# Patient Record
Sex: FEMALE | Race: WHITE | NOT HISPANIC OR LATINO | Employment: OTHER | URBAN - NONMETROPOLITAN AREA
[De-identification: names, ages, dates, MRNs, and addresses within clinical notes are randomized per-mention and may not be internally consistent; named-entity substitution may affect disease eponyms.]

---

## 2017-01-04 ENCOUNTER — OFFICE VISIT (OUTPATIENT)
Dept: SURGERY | Facility: CLINIC | Age: 57
End: 2017-01-04

## 2017-01-04 ENCOUNTER — OFFICE VISIT (OUTPATIENT)
Dept: FAMILY MEDICINE CLINIC | Facility: CLINIC | Age: 57
End: 2017-01-04

## 2017-01-04 ENCOUNTER — OFFICE VISIT (OUTPATIENT)
Dept: ENDOCRINOLOGY | Facility: CLINIC | Age: 57
End: 2017-01-04

## 2017-01-04 VITALS
HEART RATE: 88 BPM | WEIGHT: 282.2 LBS | BODY MASS INDEX: 51.93 KG/M2 | SYSTOLIC BLOOD PRESSURE: 136 MMHG | DIASTOLIC BLOOD PRESSURE: 78 MMHG | HEIGHT: 62 IN

## 2017-01-04 VITALS
WEIGHT: 280.1 LBS | HEIGHT: 62 IN | OXYGEN SATURATION: 96 % | SYSTOLIC BLOOD PRESSURE: 138 MMHG | HEART RATE: 72 BPM | DIASTOLIC BLOOD PRESSURE: 82 MMHG | BODY MASS INDEX: 51.54 KG/M2

## 2017-01-04 VITALS
SYSTOLIC BLOOD PRESSURE: 128 MMHG | BODY MASS INDEX: 52.08 KG/M2 | HEIGHT: 62 IN | WEIGHT: 283 LBS | DIASTOLIC BLOOD PRESSURE: 68 MMHG

## 2017-01-04 DIAGNOSIS — E11.49 NEUROLOGIC DISORDER ASSOCIATED WITH DIABETES MELLITUS (HCC): ICD-10-CM

## 2017-01-04 DIAGNOSIS — E11.69 DIABETES MELLITUS TYPE 2 IN OBESE (HCC): ICD-10-CM

## 2017-01-04 DIAGNOSIS — E55.9 VITAMIN D DEFICIENCY: ICD-10-CM

## 2017-01-04 DIAGNOSIS — E66.9 DIABETES MELLITUS TYPE 2 IN OBESE (HCC): Primary | ICD-10-CM

## 2017-01-04 DIAGNOSIS — E78.5 DYSLIPIDEMIA: ICD-10-CM

## 2017-01-04 DIAGNOSIS — E66.9 DIABETES MELLITUS TYPE 2 IN OBESE (HCC): ICD-10-CM

## 2017-01-04 DIAGNOSIS — I10 ESSENTIAL HYPERTENSION: ICD-10-CM

## 2017-01-04 DIAGNOSIS — E11.69 DIABETES MELLITUS TYPE 2 IN OBESE (HCC): Primary | ICD-10-CM

## 2017-01-04 DIAGNOSIS — L02.211 ABDOMINAL WALL ABSCESS: Primary | ICD-10-CM

## 2017-01-04 PROCEDURE — 99213 OFFICE O/P EST LOW 20 MIN: CPT | Performed by: FAMILY MEDICINE

## 2017-01-04 PROCEDURE — 99024 POSTOP FOLLOW-UP VISIT: CPT | Performed by: SURGERY

## 2017-01-04 PROCEDURE — 99214 OFFICE O/P EST MOD 30 MIN: CPT | Performed by: NURSE PRACTITIONER

## 2017-01-04 RX ORDER — OMEPRAZOLE AND SODIUM BICARBONATE 40; 1100 MG/1; MG/1
CAPSULE ORAL
Refills: 3 | Status: ON HOLD | COMMUNITY
Start: 2016-12-24 | End: 2017-03-21 | Stop reason: SDUPTHER

## 2017-01-04 RX ORDER — CHLORHEXIDINE GLUCONATE 4 G/100ML
SOLUTION TOPICAL DAILY
Qty: 118 ML | Refills: 0 | Status: SHIPPED | OUTPATIENT
Start: 2017-01-04 | End: 2017-03-21 | Stop reason: HOSPADM

## 2017-01-04 RX ORDER — FLUCONAZOLE 150 MG/1
TABLET ORAL
COMMUNITY
Start: 2016-12-14 | End: 2017-01-27

## 2017-01-04 NOTE — LETTER
January 4, 2017     Yadira Delarosa MD  [facility] [address] [place] [address]    Patient: Yamileth Slater   YOB: 1960   Date of Visit: 1/4/2017       Dear Dr. Isaura MD:    Thank you for referring Yamileth Slater to me for evaluation. Below are the relevant portions of my assessment and plan of care.                   If you have questions, please do not hesitate to call me. I look forward to following Yamileth along with you.         Sincerely,        Lucien Dow MD        CC: No Recipients

## 2017-01-04 NOTE — PROGRESS NOTES
Subjective    Yamileth Slater is a 56 y.o. female. she is here today for follow-up.    History of Present Illness     History of Present Illness  History of Present Illness  Duration/Timing:  Diabetes mellitus type 2, Age at onset of diabetes: 33 years, Onset of symptoms gradual  Timing - constant  Quality - uncontrolled  Severity - moderate    Severity (Complications/Hospitalizations)  Secondary Macrovascular Complications:  CAD, PAD  PTCA stent in May 2014    left carotid stenst    left fem pop    CABG x 3, 2014    Context  Diabetes Regimen:  Insulin  Blood Glucose Readings  [id number]  Diet  trying to eat 60 g cho per meal   Exercise:  Does not exercise    Associated Signs/Symptoms  Hyperglycemic Symptoms:  No polyuria, No polydipsia, No polyphagia  Hypoglycemic Episodes:  No documented hypoglycemia  Modifying Factors/Comorbidities  Alleviating - family support, compliance      The following portions of the patient's history were reviewed and updated as appropriate:   Past Medical History   Diagnosis Date   • Anxiety states    • Asthma    • Candidiasis of mouth    • Candidiasis of vulva and vagina    • Carotid artery stenosis      DWIGHT 0-15%, LICA 0-15%. LICA stent 5/2014.   • Chest pain    • Chronic folliculitis    • Chronic obstructive lung disease    • Coronary arteriosclerosis    • Diabetes mellitus      no retinopathy; A1C 9.1      • Dyslipidemia    • Dysphagia    • Encounter for general adult medical examination with abnormal findings    • Essential hypertension    • Excoriated eczema      asteosis/keratosis   • Furuncle of neck    • GERD (gastroesophageal reflux disease)    • History of colon polyps    • Hypertensive disorder    • Infection of skin and subcutaneous tissue      rt perineal abscess   • Infestation by Sarcoptes scabiei deepak hominis    • Kidney stone    • Leukocytosis    • Lower limb anomaly      Abscess of lower limb - ANTX causing n/v      • Mixed hyperlipidemia    • Morbid obesity due to  excess calories      BMI 44.5   • Need for vaccination    • Neurologic disorder associated with diabetes mellitus    • Non-healing surgical wound      LLE EVHa   • Pain      LLE   • Peripheral vascular disease    • Rash    • Shoulder joint pain    • Sleep apnea    • Surgical follow-up care      5/27/14 L carotid stent   • Tobacco dependence syndrome      1/2ppd      • Type 2 diabetes mellitus    • Viral wart      RIGHT middle phalanx   • Vitamin D deficiency      Past Surgical History   Procedure Laterality Date   • Coronary artery bypass graft  11/15/2013     CABG x 3 with LIMA to LAD and coronary endarterectomy to LAD, SVG to Ramus intermedius branch and SVG to PDA.    • Cardiac catheterization  08/09/2013     Severe multivessel CAD with critical lesions noted in the RCA, obtuse marginal two, ramus intermemdious, and LAD as described above. Normal LV systolic function with no wall motion abnormality.    • Cardiac catheterization  05/27/2014      LEFT Carotid Stent    • Colonoscopy  05/02/2016     Normal colon.Diverticulosis in the sigmoid colon.No specimens collected.    • Other surgical history  01/25/2016     DESTRUCTION OF BENIGN LESION      • Endoscopy  09/23/2015      Esophageal stricture present.Normal stomach.Normal duodenum.    • Endoscopy  11/16/2015     w/ dilatation - Esophageal stricture present,Dilatation performed.Normal stomach and duodenum.    • Other surgical history  04/18/2014     ear/neck - L attempted CEA, Neck Dissection    • Incision and drainage abscess  01/02/2016     Incision and drainage of right perineal abscess.    • Incision and drainage abscess  02/18/2014     Incision and Drainage of abscess of left lower leg      Family History   Problem Relation Age of Onset   • Coronary artery disease Other    • Diabetes Other    • Heart disease Other    • Hypertension Other      OB History     No data available        Current Outpatient Prescriptions   Medication Sig Dispense Refill   •  acetaminophen (TYLENOL) 325 MG tablet Take 650 mg by mouth every 4 (four) hours as needed for mild pain (1-3).     • atorvastatin (LIPITOR) 20 MG tablet Take 1 tablet by mouth Daily. 30 tablet 3   • Blood Glucose Monitoring Suppl ([employer] BLOOD GLUCOSE METER) W/DEVICE kit 1 each 3 (Three) Times a Day. 1 kit 3   • budesonide-formoterol (SYMBICORT) 160-4.5 MCG/ACT inhaler Inhale 2 puffs 2 (Two) Times a Day. 1 inhaler 6   • cetirizine (zyrTEC) 10 MG tablet Take 1 tablet by mouth Daily. 30 tablet 3   • chlorhexidine (HIBICLENS) 4 % external liquid Apply  topically Daily. 118 mL 0   • clindamycin (CLEOCIN) 150 MG capsule Take 1 capsule by mouth 4 (Four) Times a Day.  0   • clobetasol (CLOBEX) 0.05 % lotion Apply  topically. apply to skin bid x 1-2w then prn. generic     • clopidogrel (PLAVIX) 75 MG tablet Take 1 tablet by mouth Daily. 30 tablet 3   • cyclobenzaprine (FLEXERIL) 10 MG tablet TAKE 1 TABLET BY MOUTH 2 (TWO) TIMES A DAY AS NEEDED FOR MUSCLE SPASMS. 60 tablet 0   • fluconazole (DIFLUCAN) 100 MG tablet Take 1 tablet by mouth Daily. 14 tablet 0   • fluconazole (DIFLUCAN) 150 MG tablet      • furosemide (LASIX) 40 MG tablet TAKE 1 TABLET BY MOUTH DAILY. 30 tablet 0   • gabapentin (NEURONTIN) 800 MG tablet TAKE 1 TABLET BY MOUTH 3 (THREE) TIMES A DAY. 90 tablet 0   • glucose blood test strip 1 each by Other route 4 (Four) Times a Day. Use as instructed 100 each 12   • HYDROcodone-acetaminophen (NORCO) 5-325 MG per tablet Take 1 tablet by mouth Every 6 (Six) Hours As Needed for moderate pain (4-6). 20 tablet 0   • Insulin Glargine (LANTUS SOLOSTAR) 100 UNIT/ML injection pen Inject 160 Units under the skin Every Night. 75 mL 4   • insulin glargine (LANTUS) 100 UNIT/ML injection Inject 90 Units under the skin Every 12 (Twelve) Hours.     • Insulin Lispro (HUMALOG KWIKPEN) 100 UNIT/ML solution pen-injector Inject 45 Units under the skin 3 (Three) Times a Day Before Meals. 60 mL 4   • Insulin Pen Needle (NOVOFINE) 30G X 8  MM misc 4 (four) times a day.     • lisinopril (PRINIVIL,ZESTRIL) 10 MG tablet Take 1 tablet by mouth Daily. 30 tablet 3   • metFORMIN (GLUCOPHAGE) 1000 MG tablet Take 1 tablet by mouth 2 (Two) Times a Day With Meals. 60 tablet 3   • metoprolol tartrate (LOPRESSOR) 50 MG tablet Take 1 tablet by mouth 2 (Two) Times a Day. 60 tablet 3   • naproxen (NAPROSYN) 500 MG tablet Take 1 tablet by mouth 2 (Two) Times a Day With Meals. 60 tablet 1   • nitroglycerin (NITROSTAT) 0.4 MG SL tablet Place 1 tablet under the tongue As Needed for chest pain. Take no more than 3 doses in 15 minutes. 30 tablet 3   • omeprazole-sodium bicarbonate (ZEGERID)  MG per capsule   3   • pantoprazole (PROTONIX) 40 MG EC tablet Take 1 tablet by mouth Daily. 30 tablet 3   • permethrin (ELIMITE) 5 % cream Apply  topically. Massage into skin from head to feet, remove 8-14 hours later with water     • potassium chloride (K-DUR) 10 MEQ CR tablet Take 1 tablet by mouth Every Night. 30 tablet 3   • promethazine (PHENERGAN) 25 MG tablet Take 25 mg by mouth every 6 (six) hours as needed for nausea or vomiting.       No current facility-administered medications for this visit.      Allergies   Allergen Reactions   • Other      Bandaids, MRSA, SURECLOSE     Social History     Social History   • Marital status:      Spouse name: N/A   • Number of children: N/A   • Years of education: N/A     Social History Main Topics   • Smoking status: Current Every Day Smoker     Types: Cigarettes   • Smokeless tobacco: None   • Alcohol use No   • Drug use: No   • Sexual activity: Not Asked     Other Topics Concern   • None     Social History Narrative       Review of Systems  Review of Systems   Constitutional: Negative for activity change, appetite change, diaphoresis and fatigue.   HENT: Negative for congestion, dental problem, drooling, ear discharge, ear pain, facial swelling, sneezing, sore throat, tinnitus, trouble swallowing and voice change.    Eyes:  "Negative for photophobia, pain, discharge, redness, itching and visual disturbance.   Respiratory: Negative for apnea, cough, choking, chest tightness and shortness of breath.    Cardiovascular: Negative for chest pain, palpitations and leg swelling.   Gastrointestinal: Negative for abdominal distention, abdominal pain, constipation, diarrhea, nausea and vomiting.   Endocrine: Negative for cold intolerance, heat intolerance, polydipsia, polyphagia and polyuria.   Genitourinary: Negative for difficulty urinating, dysuria, frequency, hematuria and urgency.   Musculoskeletal: Negative for arthralgias, back pain, gait problem, joint swelling, myalgias, neck pain and neck stiffness.   Skin: Negative for color change, pallor, rash and wound.   Allergic/Immunologic: Negative for environmental allergies, food allergies and immunocompromised state.   Neurological: Negative for dizziness, tremors, facial asymmetry, weakness, light-headedness, numbness and headaches.   Hematological: Negative for adenopathy. Does not bruise/bleed easily.   Psychiatric/Behavioral: Negative for agitation, behavioral problems, confusion, decreased concentration, dysphoric mood and sleep disturbance.        Objective      Visit Vitals   • /78 (BP Location: Right arm, Patient Position: Sitting, Cuff Size: Adult)   • Pulse 88   • Ht 62\" (157.5 cm)   • Wt 282 lb 3.2 oz (128 kg)   • BMI 51.62 kg/m2     Physical Exam   Constitutional: She is oriented to person, place, and time. She appears well-developed and well-nourished. No distress.   HENT:   Head: Normocephalic and atraumatic.   Right Ear: External ear normal.   Left Ear: External ear normal.   Nose: Nose normal.   Eyes: Conjunctivae and EOM are normal. Pupils are equal, round, and reactive to light.   Neck: Normal range of motion. Neck supple. No tracheal deviation present. No thyromegaly present.   Cardiovascular: Normal rate, regular rhythm and normal heart sounds.    No murmur " heard.  Pulmonary/Chest: Effort normal and breath sounds normal. No respiratory distress. She has no wheezes.   Abdominal: Soft. Bowel sounds are normal. There is no tenderness. There is no rebound and no guarding.   Musculoskeletal: Normal range of motion. She exhibits no edema, tenderness or deformity.   Neurological: She is alert and oriented to person, place, and time. No cranial nerve deficit.   Skin: Skin is warm and dry. No rash noted.   Psychiatric: She has a normal mood and affect. Her behavior is normal. Judgment and thought content normal.       Lab Review  GLUCOSE (mg/dl)   Date Value   12/14/2016 101 (H)   12/13/2016 210 (H)   12/12/2016 415 (H)     SODIUM (mmol/L)   Date Value   12/14/2016 141   12/13/2016 140   12/12/2016 136 (L)     POTASSIUM (mmol/L)   Date Value   12/14/2016 4.1   12/13/2016 4.3   12/12/2016 4.1     CHLORIDE (mmol/L)   Date Value   12/14/2016 102   12/13/2016 102   12/12/2016 97     CO2 (mmol/L)   Date Value   12/14/2016 28   12/13/2016 25   12/12/2016 24     BUN (mg/dl)   Date Value   12/14/2016 16   12/13/2016 20   12/12/2016 36 (H)     CREATININE (mg/dl)   Date Value   12/14/2016 0.9   12/13/2016 0.9   12/12/2016 1.3 (H)     HEMOGLOBIN A1C (%TotHgb)   Date Value   12/12/2016 11.8 (H)   11/17/2016 10.9 (H)   08/09/2016 9.1 (H)     TRIGLYCERIDES (mg/dl)   Date Value   11/09/2016 >525 (H)   08/10/2016 310 (H)   03/27/2015 295 (H)       Assessment/Plan      1. Diabetes mellitus type 2 in obese    2. Vitamin D deficiency    3. Neurologic disorder associated with diabetes mellitus    4. Dyslipidemia    5. Essential hypertension    .    Medications prescribed:  Outpatient Encounter Prescriptions as of 1/4/2017   Medication Sig Dispense Refill   • acetaminophen (TYLENOL) 325 MG tablet Take 650 mg by mouth every 4 (four) hours as needed for mild pain (1-3).     • atorvastatin (LIPITOR) 20 MG tablet Take 1 tablet by mouth Daily. 30 tablet 3   • Blood Glucose Monitoring Suppl ([employer] BLOOD  GLUCOSE METER) W/DEVICE kit 1 each 3 (Three) Times a Day. 1 kit 3   • budesonide-formoterol (SYMBICORT) 160-4.5 MCG/ACT inhaler Inhale 2 puffs 2 (Two) Times a Day. 1 inhaler 6   • cetirizine (zyrTEC) 10 MG tablet Take 1 tablet by mouth Daily. 30 tablet 3   • chlorhexidine (HIBICLENS) 4 % external liquid Apply  topically Daily. 118 mL 0   • clindamycin (CLEOCIN) 150 MG capsule Take 1 capsule by mouth 4 (Four) Times a Day.  0   • clobetasol (CLOBEX) 0.05 % lotion Apply  topically. apply to skin bid x 1-2w then prn. generic     • clopidogrel (PLAVIX) 75 MG tablet Take 1 tablet by mouth Daily. 30 tablet 3   • cyclobenzaprine (FLEXERIL) 10 MG tablet TAKE 1 TABLET BY MOUTH 2 (TWO) TIMES A DAY AS NEEDED FOR MUSCLE SPASMS. 60 tablet 0   • fluconazole (DIFLUCAN) 100 MG tablet Take 1 tablet by mouth Daily. 14 tablet 0   • fluconazole (DIFLUCAN) 150 MG tablet      • furosemide (LASIX) 40 MG tablet TAKE 1 TABLET BY MOUTH DAILY. 30 tablet 0   • gabapentin (NEURONTIN) 800 MG tablet TAKE 1 TABLET BY MOUTH 3 (THREE) TIMES A DAY. 90 tablet 0   • glucose blood test strip 1 each by Other route 4 (Four) Times a Day. Use as instructed 100 each 12   • HYDROcodone-acetaminophen (NORCO) 5-325 MG per tablet Take 1 tablet by mouth Every 6 (Six) Hours As Needed for moderate pain (4-6). 20 tablet 0   • Insulin Glargine (LANTUS SOLOSTAR) 100 UNIT/ML injection pen Inject 160 Units under the skin Every Night. 75 mL 4   • insulin glargine (LANTUS) 100 UNIT/ML injection Inject 90 Units under the skin Every 12 (Twelve) Hours.     • Insulin Lispro (HUMALOG KWIKPEN) 100 UNIT/ML solution pen-injector Inject 45 Units under the skin 3 (Three) Times a Day Before Meals. 60 mL 4   • Insulin Pen Needle (NOVOFINE) 30G X 8 MM misc 4 (four) times a day.     • lisinopril (PRINIVIL,ZESTRIL) 10 MG tablet Take 1 tablet by mouth Daily. 30 tablet 3   • metFORMIN (GLUCOPHAGE) 1000 MG tablet Take 1 tablet by mouth 2 (Two) Times a Day With Meals. 60 tablet 3   •  metoprolol tartrate (LOPRESSOR) 50 MG tablet Take 1 tablet by mouth 2 (Two) Times a Day. 60 tablet 3   • naproxen (NAPROSYN) 500 MG tablet Take 1 tablet by mouth 2 (Two) Times a Day With Meals. 60 tablet 1   • nitroglycerin (NITROSTAT) 0.4 MG SL tablet Place 1 tablet under the tongue As Needed for chest pain. Take no more than 3 doses in 15 minutes. 30 tablet 3   • omeprazole-sodium bicarbonate (ZEGERID)  MG per capsule   3   • pantoprazole (PROTONIX) 40 MG EC tablet Take 1 tablet by mouth Daily. 30 tablet 3   • permethrin (ELIMITE) 5 % cream Apply  topically. Massage into skin from head to feet, remove 8-14 hours later with water     • potassium chloride (K-DUR) 10 MEQ CR tablet Take 1 tablet by mouth Every Night. 30 tablet 3   • promethazine (PHENERGAN) 25 MG tablet Take 25 mg by mouth every 6 (six) hours as needed for nausea or vomiting.       No facility-administered encounter medications on file as of 1/4/2017.        Orders placed during this encounter include:  No orders of the defined types were placed in this encounter.      Glycemic Management  Lantus 100 bid --- taking 90 units pm and 90 units in am if blood sugar is high --decrease to 85 units     Novolog 45 w meals     change to 30 for small meals    then 60 to 75 for bigger meals   Lipid Management  on lipitor 20 mg qhs    ldl 60s   Blood Pressure Management  controlled on lisinopril   Microvascular Complication Monitoring:  Neuropathy type sensorial  needs new eye exam       Weight Related:  Obesity, Recommended weight loss, Counseled on nutrition  Other Diabetes Related Aspects  nl TSH in 3-15        4. Follow-up: Return in about 4 weeks (around 2/1/2017) for Recheck.    .

## 2017-01-04 NOTE — PROGRESS NOTES
"  Subjective:     Yamileth Slater is a 56 y.o. female who presents for follow up for recurrent abdominal wall/pannicular cellulitis with abscess and uncontrolled DM2 with hyperglycemia.  Pt was discharged on 12/14/16 with home health nursing for dressing changes/wound care. Pt missed initial follow up appt with Dr. Dow but is scheduled to see him this afternoon.  She has follow up appointment Laz Patricia (1/4/17) for DM2 medication management.  Pt reports incision site healing and requiring less packing.  Pt is taking care to keep the area clean & dry.      Diabetes HPI:  The patient was initially diagnosed with Type 2 diabetes mellitus based on the following criteria:  Elevated A1C and random blood glucose.    Known diabetic complications: peripheral neuropathy and cardiovascular disease  Cardiovascular risk factors: diabetes mellitus, dyslipidemia, hypertension, obesity (BMI >= 30 kg/m2), sedentary lifestyle and smoking/ tobacco exposure  Current diabetic medications include Metformin 1000 mg BID; Lantus 90u qhs & 90u qAM; Humalog 45-60u sliding scale with meals.     Eye exam current (within one year): no   Weight trend: increasing steadily  Prior visit with dietician: yes - in hospital  Current diet: in general, an \"unhealthy\" diet, but has been avoiding [email or web address]h as recommended  Current exercise: none    Current monitoring regimen: home blood tests - daily  Home blood sugar records: checks irregularly, sometimes fasting, sometimes during the day.  Values: less than 120  Any episodes of hypoglycemia? no     ACE inhibitor or angiotensin II receptor blocker?     Yes     lisinopril (generic)        Statin?     Yes    ASA?     Yes     HEMOGLOBIN A1C (%TotHgb)   Date Value   12/12/2016 11.8 (H)   11/17/2016 10.9 (H)   08/09/2016 9.1 (H)       ASSOCIATED SYMPTOMS:     FatigueNo   MalaiseNo    DiaphoresisNo    Weight lossNo    Weight gainYes    Visual impairmentNo   Chest painNo    PalpitationsNo "    TachycardiaNo   ClaudicationNo    PolyphagiaYes    PolydipsiaYes    PolyuriaYes    NumbnessYes    Foot painNo    Skin lesionsNo    Memory lossNo     COMORBID CONDITIONS:     ObesityYes    HypothyroidismNo    HyperlipidemiaYes    CADYes    Cerebrovascular diseaseNo   PVDNo    HTNYes    Sexual dysfunctionNo    DepressionNo      The following portions of the patient's history were reviewed and updated as appropriate: allergies, current medications, past family history, past medical history, past social history, past surgical history and problem list.    Preventative:  Over the past 2 weeks, have you felt down, depressed, or hopeless?No   Over the past 2 weeks, have you felt little interest or pleasure in doing things?No  Clinical depression screening refused by patient.No     On osteoporosis therapy?No     Past Medical Hx:  Past Medical History   Diagnosis Date   • Anxiety states    • Asthma    • Candidiasis of mouth    • Candidiasis of vulva and vagina    • Carotid artery stenosis      DWIGHT 0-15%, LICA 0-15%. LICA stent 5/2014.   • Chest pain    • Chronic folliculitis    • Chronic obstructive lung disease    • Coronary arteriosclerosis    • Diabetes mellitus      no retinopathy; A1C 9.1      • Dyslipidemia    • Dysphagia    • Encounter for general adult medical examination with abnormal findings    • Essential hypertension    • Excoriated eczema      asteosis/keratosis   • Furuncle of neck    • GERD (gastroesophageal reflux disease)    • History of colon polyps    • Hypertensive disorder    • Infection of skin and subcutaneous tissue      rt perineal abscess   • Infestation by Sarcoptes scabiei deepak hominis    • Kidney stone    • Leukocytosis    • Lower limb anomaly      Abscess of lower limb - ANTX causing n/v      • Mixed hyperlipidemia    • Morbid obesity due to excess calories      BMI 44.5   • Need for vaccination    • Neurologic disorder associated with diabetes mellitus    • Non-healing surgical wound       LLE EVHa   • Pain      LLE   • Peripheral vascular disease    • Rash    • Shoulder joint pain    • Sleep apnea    • Surgical follow-up care      5/27/14 L carotid stent   • Tobacco dependence syndrome      1/2ppd      • Type 2 diabetes mellitus    • Viral wart      RIGHT middle phalanx   • Vitamin D deficiency        Past Surgical Hx:  Past Surgical History   Procedure Laterality Date   • Coronary artery bypass graft  11/15/2013     CABG x 3 with LIMA to LAD and coronary endarterectomy to LAD, SVG to Ramus intermedius branch and SVG to PDA.    • Cardiac catheterization  08/09/2013     Severe multivessel CAD with critical lesions noted in the RCA, obtuse marginal two, ramus intermemdious, and LAD as described above. Normal LV systolic function with no wall motion abnormality.    • Cardiac catheterization  05/27/2014      LEFT Carotid Stent    • Colonoscopy  05/02/2016     Normal colon.Diverticulosis in the sigmoid colon.No specimens collected.    • Other surgical history  01/25/2016     DESTRUCTION OF BENIGN LESION      • Endoscopy  09/23/2015      Esophageal stricture present.Normal stomach.Normal duodenum.    • Endoscopy  11/16/2015     w/ dilatation - Esophageal stricture present,Dilatation performed.Normal stomach and duodenum.    • Other surgical history  04/18/2014     ear/neck - L attempted CEA, Neck Dissection    • Incision and drainage abscess  01/02/2016     Incision and drainage of right perineal abscess.    • Incision and drainage abscess  02/18/2014     Incision and Drainage of abscess of left lower leg        Health Maintenance:  Immunization History   Administered Date(s) Administered   • Hepatitis B 06/11/2015, 10/20/2015, 05/13/2016   • Influenza Split High Dose Preservative Free IM 11/10/2014   • Pneumococcal Polysaccharide 01/01/2012   • Tdap 11/10/2014     Health Maintenance   Topic Date Due   • MAMMOGRAM  08/24/2016   • PAP SMEAR  08/24/2016   • DIABETIC EYE EXAM  08/24/2016   • MEDICARE ANNUAL  WELLNESS  08/24/2016   • HEMOGLOBIN A1C  06/12/2017   • URINE MICROALBUMIN  10/03/2017   • DIABETIC FOOT EXAM  11/09/2017   • LIPID PANEL  11/09/2017   • TDAP/TD VACCINES (2 - Td) 11/10/2024   • COLONOSCOPY  05/02/2026   • PNEUMOCOCCAL VACCINE (19-64 MEDIUM RISK)  Completed   • HEPATITIS C SCREENING  Completed   • INFLUENZA VACCINE  Addressed       Current Meds:    Current Outpatient Prescriptions:   •  atorvastatin (LIPITOR) 20 MG tablet, Take 1 tablet by mouth Daily., Disp: 30 tablet, Rfl: 3  •  Blood Glucose Monitoring Suppl ([employer] BLOOD GLUCOSE METER) W/DEVICE kit, 1 each 3 (Three) Times a Day., Disp: 1 kit, Rfl: 3  •  budesonide-formoterol (SYMBICORT) 160-4.5 MCG/ACT inhaler, Inhale 2 puffs 2 (Two) Times a Day., Disp: 1 inhaler, Rfl: 6  •  cetirizine (zyrTEC) 10 MG tablet, Take 1 tablet by mouth Daily., Disp: 30 tablet, Rfl: 3  •  clobetasol (CLOBEX) 0.05 % lotion, Apply  topically. apply to skin bid x 1-2w then prn. generic, Disp: , Rfl:   •  clopidogrel (PLAVIX) 75 MG tablet, Take 1 tablet by mouth Daily., Disp: 30 tablet, Rfl: 3  •  cyclobenzaprine (FLEXERIL) 10 MG tablet, TAKE 1 TABLET BY MOUTH 2 (TWO) TIMES A DAY AS NEEDED FOR MUSCLE SPASMS., Disp: 60 tablet, Rfl: 0  •  fluconazole (DIFLUCAN) 150 MG tablet, , Disp: , Rfl:   •  furosemide (LASIX) 40 MG tablet, TAKE 1 TABLET BY MOUTH DAILY., Disp: 30 tablet, Rfl: 0  •  gabapentin (NEURONTIN) 800 MG tablet, TAKE 1 TABLET BY MOUTH 3 (THREE) TIMES A DAY., Disp: 90 tablet, Rfl: 0  •  glucose blood test strip, 1 each by Other route 4 (Four) Times a Day. Use as instructed, Disp: 100 each, Rfl: 12  •  Insulin Glargine (LANTUS SOLOSTAR) 100 UNIT/ML injection pen, Inject 160 Units under the skin Every Night., Disp: 75 mL, Rfl: 4  •  insulin glargine (LANTUS) 100 UNIT/ML injection, Inject 90 Units under the skin Every 12 (Twelve) Hours., Disp: , Rfl:   •  Insulin Lispro (HUMALOG KWIKPEN) 100 UNIT/ML solution pen-injector, Inject 45 Units under the skin 3 (Three) Times a  Day Before Meals., Disp: 60 mL, Rfl: 4  •  Insulin Pen Needle (NOVOFINE) 30G X 8 MM misc, 4 (four) times a day., Disp: , Rfl:   •  lisinopril (PRINIVIL,ZESTRIL) 10 MG tablet, Take 1 tablet by mouth Daily., Disp: 30 tablet, Rfl: 3  •  metFORMIN (GLUCOPHAGE) 1000 MG tablet, Take 1 tablet by mouth 2 (Two) Times a Day With Meals., Disp: 60 tablet, Rfl: 3  •  metoprolol tartrate (LOPRESSOR) 50 MG tablet, Take 1 tablet by mouth 2 (Two) Times a Day., Disp: 60 tablet, Rfl: 3  •  naproxen (NAPROSYN) 500 MG tablet, Take 1 tablet by mouth 2 (Two) Times a Day With Meals., Disp: 60 tablet, Rfl: 1  •  nitroglycerin (NITROSTAT) 0.4 MG SL tablet, Place 1 tablet under the tongue As Needed for chest pain. Take no more than 3 doses in 15 minutes., Disp: 30 tablet, Rfl: 3  •  omeprazole-sodium bicarbonate (ZEGERID)  MG per capsule, , Disp: , Rfl: 3  •  pantoprazole (PROTONIX) 40 MG EC tablet, Take 1 tablet by mouth Daily., Disp: 30 tablet, Rfl: 3  •  permethrin (ELIMITE) 5 % cream, Apply  topically. Massage into skin from head to feet, remove 8-14 hours later with water, Disp: , Rfl:   •  potassium chloride (K-DUR) 10 MEQ CR tablet, Take 1 tablet by mouth Every Night., Disp: 30 tablet, Rfl: 3  •  promethazine (PHENERGAN) 25 MG tablet, Take 25 mg by mouth every 6 (six) hours as needed for nausea or vomiting., Disp: , Rfl:   •  acetaminophen (TYLENOL) 325 MG tablet, Take 650 mg by mouth every 4 (four) hours as needed for mild pain (1-3)., Disp: , Rfl:   •  clindamycin (CLEOCIN) 150 MG capsule, Take 1 capsule by mouth 4 (Four) Times a Day., Disp: , Rfl: 0  •  fluconazole (DIFLUCAN) 100 MG tablet, Take 1 tablet by mouth Daily., Disp: 14 tablet, Rfl: 0  •  HYDROcodone-acetaminophen (NORCO) 5-325 MG per tablet, Take 1 tablet by mouth Every 6 (Six) Hours As Needed for moderate pain (4-6)., Disp: 20 tablet, Rfl: 0    Allergies:  Other    Family Hx:  Family History   Problem Relation Age of Onset   • Coronary artery disease Other    •  "Diabetes Other    • Heart disease Other    • Hypertension Other         Social History:  Social History     Social History   • Marital status:      Spouse name: N/A   • Number of children: N/A   • Years of education: N/A     Occupational History   • Not on file.     Social History Main Topics   • Smoking status: Current Every Day Smoker     Types: Cigarettes   • Smokeless tobacco: Not on file   • Alcohol use No   • Drug use: No   • Sexual activity: Not on file     Other Topics Concern   • Not on file     Social History Narrative       Review of Systems  Review of Systems   Constitutional: Negative for activity change, appetite change, chills, diaphoresis, fatigue and unexpected weight change.   HENT: Negative for congestion, facial swelling, postnasal drip, rhinorrhea, sinus pressure, sneezing and sore throat.    Eyes: Negative for photophobia and visual disturbance.   Respiratory: Negative for cough, chest tightness, shortness of breath and wheezing.    Cardiovascular: Negative for chest pain, palpitations and leg swelling.   Gastrointestinal: Negative for abdominal pain, constipation, diarrhea, nausea and vomiting.   Endocrine: Positive for polydipsia, polyphagia and polyuria.   Genitourinary: Negative for difficulty urinating, dysuria and urgency.   Musculoskeletal: Negative for arthralgias, gait problem, joint swelling and neck stiffness.   Skin: Positive for wound (RLQ (pannicular) incision). Negative for color change, pallor and rash.   Neurological: Positive for numbness. Negative for seizures, syncope, speech difficulty, weakness, light-headedness and headaches.   Psychiatric/Behavioral: Negative for decreased concentration, dysphoric mood, sleep disturbance and suicidal ideas. The patient is not nervous/anxious.        Objective:     Visit Vitals   • /82 (BP Location: Left arm, Patient Position: Sitting)   • Pulse 72   • Ht 62\" (157.5 cm)   • Wt 280 lb 1.6 oz (127 kg)   • SpO2 96%   • BMI 51.23 " kg/m2       Physical Exam   Constitutional: No distress.   Obese   HENT:   Head: Normocephalic and atraumatic.   Nose: Nose normal.   Mouth/Throat: Oropharynx is clear and moist. No oropharyngeal exudate.   Eyes: Conjunctivae and EOM are normal. Pupils are equal, round, and reactive to light. No scleral icterus.   Neck: Normal range of motion. Neck supple. No tracheal deviation present.   Cardiovascular: Normal rate, regular rhythm, normal heart sounds and intact distal pulses.    Pulmonary/Chest: Effort normal and breath sounds normal. No respiratory distress.   Abdominal: Soft. Bowel sounds are normal. She exhibits no distension. There is no tenderness.       Musculoskeletal: Normal range of motion.   Neurological: She is alert. Gait normal.   Skin: Skin is warm and dry. She is not diaphoretic.   Psychiatric: Mood, memory, affect and judgment normal.     Diabetic foot exam:   Left: Filament test present              Grant test not examined   Pulses wnl   Reflexes present   Vibratory sensation diminished   Proprioception normal   Sharp/dull discrimination diminished       Right: Filament test present              Grant test not examined   Pulses present   Reflexes wnl   Vibratory sensation diminished   Proprioception normal   Sharp/dull discrimination diminished     Feet - warm, good capillary refill       Lab Review  GLUCOSE (mg/dl)   Date Value   12/14/2016 101 (H)   12/13/2016 210 (H)   12/12/2016 415 (H)     SODIUM (mmol/L)   Date Value   12/14/2016 141   12/13/2016 140   12/12/2016 136 (L)     POTASSIUM (mmol/L)   Date Value   12/14/2016 4.1   12/13/2016 4.3   12/12/2016 4.1     CHLORIDE (mmol/L)   Date Value   12/14/2016 102   12/13/2016 102   12/12/2016 97     CO2 (mmol/L)   Date Value   12/14/2016 28   12/13/2016 25   12/12/2016 24     BUN (mg/dl)   Date Value   12/14/2016 16   12/13/2016 20   12/12/2016 36 (H)     CREATININE (mg/dl)   Date Value   12/14/2016 0.9   12/13/2016 0.9   12/12/2016 1.3 (H)      HEMOGLOBIN A1C (%TotHgb)   Date Value   12/12/2016 11.8 (H)   11/17/2016 10.9 (H)   08/09/2016 9.1 (H)     TRIGLYCERIDES (mg/dl)   Date Value   11/09/2016 >525 (H)   08/10/2016 310 (H)   03/27/2015 295 (H)       none     Assessment:     Yamileth was seen today for diabetes and wound check.    Diagnoses and all orders for this visit:    Abdominal wall abscess    Diabetes mellitus type 2 in obese    Other orders  -     chlorhexidine (HIBICLENS) 4 % external liquid; Apply  topically Daily.        Plan:     1.  Rx changes: none  2.  Education: Reviewed ‘ABCs’ of diabetes management (respective goals in parentheses):  A1C (<7), preprandial glucose (70 mg/dl - 130 mg/dl), postprandial glucose (< 180 mg/dl), blood pressure (<130/80), and cholesterol (LDL <100 mg/dl; HDL > 50 mg/dl; Trig < 150 mg/dl).  3.  Issues reviewed with Yamileth Slater: low cholesterol diet, weight control and daily exercise discussed, home glucose monitoring emphasized, foot care discussed and Podiatry visits discussed, annual eye examinations at Ophthalmology discussed, long term diabetic complications discussed, patient urged in the strongest terms to quit smoking and follow ADA diet.  4.  Compliance at present is estimated to be fair. Efforts to improve compliance (if necessary) will be directed at dietary modifications: ADA diet, increased exercise and regular blood sugar monitoring: 3 times daily.    Return in about 1 month (around 2/4/2017). - initial medicare wellness visit    GOALS:  Control of Blood Glucose    BARRIERS TO GOALS:  Compliance    Preventative:  Female Preventative: Mammogram is due, PAP is due - will schedule, Colon cancer screening is up to date   Recommended:Influenza - pt declined  Smoking cessation counseling was provided.  does not drink  eat more fruits and vegetables, decrease soda or juice intake, increase water intake, increase physical activity, reduce portion size, cut out extra servings, reduce fast food  intake and have 3 meals a day    RISK SCORE: 4     Signature  Yadira Delarosa MD PGY2  [place] [address]  Office: [phone]    This document has been electronically signed by Yadira Delarosa MD on January 4, 2017 9:25 AM

## 2017-01-04 NOTE — PROGRESS NOTES
Miss Slater is 56-year-old lady with a recurrent abdominal abscess.  After her last drainage and she has done well at home.  She's continued had do wound care and control her blood sugars and have the wound packed.  She currently has no complaints.    On exam the the wound is small and there is no evidence of purulence or cellulitis or fluctuance and induration.    Mrs. Slater is a 56-year-old lady with a satisfactory healed recurrent abdominal wall abscess.  No further care necessary for me.  She can be discharged from home health for her wound care.

## 2017-01-04 NOTE — PROGRESS NOTES
I have reviewed the notes, assessments, and/or procedures performed by Yadira Delarosa MD , I concur with her/his documentation and assessment and plan for Yamileth Slater.       This document has been electronically signed by Pauline Martinez MD on January 4, 2017 9:34 AM

## 2017-01-04 NOTE — MR AVS SNAPSHOT
Yamileth Slater   1/4/2017 9:00 AM   Office Visit    Dept Phone:  [phone]   Encounter #:  [id number]    Provider:  Yadira Delarosa MD   Department:  [facility]                Your Full Care Plan              Today's Medication Changes          These changes are accurate as of: 1/4/17  9:40 AM.  If you have any questions, ask your nurse or doctor.               New Medication(s)Ordered:     chlorhexidine 4 % external liquid   Commonly known as:  HIBICLENS   Apply  topically Daily.   Started by:  Yadira Delarosa MD            Where to Get Your Medications      These medications were sent to [facility] - [place], [place] - [address] - [phone]  - [phone]   [address] [facility] [address]     Phone:  [phone]     chlorhexidine 4 % external liquid                  Your Updated Medication List          This list is accurate as of: 1/4/17  9:40 AM.  Always use your most recent med list.                acetaminophen 325 MG tablet   Commonly known as:  TYLENOL       atorvastatin 20 MG tablet   Commonly known as:  LIPITOR   Take 1 tablet by mouth Daily.       budesonide-formoterol 160-4.5 MCG/ACT inhaler   Commonly known as:  SYMBICORT   Inhale 2 puffs 2 (Two) Times a Day.       cetirizine 10 MG tablet   Commonly known as:  zyrTEC   Take 1 tablet by mouth Daily.       chlorhexidine 4 % external liquid   Commonly known as:  HIBICLENS   Apply  topically Daily.       clindamycin 150 MG capsule   Commonly known as:  CLEOCIN       clobetasol 0.05 % lotion   Commonly known as:  CLOBEX       clopidogrel 75 MG tablet   Commonly known as:  PLAVIX   Take 1 tablet by mouth Daily.       CVS BLOOD GLUCOSE METER W/DEVICE kit   1 each 3 (Three) Times a Day.       cyclobenzaprine 10 MG tablet   Commonly known as:  FLEXERIL   TAKE 1 TABLET BY MOUTH 2 (TWO) TIMES A DAY AS NEEDED FOR MUSCLE SPASMS.       * fluconazole 100 MG tablet   Commonly  known as:  DIFLUCAN   Take 1 tablet by mouth Daily.       * fluconazole 150 MG tablet   Commonly known as:  DIFLUCAN       furosemide 40 MG tablet   Commonly known as:  LASIX   TAKE 1 TABLET BY MOUTH DAILY.       gabapentin 800 MG tablet   Commonly known as:  NEURONTIN   TAKE 1 TABLET BY MOUTH 3 (THREE) TIMES A DAY.       glucose blood test strip   1 each by Other route 4 (Four) Times a Day. Use as instructed       HYDROcodone-acetaminophen 5-325 MG per tablet   Commonly known as:  NORCO   Take 1 tablet by mouth Every 6 (Six) Hours As Needed for moderate pain (4-6).       * Insulin Glargine 100 UNIT/ML injection pen   Commonly known as:  LANTUS SOLOSTAR   Inject 160 Units under the skin Every Night.       * insulin glargine 100 UNIT/ML injection   Commonly known as:  LANTUS       Insulin Lispro 100 UNIT/ML solution pen-injector   Commonly known as:  HUMALOG KWIKPEN   Inject 45 Units under the skin 3 (Three) Times a Day Before Meals.       lisinopril 10 MG tablet   Commonly known as:  PRINIVIL,ZESTRIL   Take 1 tablet by mouth Daily.       metFORMIN 1000 MG tablet   Commonly known as:  GLUCOPHAGE   Take 1 tablet by mouth 2 (Two) Times a Day With Meals.       metoprolol tartrate 50 MG tablet   Commonly known as:  LOPRESSOR   Take 1 tablet by mouth 2 (Two) Times a Day.       naproxen 500 MG tablet   Commonly known as:  NAPROSYN   Take 1 tablet by mouth 2 (Two) Times a Day With Meals.       nitroglycerin 0.4 MG SL tablet   Commonly known as:  NITROSTAT   Place 1 tablet under the tongue As Needed for chest pain. Take no more than 3 doses in 15 minutes.       NOVOFINE 30G X 8 MM misc   Generic drug:  Insulin Pen Needle       omeprazole-sodium bicarbonate  MG per capsule   Commonly known as:  ZEGERID       pantoprazole 40 MG EC tablet   Commonly known as:  PROTONIX   Take 1 tablet by mouth Daily.       permethrin 5 % cream   Commonly known as:  ELIMITE       potassium chloride 10 MEQ CR tablet   Commonly known as:   K-DUR   Take 1 tablet by mouth Every Night.       promethazine 25 MG tablet   Commonly known as:  PHENERGAN       * Notice:  This list has 4 medication(s) that are the same as other medications prescribed for you. Read the directions carefully, and ask your doctor or other care provider to review them with you.            Instructions     None    Patient Instructions History      Upcoming Appointments     Visit Type Date Time Department    FOLLOW UP 1/4/2017  1:30 PM [facility] ENDOCRINOLOGY [facility]    OFFICE VISIT 1/4/2017  9:00 AM [facility] FM RESIDENT [facility]    POST-OP 1/4/2017  2:20 PM [facility] SURGERY [facility]    OFFICE VISIT 1/27/2017  9:45 AM [facility] [place]      MyChart Signup     Our records indicate that you have declined [facility] sign[facility]. If you would like to sign up for [facility], please email [email or web address] or call [phone] to obtain an activation code.             Other Info from Your Visit           Your Appointments     Jan 04, 2017  1:30 PM CST   Follow Up with BRIDGETT Gamez   [facility] ENDOCRINOLOGY (--)    [address]  [address] [place], [place] [id number] [phone]           Arrive 15 minutes prior to appointment.            Jan 04, 2017  2:20 PM CST   Post-Op with Lucien Dow MD   [facility] GENERAL SURGERY (--)    [address] [place] [address] [address]  [address]  [facility] [address] [phone]            Jan 27, 2017  9:45 AM CST   Office Visit with Yadira Delarosa MD   [facility] FAMILY MEDICINE (--)    [address] [facility] [address] [facility]   [facility] [address] [phone]           Arrive 15 minutes prior to appointment.              Other Notes About Your Plan     Risk Score: 5  CRE positive, requires gloves only, when seen in clinic        Allergies     Other      Bandaids, MRSA, SURECLOSE      Reason for Visit     Diabetes     Wound Check           Vital Signs     Blood Pressure Pulse Height Weight Oxygen  "Saturation Body Mass Index    138/82 (BP Location: Left arm, Patient Position: Sitting) 72 62\" (157.5 cm) 280 lb 1.6 oz (127 kg) 96% 51.23 kg/m2    Smoking Status                   Current Every Day Smoker             "

## 2017-01-04 NOTE — MR AVS SNAPSHOT
Yamileth Slater   1/4/2017 1:30 PM   Office Visit    Dept Phone:  [phone]   Encounter #:  [id number]    Provider:  BRIDGETT Gamez   Department:  [facility] ENDOCRINOLOGY                Your Full Care Plan              Today's Medication Changes          These changes are accurate as of: 1/4/17  1:59 PM.  If you have any questions, ask your nurse or doctor.               New Medication(s)Ordered:     chlorhexidine 4 % external liquid   Commonly known as:  HIBICLENS   Apply  topically Daily.   Started by:  Yadira Delarosa MD            Where to Get Your Medications      These medications were sent to [facility] - [place], [place] - [address] - [phone]  - [phone]   [address] [address]     Phone:  [phone]     chlorhexidine 4 % external liquid                  Your Updated Medication List          This list is accurate as of: 1/4/17  1:59 PM.  Always use your most recent med list.                acetaminophen 325 MG tablet   Commonly known as:  TYLENOL       atorvastatin 20 MG tablet   Commonly known as:  LIPITOR   Take 1 tablet by mouth Daily.       budesonide-formoterol 160-4.5 MCG/ACT inhaler   Commonly known as:  SYMBICORT   Inhale 2 puffs 2 (Two) Times a Day.       cetirizine 10 MG tablet   Commonly known as:  zyrTEC   Take 1 tablet by mouth Daily.       chlorhexidine 4 % external liquid   Commonly known as:  HIBICLENS   Apply  topically Daily.       clindamycin 150 MG capsule   Commonly known as:  CLEOCIN       clobetasol 0.05 % lotion   Commonly known as:  CLOBEX       clopidogrel 75 MG tablet   Commonly known as:  PLAVIX   Take 1 tablet by mouth Daily.       [employer] BLOOD GLUCOSE METER W/DEVICE kit   1 each 3 (Three) Times a Day.       cyclobenzaprine 10 MG tablet   Commonly known as:  FLEXERIL   TAKE 1 TABLET BY MOUTH 2 (TWO) TIMES A DAY AS NEEDED FOR MUSCLE SPASMS.       * fluconazole 100 MG tablet   Commonly  known as:  DIFLUCAN   Take 1 tablet by mouth Daily.       * fluconazole 150 MG tablet   Commonly known as:  DIFLUCAN       furosemide 40 MG tablet   Commonly known as:  LASIX   TAKE 1 TABLET BY MOUTH DAILY.       gabapentin 800 MG tablet   Commonly known as:  NEURONTIN   TAKE 1 TABLET BY MOUTH 3 (THREE) TIMES A DAY.       glucose blood test strip   1 each by Other route 4 (Four) Times a Day. Use as instructed       HYDROcodone-acetaminophen 5-325 MG per tablet   Commonly known as:  NORCO   Take 1 tablet by mouth Every 6 (Six) Hours As Needed for moderate pain (4-6).       * Insulin Glargine 100 UNIT/ML injection pen   Commonly known as:  LANTUS SOLOSTAR   Inject 160 Units under the skin Every Night.       * insulin glargine 100 UNIT/ML injection   Commonly known as:  LANTUS       Insulin Lispro 100 UNIT/ML solution pen-injector   Commonly known as:  HUMALOG KWIKPEN   Inject 45 Units under the skin 3 (Three) Times a Day Before Meals.       lisinopril 10 MG tablet   Commonly known as:  PRINIVIL,ZESTRIL   Take 1 tablet by mouth Daily.       metFORMIN 1000 MG tablet   Commonly known as:  GLUCOPHAGE   Take 1 tablet by mouth 2 (Two) Times a Day With Meals.       metoprolol tartrate 50 MG tablet   Commonly known as:  LOPRESSOR   Take 1 tablet by mouth 2 (Two) Times a Day.       naproxen 500 MG tablet   Commonly known as:  NAPROSYN   Take 1 tablet by mouth 2 (Two) Times a Day With Meals.       nitroglycerin 0.4 MG SL tablet   Commonly known as:  NITROSTAT   Place 1 tablet under the tongue As Needed for chest pain. Take no more than 3 doses in 15 minutes.       NOVOFINE 30G X 8 MM misc   Generic drug:  Insulin Pen Needle       omeprazole-sodium bicarbonate  MG per capsule   Commonly known as:  ZEGERID       pantoprazole 40 MG EC tablet   Commonly known as:  PROTONIX   Take 1 tablet by mouth Daily.       permethrin 5 % cream   Commonly known as:  ELIMITE       potassium chloride 10 MEQ CR tablet   Commonly known as:   K-DUR   Take 1 tablet by mouth Every Night.       promethazine 25 MG tablet   Commonly known as:  PHENERGAN       * Notice:  This list has 4 medication(s) that are the same as other medications prescribed for you. Read the directions carefully, and ask your doctor or other care provider to review them with you.            You Were Diagnosed With        Codes Comments    Diabetes mellitus type 2 in obese    -  Primary ICD-10-CM: E11.9, E66.9  ICD-9-CM: 250.00, 278.00     Vitamin D deficiency     ICD-10-CM: E55.9  ICD-9-CM: 268.9     Neurologic disorder associated with diabetes mellitus     ICD-10-CM: E11.49  ICD-9-CM: 250.60, 349.9     Dyslipidemia     ICD-10-CM: E78.5  ICD-9-CM: 272.4     Essential hypertension     ICD-10-CM: I10  ICD-9-CM: 401.9       Instructions     None    Patient Instructions History      Upcoming Appointments     Visit Type Date Time Department    FOLLOW UP 1/4/2017  1:30 PM [facility] ENDOCRINOLOGY MAD    OFFICE VISIT 1/4/2017  9:00 AM [facility] FM RESIDENT MAD    POST-OP 1/4/2017  2:20 PM [facility] GEN SURGERY MAD    OFFICE VISIT 1/27/2017  9:45 AM [facility] FM RESIDENT MAD    WELLNESS 1/27/2017 10:15 AM [facility] FM RESIDENT MAD    FOLLOW UP 1/27/2017 11:30 AM [facility] ENDOCRINOLOGY [facility]      MyChart Signup     Our records indicate that you have declined [facility] MyChart signup. If you would like to sign up for MyChart, please email [email or web address] or call [phone] to obtain an activation code.             Other Info from Your Visit           Your Appointments     Jan 04, 2017  2:20 PM CST   Post-Op with Lucien Dow MD   [facility] GENERAL SURGERY (--)    [address] [place] [address] [address]  [facility] [place] [address] [place] [address] [phone]            Jan 27, 2017  9:45 AM CST   Office Visit with Yadira Delarosa MD   [facility] FAMILY MEDICINE (--)    [address] [place] [address] [phone]           Arrive 15 minutes prior to appointment.            Jan  "27, 2017 10:15 AM CST   Wellness with Yadira Delarosa MD   [facility] FAMILY MEDICINE (--)    [address]   [place] [address] [phone]            Jan 27, 2017 11:30 AM CST   Follow Up with BRIDGETT Gamez   [facility] ENDOCRINOLOGY (--)    [address]  [facility] [place] [address] [facility] [address] [phone]           Arrive 15 minutes prior to appointment.              Other Notes About Your Plan     Risk Score: 5  CRE positive, requires gloves only, when seen in clinic        Allergies     Other      Bandaids, MRSA, SURECLOSE      Reason for Visit     [facility]      Vital Signs     Blood Pressure Pulse Height Weight Body Mass Index Smoking Status    136/78 (BP Location: Right arm, Patient Position: Sitting, Cuff Size: Adult) 88 62\" (157.5 cm) 282 lb 3.2 oz (128 kg) 51.62 kg/m2 Current Every Day Smoker      Problems and Diagnoses Noted     Diabetes mellitus type 2 in obese    Dyslipidemia    High blood pressure    Neurologic disorder associated with diabetes mellitus    Vitamin D deficiency        "

## 2017-01-27 ENCOUNTER — OFFICE VISIT (OUTPATIENT)
Dept: FAMILY MEDICINE CLINIC | Facility: CLINIC | Age: 57
End: 2017-01-27

## 2017-01-27 ENCOUNTER — OFFICE VISIT (OUTPATIENT)
Dept: ENDOCRINOLOGY | Facility: CLINIC | Age: 57
End: 2017-01-27

## 2017-01-27 VITALS
WEIGHT: 281.5 LBS | BODY MASS INDEX: 51.8 KG/M2 | SYSTOLIC BLOOD PRESSURE: 143 MMHG | HEART RATE: 89 BPM | DIASTOLIC BLOOD PRESSURE: 82 MMHG | HEIGHT: 62 IN

## 2017-01-27 VITALS
BODY MASS INDEX: 51.32 KG/M2 | HEART RATE: 98 BPM | OXYGEN SATURATION: 98 % | DIASTOLIC BLOOD PRESSURE: 80 MMHG | WEIGHT: 278.9 LBS | HEIGHT: 62 IN | SYSTOLIC BLOOD PRESSURE: 121 MMHG

## 2017-01-27 DIAGNOSIS — IMO0002 UNCONTROLLED TYPE 2 DIABETES MELLITUS WITH COMPLICATION, WITH LONG-TERM CURRENT USE OF INSULIN: Primary | ICD-10-CM

## 2017-01-27 DIAGNOSIS — F17.200 TOBACCO DEPENDENCE SYNDROME: ICD-10-CM

## 2017-01-27 DIAGNOSIS — R52 PAIN: Primary | ICD-10-CM

## 2017-01-27 DIAGNOSIS — Z79.4 TYPE 2 DIABETES MELLITUS WITH OTHER SKIN COMPLICATION, WITH LONG-TERM CURRENT USE OF INSULIN (HCC): ICD-10-CM

## 2017-01-27 DIAGNOSIS — E11.49 NEUROLOGIC DISORDER ASSOCIATED WITH DIABETES MELLITUS (HCC): ICD-10-CM

## 2017-01-27 DIAGNOSIS — E78.5 DYSLIPIDEMIA: ICD-10-CM

## 2017-01-27 DIAGNOSIS — E55.9 VITAMIN D DEFICIENCY: ICD-10-CM

## 2017-01-27 DIAGNOSIS — I10 ESSENTIAL HYPERTENSION: ICD-10-CM

## 2017-01-27 DIAGNOSIS — E66.9 DIABETES MELLITUS TYPE 2 IN OBESE (HCC): ICD-10-CM

## 2017-01-27 DIAGNOSIS — E11.69 DIABETES MELLITUS TYPE 2 IN OBESE (HCC): ICD-10-CM

## 2017-01-27 DIAGNOSIS — E11.628 TYPE 2 DIABETES MELLITUS WITH OTHER SKIN COMPLICATION, WITH LONG-TERM CURRENT USE OF INSULIN (HCC): ICD-10-CM

## 2017-01-27 PROCEDURE — 99214 OFFICE O/P EST MOD 30 MIN: CPT | Performed by: NURSE PRACTITIONER

## 2017-01-27 PROCEDURE — 99213 OFFICE O/P EST LOW 20 MIN: CPT | Performed by: FAMILY MEDICINE

## 2017-01-27 RX ORDER — NAPROXEN 500 MG/1
500 TABLET ORAL 2 TIMES DAILY WITH MEALS
Qty: 60 TABLET | Refills: 3 | Status: SHIPPED | OUTPATIENT
Start: 2017-01-27 | End: 2017-06-12 | Stop reason: SDUPTHER

## 2017-01-27 RX ORDER — GABAPENTIN 800 MG/1
800 TABLET ORAL 3 TIMES DAILY
Qty: 90 TABLET | Refills: 3 | Status: SHIPPED | OUTPATIENT
Start: 2017-01-27 | End: 2017-03-31 | Stop reason: SDUPTHER

## 2017-01-27 RX ORDER — FUROSEMIDE 40 MG/1
40 TABLET ORAL DAILY
Qty: 30 TABLET | Refills: 3 | Status: SHIPPED | OUTPATIENT
Start: 2017-01-27 | End: 2017-05-16 | Stop reason: SDUPTHER

## 2017-01-27 RX ORDER — INSULIN GLARGINE 100 [IU]/ML
90 INJECTION, SOLUTION SUBCUTANEOUS EVERY 12 HOURS
Qty: 1 EACH | Refills: 11 | Status: ON HOLD | OUTPATIENT
Start: 2017-01-27 | End: 2017-03-21

## 2017-01-27 RX ORDER — CLOBETASOL PROPIONATE 0.5 MG/ML
LOTION TOPICAL
Qty: 118 G | Refills: 3 | Status: SHIPPED | OUTPATIENT
Start: 2017-01-27 | End: 2017-03-31 | Stop reason: SDUPTHER

## 2017-01-27 RX ORDER — METFORMIN HYDROCHLORIDE 1000 MG/1
TABLET, FILM COATED, EXTENDED RELEASE ORAL
Refills: 3 | COMMUNITY
Start: 2017-01-05 | End: 2017-03-21 | Stop reason: HOSPADM

## 2017-01-27 RX ORDER — METOPROLOL TARTRATE 50 MG/1
50 TABLET, FILM COATED ORAL 2 TIMES DAILY
Qty: 60 TABLET | Refills: 3 | Status: SHIPPED | OUTPATIENT
Start: 2017-01-27 | End: 2017-06-01 | Stop reason: SDUPTHER

## 2017-01-27 RX ORDER — CYCLOBENZAPRINE HCL 10 MG
10 TABLET ORAL 2 TIMES DAILY
Qty: 60 TABLET | Refills: 2 | Status: SHIPPED | OUTPATIENT
Start: 2017-01-27 | End: 2017-03-31 | Stop reason: SDUPTHER

## 2017-01-27 NOTE — PROGRESS NOTES
Case discussed with family medicine resident and agree with assessment and plan.     Nirmal Pierson, DO

## 2017-01-27 NOTE — PROGRESS NOTES
"  Subjective:     Yamileth Slater is a 56 y.o. female who presents for refills of medications for uncontrolled DM2 with hyperglycemia.  She has an appt to see Laz Patriica today.    Diabetes HPI:  The patient was initially diagnosed with Type 2 diabetes mellitus based on the following criteria:  Elevated A1C and random blood glucose.    Known diabetic complications: peripheral neuropathy and cardiovascular disease  Cardiovascular risk factors: diabetes mellitus, dyslipidemia, hypertension, obesity (BMI >= 30 kg/m2), sedentary lifestyle and smoking/ tobacco exposure  Current diabetic medications include Metformin 1000 mg BID; Lantus 90u qhs & 90u qAM; Humalog 45-60u sliding scale with meals.     Eye exam current (within one year): no   Weight trend: increasing steadily  Prior visit with dietician: yes - in hospital  Current diet: in general, an \"unhealthy\" diet, but has been avoiding [employer]h as recommended  Current exercise: none    Current monitoring regimen: home blood tests - daily  Home blood sugar records: checks irregularly, sometimes fasting, sometimes during the day.  Values: less than 120  Any episodes of hypoglycemia? no     ACE inhibitor or angiotensin II receptor blocker?     Yes     lisinopril (generic)        Statin?     Yes    ASA?     Yes     HEMOGLOBIN A1C (%TotHgb)   Date Value   12/12/2016 11.8 (H)   11/17/2016 10.9 (H)   08/09/2016 9.1 (H)       ASSOCIATED SYMPTOMS:     FatigueNo   MalaiseNo    DiaphoresisNo    Weight lossNo    Weight gainYes    Visual impairmentNo   Chest painNo    PalpitationsNo    TachycardiaNo   ClaudicationNo    PolyphagiaYes    PolydipsiaYes    PolyuriaYes    NumbnessYes    Foot painNo    Skin lesionsNo    Memory lossNo     COMORBID CONDITIONS:     ObesityYes    HypothyroidismNo    HyperlipidemiaYes    CADYes    Cerebrovascular diseaseNo   PVDNo    HTNYes    Sexual dysfunctionNo    DepressionNo      The following portions of the patient's history were reviewed and " updated as appropriate: allergies, current medications, past family history, past medical history, past social history, past surgical history and problem list.    Preventative:  Over the past 2 weeks, have you felt down, depressed, or hopeless?No   Over the past 2 weeks, have you felt little interest or pleasure in doing things?No  Clinical depression screening refused by patient.No     On osteoporosis therapy?No     Past Medical Hx:  Past Medical History   Diagnosis Date   • Anxiety states    • Asthma    • Candidiasis of mouth    • Candidiasis of vulva and vagina    • Carotid artery stenosis      DWIGHT 0-15%, LICA 0-15%. LICA stent 5/2014.   • Chest pain    • Chronic folliculitis    • Chronic obstructive lung disease    • Coronary arteriosclerosis    • Diabetes mellitus      no retinopathy; A1C 9.1      • Dyslipidemia    • Dysphagia    • Encounter for general adult medical examination with abnormal findings    • Essential hypertension    • Excoriated eczema      asteosis/keratosis   • Furuncle of neck    • GERD (gastroesophageal reflux disease)    • History of colon polyps    • Hypertensive disorder    • Infection of skin and subcutaneous tissue      rt perineal abscess   • Infestation by Sarcoptes scabiei deepak hominis    • Kidney stone    • Leukocytosis    • Lower limb anomaly      Abscess of lower limb - ANTX causing n/v      • Mixed hyperlipidemia    • Morbid obesity due to excess calories      BMI 44.5   • Need for vaccination    • Neurologic disorder associated with diabetes mellitus    • Non-healing surgical wound      LLE EVHa   • Pain      LLE   • Peripheral vascular disease    • Rash    • Shoulder joint pain    • Sleep apnea    • Surgical follow-up care      5/27/14 L carotid stent   • Tobacco dependence syndrome      1/2ppd      • Type 2 diabetes mellitus    • Viral wart      RIGHT middle phalanx   • Vitamin D deficiency        Past Surgical Hx:  Past Surgical History   Procedure Laterality Date   •  Coronary artery bypass graft  11/15/2013     CABG x 3 with LIMA to LAD and coronary endarterectomy to LAD, SVG to Ramus intermedius branch and SVG to PDA.    • Cardiac catheterization  08/09/2013     Severe multivessel CAD with critical lesions noted in the RCA, obtuse marginal two, ramus intermemdious, and LAD as described above. Normal LV systolic function with no wall motion abnormality.    • Cardiac catheterization  05/27/2014      LEFT Carotid Stent    • Colonoscopy  05/02/2016     Normal colon.Diverticulosis in the sigmoid colon.No specimens collected.    • Other surgical history  01/25/2016     DESTRUCTION OF BENIGN LESION      • Endoscopy  09/23/2015      Esophageal stricture present.Normal stomach.Normal duodenum.    • Endoscopy  11/16/2015     w/ dilatation - Esophageal stricture present,Dilatation performed.Normal stomach and duodenum.    • Other surgical history  04/18/2014     ear/neck - L attempted CEA, Neck Dissection    • Incision and drainage abscess  01/02/2016     Incision and drainage of right perineal abscess.    • Incision and drainage abscess  02/18/2014     Incision and Drainage of abscess of left lower leg        Health Maintenance:  Immunization History   Administered Date(s) Administered   • Hepatitis B 06/11/2015, 10/20/2015, 05/13/2016   • Influenza Split High Dose Preservative Free IM 11/10/2014   • Pneumococcal Polysaccharide 01/01/2012   • Tdap 11/10/2014     Health Maintenance   Topic Date Due   • DIABETIC EYE EXAM  08/24/2016   • MEDICARE ANNUAL WELLNESS  08/24/2016   • HEMOGLOBIN A1C  06/12/2017   • URINE MICROALBUMIN  10/03/2017   • DIABETIC FOOT EXAM  11/09/2017   • LIPID PANEL  11/09/2017   • MAMMOGRAM  01/04/2019   • PAP SMEAR  01/04/2020   • TDAP/TD VACCINES (2 - Td) 11/10/2024   • COLONOSCOPY  05/02/2026   • PNEUMOCOCCAL VACCINE (19-64 MEDIUM RISK)  Completed   • HEPATITIS C SCREENING  Completed   • INFLUENZA VACCINE  Addressed       Current Meds:    Current Outpatient  Prescriptions:   •  atorvastatin (LIPITOR) 20 MG tablet, Take 1 tablet by mouth Daily., Disp: 30 tablet, Rfl: 3  •  Blood Glucose Monitoring Suppl ([employer]S BLOOD GLUCOSE METER) W/DEVICE kit, 1 each 3 (Three) Times a Day., Disp: 1 kit, Rfl: 3  •  budesonide-formoterol (SYMBICORT) 160-4.5 MCG/ACT inhaler, Inhale 2 puffs 2 (Two) Times a Day., Disp: 1 inhaler, Rfl: 6  •  cetirizine (zyrTEC) 10 MG tablet, Take 1 tablet by mouth Daily., Disp: 30 tablet, Rfl: 3  •  chlorhexidine (HIBICLENS) 4 % external liquid, Apply  topically Daily., Disp: 118 mL, Rfl: 0  •  clobetasol (CLOBEX) 0.05 % lotion, Apply  topically. apply to skin bid x 1-2w then prn. generic, Disp: , Rfl:   •  clopidogrel (PLAVIX) 75 MG tablet, Take 1 tablet by mouth Daily., Disp: 30 tablet, Rfl: 3  •  cyclobenzaprine (FLEXERIL) 10 MG tablet, TAKE 1 TABLET BY MOUTH 2 (TWO) TIMES A DAY AS NEEDED FOR MUSCLE SPASMS., Disp: 60 tablet, Rfl: 0  •  furosemide (LASIX) 40 MG tablet, TAKE 1 TABLET BY MOUTH DAILY., Disp: 30 tablet, Rfl: 0  •  gabapentin (NEURONTIN) 800 MG tablet, TAKE 1 TABLET BY MOUTH 3 (THREE) TIMES A DAY., Disp: 90 tablet, Rfl: 0  •  glucose blood test strip, 1 each by Other route 4 (Four) Times a Day. Use as instructed, Disp: 100 each, Rfl: 12  •  Insulin Glargine (LANTUS SOLOSTAR) 100 UNIT/ML injection pen, Inject 160 Units under the skin Every Night., Disp: 75 mL, Rfl: 4  •  insulin glargine (LANTUS) 100 UNIT/ML injection, Inject 90 Units under the skin Every 12 (Twelve) Hours., Disp: , Rfl:   •  Insulin Lispro (HUMALOG KWIKPEN) 100 UNIT/ML solution pen-injector, Inject 45 Units under the skin 3 (Three) Times a Day Before Meals., Disp: 60 mL, Rfl: 4  •  Insulin Pen Needle (NOVOFINE) 30G X 8 MM misc, 4 (four) times a day., Disp: , Rfl:   •  lisinopril (PRINIVIL,ZESTRIL) 10 MG tablet, Take 1 tablet by mouth Daily., Disp: 30 tablet, Rfl: 3  •  metFORMIN (GLUCOPHAGE) 1000 MG tablet, Take 1 tablet by mouth 2 (Two) Times a Day With Meals., Disp: 60 tablet,  Rfl: 3  •  metFORMIN (GLUMETZA) 1000 MG (MOD) 24 hr tablet, , Disp: , Rfl: 3  •  metoprolol tartrate (LOPRESSOR) 50 MG tablet, Take 1 tablet by mouth 2 (Two) Times a Day., Disp: 60 tablet, Rfl: 3  •  naproxen (NAPROSYN) 500 MG tablet, Take 1 tablet by mouth 2 (Two) Times a Day With Meals., Disp: 60 tablet, Rfl: 1  •  nitroglycerin (NITROSTAT) 0.4 MG SL tablet, Place 1 tablet under the tongue As Needed for chest pain. Take no more than 3 doses in 15 minutes., Disp: 30 tablet, Rfl: 3  •  omeprazole-sodium bicarbonate (ZEGERID)  MG per capsule, , Disp: , Rfl: 3  •  pantoprazole (PROTONIX) 40 MG EC tablet, Take 1 tablet by mouth Daily., Disp: 30 tablet, Rfl: 3  •  permethrin (ELIMITE) 5 % cream, Apply  topically. Massage into skin from head to feet, remove 8-14 hours later with water, Disp: , Rfl:   •  potassium chloride (K-DUR) 10 MEQ CR tablet, Take 1 tablet by mouth Every Night., Disp: 30 tablet, Rfl: 3  •  promethazine (PHENERGAN) 25 MG tablet, Take 25 mg by mouth every 6 (six) hours as needed for nausea or vomiting., Disp: , Rfl:     Allergies:  Other    Family Hx:  Family History   Problem Relation Age of Onset   • Coronary artery disease Other    • Diabetes Other    • Heart disease Other    • Hypertension Other         Social History:  Social History     Social History   • Marital status:      Spouse name: N/A   • Number of children: N/A   • Years of education: N/A     Occupational History   • Not on file.     Social History Main Topics   • Smoking status: Current Every Day Smoker     Types: Cigarettes   • Smokeless tobacco: Not on file   • Alcohol use No   • Drug use: No   • Sexual activity: Not on file     Other Topics Concern   • Not on file     Social History Narrative       Review of Systems  Review of Systems   Constitutional: Negative for activity change, appetite change, chills, diaphoresis, fatigue and unexpected weight change.   HENT: Negative for congestion, facial swelling, postnasal  "drip, rhinorrhea, sinus pressure, sneezing and sore throat.    Eyes: Negative for photophobia and visual disturbance.   Respiratory: Negative for cough, chest tightness, shortness of breath and wheezing.    Cardiovascular: Negative for chest pain, palpitations and leg swelling.   Gastrointestinal: Negative for abdominal pain, constipation, diarrhea, nausea and vomiting.   Endocrine: Positive for polydipsia, polyphagia and polyuria.   Genitourinary: Negative for difficulty urinating, dysuria and urgency.   Musculoskeletal: Negative for arthralgias, gait problem, joint swelling and neck stiffness.   Skin: Negative for color change, pallor and rash.   Neurological: Positive for numbness. Negative for seizures, syncope, speech difficulty, weakness, light-headedness and headaches.   Psychiatric/Behavioral: Negative for decreased concentration, dysphoric mood, sleep disturbance and suicidal ideas. The patient is not nervous/anxious.        Objective:     Visit Vitals   • /80 (BP Location: Left arm, Patient Position: Sitting)   • Pulse 98   • Ht 62\" (157.5 cm)   • Wt 278 lb 14.4 oz (127 kg)   • SpO2 98%   • BMI 51.01 kg/m2       Physical Exam   Constitutional: No distress.   Obese   HENT:   Head: Normocephalic and atraumatic.   Nose: Nose normal.   Mouth/Throat: Oropharynx is clear and moist. No oropharyngeal exudate.   Eyes: Conjunctivae and EOM are normal. Pupils are equal, round, and reactive to light. No scleral icterus.   Neck: Normal range of motion. Neck supple. No tracheal deviation present.   Cardiovascular: Normal rate, regular rhythm, normal heart sounds and intact distal pulses.    Pulmonary/Chest: Effort normal and breath sounds normal. No respiratory distress.   Abdominal: Soft. Bowel sounds are normal. She exhibits no distension. There is no tenderness.   Musculoskeletal: Normal range of motion.   Neurological: She is alert. Gait normal.   Skin: Skin is warm and dry. She is not diaphoretic. "   Psychiatric: Mood, memory, affect and judgment normal.     Diabetic foot exam:   Left: Filament test present              [place] test not examined   Pulses wnl   Reflexes present   Vibratory sensation diminished   Proprioception normal   Sharp/dull discrimination diminished       Right: Filament test present              [place] test not examined   Pulses present   Reflexes wnl   Vibratory sensation diminished   Proprioception normal   Sharp/dull discrimination diminished     Feet - warm, good capillary refill       Lab Review  GLUCOSE (mg/dl)   Date Value   12/14/2016 101 (H)   12/13/2016 210 (H)   12/12/2016 415 (H)     SODIUM (mmol/L)   Date Value   12/14/2016 141   12/13/2016 140   12/12/2016 136 (L)     POTASSIUM (mmol/L)   Date Value   12/14/2016 4.1   12/13/2016 4.3   12/12/2016 4.1     CHLORIDE (mmol/L)   Date Value   12/14/2016 102   12/13/2016 102   12/12/2016 97     CO2 (mmol/L)   Date Value   12/14/2016 28   12/13/2016 25   12/12/2016 24     BUN (mg/dl)   Date Value   12/14/2016 16   12/13/2016 20   12/12/2016 36 (H)     CREATININE (mg/dl)   Date Value   12/14/2016 0.9   12/13/2016 0.9   12/12/2016 1.3 (H)     HEMOGLOBIN A1C (%TotHgb)   Date Value   12/12/2016 11.8 (H)   11/17/2016 10.9 (H)   08/09/2016 9.1 (H)     TRIGLYCERIDES (mg/dl)   Date Value   11/09/2016 >525 (H)   08/10/2016 310 (H)   03/27/2015 295 (H)       none     Assessment:     Yamileth was seen today for med refill and follow-up.    Diagnoses and all orders for this visit:    Pain  -     naproxen (NAPROSYN) 500 MG tablet; Take 1 tablet by mouth 2 (Two) Times a Day With Meals.    Diabetes mellitus type 2 in obese  -     gabapentin (NEURONTIN) 800 MG tablet; Take 1 tablet by mouth 3 (Three) Times a Day.  -     Insulin Lispro (HUMALOG KWIKPEN) 100 UNIT/ML solution pen-injector; Inject 45 Units under the skin 3 (Three) Times a Day Before Meals.    Type 2 diabetes mellitus with other skin complication, with long-term current use of insulin  -      glucose blood test strip; 1 each by Other route 4 (Four) Times a Day. Use as instructed    Other orders  -     clobetasol (CLOBEX) 0.05 % lotion; apply to skin bid x 1-2w then prn. generic  -     cyclobenzaprine (FLEXERIL) 10 MG tablet; Take 1 tablet by mouth 2 (Two) Times a Day.  -     furosemide (LASIX) 40 MG tablet; Take 1 tablet by mouth Daily.  -     insulin glargine (LANTUS) 100 UNIT/ML injection; Inject 90 Units under the skin Every 12 (Twelve) Hours.  -     Insulin Pen Needle (NOVOFINE) 30G X 8 MM misc; As directed 4 times daily  -     metoprolol tartrate (LOPRESSOR) 50 MG tablet; Take 1 tablet by mouth 2 (Two) Times a Day.        Plan:     1.  Rx changes: none  2.  Education: Reviewed ‘ABCs’ of diabetes management (respective goals in parentheses):  A1C (<7), preprandial glucose (70 mg/dl - 130 mg/dl), postprandial glucose (< 180 mg/dl), blood pressure (<130/80), and cholesterol (LDL <100 mg/dl; HDL > 50 mg/dl; Trig < 150 mg/dl).  3.  Issues reviewed with Yamileth Slater: low cholesterol diet, weight control and daily exercise discussed, home glucose monitoring emphasized, foot care discussed and Podiatry visits discussed, annual eye examinations at Ophthalmology discussed, long term diabetic complications discussed, patient urged in the strongest terms to quit smoking and follow ADA diet.  4.  Compliance at present is estimated to be fair. Efforts to improve compliance (if necessary) will be directed at dietary modifications: ADA diet, increased exercise and regular blood sugar monitoring: 3 times daily.    Return for Next scheduled follow up, Medicare Wellness. - initial medicare wellness visit    GOALS:  Control of Blood Glucose    BARRIERS TO GOALS:  Compliance    Preventative:  Female Preventative: Mammogram is due, PAP is due - will schedule, Colon cancer screening is up to date   Recommended:Influenza - pt declined  Smoking cessation counseling was provided.  does not drink  eat more fruits  and vegetables, decrease soda or juice intake, increase water intake, increase physical activity, reduce portion size, cut out extra servings, reduce fast food intake and have 3 meals a day    RISK SCORE: 4     Signature  Yadira Delarosa MD PGY2  [place] [address]  Office: [phone]    This document has been electronically signed by Yadira Delarosa MD on January 27, 2017 10:02 AM

## 2017-01-27 NOTE — PROGRESS NOTES
Subjective    Yamileth Slater is a 56 y.o. female. she is here today for follow-up.    History of Present Illness     History of Present Illness  History of Present Illness  Duration/Timing: Diabetes mellitus type 2, Age at onset of diabetes: 33 years, Onset of symptoms gradual  Timing - constant  Quality - uncontrolled  Severity - moderate     Severity (Complications/Hospitalizations)  Secondary Macrovascular Complications: CAD, PAD  PTCA stent in May 2014     left carotid stenst     left fem pop     CABG x 3, 2014     Context  Diabetes Regimen: Insulin  Blood Glucose Readings     No longer lows    Highest 220    One day 318 but ate a cake      Diet  trying to eat 60 g cho per meal   Exercise: Does not exercise     Associated Signs/Symptoms  Hyperglycemic Symptoms: No polyuria, No polydipsia, No polyphagia  Hypoglycemic Episodes: No documented hypoglycemia  Modifying Factors/Comorbidities  Alleviating - family support, compliance              The following portions of the patient's history were reviewed and updated as appropriate:   Past Medical History   Diagnosis Date   • Anxiety states    • Asthma    • Candidiasis of mouth    • Candidiasis of vulva and vagina    • Carotid artery stenosis      DWIGHT 0-15%, LICA 0-15%. LICA stent 5/2014.   • Chest pain    • Chronic folliculitis    • Chronic obstructive lung disease    • Coronary arteriosclerosis    • Diabetes mellitus      no retinopathy; A1C 9.1      • Dyslipidemia    • Dysphagia    • Encounter for general adult medical examination with abnormal findings    • Essential hypertension    • Excoriated eczema      asteosis/keratosis   • Furuncle of neck    • GERD (gastroesophageal reflux disease)    • History of colon polyps    • Hypertensive disorder    • Infection of skin and subcutaneous tissue      rt perineal abscess   • Infestation by Sarcoptes scabiei deepak hominis    • Kidney stone    • Leukocytosis    • Lower limb anomaly      Abscess of lower limb - ANTX  causing n/v      • Mixed hyperlipidemia    • Morbid obesity due to excess calories      BMI 44.5   • Need for vaccination    • Neurologic disorder associated with diabetes mellitus    • Non-healing surgical wound      LLE EVHa   • Pain      LLE   • Peripheral vascular disease    • Rash    • Shoulder joint pain    • Sleep apnea    • Surgical follow-up care      5/27/14 L carotid stent   • Tobacco dependence syndrome      1/2ppd      • Type 2 diabetes mellitus    • Viral wart      RIGHT middle phalanx   • Vitamin D deficiency      Past Surgical History   Procedure Laterality Date   • Coronary artery bypass graft  11/15/2013     CABG x 3 with LIMA to LAD and coronary endarterectomy to LAD, SVG to Ramus intermedius branch and SVG to PDA.    • Cardiac catheterization  08/09/2013     Severe multivessel CAD with critical lesions noted in the RCA, obtuse marginal two, ramus intermemdious, and LAD as described above. Normal LV systolic function with no wall motion abnormality.    • Cardiac catheterization  05/27/2014      LEFT Carotid Stent    • Colonoscopy  05/02/2016     Normal colon.Diverticulosis in the sigmoid colon.No specimens collected.    • Other surgical history  01/25/2016     DESTRUCTION OF BENIGN LESION      • Endoscopy  09/23/2015      Esophageal stricture present.Normal stomach.Normal duodenum.    • Endoscopy  11/16/2015     w/ dilatation - Esophageal stricture present,Dilatation performed.Normal stomach and duodenum.    • Other surgical history  04/18/2014     ear/neck - L attempted CEA, Neck Dissection    • Incision and drainage abscess  01/02/2016     Incision and drainage of right perineal abscess.    • Incision and drainage abscess  02/18/2014     Incision and Drainage of abscess of left lower leg      Family History   Problem Relation Age of Onset   • Coronary artery disease Other    • Diabetes Other    • Heart disease Other    • Hypertension Other      OB History     No data available        Current  Outpatient Prescriptions   Medication Sig Dispense Refill   • atorvastatin (LIPITOR) 20 MG tablet Take 1 tablet by mouth Daily. 30 tablet 3   • Blood Glucose Monitoring Suppl ([employer] BLOOD GLUCOSE METER) W/DEVICE kit 1 each 3 (Three) Times a Day. 1 kit 3   • budesonide-formoterol (SYMBICORT) 160-4.5 MCG/ACT inhaler Inhale 2 puffs 2 (Two) Times a Day. 1 inhaler 6   • cetirizine (zyrTEC) 10 MG tablet Take 1 tablet by mouth Daily. 30 tablet 3   • chlorhexidine (HIBICLENS) 4 % external liquid Apply  topically Daily. 118 mL 0   • clobetasol (CLOBEX) 0.05 % lotion apply to skin bid x 1-2w then prn. generic 118 g 3   • clopidogrel (PLAVIX) 75 MG tablet Take 1 tablet by mouth Daily. 30 tablet 3   • cyclobenzaprine (FLEXERIL) 10 MG tablet Take 1 tablet by mouth 2 (Two) Times a Day. 60 tablet 2   • furosemide (LASIX) 40 MG tablet Take 1 tablet by mouth Daily. 30 tablet 3   • gabapentin (NEURONTIN) 800 MG tablet Take 1 tablet by mouth 3 (Three) Times a Day. 90 tablet 3   • glucose blood test strip 1 each by Other route 4 (Four) Times a Day. Use as instructed 100 each 12   • insulin glargine (LANTUS) 100 UNIT/ML injection Inject 90 Units under the skin Every 12 (Twelve) Hours. 1 each 11   • Insulin Lispro (HUMALOG KWIKPEN) 100 UNIT/ML solution pen-injector Inject 45 Units under the skin 3 (Three) Times a Day Before Meals. 60 mL 11   • Insulin Pen Needle (NOVOFINE) 30G X 8 MM misc As directed 4 times daily 100 each 11   • lisinopril (PRINIVIL,ZESTRIL) 10 MG tablet Take 1 tablet by mouth Daily. 30 tablet 3   • metFORMIN (GLUCOPHAGE) 1000 MG tablet Take 1 tablet by mouth 2 (Two) Times a Day With Meals. 60 tablet 3   • metFORMIN (GLUMETZA) 1000 MG (MOD) 24 hr tablet   3   • metoprolol tartrate (LOPRESSOR) 50 MG tablet Take 1 tablet by mouth 2 (Two) Times a Day. 60 tablet 3   • naproxen (NAPROSYN) 500 MG tablet Take 1 tablet by mouth 2 (Two) Times a Day With Meals. 60 tablet 3   • nitroglycerin (NITROSTAT) 0.4 MG SL tablet Place 1  tablet under the tongue As Needed for chest pain. Take no more than 3 doses in 15 minutes. 30 tablet 3   • omeprazole-sodium bicarbonate (ZEGERID)  MG per capsule   3   • pantoprazole (PROTONIX) 40 MG EC tablet Take 1 tablet by mouth Daily. 30 tablet 3   • permethrin (ELIMITE) 5 % cream Apply  topically. Massage into skin from head to feet, remove 8-14 hours later with water     • potassium chloride (K-DUR) 10 MEQ CR tablet Take 1 tablet by mouth Every Night. 30 tablet 3   • promethazine (PHENERGAN) 25 MG tablet Take 25 mg by mouth every 6 (six) hours as needed for nausea or vomiting.       No current facility-administered medications for this visit.      Allergies   Allergen Reactions   • Other      Bandaids, MRSA, SURECLOSE     Social History     Social History   • Marital status:      Spouse name: N/A   • Number of children: N/A   • Years of education: N/A     Social History Main Topics   • Smoking status: Current Every Day Smoker     Types: Cigarettes   • Smokeless tobacco: None   • Alcohol use No   • Drug use: No   • Sexual activity: Not Asked     Other Topics Concern   • None     Social History Narrative       Review of Systems  Review of Systems   Constitutional: Positive for fatigue. Negative for activity change, appetite change and diaphoresis.   HENT: Negative for congestion, dental problem, drooling, ear discharge, ear pain, facial swelling, sneezing, sore throat, tinnitus, trouble swallowing and voice change.    Eyes: Negative for photophobia, pain, discharge, redness, itching and visual disturbance.   Respiratory: Negative for apnea, cough, choking, chest tightness and shortness of breath.    Cardiovascular: Negative for chest pain, palpitations and leg swelling.   Gastrointestinal: Negative for abdominal distention, abdominal pain, constipation, diarrhea, nausea and vomiting.   Endocrine: Negative for cold intolerance, heat intolerance, polydipsia, polyphagia and polyuria.  "  Genitourinary: Negative for difficulty urinating, dysuria, frequency, hematuria and urgency.   Musculoskeletal: Negative for arthralgias, back pain, gait problem, joint swelling, myalgias, neck pain and neck stiffness.   Skin: Negative for color change, pallor, rash and wound.   Allergic/Immunologic: Negative for environmental allergies, food allergies and immunocompromised state.   Neurological: Negative for dizziness, tremors, facial asymmetry, weakness, light-headedness, numbness and headaches.   Hematological: Negative for adenopathy. Does not bruise/bleed easily.   Psychiatric/Behavioral: Negative for agitation, behavioral problems, confusion, decreased concentration and sleep disturbance.        Objective      Visit Vitals   • /82 (BP Location: Left arm, Patient Position: Sitting, Cuff Size: Adult)   • Pulse 89   • Ht 62\" (157.5 cm)   • Wt 281 lb 8 oz (128 kg)   • BMI 51.49 kg/m2     Physical Exam   Constitutional: She is oriented to person, place, and time. She appears well-developed and well-nourished. No distress.   HENT:   Head: Normocephalic and atraumatic.   Right Ear: External ear normal.   Left Ear: External ear normal.   Nose: Nose normal.   Eyes: Conjunctivae and EOM are normal. Pupils are equal, round, and reactive to light.   Neck: Normal range of motion. Neck supple. No tracheal deviation present. No thyromegaly present.   Cardiovascular: Normal rate, regular rhythm and normal heart sounds.    No murmur heard.  Pulmonary/Chest: Effort normal and breath sounds normal. No respiratory distress. She has no wheezes.   Abdominal: Soft. Bowel sounds are normal. There is no tenderness. There is no rebound and no guarding.   Musculoskeletal: Normal range of motion. She exhibits no edema, tenderness or deformity.   Neurological: She is alert and oriented to person, place, and time. No cranial nerve deficit.   Skin: Skin is warm and dry. No rash noted.   Psychiatric: She has a normal mood and affect. " Her behavior is normal. Judgment and thought content normal.       Lab Review  GLUCOSE (mg/dl)   Date Value   12/14/2016 101 (H)   12/13/2016 210 (H)   12/12/2016 415 (H)     SODIUM (mmol/L)   Date Value   12/14/2016 141   12/13/2016 140   12/12/2016 136 (L)     POTASSIUM (mmol/L)   Date Value   12/14/2016 4.1   12/13/2016 4.3   12/12/2016 4.1     CHLORIDE (mmol/L)   Date Value   12/14/2016 102   12/13/2016 102   12/12/2016 97     CO2 (mmol/L)   Date Value   12/14/2016 28   12/13/2016 25   12/12/2016 24     BUN (mg/dl)   Date Value   12/14/2016 16   12/13/2016 20   12/12/2016 36 (H)     CREATININE (mg/dl)   Date Value   12/14/2016 0.9   12/13/2016 0.9   12/12/2016 1.3 (H)     HEMOGLOBIN A1C (%TotHgb)   Date Value   12/12/2016 11.8 (H)   11/17/2016 10.9 (H)   08/09/2016 9.1 (H)     TRIGLYCERIDES (mg/dl)   Date Value   11/09/2016 >525 (H)   08/10/2016 310 (H)   03/27/2015 295 (H)       Assessment/Plan      1. Uncontrolled type 2 diabetes mellitus with complication, with long-term current use of insulin    2. Essential hypertension    3. Dyslipidemia    4. Vitamin D deficiency    5. Neurologic disorder associated with diabetes mellitus    6. Tobacco dependence syndrome    .    Medications prescribed:  Outpatient Encounter Prescriptions as of 1/27/2017   Medication Sig Dispense Refill   • atorvastatin (LIPITOR) 20 MG tablet Take 1 tablet by mouth Daily. 30 tablet 3   • Blood Glucose Monitoring Suppl ([employer] BLOOD GLUCOSE METER) W/DEVICE kit 1 each 3 (Three) Times a Day. 1 kit 3   • budesonide-formoterol (SYMBICORT) 160-4.5 MCG/ACT inhaler Inhale 2 puffs 2 (Two) Times a Day. 1 inhaler 6   • cetirizine (zyrTEC) 10 MG tablet Take 1 tablet by mouth Daily. 30 tablet 3   • chlorhexidine (HIBICLENS) 4 % external liquid Apply  topically Daily. 118 mL 0   • clobetasol (CLOBEX) 0.05 % lotion apply to skin bid x 1-2w then prn. generic 118 g 3   • clopidogrel (PLAVIX) 75 MG tablet Take 1 tablet by mouth Daily. 30 tablet 3   •  cyclobenzaprine (FLEXERIL) 10 MG tablet Take 1 tablet by mouth 2 (Two) Times a Day. 60 tablet 2   • furosemide (LASIX) 40 MG tablet Take 1 tablet by mouth Daily. 30 tablet 3   • gabapentin (NEURONTIN) 800 MG tablet Take 1 tablet by mouth 3 (Three) Times a Day. 90 tablet 3   • glucose blood test strip 1 each by Other route 4 (Four) Times a Day. Use as instructed 100 each 12   • insulin glargine (LANTUS) 100 UNIT/ML injection Inject 90 Units under the skin Every 12 (Twelve) Hours. 1 each 11   • Insulin Lispro (HUMALOG KWIKPEN) 100 UNIT/ML solution pen-injector Inject 45 Units under the skin 3 (Three) Times a Day Before Meals. 60 mL 11   • Insulin Pen Needle (NOVOFINE) 30G X 8 MM misc As directed 4 times daily 100 each 11   • lisinopril (PRINIVIL,ZESTRIL) 10 MG tablet Take 1 tablet by mouth Daily. 30 tablet 3   • metFORMIN (GLUCOPHAGE) 1000 MG tablet Take 1 tablet by mouth 2 (Two) Times a Day With Meals. 60 tablet 3   • metFORMIN (GLUMETZA) 1000 MG (MOD) 24 hr tablet   3   • metoprolol tartrate (LOPRESSOR) 50 MG tablet Take 1 tablet by mouth 2 (Two) Times a Day. 60 tablet 3   • naproxen (NAPROSYN) 500 MG tablet Take 1 tablet by mouth 2 (Two) Times a Day With Meals. 60 tablet 3   • nitroglycerin (NITROSTAT) 0.4 MG SL tablet Place 1 tablet under the tongue As Needed for chest pain. Take no more than 3 doses in 15 minutes. 30 tablet 3   • omeprazole-sodium bicarbonate (ZEGERID)  MG per capsule   3   • pantoprazole (PROTONIX) 40 MG EC tablet Take 1 tablet by mouth Daily. 30 tablet 3   • permethrin (ELIMITE) 5 % cream Apply  topically. Massage into skin from head to feet, remove 8-14 hours later with water     • potassium chloride (K-DUR) 10 MEQ CR tablet Take 1 tablet by mouth Every Night. 30 tablet 3   • promethazine (PHENERGAN) 25 MG tablet Take 25 mg by mouth every 6 (six) hours as needed for nausea or vomiting.     • [DISCONTINUED] clobetasol (CLOBEX) 0.05 % lotion Apply  topically. apply to skin bid x 1-2w then  prn. generic     • [DISCONTINUED] cyclobenzaprine (FLEXERIL) 10 MG tablet TAKE 1 TABLET BY MOUTH 2 (TWO) TIMES A DAY AS NEEDED FOR MUSCLE SPASMS. 60 tablet 0   • [DISCONTINUED] fluconazole (DIFLUCAN) 100 MG tablet Take 1 tablet by mouth Daily. 14 tablet 0   • [DISCONTINUED] fluconazole (DIFLUCAN) 150 MG tablet      • [DISCONTINUED] furosemide (LASIX) 40 MG tablet TAKE 1 TABLET BY MOUTH DAILY. 30 tablet 0   • [DISCONTINUED] gabapentin (NEURONTIN) 800 MG tablet TAKE 1 TABLET BY MOUTH 3 (THREE) TIMES A DAY. 90 tablet 0   • [DISCONTINUED] glucose blood test strip 1 each by Other route 4 (Four) Times a Day. Use as instructed 100 each 12   • [DISCONTINUED] Insulin Glargine (LANTUS SOLOSTAR) 100 UNIT/ML injection pen Inject 160 Units under the skin Every Night. 75 mL 4   • [DISCONTINUED] insulin glargine (LANTUS) 100 UNIT/ML injection Inject 90 Units under the skin Every 12 (Twelve) Hours.     • [DISCONTINUED] Insulin Lispro (HUMALOG KWIKPEN) 100 UNIT/ML solution pen-injector Inject 45 Units under the skin 3 (Three) Times a Day Before Meals. 60 mL 4   • [DISCONTINUED] Insulin Pen Needle (NOVOFINE) 30G X 8 MM misc 4 (four) times a day.     • [DISCONTINUED] metoprolol tartrate (LOPRESSOR) 50 MG tablet Take 1 tablet by mouth 2 (Two) Times a Day. 60 tablet 3   • [DISCONTINUED] naproxen (NAPROSYN) 500 MG tablet Take 1 tablet by mouth 2 (Two) Times a Day With Meals. 60 tablet 1     No facility-administered encounter medications on file as of 1/27/2017.        Orders placed during this encounter include:  Orders Placed This Encounter   Procedures   • Comprehensive Metabolic Panel   • Hemoglobin A1c   • Vitamin D 25 Hydroxy         Glycemic Management  Lantus 100 bid --- taking 90 units pm and 90 units in am if blood sugar is high --decrease to 85 units -= just taking once daily and taking 90 increase to 92     Novolog 45 w meals      change to 30 for small meals       Give 5 units to cover carb snacks        Lipid Management  on  lipitor 20 mg qhs     ldl 60s       Blood Pressure Management    controlled on lisinopril 10 mg daily       Microvascular Complication Monitoring: Neuropathy type sensorial  needs new eye exam         Weight Related: Obesity, Recommended weight loss, Counseled on nutrition    Other Diabetes Related Aspects      nl TSH in 3-15    Preventive care    tobacco abuse    Encouraged smoking cessation                    4. Follow-up: Return in about 3 months (around 4/27/2017) for Recheck.

## 2017-01-27 NOTE — MR AVS SNAPSHOT
Yamileth Slater   1/27/2017 11:30 AM   Office Visit    Dept Phone:  [phone]   Encounter #:  [id number]    Provider:  BRIDGETT Gamez   Department:  [facility] ENDOCRINOLOGY                Your Full Care Plan              Today's Medication Changes          These changes are accurate as of: 1/27/17 11:50 AM.  If you have any questions, ask your nurse or doctor.               Medication(s)that have changed:     clobetasol 0.05 % lotion   Commonly known as:  CLOBEX   apply to skin bid x 1-2w then prn. generic   What changed:  how to take this   Changed by:  Yadira Delarosa MD       cyclobenzaprine 10 MG tablet   Commonly known as:  FLEXERIL   Take 1 tablet by mouth 2 (Two) Times a Day.   What changed:  See the new instructions.   Changed by:  Yadira Delarosa MD       furosemide 40 MG tablet   Commonly known as:  LASIX   Take 1 tablet by mouth Daily.   What changed:  See the new instructions.   Changed by:  Yadira Delarosa MD       gabapentin 800 MG tablet   Commonly known as:  NEURONTIN   Take 1 tablet by mouth 3 (Three) Times a Day.   What changed:  See the new instructions.   Changed by:  Yadira Delarosa MD       insulin glargine 100 UNIT/ML injection   Commonly known as:  LANTUS   Inject 90 Units under the skin Every 12 (Twelve) Hours.   What changed:  Another medication with the same name was removed. Continue taking this medication, and follow the directions you see here.   Changed by:  Yadira Delarosa MD       Insulin Pen Needle 30G X 8 MM misc   Commonly known as:  NOVOFINE   As directed 4 times daily   What changed:    - how to take this  - when to take this  - additional instructions   Changed by:  Yadira Delarosa MD         Stop taking medication(s)listed here:     fluconazole 100 MG tablet   Commonly known as:  DIFLUCAN   Stopped by:  Yadira Delarosa MD           fluconazole 150 MG tablet   Commonly  known as:  DIFLUCAN   Stopped by:  Yadira Delarosa MD                Where to Get Your Medications      These medications were sent to [facility] - [facility] - [address] [facility] - [phone]  - [phone] FX  [address] [facility] [place] [address]     Phone:  [phone]     clobetasol 0.05 % lotion    cyclobenzaprine 10 MG tablet    furosemide 40 MG tablet    gabapentin 800 MG tablet    glucose blood test strip    insulin glargine 100 UNIT/ML injection    Insulin Lispro 100 UNIT/ML solution pen-injector    Insulin Pen Needle 30G X 8 MM misc    metoprolol tartrate 50 MG tablet    naproxen 500 MG tablet                  Your Updated Medication List          This list is accurate as of: 1/27/17 11:50 AM.  Always use your most recent med list.                atorvastatin 20 MG tablet   Commonly known as:  LIPITOR   Take 1 tablet by mouth Daily.       budesonide-formoterol 160-4.5 MCG/ACT inhaler   Commonly known as:  SYMBICORT   Inhale 2 puffs 2 (Two) Times a Day.       cetirizine 10 MG tablet   Commonly known as:  zyrTEC   Take 1 tablet by mouth Daily.       chlorhexidine 4 % external liquid   Commonly known as:  HIBICLENS   Apply  topically Daily.       clobetasol 0.05 % lotion   Commonly known as:  CLOBEX   apply to skin bid x 1-2w then prn. generic       clopidogrel 75 MG tablet   Commonly known as:  PLAVIX   Take 1 tablet by mouth Daily.       [employer] BLOOD GLUCOSE METER W/DEVICE kit   1 each 3 (Three) Times a Day.       cyclobenzaprine 10 MG tablet   Commonly known as:  FLEXERIL   Take 1 tablet by mouth 2 (Two) Times a Day.       furosemide 40 MG tablet   Commonly known as:  LASIX   Take 1 tablet by mouth Daily.       gabapentin 800 MG tablet   Commonly known as:  NEURONTIN   Take 1 tablet by mouth 3 (Three) Times a Day.       glucose blood test strip   1 each by Other route 4 (Four) Times a Day. Use as instructed       insulin glargine 100 UNIT/ML injection   Commonly known as:  LANTUS   Inject 90 Units  under the skin Every 12 (Twelve) Hours.       Insulin Lispro 100 UNIT/ML solution pen-injector   Commonly known as:  HUMALOG KWIKPEN   Inject 45 Units under the skin 3 (Three) Times a Day Before Meals.       Insulin Pen Needle 30G X 8 MM misc   Commonly known as:  NOVOFINE   As directed 4 times daily       lisinopril 10 MG tablet   Commonly known as:  PRINIVIL,ZESTRIL   Take 1 tablet by mouth Daily.       * metFORMIN 1000 MG tablet   Commonly known as:  GLUCOPHAGE   Take 1 tablet by mouth 2 (Two) Times a Day With Meals.       * metFORMIN 1000 MG (MOD) 24 hr tablet   Commonly known as:  GLUMETZA       metoprolol tartrate 50 MG tablet   Commonly known as:  LOPRESSOR   Take 1 tablet by mouth 2 (Two) Times a Day.       naproxen 500 MG tablet   Commonly known as:  NAPROSYN   Take 1 tablet by mouth 2 (Two) Times a Day With Meals.       nitroglycerin 0.4 MG SL tablet   Commonly known as:  NITROSTAT   Place 1 tablet under the tongue As Needed for chest pain. Take no more than 3 doses in 15 minutes.       omeprazole-sodium bicarbonate  MG per capsule   Commonly known as:  ZEGERID       pantoprazole 40 MG EC tablet   Commonly known as:  PROTONIX   Take 1 tablet by mouth Daily.       permethrin 5 % cream   Commonly known as:  ELIMITE       potassium chloride 10 MEQ CR tablet   Commonly known as:  K-DUR   Take 1 tablet by mouth Every Night.       promethazine 25 MG tablet   Commonly known as:  PHENERGAN       * Notice:  This list has 2 medication(s) that are the same as other medications prescribed for you. Read the directions carefully, and ask your doctor or other care provider to review them with you.            We Performed the Following     CBC & Differential     Comprehensive Metabolic Panel     Hemoglobin A1c     Vitamin D 25 Hydroxy       You Were Diagnosed With        Codes Comments    Uncontrolled type 2 diabetes mellitus with complication, with long-term current use of insulin    -  Primary ICD-10-CM: E11.8,  "E11.65, Z79.4  ICD-9-CM: 250.82, V58.67     Essential hypertension     ICD-10-CM: I10  ICD-9-CM: 401.9     Dyslipidemia     ICD-10-CM: E78.5  ICD-9-CM: 272.4     Vitamin D deficiency     ICD-10-CM: E55.9  ICD-9-CM: 268.9     Neurologic disorder associated with diabetes mellitus     ICD-10-CM: E11.49  ICD-9-CM: 250.60, 349.9       Instructions     None    Patient Instructions History      Upcoming Appointments     Visit Type Date Time Department    OFFICE VISIT 1/27/2017  9:45 AM [facility] FM RESIDENT [facility]    FOLLOW UP 1/27/2017 11:30 AM [facility] ENDOCRINOLOGY [facility]    WELLNESS 2/6/2017  3:00 PM [facility] RESIDENT [facility]    FOLLOW UP 4/26/2017  9:00 AM [facility] ENDOCRINOLOGY [facility]      MyChart Signup     Our records indicate that you have declined [facility] signup. If you would like to sign up for [facility], please email [email or web address] or call [phone] to obtain an activation code.             Other Info from Your Visit           Your Appointments     Feb 06, 2017  3:00 PM CST   Wellness with Yadira Delarosa MD   [facility] FAMILY MEDICINE (--)    [address] [place] [address] [phone]            Apr 26, 2017  9:00 AM CDT   Follow Up with BRIDGETT Gamez   [facility] ENDOCRINOLOGY (--)    [address]  [address] [place] [address] [phone]           Arrive 15 minutes prior to appointment.              Other Notes About Your Plan     Risk Score: 5  CRE positive, requires gloves only, when seen in clinic        Allergies     Other      Bandaids, MRSA, SURECLOSE      Reason for Visit     Diabetes rafael      Vital Signs     Blood Pressure Pulse Height Weight Body Mass Index Smoking Status    143/82 (BP Location: Left arm, Patient Position: Sitting, Cuff Size: Adult) 89 62\" (157.5 cm) 281 lb 8 oz (128 kg) 51.49 kg/m2 Current Every Day Smoker      Problems and Diagnoses Noted     Dyslipidemia    High blood pressure    " Neurologic disorder associated with diabetes mellitus    Uncontrolled type 2 diabetes mellitus with complication, with long-term current use of insulin    Vitamin D deficiency

## 2017-01-27 NOTE — MR AVS SNAPSHOT
Yamileth Slater   1/27/2017 9:45 AM   Office Visit    Dept Phone:  [phone]   Encounter #:  [id number]    Provider:  Yadira Delarosa MD   Department:  [facility] FAMILY MEDICINE                Your Full Care Plan              Today's Medication Changes          These changes are accurate as of: 1/27/17 11:46 AM.  If you have any questions, ask your nurse or doctor.               Medication(s)that have changed:     clobetasol 0.05 % lotion   Commonly known as:  CLOBEX   apply to skin bid x 1-2w then prn. generic   What changed:  how to take this   Changed by:  Yadira Delarosa MD       cyclobenzaprine 10 MG tablet   Commonly known as:  FLEXERIL   Take 1 tablet by mouth 2 (Two) Times a Day.   What changed:  See the new instructions.   Changed by:  Yadira Delarosa MD       furosemide 40 MG tablet   Commonly known as:  LASIX   Take 1 tablet by mouth Daily.   What changed:  See the new instructions.   Changed by:  Yadira Delarosa MD       gabapentin 800 MG tablet   Commonly known as:  NEURONTIN   Take 1 tablet by mouth 3 (Three) Times a Day.   What changed:  See the new instructions.   Changed by:  Yadira Delarosa MD       insulin glargine 100 UNIT/ML injection   Commonly known as:  LANTUS   Inject 90 Units under the skin Every 12 (Twelve) Hours.   What changed:  Another medication with the same name was removed. Continue taking this medication, and follow the directions you see here.   Changed by:  Yadira Delarosa MD       Insulin Pen Needle 30G X 8 MM misc   Commonly known as:  NOVOFINE   As directed 4 times daily   What changed:    - how to take this  - when to take this  - additional instructions   Changed by:  Yadira Delarosa MD         Stop taking medication(s)listed here:     fluconazole 100 MG tablet   Commonly known as:  DIFLUCAN   Stopped by:  Yadira Delarosa MD           fluconazole 150 MG tablet   Commonly known  as:  DIFLUCAN   Stopped by:  Yadira Delarosa MD                Where to Get Your Medications      These medications [facility] [place] [facility] [address] [facility] - [phone]  - [phone] FX  [address] [facility] [place] [address]     Phone:  [phone]     clobetasol 0.05 % lotion    cyclobenzaprine 10 MG tablet    furosemide 40 MG tablet    gabapentin 800 MG tablet    glucose blood test strip    insulin glargine 100 UNIT/ML injection    Insulin Lispro 100 UNIT/ML solution pen-injector    Insulin Pen Needle 30G X 8 MM misc    metoprolol tartrate 50 MG tablet    naproxen 500 MG tablet                  Your Updated Medication List          This list is accurate as of: 1/27/17 11:46 AM.  Always use your most recent med list.                atorvastatin 20 MG tablet   Commonly known as:  LIPITOR   Take 1 tablet by mouth Daily.       budesonide-formoterol 160-4.5 MCG/ACT inhaler   Commonly known as:  SYMBICORT   Inhale 2 puffs 2 (Two) Times a Day.       cetirizine 10 MG tablet   Commonly known as:  zyrTEC   Take 1 tablet by mouth Daily.       chlorhexidine 4 % external liquid   Commonly known as:  HIBICLENS   Apply  topically Daily.       clobetasol 0.05 % lotion   Commonly known as:  CLOBEX   apply to skin bid x 1-2w then prn. generic       clopidogrel 75 MG tablet   Commonly known as:  PLAVIX   Take 1 tablet by mouth Daily.       CVS BLOOD GLUCOSE METER W/DEVICE kit   1 each 3 (Three) Times a Day.       cyclobenzaprine 10 MG tablet   Commonly known as:  FLEXERIL   Take 1 tablet by mouth 2 (Two) Times a Day.       furosemide 40 MG tablet   Commonly known as:  LASIX   Take 1 tablet by mouth Daily.       gabapentin 800 MG tablet   Commonly known as:  NEURONTIN   Take 1 tablet by mouth 3 (Three) Times a Day.       glucose blood test strip   1 each by Other route 4 (Four) Times a Day. Use as instructed       insulin glargine 100 UNIT/ML injection   Commonly known as:  LANTUS   Inject 90 Units under  the skin Every 12 (Twelve) Hours.       Insulin Lispro 100 UNIT/ML solution pen-injector   Commonly known as:  HUMALOG KWIKPEN   Inject 45 Units under the skin 3 (Three) Times a Day Before Meals.       Insulin Pen Needle 30G X 8 MM misc   Commonly known as:  NOVOFINE   As directed 4 times daily       lisinopril 10 MG tablet   Commonly known as:  PRINIVIL,ZESTRIL   Take 1 tablet by mouth Daily.       * metFORMIN 1000 MG tablet   Commonly known as:  GLUCOPHAGE   Take 1 tablet by mouth 2 (Two) Times a Day With Meals.       * metFORMIN 1000 MG (MOD) 24 hr tablet   Commonly known as:  GLUMETZA       metoprolol tartrate 50 MG tablet   Commonly known as:  LOPRESSOR   Take 1 tablet by mouth 2 (Two) Times a Day.       naproxen 500 MG tablet   Commonly known as:  NAPROSYN   Take 1 tablet by mouth 2 (Two) Times a Day With Meals.       nitroglycerin 0.4 MG SL tablet   Commonly known as:  NITROSTAT   Place 1 tablet under the tongue As Needed for chest pain. Take no more than 3 doses in 15 minutes.       omeprazole-sodium bicarbonate  MG per capsule   Commonly known as:  ZEGERID       pantoprazole 40 MG EC tablet   Commonly known as:  PROTONIX   Take 1 tablet by mouth Daily.       permethrin 5 % cream   Commonly known as:  ELIMITE       potassium chloride 10 MEQ CR tablet   Commonly known as:  K-DUR   Take 1 tablet by mouth Every Night.       promethazine 25 MG tablet   Commonly known as:  PHENERGAN       * Notice:  This list has 2 medication(s) that are the same as other medications prescribed for you. Read the directions carefully, and ask your doctor or other care provider to review them with you.            You Were Diagnosed With        Codes Comments    Pain    -  Primary ICD-10-CM: R52  ICD-9-CM: 780.96     Diabetes mellitus type 2 in obese     ICD-10-CM: E11.9, E66.9  ICD-9-CM: 250.00, 278.00     Type 2 diabetes mellitus with other skin complication, with long-term current use of insulin     ICD-10-CM: E11.628,  "Z79.4       Instructions     None    Patient Instructions History      Upcoming Appointments     Visit Type Date Time Department    OFFICE VISIT 1/27/2017  9:45 AM [facility] FM RESIDENT [facility]    FOLLOW UP 1/27/2017 11:30 AM [facility] ENDOCRINOLOGY [facility]    WELLNESS 2/6/2017  3:00 PM [facility] FM RESIDENT [facility] Signup     Our records indicate that you have declined [facility] signup. If you would like to sign up for MyChart, please email [email or web address] or call [phone] to obtain an activation code.             Other Info from Your Visit           Your Appointments     Feb 06, 2017  3:00 PM CST   Wellness with Yadira Delarosa MD   [facility] FAMILY MEDICINE (--)    [address] [place] [address] [phone]              Other Notes About Your Plan     Risk Score: 5  CRE positive, requires gloves only, when seen in clinic        Allergies     Other      Bandaids, MRSA, SURECLOSE      Reason for Visit     Med Refill     Follow-up           Vital Signs     Blood Pressure Pulse Height Weight Oxygen Saturation Body Mass Index    121/80 (BP Location: Left arm, Patient Position: Sitting) 98 62\" (157.5 cm) 278 lb 14.4 oz (127 kg) 98% 51.01 kg/m2    Smoking Status                   Current Every Day Smoker           Problems and Diagnoses Noted     Diabetes    Diabetes mellitus type 2 in obese    Pain        "

## 2017-02-28 DIAGNOSIS — E11.69 DIABETES MELLITUS TYPE 2 IN OBESE (HCC): ICD-10-CM

## 2017-02-28 DIAGNOSIS — E66.9 DIABETES MELLITUS TYPE 2 IN OBESE (HCC): ICD-10-CM

## 2017-02-28 RX ORDER — METFORMIN HYDROCHLORIDE 1000 MG/1
TABLET, FILM COATED, EXTENDED RELEASE ORAL
Qty: 60 TABLET | Refills: 3 | Status: SHIPPED | OUTPATIENT
Start: 2017-02-28 | End: 2017-03-31 | Stop reason: SDUPTHER

## 2017-02-28 RX ORDER — POTASSIUM CHLORIDE 750 MG/1
TABLET, FILM COATED, EXTENDED RELEASE ORAL
Qty: 30 TABLET | Refills: 3 | Status: SHIPPED | OUTPATIENT
Start: 2017-02-28 | End: 2017-03-21 | Stop reason: HOSPADM

## 2017-02-28 RX ORDER — CLOPIDOGREL BISULFATE 75 MG/1
TABLET ORAL
Qty: 30 TABLET | Refills: 3 | Status: SHIPPED | OUTPATIENT
Start: 2017-02-28 | End: 2017-06-12 | Stop reason: SDUPTHER

## 2017-02-28 RX ORDER — PANTOPRAZOLE SODIUM 40 MG/1
TABLET, DELAYED RELEASE ORAL
Qty: 30 TABLET | Refills: 3 | Status: SHIPPED | OUTPATIENT
Start: 2017-02-28 | End: 2017-03-31 | Stop reason: SDUPTHER

## 2017-02-28 RX ORDER — ATORVASTATIN CALCIUM 20 MG/1
TABLET, FILM COATED ORAL
Qty: 30 TABLET | Refills: 3 | Status: SHIPPED | OUTPATIENT
Start: 2017-02-28 | End: 2017-03-31 | Stop reason: SDUPTHER

## 2017-02-28 RX ORDER — LISINOPRIL 10 MG/1
TABLET ORAL
Qty: 30 TABLET | Refills: 3 | Status: SHIPPED | OUTPATIENT
Start: 2017-02-28 | End: 2017-06-12 | Stop reason: SDUPTHER

## 2017-03-16 ENCOUNTER — HOSPITAL ENCOUNTER (INPATIENT)
Facility: HOSPITAL | Age: 57
LOS: 5 days | Discharge: HOME HEALTH | End: 2017-03-21
Attending: EMERGENCY MEDICINE | Admitting: FAMILY MEDICINE

## 2017-03-16 ENCOUNTER — APPOINTMENT (OUTPATIENT)
Dept: GENERAL RADIOLOGY | Facility: HOSPITAL | Age: 57
End: 2017-03-16

## 2017-03-16 DIAGNOSIS — J18.9 SEPSIS DUE TO PNEUMONIA (HCC): ICD-10-CM

## 2017-03-16 DIAGNOSIS — J18.9 PNEUMONIA OF BOTH LUNGS DUE TO INFECTIOUS ORGANISM, UNSPECIFIED PART OF LUNG: Primary | ICD-10-CM

## 2017-03-16 DIAGNOSIS — J45.30 MILD PERSISTENT ASTHMA WITHOUT COMPLICATION: ICD-10-CM

## 2017-03-16 DIAGNOSIS — J44.0 CHRONIC OBSTRUCTIVE PULMONARY DISEASE WITH ACUTE LOWER RESPIRATORY INFECTION (HCC): ICD-10-CM

## 2017-03-16 DIAGNOSIS — J15.212 PNEUMONIA OF BOTH LOWER LOBES DUE TO METHICILLIN RESISTANT STAPHYLOCOCCUS AUREUS (MRSA) (HCC): ICD-10-CM

## 2017-03-16 DIAGNOSIS — Z99.89 OSA ON CPAP: ICD-10-CM

## 2017-03-16 DIAGNOSIS — J96.01 ACUTE RESPIRATORY FAILURE WITH HYPOXIA (HCC): ICD-10-CM

## 2017-03-16 DIAGNOSIS — Z74.09 IMPAIRED PHYSICAL MOBILITY: ICD-10-CM

## 2017-03-16 DIAGNOSIS — G47.33 OSA ON CPAP: ICD-10-CM

## 2017-03-16 DIAGNOSIS — A41.9 SEPSIS DUE TO PNEUMONIA (HCC): ICD-10-CM

## 2017-03-16 PROBLEM — Z29.89 PROPHYLACTIC HOSPITAL ISOLATION: Status: ACTIVE | Noted: 2017-03-16

## 2017-03-16 PROBLEM — Z41.8 PROPHYLACTIC HOSPITAL ISOLATION: Status: ACTIVE | Noted: 2017-03-16

## 2017-03-16 LAB
ALBUMIN SERPL-MCNC: 3.4 G/DL (ref 3.4–4.8)
ALBUMIN/GLOB SERPL: 0.9 G/DL (ref 1.1–1.8)
ALP SERPL-CCNC: 190 U/L (ref 38–126)
ALT SERPL W P-5'-P-CCNC: 24 U/L (ref 9–52)
ANION GAP SERPL CALCULATED.3IONS-SCNC: 13 MMOL/L (ref 5–15)
APTT PPP: 29.7 SECONDS (ref 20–40.3)
AST SERPL-CCNC: 30 U/L (ref 14–36)
BASOPHILS # BLD AUTO: 0.03 10*3/MM3 (ref 0–0.2)
BASOPHILS NFR BLD AUTO: 0.3 % (ref 0–2)
BILIRUB SERPL-MCNC: 0.5 MG/DL (ref 0.2–1.3)
BUN BLD-MCNC: 22 MG/DL (ref 7–21)
BUN/CREAT SERPL: 25.9 (ref 7–25)
CALCIUM SPEC-SCNC: 8.6 MG/DL (ref 8.4–10.2)
CHLORIDE SERPL-SCNC: 99 MMOL/L (ref 95–110)
CO2 SERPL-SCNC: 29 MMOL/L (ref 22–31)
CREAT BLD-MCNC: 0.85 MG/DL (ref 0.5–1)
CRP SERPL-MCNC: 28.3 MG/DL (ref 0–1)
D-LACTATE SERPL-SCNC: 2 MMOL/L (ref 0.5–2)
D-LACTATE SERPL-SCNC: 3.1 MMOL/L (ref 0.5–2)
DEPRECATED RDW RBC AUTO: 48.7 FL (ref 36.4–46.3)
EOSINOPHIL # BLD AUTO: 0.33 10*3/MM3 (ref 0–0.7)
EOSINOPHIL NFR BLD AUTO: 3.5 % (ref 0–7)
ERYTHROCYTE [DISTWIDTH] IN BLOOD BY AUTOMATED COUNT: 17.4 % (ref 11.5–14.5)
FLUAV AG NPH QL: NEGATIVE
FLUBV AG NPH QL IA: NEGATIVE
GFR SERPL CREATININE-BSD FRML MDRD: 69 ML/MIN/1.73 (ref 51–120)
GLOBULIN UR ELPH-MCNC: 3.6 GM/DL (ref 2.3–3.5)
GLUCOSE BLD-MCNC: 217 MG/DL (ref 60–100)
GLUCOSE BLDC GLUCOMTR-MCNC: 346 MG/DL (ref 70–130)
HCT VFR BLD AUTO: 28.3 % (ref 35–45)
HGB BLD-MCNC: 8.8 G/DL (ref 12–15.5)
IMM GRANULOCYTES # BLD: 0.08 10*3/MM3 (ref 0–0.02)
IMM GRANULOCYTES NFR BLD: 0.8 % (ref 0–0.5)
INR PPP: 1.05 (ref 0.8–1.2)
L PNEUMO1 AG UR QL IA: NEGATIVE
LYMPHOCYTES # BLD AUTO: 2.65 10*3/MM3 (ref 0.6–4.2)
LYMPHOCYTES NFR BLD AUTO: 27.7 % (ref 10–50)
MCH RBC QN AUTO: 23.5 PG (ref 26.5–34)
MCHC RBC AUTO-ENTMCNC: 31.1 G/DL (ref 31.4–36)
MCV RBC AUTO: 75.7 FL (ref 80–98)
MONOCYTES # BLD AUTO: 0.64 10*3/MM3 (ref 0–0.9)
MONOCYTES NFR BLD AUTO: 6.7 % (ref 0–12)
NEUTROPHILS # BLD AUTO: 5.83 10*3/MM3 (ref 2–8.6)
NEUTROPHILS NFR BLD AUTO: 61 % (ref 37–80)
NT-PROBNP SERPL-MCNC: 928 PG/ML (ref 0–900)
PLATELET # BLD AUTO: 249 10*3/MM3 (ref 150–450)
PMV BLD AUTO: 9.1 FL (ref 8–12)
POTASSIUM BLD-SCNC: 4 MMOL/L (ref 3.5–5.1)
PROT SERPL-MCNC: 7 G/DL (ref 6.3–8.6)
PROTHROMBIN TIME: 13.7 SECONDS (ref 11.1–15.3)
RBC # BLD AUTO: 3.74 10*6/MM3 (ref 3.77–5.16)
S PNEUM AG SPEC QL LA: NEGATIVE
SODIUM BLD-SCNC: 141 MMOL/L (ref 137–145)
TROPONIN I SERPL-MCNC: 0.03 NG/ML
WBC NRBC COR # BLD: 9.56 10*3/MM3 (ref 3.2–9.8)

## 2017-03-16 PROCEDURE — 94660 CPAP INITIATION&MGMT: CPT

## 2017-03-16 PROCEDURE — 80053 COMPREHEN METABOLIC PANEL: CPT | Performed by: EMERGENCY MEDICINE

## 2017-03-16 PROCEDURE — 83880 ASSAY OF NATRIURETIC PEPTIDE: CPT | Performed by: EMERGENCY MEDICINE

## 2017-03-16 PROCEDURE — 86140 C-REACTIVE PROTEIN: CPT | Performed by: FAMILY MEDICINE

## 2017-03-16 PROCEDURE — 25010000002 HEPARIN (PORCINE) PER 1000 UNITS: Performed by: FAMILY MEDICINE

## 2017-03-16 PROCEDURE — 25010000002 CEFTRIAXONE: Performed by: EMERGENCY MEDICINE

## 2017-03-16 PROCEDURE — 25010000002 METHYLPREDNISOLONE PER 125 MG: Performed by: EMERGENCY MEDICINE

## 2017-03-16 PROCEDURE — 87899 AGENT NOS ASSAY W/OPTIC: CPT | Performed by: FAMILY MEDICINE

## 2017-03-16 PROCEDURE — 93005 ELECTROCARDIOGRAM TRACING: CPT | Performed by: EMERGENCY MEDICINE

## 2017-03-16 PROCEDURE — 63710000001 INSULIN DETEMIR PER 5 UNITS: Performed by: FAMILY MEDICINE

## 2017-03-16 PROCEDURE — 84484 ASSAY OF TROPONIN QUANT: CPT | Performed by: EMERGENCY MEDICINE

## 2017-03-16 PROCEDURE — 85025 COMPLETE CBC W/AUTO DIFF WBC: CPT | Performed by: EMERGENCY MEDICINE

## 2017-03-16 PROCEDURE — 87804 INFLUENZA ASSAY W/OPTIC: CPT | Performed by: EMERGENCY MEDICINE

## 2017-03-16 PROCEDURE — 99222 1ST HOSP IP/OBS MODERATE 55: CPT | Performed by: FAMILY MEDICINE

## 2017-03-16 PROCEDURE — 87040 BLOOD CULTURE FOR BACTERIA: CPT | Performed by: FAMILY MEDICINE

## 2017-03-16 PROCEDURE — 71020 HC CHEST PA AND LATERAL: CPT

## 2017-03-16 PROCEDURE — 94640 AIRWAY INHALATION TREATMENT: CPT

## 2017-03-16 PROCEDURE — 94799 UNLISTED PULMONARY SVC/PX: CPT

## 2017-03-16 PROCEDURE — 83605 ASSAY OF LACTIC ACID: CPT | Performed by: FAMILY MEDICINE

## 2017-03-16 PROCEDURE — 85610 PROTHROMBIN TIME: CPT | Performed by: EMERGENCY MEDICINE

## 2017-03-16 PROCEDURE — 85730 THROMBOPLASTIN TIME PARTIAL: CPT | Performed by: EMERGENCY MEDICINE

## 2017-03-16 PROCEDURE — 82962 GLUCOSE BLOOD TEST: CPT

## 2017-03-16 PROCEDURE — 63710000001 INSULIN ASPART PER 5 UNITS: Performed by: FAMILY MEDICINE

## 2017-03-16 PROCEDURE — 93010 ELECTROCARDIOGRAM REPORT: CPT | Performed by: INTERNAL MEDICINE

## 2017-03-16 PROCEDURE — 25010000002 AZITHROMYCIN: Performed by: EMERGENCY MEDICINE

## 2017-03-16 PROCEDURE — 87449 NOS EACH ORGANISM AG IA: CPT | Performed by: FAMILY MEDICINE

## 2017-03-16 PROCEDURE — 25010000002 METHYLPREDNISOLONE PER 125 MG: Performed by: FAMILY MEDICINE

## 2017-03-16 PROCEDURE — 87086 URINE CULTURE/COLONY COUNT: CPT | Performed by: FAMILY MEDICINE

## 2017-03-16 PROCEDURE — 99284 EMERGENCY DEPT VISIT MOD MDM: CPT

## 2017-03-16 RX ORDER — HEPARIN SODIUM 5000 [USP'U]/ML
5000 INJECTION, SOLUTION INTRAVENOUS; SUBCUTANEOUS EVERY 12 HOURS SCHEDULED
Status: DISCONTINUED | OUTPATIENT
Start: 2017-03-16 | End: 2017-03-17

## 2017-03-16 RX ORDER — METOPROLOL TARTRATE 50 MG/1
50 TABLET, FILM COATED ORAL 2 TIMES DAILY
Status: DISCONTINUED | OUTPATIENT
Start: 2017-03-16 | End: 2017-03-21 | Stop reason: HOSPADM

## 2017-03-16 RX ORDER — CLOPIDOGREL BISULFATE 75 MG/1
75 TABLET ORAL DAILY
Status: DISCONTINUED | OUTPATIENT
Start: 2017-03-16 | End: 2017-03-21 | Stop reason: HOSPADM

## 2017-03-16 RX ORDER — POTASSIUM CHLORIDE 750 MG/1
10 TABLET, FILM COATED, EXTENDED RELEASE ORAL DAILY
Status: DISCONTINUED | OUTPATIENT
Start: 2017-03-16 | End: 2017-03-21

## 2017-03-16 RX ORDER — CETIRIZINE HYDROCHLORIDE 10 MG/1
10 TABLET ORAL DAILY
Status: DISCONTINUED | OUTPATIENT
Start: 2017-03-16 | End: 2017-03-21 | Stop reason: HOSPADM

## 2017-03-16 RX ORDER — NICOTINE POLACRILEX 4 MG
15 LOZENGE BUCCAL
Status: DISCONTINUED | OUTPATIENT
Start: 2017-03-16 | End: 2017-03-21 | Stop reason: HOSPADM

## 2017-03-16 RX ORDER — PROMETHAZINE HYDROCHLORIDE 25 MG/1
25 TABLET ORAL EVERY 6 HOURS PRN
Status: DISCONTINUED | OUTPATIENT
Start: 2017-03-16 | End: 2017-03-21 | Stop reason: HOSPADM

## 2017-03-16 RX ORDER — IPRATROPIUM BROMIDE AND ALBUTEROL SULFATE 2.5; .5 MG/3ML; MG/3ML
3 SOLUTION RESPIRATORY (INHALATION)
Status: DISCONTINUED | OUTPATIENT
Start: 2017-03-16 | End: 2017-03-21 | Stop reason: HOSPADM

## 2017-03-16 RX ORDER — METHYLPREDNISOLONE SODIUM SUCCINATE 125 MG/2ML
125 INJECTION, POWDER, LYOPHILIZED, FOR SOLUTION INTRAMUSCULAR; INTRAVENOUS ONCE
Status: COMPLETED | OUTPATIENT
Start: 2017-03-16 | End: 2017-03-16

## 2017-03-16 RX ORDER — NITROGLYCERIN 0.4 MG/1
0.4 TABLET SUBLINGUAL AS NEEDED
Status: DISCONTINUED | OUTPATIENT
Start: 2017-03-16 | End: 2017-03-21 | Stop reason: HOSPADM

## 2017-03-16 RX ORDER — LISINOPRIL 10 MG/1
10 TABLET ORAL
Status: DISCONTINUED | OUTPATIENT
Start: 2017-03-16 | End: 2017-03-21 | Stop reason: HOSPADM

## 2017-03-16 RX ORDER — CYCLOBENZAPRINE HCL 10 MG
10 TABLET ORAL 2 TIMES DAILY
Status: DISCONTINUED | OUTPATIENT
Start: 2017-03-16 | End: 2017-03-21 | Stop reason: HOSPADM

## 2017-03-16 RX ORDER — ATORVASTATIN CALCIUM 20 MG/1
20 TABLET, FILM COATED ORAL DAILY
Status: DISCONTINUED | OUTPATIENT
Start: 2017-03-16 | End: 2017-03-21 | Stop reason: HOSPADM

## 2017-03-16 RX ORDER — FUROSEMIDE 10 MG/ML
40 INJECTION INTRAMUSCULAR; INTRAVENOUS DAILY
Status: DISCONTINUED | OUTPATIENT
Start: 2017-03-17 | End: 2017-03-19

## 2017-03-16 RX ORDER — BUDESONIDE AND FORMOTEROL FUMARATE DIHYDRATE 160; 4.5 UG/1; UG/1
2 AEROSOL RESPIRATORY (INHALATION)
Status: DISCONTINUED | OUTPATIENT
Start: 2017-03-16 | End: 2017-03-21 | Stop reason: HOSPADM

## 2017-03-16 RX ORDER — GABAPENTIN 400 MG/1
800 CAPSULE ORAL EVERY 8 HOURS SCHEDULED
Status: DISCONTINUED | OUTPATIENT
Start: 2017-03-16 | End: 2017-03-21 | Stop reason: HOSPADM

## 2017-03-16 RX ORDER — BISACODYL 10 MG
10 SUPPOSITORY, RECTAL RECTAL DAILY PRN
Status: DISCONTINUED | OUTPATIENT
Start: 2017-03-16 | End: 2017-03-21 | Stop reason: HOSPADM

## 2017-03-16 RX ORDER — ONDANSETRON 2 MG/ML
4 INJECTION INTRAMUSCULAR; INTRAVENOUS EVERY 6 HOURS PRN
Status: DISCONTINUED | OUTPATIENT
Start: 2017-03-16 | End: 2017-03-21 | Stop reason: HOSPADM

## 2017-03-16 RX ORDER — METHYLPREDNISOLONE SODIUM SUCCINATE 125 MG/2ML
60 INJECTION, POWDER, LYOPHILIZED, FOR SOLUTION INTRAMUSCULAR; INTRAVENOUS EVERY 12 HOURS
Status: DISCONTINUED | OUTPATIENT
Start: 2017-03-16 | End: 2017-03-18

## 2017-03-16 RX ORDER — FUROSEMIDE 10 MG/ML
80 INJECTION INTRAMUSCULAR; INTRAVENOUS ONCE
Status: DISCONTINUED | OUTPATIENT
Start: 2017-03-16 | End: 2017-03-19

## 2017-03-16 RX ORDER — SODIUM CHLORIDE 0.9 % (FLUSH) 0.9 %
1-10 SYRINGE (ML) INJECTION AS NEEDED
Status: DISCONTINUED | OUTPATIENT
Start: 2017-03-16 | End: 2017-03-21 | Stop reason: HOSPADM

## 2017-03-16 RX ORDER — DEXTROSE MONOHYDRATE 25 G/50ML
25 INJECTION, SOLUTION INTRAVENOUS
Status: DISCONTINUED | OUTPATIENT
Start: 2017-03-16 | End: 2017-03-21 | Stop reason: HOSPADM

## 2017-03-16 RX ORDER — PANTOPRAZOLE SODIUM 40 MG/10ML
40 INJECTION, POWDER, LYOPHILIZED, FOR SOLUTION INTRAVENOUS
Status: DISCONTINUED | OUTPATIENT
Start: 2017-03-17 | End: 2017-03-21 | Stop reason: HOSPADM

## 2017-03-16 RX ORDER — IPRATROPIUM BROMIDE AND ALBUTEROL SULFATE 2.5; .5 MG/3ML; MG/3ML
3 SOLUTION RESPIRATORY (INHALATION) ONCE
Status: COMPLETED | OUTPATIENT
Start: 2017-03-16 | End: 2017-03-16

## 2017-03-16 RX ORDER — SODIUM CHLORIDE 0.9 % (FLUSH) 0.9 %
10 SYRINGE (ML) INJECTION AS NEEDED
Status: DISCONTINUED | OUTPATIENT
Start: 2017-03-16 | End: 2017-03-17 | Stop reason: SDUPTHER

## 2017-03-16 RX ORDER — NICOTINE 21 MG/24HR
1 PATCH, TRANSDERMAL 24 HOURS TRANSDERMAL EVERY 24 HOURS
Status: DISCONTINUED | OUTPATIENT
Start: 2017-03-16 | End: 2017-03-21 | Stop reason: HOSPADM

## 2017-03-16 RX ORDER — ACETAMINOPHEN 325 MG/1
650 TABLET ORAL EVERY 4 HOURS PRN
Status: DISCONTINUED | OUTPATIENT
Start: 2017-03-16 | End: 2017-03-21 | Stop reason: HOSPADM

## 2017-03-16 RX ADMIN — Medication 10 ML: at 22:16

## 2017-03-16 RX ADMIN — AZITHROMYCIN 500 MG: 500 INJECTION, POWDER, LYOPHILIZED, FOR SOLUTION INTRAVENOUS at 13:15

## 2017-03-16 RX ADMIN — CEFTRIAXONE 1 G: 1 INJECTION, POWDER, FOR SOLUTION INTRAMUSCULAR; INTRAVENOUS at 15:08

## 2017-03-16 RX ADMIN — METOPROLOL TARTRATE 50 MG: 50 TABLET ORAL at 21:57

## 2017-03-16 RX ADMIN — CETIRIZINE HYDROCHLORIDE 10 MG: 10 TABLET, FILM COATED ORAL at 21:55

## 2017-03-16 RX ADMIN — CLOPIDOGREL BISULFATE 75 MG: 75 TABLET ORAL at 21:54

## 2017-03-16 RX ADMIN — METHYLPREDNISOLONE SODIUM SUCCINATE 60 MG: 125 INJECTION, POWDER, FOR SOLUTION INTRAMUSCULAR; INTRAVENOUS at 21:57

## 2017-03-16 RX ADMIN — IPRATROPIUM BROMIDE AND ALBUTEROL SULFATE 3 ML: 2.5; .5 SOLUTION RESPIRATORY (INHALATION) at 13:14

## 2017-03-16 RX ADMIN — HEPARIN SODIUM 5000 UNITS: 5000 INJECTION, SOLUTION INTRAVENOUS; SUBCUTANEOUS at 22:09

## 2017-03-16 RX ADMIN — NICOTINE 1 PATCH: 14 PATCH, EXTENDED RELEASE TRANSDERMAL at 21:58

## 2017-03-16 RX ADMIN — INSULIN DETEMIR 90 UNITS: 100 INJECTION, SOLUTION SUBCUTANEOUS at 22:10

## 2017-03-16 RX ADMIN — INSULIN ASPART 16 UNITS: 100 INJECTION, SOLUTION INTRAVENOUS; SUBCUTANEOUS at 22:08

## 2017-03-16 RX ADMIN — POTASSIUM CHLORIDE 10 MEQ: 750 TABLET, FILM COATED, EXTENDED RELEASE ORAL at 22:16

## 2017-03-16 RX ADMIN — ATORVASTATIN CALCIUM 20 MG: 40 TABLET, FILM COATED ORAL at 21:53

## 2017-03-16 RX ADMIN — CYCLOBENZAPRINE HYDROCHLORIDE 10 MG: 10 TABLET, FILM COATED ORAL at 21:55

## 2017-03-16 RX ADMIN — IPRATROPIUM BROMIDE AND ALBUTEROL SULFATE 3 ML: 2.5; .5 SOLUTION RESPIRATORY (INHALATION) at 20:36

## 2017-03-16 RX ADMIN — METHYLPREDNISOLONE SODIUM SUCCINATE 125 MG: 125 INJECTION, POWDER, FOR SOLUTION INTRAMUSCULAR; INTRAVENOUS at 13:14

## 2017-03-16 RX ADMIN — GABAPENTIN 800 MG: 400 CAPSULE ORAL at 21:56

## 2017-03-16 RX ADMIN — LISINOPRIL 10 MG: 10 TABLET ORAL at 21:56

## 2017-03-16 NOTE — ED NOTES
Pt ambulated by self with nurse 50ft on room air and maintained O2 sats of 90%     Keena Jones RN  03/16/17 1969

## 2017-03-16 NOTE — ED PROVIDER NOTES
Subjective   HPI Comments: Patient with about one week of respiratory problems.  Patient is currently borrowing a friends home O2 as it gives her some releif.  Patient notes no abx use or other interventions.  Has not seen her primary.  Is using her home COPD treatments without improvement.  Has not had fever.  Has been more weak with decreased exercise tolerance.      Patient is a 57 y.o. female presenting with shortness of breath.   Shortness of Breath   Severity:  Moderate  Onset quality:  Gradual  Duration:  1 week  Timing:  Constant  Progression:  Worsening  Chronicity:  New  Context: URI    Relieved by:  Oxygen  Worsened by:  Activity, exertion and movement  Ineffective treatments:  Oxygen  Associated symptoms: cough, diaphoresis, fever, sore throat and wheezing    Associated symptoms: no abdominal pain, no chest pain, no headaches, no neck pain and no vomiting    Risk factors comment:  COPD      Review of Systems   Constitutional: Positive for activity change, diaphoresis, fatigue and fever. Negative for appetite change and chills.   HENT: Positive for sore throat. Negative for congestion.    Eyes: Negative.  Negative for photophobia and visual disturbance.   Respiratory: Positive for cough, shortness of breath and wheezing. Negative for chest tightness.    Cardiovascular: Negative.  Negative for chest pain and palpitations.   Gastrointestinal: Negative.  Negative for abdominal pain, constipation, diarrhea, nausea and vomiting.   Endocrine: Negative.    Genitourinary: Negative.  Negative for decreased urine volume, dysuria, flank pain and hematuria.   Musculoskeletal: Positive for myalgias. Negative for arthralgias, back pain, neck pain and neck stiffness.   Skin: Negative.  Negative for pallor.   Neurological: Positive for dizziness and weakness. Negative for syncope, light-headedness, numbness and headaches.   Psychiatric/Behavioral: Negative.  Negative for confusion and suicidal ideas. The patient is not  nervous/anxious.    All other systems reviewed and are negative.      Past Medical History   Diagnosis Date   • Anxiety states    • Asthma    • Candidiasis of mouth    • Candidiasis of vulva and vagina    • Carotid artery stenosis      DWIGHT 0-15%, LICA 0-15%. LICA stent 5/2014.   • Chest pain    • Chronic folliculitis    • Chronic obstructive lung disease    • Coronary arteriosclerosis    • Diabetes mellitus      no retinopathy; A1C 9.1      • Dyslipidemia    • Dysphagia    • Encounter for general adult medical examination with abnormal findings    • Essential hypertension    • Excoriated eczema      asteosis/keratosis   • Furuncle of neck    • GERD (gastroesophageal reflux disease)    • History of colon polyps    • Hypertensive disorder    • Infection of skin and subcutaneous tissue      rt perineal abscess   • Infestation by Sarcoptes scabiei deepak hominis    • Kidney stone    • Leukocytosis    • Lower limb anomaly      Abscess of lower limb - ANTX causing n/v      • Mixed hyperlipidemia    • Morbid obesity due to excess calories      BMI 44.5   • Need for vaccination    • Neurologic disorder associated with diabetes mellitus    • Non-healing surgical wound      LLE EVHa   • Pain      LLE   • Peripheral vascular disease    • Rash    • Shoulder joint pain    • Sleep apnea    • Surgical follow-up care      5/27/14 L carotid stent   • Tobacco dependence syndrome      1/2ppd      • Type 2 diabetes mellitus    • Viral wart      RIGHT middle phalanx   • Vitamin D deficiency        Allergies   Allergen Reactions   • Clindamycin/Lincomycin    • Other      Bandaids, MRSA, SURECLOSE       Past Surgical History   Procedure Laterality Date   • Coronary artery bypass graft  11/15/2013     CABG x 3 with LIMA to LAD and coronary endarterectomy to LAD, SVG to Ramus intermedius branch and SVG to PDA.    • Cardiac catheterization  08/09/2013     Severe multivessel CAD with critical lesions noted in the RCA, obtuse marginal two, ramus  intermemdious, and LAD as described above. Normal LV systolic function with no wall motion abnormality.    • Cardiac catheterization  05/27/2014      LEFT Carotid Stent    • Colonoscopy  05/02/2016     Normal colon.Diverticulosis in the sigmoid colon.No specimens collected.    • Other surgical history  01/25/2016     DESTRUCTION OF BENIGN LESION      • Endoscopy  09/23/2015      Esophageal stricture present.Normal stomach.Normal duodenum.    • Endoscopy  11/16/2015     w/ dilatation - Esophageal stricture present,Dilatation performed.Normal stomach and duodenum.    • Other surgical history  04/18/2014     ear/neck - L attempted CEA, Neck Dissection    • Incision and drainage abscess  01/02/2016     Incision and drainage of right perineal abscess.    • Incision and drainage abscess  02/18/2014     Incision and Drainage of abscess of left lower leg        Family History   Problem Relation Age of Onset   • Coronary artery disease Other    • Diabetes Other    • Heart disease Other    • Hypertension Other        Social History     Social History   • Marital status:      Spouse name: N/A   • Number of children: N/A   • Years of education: N/A     Social History Main Topics   • Smoking status: Current Every Day Smoker     Types: Cigarettes   • Smokeless tobacco: None   • Alcohol use No   • Drug use: No   • Sexual activity: Not Asked     Other Topics Concern   • None     Social History Narrative           Objective   Physical Exam   Constitutional: She is oriented to person, place, and time. She appears well-developed and well-nourished. No distress.   HENT:   Head: Normocephalic and atraumatic.   Nose: Nose normal.   Mouth/Throat: Oropharynx is clear and moist.   Eyes: Conjunctivae and EOM are normal. No scleral icterus.   Neck: Normal range of motion. Neck supple. No JVD present.   Cardiovascular: Normal rate, regular rhythm, normal heart sounds and intact distal pulses.  Exam reveals no gallop and no friction rub.     No murmur heard.  Pulmonary/Chest: Effort normal. No respiratory distress. She has wheezes (moderate expiratory). She has no rales. She exhibits no tenderness.   Abdominal: Soft. She exhibits no distension and no mass. There is no tenderness. There is no rebound and no guarding.   obese   Musculoskeletal: Normal range of motion. She exhibits edema (1+ nonpitting). She exhibits no tenderness or deformity.   Lymphadenopathy:     She has no cervical adenopathy.   Neurological: She is alert and oriented to person, place, and time. No cranial nerve deficit. She exhibits normal muscle tone.   Skin: Skin is warm and dry. No rash noted. She is not diaphoretic. No erythema. No pallor.   Psychiatric: She has a normal mood and affect. Her behavior is normal. Judgment and thought content normal.   Nursing note and vitals reviewed.      Procedures         ED Course  ED Course          Labs Reviewed   COMPREHENSIVE METABOLIC PANEL - Abnormal; Notable for the following:        Result Value    Glucose 217 (*)     BUN 22 (*)     Alkaline Phosphatase 190 (*)     Globulin 3.6 (*)     A/G Ratio 0.9 (*)     BUN/Creatinine Ratio 25.9 (*)     All other components within normal limits   BNP (IN-HOUSE) - Abnormal; Notable for the following:     proBNP 928.0 (*)     All other components within normal limits   CBC WITH AUTO DIFFERENTIAL - Abnormal; Notable for the following:     RBC 3.74 (*)     Hemoglobin 8.8 (*)     Hematocrit 28.3 (*)     MCV 75.7 (*)     MCH 23.5 (*)     MCHC 31.1 (*)     RDW 17.4 (*)     RDW-SD 48.7 (*)     Immature Grans % 0.8 (*)     Immature Grans, Absolute 0.08 (*)     All other components within normal limits   INFLUENZA ANTIGEN - Normal   PROTIME-INR - Normal    Narrative:     Therapeutic range for most indications is 2.0-3.0 INR,  or 2.5-3.5 for mechanical heart valves.   APTT - Normal    Narrative:     The recommended Heparin therapeutic range is 68-97 seconds.   TROPONIN (IN-HOUSE) - Normal   CBC AND  DIFFERENTIAL    Narrative:     The following orders were created for panel order CBC & Differential.  Procedure                               Abnormality         Status                     ---------                               -----------         ------                     CBC Auto Differential[[id number]]         Abnormal            Final result                 Please view results for these tests on the individual orders.       XR Chest 2 View   Final Result   CONCLUSION:     Lung base opacities, right worse than left, suspicious for   pneumonia or pulmonary edema. Possible small right pleural   effusion.      Electronically signed by:  Umesh Parra MD  3/16/2017 11:36 AM   CDT Workstation: [id number]        Pneumonia with desaturations to <90% on ambulation.  Bilateral pneumonia.  Inpatient management.          [facility]    Final diagnoses:   Pneumonia of both lungs due to infectious organism, unspecified part of lung   Acute respiratory failure with hypoxia            Abel Bryan MD  03/16/17 [id number]

## 2017-03-16 NOTE — H&P
FAMILY MEDICINE HISTORY AND PHYSICAL  NAME: Yamileth Slater  : 1960  MRN: [id number]    DATE OF ADMISSION: 17    DATE & TIME SEEN: 17 7:11 PM    PCP: Yadira Delarosa MD    CHIEF COMPLAINT Shortness of breath    HPI:  Yamileth Slater is a 57 y.o. female who presents with shortness of breath, cough, fever of 103.0*F yesterday reportedly. Fatigue started about 4 days ago with dry cough, headache, wheezing, and sore throat. Coughing has increased since symptom onset. Patient got her neighbor's oxygen yesterday to see if she could get some symptom relief and states that it did help. She had it at 3L per minute through nasal cannula. Monday she first experienced fever and took her temperature and found it to be 101.6*F and been ranging between the 102's to 103 degrees fahrenheit since. Patient has had difficulty sleeping with the chronic coughing but reports that she uses a CPAP machine at night. Patient's  today decided that his wife needed to be checked out and brought her to the emergency department for further evaluation. She has received Solumedrol and a dose of Azithromycin and Ceftriaxone. CXR showed signs of bilateral pneumonia and a possible small right pleural effusion. She is currently in bed saturating at 97% with 3 L oxygen through nasal cannula. She states that she feels better since arrival in the ER, and with oxygen and medication administration.    CONCURRENT MEDICAL HISTORY:  Past Medical History   Diagnosis Date   • Anxiety states    • Asthma    • Candidiasis of mouth    • Candidiasis of vulva and vagina    • Carotid artery stenosis      DWIGHT 0-15%, LICA 0-15%. LICA stent 2014.   • Chest pain    • Chronic folliculitis    • Chronic obstructive lung disease    • Coronary arteriosclerosis    • Diabetes mellitus      no retinopathy; A1C 9.1      • Dyslipidemia    • Dysphagia    • Encounter for general adult medical examination with abnormal findings    •  Essential hypertension    • Excoriated eczema      asteosis/keratosis   • Furuncle of neck    • GERD (gastroesophageal reflux disease)    • History of colon polyps    • Hypertensive disorder    • Infection of skin and subcutaneous tissue      rt perineal abscess   • Infestation by Sarcoptes scabiei deepak hominis    • Kidney stone    • Leukocytosis    • Lower limb anomaly      Abscess of lower limb - ANTX causing n/v      • Mixed hyperlipidemia    • Morbid obesity due to excess calories      BMI 44.5   • Need for vaccination    • Neurologic disorder associated with diabetes mellitus    • Non-healing surgical wound      LLE EVHa   • Pain      LLE   • Peripheral vascular disease    • Rash    • Shoulder joint pain    • Sleep apnea    • Surgical follow-up care      5/27/14 L carotid stent   • Tobacco dependence syndrome      1/2ppd      • Type 2 diabetes mellitus    • Viral wart      RIGHT middle phalanx   • Vitamin D deficiency        PAST SURGICAL HISTORY:  Past Surgical History   Procedure Laterality Date   • Coronary artery bypass graft  11/15/2013     CABG x 3 with LIMA to LAD and coronary endarterectomy to LAD, SVG to Ramus intermedius branch and SVG to PDA.    • Cardiac catheterization  08/09/2013     Severe multivessel CAD with critical lesions noted in the RCA, obtuse marginal two, ramus intermemdious, and LAD as described above. Normal LV systolic function with no wall motion abnormality.    • Cardiac catheterization  05/27/2014      LEFT Carotid Stent    • Colonoscopy  05/02/2016     Normal colon.Diverticulosis in the sigmoid colon.No specimens collected.    • Other surgical history  01/25/2016     DESTRUCTION OF BENIGN LESION      • Endoscopy  09/23/2015      Esophageal stricture present.Normal stomach.Normal duodenum.    • Endoscopy  11/16/2015     w/ dilatation - Esophageal stricture present,Dilatation performed.Normal stomach and duodenum.    • Other surgical history  04/18/2014     ear/neck - L attempted  CEA, Neck Dissection    • Incision and drainage abscess  01/02/2016     Incision and drainage of right perineal abscess.    • Incision and drainage abscess  02/18/2014     Incision and Drainage of abscess of left lower leg        FAMILY HISTORY:  Family History   Problem Relation Age of Onset   • Coronary artery disease Other    • Diabetes Other    • Heart disease Other    • Hypertension Other         SOCIAL HISTORY:  Social History     Social History   • Marital status:      Spouse name: N/A   • Number of children: N/A   • Years of education: N/A     Occupational History   • Not on file.     Social History Main Topics   • Smoking status: Current Every Day Smoker     Types: Cigarettes   • Smokeless tobacco: Not on file   • Alcohol use No   • Drug use: No   • Sexual activity: Not on file     Other Topics Concern   • Not on file     Social History Narrative       MEDICATIONS:    (Not in a hospital admission)    Current Facility-Administered Medications:   •  Insert peripheral IV, , , Once **AND** sodium chloride 0.9 % flush 10 mL, 10 mL, Intravenous, PRN, Abel Bryan MD    Current Outpatient Prescriptions:   •  atorvastatin (LIPITOR) 20 MG tablet, TAKE 1 TABLET BY MOUTH DAILY., Disp: 30 tablet, Rfl: 3  •  Blood Glucose Monitoring Suppl ([employer] BLOOD GLUCOSE METER) W/DEVICE kit, 1 each 3 (Three) Times a Day., Disp: 1 kit, Rfl: 3  •  budesonide-formoterol (SYMBICORT) 160-4.5 MCG/ACT inhaler, Inhale 2 puffs 2 (Two) Times a Day., Disp: 1 inhaler, Rfl: 6  •  cetirizine (zyrTEC) 10 MG tablet, Take 1 tablet by mouth Daily., Disp: 30 tablet, Rfl: 3  •  chlorhexidine (HIBICLENS) 4 % external liquid, Apply  topically Daily., Disp: 118 mL, Rfl: 0  •  clobetasol (CLOBEX) 0.05 % lotion, apply to skin bid x 1-2w then prn. generic, Disp: 118 g, Rfl: 3  •  clopidogrel (PLAVIX) 75 MG tablet, TAKE 1 TABLET BY MOUTH DAILY., Disp: 30 tablet, Rfl: 3  •  cyclobenzaprine (FLEXERIL) 10 MG tablet, Take 1 tablet by mouth 2 (Two) Times  a Day., Disp: 60 tablet, Rfl: 2  •  furosemide (LASIX) 40 MG tablet, Take 1 tablet by mouth Daily., Disp: 30 tablet, Rfl: 3  •  gabapentin (NEURONTIN) 800 MG tablet, Take 1 tablet by mouth 3 (Three) Times a Day., Disp: 90 tablet, Rfl: 3  •  glucose blood test strip, 1 each by Other route 4 (Four) Times a Day. Use as instructed, Disp: 100 each, Rfl: 12  •  insulin glargine (LANTUS) 100 UNIT/ML injection, Inject 90 Units under the skin Every 12 (Twelve) Hours., Disp: 1 each, Rfl: 11  •  Insulin Lispro (HUMALOG KWIKPEN) 100 UNIT/ML solution pen-injector, Inject 45 Units under the skin 3 (Three) Times a Day Before Meals., Disp: 60 mL, Rfl: 11  •  Insulin Pen Needle (NOVOFINE) 30G X 8 MM misc, As directed 4 times daily, Disp: 100 each, Rfl: 11  •  lisinopril (PRINIVIL,ZESTRIL) 10 MG tablet, TAKE 1 TABLET BY MOUTH DAILY., Disp: 30 tablet, Rfl: 3  •  metFORMIN (GLUMETZA) 1000 MG (MOD) 24 hr tablet, , Disp: , Rfl: 3  •  metFORMIN (GLUMETZA) 1000 MG (MOD) 24 hr tablet, TAKE 1 TABLET BY MOUTH 2 (TWO) TIMES A DAY WITH MEALS., Disp: 60 tablet, Rfl: 3  •  metoprolol tartrate (LOPRESSOR) 50 MG tablet, Take 1 tablet by mouth 2 (Two) Times a Day., Disp: 60 tablet, Rfl: 3  •  naproxen (NAPROSYN) 500 MG tablet, Take 1 tablet by mouth 2 (Two) Times a Day With Meals., Disp: 60 tablet, Rfl: 3  •  nitroglycerin (NITROSTAT) 0.4 MG SL tablet, Place 1 tablet under the tongue As Needed for chest pain. Take no more than 3 doses in 15 minutes., Disp: 30 tablet, Rfl: 3  •  omeprazole-sodium bicarbonate (ZEGERID)  MG per capsule, , Disp: , Rfl: 3  •  pantoprazole (PROTONIX) 40 MG EC tablet, TAKE 1 TABLET BY MOUTH DAILY., Disp: 30 tablet, Rfl: 3  •  permethrin (ELIMITE) 5 % cream, Apply  topically. Massage into skin from head to feet, remove 8-14 hours later with water, Disp: , Rfl:   •  potassium chloride (K-DUR) 10 MEQ CR tablet, TAKE 1 TABLET BY MOUTH EVERY NIGHT., Disp: 30 tablet, Rfl: 3  •  promethazine (PHENERGAN) 25 MG tablet, Take  25 mg by mouth every 6 (six) hours as needed for nausea or vomiting., Disp: , Rfl:   ALLERGIES:  Clindamycin/lincomycin and Other    REVIEW OF SYSTEMS  Review of Systems  General:negative for - chills, hot flashes, malaise, night sweats, weight gain or weight loss. Positive for fever and fatigue. Positive for intermittent headaches.  Psychological: negative for - anxiety, depression, sleep disturbances or suicidal ideation  Ophthalmic: negative for - blurry vision or loss of vision  ENT: negative for - hearing change, nasal congestion or sore throat  Hematological and Lymphatic: negative for - jaundice  Endocrine: negative for - hair pattern changes, skin changes or temperature intolerance  Respiratory: Positive for cough, wheezing, and shortness of breath aggravated with exertion.  Cardiovascular: no chest pain.  Gastrointestinal: no  Nausea/vomiting, abdominal pain, change in bowel habits, or black or bloody stools  Genito-Urinary: no dysuria, trouble voiding, or hematuria  Musculoskeletal: negative for - joint pain or muscle pain  Neurological: negative for - dizziness,numbness/tingling or seizures    PHYSICAL EXAM:  Temp:  [98.1 °F (36.7 °C)] 98.1 °F (36.7 °C)  Heart Rate:  [75-82] 80  Resp:  [22] 22  BP: (137-170)/() 165/77  Physical Exam  General Appearance:    Alert, cooperative, no distress, appears stated age   Head:    Normocephalic, without obvious abnormality, atraumatic   Eyes:    PERRL, conjunctiva/corneas clear, EOM's intact   Ears:    Normal TM's and external ear canals, both ears   Nose:   Nares normal, septum midline, mucosa normal, no drainage     or sinus tenderness   Throat:   Lips, mucosa, and tongue normal; teeth and gums normal   Neck:   Supple, symmetrical, trachea midline, no adenopathy;     thyroid:  no enlargement/tenderness/nodules   Back:     Symmetric, no curvature, ROM normal, no CVA tenderness   Lungs:     Wheezing in bilateral lower lung fields, decreased breath sounds in  bilateral lower lung fields.   Chest Wall:    No tenderness or deformity    Heart:    Regular rate and rhythm, S1 and S2 normal, no murmur, rub    or gallop   Abdomen:     Soft, non-tender, bowel sounds active all four quadrants,     no masses, no organomegaly   Extremities:   Extremities normal, atraumatic, no cyanosis or edema.Positive for 1+ pitting edema in bilateral lower extremities.    Pulses:   2+ and symmetric all extremities   Skin:   Skin color, texture, turgor normal, no rashes or lesions   Lymph nodes:   Cervical, supraclavicular nodes normal   Neurologic:   CNII-XII grossly intact     DIAGNOSTIC DATA:   Lab Results (last 24 hours)     Procedure Component Value Units Date/Time    CBC & Differential [[id number]] Collected:  03/16/17 1100    Specimen:  Blood Updated:  03/16/17 1121    Narrative:       The following orders were created for panel order CBC & Differential.  Procedure                               Abnormality         Status                     ---------                               -----------         ------                     CBC Auto Differential[[id number]]         Abnormal            Final result                 Please view results for these tests on the individual orders.    CBC Auto Differential [[id number]]  (Abnormal) Collected:  03/16/17 1100    Specimen:  Blood Updated:  03/16/17 1121     WBC 9.56 10*3/mm3      RBC 3.74 (L) 10*6/mm3      Hemoglobin 8.8 (L) g/dL      Hematocrit 28.3 (L) %      MCV 75.7 (L) fL      MCH 23.5 (L) pg      MCHC 31.1 (L) g/dL      RDW 17.4 (H) %      RDW-SD 48.7 (H) fl      MPV 9.1 fL      Platelets 249 10*3/mm3      Neutrophil % 61.0 %      Lymphocyte % 27.7 %      Monocyte % 6.7 %      Eosinophil % 3.5 %      Basophil % 0.3 %      Immature Grans % 0.8 (H) %      Neutrophils, Absolute 5.83 10*3/mm3      Lymphocytes, Absolute 2.65 10*3/mm3      Monocytes, Absolute 0.64 10*3/mm3      Eosinophils, Absolute 0.33 10*3/mm3      Basophils, Absolute 0.03 10*3/mm3       Immature Grans, Absolute 0.08 (H) 10*3/mm3     Protime-INR [[id number]]  (Normal) Collected:  03/16/17 1100    Specimen:  Blood Updated:  03/16/17 1134     Protime 13.7 Seconds      INR 1.05     Narrative:       Therapeutic range for most indications is 2.0-3.0 INR,  or 2.5-3.5 for mechanical heart valves.    aPTT [[id number]]  (Normal) Collected:  03/16/17 1100    Specimen:  Blood Updated:  03/16/17 1134     PTT 29.7 seconds     Narrative:       The recommended Heparin therapeutic range is 68-97 seconds.    Comprehensive Metabolic Panel [[id number]]  (Abnormal) Collected:  03/16/17 1100    Specimen:  Blood Updated:  03/16/17 1136     Glucose 217 (H) mg/dL      BUN 22 (H) mg/dL      Creatinine 0.85 mg/dL      Sodium 141 mmol/L      Potassium 4.0 mmol/L      Chloride 99 mmol/L      CO2 29.0 mmol/L      Calcium 8.6 mg/dL      Total Protein 7.0 g/dL      Albumin 3.40 g/dL      ALT (SGPT) 24 U/L      AST (SGOT) 30 U/L      Alkaline Phosphatase 190 (H) U/L      Total Bilirubin 0.5 mg/dL      eGFR Non African Amer 69 mL/min/1.73      Globulin 3.6 (H) gm/dL      A/G Ratio 0.9 (L) g/dL      BUN/Creatinine Ratio 25.9 (H)      Anion Gap 13.0 mmol/L     BNP [[id number]]  (Abnormal) Collected:  03/16/17 1100    Specimen:  Blood Updated:  03/16/17 1148     proBNP 928.0 (H) pg/mL     Troponin [[id number]]  (Normal) Collected:  03/16/17 1100    Specimen:  Blood Updated:  03/16/17 1148     Troponin I 0.033 ng/mL     Influenza Antigen [[id number]]  (Normal) Collected:  03/16/17 1204    Specimen:  Swab from Nasopharynx Updated:  03/16/17 1226     Influenza A Ag, EIA Negative      Influenza B Ag, EIA Negative            Imaging Results (last 24 hours)     Procedure Component Value Units Date/Time    XR Chest 2 View [[id number]] Collected:  03/16/17 1130     Updated:  03/16/17 1137    Narrative:         [facility] [place]    Patient Name: DALE FINK    ORDERING: SMILEY JEFFERS     ATTENDING:    REFERRING: PETER E  ZEYAD    -----------------------    PROCEDURE: Two-view chest    COMPARISON: 12/11/16    HISTORY: Shortness of breath    FINDINGS: Frontal and lateral views of the chest are obtained.     Devices: None    Lungs/Pleura: Lung base opacities, right worse than left,  suspicious for pneumonia or pulmonary edema. Possible small right  pleural effusion.    Cardiomediastinal structures: Heart is enlarged.  Sternal suture  wires appear stable.          Impression:       CONCLUSION:    Lung base opacities, right worse than left, suspicious for  pneumonia or pulmonary edema. Possible small right pleural  effusion.    Electronically signed by:  Umesh aPrra MD  3/16/2017 11:36 AM  CDT Workstation: [id number]          I reviewed the patient's new clinical results.  I reviewed the patient's new imaging results and agree with the interpretation.  I reviewed the patient's other test results and agree with the interpretation  I personally viewed and interpreted the patient's EKG/Telemetry data    ASSESSMENT AND PLAN: This is a 57 y.o. female with:    Hospital Problem List     Diabetes mellitus type 2 in obese    Overview Addendum 3/16/2017  7:00 PM by Portillo Argueta MD     Levemir 90 units q 12 hours, novolog SSI             Chronic obstructive pulmonary disease    Overview Signed 3/16/2017  7:04 PM by Portillo Argueta MD     Duo Nebs, Symbicort, oxygen supplementation             Tobacco dependence syndrome    Overview Addendum 3/16/2017  7:04 PM by Portillo Argueta MD     1/2ppd  - Nicotine patch ordered             Sleep apnea    Overview Signed 3/16/2017  7:04 PM by Portillo Argueta MD     CPAP ordered         Peripheral vascular disease    Morbid obesity due to excess calories    Overview Addendum 3/16/2017  7:04 PM by Portillo Argueta MD     BMI 44.5, dietary consult ordered, ADA/heart healthy diet ordered             GERD (gastroesophageal reflux disease)    Overview Signed 3/16/2017  7:05 PM by Portillo Argueta MD     Protonix          Essential hypertension    Overview Signed 3/16/2017  7:05 PM by Portillo Argueta MD     Home lisinopril and metoprolol, will administer IV lasix 80 mg today, 40 mg BID thereafter             Dyslipidemia    Overview Signed 3/16/2017  7:05 PM by Portillo Argueta MD     Home atorvastatin         Asthma        Acute respiratory failure with hypoxia    Overview Signed 3/16/2017  7:06 PM by Portillo Argueta MD     Oxygen supplementation, ABG, duo-nebs, Solumedrol 60 BID ordered.          Community acquired pneumonia    Overview Signed 3/16/2017  7:08 PM by Portillo Argueta MD     Azithromycin 500 mg IV daily, and Ceftriaxone 1 g IV daily ordered, DuoNebs, sputum culture, legionella ag, mycoplasma PCR, pneumococcal ag ordered. CRP and lactic acid ordered upon admission. Incentive spirometry ordered, oxygen supplementation as needed.         Prophylactic hospital isolation    Overview Signed 3/16/2017  7:08 PM by Portillo Argueta MD     History of CRE infection.         CAD    Continue on home Plavix, Metoprolol, atorvastatin.       DVT prophylaxis: Heparin  Code status is Full Code  [facility] # [id number] , reviewed and scanned into medical record.      I discussed the patients findings and my recommendations with patient and family.     Dr. Martinez is the attending on record at time of admission, she is aware of the patient's status and agrees with the above history and physical.        This document has been electronically signed by Portillo Copeland MD on March 16, 2017 7:11 PM

## 2017-03-17 PROBLEM — J18.9 SEPSIS DUE TO PNEUMONIA: Status: ACTIVE | Noted: 2017-03-17

## 2017-03-17 PROBLEM — A41.9 SEPSIS DUE TO PNEUMONIA: Status: ACTIVE | Noted: 2017-03-17

## 2017-03-17 PROBLEM — J04.0 LARYNGITIS, ACUTE: Status: ACTIVE | Noted: 2017-03-17

## 2017-03-17 LAB
ALBUMIN SERPL-MCNC: 3.3 G/DL (ref 3.4–4.8)
ALBUMIN/GLOB SERPL: 0.8 G/DL (ref 1.1–1.8)
ALP SERPL-CCNC: 168 U/L (ref 38–126)
ALT SERPL W P-5'-P-CCNC: 31 U/L (ref 9–52)
ANION GAP SERPL CALCULATED.3IONS-SCNC: 13 MMOL/L (ref 5–15)
AST SERPL-CCNC: 48 U/L (ref 14–36)
BACTERIA SPEC AEROBE CULT: NORMAL
BASOPHILS # BLD AUTO: 0.01 10*3/MM3 (ref 0–0.2)
BASOPHILS NFR BLD AUTO: 0.1 % (ref 0–2)
BILIRUB SERPL-MCNC: 0.5 MG/DL (ref 0.2–1.3)
BUN BLD-MCNC: 25 MG/DL (ref 7–21)
BUN/CREAT SERPL: 29.4 (ref 7–25)
CALCIUM SPEC-SCNC: 8.3 MG/DL (ref 8.4–10.2)
CHLORIDE SERPL-SCNC: 96 MMOL/L (ref 95–110)
CO2 SERPL-SCNC: 29 MMOL/L (ref 22–31)
CREAT BLD-MCNC: 0.85 MG/DL (ref 0.5–1)
D-LACTATE SERPL-SCNC: 1.2 MMOL/L (ref 0.5–2)
DEPRECATED RDW RBC AUTO: 48.1 FL (ref 36.4–46.3)
EOSINOPHIL # BLD AUTO: 0 10*3/MM3 (ref 0–0.7)
EOSINOPHIL NFR BLD AUTO: 0 % (ref 0–7)
ERYTHROCYTE [DISTWIDTH] IN BLOOD BY AUTOMATED COUNT: 17.4 % (ref 11.5–14.5)
GFR SERPL CREATININE-BSD FRML MDRD: 69 ML/MIN/1.73 (ref 60–120)
GLOBULIN UR ELPH-MCNC: 3.9 GM/DL (ref 2.3–3.5)
GLUCOSE BLD-MCNC: 393 MG/DL (ref 60–100)
HCT VFR BLD AUTO: 27.9 % (ref 35–45)
HGB BLD-MCNC: 8.6 G/DL (ref 12–15.5)
HOLD SPECIMEN: NORMAL
IMM GRANULOCYTES # BLD: 0.17 10*3/MM3 (ref 0–0.02)
IMM GRANULOCYTES NFR BLD: 2 % (ref 0–0.5)
LYMPHOCYTES # BLD AUTO: 1.47 10*3/MM3 (ref 0.6–4.2)
LYMPHOCYTES NFR BLD AUTO: 17.5 % (ref 10–50)
MCH RBC QN AUTO: 23.1 PG (ref 26.5–34)
MCHC RBC AUTO-ENTMCNC: 30.8 G/DL (ref 31.4–36)
MCV RBC AUTO: 75 FL (ref 80–98)
MONOCYTES # BLD AUTO: 0.3 10*3/MM3 (ref 0–0.9)
MONOCYTES NFR BLD AUTO: 3.6 % (ref 0–12)
MYCOPLASMAE PNEUMONIAE BY PCR: NEGATIVE
NEUTROPHILS # BLD AUTO: 6.47 10*3/MM3 (ref 2–8.6)
NEUTROPHILS NFR BLD AUTO: 76.8 % (ref 37–80)
PLATELET # BLD AUTO: 261 10*3/MM3 (ref 150–450)
PMV BLD AUTO: 8.9 FL (ref 8–12)
POTASSIUM BLD-SCNC: 4.6 MMOL/L (ref 3.5–5.1)
PROT SERPL-MCNC: 7.2 G/DL (ref 6.3–8.6)
RBC # BLD AUTO: 3.72 10*6/MM3 (ref 3.77–5.16)
SODIUM BLD-SCNC: 138 MMOL/L (ref 137–145)
WBC NRBC COR # BLD: 8.42 10*3/MM3 (ref 3.2–9.8)

## 2017-03-17 PROCEDURE — 25010000002 METHYLPREDNISOLONE PER 125 MG: Performed by: FAMILY MEDICINE

## 2017-03-17 PROCEDURE — 94799 UNLISTED PULMONARY SVC/PX: CPT

## 2017-03-17 PROCEDURE — 80053 COMPREHEN METABOLIC PANEL: CPT | Performed by: FAMILY MEDICINE

## 2017-03-17 PROCEDURE — 85025 COMPLETE CBC W/AUTO DIFF WBC: CPT | Performed by: FAMILY MEDICINE

## 2017-03-17 PROCEDURE — 87581 M.PNEUMON DNA AMP PROBE: CPT | Performed by: FAMILY MEDICINE

## 2017-03-17 PROCEDURE — 25010000002 CEFTRIAXONE: Performed by: FAMILY MEDICINE

## 2017-03-17 PROCEDURE — 87077 CULTURE AEROBIC IDENTIFY: CPT | Performed by: FAMILY MEDICINE

## 2017-03-17 PROCEDURE — 87186 SC STD MICRODIL/AGAR DIL: CPT | Performed by: FAMILY MEDICINE

## 2017-03-17 PROCEDURE — 87205 SMEAR GRAM STAIN: CPT | Performed by: FAMILY MEDICINE

## 2017-03-17 PROCEDURE — 25010000002 ENOXAPARIN PER 10 MG: Performed by: FAMILY MEDICINE

## 2017-03-17 PROCEDURE — 25010000002 FUROSEMIDE PER 20 MG: Performed by: FAMILY MEDICINE

## 2017-03-17 PROCEDURE — 25010000002 AZITHROMYCIN: Performed by: FAMILY MEDICINE

## 2017-03-17 PROCEDURE — 63710000001 INSULIN ASPART PER 5 UNITS: Performed by: FAMILY MEDICINE

## 2017-03-17 PROCEDURE — 82962 GLUCOSE BLOOD TEST: CPT

## 2017-03-17 PROCEDURE — 63710000001 INSULIN DETEMIR PER 5 UNITS: Performed by: FAMILY MEDICINE

## 2017-03-17 PROCEDURE — 87147 CULTURE TYPE IMMUNOLOGIC: CPT | Performed by: FAMILY MEDICINE

## 2017-03-17 PROCEDURE — 94760 N-INVAS EAR/PLS OXIMETRY 1: CPT

## 2017-03-17 PROCEDURE — 87070 CULTURE OTHR SPECIMN AEROBIC: CPT | Performed by: FAMILY MEDICINE

## 2017-03-17 PROCEDURE — 99233 SBSQ HOSP IP/OBS HIGH 50: CPT | Performed by: FAMILY MEDICINE

## 2017-03-17 PROCEDURE — 83605 ASSAY OF LACTIC ACID: CPT | Performed by: FAMILY MEDICINE

## 2017-03-17 RX ADMIN — PANTOPRAZOLE SODIUM 40 MG: 40 INJECTION, POWDER, FOR SOLUTION INTRAVENOUS at 06:25

## 2017-03-17 RX ADMIN — INSULIN ASPART 24 UNITS: 100 INJECTION, SOLUTION INTRAVENOUS; SUBCUTANEOUS at 22:00

## 2017-03-17 RX ADMIN — IPRATROPIUM BROMIDE AND ALBUTEROL SULFATE 3 ML: 2.5; .5 SOLUTION RESPIRATORY (INHALATION) at 06:57

## 2017-03-17 RX ADMIN — CYCLOBENZAPRINE HYDROCHLORIDE 10 MG: 10 TABLET, FILM COATED ORAL at 17:48

## 2017-03-17 RX ADMIN — IPRATROPIUM BROMIDE AND ALBUTEROL SULFATE 3 ML: 2.5; .5 SOLUTION RESPIRATORY (INHALATION) at 11:18

## 2017-03-17 RX ADMIN — METOPROLOL TARTRATE 50 MG: 50 TABLET ORAL at 09:19

## 2017-03-17 RX ADMIN — ENOXAPARIN SODIUM 40 MG: 40 INJECTION SUBCUTANEOUS at 09:22

## 2017-03-17 RX ADMIN — IPRATROPIUM BROMIDE AND ALBUTEROL SULFATE 3 ML: 2.5; .5 SOLUTION RESPIRATORY (INHALATION) at 14:08

## 2017-03-17 RX ADMIN — GABAPENTIN 800 MG: 400 CAPSULE ORAL at 21:33

## 2017-03-17 RX ADMIN — INSULIN DETEMIR 95 UNITS: 100 INJECTION, SOLUTION SUBCUTANEOUS at 21:39

## 2017-03-17 RX ADMIN — GABAPENTIN 800 MG: 400 CAPSULE ORAL at 13:08

## 2017-03-17 RX ADMIN — CYCLOBENZAPRINE HYDROCHLORIDE 10 MG: 10 TABLET, FILM COATED ORAL at 09:19

## 2017-03-17 RX ADMIN — INSULIN ASPART 24 UNITS: 100 INJECTION, SOLUTION INTRAVENOUS; SUBCUTANEOUS at 17:48

## 2017-03-17 RX ADMIN — INSULIN ASPART 24 UNITS: 100 INJECTION, SOLUTION INTRAVENOUS; SUBCUTANEOUS at 12:34

## 2017-03-17 RX ADMIN — FUROSEMIDE 40 MG: 10 INJECTION, SOLUTION INTRAMUSCULAR; INTRAVENOUS at 09:18

## 2017-03-17 RX ADMIN — CETIRIZINE HYDROCHLORIDE 10 MG: 10 TABLET, FILM COATED ORAL at 09:19

## 2017-03-17 RX ADMIN — INSULIN ASPART 20 UNITS: 100 INJECTION, SOLUTION INTRAVENOUS; SUBCUTANEOUS at 09:22

## 2017-03-17 RX ADMIN — INSULIN ASPART 10 UNITS: 100 INJECTION, SOLUTION INTRAVENOUS; SUBCUTANEOUS at 23:11

## 2017-03-17 RX ADMIN — AZITHROMYCIN 500 MG: 500 INJECTION, POWDER, LYOPHILIZED, FOR SOLUTION INTRAVENOUS at 17:00

## 2017-03-17 RX ADMIN — BUDESONIDE AND FORMOTEROL FUMARATE DIHYDRATE 2 PUFF: 160; 4.5 AEROSOL RESPIRATORY (INHALATION) at 11:16

## 2017-03-17 RX ADMIN — METHYLPREDNISOLONE SODIUM SUCCINATE 60 MG: 125 INJECTION, POWDER, FOR SOLUTION INTRAMUSCULAR; INTRAVENOUS at 21:38

## 2017-03-17 RX ADMIN — NICOTINE 1 PATCH: 14 PATCH, EXTENDED RELEASE TRANSDERMAL at 21:36

## 2017-03-17 RX ADMIN — GABAPENTIN 800 MG: 400 CAPSULE ORAL at 06:25

## 2017-03-17 RX ADMIN — POTASSIUM CHLORIDE 10 MEQ: 750 TABLET, FILM COATED, EXTENDED RELEASE ORAL at 09:22

## 2017-03-17 RX ADMIN — CLOPIDOGREL BISULFATE 75 MG: 75 TABLET ORAL at 09:19

## 2017-03-17 RX ADMIN — INSULIN DETEMIR 90 UNITS: 100 INJECTION, SOLUTION SUBCUTANEOUS at 09:22

## 2017-03-17 RX ADMIN — CEFTRIAXONE 1 G: 1 INJECTION, POWDER, FOR SOLUTION INTRAMUSCULAR; INTRAVENOUS at 09:22

## 2017-03-17 RX ADMIN — METHYLPREDNISOLONE SODIUM SUCCINATE 60 MG: 125 INJECTION, POWDER, FOR SOLUTION INTRAMUSCULAR; INTRAVENOUS at 09:18

## 2017-03-17 RX ADMIN — LISINOPRIL 10 MG: 10 TABLET ORAL at 09:19

## 2017-03-17 RX ADMIN — ATORVASTATIN CALCIUM 20 MG: 40 TABLET, FILM COATED ORAL at 09:19

## 2017-03-17 RX ADMIN — METOPROLOL TARTRATE 50 MG: 50 TABLET ORAL at 17:49

## 2017-03-17 NOTE — PROGRESS NOTES
FAMILY MEDICINE PROGRESS NOTE  NAME: Yamileth Slater  : 1960  MRN: [id number]     LOS: 1 day   Full Code  PROVIDER OF SERVICE:     Chief Complaint:  Acute respiratory failure with hypoxia    Subjective     Interval History:  History taken from: patient  Subjective: Patient is a 58 yo  female who was admitted for sepsis secondary to pneumonia.  Patient also has laryngitis.  She has been sick for a while, but feels a little better today.    Review of Systems:   Review of Systems   Constitutional: Negative for activity change, appetite change, chills and fever.   HENT: Positive for sore throat and voice change. Negative for congestion and trouble swallowing.    Eyes: Negative for photophobia and visual disturbance.   Respiratory: Positive for cough, shortness of breath (much improved since admission) and wheezing.    Cardiovascular: Positive for leg swelling. Negative for chest pain and palpitations.   Gastrointestinal: Negative for abdominal pain, constipation, diarrhea, nausea and vomiting.   Endocrine: Negative for polydipsia and polyphagia.   Genitourinary: Negative for difficulty urinating, dysuria and frequency.   Musculoskeletal: Negative for arthralgias and gait problem.   Skin: Positive for wound (complains of abscess in groin). Negative for color change, pallor and rash.   Neurological: Negative for weakness, light-headedness and headaches.   Psychiatric/Behavioral: Negative for sleep disturbance. The patient is not nervous/anxious.        Objective     Vital Signs  Temp:  [96.9 °F (36.1 °C)-98.2 °F (36.8 °C)] 96.9 °F (36.1 °C)  Heart Rate:  [] 91  Resp:  [18-20] 20  BP: (124-165)/(63-86) 140/70    Physical Exam  Physical Exam   Constitutional: She is oriented to person, place, and time. She appears well-developed and well-nourished. No distress.   HENT:   Voice hoarse   Cardiovascular: Normal rate, regular rhythm and intact distal pulses.  Exam reveals no gallop and no friction  rub.    Murmur heard.  Pulmonary/Chest: Effort normal and breath sounds normal. No respiratory distress. She has no wheezes. She has no rales.   Diminished breath sounds in the bases   Abdominal: Soft. Bowel sounds are normal. She exhibits no distension. There is no tenderness. There is no rebound and no guarding.   Musculoskeletal: She exhibits edema.   Neurological: She is alert and oriented to person, place, and time.   Skin: She is not diaphoretic.   Psychiatric: She has a normal mood and affect. Her behavior is normal. Judgment and thought content normal.   Nursing note and vitals reviewed.      Medication Review    Current Facility-Administered Medications:   •  acetaminophen (TYLENOL) tablet 650 mg, 650 mg, Oral, Q4H PRN, Portillo Argueta MD  •  atorvastatin (LIPITOR) tablet 20 mg, 20 mg, Oral, Daily, Portillo Argueta MD, 20 mg at 03/17/17 0919  •  AZITHROMYCIN 500 MG/250 ML 0.9% NS IVPB (vial-mate), 500 mg, Intravenous, Q24H, Portillo Argueta MD  •  bisacodyl (DULCOLAX) EC tablet 5 mg, 5 mg, Oral, Daily PRN, Portillo Argueta MD  •  bisacodyl (DULCOLAX) suppository 10 mg, 10 mg, Rectal, Daily PRN, Portillo Argueta MD  •  budesonide-formoterol (SYMBICORT) 160-4.5 MCG/ACT inhaler 2 puff, 2 puff, Inhalation, BID - RT, Portillo Argueta MD, 2 puff at 03/17/17 1116  •  cefTRIAXone (ROCEPHIN) 1 g/100 mL 0.9% NS (MBP), 1 g, Intravenous, Q24H, Portillo Argueta MD, 1 g at 03/17/17 0922  •  cetirizine (zyrTEC) tablet 10 mg, 10 mg, Oral, Daily, Portillo Argueta MD, 10 mg at 03/17/17 0919  •  clopidogrel (PLAVIX) tablet 75 mg, 75 mg, Oral, Daily, Portillo Argueta MD, 75 mg at 03/17/17 0919  •  cyclobenzaprine (FLEXERIL) tablet 10 mg, 10 mg, Oral, BID, Portillo Argueta MD, 10 mg at 03/17/17 0919  •  dextrose (D50W) solution 25 g, 25 g, Intravenous, Q15 Min PRN, Portillo Argueta MD  •  dextrose (GLUTOSE) oral gel 15 g, 15 g, Oral, Q15 Min PRN, Portillo Argueta MD  •  enoxaparin (LOVENOX) syringe 40 mg, 40 mg, Subcutaneous, Q24H, Yadira  MD Isaura, 40 mg at 03/17/17 0922  •  furosemide (LASIX) injection 40 mg, 40 mg, Intravenous, Daily, Portillo Argueta MD, 40 mg at 03/17/17 0918  •  furosemide (LASIX) injection 80 mg, 80 mg, Intravenous, Once, Portillo Argueta MD, 80 mg at 03/16/17 2207  •  gabapentin (NEURONTIN) capsule 800 mg, 800 mg, Oral, Q8H, Poritllo Argueta MD, 800 mg at 03/17/17 1308  •  glucagon (human recombinant) (GLUCAGEN DIAGNOSTIC) injection 1 mg, 1 mg, Subcutaneous, Q15 Min PRN, Portillo Argueta MD  •  insulin aspart (novoLOG) injection 0-24 Units, 0-24 Units, Subcutaneous, 4x Daily AC & at Bedtime, Portillo Argueta MD, 24 Units at 03/17/17 1234  •  insulin detemir (LEVEMIR) injection 95 Units, 95 Units, Subcutaneous, Nightly, Yadira Delarosa MD  •  [START ON 3/18/2017] insulin detemir (LEVEMIR) injection 95 Units, 95 Units, Subcutaneous, QAM, Yadira Delarosa MD  •  ipratropium-albuterol (DUO-NEB) nebulizer solution 3 mL, 3 mL, Nebulization, 4x Daily - RT, Portillo Argueta MD, 3 mL at 03/17/17 1408  •  lisinopril (PRINIVIL,ZESTRIL) tablet 10 mg, 10 mg, Oral, Q24H, Portillo Argueta MD, 10 mg at 03/17/17 0919  •  methylPREDNISolone sodium succinate (SOLU-Medrol) injection 60 mg, 60 mg, Intravenous, Q12H, Portillo Argueta MD, 60 mg at 03/17/17 0918  •  metoprolol tartrate (LOPRESSOR) tablet 50 mg, 50 mg, Oral, BID, Portillo Argueta MD, 50 mg at 03/17/17 0919  •  nicotine (NICODERM CQ) 14 MG/24HR patch 1 patch, 1 patch, Transdermal, Q24H, Portillo Argueta MD, 1 patch at 03/16/17 2158  •  nitroglycerin (NITROSTAT) SL tablet 0.4 mg, 0.4 mg, Sublingual, PRN, Portillo Argueta MD  •  ondansetron (ZOFRAN) injection 4 mg, 4 mg, Intravenous, Q6H PRN, Portillo Argueta MD  •  pantoprazole (PROTONIX) injection 40 mg, 40 mg, Intravenous, Q AM, Portillo Argueta MD, 40 mg at 03/17/17 0625  •  potassium chloride (K-DUR) CR tablet 10 mEq, 10 mEq, Oral, Daily, Portillo Argueta MD, 10 mEq at 03/17/17 0922  •  promethazine (PHENERGAN) tablet 25 mg, 25 mg, Oral, Q6H  PRN, Portillo Argueta MD  •  sodium chloride 0.9 % flush 1-10 mL, 1-10 mL, Intravenous, PRN, Portillo Argueta MD, 10 mL at 03/16/17 5551     Diagnostic Data      I reviewed the patient's new clinical results and imaging.      Assessment/Plan     Active Hospital Problems (** Indicates Principal Problem)    Diagnosis Date Noted   • **Acute respiratory failure with hypoxia [J96.01] 03/16/2017     Oxygen supplementation, ABG, duo-nebs, Solumedrol 60 BID     • Sepsis due to pneumonia [J18.9, A41.9] 03/17/2017     - Azithromycin 500 mg IV daily, and Ceftriaxone 1 g IV daily, DuoNebs  - Sputum culture - pending. mycoplasma PCR -pending;   - Legionella ag - negative, pneumococcal ag - negative.  - Lactic acid 3.1 on admission, repeat 2.0; now 1.2  - CRP 28.30, trend CRP  - Incentive spirometry, supplemental oxygen PRN     • Laryngitis, acute [J04.0] 03/17/2017   • Community acquired pneumonia [J18.9] 03/16/2017     -Azithromycin 500 mg IV daily, and Ceftriaxone 1 g IV daily, DuoNebs  - Sputum culture - pending. mycoplasma PCR -pending; Legionella ag - negative, pneumococcal ag - negative.  - Lactic acid 3.1 on admission, repeat 2.0; now 1.2  - CRP 28.30, trend QOD   - Incentive spirometry, supplemental oxygen PRN     • Prophylactic hospital isolation [Z41.8] 03/16/2017     History of CRE infection continue contact precautions     • Diabetes mellitus type 2 in obese [E11.9, E66.9] 08/24/2016     - Increase night time Levemir to 95u qhs;  Levemir 90 units qAM, novolog SSI     • Chronic obstructive pulmonary disease [J44.9] 08/24/2016     Duo Nebs, Symbicort, oxygen supplementation     • Tobacco dependence syndrome [F17.200]      1/2ppd  - Nicotine patch ordered     • Sleep apnea [G47.30]      CPAP ordered     • Dyslipidemia [E78.5]      Home atorvastatin     • Morbid obesity due to excess calories [E66.01]      BMI 44.5, dietary consult ordered     • Essential hypertension [I10]      Home lisinopril and metoprolol, will  administer IV lasix 80 mg today, 40 mg BID thereafter     • Asthma [J45.909]    • GERD (gastroesophageal reflux disease) [K21.9]      Protonix     • Peripheral vascular disease [I73.9]    • Coronary arteriosclerosis [I25.10]      S/p CABG X3 in 2013, continue home Plavix, Metoprolol, and Atorvastatin        Resolved Hospital Problems    Diagnosis Date Noted Date Resolved   No resolved problems to display.         DVT prophylaxis: Heparin      Disposition:Home with home health          This document has been electronically signed by Charlette Bowden MD on March 17, 2017 4:53 PM          This document has been electronically signed by Charlette Bowden MD on March 17, 2017 4:53 PM

## 2017-03-17 NOTE — PLAN OF CARE
Problem: Pneumonia (Adult)  Goal: Signs and Symptoms of Listed Potential Problems Will be Absent or Manageable (Pneumonia)  Outcome: Ongoing (interventions implemented as appropriate)    03/17/17 0421   Pneumonia   Problems Assessed (Pneumonia) all   Problems Present (Pneumonia) progression of infection;respiratory compromise         Problem: Diabetes, Type 2 (Adult)  Goal: Signs and Symptoms of Listed Potential Problems Will be Absent or Manageable (Diabetes, Type 2)  Outcome: Ongoing (interventions implemented as appropriate)

## 2017-03-17 NOTE — CONSULTS
"Adult Nutrition  Assessment    Patient Name:  Yamileth Slater  YOB: 1960  MRN: [id number]  Admit Date:  3/16/2017    Assessment Date:  3/17/2017          Reason for Assessment       03/17/17 1847    Reason for Assessment    Reason For Assessment/Visit education    Identified At Risk By Screening Criteria BMI                  Anthropometrics       03/17/17 1848    Anthropometrics    Height 157.5 cm (62.01\")    Weight 127 kg (279 lb 15.8 oz)    Ideal Body Weight (IBW)    Ideal Body Weight (IBW), Female 50.85    % Ideal Body Weight 250.28    Body Mass Index (BMI)    BMI (kg/m2) 51.3    BMI Grade greater than 40 - obesity grade III            Labs/Tests/Procedures/Meds       03/17/17 1848    Labs/Tests/Procedures/Meds    Labs/Tests Review Alb;BUN;Glucose;Hgb Hct    Medication Review Insulin;Diuretic;Reviewed, pertinent            Physical Findings       03/17/17 1849    Physical Findings/Assessment    Additional Documentation Physical Appearance (Group)    Physical Appearance    Skin other (see comments)   blisters left leg            Estimated/Assessed Needs       03/17/17 1849    Calculation Measurements    Weight Used For Calculations 69.2 kg (152 lb 8.9 oz)    Height Used for Calculations 1.572 m (5' 1.89\")    Estimated/Assessed Energy Needs    Energy Need Method Oxford-St Jeor    Age 57    RMR (Oxford-St. Jeor Equation) 1223.5    Activity Factors (Oxford St Jeor)  Out of bed, ambulatory  1.3    Total estimated needs (Oxford St. Jeor) 1590    Estimated/Assessed Protein Needs    Weight Used for Protein Calculation 69.2 kg (152 lb 8.9 oz)    Estimated Protein Range 69 - 83    Estimated/Assessed Fluid Needs    Fluid Need Method --   2076            Nutrition Prescription Ordered       03/17/17 1853    Nutrition Prescription PO    Current PO Diet Regular    Common Modifiers Cardiac;Consistent Carbohydrate            Evaluation of Received Nutrient/Fluid Intake       03/17/17 1854    PO " Evaluation    Number of Meals 2    % PO Intake 1005 - 2x            Comments:  Pt reports pretty good appetite.  Voiced no food preferences.  Intake 100% - 2x.  Reports hx nausea.  Protonix, PRN zofran and phenergan prescribed.  Blood glucose is elevated.  Levemir insulin, sliding scale novolog prescribed.  BMI 51.  Consult received regarding diet ed.  Pt requested diet ed be done later but accepted info regarding Wt Loss, ADA, Heart Healthy diet.        Electronically signed by:  Ratna Tomlin RD  03/17/17 6:54 PM

## 2017-03-17 NOTE — PLAN OF CARE
Problem: Pneumonia (Adult)  Goal: Signs and Symptoms of Listed Potential Problems Will be Absent or Manageable (Pneumonia)  Outcome: Ongoing (interventions implemented as appropriate)    Problem: Diabetes, Type 2 (Adult)  Goal: Signs and Symptoms of Listed Potential Problems Will be Absent or Manageable (Diabetes, Type 2)  Outcome: Ongoing (interventions implemented as appropriate)

## 2017-03-17 NOTE — H&P
Acute respiratory failure with hypoxia  Subjective:     Yamileth Slater is a 57 y.o. female who presents for  shortness of breath, cough nonproductive, fever 103°F that started yesterday.  She started with fatigue about 4 days ago with a dry hacking cough headache wheezing and sore throat.  She went to her neighbor's house yesterday to see if she could borrow her oxygen.  She was dyspneic on exertion.  She is using CPAP machine at night.  She does continue to smoke about a half a pack a day of cigarettes.  The following portions of the patient's history were reviewed and updated as appropriate: allergies, current medications, past family history, past medical history, past social history, past surgical history and problem list.    Concurrent Medical Hx:  Past Medical History   Diagnosis Date   • Anxiety states    • Asthma    • Candidiasis of mouth    • Candidiasis of vulva and vagina    • Carotid artery stenosis      DWIGHT 0-15%, LICA 0-15%. LICA stent 5/2014.   • Chest pain    • Chronic folliculitis    • Chronic obstructive lung disease    • Coronary arteriosclerosis    • Diabetes mellitus      no retinopathy; A1C 9.1      • Dyslipidemia    • Dysphagia    • Encounter for general adult medical examination with abnormal findings    • Essential hypertension    • Excoriated eczema      asteosis/keratosis   • Furuncle of neck    • GERD (gastroesophageal reflux disease)    • History of colon polyps    • Hypertensive disorder    • Infection of skin and subcutaneous tissue      rt perineal abscess   • Infestation by Sarcoptes scabiei deepak hominis    • Kidney stone    • Leukocytosis    • Lower limb anomaly      Abscess of lower limb - ANTX causing n/v      • Mixed hyperlipidemia    • Morbid obesity due to excess calories      BMI 44.5   • Need for vaccination    • Neurologic disorder associated with diabetes mellitus    • Non-healing surgical wound      LLE EVHa   • Pain      LLE   • Peripheral vascular disease    • Rash     • Shoulder joint pain    • Sleep apnea    • Surgical follow-up care      5/27/14 L carotid stent   • Tobacco dependence syndrome      1/2ppd      • Type 2 diabetes mellitus    • Viral wart      RIGHT middle phalanx   • Vitamin D deficiency        Past Surgical Hx:  Past Surgical History   Procedure Laterality Date   • Coronary artery bypass graft  11/15/2013     CABG x 3 with LIMA to LAD and coronary endarterectomy to LAD, SVG to Ramus intermedius branch and SVG to PDA.    • Cardiac catheterization  08/09/2013     Severe multivessel CAD with critical lesions noted in the RCA, obtuse marginal two, ramus intermemdious, and LAD as described above. Normal LV systolic function with no wall motion abnormality.    • Cardiac catheterization  05/27/2014      LEFT Carotid Stent    • Colonoscopy  05/02/2016     Normal colon.Diverticulosis in the sigmoid colon.No specimens collected.    • Other surgical history  01/25/2016     DESTRUCTION OF BENIGN LESION      • Endoscopy  09/23/2015      Esophageal stricture present.Normal stomach.Normal duodenum.    • Endoscopy  11/16/2015     w/ dilatation - Esophageal stricture present,Dilatation performed.Normal stomach and duodenum.    • Other surgical history  04/18/2014     ear/neck - L attempted CEA, Neck Dissection    • Incision and drainage abscess  01/02/2016     Incision and drainage of right perineal abscess.    • Incision and drainage abscess  02/18/2014     Incision and Drainage of abscess of left lower leg      Family Hx:  Family History   Problem Relation Age of Onset   • Coronary artery disease Other    • Diabetes Other    • Heart disease Other    • Hypertension Other       Social History:  Social History     Social History   • Marital status:      Spouse name: N/A   • Number of children: N/A   • Years of education: N/A     Occupational History   • Not on file.     Social History Main Topics   • Smoking status: Current Every Day Smoker     Types: Cigarettes   •  Smokeless tobacco: Not on file   • Alcohol use No   • Drug use: No   • Sexual activity: Not on file     Other Topics Concern   • Not on file     Social History Narrative       Home Medications:  No current facility-administered medications on file prior to encounter.      Current Outpatient Prescriptions on File Prior to Encounter   Medication Sig Dispense Refill   • atorvastatin (LIPITOR) 20 MG tablet TAKE 1 TABLET BY MOUTH DAILY. 30 tablet 3   • Blood Glucose Monitoring Suppl ([employer] BLOOD GLUCOSE METER) W/DEVICE kit 1 each 3 (Three) Times a Day. 1 kit 3   • budesonide-formoterol (SYMBICORT) 160-4.5 MCG/ACT inhaler Inhale 2 puffs 2 (Two) Times a Day. 1 inhaler 6   • cetirizine (zyrTEC) 10 MG tablet Take 1 tablet by mouth Daily. 30 tablet 3   • chlorhexidine (HIBICLENS) 4 % external liquid Apply  topically Daily. 118 mL 0   • clobetasol (CLOBEX) 0.05 % lotion apply to skin bid x 1-2w then prn. generic 118 g 3   • clopidogrel (PLAVIX) 75 MG tablet TAKE 1 TABLET BY MOUTH DAILY. 30 tablet 3   • cyclobenzaprine (FLEXERIL) 10 MG tablet Take 1 tablet by mouth 2 (Two) Times a Day. 60 tablet 2   • furosemide (LASIX) 40 MG tablet Take 1 tablet by mouth Daily. 30 tablet 3   • gabapentin (NEURONTIN) 800 MG tablet Take 1 tablet by mouth 3 (Three) Times a Day. 90 tablet 3   • glucose blood test strip 1 each by Other route 4 (Four) Times a Day. Use as instructed 100 each 12   • insulin glargine (LANTUS) 100 UNIT/ML injection Inject 90 Units under the skin Every 12 (Twelve) Hours. 1 each 11   • Insulin Lispro (HUMALOG KWIKPEN) 100 UNIT/ML solution pen-injector Inject 45 Units under the skin 3 (Three) Times a Day Before Meals. 60 mL 11   • Insulin Pen Needle (NOVOFINE) 30G X 8 MM misc As directed 4 times daily 100 each 11   • lisinopril (PRINIVIL,ZESTRIL) 10 MG tablet TAKE 1 TABLET BY MOUTH DAILY. 30 tablet 3   • metFORMIN (GLUMETZA) 1000 MG (MOD) 24 hr tablet   3   • metFORMIN (GLUMETZA) 1000 MG (MOD) 24 hr tablet TAKE 1 TABLET BY  MOUTH 2 (TWO) TIMES A DAY WITH MEALS. 60 tablet 3   • metoprolol tartrate (LOPRESSOR) 50 MG tablet Take 1 tablet by mouth 2 (Two) Times a Day. 60 tablet 3   • naproxen (NAPROSYN) 500 MG tablet Take 1 tablet by mouth 2 (Two) Times a Day With Meals. 60 tablet 3   • nitroglycerin (NITROSTAT) 0.4 MG SL tablet Place 1 tablet under the tongue As Needed for chest pain. Take no more than 3 doses in 15 minutes. 30 tablet 3   • omeprazole-sodium bicarbonate (ZEGERID)  MG per capsule   3   • pantoprazole (PROTONIX) 40 MG EC tablet TAKE 1 TABLET BY MOUTH DAILY. 30 tablet 3   • permethrin (ELIMITE) 5 % cream Apply  topically. Massage into skin from head to feet, remove 8-14 hours later with water     • potassium chloride (K-DUR) 10 MEQ CR tablet TAKE 1 TABLET BY MOUTH EVERY NIGHT. 30 tablet 3   • promethazine (PHENERGAN) 25 MG tablet Take 25 mg by mouth every 6 (six) hours as needed for nausea or vomiting.         Allergies:  Clindamycin/lincomycin and Other    Review of Systems  Review of Systems   Constitutional: Positive for activity change and fever. Negative for appetite change and fatigue.   HENT: Positive for sore throat. Negative for ear pain, sinus pressure and sneezing.    Eyes: Negative for pain, discharge, itching and visual disturbance.   Respiratory: Positive for cough and shortness of breath.    Cardiovascular: Negative for chest pain and palpitations.   Gastrointestinal: Negative for abdominal pain, constipation, diarrhea and nausea.   Endocrine: Negative for cold intolerance, heat intolerance, polydipsia, polyphagia and polyuria.   Genitourinary: Negative for difficulty urinating, dysuria, frequency and urgency.   Musculoskeletal: Negative for arthralgias and gait problem.   Skin: Positive for rash. Negative for color change.   Neurological: Negative for dizziness, weakness and headaches.   Hematological: Negative for adenopathy. Does not bruise/bleed easily.   Psychiatric/Behavioral: Positive for sleep  "disturbance. Negative for agitation and confusion.       Objective:     Visit Vitals   • /72 (BP Location: Right arm, Patient Position: Sitting)   • Pulse 96   • Temp 98.2 °F (36.8 °C) (Axillary)   • Resp 20   • Ht 62\" (157.5 cm)   • Wt 280 lb (127 kg)   • SpO2 94%   • BMI 51.21 kg/m2     Physical Exam   Constitutional: She is oriented to person, place, and time. She appears well-developed and well-nourished.   HENT:   Head: Normocephalic and atraumatic.   Right Ear: Hearing and external ear normal.   Left Ear: Hearing and external ear normal.   Nose: Nose normal.   Mouth/Throat: Oropharynx is clear and moist.   Eyes: Conjunctivae and EOM are normal. Pupils are equal, round, and reactive to light.   Neck: Normal range of motion. Neck supple.   Cardiovascular: Normal rate, regular rhythm, normal heart sounds and intact distal pulses.    Pulmonary/Chest: Effort normal. She has rales (bibasilar).   Decreased breath sounds bilaterally   Abdominal: Soft. Bowel sounds are normal. She exhibits no distension. There is no tenderness. There is no rebound and no guarding.   Musculoskeletal: Normal range of motion. She exhibits no edema.   Neurological: She is alert and oriented to person, place, and time.   Skin: Skin is warm and dry.   Multiple macular lesions on face no purulent drainage.    Psychiatric: She has a normal mood and affect. Her speech is normal and behavior is normal. Judgment and thought content normal. Cognition and memory are normal.     I reviewed the patient's new clinical results.  I reviewed the patient's new imaging results.  Assessment/Plan:     Active Hospital Problems (** Indicates Principal Problem)    Diagnosis Date Noted   • **Acute respiratory failure with hypoxia [J96.01] 03/16/2017     Oxygen supplementation, ABG, duo-nebs, Solumedrol 60 BID ordered.      • Community acquired pneumonia [J18.9] 03/16/2017     Azithromycin 500 mg IV daily, and Ceftriaxone 1 g IV daily ordered, Boo, " sputum culture, legionella ag, mycoplasma PCR, pneumococcal ag ordered. CRP and lactic acid ordered upon admission. Incentive spirometry ordered, oxygen supplementation as needed.     • Prophylactic hospital isolation [Z41.8] 03/16/2017     History of CRE infection.     • Pneumonia of both lungs due to infectious organism [J18.9] 03/16/2017   • Diabetes mellitus type 2 in obese [E11.9, E66.9] 08/24/2016     Levemir 90 units q 12 hours, novolog SSI     • Chronic obstructive pulmonary disease [J44.9] 08/24/2016     Duo Nebs, Symbicort, oxygen supplementation     • Tobacco dependence syndrome [F17.200]      1/2ppd  - Nicotine patch ordered     • Sleep apnea [G47.30]      CPAP ordered     • Dyslipidemia [E78.5]      Home atorvastatin     • Morbid obesity due to excess calories [E66.01]      BMI 44.5, dietary consult ordered     • Essential hypertension [I10]      Home lisinopril and metoprolol, will administer IV lasix 80 mg today, 40 mg BID thereafter     • Asthma [J45.909]    • GERD (gastroesophageal reflux disease) [K21.9]      Protonix     • Peripheral vascular disease [I73.9]    • Coronary arteriosclerosis [I25.10]      S/p CABG X3 in 2013, continue home Plavix, Metoprolol, and Atorvastatin        Resolved Hospital Problems    Diagnosis Date Noted Date Resolved   No resolved problems to display.         I have seen and examined the patient.  I have reviewed the notes, assessments, and/or procedures performed by Dr. Raymundo, I concur with her/his documentation and assessment and plan for Yamileth Slater.        This document has been electronically signed by Pauline Martinez MD on March 16, 2017 8:12 PM

## 2017-03-17 NOTE — PROGRESS NOTES
FAMILY MEDICINE DAILY PROGRESS NOTE  NAME: Yamileth Slater  : 1960  MRN: [id number]     LOS: 1 day     PROVIDER OF SERVICE: Yadira Delarosa MD    Chief Complaint: Acute respiratory failure with hypoxia    Subjective:     Interval History:  History taken from: patient  Patient admitted with acute hypoxic respiratory failure and sepsis djoya godwin, states her breathing is improved with supplemental oxygen, she still has cough but has not been able to produce sputum for culture.     Review of Systems:   Review of Systems   Constitutional: Negative for activity change, appetite change, chills and fever.   HENT: Positive for sore throat and voice change. Negative for congestion and trouble swallowing.    Eyes: Negative for photophobia and visual disturbance.   Respiratory: Positive for cough, shortness of breath (much improved since admission) and wheezing.    Cardiovascular: Positive for leg swelling. Negative for chest pain and palpitations.   Gastrointestinal: Negative for abdominal pain, constipation, diarrhea, nausea and vomiting.   Endocrine: Negative for polydipsia and polyphagia.   Genitourinary: Negative for difficulty urinating, dysuria and frequency.   Musculoskeletal: Negative for arthralgias and gait problem.   Skin: Positive for wound (complains of abscess in groin). Negative for color change, pallor and rash.   Neurological: Negative for weakness, light-headedness and headaches.   Psychiatric/Behavioral: Negative for sleep disturbance. The patient is not nervous/anxious.        Objective:     Vital Signs  Temp:  [96.9 °F (36.1 °C)-98.2 °F (36.8 °C)] 96.9 °F (36.1 °C)  Heart Rate:  [] (P) 78  Resp:  [18-22] (P) 18  BP: (124-170)/() 132/63  Body mass index is 51.21 kg/(m^2).     Physical Exam  Physical Exam   Constitutional: She is oriented to person, place, and time. She appears well-developed and well-nourished. She is active and cooperative. No distress.   Obese   HENT:   Head:  Normocephalic and atraumatic.   Nose: Nose normal.   Eyes: Conjunctivae and EOM are normal. Pupils are equal, round, and reactive to light.   Neck: Normal range of motion. Neck supple.   Cardiovascular: Normal rate, regular rhythm, normal heart sounds and intact distal pulses.    Pulmonary/Chest: Effort normal. No respiratory distress. She has decreased breath sounds. She has wheezes (throughout). She has rhonchi in the right lower field and the left lower field.   Abdominal: Soft. Bowel sounds are normal. She exhibits no distension. There is no tenderness.   Genitourinary: There is no rash, tenderness or lesion on the right labia. There is no rash, tenderness or lesion on the left labia.   Genitourinary Comments: No abscess in groin   Musculoskeletal: Normal range of motion. She exhibits edema (1+ pitting bilateral lower ext).   Neurological: She is alert and oriented to person, place, and time.   Skin: Skin is warm and dry.   Multiple excoriations on face, arms, abdomen, legs without evidence of infection.   Vitals reviewed.      Medication Review    Current Facility-Administered Medications:   •  acetaminophen (TYLENOL) tablet 650 mg, 650 mg, Oral, Q4H PRN, Portillo Argueta MD  •  atorvastatin (LIPITOR) tablet 20 mg, 20 mg, Oral, Daily, Portillo Argueta MD, 20 mg at 03/16/17 2153  •  AZITHROMYCIN 500 MG/250 ML 0.9% NS IVPB (vial-mate), 500 mg, Intravenous, Q24H, Portillo Argueta MD  •  bisacodyl (DULCOLAX) EC tablet 5 mg, 5 mg, Oral, Daily PRN, Portillo Argueta MD  •  bisacodyl (DULCOLAX) suppository 10 mg, 10 mg, Rectal, Daily PRN, Portillo Argueta MD  •  budesonide-formoterol (SYMBICORT) 160-4.5 MCG/ACT inhaler 2 puff, 2 puff, Inhalation, BID - RT, Portillo Argueta MD  •  cefTRIAXone (ROCEPHIN) 1 g/100 mL 0.9% NS (MBP), 1 g, Intravenous, Q24H, Portillo Argueta MD  •  cetirizine (zyrTEC) tablet 10 mg, 10 mg, Oral, Daily, Portillo Argueta MD, 10 mg at 03/16/17 4726  •  clopidogrel (PLAVIX) tablet 75 mg, 75 mg, Oral, Daily, Portillo  MD Ellie, 75 mg at 03/16/17 2154  •  cyclobenzaprine (FLEXERIL) tablet 10 mg, 10 mg, Oral, BID, Portillo Argueta MD, 10 mg at 03/16/17 2155  •  dextrose (D50W) solution 25 g, 25 g, Intravenous, Q15 Min PRN, Portillo Argueta MD  •  dextrose (GLUTOSE) oral gel 15 g, 15 g, Oral, Q15 Min PRN, Portillo Argueta MD  •  furosemide (LASIX) injection 40 mg, 40 mg, Intravenous, Daily, Portillo Argueta MD  •  furosemide (LASIX) injection 80 mg, 80 mg, Intravenous, Once, Portillo Argueta MD, 80 mg at 03/16/17 2207  •  gabapentin (NEURONTIN) capsule 800 mg, 800 mg, Oral, Q8H, Portillo Argueta MD, 800 mg at 03/17/17 0625  •  glucagon (human recombinant) (GLUCAGEN DIAGNOSTIC) injection 1 mg, 1 mg, Subcutaneous, Q15 Min PRN, Portillo Argueta MD  •  heparin (porcine) 5000 UNIT/ML injection 5,000 Units, 5,000 Units, Subcutaneous, Q12H, Portillo Argueta MD, 5,000 Units at 03/16/17 2209  •  insulin aspart (novoLOG) injection 0-24 Units, 0-24 Units, Subcutaneous, 4x Daily AC & at Bedtime, Portillo Argueta MD, 16 Units at 03/16/17 2208  •  insulin detemir (LEVEMIR) injection 90 Units, 90 Units, Subcutaneous, QAM, aYdira Delarosa MD  •  insulin detemir (LEVEMIR) injection 95 Units, 95 Units, Subcutaneous, Nightly, Yadira Delarosa MD  •  ipratropium-albuterol (DUO-NEB) nebulizer solution 3 mL, 3 mL, Nebulization, 4x Daily - RT, Portillo Argueta MD, 3 mL at 03/17/17 0657  •  lisinopril (PRINIVIL,ZESTRIL) tablet 10 mg, 10 mg, Oral, Q24H, Portillo Argueta MD, 10 mg at 03/16/17 2156  •  methylPREDNISolone sodium succinate (SOLU-Medrol) injection 60 mg, 60 mg, Intravenous, Q12H, Portillo Argueta MD, 60 mg at 03/16/17 2157  •  metoprolol tartrate (LOPRESSOR) tablet 50 mg, 50 mg, Oral, BID, Portillo Argueta MD, 50 mg at 03/16/17 2157  •  nicotine (NICODERM CQ) 14 MG/24HR patch 1 patch, 1 patch, Transdermal, Q24H, Portillo Argueta MD, 1 patch at 03/16/17 2158  •  nitroglycerin (NITROSTAT) SL tablet 0.4 mg, 0.4 mg, Sublingual, PRN, Portillo Argueta MD  •   ondansetron (ZOFRAN) injection 4 mg, 4 mg, Intravenous, Q6H PRN, Portillo Argueta MD  •  pantoprazole (PROTONIX) injection 40 mg, 40 mg, Intravenous, Q AM, Portillo Argueta MD, 40 mg at 03/17/17 0625  •  potassium chloride (K-DUR) CR tablet 10 mEq, 10 mEq, Oral, Daily, Portillo Argueta MD, 10 mEq at 03/16/17 2216  •  promethazine (PHENERGAN) tablet 25 mg, 25 mg, Oral, Q6H PRN, Portillo Argueta MD  •  sodium chloride 0.9 % flush 1-10 mL, 1-10 mL, Intravenous, PRN, Portillo Argueta MD, 10 mL at 03/16/17 2216  •  Insert peripheral IV, , , Once **AND** sodium chloride 0.9 % flush 10 mL, 10 mL, Intravenous, PRN, Abel Bryan MD     Diagnostic Data    Lab Results (last 24 hours)     Procedure Component Value Units Date/Time    CBC & Differential [[id number]] Collected:  03/16/17 1100    Specimen:  Blood Updated:  03/16/17 1121    Narrative:       The following orders were created for panel order CBC & Differential.  Procedure                               Abnormality         Status                     ---------                               -----------         ------                     CBC Auto Differential[[id number]]         Abnormal            Final result                 Please view results for these tests on the individual orders.    CBC Auto Differential [[id number]]  (Abnormal) Collected:  03/16/17 1100    Specimen:  Blood Updated:  03/16/17 1121     WBC 9.56 10*3/mm3      RBC 3.74 (L) 10*6/mm3      Hemoglobin 8.8 (L) g/dL      Hematocrit 28.3 (L) %      MCV 75.7 (L) fL      MCH 23.5 (L) pg      MCHC 31.1 (L) g/dL      RDW 17.4 (H) %      RDW-SD 48.7 (H) fl      MPV 9.1 fL      Platelets 249 10*3/mm3      Neutrophil % 61.0 %      Lymphocyte % 27.7 %      Monocyte % 6.7 %      Eosinophil % 3.5 %      Basophil % 0.3 %      Immature Grans % 0.8 (H) %      Neutrophils, Absolute 5.83 10*3/mm3      Lymphocytes, Absolute 2.65 10*3/mm3      Monocytes, Absolute 0.64 10*3/mm3      Eosinophils, Absolute 0.33 10*3/mm3      Basophils,  Absolute 0.03 10*3/mm3      Immature Grans, Absolute 0.08 (H) 10*3/mm3     Protime-INR [[id number]]  (Normal) Collected:  03/16/17 1100    Specimen:  Blood Updated:  03/16/17 1134     Protime 13.7 Seconds      INR 1.05     Narrative:       Therapeutic range for most indications is 2.0-3.0 INR,  or 2.5-3.5 for mechanical heart valves.    aPTT [[id number]]  (Normal) Collected:  03/16/17 1100    Specimen:  Blood Updated:  03/16/17 1134     PTT 29.7 seconds     Narrative:       The recommended Heparin therapeutic range is 68-97 seconds.    Comprehensive Metabolic Panel [[id number]]  (Abnormal) Collected:  03/16/17 1100    Specimen:  Blood Updated:  03/16/17 1136     Glucose 217 (H) mg/dL      BUN 22 (H) mg/dL      Creatinine 0.85 mg/dL      Sodium 141 mmol/L      Potassium 4.0 mmol/L      Chloride 99 mmol/L      CO2 29.0 mmol/L      Calcium 8.6 mg/dL      Total Protein 7.0 g/dL      Albumin 3.40 g/dL      ALT (SGPT) 24 U/L      AST (SGOT) 30 U/L      Alkaline Phosphatase 190 (H) U/L      Total Bilirubin 0.5 mg/dL      eGFR Non African Amer 69 mL/min/1.73      Globulin 3.6 (H) gm/dL      A/G Ratio 0.9 (L) g/dL      BUN/Creatinine Ratio 25.9 (H)      Anion Gap 13.0 mmol/L     BNP [[id number]]  (Abnormal) Collected:  03/16/17 1100    Specimen:  Blood Updated:  03/16/17 1148     proBNP 928.0 (H) pg/mL     Troponin [[id number]]  (Normal) Collected:  03/16/17 1100    Specimen:  Blood Updated:  03/16/17 1148     Troponin I 0.033 ng/mL     Influenza Antigen [[id number]]  (Normal) Collected:  03/16/17 1204    Specimen:  Swab from Nasopharynx Updated:  03/16/17 1226     Influenza A Ag, EIA Negative      Influenza B Ag, EIA Negative     Blood Culture [[id number]] Collected:  03/16/17 2000    Specimen:  Blood from Hand, Right Updated:  03/16/17 2054    Blood Culture [[id number]] Collected:  03/16/17 1950    Specimen:  Blood from Arm, Right Updated:  03/16/17 2054    C-reactive Protein [[id number]]  (Abnormal) Collected:  03/16/17 2000    Specimen:   Blood Updated:  03/16/17 2124     C-Reactive Protein 28.30 (H) mg/dL     Lactic Acid, Plasma [[id number]]  (Abnormal) Collected:  03/16/17 2000    Specimen:  Blood Updated:  03/16/17 2129     Lactate 3.1 (C) mmol/L     POC Glucose Fingerstick [[id number]]  (Abnormal) Collected:  03/16/17 1958    Specimen:  Blood Updated:  03/16/17 2133     Glucose 346 (H) mg/dL       RN NotifiedMeter: [id number]: [id number] GEORGE CHILD       Legionella Antigen, Urine [[id number]]  (Normal) Collected:  03/16/17 2147    Specimen:  Urine from Urine, Clean Catch Updated:  03/16/17 2239     LEGIONELLA ANTIGEN, URINE Negative     S. Pneumo Ag Urine or CSF [[id number]]  (Normal) Collected:  03/16/17 2147    Specimen:  Urine from Urine, Clean Catch Updated:  03/16/17 2240     Strep Pneumo Ag Negative     Lactate Acid, Reflex [[id number]]  (Normal) Collected:  03/16/17 2330    Specimen:  Blood Updated:  03/16/17 2349     Lactate 2.0 mmol/L     Extra Tubes [[id number]] Collected:  03/16/17 2000    Specimen:  Blood from Blood, Venous Line Updated:  03/17/17 0001    Narrative:       The following orders were created for panel order Extra Tubes.  Procedure                               Abnormality         Status                     ---------                               -----------         ------                     Green Top (Gel)[[id number]]                                   Final result                 Please view results for these tests on the individual orders.    Green Top (Gel) [[id number]] Collected:  03/16/17 2000    Specimen:  Blood Updated:  03/17/17 0001     Extra Tube Hold for add-ons.       Auto resulted.       CBC & Differential [[id number]] Collected:  03/17/17 0538    Specimen:  Blood Updated:  03/17/17 0546    Narrative:       The following orders were created for panel order CBC & Differential.  Procedure                               Abnormality         Status                     ---------                                -----------         ------                     CBC Auto Differential[[id number]]         Abnormal            Final result                 Please view results for these tests on the individual orders.    CBC Auto Differential [[id number]]  (Abnormal) Collected:  03/17/17 0538    Specimen:  Blood Updated:  03/17/17 0546     WBC 8.42 10*3/mm3      RBC 3.72 (L) 10*6/mm3      Hemoglobin 8.6 (L) g/dL      Hematocrit 27.9 (L) %      MCV 75.0 (L) fL      MCH 23.1 (L) pg      MCHC 30.8 (L) g/dL      RDW 17.4 (H) %      RDW-SD 48.1 (H) fl      MPV 8.9 fL      Platelets 261 10*3/mm3      Neutrophil % 76.8 %      Lymphocyte % 17.5 %      Monocyte % 3.6 %      Eosinophil % 0.0 %      Basophil % 0.1 %      Immature Grans % 2.0 (H) %      Neutrophils, Absolute 6.47 10*3/mm3      Lymphocytes, Absolute 1.47 10*3/mm3      Monocytes, Absolute 0.30 10*3/mm3      Eosinophils, Absolute 0.00 10*3/mm3      Basophils, Absolute 0.01 10*3/mm3      Immature Grans, Absolute 0.17 (H) 10*3/mm3     Lactic Acid, Plasma [[id number]]  (Normal) Collected:  03/17/17 0538    Specimen:  Blood Updated:  03/17/17 0555     Lactate 1.2 mmol/L     Comprehensive Metabolic Panel [[id number]]  (Abnormal) Collected:  03/17/17 0538    Specimen:  Blood Updated:  03/17/17 0602     Glucose 393 (H) mg/dL      BUN 25 (H) mg/dL      Creatinine 0.85 mg/dL      Sodium 138 mmol/L      Potassium 4.6 mmol/L      Chloride 96 mmol/L      CO2 29.0 mmol/L      Calcium 8.3 (L) mg/dL      Total Protein 7.2 g/dL      Albumin 3.30 (L) g/dL      ALT (SGPT) 31 U/L      AST (SGOT) 48 (H) U/L      Alkaline Phosphatase 168 (H) U/L      Total Bilirubin 0.5 mg/dL      eGFR Non African Amer 69 mL/min/1.73      Globulin 3.9 (H) gm/dL      A/G Ratio 0.8 (L) g/dL      BUN/Creatinine Ratio 29.4 (H)      Anion Gap 13.0 mmol/L     Mycoplasma Pneumoniae PCR [[id number]] Collected:  03/17/17 0631    Specimen:  Sputum from Nasopharynx Updated:  03/17/17 0642    Urine Culture [[id number]]  (Normal) Collected:   03/16/17 2147    Specimen:  Urine from Urine, Clean Catch Updated:  03/17/17 0652     Urine Culture Culture in progress         I reviewed the patient's new clinical results.    Assessment/Plan:     Active Hospital Problems (** Indicates Principal Problem)    Diagnosis Date Noted   • **Acute respiratory failure with hypoxia [J96.01] 03/16/2017     Priority: High     Oxygen supplementation, ABG, duo-nebs, Solumedrol 60 BID     • Sepsis due to pneumonia [J18.9, A41.9] 03/17/2017     Priority: High     - Azithromycin 500 mg IV daily, and Ceftriaxone 1 g IV daily, DuoNebs  - Sputum culture - pending. mycoplasma PCR -pending;   - Legionella ag - negative, pneumococcal ag - negative.  - Lactic acid 3.1 on admission, repeat 2.0; now 1.2  - CRP 28.30, trend CRP  - Incentive spirometry, supplemental oxygen PRN     • Community acquired pneumonia [J18.9] 03/16/2017     Priority: Medium     -Azithromycin 500 mg IV daily, and Ceftriaxone 1 g IV daily, DuoNebs  - Sputum culture - pending. mycoplasma PCR -pending; Legionella ag - negative, pneumococcal ag - negative.  - Lactic acid 3.1 on admission, repeat 2.0; now 1.2  - CRP 28.30, trend QOD   - Incentive spirometry, supplemental oxygen PRN     • Prophylactic hospital isolation [Z41.8] 03/16/2017     History of CRE infection continue contact precautions     • Diabetes mellitus type 2 in obese [E11.9, E66.9] 08/24/2016     - Increase night time Levemir to 95u qhs;  Levemir 90 units qAM, novolog SSI     • Chronic obstructive pulmonary disease [J44.9] 08/24/2016     Duo Nebs, Symbicort, oxygen supplementation     • Tobacco dependence syndrome [F17.200]      1/2ppd  - Nicotine patch ordered     • Sleep apnea [G47.30]      CPAP ordered     • Dyslipidemia [E78.5]      Home atorvastatin     • Morbid obesity due to excess calories [E66.01]      BMI 44.5, dietary consult ordered     • Essential hypertension [I10]      Home lisinopril and metoprolol, will administer IV lasix 80 mg today,  40 mg BID thereafter     • Asthma [J45.909]    • GERD (gastroesophageal reflux disease) [K21.9]      Protonix     • Peripheral vascular disease [I73.9]    • Coronary arteriosclerosis [I25.10]      S/p CABG X3 in 2013, continue home Plavix, Metoprolol, and Atorvastatin        Resolved Hospital Problems    Diagnosis Date Noted Date Resolved   No resolved problems to display.     DVT prophylaxis: Heparin  Code status is Full Code    Plan for disposition:Home with home health sepsis protocol when appropriate    Signature  Yadira Delarosa MD PGY2  [place] [address]  Office: [phone]    This document has been electronically signed by Yadira Delarosa MD on March 17, 2017 7:51 AM

## 2017-03-18 PROBLEM — J15.212 PNEUMONIA OF BOTH LUNGS DUE TO METHICILLIN RESISTANT STAPHYLOCOCCUS AUREUS (MRSA): Status: ACTIVE | Noted: 2017-03-16

## 2017-03-18 LAB
ALBUMIN SERPL-MCNC: 3.4 G/DL (ref 3.4–4.8)
ALBUMIN/GLOB SERPL: 0.9 G/DL (ref 1.1–1.8)
ALP SERPL-CCNC: 165 U/L (ref 38–126)
ALT SERPL W P-5'-P-CCNC: 17 U/L (ref 9–52)
ANION GAP SERPL CALCULATED.3IONS-SCNC: 13 MMOL/L (ref 5–15)
AST SERPL-CCNC: 32 U/L (ref 14–36)
BASOPHILS # BLD AUTO: 0.01 10*3/MM3 (ref 0–0.2)
BASOPHILS NFR BLD AUTO: 0.1 % (ref 0–2)
BILIRUB SERPL-MCNC: 0.3 MG/DL (ref 0.2–1.3)
BUN BLD-MCNC: 30 MG/DL (ref 7–21)
BUN/CREAT SERPL: 32.3 (ref 7–25)
CALCIUM SPEC-SCNC: 8.8 MG/DL (ref 8.4–10.2)
CHLORIDE SERPL-SCNC: 96 MMOL/L (ref 95–110)
CO2 SERPL-SCNC: 30 MMOL/L (ref 22–31)
CREAT BLD-MCNC: 0.93 MG/DL (ref 0.5–1)
CRP SERPL-MCNC: 11.2 MG/DL (ref 0–1)
DEPRECATED RDW RBC AUTO: 47.3 FL (ref 36.4–46.3)
EOSINOPHIL # BLD AUTO: 0 10*3/MM3 (ref 0–0.7)
EOSINOPHIL NFR BLD AUTO: 0 % (ref 0–7)
ERYTHROCYTE [DISTWIDTH] IN BLOOD BY AUTOMATED COUNT: 17.4 % (ref 11.5–14.5)
GFR SERPL CREATININE-BSD FRML MDRD: 62 ML/MIN/1.73 (ref 60–120)
GLOBULIN UR ELPH-MCNC: 3.9 GM/DL (ref 2.3–3.5)
GLUCOSE BLD-MCNC: 357 MG/DL (ref 60–100)
GLUCOSE BLDC GLUCOMTR-MCNC: 398 MG/DL (ref 70–130)
HCT VFR BLD AUTO: 29.3 % (ref 35–45)
HGB BLD-MCNC: 9.1 G/DL (ref 12–15.5)
IMM GRANULOCYTES # BLD: 0.26 10*3/MM3 (ref 0–0.02)
IMM GRANULOCYTES NFR BLD: 1.9 % (ref 0–0.5)
LYMPHOCYTES # BLD AUTO: 1.73 10*3/MM3 (ref 0.6–4.2)
LYMPHOCYTES NFR BLD AUTO: 12.4 % (ref 10–50)
MCH RBC QN AUTO: 23.2 PG (ref 26.5–34)
MCHC RBC AUTO-ENTMCNC: 31.1 G/DL (ref 31.4–36)
MCV RBC AUTO: 74.7 FL (ref 80–98)
MONOCYTES # BLD AUTO: 0.89 10*3/MM3 (ref 0–0.9)
MONOCYTES NFR BLD AUTO: 6.4 % (ref 0–12)
NEUTROPHILS # BLD AUTO: 11.08 10*3/MM3 (ref 2–8.6)
NEUTROPHILS NFR BLD AUTO: 79.2 % (ref 37–80)
PLATELET # BLD AUTO: 339 10*3/MM3 (ref 150–450)
PMV BLD AUTO: 9.2 FL (ref 8–12)
POTASSIUM BLD-SCNC: 4.7 MMOL/L (ref 3.5–5.1)
PROT SERPL-MCNC: 7.3 G/DL (ref 6.3–8.6)
RBC # BLD AUTO: 3.92 10*6/MM3 (ref 3.77–5.16)
SODIUM BLD-SCNC: 139 MMOL/L (ref 137–145)
WBC NRBC COR # BLD: 13.97 10*3/MM3 (ref 3.2–9.8)

## 2017-03-18 PROCEDURE — 63710000001 INSULIN DETEMIR PER 5 UNITS: Performed by: FAMILY MEDICINE

## 2017-03-18 PROCEDURE — 25010000002 VANCOMYCIN PER 500 MG: Performed by: FAMILY MEDICINE

## 2017-03-18 PROCEDURE — 25010000002 ENOXAPARIN PER 10 MG: Performed by: FAMILY MEDICINE

## 2017-03-18 PROCEDURE — 82962 GLUCOSE BLOOD TEST: CPT

## 2017-03-18 PROCEDURE — 85025 COMPLETE CBC W/AUTO DIFF WBC: CPT | Performed by: FAMILY MEDICINE

## 2017-03-18 PROCEDURE — 63710000001 INSULIN ASPART PER 5 UNITS: Performed by: FAMILY MEDICINE

## 2017-03-18 PROCEDURE — 80053 COMPREHEN METABOLIC PANEL: CPT | Performed by: FAMILY MEDICINE

## 2017-03-18 PROCEDURE — 94799 UNLISTED PULMONARY SVC/PX: CPT

## 2017-03-18 PROCEDURE — 63710000001 PREDNISONE PER 5 MG: Performed by: FAMILY MEDICINE

## 2017-03-18 PROCEDURE — 94760 N-INVAS EAR/PLS OXIMETRY 1: CPT

## 2017-03-18 PROCEDURE — 86140 C-REACTIVE PROTEIN: CPT | Performed by: FAMILY MEDICINE

## 2017-03-18 PROCEDURE — 99232 SBSQ HOSP IP/OBS MODERATE 35: CPT | Performed by: FAMILY MEDICINE

## 2017-03-18 PROCEDURE — 25010000002 FUROSEMIDE PER 20 MG: Performed by: FAMILY MEDICINE

## 2017-03-18 PROCEDURE — 25010000002 AZITHROMYCIN: Performed by: FAMILY MEDICINE

## 2017-03-18 PROCEDURE — 25010000002 CEFTRIAXONE: Performed by: FAMILY MEDICINE

## 2017-03-18 RX ORDER — PREDNISONE 20 MG/1
40 TABLET ORAL EVERY 12 HOURS SCHEDULED
Status: DISCONTINUED | OUTPATIENT
Start: 2017-03-18 | End: 2017-03-21 | Stop reason: HOSPADM

## 2017-03-18 RX ORDER — SODIUM CHLORIDE 9 MG/ML
50 INJECTION, SOLUTION INTRAVENOUS CONTINUOUS
Status: DISCONTINUED | OUTPATIENT
Start: 2017-03-18 | End: 2017-03-19

## 2017-03-18 RX ADMIN — INSULIN ASPART 12 UNITS: 100 INJECTION, SOLUTION INTRAVENOUS; SUBCUTANEOUS at 16:36

## 2017-03-18 RX ADMIN — PREDNISONE 40 MG: 20 TABLET ORAL at 21:20

## 2017-03-18 RX ADMIN — AZITHROMYCIN 500 MG: 500 INJECTION, POWDER, LYOPHILIZED, FOR SOLUTION INTRAVENOUS at 18:15

## 2017-03-18 RX ADMIN — PANTOPRAZOLE SODIUM 40 MG: 40 INJECTION, POWDER, FOR SOLUTION INTRAVENOUS at 05:23

## 2017-03-18 RX ADMIN — CETIRIZINE HYDROCHLORIDE 10 MG: 10 TABLET, FILM COATED ORAL at 08:58

## 2017-03-18 RX ADMIN — GABAPENTIN 800 MG: 400 CAPSULE ORAL at 16:35

## 2017-03-18 RX ADMIN — ATORVASTATIN CALCIUM 20 MG: 40 TABLET, FILM COATED ORAL at 08:57

## 2017-03-18 RX ADMIN — INSULIN ASPART 20 UNITS: 100 INJECTION, SOLUTION INTRAVENOUS; SUBCUTANEOUS at 12:20

## 2017-03-18 RX ADMIN — IPRATROPIUM BROMIDE AND ALBUTEROL SULFATE 3 ML: 2.5; .5 SOLUTION RESPIRATORY (INHALATION) at 10:23

## 2017-03-18 RX ADMIN — NICOTINE 1 PATCH: 14 PATCH, EXTENDED RELEASE TRANSDERMAL at 21:20

## 2017-03-18 RX ADMIN — CLOPIDOGREL BISULFATE 75 MG: 75 TABLET ORAL at 08:57

## 2017-03-18 RX ADMIN — LISINOPRIL 10 MG: 10 TABLET ORAL at 08:57

## 2017-03-18 RX ADMIN — METOPROLOL TARTRATE 50 MG: 50 TABLET ORAL at 18:15

## 2017-03-18 RX ADMIN — CEFTRIAXONE 1 G: 1 INJECTION, POWDER, FOR SOLUTION INTRAMUSCULAR; INTRAVENOUS at 08:58

## 2017-03-18 RX ADMIN — SODIUM CHLORIDE 50 ML/HR: 9 INJECTION, SOLUTION INTRAVENOUS at 02:35

## 2017-03-18 RX ADMIN — INSULIN DETEMIR 95 UNITS: 100 INJECTION, SOLUTION SUBCUTANEOUS at 06:06

## 2017-03-18 RX ADMIN — FUROSEMIDE 40 MG: 10 INJECTION, SOLUTION INTRAMUSCULAR; INTRAVENOUS at 08:59

## 2017-03-18 RX ADMIN — VANCOMYCIN HYDROCHLORIDE 2000 MG: 1 INJECTION, POWDER, LYOPHILIZED, FOR SOLUTION INTRAVENOUS at 10:21

## 2017-03-18 RX ADMIN — ENOXAPARIN SODIUM 40 MG: 40 INJECTION SUBCUTANEOUS at 08:58

## 2017-03-18 RX ADMIN — GABAPENTIN 800 MG: 400 CAPSULE ORAL at 21:20

## 2017-03-18 RX ADMIN — INSULIN ASPART 30 UNITS: 100 INJECTION, SOLUTION INTRAVENOUS; SUBCUTANEOUS at 10:20

## 2017-03-18 RX ADMIN — POTASSIUM CHLORIDE 10 MEQ: 750 TABLET, FILM COATED, EXTENDED RELEASE ORAL at 09:00

## 2017-03-18 RX ADMIN — INSULIN ASPART 30 UNITS: 100 INJECTION, SOLUTION INTRAVENOUS; SUBCUTANEOUS at 12:20

## 2017-03-18 RX ADMIN — PREDNISONE 40 MG: 20 TABLET ORAL at 09:00

## 2017-03-18 RX ADMIN — INSULIN DETEMIR 95 UNITS: 100 INJECTION, SOLUTION SUBCUTANEOUS at 21:21

## 2017-03-18 RX ADMIN — BUDESONIDE AND FORMOTEROL FUMARATE DIHYDRATE 2 PUFF: 160; 4.5 AEROSOL RESPIRATORY (INHALATION) at 07:46

## 2017-03-18 RX ADMIN — IPRATROPIUM BROMIDE AND ALBUTEROL SULFATE 3 ML: 2.5; .5 SOLUTION RESPIRATORY (INHALATION) at 19:40

## 2017-03-18 RX ADMIN — METOPROLOL TARTRATE 50 MG: 50 TABLET ORAL at 08:57

## 2017-03-18 RX ADMIN — GABAPENTIN 800 MG: 400 CAPSULE ORAL at 05:23

## 2017-03-18 RX ADMIN — INSULIN ASPART 4 UNITS: 100 INJECTION, SOLUTION INTRAVENOUS; SUBCUTANEOUS at 22:30

## 2017-03-18 RX ADMIN — INSULIN ASPART 30 UNITS: 100 INJECTION, SOLUTION INTRAVENOUS; SUBCUTANEOUS at 18:15

## 2017-03-18 RX ADMIN — IPRATROPIUM BROMIDE AND ALBUTEROL SULFATE 3 ML: 2.5; .5 SOLUTION RESPIRATORY (INHALATION) at 14:40

## 2017-03-18 RX ADMIN — CYCLOBENZAPRINE HYDROCHLORIDE 10 MG: 10 TABLET, FILM COATED ORAL at 08:57

## 2017-03-18 RX ADMIN — INSULIN ASPART 20 UNITS: 100 INJECTION, SOLUTION INTRAVENOUS; SUBCUTANEOUS at 08:59

## 2017-03-18 RX ADMIN — CYCLOBENZAPRINE HYDROCHLORIDE 10 MG: 10 TABLET, FILM COATED ORAL at 18:15

## 2017-03-18 NOTE — SIGNIFICANT NOTE
03/18/17 1454   Rehab Treatment   Discipline occupational therapist   Rehab Evaluation   Evaluation Not Performed patient/family declined evaluation;other (see comments)  (OT orders recieved and per discussion with the pt and her RN the pt is I and does not need skilled OT tx. The pt reports taking a shower I'ly and that she feels comfortable going home. OT will defer eval and sign off at this time. )

## 2017-03-18 NOTE — PLAN OF CARE
Problem: Pneumonia (Adult)  Goal: Signs and Symptoms of Listed Potential Problems Will be Absent or Manageable (Pneumonia)  Outcome: Ongoing (interventions implemented as appropriate)    Problem: Diabetes, Type 2 (Adult)  Goal: Signs and Symptoms of Listed Potential Problems Will be Absent or Manageable (Diabetes, Type 2)  Outcome: Ongoing (interventions implemented as appropriate)    Problem: Patient Care Overview (Adult)  Goal: Plan of Care Review  Outcome: Ongoing (interventions implemented as appropriate)  Goal: Adult Individualization and Mutuality  Outcome: Ongoing (interventions implemented as appropriate)  Goal: Discharge Needs Assessment  Outcome: Ongoing (interventions implemented as appropriate)

## 2017-03-18 NOTE — SIGNIFICANT NOTE
03/18/17 1432   Rehab Treatment   Discipline physical therapist   Rehab Evaluation   Evaluation Not Performed other (see comments);patient/family declined evaluation  (Pt defers evaluation 2* independent, she reports just finishing a shower independently and was found walking around room without AD. She reports no need for PT at this time and no concerns upon d/c home. Pt aware if changes PT can return)

## 2017-03-18 NOTE — PROGRESS NOTES
[address] [address]  T - [phone]     PROGRESS NOTE         SUBJECTIVE:   Patient Care Team:  Yadira Delarosa MD as PCP - General (Family Medicine)  Jagdish Baez MD as Resident (Family Medicine)  Aguilar Collins MD as Resident (Family Medicine)  Ranjan Nicolas MD as Resident (Family Medicine)  Aracely Chong MD as Resident (Family Medicine)  Edgard Spencer MD as Resident (Family Medicine)  Maira Lim MA as Medical Assistant    Chief Complaint:     Chief Complaint   Patient presents with   • Cough   • Sore Throat   • Shortness of Breath   • Abscess       Subjective     Patient is 57 y.o. female presents with shortness of air, cough and congestion. Still has shortness of air when off oxygen        ROS/HISTORY/ CURRENT MEDICATIONS/OBJECTIVE/VS/PE:   Review of Systems:   Review of Systems   Constitutional: Negative.  Negative for fatigue and fever.   HENT: Negative.  Negative for ear pain and sore throat.    Eyes: Negative.  Negative for pain and visual disturbance.   Respiratory: Positive for shortness of breath and wheezing. Negative for cough.    Cardiovascular: Negative.  Negative for chest pain and palpitations.   Gastrointestinal: Negative for abdominal pain and nausea.   Endocrine: Negative.    Genitourinary: Negative for dysuria.   Musculoskeletal: Negative for arthralgias.   Skin: Negative for rash.   Allergic/Immunologic: Negative.    Neurological: Negative for dizziness, weakness and headaches.   Psychiatric/Behavioral: Negative for sleep disturbance.         Current Medications:     Current Facility-Administered Medications   Medication Dose Route Frequency Provider Last Rate Last Dose   • acetaminophen (TYLENOL) tablet 650 mg  650 mg Oral Q4H PRN Portillo Argueta MD       • atorvastatin (LIPITOR) tablet 20 mg  20 mg Oral Daily Portillo Argueta MD   20 mg at 03/18/17 0857   • AZITHROMYCIN 500 MG/250 ML 0.9% NS IVPB  (vial-mate)  500 mg Intravenous Q24H Portillo Argueta MD   500 mg at 03/17/17 1700   • bisacodyl (DULCOLAX) EC tablet 5 mg  5 mg Oral Daily PRN Portillo Argueta MD       • bisacodyl (DULCOLAX) suppository 10 mg  10 mg Rectal Daily PRN Portillo Argueta MD       • budesonide-formoterol (SYMBICORT) 160-4.5 MCG/ACT inhaler 2 puff  2 puff Inhalation BID - RT Portillo Argueta MD   2 puff at 03/18/17 0746   • cefTRIAXone (ROCEPHIN) 1 g/100 mL 0.9% NS (MBP)  1 g Intravenous Q24H Portillo Argueta MD   1 g at 03/18/17 0858   • cetirizine (zyrTEC) tablet 10 mg  10 mg Oral Daily Portillo Argueta MD   10 mg at 03/18/17 0858   • clopidogrel (PLAVIX) tablet 75 mg  75 mg Oral Daily Portillo Argueta MD   75 mg at 03/18/17 0857   • cyclobenzaprine (FLEXERIL) tablet 10 mg  10 mg Oral BID Portillo Argueta MD   10 mg at 03/18/17 0857   • dextrose (D50W) solution 25 g  25 g Intravenous Q15 Min PRN Portillo Argueta MD       • dextrose (GLUTOSE) oral gel 15 g  15 g Oral Q15 Min PRN Portillo Argueta MD       • enoxaparin (LOVENOX) syringe 40 mg  40 mg Subcutaneous Q24H Yadira Delarosa MD   40 mg at 03/18/17 0858   • furosemide (LASIX) injection 40 mg  40 mg Intravenous Daily Portillo Argueta MD   40 mg at 03/18/17 0859   • furosemide (LASIX) injection 80 mg  80 mg Intravenous Once Portillo Argueta MD   80 mg at 03/16/17 2207   • gabapentin (NEURONTIN) capsule 800 mg  800 mg Oral Q8H Portillo Argueta MD   800 mg at 03/18/17 0523   • glucagon (human recombinant) (GLUCAGEN DIAGNOSTIC) injection 1 mg  1 mg Subcutaneous Q15 Min PRN Portillo Argueta MD       • insulin aspart (novoLOG) injection 0-24 Units  0-24 Units Subcutaneous 4x Daily AC & at Bedtime Portillo Argueta MD   20 Units at 03/18/17 0859   • insulin detemir (LEVEMIR) injection 95 Units  95 Units Subcutaneous Nightly Yadira Delarosa MD   95 Units at 03/17/17 2139   • insulin detemir (LEVEMIR) injection 95 Units  95 Units Subcutaneous QAM Yadira Delarosa MD   95 Units at 03/18/17 0606   • insulin  lispro (humaLOG) injection 30 Units  30 Units Subcutaneous TID With Meals Alban Vázquez MD       • ipratropium-albuterol (DUO-NEB) nebulizer solution 3 mL  3 mL Nebulization 4x Daily - RT Portillo Argueta MD   3 mL at 03/17/17 1408   • lisinopril (PRINIVIL,ZESTRIL) tablet 10 mg  10 mg Oral Q24H Portillo Argueta MD   10 mg at 03/18/17 0857   • metoprolol tartrate (LOPRESSOR) tablet 50 mg  50 mg Oral BID Portillo Argueta MD   50 mg at 03/18/17 0857   • nicotine (NICODERM CQ) 14 MG/24HR patch 1 patch  1 patch Transdermal Q24H Portillo Argueta MD   1 patch at 03/17/17 2136   • nitroglycerin (NITROSTAT) SL tablet 0.4 mg  0.4 mg Sublingual PRN Portillo Argueta MD       • ondansetron (ZOFRAN) injection 4 mg  4 mg Intravenous Q6H PRN Portillo Argueta MD       • pantoprazole (PROTONIX) injection 40 mg  40 mg Intravenous Q AM Portillo Argueta MD   40 mg at 03/18/17 0523   • Pharmacy to dose vancomycin   Does not apply Continuous PRN Alban Vázquez MD       • potassium chloride (K-DUR) CR tablet 10 mEq  10 mEq Oral Daily Portillo Argueta MD   10 mEq at 03/18/17 0900   • predniSONE (DELTASONE) tablet 40 mg  40 mg Oral Q12H Alban Vázquez MD   40 mg at 03/18/17 0900   • promethazine (PHENERGAN) tablet 25 mg  25 mg Oral Q6H PRN Portillo Argueta MD       • sodium chloride 0.9 % flush 1-10 mL  1-10 mL Intravenous PRN Portillo Argueta MD   10 mL at 03/16/17 2216   • sodium chloride 0.9 % infusion  50 mL/hr Intravenous Continuous Alban Vázquez MD 50 mL/hr at 03/18/17 0235 50 mL/hr at 03/18/17 0235   • vancomycin (VANCOCIN) 500 mg in sodium chloride 0.9 % 100 mL IVPB  500 mg Intravenous Q8H Alban Vázquez MD           Objective     Physical Exam:     Vital Sign Min/Max for last 24 hours  Temp  Min: 96.6 °F (35.9 °C)  Max: 98.7 °F (37.1 °C)   BP  Min: 117/56  Max: 149/98   Pulse  Min: 80  Max: 97   Resp  Min: 18  Max: 20   SpO2  Min: 84 %  Max: 98 %   Flow (L/min)  Min: 3  Max: 3   Weight  Min: 279 lb 15.8 oz (127 kg)  Max: 279 lb 15.8 oz (127 kg)        Physical Exam:    Physical Exam   Constitutional: She is oriented to person, place, and time. She appears well-developed and well-nourished. No distress.   HENT:   Head: Normocephalic.   Right Ear: External ear normal.   Left Ear: External ear normal.   Nose: Nose normal.   Mouth/Throat: Oropharynx is clear and moist. No oropharyngeal exudate.   Eyes: Conjunctivae and EOM are normal. Pupils are equal, round, and reactive to light. Right eye exhibits no discharge. Left eye exhibits no discharge.   Neck: Normal range of motion.   Cardiovascular: Normal rate, regular rhythm and normal heart sounds.    Pulmonary/Chest: Effort normal and breath sounds normal. No respiratory distress. She has no wheezes. She has no rales.   Abdominal: Soft. Bowel sounds are normal. She exhibits no distension. There is no tenderness.   Musculoskeletal: Normal range of motion. She exhibits no edema, tenderness or deformity.   Neurological: She is alert and oriented to person, place, and time.   Skin: Skin is warm and dry. She is not diaphoretic.   Psychiatric: She has a normal mood and affect. Her behavior is normal.   Nursing note and vitals reviewed.           Results Review:     WBC WBC   Date Value Ref Range Status   03/18/2017 13.97 (H) 3.20 - 9.80 10*3/mm3 Final   03/17/2017 8.42 3.20 - 9.80 10*3/mm3 Final   03/16/2017 9.56 3.20 - 9.80 10*3/mm3 Final      HGB HEMOGLOBIN   Date Value Ref Range Status   03/18/2017 9.1 (L) 12.0 - 15.5 g/dL Final   03/17/2017 8.6 (L) 12.0 - 15.5 g/dL Final   03/16/2017 8.8 (L) 12.0 - 15.5 g/dL Final      HCT HEMATOCRIT   Date Value Ref Range Status   03/18/2017 29.3 (L) 35.0 - 45.0 % Final   03/17/2017 27.9 (L) 35.0 - 45.0 % Final   03/16/2017 28.3 (L) 35.0 - 45.0 % Final      Platlets PLATELETS   Date Value Ref Range Status   03/18/2017 339 150 - 450 10*3/mm3 Final   03/17/2017 261 150 - 450 10*3/mm3 Final   03/16/2017 249 150 - 450 10*3/mm3 Final      MCV MCV   Date Value Ref Range Status    03/18/2017 74.7 (L) 80.0 - 98.0 fL Final   03/17/2017 75.0 (L) 80.0 - 98.0 fL Final   03/16/2017 75.7 (L) 80.0 - 98.0 fL Final            GLUCOSE   Date Value Ref Range Status   03/18/2017 357 (H) 60 - 100 mg/dL Final     SODIUM   Date Value Ref Range Status   03/18/2017 139 137 - 145 mmol/L Final     POTASSIUM   Date Value Ref Range Status   03/18/2017 4.7 3.5 - 5.1 mmol/L Final     CO2   Date Value Ref Range Status   03/18/2017 30.0 22.0 - 31.0 mmol/L Final     CHLORIDE   Date Value Ref Range Status   03/18/2017 96 95 - 110 mmol/L Final     ANION GAP   Date Value Ref Range Status   03/18/2017 13.0 5.0 - 15.0 mmol/L Final     CREATININE   Date Value Ref Range Status   03/18/2017 0.93 0.50 - 1.00 mg/dL Final     BUN   Date Value Ref Range Status   03/18/2017 30 (H) 7 - 21 mg/dL Final     BUN/CREATININE RATIO   Date Value Ref Range Status   03/18/2017 32.3 (H) 7.0 - 25.0 Final     CALCIUM   Date Value Ref Range Status   03/18/2017 8.8 8.4 - 10.2 mg/dL Final     EGFR NON  AMER   Date Value Ref Range Status   03/18/2017 62 >60 mL/min/1.73 Final     ALKALINE PHOSPHATASE   Date Value Ref Range Status   03/18/2017 165 (H) 38 - 126 U/L Final     TOTAL PROTEIN   Date Value Ref Range Status   03/18/2017 7.3 6.3 - 8.6 g/dL Final     ALT (SGPT)   Date Value Ref Range Status   03/18/2017 17 9 - 52 U/L Final     AST (SGOT)   Date Value Ref Range Status   03/18/2017 32 14 - 36 U/L Final     TOTAL BILIRUBIN   Date Value Ref Range Status   03/18/2017 0.3 0.2 - 1.3 mg/dL Final     ALBUMIN   Date Value Ref Range Status   03/18/2017 3.40 3.40 - 4.80 g/dL Final     GLOBULIN   Date Value Ref Range Status   03/18/2017 3.9 (H) 2.3 - 3.5 gm/dL Final     A/G RATIO   Date Value Ref Range Status   03/18/2017 0.9 (L) 1.1 - 1.8 g/dL Final       BLOOD CULTURE   Date Value Ref Range Status   03/16/2017 No growth at 24 hours  Preliminary   03/16/2017 No growth at 24 hours  Preliminary        URINE CULTURE   Date Value Ref Range Status    03/16/2017 No growth at 24 hours  Final       Imaging Results (last 24 hours)     ** No results found for the last 24 hours. **           I reviewed the patient's new clinical results.  I reviewed the patient's new imaging results and agree with the interpretation.     ASSESSMENT/PLAN:   Assessment/Plan   Active Hospital Problems (** Indicates Principal Problem)    Diagnosis Date Noted   • **Acute respiratory failure with hypoxia [J96.01] 03/16/2017     Oxygen supplementation, ABG, duo-nebs  Taper steroid to prednisone 40mg BID.      • Sepsis due to pneumonia [J18.9, A41.9] 03/17/2017     - Azithromycin 500 mg IV daily, and Ceftriaxone 1 g IV daily, DuoNebs  - Sputum culture - pending. mycoplasma PCR -pending;   - Legionella ag - negative, pneumococcal ag - negative.  - Lactic acid 3.1 on admission, repeat 2.0; now 1.2  - CRP 28.30, trend CRP  - Incentive spirometry, supplemental oxygen PRN     • Laryngitis, acute [J04.0] 03/17/2017   • Pneumonia of both lungs due to methicillin resistant Staphylococcus aureus (MRSA) [J15.212] 03/16/2017     -Stop azithromycin since atypical are negative Ceftriaxone 1 g IV daily today is day 3. Will add vancomycin for MRSA coverage, DuoNebs  - Sputum culture -show MRSA, mycoplasma PCR -pending; Legionella ag - negative, pneumococcal ag - negative.  - Lactic acid 3.1 on admission, repeat 2.0; now 1.2  - CRP 28.30, trending down today is 12.   - Incentive spirometry, supplemental oxygen PRN     • Prophylactic hospital isolation [Z41.8] 03/16/2017     History of CRE infection continue contact precautions     • Diabetes mellitus type 2 in obese [E11.9, E66.9] 08/24/2016     - Increase night time Levemir to 95u qhs;  Levemir 90 units qAM, novolog SSI  -will add meal time insulin which she takes at home.        • Chronic obstructive pulmonary disease [J44.9] 08/24/2016     Duo Nebs, Symbicort, oxygen supplementation     • Tobacco dependence syndrome [F17.200]      1/2ppd  - Nicotine  patch ordered     • Sleep apnea [G47.30]      CPAP ordered     • Dyslipidemia [E78.5]      Home atorvastatin     • Morbid obesity due to excess calories [E66.01]      BMI 44.5, dietary consult ordered     • Essential hypertension [I10]      Home lisinopril and metoprolol, will administer IV lasix 80 mg today, 40 mg BID thereafter     • Asthma [J45.909]    • GERD (gastroesophageal reflux disease) [K21.9]      Protonix     • Peripheral vascular disease [I73.9]    • Coronary arteriosclerosis [I25.10]      S/p CABG X3 in 2013, continue home Plavix, Metoprolol, and Atorvastatin        Resolved Hospital Problems    Diagnosis Date Noted Date Resolved   No resolved problems to display.     DVT ppx: lovenox       I discussed the patients findings and my recommendations with patient.        Time: 27 mins           This document has been electronically signed by Alban Vázquez MD on March 18, 2017 9:15 AM

## 2017-03-18 NOTE — PROGRESS NOTES
"Pharmacokinetics by Pharmacy - Vancomycin    Yamileth Slater is a 57 y.o. female   [Ht: 62.01\" (157.5 cm); Wt: 279 lb 15.8 oz (127 kg)]    Estimated Creatinine Clearance: 84.2 mL/min (by C-G formula based on Cr of 0.93).   Lab Results   Component Value Date    CREATININE 0.93 03/18/2017    CREATININE 0.85 03/17/2017    CREATININE 0.85 03/16/2017    CREATININE 0.9 12/14/2016    CREATININE 0.9 12/13/2016    CREATININE 1.3 (H) 12/12/2016      Lab Results   Component Value Date    WBC 13.97 (H) 03/18/2017    WBC 8.42 03/17/2017    WBC 9.56 03/16/2017      Temp Readings from Last 1 Encounters:   03/18/17 96.6 °F (35.9 °C) (Axillary)       Indication for use: MRSA Pneumonia     Baseline culture results:  Microbiology Results (last 10 days)       Procedure Component Value - Date/Time      Respiratory Culture [[id number]]  (Abnormal) Collected:  03/17/17 1130    Lab Status:  Preliminary result Specimen:  Sputum from Cough Updated:  03/18/17 0840     Respiratory Culture        Moderate growth (3+) Staphylococcus aureus, MRSA (C)      droplet precautions requested  contact precautions requested  Methicillin resistant Staphylococcus aureus, Patient may be an isolation risk.        Gram Stain Result Moderate (3+) WBCs seen              Rare (1+) Epithelial cells per low power field      Mixed bacterial nataly     Mycoplasma Pneumoniae PCR [[id number]] Collected:  03/17/17 0631    Lab Status:  Final result Specimen:  Sputum from Nasopharynx Updated:  03/17/17 0958     MYCOPLASMAE PNEUMONIAE BY PCR Negative     Narrative:       This test is performed by utilizing real time polymerace chain recation (PCR).   This test is performed by utilizing real time polymerace chain recation (PCR).     S. Pneumo Ag Urine or CSF [[id number]]  (Normal) Collected:  03/16/17 2147    Lab Status:  Final result Specimen:  Urine from Urine, Clean Catch Updated:  03/16/17 2240     Strep Pneumo Ag Negative     Urine Culture [[id number]]  (Normal) Collected: "  03/16/17 2147    Lab Status:  Final result Specimen:  Urine from Urine, Clean Catch Updated:  03/17/17 1710     Urine Culture No growth at 24 hours     Blood Culture [[id number]]  (Normal) Collected:  03/16/17 2000    Lab Status:  Preliminary result Specimen:  Blood from Hand, Right Updated:  03/17/17 2101     Blood Culture No growth at 24 hours     Blood Culture [[id number]]  (Normal) Collected:  03/16/17 1950    Lab Status:  Preliminary result Specimen:  Blood from Arm, Right Updated:  03/17/17 2101     Blood Culture No growth at 24 hours     Influenza Antigen [[id number]]  (Normal) Collected:  03/16/17 1204    Lab Status:  Final result Specimen:  Swab from Nasopharynx Updated:  03/16/17 1226     Influenza A Ag, EIA Negative      Influenza B Ag, EIA Negative           RESPIRATORY CULTURE   Date Value Ref Range Status   03/17/2017 Moderate growth (3+) Staphylococcus aureus, MRSA (C)  Preliminary     Comment:     droplet precautions requested  contact precautions requested  Methicillin resistant Staphylococcus aureus, Patient may be an isolation risk.         Assessment/Plan  Initiated Vancomycin 2000 mg IVPB Q18H. Will order Vancomycin trough level 3/20. Goal Trough will be 15-20 mcg/ml.  harmacy will monitor renal function and adjust dose accordingly.    Serjio Aguilar [facility]  03/18/17 9:31 AM

## 2017-03-18 NOTE — PROGRESS NOTES
"FAMILY MEDICINE PROGRESS NOTE  NAME: Yamileth Slater  : 1960  MRN: [id number]     LOS: 2 days   Full Code  PROVIDER OF SERVICE:     Chief Complaint:  Pneumonia of both lungs due to methicillin resistant Staphylococcus aureus (MRSA)    Subjective     Interval History:  History taken from: patient  Subjective: Patient is a 56 yo  female who was admitted for sepsis secondary to pneumonia.  Patient also has laryngitis.  She states she feels like \"shit.\"  Breathing a little better still tight on the right side.    Review of Systems:   Review of Systems   Constitutional: Negative for activity change, appetite change, chills and fever.   HENT: Positive for sore throat and voice change. Negative for congestion and trouble swallowing.    Eyes: Negative for photophobia and visual disturbance.   Respiratory: Positive for cough, shortness of breath (much improved since admission) and wheezing.    Cardiovascular: Positive for leg swelling. Negative for chest pain and palpitations.   Gastrointestinal: Negative for abdominal pain, constipation, diarrhea, nausea and vomiting.   Endocrine: Negative for polydipsia and polyphagia.   Genitourinary: Negative for difficulty urinating, dysuria and frequency.   Musculoskeletal: Negative for arthralgias and gait problem.   Skin: Positive for wound (complains of abscess in groin). Negative for color change, pallor and rash.   Neurological: Negative for weakness, light-headedness and headaches.   Psychiatric/Behavioral: Negative for sleep disturbance. The patient is not nervous/anxious.        Objective     Vital Signs  Temp:  [96.3 °F (35.7 °C)-98.7 °F (37.1 °C)] 96.3 °F (35.7 °C)  Heart Rate:  [73-97] 73  Resp:  [18-20] 18  BP: (117-151)/(56-98) 151/89    Physical Exam  Physical Exam   Constitutional: She is oriented to person, place, and time. She appears well-developed and well-nourished. No distress.   HENT:   Voice hoarse   Cardiovascular: Normal rate, regular " rhythm and intact distal pulses.  Exam reveals no gallop and no friction rub.    Murmur heard.  Pulmonary/Chest: Effort normal. No respiratory distress. She has wheezes. She has no rales.   Diminished breath sounds in the bases   Abdominal: Soft. Bowel sounds are normal. She exhibits no distension. There is no tenderness. There is no rebound and no guarding.   Musculoskeletal: She exhibits edema.   Neurological: She is alert and oriented to person, place, and time.   Skin: She is not diaphoretic.   Psychiatric: She has a normal mood and affect. Her behavior is normal. Judgment and thought content normal.   Nursing note and vitals reviewed.      Medication Review    Current Facility-Administered Medications:   •  acetaminophen (TYLENOL) tablet 650 mg, 650 mg, Oral, Q4H PRN, Portillo Argueta MD  •  atorvastatin (LIPITOR) tablet 20 mg, 20 mg, Oral, Daily, Portillo Argueta MD, 20 mg at 03/18/17 0857  •  AZITHROMYCIN 500 MG/250 ML 0.9% NS IVPB (vial-mate), 500 mg, Intravenous, Q24H, Portillo Argueta MD, 500 mg at 03/17/17 1700  •  bisacodyl (DULCOLAX) EC tablet 5 mg, 5 mg, Oral, Daily PRN, Portillo Argueta MD  •  bisacodyl (DULCOLAX) suppository 10 mg, 10 mg, Rectal, Daily PRN, Portillo Argueta MD  •  budesonide-formoterol (SYMBICORT) 160-4.5 MCG/ACT inhaler 2 puff, 2 puff, Inhalation, BID - RT, Portillo Argueta MD, 2 puff at 03/18/17 0746  •  cefTRIAXone (ROCEPHIN) 1 g/100 mL 0.9% NS (MBP), 1 g, Intravenous, Q24H, Portillo Argueta MD, 1 g at 03/18/17 0858  •  cetirizine (zyrTEC) tablet 10 mg, 10 mg, Oral, Daily, Portillo Argueta MD, 10 mg at 03/18/17 0858  •  clopidogrel (PLAVIX) tablet 75 mg, 75 mg, Oral, Daily, Portillo Argueta MD, 75 mg at 03/18/17 0857  •  cyclobenzaprine (FLEXERIL) tablet 10 mg, 10 mg, Oral, BID, Portillo Argueta MD, 10 mg at 03/18/17 0857  •  dextrose (D50W) solution 25 g, 25 g, Intravenous, Q15 Min PRN, Portillo Argueta MD  •  dextrose (GLUTOSE) oral gel 15 g, 15 g, Oral, Q15 Min PRN, Portillo Argueta MD  •  enoxaparin  (LOVENOX) syringe 40 mg, 40 mg, Subcutaneous, Q24H, Yadira Delarosa MD, 40 mg at 03/18/17 0858  •  furosemide (LASIX) injection 40 mg, 40 mg, Intravenous, Daily, Portillo Argueta MD, 40 mg at 03/18/17 0859  •  furosemide (LASIX) injection 80 mg, 80 mg, Intravenous, Once, Portillo Argueta MD, 80 mg at 03/16/17 2207  •  gabapentin (NEURONTIN) capsule 800 mg, 800 mg, Oral, Q8H, Portillo Argueta MD, 800 mg at 03/18/17 0523  •  glucagon (human recombinant) (GLUCAGEN DIAGNOSTIC) injection 1 mg, 1 mg, Subcutaneous, Q15 Min PRN, Portillo Argueta MD  •  insulin aspart (novoLOG) injection 0-24 Units, 0-24 Units, Subcutaneous, 4x Daily AC & at Bedtime, Portillo Argueta MD, 20 Units at 03/18/17 1220  •  insulin aspart (novoLOG) injection 30 Units, 30 Units, Subcutaneous, TID With Meals, Alban Vázquez MD, 30 Units at 03/18/17 1220  •  insulin detemir (LEVEMIR) injection 95 Units, 95 Units, Subcutaneous, Nightly, Yadira Delarosa MD, 95 Units at 03/17/17 2139  •  insulin detemir (LEVEMIR) injection 95 Units, 95 Units, Subcutaneous, QAM, Yadira Delarosa MD, 95 Units at 03/18/17 0606  •  ipratropium-albuterol (DUO-NEB) nebulizer solution 3 mL, 3 mL, Nebulization, 4x Daily - RT, Portillo Argueta MD, 3 mL at 03/18/17 1023  •  lisinopril (PRINIVIL,ZESTRIL) tablet 10 mg, 10 mg, Oral, Q24H, Portillo Argueta MD, 10 mg at 03/18/17 0857  •  metoprolol tartrate (LOPRESSOR) tablet 50 mg, 50 mg, Oral, BID, Portillo Argueta MD, 50 mg at 03/18/17 0857  •  nicotine (NICODERM CQ) 14 MG/24HR patch 1 patch, 1 patch, Transdermal, Q24H, Portillo Argueta MD, 1 patch at 03/17/17 3082  •  nitroglycerin (NITROSTAT) SL tablet 0.4 mg, 0.4 mg, Sublingual, PRN, Portillo Argueta MD  •  ondansetron (ZOFRAN) injection 4 mg, 4 mg, Intravenous, Q6H PRN, Portillo Argueta MD  •  pantoprazole (PROTONIX) injection 40 mg, 40 mg, Intravenous, Q AM, Portillo Argueta MD, 40 mg at 03/18/17 0369  •  Pharmacy to dose vancomycin, , Does not apply, Continuous PRN, Alban Vázquez,  MD  •  potassium chloride (K-DUR) CR tablet 10 mEq, 10 mEq, Oral, Daily, Portillo Argueta MD, 10 mEq at 03/18/17 0900  •  predniSONE (DELTASONE) tablet 40 mg, 40 mg, Oral, Q12H, Alban Vázquez MD, 40 mg at 03/18/17 0900  •  promethazine (PHENERGAN) tablet 25 mg, 25 mg, Oral, Q6H PRN, Portillo Argueta MD  •  sodium chloride 0.9 % flush 1-10 mL, 1-10 mL, Intravenous, PRN, Portillo Argueta MD, 10 mL at 03/16/17 2216  •  sodium chloride 0.9 % infusion, 50 mL/hr, Intravenous, Continuous, Alban Vázquez MD, Last Rate: 50 mL/hr at 03/18/17 0235, 50 mL/hr at 03/18/17 0235  •  [START ON 3/19/2017] vancomycin (VANCOCIN) 2,000 mg in sodium chloride 0.9 % 500 mL IVPB, 15 mg/kg, Intravenous, Q18H, Alban Vázquez MD     Diagnostic Data      I reviewed the patient's new clinical results and imaging.      Assessment/Plan     Active Hospital Problems (** Indicates Principal Problem)    Diagnosis Date Noted   • **Pneumonia of both lungs due to methicillin resistant Staphylococcus aureus (MRSA) [J15.212] 03/16/2017     -Stop azithromycin since atypical are negative Ceftriaxone 1 g IV daily today is day 3. Will add vancomycin for MRSA coverage, DuoNebs  - Sputum culture -show MRSA, mycoplasma PCR -pending; Legionella ag - negative, pneumococcal ag - negative.  - Lactic acid 3.1 on admission, repeat 2.0; now 1.2  - CRP 28.30, trending down today is 12.   - Incentive spirometry, supplemental oxygen PRN     • Sepsis due to pneumonia [J18.9, A41.9] 03/17/2017     - Azithromycin 500 mg IV daily, and Ceftriaxone 1 g IV daily, DuoNebs  - Sputum culture - pending. mycoplasma PCR -pending;   - Legionella ag - negative, pneumococcal ag - negative.  - Lactic acid 3.1 on admission, repeat 2.0; now 1.2  - CRP 28.30, trend CRP  - Incentive spirometry, supplemental oxygen PRN     • Laryngitis, acute [J04.0] 03/17/2017   • Acute respiratory failure with hypoxia [J96.01] 03/16/2017     Oxygen supplementation, ABG, duo-nebs  Taper steroid to prednisone 40mg BID.       • Prophylactic hospital isolation [Z41.8] 03/16/2017     History of CRE infection continue contact precautions     • Diabetes mellitus type 2 in obese [E11.9, E66.9] 08/24/2016     - Increase night time Levemir to 95u qhs;  Levemir 90 units qAM, novolog SSI  -will add meal time insulin which she takes at home.        • Chronic obstructive pulmonary disease [J44.9] 08/24/2016     Duo Nebs, Symbicort, oxygen supplementation     • Tobacco dependence syndrome [F17.200]      1/2ppd  - Nicotine patch ordered     • Sleep apnea [G47.30]      CPAP ordered     • Dyslipidemia [E78.5]      Home atorvastatin     • Morbid obesity due to excess calories [E66.01]      BMI 44.5, dietary consult ordered     • Essential hypertension [I10]      Home lisinopril and metoprolol, will administer IV lasix 80 mg today, 40 mg BID thereafter     • Asthma [J45.909]    • GERD (gastroesophageal reflux disease) [K21.9]      Protonix     • Peripheral vascular disease [I73.9]    • Coronary arteriosclerosis [I25.10]      S/p CABG X3 in 2013, continue home Plavix, Metoprolol, and Atorvastatin        Resolved Hospital Problems    Diagnosis Date Noted Date Resolved   No resolved problems to display.         DVT prophylaxis: Heparin      Disposition:Home with home health          This document has been electronically signed by Charlette Bowden MD on March 18, 2017 2:01 PM          This document has been electronically signed by Charlette Bowden MD on March 18, 2017 2:01 PM

## 2017-03-18 NOTE — PLAN OF CARE
Problem: Patient Care Overview (Adult)  Goal: Plan of Care Review  Outcome: Ongoing (interventions implemented as appropriate)  Pt resting in bed this shift    03/18/17 0401   Coping/Psychosocial Response Interventions   Plan Of Care Reviewed With patient

## 2017-03-19 LAB
ALBUMIN SERPL-MCNC: 3.3 G/DL (ref 3.4–4.8)
ALBUMIN/GLOB SERPL: 0.9 G/DL (ref 1.1–1.8)
ALP SERPL-CCNC: 135 U/L (ref 38–126)
ALT SERPL W P-5'-P-CCNC: 32 U/L (ref 9–52)
ANION GAP SERPL CALCULATED.3IONS-SCNC: 12 MMOL/L (ref 5–15)
AST SERPL-CCNC: 54 U/L (ref 14–36)
BACTERIA SPEC RESP CULT: ABNORMAL
BASOPHILS # BLD AUTO: 0.02 10*3/MM3 (ref 0–0.2)
BASOPHILS NFR BLD AUTO: 0.2 % (ref 0–2)
BILIRUB SERPL-MCNC: 0.3 MG/DL (ref 0.2–1.3)
BUN BLD-MCNC: 28 MG/DL (ref 7–21)
BUN/CREAT SERPL: 34.6 (ref 7–25)
CALCIUM SPEC-SCNC: 8.2 MG/DL (ref 8.4–10.2)
CHLORIDE SERPL-SCNC: 103 MMOL/L (ref 95–110)
CO2 SERPL-SCNC: 27 MMOL/L (ref 22–31)
CREAT BLD-MCNC: 0.81 MG/DL (ref 0.5–1)
DEPRECATED RDW RBC AUTO: 48.6 FL (ref 36.4–46.3)
EOSINOPHIL # BLD AUTO: 0.02 10*3/MM3 (ref 0–0.7)
EOSINOPHIL NFR BLD AUTO: 0.2 % (ref 0–7)
ERYTHROCYTE [DISTWIDTH] IN BLOOD BY AUTOMATED COUNT: 17.6 % (ref 11.5–14.5)
GFR SERPL CREATININE-BSD FRML MDRD: 73 ML/MIN/1.73 (ref 60–120)
GLOBULIN UR ELPH-MCNC: 3.6 GM/DL (ref 2.3–3.5)
GLUCOSE BLD-MCNC: 117 MG/DL (ref 60–100)
GLUCOSE BLDC GLUCOMTR-MCNC: 124 MG/DL (ref 70–130)
GRAM STN SPEC: ABNORMAL
HCT VFR BLD AUTO: 29.9 % (ref 35–45)
HGB BLD-MCNC: 9.2 G/DL (ref 12–15.5)
IMM GRANULOCYTES # BLD: 0.38 10*3/MM3 (ref 0–0.02)
IMM GRANULOCYTES NFR BLD: 3.7 % (ref 0–0.5)
LYMPHOCYTES # BLD AUTO: 2.45 10*3/MM3 (ref 0.6–4.2)
LYMPHOCYTES NFR BLD AUTO: 23.9 % (ref 10–50)
MCH RBC QN AUTO: 23.4 PG (ref 26.5–34)
MCHC RBC AUTO-ENTMCNC: 30.8 G/DL (ref 31.4–36)
MCV RBC AUTO: 76.1 FL (ref 80–98)
MONOCYTES # BLD AUTO: 0.54 10*3/MM3 (ref 0–0.9)
MONOCYTES NFR BLD AUTO: 5.3 % (ref 0–12)
NEUTROPHILS # BLD AUTO: 6.84 10*3/MM3 (ref 2–8.6)
NEUTROPHILS NFR BLD AUTO: 66.7 % (ref 37–80)
PLATELET # BLD AUTO: 332 10*3/MM3 (ref 150–450)
PMV BLD AUTO: 9.1 FL (ref 8–12)
POTASSIUM BLD-SCNC: 4.5 MMOL/L (ref 3.5–5.1)
PROT SERPL-MCNC: 6.9 G/DL (ref 6.3–8.6)
RBC # BLD AUTO: 3.93 10*6/MM3 (ref 3.77–5.16)
SODIUM BLD-SCNC: 142 MMOL/L (ref 137–145)
WBC NRBC COR # BLD: 10.25 10*3/MM3 (ref 3.2–9.8)

## 2017-03-19 PROCEDURE — 94799 UNLISTED PULMONARY SVC/PX: CPT

## 2017-03-19 PROCEDURE — 63710000001 INSULIN ASPART PER 5 UNITS: Performed by: FAMILY MEDICINE

## 2017-03-19 PROCEDURE — 25010000002 ENOXAPARIN PER 10 MG: Performed by: FAMILY MEDICINE

## 2017-03-19 PROCEDURE — 99232 SBSQ HOSP IP/OBS MODERATE 35: CPT | Performed by: FAMILY MEDICINE

## 2017-03-19 PROCEDURE — 25010000002 AZITHROMYCIN: Performed by: FAMILY MEDICINE

## 2017-03-19 PROCEDURE — 25010000002 VANCOMYCIN PER 500 MG: Performed by: FAMILY MEDICINE

## 2017-03-19 PROCEDURE — 85025 COMPLETE CBC W/AUTO DIFF WBC: CPT | Performed by: FAMILY MEDICINE

## 2017-03-19 PROCEDURE — 63710000001 PREDNISONE PER 5 MG: Performed by: FAMILY MEDICINE

## 2017-03-19 PROCEDURE — 63710000001 INSULIN DETEMIR PER 5 UNITS: Performed by: FAMILY MEDICINE

## 2017-03-19 PROCEDURE — 25010000002 CEFTRIAXONE: Performed by: FAMILY MEDICINE

## 2017-03-19 PROCEDURE — 94760 N-INVAS EAR/PLS OXIMETRY 1: CPT

## 2017-03-19 PROCEDURE — 82962 GLUCOSE BLOOD TEST: CPT

## 2017-03-19 PROCEDURE — 80053 COMPREHEN METABOLIC PANEL: CPT | Performed by: FAMILY MEDICINE

## 2017-03-19 RX ORDER — FUROSEMIDE 40 MG/1
40 TABLET ORAL DAILY
Status: DISCONTINUED | OUTPATIENT
Start: 2017-03-19 | End: 2017-03-21 | Stop reason: HOSPADM

## 2017-03-19 RX ADMIN — IPRATROPIUM BROMIDE AND ALBUTEROL SULFATE 3 ML: 2.5; .5 SOLUTION RESPIRATORY (INHALATION) at 15:00

## 2017-03-19 RX ADMIN — CYCLOBENZAPRINE HYDROCHLORIDE 10 MG: 10 TABLET, FILM COATED ORAL at 17:26

## 2017-03-19 RX ADMIN — PANTOPRAZOLE SODIUM 40 MG: 40 INJECTION, POWDER, FOR SOLUTION INTRAVENOUS at 06:07

## 2017-03-19 RX ADMIN — INSULIN ASPART 30 UNITS: 100 INJECTION, SOLUTION INTRAVENOUS; SUBCUTANEOUS at 17:26

## 2017-03-19 RX ADMIN — IPRATROPIUM BROMIDE AND ALBUTEROL SULFATE 3 ML: 2.5; .5 SOLUTION RESPIRATORY (INHALATION) at 11:26

## 2017-03-19 RX ADMIN — INSULIN ASPART 20 UNITS: 100 INJECTION, SOLUTION INTRAVENOUS; SUBCUTANEOUS at 16:57

## 2017-03-19 RX ADMIN — INSULIN ASPART 30 UNITS: 100 INJECTION, SOLUTION INTRAVENOUS; SUBCUTANEOUS at 11:44

## 2017-03-19 RX ADMIN — PREDNISONE 40 MG: 20 TABLET ORAL at 20:09

## 2017-03-19 RX ADMIN — GABAPENTIN 800 MG: 400 CAPSULE ORAL at 06:07

## 2017-03-19 RX ADMIN — AZITHROMYCIN 500 MG: 500 INJECTION, POWDER, LYOPHILIZED, FOR SOLUTION INTRAVENOUS at 17:00

## 2017-03-19 RX ADMIN — POTASSIUM CHLORIDE 10 MEQ: 750 TABLET, FILM COATED, EXTENDED RELEASE ORAL at 09:09

## 2017-03-19 RX ADMIN — INSULIN ASPART 16 UNITS: 100 INJECTION, SOLUTION INTRAVENOUS; SUBCUTANEOUS at 21:12

## 2017-03-19 RX ADMIN — CEFTRIAXONE 1 G: 1 INJECTION, POWDER, FOR SOLUTION INTRAMUSCULAR; INTRAVENOUS at 09:06

## 2017-03-19 RX ADMIN — ATORVASTATIN CALCIUM 20 MG: 40 TABLET, FILM COATED ORAL at 09:06

## 2017-03-19 RX ADMIN — INSULIN ASPART 30 UNITS: 100 INJECTION, SOLUTION INTRAVENOUS; SUBCUTANEOUS at 09:08

## 2017-03-19 RX ADMIN — LISINOPRIL 10 MG: 10 TABLET ORAL at 09:07

## 2017-03-19 RX ADMIN — PREDNISONE 40 MG: 20 TABLET ORAL at 09:06

## 2017-03-19 RX ADMIN — GABAPENTIN 800 MG: 400 CAPSULE ORAL at 21:11

## 2017-03-19 RX ADMIN — IPRATROPIUM BROMIDE AND ALBUTEROL SULFATE 3 ML: 2.5; .5 SOLUTION RESPIRATORY (INHALATION) at 18:28

## 2017-03-19 RX ADMIN — CYCLOBENZAPRINE HYDROCHLORIDE 10 MG: 10 TABLET, FILM COATED ORAL at 09:06

## 2017-03-19 RX ADMIN — INSULIN ASPART 8 UNITS: 100 INJECTION, SOLUTION INTRAVENOUS; SUBCUTANEOUS at 11:44

## 2017-03-19 RX ADMIN — ENOXAPARIN SODIUM 40 MG: 40 INJECTION SUBCUTANEOUS at 09:07

## 2017-03-19 RX ADMIN — INSULIN DETEMIR 95 UNITS: 100 INJECTION, SOLUTION SUBCUTANEOUS at 09:06

## 2017-03-19 RX ADMIN — CETIRIZINE HYDROCHLORIDE 10 MG: 10 TABLET, FILM COATED ORAL at 09:07

## 2017-03-19 RX ADMIN — METOPROLOL TARTRATE 50 MG: 50 TABLET ORAL at 09:06

## 2017-03-19 RX ADMIN — NICOTINE 1 PATCH: 14 PATCH, EXTENDED RELEASE TRANSDERMAL at 20:08

## 2017-03-19 RX ADMIN — IPRATROPIUM BROMIDE AND ALBUTEROL SULFATE 3 ML: 2.5; .5 SOLUTION RESPIRATORY (INHALATION) at 07:45

## 2017-03-19 RX ADMIN — CLOPIDOGREL BISULFATE 75 MG: 75 TABLET ORAL at 09:06

## 2017-03-19 RX ADMIN — BUDESONIDE AND FORMOTEROL FUMARATE DIHYDRATE 2 PUFF: 160; 4.5 AEROSOL RESPIRATORY (INHALATION) at 18:29

## 2017-03-19 RX ADMIN — VANCOMYCIN HYDROCHLORIDE 2000 MG: 1 INJECTION, POWDER, LYOPHILIZED, FOR SOLUTION INTRAVENOUS at 21:11

## 2017-03-19 RX ADMIN — METOPROLOL TARTRATE 50 MG: 50 TABLET ORAL at 17:26

## 2017-03-19 RX ADMIN — NICOTINE 1 PATCH: 14 PATCH, EXTENDED RELEASE TRANSDERMAL at 21:05

## 2017-03-19 RX ADMIN — VANCOMYCIN HYDROCHLORIDE 2000 MG: 1 INJECTION, POWDER, LYOPHILIZED, FOR SOLUTION INTRAVENOUS at 04:38

## 2017-03-19 RX ADMIN — GABAPENTIN 800 MG: 400 CAPSULE ORAL at 13:39

## 2017-03-19 RX ADMIN — FUROSEMIDE 40 MG: 40 TABLET ORAL at 09:54

## 2017-03-19 RX ADMIN — INSULIN DETEMIR 95 UNITS: 100 INJECTION, SOLUTION SUBCUTANEOUS at 20:09

## 2017-03-19 RX ADMIN — BUDESONIDE AND FORMOTEROL FUMARATE DIHYDRATE 2 PUFF: 160; 4.5 AEROSOL RESPIRATORY (INHALATION) at 07:45

## 2017-03-19 NOTE — PROGRESS NOTES
[address] [address]  T - [id number]     PROGRESS NOTE         SUBJECTIVE:   Patient Care Team:  Yadira Delarosa MD as PCP - General (Family Medicine)  Jagdish Baez MD as Resident (Family Medicine)  Aguilar Collins MD as Resident (Family Medicine)  Ranjan Nicolas MD as Resident (Family Medicine)  Aracely Chong MD as Resident (Family Medicine)  Edgard Spencer MD as Resident (Family Medicine)  Maira Lim MA as Medical Assistant    Chief Complaint:     Chief Complaint   Patient presents with   • Cough   • Sore Throat   • Shortness of Breath   • Abscess       Subjective     Patient is 57 y.o. female presents with shortness of air, congestion. Sputum is positive for MRSA, started on vancomycin yesterday doing well.      ROS/HISTORY/ CURRENT MEDICATIONS/OBJECTIVE/VS/PE:   Review of Systems:   Review of Systems   Constitutional: Negative for activity change, appetite change, fatigue and fever.   HENT: Negative for ear pain and sore throat.    Eyes: Negative for pain and visual disturbance.   Respiratory: Positive for cough, shortness of breath and wheezing.    Cardiovascular: Negative for chest pain and palpitations.   Gastrointestinal: Negative for abdominal pain and nausea.   Endocrine: Negative for cold intolerance and heat intolerance.   Genitourinary: Negative for difficulty urinating and dysuria.   Musculoskeletal: Negative for arthralgias and gait problem.   Skin: Negative for color change and rash.   Neurological: Negative for dizziness, weakness and headaches.   Hematological: Negative for adenopathy. Does not bruise/bleed easily.   Psychiatric/Behavioral: Negative for agitation, confusion and sleep disturbance.         Current Medications:     Current Facility-Administered Medications   Medication Dose Route Frequency Provider Last Rate Last Dose   • acetaminophen (TYLENOL) tablet 650 mg  650 mg Oral Q4H PRN Portillo Argueta MD        • atorvastatin (LIPITOR) tablet 20 mg  20 mg Oral Daily Portillo Argueta MD   20 mg at 03/18/17 0857   • AZITHROMYCIN 500 MG/250 ML 0.9% NS IVPB (vial-mate)  500 mg Intravenous Q24H Portillo Argueta MD   500 mg at 03/18/17 1815   • bisacodyl (DULCOLAX) EC tablet 5 mg  5 mg Oral Daily PRN Portillo Argueta MD       • bisacodyl (DULCOLAX) suppository 10 mg  10 mg Rectal Daily PRN Portillo Argueta MD       • budesonide-formoterol (SYMBICORT) 160-4.5 MCG/ACT inhaler 2 puff  2 puff Inhalation BID - RT Portillo Argueta MD   2 puff at 03/19/17 0745   • cefTRIAXone (ROCEPHIN) 1 g/100 mL 0.9% NS (MBP)  1 g Intravenous Q24H Portillo Argueta MD   1 g at 03/18/17 0858   • cetirizine (zyrTEC) tablet 10 mg  10 mg Oral Daily Portillo Argueta MD   10 mg at 03/18/17 0858   • clopidogrel (PLAVIX) tablet 75 mg  75 mg Oral Daily Portillo Argueta MD   75 mg at 03/18/17 0857   • cyclobenzaprine (FLEXERIL) tablet 10 mg  10 mg Oral BID Portillo Argueta MD   10 mg at 03/18/17 1815   • dextrose (D50W) solution 25 g  25 g Intravenous Q15 Min PRN Portillo Argueta MD       • dextrose (GLUTOSE) oral gel 15 g  15 g Oral Q15 Min PRN Portillo Argueta MD       • enoxaparin (LOVENOX) syringe 40 mg  40 mg Subcutaneous Q24H Yadira MD Isaura   40 mg at 03/18/17 0858   • furosemide (LASIX) tablet 40 mg  40 mg Oral Daily Alban Vázquez MD       • gabapentin (NEURONTIN) capsule 800 mg  800 mg Oral Q8H Portillo Argueta MD   800 mg at 03/19/17 0607   • glucagon (human recombinant) (GLUCAGEN DIAGNOSTIC) injection 1 mg  1 mg Subcutaneous Q15 Min PRN Portillo Argueta MD       • insulin aspart (novoLOG) injection 0-24 Units  0-24 Units Subcutaneous 4x Daily AC & at Bedtime Portillo Argueta MD   4 Units at 03/18/17 2230   • insulin aspart (novoLOG) injection 30 Units  30 Units Subcutaneous TID With Meals Alban Vázquez MD   30 Units at 03/18/17 1815   • insulin detemir (LEVEMIR) injection 95 Units  95 Units Subcutaneous Nightly Yadira Delarosa MD   95 Units at 03/18/17 2120    • insulin detemir (LEVEMIR) injection 95 Units  95 Units Subcutaneous QAM Yadira Delarosa MD   95 Units at 03/18/17 0606   • ipratropium-albuterol (DUO-NEB) nebulizer solution 3 mL  3 mL Nebulization 4x Daily - RT Portillo Argueta MD   3 mL at 03/19/17 0745   • lisinopril (PRINIVIL,ZESTRIL) tablet 10 mg  10 mg Oral Q24H Portillo Argueta MD   10 mg at 03/18/17 0857   • metoprolol tartrate (LOPRESSOR) tablet 50 mg  50 mg Oral BID Portillo Argueta MD   50 mg at 03/18/17 1815   • nicotine (NICODERM CQ) 14 MG/24HR patch 1 patch  1 patch Transdermal Q24H Portillo Argueta MD   1 patch at 03/18/17 2120   • nitroglycerin (NITROSTAT) SL tablet 0.4 mg  0.4 mg Sublingual PRN Portillo Argueta MD       • ondansetron (ZOFRAN) injection 4 mg  4 mg Intravenous Q6H PRN Portillo Argueta MD       • pantoprazole (PROTONIX) injection 40 mg  40 mg Intravenous Q AM Portillo Argueta MD   40 mg at 03/19/17 0607   • Pharmacy to dose vancomycin   Does not apply Continuous PRN Alban Vázquez MD       • potassium chloride (K-DUR) CR tablet 10 mEq  10 mEq Oral Daily Portillo Argueta MD   10 mEq at 03/18/17 0900   • predniSONE (DELTASONE) tablet 40 mg  40 mg Oral Q12H Alban Vázquez MD   40 mg at 03/18/17 2120   • promethazine (PHENERGAN) tablet 25 mg  25 mg Oral Q6H PRN Portillo Argueta MD       • sodium chloride 0.9 % flush 1-10 mL  1-10 mL Intravenous PRN Portillo Argueta MD   10 mL at 03/16/17 2216   • vancomycin (VANCOCIN) 2,000 mg in sodium chloride 0.9 % 500 mL IVPB  15 mg/kg Intravenous Q18H Alban Vázquez MD   2,000 mg at 03/19/17 0438       Objective     Physical Exam:     Vital Sign Min/Max for last 24 hours  Temp  Min: 96.3 °F (35.7 °C)  Max: 98.2 °F (36.8 °C)   BP  Min: 135/64  Max: 151/89   Pulse  Min: 62  Max: 93   Resp  Min: 18  Max: 21   SpO2  Min: 92 %  Max: 98 %   Flow (L/min)  Min: 3  Max: 3   No Data Recorded       Physical Exam:    Physical Exam   Constitutional: She is oriented to person, place, and time. She appears well-developed and  well-nourished. No distress.   HENT:   Head: Normocephalic.   Right Ear: External ear normal.   Left Ear: External ear normal.   Nose: Nose normal.   Mouth/Throat: Oropharynx is clear and moist. No oropharyngeal exudate.   Eyes: Conjunctivae and EOM are normal. Pupils are equal, round, and reactive to light. Right eye exhibits no discharge. Left eye exhibits no discharge.   Neck: Normal range of motion.   Cardiovascular: Normal rate, regular rhythm and normal heart sounds.    Pulmonary/Chest: Effort normal. No respiratory distress. She has decreased breath sounds. She has wheezes. She has rales.   Abdominal: Soft. Bowel sounds are normal. She exhibits no distension. There is no tenderness.   Musculoskeletal: Normal range of motion. She exhibits no edema, tenderness or deformity.   Neurological: She is alert and oriented to person, place, and time.   Skin: Skin is warm and dry. She is not diaphoretic.   Psychiatric: She has a normal mood and affect. Her behavior is normal.   Nursing note and vitals reviewed.           Results Review:     WBC WBC   Date Value Ref Range Status   03/19/2017 10.25 (H) 3.20 - 9.80 10*3/mm3 Final   03/18/2017 13.97 (H) 3.20 - 9.80 10*3/mm3 Final   03/17/2017 8.42 3.20 - 9.80 10*3/mm3 Final   03/16/2017 9.56 3.20 - 9.80 10*3/mm3 Final      HGB HEMOGLOBIN   Date Value Ref Range Status   03/19/2017 9.2 (L) 12.0 - 15.5 g/dL Final   03/18/2017 9.1 (L) 12.0 - 15.5 g/dL Final   03/17/2017 8.6 (L) 12.0 - 15.5 g/dL Final   03/16/2017 8.8 (L) 12.0 - 15.5 g/dL Final      HCT HEMATOCRIT   Date Value Ref Range Status   03/19/2017 29.9 (L) 35.0 - 45.0 % Final   03/18/2017 29.3 (L) 35.0 - 45.0 % Final   03/17/2017 27.9 (L) 35.0 - 45.0 % Final   03/16/2017 28.3 (L) 35.0 - 45.0 % Final      Platlets PLATELETS   Date Value Ref Range Status   03/19/2017 332 150 - 450 10*3/mm3 Final   03/18/2017 339 150 - 450 10*3/mm3 Final   03/17/2017 261 150 - 450 10*3/mm3 Final   03/16/2017 249 150 - 450 10*3/mm3 Final       MCV MCV   Date Value Ref Range Status   03/19/2017 76.1 (L) 80.0 - 98.0 fL Final   03/18/2017 74.7 (L) 80.0 - 98.0 fL Final   03/17/2017 75.0 (L) 80.0 - 98.0 fL Final   03/16/2017 75.7 (L) 80.0 - 98.0 fL Final            GLUCOSE   Date Value Ref Range Status   03/19/2017 117 (H) 60 - 100 mg/dL Final     SODIUM   Date Value Ref Range Status   03/19/2017 142 137 - 145 mmol/L Final     POTASSIUM   Date Value Ref Range Status   03/19/2017 4.5 3.5 - 5.1 mmol/L Final     CO2   Date Value Ref Range Status   03/19/2017 27.0 22.0 - 31.0 mmol/L Final     CHLORIDE   Date Value Ref Range Status   03/19/2017 103 95 - 110 mmol/L Final     ANION GAP   Date Value Ref Range Status   03/19/2017 12.0 5.0 - 15.0 mmol/L Final     CREATININE   Date Value Ref Range Status   03/19/2017 0.81 0.50 - 1.00 mg/dL Final     BUN   Date Value Ref Range Status   03/19/2017 28 (H) 7 - 21 mg/dL Final     BUN/CREATININE RATIO   Date Value Ref Range Status   03/19/2017 34.6 (H) 7.0 - 25.0 Final     CALCIUM   Date Value Ref Range Status   03/19/2017 8.2 (L) 8.4 - 10.2 mg/dL Final     EGFR NON  AMER   Date Value Ref Range Status   03/19/2017 73 >60 mL/min/1.73 Final     ALKALINE PHOSPHATASE   Date Value Ref Range Status   03/19/2017 135 (H) 38 - 126 U/L Final     TOTAL PROTEIN   Date Value Ref Range Status   03/19/2017 6.9 6.3 - 8.6 g/dL Final     ALT (SGPT)   Date Value Ref Range Status   03/19/2017 32 9 - 52 U/L Final     AST (SGOT)   Date Value Ref Range Status   03/19/2017 54 (H) 14 - 36 U/L Final     TOTAL BILIRUBIN   Date Value Ref Range Status   03/19/2017 0.3 0.2 - 1.3 mg/dL Final     ALBUMIN   Date Value Ref Range Status   03/19/2017 3.30 (L) 3.40 - 4.80 g/dL Final     GLOBULIN   Date Value Ref Range Status   03/19/2017 3.6 (H) 2.3 - 3.5 gm/dL Final     A/G RATIO   Date Value Ref Range Status   03/19/2017 0.9 (L) 1.1 - 1.8 g/dL Final       BLOOD CULTURE   Date Value Ref Range Status   03/16/2017 No growth at 2 days  Preliminary    03/16/2017 No growth at 2 days  Preliminary     RESPIRATORY CULTURE   Date Value Ref Range Status   03/17/2017 Moderate growth (3+) Staphylococcus aureus, MRSA (C)  Preliminary     Comment:     droplet precautions requested  contact precautions requested  Methicillin resistant Staphylococcus aureus, Patient may be an isolation risk.     URINE CULTURE   Date Value Ref Range Status   03/16/2017 No growth at 24 hours  Final       Imaging Results (last 24 hours)     ** No results found for the last 24 hours. **           I reviewed the patient's new clinical results.  I reviewed the patient's new imaging results and agree with the interpretation.     ASSESSMENT/PLAN:   Assessment/Plan   Active Hospital Problems (** Indicates Principal Problem)    Diagnosis Date Noted   • **Pneumonia of both lungs due to methicillin resistant Staphylococcus aureus (MRSA) [J15.212] 03/16/2017     -Stop azithromycin since atypical are negative Ceftriaxone 1 g IV daily today is day 4. Will add vancomycin day 2 for MRSA coverage, DuoNebs  - Sputum culture -show MRSA, mycoplasma PCR -pending; Legionella ag - negative, pneumococcal ag - negative.  - Lactic acid 3.1 on admission, repeat 2.0; now 1.2  - CRP 28.30, trending down today is 12.   - Incentive spirometry, supplemental oxygen PRN     • Sepsis due to pneumonia [J18.9, A41.9] 03/17/2017     -Ceftriaxone 1 g IV daily, plus vancomycin DuoNebs  - Sputum culture - positive for MARSA. mycoplasma PCR -pending;   - Legionella ag - negative, pneumococcal ag - negative.  - Lactic acid 3.1 on admission, repeat 2.0; now 1.2  - CRP 28.30, trend CRP  - Incentive spirometry, supplemental oxygen PRN     • Laryngitis, acute [J04.0] 03/17/2017   • Acute respiratory failure with hypoxia [J96.01] 03/16/2017     Oxygen supplementation, ABG, duo-nebs  Taper steroid to prednisone 40mg BID.      • Prophylactic hospital isolation [Z41.8] 03/16/2017     History of CRE infection continue contact precautions      • Diabetes mellitus type 2 in obese [E11.9, E66.9] 08/24/2016     - Increase night time Levemir to 95u qhs;  Levemir 90 units qAM, novolog SSI  -will add meal time insulin which she takes at home.   -blood sugar is better control today.          • Chronic obstructive pulmonary disease [J44.9] 08/24/2016     Duo Nebs, Symbicort, oxygen supplementation     • Tobacco dependence syndrome [F17.200]      1/2ppd  - Nicotine patch ordered     • Sleep apnea [G47.30]      CPAP ordered     • Dyslipidemia [E78.5]      Home atorvastatin     • Morbid obesity due to excess calories [E66.01]      BMI 44.5, dietary consult ordered     • Essential hypertension [I10]      Home lisinopril and metoprolol, will administer IV lasix 80 mg today, 40 mg BID thereafter     • Asthma [J45.909]    • GERD (gastroesophageal reflux disease) [K21.9]      Protonix     • Peripheral vascular disease [I73.9]    • Coronary arteriosclerosis [I25.10]      S/p CABG X3 in 2013, continue home Plavix, Metoprolol, and Atorvastatin        Resolved Hospital Problems    Diagnosis Date Noted Date Resolved   No resolved problems to display.   PT/OT ordered.     DVT ppx: lovenox     I discussed the patients findings and my recommendations with patient.        Time: 28 mins           This document has been electronically signed by Alban Vázquez MD on March 19, 2017 9:05 AM

## 2017-03-19 NOTE — SIGNIFICANT NOTE
03/19/17 1519   Rehab Treatment   Discipline occupational therapist   Rehab Evaluation   Evaluation Not Performed patient/family declined evaluation  (New order received & eval attempted. Pt again reports independence with ADLs & functional mobility. She does not feel she needs OT services. OTR will sign off.)

## 2017-03-20 PROBLEM — J18.9 SEPSIS DUE TO PNEUMONIA: Status: RESOLVED | Noted: 2017-03-17 | Resolved: 2017-03-20

## 2017-03-20 PROBLEM — A41.9 SEPSIS DUE TO PNEUMONIA: Status: RESOLVED | Noted: 2017-03-17 | Resolved: 2017-03-20

## 2017-03-20 LAB
ALBUMIN SERPL-MCNC: 3.4 G/DL (ref 3.4–4.8)
ALBUMIN/GLOB SERPL: 1 G/DL (ref 1.1–1.8)
ALP SERPL-CCNC: 125 U/L (ref 38–126)
ALT SERPL W P-5'-P-CCNC: 28 U/L (ref 9–52)
ANION GAP SERPL CALCULATED.3IONS-SCNC: 12 MMOL/L (ref 5–15)
AST SERPL-CCNC: 46 U/L (ref 14–36)
BASOPHILS # BLD AUTO: 0.02 10*3/MM3 (ref 0–0.2)
BASOPHILS NFR BLD AUTO: 0.2 % (ref 0–2)
BILIRUB SERPL-MCNC: 0.2 MG/DL (ref 0.2–1.3)
BUN BLD-MCNC: 24 MG/DL (ref 7–21)
BUN/CREAT SERPL: 32 (ref 7–25)
CALCIUM SPEC-SCNC: 8.8 MG/DL (ref 8.4–10.2)
CHLORIDE SERPL-SCNC: 102 MMOL/L (ref 95–110)
CO2 SERPL-SCNC: 25 MMOL/L (ref 22–31)
CREAT BLD-MCNC: 0.75 MG/DL (ref 0.5–1)
CRP SERPL-MCNC: 1.5 MG/DL (ref 0–1)
DEPRECATED RDW RBC AUTO: 49.8 FL (ref 36.4–46.3)
EOSINOPHIL # BLD AUTO: 0 10*3/MM3 (ref 0–0.7)
EOSINOPHIL NFR BLD AUTO: 0 % (ref 0–7)
ERYTHROCYTE [DISTWIDTH] IN BLOOD BY AUTOMATED COUNT: 18 % (ref 11.5–14.5)
GFR SERPL CREATININE-BSD FRML MDRD: 79 ML/MIN/1.73 (ref 51–120)
GLOBULIN UR ELPH-MCNC: 3.5 GM/DL (ref 2.3–3.5)
GLUCOSE BLD-MCNC: 253 MG/DL (ref 60–100)
GLUCOSE BLDC GLUCOMTR-MCNC: 193 MG/DL (ref 70–130)
GLUCOSE BLDC GLUCOMTR-MCNC: 242 MG/DL (ref 70–130)
GLUCOSE BLDC GLUCOMTR-MCNC: 260 MG/DL (ref 70–130)
GLUCOSE BLDC GLUCOMTR-MCNC: 299 MG/DL (ref 70–130)
GLUCOSE BLDC GLUCOMTR-MCNC: 301 MG/DL (ref 70–130)
GLUCOSE BLDC GLUCOMTR-MCNC: 343 MG/DL (ref 70–130)
HCT VFR BLD AUTO: 30.4 % (ref 35–45)
HGB BLD-MCNC: 9.3 G/DL (ref 12–15.5)
IMM GRANULOCYTES # BLD: 0.44 10*3/MM3 (ref 0–0.02)
IMM GRANULOCYTES NFR BLD: 4.3 % (ref 0–0.5)
LYMPHOCYTES # BLD AUTO: 1.8 10*3/MM3 (ref 0.6–4.2)
LYMPHOCYTES NFR BLD AUTO: 17.4 % (ref 10–50)
MCH RBC QN AUTO: 23.3 PG (ref 26.5–34)
MCHC RBC AUTO-ENTMCNC: 30.6 G/DL (ref 31.4–36)
MCV RBC AUTO: 76.2 FL (ref 80–98)
MONOCYTES # BLD AUTO: 0.63 10*3/MM3 (ref 0–0.9)
MONOCYTES NFR BLD AUTO: 6.1 % (ref 0–12)
NEUTROPHILS # BLD AUTO: 7.44 10*3/MM3 (ref 2–8.6)
NEUTROPHILS NFR BLD AUTO: 72 % (ref 37–80)
PLATELET # BLD AUTO: 328 10*3/MM3 (ref 150–450)
PMV BLD AUTO: 8.3 FL (ref 8–12)
POTASSIUM BLD-SCNC: 4.5 MMOL/L (ref 3.5–5.1)
PROT SERPL-MCNC: 6.9 G/DL (ref 6.3–8.6)
RBC # BLD AUTO: 3.99 10*6/MM3 (ref 3.77–5.16)
SODIUM BLD-SCNC: 139 MMOL/L (ref 137–145)
VANCOMYCIN TROUGH SERPL-MCNC: 13.25 MCG/ML (ref 10–15)
WBC NRBC COR # BLD: 10.33 10*3/MM3 (ref 3.2–9.8)

## 2017-03-20 PROCEDURE — 97162 PT EVAL MOD COMPLEX 30 MIN: CPT

## 2017-03-20 PROCEDURE — 25010000002 CEFTRIAXONE: Performed by: FAMILY MEDICINE

## 2017-03-20 PROCEDURE — 63710000001 INSULIN ASPART PER 5 UNITS: Performed by: FAMILY MEDICINE

## 2017-03-20 PROCEDURE — G8978 MOBILITY CURRENT STATUS: HCPCS

## 2017-03-20 PROCEDURE — 99232 SBSQ HOSP IP/OBS MODERATE 35: CPT | Performed by: FAMILY MEDICINE

## 2017-03-20 PROCEDURE — 94799 UNLISTED PULMONARY SVC/PX: CPT

## 2017-03-20 PROCEDURE — 94762 N-INVAS EAR/PLS OXIMTRY CONT: CPT

## 2017-03-20 PROCEDURE — 25010000002 ENOXAPARIN PER 10 MG: Performed by: FAMILY MEDICINE

## 2017-03-20 PROCEDURE — 25010000002 VANCOMYCIN PER 500 MG: Performed by: FAMILY MEDICINE

## 2017-03-20 PROCEDURE — 82962 GLUCOSE BLOOD TEST: CPT

## 2017-03-20 PROCEDURE — 97116 GAIT TRAINING THERAPY: CPT

## 2017-03-20 PROCEDURE — 85025 COMPLETE CBC W/AUTO DIFF WBC: CPT | Performed by: FAMILY MEDICINE

## 2017-03-20 PROCEDURE — 94660 CPAP INITIATION&MGMT: CPT

## 2017-03-20 PROCEDURE — 63710000001 PREDNISONE PER 5 MG: Performed by: FAMILY MEDICINE

## 2017-03-20 PROCEDURE — 86140 C-REACTIVE PROTEIN: CPT | Performed by: FAMILY MEDICINE

## 2017-03-20 PROCEDURE — 63710000001 INSULIN DETEMIR PER 5 UNITS: Performed by: FAMILY MEDICINE

## 2017-03-20 PROCEDURE — 80202 ASSAY OF VANCOMYCIN: CPT | Performed by: FAMILY MEDICINE

## 2017-03-20 PROCEDURE — 80053 COMPREHEN METABOLIC PANEL: CPT | Performed by: FAMILY MEDICINE

## 2017-03-20 PROCEDURE — 97530 THERAPEUTIC ACTIVITIES: CPT

## 2017-03-20 PROCEDURE — G8979 MOBILITY GOAL STATUS: HCPCS

## 2017-03-20 PROCEDURE — 94760 N-INVAS EAR/PLS OXIMETRY 1: CPT

## 2017-03-20 RX ADMIN — CYCLOBENZAPRINE HYDROCHLORIDE 10 MG: 10 TABLET, FILM COATED ORAL at 17:35

## 2017-03-20 RX ADMIN — CLOPIDOGREL BISULFATE 75 MG: 75 TABLET ORAL at 09:21

## 2017-03-20 RX ADMIN — IPRATROPIUM BROMIDE AND ALBUTEROL SULFATE 3 ML: 2.5; .5 SOLUTION RESPIRATORY (INHALATION) at 13:17

## 2017-03-20 RX ADMIN — VANCOMYCIN HYDROCHLORIDE 2000 MG: 1 INJECTION, POWDER, LYOPHILIZED, FOR SOLUTION INTRAVENOUS at 17:36

## 2017-03-20 RX ADMIN — CYCLOBENZAPRINE HYDROCHLORIDE 10 MG: 10 TABLET, FILM COATED ORAL at 09:21

## 2017-03-20 RX ADMIN — ENOXAPARIN SODIUM 40 MG: 40 INJECTION SUBCUTANEOUS at 09:21

## 2017-03-20 RX ADMIN — GABAPENTIN 800 MG: 400 CAPSULE ORAL at 05:36

## 2017-03-20 RX ADMIN — FUROSEMIDE 40 MG: 40 TABLET ORAL at 09:22

## 2017-03-20 RX ADMIN — NICOTINE 1 PATCH: 14 PATCH, EXTENDED RELEASE TRANSDERMAL at 21:55

## 2017-03-20 RX ADMIN — PREDNISONE 40 MG: 20 TABLET ORAL at 21:55

## 2017-03-20 RX ADMIN — METOPROLOL TARTRATE 50 MG: 50 TABLET ORAL at 09:22

## 2017-03-20 RX ADMIN — IPRATROPIUM BROMIDE AND ALBUTEROL SULFATE 3 ML: 2.5; .5 SOLUTION RESPIRATORY (INHALATION) at 06:52

## 2017-03-20 RX ADMIN — LISINOPRIL 10 MG: 10 TABLET ORAL at 09:22

## 2017-03-20 RX ADMIN — INSULIN ASPART 16 UNITS: 100 INJECTION, SOLUTION INTRAVENOUS; SUBCUTANEOUS at 12:08

## 2017-03-20 RX ADMIN — ATORVASTATIN CALCIUM 20 MG: 40 TABLET, FILM COATED ORAL at 09:22

## 2017-03-20 RX ADMIN — INSULIN DETEMIR 95 UNITS: 100 INJECTION, SOLUTION SUBCUTANEOUS at 21:55

## 2017-03-20 RX ADMIN — GABAPENTIN 800 MG: 400 CAPSULE ORAL at 15:04

## 2017-03-20 RX ADMIN — GABAPENTIN 800 MG: 400 CAPSULE ORAL at 21:55

## 2017-03-20 RX ADMIN — BUDESONIDE AND FORMOTEROL FUMARATE DIHYDRATE 2 PUFF: 160; 4.5 AEROSOL RESPIRATORY (INHALATION) at 06:53

## 2017-03-20 RX ADMIN — INSULIN ASPART 4 UNITS: 100 INJECTION, SOLUTION INTRAVENOUS; SUBCUTANEOUS at 17:53

## 2017-03-20 RX ADMIN — IPRATROPIUM BROMIDE AND ALBUTEROL SULFATE 3 ML: 2.5; .5 SOLUTION RESPIRATORY (INHALATION) at 19:02

## 2017-03-20 RX ADMIN — INSULIN ASPART 12 UNITS: 100 INJECTION, SOLUTION INTRAVENOUS; SUBCUTANEOUS at 07:13

## 2017-03-20 RX ADMIN — INSULIN ASPART 30 UNITS: 100 INJECTION, SOLUTION INTRAVENOUS; SUBCUTANEOUS at 09:22

## 2017-03-20 RX ADMIN — INSULIN DETEMIR 95 UNITS: 100 INJECTION, SOLUTION SUBCUTANEOUS at 07:14

## 2017-03-20 RX ADMIN — PREDNISONE 40 MG: 20 TABLET ORAL at 09:22

## 2017-03-20 RX ADMIN — INSULIN ASPART 30 UNITS: 100 INJECTION, SOLUTION INTRAVENOUS; SUBCUTANEOUS at 12:09

## 2017-03-20 RX ADMIN — CEFTRIAXONE 1 G: 1 INJECTION, POWDER, FOR SOLUTION INTRAMUSCULAR; INTRAVENOUS at 09:23

## 2017-03-20 RX ADMIN — METOPROLOL TARTRATE 50 MG: 50 TABLET ORAL at 17:35

## 2017-03-20 RX ADMIN — POTASSIUM CHLORIDE 10 MEQ: 750 TABLET, FILM COATED, EXTENDED RELEASE ORAL at 09:25

## 2017-03-20 RX ADMIN — BUDESONIDE AND FORMOTEROL FUMARATE DIHYDRATE 2 PUFF: 160; 4.5 AEROSOL RESPIRATORY (INHALATION) at 19:09

## 2017-03-20 RX ADMIN — PANTOPRAZOLE SODIUM 40 MG: 40 INJECTION, POWDER, FOR SOLUTION INTRAVENOUS at 05:35

## 2017-03-20 RX ADMIN — CETIRIZINE HYDROCHLORIDE 10 MG: 10 TABLET, FILM COATED ORAL at 09:22

## 2017-03-20 RX ADMIN — INSULIN ASPART 30 UNITS: 100 INJECTION, SOLUTION INTRAVENOUS; SUBCUTANEOUS at 17:53

## 2017-03-20 NOTE — PROGRESS NOTES
Acute Care - Physical Therapy Initial Evaluation  [facility]     Patient Name: Yamileth Slater  : 1960  MRN: [id number]  Today's Date: 3/20/2017   Onset of Illness/Injury or Date of Surgery Date: 17  Date of Referral to PT: 17  Referring Physician: Dr. Vázquez      Admit Date: 3/16/2017     Visit Dx:    ICD-10-CM ICD-9-CM   1. Pneumonia of both lungs due to infectious organism, unspecified part of lung J18.9 483.8   2. Acute respiratory failure with hypoxia J96.01 518.81   3. Impaired physical mobility Z74.09 781.99     Patient Active Problem List   Diagnosis   • Diabetes mellitus type 2 in obese   • Chronic obstructive pulmonary disease   • Low back pain   • Vitamin D deficiency   • Type 2 diabetes mellitus   • Viral wart   • Tobacco dependence syndrome   • Surgical follow-up care   • Sleep apnea   • Shoulder joint pain   • Rash   • Peripheral vascular disease   • Pain   • Non-healing surgical wound   • Neurologic disorder associated with diabetes mellitus   • Need for vaccination   • Morbid obesity due to excess calories   • Mixed hyperlipidemia   • Lower limb anomaly   • Leukocytosis   • Kidney stone   • Infestation by Sarcoptes scabiei deepak hominis   • Infection of skin and subcutaneous tissue   • Hypertensive disorder   • History of colon polyps   • GERD (gastroesophageal reflux disease)   • Furuncle of neck   • Excoriated eczema   • Essential hypertension   • Encounter for general adult medical examination with abnormal findings   • Dysphagia   • Dyslipidemia   • Diabetes mellitus   • Coronary arteriosclerosis   • Chronic obstructive lung disease   • Chronic folliculitis   • Chest pain   • Carotid artery stenosis   • Candidiasis of vulva and vagina   • Candidiasis of mouth   • Asthma   • Anxiety states   • 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 LICA stent 5/2014.   • Chest pain    • Chronic folliculitis    • Chronic obstructive lung disease    • Coronary arteriosclerosis    • Diabetes mellitus      no retinopathy; A1C 9.1      • Dyslipidemia    • Dysphagia    • Encounter for general adult medical examination with abnormal findings    • Essential hypertension    • Excoriated eczema      asteosis/keratosis   • Furuncle of neck    • GERD (gastroesophageal reflux disease)    • History of colon polyps    • Hypertensive disorder    • Infection of skin and subcutaneous tissue      rt perineal abscess   • Infestation by Sarcoptes scabiei deepak hominis    • Kidney stone    • Leukocytosis    • Lower limb anomaly      Abscess of lower limb - ANTX causing n/v      • Mixed hyperlipidemia    • Morbid obesity due to excess calories      BMI 44.5   • Need for vaccination    • Neurologic disorder associated with diabetes mellitus    • Non-healing surgical wound      LLE EVHa   • Pain      LLE   • Peripheral vascular disease    • Rash    • Shoulder joint pain    • Sleep apnea    • Surgical follow-up care      5/27/14 L carotid stent   • Tobacco dependence syndrome      1/2ppd      • Type 2 diabetes mellitus    • Viral wart      RIGHT middle phalanx   • Vitamin D deficiency      Past Surgical History   Procedure Laterality Date   • Coronary artery bypass graft  11/15/2013     CABG x 3 with LIMA to LAD and coronary endarterectomy to LAD, SVG to Ramus intermedius branch and SVG to PDA.    • Cardiac catheterization  08/09/2013     Severe  multivessel CAD with critical lesions noted in the RCA, obtuse marginal two, ramus intermemdious, and LAD as described above. Normal LV systolic function with no wall motion abnormality.    • Cardiac catheterization  05/27/2014      LEFT Carotid Stent    • Colonoscopy  05/02/2016     Normal colon.Diverticulosis in the sigmoid colon.No specimens collected.    • Other surgical history  01/25/2016     DESTRUCTION OF BENIGN LESION      • Endoscopy  09/23/2015      Esophageal stricture present.Normal stomach.Normal duodenum.    • Endoscopy  11/16/2015     w/ dilatation - Esophageal stricture present,Dilatation performed.Normal stomach and duodenum.    • Other surgical history  04/18/2014     ear/neck - L attempted CEA, Neck Dissection    • Incision and drainage abscess  01/02/2016     Incision and drainage of right perineal abscess.    • Incision and drainage abscess  02/18/2014     Incision and Drainage of abscess of left lower leg           PT ASSESSMENT (last 72 hours)      PT Evaluation       03/20/17 1005 03/19/17 4619    Rehab Evaluation    Document Type evaluation  -CZ     Subjective Information agree to therapy;complains of;dyspnea  -CZ     Evaluation Not Performed  patient/family declined evaluation   New order received & eval attempted. Pt again reports independence with ADLs & functional mobility. She does not feel she needs OT services. OTR will sign off.  -RW    Patient Effort, Rehab Treatment good  -CZ     Symptoms Noted During/After Treatment none  -CZ     General Information    Patient Profile Review yes  -CZ     Onset of Illness/Injury or Date of Surgery Date 03/16/17  -CZ     Referring Physician Dr. Vázquez  -CZ     General Observations Patient supine in bed, on 2 LPM supplemental O2, no apparent distress, agreeable to participate.   -CZ     Pertinent History Of Current Problem Patient admitted with SOB, Fever of 103, fatigue, cough and sore throat. Diagnosed with Acute Respiratory Failure with Hypoxia  and Pneumonia (with MRSA).  -CZ     Precautions/Limitations --   Droplet/contact precautions.  -CZ     Prior Level of Function independent:;community mobility;gait;transfer;bed mobility  -CZ     Equipment Currently Used at Home none  -CZ     Plans/Goals Discussed With patient  -CZ     Benefits Reviewed patient:;increase independence;increase strength;increase balance  -CZ     Living Environment    Lives With spouse  -CZ     Living Arrangements apartment   senior apartment  -CZ     Home Accessibility --   senior living apartment  -CZ     Clinical Impression    Date of Referral to PT 03/19/17  -CZ     PT Diagnosis Impaired physical mobility  -CZ     Prognosis Good.  -CZ     Functional Level At Time Of Evaluation Independent with bed mobility, transfers and gait without an AD.  -CZ     Patient/Family Goals Statement Patient wishes to return home.   -CZ     Criteria for Skilled Therapeutic Interventions Met treatment indicated  -CZ     Impairments Found (describe specific impairments) aerobic capacity/endurance;gait, locomotion, and balance  -CZ     Rehab Potential good, to achieve stated therapy goals  -CZ     Predicted Duration of Therapy Intervention (days/wks) 3-5 days  -CZ     Vital Signs    Pre Systolic BP Rehab 135  -CZ     Pre Treatment Diastolic BP 61  -CZ     Post Systolic BP Rehab 151  -CZ     Post Treatment Diastolic BP 68  -CZ     Pretreatment Heart Rate (beats/min) 85  -CZ     Intratreatment Heart Rate (beats/min) 82  -CZ     Posttreatment Heart Rate (beats/min) 94  -CZ     Pre SpO2 (%) 97  -CZ     O2 Delivery Pre Treatment supplemental O2  -CZ     Intra SpO2 (%) 91  -CZ     O2 Delivery Intra Treatment room air  -CZ     Post SpO2 (%) 96  -CZ     O2 Delivery Post Treatment room air  -CZ     Pain Assessment    Pain Assessment No/denies pain  -CZ     Vision Assessment/Intervention    Visual Impairment WFL with corrective lenses   Glasses for reading only.  -CZ     Cognitive Assessment/Intervention    Current  Cognitive/Communication Assessment functional  -CZ     Orientation Status oriented x 4  -CZ     Follows Commands/Answers Questions 100% of the time  -CZ     Personal Safety WNL/WFL  -CZ     Personal Safety Interventions nonskid shoes/slippers when out of bed  -CZ     ROM (Range of Motion)    General ROM no range of motion deficits identified  -CZ     General ROM Detail WFL BLEs.  -CZ     MMT (Manual Muscle Testing)    General MMT Assessment no strength deficits identified  -CZ     General MMT Assessment Detail 4+/5 BLEs grossly  -CZ     Bed Mobility, Assessment/Treatment    Bed Mobility, Roll Left, [place] independent  -CZ     Bed Mobility, Roll Right, [place] independent  -CZ     Bed Mobility, Scoot/Bridge, [place] independent  -CZ     Bed Mob, Supine to Sit, [place] independent  -CZ     Bed Mob, Sit to Supine, [place] independent  -CZ     Transfer Assessment/Treatment    Transfers, Sit-Stand [place] independent  -CZ     Transfers, Stand-Sit [place] independent  -CZ     Gait Assessment/Treatment    Gait, [place] Level independent  -CZ     Gait, Distance (Feet) 40  -CZ     Gait, Gait Pattern Analysis swing-through gait  -CZ     Gait, Gait Deviations --   Decreased foot clearance, poor footwear.  -CZ     Gait, Comment Discussed with patient her poor footwear (house slippers).  -CZ     Therapy Exercises    Bilateral Lower Extremities AROM:;20 reps;sitting;ankle pumps/circles;LAQ;hip abduction/adduction;hip flexion   O2 sats 91-96% on RA, no c/o SOB.   -CZ     Positioning and Restraints    Pre-Treatment Position in bed  -CZ     Post Treatment Position bed  -CZ     In Bed supine;call light within reach  -CZ       03/18/17 1454 03/18/17 1432    Rehab Evaluation    Evaluation Not Performed patient/family declined evaluation;other (see comments)   OT orders recieved and per discussion with the pt and her RN the pt is I and does not need skilled OT tx. The pt reports taking a shower  I'ly and that she feels comfortable going home. OT will defer eval and sign off at this time.   -LW other (see comments);patient/family declined evaluation   Pt defers evaluation 2* independent, she reports just finishing a shower independently and was found walking around room without AD. She reports no need for PT at this time and no concerns upon d/c home. Pt aware if changes PT can return  -MN      03/18/17 0359       General Information    Equipment Currently Used at Home --   cpap  -BD       User Key  (r) = Recorded By, (t) = Taken By, (c) = Cosigned By    Initials Name Provider Type    RW Erica Lacy, OTR/L Occupational Therapist    MN Jessica Lindsay, PT Physical Therapist    ROSALINDA Rasheed, RN Registered Nurse    LW Sigrid Ruth, OT Occupational Therapist    CZ Christopher Arnett, PT Physical Therapist          Physical Therapy Education     Title: PT OT SLP Therapies (Active)     Topic: Physical Therapy (Active)     Point: Mobility training (Active)    Learning Progress Summary    Learner Readiness Method Response Comment Documented by Status   Patient Acceptance E NR Patient educated on the importance of increasing her activity tolerance and maintaining gains once she is discharged home.  03/20/17 1313 Active                      User Key     Initials Effective Dates Name Provider Type Discipline     02/17/17 -  Christopher Arnett, PT Physical Therapist PT                PT Recommendation and Plan  Anticipated Discharge Disposition: home  Planned Therapy Interventions: gait training, stair training, strengthening  PT Frequency: other (see comments) (5-14x/week)  Plan of Care Review  Plan Of Care Reviewed With: patient  Outcome Summary/Follow up Plan: Initial PT evaluation complete.  Patient demonstrates independence with transfers, bed mobility and gait (without an AD, for short distances).  Patient  maintains O2 saturation of 91-96% on RA with gait  and with seated ther ex.  Nurse notified.  "Patient would benefit from continued skilled PT to increase activity tolerance and lower fall risk.           IP PT Goals       03/20/17 1005          Gait Training PT STG    Gait Training Goal PT STG, Date Established 03/20/17  -CZ      Gait Training Goal PT STG, Time to Achieve 5 - 7 days  -CZ      Gait Training Goal PT STG, [place] Level independent  -CZ      Gait Training Goal PT STG, Distance to Achieve 100  -CZ      Gait Training Goal PT STG, Additional Goal Ambulating without an AD.  -CZ      Stair Training PT STG    Stair Training Goal PT STG, Date Established 03/20/17  -CZ      Stair Training Goal PT STG, Time to Achieve 5 - 7 days  -CZ      Stair Training Goal PT STG, Number of Steps 4   Patient has stairs at her daughter's house.  -CZ      Stair Training Goal PT STG, [place] Level independent  -CZ      Stair Training Goal PT STG, Assist Device 1 handrail  -CZ      Physical Therapy PT LTG    Physical Therapy PT LTG, Date Established 03/20/17  -CZ      Physical Therapy PT LTG, Activity Type Tinetti fall risk assessment.   -CZ      Physical Therapy PT LTG, Addisional Goal Score 28/28 or low fall risk.  -CZ        User Key  (r) = Recorded By, (t) = Taken By, (c) = Cosigned By    Initials Name Provider Type    ROSANNA Arnett, PT Physical Therapist                Outcome Measures       03/20/17 1005          Tinetti Assessment    Tinetti Assessment yes  -CZ      Sitting Balance 1  -CZ      Arises 2  -CZ      Attempts to Rise 2  -CZ      Immediate Standing Balance (first 5 sec) 2  -CZ      Standing Balance 2  -CZ      Sternal Nudge (feet close together) 2  -CZ      Eyes Closed (feet close together) 1  -CZ      Turning 360 Degrees- Steps 1  -CZ      Turning 360 Degrees- Steadiness 1  -CZ      Sitting Down 2  -CZ      Tinetti Balance Score 16  -CZ      Gait Initiation (immediate after told \"go\") 1  -CZ      Step Length- Right Swing 1  -CZ      Step Length- Left Swing 1  -CZ      Foot Clearance- Right " Foot 0  -CZ      Foot Clearance- Left Foot 0  -CZ      Step Symmetry 1  -CZ      Step Continuity 1  -CZ      Path (excursion) 2  -CZ      Trunk 2  -CZ      Base of Support 1  -CZ      Gait Score 10  -CZ      Tinetti Total Score 26  -CZ      Functional Assessment    Outcome Measure Options Tinetti  -CZ        User Key  (r) = Recorded By, (t) = Taken By, (c) = Cosigned By    Initials Name Provider Type    CZ Christopher Arnett, PT Physical Therapist           Time Calculation:         PT Charges       03/20/17 1321          Time Calculation    Start Time 1005  -CZ      Stop Time 1059  -CZ      Time Calculation (min) 54 min  -CZ      PT Received On 03/20/17  -CZ      PT Goal Re-Cert Due Date 04/02/17  -CZ      Time Calculation- PT    Total Timed Code Minutes- PT 39 minute(s)  -CZ        User Key  (r) = Recorded By, (t) = Taken By, (c) = Cosigned By    Initials Name Provider Type    CZ Christopher Arnett, PT Physical Therapist          Therapy Charges for Today     Code Description Service Date Service Provider Modifiers Qty    10098561288 HC PT MOBILITY CURRENT 3/20/2017 Christopher Arnett, PT GP, CI 1    41318413254 HC PT MOBILITY PROJECTED 3/20/2017 Christopher Arnett, PT GP, CH 1    32030267465 HC PT EVAL MOD COMPLEXITY 1 3/20/2017 Christopher Arnett, PT GP 1    76326046367 HC GAIT TRAINING EA 15 MIN 3/20/2017 Christopher Arnett, PT GP 1    18036657815 HC PT THERAPEUTIC ACT EA 15 MIN 3/20/2017 Christopher Arnett, PT GP 2          PT G-Codes  PT Professional Judgement Used?: Yes  Outcome Measure Options: Tinetti  Score: 26  Functional Limitation: Mobility: Walking and moving around  Mobility: Walking and Moving Around Current Status (): At least 1 percent but less than 20 percent impaired, limited or restricted  Mobility: Walking and Moving Around Goal Status (): 0 percent impaired, limited or restricted      Christopher Arnett, PT  3/20/2017

## 2017-03-20 NOTE — PROGRESS NOTES
FAMILY MEDICINE PROGRESS NOTE  NAME: Yamileth Sltaer  : 1960  MRN: [id number]     LOS: 3 days   Full Code  PROVIDER OF SERVICE:     Chief Complaint:  Pneumonia of both lungs due to methicillin resistant Staphylococcus aureus (MRSA)    Subjective     Interval History:  History taken from: patient  Subjective: Patient is a 56 yo  female who was admitted for sepsis secondary to pneumonia.  Patient also has laryngitis.  She is feeling little better today.    Review of Systems:   Review of Systems   Constitutional: Negative for activity change, appetite change, chills and fever.   HENT: Positive for sore throat and voice change. Negative for congestion and trouble swallowing.    Eyes: Negative for photophobia and visual disturbance.   Respiratory: Positive for cough, shortness of breath (much improved since admission) and wheezing.    Cardiovascular: Positive for leg swelling. Negative for chest pain and palpitations.   Gastrointestinal: Negative for abdominal pain, constipation, diarrhea, nausea and vomiting.   Endocrine: Negative for polydipsia and polyphagia.   Genitourinary: Negative for difficulty urinating, dysuria and frequency.   Musculoskeletal: Negative for arthralgias and gait problem.   Skin: Positive for wound (complains of abscess in groin). Negative for color change, pallor and rash.   Neurological: Negative for weakness, light-headedness and headaches.   Psychiatric/Behavioral: Negative for sleep disturbance. The patient is not nervous/anxious.        Objective     Vital Signs  Temp:  [96.4 °F (35.8 °C)-98.2 °F (36.8 °C)] 96.4 °F (35.8 °C)  Heart Rate:  [62-90] 81  Resp:  [18-21] 18  BP: (134-148)/(61-79) 135/61    Physical Exam  Physical Exam   Constitutional: She is oriented to person, place, and time. She appears well-developed and well-nourished. No distress.   HENT:   Voice hoarse   Cardiovascular: Normal rate, regular rhythm and intact distal pulses.  Exam reveals no gallop and  no friction rub.    Murmur heard.  Pulmonary/Chest: Effort normal. No respiratory distress. She has wheezes. She has no rales.   Diminished breath sounds in the bases   Abdominal: Soft. Bowel sounds are normal. She exhibits no distension. There is no tenderness. There is no rebound and no guarding.   Musculoskeletal: She exhibits edema.   Neurological: She is alert and oriented to person, place, and time.   Skin: She is not diaphoretic.   Psychiatric: She has a normal mood and affect. Her behavior is normal. Judgment and thought content normal.   Nursing note and vitals reviewed.      Medication Review    Current Facility-Administered Medications:   •  acetaminophen (TYLENOL) tablet 650 mg, 650 mg, Oral, Q4H PRN, Portillo Argueta MD  •  atorvastatin (LIPITOR) tablet 20 mg, 20 mg, Oral, Daily, Portillo Argueta MD, 20 mg at 03/19/17 0906  •  bisacodyl (DULCOLAX) EC tablet 5 mg, 5 mg, Oral, Daily PRN, Portillo Argueta MD  •  bisacodyl (DULCOLAX) suppository 10 mg, 10 mg, Rectal, Daily PRN, Portillo Argueta MD  •  budesonide-formoterol (SYMBICORT) 160-4.5 MCG/ACT inhaler 2 puff, 2 puff, Inhalation, BID - RT, Portillo Argueta MD, 2 puff at 03/19/17 1829  •  cefTRIAXone (ROCEPHIN) 1 g/100 mL 0.9% NS (MBP), 1 g, Intravenous, Q24H, Portillo Argueta MD, 1 g at 03/19/17 0906  •  cetirizine (zyrTEC) tablet 10 mg, 10 mg, Oral, Daily, Portillo Argueta MD, 10 mg at 03/19/17 0907  •  clopidogrel (PLAVIX) tablet 75 mg, 75 mg, Oral, Daily, Portillo Argueta MD, 75 mg at 03/19/17 0906  •  cyclobenzaprine (FLEXERIL) tablet 10 mg, 10 mg, Oral, BID, Portillo Argueta MD, 10 mg at 03/19/17 1726  •  dextrose (D50W) solution 25 g, 25 g, Intravenous, Q15 Min PRN, Portillo Argueta MD  •  dextrose (GLUTOSE) oral gel 15 g, 15 g, Oral, Q15 Min PRN, Portillo Argueta MD  •  enoxaparin (LOVENOX) syringe 40 mg, 40 mg, Subcutaneous, Q24H, Yadira Delarosa MD, 40 mg at 03/19/17 0907  •  furosemide (LASIX) tablet 40 mg, 40 mg, Oral, Daily, Alban Vázquez MD, 40 mg at  03/19/17 0954  •  gabapentin (NEURONTIN) capsule 800 mg, 800 mg, Oral, Q8H, Portillo Argueta MD, 800 mg at 03/19/17 2111  •  glucagon (human recombinant) (GLUCAGEN DIAGNOSTIC) injection 1 mg, 1 mg, Subcutaneous, Q15 Min PRN, Portillo Argueta MD  •  insulin aspart (novoLOG) injection 0-24 Units, 0-24 Units, Subcutaneous, 4x Daily AC & at Bedtime, Portillo Argueta MD, 16 Units at 03/19/17 2112  •  insulin aspart (novoLOG) injection 30 Units, 30 Units, Subcutaneous, TID With Meals, Alban Vázquez MD, 30 Units at 03/19/17 1726  •  insulin detemir (LEVEMIR) injection 95 Units, 95 Units, Subcutaneous, Nightly, Yadira Delarosa MD, 95 Units at 03/19/17 2009  •  insulin detemir (LEVEMIR) injection 95 Units, 95 Units, Subcutaneous, QAM, Yadira Delarosa MD, 95 Units at 03/19/17 0906  •  ipratropium-albuterol (DUO-NEB) nebulizer solution 3 mL, 3 mL, Nebulization, 4x Daily - RT, Portillo Argueta MD, 3 mL at 03/19/17 1828  •  lisinopril (PRINIVIL,ZESTRIL) tablet 10 mg, 10 mg, Oral, Q24H, Portillo Argueta MD, 10 mg at 03/19/17 0907  •  metoprolol tartrate (LOPRESSOR) tablet 50 mg, 50 mg, Oral, BID, Portillo Argueta MD, 50 mg at 03/19/17 1726  •  nicotine (NICODERM CQ) 14 MG/24HR patch 1 patch, 1 patch, Transdermal, Q24H, Portillo Argueta MD, 1 patch at 03/19/17 2105  •  nitroglycerin (NITROSTAT) SL tablet 0.4 mg, 0.4 mg, Sublingual, PRN, Portillo Argueta MD  •  ondansetron (ZOFRAN) injection 4 mg, 4 mg, Intravenous, Q6H PRN, Portillo Argueta MD  •  pantoprazole (PROTONIX) injection 40 mg, 40 mg, Intravenous, Q AM, Portillo Argueta MD, 40 mg at 03/19/17 0607  •  Pharmacy to dose vancomycin, , Does not apply, Continuous PRN, Alban Vázquez MD  •  potassium chloride (K-DUR) CR tablet 10 mEq, 10 mEq, Oral, Daily, Portillo Argueta MD, 10 mEq at 03/19/17 0909  •  predniSONE (DELTASONE) tablet 40 mg, 40 mg, Oral, Q12H, Alban Vázquez MD, 40 mg at 03/19/17 2009  •  promethazine (PHENERGAN) tablet 25 mg, 25 mg, Oral, Q6H PRN, Portillo Argueta MD  •   sodium chloride 0.9 % flush 1-10 mL, 1-10 mL, Intravenous, PRN, Portillo Argueta MD, 10 mL at 03/16/17 2216  •  vancomycin (VANCOCIN) 2,000 mg in sodium chloride 0.9 % 500 mL IVPB, 15 mg/kg, Intravenous, Q18H, Alban Vázquez MD, 2,000 mg at 03/19/17 2111     Diagnostic Data      I reviewed the patient's new clinical results and imaging.      Assessment/Plan     Active Hospital Problems (** Indicates Principal Problem)    Diagnosis Date Noted   • **Pneumonia of both lungs due to methicillin resistant Staphylococcus aureus (MRSA) [J15.212] 03/16/2017     -Stop azithromycin since atypical are negative Ceftriaxone 1 g IV daily today is day 4. Will add vancomycin day 2 for MRSA coverage, DuoNebs  - Sputum culture -show MRSA, mycoplasma PCR -pending; Legionella ag - negative, pneumococcal ag - negative.  - Lactic acid 3.1 on admission, repeat 2.0; now 1.2  - CRP 28.30, trending down today is 12.   - Incentive spirometry, supplemental oxygen PRN     • Sepsis due to pneumonia [J18.9, A41.9] 03/17/2017     -Ceftriaxone 1 g IV daily, plus vancomycin DuoNebs  - Sputum culture - positive for MARSA. mycoplasma PCR -pending;   - Legionella ag - negative, pneumococcal ag - negative.  - Lactic acid 3.1 on admission, repeat 2.0; now 1.2  - CRP 28.30, trend CRP  - Incentive spirometry, supplemental oxygen PRN     • Laryngitis, acute [J04.0] 03/17/2017   • Acute respiratory failure with hypoxia [J96.01] 03/16/2017     Oxygen supplementation, ABG, duo-nebs  Taper steroid to prednisone 40mg BID.      • Prophylactic hospital isolation [Z41.8] 03/16/2017     History of CRE infection continue contact precautions     • Diabetes mellitus type 2 in obese [E11.9, E66.9] 08/24/2016     - Increase night time Levemir to 95u qhs;  Levemir 90 units qAM, novolog SSI  -will add meal time insulin which she takes at home.   -blood sugar is better control today.          • Chronic obstructive pulmonary disease [J44.9] 08/24/2016     Duo Nebs, Symbicort,  oxygen supplementation     • Tobacco dependence syndrome [F17.200]      1/2ppd  - Nicotine patch ordered     • Sleep apnea [G47.30]      CPAP ordered     • Dyslipidemia [E78.5]      Home atorvastatin     • Morbid obesity due to excess calories [E66.01]      BMI 44.5, dietary consult ordered     • Essential hypertension [I10]      Home lisinopril and metoprolol, will administer IV lasix 80 mg today, 40 mg BID thereafter     • Asthma [J45.909]    • GERD (gastroesophageal reflux disease) [K21.9]      Protonix     • Peripheral vascular disease [I73.9]    • Coronary arteriosclerosis [I25.10]      S/p CABG X3 in 2013, continue home Plavix, Metoprolol, and Atorvastatin        Resolved Hospital Problems    Diagnosis Date Noted Date Resolved   No resolved problems to display.     I have reviewed the notes, assessments, and/or procedures performed by Dr. Vázquez, I concur with his documentation of Yamileth Slater.      DVT prophylaxis: Heparin      Disposition:Home with home health          This document has been electronically signed by Charlette Bowden MD on March 19, 2017 10:52 PM          This document has been electronically signed by Charlette Bowden MD on March 19, 2017 10:52 PM

## 2017-03-20 NOTE — PROGRESS NOTES
"Pharmacokinetics by Pharmacy - Vancomycin    Yamileth Slater is a 57 y.o. female  [Ht: 62.01\" (157.5 cm); Wt: 279 lb 15.8 oz (127 kg)]    Estimated Creatinine Clearance: 104.4 mL/min (by C-G formula based on Cr of 0.75).   Lab Results   Component Value Date    CREATININE 0.75 03/20/2017    CREATININE 0.81 03/19/2017    CREATININE 0.93 03/18/2017    CREATININE 0.9 12/14/2016    CREATININE 0.9 12/13/2016    CREATININE 1.3 (H) 12/12/2016      Lab Results   Component Value Date    WBC 10.33 (H) 03/20/2017    WBC 10.25 (H) 03/19/2017    WBC 13.97 (H) 03/18/2017      Temp Readings from Last 1 Encounters:   03/20/17 96.7 °F (35.9 °C) (Oral)      Lab Results   Component Value Date    VANCOTROUGH 13.25 03/20/2017         Culture Results:  Microbiology Results (last 10 days)       Procedure Component Value - Date/Time      Respiratory Culture [[id number]]  (Abnormal)  (Susceptibility) Collected:  03/17/17 1130    Lab Status:  Final result Specimen:  Sputum from Cough Updated:  03/19/17 0915     Respiratory Culture        Moderate growth (3+) Staphylococcus aureus, MRSA (C)      droplet precautions requested  contact precautions requested  Methicillin resistant Staphylococcus aureus, Patient may be an isolation risk.        Gram Stain Result Moderate (3+) WBCs seen              Rare (1+) Epithelial cells per low power field      Mixed bacterial nataly     Susceptibility        Staphylococcus aureus, MRSA     JULIO CÉSAR     Clindamycin <=0.5 ug/ml Susceptible     Erythromycin <=0.5 ug/ml Susceptible     Gentamicin <=4 ug/ml Susceptible     Oxacillin >2 ug/ml Resistant     Penicillin G 2 ug/ml Resistant     Rifampin <=1 ug/ml Susceptible     Trimethoprim + Sulfamethoxazole <=0.5/9.5 ug/ml Susceptible     Vancomycin 2 ug/ml Susceptible                      Mycoplasma Pneumoniae PCR [[id number]] Collected:  03/17/17 0631    Lab Status:  Final result Specimen:  Sputum from Nasopharynx Updated:  03/17/17 0958     MYCOPLASMAE PNEUMONIAE BY " PCR Negative     Narrative:       This test is performed by utilizing real time polymerace chain recation (PCR).   This test is performed by utilizing real time polymerace chain recation (PCR).     S. Pneumo Ag Urine or CSF [[id number]]  (Normal) Collected:  03/16/17 2147    Lab Status:  Final result Specimen:  Urine from Urine, Clean Catch Updated:  03/16/17 2240     Strep Pneumo Ag Negative     Urine Culture [[id number]]  (Normal) Collected:  03/16/17 2147    Lab Status:  Final result Specimen:  Urine from Urine, Clean Catch Updated:  03/17/17 1710     Urine Culture No growth at 24 hours     Blood Culture [[id number]]  (Normal) Collected:  03/16/17 2000    Lab Status:  Preliminary result Specimen:  Blood from Hand, Right Updated:  03/19/17 2101     Blood Culture No growth at 3 days     Blood Culture [[id number]]  (Normal) Collected:  03/16/17 1950    Lab Status:  Preliminary result Specimen:  Blood from Arm, Right Updated:  03/19/17 2101     Blood Culture No growth at 3 days     Influenza Antigen [[id number]]  (Normal) Collected:  03/16/17 1204    Lab Status:  Final result Specimen:  Swab from Nasopharynx Updated:  03/16/17 1226     Influenza A Ag, EIA Negative      Influenza B Ag, EIA Negative           RESPIRATORY CULTURE   Date Value Ref Range Status   03/17/2017 Moderate growth (3+) Staphylococcus aureus, MRSA (C)  Final     Comment:     droplet precautions requested  contact precautions requested  Methicillin resistant Staphylococcus aureus, Patient may be an isolation risk.         Indication for use: MRSA Pneumonia     Current Vancomycin Dose:  2000 mg IVPB every 18 hours, day 3 of therapy.      Assessment/Plan:  Reviewed above labs and cultures.   Vancomycin trough level 13.25 at 1505.  Gaol range 15-20.  Will continue current dose. Will expect some accumulation. Pharmacy will continue to monitor renal function and adjust dose accordingly.    Светлана Lal [facility]   03/20/17 4:25 PM

## 2017-03-20 NOTE — PROGRESS NOTES
Acute Care - Physical Therapy Treatment Note  [facility]     Patient Name: Yamileth Slater  : 1960  MRN: [id number]  Today's Date: 3/20/2017  Onset of Illness/Injury or Date of Surgery Date: 17  Date of Referral to PT: 17  Referring Physician: Dr. Vázquez    Admit Date: 3/16/2017    Visit Dx:    ICD-10-CM ICD-9-CM   1. Pneumonia of both lungs due to infectious organism, unspecified part of lung J18.9 483.8   2. Acute respiratory failure with hypoxia J96.01 518.81   3. Impaired physical mobility Z74.09 781.99     Patient Active Problem List   Diagnosis   • Diabetes mellitus type 2 in obese   • Chronic obstructive pulmonary disease   • Low back pain   • Vitamin D deficiency   • Type 2 diabetes mellitus   • Viral wart   • Tobacco dependence syndrome   • Surgical follow-up care   • Sleep apnea   • Shoulder joint pain   • Rash   • Peripheral vascular disease   • Pain   • Non-healing surgical wound   • Neurologic disorder associated with diabetes mellitus   • Need for vaccination   • Morbid obesity due to excess calories   • Mixed hyperlipidemia   • Lower limb anomaly   • Leukocytosis   • Kidney stone   • Infestation by Sarcoptes scabiei deepak hominis   • Infection of skin and subcutaneous tissue   • Hypertensive disorder   • History of colon polyps   • GERD (gastroesophageal reflux disease)   • Furuncle of neck   • Excoriated eczema   • Essential hypertension   • Encounter for general adult medical examination with abnormal findings   • Dysphagia   • Dyslipidemia   • Diabetes mellitus   • Coronary arteriosclerosis   • Chronic obstructive lung disease   • Chronic folliculitis   • Chest pain   • Carotid artery stenosis   • Candidiasis of vulva and vagina   • Candidiasis of mouth   • Asthma   • Anxiety states   • 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   • Abdominal wall abscess   • Uncontrolled type 2 diabetes mellitus with complication, with long-term current use of insulin   •  Acute respiratory failure with hypoxia   • Pneumonia of both lungs due to methicillin resistant Staphylococcus aureus (MRSA)   • Prophylactic hospital isolation   • Pneumonia of both lungs due to infectious organism   • Laryngitis, acute               Adult Rehabilitation Note       03/20/17 1543          Rehab Assessment/Intervention    Discipline physical therapist  -CZ      Document Type therapy note (daily note)  -CZ      Subjective Information agree to therapy;no complaints  -CZ      Recorded by [CZ] Christopher Arnett, PT      Vital Signs    Pre Systolic BP Rehab 169  -CZ      Pre Treatment Diastolic BP 74  -CZ      Intra Treatment Diastolic   -CZ      Post Systolic BP Rehab 81  -CZ      Post Treatment Diastolic BP 68  -CZ      Pretreatment Heart Rate (beats/min) 93   during standing ther ex.  -CZ      Intratreatment Heart Rate (beats/min) 81  -CZ      Posttreatment Heart Rate (beats/min) 72  -CZ      Pre SpO2 (%) 95  -CZ      O2 Delivery Pre Treatment room air  -CZ      Intra SpO2 (%) 93   during standing ther ex.  -CZ      O2 Delivery Intra Treatment supplemental O2  -CZ      Post SpO2 (%) 96  -CZ      O2 Delivery Post Treatment room air  -CZ      Recorded by [CZ] Christopher Arnett, PT      Cognitive Assessment/Intervention    Current Cognitive/Communication Assessment functional  -CZ      Follows Commands/Answers Questions 100% of the time  -CZ      Personal Safety WNL/WFL  -CZ      Personal Safety Interventions nonskid shoes/slippers when out of bed  -CZ      Recorded by [CZ] Christopher Arnett, PT      Bed Mobility, Assessment/Treatment    Bed Mob, Supine to Sit, Lynn independent  -CZ      Bed Mob, Sit to Supine, Lynn independent  -CZ      Recorded by [CZ] Christopher Arnett, PT      Transfer Assessment/Treatment    Transfers, Sit-Stand Lynn independent  -CZ      Transfers, Stand-Sit Lynn independent  -CZ      Recorded by [CZ] Christopher Arnett, PT      Therapy Exercises     Bilateral Lower Extremities AROM:;20 reps;standing;hip abduction/adduction;hip extension;hip flexion;heel slides  -CZ      Recorded by [CZ] Christopher Arnett, PT      Positioning and Restraints    Pre-Treatment Position in bed  -CZ      Post Treatment Position bed  -CZ      In Bed sitting EOB;call light within reach  -CZ      Recorded by [CZ] Christopher Arnett, PT        User Key  (r) = Recorded By, (t) = Taken By, (c) = Cosigned By    Initials Name Effective Dates    CZ Christopher Arnett, PT 02/17/17 -                 IP PT Goals       03/20/17 1543 03/20/17 1005       Gait Training PT STG    Gait Training Goal PT STG, Date Established  03/20/17  -CZ     Gait Training Goal PT STG, Time to Achieve  5 - 7 days  -CZ     Gait Training Goal PT STG, Schley Level  independent  -CZ     Gait Training Goal PT STG, Distance to Achieve  100  -CZ     Gait Training Goal PT STG, Additional Goal  Ambulating without an AD.  -CZ     Gait Training Goal PT STG, Date Goal Reviewed 03/20/17  -CZ      Gait Training Goal PT STG, Outcome goal ongoing  -CZ      Stair Training PT STG    Stair Training Goal PT STG, Date Established  03/20/17  -CZ     Stair Training Goal PT STG, Time to Achieve  5 - 7 days  -CZ     Stair Training Goal PT STG, Number of Steps  4   Patient has stairs at her daughter's house.  -CZ     Stair Training Goal PT STG, Schley Level  independent  -CZ     Stair Training Goal PT STG, Assist Device  1 handrail  -CZ     Stair Training Goal PT STG, Date Goal Reviewed 03/20/17  -CZ      Stair Training Goal PT STG, Outcome goal ongoing  -CZ      Physical Therapy PT LTG    Physical Therapy PT LTG, Date Established  03/20/17  -CZ     Physical Therapy PT LTG, Activity Type  Tinetti fall risk assessment.   -CZ     Physical Therapy PT LTG, Addisional Goal  Score 28/28 or low fall risk.  -CZ     Physical Therapy PT LTG, Date Goal Reviewed 03/20/17  -CZ        User Key  (r) = Recorded By, (t) = Taken By, (c) = Cosigned By     Initials Name Provider Type    CZ Christopher Arnett PT Physical Therapist          Physical Therapy Education     Title: PT OT SLP Therapies (Active)     Topic: Physical Therapy (Active)     Point: Mobility training (Active)    Learning Progress Summary    Learner Readiness Method Response Comment Documented by Status   Patient Acceptance E NR Patient educated on the importance of increasing her activity tolerance and maintaining gains once she is discharged home.  03/20/17 1313 Active               Point: Body mechanics (Active)    Learning Progress Summary    Learner Readiness Method Response Comment Documented by Status   Patient Acceptance E,D NR Patient educated on proper weightshifting technique during SLS to avoid LOB. Patient tends to employ hip strategy before ankle strategies.  03/20/17 1626 Active                      User Key     Initials Effective Dates Name Provider Type Discipline     02/17/17 -  Christopher Arnett PT Physical Therapist PT                    PT Recommendation and Plan  Anticipated Discharge Disposition: home  Planned Therapy Interventions: gait training, stair training, strengthening  PT Frequency: other (see comments) (5-14x/week)  Plan of Care Review  Plan Of Care Reviewed With: patient  Progress: no change  Outcome Summary/Follow up Plan: Patient cooperative and alert. O2 sats remain >93% on RA during standing ther ex.  Patient requires cues during single leg stance to increase weightshifting toward stance limb to avoid LOB.  Continue skilled PT.          Outcome Measures       03/20/17 1005          Tinetti Assessment    Tinetti Assessment yes  -CZ      Sitting Balance 1  -CZ      Arises 2  -CZ      Attempts to Rise 2  -CZ      Immediate Standing Balance (first 5 sec) 2  -CZ      Standing Balance 2  -CZ      Sternal Nudge (feet close together) 2  -CZ      Eyes Closed (feet close together) 1  -CZ      Turning 360 Degrees- Steps 1  -CZ      Turning 360 Degrees- Steadiness 1  -CZ   "    Sitting Down 2  -CZ      Tinetti Balance Score 16  -CZ      Gait Initiation (immediate after told \"go\") 1  -CZ      Step Length- Right Swing 1  -CZ      Step Length- Left Swing 1  -CZ      Foot Clearance- Right Foot 0  -CZ      Foot Clearance- Left Foot 0  -CZ      Step Symmetry 1  -CZ      Step Continuity 1  -CZ      Path (excursion) 2  -CZ      Trunk 2  -CZ      Base of Support 1  -CZ      Gait Score 10  -CZ      Tinetti Total Score 26  -CZ      Functional Assessment    Outcome Measure Options Tinetti  -CZ        User Key  (r) = Recorded By, (t) = Taken By, (c) = Cosigned By    Initials Name Provider Type    CZ Christopher Arnett, PT Physical Therapist           Time Calculation:         PT Charges       03/20/17 1631 03/20/17 1321       Time Calculation    Start Time 1543  -CZ 1005  -CZ     Stop Time 1619  -CZ 1059  -CZ     Time Calculation (min) 36 min  -CZ 54 min  -CZ     PT Received On 03/20/17  -CZ 03/20/17  -CZ     PT Goal Re-Cert Due Date  04/02/17  -CZ     Time Calculation- PT    Total Timed Code Minutes- PT 36 minute(s)  -CZ 39 minute(s)  -CZ       User Key  (r) = Recorded By, (t) = Taken By, (c) = Cosigned By    Initials Name Provider Type    CZ Christopher Arnett, PT Physical Therapist          Therapy Charges for Today     Code Description Service Date Service Provider Modifiers Qty    35033483699 HC PT MOBILITY CURRENT 3/20/2017 Christopher Arnett, PT GP, CI 1    67056365791 HC PT MOBILITY PROJECTED 3/20/2017 Christopher Arnett, PT GP, CH 1    38394107617 HC PT EVAL MOD COMPLEXITY 1 3/20/2017 Christopher Arnett, PT GP 1    04455929723 HC GAIT TRAINING EA 15 MIN 3/20/2017 Christopher Arnett, PT GP 1    60124632157 HC PT THERAPEUTIC ACT EA 15 MIN 3/20/2017 Christopher Arnett, PT GP 2    88482134217 HC PT THERAPEUTIC ACT EA 15 MIN 3/20/2017 Christopher Arnett, PT GP 2          PT G-Codes  PT Professional Judgement Used?: Yes  Outcome Measure Options: Tinetti  Score: 26  Functional Limitation: Mobility: Walking and moving " around  Mobility: Walking and Moving Around Current Status (): At least 1 percent but less than 20 percent impaired, limited or restricted  Mobility: Walking and Moving Around Goal Status (): 0 percent impaired, limited or restricted    Christopher Arnett, PT  3/20/2017

## 2017-03-20 NOTE — PLAN OF CARE
Problem: Pneumonia (Adult)  Goal: Signs and Symptoms of Listed Potential Problems Will be Absent or Manageable (Pneumonia)  Outcome: Ongoing (interventions implemented as appropriate)  Pt resting in bed this shift

## 2017-03-20 NOTE — PLAN OF CARE
Problem: Patient Care Overview (Adult)  Goal: Plan of Care Review  Outcome: Ongoing (interventions implemented as appropriate)    03/20/17 1005   Coping/Psychosocial Response Interventions   Plan Of Care Reviewed With patient   Outcome Evaluation   Outcome Summary/Follow up Plan Initial PT evaluation complete. Patient demonstrates independence with transfers, bed mobility and gait (without an AD, for short distances). Patient maintains O2 saturation of 91-96% on RA with gait and with seated ther ex. Nurse notified. Patient would benefit from continued skilled PT to increase activity tolerance and lower fall risk.          Problem: Inpatient Physical Therapy  Goal: Gait Training Goal STG- PT  Outcome: Ongoing (interventions implemented as appropriate)    03/20/17 1005   Gait Training PT STG   Gait Training Goal PT STG, Date Established 03/20/17   Gait Training Goal PT STG, Time to Achieve 5 - 7 days   Gait Training Goal PT STG, [place] Level independent   Gait Training Goal PT STG, Distance to Achieve 100   Gait Training Goal PT STG, Additional Goal Ambulating without an AD.       Goal: Stair Training Goal STG- PT  Outcome: Ongoing (interventions implemented as appropriate)    03/20/17 1005   Stair Training PT STG   Stair Training Goal PT STG, Date Established 03/20/17   Stair Training Goal PT STG, Time to Achieve 5 - 7 days   Stair Training Goal PT STG, Number of Steps 4  (Patient has stairs at her daughter's house.)         03/20/17 1005   Stair Training PT STG   Stair Training Goal PT STG, Date Established 03/20/17   Stair Training Goal PT STG, Time to Achieve 5 - 7 days   Stair Training Goal PT STG, Number of Steps 4  (Patient has stairs at her daughter's house.)   Stair Training Goal PT STG, [place] Level independent   Stair Training Goal PT STG, Assist Device 1 handrail       Goal: Physical Therapy Goal LTG- PT  Outcome: Ongoing (interventions implemented as appropriate)    03/20/17 1005   Physical  Therapy PT LTG   Physical Therapy PT LTG, Date Established 03/20/17   Physical Therapy PT LTG, Activity Type Tinetti fall risk assessment.    Physical Therapy PT LTG, Addisional Goal Score 28/28 or low fall risk.

## 2017-03-20 NOTE — PLAN OF CARE
Problem: Patient Care Overview (Adult)  Goal: Plan of Care Review  Outcome: Ongoing (interventions implemented as appropriate)    03/20/17 1543   Coping/Psychosocial Response Interventions   Plan Of Care Reviewed With patient   Patient Care Overview   Progress no change   Outcome Evaluation   Outcome Summary/Follow up Plan Patient cooperative and alert. O2 sats remain >93% on RA during standing ther ex. Patient requires cues during single leg stance to increase weightshifting toward stance limb to avoid LOB. Continue skilled PT.         Problem: Inpatient Physical Therapy  Goal: Gait Training Goal STG- PT  Outcome: Ongoing (interventions implemented as appropriate)    03/20/17 1005 03/20/17 1543   Gait Training PT STG   Gait Training Goal PT STG, Date Established 03/20/17 --    Gait Training Goal PT STG, Time to Achieve 5 - 7 days --    Gait Training Goal PT STG, [place] Level independent --    Gait Training Goal PT STG, Distance to Achieve 100 --    Gait Training Goal PT STG, Additional Goal Ambulating without an AD. --    Gait Training Goal PT STG, Date Goal Reviewed --  03/20/17   Gait Training Goal PT STG, Outcome --  goal ongoing       Goal: Stair Training Goal STG- PT  Outcome: Ongoing (interventions implemented as appropriate)    03/20/17 1005 03/20/17 1543   Stair Training PT STG   Stair Training Goal PT STG, Date Established 03/20/17 --    Stair Training Goal PT STG, Time to Achieve 5 - 7 days --    Stair Training Goal PT STG, Number of Steps 4  (Patient has stairs at her daughter's house.) --    Stair Training Goal PT STG, [place] Level independent --    Stair Training Goal PT STG, Assist Device 1 handrail --    Stair Training Goal PT STG, Date Goal Reviewed --  03/20/17   Stair Training Goal PT STG, Outcome --  goal ongoing       Goal: Physical Therapy Goal LTG- PT  Outcome: Ongoing (interventions implemented as appropriate)    03/20/17 1005 03/20/17 1543   Physical Therapy PT LTG   Physical  Therapy PT LTG, Date Established 03/20/17 --    Physical Therapy PT LTG, Activity Type Tinetti fall risk assessment.  --    Physical Therapy PT LTG, Addisional Goal Score 28/28 or low fall risk. --    Physical Therapy PT LTG, Date Goal Reviewed --  03/20/17

## 2017-03-20 NOTE — PROGRESS NOTES
FAMILY MEDICINE DAILY PROGRESS NOTE  NAME: Yamileth Slater  : 1960  MRN: [id number]     LOS: 4 days     PROVIDER OF SERVICE: Yadira Delarosa MD    Chief Complaint: Pneumonia of both lungs due to methicillin resistant Staphylococcus aureus (MRSA)    Subjective:     Interval History:  History taken from: patient  Patient admitted with acute hypoxic respiratory failure and sepsis d.t. MRSA pna, states her breathing is much improved with supplemental oxygen, she still has cough.  She has had desaturation to 84% on CPAP on 3/18/17 at 0738 , will get overnight pulse oximetry to qualify her for O2 at night with CPAP.     Review of Systems:   Review of Systems   Constitutional: Negative for activity change, appetite change, chills and fever.   HENT: Negative for congestion and trouble swallowing.    Eyes: Negative for photophobia and visual disturbance.   Respiratory: Positive for cough, shortness of breath (much improved since admission) and wheezing.    Cardiovascular: Positive for leg swelling. Negative for chest pain and palpitations.   Gastrointestinal: Negative for abdominal pain, constipation, diarrhea, nausea and vomiting.   Endocrine: Negative for polydipsia and polyphagia.   Genitourinary: Negative for difficulty urinating, dysuria and frequency.   Musculoskeletal: Negative for arthralgias and gait problem.   Skin: Negative for color change, pallor and rash.   Neurological: Negative for weakness, light-headedness and headaches.   Psychiatric/Behavioral: Negative for sleep disturbance. The patient is not nervous/anxious.        Objective:     Vital Signs  Temp:  [96.2 °F (35.7 °C)-97.6 °F (36.4 °C)] 96.2 °F (35.7 °C)  Heart Rate:  [64-90] 76  Resp:  [18-21] 20  BP: (134-174)/(61-82) 174/82  Body mass index is 51.2 kg/(m^2).     Physical Exam  Physical Exam   Constitutional: She is oriented to person, place, and time. She appears well-developed and well-nourished. She is active and cooperative. No  distress.   Obese   HEENT: NCAT, EOMI, PERRL   Cardiovascular: Normal rate, regular rhythm, normal heart sounds and intact distal pulses.    Pulmonary/Chest: Effort normal. No respiratory distress. She has decreased breath sounds. She has wheezes (throughout). She has rhonchi in the right lower field improved and the left lower field improved.   Abdominal: Soft. Bowel sounds are normal. She exhibits no distension. There is no tenderness.   Genitourinary: There is no rash, tenderness or lesion on the right labia. There is no rash, tenderness or lesion on the left labia.   Genitourinary Comments: No abscess in groin   Musculoskeletal: Normal range of motion. She exhibits edema (1+ pitting bilateral lower ext).   Neurological: She is alert and oriented to person, place, and time.   Skin: Skin is warm and dry.   Multiple excoriations on face, arms, abdomen, legs without evidence of infection.   Vitals reviewed.      Medication Review    Current Facility-Administered Medications:   •  acetaminophen (TYLENOL) tablet 650 mg, 650 mg, Oral, Q4H PRN, Portillo Argueta MD  •  atorvastatin (LIPITOR) tablet 20 mg, 20 mg, Oral, Daily, Portillo Argueta MD, 20 mg at 03/19/17 0906  •  bisacodyl (DULCOLAX) EC tablet 5 mg, 5 mg, Oral, Daily PRN, Portillo Argueta MD  •  bisacodyl (DULCOLAX) suppository 10 mg, 10 mg, Rectal, Daily PRN, Portillo Argueta MD  •  budesonide-formoterol (SYMBICORT) 160-4.5 MCG/ACT inhaler 2 puff, 2 puff, Inhalation, BID - RT, Portillo Argueta MD, 2 puff at 03/20/17 0653  •  cefTRIAXone (ROCEPHIN) 1 g/100 mL 0.9% NS (MBP), 1 g, Intravenous, Q24H, Portillo Argueta MD, 1 g at 03/19/17 0906  •  cetirizine (zyrTEC) tablet 10 mg, 10 mg, Oral, Daily, Portillo Argueta MD, 10 mg at 03/19/17 0907  •  clopidogrel (PLAVIX) tablet 75 mg, 75 mg, Oral, Daily, Portillo Argueta MD, 75 mg at 03/19/17 0906  •  cyclobenzaprine (FLEXERIL) tablet 10 mg, 10 mg, Oral, BID, Portillo Argueta MD, 10 mg at 03/19/17 1726  •  dextrose (D50W) solution 25 g,  25 g, Intravenous, Q15 Min PRN, Portillo Argueta MD  •  dextrose (GLUTOSE) oral gel 15 g, 15 g, Oral, Q15 Min PRN, Portillo Argueta MD  •  enoxaparin (LOVENOX) syringe 40 mg, 40 mg, Subcutaneous, Q24H, Yadira Delarosa MD, 40 mg at 03/19/17 0907  •  furosemide (LASIX) tablet 40 mg, 40 mg, Oral, Daily, Alban Vázquez MD, 40 mg at 03/19/17 0954  •  gabapentin (NEURONTIN) capsule 800 mg, 800 mg, Oral, Q8H, Portillo Argueta MD, 800 mg at 03/20/17 0536  •  glucagon (human recombinant) (GLUCAGEN DIAGNOSTIC) injection 1 mg, 1 mg, Subcutaneous, Q15 Min PRN, Portillo Argueta MD  •  insulin aspart (novoLOG) injection 0-24 Units, 0-24 Units, Subcutaneous, 4x Daily AC & at Bedtime, Portillo Argueta MD, 16 Units at 03/19/17 2112  •  insulin aspart (novoLOG) injection 30 Units, 30 Units, Subcutaneous, TID With Meals, Alban Vázquez MD, 30 Units at 03/19/17 1726  •  insulin detemir (LEVEMIR) injection 95 Units, 95 Units, Subcutaneous, Nightly, Yadira Delarosa MD, 95 Units at 03/19/17 2009  •  insulin detemir (LEVEMIR) injection 95 Units, 95 Units, Subcutaneous, QAM, Yadira Delarosa MD, 95 Units at 03/19/17 0906  •  ipratropium-albuterol (DUO-NEB) nebulizer solution 3 mL, 3 mL, Nebulization, 4x Daily - RT, Portillo Argueta MD, 3 mL at 03/20/17 0652  •  lisinopril (PRINIVIL,ZESTRIL) tablet 10 mg, 10 mg, Oral, Q24H, Portillo Argueta MD, 10 mg at 03/19/17 0907  •  metoprolol tartrate (LOPRESSOR) tablet 50 mg, 50 mg, Oral, BID, Portillo Argueta MD, 50 mg at 03/19/17 1726  •  nicotine (NICODERM CQ) 14 MG/24HR patch 1 patch, 1 patch, Transdermal, Q24H, Portillo Argueta MD, 1 patch at 03/19/17 1783  •  nitroglycerin (NITROSTAT) SL tablet 0.4 mg, 0.4 mg, Sublingual, PRN, Portillo Argueta MD  •  ondansetron (ZOFRAN) injection 4 mg, 4 mg, Intravenous, Q6H PRN, Portillo Argueta MD  •  pantoprazole (PROTONIX) injection 40 mg, 40 mg, Intravenous, Q AM, Portillo Argueta MD, 40 mg at 03/20/17 2982  •  Pharmacy to dose vancomycin, , Does not apply,  Continuous PRN, Alban Vázquez MD  •  potassium chloride (K-DUR) CR tablet 10 mEq, 10 mEq, Oral, Daily, Portillo Argueta MD, 10 mEq at 03/19/17 0909  •  predniSONE (DELTASONE) tablet 40 mg, 40 mg, Oral, Q12H, Alban Vázquez MD, 40 mg at 03/19/17 2009  •  promethazine (PHENERGAN) tablet 25 mg, 25 mg, Oral, Q6H PRN, Portillo Argueta MD  •  sodium chloride 0.9 % flush 1-10 mL, 1-10 mL, Intravenous, PRN, Portillo Argueta MD, 10 mL at 03/16/17 2216  •  vancomycin (VANCOCIN) 2,000 mg in sodium chloride 0.9 % 500 mL IVPB, 15 mg/kg, Intravenous, Q18H, Alban Vázquez MD, Stopped at 03/20/17 0130     Diagnostic Data    Lab Results (last 24 hours)     Procedure Component Value Units Date/Time    POC Glucose Fingerstick [[id number]]  (Normal) Collected:  03/19/17 0700    Specimen:  Blood Updated:  03/19/17 0722     Glucose 124 mg/dL       RN NotifiedMeter: [id number]: [id number] BECCA MONACO       CBC Auto Differential [[id number]]  (Abnormal) Collected:  03/19/17 0701    Specimen:  Blood Updated:  03/19/17 0758     WBC 10.25 (H) 10*3/mm3      RBC 3.93 10*6/mm3      Hemoglobin 9.2 (L) g/dL      Hematocrit 29.9 (L) %      MCV 76.1 (L) fL      MCH 23.4 (L) pg      MCHC 30.8 (L) g/dL      RDW 17.6 (H) %      RDW-SD 48.6 (H) fl      MPV 9.1 fL      Platelets 332 10*3/mm3      Neutrophil % 66.7 %      Lymphocyte % 23.9 %      Monocyte % 5.3 %      Eosinophil % 0.2 %      Basophil % 0.2 %      Immature Grans % 3.7 (H) %      Neutrophils, Absolute 6.84 10*3/mm3      Lymphocytes, Absolute 2.45 10*3/mm3      Monocytes, Absolute 0.54 10*3/mm3      Eosinophils, Absolute 0.02 10*3/mm3      Basophils, Absolute 0.02 10*3/mm3      Immature Grans, Absolute 0.38 (H) 10*3/mm3     CBC & Differential [[id number]] Collected:  03/19/17 0701    Specimen:  Blood Updated:  03/19/17 0758    Narrative:       The following orders were created for panel order CBC & Differential.  Procedure                               Abnormality         Status                      ---------                               -----------         ------                     CBC Auto Differential[[id number]]         Abnormal            Final result                 Please view results for these tests on the individual orders.    Comprehensive Metabolic Panel [[id number]]  (Abnormal) Collected:  03/19/17 0701    Specimen:  Blood Updated:  03/19/17 0806     Glucose 117 (H) mg/dL      BUN 28 (H) mg/dL      Creatinine 0.81 mg/dL      Sodium 142 mmol/L      Potassium 4.5 mmol/L      Chloride 103 mmol/L      CO2 27.0 mmol/L      Calcium 8.2 (L) mg/dL      Total Protein 6.9 g/dL      Albumin 3.30 (L) g/dL      ALT (SGPT) 32 U/L      AST (SGOT) 54 (H) U/L      Alkaline Phosphatase 135 (H) U/L      Total Bilirubin 0.3 mg/dL      eGFR Non African Amer 73 mL/min/1.73      Globulin 3.6 (H) gm/dL      A/G Ratio 0.9 (L) g/dL      BUN/Creatinine Ratio 34.6 (H)      Anion Gap 12.0 mmol/L     Respiratory Culture [[id number]]  (Abnormal)  (Susceptibility) Collected:  03/17/17 1130    Specimen:  Sputum from Cough Updated:  03/19/17 0915     Respiratory Culture        Moderate growth (3+) Staphylococcus aureus, MRSA (C)      droplet precautions requested  contact precautions requested  Methicillin resistant Staphylococcus aureus, Patient may be an isolation risk.        Gram Stain Result Moderate (3+) WBCs seen              Rare (1+) Epithelial cells per low power field      Mixed bacterial nataly     Susceptibility      Staphylococcus aureus, MRSA     JULIO CÉSAR     Clindamycin <=0.5 ug/ml Susceptible     Erythromycin <=0.5 ug/ml Susceptible     Gentamicin <=4 ug/ml Susceptible     Oxacillin >2 ug/ml Resistant     Penicillin G 2 ug/ml Resistant     Rifampin <=1 ug/ml Susceptible     Trimethoprim + Sulfamethoxazole <=0.5/9.5 ug/ml Susceptible     Vancomycin 2 ug/ml Susceptible                    Blood Culture [[id number]]  (Normal) Collected:  03/16/17 2000    Specimen:  Blood from Hand, Right Updated:  03/19/17 2101     Blood Culture  No growth at 3 days     Blood Culture [[id number]]  (Normal) Collected:  03/16/17 1950    Specimen:  Blood from Arm, Right Updated:  03/19/17 2101     Blood Culture No growth at 3 days     POC Glucose Fingerstick [[id number]]  (Abnormal) Collected:  03/19/17 1130    Specimen:  Blood Updated:  03/20/17 0358     Glucose 242 (H) mg/dL       Sliding Scale AdminRN NotifiedMeter: [id number]: [id number] FIELDS        POC Glucose Fingerstick [[id number]]  (Abnormal) Collected:  03/19/17 2010    Specimen:  Blood Updated:  03/20/17 0359     Glucose 343 (H) mg/dL       Sliding Scale AdminMeter: [id number]: [id number] [facility]       Comprehensive Metabolic Panel [[id number]] Collected:  03/20/17 0613    Specimen:  Blood Updated:  03/20/17 0632    C-reactive Protein [[id number]] Collected:  03/20/17 0613    Specimen:  Blood Updated:  03/20/17 0632    CBC & Differential [[id number]] Collected:  03/20/17 0613    Specimen:  Blood Updated:  03/20/17 0649    Narrative:       The following orders were created for panel order CBC & Differential.  Procedure                               Abnormality         Status                     ---------                               -----------         ------                     CBC Auto Differential[[id number]]         Abnormal            Final result                 Please view results for these tests on the individual orders.    CBC Auto Differential [[id number]]  (Abnormal) Collected:  03/20/17 0613    Specimen:  Blood Updated:  03/20/17 0649     WBC 10.33 (H) 10*3/mm3      RBC 3.99 10*6/mm3      Hemoglobin 9.3 (L) g/dL      Hematocrit 30.4 (L) %      MCV 76.2 (L) fL      MCH 23.3 (L) pg      MCHC 30.6 (L) g/dL      RDW 18.0 (H) %      RDW-SD 49.8 (H) fl      MPV 8.3 fL      Platelets 328 10*3/mm3      Neutrophil % 72.0 %      Lymphocyte % 17.4 %      Monocyte % 6.1 %      Eosinophil % 0.0 %      Basophil % 0.2 %      Immature Grans % 4.3 (H) %      Neutrophils, Absolute 7.44 10*3/mm3       Lymphocytes, Absolute 1.80 10*3/mm3      Monocytes, Absolute 0.63 10*3/mm3      Eosinophils, Absolute 0.00 10*3/mm3      Basophils, Absolute 0.02 10*3/mm3      Immature Grans, Absolute 0.44 (H) 10*3/mm3         I reviewed the patient's new clinical results.    Assessment/Plan:     Active Hospital Problems (** Indicates Principal Problem)    Diagnosis Date Noted   • **Pneumonia of both lungs due to methicillin resistant Staphylococcus aureus (MRSA) [J15.212] 03/16/2017     Priority: Medium     - Ceftriaxone 1 g IV daily day 4. Vancomycin day 3 for MRSA coverage, DuoNebs  - Sputum culture -show MRSA  - CRP 28.30, trending down today is 1.5.   - Incentive spirometry, supplemental oxygen PRN    - Will discharge home on Bactrim     • Acute respiratory failure with hypoxia [J96.01] 03/16/2017     Priority: High     - Oxygen supplementation - wean to SpO2 > 90%   - prednisone 40mg BID   - Ambulate pt today without O2, document sats  - Duo-nebs       • Laryngitis, acute [J04.0] 03/17/2017   • Prophylactic hospital isolation [Z41.8] 03/16/2017     History of CRE infection continue contact precautions     • Diabetes mellitus type 2 in obese [E11.9, E66.9] 08/24/2016     - Increase night time Levemir to 95u qhs;  Levemir 90 units qAM, novolog SSI  -will add meal time insulin which she takes at home.   -blood sugar is better control today.          • Chronic obstructive pulmonary disease [J44.9] 08/24/2016     - Duo Nebs, Symbicort, oxygen supplementation, steroid     • Tobacco dependence syndrome [F17.200]      1/2ppd  - Nicotine patch ordered     • Sleep apnea [G47.30]      CPAP ordered  - 3/18/17 desaturation to 84% on CPAP, will do overnight pulse oximetry to qualify pt for home O2     • Dyslipidemia [E78.5]      Home atorvastatin     • Morbid obesity due to excess calories [E66.01]      BMI 44.5, dietary consult ordered     • Essential hypertension [I10]      Home lisinopril and metoprolol, will administer IV lasix 80  mg today, 40 mg BID thereafter     • Asthma [J45.909]    • GERD (gastroesophageal reflux disease) [K21.9]      Protonix     • Peripheral vascular disease [I73.9]    • Coronary arteriosclerosis [I25.10]      S/p CABG X3 in 2013, continue home Plavix, Metoprolol, and Atorvastatin        Resolved Hospital Problems    Diagnosis Date Noted Date Resolved   • Sepsis due to pneumonia [J18.9, A41.9] 03/17/2017 03/20/2017     Priority: High     -Ceftriaxone 1 g IV daily, plus vancomycin DuoNebs  -  MRSA PNA  - Legionella ag - negative, pneumococcal ag - negative.  - Lactic acid 3.1 on admission, repeat 2.0; now 1.2  - CRP 28.30 on admission now 1.5  - Incentive spirometry, supplemental oxygen PRN       DVT prophylaxis: Lovenox  Code status is Full Code    Plan for disposition:Home with home health sepsis protocol when appropriate      Signature  Yadira Delarosa MD PGY2  [place] [address]  Office: [phone]    This document has been electronically signed by Yadira Delarosa MD on March 20, 2017 7:00 AM

## 2017-03-20 NOTE — DISCHARGE SUMMARY
DISCHARGE SUMMARY    NAME: Yamileth Slater   PHYSICIAN: Yadira Delarosa MD  : 1960  MRN: [id number]    ADMITTED: 3/16/2017   DISCHARGED: 3/21/2017    ADMISSION DIAGNOSES: Acute respiratory failure with hypoxia [J96.01]  Pneumonia of both lungs due to infectious organism, unspecified part of lung [J18.9]  DISCHARGE DIAGNOSES:   Active Hospital Problems (** Indicates Principal Problem)    Diagnosis Date Noted   • **Pneumonia of both lungs due to methicillin resistant Staphylococcus aureus (MRSA) [J15.212] 2017     Priority: Medium   • Prophylactic hospital isolation [Z41.8] 2017   • Diabetes mellitus type 2 in obese [E11.9, E66.9] 2016   • Chronic obstructive pulmonary disease [J44.9] 2016   • Tobacco dependence syndrome [F17.200]    • MIKE on CPAP [G47.33]    • Dyslipidemia [E78.5]    • Morbid obesity due to excess calories [E66.01]    • Essential hypertension [I10]    • Asthma [J45.909]    • GERD (gastroesophageal reflux disease) [K21.9]    • Peripheral vascular disease [I73.9]    • Coronary arteriosclerosis [I25.10]       Resolved Hospital Problems    Diagnosis Date Noted Date Resolved   • Sepsis due to pneumonia [J18.9, A41.9] 2017     Priority: High   • Acute respiratory failure with hypoxia [J96.01] 2017     Priority: High   • Laryngitis, acute [J04.0] 2017     SERVICE: Family Medicine. Attending ALAN Bowden MD, Residents: Yadira Delarosa MD,Alban Vázquez MD, Portillo Padgett MD.     CONSULTS:   Consult Orders (all)     Start     Ordered    17  Inpatient Consult to Nutrition  Once     Provider:  (Not yet assigned)    17  Family Practice - Resident (on-call MD unless specified)  Once     Specialty:  Family Medicine  Provider:  Portillo Argueta MD    17          PROCEDURES:   None    HISTORY OF PRESENT ILLNESS:   Patient is a 57 y.o. female presented with shortness  of breath, cough and fever of 103.0F the day prior to admission. Her symptoms began 4 days ago with dry cough, wheezing and sore throat.  She had progressive worsening of her cough and shortness of air, she used her neighbor's portable oxygen which helped with her breathing.  Pt was brought to the ED by her  on day 4-5 of illness.  In the ED she was given solumedrol, azithromycin and rocephin.  CXR indicated bilateral lower lobe pneumonia, she was hypoxic in the ED and placed on 3LNC.    DIAGNOSTIC DATA:     Lactic Acid: 3.1  CRP: 28.30    Imaging Results (all)     Procedure Component Value Units Date/Time    XR Chest 2 View [[id number]] Collected:  03/16/17 1130     Updated:  03/16/17 1137    Narrative:         [facility]  Patient Name: DALE FINK  ORDERING: SMILEY JEFFERS   ATTENDING:  REFERRING: SMILEY JEFFERS  ----------------------  PROCEDURE: Two-view chest  COMPARISON: 12/11/16  HISTORY: Shortness of breath  FINDINGS: Frontal and lateral views of the chest are obtained.   Devices: None  Lungs/Pleura: Lung base opacities, right worse than left,  suspicious for pneumonia or pulmonary edema. Possible small right  pleural effusion.  Cardiomediastinal structures: Heart is enlarged.  Sternal suture  wires appear stable.      Impression:       CONCLUSION:    Lung base opacities, right worse than left, suspicious for  pneumonia or pulmonary edema. Possible small right pleural  effusion.    Electronically signed by:  Umesh Parra MD  3/16/2017 11:36 AM  CDT Workstation: [id number]           HOSPITAL COURSE:  She was admitted with acute hypoxic respiratory failure due to bilateral lower lobe pneumonia, upon further lab tests she was noted to be septic with a lactic acid of 3.1.  She was continued on rocephin and azithromycin, sputum cultures and atypicals were ordered.  She was continued on supplemental oxygen, steroids and breathing treatments.  All atypicals were  negative but sputum grew MRSA sensitive to Clindamycin, Vanc, gentamicin, Bactrim.  Azithromycin was then discontinued and she was started on vancomycin.  Pt responded well to antibiotics and was stable for discharge on 3/21/17.  Of note she had desaturation to 84% during sleep while on CPAP on 3/18/17.Overnight pulse oximetry testing revealed desaturations to 79% while sleeping.  Case management was consulted for assistance in obtaining home O2 and nebulizer machine.    DISCHARGE CONDITION:   Stable    DISPOSITION: Home with home health      DISCHARGE MEDICATIONS   Yamileth Slater   Home Medication Instructions SETH:[id number]    Printed on:03/21/17 1138   Medication Information                      atorvastatin (LIPITOR) 20 MG tablet  TAKE 1 TABLET BY MOUTH DAILY.             Blood Glucose Monitoring Suppl ([employer] BLOOD GLUCOSE METER) W/DEVICE kit  1 each 3 (Three) Times a Day.             budesonide-formoterol (SYMBICORT) 160-4.5 MCG/ACT inhaler  Inhale 2 puffs 2 (Two) Times a Day.             cetirizine (zyrTEC) 10 MG tablet  Take 1 tablet by mouth Daily.             clindamycin (CLEOCIN) 150 MG capsule  Take 3 capsules by mouth Every 6 (Six) Hours for 11 days. Indications: Pneumonia, MRSA PNA             clobetasol (CLOBEX) 0.05 % lotion  apply to skin bid x 1-2w then prn. generic             clopidogrel (PLAVIX) 75 MG tablet  TAKE 1 TABLET BY MOUTH DAILY.             cyclobenzaprine (FLEXERIL) 10 MG tablet  Take 1 tablet by mouth 2 (Two) Times a Day.             fluconazole (DIFLUCAN) 150 MG tablet  Take 1 tablet by mouth 1 (One) Time for 1 dose. Start after completion of antibiotic course             furosemide (LASIX) 40 MG tablet  Take 1 tablet by mouth Daily.             gabapentin (NEURONTIN) 800 MG tablet  Take 1 tablet by mouth 3 (Three) Times a Day.             glucose blood test strip  1 each by Other route 4 (Four) Times a Day. Use as instructed             insulin glargine (LANTUS) 100 UNIT/ML  injection  Inject 95 Units under the skin Every 12 (Twelve) Hours.             Insulin Lispro (HUMALOG KWIKPEN) 100 UNIT/ML solution pen-injector  Inject 45 Units under the skin 3 (Three) Times a Day Before Meals.             Insulin Pen Needle (NOVOFINE) 30G X 8 MM misc  As directed 4 times daily             ipratropium-albuterol (DUO-NEB) 0.5-2.5 mg/mL nebulizer  Take 3 mL by nebulization 4 (Four) Times a Day. ICD10 code J96.01             lisinopril (PRINIVIL,ZESTRIL) 10 MG tablet  TAKE 1 TABLET BY MOUTH DAILY.             metFORMIN (GLUMETZA) 1000 MG (MOD) 24 hr tablet  TAKE 1 TABLET BY MOUTH 2 (TWO) TIMES A DAY WITH MEALS.             metoprolol tartrate (LOPRESSOR) 50 MG tablet  Take 1 tablet by mouth 2 (Two) Times a Day.             naproxen (NAPROSYN) 500 MG tablet  Take 1 tablet by mouth 2 (Two) Times a Day With Meals.             nitroglycerin (NITROSTAT) 0.4 MG SL tablet  Place 1 tablet under the tongue As Needed for chest pain. Take no more than 3 doses in 15 minutes.             omeprazole-sodium bicarbonate (ZEGERID)  MG per capsule               pantoprazole (PROTONIX) 40 MG EC tablet  TAKE 1 TABLET BY MOUTH DAILY.             predniSONE (DELTASONE) 20 MG tablet  Take 2 tablets by mouth Daily.             promethazine (PHENERGAN) 25 MG tablet  Take 25 mg by mouth every 6 (six) hours as needed for nausea or vomiting.                 INSTRUCTIONS:  Activity:   Activity Instructions     Activity as Tolerated                   Diet:   Diet Instructions     Diet: Consistent Carbohydrate, Cardiac; Thin Liquids, No Restrictions       Discharge Diet:   Consistent Carbohydrate  Cardiac      Fluid Consistency:  Thin Liquids, No Restrictions               Special instructions: Patient instructed to call MD or return to ED with worsening shortness of breath, chest pain, fever greater than 100.4 degrees F or any other medical concerns..    FOLLOW UP:   Additional Instructions for the Follow-ups that You  Need to Schedule     Ambulatory Referral to Home Health    As directed    Face to Face Visit Date:  3/21/2017   Follow-up Provider for Plan of Care?:  I will be treating the patient on an ongoing basis.  Please send me the Plan of Care for signature.   Follow-up Provider:  LEON DELAROSA   Reason/Clinical Findings:  Sepsis protocol; respiratory assessments; home O2 with CPAP   Describe mobility limitations that make leaving home difficult:  unknown   Nursing/Therapeutic Services Requested:  Skilled Nursing   Skilled nursing orders:   O2 instruction Comment - Sepsis protocol  COPD management  Other      Frequency:  1 Week 1       Discharge Follow-up with PCP    As directed    Follow Up Details:  Friday 3/24/17             Follow-up Information     Follow up with [facility] .    Specialty:  Home Health Services    Contact information:    [address] [place] [address]          Follow up with Leon Delarosa MD .    Specialties:  Family Medicine, Emergency Medicine    Why:  Friday 3/24/17    Contact information:    [address] [place] [address]   [place] [address]  [phone]          Follow up with [facility] .    Specialty:  Home Health Services    Contact information:    [address] [facility]   [place] [address] [phone]        PENDING TEST RESULTS AT DISCHARGE: None    Time: Discharge 60 min    Charlette Bowden MD is the attending at time of discharge, she is aware of the patient's status and agrees with the above discharge summary.    Signature  Leon Delarosa MD PGY2  [place] [address]  Office: [phone]    This document has been electronically signed by Leon Delarosa MD on March 21, 2017 11:36 AM

## 2017-03-20 NOTE — PLAN OF CARE
Problem: Pneumonia (Adult)  Goal: Signs and Symptoms of Listed Potential Problems Will be Absent or Manageable (Pneumonia)  Outcome: Ongoing (interventions implemented as appropriate)    Problem: Diabetes, Type 2 (Adult)  Goal: Signs and Symptoms of Listed Potential Problems Will be Absent or Manageable (Diabetes, Type 2)  Outcome: Ongoing (interventions implemented as appropriate)    Problem: Patient Care Overview (Adult)  Goal: Plan of Care Review  Outcome: Ongoing (interventions implemented as appropriate)    03/20/17 0236   Coping/Psychosocial Response Interventions   Plan Of Care Reviewed With patient   Patient Care Overview   Progress no change   Outcome Evaluation   Outcome Summary/Follow up Plan pt vss.       Goal: Adult Individualization and Mutuality  Outcome: Ongoing (interventions implemented as appropriate)  Goal: Discharge Needs Assessment  Outcome: Ongoing (interventions implemented as appropriate)

## 2017-03-21 VITALS
DIASTOLIC BLOOD PRESSURE: 65 MMHG | OXYGEN SATURATION: 96 % | RESPIRATION RATE: 18 BRPM | HEART RATE: 82 BPM | SYSTOLIC BLOOD PRESSURE: 140 MMHG | TEMPERATURE: 98 F | BODY MASS INDEX: 51.52 KG/M2 | WEIGHT: 279.98 LBS | HEIGHT: 62 IN

## 2017-03-21 PROBLEM — J04.0 LARYNGITIS, ACUTE: Status: RESOLVED | Noted: 2017-03-17 | Resolved: 2017-03-21

## 2017-03-21 PROBLEM — J96.01 ACUTE RESPIRATORY FAILURE WITH HYPOXIA: Status: RESOLVED | Noted: 2017-03-16 | Resolved: 2017-03-21

## 2017-03-21 LAB
ALBUMIN SERPL-MCNC: 3.6 G/DL (ref 3.4–4.8)
ALBUMIN/GLOB SERPL: 1 G/DL (ref 1.1–1.8)
ALP SERPL-CCNC: 114 U/L (ref 38–126)
ALT SERPL W P-5'-P-CCNC: 29 U/L (ref 9–52)
ANION GAP SERPL CALCULATED.3IONS-SCNC: 11 MMOL/L (ref 5–15)
AST SERPL-CCNC: 60 U/L (ref 14–36)
BACTERIA SPEC AEROBE CULT: NORMAL
BACTERIA SPEC AEROBE CULT: NORMAL
BASOPHILS # BLD AUTO: 0.02 10*3/MM3 (ref 0–0.2)
BASOPHILS NFR BLD AUTO: 0.2 % (ref 0–2)
BILIRUB SERPL-MCNC: 0.3 MG/DL (ref 0.2–1.3)
BUN BLD-MCNC: 26 MG/DL (ref 7–21)
BUN/CREAT SERPL: 29.9 (ref 7–25)
CALCIUM SPEC-SCNC: 9.1 MG/DL (ref 8.4–10.2)
CHLORIDE SERPL-SCNC: 101 MMOL/L (ref 95–110)
CO2 SERPL-SCNC: 29 MMOL/L (ref 22–31)
CREAT BLD-MCNC: 0.87 MG/DL (ref 0.5–1)
DEPRECATED RDW RBC AUTO: 48.8 FL (ref 36.4–46.3)
EOSINOPHIL # BLD AUTO: 0.01 10*3/MM3 (ref 0–0.7)
EOSINOPHIL NFR BLD AUTO: 0.1 % (ref 0–7)
ERYTHROCYTE [DISTWIDTH] IN BLOOD BY AUTOMATED COUNT: 18 % (ref 11.5–14.5)
GFR SERPL CREATININE-BSD FRML MDRD: 67 ML/MIN/1.73 (ref 51–120)
GLOBULIN UR ELPH-MCNC: 3.5 GM/DL (ref 2.3–3.5)
GLUCOSE BLD-MCNC: 182 MG/DL (ref 60–100)
GLUCOSE BLDC GLUCOMTR-MCNC: 151 MG/DL (ref 70–130)
GLUCOSE BLDC GLUCOMTR-MCNC: 188 MG/DL (ref 70–130)
HCT VFR BLD AUTO: 32.4 % (ref 35–45)
HGB BLD-MCNC: 10 G/DL (ref 12–15.5)
IMM GRANULOCYTES # BLD: 0.35 10*3/MM3 (ref 0–0.02)
IMM GRANULOCYTES NFR BLD: 3.4 % (ref 0–0.5)
LYMPHOCYTES # BLD AUTO: 2 10*3/MM3 (ref 0.6–4.2)
LYMPHOCYTES NFR BLD AUTO: 19.4 % (ref 10–50)
MCH RBC QN AUTO: 23.4 PG (ref 26.5–34)
MCHC RBC AUTO-ENTMCNC: 30.9 G/DL (ref 31.4–36)
MCV RBC AUTO: 75.7 FL (ref 80–98)
MONOCYTES # BLD AUTO: 0.37 10*3/MM3 (ref 0–0.9)
MONOCYTES NFR BLD AUTO: 3.6 % (ref 0–12)
NEUTROPHILS # BLD AUTO: 7.55 10*3/MM3 (ref 2–8.6)
NEUTROPHILS NFR BLD AUTO: 73.3 % (ref 37–80)
PLATELET # BLD AUTO: 371 10*3/MM3 (ref 150–450)
PMV BLD AUTO: 9.1 FL (ref 8–12)
POTASSIUM BLD-SCNC: 5.2 MMOL/L (ref 3.5–5.1)
PROT SERPL-MCNC: 7.1 G/DL (ref 6.3–8.6)
RBC # BLD AUTO: 4.28 10*6/MM3 (ref 3.77–5.16)
SODIUM BLD-SCNC: 141 MMOL/L (ref 137–145)
WBC NRBC COR # BLD: 10.3 10*3/MM3 (ref 3.2–9.8)

## 2017-03-21 PROCEDURE — 97116 GAIT TRAINING THERAPY: CPT

## 2017-03-21 PROCEDURE — 94660 CPAP INITIATION&MGMT: CPT

## 2017-03-21 PROCEDURE — 99239 HOSP IP/OBS DSCHRG MGMT >30: CPT | Performed by: FAMILY MEDICINE

## 2017-03-21 PROCEDURE — 80053 COMPREHEN METABOLIC PANEL: CPT | Performed by: FAMILY MEDICINE

## 2017-03-21 PROCEDURE — 25010000002 ENOXAPARIN PER 10 MG: Performed by: FAMILY MEDICINE

## 2017-03-21 PROCEDURE — 82962 GLUCOSE BLOOD TEST: CPT

## 2017-03-21 PROCEDURE — 63710000001 INSULIN DETEMIR PER 5 UNITS: Performed by: FAMILY MEDICINE

## 2017-03-21 PROCEDURE — 85025 COMPLETE CBC W/AUTO DIFF WBC: CPT | Performed by: FAMILY MEDICINE

## 2017-03-21 PROCEDURE — 94760 N-INVAS EAR/PLS OXIMETRY 1: CPT

## 2017-03-21 PROCEDURE — 97530 THERAPEUTIC ACTIVITIES: CPT

## 2017-03-21 PROCEDURE — 94799 UNLISTED PULMONARY SVC/PX: CPT

## 2017-03-21 PROCEDURE — 63710000001 INSULIN ASPART PER 5 UNITS: Performed by: FAMILY MEDICINE

## 2017-03-21 PROCEDURE — 63710000001 PREDNISONE PER 1 MG: Performed by: FAMILY MEDICINE

## 2017-03-21 RX ORDER — FLUCONAZOLE 150 MG/1
150 TABLET ORAL ONCE
Qty: 1 TABLET | Refills: 0 | Status: SHIPPED | OUTPATIENT
Start: 2017-03-21 | End: 2017-03-21

## 2017-03-21 RX ORDER — INSULIN GLARGINE 100 [IU]/ML
95 INJECTION, SOLUTION SUBCUTANEOUS EVERY 12 HOURS
Qty: 1 EACH | Refills: 11 | Status: SHIPPED | OUTPATIENT
Start: 2017-03-21 | End: 2017-06-12 | Stop reason: SDUPTHER

## 2017-03-21 RX ORDER — PREDNISONE 20 MG/1
40 TABLET ORAL DAILY
Qty: 6 TABLET | Refills: 0 | Status: SHIPPED | OUTPATIENT
Start: 2017-03-21 | End: 2017-06-12

## 2017-03-21 RX ORDER — CLINDAMYCIN HYDROCHLORIDE 150 MG/1
450 CAPSULE ORAL EVERY 6 HOURS SCHEDULED
Qty: 132 CAPSULE | Refills: 0 | Status: SHIPPED | OUTPATIENT
Start: 2017-03-21 | End: 2017-03-31 | Stop reason: SDUPTHER

## 2017-03-21 RX ORDER — IPRATROPIUM BROMIDE AND ALBUTEROL SULFATE 2.5; .5 MG/3ML; MG/3ML
3 SOLUTION RESPIRATORY (INHALATION) 4 TIMES DAILY
Qty: 360 ML | Refills: 0 | Status: SHIPPED | OUTPATIENT
Start: 2017-03-21 | End: 2018-09-04 | Stop reason: SDUPTHER

## 2017-03-21 RX ORDER — OMEPRAZOLE AND SODIUM BICARBONATE 40; 1100 MG/1; MG/1
CAPSULE ORAL
Qty: 30 CAPSULE | Refills: 4 | OUTPATIENT
Start: 2017-03-21 | End: 2017-03-21 | Stop reason: HOSPADM

## 2017-03-21 RX ORDER — CLINDAMYCIN HYDROCHLORIDE 150 MG/1
450 CAPSULE ORAL EVERY 6 HOURS SCHEDULED
Status: DISCONTINUED | OUTPATIENT
Start: 2017-03-21 | End: 2017-03-21 | Stop reason: HOSPADM

## 2017-03-21 RX ADMIN — CYCLOBENZAPRINE HYDROCHLORIDE 10 MG: 10 TABLET, FILM COATED ORAL at 09:48

## 2017-03-21 RX ADMIN — ATORVASTATIN CALCIUM 20 MG: 40 TABLET, FILM COATED ORAL at 09:48

## 2017-03-21 RX ADMIN — INSULIN ASPART 4 UNITS: 100 INJECTION, SOLUTION INTRAVENOUS; SUBCUTANEOUS at 09:50

## 2017-03-21 RX ADMIN — Medication 10 ML: at 09:49

## 2017-03-21 RX ADMIN — GABAPENTIN 800 MG: 400 CAPSULE ORAL at 05:45

## 2017-03-21 RX ADMIN — ENOXAPARIN SODIUM 40 MG: 40 INJECTION SUBCUTANEOUS at 09:49

## 2017-03-21 RX ADMIN — CLOPIDOGREL BISULFATE 75 MG: 75 TABLET ORAL at 09:48

## 2017-03-21 RX ADMIN — INSULIN DETEMIR 95 UNITS: 100 INJECTION, SOLUTION SUBCUTANEOUS at 07:33

## 2017-03-21 RX ADMIN — PANTOPRAZOLE SODIUM 40 MG: 40 INJECTION, POWDER, FOR SOLUTION INTRAVENOUS at 05:45

## 2017-03-21 RX ADMIN — METOPROLOL TARTRATE 50 MG: 50 TABLET ORAL at 09:48

## 2017-03-21 RX ADMIN — LISINOPRIL 10 MG: 10 TABLET ORAL at 09:49

## 2017-03-21 RX ADMIN — INSULIN ASPART 16 UNITS: 100 INJECTION, SOLUTION INTRAVENOUS; SUBCUTANEOUS at 11:49

## 2017-03-21 RX ADMIN — FUROSEMIDE 40 MG: 40 TABLET ORAL at 09:49

## 2017-03-21 RX ADMIN — CLINDAMYCIN HYDROCHLORIDE 450 MG: 150 CAPSULE ORAL at 10:06

## 2017-03-21 RX ADMIN — CETIRIZINE HYDROCHLORIDE 10 MG: 10 TABLET, FILM COATED ORAL at 09:48

## 2017-03-21 RX ADMIN — IPRATROPIUM BROMIDE AND ALBUTEROL SULFATE 3 ML: 2.5; .5 SOLUTION RESPIRATORY (INHALATION) at 08:22

## 2017-03-21 RX ADMIN — INSULIN ASPART 30 UNITS: 100 INJECTION, SOLUTION INTRAVENOUS; SUBCUTANEOUS at 11:50

## 2017-03-21 RX ADMIN — BUDESONIDE AND FORMOTEROL FUMARATE DIHYDRATE 2 PUFF: 160; 4.5 AEROSOL RESPIRATORY (INHALATION) at 08:22

## 2017-03-21 RX ADMIN — PREDNISONE 40 MG: 20 TABLET ORAL at 09:49

## 2017-03-21 NOTE — PROGRESS NOTES
FAMILY MEDICINE PROGRESS NOTE  NAME: Yamileth Slater  : 1960  MRN: [id number]     LOS: 5 days   Full Code  PROVIDER OF SERVICE:     Chief Complaint:  Pneumonia of both lungs due to methicillin resistant Staphylococcus aureus (MRSA)    Subjective     Interval History:  History taken from: patient  Subjective: Patient is a 56 yo  female who was admitted for sepsis secondary to pneumonia.  Patient also has laryngitis.  She is feeling little better.  She is ready to go home.  She didn't sleep much due to the overnight pulse oximetry.    Review of Systems:   Review of Systems   Constitutional: Negative for activity change, appetite change, chills and fever.   HENT: Positive for sore throat and voice change. Negative for congestion and trouble swallowing.    Eyes: Negative for photophobia and visual disturbance.   Respiratory: Positive for cough, shortness of breath (much improved since admission) and wheezing.    Cardiovascular: Positive for leg swelling. Negative for chest pain and palpitations.   Gastrointestinal: Negative for abdominal pain, constipation, diarrhea, nausea and vomiting.   Endocrine: Negative for polydipsia and polyphagia.   Genitourinary: Negative for difficulty urinating, dysuria and frequency.   Musculoskeletal: Negative for arthralgias and gait problem.   Skin: Positive for wound (complains of abscess in groin). Negative for color change, pallor and rash.   Neurological: Negative for weakness, light-headedness and headaches.   Psychiatric/Behavioral: Negative for sleep disturbance. The patient is not nervous/anxious.        Objective     Vital Signs  Temp:  [96.7 °F (35.9 °C)-98.5 °F (36.9 °C)] 98 °F (36.7 °C)  Heart Rate:  [62-93] 82  Resp:  [18-20] 18  BP: (119-169)/(56-76) 140/65    Physical Exam  Physical Exam   Constitutional: She is oriented to person, place, and time. She appears well-developed and well-nourished. No distress.   HENT:   Voice hoarse   Cardiovascular:  Normal rate, regular rhythm and intact distal pulses.  Exam reveals no gallop and no friction rub.    Murmur heard.  Pulmonary/Chest: Effort normal. No respiratory distress. She has wheezes. She has no rales.   Diminished breath sounds in the bases   Abdominal: Soft. Bowel sounds are normal. She exhibits no distension. There is no tenderness. There is no rebound and no guarding.   Musculoskeletal: She exhibits edema.   Neurological: She is alert and oriented to person, place, and time.   Skin: She is not diaphoretic.   Psychiatric: She has a normal mood and affect. Her behavior is normal. Judgment and thought content normal.   Nursing note and vitals reviewed.      Medication Review    Current Facility-Administered Medications:   •  acetaminophen (TYLENOL) tablet 650 mg, 650 mg, Oral, Q4H PRN, Portillo Argueta MD  •  atorvastatin (LIPITOR) tablet 20 mg, 20 mg, Oral, Daily, Portillo Argueta MD, 20 mg at 03/21/17 0948  •  bisacodyl (DULCOLAX) EC tablet 5 mg, 5 mg, Oral, Daily PRN, Portillo Argueta MD  •  bisacodyl (DULCOLAX) suppository 10 mg, 10 mg, Rectal, Daily PRN, Portillo Argueta MD  •  budesonide-formoterol (SYMBICORT) 160-4.5 MCG/ACT inhaler 2 puff, 2 puff, Inhalation, BID - RT, Portillo Argueta MD, 2 puff at 03/21/17 0822  •  cetirizine (zyrTEC) tablet 10 mg, 10 mg, Oral, Daily, Portillo Argueta MD, 10 mg at 03/21/17 0948  •  clindamycin (CLEOCIN) capsule 450 mg, 450 mg, Oral, Q6H, Yadira Delarosa MD, 450 mg at 03/21/17 1006  •  clopidogrel (PLAVIX) tablet 75 mg, 75 mg, Oral, Daily, Portillo Argueta MD, 75 mg at 03/21/17 0948  •  cyclobenzaprine (FLEXERIL) tablet 10 mg, 10 mg, Oral, BID, Portillo Argueta MD, 10 mg at 03/21/17 0948  •  dextrose (D50W) solution 25 g, 25 g, Intravenous, Q15 Min PRN, Portillo Argueta MD  •  dextrose (GLUTOSE) oral gel 15 g, 15 g, Oral, Q15 Min PRN, Portillo Argueta MD  •  enoxaparin (LOVENOX) syringe 40 mg, 40 mg, Subcutaneous, Q24H, Yadira MD Isaura, 40 mg at 03/21/17 0949  •   furosemide (LASIX) tablet 40 mg, 40 mg, Oral, Daily, Alban Vázquez MD, 40 mg at 03/21/17 0949  •  gabapentin (NEURONTIN) capsule 800 mg, 800 mg, Oral, Q8H, Portillo Argueta MD, 800 mg at 03/21/17 0545  •  glucagon (human recombinant) (GLUCAGEN DIAGNOSTIC) injection 1 mg, 1 mg, Subcutaneous, Q15 Min PRN, Portillo Argueta MD  •  insulin aspart (novoLOG) injection 0-24 Units, 0-24 Units, Subcutaneous, 4x Daily AC & at Bedtime, Portillo Argueta MD, 16 Units at 03/21/17 1149  •  insulin aspart (novoLOG) injection 30 Units, 30 Units, Subcutaneous, TID With Meals, Alban Vázquez MD, 30 Units at 03/21/17 1150  •  insulin detemir (LEVEMIR) injection 95 Units, 95 Units, Subcutaneous, Nightly, Yadira Delarosa MD, 95 Units at 03/20/17 2155  •  insulin detemir (LEVEMIR) injection 95 Units, 95 Units, Subcutaneous, QAM, Yadira Delarosa MD, 95 Units at 03/21/17 0733  •  ipratropium-albuterol (DUO-NEB) nebulizer solution 3 mL, 3 mL, Nebulization, 4x Daily - RT, Portillo Argueta MD, 3 mL at 03/21/17 0822  •  lisinopril (PRINIVIL,ZESTRIL) tablet 10 mg, 10 mg, Oral, Q24H, Portillo Argueta MD, 10 mg at 03/21/17 0949  •  metoprolol tartrate (LOPRESSOR) tablet 50 mg, 50 mg, Oral, BID, Portillo Argueta MD, 50 mg at 03/21/17 0948  •  nicotine (NICODERM CQ) 14 MG/24HR patch 1 patch, 1 patch, Transdermal, Q24H, Portillo Argueta MD, 1 patch at 03/20/17 2155  •  nitroglycerin (NITROSTAT) SL tablet 0.4 mg, 0.4 mg, Sublingual, PRN, Portillo Argueta MD  •  ondansetron (ZOFRAN) injection 4 mg, 4 mg, Intravenous, Q6H PRN, Portillo Argueta MD  •  pantoprazole (PROTONIX) injection 40 mg, 40 mg, Intravenous, Q AM, Portillo Argueta MD, 40 mg at 03/21/17 0545  •  predniSONE (DELTASONE) tablet 40 mg, 40 mg, Oral, Q12H, Alban Vázquez MD, 40 mg at 03/21/17 0949  •  promethazine (PHENERGAN) tablet 25 mg, 25 mg, Oral, Q6H PRN, Portillo Argueta MD  •  sodium chloride 0.9 % flush 1-10 mL, 1-10 mL, Intravenous, PRN, Portillo Argueta MD, 10 mL at 03/21/17 0949     Diagnostic  Data      I reviewed the patient's new clinical results and imaging.      Assessment/Plan     Active Hospital Problems (** Indicates Principal Problem)    Diagnosis Date Noted   • **Pneumonia of both lungs due to methicillin resistant Staphylococcus aureus (MRSA) [J15.212] 03/16/2017     - previously on Vancomycin (3 days) for MRSA coverage, switch to PO clindamycin  -  DuoNebs  - Sputum culture -show MRSA  - CRP 28.30, trending down today is 1.5.   - Incentive spirometry, supplemental oxygen PRN    - Will discharge home on Clindamycin today     • Prophylactic hospital isolation [Z41.8] 03/16/2017     History of CRE infection continue contact precautions     • Diabetes mellitus type 2 in obese [E11.9, E66.9] 08/24/2016     - Levemir to 95u qhs;  Levemir 95 units qAM, novolog SSI  - Home meal time insulin       • Chronic obstructive pulmonary disease [J44.9] 08/24/2016     - Duo Nebs, Symbicort, oxygen supplementation, steroid     • Tobacco dependence syndrome [F17.200]      1/2ppd  - Nicotine patch ordered     • MIKE on CPAP [G47.33]      CPAP ordered  - 3/18/17 desaturation to 84% on CPAP  - overnight pulse oximetry: multiple desaturations, lowest SpO2 79% on 2LNC on 3/20/17  - case management consult for home O2     • Dyslipidemia [E78.5]      Home atorvastatin     • Morbid obesity due to excess calories [E66.01]      BMI 44.5, dietary consult ordered     • Essential hypertension [I10]      Home lisinopril and metoprolol, will administer IV lasix 80 mg today, 40 mg BID thereafter     • Mild persistent asthma [J45.30]    • GERD (gastroesophageal reflux disease) [K21.9]      Protonix     • Peripheral vascular disease [I73.9]    • Coronary arteriosclerosis [I25.10]      S/p CABG X3 in 2013, continue home Plavix, Metoprolol, and Atorvastatin        Resolved Hospital Problems    Diagnosis Date Noted Date Resolved   • Sepsis due to pneumonia [J18.9, A41.9] 03/17/2017 03/20/2017     -Ceftriaxone 1 g IV daily, plus  vancomycin DuoNebs  -  MRSA PNA  - Legionella ag - negative, pneumococcal ag - negative.  - Lactic acid 3.1 on admission, repeat 2.0; now 1.2  - CRP 28.30 on admission now 1.5  - Incentive spirometry, supplemental oxygen PRN     • Laryngitis, acute [J04.0] 03/17/2017 03/21/2017   • Acute respiratory failure with hypoxia [J96.01] 03/16/2017 03/21/2017     - Oxygen supplementation - wean to SpO2 > 90%   - prednisone 40mg BID   - Ambulate pt today without O2, document sats  - Duo-nebs         I have reviewed the notes, assessments, and/or procedures performed by Dr. Delarosa, I concur with her documentation of Yamileth Slater.      DVT prophylaxis: Heparin      Disposition:Home with home health          This document has been electronically signed by Charlette Bowden MD on March 21, 2017 3:33 PM          This document has been electronically signed by Charlette Bowden MD on March 21, 2017 3:33 PM

## 2017-03-21 NOTE — DISCHARGE PLACEMENT REQUEST
"Yamileth Slater (57 y.o. Female)     Date of Birth Social Security Number Address Home Phone MRN    1960  [address]  [place] [address] [phone] [id number]    [employer] Marital Status          Non-Congregational        Admission Date Admission Type Admitting Provider Attending Provider Department, Room/Bed    3/16/17 Emergency Portillo Raymundo MD Weisenburger, Abigail, MD [address], 360/1    Discharge Date Discharge Disposition Discharge Destination         [place] [facility] [place]             Attending Provider: Yadira Delarosa MD     Allergies:  Other    Isolation:  Contact, Droplet   Infection:  MRSA (03/18/17), CRE (08/12/16)   Code Status:  FULL    Ht:  62.01\" (157.5 cm)   Wt:  279 lb 15.8 oz (127 kg)    Admission Cmt:  None   Principal Problem:  Pneumonia of both lungs due to methicillin resistant Staphylococcus aureus (MRSA) [J15.212] More...                 Active Insurance as of 3/16/2017     Primary Coverage     Payor Plan Insurance Group Employer/Plan Group    MEDICARE MEDICARE A & B      Payor Plan Address Payor Plan Phone Number Effective From Effective To    [address] [phone] 1/1/2016     [place] [address]       Subscriber Name Subscriber Birth Date Member ID       YAMILETH SLATER 1960 [id number]                 Emergency Contacts      (Rel.) Home Phone Work Phone Mobile Phone    Yamileth Chavez (Daughter) [phone]               History & Physical      Portillo Raymundo MD at 3/16/2017  5:51 PM     Attestation signed by Pauline Martinez MD at 3/16/2017  8:13 PM        I have seen the patient.  I have reviewed the notes, assessments, and/or procedures performed by Dr. Raymundo, I concur with her/his documentation and assessment and plan for Yamileth Slater.          This document has been electronically signed by Pauline Martinez MD on March 16, 2017 8:13 PM              "                         FAMILY MEDICINE HISTORY AND PHYSICAL  NAME: Yamileth Slater  : 1960  MRN: [id number]    DATE OF ADMISSION: 17    DATE & TIME SEEN: 17 7:11 PM    PCP: Yadira Delarosa MD    CHIEF COMPLAINT Shortness of breath    HPI:  Yamileth Slater is a 57 y.o. female who presents with shortness of breath, cough, fever of 103.0*F yesterday reportedly. Fatigue started about 4 days ago with dry cough, headache, wheezing, and sore throat. Coughing has increased since symptom onset. Patient got her neighbor's oxygen yesterday to see if she could get some symptom relief and states that it did help. She had it at 3L per minute through nasal cannula. Monday she first experienced fever and took her temperature and found it to be 101.6*F and been ranging between the 102's to 103 degrees fahrenheit since. Patient has had difficulty sleeping with the chronic coughing but reports that she uses a CPAP machine at night. Patient's  today decided that his wife needed to be checked out and brought her to the emergency department for further evaluation. She has received Solumedrol and a dose of Azithromycin and Ceftriaxone. CXR showed signs of bilateral pneumonia and a possible small right pleural effusion. She is currently in bed saturating at 97% with 3 L oxygen through nasal cannula. She states that she feels better since arrival in the ER, and with oxygen and medication administration.    CONCURRENT MEDICAL HISTORY:  Past Medical History   Diagnosis Date   • Anxiety states    • Asthma    • Candidiasis of mouth    • Candidiasis of vulva and vagina    • Carotid artery stenosis      DWIGHT 0-15%, LICA 0-15%. LICA stent 2014.   • Chest pain    • Chronic folliculitis    • Chronic obstructive lung disease    • Coronary arteriosclerosis    • Diabetes mellitus      no retinopathy; A1C 9.1      • Dyslipidemia    • Dysphagia    • Encounter for general adult medical examination with  abnormal findings    • Essential hypertension    • Excoriated eczema      asteosis/keratosis   • Furuncle of neck    • GERD (gastroesophageal reflux disease)    • History of colon polyps    • Hypertensive disorder    • Infection of skin and subcutaneous tissue      rt perineal abscess   • Infestation by Sarcoptes scabiei deepak hominis    • Kidney stone    • Leukocytosis    • Lower limb anomaly      Abscess of lower limb - ANTX causing n/v      • Mixed hyperlipidemia    • Morbid obesity due to excess calories      BMI 44.5   • Need for vaccination    • Neurologic disorder associated with diabetes mellitus    • Non-healing surgical wound      LLE EVHa   • Pain      LLE   • Peripheral vascular disease    • Rash    • Shoulder joint pain    • Sleep apnea    • Surgical follow-up care      5/27/14 L carotid stent   • Tobacco dependence syndrome      1/2ppd      • Type 2 diabetes mellitus    • Viral wart      RIGHT middle phalanx   • Vitamin D deficiency        PAST SURGICAL HISTORY:  Past Surgical History   Procedure Laterality Date   • Coronary artery bypass graft  11/15/2013     CABG x 3 with LIMA to LAD and coronary endarterectomy to LAD, SVG to Ramus intermedius branch and SVG to PDA.    • Cardiac catheterization  08/09/2013     Severe multivessel CAD with critical lesions noted in the RCA, obtuse marginal two, ramus intermemdious, and LAD as described above. Normal LV systolic function with no wall motion abnormality.    • Cardiac catheterization  05/27/2014      LEFT Carotid Stent    • Colonoscopy  05/02/2016     Normal colon.Diverticulosis in the sigmoid colon.No specimens collected.    • Other surgical history  01/25/2016     DESTRUCTION OF BENIGN LESION      • Endoscopy  09/23/2015      Esophageal stricture present.Normal stomach.Normal duodenum.    • Endoscopy  11/16/2015     w/ dilatation - Esophageal stricture present,Dilatation performed.Normal stomach and duodenum.    • Other surgical history  04/18/2014      ear/neck - L attempted CEA, Neck Dissection    • Incision and drainage abscess  01/02/2016     Incision and drainage of right perineal abscess.    • Incision and drainage abscess  02/18/2014     Incision and Drainage of abscess of left lower leg        FAMILY HISTORY:  Family History   Problem Relation Age of Onset   • Coronary artery disease Other    • Diabetes Other    • Heart disease Other    • Hypertension Other         SOCIAL HISTORY:  Social History     Social History   • Marital status:      Spouse name: N/A   • Number of children: N/A   • Years of education: N/A     Occupational History   • Not on file.     Social History Main Topics   • Smoking status: Current Every Day Smoker     Types: Cigarettes   • Smokeless tobacco: Not on file   • Alcohol use No   • Drug use: No   • Sexual activity: Not on file     Other Topics Concern   • Not on file     Social History Narrative       MEDICATIONS:    (Not in a hospital admission)    Current Facility-Administered Medications:   •  Insert peripheral IV, , , Once **AND** sodium chloride 0.9 % flush 10 mL, 10 mL, Intravenous, PRN, Abel Bryan MD    Current Outpatient Prescriptions:   •  atorvastatin (LIPITOR) 20 MG tablet, TAKE 1 TABLET BY MOUTH DAILY., Disp: 30 tablet, Rfl: 3  •  Blood Glucose Monitoring Suppl ([employer] BLOOD GLUCOSE METER) W/DEVICE kit, 1 each 3 (Three) Times a Day., Disp: 1 kit, Rfl: 3  •  budesonide-formoterol (SYMBICORT) 160-4.5 MCG/ACT inhaler, Inhale 2 puffs 2 (Two) Times a Day., Disp: 1 inhaler, Rfl: 6  •  cetirizine (zyrTEC) 10 MG tablet, Take 1 tablet by mouth Daily., Disp: 30 tablet, Rfl: 3  •  chlorhexidine (HIBICLENS) 4 % external liquid, Apply  topically Daily., Disp: 118 mL, Rfl: 0  •  clobetasol (CLOBEX) 0.05 % lotion, apply to skin bid x 1-2w then prn. generic, Disp: 118 g, Rfl: 3  •  clopidogrel (PLAVIX) 75 MG tablet, TAKE 1 TABLET BY MOUTH DAILY., Disp: 30 tablet, Rfl: 3  •  cyclobenzaprine (FLEXERIL) 10 MG tablet, Take 1 tablet  by mouth 2 (Two) Times a Day., Disp: 60 tablet, Rfl: 2  •  furosemide (LASIX) 40 MG tablet, Take 1 tablet by mouth Daily., Disp: 30 tablet, Rfl: 3  •  gabapentin (NEURONTIN) 800 MG tablet, Take 1 tablet by mouth 3 (Three) Times a Day., Disp: 90 tablet, Rfl: 3  •  glucose blood test strip, 1 each by Other route 4 (Four) Times a Day. Use as instructed, Disp: 100 each, Rfl: 12  •  insulin glargine (LANTUS) 100 UNIT/ML injection, Inject 90 Units under the skin Every 12 (Twelve) Hours., Disp: 1 each, Rfl: 11  •  Insulin Lispro (HUMALOG KWIKPEN) 100 UNIT/ML solution pen-injector, Inject 45 Units under the skin 3 (Three) Times a Day Before Meals., Disp: 60 mL, Rfl: 11  •  Insulin Pen Needle (NOVOFINE) 30G X 8 MM misc, As directed 4 times daily, Disp: 100 each, Rfl: 11  •  lisinopril (PRINIVIL,ZESTRIL) 10 MG tablet, TAKE 1 TABLET BY MOUTH DAILY., Disp: 30 tablet, Rfl: 3  •  metFORMIN (GLUMETZA) 1000 MG (MOD) 24 hr tablet, , Disp: , Rfl: 3  •  metFORMIN (GLUMETZA) 1000 MG (MOD) 24 hr tablet, TAKE 1 TABLET BY MOUTH 2 (TWO) TIMES A DAY WITH MEALS., Disp: 60 tablet, Rfl: 3  •  metoprolol tartrate (LOPRESSOR) 50 MG tablet, Take 1 tablet by mouth 2 (Two) Times a Day., Disp: 60 tablet, Rfl: 3  •  naproxen (NAPROSYN) 500 MG tablet, Take 1 tablet by mouth 2 (Two) Times a Day With Meals., Disp: 60 tablet, Rfl: 3  •  nitroglycerin (NITROSTAT) 0.4 MG SL tablet, Place 1 tablet under the tongue As Needed for chest pain. Take no more than 3 doses in 15 minutes., Disp: 30 tablet, Rfl: 3  •  omeprazole-sodium bicarbonate (ZEGERID)  MG per capsule, , Disp: , Rfl: 3  •  pantoprazole (PROTONIX) 40 MG EC tablet, TAKE 1 TABLET BY MOUTH DAILY., Disp: 30 tablet, Rfl: 3  •  permethrin (ELIMITE) 5 % cream, Apply  topically. Massage into skin from head to feet, remove 8-14 hours later with water, Disp: , Rfl:   •  potassium chloride (K-DUR) 10 MEQ CR tablet, TAKE 1 TABLET BY MOUTH EVERY NIGHT., Disp: 30 tablet, Rfl: 3  •  promethazine  (PHENERGAN) 25 MG tablet, Take 25 mg by mouth every 6 (six) hours as needed for nausea or vomiting., Disp: , Rfl:   ALLERGIES:  Clindamycin/lincomycin and Other    REVIEW OF SYSTEMS  Review of Systems  General:negative for - chills, hot flashes, malaise, night sweats, weight gain or weight loss. Positive for fever and fatigue. Positive for intermittent headaches.  Psychological: negative for - anxiety, depression, sleep disturbances or suicidal ideation  Ophthalmic: negative for - blurry vision or loss of vision  ENT: negative for - hearing change, nasal congestion or sore throat  Hematological and Lymphatic: negative for - jaundice  Endocrine: negative for - hair pattern changes, skin changes or temperature intolerance  Respiratory: Positive for cough, wheezing, and shortness of breath aggravated with exertion.  Cardiovascular: no chest pain.  Gastrointestinal: no  Nausea/vomiting, abdominal pain, change in bowel habits, or black or bloody stools  Genito-Urinary: no dysuria, trouble voiding, or hematuria  Musculoskeletal: negative for - joint pain or muscle pain  Neurological: negative for - dizziness,numbness/tingling or seizures    PHYSICAL EXAM:  Temp:  [98.1 °F (36.7 °C)] 98.1 °F (36.7 °C)  Heart Rate:  [75-82] 80  Resp:  [22] 22  BP: (137-170)/() 165/77  Physical Exam  General Appearance:    Alert, cooperative, no distress, appears stated age   Head:    Normocephalic, without obvious abnormality, atraumatic   Eyes:    PERRL, conjunctiva/corneas clear, EOM's intact   Ears:    Normal TM's and external ear canals, both ears   Nose:   Nares normal, septum midline, mucosa normal, no drainage     or sinus tenderness   Throat:   Lips, mucosa, and tongue normal; teeth and gums normal   Neck:   Supple, symmetrical, trachea midline, no adenopathy;     thyroid:  no enlargement/tenderness/nodules   Back:     Symmetric, no curvature, ROM normal, no CVA tenderness   Lungs:     Wheezing in bilateral lower lung fields,  decreased breath sounds in bilateral lower lung fields.   Chest Wall:    No tenderness or deformity    Heart:    Regular rate and rhythm, S1 and S2 normal, no murmur, rub    or gallop   Abdomen:     Soft, non-tender, bowel sounds active all four quadrants,     no masses, no organomegaly   Extremities:   Extremities normal, atraumatic, no cyanosis or edema.Positive for 1+ pitting edema in bilateral lower extremities.    Pulses:   2+ and symmetric all extremities   Skin:   Skin color, texture, turgor normal, no rashes or lesions   Lymph nodes:   Cervical, supraclavicular nodes normal   Neurologic:   CNII-XII grossly intact     DIAGNOSTIC DATA:   Lab Results (last 24 hours)     Procedure Component Value Units Date/Time    CBC & Differential [[id number]] Collected:  03/16/17 1100    Specimen:  Blood Updated:  03/16/17 1121    Narrative:       The following orders were created for panel order CBC & Differential.  Procedure                               Abnormality         Status                     ---------                               -----------         ------                     CBC Auto Differential[[id number]]         Abnormal            Final result                 Please view results for these tests on the individual orders.    CBC Auto Differential [[id number]]  (Abnormal) Collected:  03/16/17 1100    Specimen:  Blood Updated:  03/16/17 1121     WBC 9.56 10*3/mm3      RBC 3.74 (L) 10*6/mm3      Hemoglobin 8.8 (L) g/dL      Hematocrit 28.3 (L) %      MCV 75.7 (L) fL      MCH 23.5 (L) pg      MCHC 31.1 (L) g/dL      RDW 17.4 (H) %      RDW-SD 48.7 (H) fl      MPV 9.1 fL      Platelets 249 10*3/mm3      Neutrophil % 61.0 %      Lymphocyte % 27.7 %      Monocyte % 6.7 %      Eosinophil % 3.5 %      Basophil % 0.3 %      Immature Grans % 0.8 (H) %      Neutrophils, Absolute 5.83 10*3/mm3      Lymphocytes, Absolute 2.65 10*3/mm3      Monocytes, Absolute 0.64 10*3/mm3      Eosinophils, Absolute 0.33 10*3/mm3      Basophils,  Absolute 0.03 10*3/mm3      Immature Grans, Absolute 0.08 (H) 10*3/mm3     Protime-INR [[id number]]  (Normal) Collected:  03/16/17 1100    Specimen:  Blood Updated:  03/16/17 1134     Protime 13.7 Seconds      INR 1.05     Narrative:       Therapeutic range for most indications is 2.0-3.0 INR,  or 2.5-3.5 for mechanical heart valves.    aPTT [[id number]]  (Normal) Collected:  03/16/17 1100    Specimen:  Blood Updated:  03/16/17 1134     PTT 29.7 seconds     Narrative:       The recommended Heparin therapeutic range is 68-97 seconds.    Comprehensive Metabolic Panel [[id number]]  (Abnormal) Collected:  03/16/17 1100    Specimen:  Blood Updated:  03/16/17 1136     Glucose 217 (H) mg/dL      BUN 22 (H) mg/dL      Creatinine 0.85 mg/dL      Sodium 141 mmol/L      Potassium 4.0 mmol/L      Chloride 99 mmol/L      CO2 29.0 mmol/L      Calcium 8.6 mg/dL      Total Protein 7.0 g/dL      Albumin 3.40 g/dL      ALT (SGPT) 24 U/L      AST (SGOT) 30 U/L      Alkaline Phosphatase 190 (H) U/L      Total Bilirubin 0.5 mg/dL      eGFR Non African Amer 69 mL/min/1.73      Globulin 3.6 (H) gm/dL      A/G Ratio 0.9 (L) g/dL      BUN/Creatinine Ratio 25.9 (H)      Anion Gap 13.0 mmol/L     BNP [[id number]]  (Abnormal) Collected:  03/16/17 1100    Specimen:  Blood Updated:  03/16/17 1148     proBNP 928.0 (H) pg/mL     Troponin [[id number]]  (Normal) Collected:  03/16/17 1100    Specimen:  Blood Updated:  03/16/17 1148     Troponin I 0.033 ng/mL     Influenza Antigen [[id number]]  (Normal) Collected:  03/16/17 1204    Specimen:  Swab from Nasopharynx Updated:  03/16/17 1226     Influenza A Ag, EIA Negative      Influenza B Ag, EIA Negative            Imaging Results (last 24 hours)     Procedure Component Value Units Date/Time    XR Chest 2 View [[id number]] Collected:  03/16/17 1130     Updated:  03/16/17 1137    Narrative:         [facility] [place]    Patient Name: DALE ZIMMERHERIBERTO FINK    ORDERING: SMILEY JEFFERS      ATTENDING:    REFERRING: SMILEY JEFFERS    -----------------------    PROCEDURE: Two-view chest    COMPARISON: 12/11/16    HISTORY: Shortness of breath    FINDINGS: Frontal and lateral views of the chest are obtained.     Devices: None    Lungs/Pleura: Lung base opacities, right worse than left,  suspicious for pneumonia or pulmonary edema. Possible small right  pleural effusion.    Cardiomediastinal structures: Heart is enlarged.  Sternal suture  wires appear stable.          Impression:       CONCLUSION:    Lung base opacities, right worse than left, suspicious for  pneumonia or pulmonary edema. Possible small right pleural  effusion.    Electronically signed by:  Umesh Parra MD  3/16/2017 11:36 AM  CDT Workstation: [id number]          I reviewed the patient's new clinical results.  I reviewed the patient's new imaging results and agree with the interpretation.  I reviewed the patient's other test results and agree with the interpretation  I personally viewed and interpreted the patient's EKG/Telemetry data    ASSESSMENT AND PLAN: This is a 57 y.o. female with:    Hospital Problem List     Diabetes mellitus type 2 in obese    Overview Addendum 3/16/2017  7:00 PM by Portillo Argueta MD     Levemir 90 units q 12 hours, novolog SSI             Chronic obstructive pulmonary disease    Overview Signed 3/16/2017  7:04 PM by MD Wayne Nation, Symbicort, oxygen supplementation             Tobacco dependence syndrome    Overview Addendum 3/16/2017  7:04 PM by Portillo Argueta MD     1/2ppd  - Nicotine patch ordered             Sleep apnea    Overview Signed 3/16/2017  7:04 PM by Portillo Argueta MD     CPAP ordered         Peripheral vascular disease    Morbid obesity due to excess calories    Overview Addendum 3/16/2017  7:04 PM by Portillo Argueta MD     BMI 44.5, dietary consult ordered, ADA/heart healthy diet ordered             GERD (gastroesophageal reflux disease)    Overview Signed 3/16/2017  7:05 PM  by Portillo Argueta MD     Protonix         Essential hypertension    Overview Signed 3/16/2017  7:05 PM by Portillo Argueta MD     Home lisinopril and metoprolol, will administer IV lasix 80 mg today, 40 mg BID thereafter             Dyslipidemia    Overview Signed 3/16/2017  7:05 PM by Portillo Argueta MD     Home atorvastatin         Asthma        Acute respiratory failure with hypoxia    Overview Signed 3/16/2017  7:06 PM by Portillo Argueta MD     Oxygen supplementation, ABG, duo-nebs, Solumedrol 60 BID ordered.          Community acquired pneumonia    Overview Signed 3/16/2017  7:08 PM by Portillo Argueta MD     Azithromycin 500 mg IV daily, and Ceftriaxone 1 g IV daily ordered, DuoNebs, sputum culture, legionella ag, mycoplasma PCR, pneumococcal ag ordered. CRP and lactic acid ordered upon admission. Incentive spirometry ordered, oxygen supplementation as needed.         Prophylactic hospital isolation    Overview Signed 3/16/2017  7:08 PM by Portillo Argueta MD     History of CRE infection.         CAD    Continue on home Plavix, Metoprolol, atorvastatin.       DVT prophylaxis: Heparin  Code status is Full Code  [facility] # [id number] , reviewed and scanned into medical record.      I discussed the patients findings and my recommendations with patient and family.     Dr. Martinez is the attending on record at time of admission, she is aware of the patient's status and agrees with the above history and physical.        This document has been electronically signed by Portillo Copeland MD on March 16, 2017 7:11 PM       Electronically signed by Pauline Martinez MD at 3/16/2017  8:13 PM      Pauline Martinez MD at 3/16/2017  7:59 PM          Acute respiratory failure with hypoxia  Subjective:     Yamileth Slater is a 57 y.o. female who presents for  shortness of breath, cough nonproductive, fever 103°F that started yesterday.  She started with fatigue about 4 days ago with a dry hacking cough headache wheezing and sore throat.   She went to her neighbor's house yesterday to see if she could borrow her oxygen.  She was dyspneic on exertion.  She is using CPAP machine at night.  She does continue to smoke about a half a pack a day of cigarettes.  The following portions of the patient's history were reviewed and updated as appropriate: allergies, current medications, past family history, past medical history, past social history, past surgical history and problem list.    Concurrent Medical Hx:  Past Medical History   Diagnosis Date   • Anxiety states    • Asthma    • Candidiasis of mouth    • Candidiasis of vulva and vagina    • Carotid artery stenosis      DWIGHT 0-15%, LICA 0-15%. LICA stent 5/2014.   • Chest pain    • Chronic folliculitis    • Chronic obstructive lung disease    • Coronary arteriosclerosis    • Diabetes mellitus      no retinopathy; A1C 9.1      • Dyslipidemia    • Dysphagia    • Encounter for general adult medical examination with abnormal findings    • Essential hypertension    • Excoriated eczema      asteosis/keratosis   • Furuncle of neck    • GERD (gastroesophageal reflux disease)    • History of colon polyps    • Hypertensive disorder    • Infection of skin and subcutaneous tissue      rt perineal abscess   • Infestation by Sarcoptes scabiei deepak hominis    • Kidney stone    • Leukocytosis    • Lower limb anomaly      Abscess of lower limb - ANTX causing n/v      • Mixed hyperlipidemia    • Morbid obesity due to excess calories      BMI 44.5   • Need for vaccination    • Neurologic disorder associated with diabetes mellitus    • Non-healing surgical wound      LLE EVHa   • Pain      LLE   • Peripheral vascular disease    • Rash    • Shoulder joint pain    • Sleep apnea    • Surgical follow-up care      5/27/14 L carotid stent   • Tobacco dependence syndrome      1/2ppd      • Type 2 diabetes mellitus    • Viral wart      RIGHT middle phalanx   • Vitamin D deficiency        Past Surgical Hx:  Past Surgical History    Procedure Laterality Date   • Coronary artery bypass graft  11/15/2013     CABG x 3 with LIMA to LAD and coronary endarterectomy to LAD, SVG to Ramus intermedius branch and SVG to PDA.    • Cardiac catheterization  08/09/2013     Severe multivessel CAD with critical lesions noted in the RCA, obtuse marginal two, ramus intermemdious, and LAD as described above. Normal LV systolic function with no wall motion abnormality.    • Cardiac catheterization  05/27/2014      LEFT Carotid Stent    • Colonoscopy  05/02/2016     Normal colon.Diverticulosis in the sigmoid colon.No specimens collected.    • Other surgical history  01/25/2016     DESTRUCTION OF BENIGN LESION      • Endoscopy  09/23/2015      Esophageal stricture present.Normal stomach.Normal duodenum.    • Endoscopy  11/16/2015     w/ dilatation - Esophageal stricture present,Dilatation performed.Normal stomach and duodenum.    • Other surgical history  04/18/2014     ear/neck - L attempted CEA, Neck Dissection    • Incision and drainage abscess  01/02/2016     Incision and drainage of right perineal abscess.    • Incision and drainage abscess  02/18/2014     Incision and Drainage of abscess of left lower leg      Family Hx:  Family History   Problem Relation Age of Onset   • Coronary artery disease Other    • Diabetes Other    • Heart disease Other    • Hypertension Other       Social History:  Social History     Social History   • Marital status:      Spouse name: N/A   • Number of children: N/A   • Years of education: N/A     Occupational History   • Not on file.     Social History Main Topics   • Smoking status: Current Every Day Smoker     Types: Cigarettes   • Smokeless tobacco: Not on file   • Alcohol use No   • Drug use: No   • Sexual activity: Not on file     Other Topics Concern   • Not on file     Social History Narrative       Home Medications:  No current facility-administered medications on file prior to encounter.      Current Outpatient  Prescriptions on File Prior to Encounter   Medication Sig Dispense Refill   • atorvastatin (LIPITOR) 20 MG tablet TAKE 1 TABLET BY MOUTH DAILY. 30 tablet 3   • Blood Glucose Monitoring Suppl ([employer] BLOOD GLUCOSE METER) W/DEVICE kit 1 each 3 (Three) Times a Day. 1 kit 3   • budesonide-formoterol (SYMBICORT) 160-4.5 MCG/ACT inhaler Inhale 2 puffs 2 (Two) Times a Day. 1 inhaler 6   • cetirizine (zyrTEC) 10 MG tablet Take 1 tablet by mouth Daily. 30 tablet 3   • chlorhexidine (HIBICLENS) 4 % external liquid Apply  topically Daily. 118 mL 0   • clobetasol (CLOBEX) 0.05 % lotion apply to skin bid x 1-2w then prn. generic 118 g 3   • clopidogrel (PLAVIX) 75 MG tablet TAKE 1 TABLET BY MOUTH DAILY. 30 tablet 3   • cyclobenzaprine (FLEXERIL) 10 MG tablet Take 1 tablet by mouth 2 (Two) Times a Day. 60 tablet 2   • furosemide (LASIX) 40 MG tablet Take 1 tablet by mouth Daily. 30 tablet 3   • gabapentin (NEURONTIN) 800 MG tablet Take 1 tablet by mouth 3 (Three) Times a Day. 90 tablet 3   • glucose blood test strip 1 each by Other route 4 (Four) Times a Day. Use as instructed 100 each 12   • insulin glargine (LANTUS) 100 UNIT/ML injection Inject 90 Units under the skin Every 12 (Twelve) Hours. 1 each 11   • Insulin Lispro (HUMALOG KWIKPEN) 100 UNIT/ML solution pen-injector Inject 45 Units under the skin 3 (Three) Times a Day Before Meals. 60 mL 11   • Insulin Pen Needle (NOVOFINE) 30G X 8 MM misc As directed 4 times daily 100 each 11   • lisinopril (PRINIVIL,ZESTRIL) 10 MG tablet TAKE 1 TABLET BY MOUTH DAILY. 30 tablet 3   • metFORMIN (GLUMETZA) 1000 MG (MOD) 24 hr tablet   3   • metFORMIN (GLUMETZA) 1000 MG (MOD) 24 hr tablet TAKE 1 TABLET BY MOUTH 2 (TWO) TIMES A DAY WITH MEALS. 60 tablet 3   • metoprolol tartrate (LOPRESSOR) 50 MG tablet Take 1 tablet by mouth 2 (Two) Times a Day. 60 tablet 3   • naproxen (NAPROSYN) 500 MG tablet Take 1 tablet by mouth 2 (Two) Times a Day With Meals. 60 tablet 3   • nitroglycerin (NITROSTAT) 0.4  "MG SL tablet Place 1 tablet under the tongue As Needed for chest pain. Take no more than 3 doses in 15 minutes. 30 tablet 3   • omeprazole-sodium bicarbonate (ZEGERID)  MG per capsule   3   • pantoprazole (PROTONIX) 40 MG EC tablet TAKE 1 TABLET BY MOUTH DAILY. 30 tablet 3   • permethrin (ELIMITE) 5 % cream Apply  topically. Massage into skin from head to feet, remove 8-14 hours later with water     • potassium chloride (K-DUR) 10 MEQ CR tablet TAKE 1 TABLET BY MOUTH EVERY NIGHT. 30 tablet 3   • promethazine (PHENERGAN) 25 MG tablet Take 25 mg by mouth every 6 (six) hours as needed for nausea or vomiting.         Allergies:  Clindamycin/lincomycin and Other    Review of Systems  Review of Systems   Constitutional: Positive for activity change and fever. Negative for appetite change and fatigue.   HENT: Positive for sore throat. Negative for ear pain, sinus pressure and sneezing.    Eyes: Negative for pain, discharge, itching and visual disturbance.   Respiratory: Positive for cough and shortness of breath.    Cardiovascular: Negative for chest pain and palpitations.   Gastrointestinal: Negative for abdominal pain, constipation, diarrhea and nausea.   Endocrine: Negative for cold intolerance, heat intolerance, polydipsia, polyphagia and polyuria.   Genitourinary: Negative for difficulty urinating, dysuria, frequency and urgency.   Musculoskeletal: Negative for arthralgias and gait problem.   Skin: Positive for rash. Negative for color change.   Neurological: Negative for dizziness, weakness and headaches.   Hematological: Negative for adenopathy. Does not bruise/bleed easily.   Psychiatric/Behavioral: Positive for sleep disturbance. Negative for agitation and confusion.       Objective:     Visit Vitals   • /72 (BP Location: Right arm, Patient Position: Sitting)   • Pulse 96   • Temp 98.2 °F (36.8 °C) (Axillary)   • Resp 20   • Ht 62\" (157.5 cm)   • Wt 280 lb (127 kg)   • SpO2 94%   • BMI 51.21 kg/m2 "     Physical Exam   Constitutional: She is oriented to person, place, and time. She appears well-developed and well-nourished.   HENT:   Head: Normocephalic and atraumatic.   Right Ear: Hearing and external ear normal.   Left Ear: Hearing and external ear normal.   Nose: Nose normal.   Mouth/Throat: Oropharynx is clear and moist.   Eyes: Conjunctivae and EOM are normal. Pupils are equal, round, and reactive to light.   Neck: Normal range of motion. Neck supple.   Cardiovascular: Normal rate, regular rhythm, normal heart sounds and intact distal pulses.    Pulmonary/Chest: Effort normal. She has rales (bibasilar).   Decreased breath sounds bilaterally   Abdominal: Soft. Bowel sounds are normal. She exhibits no distension. There is no tenderness. There is no rebound and no guarding.   Musculoskeletal: Normal range of motion. She exhibits no edema.   Neurological: She is alert and oriented to person, place, and time.   Skin: Skin is warm and dry.   Multiple macular lesions on face no purulent drainage.    Psychiatric: She has a normal mood and affect. Her speech is normal and behavior is normal. Judgment and thought content normal. Cognition and memory are normal.     I reviewed the patient's new clinical results.  I reviewed the patient's new imaging results.  Assessment/Plan:     Active Hospital Problems (** Indicates Principal Problem)    Diagnosis Date Noted   • **Acute respiratory failure with hypoxia [J96.01] 03/16/2017     Oxygen supplementation, ABG, duo-nebs, Solumedrol 60 BID ordered.      • Community acquired pneumonia [J18.9] 03/16/2017     Azithromycin 500 mg IV daily, and Ceftriaxone 1 g IV daily ordered, DuoNebs, sputum culture, legionella ag, mycoplasma PCR, pneumococcal ag ordered. CRP and lactic acid ordered upon admission. Incentive spirometry ordered, oxygen supplementation as needed.     • Prophylactic hospital isolation [Z41.8] 03/16/2017     History of CRE infection.     • Pneumonia of both lungs  due to infectious organism [J18.9] 03/16/2017   • Diabetes mellitus type 2 in obese [E11.9, E66.9] 08/24/2016     Levemir 90 units q 12 hours, novolog SSI     • Chronic obstructive pulmonary disease [J44.9] 08/24/2016     Duo Nebs, Symbicort, oxygen supplementation     • Tobacco dependence syndrome [F17.200]      1/2ppd  - Nicotine patch ordered     • Sleep apnea [G47.30]      CPAP ordered     • Dyslipidemia [E78.5]      Home atorvastatin     • Morbid obesity due to excess calories [E66.01]      BMI 44.5, dietary consult ordered     • Essential hypertension [I10]      Home lisinopril and metoprolol, will administer IV lasix 80 mg today, 40 mg BID thereafter     • Asthma [J45.909]    • GERD (gastroesophageal reflux disease) [K21.9]      Protonix     • Peripheral vascular disease [I73.9]    • Coronary arteriosclerosis [I25.10]      S/p CABG X3 in 2013, continue home Plavix, Metoprolol, and Atorvastatin        Resolved Hospital Problems    Diagnosis Date Noted Date Resolved   No resolved problems to display.         I have seen and examined the patient.  I have reviewed the notes, assessments, and/or procedures performed by Dr. Raymundo, I concur with her/his documentation and assessment and plan for Yamileth Smithores Bryon.        This document has been electronically signed by Pauline Martinez MD on March 16, 2017 8:12 PM          Electronically signed by Pauline Martinez MD at 3/16/2017  8:12 PM        Vital Signs (last 24 hours)       03/20 0700  -  03/21 0659 03/21 0700  -  03/21 1225   Most Recent    Temp (°F) 96.7 -  98.5       98 (36.7)    Heart Rate 62 -  93      82     82    Resp 18 -  20      18     18    /56 -  169/74       140/65    SpO2 (%) 92 -  98    91 -  96     96             Physician Progress Notes (last 24 hours) (Notes from 3/20/2017 12:25 PM through 3/21/2017 12:25 PM)      Yadira Delarosa MD at 3/21/2017  7:47 AM  Version 1 of 1         FAMILY MEDICINE DAILY PROGRESS NOTE  NAME: Yamileth  "Nga Slater  : 1960  MRN: [id number]     LOS: 5 days     PROVIDER OF SERVICE: Yadira Delarosa MD    Chief Complaint: Pneumonia of both lungs due to methicillin resistant Staphylococcus aureus (MRSA)    Subjective:     Interval History:  History taken from: patient  Patient admitted with acute hypoxic respiratory failure and sepsis d.t. MRSA pna, states her breathing is much improved, she still has cough.  Overnight pulse oximetry revealed desaturations to 79% on 2LNC. Spoke with pt regarding clindamycin \"allergy\", reported reaction is vaginal yeast infection, patient has tolerated this medication multiple times in the past, counseled that yeast infection is not an allergy, pt ok with discharge home on clindamycin.     Review of Systems:   Review of Systems   Constitutional: Negative for activity change, appetite change, chills and fever.   HENT: Negative for congestion and trouble swallowing.    Eyes: Negative for photophobia and visual disturbance.   Respiratory: Positive for cough, and wheezing.    Cardiovascular: Positive for leg swelling. Negative for chest pain and palpitations.   Gastrointestinal: Negative for abdominal pain, constipation, diarrhea, nausea and vomiting.   Endocrine: Negative for polydipsia and polyphagia.   Genitourinary: Negative for difficulty urinating, dysuria and frequency.   Musculoskeletal: Negative for arthralgias and gait problem.   Skin: Negative for color change, pallor and rash.   Neurological: Negative for weakness, light-headedness and headaches.   Psychiatric/Behavioral: Negative for sleep disturbance. The patient is not nervous/anxious.        Objective:     Vital Signs  Temp:  [96.7 °F (35.9 °C)-98.5 °F (36.9 °C)] 98 °F (36.7 °C)  Heart Rate:  [62-93] 87  Resp:  [18-20] 20  BP: (119-169)/(56-80) 140/65  Body mass index is 51.2 kg/(m^2).     Physical Exam  Physical Exam   Constitutional: She is oriented to person, place, and time. She appears well-developed and " well-nourished. She is active and cooperative. No distress.   Obese   HEENT: NCAT, EOMI, PERRL   Cardiovascular: Normal rate, regular rhythm, normal heart sounds and intact distal pulses.    Pulmonary/Chest: Effort normal. No respiratory distress. She has decreased breath sounds. She has wheezes (throughout).    Abdominal: Soft. Bowel sounds are normal. She exhibits no distension. There is no tenderness.   Genitourinary: There is no rash, tenderness or lesion on the right labia. There is no rash, tenderness or lesion on the left labia.   Genitourinary Comments: No abscess in groin   Musculoskeletal: Normal range of motion. She exhibits edema (1+ pitting bilateral lower ext).   Neurological: She is alert and oriented to person, place, and time.   Skin: Skin is warm and dry.   Multiple excoriations on face, arms, abdomen, legs without evidence of infection.   Vitals reviewed.      Medication Review    Current Facility-Administered Medications:   •  acetaminophen (TYLENOL) tablet 650 mg, 650 mg, Oral, Q4H PRN, Portillo Argueta MD  •  atorvastatin (LIPITOR) tablet 20 mg, 20 mg, Oral, Daily, Portillo Argueta MD, 20 mg at 03/20/17 0922  •  bisacodyl (DULCOLAX) EC tablet 5 mg, 5 mg, Oral, Daily PRN, Portillo Argueta MD  •  bisacodyl (DULCOLAX) suppository 10 mg, 10 mg, Rectal, Daily PRN, Portillo Argueta MD  •  budesonide-formoterol (SYMBICORT) 160-4.5 MCG/ACT inhaler 2 puff, 2 puff, Inhalation, BID - RT, Portillo Argueta MD, 2 puff at 03/20/17 1909  •  cetirizine (zyrTEC) tablet 10 mg, 10 mg, Oral, Daily, Portillo Argueta MD, 10 mg at 03/20/17 0922  •  clopidogrel (PLAVIX) tablet 75 mg, 75 mg, Oral, Daily, Portillo Argueta MD, 75 mg at 03/20/17 0921  •  cyclobenzaprine (FLEXERIL) tablet 10 mg, 10 mg, Oral, BID, Portillo Argueta MD, 10 mg at 03/20/17 1735  •  dextrose (D50W) solution 25 g, 25 g, Intravenous, Q15 Min PRN, Portillo Argueta MD  •  dextrose (GLUTOSE) oral gel 15 g, 15 g, Oral, Q15 Min PRN, Portillo Argueta MD  •  enoxaparin  (LOVENOX) syringe 40 mg, 40 mg, Subcutaneous, Q24H, Yadira Delarosa MD, 40 mg at 03/20/17 0921  •  furosemide (LASIX) tablet 40 mg, 40 mg, Oral, Daily, Alban Vázquez MD, 40 mg at 03/20/17 0922  •  gabapentin (NEURONTIN) capsule 800 mg, 800 mg, Oral, Q8H, Portillo Argueta MD, 800 mg at 03/21/17 0545  •  glucagon (human recombinant) (GLUCAGEN DIAGNOSTIC) injection 1 mg, 1 mg, Subcutaneous, Q15 Min PRN, Portillo Argueta MD  •  insulin aspart (novoLOG) injection 0-24 Units, 0-24 Units, Subcutaneous, 4x Daily AC & at Bedtime, Portillo Argueta MD, 4 Units at 03/20/17 1753  •  insulin aspart (novoLOG) injection 30 Units, 30 Units, Subcutaneous, TID With Meals, Alban Vázquez MD, 30 Units at 03/20/17 1753  •  insulin detemir (LEVEMIR) injection 95 Units, 95 Units, Subcutaneous, Nightly, Yadira Delarosa MD, 95 Units at 03/20/17 2155  •  insulin detemir (LEVEMIR) injection 95 Units, 95 Units, Subcutaneous, QAM, Yadira Delarosa MD, 95 Units at 03/21/17 0733  •  ipratropium-albuterol (DUO-NEB) nebulizer solution 3 mL, 3 mL, Nebulization, 4x Daily - RT, Portillo Argueta MD, 3 mL at 03/20/17 1902  •  lisinopril (PRINIVIL,ZESTRIL) tablet 10 mg, 10 mg, Oral, Q24H, Portillo Argueta MD, 10 mg at 03/20/17 0922  •  metoprolol tartrate (LOPRESSOR) tablet 50 mg, 50 mg, Oral, BID, Portillo Argueta MD, 50 mg at 03/20/17 1735  •  nicotine (NICODERM CQ) 14 MG/24HR patch 1 patch, 1 patch, Transdermal, Q24H, Portillo Argueta MD, 1 patch at 03/20/17 2155  •  nitroglycerin (NITROSTAT) SL tablet 0.4 mg, 0.4 mg, Sublingual, PRN, Portillo Argueta MD  •  ondansetron (ZOFRAN) injection 4 mg, 4 mg, Intravenous, Q6H PRN, Portillo Argueta MD  •  pantoprazole (PROTONIX) injection 40 mg, 40 mg, Intravenous, Q AM, Portillo Argueta MD, 40 mg at 03/21/17 0511  •  Pharmacy to dose vancomycin, , Does not apply, Continuous PRN, Alban Vázquez MD  •  potassium chloride (K-DUR) CR tablet 10 mEq, 10 mEq, Oral, Daily, Portillo Argueta MD, 10 mEq at 03/20/17 0954  •   predniSONE (DELTASONE) tablet 40 mg, 40 mg, Oral, Q12H, Alban Vázquez MD, 40 mg at 03/20/17 2155  •  promethazine (PHENERGAN) tablet 25 mg, 25 mg, Oral, Q6H PRN, Portillo Argueta MD  •  sodium chloride 0.9 % flush 1-10 mL, 1-10 mL, Intravenous, PRN, Portillo Argueta MD, 10 mL at 03/16/17 2216  •  vancomycin (VANCOCIN) 2,000 mg in sodium chloride 0.9 % 500 mL IVPB, 15 mg/kg, Intravenous, Q18H, Alban Vázquez MD, Last Rate: 0 mL/hr at 03/20/17 0130, 2,000 mg at 03/20/17 1736     Diagnostic Data    Lab Results (last 24 hours)     Procedure Component Value Units Date/Time    POC Glucose Fingerstick [[id number]]  (Abnormal) Collected:  03/19/17 1654    Specimen:  Blood Updated:  03/20/17 0754     Glucose 299 (H) mg/dL       Sliding Scale AdminRN NotifiedMeter: [id number]: [id number] FIELDS        POC Glucose Fingerstick [[id number]]  (Abnormal) Collected:  03/20/17 0646    Specimen:  Blood Updated:  03/20/17 0755     Glucose 260 (H) mg/dL       RN NotifiedMeter: [id number]: [id number] PAGE TAMMIE       Vancomycin, Trough [[id number]]  (Normal) Collected:  03/20/17 1505    Specimen:  Blood Updated:  03/20/17 1538     Vancomycin Trough 13.25 mcg/mL     Blood Culture [[id number]]  (Normal) Collected:  03/16/17 2000    Specimen:  Blood from Hand, Right Updated:  03/20/17 2101     Blood Culture No growth at 4 days     Blood Culture [[id number]]  (Normal) Collected:  03/16/17 1950    Specimen:  Blood from Arm, Right Updated:  03/20/17 2101     Blood Culture No growth at 4 days     POC Glucose Fingerstick [[id number]]  (Abnormal) Collected:  03/20/17 1111    Specimen:  Blood Updated:  03/20/17 2154     Glucose 301 (H) mg/dL       Meter: [id number]: [id number] YANICK GALLOWAY       POC Glucose Fingerstick [[id number]]  (Abnormal) Collected:  03/20/17 2154    Specimen:  Blood Updated:  03/20/17 2237     Glucose 193 (H) mg/dL       Sliding Scale AdminMeter: [id number]: [id number] DARIEN IZAGUIRRE       [facility]  Glucose Fingerstick [[id number]]  (Abnormal) Collected:  03/21/17 0544    Specimen:  Blood Updated:  03/21/17 0604     Glucose 188 (H) mg/dL       Sliding Scale AdminMeter: [id number]: [id number] DARIEN IZAGUIRRE       CBC & Differential [[id number]] Collected:  03/21/17 0545    Specimen:  Blood Updated:  03/21/17 0629    Narrative:       The following orders were created for panel order CBC & Differential.  Procedure                               Abnormality         Status                     ---------                               -----------         ------                     CBC Auto Differential[[id number]]         Abnormal            Final result                 Please view results for these tests on the individual orders.    CBC Auto Differential [[id number]]  (Abnormal) Collected:  03/21/17 0545    Specimen:  Blood Updated:  03/21/17 0629     WBC 10.30 (H) 10*3/mm3      RBC 4.28 10*6/mm3      Hemoglobin 10.0 (L) g/dL      Hematocrit 32.4 (L) %      MCV 75.7 (L) fL      MCH 23.4 (L) pg      MCHC 30.9 (L) g/dL      RDW 18.0 (H) %      RDW-SD 48.8 (H) fl      MPV 9.1 fL      Platelets 371 10*3/mm3      Neutrophil % 73.3 %      Lymphocyte % 19.4 %      Monocyte % 3.6 %      Eosinophil % 0.1 %      Basophil % 0.2 %      Immature Grans % 3.4 (H) %      Neutrophils, Absolute 7.55 10*3/mm3      Lymphocytes, Absolute 2.00 10*3/mm3      Monocytes, Absolute 0.37 10*3/mm3      Eosinophils, Absolute 0.01 10*3/mm3      Basophils, Absolute 0.02 10*3/mm3      Immature Grans, Absolute 0.35 (H) 10*3/mm3     Comprehensive Metabolic Panel [[id number]]  (Abnormal) Collected:  03/21/17 0545    Specimen:  Blood Updated:  03/21/17 0653     Glucose 182 (H) mg/dL      BUN 26 (H) mg/dL      Creatinine 0.87 mg/dL      Sodium 141 mmol/L      Potassium 5.2 (H) mmol/L      Chloride 101 mmol/L      CO2 29.0 mmol/L      Calcium 9.1 mg/dL      Total Protein 7.1 g/dL      Albumin 3.60 g/dL      ALT (SGPT) 29 U/L      AST (SGOT) 60 (H) U/L       Alkaline Phosphatase 114 U/L      Total Bilirubin 0.3 mg/dL      eGFR Non African Amer 67 mL/min/1.73      Globulin 3.5 gm/dL      A/G Ratio 1.0 (L) g/dL      BUN/Creatinine Ratio 29.9 (H)      Anion Gap 11.0 mmol/L         I reviewed the patient's new clinical results.    Assessment/Plan:     Active Hospital Problems (** Indicates Principal Problem)    Diagnosis Date Noted   • **Pneumonia of both lungs due to methicillin resistant Staphylococcus aureus (MRSA) [J15.212] 03/16/2017     Priority: Medium     - previously on Vancomycin (3 days) for MRSA coverage, switch to PO clindamycin  -  DuoNebs  - Sputum culture -show MRSA  - CRP 28.30, trending down today is 1.5.   - Incentive spirometry, supplemental oxygen PRN    - Will discharge home on Clindamycin today     • Acute respiratory failure with hypoxia [J96.01] 03/16/2017     Priority: High     - Oxygen supplementation - wean to SpO2 > 90%   - prednisone 40mg BID   - Ambulate pt today without O2, document sats  - Duo-nebs       • Laryngitis, acute [J04.0] 03/17/2017   • Prophylactic hospital isolation [Z41.8] 03/16/2017     History of CRE infection continue contact precautions     • Diabetes mellitus type 2 in obese [E11.9, E66.9] 08/24/2016     - Levemir to 95u qhs;  Levemir 95 units qAM, novolog SSI  - Home meal time insulin       • Chronic obstructive pulmonary disease [J44.9] 08/24/2016     - Duo Nebs, Symbicort, oxygen supplementation, steroid     • Tobacco dependence syndrome [F17.200]      1/2ppd  - Nicotine patch ordered     • Sleep apnea [G47.30]      CPAP ordered  - 3/18/17 desaturation to 84% on CPAP  - overnight pulse oximetry: multiple desaturations, lowest SpO2 79% on 2LNC on 3/20/17  - case management consult for home O2     • Dyslipidemia [E78.5]      Home atorvastatin     • Morbid obesity due to excess calories [E66.01]      BMI 44.5, dietary consult ordered     • Essential hypertension [I10]      Home lisinopril and metoprolol, will administer IV  "lasix 80 mg today, 40 mg BID thereafter     • Asthma [J45.909]    • GERD (gastroesophageal reflux disease) [K21.9]      Protonix     • Peripheral vascular disease [I73.9]    • Coronary arteriosclerosis [I25.10]      S/p CABG X3 in , continue home Plavix, Metoprolol, and Atorvastatin        Resolved Hospital Problems    Diagnosis Date Noted Date Resolved   • Sepsis due to pneumonia [J18.9, A41.9] 2017     Priority: High     -Ceftriaxone 1 g IV daily, plus vancomycin DuoNebs  -  MRSA PNA  - Legionella ag - negative, pneumococcal ag - negative.  - Lactic acid 3.1 on admission, repeat 2.0; now 1.2  - CRP 28.30 on admission now 1.5  - Incentive spirometry, supplemental oxygen PRN       DVT prophylaxis: Lovenox  Code status is Full Code    Plan for disposition:Home with home health sepsis protocol & respiratory assessment today      Signature  Yadira Delarosa MD PGY2  [place] [address]  Office: [phone]    This document has been electronically signed by Yadira Delarosa MD on 2017 7:47 AM         Electronically signed by Yadira Delarosa MD at 3/21/2017  7:55 AM          [address] [address]  Phone: [phone]  Fax:  Date Ordered: Mar 21, 2017         Patient: Yamileth Slater MRN: [id number]   [address]  [place] [address] : 1960  SSN:    Phone: [phone] Sex: F     Weight: 279 lb 15.8 oz (127 kg)         Ht Readings from Last 1 Encounters:   17 62.01\" (157.5 cm)         Home Oxygen Therapy (Order ID: [id number])   Diagnosis: MIKE on CPAP (G47.33 [ICD-10-CM] 327.23 [ICD-9-CM])  Acute respiratory failure with hypoxia (J96.01 [ICD-10-CM] 518.81 [ICD-9-CM])   Quantity: 1  Comments: J96.01, G47.33     Delivery Modality: Bleed into PAP  Liters Per Minute: 2  Duration: During Sleep  Equipment: " " Oxygen Concentrator &  &  Portable Gaseous Oxygen System & Portable Oxygen Contents Gaseous &  Conserving Regulator  The face to face evaluation was performed on: 3/21/2017  Length of Need (99 Months = Lifetime): 99 Months = Lifetime            Authorizing Provider:Yadira Delarosa MD  Order Entered By: Yadira Delarosa MD 3/21/2017 8:36 AM     Electronically signed by: Yadira Delarosa MD (NPI: [id number]) 3/21/2017 8:36 AM            [address] [address]  Phone: [phone]  Fax:  Date Ordered: Mar 21, 2017         Patient: Yamileth Slater MRN: [id number]   [address]  [place] [address] : 1960  SSN:    Phone: [phone] Sex: F     Weight: 279 lb 15.8 oz (127 kg)         Ht Readings from Last 1 Encounters:   17 62.01\" (157.5 cm)         Home Nebulizer (Order ID: [id number])   Diagnosis: Chronic obstructive pulmonary disease with acute lower respiratory infection (J44.0 [ICD-10-CM] 496,519.8 [ICD-9-CM])  Mild persistent asthma without complication (J45.30 [ICD-10-CM] 493.90 [ICD-9-CM])   Quantity: 1  Comments: J45.909, J44.0     Nebulizer Equipment:  Nebulizer w/ Compressor  Nebulizer Accessories:  Nebulizer Kit - Administration Set, Non-Disposable  The face to face evaluation was performed on: 3/21/2017  Length of Need (99 Months = Lifetime): 99 Months = Lifetime            Authorizing Provider:Yadira Delarosa MD  Order Entered By: Yadira Delarosa MD 3/21/2017 8:36 AM     Electronically signed by: Yadira Delarosa MD (NPI: [id number]) 3/21/2017 8:36 AM            "

## 2017-03-21 NOTE — PROGRESS NOTES
Acute Care - Physical Therapy Treatment Note  [facility]     Patient Name: Yamileth Slater  : 1960  MRN: [id number]  Today's Date: 3/21/2017  Onset of Illness/Injury or Date of Surgery Date: 17  Date of Referral to PT: 17  Referring Physician: Dr. Vázquez    Admit Date: 3/16/2017    Visit Dx:    ICD-10-CM ICD-9-CM   1. Pneumonia of both lungs due to infectious organism, unspecified part of lung J18.9 483.8   2. Acute respiratory failure with hypoxia J96.01 518.81   3. Impaired physical mobility Z74.09 781.99   4. Chronic obstructive pulmonary disease with acute lower respiratory infection J44.0 496     519.8   5. MIKE on CPAP G47.33 327.23   6. Mild persistent asthma without complication J45.30 493.90   7. Pneumonia of both lower lobes due to methicillin resistant Staphylococcus aureus (MRSA) J15.212 482.42   8. Sepsis due to pneumonia J18.9 486    A41.9 995.91     Patient Active Problem List   Diagnosis   • Diabetes mellitus type 2 in obese   • Chronic obstructive pulmonary disease   • Low back pain   • Vitamin D deficiency   • Type 2 diabetes mellitus   • Viral wart   • Tobacco dependence syndrome   • Surgical follow-up care   • MIKE on CPAP   • Shoulder joint pain   • Rash   • Peripheral vascular disease   • Pain   • Non-healing surgical wound   • Neurologic disorder associated with diabetes mellitus   • Need for vaccination   • Morbid obesity due to excess calories   • Mixed hyperlipidemia   • Lower limb anomaly   • Leukocytosis   • Kidney stone   • Infestation by Sarcoptes scabiei deepak hominis   • Infection of skin and subcutaneous tissue   • Hypertensive disorder   • History of colon polyps   • GERD (gastroesophageal reflux disease)   • Furuncle of neck   • Excoriated eczema   • Essential hypertension   • Encounter for general adult medical examination with abnormal findings   • Dysphagia   • Dyslipidemia   • Diabetes mellitus   • Coronary arteriosclerosis   • Chronic obstructive lung  disease   • Chronic folliculitis   • Chest pain   • Carotid artery stenosis   • Candidiasis of vulva and vagina   • Candidiasis of mouth   • Asthma   • Anxiety states   • 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   • Abdominal wall abscess   • Uncontrolled type 2 diabetes mellitus with complication, with long-term current use of insulin   • Pneumonia of both lungs due to methicillin resistant Staphylococcus aureus (MRSA)   • Prophylactic hospital isolation   • Pneumonia of both lungs due to infectious organism               Adult Rehabilitation Note       03/21/17 0845 03/20/17 1543       Rehab Assessment/Intervention    Discipline physical therapist  -CZ physical therapist  -CZ     Document Type therapy note (daily note)  -CZ therapy note (daily note)  -CZ     Subjective Information agree to therapy;no complaints  -CZ agree to therapy;no complaints  -CZ     Patient Effort, Rehab Treatment excellent  -CZ      Symptoms Noted During/After Treatment none  -CZ      Recorded by [CZ] Christopher Arnett, PT [CZ] Christopher Arnett, PT     Vital Signs    Pre Systolic BP Rehab 129  -  -CZ     Pre Treatment Diastolic BP 70  -CZ 74  -CZ     Intra Treatment Diastolic BP  158  -CZ     Post Systolic BP Rehab 147  -CZ 81  -CZ     Post Treatment Diastolic BP 74  -CZ 68  -CZ     Pretreatment Heart Rate (beats/min) 77  -CZ 93   during standing ther ex.  -CZ     Intratreatment Heart Rate (beats/min) 99  -CZ 81  -CZ     Posttreatment Heart Rate (beats/min) 78  -CZ 72  -CZ     Pre SpO2 (%) 95  -CZ 95  -CZ     O2 Delivery Pre Treatment room air  -CZ room air  -CZ     Intra SpO2 (%) 95  -CZ 93   during standing ther ex.  -CZ     O2 Delivery Intra Treatment room air  -CZ supplemental O2  -CZ     Post SpO2 (%) 96  -CZ 96  -CZ     O2 Delivery Post Treatment room air  -CZ room air  -CZ     Pre Patient Position Supine  -CZ      Post Patient Position Sitting  -CZ      Recorded by [CZ] Christopher Arnett, PT [CZ] Christopher Arnett, PT      Pain Assessment    Pain Assessment No/denies pain  -CZ      Recorded by [CZ] Christopher Arnett PT      Cognitive Assessment/Intervention    Current Cognitive/Communication Assessment functional  -CZ functional  -CZ     Follows Commands/Answers Questions 100% of the time  -% of the time  -CZ     Personal Safety WNL/WFL  -CZ WNL/WFL  -CZ     Personal Safety Interventions nonskid shoes/slippers when out of bed  -CZ nonskid shoes/slippers when out of bed  -CZ     Recorded by [CZ] Christopher Arnett, PT [CZ] Christopher Arnett, PT     Bed Mobility, Assessment/Treatment    Bed Mob, Supine to Sit, [place] independent  -CZ independent  -CZ     Bed Mob, Sit to Supine, [place] independent  -CZ independent  -CZ     Recorded by [CZ] Christopher Arnett, PT [CZ] Christopher Arnett PT     Transfer Assessment/Treatment    Transfers, Sit-Stand [place] independent  -CZ independent  -CZ     Transfers, Stand-Sit [place] independent  -CZ independent  -CZ     Recorded by [CZ] Christopher Arnett, PT [CZ] Christopher Arnett, PT     Gait Assessment/Treatment    Gait, [place] Level independent  -CZ      Gait, Distance (Feet) 50  -CZ      Gait, Gait Pattern Analysis swing-through gait  -CZ      Gait, Comment LOB to R x 2, able to self-recover, no falls.  -CZ      Recorded by [CZ] Christopher Arnett PT      Therapy Exercises    Bilateral Lower Extremities AROM:;20 reps;standing;hip abduction/adduction;hip extension;hip flexion;heel raises  -CZ AROM:;20 reps;standing;hip abduction/adduction;hip extension;hip flexion;heel slides  -CZ     Recorded by [CZ] Christopher Arnett, PT [CZ] Christopher Arnett PT     Positioning and Restraints    Pre-Treatment Position in bed  -CZ in bed  -CZ     Post Treatment Position bed  -CZ bed  -CZ     In Bed call light within reach;sitting  -CZ sitting EOB;call light within reach  -CZ     Recorded by [CZ] Christopher Arnett, PT [CZ] Christopher Arnett, PT       User Key  (r) = Recorded By, (t) = Taken By, (c) =  Cosigned By    Initials Name Effective Dates    CZ Christopher Arnett, PT 02/17/17 -                 IP PT Goals       03/21/17 0845 03/20/17 1543 03/20/17 1005    Gait Training PT STG    Gait Training Goal PT STG, Date Established   03/20/17  -CZ    Gait Training Goal PT STG, Time to Achieve   5 - 7 days  -CZ    Gait Training Goal PT STG, [place] Level   independent  -CZ    Gait Training Goal PT STG, Distance to Achieve   100  -CZ    Gait Training Goal PT STG, Additional Goal   Ambulating without an AD.  -CZ    Gait Training Goal PT STG, Date Goal Reviewed 03/21/17  -CZ 03/20/17  -CZ     Gait Training Goal PT STG, Outcome goal ongoing  -CZ goal ongoing  -CZ     Stair Training PT STG    Stair Training Goal PT STG, Date Established   03/20/17  -CZ    Stair Training Goal PT STG, Time to Achieve   5 - 7 days  -CZ    Stair Training Goal PT STG, Number of Steps   4   Patient has stairs at her daughter's house.  -CZ    Stair Training Goal PT STG, [place] Level   independent  -CZ    Stair Training Goal PT STG, Assist Device   1 handrail  -CZ    Stair Training Goal PT STG, Date Goal Reviewed 03/21/17  -CZ 03/20/17  -CZ     Stair Training Goal PT STG, Outcome goal ongoing  -CZ goal ongoing  -CZ     Physical Therapy PT LTG    Physical Therapy PT LTG, Date Established   03/20/17  -CZ    Physical Therapy PT LTG, Activity Type   Tinetti fall risk assessment.   -CZ    Physical Therapy PT LTG, Addisional Goal   Score 28/28 or low fall risk.  -CZ    Physical Therapy PT LTG, Date Goal Reviewed 03/21/17  -CZ 03/20/17  -CZ     Physical Therapy PT LTG, Outcome goal met  -CZ        User Key  (r) = Recorded By, (t) = Taken By, (c) = Cosigned By    Initials Name Provider Type    CZ Christopher Arnett, PT Physical Therapist          Physical Therapy Education     Title: PT OT SLP Therapies (Active)     Topic: Physical Therapy (Active)     Point: Mobility training (Active)    Learning Progress Summary    Learner Readiness Method  Response Comment Documented by Status   Patient Acceptance E NR Patient educated on the importance of increasing her activity tolerance and maintaining gains once she is discharged home.  03/20/17 1313 Active               Point: Home exercise program (Done)    Learning Progress Summary    Learner Readiness Method Response Comment Documented by Status   Patient Acceptance E,D BABATUNDE KYLE Educated patient on standing ther ex routine, encouraged her to continue once home.  03/21/17 1138 Done               Point: Body mechanics (Active)    Learning Progress Summary    Learner Readiness Method Response Comment Documented by Status   Patient Acceptance E,D NR Patient educated on proper weightshifting technique during SLS to avoid LOB. Patient tends to employ hip strategy before ankle strategies. CZ 03/20/17 1626 Active                      User Key     Initials Effective Dates Name Provider Type Discipline     02/17/17 -  Christopher Arnett PT Physical Therapist PT                    PT Recommendation and Plan  Anticipated Discharge Disposition: home  Planned Therapy Interventions: gait training, stair training, strengthening  PT Frequency: other (see comments) (5-14x/week)  Plan of Care Review  Plan Of Care Reviewed With: patient  Progress: improving  Outcome Summary/Follow up Plan: Patient alert and cooperative.  Fall risk lowered today (Tinetti: 28/28).  Patient performed standing ther ex to increase standing tolerance and SLS ability. Patient maintained O2 saturation above 95% on RA  throughout standing activities.           Outcome Measures       03/21/17 0845 03/20/17 1005       Tinetti Assessment    Tinetti Assessment yes  -CZ yes  -CZ     Sitting Balance 1  -CZ 1  -CZ     Arises 2  -CZ 2  -CZ     Attempts to Rise 2  -CZ 2  -CZ     Immediate Standing Balance (first 5 sec) 2  -CZ 2  -CZ     Standing Balance 2  -CZ 2  -CZ     Sternal Nudge (feet close together) 2  -CZ 2  -CZ     Eyes Closed (feet close together) 1   "-CZ 1  -CZ     Turning 360 Degrees- Steps 1  -CZ 1  -CZ     Turning 360 Degrees- Steadiness 1  -CZ 1  -CZ     Sitting Down 2  -CZ 2  -CZ     Tinetti Balance Score 16  -CZ 16  -CZ     Gait Initiation (immediate after told \"go\") 1  -CZ 1  -CZ     Step Length- Right Swing 1  -CZ 1  -CZ     Step Length- Left Swing 1  -CZ 1  -CZ     Foot Clearance- Right Foot 1  -CZ 0  -CZ     Foot Clearance- Left Foot 1  -CZ 0  -CZ     Step Symmetry 1  -CZ 1  -CZ     Step Continuity 1  -CZ 1  -CZ     Path (excursion) 2  -CZ 2  -CZ     Trunk 2  -CZ 2  -CZ     Base of Support 1  -CZ 1  -CZ     Gait Score 12  -CZ 10  -CZ     Tinetti Total Score 28  -CZ 26  -CZ     Functional Assessment    Outcome Measure Options  Tinetti  -CZ       User Key  (r) = Recorded By, (t) = Taken By, (c) = Cosigned By    Initials Name Provider Type    CZ Christopher Arnett, PT Physical Therapist           Time Calculation:         PT Charges       03/21/17 1142          Time Calculation    Start Time 0845  -CZ      Stop Time 0935  -CZ      Time Calculation (min) 50 min  -CZ      PT Received On 03/21/17  -CZ      Time Calculation- PT    Total Timed Code Minutes- PT 50 minute(s)  -CZ        User Key  (r) = Recorded By, (t) = Taken By, (c) = Cosigned By    Initials Name Provider Type    CZ Christopher Arnett, PT Physical Therapist          Therapy Charges for Today     Code Description Service Date Service Provider Modifiers Qty    10204791484 HC PT MOBILITY CURRENT 3/20/2017 Christopher Arnett, PT GP, CI 1    03429550051 HC PT MOBILITY PROJECTED 3/20/2017 Christopher Arnett, PT GP,  1    82044850337 HC PT EVAL MOD COMPLEXITY 1 3/20/2017 Christopher Arnett PT GP 1    16219917792 HC GAIT TRAINING EA 15 MIN 3/20/2017 Christopher Arnett PT GP 1    72219692006 HC PT THERAPEUTIC ACT EA 15 MIN 3/20/2017 Christopher Arnett, PT GP 2    65392848110 HC PT THERAPEUTIC ACT EA 15 MIN 3/20/2017 Christopher Arnett PT GP 2    94990990478 HC GAIT TRAINING EA 15 MIN 3/21/2017 Christopher Arnett, PT GP 1    " [id number]  PT THERAPEUTIC ACT EA 15 MIN 3/21/2017 Christopher Arnett, PT GP 2          PT G-Codes  PT Professional Judgement Used?: Yes  Outcome Measure Options: Tinetti  Score: 26  Functional Limitation: Mobility: Walking and moving around  Mobility: Walking and Moving Around Current Status (): At least 1 percent but less than 20 percent impaired, limited or restricted  Mobility: Walking and Moving Around Goal Status (): 0 percent impaired, limited or restricted    Christopher Arnett, PT  3/21/2017

## 2017-03-21 NOTE — PLAN OF CARE
Problem: Inpatient Physical Therapy  Goal: Gait Training Goal STG- PT  Outcome: Unable to achieve outcome(s) by discharge Date Met:  03/21/17 03/21/17 1634   Gait Training PT STG   Gait Training Goal PT STG, Date Goal Reviewed 03/21/17   Gait Training Goal PT STG, Outcome goal not met   Gait Training Goal PT STG, Reason Goal Not Met discharged from facility       Goal: Stair Training Goal STG- PT  Outcome: Unable to achieve outcome(s) by discharge Date Met:  03/21/17 03/21/17 1634   Stair Training PT STG   Stair Training Goal PT STG, Date Goal Reviewed 03/21/17   Stair Training Goal PT STG, Outcome goal not met   Stair Training Goal PT STG, Reason Goal Not Met discharged from facility       Goal: Physical Therapy Goal LTG- PT  Outcome: Outcome(s) achieved Date Met:  03/21/17 03/21/17 1634   Physical Therapy PT LTG   Physical Therapy PT LTG, Date Goal Reviewed 03/21/17   Physical Therapy PT LTG, Outcome goal met   Physical Therapy PT LTG, Reason Goal Not Met discharged from facility

## 2017-03-21 NOTE — PROGRESS NOTES
"FAMILY MEDICINE DAILY PROGRESS NOTE  NAME: Yamileth Slater  : 1960  MRN: [id number]     LOS: 5 days     PROVIDER OF SERVICE: Yadira Delarosa MD    Chief Complaint: Pneumonia of both lungs due to methicillin resistant Staphylococcus aureus (MRSA)    Subjective:     Interval History:  History taken from: patient  Patient admitted with acute hypoxic respiratory failure and sepsis d.t. MRSA pna, states her breathing is much improved, she still has cough.  Overnight pulse oximetry revealed desaturations to 79% on 2LNC. Spoke with pt regarding clindamycin \"allergy\", reported reaction is vaginal yeast infection, patient has tolerated this medication multiple times in the past, counseled that yeast infection is not an allergy, pt ok with discharge home on clindamycin.     Review of Systems:   Review of Systems   Constitutional: Negative for activity change, appetite change, chills and fever.   HENT: Negative for congestion and trouble swallowing.    Eyes: Negative for photophobia and visual disturbance.   Respiratory: Positive for cough, and wheezing.    Cardiovascular: Positive for leg swelling. Negative for chest pain and palpitations.   Gastrointestinal: Negative for abdominal pain, constipation, diarrhea, nausea and vomiting.   Endocrine: Negative for polydipsia and polyphagia.   Genitourinary: Negative for difficulty urinating, dysuria and frequency.   Musculoskeletal: Negative for arthralgias and gait problem.   Skin: Negative for color change, pallor and rash.   Neurological: Negative for weakness, light-headedness and headaches.   Psychiatric/Behavioral: Negative for sleep disturbance. The patient is not nervous/anxious.        Objective:     Vital Signs  Temp:  [96.7 °F (35.9 °C)-98.5 °F (36.9 °C)] 98 °F (36.7 °C)  Heart Rate:  [62-93] 87  Resp:  [18-20] 20  BP: (119-169)/(56-80) 140/65  Body mass index is 51.2 kg/(m^2).     Physical Exam  Physical Exam   Constitutional: She is oriented to " person, place, and time. She appears well-developed and well-nourished. She is active and cooperative. No distress.   Obese   HEENT: NCAT, EOMI, PERRL   Cardiovascular: Normal rate, regular rhythm, normal heart sounds and intact distal pulses.    Pulmonary/Chest: Effort normal. No respiratory distress. She has decreased breath sounds. She has wheezes (throughout).    Abdominal: Soft. Bowel sounds are normal. She exhibits no distension. There is no tenderness.   Genitourinary: There is no rash, tenderness or lesion on the right labia. There is no rash, tenderness or lesion on the left labia.   Genitourinary Comments: No abscess in groin   Musculoskeletal: Normal range of motion. She exhibits edema (1+ pitting bilateral lower ext).   Neurological: She is alert and oriented to person, place, and time.   Skin: Skin is warm and dry.   Multiple excoriations on face, arms, abdomen, legs without evidence of infection.   Vitals reviewed.      Medication Review    Current Facility-Administered Medications:   •  acetaminophen (TYLENOL) tablet 650 mg, 650 mg, Oral, Q4H PRN, Portillo Argueta MD  •  atorvastatin (LIPITOR) tablet 20 mg, 20 mg, Oral, Daily, Portillo Argueta MD, 20 mg at 03/20/17 0922  •  bisacodyl (DULCOLAX) EC tablet 5 mg, 5 mg, Oral, Daily PRN, Portillo Argueta MD  •  bisacodyl (DULCOLAX) suppository 10 mg, 10 mg, Rectal, Daily PRN, Portillo Argueta MD  •  budesonide-formoterol (SYMBICORT) 160-4.5 MCG/ACT inhaler 2 puff, 2 puff, Inhalation, BID - RT, Portillo Argueta MD, 2 puff at 03/20/17 1909  •  cetirizine (zyrTEC) tablet 10 mg, 10 mg, Oral, Daily, Portillo Argueta MD, 10 mg at 03/20/17 0922  •  clopidogrel (PLAVIX) tablet 75 mg, 75 mg, Oral, Daily, Portillo Argueta MD, 75 mg at 03/20/17 0921  •  cyclobenzaprine (FLEXERIL) tablet 10 mg, 10 mg, Oral, BID, Portillo Argueta MD, 10 mg at 03/20/17 1735  •  dextrose (D50W) solution 25 g, 25 g, Intravenous, Q15 Min PRN, Portillo Argueta MD  •  dextrose (GLUTOSE) oral gel 15 g, 15 g,  Oral, Q15 Min PRN, Portillo Argueta MD  •  enoxaparin (LOVENOX) syringe 40 mg, 40 mg, Subcutaneous, Q24H, Yadira Delarosa MD, 40 mg at 03/20/17 0921  •  furosemide (LASIX) tablet 40 mg, 40 mg, Oral, Daily, Alban Vázquez MD, 40 mg at 03/20/17 0922  •  gabapentin (NEURONTIN) capsule 800 mg, 800 mg, Oral, Q8H, Portillo Argueta MD, 800 mg at 03/21/17 0545  •  glucagon (human recombinant) (GLUCAGEN DIAGNOSTIC) injection 1 mg, 1 mg, Subcutaneous, Q15 Min PRN, Portillo Argueta MD  •  insulin aspart (novoLOG) injection 0-24 Units, 0-24 Units, Subcutaneous, 4x Daily AC & at Bedtime, Portillo Argueta MD, 4 Units at 03/20/17 1753  •  insulin aspart (novoLOG) injection 30 Units, 30 Units, Subcutaneous, TID With Meals, Alban Vázquez MD, 30 Units at 03/20/17 1753  •  insulin detemir (LEVEMIR) injection 95 Units, 95 Units, Subcutaneous, Nightly, Yadira Delarosa MD, 95 Units at 03/20/17 2155  •  insulin detemir (LEVEMIR) injection 95 Units, 95 Units, Subcutaneous, QAM, Yadira Delarosa MD, 95 Units at 03/21/17 0733  •  ipratropium-albuterol (DUO-NEB) nebulizer solution 3 mL, 3 mL, Nebulization, 4x Daily - RT, Portillo Argueta MD, 3 mL at 03/20/17 1902  •  lisinopril (PRINIVIL,ZESTRIL) tablet 10 mg, 10 mg, Oral, Q24H, Portillo Argueta MD, 10 mg at 03/20/17 0922  •  metoprolol tartrate (LOPRESSOR) tablet 50 mg, 50 mg, Oral, BID, Portillo Argueta MD, 50 mg at 03/20/17 1735  •  nicotine (NICODERM CQ) 14 MG/24HR patch 1 patch, 1 patch, Transdermal, Q24H, Portillo Argueta MD, 1 patch at 03/20/17 5386  •  nitroglycerin (NITROSTAT) SL tablet 0.4 mg, 0.4 mg, Sublingual, PRN, Portillo Argueta MD  •  ondansetron (ZOFRAN) injection 4 mg, 4 mg, Intravenous, Q6H PRN, Portillo Argueta MD  •  pantoprazole (PROTONIX) injection 40 mg, 40 mg, Intravenous, Q AM, Portillo Argueta MD, 40 mg at 03/21/17 2255  •  Pharmacy to dose vancomycin, , Does not apply, Continuous PRN, Alban Vázquez MD  •  potassium chloride (K-DUR) CR tablet 10 mEq, 10 mEq, Oral, Daily,  Portillo Argueta MD, 10 mEq at 03/20/17 0925  •  predniSONE (DELTASONE) tablet 40 mg, 40 mg, Oral, Q12H, Alban Vázquez MD, 40 mg at 03/20/17 2155  •  promethazine (PHENERGAN) tablet 25 mg, 25 mg, Oral, Q6H PRN, Portillo Argueta MD  •  sodium chloride 0.9 % flush 1-10 mL, 1-10 mL, Intravenous, PRN, Portillo Argueta MD, 10 mL at 03/16/17 2216  •  vancomycin (VANCOCIN) 2,000 mg in sodium chloride 0.9 % 500 mL IVPB, 15 mg/kg, Intravenous, Q18H, Alban Vázquez MD, Last Rate: 0 mL/hr at 03/20/17 0130, 2,000 mg at 03/20/17 1736     Diagnostic Data    Lab Results (last 24 hours)     Procedure Component Value Units Date/Time    POC Glucose Fingerstick [[id number]]  (Abnormal) Collected:  03/19/17 1654    Specimen:  Blood Updated:  03/20/17 0754     Glucose 299 (H) mg/dL       Sliding Scale AdminRN NotifiedMeter: [id number]: [id number] FIELDS        POC Glucose Fingerstick [[id number]]  (Abnormal) Collected:  03/20/17 0646    Specimen:  Blood Updated:  03/20/17 0755     Glucose 260 (H) mg/dL       RN NotifiedMeter: [id number]: [id number] PAGE TAMMIE       Vancomycin, Trough [[id number]]  (Normal) Collected:  03/20/17 1505    Specimen:  Blood Updated:  03/20/17 1538     Vancomycin Trough 13.25 mcg/mL     Blood Culture [[id number]]  (Normal) Collected:  03/16/17 2000    Specimen:  Blood from Hand, Right Updated:  03/20/17 2101     Blood Culture No growth at 4 days     Blood Culture [[id number]]  (Normal) Collected:  03/16/17 1950    Specimen:  Blood from Arm, Right Updated:  03/20/17 2101     Blood Culture No growth at 4 days     POC Glucose Fingerstick [[id number]]  (Abnormal) Collected:  03/20/17 1111    Specimen:  Blood Updated:  03/20/17 2154     Glucose 301 (H) mg/dL       Meter: [id number]: [id number] YANICK GALLOWAY       POC Glucose Fingerstick [[id number]]  (Abnormal) Collected:  03/20/17 2154    Specimen:  Blood Updated:  03/20/17 2237     Glucose 193 (H) mg/dL       Sliding Scale AdminMeter:  [id number]: [id number] [facility], [place]       POC Glucose Fingerstick [[id number]]  (Abnormal) Collected:  03/21/17 0544    Specimen:  Blood Updated:  03/21/17 0604     Glucose 188 (H) mg/dL       Sliding Scale AdminMeter: [id number]: [id number] [facility], [place]       CBC & Differential [[id number]] Collected:  03/21/17 0545    Specimen:  Blood Updated:  03/21/17 0629    Narrative:       The following orders were created for panel order CBC & Differential.  Procedure                               Abnormality         Status                     ---------                               -----------         ------                     CBC Auto Differential[[id number]]         Abnormal            Final result                 Please view results for these tests on the individual orders.    CBC Auto Differential [[id number]]  (Abnormal) Collected:  03/21/17 0545    Specimen:  Blood Updated:  03/21/17 0629     WBC 10.30 (H) 10*3/mm3      RBC 4.28 10*6/mm3      Hemoglobin 10.0 (L) g/dL      Hematocrit 32.4 (L) %      MCV 75.7 (L) fL      MCH 23.4 (L) pg      MCHC 30.9 (L) g/dL      RDW 18.0 (H) %      RDW-SD 48.8 (H) fl      MPV 9.1 fL      Platelets 371 10*3/mm3      Neutrophil % 73.3 %      Lymphocyte % 19.4 %      Monocyte % 3.6 %      Eosinophil % 0.1 %      Basophil % 0.2 %      Immature Grans % 3.4 (H) %      Neutrophils, Absolute 7.55 10*3/mm3      Lymphocytes, Absolute 2.00 10*3/mm3      Monocytes, Absolute 0.37 10*3/mm3      Eosinophils, Absolute 0.01 10*3/mm3      Basophils, Absolute 0.02 10*3/mm3      Immature Grans, Absolute 0.35 (H) 10*3/mm3     Comprehensive Metabolic Panel [[id number]]  (Abnormal) Collected:  03/21/17 0545    Specimen:  Blood Updated:  03/21/17 0653     Glucose 182 (H) mg/dL      BUN 26 (H) mg/dL      Creatinine 0.87 mg/dL      Sodium 141 mmol/L      Potassium 5.2 (H) mmol/L      Chloride 101 mmol/L      CO2 29.0 mmol/L      Calcium 9.1 mg/dL      Total Protein 7.1 g/dL      Albumin 3.60  g/dL      ALT (SGPT) 29 U/L      AST (SGOT) 60 (H) U/L      Alkaline Phosphatase 114 U/L      Total Bilirubin 0.3 mg/dL      eGFR Non African Amer 67 mL/min/1.73      Globulin 3.5 gm/dL      A/G Ratio 1.0 (L) g/dL      BUN/Creatinine Ratio 29.9 (H)      Anion Gap 11.0 mmol/L         I reviewed the patient's new clinical results.    Assessment/Plan:     Active Hospital Problems (** Indicates Principal Problem)    Diagnosis Date Noted   • **Pneumonia of both lungs due to methicillin resistant Staphylococcus aureus (MRSA) [J15.212] 03/16/2017     Priority: Medium     - previously on Vancomycin (3 days) for MRSA coverage, switch to PO clindamycin  -  DuoNebs  - Sputum culture -show MRSA  - CRP 28.30, trending down today is 1.5.   - Incentive spirometry, supplemental oxygen PRN    - Will discharge home on Clindamycin today     • Acute respiratory failure with hypoxia [J96.01] 03/16/2017     Priority: High     - Oxygen supplementation - wean to SpO2 > 90%   - prednisone 40mg BID   - Ambulate pt today without O2, document sats  - Duo-nebs       • Laryngitis, acute [J04.0] 03/17/2017   • Prophylactic hospital isolation [Z41.8] 03/16/2017     History of CRE infection continue contact precautions     • Diabetes mellitus type 2 in obese [E11.9, E66.9] 08/24/2016     - Levemir to 95u qhs;  Levemir 95 units qAM, novolog SSI  - Home meal time insulin       • Chronic obstructive pulmonary disease [J44.9] 08/24/2016     - Duo Nebs, Symbicort, oxygen supplementation, steroid     • Tobacco dependence syndrome [F17.200]      1/2ppd  - Nicotine patch ordered     • Sleep apnea [G47.30]      CPAP ordered  - 3/18/17 desaturation to 84% on CPAP  - overnight pulse oximetry: multiple desaturations, lowest SpO2 79% on 2LNC on 3/20/17  - case management consult for home O2     • Dyslipidemia [E78.5]      Home atorvastatin     • Morbid obesity due to excess calories [E66.01]      BMI 44.5, dietary consult ordered     • Essential hypertension  [I10]      Home lisinopril and metoprolol, will administer IV lasix 80 mg today, 40 mg BID thereafter     • Asthma [J45.909]    • GERD (gastroesophageal reflux disease) [K21.9]      Protonix     • Peripheral vascular disease [I73.9]    • Coronary arteriosclerosis [I25.10]      S/p CABG X3 in 2013, continue home Plavix, Metoprolol, and Atorvastatin        Resolved Hospital Problems    Diagnosis Date Noted Date Resolved   • Sepsis due to pneumonia [J18.9, A41.9] 03/17/2017 03/20/2017     Priority: High     -Ceftriaxone 1 g IV daily, plus vancomycin DuoNebs  -  MRSA PNA  - Legionella ag - negative, pneumococcal ag - negative.  - Lactic acid 3.1 on admission, repeat 2.0; now 1.2  - CRP 28.30 on admission now 1.5  - Incentive spirometry, supplemental oxygen PRN       DVT prophylaxis: Lovenox  Code status is Full Code    Plan for disposition:Home with home health sepsis protocol & respiratory assessment today    Patient will be sent home with prescription for home nebulizer machine and supplies, prescription for Duoneb solution sent electronically to pharmacy.  Pt needs home nebulizer treatments for therapy of Mild Persistent Asthma and COPD.  She has increased benefit from nebulizer treatments including improved air movement, decreased wheezes and improvement of shortness of air that are not otherwise obtained with inhaler medications.  Patient requires nebulizer to administer drug.      Signature  Yadira Delarosa MD PGY2  [place] [address]  Office: [phone]    This document has been electronically signed by Yadira Delarosa MD on March 21, 2017 7:47 AM

## 2017-03-21 NOTE — PLAN OF CARE
Problem: Patient Care Overview (Adult)  Goal: Plan of Care Review  Outcome: Ongoing (interventions implemented as appropriate)    03/21/17 0845   Coping/Psychosocial Response Interventions   Plan Of Care Reviewed With patient   Patient Care Overview   Progress improving   Outcome Evaluation   Outcome Summary/Follow up Plan Patient alert and cooperative. Fall risk lowered today (Tinetti: 28/28). Patient performed standing ther ex to increase standing tolerance and SLS ability. Patient maintained O2 saturation above 95% on RA throughout standing activities.          Problem: Inpatient Physical Therapy  Goal: Gait Training Goal STG- PT  Outcome: Ongoing (interventions implemented as appropriate)    03/21/17 0845   Gait Training PT STG   Gait Training Goal PT STG, Date Goal Reviewed 03/21/17   Gait Training Goal PT STG, Outcome goal ongoing       Goal: Stair Training Goal STG- PT  Outcome: Ongoing (interventions implemented as appropriate)    03/20/17 1005 03/21/17 0845   Stair Training PT STG   Stair Training Goal PT STG, Date Established 03/20/17 --    Stair Training Goal PT STG, Time to Achieve 5 - 7 days --    Stair Training Goal PT STG, Number of Steps 4  (Patient has stairs at her daughter's house.) --    Stair Training Goal PT STG, [place] Level independent --    Stair Training Goal PT STG, Assist Device 1 handrail --    Stair Training Goal PT STG, Date Goal Reviewed --  03/21/17   Stair Training Goal PT STG, Outcome --  goal ongoing       Goal: Physical Therapy Goal LTG- PT  Outcome: Outcome(s) achieved Date Met:  03/21/17 03/20/17 1005 03/21/17 0845   Physical Therapy PT LTG   Physical Therapy PT LTG, Date Established 03/20/17 --    Physical Therapy PT LTG, Activity Type Tinetti fall risk assessment.  --    Physical Therapy PT LTG, Addisional Goal Score 28/28 or low fall risk. --    Physical Therapy PT LTG, Date Goal Reviewed --  03/21/17   Physical Therapy PT LTG, Outcome --  goal met

## 2017-03-21 NOTE — THERAPY DISCHARGE NOTE
"Acute Care - Physical Therapy Discharge Summary  [facility]       Patient Name: Yamileth Slater  : 1960  MRN: [id number]    Today's Date: 3/21/2017  Onset of Illness/Injury or Date of Surgery Date: 17    Date of Referral to PT: 17  Referring Physician: Dr. Vázquez      Admit Date: 3/16/2017      PT Recommendation and Plan    Visit Dx:    ICD-10-CM ICD-9-CM   1. Pneumonia of both lungs due to infectious organism, unspecified part of lung J18.9 483.8   2. Acute respiratory failure with hypoxia J96.01 518.81   3. Impaired physical mobility Z74.09 781.99   4. Chronic obstructive pulmonary disease with acute lower respiratory infection J44.0 496     519.8   5. MIKE on CPAP G47.33 327.23   6. Mild persistent asthma without complication J45.30 493.90   7. Pneumonia of both lower lobes due to methicillin resistant Staphylococcus aureus (MRSA) J15.212 482.42   8. Sepsis due to pneumonia J18.9 486    A41.9 995.91             Outcome Measures       17 0845 17 1005       Tinetti Assessment    Tinetti Assessment yes  -CZ yes  -CZ     Sitting Balance 1  -CZ 1  -CZ     Arises 2  -CZ 2  -CZ     Attempts to Rise 2  -CZ 2  -CZ     Immediate Standing Balance (first 5 sec) 2  -CZ 2  -CZ     Standing Balance 2  -CZ 2  -CZ     Sternal Nudge (feet close together) 2  -CZ 2  -CZ     Eyes Closed (feet close together) 1  -CZ 1  -CZ     Turning 360 Degrees- Steps 1  -CZ 1  -CZ     Turning 360 Degrees- Steadiness 1  -CZ 1  -CZ     Sitting Down 2  -CZ 2  -CZ     Tinetti Balance Score 16  -CZ 16  -CZ     Gait Initiation (immediate after told \"go\") 1  -CZ 1  -CZ     Step Length- Right Swing 1  -CZ 1  -CZ     Step Length- Left Swing 1  -CZ 1  -CZ     Foot Clearance- Right Foot 1  -CZ 0  -CZ     Foot Clearance- Left Foot 1  -CZ 0  -CZ     Step Symmetry 1  -CZ 1  -CZ     Step Continuity 1  -CZ 1  -CZ     Path (excursion) 2  -CZ 2  -CZ     Trunk 2  -CZ 2  -CZ     Base of Support 1  -CZ 1  -CZ     Gait Score 12  " -CZ 10  -CZ     Tinetti Total Score 28  -CZ 26  -CZ     Functional Assessment    Outcome Measure Options  Tinetti  -CZ       User Key  (r) = Recorded By, (t) = Taken By, (c) = Cosigned By    Initials Name Provider Type    ROSANNA Arnett, PT Physical Therapist                PT Charges       03/21/17 1142          Time Calculation    Start Time 0845  -CZ      Stop Time 0935  -CZ      Time Calculation (min) 50 min  -CZ      PT Received On 03/21/17  -CZ      Time Calculation- PT    Total Timed Code Minutes- PT 50 minute(s)  -CZ        User Key  (r) = Recorded By, (t) = Taken By, (c) = Cosigned By    Initials Name Provider Type    CZ Christopher Arnett, PT Physical Therapist                  IP PT Goals       03/21/17 1634 03/21/17 0845 03/20/17 1543    Gait Training PT STG    Gait Training Goal PT STG, Date Goal Reviewed 03/21/17  -MN 03/21/17  -CZ 03/20/17  -CZ    Gait Training Goal PT STG, Outcome goal not met  -MN goal ongoing  -CZ goal ongoing  -CZ    Gait Training Goal PT STG, Reason Goal Not Met discharged from facility  -MN      Stair Training PT STG    Stair Training Goal PT STG, Date Goal Reviewed 03/21/17  -MN 03/21/17  -CZ 03/20/17  -CZ    Stair Training Goal PT STG, Outcome goal not met  -MN goal ongoing  -CZ goal ongoing  -CZ    Stair Training Goal PT STG, Reason Goal Not Met discharged from facility  -MN      Physical Therapy PT LTG    Physical Therapy PT LTG, Date Goal Reviewed 03/21/17  -MN 03/21/17  -CZ 03/20/17  -CZ    Physical Therapy PT LTG, Outcome goal met  -MN goal met  -CZ     Physical Therapy PT LTG, Reason Goal Not Met discharged from facility  -MN        03/20/17 1005          Gait Training PT STG    Gait Training Goal PT STG, Date Established 03/20/17  -CZ      Gait Training Goal PT STG, Time to Achieve 5 - 7 days  -CZ      Gait Training Goal PT STG, [place] Level independent  -CZ      Gait Training Goal PT STG, Distance to Achieve 100  -CZ      Gait Training Goal PT STG, Additional  Goal Ambulating without an AD.  -CZ      Stair Training PT STG    Stair Training Goal PT STG, Date Established 03/20/17  -CZ      Stair Training Goal PT STG, Time to Achieve 5 - 7 days  -CZ      Stair Training Goal PT STG, Number of Steps 4   Patient has stairs at her daughter's house.  -CZ      Stair Training Goal PT STG, [place] Level independent  -CZ      Stair Training Goal PT STG, Assist Device 1 handrail  -CZ      Physical Therapy PT LTG    Physical Therapy PT LTG, Date Established 03/20/17  -CZ      Physical Therapy PT LTG, Activity Type Tinetti fall risk assessment.   -CZ      Physical Therapy PT LTG, Addisional Goal Score 28/28 or low fall risk.  -CZ        User Key  (r) = Recorded By, (t) = Taken By, (c) = Cosigned By    Initials Name Provider Type    KAMALJIT Lindsay, PT Physical Therapist    CZ Christopher Arnett, PT Physical Therapist              PT Discharge Summary  Anticipated Discharge Disposition: home  Reason for Discharge: Discharge from facility, Per MD order  Outcomes Achieved: Patient able to partially acheive established goals  Discharge Destination: Home with home health      Jessica Lindsay, PT   3/21/2017

## 2017-03-21 NOTE — PLAN OF CARE
Problem: Pneumonia (Adult)  Goal: Signs and Symptoms of Listed Potential Problems Will be Absent or Manageable (Pneumonia)  Outcome: Ongoing (interventions implemented as appropriate)    Problem: Diabetes, Type 2 (Adult)  Goal: Signs and Symptoms of Listed Potential Problems Will be Absent or Manageable (Diabetes, Type 2)  Outcome: Ongoing (interventions implemented as appropriate)    Problem: Patient Care Overview (Adult)  Goal: Plan of Care Review  Outcome: Ongoing (interventions implemented as appropriate)    03/21/17 0243   Coping/Psychosocial Response Interventions   Plan Of Care Reviewed With patient   Patient Care Overview   Progress no change   Outcome Evaluation   Outcome Summary/Follow up Plan pt rested well during the night; was able to tolerate        03/21/17 0243   Coping/Psychosocial Response Interventions   Plan Of Care Reviewed With patient   Patient Care Overview   Progress no change   Outcome Evaluation   Outcome Summary/Follow up Plan pt rested well during the night; was able to tolerate room air well    room air well        Goal: Adult Individualization and Mutuality  Outcome: Ongoing (interventions implemented as appropriate)  Goal: Discharge Needs Assessment  Outcome: Ongoing (interventions implemented as appropriate)

## 2017-03-21 NOTE — PROGRESS NOTES
FAMILY MEDICINE PROGRESS NOTE  NAME: Yamileth Slater  : 1960  MRN: [id number]     LOS: 4 days   Full Code  PROVIDER OF SERVICE:     Chief Complaint:  Pneumonia of both lungs due to methicillin resistant Staphylococcus aureus (MRSA)    Subjective     Interval History:  History taken from: patient  Subjective: Patient is a 58 yo  female who was admitted for sepsis secondary to pneumonia.  Patient also has laryngitis.  She is feeling little better the last 2 days.    Review of Systems:   Review of Systems   Constitutional: Negative for activity change, appetite change, chills and fever.   HENT: Positive for sore throat and voice change. Negative for congestion and trouble swallowing.    Eyes: Negative for photophobia and visual disturbance.   Respiratory: Positive for cough, shortness of breath (much improved since admission) and wheezing.    Cardiovascular: Positive for leg swelling. Negative for chest pain and palpitations.   Gastrointestinal: Negative for abdominal pain, constipation, diarrhea, nausea and vomiting.   Endocrine: Negative for polydipsia and polyphagia.   Genitourinary: Negative for difficulty urinating, dysuria and frequency.   Musculoskeletal: Negative for arthralgias and gait problem.   Skin: Positive for wound (complains of abscess in groin). Negative for color change, pallor and rash.   Neurological: Negative for weakness, light-headedness and headaches.   Psychiatric/Behavioral: Negative for sleep disturbance. The patient is not nervous/anxious.        Objective     Vital Signs  Temp:  [96.2 °F (35.7 °C)-98.3 °F (36.8 °C)] 97.6 °F (36.4 °C)  Heart Rate:  [64-93] 66  Resp:  [18-20] 20  BP: (119-174)/(56-82) 119/56    Physical Exam  Physical Exam   Constitutional: She is oriented to person, place, and time. She appears well-developed and well-nourished. No distress.   HENT:   Voice hoarse   Cardiovascular: Normal rate, regular rhythm and intact distal pulses.  Exam reveals no  gallop and no friction rub.    Murmur heard.  Pulmonary/Chest: Effort normal. No respiratory distress. She has wheezes. She has no rales.   Diminished breath sounds in the bases   Abdominal: Soft. Bowel sounds are normal. She exhibits no distension. There is no tenderness. There is no rebound and no guarding.   Musculoskeletal: She exhibits edema.   Neurological: She is alert and oriented to person, place, and time.   Skin: She is not diaphoretic.   Psychiatric: She has a normal mood and affect. Her behavior is normal. Judgment and thought content normal.   Nursing note and vitals reviewed.      Medication Review    Current Facility-Administered Medications:   •  acetaminophen (TYLENOL) tablet 650 mg, 650 mg, Oral, Q4H PRN, Portillo Argueta MD  •  atorvastatin (LIPITOR) tablet 20 mg, 20 mg, Oral, Daily, Portillo Argueta MD, 20 mg at 03/20/17 0922  •  bisacodyl (DULCOLAX) EC tablet 5 mg, 5 mg, Oral, Daily PRN, Portillo Argueta MD  •  bisacodyl (DULCOLAX) suppository 10 mg, 10 mg, Rectal, Daily PRN, Portillo Argueta MD  •  budesonide-formoterol (SYMBICORT) 160-4.5 MCG/ACT inhaler 2 puff, 2 puff, Inhalation, BID - RT, Portillo Argueta MD, 2 puff at 03/20/17 1909  •  cetirizine (zyrTEC) tablet 10 mg, 10 mg, Oral, Daily, Portillo Argueta MD, 10 mg at 03/20/17 0922  •  clopidogrel (PLAVIX) tablet 75 mg, 75 mg, Oral, Daily, Portillo Argueta MD, 75 mg at 03/20/17 0921  •  cyclobenzaprine (FLEXERIL) tablet 10 mg, 10 mg, Oral, BID, Portillo Argueta MD, 10 mg at 03/20/17 173  •  dextrose (D50W) solution 25 g, 25 g, Intravenous, Q15 Min PRN, Portillo Argueta MD  •  dextrose (GLUTOSE) oral gel 15 g, 15 g, Oral, Q15 Min PRN, Portillo Argueta MD  •  enoxaparin (LOVENOX) syringe 40 mg, 40 mg, Subcutaneous, Q24H, Yadira Delarosa MD, 40 mg at 03/20/17 0921  •  furosemide (LASIX) tablet 40 mg, 40 mg, Oral, Daily, Alban Vázquez MD, 40 mg at 03/20/17 0922  •  gabapentin (NEURONTIN) capsule 800 mg, 800 mg, Oral, Q8H, Portillo Argueta MD, 800 mg at  03/20/17 2155  •  glucagon (human recombinant) (GLUCAGEN DIAGNOSTIC) injection 1 mg, 1 mg, Subcutaneous, Q15 Min PRN, Portillo Argueta MD  •  insulin aspart (novoLOG) injection 0-24 Units, 0-24 Units, Subcutaneous, 4x Daily AC & at Bedtime, Portillo Argueta MD, 4 Units at 03/20/17 1753  •  insulin aspart (novoLOG) injection 30 Units, 30 Units, Subcutaneous, TID With Meals, Alban Vázquez MD, 30 Units at 03/20/17 1753  •  insulin detemir (LEVEMIR) injection 95 Units, 95 Units, Subcutaneous, Nightly, Yadira Delarosa MD, 95 Units at 03/20/17 2155  •  insulin detemir (LEVEMIR) injection 95 Units, 95 Units, Subcutaneous, QAM, Yadira Delarosa MD, 95 Units at 03/20/17 0714  •  ipratropium-albuterol (DUO-NEB) nebulizer solution 3 mL, 3 mL, Nebulization, 4x Daily - RT, Portillo Argueta MD, 3 mL at 03/20/17 1902  •  lisinopril (PRINIVIL,ZESTRIL) tablet 10 mg, 10 mg, Oral, Q24H, Portillo Argueta MD, 10 mg at 03/20/17 0922  •  metoprolol tartrate (LOPRESSOR) tablet 50 mg, 50 mg, Oral, BID, Portillo Argueta MD, 50 mg at 03/20/17 1735  •  nicotine (NICODERM CQ) 14 MG/24HR patch 1 patch, 1 patch, Transdermal, Q24H, Portillo Argueta MD, 1 patch at 03/20/17 2155  •  nitroglycerin (NITROSTAT) SL tablet 0.4 mg, 0.4 mg, Sublingual, PRN, Portillo Argueta MD  •  ondansetron (ZOFRAN) injection 4 mg, 4 mg, Intravenous, Q6H PRN, Portillo Argueta MD  •  pantoprazole (PROTONIX) injection 40 mg, 40 mg, Intravenous, Q AM, Portillo Argueta MD, 40 mg at 03/20/17 0535  •  Pharmacy to dose vancomycin, , Does not apply, Continuous PRN, Alban Vázquez MD  •  potassium chloride (K-DUR) CR tablet 10 mEq, 10 mEq, Oral, Daily, Portillo Argueta MD, 10 mEq at 03/20/17 0925  •  predniSONE (DELTASONE) tablet 40 mg, 40 mg, Oral, Q12H, Alban Vázquez MD, 40 mg at 03/20/17 2155  •  promethazine (PHENERGAN) tablet 25 mg, 25 mg, Oral, Q6H PRN, Portillo Argueta MD  •  sodium chloride 0.9 % flush 1-10 mL, 1-10 mL, Intravenous, PRN, Portillo Argueta MD, 10 mL at 03/16/17 2216  •   vancomycin (VANCOCIN) 2,000 mg in sodium chloride 0.9 % 500 mL IVPB, 15 mg/kg, Intravenous, Q18H, Alban Vázquez MD, Last Rate: 0 mL/hr at 03/20/17 0130, 2,000 mg at 03/20/17 1736     Diagnostic Data      I reviewed the patient's new clinical results and imaging.      Assessment/Plan     Active Hospital Problems (** Indicates Principal Problem)    Diagnosis Date Noted   • **Pneumonia of both lungs due to methicillin resistant Staphylococcus aureus (MRSA) [J15.212] 03/16/2017     - Ceftriaxone 1 g IV daily day 4. Vancomycin day 3 for MRSA coverage, DuoNebs  - Sputum culture -show MRSA  - CRP 28.30, trending down today is 1.5.   - Incentive spirometry, supplemental oxygen PRN    - Will discharge home on Bactrim     • Laryngitis, acute [J04.0] 03/17/2017   • Acute respiratory failure with hypoxia [J96.01] 03/16/2017     - Oxygen supplementation - wean to SpO2 > 90%   - prednisone 40mg BID   - Ambulate pt today without O2, document sats  - Duo-nebs       • Prophylactic hospital isolation [Z41.8] 03/16/2017     History of CRE infection continue contact precautions     • Diabetes mellitus type 2 in obese [E11.9, E66.9] 08/24/2016     - Increase night time Levemir to 95u qhs;  Levemir 90 units qAM, novolog SSI  -will add meal time insulin which she takes at home.   -blood sugar is better control today.          • Chronic obstructive pulmonary disease [J44.9] 08/24/2016     - Duo Nebs, Symbicort, oxygen supplementation, steroid     • Tobacco dependence syndrome [F17.200]      1/2ppd  - Nicotine patch ordered     • Sleep apnea [G47.30]      CPAP ordered  - 3/18/17 desaturation to 84% on CPAP, will do overnight pulse oximetry to qualify pt for home O2     • Dyslipidemia [E78.5]      Home atorvastatin     • Morbid obesity due to excess calories [E66.01]      BMI 44.5, dietary consult ordered     • Essential hypertension [I10]      Home lisinopril and metoprolol, will administer IV lasix 80 mg today, 40 mg BID thereafter     •  Asthma [J45.909]    • GERD (gastroesophageal reflux disease) [K21.9]      Protonix     • Peripheral vascular disease [I73.9]    • Coronary arteriosclerosis [I25.10]      S/p CABG X3 in 2013, continue home Plavix, Metoprolol, and Atorvastatin        Resolved Hospital Problems    Diagnosis Date Noted Date Resolved   • Sepsis due to pneumonia [J18.9, A41.9] 03/17/2017 03/20/2017     -Ceftriaxone 1 g IV daily, plus vancomycin DuoNebs  -  MRSA PNA  - Legionella ag - negative, pneumococcal ag - negative.  - Lactic acid 3.1 on admission, repeat 2.0; now 1.2  - CRP 28.30 on admission now 1.5  - Incentive spirometry, supplemental oxygen PRN       I have reviewed the notes, assessments, and/or procedures performed by Dr. Delarosa, I concur with her documentation of Yamileth Slater.      DVT prophylaxis: Heparin      Disposition:Home with home health          This document has been electronically signed by Charlette Bowden MD on March 20, 2017 11:17 PM          This document has been electronically signed by Charlette Bowden MD on March 20, 2017 11:17 PM

## 2017-03-21 NOTE — DISCHARGE PLACEMENT REQUEST
"Yamileth Slater (57 y.o. Female)     Date of Birth Social Security Number Address Home Phone MRN    1960  [address]  [place] [address] [phone] [id number]    Jew Marital Status          Non-Christian        Admission Date Admission Type Admitting Provider Attending Provider Department, Room/Bed    3/16/17 Emergency Portillo Raymundo MD Weisenburger, Abigail, MD [address], 360/1    Discharge Date Discharge Disposition Discharge Destination                      Attending Provider: Yadira Delarosa MD     Allergies:  Other    Isolation:  Contact, Droplet   Infection:  MRSA (03/18/17), CRE (08/12/16)   Code Status:  FULL    Ht:  62.01\" (157.5 cm)   Wt:  279 lb 15.8 oz (127 kg)    Admission Cmt:  None   Principal Problem:  Pneumonia of both lungs due to methicillin resistant Staphylococcus aureus (MRSA) [J15.212] More...                 Active Insurance as of 3/16/2017     Primary Coverage     Payor Plan Insurance Group Employer/Plan Group    MEDICARE MEDICARE A & B      Payor Plan Address Payor Plan Phone Number Effective From Effective To    [address] [phone] 1/1/2016     [place] [address]       Subscriber Name Subscriber Birth Date Member ID       YAMILETH SLATER 1960 [id number]                 Emergency Contacts      (Rel.) Home Phone Work Phone Mobile Phone    Yamileth Chavez (Daughter) [phone]               History & Physical      Portillo Raymundo MD at 3/16/2017  5:51 PM     Attestation signed by Pauline Martinez MD at 3/16/2017  8:13 PM        I have seen the patient.  I have reviewed the notes, assessments, and/or procedures performed by Dr. Raymundo, I concur with her/his documentation and assessment and plan for Yamileth Slater.          This document has been electronically signed by Pauline Martinez MD on March 16, 2017 8:13 PM                                "       FAMILY MEDICINE HISTORY AND PHYSICAL  NAME: Yamileth Slater  : 1960  MRN: [id number]    DATE OF ADMISSION: 17    DATE & TIME SEEN: 17 7:11 PM    PCP: Yadira Delarosa MD    CHIEF COMPLAINT Shortness of breath    HPI:  Yamileth Slater is a 57 y.o. female who presents with shortness of breath, cough, fever of 103.0*F yesterday reportedly. Fatigue started about 4 days ago with dry cough, headache, wheezing, and sore throat. Coughing has increased since symptom onset. Patient got her neighbor's oxygen yesterday to see if she could get some symptom relief and states that it did help. She had it at 3L per minute through nasal cannula. Monday she first experienced fever and took her temperature and found it to be 101.6*F and been ranging between the 102's to 103 degrees fahrenheit since. Patient has had difficulty sleeping with the chronic coughing but reports that she uses a CPAP machine at night. Patient's  today decided that his wife needed to be checked out and brought her to the emergency department for further evaluation. She has received Solumedrol and a dose of Azithromycin and Ceftriaxone. CXR showed signs of bilateral pneumonia and a possible small right pleural effusion. She is currently in bed saturating at 97% with 3 L oxygen through nasal cannula. She states that she feels better since arrival in the ER, and with oxygen and medication administration.    CONCURRENT MEDICAL HISTORY:  Past Medical History   Diagnosis Date   • Anxiety states    • Asthma    • Candidiasis of mouth    • Candidiasis of vulva and vagina    • Carotid artery stenosis      DWIGHT 0-15%, LICA 0-15%. LICA stent 2014.   • Chest pain    • Chronic folliculitis    • Chronic obstructive lung disease    • Coronary arteriosclerosis    • Diabetes mellitus      no retinopathy; A1C 9.1      • Dyslipidemia    • Dysphagia    • Encounter for general adult medical examination with abnormal findings    •  Essential hypertension    • Excoriated eczema      asteosis/keratosis   • Furuncle of neck    • GERD (gastroesophageal reflux disease)    • History of colon polyps    • Hypertensive disorder    • Infection of skin and subcutaneous tissue      rt perineal abscess   • Infestation by Sarcoptes scabiei deepak hominis    • Kidney stone    • Leukocytosis    • Lower limb anomaly      Abscess of lower limb - ANTX causing n/v      • Mixed hyperlipidemia    • Morbid obesity due to excess calories      BMI 44.5   • Need for vaccination    • Neurologic disorder associated with diabetes mellitus    • Non-healing surgical wound      LLE EVHa   • Pain      LLE   • Peripheral vascular disease    • Rash    • Shoulder joint pain    • Sleep apnea    • Surgical follow-up care      5/27/14 L carotid stent   • Tobacco dependence syndrome      1/2ppd      • Type 2 diabetes mellitus    • Viral wart      RIGHT middle phalanx   • Vitamin D deficiency        PAST SURGICAL HISTORY:  Past Surgical History   Procedure Laterality Date   • Coronary artery bypass graft  11/15/2013     CABG x 3 with LIMA to LAD and coronary endarterectomy to LAD, SVG to Ramus intermedius branch and SVG to PDA.    • Cardiac catheterization  08/09/2013     Severe multivessel CAD with critical lesions noted in the RCA, obtuse marginal two, ramus intermemdious, and LAD as described above. Normal LV systolic function with no wall motion abnormality.    • Cardiac catheterization  05/27/2014      LEFT Carotid Stent    • Colonoscopy  05/02/2016     Normal colon.Diverticulosis in the sigmoid colon.No specimens collected.    • Other surgical history  01/25/2016     DESTRUCTION OF BENIGN LESION      • Endoscopy  09/23/2015      Esophageal stricture present.Normal stomach.Normal duodenum.    • Endoscopy  11/16/2015     w/ dilatation - Esophageal stricture present,Dilatation performed.Normal stomach and duodenum.    • Other surgical history  04/18/2014     ear/neck - L attempted  CEA, Neck Dissection    • Incision and drainage abscess  01/02/2016     Incision and drainage of right perineal abscess.    • Incision and drainage abscess  02/18/2014     Incision and Drainage of abscess of left lower leg        FAMILY HISTORY:  Family History   Problem Relation Age of Onset   • Coronary artery disease Other    • Diabetes Other    • Heart disease Other    • Hypertension Other         SOCIAL HISTORY:  Social History     Social History   • Marital status:      Spouse name: N/A   • Number of children: N/A   • Years of education: N/A     Occupational History   • Not on file.     Social History Main Topics   • Smoking status: Current Every Day Smoker     Types: Cigarettes   • Smokeless tobacco: Not on file   • Alcohol use No   • Drug use: No   • Sexual activity: Not on file     Other Topics Concern   • Not on file     Social History Narrative       MEDICATIONS:    (Not in a hospital admission)    Current Facility-Administered Medications:   •  Insert peripheral IV, , , Once **AND** sodium chloride 0.9 % flush 10 mL, 10 mL, Intravenous, PRN, Abel Bryan MD    Current Outpatient Prescriptions:   •  atorvastatin (LIPITOR) 20 MG tablet, TAKE 1 TABLET BY MOUTH DAILY., Disp: 30 tablet, Rfl: 3  •  Blood Glucose Monitoring Suppl ([employer] BLOOD GLUCOSE METER) W/DEVICE kit, 1 each 3 (Three) Times a Day., Disp: 1 kit, Rfl: 3  •  budesonide-formoterol (SYMBICORT) 160-4.5 MCG/ACT inhaler, Inhale 2 puffs 2 (Two) Times a Day., Disp: 1 inhaler, Rfl: 6  •  cetirizine (zyrTEC) 10 MG tablet, Take 1 tablet by mouth Daily., Disp: 30 tablet, Rfl: 3  •  chlorhexidine (HIBICLENS) 4 % external liquid, Apply  topically Daily., Disp: 118 mL, Rfl: 0  •  clobetasol (CLOBEX) 0.05 % lotion, apply to skin bid x 1-2w then prn. generic, Disp: 118 g, Rfl: 3  •  clopidogrel (PLAVIX) 75 MG tablet, TAKE 1 TABLET BY MOUTH DAILY., Disp: 30 tablet, Rfl: 3  •  cyclobenzaprine (FLEXERIL) 10 MG tablet, Take 1 tablet by mouth 2 (Two) Times  a Day., Disp: 60 tablet, Rfl: 2  •  furosemide (LASIX) 40 MG tablet, Take 1 tablet by mouth Daily., Disp: 30 tablet, Rfl: 3  •  gabapentin (NEURONTIN) 800 MG tablet, Take 1 tablet by mouth 3 (Three) Times a Day., Disp: 90 tablet, Rfl: 3  •  glucose blood test strip, 1 each by Other route 4 (Four) Times a Day. Use as instructed, Disp: 100 each, Rfl: 12  •  insulin glargine (LANTUS) 100 UNIT/ML injection, Inject 90 Units under the skin Every 12 (Twelve) Hours., Disp: 1 each, Rfl: 11  •  Insulin Lispro (HUMALOG KWIKPEN) 100 UNIT/ML solution pen-injector, Inject 45 Units under the skin 3 (Three) Times a Day Before Meals., Disp: 60 mL, Rfl: 11  •  Insulin Pen Needle (NOVOFINE) 30G X 8 MM misc, As directed 4 times daily, Disp: 100 each, Rfl: 11  •  lisinopril (PRINIVIL,ZESTRIL) 10 MG tablet, TAKE 1 TABLET BY MOUTH DAILY., Disp: 30 tablet, Rfl: 3  •  metFORMIN (GLUMETZA) 1000 MG (MOD) 24 hr tablet, , Disp: , Rfl: 3  •  metFORMIN (GLUMETZA) 1000 MG (MOD) 24 hr tablet, TAKE 1 TABLET BY MOUTH 2 (TWO) TIMES A DAY WITH MEALS., Disp: 60 tablet, Rfl: 3  •  metoprolol tartrate (LOPRESSOR) 50 MG tablet, Take 1 tablet by mouth 2 (Two) Times a Day., Disp: 60 tablet, Rfl: 3  •  naproxen (NAPROSYN) 500 MG tablet, Take 1 tablet by mouth 2 (Two) Times a Day With Meals., Disp: 60 tablet, Rfl: 3  •  nitroglycerin (NITROSTAT) 0.4 MG SL tablet, Place 1 tablet under the tongue As Needed for chest pain. Take no more than 3 doses in 15 minutes., Disp: 30 tablet, Rfl: 3  •  omeprazole-sodium bicarbonate (ZEGERID)  MG per capsule, , Disp: , Rfl: 3  •  pantoprazole (PROTONIX) 40 MG EC tablet, TAKE 1 TABLET BY MOUTH DAILY., Disp: 30 tablet, Rfl: 3  •  permethrin (ELIMITE) 5 % cream, Apply  topically. Massage into skin from head to feet, remove 8-14 hours later with water, Disp: , Rfl:   •  potassium chloride (K-DUR) 10 MEQ CR tablet, TAKE 1 TABLET BY MOUTH EVERY NIGHT., Disp: 30 tablet, Rfl: 3  •  promethazine (PHENERGAN) 25 MG tablet, Take  25 mg by mouth every 6 (six) hours as needed for nausea or vomiting., Disp: , Rfl:   ALLERGIES:  Clindamycin/lincomycin and Other    REVIEW OF SYSTEMS  Review of Systems  General:negative for - chills, hot flashes, malaise, night sweats, weight gain or weight loss. Positive for fever and fatigue. Positive for intermittent headaches.  Psychological: negative for - anxiety, depression, sleep disturbances or suicidal ideation  Ophthalmic: negative for - blurry vision or loss of vision  ENT: negative for - hearing change, nasal congestion or sore throat  Hematological and Lymphatic: negative for - jaundice  Endocrine: negative for - hair pattern changes, skin changes or temperature intolerance  Respiratory: Positive for cough, wheezing, and shortness of breath aggravated with exertion.  Cardiovascular: no chest pain.  Gastrointestinal: no  Nausea/vomiting, abdominal pain, change in bowel habits, or black or bloody stools  Genito-Urinary: no dysuria, trouble voiding, or hematuria  Musculoskeletal: negative for - joint pain or muscle pain  Neurological: negative for - dizziness,numbness/tingling or seizures    PHYSICAL EXAM:  Temp:  [98.1 °F (36.7 °C)] 98.1 °F (36.7 °C)  Heart Rate:  [75-82] 80  Resp:  [22] 22  BP: (137-170)/() 165/77  Physical Exam  General Appearance:    Alert, cooperative, no distress, appears stated age   Head:    Normocephalic, without obvious abnormality, atraumatic   Eyes:    PERRL, conjunctiva/corneas clear, EOM's intact   Ears:    Normal TM's and external ear canals, both ears   Nose:   Nares normal, septum midline, mucosa normal, no drainage     or sinus tenderness   Throat:   Lips, mucosa, and tongue normal; teeth and gums normal   Neck:   Supple, symmetrical, trachea midline, no adenopathy;     thyroid:  no enlargement/tenderness/nodules   Back:     Symmetric, no curvature, ROM normal, no CVA tenderness   Lungs:     Wheezing in bilateral lower lung fields, decreased breath sounds in  bilateral lower lung fields.   Chest Wall:    No tenderness or deformity    Heart:    Regular rate and rhythm, S1 and S2 normal, no murmur, rub    or gallop   Abdomen:     Soft, non-tender, bowel sounds active all four quadrants,     no masses, no organomegaly   Extremities:   Extremities normal, atraumatic, no cyanosis or edema.Positive for 1+ pitting edema in bilateral lower extremities.    Pulses:   2+ and symmetric all extremities   Skin:   Skin color, texture, turgor normal, no rashes or lesions   Lymph nodes:   Cervical, supraclavicular nodes normal   Neurologic:   CNII-XII grossly intact     DIAGNOSTIC DATA:   Lab Results (last 24 hours)     Procedure Component Value Units Date/Time    CBC & Differential [[id number]] Collected:  03/16/17 1100    Specimen:  Blood Updated:  03/16/17 1121    Narrative:       The following orders were created for panel order CBC & Differential.  Procedure                               Abnormality         Status                     ---------                               -----------         ------                     CBC Auto Differential[[id number]]         Abnormal            Final result                 Please view results for these tests on the individual orders.    CBC Auto Differential [[id number]]  (Abnormal) Collected:  03/16/17 1100    Specimen:  Blood Updated:  03/16/17 1121     WBC 9.56 10*3/mm3      RBC 3.74 (L) 10*6/mm3      Hemoglobin 8.8 (L) g/dL      Hematocrit 28.3 (L) %      MCV 75.7 (L) fL      MCH 23.5 (L) pg      MCHC 31.1 (L) g/dL      RDW 17.4 (H) %      RDW-SD 48.7 (H) fl      MPV 9.1 fL      Platelets 249 10*3/mm3      Neutrophil % 61.0 %      Lymphocyte % 27.7 %      Monocyte % 6.7 %      Eosinophil % 3.5 %      Basophil % 0.3 %      Immature Grans % 0.8 (H) %      Neutrophils, Absolute 5.83 10*3/mm3      Lymphocytes, Absolute 2.65 10*3/mm3      Monocytes, Absolute 0.64 10*3/mm3      Eosinophils, Absolute 0.33 10*3/mm3      Basophils, Absolute 0.03 10*3/mm3       Immature Grans, Absolute 0.08 (H) 10*3/mm3     Protime-INR [[id number]]  (Normal) Collected:  03/16/17 1100    Specimen:  Blood Updated:  03/16/17 1134     Protime 13.7 Seconds      INR 1.05     Narrative:       Therapeutic range for most indications is 2.0-3.0 INR,  or 2.5-3.5 for mechanical heart valves.    aPTT [[id number]]  (Normal) Collected:  03/16/17 1100    Specimen:  Blood Updated:  03/16/17 1134     PTT 29.7 seconds     Narrative:       The recommended Heparin therapeutic range is 68-97 seconds.    Comprehensive Metabolic Panel [[id number]]  (Abnormal) Collected:  03/16/17 1100    Specimen:  Blood Updated:  03/16/17 1136     Glucose 217 (H) mg/dL      BUN 22 (H) mg/dL      Creatinine 0.85 mg/dL      Sodium 141 mmol/L      Potassium 4.0 mmol/L      Chloride 99 mmol/L      CO2 29.0 mmol/L      Calcium 8.6 mg/dL      Total Protein 7.0 g/dL      Albumin 3.40 g/dL      ALT (SGPT) 24 U/L      AST (SGOT) 30 U/L      Alkaline Phosphatase 190 (H) U/L      Total Bilirubin 0.5 mg/dL      eGFR Non African Amer 69 mL/min/1.73      Globulin 3.6 (H) gm/dL      A/G Ratio 0.9 (L) g/dL      BUN/Creatinine Ratio 25.9 (H)      Anion Gap 13.0 mmol/L     BNP [[id number]]  (Abnormal) Collected:  03/16/17 1100    Specimen:  Blood Updated:  03/16/17 1148     proBNP 928.0 (H) pg/mL     Troponin [[id number]]  (Normal) Collected:  03/16/17 1100    Specimen:  Blood Updated:  03/16/17 1148     Troponin I 0.033 ng/mL     Influenza Antigen [[id number]]  (Normal) Collected:  03/16/17 1204    Specimen:  Swab from Nasopharynx Updated:  03/16/17 1226     Influenza A Ag, EIA Negative      Influenza B Ag, EIA Negative            Imaging Results (last 24 hours)     Procedure Component Value Units Date/Time    XR Chest 2 View [[id number]] Collected:  03/16/17 1130     Updated:  03/16/17 1137    Narrative:         [facility] [place]    Patient Name: DALE FINK    ORDERING: SMILEY JEFFERS     ATTENDING:    REFERRING: PETER E  ZEYAD    -----------------------    PROCEDURE: Two-view chest    COMPARISON: 12/11/16    HISTORY: Shortness of breath    FINDINGS: Frontal and lateral views of the chest are obtained.     Devices: None    Lungs/Pleura: Lung base opacities, right worse than left,  suspicious for pneumonia or pulmonary edema. Possible small right  pleural effusion.    Cardiomediastinal structures: Heart is enlarged.  Sternal suture  wires appear stable.          Impression:       CONCLUSION:    Lung base opacities, right worse than left, suspicious for  pneumonia or pulmonary edema. Possible small right pleural  effusion.    Electronically signed by:  Umesh Parra MD  3/16/2017 11:36 AM  CDT Workstation: [id number]          I reviewed the patient's new clinical results.  I reviewed the patient's new imaging results and agree with the interpretation.  I reviewed the patient's other test results and agree with the interpretation  I personally viewed and interpreted the patient's EKG/Telemetry data    ASSESSMENT AND PLAN: This is a 57 y.o. female with:    Hospital Problem List     Diabetes mellitus type 2 in obese    Overview Addendum 3/16/2017  7:00 PM by Portillo Argueta MD     Levemir 90 units q 12 hours, novolog SSI             Chronic obstructive pulmonary disease    Overview Signed 3/16/2017  7:04 PM by Portillo Argueta MD     Duo Nebs, Symbicort, oxygen supplementation             Tobacco dependence syndrome    Overview Addendum 3/16/2017  7:04 PM by Portillo Argueta MD     1/2ppd  - Nicotine patch ordered             Sleep apnea    Overview Signed 3/16/2017  7:04 PM by Portillo Argueta MD     CPAP ordered         Peripheral vascular disease    Morbid obesity due to excess calories    Overview Addendum 3/16/2017  7:04 PM by Portillo Argueta MD     BMI 44.5, dietary consult ordered, ADA/heart healthy diet ordered             GERD (gastroesophageal reflux disease)    Overview Signed 3/16/2017  7:05 PM by Portillo Argueta MD     Protonix          Essential hypertension    Overview Signed 3/16/2017  7:05 PM by Portillo Argueta MD     Home lisinopril and metoprolol, will administer IV lasix 80 mg today, 40 mg BID thereafter             Dyslipidemia    Overview Signed 3/16/2017  7:05 PM by Portillo Argueta MD     Home atorvastatin         Asthma        Acute respiratory failure with hypoxia    Overview Signed 3/16/2017  7:06 PM by Portillo Argueta MD     Oxygen supplementation, ABG, duo-nebs, Solumedrol 60 BID ordered.          Community acquired pneumonia    Overview Signed 3/16/2017  7:08 PM by Portillo Argueta MD     Azithromycin 500 mg IV daily, and Ceftriaxone 1 g IV daily ordered, DuoNebs, sputum culture, legionella ag, mycoplasma PCR, pneumococcal ag ordered. CRP and lactic acid ordered upon admission. Incentive spirometry ordered, oxygen supplementation as needed.         Prophylactic hospital isolation    Overview Signed 3/16/2017  7:08 PM by Portillo Argueta MD     History of CRE infection.         CAD    Continue on home Plavix, Metoprolol, atorvastatin.       DVT prophylaxis: Heparin  Code status is Full Code  [facility] # [id number] , reviewed and scanned into medical record.      I discussed the patients findings and my recommendations with patient and family.     Dr. Martinez is the attending on record at time of admission, she is aware of the patient's status and agrees with the above history and physical.        This document has been electronically signed by Portlilo Copeland MD on March 16, 2017 7:11 PM       Electronically signed by Pauline Martinez MD at 3/16/2017  8:13 PM      Pauline Martinez MD at 3/16/2017  7:59 PM          Acute respiratory failure with hypoxia  Subjective:     Yamileth Slater is a 57 y.o. female who presents for  shortness of breath, cough nonproductive, fever 103°F that started yesterday.  She started with fatigue about 4 days ago with a dry hacking cough headache wheezing and sore throat.  She went to her neighbor's house  yesterday to see if she could borrow her oxygen.  She was dyspneic on exertion.  She is using CPAP machine at night.  She does continue to smoke about a half a pack a day of cigarettes.  The following portions of the patient's history were reviewed and updated as appropriate: allergies, current medications, past family history, past medical history, past social history, past surgical history and problem list.    Concurrent Medical Hx:  Past Medical History   Diagnosis Date   • Anxiety states    • Asthma    • Candidiasis of mouth    • Candidiasis of vulva and vagina    • Carotid artery stenosis      DWIGHT 0-15%, LICA 0-15%. LICA stent 5/2014.   • Chest pain    • Chronic folliculitis    • Chronic obstructive lung disease    • Coronary arteriosclerosis    • Diabetes mellitus      no retinopathy; A1C 9.1      • Dyslipidemia    • Dysphagia    • Encounter for general adult medical examination with abnormal findings    • Essential hypertension    • Excoriated eczema      asteosis/keratosis   • Furuncle of neck    • GERD (gastroesophageal reflux disease)    • History of colon polyps    • Hypertensive disorder    • Infection of skin and subcutaneous tissue      rt perineal abscess   • Infestation by Sarcoptes scabiei deepak hominis    • Kidney stone    • Leukocytosis    • Lower limb anomaly      Abscess of lower limb - ANTX causing n/v      • Mixed hyperlipidemia    • Morbid obesity due to excess calories      BMI 44.5   • Need for vaccination    • Neurologic disorder associated with diabetes mellitus    • Non-healing surgical wound      LLE EVHa   • Pain      LLE   • Peripheral vascular disease    • Rash    • Shoulder joint pain    • Sleep apnea    • Surgical follow-up care      5/27/14 L carotid stent   • Tobacco dependence syndrome      1/2ppd      • Type 2 diabetes mellitus    • Viral wart      RIGHT middle phalanx   • Vitamin D deficiency        Past Surgical Hx:  Past Surgical History   Procedure Laterality Date   •  Coronary artery bypass graft  11/15/2013     CABG x 3 with LIMA to LAD and coronary endarterectomy to LAD, SVG to Ramus intermedius branch and SVG to PDA.    • Cardiac catheterization  08/09/2013     Severe multivessel CAD with critical lesions noted in the RCA, obtuse marginal two, ramus intermemdious, and LAD as described above. Normal LV systolic function with no wall motion abnormality.    • Cardiac catheterization  05/27/2014      LEFT Carotid Stent    • Colonoscopy  05/02/2016     Normal colon.Diverticulosis in the sigmoid colon.No specimens collected.    • Other surgical history  01/25/2016     DESTRUCTION OF BENIGN LESION      • Endoscopy  09/23/2015      Esophageal stricture present.Normal stomach.Normal duodenum.    • Endoscopy  11/16/2015     w/ dilatation - Esophageal stricture present,Dilatation performed.Normal stomach and duodenum.    • Other surgical history  04/18/2014     ear/neck - L attempted CEA, Neck Dissection    • Incision and drainage abscess  01/02/2016     Incision and drainage of right perineal abscess.    • Incision and drainage abscess  02/18/2014     Incision and Drainage of abscess of left lower leg      Family Hx:  Family History   Problem Relation Age of Onset   • Coronary artery disease Other    • Diabetes Other    • Heart disease Other    • Hypertension Other       Social History:  Social History     Social History   • Marital status:      Spouse name: N/A   • Number of children: N/A   • Years of education: N/A     Occupational History   • Not on file.     Social History Main Topics   • Smoking status: Current Every Day Smoker     Types: Cigarettes   • Smokeless tobacco: Not on file   • Alcohol use No   • Drug use: No   • Sexual activity: Not on file     Other Topics Concern   • Not on file     Social History Narrative       Home Medications:  No current facility-administered medications on file prior to encounter.      Current Outpatient Prescriptions on File Prior to  Encounter   Medication Sig Dispense Refill   • atorvastatin (LIPITOR) 20 MG tablet TAKE 1 TABLET BY MOUTH DAILY. 30 tablet 3   • Blood Glucose Monitoring Suppl ([employer] BLOOD GLUCOSE METER) W/DEVICE kit 1 each 3 (Three) Times a Day. 1 kit 3   • budesonide-formoterol (SYMBICORT) 160-4.5 MCG/ACT inhaler Inhale 2 puffs 2 (Two) Times a Day. 1 inhaler 6   • cetirizine (zyrTEC) 10 MG tablet Take 1 tablet by mouth Daily. 30 tablet 3   • chlorhexidine (HIBICLENS) 4 % external liquid Apply  topically Daily. 118 mL 0   • clobetasol (CLOBEX) 0.05 % lotion apply to skin bid x 1-2w then prn. generic 118 g 3   • clopidogrel (PLAVIX) 75 MG tablet TAKE 1 TABLET BY MOUTH DAILY. 30 tablet 3   • cyclobenzaprine (FLEXERIL) 10 MG tablet Take 1 tablet by mouth 2 (Two) Times a Day. 60 tablet 2   • furosemide (LASIX) 40 MG tablet Take 1 tablet by mouth Daily. 30 tablet 3   • gabapentin (NEURONTIN) 800 MG tablet Take 1 tablet by mouth 3 (Three) Times a Day. 90 tablet 3   • glucose blood test strip 1 each by Other route 4 (Four) Times a Day. Use as instructed 100 each 12   • insulin glargine (LANTUS) 100 UNIT/ML injection Inject 90 Units under the skin Every 12 (Twelve) Hours. 1 each 11   • Insulin Lispro (HUMALOG KWIKPEN) 100 UNIT/ML solution pen-injector Inject 45 Units under the skin 3 (Three) Times a Day Before Meals. 60 mL 11   • Insulin Pen Needle (NOVOFINE) 30G X 8 MM misc As directed 4 times daily 100 each 11   • lisinopril (PRINIVIL,ZESTRIL) 10 MG tablet TAKE 1 TABLET BY MOUTH DAILY. 30 tablet 3   • metFORMIN (GLUMETZA) 1000 MG (MOD) 24 hr tablet   3   • metFORMIN (GLUMETZA) 1000 MG (MOD) 24 hr tablet TAKE 1 TABLET BY MOUTH 2 (TWO) TIMES A DAY WITH MEALS. 60 tablet 3   • metoprolol tartrate (LOPRESSOR) 50 MG tablet Take 1 tablet by mouth 2 (Two) Times a Day. 60 tablet 3   • naproxen (NAPROSYN) 500 MG tablet Take 1 tablet by mouth 2 (Two) Times a Day With Meals. 60 tablet 3   • nitroglycerin (NITROSTAT) 0.4 MG SL tablet Place 1 tablet  "under the tongue As Needed for chest pain. Take no more than 3 doses in 15 minutes. 30 tablet 3   • omeprazole-sodium bicarbonate (ZEGERID)  MG per capsule   3   • pantoprazole (PROTONIX) 40 MG EC tablet TAKE 1 TABLET BY MOUTH DAILY. 30 tablet 3   • permethrin (ELIMITE) 5 % cream Apply  topically. Massage into skin from head to feet, remove 8-14 hours later with water     • potassium chloride (K-DUR) 10 MEQ CR tablet TAKE 1 TABLET BY MOUTH EVERY NIGHT. 30 tablet 3   • promethazine (PHENERGAN) 25 MG tablet Take 25 mg by mouth every 6 (six) hours as needed for nausea or vomiting.         Allergies:  Clindamycin/lincomycin and Other    Review of Systems  Review of Systems   Constitutional: Positive for activity change and fever. Negative for appetite change and fatigue.   HENT: Positive for sore throat. Negative for ear pain, sinus pressure and sneezing.    Eyes: Negative for pain, discharge, itching and visual disturbance.   Respiratory: Positive for cough and shortness of breath.    Cardiovascular: Negative for chest pain and palpitations.   Gastrointestinal: Negative for abdominal pain, constipation, diarrhea and nausea.   Endocrine: Negative for cold intolerance, heat intolerance, polydipsia, polyphagia and polyuria.   Genitourinary: Negative for difficulty urinating, dysuria, frequency and urgency.   Musculoskeletal: Negative for arthralgias and gait problem.   Skin: Positive for rash. Negative for color change.   Neurological: Negative for dizziness, weakness and headaches.   Hematological: Negative for adenopathy. Does not bruise/bleed easily.   Psychiatric/Behavioral: Positive for sleep disturbance. Negative for agitation and confusion.       Objective:     Visit Vitals   • /72 (BP Location: Right arm, Patient Position: Sitting)   • Pulse 96   • Temp 98.2 °F (36.8 °C) (Axillary)   • Resp 20   • Ht 62\" (157.5 cm)   • Wt 280 lb (127 kg)   • SpO2 94%   • BMI 51.21 kg/m2     Physical Exam "   Constitutional: She is oriented to person, place, and time. She appears well-developed and well-nourished.   HENT:   Head: Normocephalic and atraumatic.   Right Ear: Hearing and external ear normal.   Left Ear: Hearing and external ear normal.   Nose: Nose normal.   Mouth/Throat: Oropharynx is clear and moist.   Eyes: Conjunctivae and EOM are normal. Pupils are equal, round, and reactive to light.   Neck: Normal range of motion. Neck supple.   Cardiovascular: Normal rate, regular rhythm, normal heart sounds and intact distal pulses.    Pulmonary/Chest: Effort normal. She has rales (bibasilar).   Decreased breath sounds bilaterally   Abdominal: Soft. Bowel sounds are normal. She exhibits no distension. There is no tenderness. There is no rebound and no guarding.   Musculoskeletal: Normal range of motion. She exhibits no edema.   Neurological: She is alert and oriented to person, place, and time.   Skin: Skin is warm and dry.   Multiple macular lesions on face no purulent drainage.    Psychiatric: She has a normal mood and affect. Her speech is normal and behavior is normal. Judgment and thought content normal. Cognition and memory are normal.     I reviewed the patient's new clinical results.  I reviewed the patient's new imaging results.  Assessment/Plan:     Active Hospital Problems (** Indicates Principal Problem)    Diagnosis Date Noted   • **Acute respiratory failure with hypoxia [J96.01] 03/16/2017     Oxygen supplementation, ABG, duo-nebs, Solumedrol 60 BID ordered.      • Community acquired pneumonia [J18.9] 03/16/2017     Azithromycin 500 mg IV daily, and Ceftriaxone 1 g IV daily ordered, DuoNebs, sputum culture, legionella ag, mycoplasma PCR, pneumococcal ag ordered. CRP and lactic acid ordered upon admission. Incentive spirometry ordered, oxygen supplementation as needed.     • Prophylactic hospital isolation [Z41.8] 03/16/2017     History of CRE infection.     • Pneumonia of both lungs due to infectious  organism [J18.9] 2017   • Diabetes mellitus type 2 in obese [E11.9, E66.9] 2016     Levemir 90 units q 12 hours, novolog SSI     • Chronic obstructive pulmonary disease [J44.9] 2016     Duo Nebs, Symbicort, oxygen supplementation     • Tobacco dependence syndrome [F17.200]      1/2ppd  - Nicotine patch ordered     • Sleep apnea [G47.30]      CPAP ordered     • Dyslipidemia [E78.5]      Home atorvastatin     • Morbid obesity due to excess calories [E66.01]      BMI 44.5, dietary consult ordered     • Essential hypertension [I10]      Home lisinopril and metoprolol, will administer IV lasix 80 mg today, 40 mg BID thereafter     • Asthma [J45.909]    • GERD (gastroesophageal reflux disease) [K21.9]      Protonix     • Peripheral vascular disease [I73.9]    • Coronary arteriosclerosis [I25.10]      S/p CABG X3 in , continue home Plavix, Metoprolol, and Atorvastatin        Resolved Hospital Problems    Diagnosis Date Noted Date Resolved   No resolved problems to display.         I have seen and examined the patient.  I have reviewed the notes, assessments, and/or procedures performed by Dr. Raymundo, I concur with her/his documentation and assessment and plan for Yamileth Slater.        This document has been electronically signed by Pauline Martinez MD on 2017 8:12 PM          Electronically signed by Pauline Martinez MD at 3/16/2017  8:12 PM           Physician Progress Notes (last 24 hours) (Notes from 3/20/2017 10:14 AM through 3/21/2017 10:14 AM)      Charlette Bowden MD at 3/20/2017 10:16 AM  Version 1 of 1         FAMILY MEDICINE PROGRESS NOTE  NAME: Yamileth Slater  : 1960  MRN: [id number]     LOS: 4 days   Full Code  PROVIDER OF SERVICE:     Chief Complaint:  Pneumonia of both lungs due to methicillin resistant Staphylococcus aureus (MRSA)    Subjective     Interval History:  History taken from: patient  Subjective: Patient is a 56 yo  female who was  admitted for sepsis secondary to pneumonia.  Patient also has laryngitis.  She is feeling little better the last 2 days.    Review of Systems:   Review of Systems   Constitutional: Negative for activity change, appetite change, chills and fever.   HENT: Positive for sore throat and voice change. Negative for congestion and trouble swallowing.    Eyes: Negative for photophobia and visual disturbance.   Respiratory: Positive for cough, shortness of breath (much improved since admission) and wheezing.    Cardiovascular: Positive for leg swelling. Negative for chest pain and palpitations.   Gastrointestinal: Negative for abdominal pain, constipation, diarrhea, nausea and vomiting.   Endocrine: Negative for polydipsia and polyphagia.   Genitourinary: Negative for difficulty urinating, dysuria and frequency.   Musculoskeletal: Negative for arthralgias and gait problem.   Skin: Positive for wound (complains of abscess in groin). Negative for color change, pallor and rash.   Neurological: Negative for weakness, light-headedness and headaches.   Psychiatric/Behavioral: Negative for sleep disturbance. The patient is not nervous/anxious.        Objective     Vital Signs  Temp:  [96.2 °F (35.7 °C)-98.3 °F (36.8 °C)] 97.6 °F (36.4 °C)  Heart Rate:  [64-93] 66  Resp:  [18-20] 20  BP: (119-174)/(56-82) 119/56    Physical Exam  Physical Exam   Constitutional: She is oriented to person, place, and time. She appears well-developed and well-nourished. No distress.   HENT:   Voice hoarse   Cardiovascular: Normal rate, regular rhythm and intact distal pulses.  Exam reveals no gallop and no friction rub.    Murmur heard.  Pulmonary/Chest: Effort normal. No respiratory distress. She has wheezes. She has no rales.   Diminished breath sounds in the bases   Abdominal: Soft. Bowel sounds are normal. She exhibits no distension. There is no tenderness. There is no rebound and no guarding.   Musculoskeletal: She exhibits edema.   Neurological:  She is alert and oriented to person, place, and time.   Skin: She is not diaphoretic.   Psychiatric: She has a normal mood and affect. Her behavior is normal. Judgment and thought content normal. note and vitals reviewed.      Medication Review    Current Facility-Administered Medications:   •  acetaminophen (TYLENOL) tablet 650 mg, 650 mg, Oral, Q4H PRN, Portillo Argueta MD  •  atorvastatin (LIPITOR) tablet 20 mg, 20 mg, Oral, Daily, Portillo Argueta MD, 20 mg at 03/20/17 0922  •  bisacodyl (DULCOLAX) EC tablet 5 mg, 5 mg, Oral, Daily PRN, Portillo Argueta MD  •  bisacodyl (DULCOLAX) suppository 10 mg, 10 mg, Rectal, Daily PRN, Portillo Argueta MD  •  budesonide-formoterol (SYMBICORT) 160-4.5 MCG/ACT inhaler 2 puff, 2 puff, Inhalation, BID - RT, Portillo Argueta MD, 2 puff at 03/20/17 1909  •  cetirizine (zyrTEC) tablet 10 mg, 10 mg, Oral, Daily, Portillo Argueta MD, 10 mg at 03/20/17 0922  •  clopidogrel (PLAVIX) tablet 75 mg, 75 mg, Oral, Daily, Portillo Argueta MD, 75 mg at 03/20/17 0921  •  cyclobenzaprine (FLEXERIL) tablet 10 mg, 10 mg, Oral, BID, Portillo Argueta MD, 10 mg at 03/20/17 1735  •  dextrose (D50W) solution 25 g, 25 g, Intravenous, Q15 Min PRN, Portillo Argueta MD  •  dextrose (GLUTOSE) oral gel 15 g, 15 g, Oral, Q15 Min PRN, Portillo Argueta MD  •  enoxaparin (LOVENOX) syringe 40 mg, 40 mg, Subcutaneous, Q24H, Yadira Delarosa MD, 40 mg at 03/20/17 0921  •  furosemide (LASIX) tablet 40 mg, 40 mg, Oral, Daily, Alban Vázquez MD, 40 mg at 03/20/17 0922  •  gabapentin (NEURONTIN) capsule 800 mg, 800 mg, Oral, Q8H, Portillo Argueta MD, 800 mg at 03/20/17 2910  •  glucagon (human recombinant) (GLUCAGEN DIAGNOSTIC) injection 1 mg, 1 mg, Subcutaneous, Q15 Min PRN, Portillo Argueta MD  •  insulin aspart (novoLOG) injection 0-24 Units, 0-24 Units, Subcutaneous, 4x Daily AC & at Bedtime, Portillo Argueta MD, 4 Units at 03/20/17 2029  •  insulin aspart (novoLOG) injection 30 Units, 30 Units, Subcutaneous, TID With Meals, Alban  MD Kathie, 30 Units at 03/20/17 1753  •  insulin detemir (LEVEMIR) injection 95 Units, 95 Units, Subcutaneous, Nightly, Yadira Delarosa MD, 95 Units at 03/20/17 2155  •  insulin detemir (LEVEMIR) injection 95 Units, 95 Units, Subcutaneous, QAM, Yadira Delarosa MD, 95 Units at 03/20/17 0714  •  ipratropium-albuterol (DUO-NEB) nebulizer solution 3 mL, 3 mL, Nebulization, 4x Daily - RT, Portillo Argueta MD, 3 mL at 03/20/17 1902  •  lisinopril (PRINIVIL,ZESTRIL) tablet 10 mg, 10 mg, Oral, Q24H, Portillo Argueta MD, 10 mg at 03/20/17 0922  •  metoprolol tartrate (LOPRESSOR) tablet 50 mg, 50 mg, Oral, BID, Portillo Argueta MD, 50 mg at 03/20/17 1735  •  nicotine (NICODERM CQ) 14 MG/24HR patch 1 patch, 1 patch, Transdermal, Q24H, Portillo Argueta MD, 1 patch at 03/20/17 2155  •  nitroglycerin (NITROSTAT) SL tablet 0.4 mg, 0.4 mg, Sublingual, PRN, Portillo Argueta MD  •  ondansetron (ZOFRAN) injection 4 mg, 4 mg, Intravenous, Q6H PRN, Portillo Argueta MD  •  pantoprazole (PROTONIX) injection 40 mg, 40 mg, Intravenous, Q AM, Portillo Argueta MD, 40 mg at 03/20/17 0535  •  Pharmacy to dose vancomycin, , Does not apply, Continuous PRN, Alban Vázquez MD  •  potassium chloride (K-DUR) CR tablet 10 mEq, 10 mEq, Oral, Daily, Portillo Argueta MD, 10 mEq at 03/20/17 0925  •  predniSONE (DELTASONE) tablet 40 mg, 40 mg, Oral, Q12H, Alban Vázquez MD, 40 mg at 03/20/17 2155  •  promethazine (PHENERGAN) tablet 25 mg, 25 mg, Oral, Q6H PRN, Portillo Argueta MD  •  sodium chloride 0.9 % flush 1-10 mL, 1-10 mL, Intravenous, PRN, Portillo Argueta MD, 10 mL at 03/16/17 2216  •  vancomycin (VANCOCIN) 2,000 mg in sodium chloride 0.9 % 500 mL IVPB, 15 mg/kg, Intravenous, Q18H, Alban Vázquez MD, Last Rate: 0 mL/hr at 03/20/17 0130, 2,000 mg at 03/20/17 1736     Diagnostic Data      I reviewed the patient's new clinical results and imaging.      Assessment&#47;Plan     Active Hospital Problems (** Indicates Principal Problem)    Diagnosis Date Noted   •  **Pneumonia of both lungs due to methicillin resistant Staphylococcus aureus (MRSA) [J15.212] 03/16/2017     - Ceftriaxone 1 g IV daily day 4. Vancomycin day 3 for MRSA coverage, [place]  - Sputum culture -show MRSA  - CRP 28.30, trending down today is 1.5.   - Incentive spirometry, supplemental oxygen PRN    - Will discharge home on Bactrim     • Laryngitis, acute [J04.0] 03/17/2017   • Acute respiratory failure with hypoxia [J96.01] 03/16/2017     - Oxygen supplementation - wean to SpO2 > 90%   - prednisone 40mg BID   - Ambulate pt today without O2, document sats  - Duo-nebs       • Prophylactic hospital isolation [Z41.8] 03/16/2017     History of CRE infection continue contact precautions     • Diabetes mellitus type 2 in obese [E11.9, E66.9] 08/24/2016     - Increase night time Levemir to 95u qhs;  Levemir 90 units qAM, novolog SSI  -will add meal time insulin which she takes at home.   -blood sugar is better control today.          • Chronic obstructive pulmonary disease [J44.9] 08/24/2016     - Duo Nebs, Symbicort, oxygen supplementation, steroid     • Tobacco dependence syndrome [F17.200]      1/2ppd  - Nicotine patch ordered     • Sleep apnea [G47.30]      CPAP ordered  - 3/18/17 desaturation to 84% on CPAP, will do overnight pulse oximetry to qualify pt for home O2     • Dyslipidemia [E78.5]      Home atorvastatin     • Morbid obesity due to excess calories [E66.01]      BMI 44.5, dietary consult ordered     • Essential hypertension [I10]      Home lisinopril and metoprolol, will administer IV lasix 80 mg today, 40 mg BID thereafter     • Asthma [J45.909]    • GERD (gastroesophageal reflux disease) [K21.9]      Protonix     • Peripheral vascular disease [I73.9]    • Coronary arteriosclerosis [I25.10]      S/p CABG X3 in 2013, continue home Plavix, Metoprolol, and Atorvastatin        Resolved Hospital Problems    Diagnosis Date Noted Date Resolved   • Sepsis due to pneumonia [J18.9, A41.9] 03/17/2017  "2017     -Ceftriaxone 1 g IV daily, plus vancomycin DuoNebs  -  MRSA PNA  - Legionella ag - negative, pneumococcal ag - negative.  - Lactic acid 3.1 on admission, repeat 2.0; now 1.2  - CRP 28.30 on admission now 1.5  - Incentive spirometry, supplemental oxygen PRN       I have reviewed the notes, assessments, and/or procedures performed by Dr. Delarosa, I concur with her documentation of Yamileth Slater.      DVT prophylaxis: Heparin      Disposition:Home with home health          This document has been electronically signed by Charlette Bowden MD on 2017 11:17 PM          This document has been electronically signed by Charlette Bowden MD on 2017 11:17 PM           Electronically signed by Charlette Bowden MD at 3/20/2017 11:19 PM      Yadira Delarosa MD at 3/21/2017  7:47 AM  Version 1 of 1         FAMILY MEDICINE DAILY PROGRESS NOTE  NAME: Yamileth Slater  : 1960  MRN: [id number]     LOS: 5 days     PROVIDER OF SERVICE: Yadira Delarosa MD    Chief Complaint: Pneumonia of both lungs due to methicillin resistant Staphylococcus aureus (MRSA)    Subjective:     Interval History:  History taken from: patient  Patient admitted with acute hypoxic respiratory failure and sepsis d.t. MRSA pna, states her breathing is much improved, she still has cough.  Overnight pulse oximetry revealed desaturations to 79% on 2LNC. Spoke with pt regarding clindamycin \"allergy\", reported reaction is vaginal yeast infection, patient has tolerated this medication multiple times in the past, counseled that yeast infection is not an allergy, pt ok with discharge home on clindamycin.     Review of Systems:   Review of Systems   Constitutional: Negative for activity change, appetite change, chills and fever.   HENT: Negative for congestion and trouble swallowing.    Eyes: Negative for photophobia and visual disturbance.   Respiratory: Positive for cough, and wheezing.    Cardiovascular: " Positive for leg swelling. Negative for chest pain and palpitations.   Gastrointestinal: Negative for abdominal pain, constipation, diarrhea, nausea and vomiting.   Endocrine: Negative for polydipsia and polyphagia.   Genitourinary: Negative for difficulty urinating, dysuria and frequency.   Musculoskeletal: Negative for arthralgias and gait problem.   Skin: Negative for color change, pallor and rash.   Neurological: Negative for weakness, light-headedness and headaches.   Psychiatric/Behavioral: Negative for sleep disturbance. The patient is not nervous/anxious.        Objective:     Vital Signs  Temp:  [96.7 °F (35.9 °C)-98.5 °F (36.9 °C)] 98 °F (36.7 °C)  Heart Rate:  [62-93] 87  Resp:  [18-20] 20  BP: (119-169)/(56-80) 140/65  Body mass index is 51.2 kg/(m^2).     Physical Exam  Physical Exam   Constitutional: She is oriented to person, place, and time. She appears well-developed and well-nourished. She is active and cooperative. No distress.   Obese   HEENT: NCAT, EOMI, PERRL   Cardiovascular: Normal rate, regular rhythm, normal heart sounds and intact distal pulses.    Pulmonary/Chest: Effort normal. No respiratory distress. She has decreased breath sounds. She has wheezes (throughout).    Abdominal: Soft. Bowel sounds are normal. She exhibits no distension. There is no tenderness.   Genitourinary: There is no rash, tenderness or lesion on the right labia. There is no rash, tenderness or lesion on the left labia.   Genitourinary Comments: No abscess in groin   Musculoskeletal: Normal range of motion. She exhibits edema (1+ pitting bilateral lower ext).   Neurological: She is alert and oriented to person, place, and time.   Skin: Skin is warm and dry.   Multiple excoriations on face, arms, abdomen, legs without evidence of infection.   Vitals reviewed.      Medication Review    Current Facility-Administered Medications:   •  acetaminophen (TYLENOL) tablet 650 mg, 650 mg, Oral, Q4H PRN, Portillo Argueta MD  •   atorvastatin (LIPITOR) tablet 20 mg, 20 mg, Oral, Daily, Portillo Argueta MD, 20 mg at 03/20/17 0922  •  bisacodyl (DULCOLAX) EC tablet 5 mg, 5 mg, Oral, Daily PRN, Portillo Argueta MD  •  bisacodyl (DULCOLAX) suppository 10 mg, 10 mg, Rectal, Daily PRN, Portillo Argueta MD  •  budesonide-formoterol (SYMBICORT) 160-4.5 MCG/ACT inhaler 2 puff, 2 puff, Inhalation, BID - RT, Portillo Argueta MD, 2 puff at 03/20/17 1909  •  cetirizine (zyrTEC) tablet 10 mg, 10 mg, Oral, Daily, Portillo Argueta MD, 10 mg at 03/20/17 0922  •  clopidogrel (PLAVIX) tablet 75 mg, 75 mg, Oral, Daily, Portillo Argueta MD, 75 mg at 03/20/17 0921  •  cyclobenzaprine (FLEXERIL) tablet 10 mg, 10 mg, Oral, BID, Portillo Argueta MD, 10 mg at 03/20/17 1735  •  dextrose (D50W) solution 25 g, 25 g, Intravenous, Q15 Min PRN, Portillo Argueta MD  •  dextrose (GLUTOSE) oral gel 15 g, 15 g, Oral, Q15 Min PRN, Portillo Argueta MD  •  enoxaparin (LOVENOX) syringe 40 mg, 40 mg, Subcutaneous, Q24H, Yadira Delarosa MD, 40 mg at 03/20/17 0921  •  furosemide (LASIX) tablet 40 mg, 40 mg, Oral, Daily, Alban Vázquez MD, 40 mg at 03/20/17 0922  •  gabapentin (NEURONTIN) capsule 800 mg, 800 mg, Oral, Q8H, Portillo Argueta MD, 800 mg at 03/21/17 0545  •  glucagon (human recombinant) (GLUCAGEN DIAGNOSTIC) injection 1 mg, 1 mg, Subcutaneous, Q15 Min PRN, Portillo Argueta MD  •  insulin aspart (novoLOG) injection 0-24 Units, 0-24 Units, Subcutaneous, 4x Daily AC & at Bedtime, Portillo Argueta MD, 4 Units at 03/20/17 1753  •  insulin aspart (novoLOG) injection 30 Units, 30 Units, Subcutaneous, TID With Meals, Alban Vázquez MD, 30 Units at 03/20/17 1753  •  insulin detemir (LEVEMIR) injection 95 Units, 95 Units, Subcutaneous, Nightly, Yadira Delarosa MD, 95 Units at 03/20/17 2158  •  insulin detemir (LEVEMIR) injection 95 Units, 95 Units, Subcutaneous, QAM, Yadira Delarosa MD, 95 Units at 03/21/17 9613  •  ipratropium-albuterol (DUO-NEB) nebulizer solution 3 mL, 3 mL,  Nebulization, 4x Daily - RT, Portillo Argueta MD, 3 mL at 03/20/17 1902  •  lisinopril (PRINIVIL,ZESTRIL) tablet 10 mg, 10 mg, Oral, Q24H, Portillo Argueta MD, 10 mg at 03/20/17 0922  •  metoprolol tartrate (LOPRESSOR) tablet 50 mg, 50 mg, Oral, BID, Portillo Argueta MD, 50 mg at 03/20/17 1735  •  nicotine (NICODERM CQ) 14 MG/24HR patch 1 patch, 1 patch, Transdermal, Q24H, Portillo Argueta MD, 1 patch at 03/20/17 2155  •  nitroglycerin (NITROSTAT) SL tablet 0.4 mg, 0.4 mg, Sublingual, PRN, Portillo Argueta MD  •  ondansetron (ZOFRAN) injection 4 mg, 4 mg, Intravenous, Q6H PRN, Portillo Argueta MD  •  pantoprazole (PROTONIX) injection 40 mg, 40 mg, Intravenous, Q AM, Portillo Argueta MD, 40 mg at 03/21/17 0545  •  Pharmacy to dose vancomycin, , Does not apply, Continuous PRN, Alban Vázquez MD  •  potassium chloride (K-DUR) CR tablet 10 mEq, 10 mEq, Oral, Daily, Portillo Argueta MD, 10 mEq at 03/20/17 0925  •  predniSONE (DELTASONE) tablet 40 mg, 40 mg, Oral, Q12H, Alban Vázquez MD, 40 mg at 03/20/17 2155  •  promethazine (PHENERGAN) tablet 25 mg, 25 mg, Oral, Q6H PRN, Portillo Argueta MD  •  sodium chloride 0.9 % flush 1-10 mL, 1-10 mL, Intravenous, PRN, Portillo Argueta MD, 10 mL at 03/16/17 2216  •  vancomycin (VANCOCIN) 2,000 mg in sodium chloride 0.9 % 500 mL IVPB, 15 mg/kg, Intravenous, Q18H, Alban Vázquez MD, Last Rate: 0 mL/hr at 03/20/17 0130, 2,000 mg at 03/20/17 1736     Diagnostic Data    Lab Results (last 24 hours)     Procedure Component Value Units Date/Time    POC Glucose Fingerstick [[id number]]  (Abnormal) Collected:  03/19/17 1654    Specimen:  Blood Updated:  03/20/17 0754     Glucose 299 (H) mg/dL       Sliding Scale AdminRN NotifiedMeter: [id number]: [id number] FIELDS        POC Glucose Fingerstick [[id number]]  (Abnormal) Collected:  03/20/17 0646    Specimen:  Blood Updated:  03/20/17 0755     Glucose 260 (H) mg/dL       RN NotifiedMeter: [id number]: [id number] PAGE TAMMIE       Vancomycin, Trough  [[id number]]  (Normal) Collected:  03/20/17 1505    Specimen:  Blood Updated:  03/20/17 1538     Vancomycin Trough 13.25 mcg/mL     Blood Culture [[id number]]  (Normal) Collected:  03/16/17 2000    Specimen:  Blood from Hand, Right Updated:  03/20/17 2101     Blood Culture No growth at 4 days     Blood Culture [[id number]]  (Normal) Collected:  03/16/17 1950    Specimen:  Blood from Arm, Right Updated:  03/20/17 2101     Blood Culture No growth at 4 days     POC Glucose Fingerstick [[id number]]  (Abnormal) Collected:  03/20/17 1111    Specimen:  Blood Updated:  03/20/17 2154     Glucose 301 (H) mg/dL       Meter: [id number]: [id number] YANICK TURNERICA       POC Glucose Fingerstick [[id number]]  (Abnormal) Collected:  03/20/17 2154    Specimen:  Blood Updated:  03/20/17 2237     Glucose 193 (H) mg/dL       Sliding Scale AdminMeter: [id number]: [id number] [facility]       POC Glucose Fingerstick [[id number]]  (Abnormal) Collected:  03/21/17 0544    Specimen:  Blood Updated:  03/21/17 0604     Glucose 188 (H) mg/dL       Sliding Scale AdminMeter: [id number]: [id number] DARIEN ZINA       CBC & Differential [[id number]] Collected:  03/21/17 0545    Specimen:  Blood Updated:  03/21/17 0629    Narrative:       The following orders were created for panel order CBC & Differential.  Procedure                               Abnormality         Status                     ---------                               -----------         ------                     CBC Auto Differential[[id number]]         Abnormal            Final result                 Please view results for these tests on the individual orders.    CBC Auto Differential [[id number]]  (Abnormal) Collected:  03/21/17 0545    Specimen:  Blood Updated:  03/21/17 0629     WBC 10.30 (H) 10*3/mm3      RBC 4.28 10*6/mm3      Hemoglobin 10.0 (L) g/dL      Hematocrit 32.4 (L) %      MCV 75.7 (L) fL      MCH 23.4 (L) pg      MCHC 30.9 (L) g/dL      RDW 18.0 (H)  %      RDW-SD 48.8 (H) fl      MPV 9.1 fL      Platelets 371 10*3/mm3      Neutrophil % 73.3 %      Lymphocyte % 19.4 %      Monocyte % 3.6 %      Eosinophil % 0.1 %      Basophil % 0.2 %      Immature Grans % 3.4 (H) %      Neutrophils, Absolute 7.55 10*3/mm3      Lymphocytes, Absolute 2.00 10*3/mm3      Monocytes, Absolute 0.37 10*3/mm3      Eosinophils, Absolute 0.01 10*3/mm3      Basophils, Absolute 0.02 10*3/mm3      Immature Grans, Absolute 0.35 (H) 10*3/mm3     Comprehensive Metabolic Panel [[id number]]  (Abnormal) Collected:  03/21/17 0545    Specimen:  Blood Updated:  03/21/17 0653     Glucose 182 (H) mg/dL      BUN 26 (H) mg/dL      Creatinine 0.87 mg/dL      Sodium 141 mmol/L      Potassium 5.2 (H) mmol/L      Chloride 101 mmol/L      CO2 29.0 mmol/L      Calcium 9.1 mg/dL      Total Protein 7.1 g/dL      Albumin 3.60 g/dL      ALT (SGPT) 29 U/L      AST (SGOT) 60 (H) U/L      Alkaline Phosphatase 114 U/L      Total Bilirubin 0.3 mg/dL      eGFR Non African Amer 67 mL/min/1.73      Globulin 3.5 gm/dL      A/G Ratio 1.0 (L) g/dL      BUN/Creatinine Ratio 29.9 (H)      Anion Gap 11.0 mmol/L         I reviewed the patient's new clinical results.    Assessment/Plan:     Active Hospital Problems (** Indicates Principal Problem)    Diagnosis Date Noted   • **Pneumonia of both lungs due to methicillin resistant Staphylococcus aureus (MRSA) [J15.212] 03/16/2017     Priority: Medium     - previously on Vancomycin (3 days) for MRSA coverage, switch to PO clindamycin  -  DuoNebs  - Sputum culture -show MRSA  - CRP 28.30, trending down today is 1.5.   - Incentive spirometry, supplemental oxygen PRN    - Will discharge home on Clindamycin today     • Acute respiratory failure with hypoxia [J96.01] 03/16/2017     Priority: High     - Oxygen supplementation - wean to SpO2 > 90%   - prednisone 40mg BID   - Ambulate pt today without O2, document sats  - Duo-nebs       • Laryngitis, acute [J04.0] 03/17/2017   • Prophylactic  hospital isolation [Z41.8] 03/16/2017     History of CRE infection continue contact precautions     • Diabetes mellitus type 2 in obese [E11.9, E66.9] 08/24/2016     - Levemir to 95u qhs;  Levemir 95 units qAM, novolog SSI  - Home meal time insulin       • Chronic obstructive pulmonary disease [J44.9] 08/24/2016     - Duo Nebs, Symbicort, oxygen supplementation, steroid     • Tobacco dependence syndrome [F17.200]      1/2ppd  - Nicotine patch ordered     • Sleep apnea [G47.30]      CPAP ordered  - 3/18/17 desaturation to 84% on CPAP  - overnight pulse oximetry: multiple desaturations, lowest SpO2 79% on 2LNC on 3/20/17  - case management consult for home O2     • Dyslipidemia [E78.5]      Home atorvastatin     • Morbid obesity due to excess calories [E66.01]      BMI 44.5, dietary consult ordered     • Essential hypertension [I10]      Home lisinopril and metoprolol, will administer IV lasix 80 mg today, 40 mg BID thereafter     • Asthma [J45.909]    • GERD (gastroesophageal reflux disease) [K21.9]      Protonix     • Peripheral vascular disease [I73.9]    • Coronary arteriosclerosis [I25.10]      S/p CABG X3 in 2013, continue home Plavix, Metoprolol, and Atorvastatin        Resolved Hospital Problems    Diagnosis Date Noted Date Resolved   • Sepsis due to pneumonia [J18.9, A41.9] 03/17/2017 03/20/2017     Priority: High     -Ceftriaxone 1 g IV daily, plus vancomycin DuoNebs  -  MRSA PNA  - Legionella ag - negative, pneumococcal ag - negative.  - Lactic acid 3.1 on admission, repeat 2.0; now 1.2  - CRP 28.30 on admission now 1.5  - Incentive spirometry, supplemental oxygen PRN       DVT prophylaxis: Lovenox  Code status is Full Code    Plan for disposition:Home with home health sepsis protocol & respiratory assessment today      Signature  Yadira Delarosa MD PGY2  [place] [address]  Office: [phone]    This document has been  electronically signed by Leon Delarosa MD on 2017 7:47 AM         Electronically signed by Leon Delarosa MD at 3/21/2017  7:55 AM          [address] [address]  Phone: [phone]  Fax:  Date: Mar 21, 2017      Ambulatory Referral to Home Health     Patient: Yamileth Slater MRN: [id number]   [address]  [place] [address] : 1960  SSN:    Phone: [phone] Sex: F      INSURANCE PA[employer]AN [employer] # SUBSCRIBER ID   Primary: MEDICARE [id number]   [id number]      Referring Provider Information:  LEON DELAROSA Phone: [phone] Fax:       Referral Information:   # Visits: 1 Referral Type: Home Health [42]   Urgency: Routine Referral Reason: Specialty Services Required   Start Date: Mar 21, 2017 End Date: To be determined by Insurer   Diagnosis: Acute respiratory failure with hypoxia (J96.01 [ICD-10-CM] 518.81 [ICD-9-CM])  MIKE on CPAP (G47.33 [ICD-10-CM] 327.23 [ICD-9-CM])  Chronic obstructive pulmonary disease with acute lower respiratory infection (J44.0 [ICD-10-CM] 496,519.8 [ICD-9-CM])  Sepsis due to pneumonia (J18.9,A41.9 [ICD-10-CM] 486,995.91 [ICD-9-CM])      Refer to Dept: [facility]  Refer to Provider:   Refer to Facility:      Face to Face Visit Date: 3/21/2017  Follow-up Provider for Plan of Care? I will be treating the patient on an ongoing basis. Please send me the Plan of Care for signature.  Follow-up Provider: LEON DELAROSA [[id number]]  Reason/Clinical Findings: Sepsis protocol; respiratory assessments; home O2 with CPAP  Describe mobility limitations that make leaving home difficult: unknown  Nursing/Therapeutic Services Requested: Skilled Nursing  Skilled nursing orders: O2 instruction (Sepsis protocol)  Skilled nursing orders: COPD management  Skilled nursing orders: Other  Frequency: 1 Week 1     This document serves as a request of services and does not  constitute Insurance authorization or approval of services.  To determine eligibility, please contact the members Insurance carrier to verify and review coverage.     If you have medical questions regarding this request for services. Please contact [address] at [phone] between the hours of 8:00am - 5:00pm (Mon-Fri).             Authorizing Provider:Yadira Delarosa MD  Order Entered By: Yadira Delarosa MD 3/21/2017 8:36 AM     Electronically signed by: Yadira Delarosa MD (NPI: [id number]) 3/21/2017 8:36 AM

## 2017-03-22 ENCOUNTER — OUTSIDE FACILITY SERVICE (OUTPATIENT)
Dept: FAMILY MEDICINE CLINIC | Facility: CLINIC | Age: 57
End: 2017-03-22

## 2017-03-22 LAB
GLUCOSE BLDC GLUCOMTR-MCNC: 342 MG/DL (ref 70–130)
GLUCOSE BLDC GLUCOMTR-MCNC: 422 MG/DL (ref 70–130)
GLUCOSE BLDC GLUCOMTR-MCNC: 435 MG/DL (ref 70–130)
GLUCOSE BLDC GLUCOMTR-MCNC: 533 MG/DL (ref 70–130)
GLUCOSE BLDC GLUCOMTR-MCNC: 562 MG/DL (ref 70–130)
GLUCOSE BLDC GLUCOMTR-MCNC: 562 MG/DL (ref 70–130)
GLUCOSE BLDC GLUCOMTR-MCNC: 573 MG/DL (ref 70–130)

## 2017-03-23 LAB
GLUCOSE BLDC GLUCOMTR-MCNC: 166 MG/DL (ref 70–130)
GLUCOSE BLDC GLUCOMTR-MCNC: 277 MG/DL (ref 70–130)
GLUCOSE BLDC GLUCOMTR-MCNC: 385 MG/DL (ref 70–130)
GLUCOSE BLDC GLUCOMTR-MCNC: 471 MG/DL (ref 70–130)

## 2017-03-31 ENCOUNTER — OFFICE VISIT (OUTPATIENT)
Dept: FAMILY MEDICINE CLINIC | Facility: CLINIC | Age: 57
End: 2017-03-31

## 2017-03-31 VITALS
DIASTOLIC BLOOD PRESSURE: 88 MMHG | WEIGHT: 282 LBS | HEIGHT: 62 IN | SYSTOLIC BLOOD PRESSURE: 130 MMHG | BODY MASS INDEX: 51.89 KG/M2 | OXYGEN SATURATION: 95 % | HEART RATE: 75 BPM

## 2017-03-31 DIAGNOSIS — E66.9 DIABETES MELLITUS TYPE 2 IN OBESE (HCC): ICD-10-CM

## 2017-03-31 DIAGNOSIS — J15.212 PNEUMONIA OF BOTH LOWER LOBES DUE TO METHICILLIN RESISTANT STAPHYLOCOCCUS AUREUS (MRSA) (HCC): Primary | ICD-10-CM

## 2017-03-31 DIAGNOSIS — E11.69 DIABETES MELLITUS TYPE 2 IN OBESE (HCC): ICD-10-CM

## 2017-03-31 PROCEDURE — 99213 OFFICE O/P EST LOW 20 MIN: CPT | Performed by: FAMILY MEDICINE

## 2017-03-31 RX ORDER — NITROGLYCERIN 0.4 MG/1
0.4 TABLET SUBLINGUAL AS NEEDED
Qty: 30 TABLET | Refills: 3 | Status: SHIPPED | OUTPATIENT
Start: 2017-03-31 | End: 2018-09-04 | Stop reason: SDUPTHER

## 2017-03-31 RX ORDER — CETIRIZINE HYDROCHLORIDE 10 MG/1
10 TABLET ORAL DAILY
Qty: 30 TABLET | Refills: 3 | Status: SHIPPED | OUTPATIENT
Start: 2017-03-31 | End: 2017-06-12 | Stop reason: SDUPTHER

## 2017-03-31 RX ORDER — PROMETHAZINE HYDROCHLORIDE 25 MG/1
25 TABLET ORAL EVERY 6 HOURS PRN
Qty: 30 TABLET | Refills: 0 | Status: SHIPPED | OUTPATIENT
Start: 2017-03-31 | End: 2018-02-02 | Stop reason: SDUPTHER

## 2017-03-31 RX ORDER — METFORMIN HYDROCHLORIDE 1000 MG/1
1000 TABLET, FILM COATED, EXTENDED RELEASE ORAL 2 TIMES DAILY WITH MEALS
Qty: 60 TABLET | Refills: 3 | Status: SHIPPED | OUTPATIENT
Start: 2017-03-31 | End: 2017-07-10 | Stop reason: SDUPTHER

## 2017-03-31 RX ORDER — CLINDAMYCIN HYDROCHLORIDE 150 MG/1
450 CAPSULE ORAL EVERY 6 HOURS SCHEDULED
Qty: 84 CAPSULE | Refills: 0 | Status: SHIPPED | OUTPATIENT
Start: 2017-03-31 | End: 2017-04-07

## 2017-03-31 RX ORDER — FLUCONAZOLE 150 MG/1
150 TABLET ORAL ONCE
Qty: 1 TABLET | Refills: 0 | Status: SHIPPED | OUTPATIENT
Start: 2017-03-31 | End: 2017-03-31

## 2017-03-31 RX ORDER — GABAPENTIN 800 MG/1
800 TABLET ORAL 3 TIMES DAILY
Qty: 90 TABLET | Refills: 3 | Status: SHIPPED | OUTPATIENT
Start: 2017-03-31 | End: 2017-09-07 | Stop reason: SDUPTHER

## 2017-03-31 RX ORDER — ATORVASTATIN CALCIUM 20 MG/1
20 TABLET, FILM COATED ORAL DAILY
Qty: 30 TABLET | Refills: 3 | Status: SHIPPED | OUTPATIENT
Start: 2017-03-31 | End: 2018-04-05 | Stop reason: SDUPTHER

## 2017-03-31 RX ORDER — CLOBETASOL PROPIONATE 0.5 MG/ML
LOTION TOPICAL
Qty: 118 G | Refills: 3 | Status: SHIPPED | OUTPATIENT
Start: 2017-03-31 | End: 2017-09-07 | Stop reason: SDUPTHER

## 2017-03-31 RX ORDER — MONTELUKAST SODIUM 10 MG/1
10 TABLET ORAL NIGHTLY
Qty: 30 TABLET | Refills: 3 | Status: SHIPPED | OUTPATIENT
Start: 2017-03-31 | End: 2017-07-10 | Stop reason: SDUPTHER

## 2017-03-31 RX ORDER — CYCLOBENZAPRINE HCL 10 MG
10 TABLET ORAL 2 TIMES DAILY
Qty: 60 TABLET | Refills: 2 | Status: SHIPPED | OUTPATIENT
Start: 2017-03-31 | End: 2017-06-12 | Stop reason: SDUPTHER

## 2017-03-31 RX ORDER — PANTOPRAZOLE SODIUM 40 MG/1
40 TABLET, DELAYED RELEASE ORAL DAILY
Qty: 30 TABLET | Refills: 3 | Status: SHIPPED | OUTPATIENT
Start: 2017-03-31 | End: 2017-06-12 | Stop reason: SDUPTHER

## 2017-04-16 ENCOUNTER — HOSPITAL ENCOUNTER (INPATIENT)
Facility: HOSPITAL | Age: 57
LOS: 2 days | Discharge: HOME HEALTH | End: 2017-04-18
Attending: EMERGENCY MEDICINE | Admitting: FAMILY MEDICINE

## 2017-04-16 ENCOUNTER — APPOINTMENT (OUTPATIENT)
Dept: GENERAL RADIOLOGY | Facility: HOSPITAL | Age: 57
End: 2017-04-16

## 2017-04-16 ENCOUNTER — APPOINTMENT (OUTPATIENT)
Dept: ULTRASOUND IMAGING | Facility: HOSPITAL | Age: 57
End: 2017-04-16

## 2017-04-16 ENCOUNTER — APPOINTMENT (OUTPATIENT)
Dept: CT IMAGING | Facility: HOSPITAL | Age: 57
End: 2017-04-16

## 2017-04-16 ENCOUNTER — APPOINTMENT (OUTPATIENT)
Dept: CARDIOLOGY | Facility: HOSPITAL | Age: 57
End: 2017-04-16
Attending: FAMILY MEDICINE

## 2017-04-16 DIAGNOSIS — A41.4 SEPSIS DUE TO KLEBSIELLA PNEUMONIAE (HCC): ICD-10-CM

## 2017-04-16 DIAGNOSIS — R79.89 CREATININE ELEVATION: ICD-10-CM

## 2017-04-16 DIAGNOSIS — R73.9 HYPERGLYCEMIA: ICD-10-CM

## 2017-04-16 DIAGNOSIS — R53.83 LETHARGIC: ICD-10-CM

## 2017-04-16 DIAGNOSIS — Z74.09 IMPAIRED PHYSICAL MOBILITY: ICD-10-CM

## 2017-04-16 DIAGNOSIS — E87.20 LACTIC ACID ACIDOSIS: ICD-10-CM

## 2017-04-16 DIAGNOSIS — J44.0 CHRONIC OBSTRUCTIVE PULMONARY DISEASE WITH ACUTE LOWER RESPIRATORY INFECTION (HCC): ICD-10-CM

## 2017-04-16 DIAGNOSIS — R79.9 ELEVATED BUN: ICD-10-CM

## 2017-04-16 DIAGNOSIS — I95.9 HYPOTENSION, UNSPECIFIED HYPOTENSION TYPE: Primary | ICD-10-CM

## 2017-04-16 DIAGNOSIS — J44.9 CHRONIC OBSTRUCTIVE PULMONARY DISEASE, UNSPECIFIED COPD TYPE (HCC): ICD-10-CM

## 2017-04-16 DIAGNOSIS — D72.829 LEUKOCYTOSIS, UNSPECIFIED TYPE: ICD-10-CM

## 2017-04-16 DIAGNOSIS — A41.9 SEPSIS, DUE TO UNSPECIFIED ORGANISM: ICD-10-CM
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
## 2017-04-16 PROBLEM — N17.9 ACUTE KIDNEY INJURY: Status: ACTIVE | Noted: 2017-04-16

## 2017-04-16 PROBLEM — E03.9 HYPOTHYROIDISM: Status: ACTIVE | Noted: 2017-04-16

## 2017-04-16 PROBLEM — D64.9 ANEMIA: Status: ACTIVE | Noted: 2017-04-16

## 2017-04-16 LAB
ACETONE BLD QL: NEGATIVE
ALBUMIN SERPL-MCNC: 3.5 G/DL (ref 3.4–4.8)
ALBUMIN SERPL-MCNC: 4.2 G/DL (ref 3.4–4.8)
ALBUMIN/GLOB SERPL: 1 G/DL (ref 1.1–1.8)
ALBUMIN/GLOB SERPL: 1.2 G/DL (ref 1.1–1.8)
ALP SERPL-CCNC: 135 U/L (ref 38–126)
ALP SERPL-CCNC: 166 U/L (ref 38–126)
ALT SERPL W P-5'-P-CCNC: 28 U/L (ref 9–52)
ALT SERPL W P-5'-P-CCNC: 30 U/L (ref 9–52)
ANION GAP SERPL CALCULATED.3IONS-SCNC: 12 MMOL/L (ref 5–15)
ANION GAP SERPL CALCULATED.3IONS-SCNC: 18 MMOL/L (ref 5–15)
APTT PPP: 23.5 SECONDS (ref 20–40.3)
ARTERIAL PATENCY WRIST A: ABNORMAL
AST SERPL-CCNC: 24 U/L (ref 14–36)
AST SERPL-CCNC: 34 U/L (ref 14–36)
ATMOSPHERIC PRESS: ABNORMAL MMHG
BACTERIA UR QL AUTO: ABNORMAL /HPF
BASE EXCESS BLDA CALC-SCNC: -2 MMOL/L (ref -2.4–2.4)
BASOPHILS # BLD AUTO: 0.06 10*3/MM3 (ref 0–0.2)
BASOPHILS NFR BLD AUTO: 0.4 % (ref 0–2)
BDY SITE: ABNORMAL
BILIRUB SERPL-MCNC: 0.4 MG/DL (ref 0.2–1.3)
BILIRUB SERPL-MCNC: 0.6 MG/DL (ref 0.2–1.3)
BILIRUB UR QL STRIP: NEGATIVE
BUN BLD-MCNC: 40 MG/DL (ref 7–21)
BUN BLD-MCNC: 43 MG/DL (ref 7–21)
BUN/CREAT SERPL: 19.8 (ref 7–25)
BUN/CREAT SERPL: 24 (ref 7–25)
CA-I BLD-MCNC: 4.7 MG/DL (ref 4.5–4.9)
CA-I BLD-MCNC: 4.8 MG/DL (ref 4.5–4.9)
CALCIUM SPEC-SCNC: 10.1 MG/DL (ref 8.4–10.2)
CALCIUM SPEC-SCNC: 8.6 MG/DL (ref 8.4–10.2)
CHLORIDE SERPL-SCNC: 92 MMOL/L (ref 95–110)
CHLORIDE SERPL-SCNC: 98 MMOL/L (ref 95–110)
CK MB SERPL-CCNC: 0.3 NG/ML (ref 0–5)
CK MB SERPL-CCNC: 0.37 NG/ML (ref 0–5)
CK MB SERPL-CCNC: 0.6 NG/ML (ref 0–5)
CK SERPL-CCNC: 25 U/L (ref 30–135)
CK SERPL-CCNC: 26 U/L (ref 30–135)
CK SERPL-CCNC: 31 U/L (ref 30–135)
CLARITY UR: ABNORMAL
CO2 BLDA-SCNC: 24.6 MMOL/L (ref 23–27)
CO2 SERPL-SCNC: 23 MMOL/L (ref 22–31)
CO2 SERPL-SCNC: 25 MMOL/L (ref 22–31)
COLOR UR: YELLOW
CRE SCREEN PCR: NOT DETECTED
CREAT BLD-MCNC: 1.67 MG/DL (ref 0.5–1)
CREAT BLD-MCNC: 2.17 MG/DL (ref 0.5–1)
CRP SERPL-MCNC: 4.9 MG/DL (ref 0–1)
D-LACTATE SERPL-SCNC: 3.3 MMOL/L (ref 0.5–2)
DEPRECATED RDW RBC AUTO: 47.7 FL (ref 36.4–46.3)
DEPRECATED RDW RBC AUTO: 49.9 FL (ref 36.4–46.3)
EOSINOPHIL # BLD AUTO: 0.26 10*3/MM3 (ref 0–0.7)
EOSINOPHIL NFR BLD AUTO: 1.8 % (ref 0–7)
ERYTHROCYTE [DISTWIDTH] IN BLOOD BY AUTOMATED COUNT: 18.2 % (ref 11.5–14.5)
ERYTHROCYTE [DISTWIDTH] IN BLOOD BY AUTOMATED COUNT: 18.8 % (ref 11.5–14.5)
FERRITIN SERPL-MCNC: 8.4 NG/ML (ref 11.1–264)
FOLATE SERPL-MCNC: 12.2 NG/ML (ref 2.76–21)
GFR SERPL CREATININE-BSD FRML MDRD: 23 ML/MIN/1.73 (ref 51–120)
GFR SERPL CREATININE-BSD FRML MDRD: 32 ML/MIN/1.73 (ref 60–120)
GLOBULIN UR ELPH-MCNC: 3.4 GM/DL (ref 2.3–3.5)
GLOBULIN UR ELPH-MCNC: 3.5 GM/DL (ref 2.3–3.5)
GLUCOSE BLD-MCNC: 234 MG/DL (ref 60–100)
GLUCOSE BLD-MCNC: 265 MG/DL (ref 60–100)
GLUCOSE BLDA-MCNC: 230 MMOL/L
GLUCOSE BLDC GLUCOMTR-MCNC: 217 MG/DL (ref 70–130)
GLUCOSE BLDC GLUCOMTR-MCNC: 259 MG/DL (ref 70–130)
GLUCOSE BLDC GLUCOMTR-MCNC: 259 MG/DL (ref 70–130)
GLUCOSE BLDC GLUCOMTR-MCNC: 262 MG/DL (ref 70–130)
GLUCOSE BLDC GLUCOMTR-MCNC: 273 MG/DL (ref 70–130)
GLUCOSE UR STRIP-MCNC: ABNORMAL MG/DL
HBA1C MFR BLD: 12.86 % (ref 4–5.6)
HCO3 BLDA-SCNC: 23.3 MMOL/L (ref 22–26)
HCT VFR BLD AUTO: 30.3 % (ref 35–45)
HCT VFR BLD AUTO: 33.2 % (ref 35–45)
HCT VFR BLD CALC: 31 % (ref 38–47)
HGB BLD-MCNC: 10.8 G/DL (ref 12–15.5)
HGB BLD-MCNC: 9.5 G/DL (ref 12–15.5)
HGB BLDA-MCNC: 10.4 G/DL (ref 12–16)
HGB UR QL STRIP.AUTO: NEGATIVE
HOLD SPECIMEN: NORMAL
HOLD SPECIMEN: NORMAL
HYALINE CASTS UR QL AUTO: ABNORMAL /LPF
IMM GRANULOCYTES # BLD: 0.09 10*3/MM3 (ref 0–0.02)
IMM GRANULOCYTES NFR BLD: 0.6 % (ref 0–0.5)
IMP STRAIN: NOT DETECTED
INR PPP: 1 (ref 0.8–1.2)
IRON 24H UR-MRATE: 28 MCG/DL (ref 37–170)
IRON SATN MFR SERPL: 7 % (ref 15–50)
KETONES UR QL STRIP: NEGATIVE
KPC STRAIN: NOT DETECTED
LEUKOCYTE ESTERASE UR QL STRIP.AUTO: ABNORMAL
LIPASE SERPL-CCNC: 223 U/L (ref 23–300)
LYMPHOCYTES # BLD AUTO: 3.77 10*3/MM3 (ref 0.6–4.2)
LYMPHOCYTES NFR BLD AUTO: 26.1 % (ref 10–50)
MAGNESIUM SERPL-MCNC: 1.8 MG/DL (ref 1.6–2.3)
MCH RBC QN AUTO: 23 PG (ref 26.5–34)
MCH RBC QN AUTO: 23.5 PG (ref 26.5–34)
MCHC RBC AUTO-ENTMCNC: 31.4 G/DL (ref 31.4–36)
MCHC RBC AUTO-ENTMCNC: 32.5 G/DL (ref 31.4–36)
MCV RBC AUTO: 72.3 FL (ref 80–98)
MCV RBC AUTO: 73.4 FL (ref 80–98)
MODALITY: ABNORMAL
MONOCYTES # BLD AUTO: 0.74 10*3/MM3 (ref 0–0.9)
MONOCYTES NFR BLD AUTO: 5.1 % (ref 0–12)
MRSA DNA SPEC QL NAA+PROBE: NEGATIVE
NDM STRAIN: NOT DETECTED
NEUTROPHILS # BLD AUTO: 9.5 10*3/MM3 (ref 2–8.6)
NEUTROPHILS NFR BLD AUTO: 66 % (ref 37–80)
NITRITE UR QL STRIP: NEGATIVE
NT-PROBNP SERPL-MCNC: 267 PG/ML (ref 0–900)
NT-PROBNP SERPL-MCNC: 573 PG/ML (ref 0–900)
OXA 48 STRAIN: NOT DETECTED
PCO2 BLDA: 41.7 MM HG (ref 35–45)
PH BLDA: 7.37 PH UNITS (ref 7.35–7.45)
PH UR STRIP.AUTO: 5.5 [PH] (ref 5–9)
PHOSPHATE SERPL-MCNC: 4.1 MG/DL (ref 2.4–4.4)
PLATELET # BLD AUTO: 242 10*3/MM3 (ref 150–450)
PLATELET # BLD AUTO: 342 10*3/MM3 (ref 150–450)
PMV BLD AUTO: 8.7 FL (ref 8–12)
PMV BLD AUTO: 9.2 FL (ref 8–12)
PO2 BLDA: 91.4 MM HG (ref 80–105)
POTASSIUM BLD-SCNC: 3.8 MMOL/L (ref 3.5–5.1)
POTASSIUM BLD-SCNC: 4.5 MMOL/L (ref 3.5–5.1)
POTASSIUM BLDA-SCNC: 4.09 MMOL/L (ref 3.6–4.9)
PROT SERPL-MCNC: 6.9 G/DL (ref 6.3–8.6)
PROT SERPL-MCNC: 7.7 G/DL (ref 6.3–8.6)
PROT UR QL STRIP: ABNORMAL
PROTHROMBIN TIME: 13.1 SECONDS (ref 11.1–15.3)
RBC # BLD AUTO: 4.13 10*6/MM3 (ref 3.77–5.16)
RBC # BLD AUTO: 4.59 10*6/MM3 (ref 3.77–5.16)
RBC # UR: ABNORMAL /HPF
REF LAB TEST METHOD: ABNORMAL
SAO2 % BLDCOA: 96.5 %
SODIUM BLD-SCNC: 133 MMOL/L (ref 137–145)
SODIUM BLD-SCNC: 135 MMOL/L (ref 137–145)
SODIUM BLDA-SCNC: 132.5 MMOL/L (ref 138–146)
SP GR UR STRIP: 1.01 (ref 1–1.03)
SQUAMOUS #/AREA URNS HPF: ABNORMAL /HPF
TIBC SERPL-MCNC: 389 MCG/DL (ref 265–497)
TROPONIN I SERPL-MCNC: <0.012 NG/ML
TSH SERPL DL<=0.05 MIU/L-ACNC: 5.28 MIU/ML (ref 0.46–4.68)
UROBILINOGEN UR QL STRIP: ABNORMAL
VIM STRAIN: NOT DETECTED
VIT B12 BLD-MCNC: 316 PG/ML (ref 239–931)
WBC NRBC COR # BLD: 12.21 10*3/MM3 (ref 3.2–9.8)
WBC NRBC COR # BLD: 14.42 10*3/MM3 (ref 3.2–9.8)
WBC UR QL AUTO: ABNORMAL /HPF
WHOLE BLOOD HOLD SPECIMEN: NORMAL
WHOLE BLOOD HOLD SPECIMEN: NORMAL

## 2017-04-16 PROCEDURE — 36600 WITHDRAWAL OF ARTERIAL BLOOD: CPT

## 2017-04-16 PROCEDURE — 93306 TTE W/DOPPLER COMPLETE: CPT | Performed by: INTERNAL MEDICINE

## 2017-04-16 PROCEDURE — 84439 ASSAY OF FREE THYROXINE: CPT | Performed by: FAMILY MEDICINE

## 2017-04-16 PROCEDURE — 87040 BLOOD CULTURE FOR BACTERIA: CPT | Performed by: FAMILY MEDICINE

## 2017-04-16 PROCEDURE — 82803 BLOOD GASES ANY COMBINATION: CPT | Performed by: EMERGENCY MEDICINE

## 2017-04-16 PROCEDURE — 80053 COMPREHEN METABOLIC PANEL: CPT | Performed by: FAMILY MEDICINE

## 2017-04-16 PROCEDURE — 87077 CULTURE AEROBIC IDENTIFY: CPT | Performed by: EMERGENCY MEDICINE

## 2017-04-16 PROCEDURE — 63710000001 INSULIN ASPART PER 5 UNITS: Performed by: FAMILY MEDICINE

## 2017-04-16 PROCEDURE — 84443 ASSAY THYROID STIM HORMONE: CPT | Performed by: EMERGENCY MEDICINE

## 2017-04-16 PROCEDURE — 83880 ASSAY OF NATRIURETIC PEPTIDE: CPT | Performed by: EMERGENCY MEDICINE

## 2017-04-16 PROCEDURE — 84484 ASSAY OF TROPONIN QUANT: CPT | Performed by: EMERGENCY MEDICINE

## 2017-04-16 PROCEDURE — 87086 URINE CULTURE/COLONY COUNT: CPT | Performed by: EMERGENCY MEDICINE

## 2017-04-16 PROCEDURE — 99285 EMERGENCY DEPT VISIT HI MDM: CPT

## 2017-04-16 PROCEDURE — 82962 GLUCOSE BLOOD TEST: CPT

## 2017-04-16 PROCEDURE — 83735 ASSAY OF MAGNESIUM: CPT | Performed by: EMERGENCY MEDICINE

## 2017-04-16 PROCEDURE — 94799 UNLISTED PULMONARY SVC/PX: CPT

## 2017-04-16 PROCEDURE — 63710000001 INSULIN DETEMIR PER 5 UNITS: Performed by: FAMILY MEDICINE

## 2017-04-16 PROCEDURE — 82330 ASSAY OF CALCIUM: CPT | Performed by: EMERGENCY MEDICINE

## 2017-04-16 PROCEDURE — 83690 ASSAY OF LIPASE: CPT | Performed by: EMERGENCY MEDICINE

## 2017-04-16 PROCEDURE — 87205 SMEAR GRAM STAIN: CPT | Performed by: FAMILY MEDICINE

## 2017-04-16 PROCEDURE — 81001 URINALYSIS AUTO W/SCOPE: CPT | Performed by: EMERGENCY MEDICINE

## 2017-04-16 PROCEDURE — 83605 ASSAY OF LACTIC ACID: CPT | Performed by: FAMILY MEDICINE

## 2017-04-16 PROCEDURE — 83550 IRON BINDING TEST: CPT | Performed by: FAMILY MEDICINE

## 2017-04-16 PROCEDURE — 85730 THROMBOPLASTIN TIME PARTIAL: CPT | Performed by: EMERGENCY MEDICINE

## 2017-04-16 PROCEDURE — 93306 TTE W/DOPPLER COMPLETE: CPT

## 2017-04-16 PROCEDURE — 70450 CT HEAD/BRAIN W/O DYE: CPT

## 2017-04-16 PROCEDURE — 86140 C-REACTIVE PROTEIN: CPT | Performed by: EMERGENCY MEDICINE

## 2017-04-16 PROCEDURE — 82728 ASSAY OF FERRITIN: CPT | Performed by: FAMILY MEDICINE

## 2017-04-16 PROCEDURE — 87186 SC STD MICRODIL/AGAR DIL: CPT | Performed by: EMERGENCY MEDICINE

## 2017-04-16 PROCEDURE — 82607 VITAMIN B-12: CPT | Performed by: FAMILY MEDICINE

## 2017-04-16 PROCEDURE — 82746 ASSAY OF FOLIC ACID SERUM: CPT | Performed by: FAMILY MEDICINE

## 2017-04-16 PROCEDURE — 82553 CREATINE MB FRACTION: CPT | Performed by: EMERGENCY MEDICINE

## 2017-04-16 PROCEDURE — 71010 HC CHEST PA OR AP: CPT

## 2017-04-16 PROCEDURE — 83605 ASSAY OF LACTIC ACID: CPT | Performed by: EMERGENCY MEDICINE

## 2017-04-16 PROCEDURE — 93880 EXTRACRANIAL BILAT STUDY: CPT

## 2017-04-16 PROCEDURE — 85025 COMPLETE CBC W/AUTO DIFF WBC: CPT | Performed by: EMERGENCY MEDICINE

## 2017-04-16 PROCEDURE — 87081 CULTURE SCREEN ONLY: CPT | Performed by: FAMILY MEDICINE

## 2017-04-16 PROCEDURE — 87040 BLOOD CULTURE FOR BACTERIA: CPT | Performed by: EMERGENCY MEDICINE

## 2017-04-16 PROCEDURE — 83540 ASSAY OF IRON: CPT | Performed by: FAMILY MEDICINE

## 2017-04-16 PROCEDURE — 85027 COMPLETE CBC AUTOMATED: CPT | Performed by: FAMILY MEDICINE

## 2017-04-16 PROCEDURE — 73610 X-RAY EXAM OF ANKLE: CPT

## 2017-04-16 PROCEDURE — 25010000002 CEFTRIAXONE: Performed by: FAMILY MEDICINE

## 2017-04-16 PROCEDURE — 87070 CULTURE OTHR SPECIMN AEROBIC: CPT | Performed by: FAMILY MEDICINE

## 2017-04-16 PROCEDURE — 93005 ELECTROCARDIOGRAM TRACING: CPT | Performed by: EMERGENCY MEDICINE

## 2017-04-16 PROCEDURE — 87641 MR-STAPH DNA AMP PROBE: CPT | Performed by: FAMILY MEDICINE

## 2017-04-16 PROCEDURE — 94640 AIRWAY INHALATION TREATMENT: CPT

## 2017-04-16 PROCEDURE — 82009 KETONE BODYS QUAL: CPT | Performed by: EMERGENCY MEDICINE

## 2017-04-16 PROCEDURE — 80053 COMPREHEN METABOLIC PANEL: CPT | Performed by: EMERGENCY MEDICINE

## 2017-04-16 PROCEDURE — 93010 ELECTROCARDIOGRAM REPORT: CPT | Performed by: INTERNAL MEDICINE

## 2017-04-16 PROCEDURE — 82550 ASSAY OF CK (CPK): CPT | Performed by: EMERGENCY MEDICINE

## 2017-04-16 PROCEDURE — 99223 1ST HOSP IP/OBS HIGH 75: CPT | Performed by: FAMILY MEDICINE

## 2017-04-16 PROCEDURE — 85610 PROTHROMBIN TIME: CPT | Performed by: EMERGENCY MEDICINE

## 2017-04-16 PROCEDURE — 25010000002 ENOXAPARIN PER 10 MG: Performed by: FAMILY MEDICINE

## 2017-04-16 PROCEDURE — 83036 HEMOGLOBIN GLYCOSYLATED A1C: CPT | Performed by: FAMILY MEDICINE

## 2017-04-16 PROCEDURE — 94760 N-INVAS EAR/PLS OXIMETRY 1: CPT

## 2017-04-16 PROCEDURE — 84100 ASSAY OF PHOSPHORUS: CPT | Performed by: EMERGENCY MEDICINE

## 2017-04-16 RX ORDER — NITROGLYCERIN 0.4 MG/1
0.4 TABLET SUBLINGUAL AS NEEDED
Status: DISCONTINUED | OUTPATIENT
Start: 2017-04-16 | End: 2017-04-18 | Stop reason: HOSPADM

## 2017-04-16 RX ORDER — ASPIRIN 325 MG
325 TABLET ORAL ONCE
Status: COMPLETED | OUTPATIENT
Start: 2017-04-16 | End: 2017-04-17

## 2017-04-16 RX ORDER — FERROUS SULFATE TAB EC 324 MG (65 MG FE EQUIVALENT) 324 (65 FE) MG
324 TABLET DELAYED RESPONSE ORAL
Status: DISCONTINUED | OUTPATIENT
Start: 2017-04-17 | End: 2017-04-18 | Stop reason: HOSPADM

## 2017-04-16 RX ORDER — CYCLOBENZAPRINE HCL 10 MG
10 TABLET ORAL 2 TIMES DAILY
Status: DISCONTINUED | OUTPATIENT
Start: 2017-04-16 | End: 2017-04-18 | Stop reason: HOSPADM

## 2017-04-16 RX ORDER — MONTELUKAST SODIUM 10 MG/1
10 TABLET ORAL NIGHTLY
Status: DISCONTINUED | OUTPATIENT
Start: 2017-04-16 | End: 2017-04-18 | Stop reason: HOSPADM

## 2017-04-16 RX ORDER — SODIUM CHLORIDE 9 MG/ML
75 INJECTION, SOLUTION INTRAVENOUS CONTINUOUS
Status: DISCONTINUED | OUTPATIENT
Start: 2017-04-16 | End: 2017-04-18 | Stop reason: HOSPADM

## 2017-04-16 RX ORDER — IPRATROPIUM BROMIDE AND ALBUTEROL SULFATE 2.5; .5 MG/3ML; MG/3ML
3 SOLUTION RESPIRATORY (INHALATION) 4 TIMES DAILY
Status: DISCONTINUED | OUTPATIENT
Start: 2017-04-16 | End: 2017-04-18 | Stop reason: HOSPADM

## 2017-04-16 RX ORDER — GABAPENTIN 400 MG/1
800 CAPSULE ORAL 3 TIMES DAILY
Status: DISCONTINUED | OUTPATIENT
Start: 2017-04-16 | End: 2017-04-18 | Stop reason: HOSPADM

## 2017-04-16 RX ORDER — ATORVASTATIN CALCIUM 20 MG/1
20 TABLET, FILM COATED ORAL DAILY
Status: DISCONTINUED | OUTPATIENT
Start: 2017-04-16 | End: 2017-04-18 | Stop reason: HOSPADM

## 2017-04-16 RX ORDER — CETIRIZINE HYDROCHLORIDE 5 MG/1
5 TABLET ORAL DAILY
Status: DISCONTINUED | OUTPATIENT
Start: 2017-04-16 | End: 2017-04-18 | Stop reason: HOSPADM

## 2017-04-16 RX ORDER — METOPROLOL TARTRATE 50 MG/1
50 TABLET, FILM COATED ORAL 2 TIMES DAILY
Status: DISCONTINUED | OUTPATIENT
Start: 2017-04-16 | End: 2017-04-18 | Stop reason: HOSPADM

## 2017-04-16 RX ORDER — CLOBETASOL PROPIONATE 0.5 MG/G
CREAM TOPICAL EVERY 12 HOURS SCHEDULED
Status: DISCONTINUED | OUTPATIENT
Start: 2017-04-16 | End: 2017-04-18 | Stop reason: HOSPADM

## 2017-04-16 RX ORDER — SODIUM CHLORIDE 9 MG/ML
125 INJECTION, SOLUTION INTRAVENOUS CONTINUOUS
Status: DISCONTINUED | OUTPATIENT
Start: 2017-04-16 | End: 2017-04-18

## 2017-04-16 RX ORDER — SODIUM CHLORIDE 0.9 % (FLUSH) 0.9 %
1-10 SYRINGE (ML) INJECTION AS NEEDED
Status: DISCONTINUED | OUTPATIENT
Start: 2017-04-16 | End: 2017-04-18 | Stop reason: HOSPADM

## 2017-04-16 RX ORDER — PROMETHAZINE HYDROCHLORIDE 25 MG/1
25 TABLET ORAL EVERY 6 HOURS PRN
Status: DISCONTINUED | OUTPATIENT
Start: 2017-04-16 | End: 2017-04-18 | Stop reason: HOSPADM

## 2017-04-16 RX ORDER — CLOPIDOGREL BISULFATE 75 MG/1
75 TABLET ORAL DAILY
Status: DISCONTINUED | OUTPATIENT
Start: 2017-04-16 | End: 2017-04-18 | Stop reason: HOSPADM

## 2017-04-16 RX ORDER — SODIUM CHLORIDE 0.9 % (FLUSH) 0.9 %
10 SYRINGE (ML) INJECTION AS NEEDED
Status: DISCONTINUED | OUTPATIENT
Start: 2017-04-16 | End: 2017-04-18 | Stop reason: HOSPADM

## 2017-04-16 RX ORDER — BUDESONIDE AND FORMOTEROL FUMARATE DIHYDRATE 160; 4.5 UG/1; UG/1
2 AEROSOL RESPIRATORY (INHALATION)
Status: DISCONTINUED | OUTPATIENT
Start: 2017-04-16 | End: 2017-04-18 | Stop reason: HOSPADM

## 2017-04-16 RX ORDER — HYDROCODONE BITARTRATE AND ACETAMINOPHEN 7.5; 325 MG/1; MG/1
1 TABLET ORAL EVERY 6 HOURS PRN
Status: DISCONTINUED | OUTPATIENT
Start: 2017-04-16 | End: 2017-04-18 | Stop reason: HOSPADM

## 2017-04-16 RX ORDER — PANTOPRAZOLE SODIUM 40 MG/1
40 TABLET, DELAYED RELEASE ORAL DAILY
Status: DISCONTINUED | OUTPATIENT
Start: 2017-04-16 | End: 2017-04-18 | Stop reason: HOSPADM

## 2017-04-16 RX ADMIN — HYDROCODONE BITARTRATE AND ACETAMINOPHEN 1 TABLET: 7.5; 325 TABLET ORAL at 11:37

## 2017-04-16 RX ADMIN — CETIRIZINE HYDROCHLORIDE 5 MG: 5 TABLET, FILM COATED ORAL at 11:37

## 2017-04-16 RX ADMIN — PANTOPRAZOLE SODIUM 40 MG: 40 TABLET, DELAYED RELEASE ORAL at 08:33

## 2017-04-16 RX ADMIN — IPRATROPIUM BROMIDE AND ALBUTEROL SULFATE 3 ML: 2.5; .5 SOLUTION RESPIRATORY (INHALATION) at 15:27

## 2017-04-16 RX ADMIN — SODIUM CHLORIDE 1000 ML: 900 INJECTION, SOLUTION INTRAVENOUS at 04:06

## 2017-04-16 RX ADMIN — IPRATROPIUM BROMIDE AND ALBUTEROL SULFATE 3 ML: 2.5; .5 SOLUTION RESPIRATORY (INHALATION) at 19:50

## 2017-04-16 RX ADMIN — CLOBETASOL PROPIONATE: 0.5 CREAM TOPICAL at 08:34

## 2017-04-16 RX ADMIN — INSULIN ASPART 45 UNITS: 100 INJECTION, SOLUTION INTRAVENOUS; SUBCUTANEOUS at 08:33

## 2017-04-16 RX ADMIN — CYCLOBENZAPRINE HYDROCHLORIDE 10 MG: 10 TABLET, FILM COATED ORAL at 08:45

## 2017-04-16 RX ADMIN — ENOXAPARIN SODIUM 30 MG: 30 INJECTION SUBCUTANEOUS at 08:33

## 2017-04-16 RX ADMIN — INSULIN DETEMIR 95 UNITS: 100 INJECTION, SOLUTION SUBCUTANEOUS at 21:46

## 2017-04-16 RX ADMIN — GABAPENTIN 800 MG: 400 CAPSULE ORAL at 21:44

## 2017-04-16 RX ADMIN — GABAPENTIN 800 MG: 400 CAPSULE ORAL at 16:56

## 2017-04-16 RX ADMIN — SODIUM CHLORIDE 2000 ML: 900 INJECTION, SOLUTION INTRAVENOUS at 01:37

## 2017-04-16 RX ADMIN — SODIUM CHLORIDE 125 ML/HR: 9 INJECTION, SOLUTION INTRAVENOUS at 16:56

## 2017-04-16 RX ADMIN — MONTELUKAST SODIUM 10 MG: 10 TABLET, FILM COATED ORAL at 21:45

## 2017-04-16 RX ADMIN — GABAPENTIN 800 MG: 400 CAPSULE ORAL at 08:33

## 2017-04-16 RX ADMIN — HYDROCODONE BITARTRATE AND ACETAMINOPHEN 1 TABLET: 7.5; 325 TABLET ORAL at 18:16

## 2017-04-16 RX ADMIN — ATORVASTATIN CALCIUM 20 MG: 20 TABLET, FILM COATED ORAL at 11:37

## 2017-04-16 RX ADMIN — INSULIN ASPART 45 UNITS: 100 INJECTION, SOLUTION INTRAVENOUS; SUBCUTANEOUS at 11:17

## 2017-04-16 RX ADMIN — CEFTRIAXONE 1 G: 1 INJECTION, POWDER, FOR SOLUTION INTRAMUSCULAR; INTRAVENOUS at 09:41

## 2017-04-16 RX ADMIN — SODIUM CHLORIDE 125 ML/HR: 900 INJECTION, SOLUTION INTRAVENOUS at 17:17

## 2017-04-16 RX ADMIN — SODIUM CHLORIDE 125 ML/HR: 900 INJECTION, SOLUTION INTRAVENOUS at 08:32

## 2017-04-16 RX ADMIN — CLOBETASOL PROPIONATE: 0.5 CREAM TOPICAL at 21:43

## 2017-04-16 RX ADMIN — SODIUM CHLORIDE 500 ML: 9 INJECTION, SOLUTION INTRAVENOUS at 00:58

## 2017-04-16 RX ADMIN — CYCLOBENZAPRINE HYDROCHLORIDE 10 MG: 10 TABLET, FILM COATED ORAL at 16:56

## 2017-04-16 RX ADMIN — CLOPIDOGREL BISULFATE 75 MG: 75 TABLET ORAL at 08:33

## 2017-04-16 RX ADMIN — INSULIN ASPART 45 UNITS: 100 INJECTION, SOLUTION INTRAVENOUS; SUBCUTANEOUS at 18:11

## 2017-04-16 NOTE — H&P
"<principal problem not specified>  Subjective:     Yamileth Slater is a 57 y.o. female who presents for multiple falls that occurred prior to admission.  Denies any loss of consciousness.  She's been having some slurred speech since then.  She does endorse a resting tremor bilateral hands.  She is not compliant with her CPAP therapy at home.  She does feel like she had a \"flutter\" in her heart about the time she was falling.   The following portions of the patient's history were reviewed and updated as appropriate: allergies, current medications, past family history, past medical history, past social history, past surgical history and problem list.    Concurrent Medical Hx:  Past Medical History:   Diagnosis Date   • Anxiety states    • Asthma    • Candidiasis of mouth    • Candidiasis of vulva and vagina    • Carotid artery stenosis     DWIGHT 0-15%, LICA 0-15%. LICA stent 5/2014.   • Chest pain    • Chronic folliculitis    • Chronic obstructive lung disease    • Coronary arteriosclerosis    • Diabetes mellitus     no retinopathy; A1C 9.1      • Dyslipidemia    • Dysphagia    • Encounter for general adult medical examination with abnormal findings    • Essential hypertension    • Excoriated eczema     asteosis/keratosis   • Furuncle of neck    • GERD (gastroesophageal reflux disease)    • History of colon polyps    • Hypertensive disorder    • Infection of skin and subcutaneous tissue     rt perineal abscess   • Infestation by Sarcoptes scabiei deepak hominis    • Kidney stone    • Leukocytosis    • Lower limb anomaly     Abscess of lower limb - ANTX causing n/v      • Mixed hyperlipidemia    • Morbid obesity due to excess calories     BMI 44.5   • Need for vaccination    • Neurologic disorder associated with diabetes mellitus    • Non-healing surgical wound     LLE EVHa   • Pain     LLE   • Peripheral vascular disease    • Rash    • Shoulder joint pain    • Sleep apnea    • Surgical follow-up care     5/27/14 L " carotid stent   • Tobacco dependence syndrome     1/2ppd      • Type 2 diabetes mellitus    • Viral wart     RIGHT middle phalanx   • Vitamin D deficiency        Past Surgical Hx:  Past Surgical History:   Procedure Laterality Date   • CARDIAC CATHETERIZATION  08/09/2013    Severe multivessel CAD with critical lesions noted in the RCA, obtuse marginal two, ramus intermemdious, and LAD as described above. Normal LV systolic function with no wall motion abnormality.    • CARDIAC CATHETERIZATION  05/27/2014     LEFT Carotid Stent    • COLONOSCOPY  05/02/2016    Normal colon.Diverticulosis in the sigmoid colon.No specimens collected.    • CORONARY ARTERY BYPASS GRAFT  11/15/2013    CABG x 3 with LIMA to LAD and coronary endarterectomy to LAD, SVG to Ramus intermedius branch and SVG to PDA.    • ENDOSCOPY  09/23/2015     Esophageal stricture present.Normal stomach.Normal duodenum.    • ENDOSCOPY  11/16/2015    w/ dilatation - Esophageal stricture present,Dilatation performed.Normal stomach and duodenum.    • INCISION AND DRAINAGE ABSCESS  01/02/2016    Incision and drainage of right perineal abscess.    • INCISION AND DRAINAGE ABSCESS  02/18/2014    Incision and Drainage of abscess of left lower leg    • OTHER SURGICAL HISTORY  01/25/2016    DESTRUCTION OF BENIGN LESION      • OTHER SURGICAL HISTORY  04/18/2014    ear/neck - L attempted CEA, Neck Dissection      Family Hx:  Family History   Problem Relation Age of Onset   • Coronary artery disease Other    • Diabetes Other    • Heart disease Other    • Hypertension Other       Social History:  Social History     Social History   • Marital status:      Spouse name: N/A   • Number of children: N/A   • Years of education: N/A     Occupational History   • Not on file.     Social History Main Topics   • Smoking status: Current Every Day Smoker     Types: Cigarettes   • Smokeless tobacco: Not on file   • Alcohol use No   • Drug use: No   • Sexual activity: Not on file      Other Topics Concern   • Not on file     Social History Narrative       Home Medications:  No current facility-administered medications on file prior to encounter.      Current Outpatient Prescriptions on File Prior to Encounter   Medication Sig Dispense Refill   • atorvastatin (LIPITOR) 20 MG tablet Take 1 tablet by mouth Daily. 30 tablet 3   • Blood Glucose Monitoring Suppl ([employer] BLOOD GLUCOSE METER) W/DEVICE kit 1 each 3 (Three) Times a Day. 1 kit 3   • budesonide-formoterol (SYMBICORT) 160-4.5 MCG/ACT inhaler Inhale 2 puffs 2 (Two) Times a Day. 1 inhaler 6   • cetirizine (zyrTEC) 10 MG tablet Take 1 tablet by mouth Daily. 30 tablet 3   • clobetasol (CLOBEX) 0.05 % lotion apply to skin bid x 1-2w then prn. generic 118 g 3   • clopidogrel (PLAVIX) 75 MG tablet TAKE 1 TABLET BY MOUTH DAILY. 30 tablet 3   • cyclobenzaprine (FLEXERIL) 10 MG tablet Take 1 tablet by mouth 2 (Two) Times a Day. 60 tablet 2   • furosemide (LASIX) 40 MG tablet Take 1 tablet by mouth Daily. 30 tablet 3   • gabapentin (NEURONTIN) 800 MG tablet Take 1 tablet by mouth 3 (Three) Times a Day. 90 tablet 3   • glucose blood test strip 1 each by Other route 4 (Four) Times a Day. Use as instructed 100 each 12   • insulin glargine (LANTUS) 100 UNIT/ML injection Inject 95 Units under the skin Every 12 (Twelve) Hours. 1 each 11   • Insulin Lispro (HUMALOG KWIKPEN) 100 UNIT/ML solution pen-injector Inject 45 Units under the skin 3 (Three) Times a Day Before Meals. 60 mL 11   • Insulin Pen Needle (NOVOFINE) 30G X 8 MM misc As directed 4 times daily 100 each 11   • ipratropium-albuterol (DUO-NEB) 0.5-2.5 mg/mL nebulizer Take 3 mL by nebulization 4 (Four) Times a Day. ICD10 code J96.01 360 mL 0   • lisinopril (PRINIVIL,ZESTRIL) 10 MG tablet TAKE 1 TABLET BY MOUTH DAILY. 30 tablet 3   • metFORMIN (GLUMETZA) 1000 MG (MOD) 24 hr tablet Take 1 tablet by mouth 2 (Two) Times a Day With Meals. 60 tablet 3   • metoprolol tartrate (LOPRESSOR) 50 MG tablet Take 1  "tablet by mouth 2 (Two) Times a Day. 60 tablet 3   • montelukast (SINGULAIR) 10 MG tablet Take 1 tablet by mouth Every Night. 30 tablet 3   • naproxen (NAPROSYN) 500 MG tablet Take 1 tablet by mouth 2 (Two) Times a Day With Meals. 60 tablet 3   • nitroglycerin (NITROSTAT) 0.4 MG SL tablet Place 1 tablet under the tongue As Needed for Chest Pain. Take no more than 3 doses in 15 minutes. 30 tablet 3   • pantoprazole (PROTONIX) 40 MG EC tablet Take 1 tablet by mouth Daily. 30 tablet 3   • predniSONE (DELTASONE) 20 MG tablet Take 2 tablets by mouth Daily. 6 tablet 0   • promethazine (PHENERGAN) 25 MG tablet Take 1 tablet by mouth Every 6 (Six) Hours As Needed for Nausea or Vomiting. 30 tablet 0       Allergies:  Other    Review of Systems  Review of Systems   Constitutional: Positive for activity change. Negative for appetite change, fatigue and fever.   HENT: Negative for ear pain and sore throat.    Eyes: Negative for pain and visual disturbance.   Respiratory: Positive for cough and shortness of breath.    Cardiovascular: Positive for palpitations. Negative for chest pain.   Gastrointestinal: Negative for abdominal pain, constipation, diarrhea and nausea.   Endocrine: Negative for cold intolerance, heat intolerance, polydipsia, polyphagia and polyuria.   Genitourinary: Negative for difficulty urinating and dysuria.   Musculoskeletal: Positive for arthralgias and gait problem.   Skin: Negative for color change and rash.   Neurological: Positive for speech difficulty. Negative for dizziness, weakness and headaches.   Hematological: Negative for adenopathy. Does not bruise/bleed easily.   Psychiatric/Behavioral: Negative for agitation, confusion and sleep disturbance.       Objective:     /58  Pulse 80  Temp 97.1 °F (36.2 °C) (Temporal Artery )   Resp 20  Ht 62\" (157.5 cm)  Wt 280 lb 12.8 oz (127 kg)  SpO2 97%  BMI 51.36 kg/m2  Physical Exam   Constitutional: She is oriented to person, place, and time. She " appears well-developed and well-nourished.   HENT:   Head: Normocephalic and atraumatic.   Right Ear: External ear normal.   Left Ear: External ear normal.   Nose: Nose normal.   Mouth/Throat: Oropharynx is clear and moist.   Eyes: Conjunctivae and EOM are normal. Pupils are equal, round, and reactive to light.   Neck: Normal range of motion. Neck supple.   Cardiovascular: Normal rate, regular rhythm, normal heart sounds and intact distal pulses.    Pulmonary/Chest: Effort normal and breath sounds normal.   Abdominal: Soft. Bowel sounds are normal. She exhibits no distension. There is no tenderness.   Musculoskeletal: Normal range of motion.   Good range of motion on right ankle; no swelling   Neurological: She is alert and oriented to person, place, and time.   Skin: Skin is warm and dry.   Psychiatric: She has a normal mood and affect. Her speech is normal and behavior is normal. Cognition and memory are normal.     I reviewed the patient's new clinical results.  I reviewed the patient's new imaging results.  Assessment/Plan:     Active Hospital Problems (** Indicates Principal Problem)    Diagnosis Date Noted   • Hypotension [I95.9] 04/16/2017     -IV fluids started  -Current SBP is in 130's      • Fall [W19.XXXA] 04/16/2017     -No LOC  -CT of head ordered, carotid u/s, ECHO,  ekg, and telemetry     • Acute kidney injury [N17.9] 04/16/2017     -Continue IV fluids  -Will recheck Cr in the AM  -Hold nephrotoxic drugs      • Sepsis [A41.9] 04/16/2017     -Unknows source of infection  -Lactic Acid: 3.3  -Continue IV fluids, abx (zosyn)  -Adult sepsis bundle protocol  -D/C ABX if no source of infection found  -Lactic acid could be high because of dehydration      • Anemia [D64.9] 04/16/2017     -Chronic  -B12, foalte, iron/TIBC, ferritin      • Hypothyroidism [E03.9] 04/16/2017     -Ordered FT4; TSH is 5.280     • Diabetes mellitus type 2 in obese [E11.9, E66.9] 08/24/2016     -Insulin sliding scale   -Will order  A1C          Resolved Hospital Problems    Diagnosis Date Noted Date Resolved   No resolved problems to display.         I have seen and examined the patient.  I have reviewed the notes, assessments, and/or procedures performed by Dr. Jimenez, I concur with her/his documentation and assessment and plan for Yamileth Slater.        This document has been electronically signed by Pauline Martinez MD on April 16, 2017 12:36 PM

## 2017-04-16 NOTE — H&P
"HISTORY AND PHYSICAL  NAME: Yamileth Slater  : 1960  MRN: [id number]    DATE OF ADMISSION: 17    DATE & TIME SEEN: 17 8:11 AM    PCP: Yadira Delarosa MD    CODE STATUS: Full Code    CHIEF COMPLAINT Fall     HPI:  Altered Mental Status   This is a new problem. The current episode started today. The problem has been rapidly improving.   Hypotension   This is a new problem. The current episode started today. The problem has been rapidly improving. Nothing aggravates the symptoms. She has tried nothing for the symptoms.   Fall   The accident occurred 6 to 12 hours ago. The fall occurred from a stool. She fell from an unknown height. She landed on hard floor. There was no blood loss. Pertinent negatives include no bowel incontinence, hearing loss, hematuria, loss of consciousness or tingling. She has tried nothing for the symptoms.    Pt has been having slurred speech since the fall.  Pt denied loss of consciousness, no jerky movements of body parts, no fecal or urinary incontinence. Pt uses CPAP at night(not everyday); has a hx of COPD but not on home oxygen. Pt had cough, nausea, vomiting but no fever, no chest pain. Pt felt \"flutters\" in the heart.   Pt had EKG done in ER which showed NSR.     CONCURRENT MEDICAL HISTORY:  Past Medical History:   Diagnosis Date   • Anxiety states    • Asthma    • Candidiasis of mouth    • Candidiasis of vulva and vagina    • Carotid artery stenosis     DWIGHT 0-15%, LICA 0-15%. LICA stent 2014.   • Chest pain    • Chronic folliculitis    • Chronic obstructive lung disease    • Coronary arteriosclerosis    • Diabetes mellitus     no retinopathy; A1C 9.1      • Dyslipidemia    • Dysphagia    • Encounter for general adult medical examination with abnormal findings    • Essential hypertension    • Excoriated eczema     asteosis/keratosis   • Furuncle of neck    • GERD (gastroesophageal reflux disease)    • History of colon polyps    • Hypertensive disorder  "   • Infection of skin and subcutaneous tissue     rt perineal abscess   • Infestation by Sarcoptes scabiei deepak hominis    • Kidney stone    • Leukocytosis    • Lower limb anomaly     Abscess of lower limb - ANTX causing n/v      • Mixed hyperlipidemia    • Morbid obesity due to excess calories     BMI 44.5   • Need for vaccination    • Neurologic disorder associated with diabetes mellitus    • Non-healing surgical wound     LLE EVHa   • Pain     LLE   • Peripheral vascular disease    • Rash    • Shoulder joint pain    • Sleep apnea    • Surgical follow-up care     5/27/14 L carotid stent   • Tobacco dependence syndrome     1/2ppd      • Type 2 diabetes mellitus    • Viral wart     RIGHT middle phalanx   • Vitamin D deficiency        PAST SURGICAL HISTORY:  Past Surgical History:   Procedure Laterality Date   • CARDIAC CATHETERIZATION  08/09/2013    Severe multivessel CAD with critical lesions noted in the RCA, obtuse marginal two, ramus intermemdious, and LAD as described above. Normal LV systolic function with no wall motion abnormality.    • CARDIAC CATHETERIZATION  05/27/2014     LEFT Carotid Stent    • COLONOSCOPY  05/02/2016    Normal colon.Diverticulosis in the sigmoid colon.No specimens collected.    • CORONARY ARTERY BYPASS GRAFT  11/15/2013    CABG x 3 with LIMA to LAD and coronary endarterectomy to LAD, SVG to Ramus intermedius branch and SVG to PDA.    • ENDOSCOPY  09/23/2015     Esophageal stricture present.Normal stomach.Normal duodenum.    • ENDOSCOPY  11/16/2015    w/ dilatation - Esophageal stricture present,Dilatation performed.Normal stomach and duodenum.    • INCISION AND DRAINAGE ABSCESS  01/02/2016    Incision and drainage of right perineal abscess.    • INCISION AND DRAINAGE ABSCESS  02/18/2014    Incision and Drainage of abscess of left lower leg    • OTHER SURGICAL HISTORY  01/25/2016    DESTRUCTION OF BENIGN LESION      • OTHER SURGICAL HISTORY  04/18/2014    ear/neck - L attempted CEA, Neck  Dissection        FAMILY HISTORY:  Family History   Problem Relation Age of Onset   • Coronary artery disease Other    • Diabetes Other    • Heart disease Other    • Hypertension Other         SOCIAL HISTORY:  Social History     Social History   • Marital status:      Spouse name: N/A   • Number of children: N/A   • Years of education: N/A     Occupational History   • Not on file.     Social History Main Topics   • Smoking status: Current Every Day Smoker     Types: Cigarettes   • Smokeless tobacco: Not on file   • Alcohol use No   • Drug use: No   • Sexual activity: Not on file     Other Topics Concern   • Not on file     Social History Narrative       HOME MEDICATIONS:  Prescriptions Prior to Admission   Medication Sig Dispense Refill Last Dose   • atorvastatin (LIPITOR) 20 MG tablet Take 1 tablet by mouth Daily. 30 tablet 3 4/15/2017 at Unknown time   • Blood Glucose Monitoring Suppl ([facility] BLOOD GLUCOSE METER) W/DEVICE kit 1 each 3 (Three) Times a Day. 1 kit 3 Taking   • budesonide-formoterol (SYMBICORT) 160-4.5 MCG/ACT inhaler Inhale 2 puffs 2 (Two) Times a Day. 1 inhaler 6 Taking   • cetirizine (zyrTEC) 10 MG tablet Take 1 tablet by mouth Daily. 30 tablet 3    • clobetasol (CLOBEX) 0.05 % lotion apply to skin bid x 1-2w then prn. generic 118 g 3    • clopidogrel (PLAVIX) 75 MG tablet TAKE 1 TABLET BY MOUTH DAILY. 30 tablet 3 Taking   • cyclobenzaprine (FLEXERIL) 10 MG tablet Take 1 tablet by mouth 2 (Two) Times a Day. 60 tablet 2    • furosemide (LASIX) 40 MG tablet Take 1 tablet by mouth Daily. 30 tablet 3 Taking   • gabapentin (NEURONTIN) 800 MG tablet Take 1 tablet by mouth 3 (Three) Times a Day. 90 tablet 3    • glucose blood test strip 1 each by Other route 4 (Four) Times a Day. Use as instructed 100 each 12 Taking   • insulin glargine (LANTUS) 100 UNIT/ML injection Inject 95 Units under the skin Every 12 (Twelve) Hours. 1 each 11 Taking   • Insulin Lispro (HUMALOG KWIKPEN) 100 UNIT/ML solution  pen-injector Inject 45 Units under the skin 3 (Three) Times a Day Before Meals. 60 mL 11 Taking   • Insulin Pen Needle (NOVOFINE) 30G X 8 MM misc As directed 4 times daily 100 each 11 Taking   • ipratropium-albuterol (DUO-NEB) 0.5-2.5 mg/mL nebulizer Take 3 mL by nebulization 4 (Four) Times a Day. ICD10 code J96.01 360 mL 0 Taking   • lisinopril (PRINIVIL,ZESTRIL) 10 MG tablet TAKE 1 TABLET BY MOUTH DAILY. 30 tablet 3 Taking   • metFORMIN (GLUMETZA) 1000 MG (MOD) 24 hr tablet Take 1 tablet by mouth 2 (Two) Times a Day With Meals. 60 tablet 3    • metoprolol tartrate (LOPRESSOR) 50 MG tablet Take 1 tablet by mouth 2 (Two) Times a Day. 60 tablet 3 Taking   • montelukast (SINGULAIR) 10 MG tablet Take 1 tablet by mouth Every Night. 30 tablet 3    • naproxen (NAPROSYN) 500 MG tablet Take 1 tablet by mouth 2 (Two) Times a Day With Meals. 60 tablet 3 Taking   • nitroglycerin (NITROSTAT) 0.4 MG SL tablet Place 1 tablet under the tongue As Needed for Chest Pain. Take no more than 3 doses in 15 minutes. 30 tablet 3    • pantoprazole (PROTONIX) 40 MG EC tablet Take 1 tablet by mouth Daily. 30 tablet 3    • predniSONE (DELTASONE) 20 MG tablet Take 2 tablets by mouth Daily. 6 tablet 0 Taking   • promethazine (PHENERGAN) 25 MG tablet Take 1 tablet by mouth Every 6 (Six) Hours As Needed for Nausea or Vomiting. 30 tablet 0        ALLERGIES:  Other    REVIEW OF SYSTEMS  Review of Systems   Gastrointestinal: Negative for bowel incontinence.   Genitourinary: Negative for hematuria.   Neurological: Negative for tingling and loss of consciousness.       PHYSICAL EXAM:  Temp:  [96.5 °F (35.8 °C)-97.9 °F (36.6 °C)] 96.5 °F (35.8 °C)  Heart Rate:  [78-93] 89  Resp:  [18-20] 19  BP: ()/(52-80) 119/80  Body mass index is 51.21 kg/(m^2).  Physical Exam    DIAGNOSTIC DATA:   Lab Results (last 24 hours)     Procedure Component Value Units Date/Time    CK [[id number]]  (Normal) Collected:  04/16/17 0938    Specimen:  Blood Updated:  04/16/17  0957     Creatine Kinase 31 U/L     Comprehensive Metabolic Panel [[id number]]  (Abnormal) Collected:  04/16/17 0938    Specimen:  Blood Updated:  04/16/17 0957     Glucose 265 (H) mg/dL      BUN 40 (H) mg/dL      Creatinine 1.67 (H) mg/dL      Sodium 135 (L) mmol/L      Potassium 3.8 mmol/L      Chloride 98 mmol/L      CO2 25.0 mmol/L      Calcium 8.6 mg/dL      Total Protein 6.9 g/dL      Albumin 3.50 g/dL      ALT (SGPT) 28 U/L      AST (SGOT) 34 U/L      Alkaline Phosphatase 135 (H) U/L      Total Bilirubin 0.4 mg/dL      eGFR Non African Amer 32 (L) mL/min/1.73      Globulin 3.4 gm/dL      A/G Ratio 1.0 (L) g/dL      BUN/Creatinine Ratio 24.0     Anion Gap 12.0 mmol/L     Hemoglobin A1c [[id number]]  (Abnormal) Collected:  04/16/17 0938    Specimen:  Blood Updated:  04/16/17 1001     Hemoglobin A1C 12.86 (H) %     CK-MB [[id number]]  (Normal) Collected:  04/16/17 0938    Specimen:  Blood Updated:  04/16/17 1007     CKMB 0.60 ng/mL     BNP [[id number]]  (Normal) Collected:  04/16/17 0938    Specimen:  Blood Updated:  04/16/17 1007     proBNP 267.0 pg/mL     Iron Profile [[id number]]  (Abnormal) Collected:  04/16/17 0938    Specimen:  Blood Updated:  04/16/17 1009     Iron 28 (L) mcg/dL      TIBC 389 mcg/dL      Iron Saturation 7 (L) %     Troponin [[id number]]  (Normal) Collected:  04/16/17 0938    Specimen:  Blood Updated:  04/16/17 1010     Troponin I <0.012 ng/mL     Ferritin [[id number]]  (Abnormal) Collected:  04/16/17 0938    Specimen:  Blood Updated:  04/16/17 1034     Ferritin 8.40 (L) ng/mL     Vitamin B12 [[id number]]  (Normal) Collected:  04/16/17 0938    Specimen:  Blood Updated:  04/16/17 1104     Vitamin B-12 316 pg/mL     Folate [[id number]]  (Normal) Collected:  04/16/17 0938    Specimen:  Blood Updated:  04/16/17 1104     Folate 12.20 ng/mL     POC Glucose Fingerstick [[id number]]  (Abnormal) Collected:  04/16/17 0959    Specimen:  Blood Updated:  04/16/17 1655     Glucose 273 (H) mg/dL       RN NotifiedMeter:  [id number]: [id number] LARRY PUENTE       POC Glucose Fingerstick [[id number]]  (Abnormal) Collected:  04/16/17 2023    Specimen:  Blood Updated:  04/16/17 2052     Glucose 262 (H) mg/dL       RN NotifiedMeter: [id number]: [id number] LEATHA CADET       Blood Culture [[id number]]  (Normal) Collected:  04/16/17 0938    Specimen:  Blood from Arm, Right Updated:  04/16/17 2201     Blood Culture No growth at less than 24 hours    Blood Culture [[id number]]  (Normal) Collected:  04/16/17 0938    Specimen:  Blood from Arm, Left Updated:  04/16/17 2201     Blood Culture No growth at less than 24 hours    Lactic Acid, Plasma [[id number]]  (Abnormal) Collected:  04/16/17 2334    Specimen:  Blood Updated:  04/17/17 0013     Lactate 2.2 (C) mmol/L     Blood Culture [[id number]]  (Normal) Collected:  04/16/17 0041    Specimen:  Blood from Arm, Left Updated:  04/17/17 0102     Blood Culture No growth at 24 hours    Blood Culture [[id number]]  (Normal) Collected:  04/16/17 0230    Specimen:  Blood from Arm, Right Updated:  04/17/17 0301     Blood Culture No growth at 24 hours    Urine Culture [[id number]]  (Abnormal) Collected:  04/16/17 0455    Specimen:  Urine from Urine, Catheter Updated:  04/17/17 0619     Urine Culture >100,000 CFU/mL Gram Negative Bacilli (A)     Beta Lactamase --    CBC (No Diff) [[id number]]  (Abnormal) Collected:  04/17/17 0556    Specimen:  Blood Updated:  04/17/17 0621     WBC 8.70 10*3/mm3      RBC 3.96 10*6/mm3      Hemoglobin 9.1 (L) g/dL      Hematocrit 29.6 (L) %      MCV 74.7 (L) fL      MCH 23.0 (L) pg      MCHC 30.7 (L) g/dL      RDW 19.1 (H) %      RDW-SD 51.9 (H) fl      MPV 8.7 fL      Platelets 271 10*3/mm3     Comprehensive Metabolic Panel [[id number]]  (Abnormal) Collected:  04/17/17 0556    Specimen:  Blood Updated:  04/17/17 0635     Glucose 188 (H) mg/dL      BUN 26 (H) mg/dL      Creatinine 1.07 (H) mg/dL      Sodium 137 mmol/L      Potassium 4.3 mmol/L      Chloride 106 mmol/L       CO2 20.0 (L) mmol/L      Calcium 8.8 mg/dL      Total Protein 6.5 g/dL      Albumin 3.20 (L) g/dL      ALT (SGPT) 27 U/L      AST (SGOT) 25 U/L      Alkaline Phosphatase 142 (H) U/L      Total Bilirubin 0.4 mg/dL      eGFR Non African Amer 53 mL/min/1.73      Globulin 3.3 gm/dL      A/G Ratio 1.0 (L) g/dL      BUN/Creatinine Ratio 24.3     Anion Gap 11.0 mmol/L     Respiratory Culture [[id number]] Collected:  04/16/17 1815    Specimen:  Sputum from Cough Updated:  04/17/17 0636     Respiratory Culture Culture in progress     Gram Stain Result Moderate (3+) WBCs per low power field      Rare (1+) Epithelial cells per low power field      Mixed bacterial nataly    POC Glucose Fingerstick [[id number]]  (Abnormal) Collected:  04/16/17 1629    Specimen:  Blood Updated:  04/17/17 0639     Glucose 173 (H) mg/dL       RN NotifiedMeter: [id number]: [id number] LARRY PUENTE       POC Glucose Fingerstick [[id number]]  (Abnormal) Collected:  04/17/17 0624    Specimen:  Blood Updated:  04/17/17 0641     Glucose 214 (H) mg/dL       Sliding Scale AdminMeter: [id number]: [id number] GEOVANNIAZIZA ZULUAGARA       C-reactive Protein [[id number]]  (Abnormal) Collected:  04/17/17 0556    Specimen:  Blood Updated:  04/17/17 0741     C-Reactive Protein 5.40 (H) mg/dL     T4, Free [[id number]]  (Normal) Collected:  04/16/17 0938    Specimen:  Blood Updated:  04/17/17 0756     Free T4 1.83 ng/dL            Imaging Results (last 24 hours)     Procedure Component Value Units Date/Time    US Carotid Bilateral [[id number]] Collected:  04/16/17 1056     Updated:  04/16/17 1127    Narrative:       Patient Name:  DALE FINK  Patient ID:  [id number]   Ordering:  SHELLEY ARAUJO  Attending:  SHELLEY ARAUJO  Referring:  SHELLEY ARAUJO  ------------------------------------------------  Procedure: Bilateral carotid duplex ultrasound    Reason for exam: Fall, dizziness.    FINDINGS: The right common carotid artery appears widely  patent  with peak systolic velocity of 96 cm/s. The right vertebral  artery appears widely patent with normal antegrade flow. Proximal  right internal carotid artery soft atherosclerotic plaque is  seen. This plaque does not seem hemodynamically significant.  Right peak ICA velocity is 91 cm/s. The right external carotid  artery appears widely patent with peak systolic velocity is 71  cm/s. The right ICA/CCA ratio is 0.95. The left common carotid  artery appears widely patent with peak systolic velocity of 90  cm/s. The left vertebral artery appears widely patent with normal  antegrade flow. Proximal left ICA vessels soft atherosclerotic  plaque which is causing abnormal elevation of the velocities  being 146 cm/s. The left external carotid artery appears widely  patent with peak systolic velocity of 105 cm/s. Left ICA to CCA  ratio is 1.62.      Impression:       1.  Proximal left internal carotid artery soft atherosclerotic  plaque causing 50-69% diameter stenosis.  2.  Otherwise unremarkable bilateral carotid duplex ultrasound.    Electronically signed by:  Young Johnson MD  4/16/2017 11:02 AM CDT  Workstation: [id number]    XR Ankle 3+ View Right [[id number]] Collected:  04/16/17 1433     Updated:  04/16/17 1436    Narrative:       Patient Name:  DALE FINK  Patient ID:  [id number]   Ordering:  SHELLEY ARAUJO  Attending:  SHELLEY ARAUJO  Referring:  SHELLEY ARAUJO  ------------------------------------------------  DATE OF EXAM: 4/16/2017 1:25 PM CDT    PROCEDURE: XR ANKLE 3 OR MORE VIEWS    INDICATION FOR PROCEDURE: 57 years old patient presents for  evaluation of right ankle pain.     TECHNIQUE:  Three views of the right ankle are submitted for  interpretation.    COMPARISON:  None.    FINDINGS:  The distal tibia and fibula have a normal appearance.  There are calcaneal spurs. Tarsal bones and visualized  metatarsals have grossly normal appearance. There are vascular  calcifications within the soft  tissues of the distal leg.      Impression:       Calcaneal spurs and soft tissue swelling.    Electronically signed by:  Meron Patricia MD  4/16/2017 2:35 PM CDT  Workstation: [employer]          JEFF reviewed the patient's new clinical results.    ASSESSMENT AND PLAN: This is a 57 y.o. female with:    Active Hospital Problems (** Indicates Principal Problem)    Diagnosis Date Noted   • **Sepsis due to urinary tract infection [A41.9, N39.0] 04/16/2017     Urine culture showed >100,000 CFU/mL Gram Negative Bacilli (A) - species pending  -Lactic Acid: 3.3 ->2.2, recheck this AM  -Continue IV fluids   - Rocephin day 2         • Hypotension [I95.9] 04/16/2017     - continue IV fluids   -Current SBP is in 113      • Fall [W19.XXXA] 04/16/2017     - No LOC, continue telemetry  - CT of head negative, will get MRI Brain without contrast to rule out stroke  - ECHO - read pending  - Carotid u/s: Proximal left internal carotid artery soft atherosclerotic  plaque causing 50-69% diameter stenosis.     • Acute kidney injury [N17.9] 04/16/2017     -Continue IV fluids  -Admission Creatinine 1.67, today 1.07  -Hold nephrotoxic drugs      • Anemia [D64.9] 04/16/2017     -Chronic  -B12, foalte normal,  low iron, low ferritin, normal TIBC     • Hypothyroidism [E03.9] 04/16/2017     -TSH is 5.280  -fT4 pending  - iron supplementation      • Diabetes mellitus type 2 in obese [E11.9, E66.9] 08/24/2016     -Insulin sliding scale   - Novolog 45u TID with meals  - Levemir 95u q12h  - A1C 12.86       • Morbid obesity with BMI of 50.0-59.9, adult [E66.01, Z68.43]      BMI 51.2, dietary consult ordered     • Carotid artery stenosis [I65.29]      Previously DWIGHT 0-15%, LICA 0-15%. LICA stent 5/2014.  - Repeat Carotid u/s 4/17/17: Proximal left internal carotid artery soft atherosclerotic plaque causing 50-69% diameter stenosis.        Resolved Hospital Problems    Diagnosis Date Noted Date Resolved   No resolved problems to display.         DVT  prophylaxis: Lovenox (adjusted for elevated creatinine)  Code status is Full Code   [facility] # [id number], reviewed and consistent with patient reported medications.      I discussed the patients findings and my recommendations with patient.      is the attending on record at time of admission, she is aware of the patient's status and agrees with the above history and physical.          This document has been electronically signed by Marci Jimenez MD on April 17, 2017 8:11 AM

## 2017-04-16 NOTE — ED PROVIDER NOTES
Subjective   HPI Comments: Patient blood sugar was very high about noontime EMS wanted to take her to the hospital but she refused.  About 6 PM she was confused and she fell and later on she fell again and blood pressure was very low she arrived here with her blood pressure 69/46      History provided by:  Patient      Review of Systems   Constitutional: Negative for activity change, appetite change, fatigue and fever.   HENT: Negative for congestion, facial swelling, mouth sores, nosebleeds, sore throat and trouble swallowing.    Eyes: Negative for discharge, redness and itching.   Respiratory: Negative for apnea, cough and wheezing.    Cardiovascular: Negative for chest pain and palpitations.   Gastrointestinal: Negative for blood in stool and nausea.   Endocrine: Negative for cold intolerance, heat intolerance, polydipsia, polyphagia and polyuria.   Genitourinary: Negative for difficulty urinating, dysuria, flank pain, frequency and hematuria.   Musculoskeletal: Negative for gait problem, joint swelling and neck pain.   Skin: Negative.  Negative for color change, pallor and rash.   Allergic/Immunologic: Negative for environmental allergies.   Neurological: Positive for speech difficulty, weakness and light-headedness. Negative for dizziness, seizures, syncope, numbness and headaches.   Hematological: Negative for adenopathy.   Psychiatric/Behavioral: Negative for agitation, behavioral problems, confusion and sleep disturbance. The patient is not nervous/anxious.        Past Medical History:   Diagnosis Date   • Anxiety states    • Asthma    • Candidiasis of mouth    • Candidiasis of vulva and vagina    • Carotid artery stenosis     DWIGHT 0-15%, LICA 0-15%. LICA stent 5/2014.   • Chest pain    • Chronic folliculitis    • Chronic obstructive lung disease    • Coronary arteriosclerosis    • Diabetes mellitus     no retinopathy; A1C 9.1      • Dyslipidemia    • Dysphagia    • Encounter for general adult medical  examination with abnormal findings    • Essential hypertension    • Excoriated eczema     asteosis/keratosis   • Furuncle of neck    • GERD (gastroesophageal reflux disease)    • History of colon polyps    • Hypertensive disorder    • Infection of skin and subcutaneous tissue     rt perineal abscess   • Infestation by Sarcoptes scabiei deepak hominis    • Kidney stone    • Leukocytosis    • Lower limb anomaly     Abscess of lower limb - ANTX causing n/v      • Mixed hyperlipidemia    • Morbid obesity due to excess calories     BMI 44.5   • Need for vaccination    • Neurologic disorder associated with diabetes mellitus    • Non-healing surgical wound     LLE EVHa   • Pain     LLE   • Peripheral vascular disease    • Rash    • Shoulder joint pain    • Sleep apnea    • Surgical follow-up care     5/27/14 L carotid stent   • Tobacco dependence syndrome     1/2ppd      • Type 2 diabetes mellitus    • Viral wart     RIGHT middle phalanx   • Vitamin D deficiency        Allergies   Allergen Reactions   • Other      Bandaids, MRSA, SURECLOSE       Past Surgical History:   Procedure Laterality Date   • CARDIAC CATHETERIZATION  08/09/2013    Severe multivessel CAD with critical lesions noted in the RCA, obtuse marginal two, ramus intermemdious, and LAD as described above. Normal LV systolic function with no wall motion abnormality.    • CARDIAC CATHETERIZATION  05/27/2014     LEFT Carotid Stent    • COLONOSCOPY  05/02/2016    Normal colon.Diverticulosis in the sigmoid colon.No specimens collected.    • CORONARY ARTERY BYPASS GRAFT  11/15/2013    CABG x 3 with LIMA to LAD and coronary endarterectomy to LAD, SVG to Ramus intermedius branch and SVG to PDA.    • ENDOSCOPY  09/23/2015     Esophageal stricture present.Normal stomach.Normal duodenum.    • ENDOSCOPY  11/16/2015    w/ dilatation - Esophageal stricture present,Dilatation performed.Normal stomach and duodenum.    • INCISION AND DRAINAGE ABSCESS  01/02/2016    Incision and  drainage of right perineal abscess.    • INCISION AND DRAINAGE ABSCESS  02/18/2014    Incision and Drainage of abscess of left lower leg    • OTHER SURGICAL HISTORY  01/25/2016    DESTRUCTION OF BENIGN LESION      • OTHER SURGICAL HISTORY  04/18/2014    ear/neck - L attempted CEA, Neck Dissection        Family History   Problem Relation Age of Onset   • Coronary artery disease Other    • Diabetes Other    • Heart disease Other    • Hypertension Other        Social History     Social History   • Marital status:      Spouse name: N/A   • Number of children: N/A   • Years of education: N/A     Social History Main Topics   • Smoking status: Current Every Day Smoker     Types: Cigarettes   • Smokeless tobacco: None   • Alcohol use No   • Drug use: No   • Sexual activity: Not Asked     Other Topics Concern   • None     Social History Narrative           Objective   Physical Exam   Constitutional: She appears well-developed and well-nourished.   HENT:   Head: Normocephalic and atraumatic.   Nose: Nose normal.   Mouth/Throat: Oropharynx is clear and moist.   Eyes: Conjunctivae and EOM are normal. Pupils are equal, round, and reactive to light.   Neck: Normal range of motion. Neck supple.   Cardiovascular: Normal rate, regular rhythm, normal heart sounds and intact distal pulses.    Pulmonary/Chest: Effort normal and breath sounds normal.   Abdominal: Soft. Bowel sounds are normal.   Musculoskeletal: Normal range of motion.   Neurological:   Patient lethargic breathing fast nonresponsive to commands   Skin: Skin is warm and dry.   Nursing note and vitals reviewed.      ECG 12 Lead    Date/Time: 4/16/2017 12:56 AM  Performed by: LEONILA LINDSEY  Authorized by: LEONILA LINDSEY   Interpreted by physician  Comparison: not compared with previous ECG   Rhythm: sinus rhythm  Other findings: prolonged QTc interval  Clinical impression: abnormal ECG               ED Course  ED Course   Comment By Time   About noontime her  blood sugar was high, she arrived with a very low blood pressure, 69/46 Angel Hernandez MD 04/16 0107   About 6 PM she fell first time, she had been with a very low blood pressure and incoherent Angel Hernandez MD 04/16 0107   Patient is very lethargic and tachypneic Angel Hernandez MD 04/16 0107   Resident for about patient condition for admission Angel Hernandez MD 04/16 0119          Labs Reviewed   COMPREHENSIVE METABOLIC PANEL - Abnormal; Notable for the following:        Result Value    Glucose 234 (*)     BUN 43 (*)     Creatinine 2.17 (*)     Sodium 133 (*)     Chloride 92 (*)     Alkaline Phosphatase 166 (*)     eGFR Non  Amer 23 (*)     Anion Gap 18.0 (*)     All other components within normal limits   LACTIC ACID, PLASMA - Abnormal; Notable for the following:     Lactate 3.3 (*)     All other components within normal limits   C-REACTIVE PROTEIN - Abnormal; Notable for the following:     C-Reactive Protein 4.90 (*)     All other components within normal limits   CBC WITH AUTO DIFFERENTIAL - Abnormal; Notable for the following:     WBC 14.42 (*)     Hemoglobin 10.8 (*)     Hematocrit 33.2 (*)     MCV 72.3 (*)     MCH 23.5 (*)     RDW 18.2 (*)     RDW-SD 47.7 (*)     Immature Grans % 0.6 (*)     Neutrophils, Absolute 9.50 (*)     Immature Grans, Absolute 0.09 (*)     All other components within normal limits   TSH - Abnormal; Notable for the following:     TSH 5.280 (*)     All other components within normal limits   BLOOD GAS, ARTERIAL - Abnormal; Notable for the following:     Hemoglobin, Blood Gas 10.4 (*)     Hematocrit, Blood Gas 31.0 (*)     Sodium, Arterial 132.5 (*)     All other components within normal limits   CK - Abnormal; Notable for the following:     Creatine Kinase 26 (*)     All other components within normal limits   CK - Abnormal; Notable for the following:     Creatine Kinase 25 (*)     All other components within normal limits   POCT GLUCOSE FINGERSTICK - Abnormal; Notable for  the following:     Glucose 259 (*)     All other components within normal limits   POCT GLUCOSE FINGERSTICK - Abnormal; Notable for the following:     Glucose 259 (*)     All other components within normal limits   PROTIME-INR - Normal    Narrative:     Therapeutic range for most indications is 2.0-3.0 INR,  or 2.5-3.5 for mechanical heart valves.   APTT - Normal    Narrative:     The recommended Heparin therapeutic range is 68-97 seconds.   MAGNESIUM - Normal   PHOSPHORUS - Normal   CALCIUM, IONIZED - Normal   LIPASE - Normal   BNP (IN-HOUSE) - Normal   TROPONIN (IN-HOUSE) - Normal   TROPONIN (IN-HOUSE) - Normal   CK MB - Normal   CK MB - Normal   ACETONE - Normal   BLOOD CULTURE   BLOOD CULTURE   MRSA SCREEN CULTURE   CRE SCREEN BY PCR   RAINBOW DRAW    Narrative:     The following orders were created for panel order [place] Draw.  Procedure                               Abnormality         Status                     ---------                               -----------         ------                     Light Blue Top[[id number]]                                    In process                 Green Top (Gel)[[id number]]                                   In process                 Lavender Top[[id number]]                                      In process                 Gold Top - SST[[id number]]                                    In process                   Please view results for these tests on the individual orders.   URINALYSIS W/ CULTURE IF INDICATED   LACTATE ACID, REFLEX   TROPONIN (IN-HOUSE)   CK   CK MB   BNP (IN-HOUSE)   HEMOGLOBIN A1C   CBC (NO DIFF)   CBC (NO DIFF)   COMPREHENSIVE METABOLIC PANEL   COMPREHENSIVE METABOLIC PANEL   CBC AND DIFFERENTIAL    Narrative:     The following orders were created for panel order CBC & Differential.  Procedure                               Abnormality         Status                     ---------                               -----------         ------                     CBC  Auto Differential[[id number]]         Abnormal            Final result                 Please view results for these tests on the individual orders.   KETONE BODIES SERUM    Narrative:     The following orders were created for panel order Ketone Bodies, Serum.  Procedure                               Abnormality         Status                     ---------                               -----------         ------                     Acetone[[id number]]                       Normal              Final result                 Please view results for these tests on the individual orders.   LIGHT BLUE TOP   GREEN TOP   LAVENDER TOP   GOLD TOP - SST        CT Head Without Contrast   Final Result   Mild atrophy and minimal chronic small vessel   ischemic changes with no acute intracranial abnormality.      Electronically signed by:  Eusebio Sargent  4/16/2017 3:16 AM   [employer] Workstation: [id number]      XR Chest 1 View   Final Result   No acute disease.      Electronically signed by:  Eusebio Sargent  4/16/2017 1:33 AM   CDT Workstation: [id number]                [facility]    Final diagnoses:   Hypotension, unspecified hypotension type   Sepsis, due to unspecified organism   Leukocytosis, unspecified type   Lactic acid acidosis   Elevated BUN   Creatinine elevation   Lethargic   Hyperglycemia            Angel Hernandez MD  04/16/17 0421

## 2017-04-17 ENCOUNTER — APPOINTMENT (OUTPATIENT)
Dept: GENERAL RADIOLOGY | Facility: HOSPITAL | Age: 57
End: 2017-04-17

## 2017-04-17 ENCOUNTER — APPOINTMENT (OUTPATIENT)
Dept: MRI IMAGING | Facility: HOSPITAL | Age: 57
End: 2017-04-17

## 2017-04-17 PROBLEM — N30.80 BACTERIAL CYSTITIS: Status: ACTIVE | Noted: 2017-04-17

## 2017-04-17 PROBLEM — N39.0 SEPSIS DUE TO URINARY TRACT INFECTION: Status: ACTIVE | Noted: 2017-04-16

## 2017-04-17 PROBLEM — A41.50 SEPSIS DUE TO GRAM NEGATIVE BACTERIA: Status: ACTIVE | Noted: 2017-04-16

## 2017-04-17 LAB
ALBUMIN SERPL-MCNC: 3.2 G/DL (ref 3.4–4.8)
ALBUMIN/GLOB SERPL: 1 G/DL (ref 1.1–1.8)
ALP SERPL-CCNC: 142 U/L (ref 38–126)
ALT SERPL W P-5'-P-CCNC: 27 U/L (ref 9–52)
ANION GAP SERPL CALCULATED.3IONS-SCNC: 11 MMOL/L (ref 5–15)
AST SERPL-CCNC: 25 U/L (ref 14–36)
BILIRUB SERPL-MCNC: 0.4 MG/DL (ref 0.2–1.3)
BUN BLD-MCNC: 26 MG/DL (ref 7–21)
BUN/CREAT SERPL: 24.3 (ref 7–25)
CALCIUM SPEC-SCNC: 8.8 MG/DL (ref 8.4–10.2)
CHLORIDE SERPL-SCNC: 106 MMOL/L (ref 95–110)
CO2 SERPL-SCNC: 20 MMOL/L (ref 22–31)
CREAT BLD-MCNC: 1.07 MG/DL (ref 0.5–1)
CRP SERPL-MCNC: 5.4 MG/DL (ref 0–1)
D-LACTATE SERPL-SCNC: 2.2 MMOL/L (ref 0.5–2)
D-LACTATE SERPL-SCNC: 3.4 MMOL/L (ref 0.5–2)
DEPRECATED RDW RBC AUTO: 51.9 FL (ref 36.4–46.3)
ERYTHROCYTE [DISTWIDTH] IN BLOOD BY AUTOMATED COUNT: 19.1 % (ref 11.5–14.5)
GFR SERPL CREATININE-BSD FRML MDRD: 53 ML/MIN/1.73 (ref 51–120)
GLOBULIN UR ELPH-MCNC: 3.3 GM/DL (ref 2.3–3.5)
GLUCOSE BLD-MCNC: 188 MG/DL (ref 60–100)
GLUCOSE BLDC GLUCOMTR-MCNC: 148 MG/DL (ref 70–130)
GLUCOSE BLDC GLUCOMTR-MCNC: 173 MG/DL (ref 70–130)
GLUCOSE BLDC GLUCOMTR-MCNC: 214 MG/DL (ref 70–130)
GLUCOSE BLDC GLUCOMTR-MCNC: 215 MG/DL (ref 70–130)
GLUCOSE BLDC GLUCOMTR-MCNC: 355 MG/DL (ref 70–130)
HCT VFR BLD AUTO: 29.6 % (ref 35–45)
HGB BLD-MCNC: 9.1 G/DL (ref 12–15.5)
MCH RBC QN AUTO: 23 PG (ref 26.5–34)
MCHC RBC AUTO-ENTMCNC: 30.7 G/DL (ref 31.4–36)
MCV RBC AUTO: 74.7 FL (ref 80–98)
PLATELET # BLD AUTO: 271 10*3/MM3 (ref 150–450)
PMV BLD AUTO: 8.7 FL (ref 8–12)
POTASSIUM BLD-SCNC: 4.3 MMOL/L (ref 3.5–5.1)
PROT SERPL-MCNC: 6.5 G/DL (ref 6.3–8.6)
RBC # BLD AUTO: 3.96 10*6/MM3 (ref 3.77–5.16)
SODIUM BLD-SCNC: 137 MMOL/L (ref 137–145)
T4 FREE SERPL-MCNC: 1.83 NG/DL (ref 0.78–2.19)
TROPONIN I SERPL-MCNC: 0.03 NG/ML
TROPONIN I SERPL-MCNC: 0.05 NG/ML
TROPONIN I SERPL-MCNC: <0.012 NG/ML
WBC NRBC COR # BLD: 8.7 10*3/MM3 (ref 3.2–9.8)

## 2017-04-17 PROCEDURE — 71010 HC CHEST PA OR AP: CPT

## 2017-04-17 PROCEDURE — 93010 ELECTROCARDIOGRAM REPORT: CPT | Performed by: INTERNAL MEDICINE

## 2017-04-17 PROCEDURE — 82962 GLUCOSE BLOOD TEST: CPT

## 2017-04-17 PROCEDURE — 84484 ASSAY OF TROPONIN QUANT: CPT | Performed by: FAMILY MEDICINE

## 2017-04-17 PROCEDURE — 63710000001 INSULIN DETEMIR PER 5 UNITS: Performed by: FAMILY MEDICINE

## 2017-04-17 PROCEDURE — 97116 GAIT TRAINING THERAPY: CPT

## 2017-04-17 PROCEDURE — G8979 MOBILITY GOAL STATUS: HCPCS

## 2017-04-17 PROCEDURE — 97162 PT EVAL MOD COMPLEX 30 MIN: CPT

## 2017-04-17 PROCEDURE — 94760 N-INVAS EAR/PLS OXIMETRY 1: CPT

## 2017-04-17 PROCEDURE — 63710000001 INSULIN ASPART PER 5 UNITS: Performed by: FAMILY MEDICINE

## 2017-04-17 PROCEDURE — 86140 C-REACTIVE PROTEIN: CPT | Performed by: FAMILY MEDICINE

## 2017-04-17 PROCEDURE — 99232 SBSQ HOSP IP/OBS MODERATE 35: CPT | Performed by: FAMILY MEDICINE

## 2017-04-17 PROCEDURE — 83605 ASSAY OF LACTIC ACID: CPT | Performed by: EMERGENCY MEDICINE

## 2017-04-17 PROCEDURE — G8978 MOBILITY CURRENT STATUS: HCPCS

## 2017-04-17 PROCEDURE — 80053 COMPREHEN METABOLIC PANEL: CPT | Performed by: FAMILY MEDICINE

## 2017-04-17 PROCEDURE — 97530 THERAPEUTIC ACTIVITIES: CPT

## 2017-04-17 PROCEDURE — 63710000001 PROMETHAZINE PER 25 MG: Performed by: FAMILY MEDICINE

## 2017-04-17 PROCEDURE — 93005 ELECTROCARDIOGRAM TRACING: CPT | Performed by: FAMILY MEDICINE

## 2017-04-17 PROCEDURE — 85027 COMPLETE CBC AUTOMATED: CPT | Performed by: FAMILY MEDICINE

## 2017-04-17 PROCEDURE — 25010000002 CEFTRIAXONE: Performed by: FAMILY MEDICINE

## 2017-04-17 PROCEDURE — 25010000002 ENOXAPARIN PER 10 MG: Performed by: FAMILY MEDICINE

## 2017-04-17 PROCEDURE — 94799 UNLISTED PULMONARY SVC/PX: CPT

## 2017-04-17 RX ORDER — NITROGLYCERIN 0.4 MG/1
0.4 TABLET SUBLINGUAL
Status: DISCONTINUED | OUTPATIENT
Start: 2017-04-17 | End: 2017-04-18 | Stop reason: HOSPADM

## 2017-04-17 RX ADMIN — CEFTRIAXONE 1 G: 1 INJECTION, POWDER, FOR SOLUTION INTRAMUSCULAR; INTRAVENOUS at 09:36

## 2017-04-17 RX ADMIN — CLOPIDOGREL BISULFATE 75 MG: 75 TABLET ORAL at 09:39

## 2017-04-17 RX ADMIN — IPRATROPIUM BROMIDE AND ALBUTEROL SULFATE 3 ML: 2.5; .5 SOLUTION RESPIRATORY (INHALATION) at 20:15

## 2017-04-17 RX ADMIN — SODIUM CHLORIDE 500 ML: 9 INJECTION, SOLUTION INTRAVENOUS at 08:03

## 2017-04-17 RX ADMIN — GABAPENTIN 800 MG: 400 CAPSULE ORAL at 21:06

## 2017-04-17 RX ADMIN — SODIUM CHLORIDE 125 ML/HR: 900 INJECTION, SOLUTION INTRAVENOUS at 08:50

## 2017-04-17 RX ADMIN — CLOBETASOL PROPIONATE: 0.5 CREAM TOPICAL at 21:08

## 2017-04-17 RX ADMIN — IPRATROPIUM BROMIDE AND ALBUTEROL SULFATE 3 ML: 2.5; .5 SOLUTION RESPIRATORY (INHALATION) at 07:16

## 2017-04-17 RX ADMIN — ATORVASTATIN CALCIUM 20 MG: 20 TABLET, FILM COATED ORAL at 09:40

## 2017-04-17 RX ADMIN — CLOBETASOL PROPIONATE: 0.5 CREAM TOPICAL at 09:40

## 2017-04-17 RX ADMIN — METOPROLOL TARTRATE 50 MG: 50 TABLET ORAL at 17:25

## 2017-04-17 RX ADMIN — INSULIN ASPART 45 UNITS: 100 INJECTION, SOLUTION INTRAVENOUS; SUBCUTANEOUS at 09:12

## 2017-04-17 RX ADMIN — HYDROCODONE BITARTRATE AND ACETAMINOPHEN 1 TABLET: 7.5; 325 TABLET ORAL at 01:33

## 2017-04-17 RX ADMIN — FERROUS SULFATE TAB EC 324 MG (65 MG FE EQUIVALENT) 324 MG: 324 (65 FE) TABLET DELAYED RESPONSE at 09:13

## 2017-04-17 RX ADMIN — INSULIN ASPART 45 UNITS: 100 INJECTION, SOLUTION INTRAVENOUS; SUBCUTANEOUS at 17:30

## 2017-04-17 RX ADMIN — CYCLOBENZAPRINE HYDROCHLORIDE 10 MG: 10 TABLET, FILM COATED ORAL at 17:25

## 2017-04-17 RX ADMIN — GABAPENTIN 800 MG: 400 CAPSULE ORAL at 09:39

## 2017-04-17 RX ADMIN — PROMETHAZINE HYDROCHLORIDE 25 MG: 25 TABLET ORAL at 12:25

## 2017-04-17 RX ADMIN — INSULIN DETEMIR 95 UNITS: 100 INJECTION, SOLUTION SUBCUTANEOUS at 09:57

## 2017-04-17 RX ADMIN — IPRATROPIUM BROMIDE AND ALBUTEROL SULFATE 3 ML: 2.5; .5 SOLUTION RESPIRATORY (INHALATION) at 16:36

## 2017-04-17 RX ADMIN — INSULIN ASPART 45 UNITS: 100 INJECTION, SOLUTION INTRAVENOUS; SUBCUTANEOUS at 12:21

## 2017-04-17 RX ADMIN — CETIRIZINE HYDROCHLORIDE 5 MG: 5 TABLET, FILM COATED ORAL at 09:39

## 2017-04-17 RX ADMIN — SODIUM CHLORIDE 125 ML/HR: 900 INJECTION, SOLUTION INTRAVENOUS at 01:27

## 2017-04-17 RX ADMIN — SODIUM CHLORIDE 1000 ML: 9 INJECTION, SOLUTION INTRAVENOUS at 15:36

## 2017-04-17 RX ADMIN — CYCLOBENZAPRINE HYDROCHLORIDE 10 MG: 10 TABLET, FILM COATED ORAL at 09:39

## 2017-04-17 RX ADMIN — METOPROLOL TARTRATE 50 MG: 50 TABLET ORAL at 09:39

## 2017-04-17 RX ADMIN — HYDROCODONE BITARTRATE AND ACETAMINOPHEN 1 TABLET: 7.5; 325 TABLET ORAL at 11:09

## 2017-04-17 RX ADMIN — MONTELUKAST SODIUM 10 MG: 10 TABLET, FILM COATED ORAL at 21:06

## 2017-04-17 RX ADMIN — ENOXAPARIN SODIUM 30 MG: 30 INJECTION SUBCUTANEOUS at 09:35

## 2017-04-17 RX ADMIN — PANTOPRAZOLE SODIUM 40 MG: 40 TABLET, DELAYED RELEASE ORAL at 09:39

## 2017-04-17 RX ADMIN — ASPIRIN 325 MG: 325 TABLET, COATED ORAL at 08:49

## 2017-04-17 RX ADMIN — GABAPENTIN 800 MG: 400 CAPSULE ORAL at 15:36

## 2017-04-17 NOTE — PLAN OF CARE
Problem: Inpatient Physical Therapy  Goal: Strength Goal LTG- PT  Outcome: Ongoing (interventions implemented as appropriate)    04/17/17 1018 04/17/17 1330   Strength Goal PT LTG   Strength Goal PT LTG, Date Established 04/17/17 --    Strength Goal PT LTG, Time to Achieve by discharge --    Strength Goal PT LTG, Measure to Achieve MMT 4+/5 BLEs. --    Strength Goal PT LTG, Functional Goal To improve standing tolerance.  --    Strength Goal PT LTG, Date Goal Reviewed --  04/17/17   Strength Goal PT LTG, Outcome --  goal ongoing       Goal: Physical Therapy Goal LTG- PT  Outcome: Ongoing (interventions implemented as appropriate)    04/17/17 1018 04/17/17 1330   Physical Therapy PT LTG   Physical Therapy PT LTG, Date Established 04/17/17 --    Physical Therapy PT LTG, Time to Achieve by discharge --    Physical Therapy PT LTG, Activity Type Tinetti fall risk assessment --    Physical Therapy PT LTG, Addisional Goal Score >/=24/28 or low fall risk.  --    Physical Therapy PT LTG, Date Goal Reviewed --  04/17/17   Physical Therapy PT LTG, Outcome --  goal ongoing       Goal: Gait Training Goal STG- PT  Outcome: Ongoing (interventions implemented as appropriate)    04/17/17 1018 04/17/17 1330   Gait Training PT STG   Gait Training Goal PT STG, Date Established 04/17/17 --    Gait Training Goal PT STG, Time to Achieve 5 - 7 days --    Gait Training Goal PT STG, [place] Level conditional independence --    Gait Training Goal PT STG, Assist Device walker, rolling --    Gait Training Goal PT STG, Distance to Achieve 150'x1 --    Gait Training Goal PT STG, Date Goal Reviewed --  04/17/17   Gait Training Goal PT STG, Outcome --  goal ongoing         Problem: Patient Care Overview (Adult)  Goal: Plan of Care Review  Outcome: Ongoing (interventions implemented as appropriate)    04/17/17 1330   Coping/Psychosocial Response Interventions   Plan Of Care Reviewed With patient   Outcome Evaluation   Outcome Summary/Follow up  Plan Patient alert and cooperative. Patient reports 6/10 R ankle pain at rest. However, she performs standing ther ex without complaint. Patient demonstrated no S/S of distress, though her HR dropped to 51 after standing ther ex. Nurse notified.

## 2017-04-17 NOTE — PLAN OF CARE
Problem: Sepsis (Adult)  Goal: Signs and Symptoms of Listed Potential Problems Will be Absent or Manageable (Sepsis)  Outcome: Ongoing (interventions implemented as appropriate)    Problem: Diabetes, Type 2 (Adult)  Goal: Signs and Symptoms of Listed Potential Problems Will be Absent or Manageable (Diabetes, Type 2)  Outcome: Ongoing (interventions implemented as appropriate)    Problem: Patient Care Overview (Adult)  Goal: Plan of Care Review  Outcome: Ongoing (interventions implemented as appropriate)    04/17/17 3020   Coping/Psychosocial Response Interventions   Plan Of Care Reviewed With patient   Outcome Evaluation   Outcome Summary/Follow up Plan pt alert and cooperative, pt napped frequently this afternoon, no voiced needs       Goal: Adult Individualization and Mutuality  Outcome: Ongoing (interventions implemented as appropriate)  Goal: Discharge Needs Assessment  Outcome: Ongoing (interventions implemented as appropriate)

## 2017-04-17 NOTE — PROGRESS NOTES
FAMILY MEDICINE DAILY PROGRESS NOTE  NAME: Yamileth Slater  : 1960  MRN: [id number]     LOS: 1 day     PROVIDER OF SERVICE: Yadira Delarosa MD    Chief Complaint: Sepsis due to urinary tract infection    Subjective:     Interval History:  History taken from: patient  Patient reports she is feeling ok today, she has had no further falls.  States she does not remember hitting her head at home but was told by her niece that she did hit her head.      Review of Systems:   Review of Systems   Constitutional: Negative for activity change, appetite change, chills, fatigue and fever.   HENT: Negative for congestion, postnasal drip, rhinorrhea and sinus pressure.    Eyes: Negative for photophobia and visual disturbance.   Respiratory: Negative for cough, shortness of breath and wheezing.    Cardiovascular: Negative for chest pain and palpitations.   Gastrointestinal: Negative for abdominal pain, blood in stool, constipation, diarrhea, nausea and vomiting.   Endocrine: Negative for polydipsia, polyphagia and polyuria.   Genitourinary: Negative for difficulty urinating, dysuria and urgency.   Skin: Negative for color change and rash.   Neurological: Positive for weakness. Negative for dizziness, facial asymmetry, speech difficulty and headaches.   Psychiatric/Behavioral: Negative for decreased concentration. The patient is not nervous/anxious.        Objective:     Vital Signs  Temp:  [96.5 °F (35.8 °C)-97.9 °F (36.6 °C)] 96.5 °F (35.8 °C)  Heart Rate:  [70-93] 86  Resp:  [18-20] 20  BP: ()/(52-80) 119/80   Body mass index is 51.21 kg/(m^2).    Physical Exam  Physical Exam   Constitutional: She is oriented to person, place, and time. She appears well-developed and well-nourished.   HENT:   Head: Normocephalic and atraumatic.   Nose: Nose normal.   Mouth/Throat: Oropharynx is clear and moist.   Eyes: Conjunctivae and EOM are normal. Pupils are equal, round, and reactive to light.   Neck: Normal range of  motion. Neck supple.   Cardiovascular: Normal rate, regular rhythm, normal heart sounds and intact distal pulses.    Pulmonary/Chest: Effort normal. No respiratory distress. She has decreased breath sounds. She has no wheezes.   Abdominal: Soft. Bowel sounds are normal. She exhibits no distension. There is no tenderness.   Musculoskeletal: Normal range of motion.   Neurological: She is alert and oriented to person, place, and time.   Skin: Skin is warm and dry.   Psychiatric: She has a normal mood and affect. Her behavior is normal.   Vitals reviewed.      Medication Review    Current Facility-Administered Medications:   •  aspirin tablet 325 mg, 325 mg, Oral, Once, Angel Hernandez MD  •  atorvastatin (LIPITOR) tablet 20 mg, 20 mg, Oral, Daily, Marci Jimenez MD, 20 mg at 04/16/17 1137  •  budesonide-formoterol (SYMBICORT) 160-4.5 MCG/ACT inhaler 2 puff, 2 puff, Inhalation, BID - RT, Marci Jimenez MD  •  cefTRIAXone (ROCEPHIN) 1 g/100 mL 0.9% NS (MBP), 1 g, Intravenous, Q24H, Marci Jimenez MD, 1 g at 04/16/17 0941  •  cetirizine (zyrTEC) tablet 5 mg, 5 mg, Oral, Daily, Marci Jimenez MD, 5 mg at 04/16/17 1137  •  clobetasol (TEMOVATE) 0.05 % cream, , Topical, Q12H, Marci Jimenez MD  •  clopidogrel (PLAVIX) tablet 75 mg, 75 mg, Oral, Daily, Marci Jimenez MD, 75 mg at 04/16/17 0833  •  cyclobenzaprine (FLEXERIL) tablet 10 mg, 10 mg, Oral, BID, Marci Jimenez MD, 10 mg at 04/16/17 1656  •  enoxaparin (LOVENOX) syringe 30 mg, 30 mg, Subcutaneous, Daily, Marci Jimenez MD, 30 mg at 04/16/17 0833  •  ferrous sulfate EC tablet 324 mg, 324 mg, Oral, Daily With Breakfast, Marci Jimenez MD  •  gabapentin (NEURONTIN) capsule 800 mg, 800 mg, Oral, TID, Marci Jimenez MD, 800 mg at 04/16/17 5579  •  HYDROcodone-acetaminophen (NORCO) 7.5-325 MG per tablet 1 tablet, 1 tablet, Oral, Q6H PRN, Marci Jimenez MD, 1 tablet at 04/17/17 0132  •  insulin aspart  (novoLOG) injection 45 Units, 45 Units, Subcutaneous, TID With Meals, Marci Jimenez MD, 45 Units at 04/16/17 1811  •  insulin detemir (LEVEMIR) injection 95 Units, 95 Units, Subcutaneous, Q12H, Yadira Delarosa MD  •  ipratropium-albuterol (DUO-NEB) nebulizer solution 3 mL, 3 mL, Nebulization, 4x Daily, Marci Jimenez MD, 3 mL at 04/16/17 1950  •  metoprolol tartrate (LOPRESSOR) tablet 50 mg, 50 mg, Oral, BID, Marci Jimenez MD  •  montelukast (SINGULAIR) tablet 10 mg, 10 mg, Oral, Nightly, Marci Jimenez MD, 10 mg at 04/16/17 2145  •  nitroglycerin (NITROSTAT) SL tablet 0.4 mg, 0.4 mg, Sublingual, PRN, Marci Jimenez MD  •  pantoprazole (PROTONIX) EC tablet 40 mg, 40 mg, Oral, Daily, Marci Jimenez MD, 40 mg at 04/16/17 0833  •  promethazine (PHENERGAN) tablet 25 mg, 25 mg, Oral, Q6H PRN, Marci Jimenez MD  •  sodium chloride 0.9 % bolus 500 mL, 500 mL, Intravenous, Once, Yadira Delarosa MD  •  sodium chloride 0.9 % flush 1-10 mL, 1-10 mL, Intravenous, PRN, Marci Jimenez MD  •  sodium chloride 0.9 % flush 10 mL, 10 mL, Intravenous, PRN, Angel Hernandez MD  •  sodium chloride 0.9 % infusion, 125 mL/hr, Intravenous, Continuous, Angel Hernandez MD, Last Rate: 125 mL/hr at 04/16/17 1656, 125 mL/hr at 04/16/17 1656  •  sodium chloride 0.9 % infusion, 125 mL/hr, Intravenous, Continuous, Marci Jimenez MD, Last Rate: 125 mL/hr at 04/17/17 0127, 125 mL/hr at 04/17/17 0127     Diagnostic Data    Lab Results (last 24 hours)     Procedure Component Value Units Date/Time    CBC (No Diff) [[id number]]  (Abnormal) Collected:  04/16/17 0554    Specimen:  Blood Updated:  04/16/17 0712     WBC 12.21 (H) 10*3/mm3      RBC 4.13 10*6/mm3      Hemoglobin 9.5 (L) g/dL      Hematocrit 30.3 (L) %      MCV 73.4 (L) fL      MCH 23.0 (L) pg      MCHC 31.4 g/dL      RDW 18.8 (H) %      RDW-SD 49.9 (H) fl      MPV 8.7 fL      Platelets 242 10*3/mm3     MRSA Screen, PCR [[id number]]   (Normal) Collected:  04/16/17 0430    Specimen:  Swab from Nares Updated:  04/16/17 0732     MRSA, PCR Negative    Narrative:       This test is performed by utilizing real time polymerace chain recation (PCR).     CK [[id number]]  (Normal) Collected:  04/16/17 0938    Specimen:  Blood Updated:  04/16/17 0957     Creatine Kinase 31 U/L     Comprehensive Metabolic Panel [[id number]]  (Abnormal) Collected:  04/16/17 0938    Specimen:  Blood Updated:  04/16/17 0957     Glucose 265 (H) mg/dL      BUN 40 (H) mg/dL      Creatinine 1.67 (H) mg/dL      Sodium 135 (L) mmol/L      Potassium 3.8 mmol/L      Chloride 98 mmol/L      CO2 25.0 mmol/L      Calcium 8.6 mg/dL      Total Protein 6.9 g/dL      Albumin 3.50 g/dL      ALT (SGPT) 28 U/L      AST (SGOT) 34 U/L      Alkaline Phosphatase 135 (H) U/L      Total Bilirubin 0.4 mg/dL      eGFR Non African Amer 32 (L) mL/min/1.73      Globulin 3.4 gm/dL      A/G Ratio 1.0 (L) g/dL      BUN/Creatinine Ratio 24.0     Anion Gap 12.0 mmol/L     Hemoglobin A1c [[id number]]  (Abnormal) Collected:  04/16/17 0938    Specimen:  Blood Updated:  04/16/17 1001     Hemoglobin A1C 12.86 (H) %     CK-MB [[id number]]  (Normal) Collected:  04/16/17 0938    Specimen:  Blood Updated:  04/16/17 1007     CKMB 0.60 ng/mL     BNP [[id number]]  (Normal) Collected:  04/16/17 0938    Specimen:  Blood Updated:  04/16/17 1007     proBNP 267.0 pg/mL     Iron Profile [[id number]]  (Abnormal) Collected:  04/16/17 0938    Specimen:  Blood Updated:  04/16/17 1009     Iron 28 (L) mcg/dL      TIBC 389 mcg/dL      Iron Saturation 7 (L) %     Troponin [[id number]]  (Normal) Collected:  04/16/17 0938    Specimen:  Blood Updated:  04/16/17 1010     Troponin I <0.012 ng/mL     Ferritin [[id number]]  (Abnormal) Collected:  04/16/17 0938    Specimen:  Blood Updated:  04/16/17 1034     Ferritin 8.40 (L) ng/mL     Vitamin B12 [[id number]]  (Normal) Collected:  04/16/17 0938    Specimen:  Blood Updated:  04/16/17 1104     Vitamin B-12 316  pg/mL     Folate [[id number]]  (Normal) Collected:  04/16/17 0938    Specimen:  Blood Updated:  04/16/17 1104     Folate 12.20 ng/mL     POC Glucose Fingerstick [[id number]]  (Abnormal) Collected:  04/16/17 0959    Specimen:  Blood Updated:  04/16/17 1655     Glucose 273 (H) mg/dL       RN NotifiedMeter: [id number]: [id number] LARRY PUENTE       POC Glucose Fingerstick [[id number]]  (Abnormal) Collected:  04/16/17 2023    Specimen:  Blood Updated:  04/16/17 2052     Glucose 262 (H) mg/dL       RN NotifiedMeter: [id number]: [id number] LEATHA CADET       Blood Culture [[id number]]  (Normal) Collected:  04/16/17 0938    Specimen:  Blood from Arm, Right Updated:  04/16/17 2201     Blood Culture No growth at less than 24 hours    Blood Culture [[id number]]  (Normal) Collected:  04/16/17 0938    Specimen:  Blood from Arm, Left Updated:  04/16/17 2201     Blood Culture No growth at less than 24 hours    Lactic Acid, Plasma [[id number]]  (Abnormal) Collected:  04/16/17 2334    Specimen:  Blood Updated:  04/17/17 0013     Lactate 2.2 (C) mmol/L     Blood Culture [[id number]]  (Normal) Collected:  04/16/17 0041    Specimen:  Blood from Arm, Left Updated:  04/17/17 0102     Blood Culture No growth at 24 hours    Blood Culture [[id number]]  (Normal) Collected:  04/16/17 0230    Specimen:  Blood from Arm, Right Updated:  04/17/17 0301     Blood Culture No growth at 24 hours    Urine Culture [[id number]]  (Abnormal) Collected:  04/16/17 0455    Specimen:  Urine from Urine, Catheter Updated:  04/17/17 0619     Urine Culture >100,000 CFU/mL Gram Negative Bacilli (A)     Beta Lactamase --    CBC (No Diff) [[id number]]  (Abnormal) Collected:  04/17/17 0556    Specimen:  Blood Updated:  04/17/17 0621     WBC 8.70 10*3/mm3      RBC 3.96 10*6/mm3      Hemoglobin 9.1 (L) g/dL      Hematocrit 29.6 (L) %      MCV 74.7 (L) fL      MCH 23.0 (L) pg      MCHC 30.7 (L) g/dL      RDW 19.1 (H) %      RDW-SD 51.9 (H) fl      MPV 8.7 fL       Platelets 271 10*3/mm3     Comprehensive Metabolic Panel [[id number]]  (Abnormal) Collected:  04/17/17 0556    Specimen:  Blood Updated:  04/17/17 0635     Glucose 188 (H) mg/dL      BUN 26 (H) mg/dL      Creatinine 1.07 (H) mg/dL      Sodium 137 mmol/L      Potassium 4.3 mmol/L      Chloride 106 mmol/L      CO2 20.0 (L) mmol/L      Calcium 8.8 mg/dL      Total Protein 6.5 g/dL      Albumin 3.20 (L) g/dL      ALT (SGPT) 27 U/L      AST (SGOT) 25 U/L      Alkaline Phosphatase 142 (H) U/L      Total Bilirubin 0.4 mg/dL      eGFR Non African Amer 53 mL/min/1.73      Globulin 3.3 gm/dL      A/G Ratio 1.0 (L) g/dL      BUN/Creatinine Ratio 24.3     Anion Gap 11.0 mmol/L     Respiratory Culture [[id number]] Collected:  04/16/17 1815    Specimen:  Sputum from Cough Updated:  04/17/17 0636     Respiratory Culture Culture in progress     Gram Stain Result Moderate (3+) WBCs per low power field      Rare (1+) Epithelial cells per low power field      Mixed bacterial nataly    POC Glucose Fingerstick [[id number]]  (Abnormal) Collected:  04/16/17 1629    Specimen:  Blood Updated:  04/17/17 0639     Glucose 173 (H) mg/dL       RN NotifiedMeter: [id number]: [id number] LARRY PUENTE       POC Glucose Fingerstick [[id number]]  (Abnormal) Collected:  04/17/17 0624    Specimen:  Blood Updated:  04/17/17 0641     Glucose 214 (H) mg/dL       Sliding Scale AdminMeter: [id number]: [id number] GEOVANNI PERKINS I reviewed the patient's new clinical results.    Assessment/Plan:     Active Hospital Problems (** Indicates Principal Problem)    Diagnosis Date Noted   • **Sepsis due to urinary tract infection [A41.9, N39.0] 04/16/2017     Priority: High     Urine culture showed >100,000 CFU/mL Gram Negative Bacilli (A) - species pending  -Lactic Acid: 3.3 ->2.2, recheck this AM  -Continue IV fluids   - Rocephin day 2         • Hypotension [I95.9] 04/16/2017     - continue IV fluids   -Current SBP is in 113      • Fall  [W19.XXXA] 04/16/2017     - No LOC, continue telemetry  - CT of head negative, will get MRI Brain without contrast to rule out stroke  - ECHO - read pending  - Carotid u/s: Proximal left internal carotid artery soft atherosclerotic  plaque causing 50-69% diameter stenosis.     • Acute kidney injury [N17.9] 04/16/2017     -Continue IV fluids  -Admission Creatinine 1.67, today 1.07  -Hold nephrotoxic drugs      • Anemia [D64.9] 04/16/2017     -Chronic  -B12, foalte normal,  low iron, low ferritin, normal TIBC     • Hypothyroidism [E03.9] 04/16/2017     -TSH is 5.280  -fT4 pending  - iron supplementation      • Diabetes mellitus type 2 in obese [E11.9, E66.9] 08/24/2016     -Insulin sliding scale   - Novolog 45u TID with meals  - Levemir 95u q12h  - A1C 12.86       • Morbid obesity with BMI of 50.0-59.9, adult [E66.01, Z68.43]      BMI 51.2, dietary consult ordered     • Carotid artery stenosis [I65.29]      Previously DWIGHT 0-15%, LICA 0-15%. LICA stent 5/2014.  - Repeat Carotid u/s 4/17/17: Proximal left internal carotid artery soft atherosclerotic plaque causing 50-69% diameter stenosis.        Resolved Hospital Problems    Diagnosis Date Noted Date Resolved   No resolved problems to display.       DVT prophylaxis: Lovenox  Code status is Full Code    Plan for disposition:Home with home health sepsis protocol when appropriate    Signature  Yadira Delarosa MD PGY2  [place] [address]  Office: [phone]    This document has been electronically signed by Yadira Delarosa MD on April 17, 2017 7:18 AM

## 2017-04-17 NOTE — PROGRESS NOTES
Acute Care - Physical Therapy Initial Evaluation  [facility]     Patient Name: Yamileth Slater  : 1960  MRN: [id number]  Today's Date: 2017   Onset of Illness/Injury or Date of Surgery Date: 17  Date of Referral to PT: 17  Referring Physician: ISAIAS Delarosa MD.      Admit Date: 2017     Visit Dx:    ICD-10-CM ICD-9-CM   1. Hypotension, unspecified hypotension type I95.9 458.9   2. Sepsis, due to unspecified organism A41.9 038.9     995.91   3. Leukocytosis, unspecified type D72.829 288.60   4. Lactic acid acidosis E87.2 276.2   5. Elevated BUN R79.9 790.6   6. Creatinine elevation R79.89 790.99   7. Lethargic R53.83 780.79   8. Hyperglycemia R73.9 790.29   9. Impaired physical mobility Z74.09 781.99     Patient Active Problem List   Diagnosis   • Diabetes mellitus type 2 in obese   • Chronic obstructive pulmonary disease   • Low back pain   • Vitamin D deficiency   • Type 2 diabetes mellitus   • Viral wart   • Tobacco dependence syndrome   • Surgical follow-up care   • MIKE on CPAP   • Shoulder joint pain   • Rash   • Peripheral vascular disease   • Pain   • Non-healing surgical wound   • Neurologic disorder associated with diabetes mellitus   • Need for vaccination   • Morbid obesity with BMI of 50.0-59.9, adult   • Mixed hyperlipidemia   • Lower limb anomaly   • Leukocytosis   • Kidney stone   • Infestation by Sarcoptes scabiei deepak hominis   • Infection of skin and subcutaneous tissue   • Hypertensive disorder   • History of colon polyps   • GERD (gastroesophageal reflux disease)   • Furuncle of neck   • Excoriated eczema   • Essential hypertension   • Encounter for general adult medical examination with abnormal findings   • Dysphagia   • Dyslipidemia   • Diabetes mellitus   • Coronary arteriosclerosis   • Chronic obstructive lung disease   • Chronic folliculitis   • Chest pain   • Carotid artery stenosis   • Candidiasis of vulva and vagina   • Candidiasis of mouth   •  Mild persistent asthma   • Anxiety states   • 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   • Abdominal wall abscess   • Uncontrolled type 2 diabetes mellitus with complication, with long-term current use of insulin   • Pneumonia of both lungs due to methicillin resistant Staphylococcus aureus (MRSA)   • Prophylactic hospital isolation   • Pneumonia of both lungs due to infectious organism   • Hypotension   • Fall   • Acute kidney injury   • Sepsis due to Gram negative bacteria   • Anemia   • Hypothyroidism   • Bacterial cystitis - gram negative     Past Medical History:   Diagnosis Date   • Anxiety states    • Asthma    • Candidiasis of mouth    • Candidiasis of vulva and vagina    • Carotid artery stenosis     DWIGHT 0-15%, LICA 0-15%. LICA stent 5/2014.   • Chest pain    • Chronic folliculitis    • Chronic obstructive lung disease    • Coronary arteriosclerosis    • Diabetes mellitus     no retinopathy; A1C 9.1      • Dyslipidemia    • Dysphagia    • Encounter for general adult medical examination with abnormal findings    • Essential hypertension    • Excoriated eczema     asteosis/keratosis   • Furuncle of neck    • GERD (gastroesophageal reflux disease)    • History of colon polyps    • Hypertensive disorder    • Infection of skin and subcutaneous tissue     rt perineal abscess   • Infestation by Sarcoptes scabiei deepak hominis    • Kidney stone    • Leukocytosis    • Lower limb anomaly     Abscess of lower limb - ANTX causing n/v      • Mixed hyperlipidemia    • Morbid obesity due to excess calories     BMI 44.5   • Need for vaccination    • Neurologic disorder associated with diabetes mellitus    • Non-healing surgical wound     LLE EVHa   • Pain     LLE   • Peripheral vascular disease    • Rash    • Shoulder joint pain    • Sleep apnea    • Surgical follow-up care     5/27/14 L carotid stent   • Tobacco dependence syndrome     1/2ppd      • Type 2 diabetes mellitus    • Viral wart     RIGHT middle  edwina   • Vitamin D deficiency      Past Surgical History:   Procedure Laterality Date   • CARDIAC CATHETERIZATION  08/09/2013    Severe multivessel CAD with critical lesions noted in the RCA, obtuse marginal two, ramus intermemdious, and LAD as described above. Normal LV systolic function with no wall motion abnormality.    • CARDIAC CATHETERIZATION  05/27/2014     LEFT Carotid Stent    • COLONOSCOPY  05/02/2016    Normal colon.Diverticulosis in the sigmoid colon.No specimens collected.    • CORONARY ARTERY BYPASS GRAFT  11/15/2013    CABG x 3 with LIMA to LAD and coronary endarterectomy to LAD, SVG to Ramus intermedius branch and SVG to PDA.    • ENDOSCOPY  09/23/2015     Esophageal stricture present.Normal stomach.Normal duodenum.    • ENDOSCOPY  11/16/2015    w/ dilatation - Esophageal stricture present,Dilatation performed.Normal stomach and duodenum.    • INCISION AND DRAINAGE ABSCESS  01/02/2016    Incision and drainage of right perineal abscess.    • INCISION AND DRAINAGE ABSCESS  02/18/2014    Incision and Drainage of abscess of left lower leg    • OTHER SURGICAL HISTORY  01/25/2016    DESTRUCTION OF BENIGN LESION      • OTHER SURGICAL HISTORY  04/18/2014    ear/neck - L attempted CEA, Neck Dissection           PT ASSESSMENT (last 72 hours)      PT Evaluation       04/17/17 1018 04/16/17 0400    Rehab Evaluation    Document Type evaluation  -CZ     Subjective Information agree to therapy;complains of;pain  -CZ     Patient Effort, Rehab Treatment good  -CZ     Symptoms Noted Comment Speech slurred at times.  -CZ     General Information    Patient Profile Review yes  -CZ     Onset of Illness/Injury or Date of Surgery Date 04/16/17  -CZ     Referring Physician ISAIAS Delarosa MD.  -CZ     General Observations Patient supine in bed, on 2 LPM supplemental O2 via nasal canula, cooperative, reports R LE pain secondary to recent fall.  -CZ     Pertinent History Of Current Problem Patient had two falls at home and  new onset of slurred speech. X-rays negative, MRI pending.   -CZ     Precautions/Limitations fall precautions  -CZ     Prior Level of Function independent:;all household mobility;gait;transfer;bed mobility  -CZ     Equipment Currently Used at Home walker, rolling   Used 4WW at home most recently.  -CZ none  -TJ    Plans/Goals Discussed With patient  -CZ     Benefits Reviewed patient:;improve function;increase independence;increase strength;increase balance  -CZ     Living Environment    Lives With spouse;child(lynette), adult;child(lynette), dependent  -CZ spouse  -TJ    Living Arrangements apartment  -CZ apartment  -TJ    Home Accessibility no concerns  -CZ no concerns  -TJ    Stair Railings at Home none  -CZ none  -TJ    Type of Financial/Environmental Concern other (see comments)   Long walk through parking lot to apartment.  -CZ none  -TJ    Transportation Available family or friend will provide  -CZ car;family or friend will provide  -TJ    Clinical Impression    Date of Referral to PT 04/17/17  -CZ     PT Diagnosis Impaired physical mobility  -CZ     Prognosis good  -CZ     Functional Level At Time Of Evaluation Indepenedence with transfers and bed mobility, CGA with gait using RW.  -CZ     Criteria for Skilled Therapeutic Interventions Met treatment indicated  -CZ     Pathology/Pathophysiology Noted (Describe Specifically for Each System) musculoskeletal;pulmonary;cardiovascular  -CZ     Impairments Found (describe specific impairments) aerobic capacity/endurance;gait, locomotion, and balance  -CZ     Rehab Potential good, to achieve stated therapy goals  -CZ     Predicted Duration of Therapy Intervention (days/wks) 5-7 days  -CZ     Vital Signs    Pre Systolic BP Rehab 128  -CZ     Pre Treatment Diastolic BP 78  -CZ     Post Systolic BP Rehab 134  -CZ     Post Treatment Diastolic BP 82  -CZ     Pretreatment Heart Rate (beats/min) 99  -CZ     Intratreatment Heart Rate (beats/min) 95  -CZ     Posttreatment Heart Rate  (beats/min) 99  -CZ     Pre SpO2 (%) 97  -CZ     O2 Delivery Pre Treatment supplemental O2   2 LPM  -CZ     Intra SpO2 (%) 95   95% on RA after first walk, 87% after second.  -CZ     O2 Delivery Intra Treatment room air   Returned to 97% on 2 LPM with 60 sec.  -CZ     Pre Patient Position Supine  -CZ     Intra Patient Position Sitting  -CZ     Post Patient Position Sitting  -CZ     Pain Assessment    Pain Assessment 0-10  -CZ     Pain Score 8  -CZ     Post Pain Score 9  -CZ     Pain Location Ankle  -CZ     Pain Orientation Right   Also R hip, knee, and shoulder, secondary to multiple falls.  -CZ     Pain Intervention(s) Repositioned;Medication (See MAR)  -CZ     Vision Assessment/Intervention    Visual Impairment WFL with corrective lenses  -CZ     Cognitive Assessment/Intervention    Current Cognitive/Communication Assessment functional  -CZ     Orientation Status oriented x 4  -CZ     Follows Commands/Answers Questions 100% of the time  -CZ     ROM (Range of Motion)    General ROM no range of motion deficits identified  -CZ     General ROM Detail WFL BLEs.  -CZ     MMT (Manual Muscle Testing)    General MMT Assessment lower extremity strength deficits identified  -CZ     General MMT Assessment Detail 4-/5 BLE strength grossly.  -CZ     Bed Mobility, Assessment/Treatment    Bed Mobility, Assistive Device head of bed elevated  -CZ     Bed Mob, Supine to Sit, [place] conditional independence  -CZ     Bed Mob, Sit to Supine, [place] conditional independence  -CZ     Transfer Assessment/Treatment    Transfers, Sit-Stand [place] conditional independence  -CZ     Transfers, Stand-Sit [place] conditional independence  -CZ     Toilet Transfer, [place] conditional independence  -CZ     Toilet Transfer, Assistive Device rolling walker  -CZ     Transfer, Safety Issues impulsivity  -CZ     Gait Assessment/Treatment    Gait, [place] Level contact guard assist  -CZ     Gait, Assistive Device rolling  walker  -CZ     Gait, Distance (Feet) 20   20'x2.  -CZ     Gait, Gait Pattern Analysis swing-through gait  -CZ     Gait, Comment Lists L and R.  -CZ     Sensory Assessment/Intervention    Light Touch LLE;RLE  -CZ     LLE Light Touch mild impairment  -CZ     RLE Light Touch mild impairment  -CZ     Positioning and Restraints    Pre-Treatment Position in bed  -CZ     Post Treatment Position chair  -CZ     In Bed with other staff   Transported to radiology.  -CZ       User Key  (r) = Recorded By, (t) = Taken By, (c) = Cosigned By    Initials Name Provider Type    SRINIVAS Vasquez, RN Registered Nurse    CZ Christopher Arnett, PT Physical Therapist          Physical Therapy Education     Title: PT OT SLP Therapies (Active)     Topic: Physical Therapy (Active)     Point: Precautions (Active)    Learning Progress Summary    Learner Readiness Method Response Comment Documented by Status   Patient Acceptance E NR Tinetti assessed: 20/28 or moderate fall risk; discussed fall risk with patient; she is to utilize a RW for ambulation until her fall risk is decreased.  04/17/17 1135 Active                      User Key     Initials Effective Dates Name Provider Type Discipline     02/17/17 -  Christopher Arnett, PT Physical Therapist PT                PT Recommendation and Plan  Anticipated Equipment Needs At Discharge:  (Has own 4WW.)  Anticipated Discharge Disposition: home with home health  Planned Therapy Interventions: balance training, gait training, home exercise program, strengthening  PT Frequency: other (see comments) (5-14x/week)  Plan of Care Review  Plan Of Care Reviewed With: patient  Outcome Summary/Follow up Plan: Initial PT evaluation complete.  Patient demonstrates independence with bed mobility and transfers, requires CGA ambulating with a RW.   Patient at moderate risk for future falls (Tinetti: 20/28).  Patient would benefit from continued skilled PT intervention.           IP PT Goals       04/17/17 8870           Gait Training PT STG    Gait Training Goal PT STG, Date Established 04/17/17  -CZ      Gait Training Goal PT STG, Time to Achieve 5 - 7 days  -CZ      Gait Training Goal PT STG, [place] Level conditional independence  -CZ      Gait Training Goal PT STG, Assist Device walker, rolling  -CZ      Gait Training Goal PT STG, Distance to Achieve 150'x1  -CZ      Gait Training Goal PT STG, Date Goal Reviewed 04/17/17  -CZ      Gait Training Goal PT STG, Outcome goal ongoing  -CZ      Strength Goal PT LTG    Strength Goal PT LTG, Date Established 04/17/17  -CZ      Strength Goal PT LTG, Time to Achieve by discharge  -CZ      Strength Goal PT LTG, Measure to Achieve MMT 4+/5 BLEs.  -CZ      Strength Goal PT LTG, Functional Goal To improve standing tolerance.   -CZ      Strength Goal PT LTG, Date Goal Reviewed 04/17/17  -CZ      Strength Goal PT LTG, Outcome goal ongoing  -CZ      Physical Therapy PT LTG    Physical Therapy PT LTG, Date Established 04/17/17  -CZ      Physical Therapy PT LTG, Time to Achieve by discharge  -CZ      Physical Therapy PT LTG, Activity Type Tinetti fall risk assessment  -      Physical Therapy PT LTG, Addisional Goal Score >/=24/28 or low fall risk.   -CZ      Physical Therapy PT LTG, Date Goal Reviewed 04/17/17  -CZ      Physical Therapy PT LTG, Outcome goal ongoing  -        User Key  (r) = Recorded By, (t) = Taken By, (c) = Cosigned By    Initials Name Provider Type    ROSANNA Arnett, PT Physical Therapist                Outcome Measures       04/17/17 1018          How much help from another person do you currently need...    Turning from your back to your side while in flat bed without using bedrails? 4  -CZ      Moving from lying on back to sitting on the side of a flat bed without bedrails? 4  -CZ      Moving to and from a bed to a chair (including a wheelchair)? 4  -CZ      Standing up from a chair using your arms (e.g., wheelchair, bedside chair)? 4  -CZ      Climbing 3-5  "steps with a railing? 3  -CZ      To walk in hospital room? 3  -CZ      AM-PAC 6 Clicks Score 22  -CZ      Tinetti Assessment    Tinetti Assessment yes  -CZ      Sitting Balance 1  -CZ      Arises 2  -CZ      Attempts to Rise 2  -CZ      Immediate Standing Balance (first 5 sec) 1  -CZ      Standing Balance 1  -CZ      Sternal Nudge (feet close together) 2  -CZ      Eyes Closed (feet close together) 1  -CZ      Turning 360 Degrees- Steps 1  -CZ      Turning 360 Degrees- Steadiness 0  -CZ      Sitting Down 2  -CZ      Tinetti Balance Score 13  -CZ      Gait Initiation (immediate after told \"go\") 1  -CZ      Step Length- Right Swing 1  -CZ      Step Length- Left Swing 1  -CZ      Foot Clearance- Right Foot 1  -CZ      Foot Clearance- Left Foot 1  -CZ      Step Symmetry 1  -CZ      Step Continuity 0  -CZ      Path (excursion) 1  -CZ      Trunk 0  -CZ      Base of Support 0  -CZ      Gait Score 7  -CZ      Tinetti Total Score 20  -CZ      Tinetti Assistive Device rolling walker  -CZ      Functional Assessment    Outcome Measure Options Tinetti;AM-PAC 6 Clicks Basic Mobility (PT)  -CZ        User Key  (r) = Recorded By, (t) = Taken By, (c) = Cosigned By    Initials Name Provider Type    CZ Christopher Arnett PT Physical Therapist           Time Calculation:         PT Charges       04/17/17 1150          Time Calculation    Start Time 1018  -CZ      Stop Time 1115  -CZ      Time Calculation (min) 57 min  -CZ      PT Received On 04/17/17  -CZ      PT Goal Re-Cert Due Date 04/30/17  -CZ      Time Calculation- PT    Total Timed Code Minutes- PT 42 minute(s)  -CZ        User Key  (r) = Recorded By, (t) = Taken By, (c) = Cosigned By    Initials Name Provider Type    CZ Christopher Arnett PT Physical Therapist          Therapy Charges for Today     Code Description Service Date Service Provider Modifiers Qty    36503074029  PT MOBILITY CURRENT 4/17/2017 Christopher Arnett, PT GP, CJ 1    94919294738 HC PT MOBILITY PROJECTED " 4/17/2017 Christopher Arnett, PT GP, CI 1    92123515168 HC PT EVAL MOD COMPLEXITY 2 4/17/2017 Christopher Arnett, PT GP 1    22671877478 HC GAIT TRAINING EA 15 MIN 4/17/2017 Christopher Arnett, PT GP 1    74122557360 HC PT THERAPEUTIC ACT EA 15 MIN 4/17/2017 Christopher Arnett PT GP 2          PT G-Codes  PT Professional Judgement Used?: Yes  Outcome Measure Options: WIL [place] 6 Clicks Basic Mobility (PT)  Score: 20  Functional Limitation: Mobility: Walking and moving around  Mobility: Walking and Moving Around Current Status (): At least 20 percent but less than 40 percent impaired, limited or restricted  Mobility: Walking and Moving Around Goal Status (): At least 1 percent but less than 20 percent impaired, limited or restricted      Christopher Arnett, PT  4/17/2017

## 2017-04-17 NOTE — PLAN OF CARE
Problem: Inpatient Physical Therapy  Goal: Strength Goal LTG- PT  Outcome: Ongoing (interventions implemented as appropriate)    04/17/17 1018   Strength Goal PT LTG   Strength Goal PT LTG, Date Established 04/17/17   Strength Goal PT LTG, Time to Achieve by discharge   Strength Goal PT LTG, Measure to Achieve MMT 4+/5 BLEs.   Strength Goal PT LTG, Functional Goal To improve standing tolerance.    Strength Goal PT LTG, Date Goal Reviewed 04/17/17   Strength Goal PT LTG, Outcome goal ongoing       Goal: Physical Therapy Goal LTG- PT  Outcome: Ongoing (interventions implemented as appropriate)    04/17/17 1018   Physical Therapy PT LTG   Physical Therapy PT LTG, Date Established 04/17/17   Physical Therapy PT LTG, Time to Achieve by discharge   Physical Therapy PT LTG, Activity Type Tinetti fall risk assessment   Physical Therapy PT LTG, Addisional Goal Score >/=24/28 or low fall risk.    Physical Therapy PT LTG, Date Goal Reviewed 04/17/17   Physical Therapy PT LTG, Outcome goal ongoing       Goal: Gait Training Goal STG- PT  Outcome: Ongoing (interventions implemented as appropriate)    04/17/17 1018   Gait Training PT STG   Gait Training Goal PT STG, Date Established 04/17/17   Gait Training Goal PT STG, Time to Achieve 5 - 7 days   Gait Training Goal PT STG, [place] Level conditional independence   Gait Training Goal PT STG, Assist Device walker, rolling   Gait Training Goal PT STG, Distance to Achieve 150'x1   Gait Training Goal PT STG, Date Goal Reviewed 04/17/17   Gait Training Goal PT STG, Outcome goal ongoing

## 2017-04-17 NOTE — PROGRESS NOTES
Discharge Planning Assessment  [facility]     Patient Name: Yamileth Slater  MRN: [id number]  Today's Date: 4/17/2017    Admit Date: 4/16/2017          Discharge Needs Assessment       04/17/17 1334    Living Environment    Lives With spouse    Living Arrangements apartment    Provides Primary Care For other (see comments)   [facility]    Quality Of Family Relationships supportive    Able to Return to Prior Living Arrangements yes    Living Arrangement Comments pt resides at home with her spouse and grandaughter.    Discharge Needs Assessment    Concerns To Be Addressed adjustment to diagnosis/illness concerns    Anticipated Changes Related to Illness none    Equipment Currently Used at Home walker, rolling    Equipment Needed After Discharge oxygen    Discharge Facility/Level Of Care Needs home with home health    Transportation Available family or friend will provide    Current Discharge Risk chronically ill    Discharge Disposition home healthcare service            Discharge Plan       04/17/17 0247    Case Management/Social Work Plan    Plan Home with home health and possibly home o2 if qualifies.    Patient/Family In Agreement With Plan yes    Additional Comments LSW assesment complete. pt resides at home with her spouse and grandaughter. pt appears to have good family support. According to family plan is for pt to return home at d/c. pt may benefit from home health follow up and possibly home o2. LSW awaiting additional recomendations from MD and therapy. LSW/case mgt will follow up as consulted and complete arrangements as ordered.                                            Discharge Placement     No information found                Demographic Summary       04/17/17 1332    Referral Information    Admission Type inpatient    Referral Source physician    Reason For Consult discharge planning    Record Reviewed medical record    Primary Care Physician Information    Name Dr morgna             Functional Status       04/17/17 7035    Functional Status Prior    Ambulation 0-->independent    Transferring 0-->independent    Toileting 0-->independent    Bathing 0-->independent    Dressing 0-->independent    Eating 0-->independent    Communication 0-->understands/communicates without difficulty    Swallowing 0-->swallows foods/liquids without difficulty    IADL    Medications independent    Meal Preparation independent    Housekeeping independent    Laundry independent    Shopping independent    Oral Care independent    Activity Tolerance    Current Activity Limitations none    Usual Activity Tolerance good    Current Activity Tolerance moderate    Cognitive/Perceptual/Developmental    Current Mental Status/Cognitive Functioning no deficits noted    Recent Changes in Mental Status/Cognitive Functioning unable to assess    Developmental Stage (Eriksson's Stages of Development) Stage 7 (35-65 years/Middle Adulthood) Generativity vs. Stagnation            Psychosocial     None            Abuse/Neglect     None            Legal     None            Substance Abuse     None            Patient Forms     None          JOHN Carballo

## 2017-04-17 NOTE — PROGRESS NOTES
FAMILY MEDICINE PROGRESS NOTE  NAME: Yamileth Slater  : 1960  MRN: [id number]     LOS: 1 day   Full Code  PROVIDER OF SERVICE:     Chief Complaint:  Sepsis due to urinary tract infection    Subjective     Interval History:  History taken from: patient  Subjective: She endorses left substernal chest pain that is a pressure-like sensation.  She endorses that it started when the IV fluids were restarted.  She denies any shortness of breath.  Denies any diaphoresis, nausea, or vomiting.    Review of Systems:   Review of Systems   Constitutional: Negative for activity change, appetite change, fatigue and fever.   HENT: Negative for ear pain and sore throat.    Eyes: Negative for pain and visual disturbance.   Respiratory: Negative for cough and shortness of breath.    Cardiovascular: Positive for chest pain. Negative for palpitations.   Gastrointestinal: Negative for abdominal pain and nausea.   Endocrine: Negative for cold intolerance and heat intolerance.   Genitourinary: Negative for difficulty urinating and dysuria.   Musculoskeletal: Negative for arthralgias and gait problem.   Skin: Negative for color change and rash.   Neurological: Negative for dizziness, weakness and headaches.   Hematological: Negative for adenopathy. Does not bruise/bleed easily.   Psychiatric/Behavioral: Negative for agitation, confusion and sleep disturbance.       Objective     Vital Signs  Temp:  [96.5 °F (35.8 °C)-97.9 °F (36.6 °C)] 96.6 °F (35.9 °C)  Heart Rate:  [] 110  Resp:  [18-20] 20  BP: ()/(52-84) 124/84    Physical Exam  Physical Exam   Constitutional: She is oriented to person, place, and time. She appears well-developed and well-nourished.   HENT:   Head: Normocephalic and atraumatic.   Right Ear: External ear normal.   Left Ear: External ear normal.   Nose: Nose normal.   Mouth/Throat: Oropharynx is clear and moist.   Eyes: Conjunctivae and EOM are normal.   Neck: Normal range of motion. Neck supple.    Cardiovascular: Normal rate, regular rhythm, normal heart sounds and intact distal pulses.    Pulmonary/Chest: Effort normal and breath sounds normal. She exhibits no tenderness.   Abdominal: Soft. Bowel sounds are normal. She exhibits no distension. There is no tenderness.   Musculoskeletal: Normal range of motion.   Neurological: She is alert and oriented to person, place, and time.   Skin: Skin is warm and dry.   Psychiatric: She has a normal mood and affect. Her speech is normal and behavior is normal. Judgment and thought content normal. Cognition and memory are normal.       Medication Review    Current Facility-Administered Medications:   •  atorvastatin (LIPITOR) tablet 20 mg, 20 mg, Oral, Daily, Macri Jimenez MD, 20 mg at 04/16/17 1137  •  budesonide-formoterol (SYMBICORT) 160-4.5 MCG/ACT inhaler 2 puff, 2 puff, Inhalation, BID - RT, Marci Jimenez MD  •  cefTRIAXone (ROCEPHIN) 1 g/100 mL 0.9% NS (MBP), 1 g, Intravenous, Q24H, Marci Jimenez MD, 1 g at 04/16/17 0941  •  cetirizine (zyrTEC) tablet 5 mg, 5 mg, Oral, Daily, Marci Jimenez MD, 5 mg at 04/16/17 1137  •  clobetasol (TEMOVATE) 0.05 % cream, , Topical, Q12H, Marci Jimenez MD  •  clopidogrel (PLAVIX) tablet 75 mg, 75 mg, Oral, Daily, Marci Jimenez MD, 75 mg at 04/16/17 0833  •  cyclobenzaprine (FLEXERIL) tablet 10 mg, 10 mg, Oral, BID, Marci Jimenez MD, 10 mg at 04/16/17 1656  •  enoxaparin (LOVENOX) syringe 30 mg, 30 mg, Subcutaneous, Daily, Marci Jimenez MD, 30 mg at 04/16/17 0833  •  ferrous sulfate EC tablet 324 mg, 324 mg, Oral, Daily With Breakfast, Marci Jimenez MD  •  gabapentin (NEURONTIN) capsule 800 mg, 800 mg, Oral, TID, Marci Jimenez MD, 800 mg at 04/16/17 2148  •  HYDROcodone-acetaminophen (NORCO) 7.5-325 MG per tablet 1 tablet, 1 tablet, Oral, Q6H PRN, Marci Jimenez MD, 1 tablet at 04/17/17 0133  •  insulin aspart (novoLOG) injection 45 Units, 45 Units,  Subcutaneous, TID With Meals, Marci Jimenez MD, 45 Units at 04/16/17 1811  •  insulin detemir (LEVEMIR) injection 95 Units, 95 Units, Subcutaneous, Q12H, Yadira Delarosa MD  •  ipratropium-albuterol (DUO-NEB) nebulizer solution 3 mL, 3 mL, Nebulization, 4x Daily, Marci Jimenez MD, 3 mL at 04/17/17 0716  •  metoprolol tartrate (LOPRESSOR) tablet 50 mg, 50 mg, Oral, BID, Marci Jimenez MD  •  montelukast (SINGULAIR) tablet 10 mg, 10 mg, Oral, Nightly, Marci Jimenez MD, 10 mg at 04/16/17 2145  •  nitroglycerin (NITROSTAT) SL tablet 0.4 mg, 0.4 mg, Sublingual, PRN, Marci Jimenez MD  •  pantoprazole (PROTONIX) EC tablet 40 mg, 40 mg, Oral, Daily, Marci Jimenez MD, 40 mg at 04/16/17 0833  •  promethazine (PHENERGAN) tablet 25 mg, 25 mg, Oral, Q6H PRN, Marci Jimenez MD  •  sodium chloride 0.9 % flush 1-10 mL, 1-10 mL, Intravenous, PRN, Marci Jimenez MD  •  sodium chloride 0.9 % flush 10 mL, 10 mL, Intravenous, PRN, Angel Hernandez MD  •  sodium chloride 0.9 % infusion, 125 mL/hr, Intravenous, Continuous, Angel Hernandez MD, Last Rate: 125 mL/hr at 04/16/17 1656, 125 mL/hr at 04/16/17 1656  •  sodium chloride 0.9 % infusion, 125 mL/hr, Intravenous, Continuous, Marci Jimenez MD, Last Rate: 125 mL/hr at 04/17/17 0850, 125 mL/hr at 04/17/17 0850     Diagnostic Data      I reviewed the patient's new clinical results and imaging.      Assessment/Plan     Active Hospital Problems (** Indicates Principal Problem)    Diagnosis Date Noted   • **Sepsis due to urinary tract infection [A41.9, N39.0] 04/16/2017     Urine culture showed >100,000 CFU/mL Gram Negative Bacilli (A) - species pending  -Lactic Acid: 3.3 ->2.2, recheck this AM  -Continue IV fluids   - Rocephin day 2         • Hypotension [I95.9] 04/16/2017     - continue IV fluids   -Current SBP is in 113      • Fall [W19.XXXA] 04/16/2017     - No LOC, continue telemetry  - CT of head negative, will get MRI  Brain without contrast to rule out stroke  - ECHO - read pending  - Carotid u/s: Proximal left internal carotid artery soft atherosclerotic  plaque causing 50-69% diameter stenosis.     • Acute kidney injury [N17.9] 04/16/2017     -Continue IV fluids  -Admission Creatinine 1.67, today 1.07  -Hold nephrotoxic drugs      • Anemia [D64.9] 04/16/2017     -Chronic  -B12, foalte normal,  low iron, low ferritin, normal TIBC     • Hypothyroidism [E03.9] 04/16/2017     -TSH is 5.280  -fT4 pending  - iron supplementation      • Diabetes mellitus type 2 in obese [E11.9, E66.9] 08/24/2016     -Insulin sliding scale   - Novolog 45u TID with meals  - Levemir 95u q12h  - A1C 12.86       • Morbid obesity with BMI of 50.0-59.9, adult [E66.01, Z68.43]      BMI 51.2, dietary consult ordered     • Carotid artery stenosis [I65.29]      Previously DWIGHT 0-15%, LICA 0-15%. LICA stent 5/2014.  - Repeat Carotid u/s 4/17/17: Proximal left internal carotid artery soft atherosclerotic plaque causing 50-69% diameter stenosis.        Resolved Hospital Problems    Diagnosis Date Noted Date Resolved   No resolved problems to display.         DVT prophylaxis: Lovenox      Disposition:Home when stable    I have seen the patient.  I have reviewed the notes, assessments, and/or procedures performed by Yadira Delarosa MD , I concur with her/his documentation and assessment and plan for Yamileth Slater.          This document has been electronically signed by Pauline Martinez MD on April 17, 2017 8:57 AM

## 2017-04-17 NOTE — PROGRESS NOTES
Acute Care - Physical Therapy Treatment Note  [facility]     Patient Name: Yamileth Slater  : 1960  MRN: [id number]  Today's Date: 2017  Onset of Illness/Injury or Date of Surgery Date: 17  Date of Referral to PT: 17  Referring Physician: ISAIAS Delarosa MD.    Admit Date: 2017    Visit Dx:    ICD-10-CM ICD-9-CM   1. Hypotension, unspecified hypotension type I95.9 458.9   2. Sepsis, due to unspecified organism A41.9 038.9     995.91   3. Leukocytosis, unspecified type D72.829 288.60   4. Lactic acid acidosis E87.2 276.2   5. Elevated BUN R79.9 790.6   6. Creatinine elevation R79.89 790.99   7. Lethargic R53.83 780.79   8. Hyperglycemia R73.9 790.29   9. Impaired physical mobility Z74.09 781.99     Patient Active Problem List   Diagnosis   • Diabetes mellitus type 2 in obese   • Chronic obstructive pulmonary disease   • Low back pain   • Vitamin D deficiency   • Type 2 diabetes mellitus   • Viral wart   • Tobacco dependence syndrome   • Surgical follow-up care   • MIKE on CPAP   • Shoulder joint pain   • Rash   • Peripheral vascular disease   • Pain   • Non-healing surgical wound   • Neurologic disorder associated with diabetes mellitus   • Need for vaccination   • Morbid obesity with BMI of 50.0-59.9, adult   • Mixed hyperlipidemia   • Lower limb anomaly   • Leukocytosis   • Kidney stone   • Infestation by Sarcoptes scabiei deepak hominis   • Infection of skin and subcutaneous tissue   • Hypertensive disorder   • History of colon polyps   • GERD (gastroesophageal reflux disease)   • Furuncle of neck   • Excoriated eczema   • Essential hypertension   • Encounter for general adult medical examination with abnormal findings   • Dysphagia   • Dyslipidemia   • Diabetes mellitus   • Coronary arteriosclerosis   • Chronic obstructive lung disease   • Chronic folliculitis   • Chest pain   • Carotid artery stenosis   • Candidiasis of vulva and vagina   • Candidiasis of mouth   • Mild  persistent asthma   • Anxiety states   • 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   • Abdominal wall abscess   • Uncontrolled type 2 diabetes mellitus with complication, with long-term current use of insulin   • Pneumonia of both lungs due to methicillin resistant Staphylococcus aureus (MRSA)   • Prophylactic hospital isolation   • Pneumonia of both lungs due to infectious organism   • Hypotension   • Fall   • Acute kidney injury   • Sepsis due to Gram negative bacteria   • Anemia   • Hypothyroidism   • Bacterial cystitis - gram negative               Adult Rehabilitation Note       04/17/17 1330          Rehab Assessment/Intervention    Discipline physical therapist  -CZ      Document Type therapy note (daily note)  -CZ      Patient Effort, Rehab Treatment good  -CZ      Precautions/Limitations fall precautions;oxygen therapy device and L/min  -CZ      Patient Response to Treatment Decreased HR, nurse notified.   -CZ      Recorded by [CZ] Christopher Arnett, PT      Vital Signs    Pre Systolic BP Rehab 142  -CZ      Pre Treatment Diastolic BP 82  -CZ      Post Systolic BP Rehab 124  -CZ      Post Treatment Diastolic BP 78  -CZ      Pretreatment Heart Rate (beats/min) 86  -CZ      Posttreatment Heart Rate (beats/min) 51  -CZ      Pre SpO2 (%) 99  -CZ      O2 Delivery Pre Treatment supplemental O2  -CZ      Post SpO2 (%) 96  -CZ      O2 Delivery Post Treatment supplemental O2  -CZ      Pre Patient Position Sitting  -CZ      Intra Patient Position Sitting  -CZ      Post Patient Position Sitting  -CZ      Recorded by [CZ] Christopher Arnett, PT      Pain Assessment    Pain Assessment 0-10  -CZ      Pain Score 6  -CZ      Post Pain Score 0  -CZ      Pain Type Acute pain  -CZ      Pain Location Ankle  -CZ      Pain Orientation Right  -CZ      Recorded by [CZ] Christopher Arnett, PT      Cognitive Assessment/Intervention    Current Cognitive/Communication Assessment functional  -CZ      Follows Commands/Answers  Questions 100% of the time  -CZ      Personal Safety mild impairment  -CZ      Personal Safety Interventions gait belt;nonskid shoes/slippers when out of bed  -CZ      Recorded by [CZ] Christopher Arnett, PT      Bed Mobility, Assessment/Treatment    Bed Mob, Supine to Sit, [place] conditional independence  -CZ      Bed Mob, Sit to Supine, [place] conditional independence  -CZ      Recorded by [CZ] Christopher Arnett, PT      Transfer Assessment/Treatment    Transfers, Sit-Stand [place] conditional independence  -CZ      Transfers, Stand-Sit [place] conditional independence  -CZ      Transfers, Sit-Stand-Sit, Assist Device rolling walker  -CZ      Recorded by [CZ] Christopher Arnett, PT      Gait Assessment/Treatment    Gait, [place] Level stand by assist  -CZ      Gait, Assistive Device rolling walker  -CZ      Gait, Distance (Feet) 10  -CZ      Gait, Gait Pattern Analysis swing-through gait  -CZ      Gait, Comment LOB to R x 1 while exiting the bathroom.   -CZ      Recorded by [CZ] Christopher Arnett, PT      Therapy Exercises    Bilateral Lower Extremities AROM:;20 reps;standing;heel raises;hip abduction/adduction;hip flexion;mini squats   20 x 2 each, no c/o R ankle discomfort; CGA during SLS.  -CZ      Recorded by [CZ] Christopher Arnett PT      Positioning and Restraints    Pre-Treatment Position in bed  -CZ      Post Treatment Position bed  -CZ      In Bed supine;call light within reach  -CZ      Recorded by [CZ] Christopher Arnett, PT        User Key  (r) = Recorded By, (t) = Taken By, (c) = Cosigned By    Initials Name Effective Dates    CZ Christopher Arnett, PT 02/17/17 -                 IP PT Goals       04/17/17 1330 04/17/17 1018       Gait Training PT STG    Gait Training Goal PT STG, Date Established  04/17/17  -CZ     Gait Training Goal PT STG, Time to Achieve  5 - 7 days  -CZ     Gait Training Goal PT STG, [place] Level  conditional independence  -CZ     Gait Training Goal PT STG, Assist  Device  walker, rolling  -     Gait Training Goal PT STG, Distance to Achieve  150'x1  -CZ     Gait Training Goal PT STG, Date Goal Reviewed 04/17/17  -CZ 04/17/17  -CZ     Gait Training Goal PT STG, Outcome goal ongoing  - goal ongoing  -     Strength Goal PT LTG    Strength Goal PT LTG, Date Established  04/17/17  -     Strength Goal PT LTG, Time to Achieve  by discharge  -     Strength Goal PT LTG, Measure to Achieve  MMT 4+/5 BLEs.  -CZ     Strength Goal PT LTG, Functional Goal  To improve standing tolerance.   -     Strength Goal PT LTG, Date Goal Reviewed 04/17/17  -CZ 04/17/17  -CZ     Strength Goal PT LTG, Outcome goal ongoing  - goal ongoing  -     Physical Therapy PT LTG    Physical Therapy PT LTG, Date Established  04/17/17  -     Physical Therapy PT LTG, Time to Achieve  by discharge  -     Physical Therapy PT LTG, Activity Type  Tinetti fall risk assessment  -     Physical Therapy PT LTG, Addisional Goal  Score >/=24/28 or low fall risk.   -     Physical Therapy PT LTG, Date Goal Reviewed 04/17/17  -CZ 04/17/17  -     Physical Therapy PT LTG, Outcome goal ongoing  - goal ongoing  -       User Key  (r) = Recorded By, (t) = Taken By, (c) = Cosigned By    Initials Name Provider Type     Christopher Arnett, PT Physical Therapist          Physical Therapy Education     Title: PT OT SLP Therapies (Active)     Topic: Physical Therapy (Active)     Point: Home exercise program (Active)    Learning Progress Summary    Learner Readiness Method Response Comment Documented by Status   Patient Acceptance E NR Patient educated on proper standing ther ex routine, to improve standing tolerance and LE strength.  04/17/17 1444 Active               Point: Precautions (Active)    Learning Progress Summary    Learner Readiness Method Response Comment Documented by Status   Patient Acceptance E NR Tinetti assessed: 20/28 or moderate fall risk; discussed fall risk with patient; she is to utilize  a RW for ambulation until her fall risk is decreased.  04/17/17 1135 Active                      User Key     Initials Effective Dates Name Provider Type Discipline     02/17/17 -  Christopher Arnett, PT Physical Therapist PT                    PT Recommendation and Plan  Anticipated Equipment Needs At Discharge:  (Has own 4WW.)  Anticipated Discharge Disposition: home with home health  Planned Therapy Interventions: balance training, gait training, home exercise program, strengthening  PT Frequency: other (see comments) (5-14x/week)  Plan of Care Review  Plan Of Care Reviewed With: patient  Outcome Summary/Follow up Plan: Patient alert and cooperative.  Patient reports 6/10 R ankle pain at rest.  However, she performs standing ther ex without complaint.  Patient  demonstrated no S/S of distress, though her HR dropped to 51 after standing ther ex.  Nurse notified.           Outcome Measures       04/17/17 1330 04/17/17 1018       How much help from another person do you currently need...    Turning from your back to your side while in flat bed without using bedrails? 4  -CZ 4  -CZ     Moving from lying on back to sitting on the side of a flat bed without bedrails? 4  -CZ 4  -CZ     Moving to and from a bed to a chair (including a wheelchair)? 4  -CZ 4  -CZ     Standing up from a chair using your arms (e.g., wheelchair, bedside chair)? 4  -CZ 4  -CZ     Climbing 3-5 steps with a railing? 3  -CZ 3  -CZ     To walk in hospital room? 3  -CZ 3  -CZ     AM-PAC 6 Clicks Score 22  -CZ 22  -CZ     Tinetti Assessment    Tinetti Assessment  yes  -CZ     Sitting Balance  1  -CZ     Arises  2  -CZ     Attempts to Rise  2  -CZ     Immediate Standing Balance (first 5 sec)  1  -CZ     Standing Balance  1  -CZ     Sternal Nudge (feet close together)  2  -CZ     Eyes Closed (feet close together)  1  -CZ     Turning 360 Degrees- Steps  1  -CZ     Turning 360 Degrees- Steadiness  0  -CZ     Sitting Down  2  -CZ     Tinetti Balance  "Score  13  -CZ     Gait Initiation (immediate after told \"go\")  1  -CZ     Step Length- Right Swing  1  -CZ     Step Length- Left Swing  1  -CZ     Foot Clearance- Right Foot  1  -CZ     Foot Clearance- Left Foot  1  -CZ     Step Symmetry  1  -CZ     Step Continuity  0  -CZ     Path (excursion)  1  -CZ     Trunk  0  -CZ     Base of Support  0  -CZ     Gait Score  7  -CZ     Tinetti Total Score  20  -CZ     Tinetti Assistive Device  rolling walker  -CZ     Functional Assessment    Outcome Measure Options  Tinetti;AM-PAC 6 Clicks Basic Mobility (PT)  -CZ       User Key  (r) = Recorded By, (t) = Taken By, (c) = Cosigned By    Initials Name Provider Type    CZ Christopher Arnett PT Physical Therapist           Time Calculation:         PT Charges       04/17/17 1448 04/17/17 1150       Time Calculation    Start Time 1330  -CZ 1018  -CZ     Stop Time 1422  -CZ 1115  -CZ     Time Calculation (min) 52 min  -CZ 57 min  -CZ     PT Received On 04/17/17  -CZ 04/17/17  -CZ     PT Goal Re-Cert Due Date  04/30/17  -CZ     Time Calculation- PT    Total Timed Code Minutes- PT 52 minute(s)  -CZ 42 minute(s)  -CZ       User Key  (r) = Recorded By, (t) = Taken By, (c) = Cosigned By    Initials Name Provider Type    CZ Christopher Arnett PT Physical Therapist          Therapy Charges for Today     Code Description Service Date Service Provider Modifiers Qty    21237189547 HC PT MOBILITY CURRENT 4/17/2017 Christopher Arnett, PT GP, CJ 1    41636129044 HC PT MOBILITY PROJECTED 4/17/2017 Christopher Arnett, PT GP, CI 1    51668637623 HC PT EVAL MOD COMPLEXITY 2 4/17/2017 Christopher Arnett PT GP 1    17585719852 HC GAIT TRAINING EA 15 MIN 4/17/2017 Christopher Arnett PT GP 1    46974123836 HC PT THERAPEUTIC ACT EA 15 MIN 4/17/2017 Christopher Arnett PT GP 2    30234916694 HC PT THERAPEUTIC ACT EA 15 MIN 4/17/2017 Christopher Arnett PT GP 3          PT G-Codes  PT Professional Judgement Used?: Yes  Outcome Measure Options: [place], AM-PAC 6 Clicks Basic " Mobility (PT)  Score: 20  Functional Limitation: Mobility: Walking and moving around  Mobility: Walking and Moving Around Current Status (): At least 20 percent but less than 40 percent impaired, limited or restricted  Mobility: Walking and Moving Around Goal Status (): At least 1 percent but less than 20 percent impaired, limited or restricted    Christopher Arnett, PT  4/17/2017

## 2017-04-18 VITALS
WEIGHT: 281.5 LBS | OXYGEN SATURATION: 96 % | HEIGHT: 62 IN | RESPIRATION RATE: 18 BRPM | SYSTOLIC BLOOD PRESSURE: 136 MMHG | BODY MASS INDEX: 51.8 KG/M2 | TEMPERATURE: 97.5 F | DIASTOLIC BLOOD PRESSURE: 61 MMHG | HEART RATE: 73 BPM

## 2017-04-18 PROBLEM — A41.4 SEPSIS DUE TO KLEBSIELLA PNEUMONIAE: Status: ACTIVE | Noted: 2017-04-16

## 2017-04-18 PROBLEM — B96.1 KLEBSIELLA CYSTITIS: Status: ACTIVE | Noted: 2017-04-17

## 2017-04-18 PROBLEM — N30.90 KLEBSIELLA CYSTITIS: Status: ACTIVE | Noted: 2017-04-17

## 2017-04-18 PROBLEM — N17.9 ACUTE KIDNEY INJURY (HCC): Status: RESOLVED | Noted: 2017-04-16 | Resolved: 2017-04-18

## 2017-04-18 PROBLEM — I95.9 HYPOTENSION: Status: RESOLVED | Noted: 2017-04-16 | Resolved: 2017-04-18

## 2017-04-18 PROBLEM — A41.4 SEPSIS DUE TO KLEBSIELLA PNEUMONIAE (HCC): Status: RESOLVED | Noted: 2017-04-16 | Resolved: 2017-04-18
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
## 2017-04-18 LAB
ALBUMIN SERPL-MCNC: 3.2 G/DL (ref 3.4–4.8)
ALBUMIN/GLOB SERPL: 1 G/DL (ref 1.1–1.8)
ALP SERPL-CCNC: 129 U/L (ref 38–126)
ALT SERPL W P-5'-P-CCNC: 22 U/L (ref 9–52)
ANION GAP SERPL CALCULATED.3IONS-SCNC: 8 MMOL/L (ref 5–15)
AST SERPL-CCNC: 29 U/L (ref 14–36)
BACTERIA SPEC AEROBE CULT: ABNORMAL
BETA LACTAMASE: ABNORMAL
BH CV ECHO MEAS - ACS: 1.9 CM
BH CV ECHO MEAS - AO MAX PG (FULL): 9.6 MMHG
BH CV ECHO MEAS - AO MAX PG: 17.7 MMHG
BH CV ECHO MEAS - AO MEAN PG (FULL): 6.4 MMHG
BH CV ECHO MEAS - AO MEAN PG: 11.4 MMHG
BH CV ECHO MEAS - AO ROOT AREA: 6.2 CM^2
BH CV ECHO MEAS - AO ROOT DIAM: 2.8 CM
BH CV ECHO MEAS - AO V2 MAX: 210.2 CM/SEC
BH CV ECHO MEAS - AO V2 MEAN: 157.6 CM/SEC
BH CV ECHO MEAS - AO V2 VTI: 37.2 CM
BH CV ECHO MEAS - AVA(I,A): 1.5 CM^2
BH CV ECHO MEAS - AVA(I,D): 1.5 CM^2
BH CV ECHO MEAS - AVA(V,A): 1.7 CM^2
BH CV ECHO MEAS - AVA(V,D): 1.7 CM^2
BH CV ECHO MEAS - EDV(CUBED): 102.9 ML
BH CV ECHO MEAS - EDV(TEICH): 101.6 ML
BH CV ECHO MEAS - EF(CUBED): 68.6 %
BH CV ECHO MEAS - EF(TEICH): 60.2 %
BH CV ECHO MEAS - ESV(CUBED): 32.3 ML
BH CV ECHO MEAS - ESV(TEICH): 40.5 ML
BH CV ECHO MEAS - FS: 32 %
BH CV ECHO MEAS - IVS/LVPW: 1.1
BH CV ECHO MEAS - IVSD: 1.2 CM
BH CV ECHO MEAS - LA DIMENSION: 3.6 CM
BH CV ECHO MEAS - LA/AO: 1.3
BH CV ECHO MEAS - LV MASS(C)D: 196.6 GRAMS
BH CV ECHO MEAS - LV MAX PG: 8.1 MMHG
BH CV ECHO MEAS - LV MEAN PG: 5 MMHG
BH CV ECHO MEAS - LV V1 MAX: 142.3 CM/SEC
BH CV ECHO MEAS - LV V1 MEAN: 103.3 CM/SEC
BH CV ECHO MEAS - LV V1 VTI: 22.6 CM
BH CV ECHO MEAS - LVIDD: 4.7 CM
BH CV ECHO MEAS - LVIDS: 3.2 CM
BH CV ECHO MEAS - LVOT AREA (M): 2.5 CM^2
BH CV ECHO MEAS - LVOT AREA: 2.5 CM^2
BH CV ECHO MEAS - LVOT DIAM: 1.8 CM
BH CV ECHO MEAS - LVPWD: 1.1 CM
BH CV ECHO MEAS - MV A MAX VEL: 83.7 CM/SEC
BH CV ECHO MEAS - MV DEC SLOPE: 692.4 CM/SEC^2
BH CV ECHO MEAS - MV E MAX VEL: 96.3 CM/SEC
BH CV ECHO MEAS - MV E/A: 1.2
BH CV ECHO MEAS - MV MAX PG: 6.3 MMHG
BH CV ECHO MEAS - MV MEAN PG: 2.6 MMHG
BH CV ECHO MEAS - MV P1/2T MAX VEL: 120.5 CM/SEC
BH CV ECHO MEAS - MV P1/2T: 51 MSEC
BH CV ECHO MEAS - MV V2 MAX: 125.4 CM/SEC
BH CV ECHO MEAS - MV V2 MEAN: 74.1 CM/SEC
BH CV ECHO MEAS - MV V2 VTI: 30.5 CM
BH CV ECHO MEAS - MVA P1/2T LCG: 1.8 CM^2
BH CV ECHO MEAS - MVA(P1/2T): 4.3 CM^2
BH CV ECHO MEAS - MVA(VTI): 1.9 CM^2
BH CV ECHO MEAS - PA MAX PG: 8 MMHG
BH CV ECHO MEAS - PA V2 MAX: 141.2 CM/SEC
BH CV ECHO MEAS - RVDD: 2.2 CM
BH CV ECHO MEAS - SV(AO): 230.8 ML
BH CV ECHO MEAS - SV(CUBED): 70.6 ML
BH CV ECHO MEAS - SV(LVOT): 56.7 ML
BH CV ECHO MEAS - SV(TEICH): 61.2 ML
BH CV ECHO MEAS - TR MAX VEL: 276.9 CM/SEC
BILIRUB SERPL-MCNC: 0.3 MG/DL (ref 0.2–1.3)
BUN BLD-MCNC: 16 MG/DL (ref 7–21)
BUN/CREAT SERPL: 18.6 (ref 7–25)
CALCIUM SPEC-SCNC: 8.4 MG/DL (ref 8.4–10.2)
CHLORIDE SERPL-SCNC: 108 MMOL/L (ref 95–110)
CO2 SERPL-SCNC: 24 MMOL/L (ref 22–31)
CREAT BLD-MCNC: 0.86 MG/DL (ref 0.5–1)
D-LACTATE SERPL-SCNC: 1.4 MMOL/L (ref 0.5–2)
DEPRECATED RDW RBC AUTO: 52.8 FL (ref 36.4–46.3)
ERYTHROCYTE [DISTWIDTH] IN BLOOD BY AUTOMATED COUNT: 19.3 % (ref 11.5–14.5)
GFR SERPL CREATININE-BSD FRML MDRD: 68 ML/MIN/1.73 (ref 60–120)
GLOBULIN UR ELPH-MCNC: 3.2 GM/DL (ref 2.3–3.5)
GLUCOSE BLD-MCNC: 133 MG/DL (ref 60–100)
GLUCOSE BLDC GLUCOMTR-MCNC: 147 MG/DL (ref 70–130)
GLUCOSE BLDC GLUCOMTR-MCNC: 202 MG/DL (ref 70–130)
GLUCOSE BLDC GLUCOMTR-MCNC: 95 MG/DL (ref 70–130)
HCT VFR BLD AUTO: 28.4 % (ref 35–45)
HGB BLD-MCNC: 8.5 G/DL (ref 12–15.5)
MCH RBC QN AUTO: 22.7 PG (ref 26.5–34)
MCHC RBC AUTO-ENTMCNC: 29.9 G/DL (ref 31.4–36)
MCV RBC AUTO: 75.7 FL (ref 80–98)
PLATELET # BLD AUTO: 275 10*3/MM3 (ref 150–450)
PMV BLD AUTO: 8.4 FL (ref 8–12)
POTASSIUM BLD-SCNC: 4.4 MMOL/L (ref 3.5–5.1)
PROT SERPL-MCNC: 6.4 G/DL (ref 6.3–8.6)
RBC # BLD AUTO: 3.75 10*6/MM3 (ref 3.77–5.16)
SODIUM BLD-SCNC: 140 MMOL/L (ref 137–145)
WBC NRBC COR # BLD: 5.39 10*3/MM3 (ref 3.2–9.8)

## 2017-04-18 PROCEDURE — 97110 THERAPEUTIC EXERCISES: CPT

## 2017-04-18 PROCEDURE — 25010000002 ENOXAPARIN PER 10 MG: Performed by: FAMILY MEDICINE

## 2017-04-18 PROCEDURE — 82962 GLUCOSE BLOOD TEST: CPT

## 2017-04-18 PROCEDURE — 94799 UNLISTED PULMONARY SVC/PX: CPT

## 2017-04-18 PROCEDURE — 63710000001 INSULIN DETEMIR PER 5 UNITS: Performed by: FAMILY MEDICINE

## 2017-04-18 PROCEDURE — 25010000002 CEFTRIAXONE: Performed by: FAMILY MEDICINE

## 2017-04-18 PROCEDURE — 83605 ASSAY OF LACTIC ACID: CPT | Performed by: FAMILY MEDICINE

## 2017-04-18 PROCEDURE — 94760 N-INVAS EAR/PLS OXIMETRY 1: CPT

## 2017-04-18 PROCEDURE — 99239 HOSP IP/OBS DSCHRG MGMT >30: CPT | Performed by: FAMILY MEDICINE

## 2017-04-18 PROCEDURE — 80053 COMPREHEN METABOLIC PANEL: CPT | Performed by: FAMILY MEDICINE

## 2017-04-18 PROCEDURE — 85027 COMPLETE CBC AUTOMATED: CPT | Performed by: FAMILY MEDICINE

## 2017-04-18 PROCEDURE — 97116 GAIT TRAINING THERAPY: CPT

## 2017-04-18 PROCEDURE — 63710000001 INSULIN ASPART PER 5 UNITS: Performed by: FAMILY MEDICINE

## 2017-04-18 PROCEDURE — 97530 THERAPEUTIC ACTIVITIES: CPT

## 2017-04-18 RX ORDER — CIPROFLOXACIN 500 MG/1
500 TABLET, FILM COATED ORAL 2 TIMES DAILY
Qty: 20 TABLET | Refills: 0 | Status: SHIPPED | OUTPATIENT
Start: 2017-04-18 | End: 2017-04-28

## 2017-04-18 RX ORDER — FERROUS SULFATE TAB EC 324 MG (65 MG FE EQUIVALENT) 324 (65 FE) MG
324 TABLET DELAYED RESPONSE ORAL
Qty: 30 TABLET | Refills: 3 | Status: SHIPPED | OUTPATIENT
Start: 2017-04-18 | End: 2017-05-17

## 2017-04-18 RX ADMIN — METOPROLOL TARTRATE 50 MG: 50 TABLET ORAL at 08:09

## 2017-04-18 RX ADMIN — CLOBETASOL PROPIONATE: 0.5 CREAM TOPICAL at 08:14

## 2017-04-18 RX ADMIN — GABAPENTIN 800 MG: 400 CAPSULE ORAL at 08:09

## 2017-04-18 RX ADMIN — CETIRIZINE HYDROCHLORIDE 5 MG: 5 TABLET, FILM COATED ORAL at 08:09

## 2017-04-18 RX ADMIN — INSULIN ASPART 45 UNITS: 100 INJECTION, SOLUTION INTRAVENOUS; SUBCUTANEOUS at 08:11

## 2017-04-18 RX ADMIN — CEFTRIAXONE 1 G: 1 INJECTION, POWDER, FOR SOLUTION INTRAMUSCULAR; INTRAVENOUS at 09:00

## 2017-04-18 RX ADMIN — HYDROCODONE BITARTRATE AND ACETAMINOPHEN 1 TABLET: 7.5; 325 TABLET ORAL at 12:14

## 2017-04-18 RX ADMIN — CLOPIDOGREL BISULFATE 75 MG: 75 TABLET ORAL at 08:09

## 2017-04-18 RX ADMIN — ENOXAPARIN SODIUM 40 MG: 40 INJECTION SUBCUTANEOUS at 10:09

## 2017-04-18 RX ADMIN — FERROUS SULFATE TAB EC 324 MG (65 MG FE EQUIVALENT) 324 MG: 324 (65 FE) TABLET DELAYED RESPONSE at 08:08

## 2017-04-18 RX ADMIN — INSULIN DETEMIR 95 UNITS: 100 INJECTION, SOLUTION SUBCUTANEOUS at 10:08

## 2017-04-18 RX ADMIN — IPRATROPIUM BROMIDE AND ALBUTEROL SULFATE 3 ML: 2.5; .5 SOLUTION RESPIRATORY (INHALATION) at 08:49

## 2017-04-18 RX ADMIN — ATORVASTATIN CALCIUM 20 MG: 20 TABLET, FILM COATED ORAL at 08:08

## 2017-04-18 RX ADMIN — PANTOPRAZOLE SODIUM 40 MG: 40 TABLET, DELAYED RELEASE ORAL at 10:08

## 2017-04-18 RX ADMIN — CYCLOBENZAPRINE HYDROCHLORIDE 10 MG: 10 TABLET, FILM COATED ORAL at 08:08

## 2017-04-18 RX ADMIN — IPRATROPIUM BROMIDE AND ALBUTEROL SULFATE 3 ML: 2.5; .5 SOLUTION RESPIRATORY (INHALATION) at 15:42

## 2017-04-18 RX ADMIN — INSULIN ASPART 45 UNITS: 100 INJECTION, SOLUTION INTRAVENOUS; SUBCUTANEOUS at 12:14

## 2017-04-18 RX ADMIN — SODIUM CHLORIDE 75 ML/HR: 900 INJECTION, SOLUTION INTRAVENOUS at 09:00

## 2017-04-18 NOTE — PLAN OF CARE
Problem: Inpatient Physical Therapy  Goal: Strength Goal LTG- PT  Outcome: Ongoing (interventions implemented as appropriate)    04/17/17 1018 04/18/17 1022   Strength Goal PT LTG   Strength Goal PT LTG, Date Established 04/17/17 --    Strength Goal PT LTG, Time to Achieve by discharge --    Strength Goal PT LTG, Measure to Achieve MMT 4+/5 BLEs. --    Strength Goal PT LTG, Functional Goal To improve standing tolerance.  --    Strength Goal PT LTG, Date Goal Reviewed --  04/18/17   Strength Goal PT LTG, Outcome --  goal ongoing       Goal: Physical Therapy Goal LTG- PT  Outcome: Ongoing (interventions implemented as appropriate)    04/17/17 1018 04/18/17 1022   Physical Therapy PT LTG   Physical Therapy PT LTG, Date Established 04/17/17 --    Physical Therapy PT LTG, Time to Achieve by discharge --    Physical Therapy PT LTG, Activity Type Tinetti fall risk assessment --    Physical Therapy PT LTG, Addisional Goal Score >/=24/28 or low fall risk.  --    Physical Therapy PT LTG, Date Goal Reviewed --  04/18/17   Physical Therapy PT LTG, Outcome --  goal ongoing       Goal: Gait Training Goal STG- PT  Outcome: Ongoing (interventions implemented as appropriate)    04/17/17 1018 04/18/17 1022   Gait Training PT STG   Gait Training Goal PT STG, Date Established 04/17/17 --    Gait Training Goal PT STG, Time to Achieve 5 - 7 days --    Gait Training Goal PT STG, [place] Level conditional independence --    Gait Training Goal PT STG, Assist Device walker, rolling --    Gait Training Goal PT STG, Distance to Achieve 150'x1 --    Gait Training Goal PT STG, Date Goal Reviewed --  04/18/17   Gait Training Goal PT STG, Outcome --  goal ongoing         Problem: Patient Care Overview (Adult)  Goal: Plan of Care Review  Outcome: Ongoing (interventions implemented as appropriate)    04/18/17 1022   Coping/Psychosocial Response Interventions   Plan Of Care Reviewed With patient   Outcome Evaluation   Outcome Summary/Follow up  Plan Pt showing good improvement with gait and transfers but continues to fatigue quickly with standing activity.   Patient Care Overview   Progress improving       Goal: Discharge Needs Assessment  Outcome: Ongoing (interventions implemented as appropriate)    04/17/17 1334 04/17/17 6217   Current Health   Anticipated Changes Related to Illness --  none   Self-Care   Equipment Currently Used at Home --  walker, rolling   Discharge Needs Assessment   Concerns To Be Addressed adjustment to diagnosis/illness concerns --    Equipment Needed After Discharge oxygen --    Discharge Facility/Level Of Care Needs home with home health --    Current Discharge Risk chronically ill --    Discharge Disposition home healthcare service --    Living Environment   Transportation Available family or friend will provide --

## 2017-04-18 NOTE — PROGRESS NOTES
Inpatient Rehabilitation - Physical Therapy Treatment Note  [facility]     Patient Name: Yamileth Slater  : 1960  MRN: [id number]  Today's Date: 2017  Onset of Illness/Injury or Date of Surgery Date: 17  Date of Referral to PT: 17  Referring Physician: ISAIAS Delarosa MD.    Admit Date: 2017    Visit Dx:    ICD-10-CM ICD-9-CM   1. Hypotension, unspecified hypotension type I95.9 458.9   2. Sepsis, due to unspecified organism A41.9 038.9     995.91   3. Leukocytosis, unspecified type D72.829 288.60   4. Lactic acid acidosis E87.2 276.2   5. Elevated BUN R79.9 790.6   6. Creatinine elevation R79.89 790.99   7. Lethargic R53.83 780.79   8. Hyperglycemia R73.9 790.29   9. Impaired physical mobility Z74.09 781.99     Patient Active Problem List   Diagnosis   • Diabetes mellitus type 2 in obese   • Chronic obstructive pulmonary disease   • Low back pain   • Vitamin D deficiency   • Type 2 diabetes mellitus   • Viral wart   • Tobacco dependence syndrome   • Surgical follow-up care   • MIKE on CPAP   • Shoulder joint pain   • Rash   • Peripheral vascular disease   • Pain   • Non-healing surgical wound   • Neurologic disorder associated with diabetes mellitus   • Need for vaccination   • Morbid obesity with BMI of 50.0-59.9, adult   • Mixed hyperlipidemia   • Lower limb anomaly   • Leukocytosis   • Kidney stone   • Infestation by Sarcoptes scabiei deepak hominis   • Infection of skin and subcutaneous tissue   • Hypertensive disorder   • History of colon polyps   • GERD (gastroesophageal reflux disease)   • Furuncle of neck   • Excoriated eczema   • Essential hypertension   • Encounter for general adult medical examination with abnormal findings   • Dysphagia   • Dyslipidemia   • Diabetes mellitus   • Coronary arteriosclerosis   • Chronic obstructive lung disease   • Chronic folliculitis   • Chest pain   • Carotid artery stenosis   • Candidiasis of vulva and vagina   • Candidiasis of mouth    • Mild persistent asthma   • Anxiety states   • 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   • Abdominal wall abscess   • Uncontrolled type 2 diabetes mellitus with complication, with long-term current use of insulin   • Pneumonia of both lungs due to methicillin resistant Staphylococcus aureus (MRSA)   • Prophylactic hospital isolation   • Pneumonia of both lungs due to infectious organism   • Fall   • Acute kidney injury   • Sepsis due to Klebsiella pneumoniae cystitis   • Anemia   • Hypothyroidism   • Klebsiella cystitis               Adult Rehabilitation Note       04/18/17 1022 04/17/17 1330       Rehab Assessment/Intervention    Discipline physical therapy assistant  -TW physical therapist  -CZ     Document Type therapy note (daily note)  -TW therapy note (daily note)  -CZ     Subjective Information agree to therapy  -TW      Patient Effort, Rehab Treatment good  -TW good  -CZ     Symptoms Noted During/After Treatment fatigue  -TW      Precautions/Limitations fall precautions  -TW fall precautions;oxygen therapy device and L/min  -CZ     Patient Response to Treatment  Decreased HR, nurse notified.   -CZ     Recorded by [TW] Rashi Ramires PTA [CZ] Christopher Arnett, PT     Vital Signs    Pre Systolic BP Rehab 141  -  -CZ     Pre Treatment Diastolic BP 66  -TW 82  -CZ     Post Systolic BP Rehab  124  -CZ     Post Treatment Diastolic BP  78  -CZ     Pretreatment Heart Rate (beats/min) 88  -TW 86  -CZ     Posttreatment Heart Rate (beats/min) 94  -TW 51  -CZ     Pre SpO2 (%) 96  -TW 99  -CZ     O2 Delivery Pre Treatment room air  -TW supplemental O2  -CZ     Post SpO2 (%) 98  -TW 96  -CZ     O2 Delivery Post Treatment  supplemental O2  -CZ     Pre Patient Position Supine  -TW Sitting  -CZ     Intra Patient Position Standing  -TW Sitting  -CZ     Post Patient Position Sitting  -TW Sitting  -CZ     Recorded by [TW] Rashi Ramires PTA [CZ] Christopher Arnett, PT     Pain Assessment    Pain  Assessment 0-10  -TW 0-10  -CZ     Pain Score 5  -TW 6  -CZ     Post Pain Score 5  -TW 0  -CZ     Pain Type  Acute pain  -CZ     Pain Location Ankle  -TW Ankle  -CZ     Pain Orientation Right  -TW Right  -CZ     Recorded by [TW] Rashi Ramires PTA [CZ] Christopher Arnett, PT     Cognitive Assessment/Intervention    Current Cognitive/Communication Assessment functional  -TW functional  -CZ     Orientation Status oriented x 4  -TW      Follows Commands/Answers Questions 100% of the time  -% of the time  -CZ     Personal Safety  mild impairment  -CZ     Personal Safety Interventions gait belt;nonskid shoes/slippers when out of bed  -TW gait belt;nonskid shoes/slippers when out of bed  -CZ     Recorded by [TW] Rashi Ramires PTA [CZ] Christopher Arnett, PT     Bed Mobility, Assessment/Treatment    Bed Mobility, Assistive Device head of bed elevated  -TW      Bed Mobility, Roll Left, Sawyer independent  -TW      Bed Mobility, Roll Right, Sawyer independent  -TW      Bed Mob, Supine to Sit, Sawyer independent  -TW conditional independence  -CZ     Bed Mob, Sit to Supine, Sawyer independent  -TW conditional independence  -CZ     Recorded by [TW] Rashi Ramires PTA [CZ] Christopher Arnett, PT     Transfer Assessment/Treatment    Transfers, Bed-Chair Sawyer independent  -TW      Transfers, Chair-Bed Sawyer independent  -TW      Transfers, Sit-Stand Sawyer conditional independence  -TW conditional independence  -CZ     Transfers, Stand-Sit Sawyer conditional independence  -TW conditional independence  -CZ     Transfers, Sit-Stand-Sit, Assist Device rolling walker  -TW rolling walker  -CZ     Toilet Transfer, Sawyer conditional independence  -TW      Toilet Transfer, Assistive Device rolling walker  -TW      Recorded by [TW] Rashi Ramires PTA [CZ] Christopher Arnett, PT     Gait Assessment/Treatment    Gait, Sawyer Level supervision required  -TW stand  by assist  -CZ     Gait, Assistive Device rolling walker  -TW rolling walker  -CZ     Gait, Distance (Feet) 148  -TW 10  -CZ     Gait, Gait Pattern Analysis swing-through gait  -TW swing-through gait  -CZ     Gait, Comment  LOB to R x 1 while exiting the bathroom.   -CZ     Recorded by [TW] Rashi Ramires PTA [CZ] Christopher Arnett, PT     Therapy Exercises    Bilateral Lower Extremities AROM:;20 reps;sitting;ankle pumps/circles;hip abduction/adduction;hip flexion;LAQ  -TW AROM:;20 reps;standing;heel raises;hip abduction/adduction;hip flexion;mini squats   20 x 2 each, no c/o R ankle discomfort; CGA during SLS.  -CZ     Recorded by [TW] Rashi Ramires PTA [CZ] Christopher Arnett PT     Positioning and Restraints    Pre-Treatment Position in bed  -TW in bed  -CZ     Post Treatment Position bed  -TW bed  -CZ     In Bed supine;sitting EOB;call light within reach  -TW supine;call light within reach  -CZ     Recorded by [TW] Rashi Ramires PTA [CZ] Christopher Arnett, PT       User Key  (r) = Recorded By, (t) = Taken By, (c) = Cosigned By    Initials Name Effective Dates    TW Rashi Ramires PTA 10/17/16 -     CZ Christopher Arnett, PT 02/17/17 -                 IP PT Goals       04/18/17 1022 04/17/17 1330 04/17/17 1018    Gait Training PT STG    Gait Training Goal PT STG, Date Established   04/17/17  -CZ    Gait Training Goal PT STG, Time to Achieve   5 - 7 days  -CZ    Gait Training Goal PT STG, Brant Level   conditional independence  -CZ    Gait Training Goal PT STG, Assist Device   walker, rolling  -CZ    Gait Training Goal PT STG, Distance to Achieve   150'x1  -CZ    Gait Training Goal PT STG, Date Goal Reviewed 04/18/17  -TW 04/17/17  -CZ 04/17/17  -CZ    Gait Training Goal PT STG, Outcome goal ongoing  -TW goal ongoing  -CZ goal ongoing  -CZ    Strength Goal PT LTG    Strength Goal PT LTG, Date Established   04/17/17  -CZ    Strength Goal PT LTG, Time to Achieve   by discharge  -CZ    Strength  Goal PT LTG, Measure to Achieve   MMT 4+/5 BLEs.  -CZ    Strength Goal PT LTG, Functional Goal   To improve standing tolerance.   -CZ    Strength Goal PT LTG, Date Goal Reviewed 04/18/17  -TW 04/17/17  -CZ 04/17/17  -CZ    Strength Goal PT LTG, Outcome goal ongoing  -TW goal ongoing  -CZ goal ongoing  -CZ    Physical Therapy PT LTG    Physical Therapy PT LTG, Date Established   04/17/17  -CZ    Physical Therapy PT LTG, Time to Achieve   by discharge  -    Physical Therapy PT LTG, Activity Type   Tinetti fall risk assessment  -    Physical Therapy PT LTG, Addisional Goal   Score >/=24/28 or low fall risk.   -CZ    Physical Therapy PT LTG, Date Goal Reviewed 04/18/17  -TW 04/17/17  -CZ 04/17/17  -    Physical Therapy PT LTG, Outcome goal ongoing  -TW goal ongoing  - goal ongoing  -      User Key  (r) = Recorded By, (t) = Taken By, (c) = Cosigned By    Initials Name Provider Type    IVETH Ramires, PTA Physical Therapy Assistant     Christopher Arnett, PT Physical Therapist          Physical Therapy Education     Title: PT OT SLP Therapies (Active)     Topic: Physical Therapy (Active)     Point: Home exercise program (Active)    Learning Progress Summary    Learner Readiness Method Response Comment Documented by Status   Patient Acceptance E NR Patient educated on proper standing ther ex routine, to improve standing tolerance and LE strength.  04/17/17 1444 Active               Point: Precautions (Active)    Learning Progress Summary    Learner Readiness Method Response Comment Documented by Status   Patient Acceptance E NR Tinetti assessed: 20/28 or moderate fall risk; discussed fall risk with patient; she is to utilize a RW for ambulation until her fall risk is decreased.  04/17/17 1135 Active                      User Key     Initials Effective Dates Name Provider Type Discipline     02/17/17 -  Christopher Arnett, PT Physical Therapist PT                    PT Recommendation and Plan  Anticipated  "Equipment Needs At Discharge:  (Has own 4WW.)  Anticipated Discharge Disposition: home with home health  Planned Therapy Interventions: balance training, gait training, home exercise program, strengthening  PT Frequency: other (see comments) (5-14x/week)  Plan of Care Review  Plan Of Care Reviewed With: patient  Progress: improving  Outcome Summary/Follow up Plan: Pt showing good improvement with gait and transfers but continues to fatigue quickly with standing activity.          Outcome Measures       04/18/17 1022 04/17/17 1330 04/17/17 1018    How much help from another person do you currently need...    Turning from your back to your side while in flat bed without using bedrails? 4  -TW 4  -CZ 4  -CZ    Moving from lying on back to sitting on the side of a flat bed without bedrails? 4  -TW 4  -CZ 4  -CZ    Moving to and from a bed to a chair (including a wheelchair)? 4  -TW 4  -CZ 4  -CZ    Standing up from a chair using your arms (e.g., wheelchair, bedside chair)? 4  -TW 4  -CZ 4  -CZ    Climbing 3-5 steps with a railing? 3  -TW 3  -CZ 3  -CZ    To walk in hospital room? 4  -TW 3  -CZ 3  -CZ    AM-PAC 6 Clicks Score 23  -TW 22  -CZ 22  -CZ    Tinetti Assessment    Tinetti Assessment   yes  -CZ    Sitting Balance   1  -CZ    Arises   2  -CZ    Attempts to Rise   2  -CZ    Immediate Standing Balance (first 5 sec)   1  -CZ    Standing Balance   1  -CZ    Sternal Nudge (feet close together)   2  -CZ    Eyes Closed (feet close together)   1  -CZ    Turning 360 Degrees- Steps   1  -CZ    Turning 360 Degrees- Steadiness   0  -CZ    Sitting Down   2  -CZ    Tinetti Balance Score   13  -CZ    Gait Initiation (immediate after told \"go\")   1  -CZ    Step Length- Right Swing   1  -CZ    Step Length- Left Swing   1  -CZ    Foot Clearance- Right Foot   1  -CZ    Foot Clearance- Left Foot   1  -CZ    Step Symmetry   1  -CZ    Step Continuity   0  -CZ    Path (excursion)   1  -CZ    Trunk   0  -CZ    Base of Support   0  -CZ "    Gait Score   7  -CZ    Tinetti Total Score   20  -CZ    Tinetti Assistive Device   rolling walker  -CZ    Functional Assessment    Outcome Measure Options AM-PAC 6 Clicks Basic Mobility (PT)  -TW  Tinetti;AM-PAC 6 Clicks Basic Mobility (PT)  -CZ      User Key  (r) = Recorded By, (t) = Taken By, (c) = Cosigned By    Initials Name Provider Type     Rashi Ramires PTA Physical Therapy Assistant    CZ Christopher Arnett, PT Physical Therapist           Time Calculation:         PT Charges       04/18/17 1418          Time Calculation    Start Time 1022  -TW      Stop Time 1103  -TW      Time Calculation (min) 41 min  -TW      PT Received On 04/18/17  -TW      Time Calculation- PT    Total Timed Code Minutes- PT 41 minute(s)  -TW        User Key  (r) = Recorded By, (t) = Taken By, (c) = Cosigned By    Initials Name Provider Type     Rashi Ramires PTA Physical Therapy Assistant          Therapy Charges for Today     Code Description Service Date Service Provider Modifiers Qty    60470753318 HC GAIT TRAINING EA 15 MIN 4/18/2017 Rashi Ramires PTA GP 1    29197086946 HC PT THERAPEUTIC ACT EA 15 MIN 4/18/2017 Rashi Ramires PTA GP 1    13937982135 HC PT THER PROC EA 15 MIN 4/18/2017 Rashi Ramires PTA GP 1          PT G-Codes  PT Professional Judgement Used?: Yes  Outcome Measure Options: AM-PAC 6 Clicks Basic Mobility (PT)  Score: 20  Functional Limitation: Mobility: Walking and moving around  Mobility: Walking and Moving Around Current Status (): At least 20 percent but less than 40 percent impaired, limited or restricted  Mobility: Walking and Moving Around Goal Status (): At least 1 percent but less than 20 percent impaired, limited or restricted    Rashi Ramires PTA  4/18/2017

## 2017-04-18 NOTE — DISCHARGE SUMMARY
DISCHARGE SUMMARY    NAME: Yamileth Slater   PHYSICIAN: Yadira Delarosa MD  : 1960  MRN: [id number]    ADMITTED: 2017   DISCHARGED: 2017    ADMISSION DIAGNOSES:  Active Hospital Problems (** Indicates Principal Problem)    Diagnosis Date Noted   • Klebsiella cystitis [N30.90, B96.89] 2017     Priority: Medium   • Anemia [D64.9] 2017   • Hypothyroidism [E03.9] 2017   • Diabetes mellitus type 2 in obese [E11.9, E66.9] 2016   • Chronic obstructive pulmonary disease [J44.9] 2016   • Morbid obesity with BMI of 50.0-59.9, adult [E66.01, Z68.43]    • Carotid artery stenosis [I65.29]    • MIKE on CPAP [G47.33]       Resolved Hospital Problems    Diagnosis Date Noted Date Resolved   • **Sepsis due to Klebsiella pneumoniae cystitis [A41.4] 2017     Priority: High   • Hypotension [I95.9] 2017   • Fall [W19.XXXA] 2017   • Acute kidney injury [N17.9] 2017     DISCHARGE DIAGNOSES:   Active Hospital Problems (** Indicates Principal Problem)    Diagnosis Date Noted   • Klebsiella cystitis [N30.90, B96.89] 2017     Priority: Medium   • Anemia [D64.9] 2017   • Hypothyroidism [E03.9] 2017   • Diabetes mellitus type 2 in obese [E11.9, E66.9] 2016   • Chronic obstructive pulmonary disease [J44.9] 2016   • Morbid obesity with BMI of 50.0-59.9, adult [E66.01, Z68.43]    • Carotid artery stenosis [I65.29]    • MIKE on CPAP [G47.33]       Resolved Hospital Problems    Diagnosis Date Noted Date Resolved   • **Sepsis due to Klebsiella pneumoniae cystitis [A41.4] 2017     Priority: High   • Hypotension [I95.9] 2017   • Fall [W19.XXXA] 2017   • Acute kidney injury [N17.9] 2017     SERVICE: Family Medicine. Attending Pauline Martinez MD, Residents:  Yadira Delarosa MD, Marci Jimenez MD    CONSULTS:   Consult Orders (all)   "   Start     Ordered    04/17/17 0718  Inpatient Consult to Nutrition  Once     Comments:  Morbid obesity BMI 51   Provider:  (Not yet assigned)    04/17/17 0717    04/16/17 0128  Family Practice - Resident (on-call MD unless specified)  Once     Provider:  Marci Jimenez MD    04/16/17 0128    04/16/17 0126  Family Practice - Resident (on-call MD unless specified)  Once     Specialty:  Family Medicine  Provider:  Marci Jimenez MD    04/16/17 0126          PROCEDURES:   4/16/17 ECHO: official report pending at time of discharge    HISTORY OF PRESENT ILLNESS:   Patient is a 57 y.o. female presented with multiple falls prior to admission without loss of consciousness.  Family reports slurred speech after her falls.  Patient is supposed to use CPAP at night for obstructive sleep apnea but is noncompliant with this therapy, states she uses it \"sometimes\".  She complained of some \"fluttering of her heart\" around the time of her fall.     DIAGNOSTIC DATA:   Admission Creatinine 1.67, Lactic Acid 3.3, CRP 4.90, WBC 14.42, Hemoglobin 10.8, Hematocrit 33.2, MCV 72.3     HOSPITAL COURSE:  Patient was admitted for sepsis due to klebsiella cystitis, she was placed on rocephin and given IV fluids.  She also had elevated creatinine at the time of her admission which resolved with IV fluids.  Given her history of frequent falls echocardiogram and ultrasound of bilateral carotids were obtained.  Carotid ultrasound revealed 50-65% stenosis of the proximal left internal carotid artery but was otherwise normal.  Echocardiogram report was pending at time of discharge.  Attempted to obtain MRI of the brain to rule out stroke but she did not fit in the scanner, will obtain this as an outpatient at [facility]  She was swabbed again for CRE due to her positive history, CRE this admission was negative. She responded well to antibiotic therapy and was stable for discharge with home health.     DISCHARGE CONDITION: "   Stable    DISPOSITION:  Home with home health    DISCHARGE MEDICATIONS   Yamileth Slater   Home Medication Instructions SETH:[id number]    Printed on:04/18/17 9256   Medication Information                      atorvastatin (LIPITOR) 20 MG tablet  Take 1 tablet by mouth Daily.             Blood Glucose Monitoring Suppl ([employer] BLOOD GLUCOSE METER) W/DEVICE kit  1 each 3 (Three) Times a Day.             budesonide-formoterol (SYMBICORT) 160-4.5 MCG/ACT inhaler  Inhale 2 puffs 2 (Two) Times a Day.             cetirizine (zyrTEC) 10 MG tablet  Take 1 tablet by mouth Daily.             ciprofloxacin (CIPRO) 500 MG tablet  Take 1 tablet by mouth 2 (Two) Times a Day for 10 days.             clobetasol (CLOBEX) 0.05 % lotion  apply to skin bid x 1-2w then prn. generic             clopidogrel (PLAVIX) 75 MG tablet  TAKE 1 TABLET BY MOUTH DAILY.             cyclobenzaprine (FLEXERIL) 10 MG tablet  Take 1 tablet by mouth 2 (Two) Times a Day.             ferrous sulfate 324 (65 FE) MG tablet delayed-release EC tablet  Take 1 tablet by mouth Daily With Breakfast for 29 days.             furosemide (LASIX) 40 MG tablet  Take 1 tablet by mouth Daily.             gabapentin (NEURONTIN) 800 MG tablet  Take 1 tablet by mouth 3 (Three) Times a Day.             glucose blood test strip  1 each by Other route 4 (Four) Times a Day. Use as instructed             insulin glargine (LANTUS) 100 UNIT/ML injection  Inject 95 Units under the skin Every 12 (Twelve) Hours.             Insulin Lispro (HUMALOG KWIKPEN) 100 UNIT/ML solution pen-injector  Inject 45 Units under the skin 3 (Three) Times a Day Before Meals.             Insulin Pen Needle (NOVOFINE) 30G X 8 MM misc  As directed 4 times daily             ipratropium-albuterol (DUO-NEB) 0.5-2.5 mg/mL nebulizer  Take 3 mL by nebulization 4 (Four) Times a Day. ICD10 code J96.01             lisinopril (PRINIVIL,ZESTRIL) 10 MG tablet  TAKE 1 TABLET BY MOUTH DAILY.             metFORMIN  (GLUMETZA) 1000 MG (MOD) 24 hr tablet  Take 1 tablet by mouth 2 (Two) Times a Day With Meals.             metoprolol tartrate (LOPRESSOR) 50 MG tablet  Take 1 tablet by mouth 2 (Two) Times a Day.             montelukast (SINGULAIR) 10 MG tablet  Take 1 tablet by mouth Every Night.             naproxen (NAPROSYN) 500 MG tablet  Take 1 tablet by mouth 2 (Two) Times a Day With Meals.             nitroglycerin (NITROSTAT) 0.4 MG SL tablet  Place 1 tablet under the tongue As Needed for Chest Pain. Take no more than 3 doses in 15 minutes.             pantoprazole (PROTONIX) 40 MG EC tablet  Take 1 tablet by mouth Daily.             predniSONE (DELTASONE) 20 MG tablet  Take 2 tablets by mouth Daily.             promethazine (PHENERGAN) 25 MG tablet  Take 1 tablet by mouth Every 6 (Six) Hours As Needed for Nausea or Vomiting.                 INSTRUCTIONS:  Activity:   Activity Instructions     Activity as Tolerated                   Diet:   Diet Instructions     Diet: Regular, Consistent Carbohydrate, Cardiac; Thin Liquids, No Restrictions       Discharge Diet:   Regular  Consistent Carbohydrate  Cardiac      Fluid Consistency:  Thin Liquids, No Restrictions               Special instructions: Patient instructed to call MD or return to ED with worsening shortness of breath, chest pain, fever greater than 100.4 degrees F or any other medical concerns..    FOLLOW UP:   Additional Instructions for the Follow-ups that You Need to Schedule     Discharge Follow-up with PCP    As directed    Follow Up Details:  1 week   Has the patient’s follow-up appointment been scheduled and documented in the discharge navigator?:  Patient to schedule, documented in the follow-up section       Referral to Home Health    As directed    Face to Face Visit Date:  4/18/2017   Follow-up Provider for Plan of Care?:  I will be treating the patient on an ongoing basis.  Please send me the Plan of Care for signature.   Follow-up Provider:  HERBERT  LEON   Reason/Clinical Findings:  Sepsis due to UTI   Describe mobility limitations that make leaving home difficult:  fatigues easily during ambulation   Nursing/Therapeutic Services Requested:   Skilled Nursing  Physical Therapy  Occupational Therapy      Skilled nursing orders:   Medication education Comment - Sepsis protocol  COPD management      PT orders:  Gait Training   Weight Bearing Status:  As Tolerated   Occupational orders:  Home safety assessment   Frequency:  1 Week 1             Follow-up Information     Follow up with BRIDGETT [facility] Follow up on 4/26/2017.    Specialty:  Endocrinology    Why:  at 0900 as scheduled    Contact information:    [address] [place] [id number] [phone]          Follow up with Jagdish Baez MD. Go on 4/26/2017.    Specialty:  Family Medicine    Why:  WEDNESDAY APRIL 26TH 4:00 HOSPITAL FOLLOW UP    Contact information:    [address] [place] [address]   [facility] [address] [phone]            PENDING TEST RESULTS AT DISCHARGE: ECHO report    Time: Discharge 60 min    Pauline Martinez MD is the attending at time of discharge, she is aware of the patient's status and agrees with the above discharge summary.      Signature  Leon Delarosa MD PGY2  Family Practice Residency  [place] [id number]  Office: [phone]    This document has been electronically signed by Leon Delarosa MD on April 18, 2017 5:23 PM

## 2017-04-18 NOTE — PLAN OF CARE
Problem: Sepsis (Adult)  Goal: Signs and Symptoms of Listed Potential Problems Will be Absent or Manageable (Sepsis)  Outcome: Ongoing (interventions implemented as appropriate)    Problem: Diabetes, Type 2 (Adult)  Goal: Signs and Symptoms of Listed Potential Problems Will be Absent or Manageable (Diabetes, Type 2)  Outcome: Ongoing (interventions implemented as appropriate)    Problem: Patient Care Overview (Adult)  Goal: Plan of Care Review  Outcome: Ongoing (interventions implemented as appropriate)    04/18/17 0438   Coping/Psychosocial Response Interventions   Plan Of Care Reviewed With patient   Outcome Evaluation   Outcome Summary/Follow up Plan Pt. alert and oriented. Slept and ambulated well during the night.   Patient Care Overview   Progress no change       Goal: Adult Individualization and Mutuality  Outcome: Ongoing (interventions implemented as appropriate)

## 2017-04-18 NOTE — PROGRESS NOTES
FAMILY MEDICINE PROGRESS NOTE  NAME: Yamileth Slater  : 1960  MRN: [id number]     LOS: 2 days   Full Code  PROVIDER OF SERVICE:     Chief Complaint:  Sepsis due to Klebsiella pneumoniae    Subjective     Interval History:  History taken from: patient  Subjective: Patient  feeling much better today.  She is up with physical therapy currently.  The dizziness has resolved at this point she has not had any further episodes of falls.  Review of Systems:   Review of Systems   Constitutional: Negative for activity change, appetite change, fatigue and fever.   HENT: Negative for ear pain and sore throat.    Eyes: Negative for pain and visual disturbance.   Respiratory: Negative for cough and shortness of breath.    Cardiovascular: Positive for chest pain. Negative for palpitations.   Gastrointestinal: Negative for abdominal pain and nausea.   Endocrine: Negative for cold intolerance and heat intolerance.   Genitourinary: Negative for difficulty urinating and dysuria.   Musculoskeletal: Negative for arthralgias and gait problem.   Skin: Negative for color change and rash.   Neurological: Negative for dizziness, weakness and headaches.   Hematological: Negative for adenopathy. Does not bruise/bleed easily.   Psychiatric/Behavioral: Negative for agitation, confusion and sleep disturbance.       Objective     Vital Signs  Temp:  [97.5 °F (36.4 °C)-98.5 °F (36.9 °C)] 97.5 °F (36.4 °C)  Heart Rate:  [72-97] 80  Resp:  [18-20] 18  BP: (109-142)/(57-88) 109/57    Physical Exam  Physical Exam   Constitutional: She is oriented to person, place, and time. She appears well-developed and well-nourished.   HENT:   Head: Normocephalic and atraumatic.   Right Ear: External ear normal.   Left Ear: External ear normal.   Nose: Nose normal.   Mouth/Throat: Oropharynx is clear and moist.   Eyes: Conjunctivae and EOM are normal.   Neck: Normal range of motion. Neck supple.   Cardiovascular: Normal rate, regular rhythm, normal heart  sounds and intact distal pulses.    Pulmonary/Chest: Effort normal and breath sounds normal. She exhibits no tenderness.   Abdominal: Soft. Bowel sounds are normal. She exhibits no distension. There is no tenderness.   Musculoskeletal: Normal range of motion.   Neurological: She is alert and oriented to person, place, and time.   Skin: Skin is warm and dry.   Psychiatric: She has a normal mood and affect. Her speech is normal and behavior is normal. Judgment and thought content normal. Cognition and memory are normal.       Medication Review    Current Facility-Administered Medications:   •  atorvastatin (LIPITOR) tablet 20 mg, 20 mg, Oral, Daily, Marci Jimenez MD, 20 mg at 04/18/17 0808  •  budesonide-formoterol (SYMBICORT) 160-4.5 MCG/ACT inhaler 2 puff, 2 puff, Inhalation, BID - RT, Marci Jimenez MD  •  cefTRIAXone (ROCEPHIN) 1 g/100 mL 0.9% NS (MBP), 1 g, Intravenous, Q24H, Marci Jimenez MD, 1 g at 04/18/17 0900  •  cetirizine (zyrTEC) tablet 5 mg, 5 mg, Oral, Daily, Marci Jimenez MD, 5 mg at 04/18/17 0809  •  clobetasol (TEMOVATE) 0.05 % cream, , Topical, Q12H, Marci Jimenez MD  •  clopidogrel (PLAVIX) tablet 75 mg, 75 mg, Oral, Daily, Marci Jimenez MD, 75 mg at 04/18/17 0809  •  cyclobenzaprine (FLEXERIL) tablet 10 mg, 10 mg, Oral, BID, Marci Jimenez MD, 10 mg at 04/18/17 0808  •  enoxaparin (LOVENOX) syringe 40 mg, 40 mg, Subcutaneous, Daily, Yadira Delarosa MD, 40 mg at 04/18/17 1009  •  ferrous sulfate EC tablet 324 mg, 324 mg, Oral, Daily With Breakfast, Marci Jimenez MD, 324 mg at 04/18/17 0808  •  gabapentin (NEURONTIN) capsule 800 mg, 800 mg, Oral, TID, Marci Jimenez MD, 800 mg at 04/18/17 0809  •  HYDROcodone-acetaminophen (NORCO) 7.5-325 MG per tablet 1 tablet, 1 tablet, Oral, Q6H PRN, Marci Jimenez MD, 1 tablet at 04/17/17 1109  •  insulin aspart (novoLOG) injection 45 Units, 45 Units, Subcutaneous, TID With Meals, Marci  Jose Jimenez MD, 45 Units at 04/18/17 0811  •  insulin detemir (LEVEMIR) injection 95 Units, 95 Units, Subcutaneous, Q12H, Yadira Delarosa MD, 95 Units at 04/18/17 1008  •  ipratropium-albuterol (DUO-NEB) nebulizer solution 3 mL, 3 mL, Nebulization, 4x Daily, Marci Jimenez MD, 3 mL at 04/18/17 0849  •  metoprolol tartrate (LOPRESSOR) tablet 50 mg, 50 mg, Oral, BID, Marci Jimenez MD, 50 mg at 04/18/17 0809  •  montelukast (SINGULAIR) tablet 10 mg, 10 mg, Oral, Nightly, Marci Jimenez MD, 10 mg at 04/17/17 2106  •  nitroglycerin (NITROSTAT) SL tablet 0.4 mg, 0.4 mg, Sublingual, PRN, Marci Jimenez MD  •  nitroglycerin (NITROSTAT) SL tablet 0.4 mg, 0.4 mg, Sublingual, Q5 Min PRN, Aminata Vasquez MD  •  pantoprazole (PROTONIX) EC tablet 40 mg, 40 mg, Oral, Daily, Marci Jimenez MD, 40 mg at 04/18/17 1008  •  promethazine (PHENERGAN) tablet 25 mg, 25 mg, Oral, Q6H PRN, Marci Jimenez MD, 25 mg at 04/17/17 1225  •  sodium chloride 0.9 % flush 1-10 mL, 1-10 mL, Intravenous, PRN, Marci Jimenez MD  •  sodium chloride 0.9 % flush 10 mL, 10 mL, Intravenous, PRN, Angel Hernandez MD  •  sodium chloride 0.9 % infusion, 75 mL/hr, Intravenous, Continuous, Yadira Delarosa MD, Last Rate: 75 mL/hr at 04/18/17 0900, 75 mL/hr at 04/18/17 0900     Diagnostic Data      I reviewed the patient's new clinical results and imaging.      Assessment/Plan     Active Hospital Problems (** Indicates Principal Problem)    Diagnosis Date Noted   • **Sepsis due to Klebsiella pneumoniae cystitis [A41.4] 04/16/2017     Urine culture showed >100,000 CFU/mL Klebsiella Pneumoniae  Resistant to Macrobid & Ampicillin; Sensitive to Rocepin, Levaquin, Ciprofloxacin, Bactrim  -Lactic Acid: 3.3 ->2.2->3.4->1.4 today  -Continue IV fluids   - Rocephin day 3         • Klebsiella cystitis [N30.90, B96.89] 04/17/2017     Urine culture showed >100,000 CFU/mL Klebsiella Pneumoniae  Resistant to Macrobid &  Ampicillin; Sensitive to Rocepin, Levaquin, Ciprofloxacin, Bactrim  - Rocephin day 3     • Fall [W19.XXXA] 04/16/2017     - No LOC, continue telemetry  - CT of head negative  - Unable to get MRI Brain without contrast at our facility because patient does not fit in machine, will order as outpatient   - ECHO - read pending  - Carotid u/s: Proximal left internal carotid artery soft atherosclerotic  plaque causing 50-69% diameter stenosis.     • Acute kidney injury [N17.9] 04/16/2017     -Continue IV fluids  -Admission Creatinine 1.67-> 1.07-> 0.86  -Hold nephrotoxic drugs      • Anemia [D64.9] 04/16/2017     -Chronic  -B12, foalte normal,  low iron, low ferritin, normal TIBC  - iron suplementation     • Hypothyroidism [E03.9] 04/16/2017     -TSH is 5.280  -fT4 1.83 (within normal range)       • Diabetes mellitus type 2 in obese [E11.9, E66.9] 08/24/2016     -Insulin sliding scale   - Novolog 45u TID with meals  - Levemir 95u q12h  - A1C 12.86         • Chronic obstructive pulmonary disease [J44.9] 08/24/2016     - Duo Nebs, Symbicort, PRN oxygen supplementation     • Morbid obesity with BMI of 50.0-59.9, adult [E66.01, Z68.43]      BMI 51.2, dietary consult ordered     • Carotid artery stenosis [I65.29]      Previously DWIGHT 0-15%, LICA 0-15%. LICA stent 5/2014.  - Repeat Carotid u/s 4/17/17: Proximal left internal carotid artery soft atherosclerotic plaque causing 50-69% diameter stenosis.     • MIKE on CPAP [G47.33]      CPAP ordered        Resolved Hospital Problems    Diagnosis Date Noted Date Resolved   • Hypotension [I95.9] 04/16/2017 04/18/2017     - continue IV fluids   -Current SBP is in 113            DVT prophylaxis: Lovenox      Disposition:Home when stable    I have seen the patient.  I have reviewed the notes, assessments, and/or procedures performed by Yadira Delarosa MD , I concur with her/his documentation and assessment and plan for Yamileth Slater.          This document has been  electronically signed by Pauline Martinez MD on April 18, 2017 11:57 AM

## 2017-04-19 ENCOUNTER — OUTSIDE FACILITY SERVICE (OUTPATIENT)
Dept: FAMILY MEDICINE CLINIC | Facility: CLINIC | Age: 57
End: 2017-04-19

## 2017-04-19 LAB
BACTERIA SPEC RESP CULT: NORMAL
GRAM STN SPEC: NORMAL

## 2017-04-19 NOTE — THERAPY DISCHARGE NOTE
Acute Care - Physical Therapy Discharge Summary  [facility]       Patient Name: Yamileth Slater  : 1960  MRN: [id number]    Today's Date: 2017  Onset of Illness/Injury or Date of Surgery Date: 17    Date of Referral to PT: 17  Referring Physician: ISAIAS Delarosa MD.      Admit Date: 2017      PT Recommendation and Plan    Visit Dx:    ICD-10-CM ICD-9-CM   1. Hypotension, unspecified hypotension type I95.9 458.9   2. Sepsis, due to unspecified organism A41.9 038.9     995.91   3. Leukocytosis, unspecified type D72.829 288.60   4. Lactic acid acidosis E87.2 276.2   5. Elevated BUN R79.9 790.6   6. Creatinine elevation R79.89 790.99   7. Lethargic R53.83 780.79   8. Hyperglycemia R73.9 790.29   9. Impaired physical mobility Z74.09 781.99   10. Chronic obstructive pulmonary disease with acute lower respiratory infection J44.0 496     519.8   11. Sepsis due to Klebsiella pneumoniae cystitis A41.4 038.49     995.91   12. Chronic obstructive pulmonary disease, unspecified COPD type J44.9 496             Outcome Measures       17 1022 17 1330 17 1018    How much help from another person do you currently need...    Turning from your back to your side while in flat bed without using bedrails? 4  -TW 4  -CZ 4  -CZ    Moving from lying on back to sitting on the side of a flat bed without bedrails? 4  -TW 4  -CZ 4  -CZ    Moving to and from a bed to a chair (including a wheelchair)? 4  -TW 4  -CZ 4  -CZ    Standing up from a chair using your arms (e.g., wheelchair, bedside chair)? 4  -TW 4  -CZ 4  -CZ    Climbing 3-5 steps with a railing? 3  -TW 3  -CZ 3  -CZ    To walk in hospital room? 4  -TW 3  -CZ 3  -CZ    AM-PAC 6 Clicks Score 23  -TW 22  -CZ 22  -CZ    Tinetti Assessment    Tinetti Assessment   yes  -CZ    Sitting Balance   1  -CZ    Arises   2  -CZ    Attempts to Rise   2  -CZ    Immediate Standing Balance (first 5 sec)   1  -CZ    Standing Balance   1  -CZ     "Sternal Nudge (feet close together)   2  -CZ    Eyes Closed (feet close together)   1  -CZ    Turning 360 Degrees- Steps   1  -CZ    Turning 360 Degrees- Steadiness   0  -CZ    Sitting Down   2  -CZ    Tinetti Balance Score   13  -CZ    Gait Initiation (immediate after told \"go\")   1  -CZ    Step Length- Right Swing   1  -CZ    Step Length- Left Swing   1  -CZ    Foot Clearance- Right Foot   1  -CZ    Foot Clearance- Left Foot   1  -CZ    Step Symmetry   1  -CZ    Step Continuity   0  -CZ    Path (excursion)   1  -CZ    Trunk   0  -CZ    Base of Support   0  -CZ    Gait Score   7  -CZ    Tinetti Total Score   20  -CZ    Tinetti Assistive Device   rolling walker  -CZ    Functional Assessment    Outcome Measure Options AM-PAC 6 Clicks Basic Mobility (PT)  -TW  Tinetti;AM-PAC 6 Clicks Basic Mobility (PT)  -CZ      User Key  (r) = Recorded By, (t) = Taken By, (c) = Cosigned By    Initials Name Provider Type    TW Rashi Ramires, PTA Physical Therapy Assistant    CZ Christopher Arnett, PT Physical Therapist                      IP PT Goals       04/18/17 1022 04/17/17 1330 04/17/17 1018    Gait Training PT STG    Gait Training Goal PT STG, Date Established   04/17/17  -CZ    Gait Training Goal PT STG, Time to Achieve   5 - 7 days  -CZ    Gait Training Goal PT STG, Pickett Level   conditional independence  -CZ    Gait Training Goal PT STG, Assist Device   walker, rolling  -CZ    Gait Training Goal PT STG, Distance to Achieve   150'x1  -CZ    Gait Training Goal PT STG, Date Goal Reviewed 04/18/17  -TW 04/17/17  -CZ 04/17/17  -CZ    Gait Training Goal PT STG, Outcome goal ongoing  -TW goal ongoing  -CZ goal ongoing  -CZ    Strength Goal PT LTG    Strength Goal PT LTG, Date Established   04/17/17  -CZ    Strength Goal PT LTG, Time to Achieve   by discharge  -CZ    Strength Goal PT LTG, Measure to Achieve   MMT 4+/5 BLEs.  -CZ    Strength Goal PT LTG, Functional Goal   To improve standing tolerance.   -CZ    Strength " Goal PT LTG, Date Goal Reviewed 04/18/17  -TW 04/17/17  -CZ 04/17/17  -CZ    Strength Goal PT LTG, Outcome goal ongoing  -TW goal ongoing  -CZ goal ongoing  -CZ    Physical Therapy PT LTG    Physical Therapy PT LTG, Date Established   04/17/17  -CZ    Physical Therapy PT LTG, Time to Achieve   by discharge  -CZ    Physical Therapy PT LTG, Activity Type   Tinetti fall risk assessment  -    Physical Therapy PT LTG, Addisional Goal   Score >/=24/28 or low fall risk.   -CZ    Physical Therapy PT LTG, Date Goal Reviewed 04/18/17  -TW 04/17/17  -CZ 04/17/17  -CZ    Physical Therapy PT LTG, Outcome goal ongoing  -TW goal ongoing  -CZ goal ongoing  -CZ      User Key  (r) = Recorded By, (t) = Taken By, (c) = Cosigned By    Initials Name Provider Type    TW Rashi Ramires, PTA Physical Therapy Assistant    ROSANNA Arnett, PT Physical Therapist              PT Discharge Summary  Anticipated Discharge Disposition: home with home health  Reason for Discharge: Discharge from facility, Per MD order  Outcomes Achieved: Unable to make functional progress toward goals at this time  Discharge Destination: Home with home health      Davy Morales, PTA   4/19/2017

## 2017-04-21 LAB
BACTERIA SPEC AEROBE CULT: NORMAL

## 2017-04-27 ENCOUNTER — OFFICE VISIT (OUTPATIENT)
Dept: FAMILY MEDICINE CLINIC | Facility: CLINIC | Age: 57
End: 2017-04-27

## 2017-04-27 VITALS
WEIGHT: 281 LBS | OXYGEN SATURATION: 96 % | SYSTOLIC BLOOD PRESSURE: 120 MMHG | HEART RATE: 79 BPM | HEIGHT: 62 IN | DIASTOLIC BLOOD PRESSURE: 80 MMHG | BODY MASS INDEX: 51.71 KG/M2

## 2017-04-27 DIAGNOSIS — N30.90 KLEBSIELLA CYSTITIS: Primary | ICD-10-CM

## 2017-04-27 DIAGNOSIS — R47.81 SLURRED SPEECH: ICD-10-CM

## 2017-04-27 DIAGNOSIS — S93.491D HIGH ANKLE SPRAIN, RIGHT, SUBSEQUENT ENCOUNTER: ICD-10-CM

## 2017-04-27 DIAGNOSIS — Z91.89 AT RISK FOR STROKE: ICD-10-CM

## 2017-04-27 DIAGNOSIS — B96.1 KLEBSIELLA CYSTITIS: Primary | ICD-10-CM

## 2017-04-27 PROBLEM — J15.212 PNEUMONIA OF BOTH LUNGS DUE TO METHICILLIN RESISTANT STAPHYLOCOCCUS AUREUS (MRSA): Status: RESOLVED | Noted: 2017-03-16 | Resolved: 2017-04-27

## 2017-04-27 PROBLEM — J18.9 PNEUMONIA OF BOTH LUNGS DUE TO INFECTIOUS ORGANISM: Status: RESOLVED | Noted: 2017-03-16 | Resolved: 2017-04-27

## 2017-04-27 PROCEDURE — 99213 OFFICE O/P EST LOW 20 MIN: CPT | Performed by: FAMILY MEDICINE

## 2017-04-27 NOTE — PROGRESS NOTES
I have reviewed the notes, assessments, and/or procedures performed by Yadira Delarosa MD , I concur with her/his documentation and assessment and plan for Yamileth Slater.        This document has been electronically signed by Pauline Martinez MD on April 27, 2017 2:12 PM

## 2017-04-27 NOTE — PROGRESS NOTES
Subjective:     Yamileth Slater is a 57 y.o. female who presents for hospital follow up after admission for sepsis due to klebsiella pneumoniae cystitis on 4/16/17.  She is taking antibiotics as directed, reports she has 3 more days of medication. She is being followed by home health PT/OT.  Patient reports that PT told her her blood pressure drops when she stands up.  She does complain of lightheadedness/dizziness upon position changes.  She also states her right ankle is still bothering her, imaging done during her admission showed soft tissue swelling, calcaneal spurs, no fracture seen.  She has been elevating the leg at home which has helped some.    Preventative:  Over the past 2 weeks, have you felt down, depressed, or hopeless?No   Over the past 2 weeks, have you felt little interest or pleasure in doing things?No  Clinical depression screening refused by patient.No     On osteoporosis therapy?No     Past Medical Hx:  Past Medical History:   Diagnosis Date   • Anxiety states    • Asthma    • Candidiasis of mouth    • Candidiasis of vulva and vagina    • Carotid artery stenosis     DWIGHT 0-15%, LICA 0-15%. LICA stent 5/2014.   • Chest pain    • Chronic folliculitis    • Chronic obstructive lung disease    • Coronary arteriosclerosis    • Diabetes mellitus     no retinopathy; A1C 9.1      • Dyslipidemia    • Dysphagia    • Encounter for general adult medical examination with abnormal findings    • Essential hypertension    • Excoriated eczema     asteosis/keratosis   • Furuncle of neck    • GERD (gastroesophageal reflux disease)    • History of colon polyps    • Hypertensive disorder    • Infection of skin and subcutaneous tissue     rt perineal abscess   • Infestation by Sarcoptes scabiei deepak hominis    • Kidney stone    • Leukocytosis    • Lower limb anomaly     Abscess of lower limb - ANTX causing n/v      • Mixed hyperlipidemia    • Morbid obesity due to excess calories     BMI 44.5   • Need for  vaccination    • Neurologic disorder associated with diabetes mellitus    • Non-healing surgical wound     LLE EVHa   • Pain     LLE   • Peripheral vascular disease    • Rash    • Shoulder joint pain    • Sleep apnea    • Surgical follow-up care     5/27/14 L carotid stent   • Tobacco dependence syndrome     1/2ppd      • Type 2 diabetes mellitus    • Viral wart     RIGHT middle phalanx   • Vitamin D deficiency        Past Surgical Hx:  Past Surgical History:   Procedure Laterality Date   • CARDIAC CATHETERIZATION  08/09/2013    Severe multivessel CAD with critical lesions noted in the RCA, obtuse marginal two, ramus intermemdious, and LAD as described above. Normal LV systolic function with no wall motion abnormality.    • CARDIAC CATHETERIZATION  05/27/2014     LEFT Carotid Stent    • COLONOSCOPY  05/02/2016    Normal colon.Diverticulosis in the sigmoid colon.No specimens collected.    • CORONARY ARTERY BYPASS GRAFT  11/15/2013    CABG x 3 with LIMA to LAD and coronary endarterectomy to LAD, SVG to Ramus intermedius branch and SVG to PDA.    • ENDOSCOPY  09/23/2015     Esophageal stricture present.Normal stomach.Normal duodenum.    • ENDOSCOPY  11/16/2015    w/ dilatation - Esophageal stricture present,Dilatation performed.Normal stomach and duodenum.    • INCISION AND DRAINAGE ABSCESS  01/02/2016    Incision and drainage of right perineal abscess.    • INCISION AND DRAINAGE ABSCESS  02/18/2014    Incision and Drainage of abscess of left lower leg    • OTHER SURGICAL HISTORY  01/25/2016    DESTRUCTION OF BENIGN LESION      • OTHER SURGICAL HISTORY  04/18/2014    ear/neck - L attempted CEA, Neck Dissection        Health Maintenance:  Health Maintenance   Topic Date Due   • MEDICARE ANNUAL WELLNESS  08/24/2016   • DIABETIC EYE EXAM  08/24/2016   • URINE MICROALBUMIN  10/03/2017   • HEMOGLOBIN A1C  10/16/2017   • DIABETIC FOOT EXAM  11/09/2017   • LIPID PANEL  11/09/2017   • MAMMOGRAM  01/04/2019   • PAP SMEAR   01/04/2020   • COLONOSCOPY  05/02/2021   • TDAP/TD VACCINES (2 - Td) 11/10/2024   • PNEUMOCOCCAL VACCINE (19-64 MEDIUM RISK)  Completed   • HEPATITIS C SCREENING  Completed   • INFLUENZA VACCINE  Addressed       Current Meds:    Current Outpatient Prescriptions:   •  atorvastatin (LIPITOR) 20 MG tablet, Take 1 tablet by mouth Daily., Disp: 30 tablet, Rfl: 3  •  Blood Glucose Monitoring Suppl ([employer] BLOOD GLUCOSE METER) W/DEVICE kit, 1 each 3 (Three) Times a Day., Disp: 1 kit, Rfl: 3  •  budesonide-formoterol (SYMBICORT) 160-4.5 MCG/ACT inhaler, Inhale 2 puffs 2 (Two) Times a Day., Disp: 1 inhaler, Rfl: 6  •  cetirizine (zyrTEC) 10 MG tablet, Take 1 tablet by mouth Daily., Disp: 30 tablet, Rfl: 3  •  ciprofloxacin (CIPRO) 500 MG tablet, Take 1 tablet by mouth 2 (Two) Times a Day for 10 days., Disp: 20 tablet, Rfl: 0  •  clobetasol (CLOBEX) 0.05 % lotion, apply to skin bid x 1-2w then prn. generic, Disp: 118 g, Rfl: 3  •  clopidogrel (PLAVIX) 75 MG tablet, TAKE 1 TABLET BY MOUTH DAILY., Disp: 30 tablet, Rfl: 3  •  cyclobenzaprine (FLEXERIL) 10 MG tablet, Take 1 tablet by mouth 2 (Two) Times a Day., Disp: 60 tablet, Rfl: 2  •  ferrous sulfate 324 (65 FE) MG tablet delayed-release EC tablet, Take 1 tablet by mouth Daily With Breakfast for 29 days., Disp: 30 tablet, Rfl: 3  •  furosemide (LASIX) 40 MG tablet, Take 1 tablet by mouth Daily., Disp: 30 tablet, Rfl: 3  •  gabapentin (NEURONTIN) 800 MG tablet, Take 1 tablet by mouth 3 (Three) Times a Day., Disp: 90 tablet, Rfl: 3  •  glucose blood test strip, 1 each by Other route 4 (Four) Times a Day. Use as instructed, Disp: 100 each, Rfl: 12  •  insulin glargine (LANTUS) 100 UNIT/ML injection, Inject 95 Units under the skin Every 12 (Twelve) Hours., Disp: 1 each, Rfl: 11  •  Insulin Lispro (HUMALOG KWIKPEN) 100 UNIT/ML solution pen-injector, Inject 45 Units under the skin 3 (Three) Times a Day Before Meals., Disp: 60 mL, Rfl: 11  •  Insulin Pen Needle (NOVOFINE) 30G X 8 MM  misc, As directed 4 times daily, Disp: 100 each, Rfl: 11  •  ipratropium-albuterol (DUO-NEB) 0.5-2.5 mg/mL nebulizer, Take 3 mL by nebulization 4 (Four) Times a Day. ICD10 code J96.01, Disp: 360 mL, Rfl: 0  •  lisinopril (PRINIVIL,ZESTRIL) 10 MG tablet, TAKE 1 TABLET BY MOUTH DAILY., Disp: 30 tablet, Rfl: 3  •  metFORMIN (GLUMETZA) 1000 MG (MOD) 24 hr tablet, Take 1 tablet by mouth 2 (Two) Times a Day With Meals., Disp: 60 tablet, Rfl: 3  •  metoprolol tartrate (LOPRESSOR) 50 MG tablet, Take 1 tablet by mouth 2 (Two) Times a Day., Disp: 60 tablet, Rfl: 3  •  montelukast (SINGULAIR) 10 MG tablet, Take 1 tablet by mouth Every Night., Disp: 30 tablet, Rfl: 3  •  naproxen (NAPROSYN) 500 MG tablet, Take 1 tablet by mouth 2 (Two) Times a Day With Meals., Disp: 60 tablet, Rfl: 3  •  nitroglycerin (NITROSTAT) 0.4 MG SL tablet, Place 1 tablet under the tongue As Needed for Chest Pain. Take no more than 3 doses in 15 minutes., Disp: 30 tablet, Rfl: 3  •  pantoprazole (PROTONIX) 40 MG EC tablet, Take 1 tablet by mouth Daily., Disp: 30 tablet, Rfl: 3  •  predniSONE (DELTASONE) 20 MG tablet, Take 2 tablets by mouth Daily., Disp: 6 tablet, Rfl: 0  •  promethazine (PHENERGAN) 25 MG tablet, Take 1 tablet by mouth Every 6 (Six) Hours As Needed for Nausea or Vomiting., Disp: 30 tablet, Rfl: 0    Allergies:  Other    Family Hx:  Family History   Problem Relation Age of Onset   • Coronary artery disease Other    • Diabetes Other    • Heart disease Other    • Hypertension Other         Social History:  Social History     Social History   • Marital status:      Spouse name: N/A   • Number of children: N/A   • Years of education: N/A     Occupational History   • Not on file.     Social History Main Topics   • Smoking status: Current Every Day Smoker     Types: Cigarettes   • Smokeless tobacco: Not on file   • Alcohol use No   • Drug use: No   • Sexual activity: Not on file     Other Topics Concern   • Not on file     Social History  "Narrative       Review of Systems  Review of Systems   Constitutional: Negative for activity change, chills, diaphoresis, fever and unexpected weight change.   HENT: Negative for congestion, postnasal drip and rhinorrhea.    Eyes: Negative for photophobia and visual disturbance.   Respiratory: Negative for cough, chest tightness, shortness of breath and wheezing.    Cardiovascular: Negative for chest pain, palpitations and leg swelling.   Gastrointestinal: Negative for abdominal pain, constipation, diarrhea, nausea and vomiting.   Endocrine: Negative for polydipsia and polyphagia.   Genitourinary: Negative for difficulty urinating, dysuria and urgency.   Musculoskeletal: Negative for arthralgias and myalgias.   Skin: Negative for color change, pallor, rash and wound.   Neurological: Negative for speech difficulty, weakness, light-headedness and headaches.   Psychiatric/Behavioral: Negative for dysphoric mood and sleep disturbance.       Objective:     /80 (BP Location: Right arm, Patient Position: Sitting, Cuff Size: Adult)  Pulse 79  Ht 62\" (157.5 cm)  Wt 281 lb (127 kg)  LMP  (LMP Unknown)  SpO2 96%  BMI 51.4 kg/m2    Physical Exam   Constitutional: She is oriented to person, place, and time. She appears well-developed and well-nourished.   HENT:   Head: Normocephalic and atraumatic.   Nose: Nose normal.   Mouth/Throat: Oropharynx is clear and moist.   Eyes: Conjunctivae and EOM are normal. Pupils are equal, round, and reactive to light.   Neck: Normal range of motion. Neck supple.   Cardiovascular: Normal rate, regular rhythm, normal heart sounds and intact distal pulses.    Pulmonary/Chest: Effort normal and breath sounds normal. No respiratory distress. She has no wheezes. She has no rhonchi.   Abdominal: Soft. Bowel sounds are normal. She exhibits no distension. There is no tenderness.   Musculoskeletal: Normal range of motion.        Right ankle: She exhibits swelling. She exhibits normal range of " motion and no ecchymosis. Tenderness.   Neurological: She is alert and oriented to person, place, and time.   Skin: Skin is warm and dry.   Vitals reviewed.         Assessment/Plan:     Yamileth was seen today for hospital follow-up.    Diagnoses and all orders for this visit:    Klebsiella cystitis - improving, continue antibiotic course     At risk for stroke  -     MRI Brain Without Contrast; Future    High ankle sprain, right, subsequent encounter - continue with elevation of the right ankle, use ice 15 minutes 4 times daily      Follow-up:     Return in about 3 weeks (around 5/18/2017) for DM2.    GOALS:  Control of Blood Glucose    BARRIERS TO GOALS:  Compliance    Preventative:  Female Preventative: Colon cancer screening is up to date  Vaccines:   Tetanus vaccine: up to date  Annual influenza vaccine: not up to date - pt declined   Pneumococcal vaccine: up to date  Hep B vaccine: up to date   Zoster vaccine:N/A    Smoking cessation counseling was provided.  does not drink  eat more fruits and vegetables, decrease soda or juice intake, increase water intake, increase physical activity, reduce portion size, cut out extra servings, reduce fast food intake and have 3 meals a day    RISK SCORE: 4       Signature  Yadira Delarosa MD PGY2  [place] [address]  Office: [phone]    This document has been electronically signed by Yadira Delarosa MD on April 27, 2017 1:55 PM

## 2017-05-16 DIAGNOSIS — E66.9 DIABETES MELLITUS TYPE 2 IN OBESE (HCC): ICD-10-CM

## 2017-05-16 DIAGNOSIS — E11.69 DIABETES MELLITUS TYPE 2 IN OBESE (HCC): ICD-10-CM

## 2017-05-17 RX ORDER — FUROSEMIDE 40 MG/1
TABLET ORAL
Qty: 30 TABLET | Refills: 3 | Status: SHIPPED | OUTPATIENT
Start: 2017-05-17 | End: 2017-06-12 | Stop reason: SDUPTHER

## 2017-05-17 RX ORDER — GABAPENTIN 800 MG/1
TABLET ORAL
Qty: 90 TABLET | Refills: 3 | Status: SHIPPED | OUTPATIENT
Start: 2017-05-17 | End: 2017-09-07

## 2017-05-19 ENCOUNTER — OFFICE VISIT (OUTPATIENT)
Dept: FAMILY MEDICINE CLINIC | Facility: CLINIC | Age: 57
End: 2017-05-19

## 2017-05-19 VITALS
HEIGHT: 62 IN | BODY MASS INDEX: 51.34 KG/M2 | OXYGEN SATURATION: 93 % | DIASTOLIC BLOOD PRESSURE: 80 MMHG | HEART RATE: 83 BPM | WEIGHT: 279 LBS | SYSTOLIC BLOOD PRESSURE: 126 MMHG

## 2017-05-19 DIAGNOSIS — Z91.89 AT RISK FOR STROKE: Primary | ICD-10-CM

## 2017-05-19 DIAGNOSIS — IMO0002 UNCONTROLLED TYPE 2 DIABETES MELLITUS WITH COMPLICATION, WITH LONG-TERM CURRENT USE OF INSULIN: ICD-10-CM

## 2017-05-19 DIAGNOSIS — S93.491D HIGH ANKLE SPRAIN OF RIGHT LOWER EXTREMITY, SUBSEQUENT ENCOUNTER: ICD-10-CM

## 2017-05-19 PROCEDURE — 99213 OFFICE O/P EST LOW 20 MIN: CPT | Performed by: FAMILY MEDICINE

## 2017-05-22 PROBLEM — Z29.89 PROPHYLACTIC HOSPITAL ISOLATION: Status: RESOLVED | Noted: 2017-03-16 | Resolved: 2017-05-22

## 2017-05-22 PROBLEM — Z41.8 PROPHYLACTIC HOSPITAL ISOLATION: Status: RESOLVED | Noted: 2017-03-16 | Resolved: 2017-05-22

## 2017-06-01 DIAGNOSIS — J44.9 CHRONIC OBSTRUCTIVE PULMONARY DISEASE, UNSPECIFIED COPD TYPE (HCC): ICD-10-CM

## 2017-06-01 RX ORDER — BUDESONIDE AND FORMOTEROL FUMARATE DIHYDRATE 160; 4.5 UG/1; UG/1
AEROSOL RESPIRATORY (INHALATION)
Qty: 10.2 G | Refills: 6 | Status: SHIPPED | OUTPATIENT
Start: 2017-06-01 | End: 2018-04-05 | Stop reason: SDUPTHER

## 2017-06-01 RX ORDER — METOPROLOL TARTRATE 50 MG/1
TABLET, FILM COATED ORAL
Qty: 60 TABLET | Refills: 3 | Status: SHIPPED | OUTPATIENT
Start: 2017-06-01 | End: 2017-06-12 | Stop reason: SDUPTHER

## 2017-06-09 ENCOUNTER — OFFICE VISIT (OUTPATIENT)
Dept: FAMILY MEDICINE CLINIC | Facility: CLINIC | Age: 57
End: 2017-06-09

## 2017-06-09 VITALS
HEIGHT: 62 IN | DIASTOLIC BLOOD PRESSURE: 80 MMHG | OXYGEN SATURATION: 95 % | HEART RATE: 88 BPM | TEMPERATURE: 98 F | WEIGHT: 274 LBS | BODY MASS INDEX: 50.42 KG/M2 | SYSTOLIC BLOOD PRESSURE: 130 MMHG

## 2017-06-09 DIAGNOSIS — Z79.4 TYPE 2 DIABETES MELLITUS WITH OTHER SKIN COMPLICATION, WITH LONG-TERM CURRENT USE OF INSULIN (HCC): ICD-10-CM

## 2017-06-09 DIAGNOSIS — E11.628 TYPE 2 DIABETES MELLITUS WITH OTHER SKIN COMPLICATION, WITH LONG-TERM CURRENT USE OF INSULIN (HCC): ICD-10-CM

## 2017-06-09 DIAGNOSIS — E11.69 DIABETES MELLITUS TYPE 2 IN OBESE (HCC): ICD-10-CM

## 2017-06-09 DIAGNOSIS — R52 PAIN: ICD-10-CM

## 2017-06-09 DIAGNOSIS — J30.89 NON-SEASONAL ALLERGIC RHINITIS DUE TO OTHER ALLERGIC TRIGGER: ICD-10-CM

## 2017-06-09 DIAGNOSIS — J41.1 MUCOPURULENT CHRONIC BRONCHITIS (HCC): Primary | ICD-10-CM

## 2017-06-09 DIAGNOSIS — E66.9 DIABETES MELLITUS TYPE 2 IN OBESE (HCC): ICD-10-CM

## 2017-06-09 PROCEDURE — 99213 OFFICE O/P EST LOW 20 MIN: CPT | Performed by: FAMILY MEDICINE

## 2017-06-09 NOTE — PROGRESS NOTES
Subjective:     Yamileth Slater is a 57 y.o. female who presents for evaluation of cough.    Chief Complaint   Patient presents with   • Cough   • Nasal Congestion       HPI Comments: 57 y.o. Female with known COPD who presents for evaluation of productive cough x 2 weeks. She has been producing white sputum at her normal amount.  She reports having some shortness of breath over the last week which has since resolved.  She continues to smoke 0.25ppd.  She has not had fever or chills. No limitation of her daily activities.   Patient reports that her congestion has resolved since she made her appointment.  She feels like her cough is getting better.      The following portions of the patient's history were reviewed and updated as appropriate: allergies, current medications, past family history, past medical history, past social history, past surgical history and problem list.    Past Medical History:   Diagnosis Date   • Anxiety states    • Asthma    • Carotid artery stenosis     DWIGHT 0-15%, LICA 0-15%. LICA stent 5/2014.   • Chronic folliculitis    • Chronic obstructive lung disease    • Coronary arteriosclerosis    • Diabetes mellitus     no retinopathy; A1C 9.1      • Dyslipidemia    • Essential hypertension    • Excoriated eczema     asteosis/keratosis   • GERD (gastroesophageal reflux disease)    • History of colon polyps    • Hypertensive disorder    • Kidney stone    • Mixed hyperlipidemia    • Morbid obesity due to excess calories     BMI 44.5   • Neurologic disorder associated with diabetes mellitus    • Non-healing surgical wound     LLE EVHa   • Peripheral vascular disease    • Sleep apnea    • Tobacco dependence syndrome     1/2ppd      • Type 2 diabetes mellitus    • Vitamin D deficiency        Past Surgical History:   Procedure Laterality Date   • CARDIAC CATHETERIZATION  08/09/2013    Severe multivessel CAD with critical lesions noted in the RCA, obtuse marginal two, ramus intermemdious, and LAD as  described above. Normal LV systolic function with no wall motion abnormality.    • CARDIAC CATHETERIZATION  05/27/2014     LEFT Carotid Stent    • CAROTID STENT Left 05/27/2014   • COLONOSCOPY  05/02/2016    Normal colon.Diverticulosis in the sigmoid colon.No specimens collected.    • CORONARY ARTERY BYPASS GRAFT  11/15/2013    CABG x 3 with LIMA to LAD and coronary endarterectomy to LAD, SVG to Ramus intermedius branch and SVG to PDA.    • ENDOSCOPY  09/23/2015     Esophageal stricture present.Normal stomach.Normal duodenum.    • ENDOSCOPY  11/16/2015    w/ dilatation - Esophageal stricture present,Dilatation performed.Normal stomach and duodenum.    • INCISION AND DRAINAGE ABSCESS  01/02/2016    Incision and drainage of right perineal abscess.    • INCISION AND DRAINAGE ABSCESS  02/18/2014    Incision and Drainage of abscess of left lower leg    • OTHER SURGICAL HISTORY  01/25/2016    DESTRUCTION OF BENIGN LESION      • OTHER SURGICAL HISTORY  04/18/2014    ear/neck - L attempted CEA, Neck Dissection        Family History   Problem Relation Age of Onset   • Coronary artery disease Other    • Diabetes Other    • Heart disease Other    • Hypertension Other          Current Outpatient Prescriptions:   •  atorvastatin (LIPITOR) 20 MG tablet, Take 1 tablet by mouth Daily., Disp: 30 tablet, Rfl: 3  •  Blood Glucose Monitoring Suppl ([employer] BLOOD GLUCOSE METER) W/DEVICE kit, 1 each 3 (Three) Times a Day., Disp: 1 kit, Rfl: 3  •  cetirizine (zyrTEC) 10 MG tablet, Take 1 tablet by mouth Daily., Disp: 30 tablet, Rfl: 3  •  clobetasol (CLOBEX) 0.05 % lotion, apply to skin bid x 1-2w then prn. generic, Disp: 118 g, Rfl: 3  •  clopidogrel (PLAVIX) 75 MG tablet, TAKE 1 TABLET BY MOUTH DAILY., Disp: 30 tablet, Rfl: 3  •  cyclobenzaprine (FLEXERIL) 10 MG tablet, Take 1 tablet by mouth 2 (Two) Times a Day., Disp: 60 tablet, Rfl: 2  •  furosemide (LASIX) 40 MG tablet, TAKE 1 TABLET BY MOUTH DAILY., Disp: 30 tablet, Rfl: 3  •   gabapentin (NEURONTIN) 800 MG tablet, Take 1 tablet by mouth 3 (Three) Times a Day., Disp: 90 tablet, Rfl: 3  •  gabapentin (NEURONTIN) 800 MG tablet, TAKE 1 TABLET BY MOUTH 3 (THREE) TIMES A DAY., Disp: 90 tablet, Rfl: 3  •  glucose blood test strip, 1 each by Other route 4 (Four) Times a Day. Use as instructed, Disp: 100 each, Rfl: 12  •  insulin glargine (LANTUS) 100 UNIT/ML injection, Inject 95 Units under the skin Every 12 (Twelve) Hours., Disp: 1 each, Rfl: 11  •  Insulin Lispro (HUMALOG KWIKPEN) 100 UNIT/ML solution pen-injector, Inject 45 Units under the skin 3 (Three) Times a Day Before Meals., Disp: 60 mL, Rfl: 11  •  Insulin Pen Needle (NOVOFINE) 30G X 8 MM misc, As directed 4 times daily, Disp: 100 each, Rfl: 11  •  ipratropium-albuterol (DUO-NEB) 0.5-2.5 mg/mL nebulizer, Take 3 mL by nebulization 4 (Four) Times a Day. ICD10 code J96.01, Disp: 360 mL, Rfl: 0  •  lisinopril (PRINIVIL,ZESTRIL) 10 MG tablet, TAKE 1 TABLET BY MOUTH DAILY., Disp: 30 tablet, Rfl: 3  •  metFORMIN (GLUMETZA) 1000 MG (MOD) 24 hr tablet, Take 1 tablet by mouth 2 (Two) Times a Day With Meals., Disp: 60 tablet, Rfl: 3  •  metoprolol tartrate (LOPRESSOR) 50 MG tablet, TAKE 1 TABLET BY MOUTH 2 (TWO) TIMES A DAY., Disp: 60 tablet, Rfl: 3  •  montelukast (SINGULAIR) 10 MG tablet, Take 1 tablet by mouth Every Night., Disp: 30 tablet, Rfl: 3  •  naproxen (NAPROSYN) 500 MG tablet, Take 1 tablet by mouth 2 (Two) Times a Day With Meals., Disp: 60 tablet, Rfl: 3  •  nitroglycerin (NITROSTAT) 0.4 MG SL tablet, Place 1 tablet under the tongue As Needed for Chest Pain. Take no more than 3 doses in 15 minutes., Disp: 30 tablet, Rfl: 3  •  pantoprazole (PROTONIX) 40 MG EC tablet, Take 1 tablet by mouth Daily., Disp: 30 tablet, Rfl: 3  •  predniSONE (DELTASONE) 20 MG tablet, Take 2 tablets by mouth Daily., Disp: 6 tablet, Rfl: 0  •  promethazine (PHENERGAN) 25 MG tablet, Take 1 tablet by mouth Every 6 (Six) Hours As Needed for Nausea or Vomiting.,  "Disp: 30 tablet, Rfl: 0  •  SYMBICORT 160-4.5 MCG/ACT inhaler, INHALE 2 PUFFS INTO LUNGS 2 (TWO) TIMES A DAY-RINSE MOUTH AFTER USE, Disp: 10.2 g, Rfl: 6    Allergies   Allergen Reactions   • Other      Bandaids, MRSA, SURECLOSE       Social History     Social History   • Marital status:      Spouse name: N/A   • Number of children: N/A   • Years of education: N/A     Occupational History   • Not on file.     Social History Main Topics   • Smoking status: Current Every Day Smoker     Packs/day: 0.25     Types: Cigarettes   • Smokeless tobacco: Never Used   • Alcohol use No   • Drug use: No   • Sexual activity: Not on file     Other Topics Concern   • Not on file     Social History Narrative   • No narrative on file       Review of Systems   Constitutional: Negative for activity change, appetite change, chills, fatigue, fever and unexpected weight change.   HENT: Positive for congestion, rhinorrhea, sinus pressure and sneezing. Negative for sore throat.    Eyes: Negative for photophobia and visual disturbance.   Respiratory: Positive for cough. Negative for shortness of breath and wheezing.    Cardiovascular: Negative for chest pain, palpitations and leg swelling.   Gastrointestinal: Negative for abdominal pain, constipation, diarrhea, nausea and vomiting.   Endocrine: Negative for polydipsia, polyphagia and polyuria.   Genitourinary: Negative for decreased urine volume, difficulty urinating, dysuria and urgency.   Musculoskeletal: Negative for gait problem and joint swelling.   Skin: Negative for color change and rash.   Neurological: Negative for dizziness, weakness, light-headedness and headaches.   Psychiatric/Behavioral: Negative for decreased concentration. The patient is not nervous/anxious.        Objective:     /80  Pulse 88  Temp 98 °F (36.7 °C)  Ht 62\" (157.5 cm)  Wt 274 lb (124 kg)  LMP  (LMP Unknown)  SpO2 95%  BMI 50.12 kg/m2    Physical Exam   Constitutional: She is oriented to " person, place, and time. She appears well-developed and well-nourished.   HENT:   Head: Normocephalic and atraumatic.   Nose: Mucosal edema and rhinorrhea present. Right sinus exhibits no maxillary sinus tenderness and no frontal sinus tenderness. Left sinus exhibits no maxillary sinus tenderness and no frontal sinus tenderness.   Mouth/Throat: Oropharynx is clear and moist.   Eyes: Conjunctivae and EOM are normal. Pupils are equal, round, and reactive to light.   Neck: Normal range of motion. Neck supple.   Cardiovascular: Normal rate, regular rhythm, normal heart sounds and intact distal pulses.    Pulmonary/Chest: Effort normal and breath sounds normal. No respiratory distress. She has no wheezes.   Abdominal: Soft. Bowel sounds are normal. She exhibits no distension. There is no tenderness.   Musculoskeletal: Normal range of motion.   Neurological: She is alert and oriented to person, place, and time.   Skin: Skin is warm and dry.   Psychiatric: She has a normal mood and affect.   Vitals reviewed.      Assessment/Plan:     Yamileth was seen today for cough and nasal congestion.    Diagnoses and all orders for this visit:    Mucopurulent chronic bronchitis       -      Continue symbicort, contact MD if you develop change in sputum color, fever or shortness of breath.    Non-seasonal allergic rhinitis due to other allergic trigger  -     cetirizine (zyrTEC) 10 MG tablet; Take 1 tablet by mouth Daily.      Return in about 1 month (around 7/9/2017) for Recheck DM2.     Preventative:  Female Preventative: Colon cancer screening is up to date  Vaccines:   Tetanus vaccine: up to date  Annual influenza vaccine: not up to date - pt declined   Pneumococcal vaccine: up to date  Hep B vaccine: up to date   Zoster vaccine:N/A    Smoking cessation counseling was provided.  does not drink  eat more fruits and vegetables, decrease soda or juice intake, increase water intake, increase physical activity, reduce portion size, cut out  extra servings, reduce fast food intake and have 3 meals a day    RISK SCORE: 4     Signature  Yadira Delarosa MD PGY2  [place] [address]  Office: [phone]    This document has been electronically signed by Yadira Delarosa MD on June 9, 2017 4:16 PM

## 2017-06-09 NOTE — PROGRESS NOTES
I have reviewed the notes, assessments, and/or procedures performed by Yadira Delarosa MD , I concur with her/his documentation and assessment and plan for Yamileth Slater.        This document has been electronically signed by Pauline Martinez MD on June 9, 2017 4:35 PM

## 2017-06-12 RX ORDER — CETIRIZINE HYDROCHLORIDE 10 MG/1
10 TABLET ORAL DAILY
Qty: 30 TABLET | Refills: 3 | Status: SHIPPED | OUTPATIENT
Start: 2017-06-12 | End: 2017-11-10 | Stop reason: SDUPTHER

## 2017-06-12 RX ORDER — METOPROLOL TARTRATE 50 MG/1
50 TABLET, FILM COATED ORAL 2 TIMES DAILY
Qty: 60 TABLET | Refills: 3 | Status: SHIPPED | OUTPATIENT
Start: 2017-06-12 | End: 2017-09-07 | Stop reason: SDUPTHER

## 2017-06-12 RX ORDER — CYCLOBENZAPRINE HCL 10 MG
10 TABLET ORAL 2 TIMES DAILY
Qty: 60 TABLET | Refills: 2 | Status: SHIPPED | OUTPATIENT
Start: 2017-06-12 | End: 2017-07-10 | Stop reason: SDUPTHER

## 2017-06-12 RX ORDER — INSULIN GLARGINE 100 [IU]/ML
95 INJECTION, SOLUTION SUBCUTANEOUS EVERY 12 HOURS
Qty: 1 EACH | Refills: 11 | Status: SHIPPED | OUTPATIENT
Start: 2017-06-12 | End: 2017-09-07 | Stop reason: SDUPTHER

## 2017-06-12 RX ORDER — PANTOPRAZOLE SODIUM 40 MG/1
40 TABLET, DELAYED RELEASE ORAL DAILY
Qty: 30 TABLET | Refills: 3 | Status: SHIPPED | OUTPATIENT
Start: 2017-06-12 | End: 2017-09-07 | Stop reason: SDUPTHER

## 2017-06-12 RX ORDER — LISINOPRIL 10 MG/1
10 TABLET ORAL DAILY
Qty: 30 TABLET | Refills: 3 | Status: SHIPPED | OUTPATIENT
Start: 2017-06-12 | End: 2017-07-10 | Stop reason: SDUPTHER

## 2017-06-12 RX ORDER — FUROSEMIDE 40 MG/1
40 TABLET ORAL DAILY
Qty: 30 TABLET | Refills: 3 | Status: SHIPPED | OUTPATIENT
Start: 2017-06-12 | End: 2017-09-07 | Stop reason: SDUPTHER

## 2017-06-12 RX ORDER — CLOPIDOGREL BISULFATE 75 MG/1
75 TABLET ORAL DAILY
Qty: 30 TABLET | Refills: 3 | Status: SHIPPED | OUTPATIENT
Start: 2017-06-12 | End: 2017-10-24 | Stop reason: SDUPTHER

## 2017-06-12 RX ORDER — NAPROXEN 500 MG/1
500 TABLET ORAL 2 TIMES DAILY WITH MEALS
Qty: 60 TABLET | Refills: 3 | Status: SHIPPED | OUTPATIENT
Start: 2017-06-12 | End: 2017-11-10 | Stop reason: SDUPTHER

## 2017-06-30 DIAGNOSIS — E11.69 DIABETES MELLITUS TYPE 2 IN OBESE (HCC): ICD-10-CM

## 2017-06-30 DIAGNOSIS — E66.9 DIABETES MELLITUS TYPE 2 IN OBESE (HCC): ICD-10-CM

## 2017-06-30 RX ORDER — CLOPIDOGREL BISULFATE 75 MG/1
TABLET ORAL
Qty: 30 TABLET | Refills: 3 | Status: SHIPPED | OUTPATIENT
Start: 2017-06-30 | End: 2018-02-02 | Stop reason: SDUPTHER

## 2017-06-30 RX ORDER — POTASSIUM CHLORIDE 750 MG/1
TABLET, FILM COATED, EXTENDED RELEASE ORAL
Qty: 30 TABLET | Refills: 3 | Status: SHIPPED | OUTPATIENT
Start: 2017-06-30 | End: 2017-07-10

## 2017-06-30 RX ORDER — LISINOPRIL 10 MG/1
TABLET ORAL
Qty: 30 TABLET | Refills: 3 | Status: SHIPPED | OUTPATIENT
Start: 2017-06-30 | End: 2017-07-10

## 2017-07-10 ENCOUNTER — APPOINTMENT (OUTPATIENT)
Dept: LAB | Facility: HOSPITAL | Age: 57
End: 2017-07-10

## 2017-07-10 ENCOUNTER — OFFICE VISIT (OUTPATIENT)
Dept: FAMILY MEDICINE CLINIC | Facility: CLINIC | Age: 57
End: 2017-07-10

## 2017-07-10 VITALS
SYSTOLIC BLOOD PRESSURE: 132 MMHG | OXYGEN SATURATION: 95 % | HEIGHT: 62 IN | HEART RATE: 70 BPM | WEIGHT: 283.8 LBS | BODY MASS INDEX: 52.23 KG/M2 | DIASTOLIC BLOOD PRESSURE: 80 MMHG

## 2017-07-10 DIAGNOSIS — IMO0002 UNCONTROLLED TYPE 2 DIABETES MELLITUS WITH COMPLICATION, WITH LONG-TERM CURRENT USE OF INSULIN: ICD-10-CM

## 2017-07-10 DIAGNOSIS — E11.69 DIABETES MELLITUS TYPE 2 IN OBESE (HCC): ICD-10-CM

## 2017-07-10 DIAGNOSIS — J44.0 CHRONIC OBSTRUCTIVE PULMONARY DISEASE WITH ACUTE LOWER RESPIRATORY INFECTION (HCC): Primary | ICD-10-CM

## 2017-07-10 DIAGNOSIS — E66.9 DIABETES MELLITUS TYPE 2 IN OBESE (HCC): ICD-10-CM

## 2017-07-10 LAB — HBA1C MFR BLD: 12.92 % (ref 4–5.6)

## 2017-07-10 PROCEDURE — 83036 HEMOGLOBIN GLYCOSYLATED A1C: CPT | Performed by: FAMILY MEDICINE

## 2017-07-10 PROCEDURE — 36415 COLL VENOUS BLD VENIPUNCTURE: CPT | Performed by: FAMILY MEDICINE

## 2017-07-10 PROCEDURE — 99213 OFFICE O/P EST LOW 20 MIN: CPT | Performed by: FAMILY MEDICINE

## 2017-07-10 RX ORDER — CEFUROXIME AXETIL 500 MG/1
500 TABLET ORAL 2 TIMES DAILY
Qty: 20 TABLET | Refills: 0 | Status: SHIPPED | OUTPATIENT
Start: 2017-07-10 | End: 2017-07-20

## 2017-07-10 RX ORDER — MONTELUKAST SODIUM 10 MG/1
10 TABLET ORAL NIGHTLY
Qty: 30 TABLET | Refills: 3 | Status: SHIPPED | OUTPATIENT
Start: 2017-07-10 | End: 2017-12-06 | Stop reason: SDUPTHER

## 2017-07-10 RX ORDER — METFORMIN HYDROCHLORIDE 1000 MG/1
1000 TABLET, FILM COATED, EXTENDED RELEASE ORAL 2 TIMES DAILY WITH MEALS
Qty: 60 TABLET | Refills: 3 | Status: SHIPPED | OUTPATIENT
Start: 2017-07-10 | End: 2018-02-02 | Stop reason: SDUPTHER

## 2017-07-10 RX ORDER — LISINOPRIL 10 MG/1
10 TABLET ORAL DAILY
Qty: 30 TABLET | Refills: 3 | Status: SHIPPED | OUTPATIENT
Start: 2017-07-10 | End: 2017-10-24

## 2017-07-10 RX ORDER — CYCLOBENZAPRINE HCL 10 MG
10 TABLET ORAL 2 TIMES DAILY
Qty: 60 TABLET | Refills: 2 | Status: SHIPPED | OUTPATIENT
Start: 2017-07-10 | End: 2018-02-02 | Stop reason: SDUPTHER

## 2017-07-10 NOTE — PROGRESS NOTES
I discussed the case with the resident and I agree with their assessment which includes bronchitis and DM type II  and plan as outlined by Dr. Delarosa .  Quan Brewer MD.

## 2017-07-10 NOTE — PROGRESS NOTES
"  Subjective:     Yamileth Slater is a 57 y.o. female who presents for refills of medications for uncontrolled DM2 with hyperglycemia.  She follows with Endocrinology for diabetes medication management.    Diabetes HPI:  The patient was initially diagnosed with Type 2 diabetes mellitus based on the following criteria:  Elevated A1C and random blood glucose.    Known diabetic complications: peripheral neuropathy and cardiovascular disease  Cardiovascular risk factors: diabetes mellitus, dyslipidemia, hypertension, obesity (BMI >= 30 kg/m2), sedentary lifestyle and smoking/ tobacco exposure  Current diabetic medications include Metformin 1000 mg BID; Lantus 90u qhs & 90u qAM; Humalog 45-60u sliding scale with meals.     Eye exam current (within one year): no - states she had to reschedule with Dr Pham because she missed her appointment  Weight trend: increasing steadily  Prior visit with dietician: yes - in hospital  Current diet: in general, an \"unhealthy\" diet, but has been avoiding [employer] as recommended  Current exercise: none    Current monitoring regimen: home blood tests - daily  Home blood sugar records: checks irregularly, sometimes fasting, sometimes during the day.    Any episodes of hypoglycemia? no     ACE inhibitor or angiotensin II receptor blocker?     Yes     lisinopril (generic)        Statin?     Yes    ASA?     Yes     Hemoglobin A1C   Date Value   04/16/2017 12.86 % (H)   12/12/2016 11.8 %TotHgb (H)   11/17/2016 10.9 %TotHgb (H)   08/09/2016 9.1 %TotHgb (H)       ASSOCIATED SYMPTOMS:     FatigueNo   MalaiseNo    DiaphoresisNo    Weight lossNo    Weight gainYes    Visual impairmentNo   Chest painNo    PalpitationsNo    TachycardiaNo   ClaudicationNo    PolyphagiaYes    PolydipsiaYes    PolyuriaYes    NumbnessYes    Foot painNo    Skin lesionsNo    Memory lossNo     COMORBID CONDITIONS:     ObesityYes    HypothyroidismNo    HyperlipidemiaYes    CADYes    Cerebrovascular diseaseNo   PVDNo "    HTNYes    Sexual dysfunctionNo    DepressionNo     Acute illness:  She would also like to talk about a recurrent cough today, patient has known COPD and continues to smoke 0.5ppd.  She reports some chest congestion and cough for the past week.  She reports that she has been using her inhalers as directed.  Denies fever or chills.     The following portions of the patient's history were reviewed and updated as appropriate: allergies, current medications, past family history, past medical history, past social history, past surgical history and problem list.    Preventative:  Over the past 2 weeks, have you felt down, depressed, or hopeless?No   Over the past 2 weeks, have you felt little interest or pleasure in doing things?No  Clinical depression screening refused by patient.No     On osteoporosis therapy?No     Past Medical Hx:  Past Medical History:   Diagnosis Date   • Anxiety states    • Asthma    • Carotid artery stenosis     DWIGHT 0-15%, LICA 0-15%. LICA stent 5/2014.   • Chronic folliculitis    • Chronic obstructive lung disease    • Coronary arteriosclerosis    • Diabetes mellitus     no retinopathy; A1C 9.1      • Dyslipidemia    • Essential hypertension    • Excoriated eczema     asteosis/keratosis   • GERD (gastroesophageal reflux disease)    • History of colon polyps    • Hypertensive disorder    • Kidney stone    • Mixed hyperlipidemia    • Morbid obesity due to excess calories     BMI 44.5   • Neurologic disorder associated with diabetes mellitus    • Non-healing surgical wound     LLE EVHa   • Peripheral vascular disease    • Sleep apnea    • Tobacco dependence syndrome     1/2ppd      • Type 2 diabetes mellitus    • Vitamin D deficiency        Past Surgical Hx:  Past Surgical History:   Procedure Laterality Date   • CARDIAC CATHETERIZATION  08/09/2013    Severe multivessel CAD with critical lesions noted in the RCA, obtuse marginal two, ramus intermemdious, and LAD as described above. Normal LV  systolic function with no wall motion abnormality.    • CARDIAC CATHETERIZATION  05/27/2014     LEFT Carotid Stent    • CAROTID STENT Left 05/27/2014   • COLONOSCOPY  05/02/2016    Normal colon.Diverticulosis in the sigmoid colon.No specimens collected.    • CORONARY ARTERY BYPASS GRAFT  11/15/2013    CABG x 3 with LIMA to LAD and coronary endarterectomy to LAD, SVG to Ramus intermedius branch and SVG to PDA.    • ENDOSCOPY  09/23/2015     Esophageal stricture present.Normal stomach.Normal duodenum.    • ENDOSCOPY  11/16/2015    w/ dilatation - Esophageal stricture present,Dilatation performed.Normal stomach and duodenum.    • INCISION AND DRAINAGE ABSCESS  01/02/2016    Incision and drainage of right perineal abscess.    • INCISION AND DRAINAGE ABSCESS  02/18/2014    Incision and Drainage of abscess of left lower leg    • OTHER SURGICAL HISTORY  01/25/2016    DESTRUCTION OF BENIGN LESION      • OTHER SURGICAL HISTORY  04/18/2014    ear/neck - L attempted CEA, Neck Dissection        Health Maintenance:  Immunization History   Administered Date(s) Administered   • Hepatitis B 06/11/2015, 10/20/2015, 05/13/2016   • Influenza Split High Dose Preservative Free IM 11/10/2014   • Pneumococcal Polysaccharide 01/01/2012   • Tdap 11/10/2014     Health Maintenance   Topic Date Due   • MEDICARE ANNUAL WELLNESS  08/24/2016   • DIABETIC EYE EXAM  08/24/2016   • INFLUENZA VACCINE  08/01/2017   • URINE MICROALBUMIN  10/03/2017   • HEMOGLOBIN A1C  10/16/2017   • DIABETIC FOOT EXAM  11/09/2017   • LIPID PANEL  11/09/2017   • MAMMOGRAM  01/04/2019   • PAP SMEAR  01/04/2020   • COLONOSCOPY  05/02/2021   • TDAP/TD VACCINES (2 - Td) 11/10/2024   • PNEUMOCOCCAL VACCINE (19-64 MEDIUM RISK)  Completed   • HEPATITIS C SCREENING  Completed       Current Meds:    Current Outpatient Prescriptions:   •  atorvastatin (LIPITOR) 20 MG tablet, Take 1 tablet by mouth Daily., Disp: 30 tablet, Rfl: 3  •  Blood Glucose Monitoring Suppl ([employer] BLOOD  GLUCOSE METER) W/DEVICE kit, 1 each 3 (Three) Times a Day., Disp: 1 kit, Rfl: 3  •  cetirizine (zyrTEC) 10 MG tablet, Take 1 tablet by mouth Daily., Disp: 30 tablet, Rfl: 3  •  clobetasol (CLOBEX) 0.05 % lotion, apply to skin bid x 1-2w then prn. generic, Disp: 118 g, Rfl: 3  •  clopidogrel (PLAVIX) 75 MG tablet, Take 1 tablet by mouth Daily., Disp: 30 tablet, Rfl: 3  •  clopidogrel (PLAVIX) 75 MG tablet, TAKE 1 TABLET BY MOUTH DAILY., Disp: 30 tablet, Rfl: 3  •  cyclobenzaprine (FLEXERIL) 10 MG tablet, Take 1 tablet by mouth 2 (Two) Times a Day., Disp: 60 tablet, Rfl: 2  •  furosemide (LASIX) 40 MG tablet, Take 1 tablet by mouth Daily., Disp: 30 tablet, Rfl: 3  •  gabapentin (NEURONTIN) 800 MG tablet, Take 1 tablet by mouth 3 (Three) Times a Day., Disp: 90 tablet, Rfl: 3  •  gabapentin (NEURONTIN) 800 MG tablet, TAKE 1 TABLET BY MOUTH 3 (THREE) TIMES A DAY., Disp: 90 tablet, Rfl: 3  •  glucose blood test strip, 1 each by Other route 4 (Four) Times a Day. For blood glucose monitoring. Dx: E11.628, Disp: 100 each, Rfl: 12  •  insulin glargine (LANTUS) 100 UNIT/ML injection, Inject 95 Units under the skin Every 12 (Twelve) Hours., Disp: 1 each, Rfl: 11  •  Insulin Lispro (HUMALOG KWIKPEN) 100 UNIT/ML solution pen-injector, Inject 45 Units under the skin 3 (Three) Times a Day Before Meals., Disp: 60 mL, Rfl: 11  •  Insulin Pen Needle (NOVOFINE) 30G X 8 MM misc, As directed 4 times daily, Disp: 100 each, Rfl: 11  •  ipratropium-albuterol (DUO-NEB) 0.5-2.5 mg/mL nebulizer, Take 3 mL by nebulization 4 (Four) Times a Day. ICD10 code J96.01, Disp: 360 mL, Rfl: 0  •  lisinopril (PRINIVIL,ZESTRIL) 10 MG tablet, Take 1 tablet by mouth Daily., Disp: 30 tablet, Rfl: 3  •  lisinopril (PRINIVIL,ZESTRIL) 10 MG tablet, TAKE 1 TABLET BY MOUTH DAILY., Disp: 30 tablet, Rfl: 3  •  metFORMIN (GLUMETZA) 1000 MG (MOD) 24 hr tablet, Take 1 tablet by mouth 2 (Two) Times a Day With Meals., Disp: 60 tablet, Rfl: 3  •  metoprolol tartrate  (LOPRESSOR) 50 MG tablet, Take 1 tablet by mouth 2 (Two) Times a Day., Disp: 60 tablet, Rfl: 3  •  montelukast (SINGULAIR) 10 MG tablet, Take 1 tablet by mouth Every Night., Disp: 30 tablet, Rfl: 3  •  naproxen (NAPROSYN) 500 MG tablet, Take 1 tablet by mouth 2 (Two) Times a Day With Meals., Disp: 60 tablet, Rfl: 3  •  nitroglycerin (NITROSTAT) 0.4 MG SL tablet, Place 1 tablet under the tongue As Needed for Chest Pain. Take no more than 3 doses in 15 minutes., Disp: 30 tablet, Rfl: 3  •  pantoprazole (PROTONIX) 40 MG EC tablet, Take 1 tablet by mouth Daily., Disp: 30 tablet, Rfl: 3  •  promethazine (PHENERGAN) 25 MG tablet, Take 1 tablet by mouth Every 6 (Six) Hours As Needed for Nausea or Vomiting., Disp: 30 tablet, Rfl: 0  •  SYMBICORT 160-4.5 MCG/ACT inhaler, INHALE 2 PUFFS INTO LUNGS 2 (TWO) TIMES A DAY-RINSE MOUTH AFTER USE, Disp: 10.2 g, Rfl: 6    Allergies:  Other    Family Hx:  Family History   Problem Relation Age of Onset   • Coronary artery disease Other    • Diabetes Other    • Heart disease Other    • Hypertension Other         Social History:  Social History     Social History   • Marital status:      Spouse name: N/A   • Number of children: N/A   • Years of education: N/A     Occupational History   • Not on file.     Social History Main Topics   • Smoking status: Current Every Day Smoker     Packs/day: 0.25     Types: Cigarettes   • Smokeless tobacco: Never Used   • Alcohol use No   • Drug use: No   • Sexual activity: Not on file     Other Topics Concern   • Not on file     Social History Narrative       Review of Systems  Review of Systems   Constitutional: Negative for activity change, appetite change, chills, diaphoresis, fatigue and unexpected weight change.   HENT: Negative for congestion, facial swelling, postnasal drip, rhinorrhea, sinus pressure, sneezing and sore throat.    Eyes: Negative for photophobia and visual disturbance.   Respiratory: Positive for cough. Negative for chest  "tightness, shortness of breath and wheezing.    Cardiovascular: Negative for chest pain, palpitations and leg swelling.   Gastrointestinal: Negative for abdominal pain, constipation, diarrhea, nausea and vomiting.   Endocrine: Positive for polydipsia, polyphagia and polyuria.   Genitourinary: Negative for difficulty urinating, dysuria and urgency.   Musculoskeletal: Negative for arthralgias, gait problem, joint swelling and neck stiffness.   Skin: Negative for color change, pallor and rash.   Neurological: Positive for numbness. Negative for seizures, syncope, speech difficulty, weakness, light-headedness and headaches.   Psychiatric/Behavioral: Negative for decreased concentration, dysphoric mood, sleep disturbance and suicidal ideas. The patient is not nervous/anxious.        Objective:     /80 (BP Location: Left arm, Patient Position: Sitting, Cuff Size: Adult)  Pulse 70  Ht 62\" (157.5 cm)  Wt 283 lb 12.8 oz (129 kg)  LMP  (LMP Unknown)  SpO2 95%  BMI 51.91 kg/m2    Physical Exam   Constitutional: She is oriented to person, place, and time. No distress.   Obese   HENT:   Head: Normocephalic and atraumatic.   Nose: Nose normal.   Mouth/Throat: Oropharynx is clear and moist. No oropharyngeal exudate.   Eyes: Conjunctivae and EOM are normal. Pupils are equal, round, and reactive to light. No scleral icterus.   Neck: Normal range of motion. Neck supple. No tracheal deviation present.   Cardiovascular: Normal rate, regular rhythm, normal heart sounds and intact distal pulses.    Pulmonary/Chest: Effort normal. No respiratory distress. She has wheezes. She exhibits no tenderness.   Abdominal: Soft. Bowel sounds are normal. She exhibits no distension. There is no tenderness.   Musculoskeletal: Normal range of motion. She exhibits no edema.   Neurological: She is alert and oriented to person, place, and time. Gait normal.   Skin: Skin is warm and dry. She is not diaphoretic.   Psychiatric: Mood, memory, " affect and judgment normal.     Diabetic foot exam:   Left: Filament test present              [place] test not examined   Pulses wnl   Reflexes present   Vibratory sensation diminished   Proprioception normal   Sharp/dull discrimination diminished       Right: Filament test present              [place] test not examined   Pulses present   Reflexes wnl   Vibratory sensation diminished   Proprioception normal   Sharp/dull discrimination diminished     Feet - warm, good capillary refill       Lab Review  Glucose (mg/dL)   Date Value   04/18/2017 133 (H)   04/17/2017 188 (H)   04/16/2017 265 (H)     Glucose, Arterial (mmol/L)   Date Value   04/16/2017 230     Sodium (mmol/L)   Date Value   04/18/2017 140   04/17/2017 137   04/16/2017 135 (L)     Potassium (mmol/L)   Date Value   04/18/2017 4.4   04/17/2017 4.3   04/16/2017 3.8     Chloride (mmol/L)   Date Value   04/18/2017 108   04/17/2017 106   04/16/2017 98     CO2 (mmol/L)   Date Value   04/18/2017 24.0   04/17/2017 20.0 (L)   04/16/2017 25.0     BUN (mg/dL)   Date Value   04/18/2017 16   04/17/2017 26 (H)   04/16/2017 40 (H)     Creatinine (mg/dL)   Date Value   04/18/2017 0.86   04/17/2017 1.07 (H)   04/16/2017 1.67 (H)     Hemoglobin A1C   Date Value   04/16/2017 12.86 % (H)   12/12/2016 11.8 %TotHgb (H)   11/17/2016 10.9 %TotHgb (H)   08/09/2016 9.1 %TotHgb (H)     Triglycerides (mg/dl)   Date Value   11/09/2016 >525 (H)   08/10/2016 310 (H)   03/27/2015 295 (H)       none     Assessment:     Yamileth was seen today for diabetes.    Diagnoses and all orders for this visit:    Chronic obstructive pulmonary disease with acute lower respiratory infection  -     cefuroxime (CEFTIN) 500 MG tablet; Take 1 tablet by mouth 2 (Two) Times a Day for 10 days.  -     montelukast (SINGULAIR) 10 MG tablet; Take 1 tablet by mouth Every Night.    Uncontrolled type 2 diabetes mellitus with complication, with long-term current use of insulin  -     Hemoglobin A1c  -     metFORMIN  (GLUMETZA) 1000 MG (MOD) 24 hr tablet; Take 1 tablet by mouth 2 (Two) Times a Day With Meals.    Diabetes mellitus type 2 in obese  -     lisinopril (PRINIVIL,ZESTRIL) 10 MG tablet; Take 1 tablet by mouth Daily.    Other orders  -     cyclobenzaprine (FLEXERIL) 10 MG tablet; Take 1 tablet by mouth 2 (Two) Times a Day.      Plan:     1.  Rx changes: none  2.  Education: Reviewed ‘ABCs’ of diabetes management (respective goals in parentheses):  A1C (<7), preprandial glucose (70 mg/dl - 130 mg/dl), postprandial glucose (< 180 mg/dl), blood pressure (<130/80), and cholesterol (LDL <100 mg/dl; HDL > 50 mg/dl; Trig < 150 mg/dl).  3.  Issues reviewed with Yamileth Slater: low cholesterol diet, weight control and daily exercise discussed, home glucose monitoring emphasized, foot care discussed and Podiatry visits discussed, annual eye examinations at Ophthalmology discussed, long term diabetic complications discussed, patient urged in the strongest terms to quit smoking and follow ADA diet.  4.  Compliance at present is estimated to be fair. Efforts to improve compliance (if necessary) will be directed at dietary modifications: ADA diet, increased exercise and regular blood sugar monitoring: 3 times daily.    Return in about 1 month (around 8/10/2017) for Recheck DM2.    GOALS:  Control of Blood Glucose    BARRIERS TO GOALS:  Compliance    Preventative:  Female Preventative:  Colon cancer screening is up to date   Recommended:Influenza - pt declined  Smoking cessation counseling was provided.  does not drink  eat more fruits and vegetables, decrease soda or juice intake, increase water intake, increase physical activity, reduce portion size, cut out extra servings, reduce fast food intake and have 3 meals a day    RISK SCORE: 4     Rosana Delarosa MD PGY2  [place] [address]  Office: [phone]    This document has been  electronically signed by Yadira Delarosa MD on July 10, 2017 10:34 AM

## 2017-08-10 ENCOUNTER — TELEPHONE (OUTPATIENT)
Dept: FAMILY MEDICINE CLINIC | Facility: CLINIC | Age: 57
End: 2017-08-10

## 2017-08-10 NOTE — TELEPHONE ENCOUNTER
[facility] IS CALLING REGARDING A PRESCRIPTION FOR A BACK BRACE. I LEFT THE FAX IN YOUR BOX. cOULD YOU PLEASE DO THIS ASAP. THANKS.

## 2017-08-14 ENCOUNTER — TELEPHONE (OUTPATIENT)
Dept: FAMILY MEDICINE CLINIC | Facility: CLINIC | Age: 57
End: 2017-08-14

## 2017-08-14 NOTE — TELEPHONE ENCOUNTER
Spoke to Cristiano at [facility]. Informed him that Dr. Delarosa will not authorizing the back brace because she has never discussed back pain with the PT.    [facility] is requesting that the paperwork be faxed back stating that it is being denied for their records. Had them refax and will place in Dr. Delarosa's box when it is received.

## 2017-08-25 ENCOUNTER — HOSPITAL ENCOUNTER (EMERGENCY)
Facility: HOSPITAL | Age: 57
Discharge: HOME | End: 2017-08-26
Attending: EMERGENCY MEDICINE | Admitting: EMERGENCY MEDICINE

## 2017-08-25 DIAGNOSIS — I10 ESSENTIAL HYPERTENSION: ICD-10-CM

## 2017-08-25 DIAGNOSIS — J42 CHRONIC BRONCHITIS, UNSPECIFIED CHRONIC BRONCHITIS TYPE (HCC): Primary | ICD-10-CM

## 2017-08-25 DIAGNOSIS — J44.9 CHRONIC OBSTRUCTIVE PULMONARY DISEASE WITH HYPOXIA (HCC): ICD-10-CM

## 2017-08-25 DIAGNOSIS — D72.829 LEUKOCYTOSIS, UNSPECIFIED TYPE: ICD-10-CM

## 2017-08-25 DIAGNOSIS — R79.9 ELEVATED BUN: ICD-10-CM

## 2017-08-25 DIAGNOSIS — D64.9 ANEMIA, UNSPECIFIED TYPE: ICD-10-CM

## 2017-08-25 DIAGNOSIS — N39.0 URINARY TRACT INFECTION, SITE UNSPECIFIED: ICD-10-CM

## 2017-08-25 DIAGNOSIS — R73.9 HYPERGLYCEMIA: ICD-10-CM

## 2017-08-25 DIAGNOSIS — E66.01 MORBID OBESITY WITH BMI OF 50.0-59.9, ADULT (HCC): ICD-10-CM

## 2017-08-25 DIAGNOSIS — R09.02 CHRONIC OBSTRUCTIVE PULMONARY DISEASE WITH HYPOXIA (HCC): ICD-10-CM

## 2017-08-25 DIAGNOSIS — F17.210 CIGARETTE SMOKER: ICD-10-CM

## 2017-08-25 DIAGNOSIS — F17.200 TOBACCO DEPENDENCE SYNDROME: ICD-10-CM

## 2017-08-25 PROCEDURE — 99284 EMERGENCY DEPT VISIT MOD MDM: CPT

## 2017-08-26 ENCOUNTER — APPOINTMENT (OUTPATIENT)
Dept: GENERAL RADIOLOGY | Facility: HOSPITAL | Age: 57
End: 2017-08-26

## 2017-08-26 VITALS
OXYGEN SATURATION: 96 % | RESPIRATION RATE: 18 BRPM | HEART RATE: 94 BPM | WEIGHT: 272 LBS | HEIGHT: 62 IN | DIASTOLIC BLOOD PRESSURE: 70 MMHG | BODY MASS INDEX: 50.05 KG/M2 | TEMPERATURE: 98 F | SYSTOLIC BLOOD PRESSURE: 156 MMHG

## 2017-08-26 LAB
ALBUMIN SERPL-MCNC: 3.7 G/DL (ref 3.4–4.8)
ALBUMIN/GLOB SERPL: 1.1 G/DL (ref 1.1–1.8)
ALP SERPL-CCNC: 141 U/L (ref 38–126)
ALT SERPL W P-5'-P-CCNC: 32 U/L (ref 9–52)
ANION GAP SERPL CALCULATED.3IONS-SCNC: 11 MMOL/L (ref 5–15)
ARTERIAL PATENCY WRIST A: ABNORMAL
AST SERPL-CCNC: 47 U/L (ref 14–36)
ATMOSPHERIC PRESS: ABNORMAL MMHG
BACTERIA UR QL AUTO: ABNORMAL /HPF
BASE EXCESS BLDA CALC-SCNC: 1.4 MMOL/L (ref -2.4–2.4)
BASOPHILS # BLD AUTO: 0.1 10*3/MM3 (ref 0–0.2)
BASOPHILS NFR BLD AUTO: 0.7 % (ref 0–2)
BDY SITE: ABNORMAL
BILIRUB SERPL-MCNC: 0.3 MG/DL (ref 0.2–1.3)
BILIRUB UR QL STRIP: NEGATIVE
BUN BLD-MCNC: 45 MG/DL (ref 7–21)
BUN/CREAT SERPL: 44.6 (ref 7–25)
CA-I BLD-MCNC: 4.8 MG/DL (ref 4.5–4.9)
CALCIUM SPEC-SCNC: 9.5 MG/DL (ref 8.4–10.2)
CHLORIDE SERPL-SCNC: 102 MMOL/L (ref 95–110)
CK MB SERPL-CCNC: 0.93 NG/ML (ref 0–5)
CK SERPL-CCNC: 49 U/L (ref 30–135)
CLARITY UR: ABNORMAL
CO2 BLDA-SCNC: 28.1 MMOL/L (ref 23–27)
CO2 SERPL-SCNC: 24 MMOL/L (ref 22–31)
COLOR UR: YELLOW
CREAT BLD-MCNC: 1.01 MG/DL (ref 0.5–1)
D-DIMER, QUANTITATIVE (MAD,POW, STR): 367 NG/ML (FEU) (ref 0–470)
DEPRECATED RDW RBC AUTO: 47.4 FL (ref 36.4–46.3)
EOSINOPHIL # BLD AUTO: 0.7 10*3/MM3 (ref 0–0.7)
EOSINOPHIL NFR BLD AUTO: 5.1 % (ref 0–7)
ERYTHROCYTE [DISTWIDTH] IN BLOOD BY AUTOMATED COUNT: 17.4 % (ref 11.5–14.5)
GFR SERPL CREATININE-BSD FRML MDRD: 56 ML/MIN/1.73 (ref 60–120)
GLOBULIN UR ELPH-MCNC: 3.5 GM/DL (ref 2.3–3.5)
GLUCOSE BLD-MCNC: 262 MG/DL (ref 60–100)
GLUCOSE BLDA-MCNC: 276 MMOL/L
GLUCOSE UR STRIP-MCNC: NEGATIVE MG/DL
HCO3 BLDA-SCNC: 26.7 MMOL/L (ref 22–26)
HCT VFR BLD AUTO: 32.8 % (ref 35–45)
HCT VFR BLD CALC: 33 % (ref 38–47)
HGB BLD-MCNC: 10.4 G/DL (ref 12–15.5)
HGB BLDA-MCNC: 11.1 G/DL (ref 12–16)
HGB UR QL STRIP.AUTO: NEGATIVE
HOLD SPECIMEN: NORMAL
HOLD SPECIMEN: NORMAL
HYALINE CASTS UR QL AUTO: ABNORMAL /LPF
IMM GRANULOCYTES # BLD: 0.09 10*3/MM3 (ref 0–0.02)
IMM GRANULOCYTES NFR BLD: 0.7 % (ref 0–0.5)
INR PPP: 0.93 (ref 0.8–1.2)
KETONES UR QL STRIP: NEGATIVE
LEUKOCYTE ESTERASE UR QL STRIP.AUTO: ABNORMAL
LYMPHOCYTES # BLD AUTO: 4.17 10*3/MM3 (ref 0.6–4.2)
LYMPHOCYTES NFR BLD AUTO: 30.2 % (ref 10–50)
MCH RBC QN AUTO: 23.6 PG (ref 26.5–34)
MCHC RBC AUTO-ENTMCNC: 31.7 G/DL (ref 31.4–36)
MCV RBC AUTO: 74.5 FL (ref 80–98)
MODALITY: ABNORMAL
MONOCYTES # BLD AUTO: 0.67 10*3/MM3 (ref 0–0.9)
MONOCYTES NFR BLD AUTO: 4.9 % (ref 0–12)
NEUTROPHILS # BLD AUTO: 8.07 10*3/MM3 (ref 2–8.6)
NEUTROPHILS NFR BLD AUTO: 58.4 % (ref 37–80)
NITRITE UR QL STRIP: NEGATIVE
NRBC BLD MANUAL-RTO: 0 /100 WBC (ref 0–0)
NT-PROBNP SERPL-MCNC: 116 PG/ML (ref 0–900)
PCO2 BLDA: 45.2 MM HG (ref 35–45)
PH BLDA: 7.39 PH UNITS (ref 7.35–7.45)
PH UR STRIP.AUTO: <=5 [PH] (ref 5–9)
PLATELET # BLD AUTO: 366 10*3/MM3 (ref 150–450)
PMV BLD AUTO: 8.9 FL (ref 8–12)
PO2 BLDA: 66.4 MM HG (ref 80–105)
POTASSIUM BLD-SCNC: 4.2 MMOL/L (ref 3.5–5.1)
POTASSIUM BLDA-SCNC: 4.05 MMOL/L (ref 3.6–4.9)
PROT SERPL-MCNC: 7.2 G/DL (ref 6.3–8.6)
PROT UR QL STRIP: NEGATIVE
PROTHROMBIN TIME: 12.4 SECONDS (ref 11.1–15.3)
RBC # BLD AUTO: 4.4 10*6/MM3 (ref 3.77–5.16)
RBC # UR: ABNORMAL /HPF
REF LAB TEST METHOD: ABNORMAL
SAO2 % BLDCOA: 92.8 %
SODIUM BLD-SCNC: 137 MMOL/L (ref 137–145)
SODIUM BLDA-SCNC: 138.1 MMOL/L (ref 138–146)
SP GR UR STRIP: 1.01 (ref 1–1.03)
SQUAMOUS #/AREA URNS HPF: ABNORMAL /HPF
TROPONIN I SERPL-MCNC: <0.012 NG/ML
TROPONIN I SERPL-MCNC: <0.012 NG/ML
UROBILINOGEN UR QL STRIP: ABNORMAL
WBC NRBC COR # BLD: 13.8 10*3/MM3 (ref 3.2–9.8)
WBC UR QL AUTO: ABNORMAL /HPF
WHOLE BLOOD HOLD SPECIMEN: NORMAL
WHOLE BLOOD HOLD SPECIMEN: NORMAL

## 2017-08-26 PROCEDURE — 93010 ELECTROCARDIOGRAM REPORT: CPT | Performed by: INTERNAL MEDICINE

## 2017-08-26 PROCEDURE — 85379 FIBRIN DEGRADATION QUANT: CPT | Performed by: EMERGENCY MEDICINE

## 2017-08-26 PROCEDURE — 85025 COMPLETE CBC W/AUTO DIFF WBC: CPT | Performed by: EMERGENCY MEDICINE

## 2017-08-26 PROCEDURE — 71010 HC CHEST PA OR AP: CPT

## 2017-08-26 PROCEDURE — 87086 URINE CULTURE/COLONY COUNT: CPT | Performed by: EMERGENCY MEDICINE

## 2017-08-26 PROCEDURE — 85610 PROTHROMBIN TIME: CPT | Performed by: EMERGENCY MEDICINE

## 2017-08-26 PROCEDURE — 80053 COMPREHEN METABOLIC PANEL: CPT | Performed by: EMERGENCY MEDICINE

## 2017-08-26 PROCEDURE — 83880 ASSAY OF NATRIURETIC PEPTIDE: CPT | Performed by: EMERGENCY MEDICINE

## 2017-08-26 PROCEDURE — 82803 BLOOD GASES ANY COMBINATION: CPT | Performed by: EMERGENCY MEDICINE

## 2017-08-26 PROCEDURE — 82550 ASSAY OF CK (CPK): CPT | Performed by: EMERGENCY MEDICINE

## 2017-08-26 PROCEDURE — 93005 ELECTROCARDIOGRAM TRACING: CPT | Performed by: EMERGENCY MEDICINE

## 2017-08-26 PROCEDURE — 81001 URINALYSIS AUTO W/SCOPE: CPT | Performed by: EMERGENCY MEDICINE

## 2017-08-26 PROCEDURE — 84484 ASSAY OF TROPONIN QUANT: CPT | Performed by: EMERGENCY MEDICINE

## 2017-08-26 PROCEDURE — 82553 CREATINE MB FRACTION: CPT | Performed by: EMERGENCY MEDICINE

## 2017-08-26 RX ORDER — SULFAMETHOXAZOLE AND TRIMETHOPRIM 800; 160 MG/1; MG/1
1 TABLET ORAL 2 TIMES DAILY
Qty: 20 TABLET | Refills: 0 | Status: SHIPPED | OUTPATIENT
Start: 2017-08-26 | End: 2017-09-07

## 2017-08-26 RX ORDER — ALBUTEROL SULFATE 0.63 MG/3ML
1 SOLUTION RESPIRATORY (INHALATION) EVERY 6 HOURS PRN
Qty: 20 VIAL | Refills: 0 | Status: SHIPPED | OUTPATIENT
Start: 2017-08-26 | End: 2018-09-04 | Stop reason: SDUPTHER

## 2017-08-26 RX ORDER — SODIUM CHLORIDE 0.9 % (FLUSH) 0.9 %
10 SYRINGE (ML) INJECTION AS NEEDED
Status: DISCONTINUED | OUTPATIENT
Start: 2017-08-26 | End: 2017-08-26 | Stop reason: HOSPADM

## 2017-08-26 RX ORDER — SULFAMETHOXAZOLE AND TRIMETHOPRIM 800; 160 MG/1; MG/1
1 TABLET ORAL ONCE
Status: COMPLETED | OUTPATIENT
Start: 2017-08-26 | End: 2017-08-26

## 2017-08-26 RX ORDER — ALBUTEROL SULFATE 90 UG/1
2 AEROSOL, METERED RESPIRATORY (INHALATION) EVERY 4 HOURS PRN
Qty: 1 INHALER | Refills: 0 | Status: SHIPPED | OUTPATIENT
Start: 2017-08-26 | End: 2018-04-05 | Stop reason: SDUPTHER

## 2017-08-26 RX ADMIN — SULFAMETHOXAZOLE AND TRIMETHOPRIM 160 MG: 800; 160 TABLET ORAL at 04:45

## 2017-08-26 NOTE — ED PROVIDER NOTES
Subjective   HPI Comments: Patient came complaining of cough and congestion for the last 3 weeks feeling short of breath she has used respiratory nebulizers at home    Patient is a 57 y.o. female presenting with shortness of breath.   History provided by:  Patient  Shortness of Breath   Severity:  Mild  Onset quality:  Gradual  Timing:  Intermittent  Progression:  Waxing and waning  Chronicity:  Recurrent  Context: activity and smoke exposure    Context: not animal exposure, not emotional upset, not occupational exposure and not pollens    Relieved by:  Nothing  Worsened by:  Exertion  Ineffective treatments:  None tried  Associated symptoms: no abdominal pain, no chest pain, no cough, no diaphoresis, no fever, no headaches, no hemoptysis, no neck pain, no rash, no sore throat, no syncope and no wheezing    Risk factors: obesity and tobacco use    Risk factors: no recent alcohol use        Review of Systems   Constitutional: Negative for activity change, appetite change, diaphoresis, fatigue and fever.   HENT: Negative for congestion, facial swelling, mouth sores, nosebleeds, sore throat and trouble swallowing.    Eyes: Negative for discharge, redness and itching.   Respiratory: Positive for shortness of breath. Negative for apnea, cough, hemoptysis and wheezing.    Cardiovascular: Negative for chest pain, palpitations and syncope.   Gastrointestinal: Negative for abdominal pain, blood in stool and nausea.   Endocrine: Negative for cold intolerance, heat intolerance, polydipsia, polyphagia and polyuria.   Genitourinary: Negative for difficulty urinating, dysuria, flank pain, frequency and hematuria.   Musculoskeletal: Negative for gait problem, joint swelling and neck pain.   Skin: Negative.  Negative for color change, pallor and rash.   Allergic/Immunologic: Negative for environmental allergies.   Neurological: Negative for dizziness, seizures, syncope, speech difficulty, light-headedness, numbness and headaches.    Hematological: Negative for adenopathy.   Psychiatric/Behavioral: Negative for agitation, behavioral problems, confusion and sleep disturbance. The patient is not nervous/anxious.        Past Medical History:   Diagnosis Date   • Anxiety states    • Asthma    • Carotid artery stenosis     DWIGHT 0-15%, LICA 0-15%. LICA stent 5/2014.   • Chronic folliculitis    • Chronic obstructive lung disease    • Coronary arteriosclerosis    • Diabetes mellitus     no retinopathy; A1C 9.1      • Dyslipidemia    • Essential hypertension    • Excoriated eczema     asteosis/keratosis   • GERD (gastroesophageal reflux disease)    • History of colon polyps    • Hypertensive disorder    • Kidney stone    • Mixed hyperlipidemia    • Morbid obesity due to excess calories     BMI 44.5   • Neurologic disorder associated with diabetes mellitus    • Non-healing surgical wound     LLE EVHa   • Peripheral vascular disease    • Sleep apnea    • Tobacco dependence syndrome     1/2ppd      • Type 2 diabetes mellitus    • Vitamin D deficiency        Allergies   Allergen Reactions   • Other      Bandaids, MRSA, SURECLOSE       Past Surgical History:   Procedure Laterality Date   • CARDIAC CATHETERIZATION  08/09/2013    Severe multivessel CAD with critical lesions noted in the RCA, obtuse marginal two, ramus intermemdious, and LAD as described above. Normal LV systolic function with no wall motion abnormality.    • CARDIAC CATHETERIZATION  05/27/2014     LEFT Carotid Stent    • CAROTID STENT Left 05/27/2014   • COLONOSCOPY  05/02/2016    Normal colon.Diverticulosis in the sigmoid colon.No specimens collected.    • CORONARY ARTERY BYPASS GRAFT  11/15/2013    CABG x 3 with LIMA to LAD and coronary endarterectomy to LAD, SVG to Ramus intermedius branch and SVG to PDA.    • ENDOSCOPY  09/23/2015     Esophageal stricture present.Normal stomach.Normal duodenum.    • ENDOSCOPY  11/16/2015    w/ dilatation - Esophageal stricture present,Dilatation  performed.Normal stomach and duodenum.    • INCISION AND DRAINAGE ABSCESS  01/02/2016    Incision and drainage of right perineal abscess.    • INCISION AND DRAINAGE ABSCESS  02/18/2014    Incision and Drainage of abscess of left lower leg    • OTHER SURGICAL HISTORY  01/25/2016    DESTRUCTION OF BENIGN LESION      • OTHER SURGICAL HISTORY  04/18/2014    ear/neck - L attempted CEA, Neck Dissection        Family History   Problem Relation Age of Onset   • Coronary artery disease Other    • Diabetes Other    • Heart disease Other    • Hypertension Other        Social History     Social History   • Marital status:      Spouse name: N/A   • Number of children: N/A   • Years of education: N/A     Social History Main Topics   • Smoking status: Current Every Day Smoker     Packs/day: 0.25     Types: Cigarettes   • Smokeless tobacco: Never Used   • Alcohol use No   • Drug use: No   • Sexual activity: Not on file     Other Topics Concern   • Not on file     Social History Narrative           Objective   Physical Exam   Constitutional: She is oriented to person, place, and time. She appears well-developed and well-nourished.   HENT:   Head: Normocephalic and atraumatic.   Nose: Nose normal.   Mouth/Throat: Oropharynx is clear and moist.   Eyes: Conjunctivae and EOM are normal. Pupils are equal, round, and reactive to light.   Neck: Normal range of motion. Neck supple.   Cardiovascular: Normal rate, regular rhythm, normal heart sounds and intact distal pulses.    Pulmonary/Chest: Effort normal and breath sounds normal.   Abdominal: Soft. Bowel sounds are normal.   Musculoskeletal: Normal range of motion.   Neurological: She is alert and oriented to person, place, and time.   Skin: Skin is warm and dry.   Psychiatric: She has a normal mood and affect. Her behavior is normal. Judgment and thought content normal.   Nursing note and vitals reviewed.      ECG 12 Lead    Date/Time: 8/26/2017 3:22 AM  Performed by: CÉSAR  LEONILA  Authorized by: LEONILA LINDSEY   Interpreted by physician  Comparison: not compared with previous ECG   Rhythm: sinus rhythm  Rate: normal  QRS axis: normal  T depression: I, aVR and aVL  Other findings: prolonged QTc interval  Clinical impression: abnormal ECG               ED Course  ED Course        Labs Reviewed   URINALYSIS W/ CULTURE IF INDICATED - Abnormal; Notable for the following:        Result Value    Appearance, UA Cloudy (*)     Leuk Esterase, UA Moderate (2+) (*)     All other components within normal limits   COMPREHENSIVE METABOLIC PANEL - Abnormal; Notable for the following:     Glucose 262 (*)     BUN 45 (*)     Creatinine 1.01 (*)     AST (SGOT) 47 (*)     Alkaline Phosphatase 141 (*)     eGFR Non African Amer 56 (*)     BUN/Creatinine Ratio 44.6 (*)     All other components within normal limits   CBC WITH AUTO DIFFERENTIAL - Abnormal; Notable for the following:     WBC 13.80 (*)     Hemoglobin 10.4 (*)     Hematocrit 32.8 (*)     MCV 74.5 (*)     MCH 23.6 (*)     RDW 17.4 (*)     RDW-SD 47.4 (*)     Immature Grans % 0.7 (*)     Immature Grans, Absolute 0.09 (*)     All other components within normal limits   BLOOD GAS, ARTERIAL - Abnormal; Notable for the following:     pCO2, Arterial 45.2 (*)     pO2, Arterial 66.4 (*)     HCO3, Arterial 26.7 (*)     Hemoglobin, Blood Gas 11.1 (*)     Hematocrit, Blood Gas 33.0 (*)     CO2 Content 28.1 (*)     All other components within normal limits   URINALYSIS, MICROSCOPIC ONLY - Abnormal; Notable for the following:     RBC, UA 3-5 (*)     WBC, UA 6-12 (*)     Bacteria, UA 1+ (*)     Squamous Epithelial Cells, UA 13-20 (*)     All other components within normal limits   TROPONIN (IN-HOUSE) - Normal   TROPONIN (IN-HOUSE) - Normal   CK - Normal   CK MB - Normal   PROTIME-INR - Normal    Narrative:     Therapeutic range for most indications is 2.0-3.0 INR,  or 2.5-3.5 for mechanical heart valves.   BNP (IN-HOUSE) - Normal   D-DIMER, QUANTITATIVE - Normal     Narrative:     Dimer values <500 ng/ml FEU are FDA approved as aid in diagnosis of deep venous thrombosis and pulmonary embolism.  This test should not be used in an exclusion strategy with pretest probability alone.    A recent guideline regarding diagnosis for pulmonary thomboembolism recommends an adjusted exclusion criterion of age x 10 ng/ml FEU for patients >50 years of age ([employer] Intern Med 2015; 163: 701-711).   URINE CULTURE   RAINBOW DRAW    Narrative:     The following orders were created for panel order Ashley Draw.  Procedure                               Abnormality         Status                     ---------                               -----------         ------                     Light Blue Top[[id number]]                                   Final result               Green Top (Gel)[[id number]]                                  Final result               Lavender Top[[id number]]                                     Final result               Gold Top - SST[[id number]]                                   Final result                 Please view results for these tests on the individual orders.   TROPONIN (IN-HOUSE)   CK   CK   CK MB   CK MB   CBC AND DIFFERENTIAL    Narrative:     The following orders were created for panel order CBC & Differential.  Procedure                               Abnormality         Status                     ---------                               -----------         ------                     Scan Slide[[id number]]                                                                  CBC Auto Differential[[id number]]        Abnormal            Final result                 Please view results for these tests on the individual orders.   LIGHT BLUE TOP   GREEN TOP   LAVENDER TOP   GOLD TOP - SST        XR Chest 1 View   Final Result   Cardiomegaly. No obvious acute abnormality      Electronically signed by:  Kaleb Cruz MD  8/26/2017 1:45 AM   CDT Workstation: [id number]                   MDM    Final diagnoses:   Chronic bronchitis, unspecified chronic bronchitis type   Urinary tract infection, site unspecified   Leukocytosis, unspecified type   Anemia, unspecified type   Hyperglycemia   Elevated BUN            Angel Hernandez MD  08/26/17 [id number]

## 2017-08-27 LAB — BACTERIA SPEC AEROBE CULT: NORMAL

## 2017-08-28 ENCOUNTER — TELEPHONE (OUTPATIENT)
Dept: FAMILY MEDICINE CLINIC | Facility: CLINIC | Age: 57
End: 2017-08-28

## 2017-08-31 ENCOUNTER — TELEPHONE (OUTPATIENT)
Dept: FAMILY MEDICINE CLINIC | Facility: CLINIC | Age: 57
End: 2017-08-31

## 2017-09-07 ENCOUNTER — OFFICE VISIT (OUTPATIENT)
Dept: FAMILY MEDICINE CLINIC | Facility: CLINIC | Age: 57
End: 2017-09-07

## 2017-09-07 ENCOUNTER — TELEPHONE (OUTPATIENT)
Dept: FAMILY MEDICINE CLINIC | Facility: CLINIC | Age: 57
End: 2017-09-07

## 2017-09-07 VITALS
BODY MASS INDEX: 50.05 KG/M2 | DIASTOLIC BLOOD PRESSURE: 80 MMHG | SYSTOLIC BLOOD PRESSURE: 130 MMHG | HEIGHT: 62 IN | OXYGEN SATURATION: 95 % | WEIGHT: 272 LBS | HEART RATE: 102 BPM

## 2017-09-07 DIAGNOSIS — E66.9 DIABETES MELLITUS TYPE 2 IN OBESE (HCC): Primary | ICD-10-CM

## 2017-09-07 DIAGNOSIS — J42 CHRONIC BRONCHITIS, UNSPECIFIED CHRONIC BRONCHITIS TYPE (HCC): ICD-10-CM

## 2017-09-07 DIAGNOSIS — E11.69 DIABETES MELLITUS TYPE 2 IN OBESE (HCC): Primary | ICD-10-CM

## 2017-09-07 PROCEDURE — 99213 OFFICE O/P EST LOW 20 MIN: CPT | Performed by: FAMILY MEDICINE

## 2017-09-07 RX ORDER — INSULIN GLARGINE 100 [IU]/ML
95 INJECTION, SOLUTION SUBCUTANEOUS EVERY 12 HOURS
Qty: 1 EACH | Refills: 11 | Status: SHIPPED | OUTPATIENT
Start: 2017-09-07 | End: 2018-04-05 | Stop reason: SDUPTHER

## 2017-09-07 RX ORDER — PANTOPRAZOLE SODIUM 40 MG/1
40 TABLET, DELAYED RELEASE ORAL DAILY
Qty: 30 TABLET | Refills: 3 | Status: SHIPPED | OUTPATIENT
Start: 2017-09-07 | End: 2018-04-05 | Stop reason: SDUPTHER

## 2017-09-07 RX ORDER — GABAPENTIN 800 MG/1
800 TABLET ORAL 3 TIMES DAILY
Qty: 90 TABLET | Refills: 3 | Status: SHIPPED | OUTPATIENT
Start: 2017-09-07 | End: 2018-01-04 | Stop reason: SDUPTHER

## 2017-09-07 RX ORDER — METOPROLOL TARTRATE 50 MG/1
50 TABLET, FILM COATED ORAL 2 TIMES DAILY
Qty: 60 TABLET | Refills: 3 | Status: SHIPPED | OUTPATIENT
Start: 2017-09-07 | End: 2018-04-05 | Stop reason: SDUPTHER

## 2017-09-07 RX ORDER — NYSTATIN 100000 [USP'U]/G
POWDER TOPICAL 2 TIMES DAILY
Qty: 15 G | Refills: 1 | Status: SHIPPED | OUTPATIENT
Start: 2017-09-07 | End: 2018-02-02 | Stop reason: SDUPTHER

## 2017-09-07 RX ORDER — FUROSEMIDE 40 MG/1
40 TABLET ORAL DAILY
Qty: 30 TABLET | Refills: 3 | Status: SHIPPED | OUTPATIENT
Start: 2017-09-07 | End: 2017-11-29

## 2017-09-07 RX ORDER — CLOBETASOL PROPIONATE 0.5 MG/ML
LOTION TOPICAL
Qty: 118 G | Refills: 3 | Status: SHIPPED | OUTPATIENT
Start: 2017-09-07 | End: 2018-02-02 | Stop reason: SDUPTHER

## 2017-09-07 NOTE — TELEPHONE ENCOUNTER
[facility] SAID THEY SENT FAX CONCERNING BACK,ANKLE AND KNEE BRACE FOR PT. WANTING TO KNOW IF YOU RECEIVED THIS. PHONE:[phone]

## 2017-09-07 NOTE — PROGRESS NOTES
Subjective:     Yamileth Slater is a 57 y.o. female who presents for evaluation of acute illness.    Chief Complaint   Patient presents with   • Cough   • Urinary Tract Infection       HPI Comments: Patient presents for ER follow up of Cystitis and Bronchitis on 8/25/17.  Reports that she was sent home on Bactrim for her UTI and is finished with the medication.  Review of lab work indicates urine was contaminated with >20 epithelial cells, urine culture was negative.  Patient states her breathing is better, she has a slight cough but is overall improved. Denies fevers or chills.       The following portions of the patient's history were reviewed and updated as appropriate: allergies, current medications, past family history, past medical history, past social history, past surgical history and problem list.    Past Medical History:   Diagnosis Date   • Anxiety states    • Asthma    • Carotid artery stenosis     DWIGHT 0-15%, LICA 0-15%. LICA stent 5/2014.   • Chronic folliculitis    • Chronic obstructive lung disease    • Coronary arteriosclerosis    • Diabetes mellitus     no retinopathy; A1C 9.1      • Dyslipidemia    • Essential hypertension    • Excoriated eczema     asteosis/keratosis   • GERD (gastroesophageal reflux disease)    • History of colon polyps    • Hypertensive disorder    • Kidney stone    • Mixed hyperlipidemia    • Morbid obesity due to excess calories     BMI 44.5   • Neurologic disorder associated with diabetes mellitus    • Non-healing surgical wound     LLE EVHa   • Peripheral vascular disease    • Sleep apnea    • Tobacco dependence syndrome     1/2ppd      • Type 2 diabetes mellitus    • Vitamin D deficiency        Past Surgical History:   Procedure Laterality Date   • CARDIAC CATHETERIZATION  08/09/2013    Severe multivessel CAD with critical lesions noted in the RCA, obtuse marginal two, ramus intermemdious, and LAD as described above. Normal LV systolic function with no wall motion  abnormality.    • CARDIAC CATHETERIZATION  05/27/2014     LEFT Carotid Stent    • CAROTID STENT Left 05/27/2014   • COLONOSCOPY  05/02/2016    Normal colon.Diverticulosis in the sigmoid colon.No specimens collected.    • CORONARY ARTERY BYPASS GRAFT  11/15/2013    CABG x 3 with LIMA to LAD and coronary endarterectomy to LAD, SVG to Ramus intermedius branch and SVG to PDA.    • ENDOSCOPY  09/23/2015     Esophageal stricture present.Normal stomach.Normal duodenum.    • ENDOSCOPY  11/16/2015    w/ dilatation - Esophageal stricture present,Dilatation performed.Normal stomach and duodenum.    • INCISION AND DRAINAGE ABSCESS  01/02/2016    Incision and drainage of right perineal abscess.    • INCISION AND DRAINAGE ABSCESS  02/18/2014    Incision and Drainage of abscess of left lower leg    • OTHER SURGICAL HISTORY  01/25/2016    DESTRUCTION OF BENIGN LESION      • OTHER SURGICAL HISTORY  04/18/2014    ear/neck - L attempted CEA, Neck Dissection        Family History   Problem Relation Age of Onset   • Coronary artery disease Other    • Diabetes Other    • Heart disease Other    • Hypertension Other          Current Outpatient Prescriptions:   •  albuterol (ACCUNEB) 0.63 MG/3ML nebulizer solution, Take 3 mL by nebulization Every 6 (Six) Hours As Needed for Wheezing., Disp: 20 vial, Rfl: 0  •  albuterol (PROVENTIL HFA;VENTOLIN HFA) 108 (90 Base) MCG/ACT inhaler, Inhale 2 puffs Every 4 (Four) Hours As Needed for Wheezing., Disp: 1 inhaler, Rfl: 0  •  atorvastatin (LIPITOR) 20 MG tablet, Take 1 tablet by mouth Daily., Disp: 30 tablet, Rfl: 3  •  Blood Glucose Monitoring Suppl ([employer] BLOOD GLUCOSE METER) W/DEVICE kit, 1 each 3 (Three) Times a Day., Disp: 1 kit, Rfl: 3  •  cetirizine (zyrTEC) 10 MG tablet, Take 1 tablet by mouth Daily., Disp: 30 tablet, Rfl: 3  •  clobetasol (CLOBEX) 0.05 % lotion, apply to skin bid x 1-2w then prn. generic, Disp: 118 g, Rfl: 3  •  clopidogrel (PLAVIX) 75 MG tablet, Take 1 tablet by mouth Daily.,  Disp: 30 tablet, Rfl: 3  •  clopidogrel (PLAVIX) 75 MG tablet, TAKE 1 TABLET BY MOUTH DAILY., Disp: 30 tablet, Rfl: 3  •  cyclobenzaprine (FLEXERIL) 10 MG tablet, Take 1 tablet by mouth 2 (Two) Times a Day., Disp: 60 tablet, Rfl: 2  •  furosemide (LASIX) 40 MG tablet, Take 1 tablet by mouth Daily., Disp: 30 tablet, Rfl: 3  •  gabapentin (NEURONTIN) 800 MG tablet, Take 1 tablet by mouth 3 (Three) Times a Day., Disp: 90 tablet, Rfl: 3  •  gabapentin (NEURONTIN) 800 MG tablet, TAKE 1 TABLET BY MOUTH 3 (THREE) TIMES A DAY., Disp: 90 tablet, Rfl: 3  •  glucose blood test strip, 1 each by Other route 4 (Four) Times a Day. For blood glucose monitoring. Dx: E11.628, Disp: 100 each, Rfl: 12  •  insulin glargine (LANTUS) 100 UNIT/ML injection, Inject 95 Units under the skin Every 12 (Twelve) Hours., Disp: 1 each, Rfl: 11  •  Insulin Lispro (HUMALOG KWIKPEN) 100 UNIT/ML solution pen-injector, Inject 45 Units under the skin 3 (Three) Times a Day Before Meals., Disp: 60 mL, Rfl: 11  •  Insulin Pen Needle (NOVOFINE) 30G X 8 MM misc, As directed 4 times daily, Disp: 100 each, Rfl: 11  •  ipratropium-albuterol (DUO-NEB) 0.5-2.5 mg/mL nebulizer, Take 3 mL by nebulization 4 (Four) Times a Day. ICD10 code J96.01, Disp: 360 mL, Rfl: 0  •  lisinopril (PRINIVIL,ZESTRIL) 10 MG tablet, Take 1 tablet by mouth Daily., Disp: 30 tablet, Rfl: 3  •  metFORMIN (GLUMETZA) 1000 MG (MOD) 24 hr tablet, Take 1 tablet by mouth 2 (Two) Times a Day With Meals., Disp: 60 tablet, Rfl: 3  •  metoprolol tartrate (LOPRESSOR) 50 MG tablet, Take 1 tablet by mouth 2 (Two) Times a Day., Disp: 60 tablet, Rfl: 3  •  montelukast (SINGULAIR) 10 MG tablet, Take 1 tablet by mouth Every Night., Disp: 30 tablet, Rfl: 3  •  naproxen (NAPROSYN) 500 MG tablet, Take 1 tablet by mouth 2 (Two) Times a Day With Meals., Disp: 60 tablet, Rfl: 3  •  nitroglycerin (NITROSTAT) 0.4 MG SL tablet, Place 1 tablet under the tongue As Needed for Chest Pain. Take no more than 3 doses in 15  minutes., Disp: 30 tablet, Rfl: 3  •  pantoprazole (PROTONIX) 40 MG EC tablet, Take 1 tablet by mouth Daily., Disp: 30 tablet, Rfl: 3  •  promethazine (PHENERGAN) 25 MG tablet, Take 1 tablet by mouth Every 6 (Six) Hours As Needed for Nausea or Vomiting., Disp: 30 tablet, Rfl: 0  •  sulfamethoxazole-trimethoprim (BACTRIM DS,SEPTRA DS) 800-160 MG per tablet, Take 1 tablet by mouth 2 (Two) Times a Day., Disp: 20 tablet, Rfl: 0  •  SYMBICORT 160-4.5 MCG/ACT inhaler, INHALE 2 PUFFS INTO LUNGS 2 (TWO) TIMES A DAY-RINSE MOUTH AFTER USE, Disp: 10.2 g, Rfl: 6    Allergies   Allergen Reactions   • Other      Bandaids, MRSA, SURECLOSE       Social History     Social History   • Marital status:      Spouse name: N/A   • Number of children: N/A   • Years of education: N/A     Occupational History   • Not on file.     Social History Main Topics   • Smoking status: Current Every Day Smoker     Packs/day: 0.25     Types: Cigarettes   • Smokeless tobacco: Never Used   • Alcohol use No   • Drug use: No   • Sexual activity: Not on file     Other Topics Concern   • Not on file     Social History Narrative       Review of Systems   Constitutional: Negative for chills and fever.   HENT: Negative for congestion, sinus pressure and sore throat.    Eyes: Negative for photophobia and visual disturbance.   Respiratory: Positive for cough. Negative for chest tightness, shortness of breath and wheezing.    Cardiovascular: Negative for chest pain and palpitations.   Gastrointestinal: Negative for abdominal pain, constipation, diarrhea, nausea and vomiting.   Genitourinary: Negative for decreased urine volume, difficulty urinating, dysuria, flank pain, frequency and urgency.   Musculoskeletal: Negative for arthralgias and myalgias.   Skin: Negative for color change and rash.   Neurological: Negative for dizziness, weakness and light-headedness.   Psychiatric/Behavioral: Negative for confusion and decreased concentration.       Objective:  "    /80  Pulse 102  Ht 62\" (157.5 cm)  Wt 272 lb (123 kg)  LMP  (LMP Unknown)  SpO2 95%  BMI 49.75 kg/m2    Physical Exam   Constitutional: She is oriented to person, place, and time. She appears well-developed and well-nourished. No distress.   HENT:   Head: Normocephalic and atraumatic.   Eyes: EOM are normal.   Neck: Normal range of motion.   Cardiovascular: Normal rate, regular rhythm, normal heart sounds and intact distal pulses.  Exam reveals no gallop and no friction rub.    No murmur heard.  Pulmonary/Chest: Effort normal and breath sounds normal. No respiratory distress. She has no wheezes. She has no rales. She exhibits no tenderness.   Abdominal: Soft. Bowel sounds are normal. She exhibits no distension. There is no tenderness.   Musculoskeletal: Normal range of motion.   Neurological: She is alert and oriented to person, place, and time.   Skin: Skin is warm and dry. No rash noted.   Psychiatric: She has a normal mood and affect. Her behavior is normal.   Vitals reviewed.      Assessment/Plan:     Yamileth was seen today for cough and urinary tract infection.    Diagnoses and all orders for this visit:    Diabetes mellitus type 2 in obese  -     Hemoglobin A1c; Future  -     gabapentin (NEURONTIN) 800 MG tablet; Take 1 tablet by mouth 3 (Three) Times a Day.    Chronic bronchitis, unspecified chronic bronchitis type    Other orders  -     nystatin (MYCOSTATIN) 569775 UNIT/GM powder; Apply  topically 2 (Two) Times a Day.  -     clobetasol (CLOBEX) 0.05 % lotion; apply to skin bid x 1-2w then prn. generic  -     furosemide (LASIX) 40 MG tablet; Take 1 tablet by mouth Daily.  -     insulin glargine (LANTUS) 100 UNIT/ML injection; Inject 95 Units under the skin Every 12 (Twelve) Hours.  -     metoprolol tartrate (LOPRESSOR) 50 MG tablet; Take 1 tablet by mouth 2 (Two) Times a Day.  -     pantoprazole (PROTONIX) 40 MG EC tablet; Take 1 tablet by mouth Daily.        Return in about 6 weeks (around " 10/19/2017) for DM2.      Signature  Yadira Delarosa MD PGY3  [place] [address]  Office: [phone]    This document has been electronically signed by Yadira Delarosa MD on September 7, 2017 3:12 PM

## 2017-09-13 RX ORDER — LEVOFLOXACIN 750 MG/1
750 TABLET ORAL DAILY
Qty: 14 TABLET | Refills: 0 | Status: SHIPPED | OUTPATIENT
Start: 2017-09-13 | End: 2017-10-19

## 2017-09-14 RX ORDER — FLUCONAZOLE 150 MG/1
150 TABLET ORAL ONCE
Qty: 1 TABLET | Refills: 0 | Status: SHIPPED | OUTPATIENT
Start: 2017-09-14 | End: 2017-09-14

## 2017-10-05 ENCOUNTER — LAB (OUTPATIENT)
Dept: LAB | Facility: HOSPITAL | Age: 57
End: 2017-10-05

## 2017-10-05 DIAGNOSIS — IMO0002 UNCONTROLLED TYPE 2 DIABETES MELLITUS WITH COMPLICATION, WITH LONG-TERM CURRENT USE OF INSULIN: Primary | ICD-10-CM

## 2017-10-05 DIAGNOSIS — E66.9 DIABETES MELLITUS TYPE 2 IN OBESE (HCC): ICD-10-CM

## 2017-10-05 DIAGNOSIS — IMO0002 UNCONTROLLED TYPE 2 DIABETES MELLITUS WITH COMPLICATION, WITH LONG-TERM CURRENT USE OF INSULIN: ICD-10-CM

## 2017-10-05 DIAGNOSIS — E11.69 DIABETES MELLITUS TYPE 2 IN OBESE (HCC): ICD-10-CM

## 2017-10-05 LAB
ALBUMIN UR-MCNC: 39.2 MG/L
CREAT UR-MCNC: 45.4 MG/DL
HBA1C MFR BLD: 11.7 % (ref 4–5.6)
MICROALBUMIN/CREAT UR: 863.4 MG/G (ref 0–30)

## 2017-10-05 PROCEDURE — 36415 COLL VENOUS BLD VENIPUNCTURE: CPT

## 2017-10-05 PROCEDURE — 82570 ASSAY OF URINE CREATININE: CPT | Performed by: FAMILY MEDICINE

## 2017-10-05 PROCEDURE — 82043 UR ALBUMIN QUANTITATIVE: CPT | Performed by: FAMILY MEDICINE

## 2017-10-05 PROCEDURE — 83036 HEMOGLOBIN GLYCOSYLATED A1C: CPT | Performed by: FAMILY MEDICINE

## 2017-10-19 ENCOUNTER — TELEPHONE (OUTPATIENT)
Dept: FAMILY MEDICINE CLINIC | Facility: CLINIC | Age: 57
End: 2017-10-19

## 2017-10-19 ENCOUNTER — OFFICE VISIT (OUTPATIENT)
Dept: FAMILY MEDICINE CLINIC | Facility: CLINIC | Age: 57
End: 2017-10-19

## 2017-10-19 VITALS
OXYGEN SATURATION: 96 % | DIASTOLIC BLOOD PRESSURE: 87 MMHG | HEIGHT: 62 IN | HEART RATE: 83 BPM | BODY MASS INDEX: 51.6 KG/M2 | WEIGHT: 280.4 LBS | SYSTOLIC BLOOD PRESSURE: 139 MMHG

## 2017-10-19 DIAGNOSIS — IMO0002 UNCONTROLLED TYPE 2 DIABETES MELLITUS WITH COMPLICATION, WITH LONG-TERM CURRENT USE OF INSULIN: Primary | ICD-10-CM

## 2017-10-19 DIAGNOSIS — Z23 NEED FOR INFLUENZA VACCINATION: ICD-10-CM

## 2017-10-19 PROCEDURE — 99213 OFFICE O/P EST LOW 20 MIN: CPT | Performed by: FAMILY MEDICINE

## 2017-10-19 PROCEDURE — 90686 IIV4 VACC NO PRSV 0.5 ML IM: CPT | Performed by: FAMILY MEDICINE

## 2017-10-19 PROCEDURE — G0008 ADMIN INFLUENZA VIRUS VAC: HCPCS | Performed by: FAMILY MEDICINE

## 2017-10-19 NOTE — PROGRESS NOTES
"  Subjective:     Yamileth Slater is a 57 y.o. female who presents for refills of medications for uncontrolled DM2 with hyperglycemia.     Diabetes HPI:  The patient was initially diagnosed with Type 2 diabetes mellitus based on the following criteria:  Elevated A1C and random blood glucose.    Known diabetic complications: peripheral neuropathy and cardiovascular disease  Cardiovascular risk factors: diabetes mellitus, dyslipidemia, hypertension, obesity (BMI >= 30 kg/m2), sedentary lifestyle and smoking/ tobacco exposure  Current diabetic medications include Metformin 1000 mg BID; Lantus 95units qhs & 95units qAM; Humalog 45-60units sliding scale with meals.     Eye exam current (within one year): no - would like to be referred to Dr. New in [place].  Weight trend: increasing steadily  Prior visit with dietician: yes - in hospital  Current diet: in general, an \"unhealthy\" diet, but has been avoiding Burger Abdif[employer]h as recommended  Current exercise: none    Current monitoring regimen: home blood tests - 2-3 times daily  Home blood sugar records: checks irregularly, sometimes fasting, sometimes during the day.  Reports blood glucose is always >200    Any episodes of hypoglycemia? no     ACE inhibitor or angiotensin II receptor blocker?     Yes     lisinopril (generic)        Statin?     Yes    ASA?     Yes     Hemoglobin A1C (%)   Date Value   10/05/2017 11.7 (H)   07/10/2017 12.92 (H)   04/16/2017 12.86 (H)       ASSOCIATED SYMPTOMS:     FatigueNo   MalaiseNo    DiaphoresisNo    Weight lossNo    Weight gainYes    Visual impairmentNo   Chest painNo    PalpitationsNo    TachycardiaNo   ClaudicationNo    PolyphagiaYes    PolydipsiaYes    PolyuriaYes    NumbnessYes    Foot painNo    Skin lesionsNo    Memory lossNo     COMORBID CONDITIONS:     ObesityYes    HypothyroidismNo    HyperlipidemiaYes    CADYes    Cerebrovascular diseaseNo   PVDNo    HTNYes    Sexual dysfunctionNo    DepressionNo     The following " portions of the patient's history were reviewed and updated as appropriate: allergies, current medications, past family history, past medical history, past social history, past surgical history and problem list.    Preventative:  Over the past 2 weeks, have you felt down, depressed, or hopeless?No   Over the past 2 weeks, have you felt little interest or pleasure in doing things?No  Clinical depression screening refused by patient.No     On osteoporosis therapy?No     Past Medical Hx:  Past Medical History:   Diagnosis Date   • Anxiety states    • Asthma    • Carotid artery stenosis     DWIGHT 0-15%, LICA 0-15%. LICA stent 5/2014.   • Chronic folliculitis    • Chronic obstructive lung disease    • Coronary arteriosclerosis    • Diabetes mellitus     no retinopathy; A1C 9.1      • Dyslipidemia    • Essential hypertension    • Excoriated eczema     asteosis/keratosis   • GERD (gastroesophageal reflux disease)    • History of colon polyps    • Hypertensive disorder    • Kidney stone    • Mixed hyperlipidemia    • Morbid obesity due to excess calories     BMI 44.5   • Neurologic disorder associated with diabetes mellitus    • Non-healing surgical wound     LLE EVHa   • Peripheral vascular disease    • Sleep apnea    • Tobacco dependence syndrome     1/2ppd      • Type 2 diabetes mellitus    • Vitamin D deficiency        Past Surgical Hx:  Past Surgical History:   Procedure Laterality Date   • CARDIAC CATHETERIZATION  08/09/2013    Severe multivessel CAD with critical lesions noted in the RCA, obtuse marginal two, ramus intermemdious, and LAD as described above. Normal LV systolic function with no wall motion abnormality.    • CARDIAC CATHETERIZATION  05/27/2014     LEFT Carotid Stent    • CAROTID STENT Left 05/27/2014   • COLONOSCOPY  05/02/2016    Normal colon.Diverticulosis in the sigmoid colon.No specimens collected.    • CORONARY ARTERY BYPASS GRAFT  11/15/2013    CABG x 3 with LIMA to LAD and coronary endarterectomy  to LAD, SVG to Ramus intermedius branch and SVG to PDA.    • ENDOSCOPY  09/23/2015     Esophageal stricture present.Normal stomach.Normal duodenum.    • ENDOSCOPY  11/16/2015    w/ dilatation - Esophageal stricture present,Dilatation performed.Normal stomach and duodenum.    • INCISION AND DRAINAGE ABSCESS  01/02/2016    Incision and drainage of right perineal abscess.    • INCISION AND DRAINAGE ABSCESS  02/18/2014    Incision and Drainage of abscess of left lower leg    • OTHER SURGICAL HISTORY  01/25/2016    DESTRUCTION OF BENIGN LESION      • OTHER SURGICAL HISTORY  04/18/2014    ear/neck - L attempted CEA, Neck Dissection        Health Maintenance:  Immunization History   Administered Date(s) Administered   • Flu Vaccine High Dose PF 65YR+ 11/10/2014   • Hepatitis B 06/11/2015, 10/20/2015, 05/13/2016   • Pneumococcal Polysaccharide 01/01/2012   • Tdap 11/10/2014     Health Maintenance   Topic Date Due   • MEDICARE ANNUAL WELLNESS  08/24/2016   • DIABETIC EYE EXAM  08/24/2016   • INFLUENZA VACCINE  08/01/2017   • DIABETIC FOOT EXAM  11/09/2017   • LIPID PANEL  11/09/2017   • HEMOGLOBIN A1C  04/05/2018   • URINE MICROALBUMIN  10/05/2018   • MAMMOGRAM  01/04/2019   • PAP SMEAR  01/04/2020   • COLONOSCOPY  05/02/2021   • TDAP/TD VACCINES (2 - Td) 11/10/2024   • PNEUMOCOCCAL VACCINE (19-64 MEDIUM RISK)  Completed   • HEPATITIS C SCREENING  Completed       Current Meds:    Current Outpatient Prescriptions:   •  albuterol (ACCUNEB) 0.63 MG/3ML nebulizer solution, Take 3 mL by nebulization Every 6 (Six) Hours As Needed for Wheezing., Disp: 20 vial, Rfl: 0  •  albuterol (PROVENTIL HFA;VENTOLIN HFA) 108 (90 Base) MCG/ACT inhaler, Inhale 2 puffs Every 4 (Four) Hours As Needed for Wheezing., Disp: 1 inhaler, Rfl: 0  •  atorvastatin (LIPITOR) 20 MG tablet, Take 1 tablet by mouth Daily., Disp: 30 tablet, Rfl: 3  •  Blood Glucose Monitoring Suppl ([employer] BLOOD GLUCOSE METER) W/DEVICE kit, 1 each 3 (Three) Times a Day., Disp: 1  kit, Rfl: 3  •  cetirizine (zyrTEC) 10 MG tablet, Take 1 tablet by mouth Daily., Disp: 30 tablet, Rfl: 3  •  clobetasol (CLOBEX) 0.05 % lotion, apply to skin bid x 1-2w then prn. generic, Disp: 118 g, Rfl: 3  •  clopidogrel (PLAVIX) 75 MG tablet, Take 1 tablet by mouth Daily., Disp: 30 tablet, Rfl: 3  •  clopidogrel (PLAVIX) 75 MG tablet, TAKE 1 TABLET BY MOUTH DAILY., Disp: 30 tablet, Rfl: 3  •  cyclobenzaprine (FLEXERIL) 10 MG tablet, Take 1 tablet by mouth 2 (Two) Times a Day., Disp: 60 tablet, Rfl: 2  •  furosemide (LASIX) 40 MG tablet, Take 1 tablet by mouth Daily., Disp: 30 tablet, Rfl: 3  •  gabapentin (NEURONTIN) 800 MG tablet, Take 1 tablet by mouth 3 (Three) Times a Day., Disp: 90 tablet, Rfl: 3  •  glucose blood test strip, 1 each by Other route 4 (Four) Times a Day. For blood glucose monitoring. Dx: E11.628, Disp: 100 each, Rfl: 12  •  insulin glargine (LANTUS) 100 UNIT/ML injection, Inject 95 Units under the skin Every 12 (Twelve) Hours., Disp: 1 each, Rfl: 11  •  Insulin Lispro (HUMALOG KWIKPEN) 100 UNIT/ML solution pen-injector, Inject 45 Units under the skin 3 (Three) Times a Day Before Meals., Disp: 60 mL, Rfl: 11  •  Insulin Pen Needle (NOVOFINE) 30G X 8 MM misc, As directed 4 times daily, Disp: 100 each, Rfl: 11  •  ipratropium-albuterol (DUO-NEB) 0.5-2.5 mg/mL nebulizer, Take 3 mL by nebulization 4 (Four) Times a Day. ICD10 code J96.01, Disp: 360 mL, Rfl: 0  •  lisinopril (PRINIVIL,ZESTRIL) 10 MG tablet, Take 1 tablet by mouth Daily., Disp: 30 tablet, Rfl: 3  •  metFORMIN (GLUMETZA) 1000 MG (MOD) 24 hr tablet, Take 1 tablet by mouth 2 (Two) Times a Day With Meals., Disp: 60 tablet, Rfl: 3  •  metoprolol tartrate (LOPRESSOR) 50 MG tablet, Take 1 tablet by mouth 2 (Two) Times a Day., Disp: 60 tablet, Rfl: 3  •  montelukast (SINGULAIR) 10 MG tablet, Take 1 tablet by mouth Every Night., Disp: 30 tablet, Rfl: 3  •  naproxen (NAPROSYN) 500 MG tablet, Take 1 tablet by mouth 2 (Two) Times a Day With  Meals., Disp: 60 tablet, Rfl: 3  •  nitroglycerin (NITROSTAT) 0.4 MG SL tablet, Place 1 tablet under the tongue As Needed for Chest Pain. Take no more than 3 doses in 15 minutes., Disp: 30 tablet, Rfl: 3  •  nystatin (MYCOSTATIN) 169397 UNIT/GM powder, Apply  topically 2 (Two) Times a Day., Disp: 15 g, Rfl: 1  •  pantoprazole (PROTONIX) 40 MG EC tablet, Take 1 tablet by mouth Daily., Disp: 30 tablet, Rfl: 3  •  promethazine (PHENERGAN) 25 MG tablet, Take 1 tablet by mouth Every 6 (Six) Hours As Needed for Nausea or Vomiting., Disp: 30 tablet, Rfl: 0  •  SYMBICORT 160-4.5 MCG/ACT inhaler, INHALE 2 PUFFS INTO LUNGS 2 (TWO) TIMES A DAY-RINSE MOUTH AFTER USE, Disp: 10.2 g, Rfl: 6    Allergies:  Other    Family Hx:  Family History   Problem Relation Age of Onset   • Coronary artery disease Other    • Diabetes Other    • Heart disease Other    • Hypertension Other         Social History:  Social History     Social History   • Marital status:      Spouse name: N/A   • Number of children: N/A   • Years of education: N/A     Occupational History   • Not on file.     Social History Main Topics   • Smoking status: Current Every Day Smoker     Packs/day: 0.25     Types: Cigarettes   • Smokeless tobacco: Never Used   • Alcohol use No   • Drug use: No   • Sexual activity: Not on file     Other Topics Concern   • Not on file     Social History Narrative       Review of Systems  Review of Systems   Constitutional: Negative for activity change, appetite change, chills, diaphoresis, fatigue and unexpected weight change.   HENT: Negative for congestion, facial swelling, postnasal drip, rhinorrhea, sinus pressure, sneezing and sore throat.    Eyes: Negative for photophobia and visual disturbance.   Respiratory: Negative for cough, chest tightness, shortness of breath and wheezing.    Cardiovascular: Negative for chest pain, palpitations and leg swelling.   Gastrointestinal: Negative for abdominal pain, constipation, diarrhea,  "nausea and vomiting.   Endocrine: Positive for polydipsia, polyphagia and polyuria.   Genitourinary: Negative for difficulty urinating, dysuria and urgency.   Musculoskeletal: Negative for arthralgias, gait problem, joint swelling and neck stiffness.   Skin: Negative for color change, pallor and rash.   Neurological: Positive for numbness. Negative for seizures, syncope, speech difficulty, weakness, light-headedness and headaches.   Psychiatric/Behavioral: Negative for decreased concentration, dysphoric mood, sleep disturbance and suicidal ideas. The patient is not nervous/anxious.        Objective:     /87 (BP Location: Right arm, Patient Position: Sitting, Cuff Size: Large Adult)  Pulse 83  Ht 62\" (157.5 cm)  Wt 280 lb 6.4 oz (127 kg)  LMP  (LMP Unknown)  SpO2 96%  BMI 51.29 kg/m2    Physical Exam   Constitutional: She is oriented to person, place, and time. No distress.   Obese   HENT:   Head: Normocephalic and atraumatic.   Nose: Nose normal.   Mouth/Throat: Oropharynx is clear and moist. No oropharyngeal exudate.   Eyes: EOM are normal.   Neck: Normal range of motion. Neck supple.   Cardiovascular: Normal rate, regular rhythm, normal heart sounds and intact distal pulses.    Pulmonary/Chest: Effort normal. No respiratory distress. She exhibits no tenderness.   Abdominal: Soft. Bowel sounds are normal. She exhibits no distension. There is no tenderness.   Musculoskeletal: Normal range of motion. She exhibits no edema.   Neurological: She is alert and oriented to person, place, and time. Gait normal.   Skin: Skin is warm. She is not diaphoretic.   Psychiatric: Mood, memory, affect and judgment normal.     Diabetic foot exam:   Left: Filament test present              [place] test not examined   Pulses wnl   Reflexes present   Vibratory sensation diminished   Proprioception normal   Sharp/dull discrimination diminished       Right: Filament test present              [place] test not examined   Pulses " present   Reflexes wnl   Vibratory sensation diminished   Proprioception normal   Sharp/dull discrimination diminished     Feet - warm, good capillary refill       Lab Review  Glucose (mg/dL)   Date Value   08/26/2017 262 (H)   04/18/2017 133 (H)   04/17/2017 188 (H)     Glucose, Arterial (mmol/L)   Date Value   08/26/2017 276   04/16/2017 230     Sodium (mmol/L)   Date Value   08/26/2017 137   04/18/2017 140   04/17/2017 137     Sodium, Arterial (mmol/L)   Date Value   08/26/2017 138.1     Potassium (mmol/L)   Date Value   08/26/2017 4.2   04/18/2017 4.4   04/17/2017 4.3     Chloride (mmol/L)   Date Value   08/26/2017 102   04/18/2017 108   04/17/2017 106     CO2 (mmol/L)   Date Value   08/26/2017 24.0   04/18/2017 24.0   04/17/2017 20.0 (L)     BUN (mg/dL)   Date Value   08/26/2017 45 (H)   04/18/2017 16   04/17/2017 26 (H)     Creatinine (mg/dL)   Date Value   08/26/2017 1.01 (H)   04/18/2017 0.86   04/17/2017 1.07 (H)     Hemoglobin A1C (%)   Date Value   10/05/2017 11.7 (H)   07/10/2017 12.92 (H)   04/16/2017 12.86 (H)     Triglycerides (mg/dl)   Date Value   11/09/2016 >525 (H)   08/10/2016 310 (H)   03/27/2015 295 (H)       none     Assessment:     Yamileth was seen today for follow-up.    Diagnoses and all orders for this visit:    Uncontrolled type 2 diabetes mellitus with complication, with long-term current use of insulin          -     Not well controlled on current regimen which includes Metformin 1000mg BID, Lantus 95units q12H, Humalog 45 units with meals.  Will start SGLT-2 Inhibitor and GLP-1.  Patient was given copay cards for Jardiance/Victoza; will complete PA if needed.  Instructed patient to stop Lasix as Jardiance will work as a diuretic.       -    She was given Blood Glucose Log to track her sugars (fasting, 2hr postprandial and PM).   -     Empagliflozin (JARDIANCE) 10 MG tablet; Take 10 mg by mouth Every Morning.  -     Liraglutide (VICTOZA) 18 MG/3ML solution pen-injector injection; 0.6mg  subcutaneously every day for 7 days then 1.2mg subcutaneously every morning    Need for influenza vaccination  -     Flu Vaccine Quad PF 3YR+ (FLUARIX/FLUZONE [id number])      Plan:     1.  Rx changes: none  2.  Education: Reviewed ‘ABCs’ of diabetes management (respective goals in parentheses):  A1C (<7), preprandial glucose (70 mg/dl - 130 mg/dl), postprandial glucose (< 180 mg/dl), blood pressure (<130/80), and cholesterol (LDL <100 mg/dl; HDL > 50 mg/dl; Trig < 150 mg/dl).  3.  Issues reviewed with Yamileth Slater: low cholesterol diet, weight control and daily exercise discussed, home glucose monitoring emphasized, foot care discussed and Podiatry visits discussed, annual eye examinations at Ophthalmology discussed, long term diabetic complications discussed, patient urged in the strongest terms to quit smoking and follow ADA diet.  4.  Compliance at present is estimated to be fair. Efforts to improve compliance (if necessary) will be directed at dietary modifications: ADA diet, increased exercise and regular blood sugar monitoring: 3 times daily.    Return in about 1 month (around 11/19/2017) for DM2. for med adherence.    GOALS:  Control of Blood Glucose    BARRIERS TO GOALS:  Compliance    Preventative:  Female Preventative:  Colon cancer screening is up to date   Recommended:Influenza   Smoking cessation counseling was provided.  does not drink  eat more fruits and vegetables, decrease soda or juice intake, increase water intake, increase physical activity, reduce portion size, cut out extra servings, reduce fast food intake and have 3 meals a day    RISK SCORE: 4     Signature  Yadira Delarosa MD PGY3  [place] [address]  Office: [phone]    This document has been electronically signed by Yadira Delarosa MD on October 19, 2017 9:49 AM

## 2017-10-19 NOTE — TELEPHONE ENCOUNTER
[facility] LEFT .    RECEIVED RX FOR JARDANCE, BUT SAID PATIENT WAS EXPECTING RX FOR VICTOZA (?) PEN.    PLEASE CALL  [phone]    THANK YOU

## 2017-10-19 NOTE — PROGRESS NOTES
I have reviewed the notes, assessments, and/or procedures performed. I concur with her/his documentation of Yamileth Slater.     Nirmal Pierson, DO

## 2017-10-24 ENCOUNTER — TELEPHONE (OUTPATIENT)
Dept: FAMILY MEDICINE CLINIC | Facility: CLINIC | Age: 57
End: 2017-10-24

## 2017-10-24 DIAGNOSIS — E66.9 DIABETES MELLITUS TYPE 2 IN OBESE (HCC): ICD-10-CM

## 2017-10-24 DIAGNOSIS — E11.69 DIABETES MELLITUS TYPE 2 IN OBESE (HCC): ICD-10-CM

## 2017-10-24 RX ORDER — CLOPIDOGREL BISULFATE 75 MG/1
TABLET ORAL
Qty: 30 TABLET | Refills: 3 | Status: SHIPPED | OUTPATIENT
Start: 2017-10-24 | End: 2018-02-02 | Stop reason: SDUPTHER

## 2017-10-24 RX ORDER — POTASSIUM CHLORIDE 750 MG/1
TABLET, FILM COATED, EXTENDED RELEASE ORAL
Qty: 30 TABLET | Refills: 3 | Status: SHIPPED | OUTPATIENT
Start: 2017-10-24 | End: 2018-02-02 | Stop reason: SDUPTHER

## 2017-10-24 RX ORDER — LISINOPRIL 10 MG/1
TABLET ORAL
Qty: 30 TABLET | Refills: 3 | Status: SHIPPED | OUTPATIENT
Start: 2017-10-24 | End: 2018-02-02 | Stop reason: SDUPTHER

## 2017-10-24 RX ORDER — OMEPRAZOLE AND SODIUM BICARBONATE 40; 1100 MG/1; MG/1
CAPSULE ORAL
Qty: 30 CAPSULE | Refills: 6 | Status: SHIPPED | OUTPATIENT
Start: 2017-10-24 | End: 2017-11-29

## 2017-10-25 DIAGNOSIS — IMO0002 UNCONTROLLED TYPE 2 DIABETES MELLITUS WITH COMPLICATION, WITH LONG-TERM CURRENT USE OF INSULIN: ICD-10-CM

## 2017-10-25 DIAGNOSIS — E66.9 DIABETES MELLITUS TYPE 2 IN OBESE (HCC): ICD-10-CM

## 2017-10-25 DIAGNOSIS — E11.69 DIABETES MELLITUS TYPE 2 IN OBESE (HCC): ICD-10-CM

## 2017-11-06 DIAGNOSIS — IMO0002 UNCONTROLLED TYPE 2 DIABETES MELLITUS WITH COMPLICATION, WITH LONG-TERM CURRENT USE OF INSULIN: ICD-10-CM

## 2017-11-07 RX ORDER — EMPAGLIFLOZIN 10 MG/1
TABLET, FILM COATED ORAL
Qty: 30 TABLET | Refills: 0 | Status: SHIPPED | OUTPATIENT
Start: 2017-11-07 | End: 2017-11-16 | Stop reason: SDUPTHER

## 2017-11-10 DIAGNOSIS — R52 PAIN: ICD-10-CM

## 2017-11-10 RX ORDER — NAPROXEN 500 MG/1
TABLET ORAL
Qty: 60 TABLET | Refills: 3 | Status: SHIPPED | OUTPATIENT
Start: 2017-11-10 | End: 2018-03-01 | Stop reason: SDUPTHER

## 2017-11-10 RX ORDER — CETIRIZINE HYDROCHLORIDE 10 MG/1
TABLET ORAL
Qty: 30 TABLET | Refills: 3 | Status: SHIPPED | OUTPATIENT
Start: 2017-11-10 | End: 2018-03-01 | Stop reason: SDUPTHER

## 2017-11-16 ENCOUNTER — TELEPHONE (OUTPATIENT)
Dept: FAMILY MEDICINE CLINIC | Facility: CLINIC | Age: 57
End: 2017-11-16

## 2017-11-16 DIAGNOSIS — IMO0002 UNCONTROLLED TYPE 2 DIABETES MELLITUS WITH COMPLICATION, WITH LONG-TERM CURRENT USE OF INSULIN: ICD-10-CM

## 2017-11-16 NOTE — TELEPHONE ENCOUNTER
PT CALLED, SAID THAT SHE COULD NOT MAKE THE APPT TODAY FOR VIDA SO RE SCHEDULED TO ANOTHER DAY LATER THIS MONTH.        PT IS WANTING TO KNOW IF YOU COULD PLEASE SEND IN JARDIANCE TO [facility]

## 2017-11-29 ENCOUNTER — OFFICE VISIT (OUTPATIENT)
Dept: FAMILY MEDICINE CLINIC | Facility: CLINIC | Age: 57
End: 2017-11-29

## 2017-11-29 VITALS
WEIGHT: 287 LBS | HEIGHT: 62 IN | HEART RATE: 104 BPM | BODY MASS INDEX: 52.81 KG/M2 | DIASTOLIC BLOOD PRESSURE: 78 MMHG | OXYGEN SATURATION: 93 % | SYSTOLIC BLOOD PRESSURE: 122 MMHG

## 2017-11-29 DIAGNOSIS — IMO0002 UNCONTROLLED TYPE 2 DIABETES MELLITUS WITH COMPLICATION, WITH LONG-TERM CURRENT USE OF INSULIN: Primary | ICD-10-CM

## 2017-11-29 PROCEDURE — 99213 OFFICE O/P EST LOW 20 MIN: CPT | Performed by: FAMILY MEDICINE

## 2017-11-29 NOTE — PROGRESS NOTES
I discussed the case with the resident and I agree with their assessment which includes Diabetes Type II and plan as outlined by Dr. Delarosa.  Quan Brewer MD.

## 2017-11-29 NOTE — PROGRESS NOTES
"  Subjective:     Yamileth Slater is a 57 y.o. female who presents for refills of medications for uncontrolled DM2.     Diabetes HPI:  The patient was initially diagnosed with Type 2 diabetes mellitus based on the following criteria:  Elevated A1C and random blood glucose.    Known diabetic complications: peripheral neuropathy and cardiovascular disease  Cardiovascular risk factors: diabetes mellitus, dyslipidemia, hypertension, obesity (BMI >= 30 kg/m2), sedentary lifestyle and smoking/ tobacco exposure  Current diabetic medications include Metformin 1000 mg BID; Lantus 95units qhs & 95units qAM; Humalog 45-60units sliding scale with meals; Jardiance 10mg daily; Victoza.     Eye exam current (within one year): no - would like to be referred to Dr. New in [place].  Weight trend: increasing steadily  Prior visit with dietician: yes - in hospital  Current diet: in general, an \"unhealthy\" diet, but has been avoiding [employer] as recommended; not limiting salt  Current exercise: none    Current monitoring regimen: home blood tests - 2-3 times daily  Home blood sugar records: Fasting 120-230, 2hr postprandial: 100-200s PM: 130s-230s    Any episodes of hypoglycemia? no     ACE inhibitor or angiotensin II receptor blocker?     Yes     lisinopril (generic)        Statin?     Yes    ASA?     Yes     Hemoglobin A1C (%)   Date Value   10/05/2017 11.7 (H)   07/10/2017 12.92 (H)   04/16/2017 12.86 (H)       COMORBID CONDITIONS:     ObesityYes    HypothyroidismNo    HyperlipidemiaYes    CADYes    Cerebrovascular diseaseNo   PVDNo    HTNYes    Sexual dysfunctionNo    DepressionNo     The following portions of the patient's history were reviewed and updated as appropriate: allergies, current medications, past family history, past medical history, past social history, past surgical history and problem list.    Preventative:  Over the past 2 weeks, have you felt down, depressed, or hopeless?No   Over the past 2 weeks, " have you felt little interest or pleasure in doing things?No  Clinical depression screening refused by patient.No     On osteoporosis therapy?No     Past Medical Hx:  Past Medical History:   Diagnosis Date   • Anxiety states    • Asthma    • Carotid artery stenosis     DWIGHT 0-15%, LICA 0-15%. LICA stent 5/2014.   • Chronic folliculitis    • Chronic obstructive lung disease    • Coronary arteriosclerosis    • Diabetes mellitus     no retinopathy; A1C 9.1      • Dyslipidemia    • Essential hypertension    • Excoriated eczema     asteosis/keratosis   • GERD (gastroesophageal reflux disease)    • History of colon polyps    • Hypertensive disorder    • Kidney stone    • Mixed hyperlipidemia    • Morbid obesity due to excess calories     BMI 44.5   • Neurologic disorder associated with diabetes mellitus    • Non-healing surgical wound     LLE EVHa   • Peripheral vascular disease    • Sleep apnea    • Tobacco dependence syndrome     1/2ppd      • Type 2 diabetes mellitus    • Vitamin D deficiency        Past Surgical Hx:  Past Surgical History:   Procedure Laterality Date   • CARDIAC CATHETERIZATION  08/09/2013    Severe multivessel CAD with critical lesions noted in the RCA, obtuse marginal two, ramus intermemdious, and LAD as described above. Normal LV systolic function with no wall motion abnormality.    • CARDIAC CATHETERIZATION  05/27/2014     LEFT Carotid Stent    • CAROTID STENT Left 05/27/2014   • COLONOSCOPY  05/02/2016    Normal colon.Diverticulosis in the sigmoid colon.No specimens collected.    • CORONARY ARTERY BYPASS GRAFT  11/15/2013    CABG x 3 with LIMA to LAD and coronary endarterectomy to LAD, SVG to Ramus intermedius branch and SVG to PDA.    • ENDOSCOPY  09/23/2015     Esophageal stricture present.Normal stomach.Normal duodenum.    • ENDOSCOPY  11/16/2015    w/ dilatation - Esophageal stricture present,Dilatation performed.Normal stomach and duodenum.    • INCISION AND DRAINAGE ABSCESS  01/02/2016     Incision and drainage of right perineal abscess.    • INCISION AND DRAINAGE ABSCESS  02/18/2014    Incision and Drainage of abscess of left lower leg    • OTHER SURGICAL HISTORY  01/25/2016    DESTRUCTION OF BENIGN LESION      • OTHER SURGICAL HISTORY  04/18/2014    ear/neck - L attempted CEA, Neck Dissection        Health Maintenance:  Immunization History   Administered Date(s) Administered   • Flu Vaccine High Dose PF 65YR+ 11/10/2014   • Flu Vaccine Quad PF >36MO 10/19/2017   • Hepatitis B 06/11/2015, 10/20/2015, 05/13/2016   • Pneumococcal Polysaccharide 01/01/2012   • Tdap 11/10/2014     Health Maintenance   Topic Date Due   • MEDICARE ANNUAL WELLNESS  08/24/2016   • DIABETIC EYE EXAM  08/24/2016   • DIABETIC FOOT EXAM  11/09/2017   • LIPID PANEL  11/09/2017   • HEMOGLOBIN A1C  04/05/2018   • URINE MICROALBUMIN  10/05/2018   • MAMMOGRAM  01/04/2019   • PAP SMEAR  01/04/2020   • COLONOSCOPY  05/02/2021   • TDAP/TD VACCINES (2 - Td) 11/10/2024   • PNEUMOCOCCAL VACCINE (19-64 MEDIUM RISK)  Completed   • HEPATITIS C SCREENING  Completed   • INFLUENZA VACCINE  Addressed       Current Meds:    Current Outpatient Prescriptions:   •  albuterol (ACCUNEB) 0.63 MG/3ML nebulizer solution, Take 3 mL by nebulization Every 6 (Six) Hours As Needed for Wheezing., Disp: 20 vial, Rfl: 0  •  albuterol (PROVENTIL HFA;VENTOLIN HFA) 108 (90 Base) MCG/ACT inhaler, Inhale 2 puffs Every 4 (Four) Hours As Needed for Wheezing., Disp: 1 inhaler, Rfl: 0  •  atorvastatin (LIPITOR) 20 MG tablet, Take 1 tablet by mouth Daily., Disp: 30 tablet, Rfl: 3  •  Blood Glucose Monitoring Suppl ([employer] BLOOD GLUCOSE METER) W/DEVICE kit, 1 each 3 (Three) Times a Day., Disp: 1 kit, Rfl: 3  •  cetirizine (zyrTEC) 10 MG tablet, TAKE 1 TABLET BY MOUTH DAILY., Disp: 30 tablet, Rfl: 3  •  clobetasol (CLOBEX) 0.05 % lotion, apply to skin bid x 1-2w then prn. generic, Disp: 118 g, Rfl: 3  •  clopidogrel (PLAVIX) 75 MG tablet, TAKE 1 TABLET BY MOUTH DAILY., Disp:  30 tablet, Rfl: 3  •  clopidogrel (PLAVIX) 75 MG tablet, TAKE 1 TABLET BY MOUTH DAILY., Disp: 30 tablet, Rfl: 3  •  cyclobenzaprine (FLEXERIL) 10 MG tablet, Take 1 tablet by mouth 2 (Two) Times a Day., Disp: 60 tablet, Rfl: 2  •  Empagliflozin (JARDIANCE) 10 MG tablet, Take 10 mg by mouth Every Morning., Disp: 30 tablet, Rfl: 3  •  furosemide (LASIX) 40 MG tablet, Take 1 tablet by mouth Daily., Disp: 30 tablet, Rfl: 3  •  gabapentin (NEURONTIN) 800 MG tablet, Take 1 tablet by mouth 3 (Three) Times a Day., Disp: 90 tablet, Rfl: 3  •  glucose blood test strip, 1 each by Other route 4 (Four) Times a Day. For blood glucose monitoring. Dx: E11.628, Disp: 100 each, Rfl: 12  •  insulin glargine (LANTUS) 100 UNIT/ML injection, Inject 95 Units under the skin Every 12 (Twelve) Hours., Disp: 1 each, Rfl: 11  •  Insulin Lispro (HUMALOG KWIKPEN) 100 UNIT/ML solution pen-injector, Inject 45 Units under the skin 3 (Three) Times a Day Before Meals., Disp: 60 mL, Rfl: 11  •  Insulin Pen Needle (NOVOFINE) 30G X 8 MM misc, As directed 4 times daily, Disp: 100 each, Rfl: 11  •  ipratropium-albuterol (DUO-NEB) 0.5-2.5 mg/mL nebulizer, Take 3 mL by nebulization 4 (Four) Times a Day. ICD10 code J96.01, Disp: 360 mL, Rfl: 0  •  Liraglutide (VICTOZA) 18 MG/3ML solution pen-injector injection, 0.6mg subcutaneously every day for 7 days then 1.2mg subcutaneously every morning, Disp: 6 pen, Rfl: 0  •  lisinopril (PRINIVIL,ZESTRIL) 10 MG tablet, TAKE 1 TABLET BY MOUTH DAILY., Disp: 30 tablet, Rfl: 3  •  metFORMIN (GLUMETZA) 1000 MG (MOD) 24 hr tablet, Take 1 tablet by mouth 2 (Two) Times a Day With Meals., Disp: 60 tablet, Rfl: 3  •  metoprolol tartrate (LOPRESSOR) 50 MG tablet, Take 1 tablet by mouth 2 (Two) Times a Day., Disp: 60 tablet, Rfl: 3  •  montelukast (SINGULAIR) 10 MG tablet, Take 1 tablet by mouth Every Night., Disp: 30 tablet, Rfl: 3  •  naproxen (NAPROSYN) 500 MG tablet, TAKE 1 TABLET BY MOUTH 2 (TWO) TIMES A DAY WITH MEALS.,  Disp: 60 tablet, Rfl: 3  •  nitroglycerin (NITROSTAT) 0.4 MG SL tablet, Place 1 tablet under the tongue As Needed for Chest Pain. Take no more than 3 doses in 15 minutes., Disp: 30 tablet, Rfl: 3  •  nystatin (MYCOSTATIN) 845279 UNIT/GM powder, Apply  topically 2 (Two) Times a Day., Disp: 15 g, Rfl: 1  •  omeprazole-sodium bicarbonate (ZEGERID)  MG per capsule, TAKE ONE CAPSULE BY MOUTH DAILY, Disp: 30 capsule, Rfl: 6  •  pantoprazole (PROTONIX) 40 MG EC tablet, Take 1 tablet by mouth Daily., Disp: 30 tablet, Rfl: 3  •  potassium chloride (K-DUR) 10 MEQ CR tablet, TAKE 1 TABLET BY MOUTH EVERY NIGHT., Disp: 30 tablet, Rfl: 3  •  promethazine (PHENERGAN) 25 MG tablet, Take 1 tablet by mouth Every 6 (Six) Hours As Needed for Nausea or Vomiting., Disp: 30 tablet, Rfl: 0  •  SYMBICORT 160-4.5 MCG/ACT inhaler, INHALE 2 PUFFS INTO LUNGS 2 (TWO) TIMES A DAY-RINSE MOUTH AFTER USE, Disp: 10.2 g, Rfl: 6    Allergies:  Other    Family Hx:  Family History   Problem Relation Age of Onset   • Coronary artery disease Other    • Diabetes Other    • Heart disease Other    • Hypertension Other         Social History:  Social History     Social History   • Marital status:      Spouse name: N/A   • Number of children: N/A   • Years of education: N/A     Occupational History   • Not on file.     Social History Main Topics   • Smoking status: Current Every Day Smoker     Packs/day: 0.25     Types: Cigarettes   • Smokeless tobacco: Never Used   • Alcohol use No   • Drug use: No   • Sexual activity: Not on file     Other Topics Concern   • Not on file     Social History Narrative       Review of Systems  Review of Systems   Constitutional: Negative for activity change, appetite change, diaphoresis, fever and unexpected weight change.   HENT: Negative for congestion, facial swelling, postnasal drip, rhinorrhea, sinus pressure, sneezing and sore throat.    Eyes: Negative for photophobia and visual disturbance.   Respiratory:  "Negative for cough, chest tightness, shortness of breath and wheezing.    Cardiovascular: Positive for leg swelling. Negative for chest pain and palpitations.   Gastrointestinal: Negative for abdominal pain, constipation, diarrhea, nausea and vomiting.   Endocrine: Positive for polydipsia, polyphagia and polyuria.   Genitourinary: Negative for decreased urine volume, difficulty urinating, dysuria and urgency.   Musculoskeletal: Negative for arthralgias, gait problem, joint swelling and neck stiffness.   Skin: Positive for wound (left leg). Negative for color change and rash.   Neurological: Positive for numbness. Negative for seizures, syncope, speech difficulty, weakness, light-headedness and headaches.   Psychiatric/Behavioral: Negative for decreased concentration, dysphoric mood, sleep disturbance and suicidal ideas. The patient is not nervous/anxious.        Objective:     /78  Pulse 104  Ht 62\" (157.5 cm)  Wt 287 lb (130 kg)  LMP  (LMP Unknown)  SpO2 93%  BMI 52.49 kg/m2    Physical Exam   Constitutional: She is oriented to person, place, and time and well-developed, well-nourished, and in no distress. No distress.   Obese   HENT:   Head: Normocephalic and atraumatic.   Nose: Nose normal.   Mouth/Throat: Oropharynx is clear and moist. No oropharyngeal exudate.   Eyes: Conjunctivae and EOM are normal.   Neck: Normal range of motion. Neck supple. No tracheal deviation present.   Cardiovascular: Normal rate, regular rhythm, normal heart sounds and intact distal pulses.    Pulmonary/Chest: Effort normal. No respiratory distress. She exhibits no tenderness.   Abdominal: Soft. Bowel sounds are normal. She exhibits no distension. There is no tenderness.   Musculoskeletal: Normal range of motion. She exhibits edema (1+ pitting bilateral lower extremetries).   Neurological: She is alert and oriented to person, place, and time. Gait normal.   Skin: Skin is warm. Abrasion (left shin with some surrounding " erythema) noted. She is not diaphoretic.   Psychiatric: Mood, memory, affect and judgment normal.     Diabetic foot exam:   Left: Filament test present              Grady test not examined   Pulses wnl   Reflexes present   Vibratory sensation diminished   Proprioception normal   Sharp/dull discrimination diminished       Right: Filament test present              Grady test not examined   Pulses present   Reflexes wnl   Vibratory sensation diminished   Proprioception normal   Sharp/dull discrimination diminished     Feet - warm, good capillary refill       Lab Review  Glucose (mg/dL)   Date Value   08/26/2017 262 (H)   04/18/2017 133 (H)   04/17/2017 188 (H)     Glucose, Arterial (mmol/L)   Date Value   08/26/2017 276   04/16/2017 230     Sodium (mmol/L)   Date Value   08/26/2017 137   04/18/2017 140   04/17/2017 137     Sodium, Arterial (mmol/L)   Date Value   08/26/2017 138.1     Potassium (mmol/L)   Date Value   08/26/2017 4.2   04/18/2017 4.4   04/17/2017 4.3     Chloride (mmol/L)   Date Value   08/26/2017 102   04/18/2017 108   04/17/2017 106     CO2 (mmol/L)   Date Value   08/26/2017 24.0   04/18/2017 24.0   04/17/2017 20.0 (L)     BUN (mg/dL)   Date Value   08/26/2017 45 (H)   04/18/2017 16   04/17/2017 26 (H)     Creatinine (mg/dL)   Date Value   08/26/2017 1.01 (H)   04/18/2017 0.86   04/17/2017 1.07 (H)     Hemoglobin A1C (%)   Date Value   10/05/2017 11.7 (H)   07/10/2017 12.92 (H)   04/16/2017 12.86 (H)     Triglycerides (mg/dl)   Date Value   11/09/2016 >525 (H)   08/10/2016 310 (H)   03/27/2015 295 (H)       none     Assessment:     Yamileth was seen today for diabetes and med refill.    Diagnoses and all orders for this visit:    Uncontrolled type 2 diabetes mellitus with complication, with long-term current use of insulin      Plan:     1.  Rx changes: none  2.  Education: Reviewed ‘ABCs’ of diabetes management (respective goals in parentheses):  A1C (<7), preprandial glucose (70 mg/dl - 130 mg/dl),  postprandial glucose (< 180 mg/dl), blood pressure (<130/80), and cholesterol (LDL <100 mg/dl; HDL > 50 mg/dl; Trig < 150 mg/dl).  3.  Issues reviewed with Yamileth Slater: low cholesterol diet, weight control and daily exercise discussed, home glucose monitoring emphasized, foot care discussed and Podiatry visits discussed, annual eye examinations at Ophthalmology discussed, long term diabetic complications discussed, patient urged in the strongest terms to quit smoking and follow ADA diet.  4.  Compliance at present is estimated to be fair. Efforts to improve compliance (if necessary) will be directed at dietary modifications: ADA diet, increased exercise and regular blood sugar monitoring: 3 times daily.    Return in about 6 weeks (around 1/10/2018). for med adherence.    GOALS:  Control of Blood Glucose    BARRIERS TO GOALS:  Compliance    Preventative:  Female Preventative:  Colon cancer screening is up to date   Recommended:none       Smoking cessation counseling was provided.  does not drink  eat more fruits and vegetables, decrease soda or juice intake, increase water intake, increase physical activity, reduce portion size, cut out extra servings, reduce fast food intake and have 3 meals a day    RISK SCORE: 4     Signature  Yadira Delarosa MD PGY3  [place] [address]  Office: [phone]    This document has been electronically signed by Yadira Delarosa MD on November 29, 2017 11:17 AM

## 2017-12-06 DIAGNOSIS — J44.0 CHRONIC OBSTRUCTIVE PULMONARY DISEASE WITH ACUTE LOWER RESPIRATORY INFECTION (HCC): ICD-10-CM

## 2017-12-06 RX ORDER — ATORVASTATIN CALCIUM 20 MG/1
TABLET, FILM COATED ORAL
Qty: 30 TABLET | Refills: 3 | Status: SHIPPED | OUTPATIENT
Start: 2017-12-06 | End: 2018-04-05

## 2017-12-06 RX ORDER — MONTELUKAST SODIUM 10 MG/1
TABLET ORAL
Qty: 30 TABLET | Refills: 3 | Status: SHIPPED | OUTPATIENT
Start: 2017-12-06 | End: 2018-04-05 | Stop reason: SDUPTHER

## 2017-12-18 DIAGNOSIS — IMO0002 UNCONTROLLED TYPE 2 DIABETES MELLITUS WITH COMPLICATION, WITH LONG-TERM CURRENT USE OF INSULIN: ICD-10-CM

## 2018-01-04 ENCOUNTER — TELEPHONE (OUTPATIENT)
Dept: FAMILY MEDICINE CLINIC | Facility: CLINIC | Age: 58
End: 2018-01-04

## 2018-01-04 DIAGNOSIS — E11.69 DIABETES MELLITUS TYPE 2 IN OBESE (HCC): ICD-10-CM

## 2018-01-04 DIAGNOSIS — E66.9 DIABETES MELLITUS TYPE 2 IN OBESE (HCC): ICD-10-CM

## 2018-01-04 DIAGNOSIS — IMO0002 UNCONTROLLED TYPE 2 DIABETES MELLITUS WITH COMPLICATION, WITH LONG-TERM CURRENT USE OF INSULIN: ICD-10-CM

## 2018-01-04 RX ORDER — GABAPENTIN 800 MG/1
TABLET ORAL
Qty: 90 TABLET | Refills: 3 | OUTPATIENT
Start: 2018-01-04

## 2018-01-04 RX ORDER — GABAPENTIN 800 MG/1
800 TABLET ORAL 3 TIMES DAILY
Qty: 90 TABLET | Refills: 0 | Status: SHIPPED | OUTPATIENT
Start: 2018-01-04 | End: 2018-02-02 | Stop reason: SDUPTHER

## 2018-01-09 DIAGNOSIS — E66.9 DIABETES MELLITUS TYPE 2 IN OBESE (HCC): ICD-10-CM

## 2018-01-09 DIAGNOSIS — E11.69 DIABETES MELLITUS TYPE 2 IN OBESE (HCC): ICD-10-CM

## 2018-01-09 RX ORDER — GABAPENTIN 800 MG/1
TABLET ORAL
Qty: 90 TABLET | Refills: 3 | OUTPATIENT
Start: 2018-01-09

## 2018-01-16 DIAGNOSIS — IMO0002 UNCONTROLLED TYPE 2 DIABETES MELLITUS WITH COMPLICATION, WITH LONG-TERM CURRENT USE OF INSULIN: ICD-10-CM

## 2018-01-17 RX ORDER — LIRAGLUTIDE 6 MG/ML
INJECTION SUBCUTANEOUS
Qty: 9 ML | Refills: 0 | Status: SHIPPED | OUTPATIENT
Start: 2018-01-17 | End: 2018-02-14 | Stop reason: SDUPTHER

## 2018-01-22 DIAGNOSIS — E11.69 DIABETES MELLITUS TYPE 2 IN OBESE (HCC): ICD-10-CM

## 2018-01-22 DIAGNOSIS — E66.9 DIABETES MELLITUS TYPE 2 IN OBESE (HCC): ICD-10-CM

## 2018-01-22 RX ORDER — INSULIN LISPRO 100 [IU]/ML
INJECTION, SOLUTION INTRAVENOUS; SUBCUTANEOUS
Qty: 60 ML | Refills: 11 | Status: SHIPPED | OUTPATIENT
Start: 2018-01-22 | End: 2018-04-05 | Stop reason: SDUPTHER

## 2018-02-02 ENCOUNTER — OFFICE VISIT (OUTPATIENT)
Dept: FAMILY MEDICINE CLINIC | Facility: CLINIC | Age: 58
End: 2018-02-02

## 2018-02-02 VITALS
WEIGHT: 273 LBS | HEIGHT: 62 IN | OXYGEN SATURATION: 95 % | SYSTOLIC BLOOD PRESSURE: 146 MMHG | BODY MASS INDEX: 50.24 KG/M2 | HEART RATE: 95 BPM | DIASTOLIC BLOOD PRESSURE: 80 MMHG

## 2018-02-02 DIAGNOSIS — E11.69 DIABETES MELLITUS TYPE 2 IN OBESE (HCC): ICD-10-CM

## 2018-02-02 DIAGNOSIS — D50.8 OTHER IRON DEFICIENCY ANEMIA: Primary | ICD-10-CM

## 2018-02-02 DIAGNOSIS — IMO0002 UNCONTROLLED TYPE 2 DIABETES MELLITUS WITH COMPLICATION, WITH LONG-TERM CURRENT USE OF INSULIN: ICD-10-CM

## 2018-02-02 DIAGNOSIS — E66.9 DIABETES MELLITUS TYPE 2 IN OBESE (HCC): ICD-10-CM

## 2018-02-02 PROCEDURE — 99213 OFFICE O/P EST LOW 20 MIN: CPT | Performed by: FAMILY MEDICINE

## 2018-02-02 RX ORDER — CLOPIDOGREL BISULFATE 75 MG/1
75 TABLET ORAL DAILY
Qty: 30 TABLET | Refills: 3 | Status: SHIPPED | OUTPATIENT
Start: 2018-02-02 | End: 2018-04-05 | Stop reason: SDUPTHER

## 2018-02-02 RX ORDER — CYCLOBENZAPRINE HCL 10 MG
10 TABLET ORAL 2 TIMES DAILY PRN
Qty: 60 TABLET | Refills: 2 | Status: SHIPPED | OUTPATIENT
Start: 2018-02-02 | End: 2018-04-05 | Stop reason: SDUPTHER

## 2018-02-02 RX ORDER — PROMETHAZINE HYDROCHLORIDE 25 MG/1
25 TABLET ORAL EVERY 6 HOURS PRN
Qty: 30 TABLET | Refills: 0 | Status: SHIPPED | OUTPATIENT
Start: 2018-02-02 | End: 2018-09-04 | Stop reason: SDUPTHER

## 2018-02-02 RX ORDER — GABAPENTIN 800 MG/1
800 TABLET ORAL 3 TIMES DAILY
Qty: 90 TABLET | Refills: 2 | Status: SHIPPED | OUTPATIENT
Start: 2018-02-02 | End: 2018-04-04 | Stop reason: SDUPTHER

## 2018-02-02 RX ORDER — LISINOPRIL 10 MG/1
10 TABLET ORAL DAILY
Qty: 30 TABLET | Refills: 3 | Status: SHIPPED | OUTPATIENT
Start: 2018-02-02 | End: 2018-04-05 | Stop reason: SDUPTHER

## 2018-02-02 RX ORDER — CLOBETASOL PROPIONATE 0.5 MG/ML
LOTION TOPICAL
Qty: 118 ML | Refills: 0 | Status: SHIPPED | OUTPATIENT
Start: 2018-02-02 | End: 2018-09-04 | Stop reason: SDUPTHER

## 2018-02-02 RX ORDER — METFORMIN HYDROCHLORIDE 1000 MG/1
1000 TABLET, FILM COATED, EXTENDED RELEASE ORAL 2 TIMES DAILY WITH MEALS
Qty: 60 TABLET | Refills: 3 | Status: SHIPPED | OUTPATIENT
Start: 2018-02-02 | End: 2018-04-05 | Stop reason: SDUPTHER

## 2018-02-02 RX ORDER — POTASSIUM CHLORIDE 750 MG/1
10 TABLET, FILM COATED, EXTENDED RELEASE ORAL NIGHTLY
Qty: 30 TABLET | Refills: 3 | Status: SHIPPED | OUTPATIENT
Start: 2018-02-02 | End: 2018-06-21 | Stop reason: SDUPTHER

## 2018-02-02 RX ORDER — NYSTATIN 100000 [USP'U]/G
POWDER TOPICAL 3 TIMES DAILY
Qty: 15 G | Refills: 1 | Status: SHIPPED | OUTPATIENT
Start: 2018-02-02 | End: 2018-07-25 | Stop reason: SDUPTHER

## 2018-02-02 RX ORDER — FERROUS SULFATE 325(65) MG
325 TABLET ORAL
Qty: 30 TABLET | Refills: 3 | Status: SHIPPED | OUTPATIENT
Start: 2018-02-02 | End: 2018-04-05 | Stop reason: SDUPTHER

## 2018-02-02 NOTE — PROGRESS NOTES
"  Subjective:     Yamileth Slater is a 57 y.o. female who presents follow up of uncontrolled DM2.  She was recently discharged from a hospital in [place] for pneumonia - reports she still has a slight cough but is finished with her antibiotics.    Diabetes HPI:  The patient was initially diagnosed with Type 2 diabetes mellitus based on the following criteria:  Elevated A1C and random blood glucose.    Known diabetic complications: peripheral neuropathy and cardiovascular disease  Cardiovascular risk factors: diabetes mellitus, dyslipidemia, hypertension, obesity (BMI >= 30 kg/m2), sedentary lifestyle and smoking/ tobacco exposure  Current diabetic medications include Metformin 1000 mg BID; Lantus 95units qhs; Humalog 45-60units sliding scale with meals; Jardiance 10mg daily; Victoza.     Eye exam current (within one year): no - would like to be referred to Dr. New in [place].  Weight trend: increasing steadily  Prior visit with dietician: yes - in hospital  Current diet: in general, an \"unhealthy\" diet, but has been avoiding [employer] as recommended; not limiting salt  Current exercise: none    Current monitoring regimen: home blood tests - 2-3 times daily  Home blood sugar records: Fasting 120-230, 2hr postprandial: 100-200s PM: 240s    Any episodes of hypoglycemia? no     ACE inhibitor or angiotensin II receptor blocker?     Yes     lisinopril (generic)        Statin?     Yes    ASA?     Yes     Hemoglobin A1C (%)   Date Value   10/05/2017 11.7 (H)   07/10/2017 12.92 (H)   04/16/2017 12.86 (H)       COMORBID CONDITIONS:     ObesityYes    HypothyroidismNo    HyperlipidemiaYes    CADYes    Cerebrovascular diseaseNo   PVDNo    HTNYes    Sexual dysfunctionNo    DepressionNo     The following portions of the patient's history were reviewed and updated as appropriate: allergies, current medications, past family history, past medical history, past social history, past surgical history and problem " list.    Preventative:  Over the past 2 weeks, have you felt down, depressed, or hopeless?No   Over the past 2 weeks, have you felt little interest or pleasure in doing things?No  Clinical depression screening refused by patient.No     On osteoporosis therapy?No     Past Medical Hx:  Past Medical History:   Diagnosis Date   • Anxiety states    • Asthma    • Carotid artery stenosis     DWIGHT 0-15%, LICA 0-15%. LICA stent 5/2014.   • Chronic folliculitis    • Chronic obstructive lung disease    • Coronary arteriosclerosis    • Diabetes mellitus     no retinopathy; A1C 9.1      • Dyslipidemia    • Essential hypertension    • Excoriated eczema     asteosis/keratosis   • GERD (gastroesophageal reflux disease)    • History of colon polyps    • Hypertensive disorder    • Kidney stone    • Mixed hyperlipidemia    • Morbid obesity due to excess calories     BMI 44.5   • Neurologic disorder associated with diabetes mellitus    • Non-healing surgical wound     LLE EVHa   • Peripheral vascular disease    • Sleep apnea    • Tobacco dependence syndrome     1/2ppd      • Type 2 diabetes mellitus    • Vitamin D deficiency        Past Surgical Hx:  Past Surgical History:   Procedure Laterality Date   • CARDIAC CATHETERIZATION  08/09/2013    Severe multivessel CAD with critical lesions noted in the RCA, obtuse marginal two, ramus intermemdious, and LAD as described above. Normal LV systolic function with no wall motion abnormality.    • CARDIAC CATHETERIZATION  05/27/2014     LEFT Carotid Stent    • CAROTID STENT Left 05/27/2014   • COLONOSCOPY  05/02/2016    Normal colon.Diverticulosis in the sigmoid colon.No specimens collected.    • CORONARY ARTERY BYPASS GRAFT  11/15/2013    CABG x 3 with LIMA to LAD and coronary endarterectomy to LAD, SVG to Ramus intermedius branch and SVG to PDA.    • ENDOSCOPY  09/23/2015     Esophageal stricture present.Normal stomach.Normal duodenum.    • ENDOSCOPY  11/16/2015    w/ dilatation - Esophageal  stricture present,Dilatation performed.Normal stomach and duodenum.    • INCISION AND DRAINAGE ABSCESS  01/02/2016    Incision and drainage of right perineal abscess.    • INCISION AND DRAINAGE ABSCESS  02/18/2014    Incision and Drainage of abscess of left lower leg    • OTHER SURGICAL HISTORY  01/25/2016    DESTRUCTION OF BENIGN LESION      • OTHER SURGICAL HISTORY  04/18/2014    ear/neck - L attempted CEA, Neck Dissection        Health Maintenance:  Immunization History   Administered Date(s) Administered   • Flu Vaccine High Dose PF 65YR+ 11/10/2014   • Flu Vaccine Quad PF >36MO 10/19/2017   • Hepatitis B 06/11/2015, 10/20/2015, 05/13/2016   • Pneumococcal Polysaccharide 01/01/2012   • Tdap 11/10/2014     Health Maintenance   Topic Date Due   • MEDICARE ANNUAL WELLNESS  08/24/2016   • DIABETIC EYE EXAM  08/24/2016   • LIPID PANEL  11/09/2017   • HEMOGLOBIN A1C  04/05/2018   • URINE MICROALBUMIN  10/05/2018   • DIABETIC FOOT EXAM  11/29/2018   • MAMMOGRAM  01/04/2019   • PAP SMEAR  01/04/2020   • COLONOSCOPY  05/02/2021   • TDAP/TD VACCINES (2 - Td) 11/10/2024   • PNEUMOCOCCAL VACCINE (19-64 MEDIUM RISK)  Completed   • HEPATITIS C SCREENING  Completed   • INFLUENZA VACCINE  Addressed       Current Meds:    Current Outpatient Prescriptions:   •  albuterol (ACCUNEB) 0.63 MG/3ML nebulizer solution, Take 3 mL by nebulization Every 6 (Six) Hours As Needed for Wheezing., Disp: 20 vial, Rfl: 0  •  albuterol (PROVENTIL HFA;VENTOLIN HFA) 108 (90 Base) MCG/ACT inhaler, Inhale 2 puffs Every 4 (Four) Hours As Needed for Wheezing., Disp: 1 inhaler, Rfl: 0  •  atorvastatin (LIPITOR) 20 MG tablet, Take 1 tablet by mouth Daily., Disp: 30 tablet, Rfl: 3  •  atorvastatin (LIPITOR) 20 MG tablet, TAKE 1 TABLET BY MOUTH DAILY., Disp: 30 tablet, Rfl: 3  •  Blood Glucose Monitoring Suppl ([employer] BLOOD GLUCOSE METER) W/DEVICE kit, 1 each 3 (Three) Times a Day., Disp: 1 kit, Rfl: 3  •  cetirizine (zyrTEC) 10 MG tablet, TAKE 1 TABLET BY  MOUTH DAILY., Disp: 30 tablet, Rfl: 3  •  clobetasol (CLOBEX) 0.05 % lotion, apply to skin bid x 1-2w then prn. generic, Disp: 118 g, Rfl: 3  •  clopidogrel (PLAVIX) 75 MG tablet, TAKE 1 TABLET BY MOUTH DAILY., Disp: 30 tablet, Rfl: 3  •  clopidogrel (PLAVIX) 75 MG tablet, TAKE 1 TABLET BY MOUTH DAILY., Disp: 30 tablet, Rfl: 3  •  cyclobenzaprine (FLEXERIL) 10 MG tablet, Take 1 tablet by mouth 2 (Two) Times a Day., Disp: 60 tablet, Rfl: 2  •  Empagliflozin (JARDIANCE) 10 MG tablet, Take 10 mg by mouth Every Morning., Disp: 30 tablet, Rfl: 3  •  gabapentin (NEURONTIN) 800 MG tablet, Take 1 tablet by mouth 3 (Three) Times a Day., Disp: 90 tablet, Rfl: 0  •  glucose blood test strip, 1 each by Other route 4 (Four) Times a Day. For blood glucose monitoring. Dx: E11.628, Disp: 100 each, Rfl: 12  •  HUMALOG KWIKPEN 100 UNIT/ML solution pen-injector, INJECT 45 UNITS UNDER THE SKIN 3 (THREE) TIMES A DAY BEFORE MEALS OR AS DIRECTED, Disp: 60 mL, Rfl: 11  •  insulin glargine (LANTUS) 100 UNIT/ML injection, Inject 95 Units under the skin Every 12 (Twelve) Hours., Disp: 1 each, Rfl: 11  •  Insulin Lispro (HUMALOG KWIKPEN) 100 UNIT/ML solution pen-injector, Inject 45 Units under the skin 3 (Three) Times a Day Before Meals., Disp: 60 mL, Rfl: 11  •  Insulin Pen Needle (NOVOFINE) 30G X 8 MM misc, As directed 4 times daily, Disp: 100 each, Rfl: 11  •  ipratropium-albuterol (DUO-NEB) 0.5-2.5 mg/mL nebulizer, Take 3 mL by nebulization 4 (Four) Times a Day. ICD10 code J96.01, Disp: 360 mL, Rfl: 0  •  lisinopril (PRINIVIL,ZESTRIL) 10 MG tablet, TAKE 1 TABLET BY MOUTH DAILY., Disp: 30 tablet, Rfl: 3  •  metFORMIN (GLUMETZA) 1000 MG (MOD) 24 hr tablet, Take 1 tablet by mouth 2 (Two) Times a Day With Meals., Disp: 60 tablet, Rfl: 3  •  metoprolol tartrate (LOPRESSOR) 50 MG tablet, Take 1 tablet by mouth 2 (Two) Times a Day., Disp: 60 tablet, Rfl: 3  •  montelukast (SINGULAIR) 10 MG tablet, TAKE 1 TABLET BY MOUTH EVERY NIGHT., Disp: 30  tablet, Rfl: 3  •  naproxen (NAPROSYN) 500 MG tablet, TAKE 1 TABLET BY MOUTH 2 (TWO) TIMES A DAY WITH MEALS., Disp: 60 tablet, Rfl: 3  •  nitroglycerin (NITROSTAT) 0.4 MG SL tablet, Place 1 tablet under the tongue As Needed for Chest Pain. Take no more than 3 doses in 15 minutes., Disp: 30 tablet, Rfl: 3  •  nystatin (MYCOSTATIN) 142632 UNIT/GM powder, Apply  topically 2 (Two) Times a Day., Disp: 15 g, Rfl: 1  •  pantoprazole (PROTONIX) 40 MG EC tablet, Take 1 tablet by mouth Daily., Disp: 30 tablet, Rfl: 3  •  potassium chloride (K-DUR) 10 MEQ CR tablet, TAKE 1 TABLET BY MOUTH EVERY NIGHT., Disp: 30 tablet, Rfl: 3  •  promethazine (PHENERGAN) 25 MG tablet, Take 1 tablet by mouth Every 6 (Six) Hours As Needed for Nausea or Vomiting., Disp: 30 tablet, Rfl: 0  •  SYMBICORT 160-4.5 MCG/ACT inhaler, INHALE 2 PUFFS INTO LUNGS 2 (TWO) TIMES A DAY-RINSE MOUTH AFTER USE, Disp: 10.2 g, Rfl: 6  •  VICTOZA 18 MG/3ML solution pen-injector injection, INJECT 1.2 MG UNDER THE SKIN EVERY MORNING., Disp: 9 mL, Rfl: 0    Allergies:  Other    Family Hx:  Family History   Problem Relation Age of Onset   • Coronary artery disease Other    • Diabetes Other    • Heart disease Other    • Hypertension Other         Social History:  Social History     Social History   • Marital status:      Spouse name: N/A   • Number of children: N/A   • Years of education: N/A     Occupational History   • Not on file.     Social History Main Topics   • Smoking status: Current Every Day Smoker     Packs/day: 0.25     Types: Cigarettes   • Smokeless tobacco: Never Used   • Alcohol use No   • Drug use: No   • Sexual activity: Not on file     Other Topics Concern   • Not on file     Social History Narrative       Review of Systems  Review of Systems   Constitutional: Negative for activity change, appetite change, diaphoresis, fever and unexpected weight change.   HENT: Negative for congestion, facial swelling, postnasal drip, rhinorrhea, sinus pressure,  "sneezing and sore throat.    Eyes: Negative for photophobia and visual disturbance.   Respiratory: Positive for cough. Negative for chest tightness, shortness of breath and wheezing.    Cardiovascular: Negative for chest pain and palpitations.   Gastrointestinal: Negative for abdominal pain, constipation, diarrhea, nausea and vomiting.   Endocrine: Positive for polyphagia and polyuria.   Genitourinary: Negative for decreased urine volume, difficulty urinating, dysuria and urgency.   Musculoskeletal: Negative for arthralgias, gait problem, joint swelling and neck stiffness.   Skin: Negative for color change and rash.   Neurological: Positive for numbness. Negative for seizures, syncope, speech difficulty, weakness, light-headedness and headaches.   Psychiatric/Behavioral: Negative for decreased concentration, dysphoric mood, sleep disturbance and suicidal ideas. The patient is not nervous/anxious.        Objective:     /80  Pulse 95  Ht 157.5 cm (62\")  Wt 124 kg (273 lb)  LMP  (LMP Unknown)  SpO2 96%  BMI 49.93 kg/m2    Physical Exam   Constitutional: She is oriented to person, place, and time and well-developed, well-nourished, and in no distress. No distress.   Obese   HENT:   Head: Normocephalic and atraumatic.   Nose: Nose normal.   Mouth/Throat: Oropharynx is clear and moist. No oropharyngeal exudate.   Eyes: Conjunctivae and EOM are normal.   Neck: Normal range of motion. Neck supple. No tracheal deviation present.   Cardiovascular: Normal rate, regular rhythm, normal heart sounds and intact distal pulses.    Pulmonary/Chest: Effort normal. No respiratory distress. She exhibits no tenderness.   Abdominal: Soft. Bowel sounds are normal. She exhibits no distension. There is no tenderness.   Musculoskeletal: Normal range of motion.   Neurological: She is alert and oriented to person, place, and time. Gait normal.   Skin: Skin is warm. She is not diaphoretic.   Psychiatric: Mood, memory, affect and " judgment normal.       Lab Review  Glucose (mg/dL)   Date Value   08/26/2017 262 (H)   04/18/2017 133 (H)   04/17/2017 188 (H)     Glucose, Arterial (mmol/L)   Date Value   08/26/2017 276   04/16/2017 230     Sodium (mmol/L)   Date Value   08/26/2017 137   04/18/2017 140   04/17/2017 137     Sodium, Arterial (mmol/L)   Date Value   08/26/2017 138.1     Potassium (mmol/L)   Date Value   08/26/2017 4.2   04/18/2017 4.4   04/17/2017 4.3     Chloride (mmol/L)   Date Value   08/26/2017 102   04/18/2017 108   04/17/2017 106     CO2 (mmol/L)   Date Value   08/26/2017 24.0   04/18/2017 24.0   04/17/2017 20.0 (L)     BUN (mg/dL)   Date Value   08/26/2017 45 (H)   04/18/2017 16   04/17/2017 26 (H)     Creatinine (mg/dL)   Date Value   08/26/2017 1.01 (H)   04/18/2017 0.86   04/17/2017 1.07 (H)     Hemoglobin A1C (%)   Date Value   10/05/2017 11.7 (H)   07/10/2017 12.92 (H)   04/16/2017 12.86 (H)     Triglycerides (mg/dl)   Date Value   11/09/2016 >525 (H)   08/10/2016 310 (H)   03/27/2015 295 (H)       none     Assessment:     Yamileth was seen today for diabetes and med refill.    Diagnoses and all orders for this visit:    Other iron deficiency anemia  -     ferrous sulfate (FERROUSUL) 325 (65 FE) MG tablet; Take 1 tablet by mouth Daily With Breakfast.    Diabetes mellitus type 2 in obese  -     lisinopril (PRINIVIL,ZESTRIL) 10 MG tablet; Take 1 tablet by mouth Daily.  -     gabapentin (NEURONTIN) 800 MG tablet; Take 1 tablet by mouth 3 (Three) Times a Day. For neuropathy    Uncontrolled type 2 diabetes mellitus with complication, with long-term current use of insulin  -     metFORMIN (GLUMETZA) 1000 MG (MOD) 24 hr tablet; Take 1 tablet by mouth 2 (Two) Times a Day With Meals.    Other orders  -     promethazine (PHENERGAN) 25 MG tablet; Take 1 tablet by mouth Every 6 (Six) Hours As Needed for Nausea or Vomiting.  -     cyclobenzaprine (FLEXERIL) 10 MG tablet; Take 1 tablet by mouth 2 (Two) Times a Day As Needed for Muscle  Spasms.  -     clopidogrel (PLAVIX) 75 MG tablet; Take 1 tablet by mouth Daily.  -     potassium chloride (K-DUR) 10 MEQ CR tablet; Take 1 tablet by mouth Every Night.  -     nystatin (MYCOSTATIN) 190303 UNIT/GM powder; Apply  topically 3 (Three) Times a Day.  -     clobetasol (CLOBEX) 0.05 % lotion; apply to skin bid x 1-2w then prn. generic       JULIAN: [id number]  Plan:     1.  Rx changes: none  2.  Education: Reviewed ‘ABCs’ of diabetes management (respective goals in parentheses):  A1C (<7), preprandial glucose (70 mg/dl - 130 mg/dl), postprandial glucose (< 180 mg/dl), blood pressure (<130/80), and cholesterol (LDL <100 mg/dl; HDL > 50 mg/dl; Trig < 150 mg/dl).  3.  Issues reviewed with Yamileth Slater: low cholesterol diet, weight control and daily exercise discussed, home glucose monitoring emphasized, foot care discussed and Podiatry visits discussed, annual eye examinations at Ophthalmology discussed, long term diabetic complications discussed, patient urged in the strongest terms to quit smoking and follow ADA diet.  4.  Compliance at present is estimated to be fair. Efforts to improve compliance (if necessary) will be directed at dietary modifications: ADA diet, increased exercise and regular blood sugar monitoring: 3 times daily.    Return in about 3 months (around 5/2/2018).     Goals     • Fasting Blood Glucose             Barriers: compliance with diet              Preventative:  Female Preventative:  Colon cancer screening is up to date   Recommended:none       Smoking cessation counseling was provided.  does not drink  eat more fruits and vegetables, decrease soda or juice intake, increase water intake, increase physical activity, reduce portion size, cut out extra servings, reduce fast food intake and have 3 meals a day    RISK SCORE: 4     Rosana Delarosa MD PGY3  Family Practice Residency  [place] [id number]  Office:  [phone]    This document has been electronically signed by Yadira Delarosa MD on February 2, 2018 2:27 PM

## 2018-02-06 ENCOUNTER — TELEPHONE (OUTPATIENT)
Dept: FAMILY MEDICINE CLINIC | Facility: CLINIC | Age: 58
End: 2018-02-06

## 2018-02-06 ENCOUNTER — OFFICE VISIT (OUTPATIENT)
Dept: FAMILY MEDICINE CLINIC | Facility: CLINIC | Age: 58
End: 2018-02-06

## 2018-02-06 ENCOUNTER — APPOINTMENT (OUTPATIENT)
Dept: LAB | Facility: HOSPITAL | Age: 58
End: 2018-02-06

## 2018-02-06 VITALS
WEIGHT: 270 LBS | SYSTOLIC BLOOD PRESSURE: 130 MMHG | DIASTOLIC BLOOD PRESSURE: 88 MMHG | OXYGEN SATURATION: 96 % | BODY MASS INDEX: 49.69 KG/M2 | HEART RATE: 103 BPM | HEIGHT: 62 IN

## 2018-02-06 DIAGNOSIS — R30.0 DYSURIA: Primary | ICD-10-CM

## 2018-02-06 DIAGNOSIS — N30.91 CYSTITIS WITH HEMATURIA: ICD-10-CM

## 2018-02-06 LAB
BILIRUB BLD-MCNC: NEGATIVE MG/DL
CLARITY, POC: CLEAR
COLOR UR: YELLOW
GLUCOSE UR STRIP-MCNC: ABNORMAL MG/DL
KETONES UR QL: ABNORMAL
LEUKOCYTE EST, POC: ABNORMAL
NITRITE UR-MCNC: NEGATIVE MG/ML
PH UR: 5 [PH] (ref 5–8)
PROT UR STRIP-MCNC: ABNORMAL MG/DL
RBC # UR STRIP: ABNORMAL /UL
SP GR UR: 1.01 (ref 1–1.03)
UROBILINOGEN UR QL: NORMAL

## 2018-02-06 PROCEDURE — 81002 URINALYSIS NONAUTO W/O SCOPE: CPT | Performed by: FAMILY MEDICINE

## 2018-02-06 PROCEDURE — 87086 URINE CULTURE/COLONY COUNT: CPT | Performed by: FAMILY MEDICINE

## 2018-02-06 PROCEDURE — 99213 OFFICE O/P EST LOW 20 MIN: CPT | Performed by: FAMILY MEDICINE

## 2018-02-06 RX ORDER — LEVOFLOXACIN 500 MG/1
500 TABLET, FILM COATED ORAL DAILY
Qty: 10 TABLET | Refills: 0 | Status: SHIPPED | OUTPATIENT
Start: 2018-02-06 | End: 2018-04-04

## 2018-02-06 NOTE — TELEPHONE ENCOUNTER
Patient called stating she believe she has a UTI.  She reports increased frequency/urgency and difficulty with urination.  She was requesting an antibiotic.    Counseled patient that she needs to be seen to give a urine sample for us to appropriately treat her.    Instructed patient that she could come to walk in clinic or see me today at 1:45pm or 3pm.    Patient voiced understanding.          This document has been electronically signed by Yadira Delarosa MD on February 6, 2018 9:09 AM

## 2018-02-06 NOTE — PROGRESS NOTES
Subjective:     Yamileth Slater is a 57 y.o. female who presents for evaluation of acute illness.    Chief Complaint   Patient presents with   • Urinary Tract Infection     possible       HPI Comments: Patient complains of sudden onset of urinary urgency and frequency.  States her symptoms started this morning, she has been running to the bathroom every few minutes, she complains of some dysuria at the end of her urinary stream.  Denies fever, chills or flank pain.       The following portions of the patient's history were reviewed and updated as appropriate: allergies, current medications, past family history, past medical history, past social history, past surgical history and problem list.    Past Medical History:   Diagnosis Date   • Anxiety states    • Asthma    • Carotid artery stenosis     DWIGHT 0-15%, LICA 0-15%. LICA stent 5/2014.   • Chronic folliculitis    • Chronic obstructive lung disease    • Coronary arteriosclerosis    • Diabetes mellitus     no retinopathy; A1C 9.1      • Dyslipidemia    • Essential hypertension    • Excoriated eczema     asteosis/keratosis   • GERD (gastroesophageal reflux disease)    • History of colon polyps    • Hypertensive disorder    • Kidney stone    • Mixed hyperlipidemia    • Morbid obesity due to excess calories     BMI 44.5   • Neurologic disorder associated with diabetes mellitus    • Non-healing surgical wound     LLE EVHa   • Peripheral vascular disease    • Sleep apnea    • Tobacco dependence syndrome     1/2ppd      • Type 2 diabetes mellitus    • Vitamin D deficiency        Past Surgical History:   Procedure Laterality Date   • CARDIAC CATHETERIZATION  08/09/2013    Severe multivessel CAD with critical lesions noted in the RCA, obtuse marginal two, ramus intermemdious, and LAD as described above. Normal LV systolic function with no wall motion abnormality.    • CARDIAC CATHETERIZATION  05/27/2014     LEFT Carotid Stent    • CAROTID STENT Left 05/27/2014   •  COLONOSCOPY  05/02/2016    Normal colon.Diverticulosis in the sigmoid colon.No specimens collected.    • CORONARY ARTERY BYPASS GRAFT  11/15/2013    CABG x 3 with LIMA to LAD and coronary endarterectomy to LAD, SVG to Ramus intermedius branch and SVG to PDA.    • ENDOSCOPY  09/23/2015     Esophageal stricture present.Normal stomach.Normal duodenum.    • ENDOSCOPY  11/16/2015    w/ dilatation - Esophageal stricture present,Dilatation performed.Normal stomach and duodenum.    • INCISION AND DRAINAGE ABSCESS  01/02/2016    Incision and drainage of right perineal abscess.    • INCISION AND DRAINAGE ABSCESS  02/18/2014    Incision and Drainage of abscess of left lower leg    • OTHER SURGICAL HISTORY  01/25/2016    DESTRUCTION OF BENIGN LESION      • OTHER SURGICAL HISTORY  04/18/2014    ear/neck - L attempted CEA, Neck Dissection        Family History   Problem Relation Age of Onset   • Coronary artery disease Other    • Diabetes Other    • Heart disease Other    • Hypertension Other          Current Outpatient Prescriptions:   •  albuterol (ACCUNEB) 0.63 MG/3ML nebulizer solution, Take 3 mL by nebulization Every 6 (Six) Hours As Needed for Wheezing., Disp: 20 vial, Rfl: 0  •  albuterol (PROVENTIL HFA;VENTOLIN HFA) 108 (90 Base) MCG/ACT inhaler, Inhale 2 puffs Every 4 (Four) Hours As Needed for Wheezing., Disp: 1 inhaler, Rfl: 0  •  atorvastatin (LIPITOR) 20 MG tablet, Take 1 tablet by mouth Daily., Disp: 30 tablet, Rfl: 3  •  atorvastatin (LIPITOR) 20 MG tablet, TAKE 1 TABLET BY MOUTH DAILY., Disp: 30 tablet, Rfl: 3  •  Blood Glucose Monitoring Suppl ([employer] BLOOD GLUCOSE METER) W/DEVICE kit, 1 each 3 (Three) Times a Day., Disp: 1 kit, Rfl: 3  •  cetirizine (zyrTEC) 10 MG tablet, TAKE 1 TABLET BY MOUTH DAILY., Disp: 30 tablet, Rfl: 3  •  clobetasol (CLOBEX) 0.05 % lotion, apply to skin bid x 1-2w then prn. generic, Disp: 118 mL, Rfl: 0  •  clopidogrel (PLAVIX) 75 MG tablet, Take 1 tablet by mouth Daily., Disp: 30 tablet,  Rfl: 3  •  cyclobenzaprine (FLEXERIL) 10 MG tablet, Take 1 tablet by mouth 2 (Two) Times a Day As Needed for Muscle Spasms., Disp: 60 tablet, Rfl: 2  •  Empagliflozin (JARDIANCE) 10 MG tablet, Take 10 mg by mouth Every Morning., Disp: 30 tablet, Rfl: 3  •  ferrous sulfate (FERROUSUL) 325 (65 FE) MG tablet, Take 1 tablet by mouth Daily With Breakfast., Disp: 30 tablet, Rfl: 3  •  gabapentin (NEURONTIN) 800 MG tablet, Take 1 tablet by mouth 3 (Three) Times a Day. For neuropathy, Disp: 90 tablet, Rfl: 2  •  glucose blood test strip, 1 each by Other route 4 (Four) Times a Day. For blood glucose monitoring. Dx: E11.628, Disp: 100 each, Rfl: 12  •  HUMALOG KWIKPEN 100 UNIT/ML solution pen-injector, INJECT 45 UNITS UNDER THE SKIN 3 (THREE) TIMES A DAY BEFORE MEALS OR AS DIRECTED, Disp: 60 mL, Rfl: 11  •  insulin glargine (LANTUS) 100 UNIT/ML injection, Inject 95 Units under the skin Every 12 (Twelve) Hours., Disp: 1 each, Rfl: 11  •  Insulin Lispro (HUMALOG KWIKPEN) 100 UNIT/ML solution pen-injector, Inject 45 Units under the skin 3 (Three) Times a Day Before Meals., Disp: 60 mL, Rfl: 11  •  Insulin Pen Needle (NOVOFINE) 30G X 8 MM misc, As directed 4 times daily, Disp: 100 each, Rfl: 11  •  ipratropium-albuterol (DUO-NEB) 0.5-2.5 mg/mL nebulizer, Take 3 mL by nebulization 4 (Four) Times a Day. ICD10 code J96.01, Disp: 360 mL, Rfl: 0  •  lisinopril (PRINIVIL,ZESTRIL) 10 MG tablet, Take 1 tablet by mouth Daily., Disp: 30 tablet, Rfl: 3  •  metFORMIN (GLUMETZA) 1000 MG (MOD) 24 hr tablet, Take 1 tablet by mouth 2 (Two) Times a Day With Meals., Disp: 60 tablet, Rfl: 3  •  metoprolol tartrate (LOPRESSOR) 50 MG tablet, Take 1 tablet by mouth 2 (Two) Times a Day., Disp: 60 tablet, Rfl: 3  •  montelukast (SINGULAIR) 10 MG tablet, TAKE 1 TABLET BY MOUTH EVERY NIGHT., Disp: 30 tablet, Rfl: 3  •  naproxen (NAPROSYN) 500 MG tablet, TAKE 1 TABLET BY MOUTH 2 (TWO) TIMES A DAY WITH MEALS., Disp: 60 tablet, Rfl: 3  •  nitroglycerin  (NITROSTAT) 0.4 MG SL tablet, Place 1 tablet under the tongue As Needed for Chest Pain. Take no more than 3 doses in 15 minutes., Disp: 30 tablet, Rfl: 3  •  nystatin (MYCOSTATIN) 686731 UNIT/GM powder, Apply  topically 3 (Three) Times a Day., Disp: 15 g, Rfl: 1  •  pantoprazole (PROTONIX) 40 MG EC tablet, Take 1 tablet by mouth Daily., Disp: 30 tablet, Rfl: 3  •  potassium chloride (K-DUR) 10 MEQ CR tablet, Take 1 tablet by mouth Every Night., Disp: 30 tablet, Rfl: 3  •  promethazine (PHENERGAN) 25 MG tablet, Take 1 tablet by mouth Every 6 (Six) Hours As Needed for Nausea or Vomiting., Disp: 30 tablet, Rfl: 0  •  SYMBICORT 160-4.5 MCG/ACT inhaler, INHALE 2 PUFFS INTO LUNGS 2 (TWO) TIMES A DAY-RINSE MOUTH AFTER USE, Disp: 10.2 g, Rfl: 6  •  VICTOZA 18 MG/3ML solution pen-injector injection, INJECT 1.2 MG UNDER THE SKIN EVERY MORNING., Disp: 9 mL, Rfl: 0    Allergies   Allergen Reactions   • Other      Bandaids, MRSA, SURECLOSE       Social History     Social History   • Marital status:      Spouse name: N/A   • Number of children: N/A   • Years of education: N/A     Occupational History   • Not on file.     Social History Main Topics   • Smoking status: Current Every Day Smoker     Packs/day: 0.25     Types: Cigarettes   • Smokeless tobacco: Never Used   • Alcohol use No   • Drug use: No   • Sexual activity: Not on file     Other Topics Concern   • Not on file     Social History Narrative       Review of Systems   Constitutional: Negative for activity change, appetite change, chills and fever.   HENT: Negative for congestion and sore throat.    Eyes: Negative for photophobia and visual disturbance.   Respiratory: Negative for cough, shortness of breath and wheezing.    Gastrointestinal: Negative for abdominal pain, constipation, diarrhea, nausea and vomiting.   Genitourinary: Positive for difficulty urinating, dysuria and urgency.   Musculoskeletal: Negative for arthralgias and myalgias.   Skin: Negative for  "color change and rash.   Neurological: Negative for dizziness, weakness and light-headedness.   Psychiatric/Behavioral: Negative for confusion and decreased concentration.       Objective:     /88  Pulse 103  Ht 157.5 cm (62\")  Wt 122 kg (270 lb)  LMP  (LMP Unknown)  SpO2 96%  BMI 49.38 kg/m2    Physical Exam   Constitutional: She is oriented to person, place, and time. She appears well-developed and well-nourished.   HENT:   Head: Normocephalic and atraumatic.   Nose: Nose normal.   Eyes: Conjunctivae and EOM are normal. Pupils are equal, round, and reactive to light.   Neck: Normal range of motion. Neck supple.   Cardiovascular: Normal rate, regular rhythm, normal heart sounds and intact distal pulses.    Pulmonary/Chest: Effort normal and breath sounds normal. No respiratory distress. She has no wheezes. She has no rales.   Abdominal: Soft. Bowel sounds are normal. She exhibits no distension. There is no tenderness. There is no CVA tenderness.   Musculoskeletal: Normal range of motion.   Neurological: She is alert and oriented to person, place, and time.   Skin: Skin is warm and dry.   Vitals reviewed.      Assessment/Plan:     Yamileth was seen today for urinary tract infection.    Diagnoses and all orders for this visit:    Dysuria  -     POC Urinalysis Dipstick  -     Urine Culture - Urine, Urine, Clean Catch    Cystitis with hematuria  -     levoFLOXacin (LEVAQUIN) 500 MG tablet; Take 1 tablet by mouth Daily.      Brief Urine Lab Results  (Last result in the past 365 days)      Color   Clarity   Blood   Leuk Est   Nitrite   Protein   CREAT   Urine HCG        02/06/18 1412 Yellow Clear Large(A) Large (3+)(A) Negative 300 mg/dL(A)             Return for Next scheduled follow up.    Goals     • Fasting Blood Glucose             Barriers: compliance with diet            Preventative:   Female Preventative: colonoscopy utd  Recommended Vaccines: none    Smoking cessation counseling was " provided.  does not drink  eat more fruits and vegetables, increase physical activity and have 3 meals a day    RISK SCORE: 4        Signature  Yadira Delarosa MD PGY3  [facility] [place] [address]  Office: [phone]    This document has been electronically signed by Yadira Delarosa MD on February 6, 2018 2:20 PM

## 2018-02-08 LAB
BACTERIA SPEC AEROBE CULT: NORMAL
BACTERIA SPEC AEROBE CULT: NORMAL

## 2018-02-14 DIAGNOSIS — IMO0002 UNCONTROLLED TYPE 2 DIABETES MELLITUS WITH COMPLICATION, WITH LONG-TERM CURRENT USE OF INSULIN: ICD-10-CM

## 2018-02-16 RX ORDER — LIRAGLUTIDE 6 MG/ML
INJECTION SUBCUTANEOUS
Qty: 9 ML | Refills: 3 | Status: SHIPPED | OUTPATIENT
Start: 2018-02-16 | End: 2018-04-05 | Stop reason: SDUPTHER

## 2018-03-01 DIAGNOSIS — R52 PAIN: ICD-10-CM

## 2018-03-02 RX ORDER — CETIRIZINE HYDROCHLORIDE 10 MG/1
TABLET ORAL
Qty: 30 TABLET | Refills: 3 | Status: SHIPPED | OUTPATIENT
Start: 2018-03-02 | End: 2018-09-04 | Stop reason: SDUPTHER

## 2018-03-02 RX ORDER — METFORMIN HYDROCHLORIDE 500 MG/1
TABLET, EXTENDED RELEASE ORAL
Qty: 120 TABLET | Refills: 0 | Status: SHIPPED | OUTPATIENT
Start: 2018-03-02 | End: 2018-04-05 | Stop reason: SDUPTHER

## 2018-03-02 RX ORDER — NAPROXEN 500 MG/1
TABLET ORAL
Qty: 60 TABLET | Refills: 3 | Status: SHIPPED | OUTPATIENT
Start: 2018-03-02 | End: 2018-04-05 | Stop reason: SDUPTHER

## 2018-03-02 RX ORDER — FUROSEMIDE 80 MG
TABLET ORAL
Qty: 15 TABLET | Refills: 3 | Status: SHIPPED | OUTPATIENT
Start: 2018-03-02 | End: 2018-09-04

## 2018-04-02 RX ORDER — PANTOPRAZOLE SODIUM 40 MG/1
40 TABLET, DELAYED RELEASE ORAL DAILY
Qty: 30 TABLET | Refills: 3 | Status: SHIPPED | OUTPATIENT
Start: 2018-04-02 | End: 2018-04-05

## 2018-04-04 ENCOUNTER — APPOINTMENT (OUTPATIENT)
Dept: LAB | Facility: HOSPITAL | Age: 58
End: 2018-04-04

## 2018-04-04 ENCOUNTER — OFFICE VISIT (OUTPATIENT)
Dept: FAMILY MEDICINE CLINIC | Facility: CLINIC | Age: 58
End: 2018-04-04

## 2018-04-04 VITALS
HEIGHT: 62 IN | BODY MASS INDEX: 51.89 KG/M2 | HEART RATE: 85 BPM | SYSTOLIC BLOOD PRESSURE: 120 MMHG | WEIGHT: 282 LBS | DIASTOLIC BLOOD PRESSURE: 66 MMHG | OXYGEN SATURATION: 94 %

## 2018-04-04 DIAGNOSIS — IMO0002 UNCONTROLLED TYPE 2 DIABETES MELLITUS WITH DIABETIC POLYNEUROPATHY, WITH LONG-TERM CURRENT USE OF INSULIN: Primary | ICD-10-CM

## 2018-04-04 DIAGNOSIS — E78.2 MIXED HYPERLIPIDEMIA: ICD-10-CM

## 2018-04-04 LAB
ARTICHOKE IGE QN: 47 MG/DL (ref 1–129)
CHOLEST SERPL-MCNC: 121 MG/DL (ref 0–199)
HBA1C MFR BLD: 6.1 % (ref 4–5.6)
HDLC SERPL-MCNC: 35 MG/DL (ref 60–200)
LDLC/HDLC SERPL: 1.3 {RATIO} (ref 0–3.22)
TRIGL SERPL-MCNC: 203 MG/DL (ref 20–199)

## 2018-04-04 PROCEDURE — 80061 LIPID PANEL: CPT | Performed by: FAMILY MEDICINE

## 2018-04-04 PROCEDURE — 36415 COLL VENOUS BLD VENIPUNCTURE: CPT | Performed by: FAMILY MEDICINE

## 2018-04-04 PROCEDURE — 99213 OFFICE O/P EST LOW 20 MIN: CPT | Performed by: FAMILY MEDICINE

## 2018-04-04 PROCEDURE — 83036 HEMOGLOBIN GLYCOSYLATED A1C: CPT | Performed by: FAMILY MEDICINE

## 2018-04-04 RX ORDER — GABAPENTIN 800 MG/1
800 TABLET ORAL 3 TIMES DAILY
Qty: 90 TABLET | Refills: 3 | Status: SHIPPED | OUTPATIENT
Start: 2018-04-04 | End: 2018-09-04 | Stop reason: SDUPTHER

## 2018-04-04 NOTE — PROGRESS NOTES
I have reviewed the notes, assessments, and/or procedures performed by Yadira Delarosa MD , I concur with her/his documentation and assessment and plan for Yamileth Slater.        This document has been electronically signed by Pauline Martinez MD on April 4, 2018 3:42 PM

## 2018-04-04 NOTE — PROGRESS NOTES
"  Subjective:     Yamileth Slater is a 58 y.o. female who presents follow up of uncontrolled DM2 with polyneuropathy.      Diabetes HPI:  Patient is tolerating Jardiance and Victoza well without side effects, she is wanting to know how well her A1C is now.    The patient was initially diagnosed with Type 2 diabetes mellitus based on the following criteria:  Elevated A1C and random blood glucose.    Known diabetic complications: peripheral neuropathy and cardiovascular disease  Cardiovascular risk factors: diabetes mellitus, dyslipidemia, hypertension, obesity (BMI >= 30 kg/m2), sedentary lifestyle and smoking/ tobacco exposure  Current diabetic medications include Metformin 1000 mg BID; Lantus 95units qhs/qAM; Humalog 45-60units sliding scale with meals; Jardiance 10mg daily; Victoza.     Eye exam current (within one year): no - would like to be referred to Dr. New in [place].  Weight trend: increasing steadily  Prior visit with dietician: yes - in hospital  Current diet: in general, an \"unhealthy\" diet, but has been avoiding [employer] as recommended; not limiting salt  Current exercise: none    Current monitoring regimen: home blood tests - 2-3 times daily  Home blood sugar records: Fasting 100s -140s, 2hr postprandial: 130s-150ss PM: 140s-170s    Any episodes of hypoglycemia? no     ACE inhibitor or angiotensin II receptor blocker?     Yes     lisinopril (generic)        Statin?     Yes    ASA?     Yes     Hemoglobin A1C (%)   Date Value   10/05/2017 11.7 (H)   07/10/2017 12.92 (H)   04/16/2017 12.86 (H)       COMORBID CONDITIONS:     ObesityYes    HypothyroidismNo    HyperlipidemiaYes    CADYes    Cerebrovascular diseaseNo   PVDNo    HTNYes    Sexual dysfunctionNo    DepressionNo     The following portions of the patient's history were reviewed and updated as appropriate: allergies, current medications, past family history, past medical history, past social history, past surgical history and " problem list.    Preventative:  Over the past 2 weeks, have you felt down, depressed, or hopeless?No   Over the past 2 weeks, have you felt little interest or pleasure in doing things?No  Clinical depression screening refused by patient.No     On osteoporosis therapy?No     Past Medical Hx:  Past Medical History:   Diagnosis Date   • Anxiety states    • Asthma    • Carotid artery stenosis     DWIGHT 0-15%, LICA 0-15%. LICA stent 5/2014.   • Chronic folliculitis    • Chronic obstructive lung disease    • Coronary arteriosclerosis    • Diabetes mellitus     no retinopathy; A1C 9.1      • Dyslipidemia    • Essential hypertension    • Excoriated eczema     asteosis/keratosis   • GERD (gastroesophageal reflux disease)    • History of colon polyps    • Hypertensive disorder    • Kidney stone    • Mixed hyperlipidemia    • Morbid obesity due to excess calories     BMI 44.5   • Neurologic disorder associated with diabetes mellitus    • Non-healing surgical wound     LLE EVHa   • Peripheral vascular disease    • Sleep apnea    • Tobacco dependence syndrome     1/2ppd      • Type 2 diabetes mellitus    • Vitamin D deficiency        Past Surgical Hx:  Past Surgical History:   Procedure Laterality Date   • CARDIAC CATHETERIZATION  08/09/2013    Severe multivessel CAD with critical lesions noted in the RCA, obtuse marginal two, ramus intermemdious, and LAD as described above. Normal LV systolic function with no wall motion abnormality.    • CARDIAC CATHETERIZATION  05/27/2014     LEFT Carotid Stent    • CAROTID STENT Left 05/27/2014   • COLONOSCOPY  05/02/2016    Normal colon.Diverticulosis in the sigmoid colon.No specimens collected.    • CORONARY ARTERY BYPASS GRAFT  11/15/2013    CABG x 3 with LIMA to LAD and coronary endarterectomy to LAD, SVG to Ramus intermedius branch and SVG to PDA.    • ENDOSCOPY  09/23/2015     Esophageal stricture present.Normal stomach.Normal duodenum.    • ENDOSCOPY  11/16/2015    w/ dilatation -  Esophageal stricture present,Dilatation performed.Normal stomach and duodenum.    • INCISION AND DRAINAGE ABSCESS  01/02/2016    Incision and drainage of right perineal abscess.    • INCISION AND DRAINAGE ABSCESS  02/18/2014    Incision and Drainage of abscess of left lower leg    • OTHER SURGICAL HISTORY  01/25/2016    DESTRUCTION OF BENIGN LESION      • OTHER SURGICAL HISTORY  04/18/2014    ear/neck - L attempted CEA, Neck Dissection        Health Maintenance:  Immunization History   Administered Date(s) Administered   • Flu Vaccine High Dose PF 65YR+ 11/10/2014   • Flu Vaccine Quad PF >36MO 10/19/2017   • Hepatitis B 06/11/2015, 10/20/2015, 05/13/2016   • Pneumococcal Polysaccharide (PPSV23) 01/01/2012   • Tdap 11/10/2014     Health Maintenance   Topic Date Due   • DIABETIC EYE EXAM  1960   • MEDICARE ANNUAL WELLNESS  08/24/2016   • LIPID PANEL  11/09/2017   • HEMOGLOBIN A1C  04/05/2018   • URINE MICROALBUMIN  10/05/2018   • DIABETIC FOOT EXAM  11/29/2018   • MAMMOGRAM  01/04/2019   • PAP SMEAR  01/04/2020   • COLONOSCOPY  05/02/2021   • TDAP/TD VACCINES (2 - Td) 11/10/2024   • PNEUMOCOCCAL VACCINE (19-64 MEDIUM RISK)  Completed   • HEPATITIS C SCREENING  Completed   • INFLUENZA VACCINE  Addressed       Current Meds:    Current Outpatient Prescriptions:   •  albuterol (ACCUNEB) 0.63 MG/3ML nebulizer solution, Take 3 mL by nebulization Every 6 (Six) Hours As Needed for Wheezing., Disp: 20 vial, Rfl: 0  •  albuterol (PROVENTIL HFA;VENTOLIN HFA) 108 (90 Base) MCG/ACT inhaler, Inhale 2 puffs Every 4 (Four) Hours As Needed for Wheezing., Disp: 1 inhaler, Rfl: 0  •  atorvastatin (LIPITOR) 20 MG tablet, Take 1 tablet by mouth Daily., Disp: 30 tablet, Rfl: 3  •  atorvastatin (LIPITOR) 20 MG tablet, TAKE 1 TABLET BY MOUTH DAILY., Disp: 30 tablet, Rfl: 3  •  Blood Glucose Monitoring Suppl ([employer] BLOOD GLUCOSE METER) W/DEVICE kit, 1 each 3 (Three) Times a Day., Disp: 1 kit, Rfl: 3  •  cetirizine (zyrTEC) 10 MG tablet,  TAKE 1 TABLET BY MOUTH DAILY., Disp: 30 tablet, Rfl: 3  •  clobetasol (CLOBEX) 0.05 % lotion, apply to skin bid x 1-2w then prn. generic, Disp: 118 mL, Rfl: 0  •  clopidogrel (PLAVIX) 75 MG tablet, Take 1 tablet by mouth Daily., Disp: 30 tablet, Rfl: 3  •  cyclobenzaprine (FLEXERIL) 10 MG tablet, Take 1 tablet by mouth 2 (Two) Times a Day As Needed for Muscle Spasms., Disp: 60 tablet, Rfl: 2  •  Empagliflozin (JARDIANCE) 10 MG tablet, Take 10 mg by mouth Every Morning., Disp: 30 tablet, Rfl: 3  •  ferrous sulfate (FERROUSUL) 325 (65 FE) MG tablet, Take 1 tablet by mouth Daily With Breakfast., Disp: 30 tablet, Rfl: 3  •  furosemide (LASIX) 80 MG tablet, TAKE 1/2 TABLET BY MOUTH EVERY DAY, Disp: 15 tablet, Rfl: 3  •  gabapentin (NEURONTIN) 800 MG tablet, Take 1 tablet by mouth 3 (Three) Times a Day. For neuropathy, Disp: 90 tablet, Rfl: 2  •  glucose blood test strip, 1 each by Other route 4 (Four) Times a Day. For blood glucose monitoring. Dx: E11.628, Disp: 100 each, Rfl: 12  •  HUMALOG KWIKPEN 100 UNIT/ML solution pen-injector, INJECT 45 UNITS UNDER THE SKIN 3 (THREE) TIMES A DAY BEFORE MEALS OR AS DIRECTED, Disp: 60 mL, Rfl: 11  •  insulin glargine (LANTUS) 100 UNIT/ML injection, Inject 95 Units under the skin Every 12 (Twelve) Hours., Disp: 1 each, Rfl: 11  •  Insulin Lispro (HUMALOG KWIKPEN) 100 UNIT/ML solution pen-injector, Inject 45 Units under the skin 3 (Three) Times a Day Before Meals., Disp: 60 mL, Rfl: 11  •  Insulin Pen Needle (NOVOFINE) 30G X 8 MM misc, As directed 4 times daily, Disp: 100 each, Rfl: 11  •  ipratropium-albuterol (DUO-NEB) 0.5-2.5 mg/mL nebulizer, Take 3 mL by nebulization 4 (Four) Times a Day. ICD10 code J96.01, Disp: 360 mL, Rfl: 0  •  lisinopril (PRINIVIL,ZESTRIL) 10 MG tablet, Take 1 tablet by mouth Daily., Disp: 30 tablet, Rfl: 3  •  metFORMIN (GLUMETZA) 1000 MG (MOD) 24 hr tablet, Take 1 tablet by mouth 2 (Two) Times a Day With Meals., Disp: 60 tablet, Rfl: 3  •  metFORMIN ER  (GLUCOPHAGE-XR) 500 MG 24 hr tablet, TAKE TWO TABLETS BY MOUTH TWO TIMES A DAY WITH MEALS, Disp: 120 tablet, Rfl: 0  •  metoprolol tartrate (LOPRESSOR) 50 MG tablet, Take 1 tablet by mouth 2 (Two) Times a Day., Disp: 60 tablet, Rfl: 3  •  montelukast (SINGULAIR) 10 MG tablet, TAKE 1 TABLET BY MOUTH EVERY NIGHT., Disp: 30 tablet, Rfl: 3  •  naproxen (NAPROSYN) 500 MG tablet, TAKE 1 TABLET BY MOUTH 2 (TWO) TIMES A DAY WITH MEALS., Disp: 60 tablet, Rfl: 3  •  nitroglycerin (NITROSTAT) 0.4 MG SL tablet, Place 1 tablet under the tongue As Needed for Chest Pain. Take no more than 3 doses in 15 minutes., Disp: 30 tablet, Rfl: 3  •  nystatin (MYCOSTATIN) 208752 UNIT/GM powder, Apply  topically 3 (Three) Times a Day., Disp: 15 g, Rfl: 1  •  pantoprazole (PROTONIX) 40 MG EC tablet, Take 1 tablet by mouth Daily., Disp: 30 tablet, Rfl: 3  •  pantoprazole (PROTONIX) 40 MG EC tablet, TAKE 1 TABLET BY MOUTH DAILY., Disp: 30 tablet, Rfl: 3  •  potassium chloride (K-DUR) 10 MEQ CR tablet, Take 1 tablet by mouth Every Night., Disp: 30 tablet, Rfl: 3  •  promethazine (PHENERGAN) 25 MG tablet, Take 1 tablet by mouth Every 6 (Six) Hours As Needed for Nausea or Vomiting., Disp: 30 tablet, Rfl: 0  •  SYMBICORT 160-4.5 MCG/ACT inhaler, INHALE 2 PUFFS INTO LUNGS 2 (TWO) TIMES A DAY-RINSE MOUTH AFTER USE, Disp: 10.2 g, Rfl: 6  •  VICTOZA 18 MG/3ML solution pen-injector injection, INJECT 1.2 MG UNDER THE SKIN EVERY MORNING., Disp: 9 mL, Rfl: 3    Allergies:  Other    Family Hx:  Family History   Problem Relation Age of Onset   • Coronary artery disease Other    • Diabetes Other    • Heart disease Other    • Hypertension Other         Social History:  Social History     Social History   • Marital status:      Spouse name: N/A   • Number of children: N/A   • Years of education: N/A     Occupational History   • Not on file.     Social History Main Topics   • Smoking status: Current Every Day Smoker     Packs/day: 0.25     Types:  "Cigarettes   • Smokeless tobacco: Never Used   • Alcohol use No   • Drug use: No   • Sexual activity: Not on file     Other Topics Concern   • Not on file     Social History Narrative   • No narrative on file       Review of Systems  Review of Systems   Constitutional: Negative for activity change, appetite change, diaphoresis, fever and unexpected weight change.   HENT: Negative for congestion, facial swelling, postnasal drip, rhinorrhea, sinus pressure, sneezing and sore throat.    Eyes: Negative for photophobia and visual disturbance.   Respiratory: Negative for cough, chest tightness, shortness of breath and wheezing.    Cardiovascular: Negative for chest pain and palpitations.   Gastrointestinal: Negative for abdominal pain, constipation, diarrhea, nausea and vomiting.   Endocrine: Positive for polyphagia and polyuria.   Genitourinary: Negative for decreased urine volume, difficulty urinating, dysuria and urgency.   Musculoskeletal: Negative for arthralgias, gait problem, joint swelling and neck stiffness.   Skin: Negative for color change and rash.   Neurological: Positive for numbness. Negative for seizures, syncope, speech difficulty, weakness, light-headedness and headaches.   Psychiatric/Behavioral: Negative for decreased concentration, dysphoric mood, sleep disturbance and suicidal ideas. The patient is not nervous/anxious.        Objective:     /66 (BP Location: Left arm, Patient Position: Sitting, Cuff Size: Large Adult)   Pulse 85   Ht 157.5 cm (62\")   Wt 128 kg (282 lb)   LMP  (LMP Unknown)   SpO2 94%   BMI 51.58 kg/m²     Physical Exam   Constitutional: She is oriented to person, place, and time and well-developed, well-nourished, and in no distress. No distress.   Obese   HENT:   Head: Normocephalic and atraumatic.   Nose: Nose normal.   Mouth/Throat: Oropharynx is clear and moist. No oropharyngeal exudate.   Eyes: Conjunctivae and EOM are normal.   Neck: Normal range of motion. Neck " supple. No tracheal deviation present.   Cardiovascular: Normal rate, regular rhythm, normal heart sounds and intact distal pulses.    Pulmonary/Chest: Effort normal. No respiratory distress. She has no wheezes. She exhibits no tenderness.   Abdominal: Soft. Bowel sounds are normal. She exhibits no distension. There is no tenderness.   Musculoskeletal: Normal range of motion.   Neurological: She is alert and oriented to person, place, and time. Gait normal.   Skin: Skin is warm. She is not diaphoretic.   Psychiatric: Mood, memory, affect and judgment normal.       Lab Review  Glucose (mg/dL)   Date Value   08/26/2017 262 (H)   04/18/2017 133 (H)   04/17/2017 188 (H)     Glucose, Arterial (mmol/L)   Date Value   08/26/2017 276   04/16/2017 230     Sodium (mmol/L)   Date Value   08/26/2017 137   04/18/2017 140   04/17/2017 137     Sodium, Arterial (mmol/L)   Date Value   08/26/2017 138.1     Potassium (mmol/L)   Date Value   08/26/2017 4.2   04/18/2017 4.4   04/17/2017 4.3     Chloride (mmol/L)   Date Value   08/26/2017 102   04/18/2017 108   04/17/2017 106     CO2 (mmol/L)   Date Value   08/26/2017 24.0   04/18/2017 24.0   04/17/2017 20.0 (L)     BUN (mg/dL)   Date Value   08/26/2017 45 (H)   04/18/2017 16   04/17/2017 26 (H)     Creatinine (mg/dL)   Date Value   08/26/2017 1.01 (H)   04/18/2017 0.86   04/17/2017 1.07 (H)     Hemoglobin A1C (%)   Date Value   10/05/2017 11.7 (H)   07/10/2017 12.92 (H)   04/16/2017 12.86 (H)     Triglycerides (mg/dl)   Date Value   11/09/2016 >525 (H)   08/10/2016 310 (H)   03/27/2015 295 (H)     LDL Cholesterol  (mg/dl)   Date Value   08/10/2016 47   03/27/2015 66       none     Assessment:     Yamileth was seen today for diabetes.    Diagnoses and all orders for this visit:    Uncontrolled type 2 diabetes mellitus with diabetic polyneuropathy, with long-term current use of insulin  -     Hemoglobin A1c  -     gabapentin (NEURONTIN) 800 MG tablet; Take 1 tablet by mouth 3 (Three) Times  a Day. For neuropathy    Mixed hyperlipidemia  -     Lipid Panel         JULIAN: [id number], consistent with patient reported medications.    Plan:     1.  Rx changes: none  2.  Education: Reviewed ‘ABCs’ of diabetes management (respective goals in parentheses):  A1C (<7), preprandial glucose (70 mg/dl - 130 mg/dl), postprandial glucose (< 180 mg/dl), blood pressure (<130/80), and cholesterol (LDL <100 mg/dl; HDL > 50 mg/dl; Trig < 150 mg/dl).  3.  Issues reviewed with Yamileth Slater: low cholesterol diet, weight control and daily exercise discussed, home glucose monitoring emphasized, foot care discussed and Podiatry visits discussed, annual eye examinations at Ophthalmology discussed, long term diabetic complications discussed, patient urged in the strongest terms to quit smoking and follow ADA diet.  4.  Compliance at present is estimated to be fair. Efforts to improve compliance (if necessary) will be directed at dietary modifications: ADA diet, increased exercise and regular blood sugar monitoring: 3 times daily.    Return in about 3 months (around 7/4/2018) for Recheck DM2 with Dr. Carias (blue team intern) - establish care.     Goals     • Fasting Blood Glucose             Barriers: compliance with diet              Preventative:  Female Preventative:  Colon cancer screening is up to date   Recommended:none       Smoking cessation counseling was provided.  does not drink  eat more fruits and vegetables, decrease soda or juice intake, increase water intake, increase physical activity, reduce portion size, cut out extra servings, reduce fast food intake and have 3 meals a day    RISK SCORE: 4     Signature  Yadira Delarosa MD PGY3  [place] [address]  Office: [phone]    This document has been electronically signed by Yadira Delarosa MD on April 4, 2018 3:14 PM

## 2018-04-05 DIAGNOSIS — R52 PAIN: ICD-10-CM

## 2018-04-05 DIAGNOSIS — J44.9 CHRONIC OBSTRUCTIVE PULMONARY DISEASE, UNSPECIFIED COPD TYPE (HCC): ICD-10-CM

## 2018-04-05 DIAGNOSIS — IMO0002 UNCONTROLLED TYPE 2 DIABETES MELLITUS WITH COMPLICATION, WITH LONG-TERM CURRENT USE OF INSULIN: ICD-10-CM

## 2018-04-05 DIAGNOSIS — E66.9 DIABETES MELLITUS TYPE 2 IN OBESE (HCC): ICD-10-CM

## 2018-04-05 DIAGNOSIS — E11.69 DIABETES MELLITUS TYPE 2 IN OBESE (HCC): ICD-10-CM

## 2018-04-05 DIAGNOSIS — D50.8 OTHER IRON DEFICIENCY ANEMIA: ICD-10-CM

## 2018-04-05 DIAGNOSIS — J44.0 CHRONIC OBSTRUCTIVE PULMONARY DISEASE WITH ACUTE LOWER RESPIRATORY INFECTION (HCC): ICD-10-CM

## 2018-04-05 PROBLEM — B96.1 KLEBSIELLA CYSTITIS: Status: RESOLVED | Noted: 2017-04-17 | Resolved: 2018-04-05

## 2018-04-05 PROBLEM — N30.90 KLEBSIELLA CYSTITIS: Status: RESOLVED | Noted: 2017-04-17 | Resolved: 2018-04-05

## 2018-04-05 RX ORDER — BUDESONIDE AND FORMOTEROL FUMARATE DIHYDRATE 160; 4.5 UG/1; UG/1
2 AEROSOL RESPIRATORY (INHALATION) 2 TIMES DAILY
Qty: 6 G | Refills: 0 | Status: SHIPPED | OUTPATIENT
Start: 2018-04-05 | End: 2018-06-28

## 2018-04-05 RX ORDER — FERROUS SULFATE 325(65) MG
325 TABLET ORAL
Qty: 30 TABLET | Refills: 3 | Status: SHIPPED | OUTPATIENT
Start: 2018-04-05 | End: 2018-07-25 | Stop reason: SDUPTHER

## 2018-04-05 RX ORDER — CYCLOBENZAPRINE HCL 10 MG
10 TABLET ORAL 2 TIMES DAILY PRN
Qty: 60 TABLET | Refills: 2 | Status: SHIPPED | OUTPATIENT
Start: 2018-04-05 | End: 2018-07-26 | Stop reason: SDUPTHER

## 2018-04-05 RX ORDER — MONTELUKAST SODIUM 10 MG/1
10 TABLET ORAL NIGHTLY
Qty: 30 TABLET | Refills: 3 | Status: SHIPPED | OUTPATIENT
Start: 2018-04-05 | End: 2018-08-23 | Stop reason: SDUPTHER

## 2018-04-05 RX ORDER — ATORVASTATIN CALCIUM 20 MG/1
20 TABLET, FILM COATED ORAL DAILY
Qty: 30 TABLET | Refills: 3 | Status: SHIPPED | OUTPATIENT
Start: 2018-04-05 | End: 2018-08-23 | Stop reason: SDUPTHER

## 2018-04-05 RX ORDER — ALBUTEROL SULFATE 90 UG/1
2 AEROSOL, METERED RESPIRATORY (INHALATION) EVERY 4 HOURS PRN
Qty: 1 INHALER | Refills: 0 | Status: SHIPPED | OUTPATIENT
Start: 2018-04-05 | End: 2018-09-04 | Stop reason: SDUPTHER

## 2018-04-05 RX ORDER — CLOPIDOGREL BISULFATE 75 MG/1
75 TABLET ORAL DAILY
Qty: 30 TABLET | Refills: 3 | Status: SHIPPED | OUTPATIENT
Start: 2018-04-05 | End: 2018-11-12 | Stop reason: SDUPTHER

## 2018-04-05 RX ORDER — NAPROXEN 500 MG/1
500 TABLET ORAL 2 TIMES DAILY WITH MEALS
Qty: 60 TABLET | Refills: 3 | Status: SHIPPED | OUTPATIENT
Start: 2018-04-05 | End: 2018-08-22

## 2018-04-05 RX ORDER — PANTOPRAZOLE SODIUM 40 MG/1
40 TABLET, DELAYED RELEASE ORAL DAILY
Qty: 30 TABLET | Refills: 3 | Status: SHIPPED | OUTPATIENT
Start: 2018-04-05 | End: 2018-12-05 | Stop reason: SDUPTHER

## 2018-04-05 RX ORDER — INSULIN GLARGINE 100 [IU]/ML
95 INJECTION, SOLUTION SUBCUTANEOUS EVERY 12 HOURS
Qty: 1 EACH | Refills: 11 | Status: SHIPPED | OUTPATIENT
Start: 2018-04-05 | End: 2018-09-10 | Stop reason: SDUPTHER

## 2018-04-05 RX ORDER — METOPROLOL TARTRATE 50 MG/1
50 TABLET, FILM COATED ORAL EVERY 12 HOURS SCHEDULED
Qty: 60 TABLET | Refills: 3 | Status: SHIPPED | OUTPATIENT
Start: 2018-04-05 | End: 2018-09-04 | Stop reason: SDUPTHER

## 2018-04-05 RX ORDER — LISINOPRIL 10 MG/1
10 TABLET ORAL DAILY
Qty: 30 TABLET | Refills: 3 | Status: SHIPPED | OUTPATIENT
Start: 2018-04-05 | End: 2018-11-12 | Stop reason: SDUPTHER

## 2018-04-05 RX ORDER — METFORMIN HYDROCHLORIDE 1000 MG/1
1000 TABLET, FILM COATED, EXTENDED RELEASE ORAL 2 TIMES DAILY WITH MEALS
Qty: 60 TABLET | Refills: 3 | Status: SHIPPED | OUTPATIENT
Start: 2018-04-05 | End: 2018-08-17 | Stop reason: SDUPTHER

## 2018-04-16 ENCOUNTER — TELEPHONE (OUTPATIENT)
Dept: FAMILY MEDICINE CLINIC | Facility: CLINIC | Age: 58
End: 2018-04-16

## 2018-06-22 RX ORDER — POTASSIUM CHLORIDE 750 MG/1
10 TABLET, FILM COATED, EXTENDED RELEASE ORAL NIGHTLY
Qty: 30 TABLET | Refills: 3 | Status: SHIPPED | OUTPATIENT
Start: 2018-06-22 | End: 2018-12-26 | Stop reason: SDUPTHER

## 2018-06-28 DIAGNOSIS — J44.9 CHRONIC OBSTRUCTIVE PULMONARY DISEASE, UNSPECIFIED COPD TYPE (HCC): ICD-10-CM

## 2018-06-28 RX ORDER — BUDESONIDE AND FORMOTEROL FUMARATE DIHYDRATE 160; 4.5 UG/1; UG/1
AEROSOL RESPIRATORY (INHALATION)
Qty: 10.2 G | Refills: 6 | Status: SHIPPED | OUTPATIENT
Start: 2018-06-28 | End: 2018-09-04 | Stop reason: SDUPTHER

## 2018-07-25 ENCOUNTER — OFFICE VISIT (OUTPATIENT)
Dept: FAMILY MEDICINE CLINIC | Facility: CLINIC | Age: 58
End: 2018-07-25

## 2018-07-25 VITALS
SYSTOLIC BLOOD PRESSURE: 132 MMHG | HEIGHT: 62 IN | DIASTOLIC BLOOD PRESSURE: 66 MMHG | WEIGHT: 283.31 LBS | BODY MASS INDEX: 52.14 KG/M2

## 2018-07-25 DIAGNOSIS — E11.8 TYPE 2 DIABETES MELLITUS WITH COMPLICATION, UNSPECIFIED LONG TERM INSULIN USE STATUS: ICD-10-CM

## 2018-07-25 DIAGNOSIS — L02.91 ABSCESS: Primary | ICD-10-CM

## 2018-07-25 DIAGNOSIS — D50.8 OTHER IRON DEFICIENCY ANEMIA: ICD-10-CM

## 2018-07-25 PROBLEM — E11.42 DM TYPE 2 WITH DIABETIC PERIPHERAL NEUROPATHY: Status: ACTIVE | Noted: 2018-01-16

## 2018-07-25 PROBLEM — J18.9 PNEUMONIA, ORGANISM UNSPECIFIED(486): Status: ACTIVE | Noted: 2018-01-15

## 2018-07-25 PROCEDURE — 99213 OFFICE O/P EST LOW 20 MIN: CPT | Performed by: STUDENT IN AN ORGANIZED HEALTH CARE EDUCATION/TRAINING PROGRAM

## 2018-07-25 RX ORDER — FERROUS SULFATE 325(65) MG
325 TABLET ORAL
Qty: 30 TABLET | Refills: 3 | Status: SHIPPED | OUTPATIENT
Start: 2018-07-25 | End: 2018-09-04 | Stop reason: SDUPTHER

## 2018-07-25 RX ORDER — NYSTATIN 100000 [USP'U]/G
POWDER TOPICAL 3 TIMES DAILY
Qty: 15 G | Refills: 1 | Status: SHIPPED | OUTPATIENT
Start: 2018-07-25 | End: 2019-02-06 | Stop reason: SDUPTHER

## 2018-07-26 RX ORDER — CYCLOBENZAPRINE HCL 10 MG
10 TABLET ORAL 2 TIMES DAILY PRN
Qty: 60 TABLET | Refills: 2 | Status: SHIPPED | OUTPATIENT
Start: 2018-07-26 | End: 2018-11-07 | Stop reason: SDUPTHER

## 2018-07-30 ENCOUNTER — CONSULT (OUTPATIENT)
Dept: SURGERY | Facility: CLINIC | Age: 58
End: 2018-07-30

## 2018-07-30 VITALS
SYSTOLIC BLOOD PRESSURE: 112 MMHG | HEIGHT: 62 IN | DIASTOLIC BLOOD PRESSURE: 84 MMHG | WEIGHT: 289 LBS | BODY MASS INDEX: 53.18 KG/M2

## 2018-07-30 DIAGNOSIS — IMO0001 POSTOPERATIVE STITCH ABSCESS, INITIAL ENCOUNTER: Primary | ICD-10-CM

## 2018-07-30 PROCEDURE — 99213 OFFICE O/P EST LOW 20 MIN: CPT | Performed by: SURGERY

## 2018-07-30 RX ORDER — FUROSEMIDE 40 MG/1
40 TABLET ORAL DAILY
Refills: 3 | COMMUNITY
Start: 2018-07-26 | End: 2018-09-04

## 2018-08-01 NOTE — PROGRESS NOTES
CHIEF COMPLAINT:    Chronic swelling and drainage from left lower extremity    HISTORY OF PRESENT ILLNESS:    Yamileth Slater is a 58 y.o. female who had open heart surgery 5 years ago with vein harvesting for bypasses.  She states that she has intermittently had darkening of the skin at the distal aspect of her vein harvesting site on the left lower extremity.  Recently she has had swelling and intermittent drainage from this area as well.  She and her  report that when cleaning this area they believe they saw a stitch within the wound.  Although the area has intermittently healed a continues to reopen.  She notes no other similar wounds.    Past Medical History:   Diagnosis Date   • Anxiety states    • Asthma    • Carotid artery stenosis     DWIGHT 0-15%, LICA 0-15%. LICA stent 5/2014.   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 Normal LV systolic function with no wall motion abnormality.    • CARDIAC CATHETERIZATION  05/27/2014     LEFT Carotid Stent    • CAROTID STENT Left  05/27/2014   • COLONOSCOPY  05/02/2016    Normal colon.Diverticulosis in the sigmoid colon.No specimens collected.    • CORONARY ARTERY BYPASS GRAFT  11/15/2013    CABG x 3 with LIMA to LAD and coronary endarterectomy to LAD, SVG to Ramus intermedius branch and SVG to PDA.    • ENDOSCOPY  09/23/2015     Esophageal stricture present.Normal stomach.Normal duodenum.    • ENDOSCOPY  11/16/2015    w/ dilatation - Esophageal stricture present,Dilatation performed.Normal stomach and duodenum.    • INCISION AND DRAINAGE ABSCESS  01/02/2016    Incision and drainage of right perineal abscess.    • INCISION AND DRAINAGE ABSCESS  02/18/2014    Incision and Drainage of abscess of left lower leg    • OTHER SURGICAL HISTORY  01/25/2016    DESTRUCTION OF BENIGN LESION      • OTHER SURGICAL HISTORY  04/18/2014    ear/neck - L attempted CEA, Neck Dissection        Prior to Admission medications    Medication Sig Start Date End Date Taking? Authorizing Provider   albuterol (ACCUNEB) 0.63 MG/3ML nebulizer solution Take 3 mL by nebulization Every 6 (Six) Hours As Needed for Wheezing. 8/26/17  Yes Angel Hernandez MD   albuterol (PROVENTIL HFA;VENTOLIN HFA) 108 (90 Base) MCG/ACT inhaler Inhale 2 puffs Every 4 (Four) Hours As Needed for Wheezing. 4/5/18  Yes aYdira Delarosa MD   atorvastatin (LIPITOR) 20 MG tablet Take 1 tablet by mouth Daily. 4/5/18  Yes Yadira Delarosa MD   Blood Glucose Monitoring Suppl ([employer] BLOOD GLUCOSE METER) W/DEVICE kit 1 each 3 (Three) Times a Day. 11/10/16  Yes Yadira Delarosa MD   cetirizine (zyrTEC) 10 MG tablet TAKE 1 TABLET BY MOUTH DAILY. 3/2/18  Yes Yadira Delarosa MD   clobetasol (CLOBEX) 0.05 % lotion apply to skin bid x 1-2w then prn. generic 2/2/18  Yes Yadira Delarosa MD   clopidogrel (PLAVIX) 75 MG tablet Take 1 tablet by mouth Daily. 4/5/18  Yes Yadira Delarosa MD   cyclobenzaprine (FLEXERIL) 10 MG tablet TAKE 1 TABLET BY MOUTH 2 (TWO) TIMES A DAY AS NEEDED FOR  MUSCLE SPASMS. 7/26/18  Yes Pauline Martinez MD   Empagliflozin (JARDIANCE) 10 MG tablet Take 10 mg by mouth Every Morning. 4/5/18  Yes Yadira Delarosa MD   ferrous sulfate (FERROUSUL) 325 (65 FE) MG tablet Take 1 tablet by mouth Daily With Breakfast. 7/25/18  Yes Huy Palma MD   furosemide (LASIX) 80 MG tablet TAKE 1/2 TABLET BY MOUTH EVERY DAY 3/2/18  Yes Yadira Delarosa MD   gabapentin (NEURONTIN) 800 MG tablet Take 1 tablet by mouth 3 (Three) Times a Day. For neuropathy 4/4/18  Yes Yadira Delarosa MD   glucose blood test strip 1 each by Other route 4 (Four) Times a Day. For blood glucose monitoring. Dx: E11.628 6/12/17  Yes Yadira Delarosa MD   insulin glargine (LANTUS) 100 UNIT/ML injection Inject 95 Units under the skin Every 12 (Twelve) Hours. 4/5/18  Yes Yadira Delarosa MD   Insulin Lispro (HUMALOG KWIKPEN) 100 UNIT/ML solution pen-injector Inject 45 Units under the skin 3 (Three) Times a Day Before Meals. 4/5/18  Yes Yadira Delarosa MD   Insulin Pen Needle (NOVOFINE) 30G X 8 MM misc As directed 4 times daily 10/25/17  Yes Yadira Delarosa MD   ipratropium-albuterol (DUO-NEB) 0.5-2.5 mg/mL nebulizer Take 3 mL by nebulization 4 (Four) Times a Day. ICD10 code J96.01 3/21/17  Yes Yadira Delarosa MD   Liraglutide (VICTOZA) 18 MG/3ML solution pen-injector injection Inject 1.2 mg under the skin Every Morning. 4/5/18  Yes Yadira Delarosa MD   lisinopril (PRINIVIL,ZESTRIL) 10 MG tablet Take 1 tablet by mouth Daily. 4/5/18  Yes Yadira Delarosa MD   metFORMIN (GLUMETZA) 1000 MG (MOD) 24 hr tablet Take 1 tablet by mouth 2 (Two) Times a Day With Meals. 4/5/18  Yes Yadira Delarosa MD   metoprolol tartrate (LOPRESSOR) 50 MG tablet Take 1 tablet by mouth Every 12 (Twelve) Hours. 4/5/18  Yes Yadira Delarosa MD   montelukast (SINGULAIR) 10 MG tablet Take 1 tablet by mouth Every Night. 4/5/18  Yes Yadira Delarosa MD   naproxen (NAPROSYN) 500 MG  tablet Take 1 tablet by mouth 2 (Two) Times a Day With Meals. 4/5/18  Yes Yadira Delarosa MD   nitroglycerin (NITROSTAT) 0.4 MG SL tablet Place 1 tablet under the tongue As Needed for Chest Pain. Take no more than 3 doses in 15 minutes. 3/31/17  Yes Yadira Delarosa MD   nystatin (MYCOSTATIN) 629112 UNIT/GM powder Apply  topically to the appropriate area as directed 3 (Three) Times a Day. 7/25/18  Yes Huy Palma MD   pantoprazole (PROTONIX) 40 MG EC tablet Take 1 tablet by mouth Daily. 4/5/18  Yes Yadira Delarosa MD   potassium chloride (K-DUR) 10 MEQ CR tablet TAKE 1 TABLET BY MOUTH EVERY NIGHT. 6/22/18  Yes Yadira Delarosa MD   promethazine (PHENERGAN) 25 MG tablet Take 1 tablet by mouth Every 6 (Six) Hours As Needed for Nausea or Vomiting. 2/2/18  Yes Yadira Delarosa MD   SYMBICORT 160-4.5 MCG/ACT inhaler INHALE 2 PUFFS INTO LUNGS 2 (TWO) TIMES A DAY-RINSE MOUTH AFTER USE 6/28/18  Yes Yadira Delarosa MD   furosemide (LASIX) 40 MG tablet Take 40 mg by mouth Daily. 7/26/18   Historical Provider, MD       Allergies   Allergen Reactions   • Other      Bandaids, MRSA, SURECLOSE       Family History   Problem Relation Age of Onset   • Coronary artery disease Other    • Diabetes Other    • Heart disease Other    • Hypertension Other        Social History     Social History   • Marital status:      Spouse name: N/A   • Number of children: N/A   • Years of education: N/A     Occupational History   • Not on file.     Social History Main Topics   • Smoking status: Current Every Day Smoker     Packs/day: 0.25     Types: Cigarettes   • Smokeless tobacco: Never Used   • Alcohol use No   • Drug use: No   • Sexual activity: Not on file     Other Topics Concern   • Not on file     Social History Narrative   • No narrative on file       Review of Systems   Constitutional: Negative for appetite change, chills, fever and unexpected weight change.   HENT: Negative for hearing loss,  "nosebleeds and trouble swallowing.    Eyes: Negative for visual disturbance.   Respiratory: Positive for shortness of breath and wheezing. Negative for apnea, cough, choking, chest tightness and stridor.    Cardiovascular: Positive for chest pain and leg swelling. Negative for palpitations.   Gastrointestinal: Positive for diarrhea and nausea. Negative for abdominal distention, abdominal pain, blood in stool, constipation and vomiting.   Endocrine: Negative for cold intolerance, heat intolerance, polydipsia, polyphagia and polyuria.   Genitourinary: Negative for difficulty urinating, dysuria, frequency, hematuria and urgency.   Musculoskeletal: Positive for back pain. Negative for arthralgias, myalgias and neck pain.   Skin: Negative for color change, pallor and rash.   Allergic/Immunologic: Negative for immunocompromised state.   Neurological: Positive for dizziness, weakness, numbness and headaches. Negative for seizures, syncope and light-headedness.   Hematological: Negative for adenopathy.   Psychiatric/Behavioral: Negative for suicidal ideas. The patient is not nervous/anxious.        Objective     /84   Ht 157.5 cm (62\")   Wt 131 kg (289 lb)   LMP  (LMP Unknown)   BMI 52.86 kg/m²     Physical Exam   Constitutional: She is oriented to person, place, and time. She appears well-developed and well-nourished. No distress.   HENT:   Head: Normocephalic and atraumatic.   Eyes: Pupils are equal, round, and reactive to light. Conjunctivae and EOM are normal. Right eye exhibits no discharge. Left eye exhibits no discharge.   Neck: Normal range of motion. Neck supple. No JVD present. No tracheal deviation present. No thyromegaly present.   Cardiovascular: Normal rate, regular rhythm and normal heart sounds.  Exam reveals no gallop and no friction rub.    No murmur heard.  Pulmonary/Chest: Effort normal and breath sounds normal. No respiratory distress. She has no wheezes. She has no rales. She exhibits no " tenderness.   Abdominal: Soft. She exhibits no distension and no mass. There is no tenderness. There is no rebound and no guarding. No hernia.   Musculoskeletal: Normal range of motion. She exhibits no edema, tenderness or deformity.   Neurological: She is alert and oriented to person, place, and time. No cranial nerve deficit.   Skin: Skin is warm and dry. No rash noted. She is not diaphoretic. No erythema. No pallor.        Psychiatric: She has a normal mood and affect. Her behavior is normal. Judgment and thought content normal.         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  The patient was instructed in local wound care to the site.  We will see her back in 2 weeks to assess for healing.          This document has been electronically signed by Huy Ramsey MD on August 1, 2018 3:37 PM

## 2018-08-11 ENCOUNTER — APPOINTMENT (OUTPATIENT)
Dept: CT IMAGING | Facility: HOSPITAL | Age: 58
End: 2018-08-11

## 2018-08-11 ENCOUNTER — HOSPITAL ENCOUNTER (EMERGENCY)
Facility: HOSPITAL | Age: 58
Discharge: HOME | End: 2018-08-11
Attending: EMERGENCY MEDICINE | Admitting: EMERGENCY MEDICINE

## 2018-08-11 ENCOUNTER — APPOINTMENT (OUTPATIENT)
Dept: GENERAL RADIOLOGY | Facility: HOSPITAL | Age: 58
End: 2018-08-11

## 2018-08-11 VITALS
BODY MASS INDEX: 51.89 KG/M2 | OXYGEN SATURATION: 97 % | SYSTOLIC BLOOD PRESSURE: 159 MMHG | DIASTOLIC BLOOD PRESSURE: 88 MMHG | WEIGHT: 282 LBS | RESPIRATION RATE: 16 BRPM | HEART RATE: 79 BPM | HEIGHT: 62 IN | TEMPERATURE: 98.2 F

## 2018-08-11 DIAGNOSIS — H92.01 ACUTE PAIN OF RIGHT EAR: ICD-10-CM

## 2018-08-11 DIAGNOSIS — M54.50 ACUTE LOW BACK PAIN WITHOUT SCIATICA, UNSPECIFIED BACK PAIN LATERALITY: Primary | ICD-10-CM

## 2018-08-11 PROCEDURE — 72131 CT LUMBAR SPINE W/O DYE: CPT

## 2018-08-11 PROCEDURE — 72170 X-RAY EXAM OF PELVIS: CPT

## 2018-08-11 PROCEDURE — 99283 EMERGENCY DEPT VISIT LOW MDM: CPT

## 2018-08-11 RX ORDER — OXYCODONE HYDROCHLORIDE AND ACETAMINOPHEN 5; 325 MG/1; MG/1
1 TABLET ORAL EVERY 4 HOURS PRN
Qty: 8 TABLET | Refills: 0 | Status: SHIPPED | OUTPATIENT
Start: 2018-08-11 | End: 2018-08-22 | Stop reason: SDUPTHER

## 2018-08-11 RX ORDER — IBUPROFEN 800 MG/1
800 TABLET ORAL ONCE
Status: COMPLETED | OUTPATIENT
Start: 2018-08-11 | End: 2018-08-11

## 2018-08-11 RX ORDER — IBUPROFEN 800 MG/1
800 TABLET ORAL EVERY 6 HOURS PRN
Qty: 20 TABLET | Refills: 0 | Status: SHIPPED | OUTPATIENT
Start: 2018-08-11 | End: 2019-03-28

## 2018-08-11 RX ORDER — OXYCODONE HYDROCHLORIDE AND ACETAMINOPHEN 5; 325 MG/1; MG/1
2 TABLET ORAL ONCE
Status: COMPLETED | OUTPATIENT
Start: 2018-08-11 | End: 2018-08-11

## 2018-08-11 RX ORDER — METHOCARBAMOL 750 MG/1
1500 TABLET, FILM COATED ORAL ONCE
Status: COMPLETED | OUTPATIENT
Start: 2018-08-11 | End: 2018-08-11

## 2018-08-11 RX ORDER — METHOCARBAMOL 500 MG/1
500 TABLET, FILM COATED ORAL 4 TIMES DAILY
Qty: 20 TABLET | Refills: 0 | Status: SHIPPED | OUTPATIENT
Start: 2018-08-11 | End: 2018-09-19

## 2018-08-11 RX ORDER — AMOXICILLIN AND CLAVULANATE POTASSIUM 875; 125 MG/1; MG/1
1 TABLET, FILM COATED ORAL EVERY 12 HOURS SCHEDULED
Qty: 10 TABLET | Refills: 0 | Status: SHIPPED | OUTPATIENT
Start: 2018-08-11 | End: 2018-08-22

## 2018-08-11 RX ADMIN — METHOCARBAMOL 1500 MG: 750 TABLET ORAL at 21:29

## 2018-08-11 RX ADMIN — IBUPROFEN 800 MG: 800 TABLET ORAL at 21:28

## 2018-08-11 RX ADMIN — OXYCODONE HYDROCHLORIDE AND ACETAMINOPHEN 2 TABLET: 5; 325 TABLET ORAL at 21:29

## 2018-08-12 NOTE — ED PROVIDER NOTES
Subjective   58-year-old female presents to the emergency department with lower back pain.  She states that she fell out of bed last night about 2 AM.  She fell onto her bottom.  There is no head injury or loss of consciousness.  Said that she feels the pain in her lumbar spine.  She is able to ambulate.  She had no bowel or bladder incontinence.  There is no numbness or tingling in her legs.        Back Pain   Location:  Lumbar spine  Quality:  Aching  Radiates to:  Does not radiate  Pain severity:  Severe  Onset quality:  Sudden  Duration:  16 hours  Timing:  Constant  Progression:  Worsening  Associated symptoms: no abdominal pain, no chest pain, no dysuria, no fever and no weakness        Review of Systems   Constitutional: Negative for chills, fatigue and fever.   HENT: Negative for congestion and sore throat.    Eyes: Negative for visual disturbance.   Respiratory: Negative for cough and shortness of breath.    Cardiovascular: Negative for chest pain and leg swelling.   Gastrointestinal: Negative for abdominal pain, nausea and vomiting.   Genitourinary: Negative for dysuria.   Musculoskeletal: Positive for back pain.   Skin: Negative for rash.   Neurological: Negative for dizziness and weakness.   Psychiatric/Behavioral: Negative for behavioral problems.   All other systems reviewed and are negative.      Past Medical History:   Diagnosis Date   • Anxiety states    • Asthma    • Carotid artery stenosis     DWIGHT 0-15%, LICA 0-15%. LICA stent 5/2014.   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 Normal LV systolic function with no wall motion abnormality.    • CARDIAC CATHETERIZATION  05/27/2014     LEFT Carotid Stent    • CAROTID STENT Left 05/27/2014   • COLONOSCOPY  05/02/2016    Normal colon.Diverticulosis in the sigmoid colon.No specimens collected.    • CORONARY ARTERY BYPASS GRAFT  11/15/2013    CABG x 3 with LIMA to LAD and coronary endarterectomy to LAD, SVG to Ramus intermedius branch and SVG to PDA.    • ENDOSCOPY  09/23/2015     Esophageal stricture present.Normal stomach.Normal duodenum.    • ENDOSCOPY  11/16/2015    w/ dilatation - Esophageal stricture present,Dilatation performed.Normal stomach and duodenum.    • INCISION AND DRAINAGE ABSCESS  01/02/2016    Incision and drainage of right perineal abscess.    • INCISION AND DRAINAGE ABSCESS  02/18/2014    Incision and Drainage of abscess of left lower leg    • OTHER SURGICAL HISTORY  01/25/2016    DESTRUCTION OF BENIGN LESION      • OTHER SURGICAL HISTORY  04/18/2014    ear/neck - L attempted CEA, Neck Dissection        Family History   Problem Relation Age of Onset   • Coronary artery disease Other    • Diabetes Other    • Heart disease Other    • Hypertension Other        Social History     Social History   • Marital status:      Social History Main Topics   • Smoking status: Current Every Day Smoker     Packs/day: 0.25     Types: Cigarettes   •  Smokeless tobacco: Never Used   • Alcohol use No   • Drug use: No     Other Topics Concern   • Not on file           Objective   Physical Exam   Constitutional: She is oriented to person, place, and time. She appears well-developed and well-nourished.   HENT:   Head: Normocephalic and atraumatic.   Eyes: Pupils are equal, round, and reactive to light. EOM are normal.   Neck: Normal range of motion. Neck supple.   No midline tenderness   Cardiovascular: Normal rate, regular rhythm, normal heart sounds and intact distal pulses.  Exam reveals no gallop and no friction rub.    No murmur heard.  Pulmonary/Chest: Breath sounds normal. No respiratory distress. She has no wheezes. She has no rales.   No rhonchi   Abdominal: Soft. Bowel sounds are normal. She exhibits no distension. There is no tenderness.   Musculoskeletal: Normal range of motion. She exhibits tenderness. She exhibits no deformity.   Mild midline spinal tenderness in the lumbar spine.  There is no step-off.  Pelvis is stable   Neurological: She is alert and oriented to person, place, and time. No cranial nerve deficit. She exhibits normal muscle tone. Coordination normal.   Skin: Skin is warm and dry. No rash noted.   Psychiatric: She has a normal mood and affect. Her behavior is normal.       Procedures           ED Course                  MDM  Number of Diagnoses or Management Options  Diagnosis management comments: 10:36 PM the patient is stable.  The patient is able to ambulate.  She also tolerated and that she's having pain in her right ear for an undetermined period of time I looked at the ear and there is no evidence of otitis externa or otitis media.  She does have some tenderness with movement of the pinna to give her a short course of antibiotics.  She have her follow-up with a primary care doctor for evaluation of both back pain and ear pain.       Amount and/or Complexity of Data Reviewed  Tests in the radiology section of CPT®: ordered and  reviewed    Risk of Complications, Morbidity, and/or Mortality  Presenting problems: moderate  Diagnostic procedures: moderate  Management options: moderate          Final diagnoses:   Acute low back pain without sciatica, unspecified back pain laterality   Acute pain of right ear            Jon Raymond MD  08/11/18 [id number]

## 2018-08-17 DIAGNOSIS — IMO0002 UNCONTROLLED TYPE 2 DIABETES MELLITUS WITH COMPLICATION, WITH LONG-TERM CURRENT USE OF INSULIN: ICD-10-CM

## 2018-08-17 RX ORDER — METFORMIN HYDROCHLORIDE 500 MG/1
TABLET, EXTENDED RELEASE ORAL
Qty: 120 TABLET | Refills: 3 | Status: SHIPPED | OUTPATIENT
Start: 2018-08-17 | End: 2018-12-05 | Stop reason: SDUPTHER

## 2018-08-22 ENCOUNTER — LAB (OUTPATIENT)
Dept: LAB | Facility: HOSPITAL | Age: 58
End: 2018-08-22

## 2018-08-22 ENCOUNTER — OFFICE VISIT (OUTPATIENT)
Dept: FAMILY MEDICINE CLINIC | Facility: CLINIC | Age: 58
End: 2018-08-22

## 2018-08-22 VITALS
DIASTOLIC BLOOD PRESSURE: 82 MMHG | SYSTOLIC BLOOD PRESSURE: 130 MMHG | TEMPERATURE: 98.7 F | BODY MASS INDEX: 51.89 KG/M2 | HEIGHT: 62 IN | WEIGHT: 282 LBS | HEART RATE: 84 BPM | OXYGEN SATURATION: 97 %

## 2018-08-22 DIAGNOSIS — M54.5 LOW BACK PAIN, UNSPECIFIED BACK PAIN LATERALITY, UNSPECIFIED CHRONICITY, WITH SCIATICA PRESENCE UNSPECIFIED: ICD-10-CM

## 2018-08-22 DIAGNOSIS — M54.5 LOW BACK PAIN, UNSPECIFIED BACK PAIN LATERALITY, UNSPECIFIED CHRONICITY, WITH SCIATICA PRESENCE UNSPECIFIED: Primary | ICD-10-CM

## 2018-08-22 DIAGNOSIS — R35.0 URINARY FREQUENCY: ICD-10-CM

## 2018-08-22 LAB
ALBUMIN SERPL-MCNC: 3.7 G/DL (ref 3.4–4.8)
ALBUMIN/GLOB SERPL: 0.9 G/DL (ref 1.1–1.8)
ALP SERPL-CCNC: 141 U/L (ref 38–126)
ALT SERPL W P-5'-P-CCNC: 31 U/L (ref 9–52)
ANION GAP SERPL CALCULATED.3IONS-SCNC: 9 MMOL/L (ref 5–15)
AST SERPL-CCNC: 68 U/L (ref 14–36)
BILIRUB BLD-MCNC: NEGATIVE MG/DL
BILIRUB SERPL-MCNC: 0.3 MG/DL (ref 0.2–1.3)
BUN BLD-MCNC: 16 MG/DL (ref 7–21)
BUN/CREAT SERPL: 17.8 (ref 7–25)
CALCIUM SPEC-SCNC: 8.8 MG/DL (ref 8.4–10.2)
CHLORIDE SERPL-SCNC: 102 MMOL/L (ref 95–110)
CLARITY, POC: CLEAR
CO2 SERPL-SCNC: 27 MMOL/L (ref 22–31)
COLOR UR: YELLOW
CREAT BLD-MCNC: 0.9 MG/DL (ref 0.5–1)
GFR SERPL CREATININE-BSD FRML MDRD: 64 ML/MIN/1.73 (ref 51–120)
GLOBULIN UR ELPH-MCNC: 4 GM/DL (ref 2.3–3.5)
GLUCOSE BLD-MCNC: 71 MG/DL (ref 60–100)
GLUCOSE UR STRIP-MCNC: ABNORMAL MG/DL
KETONES UR QL: NEGATIVE
LEUKOCYTE EST, POC: NEGATIVE
NITRITE UR-MCNC: NEGATIVE MG/ML
PH UR: 6 [PH] (ref 5–8)
POTASSIUM BLD-SCNC: 4.3 MMOL/L (ref 3.5–5.1)
PROT SERPL-MCNC: 7.7 G/DL (ref 6.3–8.6)
PROT UR STRIP-MCNC: ABNORMAL MG/DL
RBC # UR STRIP: ABNORMAL /UL
SODIUM BLD-SCNC: 138 MMOL/L (ref 137–145)
SP GR UR: 1 (ref 1–1.03)
UROBILINOGEN UR QL: NORMAL

## 2018-08-22 PROCEDURE — 81002 URINALYSIS NONAUTO W/O SCOPE: CPT | Performed by: FAMILY MEDICINE

## 2018-08-22 PROCEDURE — 36415 COLL VENOUS BLD VENIPUNCTURE: CPT

## 2018-08-22 PROCEDURE — 99213 OFFICE O/P EST LOW 20 MIN: CPT | Performed by: FAMILY MEDICINE

## 2018-08-22 PROCEDURE — 80053 COMPREHEN METABOLIC PANEL: CPT

## 2018-08-22 PROCEDURE — 96372 THER/PROPH/DIAG INJ SC/IM: CPT | Performed by: FAMILY MEDICINE

## 2018-08-22 RX ORDER — KETOROLAC TROMETHAMINE 30 MG/ML
60 INJECTION, SOLUTION INTRAMUSCULAR; INTRAVENOUS ONCE
Status: COMPLETED | OUTPATIENT
Start: 2018-08-22 | End: 2018-08-22

## 2018-08-22 RX ORDER — METHOCARBAMOL 750 MG/1
750 TABLET, FILM COATED ORAL 4 TIMES DAILY PRN
Qty: 30 TABLET | Refills: 1 | Status: SHIPPED | OUTPATIENT
Start: 2018-08-22 | End: 2019-03-28

## 2018-08-22 RX ORDER — OXYCODONE HYDROCHLORIDE AND ACETAMINOPHEN 5; 325 MG/1; MG/1
1 TABLET ORAL EVERY 6 HOURS PRN
Qty: 12 TABLET | Refills: 0 | Status: SHIPPED | OUTPATIENT
Start: 2018-08-22 | End: 2018-09-19

## 2018-08-22 RX ORDER — METHYLPREDNISOLONE 4 MG/1
4 TABLET ORAL DAILY
Qty: 21 TABLET | Refills: 0 | Status: SHIPPED | OUTPATIENT
Start: 2018-08-22 | End: 2018-09-19

## 2018-08-22 RX ORDER — MELOXICAM 7.5 MG/1
7.5 TABLET ORAL DAILY
Qty: 30 TABLET | Refills: 1 | Status: SHIPPED | OUTPATIENT
Start: 2018-08-22 | End: 2019-03-16 | Stop reason: ALTCHOICE

## 2018-08-22 RX ORDER — INSULIN GLARGINE 100 [IU]/ML
INJECTION, SOLUTION SUBCUTANEOUS
Refills: 11 | COMMUNITY
Start: 2018-08-17 | End: 2018-09-04 | Stop reason: SDUPTHER

## 2018-08-22 RX ADMIN — KETOROLAC TROMETHAMINE 60 MG: 30 INJECTION, SOLUTION INTRAMUSCULAR; INTRAVENOUS at 11:48

## 2018-08-22 NOTE — PROGRESS NOTES
I have reviewed the notes, assessments, and/or procedures performed by Dr Chong, I agree with her/his documentation and assessment and plan for Yamileth Slater.

## 2018-08-22 NOTE — PROGRESS NOTES
ID: Yamileth Slater    CC:   Chief Complaint   Patient presents with   • Back Pain   • Fall   • Urinary Tract Infection     possible        Subjective:     HPI     Yamileth Slater is a 58 y.o. female who presents for fall 2 fridays ago, then Saturday was seen in ED.  The back pain has continued even with use of antiinflamatory and muscle relaxer.  Patient states pain is so painful she cannot lay flat.  Patient feel 2.5 feet off a bed to concrete floor on her butt.  Back pain is located mid then down to hips and knees.  Patient complains of tingling and numbness of the legs last week.  Tingling and numbness has improved, it was both feet lasted a while.  It is less frequent now.  Back pain is described sharp and throbbing.  Improved with movement, worsened with walking and getting up.  Pain rated is 10/10.  It hurts worse than when in ED.  She also has hip pain on the left which radiates all the way to the knee.  Patient also states she cannot control her bowels when in the bathroom.  Loose stools.  Patient has tried icing and heating the back.      Suspected UTI:    Patient complains of pressure, increased frequency, less urine output, no dysuria or hematuria.  Patient feels she may have a UTI due to recent antibiotic use given by ED. She requests testing.  U/A here in office only shows blood and protein.  Patient was informed of this and the lack of necessity for treatment of UTI.      Preventative:  Over the past 2 weeks, have you felt down, depressed, or hopeless?No   Over the past 2 weeks, have you felt little interest or pleasure in doing things?No  Clinical depression screening refused by patient.No     On osteoporosis therapy?No     Past Medical Hx:  Past Medical History:   Diagnosis Date   • Anxiety states    • Asthma    • Carotid artery stenosis     DWIGHT 0-15%, LICA 0-15%. LICA stent 5/2014.   • Chronic folliculitis    • Chronic obstructive lung disease (CMS/[facility])    • Coronary arteriosclerosis     • Diabetes mellitus (CMS/[facility])     no retinopathy; A1C 9.1      • Dyslipidemia    • Essential hypertension    • Excoriated eczema     asteosis/keratosis   • GERD (gastroesophageal reflux disease)    • History of colon polyps    • Hypertensive disorder    • Kidney stone    • Mixed hyperlipidemia    • Morbid obesity due to excess calories (CMS/[facility])     BMI 44.5   • Neurologic disorder associated with diabetes mellitus (CMS/[facility])    • Non-healing surgical wound     LLE EVHa   • Peripheral vascular disease (CMS/[facility])    • Sleep apnea    • Tobacco dependence syndrome     1/2ppd      • Type 2 diabetes mellitus (CMS/[facility])    • Vitamin D deficiency        Past Surgical Hx:  Past Surgical History:   Procedure Laterality Date   • CARDIAC CATHETERIZATION  08/09/2013    Severe multivessel CAD with critical lesions noted in the RCA, obtuse marginal two, ramus intermemdious, and LAD as described above. Normal LV systolic function with no wall motion abnormality.    • CARDIAC CATHETERIZATION  05/27/2014     LEFT Carotid Stent    • CAROTID STENT Left 05/27/2014   • COLONOSCOPY  05/02/2016    Normal colon.Diverticulosis in the sigmoid colon.No specimens collected.    • CORONARY ARTERY BYPASS GRAFT  11/15/2013    CABG x 3 with LIMA to LAD and coronary endarterectomy to LAD, SVG to Ramus intermedius branch and SVG to PDA.    • ENDOSCOPY  09/23/2015     Esophageal stricture present.Normal stomach.Normal duodenum.    • ENDOSCOPY  11/16/2015    w/ dilatation - Esophageal stricture present,Dilatation performed.Normal stomach and duodenum.    • INCISION AND DRAINAGE ABSCESS  01/02/2016    Incision and drainage of right perineal abscess.    • INCISION AND DRAINAGE ABSCESS  02/18/2014    Incision and Drainage of abscess of left lower leg    • OTHER SURGICAL HISTORY  01/25/2016    DESTRUCTION OF BENIGN LESION      • OTHER SURGICAL HISTORY  04/18/2014    ear/neck - L attempted CEA, Neck Dissection        Health Maintenance:  Health Maintenance    Topic Date Due   • ZOSTER VACCINE (1 of 2) 03/10/2009   • MEDICARE ANNUAL WELLNESS  08/24/2016   • DIABETIC EYE EXAM  08/24/2016   • INFLUENZA VACCINE  08/01/2018   • HEMOGLOBIN A1C  10/04/2018   • URINE MICROALBUMIN  10/05/2018   • DIABETIC FOOT EXAM  11/29/2018   • MAMMOGRAM  01/04/2019   • LIPID PANEL  04/04/2019   • PAP SMEAR  01/04/2020   • COLONOSCOPY  05/02/2021   • TDAP/TD VACCINES (2 - Td) 11/10/2024   • PNEUMOCOCCAL VACCINE (19-64 MEDIUM RISK)  Completed   • HEPATITIS C SCREENING  Completed       Current Meds:    Current Outpatient Prescriptions:   •  albuterol (ACCUNEB) 0.63 MG/3ML nebulizer solution, Take 3 mL by nebulization Every 6 (Six) Hours As Needed for Wheezing., Disp: 20 vial, Rfl: 0  •  albuterol (PROVENTIL HFA;VENTOLIN HFA) 108 (90 Base) MCG/ACT inhaler, Inhale 2 puffs Every 4 (Four) Hours As Needed for Wheezing., Disp: 1 inhaler, Rfl: 0  •  Blood Glucose Monitoring Suppl ([employer] BLOOD GLUCOSE METER) W/DEVICE kit, 1 each 3 (Three) Times a Day., Disp: 1 kit, Rfl: 3  •  cetirizine (zyrTEC) 10 MG tablet, TAKE 1 TABLET BY MOUTH DAILY., Disp: 30 tablet, Rfl: 3  •  clobetasol (CLOBEX) 0.05 % lotion, apply to skin bid x 1-2w then prn. generic, Disp: 118 mL, Rfl: 0  •  clopidogrel (PLAVIX) 75 MG tablet, Take 1 tablet by mouth Daily., Disp: 30 tablet, Rfl: 3  •  cyclobenzaprine (FLEXERIL) 10 MG tablet, TAKE 1 TABLET BY MOUTH 2 (TWO) TIMES A DAY AS NEEDED FOR MUSCLE SPASMS., Disp: 60 tablet, Rfl: 2  •  Empagliflozin (JARDIANCE) 10 MG tablet, Take 10 mg by mouth Every Morning., Disp: 30 tablet, Rfl: 3  •  ferrous sulfate (FERROUSUL) 325 (65 FE) MG tablet, Take 1 tablet by mouth Daily With Breakfast., Disp: 30 tablet, Rfl: 3  •  furosemide (LASIX) 40 MG tablet, Take 40 mg by mouth Daily., Disp: , Rfl: 3  •  furosemide (LASIX) 80 MG tablet, TAKE 1/2 TABLET BY MOUTH EVERY DAY, Disp: 15 tablet, Rfl: 3  •  gabapentin (NEURONTIN) 800 MG tablet, Take 1 tablet by mouth 3 (Three) Times a Day. For neuropathy, Disp:  90 tablet, Rfl: 3  •  glucose blood test strip, Use as instructed, Disp: 100 each, Rfl: 12  •  ibuprofen (ADVIL,MOTRIN) 800 MG tablet, Take 1 tablet by mouth Every 6 (Six) Hours As Needed for Mild Pain ., Disp: 20 tablet, Rfl: 0  •  insulin glargine (LANTUS) 100 UNIT/ML injection, Inject 95 Units under the skin Every 12 (Twelve) Hours., Disp: 1 each, Rfl: 11  •  Insulin Lispro (HUMALOG KWIKPEN) 100 UNIT/ML solution pen-injector, Inject 45 Units under the skin 3 (Three) Times a Day Before Meals., Disp: 60 mL, Rfl: 11  •  Insulin Pen Needle (NOVOFINE) 30G X 8 MM misc, As directed 4 times daily, Disp: 100 each, Rfl: 11  •  ipratropium-albuterol (DUO-NEB) 0.5-2.5 mg/mL nebulizer, Take 3 mL by nebulization 4 (Four) Times a Day. ICD10 code J96.01, Disp: 360 mL, Rfl: 0  •  LANTUS SOLOSTAR 100 UNIT/ML injection pen, INJECT 95 UNITS UNDER THE SKIN EVERY 12  TWELVE  HOURS, Disp: , Rfl: 11  •  Liraglutide (VICTOZA) 18 MG/3ML solution pen-injector injection, Inject 1.2 mg under the skin Every Morning., Disp: 9 mL, Rfl: 3  •  lisinopril (PRINIVIL,ZESTRIL) 10 MG tablet, Take 1 tablet by mouth Daily., Disp: 30 tablet, Rfl: 3  •  metFORMIN ER (GLUCOPHAGE-XR) 500 MG 24 hr tablet, TAKE 2 TABLET BY MOUTH 2 (TWO) TIMES A DAY WITH MEALS., Disp: 120 tablet, Rfl: 3  •  methocarbamol (ROBAXIN) 500 MG tablet, Take 1 tablet by mouth 4 (Four) Times a Day., Disp: 20 tablet, Rfl: 0  •  metoprolol tartrate (LOPRESSOR) 50 MG tablet, Take 1 tablet by mouth Every 12 (Twelve) Hours., Disp: 60 tablet, Rfl: 3  •  nitroglycerin (NITROSTAT) 0.4 MG SL tablet, Place 1 tablet under the tongue As Needed for Chest Pain. Take no more than 3 doses in 15 minutes., Disp: 30 tablet, Rfl: 3  •  nystatin (MYCOSTATIN) 760320 UNIT/GM powder, Apply  topically to the appropriate area as directed 3 (Three) Times a Day., Disp: 15 g, Rfl: 1  •  oxyCODONE-acetaminophen (PERCOCET) 5-325 MG per tablet, Take 1 tablet by mouth Every 6 (Six) Hours As Needed for Severe Pain .,  Disp: 12 tablet, Rfl: 0  •  pantoprazole (PROTONIX) 40 MG EC tablet, Take 1 tablet by mouth Daily., Disp: 30 tablet, Rfl: 3  •  potassium chloride (K-DUR) 10 MEQ CR tablet, TAKE 1 TABLET BY MOUTH EVERY NIGHT., Disp: 30 tablet, Rfl: 3  •  promethazine (PHENERGAN) 25 MG tablet, Take 1 tablet by mouth Every 6 (Six) Hours As Needed for Nausea or Vomiting., Disp: 30 tablet, Rfl: 0  •  SYMBICORT 160-4.5 MCG/ACT inhaler, INHALE 2 PUFFS INTO LUNGS 2 (TWO) TIMES A DAY-RINSE MOUTH AFTER USE, Disp: 10.2 g, Rfl: 6  •  atorvastatin (LIPITOR) 20 MG tablet, Take 1 tablet by mouth Daily., Disp: 30 tablet, Rfl: 3  •  meloxicam (MOBIC) 7.5 MG tablet, Take 1 tablet by mouth Daily. Start medication on 8/24/18, Disp: 30 tablet, Rfl: 1  •  methocarbamol (ROBAXIN) 750 MG tablet, Take 1 tablet by mouth 4 (Four) Times a Day As Needed for Muscle Spasms., Disp: 30 tablet, Rfl: 1  •  methylPREDNISolone (MEDROL) 4 MG tablet, Take 1 tablet by mouth Daily. Please follow instructions on medrol dose zachary, Disp: 21 tablet, Rfl: 0  •  montelukast (SINGULAIR) 10 MG tablet, Take 1 tablet by mouth Every Night., Disp: 30 tablet, Rfl: 3    Allergies:  Other    Family Hx:  Family History   Problem Relation Age of Onset   • Coronary artery disease Other    • Diabetes Other    • Heart disease Other    • Hypertension Other         Social History:  Social History     Social History   • Marital status:      Spouse name: N/A   • Number of children: N/A   • Years of education: N/A     Occupational History   • Not on file.     Social History Main Topics   • Smoking status: Current Every Day Smoker     Packs/day: 0.25     Types: Cigarettes   • Smokeless tobacco: Never Used   • Alcohol use No   • Drug use: No   • Sexual activity: Not on file     Other Topics Concern   • Not on file     Social History Narrative   • No narrative on file       Review of Systems   Constitutional: Negative for activity change, appetite change, fatigue and fever.   HENT: Negative  "for ear pain and sore throat.    Eyes: Negative for pain and visual disturbance.   Respiratory: Negative for cough and shortness of breath.    Cardiovascular: Negative for chest pain and palpitations.   Gastrointestinal: Negative for abdominal pain and nausea.   Endocrine: Negative for cold intolerance and heat intolerance.   Genitourinary: Negative for difficulty urinating and dysuria.   Musculoskeletal: Positive for back pain and gait problem. Negative for arthralgias.   Skin: Negative for color change and rash.   Neurological: Negative for dizziness, weakness and headaches.   Hematological: Negative for adenopathy. Does not bruise/bleed easily.   Psychiatric/Behavioral: Negative for agitation, confusion and sleep disturbance.           Objective:     /82 (BP Location: Left arm, Patient Position: Sitting, Cuff Size: Large Adult)   Pulse 84   Temp 98.7 °F (37.1 °C)   Ht 157.5 cm (62\") Comment: pt in wheelchair  Wt 128 kg (282 lb) Comment: pt in wheelchair  LMP  (LMP Unknown)   SpO2 97%   BMI 51.58 kg/m²     Physical Exam   Constitutional: She is oriented to person, place, and time. She appears well-developed and well-nourished. No distress.   HENT:   Head: Normocephalic and atraumatic.   Nose: Nose normal.   Eyes: Conjunctivae are normal. Right eye exhibits no discharge. Left eye exhibits no discharge.   Neck: Neck supple.   Cardiovascular: Normal rate, regular rhythm and normal heart sounds.  Exam reveals no gallop and no friction rub.    No murmur heard.  Pulmonary/Chest: Effort normal and breath sounds normal. No accessory muscle usage. No respiratory distress. She has no wheezes. She has no rales. She exhibits no tenderness.   Abdominal: Soft. Bowel sounds are normal. She exhibits no distension. There is no tenderness.   Musculoskeletal: She exhibits no edema or deformity.   Neurological: She is alert and oriented to person, place, and time.   Skin: Skin is warm and dry. No rash noted. She is not " diaphoretic. No erythema. No pallor.        Psychiatric: She has a normal mood and affect. Her behavior is normal.              Assessment/Plan:     Yamileth was seen today for back pain, fall and urinary tract infection.    Diagnoses and all orders for this visit:    Low back pain, unspecified back pain laterality, unspecified chronicity, with sciatica presence unspecified  -     methocarbamol (ROBAXIN) 750 MG tablet; Take 1 tablet by mouth 4 (Four) Times a Day As Needed for Muscle Spasms.  -     Comprehensive metabolic panel; Future  -     methylPREDNISolone (MEDROL) 4 MG tablet; Take 1 tablet by mouth Daily. Please follow instructions on medrol dose zachary  -     meloxicam (MOBIC) 7.5 MG tablet; Take 1 tablet by mouth Daily. Start medication on 8/24/18  -     oxyCODONE-acetaminophen (PERCOCET) 5-325 MG per tablet; Take 1 tablet by mouth Every 6 (Six) Hours As Needed for Severe Pain .  -     ketorolac (TORADOL) injection 60 mg; Inject 60 mg into the appropriate muscle as directed by prescriber 1 (One) Time.    Urinary frequency  -     POCT urinalysis dipstick, manual          Follow-up:     Return in about 4 weeks (around 9/19/2018) for Recheck back pain.      Goals     • Fasting Blood Glucose             Barriers: compliance with diet          Patient's Body mass index is 51.58 kg/m². BMI is above normal parameters. Recommendations include: exercise counseling.      Health Maintenance   Topic Date Due   • ZOSTER VACCINE (1 of 2) 03/10/2009   • MEDICARE ANNUAL WELLNESS  08/24/2016   • DIABETIC EYE EXAM  08/24/2016   • INFLUENZA VACCINE  08/01/2018   • HEMOGLOBIN A1C  10/04/2018   • URINE MICROALBUMIN  10/05/2018   • DIABETIC FOOT EXAM  11/29/2018   • MAMMOGRAM  01/04/2019   • LIPID PANEL  04/04/2019   • PAP SMEAR  01/04/2020   • COLONOSCOPY  05/02/2021   • TDAP/TD VACCINES (2 - Td) 11/10/2024   • PNEUMOCOCCAL VACCINE (19-64 MEDIUM RISK)  Completed   • HEPATITIS C SCREENING  Completed       Smoking cessation  counseling was provided.  does not drink  eat more fruits and vegetables, decrease soda or juice intake, increase physical activity, reduce portion size, family to eat at dinner table more often and plan meals    RISK SCORE: 4          This document has been electronically signed by Aracely Chong MD on August 28, 2018 9:25 PM

## 2018-08-23 DIAGNOSIS — IMO0002 UNCONTROLLED TYPE 2 DIABETES MELLITUS WITH DIABETIC POLYNEUROPATHY, WITH LONG-TERM CURRENT USE OF INSULIN: ICD-10-CM

## 2018-08-23 DIAGNOSIS — J44.0 CHRONIC OBSTRUCTIVE PULMONARY DISEASE WITH ACUTE LOWER RESPIRATORY INFECTION (HCC): ICD-10-CM

## 2018-08-23 RX ORDER — MONTELUKAST SODIUM 10 MG/1
10 TABLET ORAL NIGHTLY
Qty: 30 TABLET | Refills: 3 | Status: SHIPPED | OUTPATIENT
Start: 2018-08-23 | End: 2018-12-26 | Stop reason: SDUPTHER

## 2018-08-23 RX ORDER — MONTELUKAST SODIUM 10 MG/1
TABLET ORAL
Qty: 30 TABLET | Refills: 3 | OUTPATIENT
Start: 2018-08-23

## 2018-08-23 RX ORDER — ATORVASTATIN CALCIUM 20 MG/1
20 TABLET, FILM COATED ORAL DAILY
Qty: 30 TABLET | Refills: 3 | Status: SHIPPED | OUTPATIENT
Start: 2018-08-23 | End: 2018-12-26 | Stop reason: SDUPTHER

## 2018-08-23 RX ORDER — GABAPENTIN 800 MG/1
800 TABLET ORAL 3 TIMES DAILY
Qty: 90 TABLET | Refills: 3 | OUTPATIENT
Start: 2018-08-23

## 2018-08-23 RX ORDER — GABAPENTIN 800 MG/1
TABLET ORAL
Qty: 90 TABLET | Refills: 3 | Status: CANCELLED | OUTPATIENT
Start: 2018-08-23

## 2018-08-23 RX ORDER — ATORVASTATIN CALCIUM 20 MG/1
TABLET, FILM COATED ORAL
Qty: 30 TABLET | Refills: 3 | OUTPATIENT
Start: 2018-08-23

## 2018-08-23 NOTE — TELEPHONE ENCOUNTER
She will need to come in in order to have her gabapentin refilled. I saw her scheduled for early September. If she will like, we can take care of that then.

## 2018-08-24 ENCOUNTER — TELEPHONE (OUTPATIENT)
Dept: FAMILY MEDICINE CLINIC | Facility: CLINIC | Age: 58
End: 2018-08-24

## 2018-08-24 NOTE — TELEPHONE ENCOUNTER
Pt called said that she is out of meds, and was supposed to have refills sent over to [place] [facility]       She is about out of several meds and would like that sent as soon as can please        Thanks

## 2018-08-29 ENCOUNTER — TELEPHONE (OUTPATIENT)
Dept: FAMILY MEDICINE CLINIC | Facility: CLINIC | Age: 58
End: 2018-08-29

## 2018-08-29 NOTE — TELEPHONE ENCOUNTER
Pt called, said that she was in to see someone on the walk in on 8/22/18, and that she was supposed to have a return call with her lab results and urine results.    Pt said that it was best to reach her at [phone]      Thanks

## 2018-09-04 ENCOUNTER — APPOINTMENT (OUTPATIENT)
Dept: LAB | Facility: HOSPITAL | Age: 58
End: 2018-09-04

## 2018-09-04 ENCOUNTER — OFFICE VISIT (OUTPATIENT)
Dept: FAMILY MEDICINE CLINIC | Facility: CLINIC | Age: 58
End: 2018-09-04

## 2018-09-04 VITALS
HEIGHT: 62 IN | BODY MASS INDEX: 48.96 KG/M2 | SYSTOLIC BLOOD PRESSURE: 138 MMHG | HEART RATE: 85 BPM | WEIGHT: 266.06 LBS | OXYGEN SATURATION: 95 % | DIASTOLIC BLOOD PRESSURE: 82 MMHG

## 2018-09-04 DIAGNOSIS — IMO0002 UNCONTROLLED TYPE 2 DIABETES MELLITUS WITH DIABETIC POLYNEUROPATHY, WITH LONG-TERM CURRENT USE OF INSULIN: ICD-10-CM

## 2018-09-04 DIAGNOSIS — J45.30 MILD PERSISTENT ASTHMA WITHOUT COMPLICATION: ICD-10-CM

## 2018-09-04 DIAGNOSIS — IMO0002 UNCONTROLLED TYPE 2 DIABETES MELLITUS WITH COMPLICATION, WITH LONG-TERM CURRENT USE OF INSULIN: ICD-10-CM

## 2018-09-04 DIAGNOSIS — Z51.81 ENCOUNTER FOR THERAPEUTIC DRUG LEVEL MONITORING: ICD-10-CM

## 2018-09-04 DIAGNOSIS — J44.0 CHRONIC OBSTRUCTIVE PULMONARY DISEASE WITH ACUTE LOWER RESPIRATORY INFECTION (HCC): ICD-10-CM

## 2018-09-04 DIAGNOSIS — Z02.83 ENCOUNTER FOR DRUG SCREENING: ICD-10-CM

## 2018-09-04 DIAGNOSIS — J44.9 CHRONIC OBSTRUCTIVE PULMONARY DISEASE, UNSPECIFIED COPD TYPE (HCC): ICD-10-CM

## 2018-09-04 DIAGNOSIS — D50.8 OTHER IRON DEFICIENCY ANEMIA: ICD-10-CM

## 2018-09-04 DIAGNOSIS — Z02.83 ENCOUNTER FOR BLOOD-ALCOHOL AND BLOOD-DRUG TEST: Primary | ICD-10-CM

## 2018-09-04 LAB
AMPHET+METHAMPHET UR QL: NEGATIVE
BARBITURATES UR QL SCN: NEGATIVE
BENZODIAZ UR QL SCN: NEGATIVE
CANNABINOIDS SERPL QL: NEGATIVE
COCAINE UR QL: NEGATIVE
METHADONE UR QL SCN: NEGATIVE
OPIATES UR QL: NEGATIVE
OXYCODONE UR QL SCN: NEGATIVE

## 2018-09-04 PROCEDURE — 99213 OFFICE O/P EST LOW 20 MIN: CPT | Performed by: STUDENT IN AN ORGANIZED HEALTH CARE EDUCATION/TRAINING PROGRAM

## 2018-09-04 PROCEDURE — 80307 DRUG TEST PRSMV CHEM ANLYZR: CPT | Performed by: STUDENT IN AN ORGANIZED HEALTH CARE EDUCATION/TRAINING PROGRAM

## 2018-09-04 RX ORDER — ALBUTEROL SULFATE 0.63 MG/3ML
1 SOLUTION RESPIRATORY (INHALATION) EVERY 6 HOURS PRN
Qty: 20 VIAL | Refills: 0 | Status: SHIPPED | OUTPATIENT
Start: 2018-09-04 | End: 2019-02-06 | Stop reason: SDUPTHER

## 2018-09-04 RX ORDER — METOPROLOL TARTRATE 50 MG/1
50 TABLET, FILM COATED ORAL EVERY 12 HOURS SCHEDULED
Qty: 60 TABLET | Refills: 3 | Status: SHIPPED | OUTPATIENT
Start: 2018-09-04 | End: 2019-02-06 | Stop reason: SDUPTHER

## 2018-09-04 RX ORDER — GABAPENTIN 800 MG/1
800 TABLET ORAL 3 TIMES DAILY
Qty: 90 TABLET | Refills: 3 | Status: SHIPPED | OUTPATIENT
Start: 2018-09-04 | End: 2018-12-26 | Stop reason: SDUPTHER

## 2018-09-04 RX ORDER — INSULIN GLARGINE 100 [IU]/ML
95 INJECTION, SOLUTION SUBCUTANEOUS EVERY 12 HOURS
Qty: 3 ML | Refills: 11 | Status: SHIPPED | OUTPATIENT
Start: 2018-09-04 | End: 2019-02-06 | Stop reason: SDUPTHER

## 2018-09-04 RX ORDER — FERROUS SULFATE 325(65) MG
325 TABLET ORAL
Qty: 30 TABLET | Refills: 3 | Status: SHIPPED | OUTPATIENT
Start: 2018-09-04 | End: 2018-12-26 | Stop reason: SDUPTHER

## 2018-09-04 RX ORDER — BUDESONIDE AND FORMOTEROL FUMARATE DIHYDRATE 160; 4.5 UG/1; UG/1
2 AEROSOL RESPIRATORY (INHALATION)
Qty: 10.2 G | Refills: 6 | Status: SHIPPED | OUTPATIENT
Start: 2018-09-04 | End: 2019-02-06 | Stop reason: SDUPTHER

## 2018-09-04 RX ORDER — IPRATROPIUM BROMIDE AND ALBUTEROL SULFATE 2.5; .5 MG/3ML; MG/3ML
3 SOLUTION RESPIRATORY (INHALATION) 4 TIMES DAILY
Qty: 360 ML | Refills: 0 | Status: SHIPPED | OUTPATIENT
Start: 2018-09-04 | End: 2019-02-06 | Stop reason: SDUPTHER

## 2018-09-04 RX ORDER — ALBUTEROL SULFATE 90 UG/1
2 AEROSOL, METERED RESPIRATORY (INHALATION) EVERY 4 HOURS PRN
Qty: 1 INHALER | Refills: 0 | Status: SHIPPED | OUTPATIENT
Start: 2018-09-04 | End: 2019-02-06 | Stop reason: SDUPTHER

## 2018-09-04 RX ORDER — NITROGLYCERIN 0.4 MG/1
0.4 TABLET SUBLINGUAL AS NEEDED
Qty: 30 TABLET | Refills: 3 | Status: SHIPPED | OUTPATIENT
Start: 2018-09-04 | End: 2019-08-08 | Stop reason: SDUPTHER

## 2018-09-04 RX ORDER — PROMETHAZINE HYDROCHLORIDE 25 MG/1
25 TABLET ORAL EVERY 6 HOURS PRN
Qty: 30 TABLET | Refills: 0 | Status: SHIPPED | OUTPATIENT
Start: 2018-09-04 | End: 2018-12-26 | Stop reason: SDUPTHER

## 2018-09-04 RX ORDER — CETIRIZINE HYDROCHLORIDE 10 MG/1
10 TABLET ORAL DAILY
Qty: 30 TABLET | Refills: 3 | Status: SHIPPED | OUTPATIENT
Start: 2018-09-04 | End: 2018-12-26 | Stop reason: SDUPTHER

## 2018-09-04 NOTE — PROGRESS NOTES
Subjective:     Yamileth Slater is a 58 y.o. female who presents for follow up on diabetes.  She is averaging 100-150. She feels she is doing quite well.    She is having sore throat, rhinorrhea, non-productive cough, and some nasal congestion at times.      The following portions of the patient's history were reviewed and updated as appropriate: allergies, current medications, past family history, past medical history, past social history, past surgical history and problem list.      Past Medical Hx:  Past Medical History:   Diagnosis Date   • Anxiety states    • Asthma    • Carotid artery stenosis     DWIGHT 0-15%, LICA 0-15%. LICA stent 5/2014.   • Chronic folliculitis    • Chronic obstructive lung disease (CMS/HCC)    • Coronary arteriosclerosis    • Diabetes mellitus (CMS/HCC)     no retinopathy; A1C 9.1      • Dyslipidemia    • Essential hypertension    • Excoriated eczema     asteosis/keratosis   • GERD (gastroesophageal reflux disease)    • History of colon polyps    • Hypertensive disorder    • Kidney stone    • Mixed hyperlipidemia    • Morbid obesity due to excess calories (CMS/[facility])     BMI 44.5   • Neurologic disorder associated with diabetes mellitus (CMS/HCC)    • Non-healing surgical wound     LLE EVHa   • Peripheral vascular disease (CMS/HCC)    • Sleep apnea    • Tobacco dependence syndrome     1/2ppd      • Type 2 diabetes mellitus (CMS/HCC)    • Vitamin D deficiency        Past Surgical Hx:  Past Surgical History:   Procedure Laterality Date   • CARDIAC CATHETERIZATION  08/09/2013    Severe multivessel CAD with critical lesions noted in the RCA, obtuse marginal two, ramus intermemdious, and LAD as described above. Normal LV systolic function with no wall motion abnormality.    • CARDIAC CATHETERIZATION  05/27/2014     LEFT Carotid Stent    • CAROTID STENT Left 05/27/2014   • COLONOSCOPY  05/02/2016    Normal colon.Diverticulosis in the sigmoid colon.No specimens collected.    • CORONARY  ARTERY BYPASS GRAFT  11/15/2013    CABG x 3 with LIMA to LAD and coronary endarterectomy to LAD, SVG to Ramus intermedius branch and SVG to PDA.    • ENDOSCOPY  09/23/2015     Esophageal stricture present.Normal stomach.Normal duodenum.    • ENDOSCOPY  11/16/2015    w/ dilatation - Esophageal stricture present,Dilatation performed.Normal stomach and duodenum.    • INCISION AND DRAINAGE ABSCESS  01/02/2016    Incision and drainage of right perineal abscess.    • INCISION AND DRAINAGE ABSCESS  02/18/2014    Incision and Drainage of abscess of left lower leg    • OTHER SURGICAL HISTORY  01/25/2016    DESTRUCTION OF BENIGN LESION      • OTHER SURGICAL HISTORY  04/18/2014    ear/neck - L attempted CEA, Neck Dissection        Health Maintenance:  Health Maintenance   Topic Date Due   • ZOSTER VACCINE (1 of 2) 03/10/2009   • MEDICARE ANNUAL WELLNESS  08/24/2016   • DIABETIC EYE EXAM  08/24/2016   • INFLUENZA VACCINE  08/01/2018   • HEMOGLOBIN A1C  10/04/2018   • URINE MICROALBUMIN  10/05/2018   • DIABETIC FOOT EXAM  11/29/2018   • MAMMOGRAM  01/04/2019   • LIPID PANEL  04/04/2019   • PAP SMEAR  01/04/2020   • COLONOSCOPY  05/02/2021   • TDAP/TD VACCINES (2 - Td) 11/10/2024   • PNEUMOCOCCAL VACCINE (19-64 MEDIUM RISK)  Completed   • HEPATITIS C SCREENING  Completed       Current Meds:    Current Outpatient Prescriptions:   •  albuterol (ACCUNEB) 0.63 MG/3ML nebulizer solution, Take 3 mL by nebulization Every 6 (Six) Hours As Needed for Wheezing., Disp: 20 vial, Rfl: 0  •  albuterol (PROVENTIL HFA;VENTOLIN HFA) 108 (90 Base) MCG/ACT inhaler, Inhale 2 puffs Every 4 (Four) Hours As Needed for Wheezing., Disp: 1 inhaler, Rfl: 0  •  atorvastatin (LIPITOR) 20 MG tablet, Take 1 tablet by mouth Daily., Disp: 30 tablet, Rfl: 3  •  Blood Glucose Monitoring Suppl ([employer] BLOOD GLUCOSE METER) W/DEVICE kit, 1 each 3 (Three) Times a Day., Disp: 1 kit, Rfl: 3  •  cetirizine (zyrTEC) 10 MG tablet, TAKE 1 TABLET BY MOUTH DAILY., Disp: 30  tablet, Rfl: 3  •  clobetasol (CLOBEX) 0.05 % lotion, apply to skin bid x 1-2w then prn. generic, Disp: 118 mL, Rfl: 0  •  clopidogrel (PLAVIX) 75 MG tablet, Take 1 tablet by mouth Daily., Disp: 30 tablet, Rfl: 3  •  cyclobenzaprine (FLEXERIL) 10 MG tablet, TAKE 1 TABLET BY MOUTH 2 (TWO) TIMES A DAY AS NEEDED FOR MUSCLE SPASMS., Disp: 60 tablet, Rfl: 2  •  Empagliflozin (JARDIANCE) 10 MG tablet, Take 10 mg by mouth Every Morning., Disp: 30 tablet, Rfl: 3  •  ferrous sulfate (FERROUSUL) 325 (65 FE) MG tablet, Take 1 tablet by mouth Daily With Breakfast., Disp: 30 tablet, Rfl: 3  •  gabapentin (NEURONTIN) 800 MG tablet, Take 1 tablet by mouth 3 (Three) Times a Day. For neuropathy, Disp: 90 tablet, Rfl: 3  •  glucose blood test strip, Use as instructed, Disp: 100 each, Rfl: 12  •  ibuprofen (ADVIL,MOTRIN) 800 MG tablet, Take 1 tablet by mouth Every 6 (Six) Hours As Needed for Mild Pain ., Disp: 20 tablet, Rfl: 0  •  insulin glargine (LANTUS) 100 UNIT/ML injection, Inject 95 Units under the skin Every 12 (Twelve) Hours., Disp: 1 each, Rfl: 11  •  Insulin Lispro (HUMALOG KWIKPEN) 100 UNIT/ML solution pen-injector, Inject 45 Units under the skin 3 (Three) Times a Day Before Meals., Disp: 60 mL, Rfl: 11  •  Insulin Pen Needle (NOVOFINE) 30G X 8 MM misc, As directed 4 times daily, Disp: 100 each, Rfl: 11  •  ipratropium-albuterol (DUO-NEB) 0.5-2.5 mg/mL nebulizer, Take 3 mL by nebulization 4 (Four) Times a Day. ICD10 code J96.01, Disp: 360 mL, Rfl: 0  •  LANTUS SOLOSTAR 100 UNIT/ML injection pen, INJECT 95 UNITS UNDER THE SKIN EVERY 12  TWELVE  HOURS, Disp: , Rfl: 11  •  Liraglutide (VICTOZA) 18 MG/3ML solution pen-injector injection, Inject 1.2 mg under the skin Every Morning., Disp: 9 mL, Rfl: 3  •  lisinopril (PRINIVIL,ZESTRIL) 10 MG tablet, Take 1 tablet by mouth Daily., Disp: 30 tablet, Rfl: 3  •  meloxicam (MOBIC) 7.5 MG tablet, Take 1 tablet by mouth Daily. Start medication on 8/24/18, Disp: 30 tablet, Rfl: 1  •   metFORMIN ER (GLUCOPHAGE-XR) 500 MG 24 hr tablet, TAKE 2 TABLET BY MOUTH 2 (TWO) TIMES A DAY WITH MEALS., Disp: 120 tablet, Rfl: 3  •  methocarbamol (ROBAXIN) 500 MG tablet, Take 1 tablet by mouth 4 (Four) Times a Day., Disp: 20 tablet, Rfl: 0  •  methocarbamol (ROBAXIN) 750 MG tablet, Take 1 tablet by mouth 4 (Four) Times a Day As Needed for Muscle Spasms., Disp: 30 tablet, Rfl: 1  •  methylPREDNISolone (MEDROL) 4 MG tablet, Take 1 tablet by mouth Daily. Please follow instructions on medrol dose zachary, Disp: 21 tablet, Rfl: 0  •  metoprolol tartrate (LOPRESSOR) 50 MG tablet, Take 1 tablet by mouth Every 12 (Twelve) Hours., Disp: 60 tablet, Rfl: 3  •  montelukast (SINGULAIR) 10 MG tablet, Take 1 tablet by mouth Every Night., Disp: 30 tablet, Rfl: 3  •  nitroglycerin (NITROSTAT) 0.4 MG SL tablet, Place 1 tablet under the tongue As Needed for Chest Pain. Take no more than 3 doses in 15 minutes., Disp: 30 tablet, Rfl: 3  •  nystatin (MYCOSTATIN) 854911 UNIT/GM powder, Apply  topically to the appropriate area as directed 3 (Three) Times a Day., Disp: 15 g, Rfl: 1  •  oxyCODONE-acetaminophen (PERCOCET) 5-325 MG per tablet, Take 1 tablet by mouth Every 6 (Six) Hours As Needed for Severe Pain ., Disp: 12 tablet, Rfl: 0  •  pantoprazole (PROTONIX) 40 MG EC tablet, Take 1 tablet by mouth Daily., Disp: 30 tablet, Rfl: 3  •  potassium chloride (K-DUR) 10 MEQ CR tablet, TAKE 1 TABLET BY MOUTH EVERY NIGHT., Disp: 30 tablet, Rfl: 3  •  promethazine (PHENERGAN) 25 MG tablet, Take 1 tablet by mouth Every 6 (Six) Hours As Needed for Nausea or Vomiting., Disp: 30 tablet, Rfl: 0  •  SYMBICORT 160-4.5 MCG/ACT inhaler, INHALE 2 PUFFS INTO LUNGS 2 (TWO) TIMES A DAY-RINSE MOUTH AFTER USE, Disp: 10.2 g, Rfl: 6    Allergies:  Other    Family Hx:  Family History   Problem Relation Age of Onset   • Coronary artery disease Other    • Diabetes Other    • Heart disease Other    • Hypertension Other         Social History:  Social History  "    Social History   • Marital status:      Spouse name: wesley dumont   • Number of children: N/A   • Years of education: N/A     Occupational History   • Not on file.     Social History Main Topics   • Smoking status: Current Every Day Smoker     Packs/day: 0.25     Types: Cigarettes   • Smokeless tobacco: Never Used   • Alcohol use No   • Drug use: No   • Sexual activity: Not on file     Other Topics Concern   • Not on file     Social History Narrative   • No narrative on file       Review of Systems  Review of Systems   Constitutional: Negative for activity change, appetite change, fatigue and fever.   HENT: Positive for congestion, rhinorrhea and sore throat. Negative for ear pain.    Eyes: Negative for pain and visual disturbance.   Respiratory: Positive for cough. Negative for shortness of breath.    Cardiovascular: Negative for chest pain and palpitations.   Gastrointestinal: Negative for abdominal pain and nausea.   Endocrine: Negative for cold intolerance and heat intolerance.   Genitourinary: Negative for difficulty urinating and dysuria.   Musculoskeletal: Negative for arthralgias and gait problem.   Skin: Negative for color change and rash.   Neurological: Negative for dizziness, weakness and headaches.   Hematological: Negative for adenopathy. Does not bruise/bleed easily.   Psychiatric/Behavioral: Negative for agitation, confusion and sleep disturbance.       Objective:     /82   Pulse 85   Ht 157.5 cm (62\")   Wt 121 kg (266 lb 1 oz)   LMP  (LMP Unknown)   SpO2 95%   Breastfeeding? No   BMI 48.66 kg/m²   Physical Exam   Constitutional: She is oriented to person, place, and time. She appears well-developed and well-nourished.   HENT:   Head: Normocephalic and atraumatic.   Right Ear: Hearing, tympanic membrane, external ear and ear canal normal.   Left Ear: Hearing, tympanic membrane, external ear and ear canal normal.   Nose: Nose normal.   Mouth/Throat: Oropharynx is clear and " moist.   Eyes: Pupils are equal, round, and reactive to light. Conjunctivae and EOM are normal.   Neck: Normal range of motion. Neck supple.   Cardiovascular: Normal rate, regular rhythm and normal heart sounds.    Pulmonary/Chest: Effort normal and breath sounds normal.   Abdominal: Soft. Bowel sounds are normal. She exhibits no distension. There is no tenderness.   Musculoskeletal: Normal range of motion.   Neurological: She is alert and oriented to person, place, and time.   Skin: Skin is warm and dry.   Psychiatric: She has a normal mood and affect. Her speech is normal and behavior is normal. Cognition and memory are normal.       Lab Review  Results for orders placed or performed in visit on 08/22/18   Comprehensive metabolic panel   Result Value Ref Range    Glucose 71 60 - 100 mg/dL    BUN 16 7 - 21 mg/dL    Creatinine 0.90 0.50 - 1.00 mg/dL    Sodium 138 137 - 145 mmol/L    Potassium 4.3 3.5 - 5.1 mmol/L    Chloride 102 95 - 110 mmol/L    CO2 27.0 22.0 - 31.0 mmol/L    Calcium 8.8 8.4 - 10.2 mg/dL    Total Protein 7.7 6.3 - 8.6 g/dL    Albumin 3.70 3.40 - 4.80 g/dL    ALT (SGPT) 31 9 - 52 U/L    AST (SGOT) 68 (H) 14 - 36 U/L    Alkaline Phosphatase 141 (H) 38 - 126 U/L    Total Bilirubin 0.3 0.2 - 1.3 mg/dL    eGFR Non  Amer 64 51 - 120 mL/min/1.73    Globulin 4.0 (H) 2.3 - 3.5 gm/dL    A/G Ratio 0.9 (L) 1.1 - 1.8 g/dL    BUN/Creatinine Ratio 17.8 7.0 - 25.0    Anion Gap 9.0 5.0 - 15.0 mmol/L            Assessment:     Yamileth was seen today for back pain.    Diagnoses and all orders for this visit:    Chronic obstructive pulmonary disease, unspecified COPD type (CMS/HCC)  -     budesonide-formoterol (SYMBICORT) 160-4.5 MCG/ACT inhaler; Inhale 2 puffs 2 (Two) Times a Day.    Other iron deficiency anemia  -     ferrous sulfate (FERROUSUL) 325 (65 FE) MG tablet; Take 1 tablet by mouth Daily With Breakfast.    Uncontrolled type 2 diabetes mellitus with diabetic polyneuropathy, with long-term current use  of insulin (CMS/[facility])  -     gabapentin (NEURONTIN) 800 MG tablet; Take 1 tablet by mouth 3 (Three) Times a Day. For neuropathy    Chronic obstructive pulmonary disease with acute lower respiratory infection (CMS/[facility])  -     ipratropium-albuterol (DUO-NEB) 0.5-2.5 mg/3 ml nebulizer; Take 3 mL by nebulization 4 (Four) Times a Day. ICD10 code J96.01    Mild persistent asthma without complication  -     ipratropium-albuterol (DUO-NEB) 0.5-2.5 mg/3 ml nebulizer; Take 3 mL by nebulization 4 (Four) Times a Day. ICD10 code J96.01    Uncontrolled type 2 diabetes mellitus with complication, with long-term current use of insulin (CMS/[facility])  -     Liraglutide (VICTOZA) 18 MG/3ML solution pen-injector injection; Inject 1.2 mg under the skin into the appropriate area as directed Every Morning.    Other orders  -     promethazine (PHENERGAN) 25 MG tablet; Take 1 tablet by mouth Every 6 (Six) Hours As Needed for Nausea or Vomiting.  -     albuterol (PROVENTIL HFA;VENTOLIN HFA) 108 (90 Base) MCG/ACT inhaler; Inhale 2 puffs Every 4 (Four) Hours As Needed for Wheezing.  -     albuterol (ACCUNEB) 0.63 MG/3ML nebulizer solution; Take 3 mL by nebulization Every 6 (Six) Hours As Needed for Wheezing.  -     cetirizine (zyrTEC) 10 MG tablet; Take 1 tablet by mouth Daily.  -     betamethasone valerate (VALISONE) 0.1 % cream; Apply  topically to the appropriate area as directed Daily.  -     LANTUS SOLOSTAR 100 UNIT/ML injection pen;   -     Insulin Pen Needle (NOVOFINE) 30G X 8 MM misc; As directed 4 times daily  -     metoprolol tartrate (LOPRESSOR) 50 MG tablet; Take 1 tablet by mouth Every 12 (Twelve) Hours.  -     nitroglycerin (NITROSTAT) 0.4 MG SL tablet; Place 1 tablet under the tongue As Needed for Chest Pain. Take no more than 3 doses in 15 minutes.        Plan:     I have seen and examined the patient.  I have reviewed the notes, assessments, and/or procedures performed by Nirmal Calvillo MD , I concur with her/his  documentation and assessment and plan for Yamileth Slater.      This document has been electronically signed by Pauline Martinez MD on September 4, 2018 11:53 AM

## 2018-09-05 NOTE — PROGRESS NOTES
Subjective   Yamileth Slater is a 58 y.o. female who presents for follow up for diabetic neuropathy and gabapentin refill. She asserts that her neuropathy is adequately controlled with her gabapentin. She has intact sensation in hands and feet. In addition she complains of congestion, headache, rhinorrhea, and sore throat for 2 days. She denies any fever or cough. Her  had the same symptoms recently and is currently recovering.    Mamadou reviewed, request # [id number].      Past Medical Hx:  Past Medical History:   Diagnosis Date   • Anxiety states    • Asthma    • Carotid artery stenosis     DWIGHT 0-15%, LICA 0-15%. LICA stent 5/2014.   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 Normal LV systolic function with no wall motion abnormality.    • CARDIAC CATHETERIZATION  05/27/2014     LEFT Carotid Stent    • CAROTID STENT Left 05/27/2014   • COLONOSCOPY  05/02/2016    Normal colon.Diverticulosis in the sigmoid colon.No specimens collected.    •  CORONARY ARTERY BYPASS GRAFT  11/15/2013    CABG x 3 with LIMA to LAD and coronary endarterectomy to LAD, SVG to Ramus intermedius branch and SVG to PDA.    • ENDOSCOPY  09/23/2015     Esophageal stricture present.Normal stomach.Normal duodenum.    • ENDOSCOPY  11/16/2015    w/ dilatation - Esophageal stricture present,Dilatation performed.Normal stomach and duodenum.    • INCISION AND DRAINAGE ABSCESS  01/02/2016    Incision and drainage of right perineal abscess.    • INCISION AND DRAINAGE ABSCESS  02/18/2014    Incision and Drainage of abscess of left lower leg    • OTHER SURGICAL HISTORY  01/25/2016    DESTRUCTION OF BENIGN LESION      • OTHER SURGICAL HISTORY  04/18/2014    ear/neck - L attempted CEA, Neck Dissection        Health Maintenance:  Health Maintenance   Topic Date Due   • ZOSTER VACCINE (1 of 2) 03/10/2009   • MEDICARE ANNUAL WELLNESS  08/24/2016   • DIABETIC EYE EXAM  08/24/2016   • INFLUENZA VACCINE  08/01/2018   • HEMOGLOBIN A1C  10/04/2018   • URINE MICROALBUMIN  10/05/2018   • DIABETIC FOOT EXAM  11/29/2018   • MAMMOGRAM  01/04/2019   • LIPID PANEL  04/04/2019   • PAP SMEAR  01/04/2020   • COLONOSCOPY  05/02/2021   • TDAP/TD VACCINES (2 - Td) 11/10/2024   • PNEUMOCOCCAL VACCINE (19-64 MEDIUM RISK)  Completed   • HEPATITIS C SCREENING  Completed       Current Meds:    Current Outpatient Prescriptions:   •  albuterol (ACCUNEB) 0.63 MG/3ML nebulizer solution, Take 3 mL by nebulization Every 6 (Six) Hours As Needed for Wheezing., Disp: 20 vial, Rfl: 0  •  albuterol (PROVENTIL HFA;VENTOLIN HFA) 108 (90 Base) MCG/ACT inhaler, Inhale 2 puffs Every 4 (Four) Hours As Needed for Wheezing., Disp: 1 inhaler, Rfl: 0  •  atorvastatin (LIPITOR) 20 MG tablet, Take 1 tablet by mouth Daily., Disp: 30 tablet, Rfl: 3  •  betamethasone valerate (VALISONE) 0.1 % cream, Apply  topically to the appropriate area as directed Daily., Disp: 45 g, Rfl: 2  •  Blood Glucose Monitoring Suppl ([employer] BLOOD GLUCOSE METER) W/DEVICE  kit, 1 each 3 (Three) Times a Day., Disp: 1 kit, Rfl: 3  •  budesonide-formoterol (SYMBICORT) 160-4.5 MCG/ACT inhaler, Inhale 2 puffs 2 (Two) Times a Day., Disp: 10.2 g, Rfl: 6  •  cetirizine (zyrTEC) 10 MG tablet, Take 1 tablet by mouth Daily., Disp: 30 tablet, Rfl: 3  •  clopidogrel (PLAVIX) 75 MG tablet, Take 1 tablet by mouth Daily., Disp: 30 tablet, Rfl: 3  •  cyclobenzaprine (FLEXERIL) 10 MG tablet, TAKE 1 TABLET BY MOUTH 2 (TWO) TIMES A DAY AS NEEDED FOR MUSCLE SPASMS., Disp: 60 tablet, Rfl: 2  •  Empagliflozin (JARDIANCE) 10 MG tablet, Take 10 mg by mouth Every Morning., Disp: 30 tablet, Rfl: 3  •  ferrous sulfate (FERROUSUL) 325 (65 FE) MG tablet, Take 1 tablet by mouth Daily With Breakfast., Disp: 30 tablet, Rfl: 3  •  gabapentin (NEURONTIN) 800 MG tablet, Take 1 tablet by mouth 3 (Three) Times a Day. For neuropathy, Disp: 90 tablet, Rfl: 3  •  glucose blood test strip, Use as instructed, Disp: 100 each, Rfl: 12  •  ibuprofen (ADVIL,MOTRIN) 800 MG tablet, Take 1 tablet by mouth Every 6 (Six) Hours As Needed for Mild Pain ., Disp: 20 tablet, Rfl: 0  •  insulin glargine (LANTUS) 100 UNIT/ML injection, Inject 95 Units under the skin Every 12 (Twelve) Hours., Disp: 1 each, Rfl: 11  •  Insulin Lispro (HUMALOG KWIKPEN) 100 UNIT/ML solution pen-injector, Inject 45 Units under the skin 3 (Three) Times a Day Before Meals., Disp: 60 mL, Rfl: 11  •  Insulin Pen Needle (NOVOFINE) 30G X 8 MM misc, As directed 4 times daily, Disp: 100 each, Rfl: 11  •  ipratropium-albuterol (DUO-NEB) 0.5-2.5 mg/3 ml nebulizer, Take 3 mL by nebulization 4 (Four) Times a Day. ICD10 code J96.01, Disp: 360 mL, Rfl: 0  •  LANTUS SOLOSTAR 100 UNIT/ML injection pen, Inject 95 Units under the skin into the appropriate area as directed Every 12 (Twelve) Hours., Disp: 3 mL, Rfl: 11  •  Liraglutide (VICTOZA) 18 MG/3ML solution pen-injector injection, Inject 1.2 mg under the skin into the appropriate area as directed Every Morning., Disp: 9 mL,  Rfl: 3  •  lisinopril (PRINIVIL,ZESTRIL) 10 MG tablet, Take 1 tablet by mouth Daily., Disp: 30 tablet, Rfl: 3  •  meloxicam (MOBIC) 7.5 MG tablet, Take 1 tablet by mouth Daily. Start medication on 8/24/18, Disp: 30 tablet, Rfl: 1  •  metFORMIN ER (GLUCOPHAGE-XR) 500 MG 24 hr tablet, TAKE 2 TABLET BY MOUTH 2 (TWO) TIMES A DAY WITH MEALS., Disp: 120 tablet, Rfl: 3  •  methocarbamol (ROBAXIN) 500 MG tablet, Take 1 tablet by mouth 4 (Four) Times a Day., Disp: 20 tablet, Rfl: 0  •  methocarbamol (ROBAXIN) 750 MG tablet, Take 1 tablet by mouth 4 (Four) Times a Day As Needed for Muscle Spasms., Disp: 30 tablet, Rfl: 1  •  methylPREDNISolone (MEDROL) 4 MG tablet, Take 1 tablet by mouth Daily. Please follow instructions on medrol dose zachary, Disp: 21 tablet, Rfl: 0  •  metoprolol tartrate (LOPRESSOR) 50 MG tablet, Take 1 tablet by mouth Every 12 (Twelve) Hours., Disp: 60 tablet, Rfl: 3  •  montelukast (SINGULAIR) 10 MG tablet, Take 1 tablet by mouth Every Night., Disp: 30 tablet, Rfl: 3  •  nitroglycerin (NITROSTAT) 0.4 MG SL tablet, Place 1 tablet under the tongue As Needed for Chest Pain. Take no more than 3 doses in 15 minutes., Disp: 30 tablet, Rfl: 3  •  nystatin (MYCOSTATIN) 507120 UNIT/GM powder, Apply  topically to the appropriate area as directed 3 (Three) Times a Day., Disp: 15 g, Rfl: 1  •  oxyCODONE-acetaminophen (PERCOCET) 5-325 MG per tablet, Take 1 tablet by mouth Every 6 (Six) Hours As Needed for Severe Pain ., Disp: 12 tablet, Rfl: 0  •  pantoprazole (PROTONIX) 40 MG EC tablet, Take 1 tablet by mouth Daily., Disp: 30 tablet, Rfl: 3  •  potassium chloride (K-DUR) 10 MEQ CR tablet, TAKE 1 TABLET BY MOUTH EVERY NIGHT., Disp: 30 tablet, Rfl: 3  •  promethazine (PHENERGAN) 25 MG tablet, Take 1 tablet by mouth Every 6 (Six) Hours As Needed for Nausea or Vomiting., Disp: 30 tablet, Rfl: 0    Allergies:  Other    Family Hx:  Family History   Problem Relation Age of Onset   • Coronary artery disease Other    • Diabetes  "Other    • Heart disease Other    • Hypertension Other         Social History:  Social History     Social History   • Marital status:      Spouse name: wesley dumont   • Number of children: N/A   • Years of education: N/A     Occupational History   • Not on file.     Social History Main Topics   • Smoking status: Current Every Day Smoker     Packs/day: 0.25     Types: Cigarettes   • Smokeless tobacco: Never Used   • Alcohol use No   • Drug use: No   • Sexual activity: Not on file     Other Topics Concern   • Not on file     Social History Narrative   • No narrative on file       Review of Systems  Review of Systems   Constitutional: Negative for fatigue and fever.   HENT: Positive for congestion, rhinorrhea and sore throat. Negative for hearing loss, sinus pain and trouble swallowing.    Respiratory: Negative for cough, chest tightness and shortness of breath.    Cardiovascular: Negative for chest pain and leg swelling.   Gastrointestinal: Negative for abdominal distention and abdominal pain.   Genitourinary: Negative for difficulty urinating and dysuria.   Musculoskeletal: Positive for back pain. Negative for arthralgias and myalgias.   Neurological: Positive for headaches. Negative for light-headedness.   Psychiatric/Behavioral: Negative for agitation and behavioral problems.            Objective:     /82   Pulse 85   Ht 157.5 cm (62\")   Wt 121 kg (266 lb 1 oz)   LMP  (LMP Unknown)   SpO2 95%   Breastfeeding? No   BMI 48.66 kg/m²       Physical Exam   Constitutional: She is oriented to person, place, and time. She appears well-developed and well-nourished.   HENT:   Head: Normocephalic and atraumatic.   Right Ear: Hearing, tympanic membrane, external ear and ear canal normal. No drainage or tenderness.   Left Ear: Hearing, tympanic membrane, external ear and ear canal normal. No drainage or tenderness.   Mouth/Throat: Oropharynx is clear and moist.   Eyes: Pupils are equal, round, and reactive " to light. EOM are normal.   Cardiovascular: Normal rate, regular rhythm and normal heart sounds.  Exam reveals no gallop and no friction rub.    No murmur heard.  Pulmonary/Chest: Effort normal and breath sounds normal. No respiratory distress.   Abdominal: Soft. Bowel sounds are normal. She exhibits no distension. There is no tenderness.   Musculoskeletal: Normal range of motion.   Neurological: She is alert and oriented to person, place, and time.   Skin: Skin is warm. No erythema.   Psychiatric: She has a normal mood and affect. Her behavior is normal.       Assessment/Plan:     1. Uncontrolled type 2 diabetes mellitus with complication, with long-term current use of insulin (CMS/[facility])  - Urine drug test performed  - Mamadou reviewed, request # [id number]  - Gabapentin refilled  - Diabetic eye exam referral ordered  - HgB A1c when return in 3 months.   2. Viral URI  - Symptomatic treatment.  - RTC if symptoms worsen.        Follow-up:     Return in about 3 months (around 12/4/2018).    Goals     • Fasting Blood Glucose             Barriers: compliance with diet      • Quit smoking / using tobacco           Preventative:    Vaccines Recommended at this visit:   Shingrix    Vaccines Received at this visit:  None    Screenings Recommended at this visit:  Diabetic Eye Exam    Screenings Ordered at this visit:  Diabetic Eye Exam    Smoking Status:  Patient is current smoker. Patient is not interested in smoking cessation.    Alcohol Intake:  Patient does not drink    Patient's Body mass index is 48.66 kg/m². BMI is above normal parameters. Recommendations include: exercise counseling.         RISK SCORE: 3           This document has been electronically signed by Nirmal Calvillo MD on September 4, 2018 9:15 PM

## 2018-09-10 ENCOUNTER — HOSPITAL ENCOUNTER (OUTPATIENT)
Facility: HOSPITAL | Age: 58
Setting detail: OBSERVATION
Discharge: HOME | End: 2018-09-11
Attending: EMERGENCY MEDICINE | Admitting: EMERGENCY MEDICINE

## 2018-09-10 ENCOUNTER — APPOINTMENT (OUTPATIENT)
Dept: GENERAL RADIOLOGY | Facility: HOSPITAL | Age: 58
End: 2018-09-10

## 2018-09-10 DIAGNOSIS — R07.9 CHEST PAIN, RULE OUT ACUTE MYOCARDIAL INFARCTION: Primary | ICD-10-CM

## 2018-09-10 DIAGNOSIS — J44.1 COPD EXACERBATION (HCC): ICD-10-CM

## 2018-09-10 LAB
ALBUMIN SERPL-MCNC: 3.8 G/DL (ref 3.4–4.8)
ALBUMIN/GLOB SERPL: 1 G/DL (ref 1.1–1.8)
ALP SERPL-CCNC: 152 U/L (ref 38–126)
ALT SERPL W P-5'-P-CCNC: 33 U/L (ref 9–52)
ANION GAP SERPL CALCULATED.3IONS-SCNC: 8 MMOL/L (ref 5–15)
AST SERPL-CCNC: 31 U/L (ref 14–36)
BASOPHILS # BLD AUTO: 0.05 10*3/MM3 (ref 0–0.2)
BASOPHILS NFR BLD AUTO: 0.5 % (ref 0–2)
BILIRUB SERPL-MCNC: 0.3 MG/DL (ref 0.2–1.3)
BUN BLD-MCNC: 16 MG/DL (ref 7–21)
BUN/CREAT SERPL: 16.8 (ref 7–25)
CALCIUM SPEC-SCNC: 8.7 MG/DL (ref 8.4–10.2)
CHLORIDE SERPL-SCNC: 103 MMOL/L (ref 95–110)
CO2 SERPL-SCNC: 27 MMOL/L (ref 22–31)
CREAT BLD-MCNC: 0.95 MG/DL (ref 0.5–1)
D-LACTATE SERPL-SCNC: 1.3 MMOL/L (ref 0.5–2)
D-LACTATE SERPL-SCNC: 2.1 MMOL/L (ref 0.5–2)
DEPRECATED RDW RBC AUTO: 50.4 FL (ref 36.4–46.3)
EOSINOPHIL # BLD AUTO: 0.39 10*3/MM3 (ref 0–0.7)
EOSINOPHIL NFR BLD AUTO: 3.6 % (ref 0–7)
ERYTHROCYTE [DISTWIDTH] IN BLOOD BY AUTOMATED COUNT: 16.8 % (ref 11.5–14.5)
GFR SERPL CREATININE-BSD FRML MDRD: 60 ML/MIN/1.73 (ref 51–120)
GLOBULIN UR ELPH-MCNC: 3.7 GM/DL (ref 2.3–3.5)
GLUCOSE BLD-MCNC: 118 MG/DL (ref 60–100)
HCT VFR BLD AUTO: 41 % (ref 35–45)
HGB BLD-MCNC: 13.4 G/DL (ref 12–15.5)
HOLD SPECIMEN: NORMAL
IMM GRANULOCYTES # BLD: 0.05 10*3/MM3 (ref 0–0.02)
IMM GRANULOCYTES NFR BLD: 0.5 % (ref 0–0.5)
LYMPHOCYTES # BLD AUTO: 4.02 10*3/MM3 (ref 0.6–4.2)
LYMPHOCYTES NFR BLD AUTO: 36.8 % (ref 10–50)
MCH RBC QN AUTO: 27.3 PG (ref 26.5–34)
MCHC RBC AUTO-ENTMCNC: 32.7 G/DL (ref 31.4–36)
MCV RBC AUTO: 83.5 FL (ref 80–98)
MONOCYTES # BLD AUTO: 0.56 10*3/MM3 (ref 0–0.9)
MONOCYTES NFR BLD AUTO: 5.1 % (ref 0–12)
NEUTROPHILS # BLD AUTO: 5.85 10*3/MM3 (ref 2–8.6)
NEUTROPHILS NFR BLD AUTO: 53.5 % (ref 37–80)
NT-PROBNP SERPL-MCNC: 150 PG/ML (ref 0–900)
PLATELET # BLD AUTO: 240 10*3/MM3 (ref 150–450)
PMV BLD AUTO: 8.9 FL (ref 8–12)
POTASSIUM BLD-SCNC: 3.8 MMOL/L (ref 3.5–5.1)
PROT SERPL-MCNC: 7.5 G/DL (ref 6.3–8.6)
RBC # BLD AUTO: 4.91 10*6/MM3 (ref 3.77–5.16)
SODIUM BLD-SCNC: 138 MMOL/L (ref 137–145)
TROPONIN I SERPL-MCNC: <0.012 NG/ML
WBC NRBC COR # BLD: 10.92 10*3/MM3 (ref 3.2–9.8)
WHOLE BLOOD HOLD SPECIMEN: NORMAL
WHOLE BLOOD HOLD SPECIMEN: NORMAL

## 2018-09-10 PROCEDURE — 93010 ELECTROCARDIOGRAM REPORT: CPT | Performed by: INTERNAL MEDICINE

## 2018-09-10 PROCEDURE — 25010000002 ONDANSETRON PER 1 MG: Performed by: EMERGENCY MEDICINE

## 2018-09-10 PROCEDURE — 36415 COLL VENOUS BLD VENIPUNCTURE: CPT | Performed by: EMERGENCY MEDICINE

## 2018-09-10 PROCEDURE — 84484 ASSAY OF TROPONIN QUANT: CPT | Performed by: EMERGENCY MEDICINE

## 2018-09-10 PROCEDURE — 83605 ASSAY OF LACTIC ACID: CPT | Performed by: EMERGENCY MEDICINE

## 2018-09-10 PROCEDURE — 93005 ELECTROCARDIOGRAM TRACING: CPT | Performed by: EMERGENCY MEDICINE

## 2018-09-10 PROCEDURE — 99284 EMERGENCY DEPT VISIT MOD MDM: CPT

## 2018-09-10 PROCEDURE — 71046 X-RAY EXAM CHEST 2 VIEWS: CPT

## 2018-09-10 PROCEDURE — G0378 HOSPITAL OBSERVATION PER HR: HCPCS

## 2018-09-10 PROCEDURE — 80053 COMPREHEN METABOLIC PANEL: CPT | Performed by: EMERGENCY MEDICINE

## 2018-09-10 PROCEDURE — 84484 ASSAY OF TROPONIN QUANT: CPT | Performed by: FAMILY MEDICINE

## 2018-09-10 PROCEDURE — 94640 AIRWAY INHALATION TREATMENT: CPT

## 2018-09-10 PROCEDURE — 83880 ASSAY OF NATRIURETIC PEPTIDE: CPT | Performed by: EMERGENCY MEDICINE

## 2018-09-10 PROCEDURE — 96375 TX/PRO/DX INJ NEW DRUG ADDON: CPT

## 2018-09-10 PROCEDURE — 83036 HEMOGLOBIN GLYCOSYLATED A1C: CPT | Performed by: FAMILY MEDICINE

## 2018-09-10 PROCEDURE — 25010000002 METHYLPREDNISOLONE PER 125 MG: Performed by: EMERGENCY MEDICINE

## 2018-09-10 PROCEDURE — 83735 ASSAY OF MAGNESIUM: CPT | Performed by: FAMILY MEDICINE

## 2018-09-10 PROCEDURE — 25010000002 MORPHINE PER 10 MG: Performed by: EMERGENCY MEDICINE

## 2018-09-10 PROCEDURE — 85025 COMPLETE CBC W/AUTO DIFF WBC: CPT | Performed by: EMERGENCY MEDICINE

## 2018-09-10 PROCEDURE — 96374 THER/PROPH/DIAG INJ IV PUSH: CPT

## 2018-09-10 RX ORDER — ONDANSETRON 4 MG/1
4 TABLET, ORALLY DISINTEGRATING ORAL EVERY 6 HOURS PRN
Status: DISCONTINUED | OUTPATIENT
Start: 2018-09-10 | End: 2018-09-11 | Stop reason: HOSPADM

## 2018-09-10 RX ORDER — MONTELUKAST SODIUM 10 MG/1
10 TABLET ORAL NIGHTLY
Status: DISCONTINUED | OUTPATIENT
Start: 2018-09-11 | End: 2018-09-11 | Stop reason: HOSPADM

## 2018-09-10 RX ORDER — NYSTATIN 100000 [USP'U]/G
POWDER TOPICAL 3 TIMES DAILY
Status: DISCONTINUED | OUTPATIENT
Start: 2018-09-11 | End: 2018-09-11 | Stop reason: HOSPADM

## 2018-09-10 RX ORDER — METHYLPREDNISOLONE SODIUM SUCCINATE 125 MG/2ML
125 INJECTION, POWDER, LYOPHILIZED, FOR SOLUTION INTRAMUSCULAR; INTRAVENOUS ONCE
Status: COMPLETED | OUTPATIENT
Start: 2018-09-10 | End: 2018-09-10

## 2018-09-10 RX ORDER — ONDANSETRON 4 MG/1
4 TABLET, FILM COATED ORAL EVERY 6 HOURS PRN
Status: DISCONTINUED | OUTPATIENT
Start: 2018-09-10 | End: 2018-09-11 | Stop reason: HOSPADM

## 2018-09-10 RX ORDER — FERROUS SULFATE TAB EC 324 MG (65 MG FE EQUIVALENT) 324 (65 FE) MG
324 TABLET DELAYED RESPONSE ORAL
Status: DISCONTINUED | OUTPATIENT
Start: 2018-09-11 | End: 2018-09-11 | Stop reason: HOSPADM

## 2018-09-10 RX ORDER — ASPIRIN 325 MG
325 TABLET ORAL ONCE
Status: COMPLETED | OUTPATIENT
Start: 2018-09-10 | End: 2018-09-10

## 2018-09-10 RX ORDER — SENNA AND DOCUSATE SODIUM 50; 8.6 MG/1; MG/1
2 TABLET, FILM COATED ORAL NIGHTLY PRN
Status: DISCONTINUED | OUTPATIENT
Start: 2018-09-10 | End: 2018-09-11 | Stop reason: HOSPADM

## 2018-09-10 RX ORDER — SODIUM CHLORIDE 0.9 % (FLUSH) 0.9 %
1-10 SYRINGE (ML) INJECTION AS NEEDED
Status: DISCONTINUED | OUTPATIENT
Start: 2018-09-10 | End: 2018-09-11 | Stop reason: HOSPADM

## 2018-09-10 RX ORDER — DEXTROSE MONOHYDRATE 25 G/50ML
25 INJECTION, SOLUTION INTRAVENOUS
Status: DISCONTINUED | OUTPATIENT
Start: 2018-09-10 | End: 2018-09-11 | Stop reason: HOSPADM

## 2018-09-10 RX ORDER — NICOTINE 21 MG/24HR
1 PATCH, TRANSDERMAL 24 HOURS TRANSDERMAL EVERY 24 HOURS
Status: DISCONTINUED | OUTPATIENT
Start: 2018-09-11 | End: 2018-09-11 | Stop reason: HOSPADM

## 2018-09-10 RX ORDER — ONDANSETRON 2 MG/ML
4 INJECTION INTRAMUSCULAR; INTRAVENOUS EVERY 6 HOURS PRN
Status: DISCONTINUED | OUTPATIENT
Start: 2018-09-10 | End: 2018-09-11 | Stop reason: HOSPADM

## 2018-09-10 RX ORDER — PANTOPRAZOLE SODIUM 40 MG/1
40 TABLET, DELAYED RELEASE ORAL DAILY
Status: DISCONTINUED | OUTPATIENT
Start: 2018-09-11 | End: 2018-09-11 | Stop reason: HOSPADM

## 2018-09-10 RX ORDER — LISINOPRIL 10 MG/1
10 TABLET ORAL DAILY
Status: DISCONTINUED | OUTPATIENT
Start: 2018-09-11 | End: 2018-09-11 | Stop reason: HOSPADM

## 2018-09-10 RX ORDER — IPRATROPIUM BROMIDE AND ALBUTEROL SULFATE 2.5; .5 MG/3ML; MG/3ML
3 SOLUTION RESPIRATORY (INHALATION) ONCE
Status: COMPLETED | OUTPATIENT
Start: 2018-09-10 | End: 2018-09-10

## 2018-09-10 RX ORDER — LIDOCAINE 50 MG/G
1 PATCH TOPICAL
Status: DISCONTINUED | OUTPATIENT
Start: 2018-09-11 | End: 2018-09-11 | Stop reason: HOSPADM

## 2018-09-10 RX ORDER — CLOPIDOGREL BISULFATE 75 MG/1
75 TABLET ORAL DAILY
Status: DISCONTINUED | OUTPATIENT
Start: 2018-09-11 | End: 2018-09-11 | Stop reason: HOSPADM

## 2018-09-10 RX ORDER — NITROGLYCERIN 0.4 MG/1
0.4 TABLET SUBLINGUAL AS NEEDED
Status: DISCONTINUED | OUTPATIENT
Start: 2018-09-10 | End: 2018-09-11 | Stop reason: HOSPADM

## 2018-09-10 RX ORDER — ATORVASTATIN CALCIUM 20 MG/1
20 TABLET, FILM COATED ORAL DAILY
Status: DISCONTINUED | OUTPATIENT
Start: 2018-09-11 | End: 2018-09-11 | Stop reason: HOSPADM

## 2018-09-10 RX ORDER — ALBUTEROL SULFATE 2.5 MG/3ML
0.63 SOLUTION RESPIRATORY (INHALATION) EVERY 6 HOURS PRN
Status: DISCONTINUED | OUTPATIENT
Start: 2018-09-10 | End: 2018-09-11 | Stop reason: HOSPADM

## 2018-09-10 RX ORDER — ACETAMINOPHEN 325 MG/1
650 TABLET ORAL EVERY 4 HOURS PRN
Status: DISCONTINUED | OUTPATIENT
Start: 2018-09-10 | End: 2018-09-11 | Stop reason: HOSPADM

## 2018-09-10 RX ORDER — NICOTINE POLACRILEX 4 MG
15 LOZENGE BUCCAL
Status: DISCONTINUED | OUTPATIENT
Start: 2018-09-10 | End: 2018-09-11 | Stop reason: HOSPADM

## 2018-09-10 RX ORDER — GABAPENTIN 400 MG/1
800 CAPSULE ORAL EVERY 8 HOURS SCHEDULED
Status: DISCONTINUED | OUTPATIENT
Start: 2018-09-11 | End: 2018-09-11 | Stop reason: HOSPADM

## 2018-09-10 RX ORDER — METOPROLOL TARTRATE 50 MG/1
50 TABLET, FILM COATED ORAL EVERY 12 HOURS SCHEDULED
Status: DISCONTINUED | OUTPATIENT
Start: 2018-09-11 | End: 2018-09-11 | Stop reason: HOSPADM

## 2018-09-10 RX ORDER — BUDESONIDE AND FORMOTEROL FUMARATE DIHYDRATE 160; 4.5 UG/1; UG/1
2 AEROSOL RESPIRATORY (INHALATION)
Status: DISCONTINUED | OUTPATIENT
Start: 2018-09-11 | End: 2018-09-11 | Stop reason: HOSPADM

## 2018-09-10 RX ORDER — ONDANSETRON 2 MG/ML
4 INJECTION INTRAMUSCULAR; INTRAVENOUS ONCE
Status: COMPLETED | OUTPATIENT
Start: 2018-09-10 | End: 2018-09-10

## 2018-09-10 RX ORDER — CETIRIZINE HYDROCHLORIDE 10 MG/1
10 TABLET ORAL DAILY
Status: DISCONTINUED | OUTPATIENT
Start: 2018-09-11 | End: 2018-09-11 | Stop reason: HOSPADM

## 2018-09-10 RX ADMIN — NITROGLYCERIN 1 INCH: 20 OINTMENT TOPICAL at 20:29

## 2018-09-10 RX ADMIN — ASPIRIN 325 MG: 325 TABLET ORAL at 17:55

## 2018-09-10 RX ADMIN — MORPHINE SULFATE 4 MG: 4 INJECTION INTRAVENOUS at 17:46

## 2018-09-10 RX ADMIN — IPRATROPIUM BROMIDE AND ALBUTEROL SULFATE 3 ML: .5; 3 SOLUTION RESPIRATORY (INHALATION) at 18:40

## 2018-09-10 RX ADMIN — ONDANSETRON 4 MG: 2 INJECTION INTRAMUSCULAR; INTRAVENOUS at 17:46

## 2018-09-10 RX ADMIN — METHYLPREDNISOLONE SODIUM SUCCINATE 125 MG: 125 INJECTION, POWDER, FOR SOLUTION INTRAMUSCULAR; INTRAVENOUS at 20:32

## 2018-09-10 NOTE — ED NOTES
"Pt states she isn't having \"chest pains or pains around her heart, it's in her lungs and constant when she is breathing\"     Breanne Brenner, RN  09/10/18 1802    "

## 2018-09-10 NOTE — ED PROVIDER NOTES
Subjective   58 years old female with competent medical history including coronary artery disease status post CABG, diabetes mellitus, uncontrolled hypertension, hyperlipidemia, sleep apnea, chronic tobacco dependence presented in the ER with a chief complaint of chest pain off and on for almost 1 week.  Patient reports that she started to have some upper respiratory symptoms with mild coughing and was evaluated at primary care office and her symptoms are improving.  Since morning she is having constant left-sided chest pain with no significant relieving factors and a aggravated with activity and coughing.  Also has mild shortness of breath but not any worse than usual.  No fever or chills reported.        History provided by:  Patient  Shortness of Breath   Severity:  Moderate  Onset quality:  Gradual  Duration:  1 week  Timing:  Constant  Progression:  Worsening  Chronicity:  New  Context: activity    Relieved by:  Nothing  Worsened by:  Activity and coughing  Ineffective treatments:  None tried  Associated symptoms: chest pain, cough, sputum production and wheezing    Associated symptoms: no abdominal pain, no diaphoresis, no fever, no headaches, no hemoptysis, no rash and no sore throat    Risk factors: obesity and tobacco use        Review of Systems   Constitutional: Negative for appetite change, diaphoresis and fever.   HENT: Negative for congestion, sinus pain and sore throat.    Eyes: Negative for pain.   Respiratory: Positive for cough, sputum production, chest tightness, shortness of breath and wheezing. Negative for hemoptysis.    Cardiovascular: Positive for chest pain. Negative for palpitations and leg swelling.   Gastrointestinal: Negative for abdominal pain and nausea.   Genitourinary: Negative for flank pain.   Musculoskeletal: Negative for back pain.   Skin: Negative for rash.   Neurological: Negative for headaches.   Psychiatric/Behavioral: Negative for agitation.       Past Medical History:    Diagnosis Date   • Anxiety states    • Asthma    • Carotid artery stenosis     DWIGHT 0-15%, LICA 0-15%. LICA stent 5/2014.   • Chronic folliculitis    • Chronic obstructive lung disease (CMS/[facility])    • Coronary arteriosclerosis    • Diabetes mellitus (CMS/[facility])     no retinopathy; A1C 9.1      • Dyslipidemia    • Essential hypertension    • Excoriated eczema     asteosis/keratosis   • GERD (gastroesophageal reflux disease)    • History of colon polyps    • Hypertensive disorder    • Kidney stone    • Mixed hyperlipidemia    • Morbid obesity due to excess calories (CMS/[facility])     BMI 44.5   • Neurologic disorder associated with diabetes mellitus (CMS/[facility])    • Non-healing surgical wound     LLE EVHa   • Peripheral vascular disease (CMS/[facility])    • Sleep apnea    • Tobacco dependence syndrome     1/2ppd      • Type 2 diabetes mellitus (CMS/[facility])    • Vitamin D deficiency        Allergies   Allergen Reactions   • Other      Bandaids, MRSA, SURECLOSE       Past Surgical History:   Procedure Laterality Date   • CARDIAC CATHETERIZATION  08/09/2013    Severe multivessel CAD with critical lesions noted in the RCA, obtuse marginal two, ramus intermemdious, and LAD as described above. Normal LV systolic function with no wall motion abnormality.    • CARDIAC CATHETERIZATION  05/27/2014     LEFT Carotid Stent    • CAROTID STENT Left 05/27/2014   • COLONOSCOPY  05/02/2016    Normal colon.Diverticulosis in the sigmoid colon.No specimens collected.    • CORONARY ARTERY BYPASS GRAFT  11/15/2013    CABG x 3 with LIMA to LAD and coronary endarterectomy to LAD, SVG to Ramus intermedius branch and SVG to PDA.    • ENDOSCOPY  09/23/2015     Esophageal stricture present.Normal stomach.Normal duodenum.    • ENDOSCOPY  11/16/2015    w/ dilatation - Esophageal stricture present,Dilatation performed.Normal stomach and duodenum.    • INCISION AND DRAINAGE ABSCESS  01/02/2016    Incision and drainage of right perineal abscess.    • INCISION AND  DRAINAGE ABSCESS  02/18/2014    Incision and Drainage of abscess of left lower leg    • OTHER SURGICAL HISTORY  01/25/2016    DESTRUCTION OF BENIGN LESION      • OTHER SURGICAL HISTORY  04/18/2014    ear/neck - L attempted CEA, Neck Dissection        Family History   Problem Relation Age of Onset   • Coronary artery disease Other    • Diabetes Other    • Heart disease Other    • Hypertension Other        Social History     Social History   • Marital status:      Spouse name: wesley dumont     Social History Main Topics   • Smoking status: Current Every Day Smoker     Packs/day: 0.25     Types: Cigarettes   • Smokeless tobacco: Never Used   • Alcohol use No   • Drug use: No     Other Topics Concern   • Not on file           Objective   Physical Exam   Constitutional: She is oriented to person, place, and time. She appears well-developed and well-nourished.   HENT:   Head: Normocephalic and atraumatic.   Nose: Nose normal.   Mouth/Throat: Oropharynx is clear and moist.   Eyes: Conjunctivae are normal.   Neck: Normal range of motion. Neck supple.   Cardiovascular: Normal rate, regular rhythm and normal heart sounds.    Pulmonary/Chest: Effort normal. She has wheezes.   Abdominal: Soft. There is no tenderness. There is no guarding.   Musculoskeletal: Normal range of motion.   Neurological: She is alert and oriented to person, place, and time.   Skin: Skin is warm. Capillary refill takes less than 2 seconds.   Psychiatric: She has a normal mood and affect.   Nursing note and vitals reviewed.      ECG 12 Lead    Date/Time: 9/10/2018 5:10 PM  Performed by: RAY BINGHAM  Authorized by: RAY BINGHAM   Interpreted by physician  Rhythm: sinus rhythm  BPM: 90  Other findings: prolonged QTc interval  Clinical impression: abnormal ECG  Comments: Nonspecific ST and T wave changes in the lateral leads.                 ED Course                  MDM  Number of Diagnoses or Management Options  Chest pain, rule out acute  myocardial infarction:   COPD exacerbation (CMS/[facility]):   Diagnosis management comments: 58 years old presented with chest pain.  EKG showed sinus rhythm with nonspecific changes in the lateral leads which are not any different than previous EKGs.  She is given symptomatic management for pain and snap treatment and on reevaluation she still have chest discomfort.  She is given nitro paste.  Her blood pressure is improving.  Initial troponins negative.  Chest x-ray shows some congestion but no focal pneumonia.  She has some element of COPD exacerbation and we'll give Solu-Medrol.  Patient discussed with resident on call and is being admitted for chest pain rule out and frequent neb treatments.       Amount and/or Complexity of Data Reviewed  Clinical lab tests: ordered and reviewed  Tests in the radiology section of CPT®: ordered and reviewed  Discuss the patient with other providers: yes        Labs Reviewed   COMPREHENSIVE METABOLIC PANEL - Abnormal; Notable for the following:        Result Value    Glucose 118 (*)     Alkaline Phosphatase 152 (*)     Globulin 3.7 (*)     A/G Ratio 1.0 (*)     All other components within normal limits   CBC WITH AUTO DIFFERENTIAL - Abnormal; Notable for the following:     WBC 10.92 (*)     RDW 16.8 (*)     RDW-SD 50.4 (*)     Immature Grans, Absolute 0.05 (*)     All other components within normal limits   LACTIC ACID, PLASMA - Abnormal; Notable for the following:     Lactate 2.1 (*)     All other components within normal limits   BNP (IN-HOUSE) - Normal   TROPONIN (IN-HOUSE) - Normal   RAINBOW DRAW    Narrative:     The following orders were created for panel order [place] Draw.  Procedure                               Abnormality         Status                     ---------                               -----------         ------                     Light Blue Top[[id number]]                                   Final result               Green Top (Gel)[[id number]]                                   Final result               Lavender Top[[id number]]                                     Final result               Gold Top - SST[[id number]]                                   Final result                 Please view results for these tests on the individual orders.   LACTIC ACID REFLEX TIMER   CBC AND DIFFERENTIAL    Narrative:     The following orders were created for panel order CBC & Differential.  Procedure                               Abnormality         Status                     ---------                               -----------         ------                     CBC Auto Differential[[id number]]        Abnormal            Final result                 Please view results for these tests on the individual orders.   LIGHT BLUE TOP   GREEN TOP   LAVENDER TOP   GOLD TOP - SST       Xr Chest 2 View    Result Date: 9/10/2018  Narrative: [facility] [place] Patient Name: DALE FINK ORDERING: RAY BINGHAM ATTENDING: RAY BINGHAM REFERRING: RAY BINGHAM ----------------------- PROCEDURE: Two-view chest COMPARISON: 8/26/2017 HISTORY: sob FINDINGS: Frontal and lateral views of the chest are obtained. Devices: None Lungs/Pleura: Mild pulmonary vascular congestion. The lungs otherwise appear clear. Cardiomediastinal structures: Sternal suture wires appear stable.     Impression: CONCLUSION:  Mild pulmonary vascular congestion. Electronically signed by:  Umesh Parra MD  9/10/2018 5:24 PM CDT Workstation: [id number]    Ct Lumbar Spine Without Contrast    Result Date: 8/11/2018  Narrative: PROCEDURE: CT of the lumbar spine without contrast. INDICATION: Thoracic and lumbar spine trauma. Low back pain. COMPARISON: 12/12/2016 CT abdomen and pelvis 8/7/2013 lumbar spine exam. Total DLP 2462.4 milliGray-cm. TECHNIQUE: This exam was performed according to our departmental dose-optimization program, which includes automated exposure control, adjustment of the mA and/or kV according to patient size  and/or use of iterative reconstruction technique. CT lumbar spine without contrast performed with axial and reconstructed sagittal and coronal images. Lumbar vertebrae are normal height and normally aligned. No acute compression injuries. The articular facets are normally aligned and intact. The lamina, spinous processes and transverse processes are intact. There are advanced degenerative disc changes and hypertrophic spurring at the L2-3 level. There is mild central stenosis at L2-3 secondary to facet and ligamentous hypertrophy and broad-based posterior disc bulge. Mild degenerative disc change L3-4. Broad-based posterior disc bulge L4-5 mild facet and ligamentous hypertrophy and mild central stenosis.     Impression: There are no fractures or acute osseous abnormalities in the lumbar spine. Advanced degenerative disc change and hypertrophic spurring L2-3. Mild central stenosis L2-3 with facet and ligamentous hypertrophy and broad-based posterior disc bulge. Mild central stenosis L3-4 with degenerative disc change and broad-based posterior disc bulge and facet and ligamentous hypertrophy. [id number] Electronically signed by:  Nirmal Verma MD  8/11/2018 10:26 PM CDT Workstation: [id number]    Xr Pelvis 1 Or 2 View    Result Date: 8/11/2018  Narrative: Exam: AP pelvis INDICATION: Status post fall FINDINGS: Mild degenerative changes are present in the lumbar spine. No acute fracture or dislocation. Joint spaces are maintained. No lytic or destructive lesion. Diffuse atherosclerotic plaque is seen.     Impression: No acute bony abnormality. Recommend follow-up as clinically warranted if the patient has persistent pain. Electronically signed by:  Kaleb Cruz MD  8/11/2018 9:34 PM CDT Workstation: [id number]        Final diagnoses:   Chest pain, rule out acute myocardial infarction   COPD exacerbation (CMS/[facility])            Rishabh Cruz MD  09/11/18 0156

## 2018-09-11 VITALS
WEIGHT: 285 LBS | SYSTOLIC BLOOD PRESSURE: 180 MMHG | BODY MASS INDEX: 52.44 KG/M2 | HEART RATE: 77 BPM | RESPIRATION RATE: 20 BRPM | OXYGEN SATURATION: 95 % | DIASTOLIC BLOOD PRESSURE: 81 MMHG | HEIGHT: 62 IN | TEMPERATURE: 97.6 F

## 2018-09-11 DIAGNOSIS — IMO0002 UNCONTROLLED TYPE 2 DIABETES MELLITUS WITH COMPLICATION, WITH LONG-TERM CURRENT USE OF INSULIN: ICD-10-CM

## 2018-09-11 PROBLEM — R07.82 INTERCOSTAL PAIN: Status: ACTIVE | Noted: 2018-09-10

## 2018-09-11 LAB
ANION GAP SERPL CALCULATED.3IONS-SCNC: 10 MMOL/L (ref 5–15)
BASOPHILS # BLD AUTO: 0.01 10*3/MM3 (ref 0–0.2)
BASOPHILS NFR BLD AUTO: 0.1 % (ref 0–2)
BUN BLD-MCNC: 22 MG/DL (ref 7–21)
BUN/CREAT SERPL: 20.4 (ref 7–25)
CALCIUM SPEC-SCNC: 8.5 MG/DL (ref 8.4–10.2)
CHLORIDE SERPL-SCNC: 103 MMOL/L (ref 95–110)
CO2 SERPL-SCNC: 25 MMOL/L (ref 22–31)
CRE SCREEN PCR: NOT DETECTED
CREAT BLD-MCNC: 1.08 MG/DL (ref 0.5–1)
DEPRECATED RDW RBC AUTO: 50.1 FL (ref 36.4–46.3)
EOSINOPHIL # BLD AUTO: 0.01 10*3/MM3 (ref 0–0.7)
EOSINOPHIL NFR BLD AUTO: 0.1 % (ref 0–7)
ERYTHROCYTE [DISTWIDTH] IN BLOOD BY AUTOMATED COUNT: 16.8 % (ref 11.5–14.5)
GFR SERPL CREATININE-BSD FRML MDRD: 52 ML/MIN/1.73 (ref 51–120)
GLUCOSE BLD-MCNC: 253 MG/DL (ref 60–100)
GLUCOSE BLDC GLUCOMTR-MCNC: 262 MG/DL (ref 70–130)
GLUCOSE BLDC GLUCOMTR-MCNC: 272 MG/DL (ref 70–130)
GLUCOSE BLDC GLUCOMTR-MCNC: 307 MG/DL (ref 70–130)
HBA1C MFR BLD: 7 % (ref 4–5.6)
HCT VFR BLD AUTO: 40.6 % (ref 35–45)
HGB BLD-MCNC: 13.3 G/DL (ref 12–15.5)
IMM GRANULOCYTES # BLD: 0.03 10*3/MM3 (ref 0–0.02)
IMM GRANULOCYTES NFR BLD: 0.4 % (ref 0–0.5)
IMP STRAIN: NOT DETECTED
KPC STRAIN: NOT DETECTED
LYMPHOCYTES # BLD AUTO: 1.25 10*3/MM3 (ref 0.6–4.2)
LYMPHOCYTES NFR BLD AUTO: 15.4 % (ref 10–50)
MAGNESIUM SERPL-MCNC: 1.5 MG/DL (ref 1.6–2.3)
MCH RBC QN AUTO: 27.1 PG (ref 26.5–34)
MCHC RBC AUTO-ENTMCNC: 32.8 G/DL (ref 31.4–36)
MCV RBC AUTO: 82.7 FL (ref 80–98)
MONOCYTES # BLD AUTO: 0.04 10*3/MM3 (ref 0–0.9)
MONOCYTES NFR BLD AUTO: 0.5 % (ref 0–12)
NDM STRAIN: NOT DETECTED
NEUTROPHILS # BLD AUTO: 6.76 10*3/MM3 (ref 2–8.6)
NEUTROPHILS NFR BLD AUTO: 83.5 % (ref 37–80)
OXA 48 STRAIN: NOT DETECTED
PLATELET # BLD AUTO: 243 10*3/MM3 (ref 150–450)
PMV BLD AUTO: 9.3 FL (ref 8–12)
POTASSIUM BLD-SCNC: 4.7 MMOL/L (ref 3.5–5.1)
RBC # BLD AUTO: 4.91 10*6/MM3 (ref 3.77–5.16)
SODIUM BLD-SCNC: 138 MMOL/L (ref 137–145)
TROPONIN I SERPL-MCNC: <0.012 NG/ML
VIM STRAIN: NOT DETECTED
WBC NRBC COR # BLD: 8.1 10*3/MM3 (ref 3.2–9.8)

## 2018-09-11 PROCEDURE — 84484 ASSAY OF TROPONIN QUANT: CPT | Performed by: FAMILY MEDICINE

## 2018-09-11 PROCEDURE — 94640 AIRWAY INHALATION TREATMENT: CPT

## 2018-09-11 PROCEDURE — 63710000001 INSULIN ASPART PER 5 UNITS: Performed by: FAMILY MEDICINE

## 2018-09-11 PROCEDURE — 94799 UNLISTED PULMONARY SVC/PX: CPT

## 2018-09-11 PROCEDURE — 63710000001 INSULIN DETEMIR PER 5 UNITS: Performed by: FAMILY MEDICINE

## 2018-09-11 PROCEDURE — 94760 N-INVAS EAR/PLS OXIMETRY 1: CPT

## 2018-09-11 PROCEDURE — 96365 THER/PROPH/DIAG IV INF INIT: CPT

## 2018-09-11 PROCEDURE — 96375 TX/PRO/DX INJ NEW DRUG ADDON: CPT

## 2018-09-11 PROCEDURE — 99219 PR INITIAL OBSERVATION CARE/DAY 50 MINUTES: CPT | Performed by: FAMILY MEDICINE

## 2018-09-11 PROCEDURE — 25010000002 MAGNESIUM SULFATE IN D5W 1G/100ML (PREMIX) 1-5 GM/100ML-% SOLUTION: Performed by: FAMILY MEDICINE

## 2018-09-11 PROCEDURE — G0378 HOSPITAL OBSERVATION PER HR: HCPCS

## 2018-09-11 PROCEDURE — 25010000002 KETOROLAC TROMETHAMINE PER 15 MG: Performed by: FAMILY MEDICINE

## 2018-09-11 PROCEDURE — 85025 COMPLETE CBC W/AUTO DIFF WBC: CPT | Performed by: FAMILY MEDICINE

## 2018-09-11 PROCEDURE — 87081 CULTURE SCREEN ONLY: CPT | Performed by: FAMILY MEDICINE

## 2018-09-11 PROCEDURE — 82962 GLUCOSE BLOOD TEST: CPT

## 2018-09-11 PROCEDURE — 80048 BASIC METABOLIC PNL TOTAL CA: CPT | Performed by: FAMILY MEDICINE

## 2018-09-11 RX ORDER — KETOROLAC TROMETHAMINE 30 MG/ML
30 INJECTION, SOLUTION INTRAMUSCULAR; INTRAVENOUS ONCE
Status: COMPLETED | OUTPATIENT
Start: 2018-09-11 | End: 2018-09-11

## 2018-09-11 RX ORDER — NICOTINE 21 MG/24HR
1 PATCH, TRANSDERMAL 24 HOURS TRANSDERMAL EVERY 24 HOURS
Qty: 14 PATCH | Refills: 0 | Status: SHIPPED | OUTPATIENT
Start: 2018-09-12 | End: 2018-09-19

## 2018-09-11 RX ORDER — LIDOCAINE 50 MG/G
1 PATCH TOPICAL
Qty: 15 EACH | Refills: 0 | Status: SHIPPED | OUTPATIENT
Start: 2018-09-11 | End: 2019-02-06 | Stop reason: SDUPTHER

## 2018-09-11 RX ORDER — FUROSEMIDE 20 MG/1
20 TABLET ORAL ONCE
Status: COMPLETED | OUTPATIENT
Start: 2018-09-11 | End: 2018-09-11

## 2018-09-11 RX ORDER — MAGNESIUM SULFATE 1 G/100ML
1 INJECTION INTRAVENOUS ONCE
Status: COMPLETED | OUTPATIENT
Start: 2018-09-11 | End: 2018-09-11

## 2018-09-11 RX ADMIN — INSULIN ASPART 12 UNITS: 100 INJECTION, SOLUTION INTRAVENOUS; SUBCUTANEOUS at 01:01

## 2018-09-11 RX ADMIN — ATORVASTATIN CALCIUM 20 MG: 20 TABLET, FILM COATED ORAL at 09:05

## 2018-09-11 RX ADMIN — LIDOCAINE 1 PATCH: 50 PATCH CUTANEOUS at 07:23

## 2018-09-11 RX ADMIN — NYSTATIN 1 APPLICATION: 100000 POWDER TOPICAL at 09:06

## 2018-09-11 RX ADMIN — NICOTINE 1 PATCH: 21 PATCH TRANSDERMAL at 01:03

## 2018-09-11 RX ADMIN — METOPROLOL TARTRATE 50 MG: 50 TABLET ORAL at 09:05

## 2018-09-11 RX ADMIN — GABAPENTIN 800 MG: 400 CAPSULE ORAL at 06:29

## 2018-09-11 RX ADMIN — INSULIN ASPART 16 UNITS: 100 INJECTION, SOLUTION INTRAVENOUS; SUBCUTANEOUS at 10:54

## 2018-09-11 RX ADMIN — FERROUS SULFATE TAB EC 324 MG (65 MG FE EQUIVALENT) 324 MG: 324 (65 FE) TABLET DELAYED RESPONSE at 07:23

## 2018-09-11 RX ADMIN — INSULIN ASPART 12 UNITS: 100 INJECTION, SOLUTION INTRAVENOUS; SUBCUTANEOUS at 07:22

## 2018-09-11 RX ADMIN — MAGNESIUM SULFATE HEPTAHYDRATE 1 G: 1 INJECTION, SOLUTION INTRAVENOUS at 07:25

## 2018-09-11 RX ADMIN — INSULIN ASPART 45 UNITS: 100 INJECTION, SOLUTION INTRAVENOUS; SUBCUTANEOUS at 10:56

## 2018-09-11 RX ADMIN — CLOPIDOGREL BISULFATE 75 MG: 75 TABLET ORAL at 09:05

## 2018-09-11 RX ADMIN — FUROSEMIDE 20 MG: 20 TABLET ORAL at 12:33

## 2018-09-11 RX ADMIN — METOPROLOL TARTRATE 50 MG: 50 TABLET ORAL at 01:01

## 2018-09-11 RX ADMIN — KETOROLAC TROMETHAMINE 30 MG: 30 INJECTION, SOLUTION INTRAMUSCULAR; INTRAVENOUS at 11:00

## 2018-09-11 RX ADMIN — INSULIN DETEMIR 95 UNITS: 100 INJECTION, SOLUTION SUBCUTANEOUS at 09:06

## 2018-09-11 RX ADMIN — LISINOPRIL 10 MG: 10 TABLET ORAL at 09:05

## 2018-09-11 RX ADMIN — MONTELUKAST SODIUM 10 MG: 10 TABLET, COATED ORAL at 01:01

## 2018-09-11 RX ADMIN — INSULIN DETEMIR 95 UNITS: 100 INJECTION, SOLUTION SUBCUTANEOUS at 01:01

## 2018-09-11 RX ADMIN — GABAPENTIN 800 MG: 400 CAPSULE ORAL at 01:01

## 2018-09-11 RX ADMIN — INSULIN ASPART 45 UNITS: 100 INJECTION, SOLUTION INTRAVENOUS; SUBCUTANEOUS at 07:25

## 2018-09-11 RX ADMIN — PANTOPRAZOLE SODIUM 40 MG: 40 TABLET, DELAYED RELEASE ORAL at 09:05

## 2018-09-11 RX ADMIN — ACETAMINOPHEN 650 MG: 325 TABLET ORAL at 09:06

## 2018-09-11 RX ADMIN — CETIRIZINE HYDROCHLORIDE 10 MG: 10 TABLET, FILM COATED ORAL at 09:05

## 2018-09-11 NOTE — H&P
"HISTORY AND PHYSICAL  NAME: Yamileth Slater  : 1960  MRN: [id number]    DATE OF ADMISSION: 09/10/18    DATE & TIME SEEN: 09/10/18 10:05 PM    PCP: Nirmal Calvillo MD    CODE STATUS:   Code Status and Medical Interventions:   Ordered at: 09/10/18 2323     Level Of Support Discussed With:    Patient     Code Status:    CPR     Medical Interventions (Level of Support Prior to Arrest):    Full       CHIEF COMPLAINT  Chief Complaint   Patient presents with   • Chest Pain   • Pain With Breathing       HPI:  Yamileth Slater is a 58 y.o. morbidly obese,  female who presents with 4 day history of \"lung pain\" that was sudden in onset and has gradually worsened. Patient reports that the chest/lung pain worsens with movement and deep inspiration or cough.  She has been applying a heating pad to her left chest which has provided some level of comfort. Patient was seen by her PCP last week and diagnosed with a viral URI.  Symptoms included rhinorrhea and sore throat and most recently cough.  Patient advised to allow viral illness to run its course and encouraged symptomatic management.  Patient reports that she has been using her Symbicort once a day, as she believed was prescribed, and a nebulized albuterol treatment which provided little avail of her symptoms.  Of note patient has continued to use tobacco during this time.  Additionally, she reports that she does not have any means of transportation so she only came to the ED today when transportation became available.    Of note patient also has a concurrent medical history of coronary artery disease status post CABG in 2013.  Given her comorbidities, history of tobacco use, and family history of heart disease decision was made to admit patient for observation and rule out cardiac etiology.     CONCURRENT MEDICAL HISTORY:  Past Medical History:   Diagnosis Date   • Anxiety states    • Asthma    • Carotid artery stenosis     DWIGHT 0-15%, LICA " 0-15%. LICA stent 5/2014.   • Chronic folliculitis    • Chronic obstructive lung disease (CMS/HCC)    • Coronary arteriosclerosis    • Diabetes mellitus (CMS/[facility])     no retinopathy; A1C 9.1      • Dyslipidemia    • Essential hypertension    • Excoriated eczema     asteosis/keratosis   • GERD (gastroesophageal reflux disease)    • History of colon polyps    • Hypertensive disorder    • Kidney stone    • Mixed hyperlipidemia    • Morbid obesity due to excess calories (CMS/[facility])     BMI 44.5   • Neurologic disorder associated with diabetes mellitus (CMS/[facility])    • Non-healing surgical wound     LLE EVHa   • Peripheral vascular disease (CMS/[facility])    • Sleep apnea    • Tobacco dependence syndrome     1/2ppd      • Type 2 diabetes mellitus (CMS/[facility])    • Vitamin D deficiency        PAST SURGICAL HISTORY:  Past Surgical History:   Procedure Laterality Date   • CARDIAC CATHETERIZATION  08/09/2013    Severe multivessel CAD with critical lesions noted in the RCA, obtuse marginal two, ramus intermemdious, and LAD as described above. Normal LV systolic function with no wall motion abnormality.    • CARDIAC CATHETERIZATION  05/27/2014     LEFT Carotid Stent    • CAROTID STENT Left 05/27/2014   • COLONOSCOPY  05/02/2016    Normal colon.Diverticulosis in the sigmoid colon.No specimens collected.    • CORONARY ARTERY BYPASS GRAFT  11/15/2013    CABG x 3 with LIMA to LAD and coronary endarterectomy to LAD, SVG to Ramus intermedius branch and SVG to PDA.    • ENDOSCOPY  09/23/2015     Esophageal stricture present.Normal stomach.Normal duodenum.    • ENDOSCOPY  11/16/2015    w/ dilatation - Esophageal stricture present,Dilatation performed.Normal stomach and duodenum.    • INCISION AND DRAINAGE ABSCESS  01/02/2016    Incision and drainage of right perineal abscess.    • INCISION AND DRAINAGE ABSCESS  02/18/2014    Incision and Drainage of abscess of left lower leg    • OTHER SURGICAL HISTORY  01/25/2016    DESTRUCTION OF BENIGN LESION       • OTHER SURGICAL HISTORY  04/18/2014    ear/neck - L attempted CEA, Neck Dissection        FAMILY HISTORY:  Family History   Problem Relation Age of Onset   • Heart disease Mother    • Cancer Mother    • Heart disease Father    • Heart disease Sister    • Cancer Sister    • Heart disease Brother         SOCIAL HISTORY:  Social History     Social History   • Marital status:      Spouse name: wesley dumont   • Number of children: N/A   • Years of education: N/A     Occupational History   • Not on file.     Social History Main Topics   • Smoking status: Current Every Day Smoker     Packs/day: 1.00     Years: 40.00     Types: Cigarettes   • Smokeless tobacco: Never Used   • Alcohol use No   • Drug use: Yes     Types: LSD, Marijuana, Methamphetamines      Comment: Lasted used in 2006   • Sexual activity: Not Currently     Partners: Male     Other Topics Concern   • Not on file     Social History Narrative   • No narrative on file       HOME MEDICATIONS:  No current facility-administered medications on file prior to encounter.      Current Outpatient Prescriptions on File Prior to Encounter   Medication Sig Dispense Refill   • albuterol (ACCUNEB) 0.63 MG/3ML nebulizer solution Take 3 mL by nebulization Every 6 (Six) Hours As Needed for Wheezing. 20 vial 0   • atorvastatin (LIPITOR) 20 MG tablet Take 1 tablet by mouth Daily. 30 tablet 3   • betamethasone valerate (VALISONE) 0.1 % cream Apply  topically to the appropriate area as directed Daily. 45 g 2   • Blood Glucose Monitoring Suppl ([employer] BLOOD GLUCOSE METER) W/DEVICE kit 1 each 3 (Three) Times a Day. 1 kit 3   • budesonide-formoterol (SYMBICORT) 160-4.5 MCG/ACT inhaler Inhale 2 puffs 2 (Two) Times a Day. 10.2 g 6   • cetirizine (zyrTEC) 10 MG tablet Take 1 tablet by mouth Daily. 30 tablet 3   • clopidogrel (PLAVIX) 75 MG tablet Take 1 tablet by mouth Daily. 30 tablet 3   • Empagliflozin (JARDIANCE) 10 MG tablet Take 10 mg by mouth Every Morning. 30 tablet 3   •  ferrous sulfate (FERROUSUL) 325 (65 FE) MG tablet Take 1 tablet by mouth Daily With Breakfast. 30 tablet 3   • gabapentin (NEURONTIN) 800 MG tablet Take 1 tablet by mouth 3 (Three) Times a Day. For neuropathy 90 tablet 3   • glucose blood test strip Use as instructed 100 each 12   • Insulin Lispro (HUMALOG KWIKPEN) 100 UNIT/ML solution pen-injector Inject 45 Units under the skin 3 (Three) Times a Day Before Meals. 60 mL 11   • Insulin Pen Needle (NOVOFINE) 30G X 8 MM misc As directed 4 times daily 100 each 11   • ipratropium-albuterol (DUO-NEB) 0.5-2.5 mg/3 ml nebulizer Take 3 mL by nebulization 4 (Four) Times a Day. ICD10 code J96.01 360 mL 0   • LANTUS SOLOSTAR 100 UNIT/ML injection pen Inject 95 Units under the skin into the appropriate area as directed Every 12 (Twelve) Hours. 3 mL 11   • Liraglutide (VICTOZA) 18 MG/3ML solution pen-injector injection Inject 1.2 mg under the skin into the appropriate area as directed Every Morning. 9 mL 3   • lisinopril (PRINIVIL,ZESTRIL) 10 MG tablet Take 1 tablet by mouth Daily. 30 tablet 3   • meloxicam (MOBIC) 7.5 MG tablet Take 1 tablet by mouth Daily. Start medication on 8/24/18 30 tablet 1   • metFORMIN ER (GLUCOPHAGE-XR) 500 MG 24 hr tablet TAKE 2 TABLET BY MOUTH 2 (TWO) TIMES A DAY WITH MEALS. 120 tablet 3   • methocarbamol (ROBAXIN) 750 MG tablet Take 1 tablet by mouth 4 (Four) Times a Day As Needed for Muscle Spasms. 30 tablet 1   • metoprolol tartrate (LOPRESSOR) 50 MG tablet Take 1 tablet by mouth Every 12 (Twelve) Hours. 60 tablet 3   • montelukast (SINGULAIR) 10 MG tablet Take 1 tablet by mouth Every Night. 30 tablet 3   • nitroglycerin (NITROSTAT) 0.4 MG SL tablet Place 1 tablet under the tongue As Needed for Chest Pain. Take no more than 3 doses in 15 minutes. 30 tablet 3   • nystatin (MYCOSTATIN) 718165 UNIT/GM powder Apply  topically to the appropriate area as directed 3 (Three) Times a Day. 15 g 1   • pantoprazole (PROTONIX) 40 MG EC tablet Take 1 tablet by  mouth Daily. 30 tablet 3   • potassium chloride (K-DUR) 10 MEQ CR tablet TAKE 1 TABLET BY MOUTH EVERY NIGHT. 30 tablet 3   • promethazine (PHENERGAN) 25 MG tablet Take 1 tablet by mouth Every 6 (Six) Hours As Needed for Nausea or Vomiting. 30 tablet 0   • [DISCONTINUED] insulin glargine (LANTUS) 100 UNIT/ML injection Inject 95 Units under the skin Every 12 (Twelve) Hours. 1 each 11   • albuterol (PROVENTIL HFA;VENTOLIN HFA) 108 (90 Base) MCG/ACT inhaler Inhale 2 puffs Every 4 (Four) Hours As Needed for Wheezing. (Patient not taking: Reported on 9/10/2018) 1 inhaler 0   • cyclobenzaprine (FLEXERIL) 10 MG tablet TAKE 1 TABLET BY MOUTH 2 (TWO) TIMES A DAY AS NEEDED FOR MUSCLE SPASMS. (Patient not taking: Reported on 9/10/2018) 60 tablet 2   • ibuprofen (ADVIL,MOTRIN) 800 MG tablet Take 1 tablet by mouth Every 6 (Six) Hours As Needed for Mild Pain . (Patient not taking: Reported on 9/10/2018) 20 tablet 0   • methocarbamol (ROBAXIN) 500 MG tablet Take 1 tablet by mouth 4 (Four) Times a Day. (Patient not taking: Reported on 9/10/2018) 20 tablet 0   • methylPREDNISolone (MEDROL) 4 MG tablet Take 1 tablet by mouth Daily. Please follow instructions on medrol dose zachary (Patient not taking: Reported on 9/10/2018) 21 tablet 0   • oxyCODONE-acetaminophen (PERCOCET) 5-325 MG per tablet Take 1 tablet by mouth Every 6 (Six) Hours As Needed for Severe Pain . (Patient not taking: Reported on 9/10/2018) 12 tablet 0       ALLERGIES:  Other    REVIEW OF SYSTEMS  Review of Systems   Constitutional: Negative for chills, diaphoresis and fever.   HENT: Positive for postnasal drip, rhinorrhea and sore throat. Negative for congestion and sneezing.    Eyes: Negative for discharge and redness.   Respiratory: Positive for cough and shortness of breath. Negative for wheezing.    Cardiovascular: Positive for chest pain. Negative for leg swelling.   Gastrointestinal: Negative for abdominal pain, diarrhea, nausea and vomiting.   Genitourinary:  Negative for difficulty urinating, dysuria and hematuria.   Musculoskeletal: Positive for arthralgias and back pain. Negative for gait problem and joint swelling.   Skin: Positive for rash. Negative for wound.   Neurological: Negative for seizures, syncope, light-headedness and headaches.   Psychiatric/Behavioral: Negative for behavioral problems, confusion and sleep disturbance.       PHYSICAL EXAM:  Temp:  [96.5 °F (35.8 °C)-96.9 °F (36.1 °C)] 96.5 °F (35.8 °C)  Heart Rate:  [85-91] 88  Resp:  [18-20] 20  BP: (138-190)/(65-92) 152/65  Body mass index is 51.58 kg/m².  Physical Exam   Constitutional: She is oriented to person, place, and time. She appears well-developed and well-nourished. No distress. Nasal cannula in place.   HENT:   Head: Normocephalic and atraumatic.   Mouth/Throat: Oropharynx is clear and moist.   Eyes: Pupils are equal, round, and reactive to light. Conjunctivae and EOM are normal. No scleral icterus.   Neck: Normal range of motion. Neck supple. No tracheal deviation present. No thyromegaly present.   Cardiovascular: Normal rate, regular rhythm, normal heart sounds and intact distal pulses.    Pulmonary/Chest: Effort normal. She has decreased breath sounds (due to habitus). She has wheezes. She has no rales.   Pain elicited with deep palpation of left chest   Abdominal: Soft. Bowel sounds are normal. There is no tenderness.   Musculoskeletal: Normal range of motion. She exhibits no edema or tenderness.   Lymphadenopathy:     She has no cervical adenopathy.   Neurological: She is alert and oriented to person, place, and time. No cranial nerve deficit.   Skin: Skin is warm and dry. Rash (abdominal intertrigal fold) noted. She is not diaphoretic.   Surgical scar along the left medial lower leg consistent with venous harvest.   Psychiatric: She has a normal mood and affect. Her behavior is normal. Judgment and thought content normal.   Nursing note and vitals reviewed.      DIAGNOSTIC DATA:   Lab  Results (last 24 hours)     Procedure Component Value Units Date/Time    Lactic Acid, Reflex [[id number]]  (Normal) Collected:  09/10/18 2140    Specimen:  Blood from Arm, Left Updated:  09/10/18 2159     Lactate 1.3 mmol/L     Lactic Acid, Reflex Timer (This will reflex a repeat order 3-3:15 hours after ordered.) [[id number]] Collected:  09/10/18 1745    Specimen:  Blood Updated:  09/10/18 2131     Extra Tube Hold for add-ons.     Comment: Auto resulted.       [place] Draw [[id number]] Collected:  09/10/18 1745    Specimen:  Blood Updated:  09/10/18 1900    Narrative:       The following orders were created for panel order [place] Draw.  Procedure                               Abnormality         Status                     ---------                               -----------         ------                     Light Blue Top[[id number]]                                   Final result               Green Top (Gel)[[id number]]                                  Final result               Lavender Top[[id number]]                                     Final result               Gold Top - SST[[id number]]                                   Final result                 Please view results for these tests on the individual orders.    Light Blue Top [[id number]] Collected:  09/10/18 1745    Specimen:  Blood Updated:  09/10/18 1900     Extra Tube hold for add-on     Comment: Auto resulted       Green Top (Gel) [[id number]] Collected:  09/10/18 1745    Specimen:  Blood Updated:  09/10/18 1900     Extra Tube Hold for add-ons.     Comment: Auto resulted.       Lavender Top [[id number]] Collected:  09/10/18 1745    Specimen:  Blood Updated:  09/10/18 1900     Extra Tube hold for add-on     Comment: Auto resulted       Gold Top - SST [[id number]] Collected:  09/10/18 1745    Specimen:  Blood Updated:  09/10/18 1900     Extra Tube Hold for add-ons.     Comment: Auto resulted.       BNP [[id number]]  (Normal) Collected:  09/10/18 1745    Specimen:   Blood Updated:  09/10/18 1838     proBNP 150.0 pg/mL     Troponin [[id number]]  (Normal) Collected:  09/10/18 1745    Specimen:  Blood Updated:  09/10/18 1838     Troponin I <0.012 ng/mL     Lactic Acid, Plasma [[id number]]  (Abnormal) Collected:  09/10/18 1745    Specimen:  Blood Updated:  09/10/18 1827     Lactate 2.1 (C) mmol/L     Comprehensive Metabolic Panel [[id number]]  (Abnormal) Collected:  09/10/18 1745    Specimen:  Blood Updated:  09/10/18 1821     Glucose 118 (H) mg/dL      BUN 16 mg/dL      Creatinine 0.95 mg/dL      Sodium 138 mmol/L      Potassium 3.8 mmol/L      Chloride 103 mmol/L      CO2 27.0 mmol/L      Calcium 8.7 mg/dL      Total Protein 7.5 g/dL      Albumin 3.80 g/dL      ALT (SGPT) 33 U/L      AST (SGOT) 31 U/L      Alkaline Phosphatase 152 (H) U/L      Total Bilirubin 0.3 mg/dL      eGFR Non African Amer 60 mL/min/1.73      Globulin 3.7 (H) gm/dL      A/G Ratio 1.0 (L) g/dL      BUN/Creatinine Ratio 16.8     Anion Gap 8.0 mmol/L     CBC & Differential [[id number]] Collected:  09/10/18 1745    Specimen:  Blood Updated:  09/10/18 1757    Narrative:       The following orders were created for panel order CBC & Differential.  Procedure                               Abnormality         Status                     ---------                               -----------         ------                     CBC Auto Differential[[id number]]        Abnormal            Final result                 Please view results for these tests on the individual orders.    CBC Auto Differential [[id number]]  (Abnormal) Collected:  09/10/18 1745    Specimen:  Blood Updated:  09/10/18 1757     WBC 10.92 (H) 10*3/mm3      RBC 4.91 10*6/mm3      Hemoglobin 13.4 g/dL      Hematocrit 41.0 %      MCV 83.5 fL      MCH 27.3 pg      MCHC 32.7 g/dL      RDW 16.8 (H) %      RDW-SD 50.4 (H) fl      MPV 8.9 fL      Platelets 240 10*3/mm3      Neutrophil % 53.5 %      Lymphocyte % 36.8 %      Monocyte % 5.1 %      Eosinophil % 3.6 %       Basophil % 0.5 %      Immature Grans % 0.5 %      Neutrophils, Absolute 5.85 10*3/mm3      Lymphocytes, Absolute 4.02 10*3/mm3      Monocytes, Absolute 0.56 10*3/mm3      Eosinophils, Absolute 0.39 10*3/mm3      Basophils, Absolute 0.05 10*3/mm3      Immature Grans, Absolute 0.05 (H) 10*3/mm3            Imaging Results (last 24 hours)     Procedure Component Value Units Date/Time    XR Chest 2 View [[id number]] Collected:  09/10/18 1704     Updated:  09/10/18 1726    Narrative:         [facility] [place]    Patient Name: DALE FINK    ORDERING: RAY BINGHAM     ATTENDING: RAY BINGHAM     REFERRING: RAY BINGHAM    -----------------------    PROCEDURE: Two-view chest    COMPARISON: 8/26/2017    HISTORY: sob    FINDINGS: Frontal and lateral views of the chest are obtained.     Devices: None    Lungs/Pleura: Mild pulmonary vascular congestion. The lungs  otherwise appear clear.    Cardiomediastinal structures: Sternal suture wires appear stable.          Impression:       CONCLUSION:    Mild pulmonary vascular congestion.    Electronically signed by:  Umesh Parra MD  9/10/2018 5:24 PM CDT  Workstation: [id number]          I reviewed the patient's new clinical results.  I reviewed the patient's new imaging results and agree with the interpretation.    ASSESSMENT AND PLAN: This is a 58 y.o. female with:    Active Hospital Problems    Diagnosis Date Noted   • **Intercostal pain [R07.82] 09/10/2018     Priority: High     -Monitoring cardiac activity with telemetry  -Trending cardiac enzyme, troponin; initial troponin in the ED was WNL  -Will provide patient with sublingual nitroglycerin as needed  -Will provide patient with lidocaine patch for possible musculoskeletal pain as elicited with physical exam  -Encourage smoking cessation and incentive spirometry for possible pleuritic pain     • Type 2 diabetes mellitus with diabetic polyneuropathy, with long-term current use of insulin (CMS/[facility]) [E11.42,  Z79.4] 08/24/2016     Priority: High     -Lasting hemoglobin A1c was at goal, 6.1%  -We'll continue insulin regimen: 95 units of basal insulin BID and 45 units of short-acting insulin with meals  -Holding metformin in the event of imaging during hospital course  -Holding Jardiance and Victoza as it is not on formulary  -Will monitor glucose with regular fingersticks and provide sliding scale insulin  -We'll continue gabapentin for diabetic neuropathy; JULIAN report ([id number]) was obtained and is consistent with patient's reported medications     • Chronic obstructive pulmonary disease (CMS/[facility]) [J44.9] 08/24/2016     Priority: High     -Continue prescribed Symbicort; patient reports that she is only taking it once a day; will educate on proper dosing  -Continue Singulair 10 mg by mouth nightly  -We'll provide duo nebs 4 times a day while awake and albuterol as needed for wheezing and shortness of air  -We'll provide supplemental oxygen as needed in order to maintain saturation greater than 90%  -Encourage incentive spirometry and oscillating positive expiratory pressure device  -Encourage smoking cessation     • Coronary artery disease involving native coronary artery of native heart without angina pectoris [I25.10]      Priority: High     -S/p CABG X3 in 2013  -Continue dual antiplatelet therapy: ASA and Plavix  -Continue statin therapy, Atorvastatin  -Continue antihypertensive therapy, Lopressor     • Tobacco dependence syndrome [F17.200]      Priority: Medium     -Patient has a one pack per day for 40+ year smoking history  -We'll encourage smoking cessation  -We'll provide nicotine replacement therapy (patch); patient reports that OTC nicotine patches have irritating adhesive and prefers the hospital brand     • Morbid obesity with BMI of 50.0-59.9, adult (CMS/[facility]) [E66.01, Z68.43]      Priority: Medium     -Body mass index is 52.13 kg/m².   -Consult dietary/nutrition in order to educate patient  -Patient is  interested in bariatric surgery; encouraged outpatient follow-up with PCP         We will continue to monitor the patient's course and adjust our care accordingly.     DVT prophylaxis: SCDs/MATT    Code status is   Code Status and Medical Interventions:   Ordered at: 09/10/18 1036     Level Of Support Discussed With:    Patient     Code Status:    CPR     Medical Interventions (Level of Support Prior to Arrest):    Full      JULIAN # [id number], reviewed and consistent with patient reported medications.    Anticipated length of stay: 1-2 nights    I discussed the patients findings and my recommendations with patient, family and primary care team.     Dr. ARMEN Thomson is the attending on record at time of admission; she is aware of the patient's status and agrees with the above history and physical.    Signature  Marci Johns MD  [facility] Family Medicine Resident, PGYIII        This document has been electronically signed by Marci Johns MD on September 10, 2018 11:35 PM

## 2018-09-11 NOTE — PLAN OF CARE
Problem: Patient Care Overview  Goal: Plan of Care Review  Outcome: Ongoing (interventions implemented as appropriate)  Making lifestyle Choices for a Healthier Weight used to provide ed regarding Wt Loss Diet and diet copy given.   09/11/18 1311   Coping/Psychosocial   Plan of Care Reviewed With patient   Plan of Care Review   Progress no change   OTHER   Outcome Summary initial visit

## 2018-09-11 NOTE — DISCHARGE SUMMARY
[address] [address]  T - [phone]     DISCHARGE SUMMARY         PATIENT  DEMOGRAPHICS   PATIENT NAME: Yamileth Nga Slater                      PHYSICIAN: Nirmal Calvillo MD  : 1960  MRN: [id number]    ADMISSION/DISCHARGE INFO   ADMISSION DATE: 9/10/2018   DISCHARGE DATE: 18    ADMISSION DIAGNOSES: COPD exacerbation (CMS/[facility]) [J44.1]  Chest pain, rule out acute myocardial infarction [R07.9]  DISCHARGE DIAGNOSES:     Principal Problem:    Intercostal pain  Active Problems:    Type 2 diabetes mellitus with diabetic polyneuropathy, with long-term current use of insulin (CMS/[facility])    Chronic obstructive pulmonary disease (CMS/[facility])    Tobacco dependence syndrome    Morbid obesity with BMI of 50.0-59.9, adult (CMS/[facility])    Coronary artery disease involving native coronary artery of native heart without angina pectoris      SERVICE: Family Medicine  ATTENDING PROVIDER: Dr. Thomson  RESIDENT: Nirmal Calvillo MD     CONSULTS   Consult Orders (all)     None          PROCEDURES     Imaging Results (last 24 hours)     Procedure Component Value Units Date/Time    XR Chest 2 View [[id number]] Collected:  09/10/18 170     Updated:  09/10/18 1726    Narrative:         [facility]    Patient Name: YAMILETH SLATER    ORDERING: RAY BINGHAM     ATTENDING: RAY BINGHAM     REFERRING: RAY BINGHAM    -----------------------    PROCEDURE: Two-view chest    COMPARISON: 2017    HISTORY: sob    FINDINGS: Frontal and lateral views of the chest are obtained.     Devices: None    Lungs/Pleura: Mild pulmonary vascular congestion. The lungs  otherwise appear clear.    Cardiomediastinal structures: Sternal suture wires appear stable.          Impression:       CONCLUSION:    Mild pulmonary vascular congestion.    Electronically signed by:  Umesh Parra MD  9/10/2018 5:24 PM CDT  Workstation: [id number]          Xr Chest 2 View    Result  Date: 9/10/2018  Narrative: [facility] Patient Name: YAMILETH FINK ORDERING: RAY BINGHAM ATTENDING: RAY BINGHAM REFERRING: RAY BINGHAM ----------------------- PROCEDURE: Two-view chest COMPARISON: 8/26/2017 HISTORY: sob FINDINGS: Frontal and lateral views of the chest are obtained. Devices: None Lungs/Pleura: Mild pulmonary vascular congestion. The lungs otherwise appear clear. Cardiomediastinal structures: Sternal suture wires appear stable.     Impression: CONCLUSION:  Mild pulmonary vascular congestion. Electronically signed by:  Umesh Parra MD  9/10/2018 5:24 PM CDT Workstation: [id number]      HISTORY OF PRESENT ILLNESS   Yamileth Fink is a 58 y.o. female presented to the [facility] ED with a complaint of chest pain for four days. She described it as lung pain that was sudden in onset and had been worsening. It was exacerbated by movement, deep inspiration and cough. It is relieved by a heating pad. She was seen last week in the clinic and diagnosed with a viral URI. Concurrent history of CAD s/p CABG in 2013 in addition to her comorbidities and tobacco use played a role in admitting her to rule out a cardiac etiology.     DIAGNOSTIC DATA   Pain worsens with movement, deep inspiration, and cough. It is reproducible upon palpation. EKG showed nonspecific ST and T wave changes in the lateral leads. Troponin negative x 4.     HOSPITAL COURSE   Ms. Fink came to ED where she received MANISHA therapy, Duo-neb, Solumedrol injection, had an EKG negative for current myocardial ischemia, negative troponin, and a CXR showing mild pulmonary vascular congestion. She was admitted for observation. Her symptoms mildly decreased in intensity, and subsequent 3 troponin levels were negative. She received a lidocaine patch, a toradol shot and one dose of lasix 20 mg for a mild congestion noted on CXR. She was then discharged home and asked to follow up with her primary physician  within the next 2 weeks.     Physical Exam   Constitutional: She is oriented to person, place, and time. She appears well-developed and well-nourished.   HENT:   Head: Normocephalic and atraumatic.   Right Ear: External ear normal.   Left Ear: External ear normal.   Eyes: Pupils are equal, round, and reactive to light. EOM are normal.   Neck: Normal range of motion. Neck supple.   Cardiovascular: Normal rate, regular rhythm and normal heart sounds.  Exam reveals no gallop and no friction rub.    No murmur heard.  Pulmonary/Chest: Effort normal and breath sounds normal. No respiratory distress.   Abdominal: Soft. Bowel sounds are normal. She exhibits no distension. There is no tenderness.   Musculoskeletal: Normal range of motion. She exhibits tenderness. She exhibits no edema.        Arms:  Neurological: She is alert and oriented to person, place, and time.   Skin: Skin is warm. No erythema.   Psychiatric: She has a normal mood and affect. Her behavior is normal.      DISCHARGE CONDITION   Stable    DISPOSITION   To Home      DISCHARGE MEDICATIONS        Your medication list      START taking these medications      Instructions Last Dose Given Next Dose Due   lidocaine 5 %  Commonly known as:  LIDODERM  Notes to patient:  For pain       Place 1 patch on the skin as directed by provider Daily. Remove & Discard patch within 12 hours or as directed by MD       nicotine 21 MG/24HR patch  Commonly known as:  NICODERM CQ  Notes to patient:  To help quit smoking       Place 1 patch on the skin as directed by provider Daily.          CONTINUE taking these medications      Instructions Last Dose Given Next Dose Due   albuterol 108 (90 Base) MCG/ACT inhaler  Commonly known as:  PROVENTIL HFA;VENTOLIN HFA      Inhale 2 puffs Every 4 (Four) Hours As Needed for Wheezing.       albuterol 0.63 MG/3ML nebulizer solution  Commonly known as:  ACCUNEB      Take 3 mL by nebulization Every 6 (Six) Hours As Needed for Wheezing.        atorvastatin 20 MG tablet  Commonly known as:  LIPITOR      Take 1 tablet by mouth Daily.       betamethasone valerate 0.1 % cream  Commonly known as:  VALISONE      Apply  topically to the appropriate area as directed Daily.       budesonide-formoterol 160-4.5 MCG/ACT inhaler  Commonly known as:  SYMBICORT      Inhale 2 puffs 2 (Two) Times a Day.       cetirizine 10 MG tablet  Commonly known as:  zyrTEC      Take 1 tablet by mouth Daily.       clopidogrel 75 MG tablet  Commonly known as:  PLAVIX      Take 1 tablet by mouth Daily.       [employer] BLOOD GLUCOSE METER w/Device kit      1 each 3 (Three) Times a Day.       cyclobenzaprine 10 MG tablet  Commonly known as:  FLEXERIL      TAKE 1 TABLET BY MOUTH 2 (TWO) TIMES A DAY AS NEEDED FOR MUSCLE SPASMS.       Empagliflozin 10 MG tablet  Commonly known as:  JARDIANCE      Take 10 mg by mouth Every Morning.       ferrous sulfate 325 (65 FE) MG tablet  Commonly known as:  FERROUSUL      Take 1 tablet by mouth Daily With Breakfast.       gabapentin 800 MG tablet  Commonly known as:  NEURONTIN      Take 1 tablet by mouth 3 (Three) Times a Day. For neuropathy       glucose blood test strip      Use as instructed       ibuprofen 800 MG tablet  Commonly known as:  ADVIL,MOTRIN      Take 1 tablet by mouth Every 6 (Six) Hours As Needed for Mild Pain .       LANTUS SOLOSTAR 100 UNIT/ML injection pen  Generic drug:  Insulin Glargine      Inject 95 Units under the skin into the appropriate area as directed Every 12 (Twelve) Hours.       Insulin Lispro 100 UNIT/ML solution pen-injector  Commonly known as:  HUMALOG KWIKPEN      Inject 45 Units under the skin 3 (Three) Times a Day Before Meals.       Insulin Pen Needle 30G X 8 MM misc  Commonly known as:  NOVOFINE      As directed 4 times daily       ipratropium-albuterol 0.5-2.5 mg/3 ml nebulizer  Commonly known as:  DUO-NEB      Take 3 mL by nebulization 4 (Four) Times a Day. ICD10 code J96.01       Liraglutide 18 MG/3ML solution  pen-injector injection  Commonly known as:  VICTOZA      Inject 1.2 mg under the skin into the appropriate area as directed Every Morning.       lisinopril 10 MG tablet  Commonly known as:  PRINIVIL,ZESTRIL      Take 1 tablet by mouth Daily.       meloxicam 7.5 MG tablet  Commonly known as:  MOBIC      Take 1 tablet by mouth Daily. Start medication on 8/24/18       metFORMIN  MG 24 hr tablet  Commonly known as:  GLUCOPHAGE-XR      TAKE 2 TABLET BY MOUTH 2 (TWO) TIMES A DAY WITH MEALS.       methocarbamol 500 MG tablet  Commonly known as:  ROBAXIN      Take 1 tablet by mouth 4 (Four) Times a Day.       methocarbamol 750 MG tablet  Commonly known as:  ROBAXIN      Take 1 tablet by mouth 4 (Four) Times a Day As Needed for Muscle Spasms.       methylPREDNISolone 4 MG tablet  Commonly known as:  MEDROL      Take 1 tablet by mouth Daily. Please follow instructions on medrol dose zachary       metoprolol tartrate 50 MG tablet  Commonly known as:  LOPRESSOR      Take 1 tablet by mouth Every 12 (Twelve) Hours.       montelukast 10 MG tablet  Commonly known as:  SINGULAIR      Take 1 tablet by mouth Every Night.       nitroglycerin 0.4 MG SL tablet  Commonly known as:  NITROSTAT      Place 1 tablet under the tongue As Needed for Chest Pain. Take no more than 3 doses in 15 minutes.       nystatin 063848 UNIT/GM powder  Commonly known as:  MYCOSTATIN      Apply  topically to the appropriate area as directed 3 (Three) Times a Day.       oxyCODONE-acetaminophen 5-325 MG per tablet  Commonly known as:  PERCOCET      Take 1 tablet by mouth Every 6 (Six) Hours As Needed for Severe Pain .       pantoprazole 40 MG EC tablet  Commonly known as:  PROTONIX      Take 1 tablet by mouth Daily.       potassium chloride 10 MEQ CR tablet  Commonly known as:  K-DUR      TAKE 1 TABLET BY MOUTH EVERY NIGHT.       promethazine 25 MG tablet  Commonly known as:  PHENERGAN      Take 1 tablet by mouth Every 6 (Six) Hours As Needed for Nausea or  Vomiting.             Where to Get Your Medications      These medications were sent to [facility] - [place], [place] - [address] [facility] - [phone]  - [phone] FX  [address] [facility] [address]    Phone:  [phone] ·   lidocaine 5 %  · nicotine 21 MG/24HR patch         INSTRUCTIONS   Activity: Normal     Diet: As before    Special Instructions: Patient instructed to call M.D. or return to ED with worsening shortness of breath, chest pain, fever greater than 100.4°F or any other medical concerns.    FOLLOW UP   Additional Instructions for the Follow-ups that You Need to Schedule     Discharge Follow-up with PCP    As directed      Currently Documented PCP:  Nirmal Calvillo MD  PCP Phone Number:  [phone]    Follow Up Details:  Please follow up within 2 weeks.           Follow-up Information     Nirmal Calvillo MD Follow up.    Specialty:  Family Medicine  Why:  Please follow up within 2 weeks.  Contact information:  [facility] [address] [place] [address] [phone]                  PENDING TEST RESULTS AT DISCHARGE      TIME   Time: 30 minutes were spent planning this discharge.          Dr. Thomson is the attending at time of discharge, She is aware of the patient's status and agrees with the above discharge summary.           This document has been electronically signed by Nirmal Calvillo MD on September 11, 2018 3:56 PM

## 2018-09-11 NOTE — PROGRESS NOTES
FAMILY MEDICINE DAILY PROGRESS NOTE  NAME: Yamileth Slater  : 1960  MRN: [id number]     LOS: 0 days     PROVIDER OF SERVICE: Nirmal Calvillo MD    Chief Complaint: Intercostal pain    Subjective:     Interval History:  History taken from: patient chart  Ms. Slater was seen this morning laying comfortable in bed. She was in no apparen distress and asserts to mild improvement in her left sided chest pain. It is dull in character, non radiating, continues to hurt with inspiration and relicited with deep palpation of the left infracostal area. Troponins have been negative x3. She will receive a lidocaine patch for added pain relief. Glucose is elevated secondary to steroids.     Review of Systems:   Review of Systems   Constitutional: Negative for fatigue and fever.   HENT: Negative for sore throat and trouble swallowing.    Respiratory: Positive for cough. Negative for chest tightness and shortness of breath.    Cardiovascular: Positive for chest pain. Negative for leg swelling.   Gastrointestinal: Negative for abdominal distention and abdominal pain.   Genitourinary: Negative for difficulty urinating and dysuria.   Musculoskeletal: Negative for arthralgias and myalgias.   Skin: Negative for color change and pallor.   Neurological: Negative for light-headedness and headaches.   Psychiatric/Behavioral: Negative for agitation and behavioral problems.       Objective:     Vital Signs  Temp:  [96.4 °F (35.8 °C)-96.9 °F (36.1 °C)] 96.4 °F (35.8 °C)  Heart Rate:  [76-94] 84  Resp:  [18-20] 20  BP: (138-190)/(65-92) 150/69    Physical Exam  Physical Exam   Constitutional: She is oriented to person, place, and time. She appears well-developed and well-nourished.   HENT:   Head: Normocephalic and atraumatic.   Right Ear: External ear normal.   Left Ear: External ear normal.   Eyes: Pupils are equal, round, and reactive to light. EOM are normal.   Neck: Normal range of motion. Neck supple.   Cardiovascular:  Normal rate, regular rhythm and normal heart sounds.  Exam reveals no gallop and no friction rub.    No murmur heard.  Pulmonary/Chest: Effort normal and breath sounds normal. No respiratory distress. She has no wheezes. She has no rales.   Abdominal: Soft. Bowel sounds are normal. She exhibits no distension. There is no tenderness.   Musculoskeletal: Normal range of motion. She exhibits tenderness. She exhibits no edema.        Arms:  Neurological: She is alert and oriented to person, place, and time.   Skin: Skin is warm. Capillary refill takes less than 2 seconds. No erythema.   Psychiatric: She has a normal mood and affect. Her behavior is normal.       Medication Review    Current Facility-Administered Medications:   •  acetaminophen (TYLENOL) tablet 650 mg, 650 mg, Oral, Q4H PRN, Marci Johns MD  •  albuterol (PROVENTIL) nebulizer solution 0.083% 2.5 mg/3mL, 0.63 mg, Nebulization, Q6H PRN, Marci Johns MD  •  atorvastatin (LIPITOR) tablet 20 mg, 20 mg, Oral, Daily, Marci Johns MD  •  budesonide-formoterol (SYMBICORT) 160-4.5 MCG/ACT inhaler 2 puff, 2 puff, Inhalation, BID - RT, Marci Johns MD  •  cetirizine (zyrTEC) tablet 10 mg, 10 mg, Oral, Daily, Marci Johns MD  •  clopidogrel (PLAVIX) tablet 75 mg, 75 mg, Oral, Daily, Marci Johns MD  •  dextrose (D50W) 25 g/ 50mL Intravenous Solution 25 g, 25 g, Intravenous, Q15 Min PRN, Marci Johns MD  •  dextrose (GLUTOSE) oral gel 15 g, 15 g, Oral, Q15 Min PRN, Marci Johns MD  •  ferrous sulfate EC tablet 324 mg, 324 mg, Oral, Daily With Breakfast, Marci Johns MD, 324 mg at 09/11/18 0723  •  gabapentin (NEURONTIN) capsule 800 mg, 800 mg, Oral, Q8H, Marci Johns MD, 800 mg at 09/11/18 0629  •  glucagon (human recombinant) (GLUCAGEN DIAGNOSTIC) injection 1 mg, 1 mg, Subcutaneous, PRN, Nat, Marci Khalil MD  •  insulin aspart (novoLOG) injection 0-24 Units, 0-24 Units,  Subcutaneous, 4x Daily AC & at Bedtime, Marci Johns MD, 12 Units at 09/11/18 0722  •  insulin aspart (novoLOG) injection 45 Units, 45 Units, Subcutaneous, TID With Meals, Marci Johns MD  •  insulin detemir (LEVEMIR) injection 95 Units, 95 Units, Subcutaneous, Q12H, Marci Johns MD, 95 Units at 09/11/18 0101  •  lidocaine (LIDODERM) 5 % 1 patch, 1 patch, Transdermal, Q24H, Marci Johns MD, 1 patch at 09/11/18 0723  •  lisinopril (PRINIVIL,ZESTRIL) tablet 10 mg, 10 mg, Oral, Daily, Marci Johns MD  •  magnesium sulfate in D5W 1g/100mL (PREMIX), 1 g, Intravenous, Once, Marci Johns MD  •  metoprolol tartrate (LOPRESSOR) tablet 50 mg, 50 mg, Oral, Q12H, Marci Johns MD, 50 mg at 09/11/18 0101  •  montelukast (SINGULAIR) tablet 10 mg, 10 mg, Oral, Nightly, Marci Johns MD, 10 mg at 09/11/18 0101  •  nicotine (NICODERM CQ) 21 MG/24HR patch 1 patch, 1 patch, Transdermal, Q24H, Marci Johns MD, 1 patch at 09/11/18 0103  •  nitroglycerin (NITROSTAT) SL tablet 0.4 mg, 0.4 mg, Sublingual, PRN, Marci Johns MD  •  nystatin (MYCOSTATIN) powder, , Topical, TID, Marci Johns MD  •  ondansetron (ZOFRAN) tablet 4 mg, 4 mg, Oral, Q6H PRN **OR** ondansetron ODT (ZOFRAN-ODT) disintegrating tablet 4 mg, 4 mg, Oral, Q6H PRN **OR** ondansetron (ZOFRAN) injection 4 mg, 4 mg, Intravenous, Q6H PRN, Marci Johns MD  •  pantoprazole (PROTONIX) EC tablet 40 mg, 40 mg, Oral, Daily, Marci Johns MD  •  sennosides-docusate sodium (SENOKOT-S) 8.6-50 MG tablet 2 tablet, 2 tablet, Oral, Nightly PRN, Marci Johns MD  •  sodium chloride 0.9 % flush 1-10 mL, 1-10 mL, Intravenous, PRN, Marci Johns MD     Diagnostic Data    Lab Results (last 24 hours)     Procedure Component Value Units Date/Time    POC Glucose Once [[id number]]  (Abnormal) Collected:  09/11/18 0623    Specimen:  Blood Updated:  09/11/18 0658     Glucose  272 (H) mg/dL      Comment: RN NotifiedOperator: [id number] [place] K[place] ID: [id number]       Basic Metabolic Panel [[id number]]  (Abnormal) Collected:  09/11/18 0452    Specimen:  Blood Updated:  09/11/18 0545     Glucose 253 (H) mg/dL      BUN 22 (H) mg/dL      Creatinine 1.08 (H) mg/dL      Sodium 138 mmol/L      Potassium 4.7 mmol/L      Chloride 103 mmol/L      CO2 25.0 mmol/L      Calcium 8.5 mg/dL      eGFR Non African Amer 52 mL/min/1.73      BUN/Creatinine Ratio 20.4     Anion Gap 10.0 mmol/L     Troponin [[id number]]  (Normal) Collected:  09/11/18 0452    Specimen:  Blood Updated:  09/11/18 0541     Troponin I <0.012 ng/mL     CBC & Differential [[id number]] Collected:  09/11/18 0452    Specimen:  Blood Updated:  09/11/18 0527    Narrative:       The following orders were created for panel order CBC & Differential.  Procedure                               Abnormality         Status                     ---------                               -----------         ------                     CBC Auto Differential[[id number]]        Abnormal            Final result                 Please view results for these tests on the individual orders.    CBC Auto Differential [[id number]]  (Abnormal) Collected:  09/11/18 0452    Specimen:  Blood Updated:  09/11/18 0527     WBC 8.10 10*3/mm3      RBC 4.91 10*6/mm3      Hemoglobin 13.3 g/dL      Hematocrit 40.6 %      MCV 82.7 fL      MCH 27.1 pg      MCHC 32.8 g/dL      RDW 16.8 (H) %      RDW-SD 50.1 (H) fl      MPV 9.3 fL      Platelets 243 10*3/mm3      Neutrophil % 83.5 (H) %      Lymphocyte % 15.4 %      Monocyte % 0.5 %      Eosinophil % 0.1 %      Basophil % 0.1 %      Immature Grans % 0.4 %      Neutrophils, Absolute 6.76 10*3/mm3      Lymphocytes, Absolute 1.25 10*3/mm3      Monocytes, Absolute 0.04 10*3/mm3      Eosinophils, Absolute 0.01 10*3/mm3      Basophils, Absolute 0.01 10*3/mm3      Immature Grans, Absolute 0.03 (H) 10*3/mm3     CRE Screen by PCR -  Swab, Large Intestine, Rectum [[id number]] Collected:  09/11/18 0113    Specimen:  Swab from Large Intestine, Rectum Updated:  09/11/18 0312     CRE SCREEN Not Detected     Comment: Test performed by real-time polymerase chain reaction (qPCR).        OXA 48 Strain Not Detected     IMP STRAIN Not Detected     VIM STRAIN Not Detected     NDM Strain Not Detected     KPC Strain Not Detected    POC Glucose Once [[id number]]  (Abnormal) Collected:  09/11/18 0100    Specimen:  Blood Updated:  09/11/18 0120     Glucose 262 (H) mg/dL      Comment: Sliding Scale AdminOperator: [id number] [place] KYLEMeter ID: [id number]       Troponin [[id number]]  (Normal) Collected:  09/10/18 2359    Specimen:  Blood Updated:  09/11/18 0043     Troponin I <0.012 ng/mL     Magnesium [[id number]]  (Abnormal) Collected:  09/10/18 1745    Specimen:  Blood Updated:  09/11/18 0018     Magnesium 1.5 (L) mg/dL     Hemoglobin A1c [[id number]] Collected:  09/10/18 1745    Specimen:  Blood Updated:  09/11/18 0000    Lactic Acid, Reflex [[id number]]  (Normal) Collected:  09/10/18 2140    Specimen:  Blood from Arm, Left Updated:  09/10/18 2159     Lactate 1.3 mmol/L     Lactic Acid, Reflex Timer (This will reflex a repeat order 3-3:15 hours after ordered.) [[id number]] Collected:  09/10/18 1745    Specimen:  Blood Updated:  09/10/18 2131     Extra Tube Hold for add-ons.     Comment: Auto resulted.       [place] Draw [[id number]] Collected:  09/10/18 1745    Specimen:  Blood Updated:  09/10/18 1900    Narrative:       The following orders were created for panel order [place] Draw.  Procedure                               Abnormality         Status                     ---------                               -----------         ------                     Light Blue Top[[id number]]                                   Final result               Green Top (Gel)[[id number]]                                  Final result               Lavender Top[[id number]]                                      Final result               Gold Top - SST[[id number]]                                   Final result                 Please view results for these tests on the individual orders.    Light Blue Top [[id number]] Collected:  09/10/18 1745    Specimen:  Blood Updated:  09/10/18 1900     Extra Tube hold for add-on     Comment: Auto resulted       Green Top (Gel) [[id number]] Collected:  09/10/18 1745    Specimen:  Blood Updated:  09/10/18 1900     Extra Tube Hold for add-ons.     Comment: Auto resulted.       Lavender Top [[id number]] Collected:  09/10/18 1745    Specimen:  Blood Updated:  09/10/18 1900     Extra Tube hold for add-on     Comment: Auto resulted       Gold Top - SST [[id number]] Collected:  09/10/18 1745    Specimen:  Blood Updated:  09/10/18 1900     Extra Tube Hold for add-ons.     Comment: Auto resulted.       BNP [[id number]]  (Normal) Collected:  09/10/18 1745    Specimen:  Blood Updated:  09/10/18 1838     proBNP 150.0 pg/mL     Troponin [[id number]]  (Normal) Collected:  09/10/18 1745    Specimen:  Blood Updated:  09/10/18 1838     Troponin I <0.012 ng/mL     Lactic Acid, Plasma [[id number]]  (Abnormal) Collected:  09/10/18 1745    Specimen:  Blood Updated:  09/10/18 1827     Lactate 2.1 (C) mmol/L     Comprehensive Metabolic Panel [[id number]]  (Abnormal) Collected:  09/10/18 1745    Specimen:  Blood Updated:  09/10/18 1821     Glucose 118 (H) mg/dL      BUN 16 mg/dL      Creatinine 0.95 mg/dL      Sodium 138 mmol/L      Potassium 3.8 mmol/L      Chloride 103 mmol/L      CO2 27.0 mmol/L      Calcium 8.7 mg/dL      Total Protein 7.5 g/dL      Albumin 3.80 g/dL      ALT (SGPT) 33 U/L      AST (SGOT) 31 U/L      Alkaline Phosphatase 152 (H) U/L      Total Bilirubin 0.3 mg/dL      eGFR Non African Amer 60 mL/min/1.73      Globulin 3.7 (H) gm/dL      A/G Ratio 1.0 (L) g/dL      BUN/Creatinine Ratio 16.8     Anion Gap 8.0 mmol/L     CBC & Differential [[id number]] Collected:  09/10/18 1745     Specimen:  Blood Updated:  09/10/18 1757    Narrative:       The following orders were created for panel order CBC & Differential.  Procedure                               Abnormality         Status                     ---------                               -----------         ------                     CBC Auto Differential[[id number]]        Abnormal            Final result                 Please view results for these tests on the individual orders.    CBC Auto Differential [[id number]]  (Abnormal) Collected:  09/10/18 1745    Specimen:  Blood Updated:  09/10/18 1757     WBC 10.92 (H) 10*3/mm3      RBC 4.91 10*6/mm3      Hemoglobin 13.4 g/dL      Hematocrit 41.0 %      MCV 83.5 fL      MCH 27.3 pg      MCHC 32.7 g/dL      RDW 16.8 (H) %      RDW-SD 50.4 (H) fl      MPV 8.9 fL      Platelets 240 10*3/mm3      Neutrophil % 53.5 %      Lymphocyte % 36.8 %      Monocyte % 5.1 %      Eosinophil % 3.6 %      Basophil % 0.5 %      Immature Grans % 0.5 %      Neutrophils, Absolute 5.85 10*3/mm3      Lymphocytes, Absolute 4.02 10*3/mm3      Monocytes, Absolute 0.56 10*3/mm3      Eosinophils, Absolute 0.39 10*3/mm3      Basophils, Absolute 0.05 10*3/mm3      Immature Grans, Absolute 0.05 (H) 10*3/mm3            Imaging Results (last 24 hours)     Procedure Component Value Units Date/Time    XR Chest 2 View [[id number]] Collected:  09/10/18 1704     Updated:  09/10/18 1726    Narrative:         [facility] [place]    Patient Name: DALE FINK    ORDERING: RAY BINGHAM     ATTENDING: RAY BINGHAM     REFERRING: RAY BINGHAM    -----------------------    PROCEDURE: Two-view chest    COMPARISON: 8/26/2017    HISTORY: sob    FINDINGS: Frontal and lateral views of the chest are obtained.     Devices: None    Lungs/Pleura: Mild pulmonary vascular congestion. The lungs  otherwise appear clear.    Cardiomediastinal structures: Sternal suture wires appear stable.          Impression:       CONCLUSION:    Mild  pulmonary vascular congestion.    Electronically signed by:  Umesh Parra MD  9/10/2018 5:24 PM CDT  Workstation: [id number]          I reviewed the patient's new clinical results.    Assessment/Plan:     Hospital Problem List     * (Principal)Intercostal pain    Overview Addendum 9/11/2018  7:24 AM by Nirmal Calvillo MD     -Monitoring cardiac activity with telemetry  -Trending cardiac enzyme, troponin; tropinins negative x3  -Will provide patient with sublingual nitroglycerin as needed  -Will provide patient with lidocaine patch for possible musculoskeletal pain as elicited with physical exam  -Encourage smoking cessation and incentive spirometry for possible pleuritic pain         Type 2 diabetes mellitus with diabetic polyneuropathy, with long-term current use of insulin (CMS/[facility])    Overview Addendum 9/11/2018 12:38 AM by Marci Johns MD     -Lasting hemoglobin A1c was at goal, 6.1%  -We'll continue insulin regimen: 95 units of basal insulin BID and 45 units of short-acting insulin with meals  -Holding metformin in the event of imaging during hospital course  -Holding Jardiance and Victoza as it is not on formulary  -Will monitor glucose with regular fingersticks and provide sliding scale insulin  -We'll continue gabapentin for diabetic neuropathy; JULIAN report ([id number]) was obtained and is consistent with patient's reported medications         Chronic obstructive pulmonary disease (CMS/[facility])    Overview Addendum 9/11/2018 12:40 AM by Marci Johns MD     -Continue prescribed Symbicort; patient reports that she is only taking it once a day; will educate on proper dosing  -Continue Singulair 10 mg by mouth nightly  -We'll provide duo nebs 4 times a day while awake and albuterol as needed for wheezing and shortness of air  -We'll provide supplemental oxygen as needed in order to maintain saturation greater than 90%  -Encourage incentive spirometry and oscillating positive expiratory pressure  device  -Encourage smoking cessation         Tobacco dependence syndrome    Overview Addendum 9/11/2018 12:41 AM by Marci Johns MD     -Patient has a one pack per day for 40+ year smoking history  -We'll encourage smoking cessation  -We'll provide nicotine replacement therapy (patch); patient reports that OTC nicotine patches have irritating adhesive and prefers the hospital brand         Morbid obesity with BMI of 50.0-59.9, adult (CMS/[facility])    Overview Addendum 9/11/2018 12:42 AM by Marci Johns MD     -Body mass index is 52.13 kg/m².   -Consult dietary/nutrition in order to educate patient  -Patient is interested in bariatric surgery; encouraged outpatient follow-up with PCP         Coronary artery disease involving native coronary artery of native heart without angina pectoris    Overview Addendum 9/11/2018 12:49 AM by Marci Johns MD     -S/p CABG X3 in 2013  -Continue dual antiplatelet therapy: ASA and Plavix  -Continue statin therapy, Atorvastatin  -Continue antihypertensive therapy, Lopressor                 DVT prophylaxis: SCDs/TEDs  Code Status and Medical Interventions:   Ordered at: 09/10/18 8053     Level Of Support Discussed With:    Patient     Code Status:    CPR     Medical Interventions (Level of Support Prior to Arrest):    Full       Plan for disposition:Later today      Time: 15 min           This document has been electronically signed by Nirmal Calvillo MD on September 11, 2018 7:24 AM

## 2018-09-11 NOTE — CONSULTS
Adult Nutrition  Assessment    Patient Name:  Yamileth Slater  YOB: 1960  MRN: [id number]  Admit Date:  9/10/2018    Assessment Date:  9/11/2018    Comments:  BMI 52 with pt at 175% IBW.  Making Lifestyle Choices for a Healthier Weight used to provide ed regarding Wt Loss Diet and diet copy given.          Reason for Assessment     Row Name 09/11/18 1301          Reason for Assessment    Reason For Assessment per organizational policy   Wt Loss Diet ed     Diagnosis other (see comments)   dx Obesity     Identified At Risk by Screening Criteria BMI;need for education                       Estimated/Assessed Needs     Row Name 09/11/18 1302          Calculation Measurements    Weight Used For Calculations 69.8 kg (153 lb 14.1 oz)        Estimated/Assessed Needs    Additional Documentation Calorie Requirements (Group);Fluid Requirements (Group);Austin-St. Jeor Equation (Group);Protein Requirements (Group)        Calorie Requirements    Estimated Calorie Requirement (kcal/day) 1594     Estimated Calorie Need Method Austin-St Jeor        KCAL/KG    14 Kcal/Kg (kcal) 977.2     15 Kcal/Kg (kcal) 1047     18 Kcal/Kg (kcal) 1256.4     20 Kcal/Kg (kcal) 1396     25 Kcal/Kg (kcal) 1745     30 Kcal/Kg (kcal) 2094     35 Kcal/Kg (kcal) 2443     40 Kcal/Kg (kcal) 2792     45 Kcal/Kg (kcal) 3141     50 Kcal/Kg (kcal) 3490        Austin-St. Jeor Equation    RMR (Austin-St. Jeor Equation) 1226.25        Protein Requirements    Est Protein Requirement Amount (gms/kg) 1.2 gm protein     Estimated Protein Requirements (gms/day) 83.76        Fluid Requirements    Estimated Fluid Requirements (mL/day) 2096     RDA Method (mL) 2096     [place]ar Method (over 20 kg) 2896               Evaluation of Received Nutrient/Fluid Intake     Row Name 09/11/18 1302          Calculation Measurements    Weight Used For Calculations 69.8 kg (153 lb 14.1 oz)             Evaluation of Prescribed Nutrient/Fluid Intake     Row  Name 09/11/18 1302          Calculation Measurements    Weight Used For Calculations 69.8 kg (153 lb 14.1 oz)           Electronically signed by:  Ratna Tomlin RD  09/11/18 1:06 PM

## 2018-09-12 ENCOUNTER — READMISSION MANAGEMENT (OUTPATIENT)
Dept: CALL CENTER | Facility: HOSPITAL | Age: 58
End: 2018-09-12

## 2018-09-12 ENCOUNTER — TELEPHONE (OUTPATIENT)
Dept: FAMILY MEDICINE CLINIC | Facility: CLINIC | Age: 58
End: 2018-09-12

## 2018-09-12 NOTE — OUTREACH NOTE
Prep Survey      Responses   Facility patient discharged from?  [place]   Is patient eligible?  Yes   Discharge diagnosis  Intercostal pain, IDDM with diabetic polyneuropathy, COPD, tobacco dependence syndrome, morbid obesity BMI 50.-59.9m , CAD without angina pectoris   Does the patient have one of the following disease processes/diagnoses(primary or secondary)?  COPD/Pneumonia   Does the patient have Home health ordered?  No   Is there a DME ordered?  No   Comments regarding appointments  See AVS   Prep survey completed?  Yes          Dayna Mathew RN

## 2018-09-12 NOTE — TELEPHONE ENCOUNTER
[facility] called, said that they have sent over orders for Back, Knee and shoulder support braces on September 5 September 7 September 11    They have not heard anything back from this, and wanted to know if someone could please call them about this at [phone]  Ref # [id number]        Thanks

## 2018-09-13 ENCOUNTER — EPISODE CHANGES (OUTPATIENT)
Dept: CASE MANAGEMENT | Facility: OTHER | Age: 58
End: 2018-09-13

## 2018-09-13 ENCOUNTER — APPOINTMENT (OUTPATIENT)
Dept: GENERAL RADIOLOGY | Facility: HOSPITAL | Age: 58
End: 2018-09-13

## 2018-09-13 ENCOUNTER — HOSPITAL ENCOUNTER (EMERGENCY)
Facility: HOSPITAL | Age: 58
Discharge: HOME | End: 2018-09-13
Attending: EMERGENCY MEDICINE | Admitting: FAMILY MEDICINE

## 2018-09-13 VITALS
RESPIRATION RATE: 24 BRPM | DIASTOLIC BLOOD PRESSURE: 80 MMHG | HEIGHT: 62 IN | WEIGHT: 293 LBS | SYSTOLIC BLOOD PRESSURE: 174 MMHG | HEART RATE: 78 BPM | BODY MASS INDEX: 53.92 KG/M2 | OXYGEN SATURATION: 94 % | TEMPERATURE: 98.3 F

## 2018-09-13 DIAGNOSIS — R07.89 CHEST WALL PAIN: Primary | ICD-10-CM

## 2018-09-13 LAB
ALBUMIN SERPL-MCNC: 3.3 G/DL (ref 3.4–4.8)
ALBUMIN/GLOB SERPL: 1 G/DL (ref 1.1–1.8)
ALP SERPL-CCNC: 131 U/L (ref 38–126)
ALT SERPL W P-5'-P-CCNC: 36 U/L (ref 9–52)
ANION GAP SERPL CALCULATED.3IONS-SCNC: 10 MMOL/L (ref 5–15)
AST SERPL-CCNC: 37 U/L (ref 14–36)
BASOPHILS # BLD AUTO: 0.05 10*3/MM3 (ref 0–0.2)
BASOPHILS NFR BLD AUTO: 0.6 % (ref 0–2)
BILIRUB SERPL-MCNC: 0.2 MG/DL (ref 0.2–1.3)
BUN BLD-MCNC: 27 MG/DL (ref 7–21)
BUN/CREAT SERPL: 27 (ref 7–25)
CALCIUM SPEC-SCNC: 8.8 MG/DL (ref 8.4–10.2)
CHLORIDE SERPL-SCNC: 103 MMOL/L (ref 95–110)
CO2 SERPL-SCNC: 25 MMOL/L (ref 22–31)
CREAT BLD-MCNC: 1 MG/DL (ref 0.5–1)
DEPRECATED RDW RBC AUTO: 51.3 FL (ref 36.4–46.3)
EOSINOPHIL # BLD AUTO: 0.33 10*3/MM3 (ref 0–0.7)
EOSINOPHIL NFR BLD AUTO: 3.9 % (ref 0–7)
ERYTHROCYTE [DISTWIDTH] IN BLOOD BY AUTOMATED COUNT: 16.7 % (ref 11.5–14.5)
GFR SERPL CREATININE-BSD FRML MDRD: 57 ML/MIN/1.73 (ref 51–120)
GLOBULIN UR ELPH-MCNC: 3.4 GM/DL (ref 2.3–3.5)
GLUCOSE BLD-MCNC: 159 MG/DL (ref 60–100)
HCT VFR BLD AUTO: 39.6 % (ref 35–45)
HGB BLD-MCNC: 12.9 G/DL (ref 12–15.5)
HOLD SPECIMEN: NORMAL
HOLD SPECIMEN: NORMAL
IMM GRANULOCYTES # BLD: 0.05 10*3/MM3 (ref 0–0.02)
IMM GRANULOCYTES NFR BLD: 0.6 % (ref 0–0.5)
INR PPP: 0.92 (ref 0.8–1.2)
LYMPHOCYTES # BLD AUTO: 3.54 10*3/MM3 (ref 0.6–4.2)
LYMPHOCYTES NFR BLD AUTO: 41.8 % (ref 10–50)
MCH RBC QN AUTO: 27.4 PG (ref 26.5–34)
MCHC RBC AUTO-ENTMCNC: 32.6 G/DL (ref 31.4–36)
MCV RBC AUTO: 84.3 FL (ref 80–98)
MONOCYTES # BLD AUTO: 0.61 10*3/MM3 (ref 0–0.9)
MONOCYTES NFR BLD AUTO: 7.2 % (ref 0–12)
NEUTROPHILS # BLD AUTO: 3.89 10*3/MM3 (ref 2–8.6)
NEUTROPHILS NFR BLD AUTO: 45.9 % (ref 37–80)
NT-PROBNP SERPL-MCNC: 168 PG/ML (ref 0–900)
PLATELET # BLD AUTO: 239 10*3/MM3 (ref 150–450)
PMV BLD AUTO: 9 FL (ref 8–12)
POTASSIUM BLD-SCNC: 4.3 MMOL/L (ref 3.5–5.1)
PROT SERPL-MCNC: 6.7 G/DL (ref 6.3–8.6)
PROTHROMBIN TIME: 12.2 SECONDS (ref 11.1–15.3)
RBC # BLD AUTO: 4.7 10*6/MM3 (ref 3.77–5.16)
SODIUM BLD-SCNC: 138 MMOL/L (ref 137–145)
TROPONIN I SERPL-MCNC: <0.012 NG/ML
TROPONIN I SERPL-MCNC: <0.012 NG/ML
WBC NRBC COR # BLD: 8.47 10*3/MM3 (ref 3.2–9.8)
WHOLE BLOOD HOLD SPECIMEN: NORMAL
WHOLE BLOOD HOLD SPECIMEN: NORMAL

## 2018-09-13 PROCEDURE — 93010 ELECTROCARDIOGRAM REPORT: CPT | Performed by: INTERNAL MEDICINE

## 2018-09-13 PROCEDURE — 99284 EMERGENCY DEPT VISIT MOD MDM: CPT

## 2018-09-13 PROCEDURE — 93005 ELECTROCARDIOGRAM TRACING: CPT | Performed by: EMERGENCY MEDICINE

## 2018-09-13 PROCEDURE — 36415 COLL VENOUS BLD VENIPUNCTURE: CPT | Performed by: PHYSICIAN ASSISTANT

## 2018-09-13 PROCEDURE — 80053 COMPREHEN METABOLIC PANEL: CPT | Performed by: PHYSICIAN ASSISTANT

## 2018-09-13 PROCEDURE — 71046 X-RAY EXAM CHEST 2 VIEWS: CPT

## 2018-09-13 PROCEDURE — 84484 ASSAY OF TROPONIN QUANT: CPT | Performed by: PHYSICIAN ASSISTANT

## 2018-09-13 PROCEDURE — 85610 PROTHROMBIN TIME: CPT | Performed by: PHYSICIAN ASSISTANT

## 2018-09-13 PROCEDURE — 85025 COMPLETE CBC W/AUTO DIFF WBC: CPT | Performed by: PHYSICIAN ASSISTANT

## 2018-09-13 PROCEDURE — 83880 ASSAY OF NATRIURETIC PEPTIDE: CPT | Performed by: PHYSICIAN ASSISTANT

## 2018-09-13 RX ORDER — HYDROCODONE BITARTRATE AND ACETAMINOPHEN 7.5; 325 MG/1; MG/1
1 TABLET ORAL ONCE
Status: COMPLETED | OUTPATIENT
Start: 2018-09-13 | End: 2018-09-13

## 2018-09-13 RX ORDER — SODIUM CHLORIDE 0.9 % (FLUSH) 0.9 %
10 SYRINGE (ML) INJECTION AS NEEDED
Status: DISCONTINUED | OUTPATIENT
Start: 2018-09-13 | End: 2018-09-13 | Stop reason: HOSPADM

## 2018-09-13 RX ADMIN — HYDROCODONE BITARTRATE AND ACETAMINOPHEN 1 TABLET: 7.5; 325 TABLET ORAL at 19:30

## 2018-09-13 NOTE — ED PROVIDER NOTES
"Subjective   Patient presents to emergency department for left sided chest wall pain.  States she was admitted recently and stayed overnight for \"fluid on my lungs\".  Since then she has developed pain to left anterior lateral chest wall.  And states it hurts when she presses on it.  She is concerned she has more fluid on her lungs.  She was admitted 9/10/2018 for chest pain and discharged the next morning.          History provided by:  Patient   used: No    Chest Pain   Pain location:  L chest and L lateral chest  Pain quality: aching and sharp    Pain radiates to:  Does not radiate  Pain severity:  Moderate  Onset quality:  Sudden  Duration:  2 weeks  Timing:  Constant  Progression:  Worsening  Chronicity:  New  Context: at rest    Associated symptoms: cough, nausea and shortness of breath    Associated symptoms: no abdominal pain, no altered mental status, no anxiety, no back pain, no claudication, no diaphoresis, no dizziness, no dysphagia, no fever, no syncope, no vomiting and no weakness    Shortness of Breath   Associated symptoms: chest pain and cough    Associated symptoms: no abdominal pain, no claudication, no diaphoresis, no fever, no sore throat, no syncope and no vomiting        Review of Systems   Constitutional: Negative for diaphoresis and fever.   HENT: Negative for sore throat and trouble swallowing.    Eyes: Negative for visual disturbance.   Respiratory: Positive for cough and shortness of breath.    Cardiovascular: Positive for chest pain. Negative for claudication and syncope.   Gastrointestinal: Positive for nausea. Negative for abdominal pain and vomiting.   Genitourinary: Negative for dysuria and flank pain.   Musculoskeletal: Negative for back pain.   Skin: Negative for color change.   Allergic/Immunologic: Negative for immunocompromised state.   Neurological: Negative for dizziness and weakness.   Hematological: Does not bruise/bleed easily.   Psychiatric/Behavioral: " Negative for confusion.       Past Medical History:   Diagnosis Date   • Anxiety states    • Asthma    • Carotid artery stenosis     DWIGHT 0-15%, LICA 0-15%. LICA stent 5/2014.   • Chronic folliculitis    • Chronic obstructive lung disease (CMS/[facility])    • Coronary arteriosclerosis    • Diabetes mellitus (CMS/[facility])     no retinopathy; A1C 9.1      • Dyslipidemia    • Essential hypertension    • Excoriated eczema     asteosis/keratosis   • GERD (gastroesophageal reflux disease)    • History of colon polyps    • Hypertensive disorder    • Kidney stone    • Mixed hyperlipidemia    • Morbid obesity due to excess calories (CMS/[facility])     BMI 44.5   • Neurologic disorder associated with diabetes mellitus (CMS/[facility])    • Non-healing surgical wound     LLE EVHa   • Peripheral vascular disease (CMS/[facility])    • Sleep apnea    • Tobacco dependence syndrome     1/2ppd      • Type 2 diabetes mellitus (CMS/[facility])    • Vitamin D deficiency        Allergies   Allergen Reactions   • Other      Bandaids, MRSA, SURECLOSE       Past Surgical History:   Procedure Laterality Date   • CARDIAC CATHETERIZATION  08/09/2013    Severe multivessel CAD with critical lesions noted in the RCA, obtuse marginal two, ramus intermemdious, and LAD as described above. Normal LV systolic function with no wall motion abnormality.    • CARDIAC CATHETERIZATION  05/27/2014     LEFT Carotid Stent    • CAROTID STENT Left 05/27/2014   • COLONOSCOPY  05/02/2016    Normal colon.Diverticulosis in the sigmoid colon.No specimens collected.    • CORONARY ARTERY BYPASS GRAFT  11/15/2013    CABG x 3 with LIMA to LAD and coronary endarterectomy to LAD, SVG to Ramus intermedius branch and SVG to PDA.    • CYSTOSCOPY URETEROSCOPY LASER LITHOTRIPSY     • ENDOSCOPY  09/23/2015     Esophageal stricture present.Normal stomach.Normal duodenum.    • ENDOSCOPY  11/16/2015    w/ dilatation - Esophageal stricture present,Dilatation performed.Normal stomach and duodenum.    • HYSTERECTOMY    "  • INCISION AND DRAINAGE ABSCESS  01/02/2016    Incision and drainage of right perineal abscess.    • INCISION AND DRAINAGE ABSCESS  02/18/2014    Incision and Drainage of abscess of left lower leg    • OTHER SURGICAL HISTORY  01/25/2016    DESTRUCTION OF BENIGN LESION      • OTHER SURGICAL HISTORY  04/18/2014    ear/neck - L attempted CEA, Neck Dissection    • TUBAL ABDOMINAL LIGATION         Family History   Problem Relation Age of Onset   • Heart disease Mother    • Cancer Mother    • Heart disease Father    • Heart disease Sister    • Cancer Sister    • Heart disease Brother        Social History     Social History   • Marital status:      Spouse name: wesley dumont     Social History Main Topics   • Smoking status: Current Every Day Smoker     Packs/day: 1.00     Years: 40.00     Types: Cigarettes   • Smokeless tobacco: Never Used   • Alcohol use No   • Drug use: Yes     Types: LSD, Marijuana, Methamphetamines      Comment: Lasted used in 2006   • Sexual activity: Not Currently     Partners: Male     Other Topics Concern   • Not on file           Objective      /67 (BP Location: Left arm, Patient Position: Lying)   Pulse 82   Temp 98.3 °F (36.8 °C) (Oral)   Resp 24   Ht 157.5 cm (62\")   Wt 134 kg (294 lb 6 oz)   LMP  (LMP Unknown)   SpO2 94%   BMI 53.84 kg/m²     Physical Exam   Constitutional: She is oriented to person, place, and time. She appears well-developed and well-nourished. No distress.   HENT:   Head: Normocephalic and atraumatic.   Eyes: Conjunctivae are normal.   Cardiovascular: Normal rate, regular rhythm, normal heart sounds and intact distal pulses.    Pulmonary/Chest: Effort normal. No respiratory distress. She has no wheezes.               Diminished     Abdominal: Soft. She exhibits no distension. There is no tenderness.   Musculoskeletal: She exhibits tenderness (left thoracic chest wall). She exhibits no edema.   Neurological: She is alert and oriented to person, place, " and time.   Skin: Skin is warm. Capillary refill takes less than 2 seconds.   Psychiatric: She has a normal mood and affect. Her behavior is normal. Thought content normal.   Nursing note and vitals reviewed.      ECG 12 Lead    Date/Time: 9/13/2018 6:50 PM  Performed by: BEBETO WILSON  Authorized by: TORSTEN MASSEY   Interpreted by physician  Comparison: compared with previous ECG from 9/10/2018  Similar to previous ECG  Rhythm: sinus rhythm  ST Segments: ST segments normal  Other findings: prolonged QTc interval  Clinical impression: abnormal ECG                 ED Course  ED Course as of Sep 13 1901   Thu Sep 13, 2018   1858 Discussed results the patient.  Admission and discharge.  Patient states current chest pain is tenderness with palpation and not chest pain she experienced 2 days ago.  Stable discharge home.  [HENRIQUE]      ED Course User Index  [HENRIQUE] Bebeto Wilson, JAZIMNE      Results for orders placed or performed during the hospital encounter of 09/13/18   Troponin   Result Value Ref Range    Troponin I <0.012 <=0.034 ng/mL   Comprehensive Metabolic Panel   Result Value Ref Range    Glucose 159 (H) 60 - 100 mg/dL    BUN 27 (H) 7 - 21 mg/dL    Creatinine 1.00 0.50 - 1.00 mg/dL    Sodium 138 137 - 145 mmol/L    Potassium 4.3 3.5 - 5.1 mmol/L    Chloride 103 95 - 110 mmol/L    CO2 25.0 22.0 - 31.0 mmol/L    Calcium 8.8 8.4 - 10.2 mg/dL    Total Protein 6.7 6.3 - 8.6 g/dL    Albumin 3.30 (L) 3.40 - 4.80 g/dL    ALT (SGPT) 36 9 - 52 U/L    AST (SGOT) 37 (H) 14 - 36 U/L    Alkaline Phosphatase 131 (H) 38 - 126 U/L    Total Bilirubin 0.2 0.2 - 1.3 mg/dL    eGFR Non  Amer 57 51 - 120 mL/min/1.73    Globulin 3.4 2.3 - 3.5 gm/dL    A/G Ratio 1.0 (L) 1.1 - 1.8 g/dL    BUN/Creatinine Ratio 27.0 (H) 7.0 - 25.0    Anion Gap 10.0 5.0 - 15.0 mmol/L   BNP   Result Value Ref Range    proBNP 168.0 0.0 - 900.0 pg/mL   Protime-INR   Result Value Ref Range    Protime 12.2 11.1 - 15.3 Seconds    INR 0.92 0.80 -  1.20   CBC Auto Differential   Result Value Ref Range    WBC 8.47 3.20 - 9.80 10*3/mm3    RBC 4.70 3.77 - 5.16 10*6/mm3    Hemoglobin 12.9 12.0 - 15.5 g/dL    Hematocrit 39.6 35.0 - 45.0 %    MCV 84.3 80.0 - 98.0 fL    MCH 27.4 26.5 - 34.0 pg    MCHC 32.6 31.4 - 36.0 g/dL    RDW 16.7 (H) 11.5 - 14.5 %    RDW-SD 51.3 (H) 36.4 - 46.3 fl    MPV 9.0 8.0 - 12.0 fL    Platelets 239 150 - 450 10*3/mm3    Neutrophil % 45.9 37.0 - 80.0 %    Lymphocyte % 41.8 10.0 - 50.0 %    Monocyte % 7.2 0.0 - 12.0 %    Eosinophil % 3.9 0.0 - 7.0 %    Basophil % 0.6 0.0 - 2.0 %    Immature Grans % 0.6 (H) 0.0 - 0.5 %    Neutrophils, Absolute 3.89 2.00 - 8.60 10*3/mm3    Lymphocytes, Absolute 3.54 0.60 - 4.20 10*3/mm3    Monocytes, Absolute 0.61 0.00 - 0.90 10*3/mm3    Eosinophils, Absolute 0.33 0.00 - 0.70 10*3/mm3    Basophils, Absolute 0.05 0.00 - 0.20 10*3/mm3    Immature Grans, Absolute 0.05 (H) 0.00 - 0.02 10*3/mm3   Light Blue Top   Result Value Ref Range    Extra Tube hold for add-on    Green Top (Gel)   Result Value Ref Range    Extra Tube Hold for add-ons.    Lavender Top   Result Value Ref Range    Extra Tube hold for add-on    Gold Top - SST   Result Value Ref Range    Extra Tube Hold for add-ons.      Xr Chest 2 View    Result Date: 9/13/2018  Narrative: EXAM:          Radiograph(s), Chest VIEWS:    PA / Lat ; 2     DATE/TIME:  9/13/2018 5:11 PM CDT            INDICATION:   Chest pain protocol  COMPARISON:  CXR: 9/10/18         FINDINGS:         - lines/tubes:    none   - cardiac:         Mild cardiac silhouette enlargement       - mediastinum: contour within normal limits       - lungs:         no focal air space process, pulmonary interstitial edema, nodule(s)/mass           - pleura:         no evidence of  fluid                - osseous:         Status post median sternotomy                - misc.:        Impression: CONCLUSION:    1. No evidence of an active cardiopulmonary process.                                   Electronically signed by:  MAY Mcdonald MD  9/13/2018 5:12 PM CDT Workstation: [id number]    Xr Chest 2 View    Result Date: 9/10/2018  Narrative: [facility] Patient Name: DALE FINK ORDERING: RAY BINGHAM ATTENDING: RAY BINGHAM REFERRING: RAY BINGHAM ----------------------- PROCEDURE: Two-view chest COMPARISON: 8/26/2017 HISTORY: sob FINDINGS: Frontal and lateral views of the chest are obtained. Devices: None Lungs/Pleura: Mild pulmonary vascular congestion. The lungs otherwise appear clear. Cardiomediastinal structures: Sternal suture wires appear stable.     Impression: CONCLUSION:  Mild pulmonary vascular congestion. Electronically signed by:  Umesh Parra MD  9/10/2018 5:24 PM CDT Workstation: [id number]                MDM      Final diagnoses:   Chest wall pain            Bebeto Coyle PA-C  09/13/18 [id number]

## 2018-09-18 ENCOUNTER — READMISSION MANAGEMENT (OUTPATIENT)
Dept: CALL CENTER | Facility: HOSPITAL | Age: 58
End: 2018-09-18

## 2018-09-18 NOTE — OUTREACH NOTE
COPD/PN Week 1 Survey      Responses   Facility patient discharged from?  [facility]   Does the patient have one of the following disease processes/diagnoses(primary or secondary)?  COPD/Pneumonia   Is there a successful TCM telephone encounter documented?  No   Was the primary reason for admission:  COPD exacerbation   Week 1 attempt successful?  Yes   Call start time  0935   Call end time  0953   Discharge diagnosis  Intercostal pain, IDDM with diabetic polyneuropathy, COPD, tobacco dependence syndrome, morbid obesity BMI 50.-59.9m , CAD without angina pectoris   Meds reviewed with patient/caregiver?  Yes   Is the patient having any side effects they believe may be caused by any medication additions or changes?  No   Does the patient have all medications ordered at discharge?  No   What is keeping the patient from filling the prescriptions?  Pre-authorization in progress, Patient desires to consult PCP   Nursing Interventions  Nurse advised patient to call provider, Nurse provided patient education   Is the patient taking all medications as directed (includes completed medication regime)?  No   What is preventing the patient from taking all medications as directed?  Desires to consult PCP first   Nursing Interventions  Nurse provided patient education   Medication comments  Will discuss Jardiance and Lidoderm with Dr Bernabe   Does the patient have a primary care provider?   Yes   Does the patient have an appointment with their PCP or pulmonologist within 7 days of discharge?  Greater than 7 days   What is preventing the patient from scheduling follow up appointments within 7 days of discharge?  Earlier appointment not available   Nursing Interventions  Verified appointment date/time/provider, Educated patient on importance of making appointment, Urgent appointment needed   Urgent appointment interventions  Facilitated patient appointment   Has the patient kept scheduled appointments due by today?  N/A   Has home  health visited the patient within 72 hours of discharge?  N/A   Psychosocial issues?  No   Did the patient receive a copy of their discharge instructions?  Yes   Nursing interventions  Reviewed instructions with patient   What is the patient's perception of their health status since discharge?  Same   Nursing Interventions  Nurse provided patient education   Are the patient's immunizations up to date?   Yes   Nursing interventions  Educated on importance of maintaining up to date immunizations as advised by provider, Advised patient to discuss with provider at next visit   If the patient is a current smoker, are they able to teach back resources for cessation?  [id number]   Is the patient/caregiver able to teach back the hierarchy of who to call/visit for symptoms/problems? PCP, Specialist, Home health nurse, Urgent Care, ED, 911  Yes   Is the patient able to teach back COPD zones?  Yes   Nursing interventions  Education provided on various zones   Patient reports what zone on this call?  Yellow Zone   Yellow Zone  Increased or thicker phlegm/mucus, Increased shortness of air, Unable to complete daily activities, Using quick relief inhaler/nebulizer more often, Increased swelling of ankles, Poor sleep and symptoms work patient up   Yellow interventions  Continue to use daily medications, Use quick relief inhaler as ordered, Do not smoke, Get plenty of rest, Call provider immediatly if symptoms do not improve   Week 1 call completed?  Yes          Jaja Sawyer RN

## 2018-09-19 ENCOUNTER — OFFICE VISIT (OUTPATIENT)
Dept: FAMILY MEDICINE CLINIC | Facility: CLINIC | Age: 58
End: 2018-09-19

## 2018-09-19 VITALS
HEART RATE: 99 BPM | OXYGEN SATURATION: 94 % | SYSTOLIC BLOOD PRESSURE: 142 MMHG | HEIGHT: 62 IN | DIASTOLIC BLOOD PRESSURE: 84 MMHG

## 2018-09-19 DIAGNOSIS — B02.9 HERPES ZOSTER WITHOUT COMPLICATION: Primary | ICD-10-CM

## 2018-09-19 PROCEDURE — 99213 OFFICE O/P EST LOW 20 MIN: CPT | Performed by: STUDENT IN AN ORGANIZED HEALTH CARE EDUCATION/TRAINING PROGRAM

## 2018-09-19 RX ORDER — VALACYCLOVIR HYDROCHLORIDE 1 G/1
1000 TABLET, FILM COATED ORAL 3 TIMES DAILY
Qty: 21 TABLET | Refills: 0 | Status: SHIPPED | OUTPATIENT
Start: 2018-09-19 | End: 2019-03-28

## 2018-09-19 RX ORDER — LIDOCAINE 50 MG/G
OINTMENT TOPICAL
Qty: 1 TUBE | Refills: 0 | Status: SHIPPED | OUTPATIENT
Start: 2018-09-19 | End: 2019-02-06 | Stop reason: SDUPTHER

## 2018-09-20 RX ORDER — FUROSEMIDE 40 MG/1
40 TABLET ORAL DAILY
Qty: 30 TABLET | Refills: 3 | Status: SHIPPED | OUTPATIENT
Start: 2018-09-20 | End: 2020-08-05 | Stop reason: SDUPTHER

## 2018-09-26 ENCOUNTER — READMISSION MANAGEMENT (OUTPATIENT)
Dept: CALL CENTER | Facility: HOSPITAL | Age: 58
End: 2018-09-26

## 2018-09-26 NOTE — PROGRESS NOTES
Subjective:     Yamileth Slater is a 58 y.o. female who presents for f/u hospital visit for left sided chest pain.   Chest pain described as lung pain. Left sided rib cage pain. Now presenting with rash on left back.  The following portions of the patient's history were reviewed and updated as appropriate: allergies, current medications, past family history, past medical history, past social history, past surgical history and problem list.      Past Medical Hx:  Past Medical History:   Diagnosis Date   • Anxiety states    • Asthma    • Carotid artery stenosis     DWIGHT 0-15%, LICA 0-15%. LICA stent 5/2014.   • Chronic folliculitis    • Chronic obstructive lung disease (CMS/HCC)    • Coronary arteriosclerosis    • Diabetes mellitus (CMS/[facility])     no retinopathy; A1C 9.1      • Dyslipidemia    • Essential hypertension    • Excoriated eczema     asteosis/keratosis   • GERD (gastroesophageal reflux disease)    • History of colon polyps    • Hypertensive disorder    • Kidney stone    • Mixed hyperlipidemia    • Morbid obesity due to excess calories (CMS/[facility])     BMI 44.5   • Neurologic disorder associated with diabetes mellitus (CMS/HCC)    • Non-healing surgical wound     LLE EVHa   • Peripheral vascular disease (CMS/[facility])    • Sleep apnea    • Tobacco dependence syndrome     1/2ppd      • Type 2 diabetes mellitus (CMS/[facility])    • Vitamin D deficiency        Past Surgical Hx:  Past Surgical History:   Procedure Laterality Date   • CARDIAC CATHETERIZATION  08/09/2013    Severe multivessel CAD with critical lesions noted in the RCA, obtuse marginal two, ramus intermemdious, and LAD as described above. Normal LV systolic function with no wall motion abnormality.    • CARDIAC CATHETERIZATION  05/27/2014     LEFT Carotid Stent    • CAROTID STENT Left 05/27/2014   • COLONOSCOPY  05/02/2016    Normal colon.Diverticulosis in the sigmoid colon.No specimens collected.    • CORONARY ARTERY BYPASS GRAFT  11/15/2013    CABG x 3  with LIMA to LAD and coronary endarterectomy to LAD, SVG to Ramus intermedius branch and SVG to PDA.    • CYSTOSCOPY URETEROSCOPY LASER LITHOTRIPSY     • ENDOSCOPY  09/23/2015     Esophageal stricture present.Normal stomach.Normal duodenum.    • ENDOSCOPY  11/16/2015    w/ dilatation - Esophageal stricture present,Dilatation performed.Normal stomach and duodenum.    • HYSTERECTOMY     • INCISION AND DRAINAGE ABSCESS  01/02/2016    Incision and drainage of right perineal abscess.    • INCISION AND DRAINAGE ABSCESS  02/18/2014    Incision and Drainage of abscess of left lower leg    • OTHER SURGICAL HISTORY  01/25/2016    DESTRUCTION OF BENIGN LESION      • OTHER SURGICAL HISTORY  04/18/2014    ear/neck - L attempted CEA, Neck Dissection    • TUBAL ABDOMINAL LIGATION         Health Maintenance:  Health Maintenance   Topic Date Due   • MEDICARE ANNUAL WELLNESS  08/24/2016   • LUNG CANCER SCREENING  03/14/2017   • INFLUENZA VACCINE  12/14/2018 (Originally 8/1/2018)   • DIABETIC EYE EXAM  12/14/2018 (Originally 8/24/2016)   • ZOSTER VACCINE (1 of 2) 12/14/2018 (Originally 3/10/2009)   • URINE MICROALBUMIN  10/05/2018   • DIABETIC FOOT EXAM  11/29/2018   • MAMMOGRAM  01/04/2019   • HEMOGLOBIN A1C  03/10/2019   • LIPID PANEL  04/04/2019   • PAP SMEAR  01/04/2020   • COLONOSCOPY  05/02/2021   • TDAP/TD VACCINES (2 - Td) 11/10/2024   • PNEUMOCOCCAL VACCINE (19-64 MEDIUM RISK)  Completed   • HEPATITIS C SCREENING  Completed       Current Meds:    Current Outpatient Prescriptions:   •  albuterol (ACCUNEB) 0.63 MG/3ML nebulizer solution, Take 3 mL by nebulization Every 6 (Six) Hours As Needed for Wheezing., Disp: 20 vial, Rfl: 0  •  albuterol (PROVENTIL HFA;VENTOLIN HFA) 108 (90 Base) MCG/ACT inhaler, Inhale 2 puffs Every 4 (Four) Hours As Needed for Wheezing., Disp: 1 inhaler, Rfl: 0  •  atorvastatin (LIPITOR) 20 MG tablet, Take 1 tablet by mouth Daily., Disp: 30 tablet, Rfl: 3  •  betamethasone valerate (VALISONE) 0.1 %  cream, Apply  topically to the appropriate area as directed Daily., Disp: 45 g, Rfl: 2  •  Blood Glucose Monitoring Suppl ([employer] BLOOD GLUCOSE METER) W/DEVICE kit, 1 each 3 (Three) Times a Day., Disp: 1 kit, Rfl: 3  •  budesonide-formoterol (SYMBICORT) 160-4.5 MCG/ACT inhaler, Inhale 2 puffs 2 (Two) Times a Day., Disp: 10.2 g, Rfl: 6  •  cetirizine (zyrTEC) 10 MG tablet, Take 1 tablet by mouth Daily., Disp: 30 tablet, Rfl: 3  •  clopidogrel (PLAVIX) 75 MG tablet, Take 1 tablet by mouth Daily., Disp: 30 tablet, Rfl: 3  •  cyclobenzaprine (FLEXERIL) 10 MG tablet, TAKE 1 TABLET BY MOUTH 2 (TWO) TIMES A DAY AS NEEDED FOR MUSCLE SPASMS., Disp: 60 tablet, Rfl: 2  •  Empagliflozin (JARDIANCE) 10 MG tablet, Take 10 mg by mouth Every Morning., Disp: 90 tablet, Rfl: 3  •  ferrous sulfate (FERROUSUL) 325 (65 FE) MG tablet, Take 1 tablet by mouth Daily With Breakfast., Disp: 30 tablet, Rfl: 3  •  gabapentin (NEURONTIN) 800 MG tablet, Take 1 tablet by mouth 3 (Three) Times a Day. For neuropathy, Disp: 90 tablet, Rfl: 3  •  glucose blood test strip, Use as instructed, Disp: 100 each, Rfl: 12  •  Insulin Lispro (HUMALOG KWIKPEN) 100 UNIT/ML solution pen-injector, Inject 45 Units under the skin 3 (Three) Times a Day Before Meals., Disp: 60 mL, Rfl: 11  •  Insulin Pen Needle (NOVOFINE) 30G X 8 MM misc, As directed 4 times daily, Disp: 100 each, Rfl: 11  •  ipratropium-albuterol (DUO-NEB) 0.5-2.5 mg/3 ml nebulizer, Take 3 mL by nebulization 4 (Four) Times a Day. ICD10 code J96.01, Disp: 360 mL, Rfl: 0  •  LANTUS SOLOSTAR 100 UNIT/ML injection pen, Inject 95 Units under the skin into the appropriate area as directed Every 12 (Twelve) Hours., Disp: 3 mL, Rfl: 11  •  lidocaine (LIDODERM) 5 %, Place 1 patch on the skin as directed by provider Daily. Remove & Discard patch within 12 hours or as directed by MD, Disp: 15 each, Rfl: 0  •  Liraglutide (VICTOZA) 18 MG/3ML solution pen-injector injection, Inject 1.2 mg under the skin into the  appropriate area as directed Every Morning., Disp: 9 mL, Rfl: 3  •  lisinopril (PRINIVIL,ZESTRIL) 10 MG tablet, Take 1 tablet by mouth Daily., Disp: 30 tablet, Rfl: 3  •  meloxicam (MOBIC) 7.5 MG tablet, Take 1 tablet by mouth Daily. Start medication on 8/24/18, Disp: 30 tablet, Rfl: 1  •  metFORMIN ER (GLUCOPHAGE-XR) 500 MG 24 hr tablet, TAKE 2 TABLET BY MOUTH 2 (TWO) TIMES A DAY WITH MEALS., Disp: 120 tablet, Rfl: 3  •  metoprolol tartrate (LOPRESSOR) 50 MG tablet, Take 1 tablet by mouth Every 12 (Twelve) Hours., Disp: 60 tablet, Rfl: 3  •  montelukast (SINGULAIR) 10 MG tablet, Take 1 tablet by mouth Every Night., Disp: 30 tablet, Rfl: 3  •  nitroglycerin (NITROSTAT) 0.4 MG SL tablet, Place 1 tablet under the tongue As Needed for Chest Pain. Take no more than 3 doses in 15 minutes., Disp: 30 tablet, Rfl: 3  •  nystatin (MYCOSTATIN) 635953 UNIT/GM powder, Apply  topically to the appropriate area as directed 3 (Three) Times a Day., Disp: 15 g, Rfl: 1  •  pantoprazole (PROTONIX) 40 MG EC tablet, Take 1 tablet by mouth Daily., Disp: 30 tablet, Rfl: 3  •  potassium chloride (K-DUR) 10 MEQ CR tablet, TAKE 1 TABLET BY MOUTH EVERY NIGHT., Disp: 30 tablet, Rfl: 3  •  promethazine (PHENERGAN) 25 MG tablet, Take 1 tablet by mouth Every 6 (Six) Hours As Needed for Nausea or Vomiting., Disp: 30 tablet, Rfl: 0  •  furosemide (LASIX) 40 MG tablet, TAKE 1 TABLET BY MOUTH DAILY., Disp: 30 tablet, Rfl: 3  •  ibuprofen (ADVIL,MOTRIN) 800 MG tablet, Take 1 tablet by mouth Every 6 (Six) Hours As Needed for Mild Pain ., Disp: 20 tablet, Rfl: 0  •  lidocaine (XYLOCAINE) 5 % ointment, Apply  topically to the appropriate area as directed Every 2 (Two) Hours As Needed for Mild Pain ., Disp: 1 tube, Rfl: 0  •  methocarbamol (ROBAXIN) 750 MG tablet, Take 1 tablet by mouth 4 (Four) Times a Day As Needed for Muscle Spasms., Disp: 30 tablet, Rfl: 1  •  valACYclovir (VALTREX) 1000 MG tablet, Take 1 tablet by mouth 3 (Three) Times a Day., Disp:  "21 tablet, Rfl: 0    Allergies:  Other    Family Hx:  Family History   Problem Relation Age of Onset   • Heart disease Mother    • Cancer Mother    • Heart disease Father    • Heart disease Sister    • Cancer Sister    • Heart disease Brother         Social History:  Social History     Social History   • Marital status:      Spouse name: wesley dumont   • Number of children: N/A   • Years of education: N/A     Occupational History   • Not on file.     Social History Main Topics   • Smoking status: Current Every Day Smoker     Packs/day: 1.00     Years: 40.00     Types: Cigarettes   • Smokeless tobacco: Never Used   • Alcohol use No   • Drug use: Yes     Types: LSD, Marijuana, Methamphetamines      Comment: Lasted used in 2006   • Sexual activity: Not Currently     Partners: Male     Other Topics Concern   • Not on file     Social History Narrative   • No narrative on file       Review of Systems  Review of Systems   Constitutional: Negative for chills and fever.   Respiratory: Negative for shortness of breath.    Cardiovascular: Positive for chest pain.   Musculoskeletal: Positive for back pain.   Skin: Positive for rash.       Objective:     /84 (BP Location: Left arm, Patient Position: Sitting, Cuff Size: Large Adult)   Pulse 99   Ht 157.5 cm (62\")   LMP  (LMP Unknown)   SpO2 94%   Physical Exam   Constitutional: She is oriented to person, place, and time. No distress.   Morbidly obese     HENT:   Head: Normocephalic and atraumatic.   Eyes: Conjunctivae are normal. Right eye exhibits no discharge. Left eye exhibits no discharge.   Cardiovascular: Normal rate and regular rhythm.    Pulmonary/Chest: Effort normal. No respiratory distress.   Neurological: She is alert and oriented to person, place, and time.   Skin: Rash noted. Rash is vesicular. She is not diaphoretic.        Psychiatric: She has a normal mood and affect. Her behavior is normal. Judgment and thought content normal.   Nursing note and " vitals reviewed.      Lab Review  Results for orders placed or performed during the hospital encounter of 09/13/18   Troponin   Result Value Ref Range    Troponin I <0.012 <=0.034 ng/mL   Troponin   Result Value Ref Range    Troponin I <0.012 <=0.034 ng/mL   Comprehensive Metabolic Panel   Result Value Ref Range    Glucose 159 (H) 60 - 100 mg/dL    BUN 27 (H) 7 - 21 mg/dL    Creatinine 1.00 0.50 - 1.00 mg/dL    Sodium 138 137 - 145 mmol/L    Potassium 4.3 3.5 - 5.1 mmol/L    Chloride 103 95 - 110 mmol/L    CO2 25.0 22.0 - 31.0 mmol/L    Calcium 8.8 8.4 - 10.2 mg/dL    Total Protein 6.7 6.3 - 8.6 g/dL    Albumin 3.30 (L) 3.40 - 4.80 g/dL    ALT (SGPT) 36 9 - 52 U/L    AST (SGOT) 37 (H) 14 - 36 U/L    Alkaline Phosphatase 131 (H) 38 - 126 U/L    Total Bilirubin 0.2 0.2 - 1.3 mg/dL    eGFR Non  Amer 57 51 - 120 mL/min/1.73    Globulin 3.4 2.3 - 3.5 gm/dL    A/G Ratio 1.0 (L) 1.1 - 1.8 g/dL    BUN/Creatinine Ratio 27.0 (H) 7.0 - 25.0    Anion Gap 10.0 5.0 - 15.0 mmol/L   BNP   Result Value Ref Range    proBNP 168.0 0.0 - 900.0 pg/mL   Protime-INR   Result Value Ref Range    Protime 12.2 11.1 - 15.3 Seconds    INR 0.92 0.80 - 1.20   CBC Auto Differential   Result Value Ref Range    WBC 8.47 3.20 - 9.80 10*3/mm3    RBC 4.70 3.77 - 5.16 10*6/mm3    Hemoglobin 12.9 12.0 - 15.5 g/dL    Hematocrit 39.6 35.0 - 45.0 %    MCV 84.3 80.0 - 98.0 fL    MCH 27.4 26.5 - 34.0 pg    MCHC 32.6 31.4 - 36.0 g/dL    RDW 16.7 (H) 11.5 - 14.5 %    RDW-SD 51.3 (H) 36.4 - 46.3 fl    MPV 9.0 8.0 - 12.0 fL    Platelets 239 150 - 450 10*3/mm3    Neutrophil % 45.9 37.0 - 80.0 %    Lymphocyte % 41.8 10.0 - 50.0 %    Monocyte % 7.2 0.0 - 12.0 %    Eosinophil % 3.9 0.0 - 7.0 %    Basophil % 0.6 0.0 - 2.0 %    Immature Grans % 0.6 (H) 0.0 - 0.5 %    Neutrophils, Absolute 3.89 2.00 - 8.60 10*3/mm3    Lymphocytes, Absolute 3.54 0.60 - 4.20 10*3/mm3    Monocytes, Absolute 0.61 0.00 - 0.90 10*3/mm3    Eosinophils, Absolute 0.33 0.00 - 0.70 10*3/mm3     Basophils, Absolute 0.05 0.00 - 0.20 10*3/mm3    Immature Grans, Absolute 0.05 (H) 0.00 - 0.02 10*3/mm3   Light Blue Top   Result Value Ref Range    Extra Tube hold for add-on    Green Top (Gel)   Result Value Ref Range    Extra Tube Hold for add-ons.    Lavender Top   Result Value Ref Range    Extra Tube hold for add-on    Gold Top - SST   Result Value Ref Range    Extra Tube Hold for add-ons.        none     Assessment:     Yamileth was seen today for chest pain.    Diagnoses and all orders for this visit:    Herpes zoster without complication    Other orders  -     lidocaine (XYLOCAINE) 5 % ointment; Apply  topically to the appropriate area as directed Every 2 (Two) Hours As Needed for Mild Pain .  -     valACYclovir (VALTREX) 1000 MG tablet; Take 1 tablet by mouth 3 (Three) Times a Day.        Plan:     I have seen and examined the patient.  I have reviewed the notes, assessments, and/or procedures performed by the Resident, I concur with her/his documentation and assessment and plan for Yamileth Slater.    This document has been electronically signed by Boogie Thomson MD on September 26, 2018 4:18 PM

## 2018-09-26 NOTE — OUTREACH NOTE
COPD/PN Week 2 Survey      Responses   Facility patient discharged from?  [place]   Does the patient have one of the following disease processes/diagnoses(primary or secondary)?  COPD/Pneumonia   Was the primary reason for admission:  COPD exacerbation   Week 2 attempt successful?  Yes   Call start time  1043   Call end time  1049   Discharge diagnosis  Intercostal pain, IDDM with diabetic polyneuropathy, COPD, tobacco dependence syndrome, morbid obesity BMI 50.-59.9m , CAD without angina pectoris   Is patient permission given to speak with other caregiver?  No   Meds reviewed with patient/caregiver?  Yes   Is the patient having any side effects they believe may be caused by any medication additions or changes?  No   Does the patient have all medications ordered at discharge?  Yes   Is the patient taking all medications as directed (includes completed medication regime)?  Yes   Medication comments  On antibiotic and lidocaine cream    Does the patient have a primary care provider?   Yes   Has the patient kept scheduled appointments due by today?  Yes   Psychosocial issues?  No   Nursing interventions  Reviewed instructions with patient, Educated on MyChart   What is the patient's perception of their health status since discharge?  New symptoms unrelated to diagnosis [Has shingles]   Nursing Interventions  Nurse provided patient education   If the patient is a current smoker, are they able to teach back resources for cessation?  Smoking cessation support groups, Smoking cessation classes [Not smoking as much right now. ]   Is the patient/caregiver able to teach back the hierarchy of who to call/visit for symptoms/problems? PCP, Specialist, Home health nurse, Urgent Care, ED, 911  Yes   Is the patient able to teach back COPD zones?  Yes   Nursing interventions  Education provided on various zones   Patient reports what zone on this call?  Green Zone   Green Zone  Reports doing well, Breathing without shortness of  breath, Usual activity and exercise level, Usual amount of phlegm/mucus without difficulty coughing up, Sleeping well, Appetite is good   Green Zone interventions:  Take daily medications, Continue regular exercise/diet plan, Avoid indoor/outdoor triggers   Week 2 call completed?  Yes          Neelam Macias RN

## 2018-10-04 ENCOUNTER — READMISSION MANAGEMENT (OUTPATIENT)
Dept: CALL CENTER | Facility: HOSPITAL | Age: 58
End: 2018-10-04

## 2018-10-04 NOTE — OUTREACH NOTE
COPD/PN Week 3 Survey      Responses   Facility patient discharged from?  [place]   Does the patient have one of the following disease processes/diagnoses(primary or secondary)?  COPD/Pneumonia   Was the primary reason for admission:  COPD exacerbation   Week 3 attempt successful?  No   Unsuccessful attempts  Attempt 1          Juliana Fields RN

## 2018-10-08 ENCOUNTER — READMISSION MANAGEMENT (OUTPATIENT)
Dept: CALL CENTER | Facility: HOSPITAL | Age: 58
End: 2018-10-08

## 2018-10-08 NOTE — OUTREACH NOTE
COPD/PN Week 3 Survey      Responses   Facility patient discharged from?  [place]   Does the patient have one of the following disease processes/diagnoses(primary or secondary)?  COPD/Pneumonia   Was the primary reason for admission:  COPD exacerbation   Week 3 attempt successful?  Yes   Call start time  1440   Call end time  1444   Discharge diagnosis  Intercostal pain, IDDM with diabetic polyneuropathy, COPD, tobacco dependence syndrome, morbid obesity BMI 50.-59.9m , CAD without angina pectoris   Meds reviewed with patient/caregiver?  Yes   Is the patient having any side effects they believe may be caused by any medication additions or changes?  No   Does the patient have all medications ordered at discharge?  Yes   Is the patient taking all medications as directed (includes completed medication regime)?  Yes   Does the patient have a primary care provider?   Yes   Has the patient kept scheduled appointments due by today?  No   Nursing Interventions  Advised patient to keep appointment   Has home health visited the patient within 72 hours of discharge?  N/A   Psychosocial issues?  No   Did the patient receive a copy of their discharge instructions?  Yes   Nursing interventions  Reviewed instructions with patient   What is the patient's perception of their health status since discharge?  Improving   Nursing Interventions  Nurse provided patient education   Are the patient's immunizations up to date?   Yes   Nursing interventions  Educated on importance of maintaining up to date immunizations as advised by provider   If the patient is a current smoker, are they able to teach back resources for cessation?  [id number]   Is the patient/caregiver able to teach back the hierarchy of who to call/visit for symptoms/problems? PCP, Specialist, Home health nurse, Urgent Care, ED, 911  Yes   Additional teach back comments  She is still smoking 1/2 PPD.   Is the patient able to teach back COPD zones?  Yes   Nursing  interventions  Education provided on various zones   Patient reports what zone on this call?  Yellow Zone   Yellow Zone  Fever or feeling like have a chest cold, Increased shortness of air, Unable to complete daily activities, Poor sleep and symptoms work patient up   Yellow interventions  Continue to use daily medications, Get plenty of rest, Call provider immediatly if symptoms do not improve, Do not smoke, Use other meds such as steroids or antibiotics as ordered   Week 3 call completed?  Yes          Jaja Sawyer RN

## 2018-10-16 ENCOUNTER — READMISSION MANAGEMENT (OUTPATIENT)
Dept: CALL CENTER | Facility: HOSPITAL | Age: 58
End: 2018-10-16

## 2018-10-16 NOTE — OUTREACH NOTE
Medical Week 4 Survey      Responses   Facility patient discharged from?  [facility]   Does the patient have one of the following disease processes/diagnoses(primary or secondary)?  COPD/Pneumonia          Marybel Hernandez RN

## 2018-10-18 RX ORDER — POTASSIUM CHLORIDE 750 MG/1
10 TABLET, FILM COATED, EXTENDED RELEASE ORAL NIGHTLY
Qty: 30 TABLET | Refills: 3 | OUTPATIENT
Start: 2018-10-18

## 2018-10-22 ENCOUNTER — EPISODE CHANGES (OUTPATIENT)
Dept: CASE MANAGEMENT | Facility: OTHER | Age: 58
End: 2018-10-22

## 2018-10-28 ENCOUNTER — HOSPITAL ENCOUNTER (EMERGENCY)
Facility: HOSPITAL | Age: 58
Discharge: HOME | End: 2018-10-28
Attending: EMERGENCY MEDICINE | Admitting: EMERGENCY MEDICINE

## 2018-10-28 ENCOUNTER — APPOINTMENT (OUTPATIENT)
Dept: GENERAL RADIOLOGY | Facility: HOSPITAL | Age: 58
End: 2018-10-28

## 2018-10-28 VITALS
TEMPERATURE: 97.4 F | RESPIRATION RATE: 18 BRPM | SYSTOLIC BLOOD PRESSURE: 184 MMHG | WEIGHT: 293 LBS | DIASTOLIC BLOOD PRESSURE: 98 MMHG | HEIGHT: 62 IN | OXYGEN SATURATION: 93 % | HEART RATE: 94 BPM | BODY MASS INDEX: 53.92 KG/M2

## 2018-10-28 DIAGNOSIS — R07.82 INTERCOSTAL PAIN: ICD-10-CM

## 2018-10-28 DIAGNOSIS — R50.9 FEVER, UNSPECIFIED FEVER CAUSE: ICD-10-CM

## 2018-10-28 DIAGNOSIS — R06.02 SOB (SHORTNESS OF BREATH): ICD-10-CM

## 2018-10-28 DIAGNOSIS — R05.9 COUGH: Primary | ICD-10-CM

## 2018-10-28 LAB
ANION GAP SERPL CALCULATED.3IONS-SCNC: 9 MMOL/L (ref 5–15)
BASOPHILS # BLD AUTO: 0.05 10*3/MM3 (ref 0–0.2)
BASOPHILS NFR BLD AUTO: 0.6 % (ref 0–2)
BUN BLD-MCNC: 24 MG/DL (ref 7–21)
BUN/CREAT SERPL: 23.5 (ref 7–25)
CALCIUM SPEC-SCNC: 8.5 MG/DL (ref 8.4–10.2)
CHLORIDE SERPL-SCNC: 104 MMOL/L (ref 95–110)
CO2 SERPL-SCNC: 26 MMOL/L (ref 22–31)
CREAT BLD-MCNC: 1.02 MG/DL (ref 0.5–1)
DEPRECATED RDW RBC AUTO: 51 FL (ref 36.4–46.3)
EOSINOPHIL # BLD AUTO: 0.31 10*3/MM3 (ref 0–0.7)
EOSINOPHIL NFR BLD AUTO: 3.7 % (ref 0–7)
ERYTHROCYTE [DISTWIDTH] IN BLOOD BY AUTOMATED COUNT: 16 % (ref 11.5–14.5)
GFR SERPL CREATININE-BSD FRML MDRD: 55 ML/MIN/1.73 (ref 51–120)
GLUCOSE BLD-MCNC: 159 MG/DL (ref 60–100)
HCT VFR BLD AUTO: 39.8 % (ref 35–45)
HGB BLD-MCNC: 13.4 G/DL (ref 12–15.5)
HOLD SPECIMEN: NORMAL
IMM GRANULOCYTES # BLD: 0.03 10*3/MM3 (ref 0–0.02)
IMM GRANULOCYTES NFR BLD: 0.4 % (ref 0–0.5)
LYMPHOCYTES # BLD AUTO: 2.78 10*3/MM3 (ref 0.6–4.2)
LYMPHOCYTES NFR BLD AUTO: 33.5 % (ref 10–50)
MCH RBC QN AUTO: 29 PG (ref 26.5–34)
MCHC RBC AUTO-ENTMCNC: 33.7 G/DL (ref 31.4–36)
MCV RBC AUTO: 86.1 FL (ref 80–98)
MONOCYTES # BLD AUTO: 0.46 10*3/MM3 (ref 0–0.9)
MONOCYTES NFR BLD AUTO: 5.5 % (ref 0–12)
NEUTROPHILS # BLD AUTO: 4.68 10*3/MM3 (ref 2–8.6)
NEUTROPHILS NFR BLD AUTO: 56.3 % (ref 37–80)
PLATELET # BLD AUTO: 226 10*3/MM3 (ref 150–450)
PMV BLD AUTO: 9.4 FL (ref 8–12)
POTASSIUM BLD-SCNC: 3.6 MMOL/L (ref 3.5–5.1)
RBC # BLD AUTO: 4.62 10*6/MM3 (ref 3.77–5.16)
SODIUM BLD-SCNC: 139 MMOL/L (ref 137–145)
TROPONIN I SERPL-MCNC: <0.012 NG/ML
WBC NRBC COR # BLD: 8.31 10*3/MM3 (ref 3.2–9.8)
WHOLE BLOOD HOLD SPECIMEN: NORMAL
WHOLE BLOOD HOLD SPECIMEN: NORMAL

## 2018-10-28 PROCEDURE — 93005 ELECTROCARDIOGRAM TRACING: CPT | Performed by: EMERGENCY MEDICINE

## 2018-10-28 PROCEDURE — 36415 COLL VENOUS BLD VENIPUNCTURE: CPT | Performed by: STUDENT IN AN ORGANIZED HEALTH CARE EDUCATION/TRAINING PROGRAM

## 2018-10-28 PROCEDURE — 93010 ELECTROCARDIOGRAM REPORT: CPT | Performed by: INTERNAL MEDICINE

## 2018-10-28 PROCEDURE — 80048 BASIC METABOLIC PNL TOTAL CA: CPT | Performed by: STUDENT IN AN ORGANIZED HEALTH CARE EDUCATION/TRAINING PROGRAM

## 2018-10-28 PROCEDURE — 84484 ASSAY OF TROPONIN QUANT: CPT | Performed by: STUDENT IN AN ORGANIZED HEALTH CARE EDUCATION/TRAINING PROGRAM

## 2018-10-28 PROCEDURE — 71046 X-RAY EXAM CHEST 2 VIEWS: CPT

## 2018-10-28 PROCEDURE — 99283 EMERGENCY DEPT VISIT LOW MDM: CPT

## 2018-10-28 PROCEDURE — 85025 COMPLETE CBC W/AUTO DIFF WBC: CPT | Performed by: STUDENT IN AN ORGANIZED HEALTH CARE EDUCATION/TRAINING PROGRAM

## 2018-11-07 RX ORDER — CYCLOBENZAPRINE HCL 10 MG
10 TABLET ORAL 2 TIMES DAILY PRN
Qty: 60 TABLET | Refills: 2 | Status: SHIPPED | OUTPATIENT
Start: 2018-11-07 | End: 2018-12-26 | Stop reason: SDUPTHER

## 2018-11-12 DIAGNOSIS — E11.69 DIABETES MELLITUS TYPE 2 IN OBESE (HCC): ICD-10-CM

## 2018-11-12 DIAGNOSIS — E66.9 DIABETES MELLITUS TYPE 2 IN OBESE (HCC): ICD-10-CM

## 2018-11-12 RX ORDER — CLOPIDOGREL BISULFATE 75 MG/1
75 TABLET ORAL DAILY
Qty: 30 TABLET | Refills: 3 | Status: SHIPPED | OUTPATIENT
Start: 2018-11-12 | End: 2019-03-04 | Stop reason: SDUPTHER

## 2018-11-12 RX ORDER — CLOPIDOGREL BISULFATE 75 MG/1
TABLET ORAL
Qty: 30 TABLET | Refills: 4 | Status: CANCELLED | OUTPATIENT
Start: 2018-11-12

## 2018-11-12 RX ORDER — LISINOPRIL 10 MG/1
10 TABLET ORAL DAILY
Qty: 30 TABLET | Refills: 3 | Status: SHIPPED | OUTPATIENT
Start: 2018-11-12 | End: 2018-12-26 | Stop reason: SDUPTHER

## 2018-11-12 RX ORDER — LISINOPRIL 10 MG/1
TABLET ORAL
Qty: 30 TABLET | Refills: 4 | Status: CANCELLED | OUTPATIENT
Start: 2018-11-12

## 2018-11-19 ENCOUNTER — TELEPHONE (OUTPATIENT)
Dept: FAMILY MEDICINE CLINIC | Facility: CLINIC | Age: 58
End: 2018-11-19

## 2018-11-19 NOTE — TELEPHONE ENCOUNTER
PATIENT CALLED ABOUT HER ORDER FOR DIABETIC SHOES. SHE HAS AN ORDER IN THE MEDIA IN October FROM [employer]. IF IT IS NOT PROCESSED BEFORE THE END OF THE YEAR SHE WILL LOSE OUT.  HER  (RANDOLPH FINK) IS SUPPOSED TO HAVE ONE ALSO BUT I DID NO SEE ONE IN HIS CHART AND TOLD HER TO CALL AND HAVE ANOTHER ONE FAXED TODAY.    THANK YOU

## 2018-12-05 DIAGNOSIS — IMO0002 UNCONTROLLED TYPE 2 DIABETES MELLITUS WITH COMPLICATION, WITH LONG-TERM CURRENT USE OF INSULIN: ICD-10-CM

## 2018-12-05 RX ORDER — METFORMIN HYDROCHLORIDE 500 MG/1
TABLET, EXTENDED RELEASE ORAL
Qty: 120 TABLET | Refills: 6 | Status: SHIPPED | OUTPATIENT
Start: 2018-12-05 | End: 2019-02-13

## 2018-12-05 RX ORDER — PANTOPRAZOLE SODIUM 40 MG/1
40 TABLET, DELAYED RELEASE ORAL DAILY
Qty: 30 TABLET | Refills: 6 | Status: SHIPPED | OUTPATIENT
Start: 2018-12-05 | End: 2019-06-26 | Stop reason: SDUPTHER

## 2018-12-26 ENCOUNTER — OFFICE VISIT (OUTPATIENT)
Dept: FAMILY MEDICINE CLINIC | Facility: CLINIC | Age: 58
End: 2018-12-26

## 2018-12-26 ENCOUNTER — APPOINTMENT (OUTPATIENT)
Dept: LAB | Facility: HOSPITAL | Age: 58
End: 2018-12-26

## 2018-12-26 VITALS
BODY MASS INDEX: 53.7 KG/M2 | OXYGEN SATURATION: 98 % | HEART RATE: 83 BPM | HEIGHT: 62 IN | DIASTOLIC BLOOD PRESSURE: 80 MMHG | WEIGHT: 291.8 LBS | SYSTOLIC BLOOD PRESSURE: 132 MMHG

## 2018-12-26 DIAGNOSIS — IMO0002 UNCONTROLLED TYPE 2 DIABETES MELLITUS WITH DIABETIC POLYNEUROPATHY, WITH LONG-TERM CURRENT USE OF INSULIN: ICD-10-CM

## 2018-12-26 DIAGNOSIS — D50.8 OTHER IRON DEFICIENCY ANEMIA: ICD-10-CM

## 2018-12-26 DIAGNOSIS — Z23 FLU VACCINE NEED: ICD-10-CM

## 2018-12-26 DIAGNOSIS — J44.0 CHRONIC OBSTRUCTIVE PULMONARY DISEASE WITH ACUTE LOWER RESPIRATORY INFECTION (HCC): ICD-10-CM

## 2018-12-26 DIAGNOSIS — IMO0002 UNCONTROLLED TYPE 2 DIABETES MELLITUS WITH COMPLICATION, WITH LONG-TERM CURRENT USE OF INSULIN: ICD-10-CM

## 2018-12-26 DIAGNOSIS — E66.9 DIABETES MELLITUS TYPE 2 IN OBESE (HCC): ICD-10-CM

## 2018-12-26 DIAGNOSIS — Z79.899 LONG TERM USE OF DRUG: ICD-10-CM

## 2018-12-26 DIAGNOSIS — E11.69 DIABETES MELLITUS TYPE 2 IN OBESE (HCC): ICD-10-CM

## 2018-12-26 PROCEDURE — G0483 DRUG TEST DEF 22+ CLASSES: HCPCS | Performed by: STUDENT IN AN ORGANIZED HEALTH CARE EDUCATION/TRAINING PROGRAM

## 2018-12-26 PROCEDURE — 80307 DRUG TEST PRSMV CHEM ANLYZR: CPT | Performed by: STUDENT IN AN ORGANIZED HEALTH CARE EDUCATION/TRAINING PROGRAM

## 2018-12-26 PROCEDURE — 90674 CCIIV4 VAC NO PRSV 0.5 ML IM: CPT | Performed by: STUDENT IN AN ORGANIZED HEALTH CARE EDUCATION/TRAINING PROGRAM

## 2018-12-26 PROCEDURE — G0008 ADMIN INFLUENZA VIRUS VAC: HCPCS | Performed by: STUDENT IN AN ORGANIZED HEALTH CARE EDUCATION/TRAINING PROGRAM

## 2018-12-26 PROCEDURE — 99213 OFFICE O/P EST LOW 20 MIN: CPT | Performed by: STUDENT IN AN ORGANIZED HEALTH CARE EDUCATION/TRAINING PROGRAM

## 2018-12-26 RX ORDER — ATORVASTATIN CALCIUM 20 MG/1
20 TABLET, FILM COATED ORAL DAILY
Qty: 30 TABLET | Refills: 3 | Status: SHIPPED | OUTPATIENT
Start: 2018-12-26 | End: 2019-04-16 | Stop reason: SDUPTHER

## 2018-12-26 RX ORDER — CYCLOBENZAPRINE HCL 10 MG
10 TABLET ORAL 2 TIMES DAILY PRN
Qty: 60 TABLET | Refills: 2 | Status: SHIPPED | OUTPATIENT
Start: 2018-12-26 | End: 2019-02-06 | Stop reason: SDUPTHER

## 2018-12-26 RX ORDER — GABAPENTIN 800 MG/1
800 TABLET ORAL 3 TIMES DAILY
Qty: 90 TABLET | Refills: 3 | Status: SHIPPED | OUTPATIENT
Start: 2018-12-26 | End: 2019-02-06 | Stop reason: SDUPTHER

## 2018-12-26 RX ORDER — POTASSIUM CHLORIDE 750 MG/1
10 TABLET, FILM COATED, EXTENDED RELEASE ORAL NIGHTLY
Qty: 30 TABLET | Refills: 3 | Status: SHIPPED | OUTPATIENT
Start: 2018-12-26 | End: 2019-02-06 | Stop reason: SDUPTHER

## 2018-12-26 RX ORDER — FERROUS SULFATE 325(65) MG
325 TABLET ORAL
Qty: 30 TABLET | Refills: 3 | Status: SHIPPED | OUTPATIENT
Start: 2018-12-26 | End: 2019-05-03 | Stop reason: SDUPTHER

## 2018-12-26 RX ORDER — LISINOPRIL 10 MG/1
10 TABLET ORAL DAILY
Qty: 30 TABLET | Refills: 3 | Status: SHIPPED | OUTPATIENT
Start: 2018-12-26 | End: 2019-07-05 | Stop reason: SDUPTHER

## 2018-12-26 RX ORDER — MONTELUKAST SODIUM 10 MG/1
10 TABLET ORAL NIGHTLY
Qty: 30 TABLET | Refills: 3 | Status: SHIPPED | OUTPATIENT
Start: 2018-12-26 | End: 2019-04-16 | Stop reason: SDUPTHER

## 2018-12-26 RX ORDER — PROMETHAZINE HYDROCHLORIDE 25 MG/1
25 TABLET ORAL EVERY 6 HOURS PRN
Qty: 30 TABLET | Refills: 0 | Status: SHIPPED | OUTPATIENT
Start: 2018-12-26 | End: 2019-02-06 | Stop reason: SDUPTHER

## 2018-12-26 RX ORDER — CETIRIZINE HYDROCHLORIDE 10 MG/1
10 TABLET ORAL DAILY
Qty: 30 TABLET | Refills: 3 | Status: SHIPPED | OUTPATIENT
Start: 2018-12-26 | End: 2019-05-03 | Stop reason: SDUPTHER

## 2018-12-27 NOTE — PROGRESS NOTES
"                    Subjective   Yamileth Slater is a 58 y.o. female who presents for follow up for diabetes and medication refill. Current symptoms/problems include paresthesia of the feet and have been unchanged.     Known diabetic complications: peripheral neuropathy and cardiovascular disease  Cardiovascular risk factors: diabetes mellitus, dyslipidemia, hypertension, obesity (BMI >= 30 kg/m2), sedentary lifestyle and smoking/ tobacco exposure  Current diabetic medications include Jardiance, Insulin Lispro, Lantus, and Victoza..     Eye exam current (within one year): yes  Weight trend: stable  Prior visit with dietician: no  Current diet: in general, an \"unhealthy\" diet  Current exercise: none    Current monitoring regimen: home blood tests - two times daily  Home blood sugar records: 100-108  Any episodes of hypoglycemia? no    Is She on ACE inhibitor or angiotensin II receptor blocker?   Yes   lisinopril (generic)    She also needs her gabapentin refilled.    Past Medical Hx:  Past Medical History:   Diagnosis Date   • Anxiety states    • Asthma    • Carotid artery stenosis     DWIGHT 0-15%, LICA 0-15%. LICA stent 5/2014.   • Chronic folliculitis    • Chronic obstructive lung disease (CMS/HCC)    • Coronary arteriosclerosis    • Diabetes mellitus (CMS/HCC)     no retinopathy; A1C 9.1      • Dyslipidemia    • Essential hypertension    • Excoriated eczema     asteosis/keratosis   • GERD (gastroesophageal reflux disease)    • History of colon polyps    • Hypertensive disorder    • Kidney stone    • Mixed hyperlipidemia    • Morbid obesity due to excess calories (CMS/HCC)     BMI 44.5   • Neurologic disorder associated with diabetes mellitus (CMS/HCC)    • Non-healing surgical wound     LLE EVHa   • Peripheral vascular disease (CMS/HCC)    • Sleep apnea    • Tobacco dependence syndrome     1/2ppd      • Type 2 diabetes mellitus (CMS/HCC)    • Vitamin D deficiency        Past Surgical Hx:  Past Surgical " History:   Procedure Laterality Date   • CARDIAC CATHETERIZATION  08/09/2013    Severe multivessel CAD with critical lesions noted in the RCA, obtuse marginal two, ramus intermemdious, and LAD as described above. Normal LV systolic function with no wall motion abnormality.    • CARDIAC CATHETERIZATION  05/27/2014     LEFT Carotid Stent    • CAROTID STENT Left 05/27/2014   • COLONOSCOPY  05/02/2016    Normal colon.Diverticulosis in the sigmoid colon.No specimens collected.    • CORONARY ARTERY BYPASS GRAFT  11/15/2013    CABG x 3 with LIMA to LAD and coronary endarterectomy to LAD, SVG to Ramus intermedius branch and SVG to PDA.    • CYSTOSCOPY URETEROSCOPY LASER LITHOTRIPSY     • ENDOSCOPY  09/23/2015     Esophageal stricture present.Normal stomach.Normal duodenum.    • ENDOSCOPY  11/16/2015    w/ dilatation - Esophageal stricture present,Dilatation performed.Normal stomach and duodenum.    • HYSTERECTOMY     • INCISION AND DRAINAGE ABSCESS  01/02/2016    Incision and drainage of right perineal abscess.    • INCISION AND DRAINAGE ABSCESS  02/18/2014    Incision and Drainage of abscess of left lower leg    • OTHER SURGICAL HISTORY  01/25/2016    DESTRUCTION OF BENIGN LESION      • OTHER SURGICAL HISTORY  04/18/2014    ear/neck - L attempted CEA, Neck Dissection    • TUBAL ABDOMINAL LIGATION         Health Maintenance:  Health Maintenance   Topic Date Due   • HEPATITIS A VACCINE ADULT (1 of 2) 01/23/2019 (Originally 3/10/1978)   • MEDICARE ANNUAL WELLNESS  01/25/2019 (Originally 8/24/2016)   • DIABETIC EYE EXAM  02/14/2019 (Originally 8/24/2016)   • URINE MICROALBUMIN  02/21/2019 (Originally 10/5/2018)   • LUNG CANCER SCREENING  02/22/2019 (Originally 3/14/2017)   • ZOSTER VACCINE (1 of 2) 12/27/2019 (Originally 3/10/2009)   • MAMMOGRAM  01/04/2019   • HEMOGLOBIN A1C  03/10/2019   • LIPID PANEL  04/04/2019   • DIABETIC FOOT EXAM  12/26/2019   • PAP SMEAR  01/04/2020   • COLONOSCOPY  05/02/2021   • TDAP/TD VACCINES (2  - Td) 11/10/2024   • PNEUMOCOCCAL VACCINE (19-64 MEDIUM RISK)  Completed   • HEPATITIS C SCREENING  Completed   • INFLUENZA VACCINE  Completed       Current Meds:    Current Outpatient Medications:   •  albuterol (ACCUNEB) 0.63 MG/3ML nebulizer solution, Take 3 mL by nebulization Every 6 (Six) Hours As Needed for Wheezing., Disp: 20 vial, Rfl: 0  •  albuterol (PROVENTIL HFA;VENTOLIN HFA) 108 (90 Base) MCG/ACT inhaler, Inhale 2 puffs Every 4 (Four) Hours As Needed for Wheezing., Disp: 1 inhaler, Rfl: 0  •  atorvastatin (LIPITOR) 20 MG tablet, Take 1 tablet by mouth Daily., Disp: 30 tablet, Rfl: 3  •  betamethasone valerate (VALISONE) 0.1 % cream, Apply  topically to the appropriate area as directed Daily., Disp: 45 g, Rfl: 2  •  Blood Glucose Monitoring Suppl ([employer] BLOOD GLUCOSE METER) W/DEVICE kit, 1 each 3 (Three) Times a Day., Disp: 1 kit, Rfl: 3  •  budesonide-formoterol (SYMBICORT) 160-4.5 MCG/ACT inhaler, Inhale 2 puffs 2 (Two) Times a Day., Disp: 10.2 g, Rfl: 6  •  cetirizine (zyrTEC) 10 MG tablet, Take 1 tablet by mouth Daily., Disp: 30 tablet, Rfl: 3  •  clopidogrel (PLAVIX) 75 MG tablet, Take 1 tablet by mouth Daily., Disp: 30 tablet, Rfl: 3  •  cyclobenzaprine (FLEXERIL) 10 MG tablet, Take 1 tablet by mouth 2 (Two) Times a Day As Needed for Muscle Spasms., Disp: 60 tablet, Rfl: 2  •  Empagliflozin (JARDIANCE) 10 MG tablet, Take 10 mg by mouth Every Morning., Disp: 90 tablet, Rfl: 3  •  ferrous sulfate (FERROUSUL) 325 (65 FE) MG tablet, Take 1 tablet by mouth Daily With Breakfast., Disp: 30 tablet, Rfl: 3  •  furosemide (LASIX) 40 MG tablet, TAKE 1 TABLET BY MOUTH DAILY., Disp: 30 tablet, Rfl: 3  •  gabapentin (NEURONTIN) 800 MG tablet, Take 1 tablet by mouth 3 (Three) Times a Day. For neuropathy, Disp: 90 tablet, Rfl: 3  •  glucose blood test strip, Use as instructed, Disp: 100 each, Rfl: 12  •  ibuprofen (ADVIL,MOTRIN) 800 MG tablet, Take 1 tablet by mouth Every 6 (Six) Hours As Needed for Mild Pain .,  Disp: 20 tablet, Rfl: 0  •  Insulin Lispro (HUMALOG KWIKPEN) 100 UNIT/ML solution pen-injector, Inject 45 Units under the skin 3 (Three) Times a Day Before Meals., Disp: 60 mL, Rfl: 11  •  Insulin Pen Needle (NOVOFINE) 30G X 8 MM misc, As directed 4 times daily, Disp: 100 each, Rfl: 11  •  ipratropium-albuterol (DUO-NEB) 0.5-2.5 mg/3 ml nebulizer, Take 3 mL by nebulization 4 (Four) Times a Day. ICD10 code J96.01, Disp: 360 mL, Rfl: 0  •  LANTUS SOLOSTAR 100 UNIT/ML injection pen, Inject 95 Units under the skin into the appropriate area as directed Every 12 (Twelve) Hours., Disp: 3 mL, Rfl: 11  •  lidocaine (LIDODERM) 5 %, Place 1 patch on the skin as directed by provider Daily. Remove & Discard patch within 12 hours or as directed by MD, Disp: 15 each, Rfl: 0  •  lidocaine (XYLOCAINE) 5 % ointment, Apply  topically to the appropriate area as directed Every 2 (Two) Hours As Needed for Mild Pain ., Disp: 1 tube, Rfl: 0  •  Liraglutide (VICTOZA) 18 MG/3ML solution pen-injector injection, Inject 1.2 mg under the skin into the appropriate area as directed Every Morning., Disp: 9 mL, Rfl: 3  •  lisinopril (PRINIVIL,ZESTRIL) 10 MG tablet, Take 1 tablet by mouth Daily., Disp: 30 tablet, Rfl: 3  •  meloxicam (MOBIC) 7.5 MG tablet, Take 1 tablet by mouth Daily. Start medication on 8/24/18, Disp: 30 tablet, Rfl: 1  •  metFORMIN ER (GLUCOPHAGE-XR) 500 MG 24 hr tablet, TAKE 2 TABLET BY MOUTH 2 (TWO) TIMES A DAY WITH MEALS., Disp: 120 tablet, Rfl: 6  •  methocarbamol (ROBAXIN) 750 MG tablet, Take 1 tablet by mouth 4 (Four) Times a Day As Needed for Muscle Spasms., Disp: 30 tablet, Rfl: 1  •  metoprolol tartrate (LOPRESSOR) 50 MG tablet, Take 1 tablet by mouth Every 12 (Twelve) Hours., Disp: 60 tablet, Rfl: 3  •  montelukast (SINGULAIR) 10 MG tablet, Take 1 tablet by mouth Every Night., Disp: 30 tablet, Rfl: 3  •  nitroglycerin (NITROSTAT) 0.4 MG SL tablet, Place 1 tablet under the tongue As Needed for Chest Pain. Take no more  than 3 doses in 15 minutes., Disp: 30 tablet, Rfl: 3  •  nystatin (MYCOSTATIN) 870850 UNIT/GM powder, Apply  topically to the appropriate area as directed 3 (Three) Times a Day., Disp: 15 g, Rfl: 1  •  pantoprazole (PROTONIX) 40 MG EC tablet, TAKE 1 TABLET BY MOUTH DAILY., Disp: 30 tablet, Rfl: 6  •  potassium chloride (K-DUR) 10 MEQ CR tablet, Take 1 tablet by mouth Every Night., Disp: 30 tablet, Rfl: 3  •  promethazine (PHENERGAN) 25 MG tablet, Take 1 tablet by mouth Every 6 (Six) Hours As Needed for Nausea or Vomiting., Disp: 30 tablet, Rfl: 0  •  valACYclovir (VALTREX) 1000 MG tablet, Take 1 tablet by mouth 3 (Three) Times a Day., Disp: 21 tablet, Rfl: 0    Allergies:  Other    Family Hx:  Family History   Problem Relation Age of Onset   • Heart disease Mother    • Cancer Mother    • Heart disease Father    • Heart disease Sister    • Cancer Sister    • Heart disease Brother         Social History:  Social History     Socioeconomic History   • Marital status:      Spouse name: wesley dumont   • Number of children: Not on file   • Years of education: Not on file   • Highest education level: Not on file   Social Needs   • Financial resource strain: Not on file   • Food insecurity - worry: Not on file   • Food insecurity - inability: Not on file   • Transportation needs - medical: Not on file   • Transportation needs - non-medical: Not on file   Occupational History   • Not on file   Tobacco Use   • Smoking status: Current Every Day Smoker     Packs/day: 1.00     Years: 40.00     Pack years: 40.00     Types: Cigarettes   • Smokeless tobacco: Never Used   Substance and Sexual Activity   • Alcohol use: No   • Drug use: Yes     Types: LSD, Marijuana, Methamphetamines     Comment: Lasted used in 2006   • Sexual activity: Not Currently     Partners: Male   Other Topics Concern   • Not on file   Social History Narrative   • Not on file       Review of Systems  Review of Systems   Constitutional: Negative for  "fatigue, fever and unexpected weight change.   HENT: Negative for congestion, sore throat and trouble swallowing.    Respiratory: Negative for chest tightness and shortness of breath.    Cardiovascular: Negative for chest pain and palpitations.   Gastrointestinal: Negative for abdominal distention and abdominal pain.   Genitourinary: Negative for difficulty urinating and dysuria.   Musculoskeletal: Negative for arthralgias and myalgias.   Skin: Negative for color change and pallor.   Neurological: Negative for weakness, light-headedness and headaches.   Psychiatric/Behavioral: Negative for agitation and behavioral problems.          Objective:     /80   Pulse 83   Ht 157.5 cm (62\")   Wt 132 kg (291 lb 12.8 oz)   LMP  (LMP Unknown)   SpO2 98%   Breastfeeding? No   BMI 53.37 kg/m²       Physical Exam   Constitutional: She is oriented to person, place, and time. She appears well-developed and well-nourished. No distress.   HENT:   Head: Normocephalic and atraumatic.   Right Ear: External ear normal.   Left Ear: External ear normal.   Eyes: EOM are normal. Pupils are equal, round, and reactive to light.   Neck: Normal range of motion. Neck supple.   Cardiovascular: Normal rate, regular rhythm and normal heart sounds. Exam reveals no gallop and no friction rub.   No murmur heard.  Pulmonary/Chest: Effort normal. No respiratory distress. She has decreased breath sounds. She has no wheezes. She has no rales.   Abdominal: Soft. Bowel sounds are normal. She exhibits no distension. There is no tenderness.   Musculoskeletal: Normal range of motion. She exhibits no deformity.   Pre ulcerative callus        Neurological: She is alert and oriented to person, place, and time.   Skin: Skin is warm. No erythema.   Psychiatric: She has a normal mood and affect. Her behavior is normal.       Assessment/Plan:     1. Uncontrolled type 2 diabetes mellitus with diabetic polyneuropathy, with long-term current use of insulin " (CMS/[facility])   - It appears that she has kayleen able to get her diabetes under control. Will continue her current medication regimen.     2. Routine maintenance   - Influenza vaccine  - Medication refill        Follow-up:     Return in about 4 weeks (around 1/23/2019).    Goals     • Fasting Blood Glucose       Barriers: compliance with diet      • Quit smoking / using tobacco           Preventative:    Vaccines Recommended at this visit:   Influenza, Shingrix and Hepatitis A    Vaccines Received at this visit:  Influenza    Screenings Recommended at this visit:  None    Screenings Ordered at this visit:  None    Smoking Status:  Patient is current smoker. Patient is not interested in smoking cessation.    Alcohol Intake:  Patient does not drink    Patient's Body mass index is 53.37 kg/m². BMI is above normal parameters. Recommendations include: exercise counseling and nutrition counseling.         RISK SCORE: 3          This document has been electronically signed by Nirmal Calvillo MD on December 27, 2018 7:15 PM

## 2018-12-28 NOTE — PROGRESS NOTES
"Yamileth Slater is a 58 y.o. female who presents for   Chief Complaint   Patient presents with   • Diabetic Neuropathy     patient needing refills on Gabapentin   • Diabetes     diabetic foot exam         /80   Pulse 83   Ht 157.5 cm (62\")   Wt 132 kg (291 lb 12.8 oz)   LMP  (LMP Unknown)   SpO2 98%   Breastfeeding? No   BMI 53.37 kg/m²   Current Outpatient Medications:   •  albuterol (ACCUNEB) 0.63 MG/3ML nebulizer solution, Take 3 mL by nebulization Every 6 (Six) Hours As Needed for Wheezing., Disp: 20 vial, Rfl: 0  •  albuterol (PROVENTIL HFA;VENTOLIN HFA) 108 (90 Base) MCG/ACT inhaler, Inhale 2 puffs Every 4 (Four) Hours As Needed for Wheezing., Disp: 1 inhaler, Rfl: 0  •  atorvastatin (LIPITOR) 20 MG tablet, Take 1 tablet by mouth Daily., Disp: 30 tablet, Rfl: 3  •  betamethasone valerate (VALISONE) 0.1 % cream, Apply  topically to the appropriate area as directed Daily., Disp: 45 g, Rfl: 2  •  Blood Glucose Monitoring Suppl ([employer] BLOOD GLUCOSE METER) W/DEVICE kit, 1 each 3 (Three) Times a Day., Disp: 1 kit, Rfl: 3  •  budesonide-formoterol (SYMBICORT) 160-4.5 MCG/ACT inhaler, Inhale 2 puffs 2 (Two) Times a Day., Disp: 10.2 g, Rfl: 6  •  cetirizine (zyrTEC) 10 MG tablet, Take 1 tablet by mouth Daily., Disp: 30 tablet, Rfl: 3  •  clopidogrel (PLAVIX) 75 MG tablet, Take 1 tablet by mouth Daily., Disp: 30 tablet, Rfl: 3  •  cyclobenzaprine (FLEXERIL) 10 MG tablet, Take 1 tablet by mouth 2 (Two) Times a Day As Needed for Muscle Spasms., Disp: 60 tablet, Rfl: 2  •  Empagliflozin (JARDIANCE) 10 MG tablet, Take 10 mg by mouth Every Morning., Disp: 90 tablet, Rfl: 3  •  ferrous sulfate (FERROUSUL) 325 (65 FE) MG tablet, Take 1 tablet by mouth Daily With Breakfast., Disp: 30 tablet, Rfl: 3  •  furosemide (LASIX) 40 MG tablet, TAKE 1 TABLET BY MOUTH DAILY., Disp: 30 tablet, Rfl: 3  •  gabapentin (NEURONTIN) 800 MG tablet, Take 1 tablet by mouth 3 (Three) Times a Day. For neuropathy, Disp: 90 tablet, Rfl: " 3  •  glucose blood test strip, Use as instructed, Disp: 100 each, Rfl: 12  •  ibuprofen (ADVIL,MOTRIN) 800 MG tablet, Take 1 tablet by mouth Every 6 (Six) Hours As Needed for Mild Pain ., Disp: 20 tablet, Rfl: 0  •  Insulin Lispro (HUMALOG KWIKPEN) 100 UNIT/ML solution pen-injector, Inject 45 Units under the skin 3 (Three) Times a Day Before Meals., Disp: 60 mL, Rfl: 11  •  Insulin Pen Needle (NOVOFINE) 30G X 8 MM misc, As directed 4 times daily, Disp: 100 each, Rfl: 11  •  ipratropium-albuterol (DUO-NEB) 0.5-2.5 mg/3 ml nebulizer, Take 3 mL by nebulization 4 (Four) Times a Day. ICD10 code J96.01, Disp: 360 mL, Rfl: 0  •  LANTUS SOLOSTAR 100 UNIT/ML injection pen, Inject 95 Units under the skin into the appropriate area as directed Every 12 (Twelve) Hours., Disp: 3 mL, Rfl: 11  •  lidocaine (LIDODERM) 5 %, Place 1 patch on the skin as directed by provider Daily. Remove & Discard patch within 12 hours or as directed by MD, Disp: 15 each, Rfl: 0  •  lidocaine (XYLOCAINE) 5 % ointment, Apply  topically to the appropriate area as directed Every 2 (Two) Hours As Needed for Mild Pain ., Disp: 1 tube, Rfl: 0  •  Liraglutide (VICTOZA) 18 MG/3ML solution pen-injector injection, Inject 1.2 mg under the skin into the appropriate area as directed Every Morning., Disp: 9 mL, Rfl: 3  •  lisinopril (PRINIVIL,ZESTRIL) 10 MG tablet, Take 1 tablet by mouth Daily., Disp: 30 tablet, Rfl: 3  •  meloxicam (MOBIC) 7.5 MG tablet, Take 1 tablet by mouth Daily. Start medication on 8/24/18, Disp: 30 tablet, Rfl: 1  •  metFORMIN ER (GLUCOPHAGE-XR) 500 MG 24 hr tablet, TAKE 2 TABLET BY MOUTH 2 (TWO) TIMES A DAY WITH MEALS., Disp: 120 tablet, Rfl: 6  •  methocarbamol (ROBAXIN) 750 MG tablet, Take 1 tablet by mouth 4 (Four) Times a Day As Needed for Muscle Spasms., Disp: 30 tablet, Rfl: 1  •  metoprolol tartrate (LOPRESSOR) 50 MG tablet, Take 1 tablet by mouth Every 12 (Twelve) Hours., Disp: 60 tablet, Rfl: 3  •  montelukast (SINGULAIR) 10 MG  tablet, Take 1 tablet by mouth Every Night., Disp: 30 tablet, Rfl: 3  •  nitroglycerin (NITROSTAT) 0.4 MG SL tablet, Place 1 tablet under the tongue As Needed for Chest Pain. Take no more than 3 doses in 15 minutes., Disp: 30 tablet, Rfl: 3  •  nystatin (MYCOSTATIN) [id number] UNIT/GM powder, Apply  topically to the appropriate area as directed 3 (Three) Times a Day., Disp: 15 g, Rfl: 1  •  pantoprazole (PROTONIX) 40 MG EC tablet, TAKE 1 TABLET BY MOUTH DAILY., Disp: 30 tablet, Rfl: 6  •  potassium chloride (K-DUR) 10 MEQ CR tablet, Take 1 tablet by mouth Every Night., Disp: 30 tablet, Rfl: 3  •  promethazine (PHENERGAN) 25 MG tablet, Take 1 tablet by mouth Every 6 (Six) Hours As Needed for Nausea or Vomiting., Disp: 30 tablet, Rfl: 0  •  valACYclovir (VALTREX) 1000 MG tablet, Take 1 tablet by mouth 3 (Three) Times a Day., Disp: 21 tablet, Rfl: 0  Allergies   Allergen Reactions   • Other      Bandaids, MRSA, SURECLOSE       Physical Exam   Constitutional: She appears well-developed and well-nourished.   Eyes: EOM are normal.   Neck: Normal range of motion. Neck supple. Carotid bruit is not present.   Cardiovascular: Normal rate, regular rhythm and normal heart sounds. Exam reveals no gallop and no friction rub.   No murmur heard.      Assessment:     1. Uncontrolled type 2 diabetes mellitus with diabetic polyneuropathy, with long-term current use of insulin (CMS/HCC)    2. Flu vaccine need    3. Long term use of drug    4. Other iron deficiency anemia    5. Chronic obstructive pulmonary disease with acute lower respiratory infection (CMS/HCC)    6. Diabetes mellitus type 2 in obese (CMS/HCC)    7. Uncontrolled type 2 diabetes mellitus with complication, with long-term current use of insulin (CMS/HCC)       Plan:I have seen this patient and discussed the case with resident and agree with the assessment and plan.  ROSELIA Liz M.D.

## 2018-12-29 LAB — CONV REPORT SUMMARY: NORMAL

## 2019-01-09 ENCOUNTER — OFFICE VISIT (OUTPATIENT)
Dept: FAMILY MEDICINE CLINIC | Facility: CLINIC | Age: 59
End: 2019-01-09

## 2019-01-09 VITALS
DIASTOLIC BLOOD PRESSURE: 82 MMHG | HEIGHT: 62 IN | WEIGHT: 292.6 LBS | HEART RATE: 94 BPM | SYSTOLIC BLOOD PRESSURE: 142 MMHG | OXYGEN SATURATION: 96 % | BODY MASS INDEX: 53.84 KG/M2

## 2019-01-09 DIAGNOSIS — M79.641 PAIN IN BOTH HANDS: Primary | ICD-10-CM

## 2019-01-09 DIAGNOSIS — M79.642 PAIN IN BOTH HANDS: Primary | ICD-10-CM

## 2019-01-09 PROCEDURE — 99213 OFFICE O/P EST LOW 20 MIN: CPT | Performed by: STUDENT IN AN ORGANIZED HEALTH CARE EDUCATION/TRAINING PROGRAM

## 2019-01-10 NOTE — PROGRESS NOTES
Subjective   Yamileth Slater is a 58 y.o. female who presents for initial evaluation of increased sensitivity to cold in both her hands. It has been present for the last four months. The codie is episodic and does not happen every time it is cold. The pain is sharp and starts at the base of her fingers and radiates distally. It is exacerbated by cold and relieved by warming her hands. Her gabapentin does not relieve the pain. She denied any discoloration of her hands or fingernail beds. She also has constant dull pain in bilateral palms which is not associated to the cold. It is exacerbated with trying to open bottles and not relieved by tylenol or ibuprofen.       Past Medical Hx:  Past Medical History:   Diagnosis Date   • Anxiety states    • Asthma    • Carotid artery stenosis     DWIGHT 0-15%, LICA 0-15%. LICA stent 5/2014.   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 Normal LV systolic function with no wall motion abnormality.    • CARDIAC  CATHETERIZATION  05/27/2014     LEFT Carotid Stent    • CAROTID STENT Left 05/27/2014   • COLONOSCOPY  05/02/2016    Normal colon.Diverticulosis in the sigmoid colon.No specimens collected.    • CORONARY ARTERY BYPASS GRAFT  11/15/2013    CABG x 3 with LIMA to LAD and coronary endarterectomy to LAD, SVG to Ramus intermedius branch and SVG to PDA.    • CYSTOSCOPY URETEROSCOPY LASER LITHOTRIPSY     • ENDOSCOPY  09/23/2015     Esophageal stricture present.Normal stomach.Normal duodenum.    • ENDOSCOPY  11/16/2015    w/ dilatation - Esophageal stricture present,Dilatation performed.Normal stomach and duodenum.    • HYSTERECTOMY     • INCISION AND DRAINAGE ABSCESS  01/02/2016    Incision and drainage of right perineal abscess.    • INCISION AND DRAINAGE ABSCESS  02/18/2014    Incision and Drainage of abscess of left lower leg    • OTHER SURGICAL HISTORY  01/25/2016    DESTRUCTION OF BENIGN LESION      • OTHER SURGICAL HISTORY  04/18/2014    ear/neck - L attempted CEA, Neck Dissection    • TUBAL ABDOMINAL LIGATION         Health Maintenance:  Health Maintenance   Topic Date Due   • MAMMOGRAM  01/04/2019   • HEPATITIS A VACCINE ADULT (1 of 2) 01/23/2019 (Originally 3/10/1978)   • MEDICARE ANNUAL WELLNESS  01/25/2019 (Originally 8/24/2016)   • DIABETIC EYE EXAM  02/14/2019 (Originally 8/24/2016)   • URINE MICROALBUMIN  02/21/2019 (Originally 10/5/2018)   • LUNG CANCER SCREENING  02/22/2019 (Originally 3/14/2017)   • ZOSTER VACCINE (1 of 2) 12/27/2019 (Originally 3/10/2009)   • HEMOGLOBIN A1C  03/10/2019   • LIPID PANEL  04/04/2019   • DIABETIC FOOT EXAM  12/26/2019   • PAP SMEAR  01/04/2020   • COLONOSCOPY  05/02/2021   • TDAP/TD VACCINES (2 - Td) 11/10/2024   • PNEUMOCOCCAL VACCINE (19-64 MEDIUM RISK)  Completed   • HEPATITIS C SCREENING  Completed   • INFLUENZA VACCINE  Completed       Current Meds:    Current Outpatient Medications:   •  albuterol (ACCUNEB) 0.63 MG/3ML nebulizer solution, Take 3 mL by nebulization Every  6 (Six) Hours As Needed for Wheezing., Disp: 20 vial, Rfl: 0  •  albuterol (PROVENTIL HFA;VENTOLIN HFA) 108 (90 Base) MCG/ACT inhaler, Inhale 2 puffs Every 4 (Four) Hours As Needed for Wheezing., Disp: 1 inhaler, Rfl: 0  •  atorvastatin (LIPITOR) 20 MG tablet, Take 1 tablet by mouth Daily., Disp: 30 tablet, Rfl: 3  •  betamethasone valerate (VALISONE) 0.1 % cream, Apply  topically to the appropriate area as directed Daily., Disp: 45 g, Rfl: 2  •  Blood Glucose Monitoring Suppl ([employer] BLOOD GLUCOSE METER) W/DEVICE kit, 1 each 3 (Three) Times a Day., Disp: 1 kit, Rfl: 3  •  budesonide-formoterol (SYMBICORT) 160-4.5 MCG/ACT inhaler, Inhale 2 puffs 2 (Two) Times a Day., Disp: 10.2 g, Rfl: 6  •  cetirizine (zyrTEC) 10 MG tablet, Take 1 tablet by mouth Daily., Disp: 30 tablet, Rfl: 3  •  clopidogrel (PLAVIX) 75 MG tablet, Take 1 tablet by mouth Daily., Disp: 30 tablet, Rfl: 3  •  cyclobenzaprine (FLEXERIL) 10 MG tablet, Take 1 tablet by mouth 2 (Two) Times a Day As Needed for Muscle Spasms., Disp: 60 tablet, Rfl: 2  •  Empagliflozin (JARDIANCE) 10 MG tablet, Take 10 mg by mouth Every Morning., Disp: 90 tablet, Rfl: 3  •  ferrous sulfate (FERROUSUL) 325 (65 FE) MG tablet, Take 1 tablet by mouth Daily With Breakfast., Disp: 30 tablet, Rfl: 3  •  furosemide (LASIX) 40 MG tablet, TAKE 1 TABLET BY MOUTH DAILY., Disp: 30 tablet, Rfl: 3  •  gabapentin (NEURONTIN) 800 MG tablet, Take 1 tablet by mouth 3 (Three) Times a Day. For neuropathy, Disp: 90 tablet, Rfl: 3  •  glucose blood test strip, Use as instructed, Disp: 100 each, Rfl: 12  •  ibuprofen (ADVIL,MOTRIN) 800 MG tablet, Take 1 tablet by mouth Every 6 (Six) Hours As Needed for Mild Pain ., Disp: 20 tablet, Rfl: 0  •  Insulin Lispro (HUMALOG KWIKPEN) 100 UNIT/ML solution pen-injector, Inject 45 Units under the skin 3 (Three) Times a Day Before Meals., Disp: 60 mL, Rfl: 11  •  Insulin Pen Needle (NOVOFINE) 30G X 8 MM misc, As directed 4 times daily, Disp: 100 each, Rfl: 11  •   ipratropium-albuterol (DUO-NEB) 0.5-2.5 mg/3 ml nebulizer, Take 3 mL by nebulization 4 (Four) Times a Day. ICD10 code J96.01, Disp: 360 mL, Rfl: 0  •  LANTUS SOLOSTAR 100 UNIT/ML injection pen, Inject 95 Units under the skin into the appropriate area as directed Every 12 (Twelve) Hours., Disp: 3 mL, Rfl: 11  •  lidocaine (LIDODERM) 5 %, Place 1 patch on the skin as directed by provider Daily. Remove & Discard patch within 12 hours or as directed by MD, Disp: 15 each, Rfl: 0  •  lidocaine (XYLOCAINE) 5 % ointment, Apply  topically to the appropriate area as directed Every 2 (Two) Hours As Needed for Mild Pain ., Disp: 1 tube, Rfl: 0  •  Liraglutide (VICTOZA) 18 MG/3ML solution pen-injector injection, Inject 1.2 mg under the skin into the appropriate area as directed Every Morning., Disp: 9 mL, Rfl: 3  •  lisinopril (PRINIVIL,ZESTRIL) 10 MG tablet, Take 1 tablet by mouth Daily., Disp: 30 tablet, Rfl: 3  •  meloxicam (MOBIC) 7.5 MG tablet, Take 1 tablet by mouth Daily. Start medication on 8/24/18, Disp: 30 tablet, Rfl: 1  •  metFORMIN ER (GLUCOPHAGE-XR) 500 MG 24 hr tablet, TAKE 2 TABLET BY MOUTH 2 (TWO) TIMES A DAY WITH MEALS., Disp: 120 tablet, Rfl: 6  •  methocarbamol (ROBAXIN) 750 MG tablet, Take 1 tablet by mouth 4 (Four) Times a Day As Needed for Muscle Spasms., Disp: 30 tablet, Rfl: 1  •  metoprolol tartrate (LOPRESSOR) 50 MG tablet, Take 1 tablet by mouth Every 12 (Twelve) Hours., Disp: 60 tablet, Rfl: 3  •  montelukast (SINGULAIR) 10 MG tablet, Take 1 tablet by mouth Every Night., Disp: 30 tablet, Rfl: 3  •  nitroglycerin (NITROSTAT) 0.4 MG SL tablet, Place 1 tablet under the tongue As Needed for Chest Pain. Take no more than 3 doses in 15 minutes., Disp: 30 tablet, Rfl: 3  •  nystatin (MYCOSTATIN) 504578 UNIT/GM powder, Apply  topically to the appropriate area as directed 3 (Three) Times a Day., Disp: 15 g, Rfl: 1  •  pantoprazole (PROTONIX) 40 MG EC tablet, TAKE 1 TABLET BY MOUTH DAILY., Disp: 30 tablet,  Rfl: 6  •  potassium chloride (K-DUR) 10 MEQ CR tablet, Take 1 tablet by mouth Every Night., Disp: 30 tablet, Rfl: 3  •  promethazine (PHENERGAN) 25 MG tablet, Take 1 tablet by mouth Every 6 (Six) Hours As Needed for Nausea or Vomiting., Disp: 30 tablet, Rfl: 0  •  valACYclovir (VALTREX) 1000 MG tablet, Take 1 tablet by mouth 3 (Three) Times a Day., Disp: 21 tablet, Rfl: 0    Allergies:  Other    Family Hx:  Family History   Problem Relation Age of Onset   • Heart disease Mother    • Cancer Mother    • Heart disease Father    • Heart disease Sister    • Cancer Sister    • Heart disease Brother         Social History:  Social History     Socioeconomic History   • Marital status:      Spouse name: wesley dumont   • Number of children: Not on file   • Years of education: Not on file   • Highest education level: Not on file   Social Needs   • Financial resource strain: Not on file   • Food insecurity - worry: Not on file   • Food insecurity - inability: Not on file   • Transportation needs - medical: Not on file   • Transportation needs - non-medical: Not on file   Occupational History   • Not on file   Tobacco Use   • Smoking status: Current Every Day Smoker     Packs/day: 1.00     Years: 40.00     Pack years: 40.00     Types: Cigarettes   • Smokeless tobacco: Never Used   Substance and Sexual Activity   • Alcohol use: No   • Drug use: Yes     Types: LSD, Marijuana, Methamphetamines     Comment: Lasted used in 2006   • Sexual activity: Not Currently     Partners: Male   Other Topics Concern   • Not on file   Social History Narrative   • Not on file       Review of Systems  Review of Systems   Constitutional: Negative for fatigue and fever.   HENT: Negative for congestion, sore throat and trouble swallowing.    Respiratory: Negative for chest tightness and shortness of breath.    Cardiovascular: Negative for chest pain and palpitations.   Gastrointestinal: Negative for abdominal distention and abdominal pain.  "  Genitourinary: Negative for difficulty urinating and dysuria.   Musculoskeletal: Positive for myalgias. Negative for arthralgias.   Skin: Negative for color change and pallor.   Neurological: Positive for weakness (In her hands). Negative for tremors, light-headedness and headaches.   Psychiatric/Behavioral: Negative for agitation and behavioral problems.          Objective:     /82   Pulse 94   Ht 157.5 cm (62\")   Wt 133 kg (292 lb 9.6 oz)   LMP  (LMP Unknown)   SpO2 96%   Breastfeeding? No   BMI 53.52 kg/m²       Physical Exam   Constitutional: She is oriented to person, place, and time. She appears well-developed and well-nourished. No distress.   HENT:   Head: Normocephalic and atraumatic.   Right Ear: External ear normal.   Left Ear: External ear normal.   Eyes: EOM are normal. Pupils are equal, round, and reactive to light.   Neck: Normal range of motion. Neck supple.   Cardiovascular: Normal rate, regular rhythm and normal heart sounds. Exam reveals no gallop and no friction rub.   No murmur heard.  Pulmonary/Chest: Effort normal and breath sounds normal. No respiratory distress. She has no wheezes. She has no rales.   Abdominal: Soft. Bowel sounds are normal. She exhibits no distension. There is no tenderness.   Musculoskeletal: Normal range of motion. She exhibits no deformity.   Bilateral palms felt tense. No trauma, discoloration, or deformity.    Bilateral fingers were warm, without trauma, or visible deformity.   Neurological: She is alert and oriented to person, place, and time.   Skin: Skin is warm. No erythema.   Psychiatric: She has a normal mood and affect. Her behavior is normal.       Assessment/Plan:     1. Pain in both hands   - Advised to avoid the cold   - Continue pain control for hands and will reevaluate at next visit.        Follow-up:     Return in about 2 weeks (around 1/23/2019).    Goals     • Fasting Blood Glucose       Barriers: compliance with diet      • Quit " smoking / using tobacco           Preventative:    Vaccines Recommended at this visit:   None    Vaccines Received at this visit:  None    Screenings Recommended at this visit:  None    Screenings Ordered at this visit:  None    Smoking Status:  Patient is current smoker. Patient is not interested in smoking cessation.    Alcohol Intake:  Patient does not drink    Patient's Body mass index is 53.52 kg/m². BMI is above normal parameters. Recommendations include: exercise counseling and nutrition counseling.       RISK SCORE: 3          This document has been electronically signed by Nirmal Calvillo MD on January 10, 2019 9:13 AM

## 2019-01-14 NOTE — PROGRESS NOTES
"Yamileth Slater is a 58 y.o. female who presents for   Chief Complaint   Patient presents with   • Diabetes     1 mo recheck         /82   Pulse 94   Ht 157.5 cm (62\")   Wt 133 kg (292 lb 9.6 oz)   LMP  (LMP Unknown)   SpO2 96%   Breastfeeding? No   BMI 53.52 kg/m²   Current Outpatient Medications:   •  albuterol (ACCUNEB) 0.63 MG/3ML nebulizer solution, Take 3 mL by nebulization Every 6 (Six) Hours As Needed for Wheezing., Disp: 20 vial, Rfl: 0  •  albuterol (PROVENTIL HFA;VENTOLIN HFA) 108 (90 Base) MCG/ACT inhaler, Inhale 2 puffs Every 4 (Four) Hours As Needed for Wheezing., Disp: 1 inhaler, Rfl: 0  •  atorvastatin (LIPITOR) 20 MG tablet, Take 1 tablet by mouth Daily., Disp: 30 tablet, Rfl: 3  •  betamethasone valerate (VALISONE) 0.1 % cream, Apply  topically to the appropriate area as directed Daily., Disp: 45 g, Rfl: 2  •  Blood Glucose Monitoring Suppl ([employer] BLOOD GLUCOSE METER) W/DEVICE kit, 1 each 3 (Three) Times a Day., Disp: 1 kit, Rfl: 3  •  budesonide-formoterol (SYMBICORT) 160-4.5 MCG/ACT inhaler, Inhale 2 puffs 2 (Two) Times a Day., Disp: 10.2 g, Rfl: 6  •  cetirizine (zyrTEC) 10 MG tablet, Take 1 tablet by mouth Daily., Disp: 30 tablet, Rfl: 3  •  clopidogrel (PLAVIX) 75 MG tablet, Take 1 tablet by mouth Daily., Disp: 30 tablet, Rfl: 3  •  cyclobenzaprine (FLEXERIL) 10 MG tablet, Take 1 tablet by mouth 2 (Two) Times a Day As Needed for Muscle Spasms., Disp: 60 tablet, Rfl: 2  •  Empagliflozin (JARDIANCE) 10 MG tablet, Take 10 mg by mouth Every Morning., Disp: 90 tablet, Rfl: 3  •  ferrous sulfate (FERROUSUL) 325 (65 FE) MG tablet, Take 1 tablet by mouth Daily With Breakfast., Disp: 30 tablet, Rfl: 3  •  furosemide (LASIX) 40 MG tablet, TAKE 1 TABLET BY MOUTH DAILY., Disp: 30 tablet, Rfl: 3  •  gabapentin (NEURONTIN) 800 MG tablet, Take 1 tablet by mouth 3 (Three) Times a Day. For neuropathy, Disp: 90 tablet, Rfl: 3  •  glucose blood test strip, Use as instructed, Disp: 100 each, Rfl: " 12  •  ibuprofen (ADVIL,MOTRIN) 800 MG tablet, Take 1 tablet by mouth Every 6 (Six) Hours As Needed for Mild Pain ., Disp: 20 tablet, Rfl: 0  •  Insulin Lispro (HUMALOG KWIKPEN) 100 UNIT/ML solution pen-injector, Inject 45 Units under the skin 3 (Three) Times a Day Before Meals., Disp: 60 mL, Rfl: 11  •  Insulin Pen Needle (NOVOFINE) 30G X 8 MM misc, As directed 4 times daily, Disp: 100 each, Rfl: 11  •  ipratropium-albuterol (DUO-NEB) 0.5-2.5 mg/3 ml nebulizer, Take 3 mL by nebulization 4 (Four) Times a Day. ICD10 code J96.01, Disp: 360 mL, Rfl: 0  •  LANTUS SOLOSTAR 100 UNIT/ML injection pen, Inject 95 Units under the skin into the appropriate area as directed Every 12 (Twelve) Hours., Disp: 3 mL, Rfl: 11  •  lidocaine (LIDODERM) 5 %, Place 1 patch on the skin as directed by provider Daily. Remove & Discard patch within 12 hours or as directed by MD, Disp: 15 each, Rfl: 0  •  lidocaine (XYLOCAINE) 5 % ointment, Apply  topically to the appropriate area as directed Every 2 (Two) Hours As Needed for Mild Pain ., Disp: 1 tube, Rfl: 0  •  Liraglutide (VICTOZA) 18 MG/3ML solution pen-injector injection, Inject 1.2 mg under the skin into the appropriate area as directed Every Morning., Disp: 9 mL, Rfl: 3  •  lisinopril (PRINIVIL,ZESTRIL) 10 MG tablet, Take 1 tablet by mouth Daily., Disp: 30 tablet, Rfl: 3  •  meloxicam (MOBIC) 7.5 MG tablet, Take 1 tablet by mouth Daily. Start medication on 8/24/18, Disp: 30 tablet, Rfl: 1  •  metFORMIN ER (GLUCOPHAGE-XR) 500 MG 24 hr tablet, TAKE 2 TABLET BY MOUTH 2 (TWO) TIMES A DAY WITH MEALS., Disp: 120 tablet, Rfl: 6  •  methocarbamol (ROBAXIN) 750 MG tablet, Take 1 tablet by mouth 4 (Four) Times a Day As Needed for Muscle Spasms., Disp: 30 tablet, Rfl: 1  •  metoprolol tartrate (LOPRESSOR) 50 MG tablet, Take 1 tablet by mouth Every 12 (Twelve) Hours., Disp: 60 tablet, Rfl: 3  •  montelukast (SINGULAIR) 10 MG tablet, Take 1 tablet by mouth Every Night., Disp: 30 tablet, Rfl: 3  •   nitroglycerin (NITROSTAT) 0.4 MG SL tablet, Place 1 tablet under the tongue As Needed for Chest Pain. Take no more than 3 doses in 15 minutes., Disp: 30 tablet, Rfl: 3  •  nystatin (MYCOSTATIN) 090004 UNIT/GM powder, Apply  topically to the appropriate area as directed 3 (Three) Times a Day., Disp: 15 g, Rfl: 1  •  pantoprazole (PROTONIX) 40 MG EC tablet, TAKE 1 TABLET BY MOUTH DAILY., Disp: 30 tablet, Rfl: 6  •  potassium chloride (K-DUR) 10 MEQ CR tablet, Take 1 tablet by mouth Every Night., Disp: 30 tablet, Rfl: 3  •  promethazine (PHENERGAN) 25 MG tablet, Take 1 tablet by mouth Every 6 (Six) Hours As Needed for Nausea or Vomiting., Disp: 30 tablet, Rfl: 0  •  valACYclovir (VALTREX) 1000 MG tablet, Take 1 tablet by mouth 3 (Three) Times a Day., Disp: 21 tablet, Rfl: 0  Allergies   Allergen Reactions   • Other      Bandaids, MRSA, SURECLOSE       Physical Exam   Constitutional: She appears well-developed and well-nourished.   HENT:   Head: Normocephalic and atraumatic.   Eyes: EOM are normal.   Neck: Normal range of motion. Neck supple. Carotid bruit is not present.   Cardiovascular: Normal rate and regular rhythm. Exam reveals no gallop and no friction rub.   No murmur heard.      Assessment:     1. Pain in both hands       Plan:I have seen this patient and discussed the case with resident and agree with the assessment and plan.  ROSELIA Liz M.D.

## 2019-01-30 ENCOUNTER — TELEPHONE (OUTPATIENT)
Dept: FAMILY MEDICINE CLINIC | Facility: CLINIC | Age: 59
End: 2019-01-30

## 2019-01-30 NOTE — TELEPHONE ENCOUNTER
Dr Us    Patient's form for diabetic shoes was not sent in to [facility].  Please fill out and sign and fax in as soon as possible so that she can get her shoes for last year.    Thank you

## 2019-02-04 DIAGNOSIS — IMO0002 UNCONTROLLED TYPE 2 DIABETES MELLITUS WITH COMPLICATION, WITH LONG-TERM CURRENT USE OF INSULIN: ICD-10-CM

## 2019-02-04 RX ORDER — METOPROLOL TARTRATE 50 MG/1
50 TABLET, FILM COATED ORAL EVERY 12 HOURS SCHEDULED
Qty: 60 TABLET | Refills: 3 | Status: CANCELLED | OUTPATIENT
Start: 2019-02-04

## 2019-02-04 NOTE — TELEPHONE ENCOUNTER
Requested Medications      Name from pharmacy: METFORMIN HCL ER 1,000 MG T 1000 TAB         Will file in chart as: metFORMIN (GLUMETZA) 1000 MG (MOD) 24 hr tablet    The source prescription was discontinued on 2/2/2018 by Yadira Delarosa MD for the following reason: Reorder.    Sig: Take 1 tablet by mouth 2 (Two) Times a Day With Meals.    Disp:  60 tablet    Refills:  3    Start: 2/4/2019    Class: Normal    For: Uncontrolled type 2 diabetes mellitus with complication, with long-term current use of insulin (CMS/[facility])    Requested on: 7/10/2017    Last ordered: 1 year ago by Yadira Delarosa MD Last refill: 2/4/2019    Rx #: [id number]       To be filled at: [facility] - [place], [place] - [address] [phone] [facility] [phone] FX

## 2019-02-06 ENCOUNTER — OFFICE VISIT (OUTPATIENT)
Dept: FAMILY MEDICINE CLINIC | Facility: CLINIC | Age: 59
End: 2019-02-06

## 2019-02-06 VITALS
WEIGHT: 293 LBS | HEIGHT: 62 IN | SYSTOLIC BLOOD PRESSURE: 140 MMHG | HEART RATE: 85 BPM | DIASTOLIC BLOOD PRESSURE: 84 MMHG | BODY MASS INDEX: 53.92 KG/M2 | OXYGEN SATURATION: 97 %

## 2019-02-06 DIAGNOSIS — J45.30 MILD PERSISTENT ASTHMA WITHOUT COMPLICATION: ICD-10-CM

## 2019-02-06 DIAGNOSIS — J44.9 CHRONIC OBSTRUCTIVE PULMONARY DISEASE, UNSPECIFIED COPD TYPE (HCC): ICD-10-CM

## 2019-02-06 DIAGNOSIS — R53.81 PHYSICAL DECONDITIONING: ICD-10-CM

## 2019-02-06 DIAGNOSIS — H69.81 EUSTACHIAN TUBE DYSFUNCTION, RIGHT: ICD-10-CM

## 2019-02-06 DIAGNOSIS — R68.89 FLU-LIKE SYMPTOMS: ICD-10-CM

## 2019-02-06 DIAGNOSIS — IMO0002 UNCONTROLLED TYPE 2 DIABETES MELLITUS WITH COMPLICATION, WITH LONG-TERM CURRENT USE OF INSULIN: ICD-10-CM

## 2019-02-06 DIAGNOSIS — IMO0002 UNCONTROLLED TYPE 2 DIABETES MELLITUS WITH DIABETIC POLYNEUROPATHY, WITH LONG-TERM CURRENT USE OF INSULIN: Primary | ICD-10-CM

## 2019-02-06 DIAGNOSIS — J44.0 CHRONIC OBSTRUCTIVE PULMONARY DISEASE WITH ACUTE LOWER RESPIRATORY INFECTION (HCC): ICD-10-CM

## 2019-02-06 LAB
EXPIRATION DATE: NORMAL
FLUAV AG NPH QL: NEGATIVE
FLUBV AG NPH QL: NEGATIVE
INTERNAL CONTROL: NORMAL
Lab: NORMAL

## 2019-02-06 PROCEDURE — 87804 INFLUENZA ASSAY W/OPTIC: CPT | Performed by: STUDENT IN AN ORGANIZED HEALTH CARE EDUCATION/TRAINING PROGRAM

## 2019-02-06 PROCEDURE — 99213 OFFICE O/P EST LOW 20 MIN: CPT | Performed by: STUDENT IN AN ORGANIZED HEALTH CARE EDUCATION/TRAINING PROGRAM

## 2019-02-06 RX ORDER — POTASSIUM CHLORIDE 750 MG/1
10 TABLET, FILM COATED, EXTENDED RELEASE ORAL NIGHTLY
Qty: 30 TABLET | Refills: 3 | Status: SHIPPED | OUTPATIENT
Start: 2019-02-06 | End: 2019-08-08 | Stop reason: SDUPTHER

## 2019-02-06 RX ORDER — LIDOCAINE 50 MG/G
1 PATCH TOPICAL
Qty: 15 EACH | Refills: 0 | Status: SHIPPED | OUTPATIENT
Start: 2019-02-06 | End: 2019-05-03 | Stop reason: SDUPTHER

## 2019-02-06 RX ORDER — METOPROLOL TARTRATE 50 MG/1
50 TABLET, FILM COATED ORAL EVERY 12 HOURS SCHEDULED
Qty: 60 TABLET | Refills: 3 | Status: SHIPPED | OUTPATIENT
Start: 2019-02-06 | End: 2019-05-03 | Stop reason: SDUPTHER

## 2019-02-06 RX ORDER — ALBUTEROL SULFATE 0.63 MG/3ML
1 SOLUTION RESPIRATORY (INHALATION) EVERY 6 HOURS PRN
Qty: 20 VIAL | Refills: 0 | Status: SHIPPED | OUTPATIENT
Start: 2019-02-06 | End: 2019-05-03 | Stop reason: SDUPTHER

## 2019-02-06 RX ORDER — LIDOCAINE 50 MG/G
OINTMENT TOPICAL
Qty: 1 TUBE | Refills: 0 | Status: SHIPPED | OUTPATIENT
Start: 2019-02-06 | End: 2019-05-03 | Stop reason: SDUPTHER

## 2019-02-06 RX ORDER — ALBUTEROL SULFATE 90 UG/1
2 AEROSOL, METERED RESPIRATORY (INHALATION) EVERY 4 HOURS PRN
Qty: 1 INHALER | Refills: 0 | Status: SHIPPED | OUTPATIENT
Start: 2019-02-06 | End: 2019-05-03 | Stop reason: SDUPTHER

## 2019-02-06 RX ORDER — NYSTATIN 100000 [USP'U]/G
POWDER TOPICAL 3 TIMES DAILY
Qty: 15 G | Refills: 1 | Status: SHIPPED | OUTPATIENT
Start: 2019-02-06 | End: 2020-08-05 | Stop reason: SDUPTHER

## 2019-02-06 RX ORDER — GABAPENTIN 800 MG/1
800 TABLET ORAL 3 TIMES DAILY
Qty: 90 TABLET | Refills: 1 | Status: SHIPPED | OUTPATIENT
Start: 2019-02-06 | End: 2019-05-03 | Stop reason: SDUPTHER

## 2019-02-06 RX ORDER — BUDESONIDE AND FORMOTEROL FUMARATE DIHYDRATE 160; 4.5 UG/1; UG/1
2 AEROSOL RESPIRATORY (INHALATION)
Qty: 10.2 G | Refills: 6 | Status: SHIPPED | OUTPATIENT
Start: 2019-02-06 | End: 2019-05-03 | Stop reason: SDUPTHER

## 2019-02-06 RX ORDER — PROMETHAZINE HYDROCHLORIDE 25 MG/1
25 TABLET ORAL EVERY 6 HOURS PRN
Qty: 30 TABLET | Refills: 0 | Status: SHIPPED | OUTPATIENT
Start: 2019-02-06 | End: 2019-05-03 | Stop reason: SDUPTHER

## 2019-02-06 RX ORDER — CYCLOBENZAPRINE HCL 10 MG
10 TABLET ORAL 2 TIMES DAILY PRN
Qty: 60 TABLET | Refills: 2 | Status: SHIPPED | OUTPATIENT
Start: 2019-02-06 | End: 2019-05-03 | Stop reason: SDUPTHER

## 2019-02-06 RX ORDER — INSULIN GLARGINE 100 [IU]/ML
95 INJECTION, SOLUTION SUBCUTANEOUS EVERY 12 HOURS
Qty: 3 ML | Refills: 11 | Status: SHIPPED | OUTPATIENT
Start: 2019-02-06 | End: 2019-05-03 | Stop reason: SDUPTHER

## 2019-02-06 RX ORDER — IPRATROPIUM BROMIDE AND ALBUTEROL SULFATE 2.5; .5 MG/3ML; MG/3ML
3 SOLUTION RESPIRATORY (INHALATION) 4 TIMES DAILY
Qty: 360 ML | Refills: 0 | Status: SHIPPED | OUTPATIENT
Start: 2019-02-06 | End: 2020-08-05 | Stop reason: SDUPTHER

## 2019-02-06 RX ORDER — NAPROXEN 500 MG/1
500 TABLET ORAL 2 TIMES DAILY WITH MEALS
COMMUNITY
End: 2019-03-16 | Stop reason: ALTCHOICE

## 2019-02-06 NOTE — PROGRESS NOTES
"                    Subjective:      Yamileth Slater is a 58 y.o. female who presents for an initial evaluation of Type 2 diabetes mellitus, chest burning on exertion, and right ear pain.     Type 2 DM  She woke up this morning feeling drunk and her face feeling swollen.  She felt very tired and very achy.  When she checked her blood sugar it was 53 so had some peanut butter and went up to 109.  The feelings of tiredness resolved but she still felt worn out.    Current symptoms/problems include paresthesia of the feet and have been stable. Symptoms have been present for years    Known diabetic complications: peripheral neuropathy  Cardiovascular risk factors: diabetes mellitus, dyslipidemia, hypertension, obesity (BMI >= 30 kg/m2), sedentary lifestyle and smoking/ tobacco exposure  Current diabetic medications include Jardiance, Humalog, Lantus, Victoza, and Glucophage.    Eye exam current (within one year): yes  Weight trend: stable  Prior visit with dietician: no  Current diet: in general, an \"unhealthy\" diet  Current exercise: none    Current monitoring regimen: home blood tests - 4-7 times daily  Home blood sugar records: fasting range: 100-120 and postprandial range: 140-160  Any episodes of hypoglycemia? yes - 54    Is She on ACE inhibitor or angiotensin II receptor blocker?   Yes   Lisinopril    Chest burning on exertion  The chest burning only occurs when she walks about 30 feet.  It is in the center of her chest, feels like her chest is on fire, and is non-radiating.   She is usually sedentary and does not walk this distance.  It is associated with shortness of breath and pain behind her legs.  The chest burning, shortness of breath, and pain behind her legs is relieved with rest.    Right ear pain  She has been having right ear pain for the last several days.  It is a constant achy pain that is non-radiating, and does not affect her hearing.  She does not note any exacerbating or relieving " factors.    Past Medical Hx:  Past Medical History:   Diagnosis Date   • Anxiety states    • Asthma    • Carotid artery stenosis     DWIGHT 0-15%, LICA 0-15%. LICA stent 5/2014.   • Chronic folliculitis    • Chronic obstructive lung disease (CMS/[facility])    • Coronary arteriosclerosis    • Diabetes mellitus (CMS/[facility])     no retinopathy; A1C 9.1      • Dyslipidemia    • Essential hypertension    • Excoriated eczema     asteosis/keratosis   • GERD (gastroesophageal reflux disease)    • History of colon polyps    • Hypertensive disorder    • Kidney stone    • Mixed hyperlipidemia    • Morbid obesity due to excess calories (CMS/[facility])     BMI 44.5   • Neurologic disorder associated with diabetes mellitus (CMS/[facility])    • Non-healing surgical wound     LLE EVHa   • Peripheral vascular disease (CMS/[facility])    • Sleep apnea    • Tobacco dependence syndrome     1/2ppd      • Type 2 diabetes mellitus (CMS/[facility])    • Vitamin D deficiency        Past Surgical Hx:  Past Surgical History:   Procedure Laterality Date   • CARDIAC CATHETERIZATION  08/09/2013    Severe multivessel CAD with critical lesions noted in the RCA, obtuse marginal two, ramus intermemdious, and LAD as described above. Normal LV systolic function with no wall motion abnormality.    • CARDIAC CATHETERIZATION  05/27/2014     LEFT Carotid Stent    • CAROTID STENT Left 05/27/2014   • COLONOSCOPY  05/02/2016    Normal colon.Diverticulosis in the sigmoid colon.No specimens collected.    • CORONARY ARTERY BYPASS GRAFT  11/15/2013    CABG x 3 with LIMA to LAD and coronary endarterectomy to LAD, SVG to Ramus intermedius branch and SVG to PDA.    • CYSTOSCOPY URETEROSCOPY LASER LITHOTRIPSY     • ENDOSCOPY  09/23/2015     Esophageal stricture present.Normal stomach.Normal duodenum.    • ENDOSCOPY  11/16/2015    w/ dilatation - Esophageal stricture present,Dilatation performed.Normal stomach and duodenum.    • HYSTERECTOMY     • INCISION AND DRAINAGE ABSCESS  01/02/2016    Incision  and drainage of right perineal abscess.    • INCISION AND DRAINAGE ABSCESS  02/18/2014    Incision and Drainage of abscess of left lower leg    • OTHER SURGICAL HISTORY  01/25/2016    DESTRUCTION OF BENIGN LESION      • OTHER SURGICAL HISTORY  04/18/2014    ear/neck - L attempted CEA, Neck Dissection    • TUBAL ABDOMINAL LIGATION         Health Maintenance:  Health Maintenance   Topic Date Due   • MEDICARE ANNUAL WELLNESS  08/24/2016   • LUNG CANCER SCREENING  03/14/2017   • URINE MICROALBUMIN  10/05/2018   • MAMMOGRAM  01/04/2019   • DIABETIC EYE EXAM  02/14/2019 (Originally 8/24/2016)   • ZOSTER VACCINE (1 of 2) 12/27/2019 (Originally 3/10/2009)   • HEMOGLOBIN A1C  03/10/2019   • LIPID PANEL  04/04/2019   • DIABETIC FOOT EXAM  12/26/2019   • PAP SMEAR  01/04/2020   • COLONOSCOPY  05/02/2021   • TDAP/TD VACCINES (2 - Td) 11/10/2024   • PNEUMOCOCCAL VACCINE (19-64 MEDIUM RISK)  Completed   • HEPATITIS C SCREENING  Completed   • INFLUENZA VACCINE  Completed       Current Meds:    Current Outpatient Medications:   •  albuterol (ACCUNEB) 0.63 MG/3ML nebulizer solution, Take 3 mL by nebulization Every 6 (Six) Hours As Needed for Wheezing., Disp: 20 vial, Rfl: 0  •  albuterol sulfate  (90 Base) MCG/ACT inhaler, Inhale 2 puffs Every 4 (Four) Hours As Needed for Wheezing., Disp: 1 inhaler, Rfl: 0  •  atorvastatin (LIPITOR) 20 MG tablet, Take 1 tablet by mouth Daily., Disp: 30 tablet, Rfl: 3  •  betamethasone valerate (VALISONE) 0.1 % cream, Apply  topically to the appropriate area as directed Daily., Disp: 45 g, Rfl: 2  •  Blood Glucose Monitoring Suppl ([employer] BLOOD GLUCOSE METER) W/DEVICE kit, 1 each 3 (Three) Times a Day., Disp: 1 kit, Rfl: 3  •  budesonide-formoterol (SYMBICORT) 160-4.5 MCG/ACT inhaler, Inhale 2 puffs 2 (Two) Times a Day., Disp: 10.2 g, Rfl: 6  •  cetirizine (zyrTEC) 10 MG tablet, Take 1 tablet by mouth Daily., Disp: 30 tablet, Rfl: 3  •  clopidogrel (PLAVIX) 75 MG tablet, Take 1 tablet by mouth  Daily., Disp: 30 tablet, Rfl: 3  •  cyclobenzaprine (FLEXERIL) 10 MG tablet, Take 1 tablet by mouth 2 (Two) Times a Day As Needed for Muscle Spasms., Disp: 60 tablet, Rfl: 2  •  Empagliflozin (JARDIANCE) 10 MG tablet, Take 10 mg by mouth Every Morning., Disp: 90 tablet, Rfl: 3  •  ferrous sulfate (FERROUSUL) 325 (65 FE) MG tablet, Take 1 tablet by mouth Daily With Breakfast., Disp: 30 tablet, Rfl: 3  •  furosemide (LASIX) 40 MG tablet, TAKE 1 TABLET BY MOUTH DAILY., Disp: 30 tablet, Rfl: 3  •  gabapentin (NEURONTIN) 800 MG tablet, Take 1 tablet by mouth 3 (Three) Times a Day. For neuropathy, Disp: 90 tablet, Rfl: 1  •  glucose blood test strip, Use as instructed, Disp: 100 each, Rfl: 12  •  ibuprofen (ADVIL,MOTRIN) 800 MG tablet, Take 1 tablet by mouth Every 6 (Six) Hours As Needed for Mild Pain ., Disp: 20 tablet, Rfl: 0  •  Insulin Lispro (HUMALOG KWIKPEN) 100 UNIT/ML solution pen-injector, Inject 45 Units under the skin 3 (Three) Times a Day Before Meals., Disp: 60 mL, Rfl: 11  •  Insulin Pen Needle (NOVOFINE) 30G X 8 MM misc, As directed 4 times daily, Disp: 100 each, Rfl: 11  •  ipratropium-albuterol (DUO-NEB) 0.5-2.5 mg/3 ml nebulizer, Take 3 mL by nebulization 4 (Four) Times a Day. ICD10 code J96.01, Disp: 360 mL, Rfl: 0  •  LANTUS SOLOSTAR 100 UNIT/ML injection pen, Inject 95 Units under the skin into the appropriate area as directed Every 12 (Twelve) Hours., Disp: 3 mL, Rfl: 11  •  lidocaine (LIDODERM) 5 %, Place 1 patch on the skin as directed by provider Daily. Remove & Discard patch within 12 hours or as directed by MD, Disp: 15 each, Rfl: 0  •  lidocaine (XYLOCAINE) 5 % ointment, Apply  topically to the appropriate area as directed Every 2 (Two) Hours As Needed for Mild Pain ., Disp: 1 tube, Rfl: 0  •  Liraglutide (VICTOZA) 18 MG/3ML solution pen-injector injection, Inject 1.2 mg under the skin into the appropriate area as directed Every Morning., Disp: 9 mL, Rfl: 3  •  lisinopril (PRINIVIL,ZESTRIL) 10  MG tablet, Take 1 tablet by mouth Daily., Disp: 30 tablet, Rfl: 3  •  meloxicam (MOBIC) 7.5 MG tablet, Take 1 tablet by mouth Daily. Start medication on 8/24/18, Disp: 30 tablet, Rfl: 1  •  metFORMIN ER (GLUCOPHAGE-XR) 500 MG 24 hr tablet, TAKE 2 TABLET BY MOUTH 2 (TWO) TIMES A DAY WITH MEALS., Disp: 120 tablet, Rfl: 6  •  methocarbamol (ROBAXIN) 750 MG tablet, Take 1 tablet by mouth 4 (Four) Times a Day As Needed for Muscle Spasms., Disp: 30 tablet, Rfl: 1  •  metoprolol tartrate (LOPRESSOR) 50 MG tablet, Take 1 tablet by mouth Every 12 (Twelve) Hours., Disp: 60 tablet, Rfl: 3  •  montelukast (SINGULAIR) 10 MG tablet, Take 1 tablet by mouth Every Night., Disp: 30 tablet, Rfl: 3  •  naproxen (NAPROSYN) 500 MG tablet, Take 500 mg by mouth 2 (Two) Times a Day With Meals., Disp: , Rfl:   •  nitroglycerin (NITROSTAT) 0.4 MG SL tablet, Place 1 tablet under the tongue As Needed for Chest Pain. Take no more than 3 doses in 15 minutes., Disp: 30 tablet, Rfl: 3  •  nystatin (MYCOSTATIN) 610926 UNIT/GM powder, Apply  topically to the appropriate area as directed 3 (Three) Times a Day., Disp: 15 g, Rfl: 1  •  pantoprazole (PROTONIX) 40 MG EC tablet, TAKE 1 TABLET BY MOUTH DAILY., Disp: 30 tablet, Rfl: 6  •  potassium chloride (K-DUR) 10 MEQ CR tablet, Take 1 tablet by mouth Every Night., Disp: 30 tablet, Rfl: 3  •  promethazine (PHENERGAN) 25 MG tablet, Take 1 tablet by mouth Every 6 (Six) Hours As Needed for Nausea or Vomiting., Disp: 30 tablet, Rfl: 0  •  valACYclovir (VALTREX) 1000 MG tablet, Take 1 tablet by mouth 3 (Three) Times a Day., Disp: 21 tablet, Rfl: 0    Allergies:  Other    Family Hx:  Family History   Problem Relation Age of Onset   • Heart disease Mother    • Cancer Mother    • Heart disease Father    • Heart disease Sister    • Cancer Sister    • Heart disease Brother         Social History:  Social History     Socioeconomic History   • Marital status:      Spouse name: wesley dumont   • Number of  "children: Not on file   • Years of education: Not on file   • Highest education level: Not on file   Social Needs   • Financial resource strain: Not on file   • Food insecurity - worry: Not on file   • Food insecurity - inability: Not on file   • Transportation needs - medical: Not on file   • Transportation needs - non-medical: Not on file   Occupational History   • Not on file   Tobacco Use   • Smoking status: Current Every Day Smoker     Packs/day: 1.00     Years: 40.00     Pack years: 40.00     Types: Cigarettes   • Smokeless tobacco: Never Used   Substance and Sexual Activity   • Alcohol use: No   • Drug use: Yes     Types: LSD, Marijuana, Methamphetamines     Comment: Lasted used in 2006   • Sexual activity: Not Currently     Partners: Male   Other Topics Concern   • Not on file   Social History Narrative   • Not on file       Review of Systems  Review of Systems   Constitutional: Negative for activity change, appetite change, diaphoresis and fever.   HENT: Positive for ear pain and facial swelling. Negative for congestion, hearing loss, rhinorrhea, sinus pressure, sinus pain and trouble swallowing.    Respiratory: Positive for cough. Negative for chest tightness and shortness of breath.    Cardiovascular: Negative for chest pain and palpitations.   Gastrointestinal: Negative for abdominal distention and abdominal pain.   Genitourinary: Negative for difficulty urinating and dysuria.   Musculoskeletal: Negative for arthralgias and myalgias.   Skin: Negative for color change and pallor.   Neurological: Negative for dizziness, weakness, light-headedness and headaches.   Psychiatric/Behavioral: Negative for agitation and behavioral problems.            Objective:     /84   Pulse 85   Ht 157.5 cm (62\")   Wt 135 kg (297 lb 11.2 oz)   LMP  (LMP Unknown)   SpO2 97%   Breastfeeding? No   BMI 54.45 kg/m²       Physical Exam   Constitutional: She is oriented to person, place, and time. She appears " well-developed and well-nourished. She appears ill. No distress.   HENT:   Head: Normocephalic and atraumatic.   Right Ear: Tympanic membrane and external ear normal.   Left Ear: Tympanic membrane and external ear normal.   Eyes: EOM are normal. Pupils are equal, round, and reactive to light.   Neck: Normal range of motion. Neck supple.   Cardiovascular: Normal rate, regular rhythm and normal heart sounds. Exam reveals no gallop and no friction rub.   No murmur heard.  Pulmonary/Chest: Effort normal and breath sounds normal. No respiratory distress. She has no wheezes. She has no rales.   Abdominal: Soft. Bowel sounds are normal. She exhibits no distension. There is no tenderness.   Musculoskeletal: Normal range of motion. She exhibits no edema.   Neurological: She is alert and oriented to person, place, and time.   Skin: Skin is warm. She is not diaphoretic.   Psychiatric: She has a normal mood and affect. Her behavior is normal.       Assessment/Plan:     1. Uncontrolled type 2 diabetes mellitus with complication, with long-term current use of insulin (CMS/[facility])   - Her diabetes maintenance has been improving and she has been able to maintain good control of her blood glucose.  Due to the adequate control she will be continued on her current medication regimen.  -Her gabapentin will be refilled.     2. Physical deconditioning   - Her chest burning, and posterior leg pain with exertion is thought to be due to deconditioning and she was advised to perform daily exercise to improve her stamina.     3. Eustachian tube dysfunction, right   -Since she is having feelings of tiredness and weakness with cough and multiple sick contacts her right ear pain is thought to be due to eustachian tube dysfunction.  She was advised to attempt equalizing her ears by holding her nose and blowing gently to try and open up her eustachian tubes.     4. Flu like symptoms  -She had been feeling tired and weak with cough multiple sick  contacts so an influenza swab was performed which was negative.          Follow-up:     Return in about 2 months (around 4/6/2019).    Goals     • Fasting Blood Glucose       Barriers: compliance with diet      • Quit smoking / using tobacco           Preventative:    Vaccines Recommended at this visit:   No Vaccines recommended today. Patient is up to date on all vaccines.     Vaccines Received at this visit:  No Vaccines recommended today. Patient is up to date on all vaccines.     Screenings Recommended at this visit:  Urine Microalbumin     Screenings Ordered at this visit:  Urine microalbumin    Smoking Status:  Patient is current smoker. Patient is not interested in smoking cessation.    Alcohol Intake:  Patient does not drink    Patient's Body mass index is 54.45 kg/m². BMI is above normal parameters. Recommendations include: exercise counseling and nutrition counseling.       RISK SCORE: 4          This document has been electronically signed by Nirmal Calvillo MD on February 6, 2019 6:16 PM

## 2019-02-07 RX ORDER — METFORMIN HYDROCHLORIDE 1000 MG/1
TABLET, FILM COATED, EXTENDED RELEASE ORAL
Qty: 60 TABLET | Refills: 0 | Status: SHIPPED | OUTPATIENT
Start: 2019-02-07 | End: 2019-03-28 | Stop reason: SDUPTHER

## 2019-02-07 RX ORDER — METFORMIN HYDROCHLORIDE 1000 MG/1
1000 TABLET, FILM COATED, EXTENDED RELEASE ORAL 2 TIMES DAILY WITH MEALS
Qty: 60 TABLET | Refills: 3 | OUTPATIENT
Start: 2019-02-07

## 2019-02-08 DIAGNOSIS — IMO0002 UNCONTROLLED TYPE 2 DIABETES MELLITUS WITH COMPLICATION, WITH LONG-TERM CURRENT USE OF INSULIN: ICD-10-CM

## 2019-02-08 NOTE — TELEPHONE ENCOUNTER
Requested Medications      Name from pharmacy: METFORMIN HCL ER 1,000 MG T 1000 TAB         Will file in chart as: metFORMIN (GLUMETZA) 1000 MG (MOD) 24 hr tablet    The source prescription was discontinued on 2/2/2018 by Yadira Delarosa MD for the following reason: Reorder.    Sig: Take 1 tablet by mouth 2 (Two) Times a Day With Meals.    Disp:  60 tablet    Refills:  3    Start: 2/8/2019    Class: Normal    For: Uncontrolled type 2 diabetes mellitus with complication, with long-term current use of insulin (CMS/[facility])    Requested on: 7/10/2017    Last ordered: 1 year ago by Yadira Delarosa MD Last refill: 2/8/2019    Rx #: [id number]       To be filled at: [facility] - [place], [place] - [address] [facility] [phone] [facility] [phone] FX

## 2019-02-11 NOTE — PROGRESS NOTES
Subjective:     Yamileth Slater is a 58 y.o. female who presents for follow up of diabetes with hypoglycemis, facial burning, ear ache, and chest pain.    The following portions of the patient's history were reviewed and updated as appropriate: allergies, current medications, past family history, past medical history, past social history, past surgical history and problem list.      Past Medical Hx:  Past Medical History:   Diagnosis Date   • Anxiety states    • Asthma    • Carotid artery stenosis     DWIGHT 0-15%, LICA 0-15%. LICA stent 5/2014.   • Chronic folliculitis    • Chronic obstructive lung disease (CMS/HCC)    • Coronary arteriosclerosis    • Diabetes mellitus (CMS/[facility])     no retinopathy; A1C 9.1      • Dyslipidemia    • Essential hypertension    • Excoriated eczema     asteosis/keratosis   • GERD (gastroesophageal reflux disease)    • History of colon polyps    • Hypertensive disorder    • Kidney stone    • Mixed hyperlipidemia    • Morbid obesity due to excess calories (CMS/[facility])     BMI 44.5   • Neurologic disorder associated with diabetes mellitus (CMS/[facility])    • Non-healing surgical wound     LLE EVHa   • Peripheral vascular disease (CMS/[facility])    • Sleep apnea    • Tobacco dependence syndrome     1/2ppd      • Type 2 diabetes mellitus (CMS/[facility])    • Vitamin D deficiency        Past Surgical Hx:  Past Surgical History:   Procedure Laterality Date   • CARDIAC CATHETERIZATION  08/09/2013    Severe multivessel CAD with critical lesions noted in the RCA, obtuse marginal two, ramus intermemdious, and LAD as described above. Normal LV systolic function with no wall motion abnormality.    • CARDIAC CATHETERIZATION  05/27/2014     LEFT Carotid Stent    • CAROTID STENT Left 05/27/2014   • COLONOSCOPY  05/02/2016    Normal colon.Diverticulosis in the sigmoid colon.No specimens collected.    • CORONARY ARTERY BYPASS GRAFT  11/15/2013    CABG x 3 with LIMA to LAD and coronary endarterectomy to LAD, SVG to Ramus  intermedius branch and SVG to PDA.    • CYSTOSCOPY URETEROSCOPY LASER LITHOTRIPSY     • ENDOSCOPY  09/23/2015     Esophageal stricture present.Normal stomach.Normal duodenum.    • ENDOSCOPY  11/16/2015    w/ dilatation - Esophageal stricture present,Dilatation performed.Normal stomach and duodenum.    • HYSTERECTOMY     • INCISION AND DRAINAGE ABSCESS  01/02/2016    Incision and drainage of right perineal abscess.    • INCISION AND DRAINAGE ABSCESS  02/18/2014    Incision and Drainage of abscess of left lower leg    • OTHER SURGICAL HISTORY  01/25/2016    DESTRUCTION OF BENIGN LESION      • OTHER SURGICAL HISTORY  04/18/2014    ear/neck - L attempted CEA, Neck Dissection    • TUBAL ABDOMINAL LIGATION         Health Maintenance:  Health Maintenance   Topic Date Due   • MEDICARE ANNUAL WELLNESS  08/24/2016   • LUNG CANCER SCREENING  03/14/2017   • URINE MICROALBUMIN  10/05/2018   • MAMMOGRAM  01/04/2019   • DIABETIC EYE EXAM  02/14/2019 (Originally 8/24/2016)   • ZOSTER VACCINE (1 of 2) 12/27/2019 (Originally 3/10/2009)   • HEMOGLOBIN A1C  03/10/2019   • LIPID PANEL  04/04/2019   • DIABETIC FOOT EXAM  12/26/2019   • PAP SMEAR  01/04/2020   • COLONOSCOPY  05/02/2021   • TDAP/TD VACCINES (2 - Td) 11/10/2024   • PNEUMOCOCCAL VACCINE (19-64 MEDIUM RISK)  Completed   • HEPATITIS C SCREENING  Completed   • INFLUENZA VACCINE  Completed       Current Meds:    Current Outpatient Medications:   •  albuterol (ACCUNEB) 0.63 MG/3ML nebulizer solution, Take 3 mL by nebulization Every 6 (Six) Hours As Needed for Wheezing., Disp: 20 vial, Rfl: 0  •  albuterol sulfate  (90 Base) MCG/ACT inhaler, Inhale 2 puffs Every 4 (Four) Hours As Needed for Wheezing., Disp: 1 inhaler, Rfl: 0  •  atorvastatin (LIPITOR) 20 MG tablet, Take 1 tablet by mouth Daily., Disp: 30 tablet, Rfl: 3  •  betamethasone valerate (VALISONE) 0.1 % cream, Apply  topically to the appropriate area as directed Daily., Disp: 45 g, Rfl: 2  •  Blood Glucose  Monitoring Suppl ([employer] BLOOD GLUCOSE METER) W/DEVICE kit, 1 each 3 (Three) Times a Day., Disp: 1 kit, Rfl: 3  •  budesonide-formoterol (SYMBICORT) 160-4.5 MCG/ACT inhaler, Inhale 2 puffs 2 (Two) Times a Day., Disp: 10.2 g, Rfl: 6  •  cetirizine (zyrTEC) 10 MG tablet, Take 1 tablet by mouth Daily., Disp: 30 tablet, Rfl: 3  •  clopidogrel (PLAVIX) 75 MG tablet, Take 1 tablet by mouth Daily., Disp: 30 tablet, Rfl: 3  •  cyclobenzaprine (FLEXERIL) 10 MG tablet, Take 1 tablet by mouth 2 (Two) Times a Day As Needed for Muscle Spasms., Disp: 60 tablet, Rfl: 2  •  Empagliflozin (JARDIANCE) 10 MG tablet, Take 10 mg by mouth Every Morning., Disp: 90 tablet, Rfl: 3  •  ferrous sulfate (FERROUSUL) 325 (65 FE) MG tablet, Take 1 tablet by mouth Daily With Breakfast., Disp: 30 tablet, Rfl: 3  •  furosemide (LASIX) 40 MG tablet, TAKE 1 TABLET BY MOUTH DAILY., Disp: 30 tablet, Rfl: 3  •  gabapentin (NEURONTIN) 800 MG tablet, Take 1 tablet by mouth 3 (Three) Times a Day. For neuropathy, Disp: 90 tablet, Rfl: 1  •  glucose blood test strip, Use as instructed, Disp: 100 each, Rfl: 12  •  ibuprofen (ADVIL,MOTRIN) 800 MG tablet, Take 1 tablet by mouth Every 6 (Six) Hours As Needed for Mild Pain ., Disp: 20 tablet, Rfl: 0  •  Insulin Lispro (HUMALOG KWIKPEN) 100 UNIT/ML solution pen-injector, Inject 45 Units under the skin 3 (Three) Times a Day Before Meals., Disp: 60 mL, Rfl: 11  •  Insulin Pen Needle (NOVOFINE) 30G X 8 MM misc, As directed 4 times daily, Disp: 100 each, Rfl: 11  •  ipratropium-albuterol (DUO-NEB) 0.5-2.5 mg/3 ml nebulizer, Take 3 mL by nebulization 4 (Four) Times a Day. ICD10 code J96.01, Disp: 360 mL, Rfl: 0  •  LANTUS SOLOSTAR 100 UNIT/ML injection pen, Inject 95 Units under the skin into the appropriate area as directed Every 12 (Twelve) Hours., Disp: 3 mL, Rfl: 11  •  lidocaine (LIDODERM) 5 %, Place 1 patch on the skin as directed by provider Daily. Remove & Discard patch within 12 hours or as directed by MD, Disp:  15 each, Rfl: 0  •  lidocaine (XYLOCAINE) 5 % ointment, Apply  topically to the appropriate area as directed Every 2 (Two) Hours As Needed for Mild Pain ., Disp: 1 tube, Rfl: 0  •  Liraglutide (VICTOZA) 18 MG/3ML solution pen-injector injection, Inject 1.2 mg under the skin into the appropriate area as directed Every Morning., Disp: 9 mL, Rfl: 3  •  lisinopril (PRINIVIL,ZESTRIL) 10 MG tablet, Take 1 tablet by mouth Daily., Disp: 30 tablet, Rfl: 3  •  meloxicam (MOBIC) 7.5 MG tablet, Take 1 tablet by mouth Daily. Start medication on 8/24/18, Disp: 30 tablet, Rfl: 1  •  metFORMIN ER (GLUCOPHAGE-XR) 500 MG 24 hr tablet, TAKE 2 TABLET BY MOUTH 2 (TWO) TIMES A DAY WITH MEALS., Disp: 120 tablet, Rfl: 6  •  methocarbamol (ROBAXIN) 750 MG tablet, Take 1 tablet by mouth 4 (Four) Times a Day As Needed for Muscle Spasms., Disp: 30 tablet, Rfl: 1  •  metoprolol tartrate (LOPRESSOR) 50 MG tablet, Take 1 tablet by mouth Every 12 (Twelve) Hours., Disp: 60 tablet, Rfl: 3  •  montelukast (SINGULAIR) 10 MG tablet, Take 1 tablet by mouth Every Night., Disp: 30 tablet, Rfl: 3  •  naproxen (NAPROSYN) 500 MG tablet, Take 500 mg by mouth 2 (Two) Times a Day With Meals., Disp: , Rfl:   •  nitroglycerin (NITROSTAT) 0.4 MG SL tablet, Place 1 tablet under the tongue As Needed for Chest Pain. Take no more than 3 doses in 15 minutes., Disp: 30 tablet, Rfl: 3  •  nystatin (MYCOSTATIN) 151305 UNIT/GM powder, Apply  topically to the appropriate area as directed 3 (Three) Times a Day., Disp: 15 g, Rfl: 1  •  pantoprazole (PROTONIX) 40 MG EC tablet, TAKE 1 TABLET BY MOUTH DAILY., Disp: 30 tablet, Rfl: 6  •  potassium chloride (K-DUR) 10 MEQ CR tablet, Take 1 tablet by mouth Every Night., Disp: 30 tablet, Rfl: 3  •  promethazine (PHENERGAN) 25 MG tablet, Take 1 tablet by mouth Every 6 (Six) Hours As Needed for Nausea or Vomiting., Disp: 30 tablet, Rfl: 0  •  valACYclovir (VALTREX) 1000 MG tablet, Take 1 tablet by mouth 3 (Three) Times a Day., Disp:  "21 tablet, Rfl: 0  •  metFORMIN (GLUMETZA) 1000 MG (MOD) 24 hr tablet, TAKE ONE TABLET BY MOUTH TWO TIMES A DAY, Disp: 60 tablet, Rfl: 0    Allergies:  Other    Family Hx:  Family History   Problem Relation Age of Onset   • Heart disease Mother    • Cancer Mother    • Heart disease Father    • Heart disease Sister    • Cancer Sister    • Heart disease Brother         Social History:  Social History     Socioeconomic History   • Marital status:      Spouse name: wesley dumont   • Number of children: Not on file   • Years of education: Not on file   • Highest education level: Not on file   Social Needs   • Financial resource strain: Not on file   • Food insecurity - worry: Not on file   • Food insecurity - inability: Not on file   • Transportation needs - medical: Not on file   • Transportation needs - non-medical: Not on file   Occupational History   • Not on file   Tobacco Use   • Smoking status: Current Every Day Smoker     Packs/day: 1.00     Years: 40.00     Pack years: 40.00     Types: Cigarettes   • Smokeless tobacco: Never Used   Substance and Sexual Activity   • Alcohol use: No   • Drug use: Yes     Types: LSD, Marijuana, Methamphetamines     Comment: Lasted used in 2006   • Sexual activity: Not Currently     Partners: Male   Other Topics Concern   • Not on file   Social History Narrative   • Not on file       Review of Systems  Review of Systems chest burns in center with activity, 30 ft ambulation. No diaphoresis, no shortness of breath  No radiation, no palpitations  Felt unusual this am and glucose was low  Also has fullness in ears and cough    Objective:     /84   Pulse 85   Ht 157.5 cm (62\")   Wt 135 kg (297 lb 11.2 oz)   LMP  (LMP Unknown)   SpO2 97%   Breastfeeding? No   BMI 54.45 kg/m²   Physical Exam ent ok, TM's are clear  Lungs are clear  Heart regular   No distress    Lab Review  Results for orders placed or performed in visit on 02/06/19   POCT Influenza A/B   Result Value " Ref Range    Rapid Influenza A Ag Negative Negative    Rapid Influenza B Ag Negative Negative    Internal Control Passed Passed    Lot Number [id number]     Expiration Date 02-           Assessment:     Yamileth was seen today for hand pain and diabetes.    Diagnoses and all orders for this visit:    Uncontrolled type 2 diabetes mellitus with diabetic polyneuropathy, with long-term current use of insulin (CMS/[facility])  -     Microalbumin / Creatinine Urine Ratio - Urine, Clean Catch; Future  -     gabapentin (NEURONTIN) 800 MG tablet; Take 1 tablet by mouth 3 (Three) Times a Day. For neuropathy    Chronic obstructive pulmonary disease, unspecified COPD type (CMS/[facility])  -     budesonide-formoterol (SYMBICORT) 160-4.5 MCG/ACT inhaler; Inhale 2 puffs 2 (Two) Times a Day.    Chronic obstructive pulmonary disease with acute lower respiratory infection (CMS/[facility])  -     ipratropium-albuterol (DUO-NEB) 0.5-2.5 mg/3 ml nebulizer; Take 3 mL by nebulization 4 (Four) Times a Day. ICD10 code J96.01    Mild persistent asthma without complication  -     ipratropium-albuterol (DUO-NEB) 0.5-2.5 mg/3 ml nebulizer; Take 3 mL by nebulization 4 (Four) Times a Day. ICD10 code J96.01    Uncontrolled type 2 diabetes mellitus with complication, with long-term current use of insulin (CMS/[facility])  -     Liraglutide (VICTOZA) 18 MG/3ML solution pen-injector injection; Inject 1.2 mg under the skin into the appropriate area as directed Every Morning.    Flu-like symptoms  -     POCT Influenza A/B    Physical deconditioning    Eustachian tube dysfunction, right    Other orders  -     albuterol sulfate  (90 Base) MCG/ACT inhaler; Inhale 2 puffs Every 4 (Four) Hours As Needed for Wheezing.  -     albuterol (ACCUNEB) 0.63 MG/3ML nebulizer solution; Take 3 mL by nebulization Every 6 (Six) Hours As Needed for Wheezing.  -     cyclobenzaprine (FLEXERIL) 10 MG tablet; Take 1 tablet by mouth 2 (Two) Times a Day As Needed for Muscle Spasms.  -      Insulin Pen Needle (NOVOFINE) 30G X 8 MM misc; As directed 4 times daily  -     LANTUS SOLOSTAR 100 UNIT/ML injection pen; Inject 95 Units under the skin into the appropriate area as directed Every 12 (Twelve) Hours.  -     metoprolol tartrate (LOPRESSOR) 50 MG tablet; Take 1 tablet by mouth Every 12 (Twelve) Hours.  -     nystatin (MYCOSTATIN) 396139 UNIT/GM powder; Apply  topically to the appropriate area as directed 3 (Three) Times a Day.  -     potassium chloride (K-DUR) 10 MEQ CR tablet; Take 1 tablet by mouth Every Night.  -     promethazine (PHENERGAN) 25 MG tablet; Take 1 tablet by mouth Every 6 (Six) Hours As Needed for Nausea or Vomiting.  -     lidocaine (LIDODERM) 5 %; Place 1 patch on the skin as directed by provider Daily. Remove & Discard patch within 12 hours or as directed by MD  -     lidocaine (XYLOCAINE) 5 % ointment; Apply  topically to the appropriate area as directed Every 2 (Two) Hours As Needed for Mild Pain .        Plan:     I have seen and examined the patient.  I have reviewed the notes, assessments, and/or procedures performed by Dr. Nirmal Calvillo  , I concur with his  documentation and assessment and plan for Yamileth Sylvester Bryon.      This document has been electronically signed by Helder Lee MD on February 11, 2019 3:40 PM

## 2019-02-13 RX ORDER — METFORMIN HYDROCHLORIDE 1000 MG/1
1000 TABLET, FILM COATED, EXTENDED RELEASE ORAL 2 TIMES DAILY WITH MEALS
Qty: 60 TABLET | Refills: 3 | Status: SHIPPED | OUTPATIENT
Start: 2019-02-13 | End: 2019-08-08 | Stop reason: SDUPTHER

## 2019-03-04 DIAGNOSIS — E11.69 DIABETES MELLITUS TYPE 2 IN OBESE (HCC): ICD-10-CM

## 2019-03-04 DIAGNOSIS — E66.9 DIABETES MELLITUS TYPE 2 IN OBESE (HCC): ICD-10-CM

## 2019-03-07 RX ORDER — LISINOPRIL 10 MG/1
10 TABLET ORAL DAILY
Qty: 30 TABLET | Refills: 3 | Status: SHIPPED | OUTPATIENT
Start: 2019-03-07 | End: 2019-03-28 | Stop reason: SDUPTHER

## 2019-03-07 RX ORDER — CLOPIDOGREL BISULFATE 75 MG/1
75 TABLET ORAL DAILY
Qty: 30 TABLET | Refills: 3 | Status: SHIPPED | OUTPATIENT
Start: 2019-03-07 | End: 2019-07-01 | Stop reason: SDUPTHER

## 2019-03-13 ENCOUNTER — PATIENT OUTREACH (OUTPATIENT)
Dept: CASE MANAGEMENT | Facility: OTHER | Age: 59
End: 2019-03-13

## 2019-03-13 ENCOUNTER — EPISODE CHANGES (OUTPATIENT)
Dept: CASE MANAGEMENT | Facility: OTHER | Age: 59
End: 2019-03-13

## 2019-03-15 DIAGNOSIS — R52 PAIN: ICD-10-CM

## 2019-03-16 RX ORDER — NAPROXEN 500 MG/1
500 TABLET ORAL 2 TIMES DAILY WITH MEALS
Qty: 60 TABLET | Refills: 3 | Status: SHIPPED | OUTPATIENT
Start: 2019-03-16 | End: 2019-05-03 | Stop reason: SDUPTHER

## 2019-03-19 ENCOUNTER — PATIENT OUTREACH (OUTPATIENT)
Dept: CASE MANAGEMENT | Facility: OTHER | Age: 59
End: 2019-03-19

## 2019-03-25 ENCOUNTER — TELEPHONE (OUTPATIENT)
Dept: FAMILY MEDICINE CLINIC | Facility: CLINIC | Age: 59
End: 2019-03-25

## 2019-03-25 NOTE — TELEPHONE ENCOUNTER
Sita from [facility] at home called and is needing a verbal regarding getting physical therapy done at home. She would like a call back please at [phone].      Thank you,    Luisa

## 2019-03-26 ENCOUNTER — TELEPHONE (OUTPATIENT)
Dept: FAMILY MEDICINE CLINIC | Facility: CLINIC | Age: 59
End: 2019-03-26

## 2019-03-26 NOTE — TELEPHONE ENCOUNTER
Sita from [facility] home called and is needing a verbal regarding getting physical therapy done at home. She would like a call back please at [phone].    Pauline

## 2019-03-28 ENCOUNTER — OFFICE VISIT (OUTPATIENT)
Dept: FAMILY MEDICINE CLINIC | Facility: CLINIC | Age: 59
End: 2019-03-28

## 2019-03-28 ENCOUNTER — APPOINTMENT (OUTPATIENT)
Dept: LAB | Facility: HOSPITAL | Age: 59
End: 2019-03-28

## 2019-03-28 VITALS
HEIGHT: 62 IN | OXYGEN SATURATION: 96 % | BODY MASS INDEX: 53.37 KG/M2 | DIASTOLIC BLOOD PRESSURE: 78 MMHG | SYSTOLIC BLOOD PRESSURE: 126 MMHG | WEIGHT: 290 LBS | HEART RATE: 91 BPM

## 2019-03-28 DIAGNOSIS — R30.0 DYSURIA: ICD-10-CM

## 2019-03-28 DIAGNOSIS — E11.65 TYPE 2 DIABETES MELLITUS WITH HYPERGLYCEMIA, WITH LONG-TERM CURRENT USE OF INSULIN (HCC): ICD-10-CM

## 2019-03-28 DIAGNOSIS — Z79.4 TYPE 2 DIABETES MELLITUS WITH HYPERGLYCEMIA, WITH LONG-TERM CURRENT USE OF INSULIN (HCC): ICD-10-CM

## 2019-03-28 DIAGNOSIS — Z09 HOSPITAL DISCHARGE FOLLOW-UP: Primary | ICD-10-CM

## 2019-03-28 LAB
ANION GAP SERPL CALCULATED.3IONS-SCNC: 15.9 MMOL/L
BACTERIA UR QL AUTO: ABNORMAL /HPF
BILIRUB BLD-MCNC: NEGATIVE MG/DL
BILIRUB UR QL STRIP: NEGATIVE
BUN BLD-MCNC: 24 MG/DL (ref 8–23)
BUN/CREAT SERPL: 17.6 (ref 7–25)
CALCIUM SPEC-SCNC: 8.3 MG/DL (ref 8.6–10.5)
CHLORIDE SERPL-SCNC: 98 MMOL/L (ref 98–107)
CLARITY UR: CLEAR
CLARITY, POC: ABNORMAL
CO2 SERPL-SCNC: 25.1 MMOL/L (ref 22–29)
COLOR UR: YELLOW
COLOR UR: YELLOW
CREAT BLD-MCNC: 1.36 MG/DL (ref 0.57–1)
GFR SERPL CREATININE-BSD FRML MDRD: 40 ML/MIN/1.73
GLUCOSE BLD-MCNC: 112 MG/DL (ref 65–99)
GLUCOSE UR STRIP-MCNC: NEGATIVE MG/DL
GLUCOSE UR STRIP-MCNC: NEGATIVE MG/DL
HBA1C MFR BLD: 7.32 % (ref 4.8–5.6)
HGB UR QL STRIP.AUTO: NEGATIVE
HYALINE CASTS UR QL AUTO: ABNORMAL /LPF
KETONES UR QL STRIP: NEGATIVE
KETONES UR QL: NEGATIVE
LEUKOCYTE EST, POC: ABNORMAL
LEUKOCYTE ESTERASE UR QL STRIP.AUTO: ABNORMAL
NITRITE UR QL STRIP: NEGATIVE
NITRITE UR-MCNC: NEGATIVE MG/ML
PH UR STRIP.AUTO: 5.5 [PH] (ref 5–8)
PH UR: 5.5 [PH] (ref 5–8)
POTASSIUM BLD-SCNC: 4 MMOL/L (ref 3.5–5.2)
PROT UR QL STRIP: ABNORMAL
PROT UR STRIP-MCNC: ABNORMAL MG/DL
RBC # UR STRIP: ABNORMAL /UL
RBC # UR: ABNORMAL /HPF
REF LAB TEST METHOD: ABNORMAL
SODIUM BLD-SCNC: 139 MMOL/L (ref 136–145)
SP GR UR STRIP: 1.01 (ref 1–1.03)
SP GR UR: 1.01 (ref 1–1.03)
SQUAMOUS #/AREA URNS HPF: ABNORMAL /HPF
UROBILINOGEN UR QL STRIP: ABNORMAL
UROBILINOGEN UR QL: NORMAL
WBC UR QL AUTO: ABNORMAL /HPF

## 2019-03-28 PROCEDURE — 87077 CULTURE AEROBIC IDENTIFY: CPT

## 2019-03-28 PROCEDURE — 81001 URINALYSIS AUTO W/SCOPE: CPT | Performed by: FAMILY MEDICINE

## 2019-03-28 PROCEDURE — 36415 COLL VENOUS BLD VENIPUNCTURE: CPT | Performed by: FAMILY MEDICINE

## 2019-03-28 PROCEDURE — 80048 BASIC METABOLIC PNL TOTAL CA: CPT | Performed by: FAMILY MEDICINE

## 2019-03-28 PROCEDURE — 83036 HEMOGLOBIN GLYCOSYLATED A1C: CPT | Performed by: FAMILY MEDICINE

## 2019-03-28 PROCEDURE — 87086 URINE CULTURE/COLONY COUNT: CPT | Performed by: FAMILY MEDICINE

## 2019-03-28 PROCEDURE — 87186 SC STD MICRODIL/AGAR DIL: CPT | Performed by: FAMILY MEDICINE

## 2019-03-28 PROCEDURE — 99213 OFFICE O/P EST LOW 20 MIN: CPT | Performed by: FAMILY MEDICINE

## 2019-03-28 PROCEDURE — 87186 SC STD MICRODIL/AGAR DIL: CPT

## 2019-03-28 PROCEDURE — 81003 URINALYSIS AUTO W/O SCOPE: CPT | Performed by: FAMILY MEDICINE

## 2019-04-02 ENCOUNTER — PATIENT OUTREACH (OUTPATIENT)
Dept: CASE MANAGEMENT | Facility: OTHER | Age: 59
End: 2019-04-02

## 2019-04-04 LAB — BACTERIA SPEC AEROBE CULT: ABNORMAL

## 2019-04-16 DIAGNOSIS — J44.0 CHRONIC OBSTRUCTIVE PULMONARY DISEASE WITH ACUTE LOWER RESPIRATORY INFECTION (HCC): ICD-10-CM

## 2019-04-23 RX ORDER — MONTELUKAST SODIUM 10 MG/1
10 TABLET ORAL NIGHTLY
Qty: 30 TABLET | Refills: 3 | Status: SHIPPED | OUTPATIENT
Start: 2019-04-23 | End: 2019-08-08 | Stop reason: SDUPTHER

## 2019-04-23 RX ORDER — ATORVASTATIN CALCIUM 20 MG/1
20 TABLET, FILM COATED ORAL DAILY
Qty: 30 TABLET | Refills: 3 | Status: SHIPPED | OUTPATIENT
Start: 2019-04-23 | End: 2019-05-03 | Stop reason: SDUPTHER

## 2019-05-01 ENCOUNTER — PATIENT OUTREACH (OUTPATIENT)
Dept: CASE MANAGEMENT | Facility: OTHER | Age: 59
End: 2019-05-01

## 2019-05-01 ENCOUNTER — EPISODE CHANGES (OUTPATIENT)
Dept: CASE MANAGEMENT | Facility: OTHER | Age: 59
End: 2019-05-01

## 2019-05-01 NOTE — OUTREACH NOTE
Care Coordination Note    Message left for [facility] with Dr. Calvillo about my role as RN-CC and the 4 unsuccessful attempts at outreach with the patient. Provided my contact information for questions.    Bailey Mena RN    5/1/2019, 3:31 PM

## 2019-05-03 ENCOUNTER — OFFICE VISIT (OUTPATIENT)
Dept: FAMILY MEDICINE CLINIC | Facility: CLINIC | Age: 59
End: 2019-05-03

## 2019-05-03 VITALS
DIASTOLIC BLOOD PRESSURE: 88 MMHG | OXYGEN SATURATION: 96 % | HEART RATE: 83 BPM | BODY MASS INDEX: 51.51 KG/M2 | WEIGHT: 279.9 LBS | HEIGHT: 62 IN | SYSTOLIC BLOOD PRESSURE: 134 MMHG

## 2019-05-03 DIAGNOSIS — J18.9 PNEUMONIA OF BOTH LUNGS DUE TO INFECTIOUS ORGANISM, UNSPECIFIED PART OF LUNG: ICD-10-CM

## 2019-05-03 DIAGNOSIS — IMO0002 UNCONTROLLED TYPE 2 DIABETES MELLITUS WITH DIABETIC POLYNEUROPATHY, WITH LONG-TERM CURRENT USE OF INSULIN: ICD-10-CM

## 2019-05-03 DIAGNOSIS — I87.2 VENOUS INSUFFICIENCY: ICD-10-CM

## 2019-05-03 DIAGNOSIS — Z76.0 ENCOUNTER FOR MEDICATION REFILL: Primary | ICD-10-CM

## 2019-05-03 DIAGNOSIS — R52 PAIN: ICD-10-CM

## 2019-05-03 DIAGNOSIS — J44.9 CHRONIC OBSTRUCTIVE PULMONARY DISEASE, UNSPECIFIED COPD TYPE (HCC): ICD-10-CM

## 2019-05-03 DIAGNOSIS — IMO0002 UNCONTROLLED TYPE 2 DIABETES MELLITUS WITH COMPLICATION, WITH LONG-TERM CURRENT USE OF INSULIN: ICD-10-CM

## 2019-05-03 DIAGNOSIS — D50.8 OTHER IRON DEFICIENCY ANEMIA: ICD-10-CM

## 2019-05-03 PROCEDURE — 99213 OFFICE O/P EST LOW 20 MIN: CPT | Performed by: STUDENT IN AN ORGANIZED HEALTH CARE EDUCATION/TRAINING PROGRAM

## 2019-05-03 RX ORDER — BUDESONIDE AND FORMOTEROL FUMARATE DIHYDRATE 160; 4.5 UG/1; UG/1
2 AEROSOL RESPIRATORY (INHALATION)
Qty: 10.2 G | Refills: 6 | Status: SHIPPED | OUTPATIENT
Start: 2019-05-03 | End: 2019-11-19 | Stop reason: SDUPTHER

## 2019-05-03 RX ORDER — GABAPENTIN 800 MG/1
800 TABLET ORAL 3 TIMES DAILY
Qty: 90 TABLET | Refills: 1 | Status: SHIPPED | OUTPATIENT
Start: 2019-05-03 | End: 2019-08-07 | Stop reason: SDUPTHER

## 2019-05-03 RX ORDER — CYCLOBENZAPRINE HCL 10 MG
10 TABLET ORAL 2 TIMES DAILY PRN
Qty: 60 TABLET | Refills: 2 | Status: SHIPPED | OUTPATIENT
Start: 2019-05-03 | End: 2019-08-08 | Stop reason: SDUPTHER

## 2019-05-03 RX ORDER — ALBUTEROL SULFATE 0.63 MG/3ML
1 SOLUTION RESPIRATORY (INHALATION) EVERY 6 HOURS PRN
Qty: 20 VIAL | Refills: 0 | Status: SHIPPED | OUTPATIENT
Start: 2019-05-03 | End: 2020-08-05 | Stop reason: SDUPTHER

## 2019-05-03 RX ORDER — LIDOCAINE 50 MG/G
OINTMENT TOPICAL
Qty: 1 TUBE | Refills: 0 | Status: SHIPPED | OUTPATIENT
Start: 2019-05-03 | End: 2020-08-05 | Stop reason: SDUPTHER

## 2019-05-03 RX ORDER — ALBUTEROL SULFATE 0.63 MG/3ML
1 SOLUTION RESPIRATORY (INHALATION) EVERY 6 HOURS PRN
Qty: 20 VIAL | Refills: 0 | Status: SHIPPED | OUTPATIENT
Start: 2019-05-03 | End: 2019-05-03 | Stop reason: SDUPTHER

## 2019-05-03 RX ORDER — NAPROXEN 500 MG/1
500 TABLET ORAL 2 TIMES DAILY WITH MEALS
Qty: 60 TABLET | Refills: 3 | Status: SHIPPED | OUTPATIENT
Start: 2019-05-03 | End: 2019-08-08 | Stop reason: SDUPTHER

## 2019-05-03 RX ORDER — ATORVASTATIN CALCIUM 20 MG/1
20 TABLET, FILM COATED ORAL DAILY
Qty: 30 TABLET | Refills: 3 | Status: SHIPPED | OUTPATIENT
Start: 2019-05-03 | End: 2019-08-08 | Stop reason: SDUPTHER

## 2019-05-03 RX ORDER — FERROUS SULFATE 325(65) MG
325 TABLET ORAL
Qty: 30 TABLET | Refills: 3 | Status: SHIPPED | OUTPATIENT
Start: 2019-05-03 | End: 2019-08-08 | Stop reason: SDUPTHER

## 2019-05-03 RX ORDER — LIDOCAINE 50 MG/G
1 PATCH TOPICAL
Qty: 15 EACH | Refills: 0 | Status: SHIPPED | OUTPATIENT
Start: 2019-05-03 | End: 2020-08-05 | Stop reason: SDUPTHER

## 2019-05-03 RX ORDER — ALBUTEROL SULFATE 90 UG/1
2 AEROSOL, METERED RESPIRATORY (INHALATION) EVERY 4 HOURS PRN
Qty: 1 INHALER | Refills: 0 | Status: SHIPPED | OUTPATIENT
Start: 2019-05-03 | End: 2020-08-05 | Stop reason: SDUPTHER

## 2019-05-03 RX ORDER — CETIRIZINE HYDROCHLORIDE 10 MG/1
10 TABLET ORAL DAILY
Qty: 30 TABLET | Refills: 3 | Status: SHIPPED | OUTPATIENT
Start: 2019-05-03 | End: 2020-08-05 | Stop reason: SDUPTHER

## 2019-05-03 RX ORDER — METOPROLOL TARTRATE 50 MG/1
50 TABLET, FILM COATED ORAL EVERY 12 HOURS SCHEDULED
Qty: 60 TABLET | Refills: 3 | Status: SHIPPED | OUTPATIENT
Start: 2019-05-03 | End: 2019-08-08 | Stop reason: SDUPTHER

## 2019-05-03 RX ORDER — PROMETHAZINE HYDROCHLORIDE 25 MG/1
25 TABLET ORAL EVERY 6 HOURS PRN
Qty: 30 TABLET | Refills: 0 | Status: SHIPPED | OUTPATIENT
Start: 2019-05-03 | End: 2019-08-08 | Stop reason: SDUPTHER

## 2019-05-03 RX ORDER — ALBUTEROL SULFATE 90 UG/1
2 AEROSOL, METERED RESPIRATORY (INHALATION) EVERY 4 HOURS PRN
Qty: 1 INHALER | Refills: 0 | Status: SHIPPED | OUTPATIENT
Start: 2019-05-03 | End: 2019-05-03 | Stop reason: SDUPTHER

## 2019-05-03 RX ORDER — INSULIN GLARGINE 100 [IU]/ML
95 INJECTION, SOLUTION SUBCUTANEOUS EVERY 12 HOURS
Qty: 3 ML | Refills: 11 | Status: SHIPPED | OUTPATIENT
Start: 2019-05-03 | End: 2019-08-08 | Stop reason: SDUPTHER

## 2019-05-03 NOTE — PROGRESS NOTES
Subjective   Yamileth Slater is a 60 y.o. female who presents for medication refill. She would also want to discuss new lesions on her lower legs.  She started noticing ulcerations on both of her lower legs in the last few weeks. She recently sold her recliner and states that she has been sitting with her legs down for prolonged periods of time.  She feels that her skin is very tight.  Some of the lesions periodically drain.  They are not painful.    She is recovering from her bilateral pneumonia and asserts to using her home oxygen less than before.  She denies any fevers, shortness of breath, or chest tightness.    Mamadou reviewed. Request #[id number]       Past Medical Hx:  Past Medical History:   Diagnosis Date   • Anxiety states    • Asthma    • Carotid artery stenosis     DWIGHT 0-15%, LICA 0-15%. LICA stent 5/2014.   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 Normal LV systolic function with no wall motion abnormality.    • CARDIAC CATHETERIZATION   05/27/2014     LEFT Carotid Stent    • CAROTID STENT Left 05/27/2014   • COLONOSCOPY  05/02/2016    Normal colon.Diverticulosis in the sigmoid colon.No specimens collected.    • CORONARY ARTERY BYPASS GRAFT  11/15/2013    CABG x 3 with LIMA to LAD and coronary endarterectomy to LAD, SVG to Ramus intermedius branch and SVG to PDA.    • CYSTOSCOPY URETEROSCOPY LASER LITHOTRIPSY     • ENDOSCOPY  09/23/2015     Esophageal stricture present.Normal stomach.Normal duodenum.    • ENDOSCOPY  11/16/2015    w/ dilatation - Esophageal stricture present,Dilatation performed.Normal stomach and duodenum.    • HYSTERECTOMY     • INCISION AND DRAINAGE ABSCESS  01/02/2016    Incision and drainage of right perineal abscess.    • INCISION AND DRAINAGE ABSCESS  02/18/2014    Incision and Drainage of abscess of left lower leg    • OTHER SURGICAL HISTORY  01/25/2016    DESTRUCTION OF BENIGN LESION      • OTHER SURGICAL HISTORY  04/18/2014    ear/neck - L attempted CEA, Neck Dissection    • TUBAL ABDOMINAL LIGATION         Health Maintenance:  Health Maintenance   Topic Date Due   • ZOSTER VACCINE (1 of 2) 03/10/2009   • MEDICARE ANNUAL WELLNESS  08/24/2016   • DIABETIC EYE EXAM  08/24/2016   • URINE MICROALBUMIN  10/05/2018   • DIABETIC FOOT EXAM  12/26/2019   • PAP SMEAR  01/04/2020   • INFLUENZA VACCINE  08/01/2020   • HEMOGLOBIN A1C  12/08/2020   • LUNG CANCER SCREENING  01/10/2021   • COLONOSCOPY  05/02/2021   • LIPID PANEL  06/24/2021   • MAMMOGRAM  01/10/2022   • TDAP/TD VACCINES (3 - Td) 11/10/2024   • PNEUMOCOCCAL VACCINE (19-64 MEDIUM RISK)  Completed   • HEPATITIS C SCREENING  Completed       Current Meds:    Current Outpatient Medications:   •  albuterol (ACCUNEB) 0.63 MG/3ML nebulizer solution, Take 3 mL by nebulization Every 6 (Six) Hours As Needed for Wheezing., Disp: 20 vial, Rfl: 0  •  albuterol sulfate  (90 Base) MCG/ACT inhaler, Inhale 2 puffs Every 4 (Four) Hours As Needed for Wheezing., Disp: 1 inhaler, Rfl:  0  •  betamethasone valerate (VALISONE) 0.1 % cream, Apply  topically to the appropriate area as directed Daily., Disp: 45 g, Rfl: 2  •  Blood Glucose Monitoring Suppl ([employer] BLOOD GLUCOSE METER) W/DEVICE kit, 1 each 3 (Three) Times a Day., Disp: 1 kit, Rfl: 3  •  cetirizine (zyrTEC) 10 MG tablet, Take 1 tablet by mouth Daily., Disp: 30 tablet, Rfl: 3  •  glucose blood test strip, Use as instructed, Disp: 100 each, Rfl: 12  •  Insulin Pen Needle (NOVOFINE) 30G X 8 MM misc, As directed 4 times daily, Disp: 100 each, Rfl: 11  •  ipratropium-albuterol (DUO-NEB) 0.5-2.5 mg/3 ml nebulizer, Take 3 mL by nebulization 4 (Four) Times a Day. ICD10 code J96.01, Disp: 360 mL, Rfl: 0  •  lidocaine (LIDODERM) 5 %, Place 1 patch on the skin as directed by provider Daily. Remove & Discard patch within 12 hours or as directed by MD, Disp: 15 each, Rfl: 0  •  lidocaine (XYLOCAINE) 5 % ointment, Apply  topically to the appropriate area as directed Every 2 (Two) Hours As Needed for Mild Pain ., Disp: 1 tube, Rfl: 0  •  nystatin (MYCOSTATIN) 277296 UNIT/GM powder, Apply  topically to the appropriate area as directed 3 (Three) Times a Day., Disp: 15 g, Rfl: 1  •  aspirin 81 MG EC tablet, Take 1 tablet by mouth Daily., Disp: 31 tablet, Rfl: 6  •  atorvastatin (LIPITOR) 40 MG tablet, Take 1 tablet by mouth Daily., Disp: 90 tablet, Rfl: 0  •  clopidogrel (PLAVIX) 75 MG tablet, Take 1 tablet by mouth Daily., Disp: 30 tablet, Rfl: 5  •  cyclobenzaprine (FLEXERIL) 10 MG tablet, Take 1 tablet by mouth 2 (Two) Times a Day As Needed for Muscle Spasms., Disp: 60 tablet, Rfl: 5  •  Empagliflozin (Jardiance) 10 MG tablet, Take 10 mg by mouth Every Morning., Disp: 90 tablet, Rfl: 3  •  ferrous sulfate (FeroSul) 325 (65 FE) MG tablet, Take 1 tablet by mouth Daily With Breakfast., Disp: 30 tablet, Rfl: 3  •  furosemide (LASIX) 40 MG tablet, TAKE 1 TABLET BY MOUTH DAILY., Disp: 30 tablet, Rfl: 3  •  gabapentin (NEURONTIN) 800 MG tablet, Take 1 tablet by  mouth 3 (Three) Times a Day. For neuropathy, Disp: 90 tablet, Rfl: 2  •  LANTUS SOLOSTAR 100 UNIT/ML injection pen, INJECT 95 UNITS UNDER THE SKIN INTO THE APPROPRIATE AREA AS DIRECTED EVERY 12 (TWELVE) HOURS., Disp: 60 mL, Rfl: 11  •  LANTUS SOLOSTAR 100 UNIT/ML injection pen, INJECT 95 UNITS UNDER THE SKIN INTO THE APPROPRIATE AREA AS DIRECTED EVERY 12 (TWELVE) HOURS., Disp: 60 mL, Rfl: 11  •  lisinopril (PRINIVIL,ZESTRIL) 10 MG tablet, Take 1 tablet by mouth Daily., Disp: 30 tablet, Rfl: 5  •  metFORMIN (GLUCOPHAGE) 1000 MG tablet, Take 1 tablet by mouth 2 (Two) Times a Day With Meals., Disp: 60 tablet, Rfl: 5  •  metFORMIN (GLUMETZA) 1000 MG (MOD) 24 hr tablet, TAKE ONE TABLET BY MOUTH TWO TIMES A DAY, Disp: 60 tablet, Rfl: 5  •  metoprolol tartrate (LOPRESSOR) 50 MG tablet, TAKE 1 TABLET BY MOUTH EVERY 12 (TWELVE) HOURS., Disp: 60 tablet, Rfl: 5  •  montelukast (SINGULAIR) 10 MG tablet, TAKE 1 TABLET BY MOUTH EVERY NIGHT., Disp: 30 tablet, Rfl: 5  •  naproxen (NAPROSYN) 500 MG tablet, Take 1 tablet by mouth 2 (Two) Times a Day With Meals., Disp: 60 tablet, Rfl: 3  •  nicotine (NICODERM CQ) 21 MG/24HR patch, Place 1 patch on the skin as directed by provider Daily., Disp: 42 patch, Rfl: 0  •  nitroglycerin (NITROSTAT) 0.4 MG SL tablet, Place 1 tablet under the tongue As Needed for Chest Pain. Take no more than 3 doses in 15 minutes., Disp: 30 tablet, Rfl: 3  •  ondansetron ODT (ZOFRAN ODT) 4 MG disintegrating tablet, Take 1 tablet by mouth Every 12 (Twelve) Hours As Needed for Nausea or Vomiting., Disp: 30 tablet, Rfl: 0  •  pantoprazole (PROTONIX) 40 MG EC tablet, Take 1 tablet by mouth Daily., Disp: 30 tablet, Rfl: 6  •  potassium chloride (MICRO-K) 10 MEQ CR capsule, TAKE 1 CAPSULE BY MOUTH EVERY NIGHT., Disp: 30 capsule, Rfl: 3  •  promethazine (PHENERGAN) 25 MG tablet, Take 1 tablet by mouth Every 6 (Six) Hours As Needed for Nausea or Vomiting., Disp: 30 tablet, Rfl: 0  •  SYMBICORT 160-4.5 MCG/ACT inhaler,  "INHALE 2 PUFFS 2 (TWO) TIMES A DAY., Disp: 10.2 g, Rfl: 6  •  VICTOZA 18 MG/3ML solution pen-injector injection, INJECT 1.2 MG UNDER THE SKIN INTO THE APPROPRIATE AREA AS DIRECTED EVERY MORNING., Disp: 6 mL, Rfl: 3    Allergies:  Adhesive tape and Other    Family Hx:  Family History   Problem Relation Age of Onset   • Heart disease Mother    • Cancer Mother    • Heart disease Father    • Heart disease Sister    • Cancer Sister    • Heart disease Brother         Social History:  Social History     Socioeconomic History   • Marital status:      Spouse name: wesley dumont   • Number of children: Not on file   • Years of education: Not on file   • Highest education level: Not on file   Tobacco Use   • Smoking status: Current Every Day Smoker     Packs/day: 1.00     Years: 40.00     Pack years: 40.00     Types: Cigarettes   • Smokeless tobacco: Never Used   Substance and Sexual Activity   • Alcohol use: No   • Drug use: Yes     Types: LSD, Marijuana, Methamphetamines     Comment: Lasted used in 2006   • Sexual activity: Not Currently     Partners: Male       Review of Systems  Review of Systems   Constitutional: Negative for activity change and appetite change.   HENT: Negative for congestion and trouble swallowing.    Respiratory: Negative for chest tightness and shortness of breath.    Cardiovascular: Negative for chest pain and palpitations.   Gastrointestinal: Negative for abdominal distention and abdominal pain.   Genitourinary: Negative for difficulty urinating and dysuria.   Musculoskeletal: Negative for arthralgias and myalgias.   Skin: Positive for wound (Bilateral lower extremities. ). Negative for color change and pallor.   Neurological: Negative for dizziness, light-headedness and headaches.   Psychiatric/Behavioral: Negative for agitation and behavioral problems.            Objective:     /88   Pulse 83   Ht 157.5 cm (62\")   Wt 127 kg (279 lb 14.4 oz)   LMP  (LMP Unknown)   SpO2 96%   " Breastfeeding No   BMI 51.19 kg/m²       Physical Exam   Constitutional: She is oriented to person, place, and time. She appears well-developed and well-nourished. No distress.   HENT:   Head: Normocephalic and atraumatic.   Right Ear: External ear normal.   Left Ear: External ear normal.   Eyes: Pupils are equal, round, and reactive to light. EOM are normal.   Neck: Normal range of motion. Neck supple.   Cardiovascular: Normal rate, regular rhythm and normal heart sounds. Exam reveals no gallop and no friction rub.   No murmur heard.  Pulmonary/Chest: Effort normal. No respiratory distress. She has no wheezes. She has rhonchi. She has rales.   Abdominal: Soft. Bowel sounds are normal. She exhibits no distension. There is no tenderness.   Musculoskeletal: Normal range of motion. She exhibits no edema.   Neurological: She is alert and oriented to person, place, and time.   Skin: Skin is warm. No erythema.   Lower extremity lesions bilaterally with skin intact. Lesions are circular, mildly discolored with no bleeding or purulent drainiage   Psychiatric: She has a normal mood and affect. Her behavior is normal.       Assessment/Plan:     1. Encounter for medication refill   - Continue current medication     2. Pneumonia of both lungs due to infectious organism, unspecified part of lung   - Improving. Continue to monitor.  - Counseled to stop smoking. Especially since she takes off here oxygen to take a smoke break outside.      3. Venous insufficiency   - Recommended laying on the couch and elevating feet above the level of the heart.  Believe these lesions are secondary to leaving her legs dependent for increased amounts of time since selling her recliner.          Follow-up:     Return in about 3 months (around 8/3/2019). to refill gabapentin and reevaluate venous insufficiency.    Goals     • Fasting Blood Glucose       Barriers: compliance with diet      • Quit smoking / using tobacco            Preventative:    Vaccines Recommended at this visit:   No Vaccines recommended today. Patient is up to date on all vaccines.     Vaccines Received at this visit:  No Vaccines recommended today. Patient is up to date on all vaccines.     Screenings Recommended at this visit:  Lipid Panel, Low Dose CT (lung cancer), Mammogram and Urine Microalbumin     Screenings Ordered at this visit:  None    Smoking Status:  Patient is current smoker. Patient is not interested in smoking cessation.    Alcohol Intake:  Patient does not drink    Patient's Body mass index is 51.19 kg/m². BMI is above normal parameters. Recommendations include: exercise counseling and nutrition counseling.       RISK SCORE: 3          This document has been electronically signed by Nirmal Calvillo MD on July 2, 2020 18:42

## 2019-05-07 ENCOUNTER — EPISODE CHANGES (OUTPATIENT)
Dept: CASE MANAGEMENT | Facility: OTHER | Age: 59
End: 2019-05-07

## 2019-06-27 ENCOUNTER — TELEPHONE (OUTPATIENT)
Dept: FAMILY MEDICINE CLINIC | Facility: CLINIC | Age: 59
End: 2019-06-27

## 2019-06-27 NOTE — TELEPHONE ENCOUNTER
Pt called and is needing a refill on her pantoprazole (PROTONIX) 40 MG EC tablet and would like for it to be called into [place] [facility].    Thank you,    Luisa

## 2019-06-28 ENCOUNTER — HOSPITAL ENCOUNTER (EMERGENCY)
Facility: HOSPITAL | Age: 59
Discharge: HOME | End: 2019-06-29
Attending: EMERGENCY MEDICINE | Admitting: EMERGENCY MEDICINE

## 2019-06-28 DIAGNOSIS — R59.9 ENLARGED LYMPH NODE: Primary | ICD-10-CM

## 2019-06-28 DIAGNOSIS — R21 RASH AND NONSPECIFIC SKIN ERUPTION: ICD-10-CM

## 2019-06-28 PROCEDURE — 99283 EMERGENCY DEPT VISIT LOW MDM: CPT

## 2019-06-29 VITALS
WEIGHT: 290 LBS | OXYGEN SATURATION: 95 % | RESPIRATION RATE: 18 BRPM | DIASTOLIC BLOOD PRESSURE: 61 MMHG | BODY MASS INDEX: 53.37 KG/M2 | SYSTOLIC BLOOD PRESSURE: 133 MMHG | HEIGHT: 62 IN | HEART RATE: 83 BPM | TEMPERATURE: 97.9 F

## 2019-07-01 ENCOUNTER — TELEPHONE (OUTPATIENT)
Dept: FAMILY MEDICINE CLINIC | Facility: CLINIC | Age: 59
End: 2019-07-01

## 2019-07-01 DIAGNOSIS — E11.69 DIABETES MELLITUS TYPE 2 IN OBESE (HCC): ICD-10-CM

## 2019-07-01 DIAGNOSIS — E66.9 DIABETES MELLITUS TYPE 2 IN OBESE (HCC): ICD-10-CM

## 2019-07-01 RX ORDER — PANTOPRAZOLE SODIUM 40 MG/1
40 TABLET, DELAYED RELEASE ORAL DAILY
Qty: 30 TABLET | Refills: 6 | Status: SHIPPED | OUTPATIENT
Start: 2019-07-01 | End: 2020-01-22

## 2019-07-05 RX ORDER — CLOPIDOGREL BISULFATE 75 MG/1
75 TABLET ORAL DAILY
Qty: 30 TABLET | Refills: 5 | Status: SHIPPED | OUTPATIENT
Start: 2019-07-05 | End: 2020-01-02

## 2019-07-05 RX ORDER — LISINOPRIL 10 MG/1
10 TABLET ORAL DAILY
Qty: 30 TABLET | Refills: 5 | Status: SHIPPED | OUTPATIENT
Start: 2019-07-05 | End: 2020-01-02

## 2019-07-09 DIAGNOSIS — E66.9 DIABETES MELLITUS TYPE 2 IN OBESE (HCC): ICD-10-CM

## 2019-07-09 DIAGNOSIS — E11.69 DIABETES MELLITUS TYPE 2 IN OBESE (HCC): ICD-10-CM

## 2019-07-12 RX ORDER — INSULIN LISPRO 100 [IU]/ML
INJECTION, SOLUTION INTRAVENOUS; SUBCUTANEOUS
Qty: 60 ML | Refills: 11 | Status: SHIPPED | OUTPATIENT
Start: 2019-07-12 | End: 2019-12-11

## 2019-07-15 ENCOUNTER — EPISODE CHANGES (OUTPATIENT)
Dept: CASE MANAGEMENT | Facility: OTHER | Age: 59
End: 2019-07-15

## 2019-08-07 ENCOUNTER — OFFICE VISIT (OUTPATIENT)
Dept: FAMILY MEDICINE CLINIC | Facility: CLINIC | Age: 59
End: 2019-08-07

## 2019-08-07 VITALS
BODY MASS INDEX: 53.18 KG/M2 | OXYGEN SATURATION: 95 % | DIASTOLIC BLOOD PRESSURE: 72 MMHG | SYSTOLIC BLOOD PRESSURE: 126 MMHG | HEART RATE: 77 BPM | HEIGHT: 62 IN | WEIGHT: 289 LBS

## 2019-08-07 DIAGNOSIS — Z71.6 ENCOUNTER FOR SMOKING CESSATION COUNSELING: ICD-10-CM

## 2019-08-07 DIAGNOSIS — IMO0002 UNCONTROLLED TYPE 2 DIABETES MELLITUS WITH COMPLICATION, WITH LONG-TERM CURRENT USE OF INSULIN: ICD-10-CM

## 2019-08-07 DIAGNOSIS — D50.8 OTHER IRON DEFICIENCY ANEMIA: ICD-10-CM

## 2019-08-07 DIAGNOSIS — J44.0 CHRONIC OBSTRUCTIVE PULMONARY DISEASE WITH ACUTE LOWER RESPIRATORY INFECTION (HCC): ICD-10-CM

## 2019-08-07 DIAGNOSIS — K21.9 GASTROESOPHAGEAL REFLUX DISEASE, ESOPHAGITIS PRESENCE NOT SPECIFIED: ICD-10-CM

## 2019-08-07 DIAGNOSIS — IMO0002 UNCONTROLLED TYPE 2 DIABETES MELLITUS WITH DIABETIC POLYNEUROPATHY, WITH LONG-TERM CURRENT USE OF INSULIN: Primary | ICD-10-CM

## 2019-08-07 DIAGNOSIS — R52 PAIN: ICD-10-CM

## 2019-08-07 PROCEDURE — 99213 OFFICE O/P EST LOW 20 MIN: CPT | Performed by: STUDENT IN AN ORGANIZED HEALTH CARE EDUCATION/TRAINING PROGRAM

## 2019-08-07 RX ORDER — GABAPENTIN 800 MG/1
800 TABLET ORAL 3 TIMES DAILY
Qty: 90 TABLET | Refills: 2 | Status: SHIPPED | OUTPATIENT
Start: 2019-08-07 | End: 2019-12-10 | Stop reason: SDUPTHER

## 2019-08-08 RX ORDER — METOPROLOL TARTRATE 50 MG/1
50 TABLET, FILM COATED ORAL EVERY 12 HOURS SCHEDULED
Qty: 60 TABLET | Refills: 3 | Status: SHIPPED | OUTPATIENT
Start: 2019-08-08 | End: 2020-02-12

## 2019-08-08 RX ORDER — MONTELUKAST SODIUM 10 MG/1
10 TABLET ORAL NIGHTLY
Qty: 30 TABLET | Refills: 3 | Status: SHIPPED | OUTPATIENT
Start: 2019-08-08 | End: 2020-01-13

## 2019-08-08 RX ORDER — INSULIN GLARGINE 100 [IU]/ML
95 INJECTION, SOLUTION SUBCUTANEOUS EVERY 12 HOURS
Qty: 3 ML | Refills: 11 | Status: SHIPPED | OUTPATIENT
Start: 2019-08-08 | End: 2020-05-11

## 2019-08-08 RX ORDER — NAPROXEN 500 MG/1
500 TABLET ORAL 2 TIMES DAILY WITH MEALS
Qty: 60 TABLET | Refills: 3 | Status: SHIPPED | OUTPATIENT
Start: 2019-08-08 | End: 2020-08-05 | Stop reason: SDUPTHER

## 2019-08-08 RX ORDER — ATORVASTATIN CALCIUM 20 MG/1
20 TABLET, FILM COATED ORAL DAILY
Qty: 30 TABLET | Refills: 3 | Status: SHIPPED | OUTPATIENT
Start: 2019-08-08 | End: 2019-12-27

## 2019-08-08 RX ORDER — PROMETHAZINE HYDROCHLORIDE 25 MG/1
25 TABLET ORAL EVERY 6 HOURS PRN
Qty: 30 TABLET | Refills: 0 | Status: SHIPPED | OUTPATIENT
Start: 2019-08-08 | End: 2020-06-08 | Stop reason: SDUPTHER

## 2019-08-08 RX ORDER — FERROUS SULFATE 325(65) MG
325 TABLET ORAL
Qty: 30 TABLET | Refills: 3 | Status: SHIPPED | OUTPATIENT
Start: 2019-08-08 | End: 2019-12-27

## 2019-08-08 RX ORDER — NICOTINE 21 MG/24HR
1 PATCH, TRANSDERMAL 24 HOURS TRANSDERMAL EVERY 24 HOURS
Qty: 42 PATCH | Refills: 0 | Status: SHIPPED | OUTPATIENT
Start: 2019-08-08 | End: 2020-08-05 | Stop reason: SDUPTHER

## 2019-08-08 RX ORDER — CYCLOBENZAPRINE HCL 10 MG
10 TABLET ORAL 2 TIMES DAILY PRN
Qty: 60 TABLET | Refills: 2 | Status: SHIPPED | OUTPATIENT
Start: 2019-08-08 | End: 2019-11-18 | Stop reason: SDUPTHER

## 2019-08-08 RX ORDER — POTASSIUM CHLORIDE 750 MG/1
10 TABLET, FILM COATED, EXTENDED RELEASE ORAL NIGHTLY
Qty: 30 TABLET | Refills: 3 | Status: SHIPPED | OUTPATIENT
Start: 2019-08-08 | End: 2019-12-16 | Stop reason: SDUPTHER

## 2019-08-08 RX ORDER — NITROGLYCERIN 0.4 MG/1
0.4 TABLET SUBLINGUAL AS NEEDED
Qty: 30 TABLET | Refills: 3 | Status: SHIPPED | OUTPATIENT
Start: 2019-08-08 | End: 2020-08-05 | Stop reason: SDUPTHER

## 2019-08-08 NOTE — PROGRESS NOTES
"                    Subjective   Yamileth Slater is a 59 y.o. female who presents for follow up for DM, and GERD.    DM II  Diabetes is well controlled but hemoglobin A1c is slightly increasing from 7.1 and now 7.3.  She takes her medications as prescribed which consists of Jardiance, Victoza, metformin, and Lantus 95 units every 12 hours.  Her fasting and random glucose averages at about 115 and she denies any episodes of hypoglycemia.  She continues to try and have a healthy diet but has lately \"Let go\".  She denies any polyuria or polydipsia.  Numbness and tingling in hands and feet due to her neuropathy is currently stable on gabapentin.    GERD  She has a history of indigestion which was previously controlled on over-the-counter Nexium.  She has been having heartburn lately after eating fried, greasy foods like barbecued pork, and other foods like pasta with red sauce.  She usually has dinner at 8:30 in the evening and the reflux symptoms occur about 1:45 AM to 2:15 AM.  She started on some Protonix 40 mg once daily that she had leftover and has noted mild relief.    Past Medical Hx:  Past Medical History:   Diagnosis Date   • Anxiety states    • Asthma    • Carotid artery stenosis     DWIGHT 0-15%, LICA 0-15%. LICA stent 5/2014.   • Chronic folliculitis    • Chronic obstructive lung disease (CMS/HCC)    • Coronary arteriosclerosis    • Diabetes mellitus (CMS/HCC)     no retinopathy; A1C 9.1      • Dyslipidemia    • Essential hypertension    • Excoriated eczema     asteosis/keratosis   • GERD (gastroesophageal reflux disease)    • History of colon polyps    • Hypertensive disorder    • Kidney stone    • Mixed hyperlipidemia    • Morbid obesity due to excess calories (CMS/HCC)     BMI 44.5   • Neurologic disorder associated with diabetes mellitus (CMS/HCC)    • Non-healing surgical wound     LLE EVHa   • Peripheral vascular disease (CMS/HCC)    • Sleep apnea    • Tobacco dependence syndrome     1/2ppd      • " Type 2 diabetes mellitus (CMS/HCC)    • Vitamin D deficiency        Past Surgical Hx:  Past Surgical History:   Procedure Laterality Date   • CARDIAC CATHETERIZATION  08/09/2013    Severe multivessel CAD with critical lesions noted in the RCA, obtuse marginal two, ramus intermemdious, and LAD as described above. Normal LV systolic function with no wall motion abnormality.    • CARDIAC CATHETERIZATION  05/27/2014     LEFT Carotid Stent    • CAROTID STENT Left 05/27/2014   • COLONOSCOPY  05/02/2016    Normal colon.Diverticulosis in the sigmoid colon.No specimens collected.    • CORONARY ARTERY BYPASS GRAFT  11/15/2013    CABG x 3 with LIMA to LAD and coronary endarterectomy to LAD, SVG to Ramus intermedius branch and SVG to PDA.    • CYSTOSCOPY URETEROSCOPY LASER LITHOTRIPSY     • ENDOSCOPY  09/23/2015     Esophageal stricture present.Normal stomach.Normal duodenum.    • ENDOSCOPY  11/16/2015    w/ dilatation - Esophageal stricture present,Dilatation performed.Normal stomach and duodenum.    • HYSTERECTOMY     • INCISION AND DRAINAGE ABSCESS  01/02/2016    Incision and drainage of right perineal abscess.    • INCISION AND DRAINAGE ABSCESS  02/18/2014    Incision and Drainage of abscess of left lower leg    • OTHER SURGICAL HISTORY  01/25/2016    DESTRUCTION OF BENIGN LESION      • OTHER SURGICAL HISTORY  04/18/2014    ear/neck - L attempted CEA, Neck Dissection    • TUBAL ABDOMINAL LIGATION         Health Maintenance:  Health Maintenance   Topic Date Due   • MEDICARE ANNUAL WELLNESS  08/24/2016   • DIABETIC EYE EXAM  08/24/2016   • LUNG CANCER SCREENING  03/14/2017   • URINE MICROALBUMIN  10/05/2018   • MAMMOGRAM  01/04/2019   • LIPID PANEL  04/04/2019   • INFLUENZA VACCINE  08/01/2019   • ZOSTER VACCINE (1 of 2) 12/27/2019 (Originally 3/10/2009)   • HEMOGLOBIN A1C  09/28/2019   • DIABETIC FOOT EXAM  12/26/2019   • PAP SMEAR  01/04/2020   • COLONOSCOPY  05/02/2021   • TDAP/TD VACCINES (2 - Td) 11/10/2024   •  PNEUMOCOCCAL VACCINE (19-64 MEDIUM RISK)  Completed   • HEPATITIS C SCREENING  Completed       Current Meds:    Current Outpatient Medications:   •  albuterol (ACCUNEB) 0.63 MG/3ML nebulizer solution, Take 3 mL by nebulization Every 6 (Six) Hours As Needed for Wheezing., Disp: 20 vial, Rfl: 0  •  albuterol sulfate  (90 Base) MCG/ACT inhaler, Inhale 2 puffs Every 4 (Four) Hours As Needed for Wheezing., Disp: 1 inhaler, Rfl: 0  •  atorvastatin (LIPITOR) 20 MG tablet, Take 1 tablet by mouth Daily., Disp: 30 tablet, Rfl: 3  •  betamethasone valerate (VALISONE) 0.1 % cream, Apply  topically to the appropriate area as directed Daily., Disp: 45 g, Rfl: 2  •  Blood Glucose Monitoring Suppl ([employer] BLOOD GLUCOSE METER) W/DEVICE kit, 1 each 3 (Three) Times a Day., Disp: 1 kit, Rfl: 3  •  budesonide-formoterol (SYMBICORT) 160-4.5 MCG/ACT inhaler, Inhale 2 puffs 2 (Two) Times a Day., Disp: 10.2 g, Rfl: 6  •  cetirizine (zyrTEC) 10 MG tablet, Take 1 tablet by mouth Daily., Disp: 30 tablet, Rfl: 3  •  clopidogrel (PLAVIX) 75 MG tablet, TAKE 1 TABLET BY MOUTH DAILY., Disp: 30 tablet, Rfl: 5  •  cyclobenzaprine (FLEXERIL) 10 MG tablet, Take 1 tablet by mouth 2 (Two) Times a Day As Needed for Muscle Spasms., Disp: 60 tablet, Rfl: 2  •  Empagliflozin (JARDIANCE) 10 MG tablet, Take 10 mg by mouth Every Morning., Disp: 90 tablet, Rfl: 3  •  ferrous sulfate (FERROUSUL) 325 (65 FE) MG tablet, Take 1 tablet by mouth Daily With Breakfast., Disp: 30 tablet, Rfl: 3  •  furosemide (LASIX) 40 MG tablet, TAKE 1 TABLET BY MOUTH DAILY., Disp: 30 tablet, Rfl: 3  •  gabapentin (NEURONTIN) 800 MG tablet, Take 1 tablet by mouth 3 (Three) Times a Day. For neuropathy, Disp: 90 tablet, Rfl: 2  •  glucose blood test strip, Use as instructed, Disp: 100 each, Rfl: 12  •  HUMALOG KWIKPEN 100 UNIT/ML solution pen-injector, INJECT 45 UNITS UNDER THE SKIN 3 (THREE) TIMES A DAY BEFORE MEALS., Disp: 60 mL, Rfl: 11  •  Insulin Pen Needle (NOVOFINE) 30G X 8 MM  misc, As directed 4 times daily, Disp: 100 each, Rfl: 11  •  ipratropium-albuterol (DUO-NEB) 0.5-2.5 mg/3 ml nebulizer, Take 3 mL by nebulization 4 (Four) Times a Day. ICD10 code J96.01, Disp: 360 mL, Rfl: 0  •  LANTUS SOLOSTAR 100 UNIT/ML injection pen, Inject 95 Units under the skin into the appropriate area as directed Every 12 (Twelve) Hours., Disp: 3 mL, Rfl: 11  •  lidocaine (LIDODERM) 5 %, Place 1 patch on the skin as directed by provider Daily. Remove & Discard patch within 12 hours or as directed by MD, Disp: 15 each, Rfl: 0  •  lidocaine (XYLOCAINE) 5 % ointment, Apply  topically to the appropriate area as directed Every 2 (Two) Hours As Needed for Mild Pain ., Disp: 1 tube, Rfl: 0  •  Liraglutide (VICTOZA) 18 MG/3ML solution pen-injector injection, Inject 1.2 mg under the skin into the appropriate area as directed Every Morning., Disp: 9 mL, Rfl: 3  •  lisinopril (PRINIVIL,ZESTRIL) 10 MG tablet, TAKE 1 TABLET BY MOUTH DAILY., Disp: 30 tablet, Rfl: 5  •  metoprolol tartrate (LOPRESSOR) 50 MG tablet, Take 1 tablet by mouth Every 12 (Twelve) Hours., Disp: 60 tablet, Rfl: 3  •  montelukast (SINGULAIR) 10 MG tablet, Take 1 tablet by mouth Every Night., Disp: 30 tablet, Rfl: 3  •  naproxen (NAPROSYN) 500 MG tablet, Take 1 tablet by mouth 2 (Two) Times a Day With Meals., Disp: 60 tablet, Rfl: 3  •  nitroglycerin (NITROSTAT) 0.4 MG SL tablet, Place 1 tablet under the tongue As Needed for Chest Pain. Take no more than 3 doses in 15 minutes., Disp: 30 tablet, Rfl: 3  •  nystatin (MYCOSTATIN) 821489 UNIT/GM powder, Apply  topically to the appropriate area as directed 3 (Three) Times a Day., Disp: 15 g, Rfl: 1  •  pantoprazole (PROTONIX) 40 MG EC tablet, TAKE 1 TABLET BY MOUTH DAILY., Disp: 30 tablet, Rfl: 6  •  potassium chloride (K-DUR) 10 MEQ CR tablet, Take 1 tablet by mouth Every Night., Disp: 30 tablet, Rfl: 3  •  promethazine (PHENERGAN) 25 MG tablet, Take 1 tablet by mouth Every 6 (Six) Hours As Needed for  Nausea or Vomiting., Disp: 30 tablet, Rfl: 0  •  metFORMIN (GLUCOPHAGE) 1000 MG tablet, Take 1 tablet by mouth 2 (Two) Times a Day With Meals., Disp: 60 tablet, Rfl: 5  •  nicotine (NICODERM CQ) 21 MG/24HR patch, Place 1 patch on the skin as directed by provider Daily., Disp: 42 patch, Rfl: 0    Allergies:  Adhesive tape and Other    Family Hx:  Family History   Problem Relation Age of Onset   • Heart disease Mother    • Cancer Mother    • Heart disease Father    • Heart disease Sister    • Cancer Sister    • Heart disease Brother         Social History:  Social History     Socioeconomic History   • Marital status:      Spouse name: wesley dumont   • Number of children: Not on file   • Years of education: Not on file   • Highest education level: Not on file   Tobacco Use   • Smoking status: Current Every Day Smoker     Packs/day: 1.00     Years: 40.00     Pack years: 40.00     Types: Cigarettes   • Smokeless tobacco: Never Used   Substance and Sexual Activity   • Alcohol use: No   • Drug use: Yes     Types: LSD, Marijuana, Methamphetamines     Comment: Lasted used in 2006   • Sexual activity: Not Currently     Partners: Male       Review of Systems  Review of Systems   Constitutional: Negative for activity change and appetite change.   HENT: Negative for congestion and trouble swallowing.    Respiratory: Negative for chest tightness and shortness of breath.    Cardiovascular: Negative for chest pain and palpitations.   Gastrointestinal: Positive for abdominal pain. Negative for abdominal distention, nausea and vomiting.   Endocrine: Negative for polydipsia and polyuria.   Genitourinary: Negative for difficulty urinating and dysuria.   Musculoskeletal: Negative for arthralgias and myalgias.   Skin: Negative for color change and pallor.   Neurological: Negative for dizziness, weakness, light-headedness and headaches.   Psychiatric/Behavioral: Negative for agitation and behavioral problems.            Objective:  "    /72   Pulse 77   Ht 157.5 cm (62\")   Wt 131 kg (289 lb)   LMP  (LMP Unknown)   SpO2 95%   BMI 52.86 kg/m²       Physical Exam   Constitutional: She is oriented to person, place, and time. She appears well-developed and well-nourished. No distress.   HENT:   Head: Normocephalic and atraumatic.   Right Ear: External ear normal.   Left Ear: External ear normal.   Eyes: EOM are normal. Pupils are equal, round, and reactive to light.   Neck: Normal range of motion. Neck supple.   Cardiovascular: Normal rate, regular rhythm and normal heart sounds. Exam reveals no gallop and no friction rub.   No murmur heard.  Pulmonary/Chest: Effort normal and breath sounds normal. No respiratory distress. She has no wheezes. She has no rales.   Abdominal: Soft. Bowel sounds are normal. She exhibits no distension. There is no tenderness.   Musculoskeletal: Normal range of motion. She exhibits no edema.   Neurological: She is alert and oriented to person, place, and time.   Skin: Skin is warm. No erythema.   Psychiatric: She has a normal mood and affect. Her behavior is normal.       Assessment/Plan:     1. Uncontrolled type 2 diabetes mellitus with diabetic polyneuropathy, with long-term current use of insulin (CMS/[facility])   -Continue current medication regimen  -Repeat hemoglobin A1c  -Refill gabapentin.  Mamadou reviewed an appropriate, Reference #[id number]     2. Gastroesophageal reflux disease, esophagitis presence not specified   -Take Protonix 40 mg twice a day for 2 weeks then return to Protonix 40 mg once daily.  -Return to clinic if symptoms worsen     3. Smoking cessation  -Nicotine patches 21 mg for 6 weeks, then 14 mg for 2 weeks, then 7 mg for 2 weeks.        Follow-up:     Return in about 3 months (around 11/7/2019).  To recheck diabetes symptoms and efficacy of PPI in controlling GERD exacerbation.    Goals     • Fasting Blood Glucose       Barriers: compliance with diet      • Quit smoking / using " tobacco           Preventative:    Vaccines Recommended at this visit:   No Vaccines recommended today. Patient is up to date on all vaccines.     Vaccines Received at this visit:  No Vaccines recommended today. Patient is up to date on all vaccines.     Screenings Recommended at this visit:  None    Screenings Ordered at this visit:  None    Smoking Status:  Patient is current smoker. Patient is interested in smoking cessation. Smoking cessation counseling was provided. 7 of face to face time was spent counseling the patient. Pharmacotherapy was prescribed as ordered.    Alcohol Intake:  Patient does not drink    Patient's Body mass index is 52.86 kg/m². BMI is above normal parameters. Recommendations include: exercise counseling and nutrition counseling.         RISK SCORE: 3          This document has been electronically signed by Nirmal Calvillo MD on August 8, 2019 4:34 PM

## 2019-08-21 NOTE — PROGRESS NOTES
Subjective:     Yamileth Slater is a 59 y.o. female who presents for   Chief Complaint   Patient presents with   • Diabetes   • Cough     Pt comes in for f/u of Diabetes mellitus. Reports no hypoglycemic episodes since last visit. Admits to poor diet. DM neuropathy stable on gabapentin. Has been experiencing indigestion, helped by old rx of protonix.    For more detailed HPI, see resident note.            Past Medical Hx:  Past Medical History:   Diagnosis Date   • Anxiety states    • Asthma    • Carotid artery stenosis     DWIGHT 0-15%, LICA 0-15%. LICA stent 5/2014.   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 Normal LV systolic function with no wall motion abnormality.    • CARDIAC CATHETERIZATION  05/27/2014     LEFT Carotid Stent    • CAROTID STENT Left 05/27/2014   • COLONOSCOPY  05/02/2016    Normal colon.Diverticulosis in the sigmoid colon.No specimens collected.    • CORONARY ARTERY BYPASS GRAFT  11/15/2013    CABG x 3 with LIMA to LAD and coronary  endarterectomy to LAD, SVG to Ramus intermedius branch and SVG to PDA.    • CYSTOSCOPY URETEROSCOPY LASER LITHOTRIPSY     • ENDOSCOPY  09/23/2015     Esophageal stricture present.Normal stomach.Normal duodenum.    • ENDOSCOPY  11/16/2015    w/ dilatation - Esophageal stricture present,Dilatation performed.Normal stomach and duodenum.    • HYSTERECTOMY     • INCISION AND DRAINAGE ABSCESS  01/02/2016    Incision and drainage of right perineal abscess.    • INCISION AND DRAINAGE ABSCESS  02/18/2014    Incision and Drainage of abscess of left lower leg    • OTHER SURGICAL HISTORY  01/25/2016    DESTRUCTION OF BENIGN LESION      • OTHER SURGICAL HISTORY  04/18/2014    ear/neck - L attempted CEA, Neck Dissection    • TUBAL ABDOMINAL LIGATION         Health Maintenance:  Health Maintenance   Topic Date Due   • MEDICARE ANNUAL WELLNESS  08/24/2016   • DIABETIC EYE EXAM  08/24/2016   • LUNG CANCER SCREENING  03/14/2017   • URINE MICROALBUMIN  10/05/2018   • MAMMOGRAM  01/04/2019   • LIPID PANEL  04/04/2019   • INFLUENZA VACCINE  08/01/2019   • ZOSTER VACCINE (1 of 2) 12/27/2019 (Originally 3/10/2009)   • HEMOGLOBIN A1C  09/28/2019   • DIABETIC FOOT EXAM  12/26/2019   • PAP SMEAR  01/04/2020   • COLONOSCOPY  05/02/2021   • TDAP/TD VACCINES (2 - Td) 11/10/2024   • PNEUMOCOCCAL VACCINE (19-64 MEDIUM RISK)  Completed   • HEPATITIS C SCREENING  Completed       Current Meds:    Current Outpatient Medications:   •  albuterol (ACCUNEB) 0.63 MG/3ML nebulizer solution, Take 3 mL by nebulization Every 6 (Six) Hours As Needed for Wheezing., Disp: 20 vial, Rfl: 0  •  albuterol sulfate  (90 Base) MCG/ACT inhaler, Inhale 2 puffs Every 4 (Four) Hours As Needed for Wheezing., Disp: 1 inhaler, Rfl: 0  •  atorvastatin (LIPITOR) 20 MG tablet, Take 1 tablet by mouth Daily., Disp: 30 tablet, Rfl: 3  •  betamethasone valerate (VALISONE) 0.1 % cream, Apply  topically to the appropriate area as directed Daily., Disp: 45 g, Rfl: 2  •  Blood  Glucose Monitoring Suppl ([employer] BLOOD GLUCOSE METER) W/DEVICE kit, 1 each 3 (Three) Times a Day., Disp: 1 kit, Rfl: 3  •  budesonide-formoterol (SYMBICORT) 160-4.5 MCG/ACT inhaler, Inhale 2 puffs 2 (Two) Times a Day., Disp: 10.2 g, Rfl: 6  •  cetirizine (zyrTEC) 10 MG tablet, Take 1 tablet by mouth Daily., Disp: 30 tablet, Rfl: 3  •  clopidogrel (PLAVIX) 75 MG tablet, TAKE 1 TABLET BY MOUTH DAILY., Disp: 30 tablet, Rfl: 5  •  cyclobenzaprine (FLEXERIL) 10 MG tablet, Take 1 tablet by mouth 2 (Two) Times a Day As Needed for Muscle Spasms., Disp: 60 tablet, Rfl: 2  •  Empagliflozin (JARDIANCE) 10 MG tablet, Take 10 mg by mouth Every Morning., Disp: 90 tablet, Rfl: 3  •  ferrous sulfate (FERROUSUL) 325 (65 FE) MG tablet, Take 1 tablet by mouth Daily With Breakfast., Disp: 30 tablet, Rfl: 3  •  furosemide (LASIX) 40 MG tablet, TAKE 1 TABLET BY MOUTH DAILY., Disp: 30 tablet, Rfl: 3  •  gabapentin (NEURONTIN) 800 MG tablet, Take 1 tablet by mouth 3 (Three) Times a Day. For neuropathy, Disp: 90 tablet, Rfl: 2  •  glucose blood test strip, Use as instructed, Disp: 100 each, Rfl: 12  •  HUMALOG KWIKPEN 100 UNIT/ML solution pen-injector, INJECT 45 UNITS UNDER THE SKIN 3 (THREE) TIMES A DAY BEFORE MEALS., Disp: 60 mL, Rfl: 11  •  Insulin Pen Needle (NOVOFINE) 30G X 8 MM misc, As directed 4 times daily, Disp: 100 each, Rfl: 11  •  ipratropium-albuterol (DUO-NEB) 0.5-2.5 mg/3 ml nebulizer, Take 3 mL by nebulization 4 (Four) Times a Day. ICD10 code J96.01, Disp: 360 mL, Rfl: 0  •  LANTUS SOLOSTAR 100 UNIT/ML injection pen, Inject 95 Units under the skin into the appropriate area as directed Every 12 (Twelve) Hours., Disp: 3 mL, Rfl: 11  •  lidocaine (LIDODERM) 5 %, Place 1 patch on the skin as directed by provider Daily. Remove & Discard patch within 12 hours or as directed by MD, Disp: 15 each, Rfl: 0  •  lidocaine (XYLOCAINE) 5 % ointment, Apply  topically to the appropriate area as directed Every 2 (Two) Hours As Needed for Mild  Pain ., Disp: 1 tube, Rfl: 0  •  Liraglutide (VICTOZA) 18 MG/3ML solution pen-injector injection, Inject 1.2 mg under the skin into the appropriate area as directed Every Morning., Disp: 9 mL, Rfl: 3  •  lisinopril (PRINIVIL,ZESTRIL) 10 MG tablet, TAKE 1 TABLET BY MOUTH DAILY., Disp: 30 tablet, Rfl: 5  •  metoprolol tartrate (LOPRESSOR) 50 MG tablet, Take 1 tablet by mouth Every 12 (Twelve) Hours., Disp: 60 tablet, Rfl: 3  •  montelukast (SINGULAIR) 10 MG tablet, Take 1 tablet by mouth Every Night., Disp: 30 tablet, Rfl: 3  •  naproxen (NAPROSYN) 500 MG tablet, Take 1 tablet by mouth 2 (Two) Times a Day With Meals., Disp: 60 tablet, Rfl: 3  •  nitroglycerin (NITROSTAT) 0.4 MG SL tablet, Place 1 tablet under the tongue As Needed for Chest Pain. Take no more than 3 doses in 15 minutes., Disp: 30 tablet, Rfl: 3  •  nystatin (MYCOSTATIN) 471439 UNIT/GM powder, Apply  topically to the appropriate area as directed 3 (Three) Times a Day., Disp: 15 g, Rfl: 1  •  pantoprazole (PROTONIX) 40 MG EC tablet, TAKE 1 TABLET BY MOUTH DAILY., Disp: 30 tablet, Rfl: 6  •  potassium chloride (K-DUR) 10 MEQ CR tablet, Take 1 tablet by mouth Every Night., Disp: 30 tablet, Rfl: 3  •  promethazine (PHENERGAN) 25 MG tablet, Take 1 tablet by mouth Every 6 (Six) Hours As Needed for Nausea or Vomiting., Disp: 30 tablet, Rfl: 0  •  metFORMIN (GLUCOPHAGE) 1000 MG tablet, Take 1 tablet by mouth 2 (Two) Times a Day With Meals., Disp: 60 tablet, Rfl: 5  •  nicotine (NICODERM CQ) 21 MG/24HR patch, Place 1 patch on the skin as directed by provider Daily., Disp: 42 patch, Rfl: 0    Allergies:  Adhesive tape and Other    Family Hx:  Family History   Problem Relation Age of Onset   • Heart disease Mother    • Cancer Mother    • Heart disease Father    • Heart disease Sister    • Cancer Sister    • Heart disease Brother         Social History:  Social History     Socioeconomic History   • Marital status:      Spouse name: wesley dumont   • Number of  "children: Not on file   • Years of education: Not on file   • Highest education level: Not on file   Tobacco Use   • Smoking status: Current Every Day Smoker     Packs/day: 1.00     Years: 40.00     Pack years: 40.00     Types: Cigarettes   • Smokeless tobacco: Never Used   Substance and Sexual Activity   • Alcohol use: No   • Drug use: Yes     Types: LSD, Marijuana, Methamphetamines     Comment: Lasted used in 2006   • Sexual activity: Not Currently     Partners: Male       Review of Systems  Review of Systems   Constitutional: Negative.    HENT: Negative.    Respiratory: Positive for cough. Negative for shortness of breath and wheezing.    Cardiovascular: Negative.    Gastrointestinal: Negative for abdominal pain.        Heartburn   Neurological: Negative for weakness and numbness.       Objective:     /72   Pulse 77   Ht 157.5 cm (62\")   Wt 131 kg (289 lb)   LMP  (LMP Unknown)   SpO2 95%   BMI 52.86 kg/m²   Physical Exam   Constitutional: She is oriented to person, place, and time. She appears well-developed and well-nourished.   HENT:   Head: Normocephalic and atraumatic.   Eyes: EOM are normal. Pupils are equal, round, and reactive to light.   Cardiovascular: Normal rate, regular rhythm, normal heart sounds and intact distal pulses.   No murmur heard.  Pulmonary/Chest: Effort normal and breath sounds normal. No respiratory distress. She has no wheezes.   Abdominal: Soft. Bowel sounds are normal. There is no tenderness.   Neurological: She is alert and oriented to person, place, and time.       Lab Review  Results for orders placed or performed in visit on 03/28/19   Urine Culture - Urine, Urine, Clean Catch   Result Value Ref Range    Urine Culture 50,000 CFU/mL Escherichia coli (A)    Urinalysis With Culture If Indicated - Urine, Clean Catch   Result Value Ref Range    Color, UA Yellow Yellow, Straw    Appearance, UA Clear Clear    pH, UA 5.5 5.0 - 8.0    Specific Gravity, UA 1.015 1.005 - 1.030    " Glucose, UA Negative Negative    Ketones, UA Negative Negative    Bilirubin, UA Negative Negative    Blood, UA Negative Negative    Protein, UA >=300 mg/dL (3+) (A) Negative    Leuk Esterase, UA Small (1+) (A) Negative    Nitrite, UA Negative Negative    Urobilinogen, UA 0.2 E.U./dL 0.2 - 1.0 E.U./dL   Hemoglobin A1c   Result Value Ref Range    Hemoglobin A1C 7.32 (H) 4.80 - 5.60 %   Basic Metabolic Panel   Result Value Ref Range    Glucose 112 (H) 65 - 99 mg/dL    BUN 24 (H) 8 - 23 mg/dL    Creatinine 1.36 (H) 0.57 - 1.00 mg/dL    Sodium 139 136 - 145 mmol/L    Potassium 4.0 3.5 - 5.2 mmol/L    Chloride 98 98 - 107 mmol/L    CO2 25.1 22.0 - 29.0 mmol/L    Calcium 8.3 (L) 8.6 - 10.5 mg/dL    eGFR Non African Amer 40 (L) >60 mL/min/1.73    BUN/Creatinine Ratio 17.6 7.0 - 25.0    Anion Gap 15.9 mmol/L   Urinalysis, Microscopic Only - Urine, Clean Catch   Result Value Ref Range    RBC, UA 0-2 None Seen, 0-2 /HPF    WBC, UA Too Numerous to Count (A) None Seen, 0-2 /HPF    Bacteria, UA None Seen None Seen /HPF    Squamous Epithelial Cells, UA 3-6 (A) None Seen, 0-2 /HPF    Hyaline Casts, UA 3-6 None Seen /LPF    Methodology Automated Microscopy    POCT urinalysis dipstick, automated   Result Value Ref Range    Color Yellow Yellow, Straw, Dark Yellow, Jennifer    Clarity, UA Cloudy (A) Clear    Specific Gravity  1.015 1.005 - 1.030    pH, Urine 5.5 5.0 - 8.0    Leukocytes Small (1+) (A) Negative    Nitrite, UA Negative Negative    Protein, POC 3+ (A) Negative mg/dL    Glucose, UA Negative Negative, 1000 mg/dL (3+) mg/dL    Ketones, UA Negative Negative    Urobilinogen, UA Normal Normal    Bilirubin Negative Negative    Blood, UA Trace (A) Negative            Assessment:     Yamileth was seen today for diabetes and cough.    Diagnoses and all orders for this visit:    Uncontrolled type 2 diabetes mellitus with diabetic polyneuropathy, with long-term current use of insulin (CMS/[facility])  -     gabapentin (NEURONTIN) 800 MG tablet;  Take 1 tablet by mouth 3 (Three) Times a Day. For neuropathy  -     Hemoglobin A1c    Gastroesophageal reflux disease, esophagitis presence not specified    Encounter for smoking cessation counseling    Uncontrolled type 2 diabetes mellitus with complication, with long-term current use of insulin (CMS/[facility])  -     Empagliflozin (JARDIANCE) 10 MG tablet; Take 10 mg by mouth Every Morning.  -     metFORMIN (GLUCOPHAGE) 1000 MG tablet; Take 1 tablet by mouth 2 (Two) Times a Day With Meals.  -     Liraglutide (VICTOZA) 18 MG/3ML solution pen-injector injection; Inject 1.2 mg under the skin into the appropriate area as directed Every Morning.    Other iron deficiency anemia  -     ferrous sulfate (FERROUSUL) 325 (65 FE) MG tablet; Take 1 tablet by mouth Daily With Breakfast.    Chronic obstructive pulmonary disease with acute lower respiratory infection (CMS/[facility])  -     montelukast (SINGULAIR) 10 MG tablet; Take 1 tablet by mouth Every Night.    Pain  -     naproxen (NAPROSYN) 500 MG tablet; Take 1 tablet by mouth 2 (Two) Times a Day With Meals.    Other orders  -     nicotine (NICODERM CQ) 21 MG/24HR patch; Place 1 patch on the skin as directed by provider Daily.  -     atorvastatin (LIPITOR) 20 MG tablet; Take 1 tablet by mouth Daily.  -     promethazine (PHENERGAN) 25 MG tablet; Take 1 tablet by mouth Every 6 (Six) Hours As Needed for Nausea or Vomiting.  -     potassium chloride (K-DUR) 10 MEQ CR tablet; Take 1 tablet by mouth Every Night.  -     metoprolol tartrate (LOPRESSOR) 50 MG tablet; Take 1 tablet by mouth Every 12 (Twelve) Hours.  -     cyclobenzaprine (FLEXERIL) 10 MG tablet; Take 1 tablet by mouth 2 (Two) Times a Day As Needed for Muscle Spasms.  -     nitroglycerin (NITROSTAT) 0.4 MG SL tablet; Place 1 tablet under the tongue As Needed for Chest Pain. Take no more than 3 doses in 15 minutes.  -     LANTUS SOLOSTAR 100 UNIT/ML injection pen; Inject 95 Units under the skin into the appropriate area as  directed Every 12 (Twelve) Hours.        Plan:     I have seen and examined the patient.  I have reviewed the notes, assessments, and/or procedures performed by Nirmal Calvillo MD, I concur with her/his documentation and assessment and plan for Yamileth Slater.            This document has been electronically signed by Hai Hendrickson MD on August 21, 2019 2:27 PM

## 2019-09-27 ENCOUNTER — TELEPHONE (OUTPATIENT)
Dept: FAMILY MEDICINE CLINIC | Facility: CLINIC | Age: 59
End: 2019-09-27

## 2019-09-27 NOTE — PROGRESS NOTES
FAMILY MEDICINE DAILY PROGRESS NOTE  NAME: Yamileth Slater  : 1960  MRN: [id number]     LOS: 2 days     PROVIDER OF SERVICE: Yadira Delarosa MD    Chief Complaint: Sepsis due to Gram negative bacteria    Subjective:     Interval History:  History taken from: patient  Patient reports no further falls, she worked well with physical therapy yesterday with no problems.  She denies dizziness, lightheadedness upon standing/ambulation.     Review of Systems:   Review of Systems   Constitutional: Negative for activity change, appetite change, chills, fatigue and fever.   HENT: Negative for congestion, postnasal drip, rhinorrhea and sinus pressure.    Eyes: Negative for photophobia and visual disturbance.   Respiratory: Negative for cough, chest tightness, shortness of breath and wheezing.    Cardiovascular: Negative for chest pain and palpitations.   Gastrointestinal: Negative for abdominal pain, blood in stool, constipation, diarrhea, nausea and vomiting.   Endocrine: Negative for polydipsia, polyphagia and polyuria.   Genitourinary: Negative for difficulty urinating, dysuria and urgency.   Musculoskeletal: Negative for joint swelling, myalgias and neck pain.   Skin: Negative for color change and rash.   Neurological: Positive for weakness. Negative for dizziness, facial asymmetry, speech difficulty, light-headedness and headaches.   Psychiatric/Behavioral: Negative for decreased concentration. The patient is not nervous/anxious.        Objective:     Vital Signs  Temp:  [96.6 °F (35.9 °C)-98.5 °F (36.9 °C)] 97.8 °F (36.6 °C)  Heart Rate:  [] 82  Resp:  [18-20] 18  BP: (115-142)/(58-88) 142/74   Body mass index is 51.49 kg/(m^2).    Physical Exam  Physical Exam   Constitutional: She is oriented to person, place, and time. She appears well-developed and well-nourished.   HENT:   Head: Normocephalic and atraumatic.   Nose: Nose normal.   Mouth/Throat: Oropharynx is clear and moist.   Eyes:  Conjunctivae and EOM are normal. Pupils are equal, round, and reactive to light.   Neck: Normal range of motion. Neck supple.   Cardiovascular: Normal rate, regular rhythm, normal heart sounds and intact distal pulses.    Pulmonary/Chest: Effort normal. No respiratory distress. She has decreased breath sounds. She has no wheezes. She has no rales.   Abdominal: Soft. Bowel sounds are normal. She exhibits no distension. There is no tenderness.   Musculoskeletal: Normal range of motion.   Neurological: She is alert and oriented to person, place, and time.   Skin: Skin is warm and dry.   Psychiatric: She has a normal mood and affect. Her behavior is normal.   Vitals reviewed.      Medication Review    Current Facility-Administered Medications:   •  atorvastatin (LIPITOR) tablet 20 mg, 20 mg, Oral, Daily, Marci Jimenez MD, 20 mg at 04/17/17 0940  •  budesonide-formoterol (SYMBICORT) 160-4.5 MCG/ACT inhaler 2 puff, 2 puff, Inhalation, BID - RT, Marci Jimenez MD  •  cefTRIAXone (ROCEPHIN) 1 g/100 mL 0.9% NS (MBP), 1 g, Intravenous, Q24H, Marci Jimenez MD, 1 g at 04/17/17 0936  •  cetirizine (zyrTEC) tablet 5 mg, 5 mg, Oral, Daily, Marci Jimenez MD, 5 mg at 04/17/17 0939  •  clobetasol (TEMOVATE) 0.05 % cream, , Topical, Q12H, Marci Jimenez MD  •  clopidogrel (PLAVIX) tablet 75 mg, 75 mg, Oral, Daily, Marci Jimenez MD, 75 mg at 04/17/17 0939  •  cyclobenzaprine (FLEXERIL) tablet 10 mg, 10 mg, Oral, BID, Marci Jimenez MD, 10 mg at 04/17/17 1725  •  enoxaparin (LOVENOX) syringe 40 mg, 40 mg, Subcutaneous, Daily, Yadira Delarosa MD  •  ferrous sulfate EC tablet 324 mg, 324 mg, Oral, Daily With Breakfast, Marci Jimenez MD, 324 mg at 04/17/17 0913  •  gabapentin (NEURONTIN) capsule 800 mg, 800 mg, Oral, TID, Marci Jimenez MD, 800 mg at 04/17/17 2106  •  HYDROcodone-acetaminophen (NORCO) 7.5-325 MG per tablet 1 tablet, 1 tablet, Oral, Q6H PRN, Marci Garcia  27-Sep-2019 23:50 MD Barbara, 1 tablet at 04/17/17 1109  •  insulin aspart (novoLOG) injection 45 Units, 45 Units, Subcutaneous, TID With Meals, Marci Jimenez MD, 45 Units at 04/17/17 1730  •  insulin detemir (LEVEMIR) injection 95 Units, 95 Units, Subcutaneous, Q12H, Yadira Delarosa MD, 95 Units at 04/17/17 0957  •  ipratropium-albuterol (DUO-NEB) nebulizer solution 3 mL, 3 mL, Nebulization, 4x Daily, Marci Jimenez MD, 3 mL at 04/17/17 2015  •  metoprolol tartrate (LOPRESSOR) tablet 50 mg, 50 mg, Oral, BID, Marci Jimenez MD, 50 mg at 04/17/17 1725  •  montelukast (SINGULAIR) tablet 10 mg, 10 mg, Oral, Nightly, Marci Jimenez MD, 10 mg at 04/17/17 2106  •  nitroglycerin (NITROSTAT) SL tablet 0.4 mg, 0.4 mg, Sublingual, PRN, Marci Jimenez MD  •  nitroglycerin (NITROSTAT) SL tablet 0.4 mg, 0.4 mg, Sublingual, Q5 Min PRN, Aminata Vasquez MD  •  pantoprazole (PROTONIX) EC tablet 40 mg, 40 mg, Oral, Daily, Marci Jimenez MD, 40 mg at 04/17/17 0939  •  promethazine (PHENERGAN) tablet 25 mg, 25 mg, Oral, Q6H PRN, Marci Jimenez MD, 25 mg at 04/17/17 1225  •  sodium chloride 0.9 % flush 1-10 mL, 1-10 mL, Intravenous, PRN, Marci Jimenez MD  •  sodium chloride 0.9 % flush 10 mL, 10 mL, Intravenous, PRN, Angel Hernandez MD  •  sodium chloride 0.9 % infusion, 125 mL/hr, Intravenous, Continuous, Angel Hernandez MD, Last Rate: 125 mL/hr at 04/16/17 1656, 125 mL/hr at 04/16/17 1656  •  sodium chloride 0.9 % infusion, 125 mL/hr, Intravenous, Continuous, Marci Jimenez MD, Last Rate: 125 mL/hr at 04/17/17 0850, 125 mL/hr at 04/17/17 0850     Diagnostic Data    Lab Results (last 24 hours)     Procedure Component Value Units Date/Time    C-reactive Protein [[id number]]  (Abnormal) Collected:  04/17/17 0556    Specimen:  Blood Updated:  04/17/17 0741     C-Reactive Protein 5.40 (H) mg/dL     T4, Free [[id number]]  (Normal) Collected:  04/16/17 0938    Specimen:  Blood Updated:  04/17/17  0756     Free T4 1.83 ng/dL     Lactate Acid, Reflex [[id number]]  (Abnormal) Collected:  04/17/17 0810    Specimen:  Blood Updated:  04/17/17 0837     Lactate 3.4 (C) mmol/L     Blood Culture [[id number]]  (Normal) Collected:  04/16/17 0938    Specimen:  Blood from Arm, Right Updated:  04/17/17 1001     Blood Culture No growth at 24 hours    Blood Culture [[id number]]  (Normal) Collected:  04/16/17 0938    Specimen:  Blood from Arm, Left Updated:  04/17/17 1001     Blood Culture No growth at 24 hours    Troponin [[id number]]  (Normal) Collected:  04/17/17 1002    Specimen:  Blood Updated:  04/17/17 1035     Troponin I <0.012 ng/mL     POC Glucose Fingerstick [[id number]]  (Abnormal) Collected:  04/17/17 1043    Specimen:  Blood Updated:  04/17/17 1055     Glucose 355 (H) mg/dL       RN NotifiedMeter: [id number]: [id number] CARLOS ENRIQUE OLIVA       Troponin [[id number]]  (Normal) Collected:  04/17/17 1251    Specimen:  Blood Updated:  04/17/17 1344     Troponin I 0.028 ng/mL     POC Glucose Fingerstick [[id number]]  (Abnormal) Collected:  04/17/17 1612    Specimen:  Blood Updated:  04/17/17 1626     Glucose 215 (H) mg/dL       RN NotifiedMeter: [id number]: [id number] CARLOS ENRIQUE OLIVA       Troponin [[id number]]  (Abnormal) Collected:  04/17/17 1513    Specimen:  Blood Updated:  04/17/17 1639     Troponin I 0.052 (H) ng/mL     POC Glucose Fingerstick [[id number]]  (Abnormal) Collected:  04/17/17 2050    Specimen:  Blood Updated:  04/17/17 2129     Glucose 148 (H) mg/dL       RN NotifiedMeter: [id number]: [id number] LEATHA CADET       Blood Culture [[id number]]  (Normal) Collected:  04/16/17 0041    Specimen:  Blood from Arm, Left Updated:  04/18/17 0103     Blood Culture No growth at 2 days    Blood Culture [[id number]]  (Normal) Collected:  04/16/17 0230    Specimen:  Blood from Arm, Right Updated:  04/18/17 0301     Blood Culture No growth at 2 days    Lactic Acid, Plasma [[id number]]  (Normal) Collected:   04/18/17 0612    Specimen:  Blood Updated:  04/18/17 0637     Lactate 1.4 mmol/L     Comprehensive Metabolic Panel [[id number]]  (Abnormal) Collected:  04/18/17 0612    Specimen:  Blood Updated:  04/18/17 0648     Glucose 133 (H) mg/dL      BUN 16 mg/dL      Creatinine 0.86 mg/dL      Sodium 140 mmol/L      Potassium 4.4 mmol/L      Chloride 108 mmol/L      CO2 24.0 mmol/L      Calcium 8.4 mg/dL      Total Protein 6.4 g/dL      Albumin 3.20 (L) g/dL      ALT (SGPT) 22 U/L      AST (SGOT) 29 U/L      Alkaline Phosphatase 129 (H) U/L      Total Bilirubin 0.3 mg/dL      eGFR Non African Amer 68 mL/min/1.73      Globulin 3.2 gm/dL      A/G Ratio 1.0 (L) g/dL      BUN/Creatinine Ratio 18.6     Anion Gap 8.0 mmol/L     POC Glucose Fingerstick [[id number]]  (Abnormal) Collected:  04/18/17 0635    Specimen:  Blood Updated:  04/18/17 0656     Glucose 147 (H) mg/dL       RN NotifiedMeter: [id number]: [id number] LEATHA CADET       CBC (No Diff) [[id number]]  (Abnormal) Collected:  04/18/17 0612    Specimen:  Blood Updated:  04/18/17 0656     WBC 5.39 10*3/mm3      RBC 3.75 (L) 10*6/mm3      Hemoglobin 8.5 (L) g/dL      Hematocrit 28.4 (L) %      MCV 75.7 (L) fL      MCH 22.7 (L) pg      MCHC 29.9 (L) g/dL      RDW 19.3 (H) %      RDW-SD 52.8 (H) fl      MPV 8.4 fL      Platelets 275 10*3/mm3         I reviewed the patient's new clinical results.    Assessment/Plan:     Active Hospital Problems (** Indicates Principal Problem)    Diagnosis Date Noted   • **Sepsis due to Gram negative bacteria [A41.50] 04/16/2017     Priority: High     Urine culture showed >100,000 CFU/mL Gram Negative Bacilli (A) - species pending  -Lactic Acid: 3.3 ->2.2->3.4->1.4 today  -Continue IV fluids   - Rocephin day 3         • Bacterial cystitis - gram negative [N30.80] 04/17/2017     Priority: Medium     Urine culture showed >100,000 CFU/mL Gram Negative Bacilli (A) - species pending  - Rocephin day 3     • Fall [W19.XXXA] 04/16/2017     - No  LOC, continue telemetry  - CT of head negative  - Unable to get MRI Brain without contrast at our facility because patient does not fit in machine, will order as outpatient   - ECHO - read pending  - Carotid u/s: Proximal left internal carotid artery soft atherosclerotic  plaque causing 50-69% diameter stenosis.     • Acute kidney injury [N17.9] 04/16/2017     -Continue IV fluids  -Admission Creatinine 1.67-> 1.07-> 0.86  -Hold nephrotoxic drugs      • Anemia [D64.9] 04/16/2017     -Chronic  -B12, foalte normal,  low iron, low ferritin, normal TIBC  - iron suplementation     • Hypothyroidism [E03.9] 04/16/2017     -TSH is 5.280  -fT4 1.83 (within normal range)       • Diabetes mellitus type 2 in obese [E11.9, E66.9] 08/24/2016     -Insulin sliding scale   - Novolog 45u TID with meals  - Levemir 95u q12h  - A1C 12.86         • Chronic obstructive pulmonary disease [J44.9] 08/24/2016     - Duo Nebs, Symbicort, PRN oxygen supplementation     • Morbid obesity with BMI of 50.0-59.9, adult [E66.01, Z68.43]      BMI 51.2, dietary consult ordered     • Carotid artery stenosis [I65.29]      Previously DWIGHT 0-15%, LICA 0-15%. LICA stent 5/2014.  - Repeat Carotid u/s 4/17/17: Proximal left internal carotid artery soft atherosclerotic plaque causing 50-69% diameter stenosis.     • MIKE on CPAP [G47.33]      CPAP ordered        Resolved Hospital Problems    Diagnosis Date Noted Date Resolved   • Hypotension [I95.9] 04/16/2017 04/18/2017     - continue IV fluids   -Current SBP is in 113          DVT prophylaxis: Lovenox  Code status is Full Code    Plan for disposition:Home with home health sepsis protocol when appropriate    Signature  Yadira Delarosa MD PGY2  [place] [address]  Office: [phone]    This document has been electronically signed by Yadira Delarosa MD on April 18, 2017 6:56 AM

## 2019-11-06 ENCOUNTER — TELEPHONE (OUTPATIENT)
Dept: FAMILY MEDICINE CLINIC | Facility: CLINIC | Age: 59
End: 2019-11-06

## 2019-11-06 NOTE — TELEPHONE ENCOUNTER
LEFT MESSAGE FOR PATIENT TO MAKE AN APPOINTMENT IN REGARDS TO DIABETIC SHOES.     LEFT MESSAGE

## 2019-11-15 ENCOUNTER — TELEPHONE (OUTPATIENT)
Dept: FAMILY MEDICINE CLINIC | Facility: CLINIC | Age: 59
End: 2019-11-15

## 2019-11-15 NOTE — TELEPHONE ENCOUNTER
Called patient to set up an appointment for a diabetic foot exam.     Patient does not have reliable transportation at this time and will call back when she does so that her  and she can get an appointment.

## 2019-11-19 DIAGNOSIS — J44.9 CHRONIC OBSTRUCTIVE PULMONARY DISEASE, UNSPECIFIED COPD TYPE (HCC): ICD-10-CM

## 2019-11-19 RX ORDER — BUDESONIDE AND FORMOTEROL FUMARATE DIHYDRATE 160; 4.5 UG/1; UG/1
AEROSOL RESPIRATORY (INHALATION)
Qty: 10.2 G | Refills: 6 | Status: ON HOLD | OUTPATIENT
Start: 2019-11-19 | End: 2020-07-14

## 2019-11-19 RX ORDER — CYCLOBENZAPRINE HCL 10 MG
10 TABLET ORAL 2 TIMES DAILY PRN
Qty: 60 TABLET | Refills: 5 | Status: SHIPPED | OUTPATIENT
Start: 2019-11-19 | End: 2020-06-08 | Stop reason: SDUPTHER

## 2019-12-10 ENCOUNTER — OFFICE VISIT (OUTPATIENT)
Dept: FAMILY MEDICINE CLINIC | Facility: CLINIC | Age: 59
End: 2019-12-10

## 2019-12-10 VITALS
SYSTOLIC BLOOD PRESSURE: 128 MMHG | TEMPERATURE: 96.8 F | BODY MASS INDEX: 52.44 KG/M2 | WEIGHT: 285 LBS | OXYGEN SATURATION: 96 % | HEIGHT: 62 IN | HEART RATE: 92 BPM | DIASTOLIC BLOOD PRESSURE: 80 MMHG

## 2019-12-10 DIAGNOSIS — IMO0002 UNCONTROLLED TYPE 2 DIABETES MELLITUS WITH DIABETIC POLYNEUROPATHY, WITH LONG-TERM CURRENT USE OF INSULIN: Primary | ICD-10-CM

## 2019-12-10 DIAGNOSIS — Z12.2 ENCOUNTER FOR SCREENING FOR LUNG CANCER: ICD-10-CM

## 2019-12-10 DIAGNOSIS — Z87.891 PERSONAL HISTORY OF NICOTINE DEPENDENCE: ICD-10-CM

## 2019-12-10 DIAGNOSIS — Z51.81 MEDICATION MONITORING ENCOUNTER: ICD-10-CM

## 2019-12-10 DIAGNOSIS — F17.200 TOBACCO DEPENDENCE SYNDROME: ICD-10-CM

## 2019-12-10 DIAGNOSIS — Z79.4 TYPE 2 DIABETES MELLITUS WITH DIABETIC POLYNEUROPATHY, WITH LONG-TERM CURRENT USE OF INSULIN (HCC): ICD-10-CM

## 2019-12-10 DIAGNOSIS — I10 ESSENTIAL HYPERTENSION: ICD-10-CM

## 2019-12-10 DIAGNOSIS — E66.01 MORBID OBESITY WITH BMI OF 50.0-59.9, ADULT (HCC): ICD-10-CM

## 2019-12-10 DIAGNOSIS — E11.42 TYPE 2 DIABETES MELLITUS WITH DIABETIC POLYNEUROPATHY, WITH LONG-TERM CURRENT USE OF INSULIN (HCC): ICD-10-CM

## 2019-12-10 DIAGNOSIS — Z23 NEED FOR VACCINATION WITH 13-POLYVALENT PNEUMOCOCCAL CONJUGATE VACCINE: ICD-10-CM

## 2019-12-10 DIAGNOSIS — K52.9 GASTROENTERITIS: ICD-10-CM

## 2019-12-10 DIAGNOSIS — Z23 FLU VACCINE NEED: ICD-10-CM

## 2019-12-10 DIAGNOSIS — E11.42 DM TYPE 2 WITH DIABETIC PERIPHERAL NEUROPATHY (HCC): ICD-10-CM

## 2019-12-10 DIAGNOSIS — Z12.31 BREAST CANCER SCREENING BY MAMMOGRAM: ICD-10-CM

## 2019-12-10 PROCEDURE — 90686 IIV4 VACC NO PRSV 0.5 ML IM: CPT | Performed by: STUDENT IN AN ORGANIZED HEALTH CARE EDUCATION/TRAINING PROGRAM

## 2019-12-10 PROCEDURE — 99214 OFFICE O/P EST MOD 30 MIN: CPT | Performed by: STUDENT IN AN ORGANIZED HEALTH CARE EDUCATION/TRAINING PROGRAM

## 2019-12-10 PROCEDURE — G0008 ADMIN INFLUENZA VIRUS VAC: HCPCS | Performed by: STUDENT IN AN ORGANIZED HEALTH CARE EDUCATION/TRAINING PROGRAM

## 2019-12-10 RX ORDER — GABAPENTIN 800 MG/1
800 TABLET ORAL 3 TIMES DAILY
Qty: 90 TABLET | Refills: 2 | Status: SHIPPED | OUTPATIENT
Start: 2019-12-10 | End: 2020-03-19 | Stop reason: SDUPTHER

## 2019-12-11 ENCOUNTER — APPOINTMENT (OUTPATIENT)
Dept: LAB | Facility: HOSPITAL | Age: 59
End: 2019-12-11

## 2019-12-11 LAB
AMPHET+METHAMPHET UR QL: NEGATIVE
AMPHETAMINES UR QL: NEGATIVE
BARBITURATES UR QL SCN: NEGATIVE
BENZODIAZ UR QL SCN: NEGATIVE
BUPRENORPHINE SERPL-MCNC: NEGATIVE NG/ML
CANNABINOIDS SERPL QL: POSITIVE
COCAINE UR QL: NEGATIVE
METHADONE UR QL SCN: NEGATIVE
OPIATES UR QL: NEGATIVE
OXYCODONE UR QL SCN: NEGATIVE
PCP UR QL SCN: NEGATIVE
PROPOXYPH UR QL: NEGATIVE
TRICYCLICS UR QL SCN: NEGATIVE

## 2019-12-11 PROCEDURE — 80307 DRUG TEST PRSMV CHEM ANLYZR: CPT | Performed by: STUDENT IN AN ORGANIZED HEALTH CARE EDUCATION/TRAINING PROGRAM

## 2019-12-11 RX ORDER — ONDANSETRON 4 MG/1
4 TABLET, ORALLY DISINTEGRATING ORAL EVERY 12 HOURS PRN
Qty: 30 TABLET | Refills: 0 | Status: SHIPPED | OUTPATIENT
Start: 2019-12-11 | End: 2020-08-05 | Stop reason: SDUPTHER

## 2019-12-11 RX ORDER — BENZONATATE 200 MG/1
200 CAPSULE ORAL 3 TIMES DAILY PRN
Qty: 30 CAPSULE | Refills: 0 | Status: SHIPPED | OUTPATIENT
Start: 2019-12-11 | End: 2019-12-21

## 2019-12-11 NOTE — PROGRESS NOTES
"                    Subjective   Diabetes Mellitus Type II, Follow-up  Patient here for follow-up of Type 2 diabetes mellitus.  Current symptoms/problems include none and have been stable. Symptoms are not present.    Known diabetic complications: peripheral neuropathy and peripheral vascular disease  Cardiovascular risk factors: diabetes mellitus, dyslipidemia, hypertension, obesity (BMI >= 30 kg/m2), sedentary lifestyle and smoking/ tobacco exposure  Current diabetic medications include Victoza 1.2mg daily in the morning, and Lantus 90 units twice daily..     Eye exam current (within one year): yes  Weight trend: stable  Prior visit with dietician: no  Current diet: in general, an \"unhealthy\" diet  Current exercise: none    Current monitoring regimen: Daily fasting blood sugars  Home blood sugar records: fasting range:   Any episodes of hypoglycemia? no    Is She on ACE inhibitor or angiotensin II receptor blocker?   Yes   lisinopril (generic)     Gabapentin has been effectively controlling her neuropathy.  Since she has been out of it for 1 month she has noticed that it has returned and it is bothersome mainly in bilateral feet.    Gastroenteritis  For the last 2 weeks she has had intermittent episodes of nausea, nonbloody nonbilious emesis, nonbloody diarrhea, and dry cough.  She denies any fever, travel, or change in diet.  She is able to eat complete meals some days and other days any oral intake she vomits.  The day of the visit she was able to eat Chinese food with no complaints.  It is the best she has felt in the last 2 weeks.  Phenergan helps with the nausea.    Hypertension  Is a chronic problem.  She is taking lisinopril, Lopressor with good blood pressure control.  She is asymptomatic.  She denies headache, changes in vision, chest pain, or shortness of breath.    Past Medical Hx:  Past Medical History:   Diagnosis Date   • Anxiety states    • Asthma    • Carotid artery stenosis     DWIGHT 0-15%, " LICA 0-15%. LICA stent 5/2014.   • Chronic folliculitis    • Chronic obstructive lung disease (CMS/HCC)    • Coronary arteriosclerosis    • Diabetes mellitus (CMS/HCC)     no retinopathy; A1C 9.1      • Dyslipidemia    • Essential hypertension    • Excoriated eczema     asteosis/keratosis   • GERD (gastroesophageal reflux disease)    • History of colon polyps    • Hypertensive disorder    • Kidney stone    • Mixed hyperlipidemia    • Morbid obesity due to excess calories (CMS/[facility])     BMI 44.5   • Neurologic disorder associated with diabetes mellitus (CMS/HCC)    • Non-healing surgical wound     LLE EVHa   • Peripheral vascular disease (CMS/[facility])    • Sleep apnea    • Tobacco dependence syndrome     1/2ppd      • Type 2 diabetes mellitus (CMS/[facility])    • Vitamin D deficiency        Past Surgical Hx:  Past Surgical History:   Procedure Laterality Date   • CARDIAC CATHETERIZATION  08/09/2013    Severe multivessel CAD with critical lesions noted in the RCA, obtuse marginal two, ramus intermemdious, and LAD as described above. Normal LV systolic function with no wall motion abnormality.    • CARDIAC CATHETERIZATION  05/27/2014     LEFT Carotid Stent    • CAROTID STENT Left 05/27/2014   • COLONOSCOPY  05/02/2016    Normal colon.Diverticulosis in the sigmoid colon.No specimens collected.    • CORONARY ARTERY BYPASS GRAFT  11/15/2013    CABG x 3 with LIMA to LAD and coronary endarterectomy to LAD, SVG to Ramus intermedius branch and SVG to PDA.    • CYSTOSCOPY URETEROSCOPY LASER LITHOTRIPSY     • ENDOSCOPY  09/23/2015     Esophageal stricture present.Normal stomach.Normal duodenum.    • ENDOSCOPY  11/16/2015    w/ dilatation - Esophageal stricture present,Dilatation performed.Normal stomach and duodenum.    • HYSTERECTOMY     • INCISION AND DRAINAGE ABSCESS  01/02/2016    Incision and drainage of right perineal abscess.    • INCISION AND DRAINAGE ABSCESS  02/18/2014    Incision and Drainage of abscess of left lower leg    •  OTHER SURGICAL HISTORY  01/25/2016    DESTRUCTION OF BENIGN LESION      • OTHER SURGICAL HISTORY  04/18/2014    ear/neck - L attempted CEA, Neck Dissection    • TUBAL ABDOMINAL LIGATION         Health Maintenance:  Health Maintenance   Topic Date Due   • MEDICARE ANNUAL WELLNESS  08/24/2016   • DIABETIC EYE EXAM  08/24/2016   • LUNG CANCER SCREENING  03/14/2017   • URINE MICROALBUMIN  10/05/2018   • MAMMOGRAM  01/04/2019   • LIPID PANEL  04/04/2019   • HEMOGLOBIN A1C  09/28/2019   • ZOSTER VACCINE (1 of 2) 12/27/2019 (Originally 3/10/2009)   • DIABETIC FOOT EXAM  12/26/2019   • PAP SMEAR  01/04/2020   • PNEUMOCOCCAL VACCINE (19-64 HIGHEST RISK) (3 of 3 - PPSV23) 02/04/2020   • COLONOSCOPY  05/02/2021   • TDAP/TD VACCINES (2 - Td) 11/10/2024   • HEPATITIS C SCREENING  Completed   • INFLUENZA VACCINE  Completed       Current Meds:    Current Outpatient Medications:   •  albuterol (ACCUNEB) 0.63 MG/3ML nebulizer solution, Take 3 mL by nebulization Every 6 (Six) Hours As Needed for Wheezing., Disp: 20 vial, Rfl: 0  •  albuterol sulfate  (90 Base) MCG/ACT inhaler, Inhale 2 puffs Every 4 (Four) Hours As Needed for Wheezing., Disp: 1 inhaler, Rfl: 0  •  atorvastatin (LIPITOR) 20 MG tablet, Take 1 tablet by mouth Daily., Disp: 30 tablet, Rfl: 3  •  benzonatate (TESSALON) 200 MG capsule, Take 1 capsule by mouth 3 (Three) Times a Day As Needed for Cough for up to 10 days., Disp: 30 capsule, Rfl: 0  •  betamethasone valerate (VALISONE) 0.1 % cream, Apply  topically to the appropriate area as directed Daily., Disp: 45 g, Rfl: 2  •  Blood Glucose Monitoring Suppl ([employer] BLOOD GLUCOSE METER) W/DEVICE kit, 1 each 3 (Three) Times a Day., Disp: 1 kit, Rfl: 3  •  cetirizine (zyrTEC) 10 MG tablet, Take 1 tablet by mouth Daily., Disp: 30 tablet, Rfl: 3  •  clopidogrel (PLAVIX) 75 MG tablet, TAKE 1 TABLET BY MOUTH DAILY., Disp: 30 tablet, Rfl: 5  •  cyclobenzaprine (FLEXERIL) 10 MG tablet, TAKE 1 TABLET BY MOUTH 2 (TWO) TIMES A DAY  AS NEEDED FOR MUSCLE SPASMS., Disp: 60 tablet, Rfl: 5  •  Empagliflozin (JARDIANCE) 10 MG tablet, Take 10 mg by mouth Every Morning., Disp: 90 tablet, Rfl: 3  •  ferrous sulfate (FERROUSUL) 325 (65 FE) MG tablet, Take 1 tablet by mouth Daily With Breakfast., Disp: 30 tablet, Rfl: 3  •  furosemide (LASIX) 40 MG tablet, TAKE 1 TABLET BY MOUTH DAILY., Disp: 30 tablet, Rfl: 3  •  gabapentin (NEURONTIN) 800 MG tablet, Take 1 tablet by mouth 3 (Three) Times a Day. For neuropathy, Disp: 90 tablet, Rfl: 2  •  glucose blood test strip, Use as instructed, Disp: 100 each, Rfl: 12  •  Insulin Pen Needle (NOVOFINE) 30G X 8 MM misc, As directed 4 times daily, Disp: 100 each, Rfl: 11  •  ipratropium-albuterol (DUO-NEB) 0.5-2.5 mg/3 ml nebulizer, Take 3 mL by nebulization 4 (Four) Times a Day. ICD10 code J96.01, Disp: 360 mL, Rfl: 0  •  LANTUS SOLOSTAR 100 UNIT/ML injection pen, Inject 95 Units under the skin into the appropriate area as directed Every 12 (Twelve) Hours., Disp: 3 mL, Rfl: 11  •  lidocaine (LIDODERM) 5 %, Place 1 patch on the skin as directed by provider Daily. Remove & Discard patch within 12 hours or as directed by MD, Disp: 15 each, Rfl: 0  •  lidocaine (XYLOCAINE) 5 % ointment, Apply  topically to the appropriate area as directed Every 2 (Two) Hours As Needed for Mild Pain ., Disp: 1 tube, Rfl: 0  •  Liraglutide (VICTOZA) 18 MG/3ML solution pen-injector injection, Inject 1.2 mg under the skin into the appropriate area as directed Every Morning., Disp: 9 mL, Rfl: 3  •  lisinopril (PRINIVIL,ZESTRIL) 10 MG tablet, TAKE 1 TABLET BY MOUTH DAILY., Disp: 30 tablet, Rfl: 5  •  metFORMIN (GLUCOPHAGE) 1000 MG tablet, Take 1 tablet by mouth 2 (Two) Times a Day With Meals., Disp: 60 tablet, Rfl: 5  •  metoprolol tartrate (LOPRESSOR) 50 MG tablet, Take 1 tablet by mouth Every 12 (Twelve) Hours., Disp: 60 tablet, Rfl: 3  •  montelukast (SINGULAIR) 10 MG tablet, Take 1 tablet by mouth Every Night., Disp: 30 tablet, Rfl: 3  •   naproxen (NAPROSYN) 500 MG tablet, Take 1 tablet by mouth 2 (Two) Times a Day With Meals., Disp: 60 tablet, Rfl: 3  •  nicotine (NICODERM CQ) 21 MG/24HR patch, Place 1 patch on the skin as directed by provider Daily., Disp: 42 patch, Rfl: 0  •  nitroglycerin (NITROSTAT) 0.4 MG SL tablet, Place 1 tablet under the tongue As Needed for Chest Pain. Take no more than 3 doses in 15 minutes., Disp: 30 tablet, Rfl: 3  •  nystatin (MYCOSTATIN) 541016 UNIT/GM powder, Apply  topically to the appropriate area as directed 3 (Three) Times a Day., Disp: 15 g, Rfl: 1  •  ondansetron ODT (ZOFRAN ODT) 4 MG disintegrating tablet, Take 1 tablet by mouth Every 12 (Twelve) Hours As Needed for Nausea or Vomiting., Disp: 30 tablet, Rfl: 0  •  pantoprazole (PROTONIX) 40 MG EC tablet, TAKE 1 TABLET BY MOUTH DAILY., Disp: 30 tablet, Rfl: 6  •  potassium chloride (K-DUR) 10 MEQ CR tablet, Take 1 tablet by mouth Every Night., Disp: 30 tablet, Rfl: 3  •  promethazine (PHENERGAN) 25 MG tablet, Take 1 tablet by mouth Every 6 (Six) Hours As Needed for Nausea or Vomiting., Disp: 30 tablet, Rfl: 0  •  SYMBICORT 160-4.5 MCG/ACT inhaler, INHALE 2 PUFFS 2 (TWO) TIMES A DAY., Disp: 10.2 g, Rfl: 6  No current facility-administered medications for this visit.     Allergies:  Adhesive tape and Other    Family Hx:  Family History   Problem Relation Age of Onset   • Heart disease Mother    • Cancer Mother    • Heart disease Father    • Heart disease Sister    • Cancer Sister    • Heart disease Brother         Social History:  Social History     Socioeconomic History   • Marital status:      Spouse name: wesley dumont   • Number of children: Not on file   • Years of education: Not on file   • Highest education level: Not on file   Tobacco Use   • Smoking status: Current Every Day Smoker     Packs/day: 1.00     Years: 40.00     Pack years: 40.00     Types: Cigarettes   • Smokeless tobacco: Never Used   Substance and Sexual Activity   • Alcohol use: No   •  "Drug use: Yes     Types: LSD, Marijuana, Methamphetamines     Comment: Lasted used in 2006   • Sexual activity: Not Currently     Partners: Male       Review of Systems  Review of Systems   Constitutional: Negative for activity change and appetite change.   HENT: Negative for congestion and trouble swallowing.    Respiratory: Positive for cough. Negative for chest tightness and shortness of breath.    Cardiovascular: Negative for chest pain and palpitations.   Gastrointestinal: Positive for nausea and vomiting. Negative for abdominal distention, abdominal pain, blood in stool and constipation.   Genitourinary: Negative for difficulty urinating and dysuria.   Musculoskeletal: Negative for arthralgias and myalgias.   Skin: Negative for color change and pallor.   Neurological: Negative for dizziness, light-headedness and headaches.   Psychiatric/Behavioral: Negative for agitation and behavioral problems.            Objective:     /80   Pulse 92   Temp 96.8 °F (36 °C) (Temporal)   Ht 157.5 cm (62\")   Wt 129 kg (285 lb) Comment: WEIGHT WAS ENTERED IN WRONG THE FIRST TIME. HOMERO VENTURA  LMP  (LMP Unknown)   SpO2 96%   BMI 52.13 kg/m²       Physical Exam   Constitutional: She is oriented to person, place, and time. She appears well-developed and well-nourished. No distress.   HENT:   Head: Normocephalic and atraumatic.   Right Ear: External ear normal.   Left Ear: External ear normal.   Nose: Nose normal.   Eyes: Pupils are equal, round, and reactive to light. EOM are normal.   Neck: Normal range of motion. Neck supple.   Cardiovascular: Normal rate, regular rhythm and normal heart sounds. Exam reveals no gallop and no friction rub.   No murmur heard.  Pulmonary/Chest: Effort normal and breath sounds normal. No respiratory distress. She has no wheezes. She has no rales.   Abdominal: Soft. Bowel sounds are normal. She exhibits no distension. There is no tenderness.   Musculoskeletal: Normal range of motion. She " exhibits no edema.   Neurological: She is alert and oriented to person, place, and time.   Skin: Skin is warm. Capillary refill takes less than 2 seconds. No erythema.   Diabetic foot exam:   Left: Filament test absent   Pulses Dorsalis Pedis:  present   Proprioception diminished   Sharp/dull discrimination diminished   Callus noted              Peripheral neuropathy present continues to be a problem    Right: Filament test absent   Pulses Dorsalis Pedis:  present   Proprioception diminished   Sharp/dull discrimination diminished              Callus noted              Peripheral neuropathy present continues to be a problem   Psychiatric: She has a normal mood and affect. Her behavior is normal.       Assessment/Plan:     1. Uncontrolled type 2 diabetes mellitus with diabetic polyneuropathy, with long-term current use of insulin (CMS/[facility])   -There is a chronic problem.  She has achieved good control of her glucose on her current medication regimen.  We will continue this.  -Repeat hemoglobin A1c, BMP  -Gabapentin refilled  -UDS.  Do not expect medication to be present as she has been out of medication for 1 month  -Mamadou reviewed and appropriate, request #[id number]     2. Gastroenteritis   -This problem is improving.  Recommend advancing diet as tolerated.  She can use Phenergan and Zofran as needed for nausea.  -Tessalon Perles were given for cough  -Continue to monitor     3. Essential hypertension   -This is a chronic problem.  She is stable on her current medication regimen so we will continue.     4. Morbid obesity with BMI of 50.0-59.9, adult (CMS/[facility])   -This is a chronic problem.  Weight loss strategies including but not limited to exercise counseling and nutrition education have been performed on multiple occasions     5. Tobacco dependence syndrome   -Patient was counseled on smoking cessation for over 11 minutes.  She denies any smoking cessation aids at this time, she will use her 's nicotine  patches.        Follow-up:     Return in about 3 months (around 3/10/2020). For DM recheck.    Goals     • Fasting Blood Glucose       Barriers: compliance with diet      • Quit smoking / using tobacco           Preventative:    Vaccines Recommended at this visit:   Influenza and Prevnar 13    Vaccines Received at this visit:  Influenza and Prevnar 13    Screenings Recommended at this visit:  Colonoscopy, Low Dose CT (lung cancer), Mammogram and Hemoglobin A1c, and diabetic foot exam    Screenings Ordered at this visit:  All    Smoking Status:  Patient is current smoker. Patient is not interested in smoking cessation.    Alcohol Intake:  Patient does not drink    Patient's Body mass index is 52.13 kg/m². BMI is above normal parameters. Recommendations include: exercise counseling and nutrition counseling.         RISK SCORE: 4          This document has been electronically signed by Nirmal Calvillo MD on December 11, 2019 10:17 AM

## 2019-12-11 NOTE — PROGRESS NOTES
I have seen the patient.  I was present during key portions of the physical exam and history taking process I have reviewed the notes, assessments, and/or procedures performed by Nirmal Calvillo MD, I concur with her/his documentation and assessment and plan for Yamileth Sylvester Bryon.               This document has been electronically signed by Blake Burris MD on December 11, 2019 1:41 PM

## 2019-12-18 DIAGNOSIS — IMO0002 UNCONTROLLED TYPE 2 DIABETES MELLITUS WITH COMPLICATION, WITH LONG-TERM CURRENT USE OF INSULIN: ICD-10-CM

## 2019-12-19 RX ORDER — POTASSIUM CHLORIDE 750 MG/1
CAPSULE, EXTENDED RELEASE ORAL
Qty: 30 CAPSULE | Refills: 3 | Status: SHIPPED | OUTPATIENT
Start: 2019-12-19 | End: 2020-04-16 | Stop reason: SDUPTHER

## 2019-12-19 RX ORDER — LIRAGLUTIDE 6 MG/ML
INJECTION SUBCUTANEOUS
Qty: 6 ML | Refills: 3 | Status: SHIPPED | OUTPATIENT
Start: 2019-12-19 | End: 2020-05-08

## 2019-12-26 DIAGNOSIS — D50.8 OTHER IRON DEFICIENCY ANEMIA: ICD-10-CM

## 2019-12-26 DIAGNOSIS — E11.69 DIABETES MELLITUS TYPE 2 IN OBESE (HCC): ICD-10-CM

## 2019-12-26 DIAGNOSIS — E66.9 DIABETES MELLITUS TYPE 2 IN OBESE (HCC): ICD-10-CM

## 2019-12-27 RX ORDER — FERROUS SULFATE 325(65) MG
TABLET ORAL
Qty: 30 TABLET | Refills: 3 | Status: SHIPPED | OUTPATIENT
Start: 2019-12-27 | End: 2020-06-08 | Stop reason: SDUPTHER

## 2019-12-27 RX ORDER — ATORVASTATIN CALCIUM 20 MG/1
20 TABLET, FILM COATED ORAL DAILY
Qty: 30 TABLET | Refills: 3 | Status: SHIPPED | OUTPATIENT
Start: 2019-12-27 | End: 2020-05-28

## 2020-01-02 RX ORDER — LISINOPRIL 10 MG/1
10 TABLET ORAL DAILY
Qty: 30 TABLET | Refills: 5 | Status: SHIPPED | OUTPATIENT
Start: 2020-01-02 | End: 2020-06-08 | Stop reason: SDUPTHER

## 2020-01-02 RX ORDER — CLOPIDOGREL BISULFATE 75 MG/1
75 TABLET ORAL DAILY
Qty: 30 TABLET | Refills: 5 | Status: SHIPPED | OUTPATIENT
Start: 2020-01-02 | End: 2020-06-08 | Stop reason: SDUPTHER

## 2020-01-10 ENCOUNTER — LAB (OUTPATIENT)
Dept: LAB | Facility: HOSPITAL | Age: 60
End: 2020-01-10

## 2020-01-10 ENCOUNTER — HOSPITAL ENCOUNTER (OUTPATIENT)
Dept: CT IMAGING | Facility: HOSPITAL | Age: 60
Discharge: HOME | End: 2020-01-10
Admitting: FAMILY MEDICINE

## 2020-01-10 ENCOUNTER — APPOINTMENT (OUTPATIENT)
Dept: LAB | Facility: HOSPITAL | Age: 60
End: 2020-01-10

## 2020-01-10 DIAGNOSIS — IMO0002 UNCONTROLLED TYPE 2 DIABETES MELLITUS WITH DIABETIC POLYNEUROPATHY, WITH LONG-TERM CURRENT USE OF INSULIN: ICD-10-CM

## 2020-01-10 DIAGNOSIS — Z12.2 ENCOUNTER FOR SCREENING FOR LUNG CANCER: ICD-10-CM

## 2020-01-10 DIAGNOSIS — F17.200 TOBACCO DEPENDENCE SYNDROME: ICD-10-CM

## 2020-01-10 DIAGNOSIS — Z87.891 PERSONAL HISTORY OF NICOTINE DEPENDENCE: ICD-10-CM

## 2020-01-10 LAB
ANION GAP SERPL CALCULATED.3IONS-SCNC: 19.3 MMOL/L (ref 5–15)
BUN BLD-MCNC: 37 MG/DL (ref 8–23)
BUN/CREAT SERPL: 21.6 (ref 7–25)
CALCIUM SPEC-SCNC: 9.1 MG/DL (ref 8.6–10.5)
CHLORIDE SERPL-SCNC: 95 MMOL/L (ref 98–107)
CO2 SERPL-SCNC: 17.7 MMOL/L (ref 22–29)
CREAT BLD-MCNC: 1.71 MG/DL (ref 0.57–1)
GFR SERPL CREATININE-BSD FRML MDRD: 30 ML/MIN/1.73
GLUCOSE BLD-MCNC: 279 MG/DL (ref 65–99)
HBA1C MFR BLD: 6.76 % (ref 4.8–5.6)
POTASSIUM BLD-SCNC: 4.9 MMOL/L (ref 3.5–5.2)
SODIUM BLD-SCNC: 132 MMOL/L (ref 136–145)

## 2020-01-10 PROCEDURE — 83036 HEMOGLOBIN GLYCOSYLATED A1C: CPT

## 2020-01-10 PROCEDURE — G0297 LDCT FOR LUNG CA SCREEN: HCPCS

## 2020-01-10 PROCEDURE — 36415 COLL VENOUS BLD VENIPUNCTURE: CPT

## 2020-01-10 PROCEDURE — 80048 BASIC METABOLIC PNL TOTAL CA: CPT

## 2020-01-13 DIAGNOSIS — J44.0 CHRONIC OBSTRUCTIVE PULMONARY DISEASE WITH ACUTE LOWER RESPIRATORY INFECTION (HCC): ICD-10-CM

## 2020-01-13 RX ORDER — MONTELUKAST SODIUM 10 MG/1
10 TABLET ORAL NIGHTLY
Qty: 30 TABLET | Refills: 3 | Status: SHIPPED | OUTPATIENT
Start: 2020-01-13 | End: 2020-05-08

## 2020-01-17 ENCOUNTER — TELEPHONE (OUTPATIENT)
Dept: FAMILY MEDICINE CLINIC | Facility: CLINIC | Age: 60
End: 2020-01-17

## 2020-01-17 NOTE — TELEPHONE ENCOUNTER
Pt called and was needing a call back regarding her cat scan results. Her number to call back is [phone].      Thank you,      Luisa

## 2020-01-22 RX ORDER — PANTOPRAZOLE SODIUM 40 MG/1
40 TABLET, DELAYED RELEASE ORAL DAILY
Qty: 30 TABLET | Refills: 6 | Status: SHIPPED | OUTPATIENT
Start: 2020-01-22 | End: 2020-06-08 | Stop reason: SDUPTHER

## 2020-02-06 RX ORDER — METFORMIN HYDROCHLORIDE 1000 MG/1
1000 TABLET, FILM COATED, EXTENDED RELEASE ORAL 2 TIMES DAILY
Qty: 60 TABLET | Refills: 11 | Status: SHIPPED | OUTPATIENT
Start: 2020-02-06 | End: 2020-03-05

## 2020-02-12 RX ORDER — METOPROLOL TARTRATE 50 MG/1
50 TABLET, FILM COATED ORAL EVERY 12 HOURS SCHEDULED
Qty: 60 TABLET | Refills: 5 | Status: SHIPPED | OUTPATIENT
Start: 2020-02-12 | End: 2020-08-04

## 2020-03-05 RX ORDER — METFORMIN HYDROCHLORIDE 1000 MG/1
TABLET, FILM COATED, EXTENDED RELEASE ORAL
Qty: 60 TABLET | Refills: 5 | Status: ON HOLD | OUTPATIENT
Start: 2020-03-05 | End: 2020-07-14

## 2020-03-19 DIAGNOSIS — IMO0002 UNCONTROLLED TYPE 2 DIABETES MELLITUS WITH DIABETIC POLYNEUROPATHY, WITH LONG-TERM CURRENT USE OF INSULIN: ICD-10-CM

## 2020-03-19 RX ORDER — GABAPENTIN 800 MG/1
800 TABLET ORAL 3 TIMES DAILY
Qty: 90 TABLET | Refills: 2 | Status: SHIPPED | OUTPATIENT
Start: 2020-03-19 | End: 2020-06-08 | Stop reason: SDUPTHER

## 2020-04-10 RX ORDER — POTASSIUM CHLORIDE 750 MG/1
CAPSULE, EXTENDED RELEASE ORAL
Qty: 30 CAPSULE | Refills: 3 | OUTPATIENT
Start: 2020-04-10

## 2020-04-16 RX ORDER — POTASSIUM CHLORIDE 750 MG/1
CAPSULE, EXTENDED RELEASE ORAL
Qty: 30 CAPSULE | Refills: 3 | Status: ON HOLD | OUTPATIENT
Start: 2020-04-16 | End: 2020-07-14

## 2020-04-21 ENCOUNTER — OFFICE VISIT (OUTPATIENT)
Dept: FAMILY MEDICINE CLINIC | Facility: CLINIC | Age: 60
End: 2020-04-21

## 2020-04-21 VITALS — BODY MASS INDEX: 52.31 KG/M2 | WEIGHT: 286 LBS

## 2020-04-21 DIAGNOSIS — R05.9 COUGH: ICD-10-CM

## 2020-04-21 DIAGNOSIS — E11.42 TYPE 2 DIABETES MELLITUS WITH DIABETIC POLYNEUROPATHY, WITH LONG-TERM CURRENT USE OF INSULIN (HCC): Primary | ICD-10-CM

## 2020-04-21 DIAGNOSIS — Z79.4 TYPE 2 DIABETES MELLITUS WITH DIABETIC POLYNEUROPATHY, WITH LONG-TERM CURRENT USE OF INSULIN (HCC): Primary | ICD-10-CM

## 2020-04-21 PROCEDURE — 99442 PR PHYS/QHP TELEPHONE EVALUATION 11-20 MIN: CPT | Performed by: STUDENT IN AN ORGANIZED HEALTH CARE EDUCATION/TRAINING PROGRAM

## 2020-04-21 NOTE — PROGRESS NOTES
Family Medicine Residency  Nirmal Calvillo MD    Subjective:     Yamileth Slater is a 60 y.o. female who presents for cough and DM.    Cough  Cough for 8 weeks. [facility] office diagnosed with bronchitis for which was given a shot of  Rocephin and steroid shot with a 1 week course of antibiotics and steroids. No improvement in symptoms. Not tested for covid and denies being exposed to her knowledge exposed. Subjective fevers. Sometimes cough is productive of yellow sputum with black spots. Still smoking. Stops with patches in hospital but ones in pharmacy break her out so does not use them. 2 nodules in right lung supposed benign inflammatory nodules and recommended repeat CT chest in 12 months. OTC cough medicine has not helped.    DM  She takes Lantus 90 units in AM and PM, 1.2 Victoza daily, and Metformin 1000mg BID. Blood sugars fasting are . Currently is not exercising but is working on improving her diet. Hemoglobin A1c 3 months ago is continuing to decrease to 6.76.    The following portions of the patient's history were reviewed and updated as appropriate: allergies, current medications, past family history, past medical history, past social history, past surgical history and problem list.    Past Medical Hx:  Past Medical History:   Diagnosis Date   • Anxiety states    • Asthma    • Carotid artery stenosis     DWIGHT 0-15%, LICA 0-15%. LICA stent 5/2014.   • Chronic folliculitis    • Chronic obstructive lung disease (CMS/HCC)    • Coronary arteriosclerosis    • Diabetes mellitus (CMS/HCC)     no retinopathy; A1C 9.1      • Dyslipidemia    • Essential hypertension    • Excoriated eczema     asteosis/keratosis   • GERD (gastroesophageal reflux disease)    • History of colon polyps    • Hypertensive disorder    • Kidney stone    • Mixed hyperlipidemia    • Morbid obesity due to excess calories (CMS/HCC)     BMI 44.5   • Neurologic disorder associated with diabetes mellitus (CMS/HCC)    •  Non-healing surgical wound     LLE EVHa   • Peripheral vascular disease (CMS/HCC)    • Sleep apnea    • Tobacco dependence syndrome     1/2ppd      • Type 2 diabetes mellitus (CMS/HCC)    • Vitamin D deficiency        Past Surgical Hx:  Past Surgical History:   Procedure Laterality Date   • CARDIAC CATHETERIZATION  08/09/2013    Severe multivessel CAD with critical lesions noted in the RCA, obtuse marginal two, ramus intermemdious, and LAD as described above. Normal LV systolic function with no wall motion abnormality.    • CARDIAC CATHETERIZATION  05/27/2014     LEFT Carotid Stent    • CAROTID STENT Left 05/27/2014   • COLONOSCOPY  05/02/2016    Normal colon.Diverticulosis in the sigmoid colon.No specimens collected.    • CORONARY ARTERY BYPASS GRAFT  11/15/2013    CABG x 3 with LIMA to LAD and coronary endarterectomy to LAD, SVG to Ramus intermedius branch and SVG to PDA.    • CYSTOSCOPY URETEROSCOPY LASER LITHOTRIPSY     • ENDOSCOPY  09/23/2015     Esophageal stricture present.Normal stomach.Normal duodenum.    • ENDOSCOPY  11/16/2015    w/ dilatation - Esophageal stricture present,Dilatation performed.Normal stomach and duodenum.    • HYSTERECTOMY     • INCISION AND DRAINAGE ABSCESS  01/02/2016    Incision and drainage of right perineal abscess.    • INCISION AND DRAINAGE ABSCESS  02/18/2014    Incision and Drainage of abscess of left lower leg    • OTHER SURGICAL HISTORY  01/25/2016    DESTRUCTION OF BENIGN LESION      • OTHER SURGICAL HISTORY  04/18/2014    ear/neck - L attempted CEA, Neck Dissection    • TUBAL ABDOMINAL LIGATION         Current Meds:    Current Outpatient Medications:   •  albuterol (ACCUNEB) 0.63 MG/3ML nebulizer solution, Take 3 mL by nebulization Every 6 (Six) Hours As Needed for Wheezing., Disp: 20 vial, Rfl: 0  •  albuterol sulfate  (90 Base) MCG/ACT inhaler, Inhale 2 puffs Every 4 (Four) Hours As Needed for Wheezing., Disp: 1 inhaler, Rfl: 0  •  atorvastatin (LIPITOR) 20 MG  tablet, TAKE 1 TABLET BY MOUTH DAILY., Disp: 30 tablet, Rfl: 3  •  betamethasone valerate (VALISONE) 0.1 % cream, Apply  topically to the appropriate area as directed Daily., Disp: 45 g, Rfl: 2  •  Blood Glucose Monitoring Suppl ([employer] BLOOD GLUCOSE METER) W/DEVICE kit, 1 each 3 (Three) Times a Day., Disp: 1 kit, Rfl: 3  •  cetirizine (zyrTEC) 10 MG tablet, Take 1 tablet by mouth Daily., Disp: 30 tablet, Rfl: 3  •  clopidogrel (PLAVIX) 75 MG tablet, TAKE 1 TABLET BY MOUTH DAILY., Disp: 30 tablet, Rfl: 5  •  cyclobenzaprine (FLEXERIL) 10 MG tablet, TAKE 1 TABLET BY MOUTH 2 (TWO) TIMES A DAY AS NEEDED FOR MUSCLE SPASMS., Disp: 60 tablet, Rfl: 5  •  Empagliflozin (JARDIANCE) 10 MG tablet, Take 10 mg by mouth Every Morning., Disp: 90 tablet, Rfl: 3  •  FEROSUL 325 (65 Fe) MG tablet, TAKE 1 TABLET BY MOUTH DAILY WITH BREAKFAST., Disp: 30 tablet, Rfl: 3  •  furosemide (LASIX) 40 MG tablet, TAKE 1 TABLET BY MOUTH DAILY., Disp: 30 tablet, Rfl: 3  •  gabapentin (NEURONTIN) 800 MG tablet, Take 1 tablet by mouth 3 (Three) Times a Day. For neuropathy, Disp: 90 tablet, Rfl: 2  •  glucose blood test strip, Use as instructed, Disp: 100 each, Rfl: 12  •  Insulin Pen Needle (NOVOFINE) 30G X 8 MM misc, As directed 4 times daily, Disp: 100 each, Rfl: 11  •  ipratropium-albuterol (DUO-NEB) 0.5-2.5 mg/3 ml nebulizer, Take 3 mL by nebulization 4 (Four) Times a Day. ICD10 code J96.01, Disp: 360 mL, Rfl: 0  •  LANTUS SOLOSTAR 100 UNIT/ML injection pen, Inject 95 Units under the skin into the appropriate area as directed Every 12 (Twelve) Hours., Disp: 3 mL, Rfl: 11  •  lidocaine (LIDODERM) 5 %, Place 1 patch on the skin as directed by provider Daily. Remove & Discard patch within 12 hours or as directed by MD, Disp: 15 each, Rfl: 0  •  lidocaine (XYLOCAINE) 5 % ointment, Apply  topically to the appropriate area as directed Every 2 (Two) Hours As Needed for Mild Pain ., Disp: 1 tube, Rfl: 0  •  lisinopril (PRINIVIL,ZESTRIL) 10 MG tablet, TAKE  1 TABLET BY MOUTH DAILY., Disp: 30 tablet, Rfl: 5  •  metFORMIN (GLUCOPHAGE) 1000 MG tablet, Take 1 tablet by mouth 2 (Two) Times a Day With Meals., Disp: 60 tablet, Rfl: 5  •  metFORMIN (GLUMETZA) 1000 MG (MOD) 24 hr tablet, TAKE ONE TABLET BY MOUTH TWO TIMES A DAY, Disp: 60 tablet, Rfl: 5  •  metoprolol tartrate (LOPRESSOR) 50 MG tablet, TAKE 1 TABLET BY MOUTH EVERY 12 (TWELVE) HOURS., Disp: 60 tablet, Rfl: 5  •  montelukast (SINGULAIR) 10 MG tablet, TAKE 1 TABLET BY MOUTH EVERY NIGHT., Disp: 30 tablet, Rfl: 3  •  naproxen (NAPROSYN) 500 MG tablet, Take 1 tablet by mouth 2 (Two) Times a Day With Meals., Disp: 60 tablet, Rfl: 3  •  nicotine (NICODERM CQ) 21 MG/24HR patch, Place 1 patch on the skin as directed by provider Daily., Disp: 42 patch, Rfl: 0  •  nitroglycerin (NITROSTAT) 0.4 MG SL tablet, Place 1 tablet under the tongue As Needed for Chest Pain. Take no more than 3 doses in 15 minutes., Disp: 30 tablet, Rfl: 3  •  nystatin (MYCOSTATIN) 884823 UNIT/GM powder, Apply  topically to the appropriate area as directed 3 (Three) Times a Day., Disp: 15 g, Rfl: 1  •  ondansetron ODT (ZOFRAN ODT) 4 MG disintegrating tablet, Take 1 tablet by mouth Every 12 (Twelve) Hours As Needed for Nausea or Vomiting., Disp: 30 tablet, Rfl: 0  •  pantoprazole (PROTONIX) 40 MG EC tablet, TAKE 1 TABLET BY MOUTH DAILY., Disp: 30 tablet, Rfl: 6  •  potassium chloride (MICRO-K) 10 MEQ CR capsule, TAKE 1 CAPSULE BY MOUTH EVERY NIGHT., Disp: 30 capsule, Rfl: 3  •  promethazine (PHENERGAN) 25 MG tablet, Take 1 tablet by mouth Every 6 (Six) Hours As Needed for Nausea or Vomiting., Disp: 30 tablet, Rfl: 0  •  SYMBICORT 160-4.5 MCG/ACT inhaler, INHALE 2 PUFFS 2 (TWO) TIMES A DAY., Disp: 10.2 g, Rfl: 6  •  VICTOZA 18 MG/3ML solution pen-injector injection, INJECT 1.2 MG UNDER THE SKIN INTO THE APPROPRIATE AREA AS DIRECTED EVERY MORNING., Disp: 6 mL, Rfl: 3    Allergies:  Allergies   Allergen Reactions   • Adhesive Tape Hives   • Other       Bandaids, MRSA, SURECLOSE       Family Hx:  Family History   Problem Relation Age of Onset   • Heart disease Mother    • Cancer Mother    • Heart disease Father    • Heart disease Sister    • Cancer Sister    • Heart disease Brother         Social History:  Social History     Socioeconomic History   • Marital status:      Spouse name: wesley dumont   • Number of children: Not on file   • Years of education: Not on file   • Highest education level: Not on file   Tobacco Use   • Smoking status: Current Every Day Smoker     Packs/day: 1.00     Years: 40.00     Pack years: 40.00     Types: Cigarettes   • Smokeless tobacco: Never Used   Substance and Sexual Activity   • Alcohol use: No   • Drug use: Yes     Types: LSD, Marijuana, Methamphetamines     Comment: Lasted used in 2006   • Sexual activity: Not Currently     Partners: Male       Review of Systems  Review of Systems   Constitutional: Negative for activity change and appetite change.   HENT: Negative for congestion and trouble swallowing.    Respiratory: Positive for cough. Negative for chest tightness and shortness of breath.    Cardiovascular: Negative for chest pain and palpitations.   Gastrointestinal: Negative for abdominal distention and abdominal pain.   Genitourinary: Negative for difficulty urinating and dysuria.   Musculoskeletal: Negative for arthralgias and myalgias.   Skin: Negative for color change and pallor.   Neurological: Negative for dizziness, light-headedness and headaches.   Psychiatric/Behavioral: Negative for agitation and behavioral problems.       Objective:     Wt 130 kg (286 lb) Comment: reported  LMP  (LMP Unknown)   Breastfeeding No   BMI 52.31 kg/m²      No physical exam was performed as this was a telephone visit.    Physical Exam     Assessment/Plan:     Yamileth was seen today for uri and cough.    Diagnoses and all orders for this visit:    Type 2 diabetes mellitus with diabetic polyneuropathy, with long-term current use of  insulin (CMS/[facility])  -     Hemoglobin A1c; Future  -  Continue current regimen    Cough        - Recommended to stop smoking and continue to monitor symptoms. Recommended nicotine patches as they eliminate her cigarette craving in hospital.       · Rx changes: None  · Patient Education: Continue current regimen as HgbA1c is improving.  · Compliance at present is estimated to be excellent.   · Efforts to improve compliance (if necessary) will be directed at unnecessary at this time.     Follow-up:     Return in about 4 weeks (around 5/19/2020).    Preventative:  Health Maintenance   Topic Date Due   • ZOSTER VACCINE (1 of 2) 03/10/2009   • MEDICARE ANNUAL WELLNESS  08/24/2016   • DIABETIC EYE EXAM  08/24/2016   • URINE MICROALBUMIN  10/05/2018   • LIPID PANEL  04/04/2019   • DIABETIC FOOT EXAM  12/26/2019   • PAP SMEAR  01/04/2020   • HEMOGLOBIN A1C  07/10/2020   • INFLUENZA VACCINE  08/01/2020   • LUNG CANCER SCREENING  01/10/2021   • COLONOSCOPY  05/02/2021   • MAMMOGRAM  01/10/2022   • TDAP/TD VACCINES (3 - Td) 11/10/2024   • PNEUMOCOCCAL VACCINE (19-64 MEDIUM RISK)  Completed   • HEPATITIS C SCREENING  Completed       Recommended: none  Vaccine Counseling: N/A    Weight  -Class: Obese Class III extreme obesity: > or equal to 40kg/m2  -Patient's Body mass index is 52.31 kg/m². BMI is above normal parameters. Recommendations include: exercise counseling and nutrition counseling.   eat more fruits and vegetables, decrease soda or juice intake, increase water intake, increase physical activity, reduce screen time, reduce portion size and cut out extra servings    Alcohol use:  reports that she does not drink alcohol.  Nicotine status  reports that she has been smoking cigarettes. She has a 40.00 pack-year smoking history. She has never used smokeless tobacco.    Goals     • Fasting Blood Glucose       Barriers: compliance with diet      • Quit smoking / using tobacco           RISK SCORE: 4          This document  has been electronically signed by Nirmal Calvillo MD on April 21, 2020 16:44    The total duration of this telephone encounter was 12 minutes.

## 2020-04-22 NOTE — PROGRESS NOTES
I have reviewed the notes, assessments, and/or procedures performed by Dr. Nirmal Calvillo, I concur with his  documentation and assessment and plan for Yamileth Slater        This document has been electronically signed by Helder Lee MD on April 22, 2020 09:17

## 2020-05-07 DIAGNOSIS — IMO0002 UNCONTROLLED TYPE 2 DIABETES MELLITUS WITH COMPLICATION, WITH LONG-TERM CURRENT USE OF INSULIN: ICD-10-CM

## 2020-05-08 DIAGNOSIS — J44.0 CHRONIC OBSTRUCTIVE PULMONARY DISEASE WITH ACUTE LOWER RESPIRATORY INFECTION (HCC): ICD-10-CM

## 2020-05-08 RX ORDER — LIRAGLUTIDE 6 MG/ML
INJECTION SUBCUTANEOUS
Qty: 6 ML | Refills: 3 | Status: ON HOLD | OUTPATIENT
Start: 2020-05-08 | End: 2020-07-14

## 2020-05-08 RX ORDER — MONTELUKAST SODIUM 10 MG/1
10 TABLET ORAL NIGHTLY
Qty: 30 TABLET | Refills: 5 | Status: SHIPPED | OUTPATIENT
Start: 2020-05-08 | End: 2020-08-05 | Stop reason: SDUPTHER

## 2020-05-11 RX ORDER — INSULIN GLARGINE 100 [IU]/ML
95 INJECTION, SOLUTION SUBCUTANEOUS EVERY 12 HOURS
Qty: 60 ML | Refills: 11 | Status: ON HOLD | OUTPATIENT
Start: 2020-05-11 | End: 2020-07-14

## 2020-05-13 RX ORDER — INSULIN GLARGINE 100 [IU]/ML
95 INJECTION, SOLUTION SUBCUTANEOUS EVERY 12 HOURS
Qty: 60 ML | Refills: 11 | Status: SHIPPED | OUTPATIENT
Start: 2020-05-13 | End: 2020-09-02 | Stop reason: SDUPTHER

## 2020-05-28 RX ORDER — ATORVASTATIN CALCIUM 20 MG/1
20 TABLET, FILM COATED ORAL DAILY
Qty: 90 TABLET | Refills: 3 | Status: SHIPPED | OUTPATIENT
Start: 2020-05-28 | End: 2020-06-24 | Stop reason: SDUPTHER

## 2020-06-08 ENCOUNTER — OFFICE VISIT (OUTPATIENT)
Dept: FAMILY MEDICINE CLINIC | Facility: CLINIC | Age: 60
End: 2020-06-08

## 2020-06-08 ENCOUNTER — LAB (OUTPATIENT)
Dept: LAB | Facility: HOSPITAL | Age: 60
End: 2020-06-08

## 2020-06-08 VITALS
DIASTOLIC BLOOD PRESSURE: 88 MMHG | HEIGHT: 62 IN | WEIGHT: 285 LBS | HEART RATE: 86 BPM | OXYGEN SATURATION: 96 % | BODY MASS INDEX: 52.44 KG/M2 | SYSTOLIC BLOOD PRESSURE: 140 MMHG

## 2020-06-08 DIAGNOSIS — E66.9 DIABETES MELLITUS TYPE 2 IN OBESE (HCC): ICD-10-CM

## 2020-06-08 DIAGNOSIS — E11.42 TYPE 2 DIABETES MELLITUS WITH DIABETIC POLYNEUROPATHY, WITH LONG-TERM CURRENT USE OF INSULIN (HCC): ICD-10-CM

## 2020-06-08 DIAGNOSIS — IMO0002 UNCONTROLLED TYPE 2 DIABETES MELLITUS WITH COMPLICATION, WITH LONG-TERM CURRENT USE OF INSULIN: ICD-10-CM

## 2020-06-08 DIAGNOSIS — Z79.4 TYPE 2 DIABETES MELLITUS WITH DIABETIC POLYNEUROPATHY, WITH LONG-TERM CURRENT USE OF INSULIN (HCC): ICD-10-CM

## 2020-06-08 DIAGNOSIS — E11.69 DIABETES MELLITUS TYPE 2 IN OBESE (HCC): ICD-10-CM

## 2020-06-08 DIAGNOSIS — D50.8 OTHER IRON DEFICIENCY ANEMIA: ICD-10-CM

## 2020-06-08 DIAGNOSIS — Z79.899 LONG TERM USE OF DRUG: ICD-10-CM

## 2020-06-08 DIAGNOSIS — IMO0002 UNCONTROLLED TYPE 2 DIABETES MELLITUS WITH DIABETIC POLYNEUROPATHY, WITH LONG-TERM CURRENT USE OF INSULIN: Primary | ICD-10-CM

## 2020-06-08 PROCEDURE — 83036 HEMOGLOBIN GLYCOSYLATED A1C: CPT

## 2020-06-08 PROCEDURE — 36415 COLL VENOUS BLD VENIPUNCTURE: CPT

## 2020-06-08 PROCEDURE — 99213 OFFICE O/P EST LOW 20 MIN: CPT | Performed by: STUDENT IN AN ORGANIZED HEALTH CARE EDUCATION/TRAINING PROGRAM

## 2020-06-08 RX ORDER — PROMETHAZINE HYDROCHLORIDE 25 MG/1
25 TABLET ORAL EVERY 6 HOURS PRN
Qty: 30 TABLET | Refills: 0 | Status: SHIPPED | OUTPATIENT
Start: 2020-06-08 | End: 2020-08-05 | Stop reason: SDUPTHER

## 2020-06-08 RX ORDER — CYCLOBENZAPRINE HCL 10 MG
10 TABLET ORAL 2 TIMES DAILY PRN
Qty: 60 TABLET | Refills: 5 | Status: SHIPPED | OUTPATIENT
Start: 2020-06-08 | End: 2020-09-02 | Stop reason: SDUPTHER

## 2020-06-08 RX ORDER — FERROUS SULFATE 325(65) MG
1 TABLET ORAL
Qty: 30 TABLET | Refills: 3 | Status: SHIPPED | OUTPATIENT
Start: 2020-06-08 | End: 2020-09-02 | Stop reason: SDUPTHER

## 2020-06-08 RX ORDER — GABAPENTIN 800 MG/1
800 TABLET ORAL 3 TIMES DAILY
Qty: 90 TABLET | Refills: 2 | Status: SHIPPED | OUTPATIENT
Start: 2020-06-08 | End: 2020-09-04 | Stop reason: SDUPTHER

## 2020-06-08 RX ORDER — LISINOPRIL 10 MG/1
10 TABLET ORAL DAILY
Qty: 30 TABLET | Refills: 5 | Status: SHIPPED | OUTPATIENT
Start: 2020-06-08 | End: 2020-09-02 | Stop reason: SDUPTHER

## 2020-06-08 RX ORDER — PANTOPRAZOLE SODIUM 40 MG/1
40 TABLET, DELAYED RELEASE ORAL DAILY
Qty: 30 TABLET | Refills: 6 | Status: SHIPPED | OUTPATIENT
Start: 2020-06-08 | End: 2020-09-02 | Stop reason: SDUPTHER

## 2020-06-08 RX ORDER — CLOPIDOGREL BISULFATE 75 MG/1
75 TABLET ORAL DAILY
Qty: 30 TABLET | Refills: 5 | Status: SHIPPED | OUTPATIENT
Start: 2020-06-08 | End: 2020-09-02 | Stop reason: SDUPTHER

## 2020-06-08 NOTE — PROGRESS NOTES
I have reviewed the notes, assessments, and/or procedures performed by Dr. Jf Lozada , I concur with his  documentation and assessment and plan for Yamileth Slater        This document has been electronically signed by Helder Lee MD on June 8, 2020 16:52

## 2020-06-08 NOTE — PROGRESS NOTES
FAMILY MEDICINE  RESIDENCY CLINIC PROGRESS NOTE  Jf Lozada Jr, MD  Subjective   SUBJECTIVE:   CC: Med Refill and Diabetes     Yamileth Slater is a 60 y.o. female who [facility] for diabetic neuropathy & low dose CT scan follow-up.   Patient has had T2DM with associated neuropathy for years.  Associated symptoms include pain & burning of the feet.  These symptoms are improved with gabapentin.  Patient has had 1 positive drug screening in the past for Cannabis approximately 6 months ago, we discussed that a second positive drug screen would be cause to ban her from receiving scheduled medications at this practice.       Low Dose CT of Lungs F/U -- patient had low dose CT of lungs in December 2019.  Findings included 2 nodules, one in the right lower lobe posterior basilar segment & the second is in the right upper lobe anterior segment.  Recommendation per the radiologist was for follow-up CT in 12 months.     I have reviewed the patient's medical, family, and social history in detail;there are no changes to the history as noted in the electronic medical record.   Concurrent Medical History:  Past Medical History:   Diagnosis Date   • Anxiety states    • Asthma    • Carotid artery stenosis     DWIGHT 0-15%, LICA 0-15%. LICA stent 5/2014.   • Chronic folliculitis    • Chronic obstructive lung disease (CMS/HCC)    • Coronary arteriosclerosis    • Diabetes mellitus (CMS/HCC)     no retinopathy; A1C 9.1      • Dyslipidemia    • Essential hypertension    • Excoriated eczema     asteosis/keratosis   • GERD (gastroesophageal reflux disease)    • History of colon polyps    • Hypertensive disorder    • Kidney stone    • Mixed hyperlipidemia    • Morbid obesity due to excess calories (CMS/HCC)     BMI 44.5   • Neurologic disorder associated with diabetes mellitus (CMS/HCC)    • Non-healing surgical wound     LLE EVHa   • Peripheral vascular disease (CMS/HCC)    • Sleep  apnea    • Tobacco dependence syndrome     1/2ppd      • Type 2 diabetes mellitus (CMS/HCC)    • Vitamin D deficiency      Past Surgical History:   Procedure Laterality Date   • CARDIAC CATHETERIZATION  08/09/2013    Severe multivessel CAD with critical lesions noted in the RCA, obtuse marginal two, ramus intermemdious, and LAD as described above. Normal LV systolic function with no wall motion abnormality.    • CORONARY ARTERY BYPASS GRAFT  11/15/2013    CABG x 3 with LIMA to LAD and coronary endarterectomy to LAD, SVG to Ramus intermedius branch and SVG to PDA.    • INCISION AND DRAINAGE ABSCESS  02/18/2014    Incision and Drainage of abscess of left lower leg    • OTHER SURGICAL HISTORY  04/18/2014    ear/neck - L attempted CEA, Neck Dissection    • CARDIAC CATHETERIZATION  05/27/2014     LEFT Carotid Stent    • CAROTID STENT Left 05/27/2014   • ENDOSCOPY  09/23/2015     Esophageal stricture present.Normal stomach.Normal duodenum.    • ENDOSCOPY  11/16/2015    w/ dilatation - Esophageal stricture present,Dilatation performed.Normal stomach and duodenum.    • INCISION AND DRAINAGE ABSCESS  01/02/2016    Incision and drainage of right perineal abscess.    • OTHER SURGICAL HISTORY  01/25/2016    DESTRUCTION OF BENIGN LESION      • COLONOSCOPY  05/02/2016    Normal colon.Diverticulosis in the sigmoid colon.No specimens collected.    • CYSTOSCOPY URETEROSCOPY LASER LITHOTRIPSY     • HYSTERECTOMY     • TUBAL ABDOMINAL LIGATION       Current Outpatient Medications on File Prior to Visit   Medication Sig   • albuterol (ACCUNEB) 0.63 MG/3ML nebulizer solution Take 3 mL by nebulization Every 6 (Six) Hours As Needed for Wheezing.   • albuterol sulfate  (90 Base) MCG/ACT inhaler Inhale 2 puffs Every 4 (Four) Hours As Needed for Wheezing.   • atorvastatin (LIPITOR) 20 MG tablet TAKE 1 TABLET BY MOUTH DAILY.   • betamethasone valerate (VALISONE) 0.1 % cream Apply  topically to the appropriate area as directed Daily.   •  Blood Glucose Monitoring Suppl ([employer] BLOOD GLUCOSE METER) W/DEVICE kit 1 each 3 (Three) Times a Day.   • cetirizine (zyrTEC) 10 MG tablet Take 1 tablet by mouth Daily.   • furosemide (LASIX) 40 MG tablet TAKE 1 TABLET BY MOUTH DAILY.   • glucose blood test strip Use as instructed   • Insulin Pen Needle (NOVOFINE) 30G X 8 MM misc As directed 4 times daily   • ipratropium-albuterol (DUO-NEB) 0.5-2.5 mg/3 ml nebulizer Take 3 mL by nebulization 4 (Four) Times a Day. ICD10 code J96.01   • LANTUS SOLOSTAR 100 UNIT/ML injection pen INJECT 95 UNITS UNDER THE SKIN INTO THE APPROPRIATE AREA AS DIRECTED EVERY 12 (TWELVE) HOURS.   • LANTUS SOLOSTAR 100 UNIT/ML injection pen INJECT 95 UNITS UNDER THE SKIN INTO THE APPROPRIATE AREA AS DIRECTED EVERY 12 (TWELVE) HOURS.   • lidocaine (LIDODERM) 5 % Place 1 patch on the skin as directed by provider Daily. Remove & Discard patch within 12 hours or as directed by MD   • lidocaine (XYLOCAINE) 5 % ointment Apply  topically to the appropriate area as directed Every 2 (Two) Hours As Needed for Mild Pain .   • metFORMIN (GLUCOPHAGE) 1000 MG tablet Take 1 tablet by mouth 2 (Two) Times a Day With Meals.   • metFORMIN (GLUMETZA) 1000 MG (MOD) 24 hr tablet TAKE ONE TABLET BY MOUTH TWO TIMES A DAY   • metoprolol tartrate (LOPRESSOR) 50 MG tablet TAKE 1 TABLET BY MOUTH EVERY 12 (TWELVE) HOURS.   • montelukast (SINGULAIR) 10 MG tablet TAKE 1 TABLET BY MOUTH EVERY NIGHT.   • naproxen (NAPROSYN) 500 MG tablet Take 1 tablet by mouth 2 (Two) Times a Day With Meals.   • nicotine (NICODERM CQ) 21 MG/24HR patch Place 1 patch on the skin as directed by provider Daily.   • nitroglycerin (NITROSTAT) 0.4 MG SL tablet Place 1 tablet under the tongue As Needed for Chest Pain. Take no more than 3 doses in 15 minutes.   • nystatin (MYCOSTATIN) 940605 UNIT/GM powder Apply  topically to the appropriate area as directed 3 (Three) Times a Day.   • ondansetron ODT (ZOFRAN ODT) 4 MG disintegrating tablet Take 1 tablet  by mouth Every 12 (Twelve) Hours As Needed for Nausea or Vomiting.   • potassium chloride (MICRO-K) 10 MEQ CR capsule TAKE 1 CAPSULE BY MOUTH EVERY NIGHT.   • SYMBICORT 160-4.5 MCG/ACT inhaler INHALE 2 PUFFS 2 (TWO) TIMES A DAY.   • VICTOZA 18 MG/3ML solution pen-injector injection INJECT 1.2 MG UNDER THE SKIN INTO THE APPROPRIATE AREA AS DIRECTED EVERY MORNING.   • [DISCONTINUED] clopidogrel (PLAVIX) 75 MG tablet TAKE 1 TABLET BY MOUTH DAILY.   • [DISCONTINUED] cyclobenzaprine (FLEXERIL) 10 MG tablet TAKE 1 TABLET BY MOUTH 2 (TWO) TIMES A DAY AS NEEDED FOR MUSCLE SPASMS.   • [DISCONTINUED] Empagliflozin (JARDIANCE) 10 MG tablet Take 10 mg by mouth Every Morning.   • [DISCONTINUED] FEROSUL 325 (65 Fe) MG tablet TAKE 1 TABLET BY MOUTH DAILY WITH BREAKFAST.   • [DISCONTINUED] gabapentin (NEURONTIN) 800 MG tablet Take 1 tablet by mouth 3 (Three) Times a Day. For neuropathy   • [DISCONTINUED] lisinopril (PRINIVIL,ZESTRIL) 10 MG tablet TAKE 1 TABLET BY MOUTH DAILY.   • [DISCONTINUED] pantoprazole (PROTONIX) 40 MG EC tablet TAKE 1 TABLET BY MOUTH DAILY.   • [DISCONTINUED] promethazine (PHENERGAN) 25 MG tablet Take 1 tablet by mouth Every 6 (Six) Hours As Needed for Nausea or Vomiting.     No current facility-administered medications on file prior to visit.      She is allergic to adhesive tape and other.    family history includes Cancer in her mother and sister; Heart disease in her brother, father, mother, and sister.  She  reports that she has been smoking cigarettes. She has a 40.00 pack-year smoking history. She has never used smokeless tobacco. She reports that she has current or past drug history. Drugs: LSD, Marijuana, and Methamphetamines. She reports that she does not drink alcohol.      Review of Systems General: negative for - fatigue or weight loss  PULM: no cough, shortness of breath, or wheezing  CV: cough   MSK: Negative for gait disturbance, joint pain, joint swelling or muscular weakness  GI: no  "abdominal pain, change in bowel habits, or black or bloody stools  NEURO: no TIA or stroke symptoms; No confusion, dizziness, HA or seizures    Objective   OBJECTIVE:   /88 (BP Location: Left arm, Patient Position: Sitting, Cuff Size: Adult)   Pulse 86   Ht 157.5 cm (62\")   Wt 129 kg (285 lb)   LMP  (LMP Unknown)   SpO2 96%   BMI 52.13 kg/m²    Physical Exam GEN: Well developed, in no acute distress  HEENT: NCAT, without lesions or deformities  CV: Normal S1/S2, no murmurs or friction rubs  PULM: Normal effort, without wheezes or rhonchi  GI: Soft, non-tender & non-distended; +BSx4  Ext: Full ROM, without edema or deformities  Neuro: AxO x 3, normal gait  Psych: appropriate mood & affect    DATA REVIEWED:  Lab Results   Component Value Date    WBC 8.31 10/28/2018    HGB 13.4 10/28/2018    HCT 39.8 10/28/2018    MCV 86.1 10/28/2018     10/28/2018     Lab Results   Component Value Date    GLUCOSE 279 (H) 01/10/2020    BUN 37 (H) 01/10/2020    CREATININE 1.71 (H) 01/10/2020    EGFRIFNONA 30 (L) 01/10/2020    BCR 21.6 01/10/2020    K 4.9 01/10/2020    CO2 17.7 (L) 01/10/2020    CALCIUM 9.1 01/10/2020    ALBUMIN 3.30 (L) 09/13/2018    AST 37 (H) 09/13/2018    ALT 36 09/13/2018     Lab Results   Component Value Date    CHOL 121 04/04/2018    CHLPL 153 11/09/2016    TRIG 203 (H) 04/04/2018    HDL 35 (L) 04/04/2018    LDL 47 04/04/2018     -- Preventive Medicine Topics --   Health Maintenance Topics with due status: Overdue       Topic Date Due    ZOSTER VACCINE 03/10/2009    MEDICARE ANNUAL WELLNESS 08/24/2016    DIABETIC EYE EXAM 08/24/2016    URINE MICROALBUMIN 10/05/2018    LIPID PANEL 04/04/2019    DIABETIC FOOT EXAM 12/26/2019    PAP SMEAR 01/04/2020   WEIGHT: Patient's Body mass index is 52.13 kg/m². BMI is above normal parameters. Recommendations include: educational material.    TOBACCO: Ms. Slater  reports that she has been smoking cigarettes. She has a 40.00 pack-year smoking history. She has " never used smokeless tobacco.  Yamileth Slater  reports that she has been smoking cigarettes. She has a 40.00 pack-year smoking history. She has never used smokeless tobacco.. I have educated her on the risk of diseases from using tobacco products such as cancer, COPD and heart diease.     I advised her to quit and she is not willing to quit.    I spent 3  minutes counseling the patient.         ALCOHOL: Ms. Slater  reports that she does not drink alcohol.  ILLICIT DRUGS: Ms. Slater  reports that she has current or past drug history. Drugs: LSD, Marijuana, and Methamphetamines.  Goals        Lifestyle    • Quit smoking / using tobacco        Result Component    • Fasting Blood Glucose       Barriers: compliance with diet          Assessment/Plan   ASSESSMENT & PLAN:      Diagnosis Plan   1. Uncontrolled type 2 diabetes mellitus with diabetic polyneuropathy, with long-term current use of insulin (CMS/HCC)  Discussed with patient that she will need to get UDS before next refill.  She has had one positive drug screen in the past for Cannabis & we discussed if she has another that we will no longer be able to write scheduled prescriptions for her.      gabapentin (NEURONTIN) 800 MG tablet   2. Long term use of drug  Urine Drug Screen - Urine, Clean Catch          3. Other iron deficiency anemia  ferrous sulfate (FeroSul) 325 (65 FE) MG tablet   4. Diabetes mellitus type 2 in obese (CMS/HCC)  lisinopril (PRINIVIL,ZESTRIL) 10 MG tablet   5. Uncontrolled type 2 diabetes mellitus with complication, with long-term current use of insulin (CMS/HCC)  Empagliflozin (Jardiance) 10 MG tablet          Patient Instructions   Follow-up: No follow-ups on file.     Future Appointments   Date Time Provider Department Center   6/24/2020  9:30 AM Eugenio Barragan MD PhD [facility] None      RISK SCORE: 3    ______________________________________  Jf Lozada Jr., M.D. PGY3  Family Practice Resident  [address] [place] [address]  Phone: [phone]  Fax: [phone]  ¯¯¯¯¯¯¯¯¯¯¯¯¯¯¯¯¯¯¯¯¯¯¯¯¯¯¯    This document has been electronically signed by  Jf Lozada Jr, MD on 06/08/2020 2:44 PM

## 2020-06-09 LAB — HBA1C MFR BLD: 7.1 % (ref 4.8–5.6)

## 2020-06-23 DIAGNOSIS — R07.9 CHEST PAIN, UNSPECIFIED TYPE: Primary | ICD-10-CM

## 2020-06-24 ENCOUNTER — LAB (OUTPATIENT)
Dept: LAB | Facility: HOSPITAL | Age: 60
End: 2020-06-24

## 2020-06-24 ENCOUNTER — OFFICE VISIT (OUTPATIENT)
Dept: CARDIOLOGY | Facility: CLINIC | Age: 60
End: 2020-06-24

## 2020-06-24 ENCOUNTER — DOCUMENTATION (OUTPATIENT)
Dept: CARDIOLOGY | Facility: CLINIC | Age: 60
End: 2020-06-24

## 2020-06-24 VITALS
WEIGHT: 293 LBS | HEIGHT: 62 IN | BODY MASS INDEX: 53.92 KG/M2 | OXYGEN SATURATION: 99 % | SYSTOLIC BLOOD PRESSURE: 158 MMHG | DIASTOLIC BLOOD PRESSURE: 82 MMHG | HEART RATE: 88 BPM

## 2020-06-24 DIAGNOSIS — I25.110 CORONARY ARTERY DISEASE INVOLVING NATIVE CORONARY ARTERY OF NATIVE HEART WITH UNSTABLE ANGINA PECTORIS (HCC): Primary | ICD-10-CM

## 2020-06-24 DIAGNOSIS — I65.23 BILATERAL CAROTID ARTERY STENOSIS: ICD-10-CM

## 2020-06-24 DIAGNOSIS — I10 ESSENTIAL HYPERTENSION: ICD-10-CM

## 2020-06-24 DIAGNOSIS — E66.01 MORBID OBESITY WITH BMI OF 50.0-59.9, ADULT (HCC): ICD-10-CM

## 2020-06-24 DIAGNOSIS — R06.09 DYSPNEA ON EXERTION: ICD-10-CM

## 2020-06-24 DIAGNOSIS — F17.200 TOBACCO DEPENDENCE SYNDROME: ICD-10-CM

## 2020-06-24 DIAGNOSIS — I44.4 LAFB (LEFT ANTERIOR FASCICULAR BLOCK): ICD-10-CM

## 2020-06-24 DIAGNOSIS — R06.09 DYSPNEA ON EXERTION: Primary | ICD-10-CM

## 2020-06-24 DIAGNOSIS — I73.9 PERIPHERAL VASCULAR DISEASE (HCC): ICD-10-CM

## 2020-06-24 DIAGNOSIS — I25.110 CORONARY ARTERY DISEASE INVOLVING NATIVE CORONARY ARTERY OF NATIVE HEART WITH UNSTABLE ANGINA PECTORIS (HCC): ICD-10-CM

## 2020-06-24 DIAGNOSIS — Z79.899 LONG TERM USE OF DRUG: ICD-10-CM

## 2020-06-24 DIAGNOSIS — J44.9 OSA AND COPD OVERLAP SYNDROME (HCC): ICD-10-CM

## 2020-06-24 DIAGNOSIS — I87.2 VENOUS INSUFFICIENCY: ICD-10-CM

## 2020-06-24 DIAGNOSIS — E78.2 MIXED HYPERLIPIDEMIA: ICD-10-CM

## 2020-06-24 DIAGNOSIS — G47.33 OSA AND COPD OVERLAP SYNDROME (HCC): ICD-10-CM

## 2020-06-24 LAB
ALBUMIN SERPL-MCNC: 3.9 G/DL (ref 3.5–5.2)
ALBUMIN/GLOB SERPL: 1.1 G/DL
ALP SERPL-CCNC: 131 U/L (ref 39–117)
ALT SERPL W P-5'-P-CCNC: 25 U/L (ref 1–33)
AMPHET+METHAMPHET UR QL: NEGATIVE
AMPHETAMINES UR QL: NEGATIVE
ANION GAP SERPL CALCULATED.3IONS-SCNC: 13 MMOL/L (ref 5–15)
AST SERPL-CCNC: 36 U/L (ref 1–32)
BARBITURATES UR QL SCN: NEGATIVE
BENZODIAZ UR QL SCN: NEGATIVE
BILIRUB SERPL-MCNC: <0.2 MG/DL (ref 0.2–1.2)
BUN BLD-MCNC: 32 MG/DL (ref 8–23)
BUN/CREAT SERPL: 18.2 (ref 7–25)
BUPRENORPHINE SERPL-MCNC: NEGATIVE NG/ML
CALCIUM SPEC-SCNC: 9.2 MG/DL (ref 8.6–10.5)
CANNABINOIDS SERPL QL: NEGATIVE
CHLORIDE SERPL-SCNC: 108 MMOL/L (ref 98–107)
CHOLEST SERPL-MCNC: 168 MG/DL (ref 0–200)
CO2 SERPL-SCNC: 20 MMOL/L (ref 22–29)
COCAINE UR QL: NEGATIVE
CREAT BLD-MCNC: 1.76 MG/DL (ref 0.57–1)
DEPRECATED RDW RBC AUTO: 47 FL (ref 37–54)
ERYTHROCYTE [DISTWIDTH] IN BLOOD BY AUTOMATED COUNT: 14.9 % (ref 12.3–15.4)
GFR SERPL CREATININE-BSD FRML MDRD: 29 ML/MIN/1.73
GLOBULIN UR ELPH-MCNC: 3.5 GM/DL
GLUCOSE BLD-MCNC: 66 MG/DL (ref 65–99)
HCT VFR BLD AUTO: 40.5 % (ref 34–46.6)
HDLC SERPL-MCNC: 43 MG/DL (ref 40–60)
HGB BLD-MCNC: 12.8 G/DL (ref 12–15.9)
LDLC SERPL CALC-MCNC: 47 MG/DL (ref 0–100)
LDLC/HDLC SERPL: 1.1 {RATIO}
MCH RBC QN AUTO: 27.7 PG (ref 26.6–33)
MCHC RBC AUTO-ENTMCNC: 31.6 G/DL (ref 31.5–35.7)
MCV RBC AUTO: 87.7 FL (ref 79–97)
METHADONE UR QL SCN: NEGATIVE
NT-PROBNP SERPL-MCNC: 362.9 PG/ML (ref 5–900)
OPIATES UR QL: NEGATIVE
OXYCODONE UR QL SCN: NEGATIVE
PCP UR QL SCN: NEGATIVE
PLATELET # BLD AUTO: 286 10*3/MM3 (ref 140–450)
PMV BLD AUTO: 9.1 FL (ref 6–12)
POTASSIUM BLD-SCNC: 4.9 MMOL/L (ref 3.5–5.2)
PROPOXYPH UR QL: NEGATIVE
PROT SERPL-MCNC: 7.4 G/DL (ref 6–8.5)
RBC # BLD AUTO: 4.62 10*6/MM3 (ref 3.77–5.28)
SODIUM BLD-SCNC: 141 MMOL/L (ref 136–145)
TRICYCLICS UR QL SCN: NEGATIVE
TRIGL SERPL-MCNC: 388 MG/DL (ref 0–150)
VLDLC SERPL-MCNC: 77.6 MG/DL
WBC NRBC COR # BLD: 10.91 10*3/MM3 (ref 3.4–10.8)

## 2020-06-24 PROCEDURE — G0480 DRUG TEST DEF 1-7 CLASSES: HCPCS | Performed by: STUDENT IN AN ORGANIZED HEALTH CARE EDUCATION/TRAINING PROGRAM

## 2020-06-24 PROCEDURE — 83880 ASSAY OF NATRIURETIC PEPTIDE: CPT | Performed by: INTERNAL MEDICINE

## 2020-06-24 PROCEDURE — 36415 COLL VENOUS BLD VENIPUNCTURE: CPT | Performed by: INTERNAL MEDICINE

## 2020-06-24 PROCEDURE — 80306 DRUG TEST PRSMV INSTRMNT: CPT

## 2020-06-24 PROCEDURE — 80061 LIPID PANEL: CPT | Performed by: INTERNAL MEDICINE

## 2020-06-24 PROCEDURE — 85027 COMPLETE CBC AUTOMATED: CPT | Performed by: INTERNAL MEDICINE

## 2020-06-24 PROCEDURE — 93000 ELECTROCARDIOGRAM COMPLETE: CPT | Performed by: INTERNAL MEDICINE

## 2020-06-24 PROCEDURE — 80053 COMPREHEN METABOLIC PANEL: CPT | Performed by: INTERNAL MEDICINE

## 2020-06-24 PROCEDURE — 99205 OFFICE O/P NEW HI 60 MIN: CPT | Performed by: INTERNAL MEDICINE

## 2020-06-24 RX ORDER — ATORVASTATIN CALCIUM 40 MG/1
40 TABLET, FILM COATED ORAL DAILY
Qty: 90 TABLET | Refills: 0 | Status: SHIPPED | OUTPATIENT
Start: 2020-06-24 | End: 2020-08-05 | Stop reason: SDUPTHER

## 2020-06-24 RX ORDER — SODIUM CHLORIDE 9 MG/ML
75 INJECTION, SOLUTION INTRAVENOUS CONTINUOUS
Status: CANCELLED | OUTPATIENT
Start: 2020-07-14

## 2020-06-24 RX ORDER — ASPIRIN 81 MG/1
81 TABLET ORAL DAILY
Qty: 31 TABLET | Refills: 6
Start: 2020-06-24 | End: 2020-09-02 | Stop reason: SDUPTHER

## 2020-06-24 NOTE — PROGRESS NOTES
C and C scheduled. Patient given date. All instructions including details about pre op covid swab sent to patient via mail service. Patient is to call me once she receives and reviews them to discuss. Patient verbalizes understanding.

## 2020-06-24 NOTE — PROGRESS NOTES
Pulmonary Arterial Hypertension, Heart Failure & Valvular Heart Disease      Eugenio Barragan M.D., Ph.D., [facility]         Jayson Montelongo MD  [address] [place] [id number]    Thank you for asking me to see Yamileth Slater for chest pain.    History of Present Illness  This is a 60 y.o. female with:    1. Dyspnea  2. ASCAD with CABG: LIMA to LAD, SVG to RI and PDA  3. Risks: DM, HTN, HLD, PAD: CHINO (Failed CEA, Dr. Aguayo), CVI, Obese, Smoker, MIKE with CPAP non-compliance      Yamileth Slater is a 60 y.o. female who presents for consultation today.  The patient is typically seen by Nirmal Calvillo MD.    Yamileth Slater is a 60 y.o. female who presents for evaluation of chest pain syndrome. The patient tells me that the pain began few month(s) ago. The quality is described as  heaviness and stabbing. It is located entire anterior chest . It occurs only with exertion. There was not sudden onset of maximal intensity. There is not any sensation of ripping, tearing.   The patient reports exertional dyspnea.  The duration is described as intermittent (> 30 minutes). The pain radiates to the both arms and back.. The patient denies and reports interscapular pain. The patient  denies  recent  hoarseness.  These symptoms have been occurring a few times a week. The patient has had the following associated symptoms: diaphoresis and shortness of breath.  The patient denies any epigastric pain or abdominal pain.  The patient denies  back pain.  The patient also denies migrating pain down the back.  The patient has not vomited.. The patient denies syncope.  The patient  denies neurological symptoms including seizure, syncope, limb paresthesias or paresis.  There has not been flank pain.  The patient denies dysphagia, distorted vision, stridor, headache, nasal stuffiness, known pleural effusions or lightheadedness.  Overall, the patient states these symptoms have been improving.  Precipitating factors: walking.  Factors which relieve the discomfort are rest and inhalers/oxygen.      Previous diagnostic testing for coronary artery disease includes: cardiac catheterization.  The patient underwent basic coronary angiography was found to have multivessel CAD.  The patient had a CABG.  He subsequently lost to follow-up.  The patient's prior cardiac history:  Previous history of cardiac disease includes CABG. Patient denies history of CHF, pericarditis and valvular disease.   The patient denies a personal history of genetic syndromes associated with TAA.  The patient has not been diagnosed with ectasia of any portion of the aorta, or overt aneurysmal disease. The patient also denies prior diagnosis of CANDIE or IMH.   The patient denies  a family history of aneurysmal disease and a first-degree or second-degree relative.  The patient denies  a history of aortic valve disease or being diagnosed with BAV.  The patient denies  recent aortic manipulation.  The patient reports previous cardiovascular surgery.  The patient  denies  prior diagnoses of syphilis, any form of arteritis such as Takayasu's giant cell.   Drug use: denied..     Jeffrey Slater is a 60 y.o. female who presents for evaluation of shortness of breath. Dyspnea has been moderate. . Episodes usually occur all day and have been unchanged. Associated symptoms include: cough with no sputum. The symptoms are aggravated by climbing stairs, dust, pollen, mold, exercise, exertion, salt intake and smoke, and relieved by rest.  The patient denies  history of VTE. The patient reports smoking. The patient reports history of CHF, CAD or MI. The patient reports pulmonary conditions or exposures. The patient reports weight gain. The patient reports LE edema. The patient denies  PND, orthopnea. The patient reports snoring. The patient reports prior sleep evaluation. The patient reports prior PFTs or pulmonary evaluation.      VASCULAR  Yamileth Slater presents for evaluation for carotid artery disease PAD. Patient complains of pain in the BLE. The pain is described as aching, and is 4/10 in intensity. The pain is intermittent.  He was diagnosed with documented carotid artery disease.  She did not follow-up.  She  is also been diagnosed with PAD.  She reports this pain in her right lower extremity.    The following portions of the patient's history were reviewed and updated as appropriate: allergies, current medications, past family history, past medical history, past social history, past surgical history and problem list.      Review of Systems - Review of Systems   Cardiovascular: Positive for chest pain, claudication, dyspnea on exertion and leg swelling. Negative for irregular heartbeat, near-syncope, orthopnea, palpitations, paroxysmal nocturnal dyspnea and syncope.   Respiratory: Positive for cough, shortness of breath, sleep disturbances due to breathing, snoring and wheezing. Negative for sputum production.         All other systems were reviewed and were negative.    family history includes Cancer in her mother and sister; Heart disease in her brother, father, mother, and sister.     reports that she has been smoking cigarettes. She has a 40.00 pack-year smoking history. She has never used smokeless tobacco. She reports that she has current or past drug history. Drugs: LSD, Marijuana, and Methamphetamines. She reports that she does not drink alcohol.    Allergies   Allergen Reactions   • Adhesive Tape Hives   • Other      Bandaids, MRSA, SURECLOSE         Current Outpatient Medications:   •  albuterol (ACCUNEB) 0.63 MG/3ML nebulizer solution, Take 3 mL by nebulization Every 6 (Six) Hours As Needed for Wheezing., Disp: 20 vial, Rfl: 0  •  albuterol sulfate  (90 Base) MCG/ACT inhaler, Inhale 2 puffs Every 4 (Four) Hours As Needed for Wheezing., Disp: 1 inhaler, Rfl: 0  •  atorvastatin (LIPITOR) 20 MG tablet, TAKE 1  TABLET BY MOUTH DAILY., Disp: 90 tablet, Rfl: 3  •  betamethasone valerate (VALISONE) 0.1 % cream, Apply  topically to the appropriate area as directed Daily., Disp: 45 g, Rfl: 2  •  Blood Glucose Monitoring Suppl ([employer] BLOOD GLUCOSE METER) W/DEVICE kit, 1 each 3 (Three) Times a Day., Disp: 1 kit, Rfl: 3  •  cetirizine (zyrTEC) 10 MG tablet, Take 1 tablet by mouth Daily., Disp: 30 tablet, Rfl: 3  •  clopidogrel (PLAVIX) 75 MG tablet, Take 1 tablet by mouth Daily., Disp: 30 tablet, Rfl: 5  •  cyclobenzaprine (FLEXERIL) 10 MG tablet, Take 1 tablet by mouth 2 (Two) Times a Day As Needed for Muscle Spasms., Disp: 60 tablet, Rfl: 5  •  Empagliflozin (Jardiance) 10 MG tablet, Take 10 mg by mouth Every Morning., Disp: 90 tablet, Rfl: 3  •  ferrous sulfate (FeroSul) 325 (65 FE) MG tablet, Take 1 tablet by mouth Daily With Breakfast., Disp: 30 tablet, Rfl: 3  •  furosemide (LASIX) 40 MG tablet, TAKE 1 TABLET BY MOUTH DAILY., Disp: 30 tablet, Rfl: 3  •  gabapentin (NEURONTIN) 800 MG tablet, Take 1 tablet by mouth 3 (Three) Times a Day. For neuropathy, Disp: 90 tablet, Rfl: 2  •  glucose blood test strip, Use as instructed, Disp: 100 each, Rfl: 12  •  Insulin Pen Needle (NOVOFINE) 30G X 8 MM misc, As directed 4 times daily, Disp: 100 each, Rfl: 11  •  ipratropium-albuterol (DUO-NEB) 0.5-2.5 mg/3 ml nebulizer, Take 3 mL by nebulization 4 (Four) Times a Day. ICD10 code J96.01, Disp: 360 mL, Rfl: 0  •  LANTUS SOLOSTAR 100 UNIT/ML injection pen, INJECT 95 UNITS UNDER THE SKIN INTO THE APPROPRIATE AREA AS DIRECTED EVERY 12 (TWELVE) HOURS., Disp: 60 mL, Rfl: 11  •  LANTUS SOLOSTAR 100 UNIT/ML injection pen, INJECT 95 UNITS UNDER THE SKIN INTO THE APPROPRIATE AREA AS DIRECTED EVERY 12 (TWELVE) HOURS., Disp: 60 mL, Rfl: 11  •  lidocaine (LIDODERM) 5 %, Place 1 patch on the skin as directed by provider Daily. Remove & Discard patch within 12 hours or as directed by MD, Disp: 15 each, Rfl: 0  •  lidocaine (XYLOCAINE) 5 % ointment, Apply   topically to the appropriate area as directed Every 2 (Two) Hours As Needed for Mild Pain ., Disp: 1 tube, Rfl: 0  •  lisinopril (PRINIVIL,ZESTRIL) 10 MG tablet, Take 1 tablet by mouth Daily., Disp: 30 tablet, Rfl: 5  •  metFORMIN (GLUCOPHAGE) 1000 MG tablet, Take 1 tablet by mouth 2 (Two) Times a Day With Meals., Disp: 60 tablet, Rfl: 5  •  metFORMIN (GLUMETZA) 1000 MG (MOD) 24 hr tablet, TAKE ONE TABLET BY MOUTH TWO TIMES A DAY, Disp: 60 tablet, Rfl: 5  •  metoprolol tartrate (LOPRESSOR) 50 MG tablet, TAKE 1 TABLET BY MOUTH EVERY 12 (TWELVE) HOURS., Disp: 60 tablet, Rfl: 5  •  montelukast (SINGULAIR) 10 MG tablet, TAKE 1 TABLET BY MOUTH EVERY NIGHT., Disp: 30 tablet, Rfl: 5  •  naproxen (NAPROSYN) 500 MG tablet, Take 1 tablet by mouth 2 (Two) Times a Day With Meals., Disp: 60 tablet, Rfl: 3  •  nicotine (NICODERM CQ) 21 MG/24HR patch, Place 1 patch on the skin as directed by provider Daily., Disp: 42 patch, Rfl: 0  •  nitroglycerin (NITROSTAT) 0.4 MG SL tablet, Place 1 tablet under the tongue As Needed for Chest Pain. Take no more than 3 doses in 15 minutes., Disp: 30 tablet, Rfl: 3  •  nystatin (MYCOSTATIN) 966335 UNIT/GM powder, Apply  topically to the appropriate area as directed 3 (Three) Times a Day., Disp: 15 g, Rfl: 1  •  ondansetron ODT (ZOFRAN ODT) 4 MG disintegrating tablet, Take 1 tablet by mouth Every 12 (Twelve) Hours As Needed for Nausea or Vomiting., Disp: 30 tablet, Rfl: 0  •  pantoprazole (PROTONIX) 40 MG EC tablet, Take 1 tablet by mouth Daily., Disp: 30 tablet, Rfl: 6  •  potassium chloride (MICRO-K) 10 MEQ CR capsule, TAKE 1 CAPSULE BY MOUTH EVERY NIGHT., Disp: 30 capsule, Rfl: 3  •  promethazine (PHENERGAN) 25 MG tablet, Take 1 tablet by mouth Every 6 (Six) Hours As Needed for Nausea or Vomiting., Disp: 30 tablet, Rfl: 0  •  SYMBICORT 160-4.5 MCG/ACT inhaler, INHALE 2 PUFFS 2 (TWO) TIMES A DAY., Disp: 10.2 g, Rfl: 6  •  VICTOZA 18 MG/3ML solution pen-injector injection, INJECT 1.2 MG UNDER THE  "SKIN INTO THE APPROPRIATE AREA AS DIRECTED EVERY MORNING., Disp: 6 mL, Rfl: 3      Physical Exam:  Vitals:    06/24/20 0928   BP: 158/82   BP Location: Left arm   Patient Position: Sitting   Cuff Size: Large Adult   Pulse: 88   SpO2: 99%   Weight: 133 kg (293 lb 3.2 oz)   Height: 157.5 cm (62\")   PainSc: 0-No pain   Body mass index is 53.63 kg/m².    Pulse Pressure: high  Pulse Ox: Normal  on room air  General: alert, appears stated age and cooperative     Body Habitus: morbidly obese    HEENT: Head: Normocephalic, no lesions, without obvious abnormality. No arcus senilis, xanthelasma or xanthomas.  No malar flush, proptosis, xanthomas or malar flush.   Neuro: alert, oriented x3  speech normal in context and clarity  memory intact grossly  Pulses: 2+ and symmetric  JVP: Volume/Pulsation: Normal.  Normal waveforms with a, x. Significant V waves.   Appropriate inspiratory decrease.  No Kussmaul's. No Ion's.   Normal x and y descent.  Carotid Exam: no bruit and CEA scar normal pulsation bilaterally   Carotid Volume: normal.     Subclavian Bruit: absent  Vertebral Bruit: absent  Respirations: no increased work of breathing   Chest:  scar midline sternotomy    Pulmonary:Normal     Precordium: Normal impulses. P2 is not palpable.  RV Heave: absent  LV Heave: absent  Benton:  normal size and placement  Palpable S4: absent.  Heart rate: normal    Heart Rhythm: regular     Heart Sounds: S1: normal intensity  S2: increased P2  S3: absent   S4: present  Opening Snap: absent    A2-OS:  no  Pericardial Rub:  Absent: Yes     Ejection click: None      Murmurs:  present    Murmur 1 Type: systolic  Grade: 2/6    Timing: pansystolic  Location:   [facility]   Description:ejection  Radiation:  nonradiating  Murmur 2 Type: systolic  Grade: 1/6    Timing: early systolic  Location:  base   Description:ejection  Radiation:  nonradiating  Extremity: moves all extremities equally.   Edema: Chronic venous stasis changes.   LE Skin: no " petechiae, no nodules, no jaundice, no purpura  LE Pulses: pedal pulses grade 1 bilateral, capillary refill 5 seconds        DATA REVIEWED:     EKG. I personally reviewed and interpreted the EKG.        TTE/WIL:  Results for orders placed during the hospital encounter of 04/16/17   Adult Transthoracic Echo Complete    Narrative · Left ventricular function is normal.  · The study is technically difficult for diagnosis  · Estimated EF appears to be in the range of 56 - 60%.  · Left ventricular diastolic function is normal.          Note from Dr. Davis:     Coronary artery disease status post coronary artery bypass surgery       in 2013, received LIMA to LAD and endarterectomy of the left       anterior descending coronary artery, saphenous vein graft to ramus       intermedius and saphenous vein graft to posterior descending       artery.  5.   Peripheral vascular disease status post left carotid stenting and       stent placement to the right lower extremity.  6.   Gastroesophageal reflux.      Laboratory evaluations:    Lab Results   Component Value Date    GLUCOSE 279 (H) 01/10/2020    BUN 37 (H) 01/10/2020    CREATININE 1.71 (H) 01/10/2020    EGFRIFNONA 30 (L) 01/10/2020    BCR 21.6 01/10/2020    K 4.9 01/10/2020    CO2 17.7 (L) 01/10/2020    CALCIUM 9.1 01/10/2020    ALBUMIN 3.30 (L) 09/13/2018    AST 37 (H) 09/13/2018    ALT 36 09/13/2018     Lab Results   Component Value Date    WBC 8.31 10/28/2018    HGB 13.4 10/28/2018    HCT 39.8 10/28/2018    MCV 86.1 10/28/2018     10/28/2018     Lab Results   Component Value Date    CHOL 121 04/04/2018    CHLPL 153 11/09/2016    TRIG 203 (H) 04/04/2018    HDL 35 (L) 04/04/2018    LDL 47 04/04/2018     Lab Results   Component Value Date    TSH 5.280 (H) 04/16/2017    THYROIDAB 12 09/24/2014     Lab Results   Component Value Date    CKTOTAL 49 08/26/2017    CKMB 0.93 08/26/2017    TROPONINI <0.012 10/28/2018     Lab Results   Component Value Date    HGBA1C 7.10  (H) 06/08/2020     Lab Results   Component Value Date    DDIMER 492 (H) 09/27/2016     Lab Results   Component Value Date    ALT 36 09/13/2018     Lab Results   Component Value Date    HGBA1C 7.10 (H) 06/08/2020    HGBA1C 6.76 (H) 01/10/2020    HGBA1C 7.32 (H) 03/28/2019     Lab Results   Component Value Date    MICROALBUR 39.2 10/05/2017    CREATININE 1.71 (H) 01/10/2020     Lab Results   Component Value Date    IRON 28 (L) 04/16/2017    TIBC 389 04/16/2017    FERRITIN 8.40 (L) 04/16/2017     Lab Results   Component Value Date    INR 0.92 09/13/2018    INR 0.93 08/26/2017    INR 1.00 04/16/2017    PROTIME 12.2 09/13/2018    PROTIME 12.4 08/26/2017    PROTIME 13.1 04/16/2017       Assessment/Plan     1. Dyspnea on exertion.  I suspect this is multifactorial.  However, we need to exclude progressive coronary artery disease.  She has numerous risk factors for the development of congestive heart failure, PAH and right-sided failure.  She does have documented MIKE which was quite severe, but with CPAP noncompliance.  Fortunately, she continues to smoke.    The indications, risks and benefits of diagnostic right  heart cardiac catheterization, angiography, conscious sedation, and possible blood transfusion were discussed in detail with the patient. The increased risks that are present with pulmonary arterial hypertension with right heart catheterization were emphasized. I have cited a complication rate of 1.1% with total adverse events. This includes a 0.3% risk of inpatient admission due to a complication. I have cited a 0.5% risk of fatality. This includes the risk of PA perforation. I have cited a risk of hematoma, vagal reactions and pneumothorax as <1%. Pulmonary vasoreactivity testing, if indicated, can cause hypotension, bronchospasm, chest pain and SOB. Pulmonary angiography, if indicated, can rarely cause bronchospasm and one reported death has been cited due to intrapulmonary hemorrhage. I have also discussed  the possible risks, though low, of heart block and the need for emergency venous pacing. Additionally, I went over that if a rare complication requires a thoracotomy or median sternotomy that this would be performed emergently by CTS. The patient is willing to proceed. ASA class is ASA 3 - Patient with moderate systemic disease with functional limitations; Mallampati is III (soft and hard palate and base of uvula visible).  -RHC, RUE access, possible VD challenge  - Adult Transthoracic Echo Complete W/ Cont if Necessary Per Protocol  - Walking Oximetry  - Pulmonary Function Test  - BNP  - CBC (No Diff)  -6MWT    2. Coronary artery disease involving native coronary artery of native heart with unstable angina pectoris (CMS/HCC).  She has chest pain complaints.  Revascularization.  She presents with typical chest pain that is only exertional.  Recommended that she add the addition of aspirin 81 mg.  Will increase atorvastatin to 40 mg.  Recommend invasive coronary angiography given her high-risk.  - Comprehensive Metabolic Panel  - Lipid Panel  -Start ASA 81mg  -Continue Plavix  -Atorvastatin INCREASE to 40mg  -Labs    3. Bilateral carotid artery stenosis  - Duplex Carotid Ultrasound CAR  - Ambulatory Referral to Cardiothoracic Surgery    4. Peripheral vascular disease (CMS/HCC)  - Doppler Ankle Brachial Index Single Level CAR  - Ambulatory Referral to Cardiothoracic Surgery    5. Venous insufficiency  - Duplex Venous Lower Extremity - Bilateral CAR  - Ambulatory Referral to Cardiothoracic Surgery    6. Cardiac Risk Assessments based on 2019 ACCF guidelines:  A team-based care approach is recommended for the control of risk factors associated with ASCAD.  As such, Yamileth Slater was requested to have ongoing follow-up with their PCP. A PCP can provide the detailed monitoring that is required for management of risk factors. Essential HTN is a significant risk factor for stroke, heart disease and vascular disease.  I've recommended the patient continue current medications, if any, as prescribed by the primary care provider. A diet emphasizing intake of vegetables, fruits, nuts, whole grains and fish is recommended. Physical activity recommendations were provided by literature. They were also provided with information regarding maintaining a healthy weight, heart-healthy dietary pattern and DASH information.  Goal blood pressure less than 130/80.  The patient's BMI is recommended to be calculated at least annually.  The patient's BMI is Body mass index is 53.63 kg/m²..  Tobacco status is assessed at every visit based on established guidelines.  The patient's nicotine status: Social History    Tobacco Use      Smoking status: Current Every Day Smoker        Packs/day: 1.00        Years: 40.00        Pack years: 40        Types: Cigarettes      Smokeless tobacco: Never Used      Nicotine/tobacco cessation was advised. Risks discussed. Hand-out given. Time: 3 minutes.     7. 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 In older populations without overt cardiovascular disease, LAFB does convey an increased risk of CHF (142%), AF (89%) and even SCD (57%).  However, there seems to be no increased risk for LBBB or requirement for pacing.  The specific EKG finding does not require any specific follow-up other than clinical follow-up.  -Will plan on EKG at next visit    8. MIKE.  -Sleep referral    Return in about 1 month (around 7/24/2020).

## 2020-06-29 LAB — GABAPENTIN UR-MCNC: 704.6 UG/ML

## 2020-07-11 PROCEDURE — U0003 INFECTIOUS AGENT DETECTION BY NUCLEIC ACID (DNA OR RNA); SEVERE ACUTE RESPIRATORY SYNDROME CORONAVIRUS 2 (SARS-COV-2) (CORONAVIRUS DISEASE [COVID-19]), AMPLIFIED PROBE TECHNIQUE, MAKING USE OF HIGH THROUGHPUT TECHNOLOGIES AS DESCRIBED BY CMS-2020-01-R: HCPCS | Performed by: INTERNAL MEDICINE

## 2020-07-11 PROCEDURE — C9803 HOPD COVID-19 SPEC COLLECT: HCPCS | Performed by: INTERNAL MEDICINE

## 2020-07-12 LAB
COVID LABCORP PRIORITY: NORMAL
SARS-COV-2 RNA RESP QL NAA+PROBE: NOT DETECTED

## 2020-07-14 ENCOUNTER — HOSPITAL ENCOUNTER (OUTPATIENT)
Facility: HOSPITAL | Age: 60
Setting detail: HOSPITAL OUTPATIENT SURGERY
Discharge: HOME | End: 2020-07-14
Attending: INTERNAL MEDICINE | Admitting: INTERNAL MEDICINE

## 2020-07-14 VITALS
OXYGEN SATURATION: 94 % | DIASTOLIC BLOOD PRESSURE: 59 MMHG | HEIGHT: 62 IN | SYSTOLIC BLOOD PRESSURE: 123 MMHG | RESPIRATION RATE: 20 BRPM | HEART RATE: 92 BPM | TEMPERATURE: 97.5 F | BODY MASS INDEX: 51.6 KG/M2 | WEIGHT: 280.43 LBS

## 2020-07-14 DIAGNOSIS — I25.110 CORONARY ARTERY DISEASE INVOLVING NATIVE CORONARY ARTERY OF NATIVE HEART WITH UNSTABLE ANGINA PECTORIS (HCC): ICD-10-CM

## 2020-07-14 DIAGNOSIS — R06.09 DYSPNEA ON EXERTION: ICD-10-CM

## 2020-07-14 DIAGNOSIS — N18.30 CKD (CHRONIC KIDNEY DISEASE) STAGE 3, GFR 30-59 ML/MIN (HCC): Primary | ICD-10-CM

## 2020-07-14 PROBLEM — I27.20 PULMONARY HYPERTENSION: Status: ACTIVE | Noted: 2020-07-14

## 2020-07-14 LAB
ANION GAP SERPL CALCULATED.3IONS-SCNC: 12 MMOL/L (ref 5–15)
BUN SERPL-MCNC: 36 MG/DL (ref 8–23)
BUN/CREAT SERPL: 18.9 (ref 7–25)
CALCIUM SPEC-SCNC: 8.9 MG/DL (ref 8.6–10.5)
CHLORIDE SERPL-SCNC: 91 MMOL/L (ref 98–107)
CO2 SERPL-SCNC: 23 MMOL/L (ref 22–29)
CREAT SERPL-MCNC: 1.9 MG/DL (ref 0.57–1)
DEPRECATED RDW RBC AUTO: 42.7 FL (ref 37–54)
ERYTHROCYTE [DISTWIDTH] IN BLOOD BY AUTOMATED COUNT: 14 % (ref 12.3–15.4)
GFR SERPL CREATININE-BSD FRML MDRD: 27 ML/MIN/1.73
GLUCOSE BLDC GLUCOMTR-MCNC: 322 MG/DL (ref 70–130)
GLUCOSE BLDC GLUCOMTR-MCNC: 393 MG/DL (ref 70–130)
GLUCOSE BLDC GLUCOMTR-MCNC: 433 MG/DL (ref 70–130)
GLUCOSE BLDC GLUCOMTR-MCNC: 500 MG/DL (ref 70–130)
GLUCOSE BLDC GLUCOMTR-MCNC: 500 MG/DL (ref 70–130)
GLUCOSE BLDC GLUCOMTR-MCNC: 547 MG/DL (ref 70–130)
GLUCOSE BLDC GLUCOMTR-MCNC: 553 MG/DL (ref 70–130)
GLUCOSE SERPL-MCNC: 631 MG/DL (ref 65–99)
HCT VFR BLD AUTO: 38.3 % (ref 34–46.6)
HGB BLD-MCNC: 12.4 G/DL (ref 12–15.9)
INR PPP: 1.05 (ref 0.8–1.2)
MCH RBC QN AUTO: 27.1 PG (ref 26.6–33)
MCHC RBC AUTO-ENTMCNC: 32.4 G/DL (ref 31.5–35.7)
MCV RBC AUTO: 83.8 FL (ref 79–97)
OXYGEN SATURATION, PULMONARY ARTERY: 60.1 %
PLATELET # BLD AUTO: 263 10*3/MM3 (ref 140–450)
PMV BLD AUTO: 10 FL (ref 6–12)
POTASSIUM SERPL-SCNC: 4.7 MMOL/L (ref 3.5–5.2)
PROTHROMBIN TIME: 14.2 SECONDS (ref 11.1–15.3)
RBC # BLD AUTO: 4.57 10*6/MM3 (ref 3.77–5.28)
SODIUM SERPL-SCNC: 126 MMOL/L (ref 136–145)
WBC # BLD AUTO: 11.42 10*3/MM3 (ref 3.4–10.8)
WHOLE BLOOD HOLD SPECIMEN: NORMAL

## 2020-07-14 PROCEDURE — C1894 INTRO/SHEATH, NON-LASER: HCPCS | Performed by: INTERNAL MEDICINE

## 2020-07-14 PROCEDURE — 80048 BASIC METABOLIC PNL TOTAL CA: CPT | Performed by: INTERNAL MEDICINE

## 2020-07-14 PROCEDURE — 93451 RIGHT HEART CATH: CPT | Performed by: INTERNAL MEDICINE

## 2020-07-14 PROCEDURE — 82810 BLOOD GASES O2 SAT ONLY: CPT | Performed by: INTERNAL MEDICINE

## 2020-07-14 PROCEDURE — 85610 PROTHROMBIN TIME: CPT | Performed by: INTERNAL MEDICINE

## 2020-07-14 PROCEDURE — 63710000001 INSULIN REGULAR HUMAN PER 5 UNITS: Performed by: INTERNAL MEDICINE

## 2020-07-14 PROCEDURE — 82962 GLUCOSE BLOOD TEST: CPT

## 2020-07-14 PROCEDURE — 63710000001 INSULIN DETEMIR PER 5 UNITS: Performed by: INTERNAL MEDICINE

## 2020-07-14 PROCEDURE — 85027 COMPLETE CBC AUTOMATED: CPT | Performed by: INTERNAL MEDICINE

## 2020-07-14 RX ORDER — LIDOCAINE HYDROCHLORIDE 20 MG/ML
INJECTION, SOLUTION INFILTRATION; PERINEURAL AS NEEDED
Status: DISCONTINUED | OUTPATIENT
Start: 2020-07-14 | End: 2020-07-14 | Stop reason: HOSPADM

## 2020-07-14 RX ORDER — SODIUM CHLORIDE 9 MG/ML
75 INJECTION, SOLUTION INTRAVENOUS CONTINUOUS
Status: DISCONTINUED | OUTPATIENT
Start: 2020-07-14 | End: 2020-07-14 | Stop reason: HOSPADM

## 2020-07-14 RX ORDER — ACETAMINOPHEN 325 MG/1
650 TABLET ORAL ONCE
Status: COMPLETED | OUTPATIENT
Start: 2020-07-14 | End: 2020-07-14

## 2020-07-14 RX ORDER — DEXTROSE MONOHYDRATE 25 G/50ML
25 INJECTION, SOLUTION INTRAVENOUS
Status: DISCONTINUED | OUTPATIENT
Start: 2020-07-14 | End: 2020-07-14 | Stop reason: HOSPADM

## 2020-07-14 RX ORDER — POTASSIUM CHLORIDE 750 MG/1
10 CAPSULE, EXTENDED RELEASE ORAL DAILY
COMMUNITY
End: 2020-08-05 | Stop reason: SDUPTHER

## 2020-07-14 RX ORDER — NICOTINE POLACRILEX 4 MG
15 LOZENGE BUCCAL
Status: DISCONTINUED | OUTPATIENT
Start: 2020-07-14 | End: 2020-07-14 | Stop reason: HOSPADM

## 2020-07-14 RX ADMIN — SODIUM CHLORIDE 75 ML/HR: 9 INJECTION, SOLUTION INTRAVENOUS at 09:16

## 2020-07-14 RX ADMIN — INSULIN DETEMIR 60 UNITS: 100 INJECTION, SOLUTION SUBCUTANEOUS at 12:37

## 2020-07-14 RX ADMIN — HUMAN INSULIN 15 UNITS: 100 INJECTION, SOLUTION SUBCUTANEOUS at 12:36

## 2020-07-14 RX ADMIN — HUMAN INSULIN 10 UNITS: 100 INJECTION, SOLUTION SUBCUTANEOUS at 11:29

## 2020-07-14 RX ADMIN — ACETAMINOPHEN 650 MG: 325 TABLET, FILM COATED ORAL at 15:18

## 2020-07-14 RX ADMIN — HUMAN INSULIN 20 UNITS: 100 INJECTION, SOLUTION SUBCUTANEOUS at 13:48

## 2020-07-14 NOTE — DISCHARGE INSTRUCTIONS
Pulmonary Artery Catheterization  Pulmonary artery catheterization is a procedure that is used to test blood movement through the heart and to monitor the heart's function. In this procedure, a thin, flexible tube (catheter) is passed into the right side of the heart and into the main artery that carries blood from your heart to your lungs (pulmonary artery). The procedure may be used to evaluate or help diagnose various problems, such as:  · Heart failure.  · Shock.  · Leaky heart valves (valvular regurgitation).  · Congenital heart disease.  · Burns.  · Kidney disease.  · High blood pressure within the arteries in the lungs (pulmonary hypertension).  · A buildup of fluid around the heart that prevents the heart from functioning normally (cardiac tamponade).  · A disease that causes the heart muscle to become rigid (restrictive cardiomyopathy).  · Abnormal blood flow between two areas of the heart (shunt).  After a heart attack, this procedure may be used to monitor for further problems and to see if medicines are working.  The procedure may be done in a cardiac catheterization lab or in an intensive care unit (ICU).  LET YOUR HEALTH CARE PROVIDER KNOW ABOUT:  · Any allergies you have.  · All medicines you are taking, including vitamins, herbs, eye drops, creams, and over-the-counter medicines.  · Previous problems you or members of your family have had with the use of anesthetics.  · Any blood disorders you have.  · Previous surgeries you have had.  · Any medical conditions you may have.  RISKS AND COMPLICATIONS  Generally, this is a safe procedure. However, problems may occur, including:  · Bruising or bleeding at the catheter insertion site.  · Injury to the vein where the catheter was inserted.  · Puncture to the lung. This is a risk if neck or chest veins are used.  The following problems may also occur, but they are very rare:  · Abnormal heart rhythms.  · Low blood pressure.  · Infection.  · Cardiac  tamponade.  · Blocked blood vessel caused by a blood clot or foreign material circulating in the blood (embolism). This can be caused by blood clots at the tip of the catheter.  BEFORE THE PROCEDURE  · Follow instructions from your health care provider about eating or drinking restrictions.  · Ask your health care provider about:    Changing or stopping your regular medicines. This is especially important if you are taking diabetes medicines or blood thinners.    Taking medicines such as aspirin and ibuprofen. These medicines can thin your blood. Do not take these medicines before your procedure if your health care provider instructs you not to.  PROCEDURE  · An IV tube will be inserted into one of your veins.  · You may be given a medicine that helps you relax (sedative).  · The area of your body that is chosen for insertion of the catheter will be cleaned. This is usually the neck or groin, but the insertion is sometimes done in another area.    You will be given a medicine that numbs this area (local anesthetic).    A small incision will be made in a vein in this area.  · A catheter will be inserted through the incision and into the vein. The health care provider will carefully move the catheter into the upper chamber of the heart (right atrium). X-rays may be used to help guide the catheter to the right place.  · The catheter will be threaded through two heart valves (tricuspid valve and pulmonary valve) and placed into the pulmonary artery.  · As soon as the catheter is in place, the blood pressure in the pulmonary artery will be measured.  · During the procedure, your heart's rhythm will be watched constantly using an electrocardiogram (ECG).  · The catheter will be removed after tests and monitoring have been completed.  The procedure may vary among health care providers and hospitals.  AFTER THE PROCEDURE  · CALL MD WITH ANY QUESTIONS CONCERNS OR WORSENING SYMPTOMS. IF OFFICE IS CLOSED CALL HOSPITAL   [phone]     This information is not intended to replace advice given to you by your health care provider. Make sure you discuss any questions you have with your health care provider.     Document Released: 04/22/2008 Document Revised: 05/03/2016 Document Reviewed: 12/22/2015  [employer]vier Interactive Patient Education ©2016 [employer]

## 2020-07-14 NOTE — H&P
[facility] Cardiology  HISTORY AND PHYSICAL  Yamileth Slater  60 y.o. female    Chief complaint -  Chest pain    History of Present Illness:  This is a 60-year-old lady with history of known coronary artery disease with history of CABG with a LIMA to LAD, SVG to ramus intermedius, SVG to PDA in 2013, also has a history of significant peripheral arterial disease and stenting to her carotid artery, diabetes, hypertension, CKD stage III, COPD, morbid obesity, obstructive sleep apnea, history of esophageal dilation was recently seen in clinic by Dr. Barragan with chest pain.  Concerning for potential progression of her disease she was referred here for cardiac catheter further evaluation.  Unfortunately patient is not a very good candidate for nonischemic evaluation of her body habitus and in the setting of clinical presentation she was referred for cardiac cath.  However she has CKD and her creatinine is trending up.  I discussed with Dr. Barragan and the patient, the setting of uptrending creatinine will plan on postponing the left heart cath.  We will refer her to nephrology, once her creatinine stabilizes and cleared by nephrology will plan performing a cardiac cath at that time.          Allergies   Allergen Reactions   • Adhesive Tape Hives   • Other      Bandaids, MRSA, SURECLOSE         Past Medical History:   Diagnosis Date   • Anxiety states    • Asthma    • Carotid artery stenosis     DWIGHT 0-15%, LICA 0-15%. LICA stent 5/2014.   • Chronic folliculitis    • Chronic obstructive lung disease (CMS/HCC)    • Coronary arteriosclerosis    • Diabetes mellitus (CMS/HCC)     no retinopathy; A1C 9.1      • Dyslipidemia    • Essential hypertension    • Excoriated eczema     asteosis/keratosis   • GERD (gastroesophageal reflux disease)    • History of colon polyps    • Hypertensive disorder    • Kidney stone    • Mixed hyperlipidemia    • Morbid obesity due to excess calories (CMS/[facility])     BMI  44.5   • Neurologic disorder associated with diabetes mellitus (CMS/HCC)    • Non-healing surgical wound     LLE EVHa   • Peripheral vascular disease (CMS/HCC)    • Sleep apnea    • Tobacco dependence syndrome     1/2ppd      • Type 2 diabetes mellitus (CMS/HCC)    • Vitamin D deficiency          Past Surgical History:   Procedure Laterality Date   • CARDIAC CATHETERIZATION  08/09/2013    Severe multivessel CAD with critical lesions noted in the RCA, obtuse marginal two, ramus intermemdious, and LAD as described above. Normal LV systolic function with no wall motion abnormality.    • CARDIAC CATHETERIZATION  05/27/2014     LEFT Carotid Stent    • CAROTID STENT Left 05/27/2014   • COLONOSCOPY  05/02/2016    Normal colon.Diverticulosis in the sigmoid colon.No specimens collected.    • CORONARY ARTERY BYPASS GRAFT  11/15/2013    CABG x 3 with LIMA to LAD and coronary endarterectomy to LAD, SVG to Ramus intermedius branch and SVG to PDA.    • CYSTOSCOPY URETEROSCOPY LASER LITHOTRIPSY     • ENDOSCOPY  09/23/2015     Esophageal stricture present.Normal stomach.Normal duodenum.    • ENDOSCOPY  11/16/2015    w/ dilatation - Esophageal stricture present,Dilatation performed.Normal stomach and duodenum.    • HYSTERECTOMY     • INCISION AND DRAINAGE ABSCESS  01/02/2016    Incision and drainage of right perineal abscess.    • INCISION AND DRAINAGE ABSCESS  02/18/2014    Incision and Drainage of abscess of left lower leg    • OTHER SURGICAL HISTORY  01/25/2016    DESTRUCTION OF BENIGN LESION      • OTHER SURGICAL HISTORY  04/18/2014    ear/neck - L attempted CEA, Neck Dissection    • TUBAL ABDOMINAL LIGATION           Family History   Problem Relation Age of Onset   • Heart disease Mother    • Cancer Mother    • Heart disease Father    • Heart disease Sister    • Cancer Sister    • Heart disease Brother          Social History     Socioeconomic History   • Marital status:      Spouse name: wesley dumont   • Number of  children: Not on file   • Years of education: Not on file   • Highest education level: Not on file   Tobacco Use   • Smoking status: Current Every Day Smoker     Packs/day: 0.50     Years: 40.00     Pack years: 20.00     Types: Cigarettes   • Smokeless tobacco: Never Used   Substance and Sexual Activity   • Alcohol use: No   • Drug use: Yes     Types: LSD, Marijuana, Methamphetamines     Comment: Lasted used in 2006   • Sexual activity: Not Currently     Partners: Male         Prior to Admission medications    Medication Sig Start Date End Date Taking? Authorizing Provider   albuterol sulfate  (90 Base) MCG/ACT inhaler Inhale 2 puffs Every 4 (Four) Hours As Needed for Wheezing. 5/3/19  Yes Umesh Liz MD   atorvastatin (LIPITOR) 40 MG tablet Take 1 tablet by mouth Daily. 6/24/20  Yes Eugenio Barragan MD PhD   betamethasone valerate (VALISONE) 0.1 % cream Apply  topically to the appropriate area as directed Daily. 9/4/18  Yes Pauline Martinez MD   Blood Glucose Monitoring Suppl ([employer] BLOOD GLUCOSE METER) W/DEVICE kit 1 each 3 (Three) Times a Day. 11/10/16  Yes Yadira Delarosa MD   cetirizine (zyrTEC) 10 MG tablet Take 1 tablet by mouth Daily. 5/3/19  Yes Umesh Liz MD   clopidogrel (PLAVIX) 75 MG tablet Take 1 tablet by mouth Daily. 6/8/20  Yes Jf Lozada Jr., MD   cyclobenzaprine (FLEXERIL) 10 MG tablet Take 1 tablet by mouth 2 (Two) Times a Day As Needed for Muscle Spasms. 6/8/20  Yes Jf Lozada Jr., MD   Empagliflozin (Jardiance) 10 MG tablet Take 10 mg by mouth Every Morning. 6/8/20  Yes fJ Lozada Jr., MD   ferrous sulfate (FeroSul) 325 (65 FE) MG tablet Take 1 tablet by mouth Daily With Breakfast. 6/8/20  Yes Jf Lozada Jr., MD   furosemide (LASIX) 40 MG tablet TAKE 1 TABLET BY MOUTH DAILY. 9/20/18  Yes Pauline Martinez MD   gabapentin (NEURONTIN) 800 MG tablet Take 1 tablet by mouth 3 (Three) Times a Day. For neuropathy 6/8/20  Yes Jf Lozada Jr., MD      glucose blood test strip Use as instructed 8/6/18  Yes Pauline Martinez MD   Insulin Pen Needle (NOVOFINE) 30G X 8 MM misc As directed 4 times daily 5/3/19  Yes Umesh Liz MD   Liraglutide (Victoza) 18 MG/3ML solution pen-injector injection Inject 1.2 mg under the skin into the appropriate area as directed Daily.   Yes Caio Thompson MD   lisinopril (PRINIVIL,ZESTRIL) 10 MG tablet Take 1 tablet by mouth Daily. 6/8/20  Yes Jf Lozada Jr., MD   metoprolol tartrate (LOPRESSOR) 50 MG tablet TAKE 1 TABLET BY MOUTH EVERY 12 (TWELVE) HOURS. 2/12/20  Yes Nirmal Calvillo MD   montelukast (SINGULAIR) 10 MG tablet TAKE 1 TABLET BY MOUTH EVERY NIGHT. 5/8/20  Yes Nirmal Calvillo MD   naproxen (NAPROSYN) 500 MG tablet Take 1 tablet by mouth 2 (Two) Times a Day With Meals. 8/8/19  Yes Nirmal Calvillo MD   nicotine (NICODERM CQ) 21 MG/24HR patch Place 1 patch on the skin as directed by provider Daily. 8/8/19  Yes Nirmal Calvillo MD   nystatin (MYCOSTATIN) 388029 UNIT/GM powder Apply  topically to the appropriate area as directed 3 (Three) Times a Day. 2/6/19  Yes Helder Lee MD   pantoprazole (PROTONIX) 40 MG EC tablet Take 1 tablet by mouth Daily. 6/8/20  Yes Jf Lozada Jr., MD   potassium chloride (MICRO-K) 10 MEQ CR capsule Take 10 mEq by mouth Daily.   Yes ProviderCaio MD   albuterol (ACCUNEB) 0.63 MG/3ML nebulizer solution Take 3 mL by nebulization Every 6 (Six) Hours As Needed for Wheezing. 5/3/19   Umesh Liz MD   aspirin 81 MG EC tablet Take 1 tablet by mouth Daily. 6/24/20   Eugenio Barragan MD PhD   ipratropium-albuterol (DUO-NEB) 0.5-2.5 mg/3 ml nebulizer Take 3 mL by nebulization 4 (Four) Times a Day. ICD10 code J96.01 2/6/19   Helder Lee MD   LANTUS SOLOSTAR 100 UNIT/ML injection pen INJECT 95 UNITS UNDER THE SKIN INTO THE APPROPRIATE AREA AS DIRECTED EVERY 12 (TWELVE) HOURS. 5/13/20   Nirmal Calvillo MD   lidocaine (LIDODERM)  5 % Place 1 patch on the skin as directed by provider Daily. Remove & Discard patch within 12 hours or as directed by MD 5/3/19   Umesh Liz MD   lidocaine (XYLOCAINE) 5 % ointment Apply  topically to the appropriate area as directed Every 2 (Two) Hours As Needed for Mild Pain . 5/3/19   Umesh Liz MD   metFORMIN (GLUCOPHAGE) 1000 MG tablet Take 1 tablet by mouth 2 (Two) Times a Day With Meals. 8/8/19   Nirmal Calvillo MD   nitroglycerin (NITROSTAT) 0.4 MG SL tablet Place 1 tablet under the tongue As Needed for Chest Pain. Take no more than 3 doses in 15 minutes. 8/8/19   Nirmal Calvillo MD   ondansetron ODT (ZOFRAN ODT) 4 MG disintegrating tablet Take 1 tablet by mouth Every 12 (Twelve) Hours As Needed for Nausea or Vomiting. 12/11/19   Nirmal Calvillo MD   promethazine (PHENERGAN) 25 MG tablet Take 1 tablet by mouth Every 6 (Six) Hours As Needed for Nausea or Vomiting. 6/8/20   Jf Lozada Jr., MD   LANTUS SOLOSTAR 100 UNIT/ML injection pen INJECT 95 UNITS UNDER THE SKIN INTO THE APPROPRIATE AREA AS DIRECTED EVERY 12 (TWELVE) HOURS. 5/11/20 7/14/20  Nirmal Calvillo MD   metFORMIN (GLUMETZA) 1000 MG (MOD) 24 hr tablet TAKE ONE TABLET BY MOUTH TWO TIMES A DAY 3/5/20 7/14/20  Nirmal Calvillo MD   potassium chloride (MICRO-K) 10 MEQ CR capsule TAKE 1 CAPSULE BY MOUTH EVERY NIGHT. 4/16/20 7/14/20  Mone Us MD   SYMBICORT 160-4.5 MCG/ACT inhaler INHALE 2 PUFFS 2 (TWO) TIMES A DAY. 11/19/19 7/14/20  Nirmal Calvillo MD   VICTOZA 18 MG/3ML solution pen-injector injection INJECT 1.2 MG UNDER THE SKIN INTO THE APPROPRIATE AREA AS DIRECTED EVERY MORNING. 5/8/20 7/14/20  Nirmal Calvillo MD         Review of Systems:     Constitution: Denies any fatigue, fever or chills.  HENT: Denies any headache, hearing impairment.  Eyes: Denies any blurring of vision, or photophobia.  Cardiovascular:  As per history of present illness.   Respiratory system: Denies any COPD,  "shortness of breath.  Endocrine:  No history of hyperlipidemia, diabetes.  Musculoskeletal:  No history of arthritis with musculoskeletal problems.  Gastrointestinal: No nausea, vomiting, or melena.  Genitourinary: No dysuria or hematuria.  Neurological: No history of seizure disorder, stroke, or memory problems.    Psychiatric/Behavioral: No history of depression, bipolar disorder or schizophrenia .    Hematological: No history of easy bruising.    ROS          OBJECTIVE:    /74 (BP Location: Left arm, Patient Position: Lying)   Pulse 92   Temp 97.4 °F (36.3 °C) (Temporal)   Resp 20   Ht 157.5 cm (62\")   Wt 127 kg (280 lb 6.8 oz)   LMP  (LMP Unknown)   SpO2 95%   BMI 51.29 kg/m²       Physical Exam:   Constitutional: Patient is oriented to person, place, and time.   Skin: Warm and dry.  Well developed and nourished in no acute distress .  Head: Normocephalic and atraumatic.   Eyes: Pupils are equal, round, and reactive to light.   Neck: Neck supple. No bruit in the carotids, no elevation of JVD.  Cardiovascular: [place] in the fifth intercostal space, regular rate, and rhythm,  S1 greater than S2, no S3 or S4, no gallop.  Pulmonary/Chest: Air entry is equal on both sides.  No wheezing or crackles.  Abdominal: Soft.  No hepatosplenomegaly, bowel sounds are present.  Musculoskeletal: No kyphoscoliosis.  Neurological: Alert and oriented to person, place, and time.  Cranial nerves are intact. No motor or sensory deficit.  Extremities: No edema, no radial femoral delay.  Psychiatric: Normal mood and affect. Behavior is normal.      Lab Results (last 24 hours)     Procedure Component Value Units Date/Time    CBC (No Diff) [[id number]]  (Abnormal) Collected:  07/14/20 0910    Specimen:  Blood Updated:  07/14/20 0920     WBC 11.42 10*3/mm3      RBC 4.57 10*6/mm3      Hemoglobin 12.4 g/dL      Hematocrit 38.3 %      MCV 83.8 fL      MCH 27.1 pg      MCHC 32.4 g/dL      RDW 14.0 %      RDW-SD 42.7 fl      MPV 10.0 " fL      Platelets 263 10*3/mm3     Protime-INR [[id number]]  (Normal) Collected:  07/14/20 1019    Specimen:  Blood Updated:  07/14/20 1043     Protime 14.2 Seconds      INR 1.05    Narrative:       Therapeutic range for most indications is 2.0-3.0 INR,  or 2.5-3.5 for mechanical heart valves.    Basic Metabolic Panel [[id number]]  (Abnormal) Collected:  07/14/20 1020    Specimen:  Blood Updated:  07/14/20 1048     Glucose 631 mg/dL      BUN 36 mg/dL      Creatinine 1.90 mg/dL      Sodium 126 mmol/L      Potassium 4.7 mmol/L      Chloride 91 mmol/L      CO2 23.0 mmol/L      Calcium 8.9 mg/dL      eGFR Non African Amer 27 mL/min/1.73      BUN/Creatinine Ratio 18.9     Anion Gap 12.0 mmol/L     Narrative:       GFR Normal >60  Chronic Kidney Disease <60  Kidney Failure <15            Results for orders placed during the hospital encounter of 04/16/17   Adult Transthoracic Echo Complete    Narrative · Left ventricular function is normal.  · The study is technically difficult for diagnosis  · Estimated EF appears to be in the range of 56 - 60%.  · Left ventricular diastolic function is normal.          The 10-year ASCVD risk score (Tara DC Jr., et al., 2013) is: 27.5%    Values used to calculate the score:      Age: 60 years      Sex: Female      Is Non- : No      Diabetic: Yes      Tobacco smoker: Yes      Systolic Blood Pressure: 159 mmHg      Is BP treated: Yes      HDL Cholesterol: 43 mg/dL      Total Cholesterol: 168 mg/dL          A/P:    Postponing cardiac cath in the setting of ERIKA on CKD.      Patient's Body mass index is 51.29 kg/m². BMI is above normal parameters. Recommendations include: exercise counseling and nutrition counseling.      Yamileth Slater  reports that she has been smoking cigarettes. She has a 20.00 pack-year smoking history. She has never used smokeless tobacco..    AAA Screening:     Maryam Feliz MD  7/14/2020  10:48      Part of this note may be an electronic  transcription/translation of spoken language to printed text using the Dragon Dictation System.

## 2020-07-14 NOTE — NURSING NOTE
Patient's FSBS 393 at 1610.  informed to verify okay for discharge. Order received that patient may be discharged home.

## 2020-07-14 NOTE — TREATMENT PLAN
CV Medicine    The patient mistakenly has not been taking any of her diabetes medications including insulin since probably Sunday.  She was markedly hyperglycemic today without evidence of acidosis.  Corrected sodium was normal.  Plan was to complete the RHC and then have her go back to [facility] for ongoing management of her hyperglycemia.  We went ahead and dose Levemir and will provide IV insulin until her blood glucose is less than 400.  At the most recent check her blood glucose was becoming more acceptable, so the most recent order for 10 units of insulin was discontinued.  Patient is complaining of a headache so she was ordered Tylenol.  I have discussed with [facility] to keep her for 1 more hour and check her blood glucose one more time to make sure that her blood glucose is acceptable before discharge.  She has  been informed to restart her home regimen with close monitoring of her blood glucose and to contact her PCP for further management.    Eugenio Barragan MD PhD

## 2020-07-22 ENCOUNTER — TELEPHONE (OUTPATIENT)
Dept: FAMILY MEDICINE CLINIC | Facility: CLINIC | Age: 60
End: 2020-07-22

## 2020-08-04 RX ORDER — METOPROLOL TARTRATE 50 MG/1
50 TABLET, FILM COATED ORAL EVERY 12 HOURS SCHEDULED
Qty: 60 TABLET | Refills: 5 | Status: SHIPPED | OUTPATIENT
Start: 2020-08-04 | End: 2020-09-02 | Stop reason: SDUPTHER

## 2020-08-05 ENCOUNTER — TELEPHONE (OUTPATIENT)
Dept: FAMILY MEDICINE CLINIC | Facility: CLINIC | Age: 60
End: 2020-08-05

## 2020-08-05 DIAGNOSIS — J44.0 CHRONIC OBSTRUCTIVE PULMONARY DISEASE WITH ACUTE LOWER RESPIRATORY INFECTION (HCC): ICD-10-CM

## 2020-08-05 DIAGNOSIS — E11.69 DIABETES MELLITUS TYPE 2 IN OBESE (HCC): ICD-10-CM

## 2020-08-05 DIAGNOSIS — IMO0002 UNCONTROLLED TYPE 2 DIABETES MELLITUS WITH COMPLICATION, WITH LONG-TERM CURRENT USE OF INSULIN: ICD-10-CM

## 2020-08-05 DIAGNOSIS — J45.30 MILD PERSISTENT ASTHMA WITHOUT COMPLICATION: ICD-10-CM

## 2020-08-05 DIAGNOSIS — E66.9 DIABETES MELLITUS TYPE 2 IN OBESE (HCC): ICD-10-CM

## 2020-08-05 DIAGNOSIS — R52 PAIN: ICD-10-CM

## 2020-08-05 NOTE — TELEPHONE ENCOUNTER
PATIENT CALLED ASKING FOR A REFILL ON ALL OF HER MEDICATION.    PATIENT WOULD LIKE HER MEDS SENT TO [facility].    THANKS,  TASHI

## 2020-08-06 RX ORDER — NYSTATIN 100000 [USP'U]/G
POWDER TOPICAL 3 TIMES DAILY
Qty: 15 G | Refills: 1 | Status: SHIPPED | OUTPATIENT
Start: 2020-08-06 | End: 2020-10-09 | Stop reason: SDUPTHER

## 2020-08-06 RX ORDER — ALBUTEROL SULFATE 0.63 MG/3ML
1 SOLUTION RESPIRATORY (INHALATION) EVERY 6 HOURS PRN
Qty: 20 VIAL | Refills: 0 | Status: SHIPPED | OUTPATIENT
Start: 2020-08-06 | End: 2020-09-02 | Stop reason: SDUPTHER

## 2020-08-06 RX ORDER — NAPROXEN 500 MG/1
500 TABLET ORAL 2 TIMES DAILY WITH MEALS
Qty: 60 TABLET | Refills: 3 | Status: SHIPPED | OUTPATIENT
Start: 2020-08-06 | End: 2020-09-02 | Stop reason: SDUPTHER

## 2020-08-06 RX ORDER — LIDOCAINE 50 MG/G
1 PATCH TOPICAL
Qty: 15 EACH | Refills: 0 | Status: SHIPPED | OUTPATIENT
Start: 2020-08-06 | End: 2020-10-09 | Stop reason: SDUPTHER

## 2020-08-06 RX ORDER — BLOOD-GLUCOSE METER
1 EACH MISCELLANEOUS 3 TIMES DAILY
Qty: 1 KIT | Refills: 3 | Status: SHIPPED | OUTPATIENT
Start: 2020-08-06 | End: 2020-10-09 | Stop reason: SDUPTHER

## 2020-08-06 RX ORDER — NICOTINE 21 MG/24HR
1 PATCH, TRANSDERMAL 24 HOURS TRANSDERMAL EVERY 24 HOURS
Qty: 42 PATCH | Refills: 0 | Status: SHIPPED | OUTPATIENT
Start: 2020-08-06 | End: 2020-10-09 | Stop reason: SDUPTHER

## 2020-08-06 RX ORDER — LIDOCAINE 50 MG/G
OINTMENT TOPICAL
Qty: 1 TUBE | Refills: 0 | Status: SHIPPED | OUTPATIENT
Start: 2020-08-06 | End: 2020-10-09 | Stop reason: SDUPTHER

## 2020-08-06 RX ORDER — ATORVASTATIN CALCIUM 40 MG/1
40 TABLET, FILM COATED ORAL DAILY
Qty: 90 TABLET | Refills: 0 | Status: SHIPPED | OUTPATIENT
Start: 2020-08-06 | End: 2020-09-02 | Stop reason: SDUPTHER

## 2020-08-06 RX ORDER — PROMETHAZINE HYDROCHLORIDE 25 MG/1
25 TABLET ORAL EVERY 6 HOURS PRN
Qty: 30 TABLET | Refills: 0 | Status: SHIPPED | OUTPATIENT
Start: 2020-08-06 | End: 2020-10-09 | Stop reason: SDUPTHER

## 2020-08-06 RX ORDER — NITROGLYCERIN 0.4 MG/1
0.4 TABLET SUBLINGUAL AS NEEDED
Qty: 30 TABLET | Refills: 3 | Status: SHIPPED | OUTPATIENT
Start: 2020-08-06 | End: 2020-10-09 | Stop reason: SDUPTHER

## 2020-08-06 RX ORDER — CETIRIZINE HYDROCHLORIDE 10 MG/1
10 TABLET ORAL DAILY
Qty: 30 TABLET | Refills: 3 | Status: SHIPPED | OUTPATIENT
Start: 2020-08-06 | End: 2020-09-02 | Stop reason: SDUPTHER

## 2020-08-06 RX ORDER — ALBUTEROL SULFATE 90 UG/1
2 AEROSOL, METERED RESPIRATORY (INHALATION) EVERY 4 HOURS PRN
Qty: 1 INHALER | Refills: 0 | Status: SHIPPED | OUTPATIENT
Start: 2020-08-06 | End: 2020-09-02 | Stop reason: SDUPTHER

## 2020-08-06 RX ORDER — FUROSEMIDE 40 MG/1
40 TABLET ORAL DAILY
Qty: 30 TABLET | Refills: 3 | Status: SHIPPED | OUTPATIENT
Start: 2020-08-06 | End: 2020-09-02 | Stop reason: SDUPTHER

## 2020-08-06 RX ORDER — POTASSIUM CHLORIDE 750 MG/1
10 CAPSULE, EXTENDED RELEASE ORAL DAILY
Qty: 30 CAPSULE | Refills: 5 | Status: SHIPPED | OUTPATIENT
Start: 2020-08-06 | End: 2020-09-02 | Stop reason: SDUPTHER

## 2020-08-06 RX ORDER — MONTELUKAST SODIUM 10 MG/1
10 TABLET ORAL NIGHTLY
Qty: 30 TABLET | Refills: 5 | Status: SHIPPED | OUTPATIENT
Start: 2020-08-06 | End: 2020-09-02 | Stop reason: SDUPTHER

## 2020-08-06 RX ORDER — ONDANSETRON 4 MG/1
4 TABLET, ORALLY DISINTEGRATING ORAL EVERY 12 HOURS PRN
Qty: 30 TABLET | Refills: 0 | Status: SHIPPED | OUTPATIENT
Start: 2020-08-06 | End: 2020-10-09 | Stop reason: SDUPTHER

## 2020-08-06 RX ORDER — IPRATROPIUM BROMIDE AND ALBUTEROL SULFATE 2.5; .5 MG/3ML; MG/3ML
3 SOLUTION RESPIRATORY (INHALATION) 4 TIMES DAILY
Qty: 360 ML | Refills: 0 | Status: SHIPPED | OUTPATIENT
Start: 2020-08-06 | End: 2020-10-09 | Stop reason: SDUPTHER

## 2020-08-07 ENCOUNTER — TELEPHONE (OUTPATIENT)
Dept: FAMILY MEDICINE CLINIC | Facility: CLINIC | Age: 60
End: 2020-08-07

## 2020-08-07 NOTE — TELEPHONE ENCOUNTER
PATIENT CALLED ASKING FOR A MEDIATION REFILL ON HER gabapentin (NEURONTIN) 800 MG tablet. SHE IS COMPLETELY OUT AND WOULD LIKE THAT SENT IN TO HER PHARMACY WHEN  POSSIBLE.     CALL BACK NUMBER FOR HER [phone].    THANKS,  TASHI

## 2020-08-07 NOTE — TELEPHONE ENCOUNTER
PATIENT CALLED AND IS REALLY NEEDING A REFILL ON HER gabapentin (NEURONTIN) 800 MG tablet. SHE HAS CALLED SEVERAL TIMES AND IS REALLY NEEDING THIS FILLED. HER PHARMACY IS [facility] AND HER NUMBER TO CALL BACK [phone].      THANK YOU,      RAINER

## 2020-08-10 DIAGNOSIS — IMO0002 UNCONTROLLED TYPE 2 DIABETES MELLITUS WITH DIABETIC POLYNEUROPATHY, WITH LONG-TERM CURRENT USE OF INSULIN: ICD-10-CM

## 2020-08-10 RX ORDER — GABAPENTIN 800 MG/1
800 TABLET ORAL 3 TIMES DAILY
Qty: 90 TABLET | Refills: 0 | Status: CANCELLED | OUTPATIENT
Start: 2020-08-10

## 2020-08-10 NOTE — TELEPHONE ENCOUNTER
PATIENT CALLED ASKING FOR A REFILL ON HER gabapentin (NEURONTIN) 800 MG tablet. PATIENT RECEIVED MEDICATION REFILLS ON ALL OF HER OTHER MEDICATION, HOWEVER SHE IS STILL NEEDING THIS ONE.       THANKS,  TASHI

## 2020-08-17 ENCOUNTER — OFFICE VISIT (OUTPATIENT)
Dept: CARDIAC SURGERY | Facility: CLINIC | Age: 60
End: 2020-08-17

## 2020-08-17 ENCOUNTER — PROCEDURE VISIT (OUTPATIENT)
Dept: PULMONOLOGY | Facility: CLINIC | Age: 60
End: 2020-08-17

## 2020-08-17 VITALS
BODY MASS INDEX: 52.19 KG/M2 | HEART RATE: 97 BPM | WEIGHT: 283.6 LBS | OXYGEN SATURATION: 97 % | HEIGHT: 62 IN | DIASTOLIC BLOOD PRESSURE: 92 MMHG | SYSTOLIC BLOOD PRESSURE: 159 MMHG

## 2020-08-17 DIAGNOSIS — I27.20 PULMONARY HYPERTENSION (HCC): ICD-10-CM

## 2020-08-17 DIAGNOSIS — Z99.89 OSA ON CPAP: ICD-10-CM

## 2020-08-17 DIAGNOSIS — I25.110 CORONARY ARTERY DISEASE INVOLVING NATIVE CORONARY ARTERY OF NATIVE HEART WITH UNSTABLE ANGINA PECTORIS (HCC): ICD-10-CM

## 2020-08-17 DIAGNOSIS — G47.33 OSA ON CPAP: ICD-10-CM

## 2020-08-17 DIAGNOSIS — I65.23 BILATERAL CAROTID ARTERY STENOSIS: Primary | ICD-10-CM

## 2020-08-17 DIAGNOSIS — J42 CHRONIC BRONCHITIS, UNSPECIFIED CHRONIC BRONCHITIS TYPE (HCC): ICD-10-CM

## 2020-08-17 DIAGNOSIS — I10 ESSENTIAL HYPERTENSION: ICD-10-CM

## 2020-08-17 DIAGNOSIS — E78.2 MIXED HYPERLIPIDEMIA: ICD-10-CM

## 2020-08-17 DIAGNOSIS — I73.9 PERIPHERAL VASCULAR DISEASE (HCC): ICD-10-CM

## 2020-08-17 DIAGNOSIS — E11.42 DM TYPE 2 WITH DIABETIC PERIPHERAL NEUROPATHY (HCC): ICD-10-CM

## 2020-08-17 DIAGNOSIS — R06.09 DYSPNEA ON EXERTION: Primary | ICD-10-CM

## 2020-08-17 DIAGNOSIS — I87.2 VENOUS INSUFFICIENCY: ICD-10-CM

## 2020-08-17 DIAGNOSIS — E66.01 MORBID OBESITY WITH BMI OF 50.0-59.9, ADULT (HCC): ICD-10-CM

## 2020-08-17 PROCEDURE — 94060 EVALUATION OF WHEEZING: CPT | Performed by: INTERNAL MEDICINE

## 2020-08-17 PROCEDURE — 99204 OFFICE O/P NEW MOD 45 MIN: CPT | Performed by: THORACIC SURGERY (CARDIOTHORACIC VASCULAR SURGERY)

## 2020-08-17 PROCEDURE — 94729 DIFFUSING CAPACITY: CPT | Performed by: INTERNAL MEDICINE

## 2020-08-17 PROCEDURE — 94727 GAS DIL/WSHOT DETER LNG VOL: CPT | Performed by: INTERNAL MEDICINE

## 2020-08-17 NOTE — PROCEDURES
Pulmonary Function Test  Performed by: Aracely Patricia MD  Authorized by: Eugenio Barragan MD PhD      Pre Drug    FVC: 61%   FEV1: 68%   FEV1/FVC: 88%   T%   RV: 136%   DLCO: 81%     Post Drug    FVC: 55%   FEV1: 60%   FEV1/FVC: 86%      Change in % (calculated):    FVC: -9   FEV1: -12    Interpretation   Spirometry   Spirometry shows mild restriction. The patient's response to bronchodilators has insignificant change.   Review of FVL curve   Effort is normal.   Lung Volume Measurements  Measurements show: reduced lung volumes consistent with restriction and elevated residual volume consistent with gas trapping.   Diffusion Capacity  The patient's diffusion capacity is normal.    Overall comments: Difficulty with lung volume neighbors may make results unreliable.

## 2020-09-02 ENCOUNTER — LAB (OUTPATIENT)
Dept: LAB | Facility: HOSPITAL | Age: 60
End: 2020-09-02

## 2020-09-02 ENCOUNTER — OFFICE VISIT (OUTPATIENT)
Dept: FAMILY MEDICINE CLINIC | Facility: CLINIC | Age: 60
End: 2020-09-02

## 2020-09-02 VITALS
SYSTOLIC BLOOD PRESSURE: 130 MMHG | HEART RATE: 91 BPM | HEIGHT: 62 IN | OXYGEN SATURATION: 95 % | DIASTOLIC BLOOD PRESSURE: 72 MMHG | BODY MASS INDEX: 51.88 KG/M2 | WEIGHT: 281.9 LBS | TEMPERATURE: 97.5 F

## 2020-09-02 DIAGNOSIS — Z51.81 ENCOUNTER FOR THERAPEUTIC DRUG LEVEL MONITORING: ICD-10-CM

## 2020-09-02 DIAGNOSIS — E87.5 HYPERKALEMIA: Primary | ICD-10-CM

## 2020-09-02 DIAGNOSIS — IMO0002 UNCONTROLLED TYPE 2 DIABETES MELLITUS WITH DIABETIC POLYNEUROPATHY, WITH LONG-TERM CURRENT USE OF INSULIN: Primary | ICD-10-CM

## 2020-09-02 DIAGNOSIS — E66.9 DIABETES MELLITUS TYPE 2 IN OBESE (HCC): ICD-10-CM

## 2020-09-02 DIAGNOSIS — D50.8 OTHER IRON DEFICIENCY ANEMIA: ICD-10-CM

## 2020-09-02 DIAGNOSIS — E11.69 DIABETES MELLITUS TYPE 2 IN OBESE (HCC): ICD-10-CM

## 2020-09-02 DIAGNOSIS — J44.0 CHRONIC OBSTRUCTIVE PULMONARY DISEASE WITH ACUTE LOWER RESPIRATORY INFECTION (HCC): ICD-10-CM

## 2020-09-02 DIAGNOSIS — IMO0002 UNCONTROLLED TYPE 2 DIABETES MELLITUS WITH COMPLICATION, WITH LONG-TERM CURRENT USE OF INSULIN: ICD-10-CM

## 2020-09-02 DIAGNOSIS — R52 PAIN: ICD-10-CM

## 2020-09-02 LAB
ALBUMIN SERPL-MCNC: 4 G/DL (ref 3.5–5.2)
ALBUMIN/GLOB SERPL: 1.1 G/DL
ALP SERPL-CCNC: 143 U/L (ref 39–117)
ALT SERPL W P-5'-P-CCNC: 14 U/L (ref 1–33)
ANION GAP SERPL CALCULATED.3IONS-SCNC: 10.5 MMOL/L (ref 5–15)
AST SERPL-CCNC: 18 U/L (ref 1–32)
BASOPHILS # BLD AUTO: 0.15 10*3/MM3 (ref 0–0.2)
BASOPHILS NFR BLD AUTO: 1.3 % (ref 0–1.5)
BILIRUB SERPL-MCNC: 0.2 MG/DL (ref 0–1.2)
BUN SERPL-MCNC: 33 MG/DL (ref 8–23)
BUN/CREAT SERPL: 22.1 (ref 7–25)
CALCIUM SPEC-SCNC: 9.6 MG/DL (ref 8.6–10.5)
CHLORIDE SERPL-SCNC: 106 MMOL/L (ref 98–107)
CO2 SERPL-SCNC: 20.5 MMOL/L (ref 22–29)
CREAT SERPL-MCNC: 1.49 MG/DL (ref 0.57–1)
DEPRECATED RDW RBC AUTO: 48.9 FL (ref 37–54)
EOSINOPHIL # BLD AUTO: 1.53 10*3/MM3 (ref 0–0.4)
EOSINOPHIL NFR BLD AUTO: 13.1 % (ref 0.3–6.2)
ERYTHROCYTE [DISTWIDTH] IN BLOOD BY AUTOMATED COUNT: 16.3 % (ref 12.3–15.4)
GFR SERPL CREATININE-BSD FRML MDRD: 36 ML/MIN/1.73
GLOBULIN UR ELPH-MCNC: 3.8 GM/DL
GLUCOSE SERPL-MCNC: 60 MG/DL (ref 65–99)
HBA1C MFR BLD: 7.6 % (ref 4.8–5.6)
HCT VFR BLD AUTO: 35.5 % (ref 34–46.6)
HGB BLD-MCNC: 11.5 G/DL (ref 12–15.9)
IMM GRANULOCYTES # BLD AUTO: 0.04 10*3/MM3 (ref 0–0.05)
IMM GRANULOCYTES NFR BLD AUTO: 0.3 % (ref 0–0.5)
LYMPHOCYTES # BLD AUTO: 3.7 10*3/MM3 (ref 0.7–3.1)
LYMPHOCYTES NFR BLD AUTO: 31.7 % (ref 19.6–45.3)
MCH RBC QN AUTO: 26.9 PG (ref 26.6–33)
MCHC RBC AUTO-ENTMCNC: 32.4 G/DL (ref 31.5–35.7)
MCV RBC AUTO: 82.9 FL (ref 79–97)
MONOCYTES # BLD AUTO: 0.66 10*3/MM3 (ref 0.1–0.9)
MONOCYTES NFR BLD AUTO: 5.7 % (ref 5–12)
NEUTROPHILS NFR BLD AUTO: 47.9 % (ref 42.7–76)
NEUTROPHILS NFR BLD AUTO: 5.59 10*3/MM3 (ref 1.7–7)
NRBC BLD AUTO-RTO: 0.1 /100 WBC (ref 0–0.2)
PLATELET # BLD AUTO: 365 10*3/MM3 (ref 140–450)
PMV BLD AUTO: 9.4 FL (ref 6–12)
POTASSIUM SERPL-SCNC: 5.5 MMOL/L (ref 3.5–5.2)
PROT SERPL-MCNC: 7.8 G/DL (ref 6–8.5)
RBC # BLD AUTO: 4.28 10*6/MM3 (ref 3.77–5.28)
SODIUM SERPL-SCNC: 137 MMOL/L (ref 136–145)
WBC # BLD AUTO: 11.67 10*3/MM3 (ref 3.4–10.8)

## 2020-09-02 PROCEDURE — G0480 DRUG TEST DEF 1-7 CLASSES: HCPCS

## 2020-09-02 PROCEDURE — 36415 COLL VENOUS BLD VENIPUNCTURE: CPT

## 2020-09-02 PROCEDURE — 83036 HEMOGLOBIN GLYCOSYLATED A1C: CPT

## 2020-09-02 PROCEDURE — 85025 COMPLETE CBC W/AUTO DIFF WBC: CPT

## 2020-09-02 PROCEDURE — 80053 COMPREHEN METABOLIC PANEL: CPT

## 2020-09-02 PROCEDURE — 99213 OFFICE O/P EST LOW 20 MIN: CPT | Performed by: STUDENT IN AN ORGANIZED HEALTH CARE EDUCATION/TRAINING PROGRAM

## 2020-09-02 RX ORDER — MONTELUKAST SODIUM 10 MG/1
10 TABLET ORAL NIGHTLY
Qty: 30 TABLET | Refills: 5 | Status: SHIPPED | OUTPATIENT
Start: 2020-09-02 | End: 2020-10-09 | Stop reason: SDUPTHER

## 2020-09-02 RX ORDER — CLOPIDOGREL BISULFATE 75 MG/1
75 TABLET ORAL DAILY
Qty: 30 TABLET | Refills: 5 | Status: SHIPPED | OUTPATIENT
Start: 2020-09-02 | End: 2020-10-09 | Stop reason: SDUPTHER

## 2020-09-02 RX ORDER — CYCLOBENZAPRINE HCL 10 MG
10 TABLET ORAL 2 TIMES DAILY PRN
Qty: 60 TABLET | Refills: 5 | Status: SHIPPED | OUTPATIENT
Start: 2020-09-02 | End: 2020-10-09 | Stop reason: SDUPTHER

## 2020-09-02 RX ORDER — POTASSIUM CHLORIDE 750 MG/1
10 CAPSULE, EXTENDED RELEASE ORAL DAILY
Qty: 30 CAPSULE | Refills: 5 | Status: SHIPPED | OUTPATIENT
Start: 2020-09-02 | End: 2020-10-09 | Stop reason: SDUPTHER

## 2020-09-02 RX ORDER — CETIRIZINE HYDROCHLORIDE 10 MG/1
10 TABLET ORAL DAILY
Qty: 30 TABLET | Refills: 3 | Status: SHIPPED | OUTPATIENT
Start: 2020-09-02 | End: 2020-10-09 | Stop reason: SDUPTHER

## 2020-09-02 RX ORDER — NAPROXEN 500 MG/1
500 TABLET ORAL 2 TIMES DAILY WITH MEALS
Qty: 60 TABLET | Refills: 3 | Status: SHIPPED | OUTPATIENT
Start: 2020-09-02 | End: 2020-10-09 | Stop reason: SDUPTHER

## 2020-09-02 RX ORDER — FUROSEMIDE 40 MG/1
40 TABLET ORAL DAILY
Qty: 30 TABLET | Refills: 3 | Status: SHIPPED | OUTPATIENT
Start: 2020-09-02 | End: 2020-10-09 | Stop reason: SDUPTHER

## 2020-09-02 RX ORDER — ASPIRIN 81 MG/1
81 TABLET ORAL DAILY
Qty: 31 TABLET | Refills: 6
Start: 2020-09-02 | End: 2020-10-09 | Stop reason: SDUPTHER

## 2020-09-02 RX ORDER — ALBUTEROL SULFATE 90 UG/1
2 AEROSOL, METERED RESPIRATORY (INHALATION) EVERY 4 HOURS PRN
Qty: 18 G | Refills: 1 | Status: SHIPPED | OUTPATIENT
Start: 2020-09-02 | End: 2020-10-28

## 2020-09-02 RX ORDER — PEN NEEDLE, DIABETIC 31 GX5/16"
NEEDLE, DISPOSABLE MISCELLANEOUS
COMMUNITY
Start: 2020-08-07

## 2020-09-02 RX ORDER — METOPROLOL TARTRATE 50 MG/1
50 TABLET, FILM COATED ORAL EVERY 12 HOURS SCHEDULED
Qty: 60 TABLET | Refills: 5 | Status: SHIPPED | OUTPATIENT
Start: 2020-09-02 | End: 2020-10-06 | Stop reason: HOSPADM

## 2020-09-02 RX ORDER — SODIUM POLYSTYRENE SULFONATE 4.1 MEQ/G
POWDER, FOR SUSPENSION ORAL; RECTAL ONCE
Qty: 453.6 G | Refills: 0 | Status: SHIPPED | OUTPATIENT
Start: 2020-09-02 | End: 2020-09-02

## 2020-09-02 RX ORDER — LISINOPRIL 10 MG/1
10 TABLET ORAL DAILY
Qty: 30 TABLET | Refills: 5 | Status: SHIPPED | OUTPATIENT
Start: 2020-09-02 | End: 2020-10-09 | Stop reason: SDUPTHER

## 2020-09-02 RX ORDER — ATORVASTATIN CALCIUM 40 MG/1
40 TABLET, FILM COATED ORAL DAILY
Qty: 90 TABLET | Refills: 0 | Status: SHIPPED | OUTPATIENT
Start: 2020-09-02 | End: 2020-10-09 | Stop reason: SDUPTHER

## 2020-09-02 RX ORDER — GABAPENTIN 800 MG/1
800 TABLET ORAL 3 TIMES DAILY
Qty: 90 TABLET | Refills: 2 | Status: CANCELLED | OUTPATIENT
Start: 2020-09-02

## 2020-09-02 RX ORDER — PANTOPRAZOLE SODIUM 40 MG/1
40 TABLET, DELAYED RELEASE ORAL DAILY
Qty: 30 TABLET | Refills: 6 | Status: SHIPPED | OUTPATIENT
Start: 2020-09-02 | End: 2020-10-09 | Stop reason: SDUPTHER

## 2020-09-02 RX ORDER — FERROUS SULFATE 325(65) MG
1 TABLET ORAL
Qty: 30 TABLET | Refills: 3 | Status: SHIPPED | OUTPATIENT
Start: 2020-09-02 | End: 2020-10-09 | Stop reason: SDUPTHER

## 2020-09-02 RX ORDER — ALBUTEROL SULFATE 0.63 MG/3ML
1 SOLUTION RESPIRATORY (INHALATION) EVERY 6 HOURS PRN
Qty: 20 VIAL | Refills: 0 | Status: SHIPPED | OUTPATIENT
Start: 2020-09-02 | End: 2020-10-09 | Stop reason: SDUPTHER

## 2020-09-03 DIAGNOSIS — E66.01 MORBID (SEVERE) OBESITY DUE TO EXCESS CALORIES (HCC): Primary | ICD-10-CM

## 2020-09-03 DIAGNOSIS — N39.0 URINARY TRACT INFECTION WITHOUT HEMATURIA, SITE UNSPECIFIED: Primary | ICD-10-CM

## 2020-09-03 NOTE — PROGRESS NOTES
I have helped formulate and discussed the assessment and plan with Dr.Richard Calvillo.  I have also spoken with the patient  I have spoken with the patient.  I have reviewed the notes, assessments, and/or procedures performed by Dr. Nirmal Calvillo, I concur with his  documentation and assessment and plan for Yamilethjameson SmithNga Bryon.          This document has been electronically signed by Helder Lee MD on September 3, 2020 10:34

## 2020-09-03 NOTE — PROGRESS NOTES
Family Medicine Residency  Nirmal Calvillo MD    Subjective:     Yamileth Slater is a 60 y.o. female who presents for DM type II follow-up.  This is a chronic problem.  She is currently taking Lantus 95 units twice daily, Victoza 1.2 mg daily, and Jardiance 10 mg daily.  Fasting blood sugars are usually between 120 and 140, had one low in the 90s which resolves with orange juice.  Diet last night excessive liver and onions but hesitant to say whether fried or not.  Decreased exercise due to legs and back hurting.  Hemoglobin A1c increased from 6.76 on 1/10/2020 to 7.1 on 6/8/2020 but she states she has had multiple respiratory issues requiring repeated steroid courses.    The following portions of the patient's history were reviewed and updated as appropriate: allergies, current medications, past family history, past medical history, past social history, past surgical history and problem list.    Past Medical Hx:  Past Medical History:   Diagnosis Date   • Anxiety states    • Asthma    • Carotid artery stenosis     DWIGHT 0-15%, LICA 0-15%. LICA stent 5/2014.   • Chronic folliculitis    • Chronic obstructive lung disease (CMS/HCC)    • Coronary arteriosclerosis    • Diabetes mellitus (CMS/HCC)     no retinopathy; A1C 9.1      • Dyslipidemia    • Essential hypertension    • Excoriated eczema     asteosis/keratosis   • GERD (gastroesophageal reflux disease)    • History of colon polyps    • Hypertensive disorder    • Kidney stone    • Mixed hyperlipidemia    • Morbid obesity due to excess calories (CMS/HCC)     BMI 44.5   • Neurologic disorder associated with diabetes mellitus (CMS/HCC)    • Non-healing surgical wound     LLE EVHa   • Peripheral vascular disease (CMS/HCC)    • Sleep apnea    • Tobacco dependence syndrome     1/2ppd      • Type 2 diabetes mellitus (CMS/HCC)    • Vitamin D deficiency        Past Surgical Hx:  Past Surgical History:   Procedure Laterality Date   • CARDIAC CATHETERIZATION   08/09/2013    Severe multivessel CAD with critical lesions noted in the RCA, obtuse marginal two, ramus intermemdious, and LAD as described above. Normal LV systolic function with no wall motion abnormality.    • CARDIAC CATHETERIZATION  05/27/2014     LEFT Carotid Stent    • CARDIAC CATHETERIZATION N/A 7/14/2020    Procedure: Right Heart Cath;  Surgeon: Eugenio Barragan MD PhD;  Location: [facility] CATH INVASIVE LOCATION;  Service: Cardiology;  Laterality: N/A;   • CAROTID STENT Left 05/27/2014   • COLONOSCOPY  05/02/2016    Normal colon.Diverticulosis in the sigmoid colon.No specimens collected.    • CORONARY ARTERY BYPASS GRAFT  11/15/2013    CABG x 3 with LIMA to LAD and coronary endarterectomy to LAD, SVG to Ramus intermedius branch and SVG to PDA.    • CYSTOSCOPY URETEROSCOPY LASER LITHOTRIPSY     • ENDOSCOPY  09/23/2015     Esophageal stricture present.Normal stomach.Normal duodenum.    • ENDOSCOPY  11/16/2015    w/ dilatation - Esophageal stricture present,Dilatation performed.Normal stomach and duodenum.    • HYSTERECTOMY     • INCISION AND DRAINAGE ABSCESS  01/02/2016    Incision and drainage of right perineal abscess.    • INCISION AND DRAINAGE ABSCESS  02/18/2014    Incision and Drainage of abscess of left lower leg    • OTHER SURGICAL HISTORY  01/25/2016    DESTRUCTION OF BENIGN LESION      • OTHER SURGICAL HISTORY  04/18/2014    ear/neck - L attempted CEA, Neck Dissection    • TUBAL ABDOMINAL LIGATION         Current Meds:    Current Outpatient Medications:   •  albuterol (ACCUNEB) 0.63 MG/3ML nebulizer solution, Take 3 mL by nebulization Every 6 (Six) Hours As Needed for Wheezing., Disp: 20 vial, Rfl: 0  •  albuterol sulfate  (90 Base) MCG/ACT inhaler, Inhale 2 puffs Every 4 (Four) Hours As Needed for Wheezing., Disp: 18 g, Rfl: 1  •  aspirin (aspirin) 81 MG EC tablet, Take 1 tablet by mouth Daily., Disp: 31 tablet, Rfl: 6  •  atorvastatin (LIPITOR) 40 MG tablet, Take 1 tablet by mouth  Daily., Disp: 90 tablet, Rfl: 0  •  betamethasone valerate (VALISONE) 0.1 % cream, Apply  topically to the appropriate area as directed Daily., Disp: 45 g, Rfl: 2  •  Blood Glucose Monitoring Suppl ([employer] BLOOD GLUCOSE METER) w/Device kit, 1 each 3 (Three) Times a Day., Disp: 1 kit, Rfl: 3  •  cetirizine (zyrTEC) 10 MG tablet, Take 1 tablet by mouth Daily., Disp: 30 tablet, Rfl: 3  •  clopidogrel (PLAVIX) 75 MG tablet, Take 1 tablet by mouth Daily., Disp: 30 tablet, Rfl: 5  •  cyclobenzaprine (FLEXERIL) 10 MG tablet, Take 1 tablet by mouth 2 (Two) Times a Day As Needed for Muscle Spasms., Disp: 60 tablet, Rfl: 5  •  EASY TOUCH PEN NEEDLES 31G X 8 MM misc, , Disp: , Rfl:   •  Empagliflozin (Jardiance) 10 MG tablet, Take 10 mg by mouth Every Morning., Disp: 90 tablet, Rfl: 3  •  ferrous sulfate (FeroSul) 325 (65 FE) MG tablet, Take 1 tablet by mouth Daily With Breakfast., Disp: 30 tablet, Rfl: 3  •  furosemide (LASIX) 40 MG tablet, Take 1 tablet by mouth Daily., Disp: 30 tablet, Rfl: 3  •  gabapentin (NEURONTIN) 800 MG tablet, Take 1 tablet by mouth 3 (Three) Times a Day. For neuropathy, Disp: 90 tablet, Rfl: 2  •  glucose blood test strip, Use as instructed, Disp: 100 each, Rfl: 12  •  Insulin Glargine (Lantus SoloStar) 100 UNIT/ML injection pen, Inject 95 Units under the skin into the appropriate area as directed Every 12 (Twelve) Hours., Disp: 60 mL, Rfl: 11  •  Insulin Pen Needle (NovoFine) 30G X 8 MM misc, As directed 4 times daily, Disp: 100 each, Rfl: 11  •  ipratropium-albuterol (DUO-NEB) 0.5-2.5 mg/3 ml nebulizer, Take 3 mL by nebulization 4 (Four) Times a Day. ICD10 code J96.01, Disp: 360 mL, Rfl: 0  •  lidocaine (LIDODERM) 5 %, Place 1 patch on the skin as directed by provider Daily. Remove & Discard patch within 12 hours or as directed by MD, Disp: 15 each, Rfl: 0  •  lidocaine (XYLOCAINE) 5 % ointment, Apply  topically to the appropriate area as directed Every 2 (Two) Hours As Needed for Mild Pain ., Disp:  1 tube, Rfl: 0  •  Liraglutide (Victoza) 18 MG/3ML solution pen-injector injection, Inject 1.2 mg under the skin into the appropriate area as directed Daily., Disp: 3 mL, Rfl: 1  •  lisinopril (PRINIVIL,ZESTRIL) 10 MG tablet, Take 1 tablet by mouth Daily., Disp: 30 tablet, Rfl: 5  •  metFORMIN (GLUCOPHAGE) 1000 MG tablet, Take 1 tablet by mouth 2 (Two) Times a Day With Meals., Disp: 60 tablet, Rfl: 5  •  metoprolol tartrate (LOPRESSOR) 50 MG tablet, Take 1 tablet by mouth Every 12 (Twelve) Hours., Disp: 60 tablet, Rfl: 5  •  montelukast (SINGULAIR) 10 MG tablet, Take 1 tablet by mouth Every Night., Disp: 30 tablet, Rfl: 5  •  naproxen (NAPROSYN) 500 MG tablet, Take 1 tablet by mouth 2 (Two) Times a Day With Meals., Disp: 60 tablet, Rfl: 3  •  nicotine (Nicoderm CQ) 21 MG/24HR patch, Place 1 patch on the skin as directed by provider Daily., Disp: 42 patch, Rfl: 0  •  nitroglycerin (NITROSTAT) 0.4 MG SL tablet, Place 1 tablet under the tongue As Needed for Chest Pain. Take no more than 3 doses in 15 minutes., Disp: 30 tablet, Rfl: 3  •  nystatin (MYCOSTATIN) 614382 UNIT/GM powder, Apply  topically to the appropriate area as directed 3 (Three) Times a Day., Disp: 15 g, Rfl: 1  •  ondansetron ODT (Zofran ODT) 4 MG disintegrating tablet, Place 1 tablet on the tongue Every 12 (Twelve) Hours As Needed for Nausea or Vomiting., Disp: 30 tablet, Rfl: 0  •  pantoprazole (PROTONIX) 40 MG EC tablet, Take 1 tablet by mouth Daily., Disp: 30 tablet, Rfl: 6  •  potassium chloride (MICRO-K) 10 MEQ CR capsule, Take 1 capsule by mouth Daily., Disp: 30 capsule, Rfl: 5  •  promethazine (PHENERGAN) 25 MG tablet, Take 1 tablet by mouth Every 6 (Six) Hours As Needed for Nausea or Vomiting., Disp: 30 tablet, Rfl: 0    Allergies:  Allergies   Allergen Reactions   • Adhesive Tape Hives   • Other      Bandaids, MRSA, SURECLOSE       Family Hx:  Family History   Problem Relation Age of Onset   • Heart disease Mother    • Cancer Mother    • Heart  "disease Father    • Heart disease Sister    • Cancer Sister    • Heart disease Brother         Social History:  Social History     Socioeconomic History   • Marital status:      Spouse name: wesley dumont   • Number of children: Not on file   • Years of education: Not on file   • Highest education level: Not on file   Tobacco Use   • Smoking status: Current Every Day Smoker     Packs/day: 0.25     Years: 40.00     Pack years: 10.00     Types: Cigarettes   • Smokeless tobacco: Never Used   Substance and Sexual Activity   • Alcohol use: No   • Drug use: Yes     Types: LSD, Marijuana, Methamphetamines     Comment: Lasted used in 2006   • Sexual activity: Not Currently     Partners: Male       Review of Systems  Review of Systems   Constitutional: Negative for activity change and appetite change.   HENT: Negative for congestion and trouble swallowing.    Respiratory: Negative for chest tightness and shortness of breath.    Cardiovascular: Negative for chest pain and palpitations.   Gastrointestinal: Negative for abdominal distention and abdominal pain.   Genitourinary: Negative for difficulty urinating and dysuria.   Musculoskeletal: Positive for arthralgias, back pain and myalgias.   Skin: Negative for color change and pallor.   Neurological: Negative for dizziness, light-headedness and headaches.   Psychiatric/Behavioral: Negative for agitation and behavioral problems.       Objective:     /72   Pulse 91   Temp 97.5 °F (36.4 °C)   Ht 157.5 cm (62\")   Wt 128 kg (281 lb 14.4 oz)   LMP  (LMP Unknown)   SpO2 95%   BMI 51.56 kg/m²   Physical Exam   Constitutional: She is oriented to person, place, and time. She appears well-developed and well-nourished. No distress.   HENT:   Head: Normocephalic and atraumatic.   Right Ear: External ear normal.   Left Ear: External ear normal.   Nose: Nose normal.   Eyes: Pupils are equal, round, and reactive to light. EOM are normal.   Neck: Normal range of motion. Neck " supple.   Cardiovascular: Normal rate, regular rhythm and normal heart sounds. Exam reveals no gallop and no friction rub.   No murmur heard.  Pulmonary/Chest: Effort normal and breath sounds normal. No respiratory distress. She has no wheezes. She has no rales.   Abdominal: Soft. Bowel sounds are normal. She exhibits no distension. There is no tenderness.   Musculoskeletal: Normal range of motion. She exhibits no edema.   Neurological: She is alert and oriented to person, place, and time.   Skin: Skin is warm. Capillary refill takes less than 2 seconds. No erythema.   Psychiatric: She has a normal mood and affect. Her behavior is normal.        Assessment/Plan:     Yamileth was seen today for annual exam.    Diagnoses and all orders for this visit:    Uncontrolled type 2 diabetes mellitus with diabetic polyneuropathy, with long-term current use of insulin (CMS/[facility])  -     Empagliflozin (Jardiance) 10 MG tablet; Take 10 mg by mouth Every Morning.  -     glucose blood test strip; Use as instructed  -     Insulin Glargine (Lantus SoloStar) 100 UNIT/ML injection pen; Inject 95 Units under the skin into the appropriate area as directed Every 12 (Twelve) Hours.  -     metFORMIN (GLUCOPHAGE) 1000 MG tablet; Take 1 tablet by mouth 2 (Two) Times a Day With Meals.  -     Comprehensive metabolic panel; Future  -     CBC w AUTO Differential; Future  -     Hemoglobin A1c; Future  -     Gabapentin, Urine -; Future  - Gabapentin will be refilled. Mamadou reviewed and appropriate. Request# [id number]. Last filled 8/10/20 for 30 days.    Other iron deficiency anemia  -     ferrous sulfate (FeroSul) 325 (65 FE) MG tablet; Take 1 tablet by mouth Daily With Breakfast.    Diabetes mellitus type 2 in obese (CMS/[facility])  -     lisinopril (PRINIVIL,ZESTRIL) 10 MG tablet; Take 1 tablet by mouth Daily.    Chronic obstructive pulmonary disease with acute lower respiratory infection (CMS/[facility])  -     montelukast (SINGULAIR) 10 MG tablet; Take 1 tablet  by mouth Every Night.    Pain  -     naproxen (NAPROSYN) 500 MG tablet; Take 1 tablet by mouth 2 (Two) Times a Day With Meals.    Encounter for therapeutic drug level monitoring   -     Gabapentin, Urine -; Future    Other orders  -     albuterol (ACCUNEB) 0.63 MG/3ML nebulizer solution; Take 3 mL by nebulization Every 6 (Six) Hours As Needed for Wheezing.  -     albuterol sulfate  (90 Base) MCG/ACT inhaler; Inhale 2 puffs Every 4 (Four) Hours As Needed for Wheezing.  -     aspirin (aspirin) 81 MG EC tablet; Take 1 tablet by mouth Daily.  -     atorvastatin (LIPITOR) 40 MG tablet; Take 1 tablet by mouth Daily.  -     cetirizine (zyrTEC) 10 MG tablet; Take 1 tablet by mouth Daily.  -     clopidogrel (PLAVIX) 75 MG tablet; Take 1 tablet by mouth Daily.  -     cyclobenzaprine (FLEXERIL) 10 MG tablet; Take 1 tablet by mouth 2 (Two) Times a Day As Needed for Muscle Spasms.  -     furosemide (LASIX) 40 MG tablet; Take 1 tablet by mouth Daily.  -     metoprolol tartrate (LOPRESSOR) 50 MG tablet; Take 1 tablet by mouth Every 12 (Twelve) Hours.  -     pantoprazole (PROTONIX) 40 MG EC tablet; Take 1 tablet by mouth Daily.  -     potassium chloride (MICRO-K) 10 MEQ CR capsule; Take 1 capsule by mouth Daily.    Extensively counseled to stop smoking for 8 minutes.    · Rx changes: None.  · Patient Education: Improve diet and try to increase exercise  · Compliance at present is estimated to be excellent.   · Efforts to improve compliance (if necessary) will be directed at Unnecessary at this time.     Follow-up:     Return in about 3 months (around 12/2/2020) for DM.  To reassess.    Preventative:  Health Maintenance   Topic Date Due   • ZOSTER VACCINE (1 of 2) 03/10/2009   • MEDICARE ANNUAL WELLNESS  08/24/2016   • DIABETIC EYE EXAM  08/24/2016   • URINE MICROALBUMIN  10/05/2018   • DIABETIC FOOT EXAM  12/26/2019   • PAP SMEAR  01/04/2020   • INFLUENZA VACCINE  08/01/2020   • HEMOGLOBIN A1C  12/08/2020   • LUNG CANCER  SCREENING  01/10/2021   • COLONOSCOPY  05/02/2021   • LIPID PANEL  06/24/2021   • MAMMOGRAM  01/10/2022   • TDAP/TD VACCINES (3 - Td) 11/10/2024   • PNEUMOCOCCAL VACCINE (19-64 MEDIUM RISK)  Completed   • HEPATITIS C SCREENING  Completed       Recommended: none  Vaccine Counseling: N/A    Weight  -Class: Obese Class III extreme obesity: > or equal to 40kg/m2  -Patient's Body mass index is 51.56 kg/m². BMI is above normal parameters. Recommendations include: exercise counseling and nutrition counseling.   eat more fruits and vegetables, decrease soda or juice intake, increase water intake and increase physical activity    Alcohol use:  reports that she does not drink alcohol.  Nicotine status  reports that she has been smoking cigarettes. She has a 10.00 pack-year smoking history. She has never used smokeless tobacco.    Goals     • Fasting Blood Glucose       Barriers: compliance with diet      • Quit smoking / using tobacco           RISK SCORE: 4          This document has been electronically signed by Nirmal Calvillo MD on September 2, 2020 19:34

## 2020-09-04 DIAGNOSIS — IMO0002 UNCONTROLLED TYPE 2 DIABETES MELLITUS WITH DIABETIC POLYNEUROPATHY, WITH LONG-TERM CURRENT USE OF INSULIN: ICD-10-CM

## 2020-09-04 RX ORDER — GABAPENTIN 800 MG/1
800 TABLET ORAL 3 TIMES DAILY
Qty: 90 TABLET | Refills: 2 | Status: SHIPPED | OUTPATIENT
Start: 2020-09-04 | End: 2020-09-11 | Stop reason: SDUPTHER

## 2020-09-05 LAB — GABAPENTIN UR-MCNC: >800 UG/ML

## 2020-09-06 LAB
BH CV LOWER ARTERIAL LEFT ABI RATIO: 0.61
BH CV LOWER ARTERIAL LEFT DORSALIS PEDIS SYS MAX: 84 MMHG
BH CV LOWER ARTERIAL LEFT POST TIBIAL SYS MAX: 86 MMHG
BH CV LOWER ARTERIAL RIGHT ABI RATIO: 0.66
BH CV LOWER ARTERIAL RIGHT DORSALIS PEDIS SYS MAX: 92 MMHG
BH CV LOWER ARTERIAL RIGHT POST TIBIAL SYS MAX: 88 MMHG
BH CV LOWER VASCULAR LEFT COMMON FEMORAL AUGMENT: NORMAL
BH CV LOWER VASCULAR LEFT COMMON FEMORAL COMPETENT: NORMAL
BH CV LOWER VASCULAR LEFT COMMON FEMORAL COMPRESS: NORMAL
BH CV LOWER VASCULAR LEFT COMMON FEMORAL PHASIC: NORMAL
BH CV LOWER VASCULAR LEFT COMMON FEMORAL SPONT: NORMAL
BH CV LOWER VASCULAR LEFT DISTAL FEMORAL AUGMENT: NORMAL
BH CV LOWER VASCULAR LEFT DISTAL FEMORAL COMPETENT: NORMAL
BH CV LOWER VASCULAR LEFT DISTAL FEMORAL COMPRESS: NORMAL
BH CV LOWER VASCULAR LEFT DISTAL FEMORAL PHASIC: NORMAL
BH CV LOWER VASCULAR LEFT DISTAL FEMORAL SPONT: NORMAL
BH CV LOWER VASCULAR LEFT GASTRONEMIUS COMPRESS: NORMAL
BH CV LOWER VASCULAR LEFT GREATER SAPH AK AUGMENT: NORMAL
BH CV LOWER VASCULAR LEFT GREATER SAPH AK COMPETENT: NORMAL
BH CV LOWER VASCULAR LEFT GREATER SAPH AK COMPRESS: NORMAL
BH CV LOWER VASCULAR LEFT GREATER SAPH AK PHASIC: NORMAL
BH CV LOWER VASCULAR LEFT GREATER SAPH AK SPONT: NORMAL
BH CV LOWER VASCULAR LEFT LESSER SAPH AUGMENT: NORMAL
BH CV LOWER VASCULAR LEFT LESSER SAPH COMPETENT: NORMAL
BH CV LOWER VASCULAR LEFT LESSER SAPH COMPRESS: NORMAL
BH CV LOWER VASCULAR LEFT MID FEMORAL AUGMENT: NORMAL
BH CV LOWER VASCULAR LEFT MID FEMORAL COMPETENT: NORMAL
BH CV LOWER VASCULAR LEFT MID FEMORAL COMPRESS: NORMAL
BH CV LOWER VASCULAR LEFT MID FEMORAL PHASIC: NORMAL
BH CV LOWER VASCULAR LEFT MID FEMORAL SPONT: NORMAL
BH CV LOWER VASCULAR LEFT PERONEAL AUGMENT: NORMAL
BH CV LOWER VASCULAR LEFT PERONEAL COMPETENT: NORMAL
BH CV LOWER VASCULAR LEFT POPLITEAL AUGMENT: NORMAL
BH CV LOWER VASCULAR LEFT POPLITEAL COMPETENT: NORMAL
BH CV LOWER VASCULAR LEFT POPLITEAL COMPRESS: NORMAL
BH CV LOWER VASCULAR LEFT POPLITEAL PHASIC: NORMAL
BH CV LOWER VASCULAR LEFT POPLITEAL SPONT: NORMAL
BH CV LOWER VASCULAR LEFT POSTERIOR TIBIAL AUGMENT: NORMAL
BH CV LOWER VASCULAR LEFT POSTERIOR TIBIAL COMPETENT: NORMAL
BH CV LOWER VASCULAR LEFT POSTERIOR TIBIAL COMPRESS: NORMAL
BH CV LOWER VASCULAR LEFT PROFUNDA FEMORAL AUGMENT: NORMAL
BH CV LOWER VASCULAR LEFT PROFUNDA FEMORAL COMPETENT: NORMAL
BH CV LOWER VASCULAR LEFT PROFUNDA FEMORAL COMPRESS: NORMAL
BH CV LOWER VASCULAR LEFT PROFUNDA FEMORAL PHASIC: NORMAL
BH CV LOWER VASCULAR LEFT PROFUNDA FEMORAL SPONT: NORMAL
BH CV LOWER VASCULAR LEFT PROXIMAL FEMORAL AUGMENT: NORMAL
BH CV LOWER VASCULAR LEFT PROXIMAL FEMORAL COMPETENT: NORMAL
BH CV LOWER VASCULAR LEFT PROXIMAL FEMORAL COMPRESS: NORMAL
BH CV LOWER VASCULAR LEFT PROXIMAL FEMORAL PHASIC: NORMAL
BH CV LOWER VASCULAR LEFT PROXIMAL FEMORAL SPONT: NORMAL
BH CV LOWER VASCULAR LEFT SAPHENOFEMORAL JUNCTION AUGMENT: NORMAL
BH CV LOWER VASCULAR LEFT SAPHENOFEMORAL JUNCTION COMPETENT: NORMAL
BH CV LOWER VASCULAR LEFT SAPHENOFEMORAL JUNCTION COMPRESS: NORMAL
BH CV LOWER VASCULAR LEFT SAPHENOFEMORAL JUNCTION PHASIC: NORMAL
BH CV LOWER VASCULAR LEFT SAPHENOFEMORAL JUNCTION SPONT: NORMAL
BH CV LOWER VASCULAR RIGHT COMMON FEMORAL AUGMENT: NORMAL
BH CV LOWER VASCULAR RIGHT COMMON FEMORAL COMPETENT: NORMAL
BH CV LOWER VASCULAR RIGHT COMMON FEMORAL COMPRESS: NORMAL
BH CV LOWER VASCULAR RIGHT COMMON FEMORAL PHASIC: NORMAL
BH CV LOWER VASCULAR RIGHT COMMON FEMORAL SPONT: NORMAL
BH CV LOWER VASCULAR RIGHT DISTAL FEMORAL AUGMENT: NORMAL
BH CV LOWER VASCULAR RIGHT DISTAL FEMORAL COMPETENT: NORMAL
BH CV LOWER VASCULAR RIGHT DISTAL FEMORAL COMPRESS: NORMAL
BH CV LOWER VASCULAR RIGHT DISTAL FEMORAL PHASIC: NORMAL
BH CV LOWER VASCULAR RIGHT DISTAL FEMORAL SPONT: NORMAL
BH CV LOWER VASCULAR RIGHT GASTRONEMIUS AUGMENT: NORMAL
BH CV LOWER VASCULAR RIGHT GASTRONEMIUS COMPETENT: NORMAL
BH CV LOWER VASCULAR RIGHT GASTRONEMIUS COMPRESS: NORMAL
BH CV LOWER VASCULAR RIGHT GREATER SAPH AK AUGMENT: NORMAL
BH CV LOWER VASCULAR RIGHT GREATER SAPH AK COMPETENT: NORMAL
BH CV LOWER VASCULAR RIGHT GREATER SAPH AK COMPRESS: NORMAL
BH CV LOWER VASCULAR RIGHT GREATER SAPH AK PHASIC: NORMAL
BH CV LOWER VASCULAR RIGHT GREATER SAPH AK SPONT: NORMAL
BH CV LOWER VASCULAR RIGHT GREATER SAPH BK AUGMENT: NORMAL
BH CV LOWER VASCULAR RIGHT GREATER SAPH BK COMPETENT: NORMAL
BH CV LOWER VASCULAR RIGHT GREATER SAPH BK COMPRESS: NORMAL
BH CV LOWER VASCULAR RIGHT LESSER SAPH AUGMENT: NORMAL
BH CV LOWER VASCULAR RIGHT LESSER SAPH COMPETENT: NORMAL
BH CV LOWER VASCULAR RIGHT LESSER SAPH COMPRESS: NORMAL
BH CV LOWER VASCULAR RIGHT MID FEMORAL AUGMENT: NORMAL
BH CV LOWER VASCULAR RIGHT MID FEMORAL COMPETENT: NORMAL
BH CV LOWER VASCULAR RIGHT MID FEMORAL COMPRESS: NORMAL
BH CV LOWER VASCULAR RIGHT MID FEMORAL PHASIC: NORMAL
BH CV LOWER VASCULAR RIGHT MID FEMORAL SPONT: NORMAL
BH CV LOWER VASCULAR RIGHT PERONEAL COMPRESS: NORMAL
BH CV LOWER VASCULAR RIGHT POPLITEAL AUGMENT: NORMAL
BH CV LOWER VASCULAR RIGHT POPLITEAL COMPETENT: NORMAL
BH CV LOWER VASCULAR RIGHT POPLITEAL COMPRESS: NORMAL
BH CV LOWER VASCULAR RIGHT POPLITEAL PHASIC: NORMAL
BH CV LOWER VASCULAR RIGHT POPLITEAL SPONT: NORMAL
BH CV LOWER VASCULAR RIGHT POSTERIOR TIBIAL AUGMENT: NORMAL
BH CV LOWER VASCULAR RIGHT POSTERIOR TIBIAL COMPETENT: NORMAL
BH CV LOWER VASCULAR RIGHT POSTERIOR TIBIAL COMPRESS: NORMAL
BH CV LOWER VASCULAR RIGHT PROFUNDA FEMORAL AUGMENT: NORMAL
BH CV LOWER VASCULAR RIGHT PROFUNDA FEMORAL COMPETENT: NORMAL
BH CV LOWER VASCULAR RIGHT PROFUNDA FEMORAL COMPRESS: NORMAL
BH CV LOWER VASCULAR RIGHT PROFUNDA FEMORAL PHASIC: NORMAL
BH CV LOWER VASCULAR RIGHT PROFUNDA FEMORAL SPONT: NORMAL
BH CV LOWER VASCULAR RIGHT PROXIMAL FEMORAL AUGMENT: NORMAL
BH CV LOWER VASCULAR RIGHT PROXIMAL FEMORAL COMPETENT: NORMAL
BH CV LOWER VASCULAR RIGHT PROXIMAL FEMORAL COMPRESS: NORMAL
BH CV LOWER VASCULAR RIGHT PROXIMAL FEMORAL PHASIC: NORMAL
BH CV LOWER VASCULAR RIGHT PROXIMAL FEMORAL SPONT: NORMAL
BH CV LOWER VASCULAR RIGHT SAPHENOFEMORAL JUNCTION AUGMENT: NORMAL
BH CV LOWER VASCULAR RIGHT SAPHENOFEMORAL JUNCTION COMPETENT: NORMAL
BH CV LOWER VASCULAR RIGHT SAPHENOFEMORAL JUNCTION COMPRESS: NORMAL
BH CV LOWER VASCULAR RIGHT SAPHENOFEMORAL JUNCTION PHASIC: NORMAL
BH CV LOWER VASCULAR RIGHT SAPHENOFEMORAL JUNCTION SPONT: NORMAL
BH CV XLRA MEAS CAROTID RIGHT ICA/CCA DIASTOLIC RATIO: 1.4
BH CV XLRA MEAS LEFT DIST CCA EDV: 28.2 CM/SEC
BH CV XLRA MEAS LEFT DIST CCA PSV: 77.6 CM/SEC
BH CV XLRA MEAS LEFT DIST ICA EDV: 29 CM/SEC
BH CV XLRA MEAS LEFT DIST ICA PSV: 119 CM/SEC
BH CV XLRA MEAS LEFT ICA/CCA DIASTOLIC RATIO: 1.5
BH CV XLRA MEAS LEFT ICA/CCA RATIO: 1.7
BH CV XLRA MEAS LEFT MID ICA EDV: 41.5 CM/SEC
BH CV XLRA MEAS LEFT MID ICA PSV: 135 CM/SEC
BH CV XLRA MEAS LEFT PROX CCA EDV: 29.8 CM/SEC
BH CV XLRA MEAS LEFT PROX CCA PSV: 122 CM/SEC
BH CV XLRA MEAS LEFT PROX ECA EDV: 14.9 CM/SEC
BH CV XLRA MEAS LEFT PROX ECA PSV: 159 CM/SEC
BH CV XLRA MEAS LEFT PROX ICA EDV: 38.4 CM/SEC
BH CV XLRA MEAS LEFT PROX ICA PSV: 125 CM/SEC
BH CV XLRA MEAS LEFT VERTEBRAL A EDV: 27.4 CM/SEC
BH CV XLRA MEAS LEFT VERTEBRAL A PSV: 107 CM/SEC
BH CV XLRA MEAS RIGHT DIST CCA EDV: 24.3 CM/SEC
BH CV XLRA MEAS RIGHT DIST CCA PSV: 118 CM/SEC
BH CV XLRA MEAS RIGHT DIST ICA EDV: 33.7 CM/SEC
BH CV XLRA MEAS RIGHT DIST ICA PSV: 103 CM/SEC
BH CV XLRA MEAS RIGHT ICA/CCA RATIO: 0.9
BH CV XLRA MEAS RIGHT MID ICA EDV: 29 CM/SEC
BH CV XLRA MEAS RIGHT MID ICA PSV: 111 CM/SEC
BH CV XLRA MEAS RIGHT PROX CCA EDV: 25.1 CM/SEC
BH CV XLRA MEAS RIGHT PROX CCA PSV: 151 CM/SEC
BH CV XLRA MEAS RIGHT PROX ECA EDV: 9.4 CM/SEC
BH CV XLRA MEAS RIGHT PROX ECA PSV: 146 CM/SEC
BH CV XLRA MEAS RIGHT PROX ICA EDV: 20.4 CM/SEC
BH CV XLRA MEAS RIGHT PROX ICA PSV: 79.2 CM/SEC
BH CV XLRA MEAS RIGHT VERTEBRAL A EDV: 12.5 CM/SEC
BH CV XLRA MEAS RIGHT VERTEBRAL A PSV: 64.3 CM/SEC
BH CVPROX LEFT ICA HIDDEN LRR: 1 CM
UPPER ARTERIAL LEFT ARM BRACHIAL SYS MAX: 140 MMHG
UPPER ARTERIAL RIGHT ARM BRACHIAL SYS MAX: 132 MMHG

## 2020-09-10 ENCOUNTER — DOCUMENTATION (OUTPATIENT)
Dept: CARDIOLOGY | Facility: CLINIC | Age: 60
End: 2020-09-10

## 2020-09-10 NOTE — PROGRESS NOTES
Eugenio Barragan MD PhD  Hilda Landaverde RN             Doing know about what the status is about her seeing nephrology and having a LHC scheduled?  Also, please let her know that her lower extremity venous duplex showed no evidence of DVT.  Her ABIs were consistent with PAD (she has referral to vascular). Also, her carotid duplex shows that her carotid stent is patent.        Tried to call patient tp discuss above

## 2020-09-11 DIAGNOSIS — IMO0002 UNCONTROLLED TYPE 2 DIABETES MELLITUS WITH DIABETIC POLYNEUROPATHY, WITH LONG-TERM CURRENT USE OF INSULIN: ICD-10-CM

## 2020-09-11 RX ORDER — GABAPENTIN 800 MG/1
800 TABLET ORAL 3 TIMES DAILY
Qty: 90 TABLET | Refills: 0 | Status: SHIPPED | OUTPATIENT
Start: 2020-09-11 | End: 2020-10-09 | Stop reason: SDUPTHER

## 2020-09-11 NOTE — TELEPHONE ENCOUNTER
PATIENT CALLED ASKING ABOUT HER GABAPENTIN REFILL. PATIENT STATED THAT THE PHARMACY HAS NOT RECEIVED THE SCRIPT.   CHART STATED SCRIPT WAS PRINTED. PATIENT WAS INFORMED SCRIPT WOULD BE E-SCRIBED.    CALL BACK NUMBER FOR HER [phone].      THANKS,  TASHI

## 2020-09-14 ENCOUNTER — DOCUMENTATION (OUTPATIENT)
Dept: CARDIOLOGY | Facility: CLINIC | Age: 60
End: 2020-09-14

## 2020-09-14 NOTE — PROGRESS NOTES
Eugenio Barragan MD PhD  Hilda Landaverde, RN             Yeah, let's get  back into town and then we can decide.    Previous Messages    ----- Message -----   From: Hilda Landaverde RN   Sent: 9/14/2020   3:20 PM CDT   To: Eugenio Barragan MD PhD     She saw lolis on sept 10th. I got his note creatinine there was down to 1.4 from the 1.9. he did mention that we postponed the LHC in his note but did not mention a plan for scheduling. Do you want to wait on  to get back before making a plan. She has a follow up with lolis in 6  weeks . Ill lay a copy of irena note on ur desk thanks just let me know   ----- Message -----   From: Eugenio Barragan MD PhD   Sent: 9/8/2020   7:23 AM CDT   To: Hilda Landaverde RN     Doing know about what the status is about her seeing nephrology and having a LHC scheduled?  Also, please let her know that her lower extremity venous duplex showed no evidence of DVT.  Her ABIs were consistent with PAD (she has referral to vascular). Also, her carotid duplex shows that her carotid stent is patent.               Patient notified and the above was discussed. She mention to me that she won't be able to do her 6 minute walk because she is unable to walk that far. I let her know that she has an echo scheduled that same day and that she needs to come for that and we can re-assess her at that time for 6 minute walk.  Patient verbalized understanding

## 2020-09-27 NOTE — PROGRESS NOTES
8/17/2020    Yamileth Slater  1960    Chief Complaint:    Chief Complaint   Patient presents with   • Peripheral Vascular Disease       HPI:      PCP:  Nirmal Calvillo MD  Cardiology:  Dr Barragan       60 y.o. female with HTN(uncontrolled, increased risk stroke, rupture), Hyperlipidemia(stable, increased risk cardiovascular events), Diabetes Mellitus(stable, increased risk cardiovascular events), Obesity(uncontrolled, increased risk cardiovascular events), Smoker(uncontrolled, increased risk cardiovascular events) and COPD(stable, increased risk pulmonary complications) , carotid stenosis(new, increase risk stroke), PVD(new, increase risk cardiovascular events).  smokes 1/2 PPD.  Moderate pain legs, back with walking 100yds x years.  Better after rest.  Prior peripheral intervention..  No TIA stroke amaurosis.  No MI claudication. No other associated signs, symptoms or modifying factors.    12/2014 RIGHT SFA stent ([id number] lifestent, [id number] ultraverse)  8/2020  SUNDEEP:  RIGHT .66 tri/biphasic.  LEFT .61 tri/biphasic.    5/2014 LEFT Carotid stent ([id number], [id number] xact, transfemoral)  8/2020 Carotid Duplex:  DWIGHT 0-49% (111/34cm/s, ratio 0.9)  LICA 50-69% (135/42cm/s, ratio 1.7)  Antegrade verts.    8/2020 Lower extremity venous duplex:  RIGHT no dvt, no reflux.  LEFT no dvt, no reflux.  GSV absent up to mid thigh.    2013 CABG  4/2017 Echocardiogram:  EF 55%, LA 36mm, LV 47mm.    6/2020 ECG:  NSR 88, QTc 469  7/2020 RHC:  Exercise induced PAH.  Normal R L filling pressures.  PAP 30/16    The following portions of the patient's history were reviewed and updated as appropriate: allergies, current medications, past family history, past medical history, past social history, past surgical history and problem list.  Recent images independently reviewed.  Available laboratory values reviewed.    PMH:  Past Medical History:   Diagnosis Date   • Anxiety states    • Asthma    • Carotid artery stenosis      DWIGHT 0-15%, LICA 0-15%. LICA stent 5/2014.   • Chronic folliculitis    • Chronic obstructive lung disease (CMS/HCC)    • Coronary arteriosclerosis    • Diabetes mellitus (CMS/[facility])     no retinopathy; A1C 9.1      • Dyslipidemia    • Essential hypertension    • Excoriated eczema     asteosis/keratosis   • GERD (gastroesophageal reflux disease)    • History of colon polyps    • Hypertensive disorder    • Kidney stone    • Mixed hyperlipidemia    • Morbid obesity due to excess calories (CMS/[facility])     BMI 44.5   • Neurologic disorder associated with diabetes mellitus (CMS/[facility])    • Non-healing surgical wound     LLE EVHa   • Peripheral vascular disease (CMS/[facility])    • Sleep apnea    • Tobacco dependence syndrome     1/2ppd      • Type 2 diabetes mellitus (CMS/[facility])    • Vitamin D deficiency      Past Surgical History:   Procedure Laterality Date   • CARDIAC CATHETERIZATION  08/09/2013    Severe multivessel CAD with critical lesions noted in the RCA, obtuse marginal two, ramus intermemdious, and LAD as described above. Normal LV systolic function with no wall motion abnormality.    • CARDIAC CATHETERIZATION  05/27/2014     LEFT Carotid Stent    • CARDIAC CATHETERIZATION N/A 7/14/2020    Procedure: Right Heart Cath;  Surgeon: Eugenio Barragan MD PhD;  Location: [facility] CATH INVASIVE LOC[facility];  Service: Cardiology;  Laterality: N/A;   • CAROTID STENT Left 05/27/2014   • COLONOSCOPY  05/02/2016    Normal colon.Diverticulosis in the sigmoid colon.No specimens collected.    • CORONARY ARTERY BYPASS GRAFT  11/15/2013    CABG x 3 with LIMA to LAD and coronary endarterectomy to LAD, SVG to Ramus intermedius branch and SVG to PDA.    • CYSTOSCOPY URETEROSCOPY LASER LITHOTRIPSY     • ENDOSCOPY  09/23/2015     Esophageal stricture present.Normal stomach.Normal duodenum.    • ENDOSCOPY  11/16/2015    w/ dilatation - Esophageal stricture present,Dilatation performed.Normal stomach and duodenum.    • HYSTERECTOMY     • INCISION AND  DRAINAGE ABSCESS  01/02/2016    Incision and drainage of right perineal abscess.    • INCISION AND DRAINAGE ABSCESS  02/18/2014    Incision and Drainage of abscess of left lower leg    • OTHER SURGICAL HISTORY  01/25/2016    DESTRUCTION OF BENIGN LESION      • OTHER SURGICAL HISTORY  04/18/2014    ear/neck - L attempted CEA, Neck Dissection    • TUBAL ABDOMINAL LIGATION       Family History   Problem Relation Age of Onset   • Heart disease Mother    • Cancer Mother    • Heart disease Father    • Heart disease Sister    • Cancer Sister    • Heart disease Brother      Social History     Tobacco Use   • Smoking status: Current Every Day Smoker     Packs/day: 0.25     Years: 40.00     Pack years: 10.00     Types: Cigarettes   • Smokeless tobacco: Never Used   Substance Use Topics   • Alcohol use: No   • Drug use: Yes     Types: LSD, Marijuana, Methamphetamines     Comment: Lasted used in 2006       ALLERGIES:  Allergies   Allergen Reactions   • Adhesive Tape Hives   • Other      Bandaids, MRSA, SURECLOSE         MEDICATIONS:    Current Outpatient Medications:   •  betamethasone valerate (VALISONE) 0.1 % cream, Apply  topically to the appropriate area as directed Daily., Disp: 45 g, Rfl: 2  •  Blood Glucose Monitoring Suppl ([employer] BLOOD GLUCOSE METER) w/Device kit, 1 each 3 (Three) Times a Day., Disp: 1 kit, Rfl: 3  •  Insulin Pen Needle (NovoFine) 30G X 8 MM misc, As directed 4 times daily, Disp: 100 each, Rfl: 11  •  ipratropium-albuterol (DUO-NEB) 0.5-2.5 mg/3 ml nebulizer, Take 3 mL by nebulization 4 (Four) Times a Day. ICD10 code J96.01, Disp: 360 mL, Rfl: 0  •  lidocaine (LIDODERM) 5 %, Place 1 patch on the skin as directed by provider Daily. Remove & Discard patch within 12 hours or as directed by MD, Disp: 15 each, Rfl: 0  •  lidocaine (XYLOCAINE) 5 % ointment, Apply  topically to the appropriate area as directed Every 2 (Two) Hours As Needed for Mild Pain ., Disp: 1 tube, Rfl: 0  •  Liraglutide (Victoza) 18 MG/3ML  solution pen-injector injection, Inject 1.2 mg under the skin into the appropriate area as directed Daily., Disp: 3 mL, Rfl: 1  •  nicotine (Nicoderm CQ) 21 MG/24HR patch, Place 1 patch on the skin as directed by provider Daily., Disp: 42 patch, Rfl: 0  •  nitroglycerin (NITROSTAT) 0.4 MG SL tablet, Place 1 tablet under the tongue As Needed for Chest Pain. Take no more than 3 doses in 15 minutes., Disp: 30 tablet, Rfl: 3  •  nystatin (MYCOSTATIN) 731005 UNIT/GM powder, Apply  topically to the appropriate area as directed 3 (Three) Times a Day., Disp: 15 g, Rfl: 1  •  ondansetron ODT (Zofran ODT) 4 MG disintegrating tablet, Place 1 tablet on the tongue Every 12 (Twelve) Hours As Needed for Nausea or Vomiting., Disp: 30 tablet, Rfl: 0  •  promethazine (PHENERGAN) 25 MG tablet, Take 1 tablet by mouth Every 6 (Six) Hours As Needed for Nausea or Vomiting., Disp: 30 tablet, Rfl: 0  •  albuterol (ACCUNEB) 0.63 MG/3ML nebulizer solution, Take 3 mL by nebulization Every 6 (Six) Hours As Needed for Wheezing., Disp: 20 vial, Rfl: 0  •  albuterol sulfate  (90 Base) MCG/ACT inhaler, Inhale 2 puffs Every 4 (Four) Hours As Needed for Wheezing., Disp: 18 g, Rfl: 1  •  aspirin (aspirin) 81 MG EC tablet, Take 1 tablet by mouth Daily., Disp: 31 tablet, Rfl: 6  •  atorvastatin (LIPITOR) 40 MG tablet, Take 1 tablet by mouth Daily., Disp: 90 tablet, Rfl: 0  •  cetirizine (zyrTEC) 10 MG tablet, Take 1 tablet by mouth Daily., Disp: 30 tablet, Rfl: 3  •  clopidogrel (PLAVIX) 75 MG tablet, Take 1 tablet by mouth Daily., Disp: 30 tablet, Rfl: 5  •  cyclobenzaprine (FLEXERIL) 10 MG tablet, Take 1 tablet by mouth 2 (Two) Times a Day As Needed for Muscle Spasms., Disp: 60 tablet, Rfl: 5  •  EASY TOUCH PEN NEEDLES 31G X 8 MM misc, , Disp: , Rfl:   •  Empagliflozin (Jardiance) 10 MG tablet, Take 10 mg by mouth Every Morning., Disp: 90 tablet, Rfl: 3  •  ferrous sulfate (FeroSul) 325 (65 FE) MG tablet, Take 1 tablet by mouth Daily With  Breakfast., Disp: 30 tablet, Rfl: 3  •  furosemide (LASIX) 40 MG tablet, Take 1 tablet by mouth Daily., Disp: 30 tablet, Rfl: 3  •  gabapentin (NEURONTIN) 800 MG tablet, Take 1 tablet by mouth 3 (Three) Times a Day. For neuropathy, Disp: 90 tablet, Rfl: 0  •  glucose blood test strip, Use as instructed, Disp: 100 each, Rfl: 12  •  Insulin Glargine (Lantus SoloStar) 100 UNIT/ML injection pen, Inject 95 Units under the skin into the appropriate area as directed Every 12 (Twelve) Hours., Disp: 60 mL, Rfl: 11  •  lisinopril (PRINIVIL,ZESTRIL) 10 MG tablet, Take 1 tablet by mouth Daily., Disp: 30 tablet, Rfl: 5  •  metFORMIN (GLUCOPHAGE) 1000 MG tablet, Take 1 tablet by mouth 2 (Two) Times a Day With Meals., Disp: 60 tablet, Rfl: 5  •  metoprolol tartrate (LOPRESSOR) 50 MG tablet, Take 1 tablet by mouth Every 12 (Twelve) Hours., Disp: 60 tablet, Rfl: 5  •  montelukast (SINGULAIR) 10 MG tablet, Take 1 tablet by mouth Every Night., Disp: 30 tablet, Rfl: 5  •  naproxen (NAPROSYN) 500 MG tablet, Take 1 tablet by mouth 2 (Two) Times a Day With Meals., Disp: 60 tablet, Rfl: 3  •  pantoprazole (PROTONIX) 40 MG EC tablet, Take 1 tablet by mouth Daily., Disp: 30 tablet, Rfl: 6  •  potassium chloride (MICRO-K) 10 MEQ CR capsule, Take 1 capsule by mouth Daily., Disp: 30 capsule, Rfl: 5    Review of Systems   Review of Systems   Constitution: Negative for malaise/fatigue, night sweats and weight loss.   HENT: Negative for hearing loss, hoarse voice and stridor.    Eyes: Negative for vision loss in left eye, vision loss in right eye and visual disturbance.   Cardiovascular: Positive for chest pain, claudication, dyspnea on exertion and leg swelling. Negative for palpitations.   Respiratory: Positive for cough, shortness of breath and sleep disturbances due to breathing. Negative for hemoptysis.    Hematologic/Lymphatic: Negative for adenopathy and bleeding problem. Does not bruise/bleed easily.   Skin: Negative for color change, poor  "wound healing and rash.   Musculoskeletal: Negative for arthritis, back pain, muscle weakness and neck pain.   Gastrointestinal: Negative for abdominal pain, dysphagia and heartburn.   Neurological: Negative for dizziness, numbness and seizures.   Psychiatric/Behavioral: Negative for altered mental status, depression and memory loss. The patient is not nervous/anxious.        Physical Exam   Vitals:    08/17/20 0958   BP: 159/92   BP Location: Left arm   Patient Position: Sitting   Pulse: 97   SpO2: 97%   Weight: 129 kg (283 lb 9.6 oz)   Height: 157.5 cm (62\")     Physical Exam  Constitutional:       General: She is not in acute distress.     Appearance: She is not ill-appearing.   HENT:      Head: Atraumatic.      Right Ear: Hearing normal.      Left Ear: Hearing normal.      Nose: No nasal deformity.      Mouth/Throat:      Dentition: Normal dentition. Does not have dentures.   Eyes:      General: Lids are normal.      Conjunctiva/sclera: Conjunctivae normal.      Pupils:      Right eye: Pupil is round and reactive.      Left eye: Pupil is round and reactive.   Neck:      Thyroid: No thyroid mass or thyromegaly.      Vascular: No carotid bruit or JVD.      Trachea: No tracheal deviation.   Cardiovascular:      Rate and Rhythm: Normal rate and regular rhythm.      Pulses:           Carotid pulses are 2+ on the right side and 2+ on the left side with bruit.       Radial pulses are 2+ on the right side and 2+ on the left side.        Dorsalis pedis pulses are 1+ on the right side and 1+ on the left side.        Posterior tibial pulses are 1+ on the right side and 1+ on the left side.      Heart sounds: No murmur.   Pulmonary:      Effort: Pulmonary effort is normal.      Breath sounds: Normal breath sounds.   Abdominal:      General: There is no distension.      Palpations: Abdomen is soft. There is no hepatomegaly, splenomegaly or mass.      Tenderness: There is no abdominal tenderness.   Musculoskeletal:         " General: No deformity.      Comments: Gait normal.    Lymphadenopathy:      Cervical: No cervical adenopathy.      Upper Body:      Right upper body: No supraclavicular adenopathy.      Left upper body: No supraclavicular adenopathy.   Skin:     General: Skin is warm and dry.      Coloration: Skin is not pale.      Findings: No erythema.      Comments: No venous staining   Neurological:      Mental Status: She is alert and oriented to person, place, and time.   Psychiatric:         Speech: Speech normal.         Behavior: Behavior is cooperative.         Thought Content: Thought content normal.         Judgment: Judgment normal.         BUN   Date Value Ref Range Status   09/02/2020 33 (H) 8 - 23 mg/dL Final     Creatinine   Date Value Ref Range Status   09/02/2020 1.49 (H) 0.57 - 1.00 mg/dL Final     eGFR Non  Amer   Date Value Ref Range Status   09/02/2020 36 (L) >60 mL/min/1.73 Final       ASSESSMENT:  Yamileth was seen today for peripheral vascular disease.    Diagnoses and all orders for this visit:    Bilateral carotid artery stenosis  -     Duplex Carotid Ultrasound CAR; Future    Coronary artery disease involving native coronary artery of native heart with unstable angina pectoris (CMS/HCC)    Essential hypertension    Mixed hyperlipidemia    Peripheral vascular disease (CMS/HCC)  -     Doppler Ankle Brachial Index Single Level CAR; Future    Pulmonary hypertension (CMS/HCC)    Venous insufficiency    Chronic bronchitis, unspecified chronic bronchitis type (CMS/HCC)    MIKE on CPAP    Morbid obesity with BMI of 50.0-59.9, adult (CMS/HCC)    DM type 2 with diabetic peripheral neuropathy (CMS/HCC)      PLAN:  Detailed discussion with Yamileth Nga Bryon regarding situation and options.  Stable PVD, Carotid Stenosis..  Multiple risk factors with severe comorbidities.  No intervention indicated at this time.  Will follow with interval imaging.  Risks, benefits discussed.  Understands and wishes to proceed  with plan.    Return in 6 months with SUNDEEP, 1 year with Carotid Duplex    Return after above studies complete  Obesity Class  3. Increased risk cardiovascular events, sleep and breathing disorders, joint issues, type 2 diabetes mellitus. Options for weight management, heart healthy diet, exercise programs, and associated health risks of obesity discussed.  Smoking cessation advised and assistance options offered including behavioral counseling ([facility]essation [facility]), Nicotine replacement therapy (patches or gum), pharmacologic therapy (Chantix, Wellbutrin). patient understands that continued use of tobacco products increases risk of heart disease, stroke, cancer; counseling for 3-5min.    Recommended regular physical activity, progressive walking program.  Continue current medications as directed.  Will Obtain relevant old records.    Thank you for the opportunity to participate in this patient's care.    Copy to primary care provider.    EMR Dragon/Transcription disclaimer:   Much of this encounter note is an electronic transcription/translation of spoken language to printed text. The electronic translation of spoken language may permit erroneous, or at times, nonsensical words or phrases to be inadvertently transcribed; Although I have reviewed the note for such errors, some may still exist.

## 2020-10-02 DIAGNOSIS — IMO0002 UNCONTROLLED TYPE 2 DIABETES MELLITUS WITH DIABETIC POLYNEUROPATHY, WITH LONG-TERM CURRENT USE OF INSULIN: ICD-10-CM

## 2020-10-02 RX ORDER — GABAPENTIN 800 MG/1
800 TABLET ORAL 3 TIMES DAILY
Qty: 90 TABLET | Refills: 0 | OUTPATIENT
Start: 2020-10-02

## 2020-10-02 NOTE — PROGRESS NOTES
Requesting refill for gabaentin too early. Mamadou reviewed and showed last refill performed on 9/11/20. Request # [id number]. Will wait to refill.           This document has been electronically signed by Nirmal Calvillo MD on October 2, 2020 13:15 CDT

## 2020-10-05 ENCOUNTER — APPOINTMENT (OUTPATIENT)
Dept: GENERAL RADIOLOGY | Facility: HOSPITAL | Age: 60
End: 2020-10-05

## 2020-10-05 ENCOUNTER — HOSPITAL ENCOUNTER (OUTPATIENT)
Facility: HOSPITAL | Age: 60
Setting detail: OBSERVATION
Discharge: HOME HEALTH | End: 2020-10-06
Attending: STUDENT IN AN ORGANIZED HEALTH CARE EDUCATION/TRAINING PROGRAM | Admitting: FAMILY MEDICINE

## 2020-10-05 DIAGNOSIS — R07.89 CHEST WALL PAIN: ICD-10-CM

## 2020-10-05 DIAGNOSIS — M54.2 NECK PAIN: ICD-10-CM

## 2020-10-05 DIAGNOSIS — R07.9 CHEST PAIN, UNSPECIFIED TYPE: Primary | ICD-10-CM

## 2020-10-05 PROBLEM — M25.552 LEFT HIP PAIN: Status: ACTIVE | Noted: 2020-10-05

## 2020-10-05 PROBLEM — N18.30 STAGE 3 CHRONIC KIDNEY DISEASE: Status: ACTIVE | Noted: 2020-10-05

## 2020-10-05 LAB
ANION GAP SERPL CALCULATED.3IONS-SCNC: 13 MMOL/L (ref 5–15)
BASOPHILS # BLD AUTO: 0.1 10*3/MM3 (ref 0–0.2)
BASOPHILS NFR BLD AUTO: 0.9 % (ref 0–1.5)
BUN SERPL-MCNC: 35 MG/DL (ref 8–23)
BUN/CREAT SERPL: 19.6 (ref 7–25)
CALCIUM SPEC-SCNC: 9 MG/DL (ref 8.6–10.5)
CHLORIDE SERPL-SCNC: 100 MMOL/L (ref 98–107)
CO2 SERPL-SCNC: 22 MMOL/L (ref 22–29)
CREAT SERPL-MCNC: 1.79 MG/DL (ref 0.57–1)
DEPRECATED RDW RBC AUTO: 48.3 FL (ref 37–54)
EOSINOPHIL # BLD AUTO: 0.53 10*3/MM3 (ref 0–0.4)
EOSINOPHIL NFR BLD AUTO: 4.5 % (ref 0.3–6.2)
ERYTHROCYTE [DISTWIDTH] IN BLOOD BY AUTOMATED COUNT: 15.5 % (ref 12.3–15.4)
GFR SERPL CREATININE-BSD FRML MDRD: 29 ML/MIN/1.73
GLUCOSE SERPL-MCNC: 265 MG/DL (ref 65–99)
HCT VFR BLD AUTO: 36.8 % (ref 34–46.6)
HGB BLD-MCNC: 11.6 G/DL (ref 12–15.9)
HOLD SPECIMEN: NORMAL
HOLD SPECIMEN: NORMAL
IMM GRANULOCYTES # BLD AUTO: 0.08 10*3/MM3 (ref 0–0.05)
IMM GRANULOCYTES NFR BLD AUTO: 0.7 % (ref 0–0.5)
LYMPHOCYTES # BLD AUTO: 2.84 10*3/MM3 (ref 0.7–3.1)
LYMPHOCYTES NFR BLD AUTO: 24.3 % (ref 19.6–45.3)
MCH RBC QN AUTO: 26.9 PG (ref 26.6–33)
MCHC RBC AUTO-ENTMCNC: 31.5 G/DL (ref 31.5–35.7)
MCV RBC AUTO: 85.4 FL (ref 79–97)
MONOCYTES # BLD AUTO: 0.75 10*3/MM3 (ref 0.1–0.9)
MONOCYTES NFR BLD AUTO: 6.4 % (ref 5–12)
NEUTROPHILS NFR BLD AUTO: 63.2 % (ref 42.7–76)
NEUTROPHILS NFR BLD AUTO: 7.4 10*3/MM3 (ref 1.7–7)
NRBC BLD AUTO-RTO: 0 /100 WBC (ref 0–0.2)
NT-PROBNP SERPL-MCNC: 246 PG/ML (ref 0–900)
PLATELET # BLD AUTO: 285 10*3/MM3 (ref 140–450)
PMV BLD AUTO: 9.2 FL (ref 6–12)
POTASSIUM SERPL-SCNC: 4.3 MMOL/L (ref 3.5–5.2)
RBC # BLD AUTO: 4.31 10*6/MM3 (ref 3.77–5.28)
SARS-COV-2 N GENE RESP QL NAA+PROBE: NOT DETECTED
SODIUM SERPL-SCNC: 135 MMOL/L (ref 136–145)
TROPONIN T SERPL-MCNC: <0.01 NG/ML (ref 0–0.03)
WBC # BLD AUTO: 11.7 10*3/MM3 (ref 3.4–10.8)
WHOLE BLOOD HOLD SPECIMEN: NORMAL
WHOLE BLOOD HOLD SPECIMEN: NORMAL

## 2020-10-05 PROCEDURE — 25010000002 MORPHINE PER 10 MG: Performed by: STUDENT IN AN ORGANIZED HEALTH CARE EDUCATION/TRAINING PROGRAM

## 2020-10-05 PROCEDURE — 96372 THER/PROPH/DIAG INJ SC/IM: CPT

## 2020-10-05 PROCEDURE — 87635 SARS-COV-2 COVID-19 AMP PRB: CPT | Performed by: STUDENT IN AN ORGANIZED HEALTH CARE EDUCATION/TRAINING PROGRAM

## 2020-10-05 PROCEDURE — 71046 X-RAY EXAM CHEST 2 VIEWS: CPT

## 2020-10-05 PROCEDURE — 73502 X-RAY EXAM HIP UNI 2-3 VIEWS: CPT

## 2020-10-05 PROCEDURE — 83880 ASSAY OF NATRIURETIC PEPTIDE: CPT | Performed by: STUDENT IN AN ORGANIZED HEALTH CARE EDUCATION/TRAINING PROGRAM

## 2020-10-05 PROCEDURE — G0378 HOSPITAL OBSERVATION PER HR: HCPCS

## 2020-10-05 PROCEDURE — 96374 THER/PROPH/DIAG INJ IV PUSH: CPT

## 2020-10-05 PROCEDURE — 25010000002 ORPHENADRINE CITRATE PER 60 MG: Performed by: STUDENT IN AN ORGANIZED HEALTH CARE EDUCATION/TRAINING PROGRAM

## 2020-10-05 PROCEDURE — 25010000002 ENOXAPARIN PER 10 MG: Performed by: STUDENT IN AN ORGANIZED HEALTH CARE EDUCATION/TRAINING PROGRAM

## 2020-10-05 PROCEDURE — 84484 ASSAY OF TROPONIN QUANT: CPT | Performed by: STUDENT IN AN ORGANIZED HEALTH CARE EDUCATION/TRAINING PROGRAM

## 2020-10-05 PROCEDURE — 63710000001 INSULIN DETEMIR PER 5 UNITS: Performed by: STUDENT IN AN ORGANIZED HEALTH CARE EDUCATION/TRAINING PROGRAM

## 2020-10-05 PROCEDURE — 93010 ELECTROCARDIOGRAM REPORT: CPT | Performed by: INTERNAL MEDICINE

## 2020-10-05 PROCEDURE — 80048 BASIC METABOLIC PNL TOTAL CA: CPT | Performed by: STUDENT IN AN ORGANIZED HEALTH CARE EDUCATION/TRAINING PROGRAM

## 2020-10-05 PROCEDURE — 93005 ELECTROCARDIOGRAM TRACING: CPT | Performed by: STUDENT IN AN ORGANIZED HEALTH CARE EDUCATION/TRAINING PROGRAM

## 2020-10-05 PROCEDURE — 96375 TX/PRO/DX INJ NEW DRUG ADDON: CPT

## 2020-10-05 PROCEDURE — 85025 COMPLETE CBC W/AUTO DIFF WBC: CPT | Performed by: STUDENT IN AN ORGANIZED HEALTH CARE EDUCATION/TRAINING PROGRAM

## 2020-10-05 PROCEDURE — C9803 HOPD COVID-19 SPEC COLLECT: HCPCS

## 2020-10-05 PROCEDURE — 99284 EMERGENCY DEPT VISIT MOD MDM: CPT

## 2020-10-05 PROCEDURE — 84484 ASSAY OF TROPONIN QUANT: CPT | Performed by: FAMILY MEDICINE

## 2020-10-05 PROCEDURE — 99219 PR INITIAL OBSERVATION CARE/DAY 50 MINUTES: CPT | Performed by: STUDENT IN AN ORGANIZED HEALTH CARE EDUCATION/TRAINING PROGRAM

## 2020-10-05 PROCEDURE — 93005 ELECTROCARDIOGRAM TRACING: CPT

## 2020-10-05 RX ORDER — FERROUS SULFATE TAB EC 324 MG (65 MG FE EQUIVALENT) 324 (65 FE) MG
324 TABLET DELAYED RESPONSE ORAL
Status: DISCONTINUED | OUTPATIENT
Start: 2020-10-06 | End: 2020-10-06 | Stop reason: HOSPADM

## 2020-10-05 RX ORDER — ALBUTEROL SULFATE 2.5 MG/3ML
2.5 SOLUTION RESPIRATORY (INHALATION) EVERY 4 HOURS PRN
Status: DISCONTINUED | OUTPATIENT
Start: 2020-10-05 | End: 2020-10-06 | Stop reason: HOSPADM

## 2020-10-05 RX ORDER — GABAPENTIN 400 MG/1
800 CAPSULE ORAL EVERY 8 HOURS SCHEDULED
Status: DISCONTINUED | OUTPATIENT
Start: 2020-10-05 | End: 2020-10-06 | Stop reason: HOSPADM

## 2020-10-05 RX ORDER — LIDOCAINE 50 MG/G
1 PATCH TOPICAL
Status: DISCONTINUED | OUTPATIENT
Start: 2020-10-05 | End: 2020-10-06 | Stop reason: HOSPADM

## 2020-10-05 RX ORDER — SODIUM CHLORIDE 9 MG/ML
50 INJECTION, SOLUTION INTRAVENOUS CONTINUOUS
Status: DISCONTINUED | OUTPATIENT
Start: 2020-10-05 | End: 2020-10-06 | Stop reason: HOSPADM

## 2020-10-05 RX ORDER — OXYBUTYNIN CHLORIDE 5 MG/1
5 TABLET, EXTENDED RELEASE ORAL DAILY
COMMUNITY
Start: 2020-09-10 | End: 2021-03-26 | Stop reason: SDUPTHER

## 2020-10-05 RX ORDER — FUROSEMIDE 40 MG/1
40 TABLET ORAL DAILY
Status: DISCONTINUED | OUTPATIENT
Start: 2020-10-05 | End: 2020-10-06

## 2020-10-05 RX ORDER — MORPHINE SULFATE 2 MG/ML
1 INJECTION, SOLUTION INTRAMUSCULAR; INTRAVENOUS EVERY 4 HOURS PRN
Status: DISCONTINUED | OUTPATIENT
Start: 2020-10-05 | End: 2020-10-06 | Stop reason: HOSPADM

## 2020-10-05 RX ORDER — ONDANSETRON 2 MG/ML
4 INJECTION INTRAMUSCULAR; INTRAVENOUS EVERY 6 HOURS PRN
Status: DISCONTINUED | OUTPATIENT
Start: 2020-10-05 | End: 2020-10-06 | Stop reason: HOSPADM

## 2020-10-05 RX ORDER — ATORVASTATIN CALCIUM 40 MG/1
40 TABLET, FILM COATED ORAL DAILY
Status: DISCONTINUED | OUTPATIENT
Start: 2020-10-06 | End: 2020-10-06 | Stop reason: HOSPADM

## 2020-10-05 RX ORDER — NICOTINE POLACRILEX 4 MG
15 LOZENGE BUCCAL
Status: DISCONTINUED | OUTPATIENT
Start: 2020-10-05 | End: 2020-10-06 | Stop reason: HOSPADM

## 2020-10-05 RX ORDER — DEXTROSE MONOHYDRATE 25 G/50ML
25 INJECTION, SOLUTION INTRAVENOUS
Status: DISCONTINUED | OUTPATIENT
Start: 2020-10-05 | End: 2020-10-06 | Stop reason: HOSPADM

## 2020-10-05 RX ORDER — ACETAMINOPHEN 650 MG/1
650 SUPPOSITORY RECTAL EVERY 4 HOURS PRN
Status: DISCONTINUED | OUTPATIENT
Start: 2020-10-05 | End: 2020-10-06 | Stop reason: HOSPADM

## 2020-10-05 RX ORDER — SODIUM BICARBONATE 325 MG/1
325 TABLET ORAL 2 TIMES DAILY
COMMUNITY
End: 2020-10-09 | Stop reason: SDUPTHER

## 2020-10-05 RX ORDER — NITROGLYCERIN 0.4 MG/1
0.4 TABLET SUBLINGUAL AS NEEDED
Status: DISCONTINUED | OUTPATIENT
Start: 2020-10-05 | End: 2020-10-06 | Stop reason: HOSPADM

## 2020-10-05 RX ORDER — CLOPIDOGREL BISULFATE 75 MG/1
75 TABLET ORAL DAILY
Status: DISCONTINUED | OUTPATIENT
Start: 2020-10-06 | End: 2020-10-06 | Stop reason: HOSPADM

## 2020-10-05 RX ORDER — CETIRIZINE HYDROCHLORIDE 10 MG/1
10 TABLET ORAL DAILY
Status: DISCONTINUED | OUTPATIENT
Start: 2020-10-06 | End: 2020-10-06 | Stop reason: HOSPADM

## 2020-10-05 RX ORDER — ORPHENADRINE CITRATE 30 MG/ML
60 INJECTION INTRAMUSCULAR; INTRAVENOUS ONCE
Status: COMPLETED | OUTPATIENT
Start: 2020-10-05 | End: 2020-10-05

## 2020-10-05 RX ORDER — SODIUM CHLORIDE 0.9 % (FLUSH) 0.9 %
10 SYRINGE (ML) INJECTION EVERY 12 HOURS SCHEDULED
Status: DISCONTINUED | OUTPATIENT
Start: 2020-10-05 | End: 2020-10-06 | Stop reason: HOSPADM

## 2020-10-05 RX ORDER — ONDANSETRON 4 MG/1
4 TABLET, FILM COATED ORAL EVERY 6 HOURS PRN
Status: DISCONTINUED | OUTPATIENT
Start: 2020-10-05 | End: 2020-10-06 | Stop reason: HOSPADM

## 2020-10-05 RX ORDER — SODIUM POLYSTYRENE SULFONATE 15 G/60ML
SUSPENSION ORAL; RECTAL
COMMUNITY
Start: 2020-09-03 | End: 2020-12-18

## 2020-10-05 RX ORDER — IPRATROPIUM BROMIDE AND ALBUTEROL SULFATE 2.5; .5 MG/3ML; MG/3ML
3 SOLUTION RESPIRATORY (INHALATION) 4 TIMES DAILY PRN
Status: DISCONTINUED | OUTPATIENT
Start: 2020-10-05 | End: 2020-10-06 | Stop reason: HOSPADM

## 2020-10-05 RX ORDER — LISINOPRIL 10 MG/1
10 TABLET ORAL DAILY
Status: DISCONTINUED | OUTPATIENT
Start: 2020-10-06 | End: 2020-10-06 | Stop reason: HOSPADM

## 2020-10-05 RX ORDER — ACETAMINOPHEN 160 MG/5ML
650 SOLUTION ORAL EVERY 4 HOURS PRN
Status: DISCONTINUED | OUTPATIENT
Start: 2020-10-05 | End: 2020-10-06 | Stop reason: HOSPADM

## 2020-10-05 RX ORDER — PANTOPRAZOLE SODIUM 40 MG/1
40 TABLET, DELAYED RELEASE ORAL DAILY
Status: DISCONTINUED | OUTPATIENT
Start: 2020-10-05 | End: 2020-10-06 | Stop reason: HOSPADM

## 2020-10-05 RX ORDER — NALOXONE HCL 0.4 MG/ML
0.4 VIAL (ML) INJECTION
Status: DISCONTINUED | OUTPATIENT
Start: 2020-10-05 | End: 2020-10-06 | Stop reason: HOSPADM

## 2020-10-05 RX ORDER — SODIUM CHLORIDE 0.9 % (FLUSH) 0.9 %
10 SYRINGE (ML) INJECTION AS NEEDED
Status: DISCONTINUED | OUTPATIENT
Start: 2020-10-05 | End: 2020-10-06 | Stop reason: HOSPADM

## 2020-10-05 RX ORDER — ATORVASTATIN CALCIUM 20 MG/1
TABLET, FILM COATED ORAL
Status: ON HOLD | COMMUNITY
Start: 2020-09-25 | End: 2020-10-05

## 2020-10-05 RX ORDER — ACETAMINOPHEN 325 MG/1
650 TABLET ORAL EVERY 4 HOURS PRN
Status: DISCONTINUED | OUTPATIENT
Start: 2020-10-05 | End: 2020-10-06 | Stop reason: HOSPADM

## 2020-10-05 RX ORDER — CYCLOBENZAPRINE HCL 10 MG
10 TABLET ORAL 2 TIMES DAILY PRN
Status: DISCONTINUED | OUTPATIENT
Start: 2020-10-05 | End: 2020-10-06 | Stop reason: HOSPADM

## 2020-10-05 RX ORDER — METOPROLOL TARTRATE 50 MG/1
50 TABLET, FILM COATED ORAL EVERY 12 HOURS SCHEDULED
Status: DISCONTINUED | OUTPATIENT
Start: 2020-10-05 | End: 2020-10-06

## 2020-10-05 RX ORDER — SODIUM BICARBONATE 325 MG/1
325 TABLET ORAL 2 TIMES DAILY
Status: DISCONTINUED | OUTPATIENT
Start: 2020-10-05 | End: 2020-10-06 | Stop reason: HOSPADM

## 2020-10-05 RX ORDER — OXYBUTYNIN CHLORIDE 5 MG/1
5 TABLET, EXTENDED RELEASE ORAL DAILY
Status: DISCONTINUED | OUTPATIENT
Start: 2020-10-06 | End: 2020-10-06 | Stop reason: HOSPADM

## 2020-10-05 RX ORDER — MONTELUKAST SODIUM 10 MG/1
10 TABLET ORAL NIGHTLY
Status: DISCONTINUED | OUTPATIENT
Start: 2020-10-05 | End: 2020-10-06 | Stop reason: HOSPADM

## 2020-10-05 RX ORDER — NICOTINE 21 MG/24HR
1 PATCH, TRANSDERMAL 24 HOURS TRANSDERMAL EVERY 24 HOURS
Status: DISCONTINUED | OUTPATIENT
Start: 2020-10-05 | End: 2020-10-06 | Stop reason: HOSPADM

## 2020-10-05 RX ADMIN — ENOXAPARIN SODIUM 40 MG: 40 INJECTION SUBCUTANEOUS at 20:42

## 2020-10-05 RX ADMIN — FUROSEMIDE 40 MG: 40 TABLET ORAL at 20:42

## 2020-10-05 RX ADMIN — SODIUM CHLORIDE, POTASSIUM CHLORIDE, SODIUM LACTATE AND CALCIUM CHLORIDE 1000 ML: 600; 310; 30; 20 INJECTION, SOLUTION INTRAVENOUS at 16:13

## 2020-10-05 RX ADMIN — GABAPENTIN 800 MG: 400 CAPSULE ORAL at 20:42

## 2020-10-05 RX ADMIN — LIDOCAINE 1 PATCH: 50 PATCH CUTANEOUS at 20:42

## 2020-10-05 RX ADMIN — PANTOPRAZOLE SODIUM 40 MG: 40 TABLET, DELAYED RELEASE ORAL at 20:42

## 2020-10-05 RX ADMIN — ORPHENADRINE CITRATE 60 MG: 30 INJECTION INTRAMUSCULAR; INTRAVENOUS at 16:18

## 2020-10-05 RX ADMIN — SODIUM CHLORIDE, PRESERVATIVE FREE 10 ML: 5 INJECTION INTRAVENOUS at 20:46

## 2020-10-05 RX ADMIN — MORPHINE SULFATE 1 MG: 2 INJECTION, SOLUTION INTRAMUSCULAR; INTRAVENOUS at 20:46

## 2020-10-05 RX ADMIN — SODIUM CHLORIDE 50 ML/HR: 9 INJECTION, SOLUTION INTRAVENOUS at 20:47

## 2020-10-05 RX ADMIN — NICOTINE 1 PATCH: 21 PATCH, EXTENDED RELEASE TRANSDERMAL at 20:42

## 2020-10-05 RX ADMIN — METOPROLOL TARTRATE 50 MG: 50 TABLET, FILM COATED ORAL at 20:42

## 2020-10-05 RX ADMIN — MONTELUKAST SODIUM 10 MG: 10 TABLET, COATED ORAL at 20:42

## 2020-10-05 RX ADMIN — INSULIN DETEMIR 95 UNITS: 100 INJECTION, SOLUTION SUBCUTANEOUS at 20:47

## 2020-10-05 RX ADMIN — SODIUM BICARBONATE TAB 325 MG 325 MG: 325 TAB at 20:41

## 2020-10-05 NOTE — ED PROVIDER NOTES
Subjective   About a week ago the patient developed neck pain, which she frequently gets, as of this time it did not resolve.  The neck pain slowly got worse and worse.  Then moved into her chest.  It hurts whenever she coughs.  No shortness of breath.  No fevers or chills.      Chest Pain  Pain location:  L chest  Pain quality: aching and sharp    Pain radiates to:  Neck  Pain severity:  Moderate  Onset quality:  Gradual  Duration:  1 week  Timing:  Constant  Progression:  Worsening  Chronicity:  Recurrent  Context: movement and at rest    Context: not breathing, not lifting and not stress    Relieved by:  Nothing  Worsened by:  Exertion, movement and deep breathing  Ineffective treatments:  None tried  Associated symptoms: cough    Associated symptoms: no abdominal pain, no diaphoresis, no dizziness, no fatigue, no fever, no headache, no nausea, no palpitations and no shortness of breath        Review of Systems   Constitutional: Positive for activity change. Negative for appetite change, chills, diaphoresis, fatigue and fever.   HENT: Negative for congestion and rhinorrhea.    Respiratory: Positive for cough. Negative for shortness of breath and wheezing.    Cardiovascular: Positive for chest pain. Negative for palpitations and leg swelling.   Gastrointestinal: Negative for abdominal pain, diarrhea and nausea.   Genitourinary: Negative for dysuria and flank pain.   Musculoskeletal: Positive for myalgias and neck pain. Negative for neck stiffness.   Skin: Negative for color change and rash.   Neurological: Negative for dizziness and headaches.   Psychiatric/Behavioral: Negative for agitation. The patient is not nervous/anxious.        Past Medical History:   Diagnosis Date   • Anxiety states    • Asthma    • Carotid artery stenosis     DWIGHT 0-15%, LICA 0-15%. LICA stent 5/2014.   • Chronic folliculitis    • Chronic obstructive lung disease (CMS/HCC)    • Coronary arteriosclerosis    • Diabetes mellitus (CMS/HCC)      no retinopathy; A1C 9.1      • Dyslipidemia    • Essential hypertension    • Excoriated eczema     asteosis/keratosis   • GERD (gastroesophageal reflux disease)    • History of colon polyps    • Hypertensive disorder    • Kidney stone    • Mixed hyperlipidemia    • Morbid obesity due to excess calories (CMS/[facility])     BMI 44.5   • Neurologic disorder associated with diabetes mellitus (CMS/[facility])    • Non-healing surgical wound     LLE EVHa   • Peripheral vascular disease (CMS/[facility])    • Sleep apnea    • Tobacco dependence syndrome     1/2ppd      • Type 2 diabetes mellitus (CMS/[facility])    • Vitamin D deficiency        Allergies   Allergen Reactions   • Adhesive Tape Hives   • Other      Bandaids, MRSA, SURECLOSE       Past Surgical History:   Procedure Laterality Date   • CARDIAC CATHETERIZATION  08/09/2013    Severe multivessel CAD with critical lesions noted in the RCA, obtuse marginal two, ramus intermemdious, and LAD as described above. Normal LV systolic function with no wall motion abnormality.    • CARDIAC CATHETERIZATION  05/27/2014     LEFT Carotid Stent    • CARDIAC CATHETERIZATION N/A 7/14/2020    Procedure: Right Heart Cath;  Surgeon: Eugenio Barragan MD PhD;  Location: [facility] CATH INVASIVE LOCATION;  Service: Cardiology;  Laterality: N/A;   • CAROTID STENT Left 05/27/2014   • COLONOSCOPY  05/02/2016    Normal colon.Diverticulosis in the sigmoid colon.No specimens collected.    • CORONARY ARTERY BYPASS GRAFT  11/15/2013    CABG x 3 with LIMA to LAD and coronary endarterectomy to LAD, SVG to Ramus intermedius branch and SVG to PDA.    • CYSTOSCOPY URETEROSCOPY LASER LITHOTRIPSY     • ENDOSCOPY  09/23/2015     Esophageal stricture present.Normal stomach.Normal duodenum.    • ENDOSCOPY  11/16/2015    w/ dilatation - Esophageal stricture present,Dilatation performed.Normal stomach and duodenum.    • HYSTERECTOMY     • INCISION AND DRAINAGE ABSCESS  01/02/2016    Incision and drainage of right perineal  "abscess.    • INCISION AND DRAINAGE ABSCESS  02/18/2014    Incision and Drainage of abscess of left lower leg    • OTHER SURGICAL HISTORY  01/25/2016    DESTRUCTION OF BENIGN LESION      • OTHER SURGICAL HISTORY  04/18/2014    ear/neck - L attempted CEA, Neck Dissection    • TUBAL ABDOMINAL LIGATION         Family History   Problem Relation Age of Onset   • Heart disease Mother    • Cancer Mother    • Heart disease Father    • Heart disease Sister    • Cancer Sister    • Heart disease Brother        Social History     Socioeconomic History   • Marital status:      Spouse name: wesley dumont   • Number of children: Not on file   • Years of education: Not on file   • Highest education level: Not on file   Tobacco Use   • Smoking status: Current Every Day Smoker     Packs/day: 0.25     Years: 40.00     Pack years: 10.00     Types: Cigarettes   • Smokeless tobacco: Never Used   Substance and Sexual Activity   • Alcohol use: No   • Drug use: Yes     Types: LSD, Marijuana, Methamphetamines     Comment: Lasted used in 2006   • Sexual activity: Not Currently     Partners: Male           Objective    Vitals:    10/05/20 1453 10/05/20 1617   BP: 139/81 131/73   BP Location: Left arm Left arm   Patient Position: Sitting Lying   Pulse: 91 86   Resp: 22 20   Temp: 97.7 °F (36.5 °C)    TempSrc: Infrared    SpO2: 95% 92%   Weight: 128 kg (282 lb)    Height: 157.5 cm (62\")        Physical Exam  Vitals signs and nursing note reviewed.   Constitutional:       General: She is not in acute distress.     Appearance: She is well-developed. She is not ill-appearing, toxic-appearing or diaphoretic.   HENT:      Head: Normocephalic.      Right Ear: External ear normal.      Left Ear: External ear normal.   Eyes:      Conjunctiva/sclera: Conjunctivae normal.   Neck:      Musculoskeletal: Normal range of motion. Normal range of motion. Pain with movement and muscular tenderness present. No neck rigidity or spinous process tenderness. "   Cardiovascular:      Pulses: Normal pulses.   Pulmonary:      Effort: Pulmonary effort is normal. No accessory muscle usage or respiratory distress.      Breath sounds: No decreased breath sounds or wheezing.   Chest:      Chest wall: Tenderness present.   Abdominal:      General: Bowel sounds are normal.      Palpations: Abdomen is soft. Abdomen is not rigid.      Tenderness: There is no abdominal tenderness (deep palpation).   Skin:     General: Skin is warm and dry.      Capillary Refill: Capillary refill takes less than 2 seconds.   Neurological:      Mental Status: She is alert and oriented to person, place, and time. She is not disoriented.      GCS: GCS eye subscore is 4. GCS verbal subscore is 5. GCS motor subscore is 6.      Cranial Nerves: No cranial nerve deficit (grossly intact).   Psychiatric:         Behavior: Behavior normal.         ECG 12 Lead      Date/Time: 10/5/2020 4:09 PM  Performed by: Bladimir Morillo MD  Authorized by: Bladimir Morillo MD   Interpreted by physician  Rhythm: sinus rhythm  Rate: normal  QRS axis: left  ST Segments: ST segments normal  Other findings: prolonged QTc interval                 ED Course      Results for orders placed or performed during the hospital encounter of 10/05/20   Basic Metabolic Panel    Specimen: Blood   Result Value Ref Range    Glucose 265 (H) 65 - 99 mg/dL    BUN 35 (H) 8 - 23 mg/dL    Creatinine 1.79 (H) 0.57 - 1.00 mg/dL    Sodium 135 (L) 136 - 145 mmol/L    Potassium 4.3 3.5 - 5.2 mmol/L    Chloride 100 98 - 107 mmol/L    CO2 22.0 22.0 - 29.0 mmol/L    Calcium 9.0 8.6 - 10.5 mg/dL    eGFR Non African Amer 29 (L) >60 mL/min/1.73    BUN/Creatinine Ratio 19.6 7.0 - 25.0    Anion Gap 13.0 5.0 - 15.0 mmol/L   BNP    Specimen: Blood   Result Value Ref Range    proBNP 246.0 0.0 - 900.0 pg/mL   Troponin    Specimen: Blood   Result Value Ref Range    Troponin T <0.010 0.000 - 0.030 ng/mL   CBC Auto Differential    Specimen: Blood   Result Value Ref  Range    WBC 11.70 (H) 3.40 - 10.80 10*3/mm3    RBC 4.31 3.77 - 5.28 10*6/mm3    Hemoglobin 11.6 (L) 12.0 - 15.9 g/dL    Hematocrit 36.8 34.0 - 46.6 %    MCV 85.4 79.0 - 97.0 fL    MCH 26.9 26.6 - 33.0 pg    MCHC 31.5 31.5 - 35.7 g/dL    RDW 15.5 (H) 12.3 - 15.4 %    RDW-SD 48.3 37.0 - 54.0 fl    MPV 9.2 6.0 - 12.0 fL    Platelets 285 140 - 450 10*3/mm3    Neutrophil % 63.2 42.7 - 76.0 %    Lymphocyte % 24.3 19.6 - 45.3 %    Monocyte % 6.4 5.0 - 12.0 %    Eosinophil % 4.5 0.3 - 6.2 %    Basophil % 0.9 0.0 - 1.5 %    Immature Grans % 0.7 (H) 0.0 - 0.5 %    Neutrophils, Absolute 7.40 (H) 1.70 - 7.00 10*3/mm3    Lymphocytes, Absolute 2.84 0.70 - 3.10 10*3/mm3    Monocytes, Absolute 0.75 0.10 - 0.90 10*3/mm3    Eosinophils, Absolute 0.53 (H) 0.00 - 0.40 10*3/mm3    Basophils, Absolute 0.10 0.00 - 0.20 10*3/mm3    Immature Grans, Absolute 0.08 (H) 0.00 - 0.05 10*3/mm3    nRBC 0.0 0.0 - 0.2 /100 WBC   Light Blue Top   Result Value Ref Range    Extra Tube hold for add-on    Green Top (Gel)   Result Value Ref Range    Extra Tube Hold for add-ons.    Lavender Top   Result Value Ref Range    Extra Tube hold for add-on    Gold Top - SST   Result Value Ref Range    Extra Tube Hold for add-ons.      XR Chest 2 View   Final Result   Cardiomegaly. Midline sternal sutures from prior   cardiac surgery. Pulmonary vascular prominence. Otherwise   unremarkable chest.      Electronically signed by:  Hugo Russo MD  10/5/2020 3:56 PM CDT   Workstation: [id number]                                         HEART Score (for prediction of 6-week risk of major adverse cardiac event) reviewed and/or performed as part of the patient evaluation and treatment planning process.  The result associated with this review/performance is: 4       MDM  Number of Diagnoses or Management Options  Chest pain, unspecified type: new and requires workup  Chest wall pain: new and requires workup  Neck pain: new and requires workup  Diagnosis management  comments: Vital signs are stable, afebrile.  Labs obtained and are unremarkable.  Troponin negative x1.  EKG is nonspecific.  BNP is negative as well.  Cobra test is pending.  Chest x-ray shows no acute cardiopulmonary processes.  Shows cardiomegaly with prominent pulmonary vasculature.  Patient has numerous risk factors including prior CABG, current cigarette smoking, diabetes.  Although the patient's pain does sound more musculoskeletal in nature, due to her risk factors and elevated heart score with non-specific EKG changes I spoke with the on-call resident who agreed to admit for further evaluation and treatment.       Amount and/or Complexity of Data Reviewed  Clinical lab tests: ordered and reviewed  Tests in the radiology section of CPT®: ordered and reviewed  Tests in the medicine section of CPT®: ordered and reviewed  Decide to obtain previous medical records or to obtain history from someone other than the patient: yes  Review and summarize past medical records: yes  Discuss the patient with other providers: yes    Patient Progress  Patient progress: improved      Final diagnoses:   Chest pain, unspecified type   Chest wall pain   Neck pain            Bladimir Morillo MD  10/05/20 [id number]

## 2020-10-05 NOTE — ED NOTES
Called telemetry for tele-box communication for box #[id number].  X2.  Requested ed tech to go to pt room to assist 2with lead placement.     Bouchra Duff RN  10/05/20 [id number]

## 2020-10-06 ENCOUNTER — APPOINTMENT (OUTPATIENT)
Dept: CARDIOLOGY | Facility: HOSPITAL | Age: 60
End: 2020-10-06

## 2020-10-06 VITALS
RESPIRATION RATE: 18 BRPM | BODY MASS INDEX: 51.12 KG/M2 | WEIGHT: 277.78 LBS | TEMPERATURE: 97 F | SYSTOLIC BLOOD PRESSURE: 122 MMHG | DIASTOLIC BLOOD PRESSURE: 75 MMHG | OXYGEN SATURATION: 93 % | HEIGHT: 62 IN | HEART RATE: 94 BPM

## 2020-10-06 LAB
ANION GAP SERPL CALCULATED.3IONS-SCNC: 11 MMOL/L (ref 5–15)
BASOPHILS # BLD AUTO: 0.09 10*3/MM3 (ref 0–0.2)
BASOPHILS NFR BLD AUTO: 0.9 % (ref 0–1.5)
BH CV ECHO MEAS - ACS: 1.6 CM
BH CV ECHO MEAS - AO ISTHMUS: 2.4 CM
BH CV ECHO MEAS - AO MAX PG (FULL): 5.8 MMHG
BH CV ECHO MEAS - AO MAX PG: 9.9 MMHG
BH CV ECHO MEAS - AO MEAN PG (FULL): 4 MMHG
BH CV ECHO MEAS - AO MEAN PG: 6 MMHG
BH CV ECHO MEAS - AO ROOT AREA (BSA CORRECTED): 1.2
BH CV ECHO MEAS - AO ROOT AREA: 5.7 CM^2
BH CV ECHO MEAS - AO ROOT DIAM: 2.7 CM
BH CV ECHO MEAS - AO V2 MAX: 157 CM/SEC
BH CV ECHO MEAS - AO V2 MEAN: 108 CM/SEC
BH CV ECHO MEAS - AO V2 VTI: 23.4 CM
BH CV ECHO MEAS - ASC AORTA: 2.9 CM
BH CV ECHO MEAS - AVA(I,A): 1.8 CM^2
BH CV ECHO MEAS - AVA(I,D): 1.8 CM^2
BH CV ECHO MEAS - AVA(V,A): 1.8 CM^2
BH CV ECHO MEAS - AVA(V,D): 1.8 CM^2
BH CV ECHO MEAS - BSA(HAYCOCK): 2.4 M^2
BH CV ECHO MEAS - BSA: 2.2 M^2
BH CV ECHO MEAS - BZI_BMI: 50.7 KILOGRAMS/M^2
BH CV ECHO MEAS - BZI_METRIC_HEIGHT: 157.5 CM
BH CV ECHO MEAS - BZI_METRIC_WEIGHT: 125.6 KG
BH CV ECHO MEAS - EDV(CUBED): 60.2 ML
BH CV ECHO MEAS - EDV(MOD-SP2): 91.3 ML
BH CV ECHO MEAS - EDV(MOD-SP4): 87.5 ML
BH CV ECHO MEAS - EDV(TEICH): 66.7 ML
BH CV ECHO MEAS - EF(CUBED): 74.4 %
BH CV ECHO MEAS - EF(MOD-SP2): 74.4 %
BH CV ECHO MEAS - EF(MOD-SP4): 75.9 %
BH CV ECHO MEAS - EF(TEICH): 66.9 %
BH CV ECHO MEAS - ESV(CUBED): 15.4 ML
BH CV ECHO MEAS - ESV(MOD-SP2): 23.4 ML
BH CV ECHO MEAS - ESV(MOD-SP4): 21.1 ML
BH CV ECHO MEAS - ESV(TEICH): 22.1 ML
BH CV ECHO MEAS - FS: 36.5 %
BH CV ECHO MEAS - IVS/LVPW: 1.1
BH CV ECHO MEAS - IVSD: 1.4 CM
BH CV ECHO MEAS - LA DIMENSION: 3.2 CM
BH CV ECHO MEAS - LA/AO: 1.2
BH CV ECHO MEAS - LV DIASTOLIC VOL/BSA (35-75): 39.9 ML/M^2
BH CV ECHO MEAS - LV MASS(C)D: 195.2 GRAMS
BH CV ECHO MEAS - LV MASS(C)DI: 88.9 GRAMS/M^2
BH CV ECHO MEAS - LV MAX PG: 4.1 MMHG
BH CV ECHO MEAS - LV MEAN PG: 2 MMHG
BH CV ECHO MEAS - LV SYSTOLIC VOL/BSA (12-30): 9.6 ML/M^2
BH CV ECHO MEAS - LV V1 MAX: 101 CM/SEC
BH CV ECHO MEAS - LV V1 MEAN: 62 CM/SEC
BH CV ECHO MEAS - LV V1 VTI: 15.2 CM
BH CV ECHO MEAS - LVIDD: 3.9 CM
BH CV ECHO MEAS - LVIDS: 2.5 CM
BH CV ECHO MEAS - LVLD AP2: 7.7 CM
BH CV ECHO MEAS - LVLD AP4: 8 CM
BH CV ECHO MEAS - LVLS AP2: 6.8 CM
BH CV ECHO MEAS - LVLS AP4: 6.8 CM
BH CV ECHO MEAS - LVOT AREA (M): 2.8 CM^2
BH CV ECHO MEAS - LVOT AREA: 2.8 CM^2
BH CV ECHO MEAS - LVOT DIAM: 1.9 CM
BH CV ECHO MEAS - LVPWD: 1.3 CM
BH CV ECHO MEAS - MV A MAX VEL: 74.6 CM/SEC
BH CV ECHO MEAS - MV DEC SLOPE: 541 CM/SEC^2
BH CV ECHO MEAS - MV E MAX VEL: 61.7 CM/SEC
BH CV ECHO MEAS - MV E/A: 0.83
BH CV ECHO MEAS - MV MAX PG: 5.5 MMHG
BH CV ECHO MEAS - MV MEAN PG: 3 MMHG
BH CV ECHO MEAS - MV P1/2T MAX VEL: 101 CM/SEC
BH CV ECHO MEAS - MV P1/2T: 54.7 MSEC
BH CV ECHO MEAS - MV V2 MAX: 117 CM/SEC
BH CV ECHO MEAS - MV V2 MEAN: 89.7 CM/SEC
BH CV ECHO MEAS - MV V2 VTI: 21.8 CM
BH CV ECHO MEAS - MVA P1/2T LCG: 2.2 CM^2
BH CV ECHO MEAS - MVA(P1/2T): 4 CM^2
BH CV ECHO MEAS - MVA(VTI): 2 CM^2
BH CV ECHO MEAS - PA MAX PG: 4.9 MMHG
BH CV ECHO MEAS - PA V2 MAX: 111 CM/SEC
BH CV ECHO MEAS - RAP SYSTOLE: 10 MMHG
BH CV ECHO MEAS - RVDD: 2.4 CM
BH CV ECHO MEAS - RVSP: 10 MMHG
BH CV ECHO MEAS - SI(AO): 61 ML/M^2
BH CV ECHO MEAS - SI(CUBED): 20.4 ML/M^2
BH CV ECHO MEAS - SI(LVOT): 19.6 ML/M^2
BH CV ECHO MEAS - SI(MOD-SP2): 30.9 ML/M^2
BH CV ECHO MEAS - SI(MOD-SP4): 30.2 ML/M^2
BH CV ECHO MEAS - SI(TEICH): 20.3 ML/M^2
BH CV ECHO MEAS - SV(AO): 134 ML
BH CV ECHO MEAS - SV(CUBED): 44.8 ML
BH CV ECHO MEAS - SV(LVOT): 43.1 ML
BH CV ECHO MEAS - SV(MOD-SP2): 67.9 ML
BH CV ECHO MEAS - SV(MOD-SP4): 66.4 ML
BH CV ECHO MEAS - SV(TEICH): 44.6 ML
BUN SERPL-MCNC: 34 MG/DL (ref 8–23)
BUN/CREAT SERPL: 19.1 (ref 7–25)
CALCIUM SPEC-SCNC: 9.2 MG/DL (ref 8.6–10.5)
CHLORIDE SERPL-SCNC: 103 MMOL/L (ref 98–107)
CO2 SERPL-SCNC: 24 MMOL/L (ref 22–29)
CREAT SERPL-MCNC: 1.78 MG/DL (ref 0.57–1)
DEPRECATED RDW RBC AUTO: 48 FL (ref 37–54)
EOSINOPHIL # BLD AUTO: 0.63 10*3/MM3 (ref 0–0.4)
EOSINOPHIL NFR BLD AUTO: 6.2 % (ref 0.3–6.2)
ERYTHROCYTE [DISTWIDTH] IN BLOOD BY AUTOMATED COUNT: 15.6 % (ref 12.3–15.4)
FERRITIN SERPL-MCNC: 48.82 NG/ML (ref 13–150)
GFR SERPL CREATININE-BSD FRML MDRD: 29 ML/MIN/1.73
GLUCOSE BLDC GLUCOMTR-MCNC: 126 MG/DL (ref 70–130)
GLUCOSE BLDC GLUCOMTR-MCNC: 163 MG/DL (ref 70–130)
GLUCOSE BLDC GLUCOMTR-MCNC: 368 MG/DL (ref 70–130)
GLUCOSE SERPL-MCNC: 155 MG/DL (ref 65–99)
HCT VFR BLD AUTO: 35.9 % (ref 34–46.6)
HGB BLD-MCNC: 11.4 G/DL (ref 12–15.9)
IMM GRANULOCYTES # BLD AUTO: 0.06 10*3/MM3 (ref 0–0.05)
IMM GRANULOCYTES NFR BLD AUTO: 0.6 % (ref 0–0.5)
IRON 24H UR-MRATE: 31 MCG/DL (ref 37–145)
IRON SATN MFR SERPL: 9 % (ref 20–50)
LYMPHOCYTES # BLD AUTO: 2.87 10*3/MM3 (ref 0.7–3.1)
LYMPHOCYTES NFR BLD AUTO: 28.4 % (ref 19.6–45.3)
MAXIMAL PREDICTED HEART RATE: 159 BPM
MCH RBC QN AUTO: 26.9 PG (ref 26.6–33)
MCHC RBC AUTO-ENTMCNC: 31.8 G/DL (ref 31.5–35.7)
MCV RBC AUTO: 84.7 FL (ref 79–97)
MONOCYTES # BLD AUTO: 0.63 10*3/MM3 (ref 0.1–0.9)
MONOCYTES NFR BLD AUTO: 6.2 % (ref 5–12)
NEUTROPHILS NFR BLD AUTO: 5.81 10*3/MM3 (ref 1.7–7)
NEUTROPHILS NFR BLD AUTO: 57.7 % (ref 42.7–76)
NRBC BLD AUTO-RTO: 0 /100 WBC (ref 0–0.2)
PLATELET # BLD AUTO: 265 10*3/MM3 (ref 140–450)
PMV BLD AUTO: 9.3 FL (ref 6–12)
POTASSIUM SERPL-SCNC: 3.5 MMOL/L (ref 3.5–5.2)
RBC # BLD AUTO: 4.24 10*6/MM3 (ref 3.77–5.28)
SODIUM SERPL-SCNC: 138 MMOL/L (ref 136–145)
STRESS TARGET HR: 135 BPM
TIBC SERPL-MCNC: 337 MCG/DL (ref 298–536)
TRANSFERRIN SERPL-MCNC: 226 MG/DL (ref 200–360)
WBC # BLD AUTO: 10.09 10*3/MM3 (ref 3.4–10.8)

## 2020-10-06 PROCEDURE — 25010000002 MORPHINE PER 10 MG: Performed by: STUDENT IN AN ORGANIZED HEALTH CARE EDUCATION/TRAINING PROGRAM

## 2020-10-06 PROCEDURE — 80048 BASIC METABOLIC PNL TOTAL CA: CPT | Performed by: STUDENT IN AN ORGANIZED HEALTH CARE EDUCATION/TRAINING PROGRAM

## 2020-10-06 PROCEDURE — 93306 TTE W/DOPPLER COMPLETE: CPT | Performed by: INTERNAL MEDICINE

## 2020-10-06 PROCEDURE — 99239 HOSP IP/OBS DSCHRG MGMT >30: CPT | Performed by: STUDENT IN AN ORGANIZED HEALTH CARE EDUCATION/TRAINING PROGRAM

## 2020-10-06 PROCEDURE — 84466 ASSAY OF TRANSFERRIN: CPT | Performed by: STUDENT IN AN ORGANIZED HEALTH CARE EDUCATION/TRAINING PROGRAM

## 2020-10-06 PROCEDURE — 85025 COMPLETE CBC W/AUTO DIFF WBC: CPT | Performed by: STUDENT IN AN ORGANIZED HEALTH CARE EDUCATION/TRAINING PROGRAM

## 2020-10-06 PROCEDURE — 25010000002 PERFLUTREN (DEFINITY) 8.476 MG IN SODIUM CHLORIDE 0.9 % 10 ML INJECTION: Performed by: STUDENT IN AN ORGANIZED HEALTH CARE EDUCATION/TRAINING PROGRAM

## 2020-10-06 PROCEDURE — 63710000001 INSULIN ASPART PER 5 UNITS: Performed by: STUDENT IN AN ORGANIZED HEALTH CARE EDUCATION/TRAINING PROGRAM

## 2020-10-06 PROCEDURE — G0378 HOSPITAL OBSERVATION PER HR: HCPCS

## 2020-10-06 PROCEDURE — 99214 OFFICE O/P EST MOD 30 MIN: CPT | Performed by: NURSE PRACTITIONER

## 2020-10-06 PROCEDURE — G0008 ADMIN INFLUENZA VIRUS VAC: HCPCS | Performed by: STUDENT IN AN ORGANIZED HEALTH CARE EDUCATION/TRAINING PROGRAM

## 2020-10-06 PROCEDURE — 82962 GLUCOSE BLOOD TEST: CPT

## 2020-10-06 PROCEDURE — 90686 IIV4 VACC NO PRSV 0.5 ML IM: CPT | Performed by: STUDENT IN AN ORGANIZED HEALTH CARE EDUCATION/TRAINING PROGRAM

## 2020-10-06 PROCEDURE — 96376 TX/PRO/DX INJ SAME DRUG ADON: CPT

## 2020-10-06 PROCEDURE — 25010000002 INFLUENZA VAC SPLIT QUAD 0.5 ML SUSPENSION PREFILLED SYRINGE: Performed by: STUDENT IN AN ORGANIZED HEALTH CARE EDUCATION/TRAINING PROGRAM

## 2020-10-06 PROCEDURE — 82728 ASSAY OF FERRITIN: CPT | Performed by: STUDENT IN AN ORGANIZED HEALTH CARE EDUCATION/TRAINING PROGRAM

## 2020-10-06 PROCEDURE — 83540 ASSAY OF IRON: CPT | Performed by: STUDENT IN AN ORGANIZED HEALTH CARE EDUCATION/TRAINING PROGRAM

## 2020-10-06 PROCEDURE — 93306 TTE W/DOPPLER COMPLETE: CPT

## 2020-10-06 RX ORDER — ISOSORBIDE MONONITRATE 30 MG/1
30 TABLET, EXTENDED RELEASE ORAL
Status: DISCONTINUED | OUTPATIENT
Start: 2020-10-06 | End: 2020-10-06 | Stop reason: HOSPADM

## 2020-10-06 RX ORDER — METOPROLOL TARTRATE 50 MG/1
100 TABLET, FILM COATED ORAL EVERY 12 HOURS SCHEDULED
Status: DISCONTINUED | OUTPATIENT
Start: 2020-10-06 | End: 2020-10-06 | Stop reason: HOSPADM

## 2020-10-06 RX ORDER — ISOSORBIDE MONONITRATE 30 MG/1
30 TABLET, EXTENDED RELEASE ORAL
Qty: 30 TABLET | Refills: 0 | Status: SHIPPED | OUTPATIENT
Start: 2020-10-07 | End: 2020-10-09 | Stop reason: SDUPTHER

## 2020-10-06 RX ORDER — METOPROLOL TARTRATE 100 MG/1
100 TABLET ORAL EVERY 12 HOURS SCHEDULED
Qty: 60 TABLET | Refills: 0 | Status: SHIPPED | OUTPATIENT
Start: 2020-10-06 | End: 2020-10-09 | Stop reason: SDUPTHER

## 2020-10-06 RX ORDER — ASPIRIN 81 MG/1
81 TABLET ORAL DAILY
Status: DISCONTINUED | OUTPATIENT
Start: 2020-10-06 | End: 2020-10-06 | Stop reason: HOSPADM

## 2020-10-06 RX ORDER — LIDOCAINE 50 MG/G
OINTMENT TOPICAL AS NEEDED
Status: DISCONTINUED | OUTPATIENT
Start: 2020-10-06 | End: 2020-10-06 | Stop reason: HOSPADM

## 2020-10-06 RX ADMIN — OXYBUTYNIN CHLORIDE 5 MG: 5 TABLET, EXTENDED RELEASE ORAL at 09:50

## 2020-10-06 RX ADMIN — MORPHINE SULFATE 1 MG: 2 INJECTION, SOLUTION INTRAMUSCULAR; INTRAVENOUS at 15:18

## 2020-10-06 RX ADMIN — GABAPENTIN 800 MG: 400 CAPSULE ORAL at 15:18

## 2020-10-06 RX ADMIN — MORPHINE SULFATE 1 MG: 2 INJECTION, SOLUTION INTRAMUSCULAR; INTRAVENOUS at 02:11

## 2020-10-06 RX ADMIN — CLOPIDOGREL BISULFATE 75 MG: 75 TABLET ORAL at 09:50

## 2020-10-06 RX ADMIN — LISINOPRIL 10 MG: 10 TABLET ORAL at 09:50

## 2020-10-06 RX ADMIN — GABAPENTIN 800 MG: 400 CAPSULE ORAL at 05:56

## 2020-10-06 RX ADMIN — SODIUM BICARBONATE TAB 325 MG 325 MG: 325 TAB at 09:50

## 2020-10-06 RX ADMIN — FERROUS SULFATE TAB EC 324 MG (65 MG FE EQUIVALENT) 324 MG: 324 (65 FE) TABLET DELAYED RESPONSE at 09:50

## 2020-10-06 RX ADMIN — MORPHINE SULFATE 1 MG: 2 INJECTION, SOLUTION INTRAMUSCULAR; INTRAVENOUS at 09:49

## 2020-10-06 RX ADMIN — PANTOPRAZOLE SODIUM 40 MG: 40 TABLET, DELAYED RELEASE ORAL at 09:50

## 2020-10-06 RX ADMIN — INSULIN ASPART 8 UNITS: 100 INJECTION, SOLUTION INTRAVENOUS; SUBCUTANEOUS at 16:50

## 2020-10-06 RX ADMIN — ATORVASTATIN CALCIUM 40 MG: 40 TABLET, FILM COATED ORAL at 09:50

## 2020-10-06 RX ADMIN — ISOSORBIDE MONONITRATE 30 MG: 30 TABLET, EXTENDED RELEASE ORAL at 09:50

## 2020-10-06 RX ADMIN — CETIRIZINE HYDROCHLORIDE 10 MG: 10 TABLET, FILM COATED ORAL at 09:50

## 2020-10-06 RX ADMIN — INFLUENZA VIRUS VACCINE 0.5 ML: 15; 15; 15; 15 SUSPENSION INTRAMUSCULAR at 17:53

## 2020-10-06 RX ADMIN — PERFLUTREN 1.5 ML: 6.52 INJECTION, SUSPENSION INTRAVENOUS at 11:30

## 2020-10-06 NOTE — MEDICAL STUDENT
FAMILY MEDICINE DAILY PROGRESS NOTE    NAME: Yamileth Slater  : 1960  MRN: [id number]      LOS: 0 days     PROVIDER OF SERVICE: Conrado Sorenson, Medical Student    Chief Complaint: Chest pain    Subjective:     Interval History:  History taken from: patient chart  Patient Complaints: NAEO. Patient resting uncomfortably in bed on R side. Feels her pain has improved with 1mg morphine and norflex 60mg IV, now 6/10 from 8/10 in ED. Pain remains worst in R neck and back, radiating to chest and left neck/ear. Pain is made worse by movement. She denies any n/v, chills, sweating since admission. Pain worst on R night and upper back, but notes she also lays on her right side to sleep.    Also notes pain in RLE that comes and goes, has not noticed recent swelling or redness, but notes pain since last night. Denies history of blood clots but does have severe PAD, and states her recent SUNDEEP shows poor blood flow in both legs, but worse in RLE.     Patient Denies:  SOA, vision changes, extremity erythema/edema, abdominal pain, difficulty with urine or bowels, new n/t, n/v    Review of Systems:   Review of Systems   Constitutional: Negative for chills, diaphoresis and fever.   Eyes: Negative for pain and visual disturbance.   Respiratory: Negative for apnea, chest tightness and shortness of breath.    Cardiovascular: Positive for chest pain. Negative for palpitations and leg swelling.   Gastrointestinal: Negative for abdominal distention, abdominal pain, constipation, diarrhea, nausea and vomiting.   Genitourinary: Negative for dysuria, flank pain and hematuria.   Musculoskeletal: Positive for back pain, neck pain and neck stiffness.   Neurological: Negative for dizziness, weakness, numbness and headaches.   Psychiatric/Behavioral: Negative for agitation and confusion.       Objective:     Vital Signs  Temp:  [96.1 °F (35.6 °C)-98.2 °F (36.8 °C)] 96.2 °F (35.7 °C)  Heart Rate:  [78-92] 83  Resp:  [18-22] 18  BP:  (131-168)/(64-86) 140/73  Body mass index is 50.74 kg/m².    Physical Exam  Physical Exam  Constitutional:       Appearance: Normal appearance. She is ill-appearing.   HENT:      Right Ear: External ear normal.      Left Ear: External ear normal.      Nose: Nose normal.      Mouth/Throat:      Pharynx: No posterior oropharyngeal erythema.   Eyes:      Conjunctiva/sclera: Conjunctivae normal.   Neck:      Musculoskeletal: Muscular tenderness (worst in posterior, right side) present.   Cardiovascular:      Rate and Rhythm: Normal rate and regular rhythm.      Heart sounds: No murmur.      Comments: Faint pulses in LE bilaterally  Pulmonary:      Effort: Pulmonary effort is normal. No respiratory distress.      Breath sounds: Wheezing (in lung bases) present.   Abdominal:      General: Bowel sounds are normal. There is no distension.      Palpations: Abdomen is soft. There is no mass.      Tenderness: There is no abdominal tenderness.   Musculoskeletal:         General: Tenderness (worst over right neck and right upper back, radiates and reproducible through L neck, shoulder, and over sternum. Also tender over bony prominence of RLE) present. No swelling.   Skin:     General: Skin is warm and dry.      Capillary Refill: Capillary refill takes less than 2 seconds.   Neurological:      Mental Status: She is alert and oriented to person, place, and time.   Psychiatric:         Mood and Affect: Mood normal.         Behavior: Behavior normal.         Medication Review    Current Facility-Administered Medications:   •  acetaminophen (TYLENOL) tablet 650 mg, 650 mg, Oral, Q4H PRN **OR** acetaminophen (TYLENOL) 160 MG/5ML solution 650 mg, 650 mg, Oral, Q4H PRN **OR** acetaminophen (TYLENOL) suppository 650 mg, 650 mg, Rectal, Q4H PRN, Nirmal Calvillo MD  •  albuterol (PROVENTIL) nebulizer solution 0.083% 2.5 mg/3mL, 2.5 mg, Nebulization, Q4H PRN, Nirmal Calvillo MD  •  atorvastatin (LIPITOR) tablet 40 mg, 40 mg,  Oral, Daily, Nirmal Calvillo MD  •  cetirizine (zyrTEC) tablet 10 mg, 10 mg, Oral, Daily, Nirmal Calvillo MD  •  clopidogrel (PLAVIX) tablet 75 mg, 75 mg, Oral, Daily, Nirmal Calvillo MD  •  cyclobenzaprine (FLEXERIL) tablet 10 mg, 10 mg, Oral, BID PRN, Nirmal Calvillo MD  •  dextrose (D50W) 25 g/ 50mL Intravenous Solution 25 g, 25 g, Intravenous, Q15 Min PRN, Nirmal Calvillo MD  •  dextrose (GLUTOSE) oral gel 15 g, 15 g, Oral, Q15 Min PRN, Nirmal Calvillo MD  •  enoxaparin (LOVENOX) syringe 40 mg, 40 mg, Subcutaneous, Q24H, Nirmal Calvillo MD, 40 mg at 10/05/20 2042  •  ferrous sulfate EC tablet 324 mg, 324 mg, Oral, Daily With Breakfast, Nirmal Calvillo MD  •  furosemide (LASIX) tablet 40 mg, 40 mg, Oral, Daily, Nirmal Calvillo MD, 40 mg at 10/05/20 2042  •  gabapentin (NEURONTIN) capsule 800 mg, 800 mg, Oral, Q8H, Nirmal Calvillo MD, 800 mg at 10/06/20 0556  •  glucagon (human recombinant) (GLUCAGEN DIAGNOSTIC) injection 1 mg, 1 mg, Subcutaneous, Q15 Min PRN, Nirmal Calvillo MD  •  influenza vac split quad (FLUZONE,FLUARIX,AFLURIA,FLULAVAL) injection 0.5 mL, 0.5 mL, Intramuscular, During Hospitalization, Nirmal Calvillo MD  •  insulin aspart (novoLOG) injection 0-9 Units, 0-9 Units, Subcutaneous, TID AC, Nirmal Calvillo MD  •  insulin detemir (LEVEMIR) injection 95 Units, 95 Units, Subcutaneous, Q12H, Nirmal Calvillo MD, 95 Units at 10/05/20 2047  •  ipratropium-albuterol (DUO-NEB) nebulizer solution 3 mL, 3 mL, Nebulization, 4x Daily PRN, Nirmal Calvillo MD  •  lidocaine (LIDODERM) 5 % 1 patch, 1 patch, Transdermal, Q24H, Nirmal Calvillo MD, 1 patch at 10/05/20 2042  •  lisinopril (PRINIVIL,ZESTRIL) tablet 10 mg, 10 mg, Oral, Daily, Nirmal Calvillo MD  •  metoprolol tartrate (LOPRESSOR) tablet 50 mg, 50 mg, Oral, Q12H, Nirmal Calvillo MD, 50 mg at 10/05/20 2042  •  montelukast (SINGULAIR) tablet 10 mg, 10 mg, Oral, Nightly,  Nirmal Calvillo MD, 10 mg at 10/05/20 2042  •  morphine injection 1 mg, 1 mg, Intravenous, Q4H PRN, 1 mg at 10/06/20 0211 **AND** naloxone (NARCAN) injection 0.4 mg, 0.4 mg, Intravenous, Q5 Min PRN, Nirmal Calvillo MD  •  nicotine (NICODERM CQ) 21 MG/24HR patch 1 patch, 1 patch, Transdermal, Q24H, Nirmal Calvillo MD, 1 patch at 10/05/20 2042  •  nitroglycerin (NITROSTAT) SL tablet 0.4 mg, 0.4 mg, Sublingual, PRN, Nirmal Calvillo MD  •  ondansetron (ZOFRAN) tablet 4 mg, 4 mg, Oral, Q6H PRN **OR** ondansetron (ZOFRAN) injection 4 mg, 4 mg, Intravenous, Q6H PRN, Nirmal Calvillo MD  •  oxybutynin XL (DITROPAN-XL) 24 hr tablet 5 mg, 5 mg, Oral, Daily, Nirmal Calvillo MD  •  pantoprazole (PROTONIX) EC tablet 40 mg, 40 mg, Oral, Daily, Nirmal Calvillo MD, 40 mg at 10/05/20 2042  •  sodium bicarbonate tablet 325 mg, 325 mg, Oral, BID, Nirmal Calvillo MD, 325 mg at 10/05/20 2041  •  sodium chloride 0.9 % flush 10 mL, 10 mL, Intravenous, Q12H, Nirmal Calvillo MD, 10 mL at 10/05/20 2046  •  sodium chloride 0.9 % flush 10 mL, 10 mL, Intravenous, PRN, Nirmal Calvillo MD  •  sodium chloride 0.9 % infusion, 50 mL/hr, Intravenous, Continuous, Nirmal Calvillo MD, Last Rate: 50 mL/hr at 10/06/20 0522, 50 mL/hr at 10/06/20 0522     Diagnostic Data    Lab Results (last 24 hours)     Procedure Component Value Units Date/Time    POC Glucose Once [[id number]]  (Abnormal) Collected: 10/06/20 0629    Specimen: Blood Updated: 10/06/20 0655     Glucose 163 mg/dL     Basic Metabolic Panel [[id number]]  (Abnormal) Collected: 10/06/20 0544    Specimen: Blood Updated: 10/06/20 0620     Glucose 155 mg/dL      BUN 34 mg/dL      Creatinine 1.78 mg/dL      Sodium 138 mmol/L      Potassium 3.5 mmol/L      Chloride 103 mmol/L      CO2 24.0 mmol/L      Calcium 9.2 mg/dL      eGFR Non African Amer 29 mL/min/1.73      BUN/Creatinine Ratio 19.1     Anion Gap 11.0 mmol/L     Narrative:      GFR Normal  >60  Chronic Kidney Disease <60  Kidney Failure <15      CBC & Differential [[id number]]  (Abnormal) Collected: 10/06/20 0544    Specimen: Blood Updated: 10/06/20 0606    Narrative:      The following orders were created for panel order CBC & Differential.  Procedure                               Abnormality         Status                     ---------                               -----------         ------                     CBC Auto Differential[[id number]]        Abnormal            Final result                 Please view results for these tests on the individual orders.    CBC Auto Differential [[id number]]  (Abnormal) Collected: 10/06/20 0544    Specimen: Blood Updated: 10/06/20 0606     WBC 10.09 10*3/mm3      RBC 4.24 10*6/mm3      Hemoglobin 11.4 g/dL      Hematocrit 35.9 %      MCV 84.7 fL      MCH 26.9 pg      MCHC 31.8 g/dL      RDW 15.6 %      RDW-SD 48.0 fl      MPV 9.3 fL      Platelets 265 10*3/mm3      Neutrophil % 57.7 %      Lymphocyte % 28.4 %      Monocyte % 6.2 %      Eosinophil % 6.2 %      Basophil % 0.9 %      Immature Grans % 0.6 %      Neutrophils, Absolute 5.81 10*3/mm3      Lymphocytes, Absolute 2.87 10*3/mm3      Monocytes, Absolute 0.63 10*3/mm3      Eosinophils, Absolute 0.63 10*3/mm3      Basophils, Absolute 0.09 10*3/mm3      Immature Grans, Absolute 0.06 10*3/mm3      nRBC 0.0 /100 WBC     Troponin [[id number]]  (Normal) Collected: 10/05/20 2257    Specimen: Blood Updated: 10/05/20 2330     Troponin T <0.010 ng/mL     Narrative:      Troponin T Reference Range:  <= 0.03 ng/mL-   Negative for AMI  >0.03 ng/mL-     Abnormal for myocardial necrosis.  Clinicians would have to utilize clinical acumen, EKG, Troponin and serial changes to determine if it is an Acute Myocardial Infarction or myocardial injury due to an underlying chronic condition.       Results may be falsely decreased if patient taking Biotin.      COVID PRE-OP / PRE-PROCEDURE SCREENING ORDER (NO ISOLATION) - Swab,  Nasopharynx [[id number]]  (Normal) Collected: 10/05/20 1853    Specimen: Swab from Nasopharynx Updated: 10/05/20 2306    Narrative:      The following orders were created for panel order COVID PRE-OP / PRE-PROCEDURE SCREENING ORDER (NO ISOLATION) - Swab, Nasopharynx.  Procedure                               Abnormality         Status                     ---------                               -----------         ------                     COVID-19, [facility] IN-HOUS...[[id number]]  Normal              Final result                 Please view results for these tests on the individual orders.    COVID-19, [facility] IN-HOUSE, NP SWAB IN TRANSPORT MEDIA 8-10 HR TAT - Swab, Nasopharynx [[id number]]  (Normal) Collected: 10/05/20 1853    Specimen: Swab from Nasopharynx Updated: 10/05/20 2306     COVID19 Not Detected    Narrative:      Testing performed by Real Time RT-PCR  This test has not been approved by the [facility] but is authorized under the Emergency Use Act (EUA)    Fact sheet for providers: https://www.fda.gov/media/587574/download    Fact sheet for patients: https://www.fda.gov/media/374273/download        Troponin [[id number]]  (Normal) Collected: 10/05/20 2004    Specimen: Blood Updated: 10/05/20 2025     Troponin T <0.010 ng/mL     Narrative:      Troponin T Reference Range:  <= 0.03 ng/mL-   Negative for AMI  >0.03 ng/mL-     Abnormal for myocardial necrosis.  Clinicians would have to utilize clinical acumen, EKG, Troponin and serial changes to determine if it is an Acute Myocardial Infarction or myocardial injury due to an underlying chronic condition.       Results may be falsely decreased if patient taking Biotin.      [place] Draw [[id number]] Collected: 10/05/20 1513    Specimen: Blood Updated: 10/05/20 1616    Narrative:      The following orders were created for panel order [place] Draw.  Procedure                               Abnormality         Status                     ---------                                -----------         ------                     Light Blue Top[[id number]]                                   Final result               Green Top (Gel)[[id number]]                                  Final result               Lavender Top[[id number]]                                     Final result               Gold Top - SST[[id number]]                                   Final result                 Please view results for these tests on the individual orders.    Light Blue Top [[id number]] Collected: 10/05/20 1513    Specimen: Blood Updated: 10/05/20 1616     Extra Tube hold for add-on     Comment: Auto resulted       Green Top (Gel) [[id number]] Collected: 10/05/20 1513    Specimen: Blood Updated: 10/05/20 1616     Extra Tube Hold for add-ons.     Comment: Auto resulted.       Lavender Top [[id number]] Collected: 10/05/20 1513    Specimen: Blood Updated: 10/05/20 1616     Extra Tube hold for add-on     Comment: Auto resulted       Gold Top - SST [[id number]] Collected: 10/05/20 1513    Specimen: Blood Updated: 10/05/20 1616     Extra Tube Hold for add-ons.     Comment: Auto resulted.       Basic Metabolic Panel [[id number]]  (Abnormal) Collected: 10/05/20 1513    Specimen: Blood Updated: 10/05/20 1545     Glucose 265 mg/dL      BUN 35 mg/dL      Creatinine 1.79 mg/dL      Sodium 135 mmol/L      Potassium 4.3 mmol/L      Comment: Slight hemolysis detected by analyzer. Results may be affected.        Chloride 100 mmol/L      CO2 22.0 mmol/L      Calcium 9.0 mg/dL      eGFR Non African Amer 29 mL/min/1.73      BUN/Creatinine Ratio 19.6     Anion Gap 13.0 mmol/L     Narrative:      GFR Normal >60  Chronic Kidney Disease <60  Kidney Failure <15      Troponin [[id number]]  (Normal) Collected: 10/05/20 1513    Specimen: Blood Updated: 10/05/20 1543     Troponin T <0.010 ng/mL     Narrative:      Troponin T Reference Range:  <= 0.03 ng/mL-   Negative for AMI  >0.03 ng/mL-     Abnormal for myocardial necrosis.  Clinicians would have  to utilize clinical acumen, EKG, Troponin and serial changes to determine if it is an Acute Myocardial Infarction or myocardial injury due to an underlying chronic condition.       Results may be falsely decreased if patient taking Biotin.      BNP [[id number]]  (Normal) Collected: 10/05/20 1513    Specimen: Blood Updated: 10/05/20 1542     proBNP 246.0 pg/mL     Narrative:      Among patients with dyspnea, NT-proBNP is highly sensitive for the detection of acute congestive heart failure. In addition NT-proBNP of <300 pg/ml effectively rules out acute congestive heart failure with 99% negative predictive value.    Results may be falsely decreased if patient taking Biotin.      CBC & Differential [[id number]]  (Abnormal) Collected: 10/05/20 1513    Specimen: Blood Updated: 10/05/20 1522    Narrative:      The following orders were created for panel order CBC & Differential.  Procedure                               Abnormality         Status                     ---------                               -----------         ------                     CBC Auto Differential[[id number]]        Abnormal            Final result                 Please view results for these tests on the individual orders.    CBC Auto Differential [[id number]]  (Abnormal) Collected: 10/05/20 1513    Specimen: Blood Updated: 10/05/20 1522     WBC 11.70 10*3/mm3      RBC 4.31 10*6/mm3      Hemoglobin 11.6 g/dL      Hematocrit 36.8 %      MCV 85.4 fL      MCH 26.9 pg      MCHC 31.5 g/dL      RDW 15.5 %      RDW-SD 48.3 fl      MPV 9.2 fL      Platelets 285 10*3/mm3      Neutrophil % 63.2 %      Lymphocyte % 24.3 %      Monocyte % 6.4 %      Eosinophil % 4.5 %      Basophil % 0.9 %      Immature Grans % 0.7 %      Neutrophils, Absolute 7.40 10*3/mm3      Lymphocytes, Absolute 2.84 10*3/mm3      Monocytes, Absolute 0.75 10*3/mm3      Eosinophils, Absolute 0.53 10*3/mm3      Basophils, Absolute 0.10 10*3/mm3      Immature Grans, Absolute 0.08 10*3/mm3       nRBC 0.0 /100 WBC             I reviewed the patient's new clinical results.    Assessment/Plan:     Mrs. Slater is a 59 y/o F full code stable patient presenting with CP.     1. Chest Pain: Hx/o CABG '13, PAD, HLD, HTN, DMT2, MIKE, carotid stenting, recent cath July 14 2020 postponed due to ERIKA with Dr. Feliz  - Negative trop x3, EKG no acute, CXR no acute   - Received MANISHA in ED  - morphine and norflex improved pain  - consider cardiac consult and/or stress test    2. L Hip pain: Pain began 2 days ago, patient changed gait to compensate  - patient feels likely due to neck pain  - hip XR negative for acute process  - Pain reproducible over L femoral trochanter  - Likely trochanteric bursitis, consider topical antiinflam or steroid inj    3. RLE pain: no history of clots, moderate PAD  - biphasic from popliteal down bilaterally via SUNDEEP in 8/2020  - pain in RLE without erythema or edema  - likely claudication pain  - wells score -1    4. Neck Pain: radiating to chest  - Abated with morphine, norflex, tylenol from 8/10 to 6/10  - Likely MSK, needs PT    5. CKD3: baseline 1.7  - 1.78 this am, no criteria for ERIKA met    6. DMT2: poorly controlled OP  - stopped oral medications  - sliding scale novolog 0-9 units w/ meals 3x/day, levemir 95 units Q12    7. Anemia: 11.6 on admission  - 11.4 this am  - consider iron panel given normocytic     8. HTN: continue home medications  - lasix 40mg, lisinopril 10mg    9. HLD: continue home lipitor 40mg    10. Smoker: nicotine patch    11. COPD: continue home medications   - singular 10mg, albuterol PRN  - PRN O2 for sats <88    12. GERD: continue home medications  - protonix 40mg      DVT prophylaxis: Lovenox  Code status is   Code Status and Medical Interventions:   Ordered at: 10/05/20 1934     Code Status:    CPR     Medical Interventions (Level of Support Prior to Arrest):    Full       Plan for disposition:2-3 days      Time: 15 mins     Edgard Sorenson, MS4 - 0800am -  10/6/2020

## 2020-10-06 NOTE — PLAN OF CARE
Goal Outcome Evaluation:  Plan of Care Reviewed With: patient  Progress: no change     Pt not complaining of chest pain.  Pt complaining of lower back pain, shoulder, neck and knee pain.  Echo being done today  Continuing to monitor

## 2020-10-06 NOTE — CONSULTS
[facility] Consult    Referring Provider: Bladimir Morillo MD    Reason for Consultation: chest pain    Patient Care Team:  Nirmal Calvillo MD as PCP - General (Family Medicine)  Umesh Liz MD as PCP - Claims Attributed  Mone Us MD as Resident (Family Medicine)    Chief complaint   Chief Complaint   Patient presents with   • Chest Pain       Subjective .     History of present illness:     Ms. Slater is a 60 year old patient who presented to [facility] ER for neck pain radiating to her chest and shoulders. The pain is reproducible upon palpation. She does not complain of an exertional component. Her dyspnea that she has been seen for is stable and not worse.  She has prior history of CABG x3 in 2013. She is a patient of Dr. Barragan. She has been seen and worked up for dyspnea and chest pain. Her dyspnea was felt to be multifactorial as her RHC did not show evidence of PH or CHF. Her chest pain was to be evaluated by [facility], but her creatinine had ruddy to a level of 1.9 so the test was put off until seen by Nephrology and risks could be discussed.  She has done well since these tests in July. She does have evidence of PVD on ABIs, carotid disease, and history of CAD. She is on medical therapy for this. She is obese. She continues to smoke. She has severe MIKE with poor CPAP compliance. She established with [facility] for CHINO and PVD.   She did see Nephrology as an outpatient. Creatinine improved to 1.4.       Review of Systems  Review of Systems   Constitutional: Negative for diaphoresis, fatigue, fever and unexpected weight change.   Respiratory: Negative for cough, chest tightness, shortness of breath and wheezing.    Cardiovascular: Negative for chest pain, palpitations and leg swelling.   Gastrointestinal: Negative for abdominal distention and blood in stool.   Genitourinary: Negative for hematuria.   Musculoskeletal: Positive for neck pain. Negative for arthralgias and myalgias.   Skin: Negative  for pallor and rash.   Neurological: Negative for dizziness, syncope and weakness.   Hematological: Does not bruise/bleed easily.   Psychiatric/Behavioral: Negative for confusion. The patient is not nervous/anxious.        History  Past Medical History:   Diagnosis Date   • Anxiety states    • Asthma    • Carotid artery stenosis     DWIGHT 0-15%, LICA 0-15%. LICA stent 5/2014.   • Chronic folliculitis    • Chronic obstructive lung disease (CMS/HCC)    • Coronary arteriosclerosis    • Diabetes mellitus (CMS/[facility])     no retinopathy; A1C 9.1      • Dyslipidemia    • Essential hypertension    • Excoriated eczema     asteosis/keratosis   • GERD (gastroesophageal reflux disease)    • History of colon polyps    • Hypertensive disorder    • Kidney stone    • Mixed hyperlipidemia    • Morbid obesity due to excess calories (CMS/[facility])     BMI 44.5   • Neurologic disorder associated with diabetes mellitus (CMS/[facility])    • Non-healing surgical wound     LLE EVHa   • Peripheral vascular disease (CMS/[facility])    • Sleep apnea    • Tobacco dependence syndrome     1/2ppd      • Type 2 diabetes mellitus (CMS/[facility])    • Vitamin D deficiency    ,   Past Surgical History:   Procedure Laterality Date   • CARDIAC CATHETERIZATION  08/09/2013    Severe multivessel CAD with critical lesions noted in the RCA, obtuse marginal two, ramus intermemdious, and LAD as described above. Normal LV systolic function with no wall motion abnormality.    • CARDIAC CATHETERIZATION  05/27/2014     LEFT Carotid Stent    • CARDIAC CATHETERIZATION N/A 7/14/2020    Procedure: Right Heart Cath;  Surgeon: Eugenio Barragan MD PhD;  Location: [facility] CATH INVASIVE LOCATION;  Service: Cardiology;  Laterality: N/A;   • CAROTID STENT Left 05/27/2014   • COLONOSCOPY  05/02/2016    Normal colon.Diverticulosis in the sigmoid colon.No specimens collected.    • CORONARY ARTERY BYPASS GRAFT  11/15/2013    CABG x 3 with LIMA to LAD and coronary endarterectomy to LAD, SVG to Ramus  intermedius branch and SVG to PDA.    • CYSTOSCOPY URETEROSCOPY LASER LITHOTRIPSY     • ENDOSCOPY  09/23/2015     Esophageal stricture present.Normal stomach.Normal duodenum.    • ENDOSCOPY  11/16/2015    w/ dilatation - Esophageal stricture present,Dilatation performed.Normal stomach and duodenum.    • HYSTERECTOMY     • INCISION AND DRAINAGE ABSCESS  01/02/2016    Incision and drainage of right perineal abscess.    • INCISION AND DRAINAGE ABSCESS  02/18/2014    Incision and Drainage of abscess of left lower leg    • OTHER SURGICAL HISTORY  01/25/2016    DESTRUCTION OF BENIGN LESION      • OTHER SURGICAL HISTORY  04/18/2014    ear/neck - L attempted CEA, Neck Dissection    • TUBAL ABDOMINAL LIGATION     ,   Family History   Problem Relation Age of Onset   • Heart disease Mother    • Cancer Mother    • Heart disease Father    • Heart disease Sister    • Cancer Sister    • Heart disease Brother    ,   Social History     Tobacco Use   • Smoking status: Current Every Day Smoker     Packs/day: 0.25     Years: 40.00     Pack years: 10.00     Types: Cigarettes   • Smokeless tobacco: Never Used   Substance Use Topics   • Alcohol use: No   • Drug use: Yes     Types: LSD, Marijuana, Methamphetamines     Comment: Lasted used in 2006   ,   Medications Prior to Admission   Medication Sig Dispense Refill Last Dose   • albuterol (ACCUNEB) 0.63 MG/3ML nebulizer solution Take 3 mL by nebulization Every 6 (Six) Hours As Needed for Wheezing. 20 vial 0    • albuterol sulfate  (90 Base) MCG/ACT inhaler Inhale 2 puffs Every 4 (Four) Hours As Needed for Wheezing. 18 g 1 10/4/2020 at Unknown time   • atorvastatin (LIPITOR) 40 MG tablet Take 1 tablet by mouth Daily. 90 tablet 0 10/5/2020 at Unknown time   • betamethasone valerate (VALISONE) 0.1 % cream Apply  topically to the appropriate area as directed Daily. 45 g 2    • cetirizine (zyrTEC) 10 MG tablet Take 1 tablet by mouth Daily. 30 tablet 3 10/5/2020 at Unknown time   •  clopidogrel (PLAVIX) 75 MG tablet Take 1 tablet by mouth Daily. 30 tablet 5 10/5/2020 at Unknown time   • cyclobenzaprine (FLEXERIL) 10 MG tablet Take 1 tablet by mouth 2 (Two) Times a Day As Needed for Muscle Spasms. 60 tablet 5 10/5/2020 at Unknown time   • Empagliflozin (Jardiance) 10 MG tablet Take 10 mg by mouth Every Morning. 90 tablet 3 10/5/2020 at Unknown time   • ferrous sulfate (FeroSul) 325 (65 FE) MG tablet Take 1 tablet by mouth Daily With Breakfast. 30 tablet 3 10/5/2020 at Unknown time   • furosemide (LASIX) 40 MG tablet Take 1 tablet by mouth Daily. 30 tablet 3 Past Month at Unknown time   • gabapentin (NEURONTIN) 800 MG tablet Take 1 tablet by mouth 3 (Three) Times a Day. For neuropathy 90 tablet 0 10/5/2020 at Unknown time   • Insulin Glargine (Lantus SoloStar) 100 UNIT/ML injection pen Inject 95 Units under the skin into the appropriate area as directed Every 12 (Twelve) Hours. 60 mL 11 10/5/2020 at Unknown time   • lidocaine (LIDODERM) 5 % Place 1 patch on the skin as directed by provider Daily. Remove & Discard patch within 12 hours or as directed by MD 15 each 0    • lidocaine (XYLOCAINE) 5 % ointment Apply  topically to the appropriate area as directed Every 2 (Two) Hours As Needed for Mild Pain . 1 tube 0    • Liraglutide (Victoza) 18 MG/3ML solution pen-injector injection Inject 1.2 mg under the skin into the appropriate area as directed Daily. 3 mL 1 10/5/2020 at Unknown time   • lisinopril (PRINIVIL,ZESTRIL) 10 MG tablet Take 1 tablet by mouth Daily. 30 tablet 5 10/5/2020 at Unknown time   • metoprolol tartrate (LOPRESSOR) 50 MG tablet Take 1 tablet by mouth Every 12 (Twelve) Hours. 60 tablet 5 10/5/2020 at Unknown time   • montelukast (SINGULAIR) 10 MG tablet Take 1 tablet by mouth Every Night. 30 tablet 5 10/4/2020 at Unknown time   • nitroglycerin (NITROSTAT) 0.4 MG SL tablet Place 1 tablet under the tongue As Needed for Chest Pain. Take no more than 3 doses in 15 minutes. 30 tablet 3     • nystatin (MYCOSTATIN) 432654 UNIT/GM powder Apply  topically to the appropriate area as directed 3 (Three) Times a Day. 15 g 1    • ondansetron ODT (Zofran ODT) 4 MG disintegrating tablet Place 1 tablet on the tongue Every 12 (Twelve) Hours As Needed for Nausea or Vomiting. 30 tablet 0    • oxybutynin XL (DITROPAN-XL) 5 MG 24 hr tablet    10/5/2020 at Unknown time   • pantoprazole (PROTONIX) 40 MG EC tablet Take 1 tablet by mouth Daily. (Patient taking differently: Take 40 mg by mouth Every Night.) 30 tablet 6 10/4/2020 at Unknown time   • potassium chloride (MICRO-K) 10 MEQ CR capsule Take 1 capsule by mouth Daily. 30 capsule 5    • promethazine (PHENERGAN) 25 MG tablet Take 1 tablet by mouth Every 6 (Six) Hours As Needed for Nausea or Vomiting. 30 tablet 0    • sodium bicarbonate 325 MG tablet Take 325 mg by mouth 2 (Two) Times a Day.   10/5/2020 at Unknown time   • aspirin (aspirin) 81 MG EC tablet Take 1 tablet by mouth Daily. 31 tablet 6    • Blood Glucose Monitoring Suppl ([employer] BLOOD GLUCOSE METER) w/Device kit 1 each 3 (Three) Times a Day. 1 kit 3    • EASY TOUCH PEN NEEDLES 31G X 8 MM misc       • glucose blood test strip Use as instructed 100 each 12    • Insulin Pen Needle (NovoFine) 30G X 8 MM misc As directed 4 times daily 100 each 11    • ipratropium-albuterol (DUO-NEB) 0.5-2.5 mg/3 ml nebulizer Take 3 mL by nebulization 4 (Four) Times a Day. ICD10 code J96.01 360 mL 0 More than a month at Unknown time   • metFORMIN (GLUCOPHAGE) 1000 MG tablet Take 1 tablet by mouth 2 (Two) Times a Day With Meals. 60 tablet 5    • naproxen (NAPROSYN) 500 MG tablet Take 1 tablet by mouth 2 (Two) Times a Day With Meals. 60 tablet 3    • nicotine (Nicoderm CQ) 21 MG/24HR patch Place 1 patch on the skin as directed by provider Daily. 42 patch 0    • SPS 15 GM/60ML suspension          ALLERGIES  Adhesive tape and Other    Objective     Vital Sign Min/Max for last 24 hours  Temp  Min: 96.1 °F (35.6 °C)  Max: 98.2 °F (36.8  "°C)   BP  Min: 131/73  Max: 168/76   Pulse  Min: 78  Max: 92   Resp  Min: 18  Max: 22   SpO2  Min: 92 %  Max: 97 %   No data recorded   Weight  Min: 126 kg (277 lb 6.4 oz)  Max: 129 kg (284 lb)     Flowsheet Rows      First Filed Value   Admission Height  157.5 cm (62\") Documented at 10/05/2020 1453   Admission Weight  128 kg (282 lb) Documented at 10/05/2020 1453             Physical Exam:  Physical Exam  Vitals signs and nursing note reviewed.   Constitutional:       General: She is not in acute distress.     Appearance: She is well-developed. She is not diaphoretic.   HENT:      Head: Normocephalic.   Eyes:      Conjunctiva/sclera: Conjunctivae normal.   Neck:      Vascular: No JVD.   Cardiovascular:      Rate and Rhythm: Normal rate and regular rhythm.      Heart sounds: Normal heart sounds, S1 normal and S2 normal. No murmur. No friction rub. No gallop.    Pulmonary:      Effort: Pulmonary effort is normal. No respiratory distress.      Breath sounds: Normal breath sounds. No wheezing or rales.   Abdominal:      General: Bowel sounds are normal. There is no distension.      Palpations: Abdomen is soft.   Musculoskeletal: Normal range of motion.   Skin:     General: Skin is warm and dry.      Findings: No erythema.   Neurological:      Mental Status: She is alert and oriented to person, place, and time.   Psychiatric:         Behavior: Behavior normal.         Thought Content: Thought content normal.         Judgment: Judgment normal.            Results Review:     Results from last 7 days   Lab Units 10/06/20  0544 10/05/20  1513   SODIUM mmol/L 138 135*   POTASSIUM mmol/L 3.5 4.3   CHLORIDE mmol/L 103 100   CO2 mmol/L 24.0 22.0   BUN mg/dL 34* 35*   CREATININE mg/dL 1.78* 1.79*   CALCIUM mg/dL 9.2 9.0   GLUCOSE mg/dL 155* 265*       Estimated Creatinine Clearance: 42.2 mL/min (A) (by C-G formula based on SCr of 1.78 mg/dL (H)).          Results from last 7 days   Lab Units 10/06/20  0544 10/05/20  1513   WBC " 10*3/mm3 10.09 11.70*   HEMOGLOBIN g/dL 11.4* 11.6*   PLATELETS 10*3/mm3 265 285             Lab Results   Component Value Date    CKTOTAL 49 08/26/2017    CKMB 0.93 08/26/2017    TROPONINI <0.012 10/28/2018    TROPONINT <0.010 10/05/2020     Lab Results   Component Value Date    PROBNP 246.0 10/05/2020       I/O last 3 completed shifts:  In: 1240 [P.O.:240; IV Piggyback:1000]  Out: -     Cardiographics      ECG/EMG Results (last 24 hours)     Procedure Component Value Units Date/Time    ECG 12 Lead [[id number]] Collected: 10/05/20 1459     Updated: 10/05/20 1705          Results for orders placed during the hospital encounter of 04/16/17   Adult Transthoracic Echo Complete    Narrative · Left ventricular function is normal.  · The study is technically difficult for diagnosis  · Estimated EF appears to be in the range of 56 - 60%.  · Left ventricular diastolic function is normal.          Xr Chest 2 View    Result Date: 10/5/2020  Cardiomegaly. Midline sternal sutures from prior cardiac surgery. Pulmonary vascular prominence. Otherwise unremarkable chest. Electronically signed by:  Hugo Russo MD  10/5/2020 3:56 PM CDT Workstation: [id number]    Xr Hip With Or Without Pelvis 2 - 3 View Left    Result Date: 10/5/2020  No acute abnormality. Electronically signed by:  Eusebio Sargent  10/5/2020 10:22 PM CDT Workstation: [id number]      Intake/Output       10/05/20 0700 - 10/06/20 0659    Intake (ml) 1240    Output (ml) --    Net (ml) 1240    Last Weight  126 kg (277 lb 6.4 oz)          Assessment/Plan       Chest pain  Chest pain syndrome. Pain characteristic: atypical angina   Patient is High Risk with prior CAD.     Ischemic evaluation:not at this time.   The patient is a poor candidate for nuclear testing and unable to exercise.   EKG is Abnormal and unchanged from 2018  Troponin is normal x 4.  Risks/Benefits discussed  Ischemia evaluation with: echo to rule out RWMA  - her prior films were reviewed by Dr. Feliz.  She had a small circumflex that was too small for intervention or bypass back in 2013. Unable to determine is grafts are patent, but there are no high risk features on EKG or troponing.   Will complete echo prior to making decision on invasive testing, but it appears medical management is the most appropriate plan at this time.     - add ASA 81mg  - Plavix 75mg PO daily  - Metoprolol 50mg BID (increase to 100mg)  - Add Imdur 30mg daily  - keep Lisinopril 10mg dose due to CKD III  - LDL controlled on current statin dose  - last A1c controlled at 7.6  - smoking cessation encouraged        Disposition: continue current management for neck discomfort. Will add Imdur and increase BB to improve BP and HR. If chest pain happens with exertion or high risk features develop, will contact nephrology for team based approached for left heart cath.     I discussed the patient's findings and my recommendations with patient and Dr. Feliz            This document has been electronically signed by BRIDGETT Waters on October 6, 2020 08:48 CDT
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
## 2020-10-06 NOTE — ACP (ADVANCE CARE PLANNING)
The patient wishes to be full code and for her daughter, Yamileth Chavez, to make decisions for her in the event that she is unable to. Her contact is [phone].          This document has been electronically signed by Nirmal Calvillo MD on October 5, 2020 21:00 CDT

## 2020-10-06 NOTE — PLAN OF CARE
Goal Outcome Evaluation:  Hip Xray complete, Chest, neck and shoulder pain controlled with IV Morphine. Will continue to monitor.

## 2020-10-06 NOTE — DISCHARGE SUMMARY
DISCHARGE SUMMARY    PATIENT NAME: Yamileth Slater       PHYSICIAN: Nirmal Calvillo MD  : 1960  MRN: [id number]    ADMITTED: 10/5/2020     DISCHARGED: 10/06/20    ADMISSION DIAGNOSES:  Active Hospital Problems    Diagnosis  POA   • **Chest pain [R07.9]  Yes   • Left hip pain [M25.552]  Yes   • Neck pain [M54.2]  Yes   • Stage 3 chronic kidney disease [N18.30]  Yes   • DM type 2 with diabetic peripheral neuropathy (CMS/HCC) [E11.42]  Yes   • Anemia [D64.9]  Yes   • Chronic obstructive pulmonary disease (CMS/HCC) [J44.9]  Yes   • Dyslipidemia [E78.5]  Yes   • Essential hypertension [I10]  Yes   • GERD (gastroesophageal reflux disease) [K21.9]  Yes   • MIKE on CPAP [G47.33, Z99.89]  Not Applicable   • Carotid artery disease (CMS/HCC) [I77.9]  Yes   • Current smoker [F17.200]  Yes   • PAD (peripheral artery disease) (CMS/HCC) [I73.9]  Yes      Resolved Hospital Problems   No resolved problems to display.     DISCHARGE DIAGNOSES:   Active Hospital Problems    Diagnosis  POA   • **Chest pain [R07.9]  Yes   • Left hip pain [M25.552]  Yes   • Neck pain [M54.2]  Yes   • Stage 3 chronic kidney disease [N18.30]  Yes   • DM type 2 with diabetic peripheral neuropathy (CMS/HCC) [E11.42]  Yes   • Anemia [D64.9]  Yes   • Chronic obstructive pulmonary disease (CMS/HCC) [J44.9]  Yes   • Dyslipidemia [E78.5]  Yes   • Essential hypertension [I10]  Yes   • GERD (gastroesophageal reflux disease) [K21.9]  Yes   • MIKE on CPAP [G47.33, Z99.89]  Not Applicable   • Carotid artery disease (CMS/HCC) [I77.9]  Yes   • Current smoker [F17.200]  Yes   • PAD (peripheral artery disease) (CMS/HCC) [I73.9]  Yes      Resolved Hospital Problems   No resolved problems to display.       SERVICE: Family Medicine Res[facility]  Attending: Pauline Martinez MD  Resident: Nirmal Calvillo MD    CONSULTS:   Consult Orders (all) (From admission, onward)     Start     Ordered    10/06/20 0812  Inpatient Cardiology Consult  Once     Specialty:   Cardiology  Provider:  Maryam Feliz MD    10/06/20 0813                PROCEDURES:   None    HISTORY OF PRESENT ILLNESS:   HPI from Dr. Calvillo's H&P on admission on 10/5/20    Yamileth Slater is a 60 y.o. female with a CM[facility] of CABG in 2013, PAD, Carotid stenting, diabetes, hypertension, CKD III, MIKE, COPD, and hyperlipidemia who presents complaining of  chest pain for one day.  Says that she started having a stiff neck 5 days ago and the pain was in her ears which radiated to her neck and continue to her shoulders and then the day prior to admission 1 into her chest and in between her shoulder blades.  Says the pain is sharp, worsened with movement, and alleviated with not moving.  She says she has never felt this pain before.  It is intermittent without any associated nausea, shortness of breath, or diaphoresis.  She denies sleeping in any awkward positions.     In addition, she says that her left hip catches so she has to walk without a limp and is unsure if she is compensating for her neck or her back pain.  This started 2 days ago she denies any trauma to her back.    DIAGNOSTIC DATA:   Lab Results (last 24 hours)     Procedure Component Value Units Date/Time    POC Glucose Once [[id number]]  (Abnormal) Collected: 10/06/20 1648    Specimen: Blood Updated: 10/06/20 1706     Glucose 368 mg/dL     Ferritin [[id number]]  (Normal) Collected: 10/06/20 0544    Specimen: Blood Updated: 10/06/20 1355     Ferritin 48.82 ng/mL     Narrative:      Results may be falsely decreased if patient taking Biotin.      POC Glucose Once [[id number]]  (Normal) Collected: 10/06/20 1059    Specimen: Blood Updated: 10/06/20 1135     Glucose 126 mg/dL     Iron Profile [[id number]]  (Abnormal) Collected: 10/06/20 0544    Specimen: Blood Updated: 10/06/20 0911     Iron 31 mcg/dL      Iron Saturation 9 %      Transferrin 226 mg/dL      TIBC 337 mcg/dL     POC Glucose Once [[id number]]  (Abnormal) Collected: 10/06/20 0629    Specimen:  Blood Updated: 10/06/20 0655     Glucose 163 mg/dL     Basic Metabolic Panel [[id number]]  (Abnormal) Collected: 10/06/20 0544    Specimen: Blood Updated: 10/06/20 0620     Glucose 155 mg/dL      BUN 34 mg/dL      Creatinine 1.78 mg/dL      Sodium 138 mmol/L      Potassium 3.5 mmol/L      Chloride 103 mmol/L      CO2 24.0 mmol/L      Calcium 9.2 mg/dL      eGFR Non African Amer 29 mL/min/1.73      BUN/Creatinine Ratio 19.1     Anion Gap 11.0 mmol/L     Narrative:      GFR Normal >60  Chronic Kidney Disease <60  Kidney Failure <15      CBC & Differential [[id number]]  (Abnormal) Collected: 10/06/20 0544    Specimen: Blood Updated: 10/06/20 0606    Narrative:      The following orders were created for panel order CBC & Differential.  Procedure                               Abnormality         Status                     ---------                               -----------         ------                     CBC Auto Differential[[id number]]        Abnormal            Final result                 Please view results for these tests on the individual orders.    CBC Auto Differential [[id number]]  (Abnormal) Collected: 10/06/20 0544    Specimen: Blood Updated: 10/06/20 0606     WBC 10.09 10*3/mm3      RBC 4.24 10*6/mm3      Hemoglobin 11.4 g/dL      Hematocrit 35.9 %      MCV 84.7 fL      MCH 26.9 pg      MCHC 31.8 g/dL      RDW 15.6 %      RDW-SD 48.0 fl      MPV 9.3 fL      Platelets 265 10*3/mm3      Neutrophil % 57.7 %      Lymphocyte % 28.4 %      Monocyte % 6.2 %      Eosinophil % 6.2 %      Basophil % 0.9 %      Immature Grans % 0.6 %      Neutrophils, Absolute 5.81 10*3/mm3      Lymphocytes, Absolute 2.87 10*3/mm3      Monocytes, Absolute 0.63 10*3/mm3      Eosinophils, Absolute 0.63 10*3/mm3      Basophils, Absolute 0.09 10*3/mm3      Immature Grans, Absolute 0.06 10*3/mm3      nRBC 0.0 /100 WBC     Troponin [[id number]]  (Normal) Collected: 10/05/20 2257    Specimen: Blood Updated: 10/05/20 2330     Troponin T <0.010  ng/mL     Narrative:      Troponin T Reference Range:  <= 0.03 ng/mL-   Negative for AMI  >0.03 ng/mL-     Abnormal for myocardial necrosis.  Clinicians would have to utilize clinical acumen, EKG, Troponin and serial changes to determine if it is an Acute Myocardial Infarction or myocardial injury due to an underlying chronic condition.       Results may be falsely decreased if patient taking Biotin.      COVID PRE-OP / PRE-PROCEDURE SCREENING ORDER (NO ISOLATION) - Swab, Nasopharynx [[id number]]  (Normal) Collected: 10/05/20 1853    Specimen: Swab from Nasopharynx Updated: 10/05/20 2306    Narrative:      The following orders were created for panel order COVID PRE-OP / PRE-PROCEDURE SCREENING ORDER (NO ISOLATION) - Swab, Nasopharynx.  Procedure                               Abnormality         Status                     ---------                               -----------         ------                     COVID-19, [facility] IN-HOUS...[[id number]]  Normal              Final result                 Please view results for these tests on the individual orders.    COVID-19, [facility] IN-HOUSE, NP SWAB IN TRANSPORT MEDIA 8-10 HR TAT - Swab, Nasopharynx [[id number]]  (Normal) Collected: 10/05/20 1853    Specimen: Swab from Nasopharynx Updated: 10/05/20 2306     COVID19 Not Detected    Narrative:      Testing performed by Real Time RT-PCR  This test has not been approved by the [facility] but is authorized under the Emergency Use Act (EUA)    Fact sheet for providers: https://www.fda.gov/media/371296/download    Fact sheet for patients: https://www.fda.gov/media/130619/download        Troponin [[id number]]  (Normal) Collected: 10/05/20 2004    Specimen: Blood Updated: 10/05/20 2025     Troponin T <0.010 ng/mL     Narrative:      Troponin T Reference Range:  <= 0.03 ng/mL-   Negative for AMI  >0.03 ng/mL-     Abnormal for myocardial necrosis.  Clinicians would have to utilize clinical acumen, EKG, Troponin and serial changes to determine  if it is an Acute Myocardial Infarction or myocardial injury due to an underlying chronic condition.       Results may be falsely decreased if patient taking Biotin.          Imaging Results (Last 24 Hours)     Procedure Component Value Units Date/Time    XR Hip With or Without Pelvis 2 - 3 View Left [[id number]] Collected: 10/05/20 2106     Updated: 10/05/20 2223    Narrative:      Pelvis and left hip x-ray three views on 10/5/2020    CLINICAL INDICATION: Left hip pain    COMPARISON: 8/11/2018    FINDINGS: Vascular calcifications are noted. The hips are well  located. The SI joints are well aligned. Degenerative changes are  noted in the lower lumbar spine. There are no fractures. No  significant degenerative changes are noted in the hips.      Impression:      No acute abnormality.    Electronically signed by:  Eusebio Sargent  10/5/2020 10:22 PM  CDT Workstation: [id number]        Results for orders placed during the hospital encounter of 10/05/20   Adult Transthoracic Echo Complete W/ Cont if Necessary Per Protocol    Narrative · Left ventricular ejection fraction appears to be greater than 70%. Left   ventricular systolic function is hyperdynamic (EF > 70%).  · Left ventricular wall thickness is consistent with moderate concentric   hypertrophy.  · Left ventricular diastolic function is consistent with (grade Ia w/high   LAP) impaired relaxation.  · Mild mitral annular calcification is present.  · Trace mitral valve regurgitation is present.            HOSPITAL COURSE:  Patient was admitted for chest pain and due to history significant for CABG, PAD, carotid stenting and postponed cardiac catheterization in summer 2020 cardiology was consulted who recommended increasing metoprolol to 100mg BID, starting Imdur 30mg daily, echo, and outpatient follow up. Necessity for catheterization will be evaluated if patient experiences typical chest pain or has objective signs of ischemia. Her chest pain, and neck pain  improved, workup was negative for any acute ischemia, she was tolerating diet, and ambulating well so was deemed safe for discharge with new medication regimen and appropriate follow up with cardiology, and PCP.     DISCHARGE CONDITION:   Stable    [place] [facility] [place] MEDICATIONS     Discharge Medications      New Medications      Instructions Start Date   isosorbide mononitrate 30 MG 24 hr tablet  Commonly known as: IMDUR   30 mg, Oral, Every 24 Hours Scheduled   Start Date: October 7, 2020        Changes to Medications      Instructions Start Date   metoprolol tartrate 100 MG tablet  Commonly known as: LOPRESSOR  What changed:   · medication strength  · how much to take   100 mg, Oral, Every 12 Hours Scheduled      pantoprazole 40 MG EC tablet  Commonly known as: PROTONIX  What changed: when to take this   40 mg, Oral, Daily         Continue These Medications      Instructions Start Date   albuterol 0.63 MG/3ML nebulizer solution  Commonly known as: ACCUNEB   0.63 mg, Nebulization, Every 6 Hours PRN      albuterol sulfate  (90 Base) MCG/ACT inhaler  Commonly known as: PROVENTIL HFA;VENTOLIN HFA;PROAIR HFA   2 puffs, Inhalation, Every 4 Hours PRN      aspirin 81 MG EC tablet   81 mg, Oral, Daily      atorvastatin 40 MG tablet  Commonly known as: LIPITOR   40 mg, Oral, Daily      betamethasone valerate 0.1 % cream  Commonly known as: VALISONE   Topical, Daily      cetirizine 10 MG tablet  Commonly known as: zyrTEC   10 mg, Oral, Daily      clopidogrel 75 MG tablet  Commonly known as: PLAVIX   75 mg, Oral, Daily      [employer] Blood Glucose Meter w/Device kit  Notes to patient: As directed   1 each, Does not apply, 3 Times Daily      cyclobenzaprine 10 MG tablet  Commonly known as: FLEXERIL   10 mg, Oral, 2 Times Daily PRN      Empagliflozin 10 MG tablet  Commonly known as: Jardiance   10 mg, Oral, Every Morning      ferrous sulfate 325 (65 FE) MG tablet  Commonly known as: FeroSul   325  mg, Oral, Daily With Breakfast      furosemide 40 MG tablet  Commonly known as: LASIX   40 mg, Oral, Daily      gabapentin 800 MG tablet  Commonly known as: NEURONTIN   800 mg, Oral, 3 Times Daily, For neuropathy      glucose blood test strip  Notes to patient: As directed   Use as instructed      Insulin Glargine 100 UNIT/ML injection pen  Commonly known as: Lantus SoloStar   95 Units, Subcutaneous, Every 12 Hours      Insulin Pen Needle 30G X 8 MM misc  Commonly known as: NovoFine  Notes to patient: As directed   As directed 4 times daily      Easy Touch Pen Needles 31G X 8 MM misc  Generic drug: Insulin Pen Needle  Notes to patient: As directed   No dose, route, or frequency recorded.      ipratropium-albuterol 0.5-2.5 mg/3 ml nebulizer  Commonly known as: DUO-NEB   3 mL, Nebulization, 4 Times Daily, ICD10 code J96.01      lidocaine 5 %  Commonly known as: LIDODERM   1 patch, Transdermal, Every 24 Hours Scheduled, Remove & Discard patch within 12 hours or as directed by MD      lidocaine 5 % ointment  Commonly known as: XYLOCAINE   Topical, Every 2 Hours PRN      Liraglutide 18 MG/3ML solution pen-injector injection  Commonly known as: Victoza   1.2 mg, Subcutaneous, Daily      lisinopril 10 MG tablet  Commonly known as: PRINIVIL,ZESTRIL   10 mg, Oral, Daily      metFORMIN 1000 MG tablet  Commonly known as: GLUCOPHAGE   1,000 mg, Oral, 2 Times Daily With Meals      montelukast 10 MG tablet  Commonly known as: SINGULAIR   10 mg, Oral, Nightly      naproxen 500 MG tablet  Commonly known as: NAPROSYN   500 mg, Oral, 2 Times Daily With Meals      nicotine 21 MG/24HR patch  Commonly known as: Nicoderm CQ   1 patch, Transdermal, Every 24 Hours      nitroglycerin 0.4 MG SL tablet  Commonly known as: NITROSTAT   0.4 mg, Sublingual, As Needed, Take no more than 3 doses in 15 minutes.       nystatin [id number] UNIT/GM powder  Commonly known as: MYCOSTATIN   Topical, 3 Times Daily      ondansetron ODT 4 MG disintegrating  tablet  Commonly known as: Zofran ODT   4 mg, Translingual, Every 12 Hours PRN      oxybutynin XL 5 MG 24 hr tablet  Commonly known as: DITROPAN-XL   No dose, route, or frequency recorded.      potassium chloride 10 MEQ CR capsule  Commonly known as: MICRO-K   10 mEq, Oral, Daily      promethazine 25 MG tablet  Commonly known as: PHENERGAN   25 mg, Oral, Every 6 Hours PRN      sodium bicarbonate 325 MG tablet   325 mg, Oral, 2 Times Daily      SPS 15 GM/60ML suspension  Generic drug: sodium polystyrene   No dose, route, or frequency recorded.             INSTRUCTIONS:  Activity:   Activity Instructions     Activity as Tolerated          Diet:   Diet Instructions     Diet: Consistent Carbohydrate, Cardiac, Renal      Discharge Diet:  Consistent Carbohydrate  Cardiac  Renal             FOLLOW UP:   Additional Instructions for the Follow-ups that You Need to Schedule     Ambulatory Referral to Home Health   As directed      Face to Face Visit Date: 10/6/2020    Follow-up provider for Plan of Care?: I treated the patient in an acute care facility and will not continue treatment after discharge.    Follow-up provider: NIRMAL LOPEZ [[id number]]    Reason/Clinical Findings: Hospital Discharge    Describe mobility limitations that make leaving home difficult: transition visit    Nursing/Therapeutic Services Requested: Other (transition visit)    Frequency: 1 Week 1         Discharge Follow-up with PCP   As directed       Currently Documented PCP:    Nirmal Lopez MD    PCP Phone Number:    [phone]     Follow Up Details: Follow up with PCP in 1 week         Discharge Follow-up with Specialty: Follow up with Dr. Barragan (cardiology) in 1 week; 1 Week   As directed      Specialty: Follow up with Dr. Barragan (cardiology) in 1 week    Follow Up: 1 Week            Contact information for follow-up providers     Nirmal Lopez MD. Go on 10/9/2020.    Specialties: Family Medicine, Emergency Medicine  Why:  FRIDAY OCTOBER 9TH 9:45 HOSPITAL FOLLOW UP APPOINTMENT  Contact information:  [address] [place] [address]  [phone]             [facility] HOME CARE REFERRAL .    Specialty: Home Health Services  Contact information:  [address] [place] [address]                 Contact information for after-discharge care     Home Medical Care     [facility] .    Service: Home Health Services  Contact information:  [address] [place] [address]  [phone]                             PENDING TEST RESULTS AT DISCHARGE      Time: >30 minutes was spent in discharge planning, medication reconciliation and coordination of care for this patient.    Pauline Martinez MD is the attending at time of discharge, She is aware of the patient's status and agrees with the above discharge summary.          This document has been electronically signed by Nirmal Calvillo MD on October 6, 2020 19:23 CDT

## 2020-10-06 NOTE — H&P
HISTORY AND PHYSICAL  NAME: Yamileth Slater  : 1960  MRN: [id number]    DATE OF ADMISSION:  10/5/2020     DATE & TIME SEEN: 10/05/20 at 1745    PCP: Nirmal Calvillo MD    CODE STATUS: Full code    CHIEF COMPLAINT:  Chest pain    HPI:  Yamileth Slater is a 60 y.o. female with a CMH of CABG in 2013, PAD, Carotid stenting, diabetes, hypertension, CKD III, MIKE, COPD, and hyperlipidemia who presents complaining of  chest pain for one day.  Says that she started having a stiff neck 5 days ago and the pain was in her ears which radiated to her neck and continue to her shoulders and then the day prior to admission 1 into her chest and in between her shoulder blades.  Says the pain is sharp, worsened with movement, and alleviated with not moving.  She says she has never felt this pain before.  It is intermittent without any associated nausea, shortness of breath, or diaphoresis.  She denies sleeping in any awkward positions.    In addition, she says that her left hip catches so she has to walk without a limp and is unsure if she is compensating for her neck or her back pain.  This started 2 days ago she denies any trauma to her back.    CONCURRENT MEDICAL HISTORY:  Past Medical History:   Diagnosis Date   • Anxiety states    • Asthma    • Carotid artery stenosis     DWIGHT 0-15%, LICA 0-15%. LICA stent 2014.   • Chronic folliculitis    • Chronic obstructive lung disease (CMS/HCC)    • Coronary arteriosclerosis    • Diabetes mellitus (CMS/HCC)     no retinopathy; A1C 9.1      • Dyslipidemia    • Essential hypertension    • Excoriated eczema     asteosis/keratosis   • GERD (gastroesophageal reflux disease)    • History of colon polyps    • Hypertensive disorder    • Kidney stone    • Mixed hyperlipidemia    • Morbid obesity due to excess calories (CMS/HCC)     BMI 44.5   • Neurologic disorder associated with diabetes mellitus (CMS/HCC)    • Non-healing surgical wound     LLE EVHa   • Peripheral  vascular disease (CMS/[facility])    • Sleep apnea    • Tobacco dependence syndrome     1/2ppd      • Type 2 diabetes mellitus (CMS/HCC)    • Vitamin D deficiency        PAST SURGICAL HISTORY:  Past Surgical History:   Procedure Laterality Date   • CARDIAC CATHETERIZATION  08/09/2013    Severe multivessel CAD with critical lesions noted in the RCA, obtuse marginal two, ramus intermemdious, and LAD as described above. Normal LV systolic function with no wall motion abnormality.    • CARDIAC CATHETERIZATION  05/27/2014     LEFT Carotid Stent    • CARDIAC CATHETERIZATION N/A 7/14/2020    Procedure: Right Heart Cath;  Surgeon: Eugenio Barragan MD PhD;  Location: [facility] CATH INVASIVE LOCATION;  Service: Cardiology;  Laterality: N/A;   • CAROTID STENT Left 05/27/2014   • COLONOSCOPY  05/02/2016    Normal colon.Diverticulosis in the sigmoid colon.No specimens collected.    • CORONARY ARTERY BYPASS GRAFT  11/15/2013    CABG x 3 with LIMA to LAD and coronary endarterectomy to LAD, SVG to Ramus intermedius branch and SVG to PDA.    • CYSTOSCOPY URETEROSCOPY LASER LITHOTRIPSY     • ENDOSCOPY  09/23/2015     Esophageal stricture present.Normal stomach.Normal duodenum.    • ENDOSCOPY  11/16/2015    w/ dilatation - Esophageal stricture present,Dilatation performed.Normal stomach and duodenum.    • HYSTERECTOMY     • INCISION AND DRAINAGE ABSCESS  01/02/2016    Incision and drainage of right perineal abscess.    • INCISION AND DRAINAGE ABSCESS  02/18/2014    Incision and Drainage of abscess of left lower leg    • OTHER SURGICAL HISTORY  01/25/2016    DESTRUCTION OF BENIGN LESION      • OTHER SURGICAL HISTORY  04/18/2014    ear/neck - L attempted CEA, Neck Dissection    • TUBAL ABDOMINAL LIGATION         FAMILY HISTORY:  Family History   Problem Relation Age of Onset   • Heart disease Mother    • Cancer Mother    • Heart disease Father    • Heart disease Sister    • Cancer Sister    • Heart disease Brother         SOCIAL  HISTORY:  Social History     Socioeconomic History   • Marital status:      Spouse name: wesley dumont   • Number of children: Not on file   • Years of education: Not on file   • Highest education level: Not on file   Tobacco Use   • Smoking status: Current Every Day Smoker     Packs/day: 0.25     Years: 40.00     Pack years: 10.00     Types: Cigarettes   • Smokeless tobacco: Never Used   Substance and Sexual Activity   • Alcohol use: No   • Drug use: Yes     Types: LSD, Marijuana, Methamphetamines     Comment: Lasted used in 2006   • Sexual activity: Not Currently     Partners: Male       HOME MEDICATIONS:  Prior to Admission medications    Medication Sig Start Date End Date Taking? Authorizing Provider   albuterol (ACCUNEB) 0.63 MG/3ML nebulizer solution Take 3 mL by nebulization Every 6 (Six) Hours As Needed for Wheezing. 9/2/20  Yes Nirmal Calvillo MD   albuterol sulfate  (90 Base) MCG/ACT inhaler Inhale 2 puffs Every 4 (Four) Hours As Needed for Wheezing. 9/2/20  Yes Nirmal Calvillo MD   atorvastatin (LIPITOR) 40 MG tablet Take 1 tablet by mouth Daily. 9/2/20  Yes Nirmal Calvillo MD   betamethasone valerate (VALISONE) 0.1 % cream Apply  topically to the appropriate area as directed Daily. 8/6/20  Yes Nirmal Calvillo MD   cetirizine (zyrTEC) 10 MG tablet Take 1 tablet by mouth Daily. 9/2/20  Yes Nirmal Calvillo MD   clopidogrel (PLAVIX) 75 MG tablet Take 1 tablet by mouth Daily. 9/2/20  Yes Nirmal Calvillo MD   cyclobenzaprine (FLEXERIL) 10 MG tablet Take 1 tablet by mouth 2 (Two) Times a Day As Needed for Muscle Spasms. 9/2/20  Yes Nirmal Calvillo MD   Empagliflozin (Jardiance) 10 MG tablet Take 10 mg by mouth Every Morning. 9/2/20  Yes Nirmal Calvillo MD   ferrous sulfate (FeroSul) 325 (65 FE) MG tablet Take 1 tablet by mouth Daily With Breakfast. 9/2/20  Yes Nirmal Calvillo MD   furosemide (LASIX) 40 MG tablet Take 1 tablet by mouth Daily. 9/2/20  Yes  Nirmal Calvillo MD   gabapentin (NEURONTIN) 800 MG tablet Take 1 tablet by mouth 3 (Three) Times a Day. For neuropathy 9/11/20  Yes Nirmal Calvillo MD   Insulin Glargine (Lantus SoloStar) 100 UNIT/ML injection pen Inject 95 Units under the skin into the appropriate area as directed Every 12 (Twelve) Hours. 9/2/20  Yes Nirmal Calvillo MD   lidocaine (LIDODERM) 5 % Place 1 patch on the skin as directed by provider Daily. Remove & Discard patch within 12 hours or as directed by MD 8/6/20  Yes Nirmal Calvillo MD   lidocaine (XYLOCAINE) 5 % ointment Apply  topically to the appropriate area as directed Every 2 (Two) Hours As Needed for Mild Pain . 8/6/20  Yes Nirmal Calvillo MD   Liraglutide (Victoza) 18 MG/3ML solution pen-injector injection Inject 1.2 mg under the skin into the appropriate area as directed Daily. 8/6/20  Yes Nirmal Calvillo MD   lisinopril (PRINIVIL,ZESTRIL) 10 MG tablet Take 1 tablet by mouth Daily. 9/2/20  Yes Nirmal Calvillo MD   metoprolol tartrate (LOPRESSOR) 50 MG tablet Take 1 tablet by mouth Every 12 (Twelve) Hours. 9/2/20  Yes Nirmal Calvillo MD   montelukast (SINGULAIR) 10 MG tablet Take 1 tablet by mouth Every Night. 9/2/20  Yes Nirmal Calvillo MD   nitroglycerin (NITROSTAT) 0.4 MG SL tablet Place 1 tablet under the tongue As Needed for Chest Pain. Take no more than 3 doses in 15 minutes. 8/6/20  Yes Nirmal Calvillo MD   nystatin (MYCOSTATIN) 195013 UNIT/GM powder Apply  topically to the appropriate area as directed 3 (Three) Times a Day. 8/6/20  Yes Nirmal Calvillo MD   ondansetron ODT (Zofran ODT) 4 MG disintegrating tablet Place 1 tablet on the tongue Every 12 (Twelve) Hours As Needed for Nausea or Vomiting. 8/6/20  Yes Nirmal Calvillo MD   oxybutynin XL (DITROPAN-XL) 5 MG 24 hr tablet  9/10/20  Yes Caio Thompson MD   pantoprazole (PROTONIX) 40 MG EC tablet Take 1 tablet by mouth Daily.  Patient taking differently: Take 40  mg by mouth Every Night. 9/2/20  Yes Nirmal Calvillo MD   potassium chloride (MICRO-K) 10 MEQ CR capsule Take 1 capsule by mouth Daily. 9/2/20  Yes Nirmal Calvillo MD   promethazine (PHENERGAN) 25 MG tablet Take 1 tablet by mouth Every 6 (Six) Hours As Needed for Nausea or Vomiting. 8/6/20  Yes Nirmal Calvillo MD   sodium bicarbonate 325 MG tablet Take 325 mg by mouth 2 (Two) Times a Day.   Yes Caio Thompson MD   atorvastatin (LIPITOR) 20 MG tablet  9/25/20 10/5/20 Yes Caio Thompson MD   aspirin (aspirin) 81 MG EC tablet Take 1 tablet by mouth Daily. 9/2/20   Nirmal Calvillo MD   Blood Glucose Monitoring Suppl ([employer] BLOOD GLUCOSE METER) w/Device kit 1 each 3 (Three) Times a Day. 8/6/20   Nirmal Calvillo MD   EASY TOUCH PEN NEEDLES 31G X 8 MM misc  8/7/20   Caio Thompson MD   glucose blood test strip Use as instructed 9/2/20   Nirmal Calvillo MD   Insulin Pen Needle (NovoFine) 30G X 8 MM misc As directed 4 times daily 8/6/20   Nirmal Calvillo MD   ipratropium-albuterol (DUO-NEB) 0.5-2.5 mg/3 ml nebulizer Take 3 mL by nebulization 4 (Four) Times a Day. ICD10 code J96.01 8/6/20   Nirmal Calvillo MD   metFORMIN (GLUCOPHAGE) 1000 MG tablet Take 1 tablet by mouth 2 (Two) Times a Day With Meals. 9/2/20   Nirmal Calvillo MD   naproxen (NAPROSYN) 500 MG tablet Take 1 tablet by mouth 2 (Two) Times a Day With Meals. 9/2/20   Nirmal Calvillo MD   nicotine (Nicoderm CQ) 21 MG/24HR patch Place 1 patch on the skin as directed by provider Daily. 8/6/20   Nirmal Calvillo MD   SPS 15 GM/60ML suspension  9/3/20   Caio Thompson MD       ALLERGIES:  Adhesive tape and Other    REVIEW OF SYSTEMS  Review of Systems   Constitutional: Negative for activity change and appetite change.   HENT: Negative for congestion and trouble swallowing.    Respiratory: Negative for chest tightness and shortness of breath.    Cardiovascular: Positive for chest pain.  Negative for palpitations.   Gastrointestinal: Negative for abdominal distention and abdominal pain.   Genitourinary: Negative for difficulty urinating and dysuria.   Musculoskeletal: Positive for neck pain. Negative for arthralgias and myalgias.   Skin: Negative for color change and pallor.   Neurological: Negative for dizziness, light-headedness and headaches.   Psychiatric/Behavioral: Negative for agitation and behavioral problems.       PHYSICAL EXAM:  Temp:  [96.4 °F (35.8 °C)-98.2 °F (36.8 °C)] 96.4 °F (35.8 °C)  Heart Rate:  [84-92] 92  Resp:  [18-22] 18  BP: (131-168)/(64-81) 168/76  Body mass index is 51.94 kg/m².  Physical Exam  Constitutional:       General: She is not in acute distress.     Appearance: Normal appearance. She is well-developed.   HENT:      Head: Normocephalic and atraumatic.      Right Ear: External ear normal.      Left Ear: External ear normal.      Nose: Nose normal.   Eyes:      Extraocular Movements: Extraocular movements intact.      Pupils: Pupils are equal, round, and reactive to light.   Neck:      Musculoskeletal: Normal range of motion and neck supple.   Cardiovascular:      Rate and Rhythm: Normal rate and regular rhythm.      Heart sounds: Normal heart sounds. No murmur. No friction rub. No gallop.    Pulmonary:      Effort: Pulmonary effort is normal. No respiratory distress.      Breath sounds: Normal breath sounds. No wheezing or rales.   Abdominal:      General: Bowel sounds are normal. There is no distension.      Palpations: Abdomen is soft.      Tenderness: There is no abdominal tenderness.   Musculoskeletal: Normal range of motion.      Right lower leg: No edema.      Left lower leg: No edema.   Skin:     General: Skin is warm.      Capillary Refill: Capillary refill takes less than 2 seconds.      Findings: No erythema.   Neurological:      General: No focal deficit present.      Mental Status: She is alert and oriented to person, place, and time. Mental status is  at baseline.   Psychiatric:         Mood and Affect: Mood normal.         Behavior: Behavior normal.         DIAGNOSTIC DATA:   Lab Results (last 24 hours)     Procedure Component Value Units Date/Time    Troponin [[id number]]  (Normal) Collected: 10/05/20 2004    Specimen: Blood Updated: 10/05/20 2025     Troponin T <0.010 ng/mL     Narrative:      Troponin T Reference Range:  <= 0.03 ng/mL-   Negative for AMI  >0.03 ng/mL-     Abnormal for myocardial necrosis.  Clinicians would have to utilize clinical acumen, EKG, Troponin and serial changes to determine if it is an Acute Myocardial Infarction or myocardial injury due to an underlying chronic condition.       Results may be falsely decreased if patient taking Biotin.      COVID PRE-OP / PRE-PROCEDURE SCREENING ORDER (NO ISOLATION) - Swab, Nasopharynx [[id number]] Collected: 10/05/20 1853    Specimen: Swab from Nasopharynx Updated: 10/05/20 1856    Narrative:      The following orders were created for panel order COVID PRE-OP / PRE-PROCEDURE SCREENING ORDER (NO ISOLATION) - Swab, Nasopharynx.  Procedure                               Abnormality         Status                     ---------                               -----------         ------                     COVID-19, [facility] IN-HOUS...[[id number]]                      In process                   Please view results for these tests on the individual orders.    COVID-19, [facility] IN-HOUSE, NP SWAB IN TRANSPORT MEDIA 8-10 HR TAT - Swab, Nasopharynx [[id number]] Collected: 10/05/20 1853    Specimen: Swab from Nasopharynx Updated: 10/05/20 1856    [place] Draw [[id number]] Collected: 10/05/20 1513    Specimen: Blood Updated: 10/05/20 1616    Narrative:      The following orders were created for panel order [place] Draw.  Procedure                               Abnormality         Status                     ---------                               -----------         ------                     Light Blue Top[[id number]]                                    Final result               Green Top (Gel)[[id number]]                                  Final result               Lavender Top[[id number]]                                     Final result               Gold Top - SST[[id number]]                                   Final result                 Please view results for these tests on the individual orders.    Light Blue Top [[id number]] Collected: 10/05/20 1513    Specimen: Blood Updated: 10/05/20 1616     Extra Tube hold for add-on     Comment: Auto resulted       Green Top (Gel) [[id number]] Collected: 10/05/20 1513    Specimen: Blood Updated: 10/05/20 1616     Extra Tube Hold for add-ons.     Comment: Auto resulted.       Lavender Top [[id number]] Collected: 10/05/20 1513    Specimen: Blood Updated: 10/05/20 1616     Extra Tube hold for add-on     Comment: Auto resulted       Gold Top - SST [[id number]] Collected: 10/05/20 1513    Specimen: Blood Updated: 10/05/20 1616     Extra Tube Hold for add-ons.     Comment: Auto resulted.       Basic Metabolic Panel [[id number]]  (Abnormal) Collected: 10/05/20 1513    Specimen: Blood Updated: 10/05/20 1545     Glucose 265 mg/dL      BUN 35 mg/dL      Creatinine 1.79 mg/dL      Sodium 135 mmol/L      Potassium 4.3 mmol/L      Comment: Slight hemolysis detected by analyzer. Results may be affected.        Chloride 100 mmol/L      CO2 22.0 mmol/L      Calcium 9.0 mg/dL      eGFR Non African Amer 29 mL/min/1.73      BUN/Creatinine Ratio 19.6     Anion Gap 13.0 mmol/L     Narrative:      GFR Normal >60  Chronic Kidney Disease <60  Kidney Failure <15      Troponin [[id number]]  (Normal) Collected: 10/05/20 1513    Specimen: Blood Updated: 10/05/20 1543     Troponin T <0.010 ng/mL     Narrative:      Troponin T Reference Range:  <= 0.03 ng/mL-   Negative for AMI  >0.03 ng/mL-     Abnormal for myocardial necrosis.  Clinicians would have to utilize clinical acumen, EKG, Troponin and serial changes to determine if  it is an Acute Myocardial Infarction or myocardial injury due to an underlying chronic condition.       Results may be falsely decreased if patient taking Biotin.      BNP [[id number]]  (Normal) Collected: 10/05/20 1513    Specimen: Blood Updated: 10/05/20 1542     proBNP 246.0 pg/mL     Narrative:      Among patients with dyspnea, NT-proBNP is highly sensitive for the detection of acute congestive heart failure. In addition NT-proBNP of <300 pg/ml effectively rules out acute congestive heart failure with 99% negative predictive value.    Results may be falsely decreased if patient taking Biotin.      CBC & Differential [[id number]]  (Abnormal) Collected: 10/05/20 1513    Specimen: Blood Updated: 10/05/20 1522    Narrative:      The following orders were created for panel order CBC & Differential.  Procedure                               Abnormality         Status                     ---------                               -----------         ------                     CBC Auto Differential[[id number]]        Abnormal            Final result                 Please view results for these tests on the individual orders.    CBC Auto Differential [[id number]]  (Abnormal) Collected: 10/05/20 1513    Specimen: Blood Updated: 10/05/20 1522     WBC 11.70 10*3/mm3      RBC 4.31 10*6/mm3      Hemoglobin 11.6 g/dL      Hematocrit 36.8 %      MCV 85.4 fL      MCH 26.9 pg      MCHC 31.5 g/dL      RDW 15.5 %      RDW-SD 48.3 fl      MPV 9.2 fL      Platelets 285 10*3/mm3      Neutrophil % 63.2 %      Lymphocyte % 24.3 %      Monocyte % 6.4 %      Eosinophil % 4.5 %      Basophil % 0.9 %      Immature Grans % 0.7 %      Neutrophils, Absolute 7.40 10*3/mm3      Lymphocytes, Absolute 2.84 10*3/mm3      Monocytes, Absolute 0.75 10*3/mm3      Eosinophils, Absolute 0.53 10*3/mm3      Basophils, Absolute 0.10 10*3/mm3      Immature Grans, Absolute 0.08 10*3/mm3      nRBC 0.0 /100 WBC            Imaging Results (Last 24 Hours)     Procedure  Component Value Units Date/Time    XR Chest 2 View [[id number]] Collected: 10/05/20 1537     Updated: 10/05/20 1557    Narrative:      Chest x-ray PA and lateral         CLINICAL INDICATION: Shortness of breath. Chest pain    COMPARISON: October 28, 2018.    FINDINGS: Cardiac silhouette is enlarged in size. Pulmonary  vascularity is increased.   Midline sternal sutures from prior cardiac surgery.  No focal infiltrate or consolidation.  No pleural effusion.  No  pneumothorax.      Impression:      Cardiomegaly. Midline sternal sutures from prior  cardiac surgery. Pulmonary vascular prominence. Otherwise  unremarkable chest.    Electronically signed by:  Hugo Russo MD  10/5/2020 3:56 PM CDT  Workstation: [id number]            I reviewed the patient's new clinical results.    ASSESSMENT AND PLAN: This is a 60 y.o. female with:    Active Hospital Problems    Diagnosis POA   • **Chest pain [R07.9] Yes     -Has a history of CABG in 2013, peripheral artery disease, carotid stenting  -On admission vital signs are stable troponin negative x2 EKG did not show any signs of ischemia, and chest x-ray showed cardiomegaly, midline sternal sutures from CABG, pulmonary vascular prominence as per radiologist read.  -MANISHA therapy  -Cardiac cath was postponed due to ERIKA on CKD as per cardiology note on July 14, 2020     • Left hip pain [M25.552] Yes     - XR left hip  -Tylenol as needed     • Neck pain [M54.2] Yes     -Tylenol as needed  -Muscle relaxer as needed     • Stage 3 chronic kidney disease [N18.30] Yes     -Continue home medications  -We will continue to monitor. Baseline Cr ~ 1.7     • DM type 2 with diabetic peripheral neuropathy (CMS/HCC) [E11.42] Yes     -Hold oral hypoglycemics  -Continue insulin  -ISS     • Anemia [D64.9] Yes     -Chronic  - iron suplementation     • Chronic obstructive pulmonary disease (CMS/HCC) [J44.9] Yes     -Continue home medications  -We'll provide duo nebs 4 times a day while awake and  albuterol as needed for wheezing and shortness of air  -We'll provide supplemental oxygen as needed in order to maintain saturation greater than 90%  -Encourage smoking cessation     • Dyslipidemia [E78.5] Yes     -Continue home medication     • Essential hypertension [I10] Yes     -Continue home medication     • GERD (gastroesophageal reflux disease) [K21.9] Yes     -Continue home medication     • MIKE on CPAP [G47.33, Z99.89] Not Applicable     -CPAP ordered     • Carotid artery disease (CMS/[facility]) [I77.9] Yes     -Status post stenting  -Continue home medications     • Current smoker [F17.200] Yes     -Patch     • PAD (peripheral artery disease) (CMS/[facility]) [I73.9] Yes     -Continue home medications         DVT prophylaxis: Yudelkasindi     Yamilethjameson Slater and I have discussed pain goals for this hospitalization after reviewing her current clinical condition, medical history and prior pain experiences.  The goal is to keep the pain level <2.  To help achieve this, I plan to order tylenol and morphine as needed.    JULIAN # [id number], reviewed and consistent with patient reported medications.    Expected Length of Stay: Where: current living arrangements and When:  1-2 days    I discussed the patient's findings and my recommendations with patient and primary care team.     Kit Brar MD is the attending on record at time of admission, He is aware of the patient's status and agrees with the above history and physical.          This document has been electronically signed by Nirmal Calvillo MD on October 5, 2020 21:00 CDT

## 2020-10-06 NOTE — PROGRESS NOTES
FAMILY MEDICINE DAILY PROGRESS NOTE  NAME: Yamileth Slater  : 1960  MRN: [id number]     LOS: 0 days     PROVIDER OF SERVICE: Nirmal Calvillo MD    Chief Complaint: Chest pain    Subjective:     Interval History:  History taken from: patient chart  VSS, no acute complaints overnight, troponin negative x3. Neck pain improved mildly with medication and was able to get sleep. Chest pain is the same and has not worsened or improved. Chest tenderness noted. XR hip unremarkable.    Review of Systems:   Review of Systems   Constitutional: Negative for activity change and appetite change.   HENT: Negative for congestion and trouble swallowing.    Respiratory: Negative for chest tightness and shortness of breath.    Cardiovascular: Positive for chest pain. Negative for palpitations.   Gastrointestinal: Negative for abdominal distention and abdominal pain.   Genitourinary: Negative for difficulty urinating and dysuria.   Musculoskeletal: Positive for neck pain. Negative for arthralgias and myalgias.   Skin: Negative for color change and pallor.   Neurological: Negative for dizziness, light-headedness and headaches.   Psychiatric/Behavioral: Negative for agitation and behavioral problems.       Objective:     Vital Signs  Temp:  [96.1 °F (35.6 °C)-98.2 °F (36.8 °C)] 96.1 °F (35.6 °C)  Heart Rate:  [78-92] 92  Resp:  [18-22] 18  BP: (131-168)/(64-86) 142/73    Physical Exam  Physical Exam  Constitutional:       General: She is not in acute distress.     Appearance: She is well-developed.   HENT:      Head: Normocephalic and atraumatic.      Right Ear: External ear normal.      Left Ear: External ear normal.      Nose: Nose normal.   Eyes:      Extraocular Movements: Extraocular movements intact.      Pupils: Pupils are equal, round, and reactive to light.   Neck:      Musculoskeletal: Normal range of motion and neck supple.   Cardiovascular:      Rate and Rhythm: Normal rate and regular rhythm.      Heart  sounds: Normal heart sounds. No murmur. No friction rub. No gallop.    Pulmonary:      Effort: Pulmonary effort is normal. No respiratory distress.      Breath sounds: Normal breath sounds. No wheezing or rales.   Chest:      Chest wall: Tenderness present.   Abdominal:      General: Bowel sounds are normal. There is no distension.      Palpations: Abdomen is soft.      Tenderness: There is no abdominal tenderness.   Musculoskeletal: Normal range of motion.      Right lower leg: No edema.      Left lower leg: No edema.   Skin:     General: Skin is warm.      Capillary Refill: Capillary refill takes less than 2 seconds.      Findings: No erythema.   Neurological:      Mental Status: She is alert and oriented to person, place, and time. Mental status is at baseline.   Psychiatric:         Mood and Affect: Mood normal.         Behavior: Behavior normal.         Medication Review    Current Facility-Administered Medications:   •  acetaminophen (TYLENOL) tablet 650 mg, 650 mg, Oral, Q4H PRN **OR** acetaminophen (TYLENOL) 160 MG/5ML solution 650 mg, 650 mg, Oral, Q4H PRN **OR** acetaminophen (TYLENOL) suppository 650 mg, 650 mg, Rectal, Q4H PRN, Nirmal Calvillo MD  •  albuterol (PROVENTIL) nebulizer solution 0.083% 2.5 mg/3mL, 2.5 mg, Nebulization, Q4H PRN, Nirmal Calvillo MD  •  atorvastatin (LIPITOR) tablet 40 mg, 40 mg, Oral, Daily, Nirmal Calvillo MD  •  cetirizine (zyrTEC) tablet 10 mg, 10 mg, Oral, Daily, Nirmal Calvillo MD  •  clopidogrel (PLAVIX) tablet 75 mg, 75 mg, Oral, Daily, Nirmal Calvillo MD  •  cyclobenzaprine (FLEXERIL) tablet 10 mg, 10 mg, Oral, BID PRN, Nirmal Calvillo MD  •  dextrose (D50W) 25 g/ 50mL Intravenous Solution 25 g, 25 g, Intravenous, Q15 Min PRN, Nirmal Calvillo MD  •  dextrose (GLUTOSE) oral gel 15 g, 15 g, Oral, Q15 Min PRN, Nirmal Calvillo MD  •  enoxaparin (LOVENOX) syringe 40 mg, 40 mg, Subcutaneous, Q24H, Nirmal Calvillo MD, 40 mg at  10/05/20 2042  •  ferrous sulfate EC tablet 324 mg, 324 mg, Oral, Daily With Breakfast, Nirmal Calvillo MD  •  furosemide (LASIX) tablet 40 mg, 40 mg, Oral, Daily, Nirmal Calvillo MD, 40 mg at 10/05/20 2042  •  gabapentin (NEURONTIN) capsule 800 mg, 800 mg, Oral, Q8H, Nirmal Calvillo MD, 800 mg at 10/06/20 0556  •  glucagon (human recombinant) (GLUCAGEN DIAGNOSTIC) injection 1 mg, 1 mg, Subcutaneous, Q15 Min PRN, Nirmal Calvillo MD  •  influenza vac split quad (FLUZONE,FLUARIX,AFLURIA,FLULAVAL) injection 0.5 mL, 0.5 mL, Intramuscular, During Hospitalization, Nirmal Calvillo MD  •  insulin aspart (novoLOG) injection 0-9 Units, 0-9 Units, Subcutaneous, TID AC, Nirmal Calvillo MD  •  insulin detemir (LEVEMIR) injection 95 Units, 95 Units, Subcutaneous, Q12H, Nirmal Calvillo MD, 95 Units at 10/05/20 2047  •  ipratropium-albuterol (DUO-NEB) nebulizer solution 3 mL, 3 mL, Nebulization, 4x Daily PRN, Nirmal Calvillo MD  •  lidocaine (LIDODERM) 5 % 1 patch, 1 patch, Transdermal, Q24H, Nirmal Calvillo MD, 1 patch at 10/05/20 2042  •  lisinopril (PRINIVIL,ZESTRIL) tablet 10 mg, 10 mg, Oral, Daily, Nirmal Calvillo MD  •  metoprolol tartrate (LOPRESSOR) tablet 50 mg, 50 mg, Oral, Q12H, Nirmal Calvillo MD, 50 mg at 10/05/20 2042  •  montelukast (SINGULAIR) tablet 10 mg, 10 mg, Oral, Nightly, Nirmal Calvillo MD, 10 mg at 10/05/20 2042  •  morphine injection 1 mg, 1 mg, Intravenous, Q4H PRN, 1 mg at 10/06/20 0211 **AND** naloxone (NARCAN) injection 0.4 mg, 0.4 mg, Intravenous, Q5 Min PRN, Nirmal Calvillo MD  •  nicotine (NICODERM CQ) 21 MG/24HR patch 1 patch, 1 patch, Transdermal, Q24H, Nirmal Calvillo MD, 1 patch at 10/05/20 2042  •  nitroglycerin (NITROSTAT) SL tablet 0.4 mg, 0.4 mg, Sublingual, PRN, Nirmal Calvillo MD  •  ondansetron (ZOFRAN) tablet 4 mg, 4 mg, Oral, Q6H PRN **OR** ondansetron (ZOFRAN) injection 4 mg, 4 mg, Intravenous, Q6H PRN,  Nirmal Calvillo MD  •  oxybutynin XL (DITROPAN-XL) 24 hr tablet 5 mg, 5 mg, Oral, Daily, Nirmal Calvillo MD  •  pantoprazole (PROTONIX) EC tablet 40 mg, 40 mg, Oral, Daily, Nirmal Calvillo MD, 40 mg at 10/05/20 2042  •  sodium bicarbonate tablet 325 mg, 325 mg, Oral, BID, Nirmal Calvillo MD, 325 mg at 10/05/20 2041  •  sodium chloride 0.9 % flush 10 mL, 10 mL, Intravenous, Q12H, Nirmal Calvillo MD, 10 mL at 10/05/20 2046  •  sodium chloride 0.9 % flush 10 mL, 10 mL, Intravenous, PRN, Nirmal Calvillo MD  •  sodium chloride 0.9 % infusion, 50 mL/hr, Intravenous, Continuous, Nirmal Calvillo MD, Last Rate: 50 mL/hr at 10/06/20 0522, 50 mL/hr at 10/06/20 0522     Diagnostic Data    Lab Results (last 24 hours)     Procedure Component Value Units Date/Time    Basic Metabolic Panel [[id number]]  (Abnormal) Collected: 10/06/20 0544    Specimen: Blood Updated: 10/06/20 0620     Glucose 155 mg/dL      BUN 34 mg/dL      Creatinine 1.78 mg/dL      Sodium 138 mmol/L      Potassium 3.5 mmol/L      Chloride 103 mmol/L      CO2 24.0 mmol/L      Calcium 9.2 mg/dL      eGFR Non African Amer 29 mL/min/1.73      BUN/Creatinine Ratio 19.1     Anion Gap 11.0 mmol/L     Narrative:      GFR Normal >60  Chronic Kidney Disease <60  Kidney Failure <15      CBC & Differential [[id number]]  (Abnormal) Collected: 10/06/20 0544    Specimen: Blood Updated: 10/06/20 0606    Narrative:      The following orders were created for panel order CBC & Differential.  Procedure                               Abnormality         Status                     ---------                               -----------         ------                     CBC Auto Differential[[id number]]        Abnormal            Final result                 Please view results for these tests on the individual orders.    CBC Auto Differential [[id number]]  (Abnormal) Collected: 10/06/20 0544    Specimen: Blood Updated: 10/06/20 0606     WBC 10.09 10*3/mm3         RBC 4.24 10*6/mm3      Hemoglobin 11.4 g/dL      Hematocrit 35.9 %      MCV 84.7 fL      MCH 26.9 pg      MCHC 31.8 g/dL      RDW 15.6 %      RDW-SD 48.0 fl      MPV 9.3 fL      Platelets 265 10*3/mm3      Neutrophil % 57.7 %      Lymphocyte % 28.4 %      Monocyte % 6.2 %      Eosinophil % 6.2 %      Basophil % 0.9 %      Immature Grans % 0.6 %      Neutrophils, Absolute 5.81 10*3/mm3      Lymphocytes, Absolute 2.87 10*3/mm3      Monocytes, Absolute 0.63 10*3/mm3      Eosinophils, Absolute 0.63 10*3/mm3      Basophils, Absolute 0.09 10*3/mm3      Immature Grans, Absolute 0.06 10*3/mm3      nRBC 0.0 /100 WBC     Troponin [[id number]]  (Normal) Collected: 10/05/20 2257    Specimen: Blood Updated: 10/05/20 2330     Troponin T <0.010 ng/mL     Narrative:      Troponin T Reference Range:  <= 0.03 ng/mL-   Negative for AMI  >0.03 ng/mL-     Abnormal for myocardial necrosis.  Clinicians would have to utilize clinical acumen, EKG, Troponin and serial changes to determine if it is an Acute Myocardial Infarction or myocardial injury due to an underlying chronic condition.       Results may be falsely decreased if patient taking Biotin.      COVID PRE-OP / PRE-PROCEDURE SCREENING ORDER (NO ISOLATION) - Swab, Nasopharynx [[id number]]  (Normal) Collected: 10/05/20 1853    Specimen: Swab from Nasopharynx Updated: 10/05/20 2306    Narrative:      The following orders were created for panel order COVID PRE-OP / PRE-PROCEDURE SCREENING ORDER (NO ISOLATION) - Swab, Nasopharynx.  Procedure                               Abnormality         Status                     ---------                               -----------         ------                     COVID-[facility] IN-HOUS...[[id number]]  Normal              Final result                 Please view results for these tests on the individual orders.    COVID-[facility] IN-HOUSE, NP SWAB IN TRANSPORT MEDIA 8-10 HR TAT - Swab, Nasopharynx [[id number]]  (Normal) Collected: 10/05/20 1853     Specimen: Swab from Nasopharynx Updated: 10/05/20 2306     COVID19 Not Detected    Narrative:      Testing performed by Real Time RT-PCR  This test has not been approved by the [facility] but is authorized under the Emergency Use Act (EUA)    Fact sheet for providers: https://www.fda.gov/media/026847/download    Fact sheet for patients: https://www.fda.gov/media/723532/download        Troponin [[id number]]  (Normal) Collected: 10/05/20 2004    Specimen: Blood Updated: 10/05/20 2025     Troponin T <0.010 ng/mL     Narrative:      Troponin T Reference Range:  <= 0.03 ng/mL-   Negative for AMI  >0.03 ng/mL-     Abnormal for myocardial necrosis.  Clinicians would have to utilize clinical acumen, EKG, Troponin and serial changes to determine if it is an Acute Myocardial Infarction or myocardial injury due to an underlying chronic condition.       Results may be falsely decreased if patient taking Biotin.      [place] Draw [[id number]] Collected: 10/05/20 1513    Specimen: Blood Updated: 10/05/20 1616    Narrative:      The following orders were created for panel order [place] Draw.  Procedure                               Abnormality         Status                     ---------                               -----------         ------                     Light Blue Top[[id number]]                                   Final result               Green Top (Gel)[[id number]]                                  Final result               Lavender Top[[id number]]                                     Final result               Gold Top - SST[[id number]]                                   Final result                 Please view results for these tests on the individual orders.    Light Blue Top [[id number]] Collected: 10/05/20 1513    Specimen: Blood Updated: 10/05/20 1616     Extra Tube hold for add-on     Comment: Auto resulted       Green Top (Gel) [[id number]] Collected: 10/05/20 1513    Specimen: Blood Updated: 10/05/20 1616     Extra Tube  Hold for add-ons.     Comment: Auto resulted.       Lavender Top [[id number]] Collected: 10/05/20 1513    Specimen: Blood Updated: 10/05/20 1616     Extra Tube hold for add-on     Comment: Auto resulted       Gold Top - SST [[id number]] Collected: 10/05/20 1513    Specimen: Blood Updated: 10/05/20 1616     Extra Tube Hold for add-ons.     Comment: Auto resulted.       Basic Metabolic Panel [[id number]]  (Abnormal) Collected: 10/05/20 1513    Specimen: Blood Updated: 10/05/20 1545     Glucose 265 mg/dL      BUN 35 mg/dL      Creatinine 1.79 mg/dL      Sodium 135 mmol/L      Potassium 4.3 mmol/L      Comment: Slight hemolysis detected by analyzer. Results may be affected.        Chloride 100 mmol/L      CO2 22.0 mmol/L      Calcium 9.0 mg/dL      eGFR Non African Amer 29 mL/min/1.73      BUN/Creatinine Ratio 19.6     Anion Gap 13.0 mmol/L     Narrative:      GFR Normal >60  Chronic Kidney Disease <60  Kidney Failure <15      Troponin [[id number]]  (Normal) Collected: 10/05/20 1513    Specimen: Blood Updated: 10/05/20 1543     Troponin T <0.010 ng/mL     Narrative:      Troponin T Reference Range:  <= 0.03 ng/mL-   Negative for AMI  >0.03 ng/mL-     Abnormal for myocardial necrosis.  Clinicians would have to utilize clinical acumen, EKG, Troponin and serial changes to determine if it is an Acute Myocardial Infarction or myocardial injury due to an underlying chronic condition.       Results may be falsely decreased if patient taking Biotin.      BNP [[id number]]  (Normal) Collected: 10/05/20 1513    Specimen: Blood Updated: 10/05/20 1542     proBNP 246.0 pg/mL     Narrative:      Among patients with dyspnea, NT-proBNP is highly sensitive for the detection of acute congestive heart failure. In addition NT-proBNP of <300 pg/ml effectively rules out acute congestive heart failure with 99% negative predictive value.    Results may be falsely decreased if patient taking Biotin.      CBC & Differential [[id number]]  (Abnormal)  Collected: 10/05/20 1513    Specimen: Blood Updated: 10/05/20 1522    Narrative:      The following orders were created for panel order CBC & Differential.  Procedure                               Abnormality         Status                     ---------                               -----------         ------                     CBC Auto Differential[[id number]]        Abnormal            Final result                 Please view results for these tests on the individual orders.    CBC Auto Differential [[id number]]  (Abnormal) Collected: 10/05/20 1513    Specimen: Blood Updated: 10/05/20 1522     WBC 11.70 10*3/mm3      RBC 4.31 10*6/mm3      Hemoglobin 11.6 g/dL      Hematocrit 36.8 %      MCV 85.4 fL      MCH 26.9 pg      MCHC 31.5 g/dL      RDW 15.5 %      RDW-SD 48.3 fl      MPV 9.2 fL      Platelets 285 10*3/mm3      Neutrophil % 63.2 %      Lymphocyte % 24.3 %      Monocyte % 6.4 %      Eosinophil % 4.5 %      Basophil % 0.9 %      Immature Grans % 0.7 %      Neutrophils, Absolute 7.40 10*3/mm3      Lymphocytes, Absolute 2.84 10*3/mm3      Monocytes, Absolute 0.75 10*3/mm3      Eosinophils, Absolute 0.53 10*3/mm3      Basophils, Absolute 0.10 10*3/mm3      Immature Grans, Absolute 0.08 10*3/mm3      nRBC 0.0 /100 WBC            Imaging Results (Last 24 Hours)     Procedure Component Value Units Date/Time    XR Hip With or Without Pelvis 2 - 3 View Left [[id number]] Collected: 10/05/20 2106     Updated: 10/05/20 2223    Narrative:      Pelvis and left hip x-ray three views on 10/5/2020    CLINICAL INDICATION: Left hip pain    COMPARISON: 8/11/2018    FINDINGS: Vascular calcifications are noted. The hips are well  located. The SI joints are well aligned. Degenerative changes are  noted in the lower lumbar spine. There are no fractures. No  significant degenerative changes are noted in the hips.      Impression:      No acute abnormality.    Electronically signed by:  Eusebio Sargent  10/5/2020 10:22 PM  CDT  Workstation: [id number]    XR Chest 2 View [[id number]] Collected: 10/05/20 1537     Updated: 10/05/20 1557    Narrative:      Chest x-ray PA and lateral         CLINICAL INDICATION: Shortness of breath. Chest pain    COMPARISON: October 28, 2018.    FINDINGS: Cardiac silhouette is enlarged in size. Pulmonary  vascularity is increased.   Midline sternal sutures from prior cardiac surgery.  No focal infiltrate or consolidation.  No pleural effusion.  No  pneumothorax.      Impression:      Cardiomegaly. Midline sternal sutures from prior  cardiac surgery. Pulmonary vascular prominence. Otherwise  unremarkable chest.    Electronically signed by:  Hugo Russo MD  10/5/2020 3:56 PM CDT  Workstation: [id number]          I reviewed the patient's new clinical results.    Assessment/Plan:     Active Hospital Problems    Diagnosis   • **Chest pain     -Has a history of CABG in 2013, peripheral artery disease, carotid stenting  -On admission vital signs are stable troponin negative x2 EKG did not show any signs of ischemia, and chest x-ray showed cardiomegaly, midline sternal sutures from CABG, pulmonary vascular prominence as per radiologist read.  -MANISHA therapy  -Cardiac cath was postponed due to ERIKA on CKD as per cardiology note on July 14, 2020     • Left hip pain     - XR left hip unremarkable  -Tylenol as needed     • Neck pain     -Tylenol as needed  -Muscle relaxer as needed     • Stage 3 chronic kidney disease     -Continue home medications  -We will continue to monitor. Baseline Cr ~ 1.7     • DM type 2 with diabetic peripheral neuropathy (CMS/HCC)     -Hold oral hypoglycemics  -Continue insulin  -ISS     • Anemia     -Chronic  - iron suplementation     • Chronic obstructive pulmonary disease (CMS/HCC)     -Continue home medications  -We'll provide duo nebs 4 times a day while awake and albuterol as needed for wheezing and shortness of air  -We'll provide supplemental oxygen as needed in order to maintain saturation  greater than 90%  -Encourage smoking cessation     • Dyslipidemia     -Continue home medication     • Essential hypertension     -Continue home medication     • GERD (gastroesophageal reflux disease)     -Continue home medication     • MIKE on CPAP     -CPAP ordered     • Carotid artery disease (CMS/[facility])     -Status post stenting  -Continue home medications     • Current smoker     -Patch     • PAD (peripheral artery disease) (CMS/[facility])     -Continue home medications           DVT prophylaxis: Lovenox  Code Status and Medical Interventions:   Ordered at: 10/05/20 1934     Code Status:    CPR     Medical Interventions (Level of Support Prior to Arrest):    Full       Plan for disposition:Where: current living arrangements and When:  1-2 days      Time: 15 min          This document has been electronically signed by Nirmal Calvillo MD on October 6, 2020 06:27 CDT

## 2020-10-07 ENCOUNTER — READMISSION MANAGEMENT (OUTPATIENT)
Dept: CALL CENTER | Facility: HOSPITAL | Age: 60
End: 2020-10-07

## 2020-10-08 ENCOUNTER — READMISSION MANAGEMENT (OUTPATIENT)
Dept: CALL CENTER | Facility: HOSPITAL | Age: 60
End: 2020-10-08

## 2020-10-08 NOTE — OUTREACH NOTE
Medical Week 1 Survey      Responses   [facility] [place] patient discharged from?  [place]   Does the patient have one of the following disease processes/diagnoses(primary or secondary)?  Other   Week 1 attempt successful?  No   Unsuccessful attempts  Attempt 1          Juliana Fields RN

## 2020-10-09 ENCOUNTER — OFFICE VISIT (OUTPATIENT)
Dept: FAMILY MEDICINE CLINIC | Facility: CLINIC | Age: 60
End: 2020-10-09

## 2020-10-09 ENCOUNTER — TELEPHONE (OUTPATIENT)
Dept: FAMILY MEDICINE CLINIC | Facility: CLINIC | Age: 60
End: 2020-10-09

## 2020-10-09 VITALS
HEART RATE: 62 BPM | TEMPERATURE: 97.6 F | SYSTOLIC BLOOD PRESSURE: 132 MMHG | DIASTOLIC BLOOD PRESSURE: 78 MMHG | OXYGEN SATURATION: 97 %

## 2020-10-09 DIAGNOSIS — IMO0002 UNCONTROLLED TYPE 2 DIABETES MELLITUS WITH DIABETIC POLYNEUROPATHY, WITH LONG-TERM CURRENT USE OF INSULIN: ICD-10-CM

## 2020-10-09 DIAGNOSIS — E11.69 DIABETES MELLITUS TYPE 2 IN OBESE (HCC): ICD-10-CM

## 2020-10-09 DIAGNOSIS — Z09 HOSPITAL DISCHARGE FOLLOW-UP: Primary | ICD-10-CM

## 2020-10-09 DIAGNOSIS — D50.8 OTHER IRON DEFICIENCY ANEMIA: ICD-10-CM

## 2020-10-09 DIAGNOSIS — E66.9 DIABETES MELLITUS TYPE 2 IN OBESE (HCC): ICD-10-CM

## 2020-10-09 DIAGNOSIS — R52 PAIN: ICD-10-CM

## 2020-10-09 DIAGNOSIS — J45.30 MILD PERSISTENT ASTHMA WITHOUT COMPLICATION: ICD-10-CM

## 2020-10-09 DIAGNOSIS — J44.0 CHRONIC OBSTRUCTIVE PULMONARY DISEASE WITH ACUTE LOWER RESPIRATORY INFECTION (HCC): ICD-10-CM

## 2020-10-09 PROCEDURE — 99213 OFFICE O/P EST LOW 20 MIN: CPT | Performed by: STUDENT IN AN ORGANIZED HEALTH CARE EDUCATION/TRAINING PROGRAM

## 2020-10-09 RX ORDER — OXYBUTYNIN CHLORIDE 5 MG/1
TABLET, EXTENDED RELEASE ORAL
Status: CANCELLED | OUTPATIENT
Start: 2020-10-09

## 2020-10-09 RX ORDER — BLOOD-GLUCOSE METER
1 EACH MISCELLANEOUS 3 TIMES DAILY
Qty: 1 KIT | Refills: 3 | Status: SHIPPED | OUTPATIENT
Start: 2020-10-09

## 2020-10-09 RX ORDER — ALBUTEROL SULFATE 0.63 MG/3ML
1 SOLUTION RESPIRATORY (INHALATION) EVERY 6 HOURS PRN
Qty: 20 VIAL | Refills: 0 | Status: SHIPPED | OUTPATIENT
Start: 2020-10-09 | End: 2020-12-18

## 2020-10-09 RX ORDER — POTASSIUM CHLORIDE 750 MG/1
10 CAPSULE, EXTENDED RELEASE ORAL DAILY
Qty: 30 CAPSULE | Refills: 5 | Status: SHIPPED | OUTPATIENT
Start: 2020-10-09 | End: 2021-03-26 | Stop reason: SDUPTHER

## 2020-10-09 RX ORDER — SODIUM BICARBONATE 325 MG/1
325 TABLET ORAL 2 TIMES DAILY
Qty: 60 TABLET | Refills: 5 | Status: SHIPPED | OUTPATIENT
Start: 2020-10-09 | End: 2021-03-26 | Stop reason: SDUPTHER

## 2020-10-09 RX ORDER — LIDOCAINE 50 MG/G
1 PATCH TOPICAL
Qty: 15 EACH | Refills: 0 | Status: SHIPPED | OUTPATIENT
Start: 2020-10-09 | End: 2020-11-17

## 2020-10-09 RX ORDER — CYCLOBENZAPRINE HCL 10 MG
10 TABLET ORAL 2 TIMES DAILY PRN
Qty: 60 TABLET | Refills: 5 | Status: SHIPPED | OUTPATIENT
Start: 2020-10-09 | End: 2020-12-23 | Stop reason: SDUPTHER

## 2020-10-09 RX ORDER — FUROSEMIDE 40 MG/1
40 TABLET ORAL DAILY
Qty: 30 TABLET | Refills: 3 | Status: SHIPPED | OUTPATIENT
Start: 2020-10-09 | End: 2020-12-18

## 2020-10-09 RX ORDER — CLOPIDOGREL BISULFATE 75 MG/1
75 TABLET ORAL DAILY
Qty: 30 TABLET | Refills: 5 | Status: SHIPPED | OUTPATIENT
Start: 2020-10-09 | End: 2020-12-23 | Stop reason: SDUPTHER

## 2020-10-09 RX ORDER — IPRATROPIUM BROMIDE AND ALBUTEROL SULFATE 2.5; .5 MG/3ML; MG/3ML
3 SOLUTION RESPIRATORY (INHALATION) 4 TIMES DAILY
Qty: 360 ML | Refills: 0 | Status: SHIPPED | OUTPATIENT
Start: 2020-10-09 | End: 2020-12-18

## 2020-10-09 RX ORDER — SODIUM POLYSTYRENE SULFONATE 15 G/60ML
SUSPENSION ORAL; RECTAL
Status: CANCELLED | OUTPATIENT
Start: 2020-10-09

## 2020-10-09 RX ORDER — ATORVASTATIN CALCIUM 40 MG/1
40 TABLET, FILM COATED ORAL DAILY
Qty: 90 TABLET | Refills: 0 | Status: SHIPPED | OUTPATIENT
Start: 2020-10-09 | End: 2021-03-26 | Stop reason: SDUPTHER

## 2020-10-09 RX ORDER — CETIRIZINE HYDROCHLORIDE 10 MG/1
10 TABLET ORAL DAILY
Qty: 30 TABLET | Refills: 3 | Status: SHIPPED | OUTPATIENT
Start: 2020-10-09 | End: 2021-03-26 | Stop reason: SDUPTHER

## 2020-10-09 RX ORDER — NITROGLYCERIN 0.4 MG/1
0.4 TABLET SUBLINGUAL AS NEEDED
Qty: 30 TABLET | Refills: 3 | Status: SHIPPED | OUTPATIENT
Start: 2020-10-09 | End: 2021-03-26 | Stop reason: SDUPTHER

## 2020-10-09 RX ORDER — ASPIRIN 81 MG/1
81 TABLET ORAL DAILY
Qty: 31 TABLET | Refills: 6 | Status: ON HOLD
Start: 2020-10-09 | End: 2021-01-13

## 2020-10-09 RX ORDER — ONDANSETRON 4 MG/1
4 TABLET, ORALLY DISINTEGRATING ORAL EVERY 12 HOURS PRN
Qty: 30 TABLET | Refills: 0 | Status: SHIPPED | OUTPATIENT
Start: 2020-10-09 | End: 2020-12-18

## 2020-10-09 RX ORDER — NYSTATIN 100000 [USP'U]/G
POWDER TOPICAL 3 TIMES DAILY
Qty: 15 G | Refills: 1 | Status: SHIPPED | OUTPATIENT
Start: 2020-10-09 | End: 2021-03-26 | Stop reason: SDUPTHER

## 2020-10-09 RX ORDER — ISOSORBIDE MONONITRATE 30 MG/1
30 TABLET, EXTENDED RELEASE ORAL
Qty: 30 TABLET | Refills: 0 | Status: SHIPPED | OUTPATIENT
Start: 2020-10-09 | End: 2020-12-18

## 2020-10-09 RX ORDER — GABAPENTIN 800 MG/1
800 TABLET ORAL 3 TIMES DAILY
Qty: 90 TABLET | Refills: 0 | Status: SHIPPED | OUTPATIENT
Start: 2020-10-09 | End: 2020-12-05

## 2020-10-09 RX ORDER — FERROUS SULFATE 325(65) MG
1 TABLET ORAL
Qty: 30 TABLET | Refills: 3 | Status: SHIPPED | OUTPATIENT
Start: 2020-10-09 | End: 2021-03-26 | Stop reason: SDUPTHER

## 2020-10-09 RX ORDER — NICOTINE 21 MG/24HR
1 PATCH, TRANSDERMAL 24 HOURS TRANSDERMAL EVERY 24 HOURS
Qty: 42 PATCH | Refills: 0 | Status: SHIPPED | OUTPATIENT
Start: 2020-10-09 | End: 2020-12-18

## 2020-10-09 RX ORDER — PANTOPRAZOLE SODIUM 40 MG/1
40 TABLET, DELAYED RELEASE ORAL NIGHTLY
Qty: 30 TABLET | Refills: 5 | Status: SHIPPED | OUTPATIENT
Start: 2020-10-09 | End: 2021-03-26 | Stop reason: SDUPTHER

## 2020-10-09 RX ORDER — PROMETHAZINE HYDROCHLORIDE 25 MG/1
25 TABLET ORAL EVERY 6 HOURS PRN
Qty: 30 TABLET | Refills: 0 | Status: SHIPPED | OUTPATIENT
Start: 2020-10-09 | End: 2021-03-26 | Stop reason: SDUPTHER

## 2020-10-09 RX ORDER — MONTELUKAST SODIUM 10 MG/1
10 TABLET ORAL NIGHTLY
Qty: 30 TABLET | Refills: 5 | Status: SHIPPED | OUTPATIENT
Start: 2020-10-09 | End: 2021-03-26 | Stop reason: SDUPTHER

## 2020-10-09 RX ORDER — LISINOPRIL 10 MG/1
10 TABLET ORAL DAILY
Qty: 30 TABLET | Refills: 5 | Status: SHIPPED | OUTPATIENT
Start: 2020-10-09 | End: 2020-12-23 | Stop reason: SDUPTHER

## 2020-10-09 RX ORDER — NAPROXEN 500 MG/1
500 TABLET ORAL 2 TIMES DAILY WITH MEALS
Qty: 60 TABLET | Refills: 3 | Status: SHIPPED | OUTPATIENT
Start: 2020-10-09 | End: 2020-10-09

## 2020-10-09 RX ORDER — LIDOCAINE 50 MG/G
OINTMENT TOPICAL
Qty: 1 TUBE | Refills: 0 | Status: SHIPPED | OUTPATIENT
Start: 2020-10-09 | End: 2020-12-18

## 2020-10-09 RX ORDER — METOPROLOL TARTRATE 100 MG/1
100 TABLET ORAL EVERY 12 HOURS SCHEDULED
Qty: 60 TABLET | Refills: 0 | Status: SHIPPED | OUTPATIENT
Start: 2020-10-09 | End: 2021-08-16

## 2020-10-09 NOTE — PROGRESS NOTES
Family Medicine Residency  Nirmal Calvillo MD    Subjective:     Yamileth Slater is a 60 y.o. female who presents for hospital follow-up for chest pain.  She was hospitalized from October 5, 2020 until October 6, 2020 due to cardiac history and new onset chest pain.  She has associated neck pain which is where the chest pain originated from.  Cardiac work-up was negative.  She states her chest pain and neck pain have all resolved.  She does continue to have left hip pain that is constant, nonradiating, exacerbated with movement, and has no relieving factors.  She has not tried Tylenol.  Hip x-ray in hospital did not show any bony abnormality.    The following portions of the patient's history were reviewed and updated as appropriate: allergies, current medications, past family history, past medical history, past social history, past surgical history and problem list.    Past Medical Hx:  Past Medical History:   Diagnosis Date   • Anxiety states    • Asthma    • Carotid artery stenosis     DWIGHT 0-15%, LICA 0-15%. LICA stent 5/2014.   • Chronic folliculitis    • Chronic obstructive lung disease (CMS/HCC)    • Coronary arteriosclerosis    • Diabetes mellitus (CMS/HCC)     no retinopathy; A1C 9.1      • Dyslipidemia    • Essential hypertension    • Excoriated eczema     asteosis/keratosis   • GERD (gastroesophageal reflux disease)    • History of colon polyps    • Hypertensive disorder    • Kidney stone    • Mixed hyperlipidemia    • Morbid obesity due to excess calories (CMS/HCC)     BMI 44.5   • Neurologic disorder associated with diabetes mellitus (CMS/HCC)    • Non-healing surgical wound     LLE EVHa   • Peripheral vascular disease (CMS/HCC)    • Sleep apnea    • Tobacco dependence syndrome     1/2ppd      • Type 2 diabetes mellitus (CMS/HCC)    • Vitamin D deficiency        Past Surgical Hx:  Past Surgical History:   Procedure Laterality Date   • CARDIAC CATHETERIZATION  08/09/2013    Severe multivessel  CAD with critical lesions noted in the RCA, obtuse marginal two, ramus intermemdious, and LAD as described above. Normal LV systolic function with no wall motion abnormality.    • CARDIAC CATHETERIZATION  05/27/2014     LEFT Carotid Stent    • CARDIAC CATHETERIZATION N/A 7/14/2020    Procedure: Right Heart Cath;  Surgeon: Eugenio Barragan MD PhD;  Location: [facility] CATH INVASIVE LOCATION;  Service: Cardiology;  Laterality: N/A;   • CAROTID STENT Left 05/27/2014   • COLONOSCOPY  05/02/2016    Normal colon.Diverticulosis in the sigmoid colon.No specimens collected.    • CORONARY ARTERY BYPASS GRAFT  11/15/2013    CABG x 3 with LIMA to LAD and coronary endarterectomy to LAD, SVG to Ramus intermedius branch and SVG to PDA.    • CYSTOSCOPY URETEROSCOPY LASER LITHOTRIPSY     • ENDOSCOPY  09/23/2015     Esophageal stricture present.Normal stomach.Normal duodenum.    • ENDOSCOPY  11/16/2015    w/ dilatation - Esophageal stricture present,Dilatation performed.Normal stomach and duodenum.    • HYSTERECTOMY     • INCISION AND DRAINAGE ABSCESS  01/02/2016    Incision and drainage of right perineal abscess.    • INCISION AND DRAINAGE ABSCESS  02/18/2014    Incision and Drainage of abscess of left lower leg    • OTHER SURGICAL HISTORY  01/25/2016    DESTRUCTION OF BENIGN LESION      • OTHER SURGICAL HISTORY  04/18/2014    ear/neck - L attempted CEA, Neck Dissection    • TUBAL ABDOMINAL LIGATION         Current Meds:    Current Outpatient Medications:   •  albuterol (ACCUNEB) 0.63 MG/3ML nebulizer solution, Take 3 mL by nebulization Every 6 (Six) Hours As Needed for Wheezing., Disp: 20 vial, Rfl: 0  •  albuterol sulfate  (90 Base) MCG/ACT inhaler, Inhale 2 puffs Every 4 (Four) Hours As Needed for Wheezing., Disp: 18 g, Rfl: 1  •  aspirin (aspirin) 81 MG EC tablet, Take 1 tablet by mouth Daily., Disp: 31 tablet, Rfl: 6  •  atorvastatin (LIPITOR) 40 MG tablet, Take 1 tablet by mouth Daily., Disp: 90 tablet, Rfl: 0  •   betamethasone valerate (VALISONE) 0.1 % cream, Apply  topically to the appropriate area as directed Daily., Disp: 45 g, Rfl: 2  •  Blood Glucose Monitoring Suppl ([employer] Blood Glucose Meter) w/Device kit, 1 each 3 (Three) Times a Day., Disp: 1 kit, Rfl: 3  •  cetirizine (zyrTEC) 10 MG tablet, Take 1 tablet by mouth Daily., Disp: 30 tablet, Rfl: 3  •  clopidogrel (PLAVIX) 75 MG tablet, Take 1 tablet by mouth Daily., Disp: 30 tablet, Rfl: 5  •  cyclobenzaprine (FLEXERIL) 10 MG tablet, Take 1 tablet by mouth 2 (Two) Times a Day As Needed for Muscle Spasms., Disp: 60 tablet, Rfl: 5  •  EASY TOUCH PEN NEEDLES 31G X 8 MM misc, , Disp: , Rfl:   •  Empagliflozin (Jardiance) 10 MG tablet, Take 10 mg by mouth Every Morning., Disp: 90 tablet, Rfl: 3  •  ferrous sulfate (FeroSul) 325 (65 FE) MG tablet, Take 1 tablet by mouth Daily With Breakfast., Disp: 30 tablet, Rfl: 3  •  furosemide (LASIX) 40 MG tablet, Take 1 tablet by mouth Daily., Disp: 30 tablet, Rfl: 3  •  gabapentin (NEURONTIN) 800 MG tablet, Take 1 tablet by mouth 3 (Three) Times a Day. For neuropathy, Disp: 90 tablet, Rfl: 0  •  glucose blood test strip, Use as instructed, Disp: 100 each, Rfl: 12  •  Insulin Glargine (Lantus SoloStar) 100 UNIT/ML injection pen, Inject 95 Units under the skin into the appropriate area as directed Every 12 (Twelve) Hours., Disp: 60 mL, Rfl: 11  •  Insulin Pen Needle (NovoFine) 30G X 8 MM misc, As directed 4 times daily, Disp: 100 each, Rfl: 11  •  ipratropium-albuterol (DUO-NEB) 0.5-2.5 mg/3 ml nebulizer, Take 3 mL by nebulization 4 (Four) Times a Day. ICD10 code J96.01, Disp: 360 mL, Rfl: 0  •  isosorbide mononitrate (IMDUR) 30 MG 24 hr tablet, Take 1 tablet by mouth Daily for 30 days., Disp: 30 tablet, Rfl: 0  •  lidocaine (LIDODERM) 5 %, Place 1 patch on the skin as directed by provider Daily. Remove & Discard patch within 12 hours or as directed by MD, Disp: 15 each, Rfl: 0  •  lidocaine (XYLOCAINE) 5 % ointment, Apply  topically to  the appropriate area as directed Every 2 (Two) Hours As Needed for Mild Pain ., Disp: 1 tube, Rfl: 0  •  Liraglutide (Victoza) 18 MG/3ML solution pen-injector injection, Inject 1.2 mg under the skin into the appropriate area as directed Daily., Disp: 3 mL, Rfl: 1  •  lisinopril (PRINIVIL,ZESTRIL) 10 MG tablet, Take 1 tablet by mouth Daily., Disp: 30 tablet, Rfl: 5  •  metFORMIN (GLUCOPHAGE) 1000 MG tablet, Take 1 tablet by mouth 2 (Two) Times a Day With Meals., Disp: 60 tablet, Rfl: 5  •  metoprolol tartrate (LOPRESSOR) 100 MG tablet, Take 1 tablet by mouth Every 12 (Twelve) Hours for 30 days., Disp: 60 tablet, Rfl: 0  •  montelukast (SINGULAIR) 10 MG tablet, Take 1 tablet by mouth Every Night., Disp: 30 tablet, Rfl: 5  •  nicotine (Nicoderm CQ) 21 MG/24HR patch, Place 1 patch on the skin as directed by provider Daily., Disp: 42 patch, Rfl: 0  •  nitroglycerin (NITROSTAT) 0.4 MG SL tablet, Place 1 tablet under the tongue As Needed for Chest Pain. Take no more than 3 doses in 15 minutes., Disp: 30 tablet, Rfl: 3  •  nystatin (MYCOSTATIN) 533503 UNIT/GM powder, Apply  topically to the appropriate area as directed 3 (Three) Times a Day., Disp: 15 g, Rfl: 1  •  ondansetron ODT (Zofran ODT) 4 MG disintegrating tablet, Place 1 tablet on the tongue Every 12 (Twelve) Hours As Needed for Nausea or Vomiting., Disp: 30 tablet, Rfl: 0  •  oxybutynin XL (DITROPAN-XL) 5 MG 24 hr tablet, , Disp: , Rfl:   •  pantoprazole (PROTONIX) 40 MG EC tablet, Take 1 tablet by mouth Every Night., Disp: 30 tablet, Rfl: 5  •  potassium chloride (MICRO-K) 10 MEQ CR capsule, Take 1 capsule by mouth Daily., Disp: 30 capsule, Rfl: 5  •  promethazine (PHENERGAN) 25 MG tablet, Take 1 tablet by mouth Every 6 (Six) Hours As Needed for Nausea or Vomiting., Disp: 30 tablet, Rfl: 0  •  sodium bicarbonate 325 MG tablet, Take 1 tablet by mouth 2 (Two) Times a Day., Disp: 60 tablet, Rfl: 5  •  SPS 15 GM/60ML suspension, , Disp: , Rfl:      Allergies:  Allergies   Allergen Reactions   • Adhesive Tape Hives   • Other      Bandaids, MRSA, SURECLOSE       Family Hx:  Family History   Problem Relation Age of Onset   • Heart disease Mother    • Cancer Mother    • Heart disease Father    • Heart disease Sister    • Cancer Sister    • Heart disease Brother         Social History:  Social History     Socioeconomic History   • Marital status:      Spouse name: wesley dumont   • Number of children: Not on file   • Years of education: Not on file   • Highest education level: Not on file   Tobacco Use   • Smoking status: Current Every Day Smoker     Packs/day: 0.25     Years: 40.00     Pack years: 10.00     Types: Cigarettes   • Smokeless tobacco: Never Used   Substance and Sexual Activity   • Alcohol use: No   • Drug use: Yes     Types: LSD, Marijuana, Methamphetamines     Comment: Lasted used in 2006   • Sexual activity: Not Currently     Partners: Male       Review of Systems  Review of Systems   Constitutional: Negative for activity change and appetite change.   HENT: Negative for congestion and trouble swallowing.    Respiratory: Negative for chest tightness and shortness of breath.    Cardiovascular: Negative for chest pain and palpitations.   Gastrointestinal: Negative for abdominal distention and abdominal pain.   Genitourinary: Negative for difficulty urinating and dysuria.   Musculoskeletal: Positive for arthralgias (Hip pain). Negative for myalgias.   Skin: Negative for color change and pallor.   Neurological: Negative for dizziness, light-headedness and headaches.   Psychiatric/Behavioral: Negative for agitation and behavioral problems.       Objective:     /78   Pulse 62   Temp 97.6 °F (36.4 °C)   LMP  (LMP Unknown)   SpO2 97%   Physical Exam  Constitutional:       General: She is not in acute distress.     Appearance: She is well-developed.   HENT:      Head: Normocephalic and atraumatic.      Right Ear: External ear normal.       Left Ear: External ear normal.      Nose: Nose normal.   Eyes:      Extraocular Movements: Extraocular movements intact.      Pupils: Pupils are equal, round, and reactive to light.   Neck:      Musculoskeletal: Normal range of motion and neck supple.   Cardiovascular:      Rate and Rhythm: Normal rate and regular rhythm.      Heart sounds: Normal heart sounds. No murmur. No friction rub. No gallop.    Pulmonary:      Effort: Pulmonary effort is normal. No respiratory distress.      Breath sounds: Normal breath sounds. No wheezing or rales.   Abdominal:      General: Bowel sounds are normal. There is no distension.      Palpations: Abdomen is soft.      Tenderness: There is no abdominal tenderness.   Musculoskeletal: Normal range of motion.      Right lower leg: No edema.      Left lower leg: No edema.   Skin:     General: Skin is warm.      Capillary Refill: Capillary refill takes less than 2 seconds.      Findings: No erythema.   Neurological:      Mental Status: She is alert and oriented to person, place, and time. Mental status is at baseline.   Psychiatric:         Mood and Affect: Mood normal.         Behavior: Behavior normal.          Assessment/Plan:     Yamileth was seen today for chest pain.    Diagnoses and all orders for this visit:    Hospital discharge follow-up        - Chest and neck pain has resolved.  Should continue to monitor        - Continue to monitor hip pain and try to achieve relief with Tylenol arthritis.  If pain does not improve recommended calling clinic for referral to physical therapy.    Pain  -     Discontinue: naproxen (NAPROSYN) 500 MG tablet; Take 1 tablet by mouth 2 (Two) Times a Day With Meals.    Chronic obstructive pulmonary disease with acute lower respiratory infection (CMS/HCC)  -     montelukast (SINGULAIR) 10 MG tablet; Take 1 tablet by mouth Every Night.  -     ipratropium-albuterol (DUO-NEB) 0.5-2.5 mg/3 ml nebulizer; Take 3 mL by nebulization 4 (Four) Times a Day. ICD10  code J96.01    Uncontrolled type 2 diabetes mellitus with diabetic polyneuropathy, with long-term current use of insulin (CMS/[facility])  -     metFORMIN (GLUCOPHAGE) 1000 MG tablet; Take 1 tablet by mouth 2 (Two) Times a Day With Meals.  -     gabapentin (NEURONTIN) 800 MG tablet; Take 1 tablet by mouth 3 (Three) Times a Day. For neuropathy  - Mamadou reviewed and appropriate, request # [id number]  -     glucose blood test strip; Use as instructed    Mild persistent asthma without complication  -     ipratropium-albuterol (DUO-NEB) 0.5-2.5 mg/3 ml nebulizer; Take 3 mL by nebulization 4 (Four) Times a Day. ICD10 code J96.01    Diabetes mellitus type 2 in obese (CMS/[facility])  -     lisinopril (PRINIVIL,ZESTRIL) 10 MG tablet; Take 1 tablet by mouth Daily.  -     Blood Glucose Monitoring Suppl ([employer] Blood Glucose Meter) w/Device kit; 1 each 3 (Three) Times a Day.    Other iron deficiency anemia  -     ferrous sulfate (FeroSul) 325 (65 FE) MG tablet; Take 1 tablet by mouth Daily With Breakfast.    Other orders  -     sodium bicarbonate 325 MG tablet; Take 1 tablet by mouth 2 (Two) Times a Day.  -     promethazine (PHENERGAN) 25 MG tablet; Take 1 tablet by mouth Every 6 (Six) Hours As Needed for Nausea or Vomiting.  -     potassium chloride (MICRO-K) 10 MEQ CR capsule; Take 1 capsule by mouth Daily.  -     pantoprazole (PROTONIX) 40 MG EC tablet; Take 1 tablet by mouth Every Night.  -     ondansetron ODT (Zofran ODT) 4 MG disintegrating tablet; Place 1 tablet on the tongue Every 12 (Twelve) Hours As Needed for Nausea or Vomiting.  -     nystatin (MYCOSTATIN) 628320 UNIT/GM powder; Apply  topically to the appropriate area as directed 3 (Three) Times a Day.  -     nitroglycerin (NITROSTAT) 0.4 MG SL tablet; Place 1 tablet under the tongue As Needed for Chest Pain. Take no more than 3 doses in 15 minutes.  -     nicotine (Nicoderm CQ) 21 MG/24HR patch; Place 1 patch on the skin as directed by provider Daily.  -     metoprolol tartrate  (LOPRESSOR) 100 MG tablet; Take 1 tablet by mouth Every 12 (Twelve) Hours for 30 days.  -     lidocaine (LIDODERM) 5 %; Place 1 patch on the skin as directed by provider Daily. Remove & Discard patch within 12 hours or as directed by MD  -     lidocaine (XYLOCAINE) 5 % ointment; Apply  topically to the appropriate area as directed Every 2 (Two) Hours As Needed for Mild Pain .  -     albuterol (ACCUNEB) 0.63 MG/3ML nebulizer solution; Take 3 mL by nebulization Every 6 (Six) Hours As Needed for Wheezing.  -     aspirin (aspirin) 81 MG EC tablet; Take 1 tablet by mouth Daily.  -     cyclobenzaprine (FLEXERIL) 10 MG tablet; Take 1 tablet by mouth 2 (Two) Times a Day As Needed for Muscle Spasms.  -     atorvastatin (LIPITOR) 40 MG tablet; Take 1 tablet by mouth Daily.  -     cetirizine (zyrTEC) 10 MG tablet; Take 1 tablet by mouth Daily.  -     betamethasone valerate (VALISONE) 0.1 % cream; Apply  topically to the appropriate area as directed Daily.  -     isosorbide mononitrate (IMDUR) 30 MG 24 hr tablet; Take 1 tablet by mouth Daily for 30 days.  -     furosemide (LASIX) 40 MG tablet; Take 1 tablet by mouth Daily.  -     clopidogrel (PLAVIX) 75 MG tablet; Take 1 tablet by mouth Daily.        · Rx changes: None  · Patient Education: Rest hip, and take Tylenol for arthritis for possible relief  · Compliance at present is estimated to be excellent.   · Efforts to improve compliance (if necessary) will be directed at Unnecessary at this time.     Follow-up:     Return for Next scheduled follow up.    Preventative:  Health Maintenance   Topic Date Due   • ZOSTER VACCINE (1 of 2) 03/10/2009   • ANNUAL WELLNESS VISIT  08/24/2016   • DIABETIC EYE EXAM  08/24/2016   • URINE MICROALBUMIN  10/05/2018   • DIABETIC FOOT EXAM  12/26/2019   • PAP SMEAR  01/04/2020   • LUNG CANCER SCREENING  01/10/2021   • HEMOGLOBIN A1C  03/02/2021   • COLONOSCOPY  05/02/2021   • LIPID PANEL  06/24/2021   • MAMMOGRAM  01/10/2022   • TDAP/TD VACCINES  (3 - Td) 11/10/2024   • HEPATITIS C SCREENING  Completed   • Pneumococcal Vaccine 0-64  Completed   • INFLUENZA VACCINE  Completed       Recommended: none  Vaccine Counseling: N/A    Weight  -Class: Obese Class III extreme obesity: > or equal to 40kg/m2  -Patient's There is no height or weight on file to calculate BMI. BMI is above normal parameters. Recommendations include: exercise counseling and nutrition counseling.   eat more fruits and vegetables, decrease soda or juice intake, increase water intake and increase physical activity    Alcohol use:  reports no history of alcohol use.  Nicotine status  reports that she has been smoking cigarettes. She has a 10.00 pack-year smoking history. She has never used smokeless tobacco.    Goals     • Fasting Blood Glucose       Barriers: compliance with diet      • Quit smoking / using tobacco           RISK SCORE: 4          This document has been electronically signed by Nirmal Calvillo MD on October 9, 2020 11:16 CDT

## 2020-10-09 NOTE — TELEPHONE ENCOUNTER
MARCIA FROM [facility] CALLED AND IS WONDERING SINCE PATIENT WAS SEEN BY DR LOPEZ THIS MORNING SHE WAS WONDERING IF HOME HEALTH NEEDS TO GO BY AND CHECK ON HER IN THE MORNING? SHE IS A JOHN PATIENT AND HE IS ON NIGHT FLOAT. HER NUMBER TO CALL BACK [phone].          THANK YOU,      RAINER

## 2020-10-12 ENCOUNTER — READMISSION MANAGEMENT (OUTPATIENT)
Dept: CALL CENTER | Facility: HOSPITAL | Age: 60
End: 2020-10-12

## 2020-10-12 NOTE — PROGRESS NOTES
I have reviewed the notes, assessments, and/or procedures performed by Dr. Calvillo, I concur with her/his documentation and assessment and plan for Yamileth Slater.       This document has been electronically signed by Kit Brar MD on October 12, 2020 13:38 CDT

## 2020-10-12 NOTE — OUTREACH NOTE
Medical Week 1 Survey      Responses   [facility] patient discharged from?  [place]   Does the patient have one of the following disease processes/diagnoses(primary or secondary)?  Other   Week 1 attempt successful?  No          Melisa Rivera RN

## 2020-10-19 ENCOUNTER — READMISSION MANAGEMENT (OUTPATIENT)
Dept: CALL CENTER | Facility: HOSPITAL | Age: 60
End: 2020-10-19

## 2020-10-19 NOTE — OUTREACH NOTE
Medical Week 2 Survey      Responses   [facility] patient discharged from?  [place]   Does the patient have one of the following disease processes/diagnoses(primary or secondary)?  Other   Week 2 attempt successful?  No   Unsuccessful attempts  Attempt 1          Jaja Sawyer RN

## 2020-10-23 ENCOUNTER — READMISSION MANAGEMENT (OUTPATIENT)
Dept: CALL CENTER | Facility: HOSPITAL | Age: 60
End: 2020-10-23

## 2020-10-23 NOTE — OUTREACH NOTE
Medical Week 2 Survey      Responses   [facility] patient discharged from?  [place]   Does the patient have one of the following disease processes/diagnoses(primary or secondary)?  Other   Week 2 attempt successful?  No   Unsuccessful attempts  Attempt 2          Dayna Schuster RN

## 2020-10-28 RX ORDER — LIRAGLUTIDE 6 MG/ML
INJECTION SUBCUTANEOUS
Qty: 6 ML | Refills: 1 | Status: SHIPPED | OUTPATIENT
Start: 2020-10-28 | End: 2020-12-22 | Stop reason: HOSPADM

## 2020-10-28 RX ORDER — ALBUTEROL SULFATE 90 UG/1
2 AEROSOL, METERED RESPIRATORY (INHALATION) EVERY 4 HOURS PRN
Qty: 18 G | Refills: 1 | Status: SHIPPED | OUTPATIENT
Start: 2020-10-28 | End: 2020-12-23 | Stop reason: SDUPTHER

## 2020-10-29 ENCOUNTER — READMISSION MANAGEMENT (OUTPATIENT)
Dept: CALL CENTER | Facility: HOSPITAL | Age: 60
End: 2020-10-29

## 2020-10-29 NOTE — OUTREACH NOTE
Medical Week 3 Survey      Responses   [facility] patient discharged from?  [place]   Does the patient have one of the following disease processes/diagnoses(primary or secondary)?  Other   Week 3 attempt successful?  No   Unsuccessful attempts  Attempt 1          Esau Cheney RN

## 2020-10-30 ENCOUNTER — READMISSION MANAGEMENT (OUTPATIENT)
Dept: CALL CENTER | Facility: HOSPITAL | Age: 60
End: 2020-10-30

## 2020-10-30 NOTE — OUTREACH NOTE
Medical Week 3 Survey      Responses   [facility] patient discharged from?  [place]   Does the patient have one of the following disease processes/diagnoses(primary or secondary)?  Other   Week 3 attempt successful?  No   Unsuccessful attempts  Attempt 2          Marybel Hsu RN

## 2020-11-09 DIAGNOSIS — IMO0002 UNCONTROLLED TYPE 2 DIABETES MELLITUS WITH DIABETIC POLYNEUROPATHY, WITH LONG-TERM CURRENT USE OF INSULIN: ICD-10-CM

## 2020-11-12 RX ORDER — GABAPENTIN 800 MG/1
800 TABLET ORAL 3 TIMES DAILY
Qty: 90 TABLET | Refills: 0 | OUTPATIENT
Start: 2020-11-12

## 2020-11-17 RX ORDER — LIDOCAINE 50 MG/G
1 PATCH TOPICAL
Qty: 30 PATCH | Refills: 3 | Status: SHIPPED | OUTPATIENT
Start: 2020-11-17 | End: 2020-12-18

## 2020-12-04 ENCOUNTER — LAB (OUTPATIENT)
Dept: LAB | Facility: HOSPITAL | Age: 60
End: 2020-12-04

## 2020-12-04 ENCOUNTER — OFFICE VISIT (OUTPATIENT)
Dept: FAMILY MEDICINE CLINIC | Facility: CLINIC | Age: 60
End: 2020-12-04

## 2020-12-04 VITALS
BODY MASS INDEX: 52.63 KG/M2 | HEIGHT: 62 IN | OXYGEN SATURATION: 97 % | SYSTOLIC BLOOD PRESSURE: 126 MMHG | TEMPERATURE: 97.1 F | DIASTOLIC BLOOD PRESSURE: 82 MMHG | WEIGHT: 286 LBS | HEART RATE: 82 BPM

## 2020-12-04 DIAGNOSIS — M54.50 ACUTE BILATERAL LOW BACK PAIN WITHOUT SCIATICA: ICD-10-CM

## 2020-12-04 DIAGNOSIS — E11.42 TYPE 2 DIABETES MELLITUS WITH DIABETIC POLYNEUROPATHY, WITH LONG-TERM CURRENT USE OF INSULIN (HCC): ICD-10-CM

## 2020-12-04 DIAGNOSIS — E11.42 TYPE 2 DIABETES MELLITUS WITH DIABETIC POLYNEUROPATHY, WITH LONG-TERM CURRENT USE OF INSULIN (HCC): Primary | ICD-10-CM

## 2020-12-04 DIAGNOSIS — E87.5 HYPERKALEMIA: ICD-10-CM

## 2020-12-04 DIAGNOSIS — Z79.4 TYPE 2 DIABETES MELLITUS WITH DIABETIC POLYNEUROPATHY, WITH LONG-TERM CURRENT USE OF INSULIN (HCC): ICD-10-CM

## 2020-12-04 DIAGNOSIS — L02.91 ABSCESS: ICD-10-CM

## 2020-12-04 DIAGNOSIS — Z79.4 TYPE 2 DIABETES MELLITUS WITH DIABETIC POLYNEUROPATHY, WITH LONG-TERM CURRENT USE OF INSULIN (HCC): Primary | ICD-10-CM

## 2020-12-04 DIAGNOSIS — R22.31 LOCALIZED SWELLING ON RIGHT HAND: ICD-10-CM

## 2020-12-04 LAB
ALBUMIN SERPL-MCNC: 3.9 G/DL (ref 3.5–5.2)
ALBUMIN/GLOB SERPL: 1.1 G/DL
ALP SERPL-CCNC: 187 U/L (ref 39–117)
ALT SERPL W P-5'-P-CCNC: 27 U/L (ref 1–33)
ANION GAP SERPL CALCULATED.3IONS-SCNC: 7.8 MMOL/L (ref 5–15)
AST SERPL-CCNC: 37 U/L (ref 1–32)
BILIRUB SERPL-MCNC: 0.2 MG/DL (ref 0–1.2)
BUN SERPL-MCNC: 36 MG/DL (ref 8–23)
BUN/CREAT SERPL: 20 (ref 7–25)
CALCIUM SPEC-SCNC: 9 MG/DL (ref 8.6–10.5)
CHLORIDE SERPL-SCNC: 99 MMOL/L (ref 98–107)
CO2 SERPL-SCNC: 24.2 MMOL/L (ref 22–29)
CREAT SERPL-MCNC: 1.8 MG/DL (ref 0.57–1)
GFR SERPL CREATININE-BSD FRML MDRD: 29 ML/MIN/1.73
GLOBULIN UR ELPH-MCNC: 3.7 GM/DL
GLUCOSE SERPL-MCNC: 182 MG/DL (ref 65–99)
HBA1C MFR BLD: 9.5 % (ref 4.8–5.6)
POTASSIUM SERPL-SCNC: 5.1 MMOL/L (ref 3.5–5.2)
PROT SERPL-MCNC: 7.6 G/DL (ref 6–8.5)
SODIUM SERPL-SCNC: 131 MMOL/L (ref 136–145)

## 2020-12-04 PROCEDURE — 80053 COMPREHEN METABOLIC PANEL: CPT

## 2020-12-04 PROCEDURE — 99213 OFFICE O/P EST LOW 20 MIN: CPT | Performed by: STUDENT IN AN ORGANIZED HEALTH CARE EDUCATION/TRAINING PROGRAM

## 2020-12-04 PROCEDURE — 36415 COLL VENOUS BLD VENIPUNCTURE: CPT

## 2020-12-04 PROCEDURE — 83036 HEMOGLOBIN GLYCOSYLATED A1C: CPT

## 2020-12-05 RX ORDER — GABAPENTIN 800 MG/1
800 TABLET ORAL 3 TIMES DAILY
Qty: 90 TABLET | Refills: 0 | Status: SHIPPED | OUTPATIENT
Start: 2020-12-05 | End: 2021-01-08

## 2020-12-05 NOTE — PROGRESS NOTES
Family Medicine Residency  Nirmal Calvillo MD    Subjective:     Yamileth Slater is a 60 y.o. female who presents for follow-up for DM.  She will also discuss right hand swelling, and genital abscesses.    DM  Is a chronic problem.  Currently taking Jardiance 10 mg every morning, insulin glargine 95 units twice a day, and Victoza 1.2mg daily.  States blood sugars have been above 200 daily.  No episodes of hypoglycemia states eats rice pasta wheat bread beef pork and chicken.  Did have one episode of chest pain with a sugar of 525 and 2 nitroglycerin tablets improved the chest pain.  She was taken off her Metformin by nephrology due to worsening renal function.    Approve for  Insulin pump and or Continuous Glucose Sensor     #1  Patient has diabetes mellitus, insulin-dependent.    #2 She performs blood glucose testing at least 4 times daily and blood glucose log was brought to office with variability from .    #3  She is requiring  Basal insulin  and Prandial Insulin for a total of at least  4 injections or boluses per day and has been doing this for at least 6 months     #4 Patient tests blood sugars at least 4 times daily and makes frequent self-adjustments based on information provided by fingerstick and patient is injecting insulin at least 4 times daily. She has been doing this for more than 6 months . She tests frequently due to hypoglycemia and hyperglycemia.   She has frequent variability with activity     #5 I have personally seen patient within the past 6 months    #6 We plan on seeing her every 2-3 months for continuous adjustment of her diabetes regimen     #7 patient has hypoglycemia with episodes of unawareness.    #8 patient has day-to-day variation in her mealtime which confounds the degree of insulin dosing with multiple daily injections.    #9 patient has completed diabetes education program with us.    #10 she has demonstrated the ability to self monitor her glucose.        #11  Patient is motivated in improving  diabetes control     Back pain  She says she is having kidney pain daily which described as a sharp pain in any position but is improved with changes in position.  Pain returns after being in a new position for extended period of time.  It is worse when she is on her feet.  It hurts in the morning.  She has tried Tylenol with no relief    Right hand swelling  She has right hand swelling for the last week which is intermittent no associated trigger, it is also sore to the touch.    Genital abscesses  She states has a history of I&D of genital abscesses even to the point of admission and antibiotics.  She is continuing to have purulent pustules in the general area with removal of giant blackheads after resolution of pustules by .    The following portions of the patient's history were reviewed and updated as appropriate: allergies, current medications, past family history, past medical history, past social history, past surgical history and problem list.    Past Medical Hx:  Past Medical History:   Diagnosis Date   • Anxiety states    • Asthma    • Carotid artery stenosis     DWIGHT 0-15%, LICA 0-15%. LICA stent 5/2014.   • Chronic folliculitis    • Chronic obstructive lung disease (CMS/HCC)    • Coronary arteriosclerosis    • Diabetes mellitus (CMS/[facility])     no retinopathy; A1C 9.1      • Dyslipidemia    • Essential hypertension    • Excoriated eczema     asteosis/keratosis   • GERD (gastroesophageal reflux disease)    • History of colon polyps    • Hypertensive disorder    • Kidney stone    • Mixed hyperlipidemia    • Morbid obesity due to excess calories (CMS/[facility])     BMI 44.5   • Neurologic disorder associated with diabetes mellitus (CMS/[facility])    • Non-healing surgical wound     LLE EVHa   • Peripheral vascular disease (CMS/[facility])    • Sleep apnea    • Tobacco dependence syndrome     1/2ppd      • Type 2 diabetes mellitus (CMS/[facility])    • Vitamin D deficiency        Past Surgical  Hx:  Past Surgical History:   Procedure Laterality Date   • CARDIAC CATHETERIZATION  08/09/2013    Severe multivessel CAD with critical lesions noted in the RCA, obtuse marginal two, ramus intermemdious, and LAD as described above. Normal LV systolic function with no wall motion abnormality.    • CARDIAC CATHETERIZATION  05/27/2014     LEFT Carotid Stent    • CARDIAC CATHETERIZATION N/A 7/14/2020    Procedure: Right Heart Cath;  Surgeon: Eugenio Barragan MD PhD;  Location: [facility] CATH INVASIVE LOC[facility];  Service: Cardiology;  Laterality: N/A;   • CAROTID STENT Left 05/27/2014   • COLONOSCOPY  05/02/2016    Normal colon.Diverticulosis in the sigmoid colon.No specimens collected.    • CORONARY ARTERY BYPASS GRAFT  11/15/2013    CABG x 3 with LIMA to LAD and coronary endarterectomy to LAD, SVG to Ramus intermedius branch and SVG to PDA.    • CYSTOSCOPY URETEROSCOPY LASER LITHOTRIPSY     • ENDOSCOPY  09/23/2015     Esophageal stricture present.Normal stomach.Normal duodenum.    • ENDOSCOPY  11/16/2015    w/ dilatation - Esophageal stricture present,Dilatation performed.Normal stomach and duodenum.    • HYSTERECTOMY     • INCISION AND DRAINAGE ABSCESS  01/02/2016    Incision and drainage of right perineal abscess.    • INCISION AND DRAINAGE ABSCESS  02/18/2014    Incision and Drainage of abscess of left lower leg    • OTHER SURGICAL HISTORY  01/25/2016    DESTRUCTION OF BENIGN LESION      • OTHER SURGICAL HISTORY  04/18/2014    ear/neck - L attempted CEA, Neck Dissection    • TUBAL ABDOMINAL LIGATION         Current Meds:    Current Outpatient Medications:   •  albuterol (ACCUNEB) 0.63 MG/3ML nebulizer solution, Take 3 mL by nebulization Every 6 (Six) Hours As Needed for Wheezing., Disp: 20 vial, Rfl: 0  •  albuterol sulfate  (90 Base) MCG/ACT inhaler, INHALE 2 PUFFS EVERY 4 (FOUR) HOURS AS NEEDED FOR WHEEZING., Disp: 18 g, Rfl: 1  •  aspirin (aspirin) 81 MG EC tablet, Take 1 tablet by mouth Daily., Disp:  31 tablet, Rfl: 6  •  atorvastatin (LIPITOR) 40 MG tablet, Take 1 tablet by mouth Daily., Disp: 90 tablet, Rfl: 0  •  betamethasone valerate (VALISONE) 0.1 % cream, Apply  topically to the appropriate area as directed Daily., Disp: 45 g, Rfl: 2  •  Blood Glucose Monitoring Suppl ([employer] Blood Glucose Meter) w/Device kit, 1 each 3 (Three) Times a Day., Disp: 1 kit, Rfl: 3  •  cetirizine (zyrTEC) 10 MG tablet, Take 1 tablet by mouth Daily., Disp: 30 tablet, Rfl: 3  •  clopidogrel (PLAVIX) 75 MG tablet, Take 1 tablet by mouth Daily., Disp: 30 tablet, Rfl: 5  •  cyclobenzaprine (FLEXERIL) 10 MG tablet, Take 1 tablet by mouth 2 (Two) Times a Day As Needed for Muscle Spasms., Disp: 60 tablet, Rfl: 5  •  EASY TOUCH PEN NEEDLES 31G X 8 MM misc, , Disp: , Rfl:   •  Empagliflozin (Jardiance) 10 MG tablet, Take 10 mg by mouth Every Morning., Disp: 90 tablet, Rfl: 3  •  ferrous sulfate (FeroSul) 325 (65 FE) MG tablet, Take 1 tablet by mouth Daily With Breakfast., Disp: 30 tablet, Rfl: 3  •  furosemide (LASIX) 40 MG tablet, Take 1 tablet by mouth Daily., Disp: 30 tablet, Rfl: 3  •  gabapentin (NEURONTIN) 800 MG tablet, Take 1 tablet by mouth 3 (Three) Times a Day. For neuropathy, Disp: 90 tablet, Rfl: 0  •  glucose blood test strip, Use as instructed, Disp: 100 each, Rfl: 12  •  Insulin Glargine (Lantus SoloStar) 100 UNIT/ML injection pen, Inject 95 Units under the skin into the appropriate area as directed Every 12 (Twelve) Hours., Disp: 60 mL, Rfl: 11  •  Insulin Pen Needle (NovoFine) 30G X 8 MM misc, As directed 4 times daily, Disp: 100 each, Rfl: 11  •  ipratropium-albuterol (DUO-NEB) 0.5-2.5 mg/3 ml nebulizer, Take 3 mL by nebulization 4 (Four) Times a Day. ICD10 code J96.01, Disp: 360 mL, Rfl: 0  •  lidocaine (LIDODERM) 5 %, PLACE 1 PATCH ON THE SKIN AS DIRECTED BY PROVIDER DAILY. REMOVE & DISCARD PATCH WITHIN 12 HOURS OR AS DIRECTED BY MD, Disp: 30 patch, Rfl: 3  •  lidocaine (XYLOCAINE) 5 % ointment, Apply  topically to the  appropriate area as directed Every 2 (Two) Hours As Needed for Mild Pain ., Disp: 1 tube, Rfl: 0  •  lisinopril (PRINIVIL,ZESTRIL) 10 MG tablet, Take 1 tablet by mouth Daily., Disp: 30 tablet, Rfl: 5  •  montelukast (SINGULAIR) 10 MG tablet, Take 1 tablet by mouth Every Night., Disp: 30 tablet, Rfl: 5  •  nicotine (Nicoderm CQ) 21 MG/24HR patch, Place 1 patch on the skin as directed by provider Daily., Disp: 42 patch, Rfl: 0  •  nitroglycerin (NITROSTAT) 0.4 MG SL tablet, Place 1 tablet under the tongue As Needed for Chest Pain. Take no more than 3 doses in 15 minutes., Disp: 30 tablet, Rfl: 3  •  nystatin (MYCOSTATIN) 743217 UNIT/GM powder, Apply  topically to the appropriate area as directed 3 (Three) Times a Day., Disp: 15 g, Rfl: 1  •  ondansetron ODT (Zofran ODT) 4 MG disintegrating tablet, Place 1 tablet on the tongue Every 12 (Twelve) Hours As Needed for Nausea or Vomiting., Disp: 30 tablet, Rfl: 0  •  oxybutynin XL (DITROPAN-XL) 5 MG 24 hr tablet, , Disp: , Rfl:   •  pantoprazole (PROTONIX) 40 MG EC tablet, Take 1 tablet by mouth Every Night., Disp: 30 tablet, Rfl: 5  •  potassium chloride (MICRO-K) 10 MEQ CR capsule, Take 1 capsule by mouth Daily., Disp: 30 capsule, Rfl: 5  •  promethazine (PHENERGAN) 25 MG tablet, Take 1 tablet by mouth Every 6 (Six) Hours As Needed for Nausea or Vomiting., Disp: 30 tablet, Rfl: 0  •  sodium bicarbonate 325 MG tablet, Take 1 tablet by mouth 2 (Two) Times a Day., Disp: 60 tablet, Rfl: 5  •  SPS 15 GM/60ML suspension, , Disp: , Rfl:   •  Victoza 18 MG/3ML solution pen-injector injection, INJECT 1.2 MG UNDER THE SKIN INTO THE APPROPRIATE AREA AS DIRECTED DAILY., Disp: 6 mL, Rfl: 1  •  isosorbide mononitrate (IMDUR) 30 MG 24 hr tablet, Take 1 tablet by mouth Daily for 30 days., Disp: 30 tablet, Rfl: 0  •  metoprolol tartrate (LOPRESSOR) 100 MG tablet, Take 1 tablet by mouth Every 12 (Twelve) Hours for 30 days., Disp: 60 tablet, Rfl: 0    Allergies:  Allergies   Allergen  "Reactions   • Adhesive Tape Hives   • Other      Bandaids, MRSA, SURECLOSE       Family Hx:  Family History   Problem Relation Age of Onset   • Heart disease Mother    • Cancer Mother    • Heart disease Father    • Heart disease Sister    • Cancer Sister    • Heart disease Brother         Social History:  Social History     Socioeconomic History   • Marital status:      Spouse name: wesley dumont   • Number of children: Not on file   • Years of education: Not on file   • Highest education level: Not on file   Tobacco Use   • Smoking status: Current Every Day Smoker     Packs/day: 0.25     Years: 40.00     Pack years: 10.00     Types: Cigarettes   • Smokeless tobacco: Never Used   Substance and Sexual Activity   • Alcohol use: No   • Drug use: Yes     Types: LSD, Marijuana, Methamphetamines     Comment: Lasted used in 2006   • Sexual activity: Not Currently     Partners: Male       Review of Systems  Review of Systems   Constitutional: Negative for activity change and appetite change.   HENT: Negative for congestion and trouble swallowing.    Respiratory: Negative for chest tightness and shortness of breath.    Cardiovascular: Negative for chest pain and palpitations.   Gastrointestinal: Negative for abdominal distention and abdominal pain.   Genitourinary: Negative for difficulty urinating and dysuria.        Genital abscesses   Musculoskeletal: Positive for arthralgias and myalgias.        Right hand swelling and tenderness   Skin: Negative for color change and pallor.   Neurological: Negative for dizziness, light-headedness and headaches.   Psychiatric/Behavioral: Negative for agitation and behavioral problems.       Objective:     /82   Pulse 82   Temp 97.1 °F (36.2 °C)   Ht 157.5 cm (62\")   Wt 130 kg (286 lb)   LMP  (LMP Unknown)   SpO2 97%   BMI 52.31 kg/m²   Physical Exam  Constitutional:       General: She is not in acute distress.     Appearance: She is well-developed.   HENT:      Head: " Normocephalic and atraumatic.      Right Ear: External ear normal.      Left Ear: External ear normal.      Nose: Nose normal.   Eyes:      Extraocular Movements: Extraocular movements intact.      Pupils: Pupils are equal, round, and reactive to light.   Neck:      Musculoskeletal: Normal range of motion and neck supple.   Cardiovascular:      Rate and Rhythm: Normal rate and regular rhythm.      Heart sounds: Normal heart sounds. No murmur. No friction rub. No gallop.    Pulmonary:      Effort: Pulmonary effort is normal. No respiratory distress.      Breath sounds: Normal breath sounds. No wheezing or rales.   Abdominal:      General: Bowel sounds are normal. There is no distension.      Palpations: Abdomen is soft.      Tenderness: There is no abdominal tenderness.   Genitourinary:     Comments: Deferred examination of genital abscesses  Musculoskeletal: Normal range of motion.      Right lower leg: No edema.      Left lower leg: No edema.      Comments: Mild swelling over dorsum of right hand very similar to the dorsum of left hand.  Tenderness to palpation.  Normal range of motion.   Skin:     General: Skin is warm.      Findings: No erythema.   Neurological:      Mental Status: She is alert and oriented to person, place, and time. Mental status is at baseline.   Psychiatric:         Mood and Affect: Mood normal.         Behavior: Behavior normal.          Assessment/Plan:     Diagnoses and all orders for this visit:    1. Type 2 diabetes mellitus with diabetic polyneuropathy, with long-term current use of insulin (CMS/[facility]) (Primary)  -     Comprehensive metabolic panel; Future  -     Hemoglobin A1c; Future.  Will consider adding extra agent since Metformin was discontinued after results.  - Continue to monitor and record blood sugars.  Improve diet.  Try exercise  - Diabetic eye exam    2. Acute bilateral low back pain without sciatica        - Continue to monitor pain.  Can use Tylenol arthritis to  attenuate    3. Localized swelling on right hand        - Continue to monitor    4. Abscess        - Keep area clean, control sugar, and continue to monitor      · Rx changes: None  · Patient Education: Continue to monitor blood glucose and record.  Keep general area clean to prevent infection.  Continue to monitor right hand swelling for triggers worsening, or improvement.  Continue to monitor back pain.  · Compliance at present is estimated to be good.   · Efforts to improve compliance (if necessary) will be directed at Unnecessary at this time.     Follow-up:     Return in about 2 weeks (around 12/18/2020) for Follow-up on chronic problems.    Preventative:  Health Maintenance   Topic Date Due   • ZOSTER VACCINE (1 of 2) 03/10/2009   • ANNUAL WELLNESS VISIT  08/24/2016   • DIABETIC EYE EXAM  08/24/2016   • URINE MICROALBUMIN  10/05/2018   • DIABETIC FOOT EXAM  12/26/2019   • PAP SMEAR  01/04/2020   • LUNG CANCER SCREENING  01/10/2021   • HEMOGLOBIN A1C  03/02/2021   • COLONOSCOPY  05/02/2021   • LIPID PANEL  06/24/2021   • MAMMOGRAM  01/10/2022   • TDAP/TD VACCINES (3 - Td) 11/10/2024   • HEPATITIS C SCREENING  Completed   • Pneumococcal Vaccine 0-64  Completed   • INFLUENZA VACCINE  Completed       Recommended: none  Vaccine Counseling: N/A    Weight  -Class: Obese Class III extreme obesity: > or equal to 40kg/m2  -Patient's Body mass index is 52.31 kg/m². BMI is above normal parameters. Recommendations include: exercise counseling and nutrition counseling.   eat more fruits and vegetables, decrease soda or juice intake, increase water intake, increase physical activity, reduce screen time, reduce portion size, cut out extra servings and reduce fast food intake    Alcohol use:  reports no history of alcohol use.  Nicotine status  reports that she has been smoking cigarettes. She has a 10.00 pack-year smoking history. She has never used smokeless tobacco.    Goals     • Fasting Blood Glucose       Barriers:  compliance with diet      • Quit smoking / using tobacco           RISK SCORE: 5          This document has been electronically signed by Nirmal Calvillo MD on December 4, 2020 21:57 CST

## 2020-12-05 NOTE — TELEPHONE ENCOUNTER
Mamadou reviewed and appropriate, request# [id number]. UDS is up to date. Gabapentin 800mg TID refilled.          This document has been electronically signed by Nirmal Calvillo MD on December 5, 2020 02:20 CST    \

## 2020-12-17 NOTE — PROGRESS NOTES
I have reviewed the notes, assessments, and/or procedures performed by *Nirmal Calvillo MD  **during office visit. I concur with her/his documentation and assessment and plan for Yamileth Slater.          This document has been electronically signed by Girish Snow MD on December 17, 2020 09:59 CST

## 2020-12-18 ENCOUNTER — APPOINTMENT (OUTPATIENT)
Dept: CT IMAGING | Facility: HOSPITAL | Age: 60
End: 2020-12-18

## 2020-12-18 ENCOUNTER — CONSULT (OUTPATIENT)
Dept: SLEEP MEDICINE | Facility: HOSPITAL | Age: 60
End: 2020-12-18

## 2020-12-18 ENCOUNTER — HOSPITAL ENCOUNTER (INPATIENT)
Facility: HOSPITAL | Age: 60
LOS: 4 days | Discharge: HOME | End: 2020-12-22
Attending: FAMILY MEDICINE | Admitting: FAMILY MEDICINE

## 2020-12-18 VITALS
HEART RATE: 82 BPM | BODY MASS INDEX: 52.63 KG/M2 | HEIGHT: 62 IN | DIASTOLIC BLOOD PRESSURE: 92 MMHG | OXYGEN SATURATION: 95 % | SYSTOLIC BLOOD PRESSURE: 144 MMHG | WEIGHT: 286 LBS

## 2020-12-18 DIAGNOSIS — E11.42 TYPE 2 DIABETES MELLITUS WITH DIABETIC POLYNEUROPATHY, WITH LONG-TERM CURRENT USE OF INSULIN (HCC): ICD-10-CM

## 2020-12-18 DIAGNOSIS — G47.33 OSA AND COPD OVERLAP SYNDROME (HCC): ICD-10-CM

## 2020-12-18 DIAGNOSIS — Z79.4 TYPE 2 DIABETES MELLITUS WITH DIABETIC POLYNEUROPATHY, WITH LONG-TERM CURRENT USE OF INSULIN (HCC): ICD-10-CM

## 2020-12-18 DIAGNOSIS — J44.9 OSA AND COPD OVERLAP SYNDROME (HCC): ICD-10-CM

## 2020-12-18 DIAGNOSIS — G47.33 OBSTRUCTIVE SLEEP APNEA, ADULT: Primary | ICD-10-CM

## 2020-12-18 DIAGNOSIS — K85.90 ACUTE PANCREATITIS, UNSPECIFIED COMPLICATION STATUS, UNSPECIFIED PANCREATITIS TYPE: Primary | ICD-10-CM

## 2020-12-18 DIAGNOSIS — G25.81 RESTLESS LEGS SYNDROME (RLS): ICD-10-CM

## 2020-12-18 LAB
ALBUMIN SERPL-MCNC: 3.7 G/DL (ref 3.5–5.2)
ALBUMIN/GLOB SERPL: 0.9 G/DL
ALP SERPL-CCNC: 171 U/L (ref 39–117)
ALT SERPL W P-5'-P-CCNC: 19 U/L (ref 1–33)
ANION GAP SERPL CALCULATED.3IONS-SCNC: 12 MMOL/L (ref 5–15)
AST SERPL-CCNC: 32 U/L (ref 1–32)
BACTERIA UR QL AUTO: ABNORMAL /HPF
BASOPHILS # BLD AUTO: 0.09 10*3/MM3 (ref 0–0.2)
BASOPHILS NFR BLD AUTO: 0.7 % (ref 0–1.5)
BILIRUB SERPL-MCNC: 0.2 MG/DL (ref 0–1.2)
BILIRUB UR QL STRIP: NEGATIVE
BUN SERPL-MCNC: 39 MG/DL (ref 8–23)
BUN/CREAT SERPL: 19.3 (ref 7–25)
CALCIUM SPEC-SCNC: 9.8 MG/DL (ref 8.6–10.5)
CHLORIDE SERPL-SCNC: 101 MMOL/L (ref 98–107)
CLARITY UR: CLEAR
CO2 SERPL-SCNC: 22 MMOL/L (ref 22–29)
COLOR UR: YELLOW
CREAT SERPL-MCNC: 2.02 MG/DL (ref 0.57–1)
DEPRECATED RDW RBC AUTO: 48.3 FL (ref 37–54)
EOSINOPHIL # BLD AUTO: 0.45 10*3/MM3 (ref 0–0.4)
EOSINOPHIL NFR BLD AUTO: 3.7 % (ref 0.3–6.2)
ERYTHROCYTE [DISTWIDTH] IN BLOOD BY AUTOMATED COUNT: 15 % (ref 12.3–15.4)
FLUAV RNA RESP QL NAA+PROBE: NOT DETECTED
FLUBV RNA RESP QL NAA+PROBE: NOT DETECTED
GFR SERPL CREATININE-BSD FRML MDRD: 25 ML/MIN/1.73
GLOBULIN UR ELPH-MCNC: 4.2 GM/DL
GLUCOSE BLDC GLUCOMTR-MCNC: 139 MG/DL (ref 70–130)
GLUCOSE BLDC GLUCOMTR-MCNC: 88 MG/DL (ref 70–130)
GLUCOSE SERPL-MCNC: 272 MG/DL (ref 65–99)
GLUCOSE UR STRIP-MCNC: ABNORMAL MG/DL
HCT VFR BLD AUTO: 38.6 % (ref 34–46.6)
HGB BLD-MCNC: 12.3 G/DL (ref 12–15.9)
HGB UR QL STRIP.AUTO: ABNORMAL
HOLD SPECIMEN: NORMAL
HOLD SPECIMEN: NORMAL
HYALINE CASTS UR QL AUTO: ABNORMAL /LPF
IMM GRANULOCYTES # BLD AUTO: 0.08 10*3/MM3 (ref 0–0.05)
IMM GRANULOCYTES NFR BLD AUTO: 0.6 % (ref 0–0.5)
KETONES UR QL STRIP: NEGATIVE
LEUKOCYTE ESTERASE UR QL STRIP.AUTO: NEGATIVE
LIPASE SERPL-CCNC: 2613 U/L (ref 13–60)
LYMPHOCYTES # BLD AUTO: 3.09 10*3/MM3 (ref 0.7–3.1)
LYMPHOCYTES NFR BLD AUTO: 25.1 % (ref 19.6–45.3)
MCH RBC QN AUTO: 28 PG (ref 26.6–33)
MCHC RBC AUTO-ENTMCNC: 31.9 G/DL (ref 31.5–35.7)
MCV RBC AUTO: 87.9 FL (ref 79–97)
MONOCYTES # BLD AUTO: 0.71 10*3/MM3 (ref 0.1–0.9)
MONOCYTES NFR BLD AUTO: 5.8 % (ref 5–12)
NEUTROPHILS NFR BLD AUTO: 64.1 % (ref 42.7–76)
NEUTROPHILS NFR BLD AUTO: 7.89 10*3/MM3 (ref 1.7–7)
NITRITE UR QL STRIP: NEGATIVE
NRBC BLD AUTO-RTO: 0 /100 WBC (ref 0–0.2)
PH UR STRIP.AUTO: 6.5 [PH] (ref 5–9)
PLATELET # BLD AUTO: 322 10*3/MM3 (ref 140–450)
PMV BLD AUTO: 9.3 FL (ref 6–12)
POTASSIUM SERPL-SCNC: 5.2 MMOL/L (ref 3.5–5.2)
PROT SERPL-MCNC: 7.9 G/DL (ref 6–8.5)
PROT UR QL STRIP: ABNORMAL
RBC # BLD AUTO: 4.39 10*6/MM3 (ref 3.77–5.28)
RBC # UR: ABNORMAL /HPF
REF LAB TEST METHOD: ABNORMAL
SARS-COV-2 RNA RESP QL NAA+PROBE: NOT DETECTED
SODIUM SERPL-SCNC: 135 MMOL/L (ref 136–145)
SP GR UR STRIP: 1.02 (ref 1–1.03)
SQUAMOUS #/AREA URNS HPF: ABNORMAL /HPF
UROBILINOGEN UR QL STRIP: ABNORMAL
WBC # BLD AUTO: 12.31 10*3/MM3 (ref 3.4–10.8)
WBC UR QL AUTO: ABNORMAL /HPF
WHOLE BLOOD HOLD SPECIMEN: NORMAL
WHOLE BLOOD HOLD SPECIMEN: NORMAL

## 2020-12-18 PROCEDURE — 74176 CT ABD & PELVIS W/O CONTRAST: CPT

## 2020-12-18 PROCEDURE — 82962 GLUCOSE BLOOD TEST: CPT

## 2020-12-18 PROCEDURE — 85025 COMPLETE CBC W/AUTO DIFF WBC: CPT | Performed by: PHYSICIAN ASSISTANT

## 2020-12-18 PROCEDURE — 25010000002 ONDANSETRON PER 1 MG: Performed by: STUDENT IN AN ORGANIZED HEALTH CARE EDUCATION/TRAINING PROGRAM

## 2020-12-18 PROCEDURE — 25010000002 ENOXAPARIN PER 10 MG: Performed by: STUDENT IN AN ORGANIZED HEALTH CARE EDUCATION/TRAINING PROGRAM

## 2020-12-18 PROCEDURE — 87086 URINE CULTURE/COLONY COUNT: CPT | Performed by: PHYSICIAN ASSISTANT

## 2020-12-18 PROCEDURE — 81001 URINALYSIS AUTO W/SCOPE: CPT | Performed by: PHYSICIAN ASSISTANT

## 2020-12-18 PROCEDURE — 80053 COMPREHEN METABOLIC PANEL: CPT | Performed by: PHYSICIAN ASSISTANT

## 2020-12-18 PROCEDURE — 87636 SARSCOV2 & INF A&B AMP PRB: CPT | Performed by: FAMILY MEDICINE

## 2020-12-18 PROCEDURE — 25010000002 ONDANSETRON PER 1 MG: Performed by: PHYSICIAN ASSISTANT

## 2020-12-18 PROCEDURE — 99284 EMERGENCY DEPT VISIT MOD MDM: CPT

## 2020-12-18 PROCEDURE — 25010000002 MORPHINE PER 10 MG: Performed by: FAMILY MEDICINE

## 2020-12-18 PROCEDURE — 25010000002 MORPHINE PER 10 MG: Performed by: STUDENT IN AN ORGANIZED HEALTH CARE EDUCATION/TRAINING PROGRAM

## 2020-12-18 PROCEDURE — 83690 ASSAY OF LIPASE: CPT | Performed by: PHYSICIAN ASSISTANT

## 2020-12-18 PROCEDURE — 99214 OFFICE O/P EST MOD 30 MIN: CPT | Performed by: NURSE PRACTITIONER

## 2020-12-18 RX ORDER — ASPIRIN 81 MG/1
81 TABLET ORAL DAILY
Status: DISCONTINUED | OUTPATIENT
Start: 2020-12-18 | End: 2020-12-22 | Stop reason: HOSPADM

## 2020-12-18 RX ORDER — SODIUM CHLORIDE 9 MG/ML
150 INJECTION, SOLUTION INTRAVENOUS CONTINUOUS
Status: DISCONTINUED | OUTPATIENT
Start: 2020-12-18 | End: 2020-12-19

## 2020-12-18 RX ORDER — ISOSORBIDE MONONITRATE 30 MG/1
30 TABLET, EXTENDED RELEASE ORAL DAILY
COMMUNITY
End: 2021-03-26 | Stop reason: SDUPTHER

## 2020-12-18 RX ORDER — MONTELUKAST SODIUM 10 MG/1
10 TABLET ORAL NIGHTLY
Status: DISCONTINUED | OUTPATIENT
Start: 2020-12-18 | End: 2020-12-22 | Stop reason: HOSPADM

## 2020-12-18 RX ORDER — NICOTINE POLACRILEX 4 MG
15 LOZENGE BUCCAL
Status: DISCONTINUED | OUTPATIENT
Start: 2020-12-18 | End: 2020-12-22 | Stop reason: HOSPADM

## 2020-12-18 RX ORDER — ONDANSETRON 2 MG/ML
4 INJECTION INTRAMUSCULAR; INTRAVENOUS ONCE
Status: COMPLETED | OUTPATIENT
Start: 2020-12-18 | End: 2020-12-18

## 2020-12-18 RX ORDER — PANTOPRAZOLE SODIUM 40 MG/10ML
40 INJECTION, POWDER, LYOPHILIZED, FOR SOLUTION INTRAVENOUS
Status: DISCONTINUED | OUTPATIENT
Start: 2020-12-19 | End: 2020-12-22 | Stop reason: HOSPADM

## 2020-12-18 RX ORDER — GABAPENTIN 400 MG/1
400 CAPSULE ORAL EVERY 12 HOURS SCHEDULED
Status: DISCONTINUED | OUTPATIENT
Start: 2020-12-18 | End: 2020-12-22 | Stop reason: HOSPADM

## 2020-12-18 RX ORDER — SODIUM CHLORIDE 0.9 % (FLUSH) 0.9 %
10 SYRINGE (ML) INJECTION EVERY 12 HOURS SCHEDULED
Status: DISCONTINUED | OUTPATIENT
Start: 2020-12-18 | End: 2020-12-22 | Stop reason: HOSPADM

## 2020-12-18 RX ORDER — CYCLOBENZAPRINE HCL 10 MG
10 TABLET ORAL 2 TIMES DAILY PRN
Status: DISCONTINUED | OUTPATIENT
Start: 2020-12-18 | End: 2020-12-22 | Stop reason: HOSPADM

## 2020-12-18 RX ORDER — SODIUM CHLORIDE 9 MG/ML
125 INJECTION, SOLUTION INTRAVENOUS CONTINUOUS
Status: DISCONTINUED | OUTPATIENT
Start: 2020-12-18 | End: 2020-12-18

## 2020-12-18 RX ORDER — SODIUM CHLORIDE 0.9 % (FLUSH) 0.9 %
10 SYRINGE (ML) INJECTION AS NEEDED
Status: DISCONTINUED | OUTPATIENT
Start: 2020-12-18 | End: 2020-12-22 | Stop reason: HOSPADM

## 2020-12-18 RX ORDER — NALOXONE HCL 0.4 MG/ML
0.4 VIAL (ML) INJECTION
Status: DISCONTINUED | OUTPATIENT
Start: 2020-12-18 | End: 2020-12-22 | Stop reason: HOSPADM

## 2020-12-18 RX ORDER — NICOTINE 21 MG/24HR
1 PATCH, TRANSDERMAL 24 HOURS TRANSDERMAL EVERY 24 HOURS
Status: DISCONTINUED | OUTPATIENT
Start: 2020-12-18 | End: 2020-12-22 | Stop reason: HOSPADM

## 2020-12-18 RX ORDER — SODIUM BICARBONATE 325 MG/1
325 TABLET ORAL 2 TIMES DAILY
Status: DISCONTINUED | OUTPATIENT
Start: 2020-12-18 | End: 2020-12-22 | Stop reason: HOSPADM

## 2020-12-18 RX ORDER — METOPROLOL TARTRATE 50 MG/1
100 TABLET, FILM COATED ORAL EVERY 12 HOURS SCHEDULED
Status: DISCONTINUED | OUTPATIENT
Start: 2020-12-18 | End: 2020-12-22 | Stop reason: HOSPADM

## 2020-12-18 RX ORDER — ISOSORBIDE MONONITRATE 30 MG/1
30 TABLET, EXTENDED RELEASE ORAL
Status: DISCONTINUED | OUTPATIENT
Start: 2020-12-18 | End: 2020-12-22 | Stop reason: HOSPADM

## 2020-12-18 RX ORDER — ALBUTEROL SULFATE 90 UG/1
2 AEROSOL, METERED RESPIRATORY (INHALATION) EVERY 4 HOURS PRN
Status: DISCONTINUED | OUTPATIENT
Start: 2020-12-18 | End: 2020-12-22 | Stop reason: HOSPADM

## 2020-12-18 RX ORDER — ATORVASTATIN CALCIUM 40 MG/1
40 TABLET, FILM COATED ORAL DAILY
Status: DISCONTINUED | OUTPATIENT
Start: 2020-12-18 | End: 2020-12-22 | Stop reason: HOSPADM

## 2020-12-18 RX ORDER — HYDRALAZINE HYDROCHLORIDE 20 MG/ML
10 INJECTION INTRAMUSCULAR; INTRAVENOUS EVERY 6 HOURS PRN
Status: DISCONTINUED | OUTPATIENT
Start: 2020-12-18 | End: 2020-12-22 | Stop reason: HOSPADM

## 2020-12-18 RX ORDER — ONDANSETRON 2 MG/ML
4 INJECTION INTRAMUSCULAR; INTRAVENOUS EVERY 6 HOURS PRN
Status: DISCONTINUED | OUTPATIENT
Start: 2020-12-18 | End: 2020-12-22 | Stop reason: HOSPADM

## 2020-12-18 RX ORDER — CLOPIDOGREL BISULFATE 75 MG/1
75 TABLET ORAL DAILY
Status: DISCONTINUED | OUTPATIENT
Start: 2020-12-19 | End: 2020-12-22 | Stop reason: HOSPADM

## 2020-12-18 RX ORDER — SODIUM CHLORIDE 9 MG/ML
150 INJECTION, SOLUTION INTRAVENOUS CONTINUOUS
Status: DISCONTINUED | OUTPATIENT
Start: 2020-12-18 | End: 2020-12-18 | Stop reason: SDUPTHER

## 2020-12-18 RX ORDER — DEXTROSE MONOHYDRATE 25 G/50ML
25 INJECTION, SOLUTION INTRAVENOUS
Status: DISCONTINUED | OUTPATIENT
Start: 2020-12-18 | End: 2020-12-22 | Stop reason: HOSPADM

## 2020-12-18 RX ORDER — CETIRIZINE HYDROCHLORIDE 10 MG/1
10 TABLET ORAL DAILY
Status: DISCONTINUED | OUTPATIENT
Start: 2020-12-19 | End: 2020-12-22 | Stop reason: HOSPADM

## 2020-12-18 RX ADMIN — MORPHINE SULFATE 4 MG: 4 INJECTION, SOLUTION INTRAMUSCULAR; INTRAVENOUS at 20:53

## 2020-12-18 RX ADMIN — SODIUM CHLORIDE, PRESERVATIVE FREE 10 ML: 5 INJECTION INTRAVENOUS at 21:07

## 2020-12-18 RX ADMIN — MORPHINE SULFATE 4 MG: 4 INJECTION INTRAVENOUS at 13:09

## 2020-12-18 RX ADMIN — SODIUM BICARBONATE TAB 325 MG 325 MG: 325 TAB at 21:06

## 2020-12-18 RX ADMIN — ENOXAPARIN SODIUM 40 MG: 40 INJECTION SUBCUTANEOUS at 16:38

## 2020-12-18 RX ADMIN — MONTELUKAST SODIUM 10 MG: 10 TABLET, COATED ORAL at 21:06

## 2020-12-18 RX ADMIN — ONDANSETRON 4 MG: 2 SOLUTION INTRAMUSCULAR; INTRAVENOUS at 20:53

## 2020-12-18 RX ADMIN — METOPROLOL TARTRATE 100 MG: 50 TABLET, FILM COATED ORAL at 21:06

## 2020-12-18 RX ADMIN — SODIUM CHLORIDE 1000 ML: 900 INJECTION, SOLUTION INTRAVENOUS at 13:49

## 2020-12-18 RX ADMIN — SODIUM CHLORIDE 100 ML/HR: 900 INJECTION, SOLUTION INTRAVENOUS at 16:46

## 2020-12-18 RX ADMIN — GABAPENTIN 400 MG: 400 CAPSULE ORAL at 21:07

## 2020-12-18 RX ADMIN — NICOTINE 1 PATCH: 14 PATCH, EXTENDED RELEASE TRANSDERMAL at 16:38

## 2020-12-18 RX ADMIN — ONDANSETRON HYDROCHLORIDE 4 MG: 2 INJECTION, SOLUTION INTRAMUSCULAR; INTRAVENOUS at 13:09

## 2020-12-18 NOTE — PROGRESS NOTES
New Patient Sleep Medicine Consultation    Encounter Date: 2020         Patient's Primary Care Provider: Nirmal Calvillo MD  Referring Provider: Eugenio Barragan,*  Reason for consultation/chief complaint: known MIKE, not currently on CPAP    Yamileth Slater is a 60 y.o. female who admits to snoring, decreased libido, excessive daytime sleepiness, stop breathing during sleep, Up to the bathroom at night, restless legs at night and sleepwalking or sleeptalking, high blood pressure.     Patient has not been on PAP therapy for >1 year. She reports nonuse of her CPAP due to struggling with drooling into her mask and inability to properly clean her equipment. She states that the drooling has worsened over possibly the past year and she reports possibly having a stroke which lead to facial drooping, but she is unsure. I did not find documentation regarding previous CVA/TIA.    Of note, she has a fairly extensive cardiopulmonary history, including COPD, asthma, CAD S/P CABG, pulmonary hypertension, HTN, and carotid artery stenosis. Patient states that she is presently experiencing discomfort and her upper abdomen, leading into her chest, but denies chest pain x 5 days. She states that she is going to the Emergency Room to be evaluated after our visit.     She denies cataplexy, sleep paralysis, or hypnagogic hallucinations. Her bedtime is ~ 0157-7414. She  falls asleep after 0-1 minutes, and is up 3-4 times per night. She wakes up ~ 7782-7696. She endorses 8-9 hours of sleep. She drinks 12 cups of coffee, 1 teas, and 0 sodas per day. She drinks 0 alcoholic beverages per week. She is a current smoker. She does not take sedatives or hypnotics. She has no sleepiness with driving (does not drive). She naps every day at 1400 for at least 1 hour.     [place] - 20    Sleep Testin. Split night PSG on 2016, AHI of 29.2   2. CPAP titration on same day, recommended 15 cm H2O   3. Previously on 13 cm  H2O      Past Medical History:   Diagnosis Date   • Anxiety states    • Asthma    • Carotid artery stenosis     DWIGHT 0-15%, LICA 0-15%. LICA stent 5/2014.   • Chronic folliculitis    • Chronic obstructive lung disease (CMS/HCC)    • Coronary arteriosclerosis    • Diabetes mellitus (CMS/[facility])     no retinopathy; A1C 9.1      • Dyslipidemia    • Essential hypertension    • Excoriated eczema     asteosis/keratosis   • GERD (gastroesophageal reflux disease)    • History of colon polyps    • Hypertensive disorder    • Kidney stone    • Mixed hyperlipidemia    • Morbid obesity due to excess calories (CMS/[facility])     BMI 44.5   • Neurologic disorder associated with diabetes mellitus (CMS/HCC)    • Non-healing surgical wound     LLE EVHa   • Peripheral vascular disease (CMS/[facility])    • Sleep apnea    • Tobacco dependence syndrome     1/2ppd      • Type 2 diabetes mellitus (CMS/[facility])    • Vitamin D deficiency      Social History     Socioeconomic History   • Marital status:      Spouse name: wesley dumont   • Number of children: Not on file   • Years of education: Not on file   • Highest education level: Not on file   Tobacco Use   • Smoking status: Current Every Day Smoker     Packs/day: 0.25     Years: 40.00     Pack years: 10.00     Types: Cigarettes   • Smokeless tobacco: Never Used   Substance and Sexual Activity   • Alcohol use: No   • Drug use: Yes     Types: LSD, Marijuana, Methamphetamines     Comment: Lasted used in 2006   • Sexual activity: Not Currently     Partners: Male     Family History   Problem Relation Age of Onset   • Heart disease Mother    • Cancer Mother    • Heart disease Father    • Heart disease Sister    • Cancer Sister    • Heart disease Brother      FH of sleep disorders:  - MIKE on CPAP  Other family history + for: DM  Occupation: Disabled  Marital status:   Children: 2  Has 3 brothers and 5 sisters  Smoking history: smoked 3/4 ppds from age 15 until present - trying to  "quit      Review of Systems:  Constitutional: positive for fatigue  Eyes: negative  Ears, nose, mouth, throat, and face: negative  Respiratory: positive for chronic bronchitis, cough and dyspnea on exertion  Cardiovascular: positive for cough, dyspnea, exertional chest pressure/discomfort and fatigue  Gastrointestinal: positive for abdominal pain and side pain  Genitourinary:negative  Integument/breast: negative  Hematologic/lymphatic: negative  Musculoskeletal:negative  Neurological: negative  Behavioral/Psych: negative  Endocrine: negative  Allergic/Immunologic: negative Patient advised to discuss any positive ROS with PCP.      Vitals:    12/18/20 1023   BP: 144/92   Pulse: 82   SpO2: 95%           12/18/20  1023   Weight: 130 kg (286 lb)       Body mass index is 52.3 kg/m². Patient's Body mass index is 52.3 kg/m². BMI is above normal parameters. Recommendations include: referral to primary care.      Neck circumference: 18\"          General: Alert. Cooperative. Well developed. No acute distress.             Head:  Normocephalic. Atraumatic.              Eyes: Sclera clear. No icterus. PERRLA. Normal EOM.             Ears: No deformities. Normal hearing.             Nose: No septal deviation. No drainage.          Throat: No oral lesions. No thrush. Moist mucous membranes. Trachea midline   Slight right-sided facial drooping    Tongue is enlarged     Dentition is fair       Pharynx: Posterior pharyngeal pillars are narrow    Mallampati score of III (soft and hard palate and base of uvula visible)    Pharynx is normal with unrermarkable tonsils   Chest Wall:  Normal shape. Symmetric expansion with respiration. No tenderness.          Lungs:  Clear to auscultation bilaterally. No wheezes. No rhonchi. No rales. Respirations regular, even and unlabored.            Heart:  Regular rhythm and normal rate. Normal S1 and S2. No murmur.     Abdomen:  Soft, non-tender and non-distended. No masses.  Extremities:  Moves all " extremities well. Trace edema.               Skin: Dry. Intact. No bleeding. No rash.           Neuro: Moves all 4 extremities and cranial nerves grossly intact.  Psychiatric: Normal mood and affect.      Current Outpatient Medications:   •  albuterol (ACCUNEB) 0.63 MG/3ML nebulizer solution, Take 3 mL by nebulization Every 6 (Six) Hours As Needed for Wheezing., Disp: 20 vial, Rfl: 0  •  albuterol sulfate  (90 Base) MCG/ACT inhaler, INHALE 2 PUFFS EVERY 4 (FOUR) HOURS AS NEEDED FOR WHEEZING., Disp: 18 g, Rfl: 1  •  aspirin (aspirin) 81 MG EC tablet, Take 1 tablet by mouth Daily., Disp: 31 tablet, Rfl: 6  •  atorvastatin (LIPITOR) 40 MG tablet, Take 1 tablet by mouth Daily., Disp: 90 tablet, Rfl: 0  •  betamethasone valerate (VALISONE) 0.1 % cream, Apply  topically to the appropriate area as directed Daily., Disp: 45 g, Rfl: 2  •  Blood Glucose Monitoring Suppl ([employer] Blood Glucose Meter) w/Device kit, 1 each 3 (Three) Times a Day., Disp: 1 kit, Rfl: 3  •  cetirizine (zyrTEC) 10 MG tablet, Take 1 tablet by mouth Daily., Disp: 30 tablet, Rfl: 3  •  clopidogrel (PLAVIX) 75 MG tablet, Take 1 tablet by mouth Daily., Disp: 30 tablet, Rfl: 5  •  cyclobenzaprine (FLEXERIL) 10 MG tablet, Take 1 tablet by mouth 2 (Two) Times a Day As Needed for Muscle Spasms., Disp: 60 tablet, Rfl: 5  •  EASY TOUCH PEN NEEDLES 31G X 8 MM misc, , Disp: , Rfl:   •  Empagliflozin (Jardiance) 10 MG tablet, Take 10 mg by mouth Every Morning., Disp: 90 tablet, Rfl: 3  •  ferrous sulfate (FeroSul) 325 (65 FE) MG tablet, Take 1 tablet by mouth Daily With Breakfast., Disp: 30 tablet, Rfl: 3  •  furosemide (LASIX) 40 MG tablet, Take 1 tablet by mouth Daily., Disp: 30 tablet, Rfl: 3  •  gabapentin (NEURONTIN) 800 MG tablet, TAKE 1 TABLET BY MOUTH 3 (THREE) TIMES A DAY. FOR NEUROPATHY, Disp: 90 tablet, Rfl: 0  •  glucose blood test strip, Use as instructed, Disp: 100 each, Rfl: 12  •  Insulin Glargine (Lantus SoloStar) 100 UNIT/ML injection pen,  Inject 95 Units under the skin into the appropriate area as directed Every 12 (Twelve) Hours., Disp: 60 mL, Rfl: 11  •  Insulin Pen Needle (NovoFine) 30G X 8 MM misc, As directed 4 times daily, Disp: 100 each, Rfl: 11  •  ipratropium-albuterol (DUO-NEB) 0.5-2.5 mg/3 ml nebulizer, Take 3 mL by nebulization 4 (Four) Times a Day. ICD10 code J96.01, Disp: 360 mL, Rfl: 0  •  isosorbide mononitrate (IMDUR) 30 MG 24 hr tablet, Take 1 tablet by mouth Daily for 30 days., Disp: 30 tablet, Rfl: 0  •  lidocaine (LIDODERM) 5 %, PLACE 1 PATCH ON THE SKIN AS DIRECTED BY PROVIDER DAILY. REMOVE & DISCARD PATCH WITHIN 12 HOURS OR AS DIRECTED BY MD, Disp: 30 patch, Rfl: 3  •  lidocaine (XYLOCAINE) 5 % ointment, Apply  topically to the appropriate area as directed Every 2 (Two) Hours As Needed for Mild Pain ., Disp: 1 tube, Rfl: 0  •  lisinopril (PRINIVIL,ZESTRIL) 10 MG tablet, Take 1 tablet by mouth Daily., Disp: 30 tablet, Rfl: 5  •  metoprolol tartrate (LOPRESSOR) 100 MG tablet, Take 1 tablet by mouth Every 12 (Twelve) Hours for 30 days., Disp: 60 tablet, Rfl: 0  •  montelukast (SINGULAIR) 10 MG tablet, Take 1 tablet by mouth Every Night., Disp: 30 tablet, Rfl: 5  •  nicotine (Nicoderm CQ) 21 MG/24HR patch, Place 1 patch on the skin as directed by provider Daily., Disp: 42 patch, Rfl: 0  •  nitroglycerin (NITROSTAT) 0.4 MG SL tablet, Place 1 tablet under the tongue As Needed for Chest Pain. Take no more than 3 doses in 15 minutes., Disp: 30 tablet, Rfl: 3  •  nystatin (MYCOSTATIN) 542883 UNIT/GM powder, Apply  topically to the appropriate area as directed 3 (Three) Times a Day., Disp: 15 g, Rfl: 1  •  ondansetron ODT (Zofran ODT) 4 MG disintegrating tablet, Place 1 tablet on the tongue Every 12 (Twelve) Hours As Needed for Nausea or Vomiting., Disp: 30 tablet, Rfl: 0  •  oxybutynin XL (DITROPAN-XL) 5 MG 24 hr tablet, , Disp: , Rfl:   •  pantoprazole (PROTONIX) 40 MG EC tablet, Take 1 tablet by mouth Every Night., Disp: 30 tablet,  Rfl: 5  •  potassium chloride (MICRO-K) 10 MEQ CR capsule, Take 1 capsule by mouth Daily., Disp: 30 capsule, Rfl: 5  •  promethazine (PHENERGAN) 25 MG tablet, Take 1 tablet by mouth Every 6 (Six) Hours As Needed for Nausea or Vomiting., Disp: 30 tablet, Rfl: 0  •  sodium bicarbonate 325 MG tablet, Take 1 tablet by mouth 2 (Two) Times a Day., Disp: 60 tablet, Rfl: 5  •  SPS 15 GM/60ML suspension, , Disp: , Rfl:   •  Victoza 18 MG/3ML solution pen-injector injection, INJECT 1.2 MG UNDER THE SKIN INTO THE APPROPRIATE AREA AS DIRECTED DAILY., Disp: 6 mL, Rfl: 1    WBC   Date Value Ref Range Status   10/06/2020 10.09 3.40 - 10.80 10*3/mm3 Final     RBC   Date Value Ref Range Status   10/06/2020 4.24 3.77 - 5.28 10*6/mm3 Final     Hemoglobin   Date Value Ref Range Status   10/06/2020 11.4 (L) 12.0 - 15.9 g/dL Final     Hematocrit   Date Value Ref Range Status   10/06/2020 35.9 34.0 - 46.6 % Final     MCV   Date Value Ref Range Status   10/06/2020 84.7 79.0 - 97.0 fL Final     MCH   Date Value Ref Range Status   10/06/2020 26.9 26.6 - 33.0 pg Final     MCHC   Date Value Ref Range Status   10/06/2020 31.8 31.5 - 35.7 g/dL Final     RDW   Date Value Ref Range Status   10/06/2020 15.6 (H) 12.3 - 15.4 % Final     RDW-SD   Date Value Ref Range Status   10/06/2020 48.0 37.0 - 54.0 fl Final     MPV   Date Value Ref Range Status   10/06/2020 9.3 6.0 - 12.0 fL Final     Platelets   Date Value Ref Range Status   10/06/2020 265 140 - 450 10*3/mm3 Final     Neutrophil %   Date Value Ref Range Status   10/06/2020 57.7 42.7 - 76.0 % Final     Lymphocyte %   Date Value Ref Range Status   10/06/2020 28.4 19.6 - 45.3 % Final     Monocyte %   Date Value Ref Range Status   10/06/2020 6.2 5.0 - 12.0 % Final     Eosinophil %   Date Value Ref Range Status   10/06/2020 6.2 0.3 - 6.2 % Final     Basophil %   Date Value Ref Range Status   10/06/2020 0.9 0.0 - 1.5 % Final     Immature Grans %   Date Value Ref Range Status   10/06/2020 0.6 (H) 0.0 -  "0.5 % Final     Neutrophils, Absolute   Date Value Ref Range Status   10/06/2020 5.81 1.70 - 7.00 10*3/mm3 Final     Lymphocytes, Absolute   Date Value Ref Range Status   10/06/2020 2.87 0.70 - 3.10 10*3/mm3 Final     Monocytes, Absolute   Date Value Ref Range Status   10/06/2020 0.63 0.10 - 0.90 10*3/mm3 Final     Eosinophils, Absolute   Date Value Ref Range Status   10/06/2020 0.63 (H) 0.00 - 0.40 10*3/mm3 Final     Basophils, Absolute   Date Value Ref Range Status   10/06/2020 0.09 0.00 - 0.20 10*3/mm3 Final     Immature Grans, Absolute   Date Value Ref Range Status   10/06/2020 0.06 (H) 0.00 - 0.05 10*3/mm3 Final     nRBC   Date Value Ref Range Status   10/06/2020 0.0 0.0 - 0.2 /100 WBC Final         ASSESSMENT:  1. Obstructive sleep apnea - Established, worsening (2)  1. Resume PAP therapy at previous settings of 13 cm H2O  2. Script for PAP supplies  3. Discussed mask options - add chin strap if needed to aid with drooling  4. Return to clinic in 4-6 weeks with compliance report  5. Call if any problems re-adapting to PAP  6. May need titration study in the future given significant symptoms as well as cardiopulmonary comorbidities  2. Restless leg syndrome/ Periodic limb movement disorder - (RLS/PLMD) New (to me), additional work-up planned (4)  1. Takes gabapentin 800 mg BID-TID and ferrous sulfate 325 mg daily  2. Consider checking iron/ferritin, optimizing therapy with gabapentin, consider Mirapex/Requip if necessary  3. MIKE/COPD overlap syndrome - New (to me), no additional work-up planned (3)    1.   As above  4. Morbid obesity - BMI 52.3 - Established, not controlled  5. Nicotine dependence with complication - New (to me), no additional work-up planned (3)    1.   Given [employer]\"[employer] \"Thinking of quitting smoking\" flyer and referred patient to call 7-197-QUIT-NOW. Smoking and tobacco use cessation counseling visit was 3.5 minutes    2.   Patient is trying to quit      Total time spent: 35 min, more than half " spent in face to face counseling and coordination of care.  Personally reviewed recent visit notes from PCP as well as Cardiologist.   Reviewed recent labs/procedure notes.      RTC in 1 month (4-6 weeks).          This document has been electronically signed by BRIDGETT Elias on December 18, 2020 10:36 CST          CC: Nirmal Calvillo MD Heather[place], Steven Joel,*

## 2020-12-18 NOTE — ED PROVIDER NOTES
Subjective   Patient presents to emergency department for abdominal pain, nausea, mild cough/shortness of breath x 5 days.  Denies any fever/chills, vomiting, diarrhea.      History provided by:  Patient   used: No    Abdominal Pain  Pain location:  Generalized  Duration:  5 days  Timing:  Constant  Progression:  Worsening  Chronicity:  New  Associated symptoms: cough and nausea    Associated symptoms: no chest pain, no chills, no dysuria, no fever, no shortness of breath, no sore throat and no vomiting        Review of Systems   Constitutional: Negative for chills and fever.   HENT: Positive for congestion. Negative for sore throat and trouble swallowing.    Eyes: Negative for visual disturbance.   Respiratory: Positive for cough. Negative for shortness of breath and wheezing.    Cardiovascular: Negative for chest pain.   Gastrointestinal: Positive for abdominal pain and nausea. Negative for vomiting.   Genitourinary: Negative for dysuria and flank pain.   Skin: Negative for color change.   Allergic/Immunologic: Negative for immunocompromised state.   Neurological: Negative for syncope and weakness.   Hematological: Does not bruise/bleed easily.   Psychiatric/Behavioral: Negative for confusion.       Past Medical History:   Diagnosis Date   • Anxiety states    • Asthma    • Carotid artery stenosis     DWIGHT 0-15%, LICA 0-15%. LICA stent 5/2014.   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 Normal LV systolic function with no wall motion abnormality.    • CARDIAC CATHETERIZATION  05/27/2014     LEFT Carotid Stent    • CARDIAC CATHETERIZATION N/A 7/14/2020    Procedure: Right Heart Cath;  Surgeon: Eugenio Barragan MD PhD;  Location: [facility] CATH INVASIVE LOCATION;  Service: Cardiology;  Laterality: N/A;   • CAROTID STENT Left 05/27/2014   • COLONOSCOPY  05/02/2016    Normal colon.Diverticulosis in the sigmoid colon.No specimens collected.    • CORONARY ARTERY BYPASS GRAFT  11/15/2013    CABG x 3 with LIMA to LAD and coronary endarterectomy to LAD, SVG to Ramus intermedius branch and SVG to PDA.    • CYSTOSCOPY BLADDER STONE LITHOTRIPSY Bilateral     6 times   • CYSTOSCOPY URETEROSCOPY LASER LITHOTRIPSY     • ENDOSCOPY  09/23/2015     Esophageal stricture present.Normal stomach.Normal duodenum.    • ENDOSCOPY  11/16/2015    w/ dilatation - Esophageal stricture present,Dilatation performed.Normal stomach and duodenum.    • HYSTERECTOMY     • INCISION AND DRAINAGE ABSCESS  01/02/2016    Incision and drainage of right perineal abscess.    • INCISION AND DRAINAGE ABSCESS  02/18/2014    Incision and Drainage of abscess of left lower leg    • OTHER SURGICAL HISTORY  01/25/2016    DESTRUCTION OF BENIGN LESION      • OTHER SURGICAL HISTORY  04/18/2014    ear/neck - L attempted CEA, Neck Dissection    • TUBAL ABDOMINAL LIGATION         Family History   Problem Relation Age of Onset   • Heart  "disease Mother    • Cancer Mother    • Heart disease Father    • Heart disease Sister    • Cancer Sister    • Heart disease Brother        Social History     Socioeconomic History   • Marital status:      Spouse name: wesley dumont   • Number of children: Not on file   • Years of education: Not on file   • Highest education level: Not on file   Tobacco Use   • Smoking status: Current Every Day Smoker     Packs/day: 0.25     Years: 46.00     Pack years: 11.50     Types: Cigarettes   • Smokeless tobacco: Never Used   Substance and Sexual Activity   • Alcohol use: No   • Drug use: Yes     Types: LSD, Marijuana, Methamphetamines     Comment: Lasted used in 2006   • Sexual activity: Not Currently     Partners: Male           Objective      /75 (BP Location: Left arm, Patient Position: Lying)   Pulse 86   Temp 97.4 °F (36.3 °C) (Temporal)   Resp 20   Ht 157.5 cm (62\")   Wt 129 kg (283 lb 11.2 oz)   LMP  (LMP Unknown)   SpO2 96%   BMI 51.89 kg/m²     Physical Exam  Vitals signs and nursing note reviewed.   Constitutional:       Appearance: She is well-developed. She is obese.   HENT:      Head: Normocephalic and atraumatic.   Cardiovascular:      Rate and Rhythm: Normal rate and regular rhythm.      Heart sounds: Normal heart sounds.   Pulmonary:      Effort: Pulmonary effort is normal.      Breath sounds: Normal breath sounds.   Abdominal:      Tenderness: There is generalized abdominal tenderness.      Comments: Distant bowel sounds   Genitourinary:     Rectum: Normal.   Skin:     General: Skin is warm.      Capillary Refill: Capillary refill takes less than 2 seconds.   Neurological:      General: No focal deficit present.      Mental Status: She is alert.         Procedures           ED Course  ED Course as of Dec 18 1605   Fri Dec 18, 2020   1356 Paged FP Resident on call.      [HENRIQUE]      ED Course User Index  [HENRIQUE] Bebeto Coyle PA-C      Results for orders placed or performed during the " hospital encounter of 12/18/20   COVID-19 and FLU A/B PCR - Swab, Nasopharynx    Specimen: Nasopharynx; Swab   Result Value Ref Range    COVID19 Not Detected Not Detected - Ref. Range    Influenza A PCR Not Detected Not Detected    Influenza B PCR Not Detected Not Detected   Comprehensive Metabolic Panel    Specimen: Blood   Result Value Ref Range    Glucose 272 (H) 65 - 99 mg/dL    BUN 39 (H) 8 - 23 mg/dL    Creatinine 2.02 (H) 0.57 - 1.00 mg/dL    Sodium 135 (L) 136 - 145 mmol/L    Potassium 5.2 3.5 - 5.2 mmol/L    Chloride 101 98 - 107 mmol/L    CO2 22.0 22.0 - 29.0 mmol/L    Calcium 9.8 8.6 - 10.5 mg/dL    Total Protein 7.9 6.0 - 8.5 g/dL    Albumin 3.70 3.50 - 5.20 g/dL    ALT (SGPT) 19 1 - 33 U/L    AST (SGOT) 32 1 - 32 U/L    Alkaline Phosphatase 171 (H) 39 - 117 U/L    Total Bilirubin 0.2 0.0 - 1.2 mg/dL    eGFR Non African Amer 25 (L) >60 mL/min/1.73    Globulin 4.2 gm/dL    A/G Ratio 0.9 g/dL    BUN/Creatinine Ratio 19.3 7.0 - 25.0    Anion Gap 12.0 5.0 - 15.0 mmol/L   Lipase    Specimen: Blood   Result Value Ref Range    Lipase 2,613 (H) 13 - 60 U/L   Urinalysis With Culture If Indicated - Urine, Clean Catch    Specimen: Urine, Clean Catch   Result Value Ref Range    Color, UA Yellow Yellow, Straw, Dark Yellow, Jennifer    Appearance, UA Clear Clear    pH, UA 6.5 5.0 - 9.0    Specific Gravity, UA 1.023 1.003 - 1.030    Glucose, UA >=1000 mg/dL (3+) (A) Negative    Ketones, UA Negative Negative    Bilirubin, UA Negative Negative    Blood, UA Trace (A) Negative    Protein, UA >=300 mg/dL (3+) (A) Negative    Leuk Esterase, UA Negative Negative    Nitrite, UA Negative Negative    Urobilinogen, UA 0.2 E.U./dL 0.2 - 1.0 E.U./dL   CBC Auto Differential    Specimen: Blood   Result Value Ref Range    WBC 12.31 (H) 3.40 - 10.80 10*3/mm3    RBC 4.39 3.77 - 5.28 10*6/mm3    Hemoglobin 12.3 12.0 - 15.9 g/dL    Hematocrit 38.6 34.0 - 46.6 %    MCV 87.9 79.0 - 97.0 fL    MCH 28.0 26.6 - 33.0 pg    MCHC 31.9 31.5 - 35.7  g/dL    RDW 15.0 12.3 - 15.4 %    RDW-SD 48.3 37.0 - 54.0 fl    MPV 9.3 6.0 - 12.0 fL    Platelets 322 140 - 450 10*3/mm3    Neutrophil % 64.1 42.7 - 76.0 %    Lymphocyte % 25.1 19.6 - 45.3 %    Monocyte % 5.8 5.0 - 12.0 %    Eosinophil % 3.7 0.3 - 6.2 %    Basophil % 0.7 0.0 - 1.5 %    Immature Grans % 0.6 (H) 0.0 - 0.5 %    Neutrophils, Absolute 7.89 (H) 1.70 - 7.00 10*3/mm3    Lymphocytes, Absolute 3.09 0.70 - 3.10 10*3/mm3    Monocytes, Absolute 0.71 0.10 - 0.90 10*3/mm3    Eosinophils, Absolute 0.45 (H) 0.00 - 0.40 10*3/mm3    Basophils, Absolute 0.09 0.00 - 0.20 10*3/mm3    Immature Grans, Absolute 0.08 (H) 0.00 - 0.05 10*3/mm3    nRBC 0.0 0.0 - 0.2 /100 WBC   Urinalysis, Microscopic Only - Urine, Clean Catch    Specimen: Urine, Clean Catch   Result Value Ref Range    RBC, UA 0-2 (A) None Seen /HPF    WBC, UA 6-12 (A) None Seen, 0-2, 3-5 /HPF    Bacteria, UA None Seen None Seen /HPF    Squamous Epithelial Cells, UA None Seen None Seen, 0-2 /HPF    Hyaline Casts, UA None Seen None Seen /LPF    Methodology Automated Microscopy    Light Blue Top   Result Value Ref Range    Extra Tube hold for add-on    Green Top (Gel)   Result Value Ref Range    Extra Tube Hold for add-ons.    Lavender Top   Result Value Ref Range    Extra Tube hold for add-on    Gold Top - SST   Result Value Ref Range    Extra Tube Hold for add-ons.      Ct Abdomen Pelvis Without Contrast    Result Date: 12/18/2020  Narrative: PROCEDURE: Ct abdomen and pelvis without contrast REASON FOR EXAM: abdominal pain/distension FINDINGS: Comparison study dated  December 12, 2016. Axial computer tomography sequential imaging was performed from the diaphragms through the symphysis pubis without IV contrast administration. Sagittal and coronal reformates was performed. This exam was performed according to our departmental dose optimization program, which includes automated exposure control, adjustment of the mA and/or KV according to patient size and/or use  of iterative reconstruction technique.  Imaging through lung bases reveals no abnormality. The liver is normal. The gallbladder is normal. The biliary system is normal. The pancreas is normal. The spleen is normal. Bilateral adrenal glands are normal. Right kidney and ureter are normal. Left kidney and ureter are normal. The bladder is normal. The uterus is surgically absent. The hollow viscera is normal. The appendix is identified appears normal. No lymphadenopathy in the abdomen or the pelvis. Very small shotty left and right para-aortic lymph nodes all which have a short axis diameter of less than 1 cm and by strict size criteria would be benign. No acute osseous abnormality.     Impression: 1. No acute CT abdomen or pelvis abnormality.. Electronically signed by:  Young Johnson MD  12/18/2020 12:54 PM CST Workstation: [id number]         Patient admitted to family practice resident on-call.                                MDM    Final diagnoses:   Acute pancreatitis, unspecified complication status, unspecified pancreatitis type            Bebeto Coyle PA-C  12/18/20 [id number]

## 2020-12-18 NOTE — PATIENT INSTRUCTIONS
Inquire with DME company regarding obtaining a chin strap to be used with CPAP. May use bandana in place of chin strap if needed.    Inquire with [employer] regarding use of remaining insurance monies to apply toward the balance of cleaning system if you wish.

## 2020-12-18 NOTE — H&P
HISTORY AND PHYSICAL  NAME: Yamileth Slater  : 1960  MRN: [id number]    DATE OF ADMISSION: 20    DATE & TIME SEEN: 20 16:38 CST    PCP: Nirmal Calvillo MD    CODE STATUS:   Code Status and Medical Interventions:   Ordered at: 20 6268     Level Of Support Discussed With:    Patient     Code Status:    CPR     Medical Interventions (Level of Support Prior to Arrest):    Full       CHIEF COMPLAINT: Abdominal Pain    HPI:  Yamileth Slater is a 60 y.o. female with a PMH of CAD s/p 3 CABG, Asthma, DMT2, GERD, recurrent nephrolithiasis, COPD, and tobacco use who presents with 5 days of abdominal pain.     Patient states that 5 days ago, she started experiencing pain that spanned her whole abdomen from her armpits to pant-line, all the way across.  She says the pain spares her heart.  To date still has appendix and gall bladder.  Has not had this type of pain before in the past.  Does not abuse alcohol.  No other illicit substances.  Smokes 1-5 cigs per day now.    The pain was waxing and did not wane.  She could not define any provoking factors such as fatty foods.  Palliating factors are staying still.  Tylenol did not help.  Endorsed N/D. Denied V.  Has not had anything to eat since last night, except for her medications.    CONCURRENT MEDICAL HISTORY:  Past Medical History:   Diagnosis Date   • Anxiety states    • Asthma    • Carotid artery stenosis     DWIGHT 0-15%, LICA 0-15%. LICA stent 2014.   • Chronic folliculitis    • Chronic obstructive lung disease (CMS/[facility])    • Coronary arteriosclerosis    • Diabetes mellitus (CMS/[facility])     no retinopathy; A1C 9.1      • Dyslipidemia    • Essential hypertension    • Excoriated eczema     asteosis/keratosis   • GERD (gastroesophageal reflux disease)    • History of colon polyps    • Hypertensive disorder    • Kidney stone    • Mixed hyperlipidemia    • Morbid obesity due to excess calories (CMS/[facility])     BMI 44.5   • Neurologic  disorder associated with diabetes mellitus (CMS/HCC)    • Non-healing surgical wound     LLE EVHa   • Peripheral vascular disease (CMS/HCC)    • Sleep apnea    • Tobacco dependence syndrome     1/2ppd      • Type 2 diabetes mellitus (CMS/HCC)    • Vitamin D deficiency        PAST SURGICAL HISTORY:  Past Surgical History:   Procedure Laterality Date   • CARDIAC CATHETERIZATION  08/09/2013    Severe multivessel CAD with critical lesions noted in the RCA, obtuse marginal two, ramus intermemdious, and LAD as described above. Normal LV systolic function with no wall motion abnormality.    • CARDIAC CATHETERIZATION  05/27/2014     LEFT Carotid Stent    • CARDIAC CATHETERIZATION N/A 7/14/2020    Procedure: Right Heart Cath;  Surgeon: Eugenio Barragan MD PhD;  Location: [facility] CATH INVASIVE LOCATION;  Service: Cardiology;  Laterality: N/A;   • CAROTID STENT Left 05/27/2014   • COLONOSCOPY  05/02/2016    Normal colon.Diverticulosis in the sigmoid colon.No specimens collected.    • CORONARY ARTERY BYPASS GRAFT  11/15/2013    CABG x 3 with LIMA to LAD and coronary endarterectomy to LAD, SVG to Ramus intermedius branch and SVG to PDA.    • CYSTOSCOPY BLADDER STONE LITHOTRIPSY Bilateral     6 times   • CYSTOSCOPY URETEROSCOPY LASER LITHOTRIPSY     • ENDOSCOPY  09/23/2015     Esophageal stricture present.Normal stomach.Normal duodenum.    • ENDOSCOPY  11/16/2015    w/ dilatation - Esophageal stricture present,Dilatation performed.Normal stomach and duodenum.    • HYSTERECTOMY     • INCISION AND DRAINAGE ABSCESS  01/02/2016    Incision and drainage of right perineal abscess.    • INCISION AND DRAINAGE ABSCESS  02/18/2014    Incision and Drainage of abscess of left lower leg    • OTHER SURGICAL HISTORY  01/25/2016    DESTRUCTION OF BENIGN LESION      • OTHER SURGICAL HISTORY  04/18/2014    ear/neck - L attempted CEA, Neck Dissection    • TUBAL ABDOMINAL LIGATION         FAMILY HISTORY:  Family History   Problem Relation Age  of Onset   • Heart disease Mother    • Cancer Mother    • Heart disease Father    • Heart disease Sister    • Cancer Sister    • Heart disease Brother         SOCIAL HISTORY:  Social History     Socioeconomic History   • Marital status:      Spouse name: wesley dumont   • Number of children: Not on file   • Years of education: Not on file   • Highest education level: Not on file   Tobacco Use   • Smoking status: Current Every Day Smoker     Packs/day: 0.25     Years: 46.00     Pack years: 11.50     Types: Cigarettes   • Smokeless tobacco: Never Used   Substance and Sexual Activity   • Alcohol use: No   • Drug use: Yes     Types: LSD, Marijuana, Methamphetamines     Comment: Lasted used in 2006   • Sexual activity: Not Currently     Partners: Male       HOME MEDICATIONS:  Prior to Admission medications    Medication Sig Start Date End Date Taking? Authorizing Provider   albuterol sulfate  (90 Base) MCG/ACT inhaler INHALE 2 PUFFS EVERY 4 (FOUR) HOURS AS NEEDED FOR WHEEZING. 10/28/20  Yes Nirmal Calvillo MD   aspirin (aspirin) 81 MG EC tablet Take 1 tablet by mouth Daily. 10/9/20  Yes Nirmal Calvillo MD   atorvastatin (LIPITOR) 40 MG tablet Take 1 tablet by mouth Daily. 10/9/20  Yes Nirmal Calvillo MD   cetirizine (zyrTEC) 10 MG tablet Take 1 tablet by mouth Daily. 10/9/20  Yes Nirmal Calvillo MD   clopidogrel (PLAVIX) 75 MG tablet Take 1 tablet by mouth Daily. 10/9/20  Yes Nirmal Calvillo MD   cyclobenzaprine (FLEXERIL) 10 MG tablet Take 1 tablet by mouth 2 (Two) Times a Day As Needed for Muscle Spasms. 10/9/20  Yes Nirmal Calvillo MD   Empagliflozin (Jardiance) 10 MG tablet Take 10 mg by mouth Every Morning. 9/2/20  Yes Nirmal Calvillo MD   ferrous sulfate (FeroSul) 325 (65 FE) MG tablet Take 1 tablet by mouth Daily With Breakfast. 10/9/20  Yes Nirmal Calvillo MD   gabapentin (NEURONTIN) 800 MG tablet TAKE 1 TABLET BY MOUTH 3 (THREE) TIMES A DAY. FOR NEUROPATHY  12/5/20  Yes Nirmal Calvillo MD   Insulin Glargine (Lantus SoloStar) 100 UNIT/ML injection pen Inject 95 Units under the skin into the appropriate area as directed Every 12 (Twelve) Hours. 9/2/20  Yes Nirmal Calvillo MD   lisinopril (PRINIVIL,ZESTRIL) 10 MG tablet Take 1 tablet by mouth Daily. 10/9/20  Yes Nirmal Calvillo MD   metoprolol tartrate (LOPRESSOR) 100 MG tablet Take 1 tablet by mouth Every 12 (Twelve) Hours for 30 days.  Patient taking differently: Take 100 mg by mouth 2 (Two) Times a Day. 10/9/20 12/18/20 Yes Nirmal Calvillo MD   montelukast (SINGULAIR) 10 MG tablet Take 1 tablet by mouth Every Night. 10/9/20  Yes Nirmal Calvillo MD   nitroglycerin (NITROSTAT) 0.4 MG SL tablet Place 1 tablet under the tongue As Needed for Chest Pain. Take no more than 3 doses in 15 minutes. 10/9/20  Yes Nirmal Calvillo MD   nystatin (MYCOSTATIN) 759650 UNIT/GM powder Apply  topically to the appropriate area as directed 3 (Three) Times a Day. 10/9/20  Yes Nirmal Calvillo MD   oxybutynin XL (DITROPAN-XL) 5 MG 24 hr tablet  9/10/20  Yes Caio Thompson MD   pantoprazole (PROTONIX) 40 MG EC tablet Take 1 tablet by mouth Every Night. 10/9/20  Yes Nirmal Calvillo MD   potassium chloride (MICRO-K) 10 MEQ CR capsule Take 1 capsule by mouth Daily. 10/9/20  Yes Nirmal Calvillo MD   promethazine (PHENERGAN) 25 MG tablet Take 1 tablet by mouth Every 6 (Six) Hours As Needed for Nausea or Vomiting. 10/9/20  Yes Nirmal Calvillo MD   sodium bicarbonate 325 MG tablet Take 1 tablet by mouth 2 (Two) Times a Day. 10/9/20  Yes Nirmal Calvillo MD   Victoza 18 MG/3ML solution pen-injector injection INJECT 1.2 MG UNDER THE SKIN INTO THE APPROPRIATE AREA AS DIRECTED DAILY. 10/28/20  Yes Nirmal Calvillo MD   albuterol (ACCUNEB) 0.63 MG/3ML nebulizer solution Take 3 mL by nebulization Every 6 (Six) Hours As Needed for Wheezing. 10/9/20   Nirmal Calvillo MD   betamethasone  valerate (VALISONE) 0.1 % cream Apply  topically to the appropriate area as directed Daily. 10/9/20   Nirmal Calvillo MD   Blood Glucose Monitoring Suppl ([employer] Blood Glucose Meter) w/Device kit 1 each 3 (Three) Times a Day. 10/9/20   Nirmal Calvillo MD   EASY TOUCH PEN NEEDLES 31G X 8 MM misc  8/7/20   Caio Thompson MD   furosemide (LASIX) 40 MG tablet Take 1 tablet by mouth Daily. 10/9/20   Nirmal Calvilol MD   glucose blood test strip Use as instructed 10/9/20   Nirmal Calivllo MD   Insulin Pen Needle (NovoFine) 30G X 8 MM misc As directed 4 times daily 8/6/20   Nirmal Calvillo MD   ipratropium-albuterol (DUO-NEB) 0.5-2.5 mg/3 ml nebulizer Take 3 mL by nebulization 4 (Four) Times a Day. ICD10 code J96.01 10/9/20   Nirmal Calvillo MD   isosorbide mononitrate (IMDUR) 30 MG 24 hr tablet Take 1 tablet by mouth Daily for 30 days. 10/9/20 11/8/20  Nirmal Calvillo MD   lidocaine (LIDODERM) 5 % PLACE 1 PATCH ON THE SKIN AS DIRECTED BY PROVIDER DAILY. REMOVE & DISCARD PATCH WITHIN 12 HOURS OR AS DIRECTED BY MD 11/17/20   Kit Brar MD   lidocaine (XYLOCAINE) 5 % ointment Apply  topically to the appropriate area as directed Every 2 (Two) Hours As Needed for Mild Pain . 10/9/20   Nirmal Calvillo MD   nicotine (Nicoderm CQ) 21 MG/24HR patch Place 1 patch on the skin as directed by provider Daily. 10/9/20   Nirmal Calvillo MD   ondansetron ODT (Zofran ODT) 4 MG disintegrating tablet Place 1 tablet on the tongue Every 12 (Twelve) Hours As Needed for Nausea or Vomiting. 10/9/20   Nirmal Calvillo MD   SPS 15 GM/60ML suspension  9/3/20   Caio Thompson MD       ALLERGIES:  Adhesive tape and Other    REVIEW OF SYSTEMS  Review of Systems   Constitutional: Negative for chills and fever.   HENT: Negative for congestion and sore throat.    Eyes: Negative for visual disturbance.   Respiratory: Negative for chest tightness and shortness of breath.       Cardiovascular: Negative for chest pain and palpitations.   Gastrointestinal: Positive for abdominal pain, diarrhea and nausea. Negative for vomiting.   Endocrine: Negative for cold intolerance and heat intolerance.   Genitourinary: Positive for frequency.   Musculoskeletal: Negative for arthralgias.   Skin: Negative for rash.   Neurological: Negative for weakness and numbness.   Psychiatric/Behavioral: Negative for agitation, behavioral problems and confusion.       PHYSICAL EXAM:  Temp:  [97.4 °F (36.3 °C)-97.7 °F (36.5 °C)] 97.4 °F (36.3 °C)  Heart Rate:  [77-93] 86  Resp:  [18-20] 20  BP: (137-162)/(70-92) 137/75  Body mass index is 51.89 kg/m².  Physical Exam  Constitutional:       Appearance: She is obese.   HENT:      Head: Normocephalic and atraumatic.      Right Ear: External ear normal.      Left Ear: External ear normal.      Mouth/Throat:      Mouth: Mucous membranes are moist.      Pharynx: Oropharynx is clear. No oropharyngeal exudate or posterior oropharyngeal erythema.   Eyes:      Extraocular Movements: Extraocular movements intact.      Pupils: Pupils are equal, round, and reactive to light.   Neck:      Musculoskeletal: Normal range of motion.   Cardiovascular:      Rate and Rhythm: Normal rate and regular rhythm.      Heart sounds: Normal heart sounds.   Pulmonary:      Breath sounds: Normal breath sounds.      Comments: Distant sounds  Abdominal:      Comments: Hypoactive BS, Epigastric pain on palpation.  No dubon's no Mcburneys.  No rebound.   Skin:     General: Skin is warm and dry.   Neurological:      General: No focal deficit present.      Mental Status: She is alert and oriented to person, place, and time.      Comments: Moving all extremities.   Psychiatric:         Mood and Affect: Mood normal.         Behavior: Behavior normal.         Thought Content: Thought content normal.         Judgment: Judgment normal.         DIAGNOSTIC DATA:   Lab Results (last 24 hours)     Procedure  Component Value Units Date/Time    POC Glucose Once [[id number]]  (Abnormal) Collected: 12/18/20 1618    Specimen: Blood Updated: 12/18/20 1634     Glucose 139 mg/dL      Comment: RN NotifiedOperator: [id number] SHA Rodriguez ID: [id number]       Parnell Draw [[id number]] Collected: 12/18/20 1144    Specimen: Blood Updated: 12/18/20 1245    Narrative:      The following orders were created for panel order Parnell Draw.  Procedure                               Abnormality         Status                     ---------                               -----------         ------                     Light Blue Top[[id number]]                                   Final result               Green Top (Gel)[[id number]]                                  Final result               Lavender Top[[id number]]                                     Final result               Gold Top - SST[[id number]]                                   Final result                 Please view results for these tests on the individual orders.    Light Blue Top [[id number]] Collected: 12/18/20 1144    Specimen: Blood Updated: 12/18/20 1245     Extra Tube hold for add-on     Comment: Auto resulted       Green Top (Gel) [[id number]] Collected: 12/18/20 1144    Specimen: Blood Updated: 12/18/20 1245     Extra Tube Hold for add-ons.     Comment: Auto resulted.       Lavender Top [[id number]] Collected: 12/18/20 1144    Specimen: Blood Updated: 12/18/20 1245     Extra Tube hold for add-on     Comment: Auto resulted       Gold Top - SST [[id number]] Collected: 12/18/20 1144    Specimen: Blood Updated: 12/18/20 1245     Extra Tube Hold for add-ons.     Comment: Auto resulted.       COVID-19 and FLU A/B PCR - Swab, Nasopharynx [[id number]]  (Normal) Collected: 12/18/20 1157    Specimen: Swab from Nasopharynx Updated: 12/18/20 1236     COVID19 Not Detected     Influenza A PCR Not Detected     Influenza B PCR Not Detected    Narrative:      Fact sheet for providers:  https://www.fda.gov/media/064636/download    Fact sheet for patients: https://www.fda.gov/media/161746/download    Test performed by PCR.    Lipase [[id number]]  (Abnormal) Collected: 12/18/20 1144    Specimen: Blood Updated: 12/18/20 1217     Lipase 2,613 U/L     Comprehensive Metabolic Panel [[id number]]  (Abnormal) Collected: 12/18/20 1144    Specimen: Blood Updated: 12/18/20 1210     Glucose 272 mg/dL      BUN 39 mg/dL      Creatinine 2.02 mg/dL      Sodium 135 mmol/L      Potassium 5.2 mmol/L      Chloride 101 mmol/L      CO2 22.0 mmol/L      Calcium 9.8 mg/dL      Total Protein 7.9 g/dL      Albumin 3.70 g/dL      ALT (SGPT) 19 U/L      AST (SGOT) 32 U/L      Alkaline Phosphatase 171 U/L      Total Bilirubin 0.2 mg/dL      eGFR Non African Amer 25 mL/min/1.73      Globulin 4.2 gm/dL      A/G Ratio 0.9 g/dL      BUN/Creatinine Ratio 19.3     Anion Gap 12.0 mmol/L     Narrative:      GFR Normal >60  Chronic Kidney Disease <60  Kidney Failure <15      CBC & Differential [[id number]]  (Abnormal) Collected: 12/18/20 1144    Specimen: Blood Updated: 12/18/20 1150    Narrative:      The following orders were created for panel order CBC & Differential.  Procedure                               Abnormality         Status                     ---------                               -----------         ------                     CBC Auto Differential[[id number]]        Abnormal            Final result                 Please view results for these tests on the individual orders.    CBC Auto Differential [[id number]]  (Abnormal) Collected: 12/18/20 1144    Specimen: Blood Updated: 12/18/20 1150     WBC 12.31 10*3/mm3      RBC 4.39 10*6/mm3      Hemoglobin 12.3 g/dL      Hematocrit 38.6 %      MCV 87.9 fL      MCH 28.0 pg      MCHC 31.9 g/dL      RDW 15.0 %      RDW-SD 48.3 fl      MPV 9.3 fL      Platelets 322 10*3/mm3      Neutrophil % 64.1 %      Lymphocyte % 25.1 %      Monocyte % 5.8 %      Eosinophil % 3.7 %      Basophil %  0.7 %      Immature Grans % 0.6 %      Neutrophils, Absolute 7.89 10*3/mm3      Lymphocytes, Absolute 3.09 10*3/mm3      Monocytes, Absolute 0.71 10*3/mm3      Eosinophils, Absolute 0.45 10*3/mm3      Basophils, Absolute 0.09 10*3/mm3      Immature Grans, Absolute 0.08 10*3/mm3      nRBC 0.0 /100 WBC     Urinalysis With Culture If Indicated - Urine, Clean Catch [[id number]]  (Abnormal) Collected: 12/18/20 1134    Specimen: Urine, Clean Catch Updated: 12/18/20 1145     Color, UA Yellow     Appearance, UA Clear     pH, UA 6.5     Specific Gravity, UA 1.023     Glucose, UA >=1000 mg/dL (3+)     Ketones, UA Negative     Bilirubin, UA Negative     Blood, UA Trace     Protein, UA >=300 mg/dL (3+)     Leuk Esterase, UA Negative     Nitrite, UA Negative     Urobilinogen, UA 0.2 E.U./dL    Urinalysis, Microscopic Only - Urine, Clean Catch [[id number]]  (Abnormal) Collected: 12/18/20 1134    Specimen: Urine, Clean Catch Updated: 12/18/20 1145     RBC, UA 0-2 /HPF      WBC, UA 6-12 /HPF      Bacteria, UA None Seen /HPF      Squamous Epithelial Cells, UA None Seen /HPF      Hyaline Casts, UA None Seen /LPF      Methodology Automated Microscopy    Urine Culture - Urine, Urine, Clean Catch [[id number]] Collected: 12/18/20 1134    Specimen: Urine, Clean Catch Updated: 12/18/20 1145           Imaging Results (Last 24 Hours)     Procedure Component Value Units Date/Time    CT Abdomen Pelvis Without Contrast [[id number]] Collected: 12/18/20 1223     Updated: 12/18/20 1255    Narrative:        PROCEDURE: Ct abdomen and pelvis without contrast    REASON FOR EXAM: abdominal pain/distension    FINDINGS: Comparison study dated  December 12, 2016. Axial  computer tomography sequential imaging was performed from the  diaphragms through the symphysis pubis without IV contrast  administration. Sagittal and coronal reformates was performed.  This exam was performed according to our departmental dose  optimization program, which includes automated  exposure control,  adjustment of the mA and/or KV according to patient size and/or  use of iterative reconstruction technique.     Imaging through lung bases reveals no abnormality.    The liver is normal. The gallbladder is normal. The biliary  system is normal. The pancreas is normal. The spleen is normal.  Bilateral adrenal glands are normal. Right kidney and ureter are  normal. Left kidney and ureter are normal. The bladder is normal.  The uterus is surgically absent. The hollow viscera is normal.  The appendix is identified appears normal. No lymphadenopathy in  the abdomen or the pelvis. Very small shotty left and right  para-aortic lymph nodes all which have a short axis diameter of  less than 1 cm and by strict size criteria would be benign. No  acute osseous abnormality.      Impression:      1. No acute CT abdomen or pelvis abnormality..    Electronically signed by:  Young Johnson MD  12/18/2020 12:54 PM CST  Workstation: [id number]          I reviewed the patient's new clinical results.    ASSESSMENT AND PLAN: This is a 60 y.o. female with:    Active Hospital Problems    Diagnosis POA   • **Acute pancreatitis [K85.90] Yes     Lipase 2613.  CT negative for rogerio, appy.  LFT's wnL.  Ca wnL.  Likely pancreatitis.  Patient is not a etoh abuser, but has hypertriglyceridemia at 388.  Also has a DPP-4 inhibitor.  Likely combination of these two factors.  Acute, no chronic changes found on CT abdomen.  - NPO  - NS @ 150cc/hr  - CMP/CBC daily  - Morphine 4mg IV PRN pain     • Chronic obstructive pulmonary disease (CMS/HCC) [J44.9] Yes     Stable on room air.  - Hold home PO medications  -We'll provide duo nebs 4 times a day while awake and albuterol as needed for wheezing and shortness of air  -We'll provide supplemental oxygen as needed in order to maintain saturation greater than 90%  -Encourage smoking cessation     • Type 2 diabetes mellitus with diabetic polyneuropathy, with long-term current use of insulin  (CMS/[facility]) [E11.42, Z79.4] Not Applicable     Last A1C 9.5 12/4/20.  Poorly controlled.  Patient has gross sugars and proteins.  On glargine 100units QHS QAM  - Levemir 30 units QHS  - 7 Units with meals with high dose SS  - Holding all home diabetes medications     • GERD (gastroesophageal reflux disease) [K21.9] Yes     - IV protonix qday     • Tobacco dependence syndrome [F17.200] Yes     Smokes 1-5 cigarettes per day now.  Used to be 1 pack for 46 years.  - Nicotine patch     • Essential hypertension [I10] Yes     - Hold home PO medications  - Hydralizine 10mg IV PRN sbps >180           DVT prophylaxis:   Mechanical Order History:     None      Pharmalogical Order History:      Ordered     Dose Route Frequency Stop    Signed and Held  enoxaparin (LOVENOX) syringe 40 mg      40 mg SC Every 24 Hours --               Code status is   Code Status and Medical Interventions:   Ordered at: 12/18/20 1439     Level Of Support Discussed With:    Patient     Code Status:    CPR     Medical Interventions (Level of Support Prior to Arrest):    Full        Yamileth Slater and I have discussed pain goals for this hospitalization after reviewing her current clinical condition, medical history and prior pain experiences.  The goal is to keep her  pain level un control.  To help achieve this, I plan to PRN medications.    JULIAN # PDMP, reviewed and consistent with patient reported medications.      I discussed the patient's findings and my recommendations with patient.     Dr. Kit Brar is the attending on record at time of admission, he is aware of the patient's status and agrees with the above history and physical.      This document has been electronically signed by Eduin Griffin MD on December 18, 2020 16:38 CST

## 2020-12-19 PROBLEM — N18.32 STAGE 3B CHRONIC KIDNEY DISEASE: Status: ACTIVE | Noted: 2020-10-05

## 2020-12-19 PROBLEM — N18.9 CKD (CHRONIC KIDNEY DISEASE): Status: ACTIVE | Noted: 2020-10-05

## 2020-12-19 LAB
ALBUMIN SERPL-MCNC: 3 G/DL (ref 3.5–5.2)
ALBUMIN/GLOB SERPL: 0.8 G/DL
ALP SERPL-CCNC: 141 U/L (ref 39–117)
ALT SERPL W P-5'-P-CCNC: 20 U/L (ref 1–33)
ANION GAP SERPL CALCULATED.3IONS-SCNC: 7 MMOL/L (ref 5–15)
AST SERPL-CCNC: 51 U/L (ref 1–32)
BACTERIA SPEC AEROBE CULT: NORMAL
BASOPHILS # BLD AUTO: 0.08 10*3/MM3 (ref 0–0.2)
BASOPHILS NFR BLD AUTO: 0.9 % (ref 0–1.5)
BILIRUB SERPL-MCNC: 0.2 MG/DL (ref 0–1.2)
BUN SERPL-MCNC: 35 MG/DL (ref 8–23)
BUN/CREAT SERPL: 20.6 (ref 7–25)
CALCIUM SPEC-SCNC: 9.3 MG/DL (ref 8.6–10.5)
CHLORIDE SERPL-SCNC: 111 MMOL/L (ref 98–107)
CO2 SERPL-SCNC: 23 MMOL/L (ref 22–29)
CREAT SERPL-MCNC: 1.7 MG/DL (ref 0.57–1)
DEPRECATED RDW RBC AUTO: 48.8 FL (ref 37–54)
EOSINOPHIL # BLD AUTO: 0.46 10*3/MM3 (ref 0–0.4)
EOSINOPHIL NFR BLD AUTO: 5 % (ref 0.3–6.2)
ERYTHROCYTE [DISTWIDTH] IN BLOOD BY AUTOMATED COUNT: 15.2 % (ref 12.3–15.4)
GFR SERPL CREATININE-BSD FRML MDRD: 31 ML/MIN/1.73
GLOBULIN UR ELPH-MCNC: 3.6 GM/DL
GLUCOSE BLDC GLUCOMTR-MCNC: 109 MG/DL (ref 70–130)
GLUCOSE BLDC GLUCOMTR-MCNC: 126 MG/DL (ref 70–130)
GLUCOSE BLDC GLUCOMTR-MCNC: 73 MG/DL (ref 70–130)
GLUCOSE BLDC GLUCOMTR-MCNC: 74 MG/DL (ref 70–130)
GLUCOSE SERPL-MCNC: 69 MG/DL (ref 65–99)
HCT VFR BLD AUTO: 35.4 % (ref 34–46.6)
HGB BLD-MCNC: 11.2 G/DL (ref 12–15.9)
IMM GRANULOCYTES # BLD AUTO: 0.03 10*3/MM3 (ref 0–0.05)
IMM GRANULOCYTES NFR BLD AUTO: 0.3 % (ref 0–0.5)
LYMPHOCYTES # BLD AUTO: 3.04 10*3/MM3 (ref 0.7–3.1)
LYMPHOCYTES NFR BLD AUTO: 33.2 % (ref 19.6–45.3)
MCH RBC QN AUTO: 28 PG (ref 26.6–33)
MCHC RBC AUTO-ENTMCNC: 31.6 G/DL (ref 31.5–35.7)
MCV RBC AUTO: 88.5 FL (ref 79–97)
MONOCYTES # BLD AUTO: 0.62 10*3/MM3 (ref 0.1–0.9)
MONOCYTES NFR BLD AUTO: 6.8 % (ref 5–12)
NEUTROPHILS NFR BLD AUTO: 4.92 10*3/MM3 (ref 1.7–7)
NEUTROPHILS NFR BLD AUTO: 53.8 % (ref 42.7–76)
NRBC BLD AUTO-RTO: 0 /100 WBC (ref 0–0.2)
PLATELET # BLD AUTO: 255 10*3/MM3 (ref 140–450)
PMV BLD AUTO: 9.3 FL (ref 6–12)
POTASSIUM SERPL-SCNC: 4.7 MMOL/L (ref 3.5–5.2)
PROT SERPL-MCNC: 6.6 G/DL (ref 6–8.5)
RBC # BLD AUTO: 4 10*6/MM3 (ref 3.77–5.28)
SODIUM SERPL-SCNC: 141 MMOL/L (ref 136–145)
WBC # BLD AUTO: 9.15 10*3/MM3 (ref 3.4–10.8)

## 2020-12-19 PROCEDURE — 82962 GLUCOSE BLOOD TEST: CPT

## 2020-12-19 PROCEDURE — 80053 COMPREHEN METABOLIC PANEL: CPT | Performed by: STUDENT IN AN ORGANIZED HEALTH CARE EDUCATION/TRAINING PROGRAM

## 2020-12-19 PROCEDURE — 25010000002 ENOXAPARIN PER 10 MG: Performed by: STUDENT IN AN ORGANIZED HEALTH CARE EDUCATION/TRAINING PROGRAM

## 2020-12-19 PROCEDURE — 25010000002 GLUCAGON (HUMAN RECOMBINANT) 1 MG RECONSTITUTED SOLUTION: Performed by: STUDENT IN AN ORGANIZED HEALTH CARE EDUCATION/TRAINING PROGRAM

## 2020-12-19 PROCEDURE — 25010000002 MORPHINE PER 10 MG: Performed by: STUDENT IN AN ORGANIZED HEALTH CARE EDUCATION/TRAINING PROGRAM

## 2020-12-19 PROCEDURE — 25010000002 ONDANSETRON PER 1 MG: Performed by: STUDENT IN AN ORGANIZED HEALTH CARE EDUCATION/TRAINING PROGRAM

## 2020-12-19 PROCEDURE — 94760 N-INVAS EAR/PLS OXIMETRY 1: CPT

## 2020-12-19 PROCEDURE — 99223 1ST HOSP IP/OBS HIGH 75: CPT | Performed by: STUDENT IN AN ORGANIZED HEALTH CARE EDUCATION/TRAINING PROGRAM

## 2020-12-19 PROCEDURE — 94660 CPAP INITIATION&MGMT: CPT

## 2020-12-19 PROCEDURE — 85025 COMPLETE CBC W/AUTO DIFF WBC: CPT | Performed by: STUDENT IN AN ORGANIZED HEALTH CARE EDUCATION/TRAINING PROGRAM

## 2020-12-19 RX ORDER — DEXTROSE AND SODIUM CHLORIDE 5; .9 G/100ML; G/100ML
150 INJECTION, SOLUTION INTRAVENOUS CONTINUOUS
Status: DISCONTINUED | OUTPATIENT
Start: 2020-12-19 | End: 2020-12-20

## 2020-12-19 RX ADMIN — SODIUM CHLORIDE 100 ML/HR: 900 INJECTION, SOLUTION INTRAVENOUS at 01:27

## 2020-12-19 RX ADMIN — MONTELUKAST SODIUM 10 MG: 10 TABLET, COATED ORAL at 22:00

## 2020-12-19 RX ADMIN — METOPROLOL TARTRATE 100 MG: 50 TABLET, FILM COATED ORAL at 08:48

## 2020-12-19 RX ADMIN — MORPHINE SULFATE 4 MG: 4 INJECTION, SOLUTION INTRAMUSCULAR; INTRAVENOUS at 01:27

## 2020-12-19 RX ADMIN — CLOPIDOGREL BISULFATE 75 MG: 75 TABLET ORAL at 08:48

## 2020-12-19 RX ADMIN — SODIUM CHLORIDE, PRESERVATIVE FREE 10 ML: 5 INJECTION INTRAVENOUS at 08:50

## 2020-12-19 RX ADMIN — GLUCAGON HYDROCHLORIDE 1 MG: 1 INJECTION, POWDER, FOR SOLUTION INTRAMUSCULAR; INTRAVENOUS; SUBCUTANEOUS at 06:22

## 2020-12-19 RX ADMIN — MORPHINE SULFATE 4 MG: 4 INJECTION, SOLUTION INTRAMUSCULAR; INTRAVENOUS at 15:02

## 2020-12-19 RX ADMIN — ATORVASTATIN CALCIUM 40 MG: 40 TABLET, FILM COATED ORAL at 08:48

## 2020-12-19 RX ADMIN — DEXTROSE AND SODIUM CHLORIDE 150 ML/HR: 5; 900 INJECTION, SOLUTION INTRAVENOUS at 22:03

## 2020-12-19 RX ADMIN — NICOTINE 1 PATCH: 14 PATCH, EXTENDED RELEASE TRANSDERMAL at 16:21

## 2020-12-19 RX ADMIN — DEXTROSE AND SODIUM CHLORIDE 150 ML/HR: 5; 900 INJECTION, SOLUTION INTRAVENOUS at 15:02

## 2020-12-19 RX ADMIN — ISOSORBIDE MONONITRATE 30 MG: 30 TABLET, EXTENDED RELEASE ORAL at 08:48

## 2020-12-19 RX ADMIN — SODIUM BICARBONATE TAB 325 MG 325 MG: 325 TAB at 08:48

## 2020-12-19 RX ADMIN — SODIUM CHLORIDE 150 ML/HR: 900 INJECTION, SOLUTION INTRAVENOUS at 09:18

## 2020-12-19 RX ADMIN — METOPROLOL TARTRATE 100 MG: 50 TABLET, FILM COATED ORAL at 22:00

## 2020-12-19 RX ADMIN — GABAPENTIN 400 MG: 400 CAPSULE ORAL at 08:48

## 2020-12-19 RX ADMIN — ONDANSETRON 4 MG: 2 SOLUTION INTRAMUSCULAR; INTRAVENOUS at 21:55

## 2020-12-19 RX ADMIN — SODIUM CHLORIDE, PRESERVATIVE FREE 10 ML: 5 INJECTION INTRAVENOUS at 21:53

## 2020-12-19 RX ADMIN — ENOXAPARIN SODIUM 40 MG: 40 INJECTION SUBCUTANEOUS at 16:24

## 2020-12-19 RX ADMIN — CETIRIZINE HYDROCHLORIDE 10 MG: 10 TABLET, FILM COATED ORAL at 08:48

## 2020-12-19 RX ADMIN — GABAPENTIN 400 MG: 400 CAPSULE ORAL at 22:00

## 2020-12-19 RX ADMIN — ASPIRIN 81 MG: 81 TABLET, COATED ORAL at 08:48

## 2020-12-19 RX ADMIN — MORPHINE SULFATE 4 MG: 4 INJECTION, SOLUTION INTRAMUSCULAR; INTRAVENOUS at 10:45

## 2020-12-19 RX ADMIN — PANTOPRAZOLE SODIUM 40 MG: 40 INJECTION, POWDER, FOR SOLUTION INTRAVENOUS at 05:13

## 2020-12-19 RX ADMIN — SODIUM BICARBONATE TAB 325 MG 325 MG: 325 TAB at 22:00

## 2020-12-19 NOTE — PLAN OF CARE
Goal Outcome Evaluation:  Plan of Care Reviewed With: patient      Pt still has c/o abdominal and back pain to the right side but its manageable.pt states overall since admission she feels a lot better.no c/o nausea will cont to monitor

## 2020-12-19 NOTE — PROGRESS NOTES
FAMILY MEDICINE DAILY PROGRESS NOTE    NAME: Yamileth Slater  : 1960  MRN: [id number]      LOS: 1 day     PROVIDER OF SERVICE: Haily Mcneil MD    Chief Complaint: Acute pancreatitis    Subjective:   Patient Seen At: 0710    Interval History:  History taken from: patient  Patient Complaints: Abdominal pain    Interval Hx:  Patient still complains of abdominal pain. Controlled well with morphine. Continue NPO, and mIVF.     Review of Systems:   Review of Systems   Constitutional: Negative for chills and fever.   HENT: Negative for rhinorrhea and sore throat.    Eyes: Negative for photophobia and visual disturbance.   Respiratory: Negative for cough and shortness of breath.    Cardiovascular: Negative for chest pain and palpitations.   Gastrointestinal: Positive for abdominal distention and abdominal pain. Negative for nausea and vomiting.   Genitourinary: Negative for difficulty urinating and flank pain.   Musculoskeletal: Negative for arthralgias and back pain.   Neurological: Negative for dizziness and headaches.   Psychiatric/Behavioral: Negative for confusion. The patient is not nervous/anxious.        Objective:     Vital Signs  Temp:  [96.4 °F (35.8 °C)-97.7 °F (36.5 °C)] 96.4 °F (35.8 °C)  Heart Rate:  [] 79  Resp:  [17-20] 18  BP: (113-162)/(55-92) 113/55  Body mass index is 51.32 kg/m².    Physical Exam  Physical Exam  Vitals signs reviewed.   Constitutional:       Appearance: She is well-developed. She is morbidly obese.      Interventions: Nasal cannula in place.   HENT:      Head: Normocephalic.      Right Ear: Hearing and external ear normal.      Left Ear: Hearing and external ear normal.      Nose: Nose normal.      Mouth/Throat:      Lips: Pink.      Mouth: Mucous membranes are moist.      Pharynx: Oropharynx is clear. Uvula midline.   Eyes:      General: Lids are normal.      Conjunctiva/sclera: Conjunctivae normal.      Pupils: Pupils are equal, round, and reactive to light.      Cardiovascular:      Rate and Rhythm: Normal rate and regular rhythm.      Pulses: Normal pulses.           Dorsalis pedis pulses are 2+ on the right side and 2+ on the left side.        Posterior tibial pulses are 2+ on the right side and 2+ on the left side.      Heart sounds: Normal heart sounds. No murmur. No friction rub. No gallop.    Pulmonary:      Effort: Pulmonary effort is normal.      Breath sounds: Normal breath sounds. No wheezing, rhonchi or rales.   Abdominal:      General: Abdomen is protuberant. Bowel sounds are normal. There is distension.      Palpations: Abdomen is soft.      Tenderness: There is generalized abdominal tenderness. There is no guarding.   Musculoskeletal:      Right lower leg: No edema.      Left lower leg: No edema.   Skin:     General: Skin is warm.   Neurological:      Mental Status: She is alert and oriented to person, place, and time.   Psychiatric:         Attention and Perception: Attention and perception normal.         Mood and Affect: Mood and affect normal.         Speech: Speech normal.         Behavior: Behavior normal.         Thought Content: Thought content normal.         Cognition and Memory: Cognition and memory normal.         Judgment: Judgment normal.         Medication Review    Current Facility-Administered Medications:   •  albuterol sulfate HFA (PROVENTIL HFA;VENTOLIN HFA;PROAIR HFA) inhaler 2 puff, 2 puff, Inhalation, Q4H PRN, Eduin Griffin MD  •  aspirin EC tablet 81 mg, 81 mg, Oral, Daily, Eduin Griffin MD, 81 mg at 12/19/20 0848  •  atorvastatin (LIPITOR) tablet 40 mg, 40 mg, Oral, Daily, Eduin Griffin MD, 40 mg at 12/19/20 0848  •  cetirizine (zyrTEC) tablet 10 mg, 10 mg, Oral, Daily, Eduin Griffin MD, 10 mg at 12/19/20 0848  •  clopidogrel (PLAVIX) tablet 75 mg, 75 mg, Oral, Daily, Eduin Griffin MD, 75 mg at 12/19/20 0848  •  cyclobenzaprine (FLEXERIL) tablet 10 mg, 10 mg, Oral, BID PRN, Eduin Griffin MD  •  dextrose (D50W) 25 g/ 50mL Intravenous Solution 25  g, 25 g, Intravenous, Q15 Min PRN, Eduin Griffin MD  •  dextrose (GLUTOSE) oral gel 15 g, 15 g, Oral, Q15 Min PRN, Eduin Griffin MD  •  enoxaparin (LOVENOX) syringe 40 mg, 40 mg, Subcutaneous, Q24H, Eduin Griffin MD, 40 mg at 12/18/20 1638  •  gabapentin (NEURONTIN) capsule 400 mg, 400 mg, Oral, Q12H, Eduin Griffin MD, 400 mg at 12/19/20 0848  •  glucagon (human recombinant) (GLUCAGEN DIAGNOSTIC) injection 1 mg, 1 mg, Subcutaneous, Q15 Min PRN, Eduin Griffin MD, 1 mg at 12/19/20 0622  •  hydrALAZINE (APRESOLINE) injection 10 mg, 10 mg, Intravenous, Q6H PRN, Eduin Griffin MD  •  insulin aspart (novoLOG) injection 0-24 Units, 0-24 Units, Subcutaneous, TID AC, Eduin Griffin MD  •  insulin aspart (novoLOG) injection 7 Units, 7 Units, Subcutaneous, TID With Meals, Eduin Griffin MD  •  insulin detemir (LEVEMIR) injection 30 Units, 30 Units, Subcutaneous, Nightly, Eduin Griffin MD  •  isosorbide mononitrate (IMDUR) 24 hr tablet 30 mg, 30 mg, Oral, Q24H, Eduin Griffin MD, 30 mg at 12/19/20 0848  •  metoprolol tartrate (LOPRESSOR) tablet 100 mg, 100 mg, Oral, Q12H, Eduin Griffin MD, 100 mg at 12/19/20 0848  •  montelukast (SINGULAIR) tablet 10 mg, 10 mg, Oral, Nightly, Eduin Griffin MD, 10 mg at 12/18/20 2106  •  morphine injection 4 mg, 4 mg, Intravenous, Q4H PRN, 4 mg at 12/19/20 0127 **AND** naloxone (NARCAN) injection 0.4 mg, 0.4 mg, Intravenous, Q5 Min PRN, Eduin Griffin MD  •  nicotine (NICODERM CQ) 14 MG/24HR patch 1 patch, 1 patch, Transdermal, Q24H, Eduin Griffin MD, 1 patch at 12/18/20 1638  •  ondansetron (ZOFRAN) injection 4 mg, 4 mg, Intravenous, Q6H PRN, Eduin Griffin MD, 4 mg at 12/18/20 2053  •  pantoprazole (PROTONIX) injection 40 mg, 40 mg, Intravenous, Q AM, Eduin Griffin MD, 40 mg at 12/19/20 0513  •  sodium bicarbonate tablet 325 mg, 325 mg, Oral, BID, Eduin Griffin MD, 325 mg at 12/19/20 0848  •  sodium chloride 0.9 % flush 10 mL, 10 mL, Intravenous, PRN, Bebeto Coyle PA-C  •  sodium chloride 0.9 % flush 10 mL, 10 mL,  Intravenous, Q12H, Eduin Griffin MD, 10 mL at 12/19/20 0850  •  sodium chloride 0.9 % flush 10 mL, 10 mL, Intravenous, PRN, Eduin Griffin MD  •  sodium chloride 0.9 % infusion, 150 mL/hr, Intravenous, Continuous, Mone Us MD, Last Rate: 150 mL/hr at 12/19/20 0709, 150 mL/hr at 12/19/20 0709     Diagnostic Data    Lab Results (last 24 hours)     Procedure Component Value Units Date/Time    Comprehensive Metabolic Panel [[id number]]  (Abnormal) Collected: 12/19/20 0602    Specimen: Blood Updated: 12/19/20 0636     Glucose 69 mg/dL      BUN 35 mg/dL      Creatinine 1.70 mg/dL      Sodium 141 mmol/L      Potassium 4.7 mmol/L      Chloride 111 mmol/L      CO2 23.0 mmol/L      Calcium 9.3 mg/dL      Total Protein 6.6 g/dL      Albumin 3.00 g/dL      ALT (SGPT) 20 U/L      AST (SGOT) 51 U/L      Alkaline Phosphatase 141 U/L      Total Bilirubin 0.2 mg/dL      eGFR Non African Amer 31 mL/min/1.73      Globulin 3.6 gm/dL      A/G Ratio 0.8 g/dL      BUN/Creatinine Ratio 20.6     Anion Gap 7.0 mmol/L     Narrative:      GFR Normal >60  Chronic Kidney Disease <60  Kidney Failure <15      POC Glucose Once [[id number]]  (Normal) Collected: 12/19/20 0607    Specimen: Blood Updated: 12/19/20 0631     Glucose 73 mg/dL      Comment: : [id number] PAUL CINDYMeter ID: [id number]       CBC Auto Differential [[id number]]  (Abnormal) Collected: 12/19/20 0602    Specimen: Blood Updated: 12/19/20 0612     WBC 9.15 10*3/mm3      RBC 4.00 10*6/mm3      Hemoglobin 11.2 g/dL      Hematocrit 35.4 %      MCV 88.5 fL      MCH 28.0 pg      MCHC 31.6 g/dL      RDW 15.2 %      RDW-SD 48.8 fl      MPV 9.3 fL      Platelets 255 10*3/mm3      Neutrophil % 53.8 %      Lymphocyte % 33.2 %      Monocyte % 6.8 %      Eosinophil % 5.0 %      Basophil % 0.9 %      Immature Grans % 0.3 %      Neutrophils, Absolute 4.92 10*3/mm3      Lymphocytes, Absolute 3.04 10*3/mm3      Monocytes, Absolute 0.62 10*3/mm3      Eosinophils, Absolute 0.46 10*3/mm3       Basophils, Absolute 0.08 10*3/mm3      Immature Grans, Absolute 0.03 10*3/mm3      nRBC 0.0 /100 WBC     POC Glucose Once [[id number]]  (Normal) Collected: 12/18/20 2030    Specimen: Blood Updated: 12/18/20 2044     Glucose 88 mg/dL      Comment: : [id number] IKE Enciso ID: [id number]       POC Glucose Once [[id number]]  (Abnormal) Collected: 12/18/20 1618    Specimen: Blood Updated: 12/18/20 1634     Glucose 139 mg/dL      Comment: RN NotifiedOperator: [id number] SHA Rodriguez ID: [id number]       [place] Draw [[id number]] Collected: 12/18/20 1144    Specimen: Blood Updated: 12/18/20 1245    Narrative:      The following orders were created for panel order [place] [facility].  Procedure                               Abnormality         Status                     ---------                               -----------         ------                     Light Blue Top[[id number]]                                   Final result               Green Top (Gel)[[id number]]                                  Final result               Lavender Top[[id number]]                                     Final result               Gold Top - SST[[id number]]                                   Final result                 Please view results for these tests on the individual orders.    Light Blue Top [[id number]] Collected: 12/18/20 1144    Specimen: Blood Updated: 12/18/20 1245     Extra Tube hold for add-on     Comment: Auto resulted       Green Top (Gel) [[id number]] Collected: 12/18/20 1144    Specimen: Blood Updated: 12/18/20 1245     Extra Tube Hold for add-ons.     Comment: Auto resulted.       Lavender Top [[id number]] Collected: 12/18/20 1144    Specimen: Blood Updated: 12/18/20 1245     Extra Tube hold for add-on     Comment: Auto resulted       Gold Top - SST [[id number]] Collected: 12/18/20 1144    Specimen: Blood Updated: 12/18/20 1245     Extra Tube Hold for add-ons.     Comment: Auto resulted.       COVID-19 and  FLU A/B PCR - Swab, Nasopharynx [[id number]]  (Normal) Collected: 12/18/20 1157    Specimen: Swab from Nasopharynx Updated: 12/18/20 1236     COVID19 Not Detected     Influenza A PCR Not Detected     Influenza B PCR Not Detected    Narrative:      Fact sheet for providers: https://www.fda.gov/media/117471/download    Fact sheet for patients: https://www.fda.gov/media/548177/download    Test performed by PCR.    Lipase [[id number]]  (Abnormal) Collected: 12/18/20 1144    Specimen: Blood Updated: 12/18/20 1217     Lipase 2,613 U/L     Comprehensive Metabolic Panel [[id number]]  (Abnormal) Collected: 12/18/20 1144    Specimen: Blood Updated: 12/18/20 1210     Glucose 272 mg/dL      BUN 39 mg/dL      Creatinine 2.02 mg/dL      Sodium 135 mmol/L      Potassium 5.2 mmol/L      Chloride 101 mmol/L      CO2 22.0 mmol/L      Calcium 9.8 mg/dL      Total Protein 7.9 g/dL      Albumin 3.70 g/dL      ALT (SGPT) 19 U/L      AST (SGOT) 32 U/L      Alkaline Phosphatase 171 U/L      Total Bilirubin 0.2 mg/dL      eGFR Non African Amer 25 mL/min/1.73      Globulin 4.2 gm/dL      A/G Ratio 0.9 g/dL      BUN/Creatinine Ratio 19.3     Anion Gap 12.0 mmol/L     Narrative:      GFR Normal >60  Chronic Kidney Disease <60  Kidney Failure <15      CBC & Differential [[id number]]  (Abnormal) Collected: 12/18/20 1144    Specimen: Blood Updated: 12/18/20 1150    Narrative:      The following orders were created for panel order CBC & Differential.  Procedure                               Abnormality         Status                     ---------                               -----------         ------                     CBC Auto Differential[[id number]]        Abnormal            Final result                 Please view results for these tests on the individual orders.    CBC Auto Differential [[id number]]  (Abnormal) Collected: 12/18/20 1144    Specimen: Blood Updated: 12/18/20 1150     WBC 12.31 10*3/mm3      RBC 4.39 10*6/mm3      Hemoglobin 12.3  g/dL      Hematocrit 38.6 %      MCV 87.9 fL      MCH 28.0 pg      MCHC 31.9 g/dL      RDW 15.0 %      RDW-SD 48.3 fl      MPV 9.3 fL      Platelets 322 10*3/mm3      Neutrophil % 64.1 %      Lymphocyte % 25.1 %      Monocyte % 5.8 %      Eosinophil % 3.7 %      Basophil % 0.7 %      Immature Grans % 0.6 %      Neutrophils, Absolute 7.89 10*3/mm3      Lymphocytes, Absolute 3.09 10*3/mm3      Monocytes, Absolute 0.71 10*3/mm3      Eosinophils, Absolute 0.45 10*3/mm3      Basophils, Absolute 0.09 10*3/mm3      Immature Grans, Absolute 0.08 10*3/mm3      nRBC 0.0 /100 WBC     Urinalysis With Culture If Indicated - Urine, Clean Catch [[id number]]  (Abnormal) Collected: 12/18/20 1134    Specimen: Urine, Clean Catch Updated: 12/18/20 1145     Color, UA Yellow     Appearance, UA Clear     pH, UA 6.5     Specific Gravity, UA 1.023     Glucose, UA >=1000 mg/dL (3+)     Ketones, UA Negative     Bilirubin, UA Negative     Blood, UA Trace     Protein, UA >=300 mg/dL (3+)     Leuk Esterase, UA Negative     Nitrite, UA Negative     Urobilinogen, UA 0.2 E.U./dL    Urinalysis, Microscopic Only - Urine, Clean Catch [[id number]]  (Abnormal) Collected: 12/18/20 1134    Specimen: Urine, Clean Catch Updated: 12/18/20 1145     RBC, UA 0-2 /HPF      WBC, UA 6-12 /HPF      Bacteria, UA None Seen /HPF      Squamous Epithelial Cells, UA None Seen /HPF      Hyaline Casts, UA None Seen /LPF      Methodology Automated Microscopy    Urine Culture - Urine, Urine, Clean Catch [[id number]] Collected: 12/18/20 1134    Specimen: Urine, Clean Catch Updated: 12/18/20 1145            I reviewed the patient's new clinical results.  Discussed with Dr. Mone Us    Assessment/Plan:     Active Hospital Problems    Diagnosis POA   • **Acute pancreatitis [K85.90] Yes     Lipase 2613.  CT negative for rogerio, appy.  LFT's wnL.  Ca wnL.  Likely pancreatitis.  Patient is not a etoh abuser, but has hypertriglyceridemia at 388.  Also has a DPP-4 inhibitor.  Likely  combination of these two factors.  Acute, no chronic changes found on CT abdomen.  - NPO  - NS @ 150cc/hr  - CMP/CBC daily  - Morphine 4mg IV PRN pain     • Stage 3b chronic kidney disease [N18.32] Yes     -Continue home medications  -We will continue to monitor. Baseline Cr ~ 1.49 GFR 36 back in September 2020  -Today 1.70 GFR 31  - Hold nephrotoxic agents, lisinopril  -Daily CMPs     • Chronic obstructive pulmonary disease (CMS/[facility]) [J44.9] Yes     Stable on room air.  - Hold home PO medications  -We'll provide duo nebs 4 times a day while awake and albuterol as needed for wheezing and shortness of air  -We'll provide supplemental oxygen as needed in order to maintain saturation greater than 90%  -Encourage smoking cessation     • Type 2 diabetes mellitus with diabetic polyneuropathy, with long-term current use of insulin (CMS/[facility]) [E11.42, Z79.4] Not Applicable     Last A1C 9.5 12/4/20.  Poorly controlled.  Patient has gross sugars and proteins.  On glargine 100units QHS QAM  - Levemir 30 units QHS  - 7 Units with meals with high dose SS  - Holding all home diabetes medications     • GERD (gastroesophageal reflux disease) [K21.9] Yes     - IV protonix qday     • Tobacco dependence syndrome [F17.200] Yes     Smokes 1-5 cigarettes per day now.  Used to be 1 pack for 46 years.  - Nicotine patch     • Essential hypertension [I10] Yes     - Hold home PO medications  - Hydralizine 10mg IV PRN sbps >180         DVT prophylaxis:   Mechanical Order History:     None      Pharmalogical Order History:      Ordered     Dose Route Frequency Stop    12/18/20 1539  enoxaparin (LOVENOX) syringe 40 mg      40 mg SC Every 24 Hours --               Code status is   Code Status and Medical Interventions:   Ordered at: 12/18/20 1439     Level Of Support Discussed With:    Patient     Code Status:    CPR     Medical Interventions (Level of Support Prior to Arrest):    Full       Plan for disposition:Where: home and When:  3  days      Time: 15 minutes        This document has been electronically signed by Haily Mcneil MD on December 19, 2020 09:01 CST     Part of this note may be an electronic transcription/translation of spoken language to printed text using the Dragon Dictation System.

## 2020-12-20 LAB
ALBUMIN SERPL-MCNC: 3 G/DL (ref 3.5–5.2)
ALBUMIN/GLOB SERPL: 0.8 G/DL
ALP SERPL-CCNC: 125 U/L (ref 39–117)
ALT SERPL W P-5'-P-CCNC: 21 U/L (ref 1–33)
ANION GAP SERPL CALCULATED.3IONS-SCNC: 5 MMOL/L (ref 5–15)
AST SERPL-CCNC: 51 U/L (ref 1–32)
BASOPHILS # BLD AUTO: 0.06 10*3/MM3 (ref 0–0.2)
BASOPHILS NFR BLD AUTO: 0.8 % (ref 0–1.5)
BILIRUB SERPL-MCNC: 0.2 MG/DL (ref 0–1.2)
BUN SERPL-MCNC: 33 MG/DL (ref 8–23)
BUN/CREAT SERPL: 17.1 (ref 7–25)
CALCIUM SPEC-SCNC: 8.7 MG/DL (ref 8.6–10.5)
CHLORIDE SERPL-SCNC: 112 MMOL/L (ref 98–107)
CO2 SERPL-SCNC: 22 MMOL/L (ref 22–29)
CREAT SERPL-MCNC: 1.93 MG/DL (ref 0.57–1)
DEPRECATED RDW RBC AUTO: 50.8 FL (ref 37–54)
EOSINOPHIL # BLD AUTO: 0.42 10*3/MM3 (ref 0–0.4)
EOSINOPHIL NFR BLD AUTO: 5.4 % (ref 0.3–6.2)
ERYTHROCYTE [DISTWIDTH] IN BLOOD BY AUTOMATED COUNT: 15.2 % (ref 12.3–15.4)
GFR SERPL CREATININE-BSD FRML MDRD: 26 ML/MIN/1.73
GLOBULIN UR ELPH-MCNC: 3.8 GM/DL
GLUCOSE BLDC GLUCOMTR-MCNC: 170 MG/DL (ref 70–130)
GLUCOSE BLDC GLUCOMTR-MCNC: 200 MG/DL (ref 70–130)
GLUCOSE BLDC GLUCOMTR-MCNC: 220 MG/DL (ref 70–130)
GLUCOSE BLDC GLUCOMTR-MCNC: 270 MG/DL (ref 70–130)
GLUCOSE SERPL-MCNC: 174 MG/DL (ref 65–99)
HCT VFR BLD AUTO: 35.6 % (ref 34–46.6)
HGB BLD-MCNC: 10.8 G/DL (ref 12–15.9)
IMM GRANULOCYTES # BLD AUTO: 0.05 10*3/MM3 (ref 0–0.05)
IMM GRANULOCYTES NFR BLD AUTO: 0.6 % (ref 0–0.5)
LIPASE SERPL-CCNC: 176 U/L (ref 13–60)
LYMPHOCYTES # BLD AUTO: 2.52 10*3/MM3 (ref 0.7–3.1)
LYMPHOCYTES NFR BLD AUTO: 32.6 % (ref 19.6–45.3)
MCH RBC QN AUTO: 27.5 PG (ref 26.6–33)
MCHC RBC AUTO-ENTMCNC: 30.3 G/DL (ref 31.5–35.7)
MCV RBC AUTO: 90.6 FL (ref 79–97)
MONOCYTES # BLD AUTO: 0.57 10*3/MM3 (ref 0.1–0.9)
MONOCYTES NFR BLD AUTO: 7.4 % (ref 5–12)
NEUTROPHILS NFR BLD AUTO: 4.11 10*3/MM3 (ref 1.7–7)
NEUTROPHILS NFR BLD AUTO: 53.2 % (ref 42.7–76)
NRBC BLD AUTO-RTO: 0 /100 WBC (ref 0–0.2)
PLATELET # BLD AUTO: 251 10*3/MM3 (ref 140–450)
PMV BLD AUTO: 9.3 FL (ref 6–12)
POTASSIUM SERPL-SCNC: 4.8 MMOL/L (ref 3.5–5.2)
PROT SERPL-MCNC: 6.8 G/DL (ref 6–8.5)
RBC # BLD AUTO: 3.93 10*6/MM3 (ref 3.77–5.28)
SODIUM SERPL-SCNC: 139 MMOL/L (ref 136–145)
WBC # BLD AUTO: 7.73 10*3/MM3 (ref 3.4–10.8)

## 2020-12-20 PROCEDURE — 63710000001 INSULIN ASPART PER 5 UNITS: Performed by: STUDENT IN AN ORGANIZED HEALTH CARE EDUCATION/TRAINING PROGRAM

## 2020-12-20 PROCEDURE — 82962 GLUCOSE BLOOD TEST: CPT

## 2020-12-20 PROCEDURE — 80053 COMPREHEN METABOLIC PANEL: CPT | Performed by: STUDENT IN AN ORGANIZED HEALTH CARE EDUCATION/TRAINING PROGRAM

## 2020-12-20 PROCEDURE — 25010000002 MORPHINE PER 10 MG: Performed by: STUDENT IN AN ORGANIZED HEALTH CARE EDUCATION/TRAINING PROGRAM

## 2020-12-20 PROCEDURE — 99232 SBSQ HOSP IP/OBS MODERATE 35: CPT | Performed by: STUDENT IN AN ORGANIZED HEALTH CARE EDUCATION/TRAINING PROGRAM

## 2020-12-20 PROCEDURE — 94799 UNLISTED PULMONARY SVC/PX: CPT

## 2020-12-20 PROCEDURE — 25010000002 ONDANSETRON PER 1 MG: Performed by: STUDENT IN AN ORGANIZED HEALTH CARE EDUCATION/TRAINING PROGRAM

## 2020-12-20 PROCEDURE — 85025 COMPLETE CBC W/AUTO DIFF WBC: CPT | Performed by: STUDENT IN AN ORGANIZED HEALTH CARE EDUCATION/TRAINING PROGRAM

## 2020-12-20 PROCEDURE — 63710000001 INSULIN DETEMIR PER 5 UNITS: Performed by: STUDENT IN AN ORGANIZED HEALTH CARE EDUCATION/TRAINING PROGRAM

## 2020-12-20 PROCEDURE — 25010000002 ENOXAPARIN PER 10 MG: Performed by: STUDENT IN AN ORGANIZED HEALTH CARE EDUCATION/TRAINING PROGRAM

## 2020-12-20 PROCEDURE — 94660 CPAP INITIATION&MGMT: CPT

## 2020-12-20 PROCEDURE — 83690 ASSAY OF LIPASE: CPT | Performed by: STUDENT IN AN ORGANIZED HEALTH CARE EDUCATION/TRAINING PROGRAM

## 2020-12-20 RX ORDER — DEXTROSE AND SODIUM CHLORIDE 5; .45 G/100ML; G/100ML
150 INJECTION, SOLUTION INTRAVENOUS CONTINUOUS
Status: DISCONTINUED | OUTPATIENT
Start: 2020-12-20 | End: 2020-12-21

## 2020-12-20 RX ORDER — AMOXICILLIN 250 MG
1 CAPSULE ORAL NIGHTLY
Status: DISCONTINUED | OUTPATIENT
Start: 2020-12-20 | End: 2020-12-22 | Stop reason: HOSPADM

## 2020-12-20 RX ADMIN — INSULIN ASPART 8 UNITS: 100 INJECTION, SOLUTION INTRAVENOUS; SUBCUTANEOUS at 11:11

## 2020-12-20 RX ADMIN — INSULIN DETEMIR 30 UNITS: 100 INJECTION, SOLUTION SUBCUTANEOUS at 21:34

## 2020-12-20 RX ADMIN — MORPHINE SULFATE 4 MG: 4 INJECTION, SOLUTION INTRAMUSCULAR; INTRAVENOUS at 17:29

## 2020-12-20 RX ADMIN — SODIUM BICARBONATE TAB 325 MG 325 MG: 325 TAB at 21:26

## 2020-12-20 RX ADMIN — ENOXAPARIN SODIUM 40 MG: 40 INJECTION SUBCUTANEOUS at 16:11

## 2020-12-20 RX ADMIN — DOCUSATE SODIUM 50 MG AND SENNOSIDES 8.6 MG 1 TABLET: 8.6; 5 TABLET, FILM COATED ORAL at 21:32

## 2020-12-20 RX ADMIN — GABAPENTIN 400 MG: 400 CAPSULE ORAL at 08:14

## 2020-12-20 RX ADMIN — INSULIN ASPART 8 UNITS: 100 INJECTION, SOLUTION INTRAVENOUS; SUBCUTANEOUS at 17:23

## 2020-12-20 RX ADMIN — INSULIN ASPART 4 UNITS: 100 INJECTION, SOLUTION INTRAVENOUS; SUBCUTANEOUS at 08:14

## 2020-12-20 RX ADMIN — CLOPIDOGREL BISULFATE 75 MG: 75 TABLET ORAL at 08:14

## 2020-12-20 RX ADMIN — SODIUM BICARBONATE TAB 325 MG 325 MG: 325 TAB at 08:13

## 2020-12-20 RX ADMIN — MORPHINE SULFATE 4 MG: 4 INJECTION, SOLUTION INTRAMUSCULAR; INTRAVENOUS at 10:51

## 2020-12-20 RX ADMIN — MONTELUKAST SODIUM 10 MG: 10 TABLET, COATED ORAL at 21:32

## 2020-12-20 RX ADMIN — ASPIRIN 81 MG: 81 TABLET, COATED ORAL at 08:14

## 2020-12-20 RX ADMIN — DEXTROSE AND SODIUM CHLORIDE 150 ML/HR: 5; 900 INJECTION, SOLUTION INTRAVENOUS at 05:09

## 2020-12-20 RX ADMIN — CYCLOBENZAPRINE 10 MG: 10 TABLET, FILM COATED ORAL at 17:29

## 2020-12-20 RX ADMIN — METOPROLOL TARTRATE 100 MG: 50 TABLET, FILM COATED ORAL at 21:32

## 2020-12-20 RX ADMIN — ATORVASTATIN CALCIUM 40 MG: 40 TABLET, FILM COATED ORAL at 08:13

## 2020-12-20 RX ADMIN — PANTOPRAZOLE SODIUM 40 MG: 40 INJECTION, POWDER, FOR SOLUTION INTRAVENOUS at 05:09

## 2020-12-20 RX ADMIN — METOPROLOL TARTRATE 100 MG: 50 TABLET, FILM COATED ORAL at 08:14

## 2020-12-20 RX ADMIN — CETIRIZINE HYDROCHLORIDE 10 MG: 10 TABLET, FILM COATED ORAL at 08:14

## 2020-12-20 RX ADMIN — MORPHINE SULFATE 4 MG: 4 INJECTION, SOLUTION INTRAMUSCULAR; INTRAVENOUS at 21:32

## 2020-12-20 RX ADMIN — ONDANSETRON 4 MG: 2 SOLUTION INTRAMUSCULAR; INTRAVENOUS at 12:04

## 2020-12-20 RX ADMIN — GABAPENTIN 400 MG: 400 CAPSULE ORAL at 21:32

## 2020-12-20 RX ADMIN — ISOSORBIDE MONONITRATE 30 MG: 30 TABLET, EXTENDED RELEASE ORAL at 08:14

## 2020-12-20 RX ADMIN — DEXTROSE AND SODIUM CHLORIDE 150 ML/HR: 5; 450 INJECTION, SOLUTION INTRAVENOUS at 08:27

## 2020-12-20 RX ADMIN — SODIUM CHLORIDE, PRESERVATIVE FREE 10 ML: 5 INJECTION INTRAVENOUS at 08:14

## 2020-12-20 RX ADMIN — DEXTROSE AND SODIUM CHLORIDE 150 ML/HR: 5; 450 INJECTION, SOLUTION INTRAVENOUS at 15:11

## 2020-12-20 RX ADMIN — NICOTINE 1 PATCH: 14 PATCH, EXTENDED RELEASE TRANSDERMAL at 16:12

## 2020-12-20 NOTE — PLAN OF CARE
Goal Outcome Evaluation:  Plan of Care Reviewed With: patient  Progress: improving   Pt has done well today.tolerates liquids fine.will cont to monitor

## 2020-12-20 NOTE — PROGRESS NOTES
FAMILY MEDICINE DAILY PROGRESS NOTE  NAME: Yamileth Slater  : 1960  MRN: [id number]     LOS: 2 days     PROVIDER OF SERVICE: Nirmal Calvillo MD    Chief Complaint: Acute pancreatitis    Subjective:     Interval History:  History taken from: patient chart  VSS overnight with no acute complaints. Cr increasing, and lipase decreased to 176. States is hungry but still complaining of abdominal pain greatest in RLQ.    Review of Systems:   Review of Systems   Constitutional: Negative for activity change and appetite change.   HENT: Negative for congestion and trouble swallowing.    Respiratory: Negative for chest tightness and shortness of breath.    Cardiovascular: Negative for chest pain and palpitations.   Gastrointestinal: Positive for abdominal pain. Negative for abdominal distention.   Genitourinary: Negative for difficulty urinating and dysuria.   Musculoskeletal: Negative for arthralgias and myalgias.   Skin: Negative for color change and pallor.   Neurological: Negative for dizziness, light-headedness and headaches.   Psychiatric/Behavioral: Negative for agitation and behavioral problems.       Objective:     Vital Signs  Temp:  [96.8 °F (36 °C)-98.8 °F (37.1 °C)] 97.5 °F (36.4 °C)  Heart Rate:  [74-86] 74  Resp:  [12-18] 18  BP: (103-156)/(50-72) 156/72    Physical Exam  Physical Exam  Constitutional:       General: She is not in acute distress.     Appearance: She is well-developed.   HENT:      Head: Normocephalic and atraumatic.      Right Ear: External ear normal.      Left Ear: External ear normal.      Nose: Nose normal.   Eyes:      Extraocular Movements: Extraocular movements intact.      Pupils: Pupils are equal, round, and reactive to light.   Neck:      Musculoskeletal: Normal range of motion and neck supple.   Cardiovascular:      Rate and Rhythm: Normal rate and regular rhythm.      Heart sounds: Normal heart sounds. No murmur. No friction rub. No gallop.    Pulmonary:      Effort:  Pulmonary effort is normal. No respiratory distress.      Breath sounds: Examination of the right-lower field reveals wheezing. Examination of the left-lower field reveals wheezing. Wheezing (Inspiratory) present. No rales.   Abdominal:      General: Bowel sounds are normal. There is no distension.      Palpations: Abdomen is soft.      Tenderness: There is abdominal tenderness (Greatest in RLQ) in the right lower quadrant, periumbilical area, suprapubic area and left lower quadrant.   Musculoskeletal: Normal range of motion.      Right lower leg: No edema.      Left lower leg: No edema.   Skin:     General: Skin is warm.      Findings: No erythema.   Neurological:      Mental Status: She is alert and oriented to person, place, and time. Mental status is at baseline.   Psychiatric:         Mood and Affect: Mood normal.         Behavior: Behavior normal.         Medication Review    Current Facility-Administered Medications:   •  albuterol sulfate HFA (PROVENTIL HFA;VENTOLIN HFA;PROAIR HFA) inhaler 2 puff, 2 puff, Inhalation, Q4H PRN, Eduin Griffin MD  •  aspirin EC tablet 81 mg, 81 mg, Oral, Daily, Eduin Griffin MD, 81 mg at 12/20/20 0814  •  atorvastatin (LIPITOR) tablet 40 mg, 40 mg, Oral, Daily, Eduin Griffin MD, 40 mg at 12/20/20 0813  •  cetirizine (zyrTEC) tablet 10 mg, 10 mg, Oral, Daily, Eduin Griffin MD, 10 mg at 12/20/20 0814  •  clopidogrel (PLAVIX) tablet 75 mg, 75 mg, Oral, Daily, Eduin Griffin MD, 75 mg at 12/20/20 0814  •  cyclobenzaprine (FLEXERIL) tablet 10 mg, 10 mg, Oral, BID PRN, Eduin Griffin MD  •  dextrose (D50W) 25 g/ 50mL Intravenous Solution 25 g, 25 g, Intravenous, Q15 Min PRN, Eduin Griffin MD  •  dextrose (GLUTOSE) oral gel 15 g, 15 g, Oral, Q15 Min PRN, Eduin Griffin MD  •  dextrose 5 % and sodium chloride 0.45 % infusion, 150 mL/hr, Intravenous, Continuous, Nirmal Calvillo MD, Last Rate: 150 mL/hr at 12/20/20 0815, 150 mL/hr at 12/20/20 0815  •  enoxaparin (LOVENOX) syringe 40 mg, 40 mg,  Subcutaneous, Q24H, Eduin Griffin MD, 40 mg at 12/19/20 1624  •  gabapentin (NEURONTIN) capsule 400 mg, 400 mg, Oral, Q12H, Eduin Griffin MD, 400 mg at 12/20/20 0814  •  glucagon (human recombinant) (GLUCAGEN DIAGNOSTIC) injection 1 mg, 1 mg, Subcutaneous, Q15 Min PRN, Eduin Griffin MD, 1 mg at 12/19/20 0622  •  hydrALAZINE (APRESOLINE) injection 10 mg, 10 mg, Intravenous, Q6H PRN, Eduin Griffin MD  •  insulin aspart (novoLOG) injection 0-24 Units, 0-24 Units, Subcutaneous, TID AC, Eduin Griffin MD, 4 Units at 12/20/20 0814  •  insulin detemir (LEVEMIR) injection 30 Units, 30 Units, Subcutaneous, Nightly, Eduin Griffin MD  •  isosorbide mononitrate (IMDUR) 24 hr tablet 30 mg, 30 mg, Oral, Q24H, Eduin Griffin MD, 30 mg at 12/20/20 0814  •  metoprolol tartrate (LOPRESSOR) tablet 100 mg, 100 mg, Oral, Q12H, Eduin Griffin MD, 100 mg at 12/20/20 0814  •  montelukast (SINGULAIR) tablet 10 mg, 10 mg, Oral, Nightly, Eduin Griffin MD, 10 mg at 12/19/20 2200  •  morphine injection 4 mg, 4 mg, Intravenous, Q4H PRN, 4 mg at 12/19/20 1502 **AND** naloxone (NARCAN) injection 0.4 mg, 0.4 mg, Intravenous, Q5 Min PRN, Eduin Grfifin MD  •  nicotine (NICODERM CQ) 14 MG/24HR patch 1 patch, 1 patch, Transdermal, Q24H, Eduin Griffin MD, 1 patch at 12/19/20 1621  •  ondansetron (ZOFRAN) injection 4 mg, 4 mg, Intravenous, Q6H PRN, Eduin Griffin MD, 4 mg at 12/19/20 2155  •  pantoprazole (PROTONIX) injection 40 mg, 40 mg, Intravenous, Q AM, Eduin Griffin MD, 40 mg at 12/20/20 0509  •  sodium bicarbonate tablet 325 mg, 325 mg, Oral, BID, Eduin Griffin MD, 325 mg at 12/20/20 0813  •  sodium chloride 0.9 % flush 10 mL, 10 mL, Intravenous, PRN, Bebeto Coyle PA-C  •  sodium chloride 0.9 % flush 10 mL, 10 mL, Intravenous, Q12H, Eduin Griffin MD, 10 mL at 12/20/20 0814  •  sodium chloride 0.9 % flush 10 mL, 10 mL, Intravenous, PRN, Eduin Griffin MD     Diagnostic Data    Lab Results (last 24 hours)     Procedure Component Value Units Date/Time    POC Glucose Once  [[id number]]  (Abnormal) Collected: 12/20/20 0614    Specimen: Blood Updated: 12/20/20 0628     Glucose 170 mg/dL      Comment: RN NotifiedOperator: [id number] ADAMA Marquez ID: [id number]       Comprehensive Metabolic Panel [[id number]]  (Abnormal) Collected: 12/20/20 0534    Specimen: Blood Updated: 12/20/20 0616     Glucose 174 mg/dL      BUN 33 mg/dL      Creatinine 1.93 mg/dL      Sodium 139 mmol/L      Potassium 4.8 mmol/L      Comment: Slight hemolysis detected by analyzer. Results may be affected.        Chloride 112 mmol/L      CO2 22.0 mmol/L      Calcium 8.7 mg/dL      Total Protein 6.8 g/dL      Albumin 3.00 g/dL      ALT (SGPT) 21 U/L      AST (SGOT) 51 U/L      Alkaline Phosphatase 125 U/L      Total Bilirubin 0.2 mg/dL      eGFR Non African Amer 26 mL/min/1.73      Globulin 3.8 gm/dL      A/G Ratio 0.8 g/dL      BUN/Creatinine Ratio 17.1     Anion Gap 5.0 mmol/L     Narrative:      GFR Normal >60  Chronic Kidney Disease <60  Kidney Failure <15      Lipase [[id number]]  (Abnormal) Collected: 12/20/20 0534    Specimen: Blood Updated: 12/20/20 0616     Lipase 176 U/L     CBC Auto Differential [[id number]]  (Abnormal) Collected: 12/20/20 0534    Specimen: Blood Updated: 12/20/20 0558     WBC 7.73 10*3/mm3      RBC 3.93 10*6/mm3      Hemoglobin 10.8 g/dL      Hematocrit 35.6 %      MCV 90.6 fL      MCH 27.5 pg      MCHC 30.3 g/dL      RDW 15.2 %      RDW-SD 50.8 fl      MPV 9.3 fL      Platelets 251 10*3/mm3      Neutrophil % 53.2 %      Lymphocyte % 32.6 %      Monocyte % 7.4 %      Eosinophil % 5.4 %      Basophil % 0.8 %      Immature Grans % 0.6 %      Neutrophils, Absolute 4.11 10*3/mm3      Lymphocytes, Absolute 2.52 10*3/mm3      Monocytes, Absolute 0.57 10*3/mm3      Eosinophils, Absolute 0.42 10*3/mm3      Basophils, Absolute 0.06 10*3/mm3      Immature Grans, Absolute 0.05 10*3/mm3      nRBC 0.0 /100 WBC     POC Glucose Once [[id number]]  (Normal) Collected: 12/19/20 1930    Specimen: Blood  Updated: 12/1960     Glucose 126 mg/dL      Comment: Result Not ConfirmedOperator: [id number] LUCILA SYKES ANNMeter ID: [id number]       POC Glucose Once [[id number]]  (Normal) Collected: 12/19/20 1632    Specimen: Blood Updated: 12/19/20 1645     Glucose 74 mg/dL      Comment: : [id number] LUCILA SYKES ANNMeter ID: [id number]       Urine Culture - Urine, Urine, Clean Catch [[id number]] Collected: 12/18/20 1134    Specimen: Urine, Clean Catch Updated: 12/19/20 1133     Urine Culture 25,000 CFU/mL Mixed Nataly Isolated    Narrative:      Specimen contains mixed organisms of questionable pathogenicity which indicates contamination with commensal nataly.  Further identification is unlikely to provide clinically useful information.  Suggest recollection.    POC Glucose Once [[id number]]  (Normal) Collected: 12/19/20 1030    Specimen: Blood Updated: 12/19/20 1100     Glucose 109 mg/dL      Comment: : [id number] TRISTEN Berger ID: [id number]              Imaging Results (Last 24 Hours)     ** No results found for the last 24 hours. **          I reviewed the patient's new clinical results.    Assessment/Plan:     Active Hospital Problems    Diagnosis   • **Acute pancreatitis     Lipase 2613,  CT negative for rogerio, appy,  LFT's wnL,  Ca wnL on admission, will monitor.  Likely pancreatitis.  Patient is not a etoh abuser, but has hypertriglyceridemia at 388.  Also has a DPP-4 inhibitor.  Likely combination of these two factors.  Acute, no chronic changes found on CT abdomen.  - NPO  - D5-1/2NS @ 150cc/hr  - CMP/CBC daily  - Morphine 4mg IV PRN pain     • Stage 3b chronic kidney disease     -Continue home medications  -We will continue to monitor. Baseline Cr ~ 1.49 GFR 36 back in September 2020  - Hold nephrotoxic agents, lisinopril  -Daily CMPs     • Chronic obstructive pulmonary disease (CMS/HCC)     Stable on room air.  - Hold home PO medications  -We'll provide duo nebs 4 times a day while awake  and albuterol as needed for wheezing and shortness of air  -We'll provide supplemental oxygen as needed in order to maintain saturation greater than 90%  -Encourage smoking cessation     • Type 2 diabetes mellitus with diabetic polyneuropathy, with long-term current use of insulin (CMS/[facility])     Last A1C 9.5 12/4/20.  Poorly controlled.  Patient has gross sugars and proteins.  On glargine 100units QHS QAM  - Levemir 30 units QHS  - 7 Units with meals with high dose SS  - Holding all home diabetes medications     • GERD (gastroesophageal reflux disease)     - IV protonix qday     • Tobacco dependence syndrome     Smokes 1-5 cigarettes per day now.  Used to be 1 pack for 46 years.  - Nicotine patch     • Essential hypertension     - Hold home PO medications  - Hydralizine 10mg IV PRN sbps >180           DVT prophylaxis: Lovenox  Code Status and Medical Interventions:   Ordered at: 12/18/20 1439     Level Of Support Discussed With:    Patient     Code Status:    CPR     Medical Interventions (Level of Support Prior to Arrest):    Full       Plan for disposition:Where: current living arrangements and When:  1-2  days      Time: 15 min           This document has been electronically signed by Nirmal Calvillo MD on December 20, 2020 08:23 CST

## 2020-12-21 LAB
ALBUMIN SERPL-MCNC: 3.3 G/DL (ref 3.5–5.2)
ALBUMIN/GLOB SERPL: 0.8 G/DL
ALP SERPL-CCNC: 139 U/L (ref 39–117)
ALT SERPL W P-5'-P-CCNC: 25 U/L (ref 1–33)
ANION GAP SERPL CALCULATED.3IONS-SCNC: 9 MMOL/L (ref 5–15)
AST SERPL-CCNC: 57 U/L (ref 1–32)
BASOPHILS # BLD AUTO: 0.08 10*3/MM3 (ref 0–0.2)
BASOPHILS NFR BLD AUTO: 0.9 % (ref 0–1.5)
BILIRUB SERPL-MCNC: 0.2 MG/DL (ref 0–1.2)
BUN SERPL-MCNC: 24 MG/DL (ref 8–23)
BUN/CREAT SERPL: 13.3 (ref 7–25)
CALCIUM SPEC-SCNC: 8.8 MG/DL (ref 8.6–10.5)
CHLORIDE SERPL-SCNC: 107 MMOL/L (ref 98–107)
CO2 SERPL-SCNC: 22 MMOL/L (ref 22–29)
CREAT SERPL-MCNC: 1.81 MG/DL (ref 0.57–1)
DEPRECATED RDW RBC AUTO: 49.3 FL (ref 37–54)
EOSINOPHIL # BLD AUTO: 0.49 10*3/MM3 (ref 0–0.4)
EOSINOPHIL NFR BLD AUTO: 5.4 % (ref 0.3–6.2)
ERYTHROCYTE [DISTWIDTH] IN BLOOD BY AUTOMATED COUNT: 14.9 % (ref 12.3–15.4)
GFR SERPL CREATININE-BSD FRML MDRD: 28 ML/MIN/1.73
GLOBULIN UR ELPH-MCNC: 4 GM/DL
GLUCOSE BLDC GLUCOMTR-MCNC: 123 MG/DL (ref 70–130)
GLUCOSE BLDC GLUCOMTR-MCNC: 206 MG/DL (ref 70–130)
GLUCOSE BLDC GLUCOMTR-MCNC: 248 MG/DL (ref 70–130)
GLUCOSE BLDC GLUCOMTR-MCNC: 331 MG/DL (ref 70–130)
GLUCOSE SERPL-MCNC: 221 MG/DL (ref 65–99)
HCT VFR BLD AUTO: 37.2 % (ref 34–46.6)
HGB BLD-MCNC: 11.4 G/DL (ref 12–15.9)
IMM GRANULOCYTES # BLD AUTO: 0.05 10*3/MM3 (ref 0–0.05)
IMM GRANULOCYTES NFR BLD AUTO: 0.6 % (ref 0–0.5)
LYMPHOCYTES # BLD AUTO: 2.62 10*3/MM3 (ref 0.7–3.1)
LYMPHOCYTES NFR BLD AUTO: 29.1 % (ref 19.6–45.3)
MCH RBC QN AUTO: 27.8 PG (ref 26.6–33)
MCHC RBC AUTO-ENTMCNC: 30.6 G/DL (ref 31.5–35.7)
MCV RBC AUTO: 90.7 FL (ref 79–97)
MONOCYTES # BLD AUTO: 0.61 10*3/MM3 (ref 0.1–0.9)
MONOCYTES NFR BLD AUTO: 6.8 % (ref 5–12)
NEUTROPHILS NFR BLD AUTO: 5.16 10*3/MM3 (ref 1.7–7)
NEUTROPHILS NFR BLD AUTO: 57.2 % (ref 42.7–76)
NRBC BLD AUTO-RTO: 0 /100 WBC (ref 0–0.2)
PLATELET # BLD AUTO: 260 10*3/MM3 (ref 140–450)
PMV BLD AUTO: 9.3 FL (ref 6–12)
POTASSIUM SERPL-SCNC: 4.6 MMOL/L (ref 3.5–5.2)
PROT SERPL-MCNC: 7.3 G/DL (ref 6–8.5)
RBC # BLD AUTO: 4.1 10*6/MM3 (ref 3.77–5.28)
SODIUM SERPL-SCNC: 138 MMOL/L (ref 136–145)
WBC # BLD AUTO: 9.01 10*3/MM3 (ref 3.4–10.8)

## 2020-12-21 PROCEDURE — 94799 UNLISTED PULMONARY SVC/PX: CPT

## 2020-12-21 PROCEDURE — 80053 COMPREHEN METABOLIC PANEL: CPT | Performed by: STUDENT IN AN ORGANIZED HEALTH CARE EDUCATION/TRAINING PROGRAM

## 2020-12-21 PROCEDURE — 85025 COMPLETE CBC W/AUTO DIFF WBC: CPT | Performed by: STUDENT IN AN ORGANIZED HEALTH CARE EDUCATION/TRAINING PROGRAM

## 2020-12-21 PROCEDURE — 63710000001 INSULIN ASPART PER 5 UNITS: Performed by: STUDENT IN AN ORGANIZED HEALTH CARE EDUCATION/TRAINING PROGRAM

## 2020-12-21 PROCEDURE — 82962 GLUCOSE BLOOD TEST: CPT

## 2020-12-21 PROCEDURE — 25010000002 MORPHINE PER 10 MG: Performed by: STUDENT IN AN ORGANIZED HEALTH CARE EDUCATION/TRAINING PROGRAM

## 2020-12-21 PROCEDURE — 25010000002 ENOXAPARIN PER 10 MG: Performed by: STUDENT IN AN ORGANIZED HEALTH CARE EDUCATION/TRAINING PROGRAM

## 2020-12-21 PROCEDURE — 99232 SBSQ HOSP IP/OBS MODERATE 35: CPT | Performed by: STUDENT IN AN ORGANIZED HEALTH CARE EDUCATION/TRAINING PROGRAM

## 2020-12-21 PROCEDURE — 94660 CPAP INITIATION&MGMT: CPT

## 2020-12-21 PROCEDURE — 63710000001 INSULIN DETEMIR PER 5 UNITS: Performed by: STUDENT IN AN ORGANIZED HEALTH CARE EDUCATION/TRAINING PROGRAM

## 2020-12-21 RX ORDER — SODIUM CHLORIDE 9 MG/ML
150 INJECTION, SOLUTION INTRAVENOUS CONTINUOUS
Status: DISCONTINUED | OUTPATIENT
Start: 2020-12-21 | End: 2020-12-22 | Stop reason: HOSPADM

## 2020-12-21 RX ORDER — NYSTATIN 100000 [USP'U]/G
POWDER TOPICAL DAILY PRN
Status: DISCONTINUED | OUTPATIENT
Start: 2020-12-21 | End: 2020-12-22 | Stop reason: HOSPADM

## 2020-12-21 RX ADMIN — GABAPENTIN 400 MG: 400 CAPSULE ORAL at 08:38

## 2020-12-21 RX ADMIN — MORPHINE SULFATE 4 MG: 4 INJECTION, SOLUTION INTRAMUSCULAR; INTRAVENOUS at 18:17

## 2020-12-21 RX ADMIN — ENOXAPARIN SODIUM 40 MG: 40 INJECTION SUBCUTANEOUS at 17:19

## 2020-12-21 RX ADMIN — CLOPIDOGREL BISULFATE 75 MG: 75 TABLET ORAL at 08:39

## 2020-12-21 RX ADMIN — METOPROLOL TARTRATE 100 MG: 50 TABLET, FILM COATED ORAL at 20:37

## 2020-12-21 RX ADMIN — SODIUM CHLORIDE, PRESERVATIVE FREE 10 ML: 5 INJECTION INTRAVENOUS at 08:39

## 2020-12-21 RX ADMIN — NYSTATIN: 100000 CREAM TOPICAL at 17:00

## 2020-12-21 RX ADMIN — NICOTINE 1 PATCH: 14 PATCH, EXTENDED RELEASE TRANSDERMAL at 17:19

## 2020-12-21 RX ADMIN — SODIUM CHLORIDE 150 ML/HR: 9 INJECTION, SOLUTION INTRAVENOUS at 10:28

## 2020-12-21 RX ADMIN — SODIUM CHLORIDE 150 ML/HR: 9 INJECTION, SOLUTION INTRAVENOUS at 17:42

## 2020-12-21 RX ADMIN — ATORVASTATIN CALCIUM 40 MG: 40 TABLET, FILM COATED ORAL at 08:38

## 2020-12-21 RX ADMIN — PANTOPRAZOLE SODIUM 40 MG: 40 INJECTION, POWDER, FOR SOLUTION INTRAVENOUS at 05:44

## 2020-12-21 RX ADMIN — MORPHINE SULFATE 4 MG: 4 INJECTION, SOLUTION INTRAMUSCULAR; INTRAVENOUS at 09:44

## 2020-12-21 RX ADMIN — MONTELUKAST SODIUM 10 MG: 10 TABLET, COATED ORAL at 20:37

## 2020-12-21 RX ADMIN — SODIUM BICARBONATE TAB 325 MG 325 MG: 325 TAB at 20:37

## 2020-12-21 RX ADMIN — SODIUM BICARBONATE TAB 325 MG 325 MG: 325 TAB at 08:39

## 2020-12-21 RX ADMIN — DOCUSATE SODIUM 50 MG AND SENNOSIDES 8.6 MG 1 TABLET: 8.6; 5 TABLET, FILM COATED ORAL at 20:37

## 2020-12-21 RX ADMIN — INSULIN ASPART 8 UNITS: 100 INJECTION, SOLUTION INTRAVENOUS; SUBCUTANEOUS at 08:41

## 2020-12-21 RX ADMIN — METOPROLOL TARTRATE 100 MG: 50 TABLET, FILM COATED ORAL at 08:38

## 2020-12-21 RX ADMIN — MORPHINE SULFATE 4 MG: 4 INJECTION, SOLUTION INTRAMUSCULAR; INTRAVENOUS at 14:26

## 2020-12-21 RX ADMIN — DEXTROSE AND SODIUM CHLORIDE 150 ML/HR: 5; 450 INJECTION, SOLUTION INTRAVENOUS at 05:47

## 2020-12-21 RX ADMIN — ASPIRIN 81 MG: 81 TABLET, COATED ORAL at 08:40

## 2020-12-21 RX ADMIN — INSULIN DETEMIR 30 UNITS: 100 INJECTION, SOLUTION SUBCUTANEOUS at 20:38

## 2020-12-21 RX ADMIN — INSULIN ASPART 16 UNITS: 100 INJECTION, SOLUTION INTRAVENOUS; SUBCUTANEOUS at 11:48

## 2020-12-21 RX ADMIN — MORPHINE SULFATE 4 MG: 4 INJECTION, SOLUTION INTRAMUSCULAR; INTRAVENOUS at 22:10

## 2020-12-21 RX ADMIN — CETIRIZINE HYDROCHLORIDE 10 MG: 10 TABLET, FILM COATED ORAL at 08:38

## 2020-12-21 RX ADMIN — ISOSORBIDE MONONITRATE 30 MG: 30 TABLET, EXTENDED RELEASE ORAL at 08:39

## 2020-12-21 RX ADMIN — GABAPENTIN 400 MG: 400 CAPSULE ORAL at 20:37

## 2020-12-21 RX ADMIN — MORPHINE SULFATE 4 MG: 4 INJECTION, SOLUTION INTRAMUSCULAR; INTRAVENOUS at 05:47

## 2020-12-21 NOTE — PLAN OF CARE
Problem: Adult Inpatient Plan of Care  Goal: Plan of Care Review  Outcome: Ongoing, Progressing  Goal: Patient-Specific Goal (Individualized)  Outcome: Ongoing, Progressing  Goal: Absence of Hospital-Acquired Illness or Injury  Outcome: Ongoing, Progressing  Goal: Optimal Comfort and Wellbeing  Outcome: Ongoing, Progressing  Goal: Readiness for Transition of Care  Outcome: Ongoing, Progressing     Problem: Fall Injury Risk  Goal: Absence of Fall and Fall-Related Injury  Outcome: Ongoing, Progressing     Problem: Skin Injury Risk Increased  Goal: Skin Health and Integrity  Outcome: Ongoing, Progressing   Goal Outcome Evaluation:  Plan of Care Reviewed With: patient  Progress: improving

## 2020-12-21 NOTE — PROGRESS NOTES
Discharge Planning Assessment  [facility]     Patient Name: Yamileth Slater  MRN: [id number]  Today's Date: 12/21/2020    Admit Date: 12/18/2020    Discharge Needs Assessment     Row Name 12/21/20 1116       Living Environment    Lives With  spouse    Current Living Arrangements  home/apartment/condo    Primary Care Provided by  self    Provides Primary Care For  no one    Family Caregiver if Needed  child(lynette), adult;spouse    Quality of Family Relationships  helpful;involved;supportive    Able to Return to Prior Arrangements  yes       Transition Planning    Patient/Family Anticipates Transition to  home with help/services    Patient/Family Anticipated Services at Transition  home health care    Transportation Anticipated  family or friend will provide       Discharge Needs Assessment    Current Outpatient/Agency/Support Group  homecare agency    Equipment Currently Used at Home  shower chair;cane, quad;walker, rolling    Equipment Needed After Discharge  none        Discharge Plan     Row Name 12/21/20 1112       Plan    Plan  Home with [facility]    Patient/Family in Agreement with Plan  yes    Plan Comments  LACE complete. Spoke with patient by phone. Verified demographics, pcp, insurance. She will be using meds to bed. Lives at home with spouse. Plans to return on discharge. She would like  services on discharge and has used [facility] in the past and would like to continue with them. Denies any DME needs. KENTRELL Rich, CCM        Continued Care and Services - Admitted Since 12/18/2020    Coordination has not been started for this encounter.     Selected Continued Care - Prior Encounters Includes selections from prior encounters from 9/19/2020 to 12/21/2020    Discharged on 10/6/2020 Admission date: 10/5/2020 - Discharge disposition: Home-Health Care [facility]    Home Medical Care     Service Provider Selected Services Address Phone Fax Patient Preferred    [facility]  [address] , [facility] [address] [phone] --                      Demographic Summary     Row Name 12/21/20 1114       General Information    Admission Type  inpatient    Arrived From  emergency department    Required Notices Provided  Important Message from Medicare    Referral Source  high risk screening        Functional Status     Row Name 12/21/20 1115       Functional Status    Usual Activity Tolerance  moderate    Current Activity Tolerance  moderate       Functional Status, IADL    Medications  independent    Meal Preparation  independent    Housekeeping  assistive person    Laundry  assistive person    Shopping  assistive person       Employment/    Employment Status  disabled        Psychosocial    No documentation.       Abuse/Neglect    No documentation.       Legal    No documentation.       Substance Abuse    No documentation.       Patient Forms    No documentation.           Roma Chapman RN

## 2020-12-21 NOTE — PROGRESS NOTES
FAMILY MEDICINE DAILY PROGRESS NOTE  NAME: Yamileth Slater  : 1960  MRN: [id number]     LOS: 3 days     PROVIDER OF SERVICE: Nirmal Calvillo MD    Chief Complaint: Acute pancreatitis    Subjective:     Interval History:  History taken from: patient chart  VSS with one isolated reading of HTN. Patient states did not sleep well but did not use CPAP. Was able to tolerate clear liquid diet and abdominal pain is the same as yesterday. LFTs slightly elevated from yesterday.    Review of Systems:   Review of Systems   Constitutional: Negative for activity change and appetite change.   HENT: Negative for congestion and trouble swallowing.    Respiratory: Negative for chest tightness and shortness of breath.    Cardiovascular: Negative for chest pain and palpitations.   Gastrointestinal: Positive for abdominal pain. Negative for abdominal distention.   Genitourinary: Negative for difficulty urinating and dysuria.   Musculoskeletal: Negative for arthralgias and myalgias.   Skin: Negative for color change and pallor.   Neurological: Negative for dizziness, light-headedness and headaches.   Psychiatric/Behavioral: Negative for agitation and behavioral problems.       Objective:     Vital Signs  Temp:  [96.6 °F (35.9 °C)-97.8 °F (36.6 °C)] 96.7 °F (35.9 °C)  Heart Rate:  [66-99] 68  Resp:  [18] 18  BP: (115-193)/(58-79) 140/65    Physical Exam  Physical Exam  Constitutional:       General: She is not in acute distress.     Appearance: She is well-developed.   HENT:      Head: Normocephalic and atraumatic.      Right Ear: External ear normal.      Left Ear: External ear normal.      Nose: Nose normal.   Eyes:      Extraocular Movements: Extraocular movements intact.      Pupils: Pupils are equal, round, and reactive to light.   Neck:      Musculoskeletal: Normal range of motion and neck supple.   Cardiovascular:      Rate and Rhythm: Normal rate and regular rhythm.      Heart sounds: Normal heart sounds. No  murmur. No friction rub. No gallop.    Pulmonary:      Effort: Pulmonary effort is normal. No respiratory distress.      Breath sounds: Normal breath sounds. No wheezing or rales.   Abdominal:      General: Bowel sounds are normal. There is no distension.      Palpations: Abdomen is soft.      Tenderness: There is abdominal tenderness in the right upper quadrant and right lower quadrant.   Musculoskeletal: Normal range of motion.      Right lower leg: No edema.      Left lower leg: No edema.   Skin:     General: Skin is warm.      Findings: No erythema.   Neurological:      Mental Status: She is alert and oriented to person, place, and time. Mental status is at baseline.   Psychiatric:         Mood and Affect: Mood normal.         Behavior: Behavior normal.         Medication Review    Current Facility-Administered Medications:   •  albuterol sulfate HFA (PROVENTIL HFA;VENTOLIN HFA;PROAIR HFA) inhaler 2 puff, 2 puff, Inhalation, Q4H PRN, Eduin Griffin MD  •  aspirin EC tablet 81 mg, 81 mg, Oral, Daily, Eduin Griffin MD, 81 mg at 12/21/20 0840  •  atorvastatin (LIPITOR) tablet 40 mg, 40 mg, Oral, Daily, Eduin Griffin MD, 40 mg at 12/21/20 0838  •  cetirizine (zyrTEC) tablet 10 mg, 10 mg, Oral, Daily, Eduin Griffin MD, 10 mg at 12/21/20 0838  •  clopidogrel (PLAVIX) tablet 75 mg, 75 mg, Oral, Daily, Eduin Griffin MD, 75 mg at 12/21/20 0839  •  cyclobenzaprine (FLEXERIL) tablet 10 mg, 10 mg, Oral, BID PRN, Eduin Griffin MD, 10 mg at 12/20/20 1729  •  dextrose (D50W) 25 g/ 50mL Intravenous Solution 25 g, 25 g, Intravenous, Q15 Min PRN, Eduin Griffin MD  •  dextrose (GLUTOSE) oral gel 15 g, 15 g, Oral, Q15 Min PRN, Eduin Griffin MD  •  enoxaparin (LOVENOX) syringe 40 mg, 40 mg, Subcutaneous, Q24H, Eduin Griffin MD, 40 mg at 12/20/20 1611  •  gabapentin (NEURONTIN) capsule 400 mg, 400 mg, Oral, Q12H, Eduin Griffin MD, 400 mg at 12/21/20 0838  •  glucagon (human recombinant) (GLUCAGEN DIAGNOSTIC) injection 1 mg, 1 mg, Subcutaneous, Q15 Min  PRN, Eduin Griffin MD, 1 mg at 12/19/20 0622  •  hydrALAZINE (APRESOLINE) injection 10 mg, 10 mg, Intravenous, Q6H PRN, Eduin Griffin MD  •  insulin aspart (novoLOG) injection 0-24 Units, 0-24 Units, Subcutaneous, TID AC, Eduin Griffin MD, 8 Units at 12/21/20 0841  •  insulin detemir (LEVEMIR) injection 30 Units, 30 Units, Subcutaneous, Nightly, Eduin Griffin MD, 30 Units at 12/20/20 2134  •  isosorbide mononitrate (IMDUR) 24 hr tablet 30 mg, 30 mg, Oral, Q24H, Eduin Griffin MD, 30 mg at 12/21/20 0839  •  metoprolol tartrate (LOPRESSOR) tablet 100 mg, 100 mg, Oral, Q12H, Eduin Griffin MD, 100 mg at 12/21/20 0838  •  montelukast (SINGULAIR) tablet 10 mg, 10 mg, Oral, Nightly, Eduin Griffin MD, 10 mg at 12/20/20 2132  •  morphine injection 4 mg, 4 mg, Intravenous, Q4H PRN, 4 mg at 12/21/20 0547 **AND** naloxone (NARCAN) injection 0.4 mg, 0.4 mg, Intravenous, Q5 Min PRN, Eduin Griffin MD  •  nicotine (NICODERM CQ) 14 MG/24HR patch 1 patch, 1 patch, Transdermal, Q24H, Eduin Griffin MD, 1 patch at 12/20/20 1612  •  ondansetron (ZOFRAN) injection 4 mg, 4 mg, Intravenous, Q6H PRN, Eduin Griffin MD, 4 mg at 12/20/20 1204  •  pantoprazole (PROTONIX) injection 40 mg, 40 mg, Intravenous, Q AM, Eduin Griffin MD, 40 mg at 12/21/20 0544  •  sennosides-docusate (PERICOLACE) 8.6-50 MG per tablet 1 tablet, 1 tablet, Oral, Nightly, Haily Mcneil MD, 1 tablet at 12/20/20 2132  •  sodium bicarbonate tablet 325 mg, 325 mg, Oral, BID, Eduin Griffin MD, 325 mg at 12/21/20 0839  •  sodium chloride 0.9 % flush 10 mL, 10 mL, Intravenous, PRN, Bebeto Coyle PA-C  •  sodium chloride 0.9 % flush 10 mL, 10 mL, Intravenous, Q12H, Eduin Griffin MD, 10 mL at 12/21/20 0839  •  sodium chloride 0.9 % flush 10 mL, 10 mL, Intravenous, PRN, Eduin Griffin MD  •  sodium chloride 0.9 % infusion, 150 mL/hr, Intravenous, Continuous, Mone Us MD     Diagnostic Data    Lab Results (last 24 hours)     Procedure Component Value Units Date/Time    Comprehensive Metabolic  Panel [[id number]]  (Abnormal) Collected: 12/21/20 0546    Specimen: Blood Updated: 12/21/20 0649     Glucose 221 mg/dL      BUN 24 mg/dL      Creatinine 1.81 mg/dL      Sodium 138 mmol/L      Potassium 4.6 mmol/L      Chloride 107 mmol/L      CO2 22.0 mmol/L      Calcium 8.8 mg/dL      Total Protein 7.3 g/dL      Albumin 3.30 g/dL      ALT (SGPT) 25 U/L      AST (SGOT) 57 U/L      Alkaline Phosphatase 139 U/L      Total Bilirubin 0.2 mg/dL      eGFR Non African Amer 28 mL/min/1.73      Globulin 4.0 gm/dL      A/G Ratio 0.8 g/dL      BUN/Creatinine Ratio 13.3     Anion Gap 9.0 mmol/L     Narrative:      GFR Normal >60  Chronic Kidney Disease <60  Kidney Failure <15      POC Glucose Once [[id number]]  (Abnormal) Collected: 12/21/20 0616    Specimen: Blood Updated: 12/21/20 0631     Glucose 248 mg/dL      Comment: RN NotifiedOperator: [id number] IRLANDA Hoyos ID: [id number]       CBC Auto Differential [[id number]]  (Abnormal) Collected: 12/21/20 0546    Specimen: Blood Updated: 12/21/20 0625     WBC 9.01 10*3/mm3      RBC 4.10 10*6/mm3      Hemoglobin 11.4 g/dL      Hematocrit 37.2 %      MCV 90.7 fL      MCH 27.8 pg      MCHC 30.6 g/dL      RDW 14.9 %      RDW-SD 49.3 fl      MPV 9.3 fL      Platelets 260 10*3/mm3      Neutrophil % 57.2 %      Lymphocyte % 29.1 %      Monocyte % 6.8 %      Eosinophil % 5.4 %      Basophil % 0.9 %      Immature Grans % 0.6 %      Neutrophils, Absolute 5.16 10*3/mm3      Lymphocytes, Absolute 2.62 10*3/mm3      Monocytes, Absolute 0.61 10*3/mm3      Eosinophils, Absolute 0.49 10*3/mm3      Basophils, Absolute 0.08 10*3/mm3      Immature Grans, Absolute 0.05 10*3/mm3      nRBC 0.0 /100 WBC     POC Glucose Once [[id number]]  (Abnormal) Collected: 12/20/20 1912    Specimen: Blood Updated: 12/20/20 1928     Glucose 270 mg/dL      Comment: RN NotifiedOperator: [id number] MATEO MIKAYLAMeter ID: [id number]       POC Glucose Once [[id number]]  (Abnormal) Collected: 12/20/20 1622     Specimen: Blood Updated: 12/20/20 1639     Glucose 220 mg/dL      Comment: RN NotifiedOperator: [id number] MINERVA CHENMeter ID: [id number]       POC Glucose Once [[id number]]  (Abnormal) Collected: 12/20/20 1016    Specimen: Blood Updated: 12/20/20 1044     Glucose 200 mg/dL      Comment: RN NotifiedOperator: [id number] TRISTEN Berger ID: [id number]              Imaging Results (Last 24 Hours)     ** No results found for the last 24 hours. **          I reviewed the patient's new clinical results.    Assessment/Plan:     Active Hospital Problems    Diagnosis   • **Acute pancreatitis     Lipase 2613,  CT negative for rogerio, appy,  LFT's wnL,  Ca wnL on admission, will monitor.  Likely pancreatitis.  Patient is not a etoh abuser, but has hypertriglyceridemia at 388.  Also has a DPP-4 inhibitor.  Likely combination of these two factors.  Acute, no chronic changes found on CT abdomen.  - NPO  - D5-1/2NS @ 150cc/hr  - CMP/CBC daily  - Morphine 4mg IV PRN pain     • Stage 3b chronic kidney disease     -Continue home medications  -We will continue to monitor. Baseline Cr ~ 1.49 GFR 36 back in September 2020  - Hold nephrotoxic agents, lisinopril  -Daily CMPs     • Chronic obstructive pulmonary disease (CMS/HCC)     Stable on room air.  - Hold home PO medications  -We'll provide duo nebs 4 times a day while awake and albuterol as needed for wheezing and shortness of air  -We'll provide supplemental oxygen as needed in order to maintain saturation greater than 90%  -Encourage smoking cessation     • Type 2 diabetes mellitus with diabetic polyneuropathy, with long-term current use of insulin (CMS/HCC)     Last A1C 9.5 12/4/20.  Poorly controlled.  Patient has gross sugars and proteins.  On glargine 100units QHS QAM  - Levemir 30 units QHS  - 7 Units with meals with high dose SS  - Holding all home diabetes medications     • GERD (gastroesophageal reflux disease)     - IV protonix qday     • Tobacco dependence  syndrome     Smokes 1-5 cigarettes per day now.  Used to be 1 pack for 46 years.  - Nicotine patch     • Essential hypertension     - Hold home PO medications  - Hydralizine 10mg IV PRN sbps >180           DVT prophylaxis: Lovenox  Code Status and Medical Interventions:   Ordered at: 12/18/20 1439     Level Of Support Discussed With:    Patient     Code Status:    CPR     Medical Interventions (Level of Support Prior to Arrest):    Full       Plan for disposition:Where: current living arrangements and When:  1-2 days      Time: 15 min           This document has been electronically signed by Nirmal Calvillo MD on December 21, 2020 08:58 CST

## 2020-12-21 NOTE — NURSING NOTE
Daughter (Suzanne Rivera ) called concerned with patients care stating pump had been beeping for long period of time and had taken long period of time for pain medication to be given, she requested that a new nurse (Monica Schaefer ) be assigned to the patient, this was done at this time.

## 2020-12-21 NOTE — NURSING NOTE
Patients daughter (Suzanne Rivera ) called and confirmed that new nurse had been assigned and pain medications had been administered to patient at appropriate times throughout the morning.  Daughter( Suzanne Rivera ) stated that her concern was that it had taken her asking 3 times to receive the mediation. I assured her that we would strive to correct this issue.

## 2020-12-22 VITALS
BODY MASS INDEX: 53.62 KG/M2 | DIASTOLIC BLOOD PRESSURE: 79 MMHG | TEMPERATURE: 97.6 F | WEIGHT: 291.4 LBS | HEART RATE: 66 BPM | RESPIRATION RATE: 16 BRPM | SYSTOLIC BLOOD PRESSURE: 170 MMHG | HEIGHT: 62 IN | OXYGEN SATURATION: 95 %

## 2020-12-22 PROBLEM — K85.90 ACUTE PANCREATITIS: Status: RESOLVED | Noted: 2020-12-18 | Resolved: 2020-12-22

## 2020-12-22 LAB
ALBUMIN SERPL-MCNC: 3.2 G/DL (ref 3.5–5.2)
ALBUMIN/GLOB SERPL: 0.8 G/DL
ALP SERPL-CCNC: 146 U/L (ref 39–117)
ALT SERPL W P-5'-P-CCNC: 24 U/L (ref 1–33)
ANION GAP SERPL CALCULATED.3IONS-SCNC: 6 MMOL/L (ref 5–15)
AST SERPL-CCNC: 39 U/L (ref 1–32)
BASOPHILS # BLD AUTO: 0.06 10*3/MM3 (ref 0–0.2)
BASOPHILS NFR BLD AUTO: 0.9 % (ref 0–1.5)
BILIRUB SERPL-MCNC: 0.2 MG/DL (ref 0–1.2)
BUN SERPL-MCNC: 20 MG/DL (ref 8–23)
BUN/CREAT SERPL: 12.5 (ref 7–25)
CALCIUM SPEC-SCNC: 9.3 MG/DL (ref 8.6–10.5)
CHLORIDE SERPL-SCNC: 109 MMOL/L (ref 98–107)
CO2 SERPL-SCNC: 23 MMOL/L (ref 22–29)
CREAT SERPL-MCNC: 1.6 MG/DL (ref 0.57–1)
DEPRECATED RDW RBC AUTO: 47.2 FL (ref 37–54)
EOSINOPHIL # BLD AUTO: 0.45 10*3/MM3 (ref 0–0.4)
EOSINOPHIL NFR BLD AUTO: 6.7 % (ref 0.3–6.2)
ERYTHROCYTE [DISTWIDTH] IN BLOOD BY AUTOMATED COUNT: 14.6 % (ref 12.3–15.4)
GFR SERPL CREATININE-BSD FRML MDRD: 33 ML/MIN/1.73
GLOBULIN UR ELPH-MCNC: 3.8 GM/DL
GLUCOSE BLDC GLUCOMTR-MCNC: 216 MG/DL (ref 70–130)
GLUCOSE BLDC GLUCOMTR-MCNC: 280 MG/DL (ref 70–130)
GLUCOSE SERPL-MCNC: 195 MG/DL (ref 65–99)
HCT VFR BLD AUTO: 35.3 % (ref 34–46.6)
HGB BLD-MCNC: 10.9 G/DL (ref 12–15.9)
IMM GRANULOCYTES # BLD AUTO: 0.03 10*3/MM3 (ref 0–0.05)
IMM GRANULOCYTES NFR BLD AUTO: 0.4 % (ref 0–0.5)
LYMPHOCYTES # BLD AUTO: 2.06 10*3/MM3 (ref 0.7–3.1)
LYMPHOCYTES NFR BLD AUTO: 30.9 % (ref 19.6–45.3)
MCH RBC QN AUTO: 27.8 PG (ref 26.6–33)
MCHC RBC AUTO-ENTMCNC: 30.9 G/DL (ref 31.5–35.7)
MCV RBC AUTO: 90.1 FL (ref 79–97)
MONOCYTES # BLD AUTO: 0.43 10*3/MM3 (ref 0.1–0.9)
MONOCYTES NFR BLD AUTO: 6.4 % (ref 5–12)
NEUTROPHILS NFR BLD AUTO: 3.64 10*3/MM3 (ref 1.7–7)
NEUTROPHILS NFR BLD AUTO: 54.7 % (ref 42.7–76)
NRBC BLD AUTO-RTO: 0 /100 WBC (ref 0–0.2)
PLATELET # BLD AUTO: 244 10*3/MM3 (ref 140–450)
PMV BLD AUTO: 9.3 FL (ref 6–12)
POTASSIUM SERPL-SCNC: 4.9 MMOL/L (ref 3.5–5.2)
PROT SERPL-MCNC: 7 G/DL (ref 6–8.5)
RBC # BLD AUTO: 3.92 10*6/MM3 (ref 3.77–5.28)
SODIUM SERPL-SCNC: 138 MMOL/L (ref 136–145)
WBC # BLD AUTO: 6.67 10*3/MM3 (ref 3.4–10.8)

## 2020-12-22 PROCEDURE — 99239 HOSP IP/OBS DSCHRG MGMT >30: CPT | Performed by: STUDENT IN AN ORGANIZED HEALTH CARE EDUCATION/TRAINING PROGRAM

## 2020-12-22 PROCEDURE — 63710000001 INSULIN ASPART PER 5 UNITS: Performed by: STUDENT IN AN ORGANIZED HEALTH CARE EDUCATION/TRAINING PROGRAM

## 2020-12-22 PROCEDURE — 85025 COMPLETE CBC W/AUTO DIFF WBC: CPT | Performed by: STUDENT IN AN ORGANIZED HEALTH CARE EDUCATION/TRAINING PROGRAM

## 2020-12-22 PROCEDURE — 80053 COMPREHEN METABOLIC PANEL: CPT | Performed by: STUDENT IN AN ORGANIZED HEALTH CARE EDUCATION/TRAINING PROGRAM

## 2020-12-22 PROCEDURE — 94660 CPAP INITIATION&MGMT: CPT

## 2020-12-22 PROCEDURE — 82962 GLUCOSE BLOOD TEST: CPT

## 2020-12-22 PROCEDURE — 25010000002 MORPHINE PER 10 MG: Performed by: STUDENT IN AN ORGANIZED HEALTH CARE EDUCATION/TRAINING PROGRAM

## 2020-12-22 RX ORDER — HYDROCODONE BITARTRATE AND ACETAMINOPHEN 7.5; 325 MG/1; MG/1
1 TABLET ORAL EVERY 6 HOURS PRN
Qty: 12 TABLET | Refills: 0 | Status: SHIPPED | OUTPATIENT
Start: 2020-12-22 | End: 2021-01-13

## 2020-12-22 RX ADMIN — CLOPIDOGREL BISULFATE 75 MG: 75 TABLET ORAL at 08:35

## 2020-12-22 RX ADMIN — ATORVASTATIN CALCIUM 40 MG: 40 TABLET, FILM COATED ORAL at 08:30

## 2020-12-22 RX ADMIN — METOPROLOL TARTRATE 100 MG: 50 TABLET, FILM COATED ORAL at 08:30

## 2020-12-22 RX ADMIN — PANTOPRAZOLE SODIUM 40 MG: 40 INJECTION, POWDER, FOR SOLUTION INTRAVENOUS at 06:15

## 2020-12-22 RX ADMIN — SODIUM CHLORIDE 150 ML/HR: 9 INJECTION, SOLUTION INTRAVENOUS at 01:02

## 2020-12-22 RX ADMIN — SODIUM BICARBONATE TAB 325 MG 325 MG: 325 TAB at 08:30

## 2020-12-22 RX ADMIN — ISOSORBIDE MONONITRATE 30 MG: 30 TABLET, EXTENDED RELEASE ORAL at 08:30

## 2020-12-22 RX ADMIN — CETIRIZINE HYDROCHLORIDE 10 MG: 10 TABLET, FILM COATED ORAL at 08:30

## 2020-12-22 RX ADMIN — INSULIN ASPART 8 UNITS: 100 INJECTION, SOLUTION INTRAVENOUS; SUBCUTANEOUS at 08:30

## 2020-12-22 RX ADMIN — INSULIN ASPART 12 UNITS: 100 INJECTION, SOLUTION INTRAVENOUS; SUBCUTANEOUS at 10:47

## 2020-12-22 RX ADMIN — ASPIRIN 81 MG: 81 TABLET, COATED ORAL at 08:30

## 2020-12-22 RX ADMIN — GABAPENTIN 400 MG: 400 CAPSULE ORAL at 08:30

## 2020-12-22 RX ADMIN — MORPHINE SULFATE 4 MG: 4 INJECTION, SOLUTION INTRAMUSCULAR; INTRAVENOUS at 06:19

## 2020-12-22 RX ADMIN — SODIUM CHLORIDE, PRESERVATIVE FREE 10 ML: 5 INJECTION INTRAVENOUS at 08:32

## 2020-12-22 NOTE — PLAN OF CARE
Pt on room air at this time. Pt wore CPAP for minimal amount of time this shift. RN aware. Pt in no distress at this time.

## 2020-12-22 NOTE — DISCHARGE SUMMARY
DISCHARGE SUMMARY    PATIENT NAME: Yamileth Slater       PHYSICIAN: Nirmal Calvillo MD  : 1960  MRN: [id number]    ADMITTED: 2020     DISCHARGED: 20     ADMISSION DIAGNOSES:  Active Hospital Problems    Diagnosis  POA   • Stage 3b chronic kidney disease [N18.32]  Yes   • Chronic obstructive pulmonary disease (CMS/HCC) [J44.9]  Yes   • Type 2 diabetes mellitus with diabetic polyneuropathy, with long-term current use of insulin (CMS/HCC) [E11.42, Z79.4]  Not Applicable   • GERD (gastroesophageal reflux disease) [K21.9]  Yes   • Tobacco dependence syndrome [F17.200]  Yes   • Essential hypertension [I10]  Yes      Resolved Hospital Problems    Diagnosis Date Resolved POA   • **Acute pancreatitis [K85.90] 2020 Yes     DISCHARGE DIAGNOSES:   Active Hospital Problems    Diagnosis  POA   • Stage 3b chronic kidney disease [N18.32]  Yes   • Chronic obstructive pulmonary disease (CMS/HCC) [J44.9]  Yes   • Type 2 diabetes mellitus with diabetic polyneuropathy, with long-term current use of insulin (CMS/HCC) [E11.42, Z79.4]  Not Applicable   • GERD (gastroesophageal reflux disease) [K21.9]  Yes   • Tobacco dependence syndrome [F17.200]  Yes   • Essential hypertension [I10]  Yes      Resolved Hospital Problems    Diagnosis Date Resolved POA   • **Acute pancreatitis [K85.90] 2020 Yes       SERVICE: [facility]  Attending: Kit Brar MD  Resident: Nirmal Calvillo MD    CONSULTS:   Consult Orders (all) (From admission, onward)    None          PROCEDURES:   None    HISTORY OF PRESENT ILLNESS:   HPI as per Dr. Griffin's H&P on admission on 20:    Yamileth Slater is a 60 y.o. female with a PMH of CAD s/p 3 CABG, Asthma, DMT2, GERD, recurrent nephrolithiasis, COPD, and tobacco use who presents with 5 days of abdominal pain.      Patient states that 5 days ago, she started experiencing pain that spanned her whole abdomen from her armpits to pant-line, all the way  across.  She says the pain spares her heart.  To date still has appendix and gall bladder.  Has not had this type of pain before in the past.  Does not abuse alcohol.  No other illicit substances.  Smokes 1-5 cigs per day now.     The pain was waxing and did not wane.  She could not define any provoking factors such as fatty foods.  Palliating factors are staying still.  Tylenol did not help.  Endorsed N/D. Denied V.  Has not had anything to eat since last night, except for her medications.    DIAGNOSTIC DATA:   Lab Results (last 24 hours)     Procedure Component Value Units Date/Time    POC Glucose Once [[id number]]  (Abnormal) Collected: 12/22/20 1004    Specimen: Blood Updated: 12/22/20 1047     Glucose 280 mg/dL      Comment: RN NotifiedOperator: [id number] JUAQUIN GONGORAFERMeter ID: [id number]       Comprehensive Metabolic Panel [[id number]]  (Abnormal) Collected: 12/22/20 0548    Specimen: Blood Updated: 12/22/20 0707     Glucose 195 mg/dL      BUN 20 mg/dL      Creatinine 1.60 mg/dL      Sodium 138 mmol/L      Potassium 4.9 mmol/L      Chloride 109 mmol/L      CO2 23.0 mmol/L      Calcium 9.3 mg/dL      Total Protein 7.0 g/dL      Albumin 3.20 g/dL      ALT (SGPT) 24 U/L      AST (SGOT) 39 U/L      Alkaline Phosphatase 146 U/L      Total Bilirubin 0.2 mg/dL      eGFR Non African Amer 33 mL/min/1.73      Globulin 3.8 gm/dL      A/G Ratio 0.8 g/dL      BUN/Creatinine Ratio 12.5     Anion Gap 6.0 mmol/L     Narrative:      GFR Normal >60  Chronic Kidney Disease <60  Kidney Failure <15      CBC Auto Differential [[id number]]  (Abnormal) Collected: 12/22/20 0548    Specimen: Blood Updated: 12/22/20 0648     WBC 6.67 10*3/mm3      RBC 3.92 10*6/mm3      Hemoglobin 10.9 g/dL      Hematocrit 35.3 %      MCV 90.1 fL      MCH 27.8 pg      MCHC 30.9 g/dL      RDW 14.6 %      RDW-SD 47.2 fl      MPV 9.3 fL      Platelets 244 10*3/mm3      Neutrophil % 54.7 %      Lymphocyte % 30.9 %      Monocyte % 6.4 %      Eosinophil %  6.7 %      Basophil % 0.9 %      Immature Grans % 0.4 %      Neutrophils, Absolute 3.64 10*3/mm3      Lymphocytes, Absolute 2.06 10*3/mm3      Monocytes, Absolute 0.43 10*3/mm3      Eosinophils, Absolute 0.45 10*3/mm3      Basophils, Absolute 0.06 10*3/mm3      Immature Grans, Absolute 0.03 10*3/mm3      nRBC 0.0 /100 WBC     POC Glucose Once [[id number]]  (Abnormal) Collected: 12/22/20 0554    Specimen: Blood Updated: 12/22/20 0615     Glucose 216 mg/dL      Comment: RN NotifiedOperator: [id number] JUDD MORGANMeter ID: [id number]       POC Glucose Once [[id number]]  (Abnormal) Collected: 12/21/20 1902    Specimen: Blood Updated: 12/21/20 2114     Glucose 206 mg/dL      Comment: RN NotifiedOperator: [id number] PATRICIA LEHMANNEYMeter ID: [id number]       POC Glucose Once [[id number]]  (Normal) Collected: 12/21/20 1630    Specimen: Blood Updated: 12/21/20 1647     Glucose 123 mg/dL      Comment: RN NotifiedOperator: [id number] STONE WHITNEYMeter ID: [id number]           Imaging Results (Most Recent)     Procedure Component Value Units Date/Time    CT Abdomen Pelvis Without Contrast [[id number]] Collected: 12/18/20 1223     Updated: 12/18/20 1255    Narrative:        PROCEDURE: Ct abdomen and pelvis without contrast    REASON FOR EXAM: abdominal pain/distension    FINDINGS: Comparison study dated  December 12, 2016. Axial  computer tomography sequential imaging was performed from the  diaphragms through the symphysis pubis without IV contrast  administration. Sagittal and coronal reformates was performed.  This exam was performed according to our departmental dose  optimization program, which includes automated exposure control,  adjustment of the mA and/or KV according to patient size and/or  use of iterative reconstruction technique.     Imaging through lung bases reveals no abnormality.    The liver is normal. The gallbladder is normal. The biliary  system is normal. The pancreas is normal. The spleen is  normal.  Bilateral adrenal glands are normal. Right kidney and ureter are  normal. Left kidney and ureter are normal. The bladder is normal.  The uterus is surgically absent. The hollow viscera is normal.  The appendix is identified appears normal. No lymphadenopathy in  the abdomen or the pelvis. Very small shotty left and right  para-aortic lymph nodes all which have a short axis diameter of  less than 1 cm and by strict size criteria would be benign. No  acute osseous abnormality.      Impression:      1. No acute CT abdomen or pelvis abnormality..    Electronically signed by:  Young Johnson MD  12/18/2020 12:54 PM CST  Workstation: [id number]           HOSPITAL COURSE:  She was admitted for pancreatitis due to lipase elevated to 2613 and placed NPO. Her abdominal pain improved, and lipase trended down so her diet was advanced. She tolerated PO intake and had significant improvement of abdominal pain so was deemed safe for discharge with PCP follow up. Victoza was discontinued.    DISCHARGE CONDITION:   Stable    DISPOSITION:  Home or Self Care    DISCHARGE MEDICATIONS     Discharge Medications      New Medications      Instructions Start Date   HYDROcodone-acetaminophen 7.5-325 MG per tablet  Commonly known as: NORCO   Take 1 tablet by mouth Every 6 (Six) Hours As Needed for Moderate Pain.         Changes to Medications      Instructions Start Date   metoprolol tartrate 100 MG tablet  Commonly known as: LOPRESSOR  What changed: when to take this  Notes to patient: 12/22/2020   100 mg, Oral, Every 12 Hours Scheduled         Continue These Medications      Instructions Start Date   albuterol sulfate  (90 Base) MCG/ACT inhaler  Commonly known as: PROVENTIL HFA;VENTOLIN HFA;PROAIR HFA  Notes to patient: As needed   2 puffs, Inhalation, Every 4 Hours PRN      aspirin 81 MG EC tablet  Notes to patient: 12/23/2020   81 mg, Oral, Daily      atorvastatin 40 MG tablet  Commonly known as: LIPITOR  Notes to patient:  12/23/2020   40 mg, Oral, Daily      cetirizine 10 MG tablet  Commonly known as: zyrTEC  Notes to patient: 12/23/2020   10 mg, Oral, Daily      clopidogrel 75 MG tablet  Commonly known as: PLAVIX  Notes to patient: 12/23/2020   75 mg, Oral, Daily      [employer] Blood Glucose Meter w/Device kit  Notes to patient: 12/22/2020   1 each, Does not apply, 3 Times Daily      cyclobenzaprine 10 MG tablet  Commonly known as: FLEXERIL  Notes to patient: 12/22/2020   10 mg, Oral, 2 Times Daily PRN      Empagliflozin 10 MG tablet  Commonly known as: Jardiance  Notes to patient: 12/23/2020   10 mg, Oral, Every Morning      ferrous sulfate 325 (65 FE) MG tablet  Commonly known as: FeroSul  Notes to patient: 12/23/2020   325 mg, Oral, Daily With Breakfast      gabapentin 800 MG tablet  Commonly known as: NEURONTIN  Notes to patient: 12/22/2020   800 mg, Oral, 3 Times Daily, For neuropathy      glucose blood test strip  Notes to patient: As directed   Use as instructed      Insulin Glargine 100 UNIT/ML injection pen  Commonly known as: Lantus SoloStar  Notes to patient: 12/22/2020   95 Units, Subcutaneous, Every 12 Hours      Insulin Pen Needle 30G X 8 MM misc  Commonly known as: NovoFine  Notes to patient: As directed   As directed 4 times daily      Easy Touch Pen Needles 31G X 8 MM misc  Generic drug: Insulin Pen Needle  Notes to patient: As directed   No dose, route, or frequency recorded.      isosorbide mononitrate 30 MG 24 hr tablet  Commonly known as: IMDUR  Notes to patient: 12/23/2020   30 mg, Oral, Daily      lisinopril 10 MG tablet  Commonly known as: PRINIVIL,ZESTRIL  Notes to patient: 12/23/2020   10 mg, Oral, Daily      montelukast 10 MG tablet  Commonly known as: SINGULAIR  Notes to patient: 12/22/2020   10 mg, Oral, Nightly      nitroglycerin 0.4 MG SL tablet  Commonly known as: NITROSTAT  Notes to patient: As needed   0.4 mg, Sublingual, As Needed, Take no more than 3 doses in 15 minutes.       nystatin [id number] UNIT/GM  powder  Commonly known as: MYCOSTATIN  Notes to patient: 12/22/2020   Topical, 3 Times Daily      oxybutynin XL 5 MG 24 hr tablet  Commonly known as: DITROPAN-XL  Notes to patient: 12/23/2020   5 mg, Oral, Daily      pantoprazole 40 MG EC tablet  Commonly known as: PROTONIX  Notes to patient: 12/22/2020   40 mg, Oral, Nightly      potassium chloride 10 MEQ CR capsule  Commonly known as: MICRO-K  Notes to patient: 12/23/2020   10 mEq, Oral, Daily      promethazine 25 MG tablet  Commonly known as: PHENERGAN  Notes to patient: As needed   25 mg, Oral, Every 6 Hours PRN      sodium bicarbonate 325 MG tablet  Notes to patient: 12/22/2020   325 mg, Oral, 2 Times Daily         Stop These Medications    Victoza 18 MG/3ML solution pen-injector injection  Generic drug: Liraglutide            INSTRUCTIONS:  Activity:   Activity Instructions     Activity as Tolerated          Diet:   Diet Instructions     Diet: Consistent Carbohydrate, Cardiac, Renal      Discharge Diet:  Consistent Carbohydrate  Cardiac  Renal             FOLLOW UP:   Additional Instructions for the Follow-ups that You Need to Schedule     Ambulatory Referral to Home Health   As directed      Face to Face Visit Date: 12/22/2020    Follow-up provider for Plan of Care?: I treated the patient in an acute care facility and will not continue treatment after discharge.    Follow-up provider: NIRMAL LOPEZ [[id number]]    Reason/Clinical Findings: Acute pancreatitis, diabetes    Describe mobility limitations that make leaving home difficult: Physical deconditioning    Nursing/Therapeutic Services Requested: Skilled Nursing    Skilled nursing orders: Medication education    Frequency: 1 Week 1           Follow-up Information     Nirmal Lopez MD. Schedule an appointment as soon as possible for a visit in 1 week(s).    Specialties: Family Medicine, Emergency Medicine  Why: Hospital discharge follow up  Contact information:  [address]  [facility]  [address]  [phone]                   PENDING TEST RESULTS AT DISCHARGE      Time: >30 minutes was spent in discharge planning, medication reconciliation and coordination of care for this patient.    Kit Brar MD is the attending at time of discharge, He is aware of the patient's status and agrees with the above discharge summary.          This document has been electronically signed by Nirmal Calvillo MD on December 22, 2020 11:54 CST

## 2020-12-22 NOTE — PROGRESS NOTES
FAMILY MEDICINE DAILY PROGRESS NOTE    NAME: Yamileth Slater  : 1960  MRN: [id number]      LOS: 4 days     PROVIDER OF SERVICE: Mone Us MD    Chief Complaint: Acute pancreatitis    Subjective:     Interval History:  History taken from: patient  Patient had no overnight events. Vital signs stable. Patient is not complaining of nausea, vomiting, abdominal pain. Expresses she would like to go home. She has been tolerating po intake.     Review of Systems:   Review of Systems   Constitutional: Negative for activity change, appetite change, chills, fatigue and fever.   HENT: Negative for drooling, ear discharge, ear pain, facial swelling, hearing loss, mouth sores, rhinorrhea and sinus pain.    Eyes: Negative for pain, redness and itching.   Respiratory: Negative for cough, choking, chest tightness, shortness of breath and stridor.    Cardiovascular: Negative for chest pain, palpitations and leg swelling.   Gastrointestinal: Negative for abdominal distention, abdominal pain, anal bleeding, blood in stool, constipation, diarrhea and nausea.   Endocrine: Negative for heat intolerance, polydipsia and polyphagia.   Genitourinary: Negative for dysuria, flank pain, frequency and genital sores.   Musculoskeletal: Negative for back pain, gait problem, joint swelling and myalgias.   Skin: Negative for pallor and rash.   Neurological: Negative for seizures, facial asymmetry, speech difficulty, light-headedness, numbness and headaches.   Hematological: Negative for adenopathy. Does not bruise/bleed easily.   Psychiatric/Behavioral: Negative for confusion, decreased concentration, dysphoric mood and hallucinations. The patient is not nervous/anxious and is not hyperactive.        Objective:     Vital Signs  Temp:  [96.4 °F (35.8 °C)-98.6 °F (37 °C)] 96.4 °F (35.8 °C)  Heart Rate:  [35-72] 65  Resp:  [10-20] 18  BP: (131-182)/(64-79) 171/67  Body mass index is 53.3 kg/m².    Physical Exam  Physical  Exam  Constitutional:       Appearance: She is well-developed.   HENT:      Head: Normocephalic and atraumatic.      Right Ear: External ear normal.      Left Ear: External ear normal.      Nose: Nose normal.   Eyes:      Conjunctiva/sclera: Conjunctivae normal.      Pupils: Pupils are equal, round, and reactive to light.   Neck:      Musculoskeletal: Normal range of motion and neck supple.   Cardiovascular:      Rate and Rhythm: Normal rate and regular rhythm.      Heart sounds: Normal heart sounds.   Pulmonary:      Effort: Pulmonary effort is normal.      Breath sounds: Normal breath sounds.   Abdominal:      General: Bowel sounds are normal.      Palpations: Abdomen is soft.   Musculoskeletal: Normal range of motion.   Skin:     General: Skin is warm and dry.   Neurological:      Mental Status: She is alert and oriented to person, place, and time.   Psychiatric:         Behavior: Behavior normal.         Thought Content: Thought content normal.         Judgment: Judgment normal.         Medication Review    Current Facility-Administered Medications:   •  albuterol sulfate HFA (PROVENTIL HFA;VENTOLIN HFA;PROAIR HFA) inhaler 2 puff, 2 puff, Inhalation, Q4H PRN, Eduin Griffin MD  •  aspirin EC tablet 81 mg, 81 mg, Oral, Daily, Eduin Griffin MD, 81 mg at 12/21/20 0840  •  atorvastatin (LIPITOR) tablet 40 mg, 40 mg, Oral, Daily, Eduin Griffin MD, 40 mg at 12/21/20 0838  •  cetirizine (zyrTEC) tablet 10 mg, 10 mg, Oral, Daily, Eduin Griffin MD, 10 mg at 12/21/20 0838  •  clopidogrel (PLAVIX) tablet 75 mg, 75 mg, Oral, Daily, Eduin Griffin MD, 75 mg at 12/21/20 0839  •  cyclobenzaprine (FLEXERIL) tablet 10 mg, 10 mg, Oral, BID PRN, Eduin Griffin MD, 10 mg at 12/20/20 1729  •  dextrose (D50W) 25 g/ 50mL Intravenous Solution 25 g, 25 g, Intravenous, Q15 Min PRN, Eduin Griffin MD  •  dextrose (GLUTOSE) oral gel 15 g, 15 g, Oral, Q15 Min PRN, Eduin Griffin MD  •  enoxaparin (LOVENOX) syringe 40 mg, 40 mg, Subcutaneous, Q24H, Eduin Griffin,  MD, 40 mg at 12/21/20 1719  •  gabapentin (NEURONTIN) capsule 400 mg, 400 mg, Oral, Q12H, Eduin Griffin MD, 400 mg at 12/21/20 2037  •  glucagon (human recombinant) (GLUCAGEN DIAGNOSTIC) injection 1 mg, 1 mg, Subcutaneous, Q15 Min PRN, Eduni Griffin MD, 1 mg at 12/19/20 0622  •  hydrALAZINE (APRESOLINE) injection 10 mg, 10 mg, Intravenous, Q6H PRN, Eduin Griffin MD  •  insulin aspart (novoLOG) injection 0-24 Units, 0-24 Units, Subcutaneous, TID AC, Eduin Griffin MD, 16 Units at 12/21/20 1148  •  insulin detemir (LEVEMIR) injection 30 Units, 30 Units, Subcutaneous, Nightly, Eduin Griffin MD, 30 Units at 12/21/20 2038  •  isosorbide mononitrate (IMDUR) 24 hr tablet 30 mg, 30 mg, Oral, Q24H, Eduin Griffin MD, 30 mg at 12/21/20 0839  •  magic butt ointment, , Topical, PRN, Haily Mcneil MD, Given at 12/21/20 1700  •  metoprolol tartrate (LOPRESSOR) tablet 100 mg, 100 mg, Oral, Q12H, Eduin Griffin MD, 100 mg at 12/21/20 2037  •  montelukast (SINGULAIR) tablet 10 mg, 10 mg, Oral, Nightly, Eduin Griffin MD, 10 mg at 12/21/20 2037  •  morphine injection 4 mg, 4 mg, Intravenous, Q4H PRN, 4 mg at 12/22/20 0619 **AND** naloxone (NARCAN) injection 0.4 mg, 0.4 mg, Intravenous, Q5 Min PRN, Eduin Griffin MD  •  nicotine (NICODERM CQ) 14 MG/24HR patch 1 patch, 1 patch, Transdermal, Q24H, Eduin Griffin MD, 1 patch at 12/21/20 1719  •  nystatin (MYCOSTATIN) powder, , Topical, Daily PRN, Haily Mcneil MD  •  ondansetron (ZOFRAN) injection 4 mg, 4 mg, Intravenous, Q6H PRN, Eduin Griffin MD, 4 mg at 12/20/20 1204  •  pantoprazole (PROTONIX) injection 40 mg, 40 mg, Intravenous, Q AM, Eduin Griffin MD, 40 mg at 12/22/20 0615  •  sennosides-docusate (PERICOLACE) 8.6-50 MG per tablet 1 tablet, 1 tablet, Oral, Nightly, Haily Mcneil MD, 1 tablet at 12/21/20 2037  •  sodium bicarbonate tablet 325 mg, 325 mg, Oral, BID, Eduin Griffin MD, 325 mg at 12/21/20 2037  •  sodium chloride 0.9 % flush 10 mL, 10 mL, Intravenous, PRN, Bebeto Coyle, PAShivC  •  sodium  chloride 0.9 % flush 10 mL, 10 mL, Intravenous, Q12H, Eduin Griffin MD, 10 mL at 12/21/20 0839  •  sodium chloride 0.9 % flush 10 mL, 10 mL, Intravenous, PRN, Eduin Griffin MD  •  sodium chloride 0.9 % infusion, 150 mL/hr, Intravenous, Continuous, Mone Us MD, Last Rate: 150 mL/hr at 12/22/20 0102, 150 mL/hr at 12/22/20 0102     Diagnostic Data    Lab Results (last 24 hours)     Procedure Component Value Units Date/Time    Comprehensive Metabolic Panel [[id number]]  (Abnormal) Collected: 12/22/20 0548    Specimen: Blood Updated: 12/22/20 0707     Glucose 195 mg/dL      BUN 20 mg/dL      Creatinine 1.60 mg/dL      Sodium 138 mmol/L      Potassium 4.9 mmol/L      Chloride 109 mmol/L      CO2 23.0 mmol/L      Calcium 9.3 mg/dL      Total Protein 7.0 g/dL      Albumin 3.20 g/dL      ALT (SGPT) 24 U/L      AST (SGOT) 39 U/L      Alkaline Phosphatase 146 U/L      Total Bilirubin 0.2 mg/dL      eGFR Non African Amer 33 mL/min/1.73      Globulin 3.8 gm/dL      A/G Ratio 0.8 g/dL      BUN/Creatinine Ratio 12.5     Anion Gap 6.0 mmol/L     Narrative:      GFR Normal >60  Chronic Kidney Disease <60  Kidney Failure <15      CBC Auto Differential [[id number]]  (Abnormal) Collected: 12/22/20 0548    Specimen: Blood Updated: 12/22/20 0648     WBC 6.67 10*3/mm3      RBC 3.92 10*6/mm3      Hemoglobin 10.9 g/dL      Hematocrit 35.3 %      MCV 90.1 fL      MCH 27.8 pg      MCHC 30.9 g/dL      RDW 14.6 %      RDW-SD 47.2 fl      MPV 9.3 fL      Platelets 244 10*3/mm3      Neutrophil % 54.7 %      Lymphocyte % 30.9 %      Monocyte % 6.4 %      Eosinophil % 6.7 %      Basophil % 0.9 %      Immature Grans % 0.4 %      Neutrophils, Absolute 3.64 10*3/mm3      Lymphocytes, Absolute 2.06 10*3/mm3      Monocytes, Absolute 0.43 10*3/mm3      Eosinophils, Absolute 0.45 10*3/mm3      Basophils, Absolute 0.06 10*3/mm3      Immature Grans, Absolute 0.03 10*3/mm3      nRBC 0.0 /100 WBC     POC Glucose Once [[id number]]  (Abnormal) Collected:  12/22/20 0554    Specimen: Blood Updated: 12/22/20 0615     Glucose 216 mg/dL      Comment: RN NotifiedOperator: [id number] JUDD HUFFMeter ID: [id number]       POC Glucose Once [[id number]]  (Abnormal) Collected: 12/21/20 1902    Specimen: Blood Updated: 12/21/20 2114     Glucose 206 mg/dL      Comment: RN NotifiedOperator: [id number] PATRICIA POLLOCKMeter ID: [id number]       POC Glucose Once [[id number]]  (Normal) Collected: 12/21/20 1630    Specimen: Blood Updated: 12/21/20 1647     Glucose 123 mg/dL      Comment: RN NotifiedOperator: [id number] STONE KANDISMeter ID: [id number]       POC Glucose Once [[id number]]  (Abnormal) Collected: 12/21/20 1045    Specimen: Blood Updated: 12/21/20 1107     Glucose 331 mg/dL      Comment: RN NotifiedOperator: [id number] STONE KANDISMeter ID: [id number]               I reviewed the patient's new clinical results.  I reviewed the patient's new imaging results and agree with the interpretation.  I reviewed the patient's other test results and agree with the interpretation    Assessment/Plan:     Active Hospital Problems    Diagnosis POA   • **Acute pancreatitis [K85.90] Yes     Lipase 2613,  CT negative for rogerio, appy,  LFT's wnL,  Ca wnL on admission, will monitor.  Likely pancreatitis.  Patient is not a etoh abuser, but has hypertriglyceridemia at 388.  Also has a DPP-4 inhibitor.  Likely combination of these two factors.  Acute, no chronic changes found on CT abdomen.  - Tolerating regular diet   - Normal saline 150cc/hr  - CMP/CBC daily  - Morphine 4mg IV PRN pain     • Stage 3b chronic kidney disease [N18.32] Yes     -Continue home medications  -We will continue to monitor. Baseline Cr ~ 1.49 GFR 36 back in September 2020  - Hold nephrotoxic agents, lisinopril  -Daily CMPs     • Chronic obstructive pulmonary disease (CMS/HCC) [J44.9] Yes     Stable on room air.  - Hold home PO medications  -We'll provide duo nebs 4 times a day while awake and albuterol as needed  for wheezing and shortness of air  -We'll provide supplemental oxygen as needed in order to maintain saturation greater than 90%  -Encourage smoking cessation     • Type 2 diabetes mellitus with diabetic polyneuropathy, with long-term current use of insulin (CMS/[facility]) [E11.42, Z79.4] Not Applicable     Last A1C 9.5 12/4/20.  Poorly controlled.  Patient has gross sugars and proteins.  On glargine 100units QHS QAM  - Levemir 30 units QHS  - 7 Units with meals with high dose SS  - Holding all home diabetes medications     • GERD (gastroesophageal reflux disease) [K21.9] Yes     - IV protonix qday     • Tobacco dependence syndrome [F17.200] Yes     Smokes 1-5 cigarettes per day now.  Used to be 1 pack for 46 years.  - Nicotine patch     • Essential hypertension [I10] Yes     - Hold home PO medications  - Hydralizine 10mg IV PRN sbps >180         DVT prophylaxis: Lovenox  Code status is   Code Status and Medical Interventions:   Ordered at: 12/18/20 1439     Level Of Support Discussed With:    Patient     Code Status:    CPR     Medical Interventions (Level of Support Prior to Arrest):    Full       Plan for disposition:Where: home and When:  today           Mone Us M.D. PGY3  [facility]  [address] [place] [address]  Office: [phone]    This document has been electronically signed by Mone Us MD on December 22, 2020 07:21 CST

## 2020-12-23 ENCOUNTER — OFFICE VISIT (OUTPATIENT)
Dept: FAMILY MEDICINE CLINIC | Facility: CLINIC | Age: 60
End: 2020-12-23

## 2020-12-23 ENCOUNTER — READMISSION MANAGEMENT (OUTPATIENT)
Dept: CALL CENTER | Facility: HOSPITAL | Age: 60
End: 2020-12-23

## 2020-12-23 VITALS
TEMPERATURE: 97.6 F | SYSTOLIC BLOOD PRESSURE: 132 MMHG | BODY MASS INDEX: 53.3 KG/M2 | OXYGEN SATURATION: 98 % | HEIGHT: 62 IN | DIASTOLIC BLOOD PRESSURE: 88 MMHG | HEART RATE: 75 BPM

## 2020-12-23 DIAGNOSIS — E66.9 DIABETES MELLITUS TYPE 2 IN OBESE (HCC): ICD-10-CM

## 2020-12-23 DIAGNOSIS — Z09 HOSPITAL DISCHARGE FOLLOW-UP: Primary | ICD-10-CM

## 2020-12-23 DIAGNOSIS — IMO0002 UNCONTROLLED TYPE 2 DIABETES MELLITUS WITH DIABETIC POLYNEUROPATHY, WITH LONG-TERM CURRENT USE OF INSULIN: ICD-10-CM

## 2020-12-23 DIAGNOSIS — E11.69 DIABETES MELLITUS TYPE 2 IN OBESE (HCC): ICD-10-CM

## 2020-12-23 PROCEDURE — 99213 OFFICE O/P EST LOW 20 MIN: CPT | Performed by: STUDENT IN AN ORGANIZED HEALTH CARE EDUCATION/TRAINING PROGRAM

## 2020-12-23 RX ORDER — ALBUTEROL SULFATE 90 UG/1
2 AEROSOL, METERED RESPIRATORY (INHALATION) EVERY 4 HOURS PRN
Qty: 18 G | Refills: 1 | Status: SHIPPED | OUTPATIENT
Start: 2020-12-23 | End: 2021-02-25

## 2020-12-23 RX ORDER — CLOPIDOGREL BISULFATE 75 MG/1
75 TABLET ORAL DAILY
Qty: 30 TABLET | Refills: 5 | Status: SHIPPED | OUTPATIENT
Start: 2020-12-23 | End: 2021-03-26 | Stop reason: SDUPTHER

## 2020-12-23 RX ORDER — GABAPENTIN 800 MG/1
800 TABLET ORAL 3 TIMES DAILY
Qty: 90 TABLET | Refills: 0 | Status: CANCELLED | OUTPATIENT
Start: 2020-12-23

## 2020-12-23 RX ORDER — LISINOPRIL 10 MG/1
10 TABLET ORAL DAILY
Qty: 30 TABLET | Refills: 5 | Status: SHIPPED | OUTPATIENT
Start: 2020-12-23 | End: 2021-11-15 | Stop reason: HOSPADM

## 2020-12-23 RX ORDER — ATORVASTATIN CALCIUM 20 MG/1
TABLET, FILM COATED ORAL
COMMUNITY
Start: 2020-10-23 | End: 2021-01-13

## 2020-12-23 RX ORDER — CYCLOBENZAPRINE HCL 10 MG
10 TABLET ORAL 2 TIMES DAILY PRN
Qty: 60 TABLET | Refills: 5 | Status: SHIPPED | OUTPATIENT
Start: 2020-12-23 | End: 2021-03-26 | Stop reason: SDUPTHER

## 2020-12-23 NOTE — PROGRESS NOTES
Family Medicine Residency  Nirmal Calvillo MD    Subjective:     Yamileth Slater is a 60 y.o. female who presents for hospital follow up for pancreatitis. She was admitted for pancreatitis on 12/18/20 to 12/22/20 due to lipase elevated to 2613 and placed NPO. Her abdominal pain improved, and lipase trended down so her diet was advanced. She tolerated PO intake and had significant improvement of abdominal pain. Abdominal pain continue to improve and has been tolerating PO diet well. Has discontinued Victoza due to pancreatitis.     The following portions of the patient's history were reviewed and updated as appropriate: allergies, current medications, past family history, past medical history, past social history, past surgical history and problem list.    Past Medical Hx:  Past Medical History:   Diagnosis Date   • Anxiety states    • Asthma    • Carotid artery stenosis     DWIGHT 0-15%, LICA 0-15%. LICA stent 5/2014.   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  Normal LV systolic function with no wall motion abnormality.    • CARDIAC CATHETERIZATION  05/27/2014     LEFT Carotid Stent    • CARDIAC CATHETERIZATION N/A 7/14/2020    Procedure: Right Heart Cath;  Surgeon: Eugenio Barragan MD PhD;  Location: [facility] CATH INVASIVE LOCATION;  Service: Cardiology;  Laterality: N/A;   • CAROTID STENT Left 05/27/2014   • COLONOSCOPY  05/02/2016    Normal colon.Diverticulosis in the sigmoid colon.No specimens collected.    • CORONARY ARTERY BYPASS GRAFT  11/15/2013    CABG x 3 with LIMA to LAD and coronary endarterectomy to LAD, SVG to Ramus intermedius branch and SVG to PDA.    • CYSTOSCOPY BLADDER STONE LITHOTRIPSY Bilateral     6 times   • CYSTOSCOPY URETEROSCOPY LASER LITHOTRIPSY     • ENDOSCOPY  09/23/2015     Esophageal stricture present.Normal stomach.Normal duodenum.    • ENDOSCOPY  11/16/2015    w/ dilatation - Esophageal stricture present,Dilatation performed.Normal stomach and duodenum.    • HYSTERECTOMY     • INCISION AND DRAINAGE ABSCESS  01/02/2016    Incision and drainage of right perineal abscess.    • INCISION AND DRAINAGE ABSCESS  02/18/2014    Incision and Drainage of abscess of left lower leg    • OTHER SURGICAL HISTORY  01/25/2016    DESTRUCTION OF BENIGN LESION      • OTHER SURGICAL HISTORY  04/18/2014    ear/neck - L attempted CEA, Neck Dissection    • TUBAL ABDOMINAL LIGATION         Current Meds:    Current Outpatient Medications:   •  albuterol sulfate  (90 Base) MCG/ACT inhaler, Inhale 2 puffs Every 4 (Four) Hours As Needed for Wheezing., Disp: 18 g, Rfl: 1  •  aspirin (aspirin) 81 MG EC tablet, Take 1 tablet by mouth Daily., Disp: 31 tablet, Rfl: 6  •  atorvastatin (LIPITOR) 40 MG tablet, Take 1 tablet by mouth Daily., Disp: 90 tablet, Rfl: 0  •  Blood Glucose Monitoring Suppl ([employer] Blood Glucose Meter) w/Device kit, 1 each 3 (Three) Times a Day., Disp: 1 kit, Rfl: 3  •  cetirizine (zyrTEC) 10 MG tablet, Take 1 tablet by mouth Daily., Disp: 30  tablet, Rfl: 3  •  clopidogrel (PLAVIX) 75 MG tablet, Take 1 tablet by mouth Daily., Disp: 30 tablet, Rfl: 5  •  cyclobenzaprine (FLEXERIL) 10 MG tablet, Take 1 tablet by mouth 2 (Two) Times a Day As Needed for Muscle Spasms., Disp: 60 tablet, Rfl: 5  •  EASY TOUCH PEN NEEDLES 31G X 8 MM misc, , Disp: , Rfl:   •  ferrous sulfate (FeroSul) 325 (65 FE) MG tablet, Take 1 tablet by mouth Daily With Breakfast., Disp: 30 tablet, Rfl: 3  •  gabapentin (NEURONTIN) 800 MG tablet, TAKE 1 TABLET BY MOUTH 3 (THREE) TIMES A DAY. FOR NEUROPATHY, Disp: 90 tablet, Rfl: 0  •  glucose blood test strip, Use as instructed, Disp: 100 each, Rfl: 12  •  HYDROcodone-acetaminophen (NORCO) 7.5-325 MG per tablet, Take 1 tablet by mouth Every 6 (Six) Hours As Needed for Moderate Pain., Disp: 12 tablet, Rfl: 0  •  Insulin Glargine (Lantus SoloStar) 100 UNIT/ML injection pen, Inject 95 Units under the skin into the appropriate area as directed Every 12 (Twelve) Hours., Disp: 60 mL, Rfl: 11  •  Insulin Pen Needle (NovoFine) 30G X 8 MM misc, As directed 4 times daily, Disp: 100 each, Rfl: 11  •  isosorbide mononitrate (IMDUR) 30 MG 24 hr tablet, Take 30 mg by mouth Daily., Disp: , Rfl:   •  lisinopril (PRINIVIL,ZESTRIL) 10 MG tablet, Take 1 tablet by mouth Daily., Disp: 30 tablet, Rfl: 5  •  montelukast (SINGULAIR) 10 MG tablet, Take 1 tablet by mouth Every Night., Disp: 30 tablet, Rfl: 5  •  nitroglycerin (NITROSTAT) 0.4 MG SL tablet, Place 1 tablet under the tongue As Needed for Chest Pain. Take no more than 3 doses in 15 minutes., Disp: 30 tablet, Rfl: 3  •  nystatin (MYCOSTATIN) 853273 UNIT/GM powder, Apply  topically to the appropriate area as directed 3 (Three) Times a Day., Disp: 15 g, Rfl: 1  •  oxybutynin XL (DITROPAN-XL) 5 MG 24 hr tablet, Take 5 mg by mouth Daily., Disp: , Rfl:   •  pantoprazole (PROTONIX) 40 MG EC tablet, Take 1 tablet by mouth Every Night., Disp: 30 tablet, Rfl: 5  •  potassium chloride (MICRO-K) 10 MEQ CR capsule,  Take 1 capsule by mouth Daily., Disp: 30 capsule, Rfl: 5  •  promethazine (PHENERGAN) 25 MG tablet, Take 1 tablet by mouth Every 6 (Six) Hours As Needed for Nausea or Vomiting., Disp: 30 tablet, Rfl: 0  •  sodium bicarbonate 325 MG tablet, Take 1 tablet by mouth 2 (Two) Times a Day., Disp: 60 tablet, Rfl: 5  •  atorvastatin (LIPITOR) 20 MG tablet, , Disp: , Rfl:   •  Empagliflozin 25 MG tablet, Take 25 mg by mouth Daily., Disp: 30 tablet, Rfl: 1  •  metoprolol tartrate (LOPRESSOR) 100 MG tablet, Take 1 tablet by mouth Every 12 (Twelve) Hours for 30 days. (Patient taking differently: Take 100 mg by mouth 2 (Two) Times a Day.), Disp: 60 tablet, Rfl: 0  No current facility-administered medications for this visit.     Allergies:  Allergies   Allergen Reactions   • Adhesive Tape Hives   • Other      Bandaids, MRSA, SURECLOSE       Family Hx:  Family History   Problem Relation Age of Onset   • Heart disease Mother    • Cancer Mother    • Heart disease Father    • Heart disease Sister    • Cancer Sister    • Heart disease Brother         Social History:  Social History     Socioeconomic History   • Marital status:      Spouse name: wesley dumont   • Number of children: Not on file   • Years of education: Not on file   • Highest education level: Not on file   Tobacco Use   • Smoking status: Current Every Day Smoker     Packs/day: 0.25     Years: 46.00     Pack years: 11.50     Types: Cigarettes   • Smokeless tobacco: Never Used   Substance and Sexual Activity   • Alcohol use: No   • Drug use: Yes     Types: LSD, Marijuana, Methamphetamines     Comment: Lasted used in 2006   • Sexual activity: Not Currently     Partners: Male       Review of Systems  Review of Systems   Constitutional: Negative for activity change and appetite change.   HENT: Negative for congestion and trouble swallowing.    Respiratory: Negative for chest tightness and shortness of breath.    Cardiovascular: Negative for chest pain and palpitations.   "  Gastrointestinal: Positive for abdominal pain (Mild). Negative for abdominal distention.   Genitourinary: Negative for difficulty urinating and dysuria.   Musculoskeletal: Negative for arthralgias and myalgias.   Skin: Negative for color change and pallor.   Neurological: Negative for dizziness, light-headedness and headaches.        Worsening hand numbness   Psychiatric/Behavioral: Negative for agitation and behavioral problems.       Objective:     /88   Pulse 75   Temp 97.6 °F (36.4 °C)   Ht 157.5 cm (62\")   LMP  (LMP Unknown)   SpO2 98%   BMI 53.30 kg/m²   Physical Exam  Constitutional:       General: She is not in acute distress.     Appearance: She is well-developed.   HENT:      Head: Normocephalic and atraumatic.      Right Ear: External ear normal.      Left Ear: External ear normal.      Nose: Nose normal.   Eyes:      Extraocular Movements: Extraocular movements intact.      Pupils: Pupils are equal, round, and reactive to light.   Neck:      Musculoskeletal: Normal range of motion and neck supple.   Cardiovascular:      Rate and Rhythm: Normal rate and regular rhythm.      Heart sounds: Normal heart sounds. No murmur. No friction rub. No gallop.    Pulmonary:      Effort: Pulmonary effort is normal. No respiratory distress.      Breath sounds: Normal breath sounds. No wheezing or rales.   Abdominal:      General: Bowel sounds are normal. There is no distension.      Palpations: Abdomen is soft.      Tenderness: There is abdominal tenderness (R sided).   Musculoskeletal: Normal range of motion.      Right lower leg: No edema.      Left lower leg: No edema.   Skin:     General: Skin is warm.      Findings: No erythema.   Neurological:      Mental Status: She is alert and oriented to person, place, and time. Mental status is at baseline.   Psychiatric:         Mood and Affect: Mood normal.         Behavior: Behavior normal.          Assessment/Plan:     Diagnoses and all orders for this " visit:    1. Hospital discharge follow up        - Stable pancreatitis with pain improving and eating well. Victoza discontinued due to risk of pancreatitis    2. Diabetes mellitus type 2 in obese (CMS/[facility])  -     lisinopril (PRINIVIL,ZESTRIL) 10 MG tablet; Take 1 tablet by mouth Daily.  Dispense: 30 tablet; Refill: 5  - Will increase empagliflozin from 10mg to 25mg daily    3. Uncontrolled type 2 diabetes mellitus with diabetic polyneuropathy, with long-term current use of insulin (CMS/[facility])  -     gabapentin (NEURONTIN) 800 MG tablet; Take 1 tablet by mouth 3 (Three) Times a Day. For neuropathy  Dispense: 90 tablet; Refill: 0. Will refill in future. Mamadou reviewed and last filled 12/7/20 request# [id number]    Other orders  -     cyclobenzaprine (FLEXERIL) 10 MG tablet; Take 1 tablet by mouth 2 (Two) Times a Day As Needed for Muscle Spasms.  Dispense: 60 tablet; Refill: 5  -     clopidogrel (PLAVIX) 75 MG tablet; Take 1 tablet by mouth Daily.  Dispense: 30 tablet; Refill: 5  -     albuterol sulfate  (90 Base) MCG/ACT inhaler; Inhale 2 puffs Every 4 (Four) Hours As Needed for Wheezing.  Dispense: 18 g; Refill: 1    Continue healthy diet for pancreatitis and DM. Will call next week for CGM placement.     · Rx changes: Will increase empagliflozin from 10mg to 25mg daily  · Patient Education: Improve diet, call next week for CGM placement,   · Compliance at present is estimated to be excellent.   · Efforts to improve compliance (if necessary) will be directed at unnecessary at this time.     Follow-up:     Return in about 4 weeks (around 1/20/2021) for DM to review CGM.    Preventative:  Health Maintenance   Topic Date Due   • ZOSTER VACCINE (1 of 2) 03/10/2009   • ANNUAL WELLNESS VISIT  08/24/2016   • DIABETIC EYE EXAM  08/24/2016   • URINE MICROALBUMIN  10/05/2018   • DIABETIC FOOT EXAM  12/26/2019   • PAP SMEAR  01/04/2020   • LUNG CANCER SCREENING  01/10/2021   • COLONOSCOPY  05/02/2021   • HEMOGLOBIN A1C   06/04/2021   • LIPID PANEL  06/24/2021   • MAMMOGRAM  01/10/2022   • TDAP/TD VACCINES (3 - Td) 11/10/2024   • HEPATITIS C SCREENING  Completed   • Pneumococcal Vaccine 0-64  Completed   • INFLUENZA VACCINE  Completed     Recommended: none  Vaccine Counseling: N/A    Weight  -Class: Obese Class III extreme obesity: > or equal to 40kg/m2  -Patient's Body mass index is 53.3 kg/m². BMI is above normal parameters. Recommendations include: exercise counseling and nutrition counseling.   eat more fruits and vegetables, decrease soda or juice intake, increase water intake, increase physical activity and reduce screen time    Alcohol use:  reports no history of alcohol use.  Nicotine status  reports that she has been smoking cigarettes. She has a 11.50 pack-year smoking history. She has never used smokeless tobacco.    Goals     • Fasting Blood Glucose       Barriers: compliance with diet      • Quit smoking / using tobacco           RISK SCORE: 4          This document has been electronically signed by Nirmal Calvillo MD on December 23, 2020 11:55 CST

## 2020-12-23 NOTE — PAYOR COMM NOTE
"Marivel Denneyyanet   [facility]  phone [phone]  fax [phone]    REF# [id number]    BryonYamileth (60 y.o. 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                 Active Insurance as of 12/18/2020     Primary Coverage     Payor Plan Insurance Group Employer/Plan Group    HUMANA MEDICARE REPLACEMENT HUMANA MEDICARE REPLACEMENT [id number]     Payor Plan Address Payor Plan Phone Number Payor Plan Fax Number Effective Dates    [address] [address] [phone]  7/1/2020 - None Entered    [facility] [address]       Subscriber Name Subscriber Birth Date Member ID       YAMILETH SLATER 1960 [id number]           Secondary Coverage     Payor Plan Insurance Group Employer/Plan Group    [place]      Payor Plan Address Payor Plan Phone Number Payor Plan Fax Number Effective Dates    [address] [phone]  6/28/2019 - None Entered    [facility] [id number]       Subscriber Name Subscriber Birth Date Member ID       YAMILETH SLATER 1960 [id number]                 Emergency Contacts      (Rel.) Home Phone Work Phone Mobile Phone    Huy Slater (Spouse) [phone] -- [phone]    Yamileth Chavez (Daughter) [phone] -- " [phone]    Suzanne Rivera (Daughter) [phone] -- [phone]               Discharge Summary      Nirmal Calvillo MD at 20 1148     Attestation signed by Kit Brar MD at 20 6094    I have seen the patient on day of discharge. I have reviewed the notes, assessments, and/or procedures performed by Dr. Calvillo, I concur with her/his documentation and assessment and plan for Yamileth Slater.       This document has been electronically signed by Kit Brar MD on 2020 14:54 CST                          DISCHARGE SUMMARY    PATIENT NAME: Yamileth Slater       PHYSICIAN: Nirmal Calvillo MD  : 1960  MRN: [id number]    ADMITTED: 2020     DISCHARGED: 20     ADMISSION DIAGNOSES:  Active Hospital Problems    Diagnosis  POA   • Stage 3b chronic kidney disease [N18.32]  Yes   • Chronic obstructive pulmonary disease (CMS/HCC) [J44.9]  Yes   • Type 2 diabetes mellitus with diabetic polyneuropathy, with long-term current use of insulin (CMS/HCC) [E11.42, Z79.4]  Not Applicable   • GERD (gastroesophageal reflux disease) [K21.9]  Yes   • Tobacco dependence syndrome [F17.200]  Yes   • Essential hypertension [I10]  Yes      Resolved Hospital Problems    Diagnosis Date Resolved POA   • **Acute pancreatitis [K85.90] 2020 Yes     DISCHARGE DIAGNOSES:   Active Hospital Problems    Diagnosis  POA   • Stage 3b chronic kidney disease [N18.32]  Yes   • Chronic obstructive pulmonary disease (CMS/HCC) [J44.9]  Yes   • Type 2 diabetes mellitus with diabetic polyneuropathy, with long-term current use of insulin (CMS/HCC) [E11.42, Z79.4]  Not Applicable   • GERD (gastroesophageal reflux disease) [K21.9]  Yes   • Tobacco dependence syndrome [F17.200]  Yes   • Essential hypertension [I10]  Yes      Resolved Hospital Problems    Diagnosis Date Resolved POA   • **Acute pancreatitis [K85.90] 2020 Yes       SERVICE: Family Medicine  Residency  Attending: Kit Brar MD  Resident: Nirmal Calvillo MD    CONSULTS:   Consult Orders (all) (From admission, onward)    None          PROCEDURES:   None    HISTORY OF PRESENT ILLNESS:   HPI as per Dr. Griffin's H&P on admission on 12/18/20:    Yamileth Slater is a 60 y.o. female with a PMH of CAD s/p 3 CABG, Asthma, DMT2, GERD, recurrent nephrolithiasis, COPD, and tobacco use who presents with 5 days of abdominal pain.      Patient states that 5 days ago, she started experiencing pain that spanned her whole abdomen from her armpits to pant-line, all the way across.  She says the pain spares her heart.  To date still has appendix and gall bladder.  Has not had this type of pain before in the past.  Does not abuse alcohol.  No other illicit substances.  Smokes 1-5 cigs per day now.     The pain was waxing and did not wane.  She could not define any provoking factors such as fatty foods.  Palliating factors are staying still.  Tylenol did not help.  Endorsed N/D. Denied V.  Has not had anything to eat since last night, except for her medications.    DIAGNOSTIC DATA:   Lab Results (last 24 hours)     Procedure Component Value Units Date/Time    POC Glucose Once [[id number]]  (Abnormal) Collected: 12/22/20 1004    Specimen: Blood Updated: 12/22/20 1047     Glucose 280 mg/dL      Comment: RN NotifiedOperator: [id number] JUAQUIN CONTRERASPADMAJAFERMeter ID: [id number]       Comprehensive Metabolic Panel [[id number]]  (Abnormal) Collected: 12/22/20 0548    Specimen: Blood Updated: 12/22/20 0707     Glucose 195 mg/dL      BUN 20 mg/dL      Creatinine 1.60 mg/dL      Sodium 138 mmol/L      Potassium 4.9 mmol/L      Chloride 109 mmol/L      CO2 23.0 mmol/L      Calcium 9.3 mg/dL      Total Protein 7.0 g/dL      Albumin 3.20 g/dL      ALT (SGPT) 24 U/L      AST (SGOT) 39 U/L      Alkaline Phosphatase 146 U/L      Total Bilirubin 0.2 mg/dL      eGFR Non African Amer 33 mL/min/1.73      Globulin 3.8 gm/dL      A/G Ratio 0.8  g/dL      BUN/Creatinine Ratio 12.5     Anion Gap 6.0 mmol/L     Narrative:      GFR Normal >60  Chronic Kidney Disease <60  Kidney Failure <15      CBC Auto Differential [[id number]]  (Abnormal) Collected: 12/22/20 0548    Specimen: Blood Updated: 12/22/20 0648     WBC 6.67 10*3/mm3      RBC 3.92 10*6/mm3      Hemoglobin 10.9 g/dL      Hematocrit 35.3 %      MCV 90.1 fL      MCH 27.8 pg      MCHC 30.9 g/dL      RDW 14.6 %      RDW-SD 47.2 fl      MPV 9.3 fL      Platelets 244 10*3/mm3      Neutrophil % 54.7 %      Lymphocyte % 30.9 %      Monocyte % 6.4 %      Eosinophil % 6.7 %      Basophil % 0.9 %      Immature Grans % 0.4 %      Neutrophils, Absolute 3.64 10*3/mm3      Lymphocytes, Absolute 2.06 10*3/mm3      Monocytes, Absolute 0.43 10*3/mm3      Eosinophils, Absolute 0.45 10*3/mm3      Basophils, Absolute 0.06 10*3/mm3      Immature Grans, Absolute 0.03 10*3/mm3      nRBC 0.0 /100 WBC     POC Glucose Once [[id number]]  (Abnormal) Collected: 12/22/20 0554    Specimen: Blood Updated: 12/22/20 0615     Glucose 216 mg/dL      Comment: RN NotifiedOperator: [id number] JUDD HUFFMeter ID: [id number]       POC Glucose Once [[id number]]  (Abnormal) Collected: 12/21/20 1902    Specimen: Blood Updated: 12/21/20 2114     Glucose 206 mg/dL      Comment: RN NotifiedOperator: [id number] PATRICIA POLLOCKMeter ID: [id number]       POC Glucose Once [[id number]]  (Normal) Collected: 12/21/20 1630    Specimen: Blood Updated: 12/21/20 1647     Glucose 123 mg/dL      Comment: RN NotifiedOperator: [id number] STONE WHITNEYMeter ID: [id number]           Imaging Results (Most Recent)     Procedure Component Value Units Date/Time    CT Abdomen Pelvis Without Contrast [[id number]] Collected: 12/18/20 1223     Updated: 12/18/20 1255    Narrative:        PROCEDURE: Ct abdomen and pelvis without contrast    REASON FOR EXAM: abdominal pain/distension    FINDINGS: Comparison study dated  December 12, 2016. Axial  computer tomography  sequential imaging was performed from the  diaphragms through the symphysis pubis without IV contrast  administration. Sagittal and coronal reformates was performed.  This exam was performed according to our departmental dose  optimization program, which includes automated exposure control,  adjustment of the mA and/or KV according to patient size and/or  use of iterative reconstruction technique.     Imaging through lung bases reveals no abnormality.    The liver is normal. The gallbladder is normal. The biliary  system is normal. The pancreas is normal. The spleen is normal.  Bilateral adrenal glands are normal. Right kidney and ureter are  normal. Left kidney and ureter are normal. The bladder is normal.  The uterus is surgically absent. The hollow viscera is normal.  The appendix is identified appears normal. No lymphadenopathy in  the abdomen or the pelvis. Very small shotty left and right  para-aortic lymph nodes all which have a short axis diameter of  less than 1 cm and by strict size criteria would be benign. No  acute osseous abnormality.      Impression:      1. No acute CT abdomen or pelvis abnormality..    Electronically signed by:  Young Johnson MD  12/18/2020 12:54 PM CST  Workstation: [id number]           HOSPITAL COURSE:  She was admitted for pancreatitis due to lipase elevated to 2613 and placed NPO. Her abdominal pain improved, and lipase trended down so her diet was advanced. She tolerated PO intake and had significant improvement of abdominal pain so was deemed safe for discharge with PCP follow up. Victoza was discontinued.    DISCHARGE CONDITION:   Stable    DISPOSITION:  Home or Self Care    DISCHARGE MEDICATIONS     Discharge Medications      New Medications      Instructions Start Date   HYDROcodone-acetaminophen 7.5-325 MG per tablet  Commonly known as: NORCO   Take 1 tablet by mouth Every 6 (Six) Hours As Needed for Moderate Pain.         Changes to Medications      Instructions Start  Date   metoprolol tartrate 100 MG tablet  Commonly known as: LOPRESSOR  What changed: when to take this  Notes to patient: 12/22/2020   100 mg, Oral, Every 12 Hours Scheduled         Continue These Medications      Instructions Start Date   albuterol sulfate  (90 Base) MCG/ACT inhaler  Commonly known as: PROVENTIL HFA;VENTOLIN HFA;PROAIR HFA  Notes to patient: As needed   2 puffs, Inhalation, Every 4 Hours PRN      aspirin 81 MG EC tablet  Notes to patient: 12/23/2020   81 mg, Oral, Daily      atorvastatin 40 MG tablet  Commonly known as: LIPITOR  Notes to patient: 12/23/2020   40 mg, Oral, Daily      cetirizine 10 MG tablet  Commonly known as: zyrTEC  Notes to patient: 12/23/2020   10 mg, Oral, Daily      clopidogrel 75 MG tablet  Commonly known as: PLAVIX  Notes to patient: 12/23/2020   75 mg, Oral, Daily      [employer] Blood Glucose Meter w/Device kit  Notes to patient: 12/22/2020   1 each, Does not apply, 3 Times Daily      cyclobenzaprine 10 MG tablet  Commonly known as: FLEXERIL  Notes to patient: 12/22/2020   10 mg, Oral, 2 Times Daily PRN      Empagliflozin 10 MG tablet  Commonly known as: Jardiance  Notes to patient: 12/23/2020   10 mg, Oral, Every Morning      ferrous sulfate 325 (65 FE) MG tablet  Commonly known as: FeroSul  Notes to patient: 12/23/2020   325 mg, Oral, Daily With Breakfast      gabapentin 800 MG tablet  Commonly known as: NEURONTIN  Notes to patient: 12/22/2020   800 mg, Oral, 3 Times Daily, For neuropathy      glucose blood test strip  Notes to patient: As directed   Use as instructed      Insulin Glargine 100 UNIT/ML injection pen  Commonly known as: Lantus SoloStar  Notes to patient: 12/22/2020   95 Units, Subcutaneous, Every 12 Hours      Insulin Pen Needle 30G X 8 MM misc  Commonly known as: NovoFine  Notes to patient: As directed   As directed 4 times daily      Easy Touch Pen Needles 31G X 8 MM misc  Generic drug: Insulin Pen Needle  Notes to patient: As directed   No dose, route,  or frequency recorded.      isosorbide mononitrate 30 MG 24 hr tablet  Commonly known as: IMDUR  Notes to patient: 12/23/2020   30 mg, Oral, Daily      lisinopril 10 MG tablet  Commonly known as: PRINIVIL,ZESTRIL  Notes to patient: 12/23/2020   10 mg, Oral, Daily      montelukast 10 MG tablet  Commonly known as: SINGULAIR  Notes to patient: 12/22/2020   10 mg, Oral, Nightly      nitroglycerin 0.4 MG SL tablet  Commonly known as: NITROSTAT  Notes to patient: As needed   0.4 mg, Sublingual, As Needed, Take no more than 3 doses in 15 minutes.       nystatin 188301 UNIT/GM powder  Commonly known as: MYCOSTATIN  Notes to patient: 12/22/2020   Topical, 3 Times Daily      oxybutynin XL 5 MG 24 hr tablet  Commonly known as: DITROPAN-XL  Notes to patient: 12/23/2020   5 mg, Oral, Daily      pantoprazole 40 MG EC tablet  Commonly known as: PROTONIX  Notes to patient: 12/22/2020   40 mg, Oral, Nightly      potassium chloride 10 MEQ CR capsule  Commonly known as: MICRO-K  Notes to patient: 12/23/2020   10 mEq, Oral, Daily      promethazine 25 MG tablet  Commonly known as: PHENERGAN  Notes to patient: As needed   25 mg, Oral, Every 6 Hours PRN      sodium bicarbonate 325 MG tablet  Notes to patient: 12/22/2020   325 mg, Oral, 2 Times Daily         Stop These Medications    Victoza 18 MG/3ML solution pen-injector injection  Generic drug: Liraglutide            INSTRUCTIONS:  Activity:   Activity Instructions     Activity as Tolerated          Diet:   Diet Instructions     Diet: Consistent Carbohydrate, Cardiac, Renal      Discharge Diet:  Consistent Carbohydrate  Cardiac  Renal             FOLLOW UP:   Additional Instructions for the Follow-ups that You Need to Schedule     Ambulatory Referral to Home Health   As directed      Face to Face Visit Date: 12/22/2020    Follow-up provider for Plan of Care?: I treated the patient in an acute care facility and will not continue treatment after discharge.    Follow-up provider: JOHN  NIRMAL FLORES [[id number]]    Reason/Clinical Findings: Acute pancreatitis, diabetes    Describe mobility limitations that make leaving home difficult: Physical deconditioning    Nursing/Therapeutic Services Requested: Skilled Nursing    Skilled nursing orders: Medication education    Frequency: 1 Week 1           Follow-up Information     Nirmal Calvillo MD. Schedule an appointment as soon as possible for a visit in 1 week(s).    Specialties: Family Medicine, Emergency Medicine  Why: Hospital discharge follow up  Contact information:  [address] [place] [address] DRIVE  [facility] [address] [phone]                   PENDING TEST RESULTS AT DISCHARGE      Time: >30 minutes was spent in discharge planning, medication reconciliation and coordination of care for this patient.    Kit Brar MD is the attending at time of discharge, He is aware of the patient's status and agrees with the above discharge summary.          This document has been electronically signed by Nirmal Calvillo MD on December 22, 2020 11:54 CST       Electronically signed by Kit Brar MD at 12/22/20 1454       Discharge Order (From admission, onward)     Start     Ordered    12/22/20 0929  Discharge patient  Once     Expected Discharge Date: 12/22/20    Expected Discharge Time: Afternoon    Discharge Disposition: Home or Self Care    Physician of Record for Attribution - Please select from Treatment Team: KIT BRAR [[id number]]    Review needed by CMO to determine Physician of Record: No       Question Answer Comment   Physician of Record for Attribution - Please select from Treatment Team KIT BRAR    Review needed by CMO to determine Physician of Record No        12/22/20 9375

## 2020-12-23 NOTE — OUTREACH NOTE
Prep Survey      Responses   [facility] facility patient discharged from?  [facility]   Is LACE score < 7 ?  No   Emergency Room discharge w/ pulse ox?  No   Eligibility  Readm Mgmt   Discharge diagnosis  Acute pancreatitis   Does the patient have one of the following disease processes/diagnoses(primary or secondary)?  Other   Does the patient have Home health ordered?  No   Is there a DME ordered?  No   Comments regarding appointments  Needs f/u scheduled   Medication alerts for this patient  continue aspirin and plavix   Prep survey completed?  Yes          Astrid Salazar RN

## 2020-12-28 ENCOUNTER — READMISSION MANAGEMENT (OUTPATIENT)
Dept: CALL CENTER | Facility: HOSPITAL | Age: 60
End: 2020-12-28

## 2020-12-28 NOTE — OUTREACH NOTE
Medical Week 1 Survey      Responses   [facility] patient discharged from?  [facility]   Does the patient have one of the following disease processes/diagnoses(primary or secondary)?  Other   Week 1 attempt successful?  No   Unsuccessful attempts  Attempt 1          Sarah Perez RN

## 2020-12-29 ENCOUNTER — READMISSION MANAGEMENT (OUTPATIENT)
Dept: CALL CENTER | Facility: HOSPITAL | Age: 60
End: 2020-12-29

## 2020-12-29 NOTE — OUTREACH NOTE
Medical Week 1 Survey      Responses   [facility] patient discharged from?  [place]   Does the patient have one of the following disease processes/diagnoses(primary or secondary)?  Other   Week 1 attempt successful?  Yes   Call start time  1559   Call end time  1609   Discharge diagnosis  Acute pancreatitis   Meds reviewed with patient/caregiver?  Yes   Is the patient having any side effects they believe may be caused by any medication additions or changes?  No   Does the patient have all medications ordered at discharge?  Yes   Is the patient taking all medications as directed (includes completed medication regime)?  Yes   Does the patient have a primary care provider?   Yes   Does the patient have an appointment with their PCP within 7 days of discharge?  Yes   Has the patient kept scheduled appointments due by today?  Yes   What is the Home health agency?   Didn't need home health   Has home health visited the patient within 72 hours of discharge?  N/A   Psychosocial issues?  No   Comments  BS have been elevated, Victoza stopped while in hospital. Advised to call PCP in am and discuss elevated BS   Did the patient receive a copy of their discharge instructions?  Yes   Nursing interventions  Reviewed instructions with patient   What is the patient's perception of their health status since discharge?  Improving   Is the patient/caregiver able to teach back the hierarchy of who to call/visit for symptoms/problems? PCP, Specialist, Home health nurse, Urgent Care, ED, 911  Yes   Additional teach back comments  Right hand swollen today advised to elevate and warm compresses.   Week 1 call completed?  Yes          Christen Lara RN

## 2021-01-04 ENCOUNTER — READMISSION MANAGEMENT (OUTPATIENT)
Dept: CALL CENTER | Facility: HOSPITAL | Age: 61
End: 2021-01-04

## 2021-01-04 NOTE — OUTREACH NOTE
Medical Week 2 Survey      Responses   [facility] patient discharged from?  [place]   Does the patient have one of the following disease processes/diagnoses(primary or secondary)?  Other   Week 2 attempt successful?  No   Unsuccessful attempts  Attempt 1          Juliana Fields RN

## 2021-01-05 ENCOUNTER — READMISSION MANAGEMENT (OUTPATIENT)
Dept: CALL CENTER | Facility: HOSPITAL | Age: 61
End: 2021-01-05

## 2021-01-05 NOTE — OUTREACH NOTE
Medical Week 2 Survey      Responses   [facility] patient discharged from?  [place]   Does the patient have one of the following disease processes/diagnoses(primary or secondary)?  Other   Week 2 attempt successful?  Yes   Call start time  1632   Discharge diagnosis  Acute pancreatitis   Call end time  1640   Meds reviewed with patient/caregiver?  Yes   Is the patient taking all medications as directed (includes completed medication regime)?  Yes   Medication comments  Antibiotic and steriod prescribed patient has tested positive for COVID   Does the patient have an appointment with their PCP within 7 days of discharge?  Yes   Has the patient kept scheduled appointments due by today?  Yes   Comments  Discussed treatment of COVID, treating symptoms, quarantine and completing prescriptions that were prescribed.   What is the patient's perception of their health status since discharge?  New symptoms unrelated to diagnosis [Tested positive for COVID]   Week 2 Call Completed?  Yes          Christen Lara RN

## 2021-01-06 ENCOUNTER — TELEPHONE (OUTPATIENT)
Dept: FAMILY MEDICINE CLINIC | Facility: CLINIC | Age: 61
End: 2021-01-06

## 2021-01-06 NOTE — TELEPHONE ENCOUNTER
PATIENT CALLED AND WANTED TO LET YOU KNOW THAT SHE HAS TESTED POSITIVE FOR COVID. AND SHE SAID THAT ALSO THE VICTOZA THAT SHE WAS ON WAS SUPPOSE TO BE SWITCHED TO SOMETHING ELSE BUT IT NEVER GOT CALLED INTO [facility]. HER NUMBER TO CALL BACK [phone].          THANK YOU,      RAINER

## 2021-01-07 DIAGNOSIS — IMO0002 UNCONTROLLED TYPE 2 DIABETES MELLITUS WITH DIABETIC POLYNEUROPATHY, WITH LONG-TERM CURRENT USE OF INSULIN: ICD-10-CM

## 2021-01-07 NOTE — TELEPHONE ENCOUNTER
PATIENT CALLED AND LEFT A VOICEMAIL AND SAID SHE NEEDED TO SPEAK TO DR JOHN CRISTOBAL. SHE DIDN'T SPECIFY WHAT SHE NEEDED TO TALK TO HIM ABOUT. HER NUMBER [phone].          THANK YOU,        RAINER

## 2021-01-08 RX ORDER — GABAPENTIN 800 MG/1
TABLET ORAL
Qty: 90 TABLET | Refills: 0 | Status: SHIPPED | OUTPATIENT
Start: 2021-01-08 | End: 2021-02-16

## 2021-01-08 NOTE — TELEPHONE ENCOUNTER
Mamadou reviewed and appropriate, request# [id number]. UDS is up to date. Gabapentin 800mg TID refilled.          This document has been electronically signed by Nirmal Calvillo MD on January 8, 2021 17:46 CST

## 2021-01-11 ENCOUNTER — READMISSION MANAGEMENT (OUTPATIENT)
Dept: CALL CENTER | Facility: HOSPITAL | Age: 61
End: 2021-01-11

## 2021-01-11 NOTE — OUTREACH NOTE
Medical Week 3 Survey      Responses   [facility] patient discharged from?  [place]   Does the patient have one of the following disease processes/diagnoses(primary or secondary)?  Other   Week 3 attempt successful?  No   Unsuccessful attempts  Attempt 1          Neelam Macias RN

## 2021-01-13 ENCOUNTER — READMISSION MANAGEMENT (OUTPATIENT)
Dept: CALL CENTER | Facility: HOSPITAL | Age: 61
End: 2021-01-13

## 2021-01-13 ENCOUNTER — HOSPITAL ENCOUNTER (OUTPATIENT)
Facility: HOSPITAL | Age: 61
Setting detail: OBSERVATION
Discharge: HOME HEALTH | End: 2021-01-16
Attending: FAMILY MEDICINE | Admitting: STUDENT IN AN ORGANIZED HEALTH CARE EDUCATION/TRAINING PROGRAM

## 2021-01-13 ENCOUNTER — APPOINTMENT (OUTPATIENT)
Dept: GENERAL RADIOLOGY | Facility: HOSPITAL | Age: 61
End: 2021-01-13

## 2021-01-13 ENCOUNTER — APPOINTMENT (OUTPATIENT)
Dept: CT IMAGING | Facility: HOSPITAL | Age: 61
End: 2021-01-13

## 2021-01-13 ENCOUNTER — TELEPHONE (OUTPATIENT)
Dept: FAMILY MEDICINE CLINIC | Facility: CLINIC | Age: 61
End: 2021-01-13

## 2021-01-13 DIAGNOSIS — R06.00 DYSPNEA, UNSPECIFIED TYPE: ICD-10-CM

## 2021-01-13 DIAGNOSIS — N18.9 CHRONIC KIDNEY DISEASE, UNSPECIFIED CKD STAGE: ICD-10-CM

## 2021-01-13 DIAGNOSIS — U07.1 PNEUMONIA DUE TO COVID-19 VIRUS: Primary | ICD-10-CM

## 2021-01-13 DIAGNOSIS — J12.82 PNEUMONIA DUE TO COVID-19 VIRUS: Primary | ICD-10-CM

## 2021-01-13 LAB
ALBUMIN SERPL-MCNC: 2.9 G/DL (ref 3.5–5.2)
ALBUMIN/GLOB SERPL: 0.6 G/DL
ALP SERPL-CCNC: 141 U/L (ref 39–117)
ALT SERPL W P-5'-P-CCNC: 14 U/L (ref 1–33)
ANION GAP SERPL CALCULATED.3IONS-SCNC: 13 MMOL/L (ref 5–15)
AST SERPL-CCNC: 28 U/L (ref 1–32)
BASOPHILS # BLD AUTO: 0.02 10*3/MM3 (ref 0–0.2)
BASOPHILS NFR BLD AUTO: 0.3 % (ref 0–1.5)
BILIRUB SERPL-MCNC: 0.3 MG/DL (ref 0–1.2)
BUN SERPL-MCNC: 38 MG/DL (ref 8–23)
BUN/CREAT SERPL: 20.4 (ref 7–25)
CALCIUM SPEC-SCNC: 8.6 MG/DL (ref 8.6–10.5)
CHLORIDE SERPL-SCNC: 104 MMOL/L (ref 98–107)
CK SERPL-CCNC: 29 U/L (ref 20–180)
CO2 SERPL-SCNC: 21 MMOL/L (ref 22–29)
CREAT SERPL-MCNC: 1.86 MG/DL (ref 0.57–1)
D-DIMER, QUANTITATIVE (MAD,POW, STR): 1230 NG/ML (FEU) (ref 0–470)
DEPRECATED RDW RBC AUTO: 46.8 FL (ref 37–54)
EOSINOPHIL # BLD AUTO: 0.02 10*3/MM3 (ref 0–0.4)
EOSINOPHIL NFR BLD AUTO: 0.3 % (ref 0.3–6.2)
ERYTHROCYTE [DISTWIDTH] IN BLOOD BY AUTOMATED COUNT: 15 % (ref 12.3–15.4)
GFR SERPL CREATININE-BSD FRML MDRD: 28 ML/MIN/1.73
GLOBULIN UR ELPH-MCNC: 4.7 GM/DL
GLUCOSE BLDC GLUCOMTR-MCNC: 222 MG/DL (ref 70–130)
GLUCOSE SERPL-MCNC: 230 MG/DL (ref 65–99)
HCT VFR BLD AUTO: 36.4 % (ref 34–46.6)
HGB BLD-MCNC: 11.7 G/DL (ref 12–15.9)
HOLD SPECIMEN: NORMAL
HOLD SPECIMEN: NORMAL
IMM GRANULOCYTES # BLD AUTO: 0.04 10*3/MM3 (ref 0–0.05)
IMM GRANULOCYTES NFR BLD AUTO: 0.7 % (ref 0–0.5)
INR PPP: 1.03 (ref 0.8–1.2)
LYMPHOCYTES # BLD AUTO: 1.58 10*3/MM3 (ref 0.7–3.1)
LYMPHOCYTES NFR BLD AUTO: 26.6 % (ref 19.6–45.3)
MAGNESIUM SERPL-MCNC: 1.6 MG/DL (ref 1.6–2.4)
MCH RBC QN AUTO: 27.7 PG (ref 26.6–33)
MCHC RBC AUTO-ENTMCNC: 32.1 G/DL (ref 31.5–35.7)
MCV RBC AUTO: 86.1 FL (ref 79–97)
MONOCYTES # BLD AUTO: 0.41 10*3/MM3 (ref 0.1–0.9)
MONOCYTES NFR BLD AUTO: 6.9 % (ref 5–12)
NEUTROPHILS NFR BLD AUTO: 3.86 10*3/MM3 (ref 1.7–7)
NEUTROPHILS NFR BLD AUTO: 65.2 % (ref 42.7–76)
NRBC BLD AUTO-RTO: 0 /100 WBC (ref 0–0.2)
PLATELET # BLD AUTO: 208 10*3/MM3 (ref 140–450)
PMV BLD AUTO: 9.7 FL (ref 6–12)
POTASSIUM SERPL-SCNC: 4 MMOL/L (ref 3.5–5.2)
PROT SERPL-MCNC: 7.6 G/DL (ref 6–8.5)
PROTHROMBIN TIME: 13.9 SECONDS (ref 11.1–15.3)
RBC # BLD AUTO: 4.23 10*6/MM3 (ref 3.77–5.28)
SODIUM SERPL-SCNC: 138 MMOL/L (ref 136–145)
TROPONIN T SERPL-MCNC: 0.02 NG/ML (ref 0–0.03)
WBC # BLD AUTO: 5.93 10*3/MM3 (ref 3.4–10.8)

## 2021-01-13 PROCEDURE — 82962 GLUCOSE BLOOD TEST: CPT

## 2021-01-13 PROCEDURE — 93010 ELECTROCARDIOGRAM REPORT: CPT | Performed by: INTERNAL MEDICINE

## 2021-01-13 PROCEDURE — G0378 HOSPITAL OBSERVATION PER HR: HCPCS

## 2021-01-13 PROCEDURE — 80053 COMPREHEN METABOLIC PANEL: CPT | Performed by: FAMILY MEDICINE

## 2021-01-13 PROCEDURE — 63710000001 INSULIN DETEMIR PER 5 UNITS: Performed by: INTERNAL MEDICINE

## 2021-01-13 PROCEDURE — 87635 SARS-COV-2 COVID-19 AMP PRB: CPT | Performed by: INTERNAL MEDICINE

## 2021-01-13 PROCEDURE — 85610 PROTHROMBIN TIME: CPT | Performed by: FAMILY MEDICINE

## 2021-01-13 PROCEDURE — 84484 ASSAY OF TROPONIN QUANT: CPT | Performed by: FAMILY MEDICINE

## 2021-01-13 PROCEDURE — 25010000002 ONDANSETRON PER 1 MG: Performed by: INTERNAL MEDICINE

## 2021-01-13 PROCEDURE — 85025 COMPLETE CBC W/AUTO DIFF WBC: CPT | Performed by: FAMILY MEDICINE

## 2021-01-13 PROCEDURE — 96375 TX/PRO/DX INJ NEW DRUG ADDON: CPT

## 2021-01-13 PROCEDURE — 36415 COLL VENOUS BLD VENIPUNCTURE: CPT | Performed by: FAMILY MEDICINE

## 2021-01-13 PROCEDURE — 87040 BLOOD CULTURE FOR BACTERIA: CPT | Performed by: FAMILY MEDICINE

## 2021-01-13 PROCEDURE — 96361 HYDRATE IV INFUSION ADD-ON: CPT

## 2021-01-13 PROCEDURE — 83735 ASSAY OF MAGNESIUM: CPT | Performed by: FAMILY MEDICINE

## 2021-01-13 PROCEDURE — C9803 HOPD COVID-19 SPEC COLLECT: HCPCS

## 2021-01-13 PROCEDURE — 71045 X-RAY EXAM CHEST 1 VIEW: CPT

## 2021-01-13 PROCEDURE — 99284 EMERGENCY DEPT VISIT MOD MDM: CPT

## 2021-01-13 PROCEDURE — 25010000002 ENOXAPARIN PER 10 MG: Performed by: INTERNAL MEDICINE

## 2021-01-13 PROCEDURE — 82550 ASSAY OF CK (CPK): CPT | Performed by: FAMILY MEDICINE

## 2021-01-13 PROCEDURE — 96372 THER/PROPH/DIAG INJ SC/IM: CPT

## 2021-01-13 PROCEDURE — 93005 ELECTROCARDIOGRAM TRACING: CPT | Performed by: FAMILY MEDICINE

## 2021-01-13 PROCEDURE — 85379 FIBRIN DEGRADATION QUANT: CPT | Performed by: FAMILY MEDICINE

## 2021-01-13 RX ORDER — NYSTATIN 100000 [USP'U]/G
POWDER TOPICAL EVERY 12 HOURS SCHEDULED
Status: DISCONTINUED | OUTPATIENT
Start: 2021-01-13 | End: 2021-01-16 | Stop reason: HOSPADM

## 2021-01-13 RX ORDER — METOPROLOL TARTRATE 50 MG/1
50 TABLET, FILM COATED ORAL EVERY 12 HOURS SCHEDULED
Status: DISCONTINUED | OUTPATIENT
Start: 2021-01-13 | End: 2021-01-16 | Stop reason: HOSPADM

## 2021-01-13 RX ORDER — DEXTROSE MONOHYDRATE 25 G/50ML
25 INJECTION, SOLUTION INTRAVENOUS
Status: DISCONTINUED | OUTPATIENT
Start: 2021-01-13 | End: 2021-01-16 | Stop reason: HOSPADM

## 2021-01-13 RX ORDER — CLOPIDOGREL BISULFATE 75 MG/1
75 TABLET ORAL DAILY
Status: DISCONTINUED | OUTPATIENT
Start: 2021-01-13 | End: 2021-01-16 | Stop reason: HOSPADM

## 2021-01-13 RX ORDER — NICOTINE POLACRILEX 4 MG
15 LOZENGE BUCCAL
Status: DISCONTINUED | OUTPATIENT
Start: 2021-01-13 | End: 2021-01-16 | Stop reason: HOSPADM

## 2021-01-13 RX ORDER — MONTELUKAST SODIUM 10 MG/1
10 TABLET ORAL NIGHTLY
Status: DISCONTINUED | OUTPATIENT
Start: 2021-01-13 | End: 2021-01-16 | Stop reason: HOSPADM

## 2021-01-13 RX ORDER — PANTOPRAZOLE SODIUM 40 MG/1
40 TABLET, DELAYED RELEASE ORAL NIGHTLY
Status: DISCONTINUED | OUTPATIENT
Start: 2021-01-13 | End: 2021-01-16 | Stop reason: HOSPADM

## 2021-01-13 RX ORDER — SODIUM CHLORIDE 9 MG/ML
75 INJECTION, SOLUTION INTRAVENOUS CONTINUOUS
Status: DISPENSED | OUTPATIENT
Start: 2021-01-13 | End: 2021-01-14

## 2021-01-13 RX ORDER — SODIUM BICARBONATE 325 MG/1
325 TABLET ORAL 2 TIMES DAILY
Status: DISCONTINUED | OUTPATIENT
Start: 2021-01-13 | End: 2021-01-16 | Stop reason: HOSPADM

## 2021-01-13 RX ORDER — GABAPENTIN 300 MG/1
300 CAPSULE ORAL EVERY 12 HOURS SCHEDULED
Status: DISCONTINUED | OUTPATIENT
Start: 2021-01-13 | End: 2021-01-16 | Stop reason: HOSPADM

## 2021-01-13 RX ORDER — ONDANSETRON 2 MG/ML
4 INJECTION INTRAMUSCULAR; INTRAVENOUS EVERY 6 HOURS PRN
Status: DISCONTINUED | OUTPATIENT
Start: 2021-01-13 | End: 2021-01-16 | Stop reason: HOSPADM

## 2021-01-13 RX ORDER — ALBUTEROL SULFATE 90 UG/1
2 AEROSOL, METERED RESPIRATORY (INHALATION)
Status: DISCONTINUED | OUTPATIENT
Start: 2021-01-13 | End: 2021-01-16 | Stop reason: HOSPADM

## 2021-01-13 RX ORDER — DEXAMETHASONE SODIUM PHOSPHATE 4 MG/ML
6 VIAL (ML) INJECTION ONCE
Qty: 1 ML | Refills: 0 | Status: SHIPPED | OUTPATIENT
Start: 2021-01-13 | End: 2021-01-16 | Stop reason: HOSPADM

## 2021-01-13 RX ORDER — OXYBUTYNIN CHLORIDE 5 MG/1
5 TABLET, EXTENDED RELEASE ORAL DAILY
Status: DISCONTINUED | OUTPATIENT
Start: 2021-01-13 | End: 2021-01-16 | Stop reason: HOSPADM

## 2021-01-13 RX ORDER — ISOSORBIDE MONONITRATE 30 MG/1
30 TABLET, EXTENDED RELEASE ORAL DAILY
Status: DISCONTINUED | OUTPATIENT
Start: 2021-01-13 | End: 2021-01-16 | Stop reason: HOSPADM

## 2021-01-13 RX ADMIN — CLOPIDOGREL BISULFATE 75 MG: 75 TABLET ORAL at 20:23

## 2021-01-13 RX ADMIN — PANTOPRAZOLE SODIUM 40 MG: 40 TABLET, DELAYED RELEASE ORAL at 20:24

## 2021-01-13 RX ADMIN — OXYBUTYNIN CHLORIDE 5 MG: 5 TABLET, EXTENDED RELEASE ORAL at 20:23

## 2021-01-13 RX ADMIN — ISOSORBIDE MONONITRATE 30 MG: 30 TABLET, EXTENDED RELEASE ORAL at 20:24

## 2021-01-13 RX ADMIN — NYSTATIN: 100000 POWDER TOPICAL at 20:25

## 2021-01-13 RX ADMIN — METOPROLOL TARTRATE 50 MG: 50 TABLET, FILM COATED ORAL at 20:23

## 2021-01-13 RX ADMIN — GABAPENTIN 300 MG: 300 CAPSULE ORAL at 20:24

## 2021-01-13 RX ADMIN — ONDANSETRON 4 MG: 2 INJECTION INTRAMUSCULAR; INTRAVENOUS at 20:23

## 2021-01-13 RX ADMIN — INSULIN DETEMIR 50 UNITS: 100 INJECTION, SOLUTION SUBCUTANEOUS at 23:00

## 2021-01-13 RX ADMIN — SODIUM CHLORIDE 75 ML/HR: 9 INJECTION, SOLUTION INTRAVENOUS at 20:25

## 2021-01-13 RX ADMIN — SODIUM CHLORIDE 1000 ML: 9 INJECTION, SOLUTION INTRAVENOUS at 15:53

## 2021-01-13 RX ADMIN — SODIUM BICARBONATE 325 MG: 650 TABLET ORAL at 20:23

## 2021-01-13 RX ADMIN — ENOXAPARIN SODIUM 30 MG: 30 INJECTION SUBCUTANEOUS at 20:24

## 2021-01-13 RX ADMIN — MONTELUKAST SODIUM 10 MG: 10 TABLET, FILM COATED ORAL at 20:24

## 2021-01-13 NOTE — H&P
History and Physical  Alvarez Escalona MD  Hospitalist    Date of admission: 1/13/2021    Patient Care Team:  Nirmal Calvillo MD as PCP - General (Family Medicine)    Chief complaint   Chief Complaint   Patient presents with   • Generalized weakness   • Cough   • Shortness of Breath     Subjective     Patient is a 60 y.o. female admitted for worsening dyspnea, dry cough, generalized weakness and possible low grade fever, symptoms present for the last several days. She has been diagnosed as Covid + a week or so ago as have been several of her family members.    History  Past Medical History:   Diagnosis Date   • Anxiety    • Carotid artery stenosis    • Chronic obstructive lung disease (CMS/[facility])    • CKD (chronic kidney disease) stage 4, GFR 15-29 ml/min (CMS/[facility])    • Colonic polyp    • Coronary arteriosclerosis    • Diabetes mellitus (CMS/[facility])    • Diabetic neuropathy (CMS/[facility])    • GERD (gastroesophageal reflux disease)    • Hypercholesterolemia    • Hypertension    • Morbid obesity (CMS/[facility])    • Nephrolithiasis    • Peripheral vascular disease (CMS/[facility])    • Sleep apnea    • Substance abuse (CMS/[facility])    • Vitamin D deficiency      Past Surgical History:   Procedure Laterality Date   • CARDIAC CATHETERIZATION N/A 7/14/2020   • CAROTID STENT Left    • COLONOSCOPY     • CORONARY ARTERY BYPASS GRAFT     • CYSTOSCOPY BLADDER STONE LITHOTRIPSY Bilateral      Family History   Problem Relation Age of Onset   • Heart disease Mother    • Heart disease Father    • Heart disease Sister    • Heart disease Brother      Social History     Tobacco Use   • Smoking status: Current Every Day Smoker     Packs/day: 0.25     Years: 46.00     Pack years: 11.50     Types: Cigarettes   • Smokeless tobacco: Never Used   Substance Use Topics   • Alcohol use: No   • Drug use: Yes     Types: LSD, Marijuana, Methamphetamines     Medications Prior to Admission   Medication Sig Dispense Refill Last Dose   • albuterol sulfate  (90  Base) MCG/ACT inhaler Inhale 2 puffs Every 4 (Four) Hours As Needed for Wheezing. 18 g 1 Past Week at Unknown time   • atorvastatin (LIPITOR) 40 MG tablet Take 1 tablet by mouth Daily. 90 tablet 0 1/12/2021 at Unknown time   • cetirizine (zyrTEC) 10 MG tablet Take 1 tablet by mouth Daily. 30 tablet 3 1/12/2021 at Unknown time   • clopidogrel (PLAVIX) 75 MG tablet Take 1 tablet by mouth Daily. 30 tablet 5 1/12/2021 at Unknown time   • cyclobenzaprine (FLEXERIL) 10 MG tablet Take 1 tablet by mouth 2 (Two) Times a Day As Needed for Muscle Spasms. 60 tablet 5 1/12/2021 at Unknown time   • Empagliflozin 25 MG tablet Take 25 mg by mouth Daily. 30 tablet 1 1/12/2021 at Unknown time   • ferrous sulfate (FeroSul) 325 (65 FE) MG tablet Take 1 tablet by mouth Daily With Breakfast. 30 tablet 3 1/12/2021 at Unknown time   • gabapentin (NEURONTIN) 800 MG tablet TAKE ONE TABLET BY MOUTH THREE TIMES A DAY FOR NEUOPATHY 90 tablet 0 1/12/2021 at Unknown time   • Insulin Glargine (Lantus SoloStar) 100 UNIT/ML injection pen Inject 95 Units under the skin into the appropriate area as directed Every 12 (Twelve) Hours. 60 mL 11 1/12/2021 at Unknown time   • isosorbide mononitrate (IMDUR) 30 MG 24 hr tablet Take 30 mg by mouth Daily.   1/12/2021 at Unknown time   • lisinopril (PRINIVIL,ZESTRIL) 10 MG tablet Take 1 tablet by mouth Daily. 30 tablet 5 1/12/2021 at Unknown time   • montelukast (SINGULAIR) 10 MG tablet Take 1 tablet by mouth Every Night. 30 tablet 5 1/12/2021 at Unknown time   • oxybutynin XL (DITROPAN-XL) 5 MG 24 hr tablet Take 5 mg by mouth Daily.   1/12/2021 at Unknown time   • pantoprazole (PROTONIX) 40 MG EC tablet Take 1 tablet by mouth Every Night. 30 tablet 5 1/12/2021 at Unknown time   • potassium chloride (MICRO-K) 10 MEQ CR capsule Take 1 capsule by mouth Daily. 30 capsule 5 1/12/2021 at Unknown time   • promethazine (PHENERGAN) 25 MG tablet Take 1 tablet by mouth Every 6 (Six) Hours As Needed for Nausea or Vomiting.  30 tablet 0 1/12/2021 at Unknown time   • sodium bicarbonate 325 MG tablet Take 1 tablet by mouth 2 (Two) Times a Day. 60 tablet 5 1/12/2021 at Unknown time   • Blood Glucose Monitoring Suppl ([employer] Blood Glucose Meter) w/Device kit 1 each 3 (Three) Times a Day. 1 kit 3    • EASY TOUCH PEN NEEDLES 31G X 8 MM misc       • glucose blood test strip Use as instructed 100 each 12    • Insulin Pen Needle (NovoFine) 30G X 8 MM misc As directed 4 times daily 100 each 11    • metoprolol tartrate (LOPRESSOR) 100 MG tablet Take 1 tablet by mouth Every 12 (Twelve) Hours for 30 days. (Patient taking differently: Take 100 mg by mouth 2 (Two) Times a Day.) 60 tablet 0    • nitroglycerin (NITROSTAT) 0.4 MG SL tablet Place 1 tablet under the tongue As Needed for Chest Pain. Take no more than 3 doses in 15 minutes. 30 tablet 3    • nystatin (MYCOSTATIN) 369211 UNIT/GM powder Apply  topically to the appropriate area as directed 3 (Three) Times a Day. 15 g 1      Allergies:  Adhesive tape and Other    Review of Systems  Review of Systems   Constitutional: Positive for fatigue and fever.   Respiratory: Positive for cough and shortness of breath. Negative for wheezing.    Cardiovascular: Negative for chest pain, palpitations and leg swelling.   Gastrointestinal: Negative for abdominal distention, abdominal pain, anal bleeding and diarrhea.   Genitourinary: Negative for dysuria, frequency, hematuria, menstrual problem, urgency and vaginal bleeding.   Musculoskeletal: Positive for arthralgias. Negative for back pain and gait problem.   Skin: Negative for color change, pallor and rash.   Neurological: Positive for weakness. Negative for tremors, seizures, numbness and headaches.   Psychiatric/Behavioral: Negative for agitation, behavioral problems and confusion.   All other systems reviewed and are negative.      Objective     Vital Signs  Temp:  [96.3 °F (35.7 °C)-98.3 °F (36.8 °C)] 96.3 °F (35.7 °C)  Heart Rate:  [] 87  Resp:  [16-22]  16  BP: (115-176)/(58-90) 126/58    Physical Exam:  Physical Exam  Vitals signs reviewed.   Constitutional:       Appearance: She is obese. She is ill-appearing.   HENT:      Head: Normocephalic and atraumatic.   Eyes:      General: No scleral icterus.     Extraocular Movements: Extraocular movements intact.      Pupils: Pupils are equal, round, and reactive to light.   Neck:      Musculoskeletal: Normal range of motion and neck supple. No neck rigidity or muscular tenderness.   Cardiovascular:      Rate and Rhythm: Normal rate and regular rhythm.   Pulmonary:      Effort: Respiratory distress (minimal) present.      Breath sounds: No stridor. Rales present. No wheezing or rhonchi.   Abdominal:      General: Bowel sounds are normal. There is no distension.      Palpations: Abdomen is soft. There is no mass.   Musculoskeletal: Normal range of motion.         General: Swelling present. No tenderness.   Skin:     General: Skin is warm and dry.      Coloration: Skin is not jaundiced or pale.      Findings: No bruising or erythema.   Neurological:      General: No focal deficit present.      Mental Status: She is alert and oriented to person, place, and time. Mental status is at baseline.      Cranial Nerves: No cranial nerve deficit.      Sensory: No sensory deficit.      Motor: No weakness.      Coordination: Coordination normal.   Psychiatric:         Mood and Affect: Mood normal.         Behavior: Behavior normal.         Results Review:   Lab Results (last 24 hours)     Procedure Component Value Units Date/Time    POC Glucose Once [[id number]]  (Abnormal) Collected: 01/14/21 0715    Specimen: Blood Updated: 01/14/21 0744     Glucose 198 mg/dL      Comment: RN NotifiedOperator: [id number] MINERVA ANGELITAMeter ID: [id number]       Manual Differential [[id number]]  (Abnormal) Collected: 01/14/21 0549    Specimen: Blood Updated: 01/14/21 0726     Neutrophil % 61.0 %      Lymphocyte % 28.0 %      Monocyte % 6.0 %       "Eosinophil % 2.0 %      Bands %  1.0 %      Metamyelocyte % 1.0 %      Atypical Lymphocyte % 1.0 %      Neutrophils Absolute 3.79 10*3/mm3      Lymphocytes Absolute 1.71 10*3/mm3      Monocytes Absolute 0.37 10*3/mm3      Eosinophils Absolute 0.12 10*3/mm3      Smudge Cells 6.0 /100 WBC      Anisocytosis Slight/1+     Comment: Few polychromasia cells seen        WBC Morphology Normal     Platelet Estimate Adequate    Ferritin [[id number]]  (Normal) Collected: 01/14/21 0549    Specimen: Blood Updated: 01/14/21 0658     Ferritin 143.60 ng/mL     Narrative:      Results may be falsely decreased if patient taking Biotin.      Procalcitonin [[id number]]  (Normal) Collected: 01/14/21 0549    Specimen: Blood Updated: 01/14/21 0653     Procalcitonin 0.21 ng/mL     Narrative:      As a Marker for Sepsis (Non-Neonates):   1. <0.5 ng/mL represents a low risk of severe sepsis and/or septic shock.  1. >2 ng/mL represents a high risk of severe sepsis and/or septic shock.    As a Marker for Lower Respiratory Tract Infections that require antibiotic therapy:  PCT on Admission     Antibiotic Therapy             6-12 Hrs later  > 0.5                Strongly Recommended            >0.25 - <0.5         Recommended  0.1 - 0.25           Discouraged                   Remeasure/reassess PCT  <0.1                 Strongly Discouraged          Remeasure/reassess PCT      As 28 day mortality risk marker: \"Change in Procalcitonin Result\" (> 80 % or <=80 %) if Day 0 (or Day 1) and Day 4 values are available. Refer to http://[email or web address]-pct-calculator.[email or web address]/   Change in PCT <=80 %   A decrease of PCT levels below or equal to 80 % defines a positive change in PCT test result representing a higher risk for 28-day all-cause mortality of patients diagnosed with severe sepsis or septic shock.  Change in PCT > 80 %   A decrease of PCT levels of more than 80 % defines a negative change in PCT result representing a lower risk for 28-day all-cause mortality " of patients diagnosed with severe sepsis or septic shock.                Results may be falsely decreased if patient taking Biotin.     Lactate Dehydrogenase [[id number]]  (Abnormal) Collected: 01/14/21 0549    Specimen: Blood Updated: 01/14/21 0648      U/L      Comment: Specimen hemolyzed.  Results may be affected.       D-dimer, Quantitative [[id number]]  (Abnormal) Collected: 01/14/21 0549    Specimen: Blood Updated: 01/14/21 0648     D-Dimer, Quantitative 1,063 ng/mL (FEU)     Narrative:      Dimer values <500 ng/ml FEU are FDA approved as aid in diagnosis of deep venous thrombosis and pulmonary embolism.  This test should not be used in an exclusion strategy with pretest probability alone.    A recent guideline regarding diagnosis for pulmonary thromboembolism recommends an adjusted exclusion criterion of age x 10 ng/ml FEU for patients >50 years of age ([employer] Intern Med 2015; 163: 701-711).      CBC & Differential [[id number]]  (Abnormal) Collected: 01/14/21 0549    Specimen: Blood Updated: 01/14/21 0647    Narrative:      The following orders were created for panel order CBC & Differential.  Procedure                               Abnormality         Status                     ---------                               -----------         ------                     Scan Slide[[id number]]                                                                  CBC Auto Differential[[id number]]        Abnormal            Final result                 Please view results for these tests on the individual orders.    CK [[id number]]  (Normal) Collected: 01/14/21 0549    Specimen: Blood Updated: 01/14/21 0646     Creatine Kinase 34 U/L     Comprehensive Metabolic Panel [[id number]]  (Abnormal) Collected: 01/14/21 0549    Specimen: Blood Updated: 01/14/21 0646     Glucose 194 mg/dL      BUN 39 mg/dL      Creatinine 1.95 mg/dL      Sodium 138 mmol/L      Potassium 3.7 mmol/L      Comment: Slight hemolysis detected by analyzer.  Results may be affected.        Chloride 105 mmol/L      CO2 22.0 mmol/L      Calcium 7.9 mg/dL      Total Protein 6.8 g/dL      Albumin 2.50 g/dL      ALT (SGPT) 12 U/L      AST (SGOT) 24 U/L      Alkaline Phosphatase 124 U/L      Total Bilirubin 0.3 mg/dL      eGFR Non African Amer 26 mL/min/1.73      Globulin 4.3 gm/dL      A/G Ratio 0.6 g/dL      BUN/Creatinine Ratio 20.0     Anion Gap 11.0 mmol/L     Narrative:      GFR Normal >60  Chronic Kidney Disease <60  Kidney Failure <15      C-reactive Protein [[id number]]  (Abnormal) Collected: 01/14/21 0549    Specimen: Blood Updated: 01/14/21 0646     C-Reactive Protein 12.40 mg/dL     CBC Auto Differential [[id number]]  (Abnormal) Collected: 01/14/21 0549    Specimen: Blood Updated: 01/14/21 0640     WBC 6.12 10*3/mm3      RBC 3.68 10*6/mm3      Hemoglobin 10.0 g/dL      Hematocrit 32.0 %      MCV 87.0 fL      MCH 27.2 pg      MCHC 31.3 g/dL      RDW 15.0 %      RDW-SD 48.1 fl      MPV 10.2 fL      Platelets 217 10*3/mm3      Neutrophil % 56.3 %      Lymphocyte % 32.8 %      Monocyte % 7.5 %      Eosinophil % 2.3 %      Basophil % 0.3 %      Immature Grans % 0.8 %      Neutrophils, Absolute 3.44 10*3/mm3      Lymphocytes, Absolute 2.01 10*3/mm3      Monocytes, Absolute 0.46 10*3/mm3      Eosinophils, Absolute 0.14 10*3/mm3      Basophils, Absolute 0.02 10*3/mm3      Immature Grans, Absolute 0.05 10*3/mm3      nRBC 0.0 /100 WBC     COVID-19, [facility] IN-HOUSE, NP SWAB IN TRANSPORT MEDIA 8-10 HR TAT - Swab, Nasopharynx [[id number]]  (Abnormal) Collected: 01/13/21 2104    Specimen: Swab from Nasopharynx Updated: 01/14/21 0627     COVID19 Detected     Comment: Enhanced Precautions Requested         Narrative:      Testing performed by Real Time RT-PCR  This test has not been approved by the [facility] but is authorized under the Emergency Use Act (EUA)    Fact sheet for providers: https://www.fda.gov/media/317299/download    Fact sheet for patients:  https://www.fda.gov/media/200297/download        Extra Tubes [[id number]] Collected: 01/13/21 1340    Specimen: Blood, Venous Line Updated: 01/13/21 2000    Narrative:      The following orders were created for panel order Extra Tubes.  Procedure                               Abnormality         Status                     ---------                               -----------         ------                     Gold Top - [facility][[id number]]                                   Final result               Gray Top[[id number]]                                         Final result                 Please view results for these tests on the individual orders.    [place] Top [[id number]] Collected: 01/13/21 1554    Specimen: Blood Updated: 01/13/21 2000     Extra Tube Hold for add-ons.     Comment: Auto resulted.       POC Glucose Once [[id number]]  (Abnormal) Collected: 01/13/21 1920    Specimen: Blood Updated: 01/13/21 1945     Glucose 222 mg/dL      Comment: RN NotifiedOperator: [id number] CHRISTINA LOUMeter ID: [id number]       Blood Culture - Blood, Arm, Right [[id number]] Collected: 01/13/21 1827    Specimen: Blood from Arm, Right Updated: 01/13/21 1833    Blood Culture - Blood, Arm, Left [[id number]] Collected: 01/13/21 1720    Specimen: Blood from Arm, Left Updated: 01/13/21 1730    Comprehensive Metabolic Panel [[id number]]  (Abnormal) Collected: 01/13/21 1553    Specimen: Blood Updated: 01/13/21 1626     Glucose 230 mg/dL      BUN 38 mg/dL      Creatinine 1.86 mg/dL      Sodium 138 mmol/L      Potassium 4.0 mmol/L      Chloride 104 mmol/L      CO2 21.0 mmol/L      Calcium 8.6 mg/dL      Total Protein 7.6 g/dL      Albumin 2.90 g/dL      ALT (SGPT) 14 U/L      AST (SGOT) 28 U/L      Alkaline Phosphatase 141 U/L      Total Bilirubin 0.3 mg/dL      eGFR Non African Amer 28 mL/min/1.73      Globulin 4.7 gm/dL      A/G Ratio 0.6 g/dL      BUN/Creatinine Ratio 20.4     Anion Gap 13.0 mmol/L     Narrative:      GFR Normal >60  Chronic Kidney  Disease <60  Kidney Failure <15      CK [[id number]]  (Normal) Collected: 01/13/21 1553    Specimen: Blood Updated: 01/13/21 1626     Creatine Kinase 29 U/L     Magnesium [[id number]]  (Normal) Collected: 01/13/21 1553    Specimen: Blood Updated: 01/13/21 1626     Magnesium 1.6 mg/dL     Troponin [[id number]]  (Normal) Collected: 01/13/21 1553    Specimen: Blood Updated: 01/13/21 1626     Troponin T 0.016 ng/mL     Narrative:      Troponin T Reference Range:  <= 0.03 ng/mL-   Negative for AMI  >0.03 ng/mL-     Abnormal for myocardial necrosis.  Clinicians would have to utilize clinical acumen, EKG, Troponin and serial changes to determine if it is an Acute Myocardial Infarction or myocardial injury due to an underlying chronic condition.       Results may be falsely decreased if patient taking Biotin.      Protime-INR [[id number]]  (Normal) Collected: 01/13/21 1553    Specimen: Blood Updated: 01/13/21 1620     Protime 13.9 Seconds      INR 1.03    Narrative:      Therapeutic range for most indications is 2.0-3.0 INR,  or 2.5-3.5 for mechanical heart valves.    D-dimer, Quantitative [[id number]]  (Abnormal) Collected: 01/13/21 1553    Specimen: Blood Updated: 01/13/21 1620     D-Dimer, Quantitative 1,230 ng/mL (FEU)     Narrative:      Dimer values <500 ng/ml FEU are FDA approved as aid in diagnosis of deep venous thrombosis and pulmonary embolism.  This test should not be used in an exclusion strategy with pretest probability alone.    A recent guideline regarding diagnosis for pulmonary thromboembolism recommends an adjusted exclusion criterion of age x 10 ng/ml FEU for patients >50 years of age (Shanel Intern Med 2015; 163: 701-711).      Gold Top - SST [[id number]] Collected: 01/13/21 1340    Specimen: Blood Updated: 01/13/21 1602     Extra Tube Hold for add-ons.     Comment: Auto resulted.       CBC & Differential [[id number]]  (Abnormal) Collected: 01/13/21 1553    Specimen: Blood Updated: 01/13/21 1558    Narrative:         The following orders were created for panel order CBC & Differential.  Procedure                               Abnormality         Status                     ---------                               -----------         ------                     CBC Auto Differential[[id number]]        Abnormal            Final result                 Please view results for these tests on the individual orders.    CBC Auto Differential [[id number]]  (Abnormal) Collected: 01/13/21 1553    Specimen: Blood Updated: 01/13/21 1558     WBC 5.93 10*3/mm3      RBC 4.23 10*6/mm3      Hemoglobin 11.7 g/dL      Hematocrit 36.4 %      MCV 86.1 fL      MCH 27.7 pg      MCHC 32.1 g/dL      RDW 15.0 %      RDW-SD 46.8 fl      MPV 9.7 fL      Platelets 208 10*3/mm3      Neutrophil % 65.2 %      Lymphocyte % 26.6 %      Monocyte % 6.9 %      Eosinophil % 0.3 %      Basophil % 0.3 %      Immature Grans % 0.7 %      Neutrophils, Absolute 3.86 10*3/mm3      Lymphocytes, Absolute 1.58 10*3/mm3      Monocytes, Absolute 0.41 10*3/mm3      Eosinophils, Absolute 0.02 10*3/mm3      Basophils, Absolute 0.02 10*3/mm3      Immature Grans, Absolute 0.04 10*3/mm3      nRBC 0.0 /100 WBC           Imaging Results (Last 24 Hours)     Procedure Component Value Units Date/Time    XR Chest 1 View [[id number]] Collected: 01/13/21 1606     Updated: 01/13/21 1625    Narrative:      PROCEDURE: Portable chest x-ray    TECHNIQUE: Single frontal view of the chest    COMPARISON: 10/5/2020. 9/10/2018    HISTORY: SOA    FINDINGS:  Sternal suture wire appearance is unchanged since 2018. There are  interstitial opacifications appearance in the perihilar regions  which could be due to interstitial edema. The cardiac silhouette  is enlarged.      Impression:      There are interstitial opacifications appearance in the perihilar  regions which could be due to interstitial edema.    Electronically signed by:  Umesh Parra MD  1/13/2021 4:24 PM [facility]  Workstation: [id number]           Assessment/Plan       Pneumonia due to COVID-19 virus    CKD (chronic kidney disease) stage 4, GFR 15-29 ml/min (CMS/HCC)    Diabetes mellitus (CMS/HCC)    Morbid obesity (CMS/HCC)    Repeat Covid screen test / start IV Remdesivir, PRN Proventil, provide her with supplemental Oxygen, IV Decadron if she becomes hypoxic. Monitor the renal function as the admission creatinine / GFR are at 1.86 / 28 mL/min, only slightly worse than her baseline.    Alvarez Escalona MD  01/14/21  08:04 CST

## 2021-01-13 NOTE — ED NOTES
Patient ambulated around the room. Patient maintained O2 sat of 94% on room air.      Loly Montoya RN  01/13/21 5217    
Report called to Mechelle RN on [address].      Loly Montoya RN  01/13/21 9452    
Telebox placed on patient and confirmed that it is working. Box # 1688.      Loly Montoya RN  01/13/21 0486    
English

## 2021-01-13 NOTE — ED PROVIDER NOTES
Subjective   Diagnosed with COVID 8 days ago.  Patient has multiple family members, including her  who also have COVID-19 at home.  He presents emergency department with progressive shortness of breath and fatigue.      Cough  Cough characteristics:  Unable to specify  Severity:  Moderate  Onset quality:  Gradual  Duration:  1 week  Timing:  Constant  Progression:  Worsening  Smoker: yes    Relieved by:  Nothing  Worsened by:  Activity  Associated symptoms: chills, fever and shortness of breath    Associated symptoms: no chest pain, no diaphoresis, no ear pain, no eye discharge, no headaches, no myalgias, no rash, no rhinorrhea, no sore throat and no wheezing    Shortness of Breath  Associated symptoms: cough, fever and vomiting    Associated symptoms: no abdominal pain, no chest pain, no diaphoresis, no ear pain, no headaches, no neck pain, no rash, no sore throat and no wheezing        Review of Systems   Constitutional: Positive for activity change, chills, fatigue and fever. Negative for appetite change and diaphoresis.   HENT: Negative for congestion, ear discharge, ear pain, nosebleeds, rhinorrhea, sinus pressure, sore throat and trouble swallowing.    Eyes: Negative for discharge and redness.   Respiratory: Positive for cough and shortness of breath. Negative for apnea, chest tightness and wheezing.    Cardiovascular: Negative for chest pain.   Gastrointestinal: Positive for diarrhea, nausea and vomiting. Negative for abdominal pain.   Endocrine: Negative for polyuria.   Genitourinary: Negative for difficulty urinating, dysuria, frequency and urgency.   Musculoskeletal: Negative for myalgias and neck pain.   Skin: Negative for color change and rash.   Allergic/Immunologic: Negative for immunocompromised state.   Neurological: Positive for weakness. Negative for dizziness, seizures, syncope, light-headedness and headaches.   Hematological: Negative for adenopathy. Does not bruise/bleed easily.    Psychiatric/Behavioral: Negative for behavioral problems and confusion.   All other systems reviewed and are negative.      Past Medical History:   Diagnosis Date   • Anxiety    • Carotid artery stenosis    • Chronic obstructive lung disease (CMS/[facility])    • CKD (chronic kidney disease) stage 4, GFR 15-29 ml/min (CMS/[facility])    • Colonic polyp    • Coronary arteriosclerosis    • Diabetes mellitus (CMS/HCC)    • Diabetic neuropathy (CMS/[facility])    • GERD (gastroesophageal reflux disease)    • Hypercholesterolemia    • Hypertension    • Morbid obesity (CMS/[facility])    • Nephrolithiasis    • Peripheral vascular disease (CMS/[facility])    • Sleep apnea    • Substance abuse (CMS/[facility])    • Vitamin D deficiency        Allergies   Allergen Reactions   • Adhesive Tape Hives   • Other      Bandaids, MRSA, SURECLOSE       Past Surgical History:   Procedure Laterality Date   • CARDIAC CATHETERIZATION N/A 7/14/2020   • CAROTID STENT Left    • COLONOSCOPY     • CORONARY ARTERY BYPASS GRAFT     • CYSTOSCOPY BLADDER STONE LITHOTRIPSY Bilateral        Family History   Problem Relation Age of Onset   • Heart disease Mother    • Heart disease Father    • Heart disease Sister    • Heart disease Brother        Social History     Socioeconomic History   • Marital status:      Spouse name: wesley dumont   • Number of children: Not on file   • Years of education: Not on file   • Highest education level: Not on file   Tobacco Use   • Smoking status: Current Every Day Smoker     Packs/day: 0.25     Years: 46.00     Pack years: 11.50     Types: Cigarettes   • Smokeless tobacco: Never Used   Substance and Sexual Activity   • Alcohol use: No   • Drug use: Yes     Types: LSD, Marijuana, Methamphetamines   • Sexual activity: Not Currently           Objective   Physical Exam  Vitals signs and nursing note reviewed.   Constitutional:       Appearance: She is well-developed.   HENT:      Head: Normocephalic and atraumatic.      Nose: Nose normal.   Eyes:       General: No scleral icterus.        Right eye: No discharge.         Left eye: No discharge.      Conjunctiva/sclera: Conjunctivae normal.      Pupils: Pupils are equal, round, and reactive to light.   Neck:      Musculoskeletal: Normal range of motion and neck supple.      Trachea: No tracheal deviation.   Cardiovascular:      Rate and Rhythm: Normal rate and regular rhythm.      Heart sounds: Normal heart sounds. No murmur.   Pulmonary:      Effort: Pulmonary effort is normal. No respiratory distress.      Breath sounds: No stridor. Decreased breath sounds and rhonchi present. No wheezing or rales.   Abdominal:      General: Bowel sounds are normal. There is no distension.      Palpations: Abdomen is soft. There is no mass.      Tenderness: There is no abdominal tenderness. There is no guarding or rebound.   Skin:     General: Skin is warm and dry.      Findings: No erythema or rash.   Neurological:      Mental Status: She is alert and oriented to person, place, and time.      Coordination: Coordination normal.   Psychiatric:         Behavior: Behavior normal.         Thought Content: Thought content normal.         ECG 12 Lead      Date/Time: 1/13/2021 5:02 PM  Performed by: Russell Mccray MD  Authorized by: Russell Mccray MD   Interpreted by physician  Rhythm: sinus tachycardia  Rate: tachycardic  BPM: 102  ST Segments: ST segments normal                   ED Course              Labs Reviewed   COMPREHENSIVE METABOLIC PANEL - Abnormal; Notable for the following components:       Result Value    Glucose 230 (*)     BUN 38 (*)     Creatinine 1.86 (*)     CO2 21.0 (*)     Albumin 2.90 (*)     Alkaline Phosphatase 141 (*)     eGFR Non  Amer 28 (*)     All other components within normal limits    Narrative:     GFR Normal >60  Chronic Kidney Disease <60  Kidney Failure <15     D-DIMER, QUANTITATIVE - Abnormal; Notable for the following components:    D-Dimer, Quantitative 1,230 (*)     All other  components within normal limits    Narrative:     Dimer values <500 ng/ml FEU are FDA approved as aid in diagnosis of deep venous thrombosis and pulmonary embolism.  This test should not be used in an exclusion strategy with pretest probability alone.    A recent guideline regarding diagnosis for pulmonary thromboembolism recommends an adjusted exclusion criterion of age x 10 ng/ml FEU for patients >50 years of age ([employer] Intern Med 2015; 163: 701-711).     CBC WITH AUTO DIFFERENTIAL - Abnormal; Notable for the following components:    Hemoglobin 11.7 (*)     Immature Grans % 0.7 (*)     All other components within normal limits   PROTIME-INR - Normal    Narrative:     Therapeutic range for most indications is 2.0-3.0 INR,  or 2.5-3.5 for mechanical heart valves.   CK - Normal   MAGNESIUM - Normal   TROPONIN (IN-HOUSE) - Normal    Narrative:     Troponin T Reference Range:  <= 0.03 ng/mL-   Negative for AMI  >0.03 ng/mL-     Abnormal for myocardial necrosis.  Clinicians would have to utilize clinical acumen, EKG, Troponin and serial changes to determine if it is an Acute Myocardial Infarction or myocardial injury due to an underlying chronic condition.       Results may be falsely decreased if patient taking Biotin.     BLOOD CULTURE   BLOOD CULTURE   BLOOD CULTURE   PROCALCITONIN   CBC AND DIFFERENTIAL    Narrative:     The following orders were created for panel order CBC & Differential.  Procedure                               Abnormality         Status                     ---------                               -----------         ------                     CBC Auto Differential[[id number]]        Abnormal            Final result                 Please view results for these tests on the individual orders.   GOLD TOP - SST   EXTRA TUBES    Narrative:     The following orders were created for panel order Extra Tubes.  Procedure                               Abnormality         Status                     ---------                                -----------         ------                     Gold Top - SST[[id number]]                                   Final result               Gray Top[[id number]]                                         In process                   Please view results for these tests on the individual orders.   GRAY TOP       XR Chest 1 View   Final Result   There are interstitial opacifications appearance in the perihilar   regions which could be due to interstitial edema.      Electronically signed by:  Umesh Parra MD  1/13/2021 4:24 PM CST   Workstation: [id number]      CT Angiogram Chest    (Results Pending)                                         MDM    Final diagnoses:   Pneumonia due to COVID-19 virus   Dyspnea, unspecified type   Chronic kidney disease, unspecified CKD stage            Russell Mccray MD  01/13/21 0593

## 2021-01-13 NOTE — OUTREACH NOTE
Medical Week 3 Survey      Responses   [facility] patient discharged from?  [place]   Does the patient have one of the following disease processes/diagnoses(primary or secondary)?  Other   Week 3 attempt successful?  No   Unsuccessful attempts  Attempt 2 [Pt in ED]          Hilda Sanchez LPN

## 2021-01-14 LAB
ALBUMIN SERPL-MCNC: 2.5 G/DL (ref 3.5–5.2)
ALBUMIN/GLOB SERPL: 0.6 G/DL
ALP SERPL-CCNC: 124 U/L (ref 39–117)
ALT SERPL W P-5'-P-CCNC: 12 U/L (ref 1–33)
ANION GAP SERPL CALCULATED.3IONS-SCNC: 11 MMOL/L (ref 5–15)
ANISOCYTOSIS BLD QL: ABNORMAL
AST SERPL-CCNC: 24 U/L (ref 1–32)
BASOPHILS # BLD AUTO: 0.02 10*3/MM3 (ref 0–0.2)
BASOPHILS NFR BLD AUTO: 0.3 % (ref 0–1.5)
BILIRUB SERPL-MCNC: 0.3 MG/DL (ref 0–1.2)
BUN SERPL-MCNC: 39 MG/DL (ref 8–23)
BUN/CREAT SERPL: 20 (ref 7–25)
CALCIUM SPEC-SCNC: 7.9 MG/DL (ref 8.6–10.5)
CHLORIDE SERPL-SCNC: 105 MMOL/L (ref 98–107)
CK SERPL-CCNC: 34 U/L (ref 20–180)
CO2 SERPL-SCNC: 22 MMOL/L (ref 22–29)
CREAT SERPL-MCNC: 1.95 MG/DL (ref 0.57–1)
CRP SERPL-MCNC: 12.4 MG/DL (ref 0–0.5)
D-DIMER, QUANTITATIVE (MAD,POW, STR): 1063 NG/ML (FEU) (ref 0–470)
DEPRECATED RDW RBC AUTO: 48.1 FL (ref 37–54)
EOSINOPHIL # BLD AUTO: 0.14 10*3/MM3 (ref 0–0.4)
EOSINOPHIL # BLD MANUAL: 0.12 10*3/MM3 (ref 0–0.4)
EOSINOPHIL NFR BLD AUTO: 2.3 % (ref 0.3–6.2)
EOSINOPHIL NFR BLD MANUAL: 2 % (ref 0.3–6.2)
ERYTHROCYTE [DISTWIDTH] IN BLOOD BY AUTOMATED COUNT: 15 % (ref 12.3–15.4)
FERRITIN SERPL-MCNC: 143.6 NG/ML (ref 13–150)
GFR SERPL CREATININE-BSD FRML MDRD: 26 ML/MIN/1.73
GLOBULIN UR ELPH-MCNC: 4.3 GM/DL
GLUCOSE BLDC GLUCOMTR-MCNC: 198 MG/DL (ref 70–130)
GLUCOSE BLDC GLUCOMTR-MCNC: 245 MG/DL (ref 70–130)
GLUCOSE BLDC GLUCOMTR-MCNC: 276 MG/DL (ref 70–130)
GLUCOSE BLDC GLUCOMTR-MCNC: 301 MG/DL (ref 70–130)
GLUCOSE SERPL-MCNC: 194 MG/DL (ref 65–99)
HCT VFR BLD AUTO: 32 % (ref 34–46.6)
HGB BLD-MCNC: 10 G/DL (ref 12–15.9)
IMM GRANULOCYTES # BLD AUTO: 0.05 10*3/MM3 (ref 0–0.05)
IMM GRANULOCYTES NFR BLD AUTO: 0.8 % (ref 0–0.5)
LDH SERPL-CCNC: 301 U/L (ref 135–214)
LYMPHOCYTES # BLD AUTO: 2.01 10*3/MM3 (ref 0.7–3.1)
LYMPHOCYTES # BLD MANUAL: 1.71 10*3/MM3 (ref 0.7–3.1)
LYMPHOCYTES NFR BLD AUTO: 32.8 % (ref 19.6–45.3)
LYMPHOCYTES NFR BLD MANUAL: 28 % (ref 19.6–45.3)
LYMPHOCYTES NFR BLD MANUAL: 6 % (ref 5–12)
MCH RBC QN AUTO: 27.2 PG (ref 26.6–33)
MCHC RBC AUTO-ENTMCNC: 31.3 G/DL (ref 31.5–35.7)
MCV RBC AUTO: 87 FL (ref 79–97)
METAMYELOCYTES NFR BLD MANUAL: 1 % (ref 0–0)
MONOCYTES # BLD AUTO: 0.37 10*3/MM3 (ref 0.1–0.9)
MONOCYTES # BLD AUTO: 0.46 10*3/MM3 (ref 0.1–0.9)
MONOCYTES NFR BLD AUTO: 7.5 % (ref 5–12)
NEUTROPHILS # BLD AUTO: 3.79 10*3/MM3 (ref 1.7–7)
NEUTROPHILS NFR BLD AUTO: 3.44 10*3/MM3 (ref 1.7–7)
NEUTROPHILS NFR BLD AUTO: 56.3 % (ref 42.7–76)
NEUTROPHILS NFR BLD MANUAL: 61 % (ref 42.7–76)
NEUTS BAND NFR BLD MANUAL: 1 % (ref 0–5)
NRBC BLD AUTO-RTO: 0 /100 WBC (ref 0–0.2)
PLATELET # BLD AUTO: 217 10*3/MM3 (ref 140–450)
PMV BLD AUTO: 10.2 FL (ref 6–12)
POTASSIUM SERPL-SCNC: 3.7 MMOL/L (ref 3.5–5.2)
PROCALCITONIN SERPL-MCNC: 0.21 NG/ML (ref 0–0.25)
PROT SERPL-MCNC: 6.8 G/DL (ref 6–8.5)
RBC # BLD AUTO: 3.68 10*6/MM3 (ref 3.77–5.28)
SARS-COV-2 RNA RESP QL NAA+PROBE: DETECTED
SMALL PLATELETS BLD QL SMEAR: ADEQUATE
SMUDGE CELLS IN BLOOD BY LIGHT MICROSCOPY: 6 /100 WBC
SODIUM SERPL-SCNC: 138 MMOL/L (ref 136–145)
VARIANT LYMPHS NFR BLD MANUAL: 1 % (ref 0–5)
WBC # BLD AUTO: 6.12 10*3/MM3 (ref 3.4–10.8)
WBC MORPH BLD: NORMAL

## 2021-01-14 PROCEDURE — G0378 HOSPITAL OBSERVATION PER HR: HCPCS

## 2021-01-14 PROCEDURE — 94640 AIRWAY INHALATION TREATMENT: CPT

## 2021-01-14 PROCEDURE — 82550 ASSAY OF CK (CPK): CPT | Performed by: INTERNAL MEDICINE

## 2021-01-14 PROCEDURE — 85025 COMPLETE CBC W/AUTO DIFF WBC: CPT | Performed by: INTERNAL MEDICINE

## 2021-01-14 PROCEDURE — 84145 PROCALCITONIN (PCT): CPT | Performed by: INTERNAL MEDICINE

## 2021-01-14 PROCEDURE — 82728 ASSAY OF FERRITIN: CPT | Performed by: INTERNAL MEDICINE

## 2021-01-14 PROCEDURE — 96372 THER/PROPH/DIAG INJ SC/IM: CPT

## 2021-01-14 PROCEDURE — 25010000002 ENOXAPARIN PER 10 MG: Performed by: INTERNAL MEDICINE

## 2021-01-14 PROCEDURE — 63710000001 INSULIN DETEMIR PER 5 UNITS: Performed by: INTERNAL MEDICINE

## 2021-01-14 PROCEDURE — 85007 BL SMEAR W/DIFF WBC COUNT: CPT | Performed by: INTERNAL MEDICINE

## 2021-01-14 PROCEDURE — 96365 THER/PROPH/DIAG IV INF INIT: CPT

## 2021-01-14 PROCEDURE — 94799 UNLISTED PULMONARY SVC/PX: CPT

## 2021-01-14 PROCEDURE — 80053 COMPREHEN METABOLIC PANEL: CPT | Performed by: INTERNAL MEDICINE

## 2021-01-14 PROCEDURE — 96361 HYDRATE IV INFUSION ADD-ON: CPT

## 2021-01-14 PROCEDURE — 86140 C-REACTIVE PROTEIN: CPT | Performed by: INTERNAL MEDICINE

## 2021-01-14 PROCEDURE — 83615 LACTATE (LD) (LDH) ENZYME: CPT | Performed by: INTERNAL MEDICINE

## 2021-01-14 PROCEDURE — 82962 GLUCOSE BLOOD TEST: CPT

## 2021-01-14 PROCEDURE — 85379 FIBRIN DEGRADATION QUANT: CPT | Performed by: INTERNAL MEDICINE

## 2021-01-14 PROCEDURE — 63710000001 INSULIN ASPART PER 5 UNITS: Performed by: INTERNAL MEDICINE

## 2021-01-14 RX ORDER — HYDROCODONE BITARTRATE AND ACETAMINOPHEN 5; 325 MG/1; MG/1
1 TABLET ORAL EVERY 8 HOURS PRN
Status: DISCONTINUED | OUTPATIENT
Start: 2021-01-14 | End: 2021-01-16 | Stop reason: HOSPADM

## 2021-01-14 RX ADMIN — OXYBUTYNIN CHLORIDE 5 MG: 5 TABLET, EXTENDED RELEASE ORAL at 08:03

## 2021-01-14 RX ADMIN — ALBUTEROL SULFATE 2 PUFF: 90 AEROSOL, METERED RESPIRATORY (INHALATION) at 07:03

## 2021-01-14 RX ADMIN — GABAPENTIN 300 MG: 300 CAPSULE ORAL at 08:03

## 2021-01-14 RX ADMIN — INSULIN DETEMIR 50 UNITS: 100 INJECTION, SOLUTION SUBCUTANEOUS at 21:00

## 2021-01-14 RX ADMIN — NYSTATIN: 100000 POWDER TOPICAL at 08:03

## 2021-01-14 RX ADMIN — METOPROLOL TARTRATE 50 MG: 50 TABLET, FILM COATED ORAL at 08:03

## 2021-01-14 RX ADMIN — CLOPIDOGREL BISULFATE 75 MG: 75 TABLET ORAL at 08:03

## 2021-01-14 RX ADMIN — GABAPENTIN 300 MG: 300 CAPSULE ORAL at 20:59

## 2021-01-14 RX ADMIN — INSULIN ASPART 3 UNITS: 100 INJECTION, SOLUTION INTRAVENOUS; SUBCUTANEOUS at 08:03

## 2021-01-14 RX ADMIN — ENOXAPARIN SODIUM 30 MG: 30 INJECTION SUBCUTANEOUS at 18:15

## 2021-01-14 RX ADMIN — REMDESIVIR 200 MG: 100 INJECTION, POWDER, LYOPHILIZED, FOR SOLUTION INTRAVENOUS at 09:07

## 2021-01-14 RX ADMIN — INSULIN ASPART 8 UNITS: 100 INJECTION, SOLUTION INTRAVENOUS; SUBCUTANEOUS at 18:15

## 2021-01-14 RX ADMIN — NYSTATIN: 100000 POWDER TOPICAL at 20:59

## 2021-01-14 RX ADMIN — INSULIN ASPART 10 UNITS: 100 INJECTION, SOLUTION INTRAVENOUS; SUBCUTANEOUS at 11:46

## 2021-01-14 RX ADMIN — MONTELUKAST SODIUM 10 MG: 10 TABLET, FILM COATED ORAL at 20:58

## 2021-01-14 RX ADMIN — ALBUTEROL SULFATE 2 PUFF: 90 AEROSOL, METERED RESPIRATORY (INHALATION) at 12:22

## 2021-01-14 RX ADMIN — SODIUM BICARBONATE 325 MG: 650 TABLET ORAL at 20:59

## 2021-01-14 RX ADMIN — ENOXAPARIN SODIUM 30 MG: 30 INJECTION SUBCUTANEOUS at 08:03

## 2021-01-14 RX ADMIN — METOPROLOL TARTRATE 50 MG: 50 TABLET, FILM COATED ORAL at 21:06

## 2021-01-14 RX ADMIN — PANTOPRAZOLE SODIUM 40 MG: 40 TABLET, DELAYED RELEASE ORAL at 20:59

## 2021-01-14 RX ADMIN — ISOSORBIDE MONONITRATE 30 MG: 30 TABLET, EXTENDED RELEASE ORAL at 08:03

## 2021-01-14 RX ADMIN — ALBUTEROL SULFATE 2 PUFF: 90 AEROSOL, METERED RESPIRATORY (INHALATION) at 22:05

## 2021-01-14 RX ADMIN — HYDROCODONE BITARTRATE AND ACETAMINOPHEN 1 TABLET: 5; 325 TABLET ORAL at 16:09

## 2021-01-14 RX ADMIN — SODIUM BICARBONATE 325 MG: 650 TABLET ORAL at 08:03

## 2021-01-14 NOTE — PLAN OF CARE
Goal Outcome Evaluation:  Plan of Care Reviewed With: patient  Progress: improving  Outcome Summary: VSS, denies pain, resting between care, started remdesivir, continue to monitor.

## 2021-01-14 NOTE — PROGRESS NOTES
[facility]  INPATIENT PROGRESS NOTE    Length of Stay: 0  Date of Admission: 1/13/2021  Primary Care Physician: Nirmal Calvillo MD    Subjective   Chief Complaint: weakness, nausea, vomiting   HPI:  60 year old  female with past medical history of COPD, CKD, CAD, type 2 DM, HTN, HLD, sleep apnea who presented on 1/13/2021 with cough, weakness, fever after testing positive for COVID 19.  During today's visit, she reports nausea and vomiting overnight and poor appetite this morning.  She reports fatigue continues as well.     Review of Systems   Constitutional: Positive for activity change, appetite change and fatigue. Negative for chills and fever.   HENT: Negative for congestion, rhinorrhea and sore throat.    Respiratory: Positive for cough. Negative for chest tightness, shortness of breath and wheezing.    Cardiovascular: Negative for chest pain, palpitations and leg swelling.   Gastrointestinal: Positive for diarrhea, nausea and vomiting. Negative for abdominal pain.   Musculoskeletal: Positive for back pain. Negative for neck pain.   Skin: Negative for pallor.   Neurological: Positive for weakness. Negative for dizziness and light-headedness.   Psychiatric/Behavioral: Negative for confusion. The patient is not nervous/anxious.         All pertinent negatives and positives are as above. All other systems have been reviewed and are negative unless otherwise stated.     Objective    Temp:  [96.3 °F (35.7 °C)-98.3 °F (36.8 °C)] 96.7 °F (35.9 °C)  Heart Rate:  [] 76  Resp:  [16-22] 20  BP: (115-176)/(58-89) 147/69    Physical Exam  Vitals signs and nursing note reviewed.   Constitutional:       General: She is not in acute distress.     Appearance: Normal appearance.   HENT:      Head: Normocephalic and atraumatic.      Right Ear: External ear normal.      Left Ear: External ear normal.      Nose: Nose normal.      Mouth/Throat:       Mouth: Mucous membranes are moist.      Pharynx: Oropharynx is clear.   Eyes:      General: No scleral icterus.        Right eye: No discharge.         Left eye: No discharge.      Extraocular Movements: Extraocular movements intact.      Conjunctiva/sclera: Conjunctivae normal.   Neck:      Musculoskeletal: Normal range of motion and neck supple.   Cardiovascular:      Rate and Rhythm: Normal rate and regular rhythm.      Pulses: Normal pulses.      Heart sounds: Normal heart sounds. No murmur. No friction rub. No gallop.    Neurological:      Mental Status: She is alert.             Results Review:  I have reviewed the labs, radiology results, and diagnostic studies.    Laboratory Data:   Results from last 7 days   Lab Units 01/14/21  0549 01/13/21  1553   SODIUM mmol/L 138 138   POTASSIUM mmol/L 3.7 4.0   CHLORIDE mmol/L 105 104   CO2 mmol/L 22.0 21.0*   BUN mg/dL 39* 38*   CREATININE mg/dL 1.95* 1.86*   GLUCOSE mg/dL 194* 230*   CALCIUM mg/dL 7.9* 8.6   BILIRUBIN mg/dL 0.3 0.3   ALK PHOS U/L 124* 141*   ALT (SGPT) U/L 12 14   AST (SGOT) U/L 24 28   ANION GAP mmol/L 11.0 13.0     Estimated Creatinine Clearance: 38.3 mL/min (A) (by C-G formula based on SCr of 1.95 mg/dL (H)).  Results from last 7 days   Lab Units 01/13/21  1553   MAGNESIUM mg/dL 1.6         Results from last 7 days   Lab Units 01/14/21  0549 01/13/21  1553   WBC 10*3/mm3 6.12 5.93   HEMOGLOBIN g/dL 10.0* 11.7*   HEMATOCRIT % 32.0* 36.4   PLATELETS 10*3/mm3 217 208     Results from last 7 days   Lab Units 01/13/21  1553   INR  1.03       Culture Data:   No results found for: BLOODCX  No results found for: URINECX  No results found for: RESPCX  No results found for: WOUNDCX  No results found for: STOOLCX  No components found for: BODYFLD    Radiology Data:   Imaging Results (Last 24 Hours)     Procedure Component Value Units Date/Time    XR Chest 1 View [[id number]] Collected: 01/13/21 1606     Updated: 01/13/21 1625    Narrative:      PROCEDURE:  Portable chest x-ray    TECHNIQUE: Single frontal view of the chest    COMPARISON: 10/5/2020. 9/10/2018    HISTORY: SOA    FINDINGS:  Sternal suture wire appearance is unchanged since 2018. There are  interstitial opacifications appearance in the perihilar regions  which could be due to interstitial edema. The cardiac silhouette  is enlarged.      Impression:      There are interstitial opacifications appearance in the perihilar  regions which could be due to interstitial edema.    Electronically signed by:  Umesh Parra MD  1/13/2021 4:24 PM CST  Workstation: [id number]          I have reviewed the patient's current medications.     Assessment/Plan     Active Hospital Problems    Diagnosis   • **Pneumonia due to COVID-19 virus   • CKD (chronic kidney disease) stage 4, GFR 15-29 ml/min (CMS/HCC)   • Morbid obesity (CMS/[facility])   • Diabetes mellitus (CMS/[facility])                Plan:    1. COVID 19 viral pneumonia: continue o2 support, Albuterol, Lovenox for DVT prevention.  Continue Remdesivir (day 1).  Continue IV hydration and antiemetics for nausea/vomiting.   2. Type 2 DM: FSBS AC and HS with SSI, Levemir QHS.   3. CKD stage 4: daily BMP monitoring.    4. Morbid obesity with BMI of 50.54:  5. CAD: continue Plavix, Imdur, Lopressor.  6. HTN: continue Lopressor.     Discharge Planning: I expect patient to be discharged to home in 1-2 days.          This document has been electronically signed by BRIDGETT Joe on January 14, 2021 16:24 CST

## 2021-01-14 NOTE — PLAN OF CARE
Goal Outcome Evaluation:  Plan of Care Reviewed With: patient  Progress: no change  Outcome Summary: New admit

## 2021-01-14 NOTE — PROGRESS NOTES
Discharge Planning Assessment  [facility]     Patient Name: Yamileth Slater  MRN: [id number]  Today's Date: 1/14/2021    Admit Date: 1/13/2021    Discharge Needs Assessment     Row Name 01/14/21 1357       Living Environment    Lives With  spouse    Name(s) of Who Lives With Patient  Huy Slater (spouse)    Current Living Arrangements  home/apartment/condo Information on face sheet confirmed with patient.    Primary Care Provided by  self    Provides Primary Care For  no one    Family Caregiver if Needed  spouse    Family Caregiver Names  Huy Slater (spouse)    Quality of Family Relationships  helpful;involved    Able to Return to Prior Arrangements  yes       Resource/Environmental Concerns    Resource/Environmental Concerns  none    Transportation Concerns  car, none       Transition Planning    Patient/Family Anticipates Transition to  home with family    Patient/Family Anticipated Services at Transition  none    Transportation Anticipated  family or friend will provide Patient does not drive. Spouse or daughter will assist with transportation at d/c.       Discharge Needs Assessment    Readmission Within the Last 30 Days  current reason for admission unrelated to previous admission;other (see comments) Admission from 12-18 to 12-22 dx: pancreatitis    Equipment Currently Used at Home  nebulizer;glucometer;cane, straight;walker, rolling;other (see comments) Patient has access to a home concentrator machine and voiced that she wears oxygen, as needed.    Concerns to be Addressed  denies needs/concerns at this time    Concerns Comments  Patient voiced that she had a home oxygen concentrator supplied by [facility] until she no longer met criteria and the machine had to be returned. Patient voiced that there are times she feels it is needed.  She reports that she gets SOA at times. Patient proceeded to state that after the concentrator was returned to [facility], a friend located a home  "concentrator and gave her a unit to use PRN.    Anticipated Changes Related to Illness  none    Equipment Needed After Discharge  none    Provided Post Acute Provider List?  Yes    Post Acute Provider List  DME Supplier;Home Health    Delivered To  Patient    Method of Delivery  Telephone    Patient's Choice of Community Agency(s)  [facility] health and [facility] for home DME needs.    Current Discharge Risk  chronically ill        Discharge Plan     Row Name 01/14/21 0143       Plan    Plan  home with a transition visit    Plan Comments  LACE assessment complete. Patient is agreeable to a home health transition visit. Denies need for  skilled service. Plan is to return home pending medical stability. Continue with medical management....Katalina Trinh LSW    Row Name 01/14/21 1141       Plan    Plan Comments  Patient maintians on room air. She is still SOA. Will likely monitor and if no changes discharge in 24 hours..GRAZYNA PFEIFFER CM        Continued Care and Services - Admitted Since 1/13/2021    Coordination has not been started for this encounter.       Expected Discharge Date and Time     Expected Discharge Date Expected Discharge Time    Kyle 15, 2021         Demographic Summary     Row Name 01/14/21 1351       General Information    Admission Type  observation    Arrived From  emergency department    Referral Source  high risk screening LACE score 9    Preferred Language  English     Used During This Interaction  no       Contact Information    Contact Information Obtained for          Functional Status     Row Name 01/14/21 1353       Functional Status    Usual Activity Tolerance  moderate    Functional Status Comments  Patient ambulates short distances with the use of a walker. Patient states that if she exceeds \"20 steps\" her \"back and legs are on fire\". Patient reports this pain secondary to neuropathy. Patient's spouse assists patient with set up and bathing in areas that she is unable " to reach.       Functional Status, IADL    Medications  independent Pharmacy, [place], and prescription coverage confirmed.    Meal Preparation  assistive person    Housekeeping  assistive person    Laundry  assistive person    Shopping  assistive person    IADL Comments  Patient voiced that she is able to assist with performing homemaking services; however, she reports that her spouse performs most of them.       Mental Status Summary    Recent Changes in Mental Status/Cognitive Functioning  no changes       Employment/    Employment Status  disabled        Psychosocial    No documentation.       Abuse/Neglect    No documentation.       Legal    No documentation.       Substance Abuse    No documentation.       Patient Forms    No documentation.           JOHN Amos

## 2021-01-15 LAB
ALBUMIN SERPL-MCNC: 2.4 G/DL (ref 3.5–5.2)
ALP SERPL-CCNC: 124 U/L (ref 39–117)
ALT SERPL W P-5'-P-CCNC: 11 U/L (ref 1–33)
AST SERPL-CCNC: 16 U/L (ref 1–32)
BILIRUB CONJ SERPL-MCNC: <0.2 MG/DL (ref 0–0.3)
BILIRUB INDIRECT SERPL-MCNC: ABNORMAL MG/DL
BILIRUB SERPL-MCNC: <0.2 MG/DL (ref 0–1.2)
CREAT SERPL-MCNC: 2.12 MG/DL (ref 0.57–1)
GFR SERPL CREATININE-BSD FRML MDRD: 24 ML/MIN/1.73
GLUCOSE BLDC GLUCOMTR-MCNC: 178 MG/DL (ref 70–130)
GLUCOSE BLDC GLUCOMTR-MCNC: 234 MG/DL (ref 70–130)
GLUCOSE BLDC GLUCOMTR-MCNC: 310 MG/DL (ref 70–130)
GLUCOSE BLDC GLUCOMTR-MCNC: 326 MG/DL (ref 70–130)
PROT SERPL-MCNC: 6.5 G/DL (ref 6–8.5)

## 2021-01-15 PROCEDURE — 25010000002 ENOXAPARIN PER 10 MG: Performed by: INTERNAL MEDICINE

## 2021-01-15 PROCEDURE — G0378 HOSPITAL OBSERVATION PER HR: HCPCS

## 2021-01-15 PROCEDURE — 82962 GLUCOSE BLOOD TEST: CPT

## 2021-01-15 PROCEDURE — 96366 THER/PROPH/DIAG IV INF ADDON: CPT

## 2021-01-15 PROCEDURE — 94799 UNLISTED PULMONARY SVC/PX: CPT

## 2021-01-15 PROCEDURE — 96372 THER/PROPH/DIAG INJ SC/IM: CPT

## 2021-01-15 PROCEDURE — 80076 HEPATIC FUNCTION PANEL: CPT | Performed by: INTERNAL MEDICINE

## 2021-01-15 PROCEDURE — 96376 TX/PRO/DX INJ SAME DRUG ADON: CPT

## 2021-01-15 PROCEDURE — 25010000002 ONDANSETRON PER 1 MG: Performed by: INTERNAL MEDICINE

## 2021-01-15 PROCEDURE — 94760 N-INVAS EAR/PLS OXIMETRY 1: CPT

## 2021-01-15 PROCEDURE — 63710000001 INSULIN ASPART PER 5 UNITS: Performed by: INTERNAL MEDICINE

## 2021-01-15 PROCEDURE — 82565 ASSAY OF CREATININE: CPT | Performed by: INTERNAL MEDICINE

## 2021-01-15 PROCEDURE — 63710000001 INSULIN DETEMIR PER 5 UNITS: Performed by: INTERNAL MEDICINE

## 2021-01-15 RX ADMIN — ALBUTEROL SULFATE 2 PUFF: 90 AEROSOL, METERED RESPIRATORY (INHALATION) at 15:10

## 2021-01-15 RX ADMIN — INSULIN ASPART 5 UNITS: 100 INJECTION, SOLUTION INTRAVENOUS; SUBCUTANEOUS at 17:19

## 2021-01-15 RX ADMIN — NYSTATIN: 100000 POWDER TOPICAL at 08:47

## 2021-01-15 RX ADMIN — INSULIN ASPART 3 UNITS: 100 INJECTION, SOLUTION INTRAVENOUS; SUBCUTANEOUS at 08:48

## 2021-01-15 RX ADMIN — OXYBUTYNIN CHLORIDE 5 MG: 5 TABLET, EXTENDED RELEASE ORAL at 08:48

## 2021-01-15 RX ADMIN — GABAPENTIN 300 MG: 300 CAPSULE ORAL at 08:48

## 2021-01-15 RX ADMIN — INSULIN DETEMIR 50 UNITS: 100 INJECTION, SOLUTION SUBCUTANEOUS at 21:15

## 2021-01-15 RX ADMIN — REMDESIVIR 100 MG: 100 INJECTION, POWDER, LYOPHILIZED, FOR SOLUTION INTRAVENOUS at 08:47

## 2021-01-15 RX ADMIN — ISOSORBIDE MONONITRATE 30 MG: 30 TABLET, EXTENDED RELEASE ORAL at 08:48

## 2021-01-15 RX ADMIN — METOPROLOL TARTRATE 50 MG: 50 TABLET, FILM COATED ORAL at 21:14

## 2021-01-15 RX ADMIN — METOPROLOL TARTRATE 50 MG: 50 TABLET, FILM COATED ORAL at 08:48

## 2021-01-15 RX ADMIN — ALBUTEROL SULFATE 2 PUFF: 90 AEROSOL, METERED RESPIRATORY (INHALATION) at 19:40

## 2021-01-15 RX ADMIN — HYDROCODONE BITARTRATE AND ACETAMINOPHEN 1 TABLET: 5; 325 TABLET ORAL at 02:46

## 2021-01-15 RX ADMIN — ENOXAPARIN SODIUM 30 MG: 30 INJECTION SUBCUTANEOUS at 21:13

## 2021-01-15 RX ADMIN — CLOPIDOGREL BISULFATE 75 MG: 75 TABLET ORAL at 08:48

## 2021-01-15 RX ADMIN — SODIUM BICARBONATE 325 MG: 650 TABLET ORAL at 21:14

## 2021-01-15 RX ADMIN — ONDANSETRON 4 MG: 2 INJECTION INTRAMUSCULAR; INTRAVENOUS at 09:59

## 2021-01-15 RX ADMIN — ALBUTEROL SULFATE 2 PUFF: 90 AEROSOL, METERED RESPIRATORY (INHALATION) at 10:50

## 2021-01-15 RX ADMIN — NYSTATIN: 100000 POWDER TOPICAL at 21:21

## 2021-01-15 RX ADMIN — GABAPENTIN 300 MG: 300 CAPSULE ORAL at 21:14

## 2021-01-15 RX ADMIN — MONTELUKAST SODIUM 10 MG: 10 TABLET, FILM COATED ORAL at 21:14

## 2021-01-15 RX ADMIN — INSULIN ASPART 10 UNITS: 100 INJECTION, SOLUTION INTRAVENOUS; SUBCUTANEOUS at 12:02

## 2021-01-15 RX ADMIN — PANTOPRAZOLE SODIUM 40 MG: 40 TABLET, DELAYED RELEASE ORAL at 21:14

## 2021-01-15 RX ADMIN — HYDROCODONE BITARTRATE AND ACETAMINOPHEN 1 TABLET: 5; 325 TABLET ORAL at 15:59

## 2021-01-15 RX ADMIN — ENOXAPARIN SODIUM 30 MG: 30 INJECTION SUBCUTANEOUS at 08:47

## 2021-01-15 RX ADMIN — SODIUM BICARBONATE 325 MG: 650 TABLET ORAL at 08:48

## 2021-01-15 RX ADMIN — ALBUTEROL SULFATE 2 PUFF: 90 AEROSOL, METERED RESPIRATORY (INHALATION) at 07:46

## 2021-01-15 NOTE — PLAN OF CARE
Goal Outcome Evaluation:  Plan of Care Reviewed With: patient  Progress: improving  Outcome Summary: VSS; patient on room air; tolerating diet but occassional nausea no emesis; will continue to monitor

## 2021-01-15 NOTE — CONSULTS
Adult Nutrition  Assessment    Patient Name:  Yamileth Slater  YOB: 1960  MRN: [id number]  Admit Date:  1/13/2021    Assessment Date:  1/15/2021    Comments:  RD consult r/t MST score 3. Pt admit d/t Covid+. Reports poor intake pta, but improved appetite now. Nausea controlled w/antiemetics. Weight overall stable. Labs reviewed. RD will monitor course and make recs accordingly.     Reason for Assessment     Row Name 01/15/21 1017          Reason for Assessment    Reason For Assessment  identified at risk by screening criteria     Diagnosis  pulmonary disease     Identified At Risk by Screening Criteria  MST SCORE 2+         Nutrition/Diet History     Row Name 01/15/21 1058          Nutrition/Diet History    Typical Food/Fluid Intake  Pt says she was not eating well pta, but appetite is improved now. She has had n/v but is controlled w/antiemetics. Reports stable weight.     Factors Affecting Nutritional Intake  nausea         Anthropometrics     Row Name 01/15/21 0321          Anthropometrics    Weight  126 kg (278 lb)         Labs/Tests/Procedures/Meds     Row Name 01/15/21 1017          Labs/Procedures/Meds    Lab Results Reviewed  reviewed, pertinent     Lab Results Comments  Gl 178, Crea 2.12, BUN39, Alb 2.4        Diagnostic Tests/Procedures    Diagnostic Test/Procedure Reviewed  reviewed        Medications    Pertinent Medications Reviewed  reviewed           Estimated/Assessed Needs     Row Name 01/15/21 1018          Calculation Measurements    Weight Used For Calculations  63.5 kg (140 lb)        Estimated/Assessed Needs    Additional Documentation  KCAL/KG (Group);Protein Requirements (Group);Fluid Requirements (Group)        KCAL/KG    KCAL/KG  25 Kcal/Kg (kcal)     25 Kcal/Kg (kcal)  1587.6        Protein Requirements    Weight Used For Protein Calculations  63.5 kg (140 lb)        Fluid Requirements    Fluid Requirements (mL/day)  1600     RDA Method (mL)  1600         Nutrition  Prescription Ordered     Row Name 01/15/21 1019          Nutrition Prescription PO    Current PO Diet  Regular     Fluid Consistency  Thin     Common Modifiers  Consistent Carbohydrate         Evaluation of Received Nutrient/Fluid Intake     Row Name 01/15/21 1019          PO Evaluation    Number of Meals  5     % PO Intake  %               Electronically signed by:  Charlene Escalera RD  01/15/21 11:02 CST

## 2021-01-15 NOTE — PLAN OF CARE
Goal Outcome Evaluation:  Plan of Care Reviewed With: patient  Progress: improving  Outcome Summary: Pt initially reported poor intake d/t n/v but says those symptoms are much improved and so is po intake. Weight has been stable. RD will monitor.

## 2021-01-15 NOTE — PROGRESS NOTES
[facility] Medicine Services  INPATIENT PROGRESS NOTE    Length of Stay: 0  Date of Admission: 1/13/2021  Primary Care Physician: Nirmal Calvillo MD    Subjective   Chief Complaint: weakness, nausea, vomiting   HPI:  60 year old  female with past medical history of COPD, CKD, CAD, type 2 DM, HTN, HLD, sleep apnea who presented on 1/13/2021 with cough, weakness, fever after testing positive for COVID 19.  During today's visit, she reports nausea and diarrhea.  She denies any vomiting since yesterday and reports she was able to eat half of breakfast this morning.  She reports three diarrhea stools so far this morning.     Review of Systems   Constitutional: Positive for activity change, appetite change and fatigue. Negative for chills and fever.   HENT: Negative for congestion, rhinorrhea and sore throat.    Respiratory: Negative for cough, chest tightness, shortness of breath and wheezing.    Cardiovascular: Negative for chest pain, palpitations and leg swelling.   Gastrointestinal: Positive for diarrhea and nausea. Negative for abdominal pain and vomiting.   Musculoskeletal: Positive for back pain. Negative for neck pain.   Skin: Negative for pallor.   Neurological: Positive for weakness. Negative for dizziness and light-headedness.   Psychiatric/Behavioral: Negative for confusion. The patient is not nervous/anxious.         All pertinent negatives and positives are as above. All other systems have been reviewed and are negative unless otherwise stated.     Objective    Temp:  [96.3 °F (35.7 °C)-97.4 °F (36.3 °C)] 97.4 °F (36.3 °C)  Heart Rate:  [70-85] 71  Resp:  [16-22] 18  BP: (147-150)/(67-80) 150/80    Physical Exam  Vitals signs and nursing note reviewed.   Constitutional:       General: She is not in acute distress.     Appearance: Normal appearance.   HENT:      Head: Normocephalic and atraumatic.      Right Ear: External ear normal.      Left Ear:  External ear normal.      Nose: Nose normal.      Mouth/Throat:      Mouth: Mucous membranes are moist.      Pharynx: Oropharynx is clear.   Eyes:      General: No scleral icterus.        Right eye: No discharge.         Left eye: No discharge.      Extraocular Movements: Extraocular movements intact.      Conjunctiva/sclera: Conjunctivae normal.   Neck:      Musculoskeletal: Normal range of motion and neck supple.   Cardiovascular:      Rate and Rhythm: Normal rate and regular rhythm.      Pulses: Normal pulses.      Heart sounds: Normal heart sounds. No murmur. No friction rub. No gallop.    Neurological:      Mental Status: She is alert.             Results Review:  I have reviewed the labs, radiology results, and diagnostic studies.    Laboratory Data:   Results from last 7 days   Lab Units 01/15/21  0646 01/14/21  0549 01/13/21  1553   SODIUM mmol/L  --  138 138   POTASSIUM mmol/L  --  3.7 4.0   CHLORIDE mmol/L  --  105 104   CO2 mmol/L  --  22.0 21.0*   BUN mg/dL  --  39* 38*   CREATININE mg/dL 2.12* 1.95* 1.86*   GLUCOSE mg/dL  --  194* 230*   CALCIUM mg/dL  --  7.9* 8.6   BILIRUBIN mg/dL <0.2 0.3 0.3   ALK PHOS U/L 124* 124* 141*   ALT (SGPT) U/L 11 12 14   AST (SGOT) U/L 16 24 28   ANION GAP mmol/L  --  11.0 13.0     Estimated Creatinine Clearance: 35.4 mL/min (A) (by C-G formula based on SCr of 2.12 mg/dL (H)).  Results from last 7 days   Lab Units 01/13/21  1553   MAGNESIUM mg/dL 1.6         Results from last 7 days   Lab Units 01/14/21  0549 01/13/21  1553   WBC 10*3/mm3 6.12 5.93   HEMOGLOBIN g/dL 10.0* 11.7*   HEMATOCRIT % 32.0* 36.4   PLATELETS 10*3/mm3 217 208     Results from last 7 days   Lab Units 01/13/21  1553   INR  1.03       Culture Data:   Blood Culture   Date Value Ref Range Status   01/13/2021 No growth at 24 hours  Preliminary   01/13/2021 No growth at 24 hours  Preliminary     No results found for: URINECX  No results found for: RESPCX  No results found for: WOUNDCX  No results found  for: STOOLCX  No components found for: BODYFLD    Radiology Data:   Imaging Results (Last 24 Hours)     ** No results found for the last 24 hours. **          I have reviewed the patient's current medications.     Assessment/Plan     Active Hospital Problems    Diagnosis   • **Pneumonia due to COVID-19 virus   • CKD (chronic kidney disease) stage 4, GFR 15-29 ml/min (CMS/[facility])   • Morbid obesity (CMS/[facility])   • Diabetes mellitus (CMS/[facility])                Plan:    1. COVID 19 viral pneumonia: continue o2 support, Albuterol, Lovenox for DVT prevention.  Continue Remdesivir (day 2).  Continue IV hydration and antiemetics for nausea/vomiting.   2. Type 2 DM: FSBS AC and HS with SSI, Levemir QHS.   3. CKD stage 4: daily BMP monitoring.    4. Morbid obesity with BMI of 50.54:  5. CAD: continue Plavix, Imdur, Lopressor.  6. HTN: continue Lopressor.     Discharge Planning: continue IV hydration, antiemetics.  If creatinine stable and vomiting/diarrhea subside, patient may be stable for discharge in AM.  Home health transition visit ordered.           This document has been electronically signed by BRIDGETT Joe on January 15, 2021 13:43 CST

## 2021-01-15 NOTE — PLAN OF CARE
Goal Outcome Evaluation:  Plan of Care Reviewed With: patient  Progress: no change  Outcome Summary: vss, resting between care, on room air, no complaints at this time

## 2021-01-16 ENCOUNTER — READMISSION MANAGEMENT (OUTPATIENT)
Dept: CALL CENTER | Facility: HOSPITAL | Age: 61
End: 2021-01-16

## 2021-01-16 VITALS
WEIGHT: 276.8 LBS | TEMPERATURE: 96.8 F | OXYGEN SATURATION: 97 % | HEART RATE: 71 BPM | RESPIRATION RATE: 18 BRPM | SYSTOLIC BLOOD PRESSURE: 142 MMHG | DIASTOLIC BLOOD PRESSURE: 72 MMHG | BODY MASS INDEX: 50.94 KG/M2 | HEIGHT: 62 IN

## 2021-01-16 LAB
ALBUMIN SERPL-MCNC: 2.6 G/DL (ref 3.5–5.2)
ALP SERPL-CCNC: 138 U/L (ref 39–117)
ALT SERPL W P-5'-P-CCNC: 12 U/L (ref 1–33)
AST SERPL-CCNC: 19 U/L (ref 1–32)
BILIRUB CONJ SERPL-MCNC: <0.2 MG/DL (ref 0–0.3)
BILIRUB INDIRECT SERPL-MCNC: ABNORMAL MG/DL
BILIRUB SERPL-MCNC: <0.2 MG/DL (ref 0–1.2)
CREAT SERPL-MCNC: 1.97 MG/DL (ref 0.57–1)
GFR SERPL CREATININE-BSD FRML MDRD: 26 ML/MIN/1.73
PROT SERPL-MCNC: 6.8 G/DL (ref 6–8.5)
WHOLE BLOOD HOLD SPECIMEN: NORMAL

## 2021-01-16 PROCEDURE — 80076 HEPATIC FUNCTION PANEL: CPT | Performed by: INTERNAL MEDICINE

## 2021-01-16 PROCEDURE — 82565 ASSAY OF CREATININE: CPT | Performed by: INTERNAL MEDICINE

## 2021-01-16 PROCEDURE — 94799 UNLISTED PULMONARY SVC/PX: CPT

## 2021-01-16 PROCEDURE — 63710000001 INSULIN ASPART PER 5 UNITS: Performed by: INTERNAL MEDICINE

## 2021-01-16 PROCEDURE — 94760 N-INVAS EAR/PLS OXIMETRY 1: CPT

## 2021-01-16 PROCEDURE — G0378 HOSPITAL OBSERVATION PER HR: HCPCS

## 2021-01-16 PROCEDURE — 96372 THER/PROPH/DIAG INJ SC/IM: CPT

## 2021-01-16 PROCEDURE — 82962 GLUCOSE BLOOD TEST: CPT

## 2021-01-16 PROCEDURE — 25010000002 ENOXAPARIN PER 10 MG: Performed by: INTERNAL MEDICINE

## 2021-01-16 PROCEDURE — 96366 THER/PROPH/DIAG IV INF ADDON: CPT

## 2021-01-16 RX ORDER — SODIUM CHLORIDE 9 MG/ML
INJECTION, SOLUTION INTRAVENOUS
Status: COMPLETED
Start: 2021-01-16 | End: 2021-01-16

## 2021-01-16 RX ORDER — ONDANSETRON 4 MG/1
4 TABLET, ORALLY DISINTEGRATING ORAL EVERY 8 HOURS PRN
Qty: 30 TABLET | Refills: 0 | Status: SHIPPED | OUTPATIENT
Start: 2021-01-16 | End: 2021-03-26 | Stop reason: SDUPTHER

## 2021-01-16 RX ADMIN — OXYBUTYNIN CHLORIDE 5 MG: 5 TABLET, EXTENDED RELEASE ORAL at 08:56

## 2021-01-16 RX ADMIN — INSULIN ASPART 12 UNITS: 100 INJECTION, SOLUTION INTRAVENOUS; SUBCUTANEOUS at 11:26

## 2021-01-16 RX ADMIN — INSULIN ASPART 5 UNITS: 100 INJECTION, SOLUTION INTRAVENOUS; SUBCUTANEOUS at 08:57

## 2021-01-16 RX ADMIN — SODIUM CHLORIDE 500 ML: 9 INJECTION, SOLUTION INTRAVENOUS at 08:56

## 2021-01-16 RX ADMIN — ALBUTEROL SULFATE 2 PUFF: 90 AEROSOL, METERED RESPIRATORY (INHALATION) at 07:03

## 2021-01-16 RX ADMIN — ISOSORBIDE MONONITRATE 30 MG: 30 TABLET, EXTENDED RELEASE ORAL at 08:57

## 2021-01-16 RX ADMIN — ENOXAPARIN SODIUM 30 MG: 30 INJECTION SUBCUTANEOUS at 08:56

## 2021-01-16 RX ADMIN — REMDESIVIR 100 MG: 100 INJECTION, POWDER, LYOPHILIZED, FOR SOLUTION INTRAVENOUS at 08:55

## 2021-01-16 RX ADMIN — CLOPIDOGREL BISULFATE 75 MG: 75 TABLET ORAL at 08:56

## 2021-01-16 RX ADMIN — SODIUM BICARBONATE 325 MG: 650 TABLET ORAL at 08:56

## 2021-01-16 RX ADMIN — ALBUTEROL SULFATE 2 PUFF: 90 AEROSOL, METERED RESPIRATORY (INHALATION) at 10:28

## 2021-01-16 RX ADMIN — NYSTATIN: 100000 POWDER TOPICAL at 09:36

## 2021-01-16 RX ADMIN — GABAPENTIN 300 MG: 300 CAPSULE ORAL at 08:56

## 2021-01-16 RX ADMIN — METOPROLOL TARTRATE 50 MG: 50 TABLET, FILM COATED ORAL at 08:57

## 2021-01-16 NOTE — DISCHARGE SUMMARY
[facility] Medicine Services  DISCHARGE SUMMARY       Date of Admission: 1/13/2021  Date of Discharge:  1/16/2021  Primary Care Physician: Nirmal Calvillo MD    Presenting Problem/History of Present Illness:  Dyspnea, unspecified type [R06.00]  Chronic kidney disease, unspecified CKD stage [N18.9]  Pneumonia due to COVID-19 virus [U07.1, J12.82]       Final Discharge Diagnoses:  Active Hospital Problems    Diagnosis   • **Pneumonia due to COVID-19 virus   • CKD (chronic kidney disease) stage 4, GFR 15-29 ml/min (CMS/[facility])   • Morbid obesity (CMS/[facility])   • Diabetes mellitus (CMS/[facility])                Consults:   Consults     Date and Time Order Name Status Description    1/13/2021 1702 Hospitalist (on-call MD unless specified)            Procedures Performed:                 Pertinent Test Results:   Lab Results (last 24 hours)     Procedure Component Value Units Date/Time    Extra Tubes [[id number]] Collected: 01/16/21 0653    Specimen: Blood, Venous Line Updated: 01/16/21 0800    Narrative:      The following orders were created for panel order Extra Tubes.  Procedure                               Abnormality         Status                     ---------                               -----------         ------                     Lavender Top[[id number]]                                     Final result                 Please view results for these tests on the individual orders.    Lavender Top [[id number]] Collected: 01/16/21 0653    Specimen: Blood Updated: 01/16/21 0800     Extra Tube hold for add-on     Comment: Auto resulted       Hepatic Function Panel [[id number]]  (Abnormal) Collected: 01/16/21 0653    Specimen: Blood Updated: 01/16/21 0747     Total Protein 6.8 g/dL      Albumin 2.60 g/dL      ALT (SGPT) 12 U/L      AST (SGOT) 19 U/L      Alkaline Phosphatase 138 U/L      Total Bilirubin <0.2 mg/dL      Bilirubin, Direct <0.2 mg/dL      Bilirubin, Indirect --      Comment: Unable to calculate       Creatinine, Serum [[id number]]  (Abnormal) Collected: 01/16/21 0653    Specimen: Blood Updated: 01/16/21 0747     Creatinine 1.97 mg/dL      eGFR Non African Amer 26 mL/min/1.73     Narrative:      GFR Normal >60  Chronic Kidney Disease <60  Kidney Failure <15      POC Glucose Once [[id number]]  (Abnormal) Collected: 01/15/21 1906    Specimen: Blood Updated: 01/15/21 1937     Glucose 310 mg/dL      Comment: RN NotifiedOperator: [id number] Omar REBAMeter ID: [id number]       Blood Culture - Blood, Arm, Right [[id number]] Collected: 01/13/21 1827    Specimen: Blood from Arm, Right Updated: 01/15/21 1846     Blood Culture No growth at 2 days    Blood Culture - Blood, Arm, Left [[id number]] Collected: 01/13/21 1720    Specimen: Blood from Arm, Left Updated: 01/15/21 1731     Blood Culture No growth at 2 days    POC Glucose Once [[id number]]  (Abnormal) Collected: 01/15/21 1643    Specimen: Blood Updated: 01/15/21 1701     Glucose 234 mg/dL      Comment: RN NotifiedOperator: [id number] [facility] DAISHAMeter ID: [id number]           Imaging Results (All)     Procedure Component Value Units Date/Time    XR Chest 1 View [[id number]] Collected: 01/13/21 1606     Updated: 01/13/21 1625    Narrative:      PROCEDURE: Portable chest x-ray    TECHNIQUE: Single frontal view of the chest    COMPARISON: 10/5/2020. 9/10/2018    HISTORY: SOA    FINDINGS:  Sternal suture wire appearance is unchanged since 2018. There are  interstitial opacifications appearance in the perihilar regions  which could be due to interstitial edema. The cardiac silhouette  is enlarged.      Impression:      There are interstitial opacifications appearance in the perihilar  regions which could be due to interstitial edema.    Electronically signed by:  Umesh Parra MD  1/13/2021 4:24 PM [facility]  Workstation: [id number]            Chief Complaint on Day of Discharge: none    Hospital Course:  60-year-old  female with  "past medical history of COPD, chronic kidney disease, coronary artery disease, type 2 diabetes, hypertension, hyperlipidemia, morbid obesity who presented on 1/13/2021 with cough, weakness, fever after testing positive for COVID-19.  Patient was admitted for COVID-19 viral pneumonia and provided oxygen support as needed, albuterol, remdesivir, and Lovenox for DVT prevention.  Patient was provided with IV hydration and antiemetics related to gastrointestinal symptoms.  With above-mentioned treatment, patient's condition has improved.  Patient is no longer experiencing nausea, vomiting, diarrhea and renal function is stabilized to her baseline.  She is tolerating oral intake without issue.  She will be discharged home in stable condition with instructions for 1 week follow-up with her primary care provider.    Condition on Discharge:  Stable     Physical Exam on Discharge:  /72 (BP Location: Left arm, Patient Position: Lying)   Pulse 71   Temp 96.8 °F (36 °C) (Axillary)   Resp 18   Ht 157.5 cm (62\")   Wt 126 kg (276 lb 12.8 oz)   LMP  (LMP Unknown)   SpO2 97%   BMI 50.63 kg/m²   Physical Exam  Vitals signs and nursing note reviewed.   Constitutional:       General: She is not in acute distress.     Appearance: Normal appearance. She is obese.   HENT:      Head: Normocephalic and atraumatic.      Right Ear: External ear normal.      Left Ear: External ear normal.      Nose: Nose normal.      Mouth/Throat:      Mouth: Mucous membranes are moist.      Pharynx: Oropharynx is clear.   Eyes:      General: No scleral icterus.        Right eye: No discharge.         Left eye: No discharge.      Conjunctiva/sclera: Conjunctivae normal.   Neck:      Musculoskeletal: Normal range of motion and neck supple.   Cardiovascular:      Rate and Rhythm: Normal rate and regular rhythm.      Pulses: Normal pulses.      Heart sounds: Normal heart sounds. No murmur. No friction rub. No gallop.    Pulmonary:      Effort: " Pulmonary effort is normal. No respiratory distress.      Breath sounds: Normal breath sounds. No stridor. No wheezing, rhonchi or rales.   Abdominal:      General: Bowel sounds are normal. There is no distension.      Palpations: Abdomen is soft.      Tenderness: There is no abdominal tenderness.   Musculoskeletal: Normal range of motion.         General: No swelling.   Skin:     General: Skin is warm and dry.   Neurological:      General: No focal deficit present.      Mental Status: She is alert and oriented to person, place, and time.   Psychiatric:         Mood and Affect: Mood normal.         Behavior: Behavior normal.           Discharge Disposition:  Home-Health Care [facility]    Discharge Medications:     Discharge Medications      New Medications      Instructions Start Date   ondansetron ODT 4 MG disintegrating tablet  Commonly known as: Zofran ODT   4 mg, Translingual, Every 8 Hours PRN         Changes to Medications      Instructions Start Date   metoprolol tartrate 100 MG tablet  Commonly known as: LOPRESSOR  What changed: when to take this   100 mg, Oral, Every 12 Hours Scheduled         Continue These Medications      Instructions Start Date   albuterol sulfate  (90 Base) MCG/ACT inhaler  Commonly known as: PROVENTIL HFA;VENTOLIN HFA;PROAIR HFA   2 puffs, Inhalation, Every 4 Hours PRN      atorvastatin 40 MG tablet  Commonly known as: LIPITOR   40 mg, Oral, Daily      cetirizine 10 MG tablet  Commonly known as: zyrTEC   10 mg, Oral, Daily      clopidogrel 75 MG tablet  Commonly known as: PLAVIX   75 mg, Oral, Daily      [employer] Blood Glucose Meter w/Device kit   1 each, Does not apply, 3 Times Daily      cyclobenzaprine 10 MG tablet  Commonly known as: FLEXERIL   10 mg, Oral, 2 Times Daily PRN      Empagliflozin 25 MG tablet   25 mg, Oral, Daily      ferrous sulfate 325 (65 FE) MG tablet  Commonly known as: FeroSul   325 mg, Oral, Daily With Breakfast      gabapentin 800 MG tablet  Commonly known as:  NEURONTIN   TAKE ONE TABLET BY MOUTH THREE TIMES A DAY FOR NEUOPATHY      glucose blood test strip   Use as instructed      Insulin Glargine 100 UNIT/ML injection pen  Commonly known as: Lantus SoloStar   95 Units, Subcutaneous, Every 12 Hours      Insulin Pen Needle 30G X 8 MM misc  Commonly known as: NovoFine   As directed 4 times daily      Easy Touch Pen Needles 31G X 8 MM misc  Generic drug: Insulin Pen Needle   No dose, route, or frequency recorded.      isosorbide mononitrate 30 MG 24 hr tablet  Commonly known as: IMDUR   30 mg, Oral, Daily      lisinopril 10 MG tablet  Commonly known as: PRINIVIL,ZESTRIL   10 mg, Oral, Daily      montelukast 10 MG tablet  Commonly known as: SINGULAIR   10 mg, Oral, Nightly      nitroglycerin 0.4 MG SL tablet  Commonly known as: NITROSTAT   0.4 mg, Sublingual, As Needed, Take no more than 3 doses in 15 minutes.       nystatin [id number] UNIT/GM powder  Commonly known as: MYCOSTATIN   Topical, 3 Times Daily      oxybutynin XL 5 MG 24 hr tablet  Commonly known as: DITROPAN-XL   5 mg, Oral, Daily      pantoprazole 40 MG EC tablet  Commonly known as: PROTONIX   40 mg, Oral, Nightly      potassium chloride 10 MEQ CR capsule  Commonly known as: MICRO-K   10 mEq, Oral, Daily      promethazine 25 MG tablet  Commonly known as: PHENERGAN   25 mg, Oral, Every 6 Hours PRN      sodium bicarbonate 325 MG tablet   325 mg, Oral, 2 Times Daily             Discharge Diet:   Diet Instructions     Diet: Consistent Carbohydrate, Cardiac; Thin      Discharge Diet:  Consistent Carbohydrate  Cardiac       Fluid Consistency: Thin          Activity at Discharge:   Activity Instructions     Activity as Tolerated            Discharge Care Plan/Instructions: Follow-up with PCP within 1 week of discharge.    Follow-up Appointments:   Additional Instructions for the Follow-ups that You Need to Schedule     Ambulatory Referral to Home Health   As directed      Face to Face Visit Date: 1/15/2021    Follow-up  provider for Plan of Care?: I treated the patient in an acute care facility and will not continue treatment after discharge.    Follow-up provider: NIRMAL LOPEZ [[id number]]    Reason/Clinical Findings: transition vist post covid    Describe mobility limitations that make leaving home difficult: transition visit post covid    Nursing/Therapeutic Services Requested: Skilled Nursing    Skilled nursing orders: Other    Frequency: 1 Week 1         Discharge Follow-up with PCP   As directed       Currently Documented PCP:    Nirmal Lopez MD    PCP Phone Number:    [phone]     Follow Up Details: one week            Contact information for follow-up providers     Nirmal Lopez MD .    Specialties: Family Medicine, Emergency Medicine  Why: one week  Contact information:  [address] [facility] [address] [phone]                   Contact information for after-discharge care     Home Medical Care     [facility] .    Service: Home Health Services  Contact information:  [address] [facility] [address] [phone]                             Test Results Pending at Discharge:   Pending Labs     Order Current Status    Blood Culture - Blood, Arm, Left Preliminary result    Blood Culture - Blood, Arm, Right Preliminary result                This document has been electronically signed by BRIDGETT Joe on January 16, 2021 11:30 CST        Time: 30 minutes spent on assessment, discussion, management, and discharge planning for this patient.

## 2021-01-16 NOTE — PLAN OF CARE
Goal Outcome Evaluation:  Plan of Care Reviewed With: patient  Progress: improving   Patient is on room air anticipating discharge.

## 2021-01-16 NOTE — DISCHARGE PLACEMENT REQUEST
"Yamileth Slater (60 y.o. Female)     Date of Birth Social Security Number Address Home Phone MRN    1960  [address]  [facility] [address] [phone] [id number]    Catholic Marital Status          Hoahaoism        Admission Date Admission Type Admitting Provider Attending Provider Department, Room/Bed    1/13/21 Emergency Alvarez Escalona MD Popescu, Tudor, MD [address], 420/1    Discharge Date Discharge Disposition Discharge Destination         Home-Health Care [facility]              Attending Provider: Alvarez Escalona MD    Allergies: Adhesive Tape, Other    Isolation: Enh Drop/Con   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), COVID (confirmed) (01/14/21)   Code Status: CPR    Ht: 157.5 cm (62\")   Wt: 126 kg (276 lb 12.8 oz)    Admission Cmt: None   Principal Problem: Pneumonia due to COVID-19 virus [U07.1,J12.82]                 Active Insurance as of 1/13/2021     Primary Coverage     Payor Plan Insurance Group Employer/Plan Group    HUMANA MEDICARE REPLACEMENT HUMANA MEDICARE REPLACEMENT [id number]     Payor Plan Address Payor Plan Phone Number Payor Plan Fax Number Effective Dates    [address] [address] [phone]  7/1/2020 - None Entered    [facility] [address]       Subscriber Name Subscriber Birth Date Member ID       YAMILETH SLATER 1960 [id number]           Secondary Coverage     Payor Plan Insurance Group Employer/Plan Group    [place]      Payor Plan Address Payor Plan Phone Number Payor Plan Fax Number Effective Dates    [address] [phone]  6/28/2019 - None Entered    [facility] [address]       Subscriber Name Subscriber Birth Date Member ID       YAMILETH SLATER 1960 [id number]                 Emergency Contacts      (Rel.) Home Phone Work Phone Mobile Phone    Huy Slater (Spouse) [phone] -- [phone]    Yamileth Chavez (Daughter) [phone] -- [phone]    Suzanne Rivera (Daughter) " [phone] -- [phone]        [address]  [facility] [address]  Phone:  [phone]  Fax:   Date: Kyle 15, 2021      Ambulatory Referral to Home Health     Patient:  Yamileth Slater MRN:  [id number]   [address]  [place] [address] :  1960  SSN:    Phone: [phone] Sex:  F      INSURANCE PAYOR PLAN GROUP # SUBSCRIBER ID   Primary:  Secondary:    HUMANA MEDICARE REPLACEMENT  [place] [id number]  [id number]    [id number]  [id number]      Referring Provider Information:  ADEN BURGESS Phone: [phone] Fax:       Referral Information:   # Visits:  1 Referral Type: Home Health [42]   Urgency:  Routine Referral Reason: Specialty Services Required   Start Date: Kyle 15, 2021 End Date:  To be determined by Insurer   Diagnosis: Pneumonia due to COVID-19 virus (U07.1,J12.82 [ICD-10-CM] 480.8 [ICD-9-CM])      Refer to Dept:   Refer to Provider:   Refer to Facility:       Face to Face Visit Date: 1/15/2021  Follow-up provider for Plan of Care? I treated the patient in an acute care facility and will not continue treatment after discharge.  Follow-up provider: ELIZABET LOPEZ [[id number]]  Reason/Clinical Findings: transition vist post covid  Describe mobility limitations that make leaving home difficult: transition visit post covid  Nursing/Therapeutic Services Requested: Skilled Nursing  Skilled nursing orders: Other  Frequency: 1 Week 1     This document serves as a request of services and does not constitute Insurance authorization or approval of services.  To determine eligibility, please contact the members Insurance carrier to verify and review coverage.     If you have medical questions regarding this request for services. Please contact [address] at [phone] during normal business hours.       Authorizing Provider:Aden Burgess APRN  Authorizing Provider's NPI:  [id number]  Order Entered By: Zeina Burgess APRN 1/15/2021 10:17 AM     Electronically signed by: Zeina Burgess APRN 1/15/2021 10:17 AM            Emergency Contact Information     Name Relation Home Work Mobile    Huy Slater Spouse [phone]    Yamileth Chavez Daughter [phone]    Suzanne Rivera Daughter [phone]          Insurance Information                HUMANA MEDICARE REPLACEMENT/HUMANA MEDICARE REPLACEMENT Phone: [phone]    Subscriber: Yamileth Slater Subscriber#: [id number]    Group#: [id number] Precert#:         [place] Phone: [phone]    Subscriber: Yamileth Slater Subscriber#: [id number]    Group#:  Precert#:

## 2021-01-16 NOTE — OUTREACH NOTE
Prep Survey      Responses   [facility] facility patient discharged from?  [place]   Is LACE score < 7 ?  No   Emergency Room discharge w/ pulse ox?  No   Eligibility  Readm Mgmt   Discharge diagnosis  Pneumonia due to COVID-19 virus   Does the patient have one of the following disease processes/diagnoses(primary or secondary)?  COVID-19   Does the patient have Home health ordered?  Yes   What is the Home health agency?   Transition visit   Is there a DME ordered?  No   Prep survey completed?  Yes          Jaja Sawyer RN

## 2021-01-17 ENCOUNTER — READMISSION MANAGEMENT (OUTPATIENT)
Dept: CALL CENTER | Facility: HOSPITAL | Age: 61
End: 2021-01-17

## 2021-01-17 NOTE — OUTREACH NOTE
COVID-19 Week 1 Survey      Responses   [facility] patient discharged from?  [place]   Does the patient have one of the following disease processes/diagnoses(primary or secondary)?  COVID-19   COVID-19 underlying condition?  COPD   Call Number  Call 1   Week 1 Call successful?  Yes   Call start time  0905   Call end time  0930   Discharge diagnosis  Pneumonia due to COVID-19 virus   Is patient permission given to speak with other caregiver?  Yes   List who call center can speak with  - Huy   Comments regarding appointments  She has an appt on 1/25 with Dr. Calvillo, PCP   Does the patient have a primary care provider?   Yes   Does the patient have an appointment with their PCP or specialist within 7 days of discharge?  No   What is preventing the patient from scheduling follow up appointments within 7 days of discharge?  Earlier appointment not available   Nursing Interventions  Verified appointment date/time/provider   Has the patient kept scheduled appointments due by today?  N/A   What is the Home health agency?   Transition visit   Has home health visited the patient within 72 hours of discharge?  Call prior to 72 hours   DME interventions  Other   DME comments  She has a pulse Ox at home, has not used it yet, encouraged her to use it 2-3xday   Psychosocial issues?  No   Did the patient receive a copy of their discharge instructions?  Yes   Did the patient receive a copy of COVID-19 specific instructions?  Yes   Nursing interventions  Reviewed instructions with patient   What is the patient's perception of their health status since discharge?  Same   Does the patient have any of the following symptoms?  Cough   Nursing Interventions  Nurse provided patient education   Pulse Ox monitoring  None   Is the patient/caregiver able to teach back steps to recovery at home?  Set small, achievable goals for return to baseline health, Rest and rebuild strength, gradually increase activity, Practice good  oral hygiene, Eat a well-balance diet   If the patient is a current smoker, are they able to teach back resources for cessation?  [id number]   Is the patient/caregiver able to teach back the hierarchy of who to call/visit for symptoms/problems? PCP, Specialist, Home health nurse, Urgent Care, ED, 911  Yes   Is the patient able to teach back COPD zones?  Yes   Patient reports what zone on this call?  Yellow Zone   Yellow Zone  Unable to complete daily activities   Yellow interventions  Continue to use daily medications, Use oxygen as ordered, Do not smoke, Get plenty of rest, Call provider immediatly if symptoms do not improve   COVID-19 call completed?  Yes          Keyona Ponce, RN

## 2021-01-18 ENCOUNTER — READMISSION MANAGEMENT (OUTPATIENT)
Dept: CALL CENTER | Facility: HOSPITAL | Age: 61
End: 2021-01-18

## 2021-01-18 LAB
BACTERIA SPEC AEROBE CULT: NORMAL
BACTERIA SPEC AEROBE CULT: NORMAL
GLUCOSE BLDC GLUCOMTR-MCNC: 206 MG/DL (ref 70–130)
GLUCOSE BLDC GLUCOMTR-MCNC: 324 MG/DL (ref 70–130)

## 2021-01-18 NOTE — OUTREACH NOTE
COVID-19 Week 1 Survey      Responses   [facility] patient discharged from?  [place]   Does the patient have one of the following disease processes/diagnoses(primary or secondary)?  COVID-19   COVID-19 underlying condition?  COPD   Call Number  Call 2   Week 1 Call successful?  No          Jael Faith RN

## 2021-01-19 ENCOUNTER — READMISSION MANAGEMENT (OUTPATIENT)
Dept: CALL CENTER | Facility: HOSPITAL | Age: 61
End: 2021-01-19

## 2021-01-19 NOTE — OUTREACH NOTE
COVID-19 Week 1 Survey      Responses   [facility] patient discharged from?  [place]   Does the patient have one of the following disease processes/diagnoses(primary or secondary)?  COVID-19   COVID-19 underlying condition?  COPD   Call Number  Call 3   Week 1 Call successful?  No   Discharge diagnosis  Pneumonia due to COVID-19 virus          Melisa Rivera RN

## 2021-01-22 ENCOUNTER — READMISSION MANAGEMENT (OUTPATIENT)
Dept: CALL CENTER | Facility: HOSPITAL | Age: 61
End: 2021-01-22

## 2021-01-22 NOTE — OUTREACH NOTE
COVID-19 Week 1 Survey      Responses   [facility] patient discharged from?  [place]   Does the patient have one of the following disease processes/diagnoses(primary or secondary)?  COVID-19   COVID-19 underlying condition?  COPD   Call Number  Call 4   Week 1 Call successful?  Yes   Call start time  1431   Call end time  1438   Discharge diagnosis  Pneumonia due to COVID-19 virus   Meds reviewed with patient/caregiver?  Yes   Is the patient having any side effects they believe may be caused by any medication additions or changes?  No   Does the patient have all medications ordered at discharge?  Yes   Is the patient taking all medications as directed (includes completed medication regime)?  Yes   Does the patient have a primary care provider?   Yes   Has the patient kept scheduled appointments due by today?  N/A   Comments  Appt Monday   Home health comments  She did not hear from them.   Has all DME been delivered?  Yes   Psychosocial issues?  No   Did the patient receive a copy of their discharge instructions?  Yes   Did the patient receive a copy of COVID-19 specific instructions?  Yes   Nursing interventions  Reviewed instructions with patient   What is the patient's perception of their health status since discharge?  Improving   Does the patient have any of the following symptoms?  Cough, Fever/chills, Shortness of breath [CHills]   Nursing Interventions  Nurse provided patient education   Pulse Ox monitoring  Intermittent   Pulse Ox device source  Patient   O2 Sat comments  97% on RA   O2 Sat: education provided  Sat levels, Monitoring frequency, When to seek care   O2 Sat education comments  If 90% or below and stays there, call 911.   Is the patient/caregiver able to teach back steps to recovery at home?  Set small, achievable goals for return to baseline health, Rest and rebuild strength, gradually increase activity, Eat a well-balance diet, Make a list of questions for provider's appointment    If the patient is a current smoker, are they able to teach back resources for cessation?  [id number]   Is the patient/caregiver able to teach back the hierarchy of who to call/visit for symptoms/problems? PCP, Specialist, Home health nurse, Urgent Care, ED, 911  Yes   Is the patient able to teach back COPD zones?  Yes   Patient reports what zone on this call?  Green Zone   Green Zone  Reports doing well, Breathing without shortness of breath, Usual activity and exercise level   Green Zone interventions:  Take daily medications, Use oxygen as prescribed, Continue regular exercise/diet plan   COVID-19 call completed?  Yes   Wrap up additional comments  MAny appts coming up and will have a CXR. Encouraged toothbrush change.          Jaja Sawyer RN

## 2021-01-25 ENCOUNTER — OFFICE VISIT (OUTPATIENT)
Dept: FAMILY MEDICINE CLINIC | Facility: CLINIC | Age: 61
End: 2021-01-25

## 2021-01-25 ENCOUNTER — READMISSION MANAGEMENT (OUTPATIENT)
Dept: CALL CENTER | Facility: HOSPITAL | Age: 61
End: 2021-01-25

## 2021-01-25 VITALS — BODY MASS INDEX: 50.79 KG/M2 | WEIGHT: 276 LBS | HEIGHT: 62 IN

## 2021-01-25 DIAGNOSIS — IMO0002 UNCONTROLLED TYPE 2 DIABETES MELLITUS WITH DIABETIC POLYNEUROPATHY, WITH LONG-TERM CURRENT USE OF INSULIN: ICD-10-CM

## 2021-01-25 DIAGNOSIS — Z09 HOSPITAL DISCHARGE FOLLOW-UP: Primary | ICD-10-CM

## 2021-01-25 PROCEDURE — 99442 PR PHYS/QHP TELEPHONE EVALUATION 11-20 MIN: CPT | Performed by: STUDENT IN AN ORGANIZED HEALTH CARE EDUCATION/TRAINING PROGRAM

## 2021-01-25 RX ORDER — GABAPENTIN 800 MG/1
TABLET ORAL
Qty: 90 TABLET | Refills: 0 | Status: CANCELLED | OUTPATIENT
Start: 2021-01-25

## 2021-01-25 NOTE — OUTREACH NOTE
COVID-19 Week 2 Survey      Responses   [facility] patient discharged from?  [place]   Does the patient have one of the following disease processes/diagnoses(primary or secondary)?  COVID-19   COVID-19 underlying condition?  None   Call Number  Call 1   COVID-19 Week 2: Call 1 attempt successful?  No   Discharge diagnosis  Pneumonia due to COVID-19 virus          Marielena Amezcua RN

## 2021-01-25 NOTE — PROGRESS NOTES
I have reviewed the notes, assessments, and/or procedures performed by Dr. Calvillo, I concur with her/his documentation and assessment and plan for Yamileth Slater.       This document has been electronically signed by Kit Brar MD on January 25, 2021 16:04 CST

## 2021-01-25 NOTE — PROGRESS NOTES
Family Medicine Residency  Nirmal Calvillo MD    Subjective:     Yamileth Slater is a 60 y.o. female who presents for hospital follow up.  She was admitted from 1/13/2021 until 1/16/2021 for pneumonia due to COVID-19. Improving with continued shortness of breath, phlegm in throat difficult to expectorate and pain in R lung. No fevers noted.     Briefly wanted to discuss diabetes as her fasting blood sugars morning have been over 200.  She is currently taking Basaglar 100 units twice daily, and Jardiance 25 mg daily.  She was taken off Metformin and Victoza due to poor kidney function, and pancreatitis, respectively.    The following portions of the patient's history were reviewed and updated as appropriate: allergies, current medications, past family history, past medical history, past social history, past surgical history and problem list.    Past Medical Hx:  Past Medical History:   Diagnosis Date   • Anxiety    • Carotid artery stenosis    • Chronic obstructive lung disease (CMS/HCC)    • CKD (chronic kidney disease) stage 4, GFR 15-29 ml/min (CMS/HCC)    • Colonic polyp    • Coronary arteriosclerosis    • Diabetes mellitus (CMS/HCC)    • Diabetic neuropathy (CMS/HCC)    • GERD (gastroesophageal reflux disease)    • Hypercholesterolemia    • Hypertension    • Morbid obesity (CMS/HCC)    • Nephrolithiasis    • Peripheral vascular disease (CMS/HCC)    • Sleep apnea    • Substance abuse (CMS/HCC)    • Vitamin D deficiency        Past Surgical Hx:  Past Surgical History:   Procedure Laterality Date   • CARDIAC CATHETERIZATION N/A 7/14/2020   • CAROTID STENT Left    • COLONOSCOPY     • CORONARY ARTERY BYPASS GRAFT     • CYSTOSCOPY BLADDER STONE LITHOTRIPSY Bilateral        Current Meds:    Current Outpatient Medications:   •  albuterol sulfate  (90 Base) MCG/ACT inhaler, Inhale 2 puffs Every 4 (Four) Hours As Needed for Wheezing., Disp: 18 g, Rfl: 1  •  atorvastatin (LIPITOR) 40 MG tablet, Take 1  tablet by mouth Daily., Disp: 90 tablet, Rfl: 0  •  Blood Glucose Monitoring Suppl ([employer] Blood Glucose Meter) w/Device kit, 1 each 3 (Three) Times a Day., Disp: 1 kit, Rfl: 3  •  cetirizine (zyrTEC) 10 MG tablet, Take 1 tablet by mouth Daily., Disp: 30 tablet, Rfl: 3  •  clopidogrel (PLAVIX) 75 MG tablet, Take 1 tablet by mouth Daily., Disp: 30 tablet, Rfl: 5  •  cyclobenzaprine (FLEXERIL) 10 MG tablet, Take 1 tablet by mouth 2 (Two) Times a Day As Needed for Muscle Spasms., Disp: 60 tablet, Rfl: 5  •  EASY TOUCH PEN NEEDLES 31G X 8 MM misc, , Disp: , Rfl:   •  Empagliflozin 25 MG tablet, Take 25 mg by mouth Daily., Disp: 30 tablet, Rfl: 1  •  ferrous sulfate (FeroSul) 325 (65 FE) MG tablet, Take 1 tablet by mouth Daily With Breakfast., Disp: 30 tablet, Rfl: 3  •  gabapentin (NEURONTIN) 800 MG tablet, TAKE ONE TABLET BY MOUTH THREE TIMES A DAY FOR NEUOPATHY, Disp: 90 tablet, Rfl: 0  •  glucose blood test strip, Use as instructed, Disp: 100 each, Rfl: 12  •  Insulin Glargine (Lantus SoloStar) 100 UNIT/ML injection pen, Inject 95 Units under the skin into the appropriate area as directed Every 12 (Twelve) Hours., Disp: 60 mL, Rfl: 11  •  Insulin Pen Needle (NovoFine) 30G X 8 MM misc, As directed 4 times daily, Disp: 100 each, Rfl: 11  •  isosorbide mononitrate (IMDUR) 30 MG 24 hr tablet, Take 30 mg by mouth Daily., Disp: , Rfl:   •  lisinopril (PRINIVIL,ZESTRIL) 10 MG tablet, Take 1 tablet by mouth Daily., Disp: 30 tablet, Rfl: 5  •  montelukast (SINGULAIR) 10 MG tablet, Take 1 tablet by mouth Every Night., Disp: 30 tablet, Rfl: 5  •  nitroglycerin (NITROSTAT) 0.4 MG SL tablet, Place 1 tablet under the tongue As Needed for Chest Pain. Take no more than 3 doses in 15 minutes., Disp: 30 tablet, Rfl: 3  •  nystatin (MYCOSTATIN) 913895 UNIT/GM powder, Apply  topically to the appropriate area as directed 3 (Three) Times a Day., Disp: 15 g, Rfl: 1  •  ondansetron ODT (Zofran ODT) 4 MG disintegrating tablet, Place 1 tablet on  the tongue Every 8 (Eight) Hours As Needed for Nausea or Vomiting., Disp: 30 tablet, Rfl: 0  •  oxybutynin XL (DITROPAN-XL) 5 MG 24 hr tablet, Take 5 mg by mouth Daily., Disp: , Rfl:   •  pantoprazole (PROTONIX) 40 MG EC tablet, Take 1 tablet by mouth Every Night., Disp: 30 tablet, Rfl: 5  •  potassium chloride (MICRO-K) 10 MEQ CR capsule, Take 1 capsule by mouth Daily., Disp: 30 capsule, Rfl: 5  •  promethazine (PHENERGAN) 25 MG tablet, Take 1 tablet by mouth Every 6 (Six) Hours As Needed for Nausea or Vomiting., Disp: 30 tablet, Rfl: 0  •  sodium bicarbonate 325 MG tablet, Take 1 tablet by mouth 2 (Two) Times a Day., Disp: 60 tablet, Rfl: 5  •  insulin aspart (novoLOG FLEXPEN) 100 UNIT/ML solution pen-injector sc pen, Inject 5 Units under the skin into the appropriate area as directed 3 (Three) Times a Day With Meals., Disp: 1 pen, Rfl: 1  •  metoprolol tartrate (LOPRESSOR) 100 MG tablet, Take 1 tablet by mouth Every 12 (Twelve) Hours for 30 days. (Patient taking differently: Take 100 mg by mouth 2 (Two) Times a Day.), Disp: 60 tablet, Rfl: 0    Allergies:  Allergies   Allergen Reactions   • Adhesive Tape Hives   • Other      Bandaids, MRSA, SURECLOSE       Family Hx:  Family History   Problem Relation Age of Onset   • Heart disease Mother    • Heart disease Father    • Heart disease Sister    • Heart disease Brother         Social History:  Social History     Socioeconomic History   • Marital status:      Spouse name: wesley dumont   • Number of children: Not on file   • Years of education: Not on file   • Highest education level: Not on file   Tobacco Use   • Smoking status: Current Every Day Smoker     Packs/day: 0.25     Years: 46.00     Pack years: 11.50     Types: Cigarettes   • Smokeless tobacco: Never Used   Substance and Sexual Activity   • Alcohol use: No   • Drug use: Yes     Types: LSD, Marijuana, Methamphetamines   • Sexual activity: Not Currently       Review of Systems  Review of Systems    "  Constitutional: Negative for activity change, appetite change and fever.   HENT: Negative for congestion and trouble swallowing.         Phlegm   Respiratory: Positive for shortness of breath. Negative for chest tightness.    Cardiovascular: Positive for chest pain (R sided). Negative for palpitations.   Gastrointestinal: Negative for abdominal distention and abdominal pain.   Genitourinary: Negative for difficulty urinating and dysuria.   Musculoskeletal: Negative for arthralgias and myalgias.   Skin: Negative for color change and pallor.   Neurological: Negative for dizziness, light-headedness and headaches.   Psychiatric/Behavioral: Negative for agitation and behavioral problems.       Objective:     Ht 157.5 cm (62\")   Wt 125 kg (276 lb)   LMP  (LMP Unknown)   BMI 50.48 kg/m²   Physical Exam    No physical exam was performed on this telephone visit.      Assessment/Plan:     Diagnoses and all orders for this visit:    1. Hospital discharge follow up      2. Uncontrolled type 2 diabetes mellitus with diabetic polyneuropathy, with long-term current use of insulin (CMS/[facility])        - Insulin aspart (novoLOG FLEXPEN) 100 UNIT/ML solution pen-injector sc pen 5 untis TID with meals.        Continue to monitor respiratory symptoms in light of pneumonia due to to COVID-19.  Should call health department to find out when isolation ends.  In addition, added insulin aspart 5 units 3 times daily with meals due to elevated blood sugars and discontinuation of Victoza and Metformin due to pancreatitis, and renal disease, respectively.  Jardiance was increased to 25 mg from 10 mg.  We will continue to monitor.      · Rx changes: Started insulin aspart 5 units 3 times daily with meals  · Patient Education: Continue to monitor glucose  · Compliance at present is estimated to be excellent.   · Efforts to improve compliance (if necessary) will be directed at Unnecessary at this time.     Follow-up:     Return in about 4 weeks " (around 2/22/2021) for Recheck.    Preventative:  Health Maintenance   Topic Date Due   • ZOSTER VACCINE (1 of 2) 03/10/2009   • ANNUAL WELLNESS VISIT  08/24/2016   • DIABETIC EYE EXAM  08/24/2016   • URINE MICROALBUMIN  10/05/2018   • DIABETIC FOOT EXAM  12/26/2019   • PAP SMEAR  01/04/2020   • LUNG CANCER SCREENING  01/10/2021   • COLONOSCOPY  05/02/2021   • HEMOGLOBIN A1C  06/04/2021   • LIPID PANEL  06/24/2021   • MAMMOGRAM  01/10/2022   • TDAP/TD VACCINES (3 - Td) 11/10/2024   • HEPATITIS C SCREENING  Completed   • Pneumococcal Vaccine 0-64  Completed   • INFLUENZA VACCINE  Completed   • MENINGOCOCCAL VACCINE  Aged Out       Recommended: none  Vaccine Counseling: N/A    Weight  -Class: Obese Class III extreme obesity: > or equal to 40kg/m2  -Patient's Body mass index is 50.48 kg/m². BMI is above normal parameters. Recommendations include: exercise counseling and nutrition counseling.   eat more fruits and vegetables, decrease soda or juice intake, increase water intake, increase physical activity, reduce screen time and reduce portion size    Alcohol use:  reports no history of alcohol use.  Nicotine status  reports that she has been smoking cigarettes. She has a 11.50 pack-year smoking history. She has never used smokeless tobacco.    Goals     • Fasting Blood Glucose       Barriers: compliance with diet      • Quit smoking / using tobacco           RISK SCORE: 4          This document has been electronically signed by Nirmal Calvillo MD on January 25, 2021 12:06 CST    You have chosen to receive care through a telephone visit. Do you consent to use a telephone visit for your medical care today? Yes     Total duration of this telephone visit was 12 minutes

## 2021-01-28 DIAGNOSIS — Z79.4 TYPE 2 DIABETES MELLITUS WITH DIABETIC POLYNEUROPATHY, WITH LONG-TERM CURRENT USE OF INSULIN (HCC): Primary | ICD-10-CM

## 2021-01-28 DIAGNOSIS — E11.42 TYPE 2 DIABETES MELLITUS WITH DIABETIC POLYNEUROPATHY, WITH LONG-TERM CURRENT USE OF INSULIN (HCC): Primary | ICD-10-CM

## 2021-01-28 RX ORDER — DULOXETIN HYDROCHLORIDE 20 MG/1
20 CAPSULE, DELAYED RELEASE ORAL DAILY
Qty: 30 CAPSULE | Refills: 0 | Status: SHIPPED | OUTPATIENT
Start: 2021-01-28 | End: 2021-02-19 | Stop reason: SDUPTHER

## 2021-01-29 ENCOUNTER — PATIENT OUTREACH (OUTPATIENT)
Dept: FAMILY MEDICINE CLINIC | Facility: CLINIC | Age: 61
End: 2021-01-29

## 2021-01-29 RX ORDER — PROCHLORPERAZINE 25 MG/1
1 SUPPOSITORY RECTAL
Qty: 1 EACH | Refills: 3 | Status: SHIPPED | OUTPATIENT
Start: 2021-01-29 | End: 2021-03-31

## 2021-01-29 RX ORDER — PROCHLORPERAZINE 25 MG/1
SUPPOSITORY RECTAL
Qty: 3 EACH | Refills: 11 | Status: SHIPPED | OUTPATIENT
Start: 2021-01-29 | End: 2021-02-23

## 2021-01-29 RX ORDER — PROCHLORPERAZINE 25 MG/1
1 SUPPOSITORY RECTAL CONTINUOUS
Qty: 1 EACH | Refills: 0 | Status: SHIPPED | OUTPATIENT
Start: 2021-01-29 | End: 2021-03-31

## 2021-02-08 LAB
QT INTERVAL: 374 MS
QTC INTERVAL: 487 MS

## 2021-02-15 ENCOUNTER — TELEPHONE (OUTPATIENT)
Dept: FAMILY MEDICINE CLINIC | Facility: CLINIC | Age: 61
End: 2021-02-15

## 2021-02-15 DIAGNOSIS — IMO0002 UNCONTROLLED TYPE 2 DIABETES MELLITUS WITH DIABETIC POLYNEUROPATHY, WITH LONG-TERM CURRENT USE OF INSULIN: ICD-10-CM

## 2021-02-15 NOTE — TELEPHONE ENCOUNTER
PT CALLED REQUESTING A REFILL OF     gabapentin (NEURONTIN) 800 MG tablet       SENT TO [facility]. SHE IS OUT     HER CALL BACK NUMBER [phone].

## 2021-02-16 RX ORDER — GABAPENTIN 800 MG/1
TABLET ORAL
Qty: 90 TABLET | Refills: 0 | Status: SHIPPED | OUTPATIENT
Start: 2021-02-16 | End: 2021-03-26 | Stop reason: SDUPTHER

## 2021-02-16 NOTE — TELEPHONE ENCOUNTER
Mamadou reviewed and appropriate, request# [id number]. Needs UDS next month, last on 9/2/20. Gabapentin 800mg TID refilled.          This document has been electronically signed by Nirmal Calvillo MD on February 16, 2021 16:08 CST

## 2021-02-17 DIAGNOSIS — E11.69 DIABETES MELLITUS TYPE 2 IN OBESE (HCC): ICD-10-CM

## 2021-02-17 DIAGNOSIS — E66.9 DIABETES MELLITUS TYPE 2 IN OBESE (HCC): ICD-10-CM

## 2021-02-19 ENCOUNTER — TELEPHONE (OUTPATIENT)
Dept: FAMILY MEDICINE CLINIC | Facility: CLINIC | Age: 61
End: 2021-02-19

## 2021-02-19 ENCOUNTER — PATIENT OUTREACH (OUTPATIENT)
Dept: FAMILY MEDICINE CLINIC | Facility: CLINIC | Age: 61
End: 2021-02-19

## 2021-02-19 DIAGNOSIS — E11.42 TYPE 2 DIABETES MELLITUS WITH DIABETIC POLYNEUROPATHY, WITH LONG-TERM CURRENT USE OF INSULIN (HCC): ICD-10-CM

## 2021-02-19 DIAGNOSIS — Z79.4 TYPE 2 DIABETES MELLITUS WITH DIABETIC POLYNEUROPATHY, WITH LONG-TERM CURRENT USE OF INSULIN (HCC): ICD-10-CM

## 2021-02-19 RX ORDER — DULOXETIN HYDROCHLORIDE 20 MG/1
20 CAPSULE, DELAYED RELEASE ORAL DAILY
Qty: 30 CAPSULE | Refills: 0 | Status: SHIPPED | OUTPATIENT
Start: 2021-02-19 | End: 2021-02-25 | Stop reason: SDUPTHER

## 2021-02-19 NOTE — TELEPHONE ENCOUNTER
PATIENT CALLED ASKING FOR A MEDICATION REFILL ON HER   DULoxetine (Cymbalta) 20 MG capsule. SHE WOULD LIKE THAT SENT TO [facility].    THANKS,  TASHI

## 2021-02-23 ENCOUNTER — PATIENT OUTREACH (OUTPATIENT)
Dept: FAMILY MEDICINE CLINIC | Facility: CLINIC | Age: 61
End: 2021-02-23

## 2021-02-25 ENCOUNTER — OFFICE VISIT (OUTPATIENT)
Dept: FAMILY MEDICINE CLINIC | Facility: CLINIC | Age: 61
End: 2021-02-25

## 2021-02-25 ENCOUNTER — LAB (OUTPATIENT)
Dept: LAB | Facility: HOSPITAL | Age: 61
End: 2021-02-25

## 2021-02-25 VITALS
HEIGHT: 62 IN | BODY MASS INDEX: 52.26 KG/M2 | SYSTOLIC BLOOD PRESSURE: 126 MMHG | DIASTOLIC BLOOD PRESSURE: 78 MMHG | OXYGEN SATURATION: 96 % | WEIGHT: 284 LBS | HEART RATE: 74 BPM

## 2021-02-25 DIAGNOSIS — N39.0 URINARY TRACT INFECTION WITHOUT HEMATURIA, SITE UNSPECIFIED: ICD-10-CM

## 2021-02-25 DIAGNOSIS — Z79.4 TYPE 2 DIABETES MELLITUS WITH DIABETIC POLYNEUROPATHY, WITH LONG-TERM CURRENT USE OF INSULIN (HCC): ICD-10-CM

## 2021-02-25 DIAGNOSIS — Z79.899 OTHER LONG TERM (CURRENT) DRUG THERAPY: ICD-10-CM

## 2021-02-25 DIAGNOSIS — Z87.39 H/O BURNING PAIN IN LEG: ICD-10-CM

## 2021-02-25 DIAGNOSIS — E11.42 TYPE 2 DIABETES MELLITUS WITH DIABETIC POLYNEUROPATHY, WITH LONG-TERM CURRENT USE OF INSULIN (HCC): ICD-10-CM

## 2021-02-25 DIAGNOSIS — IMO0002 UNCONTROLLED TYPE 2 DIABETES MELLITUS WITH DIABETIC POLYNEUROPATHY, WITH LONG-TERM CURRENT USE OF INSULIN: Primary | ICD-10-CM

## 2021-02-25 LAB
AMPHET+METHAMPHET UR QL: NEGATIVE
AMPHETAMINES UR QL: NEGATIVE
BARBITURATES UR QL SCN: NEGATIVE
BENZODIAZ UR QL SCN: NEGATIVE
BUPRENORPHINE SERPL-MCNC: NEGATIVE NG/ML
CANNABINOIDS SERPL QL: NEGATIVE
COCAINE UR QL: NEGATIVE
METHADONE UR QL SCN: NEGATIVE
OPIATES UR QL: NEGATIVE
OXYCODONE UR QL SCN: NEGATIVE
PCP UR QL SCN: NEGATIVE
PROPOXYPH UR QL: NEGATIVE
TRICYCLICS UR QL SCN: NEGATIVE

## 2021-02-25 PROCEDURE — 36415 COLL VENOUS BLD VENIPUNCTURE: CPT

## 2021-02-25 PROCEDURE — 99213 OFFICE O/P EST LOW 20 MIN: CPT | Performed by: STUDENT IN AN ORGANIZED HEALTH CARE EDUCATION/TRAINING PROGRAM

## 2021-02-25 PROCEDURE — 81001 URINALYSIS AUTO W/SCOPE: CPT

## 2021-02-25 PROCEDURE — 80306 DRUG TEST PRSMV INSTRMNT: CPT

## 2021-02-25 PROCEDURE — 83036 HEMOGLOBIN GLYCOSYLATED A1C: CPT

## 2021-02-25 RX ORDER — ALBUTEROL SULFATE 90 UG/1
2 AEROSOL, METERED RESPIRATORY (INHALATION) EVERY 4 HOURS PRN
Qty: 18 G | Refills: 1 | Status: SHIPPED | OUTPATIENT
Start: 2021-02-25 | End: 2021-03-26 | Stop reason: SDUPTHER

## 2021-02-25 RX ORDER — INSULIN GLARGINE 100 [IU]/ML
100 INJECTION, SOLUTION SUBCUTANEOUS EVERY 12 HOURS
Qty: 60 ML | Refills: 11 | Status: SHIPPED | OUTPATIENT
Start: 2021-02-25 | End: 2021-03-26 | Stop reason: SDUPTHER

## 2021-02-25 RX ORDER — DULOXETIN HYDROCHLORIDE 20 MG/1
20 CAPSULE, DELAYED RELEASE ORAL DAILY
Qty: 30 CAPSULE | Refills: 0 | Status: SHIPPED | OUTPATIENT
Start: 2021-02-25 | End: 2021-03-26 | Stop reason: SDUPTHER

## 2021-02-25 RX ORDER — NAPROXEN 500 MG/1
TABLET ORAL
COMMUNITY
Start: 2021-02-17 | End: 2021-03-26

## 2021-02-25 RX ORDER — AZITHROMYCIN 250 MG/1
TABLET, FILM COATED ORAL
COMMUNITY
Start: 2021-01-05 | End: 2021-05-21 | Stop reason: HOSPADM

## 2021-02-25 RX ORDER — LIDOCAINE 50 MG/G
PATCH TOPICAL
COMMUNITY
Start: 2021-02-09 | End: 2021-03-10

## 2021-02-25 RX ORDER — EMPAGLIFLOZIN 25 MG/1
TABLET, FILM COATED ORAL
Qty: 30 TABLET | Refills: 5 | Status: SHIPPED | OUTPATIENT
Start: 2021-02-25 | End: 2021-03-26 | Stop reason: SDUPTHER

## 2021-02-25 RX ORDER — GABAPENTIN 800 MG/1
TABLET ORAL
Qty: 90 TABLET | Refills: 0 | Status: CANCELLED | OUTPATIENT
Start: 2021-02-25

## 2021-02-25 RX ORDER — METHYLPREDNISOLONE 4 MG/1
TABLET ORAL
COMMUNITY
Start: 2021-01-05 | End: 2021-04-12 | Stop reason: HOSPADM

## 2021-02-26 ENCOUNTER — TELEPHONE (OUTPATIENT)
Dept: FAMILY MEDICINE CLINIC | Facility: CLINIC | Age: 61
End: 2021-02-26

## 2021-02-26 LAB
BACTERIA UR QL AUTO: ABNORMAL /HPF
BILIRUB UR QL STRIP: NEGATIVE
CLARITY UR: CLEAR
COLOR UR: YELLOW
GLUCOSE UR STRIP-MCNC: ABNORMAL MG/DL
HBA1C MFR BLD: 11.15 % (ref 4.8–5.6)
HGB UR QL STRIP.AUTO: ABNORMAL
HYALINE CASTS UR QL AUTO: ABNORMAL /LPF
KETONES UR QL STRIP: NEGATIVE
LEUKOCYTE ESTERASE UR QL STRIP.AUTO: NEGATIVE
NITRITE UR QL STRIP: NEGATIVE
PH UR STRIP.AUTO: 7 [PH] (ref 5–8)
PROT UR QL STRIP: ABNORMAL
RBC # UR: ABNORMAL /HPF
REF LAB TEST METHOD: ABNORMAL
SP GR UR STRIP: 1.02 (ref 1–1.03)
SQUAMOUS #/AREA URNS HPF: ABNORMAL /HPF
UROBILINOGEN UR QL STRIP: ABNORMAL
WBC UR QL AUTO: ABNORMAL /HPF
YEAST URNS QL MICRO: ABNORMAL /HPF

## 2021-02-26 NOTE — TELEPHONE ENCOUNTER
[facility] IS REQUESTING A CALL BACK NEEDING TO KNOW DIAGNOSIS CODE FOR FREESTYLE KUSH.    YOU CAN CONTACT FRENCH WITH [facility] [phone].

## 2021-02-26 NOTE — PROGRESS NOTES
Family Medicine Residency  Nirmal Calvillo MD    Subjective:     Yamileth Slater is a 60 y.o. female who presents for multiple complaints.  Current DM regimen consists of Lantus 100 units twice daily, insulin aspart 5 units 3 times daily, and Jardiance 25 mg daily.  Patient states she has been noncompliant with medication regimen and her fasting blood sugars have been between 200 and 300.  Her diet is unhealthy and she is not exercising.  Hemoglobin A1c has increased from 7.60 on September 2nd 2020 up to 9.50 on December 4, 2020.  Patient states she is ashamed as she is a bad about how well controlled her diabetes was until she was discontinued on Metformin due to worsening renal function, and discontinued on Victoza due to pancreatitis episode.    She has been having recurrent falls which occur after she leans forward (for example when taking the leash off her dog, and trying to get her house she was out of under a blanket that was on the floor) she denies any dizziness during those moments.  She usually leans to the right side when she is sitting in a chair but has no leaning when walking.  She states she used to have burning on the back of her right leg prior to stent placement and thigh and after falls the burning has returned.  She did miss her vascular surgery appointment 3 days ago.  Is sleeping throughout the day with daytime drowsiness but is noncompliant with her CPAP after diagnosis of sleep apnea.    The following portions of the patient's history were reviewed and updated as appropriate: allergies, current medications, past family history, past medical history, past social history, past surgical history and problem list.    Past Medical Hx:  Past Medical History:   Diagnosis Date   • Anxiety    • Carotid artery stenosis    • Chronic obstructive lung disease (CMS/[facility])    • CKD (chronic kidney disease) stage 4, GFR 15-29 ml/min (CMS/[facility])    • Colonic polyp    • Coronary arteriosclerosis    •  Diabetes mellitus (CMS/[facility])    • Diabetic neuropathy (CMS/[facility])    • GERD (gastroesophageal reflux disease)    • Hypercholesterolemia    • Hypertension    • Morbid obesity (CMS/[facility])    • Nephrolithiasis    • Peripheral vascular disease (CMS/[facility])    • Sleep apnea    • Substance abuse (CMS/[facility])    • Vitamin D deficiency        Past Surgical Hx:  Past Surgical History:   Procedure Laterality Date   • CARDIAC CATHETERIZATION N/A 7/14/2020   • CAROTID STENT Left    • COLONOSCOPY     • CORONARY ARTERY BYPASS GRAFT     • CYSTOSCOPY BLADDER STONE LITHOTRIPSY Bilateral        Current Meds:    Current Outpatient Medications:   •  albuterol sulfate  (90 Base) MCG/ACT inhaler, Inhale 2 puffs Every 4 (Four) Hours As Needed for Wheezing., Disp: 18 g, Rfl: 1  •  atorvastatin (LIPITOR) 40 MG tablet, Take 1 tablet by mouth Daily., Disp: 90 tablet, Rfl: 0  •  Blood Glucose Monitoring Suppl ([employer] Blood Glucose Meter) w/Device kit, 1 each 3 (Three) Times a Day., Disp: 1 kit, Rfl: 3  •  cetirizine (zyrTEC) 10 MG tablet, Take 1 tablet by mouth Daily., Disp: 30 tablet, Rfl: 3  •  clopidogrel (PLAVIX) 75 MG tablet, Take 1 tablet by mouth Daily., Disp: 30 tablet, Rfl: 5  •  Continuous Blood Gluc  (Dexcom G6 ) device, 1 each Continuous., Disp: 1 each, Rfl: 0  •  Continuous Blood Gluc Sensor (FreeStyle Jade 2 Sensor) misc, 1 each Every 14 (Fourteen) Days., Disp: 2 each, Rfl: 11  •  Continuous Blood Gluc Transmit (Dexcom G6 Transmitter) misc, 1 each Every 3 (Three) Months., Disp: 1 each, Rfl: 3  •  cyclobenzaprine (FLEXERIL) 10 MG tablet, Take 1 tablet by mouth 2 (Two) Times a Day As Needed for Muscle Spasms., Disp: 60 tablet, Rfl: 5  •  EASY TOUCH PEN NEEDLES 31G X 8 MM misc, , Disp: , Rfl:   •  Empagliflozin 25 MG tablet, Take 25 mg by mouth Daily., Disp: 30 tablet, Rfl: 1  •  ferrous sulfate (FeroSul) 325 (65 FE) MG tablet, Take 1 tablet by mouth Daily With Breakfast., Disp: 30 tablet, Rfl: 3  •  gabapentin (NEURONTIN)  800 MG tablet, TAKE ONE TABLET BY MOUTH THREE TIMES A DAY FOR NEUROPATHY, Disp: 90 tablet, Rfl: 0  •  glucose blood test strip, Use as instructed, Disp: 100 each, Rfl: 12  •  insulin aspart (novoLOG FLEXPEN) 100 UNIT/ML solution pen-injector sc pen, Inject 5 Units under the skin into the appropriate area as directed 3 (Three) Times a Day With Meals., Disp: 1 pen, Rfl: 1  •  Insulin Glargine (Lantus SoloStar) 100 UNIT/ML injection pen, Inject 100 Units under the skin into the appropriate area as directed Every 12 (Twelve) Hours., Disp: 60 mL, Rfl: 11  •  Insulin Pen Needle (NovoFine) 30G X 8 MM misc, As directed 4 times daily, Disp: 100 each, Rfl: 11  •  isosorbide mononitrate (IMDUR) 30 MG 24 hr tablet, Take 30 mg by mouth Daily., Disp: , Rfl:   •  lisinopril (PRINIVIL,ZESTRIL) 10 MG tablet, Take 1 tablet by mouth Daily., Disp: 30 tablet, Rfl: 5  •  montelukast (SINGULAIR) 10 MG tablet, Take 1 tablet by mouth Every Night., Disp: 30 tablet, Rfl: 5  •  nitroglycerin (NITROSTAT) 0.4 MG SL tablet, Place 1 tablet under the tongue As Needed for Chest Pain. Take no more than 3 doses in 15 minutes., Disp: 30 tablet, Rfl: 3  •  nystatin (MYCOSTATIN) 500650 UNIT/GM powder, Apply  topically to the appropriate area as directed 3 (Three) Times a Day., Disp: 15 g, Rfl: 1  •  ondansetron ODT (Zofran ODT) 4 MG disintegrating tablet, Place 1 tablet on the tongue Every 8 (Eight) Hours As Needed for Nausea or Vomiting., Disp: 30 tablet, Rfl: 0  •  oxybutynin XL (DITROPAN-XL) 5 MG 24 hr tablet, Take 5 mg by mouth Daily., Disp: , Rfl:   •  pantoprazole (PROTONIX) 40 MG EC tablet, Take 1 tablet by mouth Every Night., Disp: 30 tablet, Rfl: 5  •  potassium chloride (MICRO-K) 10 MEQ CR capsule, Take 1 capsule by mouth Daily., Disp: 30 capsule, Rfl: 5  •  promethazine (PHENERGAN) 25 MG tablet, Take 1 tablet by mouth Every 6 (Six) Hours As Needed for Nausea or Vomiting., Disp: 30 tablet, Rfl: 0  •  sodium bicarbonate 325 MG tablet, Take 1  tablet by mouth 2 (Two) Times a Day., Disp: 60 tablet, Rfl: 5  •  azithromycin (ZITHROMAX) 250 MG tablet, , Disp: , Rfl:   •  DULoxetine (Cymbalta) 20 MG capsule, Take 1 capsule by mouth Daily., Disp: 30 capsule, Rfl: 0  •  lidocaine (LIDODERM) 5 %, , Disp: , Rfl:   •  methylPREDNISolone (MEDROL) 4 MG dose pack, , Disp: , Rfl:   •  metoprolol tartrate (LOPRESSOR) 100 MG tablet, Take 1 tablet by mouth Every 12 (Twelve) Hours for 30 days. (Patient taking differently: Take 100 mg by mouth 2 (Two) Times a Day.), Disp: 60 tablet, Rfl: 0  •  naproxen (NAPROSYN) 500 MG tablet, , Disp: , Rfl:     Allergies:  Allergies   Allergen Reactions   • Adhesive Tape Hives   • Other      Bandaids, MRSA, SURECLOSE       Family Hx:  Family History   Problem Relation Age of Onset   • Heart disease Mother    • Heart disease Father    • Heart disease Sister    • Heart disease Brother         Social History:  Social History     Socioeconomic History   • Marital status:      Spouse name: wesley dumont   • Number of children: Not on file   • Years of education: Not on file   • Highest education level: Not on file   Tobacco Use   • Smoking status: Current Every Day Smoker     Packs/day: 0.25     Years: 46.00     Pack years: 11.50     Types: Cigarettes   • Smokeless tobacco: Never Used   Substance and Sexual Activity   • Alcohol use: No   • Drug use: Yes     Types: LSD, Marijuana, Methamphetamines   • Sexual activity: Not Currently       Review of Systems  Review of Systems   Constitutional: Negative for activity change and appetite change.   HENT: Negative for congestion and trouble swallowing.    Respiratory: Negative for chest tightness and shortness of breath.    Cardiovascular: Negative for chest pain and palpitations.   Gastrointestinal: Negative for abdominal distention and abdominal pain.   Genitourinary: Negative for difficulty urinating and dysuria.   Musculoskeletal: Negative for arthralgias and myalgias.        Burning right  "calve pain   Skin: Negative for color change and pallor.   Neurological: Negative for dizziness, light-headedness and headaches.   Psychiatric/Behavioral: Negative for agitation and behavioral problems.       Objective:     /78   Pulse 74   Ht 157.5 cm (62\")   Wt 129 kg (284 lb)   LMP  (LMP Unknown)   SpO2 96%   BMI 51.94 kg/m²   Physical Exam  Constitutional:       General: She is not in acute distress.     Appearance: She is well-developed.      Comments: Drowsy/somnolent. Mildy difficult to arouse if patient fell asleep.    HENT:      Head: Normocephalic and atraumatic.      Right Ear: External ear normal.      Left Ear: External ear normal.      Nose: Nose normal.   Eyes:      Extraocular Movements: Extraocular movements intact.      Pupils: Pupils are equal, round, and reactive to light.   Cardiovascular:      Rate and Rhythm: Normal rate and regular rhythm.      Heart sounds: Normal heart sounds. No murmur. No friction rub. No gallop.    Pulmonary:      Effort: Pulmonary effort is normal. No respiratory distress.      Breath sounds: Normal breath sounds. No wheezing or rales.   Abdominal:      General: Bowel sounds are normal. There is no distension.      Palpations: Abdomen is soft.      Tenderness: There is no abdominal tenderness.   Musculoskeletal:         General: Normal range of motion.      Cervical back: Normal range of motion and neck supple.      Right lower leg: No edema.      Left lower leg: No edema.   Skin:     General: Skin is warm.      Findings: No erythema.   Neurological:      Mental Status: She is oriented to person, place, and time. Mental status is at baseline.   Psychiatric:         Mood and Affect: Mood normal.         Behavior: Behavior normal.          Assessment/Plan:     Diagnoses and all orders for this visit:    1. Uncontrolled type 2 diabetes mellitus with diabetic polyneuropathy, with long-term current use of insulin (CMS/[facility]) (Primary)  -     DULoxetine (Cymbalta) " 20 MG capsule; Take 1 capsule by mouth Daily.  Dispense: 30 capsule; Refill: 0  -     insulin aspart (novoLOG FLEXPEN) 100 UNIT/ML solution pen-injector sc pen; Inject 5 Units under the skin into the appropriate area as directed 3 (Three) Times a Day With Meals.  Dispense: 1 pen; Refill: 1  -     Insulin Glargine (Lantus SoloStar) 100 UNIT/ML injection pen; Inject 100 Units under the skin into the appropriate area as directed Every 12 (Twelve) Hours.  Dispense: 60 mL; Refill: 11  -     Hemoglobin A1c; Future    2. Other long term (current) drug therapy   -     Urine Drug Screen - Urine, Clean Catch; Future  -     Gabapentin level; Future    3. H/O burning pain in leg    Other orders  -     Cancel: gabapentin (NEURONTIN) 800 MG tablet  Dispense: 90 tablet; Refill: 0        Patient with uncontrolled diabetes after had to stop Victoza due to pancreatitis, and Metformin due to worsening renal function.  She is currently on Lantus 100 units twice daily, insulin aspart 5 units 3 times daily with meals, and Jardiance 25 mg daily.  However, she is noncompliant and has an increasing A1c.  Encouraged to become compliant for improved management of diabetes in addition to improving diet, and starting to exercise. Cymbalta is helping with neuropathy. Gabapentin is helping as well at 800 mg 3 times daily, Mamadou reviewed, request #[id number] and last filled on February 16, 2021.  Will obtain UDS along with gabapentin level and fill when appropriate as long as urine studies are compliant. Due to her daytime drowsiness encouraged to use CPAP and monitor for improvement.  We will not refer to neurology as she only leans when she sits and believe this is more preference than actual neurological disorder.  Her falls are only when she leans forward and believe this is secondary to loss of balance. Burning pain in back of right leg recommended following up with vascular surgery as she does have stent placement.    · Rx changes:  None  · Patient Education: Encouraged medication compliance and to not be ashamed of uncontrolled diabetes as she is gone through some difficult moments he recently after multiple hospitalizations for Covid, and pancreatitis.  · Compliance at present is estimated to be good.   · Efforts to improve compliance (if necessary) will be directed at Unnecessary at this time.     Follow-up:     Return in about 4 weeks (around 3/25/2021) for Recheck.    Preventative:  Health Maintenance   Topic Date Due   • ZOSTER VACCINE (1 of 2) 03/10/2009   • ANNUAL WELLNESS VISIT  08/24/2016   • DIABETIC EYE EXAM  08/24/2016   • URINE MICROALBUMIN  10/05/2018   • DIABETIC FOOT EXAM  12/26/2019   • PAP SMEAR  01/04/2020   • LUNG CANCER SCREENING  01/10/2021   • COLONOSCOPY  05/02/2021   • HEMOGLOBIN A1C  06/04/2021   • LIPID PANEL  06/24/2021   • MAMMOGRAM  01/10/2022   • TDAP/TD VACCINES (3 - Td) 11/10/2024   • HEPATITIS C SCREENING  Completed   • Pneumococcal Vaccine 0-64  Completed   • INFLUENZA VACCINE  Completed   • MENINGOCOCCAL VACCINE  Aged Out       Recommended: none  Vaccine Counseling: N/A    Weight  -Class: Obese Class III extreme obesity: > or equal to 40kg/m2  -Patient's Body mass index is 51.94 kg/m². BMI is above normal parameters. Recommendations include: exercise counseling and nutrition counseling.   eat more fruits and vegetables, decrease soda or juice intake, increase water intake and increase physical activity    Alcohol use:  reports no history of alcohol use.  Nicotine status  reports that she has been smoking cigarettes. She has a 11.50 pack-year smoking history. She has never used smokeless tobacco.    Goals     • Fasting Blood Glucose       Barriers: compliance with diet      • Quit smoking / using tobacco           RISK SCORE: 4          This document has been electronically signed by Nirmal Calvillo MD on February 25, 2021 19:00 CST

## 2021-03-03 ENCOUNTER — OFFICE VISIT (OUTPATIENT)
Dept: FAMILY MEDICINE CLINIC | Facility: CLINIC | Age: 61
End: 2021-03-03

## 2021-03-03 VITALS
BODY MASS INDEX: 52.26 KG/M2 | TEMPERATURE: 97.3 F | WEIGHT: 284 LBS | DIASTOLIC BLOOD PRESSURE: 78 MMHG | OXYGEN SATURATION: 94 % | HEIGHT: 62 IN | SYSTOLIC BLOOD PRESSURE: 128 MMHG | HEART RATE: 87 BPM

## 2021-03-03 DIAGNOSIS — L02.01 FACIAL ABSCESS: Primary | ICD-10-CM

## 2021-03-03 PROCEDURE — 99213 OFFICE O/P EST LOW 20 MIN: CPT | Performed by: STUDENT IN AN ORGANIZED HEALTH CARE EDUCATION/TRAINING PROGRAM

## 2021-03-03 RX ORDER — GABAPENTIN 800 MG/1
TABLET ORAL
Qty: 90 TABLET | Refills: 0 | Status: CANCELLED | OUTPATIENT
Start: 2021-03-03

## 2021-03-03 RX ORDER — AMOXICILLIN AND CLAVULANATE POTASSIUM 875; 125 MG/1; MG/1
1 TABLET, FILM COATED ORAL DAILY
Qty: 10 TABLET | Refills: 0 | Status: SHIPPED | OUTPATIENT
Start: 2021-03-03 | End: 2021-05-21 | Stop reason: HOSPADM

## 2021-03-03 NOTE — PROGRESS NOTES
Subjective   Yamileth Slater is a 60 y.o. female who presents for initial evaluation  for left sided facial pain, swelling x3 days. Denies any recent trauma, dry skin. Endorses left sided tooth pain and loss of 1 tooth. Was supposed to see dentist but missed appointment due to going to ED for a arm fracture.       Past Medical Hx:  Past Medical History:   Diagnosis Date   • Anxiety    • Carotid artery stenosis    • Chronic obstructive lung disease (CMS/HCC)    • CKD (chronic kidney disease) stage 4, GFR 15-29 ml/min (CMS/[facility])    • Colonic polyp    • Coronary arteriosclerosis    • Diabetes mellitus (CMS/HCC)    • Diabetic neuropathy (CMS/HCC)    • GERD (gastroesophageal reflux disease)    • Hypercholesterolemia    • Hypertension    • Morbid obesity (CMS/[facility])    • Nephrolithiasis    • Peripheral vascular disease (CMS/HCC)    • Sleep apnea    • Substance abuse (CMS/[facility])    • Vitamin D deficiency        Past Surgical Hx:  Past Surgical History:   Procedure Laterality Date   • CARDIAC CATHETERIZATION N/A 7/14/2020   • CAROTID STENT Left    • COLONOSCOPY     • CORONARY ARTERY BYPASS GRAFT     • CYSTOSCOPY BLADDER STONE LITHOTRIPSY Bilateral        Health Maintenance:  Health Maintenance   Topic Date Due   • ZOSTER VACCINE (1 of 2) 03/10/2009   • ANNUAL WELLNESS VISIT  08/24/2016   • DIABETIC EYE EXAM  08/24/2016   • URINE MICROALBUMIN  10/05/2018   • DIABETIC FOOT EXAM  12/26/2019   • PAP SMEAR  01/04/2020   • LUNG CANCER SCREENING  01/10/2021   • COLONOSCOPY  05/02/2021   • LIPID PANEL  06/24/2021   • HEMOGLOBIN A1C  08/25/2021   • MAMMOGRAM  01/10/2022   • TDAP/TD VACCINES (3 - Td) 11/10/2024   • HEPATITIS C SCREENING  Completed   • Pneumococcal Vaccine 0-64  Completed   • INFLUENZA VACCINE  Completed   • MENINGOCOCCAL VACCINE  Aged Out       Current Meds:    Current Outpatient Medications:   •  albuterol sulfate  (90 Base) MCG/ACT inhaler, INHALE 2 PUFFS EVERY 4 (FOUR) HOURS AS NEEDED  FOR WHEEZING., Disp: 18 g, Rfl: 1  •  atorvastatin (LIPITOR) 40 MG tablet, Take 1 tablet by mouth Daily., Disp: 90 tablet, Rfl: 0  •  azithromycin (ZITHROMAX) 250 MG tablet, , Disp: , Rfl:   •  Blood Glucose Monitoring Suppl ([employer] Blood Glucose Meter) w/Device kit, 1 each 3 (Three) Times a Day., Disp: 1 kit, Rfl: 3  •  cetirizine (zyrTEC) 10 MG tablet, Take 1 tablet by mouth Daily., Disp: 30 tablet, Rfl: 3  •  clopidogrel (PLAVIX) 75 MG tablet, Take 1 tablet by mouth Daily., Disp: 30 tablet, Rfl: 5  •  Continuous Blood Gluc  (Dexcom G6 ) device, 1 each Continuous., Disp: 1 each, Rfl: 0  •  Continuous Blood Gluc Sensor (FreeStyle Jade 2 Sensor) misc, 1 each Every 14 (Fourteen) Days., Disp: 2 each, Rfl: 11  •  Continuous Blood Gluc Transmit (Dexcom G6 Transmitter) misc, 1 each Every 3 (Three) Months., Disp: 1 each, Rfl: 3  •  cyclobenzaprine (FLEXERIL) 10 MG tablet, Take 1 tablet by mouth 2 (Two) Times a Day As Needed for Muscle Spasms., Disp: 60 tablet, Rfl: 5  •  DULoxetine (Cymbalta) 20 MG capsule, Take 1 capsule by mouth Daily., Disp: 30 capsule, Rfl: 0  •  EASY TOUCH PEN NEEDLES 31G X 8 MM misc, , Disp: , Rfl:   •  ferrous sulfate (FeroSul) 325 (65 FE) MG tablet, Take 1 tablet by mouth Daily With Breakfast., Disp: 30 tablet, Rfl: 3  •  gabapentin (NEURONTIN) 800 MG tablet, TAKE ONE TABLET BY MOUTH THREE TIMES A DAY FOR NEUROPATHY, Disp: 90 tablet, Rfl: 0  •  glucose blood test strip, Use as instructed, Disp: 100 each, Rfl: 12  •  insulin aspart (novoLOG FLEXPEN) 100 UNIT/ML solution pen-injector sc pen, Inject 5 Units under the skin into the appropriate area as directed 3 (Three) Times a Day With Meals., Disp: 1 pen, Rfl: 1  •  Insulin Glargine (Lantus SoloStar) 100 UNIT/ML injection pen, Inject 100 Units under the skin into the appropriate area as directed Every 12 (Twelve) Hours., Disp: 60 mL, Rfl: 11  •  Insulin Pen Needle (NovoFine) 30G X 8 MM misc, As directed 4 times daily, Disp: 100 each,  Rfl: 11  •  isosorbide mononitrate (IMDUR) 30 MG 24 hr tablet, Take 30 mg by mouth Daily., Disp: , Rfl:   •  Jardiance 25 MG tablet, TAKE ONE TABLET BY MOUTH DAILY ., Disp: 30 tablet, Rfl: 5  •  lidocaine (LIDODERM) 5 %, , Disp: , Rfl:   •  lisinopril (PRINIVIL,ZESTRIL) 10 MG tablet, Take 1 tablet by mouth Daily., Disp: 30 tablet, Rfl: 5  •  methylPREDNISolone (MEDROL) 4 MG dose pack, , Disp: , Rfl:   •  montelukast (SINGULAIR) 10 MG tablet, Take 1 tablet by mouth Every Night., Disp: 30 tablet, Rfl: 5  •  naproxen (NAPROSYN) 500 MG tablet, , Disp: , Rfl:   •  nitroglycerin (NITROSTAT) 0.4 MG SL tablet, Place 1 tablet under the tongue As Needed for Chest Pain. Take no more than 3 doses in 15 minutes., Disp: 30 tablet, Rfl: 3  •  nystatin (MYCOSTATIN) 790720 UNIT/GM powder, Apply  topically to the appropriate area as directed 3 (Three) Times a Day., Disp: 15 g, Rfl: 1  •  ondansetron ODT (Zofran ODT) 4 MG disintegrating tablet, Place 1 tablet on the tongue Every 8 (Eight) Hours As Needed for Nausea or Vomiting., Disp: 30 tablet, Rfl: 0  •  oxybutynin XL (DITROPAN-XL) 5 MG 24 hr tablet, Take 5 mg by mouth Daily., Disp: , Rfl:   •  pantoprazole (PROTONIX) 40 MG EC tablet, Take 1 tablet by mouth Every Night., Disp: 30 tablet, Rfl: 5  •  potassium chloride (MICRO-K) 10 MEQ CR capsule, Take 1 capsule by mouth Daily., Disp: 30 capsule, Rfl: 5  •  promethazine (PHENERGAN) 25 MG tablet, Take 1 tablet by mouth Every 6 (Six) Hours As Needed for Nausea or Vomiting., Disp: 30 tablet, Rfl: 0  •  sodium bicarbonate 325 MG tablet, Take 1 tablet by mouth 2 (Two) Times a Day., Disp: 60 tablet, Rfl: 5  •  amoxicillin-clavulanate (Augmentin) 875-125 MG per tablet, Take 1 tablet by mouth Daily., Disp: 10 tablet, Rfl: 0  •  metoprolol tartrate (LOPRESSOR) 100 MG tablet, Take 1 tablet by mouth Every 12 (Twelve) Hours for 30 days. (Patient taking differently: Take 100 mg by mouth 2 (Two) Times a Day.), Disp: 60 tablet, Rfl:  "0    Allergies:  Adhesive tape and Other    Family Hx:  Family History   Problem Relation Age of Onset   • Heart disease Mother    • Heart disease Father    • Heart disease Sister    • Heart disease Brother         Social History:  Social History     Socioeconomic History   • Marital status:      Spouse name: wesley dumont   • Number of children: Not on file   • Years of education: Not on file   • Highest education level: Not on file   Tobacco Use   • Smoking status: Light Tobacco Smoker     Packs/day: 0.25     Years: 46.00     Pack years: 11.50     Types: Cigarettes   • Smokeless tobacco: Never Used   • Tobacco comment: only smoking 5 a day    Substance and Sexual Activity   • Alcohol use: No   • Drug use: Yes     Types: LSD, Marijuana, Methamphetamines   • Sexual activity: Not Currently       Review of Systems  Review of Systems   Constitutional: Negative for activity change, chills, diaphoresis and fever.   HENT: Negative for dental problem, drooling, ear discharge, ear pain, facial swelling, mouth sores, nosebleeds, rhinorrhea, sinus pressure, sinus pain, sneezing and sore throat.    Eyes: Negative for pain, discharge and visual disturbance.   Respiratory: Negative for apnea, cough, shortness of breath, wheezing and stridor.    Cardiovascular: Negative for chest pain, palpitations and leg swelling.   Gastrointestinal: Negative for abdominal distention, abdominal pain, constipation, diarrhea, nausea and vomiting.   Genitourinary: Negative for dysuria, frequency and urgency.   Musculoskeletal: Negative for arthralgias and back pain.   Skin: Positive for color change and rash. Negative for wound.   Neurological: Negative for dizziness, facial asymmetry, light-headedness and headaches.   Psychiatric/Behavioral: Negative for agitation and decreased concentration. The patient is not nervous/anxious.             Objective:     /78   Pulse 87   Temp 97.3 °F (36.3 °C)   Ht 157.5 cm (62\")   Wt 129 kg (284 " lb)   LMP  (LMP Unknown)   SpO2 94%   BMI 51.94 kg/m²       Physical Exam  Constitutional:       Appearance: She is well-developed.   HENT:      Head: Normocephalic and atraumatic.      Right Ear: External ear normal.      Left Ear: External ear normal.      Nose: Nose normal.      Mouth/Throat:      Pharynx: No oropharyngeal exudate.      Comments: Discoloration of tooth on left side jaw. Erythema of inner cheek  Eyes:      General: No scleral icterus.        Right eye: No discharge.         Left eye: No discharge.      Conjunctiva/sclera: Conjunctivae normal.      Pupils: Pupils are equal, round, and reactive to light.   Neck:      Musculoskeletal: Normal range of motion and neck supple.      Vascular: No JVD.   Cardiovascular:      Rate and Rhythm: Normal rate and regular rhythm.      Heart sounds: Normal heart sounds. No murmur. No friction rub. No gallop.    Pulmonary:      Effort: Pulmonary effort is normal. No respiratory distress.      Breath sounds: Normal breath sounds. No stridor. No wheezing or rales.   Abdominal:      General: Bowel sounds are normal. There is no distension.      Palpations: Abdomen is soft.      Tenderness: There is no abdominal tenderness.   Musculoskeletal: Normal range of motion.         General: No tenderness or deformity.   Skin:     General: Skin is warm and dry.      Capillary Refill: Capillary refill takes less than 2 seconds.      Coloration: Skin is not pale.      Findings: Erythema (left cheek) present. No rash.   Neurological:      Mental Status: She is alert and oriented to person, place, and time.   Psychiatric:         Behavior: Behavior normal.         Assessment/Plan:     Diagnoses and all orders for this visit:    1. Facial abscess (Primary)  -     amoxicillin-clavulanate (Augmentin) 875-125 MG per tablet; Take 1 tablet by mouth Daily.  Dispense: 10 tablet; Refill: 0    Other orders  -     Cancel: gabapentin (NEURONTIN) 800 MG tablet  Dispense: 90 tablet; Refill:  0     Will prescribe Augmentin for facial abscess. Have advised Pt she needs to make and keep appointment with dentist and need for dental xray to evaluate extent of current condition. Will follow up with Pt in 10 days to assess for treatment response.    Follow-up:     Return in about 10 days (around 3/13/2021).    Goals     • Fasting Blood Glucose       Barriers: compliance with diet      • Quit smoking / using tobacco           Preventative:    Vaccines Recommended at this visit:   No Vaccines recommended today. Patient is up to date on all vaccines.     Vaccines Received at this visit:  No Vaccines recommended today. Patient is up to date on all vaccines.     Screenings Recommended at this visit:  No Screenings offered today. Patient is up to date on all screenings at this time.     Screenings Ordered at this visit:  No screenings were offered today. Patient is up to date on all screenings.     Smoking Status:  Patient is current smoker. Patient is not interested in smoking cessation.    Alcohol Intake:  Patient does not drink    Patient's Body mass index is 51.94 kg/m². BMI is above normal parameters. Recommendations include: exercise counseling and nutrition counseling.         RISK SCORE: 3          This document has been electronically signed by Florentino Altamirano MD on March 3, 2021 16:42 CST

## 2021-03-08 NOTE — PROGRESS NOTES
I have reviewed the notes, assessments, and/or procedures performed by Dr. Altamirano, I concur with her/his documentation and assessment and plan for Yamileth Slater.       This document has been electronically signed by Kit Brar MD on March 8, 2021 14:09 CST

## 2021-03-10 RX ORDER — LIDOCAINE 50 MG/G
PATCH TOPICAL
Qty: 30 PATCH | Refills: 3 | Status: SHIPPED | OUTPATIENT
Start: 2021-03-10 | End: 2021-10-08

## 2021-03-26 ENCOUNTER — OFFICE VISIT (OUTPATIENT)
Dept: FAMILY MEDICINE CLINIC | Facility: CLINIC | Age: 61
End: 2021-03-26

## 2021-03-26 ENCOUNTER — LAB (OUTPATIENT)
Dept: LAB | Facility: HOSPITAL | Age: 61
End: 2021-03-26

## 2021-03-26 VITALS
SYSTOLIC BLOOD PRESSURE: 134 MMHG | BODY MASS INDEX: 50.77 KG/M2 | HEART RATE: 67 BPM | TEMPERATURE: 97.3 F | WEIGHT: 275.9 LBS | HEIGHT: 62 IN | OXYGEN SATURATION: 95 % | DIASTOLIC BLOOD PRESSURE: 76 MMHG

## 2021-03-26 DIAGNOSIS — IMO0002 UNCONTROLLED TYPE 2 DIABETES MELLITUS WITH DIABETIC POLYNEUROPATHY, WITH LONG-TERM CURRENT USE OF INSULIN: Primary | ICD-10-CM

## 2021-03-26 DIAGNOSIS — D50.8 OTHER IRON DEFICIENCY ANEMIA: ICD-10-CM

## 2021-03-26 DIAGNOSIS — E11.69 DIABETES MELLITUS TYPE 2 IN OBESE (HCC): ICD-10-CM

## 2021-03-26 DIAGNOSIS — J44.0 CHRONIC OBSTRUCTIVE PULMONARY DISEASE WITH ACUTE LOWER RESPIRATORY INFECTION (HCC): ICD-10-CM

## 2021-03-26 DIAGNOSIS — L02.01 FACIAL ABSCESS: ICD-10-CM

## 2021-03-26 DIAGNOSIS — E66.9 DIABETES MELLITUS TYPE 2 IN OBESE (HCC): ICD-10-CM

## 2021-03-26 DIAGNOSIS — Z51.81 ENCOUNTER FOR THERAPEUTIC DRUG LEVEL MONITORING: Primary | ICD-10-CM

## 2021-03-26 PROCEDURE — G0480 DRUG TEST DEF 1-7 CLASSES: HCPCS

## 2021-03-26 PROCEDURE — 99213 OFFICE O/P EST LOW 20 MIN: CPT | Performed by: STUDENT IN AN ORGANIZED HEALTH CARE EDUCATION/TRAINING PROGRAM

## 2021-03-26 RX ORDER — CLOPIDOGREL BISULFATE 75 MG/1
75 TABLET ORAL DAILY
Qty: 30 TABLET | Refills: 5 | Status: SHIPPED | OUTPATIENT
Start: 2021-03-26

## 2021-03-26 RX ORDER — CETIRIZINE HYDROCHLORIDE 10 MG/1
10 TABLET ORAL DAILY
Qty: 30 TABLET | Refills: 3 | Status: SHIPPED | OUTPATIENT
Start: 2021-03-26 | End: 2022-01-26

## 2021-03-26 RX ORDER — OXYBUTYNIN CHLORIDE 5 MG/1
5 TABLET, EXTENDED RELEASE ORAL DAILY
Qty: 90 TABLET | Refills: 1 | Status: SHIPPED | OUTPATIENT
Start: 2021-03-26 | End: 2022-04-26 | Stop reason: SDUPTHER

## 2021-03-26 RX ORDER — AMOXICILLIN AND CLAVULANATE POTASSIUM 875; 125 MG/1; MG/1
1 TABLET, FILM COATED ORAL DAILY
Qty: 10 TABLET | Refills: 0 | Status: CANCELLED | OUTPATIENT
Start: 2021-03-26

## 2021-03-26 RX ORDER — CYCLOBENZAPRINE HCL 10 MG
10 TABLET ORAL 2 TIMES DAILY PRN
Qty: 60 TABLET | Refills: 5 | Status: SHIPPED | OUTPATIENT
Start: 2021-03-26 | End: 2022-01-20 | Stop reason: HOSPADM

## 2021-03-26 RX ORDER — POTASSIUM CHLORIDE 750 MG/1
10 CAPSULE, EXTENDED RELEASE ORAL DAILY
Qty: 30 CAPSULE | Refills: 5 | Status: SHIPPED | OUTPATIENT
Start: 2021-03-26 | End: 2021-06-09 | Stop reason: HOSPADM

## 2021-03-26 RX ORDER — INSULIN GLARGINE 100 [IU]/ML
100 INJECTION, SOLUTION SUBCUTANEOUS EVERY 12 HOURS
Qty: 60 ML | Refills: 11 | Status: ON HOLD | OUTPATIENT
Start: 2021-03-26 | End: 2021-04-12 | Stop reason: SDUPTHER

## 2021-03-26 RX ORDER — FERROUS SULFATE 325(65) MG
1 TABLET ORAL
Qty: 30 TABLET | Refills: 3 | Status: SHIPPED | OUTPATIENT
Start: 2021-03-26 | End: 2022-03-17 | Stop reason: HOSPADM

## 2021-03-26 RX ORDER — ONDANSETRON 4 MG/1
4 TABLET, ORALLY DISINTEGRATING ORAL EVERY 8 HOURS PRN
Qty: 30 TABLET | Refills: 0 | Status: SHIPPED | OUTPATIENT
Start: 2021-03-26 | End: 2022-12-23 | Stop reason: HOSPADM

## 2021-03-26 RX ORDER — AZITHROMYCIN 250 MG/1
TABLET, FILM COATED ORAL
Status: CANCELLED | OUTPATIENT
Start: 2021-03-26

## 2021-03-26 RX ORDER — MONTELUKAST SODIUM 10 MG/1
10 TABLET ORAL NIGHTLY
Qty: 30 TABLET | Refills: 5 | Status: SHIPPED | OUTPATIENT
Start: 2021-03-26 | End: 2022-04-26 | Stop reason: SDUPTHER

## 2021-03-26 RX ORDER — BLOOD-GLUCOSE METER
1 EACH MISCELLANEOUS 3 TIMES DAILY
Qty: 1 KIT | Refills: 3 | Status: CANCELLED | OUTPATIENT
Start: 2021-03-26

## 2021-03-26 RX ORDER — NAPROXEN 500 MG/1
TABLET ORAL
Status: CANCELLED | OUTPATIENT
Start: 2021-03-26

## 2021-03-26 RX ORDER — PROCHLORPERAZINE 25 MG/1
1 SUPPOSITORY RECTAL
Qty: 1 EACH | Refills: 3 | Status: CANCELLED | OUTPATIENT
Start: 2021-03-26

## 2021-03-26 RX ORDER — ISOSORBIDE MONONITRATE 30 MG/1
30 TABLET, EXTENDED RELEASE ORAL DAILY
Qty: 90 TABLET | Refills: 2 | Status: SHIPPED | OUTPATIENT
Start: 2021-03-26 | End: 2021-08-16

## 2021-03-26 RX ORDER — PROMETHAZINE HYDROCHLORIDE 25 MG/1
25 TABLET ORAL EVERY 6 HOURS PRN
Qty: 30 TABLET | Refills: 0 | Status: ON HOLD | OUTPATIENT
Start: 2021-03-26 | End: 2021-06-03

## 2021-03-26 RX ORDER — NYSTATIN 100000 [USP'U]/G
POWDER TOPICAL 3 TIMES DAILY
Qty: 15 G | Refills: 1 | Status: SHIPPED | OUTPATIENT
Start: 2021-03-26 | End: 2021-10-01 | Stop reason: SDUPTHER

## 2021-03-26 RX ORDER — PROCHLORPERAZINE 25 MG/1
1 SUPPOSITORY RECTAL CONTINUOUS
Qty: 1 EACH | Refills: 0 | Status: CANCELLED | OUTPATIENT
Start: 2021-03-26

## 2021-03-26 RX ORDER — DULOXETIN HYDROCHLORIDE 20 MG/1
20 CAPSULE, DELAYED RELEASE ORAL DAILY
Qty: 30 CAPSULE | Refills: 0 | Status: SHIPPED | OUTPATIENT
Start: 2021-03-26 | End: 2021-07-08

## 2021-03-26 RX ORDER — ATORVASTATIN CALCIUM 40 MG/1
40 TABLET, FILM COATED ORAL DAILY
Qty: 90 TABLET | Refills: 0 | Status: SHIPPED | OUTPATIENT
Start: 2021-03-26 | End: 2021-10-08

## 2021-03-26 RX ORDER — PANTOPRAZOLE SODIUM 40 MG/1
40 TABLET, DELAYED RELEASE ORAL NIGHTLY
Qty: 30 TABLET | Refills: 5 | Status: SHIPPED | OUTPATIENT
Start: 2021-03-26 | End: 2022-04-21

## 2021-03-26 RX ORDER — SODIUM BICARBONATE 325 MG/1
325 TABLET ORAL 2 TIMES DAILY
Qty: 60 TABLET | Refills: 5 | Status: ON HOLD | OUTPATIENT
Start: 2021-03-26 | End: 2021-04-12 | Stop reason: SDUPTHER

## 2021-03-26 RX ORDER — EMPAGLIFLOZIN 25 MG/1
1 TABLET, FILM COATED ORAL DAILY
Qty: 90 TABLET | Refills: 5 | Status: SHIPPED | OUTPATIENT
Start: 2021-03-26 | End: 2021-06-09 | Stop reason: HOSPADM

## 2021-03-26 RX ORDER — ALBUTEROL SULFATE 90 UG/1
2 AEROSOL, METERED RESPIRATORY (INHALATION) EVERY 4 HOURS PRN
Qty: 18 G | Refills: 1 | Status: SHIPPED | OUTPATIENT
Start: 2021-03-26

## 2021-03-26 RX ORDER — PEN NEEDLE, DIABETIC 31 GX5/16"
NEEDLE, DISPOSABLE MISCELLANEOUS
Status: CANCELLED | OUTPATIENT
Start: 2021-03-26

## 2021-03-26 RX ORDER — NITROGLYCERIN 0.4 MG/1
0.4 TABLET SUBLINGUAL AS NEEDED
Qty: 30 TABLET | Refills: 3 | Status: SHIPPED | OUTPATIENT
Start: 2021-03-26 | End: 2021-05-13

## 2021-03-26 RX ORDER — GABAPENTIN 800 MG/1
800 TABLET ORAL 3 TIMES DAILY
Qty: 90 TABLET | Refills: 0 | Status: SHIPPED | OUTPATIENT
Start: 2021-03-26 | End: 2021-05-04 | Stop reason: SDUPTHER

## 2021-03-26 NOTE — PROGRESS NOTES
Family Medicine Residency  Nirmal Calvillo MD    Subjective:     Yamileth Slater is a 61 y.o. female who presents for DM follow up. She was previously well controlled but due to pancreatitis and worsening renal function, respectively. She was feeling unmotivated and discontinued taking her medications as prescribed and eating a healthy diet. A1c increased to 11 on 2/25/21  from 9.5 on 12/4/20. She states has started retaking her medication as prescribed and improving her diet.     Has suffered multiple mechanical falls one resulting in right wrist fracture being managed by orthopedics in [place]. Dr. Delvis Nava.     Continue to have burning in right leg and will follow up with vascular surgery as has stent placed in lower extremity.     Lesion on face improving    She is having bladder pressure with intermittent dysuria and right-sided flank pain.  She denies any fever.  This is been going on for 3 to 4 weeks which coincides with when she fell and broke her right wrist.    The following portions of the patient's history were reviewed and updated as appropriate: allergies, current medications, past family history, past medical history, past social history, past surgical history and problem list.    Past Medical Hx:  Past Medical History:   Diagnosis Date   • Anxiety    • Carotid artery stenosis    • Chronic obstructive lung disease (CMS/HCC)    • CKD (chronic kidney disease) stage 4, GFR 15-29 ml/min (CMS/HCC)    • Colonic polyp    • Coronary arteriosclerosis    • Diabetes mellitus (CMS/HCC)    • Diabetic neuropathy (CMS/HCC)    • GERD (gastroesophageal reflux disease)    • Hypercholesterolemia    • Hypertension    • Morbid obesity (CMS/HCC)    • Nephrolithiasis    • Peripheral vascular disease (CMS/HCC)    • Sleep apnea    • Substance abuse (CMS/HCC)    • Vitamin D deficiency        Past Surgical Hx:  Past Surgical History:   Procedure Laterality Date   • CARDIAC CATHETERIZATION N/A 7/14/2020   •  CAROTID STENT Left    • COLONOSCOPY     • CORONARY ARTERY BYPASS GRAFT     • CYSTOSCOPY BLADDER STONE LITHOTRIPSY Bilateral        Current Meds:    Current Outpatient Medications:   •  amoxicillin-clavulanate (Augmentin) 875-125 MG per tablet, Take 1 tablet by mouth Daily., Disp: 10 tablet, Rfl: 0  •  azithromycin (ZITHROMAX) 250 MG tablet, , Disp: , Rfl:   •  Blood Glucose Monitoring Suppl ([employer] Blood Glucose Meter) w/Device kit, 1 each 3 (Three) Times a Day., Disp: 1 kit, Rfl: 3  •  Continuous Blood Gluc  (Dexcom G6 ) device, 1 each Continuous., Disp: 1 each, Rfl: 0  •  Continuous Blood Gluc Sensor (FreeStyle Jade 2 Sensor) misc, 1 each Every 14 (Fourteen) Days., Disp: 2 each, Rfl: 11  •  Continuous Blood Gluc Transmit (Dexcom G6 Transmitter) misc, 1 each Every 3 (Three) Months., Disp: 1 each, Rfl: 3  •  EASY TOUCH PEN NEEDLES 31G X 8 MM misc, , Disp: , Rfl:   •  glucose blood test strip, Use as instructed, Disp: 100 each, Rfl: 12  •  lidocaine (LIDODERM) 5 %, PLACE 1 PATCH ON THE SKIN AS DIRECTED BY PROVIDER DAILY. REMOVE & DISCARD PATCH WITHIN 12 HOURS OR AS DIRECTED BY MD, Disp: 30 patch, Rfl: 3  •  lisinopril (PRINIVIL,ZESTRIL) 10 MG tablet, Take 1 tablet by mouth Daily., Disp: 30 tablet, Rfl: 5  •  methylPREDNISolone (MEDROL) 4 MG dose pack, , Disp: , Rfl:   •  albuterol sulfate  (90 Base) MCG/ACT inhaler, Inhale 2 puffs Every 4 (Four) Hours As Needed for Wheezing., Disp: 18 g, Rfl: 1  •  atorvastatin (LIPITOR) 40 MG tablet, Take 1 tablet by mouth Daily., Disp: 90 tablet, Rfl: 0  •  cetirizine (zyrTEC) 10 MG tablet, Take 1 tablet by mouth Daily., Disp: 30 tablet, Rfl: 3  •  clopidogrel (PLAVIX) 75 MG tablet, Take 1 tablet by mouth Daily., Disp: 30 tablet, Rfl: 5  •  cyclobenzaprine (FLEXERIL) 10 MG tablet, Take 1 tablet by mouth 2 (Two) Times a Day As Needed for Muscle Spasms., Disp: 60 tablet, Rfl: 5  •  DULoxetine (Cymbalta) 20 MG capsule, Take 1 capsule by mouth Daily., Disp: 30  capsule, Rfl: 0  •  Empagliflozin (Jardiance) 25 MG tablet, Take 25 mg by mouth Daily., Disp: 90 tablet, Rfl: 5  •  ferrous sulfate (FeroSul) 325 (65 FE) MG tablet, Take 1 tablet by mouth Daily With Breakfast., Disp: 30 tablet, Rfl: 3  •  gabapentin (NEURONTIN) 800 MG tablet, Take 1 tablet by mouth 3 (Three) Times a Day., Disp: 90 tablet, Rfl: 0  •  insulin aspart (novoLOG FLEXPEN) 100 UNIT/ML solution pen-injector sc pen, Inject 5 Units under the skin into the appropriate area as directed 3 (Three) Times a Day With Meals., Disp: 1 pen, Rfl: 1  •  Insulin Glargine (Lantus SoloStar) 100 UNIT/ML injection pen, Inject 100 Units under the skin into the appropriate area as directed Every 12 (Twelve) Hours., Disp: 60 mL, Rfl: 11  •  Insulin Pen Needle (NovoFine) 30G X 8 MM misc, As directed 4 times daily, Disp: 100 each, Rfl: 11  •  isosorbide mononitrate (IMDUR) 30 MG 24 hr tablet, Take 1 tablet by mouth Daily., Disp: 90 tablet, Rfl: 2  •  metoprolol tartrate (LOPRESSOR) 100 MG tablet, Take 1 tablet by mouth Every 12 (Twelve) Hours for 30 days. (Patient taking differently: Take 100 mg by mouth 2 (Two) Times a Day.), Disp: 60 tablet, Rfl: 0  •  montelukast (SINGULAIR) 10 MG tablet, Take 1 tablet by mouth Every Night., Disp: 30 tablet, Rfl: 5  •  nitroglycerin (NITROSTAT) 0.4 MG SL tablet, Place 1 tablet under the tongue As Needed for Chest Pain. Take no more than 3 doses in 15 minutes., Disp: 30 tablet, Rfl: 3  •  nystatin (MYCOSTATIN) 155351 UNIT/GM powder, Apply  topically to the appropriate area as directed 3 (Three) Times a Day., Disp: 15 g, Rfl: 1  •  ondansetron ODT (Zofran ODT) 4 MG disintegrating tablet, Place 1 tablet on the tongue Every 8 (Eight) Hours As Needed for Nausea or Vomiting., Disp: 30 tablet, Rfl: 0  •  oxybutynin XL (DITROPAN-XL) 5 MG 24 hr tablet, Take 1 tablet by mouth Daily., Disp: 90 tablet, Rfl: 1  •  pantoprazole (PROTONIX) 40 MG EC tablet, Take 1 tablet by mouth Every Night., Disp: 30 tablet,  Rfl: 5  •  potassium chloride (MICRO-K) 10 MEQ CR capsule, Take 1 capsule by mouth Daily., Disp: 30 capsule, Rfl: 5  •  promethazine (PHENERGAN) 25 MG tablet, Take 1 tablet by mouth Every 6 (Six) Hours As Needed for Nausea or Vomiting., Disp: 30 tablet, Rfl: 0  •  sodium bicarbonate 325 MG tablet, Take 1 tablet by mouth 2 (Two) Times a Day., Disp: 60 tablet, Rfl: 5    Allergies:  Allergies   Allergen Reactions   • Adhesive Tape Hives   • Other      Bandaids, MRSA, SURECLOSE       Family Hx:  Family History   Problem Relation Age of Onset   • Heart disease Mother    • Heart disease Father    • Heart disease Sister    • Heart disease Brother         Social History:  Social History     Socioeconomic History   • Marital status:      Spouse name: wesley dumont   • Number of children: Not on file   • Years of education: Not on file   • Highest education level: Not on file   Tobacco Use   • Smoking status: Light Tobacco Smoker     Packs/day: 0.25     Years: 46.00     Pack years: 11.50     Types: Cigarettes   • Smokeless tobacco: Never Used   • Tobacco comment: only smoking 5 a day    Substance and Sexual Activity   • Alcohol use: No   • Drug use: Yes     Types: LSD, Marijuana, Methamphetamines   • Sexual activity: Not Currently       Review of Systems  Review of Systems   Constitutional: Negative for activity change and appetite change.   HENT: Negative for congestion and trouble swallowing.    Respiratory: Negative for chest tightness and shortness of breath.    Cardiovascular: Negative for chest pain and palpitations.   Gastrointestinal: Negative for abdominal distention and abdominal pain.   Genitourinary: Negative for difficulty urinating and dysuria.   Musculoskeletal: Negative for arthralgias and myalgias.   Skin: Negative for color change and pallor.   Neurological: Negative for dizziness, light-headedness and headaches.        Burning sensation in right lower leg   Psychiatric/Behavioral: Negative for  "agitation and behavioral problems.       Objective:     /76   Pulse 67   Temp 97.3 °F (36.3 °C)   Ht 157.5 cm (62\")   Wt 125 kg (275 lb 14.4 oz)   LMP  (LMP Unknown)   SpO2 95%   BMI 50.46 kg/m²   Physical Exam  Constitutional:       General: She is not in acute distress.     Appearance: She is well-developed.   HENT:      Head: Normocephalic and atraumatic.      Right Ear: External ear normal.      Left Ear: External ear normal.      Nose: Nose normal.   Eyes:      Extraocular Movements: Extraocular movements intact.      Pupils: Pupils are equal, round, and reactive to light.   Cardiovascular:      Rate and Rhythm: Normal rate and regular rhythm.      Heart sounds: Normal heart sounds. No murmur heard.   No friction rub. No gallop.    Pulmonary:      Effort: Pulmonary effort is normal. No respiratory distress.      Breath sounds: Normal breath sounds. No wheezing or rales.   Abdominal:      General: Bowel sounds are normal. There is no distension.      Palpations: Abdomen is soft.      Tenderness: There is no abdominal tenderness.   Musculoskeletal:         General: Normal range of motion.      Cervical back: Normal range of motion and neck supple.      Right lower leg: No edema.      Left lower leg: No edema.   Skin:     General: Skin is warm.      Findings: No erythema.      Comments: Some scaling and flaking on left cheek.  No erythema or wound noted.   Neurological:      Mental Status: She is alert and oriented to person, place, and time. Mental status is at baseline.   Psychiatric:         Mood and Affect: Mood normal.         Behavior: Behavior normal.          Assessment/Plan:     Diagnoses and all orders for this visit:    1. Uncontrolled type 2 diabetes mellitus with diabetic polyneuropathy, with long-term current use of insulin (CMS/[facility]) (Primary)  -     DULoxetine (Cymbalta) 20 MG capsule; Take 1 capsule by mouth Daily.  Dispense: 30 capsule; Refill: 0  -     gabapentin (NEURONTIN) 800 MG " tablet; Take 1 tablet by mouth 3 (Three) Times a Day.  Dispense: 90 tablet; Refill: 0  -     insulin aspart (novoLOG FLEXPEN) 100 UNIT/ML solution pen-injector sc pen; Inject 5 Units under the skin into the appropriate area as directed 3 (Three) Times a Day With Meals.  Dispense: 1 pen; Refill: 1  -     Insulin Glargine (Lantus SoloStar) 100 UNIT/ML injection pen; Inject 100 Units under the skin into the appropriate area as directed Every 12 (Twelve) Hours.  Dispense: 60 mL; Refill: 11    2. Facial abscess    3. Diabetes mellitus type 2 in obese (CMS/[facility])  -     Empagliflozin (Jardiance) 25 MG tablet; Take 25 mg by mouth Daily.  Dispense: 90 tablet; Refill: 5    4. Other iron deficiency anemia  -     ferrous sulfate (FeroSul) 325 (65 FE) MG tablet; Take 1 tablet by mouth Daily With Breakfast.  Dispense: 30 tablet; Refill: 3    5. Chronic obstructive pulmonary disease with acute lower respiratory infection (CMS/[facility])  -     montelukast (SINGULAIR) 10 MG tablet; Take 1 tablet by mouth Every Night.  Dispense: 30 tablet; Refill: 5    Other orders  -     albuterol sulfate  (90 Base) MCG/ACT inhaler; Inhale 2 puffs Every 4 (Four) Hours As Needed for Wheezing.  Dispense: 18 g; Refill: 1  -     Cancel: amoxicillin-clavulanate (Augmentin) 875-125 MG per tablet; Take 1 tablet by mouth Daily.  Dispense: 10 tablet; Refill: 0  -     atorvastatin (LIPITOR) 40 MG tablet; Take 1 tablet by mouth Daily.  Dispense: 90 tablet; Refill: 0  -     Cancel: azithromycin (ZITHROMAX) 250 MG tablet  -     cetirizine (zyrTEC) 10 MG tablet; Take 1 tablet by mouth Daily.  Dispense: 30 tablet; Refill: 3  -     Cancel: Blood Glucose Monitoring Suppl ([employer] Blood Glucose Meter) w/Device kit; 1 each 3 (Three) Times a Day.  Dispense: 1 kit; Refill: 3  -     clopidogrel (PLAVIX) 75 MG tablet; Take 1 tablet by mouth Daily.  Dispense: 30 tablet; Refill: 5  -     Cancel: Continuous Blood Gluc  (Dexcom G6 ) device; 1 each Continuous.   Dispense: 1 each; Refill: 0  -     Cancel: Continuous Blood Gluc Sensor (FreeStyle Jade 2 Sensor) misc; 1 each Every 14 (Fourteen) Days.  Dispense: 2 each; Refill: 11  -     Cancel: Continuous Blood Gluc Transmit (Dexcom G6 Transmitter) misc; 1 each Every 3 (Three) Months.  Dispense: 1 each; Refill: 3  -     cyclobenzaprine (FLEXERIL) 10 MG tablet; Take 1 tablet by mouth 2 (Two) Times a Day As Needed for Muscle Spasms.  Dispense: 60 tablet; Refill: 5  -     Cancel: Easy Touch Pen Needles 31G X 8 MM misc  -     Cancel: glucose blood test strip; Use as instructed  Dispense: 100 each; Refill: 12  -     Insulin Pen Needle (NovoFine) 30G X 8 MM misc; As directed 4 times daily  Dispense: 100 each; Refill: 11  -     isosorbide mononitrate (IMDUR) 30 MG 24 hr tablet; Take 1 tablet by mouth Daily.  Dispense: 90 tablet; Refill: 2  -     Cancel: naproxen (NAPROSYN) 500 MG tablet  -     nitroglycerin (NITROSTAT) 0.4 MG SL tablet; Place 1 tablet under the tongue As Needed for Chest Pain. Take no more than 3 doses in 15 minutes.  Dispense: 30 tablet; Refill: 3  -     nystatin (MYCOSTATIN) 318423 UNIT/GM powder; Apply  topically to the appropriate area as directed 3 (Three) Times a Day.  Dispense: 15 g; Refill: 1  -     ondansetron ODT (Zofran ODT) 4 MG disintegrating tablet; Place 1 tablet on the tongue Every 8 (Eight) Hours As Needed for Nausea or Vomiting.  Dispense: 30 tablet; Refill: 0  -     oxybutynin XL (DITROPAN-XL) 5 MG 24 hr tablet; Take 1 tablet by mouth Daily.  Dispense: 90 tablet; Refill: 1  -     pantoprazole (PROTONIX) 40 MG EC tablet; Take 1 tablet by mouth Every Night.  Dispense: 30 tablet; Refill: 5  -     potassium chloride (MICRO-K) 10 MEQ CR capsule; Take 1 capsule by mouth Daily.  Dispense: 30 capsule; Refill: 5  -     promethazine (PHENERGAN) 25 MG tablet; Take 1 tablet by mouth Every 6 (Six) Hours As Needed for Nausea or Vomiting.  Dispense: 30 tablet; Refill: 0  -     sodium bicarbonate 325 MG tablet;  Take 1 tablet by mouth 2 (Two) Times a Day.  Dispense: 60 tablet; Refill: 5    Patient trying to regain DM control.  Will start to take medications as prescribed, and improve diet.  Will obtain repeat hemoglobin A1c in 2 months and try to obtain CGM.  In addition we will follow-up with vascular surgery for leg pain.  Believe bladder fullness can be from discontinuation of oxybutynin due to running out of prescription, and right flank pain from mechanical fall.  However, patient states feels like UTI.  Will monitor and treat as necessary.    Mamadou reviewed and appropriate, request #[id number].  Obtaining urine gabapentin.    · Rx changes: None  · Patient Education: Continue taking medications as prescribed, and improving diet.  · Compliance at present is estimated to be improving.   · Efforts to improve compliance (if necessary) will be directed at unnecessary at this time.     Follow-up:     Return in about 2 months (around 5/26/2021) for Recheck.    Preventative:  Health Maintenance   Topic Date Due   • COVID-19 Vaccine (1) Never done   • ZOSTER VACCINE (1 of 2) Never done   • ANNUAL WELLNESS VISIT  Never done   • DIABETIC EYE EXAM  Never done   • URINE MICROALBUMIN  10/05/2018   • DIABETIC FOOT EXAM  12/26/2019   • PAP SMEAR  01/04/2020   • LUNG CANCER SCREENING  01/10/2021   • COLONOSCOPY  05/02/2021   • LIPID PANEL  06/24/2021   • HEMOGLOBIN A1C  08/25/2021   • MAMMOGRAM  01/10/2022   • TDAP/TD VACCINES (3 - Td) 11/10/2024   • HEPATITIS C SCREENING  Completed   • Pneumococcal Vaccine 0-64  Completed   • INFLUENZA VACCINE  Completed   • MENINGOCOCCAL VACCINE  Aged Out       Recommended: none  Vaccine Counseling: N/A    Weight  -Class: Obese Class III extreme obesity: > or equal to 40kg/m2  -Patient's Body mass index is 50.46 kg/m². BMI is above normal parameters. Recommendations include: exercise counseling and nutrition counseling.   eat more fruits and vegetables, decrease soda or juice intake, increase water  intake, increase physical activity and reduce screen time    Alcohol use:  reports no history of alcohol use.  Nicotine status  reports that she has been smoking cigarettes. She has a 11.50 pack-year smoking history. She has never used smokeless tobacco.    Goals     • Fasting Blood Glucose       Barriers: compliance with diet      • Quit smoking / using tobacco           RISK SCORE: 3          This document has been electronically signed by Nirmal Calvillo MD on March 26, 2021 18:32 CDT

## 2021-03-30 LAB — GABAPENTIN UR-MCNC: NEGATIVE UG/ML

## 2021-03-30 NOTE — PROGRESS NOTES
I have reviewed the notes, assessments, and/or procedures performed by Nirmal Calvillo MD  , I concur with her/his documentation of Yamileth Slater.

## 2021-03-31 ENCOUNTER — HOSPITAL ENCOUNTER (INPATIENT)
Facility: HOSPITAL | Age: 61
LOS: 12 days | Discharge: HOME HEALTH | End: 2021-04-12
Attending: EMERGENCY MEDICINE | Admitting: FAMILY MEDICINE

## 2021-03-31 ENCOUNTER — APPOINTMENT (OUTPATIENT)
Dept: GENERAL RADIOLOGY | Facility: HOSPITAL | Age: 61
End: 2021-03-31

## 2021-03-31 ENCOUNTER — APPOINTMENT (OUTPATIENT)
Dept: CT IMAGING | Facility: HOSPITAL | Age: 61
End: 2021-03-31

## 2021-03-31 ENCOUNTER — PATIENT OUTREACH (OUTPATIENT)
Dept: FAMILY MEDICINE CLINIC | Facility: CLINIC | Age: 61
End: 2021-03-31

## 2021-03-31 DIAGNOSIS — R73.9 HYPERGLYCEMIA: Primary | ICD-10-CM

## 2021-03-31 DIAGNOSIS — N39.0 URINARY TRACT INFECTION IN FEMALE: ICD-10-CM

## 2021-03-31 DIAGNOSIS — Z74.09 IMPAIRED FUNCTIONAL MOBILITY, BALANCE, GAIT, AND ENDURANCE: ICD-10-CM

## 2021-03-31 DIAGNOSIS — Z74.09 IMPAIRED MOBILITY AND ADLS: ICD-10-CM

## 2021-03-31 DIAGNOSIS — IMO0002 UNCONTROLLED TYPE 2 DIABETES MELLITUS WITH DIABETIC POLYNEUROPATHY, WITH LONG-TERM CURRENT USE OF INSULIN: ICD-10-CM

## 2021-03-31 DIAGNOSIS — Z78.9 IMPAIRED MOBILITY AND ADLS: ICD-10-CM

## 2021-03-31 DIAGNOSIS — R13.19 ESOPHAGEAL DYSPHAGIA: ICD-10-CM

## 2021-03-31 PROBLEM — E83.51 HYPOCALCEMIA: Status: ACTIVE | Noted: 2021-03-31

## 2021-03-31 PROBLEM — N30.00 ACUTE CYSTITIS: Status: ACTIVE | Noted: 2021-03-31

## 2021-03-31 PROBLEM — E87.5 HYPERKALEMIA: Status: ACTIVE | Noted: 2021-03-31

## 2021-03-31 PROBLEM — N18.9 ACUTE RENAL FAILURE SUPERIMPOSED ON CHRONIC KIDNEY DISEASE: Status: ACTIVE | Noted: 2017-04-16

## 2021-03-31 PROBLEM — E87.1 HYPONATREMIA: Status: ACTIVE | Noted: 2021-03-31

## 2021-03-31 PROBLEM — E87.20 METABOLIC ACIDOSIS: Status: ACTIVE | Noted: 2021-03-31

## 2021-03-31 PROBLEM — I73.89 HHS (HYPOTHENAR HAMMER SYNDROME): Status: ACTIVE | Noted: 2021-03-31

## 2021-03-31 PROBLEM — D64.9 ANEMIA: Status: ACTIVE | Noted: 2021-03-31

## 2021-03-31 LAB
ACETONE BLD QL: ABNORMAL
ALBUMIN SERPL-MCNC: 2.7 G/DL (ref 3.5–5.2)
ALBUMIN SERPL-MCNC: 2.9 G/DL (ref 3.5–5.2)
ALBUMIN/GLOB SERPL: 0.7 G/DL
ALBUMIN/GLOB SERPL: 0.8 G/DL
ALP SERPL-CCNC: 165 U/L (ref 39–117)
ALP SERPL-CCNC: 260 U/L (ref 39–117)
ALT SERPL W P-5'-P-CCNC: 6 U/L (ref 1–33)
ALT SERPL W P-5'-P-CCNC: 8 U/L (ref 1–33)
AMPHET+METHAMPHET UR QL: NEGATIVE
AMPHETAMINES UR QL: NEGATIVE
ANION GAP SERPL CALCULATED.3IONS-SCNC: 11 MMOL/L (ref 5–15)
ANION GAP SERPL CALCULATED.3IONS-SCNC: 9 MMOL/L (ref 5–15)
ANION GAP SERPL CALCULATED.3IONS-SCNC: 9 MMOL/L (ref 5–15)
ARTERIAL PATENCY WRIST A: ABNORMAL
AST SERPL-CCNC: 10 U/L (ref 1–32)
AST SERPL-CCNC: 8 U/L (ref 1–32)
ATMOSPHERIC PRESS: 754 MMHG
BACTERIA UR QL AUTO: ABNORMAL /HPF
BARBITURATES UR QL SCN: NEGATIVE
BASE EXCESS BLDA CALC-SCNC: -7.7 MMOL/L (ref 0–2)
BASOPHILS # BLD AUTO: 0.05 10*3/MM3 (ref 0–0.2)
BASOPHILS # BLD AUTO: 0.06 10*3/MM3 (ref 0–0.2)
BASOPHILS NFR BLD AUTO: 0.4 % (ref 0–1.5)
BASOPHILS NFR BLD AUTO: 0.5 % (ref 0–1.5)
BDY SITE: ABNORMAL
BENZODIAZ UR QL SCN: NEGATIVE
BILIRUB SERPL-MCNC: <0.2 MG/DL (ref 0–1.2)
BILIRUB SERPL-MCNC: <0.2 MG/DL (ref 0–1.2)
BILIRUB UR QL STRIP: NEGATIVE
BUN SERPL-MCNC: 41 MG/DL (ref 8–23)
BUN SERPL-MCNC: 45 MG/DL (ref 8–23)
BUN SERPL-MCNC: 51 MG/DL (ref 8–23)
BUN/CREAT SERPL: 18.6 (ref 7–25)
BUN/CREAT SERPL: 19 (ref 7–25)
BUN/CREAT SERPL: 19.2 (ref 7–25)
BUPRENORPHINE SERPL-MCNC: NEGATIVE NG/ML
CALCIUM SPEC-SCNC: 7.7 MG/DL (ref 8.6–10.5)
CALCIUM SPEC-SCNC: 8.4 MG/DL (ref 8.6–10.5)
CALCIUM SPEC-SCNC: 8.4 MG/DL (ref 8.6–10.5)
CANNABINOIDS SERPL QL: NEGATIVE
CHLORIDE SERPL-SCNC: 107 MMOL/L (ref 98–107)
CHLORIDE SERPL-SCNC: 110 MMOL/L (ref 98–107)
CHLORIDE SERPL-SCNC: 99 MMOL/L (ref 98–107)
CK SERPL-CCNC: 41 U/L (ref 20–180)
CLARITY UR: ABNORMAL
CO2 SERPL-SCNC: 21 MMOL/L (ref 22–29)
COCAINE UR QL: NEGATIVE
COLOR UR: YELLOW
CREAT SERPL-MCNC: 2.2 MG/DL (ref 0.57–1)
CREAT SERPL-MCNC: 2.37 MG/DL (ref 0.57–1)
CREAT SERPL-MCNC: 2.65 MG/DL (ref 0.57–1)
DEPRECATED RDW RBC AUTO: 48.4 FL (ref 37–54)
DEPRECATED RDW RBC AUTO: 49 FL (ref 37–54)
EOSINOPHIL # BLD AUTO: 0.19 10*3/MM3 (ref 0–0.4)
EOSINOPHIL # BLD AUTO: 0.2 10*3/MM3 (ref 0–0.4)
EOSINOPHIL NFR BLD AUTO: 1.5 % (ref 0.3–6.2)
EOSINOPHIL NFR BLD AUTO: 1.7 % (ref 0.3–6.2)
ERYTHROCYTE [DISTWIDTH] IN BLOOD BY AUTOMATED COUNT: 15.1 % (ref 12.3–15.4)
ERYTHROCYTE [DISTWIDTH] IN BLOOD BY AUTOMATED COUNT: 15.4 % (ref 12.3–15.4)
ETHANOL BLD-MCNC: <10 MG/DL (ref 0–10)
ETHANOL UR QL: <0.01 %
FERRITIN SERPL-MCNC: 53.52 NG/ML (ref 13–150)
FLUAV RNA RESP QL NAA+PROBE: NOT DETECTED
FLUBV RNA RESP QL NAA+PROBE: NOT DETECTED
GAS FLOW AIRWAY: 2 LPM
GFR SERPL CREATININE-BSD FRML MDRD: 18 ML/MIN/1.73
GFR SERPL CREATININE-BSD FRML MDRD: 21 ML/MIN/1.73
GFR SERPL CREATININE-BSD FRML MDRD: 23 ML/MIN/1.73
GLOBULIN UR ELPH-MCNC: 3.6 GM/DL
GLOBULIN UR ELPH-MCNC: 3.9 GM/DL
GLUCOSE SERPL-MCNC: 130 MG/DL (ref 65–99)
GLUCOSE SERPL-MCNC: 280 MG/DL (ref 65–99)
GLUCOSE SERPL-MCNC: 674 MG/DL (ref 65–99)
GLUCOSE UR STRIP-MCNC: ABNORMAL MG/DL
HBA1C MFR BLD: 14 % (ref 4.8–5.6)
HCO3 BLDA-SCNC: 19.2 MMOL/L (ref 20–26)
HCT VFR BLD AUTO: 30.2 % (ref 34–46.6)
HCT VFR BLD AUTO: 32.2 % (ref 34–46.6)
HGB BLD-MCNC: 10.2 G/DL (ref 12–15.9)
HGB BLD-MCNC: 9.7 G/DL (ref 12–15.9)
HGB UR QL STRIP.AUTO: ABNORMAL
HOLD SPECIMEN: NORMAL
HOLD SPECIMEN: NORMAL
HYALINE CASTS UR QL AUTO: ABNORMAL /LPF
IMM GRANULOCYTES # BLD AUTO: 0.07 10*3/MM3 (ref 0–0.05)
IMM GRANULOCYTES # BLD AUTO: 0.07 10*3/MM3 (ref 0–0.05)
IMM GRANULOCYTES NFR BLD AUTO: 0.5 % (ref 0–0.5)
IMM GRANULOCYTES NFR BLD AUTO: 0.6 % (ref 0–0.5)
INR PPP: 1.01 (ref 0.8–1.2)
IRON 24H UR-MRATE: 14 MCG/DL (ref 37–145)
IRON SATN MFR SERPL: 6 % (ref 20–50)
KETONES UR QL STRIP: NEGATIVE
LEUKOCYTE ESTERASE UR QL STRIP.AUTO: ABNORMAL
LIPASE SERPL-CCNC: 152 U/L (ref 13–60)
LYMPHOCYTES # BLD AUTO: 1.79 10*3/MM3 (ref 0.7–3.1)
LYMPHOCYTES # BLD AUTO: 2.32 10*3/MM3 (ref 0.7–3.1)
LYMPHOCYTES NFR BLD AUTO: 13.8 % (ref 19.6–45.3)
LYMPHOCYTES NFR BLD AUTO: 20.3 % (ref 19.6–45.3)
Lab: ABNORMAL
MAGNESIUM SERPL-MCNC: 1.9 MG/DL (ref 1.6–2.4)
MAGNESIUM SERPL-MCNC: 2 MG/DL (ref 1.6–2.4)
MCH RBC QN AUTO: 27.9 PG (ref 26.6–33)
MCH RBC QN AUTO: 28.1 PG (ref 26.6–33)
MCHC RBC AUTO-ENTMCNC: 31.7 G/DL (ref 31.5–35.7)
MCHC RBC AUTO-ENTMCNC: 32.1 G/DL (ref 31.5–35.7)
MCV RBC AUTO: 87.5 FL (ref 79–97)
MCV RBC AUTO: 88.2 FL (ref 79–97)
METHADONE UR QL SCN: NEGATIVE
MODALITY: ABNORMAL
MONOCYTES # BLD AUTO: 0.44 10*3/MM3 (ref 0.1–0.9)
MONOCYTES # BLD AUTO: 0.62 10*3/MM3 (ref 0.1–0.9)
MONOCYTES NFR BLD AUTO: 3.8 % (ref 5–12)
MONOCYTES NFR BLD AUTO: 4.8 % (ref 5–12)
NEUTROPHILS NFR BLD AUTO: 10.19 10*3/MM3 (ref 1.7–7)
NEUTROPHILS NFR BLD AUTO: 73.2 % (ref 42.7–76)
NEUTROPHILS NFR BLD AUTO: 78.9 % (ref 42.7–76)
NEUTROPHILS NFR BLD AUTO: 8.38 10*3/MM3 (ref 1.7–7)
NITRITE UR QL STRIP: POSITIVE
NRBC BLD AUTO-RTO: 0 /100 WBC (ref 0–0.2)
NRBC BLD AUTO-RTO: 0 /100 WBC (ref 0–0.2)
NT-PROBNP SERPL-MCNC: 795.8 PG/ML (ref 0–900)
OPIATES UR QL: NEGATIVE
OSMOLALITY SERPL: 324 MOSM/KG (ref 280–290)
OXYCODONE UR QL SCN: NEGATIVE
PCO2 BLDA: 43.8 MM HG (ref 35–45)
PCP UR QL SCN: NEGATIVE
PH BLDA: 7.25 PH UNITS (ref 7.35–7.45)
PH UR STRIP.AUTO: 6 [PH] (ref 5–9)
PHOSPHATE SERPL-MCNC: 3.6 MG/DL (ref 2.5–4.5)
PHOSPHATE SERPL-MCNC: 3.9 MG/DL (ref 2.5–4.5)
PLATELET # BLD AUTO: 242 10*3/MM3 (ref 140–450)
PLATELET # BLD AUTO: 264 10*3/MM3 (ref 140–450)
PMV BLD AUTO: 10 FL (ref 6–12)
PMV BLD AUTO: 9.3 FL (ref 6–12)
PO2 BLDA: 111 MM HG (ref 83–108)
POTASSIUM SERPL-SCNC: 4 MMOL/L (ref 3.5–5.2)
POTASSIUM SERPL-SCNC: 4.1 MMOL/L (ref 3.5–5.2)
POTASSIUM SERPL-SCNC: 6 MMOL/L (ref 3.5–5.2)
PROPOXYPH UR QL: NEGATIVE
PROT SERPL-MCNC: 6.5 G/DL (ref 6–8.5)
PROT SERPL-MCNC: 6.6 G/DL (ref 6–8.5)
PROT UR QL STRIP: ABNORMAL
PROTHROMBIN TIME: 13.7 SECONDS (ref 11.1–15.3)
RBC # BLD AUTO: 3.45 10*6/MM3 (ref 3.77–5.28)
RBC # BLD AUTO: 3.65 10*6/MM3 (ref 3.77–5.28)
RBC # UR: ABNORMAL /HPF
REF LAB TEST METHOD: ABNORMAL
SAO2 % BLDCOA: 98.5 % (ref 94–99)
SARS-COV-2 RNA RESP QL NAA+PROBE: NOT DETECTED
SODIUM SERPL-SCNC: 129 MMOL/L (ref 136–145)
SODIUM SERPL-SCNC: 137 MMOL/L (ref 136–145)
SODIUM SERPL-SCNC: 142 MMOL/L (ref 136–145)
SP GR UR STRIP: 1.02 (ref 1–1.03)
SQUAMOUS #/AREA URNS HPF: ABNORMAL /HPF
TIBC SERPL-MCNC: 235 MCG/DL (ref 298–536)
TRANSFERRIN SERPL-MCNC: 158 MG/DL (ref 200–360)
TRICYCLICS UR QL SCN: NEGATIVE
TROPONIN T SERPL-MCNC: 0.01 NG/ML (ref 0–0.03)
TROPONIN T SERPL-MCNC: <0.01 NG/ML (ref 0–0.03)
UROBILINOGEN UR QL STRIP: ABNORMAL
VENTILATOR MODE: ABNORMAL
WBC # BLD AUTO: 11.45 10*3/MM3 (ref 3.4–10.8)
WBC # BLD AUTO: 12.93 10*3/MM3 (ref 3.4–10.8)
WBC UR QL AUTO: ABNORMAL /HPF
WHOLE BLOOD HOLD SPECIMEN: NORMAL
WHOLE BLOOD HOLD SPECIMEN: NORMAL

## 2021-03-31 PROCEDURE — 85025 COMPLETE CBC W/AUTO DIFF WBC: CPT | Performed by: STUDENT IN AN ORGANIZED HEALTH CARE EDUCATION/TRAINING PROGRAM

## 2021-03-31 PROCEDURE — 82550 ASSAY OF CK (CPK): CPT | Performed by: NURSE PRACTITIONER

## 2021-03-31 PROCEDURE — 85025 COMPLETE CBC W/AUTO DIFF WBC: CPT | Performed by: NURSE PRACTITIONER

## 2021-03-31 PROCEDURE — 0100U HC BIOFIRE FILMARRAY RESP PANEL 2: CPT | Performed by: STUDENT IN AN ORGANIZED HEALTH CARE EDUCATION/TRAINING PROGRAM

## 2021-03-31 PROCEDURE — 25010000002 HEPARIN (PORCINE) PER 1000 UNITS: Performed by: STUDENT IN AN ORGANIZED HEALTH CARE EDUCATION/TRAINING PROGRAM

## 2021-03-31 PROCEDURE — 83930 ASSAY OF BLOOD OSMOLALITY: CPT | Performed by: NURSE PRACTITIONER

## 2021-03-31 PROCEDURE — 36600 WITHDRAWAL OF ARTERIAL BLOOD: CPT

## 2021-03-31 PROCEDURE — 25010000002 CEFTRIAXONE PER 250 MG: Performed by: NURSE PRACTITIONER

## 2021-03-31 PROCEDURE — 85610 PROTHROMBIN TIME: CPT | Performed by: NURSE PRACTITIONER

## 2021-03-31 PROCEDURE — 83735 ASSAY OF MAGNESIUM: CPT | Performed by: STUDENT IN AN ORGANIZED HEALTH CARE EDUCATION/TRAINING PROGRAM

## 2021-03-31 PROCEDURE — 83690 ASSAY OF LIPASE: CPT | Performed by: NURSE PRACTITIONER

## 2021-03-31 PROCEDURE — 82728 ASSAY OF FERRITIN: CPT | Performed by: STUDENT IN AN ORGANIZED HEALTH CARE EDUCATION/TRAINING PROGRAM

## 2021-03-31 PROCEDURE — 71045 X-RAY EXAM CHEST 1 VIEW: CPT

## 2021-03-31 PROCEDURE — 84484 ASSAY OF TROPONIN QUANT: CPT | Performed by: NURSE PRACTITIONER

## 2021-03-31 PROCEDURE — 87636 SARSCOV2 & INF A&B AMP PRB: CPT | Performed by: NURSE PRACTITIONER

## 2021-03-31 PROCEDURE — 82746 ASSAY OF FOLIC ACID SERUM: CPT | Performed by: STUDENT IN AN ORGANIZED HEALTH CARE EDUCATION/TRAINING PROGRAM

## 2021-03-31 PROCEDURE — 93010 ELECTROCARDIOGRAM REPORT: CPT | Performed by: INTERNAL MEDICINE

## 2021-03-31 PROCEDURE — 80053 COMPREHEN METABOLIC PANEL: CPT | Performed by: NURSE PRACTITIONER

## 2021-03-31 PROCEDURE — 84466 ASSAY OF TRANSFERRIN: CPT | Performed by: STUDENT IN AN ORGANIZED HEALTH CARE EDUCATION/TRAINING PROGRAM

## 2021-03-31 PROCEDURE — G0378 HOSPITAL OBSERVATION PER HR: HCPCS

## 2021-03-31 PROCEDURE — 99223 1ST HOSP IP/OBS HIGH 75: CPT | Performed by: STUDENT IN AN ORGANIZED HEALTH CARE EDUCATION/TRAINING PROGRAM

## 2021-03-31 PROCEDURE — 84100 ASSAY OF PHOSPHORUS: CPT | Performed by: NURSE PRACTITIONER

## 2021-03-31 PROCEDURE — 80053 COMPREHEN METABOLIC PANEL: CPT | Performed by: STUDENT IN AN ORGANIZED HEALTH CARE EDUCATION/TRAINING PROGRAM

## 2021-03-31 PROCEDURE — 80306 DRUG TEST PRSMV INSTRMNT: CPT | Performed by: NURSE PRACTITIONER

## 2021-03-31 PROCEDURE — 36415 COLL VENOUS BLD VENIPUNCTURE: CPT | Performed by: NURSE PRACTITIONER

## 2021-03-31 PROCEDURE — 25010000003 INSULIN REGULAR HUMAN PER 5 UNITS: Performed by: NURSE PRACTITIONER

## 2021-03-31 PROCEDURE — 93005 ELECTROCARDIOGRAM TRACING: CPT | Performed by: STUDENT IN AN ORGANIZED HEALTH CARE EDUCATION/TRAINING PROGRAM

## 2021-03-31 PROCEDURE — 83540 ASSAY OF IRON: CPT | Performed by: STUDENT IN AN ORGANIZED HEALTH CARE EDUCATION/TRAINING PROGRAM

## 2021-03-31 PROCEDURE — 99285 EMERGENCY DEPT VISIT HI MDM: CPT

## 2021-03-31 PROCEDURE — P9612 CATHETERIZE FOR URINE SPEC: HCPCS

## 2021-03-31 PROCEDURE — 82803 BLOOD GASES ANY COMBINATION: CPT

## 2021-03-31 PROCEDURE — 82607 VITAMIN B-12: CPT | Performed by: STUDENT IN AN ORGANIZED HEALTH CARE EDUCATION/TRAINING PROGRAM

## 2021-03-31 PROCEDURE — 72220 X-RAY EXAM SACRUM TAILBONE: CPT

## 2021-03-31 PROCEDURE — 70450 CT HEAD/BRAIN W/O DYE: CPT

## 2021-03-31 PROCEDURE — 25010000002 ONDANSETRON PER 1 MG: Performed by: NURSE PRACTITIONER

## 2021-03-31 PROCEDURE — 93005 ELECTROCARDIOGRAM TRACING: CPT | Performed by: NURSE PRACTITIONER

## 2021-03-31 PROCEDURE — 84484 ASSAY OF TROPONIN QUANT: CPT | Performed by: STUDENT IN AN ORGANIZED HEALTH CARE EDUCATION/TRAINING PROGRAM

## 2021-03-31 PROCEDURE — 83880 ASSAY OF NATRIURETIC PEPTIDE: CPT | Performed by: NURSE PRACTITIONER

## 2021-03-31 PROCEDURE — 83735 ASSAY OF MAGNESIUM: CPT | Performed by: NURSE PRACTITIONER

## 2021-03-31 PROCEDURE — 81001 URINALYSIS AUTO W/SCOPE: CPT | Performed by: NURSE PRACTITIONER

## 2021-03-31 PROCEDURE — 72100 X-RAY EXAM L-S SPINE 2/3 VWS: CPT

## 2021-03-31 PROCEDURE — 25010000002 MORPHINE PER 10 MG: Performed by: NURSE PRACTITIONER

## 2021-03-31 PROCEDURE — 82009 KETONE BODYS QUAL: CPT | Performed by: NURSE PRACTITIONER

## 2021-03-31 PROCEDURE — 63710000001 INSULIN REGULAR HUMAN PER 5 UNITS: Performed by: NURSE PRACTITIONER

## 2021-03-31 PROCEDURE — 82962 GLUCOSE BLOOD TEST: CPT

## 2021-03-31 PROCEDURE — 83036 HEMOGLOBIN GLYCOSYLATED A1C: CPT | Performed by: NURSE PRACTITIONER

## 2021-03-31 PROCEDURE — 82077 ASSAY SPEC XCP UR&BREATH IA: CPT | Performed by: NURSE PRACTITIONER

## 2021-03-31 RX ORDER — SODIUM CHLORIDE 0.9 % (FLUSH) 0.9 %
10 SYRINGE (ML) INJECTION AS NEEDED
Status: DISCONTINUED | OUTPATIENT
Start: 2021-03-31 | End: 2021-04-12 | Stop reason: HOSPADM

## 2021-03-31 RX ORDER — SODIUM CHLORIDE 0.9 % (FLUSH) 0.9 %
10 SYRINGE (ML) INJECTION AS NEEDED
Status: DISCONTINUED | OUTPATIENT
Start: 2021-03-31 | End: 2021-04-01

## 2021-03-31 RX ORDER — ONDANSETRON 2 MG/ML
4 INJECTION INTRAMUSCULAR; INTRAVENOUS ONCE
Status: COMPLETED | OUTPATIENT
Start: 2021-03-31 | End: 2021-03-31

## 2021-03-31 RX ORDER — SODIUM CHLORIDE 9 MG/ML
10 INJECTION, SOLUTION INTRAVENOUS CONTINUOUS PRN
Status: DISCONTINUED | OUTPATIENT
Start: 2021-03-31 | End: 2021-04-01

## 2021-03-31 RX ORDER — DEXTROSE MONOHYDRATE 25 G/50ML
12.5 INJECTION, SOLUTION INTRAVENOUS AS NEEDED
Status: DISCONTINUED | OUTPATIENT
Start: 2021-03-31 | End: 2021-04-01

## 2021-03-31 RX ORDER — SODIUM BICARBONATE 325 MG/1
325 TABLET ORAL 2 TIMES DAILY
Status: DISCONTINUED | OUTPATIENT
Start: 2021-03-31 | End: 2021-04-04

## 2021-03-31 RX ORDER — FERROUS SULFATE TAB EC 324 MG (65 MG FE EQUIVALENT) 324 (65 FE) MG
324 TABLET DELAYED RESPONSE ORAL
Status: DISCONTINUED | OUTPATIENT
Start: 2021-04-01 | End: 2021-04-03

## 2021-03-31 RX ORDER — SODIUM CHLORIDE 9 MG/ML
125 INJECTION, SOLUTION INTRAVENOUS CONTINUOUS
Status: DISCONTINUED | OUTPATIENT
Start: 2021-03-31 | End: 2021-04-01

## 2021-03-31 RX ORDER — POTASSIUM CHLORIDE 750 MG/1
10 CAPSULE, EXTENDED RELEASE ORAL DAILY
Status: DISCONTINUED | OUTPATIENT
Start: 2021-04-01 | End: 2021-04-12 | Stop reason: HOSPADM

## 2021-03-31 RX ORDER — NITROGLYCERIN 0.4 MG/1
0.4 TABLET SUBLINGUAL AS NEEDED
Status: DISCONTINUED | OUTPATIENT
Start: 2021-03-31 | End: 2021-04-12 | Stop reason: HOSPADM

## 2021-03-31 RX ORDER — ISOSORBIDE MONONITRATE 30 MG/1
30 TABLET, EXTENDED RELEASE ORAL DAILY
Status: DISCONTINUED | OUTPATIENT
Start: 2021-04-01 | End: 2021-04-12 | Stop reason: HOSPADM

## 2021-03-31 RX ORDER — CYCLOBENZAPRINE HCL 10 MG
10 TABLET ORAL 2 TIMES DAILY PRN
Status: DISCONTINUED | OUTPATIENT
Start: 2021-03-31 | End: 2021-04-12 | Stop reason: HOSPADM

## 2021-03-31 RX ORDER — PANTOPRAZOLE SODIUM 40 MG/1
40 TABLET, DELAYED RELEASE ORAL NIGHTLY
Status: DISCONTINUED | OUTPATIENT
Start: 2021-03-31 | End: 2021-04-12 | Stop reason: HOSPADM

## 2021-03-31 RX ORDER — NYSTATIN 100000 [USP'U]/G
POWDER TOPICAL 3 TIMES DAILY
Status: DISCONTINUED | OUTPATIENT
Start: 2021-03-31 | End: 2021-04-12 | Stop reason: HOSPADM

## 2021-03-31 RX ORDER — ALBUTEROL SULFATE 2.5 MG/3ML
2.5 SOLUTION RESPIRATORY (INHALATION) EVERY 4 HOURS PRN
Status: DISCONTINUED | OUTPATIENT
Start: 2021-03-31 | End: 2021-04-12 | Stop reason: HOSPADM

## 2021-03-31 RX ORDER — POTASSIUM CHLORIDE, DEXTROSE MONOHYDRATE AND SODIUM CHLORIDE 300; 5; 900 MG/100ML; G/100ML; MG/100ML
150 INJECTION, SOLUTION INTRAVENOUS CONTINUOUS PRN
Status: DISCONTINUED | OUTPATIENT
Start: 2021-03-31 | End: 2021-04-01

## 2021-03-31 RX ORDER — SODIUM CHLORIDE 9 MG/ML
250 INJECTION, SOLUTION INTRAVENOUS CONTINUOUS PRN
Status: DISCONTINUED | OUTPATIENT
Start: 2021-03-31 | End: 2021-04-01

## 2021-03-31 RX ORDER — DEXTROSE AND SODIUM CHLORIDE 5; .9 G/100ML; G/100ML
150 INJECTION, SOLUTION INTRAVENOUS CONTINUOUS PRN
Status: DISCONTINUED | OUTPATIENT
Start: 2021-03-31 | End: 2021-04-01

## 2021-03-31 RX ORDER — SODIUM CHLORIDE AND POTASSIUM CHLORIDE 150; 900 MG/100ML; MG/100ML
250 INJECTION, SOLUTION INTRAVENOUS CONTINUOUS PRN
Status: DISCONTINUED | OUTPATIENT
Start: 2021-03-31 | End: 2021-04-01

## 2021-03-31 RX ORDER — SODIUM CHLORIDE 0.9 % (FLUSH) 0.9 %
10 SYRINGE (ML) INJECTION EVERY 12 HOURS SCHEDULED
Status: DISCONTINUED | OUTPATIENT
Start: 2021-03-31 | End: 2021-04-12 | Stop reason: HOSPADM

## 2021-03-31 RX ORDER — SODIUM CHLORIDE AND POTASSIUM CHLORIDE 150; 450 MG/100ML; MG/100ML
250 INJECTION, SOLUTION INTRAVENOUS CONTINUOUS PRN
Status: DISCONTINUED | OUTPATIENT
Start: 2021-03-31 | End: 2021-04-01

## 2021-03-31 RX ORDER — DEXTROSE AND SODIUM CHLORIDE 5; .45 G/100ML; G/100ML
150 INJECTION, SOLUTION INTRAVENOUS CONTINUOUS PRN
Status: DISCONTINUED | OUTPATIENT
Start: 2021-03-31 | End: 2021-04-01

## 2021-03-31 RX ORDER — ACETAMINOPHEN 325 MG/1
650 TABLET ORAL EVERY 6 HOURS PRN
Status: DISCONTINUED | OUTPATIENT
Start: 2021-03-31 | End: 2021-04-12 | Stop reason: HOSPADM

## 2021-03-31 RX ORDER — SODIUM CHLORIDE 0.9 % (FLUSH) 0.9 %
10 SYRINGE (ML) INJECTION ONCE AS NEEDED
Status: DISCONTINUED | OUTPATIENT
Start: 2021-03-31 | End: 2021-04-01

## 2021-03-31 RX ORDER — SODIUM CHLORIDE AND POTASSIUM CHLORIDE 300; 900 MG/100ML; MG/100ML
250 INJECTION, SOLUTION INTRAVENOUS CONTINUOUS PRN
Status: DISCONTINUED | OUTPATIENT
Start: 2021-03-31 | End: 2021-04-01

## 2021-03-31 RX ORDER — PROMETHAZINE HYDROCHLORIDE 25 MG/1
25 TABLET ORAL EVERY 6 HOURS PRN
Status: DISCONTINUED | OUTPATIENT
Start: 2021-03-31 | End: 2021-04-12 | Stop reason: HOSPADM

## 2021-03-31 RX ORDER — DEXTROSE, SODIUM CHLORIDE, AND POTASSIUM CHLORIDE 5; .9; .15 G/100ML; G/100ML; G/100ML
150 INJECTION INTRAVENOUS CONTINUOUS PRN
Status: DISCONTINUED | OUTPATIENT
Start: 2021-03-31 | End: 2021-04-01

## 2021-03-31 RX ORDER — LISINOPRIL 5 MG/1
10 TABLET ORAL DAILY
Status: CANCELLED | OUTPATIENT
Start: 2021-03-31

## 2021-03-31 RX ORDER — HEPARIN SODIUM 5000 [USP'U]/ML
5000 INJECTION, SOLUTION INTRAVENOUS; SUBCUTANEOUS EVERY 12 HOURS SCHEDULED
Status: DISCONTINUED | OUTPATIENT
Start: 2021-03-31 | End: 2021-04-01

## 2021-03-31 RX ORDER — ATORVASTATIN CALCIUM 40 MG/1
40 TABLET, FILM COATED ORAL DAILY
Status: DISCONTINUED | OUTPATIENT
Start: 2021-04-01 | End: 2021-04-12 | Stop reason: HOSPADM

## 2021-03-31 RX ORDER — CLOPIDOGREL BISULFATE 75 MG/1
75 TABLET ORAL DAILY
Status: DISCONTINUED | OUTPATIENT
Start: 2021-04-01 | End: 2021-04-12 | Stop reason: HOSPADM

## 2021-03-31 RX ORDER — DEXTROSE, SODIUM CHLORIDE, AND POTASSIUM CHLORIDE 5; .45; .3 G/100ML; G/100ML; G/100ML
150 INJECTION INTRAVENOUS CONTINUOUS PRN
Status: DISCONTINUED | OUTPATIENT
Start: 2021-03-31 | End: 2021-04-01

## 2021-03-31 RX ORDER — METOPROLOL TARTRATE 50 MG/1
100 TABLET, FILM COATED ORAL EVERY 12 HOURS SCHEDULED
Status: DISCONTINUED | OUTPATIENT
Start: 2021-03-31 | End: 2021-04-12 | Stop reason: HOSPADM

## 2021-03-31 RX ORDER — DEXTROSE, SODIUM CHLORIDE, AND POTASSIUM CHLORIDE 5; .45; .15 G/100ML; G/100ML; G/100ML
150 INJECTION INTRAVENOUS CONTINUOUS PRN
Status: DISCONTINUED | OUTPATIENT
Start: 2021-03-31 | End: 2021-04-01

## 2021-03-31 RX ORDER — IPRATROPIUM BROMIDE AND ALBUTEROL SULFATE 2.5; .5 MG/3ML; MG/3ML
3 SOLUTION RESPIRATORY (INHALATION)
Status: DISCONTINUED | OUTPATIENT
Start: 2021-03-31 | End: 2021-04-12 | Stop reason: HOSPADM

## 2021-03-31 RX ORDER — SODIUM CHLORIDE 450 MG/100ML
250 INJECTION, SOLUTION INTRAVENOUS CONTINUOUS PRN
Status: DISCONTINUED | OUTPATIENT
Start: 2021-03-31 | End: 2021-04-01

## 2021-03-31 RX ORDER — GABAPENTIN 100 MG/1
100 CAPSULE ORAL EVERY 8 HOURS SCHEDULED
Status: DISCONTINUED | OUTPATIENT
Start: 2021-03-31 | End: 2021-04-06

## 2021-03-31 RX ORDER — SODIUM CHLORIDE 0.9 % (FLUSH) 0.9 %
3 SYRINGE (ML) INJECTION EVERY 12 HOURS SCHEDULED
Status: DISCONTINUED | OUTPATIENT
Start: 2021-03-31 | End: 2021-04-01

## 2021-03-31 RX ORDER — OXYBUTYNIN CHLORIDE 5 MG/1
5 TABLET, EXTENDED RELEASE ORAL DAILY
Status: DISCONTINUED | OUTPATIENT
Start: 2021-04-01 | End: 2021-04-12 | Stop reason: HOSPADM

## 2021-03-31 RX ORDER — MONTELUKAST SODIUM 10 MG/1
10 TABLET ORAL NIGHTLY
Status: DISCONTINUED | OUTPATIENT
Start: 2021-03-31 | End: 2021-04-12 | Stop reason: HOSPADM

## 2021-03-31 RX ORDER — ONDANSETRON 4 MG/1
4 TABLET, ORALLY DISINTEGRATING ORAL EVERY 8 HOURS PRN
Status: DISCONTINUED | OUTPATIENT
Start: 2021-03-31 | End: 2021-04-12 | Stop reason: HOSPADM

## 2021-03-31 RX ADMIN — SODIUM CHLORIDE, PRESERVATIVE FREE 10 ML: 5 INJECTION INTRAVENOUS at 22:00

## 2021-03-31 RX ADMIN — MORPHINE SULFATE 4 MG: 4 INJECTION, SOLUTION INTRAMUSCULAR; INTRAVENOUS at 15:27

## 2021-03-31 RX ADMIN — SODIUM BICARBONATE TAB 325 MG 325 MG: 325 TAB at 22:02

## 2021-03-31 RX ADMIN — SODIUM CHLORIDE 12 UNITS/HR: 9 INJECTION, SOLUTION INTRAVENOUS at 18:28

## 2021-03-31 RX ADMIN — SODIUM CHLORIDE, PRESERVATIVE FREE 3 ML: 5 INJECTION INTRAVENOUS at 22:00

## 2021-03-31 RX ADMIN — SODIUM CHLORIDE 125 ML/HR: 900 INJECTION, SOLUTION INTRAVENOUS at 13:47

## 2021-03-31 RX ADMIN — POTASSIUM CHLORIDE, DEXTROSE MONOHYDRATE AND SODIUM CHLORIDE 150 ML/HR: 150; 5; 450 INJECTION, SOLUTION INTRAVENOUS at 23:13

## 2021-03-31 RX ADMIN — CEFTRIAXONE SODIUM 1 G: 1 INJECTION, POWDER, FOR SOLUTION INTRAMUSCULAR; INTRAVENOUS at 14:43

## 2021-03-31 RX ADMIN — MONTELUKAST SODIUM 10 MG: 10 TABLET, COATED ORAL at 22:02

## 2021-03-31 RX ADMIN — SODIUM CHLORIDE 2000 ML: 9 INJECTION, SOLUTION INTRAVENOUS at 19:35

## 2021-03-31 RX ADMIN — SODIUM CHLORIDE 1000 ML: 900 INJECTION, SOLUTION INTRAVENOUS at 17:15

## 2021-03-31 RX ADMIN — HEPARIN SODIUM 5000 UNITS: 5000 INJECTION INTRAVENOUS; SUBCUTANEOUS at 22:02

## 2021-03-31 RX ADMIN — HUMAN INSULIN 10 UNITS: 100 INJECTION, SOLUTION SUBCUTANEOUS at 15:21

## 2021-03-31 RX ADMIN — ONDANSETRON 4 MG: 2 INJECTION INTRAMUSCULAR; INTRAVENOUS at 15:27

## 2021-03-31 RX ADMIN — NYSTATIN: 100000 POWDER TOPICAL at 22:00

## 2021-03-31 RX ADMIN — METOPROLOL TARTRATE 100 MG: 50 TABLET, FILM COATED ORAL at 22:03

## 2021-03-31 RX ADMIN — GABAPENTIN 100 MG: 100 CAPSULE ORAL at 22:03

## 2021-03-31 RX ADMIN — PANTOPRAZOLE SODIUM 40 MG: 40 TABLET, DELAYED RELEASE ORAL at 22:03

## 2021-04-01 PROBLEM — E11.00 TYPE 2 DIABETES MELLITUS WITH HYPEROSMOLAR HYPERGLYCEMIC STATE (HHS): Status: ACTIVE | Noted: 2021-03-31

## 2021-04-01 PROBLEM — J18.9 COMMUNITY ACQUIRED PNEUMONIA OF RIGHT MIDDLE LOBE OF LUNG: Status: ACTIVE | Noted: 2021-04-01

## 2021-04-01 PROBLEM — B37.2 CUTANEOUS CANDIDIASIS: Status: ACTIVE | Noted: 2021-04-01

## 2021-04-01 LAB
ALBUMIN SERPL-MCNC: 2.7 G/DL (ref 3.5–5.2)
ALBUMIN/GLOB SERPL: 0.7 G/DL
ALP SERPL-CCNC: 142 U/L (ref 39–117)
ALT SERPL W P-5'-P-CCNC: 8 U/L (ref 1–33)
AMMONIA BLD-SCNC: 15 UMOL/L (ref 11–51)
ANION GAP SERPL CALCULATED.3IONS-SCNC: 10 MMOL/L (ref 5–15)
ANION GAP SERPL CALCULATED.3IONS-SCNC: 11 MMOL/L (ref 5–15)
ANION GAP SERPL CALCULATED.3IONS-SCNC: 11 MMOL/L (ref 5–15)
ANION GAP SERPL CALCULATED.3IONS-SCNC: 9 MMOL/L (ref 5–15)
ARTERIAL PATENCY WRIST A: ABNORMAL
AST SERPL-CCNC: 9 U/L (ref 1–32)
ATMOSPHERIC PRESS: 757 MMHG
ATMOSPHERIC PRESS: 759 MMHG
ATMOSPHERIC PRESS: 759 MMHG
B PARAPERT DNA SPEC QL NAA+PROBE: NOT DETECTED
B PERT DNA SPEC QL NAA+PROBE: NOT DETECTED
BACTERIA UR QL AUTO: ABNORMAL /HPF
BASE EXCESS BLDA CALC-SCNC: -8.6 MMOL/L (ref 0–2)
BASE EXCESS BLDA CALC-SCNC: -9.2 MMOL/L (ref 0–2)
BASE EXCESS BLDA CALC-SCNC: -9.6 MMOL/L (ref 0–2)
BASOPHILS # BLD AUTO: 0.03 10*3/MM3 (ref 0–0.2)
BASOPHILS NFR BLD AUTO: 0.2 % (ref 0–1.5)
BDY SITE: ABNORMAL
BILIRUB SERPL-MCNC: <0.2 MG/DL (ref 0–1.2)
BILIRUB UR QL STRIP: NEGATIVE
BUN SERPL-MCNC: 34 MG/DL (ref 8–23)
BUN SERPL-MCNC: 35 MG/DL (ref 8–23)
BUN SERPL-MCNC: 37 MG/DL (ref 8–23)
BUN SERPL-MCNC: 39 MG/DL (ref 8–23)
BUN SERPL-MCNC: 41 MG/DL (ref 8–23)
BUN SERPL-MCNC: 41 MG/DL (ref 8–23)
BUN SERPL-MCNC: 43 MG/DL (ref 8–23)
BUN/CREAT SERPL: 15.4 (ref 7–25)
BUN/CREAT SERPL: 17.1 (ref 7–25)
BUN/CREAT SERPL: 18.1 (ref 7–25)
BUN/CREAT SERPL: 18.4 (ref 7–25)
BUN/CREAT SERPL: 18.7 (ref 7–25)
C PNEUM DNA NPH QL NAA+NON-PROBE: NOT DETECTED
CALCIUM SPEC-SCNC: 8 MG/DL (ref 8.6–10.5)
CALCIUM SPEC-SCNC: 8 MG/DL (ref 8.6–10.5)
CALCIUM SPEC-SCNC: 8.1 MG/DL (ref 8.6–10.5)
CALCIUM SPEC-SCNC: 8.2 MG/DL (ref 8.6–10.5)
CALCIUM SPEC-SCNC: 8.3 MG/DL (ref 8.6–10.5)
CALCIUM SPEC-SCNC: 8.3 MG/DL (ref 8.6–10.5)
CALCIUM SPEC-SCNC: 8.4 MG/DL (ref 8.6–10.5)
CHLORIDE SERPL-SCNC: 108 MMOL/L (ref 98–107)
CHLORIDE SERPL-SCNC: 109 MMOL/L (ref 98–107)
CHLORIDE SERPL-SCNC: 110 MMOL/L (ref 98–107)
CHLORIDE SERPL-SCNC: 111 MMOL/L (ref 98–107)
CHLORIDE SERPL-SCNC: 111 MMOL/L (ref 98–107)
CLARITY UR: ABNORMAL
CO2 SERPL-SCNC: 16 MMOL/L (ref 22–29)
CO2 SERPL-SCNC: 17 MMOL/L (ref 22–29)
CO2 SERPL-SCNC: 18 MMOL/L (ref 22–29)
CO2 SERPL-SCNC: 18 MMOL/L (ref 22–29)
CO2 SERPL-SCNC: 20 MMOL/L (ref 22–29)
CO2 SERPL-SCNC: 21 MMOL/L (ref 22–29)
CO2 SERPL-SCNC: 21 MMOL/L (ref 22–29)
COLOR UR: YELLOW
CREAT SERPL-MCNC: 2.04 MG/DL (ref 0.57–1)
CREAT SERPL-MCNC: 2.05 MG/DL (ref 0.57–1)
CREAT SERPL-MCNC: 2.15 MG/DL (ref 0.57–1)
CREAT SERPL-MCNC: 2.21 MG/DL (ref 0.57–1)
CREAT SERPL-MCNC: 2.23 MG/DL (ref 0.57–1)
CREAT SERPL-MCNC: 2.26 MG/DL (ref 0.57–1)
CREAT SERPL-MCNC: 2.3 MG/DL (ref 0.57–1)
DEPRECATED RDW RBC AUTO: 49.2 FL (ref 37–54)
EOSINOPHIL # BLD AUTO: 0.16 10*3/MM3 (ref 0–0.4)
EOSINOPHIL NFR BLD AUTO: 1.3 % (ref 0.3–6.2)
EPAP: 8
ERYTHROCYTE [DISTWIDTH] IN BLOOD BY AUTOMATED COUNT: 15.5 % (ref 12.3–15.4)
FLUAV SUBTYP SPEC NAA+PROBE: NOT DETECTED
FLUBV RNA ISLT QL NAA+PROBE: NOT DETECTED
FOLATE SERPL-MCNC: 5.63 NG/ML (ref 4.78–24.2)
GAS FLOW AIRWAY: 1 LPM
GAS FLOW AIRWAY: 3 LPM
GFR SERPL CREATININE-BSD FRML MDRD: 21 ML/MIN/1.73
GFR SERPL CREATININE-BSD FRML MDRD: 22 ML/MIN/1.73
GFR SERPL CREATININE-BSD FRML MDRD: 23 ML/MIN/1.73
GFR SERPL CREATININE-BSD FRML MDRD: 25 ML/MIN/1.73
GFR SERPL CREATININE-BSD FRML MDRD: 25 ML/MIN/1.73
GLOBULIN UR ELPH-MCNC: 4 GM/DL
GLUCOSE BLDC GLUCOMTR-MCNC: 110 MG/DL (ref 70–130)
GLUCOSE BLDC GLUCOMTR-MCNC: 117 MG/DL (ref 70–130)
GLUCOSE BLDC GLUCOMTR-MCNC: 128 MG/DL (ref 70–130)
GLUCOSE BLDC GLUCOMTR-MCNC: 134 MG/DL (ref 70–130)
GLUCOSE BLDC GLUCOMTR-MCNC: 134 MG/DL (ref 70–130)
GLUCOSE BLDC GLUCOMTR-MCNC: 160 MG/DL (ref 70–130)
GLUCOSE BLDC GLUCOMTR-MCNC: 162 MG/DL (ref 70–130)
GLUCOSE BLDC GLUCOMTR-MCNC: 191 MG/DL (ref 70–130)
GLUCOSE BLDC GLUCOMTR-MCNC: 280 MG/DL (ref 70–130)
GLUCOSE BLDC GLUCOMTR-MCNC: 297 MG/DL (ref 70–130)
GLUCOSE BLDC GLUCOMTR-MCNC: 304 MG/DL (ref 70–130)
GLUCOSE BLDC GLUCOMTR-MCNC: 313 MG/DL (ref 70–130)
GLUCOSE BLDC GLUCOMTR-MCNC: 325 MG/DL (ref 70–130)
GLUCOSE BLDC GLUCOMTR-MCNC: 86 MG/DL (ref 70–130)
GLUCOSE BLDC GLUCOMTR-MCNC: 89 MG/DL (ref 70–130)
GLUCOSE SERPL-MCNC: 103 MG/DL (ref 65–99)
GLUCOSE SERPL-MCNC: 176 MG/DL (ref 65–99)
GLUCOSE SERPL-MCNC: 198 MG/DL (ref 65–99)
GLUCOSE SERPL-MCNC: 249 MG/DL (ref 65–99)
GLUCOSE SERPL-MCNC: 304 MG/DL (ref 65–99)
GLUCOSE SERPL-MCNC: 53 MG/DL (ref 65–99)
GLUCOSE SERPL-MCNC: 58 MG/DL (ref 65–99)
GLUCOSE UR STRIP-MCNC: ABNORMAL MG/DL
HADV DNA SPEC NAA+PROBE: NOT DETECTED
HCO3 BLDA-SCNC: 17.5 MMOL/L (ref 20–26)
HCO3 BLDA-SCNC: 17.6 MMOL/L (ref 20–26)
HCO3 BLDA-SCNC: 19 MMOL/L (ref 20–26)
HCOV 229E RNA SPEC QL NAA+PROBE: NOT DETECTED
HCOV HKU1 RNA SPEC QL NAA+PROBE: NOT DETECTED
HCOV NL63 RNA SPEC QL NAA+PROBE: NOT DETECTED
HCOV OC43 RNA SPEC QL NAA+PROBE: NOT DETECTED
HCT VFR BLD AUTO: 30 % (ref 34–46.6)
HGB BLD-MCNC: 9.6 G/DL (ref 12–15.9)
HGB UR QL STRIP.AUTO: ABNORMAL
HMPV RNA NPH QL NAA+NON-PROBE: NOT DETECTED
HOLD SPECIMEN: NORMAL
HPIV1 RNA SPEC QL NAA+PROBE: NOT DETECTED
HPIV2 RNA SPEC QL NAA+PROBE: NOT DETECTED
HPIV3 RNA NPH QL NAA+PROBE: NOT DETECTED
HPIV4 P GENE NPH QL NAA+PROBE: NOT DETECTED
HYALINE CASTS UR QL AUTO: ABNORMAL /LPF
IMM GRANULOCYTES # BLD AUTO: 0.08 10*3/MM3 (ref 0–0.05)
IMM GRANULOCYTES NFR BLD AUTO: 0.7 % (ref 0–0.5)
INHALED O2 CONCENTRATION: 35 %
IPAP: 14
KETONES UR QL STRIP: NEGATIVE
L PNEUMO1 AG UR QL IA: NEGATIVE
LEUKOCYTE ESTERASE UR QL STRIP.AUTO: ABNORMAL
LYMPHOCYTES # BLD AUTO: 1.53 10*3/MM3 (ref 0.7–3.1)
LYMPHOCYTES NFR BLD AUTO: 12.4 % (ref 19.6–45.3)
Lab: ABNORMAL
M PNEUMO IGG SER IA-ACNC: NOT DETECTED
MAGNESIUM SERPL-MCNC: 1.8 MG/DL (ref 1.6–2.4)
MAGNESIUM SERPL-MCNC: 2 MG/DL (ref 1.6–2.4)
MCH RBC QN AUTO: 28.2 PG (ref 26.6–33)
MCHC RBC AUTO-ENTMCNC: 32 G/DL (ref 31.5–35.7)
MCV RBC AUTO: 88 FL (ref 79–97)
MODALITY: ABNORMAL
MONOCYTES # BLD AUTO: 0.58 10*3/MM3 (ref 0.1–0.9)
MONOCYTES NFR BLD AUTO: 4.7 % (ref 5–12)
MRSA DNA SPEC QL NAA+PROBE: POSITIVE
NEUTROPHILS NFR BLD AUTO: 80.7 % (ref 42.7–76)
NEUTROPHILS NFR BLD AUTO: 9.91 10*3/MM3 (ref 1.7–7)
NITRITE UR QL STRIP: POSITIVE
NRBC BLD AUTO-RTO: 0 /100 WBC (ref 0–0.2)
OSMOLALITY SERPL: 304 MOSM/KG (ref 280–290)
OSMOLALITY SERPL: 305 MOSM/KG (ref 280–290)
PCO2 BLDA: 40.1 MM HG (ref 35–45)
PCO2 BLDA: 42.3 MM HG (ref 35–45)
PCO2 BLDA: 47.6 MM HG (ref 35–45)
PH BLDA: 7.21 PH UNITS (ref 7.35–7.45)
PH BLDA: 7.23 PH UNITS (ref 7.35–7.45)
PH BLDA: 7.25 PH UNITS (ref 7.35–7.45)
PH UR STRIP.AUTO: 5.5 [PH] (ref 5–9)
PHOSPHATE SERPL-MCNC: 3.4 MG/DL (ref 2.5–4.5)
PHOSPHATE SERPL-MCNC: 3.8 MG/DL (ref 2.5–4.5)
PLATELET # BLD AUTO: 248 10*3/MM3 (ref 140–450)
PMV BLD AUTO: 9.3 FL (ref 6–12)
PO2 BLDA: 113 MM HG (ref 83–108)
PO2 BLDA: 155 MM HG (ref 83–108)
PO2 BLDA: 84.8 MM HG (ref 83–108)
POTASSIUM SERPL-SCNC: 3.8 MMOL/L (ref 3.5–5.2)
POTASSIUM SERPL-SCNC: 4.2 MMOL/L (ref 3.5–5.2)
POTASSIUM SERPL-SCNC: 4.4 MMOL/L (ref 3.5–5.2)
POTASSIUM SERPL-SCNC: 4.8 MMOL/L (ref 3.5–5.2)
POTASSIUM SERPL-SCNC: 4.9 MMOL/L (ref 3.5–5.2)
POTASSIUM SERPL-SCNC: 5 MMOL/L (ref 3.5–5.2)
POTASSIUM SERPL-SCNC: 5.3 MMOL/L (ref 3.5–5.2)
PROCALCITONIN SERPL-MCNC: 0.55 NG/ML (ref 0–0.25)
PROT SERPL-MCNC: 6.7 G/DL (ref 6–8.5)
PROT UR QL STRIP: ABNORMAL
QT INTERVAL: 384 MS
QT INTERVAL: 406 MS
QTC INTERVAL: 451 MS
QTC INTERVAL: 465 MS
RBC # BLD AUTO: 3.41 10*6/MM3 (ref 3.77–5.28)
RBC # UR: ABNORMAL /HPF
REF LAB TEST METHOD: ABNORMAL
RHINOVIRUS RNA SPEC NAA+PROBE: NOT DETECTED
RSV RNA NPH QL NAA+NON-PROBE: NOT DETECTED
S PNEUM AG SPEC QL LA: NEGATIVE
SAO2 % BLDCOA: 97.1 % (ref 94–99)
SAO2 % BLDCOA: 98.5 % (ref 94–99)
SAO2 % BLDCOA: 99.4 % (ref 94–99)
SET MECH RESP RATE: 18
SODIUM SERPL-SCNC: 137 MMOL/L (ref 136–145)
SODIUM SERPL-SCNC: 138 MMOL/L (ref 136–145)
SODIUM SERPL-SCNC: 139 MMOL/L (ref 136–145)
SODIUM SERPL-SCNC: 140 MMOL/L (ref 136–145)
SODIUM SERPL-SCNC: 141 MMOL/L (ref 136–145)
SP GR UR STRIP: 1.02 (ref 1–1.03)
SQUAMOUS #/AREA URNS HPF: ABNORMAL /HPF
UROBILINOGEN UR QL STRIP: ABNORMAL
VENTILATOR MODE: ABNORMAL
VIT B12 BLD-MCNC: 326 PG/ML (ref 211–946)
WBC # BLD AUTO: 12.29 10*3/MM3 (ref 3.4–10.8)
WBC UR QL AUTO: ABNORMAL /HPF

## 2021-04-01 PROCEDURE — 94760 N-INVAS EAR/PLS OXIMETRY 1: CPT

## 2021-04-01 PROCEDURE — 82962 GLUCOSE BLOOD TEST: CPT

## 2021-04-01 PROCEDURE — 94660 CPAP INITIATION&MGMT: CPT

## 2021-04-01 PROCEDURE — 63710000001 INSULIN ASPART PER 5 UNITS: Performed by: STUDENT IN AN ORGANIZED HEALTH CARE EDUCATION/TRAINING PROGRAM

## 2021-04-01 PROCEDURE — 25010000002 PIPERACILLIN SOD-TAZOBACTAM PER 1 G: Performed by: STUDENT IN AN ORGANIZED HEALTH CARE EDUCATION/TRAINING PROGRAM

## 2021-04-01 PROCEDURE — 25010000002 AZITHROMYCIN PER 500 MG: Performed by: STUDENT IN AN ORGANIZED HEALTH CARE EDUCATION/TRAINING PROGRAM

## 2021-04-01 PROCEDURE — 97161 PT EVAL LOW COMPLEX 20 MIN: CPT

## 2021-04-01 PROCEDURE — 25010000002 HEPARIN (PORCINE) PER 1000 UNITS: Performed by: STUDENT IN AN ORGANIZED HEALTH CARE EDUCATION/TRAINING PROGRAM

## 2021-04-01 PROCEDURE — 25010000002 VANCOMYCIN 5 G RECONSTITUTED SOLUTION: Performed by: STUDENT IN AN ORGANIZED HEALTH CARE EDUCATION/TRAINING PROGRAM

## 2021-04-01 PROCEDURE — 82803 BLOOD GASES ANY COMBINATION: CPT

## 2021-04-01 PROCEDURE — 87899 AGENT NOS ASSAY W/OPTIC: CPT | Performed by: STUDENT IN AN ORGANIZED HEALTH CARE EDUCATION/TRAINING PROGRAM

## 2021-04-01 PROCEDURE — 36600 WITHDRAWAL OF ARTERIAL BLOOD: CPT

## 2021-04-01 PROCEDURE — 81001 URINALYSIS AUTO W/SCOPE: CPT | Performed by: STUDENT IN AN ORGANIZED HEALTH CARE EDUCATION/TRAINING PROGRAM

## 2021-04-01 PROCEDURE — 85025 COMPLETE CBC W/AUTO DIFF WBC: CPT | Performed by: STUDENT IN AN ORGANIZED HEALTH CARE EDUCATION/TRAINING PROGRAM

## 2021-04-01 PROCEDURE — 25010000003 INSULIN REGULAR HUMAN PER 5 UNITS: Performed by: NURSE PRACTITIONER

## 2021-04-01 PROCEDURE — 84100 ASSAY OF PHOSPHORUS: CPT | Performed by: NURSE PRACTITIONER

## 2021-04-01 PROCEDURE — 83735 ASSAY OF MAGNESIUM: CPT | Performed by: STUDENT IN AN ORGANIZED HEALTH CARE EDUCATION/TRAINING PROGRAM

## 2021-04-01 PROCEDURE — 94664 DEMO&/EVAL PT USE INHALER: CPT

## 2021-04-01 PROCEDURE — 87641 MR-STAPH DNA AMP PROBE: CPT | Performed by: STUDENT IN AN ORGANIZED HEALTH CARE EDUCATION/TRAINING PROGRAM

## 2021-04-01 PROCEDURE — 80053 COMPREHEN METABOLIC PANEL: CPT | Performed by: STUDENT IN AN ORGANIZED HEALTH CARE EDUCATION/TRAINING PROGRAM

## 2021-04-01 PROCEDURE — 99233 SBSQ HOSP IP/OBS HIGH 50: CPT | Performed by: STUDENT IN AN ORGANIZED HEALTH CARE EDUCATION/TRAINING PROGRAM

## 2021-04-01 PROCEDURE — 83930 ASSAY OF BLOOD OSMOLALITY: CPT | Performed by: STUDENT IN AN ORGANIZED HEALTH CARE EDUCATION/TRAINING PROGRAM

## 2021-04-01 PROCEDURE — 84145 PROCALCITONIN (PCT): CPT | Performed by: STUDENT IN AN ORGANIZED HEALTH CARE EDUCATION/TRAINING PROGRAM

## 2021-04-01 PROCEDURE — 94799 UNLISTED PULMONARY SVC/PX: CPT

## 2021-04-01 PROCEDURE — 87086 URINE CULTURE/COLONY COUNT: CPT | Performed by: STUDENT IN AN ORGANIZED HEALTH CARE EDUCATION/TRAINING PROGRAM

## 2021-04-01 PROCEDURE — 82140 ASSAY OF AMMONIA: CPT | Performed by: STUDENT IN AN ORGANIZED HEALTH CARE EDUCATION/TRAINING PROGRAM

## 2021-04-01 RX ORDER — HEPARIN SODIUM 5000 [USP'U]/ML
5000 INJECTION, SOLUTION INTRAVENOUS; SUBCUTANEOUS EVERY 8 HOURS SCHEDULED
Status: DISCONTINUED | OUTPATIENT
Start: 2021-04-01 | End: 2021-04-12 | Stop reason: HOSPADM

## 2021-04-01 RX ORDER — DEXTROSE MONOHYDRATE 25 G/50ML
25 INJECTION, SOLUTION INTRAVENOUS
Status: DISCONTINUED | OUTPATIENT
Start: 2021-04-01 | End: 2021-04-12 | Stop reason: HOSPADM

## 2021-04-01 RX ORDER — NICOTINE POLACRILEX 4 MG
15 LOZENGE BUCCAL
Status: DISCONTINUED | OUTPATIENT
Start: 2021-04-01 | End: 2021-04-12 | Stop reason: HOSPADM

## 2021-04-01 RX ORDER — DEXTROSE, SODIUM CHLORIDE, AND POTASSIUM CHLORIDE 5; .45; .075 G/100ML; G/100ML; G/100ML
125 INJECTION INTRAVENOUS CONTINUOUS
Status: DISCONTINUED | OUTPATIENT
Start: 2021-04-01 | End: 2021-04-01

## 2021-04-01 RX ORDER — DEXTROSE AND SODIUM CHLORIDE 5; .9 G/100ML; G/100ML
125 INJECTION, SOLUTION INTRAVENOUS CONTINUOUS
Status: DISCONTINUED | OUTPATIENT
Start: 2021-04-01 | End: 2021-04-02

## 2021-04-01 RX ADMIN — PANTOPRAZOLE SODIUM 40 MG: 40 TABLET, DELAYED RELEASE ORAL at 21:03

## 2021-04-01 RX ADMIN — GABAPENTIN 100 MG: 100 CAPSULE ORAL at 05:09

## 2021-04-01 RX ADMIN — HEPARIN SODIUM 5000 UNITS: 5000 INJECTION INTRAVENOUS; SUBCUTANEOUS at 17:37

## 2021-04-01 RX ADMIN — SODIUM BICARBONATE TAB 325 MG 325 MG: 325 TAB at 21:09

## 2021-04-01 RX ADMIN — NYSTATIN: 100000 POWDER TOPICAL at 21:05

## 2021-04-01 RX ADMIN — POTASSIUM CHLORIDE, DEXTROSE MONOHYDRATE AND SODIUM CHLORIDE 125 ML/HR: 75; 5; 450 INJECTION, SOLUTION INTRAVENOUS at 07:52

## 2021-04-01 RX ADMIN — ACETAMINOPHEN 650 MG: 325 TABLET, FILM COATED ORAL at 21:09

## 2021-04-01 RX ADMIN — SODIUM CHLORIDE, PRESERVATIVE FREE 10 ML: 5 INJECTION INTRAVENOUS at 21:03

## 2021-04-01 RX ADMIN — DEXTROSE AND SODIUM CHLORIDE 125 ML/HR: 5; 900 INJECTION, SOLUTION INTRAVENOUS at 23:00

## 2021-04-01 RX ADMIN — VANCOMYCIN HYDROCHLORIDE 2000 MG: 5 INJECTION, POWDER, LYOPHILIZED, FOR SOLUTION INTRAVENOUS at 11:21

## 2021-04-01 RX ADMIN — NYSTATIN: 100000 POWDER TOPICAL at 17:32

## 2021-04-01 RX ADMIN — METOPROLOL TARTRATE 100 MG: 50 TABLET, FILM COATED ORAL at 21:03

## 2021-04-01 RX ADMIN — DEXTROSE MONOHYDRATE 12.5 G: 25 INJECTION, SOLUTION INTRAVENOUS at 03:10

## 2021-04-01 RX ADMIN — GABAPENTIN 100 MG: 100 CAPSULE ORAL at 21:02

## 2021-04-01 RX ADMIN — IPRATROPIUM BROMIDE AND ALBUTEROL SULFATE 3 ML: 2.5; .5 SOLUTION RESPIRATORY (INHALATION) at 06:51

## 2021-04-01 RX ADMIN — NYSTATIN: 100000 POWDER TOPICAL at 09:31

## 2021-04-01 RX ADMIN — IPRATROPIUM BROMIDE AND ALBUTEROL SULFATE 3 ML: 2.5; .5 SOLUTION RESPIRATORY (INHALATION) at 20:22

## 2021-04-01 RX ADMIN — AZITHROMYCIN MONOHYDRATE 500 MG: 500 INJECTION, POWDER, LYOPHILIZED, FOR SOLUTION INTRAVENOUS at 09:30

## 2021-04-01 RX ADMIN — HEPARIN SODIUM 5000 UNITS: 5000 INJECTION INTRAVENOUS; SUBCUTANEOUS at 09:07

## 2021-04-01 RX ADMIN — HEPARIN SODIUM 5000 UNITS: 5000 INJECTION INTRAVENOUS; SUBCUTANEOUS at 21:02

## 2021-04-01 RX ADMIN — INSULIN ASPART 6 UNITS: 100 INJECTION, SOLUTION INTRAVENOUS; SUBCUTANEOUS at 17:31

## 2021-04-01 RX ADMIN — DEXTROSE AND SODIUM CHLORIDE 125 ML/HR: 5; 900 INJECTION, SOLUTION INTRAVENOUS at 11:21

## 2021-04-01 RX ADMIN — MONTELUKAST SODIUM 10 MG: 10 TABLET, COATED ORAL at 21:02

## 2021-04-01 RX ADMIN — DEXTROSE MONOHYDRATE 12.5 G: 25 INJECTION, SOLUTION INTRAVENOUS at 05:08

## 2021-04-01 RX ADMIN — IPRATROPIUM BROMIDE AND ALBUTEROL SULFATE 3 ML: 2.5; .5 SOLUTION RESPIRATORY (INHALATION) at 10:17

## 2021-04-01 RX ADMIN — PIPERACILLIN SODIUM AND TAZOBACTAM SODIUM 3.38 G: 3; .375 INJECTION, POWDER, LYOPHILIZED, FOR SOLUTION INTRAVENOUS at 11:29

## 2021-04-01 RX ADMIN — IPRATROPIUM BROMIDE AND ALBUTEROL SULFATE 3 ML: 2.5; .5 SOLUTION RESPIRATORY (INHALATION) at 15:40

## 2021-04-01 RX ADMIN — SODIUM CHLORIDE 12 UNITS/HR: 9 INJECTION, SOLUTION INTRAVENOUS at 01:51

## 2021-04-01 RX ADMIN — PIPERACILLIN SODIUM AND TAZOBACTAM SODIUM 3.38 G: 3; .375 INJECTION, POWDER, LYOPHILIZED, FOR SOLUTION INTRAVENOUS at 17:31

## 2021-04-01 NOTE — CONSULTS
Adult Nutrition  Assessment    Patient Name:  Yamileth Slater  YOB: 1960  MRN: [id number]  Admit Date:  3/31/2021    Assessment Date:  4/1/2021    Comments:  Pt admitted with AMS along with weakness and frequent falls.  Her blood glucose was 674 on admit and her HgbA1c was 14--indicating poor DM control.  Pt dxed with hyperosmolar hyperglycemia and insulin drip was started but has since been discontinued and po diet has been started.  Also noted to have Pnemonia and a UTI.  Pt with poor po due to her mentation.  She is 250% of her IBW with a BMI of 50.3 which is compatible with morbid obesity.  Rd will monitor tx plans and will hopefully be able to provide DM education prior to discharge.      Reason for Assessment     Row Name 04/01/21 1429          Reason for Assessment    Reason For Assessment  per organizational policy     Diagnosis  diabetes diagnosis/complications     Identified At Risk by Screening Criteria  need for education         Nutrition/Diet History     Row Name 04/01/21 1313          Nutrition/Diet History    Typical Food/Fluid Intake  Pt fell asleep several times while I was talking with her.  Per staff they did not give her breakfast this am due to decreased mentation. Limited answers to my questions.           Labs/Tests/Procedures/Meds     Row Name 04/01/21 1430          Labs/Procedures/Meds    Lab Results Reviewed  reviewed, pertinent     Lab Results Comments  Gluc 134; Bun 43; Creat 2.30; Alb 2.70; Lipase 152        Diagnostic Tests/Procedures    Diagnostic Test/Procedure Reviewed  reviewed, pertinent     Diagnostic Test/Procedures Comments  IVF; Sodium Bicarb; Insulin drip--now discontinued        Medications    Pertinent Medications Reviewed  reviewed, pertinent     Pertinent Medications Comments  Abx; Ferrous sulfate; SSI; IVF 125cc/hr; sodium Bicarb         Physical Findings     Row Name 04/01/21 1431          Physical Findings    Overall Physical Appearance  on oxygen  therapy;obese         Estimated/Assessed Needs     Row Name 04/01/21 1432          Calculation Measurements    Weight Used For Calculations  69 kg (152 lb 1.9 oz) Adjusted body wt        Estimated/Assessed Needs    Additional Documentation  Additional Requirements (Group);Fluid Requirements (Group);Garfield-St. Jeor Equation (Group);Protein Requirements (Group);Calorie Requirements (Group);KCAL/KG (Group)        Calorie Requirements    Estimated Calorie Need Method  Garfield-St Jeor        Garfield-St. Jeor Equation    RMR (Garfield-St. Jeor Equation)  1203.25     Garfield-St. Jeor Activity Factors  1.4 - 1.5     Activity Factors (Garfield-St. Jeor)  1684.55 - 1804.875        Protein Requirements    Weight Used For Protein Calculations  69 kg (152 lb 1.9 oz)     Est Protein Requirement Amount (gms/kg)  1.0 gm protein     Estimated Protein Requirements (gms/day)  69        Fluid Requirements    Fluid Requirements (mL/day)  1700     Estimated Fluid Requirement Method  RDA Method     RDA Method (mL)  1700         Nutrition Prescription Ordered     Row Name 04/01/21 1433          Nutrition Prescription PO    Current PO Diet  Regular     Fluid Consistency  Thin     Common Modifiers  Cardiac;Consistent Carbohydrate                 Electronically signed by:  Gissel Littlejohn RD  04/01/21 14:40 CDT

## 2021-04-01 NOTE — THERAPY EVALUATION
Patient Name: Yaimleth Slater  : 1960    MRN: [id number]                              Today's Date: 2021     PT Eval  Admit Date: 3/31/2021    Visit Dx:     ICD-10-CM ICD-9-CM   1. Hyperglycemia  R73.9 790.29   2. Urinary tract infection in female  N39.0 599.0   3. Impaired mobility and ADLs  Z74.09 V49.89    Z78.9    4. Impaired functional mobility, balance, gait, and endurance  Z74.09 V49.89     Patient Active Problem List   Diagnosis   • Type 2 diabetes mellitus with diabetic polyneuropathy, with long-term current use of insulin (CMS/[facility])   • Chronic obstructive pulmonary disease (CMS/[facility])   • Low back pain   • Vitamin D deficiency   • Type 2 diabetes mellitus (CMS/[facility])   • Tobacco dependence syndrome   • Surgical follow-up care   • Shoulder joint pain   • Peripheral vascular disease (CMS/[facility])   • Pain   • Neurologic disorder associated with diabetes mellitus (CMS/[facility])   • Morbid obesity with BMI of 50.0-59.9, adult (CMS/[facility])   • Mixed hyperlipidemia   • Kidney stone   • History of colon polyps   • GERD (gastroesophageal reflux disease)   • Excoriated eczema   • Essential hypertension   • Encounter for medication refill   • Dyslipidemia   • Diabetes mellitus (CMS/[facility])   • Coronary artery disease   • Chronic obstructive lung disease (CMS/[facility])   • Chronic folliculitis   • Chest pain   • Mild persistent asthma   • Anxiety states   • 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   • Uncontrolled type 2 diabetes mellitus with complication, with long-term current use of insulin (CMS/[facility])   • Acute renal failure superimposed on chronic kidney disease (CMS/[facility])   • Anemia   • Hypothyroidism   • Carotid artery disease (CMS/[facility])   • Current smoker   • DM type 2 with diabetic peripheral neuropathy (CMS/[facility])   • [place] abuse   • PAD (peripheral artery disease) (CMS/[facility])   • Pneumonia of both lungs due to infectious organism   • S/P CABG x 3   • Intercostal pain   • Venous insufficiency   •  Dyspnea on exertion   • LAFB (left anterior fascicular block)   • Pulmonary hypertension (CMS/HCC)   • Left hip pain   • Neck pain   • Stage 3b chronic kidney disease (CMS/HCC)   • MIKE and COPD overlap syndrome (CMS/[facility])   • Pneumonia due to COVID-19 virus   • CKD (chronic kidney disease) stage 4, GFR 15-29 ml/min (CMS/HCC)   • Morbid obesity (CMS/HCC)   • Type 2 diabetes mellitus with hyperosmolar hyperglycemic state (HHS) (CMS/[facility])   • Metabolic acidosis   • Hyponatremia   • Hyperkalemia   • Hypocalcemia   • Anemia   • Acute cystitis   • Cutaneous candidiasis   • Community acquired pneumonia of right middle lobe of lung     Past Medical History:   Diagnosis Date   • Anxiety    • Carotid artery stenosis    • Chronic obstructive lung disease (CMS/[facility])    • CKD (chronic kidney disease) stage 4, GFR 15-29 ml/min (CMS/[facility])    • Colonic polyp    • Coronary arteriosclerosis    • Diabetes mellitus (CMS/HCC)    • Diabetic neuropathy (CMS/[facility])    • GERD (gastroesophageal reflux disease)    • Hypercholesterolemia    • Hypertension    • Morbid obesity (CMS/[facility])    • Nephrolithiasis    • Peripheral vascular disease (CMS/[facility])    • Sleep apnea    • Substance abuse (CMS/[facility])    • Vitamin D deficiency      Past Surgical History:   Procedure Laterality Date   • CARDIAC CATHETERIZATION N/A 7/14/2020   • CAROTID STENT Left    • COLONOSCOPY     • CORONARY ARTERY BYPASS GRAFT     • CYSTOSCOPY BLADDER STONE LITHOTRIPSY Bilateral      General Information     Row Name 04/01/21 1027          Physical Therapy Time and Intention    Document Type  evaluation  -GB     Mode of Treatment  individual therapy;physical therapy  -GB     Row Name 04/01/21 1027          General Information    Patient Profile Reviewed  yes  -GB     Existing Precautions/Restrictions  fall;oxygen therapy device and L/min  -GB     Barriers to Rehab  cognitive status;medically complex  -GB     Row Name 04/01/21 1027          Living Environment    Lives With  spouse  -GB      Row Name 21 1027          Home Main Entrance    Number of Stairs, Main Entrance  -- denies having steps to enter but ? reliability  -     Row Name 21 1027          Cognition    Orientation Status (Cognition)  oriented to;person;place   -       User Key  (r) = Recorded By, (t) = Taken By, (c) = Cosigned By    Initials Name Provider Type    Gauri Kohli, PT Physical Therapist        Mobility     Row Name 21 1107          Bed Mobility    Bed Mobility  bed mobility (all) activities  -     All Activities, Falls (Bed Mobility)  dependent (less than 25% patient effort);2 person assist  -     Assistive Device (Bed Mobility)  draw sheet;head of bed elevated;bed rails  -     Row Name 21 1107          Transfers    Comment (Transfers)  not safe enough to transfer or try EOB due to limited alertness and participation; became more alert w/ intermittent stim but not enough to get up  -     Row Name 21 1107          Bed-Chair Transfer    Bed-Chair Falls (Transfers)  contact guard  -       User Key  (r) = Recorded By, (t) = Taken By, (c) = Cosigned By    Initials Name Provider Type    Gauri Kohli, PT Physical Therapist        Obj/Interventions     Row Name 21 1044          Range of Motion Comprehensive    Comment, General Range of Motion  very drowsy and only moving partial range w/ assist; limited by habitus to get full hip flx but once supine she could do AAROM WFL hip flx/abd; knee and ankle WFL  -     Row Name 21 1044          Strength Comprehensive (MMT)    Comment, General Manual Muscle Testing (MMT) Assessment  difficulty to determin due to lethargy and periodic responses. She did do AAROM hip, knee, ankle flx/ext and shoulder fl to ~70 deg; slight  and AAROM elbow flx/ext; periodically did attmept to bring LEs up in flx then back down;  -     Row Name 21 1044          Motor Skills    Therapeutic Exercise   hip;ankle  -[facility] Name 04/01/21 1044          Hip (Therapeutic Exercise)    Hip (Therapeutic Exercise)  AAROM (active assistive range of motion)  -     Hip AAROM (Therapeutic Exercise)  bilateral;flexion;extension;aBduction;aDduction;supine;5 repetitions  -[facility] Name 04/01/21 1044          Ankle (Therapeutic Exercise)    Ankle (Therapeutic Exercise)  AAROM (active assistive range of motion)  -     Ankle AAROM (Therapeutic Exercise)  supine;5 repetitions;plantarflexion;dorsiflexion;bilateral  -[facility] Name 04/01/21 1044          Balance    Comment, Balance  did not try EOB due to lethargy and unreliable motor following commands;  -[facility] Name 04/01/21 1044          Sensory Assessment (Somatosensory)    Sensory Assessment (Somatosensory)  sensation intact  -       User Key  (r) = Recorded By, (t) = Taken By, (c) = Cosigned By    Initials Name Provider Type    GB Gauri Anderson, PT Physical Therapist        Goals/Plan     [facility] Name 04/01/21 1052          Bed Mobility Goal 1 (PT)    Activity/Assistive Device (Bed Mobility Goal 1, PT)  bed mobility activities, all  -GB     [place] Level/Cues Needed (Bed Mobility Goal 1, PT)  moderate assist (50-74% patient effort);minimum assist (75% or more patient effort);1 person assist  -GB     Time Frame (Bed Mobility Goal 1, PT)  1 week  -GB     Progress/Outcomes (Bed Mobility Goal 1, PT)  goal not met  -[facility] Name 04/01/21 1052          Transfer Goal 1 (PT)    Activity/Assistive Device (Transfer Goal 1, PT)  bed-to-chair/chair-to-bed  -GB     [place] Level/Cues Needed (Transfer Goal 1, PT)  minimum assist (75% or more patient effort);moderate assist (50-74% patient effort);2 person assist  -GB     Progress/Outcome (Transfer Goal 1, PT)  goal not met  -[facility] Name 04/01/21 1052          Gait Training Goal 1 (PT)    Activity/Assistive Device (Gait Training Goal 1, PT)  gait (walking locomotion)  -GB     [place] Level (Gait  Training Goal 1, PT)  moderate assist (50-74% patient effort);minimum assist (75% or more patient effort);2 person assist  -GB     Time Frame (Gait Training Goal 1, PT)  2 weeks  -GB     Progress/Outcome (Gait Training Goal 1, PT)  goal not met  -GB     Row Name 04/01/21 1052          ROM Goal 1 (PT)    ROM Goal 1 (PT)  AAROM WFL activley for hip, knee and ankle flx ex 20 reps x 2  -GB     Time Frame (ROM Goal 1, PT)  4 days  -GB     Progress/Outcome (ROM Goal 1, PT)  goal not met  -GB       User Key  (r) = Recorded By, (t) = Taken By, (c) = Cosigned By    Initials Name Provider Type    Gauri Kohli, PT Physical Therapist        Clinical Impression     Row Name 04/01/21 1027          Plan of Care Review    Plan of Care Reviewed With  patient  -GB     Progress  no change  -GB     [facility] Name 04/01/21 1027          Therapy Assessment/Plan (PT)    Rehab Potential (PT)  fair, will monitor progress closely  -GB     Criteria for Skilled Interventions Met (PT)  yes  -GB     Row Name 04/01/21 1027          Vital Signs    Pre Systolic BP Rehab  119  -GB     Pre Treatment Diastolic BP  52  -GB     Post Systolic BP Rehab  140  -GB     Post Treatment Diastolic BP  62  -GB     Pretreatment Heart Rate (beats/min)  107  -GB     Posttreatment Heart Rate (beats/min)  107  -GB     Pre SpO2 (%)  98  -GB     O2 Delivery Pre Treatment  nasal cannula  -GB     Post SpO2 (%)  100  -GB     O2 Delivery Post Treatment  nasal cannula  -GB     Pre Patient Position  Supine  -GB     Intra Patient Position  Supine  -GB     Post Patient Position  Supine HOB up 15 deg  -GB     Row Name 04/01/21 1027          Positioning and Restraints    Pre-Treatment Position  in bed  -GB     Post Treatment Position  bed  -GB     In Bed  supine;call light within reach;encouraged to call for assist;patient within staff view  -GB       User Key  (r) = Recorded By, (t) = Taken By, (c) = Cosigned By    Initials Name Provider Type    JUANCARLOS Anderson  Gauri HERNANDEZ PT Physical Therapist        Outcome Measures     Row Name 04/01/21 1056          How much help from another person do you currently need...    Turning from your back to your side while in flat bed without using bedrails?  1  -GB     Moving from lying on back to sitting on the side of a flat bed without bedrails?  1  -GB     Moving to and from a bed to a chair (including a wheelchair)?  1  -GB     Standing up from a chair using your arms (e.g., wheelchair, bedside chair)?  1  -GB     Climbing 3-5 steps with a railing?  1  -GB     Row Name 04/01/21 1056          Functional Assessment    Outcome Measure Options  AM-PAC 6 Clicks Basic Mobility (PT)  -       User Key  (r) = Recorded By, (t) = Taken By, (c) = Cosigned By    Initials Name Provider Type    Gauri Kohli PT Physical Therapist        Physical Therapy Education                 Title: PT OT SLP Therapies (In Progress)     Topic: Physical Therapy (In Progress)     Point: Mobility training (In Progress)     Learning Progress Summary           Patient Acceptance, D,E, NR by JUANCARLOS at 4/1/2021 1056    Comment: POC: instructions for eval/ex to encourage active  mobility                   Point: Home exercise program (Not Started)     Learner Progress:  Not documented in this visit.          Point: Body mechanics (Not Started)     Learner Progress:  Not documented in this visit.          Point: Precautions (Not Started)     Learner Progress:  Not documented in this visit.                      User Key     Initials Effective Dates Name Provider Type Discipline     04/03/18 -  Gauri Anderson PT Physical Therapist PT              PT Recommendation and Plan  Planned Therapy Interventions (PT): bed mobility training, gait training, home exercise program, motor coordination training, strengthening, ROM (range of motion), postural re-education, transfer training, neuromuscular re-education  Plan of Care Reviewed With: spouse  Progress:  no change  Outcome Summary: PT eval with patient; She is lethargic but did awaken w/ intermittent stim to help w AAROM LEs and AROM within her limits for UEs; She is limited in range in supine by pannus but did intiate hip/knee flx once placed insupine but does not maintain activity without cuing and without breaks from requiest then revisit. She did answer question re: home but unsure if accurate re steps; Pt can benefit from skiled therapy to progress mobilty and activity level. She will likely need rehab course before attmempt return home as she is dependient for care and mobility currently. LTACH, rehab to home before d/c.     Time Calculation:   PT Charges     Row Name 04/01/21 1104 04/01/21 1101          Time Calculation    Start Time  1027  -GB  1027  -GB     Stop Time  1106  -GB  --     Time Calculation (min)  39 min  -GB  --     PT Received On  04/01/21 -GB  --     PT Goal Re-Cert Due Date  04/10/21  -GB  --       User Key  (r) = Recorded By, (t) = Taken By, (c) = Cosigned By    Initials Name Provider Type    Gauri Kohli, PT Physical Therapist        Therapy Charges for Today     Code Description Service Date Service Provider Modifiers Qty    [id number] HC PT CECILIO LOW COMPLEXITY 3 4/1/2021 Gauri Anderson, PT GP 1          PT G-Codes  Outcome Measure Options: AM-PAC 6 Clicks Basic Mobility (PT)    Gauri Anderson PT  4/1/2021

## 2021-04-01 NOTE — PROGRESS NOTES
FAMILY MEDICINE DAILY PROGRESS NOTE  NAME: Yamileth Slater  : 1960  MRN: [id number]     LOS: 0 days     PROVIDER OF SERVICE: Nirmal Calvillo MD    Chief Complaint: Type 2 diabetes mellitus with hyperosmolar hyperglycemic state (HHS) (CMS/[facility])    Subjective:     Interval History:  History taken from: chart RN  Patient lethargic overnight. Arousable to sternal rub but unable to stay awake    Feeding: Cardiac consistent carb but unable to eat due to lethargy  Analgesia: Tylenol  Sedation: N/A  Thromboembolic prophylaxis: Heparin  Head up to 30 degrees  Ulcer ppx: Protonix  Glycemic control: Sliding scale  Spont. breathing trial: N/A  Bowel regimen: N/A   Indwelling catheter: Urethral catheter  Deescalating antibiotics:Ceftriaxone and azithromycin       Review of Systems:   Review of Systems   Reason unable to perform ROS: Lethargy.       Objective:     Vital Signs  Temp:  [96.8 °F (36 °C)-99.3 °F (37.4 °C)] 99.3 °F (37.4 °C)  Heart Rate:  [] 101  Resp:  [14-32] 18  BP: (100-167)/() 128/62    Physical Exam  Physical Exam  Constitutional:       General: She is not in acute distress.     Appearance: She is well-developed.   HENT:      Head: Normocephalic and atraumatic.      Right Ear: External ear normal.      Left Ear: External ear normal.      Nose: Nose normal.   Cardiovascular:      Rate and Rhythm: Normal rate and regular rhythm.      Heart sounds: Normal heart sounds. No murmur heard.   No friction rub. No gallop.    Pulmonary:      Effort: Pulmonary effort is normal. No respiratory distress.      Breath sounds: Normal breath sounds. No wheezing or rales.   Abdominal:      General: Bowel sounds are normal. There is no distension.      Palpations: Abdomen is soft.      Tenderness: There is no abdominal tenderness.   Musculoskeletal:      Right lower leg: No edema.      Left lower leg: No edema.   Skin:     General: Skin is warm.      Findings: No erythema.   Neurological:      Mental  Status: She is lethargic.         Medication Review    Current Facility-Administered Medications:   •  acetaminophen (TYLENOL) tablet 650 mg, 650 mg, Oral, Q6H PRN, Carline Coffey MD  •  albuterol (PROVENTIL) nebulizer solution 0.083% 2.5 mg/3mL, 2.5 mg, Nebulization, Q4H PRN, Carline Coffey MD  •  atorvastatin (LIPITOR) tablet 40 mg, 40 mg, Oral, Daily, Carline Coffey MD  •  AZITHROMYCIN 500 MG/250 ML 0.9% NS IVPB (vial-mate), 500 mg, Intravenous, Q24H, Umesh Giraldo III, MD, 500 mg at 04/01/21 0930  •  cefTRIAXone (ROCEPHIN) 1 g/100 mL 0.9% NS (MBP), 1 g, Intravenous, Q24H, Carline Coffey MD  •  clopidogrel (PLAVIX) tablet 75 mg, 75 mg, Oral, Daily, Carline Coffey MD  •  cyclobenzaprine (FLEXERIL) tablet 10 mg, 10 mg, Oral, BID PRN, Carline Coffey MD  •  dextrose (D50W) 25 g/ 50mL Intravenous Solution 25 g, 25 g, Intravenous, Q15 Min PRN, Umesh Giraldo III, MD  •  dextrose (GLUTOSE) oral gel 15 g, 15 g, Oral, Q15 Min PRN, Umesh Giraldo III, MD  •  dextrose 5 % and sodium chloride 0.45 % with KCl 10 mEq/L infusion, 125 mL/hr, Intravenous, Continuous, Umesh Giraldo III, MD, Last Rate: 125 mL/hr at 04/01/21 0752, 125 mL/hr at 04/01/21 0752  •  ferrous sulfate EC tablet 324 mg, 324 mg, Oral, Daily With Breakfast, Carline Coffey MD  •  gabapentin (NEURONTIN) capsule 100 mg, 100 mg, Oral, Q8H, Carline Coffey MD, 100 mg at 04/01/21 0509  •  glucagon (human recombinant) (GLUCAGEN DIAGNOSTIC) injection 1 mg, 1 mg, Subcutaneous, Q15 Min PRN, Umesh Giraldo III, MD  •  heparin (porcine) 5000 UNIT/ML injection 5,000 Units, 5,000 Units, Subcutaneous, Q8H, Umesh Giraldo III, MD  •  insulin aspart (novoLOG) injection 0-9 Units, 0-9 Units, Subcutaneous, TID AC, Umesh Giraldo III, MD, Stopped at 04/01/21 0700  •  ipratropium-albuterol (DUO-NEB) nebulizer solution 3 mL, 3 mL, Nebulization, 4x Daily - RT, Carline Coffey MD, 3 mL at 04/01/21 0651  •  isosorbide mononitrate  (IMDUR) 24 hr tablet 30 mg, 30 mg, Oral, Daily, Carline Coffey MD  •  metoprolol tartrate (LOPRESSOR) tablet 100 mg, 100 mg, Oral, Q12H, Carline Coffey MD, 100 mg at 03/31/21 2203  •  montelukast (SINGULAIR) tablet 10 mg, 10 mg, Oral, Nightly, Carline Coffey MD, 10 mg at 03/31/21 2202  •  nitroglycerin (NITROSTAT) SL tablet 0.4 mg, 0.4 mg, Sublingual, PRN, Carline Coffey MD  •  nystatin (MYCOSTATIN) powder, , Topical, TID, Carline Coffey MD, Given at 04/01/21 0931  •  ondansetron ODT (ZOFRAN-ODT) disintegrating tablet 4 mg, 4 mg, Oral, Q8H PRN, Carline Coffey MD  •  oxybutynin XL (DITROPAN-XL) 24 hr tablet 5 mg, 5 mg, Oral, Daily, Carline Coffey MD  •  pantoprazole (PROTONIX) EC tablet 40 mg, 40 mg, Oral, Nightly, Carline Coffey MD, 40 mg at 03/31/21 2203  •  potassium chloride (MICRO-K) CR capsule 10 mEq, 10 mEq, Oral, Daily, Carline Coffey MD  •  promethazine (PHENERGAN) tablet 25 mg, 25 mg, Oral, Q6H PRN, Carline Coffey MD  •  sodium bicarbonate tablet 325 mg, 325 mg, Oral, BID, Carline Coffey MD, 325 mg at 03/31/21 2202  •  sodium chloride 0.9 % flush 10 mL, 10 mL, Intravenous, PRN, Abel Bryan MD  •  Insert peripheral IV, , , Once **AND** sodium chloride 0.9 % flush 10 mL, 10 mL, Intravenous, PRN, Irasema Hall APRN  •  sodium chloride 0.9 % flush 10 mL, 10 mL, Intravenous, Q12H, Carline Coffey MD, 10 mL at 03/31/21 2200  •  sodium chloride 0.9 % flush 10 mL, 10 mL, Intravenous, PRN, Carline Coffey MD     Diagnostic Data    Lab Results (last 24 hours)     Procedure Component Value Units Date/Time    S. Pneumo Ag Urine or CSF - Urine, Urine, Clean Catch [[id number]]  (Normal) Collected: 04/01/21 0142    Specimen: Urine, Clean Catch Updated: 04/01/21 0925     Strep Pneumo Ag Negative    Legionella Antigen, Urine - Urine, Urine, Clean Catch [[id number]]  (Normal) Collected: 04/01/21 0142    Specimen: Urine, Clean Catch Updated: 04/01/21 0925     LEGIONELLA  ANTIGEN, URINE Negative    POC Glucose Once [[id number]]  (Abnormal) Collected: 04/01/21 0855    Specimen: Blood Updated: 04/01/21 0910     Glucose 134 mg/dL      Comment: RN NotifiedOperator: [id number] REDDEN AUSTINMeter ID: [id number]       Respiratory Panel, PCR (WITHOUT COVID) - Swab, Nasopharynx [[id number]] Collected: 03/31/21 1819    Specimen: Swab from Nasopharynx Updated: 04/01/21 0910    POC Glucose Once [[id number]]  (Normal) Collected: 04/01/21 0616    Specimen: Blood Updated: 04/01/21 0643     Glucose 128 mg/dL      Comment: : [id number] ROSANNE TRACYMeter ID: [id number]       Blood Gas, Arterial - [[id number]]  (Abnormal) Collected: 04/01/21 0530    Specimen: Arterial Blood Updated: 04/01/21 0536     Site OTHER     Shadi's Test N/A     pH, Arterial 7.209 pH units      Comment: 85 Value below critical limit        pCO2, Arterial 47.6 mm Hg      Comment: 83 Value above reference range        pO2, Arterial 113.0 mm Hg      Comment: 83 Value above reference range        HCO3, Arterial 19.0 mmol/L      Comment: 84 Value below reference range        Base Excess, Arterial -8.6 mmol/L      Comment: 84 Value below reference range        O2 Saturation, Arterial 98.5 %      Barometric Pressure for Blood Gas 757 mmHg      Modality Nasal Cannula     Flow Rate 3.0 lpm      Ventilator Mode NA     Collected by RT     Comment: Meter: [id number]     :  [id number]       POC Glucose Once [[id number]]  (Normal) Collected: 04/01/21 0503    Specimen: Blood Updated: 04/01/21 0515     Glucose 89 mg/dL      Comment: : [id number] ROSANNE TRACYMeter ID: [id number]       Basic Metabolic Panel [[id number]]  (Abnormal) Collected: 04/01/21 0345    Specimen: Blood Updated: 04/01/21 0509     Glucose 103 mg/dL      BUN 43 mg/dL      Creatinine 2.30 mg/dL      Sodium 137 mmol/L      Potassium 4.4 mmol/L      Chloride 108 mmol/L      CO2 20.0 mmol/L      Calcium 8.0 mg/dL      eGFR Non African Amer 21  mL/min/1.73      BUN/Creatinine Ratio 18.7     Anion Gap 9.0 mmol/L     Narrative:      GFR Normal >60  Chronic Kidney Disease <60  Kidney Failure <15      Phosphorus [[id number]]  (Normal) Collected: 04/01/21 0345    Specimen: Blood Updated: 04/01/21 0509     Phosphorus 3.8 mg/dL     Magnesium [[id number]]  (Normal) Collected: 04/01/21 0345    Specimen: Blood Updated: 04/01/21 0509     Magnesium 1.8 mg/dL     Osmolality, Serum [[id number]]  (Abnormal) Collected: 04/01/21 0344    Specimen: Blood Updated: 04/01/21 0458     Osmolality 304 mOsm/kg     POC Glucose Once [[id number]]  (Normal) Collected: 04/01/21 0406    Specimen: Blood Updated: 04/01/21 0418     Glucose 117 mg/dL      Comment: : [id number] ROSANNE TRACYMeter ID: [id number]       POC Glucose Once [[id number]]  (Normal) Collected: 04/01/21 0307    Specimen: Blood Updated: 04/01/21 0319     Glucose 86 mg/dL      Comment: : [id number] ROSANNE TRACYMeter ID: [id number]       POC Glucose Once [[id number]]  (Abnormal) Collected: 04/01/21 0154    Specimen: Blood Updated: 04/01/21 0233     Glucose 134 mg/dL      Comment: : [id number] ROSANNE TRACYMeter ID: [id number]       POC Glucose Once [[id number]]  (Normal) Collected: 04/01/21 0102    Specimen: Blood Updated: 04/01/21 0233     Glucose 110 mg/dL      Comment: : [id number] ROSANNE TRACYMeter ID: [id number]       POC Glucose Once [[id number]]  (Abnormal) Collected: 03/31/21 2355    Specimen: Blood Updated: 04/01/21 0233     Glucose 160 mg/dL      Comment: : [id number] ROSANNE TRACYMeter ID: [id number]       POC Glucose Once [[id number]]  (Abnormal) Collected: 03/31/21 2300    Specimen: Blood Updated: 04/01/21 0233     Glucose 191 mg/dL      Comment: RN NotifiedOperator: [id number] ROSANNE TRACYMeter ID: [id number]       POC Glucose Once [[id number]]  (Abnormal) Collected: 03/31/21 2157    Specimen: Blood Updated: 04/01/21 0233     Glucose 280 mg/dL      Comment: :  [phone] ROSANNE TRACYMeter ID: [id number]       POC Glucose Once [[id number]]  (Abnormal) Collected: 03/31/21 2059    Specimen: Blood Updated: 04/01/21 0233     Glucose 304 mg/dL      Comment: : [phone] ROSANNE TRACYMeter ID: [id number]       POC Glucose Once [[id number]]  (Abnormal) Collected: 03/31/21 2008    Specimen: Blood Updated: 04/01/21 0232     Glucose 325 mg/dL      Comment: : [id number] ROSANNE TRACYMeter ID: [id number]       Urinalysis, Microscopic Only - Urine, Clean Catch [[id number]]  (Abnormal) Collected: 04/01/21 0142    Specimen: Urine, Clean Catch Updated: 04/01/21 0224     RBC, UA 3-5 /HPF      WBC, UA Too Numerous to Count /HPF      Bacteria, UA None Seen /HPF      Squamous Epithelial Cells, UA 0-2 /HPF      Hyaline Casts, UA 7-12 /LPF      Methodology Manual Light Microscopy    Urine Culture - Urine, Urine, Clean Catch [[id number]] Collected: 04/01/21 0142    Specimen: Urine, Clean Catch Updated: 04/01/21 0224    Osmolality, Serum [[id number]]  (Abnormal) Collected: 04/01/21 0126    Specimen: Blood Updated: 04/01/21 0216     Osmolality 305 mOsm/kg     Urinalysis With Culture If Indicated - Urine, Clean Catch [[id number]]  (Abnormal) Collected: 04/01/21 0142    Specimen: Urine, Clean Catch Updated: 04/01/21 0159     Color, UA Yellow     Appearance, UA Turbid     pH, UA 5.5     Specific Gravity, UA 1.020     Glucose, UA >=1000 mg/dL (3+)     Ketones, UA Negative     Bilirubin, UA Negative     Blood, UA Small (1+)     Protein,  mg/dL (2+)     Leuk Esterase, UA Large (3+)     Nitrite, UA Positive     Urobilinogen, UA 0.2 E.U./dL    Comprehensive Metabolic Panel [[id number]]  (Abnormal) Collected: 04/01/21 0126    Specimen: Blood Updated: 04/01/21 0155     Glucose 53 mg/dL      BUN 41 mg/dL      Creatinine 2.26 mg/dL      Sodium 140 mmol/L      Potassium 4.2 mmol/L      Chloride 109 mmol/L      CO2 21.0 mmol/L      Calcium 8.4 mg/dL      Total Protein 6.7 g/dL      Albumin  2.70 g/dL      ALT (SGPT) 8 U/L      AST (SGOT) 9 U/L      Alkaline Phosphatase 142 U/L      Total Bilirubin <0.2 mg/dL      eGFR Non African Amer 22 mL/min/1.73      Globulin 4.0 gm/dL      A/G Ratio 0.7 g/dL      BUN/Creatinine Ratio 18.1     Anion Gap 10.0 mmol/L     Narrative:      GFR Normal >60  Chronic Kidney Disease <60  Kidney Failure <15      Folate [[id number]]  (Normal) Collected: 03/31/21 1949    Specimen: Blood Updated: 04/01/21 0116     Folate 5.63 ng/mL     Narrative:      Results may be falsely increased if patient taking Biotin.      Vitamin B12 [[id number]]  (Normal) Collected: 03/31/21 1949    Specimen: Blood Updated: 04/01/21 0116     Vitamin B-12 326 pg/mL     Narrative:      Results may be falsely increased if patient taking Biotin.      Basic Metabolic Panel [[id number]]  (Abnormal) Collected: 03/31/21 2350    Specimen: Blood Updated: 04/01/21 0102     Glucose 58 mg/dL      BUN 41 mg/dL      Creatinine 2.23 mg/dL      Sodium 141 mmol/L      Potassium 3.8 mmol/L      Comment: Slight hemolysis detected by analyzer. Results may be affected.        Chloride 110 mmol/L      CO2 21.0 mmol/L      Calcium 8.3 mg/dL      eGFR Non African Amer 22 mL/min/1.73      BUN/Creatinine Ratio 18.4     Anion Gap 10.0 mmol/L     Narrative:      GFR Normal >60  Chronic Kidney Disease <60  Kidney Failure <15      Phosphorus [[id number]]  (Normal) Collected: 03/31/21 2350    Specimen: Blood Updated: 04/01/21 0102     Phosphorus 3.4 mg/dL     Magnesium [[id number]]  (Normal) Collected: 03/31/21 2350    Specimen: Blood Updated: 04/01/21 0102     Magnesium 2.0 mg/dL     Comprehensive Metabolic Panel [[id number]]  (Abnormal) Collected: 03/31/21 2141    Specimen: Blood Updated: 03/31/21 2217     Glucose 130 mg/dL      BUN 41 mg/dL      Creatinine 2.20 mg/dL      Sodium 142 mmol/L      Potassium 4.0 mmol/L      Chloride 110 mmol/L      CO2 21.0 mmol/L      Calcium 8.4 mg/dL      Total Protein 6.6 g/dL      Albumin 2.70 g/dL       ALT (SGPT) 8 U/L      AST (SGOT) 8 U/L      Alkaline Phosphatase 165 U/L      Total Bilirubin <0.2 mg/dL      eGFR Non African Amer 23 mL/min/1.73      Globulin 3.9 gm/dL      A/G Ratio 0.7 g/dL      BUN/Creatinine Ratio 18.6     Anion Gap 11.0 mmol/L     Narrative:      GFR Normal >60  Chronic Kidney Disease <60  Kidney Failure <15      CBC & Differential [[id number]]  (Abnormal) Collected: 03/31/21 2141    Specimen: Blood Updated: 03/31/21 2154    Narrative:      The following orders were created for panel order CBC & Differential.  Procedure                               Abnormality         Status                     ---------                               -----------         ------                     CBC Auto Differential[[id number]]        Abnormal            Final result                 Please view results for these tests on the individual orders.    CBC Auto Differential [[id number]]  (Abnormal) Collected: 03/31/21 2141    Specimen: Blood Updated: 03/31/21 2154     WBC 11.45 10*3/mm3      RBC 3.65 10*6/mm3      Hemoglobin 10.2 g/dL      Hematocrit 32.2 %      MCV 88.2 fL      MCH 27.9 pg      MCHC 31.7 g/dL      RDW 15.4 %      RDW-SD 49.0 fl      MPV 9.3 fL      Platelets 242 10*3/mm3      Neutrophil % 73.2 %      Lymphocyte % 20.3 %      Monocyte % 3.8 %      Eosinophil % 1.7 %      Basophil % 0.4 %      Immature Grans % 0.6 %      Neutrophils, Absolute 8.38 10*3/mm3      Lymphocytes, Absolute 2.32 10*3/mm3      Monocytes, Absolute 0.44 10*3/mm3      Eosinophils, Absolute 0.19 10*3/mm3      Basophils, Absolute 0.05 10*3/mm3      Immature Grans, Absolute 0.07 10*3/mm3      nRBC 0.0 /100 WBC     Ferritin [[id number]]  (Normal) Collected: 03/31/21 1949    Specimen: Blood Updated: 03/31/21 2115     Ferritin 53.52 ng/mL     Narrative:      Results may be falsely decreased if patient taking Biotin.      Iron Profile [[id number]]  (Abnormal) Collected: 03/31/21 1949    Specimen: Blood Updated: 03/31/21 2108     Iron  14 mcg/dL      Iron Saturation 6 %      Transferrin 158 mg/dL      TIBC 235 mcg/dL     Troponin [[id number]]  (Normal) Collected: 03/31/21 1949    Specimen: Blood Updated: 03/31/21 2045     Troponin T <0.010 ng/mL     Narrative:      Troponin T Reference Range:  <= 0.03 ng/mL-   Negative for AMI  >0.03 ng/mL-     Abnormal for myocardial necrosis.  Clinicians would have to utilize clinical acumen, EKG, Troponin and serial changes to determine if it is an Acute Myocardial Infarction or myocardial injury due to an underlying chronic condition.       Results may be falsely decreased if patient taking Biotin.      Osmolality, Serum [[id number]]  (Abnormal) Collected: 03/31/21 1949    Specimen: Blood Updated: 03/31/21 2030     Osmolality 324 mOsm/kg     Basic Metabolic Panel [[id number]]  (Abnormal) Collected: 03/31/21 1949    Specimen: Blood Updated: 03/31/21 2015     Glucose 280 mg/dL      BUN 45 mg/dL      Creatinine 2.37 mg/dL      Sodium 137 mmol/L      Potassium 4.1 mmol/L      Chloride 107 mmol/L      CO2 21.0 mmol/L      Calcium 7.7 mg/dL      eGFR Non African Amer 21 mL/min/1.73      BUN/Creatinine Ratio 19.0     Anion Gap 9.0 mmol/L     Narrative:      GFR Normal >60  Chronic Kidney Disease <60  Kidney Failure <15      Phosphorus [[id number]]  (Normal) Collected: 03/31/21 1949    Specimen: Blood Updated: 03/31/21 2013     Phosphorus 3.6 mg/dL     Magnesium [[id number]]  (Normal) Collected: 03/31/21 1949    Specimen: Blood Updated: 03/31/21 2013     Magnesium 1.9 mg/dL     COVID-19 and FLU A/B PCR - Swab, Nasopharynx [[id number]]  (Normal) Collected: 03/31/21 1819    Specimen: Swab from Nasopharynx Updated: 03/31/21 1841     COVID19 Not Detected     Influenza A PCR Not Detected     Influenza B PCR Not Detected    Narrative:      Fact sheet for providers: https://www.fda.gov/media/880854/download    Fact sheet for patients: https://www.fda.gov/media/913722/download    Test performed by PCR.    Blood Gas, Arterial -  [[id number]]  (Abnormal) Collected: 03/31/21 1739    Specimen: Arterial Blood Updated: 03/31/21 1755     Site Left Radial     Shadi's Test N/A     pH, Arterial 7.250 pH units      Comment: 84 Value below reference range        pCO2, Arterial 43.8 mm Hg      pO2, Arterial 111.0 mm Hg      Comment: 83 Value above reference range        HCO3, Arterial 19.2 mmol/L      Comment: 84 Value below reference range        Base Excess, Arterial -7.7 mmol/L      Comment: 84 Value below reference range        O2 Saturation, Arterial 98.5 %      Barometric Pressure for Blood Gas 754 mmHg      Modality Nasal Cannula     Flow Rate 2.0 lpm      Ventilator Mode NA     Collected by RT     Comment: Meter: [id number]     :  [id number]       Hemoglobin A1c [[id number]]  (Abnormal) Collected: 03/31/21 1441    Specimen: Blood Updated: 03/31/21 1744     Hemoglobin A1C 14.00 %     Narrative:      Hemoglobin A1C Ranges:    Increased Risk for Diabetes  5.7% to 6.4%  Diabetes                     >= 6.5%  Diabetic Goal                < 7.0%    Phosphorus [[id number]]  (Normal) Collected: 03/31/21 1442    Specimen: Blood Updated: 03/31/21 1740     Phosphorus 3.9 mg/dL     Ketone Bodies, Serum (Not performed at [facility]) [[id number]]  (Abnormal) Collected: 03/31/21 1611    Specimen: Blood Updated: 03/31/21 1623    Narrative:      The following orders were created for panel order Ketone Bodies, Serum (Not performed at [facility]).  Procedure                               Abnormality         Status                     ---------                               -----------         ------                     Acetone[[id number]]                      Abnormal            Final result                 Please view results for these tests on the individual orders.    Acetone [[id number]]  (Abnormal) Collected: 03/31/21 1611    Specimen: Blood Updated: 03/31/21 1623     Acetone Small    Comprehensive Metabolic Panel [[id number]]  (Abnormal) Collected: 03/31/21  1442    Specimen: Blood Updated: 03/31/21 1502     Glucose 674 mg/dL      BUN 51 mg/dL      Creatinine 2.65 mg/dL      Sodium 129 mmol/L      Potassium 6.0 mmol/L      Chloride 99 mmol/L      CO2 21.0 mmol/L      Calcium 8.4 mg/dL      Total Protein 6.5 g/dL      Albumin 2.90 g/dL      ALT (SGPT) 6 U/L      AST (SGOT) 10 U/L      Alkaline Phosphatase 260 U/L      Total Bilirubin <0.2 mg/dL      eGFR Non African Amer 18 mL/min/1.73      Globulin 3.6 gm/dL      A/G Ratio 0.8 g/dL      BUN/Creatinine Ratio 19.2     Anion Gap 9.0 mmol/L     Narrative:      GFR Normal >60  Chronic Kidney Disease <60  Kidney Failure <15      Lipase [[id number]]  (Abnormal) Collected: 03/31/21 1442    Specimen: Blood Updated: 03/31/21 1458     Lipase 152 U/L     CK [[id number]]  (Normal) Collected: 03/31/21 1442    Specimen: Blood Updated: 03/31/21 1458     Creatine Kinase 41 U/L     Troponin [[id number]]  (Normal) Collected: 03/31/21 1442    Specimen: Blood Updated: 03/31/21 1458     Troponin T 0.014 ng/mL     Narrative:      Troponin T Reference Range:  <= 0.03 ng/mL-   Negative for AMI  >0.03 ng/mL-     Abnormal for myocardial necrosis.  Clinicians would have to utilize clinical acumen, EKG, Troponin and serial changes to determine if it is an Acute Myocardial Infarction or myocardial injury due to an underlying chronic condition.       Results may be falsely decreased if patient taking Biotin.      Ethanol [[id number]] Collected: 03/31/21 1442    Specimen: Blood Updated: 03/31/21 1458     Ethanol <10 mg/dL      Ethanol % <0.010 %     Magnesium [[id number]]  (Normal) Collected: 03/31/21 1442    Specimen: Blood Updated: 03/31/21 1458     Magnesium 2.0 mg/dL     BNP [[id number]]  (Normal) Collected: 03/31/21 1442    Specimen: Blood Updated: 03/31/21 1456     proBNP 795.8 pg/mL     Narrative:      Among patients with dyspnea, NT-proBNP is highly sensitive for the detection of acute congestive heart failure. In addition NT-proBNP of <300 pg/ml  effectively rules out acute congestive heart failure with 99% negative predictive value.    Results may be falsely decreased if patient taking Biotin.      Protime-INR [[id number]]  (Normal) Collected: 03/31/21 1258    Specimen: Blood Updated: 03/31/21 1449     Protime 13.7 Seconds      INR 1.01    Narrative:      Therapeutic range for most indications is 2.0-3.0 INR,  or 2.5-3.5 for mechanical heart valves.    CBC & Differential [[id number]]  (Abnormal) Collected: 03/31/21 1441    Specimen: Blood Updated: 03/31/21 1445    Narrative:      The following orders were created for panel order CBC & Differential.  Procedure                               Abnormality         Status                     ---------                               -----------         ------                     CBC Auto Differential[[id number]]        Abnormal            Final result                 Please view results for these tests on the individual orders.    CBC Auto Differential [[id number]]  (Abnormal) Collected: 03/31/21 1441    Specimen: Blood Updated: 03/31/21 1445     WBC 12.93 10*3/mm3      RBC 3.45 10*6/mm3      Hemoglobin 9.7 g/dL      Hematocrit 30.2 %      MCV 87.5 fL      MCH 28.1 pg      MCHC 32.1 g/dL      RDW 15.1 %      RDW-SD 48.4 fl      MPV 10.0 fL      Platelets 264 10*3/mm3      Neutrophil % 78.9 %      Lymphocyte % 13.8 %      Monocyte % 4.8 %      Eosinophil % 1.5 %      Basophil % 0.5 %      Immature Grans % 0.5 %      Neutrophils, Absolute 10.19 10*3/mm3      Lymphocytes, Absolute 1.79 10*3/mm3      Monocytes, Absolute 0.62 10*3/mm3      Eosinophils, Absolute 0.20 10*3/mm3      Basophils, Absolute 0.06 10*3/mm3      Immature Grans, Absolute 0.07 10*3/mm3      nRBC 0.0 /100 WBC            Imaging Results (Last 24 Hours)     ** No results found for the last 24 hours. **          I reviewed the patient's new clinical results.    Assessment/Plan:     Active Hospital Problems    Diagnosis    • **Type 2 diabetes mellitus with  hyperosmolar hyperglycemic state (HHS) (CMS/[facility])      -IV fluids  -Monitoring glucose  -Replete glucose and potassium as needed     • Cutaneous candidiasis      - Nystatin     • Community acquired pneumonia of right middle lobe of lung      - Confirmed on CXR  - Rocephin and azithromycin  - Atypicals  - Procalcitonin     • Metabolic acidosis      -Underlying HHS  -No anion gap     • Hyponatremia      -IVF     • Hyperkalemia      -We will monitor and replace         • Hypocalcemia      - Will continue to monitor     • Anemia      -Iron studies indicative of mixed iron deficiency and chronic disease  -Continue home ferrous sulfate       • Acute cystitis      -IV Rocephin 3-day course  -Follow urine culture     • Acute renal failure superimposed on chronic kidney disease (CMS/[facility] [facility])      -Baseline creatinine 1.9  -Continue IV fluids  -Hold nephrotoxic drugs      • Chronic obstructive lung disease (CMS/[facility])      -O2 supplementation  -Home inhalers as needed  -DuoNebs  -CPAP at night     • GERD (gastroesophageal reflux disease)      - IV protonix qday     • Coronary artery disease      -Continue home meds     • Current smoker      -Patch           DVT prophylaxis: Heparin  Code Status and Medical Interventions:   Ordered at: 03/31/21 1811     Code Status:    CPR     Medical Interventions (Level of Support Prior to Arrest):    Full       Plan for disposition:Where: current living arrangements and When:  3-4 days      Time: 15 min           This document has been electronically signed by Nirmal Calvillo MD on April 1, 2021 09:37 CDT

## 2021-04-01 NOTE — NURSING NOTE
Attempted to call  wesley to set up a code word and it went straight to voicemail. Left voicemail for him to call us back. Primary nurse Aguilar RN to get in touch with  so we can update family after a code word is established

## 2021-04-01 NOTE — SIGNIFICANT NOTE
I was informed by CCU nurse that patient's hypoglyemic episodes of 58 and 53 do not correlate with POC glucose at bedside, which have been 86, 87. Per nurse, patient's lowest blood glucose has been 86. For now she is on Insulin drip with D5 & 0.45% normal saline. If she is unable to eat, we will plan on continuing the Insulin drip. If she is able to eat, then we can begin the bridge with subcutaneous insulin. I will order a repeat ABG              This document has been electronically signed by Carline Coffey MD on April 1, 2021 05:36 CDT

## 2021-04-01 NOTE — NURSING NOTE
Dr. Dupont here to assess pt, new orders received, insulin drip stopped.   Notified of u/a and pt somnolence.  See new orders.

## 2021-04-01 NOTE — PLAN OF CARE
Problem: Adult Inpatient Plan of Care  Goal: Plan of Care Review  Outcome: Ongoing, Not Progressing  Flowsheets (Taken 4/1/2021 1715)  Progress: no change  Outcome Summary: vss, off insulin drip since previous shift, on bipap 30%, lethargic   Goal Outcome Evaluation:     Progress: no change  Outcome Summary: vss, off insulin drip since previous shift, on bipap 30%, lethargic

## 2021-04-01 NOTE — SIGNIFICANT NOTE
04/01/21 1016   OTHER   Discipline occupational therapist   Rehab Time/Intention   Session Not Performed other (see comments)   Recommendation   OT - Next Appointment 04/02/21   OT evaluation attempted this morning. Pt is very lethargic, and unable to stay awake for evaluation. Not able to answer orientation questions appropriately. Will follow up tomorrow.

## 2021-04-01 NOTE — PLAN OF CARE
Problem: Noninvasive Ventilation Acute  Goal: Effective Unassisted Ventilation and Oxygenation  Outcome: Ongoing, Progressing   Goal Outcome Evaluation:      Pt tolerated Bipap well, after several desat episodes with what appeared to be sleep apnea. Continue to monitor.

## 2021-04-01 NOTE — PROGRESS NOTES
Discharge Planning Assessment  [facility]     Patient Name: Yamileth Slater  MRN: [id number]  Today's Date: 4/1/2021    Admit Date: 3/31/2021    Discharge Needs Assessment     Row Name 04/01/21 1227       Living Environment    Lives With  spouse    Current Living Arrangements  home/apartment/condo    Primary Care Provided by  self    Provides Primary Care For  no one, unable/limited ability to care for self    Family Caregiver if Needed  spouse    Family Caregiver Names  Huy Slater    Quality of Family Relationships  supportive       Resource/Environmental Concerns    Transportation Concerns  car, none       Transition Planning    Patient/Family Anticipates Transition to  inpatient rehabilitation facility    Transportation Anticipated  family or friend will provide       Discharge Needs Assessment    Readmission Within the Last 30 Days  no previous admission in last 30 days    Equipment Currently Used at Home  walker, standard;rollator;cane, straight;glucometer;grab bar;nebulizer;oxygen;cpap    Concerns to be Addressed  adjustment to diagnosis/illness    Anticipated Changes Related to Illness  inability to care for self    Current Discharge Risk  chronically ill        Discharge Plan     Row Name 04/01/21 1239       Plan    Plan  pending hospital course    Provided Post Acute Provider List?  Yes    Post Acute Provider List  Long Term Acute Care    Delivered To  Support Person    Support Person  Huy Slater    Method of Delivery  Telephone    Plan Comments  Lace assesment completed via phone with Huy Slater, pts spouse, due to pt condition. per Mr Slater he assist pt with bathing as needed and assited with getting up/down from seated position. states she recently broke her wrist. states pt managed her own meds. they live in handicapp accessible apartment. pt has a home O2 concentrator that she purchased from a neighbor after her O2 was removed due to her not qualifying. she doesnt have portable O2. he  also states she has CPAP- which is currently broke and he plans to take to [facility] to have CPAP checked. pt has nebulizer and glucometer also but per spouse pt isnt compliant with using nebulizer and doesnt follow a diabetic diet. CM discussed therapy recommendation for inpatient rehab at LTAC level. he is in agreement to referral to [facility] in [place]. if d/c home prefes [facility] for DME needs. verified demographics, pt has elected meds to beds this admission. has rx coverage.. CM will need to f/u regarding d/c plan...Mackenzie Witt RN    Row Name 04/01/21 1045       Plan    Plan Comments  continued stay review- HHS- insulin  gtt stopped, SSI. CAP- hx of covid- duonebs, iv zosyn, . Creatinine 2.04 today- D5NS@100cc/hr..Mackenzie Witt RN        Continued Care and Services - Admitted Since 3/31/2021    Coordination has not been started for this encounter.     Selected Continued Care - Prior Encounters Includes selections from prior encounters from 12/31/2020 to 4/1/2021    Discharged on 1/16/2021 Admission date: 1/13/2021 - Discharge disposition: Home-Health Care [facility]    Home Medical Care     Service Provider Selected Services Address Phone Fax Patient Preferred    [facility] [address] [address] [phone] --                    Expected Discharge Date and Time     Expected Discharge Date Expected Discharge Time    Apr 5, 2021         Demographic Summary     Row Name 04/01/21 1225       General Information    Admission Type  inpatient    Arrived From  home    Required Notices Provided  Important Message from Medicare    Referral Source  high risk screening    Reason for Consult  discharge planning    Preferred Language  English     Used During This Interaction  no       Contact Information    Permission Granted to Share Info With          Functional Status     Row Name 04/01/21  1226       Functional Status    Usual Activity Tolerance  moderate       Functional Status, IADL    Medications  independent    Meal Preparation  assistive person    Housekeeping  assistive person    Laundry  assistive person    Shopping  assistive person    IADL Comments  requires assistant with adls       Mental Status Summary    Recent Changes in Mental Status/Cognitive Functioning  unable to assess    Mental Status Comments  pt lethargic       Employment/    Employment Status  disabled        Psychosocial    No documentation.       Abuse/Neglect    No documentation.       Legal    No documentation.       Substance Abuse    No documentation.       Patient Forms    No documentation.           Mackenzie Witt RN

## 2021-04-01 NOTE — PLAN OF CARE
Goal Outcome Evaluation:  Plan of Care Reviewed With: spouse  Progress: no change  Outcome Summary: PT eval with patient; She is lethargic but did awaken w/ intermittent stim to help w AAROM LEs and AROM within her limits for UEs; She is limited in range in supine by pannus but did intiate hip/knee flx once placed insupine but does not maintain activity without cuing and without breaks from requiest then revisit. She did answer question re: home but unsure if accurate re steps; Pt can benefit from skiled therapy to progress mobilty and activity level. She will likely need rehab course before attmempt return home as she is dependient for care and mobility currently. LTACH, rehab to home before d/c.

## 2021-04-01 NOTE — NURSING NOTE
Dr. Coffey paged and spoken with regarding pt insulin drip and her glucoses.  MD advises to continue drip.  Md notified of pt continued somnolence, abg ordered.

## 2021-04-01 NOTE — PLAN OF CARE
Goal Outcome Evaluation:  Plan of Care Reviewed With: caregiver, patient  Progress: no change  Outcome Summary: Pt currently with decreased mentation.  HgbA1c significantly elevated at 14 with blood glucose of 674 on admit.  RD will monitor her tx plans ant will hopefully provide eduation prior to d/c.

## 2021-04-01 NOTE — PROGRESS NOTES
"Pharmacokinetics by Pharmacy - Vancomycin    Yamileth Slater is a 61 y.o. female on vancomycin for pneumonia     Patient is also receiving zosyn    Objective:  [Ht: 157.5 cm (62\"); Wt: 125 kg (275 lb)]     WBC   Date Value Ref Range Status   04/01/2021 12.29 (H) 3.40 - 10.80 10*3/mm3 Final   03/31/2021 11.45 (H) 3.40 - 10.80 10*3/mm3 Final   03/31/2021 12.93 (H) 3.40 - 10.80 10*3/mm3 Final    No results found for: CRP, LACTATE   Temp Readings from Last 1 Encounters:   04/01/21 99.6 °F (37.6 °C) (Axillary)     Estimated Creatinine Clearance: 34.3 mL/min (A) (by C-G formula based on SCr of 2.15 mg/dL (H)).   Creatinine   Date Value Ref Range Status   04/01/2021 2.15 (H) 0.57 - 1.00 mg/dL Final   04/01/2021 2.04 (H) 0.57 - 1.00 mg/dL Final   04/01/2021 2.30 (H) 0.57 - 1.00 mg/dL Final       No results found for: VANCOPEAK, VANCOTROUGH, VANCORANDOM    Culture Results:  Microbiology Results (last 10 days)     Procedure Component Value - Date/Time    MRSA Screen, PCR (Inpatient) - Swab, Nares [[id number]]  (Abnormal) Collected: 04/01/21 1126    Lab Status: Final result Specimen: Swab from Nares Updated: 04/01/21 1307     MRSA, PCR Positive    Narrative:      Performed by real-time polymerase chain reaction (qPCR).    S. Pneumo Ag Urine or CSF - Urine, Urine, Clean Catch [[id number]]  (Normal) Collected: 04/01/21 0142    Lab Status: Final result Specimen: Urine, Clean Catch Updated: 04/01/21 0925     Strep Pneumo Ag Negative    Legionella Antigen, Urine - Urine, Urine, Clean Catch [[id number]]  (Normal) Collected: 04/01/21 0142    Lab Status: Final result Specimen: Urine, Clean Catch Updated: 04/01/21 0925     LEGIONELLA ANTIGEN, URINE Negative    COVID-19 and FLU A/B PCR - Swab, Nasopharynx [[id number]]  (Normal) Collected: 03/31/21 1819    Lab Status: Final result Specimen: Swab from Nasopharynx Updated: 03/31/21 1841     COVID19 Not Detected     Influenza A PCR Not Detected     Influenza B PCR Not Detected    " Narrative:      Fact sheet for providers: https://www.fda.gov/media/638520/download    Fact sheet for patients: https://www.fda.gov/media/795869/download    Test performed by PCR.    Respiratory Panel, PCR (WITHOUT COVID) - Swab, Nasopharynx [[id number]]  (Normal) Collected: 03/31/21 1819    Lab Status: Final result Specimen: Swab from Nasopharynx Updated: 04/01/21 1007     ADENOVIRUS, PCR Not Detected     Coronavirus 229E Not Detected     Coronavirus HKU1 Not Detected     Coronavirus NL63 Not Detected     Coronavirus OC43 Not Detected     Human Metapneumovirus Not Detected     Human Rhinovirus/Enterovirus Not Detected     Influenza B PCR Not Detected     Parainfluenza Virus 1 Not Detected     Parainfluenza Virus 2 Not Detected     Parainfluenza Virus 3 Not Detected     Parainfluenza Virus 4 Not Detected     Bordetella pertussis pcr Not Detected     Chlamydophila pneumoniae PCR Not Detected     Mycoplasma pneumo by PCR Not Detected     Influenza A PCR Not Detected     RSV, PCR Not Detected     Bordetella parapertussis PCR Not Detected    Narrative:      The coronavirus on the RVP is NOT COVID-19 and is NOT indicative of infection with COVID-19.         No results found for: RESPCX    Assessment:  WBC elevated, not yet trending down  MRSA PCR was positive however, is not indicative of MRSA PNA and we should adjust ABx based on respiratory culture  PCT slightly elevated  Scr significantly elevated. Unclear what baseline is    Vancomycin goal will be 400-600 for this indication  Patient received loading dose of 2g IV vancomycin once today  We will then start 1250 mg IV every 24 hours tomorrow with an anticipated AUC of 507 and trough of 14.6  Given unclear renal status with an elevated Scr we will check a random level before the next dose is given. I would then wait and ensure scr is stable before attempting to measure an AUC with levels     Give vancomycin 1250 mg IV every 24 hours starting tomorrow at 1100 if random  is less than 20    Plan:  1. Give vancomycin 1250mg IV Q24H  2. Random 4/2 1030  3. Pharmacy will monitor renal function and adjust dose accordingly.      Bladimir Hernandez, PharmD   04/01/21 14:36 CDT

## 2021-04-02 ENCOUNTER — APPOINTMENT (OUTPATIENT)
Dept: ULTRASOUND IMAGING | Facility: HOSPITAL | Age: 61
End: 2021-04-02

## 2021-04-02 ENCOUNTER — APPOINTMENT (OUTPATIENT)
Dept: INTERVENTIONAL RADIOLOGY/VASCULAR | Facility: HOSPITAL | Age: 61
End: 2021-04-02

## 2021-04-02 PROBLEM — A49.02 MRSA INFECTION: Status: ACTIVE | Noted: 2021-04-02

## 2021-04-02 LAB
ALBUMIN SERPL-MCNC: 2.4 G/DL (ref 3.5–5.2)
ALBUMIN/GLOB SERPL: 0.7 G/DL
ALP SERPL-CCNC: 147 U/L (ref 39–117)
ALT SERPL W P-5'-P-CCNC: 8 U/L (ref 1–33)
ANION GAP SERPL CALCULATED.3IONS-SCNC: 7 MMOL/L (ref 5–15)
ANION GAP SERPL CALCULATED.3IONS-SCNC: 8 MMOL/L (ref 5–15)
ANION GAP SERPL CALCULATED.3IONS-SCNC: 9 MMOL/L (ref 5–15)
AST SERPL-CCNC: 10 U/L (ref 1–32)
BACTERIA SPEC AEROBE CULT: NO GROWTH
BASOPHILS # BLD AUTO: 0.04 10*3/MM3 (ref 0–0.2)
BASOPHILS NFR BLD AUTO: 0.5 % (ref 0–1.5)
BILIRUB SERPL-MCNC: 0.2 MG/DL (ref 0–1.2)
BUN SERPL-MCNC: 34 MG/DL (ref 8–23)
BUN SERPL-MCNC: 35 MG/DL (ref 8–23)
BUN SERPL-MCNC: 37 MG/DL (ref 8–23)
BUN/CREAT SERPL: 15 (ref 7–25)
BUN/CREAT SERPL: 15 (ref 7–25)
BUN/CREAT SERPL: 15.8 (ref 7–25)
CALCIUM SPEC-SCNC: 8.3 MG/DL (ref 8.6–10.5)
CALCIUM SPEC-SCNC: 8.4 MG/DL (ref 8.6–10.5)
CALCIUM SPEC-SCNC: 8.6 MG/DL (ref 8.6–10.5)
CHLORIDE SERPL-SCNC: 109 MMOL/L (ref 98–107)
CHLORIDE SERPL-SCNC: 110 MMOL/L (ref 98–107)
CHLORIDE SERPL-SCNC: 111 MMOL/L (ref 98–107)
CO2 SERPL-SCNC: 18 MMOL/L (ref 22–29)
CO2 SERPL-SCNC: 19 MMOL/L (ref 22–29)
CO2 SERPL-SCNC: 20 MMOL/L (ref 22–29)
CREAT SERPL-MCNC: 2.22 MG/DL (ref 0.57–1)
CREAT SERPL-MCNC: 2.26 MG/DL (ref 0.57–1)
CREAT SERPL-MCNC: 2.46 MG/DL (ref 0.57–1)
DEPRECATED RDW RBC AUTO: 51.7 FL (ref 37–54)
EOSINOPHIL # BLD AUTO: 0.21 10*3/MM3 (ref 0–0.4)
EOSINOPHIL NFR BLD AUTO: 2.4 % (ref 0.3–6.2)
ERYTHROCYTE [DISTWIDTH] IN BLOOD BY AUTOMATED COUNT: 15.7 % (ref 12.3–15.4)
GFR SERPL CREATININE-BSD FRML MDRD: 20 ML/MIN/1.73
GFR SERPL CREATININE-BSD FRML MDRD: 22 ML/MIN/1.73
GFR SERPL CREATININE-BSD FRML MDRD: 22 ML/MIN/1.73
GLOBULIN UR ELPH-MCNC: 3.6 GM/DL
GLUCOSE BLDC GLUCOMTR-MCNC: 334 MG/DL (ref 70–130)
GLUCOSE BLDC GLUCOMTR-MCNC: 372 MG/DL (ref 70–130)
GLUCOSE BLDC GLUCOMTR-MCNC: 372 MG/DL (ref 70–130)
GLUCOSE BLDC GLUCOMTR-MCNC: 376 MG/DL (ref 70–130)
GLUCOSE SERPL-MCNC: 343 MG/DL (ref 65–99)
GLUCOSE SERPL-MCNC: 366 MG/DL (ref 65–99)
GLUCOSE SERPL-MCNC: 404 MG/DL (ref 65–99)
HCT VFR BLD AUTO: 28.3 % (ref 34–46.6)
HGB BLD-MCNC: 8.9 G/DL (ref 12–15.9)
HOLD SPECIMEN: NORMAL
IMM GRANULOCYTES # BLD AUTO: 0.05 10*3/MM3 (ref 0–0.05)
IMM GRANULOCYTES NFR BLD AUTO: 0.6 % (ref 0–0.5)
LYMPHOCYTES # BLD AUTO: 1.53 10*3/MM3 (ref 0.7–3.1)
LYMPHOCYTES NFR BLD AUTO: 17.4 % (ref 19.6–45.3)
MCH RBC QN AUTO: 28.3 PG (ref 26.6–33)
MCHC RBC AUTO-ENTMCNC: 31.4 G/DL (ref 31.5–35.7)
MCV RBC AUTO: 89.8 FL (ref 79–97)
MONOCYTES # BLD AUTO: 0.57 10*3/MM3 (ref 0.1–0.9)
MONOCYTES NFR BLD AUTO: 6.5 % (ref 5–12)
NEUTROPHILS NFR BLD AUTO: 6.39 10*3/MM3 (ref 1.7–7)
NEUTROPHILS NFR BLD AUTO: 72.6 % (ref 42.7–76)
NRBC BLD AUTO-RTO: 0 /100 WBC (ref 0–0.2)
PLATELET # BLD AUTO: 240 10*3/MM3 (ref 140–450)
PMV BLD AUTO: 9.3 FL (ref 6–12)
POTASSIUM SERPL-SCNC: 4.8 MMOL/L (ref 3.5–5.2)
POTASSIUM SERPL-SCNC: 4.9 MMOL/L (ref 3.5–5.2)
POTASSIUM SERPL-SCNC: 4.9 MMOL/L (ref 3.5–5.2)
PROT SERPL-MCNC: 6 G/DL (ref 6–8.5)
RBC # BLD AUTO: 3.15 10*6/MM3 (ref 3.77–5.28)
SODIUM SERPL-SCNC: 136 MMOL/L (ref 136–145)
SODIUM SERPL-SCNC: 137 MMOL/L (ref 136–145)
SODIUM SERPL-SCNC: 138 MMOL/L (ref 136–145)
VANCOMYCIN SERPL-MCNC: 15.8 MCG/ML (ref 5–40)
WBC # BLD AUTO: 8.79 10*3/MM3 (ref 3.4–10.8)
WHOLE BLOOD HOLD SPECIMEN: NORMAL

## 2021-04-02 PROCEDURE — 82962 GLUCOSE BLOOD TEST: CPT

## 2021-04-02 PROCEDURE — 85025 COMPLETE CBC W/AUTO DIFF WBC: CPT | Performed by: STUDENT IN AN ORGANIZED HEALTH CARE EDUCATION/TRAINING PROGRAM

## 2021-04-02 PROCEDURE — 94799 UNLISTED PULMONARY SVC/PX: CPT

## 2021-04-02 PROCEDURE — 25010000002 PIPERACILLIN SOD-TAZOBACTAM PER 1 G: Performed by: STUDENT IN AN ORGANIZED HEALTH CARE EDUCATION/TRAINING PROGRAM

## 2021-04-02 PROCEDURE — 63710000001 INSULIN ASPART PER 5 UNITS: Performed by: STUDENT IN AN ORGANIZED HEALTH CARE EDUCATION/TRAINING PROGRAM

## 2021-04-02 PROCEDURE — 25010000002 VANCOMYCIN 5 G RECONSTITUTED SOLUTION: Performed by: STUDENT IN AN ORGANIZED HEALTH CARE EDUCATION/TRAINING PROGRAM

## 2021-04-02 PROCEDURE — 99232 SBSQ HOSP IP/OBS MODERATE 35: CPT | Performed by: STUDENT IN AN ORGANIZED HEALTH CARE EDUCATION/TRAINING PROGRAM

## 2021-04-02 PROCEDURE — 63710000001 INSULIN DETEMIR PER 5 UNITS: Performed by: STUDENT IN AN ORGANIZED HEALTH CARE EDUCATION/TRAINING PROGRAM

## 2021-04-02 PROCEDURE — C1751 CATH, INF, PER/CENT/MIDLINE: HCPCS

## 2021-04-02 PROCEDURE — 36410 VNPNXR 3YR/> PHY/QHP DX/THER: CPT

## 2021-04-02 PROCEDURE — 97166 OT EVAL MOD COMPLEX 45 MIN: CPT

## 2021-04-02 PROCEDURE — 76937 US GUIDE VASCULAR ACCESS: CPT

## 2021-04-02 PROCEDURE — 94760 N-INVAS EAR/PLS OXIMETRY 1: CPT

## 2021-04-02 PROCEDURE — 25010000002 HEPARIN (PORCINE) PER 1000 UNITS: Performed by: STUDENT IN AN ORGANIZED HEALTH CARE EDUCATION/TRAINING PROGRAM

## 2021-04-02 PROCEDURE — 80202 ASSAY OF VANCOMYCIN: CPT | Performed by: STUDENT IN AN ORGANIZED HEALTH CARE EDUCATION/TRAINING PROGRAM

## 2021-04-02 PROCEDURE — 80053 COMPREHEN METABOLIC PANEL: CPT | Performed by: FAMILY MEDICINE

## 2021-04-02 PROCEDURE — 05HB33Z INSERTION OF INFUSION DEVICE INTO RIGHT BASILIC VEIN, PERCUTANEOUS APPROACH: ICD-10-PCS | Performed by: STUDENT IN AN ORGANIZED HEALTH CARE EDUCATION/TRAINING PROGRAM

## 2021-04-02 PROCEDURE — 97530 THERAPEUTIC ACTIVITIES: CPT

## 2021-04-02 RX ORDER — SODIUM CHLORIDE 9 MG/ML
125 INJECTION, SOLUTION INTRAVENOUS CONTINUOUS
Status: DISCONTINUED | OUTPATIENT
Start: 2021-04-02 | End: 2021-04-04

## 2021-04-02 RX ORDER — SODIUM CHLORIDE 0.9 % (FLUSH) 0.9 %
10 SYRINGE (ML) INJECTION AS NEEDED
Status: DISCONTINUED | OUTPATIENT
Start: 2021-04-02 | End: 2021-04-12 | Stop reason: HOSPADM

## 2021-04-02 RX ORDER — DEXTROSE MONOHYDRATE 25 G/50ML
25 INJECTION, SOLUTION INTRAVENOUS
Status: DISCONTINUED | OUTPATIENT
Start: 2021-04-02 | End: 2021-04-02 | Stop reason: SDUPTHER

## 2021-04-02 RX ORDER — NICOTINE POLACRILEX 4 MG
15 LOZENGE BUCCAL
Status: DISCONTINUED | OUTPATIENT
Start: 2021-04-02 | End: 2021-04-02 | Stop reason: SDUPTHER

## 2021-04-02 RX ORDER — SODIUM CHLORIDE 0.9 % (FLUSH) 0.9 %
10 SYRINGE (ML) INJECTION EVERY 12 HOURS SCHEDULED
Status: DISCONTINUED | OUTPATIENT
Start: 2021-04-02 | End: 2021-04-12 | Stop reason: HOSPADM

## 2021-04-02 RX ADMIN — ACETAMINOPHEN 650 MG: 325 TABLET, FILM COATED ORAL at 15:23

## 2021-04-02 RX ADMIN — SODIUM CHLORIDE 125 ML/HR: 900 INJECTION, SOLUTION INTRAVENOUS at 10:43

## 2021-04-02 RX ADMIN — GABAPENTIN 100 MG: 100 CAPSULE ORAL at 14:15

## 2021-04-02 RX ADMIN — SODIUM CHLORIDE, PRESERVATIVE FREE 10 ML: 5 INJECTION INTRAVENOUS at 20:37

## 2021-04-02 RX ADMIN — SODIUM BICARBONATE TAB 325 MG 325 MG: 325 TAB at 08:26

## 2021-04-02 RX ADMIN — ACETAMINOPHEN 650 MG: 325 TABLET, FILM COATED ORAL at 09:32

## 2021-04-02 RX ADMIN — IPRATROPIUM BROMIDE AND ALBUTEROL SULFATE 3 ML: 2.5; .5 SOLUTION RESPIRATORY (INHALATION) at 20:21

## 2021-04-02 RX ADMIN — PANTOPRAZOLE SODIUM 40 MG: 40 TABLET, DELAYED RELEASE ORAL at 20:38

## 2021-04-02 RX ADMIN — MONTELUKAST SODIUM 10 MG: 10 TABLET, COATED ORAL at 20:39

## 2021-04-02 RX ADMIN — METOPROLOL TARTRATE 100 MG: 50 TABLET, FILM COATED ORAL at 20:39

## 2021-04-02 RX ADMIN — IPRATROPIUM BROMIDE AND ALBUTEROL SULFATE 3 ML: 2.5; .5 SOLUTION RESPIRATORY (INHALATION) at 10:59

## 2021-04-02 RX ADMIN — SODIUM CHLORIDE, PRESERVATIVE FREE 10 ML: 5 INJECTION INTRAVENOUS at 08:32

## 2021-04-02 RX ADMIN — ATORVASTATIN CALCIUM 40 MG: 40 TABLET, FILM COATED ORAL at 08:26

## 2021-04-02 RX ADMIN — SODIUM BICARBONATE TAB 325 MG 325 MG: 325 TAB at 20:37

## 2021-04-02 RX ADMIN — CYCLOBENZAPRINE 10 MG: 10 TABLET, FILM COATED ORAL at 09:32

## 2021-04-02 RX ADMIN — IPRATROPIUM BROMIDE AND ALBUTEROL SULFATE 3 ML: 2.5; .5 SOLUTION RESPIRATORY (INHALATION) at 06:42

## 2021-04-02 RX ADMIN — NYSTATIN 1 APPLICATION: 100000 POWDER TOPICAL at 17:32

## 2021-04-02 RX ADMIN — NYSTATIN: 100000 POWDER TOPICAL at 20:39

## 2021-04-02 RX ADMIN — INSULIN ASPART 8 UNITS: 100 INJECTION, SOLUTION INTRAVENOUS; SUBCUTANEOUS at 17:36

## 2021-04-02 RX ADMIN — ISOSORBIDE MONONITRATE 30 MG: 30 TABLET, EXTENDED RELEASE ORAL at 08:26

## 2021-04-02 RX ADMIN — CLOPIDOGREL BISULFATE 75 MG: 75 TABLET ORAL at 08:26

## 2021-04-02 RX ADMIN — PIPERACILLIN SODIUM AND TAZOBACTAM SODIUM 3.38 G: 3; .375 INJECTION, POWDER, LYOPHILIZED, FOR SOLUTION INTRAVENOUS at 00:46

## 2021-04-02 RX ADMIN — SODIUM CHLORIDE 125 ML/HR: 900 INJECTION, SOLUTION INTRAVENOUS at 21:50

## 2021-04-02 RX ADMIN — INSULIN DETEMIR 20 UNITS: 100 INJECTION, SOLUTION SUBCUTANEOUS at 20:36

## 2021-04-02 RX ADMIN — SODIUM CHLORIDE 125 ML/HR: 900 INJECTION, SOLUTION INTRAVENOUS at 01:53

## 2021-04-02 RX ADMIN — FERROUS SULFATE TAB EC 324 MG (65 MG FE EQUIVALENT) 324 MG: 324 (65 FE) TABLET DELAYED RESPONSE at 08:26

## 2021-04-02 RX ADMIN — HEPARIN SODIUM 5000 UNITS: 5000 INJECTION INTRAVENOUS; SUBCUTANEOUS at 21:50

## 2021-04-02 RX ADMIN — PIPERACILLIN SODIUM AND TAZOBACTAM SODIUM 3.38 G: 3; .375 INJECTION, POWDER, LYOPHILIZED, FOR SOLUTION INTRAVENOUS at 08:24

## 2021-04-02 RX ADMIN — GABAPENTIN 100 MG: 100 CAPSULE ORAL at 21:49

## 2021-04-02 RX ADMIN — INSULIN ASPART 7 UNITS: 100 INJECTION, SOLUTION INTRAVENOUS; SUBCUTANEOUS at 06:32

## 2021-04-02 RX ADMIN — GABAPENTIN 100 MG: 100 CAPSULE ORAL at 05:18

## 2021-04-02 RX ADMIN — VANCOMYCIN HYDROCHLORIDE 1250 MG: 5 INJECTION, POWDER, LYOPHILIZED, FOR SOLUTION INTRAVENOUS at 12:31

## 2021-04-02 RX ADMIN — HEPARIN SODIUM 5000 UNITS: 5000 INJECTION INTRAVENOUS; SUBCUTANEOUS at 05:17

## 2021-04-02 RX ADMIN — POTASSIUM CHLORIDE 10 MEQ: 750 CAPSULE, EXTENDED RELEASE ORAL at 08:27

## 2021-04-02 RX ADMIN — HEPARIN SODIUM 5000 UNITS: 5000 INJECTION INTRAVENOUS; SUBCUTANEOUS at 14:15

## 2021-04-02 RX ADMIN — INSULIN ASPART 8 UNITS: 100 INJECTION, SOLUTION INTRAVENOUS; SUBCUTANEOUS at 11:54

## 2021-04-02 RX ADMIN — NYSTATIN 1 APPLICATION: 100000 POWDER TOPICAL at 08:32

## 2021-04-02 RX ADMIN — OXYBUTYNIN CHLORIDE 5 MG: 5 TABLET, EXTENDED RELEASE ORAL at 08:26

## 2021-04-02 RX ADMIN — INSULIN DETEMIR 15 UNITS: 100 INJECTION, SOLUTION SUBCUTANEOUS at 08:33

## 2021-04-02 RX ADMIN — SODIUM CHLORIDE, PRESERVATIVE FREE 10 ML: 5 INJECTION INTRAVENOUS at 20:40

## 2021-04-02 RX ADMIN — CYCLOBENZAPRINE 10 MG: 10 TABLET, FILM COATED ORAL at 20:38

## 2021-04-02 RX ADMIN — PIPERACILLIN SODIUM AND TAZOBACTAM SODIUM 3.38 G: 3; .375 INJECTION, POWDER, LYOPHILIZED, FOR SOLUTION INTRAVENOUS at 17:32

## 2021-04-02 NOTE — PLAN OF CARE
Goal Outcome Evaluation:  Plan of Care Reviewed With: patient  Progress: improving  V/S stable, no fever, good urine output, 2 BMs today, Levemir started today, Flexeril prn given for back, Tylenol prn given for headache

## 2021-04-02 NOTE — PROGRESS NOTES
TWO PATIENT IDENTIFIERS WERE USED. THE PATIENT WAS DRAPED WITH A FULL BODY DRAPE AND THE PATIENT'S RIGHT ARM WAS PREPPED WITH CHLORA PREP. ULTRASOUND WAS USED TO LOCALIZE THERIGHT BASILIC VEIN. SUBCUTANEOUS TISSUE AT THE CATHETER SITE WAS INFILTRATED WITH 2% LIDOCAINE. UNDER ULTRASOUND GUIDANCE, THE VEIN WAS ACCESSED WITH A 21 GAUGE  NEEDLE. AN 0.018 WIRE WAS THEN THREADED THROUGH THE NEEDLE. THE 21 GAUGE NEEDLE WAS REMOVED AND A 4 Serbian SHEATH WAS PLACED OVER THE WIRE INTO THE VEIN.THE MIDLINE CATHETER WAS TRIMMED TO 20CM. THE MIDLINE CATHETER WAS THEN PLACED OVER THE WIRE INTO THE VEIN, THE SHEATH WAS PEELED AWAY, WIRE WAS REMOVED. CATHETER WAS FLUSHED WITH NORMAL SALINE AND CATHETER TIP APPLIED. BIOPATCH PLACED. CATHETER SECURED WITH STAT LOCK AND TEGADERM. PATIENT TOLERATED PROCEDURE WELL. THIS WAS DONE IN THE ANGIOSUITE      IMPRESSION:SUCCESSFUL PLACEMENT OF DUAL LUMEN MIDLINE.           Dalila Lopez  4/2/2021  15:12 CDT

## 2021-04-02 NOTE — PLAN OF CARE
Problem: Adult Inpatient Plan of Care  Goal: Plan of Care Review  Outcome: Ongoing, Progressing   Goal Outcome Evaluation:   Pt rested well throughout night. VS stable. Forman with adequate output. BS was 404 during the night, MD notified and orders given. No complaints at this time, will continue to monitor

## 2021-04-02 NOTE — CONSULTS
Adult Nutrition  Assessment    Patient Name:  Yamileth Slater  YOB: 1960  MRN: [id number]  Admit Date:  3/31/2021    Assessment Date:  4/2/2021    Comments: Pt is alert and talking.  Mentation much improved.  She reports eating well today.  Still being managed for hyperglycemia with elevated glucose and pneumonia, SSI and abx prescribed.  Pt reports that she rarely eats 3 meals a day. She checks her blood sugar once a day. RD Provided diet education on Diabetes, Carbohydrate counting, SMBS, and wt loss for good health.  Diet copies and contact number provided.  Will continue to follow.      Reason for Assessment     Row Name 04/02/21 1502          Reason for Assessment    Reason For Assessment  follow-up protocol         Nutrition/Diet History     Row Name 04/02/21 1502          Nutrition/Diet History    Typical Food/Fluid Intake  Pt alert and talking.  ATe well today per staff.  Pt reports that she typically does not eat 3 meals a day.  She does check her blood sugars once a day but not more than that.  She said that at one time her HgbA1c was down to 7 but then the Nephrologist took her off some of the DM education and her blood sugars started going up           Labs/Tests/Procedures/Meds     Row Name 04/02/21 1505          Labs/Procedures/Meds    Lab Results Comments  Gluc 313/334/343; Bun 34; Creat 2.26; Alb 2.40;        Medications    Pertinent Medications Comments  VAnc; Ferrous sulfate; SSI; Levemir; Na Cl 125cc/hr         Physical Findings     Row Name 04/02/21 1508          Physical Findings    Overall Physical Appearance  on oxygen therapy;obese           Nutrition Prescription Ordered     Row Name 04/02/21 1508          Nutrition Prescription PO    Current PO Diet  Regular     Fluid Consistency  Thin     Common Modifiers  Cardiac;Consistent Carbohydrate                 Electronically signed by:  Gissel Littlejohn RD  04/02/21 15:17 CDT

## 2021-04-02 NOTE — PLAN OF CARE
Problem: Noninvasive Ventilation Acute  Goal: Effective Unassisted Ventilation and Oxygenation  Outcome: Ongoing, Progressing   Goal Outcome Evaluation:        Took mask off pt approx 2000. Placed on 3L nasal cannula.

## 2021-04-02 NOTE — PROGRESS NOTES
"  Pharmacokinetics by Pharmacy - Vancomycin    Yamileth Slater is a 61 y.o. female  [Ht: 157.5 cm (62\"); Wt: 125 kg (275 lb)]    Estimated Creatinine Clearance: 32.6 mL/min (A) (by C-G formula based on SCr of 2.26 mg/dL (H)).   Creatinine   Date Value Ref Range Status   04/02/2021 2.26 (H) 0.57 - 1.00 mg/dL Final   04/02/2021 2.46 (H) 0.57 - 1.00 mg/dL Final   04/02/2021 2.22 (H) 0.57 - 1.00 mg/dL Final      Lab Results   Component Value Date    WBC 8.79 04/02/2021    WBC 12.29 (H) 04/01/2021    WBC 11.45 (H) 03/31/2021     C-Reactive Protein   Date Value Ref Range Status   01/14/2021 12.40 (H) 0.00 - 0.50 mg/dL Final   04/17/2017 5.40 (H) 0.00 - 1.00 mg/dL Final   04/16/2017 4.90 (H) 0.00 - 1.00 mg/dL Final     Lactate   Date Value Ref Range Status   09/10/2018 1.3 0.5 - 2.0 mmol/L Final   09/10/2018 2.1 (C) 0.5 - 2.0 mmol/L Final   04/18/2017 1.4 0.5 - 2.0 mmol/L Final       Temp Readings from Last 1 Encounters:   04/02/21 97.8 °F (36.6 °C) (Axillary)      Lab Results   Component Value Date    VANCOTROUGH 13.25 03/20/2017    VANCORANDOM 15.80 04/02/2021         Culture Results:  Microbiology Results (last 10 days)       Procedure Component Value - Date/Time    MRSA Screen, PCR (Inpatient) - Swab, Nares [[id number]]  (Abnormal) Collected: 04/01/21 1126    Lab Status: Final result Specimen: Swab from Nares Updated: 04/01/21 1307     MRSA, PCR Positive    Narrative:      Performed by real-time polymerase chain reaction (qPCR).    Urine Culture - Urine, Urine, Clean Catch [[id number]]  (Normal) Collected: 04/01/21 0142    Lab Status: Final result Specimen: Urine, Clean Catch Updated: 04/02/21 1017     Urine Culture No growth    S. Pneumo Ag Urine or CSF - Urine, Urine, Clean Catch [[id number]]  (Normal) Collected: 04/01/21 0142    Lab Status: Final result Specimen: Urine, Clean Catch Updated: 04/01/21 0925     Strep Pneumo Ag Negative    Legionella Antigen, Urine - Urine, Urine, Clean Catch [[id number]]  (Normal) " Collected: 04/01/21 0142    Lab Status: Final result Specimen: Urine, Clean Catch Updated: 04/01/21 0925     LEGIONELLA ANTIGEN, URINE Negative    COVID-19 and FLU A/B PCR - Swab, Nasopharynx [[id number]]  (Normal) Collected: 03/31/21 1819    Lab Status: Final result Specimen: Swab from Nasopharynx Updated: 03/31/21 1841     COVID19 Not Detected     Influenza A PCR Not Detected     Influenza B PCR Not Detected    Narrative:      Fact sheet for providers: https://www.fda.gov/media/090472/download    Fact sheet for patients: https://www.fda.gov/media/917043/download    Test performed by PCR.    Respiratory Panel, PCR (WITHOUT COVID) - Swab, Nasopharynx [[id number]]  (Normal) Collected: 03/31/21 1819    Lab Status: Final result Specimen: Swab from Nasopharynx Updated: 04/01/21 1007     ADENOVIRUS, PCR Not Detected     Coronavirus 229E Not Detected     Coronavirus HKU1 Not Detected     Coronavirus NL63 Not Detected     Coronavirus OC43 Not Detected     Human Metapneumovirus Not Detected     Human Rhinovirus/Enterovirus Not Detected     Influenza B PCR Not Detected     Parainfluenza Virus 1 Not Detected     Parainfluenza Virus 2 Not Detected     Parainfluenza Virus 3 Not Detected     Parainfluenza Virus 4 Not Detected     Bordetella pertussis pcr Not Detected     Chlamydophila pneumoniae PCR Not Detected     Mycoplasma pneumo by PCR Not Detected     Influenza A PCR Not Detected     RSV, PCR Not Detected     Bordetella parapertussis PCR Not Detected    Narrative:      The coronavirus on the RVP is NOT COVID-19 and is NOT indicative of infection with COVID-19.           No results found for: RESPCX    Indication for use: pneumonia    Current Vancomycin Dose:  1250 mg IVPB every 24 hours, day 2 of therapy.     Pt is also receiving Zosyn    Assessment/Plan:  Reviewed above labs and cultures.   MRSA PCR was positive however, is not indicative of MRSA PNA and we should adjust ABx based on respiratory culture.    Vancomycin   random level from 4/2  at 0835 is 15.8. WBC is 8.79, back in goal range. Cr is 2.26.   Will continue current dose.  Pharmacy will continue to monitor renal function and adjust dose accordingly.    Светлана Lal, PharmD   04/02/21 10:33 CDT

## 2021-04-02 NOTE — SIGNIFICANT NOTE
Was informed by lab and CCU nurse that patient's BG is in the 400's. Currently on SSI and D5 half NS. Will discontinue D5 half NS and switch to Normal saline infusion. Continue SSI as scheduled with meals.              This document has been electronically signed by Carline Coffey MD on April 2, 2021 01:30 CDT

## 2021-04-02 NOTE — PLAN OF CARE
Goal Outcome Evaluation:  Plan of Care Reviewed With: patient, spouse     Outcome Summary: OT cherie completed this date. Pt alert and cooperative.  present part of session. Pt dependent with toileting, Pt dependent of 2 for scooting up in the bed. pt mod to max assist of 1-2 to roll in bed. Pt set up and supervision with self feeding min difficulty safely locating items on tray. Pt has a recent wrist fx and reported she took off her cast herself a week ago. For now until further MD clarification OT will treat right wrist with non weight bear and no ROM. Pt could benefit from further skilled OT to reach PLOF/max independence with ADL. Pt may benefit from rehab before d/c home with 24/7 care and further therapy.

## 2021-04-02 NOTE — THERAPY TREATMENT NOTE
Acute Care - Physical Therapy Treatment Note  [facility]     Patient Name: Yamileth Slater  : 1960  MRN: [id number]  Today's Date: 2021           PT Assessment (last 12 hours)      PT Evaluation and Treatment     Row Name 21 151          Physical Therapy Time and Intention    Subjective Information  complains of;weakness;pain  -LN     Document Type  evaluation  -LN     Mode of Treatment  individual therapy;physical therapy  -LN     Comment  (S)  nsg ok for eob-nsg states md to call  pt's md regarding cast off her right arm-nsg ok for eob but pt was not allowed in wt bearing/pulling with right ue  -LN     Row Name 21 151          General Information    Patient Profile Reviewed  yes  -LN     Existing Precautions/Restrictions  fall;oxygen therapy device and L/min  -LN     Row Name 21 151          Cognition    Orientation Status (Cognition)  oriented to;person;place   -LN     Row Name 21 151          Pain Scale: Numbers Pre/Post-Treatment    Pretreatment Pain Rating  8/10  -LN     Posttreatment Pain Rating  8/10  -LN     Pain Location  head  -LN     Pre/Posttreatment Pain Comment  meds given  -LN     Row Name 21 151          Bed Mobility    Scooting/Bridging [place] (Bed Mobility)  minimum assist (75% patient effort) pt able to scoot up in bed with min of 1  -LN     Supine-Sit [place] (Bed Mobility)  moderate assist (50% patient effort);2 person assist  -LN     Sit-Supine [place] (Bed Mobility)  moderate assist (50% patient effort);1 person assist  -LN     Row Name 21 151          Transfers    Sit-Stand [place] (Transfers)  not tested  -LN     Stand-Sit [place] (Transfers)  not tested  -LN     Row Name 21 151          Knee (Therapeutic Exercise)    Knee (Therapeutic Exercise)  AROM (active range of motion)  -LN     Knee AROM (Therapeutic Exercise)  bilateral;LAQ (long arc quad)  -LN     Row Name 21 151           Ankle (Therapeutic Exercise)    Ankle (Therapeutic Exercise)  AROM (active range of motion)  -LN     Ankle AROM (Therapeutic Exercise)  dorsiflexion;bilateral;plantarflexion  -LN     Row Name 04/02/21 1510          Plan of Care Review    Plan of Care Reviewed With  patient  -LN     Outcome Summary  sup-sit mod of 2,sit-sup mod of 1,sat eob x 25' with sba of 1;10 reps seated ex  -LN     Row Name 04/02/21 1510          Vital Signs    Pre Systolic BP Rehab  130  -LN     Pre Treatment Diastolic BP  61  -LN     Intra Systolic BP Rehab  139  -LN     Intra Treatment Diastolic BP  63  -LN     Post Systolic BP Rehab  136  -LN     Post Treatment Diastolic BP  63  -LN     Pretreatment Heart Rate (beats/min)  84  -LN     Intratreatment Heart Rate (beats/min)  92  -LN     Posttreatment Heart Rate (beats/min)  86  -LN     Pre Patient Position  Supine  -LN     Intra Patient Position  Sitting  -LN     Post Patient Position  Supine  -LN     Row Name 04/02/21 1510          Positioning and Restraints    Post Treatment Position  bed  -LN     In Bed  notified nsg;supine;call light within reach;encouraged to call for assist  -LN     Row Name 04/02/21 1510          Therapy Assessment/Plan (PT)    Rehab Potential (PT)  fair, will monitor progress closely  -LN     Criteria for Skilled Interventions Met (PT)  yes  -LN       User Key  (r) = Recorded By, (t) = Taken By, (c) = Cosigned By    Initials Name Provider Type    LN Lor Tavarez PTA Physical Therapy Assistant        Physical Therapy Education                 Title: PT OT SLP Therapies (In Progress)     Topic: Physical Therapy (In Progress)     Point: Mobility training (In Progress)     Learning Progress Summary           Patient Acceptance, D,E, NR by JUANCARLOS at 4/1/2021 1056    Comment: POC: instructions for eval/ex to encourage active  mobility                   Point: Home exercise program (Not Started)     Learner Progress:  Not documented in this visit.          Point: Body mechanics  (Not Started)     Learner Progress:  Not documented in this visit.          Point: Precautions (Not Started)     Learner Progress:  Not documented in this visit.                      User Key     Initials Effective Dates Name Provider Type Discipline     04/03/18 -  Gauri Anderson PT Physical Therapist PT              PT Recommendation and Plan  Therapy Frequency (PT):  (5-7 d/w)  Plan of Care Reviewed With: patient  Outcome Summary: sup-sit mod of 2,sit-sup mod of 1,sat eob x 25' with sba of 1;10 reps seated ex       Time Calculation:   PT Charges     Row Name 04/02/21 1609             Time Calculation    Start Time  1510  -LN      Stop Time  1553  -LN      Time Calculation (min)  43 min  -LN      PT Received On  04/02/21  -LN         Time Calculation- PT    Total Timed Code Minutes- PT  43 minute(s)  -LN        User Key  (r) = Recorded By, (t) = Taken By, (c) = Cosigned By    Initials Name Provider Type    LN Lor Tavarez PTA Physical Therapy Assistant        Therapy Charges for Today     Code Description Service Date Service Provider Modifiers Qty    05401910973 HC PT THERAPEUTIC ACT EA 15 MIN 4/2/2021 Lor Tavarez PTA GP 3          PT G-Codes  Outcome Measure Options: AM-PAC 6 Clicks Daily Activity (OT)  AM-PAC 6 Clicks Score (OT): 13    Lor Taavrez PTA  4/2/2021

## 2021-04-02 NOTE — PLAN OF CARE
Goal Outcome Evaluation:  Plan of Care Reviewed With: patient     Outcome Summary: sup-sit mod of 2,sit-sup mod of 1,sat eob x 25' with sba of 1;10 reps seated ex

## 2021-04-02 NOTE — PLAN OF CARE
Goal Outcome Evaluation:  Plan of Care Reviewed With: caregiver, patient  Progress: improving  Outcome Summary: Mentation much improved.  Pt taking po.  Glucose still elevated.  Education on Diabetes provided along with healthy eating for wt loss.

## 2021-04-02 NOTE — THERAPY EVALUATION
Patient Name: Yamileth Slater  : 1960    MRN: [id number]                              Today's Date: 2021       Admit Date: 3/31/2021    Visit Dx:     ICD-10-CM ICD-9-CM   1. Hyperglycemia  R73.9 790.29   2. Urinary tract infection in female  N39.0 599.0   3. Impaired mobility and ADLs  Z74.09 V49.89    Z78.9    4. Impaired functional mobility, balance, gait, and endurance  Z74.09 V49.89     Patient Active Problem List   Diagnosis   • Type 2 diabetes mellitus with diabetic polyneuropathy, with long-term current use of insulin (CMS/[facility])   • Chronic obstructive pulmonary disease (CMS/[facility])   • Low back pain   • Vitamin D deficiency   • Type 2 diabetes mellitus (CMS/[facility])   • Tobacco dependence syndrome   • Surgical follow-up care   • Shoulder joint pain   • Peripheral vascular disease (CMS/[facility])   • Pain   • Neurologic disorder associated with diabetes mellitus (CMS/[facility])   • Morbid obesity with BMI of 50.0-59.9, adult (CMS/[facility])   • Mixed hyperlipidemia   • Kidney stone   • History of colon polyps   • GERD (gastroesophageal reflux disease)   • Excoriated eczema   • Essential hypertension   • Encounter for medication refill   • Dyslipidemia   • Diabetes mellitus (CMS/[facility])   • Coronary artery disease   • Chronic obstructive lung disease (CMS/[facility])   • Chronic folliculitis   • Chest pain   • Mild persistent asthma   • Anxiety states   • 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   • Uncontrolled type 2 diabetes mellitus with complication, with long-term current use of insulin (CMS/[facility])   • Acute renal failure superimposed on chronic kidney disease (CMS/[facility])   • Anemia   • Hypothyroidism   • Carotid artery disease (CMS/[facility])   • Current smoker   • DM type 2 with diabetic peripheral neuropathy (CMS/[facility])   • [place] abuse   • PAD (peripheral artery disease) (CMS/[facility])   • Pneumonia of both lungs due to infectious organism   • S/P CABG x 3   • Intercostal pain   • Venous insufficiency   • Dyspnea on  exertion   • LAFB (left anterior fascicular block)   • Pulmonary hypertension (CMS/[facility])   • Left hip pain   • Neck pain   • Stage 3b chronic kidney disease (CMS/[facility])   • MIKE and COPD overlap syndrome (CMS/[facility])   • Pneumonia due to COVID-19 virus   • CKD (chronic kidney disease) stage 4, GFR 15-29 ml/min (CMS/[facility])   • Morbid obesity (CMS/[facility])   • Type 2 diabetes mellitus with hyperosmolar hyperglycemic state (HHS) (CMS/[facility])   • Metabolic acidosis   • Hyponatremia   • Hyperkalemia   • Hypocalcemia   • Anemia   • Acute cystitis   • Cutaneous candidiasis   • Community acquired pneumonia of right middle lobe of lung   • MRSA infection     Past Medical History:   Diagnosis Date   • Anxiety    • Carotid artery stenosis    • Chronic obstructive lung disease (CMS/[facility])    • CKD (chronic kidney disease) stage 4, GFR 15-29 ml/min (CMS/[facility])    • Colonic polyp    • Coronary arteriosclerosis    • Diabetes mellitus (CMS/[facility])    • Diabetic neuropathy (CMS/[facility])    • GERD (gastroesophageal reflux disease)    • Hypercholesterolemia    • Hypertension    • Morbid obesity (CMS/[facility])    • Nephrolithiasis    • Peripheral vascular disease (CMS/[facility])    • Sleep apnea    • Substance abuse (CMS/[facility])    • Vitamin D deficiency      Past Surgical History:   Procedure Laterality Date   • CARDIAC CATHETERIZATION N/A 7/14/2020   • CAROTID STENT Left    • COLONOSCOPY     • CORONARY ARTERY BYPASS GRAFT     • CYSTOSCOPY BLADDER STONE LITHOTRIPSY Bilateral      General Information     Row Name 04/02/21 1138          OT Time and Intention    Document Type  evaluation  -     Mode of Treatment  individual therapy;occupational therapy  -     Row Name 04/02/21 1138          General Information    Patient Profile Reviewed  yes  -     Prior Level of Function  independent:;all household mobility;transfer;ADL's;bed mobility  does IADL and driving  -     Existing Precautions/Restrictions  fall;oxygen therapy device and L/min;non-weight bearing right  wrist fx, pt reports she took off her cast a week ago, RN stated would discuss with MD; for now OT will be non weight bear wrist and no ROM  -     Barriers to Rehab  medically complex;previous functional deficit  -     Row Name 04/02/21 1138          Living Environment    Lives With  spouse  -     Row Name 04/02/21 1138          Home Main Entrance    Number of Stairs, Main Entrance  none  -     Row Name 04/02/21 1138          Stairs Within Home, Primary    Stairs Comment, Within Home, Primary  tub shower shower seat HH shower head; tall toilet grab bar on the tub; has a SW, rollator, on O2 as needed; SC  -     Row Name 04/02/21 1138          Cognition    Orientation Status (Cognition)  oriented x 4  -     Row Name 04/02/21 1138          Safety Issues, Functional Mobility    Safety Issues Affecting Function (Mobility)  insight into deficits/self-awareness;safety precautions follow-through/compliance;safety precaution awareness;sequencing abilities;impulsivity;at risk behavior observed;judgment  -     Impairments Affecting Function (Mobility)  balance;coordination;postural/trunk control;range of motion (ROM);endurance/activity tolerance;motor control;motor planning;strength;shortness of breath;pain  -       User Key  (r) = Recorded By, (t) = Taken By, (c) = Cosigned By    Initials Name Provider Type     Mary Jo Michel, OTR/L Occupational Therapist          Mobility/ADL's     Row Name 04/02/21 1138          Bed Mobility    Bed Mobility  rolling right;scooting/bridging;rolling left  -     Rolling Left [place] (Bed Mobility)  moderate assist (50% patient effort);maximum assist (25% patient effort);2 person assist  -     Rolling Right [place] (Bed Mobility)  moderate assist (50% patient effort);maximum assist (25% patient effort);2 person assist  -     Scooting/Bridging [place] (Bed Mobility)  dependent (less than 25% patient effort);2 person assist  -     Bed Mobility, Safety  Issues  decreased use of arms for pushing/pulling;decreased use of legs for bridging/pushing;impaired trunk control for bed mobility  -     Assistive Device (Bed Mobility)  draw sheet;head of bed elevated;bed rails  -BH     Row Name 04/02/21 1138          Transfers    Comment (Transfers)  defer this date, pt struggled with bed mobility and then lunch arrived  -[facility] Name 04/02/21 1138          Activities of Daily Living    BADL Assessment/Intervention  toileting;feeding;grooming  -BH     Row Name 04/02/21 1138          Mobility    Extremity Weight-bearing Status  right upper extremity  -     Right Upper Extremity (Weight-bearing Status)  non weight-bearing (NWB) no ROM non weight bear wrist; pt reports she took her cast off her arm RN to discuss with MD  -BH     Row Name 04/02/21 1138          Toileting Assessment/Training    [place] Level (Toileting)  toileting skills;dependent (less than 25% patient effort)  -BH     Row Name 04/02/21 1138          Self-Feeding Assessment/Training    [place] Level (Feeding)  feeding skills;set up  -BH     Row Name 04/02/21 1138          Grooming Assessment/Training    Comment (Grooming)  pt setup assist with wipe to wash hands  -       User Key  (r) = Recorded By, (t) = Taken By, (c) = Cosigned By    Initials Name Provider Type     Mary Jo Michel, OTR/L Occupational Therapist        Obj/Interventions     [facility] Name 04/02/21 1139          Sensory Interventions    Comment, Sensory Intervention  BUE light touch [facility]  -[facility] Name 04/02/21 1139          Vision Assessment/Intervention    Visual Impairment/Limitations  corrective lenses for reading hearing [facility]  -[facility] Name 04/02/21 1139          Range of Motion Comprehensive    Comment, General Range of Motion  LUE WFL AROM; RUE elbow and shoulder AROM [facility] did not formally assess wrist and digits due to recent fx and last ortho note mentioned supposed to be casted. however, pt was moving it on her own  -      Row Name 04/02/21 1139          Strength Comprehensive (MMT)    Comment, General Manual Muscle Testing (MMT) Assessment  LUE  and UE grossly 4/5; RUE NT due to wrist fx elbow and shoulder at least 3/5  -       User Key  (r) = Recorded By, (t) = Taken By, (c) = Cosigned By    Initials Name Provider Type     Mary Jo Michel, OTR/L Occupational Therapist        Goals/Plan     Row Name 04/02/21 1138          Transfer Goal 1 (OT)    Activity/Assistive Device (Transfer Goal 1, OT)  toilet;commode, bedside with drop arms;commode, bedside without drop arms  -     [place] Level/Cues Needed (Transfer Goal 1, OT)  minimum assist (75% or more patient effort)  -     Time Frame (Transfer Goal 1, OT)  long term goal (LTG);by discharge  -     Progress/Outcome (Transfer Goal 1, OT)  goal not met  -BH     Row Name 04/02/21 1138          Bathing Goal 1 (OT)    Activity/Device (Bathing Goal 1, OT)  bathing skills, all  -     [place] Level/Cues Needed (Bathing Goal 1, OT)  set-up required;standby assist;verbal cues required  -     Time Frame (Bathing Goal 1, OT)  long term goal (LTG);by discharge  -     Progress/Outcomes (Bathing Goal 1, OT)  goal not met  -BH     Row Name 04/02/21 1138          Dressing Goal 1 (OT)    Activity/Device (Dressing Goal 1, OT)  dressing skills, all  -     [place]/Cues Needed (Dressing Goal 1, OT)  set-up required;standby assist;verbal cues required  -     Time Frame (Dressing Goal 1, OT)  long term goal (LTG);by discharge  -     Progress/Outcome (Dressing Goal 1, OT)  goal not met  -BH     Row Name 04/02/21 1138          Therapy Assessment/Plan (OT)    Planned Therapy Interventions (OT)  activity tolerance training;adaptive equipment training;BADL retraining;edema control/reduction;functional balance retraining;IADL retraining;neuromuscular control/coordination retraining;occupation/activity based interventions;passive ROM/stretching;patient/caregiver  "education/training;transfer/mobility retraining;strengthening exercise;ROM/therapeutic exercise;orthotic fabrication/fitting/training  -       User Key  (r) = Recorded By, (t) = Taken By, (c) = Cosigned By    Initials Name Provider Type     Mary Jo Michel, OTR/L Occupational Therapist        Clinical Impression     Row Name 04/02/21 1138          Pain Assessment    Additional Documentation  Pain Scale: Numbers Pre/Post-Treatment (Group)  -     Row Name 04/02/21 1138          Pain Scale: Numbers Pre/Post-Treatment    Pretreatment Pain Rating  10/10  -     Posttreatment Pain Rating  6/10  -     Pain Location - Orientation  generalized  -     Pain Location  back  -     Pre/Posttreatment Pain Comment  \"if I move I have pain\"  -     Pain Intervention(s)  Ambulation/increased activity;Distraction;Rest;Repositioned  -     Row Name 04/02/21 1138          Plan of Care Review    Plan of Care Reviewed With  patient;spouse  -     Outcome Summary  OT eval completed this date. Pt alert and cooperative.  present part of session. Pt dependent with toileting, Pt dependent of 2 for scooting up in the bed. pt mod to max assist of 1-2 to roll in bed. Pt set up and supervision with self feeding min difficulty safely locating items on tray. Pt has a recent wrist fx and reported she took off her cast herself a week ago. For now until further MD clarification OT will treat right wrist with non weight bear and no ROM. Pt could benefit from further skilled OT to reach PLOF/max independence with ADL. Pt may benefit from rehab before d/c home with 24/7 care and further therapy.  -     Row Name 04/02/21 1138          Therapy Assessment/Plan (OT)    Patient/Family Therapy Goal Statement (OT)  to go home  -     Rehab Potential (OT)  fair, will monitor progress closely  -     Criteria for Skilled Therapeutic Interventions Met (OT)  yes;meets criteria  -     Therapy Frequency (OT)  other (see comments) 5-7 days a " week  -     Row Name 04/02/21 1138          Therapy Plan Review/Discharge Plan (OT)    Anticipated Discharge Disposition (OT)  inpatient rehabilitation facility;skilled nursing facility;home with 24/7 care;home with home health  -     Row Name 04/02/21 1138          Vital Signs    Intra Systolic BP Rehab  127  -BH     Intra Treatment Diastolic BP  61  -BH     Intratreatment Heart Rate (beats/min)  87  -BH     Posttreatment Heart Rate (beats/min)  92  -BH     Intra SpO2 (%)  100  -BH     O2 Delivery Intra Treatment  supplemental O2  -     Post SpO2 (%)  94  -BH     Intra Patient Position  Sitting  -BH     Post Patient Position  Sitting  -     Row Name 04/02/21 1138          Positioning and Restraints    Pre-Treatment Position  in bed  -     Post Treatment Position  bed  -     In Bed  sitting;call light within reach;encouraged to call for assist;side rails up x3  -       User Key  (r) = Recorded By, (t) = Taken By, (c) = Cosigned By    Initials Name Provider Type    Mary Jo Trujillo, OTR/L Occupational Therapist        Outcome Measures     Row Name 04/02/21 1138          How much help from another is currently needed...    Putting on and taking off regular lower body clothing?  1  -BH     Bathing (including washing, rinsing, and drying)  2  -BH     Toileting (which includes using toilet bed pan or urinal)  1  -BH     Putting on and taking off regular upper body clothing  2  -     Taking care of personal grooming (such as brushing teeth)  3  -BH     Eating meals  4  -     AM-PAC 6 Clicks Score (OT)  13  -BH     Row Name 04/02/21 1138          Functional Assessment    Outcome Measure Options  AM-PAC 6 Clicks Daily Activity (OT)  -       User Key  (r) = Recorded By, (t) = Taken By, (c) = Cosigned By    Initials Name Provider Type    Mary Jo Trujillo OTR/L Occupational Therapist        Occupational Therapy Education                 Title: PT OT SLP Therapies (In Progress)     Topic: Occupational  Therapy (In Progress)     Point: ADL training (In Progress)     Description:   Instruct learner(s) on proper safety adaptation and remediation techniques during self care or transfers.   Instruct in proper use of assistive devices.              Learning Progress Summary           Patient Acceptance, E, NR by  at 4/2/2021 1223    Comment: Educated about OT and POC. Educated on safety throughout, educated on precuations with right wrist. educated to call for assist.   Family Acceptance, E, NR by  at 4/2/2021 1223    Comment: Educated about OT and POC. Educated on safety throughout, educated on precuations with right wrist. educated to call for assist.                   Point: Home exercise program (Not Started)     Description:   Instruct learner(s) on appropriate technique for monitoring, assisting and/or progressing therapeutic exercises/activities.              Learner Progress:  Not documented in this visit.          Point: Precautions (In Progress)     Description:   Instruct learner(s) on prescribed precautions during self-care and functional transfers.              Learning Progress Summary           Patient Acceptance, E, NR by  at 4/2/2021 1223    Comment: Educated about OT and POC. Educated on safety throughout, educated on precuations with right wrist. educated to call for assist.   Family Acceptance, E, NR by  at 4/2/2021 1223    Comment: Educated about OT and POC. Educated on safety throughout, educated on precuations with right wrist. educated to call for assist.                   Point: Body mechanics (Not Started)     Description:   Instruct learner(s) on proper positioning and spine alignment during self-care, functional mobility activities and/or exercises.              Learner Progress:  Not documented in this visit.                      User Key     Initials Effective Dates Name Provider Type Discipline     06/08/18 -  Mary Jo Michel, OTR/L Occupational Therapist OT              OT  Recommendation and Plan  Planned Therapy Interventions (OT): activity tolerance training, adaptive equipment training, BADL retraining, edema control/reduction, functional balance retraining, IADL retraining, neuromuscular control/coordination retraining, occupation/activity based interventions, passive ROM/stretching, patient/caregiver education/training, transfer/mobility retraining, strengthening exercise, ROM/therapeutic exercise, orthotic fabrication/fitting/training  Therapy Frequency (OT): other (see comments) (5-7 days a week)  Plan of Care Review  Plan of Care Reviewed With: patient, spouse  Outcome Summary: OT eval completed this date. Pt alert and cooperative.  present part of session. Pt dependent with toileting, Pt dependent of 2 for scooting up in the bed. pt mod to max assist of 1-2 to roll in bed. Pt set up and supervision with self feeding min difficulty safely locating items on tray. Pt has a recent wrist fx and reported she took off her cast herself a week ago. For now until further MD clarification OT will treat right wrist with non weight bear and no ROM. Pt could benefit from further skilled OT to reach PLOF/max independence with ADL. Pt may benefit from rehab before d/c home with 24/7 care and further therapy.     Time Calculation:   Time Calculation- OT     Row Name 04/02/21 1317             Time Calculation-     OT Start Time  1138  -      OT Stop Time  1231  -      OT Time Calculation (min)  53 min  -      OT Received On  04/02/21  -      OT Goal Re-Cert Due Date  04/15/21  -        User Key  (r) = Recorded By, (t) = Taken By, (c) = Cosigned By    Initials Name Provider Type     Mary Jo Michel OTR/L Occupational Therapist        Therapy Charges for Today     Code Description Service Date Service Provider Modifiers Qty    [id number]  OT EVAL MOD COMPLEXITY 4 4/2/2021 Mary Jo Michel OTR/L GO 1               ALEK Cabrera/AISHWARYA  4/2/2021

## 2021-04-02 NOTE — SIGNIFICANT NOTE
Notified MD of BG of 400. Orders given to stop D5 NS and start NS and to continue the sliding scale insulin checks with meals.

## 2021-04-02 NOTE — PROGRESS NOTES
FAMILY MEDICINE DAILY PROGRESS NOTE  NAME: Yamileth Slater  : 1960  MRN: [id number]     LOS: 1 day     PROVIDER OF SERVICE: Nirmal Calvillo MD    Chief Complaint: Type 2 diabetes mellitus with hyperosmolar hyperglycemic state (HHS) (CMS/[facility])    Subjective:     Interval History:  History taken from: patient chart  Patient alert and oriented x3 today. Complains of full body and neck soreness. No acute events overnight.     Feeding: Cardiac/consistent carb  Analgesia: Tylenol   Sedation: N/A  Thromboembolic prophylaxis: Heparin  Head up to 30 degrees  Ulcer ppx: Protonix  Glycemic control: Levemir 15 units BID, and sliding scale  Spont. breathing trial: N/A  Bowel regimen: N/A  Indwelling catheter: Urethral  Deescalating antibiotics: Vancomycin and Zosyn      Review of Systems:   Review of Systems   Constitutional: Positive for fatigue. Negative for activity change and appetite change.        Sore   HENT: Negative for congestion and trouble swallowing.    Respiratory: Negative for chest tightness and shortness of breath.    Cardiovascular: Negative for chest pain and palpitations.   Gastrointestinal: Negative for abdominal distention and abdominal pain.   Genitourinary: Negative for difficulty urinating and dysuria.   Musculoskeletal: Positive for neck pain. Negative for arthralgias and myalgias.   Skin: Negative for color change and pallor.   Neurological: Negative for dizziness, light-headedness and headaches.   Psychiatric/Behavioral: Negative for agitation and behavioral problems.       Objective:     Vital Signs  Temp:  [97 °F (36.1 °C)-99.6 °F (37.6 °C)] 97.8 °F (36.6 °C)  Heart Rate:  [] 78  Resp:  [17-22] 18  BP: (105-152)/(54-77) 116/69    Physical Exam  Physical Exam  Constitutional:       General: She is not in acute distress.     Appearance: She is well-developed.   HENT:      Head: Normocephalic and atraumatic.      Right Ear: External ear normal.      Left Ear: External ear normal.       Nose: Nose normal.   Eyes:      Extraocular Movements: Extraocular movements intact.      Pupils: Pupils are equal, round, and reactive to light.   Cardiovascular:      Rate and Rhythm: Normal rate and regular rhythm.      Heart sounds: Normal heart sounds. No murmur heard.   No friction rub. No gallop.    Pulmonary:      Effort: Pulmonary effort is normal. No respiratory distress.      Breath sounds: Normal breath sounds. No wheezing or rales.   Abdominal:      General: Bowel sounds are normal. There is no distension.      Palpations: Abdomen is soft.      Tenderness: There is no abdominal tenderness.   Musculoskeletal:         General: Normal range of motion.      Cervical back: Normal range of motion and neck supple.      Right lower leg: No edema.      Left lower leg: No edema.   Skin:     General: Skin is warm.      Findings: No erythema.   Neurological:      Mental Status: She is alert and oriented to person, place, and time. Mental status is at baseline.   Psychiatric:         Mood and Affect: Mood normal.         Behavior: Behavior normal.         Medication Review    Current Facility-Administered Medications:   •  !Vancomycin Level Draw Needed, , Does not apply, Once, Umesh Giraldo III, MD  •  acetaminophen (TYLENOL) tablet 650 mg, 650 mg, Oral, Q6H PRN, Carline Coffey MD, 650 mg at 04/02/21 0932  •  albuterol (PROVENTIL) nebulizer solution 0.083% 2.5 mg/3mL, 2.5 mg, Nebulization, Q4H PRN, Carline Coffey MD  •  atorvastatin (LIPITOR) tablet 40 mg, 40 mg, Oral, Daily, Carline Coffey MD, 40 mg at 04/02/21 0826  •  clopidogrel (PLAVIX) tablet 75 mg, 75 mg, Oral, Daily, Carline Coffey MD, 75 mg at 04/02/21 0826  •  cyclobenzaprine (FLEXERIL) tablet 10 mg, 10 mg, Oral, BID PRN, Carline Coffey MD, 10 mg at 04/02/21 0932  •  dextrose (D50W) 25 g/ 50mL Intravenous Solution 25 g, 25 g, Intravenous, Q15 Min PRN, Umesh Giraldo III, MD  •  dextrose (GLUTOSE) oral gel 15 g, 15 g, Oral, Q15  Min PRN, Umesh Giraldo III, MD  •  ferrous sulfate EC tablet 324 mg, 324 mg, Oral, Daily With Breakfast, Carline Coffey MD, 324 mg at 04/02/21 0826  •  gabapentin (NEURONTIN) capsule 100 mg, 100 mg, Oral, Q8H, Carline Coffey MD, 100 mg at 04/02/21 0518  •  glucagon (human recombinant) (GLUCAGEN DIAGNOSTIC) injection 1 mg, 1 mg, Subcutaneous, Q15 Min PRN, Umesh Giraldo III, MD  •  heparin (porcine) 5000 UNIT/ML injection 5,000 Units, 5,000 Units, Subcutaneous, Q8H, Umesh Giraldo III, MD, 5,000 Units at 04/02/21 0517  •  insulin aspart (novoLOG) injection 0-9 Units, 0-9 Units, Subcutaneous, TID AC, Umesh Giraldo III, MD, 8 Units at 04/02/21 1154  •  insulin detemir (LEVEMIR) injection 15 Units, 15 Units, Subcutaneous, Q12H, Carline Coffey MD, 15 Units at 04/02/21 0833  •  ipratropium-albuterol (DUO-NEB) nebulizer solution 3 mL, 3 mL, Nebulization, 4x Daily - RT, Carline Coffey MD, 3 mL at 04/02/21 1059  •  isosorbide mononitrate (IMDUR) 24 hr tablet 30 mg, 30 mg, Oral, Daily, Carline Coffey MD, 30 mg at 04/02/21 0826  •  metoprolol tartrate (LOPRESSOR) tablet 100 mg, 100 mg, Oral, Q12H, Carline Coffey MD, 100 mg at 04/01/21 2103  •  montelukast (SINGULAIR) tablet 10 mg, 10 mg, Oral, Nightly, Carline Coffey MD, 10 mg at 04/01/21 2102  •  nitroglycerin (NITROSTAT) SL tablet 0.4 mg, 0.4 mg, Sublingual, PRN, Carline Coffey MD  •  nystatin (MYCOSTATIN) powder, , Topical, TID, Carline Coffey MD, 1 application at 04/02/21 0832  •  ondansetron ODT (ZOFRAN-ODT) disintegrating tablet 4 mg, 4 mg, Oral, Q8H PRN, Carline Coffey MD  •  oxybutynin XL (DITROPAN-XL) 24 hr tablet 5 mg, 5 mg, Oral, Daily, Carline Coffey MD, 5 mg at 04/02/21 0826  •  pantoprazole (PROTONIX) EC tablet 40 mg, 40 mg, Oral, Nightly, Carline Coffey MD, 40 mg at 04/01/21 2103  •  Pharmacy to dose vancomycin, , Does not apply, Continuous PRN, Umesh Giraldo III, MD  •  piperacillin-tazobactam (ZOSYN)  3.375 g/100 mL 0.9% NS IVPB (mbp), 3.375 g, Intravenous, Q8H, Umesh Giraldo III, MD, 3.375 g at 04/02/21 0824  •  potassium chloride (MICRO-K) CR capsule 10 mEq, 10 mEq, Oral, Daily, Carline Coffey MD, 10 mEq at 04/02/21 0827  •  promethazine (PHENERGAN) tablet 25 mg, 25 mg, Oral, Q6H PRN, Carline Coffey MD  •  sodium bicarbonate tablet 325 mg, 325 mg, Oral, BID, Carline Coffey MD, 325 mg at 04/02/21 0826  •  sodium chloride 0.9 % flush 10 mL, 10 mL, Intravenous, PRN, Abel Bryan MD  •  Insert peripheral IV, , , Once **AND** sodium chloride 0.9 % flush 10 mL, 10 mL, Intravenous, PRN, Irasema Hall APRN  •  sodium chloride 0.9 % flush 10 mL, 10 mL, Intravenous, Q12H, Carline Coffey MD, 10 mL at 04/02/21 0832  •  sodium chloride 0.9 % flush 10 mL, 10 mL, Intravenous, PRN, Carline Coffey MD  •  sodium chloride 0.9 % infusion, 125 mL/hr, Intravenous, Continuous, Carline Coffey MD, Last Rate: 125 mL/hr at 04/02/21 1043, 125 mL/hr at 04/02/21 1043  •  vancomycin 1250 mg/250 mL 0.9% NS IVPB ([facility]), 15 mg/kg (Adjusted), Intravenous, Q24H, Umesh Giraldo III, MD     Diagnostic Data    Lab Results (last 24 hours)     Procedure Component Value Units Date/Time    Extra Tubes [[id number]] Collected: 04/02/21 0918    Specimen: Blood, Venous Line Updated: 04/02/21 1030    Narrative:      The following orders were created for panel order Extra Tubes.  Procedure                               Abnormality         Status                     ---------                               -----------         ------                     Green Top (Gel)[[id number]]                                  Final result                 Please view results for these tests on the individual orders.    Green Top (Gel) [[id number]] Collected: 04/02/21 0918    Specimen: Blood Updated: 04/02/21 1030     Extra Tube Hold for add-ons.     Comment: Auto resulted.       Urine Culture - Urine, Urine, Clean Catch [[id number]]   (Normal) Collected: 04/01/21 0142    Specimen: Urine, Clean Catch Updated: 04/02/21 1017     Urine Culture No growth    CBC & Differential [[id number]]  (Abnormal) Collected: 04/02/21 0918    Specimen: Blood Updated: 04/02/21 0927    Narrative:      The following orders were created for panel order CBC & Differential.  Procedure                               Abnormality         Status                     ---------                               -----------         ------                     CBC Auto Differential[[id number]]        Abnormal            Final result                 Please view results for these tests on the individual orders.    CBC Auto Differential [[id number]]  (Abnormal) Collected: 04/02/21 0918    Specimen: Blood Updated: 04/02/21 0927     WBC 8.79 10*3/mm3      RBC 3.15 10*6/mm3      Hemoglobin 8.9 g/dL      Hematocrit 28.3 %      MCV 89.8 fL      MCH 28.3 pg      MCHC 31.4 g/dL      RDW 15.7 %      RDW-SD 51.7 fl      MPV 9.3 fL      Platelets 240 10*3/mm3      Neutrophil % 72.6 %      Lymphocyte % 17.4 %      Monocyte % 6.5 %      Eosinophil % 2.4 %      Basophil % 0.5 %      Immature Grans % 0.6 %      Neutrophils, Absolute 6.39 10*3/mm3      Lymphocytes, Absolute 1.53 10*3/mm3      Monocytes, Absolute 0.57 10*3/mm3      Eosinophils, Absolute 0.21 10*3/mm3      Basophils, Absolute 0.04 10*3/mm3      Immature Grans, Absolute 0.05 10*3/mm3      nRBC 0.0 /100 WBC     Basic Metabolic Panel [[id number]]  (Abnormal) Collected: 04/02/21 0835    Specimen: Blood Updated: 04/02/21 0908     Glucose 343 mg/dL      BUN 34 mg/dL      Creatinine 2.26 mg/dL      Sodium 137 mmol/L      Potassium 4.9 mmol/L      Chloride 110 mmol/L      CO2 19.0 mmol/L      Calcium 8.4 mg/dL      eGFR Non African Amer 22 mL/min/1.73      BUN/Creatinine Ratio 15.0     Anion Gap 8.0 mmol/L     Narrative:      GFR Normal >60  Chronic Kidney Disease <60  Kidney Failure <15      Vancomycin, Random [[id number]]  (Normal) Collected:  04/02/21 0835    Specimen: Blood Updated: 04/02/21 0908     Vancomycin Random 15.80 mcg/mL     Extra Tubes [[id number]] Collected: 04/02/21 0358    Specimen: Blood, Venous Line Updated: 04/02/21 0900    Narrative:      The following orders were created for panel order Extra Tubes.  Procedure                               Abnormality         Status                     ---------                               -----------         ------                     Lavender Top[[id number]]                                     Final result                 Please view results for these tests on the individual orders.    Lavender Top [[id number]] Collected: 04/02/21 0358    Specimen: Blood Updated: 04/02/21 0900     Extra Tube hold for add-on     Comment: Auto resulted       POC Glucose Once [[id number]]  (Abnormal) Collected: 04/02/21 0600    Specimen: Blood Updated: 04/02/21 0613     Glucose 334 mg/dL      Comment: : [phone] DE LA GARZA BREANNAMeter ID: [id number]       Comprehensive Metabolic Panel [[id number]]  (Abnormal) Collected: 04/02/21 0358    Specimen: Blood Updated: 04/02/21 0510     Glucose 366 mg/dL      BUN 37 mg/dL      Creatinine 2.46 mg/dL      Sodium 136 mmol/L      Potassium 4.8 mmol/L      Chloride 109 mmol/L      CO2 20.0 mmol/L      Calcium 8.6 mg/dL      Total Protein 6.0 g/dL      Albumin 2.40 g/dL      ALT (SGPT) 8 U/L      AST (SGOT) 10 U/L      Alkaline Phosphatase 147 U/L      Total Bilirubin 0.2 mg/dL      eGFR Non African Amer 20 mL/min/1.73      Globulin 3.6 gm/dL      A/G Ratio 0.7 g/dL      BUN/Creatinine Ratio 15.0     Anion Gap 7.0 mmol/L     Narrative:      GFR Normal >60  Chronic Kidney Disease <60  Kidney Failure <15      Basic Metabolic Panel [[id number]]  (Abnormal) Collected: 04/02/21 0019    Specimen: Blood Updated: 04/02/21 0106     Glucose 404 mg/dL      BUN 35 mg/dL      Creatinine 2.22 mg/dL      Sodium 138 mmol/L      Potassium 4.9 mmol/L      Chloride 111 mmol/L      CO2 18.0 mmol/L       Calcium 8.3 mg/dL      eGFR Non African Amer 22 mL/min/1.73      BUN/Creatinine Ratio 15.8     Anion Gap 9.0 mmol/L     Narrative:      GFR Normal >60  Chronic Kidney Disease <60  Kidney Failure <15      POC Glucose Once [[id number]]  (Abnormal) Collected: 04/01/21 2129    Specimen: Blood Updated: 04/01/21 2143     Glucose 313 mg/dL      Comment: : [id number] DE LA GARZA BREANNAMeter ID: [id number]       Basic Metabolic Panel [[id number]]  (Abnormal) Collected: 04/01/21 2009    Specimen: Blood Updated: 04/01/21 2032     Glucose 304 mg/dL      BUN 34 mg/dL      Creatinine 2.21 mg/dL      Sodium 139 mmol/L      Potassium 4.9 mmol/L      Chloride 111 mmol/L      CO2 18.0 mmol/L      Calcium 8.2 mg/dL      eGFR Non African Amer 22 mL/min/1.73      BUN/Creatinine Ratio 15.4     Anion Gap 10.0 mmol/L     Narrative:      GFR Normal >60  Chronic Kidney Disease <60  Kidney Failure <15             Imaging Results (Last 24 Hours)     ** No results found for the last 24 hours. **          I reviewed the patient's new clinical results.    Assessment/Plan:     Active Hospital Problems    Diagnosis    • **Type 2 diabetes mellitus with hyperosmolar hyperglycemic state (HHS) (CMS/[facility])      -IV fluids  -Monitoring glucose  -Replete glucose and potassium as needed     • MRSA infection    • Cutaneous candidiasis      - Nystatin     • Community acquired pneumonia of right middle lobe of lung      - Confirmed on CXR  - Vancomycin and zosyn started 4/1/2021 due to possible aspiration  - S. pneumo and legionella negative  - Procalcitonin 0.55 day after admission     • Metabolic acidosis      -Underlying HHS  -No anion gap     • Hyponatremia      -IVF     • Hyperkalemia      -We will monitor and replace         • Hypocalcemia      - Will continue to monitor     • Anemia      -Iron studies indicative of mixed iron deficiency and chronic disease  -Continue home ferrous sulfate       • Acute cystitis      -IV Rocephin 3-day  course  -Follow urine culture     • Acute renal failure superimposed on chronic kidney disease (CMS/HCC)      -Baseline creatinine 1.9  -Continue IV fluids  -Hold nephrotoxic drugs      • Chronic obstructive lung disease (CMS/HCC)      -O2 supplementation  -Home inhalers as needed  -DuoNebs  -CPAP at night     • GERD (gastroesophageal reflux disease)      - IV protonix qday     • Coronary artery disease      -Continue home meds     • Current smoker      -Patch           DVT prophylaxis: Heparin  Code Status and Medical Interventions:   Ordered at: 03/31/21 1811     Code Status:    CPR     Medical Interventions (Level of Support Prior to Arrest):    Full       Plan for disposition:Where: current living arrangements and When:  3-4days      Time: 15 min          This document has been electronically signed by Nirmal Calvillo MD on April 2, 2021 12:52 CDT

## 2021-04-03 ENCOUNTER — APPOINTMENT (OUTPATIENT)
Dept: GENERAL RADIOLOGY | Facility: HOSPITAL | Age: 61
End: 2021-04-03

## 2021-04-03 LAB
ALBUMIN SERPL-MCNC: 2.4 G/DL (ref 3.5–5.2)
ALBUMIN/GLOB SERPL: 0.7 G/DL
ALP SERPL-CCNC: 154 U/L (ref 39–117)
ALT SERPL W P-5'-P-CCNC: 11 U/L (ref 1–33)
ANION GAP SERPL CALCULATED.3IONS-SCNC: 7 MMOL/L (ref 5–15)
ARTERIAL PATENCY WRIST A: ABNORMAL
AST SERPL-CCNC: 14 U/L (ref 1–32)
ATMOSPHERIC PRESS: 757 MMHG
BACTERIA UR QL AUTO: ABNORMAL /HPF
BASE EXCESS BLDA CALC-SCNC: -7.3 MMOL/L (ref 0–2)
BASOPHILS # BLD AUTO: 0.05 10*3/MM3 (ref 0–0.2)
BASOPHILS NFR BLD AUTO: 0.6 % (ref 0–1.5)
BDY SITE: ABNORMAL
BILIRUB SERPL-MCNC: <0.2 MG/DL (ref 0–1.2)
BILIRUB UR QL STRIP: NEGATIVE
BUN SERPL-MCNC: 29 MG/DL (ref 8–23)
BUN/CREAT SERPL: 11.5 (ref 7–25)
CALCIUM SPEC-SCNC: 8.5 MG/DL (ref 8.6–10.5)
CHLORIDE SERPL-SCNC: 109 MMOL/L (ref 98–107)
CK SERPL-CCNC: 34 U/L (ref 20–180)
CLARITY UR: ABNORMAL
CO2 SERPL-SCNC: 20 MMOL/L (ref 22–29)
COLOR UR: YELLOW
CREAT SERPL-MCNC: 2.53 MG/DL (ref 0.57–1)
CREAT UR-MCNC: 14.9 MG/DL
DEPRECATED RDW RBC AUTO: 50.5 FL (ref 37–54)
EOSINOPHIL # BLD AUTO: 0.28 10*3/MM3 (ref 0–0.4)
EOSINOPHIL NFR BLD AUTO: 3.3 % (ref 0.3–6.2)
ERYTHROCYTE [DISTWIDTH] IN BLOOD BY AUTOMATED COUNT: 15.6 % (ref 12.3–15.4)
GAS FLOW AIRWAY: 2 LPM
GFR SERPL CREATININE-BSD FRML MDRD: 19 ML/MIN/1.73
GLOBULIN UR ELPH-MCNC: 3.6 GM/DL
GLUCOSE BLDC GLUCOMTR-MCNC: 224 MG/DL (ref 70–130)
GLUCOSE BLDC GLUCOMTR-MCNC: 273 MG/DL (ref 70–130)
GLUCOSE SERPL-MCNC: 253 MG/DL (ref 65–99)
GLUCOSE UR STRIP-MCNC: ABNORMAL MG/DL
HCO3 BLDA-SCNC: 18.3 MMOL/L (ref 20–26)
HCT VFR BLD AUTO: 27.2 % (ref 34–46.6)
HGB BLD-MCNC: 8.5 G/DL (ref 12–15.9)
HGB UR QL STRIP.AUTO: ABNORMAL
HYALINE CASTS UR QL AUTO: ABNORMAL /LPF
IMM GRANULOCYTES # BLD AUTO: 0.04 10*3/MM3 (ref 0–0.05)
IMM GRANULOCYTES NFR BLD AUTO: 0.5 % (ref 0–0.5)
KETONES UR QL STRIP: ABNORMAL
LEUKOCYTE ESTERASE UR QL STRIP.AUTO: ABNORMAL
LYMPHOCYTES # BLD AUTO: 1.61 10*3/MM3 (ref 0.7–3.1)
LYMPHOCYTES NFR BLD AUTO: 19.3 % (ref 19.6–45.3)
Lab: ABNORMAL
MCH RBC QN AUTO: 27.7 PG (ref 26.6–33)
MCHC RBC AUTO-ENTMCNC: 31.3 G/DL (ref 31.5–35.7)
MCV RBC AUTO: 88.6 FL (ref 79–97)
MODALITY: ABNORMAL
MONOCYTES # BLD AUTO: 0.49 10*3/MM3 (ref 0.1–0.9)
MONOCYTES NFR BLD AUTO: 5.9 % (ref 5–12)
NEUTROPHILS NFR BLD AUTO: 5.89 10*3/MM3 (ref 1.7–7)
NEUTROPHILS NFR BLD AUTO: 70.4 % (ref 42.7–76)
NITRITE UR QL STRIP: NEGATIVE
NRBC BLD AUTO-RTO: 0 /100 WBC (ref 0–0.2)
PCO2 BLDA: 36.4 MM HG (ref 35–45)
PH BLDA: 7.31 PH UNITS (ref 7.35–7.45)
PH UR STRIP.AUTO: 6.5 [PH] (ref 5–9)
PLATELET # BLD AUTO: 263 10*3/MM3 (ref 140–450)
PMV BLD AUTO: 9.4 FL (ref 6–12)
PO2 BLDA: 82.5 MM HG (ref 83–108)
POTASSIUM SERPL-SCNC: 4.4 MMOL/L (ref 3.5–5.2)
PROT SERPL-MCNC: 6 G/DL (ref 6–8.5)
PROT UR QL STRIP: ABNORMAL
PROT UR-MCNC: 81.8 MG/DL
RBC # BLD AUTO: 3.07 10*6/MM3 (ref 3.77–5.28)
RBC # UR: ABNORMAL /HPF
REF LAB TEST METHOD: ABNORMAL
SAO2 % BLDCOA: 96.8 % (ref 94–99)
SODIUM SERPL-SCNC: 136 MMOL/L (ref 136–145)
SODIUM UR-SCNC: 115 MMOL/L
SP GR UR STRIP: 1.01 (ref 1–1.03)
SQUAMOUS #/AREA URNS HPF: ABNORMAL /HPF
UROBILINOGEN UR QL STRIP: ABNORMAL
VENTILATOR MODE: ABNORMAL
WAXY CASTS #/AREA URNS LPF: ABNORMAL /LPF
WBC # BLD AUTO: 8.36 10*3/MM3 (ref 3.4–10.8)
WBC UR QL AUTO: ABNORMAL /HPF
YEAST URNS QL MICRO: ABNORMAL /HPF

## 2021-04-03 PROCEDURE — 82803 BLOOD GASES ANY COMBINATION: CPT

## 2021-04-03 PROCEDURE — 94799 UNLISTED PULMONARY SVC/PX: CPT

## 2021-04-03 PROCEDURE — 73100 X-RAY EXAM OF WRIST: CPT

## 2021-04-03 PROCEDURE — 25010000002 LORAZEPAM PER 2 MG: Performed by: STUDENT IN AN ORGANIZED HEALTH CARE EDUCATION/TRAINING PROGRAM

## 2021-04-03 PROCEDURE — 82550 ASSAY OF CK (CPK): CPT | Performed by: INTERNAL MEDICINE

## 2021-04-03 PROCEDURE — 94760 N-INVAS EAR/PLS OXIMETRY 1: CPT

## 2021-04-03 PROCEDURE — 63710000001 INSULIN ASPART PER 5 UNITS: Performed by: STUDENT IN AN ORGANIZED HEALTH CARE EDUCATION/TRAINING PROGRAM

## 2021-04-03 PROCEDURE — 36600 WITHDRAWAL OF ARTERIAL BLOOD: CPT

## 2021-04-03 PROCEDURE — 82962 GLUCOSE BLOOD TEST: CPT

## 2021-04-03 PROCEDURE — 25010000002 PIPERACILLIN SOD-TAZOBACTAM PER 1 G: Performed by: STUDENT IN AN ORGANIZED HEALTH CARE EDUCATION/TRAINING PROGRAM

## 2021-04-03 PROCEDURE — 84156 ASSAY OF PROTEIN URINE: CPT | Performed by: INTERNAL MEDICINE

## 2021-04-03 PROCEDURE — 99233 SBSQ HOSP IP/OBS HIGH 50: CPT | Performed by: CHIROPRACTOR

## 2021-04-03 PROCEDURE — 85025 COMPLETE CBC W/AUTO DIFF WBC: CPT | Performed by: STUDENT IN AN ORGANIZED HEALTH CARE EDUCATION/TRAINING PROGRAM

## 2021-04-03 PROCEDURE — 25010000002 HEPARIN (PORCINE) PER 1000 UNITS: Performed by: CHIROPRACTOR

## 2021-04-03 PROCEDURE — 25010000002 HYDRALAZINE PER 20 MG: Performed by: CHIROPRACTOR

## 2021-04-03 PROCEDURE — 82570 ASSAY OF URINE CREATININE: CPT | Performed by: INTERNAL MEDICINE

## 2021-04-03 PROCEDURE — 81001 URINALYSIS AUTO W/SCOPE: CPT | Performed by: INTERNAL MEDICINE

## 2021-04-03 PROCEDURE — 25010000002 HEPARIN (PORCINE) PER 1000 UNITS: Performed by: STUDENT IN AN ORGANIZED HEALTH CARE EDUCATION/TRAINING PROGRAM

## 2021-04-03 PROCEDURE — 80053 COMPREHEN METABOLIC PANEL: CPT | Performed by: STUDENT IN AN ORGANIZED HEALTH CARE EDUCATION/TRAINING PROGRAM

## 2021-04-03 PROCEDURE — 87205 SMEAR GRAM STAIN: CPT | Performed by: INTERNAL MEDICINE

## 2021-04-03 PROCEDURE — 63710000001 INSULIN DETEMIR PER 5 UNITS: Performed by: STUDENT IN AN ORGANIZED HEALTH CARE EDUCATION/TRAINING PROGRAM

## 2021-04-03 PROCEDURE — 94660 CPAP INITIATION&MGMT: CPT

## 2021-04-03 PROCEDURE — 73110 X-RAY EXAM OF WRIST: CPT

## 2021-04-03 PROCEDURE — 97530 THERAPEUTIC ACTIVITIES: CPT

## 2021-04-03 PROCEDURE — 63710000001 INSULIN DETEMIR PER 5 UNITS: Performed by: CHIROPRACTOR

## 2021-04-03 PROCEDURE — 84300 ASSAY OF URINE SODIUM: CPT | Performed by: INTERNAL MEDICINE

## 2021-04-03 PROCEDURE — 25010000002 NA FERRIC GLUC CPLX PER 12.5 MG: Performed by: STUDENT IN AN ORGANIZED HEALTH CARE EDUCATION/TRAINING PROGRAM

## 2021-04-03 RX ORDER — NICOTINE POLACRILEX 4 MG
15 LOZENGE BUCCAL
Status: DISCONTINUED | OUTPATIENT
Start: 2021-04-03 | End: 2021-04-03 | Stop reason: SDUPTHER

## 2021-04-03 RX ORDER — HYDRALAZINE HYDROCHLORIDE 20 MG/ML
10 INJECTION INTRAMUSCULAR; INTRAVENOUS EVERY 6 HOURS PRN
Status: COMPLETED | OUTPATIENT
Start: 2021-04-03 | End: 2021-04-03

## 2021-04-03 RX ORDER — DEXTROSE MONOHYDRATE 25 G/50ML
25 INJECTION, SOLUTION INTRAVENOUS
Status: DISCONTINUED | OUTPATIENT
Start: 2021-04-03 | End: 2021-04-03 | Stop reason: SDUPTHER

## 2021-04-03 RX ORDER — CLINDAMYCIN PHOSPHATE 300 MG/50ML
300 INJECTION, SOLUTION INTRAVENOUS EVERY 8 HOURS
Status: DISCONTINUED | OUTPATIENT
Start: 2021-04-03 | End: 2021-04-05

## 2021-04-03 RX ORDER — LORAZEPAM 2 MG/ML
1 INJECTION INTRAMUSCULAR ONCE
Status: COMPLETED | OUTPATIENT
Start: 2021-04-03 | End: 2021-04-03

## 2021-04-03 RX ADMIN — SODIUM CHLORIDE 125 ML/HR: 900 INJECTION, SOLUTION INTRAVENOUS at 05:58

## 2021-04-03 RX ADMIN — LORAZEPAM 1 MG: 2 INJECTION INTRAMUSCULAR; INTRAVENOUS at 21:37

## 2021-04-03 RX ADMIN — GABAPENTIN 100 MG: 100 CAPSULE ORAL at 15:09

## 2021-04-03 RX ADMIN — SODIUM BICARBONATE TAB 325 MG 325 MG: 325 TAB at 08:12

## 2021-04-03 RX ADMIN — CLINDAMYCIN PHOSPHATE 300 MG: 300 INJECTION, SOLUTION INTRAVENOUS at 11:45

## 2021-04-03 RX ADMIN — ISOSORBIDE MONONITRATE 30 MG: 30 TABLET, EXTENDED RELEASE ORAL at 08:12

## 2021-04-03 RX ADMIN — CLINDAMYCIN PHOSPHATE 300 MG: 300 INJECTION, SOLUTION INTRAVENOUS at 17:17

## 2021-04-03 RX ADMIN — IPRATROPIUM BROMIDE AND ALBUTEROL SULFATE 3 ML: 2.5; .5 SOLUTION RESPIRATORY (INHALATION) at 11:19

## 2021-04-03 RX ADMIN — PIPERACILLIN SODIUM AND TAZOBACTAM SODIUM 3.38 G: 3; .375 INJECTION, POWDER, LYOPHILIZED, FOR SOLUTION INTRAVENOUS at 08:16

## 2021-04-03 RX ADMIN — INSULIN DETEMIR 20 UNITS: 100 INJECTION, SOLUTION SUBCUTANEOUS at 08:13

## 2021-04-03 RX ADMIN — HYDRALAZINE HYDROCHLORIDE 10 MG: 20 INJECTION INTRAMUSCULAR; INTRAVENOUS at 15:27

## 2021-04-03 RX ADMIN — INSULIN DETEMIR 20 UNITS: 100 INJECTION, SOLUTION SUBCUTANEOUS at 21:38

## 2021-04-03 RX ADMIN — HEPARIN SODIUM 5000 UNITS: 5000 INJECTION INTRAVENOUS; SUBCUTANEOUS at 05:57

## 2021-04-03 RX ADMIN — FERROUS SULFATE TAB EC 324 MG (65 MG FE EQUIVALENT) 324 MG: 324 (65 FE) TABLET DELAYED RESPONSE at 08:12

## 2021-04-03 RX ADMIN — SODIUM CHLORIDE 250 MG: 9 INJECTION, SOLUTION INTRAVENOUS at 14:05

## 2021-04-03 RX ADMIN — INSULIN ASPART 8 UNITS: 100 INJECTION, SOLUTION INTRAVENOUS; SUBCUTANEOUS at 11:48

## 2021-04-03 RX ADMIN — SODIUM BICARBONATE TAB 325 MG 325 MG: 325 TAB at 21:41

## 2021-04-03 RX ADMIN — GABAPENTIN 100 MG: 100 CAPSULE ORAL at 05:58

## 2021-04-03 RX ADMIN — OXYBUTYNIN CHLORIDE 5 MG: 5 TABLET, EXTENDED RELEASE ORAL at 08:12

## 2021-04-03 RX ADMIN — INSULIN ASPART 5 UNITS: 100 INJECTION, SOLUTION INTRAVENOUS; SUBCUTANEOUS at 08:31

## 2021-04-03 RX ADMIN — PIPERACILLIN SODIUM AND TAZOBACTAM SODIUM 3.38 G: 3; .375 INJECTION, POWDER, LYOPHILIZED, FOR SOLUTION INTRAVENOUS at 02:00

## 2021-04-03 RX ADMIN — IPRATROPIUM BROMIDE AND ALBUTEROL SULFATE 3 ML: 2.5; .5 SOLUTION RESPIRATORY (INHALATION) at 20:19

## 2021-04-03 RX ADMIN — METOPROLOL TARTRATE 100 MG: 50 TABLET, FILM COATED ORAL at 08:12

## 2021-04-03 RX ADMIN — CLOPIDOGREL BISULFATE 75 MG: 75 TABLET ORAL at 08:12

## 2021-04-03 RX ADMIN — IPRATROPIUM BROMIDE AND ALBUTEROL SULFATE 3 ML: 2.5; .5 SOLUTION RESPIRATORY (INHALATION) at 15:59

## 2021-04-03 RX ADMIN — GABAPENTIN 100 MG: 100 CAPSULE ORAL at 21:37

## 2021-04-03 RX ADMIN — CYCLOBENZAPRINE 10 MG: 10 TABLET, FILM COATED ORAL at 15:09

## 2021-04-03 RX ADMIN — HEPARIN SODIUM 5000 UNITS: 5000 INJECTION INTRAVENOUS; SUBCUTANEOUS at 15:10

## 2021-04-03 RX ADMIN — INSULIN ASPART 12 UNITS: 100 INJECTION, SOLUTION INTRAVENOUS; SUBCUTANEOUS at 17:24

## 2021-04-03 RX ADMIN — NYSTATIN 1 APPLICATION: 100000 POWDER TOPICAL at 15:27

## 2021-04-03 RX ADMIN — HEPARIN SODIUM 5000 UNITS: 5000 INJECTION INTRAVENOUS; SUBCUTANEOUS at 21:37

## 2021-04-03 RX ADMIN — MONTELUKAST SODIUM 10 MG: 10 TABLET, COATED ORAL at 21:37

## 2021-04-03 RX ADMIN — NYSTATIN 1 APPLICATION: 100000 POWDER TOPICAL at 08:20

## 2021-04-03 RX ADMIN — ACETAMINOPHEN 650 MG: 325 TABLET, FILM COATED ORAL at 15:10

## 2021-04-03 RX ADMIN — METOPROLOL TARTRATE 100 MG: 50 TABLET, FILM COATED ORAL at 21:37

## 2021-04-03 RX ADMIN — POTASSIUM CHLORIDE 10 MEQ: 750 CAPSULE, EXTENDED RELEASE ORAL at 08:12

## 2021-04-03 RX ADMIN — PANTOPRAZOLE SODIUM 40 MG: 40 TABLET, DELAYED RELEASE ORAL at 21:37

## 2021-04-03 RX ADMIN — ATORVASTATIN CALCIUM 40 MG: 40 TABLET, FILM COATED ORAL at 08:12

## 2021-04-03 RX ADMIN — SODIUM CHLORIDE 125 ML/HR: 900 INJECTION, SOLUTION INTRAVENOUS at 21:38

## 2021-04-03 NOTE — PLAN OF CARE
Goal Outcome Evaluation:  Plan of Care Reviewed With: patient     Outcome Summary: sit-stand-sit min of 1,sit-sup mod of 1,amb ~5' with min of 2 ;md came in to get stool sample and rx ended

## 2021-04-03 NOTE — THERAPY TREATMENT NOTE
Acute Care - Physical Therapy Treatment Note  [facility]     Patient Name: Yamileth Slater  : 1960  MRN: [id number]  Today's Date: 4/3/2021           PT Assessment (last 12 hours)      PT Evaluation and Treatment     Row Name 21 141          Physical Therapy Time and Intention    Subjective Information  no complaints  -LN     Document Type  therapy note (daily note)  -LN     Mode of Treatment  individual therapy;physical therapy  -LN     Comment  nsg ok for rx  -LN     Row Name 21 141          General Information    Patient Profile Reviewed  yes  -LN     Existing Precautions/Restrictions  (S) fall;oxygen therapy device and L/min nwb/pulling on r ue  -LN     Row Name 21 141          Cognition    Affect/Mental Status (Cognitive)  anxious;confused puliing at lines  -LN     Orientation Status (Cognition)  oriented to;person   -LN     Row Name 21 141          Pain Scale: Numbers Pre/Post-Treatment    Pretreatment Pain Rating  0/10 - no pain  -LN     Posttreatment Pain Rating  0/10 - no pain  -LN     Row Name 21 141          Bed Mobility    Scooting/Bridging [place] (Bed Mobility)  minimum assist (75% patient effort) pt able to scoot up in bed with min of 1  -LN     Supine-Sit [place] (Bed Mobility)  not tested  -LN     Sit-Supine [place] (Bed Mobility)  moderate assist (50% patient effort);1 person assist  -LN     Row Name 21 141          Transfers    Chair-Bed [place] (Transfers)  minimum assist (75% patient effort);2 person assist  -LN     Sit-Stand [place] (Transfers)  minimum assist (75% patient effort);1 person assist  -LN     Stand-Sit [place] (Transfers)  minimum assist (75% patient effort)  -LN     Row Name 21 141          Chair-Bed Transfer    Assistive Device (Chair-Bed Transfers)  -- no ad  -LN     Row Name 21 141          Sit-Stand Transfer    Assistive Device (Sit-Stand Transfers)  -- none  -LN     Row  Name 04/03/21 1410          Stand-Sit Transfer    Assistive Device (Stand-Sit Transfers)  -- none  -LN     Row Name 04/03/21 1410          Gait/Stairs (Locomotion)    [place] Level (Gait)  minimum assist (75% patient effort);2 person assist  -LN     Assistive Device (Gait)  -- none  -LN     Distance in Feet (Gait)  chair to bed ~5'  -LN     Row Name 04/03/21 1410          Plan of Care Review    Plan of Care Reviewed With  patient  -LN     Outcome Summary  sit-stand-sit min of 1,sit-sup mod of 1,amb ~5' with min of 2 ;md came in to get stool sample and rx ended  -LN     Row Name 04/03/21 1410          Bed Mobility Goal 1 (PT)    Activity/Assistive Device (Bed Mobility Goal 1, PT)  bed mobility activities, all  -LN     [place] Level/Cues Needed (Bed Mobility Goal 1, PT)  moderate assist (50-74% patient effort);minimum assist (75% or more patient effort);1 person assist  -LN     Time Frame (Bed Mobility Goal 1, PT)  1 week  -LN     Progress/Outcomes (Bed Mobility Goal 1, PT)  goal not met  -LN     Row Name 04/03/21 1410          Transfer Goal 1 (PT)    Activity/Assistive Device (Transfer Goal 1, PT)  bed-to-chair/chair-to-bed  -LN     [place] Level/Cues Needed (Transfer Goal 1, PT)  minimum assist (75% or more patient effort);moderate assist (50-74% patient effort);2 person assist  -LN     Progress/Outcome (Transfer Goal 1, PT)  goal not met  -LN     Row Name 04/03/21 1410          Gait Training Goal 1 (PT)    Activity/Assistive Device (Gait Training Goal 1, PT)  gait (walking locomotion)  -LN     [place] Level (Gait Training Goal 1, PT)  moderate assist (50-74% patient effort);minimum assist (75% or more patient effort);2 person assist  -LN     Time Frame (Gait Training Goal 1, PT)  2 weeks  -LN     Progress/Outcome (Gait Training Goal 1, PT)  goal not met  -LN     Row Name 04/03/21 1410          ROM Goal 1 (PT)    ROM Goal 1 (PT)  [facility] activley for hip, knee and ankle flx ex 20 reps x 2  -LN      Time Frame (ROM Goal 1, PT)  4 days  -LN     Progress/Outcome (ROM Goal 1, PT)  goal not met  -LN     Row Name 04/03/21 1410          Positioning and Restraints    In Bed  notified nsg;supine;call light within reach;encouraged to call for assist;with family/caregiver  -LN     Row Name 04/03/21 1410          Therapy Assessment/Plan (PT)    Rehab Potential (PT)  fair, will monitor progress closely  -LN     Criteria for Skilled Interventions Met (PT)  yes  -LN       User Key  (r) = Recorded By, (t) = Taken By, (c) = Cosigned By    Initials Name Provider Type    LN Lor Tavarez, PTA Physical Therapy Assistant        Physical Therapy Education                 Title: PT OT SLP Therapies (In Progress)     Topic: Physical Therapy (In Progress)     Point: Mobility training (In Progress)     Learning Progress Summary           Patient Acceptance, D,E, NR by JUANCARLOS at 4/1/2021 1056    Comment: POC: instructions for eval/ex to encourage active  mobility                   Point: Home exercise program (Not Started)     Learner Progress:  Not documented in this visit.          Point: Body mechanics (Not Started)     Learner Progress:  Not documented in this visit.          Point: Precautions (Not Started)     Learner Progress:  Not documented in this visit.                      User Key     Initials Effective Dates Name Provider Type Discipline     04/03/18 -  Gauri Anderson, PT Physical Therapist PT              PT Recommendation and Plan  Therapy Frequency (PT):  (5-7 d/w)  Plan of Care Reviewed With: patient  Outcome Summary: sit-stand-sit min of 1,sit-sup mod of 1,amb ~5' with min of 2 ;md came in to get stool sample and rx ended       Time Calculation:   PT Charges     Row Name 04/03/21 1521             Time Calculation    Start Time  1410  -LN      Stop Time  1430  -LN      Time Calculation (min)  20 min  -LN      PT Received On  04/03/21  -LN         Time Calculation- PT    Total Timed Code Minutes- PT  20  minute(s)  -LN        User Key  (r) = Recorded By, (t) = Taken By, (c) = Cosigned By    Initials Name Provider Type    Lor Mcgrath PTA Physical Therapy Assistant        Therapy Charges for Today     Code Description Service Date Service Provider Modifiers Qty    53553643896 HC PT THERAPEUTIC ACT EA 15 MIN 4/2/2021 Lor Tavarez, RENETTA GP 3    17938583363 HC PT THERAPEUTIC ACT EA 15 MIN 4/3/2021 Lor Tavarez, RENETTA GP 1          PT G-Codes  Outcome Measure Options: AM-PAC 6 Clicks Daily Activity (OT)  AM-PAC 6 Clicks Score (OT): 13    Lor Tavarez PTA  4/3/2021

## 2021-04-03 NOTE — PROGRESS NOTES
FAMILY MEDICINE DAILY PROGRESS NOTE    NAME: Yamileth Slater  : 1960  MRN: [id number]      LOS: 2 days     PROVIDER OF SERVICE: Huy Santa MD    Chief Complaint: Type 2 diabetes mellitus with hyperosmolar hyperglycemic state (HHS) (CMS/[facility])    Subjective:     Interval History:  History taken from: patient chart RN  Patient Complaints: Headache, weakness.  Patient Denies: Fever or abdominal pain.  Confusion has improved.  Patient is more alert and cooperative this morning.    Review of Systems:   Review of Systems   Constitutional: Positive for fatigue. Negative for fever.   HENT: Negative for congestion, sinus pressure and sinus pain.    Respiratory: Positive for shortness of breath. Negative for choking.    Cardiovascular: Negative for chest pain.   Gastrointestinal: Negative for abdominal pain.   Musculoskeletal: Positive for back pain.   Neurological: Positive for weakness and headaches.       Objective:     Vital Signs  Temp:  [97.4 °F (36.3 °C)-98.1 °F (36.7 °C)] 97.7 °F (36.5 °C)  Heart Rate:  [70-93] 78  Resp:  [16-24] 22  BP: (101-219)/(57-90) 177/76  Flow (L/min):  [2-3] 2   Body mass index is 50.3 kg/m².    Physical Exam  Physical Exam  Constitutional:       Appearance: She is obese.   HENT:      Head: Normocephalic and atraumatic.      Mouth/Throat:      Mouth: Mucous membranes are moist.   Eyes:      Extraocular Movements: Extraocular movements intact.      Pupils: Pupils are equal, round, and reactive to light.   Neck:      Vascular: No carotid bruit.   Cardiovascular:      Rate and Rhythm: Normal rate and regular rhythm.      Comments: Heart sounds diminished likely due to habitus.  Pulmonary:      Breath sounds: No wheezing or rales.      Comments: Breath sounds diminished difficult to appreciate likely secondary to body habitus.  Abdominal:      Tenderness: There is no abdominal tenderness.   Skin:     Coloration: Skin is not jaundiced.   Neurological:      General: No focal  deficit present.      Mental Status: She is oriented to person, place, and time.         Scheduled Meds   !Vancomycin Level Draw Needed, , Does not apply, Once  atorvastatin, 40 mg, Oral, Daily  clindamycin, 300 mg, Intravenous, TID  clopidogrel, 75 mg, Oral, Daily  ferrous sulfate, 324 mg, Oral, Daily With Breakfast  gabapentin, 100 mg, Oral, Q8H  heparin (porcine), 5,000 Units, Subcutaneous, Q8H  insulin aspart, 0-14 Units, Subcutaneous, TID AC  insulin detemir, 20 Units, Subcutaneous, Q12H  ipratropium-albuterol, 3 mL, Nebulization, 4x Daily - RT  isosorbide mononitrate, 30 mg, Oral, Daily  metoprolol tartrate, 100 mg, Oral, Q12H  montelukast, 10 mg, Oral, Nightly  nystatin, , Topical, TID  oxybutynin XL, 5 mg, Oral, Daily  pantoprazole, 40 mg, Oral, Nightly  potassium chloride, 10 mEq, Oral, Daily  sodium bicarbonate, 325 mg, Oral, BID  sodium chloride, 10 mL, Intravenous, Q12H  sodium chloride, 10 mL, Intravenous, Q12H  sodium chloride, 10 mL, Intravenous, Q12H       PRN Meds   acetaminophen  •  albuterol  •  cyclobenzaprine  •  dextrose  •  dextrose  •  glucagon (human recombinant)  •  nitroglycerin  •  ondansetron ODT  •  Pharmacy to dose vancomycin  •  promethazine  •  sodium chloride  •  [COMPLETED] Insert peripheral IV **AND** sodium chloride  •  sodium chloride  •  sodium chloride      Diagnostic Data    Results from last 7 days   Lab Units 04/03/21  0321   WBC 10*3/mm3 8.36   HEMOGLOBIN g/dL 8.5*   HEMATOCRIT % 27.2*   PLATELETS 10*3/mm3 263   GLUCOSE mg/dL 253*   CREATININE mg/dL 2.53*   BUN mg/dL 29*   SODIUM mmol/L 136   POTASSIUM mmol/L 4.4   AST (SGOT) U/L 14   ALT (SGPT) U/L 11   ALK PHOS U/L 154*   BILIRUBIN mg/dL <0.2   ANION GAP mmol/L 7.0       US Guided Vascular Access    Result Date: 4/3/2021  Impression: Uneventful ultrasound-guided venous access right basilic vein for midline catheter placement. Electronically signed by:  Hugo Russo MD  4/3/2021 7:47 AM CDT Workstation: [id number]        I  reviewed the patient's new clinical results.    Assessment/Plan:     Active Hospital Problems    Diagnosis  POA   • **Type 2 diabetes mellitus with hyperosmolar hyperglycemic state (HHS) (CMS/[facility]) [E11.00, E11.65]  Yes     Priority: High     -IV fluids  -Monitoring glucose  -Replete glucose and potassium as needed  -Mod/High dose SSI  -Levemir 20u     • Community acquired pneumonia of right middle lobe of lung [J18.9]  Yes     Priority: High     - Confirmed on CXR  -Aspiration pneumonitis now suspected.  Patient MRSA positive in nares.  -De-escalate antibiotic treatment to clindamycin 300 mg 3 times daily finish out 10-day course of antibiotic treatment.     • Acute renal failure superimposed on chronic kidney disease (CMS/[facility]) [N17.9, N18.9]  Yes     Priority: High     -Baseline creatinine 1.9 current level 2.53 trending upwards  -Continue IV fluids but increase to 125 mils per hour  -Hold nephrotoxic drugs  -Consult nephrology     • MRSA infection [A49.02]  Yes   • Cutaneous candidiasis [B37.2]  Yes     - Nystatin     • Metabolic acidosis [E87.2]  Yes     -Underlying HHS  -No anion gap     • Hyponatremia [E87.1]  Yes     -IVF     • Hyperkalemia [E87.5]  Yes     -We will monitor and replace         • Hypocalcemia [E83.51]  Yes     - Will continue to monitor     • Anemia [D64.9]  Yes     -Iron studies indicative of mixed iron deficiency and chronic disease  -Continue home ferrous sulfate       • Acute cystitis [N30.00]  Yes     -IV Rocephin 3-day course  -Follow urine culture     • Chronic obstructive lung disease (CMS/[facility]) [J44.9]  Yes     -O2 supplementation  -Home inhalers as needed  -DuoNebs  -CPAP at night     • GERD (gastroesophageal reflux disease) [K21.9]  Yes     - IV protonix qday     • Coronary artery disease [I25.10]  Yes     -Continue home meds     • Current smoker [F17.200]  Yes     -Patch         DVT prophylaxis: Heparin  Code status is   Code Status and Medical Interventions:   Ordered at: 03/31/21  1811     Code Status:    CPR     Medical Interventions (Level of Support Prior to Arrest):    Full       Plan for disposition:Where: home      Time: More than 50% of time spent in counseling and coordination of care:  Total face-to-face/floor time 20 min.  Time spent in counseling 10 min. Counseling included the following topics: Proper lung care.  Proper attention to her blood sugar levels.         This document has been electronically signed by Huy Santa MD on April 3, 2021 10:05 CDT

## 2021-04-03 NOTE — PLAN OF CARE
Goal Outcome Evaluation:     Progress: improving  Outcome Summary: Pt was alert and oriented at beginning of shift.  Progressively confused throughout shift.  VSS.  Glucose still elevated.  Levemir increased.  Continuous monitoring.

## 2021-04-03 NOTE — SIGNIFICANT NOTE
04/03/21 1130   OTHER   Discipline occupational therapist   Rehab Time/Intention   Session Not Performed patient unavailable for treatment     Respiratory in room. OT will follow up for tx as time allows.

## 2021-04-03 NOTE — CONSULTS
[facility]  [address] [place] [address]. [address]   - [phone]  F  [phone]     Consultation         PATIENT  DEMOGRAPHICS   PATIENT NAME: Yamileth Slater                      PHYSICIAN: Timothy Valle MD  : 1960  MRN: [id number]    Subjective   SUBJECTIVE   Referring Provider: Dr. Santa  Reason for Consultation: Acute Kidney Failure on CKD 4  History of present illness:      This is a 61-year-old  female with past medical history of hypertension, diabetes type 2, hyperlipidemia, peripheral vascular disease, COPD, CAD, sleep apnea, morbid obesity, CKD 4 who presented to the emergency room with generalized weakness.    She was noted to have progressive weakness for 2 weeks prior to presentation.  She had multiple falls and was lethargic at home.  Was brought to the emergency room for further evaluation.  In the ER, she was hemodynamically stable.  Labs showed hyperglycemia, hyperkalemia, elevated creatinine, anemia.  ABG showed a pH of 7.25.  Chest x-ray showed prominent bilateral markings more pronounced in the right midlung zone.  CT of the head showed no acute process.  She is admitted to the hospital for further evaluation and management.    From a renal standpoint, she has CKD 4 with a baseline creatinine of 1.8 to 2.0 mg/dL.  She is admitted with a creatinine of 2.65 mg/dL.  Nephrology is consulted for evaluation and management of acute kidney injury.      Past Medical History:   Diagnosis Date   • Anxiety    • Carotid artery stenosis    • Chronic obstructive lung disease (CMS/HCC)    • CKD (chronic kidney disease) stage 4, GFR 15-29 ml/min (CMS/HCC)    • Colonic polyp    • Coronary arteriosclerosis    • Diabetes mellitus (CMS/HCC)    • Diabetic neuropathy (CMS/HCC)    • GERD (gastroesophageal reflux disease)    • Hypercholesterolemia    • Hypertension    • Morbid obesity (CMS/HCC)    • Nephrolithiasis    • Peripheral vascular disease (CMS/HCC)   "  • Sleep apnea    • Substance abuse (CMS/[facility])    • Vitamin D deficiency      Past Surgical History:   Procedure Laterality Date   • CARDIAC CATHETERIZATION N/A 7/14/2020   • CAROTID STENT Left    • COLONOSCOPY     • CORONARY ARTERY BYPASS GRAFT     • CYSTOSCOPY BLADDER STONE LITHOTRIPSY Bilateral      Family History   Problem Relation Age of Onset   • Heart disease Mother    • Heart disease Father    • Heart disease Sister    • Heart disease Brother      Social History     Tobacco Use   • Smoking status: Light Tobacco Smoker     Packs/day: 0.25     Years: 46.00     Pack years: 11.50     Types: Cigarettes   • Smokeless tobacco: Never Used   • Tobacco comment: only smoking 5 a day    Substance Use Topics   • Alcohol use: No   • Drug use: Not Currently     Types: LSD, Marijuana, Methamphetamines     Allergies:  Adhesive tape and Other     REVIEW OF SYSTEMS    Review of Systems   All other systems reviewed and are negative.      Objective   OBJECTIVE   Vital Signs  Temp:  [97.4 °F (36.3 °C)-98.1 °F (36.7 °C)] 97.7 °F (36.5 °C)  Heart Rate:  [70-93] 72  Resp:  [14-24] 14  BP: (127-219)/(61-90) 177/76    Flowsheet Rows      First Filed Value   Admission Height  157.5 cm (62\") Documented at 03/31/2021 1136   Admission Weight  125 kg (275 lb) Documented at 03/31/2021 1136           I/O last 3 completed shifts:  In: 5139.6 [P.O.:1320; I.V.:514.6; IV Piggyback:3305]  Out: 6900 [Urine:6900]    PHYSICAL EXAM    Physical Exam  Vitals and nursing note reviewed.   Constitutional:       General: She is not in acute distress.     Appearance: She is not ill-appearing.   HENT:      Head: Normocephalic and atraumatic.      Mouth/Throat:      Mouth: Mucous membranes are moist.      Pharynx: No oropharyngeal exudate.   Eyes:      General: No scleral icterus.     Conjunctiva/sclera: Conjunctivae normal.      Pupils: Pupils are equal, round, and reactive to light.   Cardiovascular:      Rate and Rhythm: Normal rate and regular rhythm.     "  Heart sounds: Normal heart sounds. No murmur heard.   No friction rub. No gallop.    Pulmonary:      Effort: Pulmonary effort is normal. No respiratory distress.      Breath sounds: Normal breath sounds. No stridor. No wheezing, rhonchi or rales.   Abdominal:      General: Abdomen is flat. There is no distension.      Palpations: Abdomen is soft.      Tenderness: There is no abdominal tenderness.   Musculoskeletal:         General: No swelling.      Cervical back: Neck supple.   Skin:     General: Skin is warm and dry.   Neurological:      General: No focal deficit present.      Mental Status: She is alert and oriented to person, place, and time.         RESULTS   Results Review:    Results from last 7 days   Lab Units 04/03/21  0321 04/02/21  0835 04/02/21  0358 04/01/21  0126   SODIUM mmol/L 136 137 136 140   POTASSIUM mmol/L 4.4 4.9 4.8 4.2   CHLORIDE mmol/L 109* 110* 109* 109*   CO2 mmol/L 20.0* 19.0* 20.0* 21.0*   BUN mg/dL 29* 34* 37* 41*   CREATININE mg/dL 2.53* 2.26* 2.46* 2.26*   CALCIUM mg/dL 8.5* 8.4* 8.6 8.4*   BILIRUBIN mg/dL <0.2  --  0.2 <0.2   ALK PHOS U/L 154*  --  147* 142*   ALT (SGPT) U/L 11  --  8 8   AST (SGOT) U/L 14  --  10 9   GLUCOSE mg/dL 253* 343* 366* 53*       Estimated Creatinine Clearance: 29.2 mL/min (A) (by C-G formula based on SCr of 2.53 mg/dL (H)).    Results from last 7 days   Lab Units 04/01/21  0345 03/31/21  2350 03/31/21  1949   MAGNESIUM mg/dL 1.8 2.0 1.9   PHOSPHORUS mg/dL 3.8 3.4 3.6             Results from last 7 days   Lab Units 04/03/21  0321 04/02/21  0918 04/01/21  0944 03/31/21  2141 03/31/21  1441   WBC 10*3/mm3 8.36 8.79 12.29* 11.45* 12.93*   HEMOGLOBIN g/dL 8.5* 8.9* 9.6* 10.2* 9.7*   PLATELETS 10*3/mm3 263 240 248 242 264       Results from last 7 days   Lab Units 03/31/21  1258   INR  1.01        MEDICATIONS    atorvastatin, 40 mg, Oral, Daily  clindamycin, 300 mg, Intravenous, Q8H  clopidogrel, 75 mg, Oral, Daily  sodium ferric gluconate complex IVPB in 100  mL NS, 250 mg, Intravenous, Daily  gabapentin, 100 mg, Oral, Q8H  heparin (porcine), 5,000 Units, Subcutaneous, Q8H  insulin aspart, 0-14 Units, Subcutaneous, TID AC  insulin detemir, 20 Units, Subcutaneous, Q12H  ipratropium-albuterol, 3 mL, Nebulization, 4x Daily - RT  isosorbide mononitrate, 30 mg, Oral, Daily  metoprolol tartrate, 100 mg, Oral, Q12H  montelukast, 10 mg, Oral, Nightly  nystatin, , Topical, TID  oxybutynin XL, 5 mg, Oral, Daily  pantoprazole, 40 mg, Oral, Nightly  potassium chloride, 10 mEq, Oral, Daily  sodium bicarbonate, 325 mg, Oral, BID  sodium chloride, 10 mL, Intravenous, Q12H  sodium chloride, 10 mL, Intravenous, Q12H  sodium chloride, 10 mL, Intravenous, Q12H      sodium chloride, 125 mL/hr, Last Rate: 125 mL/hr (04/03/21 1137)      Medications Prior to Admission   Medication Sig Dispense Refill Last Dose   • albuterol sulfate  (90 Base) MCG/ACT inhaler Inhale 2 puffs Every 4 (Four) Hours As Needed for Wheezing. 18 g 1    • amoxicillin-clavulanate (Augmentin) 875-125 MG per tablet Take 1 tablet by mouth Daily. 10 tablet 0    • atorvastatin (LIPITOR) 40 MG tablet Take 1 tablet by mouth Daily. 90 tablet 0    • azithromycin (ZITHROMAX) 250 MG tablet       • Blood Glucose Monitoring Suppl ([employer] Blood Glucose Meter) w/Device kit 1 each 3 (Three) Times a Day. 1 kit 3    • cetirizine (zyrTEC) 10 MG tablet Take 1 tablet by mouth Daily. 30 tablet 3    • clopidogrel (PLAVIX) 75 MG tablet Take 1 tablet by mouth Daily. 30 tablet 5    • Continuous Blood Gluc  (FreeStyle Jade 2 [place]) device 1 each Continuous. 1 each 0    • Continuous Blood Gluc Sensor (FreeStyle Jade 2 Sensor) misc 1 each Every 14 (Fourteen) Days. 2 each 11    • cyclobenzaprine (FLEXERIL) 10 MG tablet Take 1 tablet by mouth 2 (Two) Times a Day As Needed for Muscle Spasms. 60 tablet 5    • DULoxetine (Cymbalta) 20 MG capsule Take 1 capsule by mouth Daily. 30 capsule 0    • EASY TOUCH PEN NEEDLES 31G X 8 MM misc       •  Empagliflozin (Jardiance) 25 MG tablet Take 25 mg by mouth Daily. 90 tablet 5    • ferrous sulfate (FeroSul) 325 (65 FE) MG tablet Take 1 tablet by mouth Daily With Breakfast. 30 tablet 3    • gabapentin (NEURONTIN) 800 MG tablet Take 1 tablet by mouth 3 (Three) Times a Day. 90 tablet 0    • glucose blood test strip Use as instructed 100 each 12    • insulin aspart (novoLOG FLEXPEN) 100 UNIT/ML solution pen-injector sc pen Inject 5 Units under the skin into the appropriate area as directed 3 (Three) Times a Day With Meals. 1 pen 1    • Insulin Glargine (Lantus SoloStar) 100 UNIT/ML injection pen Inject 100 Units under the skin into the appropriate area as directed Every 12 (Twelve) Hours. 60 mL 11    • Insulin Pen Needle (NovoFine) 30G X 8 MM misc As directed 4 times daily 100 each 11    • isosorbide mononitrate (IMDUR) 30 MG 24 hr tablet Take 1 tablet by mouth Daily. 90 tablet 2    • lidocaine (LIDODERM) 5 % PLACE 1 PATCH ON THE SKIN AS DIRECTED BY PROVIDER DAILY. REMOVE & DISCARD PATCH WITHIN 12 HOURS OR AS DIRECTED BY MD 30 patch 3    • lisinopril (PRINIVIL,ZESTRIL) 10 MG tablet Take 1 tablet by mouth Daily. 30 tablet 5    • methylPREDNISolone (MEDROL) 4 MG dose pack       • metoprolol tartrate (LOPRESSOR) 100 MG tablet Take 1 tablet by mouth Every 12 (Twelve) Hours for 30 days. (Patient taking differently: Take 100 mg by mouth 2 (Two) Times a Day.) 60 tablet 0    • montelukast (SINGULAIR) 10 MG tablet Take 1 tablet by mouth Every Night. 30 tablet 5    • nitroglycerin (NITROSTAT) 0.4 MG SL tablet Place 1 tablet under the tongue As Needed for Chest Pain. Take no more than 3 doses in 15 minutes. 30 tablet 3    • nystatin (MYCOSTATIN) 954601 UNIT/GM powder Apply  topically to the appropriate area as directed 3 (Three) Times a Day. 15 g 1    • ondansetron ODT (Zofran ODT) 4 MG disintegrating tablet Place 1 tablet on the tongue Every 8 (Eight) Hours As Needed for Nausea or Vomiting. 30 tablet 0    • oxybutynin XL  (DITROPAN-XL) 5 MG 24 hr tablet Take 1 tablet by mouth Daily. 90 tablet 1    • pantoprazole (PROTONIX) 40 MG EC tablet Take 1 tablet by mouth Every Night. 30 tablet 5    • potassium chloride (MICRO-K) 10 MEQ CR capsule Take 1 capsule by mouth Daily. 30 capsule 5    • promethazine (PHENERGAN) 25 MG tablet Take 1 tablet by mouth Every 6 (Six) Hours As Needed for Nausea or Vomiting. 30 tablet 0    • sodium bicarbonate 325 MG tablet Take 1 tablet by mouth 2 (Two) Times a Day. 60 tablet 5      Assessment/Plan   ASSESSMENT / PLAN      Type 2 diabetes mellitus with hyperosmolar hyperglycemic state (HHS) (CMS/[facility])    GERD (gastroesophageal reflux disease)    Coronary artery disease    Chronic obstructive lung disease (CMS/[facility])    Acute renal failure superimposed on chronic kidney disease (CMS/[facility])    Current smoker    Metabolic acidosis    Hyponatremia    Hyperkalemia    Hypocalcemia    Anemia    Acute cystitis    Cutaneous candidiasis    Community acquired pneumonia of right middle lobe of lung    MRSA infection    1. Acute Kidney Failure on CKD 4: Baseline creatinine 1.8-2.0 mg/dl  - Developed ERIKA in the setting of DKA.   - UA showed 2+ protein, 1+ blood, 21-30 RBCs, numerous WBCs. Check urine electrolytes, urine eosinophils and renal US.   - Creatinine is up to 2.5 mg/dl. Urine output is good. Keep IVFs.   - Maintain hemodynamics. Monitor I&Os. Avoid nephrotoxins like NSAIDs and IV contrast.    2. Hypertension:   - Blood pressure is acceptable.     3. Anemia:  - Hemoglobin is low and trending down at 8.5. Monitor closely.    4. DKA: resolved    5. Diabetes type 2    6. Diastolic heart failure    7. Sleep apnea    8. Obesity    Thank you for letting us take care of this patient. Will follow.          I discussed the patients findings and my recommendations with patient and nursing staff      This document has been electronically signed by Timothy Valle MD on April 3, 2021 14:33 CDT

## 2021-04-04 ENCOUNTER — APPOINTMENT (OUTPATIENT)
Dept: ULTRASOUND IMAGING | Facility: HOSPITAL | Age: 61
End: 2021-04-04

## 2021-04-04 LAB
ALBUMIN SERPL-MCNC: 2.7 G/DL (ref 3.5–5.2)
ALBUMIN/GLOB SERPL: 0.7 G/DL
ALP SERPL-CCNC: 185 U/L (ref 39–117)
ALT SERPL W P-5'-P-CCNC: 12 U/L (ref 1–33)
ANION GAP SERPL CALCULATED.3IONS-SCNC: 12 MMOL/L (ref 5–15)
AST SERPL-CCNC: 11 U/L (ref 1–32)
BASOPHILS # BLD AUTO: 0.06 10*3/MM3 (ref 0–0.2)
BASOPHILS NFR BLD AUTO: 0.7 % (ref 0–1.5)
BILIRUB SERPL-MCNC: 0.3 MG/DL (ref 0–1.2)
BUN SERPL-MCNC: 21 MG/DL (ref 8–23)
BUN/CREAT SERPL: 11.2 (ref 7–25)
CALCIUM SPEC-SCNC: 9.4 MG/DL (ref 8.6–10.5)
CHLORIDE SERPL-SCNC: 108 MMOL/L (ref 98–107)
CO2 SERPL-SCNC: 18 MMOL/L (ref 22–29)
CREAT SERPL-MCNC: 1.88 MG/DL (ref 0.57–1)
DEPRECATED RDW RBC AUTO: 49.1 FL (ref 37–54)
EOSINOPHIL # BLD AUTO: 0.26 10*3/MM3 (ref 0–0.4)
EOSINOPHIL NFR BLD AUTO: 3.2 % (ref 0.3–6.2)
ERYTHROCYTE [DISTWIDTH] IN BLOOD BY AUTOMATED COUNT: 15.2 % (ref 12.3–15.4)
GFR SERPL CREATININE-BSD FRML MDRD: 27 ML/MIN/1.73
GLOBULIN UR ELPH-MCNC: 3.9 GM/DL
GLUCOSE BLDC GLUCOMTR-MCNC: 273 MG/DL (ref 70–130)
GLUCOSE BLDC GLUCOMTR-MCNC: 323 MG/DL (ref 70–130)
GLUCOSE BLDC GLUCOMTR-MCNC: 368 MG/DL (ref 70–130)
GLUCOSE SERPL-MCNC: 283 MG/DL (ref 65–99)
HANSEL STAIN: NEGATIVE
HCT VFR BLD AUTO: 30.7 % (ref 34–46.6)
HGB BLD-MCNC: 10 G/DL (ref 12–15.9)
IMM GRANULOCYTES # BLD AUTO: 0.15 10*3/MM3 (ref 0–0.05)
IMM GRANULOCYTES NFR BLD AUTO: 1.8 % (ref 0–0.5)
LYMPHOCYTES # BLD AUTO: 1.98 10*3/MM3 (ref 0.7–3.1)
LYMPHOCYTES NFR BLD AUTO: 24.2 % (ref 19.6–45.3)
MCH RBC QN AUTO: 29 PG (ref 26.6–33)
MCHC RBC AUTO-ENTMCNC: 32.6 G/DL (ref 31.5–35.7)
MCV RBC AUTO: 89 FL (ref 79–97)
MONOCYTES # BLD AUTO: 0.66 10*3/MM3 (ref 0.1–0.9)
MONOCYTES NFR BLD AUTO: 8.1 % (ref 5–12)
NEUTROPHILS NFR BLD AUTO: 5.06 10*3/MM3 (ref 1.7–7)
NEUTROPHILS NFR BLD AUTO: 62 % (ref 42.7–76)
NRBC BLD AUTO-RTO: 0 /100 WBC (ref 0–0.2)
PLATELET # BLD AUTO: 310 10*3/MM3 (ref 140–450)
PMV BLD AUTO: 9.3 FL (ref 6–12)
POTASSIUM SERPL-SCNC: 4.1 MMOL/L (ref 3.5–5.2)
PROT SERPL-MCNC: 6.6 G/DL (ref 6–8.5)
RBC # BLD AUTO: 3.45 10*6/MM3 (ref 3.77–5.28)
SODIUM SERPL-SCNC: 138 MMOL/L (ref 136–145)
WBC # BLD AUTO: 8.17 10*3/MM3 (ref 3.4–10.8)

## 2021-04-04 PROCEDURE — 25010000002 HEPARIN (PORCINE) PER 1000 UNITS: Performed by: CHIROPRACTOR

## 2021-04-04 PROCEDURE — 94760 N-INVAS EAR/PLS OXIMETRY 1: CPT

## 2021-04-04 PROCEDURE — 25010000002 NA FERRIC GLUC CPLX PER 12.5 MG: Performed by: CHIROPRACTOR

## 2021-04-04 PROCEDURE — 94799 UNLISTED PULMONARY SVC/PX: CPT

## 2021-04-04 PROCEDURE — 76775 US EXAM ABDO BACK WALL LIM: CPT

## 2021-04-04 PROCEDURE — 82962 GLUCOSE BLOOD TEST: CPT

## 2021-04-04 PROCEDURE — 63710000001 INSULIN ASPART PER 5 UNITS: Performed by: STUDENT IN AN ORGANIZED HEALTH CARE EDUCATION/TRAINING PROGRAM

## 2021-04-04 PROCEDURE — 97535 SELF CARE MNGMENT TRAINING: CPT

## 2021-04-04 PROCEDURE — 63710000001 INSULIN DETEMIR PER 5 UNITS: Performed by: STUDENT IN AN ORGANIZED HEALTH CARE EDUCATION/TRAINING PROGRAM

## 2021-04-04 PROCEDURE — 97530 THERAPEUTIC ACTIVITIES: CPT

## 2021-04-04 PROCEDURE — 85025 COMPLETE CBC W/AUTO DIFF WBC: CPT | Performed by: CHIROPRACTOR

## 2021-04-04 PROCEDURE — 99233 SBSQ HOSP IP/OBS HIGH 50: CPT | Performed by: CHIROPRACTOR

## 2021-04-04 PROCEDURE — 63710000001 INSULIN DETEMIR PER 5 UNITS: Performed by: CHIROPRACTOR

## 2021-04-04 PROCEDURE — 80053 COMPREHEN METABOLIC PANEL: CPT | Performed by: CHIROPRACTOR

## 2021-04-04 RX ORDER — SODIUM BICARBONATE 650 MG/1
1300 TABLET ORAL 2 TIMES DAILY
Status: DISCONTINUED | OUTPATIENT
Start: 2021-04-04 | End: 2021-04-12 | Stop reason: HOSPADM

## 2021-04-04 RX ADMIN — INSULIN ASPART 16 UNITS: 100 INJECTION, SOLUTION INTRAVENOUS; SUBCUTANEOUS at 17:32

## 2021-04-04 RX ADMIN — INSULIN DETEMIR 22 UNITS: 100 INJECTION, SOLUTION SUBCUTANEOUS at 20:45

## 2021-04-04 RX ADMIN — SODIUM CHLORIDE, PRESERVATIVE FREE 10 ML: 5 INJECTION INTRAVENOUS at 10:20

## 2021-04-04 RX ADMIN — CLINDAMYCIN PHOSPHATE 300 MG: 300 INJECTION, SOLUTION INTRAVENOUS at 02:29

## 2021-04-04 RX ADMIN — SODIUM CHLORIDE, PRESERVATIVE FREE 10 ML: 5 INJECTION INTRAVENOUS at 10:21

## 2021-04-04 RX ADMIN — CLINDAMYCIN PHOSPHATE 300 MG: 300 INJECTION, SOLUTION INTRAVENOUS at 17:31

## 2021-04-04 RX ADMIN — METOPROLOL TARTRATE 100 MG: 50 TABLET, FILM COATED ORAL at 10:16

## 2021-04-04 RX ADMIN — HEPARIN SODIUM 5000 UNITS: 5000 INJECTION INTRAVENOUS; SUBCUTANEOUS at 20:40

## 2021-04-04 RX ADMIN — NYSTATIN: 100000 POWDER TOPICAL at 15:22

## 2021-04-04 RX ADMIN — IPRATROPIUM BROMIDE AND ALBUTEROL SULFATE 3 ML: 2.5; .5 SOLUTION RESPIRATORY (INHALATION) at 11:14

## 2021-04-04 RX ADMIN — ACETAMINOPHEN 650 MG: 325 TABLET, FILM COATED ORAL at 00:37

## 2021-04-04 RX ADMIN — ACETAMINOPHEN 650 MG: 325 TABLET, FILM COATED ORAL at 17:31

## 2021-04-04 RX ADMIN — OXYBUTYNIN CHLORIDE 5 MG: 5 TABLET, EXTENDED RELEASE ORAL at 10:16

## 2021-04-04 RX ADMIN — IPRATROPIUM BROMIDE AND ALBUTEROL SULFATE 3 ML: 2.5; .5 SOLUTION RESPIRATORY (INHALATION) at 19:49

## 2021-04-04 RX ADMIN — GABAPENTIN 100 MG: 100 CAPSULE ORAL at 15:21

## 2021-04-04 RX ADMIN — METOPROLOL TARTRATE 100 MG: 50 TABLET, FILM COATED ORAL at 20:40

## 2021-04-04 RX ADMIN — SODIUM BICARBONATE 1300 MG: 650 TABLET ORAL at 20:40

## 2021-04-04 RX ADMIN — ATORVASTATIN CALCIUM 40 MG: 40 TABLET, FILM COATED ORAL at 10:19

## 2021-04-04 RX ADMIN — CLINDAMYCIN PHOSPHATE 300 MG: 300 INJECTION, SOLUTION INTRAVENOUS at 10:15

## 2021-04-04 RX ADMIN — HEPARIN SODIUM 5000 UNITS: 5000 INJECTION INTRAVENOUS; SUBCUTANEOUS at 15:21

## 2021-04-04 RX ADMIN — INSULIN ASPART 4 UNITS: 100 INJECTION, SOLUTION INTRAVENOUS; SUBCUTANEOUS at 17:32

## 2021-04-04 RX ADMIN — INSULIN ASPART 12 UNITS: 100 INJECTION, SOLUTION INTRAVENOUS; SUBCUTANEOUS at 10:15

## 2021-04-04 RX ADMIN — POTASSIUM CHLORIDE 10 MEQ: 750 CAPSULE, EXTENDED RELEASE ORAL at 10:16

## 2021-04-04 RX ADMIN — HEPARIN SODIUM 5000 UNITS: 5000 INJECTION INTRAVENOUS; SUBCUTANEOUS at 06:02

## 2021-04-04 RX ADMIN — IPRATROPIUM BROMIDE AND ALBUTEROL SULFATE 3 ML: 2.5; .5 SOLUTION RESPIRATORY (INHALATION) at 15:27

## 2021-04-04 RX ADMIN — IPRATROPIUM BROMIDE AND ALBUTEROL SULFATE 3 ML: 2.5; .5 SOLUTION RESPIRATORY (INHALATION) at 08:12

## 2021-04-04 RX ADMIN — SODIUM CHLORIDE 250 MG: 9 INJECTION, SOLUTION INTRAVENOUS at 12:32

## 2021-04-04 RX ADMIN — CLOPIDOGREL BISULFATE 75 MG: 75 TABLET ORAL at 10:18

## 2021-04-04 RX ADMIN — GABAPENTIN 100 MG: 100 CAPSULE ORAL at 20:40

## 2021-04-04 RX ADMIN — INSULIN DETEMIR 20 UNITS: 100 INJECTION, SOLUTION SUBCUTANEOUS at 10:15

## 2021-04-04 RX ADMIN — NYSTATIN: 100000 POWDER TOPICAL at 10:20

## 2021-04-04 RX ADMIN — PANTOPRAZOLE SODIUM 40 MG: 40 TABLET, DELAYED RELEASE ORAL at 20:40

## 2021-04-04 RX ADMIN — ISOSORBIDE MONONITRATE 30 MG: 30 TABLET, EXTENDED RELEASE ORAL at 10:18

## 2021-04-04 RX ADMIN — MONTELUKAST SODIUM 10 MG: 10 TABLET, COATED ORAL at 20:40

## 2021-04-04 NOTE — PROGRESS NOTES
"[facility]  [address] [place] [address]. [address]  T - [phone]  F - [phone]     Progress Note          PATIENT  DEMOGRAPHICS   PATIENT NAME: Yamileth Slater                      PHYSICIAN: BRIDGETT Gagnon  : 1960  MRN: [id number]   LOS: 3 days    Patient Care Team:  Nirmal Calvillo MD as PCP - General (Family Medicine)  Subjective   SUBJECTIVE   No acute events overnight. Patient denies CP or SOA though she does have some increased work of breathing lying in bed. Denies N&V. Reports some constipation. Color is sl pale.         Objective   OBJECTIVE   Vital Signs  Temp:  [96.9 °F (36.1 °C)-98.2 °F (36.8 °C)] 96.9 °F (36.1 °C)  Heart Rate:  [68-96] 96  Resp:  [14-20] 18  BP: (114-189)/(58-90) 184/86    Flowsheet Rows      First Filed Value   Admission Height  157.5 cm (62\") Documented at 2021 1136   Admission Weight  125 kg (275 lb) Documented at 2021 1136           I/O last 3 completed shifts:  In: 5220 [P.O.:600; I.V.:1565; IV Piggyback:3055]  Out: 8525 [Urine:8525]    PHYSICAL EXAM    Physical Exam  Vitals and nursing note reviewed.   Constitutional:       Appearance: She is obese.   HENT:      Head: Normocephalic and atraumatic.   Cardiovascular:      Rate and Rhythm: Normal rate and regular rhythm.   Pulmonary:      Effort: Pulmonary effort is normal.      Breath sounds: Normal breath sounds.   Abdominal:      General: There is distension.   Musculoskeletal:      Right lower leg: Edema present.      Left lower leg: Edema present.   Skin:     General: Skin is warm and dry.      Coloration: Skin is pale.   Neurological:      Mental Status: She is alert. Mental status is at baseline.   Psychiatric:      Comments: irritable         RESULTS   Results Review:    Results from last 7 days   Lab Units 21  0623 21  0321 21  0835 21  0358   SODIUM mmol/L 138 136 137 136   POTASSIUM mmol/L 4.1 4.4 4.9 4.8   CHLORIDE mmol/L 108* " 109* 110* 109*   CO2 mmol/L 18.0* 20.0* 19.0* 20.0*   BUN mg/dL 21 29* 34* 37*   CREATININE mg/dL 1.88* 2.53* 2.26* 2.46*   CALCIUM mg/dL 9.4 8.5* 8.4* 8.6   BILIRUBIN mg/dL 0.3 <0.2  --  0.2   ALK PHOS U/L 185* 154*  --  147*   ALT (SGPT) U/L 12 11  --  8   AST (SGOT) U/L 11 14  --  10   GLUCOSE mg/dL 283* 253* 343* 366*       Estimated Creatinine Clearance: 39 mL/min (A) (by C-G formula based on SCr of 1.88 mg/dL (H)).    Results from last 7 days   Lab Units 04/01/21  0345 03/31/21  2350 03/31/21  1949   MAGNESIUM mg/dL 1.8 2.0 1.9   PHOSPHORUS mg/dL 3.8 3.4 3.6             Results from last 7 days   Lab Units 04/04/21  0623 04/03/21  0321 04/02/21  0918 04/01/21  0944 03/31/21  2141   WBC 10*3/mm3 8.17 8.36 8.79 12.29* 11.45*   HEMOGLOBIN g/dL 10.0* 8.5* 8.9* 9.6* 10.2*   PLATELETS 10*3/mm3 310 263 240 248 242       Results from last 7 days   Lab Units 03/31/21  1258   INR  1.01         Imaging Results (Last 24 Hours)     Procedure Component Value Units Date/Time    US Renal Bilateral [[id number]] Resulted: 04/04/21 0824     Updated: 04/04/21 0824    XR Wrist 3+ View Right [[id number]] Collected: 04/03/21 1936     Updated: 04/03/21 2005    Narrative:        Three view portable right wrist    HISTORY: Fracture follow-up    AP, lateral and oblique views obtained portably at 7:38 PM.    COMPARISON: 12:43 PM    FINDINGS:   Again no fracture or dislocation demonstrated.  No other osseous or articular abnormality.  Atherosclerotic calcifications.      Impression:      CONCLUSION:  Again no fracture or dislocation demonstrated.    [id number]    Electronically signed by:  Jon Patricia MD  4/3/2021 8:04 PM CDT  Workstation: [id number]           MEDICATIONS    atorvastatin, 40 mg, Oral, Daily  clindamycin, 300 mg, Intravenous, Q8H  clopidogrel, 75 mg, Oral, Daily  sodium ferric gluconate complex IVPB in 100 mL NS, 250 mg, Intravenous, Daily  gabapentin, 100 mg, Oral, Q8H  heparin (porcine), 5,000 Units, Subcutaneous,  Q8H  insulin aspart, 0-24 Units, Subcutaneous, TID AC  insulin detemir, 20 Units, Subcutaneous, Q12H  ipratropium-albuterol, 3 mL, Nebulization, 4x Daily - RT  isosorbide mononitrate, 30 mg, Oral, Daily  metoprolol tartrate, 100 mg, Oral, Q12H  montelukast, 10 mg, Oral, Nightly  nystatin, , Topical, TID  oxybutynin XL, 5 mg, Oral, Daily  pantoprazole, 40 mg, Oral, Nightly  potassium chloride, 10 mEq, Oral, Daily  sodium bicarbonate, 325 mg, Oral, BID  sodium chloride, 10 mL, Intravenous, Q12H  sodium chloride, 10 mL, Intravenous, Q12H  sodium chloride, 10 mL, Intravenous, Q12H           Assessment/Plan   ASSESSMENT / PLAN      Type 2 diabetes mellitus with hyperosmolar hyperglycemic state (HHS) (CMS/[facility])    GERD (gastroesophageal reflux disease)    Coronary artery disease    Chronic obstructive lung disease (CMS/[facility])    Acute renal failure superimposed on chronic kidney disease (CMS/[facility])    Current smoker    Metabolic acidosis    Hyponatremia    Hyperkalemia    Hypocalcemia    Anemia    Acute cystitis    Cutaneous candidiasis    Community acquired pneumonia of right middle lobe of lung    MRSA infection    1. Acute Kidney Failure on CKD 4: Baseline creatinine 1.8-2.0 mg/dl  - Developed ERIKA in the setting of DKA.   - UA showed 2+ protein, 1+ blood, 21-30 RBCs, numerous WBCs. Hansels stain is negative.  Urine sodium 115, creatinine 14.9, protein 81.8  - Renal US ordered- report is pending   - Creatinine is 1.88mg/dl- which is her baseline. Diuresing- output 5975 cc last 24 hours. NS at 100cc/hr- will dc IVFs. Wt is down 1 kg last 24 hours.  - Maintain hemodynamics. Monitor I&Os. Avoid nephrotoxins like NSAIDs and IV contrast.     2. Hypertension:   - Blood pressure is increased this morning 184/86- patient has been irritable off and on- - on metoprolol 100mg po bid Isosorbide 30mg qd      3. Anemia:  - Hemoglobin is increasing 10.0- continue to monitor closely.On IV iron     4. DKA: resolved     5. Diabetes type  2     6. Diastolic heart failure     7. Sleep apnea     8. Obesity     9. Metabolic Acidosis- CO down to 18 from 20- on sodium bicarb 325mg po bid- will increase to 1300mg po bid and continue to monitor           This document has been electronically signed by BRIDGETT Gagnon on April 4, 2021 09:40 CDT

## 2021-04-04 NOTE — THERAPY TREATMENT NOTE
Patient Name: Yamileth Slater  : 1960    MRN: [id number]                              Today's Date: 2021       Admit Date: 3/31/2021    Visit Dx:     ICD-10-CM ICD-9-CM   1. Hyperglycemia  R73.9 790.29   2. Urinary tract infection in female  N39.0 599.0   3. Impaired mobility and ADLs  Z74.09 V49.89    Z78.9    4. Impaired functional mobility, balance, gait, and endurance  Z74.09 V49.89     Patient Active Problem List   Diagnosis   • Type 2 diabetes mellitus with diabetic polyneuropathy, with long-term current use of insulin (CMS/[facility])   • Chronic obstructive pulmonary disease (CMS/[facility])   • Low back pain   • Vitamin D deficiency   • Type 2 diabetes mellitus (CMS/[facility])   • Tobacco dependence syndrome   • Surgical follow-up care   • Shoulder joint pain   • Peripheral vascular disease (CMS/[facility])   • Pain   • Neurologic disorder associated with diabetes mellitus (CMS/[facility])   • Morbid obesity with BMI of 50.0-59.9, adult (CMS/[facility])   • Mixed hyperlipidemia   • Kidney stone   • History of colon polyps   • GERD (gastroesophageal reflux disease)   • Excoriated eczema   • Essential hypertension   • Encounter for medication refill   • Dyslipidemia   • Diabetes mellitus (CMS/[facility])   • Coronary artery disease   • Chronic obstructive lung disease (CMS/[facility])   • Chronic folliculitis   • Chest pain   • Mild persistent asthma   • Anxiety states   • 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   • Uncontrolled type 2 diabetes mellitus with complication, with long-term current use of insulin (CMS/[facility])   • Acute renal failure superimposed on chronic kidney disease (CMS/[facility])   • Anemia   • Hypothyroidism   • Carotid artery disease (CMS/[facility])   • Current smoker   • DM type 2 with diabetic peripheral neuropathy (CMS/[facility])   • [place] abuse   • PAD (peripheral artery disease) (CMS/[facility])   • Pneumonia of both lungs due to infectious organism   • S/P CABG x 3   • Intercostal pain   • Venous insufficiency   • Dyspnea on  exertion   • LAFB (left anterior fascicular block)   • Pulmonary hypertension (CMS/H[facility])   • Left hip pain   • Neck pain   • Stage 3b chronic kidney disease (CMS/HCC)   • MIKE and COPD overlap syndrome (CMS/[facility])   • Pneumonia due to COVID-19 virus   • CKD (chronic kidney disease) stage 4, GFR 15-29 ml/min (CMS/[facility])   • Morbid obesity (CMS/[facility])   • Type 2 diabetes mellitus with hyperosmolar hyperglycemic state (HHS) (CMS/[facility])   • Metabolic acidosis   • Hyponatremia   • Hyperkalemia   • Hypocalcemia   • Anemia   • Acute cystitis   • Cutaneous candidiasis   • Community acquired pneumonia of right middle lobe of lung   • MRSA infection     Past Medical History:   Diagnosis Date   • Anxiety    • Carotid artery stenosis    • Chronic obstructive lung disease (CMS/[facility])    • CKD (chronic kidney disease) stage 4, GFR 15-29 ml/min (CMS/[facility])    • Colonic polyp    • Coronary arteriosclerosis    • Diabetes mellitus (CMS/[facility])    • Diabetic neuropathy (CMS/[facility])    • GERD (gastroesophageal reflux disease)    • Hypercholesterolemia    • Hypertension    • Morbid obesity (CMS/[facility])    • Nephrolithiasis    • Peripheral vascular disease (CMS/[facility])    • Sleep apnea    • Substance abuse (CMS/[facility])    • Vitamin D deficiency      Past Surgical History:   Procedure Laterality Date   • CARDIAC CATHETERIZATION N/A 7/14/2020   • CAROTID STENT Left    • COLONOSCOPY     • CORONARY ARTERY BYPASS GRAFT     • CYSTOSCOPY BLADDER STONE LITHOTRIPSY Bilateral      General Information     Row Name 04/04/21 0602          OT Time and Intention    Document Type  therapy note (daily note)  -BB     Mode of Treatment  individual therapy;occupational therapy  -BB     Row Name 04/04/21 0634          General Information    Patient Profile Reviewed  yes  -BB     Existing Precautions/Restrictions  fall R wrist precautions from possible fx hx per chart  -BB     Row Name 04/04/21 0624          Cognition    Orientation Status (Cognition)  oriented to;person able to answer  but slow in response time and detail  -BB     Row Name 04/04/21 0650          Safety Issues, Functional Mobility    Impairments Affecting Function (Mobility)  cognition;balance;coordination;endurance/activity tolerance;sensation/sensory awareness;strength  -BB       User Key  (r) = Recorded By, (t) = Taken By, (c) = Cosigned By    Initials Name Provider Type    Nissa Guillermo COTA/L Occupational Therapy Assistant          Mobility/ADL's     Row Name 04/04/21 0650          Bed Mobility    Bed Mobility  bed mobility (all) activities  -BB     All Activities, [place] (Bed Mobility)  minimum assist (75% patient effort);moderate assist (50% patient effort)  -BB     Rolling Left [place] (Bed Mobility)  moderate assist (50% patient effort);maximum assist (25% patient effort);2 person assist  -BB     Rolling Right [place] (Bed Mobility)  minimum assist (75% patient effort);moderate assist (50% patient effort);1 person assist  -BB     Bed Mobility, Safety Issues  decreased use of arms for pushing/pulling;decreased use of legs for bridging/pushing;impaired trunk control for bed mobility  -BB     Assistive Device (Bed Mobility)  draw sheet;head of bed elevated;bed rails  -BB     Row Name 04/04/21 0650          Activities of Daily Living    BADL Assessment/Intervention  bathing  -BB     Row Name 04/04/21 0650          Mobility    Extremity Weight-bearing Status  right upper extremity  -BB     Right Upper Extremity (Weight-bearing Status)  non weight-bearing (NWB) no ROM non weight bear wrist; pt reports she took her cast off her arm RN to discuss with MD  -BB     Row Name 04/04/21 0650          Bathing Assessment/Intervention    [place] Level (Bathing)  upper body;verbal cues;nonverbal cues (demo/gesture);contact guard assist  -BB     Position (Bathing)  sitting up in bed  -BB       User Key  (r) = Recorded By, (t) = Taken By, (c) = Cosigned By    Initials Name Provider Type    Nissa Guillermo  ISHAN FLORES/L Occupational Therapy Assistant        Obj/Interventions     Row Name 04/04/21 0650          Vision Assessment/Intervention    Visual Impairment/Limitations  corrective lenses for reading hearing WFL  -BB       User Key  (r) = Recorded By, (t) = Taken By, (c) = Cosigned By    Initials Name Provider Type    Nissa Guillermo COTA/L Occupational Therapy Assistant        Goals/Plan     Row Name 04/04/21 0650          Transfer Goal 1 (OT)    Activity/Assistive Device (Transfer Goal 1, OT)  toilet;commode, bedside with drop arms;commode, bedside without drop arms  -BB     [place] Level/Cues Needed (Transfer Goal 1, OT)  minimum assist (75% or more patient effort)  -BB     Time Frame (Transfer Goal 1, OT)  long term goal (LTG);by discharge  -BB     Progress/Outcome (Transfer Goal 1, OT)  goal not met  -BB     Row Name 04/04/21 0650          Bathing Goal 1 (OT)    Activity/Device (Bathing Goal 1, OT)  bathing skills, all  -BB     [place] Level/Cues Needed (Bathing Goal 1, OT)  set-up required;standby assist;verbal cues required  -BB     Time Frame (Bathing Goal 1, OT)  long term goal (LTG);by discharge  -BB     Progress/Outcomes (Bathing Goal 1, OT)  goal not met  -BB     Row Name 04/04/21 0650          Dressing Goal 1 (OT)    Activity/Device (Dressing Goal 1, OT)  dressing skills, all  -BB     [place]/Cues Needed (Dressing Goal 1, OT)  set-up required;standby assist;verbal cues required  -BB     Time Frame (Dressing Goal 1, OT)  long term goal (LTG);by discharge  -BB     Progress/Outcome (Dressing Goal 1, OT)  goal not met  -BB       User Key  (r) = Recorded By, (t) = Taken By, (c) = Cosigned By    Initials Name Provider Type    Nissa Guillermo COTA/L Occupational Therapy Assistant        Clinical Impression     Row Name 04/04/21 0650          Pain Scale: Numbers Pre/Post-Treatment    Pretreatment Pain Rating  0/10 - no pain  -BB     Posttreatment Pain Rating  0/10 - no pain  -BB      Row Name 04/04/21 0650          Plan of Care Review    Plan of Care Reviewed With  patient  -BB     Progress  no change  -BB     Outcome Summary  Pt is pleasantly confused. Pt is CGA for UB bathing with verbal cues. Nsg entered room and needing to check on pt's IV. OT tx ended. Continue OT POC  -BB     Row Name 04/04/21 0650          Therapy Assessment/Plan (OT)    Rehab Potential (OT)  fair, will monitor progress closely  -BB     Criteria for Skilled Therapeutic Interventions Met (OT)  yes;meets criteria  -BB     Therapy Frequency (OT)  other (see comments) 5-7 days a week  -BB     Row Name 04/04/21 0650          Therapy Plan Review/Discharge Plan (OT)    Anticipated Discharge Disposition (OT)  inpatient rehabilitation facility;skilled nursing facility;home with 24/7 care;home with home health  -BB     Row Name 04/04/21 0650          Vital Signs    Pre Patient Position  Side Lying  -BB     Row Name 04/04/21 0650          Positioning and Restraints    Pre-Treatment Position  in bed  -BB     Post Treatment Position  bed  -BB     In Bed  supine;call light within reach;encouraged to call for assist;exit alarm on;with nsg  -BB       User Key  (r) = Recorded By, (t) = Taken By, (c) = Cosigned By    Initials Name Provider Type    Nissa Guillermo COTA/L Occupational Therapy Assistant        Outcome Measures     Row Name 04/04/21 0650          How much help from another is currently needed...    Putting on and taking off regular lower body clothing?  1  -BB     Bathing (including washing, rinsing, and drying)  2  -BB     Toileting (which includes using toilet bed pan or urinal)  1  -BB     Putting on and taking off regular upper body clothing  2  -BB     Taking care of personal grooming (such as brushing teeth)  3  -BB     Eating meals  4  -BB     AM-PAC 6 Clicks Score (OT)  13  -BB       User Key  (r) = Recorded By, (t) = Taken By, (c) = Cosigned By    Initials Name Provider Type    Nissa Guillermo COTA/L  Occupational Therapy Assistant        Occupational Therapy Education                 Title: PT OT SLP Therapies (In Progress)     Topic: Occupational Therapy (In Progress)     Point: ADL training (In Progress)     Description:   Instruct learner(s) on proper safety adaptation and remediation techniques during self care or transfers.   Instruct in proper use of assistive devices.              Learning Progress Summary           Patient Acceptance, E, NR by  at 4/4/2021 1309    Acceptance, E, NR by  at 4/2/2021 1223    Comment: Educated about OT and POC. Educated on safety throughout, educated on precuations with right wrist. educated to call for assist.   Family Acceptance, E, NR by  at 4/2/2021 1223    Comment: Educated about OT and POC. Educated on safety throughout, educated on precuations with right wrist. educated to call for assist.                   Point: Home exercise program (Not Started)     Description:   Instruct learner(s) on appropriate technique for monitoring, assisting and/or progressing therapeutic exercises/activities.              Learner Progress:  Not documented in this visit.          Point: Precautions (In Progress)     Description:   Instruct learner(s) on prescribed precautions during self-care and functional transfers.              Learning Progress Summary           Patient Acceptance, E, NR by  at 4/2/2021 1223    Comment: Educated about OT and POC. Educated on safety throughout, educated on precuations with right wrist. educated to call for assist.   Family Acceptance, E, NR by  at 4/2/2021 1223    Comment: Educated about OT and POC. Educated on safety throughout, educated on precuations with right wrist. educated to call for assist.                   Point: Body mechanics (Not Started)     Description:   Instruct learner(s) on proper positioning and spine alignment during self-care, functional mobility activities and/or exercises.              Learner Progress:  Not  documented in this visit.                      User Key     Initials Effective Dates Name Provider Type Discipline     06/08/18 -  Mary Jo Mihcel OTR/L Occupational Therapist OT     03/07/18 -  Nissa Bey COTA/L Occupational Therapy Assistant OT              OT Recommendation and Plan  Therapy Frequency (OT): other (see comments) (5-7 days a week)  Plan of Care Review  Plan of Care Reviewed With: patient  Progress: no change  Outcome Summary: Pt is pleasantly confused. Pt is CGA for UB bathing with verbal cues. Nsg entered room and needing to check on pt's IV. OT tx ended. Continue OT POC     Time Calculation:   Time Calculation- OT     Row Name 04/04/21 1310             Time Calculation- OT    OT Start Time  0650  -BB      OT Stop Time  0705  -      OT Time Calculation (min)  15 min  -      Total Timed Code Minutes- OT  15 minute(s)  -      OT Received On  04/04/21  -        User Key  (r) = Recorded By, (t) = Taken By, (c) = Cosigned By    Initials Name Provider Type     Nissa Bey COTA/L Occupational Therapy Assistant        Therapy Charges for Today     Code Description Service Date Service Provider Modifiers Qty    [id number] HC OT SELF CARE/MGMT/TRAIN EA 15 MIN 4/4/2021 Nissa Bey COTA/L GO 1               DARBY Mcgovern  4/4/2021

## 2021-04-04 NOTE — PLAN OF CARE
Problem: Adult Inpatient Plan of Care  Goal: Plan of Care Review  Flowsheets  Taken 4/4/2021 1310  Progress: no change  Plan of Care Reviewed With: patient  Taken 4/4/2021 0650  Progress: no change  Plan of Care Reviewed With: patient  Outcome Summary: Pt is pleasantly confused. Pt is CGA for UB bathing with verbal cues. Nsg entered room and needing to check on pt's IV. OT tx ended. Continue OT POC   Goal Outcome Evaluation:  Plan of Care Reviewed With: patient  Progress: no change  Outcome Summary: Pt is pleasantly confused. Pt is CGA for UB bathing with verbal cues. Nsg entered room and needing to check on pt's IV. OT tx ended. Continue OT POC

## 2021-04-04 NOTE — PROGRESS NOTES
FAMILY MEDICINE DAILY PROGRESS NOTE    NAME: Yamileth Slater  : 1960  MRN: [id number]      LOS: 3 days     PROVIDER OF SERVICE: Huy Santa MD    Chief Complaint: Type 2 diabetes mellitus with hyperosmolar hyperglycemic state (HHS) (CMS/[facility])    Subjective:     Interval History:  History taken from: patient chart RN  Patient Complaints: Mild confusion and fatigue.  Patient Denies: Being in any pain at this time.    Review of Systems:   Review of Systems   Constitutional: Positive for fatigue. Negative for fever.   HENT: Negative for trouble swallowing.    Respiratory: Negative for cough, chest tightness and shortness of breath.    Cardiovascular: Negative for chest pain.   Gastrointestinal: Negative for constipation and diarrhea.   Genitourinary: Negative for hematuria.   Neurological: Negative for numbness and headaches.       Objective:     Vital Signs  Temp:  [96.9 °F (36.1 °C)-98.2 °F (36.8 °C)] 96.9 °F (36.1 °C)  Heart Rate:  [75-96] 77  Resp:  [16-20] 20  BP: (114-189)/(58-90) 145/70  Flow (L/min):  [1-2] 1   Body mass index is 49.85 kg/m².    Physical Exam  Physical Exam  Vitals and nursing note reviewed.   Constitutional:       Appearance: She is obese.   HENT:      Nose: No rhinorrhea.   Eyes:      General:         Right eye: No discharge.         Left eye: No discharge.   Cardiovascular:      Rate and Rhythm: Normal rate and regular rhythm.      Comments: Heart sounds diminished due to body habitus.  Pulmonary:      Effort: Pulmonary effort is normal.      Breath sounds: No wheezing or rales.      Comments: Breath sounds diminished due to body habitus.  Abdominal:      Tenderness: There is no abdominal tenderness.      Comments: BS x4.   Skin:     Capillary Refill: Capillary refill takes 2 to 3 seconds.   Neurological:      Mental Status: She is disoriented.      Comments: Patient mental status not at baseline appears to be slightly confused and disoriented.         Scheduled Meds    atorvastatin, 40 mg, Oral, Daily  clindamycin, 300 mg, Intravenous, Q8H  clopidogrel, 75 mg, Oral, Daily  sodium ferric gluconate complex IVPB in 100 mL NS, 250 mg, Intravenous, Daily  gabapentin, 100 mg, Oral, Q8H  heparin (porcine), 5,000 Units, Subcutaneous, Q8H  insulin aspart, 0-24 Units, Subcutaneous, TID AC  insulin detemir, 20 Units, Subcutaneous, Q12H  ipratropium-albuterol, 3 mL, Nebulization, 4x Daily - RT  isosorbide mononitrate, 30 mg, Oral, Daily  metoprolol tartrate, 100 mg, Oral, Q12H  montelukast, 10 mg, Oral, Nightly  nystatin, , Topical, TID  oxybutynin XL, 5 mg, Oral, Daily  pantoprazole, 40 mg, Oral, Nightly  potassium chloride, 10 mEq, Oral, Daily  sodium bicarbonate, 1,300 mg, Oral, BID  sodium chloride, 10 mL, Intravenous, Q12H  sodium chloride, 10 mL, Intravenous, Q12H  sodium chloride, 10 mL, Intravenous, Q12H       PRN Meds   •  acetaminophen  •  albuterol  •  cyclobenzaprine  •  dextrose  •  dextrose  •  glucagon (human recombinant)  •  nitroglycerin  •  ondansetron ODT  •  promethazine  •  sodium chloride  •  [COMPLETED] Insert peripheral IV **AND** sodium chloride  •  sodium chloride  •  sodium chloride      Diagnostic Data    Results from last 7 days   Lab Units 04/04/21  0623   WBC 10*3/mm3 8.17   HEMOGLOBIN g/dL 10.0*   HEMATOCRIT % 30.7*   PLATELETS 10*3/mm3 310   GLUCOSE mg/dL 283*   CREATININE mg/dL 1.88*   BUN mg/dL 21   SODIUM mmol/L 138   POTASSIUM mmol/L 4.1   AST (SGOT) U/L 11   ALT (SGPT) U/L 12   ALK PHOS U/L 185*   BILIRUBIN mg/dL 0.3   ANION GAP mmol/L 12.0       XR Wrist 2 View Right    Result Date: 4/3/2021  CONCLUSION: No fracture or dislocation [id number] Electronically signed by:  Jon Patricia MD  4/3/2021 1:02 PM CDT Workstation: [id number]    XR Wrist 3+ View Right    Result Date: 4/3/2021  CONCLUSION: Again no fracture or dislocation demonstrated. [id number] Electronically signed by:  Jon Patricia MD  4/3/2021 8:04 PM CDT Workstation: [id number]    [employer]  Vascular Access    Result Date: 4/3/2021  Impression: Uneventful ultrasound-guided venous access right basilic vein for midline catheter placement. Electronically signed by:  Hugo Russo MD  4/3/2021 7:47 AM CDT Workstation: [id number]    US Renal Bilateral    Result Date: 4/4/2021  Impression: This study is limited by patient's body habitus. A urinary bladder catheter is noted The bilateral kidneys do not  show any evidence of hydronephrosis or nephrolithiasis. Electronically signed by:  Todd Shelton MD  4/4/2021 12:13 PM CDT Workstation: [id number]        I reviewed the patient's new clinical results.    Assessment/Plan:     Active Hospital Problems    Diagnosis  POA   • **Type 2 diabetes mellitus with hyperosmolar hyperglycemic state (HHS) (CMS/[facility]) [E11.00, E11.65]  Yes     Priority: High     -IV fluids  -Monitoring glucose which continues to be elevated to 283 this morning.  -Potassium levels back to normal this morning at 4.1  -Mod/High dose SSI  -Levemir 20u     • Community acquired pneumonia of right middle lobe of lung [J18.9]  Yes     Priority: High     - Confirmed on CXR  -Aspiration pneumonitis now suspected.  Patient MRSA positive in nares.  -Continue IV clindamycin 300 mg 3 times daily finish out 10-day course of antibiotic treatment.  -Possible source of patient confusion although patient is afebrile at 96.9     • Acute renal failure superimposed on chronic kidney disease (CMS/[facility]) [N17.9, N18.9]  Yes     Priority: High       -Continue IV fluids but increase to 125 mils per hour  -Hold nephrotoxic drugs  -Nephrology consulted and will see patient.  -Creatinine back to baseline.     • MRSA infection [A49.02]  Yes   • Cutaneous candidiasis [B37.2]  Yes     - Nystatin     • Metabolic acidosis [E87.2]  Yes     -Underlying HHS  -No anion gap     • Hyponatremia [E87.1]  Yes     -IVF     • Hyperkalemia [E87.5]  Yes     -We will monitor and replace         • Hypocalcemia [E83.51]  Yes     - Will continue  to monitor     • Anemia [D64.9]  Yes     -Iron studies indicative of mixed iron deficiency and chronic disease  -Continue home ferrous sulfate       • Acute cystitis [N30.00]  Yes     -IV Rocephin 3-day course  -Follow urine culture     • Chronic obstructive lung disease (CMS/HCC) [J44.9]  Yes     -O2 supplementation  -Home inhalers as needed  -DuoNebs  -CPAP at night     • GERD (gastroesophageal reflux disease) [K21.9]  Yes     - IV protonix qday     • Coronary artery disease [I25.10]  Yes     -Continue home meds     • Current smoker [F17.200]  Yes     -Patch         DVT prophylaxis: Heparin  Code status is   Code Status and Medical Interventions:   Ordered at: 03/31/21 1811     Code Status:    CPR     Medical Interventions (Level of Support Prior to Arrest):    Full       Plan for disposition:Where: home      Time: More than 50% of time spent in counseling and coordination of care:  Total face-to-face/floor time 25 min.  Time spent in counseling 15 min. Counseling included the following topics: The importance of close control of her glucose levels         This document has been electronically signed by Huy Santa MD on April 4, 2021 13:20 CDT

## 2021-04-04 NOTE — NURSING NOTE
MD ordered 1:1 sitter r/t pt climbing out of bed, pulling at knowles catheter and pulling out iv's.  Pt family has now arrived and are upset that sitter is at bedside because they are going to be staying the night with the patient.  Patient is currently climbing out of bed with family in room and sitter assisting pt back to bed.  Dr. Giraldo paged to discuss family request to have sitter removed.

## 2021-04-04 NOTE — PLAN OF CARE
Goal Outcome Evaluation:  Plan of Care Reviewed With: patient  Progress: improving  Outcome Summary: Pt treatment included transfer to/from [facility] and training during the process. She has not been OOB per pt very long/ Pt accomplished sitting unsupported on bed, transfer to/from BSC w/out c/o but with min assist of 2. Initially she was going to transfer to chair post BSC but after BSC she was less engaged and able to make definitive decisions so we returned her to bed due to concern she would have difficulty w/ return to bed from chair if up much longer.  Pt able to follow 1-2 step commands w/ cuing and physical assist; Would require full time assist if d/c home at present and would need physically capable caregivers for mobilty; possibly would need lift and w/chair if d/c soon;Rec rehab to home before d/c.

## 2021-04-04 NOTE — PLAN OF CARE
Problem: Adult Inpatient Plan of Care  Goal: Plan of Care Review  Outcome: Ongoing, Progressing  Flowsheets  Taken 4/4/2021 0409 by Coty Marlow RN  Progress: no change  Outcome Summary: pt very confused pulling at lines,pulled midline out and knowles out at beginning of shift, wrapped new iv with coban pt very restless.  Taken 4/3/2021 1410 by Lor Tavarez PTA  Plan of Care Reviewed With: patient     Problem: Adult Inpatient Plan of Care  Goal: Patient-Specific Goal (Individualized)  Outcome: Ongoing, Progressing     Problem: Adult Inpatient Plan of Care  Goal: Absence of Hospital-Acquired Illness or Injury  Outcome: Ongoing, Progressing     Problem: Adult Inpatient Plan of Care  Goal: Absence of Hospital-Acquired Illness or Injury  Intervention: Identify and Manage Fall Risk  Recent Flowsheet Documentation  Taken 4/4/2021 0400 by Coty Marlow RN  Safety Promotion/Fall Prevention:   activity supervised   assistive device/personal items within reach   clutter free environment maintained   fall prevention program maintained   safety round/check completed   nonskid shoes/slippers when out of bed  Taken 4/4/2021 0228 by Coty Marlow RN  Safety Promotion/Fall Prevention:   activity supervised   assistive device/personal items within reach   clutter free environment maintained   fall prevention program maintained   safety round/check completed   nonskid shoes/slippers when out of bed  Taken 4/4/2021 0019 by Coty Marlow RN  Safety Promotion/Fall Prevention:   activity supervised   assistive device/personal items within reach   clutter free environment maintained   fall prevention program maintained   safety round/check completed   nonskid shoes/slippers when out of bed     Problem: Adult Inpatient Plan of Care  Goal: Absence of Hospital-Acquired Illness or Injury  Intervention: Prevent Skin Injury  Recent Flowsheet Documentation  Taken 4/4/2021 0400 by Coty Marlow RN  Body  Position:   position changed independently   semi-prone, left  Taken 4/4/2021 0228 by Coty Marlow RN  Body Position:   position changed independently   supine  Taken 4/4/2021 0019 by Coty Marlow RN  Body Position:   side-lying, left   position changed independently     Problem: Adult Inpatient Plan of Care  Goal: Optimal Comfort and Wellbeing  Outcome: Ongoing, Progressing     Problem: Adult Inpatient Plan of Care  Goal: Readiness for Transition of Care  Outcome: Ongoing, Progressing     Problem: Fall Injury Risk  Goal: Absence of Fall and Fall-Related Injury  Outcome: Ongoing, Progressing     Problem: Fall Injury Risk  Goal: Absence of Fall and Fall-Related Injury  Intervention: Promote Injury-Free Environment  Recent Flowsheet Documentation  Taken 4/4/2021 0400 by Coty Marlow RN  Safety Promotion/Fall Prevention:   activity supervised   assistive device/personal items within reach   clutter free environment maintained   fall prevention program maintained   safety round/check completed   nonskid shoes/slippers when out of bed  Taken 4/4/2021 0228 by Coty Marlow RN  Safety Promotion/Fall Prevention:   activity supervised   assistive device/personal items within reach   clutter free environment maintained   fall prevention program maintained   safety round/check completed   nonskid shoes/slippers when out of bed  Taken 4/4/2021 0019 by Coty Marlow RN  Safety Promotion/Fall Prevention:   activity supervised   assistive device/personal items within reach   clutter free environment maintained   fall prevention program maintained   safety round/check completed   nonskid shoes/slippers when out of bed     Problem: Skin Injury Risk Increased  Goal: Skin Health and Integrity  Outcome: Ongoing, Progressing     Problem: Diabetic Ketoacidosis  Goal: Fluid and Electrolyte Balance with Absence of Ketosis  Outcome: Ongoing, Progressing     Problem: Noninvasive Ventilation Acute  Goal:  Effective Unassisted Ventilation and Oxygenation  Outcome: Ongoing, Progressing   Goal Outcome Evaluation:     Progress: no change  Outcome Summary: pt very confused pulling at lines,pulled midline out and knowles out at beginning of shift, wrapped new iv with coban pt very restless.

## 2021-04-04 NOTE — NURSING NOTE
MD notified of patient removal of knowles. Balloon was intact, no blood noted. New knowles placed and urine return noted. MD also notified of patient fsbs.

## 2021-04-04 NOTE — THERAPY TREATMENT NOTE
Patient Name: Yamileth Slater  : 1960    MRN: [id number]                              Today's Date: 2021     PT Treatment  Admit Date: 3/31/2021    Visit Dx:     ICD-10-CM ICD-9-CM   1. Hyperglycemia  R73.9 790.29   2. Urinary tract infection in female  N39.0 599.0   3. Impaired mobility and ADLs  Z74.09 V49.89    Z78.9    4. Impaired functional mobility, balance, gait, and endurance  Z74.09 V49.89     Patient Active Problem List   Diagnosis   • Type 2 diabetes mellitus with diabetic polyneuropathy, with long-term current use of insulin (CMS/[facility])   • Chronic obstructive pulmonary disease (CMS/[facility])   • Low back pain   • Vitamin D deficiency   • Type 2 diabetes mellitus (CMS/[facility])   • Tobacco dependence syndrome   • Surgical follow-up care   • Shoulder joint pain   • Peripheral vascular disease (CMS/[facility])   • Pain   • Neurologic disorder associated with diabetes mellitus (CMS/[facility])   • Morbid obesity with BMI of 50.0-59.9, adult (CMS/[facility])   • Mixed hyperlipidemia   • Kidney stone   • History of colon polyps   • GERD (gastroesophageal reflux disease)   • Excoriated eczema   • Essential hypertension   • Encounter for medication refill   • Dyslipidemia   • Diabetes mellitus (CMS/[facility])   • Coronary artery disease   • Chronic obstructive lung disease (CMS/[facility])   • Chronic folliculitis   • Chest pain   • Mild persistent asthma   • Anxiety states   • 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   • Uncontrolled type 2 diabetes mellitus with complication, with long-term current use of insulin (CMS/[facility])   • Acute renal failure superimposed on chronic kidney disease (CMS/[facility])   • Anemia   • Hypothyroidism   • Carotid artery disease (CMS/[facility])   • Current smoker   • DM type 2 with diabetic peripheral neuropathy (CMS/[facility])   • [place] abuse   • PAD (peripheral artery disease) (CMS/[facility])   • Pneumonia of both lungs due to infectious organism   • S/P CABG x 3   • Intercostal pain   • Venous insufficiency   •  Dyspnea on exertion   • LAFB (left anterior fascicular block)   • Pulmonary hypertension (CMS/HCC)   • Left hip pain   • Neck pain   • Stage 3b chronic kidney disease (CMS/HCC)   • MIKE and COPD overlap syndrome (CMS/HCC)   • Pneumonia due to COVID-19 virus   • CKD (chronic kidney disease) stage 4, GFR 15-29 ml/min (CMS/HCC)   • Morbid obesity (CMS/HCC)   • Type 2 diabetes mellitus with hyperosmolar hyperglycemic state (HHS) (CMS/[facility])   • Metabolic acidosis   • Hyponatremia   • Hyperkalemia   • Hypocalcemia   • Anemia   • Acute cystitis   • Cutaneous candidiasis   • Community acquired pneumonia of right middle lobe of lung   • MRSA infection     Past Medical History:   Diagnosis Date   • Anxiety    • Carotid artery stenosis    • Chronic obstructive lung disease (CMS/HCC)    • CKD (chronic kidney disease) stage 4, GFR 15-29 ml/min (CMS/HCC)    • Colonic polyp    • Coronary arteriosclerosis    • Diabetes mellitus (CMS/HCC)    • Diabetic neuropathy (CMS/HCC)    • GERD (gastroesophageal reflux disease)    • Hypercholesterolemia    • Hypertension    • Morbid obesity (CMS/HCC)    • Nephrolithiasis    • Peripheral vascular disease (CMS/HCC)    • Sleep apnea    • Substance abuse (CMS/[facility])    • Vitamin D deficiency      Past Surgical History:   Procedure Laterality Date   • CARDIAC CATHETERIZATION N/A 7/14/2020   • CAROTID STENT Left    • COLONOSCOPY     • CORONARY ARTERY BYPASS GRAFT     • CYSTOSCOPY BLADDER STONE LITHOTRIPSY Bilateral      General Information     Row Name 04/04/21 1035          Physical Therapy Time and Intention    Document Type  therapy note (daily note)  -GB     Mode of Treatment  physical therapy;individual therapy  -GB     Row Name 04/04/21 1035          General Information    Patient Profile Reviewed  yes  -GB     Existing Precautions/Restrictions  fall R wrist precautions from possible fx hx per chart  -GB     Row Name 04/04/21 1035          Cognition    Orientation Status (Cognition)  oriented  to;person;place;time;situation able to answer but slow in response time and detail  -GB     Row Name 04/04/21 1035          Safety Issues, Functional Mobility    Safety Issues Affecting Function (Mobility)  awareness of need for assistance;insight into deficits/self-awareness;judgment  -GB     Impairments Affecting Function (Mobility)  cognition;balance;coordination;endurance/activity tolerance;sensation/sensory awareness;strength  -GB     Cognitive Impairments, Mobility Safety/Performance  insight into deficits/self-awareness;problem-solving/reasoning;judgment  -GB     Comment, Safety Issues/Impairments (Mobility)  Pt per chart had wrist fx R but xrays current did not confirm; in her mobility today she spontaneously would use RUE w/o c/o or mention of problems using it.  -GB       User Key  (r) = Recorded By, (t) = Taken By, (c) = Cosigned By    Initials Name Provider Type    Gauri Kohli, PT Physical Therapist        Mobility     Row Name 04/04/21 1035          Bed Mobility    Bed Mobility  bed mobility (all) activities  -GB     All Activities, [place] (Bed Mobility)  minimum assist (75% patient effort);moderate assist (50% patient effort)  -GB     Rolling Right [place] (Bed Mobility)  minimum assist (75% patient effort);moderate assist (50% patient effort);1 person assist  -GB     Scooting/Bridging [place] (Bed Mobility)  minimum assist (75% patient effort);moderate assist (50% patient effort);2 person assist  -GB     Supine-Sit [place] (Bed Mobility)  minimum assist (75% patient effort);moderate assist (50% patient effort);1 person assist  -GB     Sit-Supine [place] (Bed Mobility)  minimum assist (75% patient effort);2 person assist;moderate assist (50% patient effort)  -GB     Comment (Bed Mobility)  pt less talkative, involved post use of bsc, appeared more fatigued tho no specific c/o  -GB     Row Name 04/04/21 1035          Bed-Chair Transfer    Bed-Chair [place]  (Transfers)  minimum assist (75% patient effort);2 person assist  -GB     Assistive Device (Bed-Chair Transfers)  walker, front-wheeled  -GB     Row Name 04/04/21 1035          Sit-Stand Transfer    Sit-Stand [place] (Transfers)  minimum assist (75% patient effort);1 person assist  -GB     Assistive Device (Sit-Stand Transfers)  walker, front-wheeled  -GB     Row Name 04/04/21 1035          Gait/Stairs (Locomotion)    [place] Level (Gait)  minimum assist (75% patient effort);1 person assist;1 person to manage equipment  -     Assistive Device (Gait)  walker, front-wheeled  -GB     Distance in Feet (Gait)  4,4  -GB     Deviations/Abnormal Patterns (Gait)  stride length decreased;gait speed decreased  -GB     Comment (Gait/Stairs)  more in control for [place] [facility]; less alert/attn to detail on return to bed  -       User Key  (r) = Recorded By, (t) = Taken By, (c) = Cosigned By    Initials Name Provider Type    Gauri Kohli PT Physical Therapist        Obj/Interventions     Row Name 04/04/21 1035          Range of Motion Comprehensive    Comment, General Range of Motion  using R wrist/hand w/out c/o in functional activity such as sup to sit, use of walker and reaching for rails; no c/o through process  -     Row Name 04/04/21 1035          Balance    Balance Assessment  sitting static balance;sitting dynamic balance;standing static balance;standing dynamic balance  -GB     Static Sitting Balance  WFL  -GB     Dynamic Sitting Balance  mild impairment  -GB     Static Standing Balance  mild impairment  -GB     Dynamic Standing Balance  mild impairment  -       User Key  (r) = Recorded By, (t) = Taken By, (c) = Cosigned By    Initials Name Provider Type    Gauri Kohli PT Physical Therapist        Goals/Plan     Row Name 04/04/21 1035          Bed Mobility Goal 1 (PT)    Activity/Assistive Device (Bed Mobility Goal 1, PT)  bed mobility activities, all  -GB      [place] Level/Cues Needed (Bed Mobility Goal 1, PT)  moderate assist (50-74% patient effort);minimum assist (75% or more patient effort);1 person assist  -GB     Time Frame (Bed Mobility Goal 1, PT)  1 week  -GB     Progress/Outcomes (Bed Mobility Goal 1, PT)  goal partially met;continuing progress toward goal  -     Row Name 04/04/21 1035          Transfer Goal 1 (PT)    Activity/Assistive Device (Transfer Goal 1, PT)  bed-to-chair/chair-to-bed  -GB     [place] Level/Cues Needed (Transfer Goal 1, PT)  minimum assist (75% or more patient effort);moderate assist (50-74% patient effort);2 person assist  -GB     Progress/Outcome (Transfer Goal 1, PT)  continuing progress toward goal;goal met  -     Row Name 04/04/21 1035          Gait Training Goal 1 (PT)    Activity/Assistive Device (Gait Training Goal 1, PT)  gait (walking locomotion);assistive device use  -GB     [place] Level (Gait Training Goal 1, PT)  moderate assist (50-74% patient effort);minimum assist (75% or more patient effort);2 person assist  -GB     Distance (Gait Training Goal 1, PT)  100 ft or more  -GB     Time Frame (Gait Training Goal 1, PT)  2 weeks  -GB     Progress/Outcome (Gait Training Goal 1, PT)  continuing progress toward goal;goal partially met  -     Row Name 04/04/21 1035          ROM Goal 1 (PT)    ROM Goal 1 (PT)  AAROM WFL activley for hip, knee and ankle flx ex 20 reps x 2  -GB     Time Frame (ROM Goal 1, PT)  4 days  -GB     Progress/Outcome (ROM Goal 1, PT)  goal not met  -     Row Name 04/04/21 1035          Stairs Goal 1 (PT)    Activity/Assistive Device (Stairs Goal 1, PT)  ascending stairs;descending stairs  -GB     [place] Level/Cues Needed (Stairs Goal 1, PT)  modified independence  -GB     Number of Stairs (Stairs Goal 1, PT)  2 or more as lopez;  -GB     Time Frame (Stairs Goal 1, PT)  long term goal (LTG);3 weeks  -GB     Progress/Outcome (Stairs Goal 1, PT)  goal not met  -GB       User Key  (r) =  Recorded By, (t) = Taken By, (c) = Cosigned By    Initials Name Provider Type    GB Gauri Anderson, PT Physical Therapist        Clinical Impression     Row Name 04/04/21 1040          Pain Scale: Numbers Pre/Post-Treatment    Pretreatment Pain Rating  0/10 - no pain  -GB     Posttreatment Pain Rating  0/10 - no pain  -GB     Row Name 04/04/21 1040          Plan of Care Review    Plan of Care Reviewed With  patient  -GB     Outcome Summary  Pt treatment included transfer to/from [facility] and training during the process. She has not been OOB per pt very long/ Pt accomplished sitting unsupported on bed, transfer to/from [facility] w/out c/o but with min assist of 2. Initially she was going to transfer to chair post [facility] but after [facility] she was less engaged and able to make definitive decisions so we returned her to bed due to concern she would have difficulty w/ return to bed from chair if up much longer.  Pt able to follow 1-2 step commands w/ cuing and physical assist; Would require full time assist if d/c home at present and would need physically capable caregivers for mobilty; possibly would need lift and w/chair if d/c soon;Rec rehab to home before d/c.  -GB     Row Name 04/04/21 1040          Therapy Assessment/Plan (PT)    Rehab Potential (PT)  good, to achieve stated therapy goals  -GB     Criteria for Skilled Interventions Met (PT)  yes  -GB     Row Name 04/04/21 1040          Vital Signs    Pre Systolic BP Rehab  145  -GB     Pre Treatment Diastolic BP  70  -GB     Post Systolic BP Rehab  134  -GB     Post Treatment Diastolic BP  60  -GB     Pretreatment Heart Rate (beats/min)  88  -GB     Posttreatment Heart Rate (beats/min)  83  -GB     Pre SpO2 (%)  95  -GB     O2 Delivery Pre Treatment  room air  -GB     O2 Delivery Intra Treatment  room air  -GB     Post SpO2 (%)  96  -GB     O2 Delivery Post Treatment  room air  -GB     Pre Patient Position  Supine  -GB     Intra Patient Position  Supine  -GB     Post  Patient Position  Supine  -GB     Row Name 04/04/21 1040          Positioning and Restraints    Pre-Treatment Position  in bed  -GB     Post Treatment Position  bed  -GB     In Bed  patient within staff view;supine;fowlers;call light within reach;encouraged to call for assist;exit alarm on;side rails up x2  -GB       User Key  (r) = Recorded By, (t) = Taken By, (c) = Cosigned By    Initials Name Provider Type    Gauri Kohli PT Physical Therapist        Outcome Measures     Row Name 04/04/21 1035          How much help from another person do you currently need...    Turning from your back to your side while in flat bed without using bedrails?  3  -GB     Moving from lying on back to sitting on the side of a flat bed without bedrails?  2  -GB     Moving to and from a bed to a chair (including a wheelchair)?  2  -GB     Standing up from a chair using your arms (e.g., wheelchair, bedside chair)?  3  -GB     Climbing 3-5 steps with a railing?  2  -GB     To walk in hospital room?  2  -GB     AM-PAC 6 Clicks Score (PT)  14  -GB       User Key  (r) = Recorded By, (t) = Taken By, (c) = Cosigned By    Initials Name Provider Type    Gauri Kohli PT Physical Therapist        Physical Therapy Education                 Title: PT OT SLP Therapies (In Progress)     Topic: Physical Therapy (In Progress)     Point: Mobility training (In Progress)     Learning Progress Summary           Patient Acceptance, D,E, NR by JUANCARLOS at 4/1/2021 1056    Comment: POC: instructions for eval/ex to encourage active  mobility                   Point: Home exercise program (Not Started)     Learner Progress:  Not documented in this visit.          Point: Body mechanics (Not Started)     Learner Progress:  Not documented in this visit.          Point: Precautions (Not Started)     Learner Progress:  Not documented in this visit.                      User Key     Initials Effective Dates Name Provider Type Discipline     JUANCARLOS 04/03/18 -  Gauri Anderson PT Physical Therapist PT              PT Recommendation and Plan  Planned Therapy Interventions (PT): balance training, bed mobility training, transfer training, gait training, home exercise program, patient/family education, stair training, strengthening  Plan of Care Reviewed With: patient  Progress: improving  Outcome Summary: Pt treatment included transfer to[place]from [facility] and training during the process. She has not been OOB per pt very long/ Pt accomplished sitting unsupported on bed, transfer to/from [facility] w/out c/o but with min assist of 2. Initially she was going to transfer to chair post [facility] but after [facility] she was less engaged and able to make definitive decisions so we returned her to bed due to concern she would have difficulty w/ return to bed from chair if up much longer.  Pt able to follow 1-2 step commands w/ cuing and physical assist; Would require full time assist if d/c home at present and would need physically capable caregivers for mobilty; possibly would need lift and w/chair if d/c soon;Rec rehab to home before d/c.     Time Calculation:   PT Charges     Row Name 04/04/21 1035             Time Calculation    Start Time  1035  -GB      Stop Time  1114  -GB      Time Calculation (min)  39 min  -GB      PT Received On  04/04/21  -GB         Timed Charges    69545 - PT Therapeutic Activity Minutes  39  -GB        User Key  (r) = Recorded By, (t) = Taken By, (c) = Cosigned By    Initials Name Provider Type    Gauri Kohli, PT Physical Therapist        Therapy Charges for Today     Code Description Service Date Service Provider Modifiers Qty    [id number]  PT THERAPEUTIC ACT EA 15 MIN 4/4/2021 Gauri Anderson, PT GP 3          PT G-Codes  Outcome Measure Options: AM-PAC 6 Clicks Daily Activity (OT)  AM-PAC 6 Clicks Score (PT): 14  AM-PAC 6 Clicks Score (OT): 13    Gauri Anderson PT  4/4/2021

## 2021-04-05 ENCOUNTER — APPOINTMENT (OUTPATIENT)
Dept: GENERAL RADIOLOGY | Facility: HOSPITAL | Age: 61
End: 2021-04-05

## 2021-04-05 LAB
ACANTHOCYTES BLD QL SMEAR: ABNORMAL
ALBUMIN SERPL-MCNC: 2.3 G/DL (ref 3.5–5.2)
ALBUMIN/GLOB SERPL: 0.6 G/DL
ALP SERPL-CCNC: 178 U/L (ref 39–117)
ALT SERPL W P-5'-P-CCNC: 11 U/L (ref 1–33)
ANION GAP SERPL CALCULATED.3IONS-SCNC: 11 MMOL/L (ref 5–15)
AST SERPL-CCNC: 13 U/L (ref 1–32)
BASOPHILS # BLD AUTO: 0.09 10*3/MM3 (ref 0–0.2)
BASOPHILS NFR BLD AUTO: 1.1 % (ref 0–1.5)
BILIRUB SERPL-MCNC: 0.2 MG/DL (ref 0–1.2)
BUN SERPL-MCNC: 22 MG/DL (ref 8–23)
BUN/CREAT SERPL: 11.7 (ref 7–25)
CALCIUM SPEC-SCNC: 8.8 MG/DL (ref 8.6–10.5)
CHLORIDE SERPL-SCNC: 105 MMOL/L (ref 98–107)
CO2 SERPL-SCNC: 19 MMOL/L (ref 22–29)
CREAT SERPL-MCNC: 1.88 MG/DL (ref 0.57–1)
DEPRECATED RDW RBC AUTO: 46.5 FL (ref 37–54)
EOSINOPHIL # BLD AUTO: 0.24 10*3/MM3 (ref 0–0.4)
EOSINOPHIL # BLD MANUAL: 0.08 10*3/MM3 (ref 0–0.4)
EOSINOPHIL NFR BLD AUTO: 3 % (ref 0.3–6.2)
EOSINOPHIL NFR BLD MANUAL: 1 % (ref 0.3–6.2)
ERYTHROCYTE [DISTWIDTH] IN BLOOD BY AUTOMATED COUNT: 15 % (ref 12.3–15.4)
GFR SERPL CREATININE-BSD FRML MDRD: 27 ML/MIN/1.73
GLOBULIN UR ELPH-MCNC: 4.1 GM/DL
GLUCOSE BLDC GLUCOMTR-MCNC: 200 MG/DL (ref 70–130)
GLUCOSE BLDC GLUCOMTR-MCNC: 304 MG/DL (ref 70–130)
GLUCOSE BLDC GLUCOMTR-MCNC: 338 MG/DL (ref 70–130)
GLUCOSE BLDC GLUCOMTR-MCNC: 362 MG/DL (ref 70–130)
GLUCOSE BLDC GLUCOMTR-MCNC: 395 MG/DL (ref 70–130)
GLUCOSE BLDC GLUCOMTR-MCNC: 427 MG/DL (ref 70–130)
GLUCOSE BLDC GLUCOMTR-MCNC: 443 MG/DL (ref 70–130)
GLUCOSE SERPL-MCNC: 334 MG/DL (ref 65–99)
HCT VFR BLD AUTO: 28.2 % (ref 34–46.6)
HGB BLD-MCNC: 9.3 G/DL (ref 12–15.9)
IMM GRANULOCYTES # BLD AUTO: 0.42 10*3/MM3 (ref 0–0.05)
IMM GRANULOCYTES NFR BLD AUTO: 5.2 % (ref 0–0.5)
LYMPHOCYTES # BLD AUTO: 2.12 10*3/MM3 (ref 0.7–3.1)
LYMPHOCYTES # BLD MANUAL: 2.58 10*3/MM3 (ref 0.7–3.1)
LYMPHOCYTES NFR BLD AUTO: 26.3 % (ref 19.6–45.3)
LYMPHOCYTES NFR BLD MANUAL: 11 % (ref 5–12)
LYMPHOCYTES NFR BLD MANUAL: 32 % (ref 19.6–45.3)
MCH RBC QN AUTO: 28.2 PG (ref 26.6–33)
MCHC RBC AUTO-ENTMCNC: 33 G/DL (ref 31.5–35.7)
MCV RBC AUTO: 85.5 FL (ref 79–97)
METAMYELOCYTES NFR BLD MANUAL: 2 % (ref 0–0)
MONOCYTES # BLD AUTO: 0.76 10*3/MM3 (ref 0.1–0.9)
MONOCYTES # BLD AUTO: 0.89 10*3/MM3 (ref 0.1–0.9)
MONOCYTES NFR BLD AUTO: 9.4 % (ref 5–12)
NEUTROPHILS # BLD AUTO: 4.35 10*3/MM3 (ref 1.7–7)
NEUTROPHILS NFR BLD AUTO: 4.42 10*3/MM3 (ref 1.7–7)
NEUTROPHILS NFR BLD AUTO: 55 % (ref 42.7–76)
NEUTROPHILS NFR BLD MANUAL: 53 % (ref 42.7–76)
NEUTS BAND NFR BLD MANUAL: 1 % (ref 0–5)
NRBC BLD AUTO-RTO: 0.6 /100 WBC (ref 0–0.2)
PLATELET # BLD AUTO: 294 10*3/MM3 (ref 140–450)
PMV BLD AUTO: 9.4 FL (ref 6–12)
POLYCHROMASIA BLD QL SMEAR: ABNORMAL
POTASSIUM SERPL-SCNC: 3.9 MMOL/L (ref 3.5–5.2)
PROT SERPL-MCNC: 6.4 G/DL (ref 6–8.5)
RBC # BLD AUTO: 3.3 10*6/MM3 (ref 3.77–5.28)
SMALL PLATELETS BLD QL SMEAR: ADEQUATE
SODIUM SERPL-SCNC: 135 MMOL/L (ref 136–145)
WBC # BLD AUTO: 8.05 10*3/MM3 (ref 3.4–10.8)
WBC MORPH BLD: NORMAL

## 2021-04-05 PROCEDURE — 72220 X-RAY EXAM SACRUM TAILBONE: CPT

## 2021-04-05 PROCEDURE — 80053 COMPREHEN METABOLIC PANEL: CPT | Performed by: CHIROPRACTOR

## 2021-04-05 PROCEDURE — 63710000001 INSULIN ASPART PER 5 UNITS: Performed by: STUDENT IN AN ORGANIZED HEALTH CARE EDUCATION/TRAINING PROGRAM

## 2021-04-05 PROCEDURE — 63710000001 INSULIN DETEMIR PER 5 UNITS: Performed by: STUDENT IN AN ORGANIZED HEALTH CARE EDUCATION/TRAINING PROGRAM

## 2021-04-05 PROCEDURE — 71046 X-RAY EXAM CHEST 2 VIEWS: CPT

## 2021-04-05 PROCEDURE — 97110 THERAPEUTIC EXERCISES: CPT

## 2021-04-05 PROCEDURE — 63710000001 ONDANSETRON ODT 4 MG TABLET DISPERSIBLE: Performed by: CHIROPRACTOR

## 2021-04-05 PROCEDURE — 85007 BL SMEAR W/DIFF WBC COUNT: CPT | Performed by: CHIROPRACTOR

## 2021-04-05 PROCEDURE — 94799 UNLISTED PULMONARY SVC/PX: CPT

## 2021-04-05 PROCEDURE — 25010000002 HEPARIN (PORCINE) PER 1000 UNITS: Performed by: CHIROPRACTOR

## 2021-04-05 PROCEDURE — 82962 GLUCOSE BLOOD TEST: CPT

## 2021-04-05 PROCEDURE — 97116 GAIT TRAINING THERAPY: CPT

## 2021-04-05 PROCEDURE — 85025 COMPLETE CBC W/AUTO DIFF WBC: CPT | Performed by: CHIROPRACTOR

## 2021-04-05 PROCEDURE — 25010000002 NA FERRIC GLUC CPLX PER 12.5 MG: Performed by: CHIROPRACTOR

## 2021-04-05 PROCEDURE — 97535 SELF CARE MNGMENT TRAINING: CPT

## 2021-04-05 PROCEDURE — 99232 SBSQ HOSP IP/OBS MODERATE 35: CPT | Performed by: STUDENT IN AN ORGANIZED HEALTH CARE EDUCATION/TRAINING PROGRAM

## 2021-04-05 RX ORDER — CLINDAMYCIN HYDROCHLORIDE 150 MG/1
600 CAPSULE ORAL EVERY 8 HOURS SCHEDULED
Status: DISCONTINUED | OUTPATIENT
Start: 2021-04-05 | End: 2021-04-12 | Stop reason: HOSPADM

## 2021-04-05 RX ADMIN — SODIUM CHLORIDE 250 MG: 9 INJECTION, SOLUTION INTRAVENOUS at 09:39

## 2021-04-05 RX ADMIN — POTASSIUM CHLORIDE 10 MEQ: 750 CAPSULE, EXTENDED RELEASE ORAL at 09:35

## 2021-04-05 RX ADMIN — INSULIN DETEMIR 30 UNITS: 100 INJECTION, SOLUTION SUBCUTANEOUS at 20:51

## 2021-04-05 RX ADMIN — CLOPIDOGREL BISULFATE 75 MG: 75 TABLET ORAL at 09:35

## 2021-04-05 RX ADMIN — CYCLOBENZAPRINE 10 MG: 10 TABLET, FILM COATED ORAL at 00:03

## 2021-04-05 RX ADMIN — SODIUM CHLORIDE, PRESERVATIVE FREE 10 ML: 5 INJECTION INTRAVENOUS at 09:37

## 2021-04-05 RX ADMIN — NYSTATIN 1 APPLICATION: 100000 POWDER TOPICAL at 17:00

## 2021-04-05 RX ADMIN — CLINDAMYCIN HYDROCHLORIDE 600 MG: 150 CAPSULE ORAL at 13:44

## 2021-04-05 RX ADMIN — GABAPENTIN 100 MG: 100 CAPSULE ORAL at 13:45

## 2021-04-05 RX ADMIN — HEPARIN SODIUM 5000 UNITS: 5000 INJECTION INTRAVENOUS; SUBCUTANEOUS at 06:28

## 2021-04-05 RX ADMIN — MONTELUKAST SODIUM 10 MG: 10 TABLET, COATED ORAL at 20:45

## 2021-04-05 RX ADMIN — ONDANSETRON 4 MG: 4 TABLET, ORALLY DISINTEGRATING ORAL at 15:01

## 2021-04-05 RX ADMIN — IPRATROPIUM BROMIDE AND ALBUTEROL SULFATE 3 ML: 2.5; .5 SOLUTION RESPIRATORY (INHALATION) at 19:49

## 2021-04-05 RX ADMIN — HEPARIN SODIUM 5000 UNITS: 5000 INJECTION INTRAVENOUS; SUBCUTANEOUS at 13:45

## 2021-04-05 RX ADMIN — METOPROLOL TARTRATE 100 MG: 50 TABLET, FILM COATED ORAL at 20:45

## 2021-04-05 RX ADMIN — ACETAMINOPHEN 650 MG: 325 TABLET, FILM COATED ORAL at 00:04

## 2021-04-05 RX ADMIN — NYSTATIN 1 APPLICATION: 100000 POWDER TOPICAL at 20:45

## 2021-04-05 RX ADMIN — SODIUM BICARBONATE 1300 MG: 650 TABLET ORAL at 09:35

## 2021-04-05 RX ADMIN — PANTOPRAZOLE SODIUM 40 MG: 40 TABLET, DELAYED RELEASE ORAL at 20:45

## 2021-04-05 RX ADMIN — ATORVASTATIN CALCIUM 40 MG: 40 TABLET, FILM COATED ORAL at 09:35

## 2021-04-05 RX ADMIN — INSULIN ASPART 4 UNITS: 100 INJECTION, SOLUTION INTRAVENOUS; SUBCUTANEOUS at 17:23

## 2021-04-05 RX ADMIN — ACETAMINOPHEN 650 MG: 325 TABLET, FILM COATED ORAL at 21:03

## 2021-04-05 RX ADMIN — IPRATROPIUM BROMIDE AND ALBUTEROL SULFATE 3 ML: 2.5; .5 SOLUTION RESPIRATORY (INHALATION) at 13:58

## 2021-04-05 RX ADMIN — ISOSORBIDE MONONITRATE 30 MG: 30 TABLET, EXTENDED RELEASE ORAL at 09:35

## 2021-04-05 RX ADMIN — INSULIN ASPART 4 UNITS: 100 INJECTION, SOLUTION INTRAVENOUS; SUBCUTANEOUS at 09:35

## 2021-04-05 RX ADMIN — CLINDAMYCIN HYDROCHLORIDE 600 MG: 150 CAPSULE ORAL at 22:48

## 2021-04-05 RX ADMIN — SODIUM CHLORIDE, PRESERVATIVE FREE 10 ML: 5 INJECTION INTRAVENOUS at 20:46

## 2021-04-05 RX ADMIN — GABAPENTIN 100 MG: 100 CAPSULE ORAL at 22:48

## 2021-04-05 RX ADMIN — OXYBUTYNIN CHLORIDE 5 MG: 5 TABLET, EXTENDED RELEASE ORAL at 09:35

## 2021-04-05 RX ADMIN — GABAPENTIN 100 MG: 100 CAPSULE ORAL at 06:29

## 2021-04-05 RX ADMIN — HEPARIN SODIUM 5000 UNITS: 5000 INJECTION INTRAVENOUS; SUBCUTANEOUS at 22:47

## 2021-04-05 RX ADMIN — INSULIN ASPART 16 UNITS: 100 INJECTION, SOLUTION INTRAVENOUS; SUBCUTANEOUS at 09:36

## 2021-04-05 RX ADMIN — INSULIN ASPART 16 UNITS: 100 INJECTION, SOLUTION INTRAVENOUS; SUBCUTANEOUS at 11:48

## 2021-04-05 RX ADMIN — METOPROLOL TARTRATE 100 MG: 50 TABLET, FILM COATED ORAL at 09:35

## 2021-04-05 RX ADMIN — INSULIN ASPART 8 UNITS: 100 INJECTION, SOLUTION INTRAVENOUS; SUBCUTANEOUS at 17:22

## 2021-04-05 RX ADMIN — INSULIN ASPART 4 UNITS: 100 INJECTION, SOLUTION INTRAVENOUS; SUBCUTANEOUS at 11:49

## 2021-04-05 RX ADMIN — CLINDAMYCIN PHOSPHATE 300 MG: 300 INJECTION, SOLUTION INTRAVENOUS at 01:28

## 2021-04-05 RX ADMIN — SODIUM BICARBONATE 1300 MG: 650 TABLET ORAL at 20:45

## 2021-04-05 RX ADMIN — NYSTATIN 3 APPLICATION: 100000 POWDER TOPICAL at 09:39

## 2021-04-05 NOTE — CONSULTS
Adult Nutrition  Assessment    Patient Name:  Yamileth Slater  YOB: 1960  MRN: [id number]  Admit Date:  3/31/2021    Assessment Date:  4/5/2021    Comments:  Pt taking Po well.  No GI distress.  RD answered questions regarding diet.  Encouraged SMBS.  Continued education on Diabetes and Healthy eating.  Glucose still elevated--Levemir increased.  Still being managed for Diabetes;  MRSA; Pneumonia and Cutaneous Candidiasis.  Emphasized the need for good blood sugar control for overall good health.  RD will continue to monitor      Reason for Assessment     Row Name 04/05/21 1558          Reason for Assessment    Reason For Assessment  follow-up protocol         Nutrition/Diet History     Row Name 04/05/21 1554          Nutrition/Diet History    Typical Food/Fluid Intake  Pt claims that she is eating ok.  Still does not feel well.  She has a questions about meats.           Labs/Tests/Procedures/Meds     Row Name 04/05/21 1554          Labs/Procedures/Meds    Lab Results Reviewed  reviewed, pertinent     Lab Results Comments  Gluc 273/273/368/338/304; Na+ 135; creat 1.88; Alb 2.30        Diagnostic Tests/Procedures    Diagnostic Test/Procedure Reviewed  reviewed, pertinent     Diagnostic Test/Procedures Comments  PT; supplemental 02        Medications    Pertinent Medications Reviewed  reviewed, pertinent     Pertinent Medications Comments  Abx; SSI; Novolog with meals; Levemir--increased Levermir to 30 units bid         Physical Findings     Row Name 04/05/21 1552          Physical Findings    Overall Physical Appearance  on oxygen therapy;obese           Nutrition Prescription Ordered     Row Name 04/05/21 1554          Nutrition Prescription PO    Current PO Diet  Regular     Fluid Consistency  Thin     Common Modifiers  Cardiac;Consistent Carbohydrate         Evaluation of Received Nutrient/Fluid Intake     Row Name 04/05/21 1552          PO Evaluation    Number of Meals  5     % PO Intake   75% average               Electronically signed by:  Gissel Littlejohn RD  04/05/21 16:00 CDT

## 2021-04-05 NOTE — THERAPY TREATMENT NOTE
Acute Care - Physical Therapy Treatment Note  [facility]     Patient Name: Yamileth Slater  : 1960  MRN: [id number]  Today's Date: 2021           PT Assessment (last 12 hours)      PT Evaluation and Treatment     Row Name 21 1455          Physical Therapy Time and Intention    Subjective Information  no complaints  -LN     Document Type  therapy note (daily note)  -LN     Mode of Treatment  physical therapy;individual therapy  -LN     Row Name 21 1455          General Information    Patient Profile Reviewed  yes  -LN     Existing Precautions/Restrictions  fall R wrist precautions from possible fx hx per chart  -LN     Row Name 21 1455          Cognition    Orientation Status (Cognition)  oriented to;person;place;time;situation able to answer but slow in response time and detail  -     Row Name 21 1455          Pain Scale: Numbers Pre/Post-Treatment    Pretreatment Pain Rating  0/10 - no pain  -LN     Posttreatment Pain Rating  0/10 - no pain  -LN     Row Name 21 1455          Bed Mobility    Bed Mobility  bed mobility (all) activities  -LN     All Activities, [place] (Bed Mobility)  minimum assist (75% patient effort);moderate assist (50% patient effort)  -LN     Rolling Right [place] (Bed Mobility)  not tested  -LN     Scooting/Bridging [place] (Bed Mobility)  minimum assist (75% patient effort)  -LN     Supine-Sit [place] (Bed Mobility)  moderate assist (50% patient effort);1 person assist;minimum assist (75% patient effort)  -LN     Sit-Supine [place] (Bed Mobility)  not tested  -LN     Row Name 21 1455          Transfers    Bed-Chair [place] (Transfers)  minimum assist (75% patient effort);1 person assist  -LN     Assistive Device (Bed-Chair Transfers)  walker, front-wheeled  -LN     Sit-Stand [place] (Transfers)  minimum assist (75% patient effort);1 person assist  -LN     Stand-Sit [place] (Transfers)  minimum  assist (75% patient effort);1 person assist;verbal cues  -LN     [place] Level (Toilet Transfer)  minimum assist (75% patient effort)  -LN     Assistive Device (Toilet Transfer)  commode, bedside with drop arms;grab bars/safety frame;walker, front-wheeled  -LN     Row Name 04/05/21 1455          Sit-Stand Transfer    Assistive Device (Sit-Stand Transfers)  walker, front-wheeled  -LN     Row Name 04/05/21 1455          Stand-Sit Transfer    Assistive Device (Stand-Sit Transfers)  walker, front-wheeled  -LN     Row Name 04/05/21 1455          Toilet Transfer    Type (Toilet Transfer)  sit-stand;stand-sit  -LN     Row Name 04/05/21 1455          Gait/Stairs (Locomotion)    [place] Level (Gait)  minimum assist (75% patient effort);1 person assist  -LN     Assistive Device (Gait)  walker, front-wheeled  -LN     Distance in Feet (Gait)  5,6  -LN     Deviations/Abnormal Patterns (Gait)  stride length decreased;gait speed decreased  -     Row Name 04/05/21 1455          Safety Issues, Functional Mobility    Impairments Affecting Function (Mobility)  cognition;balance;coordination;endurance/activity tolerance;sensation/sensory awareness;strength  -     Row Name 04/05/21 1455          Hip (Therapeutic Exercise)    Hip (Therapeutic Exercise)  AROM (active range of motion)  -LN     Hip AROM (Therapeutic Exercise)  bilateral;flexion;aBduction;aDduction  -     Row Name 04/05/21 1455          Knee (Therapeutic Exercise)    Knee (Therapeutic Exercise)  AROM (active range of motion)  -LN     Knee AROM (Therapeutic Exercise)  bilateral;LAQ (long arc quad)  -     Row Name 04/05/21 1455          Ankle (Therapeutic Exercise)    Ankle (Therapeutic Exercise)  AROM (active range of motion)  -LN     Ankle AROM (Therapeutic Exercise)  bilateral;dorsiflexion;plantarflexion  -     Row Name 04/05/21 1455          Plan of Care Review    Plan of Care Reviewed With  patient  -LN     Outcome Summary  sup-sit min/mod of  1,sit-stand-sit min of 1 and vc's,amb 5',6' with rw and min of 1  -LN     Row Name 04/05/21 1455          Vital Signs    Pre Patient Position  Supine  -LN     Intra Patient Position  Standing  -LN     Post Patient Position  Sitting  -LN     Row Name 04/05/21 1455          Bed Mobility Goal 1 (PT)    Activity/Assistive Device (Bed Mobility Goal 1, PT)  bed mobility activities, all  -LN     [place] Level/Cues Needed (Bed Mobility Goal 1, PT)  moderate assist (50-74% patient effort);minimum assist (75% or more patient effort);1 person assist  -LN     Time Frame (Bed Mobility Goal 1, PT)  1 week  -LN     Progress/Outcomes (Bed Mobility Goal 1, PT)  goal partially met;continuing progress toward goal  -LN     Row Name 04/05/21 1455          Transfer Goal 1 (PT)    Activity/Assistive Device (Transfer Goal 1, PT)  bed-to-chair/chair-to-bed  -LN     [place] Level/Cues Needed (Transfer Goal 1, PT)  minimum assist (75% or more patient effort);moderate assist (50-74% patient effort);2 person assist  -LN     Progress/Outcome (Transfer Goal 1, PT)  continuing progress toward goal;goal met  -LN     Row Name 04/05/21 1455          Gait Training Goal 1 (PT)    Activity/Assistive Device (Gait Training Goal 1, PT)  gait (walking locomotion);assistive device use  -LN     [place] Level (Gait Training Goal 1, PT)  moderate assist (50-74% patient effort);minimum assist (75% or more patient effort);2 person assist  -LN     Distance (Gait Training Goal 1, PT)  100 ft or more  -LN     Time Frame (Gait Training Goal 1, PT)  2 weeks  -LN     Progress/Outcome (Gait Training Goal 1, PT)  continuing progress toward goal;goal partially met  -LN     Row Name 04/05/21 1455          ROM Goal 1 (PT)    ROM Goal 1 (PT)  NOEL SARAH activley for hip, knee and ankle flx ex 20 reps x 2  -LN     Time Frame (ROM Goal 1, PT)  4 days  -LN     Progress/Outcome (ROM Goal 1, PT)  goal not met  -LN     Row Name 04/05/21 1455          Stairs Goal 1  (PT)    Activity/Assistive Device (Stairs Goal 1, PT)  ascending stairs;descending stairs  -LN     [place] Level/Cues Needed (Stairs Goal 1, PT)  modified independence  -LN     Number of Stairs (Stairs Goal 1, PT)  2 or more as lopez;  -LN     Time Frame (Stairs Goal 1, PT)  long term goal (LTG);3 weeks  -LN     Progress/Outcome (Stairs Goal 1, PT)  goal not met  -LN     Row Name 04/05/21 1455          Positioning and Restraints    Post Treatment Position  chair spouse verbalized not to leave pt alone  -LN     In Chair  reclined;encouraged to call for assist;with family/caregiver;legs elevated spouse verbalized understanding to call for assist  -LN       User Key  (r) = Recorded By, (t) = Taken By, (c) = Cosigned By    Initials Name Provider Type    LN Lor Tavarez PTA Physical Therapy Assistant        Physical Therapy Education                 Title: PT OT SLP Therapies (In Progress)     Topic: Physical Therapy (In Progress)     Point: Mobility training (In Progress)     Learning Progress Summary           Patient Acceptance, D,E, NR by JUANCARLOS at 4/1/2021 1056    Comment: POC: instructions for eval/ex to encourage active  mobility                   Point: Home exercise program (Not Started)     Learner Progress:  Not documented in this visit.          Point: Body mechanics (Not Started)     Learner Progress:  Not documented in this visit.          Point: Precautions (Not Started)     Learner Progress:  Not documented in this visit.                      User Key     Initials Effective Dates Name Provider Type Discipline     04/03/18 -  Gauri Anderson, PT Physical Therapist PT              PT Recommendation and Plan  Therapy Frequency (PT):  (5-7 d/w)  Plan of Care Reviewed With: patient  Outcome Summary: sup-sit min/mod of 1,sit-stand-sit min of 1 and vc's,amb 5',6' with rw and min of 1       Time Calculation:   PT Charges     Row Name 04/05/21 1540             Time Calculation    Start Time  1455  -LN       Stop Time  1520  -LN      Time Calculation (min)  25 min  -LN      PT Received On  04/05/21  -LN         Time Calculation- PT    Total Timed Code Minutes- PT  25 minute(s)  -LN        User Key  (r) = Recorded By, (t) = Taken By, (c) = Cosigned By    Initials Name Provider Type    LN Lor Tavarez PTA Physical Therapy Assistant        Therapy Charges for Today     Code Description Service Date Service Provider Modifiers Qty    72934548437 HC GAIT TRAINING EA 15 MIN 4/5/2021 Lor Tavarez PTA GP 1    76358171935 HC PT THER PROC EA 15 MIN 4/5/2021 Lor Tavarez PTA GP 1          PT G-Codes  Outcome Measure Options: AM-PAC 6 Clicks Daily Activity (OT)  AM-PAC 6 Clicks Score (PT): 14  AM-PAC 6 Clicks Score (OT): 15    Lor Tavarez PTA  4/5/2021

## 2021-04-05 NOTE — PLAN OF CARE
Goal Outcome Evaluation:  Plan of Care Reviewed With: patient     Outcome Summary: OT Treatment completed on this date. Pt daughter present upona rrival to room. Pt complaining of low back and buttocks pain. Bed Mobility: Min A for Sup<>Sit and Mod I for Sit<>Sup Transfers; CGA with use of RW. Pt completed oral hygiene, combing hair, and washing face while sitting sink side with supervision with set-up. Pt mentation much improved on this date. Pt completed BUE ther ex while sitting EOB - 1 x 20 with 3 lb dowel ericka bilaterally to improve BUE strength/endurance needed for the completion of functional activities. Pt making progress during hospital stay - if pt is able to continue to demonstrate progress during hospital stay, pt may benefit from d/c home with HHOT. Will monitor closesly.

## 2021-04-05 NOTE — PLAN OF CARE
Goal Outcome Evaluation:  Plan of Care Reviewed With: patient     Outcome Summary: Plans to d/c home in few days. Need to get BS better under control - see MD order for change in [place]. Pt working better with PT/OT.

## 2021-04-05 NOTE — PLAN OF CARE
Goal Outcome Evaluation:  Plan of Care Reviewed With: patient     Outcome Summary: sup-sit min/mod of 1,sit-stand-sit min of 1 and vc's,amb 5',6' with rw and min of 1

## 2021-04-05 NOTE — PLAN OF CARE
Problem: Adult Inpatient Plan of Care  Goal: Plan of Care Review  Outcome: Ongoing, Progressing  Flowsheets  Taken 4/5/2021 0319 by Charlette Miranda RN  Progress: improving  Taken 4/4/2021 1310 by Nissa Bey COTA/L  Plan of Care Reviewed With: patient  Goal: Patient-Specific Goal (Individualized)  Outcome: Ongoing, Progressing  Goal: Absence of Hospital-Acquired Illness or Injury  Outcome: Ongoing, Progressing  Intervention: Identify and Manage Fall Risk  Recent Flowsheet Documentation  Taken 4/5/2021 0200 by Charlette Miranda RN  Safety Promotion/Fall Prevention: safety round/check completed  Taken 4/5/2021 0000 by Charlette Miranda RN  Safety Promotion/Fall Prevention: safety round/check completed  Taken 4/4/2021 2204 by Charlette Miranda RN  Safety Promotion/Fall Prevention: safety round/check completed  Taken 4/4/2021 2120 by Charlette Miranda RN  Safety Promotion/Fall Prevention: safety round/check completed  Taken 4/4/2021 2020 by Charlette Miranda RN  Safety Promotion/Fall Prevention: safety round/check completed  Taken 4/4/2021 1926 by Charlette Miranda RN  Safety Promotion/Fall Prevention: safety round/check completed  Intervention: Prevent Skin Injury  Recent Flowsheet Documentation  Taken 4/5/2021 0200 by Charlette Miranda RN  Body Position: position changed independently  Taken 4/5/2021 0000 by Charlette Miranda RN  Body Position: position changed independently  Taken 4/4/2021 2204 by Charlette Miranda RN  Body Position: position changed independently  Taken 4/4/2021 2120 by Charlette Miranda RN  Body Position: position changed independently  Taken 4/4/2021 2020 by Charlette Miranda RN  Body Position: position changed independently  Taken 4/4/2021 1926 by Charlette Miranda RN  Body Position: position changed independently  Skin Protection: incontinence pads utilized  Intervention: Prevent and Manage VTE (venous thromboembolism) Risk  Recent Flowsheet Documentation  Taken  4/4/2021 1926 by Charlette Miranda RN  VTE Prevention/Management: (heparin) other (see comments)  Intervention: Prevent Infection  Recent Flowsheet Documentation  Taken 4/4/2021 1926 by Charlette Miranda RN  Infection Prevention: single patient room provided  Goal: Optimal Comfort and Wellbeing  Outcome: Ongoing, Progressing  Intervention: Provide Person-Centered Care  Recent Flowsheet Documentation  Taken 4/4/2021 1926 by Charlette Miranda RN  Trust Relationship/Rapport: care explained  Goal: Readiness for Transition of Care  Outcome: Ongoing, Progressing   Goal Outcome Evaluation:     Progress: improving

## 2021-04-05 NOTE — PROGRESS NOTES
"[facility]  [address] [place] [address]. [id number]  T - [phone]  F - [phone]     Progress Note          PATIENT  DEMOGRAPHICS   PATIENT NAME: Yamileth Slater                      PHYSICIAN: JmBRIDGETT Watson  : 1960  MRN: [id number]   LOS: 4 days    Patient Care Team:  Nirmal Calvillo MD as PCP - General (Family Medicine)  Subjective   SUBJECTIVE   No acute events overnight.        Objective   OBJECTIVE   Vital Signs  Temp:  [96.4 °F (35.8 °C)-97.7 °F (36.5 °C)] 97.7 °F (36.5 °C)  Heart Rate:  [73-86] 73  Resp:  [18-20] 18  BP: (122-152)/(63-78) 122/70    Flowsheet Rows      First Filed Value   Admission Height  157.5 cm (62\") Documented at 2021 1136   Admission Weight  125 kg (275 lb) Documented at 2021 1136           I/O last 3 completed shifts:  In: 760 [P.O.:760]  Out: 6900 [Urine:6900]    PHYSICAL EXAM    Physical Exam  Vitals and nursing note reviewed.   Constitutional:       Appearance: She is obese.   HENT:      Head: Normocephalic and atraumatic.   Cardiovascular:      Rate and Rhythm: Normal rate and regular rhythm.   Pulmonary:      Effort: Pulmonary effort is normal.      Breath sounds: Normal breath sounds.   Abdominal:      General: There is distension.   Musculoskeletal:      Right lower leg: Edema present.      Left lower leg: Edema present.   Skin:     General: Skin is warm and dry.      Coloration: Skin is pale.   Neurological:      Mental Status: She is alert. Mental status is at baseline.   Psychiatric:      Comments: irritable         RESULTS   Results Review:    Results from last 7 days   Lab Units 21  0537 21  0623 21  0321   SODIUM mmol/L 135* 138 136   POTASSIUM mmol/L 3.9 4.1 4.4   CHLORIDE mmol/L 105 108* 109*   CO2 mmol/L 19.0* 18.0* 20.0*   BUN mg/dL 22 21 29*   CREATININE mg/dL 1.88* 1.88* 2.53*   CALCIUM mg/dL 8.8 9.4 8.5*   BILIRUBIN mg/dL 0.2 0.3 <0.2   ALK PHOS U/L 178* 185* 154*   ALT (SGPT) U/L " 11 12 11   AST (SGOT) U/L 13 11 14   GLUCOSE mg/dL 334* 283* 253*       Estimated Creatinine Clearance: 38.8 mL/min (A) (by C-G formula based on SCr of 1.88 mg/dL (H)).    Results from last 7 days   Lab Units 04/01/21  0345 03/31/21  2350 03/31/21  1949   MAGNESIUM mg/dL 1.8 2.0 1.9   PHOSPHORUS mg/dL 3.8 3.4 3.6             Results from last 7 days   Lab Units 04/05/21  0537 04/04/21  0623 04/03/21  0321 04/02/21  0918 04/01/21  0944   WBC 10*3/mm3 8.05 8.17 8.36 8.79 12.29*   HEMOGLOBIN g/dL 9.3* 10.0* 8.5* 8.9* 9.6*   PLATELETS 10*3/mm3 294 310 263 240 248       Results from last 7 days   Lab Units 03/31/21  1258   INR  1.01         Imaging Results (Last 24 Hours)     Procedure Component Value Units Date/Time    XR Sacrum & Coccyx [[id number]] Collected: 04/05/21 1000     Updated: 04/05/21 1108    Narrative:      Procedure: Sacrum and coccyx    Reason for exam: Pain following fall.    FINDINGS: Comparison exam dated March 31, 2021. Sacrum and coccyx  are normal. There is no evidence of fracture or dislocation. Soft  tissue structures unremarkable. Atherosclerotic vascular  calcifications in region of the pelvis.      Impression:      No acute sacrum or coccyx abnormality.    Electronically signed by:  Young Johnson MD  4/5/2021 11:06 AM CDT  Workstation: [id number]    XR Chest PA & Lateral [[id number]] Collected: 04/05/21 0713     Updated: 04/05/21 0729    Narrative:          PROCEDURE: Chest PA and lateral    REASON FOR EXAM: pulmonary aspitration pneumonia, R73.9  Hyperglycemia, unspecified N39.0 Urinary tract infection, site  not specified Z74.09 Other reduced mobility Z78.9 Other specified  health status Z74.09 Other reduced mobility    FINDINGS: Comparison study dated March 31, 2021. Stable  postsurgical changes with median sternotomy. Cardiomegaly.  Pulmonary vasculature appears within normal limits. Lungs are  clear. Pleural spaces are normal . No acute osseous abnormality.      Impression:      1.   Cardiomegaly without decompensation.  2.  Otherwise unremarkable chest.    Electronically signed by:  Young Johnson MD  4/5/2021 7:28 AM CDT  Workstation: [id number]           MEDICATIONS    atorvastatin, 40 mg, Oral, Daily  clindamycin, 600 mg, Oral, Q8H  clopidogrel, 75 mg, Oral, Daily  gabapentin, 100 mg, Oral, Q8H  heparin (porcine), 5,000 Units, Subcutaneous, Q8H  insulin aspart, 0-24 Units, Subcutaneous, TID AC  insulin aspart, 4 Units, Subcutaneous, TID With Meals  insulin detemir, 30 Units, Subcutaneous, Q12H  ipratropium-albuterol, 3 mL, Nebulization, 4x Daily - RT  isosorbide mononitrate, 30 mg, Oral, Daily  metoprolol tartrate, 100 mg, Oral, Q12H  montelukast, 10 mg, Oral, Nightly  nystatin, , Topical, TID  oxybutynin XL, 5 mg, Oral, Daily  pantoprazole, 40 mg, Oral, Nightly  potassium chloride, 10 mEq, Oral, Daily  sodium bicarbonate, 1,300 mg, Oral, BID  sodium chloride, 10 mL, Intravenous, Q12H  sodium chloride, 10 mL, Intravenous, Q12H  sodium chloride, 10 mL, Intravenous, Q12H           Assessment/Plan   ASSESSMENT / PLAN      Type 2 diabetes mellitus with hyperosmolar hyperglycemic state (HHS) (CMS/[facility])    GERD (gastroesophageal reflux disease)    Coronary artery disease    Chronic obstructive lung disease (CMS/[facility])    Acute renal failure superimposed on chronic kidney disease (CMS/[facility])    Current smoker    Metabolic acidosis    Hyponatremia    Hyperkalemia    Hypocalcemia    Anemia    Acute cystitis    Cutaneous candidiasis    Community acquired pneumonia of right middle lobe of lung    MRSA infection    1. Acute Kidney Failure on CKD 4: Baseline creatinine 1.8-2.0 mg/dl  - Developed ERIKA in the setting of DKA.   - UA showed 2+ protein, 1+ blood, 21-30 RBCs, numerous WBCs. Hansels stain is negative.  Urine sodium 115, creatinine 14.9, protein 81.8  - Renal US no hydro  - Creatinine is 1.88mg/dl- which is her baseline. Off IVF  - Maintain hemodynamics. Monitor I&Os. Avoid nephrotoxins like NSAIDs and  IV contrast    Keep na bicarb.      2. Hypertension:   - Blood pressure is controlled     3. Anemia:  - Hemoglobin is stable. Finished IV iron     4. DKA: resolved     5. Diabetes type 2     6. Diastolic heart failure     7. Sleep apnea     8. Obesity     9. Metabolic Acidosis- on sodium bicarb 1300mg po bid            This document has been electronically signed by BRIDGETT Hadley on April 5, 2021 13:25 CDT      For this patient encounter, I have reviewed the Nurse Practitioner's documentation, medical decision making, and treatment plan and personally spent time with the patient. Pt is seen on April 5th at 1130 am

## 2021-04-05 NOTE — PLAN OF CARE
Goal Outcome Evaluation:  Plan of Care Reviewed With: caregiver  Progress: improving  Outcome Summary: Still taking po well.  Education continued on Diabetes and healthy eating.

## 2021-04-05 NOTE — PROGRESS NOTES
FAMILY MEDICINE DAILY PROGRESS NOTE  NAME: Yamileth Slater  : 1960  MRN: [id number]     LOS: 4 days     PROVIDER OF SERVICE: Nirmal Calvillo MD    Chief Complaint: Type 2 diabetes mellitus with hyperosmolar hyperglycemic state (HHS) (CMS/[facility])    Subjective:     Interval History:  History taken from: patient chart family  VSS no acute events overnight. Complaining of coccyx pain after fall several days ago. CXR unremarkable this morning. Hyponatremia with otherwise improving labs. Switching to oral Clindamycin 600mg q8h, and increased Levemir to 30 units BID. PT recommending rehab to home.    Review of Systems:   Review of Systems   Constitutional: Positive for fatigue. Negative for activity change and appetite change.   HENT: Negative for congestion and trouble swallowing.    Respiratory: Negative for chest tightness and shortness of breath.    Cardiovascular: Negative for chest pain and palpitations.   Gastrointestinal: Negative for abdominal distention and abdominal pain.   Genitourinary: Negative for difficulty urinating and dysuria.   Musculoskeletal: Positive for arthralgias (Coccyx). Negative for myalgias.   Skin: Negative for color change and pallor.   Neurological: Positive for weakness. Negative for dizziness, light-headedness and headaches.   Psychiatric/Behavioral: Negative for agitation and behavioral problems.       Objective:     Vital Signs  Temp:  [96.4 °F (35.8 °C)-97.6 °F (36.4 °C)] 97.3 °F (36.3 °C)  Heart Rate:  [74-89] 74  Resp:  [18-20] 18  BP: (142-152)/(63-78) 152/78    Physical Exam  Physical Exam  Constitutional:       General: She is not in acute distress.     Appearance: She is well-developed.   HENT:      Head: Normocephalic and atraumatic.      Right Ear: External ear normal.      Left Ear: External ear normal.      Nose: Nose normal.   Eyes:      Extraocular Movements: Extraocular movements intact.      Pupils: Pupils are equal, round, and reactive to light.    Cardiovascular:      Rate and Rhythm: Normal rate and regular rhythm.      Heart sounds: Normal heart sounds. No murmur heard.   No friction rub. No gallop.    Pulmonary:      Effort: Pulmonary effort is normal. No respiratory distress.      Breath sounds: Normal breath sounds. No wheezing or rales.   Abdominal:      General: Bowel sounds are normal. There is no distension.      Palpations: Abdomen is soft.      Tenderness: There is no abdominal tenderness.   Musculoskeletal:         General: Normal range of motion.      Cervical back: Normal range of motion and neck supple.      Right lower leg: No edema.      Left lower leg: No edema.   Skin:     General: Skin is warm.      Findings: No erythema.   Neurological:      Mental Status: She is alert and oriented to person, place, and time. Mental status is at baseline.   Psychiatric:         Mood and Affect: Mood normal.         Behavior: Behavior normal.         Medication Review    Current Facility-Administered Medications:   •  acetaminophen (TYLENOL) tablet 650 mg, 650 mg, Oral, Q6H PRN, Huy Santa MD, 650 mg at 04/05/21 0004  •  albuterol (PROVENTIL) nebulizer solution 0.083% 2.5 mg/3mL, 2.5 mg, Nebulization, Q4H PRN, Huy Santa MD  •  atorvastatin (LIPITOR) tablet 40 mg, 40 mg, Oral, Daily, Huy Santa MD, 40 mg at 04/04/21 1019  •  clindamycin (CLEOCIN) capsule 600 mg, 600 mg, Oral, Q8H, Nirmal Calvillo MD  •  clopidogrel (PLAVIX) tablet 75 mg, 75 mg, Oral, Daily, Huy Santa MD, 75 mg at 04/04/21 1018  •  cyclobenzaprine (FLEXERIL) tablet 10 mg, 10 mg, Oral, BID PRN, Huy Santa MD, 10 mg at 04/05/21 0003  •  dextrose (D50W) 25 g/ 50mL Intravenous Solution 25 g, 25 g, Intravenous, Q15 Min PRN, Huy Santa MD  •  dextrose (GLUTOSE) oral gel 15 g, 15 g, Oral, Q15 Min PRN, Huy Santa MD  •  ferric gluconate (FERRLECIT) 250 mg in sodium chloride 0.9 % 100 mL IVPB, 250 mg, Intravenous, Daily, Huy Santa MD, Last Rate: 60  mL/hr at 04/04/21 1232, 250 mg at 04/04/21 1232  •  gabapentin (NEURONTIN) capsule 100 mg, 100 mg, Oral, Q8H, Huy Santa MD, 100 mg at 04/05/21 0629  •  glucagon (human recombinant) (GLUCAGEN DIAGNOSTIC) injection 1 mg, 1 mg, Subcutaneous, Q15 Min PRN, Huy Santa MD  •  heparin (porcine) 5000 UNIT/ML injection 5,000 Units, 5,000 Units, Subcutaneous, Q8H, Huy Santa MD, 5,000 Units at 04/05/21 0628  •  insulin aspart (novoLOG) injection 0-24 Units, 0-24 Units, Subcutaneous, TID AC, Haily Mcneil MD, 16 Units at 04/04/21 1732  •  insulin aspart (novoLOG) injection 4 Units, 4 Units, Subcutaneous, TID With Meals, Umesh Giraldo III, MD, 4 Units at 04/04/21 1732  •  insulin detemir (LEVEMIR) injection 30 Units, 30 Units, Subcutaneous, Q12H, Nirmal Calvillo MD  •  ipratropium-albuterol (DUO-NEB) nebulizer solution 3 mL, 3 mL, Nebulization, 4x Daily - RT, Huy Santa MD, 3 mL at 04/04/21 1949  •  isosorbide mononitrate (IMDUR) 24 hr tablet 30 mg, 30 mg, Oral, Daily, Huy Santa MD, 30 mg at 04/04/21 1018  •  metoprolol tartrate (LOPRESSOR) tablet 100 mg, 100 mg, Oral, Q12H, Huy Santa MD, 100 mg at 04/04/21 2040  •  montelukast (SINGULAIR) tablet 10 mg, 10 mg, Oral, Nightly, Huy Santa MD, 10 mg at 04/04/21 2040  •  nitroglycerin (NITROSTAT) SL tablet 0.4 mg, 0.4 mg, Sublingual, PRN, Huy Santa MD  •  nystatin (MYCOSTATIN) powder, , Topical, TID, Huy Santa MD, Given at 04/04/21 1522  •  ondansetron ODT (ZOFRAN-ODT) disintegrating tablet 4 mg, 4 mg, Oral, Q8H PRN, Huy Santa MD  •  oxybutynin XL (DITROPAN-XL) 24 hr tablet 5 mg, 5 mg, Oral, Daily, Huy Santa MD, 5 mg at 04/04/21 1016  •  pantoprazole (PROTONIX) EC tablet 40 mg, 40 mg, Oral, Nightly, Huy Santa MD, 40 mg at 04/04/21 2040  •  potassium chloride (MICRO-K) CR capsule 10 mEq, 10 mEq, Oral, Daily, Huy Santa MD, 10 mEq at 04/04/21 1016  •  promethazine (PHENERGAN) tablet 25 mg, 25 mg, Oral,  Q6H Kiet PARKS James, MD  •  sodium bicarbonate tablet 1,300 mg, 1,300 mg, Oral, BID, Janice Chavez, APRN, 1,300 mg at 04/04/21 2040  •  sodium chloride 0.9 % flush 10 mL, 10 mL, Intravenous, PRKiet LAYTON James, MD  •  [COMPLETED] Insert peripheral IV, , , Once **AND** sodium chloride 0.9 % flush 10 mL, 10 mL, Intravenous, PRN, Huy Santa MD  •  sodium chloride 0.9 % flush 10 mL, 10 mL, Intravenous, Q12H, Huy Santa MD, 10 mL at 04/04/21 1021  •  sodium chloride 0.9 % flush 10 mL, 10 mL, Intravenous, PRN, Huy Santa MD  •  sodium chloride 0.9 % flush 10 mL, 10 mL, Intravenous, Q12H, Huy Santa MD, 10 mL at 04/04/21 1020  •  sodium chloride 0.9 % flush 10 mL, 10 mL, Intravenous, Q12H, Huy Santa MD, 10 mL at 04/04/21 1020  •  sodium chloride 0.9 % flush 10 mL, 10 mL, Intravenous, PRKINJAL, Huy Santa MD     Diagnostic Data    Lab Results (last 24 hours)     Procedure Component Value Units Date/Time    Manual Differential [[id number]]  (Abnormal) Collected: 04/05/21 0537    Specimen: Blood Updated: 04/05/21 0719     Neutrophil % 53.0 %      Lymphocyte % 32.0 %      Monocyte % 11.0 %      Eosinophil % 1.0 %      Bands %  1.0 %      Metamyelocyte % 2.0 %      Neutrophils Absolute 4.35 10*3/mm3      Lymphocytes Absolute 2.58 10*3/mm3      Monocytes Absolute 0.89 10*3/mm3      Eosinophils Absolute 0.08 10*3/mm3      Acanthocytes Slight/1+     Polychromasia Slight/1+     WBC Morphology Normal     Platelet Estimate Adequate    Comprehensive Metabolic Panel [[id number]]  (Abnormal) Collected: 04/05/21 0537    Specimen: Blood Updated: 04/05/21 0706     Glucose 334 mg/dL      BUN 22 mg/dL      Creatinine 1.88 mg/dL      Sodium 135 mmol/L      Potassium 3.9 mmol/L      Comment: Slight hemolysis detected by analyzer. Results may be affected.        Chloride 105 mmol/L      CO2 19.0 mmol/L      Calcium 8.8 mg/dL      Total Protein 6.4 g/dL      Albumin 2.30 g/dL      ALT (SGPT) 11 U/L      AST (SGOT) 13  U/L      Alkaline Phosphatase 178 U/L      Total Bilirubin 0.2 mg/dL      eGFR Non African Amer 27 mL/min/1.73      Globulin 4.1 gm/dL      A/G Ratio 0.6 g/dL      BUN/Creatinine Ratio 11.7     Anion Gap 11.0 mmol/L     Narrative:      GFR Normal >60  Chronic Kidney Disease <60  Kidney Failure <15      CBC & Differential [[id number]]  (Abnormal) Collected: 04/05/21 0537    Specimen: Blood Updated: 04/05/21 0654    Narrative:      The following orders were created for panel order CBC & Differential.  Procedure                               Abnormality         Status                     ---------                               -----------         ------                     Scan Slide[[id number]]                                                                  CBC Auto Differential[[id number]]        Abnormal            Final result                 Please view results for these tests on the individual orders.    POC Glucose Once [[id number]]  (Abnormal) Collected: 04/05/21 0620    Specimen: Blood Updated: 04/05/21 0643     Glucose 338 mg/dL      Comment: RN NotifiedOperator: [id number] DARIEN Cruz ID: [id number]       CBC Auto Differential [[id number]]  (Abnormal) Collected: 04/05/21 0537    Specimen: Blood Updated: 04/05/21 0640     WBC 8.05 10*3/mm3      RBC 3.30 10*6/mm3      Hemoglobin 9.3 g/dL      Hematocrit 28.2 %      MCV 85.5 fL      MCH 28.2 pg      MCHC 33.0 g/dL      RDW 15.0 %      RDW-SD 46.5 fl      MPV 9.4 fL      Platelets 294 10*3/mm3      Neutrophil % 55.0 %      Lymphocyte % 26.3 %      Monocyte % 9.4 %      Eosinophil % 3.0 %      Basophil % 1.1 %      Immature Grans % 5.2 %      Neutrophils, Absolute 4.42 10*3/mm3      Lymphocytes, Absolute 2.12 10*3/mm3      Monocytes, Absolute 0.76 10*3/mm3      Eosinophils, Absolute 0.24 10*3/mm3      Basophils, Absolute 0.09 10*3/mm3      Immature Grans, Absolute 0.42 10*3/mm3      nRBC 0.6 /100 WBC     POC Glucose Once [[id number]]  (Abnormal) Collected:  04/04/21 1952    Specimen: Blood Updated: 04/04/21 2022     Glucose 368 mg/dL      Comment: RN NotifiedOperator: [id number] DARIEN Cruz ID: [id number]       POC Glucose Once [[id number]]  (Abnormal) Collected: 04/04/21 1640    Specimen: Blood Updated: 04/04/21 1657     Glucose 323 mg/dL      Comment: RN NotifiedOperator: [id number] KIKE Russell ID: [id number]              Imaging Results (Last 24 Hours)     Procedure Component Value Units Date/Time    XR Chest PA & Lateral [[id number]] Collected: 04/05/21 0713     Updated: 04/05/21 0729    Narrative:          PROCEDURE: Chest PA and lateral    REASON FOR EXAM: pulmonary aspitration pneumonia, R73.9  Hyperglycemia, unspecified N39.0 Urinary tract infection, site  not specified Z74.09 Other reduced mobility Z78.9 Other specified  health status Z74.09 Other reduced mobility    FINDINGS: Comparison study dated March 31, 2021. Stable  postsurgical changes with median sternotomy. Cardiomegaly.  Pulmonary vasculature appears within normal limits. Lungs are  clear. Pleural spaces are normal . No acute osseous abnormality.      Impression:      1.  Cardiomegaly without decompensation.  2.  Otherwise unremarkable chest.    Electronically signed by:  Young Johnson MD  4/5/2021 7:28 AM CDT  Workstation: [id number]          I reviewed the patient's new clinical results.    Assessment/Plan:     Active Hospital Problems    Diagnosis    • **Type 2 diabetes mellitus with hyperosmolar hyperglycemic state (HHS) (CMS/[facility])      -IV fluids  - Glucose monitoring  -Mod/High dose SSI  -Levemir 30u bid, Aspart 4 units w/meals     • MRSA infection    • Cutaneous candidiasis      - Nystatin     • Community acquired pneumonia of right middle lobe of lung      - Confirmed on CXR. CXR 4/5 unremarkable   -Aspiration pneumonitis now suspected.  Patient MRSA positive in nares.  -IV clindamycin 300 mg 3 times daily finish out 10-day course of antibiotic treatment. Started 4/3/21.  Switched to Clindamycin 600mg q8h 4/5/21     • Metabolic acidosis      -Underlying HHS  -No anion gap     • Hyponatremia      -IVF     • Hyperkalemia      -We will monitor and replace         • Hypocalcemia      - Will continue to monitor     • Anemia      -Iron studies indicative of mixed iron deficiency and chronic disease  -Continue home ferrous sulfate       • Acute cystitis      -IV Rocephin 3-day course  -Follow urine culture     • Acute renal failure superimposed on chronic kidney disease (CMS/HCC)        -Continue IV fluids but increase to 125 mils per hour  -Hold nephrotoxic drugs  -Nephrology consulted and will see patient.  -Creatinine back to baseline.     • Chronic obstructive lung disease (CMS/HCC)      -O2 supplementation  -Home inhalers as needed  -DuoNebs  -CPAP at night     • GERD (gastroesophageal reflux disease)      - IV protonix qday     • Coronary artery disease      -Continue home meds     • Current smoker      -Patch           DVT prophylaxis: Heparin  Code Status and Medical Interventions:   Ordered at: 03/31/21 1811     Code Status:    CPR     Medical Interventions (Level of Support Prior to Arrest):    Full       Plan for disposition:Where: rehab and current living arrangements and When:  2-3 days      Time: 15 min           This document has been electronically signed by Nirmal Calvillo MD on April 5, 2021 09:10 CDT

## 2021-04-05 NOTE — THERAPY TREATMENT NOTE
Patient Name: Yamileth Slater  : 1960    MRN: [id number]                              Today's Date: 2021       Admit Date: 3/31/2021    Visit Dx:     ICD-10-CM ICD-9-CM   1. Hyperglycemia  R73.9 790.29   2. Urinary tract infection in female  N39.0 599.0   3. Impaired mobility and ADLs  Z74.09 V49.89    Z78.9    4. Impaired functional mobility, balance, gait, and endurance  Z74.09 V49.89     Patient Active Problem List   Diagnosis   • Type 2 diabetes mellitus with diabetic polyneuropathy, with long-term current use of insulin (CMS/[facility])   • Chronic obstructive pulmonary disease (CMS/[facility])   • Low back pain   • Vitamin D deficiency   • Type 2 diabetes mellitus (CMS/[facility])   • Tobacco dependence syndrome   • Surgical follow-up care   • Shoulder joint pain   • Peripheral vascular disease (CMS/[facility])   • Pain   • Neurologic disorder associated with diabetes mellitus (CMS/[facility])   • Morbid obesity with BMI of 50.0-59.9, adult (CMS/[facility])   • Mixed hyperlipidemia   • Kidney stone   • History of colon polyps   • GERD (gastroesophageal reflux disease)   • Excoriated eczema   • Essential hypertension   • Encounter for medication refill   • Dyslipidemia   • Diabetes mellitus (CMS/[facility])   • Coronary artery disease   • Chronic obstructive lung disease (CMS/[facility])   • Chronic folliculitis   • Chest pain   • Mild persistent asthma   • Anxiety states   • 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   • Uncontrolled type 2 diabetes mellitus with complication, with long-term current use of insulin (CMS/[facility])   • Acute renal failure superimposed on chronic kidney disease (CMS/[facility])   • Anemia   • Hypothyroidism   • Carotid artery disease (CMS/[facility])   • Current smoker   • DM type 2 with diabetic peripheral neuropathy (CMS/[facility])   • [place] abuse   • PAD (peripheral artery disease) (CMS/[facility])   • Pneumonia of both lungs due to infectious organism   • S/P CABG x 3   • Intercostal pain   • Venous insufficiency   • Dyspnea on  exertion   • LAFB (left anterior fascicular block)   • Pulmonary hypertension (CMS/HCC)   • Left hip pain   • Neck pain   • Stage 3b chronic kidney disease (CMS/HCC)   • MIKE and COPD overlap syndrome (CMS/HCC)   • Pneumonia due to COVID-19 virus   • CKD (chronic kidney disease) stage 4, GFR 15-29 ml/min (CMS/HCC)   • Morbid obesity (CMS/HCC)   • Type 2 diabetes mellitus with hyperosmolar hyperglycemic state (HHS) (CMS/[facility])   • Metabolic acidosis   • Hyponatremia   • Hyperkalemia   • Hypocalcemia   • Anemia   • Acute cystitis   • Cutaneous candidiasis   • Community acquired pneumonia of right middle lobe of lung   • MRSA infection     Past Medical History:   Diagnosis Date   • Anxiety    • Carotid artery stenosis    • Chronic obstructive lung disease (CMS/HCC)    • CKD (chronic kidney disease) stage 4, GFR 15-29 ml/min (CMS/HCC)    • Colonic polyp    • Coronary arteriosclerosis    • Diabetes mellitus (CMS/HCC)    • Diabetic neuropathy (CMS/HCC)    • GERD (gastroesophageal reflux disease)    • Hypercholesterolemia    • Hypertension    • Morbid obesity (CMS/[facility])    • Nephrolithiasis    • Peripheral vascular disease (CMS/[facility])    • Sleep apnea    • Substance abuse (CMS/[facility])    • Vitamin D deficiency      Past Surgical History:   Procedure Laterality Date   • CARDIAC CATHETERIZATION N/A 7/14/2020   • CAROTID STENT Left    • COLONOSCOPY     • CORONARY ARTERY BYPASS GRAFT     • CYSTOSCOPY BLADDER STONE LITHOTRIPSY Bilateral      General Information     Row Name 04/05/21 1025          OT Time and Intention    Document Type  therapy note (daily note)  -     Mode of Treatment  individual therapy;occupational therapy  -     Row Name 04/05/21 1025          General Information    Existing Precautions/Restrictions  fall  -     Row Name 04/05/21 1025          Cognition    Orientation Status (Cognition)  oriented x 4  -     Row Name 04/05/21 1025          Safety Issues, Functional Mobility    Impairments Affecting Function  (Mobility)  cognition;balance;coordination;endurance/activity tolerance;sensation/sensory awareness;strength  -       User Key  (r) = Recorded By, (t) = Taken By, (c) = Cosigned By    Initials Name Provider Type    Janusz Shankar OT Occupational Therapist          Mobility/ADL's     Row Name 04/05/21 0125          Bed Mobility    Supine-Sit [place] (Bed Mobility)  minimum assist (75% patient effort);1 person assist  -     Sit-Supine [place] (Bed Mobility)  modified independence  -     Bed Mobility, Safety Issues  impaired trunk control for bed mobility  -     Assistive Device (Bed Mobility)  bed rails;head of bed elevated;draw sheet  -     Row Name 04/05/21 0125          Transfers    Transfers  sit-stand transfer;bed-chair transfer  -     Bed-Chair [place] (Transfers)  contact guard;1 person assist  -     Assistive Device (Bed-Chair Transfers)  walker, front-wheeled  -     Sit-Stand [place] (Transfers)  contact guard;1 person assist  -[facility] Name 04/05/21 0125          Sit-Stand Transfer    Assistive Device (Sit-Stand Transfers)  walker, front-wheeled  -     Row Name 04/05/21 0125          Functional Mobility    Functional Mobility- Ind. Level  contact guard assist  -     Functional Mobility-Distance (Feet)  10  -     Row Name 04/05/21 0125          Activities of Daily Living    BADL Assessment/Intervention  grooming  -[facility] Name 04/05/21 0125          Grooming Assessment/Training    [place] Level (Grooming)  oral care regimen;wash face, hands;hair care, combing/brushing;supervision;set up  -     Position (Grooming)  sink side  -       User Key  (r) = Recorded By, (t) = Taken By, (c) = Cosigned By    Initials Name Provider Type    Janusz Shankar OT Occupational Therapist        Obj/Interventions     Row Name 04/05/21 1025          Shoulder (Therapeutic Exercise)    Shoulder (Therapeutic Exercise)  strengthening exercise  -     Shoulder Strengthening  (Therapeutic Exercise)  bilateral;3 lb free weight  -JH     Row Name 04/05/21 1025          Elbow/Forearm (Therapeutic Exercise)    Elbow/Forearm (Therapeutic Exercise)  strengthening exercise  -     Elbow/Forearm Strengthening (Therapeutic Exercise)  3 lb free weight;bilateral  -JH     Row Name 04/05/21 1025          Wrist (Therapeutic Exercise)    Wrist (Therapeutic Exercise)  strengthening exercise  -     Wrist Strengthening (Therapeutic Exercise)  3 lb free weight;bilateral  -JH     Row Name 04/05/21 1025          Therapeutic Exercise    Therapeutic Exercise  shoulder;elbow/forearm;wrist  -       User Key  (r) = Recorded By, (t) = Taken By, (c) = Cosigned By    Initials Name Provider Type     Janusz Morris, OT Occupational Therapist        Goals/Plan     Row Name 04/05/21 1025          Transfer Goal 1 (OT)    Activity/Assistive Device (Transfer Goal 1, OT)  toilet;commode, bedside with drop arms;commode, bedside without drop arms  -     [place] Level/Cues Needed (Transfer Goal 1, OT)  minimum assist (75% or more patient effort)  -     Time Frame (Transfer Goal 1, OT)  long term goal (LTG);by discharge  -     Progress/Outcome (Transfer Goal 1, OT)  goal not met  -JH     Row Name 04/05/21 1025          Bathing Goal 1 (OT)    Activity/Device (Bathing Goal 1, OT)  bathing skills, all  -     [place] Level/Cues Needed (Bathing Goal 1, OT)  set-up required;standby assist;verbal cues required  -     Time Frame (Bathing Goal 1, OT)  long term goal (LTG);by discharge  -     Progress/Outcomes (Bathing Goal 1, OT)  goal not met  -JH     Row Name 04/05/21 1025          Dressing Goal 1 (OT)    Activity/Device (Dressing Goal 1, OT)  dressing skills, all  -     [place]/Cues Needed (Dressing Goal 1, OT)  set-up required;standby assist;verbal cues required  -     Time Frame (Dressing Goal 1, OT)  long term goal (LTG);by discharge  -     Progress/Outcome (Dressing Goal 1, OT)  goal not met   -       User Key  (r) = Recorded By, (t) = Taken By, (c) = Cosigned By    Initials Name Provider Type    Janusz Shankar OT Occupational Therapist        Clinical Impression     Row Name 04/05/21 1025          Pain Assessment    Additional Documentation  Pain Scale: Numbers Pre/Post-Treatment (Group)  -[facility] Name 04/05/21 1025          Pain Scale: Numbers Pre/Post-Treatment    Pretreatment Pain Rating  10/10  -     Posttreatment Pain Rating  10/10  -     Pain Location  back  -     Pain Intervention(s)  Medication (See MAR);Repositioned;Emotional support;Ambulation/increased activity  -     Row Name 04/05/21 1025          Vital Signs    Post Systolic BP Rehab  122  -     Post Treatment Diastolic BP  70  -     Posttreatment Heart Rate (beats/min)  73  -     Post SpO2 (%)  95  -     O2 Delivery Post Treatment  room air  -     Pre Patient Position  Supine  -     Intra Patient Position  Sitting  -     Post Patient Position  Supine  -[facility] Name 04/05/21 1025          Positioning and Restraints    Pre-Treatment Position  in bed  -     Post Treatment Position  bed  -     In Bed  supine;call light within reach;encouraged to call for assist;exit alarm on;side rails up x2;[place] [facility] [place]  -       User Key  (r) = Recorded By, (t) = Taken By, (c) = Cosigned By    Initials Name Provider Type    Janusz Shankar OT Occupational Therapist        Outcome Measures     Row Name 04/05/21 1025          How much help from another is currently needed...    Putting on and taking off regular lower body clothing?  2  -JH     Bathing (including washing, rinsing, and drying)  2  -     Toileting (which includes using toilet bed pan or urinal)  2  -JH     Putting on and taking off regular upper body clothing  2  -     Taking care of personal grooming (such as brushing teeth)  3  -JH     Eating meals  4  -     AM-PAC 6 Clicks Score (OT)  15  -[facility] Name 04/05/21 1025          Functional  Assessment    Outcome Measure Options  AM-PAC 6 Clicks Daily Activity (OT)  -       User Key  (r) = Recorded By, (t) = Taken By, (c) = Cosigned By    Initials Name Provider Type    Janusz Shankar OT Occupational Therapist        Occupational Therapy Education                 Title: PT OT SLP Therapies (In Progress)     Topic: Occupational Therapy (In Progress)     Point: ADL training (In Progress)     Description:   Instruct learner(s) on proper safety adaptation and remediation techniques during self care or transfers.   Instruct in proper use of assistive devices.              Learning Progress Summary           Patient Acceptance, E, NR by  at 4/4/2021 1309    Acceptance, E, NR by  at 4/2/2021 1223    Comment: Educated about OT and POC. Educated on safety throughout, educated on precuations with right wrist. educated to call for assist.   Family Acceptance, E, NR by  at 4/2/2021 1223    Comment: Educated about OT and POC. Educated on safety throughout, educated on precuations with right wrist. educated to call for assist.                   Point: Home exercise program (Not Started)     Description:   Instruct learner(s) on appropriate technique for monitoring, assisting and/or progressing therapeutic exercises/activities.              Learner Progress:  Not documented in this visit.          Point: Precautions (In Progress)     Description:   Instruct learner(s) on prescribed precautions during self-care and functional transfers.              Learning Progress Summary           Patient Acceptance, E, NR by  at 4/2/2021 1223    Comment: Educated about OT and POC. Educated on safety throughout, educated on precuations with right wrist. educated to call for assist.   Family Acceptance, E, NR by  at 4/2/2021 1223    Comment: Educated about OT and POC. Educated on safety throughout, educated on precuations with right wrist. educated to call for assist.                   Point: Body mechanics (Not  Started)     Description:   Instruct learner(s) on proper positioning and spine alignment during self-care, functional mobility activities and/or exercises.              Learner Progress:  Not documented in this visit.                      User Key     Initials Effective Dates Name Provider Type [facility] 06/08/18 -  Mary Jo Michel, OTR/L Occupational Therapist OT     03/07/18 -  Nissa Bey, OLMSTEAD/L Occupational Therapy Assistant OT              OT Recommendation and Plan     Plan of Care Review  Plan of Care Reviewed With: patient  Outcome Summary: OT Treatment completed on this date. Pt daughter present upona rrival to room. Pt complaining of low back and buttocks pain. Bed Mobility: Min A for Sup<>Sit and Mod I for Sit<>Sup Transfers; CGA with use of RW. Pt completed oral hygiene, combing hair, and washing face while sitting sink side with supervision with set-up. Pt mentation much improved on this date. Pt completed BUE ther ex while sitting EOB - 1 x 20 with 3 lb dowel ericka bilaterally to improve BUE strength/endurance needed for the completion of functional activities. Pt making progress during hospital stay - if pt is able to continue to demonstrate progress during hospital stay, pt may benefit from d/c home with HHOT. Will monitor closesly.     Time Calculation:   Time Calculation- OT     Row Name 04/05/21 1220             Time Calculation-     OT Start Time  1025  -      OT Stop Time  1120  -      OT Time Calculation (min)  55 min  -      OT Received On  04/05/21  -        User Key  (r) = Recorded By, (t) = Taken By, (c) = Cosigned By    Initials Name Provider Type     Janusz Morris OT Occupational Therapist        Therapy Charges for Today     Code Description Service Date Service Provider Modifiers Qty    53419707491  OT SELF CARE/MGMT/TRAIN EA 15 MIN 4/5/2021 Janusz Morris OT GO 2    [id number]  OT THER PROC EA 15 MIN 4/5/2021 Janusz Morris OT GO 2               Janusz  Arturo, SARA  4/5/2021

## 2021-04-06 LAB
ALBUMIN SERPL-MCNC: 3.1 G/DL (ref 3.5–5.2)
ALBUMIN/GLOB SERPL: 0.7 G/DL
ALP SERPL-CCNC: 200 U/L (ref 39–117)
ALT SERPL W P-5'-P-CCNC: 12 U/L (ref 1–33)
ANION GAP SERPL CALCULATED.3IONS-SCNC: 9 MMOL/L (ref 5–15)
AST SERPL-CCNC: 13 U/L (ref 1–32)
BASOPHILS # BLD AUTO: 0.1 10*3/MM3 (ref 0–0.2)
BASOPHILS NFR BLD AUTO: 1 % (ref 0–1.5)
BILIRUB SERPL-MCNC: 0.2 MG/DL (ref 0–1.2)
BUN SERPL-MCNC: 27 MG/DL (ref 8–23)
BUN/CREAT SERPL: 11.4 (ref 7–25)
CALCIUM SPEC-SCNC: 8.8 MG/DL (ref 8.6–10.5)
CHLORIDE SERPL-SCNC: 102 MMOL/L (ref 98–107)
CO2 SERPL-SCNC: 21 MMOL/L (ref 22–29)
CREAT SERPL-MCNC: 2.36 MG/DL (ref 0.57–1)
DEPRECATED RDW RBC AUTO: 47.8 FL (ref 37–54)
EOSINOPHIL # BLD AUTO: 0.24 10*3/MM3 (ref 0–0.4)
EOSINOPHIL NFR BLD AUTO: 2.5 % (ref 0.3–6.2)
ERYTHROCYTE [DISTWIDTH] IN BLOOD BY AUTOMATED COUNT: 15.1 % (ref 12.3–15.4)
GFR SERPL CREATININE-BSD FRML MDRD: 21 ML/MIN/1.73
GLOBULIN UR ELPH-MCNC: 4.2 GM/DL
GLUCOSE BLDC GLUCOMTR-MCNC: 192 MG/DL (ref 70–130)
GLUCOSE BLDC GLUCOMTR-MCNC: 226 MG/DL (ref 70–130)
GLUCOSE BLDC GLUCOMTR-MCNC: 414 MG/DL (ref 70–130)
GLUCOSE SERPL-MCNC: 425 MG/DL (ref 65–99)
HCT VFR BLD AUTO: 31.6 % (ref 34–46.6)
HGB BLD-MCNC: 10.3 G/DL (ref 12–15.9)
IMM GRANULOCYTES # BLD AUTO: 0.52 10*3/MM3 (ref 0–0.05)
IMM GRANULOCYTES NFR BLD AUTO: 5.4 % (ref 0–0.5)
LYMPHOCYTES # BLD AUTO: 2.59 10*3/MM3 (ref 0.7–3.1)
LYMPHOCYTES NFR BLD AUTO: 27 % (ref 19.6–45.3)
MCH RBC QN AUTO: 28.6 PG (ref 26.6–33)
MCHC RBC AUTO-ENTMCNC: 32.6 G/DL (ref 31.5–35.7)
MCV RBC AUTO: 87.8 FL (ref 79–97)
MONOCYTES # BLD AUTO: 0.79 10*3/MM3 (ref 0.1–0.9)
MONOCYTES NFR BLD AUTO: 8.2 % (ref 5–12)
NEUTROPHILS NFR BLD AUTO: 5.34 10*3/MM3 (ref 1.7–7)
NEUTROPHILS NFR BLD AUTO: 55.9 % (ref 42.7–76)
NRBC BLD AUTO-RTO: 0.3 /100 WBC (ref 0–0.2)
PLATELET # BLD AUTO: 317 10*3/MM3 (ref 140–450)
PMV BLD AUTO: 9 FL (ref 6–12)
POTASSIUM SERPL-SCNC: 4.2 MMOL/L (ref 3.5–5.2)
PROT SERPL-MCNC: 7.3 G/DL (ref 6–8.5)
RBC # BLD AUTO: 3.6 10*6/MM3 (ref 3.77–5.28)
SODIUM SERPL-SCNC: 132 MMOL/L (ref 136–145)
WBC # BLD AUTO: 9.58 10*3/MM3 (ref 3.4–10.8)

## 2021-04-06 PROCEDURE — 25010000002 HEPARIN (PORCINE) PER 1000 UNITS: Performed by: CHIROPRACTOR

## 2021-04-06 PROCEDURE — 94760 N-INVAS EAR/PLS OXIMETRY 1: CPT

## 2021-04-06 PROCEDURE — 97116 GAIT TRAINING THERAPY: CPT

## 2021-04-06 PROCEDURE — 97110 THERAPEUTIC EXERCISES: CPT

## 2021-04-06 PROCEDURE — 63710000001 ONDANSETRON ODT 4 MG TABLET DISPERSIBLE: Performed by: CHIROPRACTOR

## 2021-04-06 PROCEDURE — 97535 SELF CARE MNGMENT TRAINING: CPT

## 2021-04-06 PROCEDURE — 63710000001 INSULIN ASPART PER 5 UNITS: Performed by: STUDENT IN AN ORGANIZED HEALTH CARE EDUCATION/TRAINING PROGRAM

## 2021-04-06 PROCEDURE — 97530 THERAPEUTIC ACTIVITIES: CPT

## 2021-04-06 PROCEDURE — 85025 COMPLETE CBC W/AUTO DIFF WBC: CPT | Performed by: CHIROPRACTOR

## 2021-04-06 PROCEDURE — 80053 COMPREHEN METABOLIC PANEL: CPT | Performed by: CHIROPRACTOR

## 2021-04-06 PROCEDURE — 99232 SBSQ HOSP IP/OBS MODERATE 35: CPT | Performed by: STUDENT IN AN ORGANIZED HEALTH CARE EDUCATION/TRAINING PROGRAM

## 2021-04-06 PROCEDURE — 82962 GLUCOSE BLOOD TEST: CPT

## 2021-04-06 PROCEDURE — 63710000001 INSULIN DETEMIR PER 5 UNITS: Performed by: STUDENT IN AN ORGANIZED HEALTH CARE EDUCATION/TRAINING PROGRAM

## 2021-04-06 PROCEDURE — 94799 UNLISTED PULMONARY SVC/PX: CPT

## 2021-04-06 RX ORDER — GABAPENTIN 400 MG/1
400 CAPSULE ORAL 3 TIMES DAILY
Status: DISCONTINUED | OUTPATIENT
Start: 2021-04-06 | End: 2021-04-07

## 2021-04-06 RX ORDER — SODIUM CHLORIDE 9 MG/ML
75 INJECTION, SOLUTION INTRAVENOUS CONTINUOUS
Status: DISCONTINUED | OUTPATIENT
Start: 2021-04-06 | End: 2021-04-07

## 2021-04-06 RX ADMIN — GABAPENTIN 400 MG: 400 CAPSULE ORAL at 20:35

## 2021-04-06 RX ADMIN — INSULIN ASPART 20 UNITS: 100 INJECTION, SOLUTION INTRAVENOUS; SUBCUTANEOUS at 11:03

## 2021-04-06 RX ADMIN — IPRATROPIUM BROMIDE AND ALBUTEROL SULFATE 3 ML: 2.5; .5 SOLUTION RESPIRATORY (INHALATION) at 08:07

## 2021-04-06 RX ADMIN — OXYBUTYNIN CHLORIDE 5 MG: 5 TABLET, EXTENDED RELEASE ORAL at 08:47

## 2021-04-06 RX ADMIN — GABAPENTIN 400 MG: 400 CAPSULE ORAL at 08:47

## 2021-04-06 RX ADMIN — POTASSIUM CHLORIDE 10 MEQ: 750 CAPSULE, EXTENDED RELEASE ORAL at 08:47

## 2021-04-06 RX ADMIN — HEPARIN SODIUM 5000 UNITS: 5000 INJECTION INTRAVENOUS; SUBCUTANEOUS at 21:39

## 2021-04-06 RX ADMIN — IPRATROPIUM BROMIDE AND ALBUTEROL SULFATE 3 ML: 2.5; .5 SOLUTION RESPIRATORY (INHALATION) at 11:05

## 2021-04-06 RX ADMIN — CLINDAMYCIN HYDROCHLORIDE 600 MG: 150 CAPSULE ORAL at 06:13

## 2021-04-06 RX ADMIN — ATORVASTATIN CALCIUM 40 MG: 40 TABLET, FILM COATED ORAL at 08:47

## 2021-04-06 RX ADMIN — INSULIN DETEMIR 30 UNITS: 100 INJECTION, SOLUTION SUBCUTANEOUS at 08:48

## 2021-04-06 RX ADMIN — METOPROLOL TARTRATE 100 MG: 50 TABLET, FILM COATED ORAL at 08:47

## 2021-04-06 RX ADMIN — ISOSORBIDE MONONITRATE 30 MG: 30 TABLET, EXTENDED RELEASE ORAL at 08:47

## 2021-04-06 RX ADMIN — IPRATROPIUM BROMIDE AND ALBUTEROL SULFATE 3 ML: 2.5; .5 SOLUTION RESPIRATORY (INHALATION) at 20:11

## 2021-04-06 RX ADMIN — ACETAMINOPHEN 650 MG: 325 TABLET, FILM COATED ORAL at 13:56

## 2021-04-06 RX ADMIN — SODIUM CHLORIDE 75 ML/HR: 9 INJECTION, SOLUTION INTRAVENOUS at 11:38

## 2021-04-06 RX ADMIN — GABAPENTIN 400 MG: 400 CAPSULE ORAL at 17:26

## 2021-04-06 RX ADMIN — INSULIN DETEMIR 30 UNITS: 100 INJECTION, SOLUTION SUBCUTANEOUS at 20:49

## 2021-04-06 RX ADMIN — PANTOPRAZOLE SODIUM 40 MG: 40 TABLET, DELAYED RELEASE ORAL at 20:35

## 2021-04-06 RX ADMIN — INSULIN ASPART 16 UNITS: 100 INJECTION, SOLUTION INTRAVENOUS; SUBCUTANEOUS at 11:03

## 2021-04-06 RX ADMIN — SODIUM BICARBONATE 1300 MG: 650 TABLET ORAL at 20:35

## 2021-04-06 RX ADMIN — CLINDAMYCIN HYDROCHLORIDE 600 MG: 150 CAPSULE ORAL at 21:39

## 2021-04-06 RX ADMIN — SODIUM CHLORIDE, PRESERVATIVE FREE 10 ML: 5 INJECTION INTRAVENOUS at 11:38

## 2021-04-06 RX ADMIN — MONTELUKAST SODIUM 10 MG: 10 TABLET, COATED ORAL at 20:35

## 2021-04-06 RX ADMIN — INSULIN ASPART 8 UNITS: 100 INJECTION, SOLUTION INTRAVENOUS; SUBCUTANEOUS at 17:25

## 2021-04-06 RX ADMIN — NYSTATIN: 100000 POWDER TOPICAL at 17:26

## 2021-04-06 RX ADMIN — IPRATROPIUM BROMIDE AND ALBUTEROL SULFATE 3 ML: 2.5; .5 SOLUTION RESPIRATORY (INHALATION) at 15:27

## 2021-04-06 RX ADMIN — INSULIN ASPART 24 UNITS: 100 INJECTION, SOLUTION INTRAVENOUS; SUBCUTANEOUS at 11:02

## 2021-04-06 RX ADMIN — INSULIN ASPART 24 UNITS: 100 INJECTION, SOLUTION INTRAVENOUS; SUBCUTANEOUS at 08:47

## 2021-04-06 RX ADMIN — HEPARIN SODIUM 5000 UNITS: 5000 INJECTION INTRAVENOUS; SUBCUTANEOUS at 13:23

## 2021-04-06 RX ADMIN — CLINDAMYCIN HYDROCHLORIDE 600 MG: 150 CAPSULE ORAL at 13:24

## 2021-04-06 RX ADMIN — SODIUM CHLORIDE 75 ML/HR: 9 INJECTION, SOLUTION INTRAVENOUS at 23:55

## 2021-04-06 RX ADMIN — NYSTATIN: 100000 POWDER TOPICAL at 08:47

## 2021-04-06 RX ADMIN — CLOPIDOGREL BISULFATE 75 MG: 75 TABLET ORAL at 08:47

## 2021-04-06 RX ADMIN — ONDANSETRON 4 MG: 4 TABLET, ORALLY DISINTEGRATING ORAL at 06:39

## 2021-04-06 RX ADMIN — GABAPENTIN 100 MG: 100 CAPSULE ORAL at 05:06

## 2021-04-06 RX ADMIN — SODIUM CHLORIDE, PRESERVATIVE FREE 10 ML: 5 INJECTION INTRAVENOUS at 20:35

## 2021-04-06 RX ADMIN — INSULIN ASPART 4 UNITS: 100 INJECTION, SOLUTION INTRAVENOUS; SUBCUTANEOUS at 08:48

## 2021-04-06 RX ADMIN — NYSTATIN: 100000 POWDER TOPICAL at 20:35

## 2021-04-06 RX ADMIN — METOPROLOL TARTRATE 100 MG: 50 TABLET, FILM COATED ORAL at 20:35

## 2021-04-06 RX ADMIN — INSULIN ASPART 16 UNITS: 100 INJECTION, SOLUTION INTRAVENOUS; SUBCUTANEOUS at 17:26

## 2021-04-06 RX ADMIN — HEPARIN SODIUM 5000 UNITS: 5000 INJECTION INTRAVENOUS; SUBCUTANEOUS at 05:06

## 2021-04-06 RX ADMIN — ONDANSETRON 4 MG: 4 TABLET, ORALLY DISINTEGRATING ORAL at 13:24

## 2021-04-06 RX ADMIN — SODIUM BICARBONATE 1300 MG: 650 TABLET ORAL at 08:47

## 2021-04-06 NOTE — PROGRESS NOTES
"[facility]  [address] [place] [address]. [address]  T - [phone]  F - [phone]     Progress Note          PATIENT  DEMOGRAPHICS   PATIENT NAME: Yamileth Slater                      PHYSICIAN: JmBRIDGETT Watson  : 1960  MRN: [id number]   LOS: 5 days    Patient Care Team:  Nirmal Calvillo MD as PCP - General (Family Medicine)  Subjective   SUBJECTIVE   No acute events overnight.    at bedside       Objective   OBJECTIVE   Vital Signs  Temp:  [96.6 °F (35.9 °C)-97.4 °F (36.3 °C)] 97.4 °F (36.3 °C)  Heart Rate:  [67-82] 79  Resp:  [18-22] 18  BP: (132-160)/(56-78) 150/77    Flowsheet Rows      First Filed Value   Admission Height  157.5 cm (62\") Documented at 2021 1136   Admission Weight  125 kg (275 lb) Documented at 2021 1136           I/O last 3 completed shifts:  In: 1600 [P.O.:1500; IV Piggyback:100]  Out: 3100 [Urine:3100]    PHYSICAL EXAM    Physical Exam  Vitals and nursing note reviewed.   Constitutional:       Appearance: She is obese.   HENT:      Head: Normocephalic and atraumatic.   Cardiovascular:      Rate and Rhythm: Normal rate and regular rhythm.   Pulmonary:      Effort: Pulmonary effort is normal.      Breath sounds: Normal breath sounds.   Abdominal:      General: There is distension.   Musculoskeletal:      Right lower leg: Edema present.      Left lower leg: Edema present.   Skin:     General: Skin is warm and dry.      Coloration: Skin is pale.   Neurological:      Mental Status: She is alert. Mental status is at baseline.   Psychiatric:      Comments: irritable         RESULTS   Results Review:    Results from last 7 days   Lab Units 21  0543 21  0537 21  0623   SODIUM mmol/L 132* 135* 138   POTASSIUM mmol/L 4.2 3.9 4.1   CHLORIDE mmol/L 102 105 108*   CO2 mmol/L 21.0* 19.0* 18.0*   BUN mg/dL 27* 22 21   CREATININE mg/dL 2.36* 1.88* 1.88*   CALCIUM mg/dL 8.8 8.8 9.4   BILIRUBIN mg/dL 0.2 0.2 0.3   ALK " PHOS U/L 200* 178* 185*   ALT (SGPT) U/L 12 11 12   AST (SGOT) U/L 13 13 11   GLUCOSE mg/dL 425* 334* 283*       Estimated Creatinine Clearance: 31.3 mL/min (A) (by C-G formula based on SCr of 2.36 mg/dL (H)).    Results from last 7 days   Lab Units 04/01/21  0345 03/31/21  2350 03/31/21  1949   MAGNESIUM mg/dL 1.8 2.0 1.9   PHOSPHORUS mg/dL 3.8 3.4 3.6             Results from last 7 days   Lab Units 04/06/21  0543 04/05/21  0537 04/04/21  0623 04/03/21  0321 04/02/21  0918   WBC 10*3/mm3 9.58 8.05 8.17 8.36 8.79   HEMOGLOBIN g/dL 10.3* 9.3* 10.0* 8.5* 8.9*   PLATELETS 10*3/mm3 317 294 310 263 240       Results from last 7 days   Lab Units 03/31/21  1258   INR  1.01         Imaging Results (Last 24 Hours)     ** No results found for the last 24 hours. **           MEDICATIONS    atorvastatin, 40 mg, Oral, Daily  clindamycin, 600 mg, Oral, Q8H  clopidogrel, 75 mg, Oral, Daily  gabapentin, 400 mg, Oral, TID  heparin (porcine), 5,000 Units, Subcutaneous, Q8H  insulin aspart, 0-24 Units, Subcutaneous, TID AC  insulin aspart, 16 Units, Subcutaneous, TID With Meals  insulin detemir, 30 Units, Subcutaneous, Q12H  ipratropium-albuterol, 3 mL, Nebulization, 4x Daily - RT  isosorbide mononitrate, 30 mg, Oral, Daily  metoprolol tartrate, 100 mg, Oral, Q12H  montelukast, 10 mg, Oral, Nightly  nystatin, , Topical, TID  oxybutynin XL, 5 mg, Oral, Daily  pantoprazole, 40 mg, Oral, Nightly  potassium chloride, 10 mEq, Oral, Daily  sodium bicarbonate, 1,300 mg, Oral, BID  sodium chloride, 10 mL, Intravenous, Q12H  sodium chloride, 10 mL, Intravenous, Q12H  sodium chloride, 10 mL, Intravenous, Q12H      sodium chloride, 75 mL/hr, Last Rate: 75 mL/hr (04/06/21 0981)        Assessment/Plan   ASSESSMENT / PLAN      Type 2 diabetes mellitus with hyperosmolar hyperglycemic state (HHS) (CMS/[facility])    GERD (gastroesophageal reflux disease)    Coronary artery disease    Chronic obstructive lung disease (CMS/[facility])    Acute renal failure  superimposed on chronic kidney disease (CMS/HCC)    Current smoker    Metabolic acidosis    Hyponatremia    Hyperkalemia    Hypocalcemia    Anemia    Acute cystitis    Cutaneous candidiasis    Community acquired pneumonia of right middle lobe of lung    MRSA infection    1. Acute Kidney Failure on CKD 4: Baseline creatinine 1.8-2.0 mg/dl  - Developed ERIKA in the setting of DKA.   - UA showed 2+ protein, 1+ blood, 21-30 RBCs, numerous WBCs. Hansels stain is negative.  Urine sodium 115, creatinine 14.9, protein 81.8  - Renal US no hydro  - Creatinine is 2.3 mg/dl- back on IVF, continue PO bicarb  - Maintain hemodynamics. Monitor I&Os. Avoid nephrotoxins like NSAIDs and IV contrast     2. Hypertension:   - Blood pressure is controlled     3. Anemia:  - Hemoglobin is stable. Finished IV iron     4. DKA: resolved     5. Diabetes type 2     6. Diastolic heart failure     7. Sleep apnea     8. Obesity     9. Metabolic Acidosis- on sodium bicarb 1300mg po bid            This document has been electronically signed by BRIDGETT Hadley on April 6, 2021 13:12 CDT      For this patient encounter, I have reviewed the Nurse Practitioner's documentation, medical decision making, and treatment plan and personally spent time with the patient.

## 2021-04-06 NOTE — PLAN OF CARE
Problem: Adult Inpatient Plan of Care  Goal: Plan of Care Review  Flowsheets  Taken 4/6/2021 1451  Progress: no change  Plan of Care Reviewed With: patient  Taken 4/6/2021 1240  Progress: no change  Plan of Care Reviewed With: patient  Outcome Summary: Pt sitting in chair and agrees to B UE exercises with 1 lb wt to L UE 2 sets x 15 reps with RBs as needed, no wt R UE. Pt educated on home safety/fall prevention. All needs within reach following oT tx.   Goal Outcome Evaluation:  Plan of Care Reviewed With: patient  Progress: no change  Outcome Summary: Pt sitting in chair and agrees to B UE exercises with 1 lb wt to L UE 2 sets x 15 reps with RBs as needed, no wt R UE. Pt educated on home safety/fall prevention. All needs within reach following oT tx.

## 2021-04-06 NOTE — PROGRESS NOTES
FAMILY MEDICINE DAILY PROGRESS NOTE  NAME: Yamileth Slater  : 1960  MRN: [id number]     LOS: 5 days     PROVIDER OF SERVICE: Nirmal Calvillo MD    Chief Complaint: Type 2 diabetes mellitus with hyperosmolar hyperglycemic state (HHS) (CMS/[facility])    Subjective:     Interval History:  History taken from: patient chart family  VSS with no acute complaints overnight. Patient states is emotional today due to all the changes in her care, and sickness in the last few months. Continues to have Coccyx pain but XR yesterday normal. Glucose elevated despite increase in basal insulin yesterday. Pt and spouse deny any outside food. CMP worsening with decreasing sodium and increasing creatinine.     Review of Systems:   Review of Systems   Constitutional: Negative for activity change and appetite change.   HENT: Negative for congestion and trouble swallowing.    Respiratory: Negative for chest tightness and shortness of breath.    Cardiovascular: Negative for chest pain and palpitations.   Gastrointestinal: Negative for abdominal distention and abdominal pain.   Genitourinary: Negative for difficulty urinating and dysuria.   Musculoskeletal: Positive for arthralgias (coccyx). Negative for myalgias.   Skin: Negative for color change and pallor.   Neurological: Negative for dizziness, light-headedness and headaches.   Psychiatric/Behavioral: Positive for dysphoric mood. Negative for agitation and behavioral problems.       Objective:     Vital Signs  Temp:  [96.6 °F (35.9 °C)-97.7 °F (36.5 °C)] 97.2 °F (36.2 °C)  Heart Rate:  [67-82] 68  Resp:  [18-22] 18  BP: (122-160)/(56-78) 160/78    Physical Exam  Physical Exam  Constitutional:       General: She is not in acute distress.     Appearance: She is well-developed.   HENT:      Head: Normocephalic and atraumatic.      Right Ear: External ear normal.      Left Ear: External ear normal.      Nose: Nose normal.   Eyes:      Extraocular Movements: Extraocular movements  intact.      Pupils: Pupils are equal, round, and reactive to light.   Cardiovascular:      Rate and Rhythm: Normal rate and regular rhythm.      Heart sounds: Normal heart sounds. No murmur heard.   No friction rub. No gallop.    Pulmonary:      Effort: Pulmonary effort is normal. No respiratory distress.      Breath sounds: Normal breath sounds. No wheezing or rales.   Abdominal:      General: Bowel sounds are normal. There is no distension.      Palpations: Abdomen is soft.      Tenderness: There is no abdominal tenderness.   Musculoskeletal:         General: Normal range of motion.      Cervical back: Normal range of motion and neck supple.      Right lower leg: Edema present.      Left lower leg: Edema present.   Skin:     General: Skin is warm.      Findings: No erythema.   Neurological:      Mental Status: She is alert and oriented to person, place, and time. Mental status is at baseline.   Psychiatric:         Mood and Affect: Mood normal. Affect is labile.         Behavior: Behavior normal.         Medication Review    Current Facility-Administered Medications:   •  acetaminophen (TYLENOL) tablet 650 mg, 650 mg, Oral, Q6H PRN, Huy Santa MD, 650 mg at 04/05/21 2103  •  albuterol (PROVENTIL) nebulizer solution 0.083% 2.5 mg/3mL, 2.5 mg, Nebulization, Q4H PRN, Huy Santa MD  •  atorvastatin (LIPITOR) tablet 40 mg, 40 mg, Oral, Daily, Huy Santa MD, 40 mg at 04/06/21 0847  •  clindamycin (CLEOCIN) capsule 600 mg, 600 mg, Oral, Q8H, Nirmal Calvillo MD, 600 mg at 04/06/21 0613  •  clopidogrel (PLAVIX) tablet 75 mg, 75 mg, Oral, Daily, Huy Santa MD, 75 mg at 04/06/21 0847  •  cyclobenzaprine (FLEXERIL) tablet 10 mg, 10 mg, Oral, BID PRN, Huy Santa MD, 10 mg at 04/05/21 0003  •  dextrose (D50W) 25 g/ 50mL Intravenous Solution 25 g, 25 g, Intravenous, Q15 Min PRN, Huy Santa MD  •  dextrose (GLUTOSE) oral gel 15 g, 15 g, Oral, Q15 Min PRN, Huy Santa MD  •  gabapentin  (NEURONTIN) capsule 400 mg, 400 mg, Oral, TID, Nirmal Calvillo MD, 400 mg at 04/06/21 0847  •  glucagon (human recombinant) (GLUCAGEN DIAGNOSTIC) injection 1 mg, 1 mg, Subcutaneous, Q15 Min PRN, Huy Santa MD  •  heparin (porcine) 5000 UNIT/ML injection 5,000 Units, 5,000 Units, Subcutaneous, Q8H, Huy Santa MD, 5,000 Units at 04/06/21 0506  •  insulin aspart (novoLOG) injection 0-24 Units, 0-24 Units, Subcutaneous, TID AC, Haily Mcneil MD, 24 Units at 04/06/21 0847  •  insulin aspart (novoLOG) injection 4 Units, 4 Units, Subcutaneous, TID With Meals, Umesh Giraldo III, MD, 4 Units at 04/06/21 0848  •  insulin detemir (LEVEMIR) injection 30 Units, 30 Units, Subcutaneous, Q12H, Nirmal Calvillo MD, 30 Units at 04/06/21 0848  •  ipratropium-albuterol (DUO-NEB) nebulizer solution 3 mL, 3 mL, Nebulization, 4x Daily - RT, Huy Santa MD, 3 mL at 04/06/21 0807  •  isosorbide mononitrate (IMDUR) 24 hr tablet 30 mg, 30 mg, Oral, Daily, Huy Santa MD, 30 mg at 04/06/21 0847  •  metoprolol tartrate (LOPRESSOR) tablet 100 mg, 100 mg, Oral, Q12H, Huy Santa MD, 100 mg at 04/06/21 0847  •  montelukast (SINGULAIR) tablet 10 mg, 10 mg, Oral, Nightly, Huy Santa MD, 10 mg at 04/05/21 2045  •  nitroglycerin (NITROSTAT) SL tablet 0.4 mg, 0.4 mg, Sublingual, PRN, Huy Santa MD  •  nystatin (MYCOSTATIN) powder, , Topical, TID, Huy Santa MD, Given at 04/06/21 0847  •  ondansetron ODT (ZOFRAN-ODT) disintegrating tablet 4 mg, 4 mg, Oral, Q8H PRN, Huy Santa MD, 4 mg at 04/06/21 0639  •  oxybutynin XL (DITROPAN-XL) 24 hr tablet 5 mg, 5 mg, Oral, Daily, Huy Santa MD, 5 mg at 04/06/21 0847  •  pantoprazole (PROTONIX) EC tablet 40 mg, 40 mg, Oral, Nightly, Huy Santa MD, 40 mg at 04/05/21 2045  •  potassium chloride (MICRO-K) CR capsule 10 mEq, 10 mEq, Oral, Daily, Huy Santa MD, 10 mEq at 04/06/21 0847  •  promethazine (PHENERGAN) tablet 25 mg, 25 mg, Oral, Q6H PRN,  Huy Santa MD  •  sodium bicarbonate tablet 1,300 mg, 1,300 mg, Oral, BID, Scott Lillian, APRN, 1,300 mg at 04/06/21 0847  •  sodium chloride 0.9 % flush 10 mL, 10 mL, Intravenous, PRN, Huy Santa MD  •  [COMPLETED] Insert peripheral IV, , , Once **AND** sodium chloride 0.9 % flush 10 mL, 10 mL, Intravenous, PRN, Huy Santa MD  •  sodium chloride 0.9 % flush 10 mL, 10 mL, Intravenous, Q12H, Huy Santa MD, 10 mL at 04/05/21 0937  •  sodium chloride 0.9 % flush 10 mL, 10 mL, Intravenous, PRN, Huy Santa MD  •  sodium chloride 0.9 % flush 10 mL, 10 mL, Intravenous, Q12H, Huy Santa MD, 10 mL at 04/05/21 0937  •  sodium chloride 0.9 % flush 10 mL, 10 mL, Intravenous, Q12H, Huy Santa MD, 10 mL at 04/05/21 2046  •  sodium chloride 0.9 % flush 10 mL, 10 mL, Intravenous, PRN, Huy Santa MD     Diagnostic Data    Lab Results (last 24 hours)     Procedure Component Value Units Date/Time    POC Glucose Once [[id number]]  (Abnormal) Collected: 04/06/21 0627    Specimen: Blood Updated: 04/06/21 0851     Glucose 414 mg/dL      Comment: RN NotifiedOperator: [id number] [facility] ID: [id number]       Comprehensive Metabolic Panel [[id number]]  (Abnormal) Collected: 04/06/21 0543    Specimen: Blood Updated: 04/06/21 0628     Glucose 425 mg/dL      BUN 27 mg/dL      Creatinine 2.36 mg/dL      Sodium 132 mmol/L      Potassium 4.2 mmol/L      Chloride 102 mmol/L      CO2 21.0 mmol/L      Calcium 8.8 mg/dL      Total Protein 7.3 g/dL      Albumin 3.10 g/dL      ALT (SGPT) 12 U/L      AST (SGOT) 13 U/L      Alkaline Phosphatase 200 U/L      Total Bilirubin 0.2 mg/dL      eGFR Non African Amer 21 mL/min/1.73      Globulin 4.2 gm/dL      A/G Ratio 0.7 g/dL      BUN/Creatinine Ratio 11.4     Anion Gap 9.0 mmol/L     Narrative:      GFR Normal >60  Chronic Kidney Disease <60  Kidney Failure <15      CBC & Differential [[id number]]  (Abnormal) Collected: 04/06/21 0543    Specimen: Blood  Updated: 04/06/21 0600    Narrative:      The following orders were created for panel order CBC & Differential.  Procedure                               Abnormality         Status                     ---------                               -----------         ------                     Scan Slide[[id number]]                                                                  CBC Auto Differential[[id number]]        Abnormal            Final result                 Please view results for these tests on the individual orders.    CBC Auto Differential [[id number]]  (Abnormal) Collected: 04/06/21 0543    Specimen: Blood Updated: 04/06/21 0600     WBC 9.58 10*3/mm3      RBC 3.60 10*6/mm3      Hemoglobin 10.3 g/dL      Hematocrit 31.6 %      MCV 87.8 fL      MCH 28.6 pg      MCHC 32.6 g/dL      RDW 15.1 %      RDW-SD 47.8 fl      MPV 9.0 fL      Platelets 317 10*3/mm3      Neutrophil % 55.9 %      Lymphocyte % 27.0 %      Monocyte % 8.2 %      Eosinophil % 2.5 %      Basophil % 1.0 %      Immature Grans % 5.4 %      Neutrophils, Absolute 5.34 10*3/mm3      Lymphocytes, Absolute 2.59 10*3/mm3      Monocytes, Absolute 0.79 10*3/mm3      Eosinophils, Absolute 0.24 10*3/mm3      Basophils, Absolute 0.10 10*3/mm3      Immature Grans, Absolute 0.52 10*3/mm3      nRBC 0.3 /100 WBC     POC Glucose Once [[id number]]  (Abnormal) Collected: 04/05/21 2033    Specimen: Blood Updated: 04/05/21 2128     Glucose 395 mg/dL      Comment: RN NotifiedOperator: [id number] MARIO HOROWITZLEYMeter ID: [id number]       POC Glucose Once [[id number]]  (Abnormal) Collected: 04/05/21 1636    Specimen: Blood Updated: 04/05/21 1804     Glucose 200 mg/dL      Comment: RN NotifiedOperator: [id number] LARISSA BRANDYMeter ID: [id number]       POC Glucose Once [[id number]]  (Abnormal) Collected: 04/03/21 2000    Specimen: Blood Updated: 04/05/21 1510     Glucose 443 mg/dL      Comment: Result Not ConfirmedOperator: [id number] HADLEY MERARITAMeter ID: [id number]        POC Glucose Once [[id number]]  (Abnormal) Collected: 04/03/21 2000    Specimen: Blood Updated: 04/05/21 1510     Glucose 427 mg/dL      Comment: : [id number] HADLEY ELRITAMeter ID: [id number]       POC Glucose Once [[id number]]  (Abnormal) Collected: 04/03/21 1722    Specimen: Blood Updated: 04/05/21 1509     Glucose 362 mg/dL      Comment: Sliding Scale AdminOperator: [id number] PURA JENNIFERMeter ID: [id number]              Imaging Results (Last 24 Hours)     ** No results found for the last 24 hours. **          I reviewed the patient's new clinical results.    Assessment/Plan:     Active Hospital Problems    Diagnosis    • **Type 2 diabetes mellitus with hyperosmolar hyperglycemic state (HHS) (CMS/[facility])      -IV fluids  - Glucose monitoring  -Mod/High dose SSI  -Levemir 30u bid, Aspart 4 units w/meals     • MRSA infection    • Cutaneous candidiasis      - Nystatin     • Community acquired pneumonia of right middle lobe of lung      - Confirmed on CXR. CXR 4/5 unremarkable   -Aspiration pneumonitis now suspected.  Patient MRSA positive in nares.  -IV clindamycin 300 mg 3 times daily finish out 10-day course of antibiotic treatment. Started 4/3/21. Switched to Clindamycin 600mg q8h 4/5/21     • Metabolic acidosis      -Underlying HHS  -No anion gap     • Hyponatremia      -Monitor and replace as needed     • Hyperkalemia      -We will monitor and replace         • Hypocalcemia      - Will continue to monitor     • Anemia      -Iron studies indicative of mixed iron deficiency and chronic disease  -Continue home ferrous sulfate       • Acute cystitis      -IV Rocephin 3-day course  -Urine culture negative     • Acute renal failure superimposed on chronic kidney disease (CMS/[facility])      -Hold nephrotoxic drugs  -Nephrology consulted and appreciate recommendations     • Chronic obstructive lung disease (CMS/[facility])      -O2 supplementation  -Home inhalers as needed  -DuoNebs  -CPAP at night     • GERD  (gastroesophageal reflux disease)      - IV protonix qday     • Coronary artery disease      -Continue home meds     • Current smoker      -Patch           DVT prophylaxis: Heparin  Code Status and Medical Interventions:   Ordered at: 03/31/21 1811     Code Status:    CPR     Medical Interventions (Level of Support Prior to Arrest):    Full       Plan for disposition:Where: rehab and current living arrangements and When:  2-3 days      Time: 15 min           This document has been electronically signed by Nirmal Calvillo MD on April 6, 2021 09:11 CDT

## 2021-04-06 NOTE — THERAPY TREATMENT NOTE
Acute Care - Physical Therapy Treatment Note  [facility]     Patient Name: Yamileth Slater  : 1960  MRN: [id number]  Today's Date: 2021           PT Assessment (last 12 hours)      PT Evaluation and Treatment     Row Name 21 1325          Physical Therapy Time and Intention    Subjective Information  complains of;pain  -LN     Document Type  therapy note (daily note)  -LN     Mode of Treatment  physical therapy;individual therapy  -LN     Row Name 21 1325          General Information    Patient Profile Reviewed  yes  -LN     Existing Precautions/Restrictions  fall R wrist precautions from possible fx hx per chart  -LN     Row Name 21 1325          Cognition    Orientation Status (Cognition)  oriented to;person;place;time;situation able to answer but slow in response time and detail  -     Row Name 21 1325          Pain Scale: Numbers Pre/Post-Treatment    Pretreatment Pain Rating  6/10  -LN     Posttreatment Pain Rating  8/10  -LN     Pain Location  -- right butt cheek  -LN     Pre/Posttreatment Pain Comment  nsg gave meds  -LN     Row Name 21 1325          Bed Mobility    Bed Mobility  bed mobility (all) activities  -LN     All Activities, [place] (Bed Mobility)  minimum assist (75% patient effort);moderate assist (50% patient effort)  -LN     Rolling Right [place] (Bed Mobility)  not tested  -LN     Scooting/Bridging [place] (Bed Mobility)  not tested  -LN     Supine-Sit [place] (Bed Mobility)  not tested  -LN     Sit-Supine [place] (Bed Mobility)  not tested  -LN     Row Name 21 1325          Transfers    Bed-Chair [place] (Transfers)  not tested  -LN     Chair-Bed [place] (Transfers)  not tested  -LN     Assistive Device (Bed-Chair Transfers)  walker, front-wheeled  -LN     Sit-Stand [place] (Transfers)  minimum assist (75% patient effort);1 person assist;verbal cues  -LN     Stand-Sit [place] (Transfers)   minimum assist (75% patient effort);1 person assist;verbal cues  -LN     [place] Level (Toilet Transfer)  minimum assist (75% patient effort)  -LN     Assistive Device (Toilet Transfer)  walker, front-wheeled;commode  -LN     Row Name 04/06/21 1325          Sit-Stand Transfer    Assistive Device (Sit-Stand Transfers)  walker, front-wheeled  -LN     Row Name 04/06/21 1325          Stand-Sit Transfer    Assistive Device (Stand-Sit Transfers)  walker, front-wheeled  -LN     Row Name 04/06/21 1325          Toilet Transfer    Type (Toilet Transfer)  sit-stand;stand-sit  -LN     Row Name 04/06/21 1325          Gait/Stairs (Locomotion)    [place] Level (Gait)  minimum assist (75% patient effort);1 person assist  -LN     Assistive Device (Gait)  walker, front-wheeled  -LN     Distance in Feet (Gait)  22,38  -LN     Deviations/Abnormal Patterns (Gait)  stride length decreased;gait speed decreased;base of support, wide  -     Row Name 04/06/21 1325          Safety Issues, Functional Mobility    Impairments Affecting Function (Mobility)  cognition;balance;coordination;endurance/activity tolerance;sensation/sensory awareness;strength  -     Row Name 04/06/21 1325          Hip (Therapeutic Exercise)    Hip (Therapeutic Exercise)  AROM (active range of motion)  -LN     Hip AROM (Therapeutic Exercise)  bilateral;flexion;aBduction;aDduction  -     Row Name 04/06/21 1325          Knee (Therapeutic Exercise)    Knee (Therapeutic Exercise)  AROM (active range of motion)  -LN     Knee AROM (Therapeutic Exercise)  bilateral;LAQ (long arc quad) vc's to fully extend knee  -     Row Name 04/06/21 1325          Ankle (Therapeutic Exercise)    Ankle (Therapeutic Exercise)  AROM (active range of motion)  -LN     Ankle AROM (Therapeutic Exercise)  bilateral;dorsiflexion;plantarflexion  -     Row Name 04/06/21 1325          Plan of Care Review    Plan of Care Reviewed With  patient  -LN     Outcome Summary  pt has signed  papers for no alarms;sit-stand-sit min of 1 and vc's for hand placement,amb 22,38' with rw and min of 1;20 reps seated ex-several rest breaks this rx  -LN     Row Name 04/06/21 1325          Vital Signs    Pre Systolic BP Rehab  120  -LN     Pre Treatment Diastolic BP  54  -LN     Post Systolic BP Rehab  117  -LN     Post Treatment Diastolic BP  53  -LN     Pretreatment Heart Rate (beats/min)  72  -LN     Posttreatment Heart Rate (beats/min)  72  -LN     Pre SpO2 (%)  95  -LN     O2 Delivery Pre Treatment  room air  -LN     Post SpO2 (%)  95  -LN     Pre Patient Position  Sitting  -LN     Intra Patient Position  Standing  -LN     Post Patient Position  Sitting  -LN     Row Name 04/06/21 1325          Bed Mobility Goal 1 (PT)    Activity/Assistive Device (Bed Mobility Goal 1, PT)  bed mobility activities, all  -LN     [place] Level/Cues Needed (Bed Mobility Goal 1, PT)  moderate assist (50-74% patient effort);minimum assist (75% or more patient effort);1 person assist  -LN     Time Frame (Bed Mobility Goal 1, PT)  1 week  -LN     Progress/Outcomes (Bed Mobility Goal 1, PT)  goal partially met;continuing progress toward goal  -LN     Row Name 04/06/21 1325          Transfer Goal 1 (PT)    Activity/Assistive Device (Transfer Goal 1, PT)  bed-to-chair/chair-to-bed  -LN     [place] Level/Cues Needed (Transfer Goal 1, PT)  minimum assist (75% or more patient effort);moderate assist (50-74% patient effort);2 person assist  -LN     Progress/Outcome (Transfer Goal 1, PT)  continuing progress toward goal;goal met  -LN     Row Name 04/06/21 1325          Gait Training Goal 1 (PT)    Activity/Assistive Device (Gait Training Goal 1, PT)  gait (walking locomotion);assistive device use  -LN     [place] Level (Gait Training Goal 1, PT)  moderate assist (50-74% patient effort);minimum assist (75% or more patient effort);2 person assist  -LN     Distance (Gait Training Goal 1, PT)  100 ft or more  -LN     Time Frame  (Gait Training Goal 1, PT)  2 weeks  -LN     Progress/Outcome (Gait Training Goal 1, PT)  continuing progress toward goal;goal partially met  -LN     Row Name 04/06/21 1325          ROM Goal 1 (PT)    ROM Goal 1 (PT)  NOEL MCINTYREL activley for hip, knee and ankle flx ex 20 reps x 2  -LN     Time Frame (ROM Goal 1, PT)  4 days  -LN     Progress/Outcome (ROM Goal 1, PT)  goal not met  -LN     Row Name 04/06/21 1325          Stairs Goal 1 (PT)    Activity/Assistive Device (Stairs Goal 1, PT)  ascending stairs;descending stairs  -LN     [place] Level/Cues Needed (Stairs Goal 1, PT)  modified independence  -LN     Number of Stairs (Stairs Goal 1, PT)  2 or more as lopez;  -LN     Time Frame (Stairs Goal 1, PT)  long term goal (LTG);3 weeks  -LN     Progress/Outcome (Stairs Goal 1, PT)  goal not met  -LN     Row Name 04/06/21 1325          Positioning and Restraints    Post Treatment Position  chair  -LN     In Chair  notified nsg;sitting;with family/caregiver  -LN       User Key  (r) = Recorded By, (t) = Taken By, (c) = Cosigned By    Initials Name Provider Type    LN Lor Tavarez PTA Physical Therapy Assistant        Physical Therapy Education                 Title: PT OT SLP Therapies (In Progress)     Topic: Physical Therapy (In Progress)     Point: Mobility training (Done)     Learning Progress Summary           Patient Acceptance, E,TB, VU,NR,DU by LN at 4/6/2021 1430    Comment: home safety,room safety    Acceptance, D,E, NR by GB at 4/1/2021 1056    Comment: POC: instructions for eval/ex to encourage active  mobility   Family Acceptance, E,TB, VU,NR,DU by LN at 4/6/2021 1430    Comment: home safety,room safety                   Point: Home exercise program (Not Started)     Learner Progress:  Not documented in this visit.          Point: Body mechanics (Done)     Learning Progress Summary           Patient Acceptance, E,TB, VU,NR,DU by LN at 4/6/2021 1430    Comment: home safety,room safety   Family Acceptance,  E,TB, VU,NR,DU by LN at 4/6/2021 1430    Comment: home safety,room safety                   Point: Precautions (Done)     Learning Progress Summary           Patient Acceptance, E,TB, VU,NR,DU by LN at 4/6/2021 1430    Comment: home safety,room safety   Family Acceptance, E,TB, VU,NR,DU by LN at 4/6/2021 1430    Comment: home safety,room safety                               User Key     Initials Effective Dates Name Provider Type Discipline     04/03/18 -  Gauri Anderson, PT Physical Therapist PT    LN 03/07/18 -  Lor Tavarez PTA Physical Therapy Assistant PT              PT Recommendation and Plan  Therapy Frequency (PT):  (5-7 d/w)  Plan of Care Reviewed With: patient  Outcome Summary: pt has signed papers for no alarms;sit-stand-sit min of 1 and vc's for hand placement,amb 22,38' with rw and min of 1;20 reps seated ex-several rest breaks this rx       Time Calculation:   PT Charges     Row Name 04/06/21 1429             Time Calculation    Start Time  1325  -LN      Stop Time  1420  -LN      Time Calculation (min)  55 min  -LN      PT Received On  04/06/21  -LN         Time Calculation- PT    Total Timed Code Minutes- PT  55 minute(s)  -LN        User Key  (r) = Recorded By, (t) = Taken By, (c) = Cosigned By    Initials Name Provider Type    LN Lor Tavarez PTA Physical Therapy Assistant        Therapy Charges for Today     Code Description Service Date Service Provider Modifiers Qty    19461810481 HC GAIT TRAINING EA 15 MIN 4/5/2021 Lor Tavarez, PTA GP 1    62241979128 HC PT THER PROC EA 15 MIN 4/5/2021 Lor Tavarez, PTA GP 1    95165213505 HC PT THERAPEUTIC ACT EA 15 MIN 4/6/2021 Lor Tavarez, PTA GP 1    74030255990 HC GAIT TRAINING EA 15 MIN 4/6/2021 Lor Tavarez, PTA GP 1    45022058685 HC PT THER PROC EA 15 MIN 4/6/2021 Lor Tavarez, PTA GP 1    74022618942 HC PT SELF CARE/MGMT/TRAIN EA 15 MIN 4/6/2021 Corby, Lor S, PTA GP 1          PT G-Codes  Outcome Measure Options: AM-PAC 6  Clicks Daily Activity (OT)  AM-PAC 6 Clicks Score (PT): 14  AM-PAC 6 Clicks Score (OT): 15    Lor Tavarez, PTA  4/6/2021

## 2021-04-06 NOTE — NURSING NOTE
Dr. Solano made aware of pt blood sugar of 425. Dr. Solano states to follow the sliding scale and give 24 units at 0730.

## 2021-04-06 NOTE — PLAN OF CARE
Goal Outcome Evaluation:  Plan of Care Reviewed With: patient     Outcome Summary: pt has signed papers for no alarms;sit-stand-sit min of 1 and vc's for hand placement,amb 22,38' with rw and min of 1;20 reps seated ex-several rest breaks this rx

## 2021-04-06 NOTE — THERAPY TREATMENT NOTE
Patient Name: Yamileth Slater  : 1960    MRN: [id number]                              Today's Date: 2021       Admit Date: 3/31/2021    Visit Dx:     ICD-10-CM ICD-9-CM   1. Hyperglycemia  R73.9 790.29   2. Urinary tract infection in female  N39.0 599.0   3. Impaired mobility and ADLs  Z74.09 V49.89    Z78.9    4. Impaired functional mobility, balance, gait, and endurance  Z74.09 V49.89     Patient Active Problem List   Diagnosis   • Type 2 diabetes mellitus with diabetic polyneuropathy, with long-term current use of insulin (CMS/[facility])   • Chronic obstructive pulmonary disease (CMS/[facility])   • Low back pain   • Vitamin D deficiency   • Type 2 diabetes mellitus (CMS/[facility])   • Tobacco dependence syndrome   • Surgical follow-up care   • Shoulder joint pain   • Peripheral vascular disease (CMS/[facility])   • Pain   • Neurologic disorder associated with diabetes mellitus (CMS/[facility])   • Morbid obesity with BMI of 50.0-59.9, adult (CMS/[facility])   • Mixed hyperlipidemia   • Kidney stone   • History of colon polyps   • GERD (gastroesophageal reflux disease)   • Excoriated eczema   • Essential hypertension   • Encounter for medication refill   • Dyslipidemia   • Diabetes mellitus (CMS/[facility])   • Coronary artery disease   • Chronic obstructive lung disease (CMS/[facility])   • Chronic folliculitis   • Chest pain   • Mild persistent asthma   • Anxiety states   • 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   • Uncontrolled type 2 diabetes mellitus with complication, with long-term current use of insulin (CMS/[facility])   • Acute renal failure superimposed on chronic kidney disease (CMS/[facility])   • Anemia   • Hypothyroidism   • Carotid artery disease (CMS/[facility])   • Current smoker   • DM type 2 with diabetic peripheral neuropathy (CMS/[facility])   • [place] abuse   • PAD (peripheral artery disease) (CMS/[facility])   • Pneumonia of both lungs due to infectious organism   • S/P CABG x 3   • Intercostal pain   • Venous insufficiency   • Dyspnea on  exertion   • LAFB (left anterior fascicular block)   • Pulmonary hypertension (CMS/HCC)   • Left hip pain   • Neck pain   • Stage 3b chronic kidney disease (CMS/HCC)   • MIKE and COPD overlap syndrome (CMS/[facility])   • Pneumonia due to COVID-19 virus   • CKD (chronic kidney disease) stage 4, GFR 15-29 ml/min (CMS/HCC)   • Morbid obesity (CMS/HCC)   • Type 2 diabetes mellitus with hyperosmolar hyperglycemic state (HHS) (CMS/[facility])   • Metabolic acidosis   • Hyponatremia   • Hyperkalemia   • Hypocalcemia   • Anemia   • Acute cystitis   • Cutaneous candidiasis   • Community acquired pneumonia of right middle lobe of lung   • MRSA infection     Past Medical History:   Diagnosis Date   • Anxiety    • Carotid artery stenosis    • Chronic obstructive lung disease (CMS/[facility])    • CKD (chronic kidney disease) stage 4, GFR 15-29 ml/min (CMS/[facility])    • Colonic polyp    • Coronary arteriosclerosis    • Diabetes mellitus (CMS/HCC)    • Diabetic neuropathy (CMS/[facility])    • GERD (gastroesophageal reflux disease)    • Hypercholesterolemia    • Hypertension    • Morbid obesity (CMS/[facility])    • Nephrolithiasis    • Peripheral vascular disease (CMS/[facility])    • Sleep apnea    • Substance abuse (CMS/[facility])    • Vitamin D deficiency      Past Surgical History:   Procedure Laterality Date   • CARDIAC CATHETERIZATION N/A 7/14/2020   • CAROTID STENT Left    • COLONOSCOPY     • CORONARY ARTERY BYPASS GRAFT     • CYSTOSCOPY BLADDER STONE LITHOTRIPSY Bilateral      General Information     Row Name 04/06/21 1240          OT Time and Intention    Document Type  therapy note (daily note)  -BB     Mode of Treatment  individual therapy;occupational therapy  -BB     Row Name 04/06/21 1240          General Information    Patient Profile Reviewed  yes  -BB     Existing Precautions/Restrictions  fall R wrist precautions from possible fx hx per chart  -BB     Row Name 04/06/21 1240          Cognition    Orientation Status (Cognition)  oriented  to;person;place;time;situation able to answer but slow in response time and detail  -     Row Name 04/06/21 1240          Safety Issues, Functional Mobility    Impairments Affecting Function (Mobility)  cognition;balance;coordination;endurance/activity tolerance;sensation/sensory awareness;strength  -       User Key  (r) = Recorded By, (t) = Taken By, (c) = Cosigned By    Initials Name Provider Type    BB Nissa Bey COTA/L Occupational Therapy Assistant          Mobility/ADL's     Row Name 04/06/21 1240          Bed Mobility    Rolling Right [place] (Bed Mobility)  not tested  -BB     Scooting/Bridging [place] (Bed Mobility)  not tested  -BB     Supine-Sit [place] (Bed Mobility)  not tested  -BB     Sit-Supine [place] (Bed Mobility)  not tested  -BB     Bed Mobility, Safety Issues  impaired trunk control for bed mobility  -     Assistive Device (Bed Mobility)  bed rails;head of bed elevated;draw sheet  -     Row Name 04/06/21 1240          Transfers    Transfers  sit-stand transfer;bed-chair transfer  -     Bed-Chair [place] (Transfers)  not tested  -     Sit-Stand [place] (Transfers)  minimum assist (75% patient effort);1 person assist;verbal cues  -     Row Name 04/06/21 1240          Sit-Stand Transfer    Assistive Device (Sit-Stand Transfers)  walker, front-wheeled  -     Row Name 04/06/21 1240          Toilet Transfer    Type (Toilet Transfer)  sit-stand;stand-sit  -     Row Name 04/06/21 1240          Mobility    Extremity Weight-bearing Status  right upper extremity  -     Right Upper Extremity (Weight-bearing Status)  non weight-bearing (NWB) no ROM non weight bear wrist; pt reports she took her cast off her arm RN to discuss with MD  -JO     Row Name 04/06/21 1240          Grooming Assessment/Training    [place] Level (Grooming)  oral care regimen;wash face, hands;hair care, combing/brushing;supervision;set up  -BB     Position (Grooming)  --  sitting in chair  -BB       User Key  (r) = Recorded By, (t) = Taken By, (c) = Cosigned By    Initials Name Provider Type    Nissa Guillermo COTA/L Occupational Therapy Assistant        Obj/Interventions     Row Name 04/06/21 1240          Vision Assessment/Intervention    Visual Impairment/Limitations  corrective lenses for reading hearing WFL  -BB     Row Name 04/06/21 1240          Shoulder (Therapeutic Exercise)    Shoulder Strengthening (Therapeutic Exercise)  bilateral;flexion;extension;1 lb free weight;horizontal aBduction/aDduction;sitting;2 sets x15 reps, wt to L UE only  -BB     Row Name 04/06/21 1240          Elbow/Forearm (Therapeutic Exercise)    Elbow/Forearm (Therapeutic Exercise)  AROM (active range of motion)  -BB     Elbow/Forearm AROM (Therapeutic Exercise)  bilateral;flexion;extension;2 sets x15 reps  -BB     Row Name 04/06/21 1240          Wrist (Therapeutic Exercise)    Wrist Strengthening (Therapeutic Exercise)  bilateral;flexion;extension;2 sets x15 reps  -BB     Row Name 04/06/21 1240          Hand (Therapeutic Exercise)    Hand (Therapeutic Exercise)  AROM (active range of motion)  -BB     Hand AROM/AAROM (Therapeutic Exercise)  bilateral;finger flexion;finger extension;2 sets x15 reps  -BB     Row Name 04/06/21 1240          Therapeutic Exercise    Therapeutic Exercise  shoulder;elbow/forearm;wrist;hand  -BB       User Key  (r) = Recorded By, (t) = Taken By, (c) = Cosigned By    Initials Name Provider Type    Nissa Guillermo COTA/L Occupational Therapy Assistant        Goals/Plan     Row Name 04/06/21 1240          Transfer Goal 1 (OT)    Activity/Assistive Device (Transfer Goal 1, OT)  toilet;commode, bedside with drop arms;commode, bedside without drop arms  -BB     [place] Level/Cues Needed (Transfer Goal 1, OT)  minimum assist (75% or more patient effort)  -BB     Time Frame (Transfer Goal 1, OT)  long term goal (LTG);by discharge  -BB     Progress/Outcome  (Transfer Goal 1, OT)  goal not met  -BB     Row Name 04/06/21 1240          Bathing Goal 1 (OT)    Activity/Device (Bathing Goal 1, OT)  bathing skills, all  -BB     [place] Level/Cues Needed (Bathing Goal 1, OT)  set-up required;standby assist;verbal cues required  -BB     Time Frame (Bathing Goal 1, OT)  long term goal (LTG);by discharge  -BB     Progress/Outcomes (Bathing Goal 1, OT)  goal not met  -BB     Row Name 04/06/21 1240          Dressing Goal 1 (OT)    Activity/Device (Dressing Goal 1, OT)  dressing skills, all  -BB     [place]/Cues Needed (Dressing Goal 1, OT)  set-up required;standby assist;verbal cues required  -BB     Time Frame (Dressing Goal 1, OT)  long term goal (LTG);by discharge  -BB     Progress/Outcome (Dressing Goal 1, OT)  goal not met  -BB       User Key  (r) = Recorded By, (t) = Taken By, (c) = Cosigned By    Initials Name Provider Type    BB Nissa Bey COTA/L Occupational Therapy Assistant        Clinical Impression     Row Name 04/06/21 1240          Pain Scale: Numbers Pre/Post-Treatment    Pretreatment Pain Rating  0/10 - no pain  -BB     Posttreatment Pain Rating  0/10 - no pain  -BB     Row Name 04/06/21 1240          Plan of Care Review    Plan of Care Reviewed With  patient  -BB     Progress  no change  -BB     Outcome Summary  Pt sitting in chair and agrees to B UE exercises with 1 lb wt to L UE 2 sets x 15 reps with RBs as needed, no wt R UE. Pt educated on home safety/fall prevention. All needs within reach following oT tx.  -BB     Row Name 04/06/21 1240          Therapy Assessment/Plan (OT)    Rehab Potential (OT)  fair, will monitor progress closely  -BB     Criteria for Skilled Therapeutic Interventions Met (OT)  yes;meets criteria  -BB     Therapy Frequency (OT)  other (see comments) 5-7 days a week  -BB     Row Name 04/06/21 1240          Therapy Plan Review/Discharge Plan (OT)    Anticipated Discharge Disposition (OT)  inpatient rehabilitation  facility;skilled nursing facility;home with 24/7 care;home with home health  -BB     Row Name 04/06/21 1240          Vital Signs    Pretreatment Heart Rate (beats/min)  71  -BB     Posttreatment Heart Rate (beats/min)  73  -BB     Pre SpO2 (%)  96  -BB     O2 Delivery Pre Treatment  room air  -BB     Post SpO2 (%)  95  -BB     O2 Delivery Post Treatment  room air  -BB     Pre Patient Position  Sitting  -BB     Post Patient Position  Sitting  -BB     Row Name 04/06/21 1240          Positioning and Restraints    Pre-Treatment Position  sitting in chair/recliner  -BB     Post Treatment Position  chair  -BB     In Chair  sitting;call light within reach;encouraged to call for assist;with family/caregiver  -BB       User Key  (r) = Recorded By, (t) = Taken By, (c) = Cosigned By    Initials Name Provider Type    Nissa Guillermo COTA/L Occupational Therapy Assistant        Outcome Measures     Row Name 04/06/21 1240          How much help from another is currently needed...    Putting on and taking off regular lower body clothing?  2  -BB     Bathing (including washing, rinsing, and drying)  2  -BB     Toileting (which includes using toilet bed pan or urinal)  2  -BB     Putting on and taking off regular upper body clothing  2  -BB     Taking care of personal grooming (such as brushing teeth)  3  -BB     Eating meals  4  -BB     AM-PAC 6 Clicks Score (OT)  15  -BB       User Key  (r) = Recorded By, (t) = Taken By, (c) = Cosigned By    Initials Name Provider Type    Nissa Guillermo COTA/L Occupational Therapy Assistant        Occupational Therapy Education                 Title: PT OT SLP Therapies (In Progress)     Topic: Occupational Therapy (In Progress)     Point: ADL training (In Progress)     Description:   Instruct learner(s) on proper safety adaptation and remediation techniques during self care or transfers.   Instruct in proper use of assistive devices.              Learning Progress Summary            Patient Acceptance, E, NR by  at 4/4/2021 1309    Acceptance, E, NR by  at 4/2/2021 1223    Comment: Educated about OT and POC. Educated on safety throughout, educated on precuations with right wrist. educated to call for assist.   Family Acceptance, E, NR by  at 4/2/2021 1223    Comment: Educated about OT and POC. Educated on safety throughout, educated on precuations with right wrist. educated to call for assist.                   Point: Home exercise program (Done)     Description:   Instruct learner(s) on appropriate technique for monitoring, assisting and/or progressing therapeutic exercises/activities.              Learning Progress Summary           Patient Acceptance, E, VU by  at 4/6/2021 1450                   Point: Precautions (In Progress)     Description:   Instruct learner(s) on prescribed precautions during self-care and functional transfers.              Learning Progress Summary           Patient Acceptance, E, NR by  at 4/2/2021 1223    Comment: Educated about OT and POC. Educated on safety throughout, educated on precuations with right wrist. educated to call for assist.   Family Acceptance, E, NR by  at 4/2/2021 1223    Comment: Educated about OT and POC. Educated on safety throughout, educated on precuations with right wrist. educated to call for assist.                   Point: Body mechanics (Not Started)     Description:   Instruct learner(s) on proper positioning and spine alignment during self-care, functional mobility activities and/or exercises.              Learner Progress:  Not documented in this visit.                      User Key     Initials Effective Dates Name Provider Type Discipline     06/08/18 -  Mary Jo Michel OTR/L Occupational Therapist OT     03/07/18 -  Nissa Bey COTA/L Occupational Therapy Assistant OT              OT Recommendation and Plan  Therapy Frequency (OT): other (see comments) (5-7 days a week)  Plan of Care Review  Plan of Care  Reviewed With: patient  Progress: no change  Outcome Summary: Pt sitting in chair and agrees to B UE exercises with 1 lb wt to L UE 2 sets x 15 reps with RBs as needed, no wt R UE. Pt educated on home safety/fall prevention. All needs within reach following oT tx.     Time Calculation:   Time Calculation- OT     Row Name 04/06/21 1451             Time Calculation- OT    OT Start Time  1240  -BB      OT Stop Time  1320  -BB      OT Time Calculation (min)  40 min  -BB      Total Timed Code Minutes- OT  40 minute(s)  -BB      OT Received On  04/06/21  -BB        User Key  (r) = Recorded By, (t) = Taken By, (c) = Cosigned By    Initials Name Provider Type    Nissa Guillermo COTA/L Occupational Therapy Assistant        Therapy Charges for Today     Code Description Service Date Service Provider Modifiers Qty    [id number] HC OT SELF CARE/MGMT/TRAIN EA 15 MIN 4/6/2021 Nissa Bey COTA/L GO 1    [id number]  OT THER PROC EA 15 MIN 4/6/2021 Nissa Bey COTA/L GO 2               DARBY Mcgovern  4/6/2021

## 2021-04-06 NOTE — PLAN OF CARE
Goal Outcome Evaluation:  Plan of Care Reviewed With: patient  Progress: no change   No changes. Vss.

## 2021-04-07 LAB
ALBUMIN SERPL-MCNC: 2.8 G/DL (ref 3.5–5.2)
ALBUMIN/GLOB SERPL: 0.7 G/DL
ALP SERPL-CCNC: 189 U/L (ref 39–117)
ALT SERPL W P-5'-P-CCNC: 12 U/L (ref 1–33)
ANION GAP SERPL CALCULATED.3IONS-SCNC: 7 MMOL/L (ref 5–15)
AST SERPL-CCNC: 17 U/L (ref 1–32)
BASOPHILS # BLD AUTO: 0.1 10*3/MM3 (ref 0–0.2)
BASOPHILS NFR BLD AUTO: 0.9 % (ref 0–1.5)
BILIRUB SERPL-MCNC: <0.2 MG/DL (ref 0–1.2)
BUN SERPL-MCNC: 31 MG/DL (ref 8–23)
BUN/CREAT SERPL: 14.1 (ref 7–25)
CALCIUM SPEC-SCNC: 8.4 MG/DL (ref 8.6–10.5)
CHLORIDE SERPL-SCNC: 105 MMOL/L (ref 98–107)
CO2 SERPL-SCNC: 22 MMOL/L (ref 22–29)
CREAT SERPL-MCNC: 2.2 MG/DL (ref 0.57–1)
DEPRECATED RDW RBC AUTO: 48.3 FL (ref 37–54)
EOSINOPHIL # BLD AUTO: 0.34 10*3/MM3 (ref 0–0.4)
EOSINOPHIL NFR BLD AUTO: 3 % (ref 0.3–6.2)
ERYTHROCYTE [DISTWIDTH] IN BLOOD BY AUTOMATED COUNT: 15.3 % (ref 12.3–15.4)
GFR SERPL CREATININE-BSD FRML MDRD: 23 ML/MIN/1.73
GLOBULIN UR ELPH-MCNC: 4.1 GM/DL
GLUCOSE BLDC GLUCOMTR-MCNC: 151 MG/DL (ref 70–130)
GLUCOSE BLDC GLUCOMTR-MCNC: 290 MG/DL (ref 70–130)
GLUCOSE BLDC GLUCOMTR-MCNC: 348 MG/DL (ref 70–130)
GLUCOSE SERPL-MCNC: 287 MG/DL (ref 65–99)
HCT VFR BLD AUTO: 31 % (ref 34–46.6)
HGB BLD-MCNC: 9.8 G/DL (ref 12–15.9)
IMM GRANULOCYTES # BLD AUTO: 0.59 10*3/MM3 (ref 0–0.05)
IMM GRANULOCYTES NFR BLD AUTO: 5.2 % (ref 0–0.5)
LYMPHOCYTES # BLD AUTO: 3.38 10*3/MM3 (ref 0.7–3.1)
LYMPHOCYTES NFR BLD AUTO: 29.9 % (ref 19.6–45.3)
MCH RBC QN AUTO: 28.3 PG (ref 26.6–33)
MCHC RBC AUTO-ENTMCNC: 31.6 G/DL (ref 31.5–35.7)
MCV RBC AUTO: 89.6 FL (ref 79–97)
MONOCYTES # BLD AUTO: 0.95 10*3/MM3 (ref 0.1–0.9)
MONOCYTES NFR BLD AUTO: 8.4 % (ref 5–12)
NEUTROPHILS NFR BLD AUTO: 5.93 10*3/MM3 (ref 1.7–7)
NEUTROPHILS NFR BLD AUTO: 52.6 % (ref 42.7–76)
NRBC BLD AUTO-RTO: 0.3 /100 WBC (ref 0–0.2)
PLATELET # BLD AUTO: 326 10*3/MM3 (ref 140–450)
PMV BLD AUTO: 9.3 FL (ref 6–12)
POTASSIUM SERPL-SCNC: 4 MMOL/L (ref 3.5–5.2)
PROT SERPL-MCNC: 6.9 G/DL (ref 6–8.5)
RBC # BLD AUTO: 3.46 10*6/MM3 (ref 3.77–5.28)
SODIUM SERPL-SCNC: 134 MMOL/L (ref 136–145)
WBC # BLD AUTO: 11.29 10*3/MM3 (ref 3.4–10.8)

## 2021-04-07 PROCEDURE — 97116 GAIT TRAINING THERAPY: CPT

## 2021-04-07 PROCEDURE — 63710000001 INSULIN ASPART PER 5 UNITS: Performed by: STUDENT IN AN ORGANIZED HEALTH CARE EDUCATION/TRAINING PROGRAM

## 2021-04-07 PROCEDURE — 25010000002 HEPARIN (PORCINE) PER 1000 UNITS: Performed by: CHIROPRACTOR

## 2021-04-07 PROCEDURE — 82962 GLUCOSE BLOOD TEST: CPT

## 2021-04-07 PROCEDURE — 97530 THERAPEUTIC ACTIVITIES: CPT

## 2021-04-07 PROCEDURE — 63710000001 INSULIN DETEMIR PER 5 UNITS: Performed by: STUDENT IN AN ORGANIZED HEALTH CARE EDUCATION/TRAINING PROGRAM

## 2021-04-07 PROCEDURE — 85025 COMPLETE CBC W/AUTO DIFF WBC: CPT | Performed by: CHIROPRACTOR

## 2021-04-07 PROCEDURE — 97535 SELF CARE MNGMENT TRAINING: CPT

## 2021-04-07 PROCEDURE — 94799 UNLISTED PULMONARY SVC/PX: CPT

## 2021-04-07 PROCEDURE — 97110 THERAPEUTIC EXERCISES: CPT

## 2021-04-07 PROCEDURE — 99232 SBSQ HOSP IP/OBS MODERATE 35: CPT | Performed by: STUDENT IN AN ORGANIZED HEALTH CARE EDUCATION/TRAINING PROGRAM

## 2021-04-07 PROCEDURE — 63710000001 ONDANSETRON ODT 4 MG TABLET DISPERSIBLE: Performed by: CHIROPRACTOR

## 2021-04-07 PROCEDURE — 80053 COMPREHEN METABOLIC PANEL: CPT | Performed by: CHIROPRACTOR

## 2021-04-07 RX ORDER — TRAMADOL HYDROCHLORIDE 50 MG/1
50 TABLET ORAL EVERY 8 HOURS PRN
Status: DISCONTINUED | OUTPATIENT
Start: 2021-04-07 | End: 2021-04-12 | Stop reason: HOSPADM

## 2021-04-07 RX ORDER — CALCIUM CARBONATE 200(500)MG
2 TABLET,CHEWABLE ORAL 2 TIMES DAILY PRN
Status: DISCONTINUED | OUTPATIENT
Start: 2021-04-07 | End: 2021-04-12 | Stop reason: HOSPADM

## 2021-04-07 RX ORDER — GABAPENTIN 400 MG/1
800 CAPSULE ORAL 3 TIMES DAILY
Status: DISCONTINUED | OUTPATIENT
Start: 2021-04-07 | End: 2021-04-08

## 2021-04-07 RX ADMIN — NYSTATIN: 100000 POWDER TOPICAL at 21:42

## 2021-04-07 RX ADMIN — SODIUM CHLORIDE, PRESERVATIVE FREE 10 ML: 5 INJECTION INTRAVENOUS at 21:00

## 2021-04-07 RX ADMIN — SODIUM BICARBONATE 1300 MG: 650 TABLET ORAL at 08:13

## 2021-04-07 RX ADMIN — CALCIUM CARBONATE (ANTACID) CHEW TAB 500 MG 2 TABLET: 500 CHEW TAB at 11:21

## 2021-04-07 RX ADMIN — IPRATROPIUM BROMIDE AND ALBUTEROL SULFATE 3 ML: 2.5; .5 SOLUTION RESPIRATORY (INHALATION) at 07:42

## 2021-04-07 RX ADMIN — HEPARIN SODIUM 5000 UNITS: 5000 INJECTION INTRAVENOUS; SUBCUTANEOUS at 21:43

## 2021-04-07 RX ADMIN — ISOSORBIDE MONONITRATE 30 MG: 30 TABLET, EXTENDED RELEASE ORAL at 08:13

## 2021-04-07 RX ADMIN — INSULIN ASPART 4 UNITS: 100 INJECTION, SOLUTION INTRAVENOUS; SUBCUTANEOUS at 18:16

## 2021-04-07 RX ADMIN — ONDANSETRON 4 MG: 4 TABLET, ORALLY DISINTEGRATING ORAL at 07:56

## 2021-04-07 RX ADMIN — GABAPENTIN 800 MG: 400 CAPSULE ORAL at 15:56

## 2021-04-07 RX ADMIN — NYSTATIN: 100000 POWDER TOPICAL at 15:56

## 2021-04-07 RX ADMIN — POTASSIUM CHLORIDE 10 MEQ: 750 CAPSULE, EXTENDED RELEASE ORAL at 08:13

## 2021-04-07 RX ADMIN — OXYBUTYNIN CHLORIDE 5 MG: 5 TABLET, EXTENDED RELEASE ORAL at 08:12

## 2021-04-07 RX ADMIN — HEPARIN SODIUM 5000 UNITS: 5000 INJECTION INTRAVENOUS; SUBCUTANEOUS at 05:00

## 2021-04-07 RX ADMIN — METOPROLOL TARTRATE 100 MG: 50 TABLET, FILM COATED ORAL at 21:43

## 2021-04-07 RX ADMIN — ONDANSETRON 4 MG: 4 TABLET, ORALLY DISINTEGRATING ORAL at 21:58

## 2021-04-07 RX ADMIN — INSULIN ASPART 20 UNITS: 100 INJECTION, SOLUTION INTRAVENOUS; SUBCUTANEOUS at 11:16

## 2021-04-07 RX ADMIN — INSULIN DETEMIR 30 UNITS: 100 INJECTION, SOLUTION SUBCUTANEOUS at 08:15

## 2021-04-07 RX ADMIN — GABAPENTIN 400 MG: 400 CAPSULE ORAL at 08:13

## 2021-04-07 RX ADMIN — TRAMADOL HYDROCHLORIDE 50 MG: 50 TABLET, FILM COATED ORAL at 11:21

## 2021-04-07 RX ADMIN — HEPARIN SODIUM 5000 UNITS: 5000 INJECTION INTRAVENOUS; SUBCUTANEOUS at 13:36

## 2021-04-07 RX ADMIN — SODIUM CHLORIDE, PRESERVATIVE FREE 10 ML: 5 INJECTION INTRAVENOUS at 21:44

## 2021-04-07 RX ADMIN — INSULIN ASPART 16 UNITS: 100 INJECTION, SOLUTION INTRAVENOUS; SUBCUTANEOUS at 11:15

## 2021-04-07 RX ADMIN — CLOPIDOGREL BISULFATE 75 MG: 75 TABLET ORAL at 08:13

## 2021-04-07 RX ADMIN — NYSTATIN: 100000 POWDER TOPICAL at 08:12

## 2021-04-07 RX ADMIN — ATORVASTATIN CALCIUM 40 MG: 40 TABLET, FILM COATED ORAL at 08:12

## 2021-04-07 RX ADMIN — TRAMADOL HYDROCHLORIDE 50 MG: 50 TABLET, FILM COATED ORAL at 21:43

## 2021-04-07 RX ADMIN — IPRATROPIUM BROMIDE AND ALBUTEROL SULFATE 3 ML: 2.5; .5 SOLUTION RESPIRATORY (INHALATION) at 19:47

## 2021-04-07 RX ADMIN — INSULIN ASPART 12 UNITS: 100 INJECTION, SOLUTION INTRAVENOUS; SUBCUTANEOUS at 08:12

## 2021-04-07 RX ADMIN — CLINDAMYCIN HYDROCHLORIDE 600 MG: 150 CAPSULE ORAL at 06:05

## 2021-04-07 RX ADMIN — INSULIN ASPART 16 UNITS: 100 INJECTION, SOLUTION INTRAVENOUS; SUBCUTANEOUS at 08:13

## 2021-04-07 RX ADMIN — IPRATROPIUM BROMIDE AND ALBUTEROL SULFATE 3 ML: 2.5; .5 SOLUTION RESPIRATORY (INHALATION) at 10:33

## 2021-04-07 RX ADMIN — METOPROLOL TARTRATE 100 MG: 50 TABLET, FILM COATED ORAL at 08:13

## 2021-04-07 RX ADMIN — GABAPENTIN 800 MG: 400 CAPSULE ORAL at 21:42

## 2021-04-07 RX ADMIN — CLINDAMYCIN HYDROCHLORIDE 600 MG: 150 CAPSULE ORAL at 21:43

## 2021-04-07 RX ADMIN — INSULIN DETEMIR 35 UNITS: 100 INJECTION, SOLUTION SUBCUTANEOUS at 21:45

## 2021-04-07 RX ADMIN — ACETAMINOPHEN 650 MG: 325 TABLET, FILM COATED ORAL at 02:51

## 2021-04-07 RX ADMIN — SODIUM BICARBONATE 1300 MG: 650 TABLET ORAL at 21:59

## 2021-04-07 RX ADMIN — INSULIN ASPART 20 UNITS: 100 INJECTION, SOLUTION INTRAVENOUS; SUBCUTANEOUS at 18:16

## 2021-04-07 RX ADMIN — CLINDAMYCIN HYDROCHLORIDE 600 MG: 150 CAPSULE ORAL at 13:36

## 2021-04-07 RX ADMIN — MONTELUKAST SODIUM 10 MG: 10 TABLET, COATED ORAL at 21:42

## 2021-04-07 RX ADMIN — PANTOPRAZOLE SODIUM 40 MG: 40 TABLET, DELAYED RELEASE ORAL at 21:43

## 2021-04-07 NOTE — PLAN OF CARE
Goal Outcome Evaluation:  Plan of Care Reviewed With: patient  Progress: improving  Outcome Summary: OT tx this date. Pt agreed to therapy, pt requested to go to the bathroom. Pt min assist for sit to stand with vc not to push through RUE; Pt min assist HHA from chair to toilet and min assist for toilet t/f. Pt dependent with toileting. Pt engaged in AROM BUE ex, Pt required redirection due to fatigue and falling asleep. Pt could benefit from further skilled OT to reach PLOF.

## 2021-04-07 NOTE — PLAN OF CARE
Goal Outcome Evaluation:  Plan of Care Reviewed With: patient  Progress: improving  Outcome Summary: PT treatment with spouse in attendance part of session. Pt is initiating movement w/out coaxing, has been moving self from chair to toilet indep but has troubel w/ toilet hygiene unless assisted. She has hx R radial styloid fx which was not diagnosed here but care everywhere had ortho note from 3.19.2021 that states it was 3 wks old at that time. PT placed platform on FWRW and gait trained her to use it in order to decrease wt bearing on RUE as much as we can.She denied acute pain to R wrist but did have new c/o R elbow edema, heat and pain that is new.  RN notified who stated there was an IV on that forearm w/ fluids and they were aware. Pt still used RUE in functional activity spontaneously tho encouraged to decrease wt on RUE. She is not skilled with this tho did perform w/ supervision and CGA 1. Pt recommended to do rehab to home program to increase ex lopez, gait indep and safety. Pt/family want to have that program locally to enable them to visit.

## 2021-04-07 NOTE — THERAPY TREATMENT NOTE
Patient Name: Yamileth Slater  : 1960    MRN: [id number]                              Today's Date: 2021       Admit Date: 3/31/2021    Visit Dx:     ICD-10-CM ICD-9-CM   1. Hyperglycemia  R73.9 790.29   2. Urinary tract infection in female  N39.0 599.0   3. Impaired mobility and ADLs  Z74.09 V49.89    Z78.9    4. Impaired functional mobility, balance, gait, and endurance  Z74.09 V49.89     Patient Active Problem List   Diagnosis   • Type 2 diabetes mellitus with diabetic polyneuropathy, with long-term current use of insulin (CMS/[facility])   • Chronic obstructive pulmonary disease (CMS/[facility])   • Chronic midline low back pain without sciatica   • Vitamin D deficiency   • Type 2 diabetes mellitus (CMS/[facility])   • Tobacco dependence syndrome   • Surgical follow-up care   • Shoulder joint pain   • Peripheral vascular disease (CMS/[facility])   • Pain   • Neurologic disorder associated with diabetes mellitus (CMS/[facility])   • Morbid obesity with BMI of 50.0-59.9, adult (CMS/[facility])   • Mixed hyperlipidemia   • Kidney stone   • History of colon polyps   • GERD (gastroesophageal reflux disease)   • Excoriated eczema   • Essential hypertension   • Encounter for medication refill   • Dyslipidemia   • Diabetes mellitus (CMS/[facility])   • Coronary artery disease   • Chronic obstructive lung disease (CMS/[facility])   • Chronic folliculitis   • Chest pain   • Mild persistent asthma   • Anxiety states   • 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   • Uncontrolled type 2 diabetes mellitus with complication, with long-term current use of insulin (CMS/[facility])   • Acute renal failure superimposed on chronic kidney disease (CMS/[facility])   • Anemia   • Hypothyroidism   • Carotid artery disease (CMS/[facility])   • Current smoker   • DM type 2 with diabetic peripheral neuropathy (CMS/[facility])   • [place] abuse   • PAD (peripheral artery disease) (CMS/[facility])   • Pneumonia of both lungs due to infectious organism   • S/P CABG x 3   • Intercostal pain   •  Venous insufficiency   • Dyspnea on exertion   • LAFB (left anterior fascicular block)   • Pulmonary hypertension (CMS/HCC)   • Left hip pain   • Neck pain   • Stage 3b chronic kidney disease (CMS/HCC)   • MIKE and COPD overlap syndrome (CMS/HCC)   • Pneumonia due to COVID-19 virus   • CKD (chronic kidney disease) stage 4, GFR 15-29 ml/min (CMS/HCC)   • Morbid obesity (CMS/HCC)   • Type 2 diabetes mellitus with hyperosmolar hyperglycemic state (HHS) (CMS/HCC)   • Metabolic acidosis   • Hyponatremia   • Hyperkalemia   • Hypocalcemia   • Anemia   • Acute cystitis   • Cutaneous candidiasis   • Community acquired pneumonia of right middle lobe of lung   • MRSA infection     Past Medical History:   Diagnosis Date   • Anxiety    • Carotid artery stenosis    • Chronic obstructive lung disease (CMS/HCC)    • CKD (chronic kidney disease) stage 4, GFR 15-29 ml/min (CMS/HCC)    • Colonic polyp    • Coronary arteriosclerosis    • Diabetes mellitus (CMS/HCC)    • Diabetic neuropathy (CMS/HCC)    • GERD (gastroesophageal reflux disease)    • Hypercholesterolemia    • Hypertension    • Morbid obesity (CMS/HCC)    • Nephrolithiasis    • Peripheral vascular disease (CMS/HCC)    • Sleep apnea    • Substance abuse (CMS/HCC)    • Vitamin D deficiency      Past Surgical History:   Procedure Laterality Date   • CARDIAC CATHETERIZATION N/A 7/14/2020   • CAROTID STENT Left    • COLONOSCOPY     • CORONARY ARTERY BYPASS GRAFT     • CYSTOSCOPY BLADDER STONE LITHOTRIPSY Bilateral      General Information     Row Name 04/07/21 0924          OT Time and Intention    Document Type  therapy note (daily note)  -     Mode of Treatment  individual therapy;occupational therapy  -     Row Name 04/07/21 0924          General Information    Patient Profile Reviewed  yes  -     Existing Precautions/Restrictions  fall R wrist fx precuations until further MD clarification  -     Row Name 04/07/21 0924          Cognition    Orientation Status  (Cognition)  oriented x 4  -[facility] Name 04/07/21 0924          Safety Issues, Functional Mobility    Safety Issues Affecting Function (Mobility)  ability to follow commands;at risk behavior observed;impulsivity;safety precaution awareness;problem-solving;judgment;insight into deficits/self-awareness;safety precautions follow-through/compliance;sequencing abilities  -     Impairments Affecting Function (Mobility)  cognition;balance;coordination;endurance/activity tolerance;sensation/sensory awareness;strength;motor control;motor planning;pain;range of motion (ROM);postural/trunk control  -     Cognitive Impairments, Mobility Safety/Performance  attention;awareness, need for assistance;impulsivity;insight into deficits/self-awareness;judgment;problem-solving/reasoning;sequencing abilities;safety precaution follow-through;safety precaution awareness  -       User Key  (r) = Recorded By, (t) = Taken By, (c) = Cosigned By    Initials Name Provider Type     Mary Jo Michel, OTR/L Occupational Therapist          Mobility/ADL's     [facility] Name 04/07/21 0924          Bed Mobility    Comment (Bed Mobility)  defer pt up in chair and stayed in chair at end of session  -BH     Row Name 04/07/21 0924          Transfers    Transfers  sit-stand transfer;toilet transfer  -     Comment (Transfers)  when OT entered pt requested to go to the bathroom  -     Sit-Stand [place] (Transfers)  minimum assist (75% patient effort);1 person assist;verbal cues  -     [place] Level (Toilet Transfer)  minimum assist (75% patient effort);nonverbal cues (demo/gesture);verbal cues  -[facility] Name 04/07/21 0924          Toilet Transfer    Type (Toilet Transfer)  sit-stand;stand-sit  -BH     Row Name 04/07/21 0924          Functional Mobility    Functional Mobility- Ind. Level  minimum assist (75% patient effort);nonverbal cues required (demo/gesture);verbal cues required  -     Functional Mobility- Comment  no walker due  to R wrist fx uncertain MD direction; delayed responses at times  -[facility] Name 04/07/21 0924          Activities of Daily Living    BADL Assessment/Intervention  toileting;grooming  -[facility] Name 04/07/21 0924          Mobility    Extremity Weight-bearing Status  right upper extremity  -     Right Upper Extremity (Weight-bearing Status)  non weight-bearing (NWB) wrist  -[facility] Name 04/07/21 0924          Toileting Assessment/Training    [place] Level (Toileting)  toileting skills;dependent (less than 25% patient effort)  -       User Key  (r) = Recorded By, (t) = Taken By, (c) = Cosigned By    Initials Name Provider Type     Mary Jo Michel, OTR/L Occupational Therapist        Obj/Interventions     [facility] Name 04/07/21 0924          Shoulder (Therapeutic Exercise)    Shoulder Strengthening (Therapeutic Exercise)  bilateral;flexion;extension;sitting;aBduction;aDduction;horizontal aBduction/aDduction 20 reps  -[facility] Name 04/07/21 0924          Elbow/Forearm (Therapeutic Exercise)    Elbow/Forearm (Therapeutic Exercise)  AROM (active range of motion)  -     Elbow/Forearm AROM (Therapeutic Exercise)  bilateral;flexion;extension;sitting 20 reps  -[facility] Name 04/07/21 0924          Wrist (Therapeutic Exercise)    Wrist (Therapeutic Exercise)  AROM (active range of motion)  -     Wrist Strengthening (Therapeutic Exercise)  left;flexion;extension 20 reps  -[facility] Name 04/07/21 0924          Hand (Therapeutic Exercise)    Hand (Therapeutic Exercise)  AROM (active range of motion)  -     Hand AROM/AAROM (Therapeutic Exercise)  bilateral;AROM (active range of motion);finger flexion;finger extension 20 reps  -[facility] Name 04/07/21 0924          Balance    Static Sitting Balance  WFL  -     Dynamic Sitting Balance  mild impairment  -     Static Standing Balance  mild impairment  -     Dynamic Standing Balance  mild impairment;moderate impairment  -[facility] Name 04/07/21 0924           Therapeutic Exercise    Therapeutic Exercise  shoulder;elbow/forearm  -       User Key  (r) = Recorded By, (t) = Taken By, (c) = Cosigned By    Initials Name Provider Type     Mary Jo Michel, OTR/L Occupational Therapist        Goals/Plan     Row Name 04/07/21 0924          Transfer Goal 1 (OT)    Activity/Assistive Device (Transfer Goal 1, OT)  toilet;commode, bedside with drop arms;commode, bedside without drop arms  -     [place] Level/Cues Needed (Transfer Goal 1, OT)  minimum assist (75% or more patient effort)  -     Time Frame (Transfer Goal 1, OT)  long term goal (LTG);by discharge  -     Progress/Outcome (Transfer Goal 1, OT)  goal partially met  -BH     Row Name 04/07/21 0924          Bathing Goal 1 (OT)    Activity/Device (Bathing Goal 1, OT)  bathing skills, all  -     [place] Level/Cues Needed (Bathing Goal 1, OT)  set-up required;standby assist;verbal cues required  -     Time Frame (Bathing Goal 1, OT)  long term goal (LTG);by discharge  -     Progress/Outcomes (Bathing Goal 1, OT)  goal not met  -BH     Row Name 04/07/21 0924          Dressing Goal 1 (OT)    Activity/Device (Dressing Goal 1, OT)  dressing skills, all  -     [place]/Cues Needed (Dressing Goal 1, OT)  set-up required;standby assist;verbal cues required  -     Time Frame (Dressing Goal 1, OT)  long term goal (LTG);by discharge  -     Progress/Outcome (Dressing Goal 1, OT)  goal not met  -       User Key  (r) = Recorded By, (t) = Taken By, (c) = Cosigned By    Initials Name Provider Type     Mary Jo Michel, OTR/L Occupational Therapist        Clinical Impression     Row Name 04/07/21 0924          Pain Assessment    Additional Documentation  Pain Scale: Numbers Pre/Post-Treatment (Group)  -BH     Row Name 04/07/21 0924          Pain Scale: Numbers Pre/Post-Treatment    Pretreatment Pain Rating  9/10  -     Posttreatment Pain Rating  7/10  -     Pain Location  back  -      Pre/Posttreatment Pain Comment  back, legs; RN notified  -     Pain Intervention(s)  Ambulation/increased activity;Distraction;Rest;Repositioned  -     Row Name 04/07/21 0924          Plan of Care Review    Plan of Care Reviewed With  patient  -     Progress  improving  -     Outcome Summary  OT tx this date. Pt agreed to therapy, pt requested to go to the bathroom. Pt min assist for sit to stand with vc not to push through RUE; Pt min assist HHA from chair to toilet and min assist for toilet t/f. Pt dependent with toileting. Pt engaged in AROM BUE ex, Pt required redirection due to fatigue and falling asleep. Pt could benefit from further skilled OT to reach PLOF.  -     Row Name 04/07/21 0924          Vital Signs    Pre Systolic BP Rehab  162  -     Pre Treatment Diastolic BP  81  -     Pretreatment Heart Rate (beats/min)  67  -BH     Pre SpO2 (%)  94  -     O2 Delivery Pre Treatment  room air  -     Pre Patient Position  Sitting after toileting  -     Row Name 04/07/21 0924          Positioning and Restraints    Pre-Treatment Position  sitting in chair/recliner  -     Post Treatment Position  chair  -BH     In Chair  notified nsg;reclined;sitting;call light within reach;encouraged to call for assist  -       User Key  (r) = Recorded By, (t) = Taken By, (c) = Cosigned By    Initials Name Provider Type     Mary Jo Michel, OTR/L Occupational Therapist        Outcome Measures     Row Name 04/07/21 0924          How much help from another is currently needed...    Putting on and taking off regular lower body clothing?  2  -     Bathing (including washing, rinsing, and drying)  2  -     Toileting (which includes using toilet bed pan or urinal)  2  -     Putting on and taking off regular upper body clothing  2  -     Taking care of personal grooming (such as brushing teeth)  3  -BH     Eating meals  3  -BH     AM-PAC 6 Clicks Score (OT)  14  -     Row Name 04/07/21 0924           Functional Assessment    Outcome Measure Options  AM-PAC 6 Clicks Daily Activity (OT)  -       User Key  (r) = Recorded By, (t) = Taken By, (c) = Cosigned By    Initials Name Provider Type     Mary Jo Michel, OTR/L Occupational Therapist        Occupational Therapy Education                 Title: PT OT SLP Therapies (In Progress)     Topic: Occupational Therapy (In Progress)     Point: ADL training (In Progress)     Description:   Instruct learner(s) on proper safety adaptation and remediation techniques during self care or transfers.   Instruct in proper use of assistive devices.              Learning Progress Summary           Patient Acceptance, E, NR by  at 4/7/2021 1230    Comment: Educated about OT and POC. Educated to call for assist. Educated on safety throughout.    Acceptance, E, NR by  at 4/4/2021 1309    Acceptance, E, NR by  at 4/2/2021 1223    Comment: Educated about OT and POC. Educated on safety throughout, educated on precuations with right wrist. educated to call for assist.   Family Acceptance, E, NR by  at 4/2/2021 1223    Comment: Educated about OT and POC. Educated on safety throughout, educated on precuations with right wrist. educated to call for assist.                   Point: Home exercise program (Done)     Description:   Instruct learner(s) on appropriate technique for monitoring, assisting and/or progressing therapeutic exercises/activities.              Learning Progress Summary           Patient Acceptance, E, VU by  at 4/6/2021 1450                   Point: Precautions (In Progress)     Description:   Instruct learner(s) on prescribed precautions during self-care and functional transfers.              Learning Progress Summary           Patient Acceptance, E, NR by  at 4/7/2021 1230    Comment: Educated about OT and POC. Educated to call for assist. Educated on safety throughout.    Acceptance, E, NR by  at 4/2/2021 1223    Comment: Educated about OT and POC.  Educated on safety throughout, educated on precuations with right wrist. educated to call for assist.   Family Acceptance, E, NR by  at 4/2/2021 122    Comment: Educated about OT and POC. Educated on safety throughout, educated on precuations with right wrist. educated to call for assist.                   Point: Body mechanics (Not Started)     Description:   Instruct learner(s) on proper positioning and spine alignment during self-care, functional mobility activities and/or exercises.              Learner Progress:  Not documented in this visit.                      User Key     Initials Effective Dates Name Provider Type Discipline     06/08/18 -  Mary Jo Michel, OTR/L Occupational Therapist OT    BB 03/07/18 -  Nissa Bey, ISHAN/L Occupational Therapy Assistant OT              OT Recommendation and Plan  Planned Therapy Interventions (OT): activity tolerance training, adaptive equipment training, BADL retraining, edema control/reduction, functional balance retraining, IADL retraining, neuromuscular control/coordination retraining, occupation/activity based interventions, passive ROM/stretching, patient/caregiver education/training, transfer/mobility retraining, strengthening exercise, ROM/therapeutic exercise, orthotic fabrication/fitting/training  Therapy Frequency (OT): other (see comments) (5-7 days a week)  Plan of Care Review  Plan of Care Reviewed With: patient  Progress: improving  Outcome Summary: OT tx this date. Pt agreed to therapy, pt requested to go to the bathroom. Pt min assist for sit to stand with vc not to push through RUE; Pt min assist HHA from chair to toilet and min assist for toilet t/f. Pt dependent with toileting. Pt engaged in AROM BUE ex, Pt required redirection due to fatigue and falling asleep. Pt could benefit from further skilled OT to reach PLOF.     Time Calculation:   Time Calculation- OT     Row Name 04/07/21 1842             Time Calculation- OT    OT Start Time   0923  -      OT Stop Time  1022  -      OT Time Calculation (min)  59 min  -      Total Timed Code Minutes- OT  59 minute(s)  -      OT Received On  04/07/21  -        User Key  (r) = Recorded By, (t) = Taken By, (c) = Cosigned By    Initials Name Provider Type     Mary Jo Michel, OTR/L Occupational Therapist        Therapy Charges for Today     Code Description Service Date Service Provider Modifiers Qty    65504511349 HC OT SELF CARE/MGMT/TRAIN EA 15 MIN 4/7/2021 Mary Jo Michel OTR/L GO 1    53713686718 HC OT THERAPEUTIC ACT EA 15 MIN 4/7/2021 Mary Jo Michel OTR/L GO 1    31253014287 HC OT THER PROC EA 15 MIN 4/7/2021 Mary Jo Michel OTR/L GO 2               Mary Jo Michel OTR/L  4/7/2021

## 2021-04-07 NOTE — PROGRESS NOTES
"[facility]  [address] [place] [address]. [address]  T - [phone]  F - [phone]     Progress Note          PATIENT  DEMOGRAPHICS   PATIENT NAME: Yamileth Slater                      PHYSICIAN: JmBRIDGETT Watson  : 1960  MRN: [id number]   LOS: 6 days    Patient Care Team:  Nirmal Calvillo MD as PCP - General (Family Medicine)  Subjective   SUBJECTIVE   No acute events overnight. Sitting out of bed to chair       Objective   OBJECTIVE   Vital Signs  Temp:  [96.6 °F (35.9 °C)-97.5 °F (36.4 °C)] 96.6 °F (35.9 °C)  Heart Rate:  [67-84] 67  Resp:  [16-22] 20  BP: (125-167)/(60-77) 146/77    Flowsheet Rows        First Filed Value   Admission Height  157.5 cm (62\") Documented at 2021 1136   Admission Weight  125 kg (275 lb) Documented at 2021 1136             I/O last 3 completed shifts:  In: 3710 [P.O.:2760; I.V.:950]  Out:  [Urine:]    PHYSICAL EXAM    Physical Exam  Vitals and nursing note reviewed.   Constitutional:       Appearance: She is obese.   HENT:      Head: Normocephalic and atraumatic.   Cardiovascular:      Rate and Rhythm: Normal rate and regular rhythm.   Pulmonary:      Effort: Pulmonary effort is normal.      Breath sounds: Normal breath sounds.   Abdominal:      General: There is distension.   Musculoskeletal:      Right lower leg: Edema present.      Left lower leg: Edema present.   Skin:     General: Skin is warm and dry.      Coloration: Skin is pale.   Neurological:      Mental Status: She is alert. Mental status is at baseline.   Psychiatric:      Comments: irritable         RESULTS   Results Review:    Results from last 7 days   Lab Units 21  0558 21  0543 21  0537   SODIUM mmol/L 134* 132* 135*   POTASSIUM mmol/L 4.0 4.2 3.9   CHLORIDE mmol/L 105 102 105   CO2 mmol/L 22.0 21.0* 19.0*   BUN mg/dL 31* 27* 22   CREATININE mg/dL 2.20* 2.36* 1.88*   CALCIUM mg/dL 8.4* 8.8 8.8   BILIRUBIN mg/dL <0.2 0.2 0.2 "   ALK PHOS U/L 189* 200* 178*   ALT (SGPT) U/L 12 12 11   AST (SGOT) U/L 17 13 13   GLUCOSE mg/dL 287* 425* 334*       Estimated Creatinine Clearance: 34 mL/min (A) (by C-G formula based on SCr of 2.2 mg/dL (H)).    Results from last 7 days   Lab Units 04/01/21  0345 03/31/21  2350 03/31/21  1949   MAGNESIUM mg/dL 1.8 2.0 1.9   PHOSPHORUS mg/dL 3.8 3.4 3.6             Results from last 7 days   Lab Units 04/07/21  0558 04/06/21  0543 04/05/21  0537 04/04/21  0623 04/03/21  0321   WBC 10*3/mm3 11.29* 9.58 8.05 8.17 8.36   HEMOGLOBIN g/dL 9.8* 10.3* 9.3* 10.0* 8.5*   PLATELETS 10*3/mm3 326 317 294 310 263               Imaging Results (Last 24 Hours)       ** No results found for the last 24 hours. **             MEDICATIONS    atorvastatin, 40 mg, Oral, Daily  clindamycin, 600 mg, Oral, Q8H  clopidogrel, 75 mg, Oral, Daily  gabapentin, 800 mg, Oral, TID  heparin (porcine), 5,000 Units, Subcutaneous, Q8H  insulin aspart, 0-24 Units, Subcutaneous, TID AC  insulin aspart, 20 Units, Subcutaneous, TID With Meals  insulin detemir, 35 Units, Subcutaneous, Q12H  ipratropium-albuterol, 3 mL, Nebulization, 4x Daily - RT  isosorbide mononitrate, 30 mg, Oral, Daily  metoprolol tartrate, 100 mg, Oral, Q12H  montelukast, 10 mg, Oral, Nightly  nystatin, , Topical, TID  oxybutynin XL, 5 mg, Oral, Daily  pantoprazole, 40 mg, Oral, Nightly  potassium chloride, 10 mEq, Oral, Daily  sodium bicarbonate, 1,300 mg, Oral, BID  sodium chloride, 10 mL, Intravenous, Q12H  sodium chloride, 10 mL, Intravenous, Q12H  sodium chloride, 10 mL, Intravenous, Q12H           Assessment/Plan   ASSESSMENT / PLAN      Type 2 diabetes mellitus with hyperosmolar hyperglycemic state (HHS) (CMS/[facility])    Chronic midline low back pain without sciatica    GERD (gastroesophageal reflux disease)    Coronary artery disease    Chronic obstructive lung disease (CMS/HCC)    Acute renal failure superimposed on chronic kidney disease (CMS/HCC)    Current smoker     Metabolic acidosis    Hyponatremia    Hyperkalemia    Hypocalcemia    Anemia    Acute cystitis    Cutaneous candidiasis    Community acquired pneumonia of right middle lobe of lung    MRSA infection    1. Acute Kidney Failure on CKD 4: Baseline creatinine 1.8-2.0 mg/dl  - Developed ERIKA in the setting of DKA.   - UA showed 2+ protein, 1+ blood, 21-30 RBCs, numerous WBCs. Hansels stain is negative.  Urine sodium 115, creatinine 14.9, protein 81.8  - Renal US no hydro  - Creatinine is 2.2 mg/dl, near BL, stop IVF, continue PO bicarb  - Maintain hemodynamics. Monitor I&Os. Avoid nephrotoxins like NSAIDs and IV contrast     2. Hypertension:   - Blood pressure is controlled     3. Anemia:  - Hemoglobin is stable. Finished IV iron     4. DKA: resolved     5. Diabetes type 2     6. Diastolic heart failure     7. Sleep apnea     8. Obesity     9. Metabolic Acidosis- on sodium bicarb 1300mg po bid            This document has been electronically signed by BRIDGETT Hadley on April 7, 2021 13:23 CDT      For this patient encounter, I have reviewed the Nurse Practitioner's documentation, medical decision making, and treatment plan and personally spent time with the patient.

## 2021-04-07 NOTE — THERAPY PROGRESS REPORT/RE-CERT
Acute Care - Physical Therapy Progress Note  [facility]     Patient Name: Yamileth Slater  : 1960  MRN: [id number]  Today's Date: 2021           PT Assessment (last 12 hours)      PT Evaluation and Treatment     Row Name 21 1424          Physical Therapy Time and Intention    Subjective Information  complains of;pain  -     Document Type  progress note/recertification  -     Patient Effort  good  -     Row Name 21 1424          General Information    Patient Profile Reviewed  yes  -     Patient Observations  alert;cooperative;agree to therapy  -     Existing Precautions/Restrictions  cardiac;fall  -     Row Name 21 1424          Living Environment    Lives With  spouse  -     Row Name 21 1424          Stairs Within Home, Primary    Stairs Comment, Within Home, Primary  reports long walk to their apt  -     Row Name 21 1424          Sensory    Hearing Status  WFL  -     Row Name 21 1424          Cognition    Orientation Status (Cognition)  oriented x 4  -     Row Name 21 1424          Pain Scale: Numbers Pre/Post-Treatment    Pretreatment Pain Rating  7/10  -GB     Posttreatment Pain Rating  7/10  -GB     Pain Location  back  -     Pre/Posttreatment Pain Comment  states it worse w/ gait, better in chair;  -     Row Name 21 1424          Pain Scale: Word Pre/Post-Treatment    Pain Intervention(s)  Repositioned  -     Row Name 21 1424          Range of Motion Comprehensive    Comment, General Range of Motion  pt c/o edema/pain/heat R elbow area; confirmed by palpation; some inc. warmth R compared to L ; spontaneous AROM yaz UE/LEs including R wrist; cautioned her to try platform on FWRW which was successful for her but needed cuing to take arm off platform before trying to sit; she demo understanding;   -     Row Name 21 1424          Strength (Manual Muscle Testing)    Strength (Manual Muscle Testing)   strength is WFL;bilateral lower extremities  -GB     Row Name 04/07/21 1424          Mobility    Extremity Weight-bearing Status  right upper extremity  -GB     Right Upper Extremity (Weight-bearing Status)  -- platform attached R to walker ;  -GB     Row Name 04/07/21 1500 04/07/21 1424       Transfers    Transfers  --  sit-stand transfer;stand-sit transfer  -GB    Sit-Stand [place] (Transfers)  moderate assist (50% patient effort)  -GB  --    Stand-Sit [place] (Transfers)  minimum assist (75% patient effort)  -GB  --    [place] Level (Toilet Transfer)  moderate assist (50% patient effort);1 person assist  -GB  --    Assistive Device (Toilet Transfer)  walker, platform  -GB  --    Row Name 04/07/21 1500          Sit-Stand Transfer    Assistive Device (Sit-Stand Transfers)  walker, rolling platform  -GB     Row Name 04/07/21 1500          Stand-Sit Transfer    Assistive Device (Stand-Sit Transfers)  walker, rolling platform  -GB     Row Name 04/07/21 1500          Toilet Transfer    Type (Toilet Transfer)  -- keyshawn care by PT at pt request;  -GB     Row Name 04/07/21 1500          Gait/Stairs (Locomotion)    Gait/Stairs Locomotion  gait/ambulation independence;gait/ambulation assistive device;distance ambulated;gait pattern;gait deviations  -GB     [place] Level (Gait)  minimum assist (75% patient effort);moderate assist (50% patient effort);1 person assist  -GB     Assistive Device (Gait)  walker, rolling platform  -GB     Distance in Feet (Gait)  8,16, 32, 22  -GB     Pattern (Gait)  step-to  -GB     Deviations/Abnormal Patterns (Gait)  antalgic;nasir decreased;gait speed decreased;stride length decreased  -GB     Comment (Gait/Stairs)  gait training primary focus for function to help her learn how to use platform on FWRW , most difficult area for her was getting in/out of bathroom w/ platform RW,needing cuing and CGA 1 to maneuver safely. With Platform RW, she will need help and was counseled  to call/wait for that help rather than go indep. She needed cuing to not pull up on walker and not to forget to remove arm before sitting; also cued to stand up  from chair rather than hold to walker w/ sit to stand.   -     Row Name 04/07/21 1424          Balance    Static Sitting Balance  WFL  -     Dynamic Sitting Balance  WFL  -GB     Static Standing Balance  mild impairment  -GB     Dynamic Standing Balance  mild impairment;moderate impairment  -     Comment, Balance  pt used to moveing w/out RW so spontaneously will initiate position change and wanted to walk w/out AD; She did walk w/ CGA 1 but has unsteady gait w/ some R hip pain by end of 22 ft gait;   -     Row Name 04/07/21 1424          Knee (Therapeutic Exercise)    Knee (Therapeutic Exercise)  AROM (active range of motion)  -     Knee AROM (Therapeutic Exercise)  bilateral;flexion;extension;sitting;10 repetitions;2 sets  -     Row Name 04/07/21 1424          Ankle (Therapeutic Exercise)    Ankle (Therapeutic Exercise)  AROM (active range of motion)  -     Ankle AROM (Therapeutic Exercise)  bilateral;dorsiflexion;plantarflexion;sitting;10 repetitions;2 sets  -     Row Name 04/07/21 1424          Plan of Care Review    Plan of Care Reviewed With  patient  -     Outcome Summary  PT treatment with spouse in attendance part of session. Pt is initiating movement w/out coaxing, has been moving self from chair to toilet indep but has troubel w/ toilet hygiene unless assisted. She has hx R radial styloid fx which was not diagnosed here but care everywhere had ortho note from 3.19.2021 that states it was 3 wks old at that time. PT placed platform on FWRW and gait trained her to use it in order to decrease wt bearing on RUE as much as we can.She denied acute pain to R wrist but did have new c/o R elbow edema, heat and pain that is new.  Pt still used RUE in functional activity spontaneously tho encouraged to decrease wt on RUE. She is not skilled  with this tho did perform w/ supervision and CGA 1. Pt recommended to do rehab to home program to increase ex lopez, gait indep and safety. Pt/family want to have that program locally to enable them to visit.   -GB     Row Name 04/07/21 1500          Vital Signs    Pre Systolic BP Rehab  166  -GB     Pre Treatment Diastolic BP  85  -GB     Intra Systolic BP Rehab  146  -GB     Intra Treatment Diastolic BP  64  -GB     Post Systolic BP Rehab  147  -GB     Post Treatment Diastolic BP  74  -GB     Pretreatment Heart Rate (beats/min)  72  -GB     Intratreatment Heart Rate (beats/min)  70  -GB     Posttreatment Heart Rate (beats/min)  81  -GB     Pre SpO2 (%)  94  -GB     O2 Delivery Pre Treatment  room air  -GB     Intra SpO2 (%)  95  -GB     O2 Delivery Intra Treatment  room air  -GB     Post SpO2 (%)  95  -GB     O2 Delivery Post Treatment  room air  -GB     Pre Patient Position  Sitting  -GB     Intra Patient Position  Sitting post gait to 6 ft  -GB     Post Patient Position  Standing post gait  32 ft w/ Platform RW;  -GB     Row Name 04/07/21 1500          Positioning and Restraints    Pre-Treatment Position  bathroom  -GB     Row Name 04/07/21 1500          Therapy Assessment/Plan (PT)    Rehab Potential (PT)  good, to achieve stated therapy goals  -GB     Criteria for Skilled Interventions Met (PT)  yes  -GB     Problem List (PT)  balance;strength  -GB       User Key  (r) = Recorded By, (t) = Taken By, (c) = Cosigned By    Initials Name Provider Type    GB Gauri Anderson, PT Physical Therapist        Physical Therapy Education                 Title: PT OT SLP Therapies (In Progress)     Topic: Physical Therapy (In Progress)     Point: Mobility training (Done)     Learning Progress Summary           Patient Acceptance, D,E, NR,VU,DU by GB at 4/7/2021 1633    Comment: using platform FWRW for mobility;    Acceptance, E,TB, VU,NR,DU by LN at 4/6/2021 1430    Comment: home safety,room safety    Acceptance,  D,E, NR by JUANCARLOS at 4/1/2021 1056    Comment: POC: instructions for eval/ex to encourage active  mobility   Family Acceptance, E,TB, VU,NR,DU by ERIN at 4/6/2021 1430    Comment: home safety,room safety                   Point: Home exercise program (Not Started)     Learner Progress:  Not documented in this visit.          Point: Body mechanics (Done)     Learning Progress Summary           Patient Acceptance, E,TB, VU,NR,DU by ERIN at 4/6/2021 1430    Comment: home safety,room safety   Family Acceptance, E,TB, VU,NR,DU by LN at 4/6/2021 1430    Comment: home safety,room safety                   Point: Precautions (Done)     Learning Progress Summary           Patient Acceptance, D,E, NR,VU,DU by JUANCARLOS at 4/7/2021 1633    Comment: using platform FWRW for mobility;    Acceptance, E,TB, VU,NR,DU by LN at 4/6/2021 1430    Comment: home safety,room safety   Family Acceptance, E,TB, VU,NR,DU by LN at 4/6/2021 1430    Comment: home safety,room safety                               User Key     Initials Effective Dates Name Provider Type Discipline     04/03/18 -  Gauri Anderson, PT Physical Therapist PT    ERIN 03/07/18 -  Lor Tavarez, PTA Physical Therapy Assistant PT              PT Recommendation and Plan  Anticipated Discharge Disposition (PT): sub acute care setting, long-term acute care facility  Planned Therapy Interventions (PT): balance training, bed mobility training, gait training, home exercise program, patient/family education, stair training, strengthening, transfer training  Therapy Frequency (PT): other (see comments) (5-7 d/wk)  Plan of Care Reviewed With: patient  Progress: improving  Outcome Summary: PT treatment with spouse in attendance part of session. Pt is initiating movement w/out coaxing, has been moving self from chair to toilet indep but has troubel w/ toilet hygiene unless assisted. She has hx R radial styloid fx which was not diagnosed here but care everywhere had ortho note from 3.19.2021  that states it was 3 wks old at that time. PT placed platform on FWRW and gait trained her to use it in order to decrease wt bearing on RUE as much as we can.She denied acute pain to R wrist but did have new c/o R elbow edema, heat and pain that is new.  Pt still used RUE in functional activity spontaneously tho encouraged to decrease wt on RUE. She is not skilled with this tho did perform w/ supervision and CGA 1. Pt recommended to do rehab to home program to increase ex lopez, gait indep and safety. Pt/family want to have that program locally to enable them to visit.   Outcome Measures     Row Name 04/07/21 1600             How much help from another person do you currently need...    Turning from your back to your side while in flat bed without using bedrails?  3  -GB      Moving from lying on back to sitting on the side of a flat bed without bedrails?  2  -GB      Moving to and from a bed to a chair (including a wheelchair)?  3  -GB      Standing up from a chair using your arms (e.g., wheelchair, bedside chair)?  2  -GB      Climbing 3-5 steps with a railing?  2  -GB      To walk in hospital room?  3  -GB      AM-PAC 6 Clicks Score (PT)  15  -GB         Functional Assessment    Outcome Measure Options  AM-PAC 6 Clicks Basic Mobility (PT)  -GB        User Key  (r) = Recorded By, (t) = Taken By, (c) = Cosigned By    Initials Name Provider Type    Gauri Kohli, PT Physical Therapist           Time Calculation:   PT Charges     Row Name 04/07/21 1424             Time Calculation    Start Time  1424  -GB      Stop Time  1536  -GB      Time Calculation (min)  72 min  -GB      PT Received On  04/07/21  -GB      PT Goal Re-Cert Due Date  04/20/21  -GB         Time Calculation- PT    Total Timed Code Minutes- PT  61 minute(s)  -GB         Timed Charges    39958 - Gait Training Minutes   38  -GB      63084 - PT Therapeutic Activity Minutes  23  -GB        User Key  (r) = Recorded By, (t) = Taken By, (c) =  Cosigned By    Initials Name Provider Type    GB Gauri Anderson, PT Physical Therapist        Therapy Charges for Today     Code Description Service Date Service Provider Modifiers Qty    95821652041 HC PT THERAPEUTIC ACT EA 15 MIN 4/7/2021 Gauri Anderson, PT GP 2    53203602649 HC GAIT TRAINING EA 15 MIN 4/7/2021 Gauri Anderson, PT GP 3          PT G-Codes  Outcome Measure Options: AM-PAC 6 Clicks Basic Mobility (PT)  AM-PAC 6 Clicks Score (PT): 15  AM-PAC 6 Clicks Score (OT): 14    Gauri Anderson, STEFFANIE  4/7/2021

## 2021-04-07 NOTE — PROGRESS NOTES
FAMILY MEDICINE DAILY PROGRESS NOTE  NAME: Yamileth Slater  : 1960  MRN: [id number]     LOS: 6 days     PROVIDER OF SERVICE: Nirmal Calvillo MD    Chief Complaint: Type 2 diabetes mellitus with hyperosmolar hyperglycemic state (HHS) (CMS/[facility])    Subjective:     Interval History:  History taken from: patient chart RN  VSS with no acute events overnight. States was unable to sleep due to back pain. Sodium and creatinine improving. Continues to be hyperglycemic with significant amounts of sliding scale insulin. Complains of epigastric pain after drinking coffee. Will increase insulin,  Gabapentin back to home dose, tramadol PRN for back pain and tums.    Review of Systems:   Review of Systems   Constitutional: Negative for activity change and appetite change.   HENT: Negative for congestion and trouble swallowing.    Respiratory: Negative for chest tightness and shortness of breath.    Cardiovascular: Positive for leg swelling. Negative for chest pain and palpitations.   Gastrointestinal: Positive for abdominal pain (Epigastric). Negative for abdominal distention.   Genitourinary: Negative for difficulty urinating and dysuria.   Musculoskeletal: Positive for back pain. Negative for arthralgias and myalgias.   Skin: Negative for color change and pallor.   Neurological: Negative for dizziness, light-headedness and headaches.   Psychiatric/Behavioral: Negative for agitation and behavioral problems.       Objective:     Vital Signs  Temp:  [96.9 °F (36.1 °C)-97.5 °F (36.4 °C)] 96.9 °F (36.1 °C)  Heart Rate:  [69-84] 71  Resp:  [16-22] 22  BP: (125-167)/(60-77) 167/72    Physical Exam  Physical Exam  Constitutional:       General: She is not in acute distress.     Appearance: She is well-developed.   HENT:      Head: Normocephalic and atraumatic.      Right Ear: External ear normal.      Left Ear: External ear normal.      Nose: Nose normal.   Eyes:      Extraocular Movements: Extraocular movements intact.       Pupils: Pupils are equal, round, and reactive to light.   Cardiovascular:      Rate and Rhythm: Normal rate and regular rhythm.      Heart sounds: Normal heart sounds. No murmur heard.   No friction rub. No gallop.    Pulmonary:      Effort: Pulmonary effort is normal. No respiratory distress.      Breath sounds: Examination of the right-lower field reveals rhonchi. Examination of the left-lower field reveals rhonchi. Rhonchi present. No wheezing or rales.   Abdominal:      General: Bowel sounds are normal. There is no distension.      Palpations: Abdomen is soft.      Tenderness: There is no abdominal tenderness.   Musculoskeletal:         General: Normal range of motion.      Cervical back: Normal range of motion and neck supple.      Right lower leg: Edema present.      Left lower leg: Edema present.   Skin:     General: Skin is warm.      Findings: No erythema.   Neurological:      Mental Status: She is alert and oriented to person, place, and time. Mental status is at baseline.   Psychiatric:         Mood and Affect: Mood normal.         Behavior: Behavior normal.         Medication Review    Current Facility-Administered Medications:   •  acetaminophen (TYLENOL) tablet 650 mg, 650 mg, Oral, Q6H PRN, Huy Santa MD, 650 mg at 04/07/21 0251  •  albuterol (PROVENTIL) nebulizer solution 0.083% 2.5 mg/3mL, 2.5 mg, Nebulization, Q4H PRN, Huy Santa MD  •  atorvastatin (LIPITOR) tablet 40 mg, 40 mg, Oral, Daily, Huy Santa MD, 40 mg at 04/07/21 0812  •  calcium carbonate (TUMS) chewable tablet 500 mg (200 mg elemental), 2 tablet, Oral, BID PRN, Nirmal Calvillo MD  •  clindamycin (CLEOCIN) capsule 600 mg, 600 mg, Oral, Q8H, Nirmal Calvillo MD, 600 mg at 04/07/21 0605  •  clopidogrel (PLAVIX) tablet 75 mg, 75 mg, Oral, Daily, Huy Santa MD, 75 mg at 04/07/21 0813  •  cyclobenzaprine (FLEXERIL) tablet 10 mg, 10 mg, Oral, BID PRN, Huy Santa MD, 10 mg at 04/05/21 0003  •  dextrose  (D50W) 25 g/ 50mL Intravenous Solution 25 g, 25 g, Intravenous, Q15 Min PRN, Huy Santa MD  •  dextrose (GLUTOSE) oral gel 15 g, 15 g, Oral, Q15 Min PRN, Huy Santa MD  •  gabapentin (NEURONTIN) capsule 800 mg, 800 mg, Oral, TID, Nirmal Calvillo MD  •  glucagon (human recombinant) (GLUCAGEN DIAGNOSTIC) injection 1 mg, 1 mg, Subcutaneous, Q15 Min PRN, Huy Santa MD  •  heparin (porcine) 5000 UNIT/ML injection 5,000 Units, 5,000 Units, Subcutaneous, Q8H, Huy Santa MD, 5,000 Units at 04/07/21 0500  •  insulin aspart (novoLOG) injection 0-24 Units, 0-24 Units, Subcutaneous, TID AC, Haily Mcneil MD, 12 Units at 04/07/21 0812  •  insulin aspart (novoLOG) injection 20 Units, 20 Units, Subcutaneous, TID With Meals, Nirmal Calvillo MD  •  insulin detemir (LEVEMIR) injection 35 Units, 35 Units, Subcutaneous, Q12H, Nirmal Calvillo MD  •  ipratropium-albuterol (DUO-NEB) nebulizer solution 3 mL, 3 mL, Nebulization, 4x Daily - RT, Huy Santa MD, 3 mL at 04/07/21 0742  •  isosorbide mononitrate (IMDUR) 24 hr tablet 30 mg, 30 mg, Oral, Daily, Huy Santa MD, 30 mg at 04/07/21 0813  •  metoprolol tartrate (LOPRESSOR) tablet 100 mg, 100 mg, Oral, Q12H, Huy Santa MD, 100 mg at 04/07/21 0813  •  montelukast (SINGULAIR) tablet 10 mg, 10 mg, Oral, Nightly, Huy Santa MD, 10 mg at 04/06/21 2035  •  nitroglycerin (NITROSTAT) SL tablet 0.4 mg, 0.4 mg, Sublingual, PRN, Huy Santa MD  •  nystatin (MYCOSTATIN) powder, , Topical, TID, Huy Santa MD, Given at 04/07/21 0812  •  ondansetron ODT (ZOFRAN-ODT) disintegrating tablet 4 mg, 4 mg, Oral, Q8H PRN, Huy Santa MD, 4 mg at 04/07/21 0756  •  oxybutynin XL (DITROPAN-XL) 24 hr tablet 5 mg, 5 mg, Oral, Daily, Huy Santa MD, 5 mg at 04/07/21 0812  •  pantoprazole (PROTONIX) EC tablet 40 mg, 40 mg, Oral, Nightly, Huy Santa MD, 40 mg at 04/06/21 2035  •  potassium chloride (MICRO-K) CR capsule 10 mEq, 10 mEq, Oral,  Daily, Huy Santa MD, 10 mEq at 04/07/21 0813  •  promethazine (PHENERGAN) tablet 25 mg, 25 mg, Oral, Q6H PRN, Huy Santa MD  •  sodium bicarbonate tablet 1,300 mg, 1,300 mg, Oral, BID, Janice Chavez, APRN, 1,300 mg at 04/07/21 0813  •  sodium chloride 0.9 % flush 10 mL, 10 mL, Intravenous, PRN, Huy Santa MD  •  [COMPLETED] Insert peripheral IV, , , Once **AND** sodium chloride 0.9 % flush 10 mL, 10 mL, Intravenous, PRN, Huy Santa MD  •  sodium chloride 0.9 % flush 10 mL, 10 mL, Intravenous, Q12H, Huy Santa MD, 10 mL at 04/05/21 0937  •  sodium chloride 0.9 % flush 10 mL, 10 mL, Intravenous, PRN, Huy Santa MD  •  sodium chloride 0.9 % flush 10 mL, 10 mL, Intravenous, Q12H, Huy Santa MD, 10 mL at 04/05/21 0937  •  sodium chloride 0.9 % flush 10 mL, 10 mL, Intravenous, Q12H, Huy Santa MD, 10 mL at 04/06/21 2035  •  sodium chloride 0.9 % flush 10 mL, 10 mL, Intravenous, PRKINJAL, Huy Santa MD  •  sodium chloride 0.9 % infusion, 75 mL/hr, Intravenous, Continuous, Paulina Solano MD, Last Rate: 75 mL/hr at 04/06/21 2355, 75 mL/hr at 04/06/21 2355  •  traMADol (ULTRAM) tablet 50 mg, 50 mg, Oral, Q8H PRN, Nirmal Calvillo MD     Diagnostic Data    Lab Results (last 24 hours)     Procedure Component Value Units Date/Time    POC Glucose Once [[id number]]  (Abnormal) Collected: 04/07/21 0557    Specimen: Blood Updated: 04/07/21 0815     Glucose 290 mg/dL      Comment: RN NotifiedOperator: [id number] PATRICIA Pratt ID: [id number]       Comprehensive Metabolic Panel [[id number]]  (Abnormal) Collected: 04/07/21 0558    Specimen: Blood Updated: 04/07/21 0723     Glucose 287 mg/dL      BUN 31 mg/dL      Creatinine 2.20 mg/dL      Sodium 134 mmol/L      Potassium 4.0 mmol/L      Chloride 105 mmol/L      CO2 22.0 mmol/L      Calcium 8.4 mg/dL      Total Protein 6.9 g/dL      Albumin 2.80 g/dL      ALT (SGPT) 12 U/L      AST (SGOT) 17 U/L      Alkaline Phosphatase 189  U/L      Total Bilirubin <0.2 mg/dL      eGFR Non African Amer 23 mL/min/1.73      Globulin 4.1 gm/dL      A/G Ratio 0.7 g/dL      BUN/Creatinine Ratio 14.1     Anion Gap 7.0 mmol/L     Narrative:      GFR Normal >60  Chronic Kidney Disease <60  Kidney Failure <15      CBC & Differential [[id number]]  (Abnormal) Collected: 04/07/21 0558    Specimen: Blood Updated: 04/07/21 0654    Narrative:      The following orders were created for panel order CBC & Differential.  Procedure                               Abnormality         Status                     ---------                               -----------         ------                     Scan Slide[[id number]]                                                                  CBC Auto Differential[[id number]]        Abnormal            Final result                 Please view results for these tests on the individual orders.    CBC Auto Differential [[id number]]  (Abnormal) Collected: 04/07/21 0558    Specimen: Blood Updated: 04/07/21 0653     WBC 11.29 10*3/mm3      RBC 3.46 10*6/mm3      Hemoglobin 9.8 g/dL      Hematocrit 31.0 %      MCV 89.6 fL      MCH 28.3 pg      MCHC 31.6 g/dL      RDW 15.3 %      RDW-SD 48.3 fl      MPV 9.3 fL      Platelets 326 10*3/mm3      Neutrophil % 52.6 %      Lymphocyte % 29.9 %      Monocyte % 8.4 %      Eosinophil % 3.0 %      Basophil % 0.9 %      Immature Grans % 5.2 %      Neutrophils, Absolute 5.93 10*3/mm3      Lymphocytes, Absolute 3.38 10*3/mm3      Monocytes, Absolute 0.95 10*3/mm3      Eosinophils, Absolute 0.34 10*3/mm3      Basophils, Absolute 0.10 10*3/mm3      Immature Grans, Absolute 0.59 10*3/mm3      nRBC 0.3 /100 WBC     POC Glucose Once [[id number]]  (Abnormal) Collected: 04/06/21 2022    Specimen: Blood Updated: 04/06/21 2039     Glucose 192 mg/dL      Comment: RN NotifiedOperator: [id number] PATRICIA LEHMANSELMAMeter ID: [id number]       POC Glucose Once [[id number]]  (Abnormal) Collected: 04/06/21 1616    Specimen:  Blood Updated: 04/06/21 1635     Glucose 226 mg/dL      Comment: RN NotifiedOperator: [id number] KEVIN Hung ID: [id number]              Imaging Results (Last 24 Hours)     ** No results found for the last 24 hours. **          I reviewed the patient's new clinical results.    Assessment/Plan:     Active Hospital Problems    Diagnosis    • **Type 2 diabetes mellitus with hyperosmolar hyperglycemic state (HHS) (CMS/[facility])      -IV fluids  - Glucose monitoring  -Levemir 35u bid, Aspart 20 units w/meals, ISS     • MRSA infection    • Cutaneous candidiasis      - Nystatin     • Community acquired pneumonia of right middle lobe of lung      - Confirmed on CXR. CXR 4/5 unremarkable   -Aspiration pneumonitis now suspected.  Patient MRSA positive in nares.  -IV clindamycin 300 mg 3 times daily finish out 10-day course of antibiotic treatment. Started 4/3/21. Switched to Clindamycin 600mg q8h 4/5/21     • Metabolic acidosis      -Underlying HHS  -No anion gap     • Hyponatremia      -Monitor and replace as needed     • Hyperkalemia      -We will monitor and replace         • Hypocalcemia      - Will continue to monitor     • Anemia      -Iron studies indicative of mixed iron deficiency and chronic disease  -Continue home ferrous sulfate       • Acute cystitis      -IV Rocephin 3-day course  -Urine culture negative     • Acute renal failure superimposed on chronic kidney disease (CMS/[facility])      -Hold nephrotoxic drugs  -Nephrology consulted and appreciate recommendations  -NS 75 mls/hr     • Chronic midline low back pain without sciatica      - Tramadol PRN     • Chronic obstructive lung disease (CMS/[facility])      -O2 supplementation  -Home inhalers as needed  -DuoNebs  -CPAP at night     • GERD (gastroesophageal reflux disease)      - IV protonix qday  - Tums     • Coronary artery disease      -Continue home meds     • Current smoker      -Patch           DVT prophylaxis: Heparin  Code Status and Medical Interventions:    Ordered at: 03/31/21 1811     Code Status:    CPR     Medical Interventions (Level of Support Prior to Arrest):    Full       Plan for disposition:Where: rehab and current living arrangements and When:  2-3 days      Time: 15 min           This document has been electronically signed by Nirmal Calvillo MD on April 7, 2021 08:57 CDT

## 2021-04-07 NOTE — DISCHARGE PLACEMENT REQUEST
"Yamileth Slater (61 y.o. 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                 Active Insurance as of 3/31/2021     Primary Coverage     Payor Plan Insurance Group Employer/Plan Group    HUMANA MEDICARE REPLACEMENT HUMANA MEDICARE REPLACEMENT [id number]     Payor Plan Address Payor Plan Phone Number Payor Plan Fax Number Effective Dates    [address] [address] [phone]  7/1/2020 - None Entered    [facility] [id number]       Subscriber Name Subscriber Birth Date Member ID       YAMILETH SLATER 1960 [id number]           Secondary Coverage     Payor Plan Insurance Group Employer/Plan Group    [place]      Payor Plan Address Payor Plan Phone Number Payor Plan Fax Number Effective Dates    [address] [phone]  6/28/2019 - None Entered    [facility] [id number]       Subscriber Name Subscriber Birth Date Member ID       YAMILETH SLATER 1960 [id number]                 Emergency Contacts      (Rel.) Home Phone Work Phone Mobile Phone    Huy Slater (Spouse) [phone] -- [phone]    Yamileth Chavez (Daughter) " [phone] -- [phone]    Suzanne Rivera (Daughter) [phone] -- [phone]               History & Physical      Carline Coffey MD at 03/31/21 1722     Attestation signed by Yadira Delarosa MD at 04/01/21 0854      I was present with the resident during the entire history and physical exam. I discussed the case with the resident.  I have reviewed the notes, assessment and plan, and/or procedures performed by the resident. I concur with the resident’s documentation        Pt with poorly controlled T2DM is brought in by her  due to progressively worsening weakness over the past 2 weeks; she has fallen multiple times at home and has been very lethargic.  She has not been using her CPAP in ~1year because it made her cough. She reports having dysuria & a boil on her left breast that has drained purulent fluid.  Temp:  [96.8 °F (36 °C)-99.3 °F (37.4 °C)] 99.3 °F (37.4 °C)  Heart Rate:  [] 108  Resp:  [14-32] 18  BP: (100-167)/() 119/55  A&Ox3, morbidly obese, speech slightly slurred, NAD, tachycardic, diminished breath sounds throughout secondary to body habitus, normal effort, left breast upper outer quadrant 1cm round indurated wound without active drainage or fluctuance; bilateral maculopapular rash with satellite lesions under breasts & pannus; Abd soft, NT/ND +BS, no lower ext edema, capillary refill <2 sec    A/P: 61 y.o. female admitted with:  Active Hospital Problems    Diagnosis  POA   • **Type 2 diabetes mellitus with hyperosmolar hyperglycemic state (HHS) (CMS/HCC) [E11.00, E11.65]  Yes   • Cutaneous candidiasis [B37.2]  Yes   • Metabolic acidosis [E87.2]  Yes   • Hyponatremia [E87.1]  Yes   • Hyperkalemia [E87.5]  Yes   • Hypocalcemia [E83.51]  Yes   • Anemia [D64.9]  Yes   • Acute cystitis [N30.00]  Yes   • Acute renal failure superimposed on chronic kidney disease (CMS/HCC) [N17.9, N18.9]  Yes   • Chronic obstructive lung disease (CMS/HCC) [J44.9]  Yes   • GERD  (gastroesophageal reflux disease) [K21.9]  Yes   • Coronary artery disease [I25.10]  Yes   • Current smoker [F17.200]  Yes      Resolved Hospital Problems   No resolved problems to display.     Insulin drip with HHS protocol; Rocephin; follow up urine culture; IVFs, nystatin powder for skin folds      Signature  [place] [address]  Office: [phone]    This document has been electronically signed by Yadira Delarosa MD on 2021 08:27 CDT                          HISTORY AND PHYSICAL  NAME: Yamileth Slater  : 1960  MRN: [id number]    DATE OF ADMISSION:  3/31/2021     DATE & TIME SEEN: 21 at 1722    PCP: Nirmal Calvillo MD    CODE STATUS: Full code    CHIEF COMPLAINT: Altered mental status, weakness, falls    HPI:  Yamileth Slater is a 61 y.o. female with a CMH of type 2 diabetes mellitus, chronic kidney disease, anemia, COPD, tobacco use, GERD who presents to the ED with complaints of weakness and multiple falls.  The symptoms have been present for the past 2 weeks and has progressively worsened.  Patient states that she fell multiple times and thus developed gluteal and back pain.  Her  reports that she appears lethargic and often has moments where she is confused and speaks to people that are not there.  She is normally able to ambulate however has not had much success with standing given that her legs give out easily.      CONCURRENT MEDICAL HISTORY:  Past Medical History:   Diagnosis Date   • Anxiety    • Carotid artery stenosis    • Chronic obstructive lung disease (CMS/HCC)    • CKD (chronic kidney disease) stage 4, GFR 15-29 ml/min (CMS/HCC)    • Colonic polyp    • Coronary arteriosclerosis    • Diabetes mellitus (CMS/HCC)    • Diabetic neuropathy (CMS/HCC)    • GERD (gastroesophageal reflux disease)    • Hypercholesterolemia    • Hypertension    • Morbid obesity (CMS/HCC)    •  Nephrolithiasis    • Peripheral vascular disease (CMS/[facility])    • Sleep apnea    • Substance abuse (CMS/[facility])    • Vitamin D deficiency        PAST SURGICAL HISTORY:  Past Surgical History:   Procedure Laterality Date   • CARDIAC CATHETERIZATION N/A 7/14/2020   • CAROTID STENT Left    • COLONOSCOPY     • CORONARY ARTERY BYPASS GRAFT     • CYSTOSCOPY BLADDER STONE LITHOTRIPSY Bilateral        FAMILY HISTORY:  Family History   Problem Relation Age of Onset   • Heart disease Mother    • Heart disease Father    • Heart disease Sister    • Heart disease Brother         SOCIAL HISTORY:  Social History     Socioeconomic History   • Marital status:      Spouse name: wesley dumont   • Number of children: Not on file   • Years of education: Not on file   • Highest education level: Not on file   Tobacco Use   • Smoking status: Light Tobacco Smoker     Packs/day: 0.25     Years: 46.00     Pack years: 11.50     Types: Cigarettes   • Smokeless tobacco: Never Used   • Tobacco comment: only smoking 5 a day    Substance and Sexual Activity   • Alcohol use: No   • Drug use: Not Currently     Types: LSD, Marijuana, Methamphetamines   • Sexual activity: Not Currently       HOME MEDICATIONS:  Prior to Admission medications    Medication Sig Start Date End Date Taking? Authorizing Provider   albuterol sulfate  (90 Base) MCG/ACT inhaler Inhale 2 puffs Every 4 (Four) Hours As Needed for Wheezing. 3/26/21   Nirmal Calvillo MD   amoxicillin-clavulanate (Augmentin) 875-125 MG per tablet Take 1 tablet by mouth Daily. 3/3/21   Florentino Altamirano MD   atorvastatin (LIPITOR) 40 MG tablet Take 1 tablet by mouth Daily. 3/26/21   Nirmal Calvillo MD   azithromycin (ZITHROMAX) 250 MG tablet  1/5/21   Provider, MD Caio   Blood Glucose Monitoring Suppl ([facility] Blood Glucose Meter) w/Device kit 1 each 3 (Three) Times a Day. 10/9/20   Nirmal Calvillo MD   cetirizine (zyrTEC) 10 MG tablet Take 1 tablet by mouth Daily. 3/26/21    Nirmal Calvillo MD   clopidogrel (PLAVIX) 75 MG tablet Take 1 tablet by mouth Daily. 3/26/21   Nirmal Calvillo MD   Continuous Blood Gluc  (FreeStyle Jade 2 Atkinson) device 1 each Continuous. 3/31/21   Nirmal Calvillo MD   Continuous Blood Gluc Sensor (FreeStyle Jade 2 Sensor) misc 1 each Every 14 (Fourteen) Days. 3/31/21   Nirmal Calvillo MD   cyclobenzaprine (FLEXERIL) 10 MG tablet Take 1 tablet by mouth 2 (Two) Times a Day As Needed for Muscle Spasms. 3/26/21   Nirmal Calvillo MD   DULoxetine (Cymbalta) 20 MG capsule Take 1 capsule by mouth Daily. 3/26/21   Nirmal Calvillo MD   EASY TOUCH PEN NEEDLES 31G X 8 MM misc  8/7/20   Provider, MD Caio   Empagliflozin (Jardiance) 25 MG tablet Take 25 mg by mouth Daily. 3/26/21   Nirmal Calvillo MD   ferrous sulfate (FeroSul) 325 (65 FE) MG tablet Take 1 tablet by mouth Daily With Breakfast. 3/26/21   Nirmal Calvillo MD   gabapentin (NEURONTIN) 800 MG tablet Take 1 tablet by mouth 3 (Three) Times a Day. 3/26/21   Nirmal Calvillo MD   glucose blood test strip Use as instructed 10/9/20   Nirmal Calvillo MD   insulin aspart (novoLOG FLEXPEN) 100 UNIT/ML solution pen-injector sc pen Inject 5 Units under the skin into the appropriate area as directed 3 (Three) Times a Day With Meals. 3/26/21   Nirmal Calvillo MD   Insulin Glargine (Lantus SoloStar) 100 UNIT/ML injection pen Inject 100 Units under the skin into the appropriate area as directed Every 12 (Twelve) Hours. 3/26/21   Nirmal Calvillo MD   Insulin Pen Needle (NovoFine) 30G X 8 MM misc As directed 4 times daily 3/26/21   Nirmal Calvillo MD   isosorbide mononitrate (IMDUR) 30 MG 24 hr tablet Take 1 tablet by mouth Daily. 3/26/21   Nirmal Calvillo MD   lidocaine (LIDODERM) 5 % PLACE 1 PATCH ON THE SKIN AS DIRECTED BY PROVIDER DAILY. REMOVE & DISCARD PATCH WITHIN 12 HOURS OR AS DIRECTED BY MD 3/10/21   Nirmal Calvillo MD   lisinopril  (PRINIVIL,ZESTRIL) 10 MG tablet Take 1 tablet by mouth Daily. 12/23/20   Nirmal Calvillo MD   methylPREDNISolone (MEDROL) 4 MG dose pack  1/5/21   Provider, MD Caio   metoprolol tartrate (LOPRESSOR) 100 MG tablet Take 1 tablet by mouth Every 12 (Twelve) Hours for 30 days.  Patient taking differently: Take 100 mg by mouth 2 (Two) Times a Day. 10/9/20 12/18/20  Nirmal Calvillo MD   montelukast (SINGULAIR) 10 MG tablet Take 1 tablet by mouth Every Night. 3/26/21   Nirmal Calvillo MD   nitroglycerin (NITROSTAT) 0.4 MG SL tablet Place 1 tablet under the tongue As Needed for Chest Pain. Take no more than 3 doses in 15 minutes. 3/26/21   Nirmal Calvillo MD   nystatin (MYCOSTATIN) 133111 UNIT/GM powder Apply  topically to the appropriate area as directed 3 (Three) Times a Day. 3/26/21   Nirmal Calvillo MD   ondansetron ODT (Zofran ODT) 4 MG disintegrating tablet Place 1 tablet on the tongue Every 8 (Eight) Hours As Needed for Nausea or Vomiting. 3/26/21   Nirmal Calvillo MD   oxybutynin XL (DITROPAN-XL) 5 MG 24 hr tablet Take 1 tablet by mouth Daily. 3/26/21   Nirmal Calvillo MD   pantoprazole (PROTONIX) 40 MG EC tablet Take 1 tablet by mouth Every Night. 3/26/21   Nirmal Calvillo MD   potassium chloride (MICRO-K) 10 MEQ CR capsule Take 1 capsule by mouth Daily. 3/26/21   Nirmal Calvillo MD   promethazine (PHENERGAN) 25 MG tablet Take 1 tablet by mouth Every 6 (Six) Hours As Needed for Nausea or Vomiting. 3/26/21   Nirmal Calvillo MD   sodium bicarbonate 325 MG tablet Take 1 tablet by mouth 2 (Two) Times a Day. 3/26/21   Nirmal Calvillo MD   Continuous Blood Gluc  (Dexcom G6 ) device 1 each Continuous. 1/29/21 3/31/21  Kit Brar MD   Continuous Blood Gluc Sensor (FreeStyle Jade 2 Sensor) misc 1 each Every 14 (Fourteen) Days. 2/23/21 3/31/21  Nirmal Calvillo MD   Continuous Blood Gluc Transmit (Dexcom G6 Transmitter) misc 1 each  Every 3 (Three) Months. 1/29/21 3/31/21  Kit Brar MD       ALLERGIES:  Adhesive tape and Other    REVIEW OF SYSTEMS  Review of Systems   Constitutional: Positive for chills. Negative for fever.   HENT: Negative for facial swelling and trouble swallowing.    Eyes: Negative for photophobia and visual disturbance.   Respiratory: Positive for cough. Negative for apnea, chest tightness and shortness of breath.    Cardiovascular: Positive for chest pain.   Gastrointestinal: Negative for abdominal distention, diarrhea, nausea and vomiting.   Genitourinary: Positive for dysuria and flank pain. Negative for pelvic pain.   Musculoskeletal: Positive for gait problem. Negative for arthralgias and myalgias.   Neurological: Negative for seizures and speech difficulty.   Psychiatric/Behavioral: Negative for confusion and hallucinations.       PHYSICAL EXAM:  Temp:  [96.8 °F (36 °C)-97.7 °F (36.5 °C)] 97.7 °F (36.5 °C)  Heart Rate:  [71-78] 72  Resp:  [16-32] 16  BP: (117-154)/(57-91) 134/64  Body mass index is 50.3 kg/m².  Physical Exam  Constitutional:       Appearance: She is obese.   HENT:      Head: Normocephalic and atraumatic.      Nose: Nose normal.   Eyes:      Conjunctiva/sclera: Conjunctivae normal.      Pupils: Pupils are equal, round, and reactive to light.   Cardiovascular:      Rate and Rhythm: Normal rate and regular rhythm.      Pulses: Normal pulses.      Heart sounds: Normal heart sounds.   Pulmonary:      Effort: Pulmonary effort is normal. No respiratory distress.      Breath sounds: Normal breath sounds.   Abdominal:      General: Abdomen is flat. Bowel sounds are normal. There is no distension.      Palpations: Abdomen is soft.   Musculoskeletal:         General: Normal range of motion.      Cervical back: Normal range of motion and neck supple.   Skin:     General: Skin is warm and dry.      Findings: Wound present.          Neurological:      General: No focal deficit present.      Mental  Status: She is alert.      Cranial Nerves: No cranial nerve deficit.   Psychiatric:         Mood and Affect: Mood normal.         Behavior: Behavior normal.         DIAGNOSTIC DATA:   Lab Results (last 24 hours)     Procedure Component Value Units Date/Time    Iron Profile [[id number]]  (Abnormal) Collected: 03/31/21 1949    Specimen: Blood Updated: 03/31/21 2108     Iron 14 mcg/dL      Iron Saturation 6 %      Transferrin 158 mg/dL      TIBC 235 mcg/dL     Ferritin [[id number]] Collected: 03/31/21 1949    Specimen: Blood Updated: 03/31/21 2054    Troponin [[id number]]  (Normal) Collected: 03/31/21 1949    Specimen: Blood Updated: 03/31/21 2045     Troponin T <0.010 ng/mL     Narrative:      Troponin T Reference Range:  <= 0.03 ng/mL-   Negative for AMI  >0.03 ng/mL-     Abnormal for myocardial necrosis.  Clinicians would have to utilize clinical acumen, EKG, Troponin and serial changes to determine if it is an Acute Myocardial Infarction or myocardial injury due to an underlying chronic condition.       Results may be falsely decreased if patient taking Biotin.      Osmolality, Serum [[id number]]  (Abnormal) Collected: 03/31/21 1949    Specimen: Blood Updated: 03/31/21 2030     Osmolality 324 mOsm/kg     Basic Metabolic Panel [[id number]]  (Abnormal) Collected: 03/31/21 1949    Specimen: Blood Updated: 03/31/21 2015     Glucose 280 mg/dL      BUN 45 mg/dL      Creatinine 2.37 mg/dL      Sodium 137 mmol/L      Potassium 4.1 mmol/L      Chloride 107 mmol/L      CO2 21.0 mmol/L      Calcium 7.7 mg/dL      eGFR Non African Amer 21 mL/min/1.73      BUN/Creatinine Ratio 19.0     Anion Gap 9.0 mmol/L     Narrative:      GFR Normal >60  Chronic Kidney Disease <60  Kidney Failure <15      Phosphorus [[id number]]  (Normal) Collected: 03/31/21 1949    Specimen: Blood Updated: 03/31/21 2013     Phosphorus 3.6 mg/dL     Magnesium [[id number]]  (Normal) Collected: 03/31/21 1949    Specimen: Blood Updated: 03/31/21 2013     Magnesium  1.9 mg/dL     COVID-19 and FLU A/B PCR - Swab, Nasopharynx [[id number]]  (Normal) Collected: 03/31/21 1819    Specimen: Swab from Nasopharynx Updated: 03/31/21 1841     COVID19 Not Detected     Influenza A PCR Not Detected     Influenza B PCR Not Detected    Narrative:      Fact sheet for providers: https://www.fda.gov/media/368934/download    Fact sheet for patients: https://www.fda.gov/media/333035/download    Test performed by PCR.    Blood Gas, Arterial - [[id number]]  (Abnormal) Collected: 03/31/21 1739    Specimen: Arterial Blood Updated: 03/31/21 1755     Site Left Radial     Shadi's Test N/A     pH, Arterial 7.250 pH units      Comment: 84 Value below reference range        pCO2, Arterial 43.8 mm Hg      pO2, Arterial 111.0 mm Hg      Comment: 83 Value above reference range        HCO3, Arterial 19.2 mmol/L      Comment: 84 Value below reference range        Base Excess, Arterial -7.7 mmol/L      Comment: 84 Value below reference range        O2 Saturation, Arterial 98.5 %      Barometric Pressure for Blood Gas 754 mmHg      Modality Nasal Cannula     Flow Rate 2.0 lpm      Ventilator Mode NA     Collected by RT     Comment: Meter: [id number]     :  [id number]       Hemoglobin A1c [[id number]]  (Abnormal) Collected: 03/31/21 1441    Specimen: Blood Updated: 03/31/21 1744     Hemoglobin A1C 14.00 %     Narrative:      Hemoglobin A1C Ranges:    Increased Risk for Diabetes  5.7% to 6.4%  Diabetes                     >= 6.5%  Diabetic Goal                < 7.0%    Phosphorus [[id number]]  (Normal) Collected: 03/31/21 1442    Specimen: Blood Updated: 03/31/21 1740     Phosphorus 3.9 mg/dL     Ketone Bodies, Serum (Not performed at [facility]) [[id number]]  (Abnormal) Collected: 03/31/21 1611    Specimen: Blood Updated: 03/31/21 1623    Narrative:      The following orders were created for panel order Ketone Bodies, Serum (Not performed at [facility]).  Procedure                               Abnormality          Status                     ---------                               -----------         ------                     Acetone[[id number]]                      Abnormal            Final result                 Please view results for these tests on the individual orders.    Acetone [[id number]]  (Abnormal) Collected: 03/31/21 1611    Specimen: Blood Updated: 03/31/21 1623     Acetone Small    Comprehensive Metabolic Panel [[id number]]  (Abnormal) Collected: 03/31/21 1442    Specimen: Blood Updated: 03/31/21 1502     Glucose 674 mg/dL      BUN 51 mg/dL      Creatinine 2.65 mg/dL      Sodium 129 mmol/L      Potassium 6.0 mmol/L      Chloride 99 mmol/L      CO2 21.0 mmol/L      Calcium 8.4 mg/dL      Total Protein 6.5 g/dL      Albumin 2.90 g/dL      ALT (SGPT) 6 U/L      AST (SGOT) 10 U/L      Alkaline Phosphatase 260 U/L      Total Bilirubin <0.2 mg/dL      eGFR Non African Amer 18 mL/min/1.73      Globulin 3.6 gm/dL      A/G Ratio 0.8 g/dL      BUN/Creatinine Ratio 19.2     Anion Gap 9.0 mmol/L     Narrative:      GFR Normal >60  Chronic Kidney Disease <60  Kidney Failure <15      Lipase [[id number]]  (Abnormal) Collected: 03/31/21 1442    Specimen: Blood Updated: 03/31/21 1458     Lipase 152 U/L     CK [[id number]]  (Normal) Collected: 03/31/21 1442    Specimen: Blood Updated: 03/31/21 1458     Creatine Kinase 41 U/L     Troponin [[id number]]  (Normal) Collected: 03/31/21 1442    Specimen: Blood Updated: 03/31/21 1458     Troponin T 0.014 ng/mL     Narrative:      Troponin T Reference Range:  <= 0.03 ng/mL-   Negative for AMI  >0.03 ng/mL-     Abnormal for myocardial necrosis.  Clinicians would have to utilize clinical acumen, EKG, Troponin and serial changes to determine if it is an Acute Myocardial Infarction or myocardial injury due to an underlying chronic condition.       Results may be falsely decreased if patient taking Biotin.      Ethanol [[id number]] Collected: 03/31/21 1442    Specimen: Blood Updated: 03/31/21 1458      Ethanol <10 mg/dL      Ethanol % <0.010 %     Magnesium [[id number]]  (Normal) Collected: 03/31/21 1442    Specimen: Blood Updated: 03/31/21 1458     Magnesium 2.0 mg/dL     BNP [[id number]]  (Normal) Collected: 03/31/21 1442    Specimen: Blood Updated: 03/31/21 1456     proBNP 795.8 pg/mL     Narrative:      Among patients with dyspnea, NT-proBNP is highly sensitive for the detection of acute congestive heart failure. In addition NT-proBNP of <300 pg/ml effectively rules out acute congestive heart failure with 99% negative predictive value.    Results may be falsely decreased if patient taking Biotin.      Protime-INR [[id number]]  (Normal) Collected: 03/31/21 1258    Specimen: Blood Updated: 03/31/21 1449     Protime 13.7 Seconds      INR 1.01    Narrative:      Therapeutic range for most indications is 2.0-3.0 INR,  or 2.5-3.5 for mechanical heart valves.    CBC & Differential [[id number]]  (Abnormal) Collected: 03/31/21 1441    Specimen: Blood Updated: 03/31/21 1445    Narrative:      The following orders were created for panel order CBC & Differential.  Procedure                               Abnormality         Status                     ---------                               -----------         ------                     CBC Auto Differential[[id number]]        Abnormal            Final result                 Please view results for these tests on the individual orders.    CBC Auto Differential [[id number]]  (Abnormal) Collected: 03/31/21 1441    Specimen: Blood Updated: 03/31/21 1445     WBC 12.93 10*3/mm3      RBC 3.45 10*6/mm3      Hemoglobin 9.7 g/dL      Hematocrit 30.2 %      MCV 87.5 fL      MCH 28.1 pg      MCHC 32.1 g/dL      RDW 15.1 %      RDW-SD 48.4 fl      MPV 10.0 fL      Platelets 264 10*3/mm3      Neutrophil % 78.9 %      Lymphocyte % 13.8 %      Monocyte % 4.8 %      Eosinophil % 1.5 %      Basophil % 0.5 %      Immature Grans % 0.5 %      Neutrophils, Absolute 10.19 10*3/mm3       Lymphocytes, Absolute 1.79 10*3/mm3      Monocytes, Absolute 0.62 10*3/mm3      Eosinophils, Absolute 0.20 10*3/mm3      Basophils, Absolute 0.06 10*3/mm3      Immature Grans, Absolute 0.07 10*3/mm3      nRBC 0.0 /100 WBC     [place] Draw [[id number]] Collected: 03/31/21 1240    Specimen: Blood Updated: 03/31/21 1345    Narrative:      The following orders were created for panel order [place] Draw.  Procedure                               Abnormality         Status                     ---------                               -----------         ------                     Light Blue Top[[id number]]                                   Final result               Green Top (Gel)[[id number]]                                  Final result               Lavender Top[[id number]]                                     Final result               Gold Top - SST[[id number]]                                   Final result                 Please view results for these tests on the individual orders.    Lavender Top [[id number]] Collected: 03/31/21 1240    Specimen: Blood Updated: 03/31/21 1345     Extra Tube hold for add-on     Comment: Auto resulted       Green Top (Gel) [[id number]] Collected: 03/31/21 1240    Specimen: Blood Updated: 03/31/21 1345     Extra Tube Hold for add-ons.     Comment: Auto resulted.       Gold Top - SST [[id number]] Collected: 03/31/21 1240    Specimen: Blood Updated: 03/31/21 1345     Extra Tube Hold for add-ons.     Comment: Auto resulted.       Light Blue Top [[id number]] Collected: 03/31/21 1240    Specimen: Blood Updated: 03/31/21 1345     Extra Tube hold for add-on     Comment: Auto resulted       Urinalysis, Microscopic Only - Urine, Catheter [[id number]]  (Abnormal) Collected: 03/31/21 1258    Specimen: Urine, Catheter Updated: 03/31/21 1320     RBC, UA 21-30 /HPF      WBC, UA Too Numerous to Count /HPF      Bacteria, UA 1+ /HPF      Squamous Epithelial Cells, UA 3-5 /HPF      Hyaline Casts, UA None Seen /LPF       Methodology Manual Light Microscopy    Urine Drug Screen - Urine, Clean Catch [[id number]]  (Normal) Collected: 03/31/21 1258    Specimen: Urine, Clean Catch Updated: 03/31/21 1313     THC, Screen, Urine Negative     Phencyclidine (PCP), Urine Negative     Cocaine Screen, Urine Negative     Methamphetamine, Ur Negative     Opiate Screen Negative     Amphetamine Screen, Urine Negative     Benzodiazepine Screen, Urine Negative     Tricyclic Antidepressants Screen Negative     Methadone Screen, Urine Negative     Barbiturates Screen, Urine Negative     Oxycodone Screen, Urine Negative     Propoxyphene Screen Negative     Buprenorphine, Screen, Urine Negative    Narrative:      Cutoff For Drugs Screened:    Amphetamines               500 ng/ml  Barbiturates               200 ng/ml  Benzodiazepines            150 ng/ml  Cocaine                    150 ng/ml  Methadone                  200 ng/ml  Opiates                    100 ng/ml  Phencyclidine               25 ng/ml  THC                            50 ng/ml  Methamphetamine            500 ng/ml  Tricyclic Antidepressants  300 ng/ml  Oxycodone                  100 ng/ml  Propoxyphene               300 ng/ml  Buprenorphine               10 ng/ml    The normal value for all drugs tested is negative. This report includes unconfirmed screening results, with the cutoff values listed, to be used for medical treatment purposes only.  Unconfirmed results must not be used for non-medical purposes such as employment or legal testing.  Clinical consideration should be applied to any drug of abuse test, particularly when unconfirmed results are used.      Urinalysis With Microscopic If Indicated (No Culture) - Urine, Catheter [[id number]]  (Abnormal) Collected: 03/31/21 1258    Specimen: Urine, Catheter Updated: 03/31/21 1307     Color, UA Yellow     Appearance, UA Cloudy     pH, UA 6.0     Specific Gravity, UA 1.025     Glucose, UA >=1000 mg/dL (3+)     Ketones, UA Negative      Bilirubin, UA Negative     Blood, UA Small (1+)     Protein,  mg/dL (2+)     Leuk Esterase, UA Moderate (2+)     Nitrite, UA Positive     Urobilinogen, UA 0.2 E.U./dL           Imaging Results (Last 24 Hours)     Procedure Component Value Units Date/Time    XR Sacrum & Coccyx [[id number]] Collected: 03/31/21 1320     Updated: 03/31/21 1425    Narrative:      Procedure: XR SACRUM COCCYX 2 OR MORE VIEWS.    History: fall with pain.    Comparison: None    Findings:  Two frontal and two crosstable lateral views of the sacrum were  obtained. The exam is limited due to the patient's body habitus.  There is atherosclerotic calcification in the aorta and iliac  arteries. No definite acute fracture seen.      Impression:      Impression: Limited exam. No definite acute fracture seen.    Electronically signed by:  Umesh Parra MD  3/31/2021 2:24 PM CDT  Workstation: [id number]    XR Spine Lumbar 2 or 3 View [[id number]] Collected: 03/31/21 1319     Updated: 03/31/21 1409    Narrative:        PROCEDURE: AP and lateral lumbar spine    COMPARISON: No comparison    HISTORY: Injury, pain    FINDINGS:  Number of lumbar type vertebral bodies: 5  Alignment: Normal  Lumbar lordosis: Within normal limits  Vertebral bodies: Normal in height  Fractures: No radiographic evidence of acute fracture  Disc spaces: Disc space narrowing and osteophyte formation noted  at L2-L3  Facet joints: Degenerative changes at L4-L5 and L5-S1  Calcified abdominal aorta and iliac arteries.      Impression:      CONCLUSION:    Degenerative changes.    Electronically signed by:  Umesh Parra MD  3/31/2021 2:07 PM CDT  Workstation: [id number]    CT Head Without Contrast [[id number]] Collected: 03/31/21 1329     Updated: 03/31/21 1407    Narrative:      EXAMINATION:  CT SCAN OF THE HEAD WITHOUT INTRAVENOUS CONTRAST    CLINICAL INFORMATION:  fall - on Plavix    This exam was performed using radiation doses that are as low as  reasonably achievable  (ALARA).  This exam was performed according to our departmental dose  optimization program, which includes automated exposure control,  adjustment of the mA and/or KV according to patient size and/or  use of iterative reconstruction technique.    COMPARISON: None available.    TECHNIQUE:  Axial images from skull base to vertex.        FINDINGS:      There is no evidence of intracranial hemorrhage, parenchymal  mass, midline shift, or focal mass effect.  There is no hydrocephalus or effacement of the basilar cisterns.  There is no extra-axial hemorrhage or collection identified.  There is a tiny polyp or mucous retention cyst in the left  frontal sinus.  The mastoid air cells and visualized paranasal sinuses appear  otherwise clear.      Impression:      No evidence of intracranial hemorrhage, mass effect or large  acute infarct.      Electronically signed by:  Umesh Parra MD  3/31/2021 2:06 PM CDT  Workstation: [id number]    XR Chest 1 View [[id number]] Collected: 03/31/21 1311     Updated: 03/31/21 1350    Narrative:      PROCEDURE: Portable chest x-ray    TECHNIQUE: Single frontal view of the chest    COMPARISON: 1/13/2021    HISTORY: altered mental status    FINDINGS:  Prominent bilateral excisional markings and a more pronounced  opacity in the right midlung field, possibly due to pneumonia,  lung contusion, and/or pulmonary edema. Sternal suture wires  appear unchanged. Cardiac silhouette is enlarged.      Impression:      Prominent bilateral excisional markings and a more pronounced  opacity in the right midlung field, possibly due to pneumonia,  lung contusion, and/or pulmonary edema.   Cardiac silhouette is enlarged.    Electronically signed by:  Umesh Parra MD  3/31/2021 1:49 PM CDT  Workstation: [id number]            I reviewed the patient's new clinical results.    ASSESSMENT AND PLAN: This is a 61 y.o. 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  Can transition to D5 normal saline or D5 half-normal saline when blood glucose less than 250 mg/dl  -CMP every 2 hours.  Can transition to every 4 hours in the a.m.  -Replete potassium as needed  -Once glycemic control achieved, transition from insulin infusion to subcutaneous insulin     • Acute cystitis [N30.00]  Unknown     Priority: High     -IV Rocephin 3-day course  -Follow urine culture     • Metabolic acidosis [E87.2]  Unknown     -Underlying HHS  -No anion gap     • Hyponatremia [E87.1]  Unknown     -IVF     • Hyperkalemia [E87.5]  Unknown     -Likely falsely elevated due to HHS  -CMP every 2 hours  -We will monitor and replace         • Hypocalcemia [E83.51]  Unknown     Results from last 7 days   Lab Units 03/31/21  1949 03/31/21  1442   CALCIUM mg/dL 7.7* 8.4*   ALBUMIN g/dL  --  2.90*   ~8.6 mg/dL corrected     • Anemia [D64.9]  Unknown     -Possibly anemia of chronic disease  -We will obtain iron studies, folate, B12, stool occult test  -Continue home ferrous sulfate       • Acute renal failure superimposed on chronic kidney disease (CMS/[facility]) [N17.9, N18.9]  Unknown     Results from last 7 days   Lab Units 03/31/21  1442   CREATININE mg/dL 2.65*   -Baseline creatinine 1.9  -Continue IV fluids  -Hold nephrotoxic drugs      • Chronic obstructive lung disease (CMS/[facility]) [J44.9]  Yes     -O2 supplementation  -Home inhalers as needed  -DuoNebs  -CPAP at night     • GERD (gastroesophageal reflux disease) [K21.9]  Yes     - IV protonix qday     • Coronary artery disease [I25.10]  Unknown     -Continue home meds     • Current smoker [F17.200]  Yes     -Patch         DVT prophylaxis: Heparin     Yamileth Slater and I have discussed pain goals for this hospitalization after reviewing her current clinical condition, medical  history and prior pain experiences.  The goal is to keep the pain level <2.  To help achieve this, I plan to use prn tylenol.    JULIAN reviewed via PDMP and consistent with patient reported medications.    Expected Length of Stay: 1-2 days    I discussed the patient's findings and my recommendations with patient.     Yadira Delarosa MD is the attending on record at time of admission, She is aware of the patient's status and agrees with the above history and physical.              This document has been electronically signed by Carline Coffey MD on March 31, 2021 21:10 CDT          Electronically signed by Yadira Delarosa MD at 04/01/21 0854

## 2021-04-08 ENCOUNTER — APPOINTMENT (OUTPATIENT)
Dept: GENERAL RADIOLOGY | Facility: HOSPITAL | Age: 61
End: 2021-04-08

## 2021-04-08 ENCOUNTER — APPOINTMENT (OUTPATIENT)
Dept: ULTRASOUND IMAGING | Facility: HOSPITAL | Age: 61
End: 2021-04-08

## 2021-04-08 ENCOUNTER — APPOINTMENT (OUTPATIENT)
Dept: INTERVENTIONAL RADIOLOGY/VASCULAR | Facility: HOSPITAL | Age: 61
End: 2021-04-08

## 2021-04-08 PROBLEM — E87.5 HYPERKALEMIA: Status: RESOLVED | Noted: 2021-03-31 | Resolved: 2021-04-08

## 2021-04-08 PROBLEM — E83.51 HYPOCALCEMIA: Status: RESOLVED | Noted: 2021-03-31 | Resolved: 2021-04-08

## 2021-04-08 PROBLEM — E87.1 HYPONATREMIA: Status: RESOLVED | Noted: 2021-03-31 | Resolved: 2021-04-08

## 2021-04-08 PROBLEM — E87.20 METABOLIC ACIDOSIS: Status: RESOLVED | Noted: 2021-03-31 | Resolved: 2021-04-08

## 2021-04-08 PROBLEM — N30.00 ACUTE CYSTITIS: Status: RESOLVED | Noted: 2021-03-31 | Resolved: 2021-04-08

## 2021-04-08 LAB
ACETONE BLD QL: NEGATIVE
ALBUMIN SERPL-MCNC: 3.1 G/DL (ref 3.5–5.2)
ALBUMIN/GLOB SERPL: 0.7 G/DL
ALP SERPL-CCNC: 198 U/L (ref 39–117)
ALT SERPL W P-5'-P-CCNC: 12 U/L (ref 1–33)
AMMONIA BLD-SCNC: 13 UMOL/L (ref 11–51)
ANION GAP SERPL CALCULATED.3IONS-SCNC: 8 MMOL/L (ref 5–15)
ARTERIAL PATENCY WRIST A: ABNORMAL
AST SERPL-CCNC: 18 U/L (ref 1–32)
ATMOSPHERIC PRESS: 739 MMHG
BASE EXCESS BLDA CALC-SCNC: -3.3 MMOL/L (ref 0–2)
BASOPHILS # BLD AUTO: 0.08 10*3/MM3 (ref 0–0.2)
BASOPHILS NFR BLD AUTO: 0.6 % (ref 0–1.5)
BDY SITE: ABNORMAL
BILIRUB SERPL-MCNC: <0.2 MG/DL (ref 0–1.2)
BUN SERPL-MCNC: 27 MG/DL (ref 8–23)
BUN/CREAT SERPL: 13.4 (ref 7–25)
CALCIUM SPEC-SCNC: 8.8 MG/DL (ref 8.6–10.5)
CHLORIDE SERPL-SCNC: 101 MMOL/L (ref 98–107)
CO2 SERPL-SCNC: 23 MMOL/L (ref 22–29)
CREAT SERPL-MCNC: 2.01 MG/DL (ref 0.57–1)
DEPRECATED RDW RBC AUTO: 50.1 FL (ref 37–54)
EOSINOPHIL # BLD AUTO: 0.32 10*3/MM3 (ref 0–0.4)
EOSINOPHIL NFR BLD AUTO: 2.4 % (ref 0.3–6.2)
ERYTHROCYTE [DISTWIDTH] IN BLOOD BY AUTOMATED COUNT: 15.8 % (ref 12.3–15.4)
GFR SERPL CREATININE-BSD FRML MDRD: 25 ML/MIN/1.73
GLOBULIN UR ELPH-MCNC: 4.3 GM/DL
GLUCOSE BLDC GLUCOMTR-MCNC: 205 MG/DL (ref 70–130)
GLUCOSE BLDC GLUCOMTR-MCNC: 216 MG/DL (ref 70–130)
GLUCOSE BLDC GLUCOMTR-MCNC: 264 MG/DL (ref 70–130)
GLUCOSE BLDC GLUCOMTR-MCNC: 279 MG/DL (ref 70–130)
GLUCOSE BLDC GLUCOMTR-MCNC: 290 MG/DL (ref 70–130)
GLUCOSE BLDC GLUCOMTR-MCNC: 351 MG/DL (ref 70–130)
GLUCOSE BLDC GLUCOMTR-MCNC: 499 MG/DL (ref 70–130)
GLUCOSE BLDC GLUCOMTR-MCNC: 518 MG/DL (ref 70–130)
GLUCOSE SERPL-MCNC: 287 MG/DL (ref 65–99)
HCO3 BLDA-SCNC: 22.1 MMOL/L (ref 20–26)
HCT VFR BLD AUTO: 33.4 % (ref 34–46.6)
HGB BLD-MCNC: 10.6 G/DL (ref 12–15.9)
IMM GRANULOCYTES # BLD AUTO: 0.35 10*3/MM3 (ref 0–0.05)
IMM GRANULOCYTES NFR BLD AUTO: 2.6 % (ref 0–0.5)
LYMPHOCYTES # BLD AUTO: 3.25 10*3/MM3 (ref 0.7–3.1)
LYMPHOCYTES NFR BLD AUTO: 24.6 % (ref 19.6–45.3)
Lab: ABNORMAL
MCH RBC QN AUTO: 28.5 PG (ref 26.6–33)
MCHC RBC AUTO-ENTMCNC: 31.7 G/DL (ref 31.5–35.7)
MCV RBC AUTO: 89.8 FL (ref 79–97)
MODALITY: ABNORMAL
MONOCYTES # BLD AUTO: 0.74 10*3/MM3 (ref 0.1–0.9)
MONOCYTES NFR BLD AUTO: 5.6 % (ref 5–12)
NEUTROPHILS NFR BLD AUTO: 64.2 % (ref 42.7–76)
NEUTROPHILS NFR BLD AUTO: 8.47 10*3/MM3 (ref 1.7–7)
NRBC BLD AUTO-RTO: 0 /100 WBC (ref 0–0.2)
PCO2 BLDA: 40.5 MM HG (ref 35–45)
PH BLDA: 7.35 PH UNITS (ref 7.35–7.45)
PLATELET # BLD AUTO: 368 10*3/MM3 (ref 140–450)
PMV BLD AUTO: 9.1 FL (ref 6–12)
PO2 BLDA: 66 MM HG (ref 83–108)
POTASSIUM SERPL-SCNC: 4.9 MMOL/L (ref 3.5–5.2)
PROT SERPL-MCNC: 7.4 G/DL (ref 6–8.5)
RBC # BLD AUTO: 3.72 10*6/MM3 (ref 3.77–5.28)
SAO2 % BLDCOA: 92.5 % (ref 94–99)
SODIUM SERPL-SCNC: 132 MMOL/L (ref 136–145)
VENTILATOR MODE: ABNORMAL
WBC # BLD AUTO: 13.21 10*3/MM3 (ref 3.4–10.8)

## 2021-04-08 PROCEDURE — 63710000001 INSULIN DETEMIR PER 5 UNITS: Performed by: STUDENT IN AN ORGANIZED HEALTH CARE EDUCATION/TRAINING PROGRAM

## 2021-04-08 PROCEDURE — C1751 CATH, INF, PER/CENT/MIDLINE: HCPCS

## 2021-04-08 PROCEDURE — 85025 COMPLETE CBC W/AUTO DIFF WBC: CPT | Performed by: CHIROPRACTOR

## 2021-04-08 PROCEDURE — 82962 GLUCOSE BLOOD TEST: CPT

## 2021-04-08 PROCEDURE — 63710000001 INSULIN ASPART PER 5 UNITS: Performed by: STUDENT IN AN ORGANIZED HEALTH CARE EDUCATION/TRAINING PROGRAM

## 2021-04-08 PROCEDURE — 36600 WITHDRAWAL OF ARTERIAL BLOOD: CPT

## 2021-04-08 PROCEDURE — 63710000001 ONDANSETRON ODT 4 MG TABLET DISPERSIBLE: Performed by: CHIROPRACTOR

## 2021-04-08 PROCEDURE — 94799 UNLISTED PULMONARY SVC/PX: CPT

## 2021-04-08 PROCEDURE — 82803 BLOOD GASES ANY COMBINATION: CPT

## 2021-04-08 PROCEDURE — 73080 X-RAY EXAM OF ELBOW: CPT

## 2021-04-08 PROCEDURE — 82009 KETONE BODYS QUAL: CPT | Performed by: STUDENT IN AN ORGANIZED HEALTH CARE EDUCATION/TRAINING PROGRAM

## 2021-04-08 PROCEDURE — 25010000002 HEPARIN (PORCINE) PER 1000 UNITS: Performed by: CHIROPRACTOR

## 2021-04-08 PROCEDURE — 76937 US GUIDE VASCULAR ACCESS: CPT

## 2021-04-08 PROCEDURE — 71046 X-RAY EXAM CHEST 2 VIEWS: CPT

## 2021-04-08 PROCEDURE — 94760 N-INVAS EAR/PLS OXIMETRY 1: CPT

## 2021-04-08 PROCEDURE — 82140 ASSAY OF AMMONIA: CPT | Performed by: STUDENT IN AN ORGANIZED HEALTH CARE EDUCATION/TRAINING PROGRAM

## 2021-04-08 PROCEDURE — 71045 X-RAY EXAM CHEST 1 VIEW: CPT

## 2021-04-08 PROCEDURE — 99232 SBSQ HOSP IP/OBS MODERATE 35: CPT | Performed by: STUDENT IN AN ORGANIZED HEALTH CARE EDUCATION/TRAINING PROGRAM

## 2021-04-08 PROCEDURE — 36410 VNPNXR 3YR/> PHY/QHP DX/THER: CPT

## 2021-04-08 PROCEDURE — 80053 COMPREHEN METABOLIC PANEL: CPT | Performed by: CHIROPRACTOR

## 2021-04-08 RX ORDER — GABAPENTIN 400 MG/1
400 CAPSULE ORAL 3 TIMES DAILY
Status: DISCONTINUED | OUTPATIENT
Start: 2021-04-08 | End: 2021-04-12 | Stop reason: HOSPADM

## 2021-04-08 RX ORDER — SODIUM CHLORIDE 9 MG/ML
125 INJECTION, SOLUTION INTRAVENOUS CONTINUOUS
Status: DISCONTINUED | OUTPATIENT
Start: 2021-04-08 | End: 2021-04-08

## 2021-04-08 RX ADMIN — HEPARIN SODIUM 5000 UNITS: 5000 INJECTION INTRAVENOUS; SUBCUTANEOUS at 21:59

## 2021-04-08 RX ADMIN — IPRATROPIUM BROMIDE AND ALBUTEROL SULFATE 3 ML: 2.5; .5 SOLUTION RESPIRATORY (INHALATION) at 19:12

## 2021-04-08 RX ADMIN — MONTELUKAST SODIUM 10 MG: 10 TABLET, COATED ORAL at 21:57

## 2021-04-08 RX ADMIN — NYSTATIN: 100000 POWDER TOPICAL at 15:06

## 2021-04-08 RX ADMIN — OXYBUTYNIN CHLORIDE 5 MG: 5 TABLET, EXTENDED RELEASE ORAL at 09:42

## 2021-04-08 RX ADMIN — SODIUM BICARBONATE 1300 MG: 650 TABLET ORAL at 09:42

## 2021-04-08 RX ADMIN — INSULIN DETEMIR 35 UNITS: 100 INJECTION, SOLUTION SUBCUTANEOUS at 09:46

## 2021-04-08 RX ADMIN — SODIUM CHLORIDE, PRESERVATIVE FREE 10 ML: 5 INJECTION INTRAVENOUS at 09:42

## 2021-04-08 RX ADMIN — SODIUM CHLORIDE, PRESERVATIVE FREE 10 ML: 5 INJECTION INTRAVENOUS at 21:57

## 2021-04-08 RX ADMIN — NYSTATIN: 100000 POWDER TOPICAL at 21:58

## 2021-04-08 RX ADMIN — INSULIN ASPART 20 UNITS: 100 INJECTION, SOLUTION INTRAVENOUS; SUBCUTANEOUS at 17:21

## 2021-04-08 RX ADMIN — ONDANSETRON 4 MG: 4 TABLET, ORALLY DISINTEGRATING ORAL at 06:54

## 2021-04-08 RX ADMIN — INSULIN ASPART 8 UNITS: 100 INJECTION, SOLUTION INTRAVENOUS; SUBCUTANEOUS at 17:21

## 2021-04-08 RX ADMIN — GABAPENTIN 400 MG: 400 CAPSULE ORAL at 21:59

## 2021-04-08 RX ADMIN — INSULIN ASPART 20 UNITS: 100 INJECTION, SOLUTION INTRAVENOUS; SUBCUTANEOUS at 11:04

## 2021-04-08 RX ADMIN — IPRATROPIUM BROMIDE AND ALBUTEROL SULFATE 3 ML: 2.5; .5 SOLUTION RESPIRATORY (INHALATION) at 07:34

## 2021-04-08 RX ADMIN — SODIUM BICARBONATE 1300 MG: 650 TABLET ORAL at 21:58

## 2021-04-08 RX ADMIN — CALCIUM CARBONATE (ANTACID) CHEW TAB 500 MG 2 TABLET: 500 CHEW TAB at 11:02

## 2021-04-08 RX ADMIN — ONDANSETRON 4 MG: 4 TABLET, ORALLY DISINTEGRATING ORAL at 22:43

## 2021-04-08 RX ADMIN — METOPROLOL TARTRATE 100 MG: 50 TABLET, FILM COATED ORAL at 21:57

## 2021-04-08 RX ADMIN — PANTOPRAZOLE SODIUM 40 MG: 40 TABLET, DELAYED RELEASE ORAL at 21:58

## 2021-04-08 RX ADMIN — SODIUM CHLORIDE, PRESERVATIVE FREE 10 ML: 5 INJECTION INTRAVENOUS at 21:59

## 2021-04-08 RX ADMIN — NYSTATIN: 100000 POWDER TOPICAL at 09:43

## 2021-04-08 RX ADMIN — GABAPENTIN 800 MG: 400 CAPSULE ORAL at 09:42

## 2021-04-08 RX ADMIN — CLINDAMYCIN HYDROCHLORIDE 600 MG: 150 CAPSULE ORAL at 22:11

## 2021-04-08 RX ADMIN — INSULIN ASPART 20 UNITS: 100 INJECTION, SOLUTION INTRAVENOUS; SUBCUTANEOUS at 09:43

## 2021-04-08 RX ADMIN — HEPARIN SODIUM 5000 UNITS: 5000 INJECTION INTRAVENOUS; SUBCUTANEOUS at 06:25

## 2021-04-08 RX ADMIN — POTASSIUM CHLORIDE 10 MEQ: 750 CAPSULE, EXTENDED RELEASE ORAL at 09:42

## 2021-04-08 RX ADMIN — INSULIN ASPART 12 UNITS: 100 INJECTION, SOLUTION INTRAVENOUS; SUBCUTANEOUS at 09:42

## 2021-04-08 RX ADMIN — CALCIUM CARBONATE (ANTACID) CHEW TAB 500 MG 2 TABLET: 500 CHEW TAB at 22:43

## 2021-04-08 RX ADMIN — CLINDAMYCIN HYDROCHLORIDE 600 MG: 150 CAPSULE ORAL at 13:41

## 2021-04-08 RX ADMIN — IPRATROPIUM BROMIDE AND ALBUTEROL SULFATE 3 ML: 2.5; .5 SOLUTION RESPIRATORY (INHALATION) at 11:07

## 2021-04-08 RX ADMIN — INSULIN DETEMIR 35 UNITS: 100 INJECTION, SOLUTION SUBCUTANEOUS at 22:03

## 2021-04-08 RX ADMIN — ATORVASTATIN CALCIUM 40 MG: 40 TABLET, FILM COATED ORAL at 09:42

## 2021-04-08 RX ADMIN — CLINDAMYCIN HYDROCHLORIDE 600 MG: 150 CAPSULE ORAL at 06:25

## 2021-04-08 RX ADMIN — CLOPIDOGREL BISULFATE 75 MG: 75 TABLET ORAL at 09:42

## 2021-04-08 RX ADMIN — METOPROLOL TARTRATE 100 MG: 50 TABLET, FILM COATED ORAL at 09:42

## 2021-04-08 RX ADMIN — TRAMADOL HYDROCHLORIDE 50 MG: 50 TABLET, FILM COATED ORAL at 15:06

## 2021-04-08 RX ADMIN — ISOSORBIDE MONONITRATE 30 MG: 30 TABLET, EXTENDED RELEASE ORAL at 09:42

## 2021-04-08 RX ADMIN — SODIUM CHLORIDE 1000 ML: 9 INJECTION, SOLUTION INTRAVENOUS at 11:03

## 2021-04-08 NOTE — PLAN OF CARE
Goal Outcome Evaluation:  Plan of Care Reviewed With: patient  Progress: improving  Outcome Summary: Patient drowsiness improved throughout shift; pt oriented and alert; Midline placed, IV fluids dc by nephrology; vss

## 2021-04-08 NOTE — SIGNIFICANT NOTE
Per pt's family, pt is not feeling well today and defers OT at this time.   04/08/21 1102   OTHER   Discipline occupational therapy assistant   Rehab Time/Intention   Session Not Performed patient/family declined treatment      The patient is a 47y Male complaining of arm pain/injury.

## 2021-04-08 NOTE — PLAN OF CARE
Goal Outcome Evaluation:     Progress: improving  Outcome Summary: Patient complaining of back pain. PRN tramadol given and effective. Patient currently resting. VSS.

## 2021-04-08 NOTE — PROGRESS NOTES
FAMILY MEDICINE DAILY PROGRESS NOTE  NAME: Yamileth Slater  : 1960  MRN: [id number]     LOS: 7 days     PROVIDER OF SERVICE: Alejandra Altamirano MD    Chief Complaint: Type 2 diabetes mellitus with hyperosmolar hyperglycemic state (HHS) (CMS/[facility])    Subjective:     Interval History:  History taken from: patient chart family    VSS. Patient reporting feeling generally unwell. Endorses acid reflux, currently on Protonix. Endorsing R elbow pain, No warmth, swelling, or erythema. Had a styloid fracture last month but repeat x-ray does not show fracture. Cr improving. Sugars somewhat improved but requiring moderate amount of SSI.    Review of Systems:   Review of Systems   Constitutional: Negative for activity change, appetite change, fatigue and fever.   HENT: Negative for congestion, rhinorrhea, sinus pain and sore throat.    Eyes: Negative for pain, redness and visual disturbance.   Respiratory: Negative for cough, shortness of breath and wheezing.    Cardiovascular: Negative for chest pain, palpitations and leg swelling.   Gastrointestinal: Positive for abdominal pain (epigastric) and nausea. Negative for constipation, diarrhea and vomiting.   Genitourinary: Negative for dysuria and flank pain.   Musculoskeletal: Positive for arthralgias (R elbow). Negative for back pain, joint swelling and myalgias.   Skin: Negative for color change, pallor and rash.   Neurological: Negative for dizziness, light-headedness and headaches.       Objective:     Vital Signs  Temp:  [96.6 °F (35.9 °C)-97.1 °F (36.2 °C)] 97.1 °F (36.2 °C)  Heart Rate:  [67-79] 74  Resp:  [16-20] 18  BP: (141-146)/(63-77) 142/63    Physical Exam  Physical Exam  Vitals and nursing note reviewed.   Constitutional:       General: She is not in acute distress.     Appearance: She is well-developed. She is morbidly obese. She is ill-appearing. She is not diaphoretic.   HENT:      Head: Normocephalic and atraumatic.      Right Ear: Hearing normal.       Left Ear: Hearing normal.   Eyes:      General: Lids are normal.      Conjunctiva/sclera: Conjunctivae normal.   Cardiovascular:      Rate and Rhythm: Normal rate and regular rhythm.      Heart sounds: Normal heart sounds. No murmur heard.     Pulmonary:      Effort: Pulmonary effort is normal. No respiratory distress.      Breath sounds: Normal breath sounds. No wheezing or rales.   Abdominal:      General: Bowel sounds are normal. There is no distension.      Palpations: Abdomen is soft.      Tenderness: There is no abdominal tenderness.   Musculoskeletal:         General: No tenderness or deformity. Normal range of motion.   Skin:     General: Skin is warm and dry.      Coloration: Skin is not pale.      Findings: No erythema or rash.   Neurological:      Mental Status: She is alert and oriented to person, place, and time. She is not disoriented.         Medication Review    Current Facility-Administered Medications:   •  acetaminophen (TYLENOL) tablet 650 mg, 650 mg, Oral, Q6H PRN, Huy Santa MD, 650 mg at 04/07/21 0251  •  albuterol (PROVENTIL) nebulizer solution 0.083% 2.5 mg/3mL, 2.5 mg, Nebulization, Q4H PRN, Huy Santa MD  •  atorvastatin (LIPITOR) tablet 40 mg, 40 mg, Oral, Daily, Huy Santa MD, 40 mg at 04/07/21 0812  •  calcium carbonate (TUMS) chewable tablet 500 mg (200 mg elemental), 2 tablet, Oral, BID PRN, Nirmal Calvillo MD, 2 tablet at 04/07/21 1121  •  clindamycin (CLEOCIN) capsule 600 mg, 600 mg, Oral, Q8H, Nirmal Calvillo MD, 600 mg at 04/08/21 0625  •  clopidogrel (PLAVIX) tablet 75 mg, 75 mg, Oral, Daily, Huy Santa MD, 75 mg at 04/07/21 0813  •  cyclobenzaprine (FLEXERIL) tablet 10 mg, 10 mg, Oral, BID PRN, Huy Santa MD, 10 mg at 04/05/21 0003  •  dextrose (D50W) 25 g/ 50mL Intravenous Solution 25 g, 25 g, Intravenous, Q15 Min PRN, Huy Santa MD  •  dextrose (GLUTOSE) oral gel 15 g, 15 g, Oral, Q15 Min PRN, Huy Santa MD  •  gabapentin  (NEURONTIN) capsule 800 mg, 800 mg, Oral, TID, Nirmal Calvillo MD, 800 mg at 04/07/21 2142  •  glucagon (human recombinant) (GLUCAGEN DIAGNOSTIC) injection 1 mg, 1 mg, Subcutaneous, Q15 Min PRN, Huy Santa MD  •  heparin (porcine) 5000 UNIT/ML injection 5,000 Units, 5,000 Units, Subcutaneous, Q8H, Huy Santa MD, 5,000 Units at 04/08/21 0625  •  insulin aspart (novoLOG) injection 0-24 Units, 0-24 Units, Subcutaneous, TID AC, Haily Mcneil MD, 4 Units at 04/07/21 1816  •  insulin aspart (novoLOG) injection 20 Units, 20 Units, Subcutaneous, TID With Meals, Nirmal Calvillo MD, 20 Units at 04/07/21 1816  •  insulin detemir (LEVEMIR) injection 35 Units, 35 Units, Subcutaneous, Q12H, Nirmal Calvillo MD, 35 Units at 04/07/21 2145  •  ipratropium-albuterol (DUO-NEB) nebulizer solution 3 mL, 3 mL, Nebulization, 4x Daily - RT, Huy Santa MD, 3 mL at 04/08/21 0734  •  isosorbide mononitrate (IMDUR) 24 hr tablet 30 mg, 30 mg, Oral, Daily, Huy Santa MD, 30 mg at 04/07/21 0813  •  metoprolol tartrate (LOPRESSOR) tablet 100 mg, 100 mg, Oral, Q12H, Huy Santa MD, 100 mg at 04/07/21 2143  •  montelukast (SINGULAIR) tablet 10 mg, 10 mg, Oral, Nightly, Huy Santa MD, 10 mg at 04/07/21 2142  •  nitroglycerin (NITROSTAT) SL tablet 0.4 mg, 0.4 mg, Sublingual, PRN, Huy Santa MD  •  nystatin (MYCOSTATIN) powder, , Topical, TID, Huy Santa MD, Given at 04/07/21 2142  •  ondansetron ODT (ZOFRAN-ODT) disintegrating tablet 4 mg, 4 mg, Oral, Q8H PRN, Huy Santa MD, 4 mg at 04/08/21 0654  •  oxybutynin XL (DITROPAN-XL) 24 hr tablet 5 mg, 5 mg, Oral, Daily, Huy Santa MD, 5 mg at 04/07/21 0812  •  pantoprazole (PROTONIX) EC tablet 40 mg, 40 mg, Oral, Nightly, Huy Santa MD, 40 mg at 04/07/21 2143  •  potassium chloride (MICRO-K) CR capsule 10 mEq, 10 mEq, Oral, Daily, Huy Santa MD, 10 mEq at 04/07/21 0813  •  promethazine (PHENERGAN) tablet 25 mg, 25 mg, Oral, Q6H PRN,  Huy Santa MD  •  sodium bicarbonate tablet 1,300 mg, 1,300 mg, Oral, BID, Janice Chavez, APRN, 1,300 mg at 04/07/21 2159  •  sodium chloride 0.9 % flush 10 mL, 10 mL, Intravenous, PRN, Huy Santa MD  •  [COMPLETED] Insert peripheral IV, , , Once **AND** sodium chloride 0.9 % flush 10 mL, 10 mL, Intravenous, PRN, Huy Santa MD  •  sodium chloride 0.9 % flush 10 mL, 10 mL, Intravenous, Q12H, Huy Santa MD, 10 mL at 04/05/21 0937  •  sodium chloride 0.9 % flush 10 mL, 10 mL, Intravenous, PRN, Huy Santa MD  •  sodium chloride 0.9 % flush 10 mL, 10 mL, Intravenous, Q12H, Huy Santa MD, 10 mL at 04/07/21 2100  •  sodium chloride 0.9 % flush 10 mL, 10 mL, Intravenous, Q12H, Huy Santa MD, 10 mL at 04/07/21 2144  •  sodium chloride 0.9 % flush 10 mL, 10 mL, Intravenous, PRN, Huy Santa MD  •  traMADol (ULTRAM) tablet 50 mg, 50 mg, Oral, Q8H PRN, Nirmal Calvillo MD, 50 mg at 04/07/21 2143     Diagnostic Data    Lab Results (last 24 hours)     Procedure Component Value Units Date/Time    POC Glucose Once [[id number]]  (Abnormal) Collected: 04/06/21 1021    Specimen: Blood Updated: 04/08/21 0817     Glucose 518 mg/dL      Comment: RN NotifiedOperator: [id number] KEVIN BAHENAYMeter ID: [id number]       POC Glucose Once [[id number]]  (Abnormal) Collected: 04/06/21 1021    Specimen: Blood Updated: 04/08/21 0817     Glucose 499 mg/dL      Comment: : [id number] KEVINJARAD CORTEZFANYMeter ID: [id number]       Comprehensive Metabolic Panel [[id number]]  (Abnormal) Collected: 04/08/21 0648    Specimen: Blood Updated: 04/08/21 0809     Glucose 287 mg/dL      BUN 27 mg/dL      Creatinine 2.01 mg/dL      Sodium 132 mmol/L      Potassium 4.9 mmol/L      Chloride 101 mmol/L      CO2 23.0 mmol/L      Calcium 8.8 mg/dL      Total Protein 7.4 g/dL      Albumin 3.10 g/dL      ALT (SGPT) 12 U/L      AST (SGOT) 18 U/L      Alkaline Phosphatase 198 U/L      Total Bilirubin <0.2 mg/dL      eGFR Non   Amer 25 mL/min/1.73      Globulin 4.3 gm/dL      A/G Ratio 0.7 g/dL      BUN/Creatinine Ratio 13.4     Anion Gap 8.0 mmol/L     Narrative:      GFR Normal >60  Chronic Kidney Disease <60  Kidney Failure <15      CBC & Differential [[id number]]  (Abnormal) Collected: 04/08/21 0648    Specimen: Blood Updated: 04/08/21 0752    Narrative:      The following orders were created for panel order CBC & Differential.  Procedure                               Abnormality         Status                     ---------                               -----------         ------                     CBC Auto Differential[[id number]]        Abnormal            Final result                 Please view results for these tests on the individual orders.    CBC Auto Differential [[id number]]  (Abnormal) Collected: 04/08/21 0648    Specimen: Blood Updated: 04/08/21 0752     WBC 13.21 10*3/mm3      RBC 3.72 10*6/mm3      Hemoglobin 10.6 g/dL      Hematocrit 33.4 %      MCV 89.8 fL      MCH 28.5 pg      MCHC 31.7 g/dL      RDW 15.8 %      RDW-SD 50.1 fl      MPV 9.1 fL      Platelets 368 10*3/mm3      Neutrophil % 64.2 %      Lymphocyte % 24.6 %      Monocyte % 5.6 %      Eosinophil % 2.4 %      Basophil % 0.6 %      Immature Grans % 2.6 %      Neutrophils, Absolute 8.47 10*3/mm3      Lymphocytes, Absolute 3.25 10*3/mm3      Monocytes, Absolute 0.74 10*3/mm3      Eosinophils, Absolute 0.32 10*3/mm3      Basophils, Absolute 0.08 10*3/mm3      Immature Grans, Absolute 0.35 10*3/mm3      nRBC 0.0 /100 WBC     POC Glucose Once [[id number]]  (Abnormal) Collected: 04/08/21 0613    Specimen: Blood Updated: 04/08/21 0710     Glucose 279 mg/dL      Comment: RN NotifiedOperator: [id number] PATRICIA POLLOCKMeter ID: [id number]       POC Glucose Once [[id number]]  (Abnormal) Collected: 04/07/21 1636    Specimen: Blood Updated: 04/07/21 1651     Glucose 151 mg/dL      Comment: RN NotifiedOperator: [id number] BASILIA MOTAMeter ID: [id number]               Imaging Results (Last 24 Hours)     Procedure Component Value Units Date/Time    XR Elbow 3+ View Right [[id number]] Resulted: 04/08/21 0851     Updated: 04/08/21 0853    XR Chest 1 View [[id number]] Collected: 04/08/21 0613     Updated: 04/08/21 0643    Narrative:      EXAM: XR CHEST 1 VIEW    HISTORY: pneumonia, R73.9 Hyperglycemia, unspecified N39.0  Urinary tract infection, site not specified Z74.09 Other reduced  mobility Z78.9 Other specified health status Z74.09 Other reduced  mobility    COMPARISON: 4/5/2021    TECHNIQUE:   Portable view of the chest.    FINDINGS:   Mild pulmonary vascular congestion with improvement since prior.  Stable cardiomegaly. Multiple sternal sutures in the midline.  The mediastinal contours are within normal limits. .  No evidence of mediastinal shift or pneumothorax.  Unremarkable visualized osseous structures.      Impression:      1.  Mild pulmonary vascular congestion with improvement since  prior.  2.  Stable cardiomegaly.          Electronically signed by:  Richmond Bernal MD  4/8/2021 6:42 AM CDT  Workstation: [id number]          I reviewed the patient's new clinical results.    Assessment/Plan:     Active Hospital Problems    Diagnosis    • **Type 2 diabetes mellitus with hyperosmolar hyperglycemic state (HHS) (CMS/[facility])      -Glucose monitoring  -Levemir 35u bid, Aspart 20 units w/meals, ISS   +32 units SSI in last 24 hours  -Consistent carb diet     • Acute renal failure superimposed on chronic kidney disease (CMS/[facility])      -Hold nephrotoxic drugs  -Nephrology consulted and appreciate recommendations  -Improving  -Baseline: 1.8-2     • MRSA infection    • Cutaneous candidiasis      - Nystatin     • Community acquired pneumonia of right middle lobe of lung      - Confirmed on CXR. CXR 4/5 unremarkable   -Aspiration pneumonitis now suspected.  Patient MRSA positive in nares.  -IV clindamycin 300 mg 3 times daily finish out 10-day course of antibiotic treatment. Started 4/3/21.  Switched to Clindamycin 600mg q8h 4/5/21     • Hyponatremia      -Normal once corrected for hyperglycemia     • Anemia      -Iron studies indicative of mixed iron deficiency and chronic disease  -Continue home ferrous sulfate       • Chronic midline low back pain without sciatica      - Tramadol PRN     • Chronic obstructive lung disease (CMS/HCC)      -O2 supplementation  -Home inhalers as needed  -DuoNebs  -CPAP at night     • GERD (gastroesophageal reflux disease)      - IV protonix qday  - Tums     • Coronary artery disease      -Continue home meds     • Current smoker      -Patch           DVT prophylaxis: Heparin  Code Status and Medical Interventions:   Ordered at: 03/31/21 1811     Code Status:    CPR     Medical Interventions (Level of Support Prior to Arrest):    Full       Plan for disposition:Where: rehab and When:  1-2 days      Time: 16 mins       Alejandra Altamirano M.D. PGY2  [facility]  [address] [place] [address]  Office: [phone]  This document has been electronically signed by Alejandra Altamirano MD on April 8, 2021 09:05 CDT

## 2021-04-08 NOTE — SIGNIFICANT NOTE
04/08/21 1431   OTHER   Discipline physical therapist   Rehab Time/Intention   Session Not Performed patient unavailable for treatment  (pt gone to xray x 2, work on IV x 1 and now gone to get midline; to recheck tomorrow)   Recommendation   PT - Next Appointment 04/09/21

## 2021-04-08 NOTE — PROGRESS NOTES
TWO PATIENT IDENTIFIERS WERE USED. THE PATIENT WAS DRAPED WITH A FULL BODY DRAPE AND THE PATIENT'S LEFT arM WAS PREPPED WITH CHLORA PREP. ULTRASOUND WAS USED TO LOCALIZE THELEFT CEPHALIC VEIN. SUBCUTANEOUS TISSUE AT THE CATHETER SITE WAS INFILTRATED WITH 2% LIDOCAINE. UNDER ULTRASOUND GUIDANCE, THE VEIN WAS ACCESSED WITH A 21 GAUGE  NEEDLE. AN 0.018 WIRE WAS THEN THREADED THROUGH THE NEEDLE. THE 21 GAUGE NEEDLE WAS REMOVED AND A 4 Egyptian SHEATH WAS PLACED OVER THE WIRE INTO THE VEIN.THE MIDLINE CATHETER WAS TRIMMED TO 20CM. THE MIDLINE CATHETER WAS THEN PLACED OVER THE WIRE INTO THE VEIN, THE SHEATH WAS PEELED AWAY, WIRE WAS REMOVED. CATHETER WAS FLUSHED WITH NORMAL SALINE AND CATHETER TIP APPLIED. BIOPATCH PLACED. CATHETER SECURED WITH STAT LOCK AND TEGADERM. PATIENT TOLERATED PROCEDURE WELL. THIS WAS DONE IN THE ANGIOSUITE      IMPRESSION:SUCCESSFUL PLACEMENT OF DUAL LUMEN MIDLINE.           Luisa Gar  4/8/2021  14:35 CDT

## 2021-04-08 NOTE — PROGRESS NOTES
"[facility]  [address] [place] [address]. [address]  T - [phone]   - [phone]     Progress Note          PATIENT  DEMOGRAPHICS   PATIENT NAME: Yamileth Slater                      PHYSICIAN: JmBRIDGETT Watson  : 1960  MRN: [id number]   LOS: 7 days    Patient Care Team:  Nirmal Calvillo MD as PCP - General (Family Medicine)  Subjective   SUBJECTIVE   Drowsy this morning, better now.       Objective   OBJECTIVE   Vital Signs  Temp:  [96.9 °F (36.1 °C)-97.2 °F (36.2 °C)] 96.9 °F (36.1 °C)  Heart Rate:  [68-79] 69  Resp:  [16-20] 18  BP: (141-159)/(63-78) 159/78    Flowsheet Rows        First Filed Value   Admission Height  157.5 cm (62\") Documented at 2021 1136   Admission Weight  125 kg (275 lb) Documented at 2021 1136             I/O last 3 completed shifts:  In: 4778.8 [P.O.:2880; I.V.:1898.8]  Out: 1750 [Urine:1750]    PHYSICAL EXAM    Physical Exam  Vitals and nursing note reviewed.   Constitutional:       Appearance: She is obese.   HENT:      Head: Normocephalic and atraumatic.   Cardiovascular:      Rate and Rhythm: Normal rate and regular rhythm.   Pulmonary:      Effort: Pulmonary effort is normal.      Breath sounds: Normal breath sounds.   Abdominal:      General: There is distension.   Musculoskeletal:      Right lower leg: Edema present.      Left lower leg: Edema present.   Skin:     General: Skin is warm and dry.      Coloration: Skin is pale.   Neurological:      Mental Status: She is alert. Mental status is at baseline.   Psychiatric:      Comments: irritable         RESULTS   Results Review:    Results from last 7 days   Lab Units 21  0648 21  0558 21  0543   SODIUM mmol/L 132* 134* 132*   POTASSIUM mmol/L 4.9 4.0 4.2   CHLORIDE mmol/L 101 105 102   CO2 mmol/L 23.0 22.0 21.0*   BUN mg/dL 27* 31* 27*   CREATININE mg/dL 2.01* 2.20* 2.36*   CALCIUM mg/dL 8.8 8.4* 8.8   BILIRUBIN mg/dL <0.2 <0.2 0.2   ALK PHOS U/L 198* " 189* 200*   ALT (SGPT) U/L 12 12 12   AST (SGOT) U/L 18 17 13   GLUCOSE mg/dL 287* 287* 425*       Estimated Creatinine Clearance: 37.1 mL/min (A) (by C-G formula based on SCr of 2.01 mg/dL (H)).                Results from last 7 days   Lab Units 04/08/21  0648 04/07/21  0558 04/06/21  0543 04/05/21  0537 04/04/21  0623   WBC 10*3/mm3 13.21* 11.29* 9.58 8.05 8.17   HEMOGLOBIN g/dL 10.6* 9.8* 10.3* 9.3* 10.0*   PLATELETS 10*3/mm3 368 326 317 294 310               Imaging Results (Last 24 Hours)     Procedure Component Value Units Date/Time    IR Insert Midline Without Port Pump 5 Plus [[id number]] Resulted: 04/08/21 1436     Updated: 04/08/21 1436    Narrative:      This procedure was auto-finalized with no dictation required.    US Guided Vascular Access [[id number]] Resulted: 04/08/21 1430     Updated: 04/08/21 1430    XR Chest PA & Lateral [[id number]] Collected: 04/08/21 1008     Updated: 04/08/21 1026    Narrative:      EXAM DESCRIPTION:     XR CHEST PA AND LATERAL    CLINICAL HISTORY:     61 years  Female  altered mental status increased WBC concern for  retrocardiac issue, R73.9 Hyperglycemia, unspecified N39.0  Urinary tract infection, site not specified Z74.09 Other reduced  mobility Z78.9 Other specified health status Z74.09 Other reduced  mobility    COMPARISON:     April 8, 2021    TECHNIQUE:     Two views-PA and lateral radiographs of the chest    FINDINGS:     There is evidence of prior median sternotomy. The cardiac  silhouette is at the upper limits of normal in size. There is no  evidence of acute congestive heart failure. No focal infiltrates  are seen. The overall appearance of the chest is stable.      Impression:          1. No radiographic evidence of acute cardiopulmonary disease.        Electronically signed by:  Hilda Orellana MD  4/8/2021 10:25 AM  CDT Workstation: [id number]    XR Elbow 3+ View Right [[id number]] Collected: 04/08/21 0851     Updated: 04/08/21 0943    Narrative:      PROCEDURE:  Right elbow x-ray with three views.    INDICATION: pain, R73.9 Hyperglycemia, unspecified N39.0 Urinary  tract infection, site not specified Z74.09 Other reduced mobility  Z78.9 Other specified health status Z74.09 Other reduced mobility    COMPARISON: None.    FINDINGS:  There is joint effusion with displacement and bowing of the  anterior and posterior fat pads.    There are no fractures or acute osseous abnormalities in the  elbow. No osseous arthritic changes.      Impression:      Joint effusion with no definite evidence of fracture or acute  osseous change. With presence of the joint effusion possibility  of occult fracture to be considered especially if there is been  history of trauma..    [id number]    Electronically signed by:  Nirmal Verma MD  4/8/2021 9:41 AM  CDT Workstation: [id number]    XR Chest 1 View [[id number]] Collected: 04/08/21 0613     Updated: 04/08/21 0643    Narrative:      EXAM: XR CHEST 1 VIEW    HISTORY: pneumonia, R73.9 Hyperglycemia, unspecified N39.0  Urinary tract infection, site not specified Z74.09 Other reduced  mobility Z78.9 Other specified health status Z74.09 Other reduced  mobility    COMPARISON: 4/5/2021    TECHNIQUE:   Portable view of the chest.    FINDINGS:   Mild pulmonary vascular congestion with improvement since prior.  Stable cardiomegaly. Multiple sternal sutures in the midline.  The mediastinal contours are within normal limits. .  No evidence of mediastinal shift or pneumothorax.  Unremarkable visualized osseous structures.      Impression:      1.  Mild pulmonary vascular congestion with improvement since  prior.  2.  Stable cardiomegaly.          Electronically signed by:  Richmond Bernal MD  4/8/2021 6:42 AM CDT  Workstation: [id number]           MEDICATIONS    atorvastatin, 40 mg, Oral, Daily  clindamycin, 600 mg, Oral, Q8H  clopidogrel, 75 mg, Oral, Daily  gabapentin, 400 mg, Oral, TID  heparin (porcine), 5,000 Units, Subcutaneous, Q8H  insulin aspart, 0-24 Units,  Subcutaneous, TID AC  insulin aspart, 20 Units, Subcutaneous, TID With Meals  insulin detemir, 35 Units, Subcutaneous, Q12H  ipratropium-albuterol, 3 mL, Nebulization, 4x Daily - RT  isosorbide mononitrate, 30 mg, Oral, Daily  metoprolol tartrate, 100 mg, Oral, Q12H  montelukast, 10 mg, Oral, Nightly  nystatin, , Topical, TID  oxybutynin XL, 5 mg, Oral, Daily  pantoprazole, 40 mg, Oral, Nightly  potassium chloride, 10 mEq, Oral, Daily  sodium bicarbonate, 1,300 mg, Oral, BID  sodium chloride, 10 mL, Intravenous, Q12H  sodium chloride, 10 mL, Intravenous, Q12H  sodium chloride, 10 mL, Intravenous, Q12H      sodium chloride, 125 mL/hr        Assessment/Plan   ASSESSMENT / PLAN      Type 2 diabetes mellitus with hyperosmolar hyperglycemic state (HHS) (CMS/[facility])    Chronic midline low back pain without sciatica    GERD (gastroesophageal reflux disease)    Coronary artery disease    Chronic obstructive lung disease (CMS/[facility])    Acute renal failure superimposed on chronic kidney disease (CMS/[facility])    Current smoker    Hyponatremia    Anemia    Cutaneous candidiasis    Community acquired pneumonia of right middle lobe of lung    MRSA infection    1. Acute Kidney Failure on CKD 4: Baseline creatinine 1.8-2.0 mg/dl  - Developed ERIKA in the setting of DKA.   - UA showed 2+ protein, 1+ blood, 21-30 RBCs, numerous WBCs. Hansels stain is negative.  Urine sodium 115, creatinine 14.9, protein 81.8  - Renal US no hydro  - Creatinine is 2.0 mg/dl, near BL, stop IVF, continue PO bicarb, no current acidosis or anion gap  - Maintain hemodynamics. Monitor I&Os. Avoid nephrotoxins like NSAIDs and IV contrast     2. Hypertension:   - Blood pressure is controlled     3. Anemia:  - Hemoglobin is stable. Finished IV iron     4. DKA: resolved     5. Diabetes type 2     6. Diastolic heart failure- CXR shows no fluid overload 4/8/21     7. Sleep apnea     8. Obesity     9. Metabolic Acidosis- on sodium bicarb 1300mg po bid            This document  has been electronically signed by BRIDGETT Hadley on April 8, 2021 14:46 CDT      For this patient encounter, I have reviewed the Nurse Practitioner's documentation, medical decision making, and treatment plan and personally spent time with the patient.

## 2021-04-09 LAB
ALBUMIN SERPL-MCNC: 2.8 G/DL (ref 3.5–5.2)
ALBUMIN/GLOB SERPL: 0.6 G/DL
ALP SERPL-CCNC: 184 U/L (ref 39–117)
ALT SERPL W P-5'-P-CCNC: 12 U/L (ref 1–33)
ANION GAP SERPL CALCULATED.3IONS-SCNC: 8 MMOL/L (ref 5–15)
AST SERPL-CCNC: 18 U/L (ref 1–32)
BASOPHILS # BLD AUTO: 0.09 10*3/MM3 (ref 0–0.2)
BASOPHILS NFR BLD AUTO: 0.7 % (ref 0–1.5)
BILIRUB SERPL-MCNC: 0.2 MG/DL (ref 0–1.2)
BUN SERPL-MCNC: 26 MG/DL (ref 8–23)
BUN/CREAT SERPL: 14 (ref 7–25)
CALCIUM SPEC-SCNC: 8.9 MG/DL (ref 8.6–10.5)
CHLORIDE SERPL-SCNC: 105 MMOL/L (ref 98–107)
CO2 SERPL-SCNC: 22 MMOL/L (ref 22–29)
CREAT SERPL-MCNC: 1.86 MG/DL (ref 0.57–1)
DEPRECATED RDW RBC AUTO: 49.5 FL (ref 37–54)
EOSINOPHIL # BLD AUTO: 0.3 10*3/MM3 (ref 0–0.4)
EOSINOPHIL NFR BLD AUTO: 2.4 % (ref 0.3–6.2)
ERYTHROCYTE [DISTWIDTH] IN BLOOD BY AUTOMATED COUNT: 16 % (ref 12.3–15.4)
GFR SERPL CREATININE-BSD FRML MDRD: 27 ML/MIN/1.73
GLOBULIN UR ELPH-MCNC: 4.7 GM/DL
GLUCOSE BLDC GLUCOMTR-MCNC: 114 MG/DL (ref 70–130)
GLUCOSE BLDC GLUCOMTR-MCNC: 180 MG/DL (ref 70–130)
GLUCOSE BLDC GLUCOMTR-MCNC: 251 MG/DL (ref 70–130)
GLUCOSE BLDC GLUCOMTR-MCNC: 359 MG/DL (ref 70–130)
GLUCOSE SERPL-MCNC: 125 MG/DL (ref 65–99)
HCT VFR BLD AUTO: 31.2 % (ref 34–46.6)
HGB BLD-MCNC: 10.3 G/DL (ref 12–15.9)
IMM GRANULOCYTES # BLD AUTO: 0.27 10*3/MM3 (ref 0–0.05)
IMM GRANULOCYTES NFR BLD AUTO: 2.1 % (ref 0–0.5)
LYMPHOCYTES # BLD AUTO: 3.36 10*3/MM3 (ref 0.7–3.1)
LYMPHOCYTES NFR BLD AUTO: 26.7 % (ref 19.6–45.3)
MCH RBC QN AUTO: 29.2 PG (ref 26.6–33)
MCHC RBC AUTO-ENTMCNC: 33 G/DL (ref 31.5–35.7)
MCV RBC AUTO: 88.4 FL (ref 79–97)
MONOCYTES # BLD AUTO: 0.74 10*3/MM3 (ref 0.1–0.9)
MONOCYTES NFR BLD AUTO: 5.9 % (ref 5–12)
NEUTROPHILS NFR BLD AUTO: 62.2 % (ref 42.7–76)
NEUTROPHILS NFR BLD AUTO: 7.81 10*3/MM3 (ref 1.7–7)
NRBC BLD AUTO-RTO: 0 /100 WBC (ref 0–0.2)
PLATELET # BLD AUTO: 344 10*3/MM3 (ref 140–450)
PMV BLD AUTO: 8.9 FL (ref 6–12)
POTASSIUM SERPL-SCNC: 4.6 MMOL/L (ref 3.5–5.2)
PROT SERPL-MCNC: 7.5 G/DL (ref 6–8.5)
QT INTERVAL: 428 MS
QTC INTERVAL: 441 MS
RBC # BLD AUTO: 3.53 10*6/MM3 (ref 3.77–5.28)
SODIUM SERPL-SCNC: 135 MMOL/L (ref 136–145)
TROPONIN T SERPL-MCNC: <0.01 NG/ML (ref 0–0.03)
WBC # BLD AUTO: 12.57 10*3/MM3 (ref 3.4–10.8)

## 2021-04-09 PROCEDURE — 63710000001 INSULIN DETEMIR PER 5 UNITS: Performed by: STUDENT IN AN ORGANIZED HEALTH CARE EDUCATION/TRAINING PROGRAM

## 2021-04-09 PROCEDURE — 63710000001 INSULIN ASPART PER 5 UNITS: Performed by: STUDENT IN AN ORGANIZED HEALTH CARE EDUCATION/TRAINING PROGRAM

## 2021-04-09 PROCEDURE — 99222 1ST HOSP IP/OBS MODERATE 55: CPT | Performed by: INTERNAL MEDICINE

## 2021-04-09 PROCEDURE — 99232 SBSQ HOSP IP/OBS MODERATE 35: CPT | Performed by: STUDENT IN AN ORGANIZED HEALTH CARE EDUCATION/TRAINING PROGRAM

## 2021-04-09 PROCEDURE — 80053 COMPREHEN METABOLIC PANEL: CPT | Performed by: CHIROPRACTOR

## 2021-04-09 PROCEDURE — 85025 COMPLETE CBC W/AUTO DIFF WBC: CPT | Performed by: CHIROPRACTOR

## 2021-04-09 PROCEDURE — 97110 THERAPEUTIC EXERCISES: CPT

## 2021-04-09 PROCEDURE — 25010000002 HEPARIN (PORCINE) PER 1000 UNITS: Performed by: CHIROPRACTOR

## 2021-04-09 PROCEDURE — 93005 ELECTROCARDIOGRAM TRACING: CPT | Performed by: STUDENT IN AN ORGANIZED HEALTH CARE EDUCATION/TRAINING PROGRAM

## 2021-04-09 PROCEDURE — 94799 UNLISTED PULMONARY SVC/PX: CPT

## 2021-04-09 PROCEDURE — 93010 ELECTROCARDIOGRAM REPORT: CPT | Performed by: INTERNAL MEDICINE

## 2021-04-09 PROCEDURE — 97535 SELF CARE MNGMENT TRAINING: CPT

## 2021-04-09 PROCEDURE — 82962 GLUCOSE BLOOD TEST: CPT

## 2021-04-09 PROCEDURE — 94760 N-INVAS EAR/PLS OXIMETRY 1: CPT

## 2021-04-09 PROCEDURE — 84484 ASSAY OF TROPONIN QUANT: CPT | Performed by: STUDENT IN AN ORGANIZED HEALTH CARE EDUCATION/TRAINING PROGRAM

## 2021-04-09 RX ADMIN — INSULIN ASPART 4 UNITS: 100 INJECTION, SOLUTION INTRAVENOUS; SUBCUTANEOUS at 17:59

## 2021-04-09 RX ADMIN — NYSTATIN: 100000 POWDER TOPICAL at 20:40

## 2021-04-09 RX ADMIN — CLINDAMYCIN HYDROCHLORIDE 600 MG: 150 CAPSULE ORAL at 21:30

## 2021-04-09 RX ADMIN — INSULIN ASPART 20 UNITS: 100 INJECTION, SOLUTION INTRAVENOUS; SUBCUTANEOUS at 18:02

## 2021-04-09 RX ADMIN — TRAMADOL HYDROCHLORIDE 50 MG: 50 TABLET, FILM COATED ORAL at 21:53

## 2021-04-09 RX ADMIN — HEPARIN SODIUM 5000 UNITS: 5000 INJECTION INTRAVENOUS; SUBCUTANEOUS at 14:12

## 2021-04-09 RX ADMIN — METOPROLOL TARTRATE 100 MG: 50 TABLET, FILM COATED ORAL at 20:39

## 2021-04-09 RX ADMIN — HEPARIN SODIUM 5000 UNITS: 5000 INJECTION INTRAVENOUS; SUBCUTANEOUS at 21:31

## 2021-04-09 RX ADMIN — CALCIUM CARBONATE (ANTACID) CHEW TAB 500 MG 2 TABLET: 500 CHEW TAB at 18:01

## 2021-04-09 RX ADMIN — MONTELUKAST SODIUM 10 MG: 10 TABLET, COATED ORAL at 20:39

## 2021-04-09 RX ADMIN — SODIUM BICARBONATE 1300 MG: 650 TABLET ORAL at 08:29

## 2021-04-09 RX ADMIN — METOPROLOL TARTRATE 100 MG: 50 TABLET, FILM COATED ORAL at 08:28

## 2021-04-09 RX ADMIN — INSULIN ASPART 20 UNITS: 100 INJECTION, SOLUTION INTRAVENOUS; SUBCUTANEOUS at 08:30

## 2021-04-09 RX ADMIN — CLINDAMYCIN HYDROCHLORIDE 600 MG: 150 CAPSULE ORAL at 14:12

## 2021-04-09 RX ADMIN — SODIUM CHLORIDE, PRESERVATIVE FREE 10 ML: 5 INJECTION INTRAVENOUS at 08:31

## 2021-04-09 RX ADMIN — GABAPENTIN 400 MG: 400 CAPSULE ORAL at 08:29

## 2021-04-09 RX ADMIN — INSULIN DETEMIR 35 UNITS: 100 INJECTION, SOLUTION SUBCUTANEOUS at 08:30

## 2021-04-09 RX ADMIN — INSULIN DETEMIR 35 UNITS: 100 INJECTION, SOLUTION SUBCUTANEOUS at 20:40

## 2021-04-09 RX ADMIN — TRAMADOL HYDROCHLORIDE 50 MG: 50 TABLET, FILM COATED ORAL at 04:04

## 2021-04-09 RX ADMIN — SODIUM BICARBONATE 1300 MG: 650 TABLET ORAL at 20:39

## 2021-04-09 RX ADMIN — GABAPENTIN 400 MG: 400 CAPSULE ORAL at 18:00

## 2021-04-09 RX ADMIN — NYSTATIN: 100000 POWDER TOPICAL at 17:59

## 2021-04-09 RX ADMIN — PANTOPRAZOLE SODIUM 40 MG: 40 TABLET, DELAYED RELEASE ORAL at 20:41

## 2021-04-09 RX ADMIN — INSULIN ASPART 20 UNITS: 100 INJECTION, SOLUTION INTRAVENOUS; SUBCUTANEOUS at 12:12

## 2021-04-09 RX ADMIN — SODIUM CHLORIDE, PRESERVATIVE FREE 10 ML: 5 INJECTION INTRAVENOUS at 20:42

## 2021-04-09 RX ADMIN — IPRATROPIUM BROMIDE AND ALBUTEROL SULFATE 3 ML: 2.5; .5 SOLUTION RESPIRATORY (INHALATION) at 11:01

## 2021-04-09 RX ADMIN — IPRATROPIUM BROMIDE AND ALBUTEROL SULFATE 3 ML: 2.5; .5 SOLUTION RESPIRATORY (INHALATION) at 07:26

## 2021-04-09 RX ADMIN — NYSTATIN: 100000 POWDER TOPICAL at 08:29

## 2021-04-09 RX ADMIN — ATORVASTATIN CALCIUM 40 MG: 40 TABLET, FILM COATED ORAL at 08:29

## 2021-04-09 RX ADMIN — POTASSIUM CHLORIDE 10 MEQ: 750 CAPSULE, EXTENDED RELEASE ORAL at 08:29

## 2021-04-09 RX ADMIN — ACETAMINOPHEN 650 MG: 325 TABLET, FILM COATED ORAL at 06:06

## 2021-04-09 RX ADMIN — OXYBUTYNIN CHLORIDE 5 MG: 5 TABLET, EXTENDED RELEASE ORAL at 08:29

## 2021-04-09 RX ADMIN — HEPARIN SODIUM 5000 UNITS: 5000 INJECTION INTRAVENOUS; SUBCUTANEOUS at 06:02

## 2021-04-09 RX ADMIN — IPRATROPIUM BROMIDE AND ALBUTEROL SULFATE 3 ML: 2.5; .5 SOLUTION RESPIRATORY (INHALATION) at 20:30

## 2021-04-09 RX ADMIN — CLINDAMYCIN HYDROCHLORIDE 600 MG: 150 CAPSULE ORAL at 06:02

## 2021-04-09 RX ADMIN — ISOSORBIDE MONONITRATE 30 MG: 30 TABLET, EXTENDED RELEASE ORAL at 08:29

## 2021-04-09 RX ADMIN — CLOPIDOGREL BISULFATE 75 MG: 75 TABLET ORAL at 08:29

## 2021-04-09 RX ADMIN — GABAPENTIN 400 MG: 400 CAPSULE ORAL at 20:39

## 2021-04-09 RX ADMIN — INSULIN ASPART 20 UNITS: 100 INJECTION, SOLUTION INTRAVENOUS; SUBCUTANEOUS at 12:11

## 2021-04-09 NOTE — PLAN OF CARE
Goal Outcome Evaluation:  Plan of Care Reviewed With: patient  Progress: improving  Outcome Summary: pt vss, pt a/o, ambulating to the bathroom,  at bedside, resting between care, no other complaints at this time

## 2021-04-09 NOTE — PLAN OF CARE
Problem: Adult Inpatient Plan of Care  Goal: Plan of Care Review  Outcome: Ongoing, Progressing  Flowsheets (Taken 4/9/2021 1353)  Progress: no change  Plan of Care Reviewed With: patient  Outcome Summary: patient sugar went up for lunch, but her  had brought her outside food to eat.  Goal: Patient-Specific Goal (Individualized)  Outcome: Ongoing, Progressing  Goal: Absence of Hospital-Acquired Illness or Injury  Outcome: Ongoing, Progressing  Intervention: Identify and Manage Fall Risk  Recent Flowsheet Documentation  Taken 4/9/2021 1300 by Jody Kumar, RN  Safety Promotion/Fall Prevention: safety round/check completed  Taken 4/9/2021 1111 by Jody Kumar RN  Safety Promotion/Fall Prevention: safety round/check completed  Taken 4/9/2021 0925 by Jody Kumar RN  Safety Promotion/Fall Prevention: safety round/check completed  Taken 4/9/2021 0835 by Jody Kumar RN  Safety Promotion/Fall Prevention: safety round/check completed  Taken 4/9/2021 0710 by Jody Kumar RN  Safety Promotion/Fall Prevention: safety round/check completed  Intervention: Prevent Skin Injury  Recent Flowsheet Documentation  Taken 4/9/2021 0835 by Jody Kumar, RN  Body Position: position changed independently  Goal: Optimal Comfort and Wellbeing  Outcome: Ongoing, Progressing  Intervention: Provide Person-Centered Care  Recent Flowsheet Documentation  Taken 4/9/2021 0835 by Jody Kumar, RN  Trust Relationship/Rapport: care explained  Goal: Readiness for Transition of Care  Outcome: Ongoing, Progressing   Goal Outcome Evaluation:  Plan of Care Reviewed With: patient  Progress: no change  Outcome Summary: patient sugar went up for lunch, but her  had brought her outside food to eat.

## 2021-04-09 NOTE — CONSULTS
SUBJECTIVE:   4/9/2021    Name: Yamileth Slater  DOD: 1960    REASON FOR CONSULT: Dysphagia    Chief Complaint:     Chief Complaint   Patient presents with   • Fall     pt has been having constant falls at home,  say she's had 10 falls in 10 days.    • Pain     pt has pain in lower back, back of head, neck and right elbow from recent falls       Subjective     Patient is 61 y.o. female with past medical history of diabetes mellitus, candidiasis, metabolic acidosis, hyponatremia, hypokalemia, hypocalcemia, cystitis, acute renal failure superimposed on chronic renal failure, coronary artery disease, COPD presents with acute change in mental status.  She feels better currently.  Hyponatremia is improving.  Mental status change has improved as well.  She has history of GERD for past several years and has dysphagia to solids for past few weeks.  Denied abdominal pain, nausea, vomiting, diarrhea, constipation, rectal bleeding or weight loss.  Her last EGD with dilatation was by Dr. Oshea in 2015 per patient.  She takes PPI daily for GERD with good control of symptoms.     ROS/HISTORY/ CURRENT MEDICATIONS/OBJECTIVE/VS/PE:   Review of Systems:   Review of Systems   Constitutional: Negative for chills, fatigue, fever and unexpected weight change.   HENT: Positive for trouble swallowing. Negative for congestion, ear discharge, hearing loss, nosebleeds and sore throat.    Eyes: Negative for pain, discharge and redness.   Respiratory: Negative for cough, chest tightness, shortness of breath and wheezing.    Cardiovascular: Negative for chest pain and palpitations.   Gastrointestinal: Negative for abdominal distention, abdominal pain, blood in stool, constipation, diarrhea, nausea and vomiting.   Endocrine: Negative for cold intolerance, polydipsia, polyphagia and polyuria.   Genitourinary: Negative for dysuria, flank pain, frequency, hematuria and urgency.   Musculoskeletal: Negative for arthralgias, back  pain, joint swelling and myalgias.   Skin: Negative for color change, pallor and rash.   Neurological: Negative for tremors, seizures, syncope, weakness and headaches.   Hematological: Negative for adenopathy. Does not bruise/bleed easily.   Psychiatric/Behavioral: Negative for behavioral problems, confusion, dysphoric mood, hallucinations and suicidal ideas. The patient is not nervous/anxious.        History:     Past Medical History:   Diagnosis Date   • Anxiety    • Carotid artery stenosis    • Chronic obstructive lung disease (CMS/[facility])    • CKD (chronic kidney disease) stage 4, GFR 15-29 ml/min (CMS/[facility])    • Colonic polyp    • Coronary arteriosclerosis    • Diabetes mellitus (CMS/[facility])    • Diabetic neuropathy (CMS/[facility])    • GERD (gastroesophageal reflux disease)    • Hypercholesterolemia    • Hypertension    • Morbid obesity (CMS/[facility])    • Nephrolithiasis    • Peripheral vascular disease (CMS/[facility])    • Sleep apnea    • Substance abuse (CMS/[facility])    • Vitamin D deficiency      Past Surgical History:   Procedure Laterality Date   • CARDIAC CATHETERIZATION N/A 7/14/2020   • CAROTID STENT Left    • COLONOSCOPY     • CORONARY ARTERY BYPASS GRAFT     • CYSTOSCOPY BLADDER STONE LITHOTRIPSY Bilateral      Family History   Problem Relation Age of Onset   • Heart disease Mother    • Heart disease Father    • Heart disease Sister    • Heart disease Brother      Social History     Tobacco Use   • Smoking status: Light Tobacco Smoker     Packs/day: 0.25     Years: 46.00     Pack years: 11.50     Types: Cigarettes   • Smokeless tobacco: Never Used   • Tobacco comment: only smoking 5 a day    Substance Use Topics   • Alcohol use: No   • Drug use: Not Currently     Types: LSD, Marijuana, Methamphetamines     Medications Prior to Admission   Medication Sig Dispense Refill Last Dose   • albuterol sulfate  (90 Base) MCG/ACT inhaler Inhale 2 puffs Every 4 (Four) Hours As Needed for Wheezing. 18 g 1    •  amoxicillin-clavulanate (Augmentin) 875-125 MG per tablet Take 1 tablet by mouth Daily. 10 tablet 0    • atorvastatin (LIPITOR) 40 MG tablet Take 1 tablet by mouth Daily. 90 tablet 0    • azithromycin (ZITHROMAX) 250 MG tablet       • Blood Glucose Monitoring Suppl ([employer] Blood Glucose Meter) w/Device kit 1 each 3 (Three) Times a Day. 1 kit 3    • cetirizine (zyrTEC) 10 MG tablet Take 1 tablet by mouth Daily. 30 tablet 3    • clopidogrel (PLAVIX) 75 MG tablet Take 1 tablet by mouth Daily. 30 tablet 5    • Continuous Blood Gluc  (FreeStyle Jade 2 [place]oa) device 1 each Continuous. 1 each 0    • Continuous Blood Gluc Sensor (FreeStyle Jade 2 Sensor) misc 1 each Every 14 (Fourteen) Days. 2 each 11    • cyclobenzaprine (FLEXERIL) 10 MG tablet Take 1 tablet by mouth 2 (Two) Times a Day As Needed for Muscle Spasms. 60 tablet 5    • DULoxetine (Cymbalta) 20 MG capsule Take 1 capsule by mouth Daily. 30 capsule 0    • EASY TOUCH PEN NEEDLES 31G X 8 MM misc       • Empagliflozin (Jardiance) 25 MG tablet Take 25 mg by mouth Daily. 90 tablet 5    • ferrous sulfate (FeroSul) 325 (65 FE) MG tablet Take 1 tablet by mouth Daily With Breakfast. 30 tablet 3    • gabapentin (NEURONTIN) 800 MG tablet Take 1 tablet by mouth 3 (Three) Times a Day. 90 tablet 0    • glucose blood test strip Use as instructed 100 each 12    • insulin aspart (novoLOG FLEXPEN) 100 UNIT/ML solution pen-injector sc pen Inject 5 Units under the skin into the appropriate area as directed 3 (Three) Times a Day With Meals. 1 pen 1    • Insulin Glargine (Lantus SoloStar) 100 UNIT/ML injection pen Inject 100 Units under the skin into the appropriate area as directed Every 12 (Twelve) Hours. 60 mL 11    • Insulin Pen Needle (NovoFine) 30G X 8 MM misc As directed 4 times daily 100 each 11    • isosorbide mononitrate (IMDUR) 30 MG 24 hr tablet Take 1 tablet by mouth Daily. 90 tablet 2    • lidocaine (LIDODERM) 5 % PLACE 1 PATCH ON THE SKIN AS DIRECTED BY PROVIDER  DAILY. REMOVE & DISCARD PATCH WITHIN 12 HOURS OR AS DIRECTED BY MD 30 patch 3    • lisinopril (PRINIVIL,ZESTRIL) 10 MG tablet Take 1 tablet by mouth Daily. 30 tablet 5    • methylPREDNISolone (MEDROL) 4 MG dose pack       • metoprolol tartrate (LOPRESSOR) 100 MG tablet Take 1 tablet by mouth Every 12 (Twelve) Hours for 30 days. (Patient taking differently: Take 100 mg by mouth 2 (Two) Times a Day.) 60 tablet 0    • montelukast (SINGULAIR) 10 MG tablet Take 1 tablet by mouth Every Night. 30 tablet 5    • nitroglycerin (NITROSTAT) 0.4 MG SL tablet Place 1 tablet under the tongue As Needed for Chest Pain. Take no more than 3 doses in 15 minutes. 30 tablet 3    • nystatin (MYCOSTATIN) 515770 UNIT/GM powder Apply  topically to the appropriate area as directed 3 (Three) Times a Day. 15 g 1    • ondansetron ODT (Zofran ODT) 4 MG disintegrating tablet Place 1 tablet on the tongue Every 8 (Eight) Hours As Needed for Nausea or Vomiting. 30 tablet 0    • oxybutynin XL (DITROPAN-XL) 5 MG 24 hr tablet Take 1 tablet by mouth Daily. 90 tablet 1    • pantoprazole (PROTONIX) 40 MG EC tablet Take 1 tablet by mouth Every Night. 30 tablet 5    • potassium chloride (MICRO-K) 10 MEQ CR capsule Take 1 capsule by mouth Daily. 30 capsule 5    • promethazine (PHENERGAN) 25 MG tablet Take 1 tablet by mouth Every 6 (Six) Hours As Needed for Nausea or Vomiting. 30 tablet 0    • sodium bicarbonate 325 MG tablet Take 1 tablet by mouth 2 (Two) Times a Day. 60 tablet 5      Allergies:  Adhesive tape and Other    I have reviewed the patient's medical history, surgical history and family history in the available medical record system.     Current Medications:     Current Facility-Administered Medications   Medication Dose Route Frequency Provider Last Rate Last Admin   • acetaminophen (TYLENOL) tablet 650 mg  650 mg Oral Q6H PRN Huy Santa MD   650 mg at 04/09/21 0606   • albuterol (PROVENTIL) nebulizer solution 0.083% 2.5 mg/3mL  2.5 mg  Nebulization Q4H PRN Huy Santa MD       • atorvastatin (LIPITOR) tablet 40 mg  40 mg Oral Daily Huy Santa MD   40 mg at 04/09/21 0829   • calcium carbonate (TUMS) chewable tablet 500 mg (200 mg elemental)  2 tablet Oral BID PRN Nirmal Calvillo MD   2 tablet at 04/08/21 2243   • clindamycin (CLEOCIN) capsule 600 mg  600 mg Oral Q8H Nirmal Calvillo MD   600 mg at 04/09/21 1412   • clopidogrel (PLAVIX) tablet 75 mg  75 mg Oral Daily Huy Santa MD   75 mg at 04/09/21 0829   • cyclobenzaprine (FLEXERIL) tablet 10 mg  10 mg Oral BID PRN Huy Santa MD   10 mg at 04/05/21 0003   • dextrose (D50W) 25 g/ 50mL Intravenous Solution 25 g  25 g Intravenous Q15 Min PRN Huy Santa MD       • dextrose (GLUTOSE) oral gel 15 g  15 g Oral Q15 Min PRN Huy Santa MD       • gabapentin (NEURONTIN) capsule 400 mg  400 mg Oral TID Umesh Giraldo III, MD   400 mg at 04/09/21 0829   • glucagon (human recombinant) (GLUCAGEN DIAGNOSTIC) injection 1 mg  1 mg Subcutaneous Q15 Min PRN Huy Santa MD       • heparin (porcine) 5000 UNIT/ML injection 5,000 Units  5,000 Units Subcutaneous Q8H Huy Santa MD   5,000 Units at 04/09/21 1412   • insulin aspart (novoLOG) injection 0-24 Units  0-24 Units Subcutaneous TID AC Haily Mcneil MD   20 Units at 04/09/21 1211   • insulin aspart (novoLOG) injection 20 Units  20 Units Subcutaneous TID With Meals Nirmal Calvillo MD   20 Units at 04/09/21 1212   • insulin detemir (LEVEMIR) injection 35 Units  35 Units Subcutaneous Q12H Nirmal Calvillo MD   35 Units at 04/09/21 0830   • ipratropium-albuterol (DUO-NEB) nebulizer solution 3 mL  3 mL Nebulization 4x Daily - RT Huy Santa MD   3 mL at 04/09/21 1101   • isosorbide mononitrate (IMDUR) 24 hr tablet 30 mg  30 mg Oral Daily Huy Santa MD   30 mg at 04/09/21 0829   • metoprolol tartrate (LOPRESSOR) tablet 100 mg  100 mg Oral Q12H Huy Santa MD   100 mg at 04/09/21 0828   • montelukast  (SINGULAIR) tablet 10 mg  10 mg Oral Nightly Huy Santa MD   10 mg at 04/08/21 2157   • nitroglycerin (NITROSTAT) SL tablet 0.4 mg  0.4 mg Sublingual PRN Huy Santa MD       • nystatin (MYCOSTATIN) powder   Topical TID Huy Santa MD   Given at 04/09/21 0829   • ondansetron ODT (ZOFRAN-ODT) disintegrating tablet 4 mg  4 mg Oral Q8H PRN Huy Santa MD   4 mg at 04/08/21 2243   • oxybutynin XL (DITROPAN-XL) 24 hr tablet 5 mg  5 mg Oral Daily Huy Santa MD   5 mg at 04/09/21 0829   • pantoprazole (PROTONIX) EC tablet 40 mg  40 mg Oral Nightly Huy Santa MD   40 mg at 04/08/21 2158   • potassium chloride (MICRO-K) CR capsule 10 mEq  10 mEq Oral Daily Huy Santa MD   10 mEq at 04/09/21 0829   • promethazine (PHENERGAN) tablet 25 mg  25 mg Oral Q6H PRN Huy Santa MD       • sodium bicarbonate tablet 1,300 mg  1,300 mg Oral BID Janice Chavez APRN   1,300 mg at 04/09/21 0829   • sodium chloride 0.9 % flush 10 mL  10 mL Intravenous PRN Huy Santa MD       • sodium chloride 0.9 % flush 10 mL  10 mL Intravenous PRN Huy Santa MD       • sodium chloride 0.9 % flush 10 mL  10 mL Intravenous Q12H Huy Santa MD   10 mL at 04/05/21 0937   • sodium chloride 0.9 % flush 10 mL  10 mL Intravenous PRN Huy Santa MD       • sodium chloride 0.9 % flush 10 mL  10 mL Intravenous Q12H Huy Santa MD   10 mL at 04/09/21 0831   • sodium chloride 0.9 % flush 10 mL  10 mL Intravenous Q12H Huy Santa MD   10 mL at 04/09/21 0831   • sodium chloride 0.9 % flush 10 mL  10 mL Intravenous PRN Huy Santa MD       • traMADol (ULTRAM) tablet 50 mg  50 mg Oral Q8H PRN Nirmal Calvillo MD   50 mg at 04/09/21 0404       Objective     Physical Exam:   Temp:  [96.7 °F (35.9 °C)-97.4 °F (36.3 °C)] 97.4 °F (36.3 °C)  Heart Rate:  [63-78] 71  Resp:  [16-18] 18  BP: (120-152)/(60-76) 120/62    Physical Exam:  General Appearance:    Alert, cooperative, in no acute distress   Head:     Normocephalic, without obvious abnormality, atraumatic   Eyes:            Lids and lashes normal, conjunctivae and sclerae normal, no   icterus, no pallor, corneas clear, PERRLA   Ears:    Ears appear intact with no abnormalities noted   Throat:   No oral lesions, no thrush, oral mucosa moist   Neck:   No adenopathy, supple, trachea midline, no thyromegaly, no     carotid bruit, no JVD   Back:     No kyphosis present, no scoliosis present, no skin lesions,       erythema or scars, no tenderness to percussion or                   palpation,   range of motion normal   Lungs:     Clear to auscultation,respirations regular, even and                   unlabored    Heart:    Regular rhythm and normal rate, normal S1 and S2, no            murmur, no gallop, no rub, no click   Breast Exam:    Deferred   Abdomen:     Normal bowel sounds, no masses, no organomegaly, soft        nontender, nondistended, no guarding, no rebound                 tenderness   Genitalia:    Deferred   Extremities:   Moves all extremities well, no edema, no cyanosis, no              redness   Pulses:   Pulses palpable and equal bilaterally   Skin:   No bleeding, bruising or rash   Lymph nodes:   No palpable adenopathy   Neurologic:   Cranial nerves 2 - 12 grossly intact, sensation intact, DTR        present and equal bilaterally      Results Review:     Lab Results   Component Value Date    WBC 12.57 (H) 04/09/2021    WBC 13.21 (H) 04/08/2021    WBC 11.29 (H) 04/07/2021    HGB 10.3 (L) 04/09/2021    HGB 10.6 (L) 04/08/2021    HGB 9.8 (L) 04/07/2021    HCT 31.2 (L) 04/09/2021    HCT 33.4 (L) 04/08/2021    HCT 31.0 (L) 04/07/2021     04/09/2021     04/08/2021     04/07/2021     Results from last 7 days   Lab Units 04/09/21  0656 04/08/21  0648 04/07/21  0558   ALK PHOS U/L 184* 198* 189*   ALT (SGPT) U/L 12 12 12   AST (SGOT) U/L 18 18 17     Results from last 7 days   Lab Units 04/09/21  0656 04/08/21  0648 04/07/21  0558    BILIRUBIN mg/dL 0.2 <0.2 <0.2   ALK PHOS U/L 184* 198* 189*     No results found for: LIPASE  Lab Results   Component Value Date    INR 1.01 03/31/2021    INR 1.03 01/13/2021    INR 1.05 07/14/2020         Radiology Review:  Imaging Results (Last 72 Hours)     Procedure Component Value Units Date/Time    US Guided Vascular Access [[id number]] Collected: 04/08/21 1420     Updated: 04/08/21 1822    Narrative:      PROCEDURE: Ultrasound Guidance Vascular Access      Ordering physician(s): FREDDY MADERA    Clinical Indication: Ultrasound-guided venous access. Left  cephalic vein.      Findings:    The vessel was sonographically evaluated and determined to be  patent. Concurrent realtime ultrasound was used to visualize  needle entry into the left cephalic     vein    and a permanent  image was obtained    for permanent recording and reporting.         Impression:      Impression:     Uneventful ultrasound-guided venous access left cephalic    vein  for midline catheter.       Electronically signed by:  Hugo Russo MD  4/8/2021 6:21 PM CDT  Workstation: [id number]    IR Insert Midline Without Port Pump 5 Plus [[id number]] Resulted: 04/08/21 1436     Updated: 04/08/21 1436    Narrative:      This procedure was auto-finalized with no dictation required.    XR Chest PA & Lateral [[id number]] Collected: 04/08/21 1008     Updated: 04/08/21 1026    Narrative:      EXAM DESCRIPTION:     XR CHEST PA AND LATERAL    CLINICAL HISTORY:     61 years  Female  altered mental status increased WBC concern for  retrocardiac issue, R73.9 Hyperglycemia, unspecified N39.0  Urinary tract infection, site not specified Z74.09 Other reduced  mobility Z78.9 Other specified health status Z74.09 Other reduced  mobility    COMPARISON:     April 8, 2021    TECHNIQUE:     Two views-PA and lateral radiographs of the chest    FINDINGS:     There is evidence of prior median sternotomy. The cardiac  silhouette is at the upper limits of normal in size.  There is no  evidence of acute congestive heart failure. No focal infiltrates  are seen. The overall appearance of the chest is stable.      Impression:          1. No radiographic evidence of acute cardiopulmonary disease.        Electronically signed by:  Hilda Orellana MD  4/8/2021 10:25 AM  CDT Workstation: [id number]    XR Elbow 3+ View Right [[id number]] Collected: 04/08/21 0851     Updated: 04/08/21 0943    Narrative:      PROCEDURE: Right elbow x-ray with three views.    INDICATION: pain, R73.9 Hyperglycemia, unspecified N39.0 Urinary  tract infection, site not specified Z74.09 Other reduced mobility  Z78.9 Other specified health status Z74.09 Other reduced mobility    COMPARISON: None.    FINDINGS:  There is joint effusion with displacement and bowing of the  anterior and posterior fat pads.    There are no fractures or acute osseous abnormalities in the  elbow. No osseous arthritic changes.      Impression:      Joint effusion with no definite evidence of fracture or acute  osseous change. With presence of the joint effusion possibility  of occult fracture to be considered especially if there is been  history of trauma..    [id number]    Electronically signed by:  Nirmal Verma MD  4/8/2021 9:41 AM  CDT Workstation: [id number]    XR Chest 1 View [[id number]] Collected: 04/08/21 0613     Updated: 04/08/21 0643    Narrative:      EXAM: XR CHEST 1 VIEW    HISTORY: pneumonia, R73.9 Hyperglycemia, unspecified N39.0  Urinary tract infection, site not specified Z74.09 Other reduced  mobility Z78.9 Other specified health status Z74.09 Other reduced  mobility    COMPARISON: 4/5/2021    TECHNIQUE:   Portable view of the chest.    FINDINGS:   Mild pulmonary vascular congestion with improvement since prior.  Stable cardiomegaly. Multiple sternal sutures in the midline.  The mediastinal contours are within normal limits. .  No evidence of mediastinal shift or pneumothorax.  Unremarkable visualized osseous structures.       Impression:      1.  Mild pulmonary vascular congestion with improvement since  prior.  2.  Stable cardiomegaly.          Electronically signed by:  Richmond Bernal MD  4/8/2021 6:42 AM CDT  Workstation: [id number]          I reviewed the patient's new clinical results.    I reviewed the patient's new imaging results and agree with the interpretation.     ASSESSMENT/PLAN:   ASSESSMENT: 1.  Dysphagia, rule out stricture, ring and CA.  2.  GERD, well controlled with PPI.  3.  Acute renal insufficiency, improving.  4.  Change in mental status, improving.  5.  Hyponatremia, improving.    PLAN: 1.  Continue PPI  2.  Soft diet  3.  EGD on Monday for possible dilatation.  4.  Continue p.o. fluid restriction  5.  Continue current supportive care  The risks, benefits, and alternatives of this procedure have been discussed with the patient or the responsible party. The patient understands and agrees to proceed.         Mingo Duarte MD  04/09/21  15:31 CDT

## 2021-04-09 NOTE — PROGRESS NOTES
FAMILY MEDICINE DAILY PROGRESS NOTE  NAME: Yamileth Slater  : 1960  MRN: [id number]     LOS: 8 days     PROVIDER OF SERVICE: Nirmal Calvillo MD    Chief Complaint: Type 2 diabetes mellitus with hyperosmolar hyperglycemic state (HHS) (CMS/[facility])    Subjective:     Interval History:  History taken from: patient chart  VSS, glucose labile with no outside meals per patient and family. WBC count improving, Na decreasing, Cr improving. Complaining of food getting stuck in throat.    Review of Systems:   Review of Systems   Constitutional: Negative for activity change and appetite change.   HENT: Negative for congestion and trouble swallowing.    Respiratory: Negative for chest tightness and shortness of breath.    Cardiovascular: Positive for chest pain (With food getting stuck). Negative for palpitations.   Gastrointestinal: Negative for abdominal distention and abdominal pain.   Genitourinary: Negative for difficulty urinating and dysuria.   Musculoskeletal: Positive for back pain (Sacrum). Negative for arthralgias and myalgias.   Skin: Negative for color change and pallor.   Neurological: Negative for dizziness, light-headedness and headaches.   Psychiatric/Behavioral: Negative for agitation and behavioral problems.       Objective:     Vital Signs  Temp:  [96.6 °F (35.9 °C)-97.2 °F (36.2 °C)] 97.1 °F (36.2 °C)  Heart Rate:  [64-79] 76  Resp:  [16-18] 18  BP: (100-159)/(60-78) 134/60    Physical Exam  Physical Exam  Constitutional:       General: She is not in acute distress.     Appearance: She is well-developed.   HENT:      Head: Normocephalic and atraumatic.      Right Ear: External ear normal.      Left Ear: External ear normal.      Nose: Nose normal.   Eyes:      Extraocular Movements: Extraocular movements intact.      Pupils: Pupils are equal, round, and reactive to light.   Cardiovascular:      Rate and Rhythm: Normal rate and regular rhythm.      Heart sounds: Normal heart sounds. No murmur  heard.   No friction rub. No gallop.    Pulmonary:      Effort: Pulmonary effort is normal. No respiratory distress.      Breath sounds: Normal breath sounds. No wheezing or rales.   Abdominal:      General: Bowel sounds are normal. There is no distension.      Palpations: Abdomen is soft.      Tenderness: There is no abdominal tenderness.   Musculoskeletal:         General: Normal range of motion.      Cervical back: Normal range of motion and neck supple.      Right lower leg: Edema present.      Left lower leg: Edema present.   Skin:     General: Skin is warm.      Findings: No erythema.   Neurological:      Mental Status: She is alert and oriented to person, place, and time. Mental status is at baseline.   Psychiatric:         Mood and Affect: Mood normal.         Behavior: Behavior normal.         Medication Review    Current Facility-Administered Medications:   •  acetaminophen (TYLENOL) tablet 650 mg, 650 mg, Oral, Q6H PRN, Huy Santa MD, 650 mg at 04/09/21 0606  •  albuterol (PROVENTIL) nebulizer solution 0.083% 2.5 mg/3mL, 2.5 mg, Nebulization, Q4H PRN, Huy Santa MD  •  atorvastatin (LIPITOR) tablet 40 mg, 40 mg, Oral, Daily, Huy Santa MD, 40 mg at 04/08/21 0942  •  calcium carbonate (TUMS) chewable tablet 500 mg (200 mg elemental), 2 tablet, Oral, BID PRN, Nirmal Calvillo MD, 2 tablet at 04/08/21 2243  •  clindamycin (CLEOCIN) capsule 600 mg, 600 mg, Oral, Q8H, Nirmal Calvillo MD, 600 mg at 04/09/21 0602  •  clopidogrel (PLAVIX) tablet 75 mg, 75 mg, Oral, Daily, Huy Santa MD, 75 mg at 04/08/21 0942  •  cyclobenzaprine (FLEXERIL) tablet 10 mg, 10 mg, Oral, BID PRN, Huy Santa MD, 10 mg at 04/05/21 0003  •  dextrose (D50W) 25 g/ 50mL Intravenous Solution 25 g, 25 g, Intravenous, Q15 Min PRN, Huy Santa MD  •  dextrose (GLUTOSE) oral gel 15 g, 15 g, Oral, Q15 Min PRN, Huy Santa MD  •  gabapentin (NEURONTIN) capsule 400 mg, 400 mg, Oral, TID, Umesh Giraldo  III, MD, 400 mg at 04/08/21 2159  •  glucagon (human recombinant) (GLUCAGEN DIAGNOSTIC) injection 1 mg, 1 mg, Subcutaneous, Q15 Min PRN, Huy Santa MD  •  heparin (porcine) 5000 UNIT/ML injection 5,000 Units, 5,000 Units, Subcutaneous, Q8H, Huy Santa MD, 5,000 Units at 04/09/21 0602  •  insulin aspart (novoLOG) injection 0-24 Units, 0-24 Units, Subcutaneous, TID AC, Haily Mcneil MD, 8 Units at 04/08/21 1721  •  insulin aspart (novoLOG) injection 20 Units, 20 Units, Subcutaneous, TID With Meals, Nirmal Calvillo MD, 20 Units at 04/08/21 1721  •  insulin detemir (LEVEMIR) injection 35 Units, 35 Units, Subcutaneous, Q12H, Nirmal Calvillo MD, 35 Units at 04/08/21 2203  •  ipratropium-albuterol (DUO-NEB) nebulizer solution 3 mL, 3 mL, Nebulization, 4x Daily - RT, Huy Santa MD, 3 mL at 04/09/21 0726  •  isosorbide mononitrate (IMDUR) 24 hr tablet 30 mg, 30 mg, Oral, Daily, Huy Santa MD, 30 mg at 04/08/21 0942  •  metoprolol tartrate (LOPRESSOR) tablet 100 mg, 100 mg, Oral, Q12H, Huy Santa MD, 100 mg at 04/08/21 2157  •  montelukast (SINGULAIR) tablet 10 mg, 10 mg, Oral, Nightly, Huy Santa MD, 10 mg at 04/08/21 2157  •  nitroglycerin (NITROSTAT) SL tablet 0.4 mg, 0.4 mg, Sublingual, PRN, Huy Santa MD  •  nystatin (MYCOSTATIN) powder, , Topical, TID, Huy Santa MD, Given at 04/08/21 2158  •  ondansetron ODT (ZOFRAN-ODT) disintegrating tablet 4 mg, 4 mg, Oral, Q8H PRN, Huy Santa MD, 4 mg at 04/08/21 2243  •  oxybutynin XL (DITROPAN-XL) 24 hr tablet 5 mg, 5 mg, Oral, Daily, Huy Santa MD, 5 mg at 04/08/21 0942  •  pantoprazole (PROTONIX) EC tablet 40 mg, 40 mg, Oral, Nightly, Huy Santa MD, 40 mg at 04/08/21 2158  •  potassium chloride (MICRO-K) CR capsule 10 mEq, 10 mEq, Oral, Daily, Huy Santa MD, 10 mEq at 04/08/21 0942  •  promethazine (PHENERGAN) tablet 25 mg, 25 mg, Oral, Q6H PRN, Huy Santa MD  •  sodium bicarbonate tablet 1,300 mg,  1,300 mg, Oral, BID, Janice Chavez, APRN, 1,300 mg at 04/08/21 2158  •  sodium chloride 0.9 % flush 10 mL, 10 mL, Intravenous, PRNKiet James, MD  •  [COMPLETED] Insert peripheral IV, , , Once **AND** sodium chloride 0.9 % flush 10 mL, 10 mL, Intravenous, PRN, Huy Santa MD  •  sodium chloride 0.9 % flush 10 mL, 10 mL, Intravenous, Q12H, Huy Santa MD, 10 mL at 04/05/21 0937  •  sodium chloride 0.9 % flush 10 mL, 10 mL, Intravenous, PRN, Huy Santa MD  •  sodium chloride 0.9 % flush 10 mL, 10 mL, Intravenous, Q12H, Huy Santa MD, 10 mL at 04/08/21 2159  •  sodium chloride 0.9 % flush 10 mL, 10 mL, Intravenous, Q12H, Huy Santa MD, 10 mL at 04/08/21 2157  •  sodium chloride 0.9 % flush 10 mL, 10 mL, Intravenous, PRN, Huy Santa MD  •  traMADol (ULTRAM) tablet 50 mg, 50 mg, Oral, Q8H PRN, Nirmal Calvillo MD, 50 mg at 04/09/21 0404     Diagnostic Data    Lab Results (last 24 hours)     Procedure Component Value Units Date/Time    Comprehensive Metabolic Panel [[id number]]  (Abnormal) Collected: 04/09/21 0656    Specimen: Blood Updated: 04/09/21 0728     Glucose 125 mg/dL      BUN 26 mg/dL      Creatinine 1.86 mg/dL      Sodium 135 mmol/L      Potassium 4.6 mmol/L      Chloride 105 mmol/L      CO2 22.0 mmol/L      Calcium 8.9 mg/dL      Total Protein 7.5 g/dL      Albumin 2.80 g/dL      ALT (SGPT) 12 U/L      AST (SGOT) 18 U/L      Alkaline Phosphatase 184 U/L      Total Bilirubin 0.2 mg/dL      eGFR Non African Amer 27 mL/min/1.73      Globulin 4.7 gm/dL      A/G Ratio 0.6 g/dL      BUN/Creatinine Ratio 14.0     Anion Gap 8.0 mmol/L     Narrative:      GFR Normal >60  Chronic Kidney Disease <60  Kidney Failure <15      CBC & Differential [[id number]]  (Abnormal) Collected: 04/09/21 0656    Specimen: Blood Updated: 04/09/21 0707    Narrative:      The following orders were created for panel order CBC & Differential.  Procedure                               Abnormality         Status                      ---------                               -----------         ------                     CBC Auto Differential[[id number]]        Abnormal            Final result                 Please view results for these tests on the individual orders.    CBC Auto Differential [[id number]]  (Abnormal) Collected: 04/09/21 0656    Specimen: Blood Updated: 04/09/21 0707     WBC 12.57 10*3/mm3      RBC 3.53 10*6/mm3      Hemoglobin 10.3 g/dL      Hematocrit 31.2 %      MCV 88.4 fL      MCH 29.2 pg      MCHC 33.0 g/dL      RDW 16.0 %      RDW-SD 49.5 fl      MPV 8.9 fL      Platelets 344 10*3/mm3      Neutrophil % 62.2 %      Lymphocyte % 26.7 %      Monocyte % 5.9 %      Eosinophil % 2.4 %      Basophil % 0.7 %      Immature Grans % 2.1 %      Neutrophils, Absolute 7.81 10*3/mm3      Lymphocytes, Absolute 3.36 10*3/mm3      Monocytes, Absolute 0.74 10*3/mm3      Eosinophils, Absolute 0.30 10*3/mm3      Basophils, Absolute 0.09 10*3/mm3      Immature Grans, Absolute 0.27 10*3/mm3      nRBC 0.0 /100 WBC     POC Glucose Once [[id number]]  (Normal) Collected: 04/09/21 0603    Specimen: Blood Updated: 04/09/21 0635     Glucose 114 mg/dL      Comment: RN NotifiedOperator: [id number] PATRICIA POLLOCKMeter ID: [id number]       POC Glucose Once [[id number]]  (Abnormal) Collected: 04/07/21 2021    Specimen: Blood Updated: 04/08/21 2242     Glucose 290 mg/dL      Comment: RN NotifiedOperator: [id number] PATRICIA LEHMANaroundtheway ID: [id number]       POC Glucose Once [[id number]]  (Abnormal) Collected: 04/08/21 1946    Specimen: Blood Updated: 04/08/21 2008     Glucose 205 mg/dL      Comment: RN NotifiedOperator: [id number] PATRICIA POLLOCKMeter ID: [id number]       POC Glucose Once [[id number]]  (Abnormal) Collected: 04/08/21 1720    Specimen: Blood Updated: 04/08/21 1734     Glucose 216 mg/dL      Comment: RN NotifiedOperator: [id number] VIED LACEYMeter ID: [id number]       POC Glucose Once [[id number]]  (Abnormal)  Collected: 04/08/21 1518    Specimen: Blood Updated: 04/08/21 1534     Glucose 264 mg/dL      Comment: RN NotifiedOperator: [id number] JUAQUIN Calderaer ID: [id number]              Imaging Results (Last 24 Hours)     Procedure Component Value Units Date/Time    US Guided Vascular Access [[id number]] Collected: 04/08/21 1420     Updated: 04/08/21 1822    Narrative:      PROCEDURE: Ultrasound Guidance Vascular Access      Ordering physician(s): FREDDY MADERA    Clinical Indication: Ultrasound-guided venous access. Left  cephalic vein.      Findings:    The vessel was sonographically evaluated and determined to be  patent. Concurrent realtime ultrasound was used to visualize  needle entry into the left cephalic     vein    and a permanent  image was obtained    for permanent recording and reporting.         Impression:      Impression:     Uneventful ultrasound-guided venous access left cephalic    vein  for midline catheter.       Electronically signed by:  Hugo Russo MD  4/8/2021 6:21 PM CDT  Workstation: [id number]    IR Insert Midline Without Port Pump 5 Plus [[id number]] Resulted: 04/08/21 1436     Updated: 04/08/21 1436    Narrative:      This procedure was auto-finalized with no dictation required.          I reviewed the patient's new clinical results.    Assessment/Plan:     Active Hospital Problems    Diagnosis    • **Type 2 diabetes mellitus with hyperosmolar hyperglycemic state (HHS) (CMS/[facility])      -Glucose monitoring  -Levemir 35u bid, Aspart 20 units w/meals, ISS   +32 units SSI in last 24 hours  -Consistent carb diet     • MRSA infection    • Cutaneous candidiasis      - Nystatin     • Community acquired pneumonia of right middle lobe of lung      - Confirmed on CXR. CXR 4/5 unremarkable   -Aspiration pneumonitis now suspected.  Patient MRSA positive in nares.  -IV clindamycin 300 mg 3 times daily finish out 10-day course of antibiotic treatment. Started 4/3/21. Switched to Clindamycin 600mg q8h  4/5/21     • Hyponatremia      -Normal once corrected for hyperglycemia     • Anemia      -Iron studies indicative of mixed iron deficiency and chronic disease  -Continue home ferrous sulfate       • Acute renal failure superimposed on chronic kidney disease (CMS/HCC)      -Hold nephrotoxic drugs  -Nephrology consulted and appreciate recommendations  -Improving  -Baseline: 1.8-2     • Chronic midline low back pain without sciatica      - Tramadol PRN     • Chronic obstructive lung disease (CMS/HCC)      -O2 supplementation  -Home inhalers as needed  -DuoNebs  -CPAP at night     • GERD (gastroesophageal reflux disease)      - IV protonix qday  - Tums     • Coronary artery disease      -Continue home meds     • Current smoker      -Patch           DVT prophylaxis: Heparin  Code Status and Medical Interventions:   Ordered at: 03/31/21 1811     Code Status:    CPR     Medical Interventions (Level of Support Prior to Arrest):    Full       Plan for disposition:Where: rehab and current living arrangements and When:  1-2 days      Time: 15 min           This document has been electronically signed by Nirmal Calvillo MD on April 9, 2021 07:37 CDT

## 2021-04-09 NOTE — PROGRESS NOTES
"[facility]  [address] [place] [address]. [id number]   - [phone]  F - [phone]     Progress Note          PATIENT  DEMOGRAPHICS   PATIENT NAME: Yamileth Slater                      PHYSICIAN: Ace Lauren MD  : 1960  MRN: [id number]   LOS: 8 days    Patient Care Team:  Nirmal Calvillo MD as PCP - General (Family Medicine)  Subjective   SUBJECTIVE   Feels better today       Objective   OBJECTIVE   Vital Signs  Temp:  [96.6 °F (35.9 °C)-97.1 °F (36.2 °C)] 97.1 °F (36.2 °C)  Heart Rate:  [64-78] 74  Resp:  [16-18] 18  BP: (100-159)/(60-78) 152/74    Flowsheet Rows        First Filed Value   Admission Height  157.5 cm (62\") Documented at 2021 1136   Admission Weight  125 kg (275 lb) Documented at 2021 1136             I/O last 3 completed shifts:  In: 1530 [P.O.:1530]  Out: -     PHYSICAL EXAM    Physical Exam  Vitals and nursing note reviewed.   Constitutional:       Appearance: She is obese.   HENT:      Head: Normocephalic and atraumatic.   Cardiovascular:      Rate and Rhythm: Normal rate and regular rhythm.   Pulmonary:      Effort: Pulmonary effort is normal.      Breath sounds: Normal breath sounds.   Abdominal:      General: There is distension.   Musculoskeletal:      Right lower leg: Edema present.      Left lower leg: Edema present.   Skin:     General: Skin is warm and dry.      Coloration: Skin is pale.   Neurological:      Mental Status: She is alert. Mental status is at baseline.   Psychiatric:      Comments: irritable         RESULTS   Results Review:    Results from last 7 days   Lab Units 21  0656 21  0648 21  0558   SODIUM mmol/L 135* 132* 134*   POTASSIUM mmol/L 4.6 4.9 4.0   CHLORIDE mmol/L 105 101 105   CO2 mmol/L 22.0 23.0 22.0   BUN mg/dL 26* 27* 31*   CREATININE mg/dL 1.86* 2.01* 2.20*   CALCIUM mg/dL 8.9 8.8 8.4*   BILIRUBIN mg/dL 0.2 <0.2 <0.2   ALK PHOS U/L 184* 198* 189*   ALT (SGPT) U/L 12 12 12   AST " (SGOT) U/L 18 18 17   GLUCOSE mg/dL 125* 287* 287*       Estimated Creatinine Clearance: 40.1 mL/min (A) (by C-G formula based on SCr of 1.86 mg/dL (H)).                Results from last 7 days   Lab Units 04/09/21  0656 04/08/21  0648 04/07/21  0558 04/06/21  0543 04/05/21  0537   WBC 10*3/mm3 12.57* 13.21* 11.29* 9.58 8.05   HEMOGLOBIN g/dL 10.3* 10.6* 9.8* 10.3* 9.3*   PLATELETS 10*3/mm3 344 368 326 317 294               Imaging Results (Last 24 Hours)     Procedure Component Value Units Date/Time    US Guided Vascular Access [[id number]] Collected: 04/08/21 1420     Updated: 04/08/21 1822    Narrative:      PROCEDURE: Ultrasound Guidance Vascular Access      Ordering physician(s): FREDDY MADERA    Clinical Indication: Ultrasound-guided venous access. Left  cephalic vein.      Findings:    The vessel was sonographically evaluated and determined to be  patent. Concurrent realtime ultrasound was used to visualize  needle entry into the left cephalic     vein    and a permanent  image was obtained    for permanent recording and reporting.         Impression:      Impression:     Uneventful ultrasound-guided venous access left cephalic    vein  for midline catheter.       Electronically signed by:  Hugo Russo MD  4/8/2021 6:21 PM CDT  Workstation: [id number]           MEDICATIONS    atorvastatin, 40 mg, Oral, Daily  clindamycin, 600 mg, Oral, Q8H  clopidogrel, 75 mg, Oral, Daily  gabapentin, 400 mg, Oral, TID  heparin (porcine), 5,000 Units, Subcutaneous, Q8H  insulin aspart, 0-24 Units, Subcutaneous, TID AC  insulin aspart, 20 Units, Subcutaneous, TID With Meals  insulin detemir, 35 Units, Subcutaneous, Q12H  ipratropium-albuterol, 3 mL, Nebulization, 4x Daily - RT  isosorbide mononitrate, 30 mg, Oral, Daily  metoprolol tartrate, 100 mg, Oral, Q12H  montelukast, 10 mg, Oral, Nightly  nystatin, , Topical, TID  oxybutynin XL, 5 mg, Oral, Daily  pantoprazole, 40 mg, Oral, Nightly  potassium chloride, 10 mEq, Oral,  Daily  sodium bicarbonate, 1,300 mg, Oral, BID  sodium chloride, 10 mL, Intravenous, Q12H  sodium chloride, 10 mL, Intravenous, Q12H  sodium chloride, 10 mL, Intravenous, Q12H           Assessment/Plan   ASSESSMENT / PLAN      Type 2 diabetes mellitus with hyperosmolar hyperglycemic state (HHS) (CMS/HCC)    Chronic midline low back pain without sciatica    GERD (gastroesophageal reflux disease)    Coronary artery disease    Chronic obstructive lung disease (CMS/HCC)    Acute renal failure superimposed on chronic kidney disease (CMS/HCC)    Current smoker    Hyponatremia    Anemia    Cutaneous candidiasis    Community acquired pneumonia of right middle lobe of lung    MRSA infection    1. Acute Kidney Failure on CKD 4: Baseline creatinine 1.8-2.0 mg/dl  - Developed ERIKA in the setting of DKA.   - UA showed 2+ protein, 1+ blood, 21-30 RBCs, numerous WBCs. Hansels stain is negative.  Urine sodium 115, creatinine 14.9, protein 81.8  - Renal US no hydro  - Creatinine is 1.8 mg/dl, near BL, stop IVF, continue PO bicarb, no current acidosis or anion gap  - Maintain hemodynamics. Monitor I&Os. Avoid nephrotoxins like NSAIDs and IV contrast     2. Hypertension:   - Blood pressure is controlled     3. Anemia:  - Hemoglobin is stable. Finished IV iron     4. DKA: resolved     5. Diabetes type 2     6. Diastolic heart failure- CXR shows no fluid overload 4/8/21     7. Sleep apnea     8. Obesity     9. Metabolic Acidosis- on sodium bicarb 1300mg po bid     Dc home with home health will sign off and see in office in 2-3 weeks         This document has been electronically signed by Ace Lauren MD on April 9, 2021 10:38 CDT

## 2021-04-09 NOTE — THERAPY TREATMENT NOTE
Patient Name: Yamileth Slater  : 1960    MRN: [id number]                              Today's Date: 2021       Admit Date: 3/31/2021    Visit Dx:     ICD-10-CM ICD-9-CM   1. Hyperglycemia  R73.9 790.29   2. Urinary tract infection in female  N39.0 599.0   3. Impaired mobility and ADLs  Z74.09 V49.89    Z78.9    4. Impaired functional mobility, balance, gait, and endurance  Z74.09 V49.89     Patient Active Problem List   Diagnosis   • Type 2 diabetes mellitus with diabetic polyneuropathy, with long-term current use of insulin (CMS/[facility])   • Chronic obstructive pulmonary disease (CMS/[facility])   • Chronic midline low back pain without sciatica   • Vitamin D deficiency   • Type 2 diabetes mellitus (CMS/[facility])   • Tobacco dependence syndrome   • Surgical follow-up care   • Shoulder joint pain   • Peripheral vascular disease (CMS/[facility])   • Pain   • Neurologic disorder associated with diabetes mellitus (CMS/[facility])   • Morbid obesity with BMI of 50.0-59.9, adult (CMS/[facility])   • Mixed hyperlipidemia   • Kidney stone   • History of colon polyps   • GERD (gastroesophageal reflux disease)   • Excoriated eczema   • Essential hypertension   • Encounter for medication refill   • Dyslipidemia   • Diabetes mellitus (CMS/[facility])   • Coronary artery disease   • Chronic obstructive lung disease (CMS/[facility])   • Chronic folliculitis   • Chest pain   • Mild persistent asthma   • Anxiety states   • 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   • Uncontrolled type 2 diabetes mellitus with complication, with long-term current use of insulin (CMS/[facility])   • Acute renal failure superimposed on chronic kidney disease (CMS/[facility])   • Anemia   • Hypothyroidism   • Carotid artery disease (CMS/[facility])   • Current smoker   • DM type 2 with diabetic peripheral neuropathy (CMS/[facility])   • [place] abuse   • PAD (peripheral artery disease) (CMS/[facility])   • Pneumonia of both lungs due to infectious organism   • S/P CABG x 3   • Intercostal pain   •  Venous insufficiency   • Dyspnea on exertion   • LAFB (left anterior fascicular block)   • Pulmonary hypertension (CMS/HCC)   • Left hip pain   • Neck pain   • Stage 3b chronic kidney disease (CMS/HCC)   • MIKE and COPD overlap syndrome (CMS/[facility])   • Pneumonia due to COVID-19 virus   • CKD (chronic kidney disease) stage 4, GFR 15-29 ml/min (CMS/HCC)   • Morbid obesity (CMS/HCC)   • Type 2 diabetes mellitus with hyperosmolar hyperglycemic state (HHS) (CMS/[facility])   • Hyponatremia   • Anemia   • Cutaneous candidiasis   • Community acquired pneumonia of right middle lobe of lung   • MRSA infection     Past Medical History:   Diagnosis Date   • Anxiety    • Carotid artery stenosis    • Chronic obstructive lung disease (CMS/HCC)    • CKD (chronic kidney disease) stage 4, GFR 15-29 ml/min (CMS/HCC)    • Colonic polyp    • Coronary arteriosclerosis    • Diabetes mellitus (CMS/HCC)    • Diabetic neuropathy (CMS/HCC)    • GERD (gastroesophageal reflux disease)    • Hypercholesterolemia    • Hypertension    • Morbid obesity (CMS/[facility])    • Nephrolithiasis    • Peripheral vascular disease (CMS/[facility])    • Sleep apnea    • Substance abuse (CMS/[facility])    • Vitamin D deficiency      Past Surgical History:   Procedure Laterality Date   • CARDIAC CATHETERIZATION N/A 7/14/2020   • CAROTID STENT Left    • COLONOSCOPY     • CORONARY ARTERY BYPASS GRAFT     • CYSTOSCOPY BLADDER STONE LITHOTRIPSY Bilateral      General Information     Row Name 04/09/21 1409          OT Time and Intention    Document Type  progress note/recertification  -BB     Mode of Treatment  individual therapy;occupational therapy  -BB     Row Name 04/09/21 1401          General Information    Patient Profile Reviewed  yes  -BB     Existing Precautions/Restrictions  cardiac;fall  -BB     Row Name 04/09/21 1404          Cognition    Orientation Status (Cognition)  oriented x 4  -BB     Row Name 04/09/21 1401          Safety Issues, Functional Mobility    Impairments Affecting  Function (Mobility)  cognition;balance;coordination;endurance/activity tolerance;sensation/sensory awareness;strength;motor control;motor planning;pain;range of motion (ROM);postural/trunk control  -BB       User Key  (r) = Recorded By, (t) = Taken By, (c) = Cosigned By    Initials Name Provider Type    BB Nissa Bey COTA/L Occupational Therapy Assistant          Mobility/ADL's     Row Name 04/09/21 1405          Bed Mobility    Bed Mobility  bed mobility (all) activities  -BB     Rolling Left [place] (Bed Mobility)  not tested  -BB     Rolling Right [place] (Bed Mobility)  not tested  -BB     Scooting/Bridging [place] (Bed Mobility)  not tested  -BB     Supine-Sit [place] (Bed Mobility)  not tested  -BB     Sit-Supine [place] (Bed Mobility)  not tested  -BB     Bed Mobility, Safety Issues  impaired trunk control for bed mobility  -BB     Assistive Device (Bed Mobility)  bed rails;head of bed elevated;draw sheet  -     Row Name 04/09/21 1405          Transfers    Bed-Chair [place] (Transfers)  not tested  -BB     Sit-Stand [place] (Transfers)  moderate assist (50% patient effort)  -BB     [place] Level (Toilet Transfer)  moderate assist (50% patient effort);1 person assist  -BB     Assistive Device (Toilet Transfer)  walker, platform  -     Row Name 04/09/21 1405          Sit-Stand Transfer    Assistive Device (Sit-Stand Transfers)  walker, rolling platform  -     Row Name 04/09/21 1405          Toilet Transfer    Type (Toilet Transfer)  -- keyshawn care by PT at pt request;  -     Row Name 04/09/21 1405          Functional Mobility    Functional Mobility- Ind. Level  minimum assist (75% patient effort);nonverbal cues required (demo/gesture);verbal cues required  -     Row Name 04/09/21 1405          Mobility    Extremity Weight-bearing Status  right upper extremity  -BB     Right Upper Extremity (Weight-bearing Status)  -- platform attached R to walker ;  -BB      Row Name 04/09/21 1405          Toileting Assessment/Training    [place] Level (Toileting)  toileting skills;dependent (less than 25% patient effort)  -BB     Row Name 04/09/21 1405          Grooming Assessment/Training    [place] Level (Grooming)  wash face, hands;hair care, combing/brushing;supervision;set up  -BB     Position (Grooming)  -- sitting in chair  -BB       User Key  (r) = Recorded By, (t) = Taken By, (c) = Cosigned By    Initials Name Provider Type    Nissa Guillermo COTA/L Occupational Therapy Assistant        Obj/Interventions     Row Name 04/09/21 1405          Vision Assessment/Intervention    Visual Impairment/Limitations  corrective lenses for reading hearing WFL  -BB     [facility] Name 04/09/21 1405          Shoulder (Therapeutic Exercise)    Shoulder Strengthening (Therapeutic Exercise)  bilateral;flexion;extension;horizontal aBduction/aDduction;sitting;2 sets 20 reps  -BB     [facility] Name 04/09/21 1405          Elbow/Forearm (Therapeutic Exercise)    Elbow/Forearm AROM (Therapeutic Exercise)  bilateral;flexion;extension;sitting;2 sets 20 reps  -BB     [facility] Name 04/09/21 1405          Wrist (Therapeutic Exercise)    Wrist AROM (Therapeutic Exercise)  left;flexion;extension;2 sets 20 reps  -BB     Row Name 04/09/21 1405          Hand (Therapeutic Exercise)    Hand AROM/AAROM (Therapeutic Exercise)  bilateral;finger flexion;finger extension;2 sets x20 reps  -BB     Row Name 04/09/21 1405          Therapeutic Exercise    Therapeutic Exercise  shoulder;elbow/forearm;wrist;hand  -BB       User Key  (r) = Recorded By, (t) = Taken By, (c) = Cosigned By    Initials Name Provider Type    Nissa Guillermo COTA/L Occupational Therapy Assistant        Goals/Plan     Row Name 04/09/21 1405          Transfer Goal 1 (OT)    Activity/Assistive Device (Transfer Goal 1, OT)  toilet;commode, bedside with drop arms;commode, bedside without drop arms  -BB     [place] Level/Cues Needed  (Transfer Goal 1, OT)  minimum assist (75% or more patient effort)  -BB     Time Frame (Transfer Goal 1, OT)  long term goal (LTG);by discharge  -BB     Progress/Outcome (Transfer Goal 1, OT)  goal partially met  -BB     Row Name 04/09/21 1405          Bathing Goal 1 (OT)    Activity/Device (Bathing Goal 1, OT)  bathing skills, all  -BB     [place] Level/Cues Needed (Bathing Goal 1, OT)  set-up required;standby assist;verbal cues required  -BB     Time Frame (Bathing Goal 1, OT)  long term goal (LTG);by discharge  -BB     Progress/Outcomes (Bathing Goal 1, OT)  goal not met  -BB     Row Name 04/09/21 1405          Dressing Goal 1 (OT)    Activity/Device (Dressing Goal 1, OT)  dressing skills, all  -BB     [place]/Cues Needed (Dressing Goal 1, OT)  set-up required;standby assist;verbal cues required  -BB     Time Frame (Dressing Goal 1, OT)  long term goal (LTG);by discharge  -BB     Progress/Outcome (Dressing Goal 1, OT)  goal not met  -BB       User Key  (r) = Recorded By, (t) = Taken By, (c) = Cosigned By    Initials Name Provider Type    BB Nissa Bey COTA/L Occupational Therapy Assistant        Clinical Impression     Row Name 04/09/21 140          Pain Scale: Numbers Pre/Post-Treatment    Pretreatment Pain Rating  7/10  -BB     Posttreatment Pain Rating  7/10  -BB     Pain Location  back  -BB     Pain Intervention(s)  Repositioned  -BB     Row Name 04/09/21 1404          Plan of Care Review    Plan of Care Reviewed With  patient caregiver left room during portion of OT tx  -BB     Progress  no change  -BB     Outcome Summary  Pt performed 2 sets x20 reps B UE exercises while sitting in recliner. Pt educated on home safety/fall prevention. Continue OT POC  -BB     Row Name 04/09/21 1403          Therapy Assessment/Plan (OT)    Rehab Potential (OT)  fair, will monitor progress closely  -BB     Criteria for Skilled Therapeutic Interventions Met (OT)  yes;meets criteria  -BB     Therapy  Frequency (OT)  other (see comments) 5-7 days a week  -BB     Row Name 04/09/21 1405          Therapy Plan Review/Discharge Plan (OT)    Anticipated Discharge Disposition (OT)  inpatient rehabilitation facility;skilled nursing facility;home with 24/7 care;home with home health  -BB     Row Name 04/09/21 1401          Vital Signs    Pretreatment Heart Rate (beats/min)  68  -BB     Posttreatment Heart Rate (beats/min)  72  -BB     Pre SpO2 (%)  95  -BB     O2 Delivery Pre Treatment  room air  -BB     Post SpO2 (%)  96  -BB     O2 Delivery Post Treatment  room air  -BB     Pre Patient Position  Sitting  -BB     Post Patient Position  Sitting  -BB     Row Name 04/09/21 1408          Positioning and Restraints    Pre-Treatment Position  sitting in chair/recliner  -BB     Post Treatment Position  chair  -BB     In Chair  sitting;call light within reach;encouraged to call for assist  -BB       User Key  (r) = Recorded By, (t) = Taken By, (c) = Cosigned By    Initials Name Provider Type    Nissa Guillermo COTA/L Occupational Therapy Assistant        Outcome Measures     Row Name 04/09/21 1400          How much help from another is currently needed...    Putting on and taking off regular lower body clothing?  2  -BB     Bathing (including washing, rinsing, and drying)  2  -BB     Toileting (which includes using toilet bed pan or urinal)  1  -BB     Putting on and taking off regular upper body clothing  2  -BB     Taking care of personal grooming (such as brushing teeth)  3  -BB     Eating meals  3  -BB     AM-PAC 6 Clicks Score (OT)  13  -BB       User Key  (r) = Recorded By, (t) = Taken By, (c) = Cosigned By    Initials Name Provider Type    Nissa Guillermo COTA/L Occupational Therapy Assistant        Occupational Therapy Education                 Title: PT OT SLP Therapies (In Progress)     Topic: Occupational Therapy (In Progress)     Point: ADL training (In Progress)     Description:   Instruct learner(s)  on proper safety adaptation and remediation techniques during self care or transfers.   Instruct in proper use of assistive devices.              Learning Progress Summary           Patient Acceptance, E, NR by  at 4/9/2021 1456    Acceptance, E, NR by  at 4/7/2021 1230    Comment: Educated about OT and POC. Educated to call for assist. Educated on safety throughout.    Acceptance, E, NR by BB at 4/4/2021 1309    Acceptance, E, NR by  at 4/2/2021 1223    Comment: Educated about OT and POC. Educated on safety throughout, educated on precuations with right wrist. educated to call for assist.   Family Acceptance, E, NR by  at 4/2/2021 1223    Comment: Educated about OT and POC. Educated on safety throughout, educated on precuations with right wrist. educated to call for assist.                   Point: Home exercise program (Done)     Description:   Instruct learner(s) on appropriate technique for monitoring, assisting and/or progressing therapeutic exercises/activities.              Learning Progress Summary           Patient Acceptance, E, VU by  at 4/6/2021 1450                   Point: Precautions (In Progress)     Description:   Instruct learner(s) on prescribed precautions during self-care and functional transfers.              Learning Progress Summary           Patient Acceptance, E, NR by  at 4/7/2021 1230    Comment: Educated about OT and POC. Educated to call for assist. Educated on safety throughout.    Acceptance, E, NR by  at 4/2/2021 1223    Comment: Educated about OT and POC. Educated on safety throughout, educated on precuations with right wrist. educated to call for assist.   Family Acceptance, E, NR by  at 4/2/2021 1223    Comment: Educated about OT and POC. Educated on safety throughout, educated on precuations with right wrist. educated to call for assist.                   Point: Body mechanics (In Progress)     Description:   Instruct learner(s) on proper positioning and spine  alignment during self-care, functional mobility activities and/or exercises.              Learning Progress Summary           Patient Acceptance, E, NR by  at 4/9/2021 1456                               User Key     Initials Effective Dates Name Provider Type Discipline     06/08/18 -  Mary Jo Michel OTR/L Occupational Therapist OT     03/07/18 -  Nissa Bey COTA/L Occupational Therapy Assistant OT              OT Recommendation and Plan  Therapy Frequency (OT): other (see comments) (5-7 days a week)  Plan of Care Review  Plan of Care Reviewed With: patient  Progress: no change  Outcome Summary: Pt performed 2 sets x20 reps B UE exercises while sitting in recliner. Pt educated on home safety/fall prevention. Continue OT POC     Time Calculation:   Time Calculation- OT     Row Name 04/09/21 1457             Time Calculation- OT    OT Start Time  1405  -BB      OT Stop Time  1444  -      OT Time Calculation (min)  39 min  -      Total Timed Code Minutes- OT  39 minute(s)  -      OT Received On  04/09/21  -        User Key  (r) = Recorded By, (t) = Taken By, (c) = Cosigned By    Initials Name Provider Type    BB Nissa Bey COTA/L Occupational Therapy Assistant        Therapy Charges for Today     Code Description Service Date Service Provider Modifiers Qty    30669217044 HC OT SELF CARE/MGMT/TRAIN EA 15 MIN 4/9/2021 Nissa Bey COTA/L GO 1    14479307806 HC OT THER PROC EA 15 MIN 4/9/2021 Nissa Bey COTA/L GO 2               DARBY Mcgovern  4/9/2021

## 2021-04-10 PROBLEM — Z79.4 TYPE 2 DIABETES MELLITUS WITH HYPERGLYCEMIA, WITH LONG-TERM CURRENT USE OF INSULIN (HCC): Status: ACTIVE | Noted: 2021-03-31

## 2021-04-10 PROBLEM — E11.65 TYPE 2 DIABETES MELLITUS WITH HYPERGLYCEMIA, WITH LONG-TERM CURRENT USE OF INSULIN: Status: ACTIVE | Noted: 2021-03-31

## 2021-04-10 LAB
ALBUMIN SERPL-MCNC: 2.7 G/DL (ref 3.5–5.2)
ALBUMIN/GLOB SERPL: 0.6 G/DL
ALP SERPL-CCNC: 210 U/L (ref 39–117)
ALT SERPL W P-5'-P-CCNC: 14 U/L (ref 1–33)
ANION GAP SERPL CALCULATED.3IONS-SCNC: 9 MMOL/L (ref 5–15)
AST SERPL-CCNC: 21 U/L (ref 1–32)
BASOPHILS # BLD AUTO: 0.08 10*3/MM3 (ref 0–0.2)
BASOPHILS NFR BLD AUTO: 0.8 % (ref 0–1.5)
BILIRUB SERPL-MCNC: 0.2 MG/DL (ref 0–1.2)
BUN SERPL-MCNC: 30 MG/DL (ref 8–23)
BUN/CREAT SERPL: 16.9 (ref 7–25)
CALCIUM SPEC-SCNC: 9.2 MG/DL (ref 8.6–10.5)
CHLORIDE SERPL-SCNC: 104 MMOL/L (ref 98–107)
CO2 SERPL-SCNC: 22 MMOL/L (ref 22–29)
CREAT SERPL-MCNC: 1.77 MG/DL (ref 0.57–1)
DEPRECATED RDW RBC AUTO: 51.4 FL (ref 37–54)
EOSINOPHIL # BLD AUTO: 0.29 10*3/MM3 (ref 0–0.4)
EOSINOPHIL NFR BLD AUTO: 3 % (ref 0.3–6.2)
ERYTHROCYTE [DISTWIDTH] IN BLOOD BY AUTOMATED COUNT: 15.8 % (ref 12.3–15.4)
GFR SERPL CREATININE-BSD FRML MDRD: 29 ML/MIN/1.73
GLOBULIN UR ELPH-MCNC: 4.7 GM/DL
GLUCOSE BLDC GLUCOMTR-MCNC: 183 MG/DL (ref 70–130)
GLUCOSE BLDC GLUCOMTR-MCNC: 246 MG/DL (ref 70–130)
GLUCOSE BLDC GLUCOMTR-MCNC: 256 MG/DL (ref 70–130)
GLUCOSE SERPL-MCNC: 256 MG/DL (ref 65–99)
HCT VFR BLD AUTO: 30.8 % (ref 34–46.6)
HGB BLD-MCNC: 9.7 G/DL (ref 12–15.9)
IMM GRANULOCYTES # BLD AUTO: 0.19 10*3/MM3 (ref 0–0.05)
IMM GRANULOCYTES NFR BLD AUTO: 1.9 % (ref 0–0.5)
LYMPHOCYTES # BLD AUTO: 2.85 10*3/MM3 (ref 0.7–3.1)
LYMPHOCYTES NFR BLD AUTO: 29.2 % (ref 19.6–45.3)
MCH RBC QN AUTO: 28.4 PG (ref 26.6–33)
MCHC RBC AUTO-ENTMCNC: 31.5 G/DL (ref 31.5–35.7)
MCV RBC AUTO: 90.3 FL (ref 79–97)
MONOCYTES # BLD AUTO: 0.53 10*3/MM3 (ref 0.1–0.9)
MONOCYTES NFR BLD AUTO: 5.4 % (ref 5–12)
NEUTROPHILS NFR BLD AUTO: 5.83 10*3/MM3 (ref 1.7–7)
NEUTROPHILS NFR BLD AUTO: 59.7 % (ref 42.7–76)
NRBC BLD AUTO-RTO: 0 /100 WBC (ref 0–0.2)
PLATELET # BLD AUTO: 342 10*3/MM3 (ref 140–450)
PMV BLD AUTO: 8.9 FL (ref 6–12)
POTASSIUM SERPL-SCNC: 4.9 MMOL/L (ref 3.5–5.2)
PROT SERPL-MCNC: 7.4 G/DL (ref 6–8.5)
RBC # BLD AUTO: 3.41 10*6/MM3 (ref 3.77–5.28)
SODIUM SERPL-SCNC: 135 MMOL/L (ref 136–145)
WBC # BLD AUTO: 9.77 10*3/MM3 (ref 3.4–10.8)

## 2021-04-10 PROCEDURE — 94760 N-INVAS EAR/PLS OXIMETRY 1: CPT

## 2021-04-10 PROCEDURE — 97110 THERAPEUTIC EXERCISES: CPT

## 2021-04-10 PROCEDURE — 94799 UNLISTED PULMONARY SVC/PX: CPT

## 2021-04-10 PROCEDURE — 25010000002 HEPARIN (PORCINE) PER 1000 UNITS: Performed by: CHIROPRACTOR

## 2021-04-10 PROCEDURE — 82962 GLUCOSE BLOOD TEST: CPT

## 2021-04-10 PROCEDURE — 99232 SBSQ HOSP IP/OBS MODERATE 35: CPT | Performed by: INTERNAL MEDICINE

## 2021-04-10 PROCEDURE — 63710000001 INSULIN DETEMIR PER 5 UNITS: Performed by: STUDENT IN AN ORGANIZED HEALTH CARE EDUCATION/TRAINING PROGRAM

## 2021-04-10 PROCEDURE — 63710000001 INSULIN ASPART PER 5 UNITS: Performed by: STUDENT IN AN ORGANIZED HEALTH CARE EDUCATION/TRAINING PROGRAM

## 2021-04-10 PROCEDURE — 97530 THERAPEUTIC ACTIVITIES: CPT

## 2021-04-10 PROCEDURE — 80053 COMPREHEN METABOLIC PANEL: CPT | Performed by: CHIROPRACTOR

## 2021-04-10 PROCEDURE — 85025 COMPLETE CBC W/AUTO DIFF WBC: CPT | Performed by: CHIROPRACTOR

## 2021-04-10 PROCEDURE — 97116 GAIT TRAINING THERAPY: CPT

## 2021-04-10 PROCEDURE — 99232 SBSQ HOSP IP/OBS MODERATE 35: CPT | Performed by: CHIROPRACTOR

## 2021-04-10 RX ADMIN — GABAPENTIN 400 MG: 400 CAPSULE ORAL at 15:00

## 2021-04-10 RX ADMIN — INSULIN ASPART 20 UNITS: 100 INJECTION, SOLUTION INTRAVENOUS; SUBCUTANEOUS at 08:47

## 2021-04-10 RX ADMIN — POTASSIUM CHLORIDE 10 MEQ: 750 CAPSULE, EXTENDED RELEASE ORAL at 08:46

## 2021-04-10 RX ADMIN — METOPROLOL TARTRATE 100 MG: 50 TABLET, FILM COATED ORAL at 20:25

## 2021-04-10 RX ADMIN — CLINDAMYCIN HYDROCHLORIDE 600 MG: 150 CAPSULE ORAL at 20:26

## 2021-04-10 RX ADMIN — ATORVASTATIN CALCIUM 40 MG: 40 TABLET, FILM COATED ORAL at 08:46

## 2021-04-10 RX ADMIN — HEPARIN SODIUM 5000 UNITS: 5000 INJECTION INTRAVENOUS; SUBCUTANEOUS at 15:00

## 2021-04-10 RX ADMIN — NYSTATIN: 100000 POWDER TOPICAL at 20:27

## 2021-04-10 RX ADMIN — INSULIN DETEMIR 35 UNITS: 100 INJECTION, SOLUTION SUBCUTANEOUS at 20:25

## 2021-04-10 RX ADMIN — HEPARIN SODIUM 5000 UNITS: 5000 INJECTION INTRAVENOUS; SUBCUTANEOUS at 06:19

## 2021-04-10 RX ADMIN — CALCIUM CARBONATE (ANTACID) CHEW TAB 500 MG 2 TABLET: 500 CHEW TAB at 15:49

## 2021-04-10 RX ADMIN — CLINDAMYCIN HYDROCHLORIDE 600 MG: 150 CAPSULE ORAL at 15:00

## 2021-04-10 RX ADMIN — SODIUM BICARBONATE 1300 MG: 650 TABLET ORAL at 08:46

## 2021-04-10 RX ADMIN — SODIUM CHLORIDE, PRESERVATIVE FREE 10 ML: 5 INJECTION INTRAVENOUS at 08:53

## 2021-04-10 RX ADMIN — GABAPENTIN 400 MG: 400 CAPSULE ORAL at 20:24

## 2021-04-10 RX ADMIN — IPRATROPIUM BROMIDE AND ALBUTEROL SULFATE 3 ML: 2.5; .5 SOLUTION RESPIRATORY (INHALATION) at 11:19

## 2021-04-10 RX ADMIN — NYSTATIN: 100000 POWDER TOPICAL at 08:47

## 2021-04-10 RX ADMIN — PANTOPRAZOLE SODIUM 40 MG: 40 TABLET, DELAYED RELEASE ORAL at 20:25

## 2021-04-10 RX ADMIN — INSULIN ASPART 12 UNITS: 100 INJECTION, SOLUTION INTRAVENOUS; SUBCUTANEOUS at 18:01

## 2021-04-10 RX ADMIN — INSULIN ASPART 24 UNITS: 100 INJECTION, SOLUTION INTRAVENOUS; SUBCUTANEOUS at 12:37

## 2021-04-10 RX ADMIN — INSULIN ASPART 8 UNITS: 100 INJECTION, SOLUTION INTRAVENOUS; SUBCUTANEOUS at 08:46

## 2021-04-10 RX ADMIN — INSULIN ASPART 20 UNITS: 100 INJECTION, SOLUTION INTRAVENOUS; SUBCUTANEOUS at 18:02

## 2021-04-10 RX ADMIN — HEPARIN SODIUM 5000 UNITS: 5000 INJECTION INTRAVENOUS; SUBCUTANEOUS at 20:28

## 2021-04-10 RX ADMIN — INSULIN DETEMIR 35 UNITS: 100 INJECTION, SOLUTION SUBCUTANEOUS at 08:49

## 2021-04-10 RX ADMIN — INSULIN ASPART 20 UNITS: 100 INJECTION, SOLUTION INTRAVENOUS; SUBCUTANEOUS at 12:38

## 2021-04-10 RX ADMIN — CLINDAMYCIN HYDROCHLORIDE 600 MG: 150 CAPSULE ORAL at 06:19

## 2021-04-10 RX ADMIN — SODIUM BICARBONATE 1300 MG: 650 TABLET ORAL at 20:26

## 2021-04-10 RX ADMIN — MONTELUKAST SODIUM 10 MG: 10 TABLET, COATED ORAL at 20:25

## 2021-04-10 RX ADMIN — TRAMADOL HYDROCHLORIDE 50 MG: 50 TABLET, FILM COATED ORAL at 07:08

## 2021-04-10 RX ADMIN — GABAPENTIN 400 MG: 400 CAPSULE ORAL at 08:46

## 2021-04-10 RX ADMIN — OXYBUTYNIN CHLORIDE 5 MG: 5 TABLET, EXTENDED RELEASE ORAL at 08:46

## 2021-04-10 RX ADMIN — METOPROLOL TARTRATE 100 MG: 50 TABLET, FILM COATED ORAL at 08:46

## 2021-04-10 RX ADMIN — TRAMADOL HYDROCHLORIDE 50 MG: 50 TABLET, FILM COATED ORAL at 15:00

## 2021-04-10 RX ADMIN — ISOSORBIDE MONONITRATE 30 MG: 30 TABLET, EXTENDED RELEASE ORAL at 08:46

## 2021-04-10 RX ADMIN — CLOPIDOGREL BISULFATE 75 MG: 75 TABLET ORAL at 08:46

## 2021-04-10 NOTE — PLAN OF CARE
Goal Outcome Evaluation:  Plan of Care Reviewed With: patient     Outcome Summary: pt SBA for sit<>stand, SBA/CGA for toilet t/shala - did require assist for pericare, ambulates ~8', 52', 68' w/ platform RW w/ CGA/SBA x 1 w/ sitting rest break between 2 longer gt distances

## 2021-04-10 NOTE — CONSULTS
"Adult Nutrition  Assessment    Patient Name:  Yamileth Slater  YOB: 1960  MRN: [id number]  Admit Date:  3/31/2021    Assessment Date:  4/10/2021    Comments:   Excellent intake/tolerance of meals.  She does report some dysphagia that she feels Is related to needing her esophagus stretched.  GI consulted and an EGD is planned for Monday for possible dilation.  Pt still with elevated glucose levels, ? If due to metabolic stress.  Pt reports that she has no questions regarding her diet.  RD encouraged good dietary compliance for overall good blood sugar control.  She voices understanding.  Also encouraged healthy eating to achieve a healthy weight .  Diet copies and contact number has been provided.  Reason for Assessment     Row Name 04/10/21 0935          Reason for Assessment    Reason For Assessment  follow-up protocol         Nutrition/Diet History     Row Name 04/10/21 0935          Nutrition/Diet History    Typical Food/Fluid Intake  Pt sitting in chair.  She said that she is doing better but having problems swallowing.  She said that she probably needs her esophagus stretched again.  She denies any questions regarding her ADA diet.  \"I think we got this\"         Anthropometrics     Row Name 04/10/21 0615          Anthropometrics    Weight  127 kg (279 lb 3.2 oz)         Labs/Tests/Procedures/Meds     Row Name 04/10/21 0936          Labs/Procedures/Meds    Lab Results Reviewed  reviewed, pertinent     Lab Results Comments  Gluc 125/359/251/246; Na 135; Bun 30; Creat 1.77; Alb 2.70        Diagnostic Tests/Procedures    Diagnostic Test/Procedure Reviewed  reviewed, pertinent        Medications    Pertinent Medications Reviewed  reviewed, pertinent     Pertinent Medications Comments  Sodium Bicarb; Novolog with meals, 20 units;  Levemir increased to 35units; Clindamycin         Physical Findings     Row Name 04/10/21 0941          Physical Findings    Overall Physical Appearance  on oxygen " therapy;obese           Nutrition Prescription Ordered     Row Name 04/10/21 0941          Nutrition Prescription PO    Current PO Diet  Regular     Fluid Consistency  Thin     Common Modifiers  Cardiac;Consistent Carbohydrate         Evaluation of Received Nutrient/Fluid Intake     Row Name 04/10/21 0941          PO Evaluation    Number of Days PO Intake Evaluated  3 days     Number of Meals  6     % PO Intake  96% average               Electronically signed by:  Gissel Littlejohn, IGOR  04/10/21 09:44 CDT

## 2021-04-10 NOTE — PROGRESS NOTES
FAMILY MEDICINE DAILY PROGRESS NOTE    NAME: Yamileth Slater  : 1960  MRN: [id number]      LOS: 9 days     PROVIDER OF SERVICE: Huy Santa MD    Chief Complaint: Type 2 diabetes mellitus with hyperosmolar hyperglycemic state (HHS) (CMS/[facility])    Subjective:     Interval History:  History taken from: patient chart RN    Patient is alert and cooperative sitting up in her chair this morning.  She is feeling significantly better.  She denies any shortness of breath at the moment.  Vital signs are stable.  Telemetry denies any overnight events.  She is tolerating her antibiotic therapy well.  Overall she appears to be improving.    Review of Systems:   Review of Systems   Constitutional: Negative for chills, fatigue and fever.   HENT: Negative for trouble swallowing.    Respiratory: Negative for cough, choking and shortness of breath.    Cardiovascular: Negative for chest pain and palpitations.   Gastrointestinal: Negative for vomiting.   Neurological: Negative for dizziness, tremors and numbness.       Objective:     Vital Signs  Temp:  [97 °F (36.1 °C)-97.9 °F (36.6 °C)] 97.4 °F (36.3 °C)  Heart Rate:  [69-76] 76  Resp:  [18-22] 18  BP: (140-179)/(72-81) 154/78   Body mass index is 51.07 kg/m².    Physical Exam  Physical Exam  Vitals and nursing note reviewed.   Constitutional:       Appearance: She is obese.   HENT:      Nose: No rhinorrhea.   Eyes:      General: No scleral icterus.  Cardiovascular:      Rate and Rhythm: Normal rate and regular rhythm.      Heart sounds: Normal heart sounds. No murmur heard.   No gallop.    Pulmonary:      Breath sounds: Rhonchi present.   Abdominal:      Tenderness: There is no abdominal tenderness.   Musculoskeletal:      Right lower leg: No edema.      Left lower leg: No edema.   Skin:     Capillary Refill: Capillary refill takes 2 to 3 seconds.      Findings: No lesion or rash.   Neurological:      General: No focal deficit present.   Psychiatric:         Mood  and Affect: Mood normal.         Scheduled Meds   atorvastatin, 40 mg, Oral, Daily  clindamycin, 600 mg, Oral, Q8H  clopidogrel, 75 mg, Oral, Daily  gabapentin, 400 mg, Oral, TID  heparin (porcine), 5,000 Units, Subcutaneous, Q8H  insulin aspart, 0-24 Units, Subcutaneous, TID AC  insulin aspart, 20 Units, Subcutaneous, TID With Meals  insulin detemir, 35 Units, Subcutaneous, Q12H  ipratropium-albuterol, 3 mL, Nebulization, 4x Daily - RT  isosorbide mononitrate, 30 mg, Oral, Daily  metoprolol tartrate, 100 mg, Oral, Q12H  montelukast, 10 mg, Oral, Nightly  nystatin, , Topical, TID  oxybutynin XL, 5 mg, Oral, Daily  pantoprazole, 40 mg, Oral, Nightly  potassium chloride, 10 mEq, Oral, Daily  sodium bicarbonate, 1,300 mg, Oral, BID  sodium chloride, 10 mL, Intravenous, Q12H  sodium chloride, 10 mL, Intravenous, Q12H  sodium chloride, 10 mL, Intravenous, Q12H       PRN Meds   •  acetaminophen  •  albuterol  •  calcium carbonate  •  cyclobenzaprine  •  dextrose  •  dextrose  •  glucagon (human recombinant)  •  nitroglycerin  •  ondansetron ODT  •  promethazine  •  sodium chloride  •  [COMPLETED] Insert peripheral IV **AND** sodium chloride  •  sodium chloride  •  sodium chloride  •  traMADol      Diagnostic Data    Results from last 7 days   Lab Units 04/10/21  0613   WBC 10*3/mm3 9.77   HEMOGLOBIN g/dL 9.7*   HEMATOCRIT % 30.8*   PLATELETS 10*3/mm3 342   GLUCOSE mg/dL 256*   CREATININE mg/dL 1.77*   BUN mg/dL 30*   SODIUM mmol/L 135*   POTASSIUM mmol/L 4.9   AST (SGOT) U/L 21   ALT (SGPT) U/L 14   ALK PHOS U/L 210*   BILIRUBIN mg/dL 0.2   ANION GAP mmol/L 9.0       US Guided Vascular Access    Result Date: 4/8/2021  Impression: Uneventful ultrasound-guided venous access left cephalic    vein for midline catheter.   Electronically signed by:  Hugo Russo MD  4/8/2021 6:21 PM CDT Workstation: [id number]        I reviewed the patient's new clinical results.    Assessment/Plan:     Active Hospital Problems    Diagnosis   POA   • **Type 2 diabetes mellitus with hyperosmolar hyperglycemic state (HHS) (CMS/[facility]) [E11.00, E11.65]  Yes     Priority: High     -Glucose monitoring  -Levemir 35u bid, Aspart 20 units w/meals, ISS   +32 units SSI in last 24 hours  -Consistent carb diet     • Community acquired pneumonia of right middle lobe of lung [J18.9]  Yes     Priority: High     - Confirmed on CXR. CXR 4/5 unremarkable   -Aspiration pneumonitis now suspected.  Patient MRSA positive in nares.  -IV clindamycin 300 mg 3 times daily finish out 10-day course of antibiotic treatment. Started 4/3/21. Switched to Clindamycin 600mg q8h 4/5/21     • Acute renal failure superimposed on chronic kidney disease (CMS/[facility]) [N17.9, N18.9]  Yes     Priority: High     -Hold nephrotoxic drugs  -Nephrology consulted and appreciate recommendations  -Improving  -Baseline: 1.8-2     • MRSA infection [A49.02]  Yes   • Cutaneous candidiasis [B37.2]  Yes     - Nystatin     • Hyponatremia [E87.1]  Yes     -Normal once corrected for hyperglycemia     • Anemia [D64.9]  Yes     -Iron studies indicative of mixed iron deficiency and chronic disease  -Continue home ferrous sulfate       • Chronic midline low back pain without sciatica [M54.5, G89.29]  Yes     - Tramadol PRN     • Chronic obstructive lung disease (CMS/[facility]) [J44.9]  Yes     -O2 supplementation  -Home inhalers as needed  -DuoNebs  -CPAP at night     • GERD (gastroesophageal reflux disease) [K21.9]  Yes     - IV protonix qday  - Tums     • Coronary artery disease [I25.10]  Yes     -Continue home meds     • Current smoker [F17.200]  Yes     -Patch         DVT prophylaxis: Heparin  Code status is   Code Status and Medical Interventions:   Ordered at: 03/31/21 1811     Code Status:    CPR     Medical Interventions (Level of Support Prior to Arrest):    Full       Plan for disposition:Where: home      Time: More than 50% of time spent in counseling and coordination of care:  Total face-to-face/floor time 35 min.   Time spent in counseling 15 min. Counseling included the following topics: Importance of managing her type 2 diabetes with better blood sugar control.         This document has been electronically signed by Huy Santa MD on April 10, 2021 14:38 CDT

## 2021-04-10 NOTE — NURSING NOTE
Patient has been educated about low carb diet after ordering cheeseburger and fries for supper. Called to food & nutrition and explained that the patient is to be strictly on ADA diet and she is not to be allowe the to deviate from the menu that is offered.

## 2021-04-10 NOTE — THERAPY TREATMENT NOTE
Patient Name: Yamileth Slater  : 1960    MRN: [id number]                              Today's Date: 4/10/2021       Admit Date: 3/31/2021    Visit Dx:     ICD-10-CM ICD-9-CM   1. Hyperglycemia  R73.9 790.29   2. Urinary tract infection in female  N39.0 599.0   3. Impaired mobility and ADLs  Z74.09 V49.89    Z78.9    4. Impaired functional mobility, balance, gait, and endurance  Z74.09 V49.89     Patient Active Problem List   Diagnosis   • Type 2 diabetes mellitus with diabetic polyneuropathy, with long-term current use of insulin (CMS/[facility])   • Chronic obstructive pulmonary disease (CMS/[facility])   • Chronic midline low back pain without sciatica   • Vitamin D deficiency   • Type 2 diabetes mellitus (CMS/[facility])   • Tobacco dependence syndrome   • Surgical follow-up care   • Shoulder joint pain   • Peripheral vascular disease (CMS/[facility])   • Pain   • Neurologic disorder associated with diabetes mellitus (CMS/[facility])   • Morbid obesity with BMI of 50.0-59.9, adult (CMS/[facility])   • Mixed hyperlipidemia   • Kidney stone   • History of colon polyps   • GERD (gastroesophageal reflux disease)   • Excoriated eczema   • Essential hypertension   • Encounter for medication refill   • Dyslipidemia   • Diabetes mellitus (CMS/[facility])   • Coronary artery disease   • Chronic obstructive lung disease (CMS/[facility])   • Chronic folliculitis   • Chest pain   • Mild persistent asthma   • Anxiety states   • 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   • Uncontrolled type 2 diabetes mellitus with complication, with long-term current use of insulin (CMS/[facility])   • Acute renal failure superimposed on chronic kidney disease (CMS/[facility])   • Anemia   • Hypothyroidism   • Carotid artery disease (CMS/[facility])   • Current smoker   • DM type 2 with diabetic peripheral neuropathy (CMS/[facility])   • [place] abuse   • PAD (peripheral artery disease) (CMS/[facility])   • Pneumonia of both lungs due to infectious organism   • S/P CABG x 3   • Intercostal pain   •  Venous insufficiency   • Dyspnea on exertion   • LAFB (left anterior fascicular block)   • Pulmonary hypertension (CMS/H[facility])   • Left hip pain   • Neck pain   • Stage 3b chronic kidney disease (CMS/HCC)   • MIKE and COPD overlap syndrome (CMS/[facility])   • Pneumonia due to COVID-19 virus   • CKD (chronic kidney disease) stage 4, GFR 15-29 ml/min (CMS/HCC)   • Morbid obesity (CMS/[facility])   • Type 2 diabetes mellitus with hyperglycemia, with long-term current use of insulin (CMS/[facility])   • Hyponatremia   • Anemia   • Cutaneous candidiasis   • Community acquired pneumonia of right middle lobe of lung   • MRSA infection     Past Medical History:   Diagnosis Date   • Anxiety    • Carotid artery stenosis    • Chronic obstructive lung disease (CMS/HCC)    • CKD (chronic kidney disease) stage 4, GFR 15-29 ml/min (CMS/[facility])    • Colonic polyp    • Coronary arteriosclerosis    • Diabetes mellitus (CMS/HCC)    • Diabetic neuropathy (CMS/[facility])    • GERD (gastroesophageal reflux disease)    • Hypercholesterolemia    • Hypertension    • Morbid obesity (CMS/[facility])    • Nephrolithiasis    • Peripheral vascular disease (CMS/[facility])    • Sleep apnea    • Substance abuse (CMS/[facility])    • Vitamin D deficiency      Past Surgical History:   Procedure Laterality Date   • CARDIAC CATHETERIZATION N/A 7/14/2020   • CAROTID STENT Left    • COLONOSCOPY     • CORONARY ARTERY BYPASS GRAFT     • CYSTOSCOPY BLADDER STONE LITHOTRIPSY Bilateral      General Information     Row Name 04/10/21 1448          OT Time and Intention    Document Type  therapy note (daily note)  -MICHELLE     Mode of Treatment  individual therapy;occupational therapy  -     Row Name 04/10/21 1448          General Information    Patient Profile Reviewed  yes  -MICHELLE     Existing Precautions/Restrictions  cardiac;fall  -MICHELLE     Row Name 04/10/21 1440          Cognition    Orientation Status (Cognition)  oriented x 4  -MICHELLE     Row Name 04/10/21 144          Safety Issues, Functional Mobility    Impairments  Affecting Function (Mobility)  cognition;balance;coordination;endurance/activity tolerance;sensation/sensory awareness;strength;motor control;motor planning;pain;range of motion (ROM);postural/trunk control  -       User Key  (r) = Recorded By, (t) = Taken By, (c) = Cosigned By    Initials Name Provider Type    Yamilex Mejia OTA Occupational Therapy Assistant          Mobility/ADL's     Row Name 04/10/21 1449          Transfers    [place] Level (Toilet Transfer)  -- pt did require assist for pericare  -     Row Name 04/10/21 1449          Mobility    Extremity Weight-bearing Status  right upper extremity  -MICHELLE     Right Upper Extremity (Weight-bearing Status)  -- platform attached R to walker ;  -     Row Name 04/10/21 1449          Grooming Assessment/Training    Position (Grooming)  -- sitting in chair  -MICHELLE       User Key  (r) = Recorded By, (t) = Taken By, (c) = Cosigned By    Initials Name Provider Type    Yamilex Mejia OTA Occupational Therapy Assistant        Obj/Interventions     Row Name 04/10/21 1450          Vision Assessment/Intervention    Visual Impairment/Limitations  corrective lenses for reading hearing WFL  -     Row Name 04/10/21 1450          Shoulder (Therapeutic Exercise)    Shoulder Strengthening (Therapeutic Exercise)  bilateral;flexion;extension;aBduction;aDduction;horizontal aBduction/aDduction;external rotation;internal rotation;1 lb free weight;3 lb free weight;sitting 2x20  -     Row Name 04/10/21 1450          Elbow/Forearm (Therapeutic Exercise)    Elbow/Forearm (Therapeutic Exercise)  strengthening exercise  -     Elbow/Forearm Strengthening (Therapeutic Exercise)  bilateral;flexion;extension;supination;pronation;sitting;1 lb free weight;3 lb free weight 2x20  -     Row Name 04/10/21 1450          Wrist (Therapeutic Exercise)    Wrist Strengthening (Therapeutic Exercise)  bilateral;flexion;extension;1 lb free weight;3 lb free weight 2x20  -       User Key   (r) = Recorded By, (t) = Taken By, (c) = Cosigned By    Initials Name Provider Type    Yamilex Mejia OTA Occupational Therapy Assistant        Goals/Plan     Row Name 04/10/21 1450          Transfer Goal 1 (OT)    Activity/Assistive Device (Transfer Goal 1, OT)  toilet;commode, bedside with drop arms;commode, bedside without drop arms  -MICHELLE     [place] Level/Cues Needed (Transfer Goal 1, OT)  minimum assist (75% or more patient effort)  -MICHELLE     Time Frame (Transfer Goal 1, OT)  long term goal (LTG);by discharge  -MICHELLE     Progress/Outcome (Transfer Goal 1, OT)  goal partially met  -MICHELLE     [facility] Name 04/10/21 1450          Bathing Goal 1 (OT)    Activity/Device (Bathing Goal 1, OT)  bathing skills, all  -MICHELLE     [place] Level/Cues Needed (Bathing Goal 1, OT)  set-up required;standby assist;verbal cues required  -MICHELLE     Time Frame (Bathing Goal 1, OT)  long term goal (LTG);by discharge  -MICHELLE     Progress/Outcomes (Bathing Goal 1, OT)  goal not met  -MICHELLE     Row Name 04/10/21 1450          Dressing Goal 1 (OT)    Activity/Device (Dressing Goal 1, OT)  dressing skills, all  -MICHELLE     [place]/Cues Needed (Dressing Goal 1, OT)  set-up required;standby assist;verbal cues required  -MICHELLE     Time Frame (Dressing Goal 1, OT)  long term goal (LTG);by discharge  -MICHELLE     Progress/Outcome (Dressing Goal 1, OT)  goal not met  -MICHELLE       User Key  (r) = Recorded By, (t) = Taken By, (c) = Cosigned By    Initials Name Provider Type    Yamilex Mejia OTA Occupational Therapy Assistant        Clinical Impression     [facility] Name 04/10/21 1449          Pain Scale: Numbers Pre/Post-Treatment    Pretreatment Pain Rating  7/10  -MICHELLE     Pain Location - Side  Left  -MICHELLE     Pain Location  extremity  -MICHELLE     Row Name 04/10/21 1449          Plan of Care Review    Plan of Care Reviewed With  patient  -MICHELLE     Row Name 04/10/21 1449          Therapy Assessment/Plan (OT)    Rehab Potential (OT)  fair, will monitor progress closely  -MICHELLE      Criteria for Skilled Therapeutic Interventions Met (OT)  yes;meets criteria  -MICHELLE     Therapy Frequency (OT)  other (see comments) 5-7 days a week  -MICHELLE     Row Name 04/10/21 1449          Therapy Plan Review/Discharge Plan (OT)    Anticipated Discharge Disposition (OT)  inpatient rehabilitation facility;skilled nursing facility;home with 24/7 care;home with home health  -MICHELLE     Row Name 04/10/21 1449          Vital Signs    Pre Systolic BP Rehab  142  -MICHELLE     Pre Treatment Diastolic BP  52  -MICHELLE     Pretreatment Heart Rate (beats/min)  68  -MICHELLE     Pre SpO2 (%)  95  -MICHELLE     O2 Delivery Pre Treatment  room air  -MICHELLE     Pre Patient Position  Sitting  -MICHELLE     Intra Patient Position  Sitting  -MICHELLE     Post Patient Position  Sitting  -MICHELLE     Row Name 04/10/21 1449          Positioning and Restraints    Pre-Treatment Position  sitting in chair/recliner  -MICHELLE     Post Treatment Position  chair  -MICHELLE     In Chair  reclined;call light within reach;encouraged to call for assist;exit alarm on  -MICHELLE       User Key  (r) = Recorded By, (t) = Taken By, (c) = Cosigned By    Initials Name Provider Type    Yamilex Mejia OTA Occupational Therapy Assistant        Outcome Measures     Row Name 04/10/21 1450          How much help from another is currently needed...    Putting on and taking off regular lower body clothing?  2  -MICHELLE     Bathing (including washing, rinsing, and drying)  2  -MICHELLE     Toileting (which includes using toilet bed pan or urinal)  1  -MICHELLE     Putting on and taking off regular upper body clothing  2  -MICHELLE     Taking care of personal grooming (such as brushing teeth)  3  -MICHELLE     Eating meals  3  -MICHELLE     AM-PAC 6 Clicks Score (OT)  13  -MICHELLE     Row Name 04/10/21 1450          Functional Assessment    Outcome Measure Options  AM-PAC 6 Clicks Daily Activity (OT)  -MICHELLE       User Key  (r) = Recorded By, (t) = Taken By, (c) = Cosigned By    Initials Name Provider Type    Yamilex Mejia OTA Occupational Therapy Assistant         Occupational Therapy Education                 Title: PT OT SLP Therapies (In Progress)     Topic: Occupational Therapy (In Progress)     Point: ADL training (In Progress)     Description:   Instruct learner(s) on proper safety adaptation and remediation techniques during self care or transfers.   Instruct in proper use of assistive devices.              Learning Progress Summary           Patient Acceptance, E, NR by  at 4/9/2021 1456    Acceptance, E, NR by  at 4/7/2021 1230    Comment: Educated about OT and POC. Educated to call for assist. Educated on safety throughout.    Acceptance, E, NR by BB at 4/4/2021 1309    Acceptance, E, NR by  at 4/2/2021 1223    Comment: Educated about OT and POC. Educated on safety throughout, educated on precuations with right wrist. educated to call for assist.   Family Acceptance, E, NR by  at 4/2/2021 1223    Comment: Educated about OT and POC. Educated on safety throughout, educated on precuations with right wrist. educated to call for assist.                   Point: Home exercise program (Done)     Description:   Instruct learner(s) on appropriate technique for monitoring, assisting and/or progressing therapeutic exercises/activities.              Learning Progress Summary           Patient Acceptance, E, VU by  at 4/6/2021 1450                   Point: Precautions (In Progress)     Description:   Instruct learner(s) on prescribed precautions during self-care and functional transfers.              Learning Progress Summary           Patient Acceptance, E, NR by  at 4/7/2021 1230    Comment: Educated about OT and POC. Educated to call for assist. Educated on safety throughout.    Acceptance, E, NR by  at 4/2/2021 1223    Comment: Educated about OT and POC. Educated on safety throughout, educated on precuations with right wrist. educated to call for assist.   Family Acceptance, E, NR by  at 4/2/2021 1223    Comment: Educated about OT and POC. Educated on  safety throughout, educated on precuations with right wrist. educated to call for assist.                   Point: Body mechanics (Done)     Description:   Instruct learner(s) on proper positioning and spine alignment during self-care, functional mobility activities and/or exercises.              Learning Progress Summary           Patient Acceptance, E, VU,DU by MICHELLE at 4/10/2021 1459    Comment: Pt edu on OT and POC. Pt edu on proper body mechanics when performing BUE ther ex.    Acceptance, E, NR by BB at 4/9/2021 1456                               User Key     Initials Effective Dates Name Provider Type Discipline     06/08/18 -  Mary Jo Michel OTR/L Occupational Therapist OT     03/07/18 -  Nissa Bey OLMSTEAD/L Occupational Therapy Assistant OT    MICHELLE 11/05/19 -  Yamilex Jenkins OTA Occupational Therapy Assistant OT              OT Recommendation and Plan  Therapy Frequency (OT): other (see comments) (5-7 days a week)  Plan of Care Review  Plan of Care Reviewed With: patient  Outcome Summary: Pt agreed to tx. Pt deferred ADL's having     Time Calculation:   Time Calculation- OT     Row Name 04/10/21 1519             Time Calculation- OT    OT Start Time  1435  -MICHELLE      OT Stop Time  1515  -MICHELLE      OT Time Calculation (min)  40 min  -      Total Timed Code Minutes- OT  40 minute(s)  -MICHELLE      OT Received On  04/10/21  -        User Key  (r) = Recorded By, (t) = Taken By, (c) = Cosigned By    Initials Name Provider Type    MICHELLE Yamilex Jenkins OTA Occupational Therapy Assistant        Therapy Charges for Today     Code Description Service Date Service Provider Modifiers Qty    [id number] HC OT THER PROC EA 15 MIN 4/10/2021 Yamilex Jenkins OTA GO 3               CASSIE Bailey  4/10/2021

## 2021-04-10 NOTE — THERAPY TREATMENT NOTE
Acute Care - Physical Therapy Treatment Note  [facility]     Patient Name: Yamileth Slater  : 1960  MRN: [id number]  Today's Date: 4/10/2021           PT Assessment (last 12 hours)      PT Evaluation and Treatment     Row Name 04/10/21 133          Physical Therapy Time and Intention    Subjective Information  no complaints  -     Document Type  therapy note (daily note)  -     Mode of Treatment  individual therapy;physical therapy  -     Patient Effort  good  -     Row Name 04/10/21 133          General Information    Patient Profile Reviewed  yes  -     Existing Precautions/Restrictions  cardiac;fall  -     Row Name 04/10/21 133          Sensory    Hearing Status  WFL  -     Row Name 04/10/21 1330          Cognition    Orientation Status (Cognition)  oriented x 4  -[facility] Name 04/10/21 1330          Pain Scale: Numbers Pre/Post-Treatment    Pretreatment Pain Rating  0/10 - no pain  -     Posttreatment Pain Rating  0/10 - no pain  -[facility] Name 04/10/21 1330          Mobility    Extremity Weight-bearing Status  right upper extremity  -     Right Upper Extremity (Weight-bearing Status)  -- platform attached R to walker ;  -     Row Name 04/10/21 133          Bed Mobility    Comment (Bed Mobility)  pt stays up in recliner in room  -[facility] Name 04/10/21 1330          Transfers    Transfers  sit-stand transfer;stand-sit transfer  -     Sit-Stand [place] (Transfers)  standby assist;1 person assist  -     Stand-Sit [place] (Transfers)  standby assist;1 person assist  -     [place] Level (Toilet Transfer)  standby assist;contact guard pt did require assist for pericare  -     Assistive Device (Toilet Transfer)  grab bars/safety frame  -     Row Name 04/10/21 133          Toilet Transfer    Type (Toilet Transfer)  sit-stand;stand-sit  -[facility] Name 04/10/21 1330          Gait/Stairs (Locomotion)    Gait/Stairs Locomotion  gait/ambulation  independence;gait/ambulation assistive device;distance ambulated;gait pattern;gait deviations  -     [place] Level (Gait)  standby assist;contact guard  -     Assistive Device (Gait)  walker, rolling platform  -     Distance in Feet (Gait)  8', 52', 68'  -JW     Pattern (Gait)  step-to  -JW     Deviations/Abnormal Patterns (Gait)  nasir decreased  -     Comment (Gait/Stairs)  pt required sitting rest break between 2 long gt distances secondary to beginning to have some pain  -     Row Name 04/10/21 1330          Plan of Care Review    Plan of Care Reviewed With  patient  -     Outcome Summary  pt SBA for sit<>stand, SBA/CGA for toilet t/shala - did require assist for pericare, ambulates ~8', 52', 68' w/ platform RW w/ CGA/SBA x 1 w/ sitting rest break between 2 longer gt distances  -[facility] Name 04/10/21 1330          Vital Signs    Pre Patient Position  Sitting  -     Intra Patient Position  Standing  -JW     Post Patient Position  Sitting  -[facility] Name 04/10/21 1330          Bed Mobility Goal 1 (PT)    Activity/Assistive Device (Bed Mobility Goal 1, PT)  bed mobility activities, all  -JW     [place] Level/Cues Needed (Bed Mobility Goal 1, PT)  moderate assist (50-74% patient effort);minimum assist (75% or more patient effort);1 person assist  -     Time Frame (Bed Mobility Goal 1, PT)  1 week  -     Progress/Outcomes (Bed Mobility Goal 1, PT)  goal partially met;continuing progress toward goal  -JW     Row Name 04/10/21 1850          Transfer Goal 1 (PT)    Activity/Assistive Device (Transfer Goal 1, PT)  bed-to-chair/chair-to-bed  -     [place] Level/Cues Needed (Transfer Goal 1, PT)  minimum assist (75% or more patient effort);moderate assist (50-74% patient effort);2 person assist  -     Progress/Outcome (Transfer Goal 1, PT)  continuing progress toward goal;goal met  -[facility] Name 04/10/21 7869          Gait Training Goal 1 (PT)    Activity/Assistive Device (Gait  Training Goal 1, PT)  gait (walking locomotion);assistive device use  -     [place] Level (Gait Training Goal 1, PT)  moderate assist (50-74% patient effort);minimum assist (75% or more patient effort);2 person assist  -JW     Distance (Gait Training Goal 1, PT)  100 ft or more  -JW     Time Frame (Gait Training Goal 1, PT)  2 weeks  -JW     Progress/Outcome (Gait Training Goal 1, PT)  continuing progress toward goal;goal partially met  -     Row Name 04/10/21 1330          ROM Goal 1 (PT)    ROM Goal 1 (PT)  [facility] activley for hip, knee and ankle flx ex 20 reps x 2  -JW     Time Frame (ROM Goal 1, PT)  4 days  -JW     Progress/Outcome (ROM Goal 1, PT)  goal not met  -     Row Name 04/10/21 1330          Stairs Goal 1 (PT)    Activity/Assistive Device (Stairs Goal 1, PT)  ascending stairs;descending stairs  -     [place] Level/Cues Needed (Stairs Goal 1, PT)  modified independence  -     Number of Stairs (Stairs Goal 1, PT)  2 or more as lopez;  -     Time Frame (Stairs Goal 1, PT)  long term goal (LTG);3 weeks  -     Progress/Outcome (Stairs Goal 1, PT)  goal not met  -     Row Name 04/10/21 1330          Positioning and Restraints    Pre-Treatment Position  sitting in chair/recliner  -     Post Treatment Position  chair  -JW     In Chair  sitting;call light within reach;encouraged to call for assist  -JW       User Key  (r) = Recorded By, (t) = Taken By, (c) = Cosigned By    Initials Name Provider Type    Dalila Jacobo, PTA Physical Therapy Assistant        Physical Therapy Education                 Title: PT OT SLP Therapies (In Progress)     Topic: Physical Therapy (In Progress)     Point: Mobility training (Done)     Learning Progress Summary           Patient Acceptance, D,E, NR,VU,DU by JUANCARLOS at 4/7/2021 1633    Comment: using platform FWRW for mobility;    Acceptance, E,TB, VU,NR,DU by ERIN at 4/6/2021 1430    Comment: home safety,room safety    Acceptance, D,E, NR by JUANCARLOS  at 4/1/2021 1056    Comment: POC: instructions for eval/ex to encourage active  mobility   Family Acceptance, E,TB, VU,NR,DU by LN at 4/6/2021 1430    Comment: home safety,room safety                   Point: Home exercise program (Not Started)     Learner Progress:  Not documented in this visit.          Point: Body mechanics (Done)     Learning Progress Summary           Patient Acceptance, E,TB, VU,NR,DU by LN at 4/6/2021 1430    Comment: home safety,room safety   Family Acceptance, E,TB, VU,NR,DU by LN at 4/6/2021 1430    Comment: home safety,room safety                   Point: Precautions (Done)     Learning Progress Summary           Patient Acceptance, D,E, NR,VU,DU by GB at 4/7/2021 1633    Comment: using platform FWRW for mobility;    Acceptance, E,TB, VU,NR,DU by LN at 4/6/2021 1430    Comment: home safety,room safety   Family Acceptance, E,TB, VU,NR,DU by LN at 4/6/2021 1430    Comment: home safety,room safety                               User Key     Initials Effective Dates Name Provider Type Discipline     04/03/18 -  Gauri Anderson, PT Physical Therapist PT    LN 03/07/18 -  Lor Tavarez, PTA Physical Therapy Assistant PT              PT Recommendation and Plan  Anticipated Discharge Disposition (PT): sub acute care setting, long-term acute care facility  Plan of Care Reviewed With: patient  Outcome Summary: pt SBA for sit<>stand, SBA/CGA for toilet t/shala - did require assist for pericare, ambulates ~8', 52', 68' w/ platform RW w/ CGA/SBA x 1 w/ sitting rest break between 2 longer gt distances  Outcome Measures     Row Name 04/07/21 1600             How much help from another person do you currently need...    Turning from your back to your side while in flat bed without using bedrails?  3  -GB      Moving from lying on back to sitting on the side of a flat bed without bedrails?  2  -GB      Moving to and from a bed to a chair (including a wheelchair)?  3  -GB      Standing up from a  chair using your arms (e.g., wheelchair, bedside chair)?  2  -GB      Climbing 3-5 steps with a railing?  2  -GB      To walk in hospital room?  3  -GB      AM-PAC 6 Clicks Score (PT)  15  -GB         Functional Assessment    Outcome Measure Options  AM-PAC 6 Clicks Basic Mobility (PT)  -GB        User Key  (r) = Recorded By, (t) = Taken By, (c) = Cosigned By    Initials Name Provider Type    Gauri Kohli, PT Physical Therapist           Time Calculation:   PT Charges     Row Name 04/10/21 1330             Time Calculation    Start Time  1330  -      Stop Time  1354  -      Time Calculation (min)  24 min  -      PT Received On  04/10/21  -         Time Calculation- PT    Total Timed Code Minutes- PT  24 minute(s)  -        User Key  (r) = Recorded By, (t) = Taken By, (c) = Cosigned By    Initials Name Provider Type    Dalila Jacobo PTA Physical Therapy Assistant        Therapy Charges for Today     Code Description Service Date Service Provider Modifiers Qty    84512430531 HC GAIT TRAINING EA 15 MIN 4/10/2021 Dalila Pacheco, PTA GP 1    33299886695 HC PT THERAPEUTIC ACT EA 15 MIN 4/10/2021 Dalila Pacheco, PTA GP 1          PT G-Codes  Outcome Measure Options: AM-PAC 6 Clicks Basic Mobility (PT)  AM-PAC 6 Clicks Score (PT): 15  AM-PAC 6 Clicks Score (OT): 13    Dalila Pacheco PTA  4/10/2021

## 2021-04-10 NOTE — PROGRESS NOTES
SUBJECTIVE:   4/10/2021  Chief Complaint:     Subjective      Patient had chest pain and dysphagia earlier today.  Feels better currently.  Denied abdominal pain, nausea or vomiting.  Tolerating diet well.  Awake and alert.  Serum sodium is 135.  Hemoglobin is 9.7.    History:  Past Medical History:   Diagnosis Date   • Anxiety    • Carotid artery stenosis    • Chronic obstructive lung disease (CMS/[facility])    • CKD (chronic kidney disease) stage 4, GFR 15-29 ml/min (CMS/[facility])    • Colonic polyp    • Coronary arteriosclerosis    • Diabetes mellitus (CMS/[facility])    • Diabetic neuropathy (CMS/[facility])    • GERD (gastroesophageal reflux disease)    • Hypercholesterolemia    • Hypertension    • Morbid obesity (CMS/[facility])    • Nephrolithiasis    • Peripheral vascular disease (CMS/[facility])    • Sleep apnea    • Substance abuse (CMS/[facility])    • Vitamin D deficiency      Past Surgical History:   Procedure Laterality Date   • CARDIAC CATHETERIZATION N/A 7/14/2020   • CAROTID STENT Left    • COLONOSCOPY     • CORONARY ARTERY BYPASS GRAFT     • CYSTOSCOPY BLADDER STONE LITHOTRIPSY Bilateral      Family History   Problem Relation Age of Onset   • Heart disease Mother    • Heart disease Father    • Heart disease Sister    • Heart disease Brother      Social History     Tobacco Use   • Smoking status: Light Tobacco Smoker     Packs/day: 0.25     Years: 46.00     Pack years: 11.50     Types: Cigarettes   • Smokeless tobacco: Never Used   • Tobacco comment: only smoking 5 a day    Substance Use Topics   • Alcohol use: No   • Drug use: Not Currently     Types: LSD, Marijuana, Methamphetamines     Medications Prior to Admission   Medication Sig Dispense Refill Last Dose   • albuterol sulfate  (90 Base) MCG/ACT inhaler Inhale 2 puffs Every 4 (Four) Hours As Needed for Wheezing. 18 g 1    • amoxicillin-clavulanate (Augmentin) 875-125 MG per tablet Take 1 tablet by mouth Daily. 10 tablet 0    • atorvastatin (LIPITOR) 40 MG tablet Take 1  tablet by mouth Daily. 90 tablet 0    • azithromycin (ZITHROMAX) 250 MG tablet       • Blood Glucose Monitoring Suppl ([employer] Blood Glucose Meter) w/Device kit 1 each 3 (Three) Times a Day. 1 kit 3    • cetirizine (zyrTEC) 10 MG tablet Take 1 tablet by mouth Daily. 30 tablet 3    • clopidogrel (PLAVIX) 75 MG tablet Take 1 tablet by mouth Daily. 30 tablet 5    • Continuous Blood Gluc  (FreeStyle Jade 2 [place]) device 1 each Continuous. 1 each 0    • Continuous Blood Gluc Sensor (FreeStyle Jade 2 Sensor) misc 1 each Every 14 (Fourteen) Days. 2 each 11    • cyclobenzaprine (FLEXERIL) 10 MG tablet Take 1 tablet by mouth 2 (Two) Times a Day As Needed for Muscle Spasms. 60 tablet 5    • DULoxetine (Cymbalta) 20 MG capsule Take 1 capsule by mouth Daily. 30 capsule 0    • EASY TOUCH PEN NEEDLES 31G X 8 MM misc       • Empagliflozin (Jardiance) 25 MG tablet Take 25 mg by mouth Daily. 90 tablet 5    • ferrous sulfate (FeroSul) 325 (65 FE) MG tablet Take 1 tablet by mouth Daily With Breakfast. 30 tablet 3    • gabapentin (NEURONTIN) 800 MG tablet Take 1 tablet by mouth 3 (Three) Times a Day. 90 tablet 0    • glucose blood test strip Use as instructed 100 each 12    • insulin aspart (novoLOG FLEXPEN) 100 UNIT/ML solution pen-injector sc pen Inject 5 Units under the skin into the appropriate area as directed 3 (Three) Times a Day With Meals. 1 pen 1    • Insulin Glargine (Lantus SoloStar) 100 UNIT/ML injection pen Inject 100 Units under the skin into the appropriate area as directed Every 12 (Twelve) Hours. 60 mL 11    • Insulin Pen Needle (NovoFine) 30G X 8 MM misc As directed 4 times daily 100 each 11    • isosorbide mononitrate (IMDUR) 30 MG 24 hr tablet Take 1 tablet by mouth Daily. 90 tablet 2    • lidocaine (LIDODERM) 5 % PLACE 1 PATCH ON THE SKIN AS DIRECTED BY PROVIDER DAILY. REMOVE & DISCARD PATCH WITHIN 12 HOURS OR AS DIRECTED BY MD 30 patch 3    • lisinopril (PRINIVIL,ZESTRIL) 10 MG tablet Take 1 tablet by  mouth Daily. 30 tablet 5    • methylPREDNISolone (MEDROL) 4 MG dose pack       • metoprolol tartrate (LOPRESSOR) 100 MG tablet Take 1 tablet by mouth Every 12 (Twelve) Hours for 30 days. (Patient taking differently: Take 100 mg by mouth 2 (Two) Times a Day.) 60 tablet 0    • montelukast (SINGULAIR) 10 MG tablet Take 1 tablet by mouth Every Night. 30 tablet 5    • nitroglycerin (NITROSTAT) 0.4 MG SL tablet Place 1 tablet under the tongue As Needed for Chest Pain. Take no more than 3 doses in 15 minutes. 30 tablet 3    • nystatin (MYCOSTATIN) 163514 UNIT/GM powder Apply  topically to the appropriate area as directed 3 (Three) Times a Day. 15 g 1    • ondansetron ODT (Zofran ODT) 4 MG disintegrating tablet Place 1 tablet on the tongue Every 8 (Eight) Hours As Needed for Nausea or Vomiting. 30 tablet 0    • oxybutynin XL (DITROPAN-XL) 5 MG 24 hr tablet Take 1 tablet by mouth Daily. 90 tablet 1    • pantoprazole (PROTONIX) 40 MG EC tablet Take 1 tablet by mouth Every Night. 30 tablet 5    • potassium chloride (MICRO-K) 10 MEQ CR capsule Take 1 capsule by mouth Daily. 30 capsule 5    • promethazine (PHENERGAN) 25 MG tablet Take 1 tablet by mouth Every 6 (Six) Hours As Needed for Nausea or Vomiting. 30 tablet 0    • sodium bicarbonate 325 MG tablet Take 1 tablet by mouth 2 (Two) Times a Day. 60 tablet 5      Allergies:  Adhesive tape and Other     CURRENT MEDICATIONS/OBJECTIVE/VS/PE:     Current Medications:     Current Facility-Administered Medications   Medication Dose Route Frequency Provider Last Rate Last Admin   • acetaminophen (TYLENOL) tablet 650 mg  650 mg Oral Q6H PRN Huy Santa MD   650 mg at 04/09/21 0606   • albuterol (PROVENTIL) nebulizer solution 0.083% 2.5 mg/3mL  2.5 mg Nebulization Q4H PRN Huy Santa MD       • atorvastatin (LIPITOR) tablet 40 mg  40 mg Oral Daily Huy Santa MD   40 mg at 04/10/21 0846   • calcium carbonate (TUMS) chewable tablet 500 mg (200 mg elemental)  2 tablet Oral BID  PRN Nirmal Calvillo MD   2 tablet at 04/09/21 1801   • clindamycin (CLEOCIN) capsule 600 mg  600 mg Oral Q8H Nirmal Calvillo MD   600 mg at 04/10/21 0619   • clopidogrel (PLAVIX) tablet 75 mg  75 mg Oral Daily Huy Santa MD   75 mg at 04/10/21 0846   • cyclobenzaprine (FLEXERIL) tablet 10 mg  10 mg Oral BID PRN Huy Santa MD   10 mg at 04/05/21 0003   • dextrose (D50W) 25 g/ 50mL Intravenous Solution 25 g  25 g Intravenous Q15 Min PRN Huy Santa MD       • dextrose (GLUTOSE) oral gel 15 g  15 g Oral Q15 Min PRN Huy Santa MD       • gabapentin (NEURONTIN) capsule 400 mg  400 mg Oral TID Umesh Giraldo III, MD   400 mg at 04/10/21 0846   • glucagon (human recombinant) (GLUCAGEN DIAGNOSTIC) injection 1 mg  1 mg Subcutaneous Q15 Min PRN Huy Santa MD       • heparin (porcine) 5000 UNIT/ML injection 5,000 Units  5,000 Units Subcutaneous Q8H Huy Santa MD   5,000 Units at 04/10/21 0619   • insulin aspart (novoLOG) injection 0-24 Units  0-24 Units Subcutaneous TID AC Haily Mcneil MD   24 Units at 04/10/21 1237   • insulin aspart (novoLOG) injection 20 Units  20 Units Subcutaneous TID With Meals Nirmal Calvillo MD   20 Units at 04/10/21 1238   • insulin detemir (LEVEMIR) injection 35 Units  35 Units Subcutaneous Q12H Nirmal Calvillo MD   35 Units at 04/10/21 0849   • ipratropium-albuterol (DUO-NEB) nebulizer solution 3 mL  3 mL Nebulization 4x Daily - RT Huy Santa MD   3 mL at 04/10/21 1119   • isosorbide mononitrate (IMDUR) 24 hr tablet 30 mg  30 mg Oral Daily Huy Santa MD   30 mg at 04/10/21 0846   • metoprolol tartrate (LOPRESSOR) tablet 100 mg  100 mg Oral Q12H Huy Santa MD   100 mg at 04/10/21 0846   • montelukast (SINGULAIR) tablet 10 mg  10 mg Oral Nightly Huy Santa MD   10 mg at 04/09/21 2039   • nitroglycerin (NITROSTAT) SL tablet 0.4 mg  0.4 mg Sublingual PRN Huy Santa MD       • nystatin (MYCOSTATIN) powder   Topical TID  Huy Santa MD   Given at 04/10/21 0847   • ondansetron ODT (ZOFRAN-ODT) disintegrating tablet 4 mg  4 mg Oral Q8H PRN Huy Santa MD   4 mg at 04/08/21 2243   • oxybutynin XL (DITROPAN-XL) 24 hr tablet 5 mg  5 mg Oral Daily Huy Santa MD   5 mg at 04/10/21 0846   • pantoprazole (PROTONIX) EC tablet 40 mg  40 mg Oral Nightly Huy Santa MD   40 mg at 04/09/21 2041   • potassium chloride (MICRO-K) CR capsule 10 mEq  10 mEq Oral Daily uHy Santa MD   10 mEq at 04/10/21 0846   • promethazine (PHENERGAN) tablet 25 mg  25 mg Oral Q6H PRN Huy Santa MD       • sodium bicarbonate tablet 1,300 mg  1,300 mg Oral BID Janice Chavez APRN   1,300 mg at 04/10/21 0846   • sodium chloride 0.9 % flush 10 mL  10 mL Intravenous PRN Huy Santa MD       • sodium chloride 0.9 % flush 10 mL  10 mL Intravenous PRN Huy Santa MD       • sodium chloride 0.9 % flush 10 mL  10 mL Intravenous Q12H Huy Santa MD   10 mL at 04/10/21 0853   • sodium chloride 0.9 % flush 10 mL  10 mL Intravenous PRN Huy Santa MD       • sodium chloride 0.9 % flush 10 mL  10 mL Intravenous Q12H Huy Santa MD   10 mL at 04/10/21 0853   • sodium chloride 0.9 % flush 10 mL  10 mL Intravenous Q12H Huy Santa MD   10 mL at 04/09/21 2042   • sodium chloride 0.9 % flush 10 mL  10 mL Intravenous PRN Huy Santa MD       • traMADol (ULTRAM) tablet 50 mg  50 mg Oral Q8H PRN Nirmal Calvillo MD   50 mg at 04/10/21 0708       Objective     Review of Systems:   Review of Systems    Physical Exam:   Temp:  [97 °F (36.1 °C)-97.9 °F (36.6 °C)] 97.4 °F (36.3 °C)  Heart Rate:  [69-76] 76  Resp:  [18-22] 18  BP: (140-179)/(72-81) 154/78     Physical Exam:  General Appearance:    Alert, cooperative, in no acute distress   Head:    Normocephalic, without obvious abnormality, atraumatic   Eyes:            Lids and lashes normal, conjunctivae and sclerae normal, no   icterus, no pallor, corneas clear, PERRLA   Ears:     Ears appear intact with no abnormalities noted   Throat:   No oral lesions, no thrush, oral mucosa moist   Neck:   No adenopathy, supple, trachea midline, no thyromegaly, no     carotid bruit, no JVD   Back:     No kyphosis present, no scoliosis present, no skin lesions,       erythema or scars, no tenderness to percussion or                   palpation,   range of motion normal   Lungs:     Clear to auscultation,respirations regular, even and                   unlabored    Heart:    Regular rhythm and normal rate, normal S1 and S2, no            murmur, no gallop, no rub, no click   Breast Exam:    Deferred   Abdomen:     Normal bowel sounds, no masses, no organomegaly, soft        nontender, nondistended, no guarding, no rebound                 tenderness   Genitalia:    Deferred   Extremities:   Moves all extremities well, no edema, no cyanosis, no              redness   Pulses:   Pulses palpable and equal bilaterally   Skin:   No bleeding, bruising or rash   Lymph nodes:   No palpable adenopathy   Neurologic:   Cranial nerves 2 - 12 grossly intact, sensation intact, DTR        present and equal bilaterally      Results Review:     Lab Results (last 24 hours)     Procedure Component Value Units Date/Time    POC Glucose Once [[id number]]  (Abnormal) Collected: 04/10/21 0651    Specimen: Blood Updated: 04/10/21 0707     Glucose 246 mg/dL      Comment: Result Not ConfirmedOperator: [id number] [place] ELRITAMeter ID: [id number]       Comprehensive Metabolic Panel [[id number]]  (Abnormal) Collected: 04/10/21 0613    Specimen: Blood Updated: 04/10/21 0700     Glucose 256 mg/dL      BUN 30 mg/dL      Creatinine 1.77 mg/dL      Sodium 135 mmol/L      Potassium 4.9 mmol/L      Chloride 104 mmol/L      CO2 22.0 mmol/L      Calcium 9.2 mg/dL      Total Protein 7.4 g/dL      Albumin 2.70 g/dL      ALT (SGPT) 14 U/L      AST (SGOT) 21 U/L      Alkaline Phosphatase 210 U/L      Total Bilirubin 0.2 mg/dL      eGFR Non   Amer 29 mL/min/1.73      Globulin 4.7 gm/dL      A/G Ratio 0.6 g/dL      BUN/Creatinine Ratio 16.9     Anion Gap 9.0 mmol/L     Narrative:      GFR Normal >60  Chronic Kidney Disease <60  Kidney Failure <15      CBC & Differential [[id number]]  (Abnormal) Collected: 04/10/21 0613    Specimen: Blood Updated: 04/10/21 0634    Narrative:      The following orders were created for panel order CBC & Differential.  Procedure                               Abnormality         Status                     ---------                               -----------         ------                     CBC Auto Differential[[id number]]        Abnormal            Final result                 Please view results for these tests on the individual orders.    CBC Auto Differential [[id number]]  (Abnormal) Collected: 04/10/21 0613    Specimen: Blood Updated: 04/10/21 0634     WBC 9.77 10*3/mm3      RBC 3.41 10*6/mm3      Hemoglobin 9.7 g/dL      Hematocrit 30.8 %      MCV 90.3 fL      MCH 28.4 pg      MCHC 31.5 g/dL      RDW 15.8 %      RDW-SD 51.4 fl      MPV 8.9 fL      Platelets 342 10*3/mm3      Neutrophil % 59.7 %      Lymphocyte % 29.2 %      Monocyte % 5.4 %      Eosinophil % 3.0 %      Basophil % 0.8 %      Immature Grans % 1.9 %      Neutrophils, Absolute 5.83 10*3/mm3      Lymphocytes, Absolute 2.85 10*3/mm3      Monocytes, Absolute 0.53 10*3/mm3      Eosinophils, Absolute 0.29 10*3/mm3      Basophils, Absolute 0.08 10*3/mm3      Immature Grans, Absolute 0.19 10*3/mm3      nRBC 0.0 /100 WBC     POC Glucose Once [[id number]]  (Abnormal) Collected: 04/09/21 2011    Specimen: Blood Updated: 04/09/21 2027     Glucose 251 mg/dL      Comment: Result Not ConfirmedOperator: [id number] [place] ELRITAMeter ID: [id number]              I reviewed the patient's new clinical results.  I reviewed the patient's new imaging results and agree with the interpretation.     ASSESSMENT/PLAN:   ASSESSMENT:  1.  Dysphagia and chest pain, rule out stricture,  ring and CA.  Could also be due to dysmotility.  2.  GERD, well controlled with PPI.  3.  Acute renal insufficiency, improving.  4.  Change in mental status, improving.  5.  Hyponatremia, improving.    PLAN:  1.  Continue PPI  2.  Soft diet  3.  EGD on Monday for possible dilatation.  4.  Continue p.o. fluid restriction  5.  Continue current supportive care  The risks, benefits, and alternatives of this procedure have been discussed with the patient or the responsible party- the patient understands and agrees to proceed.         Mingo Duarte MD  04/10/21  13:40 CDT

## 2021-04-10 NOTE — PLAN OF CARE
Goal Outcome Evaluation:  Plan of Care Reviewed With: caregiver, spouse, patient  Progress: no change  Outcome Summary: Excellent intake.  GI consulted and EGD planned for Mondayfor possible dilation related to dysphagia.  Pt declines the need for diet consistency change.  Monitor.

## 2021-04-10 NOTE — PLAN OF CARE
Problem: Adult Inpatient Plan of Care  Goal: Plan of Care Review  4/10/2021 1512 by Yamilex Jenkins OTA  Outcome: Ongoing, Progressing  Flowsheets (Taken 4/10/2021 1457)  Outcome Summary: Pt agreed to tx. Pt deferred ADL's having already bathed. Pt performed RUE there ex with 1 lb. HW 1x20 in all planes and LUE there ex with 3 lb. HW 2x20 in all planes while sitting in bedside chair. Pt left in chair with all needs met and within reach.   Goal Outcome Evaluation:

## 2021-04-10 NOTE — PLAN OF CARE
Goal Outcome Evaluation:        Outcome Summary: Pt sleeping in bedside recliner; vital signs stable; monitoring pt

## 2021-04-11 PROBLEM — R74.8 ALKALINE PHOSPHATASE ELEVATION: Status: ACTIVE | Noted: 2021-04-11

## 2021-04-11 LAB
ALBUMIN SERPL-MCNC: 2.8 G/DL (ref 3.5–5.2)
ALBUMIN/GLOB SERPL: 0.5 G/DL
ALP SERPL-CCNC: 199 U/L (ref 39–117)
ALT SERPL W P-5'-P-CCNC: 15 U/L (ref 1–33)
ANION GAP SERPL CALCULATED.3IONS-SCNC: 13 MMOL/L (ref 5–15)
AST SERPL-CCNC: 29 U/L (ref 1–32)
BASOPHILS # BLD AUTO: 0.13 10*3/MM3 (ref 0–0.2)
BASOPHILS NFR BLD AUTO: 1 % (ref 0–1.5)
BILIRUB SERPL-MCNC: 0.2 MG/DL (ref 0–1.2)
BUN SERPL-MCNC: 28 MG/DL (ref 8–23)
BUN/CREAT SERPL: 17.3 (ref 7–25)
CALCIUM SPEC-SCNC: 9.5 MG/DL (ref 8.6–10.5)
CHLORIDE SERPL-SCNC: 106 MMOL/L (ref 98–107)
CO2 SERPL-SCNC: 21 MMOL/L (ref 22–29)
CREAT SERPL-MCNC: 1.62 MG/DL (ref 0.57–1)
DEPRECATED RDW RBC AUTO: 50.8 FL (ref 37–54)
EOSINOPHIL # BLD AUTO: 0.44 10*3/MM3 (ref 0–0.4)
EOSINOPHIL NFR BLD AUTO: 3.3 % (ref 0.3–6.2)
ERYTHROCYTE [DISTWIDTH] IN BLOOD BY AUTOMATED COUNT: 15.7 % (ref 12.3–15.4)
GFR SERPL CREATININE-BSD FRML MDRD: 32 ML/MIN/1.73
GGT SERPL-CCNC: 122 U/L (ref 5–36)
GLOBULIN UR ELPH-MCNC: 5.4 GM/DL
GLUCOSE BLDC GLUCOMTR-MCNC: 216 MG/DL (ref 70–130)
GLUCOSE BLDC GLUCOMTR-MCNC: 238 MG/DL (ref 70–130)
GLUCOSE BLDC GLUCOMTR-MCNC: 67 MG/DL (ref 70–130)
GLUCOSE SERPL-MCNC: 67 MG/DL (ref 65–99)
HCT VFR BLD AUTO: 33.8 % (ref 34–46.6)
HGB BLD-MCNC: 10.8 G/DL (ref 12–15.9)
IMM GRANULOCYTES # BLD AUTO: 0.18 10*3/MM3 (ref 0–0.05)
IMM GRANULOCYTES NFR BLD AUTO: 1.3 % (ref 0–0.5)
LYMPHOCYTES # BLD AUTO: 4.26 10*3/MM3 (ref 0.7–3.1)
LYMPHOCYTES NFR BLD AUTO: 31.5 % (ref 19.6–45.3)
MCH RBC QN AUTO: 28.6 PG (ref 26.6–33)
MCHC RBC AUTO-ENTMCNC: 32 G/DL (ref 31.5–35.7)
MCV RBC AUTO: 89.4 FL (ref 79–97)
MONOCYTES # BLD AUTO: 0.86 10*3/MM3 (ref 0.1–0.9)
MONOCYTES NFR BLD AUTO: 6.4 % (ref 5–12)
NEUTROPHILS NFR BLD AUTO: 56.5 % (ref 42.7–76)
NEUTROPHILS NFR BLD AUTO: 7.64 10*3/MM3 (ref 1.7–7)
NRBC BLD AUTO-RTO: 0 /100 WBC (ref 0–0.2)
PLATELET # BLD AUTO: 404 10*3/MM3 (ref 140–450)
PMV BLD AUTO: 8.9 FL (ref 6–12)
POTASSIUM SERPL-SCNC: 4.8 MMOL/L (ref 3.5–5.2)
PROT SERPL-MCNC: 8.2 G/DL (ref 6–8.5)
RBC # BLD AUTO: 3.78 10*6/MM3 (ref 3.77–5.28)
SODIUM SERPL-SCNC: 140 MMOL/L (ref 136–145)
WBC # BLD AUTO: 13.51 10*3/MM3 (ref 3.4–10.8)
WHOLE BLOOD HOLD SPECIMEN: NORMAL

## 2021-04-11 PROCEDURE — 25010000002 HEPARIN (PORCINE) PER 1000 UNITS: Performed by: CHIROPRACTOR

## 2021-04-11 PROCEDURE — 63710000001 INSULIN DETEMIR PER 5 UNITS: Performed by: STUDENT IN AN ORGANIZED HEALTH CARE EDUCATION/TRAINING PROGRAM

## 2021-04-11 PROCEDURE — 63710000001 INSULIN ASPART PER 5 UNITS: Performed by: STUDENT IN AN ORGANIZED HEALTH CARE EDUCATION/TRAINING PROGRAM

## 2021-04-11 PROCEDURE — 97110 THERAPEUTIC EXERCISES: CPT

## 2021-04-11 PROCEDURE — 85025 COMPLETE CBC W/AUTO DIFF WBC: CPT | Performed by: CHIROPRACTOR

## 2021-04-11 PROCEDURE — 97535 SELF CARE MNGMENT TRAINING: CPT

## 2021-04-11 PROCEDURE — 82977 ASSAY OF GGT: CPT | Performed by: CHIROPRACTOR

## 2021-04-11 PROCEDURE — 94799 UNLISTED PULMONARY SVC/PX: CPT

## 2021-04-11 PROCEDURE — 80053 COMPREHEN METABOLIC PANEL: CPT | Performed by: CHIROPRACTOR

## 2021-04-11 PROCEDURE — 82962 GLUCOSE BLOOD TEST: CPT

## 2021-04-11 PROCEDURE — 99233 SBSQ HOSP IP/OBS HIGH 50: CPT | Performed by: CHIROPRACTOR

## 2021-04-11 PROCEDURE — 87635 SARS-COV-2 COVID-19 AMP PRB: CPT | Performed by: CHIROPRACTOR

## 2021-04-11 PROCEDURE — 99232 SBSQ HOSP IP/OBS MODERATE 35: CPT | Performed by: INTERNAL MEDICINE

## 2021-04-11 PROCEDURE — 97116 GAIT TRAINING THERAPY: CPT

## 2021-04-11 RX ADMIN — CALCIUM CARBONATE (ANTACID) CHEW TAB 500 MG 2 TABLET: 500 CHEW TAB at 06:38

## 2021-04-11 RX ADMIN — CLINDAMYCIN HYDROCHLORIDE 600 MG: 150 CAPSULE ORAL at 20:40

## 2021-04-11 RX ADMIN — TRAMADOL HYDROCHLORIDE 50 MG: 50 TABLET, FILM COATED ORAL at 06:38

## 2021-04-11 RX ADMIN — TRAMADOL HYDROCHLORIDE 50 MG: 50 TABLET, FILM COATED ORAL at 15:51

## 2021-04-11 RX ADMIN — SODIUM BICARBONATE 1300 MG: 650 TABLET ORAL at 08:09

## 2021-04-11 RX ADMIN — SODIUM CHLORIDE, PRESERVATIVE FREE 10 ML: 5 INJECTION INTRAVENOUS at 08:11

## 2021-04-11 RX ADMIN — INSULIN DETEMIR 35 UNITS: 100 INJECTION, SOLUTION SUBCUTANEOUS at 09:00

## 2021-04-11 RX ADMIN — GABAPENTIN 400 MG: 400 CAPSULE ORAL at 20:40

## 2021-04-11 RX ADMIN — SODIUM BICARBONATE 1300 MG: 650 TABLET ORAL at 20:40

## 2021-04-11 RX ADMIN — NYSTATIN: 100000 POWDER TOPICAL at 08:11

## 2021-04-11 RX ADMIN — IPRATROPIUM BROMIDE AND ALBUTEROL SULFATE 3 ML: 2.5; .5 SOLUTION RESPIRATORY (INHALATION) at 23:17

## 2021-04-11 RX ADMIN — IPRATROPIUM BROMIDE AND ALBUTEROL SULFATE 3 ML: 2.5; .5 SOLUTION RESPIRATORY (INHALATION) at 11:45

## 2021-04-11 RX ADMIN — MONTELUKAST SODIUM 10 MG: 10 TABLET, COATED ORAL at 20:40

## 2021-04-11 RX ADMIN — INSULIN ASPART 20 UNITS: 100 INJECTION, SOLUTION INTRAVENOUS; SUBCUTANEOUS at 08:09

## 2021-04-11 RX ADMIN — INSULIN ASPART 20 UNITS: 100 INJECTION, SOLUTION INTRAVENOUS; SUBCUTANEOUS at 12:00

## 2021-04-11 RX ADMIN — IPRATROPIUM BROMIDE AND ALBUTEROL SULFATE 3 ML: 2.5; .5 SOLUTION RESPIRATORY (INHALATION) at 07:56

## 2021-04-11 RX ADMIN — GABAPENTIN 400 MG: 400 CAPSULE ORAL at 15:30

## 2021-04-11 RX ADMIN — HEPARIN SODIUM 5000 UNITS: 5000 INJECTION INTRAVENOUS; SUBCUTANEOUS at 05:40

## 2021-04-11 RX ADMIN — INSULIN ASPART 20 UNITS: 100 INJECTION, SOLUTION INTRAVENOUS; SUBCUTANEOUS at 17:59

## 2021-04-11 RX ADMIN — CLINDAMYCIN HYDROCHLORIDE 600 MG: 150 CAPSULE ORAL at 05:40

## 2021-04-11 RX ADMIN — GABAPENTIN 400 MG: 400 CAPSULE ORAL at 08:09

## 2021-04-11 RX ADMIN — CLOPIDOGREL BISULFATE 75 MG: 75 TABLET ORAL at 08:10

## 2021-04-11 RX ADMIN — ATORVASTATIN CALCIUM 40 MG: 40 TABLET, FILM COATED ORAL at 08:10

## 2021-04-11 RX ADMIN — INSULIN ASPART 8 UNITS: 100 INJECTION, SOLUTION INTRAVENOUS; SUBCUTANEOUS at 12:00

## 2021-04-11 RX ADMIN — METOPROLOL TARTRATE 100 MG: 50 TABLET, FILM COATED ORAL at 20:40

## 2021-04-11 RX ADMIN — CLINDAMYCIN HYDROCHLORIDE 600 MG: 150 CAPSULE ORAL at 15:29

## 2021-04-11 RX ADMIN — POTASSIUM CHLORIDE 10 MEQ: 750 CAPSULE, EXTENDED RELEASE ORAL at 08:10

## 2021-04-11 RX ADMIN — OXYBUTYNIN CHLORIDE 5 MG: 5 TABLET, EXTENDED RELEASE ORAL at 08:10

## 2021-04-11 RX ADMIN — HEPARIN SODIUM 5000 UNITS: 5000 INJECTION INTRAVENOUS; SUBCUTANEOUS at 20:40

## 2021-04-11 RX ADMIN — HEPARIN SODIUM 5000 UNITS: 5000 INJECTION INTRAVENOUS; SUBCUTANEOUS at 15:29

## 2021-04-11 RX ADMIN — PANTOPRAZOLE SODIUM 40 MG: 40 TABLET, DELAYED RELEASE ORAL at 20:40

## 2021-04-11 RX ADMIN — INSULIN ASPART 8 UNITS: 100 INJECTION, SOLUTION INTRAVENOUS; SUBCUTANEOUS at 17:58

## 2021-04-11 RX ADMIN — ISOSORBIDE MONONITRATE 30 MG: 30 TABLET, EXTENDED RELEASE ORAL at 08:20

## 2021-04-11 RX ADMIN — METOPROLOL TARTRATE 100 MG: 50 TABLET, FILM COATED ORAL at 08:19

## 2021-04-11 RX ADMIN — INSULIN DETEMIR 30 UNITS: 100 INJECTION, SOLUTION SUBCUTANEOUS at 20:41

## 2021-04-11 NOTE — THERAPY TREATMENT NOTE
Patient Name: Yamileth Slater  : 1960    MRN: [id number]                              Today's Date: 2021       Admit Date: 3/31/2021    Visit Dx:     ICD-10-CM ICD-9-CM   1. Hyperglycemia  R73.9 790.29   2. Urinary tract infection in female  N39.0 599.0   3. Impaired mobility and ADLs  Z74.09 V49.89    Z78.9    4. Impaired functional mobility, balance, gait, and endurance  Z74.09 V49.89     Patient Active Problem List   Diagnosis   • Type 2 diabetes mellitus with diabetic polyneuropathy, with long-term current use of insulin (CMS/[facility])   • Chronic obstructive pulmonary disease (CMS/[facility])   • Chronic midline low back pain without sciatica   • Vitamin D deficiency   • Type 2 diabetes mellitus (CMS/[facility])   • Tobacco dependence syndrome   • Surgical follow-up care   • Shoulder joint pain   • Peripheral vascular disease (CMS/[facility])   • Pain   • Neurologic disorder associated with diabetes mellitus (CMS/[facility])   • Morbid obesity with BMI of 50.0-59.9, adult (CMS/[facility])   • Mixed hyperlipidemia   • Kidney stone   • History of colon polyps   • GERD (gastroesophageal reflux disease)   • Excoriated eczema   • Essential hypertension   • Encounter for medication refill   • Dyslipidemia   • Diabetes mellitus (CMS/[facility])   • Coronary artery disease   • Chronic obstructive lung disease (CMS/[facility])   • Chronic folliculitis   • Chest pain   • Mild persistent asthma   • Anxiety states   • 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   • Uncontrolled type 2 diabetes mellitus with complication, with long-term current use of insulin (CMS/[facility])   • Acute renal failure superimposed on chronic kidney disease (CMS/[facility])   • Anemia   • Hypothyroidism   • Carotid artery disease (CMS/[facility])   • Current smoker   • DM type 2 with diabetic peripheral neuropathy (CMS/[facility])   • [place] abuse   • PAD (peripheral artery disease) (CMS/[facility])   • Pneumonia of both lungs due to infectious organism   • S/P CABG x 3   • Intercostal pain   •  Venous insufficiency   • Dyspnea on exertion   • LAFB (left anterior fascicular block)   • Pulmonary hypertension (CMS/[facility])   • Left hip pain   • Neck pain   • Stage 3b chronic kidney disease (CMS/[facility])   • MIKE and COPD overlap syndrome (CMS/[facility])   • Pneumonia due to COVID-19 virus   • CKD (chronic kidney disease) stage 4, GFR 15-29 ml/min (CMS/[facility])   • Morbid obesity (CMS/[facility])   • Type 2 diabetes mellitus with hyperglycemia, with long-term current use of insulin (CMS/[facility])   • Hyponatremia   • Anemia   • Cutaneous candidiasis   • Community acquired pneumonia of right middle lobe of lung   • MRSA infection   • Alkaline phosphatase elevation     Past Medical History:   Diagnosis Date   • Anxiety    • Carotid artery stenosis    • Chronic obstructive lung disease (CMS/[facility])    • CKD (chronic kidney disease) stage 4, GFR 15-29 ml/min (CMS/[facility])    • Colonic polyp    • Coronary arteriosclerosis    • Diabetes mellitus (CMS/[facility])    • Diabetic neuropathy (CMS/[facility])    • GERD (gastroesophageal reflux disease)    • Hypercholesterolemia    • Hypertension    • Morbid obesity (CMS/[facility])    • Nephrolithiasis    • Peripheral vascular disease (CMS/[facility])    • Sleep apnea    • Substance abuse (CMS/[facility])    • Vitamin D deficiency      Past Surgical History:   Procedure Laterality Date   • CARDIAC CATHETERIZATION N/A 7/14/2020   • CAROTID STENT Left    • COLONOSCOPY     • CORONARY ARTERY BYPASS GRAFT     • CYSTOSCOPY BLADDER STONE LITHOTRIPSY Bilateral      General Information     Row Name 04/11/21 0932          OT Time and Intention    Document Type  therapy note (daily note)  -TO     Mode of Treatment  individual therapy;occupational therapy  -TO     Row Name 04/11/21 0932          General Information    Patient Profile Reviewed  yes  -TO     Existing Precautions/Restrictions  cardiac;fall  -TO     Row Name 04/11/21 0932          Cognition    Orientation Status (Cognition)  oriented x 4  -TO     Row Name 04/11/21 0932          Safety Issues,  Functional Mobility    Impairments Affecting Function (Mobility)  cognition;balance;coordination;endurance/activity tolerance;sensation/sensory awareness;strength;motor control;motor planning;pain;range of motion (ROM);postural/trunk control  -TO       User Key  (r) = Recorded By, (t) = Taken By, (c) = Cosigned By    Initials Name Provider Type    TO Rashel Peralta COTA/L Occupational Therapy Assistant          Mobility/ADL's     [facility] 04/11/21 0932          Transfers    Transfers  toilet transfer;sit-stand transfer  -TO     Sit-Stand [place] (Transfers)  standby assist  -TO     [place] Level (Toilet Transfer)  contact guard declined use of RW with some unsteadiness with turns noted  -TO     Assistive Device (Toilet Transfer)  grab bars/safety frame  -TO     Row Name 04/11/21 0932          Sit-Stand Transfer    Assistive Device (Sit-Stand Transfers)  walker, rolling platform  -TO     Row Name 04/11/21 0932          Toilet Transfer    Type (Toilet Transfer)  sit-stand;stand-sit  -TO     Row Name 04/11/21 09          Functional Mobility    Functional Mobility- Ind. Level  nonverbal cues required (demo/gesture);verbal cues required;contact guard assist  -TO     Functional Mobility-Distance (Feet)  12  -TO     Functional Mobility- Safety Issues  balance decreased during turns  -TO     Row Name 04/11/21 09          Activities of Daily Living    BADL Assessment/Intervention  toileting  -TO     Row Name 04/11/21 09          Mobility    Extremity Weight-bearing Status  right upper extremity  -TO     Right Upper Extremity (Weight-bearing Status)  -- platform attached R to walker ;  -TO     Row Name 04/11/21 09          Toileting Assessment/Training    [place] Level (Toileting)  toileting skills;dependent (less than 25% patient effort)  -TO     Row Name 04/11/21 09          Grooming Assessment/Training    Position (Grooming)  -- sitting in chair  -TO       User Key  (r) = Recorded By, (t) = Taken  By, (c) = Cosigned By    Initials Name Provider Type    TO Rashel Peralta COTA/L Occupational Therapy Assistant        Obj/Interventions     Row Name 04/11/21 0932          Vision Assessment/Intervention    Visual Impairment/Limitations  corrective lenses for reading hearing WFL  -TO     Row Name 04/11/21 0932          Shoulder (Therapeutic Exercise)    Shoulder Strengthening (Therapeutic Exercise)  bilateral;flexion;extension;horizontal aBduction/aDduction;external rotation;internal rotation;scapular stabilization;sitting;3 lb free weight;other (see comments);2 sets 2x15;AG with some R UE 2* to pain  -TO     Row Name 04/11/21 0932          Elbow/Forearm (Therapeutic Exercise)    Elbow/Forearm (Therapeutic Exercise)  strengthening exercise  -TO     Row Name 04/11/21 0932          Balance    Static Standing Balance  mild impairment x 4 mins  -TO       User Key  (r) = Recorded By, (t) = Taken By, (c) = Cosigned By    Initials Name Provider Type    TO Rashel Peralta COTA/L Occupational Therapy Assistant        Goals/Plan     Row Name 04/11/21 0932          Transfer Goal 1 (OT)    Activity/Assistive Device (Transfer Goal 1, OT)  toilet;commode, bedside with drop arms;commode, bedside without drop arms  -TO     [place] Level/Cues Needed (Transfer Goal 1, OT)  minimum assist (75% or more patient effort)  -TO     Time Frame (Transfer Goal 1, OT)  long term goal (LTG);by discharge  -TO     Progress/Outcome (Transfer Goal 1, OT)  goal met  -TO     Row Name 04/11/21 0932          Bathing Goal 1 (OT)    Activity/Device (Bathing Goal 1, OT)  bathing skills, all  -TO     [place] Level/Cues Needed (Bathing Goal 1, OT)  set-up required;standby assist;verbal cues required  -TO     Time Frame (Bathing Goal 1, OT)  long term goal (LTG);by discharge  -TO     Progress/Outcomes (Bathing Goal 1, OT)  goal not met  -TO     Row Name 04/11/21 0932          Dressing Goal 1 (OT)    Activity/Device (Dressing Goal 1, OT)  dressing  skills, all  -TO     [place]/Cues Needed (Dressing Goal 1, OT)  set-up required;standby assist;verbal cues required  -TO     Time Frame (Dressing Goal 1, OT)  long term goal (LTG);by discharge  -TO     Progress/Outcome (Dressing Goal 1, OT)  goal not met  -TO       User Key  (r) = Recorded By, (t) = Taken By, (c) = Cosigned By    Initials Name Provider Type    TO Rashel Peralta COTA/L Occupational Therapy Assistant        Clinical Impression     Row Name 04/11/21 0932          Pain Scale: Numbers Pre/Post-Treatment    Pretreatment Pain Rating  8/10  -TO     Posttreatment Pain Rating  8/10  -TO     Row Name 04/11/21 0932          Plan of Care Review    Progress  improving  -TO     Outcome Summary  Pt up in chair nitially, declining bath. pt performed B UE ther ex 3 lb dowel L UE and as AG with R UE when pain limited. Pt performed fx'al mobility chair<>toilet CGA sans AD with pt edu on improved safety with RW. Pt perfomed static standing 4 mins with 0 LOB.  -TO     Row Name 04/11/21 0932          Therapy Assessment/Plan (OT)    Rehab Potential (OT)  fair, will monitor progress closely  -TO     Criteria for Skilled Therapeutic Interventions Met (OT)  yes;meets criteria  -TO     Therapy Frequency (OT)  other (see comments) 5-7 days a week  -TO     Row Name 04/11/21 0932          Therapy Plan Review/Discharge Plan (OT)    Anticipated Discharge Disposition (OT)  inpatient rehabilitation facility;skilled nursing facility;home with 24/7 care;home with home health  -TO     Row Name 04/11/21 0932          Positioning and Restraints    Pre-Treatment Position  sitting in chair/recliner  -TO     Post Treatment Position  chair  -TO     In Chair  encouraged to call for assist;with family/caregiver  -TO       User Key  (r) = Recorded By, (t) = Taken By, (c) = Cosigned By    Initials Name Provider Type    TO Rashel Peralta COTA/L Occupational Therapy Assistant        Outcome Measures     Row Name 04/11/21 1320          How  much help from another is currently needed...    Putting on and taking off regular lower body clothing?  2  -TO     Bathing (including washing, rinsing, and drying)  3  -TO     Toileting (which includes using toilet bed pan or urinal)  2  -TO     Putting on and taking off regular upper body clothing  3  -TO     Taking care of personal grooming (such as brushing teeth)  3  -TO     Eating meals  4  -TO     AM-PAC 6 Clicks Score (OT)  17  -TO       User Key  (r) = Recorded By, (t) = Taken By, (c) = Cosigned By    Initials Name Provider Type    TO Rashel Peralta COTA/L Occupational Therapy Assistant        Occupational Therapy Education                 Title: PT OT SLP Therapies (In Progress)     Topic: Occupational Therapy (In Progress)     Point: ADL training (In Progress)     Description:   Instruct learner(s) on proper safety adaptation and remediation techniques during self care or transfers.   Instruct in proper use of assistive devices.              Learning Progress Summary           Patient Acceptance, E, NR by  at 4/9/2021 1456    Acceptance, E, NR by  at 4/7/2021 1230    Comment: Educated about OT and POC. Educated to call for assist. Educated on safety throughout.    Acceptance, E, NR by  at 4/4/2021 1309    Acceptance, E, NR by  at 4/2/2021 1223    Comment: Educated about OT and POC. Educated on safety throughout, educated on precuations with right wrist. educated to call for assist.   Family Acceptance, E, NR by  at 4/2/2021 1223    Comment: Educated about OT and POC. Educated on safety throughout, educated on precuations with right wrist. educated to call for assist.                   Point: Home exercise program (Done)     Description:   Instruct learner(s) on appropriate technique for monitoring, assisting and/or progressing therapeutic exercises/activities.              Learning Progress Summary           Patient Acceptance, E, VU by  at 4/6/2021 1450                   Point: Precautions  (In Progress)     Description:   Instruct learner(s) on prescribed precautions during self-care and functional transfers.              Learning Progress Summary           Patient Acceptance, E, NR by  at 4/7/2021 1230    Comment: Educated about OT and POC. Educated to call for assist. Educated on safety throughout.    Acceptance, E, NR by  at 4/2/2021 1223    Comment: Educated about OT and POC. Educated on safety throughout, educated on precuations with right wrist. educated to call for assist.   Family Acceptance, E, NR by  at 4/2/2021 1223    Comment: Educated about OT and POC. Educated on safety throughout, educated on precuations with right wrist. educated to call for assist.                   Point: Body mechanics (Done)     Description:   Instruct learner(s) on proper positioning and spine alignment during self-care, functional mobility activities and/or exercises.              Learning Progress Summary           Patient Acceptance, E, VU,DU by  at 4/10/2021 1456    Comment: Pt edu on OT and POC. Pt edu on proper body mechanics when performing BUE ther ex.    Acceptance, E, NR by  at 4/9/2021 1456                               User Key     Initials Effective Dates Name Provider Type Discipline     06/08/18 -  Mary Jo Michel, OTR/L Occupational Therapist OT     03/07/18 -  Nissa Bey COTA/L Occupational Therapy Assistant OT     11/05/19 -  Yamilex Jnekins OTA Occupational Therapy Assistant OT              OT Recommendation and Plan  Therapy Frequency (OT): other (see comments) (5-7 days a week)  Plan of Care Review  Progress: improving  Outcome Summary: Pt up in chair nitially, declining bath. pt performed B UE ther ex 3 lb dowel L UE and as AG with R UE when pain limited. Pt performed fx'al mobility chair<>toilet CGA sans AD with pt edu on improved safety with RW. Pt perfomed static standing 4 mins with 0 LOB.     Time Calculation:   Time Calculation- OT     Row Name 04/11/21 1328              Time Calculation- OT    OT Start Time  0932  -TO      OT Stop Time  1000  -TO      OT Time Calculation (min)  28 min  -TO      Total Timed Code Minutes- OT  28 minute(s)  -TO      OT Received On  04/11/21  -TO        User Key  (r) = Recorded By, (t) = Taken By, (c) = Cosigned By    Initials Name Provider Type    TO Rashel Peralta OLMSTEAD/L Occupational Therapy Assistant        Therapy Charges for Today     Code Description Service Date Service Provider Modifiers Qty    28591772342 HC OT SELF CARE/MGMT/TRAIN EA 15 MIN 4/11/2021 Rashel Peralta OLMSTEAD/L GO 1    70235139133 HC OT THER PROC EA 15 MIN 4/11/2021 Rashel Peralta OLMSTEAD/L GO 1               Rashel Peralta OLMSTEAD/L  4/11/2021

## 2021-04-11 NOTE — PROGRESS NOTES
FAMILY MEDICINE DAILY PROGRESS NOTE    NAME: Yamileth Slater  : 1960  MRN: [id number]      LOS: 10 days     PROVIDER OF SERVICE: Huy Santa MD    Chief Complaint: Type 2 diabetes mellitus with hyperglycemia, with long-term current use of insulin (CMS/[facility])    Subjective:     Interval History:  History taken from: patient chart RN    Patient was awake and alert this morning upon my examination.  She was laying comfortably in bed.  She reports pain in the area of the esophagus when she attempts to eat.  The pain at rest is 0/10 but with any attempt to eat increases to 10/10.  Her nurse reported that last evening she attempted to order a cheeseburger and French fries from .  She is supposed to be on a restriction of less than 45 g of carbohydrates per meal.  Vital signs are stable.  Glucose this morning was 67.  She is not requiring any supplemental oxygen.    Review of Systems:   Review of Systems   Constitutional: Negative for chills and fever.   HENT: Positive for trouble swallowing.    Eyes: Negative for visual disturbance.   Respiratory: Negative for cough, chest tightness and shortness of breath.    Cardiovascular: Negative for chest pain and palpitations.   Gastrointestinal: Positive for abdominal pain. Negative for constipation, diarrhea and nausea.        Pain is in the upper abdominal region midline and extends into the chest following the course of the esophagus.   Neurological: Negative for tremors.       Objective:     Vital Signs  Temp:  [97.4 °F (36.3 °C)-98.1 °F (36.7 °C)] 98.1 °F (36.7 °C)  Heart Rate:  [60-76] 67  Resp:  [18] 18  BP: (134-169)/(68-98) 169/98   Body mass index is 50.41 kg/m².    Physical Exam  Physical Exam  Constitutional:       Appearance: She is obese.   Eyes:      General: No scleral icterus.  Cardiovascular:      Rate and Rhythm: Normal rate and regular rhythm.      Pulses: Normal pulses.   Pulmonary:      Effort: Pulmonary effort is normal.       Breath sounds: Normal breath sounds.   Abdominal:      Tenderness: There is abdominal tenderness.   Musculoskeletal:      Right lower leg: No edema.      Left lower leg: No edema.   Skin:     Capillary Refill: Capillary refill takes 2 to 3 seconds.      Findings: No rash.   Neurological:      Mental Status: She is alert. Mental status is at baseline.         Scheduled Meds   atorvastatin, 40 mg, Oral, Daily  clindamycin, 600 mg, Oral, Q8H  clopidogrel, 75 mg, Oral, Daily  gabapentin, 400 mg, Oral, TID  heparin (porcine), 5,000 Units, Subcutaneous, Q8H  insulin aspart, 0-24 Units, Subcutaneous, TID AC  insulin aspart, 20 Units, Subcutaneous, TID With Meals  insulin detemir, 35 Units, Subcutaneous, Q12H  ipratropium-albuterol, 3 mL, Nebulization, 4x Daily - RT  isosorbide mononitrate, 30 mg, Oral, Daily  metoprolol tartrate, 100 mg, Oral, Q12H  montelukast, 10 mg, Oral, Nightly  nystatin, , Topical, TID  oxybutynin XL, 5 mg, Oral, Daily  pantoprazole, 40 mg, Oral, Nightly  potassium chloride, 10 mEq, Oral, Daily  sodium bicarbonate, 1,300 mg, Oral, BID  sodium chloride, 10 mL, Intravenous, Q12H  sodium chloride, 10 mL, Intravenous, Q12H  sodium chloride, 10 mL, Intravenous, Q12H       PRN Meds   •  acetaminophen  •  albuterol  •  calcium carbonate  •  cyclobenzaprine  •  dextrose  •  dextrose  •  glucagon (human recombinant)  •  nitroglycerin  •  ondansetron ODT  •  promethazine  •  sodium chloride  •  [COMPLETED] Insert peripheral IV **AND** sodium chloride  •  sodium chloride  •  sodium chloride  •  traMADol      Diagnostic Data    Results from last 7 days   Lab Units 04/11/21  0617   WBC 10*3/mm3 13.51*   HEMOGLOBIN g/dL 10.8*   HEMATOCRIT % 33.8*   PLATELETS 10*3/mm3 404   GLUCOSE mg/dL 67   CREATININE mg/dL 1.62*   BUN mg/dL 28*   SODIUM mmol/L 140   POTASSIUM mmol/L 4.8   AST (SGOT) U/L 29   ALT (SGPT) U/L 15   ALK PHOS U/L 199*   BILIRUBIN mg/dL 0.2   ANION GAP mmol/L 13.0       No radiology results for the  last day      I reviewed the patient's new clinical results.    Assessment/Plan:     Active Hospital Problems    Diagnosis  POA   • **Type 2 diabetes mellitus with hyperglycemia, with long-term current use of insulin (CMS/[facility]) [E11.65, Z79.4]  Not Applicable     Priority: High     -Glucose monitoring  -Levemir 35u bid, Aspart 20 units w/meals, ISS   +32 units SSI in last 24 hours  -Consistent carb diet limited to less than 45 g of carbohydrates per meal.  -Patient attempting to cheat on diet by ordering cheeseburger and fries from nutrition services.  Nurse aware     • Community acquired pneumonia of right middle lobe of lung [J18.9]  Yes     Priority: High     - Confirmed on CXR. CXR 4/5 unremarkable   -Aspiration pneumonitis now suspected.  Patient MRSA positive in nares.  -IV clindamycin 300 mg 3 times daily finish out 10-day course of antibiotic treatment. Started 4/3/21. Switched to Clindamycin 600mg q8h 4/5/21  -WBCs today 13.5 slight increase.  Patient afebrile.     • Acute renal failure superimposed on chronic kidney disease (CMS/[facility]) [N17.9, N18.9]  Yes     Priority: High     -Hold nephrotoxic drugs  -Nephrology consulted and appreciate recommendations  -Improving creatinine today 1.62  -Baseline: 1.8-2       • MRSA infection [A49.02]  Yes   • Cutaneous candidiasis [B37.2]  Yes     - Nystatin     • Hyponatremia [E87.1]  Yes     -Normal once corrected for hyperglycemia     • Anemia [D64.9]  Yes     -Iron studies indicative of mixed iron deficiency and chronic disease  -Continue home ferrous sulfate       • Chronic midline low back pain without sciatica [M54.5, G89.29]  Yes     - Tramadol PRN     • Chronic obstructive lung disease (CMS/[facility]) [J44.9]  Yes     -O2 supplementation  -Home inhalers as needed  -DuoNebs  -CPAP at night     • GERD (gastroesophageal reflux disease) [K21.9]  Yes     - IV protonix qday  - Tums     • Coronary artery disease [I25.10]  Yes     -Continue home meds     • Current smoker  [F17.200]  Yes     -Patch         DVT prophylaxis: Heparin  Code status is   Code Status and Medical Interventions:   Ordered at: 03/31/21 1811     Code Status:    CPR     Medical Interventions (Level of Support Prior to Arrest):    Full       Plan for disposition:Where: home      Time: More than 50% of time spent in counseling and coordination of care:  Total face-to-face/floor time 35 min.  Time spent in counseling 10 min. Counseling included the following topics: Importance of sticking to her carbohydrate limited diabetic diet in order to ensure good health in the future.         This document has been electronically signed by Huy Santa MD on April 11, 2021 09:01 CDT

## 2021-04-11 NOTE — THERAPY TREATMENT NOTE
Acute Care - Physical Therapy Treatment Note  [facility]     Patient Name: Yamileth Slater  : 1960  MRN: [id number]  Today's Date: 2021           PT Assessment (last 12 hours)      PT Evaluation and Treatment     Row Name 21 0847          Physical Therapy Time and Intention    Subjective Information  complains of;pain  -     Document Type  therapy note (daily note)  -     Mode of Treatment  individual therapy;physical therapy  -     Patient Effort  good  -     Row Name 21 0847          General Information    Patient Profile Reviewed  yes  -     Existing Precautions/Restrictions  cardiac;fall  -     Row Name 21 0847          Sensory    Hearing Status  WFL  -     Row Name 21 0847          Cognition    Orientation Status (Cognition)  oriented x 4  -[facility] Name 21 0847          Pain Scale: Numbers Pre/Post-Treatment    Pretreatment Pain Rating  10  -     Posttreatment Pain Rating  8/10  -     Pre/Posttreatment Pain Comment  R hip and chest  -[facility] Name 21 0847          Mobility    Extremity Weight-bearing Status  right upper extremity  -     Right Upper Extremity (Weight-bearing Status)  -- platform attached R to walker ;  -     Row Name 21 0847          Bed Mobility    Comment (Bed Mobility)  pt up in recliner  -[facility] Name 21 0847          Transfers    Transfers  sit-stand transfer;stand-sit transfer  -     Sit-Stand [place] (Transfers)  standby assist  -     Stand-Sit [place] (Transfers)  standby assist  -     [place] Level (Toilet Transfer)  standby assist  -[facility] Name 21 0847          Toilet Transfer    Type (Toilet Transfer)  sit-stand;stand-sit  -[facility] Name 21 0847          Gait/Stairs (Locomotion)    Gait/Stairs Locomotion  gait/ambulation independence;gait/ambulation assistive device;distance ambulated;gait pattern;gait deviations  -     [place] Level  (Gait)  standby assist  -JW     Distance in Feet (Gait)  40', 68'  -JW     Pattern (Gait)  step-to  -JW     Deviations/Abnormal Patterns (Gait)  nasir decreased  -JW     Comment (Gait/Stairs)  pt defers use of wx this tx, pt had no LOB, MD present at end of gt, pt defers ther ex  -JW     Row Name 04/11/21 0847          Plan of Care Review    Plan of Care Reviewed With  patient  -JW     Outcome Summary  pt up in recliner, agreeable to gt, pt t/fers sit<>stand w/ SBA, ambulates ~40', 68' w/ no AD w/ no LOB  -JW     Row Name 04/11/21 0847          Vital Signs    Pretreatment Heart Rate (beats/min)  68  -JW     Posttreatment Heart Rate (beats/min)  74  -JW     Pre Patient Position  Sitting  -JW     Intra Patient Position  Standing  -JW     Post Patient Position  Sitting  -JW     Row Name 04/11/21 0847          Bed Mobility Goal 1 (PT)    Activity/Assistive Device (Bed Mobility Goal 1, PT)  bed mobility activities, all  -JW     [place] Level/Cues Needed (Bed Mobility Goal 1, PT)  moderate assist (50-74% patient effort);minimum assist (75% or more patient effort);1 person assist  -JW     Time Frame (Bed Mobility Goal 1, PT)  1 week  -JW     Progress/Outcomes (Bed Mobility Goal 1, PT)  goal partially met;continuing progress toward goal  -     Row Name 04/11/21 0847          Transfer Goal 1 (PT)    Activity/Assistive Device (Transfer Goal 1, PT)  bed-to-chair/chair-to-bed  -JW     [place] Level/Cues Needed (Transfer Goal 1, PT)  minimum assist (75% or more patient effort);moderate assist (50-74% patient effort);2 person assist  -JW     Progress/Outcome (Transfer Goal 1, PT)  continuing progress toward goal;goal met  -     Row Name 04/11/21 0847          Gait Training Goal 1 (PT)    Activity/Assistive Device (Gait Training Goal 1, PT)  gait (walking locomotion);assistive device use  -     [place] Level (Gait Training Goal 1, PT)  moderate assist (50-74% patient effort);minimum assist (75% or more  patient effort);2 person assist  -JW     Distance (Gait Training Goal 1, PT)  100 ft or more  -JW     Time Frame (Gait Training Goal 1, PT)  2 weeks  -JW     Progress/Outcome (Gait Training Goal 1, PT)  continuing progress toward goal;goal partially met  -     Row Name 04/11/21 0847          ROM Goal 1 (PT)    ROM Goal 1 (PT)  AAROM WFL activley for hip, knee and ankle flx ex 20 reps x 2  -JW     Time Frame (ROM Goal 1, PT)  4 days  -JW     Progress/Outcome (ROM Goal 1, PT)  goal not met  -     Row Name 04/11/21 0847          Stairs Goal 1 (PT)    Activity/Assistive Device (Stairs Goal 1, PT)  ascending stairs;descending stairs  -     [place] Level/Cues Needed (Stairs Goal 1, PT)  modified independence  -     Number of Stairs (Stairs Goal 1, PT)  2 or more as lopez;  -     Time Frame (Stairs Goal 1, PT)  long term goal (LTG);3 weeks  -     Progress/Outcome (Stairs Goal 1, PT)  goal not met  -     Row Name 04/11/21 0847          Positioning and Restraints    Pre-Treatment Position  sitting in chair/recliner  -     Post Treatment Position  chair  -JW     In Chair  sitting;call light within reach;encouraged to call for assist;with family/caregiver MD present  -       User Key  (r) = Recorded By, (t) = Taken By, (c) = Cosigned By    Initials Name Provider Type     Dalila Pacheco, RENETTA Physical Therapy Assistant        Physical Therapy Education                 Title: PT OT SLP Therapies (In Progress)     Topic: Physical Therapy (In Progress)     Point: Mobility training (Done)     Learning Progress Summary           Patient Acceptance, D,E, NR,VU,DU by JUANCARLOS at 4/7/2021 1633    Comment: using platform FWRW for mobility;    Acceptance, E,TB, VU,NR,DU by ERIN at 4/6/2021 1430    Comment: home safety,room safety    Acceptance, D,E, NR by JUANCARLOS at 4/1/2021 1056    Comment: POC: instructions for eval/ex to encourage active  mobility   Family Acceptance, E,TB, VU,NR,DU by ERIN at 4/6/2021 1430    Comment:  home safety,room safety                   Point: Home exercise program (Not Started)     Learner Progress:  Not documented in this visit.          Point: Body mechanics (Done)     Learning Progress Summary           Patient Acceptance, E,TB, VU,NR,DU by LN at 4/6/2021 1430    Comment: home safety,room safety   Family Acceptance, E,TB, VU,NR,DU by LN at 4/6/2021 1430    Comment: home safety,room safety                   Point: Precautions (Done)     Learning Progress Summary           Patient Acceptance, D,E, NR,VU,DU by  at 4/7/2021 1633    Comment: using platform FWRW for mobility;    Acceptance, E,TB, VU,NR,DU by LN at 4/6/2021 1430    Comment: home safety,room safety   Family Acceptance, E,TB, VU,NR,DU by LN at 4/6/2021 1430    Comment: home safety,room safety                               User Key     Initials Effective Dates Name Provider Type Discipline     04/03/18 -  Gauri Anderson, PT Physical Therapist PT     03/07/18 -  Lor Tavarez PTA Physical Therapy Assistant PT              PT Recommendation and Plan  Anticipated Discharge Disposition (PT): sub acute care setting, long-term acute care facility  Plan of Care Reviewed With: patient  Outcome Summary: pt up in recliner, agreeable to gt, pt t/fers sit<>stand w/ SBA, ambulates ~40', 68' w/ no AD w/ no LOB       Time Calculation:   PT Charges     Row Name 04/11/21 0847             Time Calculation    Start Time  0847  -      Stop Time  0902  -      Time Calculation (min)  15 min  -      PT Received On  04/11/21  -         Time Calculation- PT    Total Timed Code Minutes- PT  15 minute(s)  -        User Key  (r) = Recorded By, (t) = Taken By, (c) = Cosigned By    Initials Name Provider Type    Dalila Jacobo, RENETTA Physical Therapy Assistant        Therapy Charges for Today     Code Description Service Date Service Provider Modifiers Qty    77533791524 HC GAIT TRAINING EA 15 MIN 4/10/2021 Dalila Pacheco, RENETTA GP 1     09260368614 HC PT THERAPEUTIC ACT EA 15 MIN 4/10/2021 Dalila Pacheco, PTA GP 1    87962991631 HC GAIT TRAINING EA 15 MIN 4/11/2021 Dalila Pacheco, PTA GP 1          PT G-Codes  Outcome Measure Options: AM-PAC 6 Clicks Daily Activity (OT)  AM-PAC 6 Clicks Score (PT): 15  AM-PAC 6 Clicks Score (OT): 13    Dalila Pacheco, RENETTA  4/11/2021

## 2021-04-11 NOTE — PLAN OF CARE
Goal Outcome Evaluation:  Plan of Care Reviewed With: patient     Outcome Summary: pt up in recliner, agreeable to gt, pt t/fers sit<>stand w/ SBA, ambulates ~40', 68' w/ no AD w/ no LOB

## 2021-04-11 NOTE — PLAN OF CARE
Goal Outcome Evaluation:     Progress: improving  Outcome Summary: Pt up in chair nitially, declining bath. pt performed B UE ther ex 3 lb dowel L UE and as AG with R UE when pain limited. Pt performed fx'al mobility chair<>toilet CGA sans AD with pt edu on improved safety with RW. Pt perfomed static standing 4 mins with 0 LOB.

## 2021-04-11 NOTE — PLAN OF CARE
Problem: Adult Inpatient Plan of Care  Goal: Plan of Care Review  Outcome: Ongoing, Progressing  Flowsheets  Taken 4/11/2021 0340 by Charlette Miranda RN  Progress: improving  Taken 4/10/2021 1449 by Yamilex Jenkins OTA  Plan of Care Reviewed With: patient  Goal: Patient-Specific Goal (Individualized)  Outcome: Ongoing, Progressing  Goal: Absence of Hospital-Acquired Illness or Injury  Outcome: Ongoing, Progressing  Intervention: Identify and Manage Fall Risk  Recent Flowsheet Documentation  Taken 4/11/2021 0200 by Charlette Miranda RN  Safety Promotion/Fall Prevention: safety round/check completed  Taken 4/11/2021 0000 by Charlette Miranda RN  Safety Promotion/Fall Prevention: safety round/check completed  Taken 4/10/2021 2236 by Charlette Miranda RN  Safety Promotion/Fall Prevention: safety round/check completed  Taken 4/10/2021 2140 by Charlette Miranda RN  Safety Promotion/Fall Prevention: safety round/check completed  Taken 4/10/2021 2040 by Charlette Miranda RN  Safety Promotion/Fall Prevention: safety round/check completed  Taken 4/10/2021 1956 by Charlette Miranda RN  Safety Promotion/Fall Prevention: safety round/check completed  Intervention: Prevent Skin Injury  Recent Flowsheet Documentation  Taken 4/11/2021 0200 by Charlette Miranda RN  Body Position: position changed independently  Taken 4/11/2021 0000 by Charlette Miranda RN  Body Position: position changed independently  Taken 4/10/2021 2236 by Charlette Miranda RN  Body Position: position changed independently  Taken 4/10/2021 2140 by Charlette Miranda RN  Body Position: position changed independently  Taken 4/10/2021 2040 by Charlette Miranda RN  Body Position: position changed independently  Taken 4/10/2021 1956 by Charlette Miranda RN  Body Position: position changed independently  Intervention: Prevent and Manage VTE (venous thromboembolism) Risk  Recent Flowsheet Documentation  Taken 4/10/2021 1956 by Charlette Miranda RN  VTE  Prevention/Management: (sub q heparin) other (see comments)  Intervention: Prevent Infection  Recent Flowsheet Documentation  Taken 4/10/2021 1956 by Charlette Miranda, RN  Infection Prevention: single patient room provided  Goal: Optimal Comfort and Wellbeing  Outcome: Ongoing, Progressing  Intervention: Provide Person-Centered Care  Recent Flowsheet Documentation  Taken 4/10/2021 1956 by Charlette Miranda, RN  Trust Relationship/Rapport: care explained  Goal: Readiness for Transition of Care  Outcome: Ongoing, Progressing   Goal Outcome Evaluation:     Progress: improving

## 2021-04-12 ENCOUNTER — ANESTHESIA EVENT (OUTPATIENT)
Dept: GASTROENTEROLOGY | Facility: HOSPITAL | Age: 61
End: 2021-04-12

## 2021-04-12 ENCOUNTER — READMISSION MANAGEMENT (OUTPATIENT)
Dept: CALL CENTER | Facility: HOSPITAL | Age: 61
End: 2021-04-12

## 2021-04-12 ENCOUNTER — ANESTHESIA (OUTPATIENT)
Dept: GASTROENTEROLOGY | Facility: HOSPITAL | Age: 61
End: 2021-04-12

## 2021-04-12 VITALS
HEIGHT: 62 IN | SYSTOLIC BLOOD PRESSURE: 158 MMHG | OXYGEN SATURATION: 94 % | RESPIRATION RATE: 18 BRPM | BODY MASS INDEX: 50.79 KG/M2 | HEART RATE: 80 BPM | WEIGHT: 276 LBS | DIASTOLIC BLOOD PRESSURE: 82 MMHG | TEMPERATURE: 97.4 F

## 2021-04-12 PROBLEM — R13.19 ESOPHAGEAL DYSPHAGIA: Status: ACTIVE | Noted: 2021-04-12

## 2021-04-12 PROBLEM — B37.81: Status: ACTIVE | Noted: 2021-04-12

## 2021-04-12 PROBLEM — Z20.822 COVID-19 RULED OUT BY LABORATORY TESTING: Status: ACTIVE | Noted: 2021-04-12

## 2021-04-12 LAB
ALBUMIN SERPL-MCNC: 2.8 G/DL (ref 3.5–5.2)
ALBUMIN/GLOB SERPL: 0.6 G/DL
ALP SERPL-CCNC: 190 U/L (ref 39–117)
ALT SERPL W P-5'-P-CCNC: 12 U/L (ref 1–33)
ANION GAP SERPL CALCULATED.3IONS-SCNC: 6 MMOL/L (ref 5–15)
AST SERPL-CCNC: 18 U/L (ref 1–32)
BASOPHILS # BLD AUTO: 0.1 10*3/MM3 (ref 0–0.2)
BASOPHILS NFR BLD AUTO: 0.9 % (ref 0–1.5)
BILIRUB SERPL-MCNC: 0.2 MG/DL (ref 0–1.2)
BUN SERPL-MCNC: 32 MG/DL (ref 8–23)
BUN/CREAT SERPL: 16.3 (ref 7–25)
CALCIUM SPEC-SCNC: 9 MG/DL (ref 8.6–10.5)
CHLORIDE SERPL-SCNC: 100 MMOL/L (ref 98–107)
CO2 SERPL-SCNC: 27 MMOL/L (ref 22–29)
CREAT SERPL-MCNC: 1.96 MG/DL (ref 0.57–1)
DEPRECATED RDW RBC AUTO: 48.9 FL (ref 37–54)
EOSINOPHIL # BLD AUTO: 0.36 10*3/MM3 (ref 0–0.4)
EOSINOPHIL NFR BLD AUTO: 3.4 % (ref 0.3–6.2)
ERYTHROCYTE [DISTWIDTH] IN BLOOD BY AUTOMATED COUNT: 15.5 % (ref 12.3–15.4)
GFR SERPL CREATININE-BSD FRML MDRD: 26 ML/MIN/1.73
GLOBULIN UR ELPH-MCNC: 4.5 GM/DL
GLUCOSE BLDC GLUCOMTR-MCNC: 147 MG/DL (ref 70–130)
GLUCOSE BLDC GLUCOMTR-MCNC: 172 MG/DL (ref 70–130)
GLUCOSE BLDC GLUCOMTR-MCNC: 284 MG/DL (ref 70–130)
GLUCOSE BLDC GLUCOMTR-MCNC: 287 MG/DL (ref 70–130)
GLUCOSE SERPL-MCNC: 137 MG/DL (ref 65–99)
HCT VFR BLD AUTO: 30.7 % (ref 34–46.6)
HGB BLD-MCNC: 9.9 G/DL (ref 12–15.9)
IMM GRANULOCYTES # BLD AUTO: 0.13 10*3/MM3 (ref 0–0.05)
IMM GRANULOCYTES NFR BLD AUTO: 1.2 % (ref 0–0.5)
LYMPHOCYTES # BLD AUTO: 3.13 10*3/MM3 (ref 0.7–3.1)
LYMPHOCYTES NFR BLD AUTO: 29.6 % (ref 19.6–45.3)
MCH RBC QN AUTO: 28.3 PG (ref 26.6–33)
MCHC RBC AUTO-ENTMCNC: 32.2 G/DL (ref 31.5–35.7)
MCV RBC AUTO: 87.7 FL (ref 79–97)
MONOCYTES # BLD AUTO: 0.66 10*3/MM3 (ref 0.1–0.9)
MONOCYTES NFR BLD AUTO: 6.2 % (ref 5–12)
NEUTROPHILS NFR BLD AUTO: 58.7 % (ref 42.7–76)
NEUTROPHILS NFR BLD AUTO: 6.2 10*3/MM3 (ref 1.7–7)
NRBC BLD AUTO-RTO: 0 /100 WBC (ref 0–0.2)
PLATELET # BLD AUTO: 374 10*3/MM3 (ref 140–450)
PMV BLD AUTO: 9 FL (ref 6–12)
POTASSIUM SERPL-SCNC: 4.4 MMOL/L (ref 3.5–5.2)
PROT SERPL-MCNC: 7.3 G/DL (ref 6–8.5)
RBC # BLD AUTO: 3.5 10*6/MM3 (ref 3.77–5.28)
SARS-COV-2 N GENE RESP QL NAA+PROBE: NOT DETECTED
SODIUM SERPL-SCNC: 133 MMOL/L (ref 136–145)
WBC # BLD AUTO: 10.58 10*3/MM3 (ref 3.4–10.8)

## 2021-04-12 PROCEDURE — 88312 SPECIAL STAINS GROUP 1: CPT

## 2021-04-12 PROCEDURE — 97530 THERAPEUTIC ACTIVITIES: CPT

## 2021-04-12 PROCEDURE — 97535 SELF CARE MNGMENT TRAINING: CPT

## 2021-04-12 PROCEDURE — 63710000001 INSULIN ASPART PER 5 UNITS: Performed by: INTERNAL MEDICINE

## 2021-04-12 PROCEDURE — 99239 HOSP IP/OBS DSCHRG MGMT >30: CPT | Performed by: STUDENT IN AN ORGANIZED HEALTH CARE EDUCATION/TRAINING PROGRAM

## 2021-04-12 PROCEDURE — 82962 GLUCOSE BLOOD TEST: CPT

## 2021-04-12 PROCEDURE — 88305 TISSUE EXAM BY PATHOLOGIST: CPT

## 2021-04-12 PROCEDURE — 0DB38ZX EXCISION OF LOWER ESOPHAGUS, VIA NATURAL OR ARTIFICIAL OPENING ENDOSCOPIC, DIAGNOSTIC: ICD-10-PCS | Performed by: INTERNAL MEDICINE

## 2021-04-12 PROCEDURE — 25010000002 HEPARIN (PORCINE) PER 1000 UNITS: Performed by: INTERNAL MEDICINE

## 2021-04-12 PROCEDURE — 25010000002 PROPOFOL 10 MG/ML EMULSION: Performed by: NURSE ANESTHETIST, CERTIFIED REGISTERED

## 2021-04-12 PROCEDURE — 43239 EGD BIOPSY SINGLE/MULTIPLE: CPT | Performed by: INTERNAL MEDICINE

## 2021-04-12 PROCEDURE — 97110 THERAPEUTIC EXERCISES: CPT

## 2021-04-12 PROCEDURE — 94799 UNLISTED PULMONARY SVC/PX: CPT

## 2021-04-12 PROCEDURE — 0DB78ZX EXCISION OF STOMACH, PYLORUS, VIA NATURAL OR ARTIFICIAL OPENING ENDOSCOPIC, DIAGNOSTIC: ICD-10-PCS | Performed by: INTERNAL MEDICINE

## 2021-04-12 PROCEDURE — 85025 COMPLETE CBC W/AUTO DIFF WBC: CPT | Performed by: CHIROPRACTOR

## 2021-04-12 PROCEDURE — 25010000002 HEPARIN (PORCINE) PER 1000 UNITS: Performed by: CHIROPRACTOR

## 2021-04-12 PROCEDURE — 80053 COMPREHEN METABOLIC PANEL: CPT | Performed by: CHIROPRACTOR

## 2021-04-12 PROCEDURE — 97116 GAIT TRAINING THERAPY: CPT

## 2021-04-12 RX ORDER — INSULIN GLARGINE 100 [IU]/ML
30 INJECTION, SOLUTION SUBCUTANEOUS EVERY 12 HOURS
Qty: 60 ML | Refills: 11 | Status: SHIPPED | OUTPATIENT
Start: 2021-04-12 | End: 2021-06-09 | Stop reason: HOSPADM

## 2021-04-12 RX ORDER — SUCRALFATE 1 G/1
1 TABLET ORAL
Status: DISCONTINUED | OUTPATIENT
Start: 2021-04-12 | End: 2021-04-12 | Stop reason: HOSPADM

## 2021-04-12 RX ORDER — ONDANSETRON 2 MG/ML
4 INJECTION INTRAMUSCULAR; INTRAVENOUS ONCE AS NEEDED
Status: DISCONTINUED | OUTPATIENT
Start: 2021-04-12 | End: 2021-04-12 | Stop reason: HOSPADM

## 2021-04-12 RX ORDER — PROPOFOL 10 MG/ML
VIAL (ML) INTRAVENOUS AS NEEDED
Status: DISCONTINUED | OUTPATIENT
Start: 2021-04-12 | End: 2021-04-12 | Stop reason: SURG

## 2021-04-12 RX ORDER — DEXTROSE AND SODIUM CHLORIDE 5; .45 G/100ML; G/100ML
30 INJECTION, SOLUTION INTRAVENOUS CONTINUOUS PRN
Status: DISCONTINUED | OUTPATIENT
Start: 2021-04-12 | End: 2021-04-12 | Stop reason: HOSPADM

## 2021-04-12 RX ORDER — LIDOCAINE HYDROCHLORIDE 20 MG/ML
INJECTION, SOLUTION EPIDURAL; INFILTRATION; INTRACAUDAL; PERINEURAL AS NEEDED
Status: DISCONTINUED | OUTPATIENT
Start: 2021-04-12 | End: 2021-04-12 | Stop reason: SURG

## 2021-04-12 RX ORDER — SUCRALFATE 1 G/1
1 TABLET ORAL 4 TIMES DAILY
Qty: 120 TABLET | Refills: 3 | Status: ON HOLD | OUTPATIENT
Start: 2021-04-12 | End: 2021-07-30

## 2021-04-12 RX ORDER — SODIUM CHLORIDE 9 MG/ML
INJECTION, SOLUTION INTRAVENOUS CONTINUOUS PRN
Status: DISCONTINUED | OUTPATIENT
Start: 2021-04-12 | End: 2021-04-12 | Stop reason: SURG

## 2021-04-12 RX ORDER — SODIUM BICARBONATE 650 MG/1
650 TABLET ORAL 2 TIMES DAILY
Qty: 60 TABLET | Refills: 1 | Status: SHIPPED | OUTPATIENT
Start: 2021-04-12 | End: 2021-11-15 | Stop reason: HOSPADM

## 2021-04-12 RX ADMIN — HEPARIN SODIUM 5000 UNITS: 5000 INJECTION INTRAVENOUS; SUBCUTANEOUS at 05:41

## 2021-04-12 RX ADMIN — CLINDAMYCIN HYDROCHLORIDE 600 MG: 150 CAPSULE ORAL at 05:41

## 2021-04-12 RX ADMIN — PROPOFOL 20 MG: 10 INJECTION, EMULSION INTRAVENOUS at 11:33

## 2021-04-12 RX ADMIN — SODIUM CHLORIDE, PRESERVATIVE FREE 10 ML: 5 INJECTION INTRAVENOUS at 09:00

## 2021-04-12 RX ADMIN — PROPOFOL 20 MG: 10 INJECTION, EMULSION INTRAVENOUS at 11:31

## 2021-04-12 RX ADMIN — HEPARIN SODIUM 5000 UNITS: 5000 INJECTION INTRAVENOUS; SUBCUTANEOUS at 13:27

## 2021-04-12 RX ADMIN — INSULIN ASPART 20 UNITS: 100 INJECTION, SOLUTION INTRAVENOUS; SUBCUTANEOUS at 17:00

## 2021-04-12 RX ADMIN — NYSTATIN 2 APPLICATION: 100000 POWDER TOPICAL at 10:00

## 2021-04-12 RX ADMIN — IPRATROPIUM BROMIDE AND ALBUTEROL SULFATE 3 ML: 2.5; .5 SOLUTION RESPIRATORY (INHALATION) at 06:50

## 2021-04-12 RX ADMIN — PROPOFOL 20 MG: 10 INJECTION, EMULSION INTRAVENOUS at 11:32

## 2021-04-12 RX ADMIN — CLINDAMYCIN HYDROCHLORIDE 600 MG: 150 CAPSULE ORAL at 13:27

## 2021-04-12 RX ADMIN — INSULIN ASPART 12 UNITS: 100 INJECTION, SOLUTION INTRAVENOUS; SUBCUTANEOUS at 16:59

## 2021-04-12 RX ADMIN — GABAPENTIN 400 MG: 400 CAPSULE ORAL at 16:58

## 2021-04-12 RX ADMIN — SODIUM CHLORIDE: 9 INJECTION, SOLUTION INTRAVENOUS at 11:28

## 2021-04-12 RX ADMIN — NYSTATIN: 100000 POWDER TOPICAL at 17:00

## 2021-04-12 RX ADMIN — INSULIN ASPART 4 UNITS: 100 INJECTION, SOLUTION INTRAVENOUS; SUBCUTANEOUS at 13:38

## 2021-04-12 RX ADMIN — TRAMADOL HYDROCHLORIDE 50 MG: 50 TABLET, FILM COATED ORAL at 13:27

## 2021-04-12 RX ADMIN — IPRATROPIUM BROMIDE AND ALBUTEROL SULFATE 3 ML: 2.5; .5 SOLUTION RESPIRATORY (INHALATION) at 15:12

## 2021-04-12 RX ADMIN — SUCRALFATE 1 G: 1 TABLET ORAL at 16:59

## 2021-04-12 RX ADMIN — LIDOCAINE HYDROCHLORIDE 100 MG: 20 INJECTION, SOLUTION EPIDURAL; INFILTRATION; INTRACAUDAL; PERINEURAL at 11:29

## 2021-04-12 RX ADMIN — PROPOFOL 70 MG: 10 INJECTION, EMULSION INTRAVENOUS at 11:29

## 2021-04-12 NOTE — ANESTHESIA PREPROCEDURE EVALUATION
Anesthesia Evaluation     Patient summary reviewed and Nursing notes reviewed   no history of anesthetic complications:  NPO Solid Status: > 8 hours  NPO Liquid Status: > 8 hours           Airway   Mallampati: II  TM distance: <3 FB  Dental - normal exam         Pulmonary    (+) a smoker Former, COPD moderate, asthma,sleep apnea, wheezes,   Cardiovascular - normal exam  Exercise tolerance: poor (<4 METS)    ECG reviewed  PT is on anticoagulation therapy  Patient on routine beta blocker  Rhythm: regular    (+) hypertension well controlled 2 medications or greater, CAD, CABG >6 Months, cardiac stents more than 12 months ago dysrhythmias, PVD, hyperlipidemia,     ROS comment: 10/2020      Neuro/Psych  (+) numbness, psychiatric history Anxiety,     GI/Hepatic/Renal/Endo    (+) obesity, morbid obesity, GERD well controlled,  renal disease CRI, diabetes mellitus type 2 poorly controlled using insulin,     Musculoskeletal     (+) neck pain,   Abdominal   (+) obese,    Substance History - negative use     OB/GYN negative ob/gyn ROS         Other - negative ROS                     Anesthesia Plan    ASA 4 - emergent     MAC     intravenous induction     Anesthetic plan, all risks, benefits, and alternatives have been provided, discussed and informed consent has been obtained with: patient.

## 2021-04-12 NOTE — PROGRESS NOTES
FAMILY MEDICINE DAILY PROGRESS NOTE    NAME: Yamileth Slater  : 1960  MRN: [id number]      LOS: 11 days     PROVIDER OF SERVICE: Mone Us MD    Chief Complaint: Type 2 diabetes mellitus with hyperglycemia, with long-term current use of insulin (CMS/[facility])    Subjective:     Interval History:  History taken from: patient  Patient has no concerns this morning, sitting comfortably in chair. Fasting sugar this morning was within the normal range. EGD scheduled for today.     Review of Systems:   Review of Systems   Constitutional: Negative for activity change, appetite change, chills, fatigue and fever.   HENT: Positive for trouble swallowing. Negative for drooling, ear discharge, ear pain, facial swelling, hearing loss, mouth sores, rhinorrhea and sinus pain.    Eyes: Negative for pain, redness and itching.   Respiratory: Negative for cough, choking, chest tightness, shortness of breath and stridor.    Cardiovascular: Negative for chest pain, palpitations and leg swelling.   Gastrointestinal: Negative for abdominal distention, abdominal pain, anal bleeding, blood in stool, constipation, diarrhea and nausea.   Endocrine: Negative for heat intolerance, polydipsia and polyphagia.   Genitourinary: Negative for dysuria, flank pain, frequency and genital sores.   Musculoskeletal: Negative for back pain, gait problem, joint swelling and myalgias.   Skin: Negative for pallor and rash.   Neurological: Negative for seizures, facial asymmetry, speech difficulty, light-headedness, numbness and headaches.   Hematological: Negative for adenopathy. Does not bruise/bleed easily.   Psychiatric/Behavioral: Negative for confusion, decreased concentration, dysphoric mood and hallucinations. The patient is not nervous/anxious and is not hyperactive.        Objective:     Vital Signs  Temp:  [96.7 °F (35.9 °C)-96.9 °F (36.1 °C)] 96.9 °F (36.1 °C)  Heart Rate:  [65-73] 71  Resp:  [16-18] 16  BP: (136-145)/(66-79) 145/66    Body mass index is 50.48 kg/m².    Physical Exam  Physical Exam  Constitutional:       Appearance: She is well-developed. She is obese.   HENT:      Head: Normocephalic and atraumatic.      Right Ear: External ear normal.      Left Ear: External ear normal.      Nose: Nose normal.   Eyes:      Conjunctiva/sclera: Conjunctivae normal.      Pupils: Pupils are equal, round, and reactive to light.   Cardiovascular:      Rate and Rhythm: Normal rate and regular rhythm.      Heart sounds: Normal heart sounds.   Pulmonary:      Effort: Pulmonary effort is normal.      Breath sounds: Normal breath sounds.   Abdominal:      General: Bowel sounds are normal.      Palpations: Abdomen is soft.      Tenderness: There is abdominal tenderness.   Musculoskeletal:         General: Normal range of motion.      Cervical back: Normal range of motion and neck supple.   Skin:     General: Skin is warm and dry.   Neurological:      Mental Status: She is alert and oriented to person, place, and time.   Psychiatric:         Behavior: Behavior normal.         Thought Content: Thought content normal.         Judgment: Judgment normal.         Scheduled Meds   atorvastatin, 40 mg, Oral, Daily  clindamycin, 600 mg, Oral, Q8H  clopidogrel, 75 mg, Oral, Daily  gabapentin, 400 mg, Oral, TID  heparin (porcine), 5,000 Units, Subcutaneous, Q8H  insulin aspart, 0-24 Units, Subcutaneous, TID AC  insulin aspart, 20 Units, Subcutaneous, TID With Meals  insulin detemir, 30 Units, Subcutaneous, Q12H  ipratropium-albuterol, 3 mL, Nebulization, 4x Daily - RT  isosorbide mononitrate, 30 mg, Oral, Daily  metoprolol tartrate, 100 mg, Oral, Q12H  montelukast, 10 mg, Oral, Nightly  nystatin, , Topical, TID  oxybutynin XL, 5 mg, Oral, Daily  pantoprazole, 40 mg, Oral, Nightly  potassium chloride, 10 mEq, Oral, Daily  sodium bicarbonate, 1,300 mg, Oral, BID  sodium chloride, 10 mL, Intravenous, Q12H  sodium chloride, 10 mL, Intravenous, Q12H  sodium chloride, 10  mL, Intravenous, Q12H       PRN Meds   •  acetaminophen  •  albuterol  •  calcium carbonate  •  cyclobenzaprine  •  dextrose  •  dextrose  •  glucagon (human recombinant)  •  nitroglycerin  •  ondansetron ODT  •  promethazine  •  sodium chloride  •  [COMPLETED] Insert peripheral IV **AND** sodium chloride  •  sodium chloride  •  sodium chloride  •  traMADol      Diagnostic Data    Results from last 7 days   Lab Units 04/12/21  0541   WBC 10*3/mm3 10.58   HEMOGLOBIN g/dL 9.9*   HEMATOCRIT % 30.7*   PLATELETS 10*3/mm3 374   GLUCOSE mg/dL 137*   CREATININE mg/dL 1.96*   BUN mg/dL 32*   SODIUM mmol/L 133*   POTASSIUM mmol/L 4.4   AST (SGOT) U/L 18   ALT (SGPT) U/L 12   ALK PHOS U/L 190*   BILIRUBIN mg/dL 0.2   ANION GAP mmol/L 6.0       No radiology results for the last day      I reviewed the patient's new clinical results.  I reviewed the patient's new imaging results and agree with the interpretation.  I reviewed the patient's other test results and agree with the interpretation    Assessment/Plan:     Active Hospital Problems    Diagnosis  POA   • **Type 2 diabetes mellitus with hyperglycemia, with long-term current use of insulin (CMS/[facility]) [E11.65, Z79.4]  Not Applicable     -Glucose monitoring  -Levemir 35u bid, Aspart 20 units w/meals, ISS   +32 units SSI in last 24 hours  -Consistent carb diet limited to less than 45 g of carbohydrates per meal.  -Patient attempting to cheat on diet by ordering cheeseburger and fries from nutrition services.  Nurse aware     • COVID-19 ruled out by laboratory testing [Z20.822]  Unknown   • Esophageal dysphagia [R13.10]  Unknown     Added automatically from request for surgery [id number]     • Alkaline phosphatase elevation [R74.8]  Unknown     Alkaline phosphatase level elevated to 199.  -GGT ordered to differentiate source  -     • MRSA infection [A49.02]  Yes   • Cutaneous candidiasis [B37.2]  Yes     - Nystatin     • Community acquired pneumonia of right middle lobe of  lung [J18.9]  Yes     - Confirmed on CXR. CXR 4/5 unremarkable   -Aspiration pneumonitis now suspected.  Patient MRSA positive in nares.  -IV clindamycin 300 mg 3 times daily finish out 10-day course of antibiotic treatment. Started 4/3/21. Switched to Clindamycin 600mg q8h 4/5/21  -WBCs today 13.5 slight increase.  Patient afebrile.     • Hyponatremia [E87.1]  Yes     -Normal once corrected for hyperglycemia     • Anemia [D64.9]  Yes     -Iron studies indicative of mixed iron deficiency and chronic disease  -Continue home ferrous sulfate       • Acute renal failure superimposed on chronic kidney disease (CMS/HCC) [N17.9, N18.9]  Yes     -Hold nephrotoxic drugs  -Nephrology consulted and appreciate recommendations  -Improving creatinine today 1.62  -Baseline: 1.8-2       • Chronic midline low back pain without sciatica [M54.5, G89.29]  Yes     - Tramadol PRN     • Chronic obstructive lung disease (CMS/HCC) [J44.9]  Yes     -O2 supplementation  -Home inhalers as needed  -DuoNebs  -CPAP at night     • GERD (gastroesophageal reflux disease) [K21.9]  Yes     - IV protonix qday  - Tums     • Coronary artery disease [I25.10]  Yes     -Continue home meds     • Current smoker [F17.200]  Yes     -Patch         DVT prophylaxis: Heparin  Code status is   Code Status and Medical Interventions:   Ordered at: 03/31/21 1811     Code Status:    CPR     Medical Interventions (Level of Support Prior to Arrest):    Full       Plan for disposition:Where: home and When:  1-2days           Mone Us M.D. PGY3  [facility]  [address] [place] [address]  Office: [phone]    This document has been electronically signed by Mone Us MD on April 12, 2021 08:29 CDT

## 2021-04-12 NOTE — PLAN OF CARE
Problem: Adult Inpatient Plan of Care  Goal: Plan of Care Review  Outcome: Ongoing, Progressing  Flowsheets  Taken 4/12/2021 0507 by Charlette Miranda RN  Progress: improving  Taken 4/11/2021 0847 by Dalila Pacheco PTA  Plan of Care Reviewed With: patient  Goal: Patient-Specific Goal (Individualized)  Outcome: Ongoing, Progressing  Goal: Absence of Hospital-Acquired Illness or Injury  Outcome: Ongoing, Progressing  Intervention: Identify and Manage Fall Risk  Recent Flowsheet Documentation  Taken 4/12/2021 0400 by Charlette Miranda RN  Safety Promotion/Fall Prevention: safety round/check completed  Taken 4/12/2021 0200 by Charlette Miranda RN  Safety Promotion/Fall Prevention: safety round/check completed  Taken 4/12/2021 0000 by Charlette Miranda RN  Safety Promotion/Fall Prevention: safety round/check completed  Taken 4/11/2021 2230 by Charlette Miranda RN  Safety Promotion/Fall Prevention: safety round/check completed  Taken 4/11/2021 2140 by Charlette Miranda RN  Safety Promotion/Fall Prevention: safety round/check completed  Taken 4/11/2021 2040 by Charlette Miranda RN  Safety Promotion/Fall Prevention: safety round/check completed  Taken 4/11/2021 1947 by Charlette Miranda RN  Safety Promotion/Fall Prevention: safety round/check completed  Intervention: Prevent Skin Injury  Recent Flowsheet Documentation  Taken 4/12/2021 0400 by Charlette Miranda RN  Body Position: position changed independently  Taken 4/12/2021 0200 by Charlette Miranda RN  Body Position: position changed independently  Taken 4/12/2021 0000 by Charlette Miranda RN  Body Position: position changed independently  Taken 4/11/2021 2230 by Charlette Miranda RN  Body Position: position changed independently  Taken 4/11/2021 2140 by Charlette Miranda RN  Body Position: position changed independently  Taken 4/11/2021 2040 by Charlette Miranda RN  Body Position: position changed independently  Taken 4/11/2021 1947 by Ruben  Charlette NEFF RN  Body Position: position changed independently  Intervention: Prevent and Manage VTE (venous thromboembolism) Risk  Recent Flowsheet Documentation  Taken 4/11/2021 1947 by Charlette Miranda RN  VTE Prevention/Management: (sub q heparin) other (see comments)  Intervention: Prevent Infection  Recent Flowsheet Documentation  Taken 4/11/2021 1947 by Charlette Miranda RN  Infection Prevention: single patient room provided  Goal: Optimal Comfort and Wellbeing  Outcome: Ongoing, Progressing  Intervention: Provide Person-Centered Care  Recent Flowsheet Documentation  Taken 4/11/2021 1947 by Charlette Miranda RN  Trust Relationship/Rapport: care explained  Goal: Readiness for Transition of Care  Outcome: Ongoing, Progressing   Goal Outcome Evaluation:     Progress: improving

## 2021-04-12 NOTE — DISCHARGE SUMMARY
DISCHARGE SUMMARY    PATIENT NAME: Yamileth Slater       PHYSICIAN: Mone Us MD  : 1960  MRN: [id number]    ADMITTED: 3/31/2021     DISCHARGED: 21      ADMISSION DIAGNOSES:  Active Hospital Problems    Diagnosis  POA   • **Type 2 diabetes mellitus with hyperglycemia, with long-term current use of insulin (CMS/HCC) [E11.65, Z79.4]  Not Applicable   • COVID-19 ruled out by laboratory testing [Z20.822]  Yes   • Esophageal dysphagia [R13.10]  Yes   • Monilial esophagitis (CMS/HCC) [B37.81]  Unknown   • Alkaline phosphatase elevation [R74.8]  Yes   • MRSA infection [A49.02]  Yes   • Cutaneous candidiasis [B37.2]  Yes   • Community acquired pneumonia of right middle lobe of lung [J18.9]  Yes   • Hyponatremia [E87.1]  Yes   • Anemia [D64.9]  Yes   • Acute renal failure superimposed on chronic kidney disease (CMS/HCC) [N17.9, N18.9]  Yes   • Chronic midline low back pain without sciatica [M54.5, G89.29]  Yes   • Chronic obstructive lung disease (CMS/HCC) [J44.9]  Yes   • GERD (gastroesophageal reflux disease) [K21.9]  Yes   • Coronary artery disease [I25.10]  Yes   • Current smoker [F17.200]  Yes      Resolved Hospital Problems    Diagnosis Date Resolved POA   • Metabolic acidosis [E87.2] 2021 Yes   • Hyperkalemia [E87.5] 2021 Yes   • Hypocalcemia [E83.51] 2021 Yes   • Acute cystitis [N30.00] 2021 Yes     DISCHARGE DIAGNOSES:   Active Hospital Problems    Diagnosis  POA   • **Type 2 diabetes mellitus with hyperglycemia, with long-term current use of insulin (CMS/HCC) [E11.65, Z79.4]  Not Applicable   • COVID-19 ruled out by laboratory testing [Z20.822]  Yes   • Esophageal dysphagia [R13.10]  Yes   • Monilial esophagitis (CMS/HCC) [B37.81]  Unknown   • Alkaline phosphatase elevation [R74.8]  Yes   • MRSA infection [A49.02]  Yes   • Cutaneous candidiasis [B37.2]  Yes   • Community acquired pneumonia of right middle lobe of lung [J18.9]  Yes   • Hyponatremia [E87.1]  Yes   •  Anemia [D64.9]  Yes   • Acute renal failure superimposed on chronic kidney disease (CMS/HCC) [N17.9, N18.9]  Yes   • Chronic midline low back pain without sciatica [M54.5, G89.29]  Yes   • Chronic obstructive lung disease (CMS/HCC) [J44.9]  Yes   • GERD (gastroesophageal reflux disease) [K21.9]  Yes   • Coronary artery disease [I25.10]  Yes   • Current smoker [F17.200]  Yes      Resolved Hospital Problems    Diagnosis Date Resolved POA   • Metabolic acidosis [E87.2] 04/08/2021 Yes   • Hyperkalemia [E87.5] 04/08/2021 Yes   • Hypocalcemia [E83.51] 04/08/2021 Yes   • Acute cystitis [N30.00] 04/08/2021 Yes       SERVICE: Family Medicine Residency  Attending: Pauline Martinez MD  Resident: Mone Us MD    CONSULTS:   Consult Orders (all) (From admission, onward)     Start     Ordered    04/09/21 1217  Inpatient Gastroenterology Consult  Once     Specialty:  Gastroenterology  Provider:  Mingo Duarte MD    04/09/21 1217    04/03/21 1343  Inpatient Nephrology Consult  Once     Specialty:  Nephrology  Provider:  Timothy Valle MD    04/03/21 1344    03/31/21 2016  Inpatient Nutrition Consult  Once,   Status:  Canceled     Provider:  (Not yet assigned)    03/31/21 2015 03/31/21 2016  Inpatient Diabetes Educator Consult  Once,   Status:  Canceled     Provider:  (Not yet assigned)    03/31/21 2015 03/31/21 1728  Inpatient Nutrition Consult  Once,   Status:  Canceled     Provider:  (Not yet assigned)    03/31/21 1728    03/31/21 1728  Inpatient Diabetes Educator Consult  Once,   Status:  Canceled     Provider:  (Not yet assigned)    03/31/21 1728                PROCEDURES:   EGD: 4/12/21    HISTORY OF PRESENT ILLNESS: Per Dr. Coffey's H&P on 3/31/21  Yamileth Slater is a 61 y.o. female with a CMH of type 2 diabetes mellitus, chronic kidney disease, anemia, COPD, tobacco use, GERD who presents to the ED with complaints of weakness and multiple falls.  The symptoms have been present for the past 2 weeks and  has progressively worsened.  Patient states that she fell multiple times and thus developed gluteal and back pain.  Her  reports that she appears lethargic and often has moments where she is confused and speaks to people that are not there.  She is normally able to ambulate however has not had much success with standing given that her legs give out easily.    DIAGNOSTIC DATA:   Lab Results (last 24 hours)     Procedure Component Value Units Date/Time    POC Glucose Once [[id number]]  (Abnormal) Collected: 04/12/21 1649    Specimen: Blood Updated: 04/12/21 1703     Glucose 287 mg/dL      Comment: RN NotifiedOperator: [id number] JUAQUIN GONGORAFERMeter ID: [id number]       POC Glucose Once [[id number]]  (Abnormal) Collected: 04/12/21 1006    Specimen: Blood Updated: 04/12/21 1646     Glucose 172 mg/dL      Comment: RN NotifiedOperator: [id number] JUAQUIN GONGORAFERMeter ID: [id number]       Comprehensive Metabolic Panel [[id number]]  (Abnormal) Collected: 04/12/21 0541    Specimen: Blood Updated: 04/12/21 0643     Glucose 137 mg/dL      BUN 32 mg/dL      Creatinine 1.96 mg/dL      Sodium 133 mmol/L      Potassium 4.4 mmol/L      Chloride 100 mmol/L      CO2 27.0 mmol/L      Calcium 9.0 mg/dL      Total Protein 7.3 g/dL      Albumin 2.80 g/dL      ALT (SGPT) 12 U/L      AST (SGOT) 18 U/L      Alkaline Phosphatase 190 U/L      Total Bilirubin 0.2 mg/dL      eGFR Non African Amer 26 mL/min/1.73      Globulin 4.5 gm/dL      A/G Ratio 0.6 g/dL      BUN/Creatinine Ratio 16.3     Anion Gap 6.0 mmol/L     Narrative:      GFR Normal >60  Chronic Kidney Disease <60  Kidney Failure <15      POC Glucose Once [[id number]]  (Abnormal) Collected: 04/12/21 0624    Specimen: Blood Updated: 04/12/21 0637     Glucose 147 mg/dL      Comment: RN NotifiedOperator: [id number] DARIEN COLEMANMetjose ID: [id number]       CBC & Differential [[id number]]  (Abnormal) Collected: 04/12/21 0541    Specimen: Blood Updated: 04/12/21 0629    Narrative:       The following orders were created for panel order CBC & Differential.  Procedure                               Abnormality         Status                     ---------                               -----------         ------                     CBC Auto Differential[[id number]]        Abnormal            Final result                 Please view results for these tests on the individual orders.    CBC Auto Differential [[id number]]  (Abnormal) Collected: 04/12/21 0541    Specimen: Blood Updated: 04/12/21 0629     WBC 10.58 10*3/mm3      RBC 3.50 10*6/mm3      Hemoglobin 9.9 g/dL      Hematocrit 30.7 %      MCV 87.7 fL      MCH 28.3 pg      MCHC 32.2 g/dL      RDW 15.5 %      RDW-SD 48.9 fl      MPV 9.0 fL      Platelets 374 10*3/mm3      Neutrophil % 58.7 %      Lymphocyte % 29.6 %      Monocyte % 6.2 %      Eosinophil % 3.4 %      Basophil % 0.9 %      Immature Grans % 1.2 %      Neutrophils, Absolute 6.20 10*3/mm3      Lymphocytes, Absolute 3.13 10*3/mm3      Monocytes, Absolute 0.66 10*3/mm3      Eosinophils, Absolute 0.36 10*3/mm3      Basophils, Absolute 0.10 10*3/mm3      Immature Grans, Absolute 0.13 10*3/mm3      nRBC 0.0 /100 WBC     COVID-19, [facility] IN-HOUSE, NP SWAB IN TRANSPORT MEDIA 8-10 HR TAT - Swab, Nasopharynx [[id number]]  (Normal) Collected: 04/11/21 1800    Specimen: Swab from Nasopharynx Updated: 04/12/21 0131     COVID19 Not Detected    Narrative:      Testing performed by Real Time RT-PCR  This test has not been approved by the [facility] but is authorized under the Emergency Use Act (EUA)    Fact sheet for providers: https://www.fda.gov/media/245421/download    Fact sheet for patients: https://www.fda.gov/media/306901/download        POC Glucose Once [[id number]]  (Abnormal) Collected: 04/11/21 2039    Specimen: Blood Updated: 04/12/21 0038     Glucose 284 mg/dL      Comment: RN NotifiedOperator: [id number] DARIEN Cruz ID: [id number]              HOSPITAL COURSE:  1. Patient developed latisha  in the setting of her dka. Nephrology was consulted. Patient had a renal ultrasound that was negative for hydronephrosis. She was put on iv fluids, po biarb. nsaids and iv contrast were avoided. Patient returned back to her baseline creatinine of 1.8-2.0. Will continue with sodium bicarb outpatient and to follow up with nephrology within two weeks.     2. Admitted for DKA most likely secondary to the inciting event of pneumonia. Was initiated on the DKA protocol, resolved with iv fluids and insulin. Free style jade was placed. Insulin requirements were adjusted during admission. Patient will follow up with pcp for further management.     3. CAP of the right middle lobe per cxr on 4/5. Patient was put on clindamycin and completed a regimen of eight days. Patient is at her baseline respiratory status. Will follow up with pcp outpatient within one week.     4. Patient developed dysphagia and chest pain  During admission. GI was consulted. Patient was continued on her home dose of ppi. She had ane egd done on 4/12 which showed moderate to severe monilial eosphagitis. Patient will be sent home on protonix, carafate, and to follow up with gi within one week.      5. Patient came in with a slightly displaced wrist fracture. She will follow up with orthopedic outpatient for management of this.     6. She was continued on her home medications and tolerated them well. She worked with PT/OT during her admission; whom recommended home health.     DISCHARGE CONDITION:   Stable     DISPOSITION:  Home-Health Care [facility]    DISCHARGE MEDICATIONS     Discharge Medications      New Medications      Instructions Start Date   FreeStyle Jade 2 [place] device  Notes to patient: Use as directed   1 each, Does not apply, Continuous      sucralfate 1 g tablet  Commonly known as: CARAFATE   1 g, Oral, 4 Times Daily         Changes to Medications      Instructions Start Date   Lantus SoloStar 100 UNIT/ML injection pen  Generic drug: Insulin  Glargine  What changed: how much to take  Notes to patient: Use as directed   Inject 30 Units under the skin into the appropriate area as directed Every 12 (Twelve) Hours **100 day supply**      metoprolol tartrate 100 MG tablet  Commonly known as: LOPRESSOR  What changed: when to take this   100 mg, Oral, Every 12 Hours Scheduled      NovoLOG FlexPen 100 UNIT/ML solution pen-injector sc pen  Generic drug: insulin aspart  What changed: how much to take  Notes to patient: Use as directed   20 Units, Subcutaneous, 3 Times Daily With Meals      sodium bicarbonate 650 MG tablet  What changed:   · medication strength  · how much to take   650 mg, Oral, 2 Times Daily         Continue These Medications      Instructions Start Date   albuterol sulfate  (90 Base) MCG/ACT inhaler  Commonly known as: PROVENTIL HFA;VENTOLIN HFA;PROAIR HFA   2 puffs, Inhalation, Every 4 Hours PRN      amoxicillin-clavulanate 875-125 MG per tablet  Commonly known as: Augmentin   1 tablet, Oral, Daily      atorvastatin 40 MG tablet  Commonly known as: LIPITOR   40 mg, Oral, Daily      azithromycin 250 MG tablet  Commonly known as: ZITHROMAX  Notes to patient: Take as directed   No dose, route, or frequency recorded.      cetirizine 10 MG tablet  Commonly known as: zyrTEC   10 mg, Oral, Daily      clopidogrel 75 MG tablet  Commonly known as: PLAVIX   75 mg, Oral, Daily      [employer] Blood Glucose Meter w/Device kit  Notes to patient: Use as directed   1 each, Does not apply, 3 Times Daily      cyclobenzaprine 10 MG tablet  Commonly known as: FLEXERIL   10 mg, Oral, 2 Times Daily PRN      DULoxetine 20 MG capsule  Commonly known as: Cymbalta   20 mg, Oral, Daily      Easy Touch Pen Needles 31G X 8 MM misc  Generic drug: Insulin Pen Needle  Notes to patient: Use as directed   No dose, route, or frequency recorded.      NovoFine 30G X 8 MM misc  Generic drug: Insulin Pen Needle  Notes to patient: Use as directed   As directed 4 times daily       ferrous sulfate 325 (65 FE) MG tablet  Commonly known as: FeroSul   325 mg, Oral, Daily With Breakfast      FreeStyle Jade 2 Sensor misc  Notes to patient: Use as directed   1 each, Does not apply, Every 14 Days      gabapentin 800 MG tablet  Commonly known as: NEURONTIN   800 mg, Oral, 3 Times Daily      glucose blood test strip  Notes to patient: Use as directed   Use as instructed      isosorbide mononitrate 30 MG 24 hr tablet  Commonly known as: IMDUR   30 mg, Oral, Daily      Jardiance 25 MG tablet  Generic drug: Empagliflozin   25 mg, Oral, Daily      lidocaine 5 %  Commonly known as: LIDODERM  Notes to patient: Apply as directed   PLACE 1 PATCH ON THE SKIN AS DIRECTED BY PROVIDER DAILY. REMOVE & DISCARD PATCH WITHIN 12 HOURS OR AS DIRECTED BY MD      lisinopril 10 MG tablet  Commonly known as: PRINIVIL,ZESTRIL   10 mg, Oral, Daily      montelukast 10 MG tablet  Commonly known as: SINGULAIR   10 mg, Oral, Nightly      nitroglycerin 0.4 MG SL tablet  Commonly known as: NITROSTAT   0.4 mg, Sublingual, As Needed, Take no more than 3 doses in 15 minutes.       nystatin 059095 UNIT/GM powder  Commonly known as: MYCOSTATIN   Topical, 3 Times Daily      ondansetron ODT 4 MG disintegrating tablet  Commonly known as: Zofran ODT   4 mg, Translingual, Every 8 Hours PRN      oxybutynin XL 5 MG 24 hr tablet  Commonly known as: DITROPAN-XL   5 mg, Oral, Daily      pantoprazole 40 MG EC tablet  Commonly known as: PROTONIX   40 mg, Oral, Nightly      potassium chloride 10 MEQ CR capsule  Commonly known as: MICRO-K   10 mEq, Oral, Daily      promethazine 25 MG tablet  Commonly known as: PHENERGAN   25 mg, Oral, Every 6 Hours PRN         Stop These Medications    methylPREDNISolone 4 MG dose pack  Commonly known as: MEDROL            INSTRUCTIONS:  Activity:   Activity Instructions     Activity as Tolerated          Diet:   Diet Instructions     Diet: Consistent Carbohydrate, Cardiac, Renal      Discharge Diet:  Consistent  Carbohydrate  Cardiac  Renal             FOLLOW UP:   Additional Instructions for the Follow-ups that You Need to Schedule     Ambulatory Referral to Home Health   As directed      Face to Face Visit Date: 4/12/2021    Follow-up provider for Plan of Care?: I treated the patient in an acute care facility and will not continue treatment after discharge.    Follow-up provider: NIRMAL LOPEZ [[id number]]    Reason/Clinical Findings: physcial deconditioing    Describe mobility limitations that make leaving home difficult: Physical deocnditioining wrist fracture    Nursing/Therapeutic Services Requested: Skilled Nursing Physical Therapy Occupational Therapy    Skilled nursing orders: Medication education    PT orders: Therapeutic exercise (WBAT Wrist fracure ok'ed by Orhto) Gait Training Transfer training    Weight Bearing Status: As Tolerated    Occupational orders: Activities of daily living Energy conservation Strengthening Fine motor Home safety assessment    Frequency: 1 Week 1         Call MD With Problems / Concerns   As directed      Instructions: Trouble swallowing elevated sugar    Order Comments: Instructions: Trouble swallowing elevated sugar          Discharge Follow-up with PCP   As directed       Currently Documented PCP:    Nirmal Lopez MD    PCP Phone Number:    [phone]     Follow Up Details: 1 week         Discharge Follow-up with Specified Provider: [facility]   As directed      To: Dr. [facility]    Follow Up Details: Wrist fracture healing with physical therapy at home         Discharge Follow-up with Specified Provider: Dr. Duarte; 1 Week   As directed      To: Dr. Duarte    Follow Up: 1 Week    Follow Up Details: Monilial Esophagitis           Follow-up Information     Ace Lauren MD Follow up in 3 week(s).    Specialty: Nephrology  Why: OFFICE WILL CALL TO SCHEDULE HOSPITAL FOLLOW UP APPOINTMENT  Contact  information:  [address] [id number] [phone]             Nirmal Calvillo MD Follow up.    Specialties: Family Medicine, Emergency Medicine  Why: OFFICE WILL CALL TO SCHEDULE HOSPITAL FOLLOW UP APPOINMENT  Contact information:  [address] [id number] [phone]             Mingo Duarte MD Follow up.    Specialty: Gastroenterology  Why: OFFICE WILL CALL TO SCHEDULE HOSPITAL FOLLOW UP APPOINTMENT  Contact information:  [address] [place] [address] [address]  [place] [id number] [phone]             Ari Nava MD PhD Follow up.    Specialty: Orthopedic Surgery  Why: OFFICE WILL CALL TO SCHEDULE HOSPITAL FOLLOW UP APPOINTMENT  Contact information:  [address] [id number] [phone]             Nirmal Calvillo MD .    Specialties: Family Medicine, Emergency Medicine  Contact information:  [address] [id number] [phone]                   PENDING TEST RESULTS AT DISCHARGE  Pending Labs     Order Current Status    Tissue Pathology Exam In process          Time: >30 minutes were spent in discharge planning, medication reconciliation and coordination of care for this patient.    Dr. Martinez is the attending at time of discharge, She is aware of the patient's status and agrees with the above discharge summary.         Mone Us M.D. PGY3  [facility]  [address] [place] [address]  Office: [phone]    This document has been electronically signed by Mone Us MD on April 13, 2021 17:42 CDT

## 2021-04-12 NOTE — PROGRESS NOTES
SUBJECTIVE:   4/11/2021  Chief Complaint:     Subjective      Patient had chest pain and dysphagia earlier today.  Feels better currently.  Denied abdominal pain, nausea or vomiting.  Tolerating diet well.  Awake and alert.  Serum sodium is 140.  Hemoglobin is 10.8.    History:  Past Medical History:   Diagnosis Date   • Anxiety    • Carotid artery stenosis    • Chronic obstructive lung disease (CMS/[facility])    • CKD (chronic kidney disease) stage 4, GFR 15-29 ml/min (CMS/[facility])    • Colonic polyp    • Coronary arteriosclerosis    • Diabetes mellitus (CMS/[facility])    • Diabetic neuropathy (CMS/[facility])    • GERD (gastroesophageal reflux disease)    • Hypercholesterolemia    • Hypertension    • Morbid obesity (CMS/[facility])    • Nephrolithiasis    • Peripheral vascular disease (CMS/[facility])    • Sleep apnea    • Substance abuse (CMS/[facility])    • Vitamin D deficiency      Past Surgical History:   Procedure Laterality Date   • CARDIAC CATHETERIZATION N/A 7/14/2020   • CAROTID STENT Left    • COLONOSCOPY     • CORONARY ARTERY BYPASS GRAFT     • CYSTOSCOPY BLADDER STONE LITHOTRIPSY Bilateral      Family History   Problem Relation Age of Onset   • Heart disease Mother    • Heart disease Father    • Heart disease Sister    • Heart disease Brother      Social History     Tobacco Use   • Smoking status: Light Tobacco Smoker     Packs/day: 0.25     Years: 46.00     Pack years: 11.50     Types: Cigarettes   • Smokeless tobacco: Never Used   • Tobacco comment: only smoking 5 a day    Substance Use Topics   • Alcohol use: No   • Drug use: Not Currently     Types: LSD, Marijuana, Methamphetamines     Medications Prior to Admission   Medication Sig Dispense Refill Last Dose   • albuterol sulfate  (90 Base) MCG/ACT inhaler Inhale 2 puffs Every 4 (Four) Hours As Needed for Wheezing. 18 g 1    • amoxicillin-clavulanate (Augmentin) 875-125 MG per tablet Take 1 tablet by mouth Daily. 10 tablet 0    • atorvastatin (LIPITOR) 40 MG tablet Take 1  tablet by mouth Daily. 90 tablet 0    • azithromycin (ZITHROMAX) 250 MG tablet       • Blood Glucose Monitoring Suppl ([employer] Blood Glucose Meter) w/Device kit 1 each 3 (Three) Times a Day. 1 kit 3    • cetirizine (zyrTEC) 10 MG tablet Take 1 tablet by mouth Daily. 30 tablet 3    • clopidogrel (PLAVIX) 75 MG tablet Take 1 tablet by mouth Daily. 30 tablet 5    • Continuous Blood Gluc  (FreeStyle Jade 2 [place]) device 1 each Continuous. 1 each 0    • Continuous Blood Gluc Sensor (FreeStyle Jade 2 Sensor) misc 1 each Every 14 (Fourteen) Days. 2 each 11    • cyclobenzaprine (FLEXERIL) 10 MG tablet Take 1 tablet by mouth 2 (Two) Times a Day As Needed for Muscle Spasms. 60 tablet 5    • DULoxetine (Cymbalta) 20 MG capsule Take 1 capsule by mouth Daily. 30 capsule 0    • EASY TOUCH PEN NEEDLES 31G X 8 MM misc       • Empagliflozin (Jardiance) 25 MG tablet Take 25 mg by mouth Daily. 90 tablet 5    • ferrous sulfate (FeroSul) 325 (65 FE) MG tablet Take 1 tablet by mouth Daily With Breakfast. 30 tablet 3    • gabapentin (NEURONTIN) 800 MG tablet Take 1 tablet by mouth 3 (Three) Times a Day. 90 tablet 0    • glucose blood test strip Use as instructed 100 each 12    • insulin aspart (novoLOG FLEXPEN) 100 UNIT/ML solution pen-injector sc pen Inject 5 Units under the skin into the appropriate area as directed 3 (Three) Times a Day With Meals. 1 pen 1    • Insulin Glargine (Lantus SoloStar) 100 UNIT/ML injection pen Inject 100 Units under the skin into the appropriate area as directed Every 12 (Twelve) Hours. 60 mL 11    • Insulin Pen Needle (NovoFine) 30G X 8 MM misc As directed 4 times daily 100 each 11    • isosorbide mononitrate (IMDUR) 30 MG 24 hr tablet Take 1 tablet by mouth Daily. 90 tablet 2    • lidocaine (LIDODERM) 5 % PLACE 1 PATCH ON THE SKIN AS DIRECTED BY PROVIDER DAILY. REMOVE & DISCARD PATCH WITHIN 12 HOURS OR AS DIRECTED BY MD 30 patch 3    • lisinopril (PRINIVIL,ZESTRIL) 10 MG tablet Take 1 tablet by  mouth Daily. 30 tablet 5    • methylPREDNISolone (MEDROL) 4 MG dose pack       • metoprolol tartrate (LOPRESSOR) 100 MG tablet Take 1 tablet by mouth Every 12 (Twelve) Hours for 30 days. (Patient taking differently: Take 100 mg by mouth 2 (Two) Times a Day.) 60 tablet 0    • montelukast (SINGULAIR) 10 MG tablet Take 1 tablet by mouth Every Night. 30 tablet 5    • nitroglycerin (NITROSTAT) 0.4 MG SL tablet Place 1 tablet under the tongue As Needed for Chest Pain. Take no more than 3 doses in 15 minutes. 30 tablet 3    • nystatin (MYCOSTATIN) 310141 UNIT/GM powder Apply  topically to the appropriate area as directed 3 (Three) Times a Day. 15 g 1    • ondansetron ODT (Zofran ODT) 4 MG disintegrating tablet Place 1 tablet on the tongue Every 8 (Eight) Hours As Needed for Nausea or Vomiting. 30 tablet 0    • oxybutynin XL (DITROPAN-XL) 5 MG 24 hr tablet Take 1 tablet by mouth Daily. 90 tablet 1    • pantoprazole (PROTONIX) 40 MG EC tablet Take 1 tablet by mouth Every Night. 30 tablet 5    • potassium chloride (MICRO-K) 10 MEQ CR capsule Take 1 capsule by mouth Daily. 30 capsule 5    • promethazine (PHENERGAN) 25 MG tablet Take 1 tablet by mouth Every 6 (Six) Hours As Needed for Nausea or Vomiting. 30 tablet 0    • sodium bicarbonate 325 MG tablet Take 1 tablet by mouth 2 (Two) Times a Day. 60 tablet 5      Allergies:  Adhesive tape and Other     CURRENT MEDICATIONS/OBJECTIVE/VS/PE:     Current Medications:     Current Facility-Administered Medications   Medication Dose Route Frequency Provider Last Rate Last Admin   • acetaminophen (TYLENOL) tablet 650 mg  650 mg Oral Q6H PRN Huy Santa MD   650 mg at 04/09/21 0606   • albuterol (PROVENTIL) nebulizer solution 0.083% 2.5 mg/3mL  2.5 mg Nebulization Q4H PRN Huy Santa MD       • atorvastatin (LIPITOR) tablet 40 mg  40 mg Oral Daily Huy Santa MD   40 mg at 04/11/21 0810   • calcium carbonate (TUMS) chewable tablet 500 mg (200 mg elemental)  2 tablet Oral BID  PRN Nirmal Calvillo MD   2 tablet at 04/11/21 0638   • clindamycin (CLEOCIN) capsule 600 mg  600 mg Oral Q8H Nirmal Calvillo MD   600 mg at 04/11/21 2040   • clopidogrel (PLAVIX) tablet 75 mg  75 mg Oral Daily Huy Santa MD   75 mg at 04/11/21 0810   • cyclobenzaprine (FLEXERIL) tablet 10 mg  10 mg Oral BID PRN Huy Santa MD   10 mg at 04/05/21 0003   • dextrose (D50W) 25 g/ 50mL Intravenous Solution 25 g  25 g Intravenous Q15 Min PRN Huy Santa MD       • dextrose (GLUTOSE) oral gel 15 g  15 g Oral Q15 Min PRN Huy Santa MD       • gabapentin (NEURONTIN) capsule 400 mg  400 mg Oral TID Umesh Giraldo III, MD   400 mg at 04/11/21 2040   • glucagon (human recombinant) (GLUCAGEN DIAGNOSTIC) injection 1 mg  1 mg Subcutaneous Q15 Min PRN Huy Santa MD       • heparin (porcine) 5000 UNIT/ML injection 5,000 Units  5,000 Units Subcutaneous Q8H Huy Santa MD   5,000 Units at 04/11/21 2040   • insulin aspart (novoLOG) injection 0-24 Units  0-24 Units Subcutaneous TID AC Haily Mcneil MD   8 Units at 04/11/21 1758   • insulin aspart (novoLOG) injection 20 Units  20 Units Subcutaneous TID With Meals Nirmal Calvillo MD   20 Units at 04/11/21 1759   • insulin detemir (LEVEMIR) injection 30 Units  30 Units Subcutaneous Q12H Umesh Giraldo III, MD   30 Units at 04/11/21 2041   • ipratropium-albuterol (DUO-NEB) nebulizer solution 3 mL  3 mL Nebulization 4x Daily - RT Huy Santa MD   3 mL at 04/11/21 1145   • isosorbide mononitrate (IMDUR) 24 hr tablet 30 mg  30 mg Oral Daily Huy Santa MD   30 mg at 04/11/21 0820   • metoprolol tartrate (LOPRESSOR) tablet 100 mg  100 mg Oral Q12H Huy Santa MD   100 mg at 04/11/21 2040   • montelukast (SINGULAIR) tablet 10 mg  10 mg Oral Nightly Huy Santa MD   10 mg at 04/11/21 2040   • nitroglycerin (NITROSTAT) SL tablet 0.4 mg  0.4 mg Sublingual PRN Huy Santa MD       • nystatin (MYCOSTATIN) powder   Topical TID  Huy Santa MD   Given at 04/11/21 0811   • ondansetron ODT (ZOFRAN-ODT) disintegrating tablet 4 mg  4 mg Oral Q8H PRN Huy Santa MD   4 mg at 04/08/21 2243   • oxybutynin XL (DITROPAN-XL) 24 hr tablet 5 mg  5 mg Oral Daily Huy Santa MD   5 mg at 04/11/21 0810   • pantoprazole (PROTONIX) EC tablet 40 mg  40 mg Oral Nightly Huy Santa MD   40 mg at 04/11/21 2040   • potassium chloride (MICRO-K) CR capsule 10 mEq  10 mEq Oral Daily Huy Santa MD   10 mEq at 04/11/21 0810   • promethazine (PHENERGAN) tablet 25 mg  25 mg Oral Q6H PRN Huy Santa MD       • sodium bicarbonate tablet 1,300 mg  1,300 mg Oral BID Janice Chavez APRN   1,300 mg at 04/11/21 2040   • sodium chloride 0.9 % flush 10 mL  10 mL Intravenous PRN Huy Santa MD       • sodium chloride 0.9 % flush 10 mL  10 mL Intravenous PRN Huy Santa MD       • sodium chloride 0.9 % flush 10 mL  10 mL Intravenous Q12H Huy Santa MD   10 mL at 04/10/21 0853   • sodium chloride 0.9 % flush 10 mL  10 mL Intravenous PRN Huy Santa MD       • sodium chloride 0.9 % flush 10 mL  10 mL Intravenous Q12H Huy Santa MD   10 mL at 04/11/21 0811   • sodium chloride 0.9 % flush 10 mL  10 mL Intravenous Q12H Huy Santa MD   10 mL at 04/11/21 0811   • sodium chloride 0.9 % flush 10 mL  10 mL Intravenous PRN Huy Santa MD       • traMADol (ULTRAM) tablet 50 mg  50 mg Oral Q8H PRN Nirmal Calvillo MD   50 mg at 04/11/21 1551       Objective     Review of Systems:   Review of Systems    Physical Exam:   Temp:  [96.7 °F (35.9 °C)-98.1 °F (36.7 °C)] 96.7 °F (35.9 °C)  Heart Rate:  [61-73] 73  Resp:  [18] 18  BP: (134-169)/(68-98) 140/73     Physical Exam:  General Appearance:    Alert, cooperative, in no acute distress   Head:    Normocephalic, without obvious abnormality, atraumatic   Eyes:            Lids and lashes normal, conjunctivae and sclerae normal, no   icterus, no pallor, corneas clear, PERRLA   Ears:    Ears  appear intact with no abnormalities noted   Throat:   No oral lesions, no thrush, oral mucosa moist   Neck:   No adenopathy, supple, trachea midline, no thyromegaly, no     carotid bruit, no JVD   Back:     No kyphosis present, no scoliosis present, no skin lesions,       erythema or scars, no tenderness to percussion or                   palpation,   range of motion normal   Lungs:     Clear to auscultation,respirations regular, even and                   unlabored    Heart:    Regular rhythm and normal rate, normal S1 and S2, no            murmur, no gallop, no rub, no click   Breast Exam:    Deferred   Abdomen:     Normal bowel sounds, no masses, no organomegaly, soft        nontender, nondistended, no guarding, no rebound                 tenderness   Genitalia:    Deferred   Extremities:   Moves all extremities well, no edema, no cyanosis, no              redness   Pulses:   Pulses palpable and equal bilaterally   Skin:   No bleeding, bruising or rash   Lymph nodes:   No palpable adenopathy   Neurologic:   Cranial nerves 2 - 12 grossly intact, sensation intact, DTR        present and equal bilaterally      Results Review:     Lab Results (last 24 hours)     Procedure Component Value Units Date/Time    Gamma GT [[id number]]  (Abnormal) Collected: 04/11/21 1349    Specimen: Blood Updated: 04/11/21 2154      U/L     COVID-19, [facility] IN-HOUSE, NP SWAB IN TRANSPORT MEDIA 8-10 HR TAT - Swab, Nasopharynx [[id number]] Collected: 04/11/21 1800    Specimen: Swab from Nasopharynx Updated: 04/11/21 1822    POC Glucose Once [[id number]]  (Abnormal) Collected: 04/11/21 1654    Specimen: Blood Updated: 04/11/21 1724     Glucose 238 mg/dL      Comment: RN NotifiedOperator: [id number] [place] NOMeter ID: [id number]       Extra Tubes [[id number]] Collected: 04/11/21 1449    Specimen: Blood, Venous Line Updated: 04/11/21 1600    Narrative:      The following orders were created for panel order Extra Tubes.  Procedure                                Abnormality         Status                     ---------                               -----------         ------                     Lavender Top[[id number]]                                     Final result                 Please view results for these tests on the individual orders.    Lavender Top [[id number]] Collected: 04/11/21 1449    Specimen: Blood Updated: 04/11/21 1600     Extra Tube hold for add-on     Comment: Auto resulted       POC Glucose Once [[id number]]  (Abnormal) Collected: 04/11/21 1011    Specimen: Blood Updated: 04/11/21 1027     Glucose 216 mg/dL      Comment: RN NotifiedOperator: [id number] CHRISTIE NOAHMeter ID: [id number]       Comprehensive Metabolic Panel [[id number]]  (Abnormal) Collected: 04/11/21 0617    Specimen: Blood Updated: 04/11/21 0709     Glucose 67 mg/dL      BUN 28 mg/dL      Creatinine 1.62 mg/dL      Sodium 140 mmol/L      Potassium 4.8 mmol/L      Comment: Slight hemolysis detected by analyzer. Results may be affected.        Chloride 106 mmol/L      CO2 21.0 mmol/L      Calcium 9.5 mg/dL      Total Protein 8.2 g/dL      Albumin 2.80 g/dL      ALT (SGPT) 15 U/L      AST (SGOT) 29 U/L      Alkaline Phosphatase 199 U/L      Total Bilirubin 0.2 mg/dL      eGFR Non African Amer 32 mL/min/1.73      Globulin 5.4 gm/dL      A/G Ratio 0.5 g/dL      BUN/Creatinine Ratio 17.3     Anion Gap 13.0 mmol/L     Narrative:      GFR Normal >60  Chronic Kidney Disease <60  Kidney Failure <15      POC Glucose Once [[id number]]  (Abnormal) Collected: 04/11/21 0643    Specimen: Blood Updated: 04/11/21 0709     Glucose 67 mg/dL      Comment: : [id number] [place] ELRITAMeter ID: [id number]       CBC & Differential [[id number]]  (Abnormal) Collected: 04/11/21 0617    Specimen: Blood Updated: 04/11/21 0652    Narrative:      The following orders were created for panel order CBC & Differential.  Procedure                               Abnormality         Status                      ---------                               -----------         ------                     CBC Auto Differential[[id number]]        Abnormal            Final result                 Please view results for these tests on the individual orders.    CBC Auto Differential [[id number]]  (Abnormal) Collected: 04/11/21 0617    Specimen: Blood Updated: 04/11/21 0652     WBC 13.51 10*3/mm3      RBC 3.78 10*6/mm3      Hemoglobin 10.8 g/dL      Hematocrit 33.8 %      MCV 89.4 fL      MCH 28.6 pg      MCHC 32.0 g/dL      RDW 15.7 %      RDW-SD 50.8 fl      MPV 8.9 fL      Platelets 404 10*3/mm3      Neutrophil % 56.5 %      Lymphocyte % 31.5 %      Monocyte % 6.4 %      Eosinophil % 3.3 %      Basophil % 1.0 %      Immature Grans % 1.3 %      Neutrophils, Absolute 7.64 10*3/mm3      Lymphocytes, Absolute 4.26 10*3/mm3      Monocytes, Absolute 0.86 10*3/mm3      Eosinophils, Absolute 0.44 10*3/mm3      Basophils, Absolute 0.13 10*3/mm3      Immature Grans, Absolute 0.18 10*3/mm3      nRBC 0.0 /100 WBC            I reviewed the patient's new clinical results.  I reviewed the patient's new imaging results and agree with the interpretation.     ASSESSMENT/PLAN:   ASSESSMENT:  1.  Dysphagia and chest pain, rule out stricture, ring and CA.  Could also be due to dysmotility.  2.  GERD, well controlled with PPI.  3.  Acute renal insufficiency, improving.  4.  Change in mental status, improving.  5.  Hyponatremia, improving.    PLAN:  1.  Continue PPI  2.  Soft diet  3.  EGD in AM for possible dilatation.  4.  D/C p.o. fluid restriction  5.  Continue current supportive care  The risks, benefits, and alternatives of this procedure have been discussed with the patient or the responsible party- the patient understands and agrees to proceed.         Mingo Duarte MD  04/11/21  22:48 CDT

## 2021-04-12 NOTE — PROGRESS NOTES
FAMILY MEDICINE DAILY PROGRESS NOTE    NAME: Yamileth Slater  : 1960  MRN: [id number]      LOS: 12 days     PROVIDER OF SERVICE: Huy Santa MD    Chief Complaint: Type 2 diabetes mellitus with hyperglycemia, with long-term current use of insulin (CMS/[facility])    Subjective:     Interval History:  History taken from: patient chart    This morning the patient reports epigastric pain that she rates 8/10.  She also is experiencing some nausea.  Her shortness of breath has improved however she complains of some mild wheezing.  Telemetry did not report any overnight events.  Her vital signs are within normal limits.  We are waiting on results of the upcoming nuclear emptying study that is going to be done today to see if we can identify the source of her incomplete stomach emptying..  She is currently n.p.o.    Review of Systems:   Review of Systems   Constitutional: Negative for chills and fever.   Respiratory: Positive for wheezing. Negative for shortness of breath.    Cardiovascular: Negative for chest pain and palpitations.   Gastrointestinal: Positive for abdominal pain. Negative for constipation, diarrhea, nausea and vomiting.   Genitourinary: Negative for hematuria.   Neurological: Negative for syncope and light-headedness.       Objective:     Vital Signs  Temp:  [96.7 °F (35.9 °C)-97 °F (36.1 °C)] 97 °F (36.1 °C)  Heart Rate:  [66-74] 74  Resp:  [16-20] 20  BP: (121-145)/(59-79) 121/59   Body mass index is 50.48 kg/m².    Physical Exam  Physical Exam  Vitals and nursing note reviewed.   Constitutional:       Appearance: She is obese. She is not diaphoretic.   Cardiovascular:      Rate and Rhythm: Normal rate and regular rhythm.      Pulses: Normal pulses.   Pulmonary:      Effort: Pulmonary effort is normal. No respiratory distress.      Breath sounds: No wheezing.      Comments: The wheezing which we had noted on previous examinations was not appreciated today on lung examination.  Chest:       Chest wall: No tenderness.   Abdominal:      Tenderness: There is abdominal tenderness.      Comments: Palpatory tenderness is evident midepigastrium.   Neurological:      Mental Status: Mental status is at baseline.   Psychiatric:         Mood and Affect: Mood normal.         Scheduled Meds   atorvastatin, 40 mg, Oral, Daily  clindamycin, 600 mg, Oral, Q8H  clopidogrel, 75 mg, Oral, Daily  gabapentin, 400 mg, Oral, TID  heparin (porcine), 5,000 Units, Subcutaneous, Q8H  insulin aspart, 0-24 Units, Subcutaneous, TID AC  insulin aspart, 20 Units, Subcutaneous, TID With Meals  insulin detemir, 30 Units, Subcutaneous, Q12H  ipratropium-albuterol, 3 mL, Nebulization, 4x Daily - RT  isosorbide mononitrate, 30 mg, Oral, Daily  metoprolol tartrate, 100 mg, Oral, Q12H  montelukast, 10 mg, Oral, Nightly  nystatin, , Topical, TID  oxybutynin XL, 5 mg, Oral, Daily  pantoprazole, 40 mg, Oral, Nightly  potassium chloride, 10 mEq, Oral, Daily  sodium bicarbonate, 1,300 mg, Oral, BID  sodium chloride, 10 mL, Intravenous, Q12H  sodium chloride, 10 mL, Intravenous, Q12H  sodium chloride, 10 mL, Intravenous, Q12H  sucralfate, 1 g, Oral, 4x Daily AC & at Bedtime       PRN Meds   •  acetaminophen  •  albuterol  •  calcium carbonate  •  cyclobenzaprine  •  dextrose  •  dextrose  •  glucagon (human recombinant)  •  nitroglycerin  •  ondansetron ODT  •  promethazine  •  sodium chloride  •  [COMPLETED] Insert peripheral IV **AND** sodium chloride  •  sodium chloride  •  sodium chloride  •  traMADol      Diagnostic Data    Results from last 7 days   Lab Units 04/12/21  0541   WBC 10*3/mm3 10.58   HEMOGLOBIN g/dL 9.9*   HEMATOCRIT % 30.7*   PLATELETS 10*3/mm3 374   GLUCOSE mg/dL 137*   CREATININE mg/dL 1.96*   BUN mg/dL 32*   SODIUM mmol/L 133*   POTASSIUM mmol/L 4.4   AST (SGOT) U/L 18   ALT (SGPT) U/L 12   ALK PHOS U/L 190*   BILIRUBIN mg/dL 0.2   ANION GAP mmol/L 6.0       No radiology results for the last day      I reviewed the  patient's new clinical results.    Assessment/Plan:     Active Hospital Problems    Diagnosis  POA   • **Type 2 diabetes mellitus with hyperglycemia, with long-term current use of insulin (CMS/[facility]) [E11.65, Z79.4]  Not Applicable     Priority: High     -Glucose monitoring  -Levemir 35u bid, Aspart 20 units w/meals, ISS   +32 units SSI in last 24 hours  -Consistent carb diet limited to less than 45 g of carbohydrates per meal.  -Patient attempting to cheat on diet by ordering cheeseburger and fries from nutrition services.  Nurse aware    -Patient with incomplete emptying of stomach.  -Gastric emptying study done today and attempt to elucidate the cause.  -Possibly due to gastroparesis secondary to uncontrolled diabetes.     • Community acquired pneumonia of right middle lobe of lung [J18.9]  Yes     Priority: High     - Confirmed on CXR. CXR 4/5 unremarkable   -Aspiration pneumonitis now suspected.  Patient MRSA positive in nares.  -IV clindamycin 300 mg 3 times daily finish out 10-day course of antibiotic treatment. Started 4/3/21. Switched to Clindamycin 600mg q8h 4/5/21  -WBCs today 10.6 which represents a slight decrease since her last measurement..  Patient afebrile.  -While patient complains of some wheezing we were unable to appreciate any on today's lung examination.     • Acute renal failure superimposed on chronic kidney disease (CMS/[facility]) [N17.9, N18.9]  Yes     Priority: High     -Hold nephrotoxic drugs  -Nephrology consulted and appreciate recommendations  -Improving creatinine today 1.96 which represents a slight elevation from last measurement.  -Baseline: 1.8-2  -Continue IV normal saline solution 125 an hour.       • Alkaline phosphatase elevation [R74.8]  Unknown     Priority: Medium     Alkaline phosphatase level elevated to 199.  -GGT ordered to differentiate source  -     • COVID-19 ruled out by laboratory testing [Z20.822]  Unknown   • Esophageal dysphagia [R13.10]  Unknown     Added  automatically from request for surgery [id number]     • MRSA infection [A49.02]  Yes   • Cutaneous candidiasis [B37.2]  Yes     - Nystatin     • Hyponatremia [E87.1]  Yes     -Normal once corrected for hyperglycemia     • Anemia [D64.9]  Yes     -Iron studies indicative of mixed iron deficiency and chronic disease  -Continue home ferrous sulfate       • Chronic midline low back pain without sciatica [M54.5, G89.29]  Yes     - Tramadol PRN     • Chronic obstructive lung disease (CMS/HCC) [J44.9]  Yes     -O2 supplementation  -Home inhalers as needed  -DuoNebs  -CPAP at night     • GERD (gastroesophageal reflux disease) [K21.9]  Yes     - IV protonix qday  - Tums     • Coronary artery disease [I25.10]  Yes     -Continue home meds     • Current smoker [F17.200]  Yes     -Patch         DVT prophylaxis: Heparin  Code status is   Code Status and Medical Interventions:   Ordered at: 03/31/21 1811     Code Status:    CPR     Medical Interventions (Level of Support Prior to Arrest):    Full       Plan for disposition:Where: home      Time: More than 50% of time spent in counseling and coordination of care:  Total face-to-face/floor time 25 min.  Time spent in counseling 5 min. Counseling included the following topics: The upcoming gastric emptying study and its importance towards formulating a plan of care for her apparent stomach issues.        This document has been electronically signed by Huy Santa MD on April 12, 2021 13:34 CDT

## 2021-04-12 NOTE — PLAN OF CARE
Goal Outcome Evaluation:  Plan of Care Reviewed With: patient     Outcome Summary: Kuldeep LOPEZ'marissa tx, pt completed grooming in chair at sink w/o assist. amb to recliner w/ supervision, sit<>stand supervision, completed ue ex w/ 3# wt on l ue and1#  on rt ue alt arms as lopez, cont poc

## 2021-04-12 NOTE — PLAN OF CARE
Goal Outcome Evaluation:  Plan of Care Reviewed With: patient     Outcome Summary: sit-stand-sit sba of 1,amb 62,48 w/o ad and sba/cga of 1;2/20 reps seated ex

## 2021-04-12 NOTE — ANESTHESIA POSTPROCEDURE EVALUATION
Patient: Yamileth Slater    Procedure Summary     Date: 04/12/21 Room / Location: [facility] ENDOSCOPY 2 / [facility] ENDOSCOPY    Anesthesia Start: 1128 Anesthesia Stop: 1136    Procedure: ESOPHAGOGASTRODUODENOSCOPY (N/A ) Diagnosis:       Esophageal dysphagia      (Esophageal dysphagia [R13.10])    Surgeons: Mingo Duarte MD Provider: Mamta Gusman CRNA    Anesthesia Type: MAC ASA Status: 4 - Emergent          Anesthesia Type: MAC    Vitals  No vitals data found for the desired time range.          Post Anesthesia Care and Evaluation    Patient location during evaluation: bedside  Patient participation: complete - patient participated  Level of consciousness: sleepy but conscious  Pain score: 0  Pain management: adequate  Airway patency: patent  Anesthetic complications: No anesthetic complications  PONV Status: none  Cardiovascular status: acceptable  Respiratory status: acceptable  Hydration status: acceptable

## 2021-04-12 NOTE — SIGNIFICANT NOTE
04/12/21 1530   OTHER   Discipline physical therapy assistant   Rehab Time/Intention   Session Not Performed patient/family declined treatment  (pt declines rx in pm)

## 2021-04-12 NOTE — THERAPY TREATMENT NOTE
Patient Name: Yamileth Slater  : 1960    MRN: [id number]                              Today's Date: 2021       Admit Date: 3/31/2021    Visit Dx:     ICD-10-CM ICD-9-CM   1. Hyperglycemia  R73.9 790.29   2. Urinary tract infection in female  N39.0 599.0   3. Impaired mobility and ADLs  Z74.09 V49.89    Z78.9    4. Impaired functional mobility, balance, gait, and endurance  Z74.09 V49.89   5. Esophageal dysphagia  R13.10 787.20     Patient Active Problem List   Diagnosis   • Type 2 diabetes mellitus with diabetic polyneuropathy, with long-term current use of insulin (CMS/Formerly [facility])   • Chronic obstructive pulmonary disease (CMS/Formerly [facility])   • Chronic midline low back pain without sciatica   • Vitamin D deficiency   • Type 2 diabetes mellitus (CMS/Formerly [facility])   • Tobacco dependence syndrome   • Surgical follow-up care   • Shoulder joint pain   • Peripheral vascular disease (CMS/[facility])   • Pain   • Neurologic disorder associated with diabetes mellitus (CMS/[facility]ly [facility])   • Morbid obesity with BMI of 50.0-59.9, adult (CMS/Formerly [facility])   • Mixed hyperlipidemia   • Kidney stone   • History of colon polyps   • GERD (gastroesophageal reflux disease)   • Excoriated eczema   • Essential hypertension   • Encounter for medication refill   • Dyslipidemia   • Diabetes mellitus (CMS/[facility])   • Coronary artery disease   • Chronic obstructive lung disease (CMS/[facility])   • Chronic folliculitis   • Chest pain   • Mild persistent asthma   • Anxiety states   • 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   • Uncontrolled type 2 diabetes mellitus with complication, with long-term current use of insulin (CMS/[facility])   • Acute renal failure superimposed on chronic kidney disease (CMS/[facility]ly [facility])   • Anemia   • Hypothyroidism   • Carotid artery disease (CMS/[facility])   • Current smoker   • DM type 2 with diabetic peripheral neuropathy (CMS/[facility])   • [place] abuse   • PAD (peripheral artery disease) (CMS/[facility]ly [facility])   • Pneumonia of both lungs due to infectious organism   •  S/P CABG x 3   • Intercostal pain   • Venous insufficiency   • Dyspnea on exertion   • LAFB (left anterior fascicular block)   • Pulmonary hypertension (CMS/HCC)   • Left hip pain   • Neck pain   • Stage 3b chronic kidney disease (CMS/HCC)   • MIKE and COPD overlap syndrome (CMS/HCC)   • Pneumonia due to COVID-19 virus   • CKD (chronic kidney disease) stage 4, GFR 15-29 ml/min (CMS/HCC)   • Morbid obesity (CMS/HCC)   • Type 2 diabetes mellitus with hyperglycemia, with long-term current use of insulin (CMS/HCC)   • Hyponatremia   • Anemia   • Cutaneous candidiasis   • Community acquired pneumonia of right middle lobe of lung   • MRSA infection   • Alkaline phosphatase elevation   • COVID-19 ruled out by laboratory testing   • Esophageal dysphagia     Past Medical History:   Diagnosis Date   • Anxiety    • Carotid artery stenosis    • Chronic obstructive lung disease (CMS/HCC)    • CKD (chronic kidney disease) stage 4, GFR 15-29 ml/min (CMS/HCC)    • Colonic polyp    • Coronary arteriosclerosis    • Diabetes mellitus (CMS/HCC)    • Diabetic neuropathy (CMS/HCC)    • GERD (gastroesophageal reflux disease)    • Hypercholesterolemia    • Hypertension    • Morbid obesity (CMS/HCC)    • Nephrolithiasis    • Peripheral vascular disease (CMS/HCC)    • Sleep apnea    • Substance abuse (CMS/HCC)    • Vitamin D deficiency      Past Surgical History:   Procedure Laterality Date   • CARDIAC CATHETERIZATION N/A 7/14/2020   • CAROTID STENT Left    • COLONOSCOPY     • CORONARY ARTERY BYPASS GRAFT     • CYSTOSCOPY BLADDER STONE LITHOTRIPSY Bilateral      General Information     Row Name 04/12/21 0755          OT Time and Intention    Document Type  therapy note (daily note)  -RC     Mode of Treatment  individual therapy;occupational therapy  -RC     Row Name 04/12/21 0755          General Information    Patient Profile Reviewed  yes  -RC     Existing Precautions/Restrictions  cardiac;fall  -RC     Row Name 04/12/21 0755           Cognition    Orientation Status (Cognition)  oriented x 4  -     Row Name 04/12/21 0755          Safety Issues, Functional Mobility    Impairments Affecting Function (Mobility)  cognition;balance;coordination;endurance/activity tolerance;sensation/sensory awareness;strength;motor control;motor planning;pain;range of motion (ROM);postural/trunk control  -       User Key  (r) = Recorded By, (t) = Taken By, (c) = Cosigned By    Initials Name Provider Type     Jyoti Jaramillo OLMSTEAD/L Occupational Therapy Assistant          Mobility/ADL's    No documentation.       Obj/Interventions    No documentation.       Goals/Plan     Row Name 04/12/21 0755          Transfer Goal 1 (OT)    Activity/Assistive Device (Transfer Goal 1, OT)  toilet;commode, bedside with drop arms;commode, bedside without drop arms  -RC     [place] Level/Cues Needed (Transfer Goal 1, OT)  minimum assist (75% or more patient effort)  -RC     Time Frame (Transfer Goal 1, OT)  long term goal (LTG);by discharge  -RC     Progress/Outcome (Transfer Goal 1, OT)  goal met  -     Row Name 04/12/21 [facility]5          Bathing Goal 1 (OT)    Activity/Device (Bathing Goal 1, OT)  bathing skills, all  -RC     [place] Level/Cues Needed (Bathing Goal 1, OT)  set-up required;standby assist;verbal cues required  -RC     Time Frame (Bathing Goal 1, OT)  long term goal (LTG);by discharge  -RC     Progress/Outcomes (Bathing Goal 1, OT)  goal not met  -[facility] Name 04/12/21 0755          Dressing Goal 1 (OT)    Activity/Device (Dressing Goal 1, OT)  dressing skills, all  -RC     [place]/Cues Needed (Dressing Goal 1, OT)  set-up required;standby assist;verbal cues required  -RC     Time Frame (Dressing Goal 1, OT)  long term goal (LTG);by discharge  -RC     Progress/Outcome (Dressing Goal 1, OT)  goal not met  -       User Key  (r) = Recorded By, (t) = Taken By, (c) = Cosigned By    Initials Name Provider Type     Jyoti Jaramillo, OLMSTEAD/L  Occupational Therapy Assistant        Clinical Impression    No documentation.       Outcome Measures    No documentation.       Occupational Therapy Education                 Title: PT OT SLP Therapies (In Progress)     Topic: Occupational Therapy (In Progress)     Point: ADL training (In Progress)     Description:   Instruct learner(s) on proper safety adaptation and remediation techniques during self care or transfers.   Instruct in proper use of assistive devices.              Learning Progress Summary           Patient Acceptance, E, NR by  at 4/9/2021 1456    Acceptance, E, NR by  at 4/7/2021 1230    Comment: Educated about OT and POC. Educated to call for assist. Educated on safety throughout.    Acceptance, E, NR by BB at 4/4/2021 1309    Acceptance, E, NR by  at 4/2/2021 1223    Comment: Educated about OT and POC. Educated on safety throughout, educated on precuations with right wrist. educated to call for assist.   Family Acceptance, E, NR by  at 4/2/2021 1223    Comment: Educated about OT and POC. Educated on safety throughout, educated on precuations with right wrist. educated to call for assist.                   Point: Home exercise program (Done)     Description:   Instruct learner(s) on appropriate technique for monitoring, assisting and/or progressing therapeutic exercises/activities.              Learning Progress Summary           Patient Acceptance, E, VU by  at 4/6/2021 1450                   Point: Precautions (In Progress)     Description:   Instruct learner(s) on prescribed precautions during self-care and functional transfers.              Learning Progress Summary           Patient Acceptance, E, NR by  at 4/7/2021 1230    Comment: Educated about OT and POC. Educated to call for assist. Educated on safety throughout.    Acceptance, E, NR by  at 4/2/2021 1223    Comment: Educated about OT and POC. Educated on safety throughout, educated on precuations with right wrist. educated  to call for assist.   Family Acceptance, E, NR by  at 4/2/2021 1223    Comment: Educated about OT and POC. Educated on safety throughout, educated on precuations with right wrist. educated to call for assist.                   Point: Body mechanics (Done)     Description:   Instruct learner(s) on proper positioning and spine alignment during self-care, functional mobility activities and/or exercises.              Learning Progress Summary           Patient Acceptance, E, VU,DU by MICHELLE at 4/10/2021 1456    Comment: Pt edu on OT and POC. Pt edu on proper body mechanics when performing BUE ther ex.    Acceptance, E, NR by BB at 4/9/2021 1456                               User Key     Initials Effective Dates Name Provider Type Discipline     06/08/18 -  Mary Jo Michel, OTR/L Occupational Therapist OT    JO 03/07/18 -  Nissa eBy OLMSTEAD/L Occupational Therapy Assistant OT    MICHELLE 11/05/19 -  Yamilex Jenkins OTA Occupational Therapy Assistant OT              OT Recommendation and Plan     Plan of Care Review  Plan of Care Reviewed With: patient  Outcome Summary: Kuldeep OK'marissa tx, pt completed grooming in chair at sink w/o assist. amb to recliner w/ supervision, sit<>stand supervision, completed ue ex w/ 3# wt on l ue and1#  on rt ue alt arms as lopez, cont poc     Time Calculation:   Time Calculation- OT     Row Name 04/12/21 0837             Time Calculation- OT    OT Start Time  0755  -RC      OT Stop Time  0833  -      OT Time Calculation (min)  38 min  -      Total Timed Code Minutes- OT  38 minute(s)  -      OT Received On  04/12/21  -        User Key  (r) = Recorded By, (t) = Taken By, (c) = Cosigned By    Initials Name Provider Type    RC Jyoti Jaramillo OLMSTEAD/L Occupational Therapy Assistant        Therapy Charges for Today     Code Description Service Date Service Provider Modifiers Qty    94328945148 HC OT SELF CARE/MGMT/TRAIN EA 15 MIN 4/12/2021 Jyoti Jaramillo OLMSTEAD/L GO 1    30919993817 HC OT  THER PROC EA 15 MIN 4/12/2021 Jyoti Jaramillo COTA/AISHWARYA GO 2               ISHAN Bedoya/AISHWARYA  4/12/2021

## 2021-04-12 NOTE — THERAPY TREATMENT NOTE
"Acute Care - Physical Therapy Treatment Note  [facility]     Patient Name: Yamileth Slater  : 1960  MRN: [id number]  Today's Date: 2021           PT Assessment (last 12 hours)      PT Evaluation and Treatment     Row Name 21          Physical Therapy Time and Intention    Subjective Information  complains of;pain pt states she is \"babying\"her r wrist  -LN     Document Type  therapy note (daily note)  -LN     Mode of Treatment  individual therapy;physical therapy  -LN     Patient Effort  good  -LN     Comment  md made aware of intermittent pain        -LN     Row Name 21          General Information    Patient Profile Reviewed  yes  -LN     Existing Precautions/Restrictions  cardiac;fall  -LN     Row Name 21          Sensory    Hearing Status  WFL  -LN     [facility] Name 21          Cognition    Orientation Status (Cognition)  oriented x 4  -LN     [facility] Name 21          Pain Scale: Numbers Pre/Post-Treatment    Pretreatment Pain Rating  9/10  -LN     Posttreatment Pain Rating  7/10  -LN     Pain Location - Side  Right  -LN     Pre/Posttreatment Pain Comment  -- unable to get meds due to test  -LN     [facility] Name 21          Mobility    Extremity Weight-bearing Status  right upper extremity  -LN     Right Upper Extremity (Weight-bearing Status)  -- platform attached R to walker ;  -LN     [facility] Name 21          Transfers    Transfers  sit-stand transfer;stand-sit transfer  -LN     Sit-Stand [place] (Transfers)  standby assist  -LN     Stand-Sit [place] (Transfers)  standby assist  -LN     [place] Level (Toilet Transfer)  not tested  -[facility] Name 21          Toilet Transfer    Type (Toilet Transfer)  sit-stand;stand-sit  -LN     Row Name 21          Gait/Stairs (Locomotion)    Gait/Stairs Locomotion  gait/ambulation independence;gait/ambulation assistive device;distance ambulated;gait " pattern;gait deviations  -LN     [place] Level (Gait)  standby assist;contact guard  -LN     Assistive Device (Gait)  -- none  -LN     Distance in Feet (Gait)  62,48  -LN     Pattern (Gait)  step-to  -LN     Deviations/Abnormal Patterns (Gait)  nasir decreased;base of support, wide  -LN     Row Name 04/12/21 0907          Hip (Therapeutic Exercise)    Hip (Therapeutic Exercise)  AROM (active range of motion)  -LN     Hip AROM (Therapeutic Exercise)  bilateral;flexion;aBduction;aDduction  -LN     Row Name 04/12/21 0907          Knee (Therapeutic Exercise)    Knee (Therapeutic Exercise)  AROM (active range of motion)  -LN     Knee AROM (Therapeutic Exercise)  bilateral;LAQ (long arc quad)  -LN     Row Name 04/12/21 0907          Ankle (Therapeutic Exercise)    Ankle (Therapeutic Exercise)  AROM (active range of motion)  -LN     Ankle AROM (Therapeutic Exercise)  bilateral;dorsiflexion;plantarflexion  -LN     Row Name 04/12/21 0907          Plan of Care Review    Plan of Care Reviewed With  patient  -LN     Outcome Summary  sit-stand-sit sba of 1,amb 62,48 w/o ad and sba/cga of 1;2/20 reps seated ex  -LN     Row Name 04/12/21 0907          Vital Signs    Pre Systolic BP Rehab  144  -LN     Pre Treatment Diastolic BP  63  -LN     Post Systolic BP Rehab  153  -LN     Post Treatment Diastolic BP  70  -LN     Pretreatment Heart Rate (beats/min)  73  -LN     Posttreatment Heart Rate (beats/min)  71  -LN     Pre SpO2 (%)  95  -LN     O2 Delivery Pre Treatment  room air  -LN     Post SpO2 (%)  96  -LN     Pre Patient Position  Sitting  -LN     Intra Patient Position  Standing  -LN     Post Patient Position  Sitting  -LN     Row Name 04/12/21 0907          Bed Mobility Goal 1 (PT)    Activity/Assistive Device (Bed Mobility Goal 1, PT)  bed mobility activities, all  -LN     [place] Level/Cues Needed (Bed Mobility Goal 1, PT)  moderate assist (50-74% patient effort);minimum assist (75% or more patient effort);1  person assist  -LN     Time Frame (Bed Mobility Goal 1, PT)  1 week  -LN     Progress/Outcomes (Bed Mobility Goal 1, PT)  goal partially met;continuing progress toward goal  -LN     Row Name 04/12/21 0907          Transfer Goal 1 (PT)    Activity/Assistive Device (Transfer Goal 1, PT)  bed-to-chair/chair-to-bed  -LN     [place] Level/Cues Needed (Transfer Goal 1, PT)  minimum assist (75% or more patient effort);moderate assist (50-74% patient effort);2 person assist  -LN     Progress/Outcome (Transfer Goal 1, PT)  continuing progress toward goal;goal met  -LN     Row Name 04/12/21 0907          Gait Training Goal 1 (PT)    Activity/Assistive Device (Gait Training Goal 1, PT)  gait (walking locomotion);assistive device use  -LN     [place] Level (Gait Training Goal 1, PT)  moderate assist (50-74% patient effort);minimum assist (75% or more patient effort);2 person assist  -LN     Distance (Gait Training Goal 1, PT)  100 ft or more  -LN     Time Frame (Gait Training Goal 1, PT)  2 weeks  -LN     Progress/Outcome (Gait Training Goal 1, PT)  continuing progress toward goal;goal partially met  -LN     Row Name 04/12/21 0907          ROM Goal 1 (PT)    ROM Goal 1 (PT)  AAROM [facility] activley for hip, knee and ankle flx ex 20 reps x 2  -LN     Time Frame (ROM Goal 1, PT)  4 days  -LN     Progress/Outcome (ROM Goal 1, PT)  goal met  -LN     Row Name 04/12/21 0907          Stairs Goal 1 (PT)    Activity/Assistive Device (Stairs Goal 1, PT)  ascending stairs;descending stairs  -LN     [place] Level/Cues Needed (Stairs Goal 1, PT)  modified independence  -LN     Number of Stairs (Stairs Goal 1, PT)  2 or more as lopez;  -LN     Time Frame (Stairs Goal 1, PT)  long term goal (LTG);3 weeks  -LN     Progress/Outcome (Stairs Goal 1, PT)  goal not met  -LN     Row Name 04/12/21 0907          Positioning and Restraints    Post Treatment Position  chair  -LN     In Chair  notified nsg;sitting;call light within  reach;encouraged to call for assist;with family/caregiver  -LN       User Key  (r) = Recorded By, (t) = Taken By, (c) = Cosigned By    Initials Name Provider Type    Lor Mcgrath PTA Physical Therapy Assistant        Physical Therapy Education                 Title: PT OT SLP Therapies (In Progress)     Topic: Physical Therapy (Done)     Point: Mobility training (Done)     Learning Progress Summary           Patient Acceptance, E,TB, VU by LN at 4/12/2021 1010    Comment: reviewed home safety    Acceptance, D,E, NR,VU,DU by JUANCARLOS at 4/7/2021 1633    Comment: using platform FWRW for mobility;    Acceptance, E,TB, VU,NR,DU by LN at 4/6/2021 1430    Comment: home safety,room safety    Acceptance, D,E, NR by JUANCARLOS at 4/1/2021 1056    Comment: POC: instructions for eval/ex to encourage active  mobility   Family Acceptance, E,TB, VU,NR,DU by ERIN at 4/6/2021 1430    Comment: home safety,room safety                   Point: Home exercise program (Done)     Learning Progress Summary           Patient Acceptance, E,TB, VU by LN at 4/12/2021 1010    Comment: reviewed home safety                   Point: Body mechanics (Done)     Learning Progress Summary           Patient Acceptance, E,TB, VU by LN at 4/12/2021 1010    Comment: reviewed home safety    Acceptance, E,TB, VU,NR,DU by LN at 4/6/2021 1430    Comment: home safety,room safety   Family Acceptance, E,TB, VU,NR,DU by LN at 4/6/2021 1430    Comment: home safety,room safety                   Point: Precautions (Done)     Learning Progress Summary           Patient Acceptance, E,TB, VU by LN at 4/12/2021 1010    Comment: reviewed home safety    Acceptance, D,E, NR,VU,DU by JUANCARLOS at 4/7/2021 1633    Comment: using platform FWRW for mobility;    Acceptance, E,TB, VU,NR,DU by LN at 4/6/2021 1430    Comment: home safety,room safety   Family Acceptance, E,TB, VU,NR,DU by LN at 4/6/2021 1430    Comment: home safety,room safety                               User Key     Initials  Effective Dates Name Provider Type Discipline    GB 04/03/18 -  Gauri Anderson, PT Physical Therapist PT    LN 03/07/18 -  Lor Tavarez PTA Physical Therapy Assistant PT              PT Recommendation and Plan  Anticipated Discharge Disposition (PT): sub acute care setting, long-term acute care facility  Therapy Frequency (PT):  (5-7 d/w)  Plan of Care Reviewed With: patient  Outcome Summary: sit-stand-sit sba of 1,amb 62,48 w/o ad and sba/cga of 1;2/20 reps seated ex       Time Calculation:   PT Charges     Row Name 04/12/21 1008             Time Calculation    Start Time  0907  -LN      Stop Time  1000  -LN      Time Calculation (min)  53 min  -LN      PT Received On  04/12/21  -LN         Time Calculation- PT    Total Timed Code Minutes- PT  53 minute(s)  -LN        User Key  (r) = Recorded By, (t) = Taken By, (c) = Cosigned By    Initials Name Provider Type    LN Lor Tavarez PTA Physical Therapy Assistant        Therapy Charges for Today     Code Description Service Date Service Provider Modifiers Qty    63706628724 HC GAIT TRAINING EA 15 MIN 4/12/2021 Lor Tavarez, PTA GP 1    95795043941 HC PT THER PROC EA 15 MIN 4/12/2021 Lor Tavarez, PTA GP 1    90972747177 HC PT THERAPEUTIC ACT EA 15 MIN 4/12/2021 Lor Tavarez, PTA GP 1    26890287008 HC PT SELF CARE/MGMT/TRAIN EA 15 MIN 4/12/2021 Lor Tavarez, RENETTA GP 1          PT G-Codes  Outcome Measure Options: AM-PAC 6 Clicks Daily Activity (OT)  AM-PAC 6 Clicks Score (PT): 15  AM-PAC 6 Clicks Score (OT): 17    Lor Tavarez PTA  4/12/2021

## 2021-04-13 ENCOUNTER — TRANSITIONAL CARE MANAGEMENT TELEPHONE ENCOUNTER (OUTPATIENT)
Dept: CALL CENTER | Facility: HOSPITAL | Age: 61
End: 2021-04-13

## 2021-04-13 ENCOUNTER — TELEPHONE (OUTPATIENT)
Dept: FAMILY MEDICINE CLINIC | Facility: CLINIC | Age: 61
End: 2021-04-13

## 2021-04-13 LAB — GLUCOSE BLDC GLUCOMTR-MCNC: 444 MG/DL (ref 70–130)

## 2021-04-13 NOTE — OUTREACH NOTE
Prep Survey      Responses   [facility] facility patient discharged from?  [facility]   Is LACE score < 7 ?  No   Emergency Room discharge w/ pulse ox?  No   Eligibility  [facility]   Date of Admission  03/31/21   Date of Discharge  04/12/21   Discharge Disposition  Home-Health Care [facility]   Discharge diagnosis  DM   Does the patient have one of the following disease processes/diagnoses(primary or secondary)?  Other   Does the patient have Home health ordered?  No   Is there a DME ordered?  No   Prep survey completed?  Yes          Marybel Hsu RN

## 2021-04-13 NOTE — TELEPHONE ENCOUNTER
----- Message from Yi Barron sent at 4/12/2021  5:27 PM CDT -----  Regarding: HOSPITAL FOLLOW UP APPOINTMENT WITH DR. LOPEZ  PLEASE CALL TO SCHEDULE 1 WEEK HOSPITAL FOLLOW UP APPOINTMENT

## 2021-04-13 NOTE — TELEPHONE ENCOUNTER
CALLED PATIENT TO SCHEDULE AN APT FOR HOSPITAL FU. UNABLE TO REACH PATIENT OR LEAVE A VM DUE TO NOT HAVING A VM SET UP.    THANKS,  TASHI

## 2021-04-13 NOTE — OUTREACH NOTE
Call Center TCM Note      Responses   [facility] patient discharged from?  [place]   Does the patient have one of the following disease processes/diagnoses(primary or secondary)?  Other   TCM attempt successful?  No   Unsuccessful attempts  Attempt 1 [no medical release, and no voicemail]          Noah Green RN    4/13/2021, 13:12 CDT

## 2021-04-13 NOTE — PAYOR COMM NOTE
"Caty Kennedy  [facility]  P: [phone]  F: [phone]    [facility]#[id number]    Yamileth Slater (61 y.o. 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                 Active Insurance as of 3/31/2021     Primary Coverage     Payor Plan Insurance Group Employer/Plan Group    [employer] MEDICARE REPLACEMENT HU[employer]NA MEDICARE REPLACEMENT [id number]     Payor Plan Address Payor Plan Phone Number Payor Plan Fax Number Effective Dates    [address] [address] [phone]  7/1/2020 - None Entered    [facility] [id number]       Subscriber Name Subscriber Birth Date Member ID       YAMILETH SLATER 1960 [id number]           Secondary Coverage     Payor Plan Insurance Group Employer/Plan Group    [place]      Payor Plan Address Payor Plan Phone Number Payor Plan Fax Number Effective Dates    [address] [phone]  6/28/2019 - None Entered    [facility] [id number]       Subscriber Name Subscriber Birth Date Member ID       YAMILETH SLATER 1960 [id number]                 Emergency Contacts      (Rel.) Home Phone Work Phone Mobile Phone "    Huy Slater (Spouse) [phone] -- [phone]    Yamileth Chavez (Daughter) [phone] -- [phone]    MiguelSuzanne (Daughter) [phone] -- [phone]            Discharge Summary    No notes of this type exist for this encounter.         Discharge Order (From admission, onward)     Start     Ordered    04/12/21 1638  Discharge patient  Once     Expected Discharge Date: 04/12/21    Discharge Disposition[place] [facility] [place]    Physician of Record for Attribution - Please select from Treatment Team: TASHI STERN [[id number]]    Review needed by CMO to determine Physician of Record: No       Question Answer Comment   Physician of Record for Attribution - Please select from Treatment Team TASHI STERN    Review needed by CMO to determine Physician of Record No        04/12/21 5514

## 2021-04-14 ENCOUNTER — TRANSITIONAL CARE MANAGEMENT TELEPHONE ENCOUNTER (OUTPATIENT)
Dept: CALL CENTER | Facility: HOSPITAL | Age: 61
End: 2021-04-14

## 2021-04-14 NOTE — DISCHARGE PLACEMENT REQUEST
"Please let me know if you can accept patient. She is aware that it may be next week before you can see her. Thank you    KENTRELL Cooley  [phone]        Yamileth Slater (61 y.o. Female)     Date of Birth Social Security Number Address Home Phone MRN    1960  [address]  [place] [address] [phone] [id number]    Jewish Marital Status          Religious        Admission Date Admission Type Admitting Provider Attending Provider Department, Room/Bed    3/31/21 Emergency Yadira Delarosa MD  [facility] [place], 333/1    Discharge Date Discharge Disposition Discharge Destination        [place]20[place] [facility] [place]              Attending Provider: (none)   Allergies: Adhesive Tape, Other    Isolation: None   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                 Active Insurance as of 3/31/2021     Primary Coverage     Payor Plan Insurance Group Employer/Plan Group    HUMANA MEDICARE REPLACEMENT HUMANA MEDICARE REPLACEMENT [id number]     Payor Plan Address Payor Plan Phone Number Payor Plan Fax Number Effective Dates    [address] [address] [phone]  7/1/2020 - None Entered    [facility] [id number]       Subscriber Name Subscriber Birth Date Member ID       YAMILETH SLATER 1960 [id number]           Secondary Coverage     Payor Plan Insurance Group Employer/Plan Group    [place]      Payor Plan Address Payor Plan Phone Number Payor Plan Fax Number Effective Dates    [address] [phone]  6/28/2019 - None Entered    [facility] [id number]       Subscriber Name Subscriber Birth Date Member ID       YAMILETH SLATER 1960 [id number]                 Emergency Contacts      (Rel.) Home " Phone Work Phone Mobile Phone    Huy Slater (Spouse) [phone] -- [phone]    Yamileth Chavez (Daughter) [phone] -- [phone]    Suzanne Rivera (Daughter) [phone] -- [phone]            Insurance Information                HUMANA MEDICARE REPLACEMENT/HUMANA MEDICARE REPLACEMENT Phone: [phone]    Subscriber: Yamileth Slater Subscriber#: [id number]    Group#: [id number] Precert#:         [place] Phone: [phone]    Subscriber: Yamileth Slater Subscriber#: [id number]    Group#:  Precert#:              Discharge Summary      Mone Us MD at 21 1825     Attestation signed by Pauline Martinez MD at 21 0822    I have seen the patient on the day of discharge.  I have reviewed the notes, assessments, and/or procedures performed by Dr. Us, I concur with her/his documentation and assessment and plan for Yamileth Slater.            This document has been electronically signed by Pauline Martinez MD on 2021 08:22 CDT                       DISCHARGE SUMMARY    PATIENT NAME: Yamileth Nga Slater       PHYSICIAN: Mone Us MD  : 1960  MRN: [id number]    ADMITTED: 3/31/2021     DISCHARGED: 21      ADMISSION DIAGNOSES:  Active Hospital Problems    Diagnosis  POA   • **Type 2 diabetes mellitus with hyperglycemia, with long-term current use of insulin (CMS/[facility]) [E11.65, Z79.4]  Not Applicable   • COVID-19 ruled out by laboratory testing [Z20.822]  Yes   • Esophageal dysphagia [R13.10]  Yes   • Monilial esophagitis (CMS/[facility]) [B37.81]  Unknown   • Alkaline phosphatase elevation [R74.8]  Yes   • MRSA infection [A49.02]  Yes   • Cutaneous candidiasis [B37.2]  Yes   • Community acquired pneumonia of right middle lobe of lung [J18.9]  Yes   • Hyponatremia [E87.1]  Yes   • Anemia [D64.9]  Yes   • Acute renal failure superimposed on chronic kidney disease (CMS/HCC) [N17.9, N18.9]  Yes   • Chronic midline low back pain  without sciatica [M54.5, G89.29]  Yes   • Chronic obstructive lung disease (CMS/HCC) [J44.9]  Yes   • GERD (gastroesophageal reflux disease) [K21.9]  Yes   • Coronary artery disease [I25.10]  Yes   • Current smoker [F17.200]  Yes      Resolved Hospital Problems    Diagnosis Date Resolved POA   • Metabolic acidosis [E87.2] 04/08/2021 Yes   • Hyperkalemia [E87.5] 04/08/2021 Yes   • Hypocalcemia [E83.51] 04/08/2021 Yes   • Acute cystitis [N30.00] 04/08/2021 Yes     DISCHARGE DIAGNOSES:   Active Hospital Problems    Diagnosis  POA   • **Type 2 diabetes mellitus with hyperglycemia, with long-term current use of insulin (CMS/[facility]) [E11.65, Z79.4]  Not Applicable   • COVID-19 ruled out by laboratory testing [Z20.822]  Yes   • Esophageal dysphagia [R13.10]  Yes   • Monilial esophagitis (CMS/[facility]) [B37.81]  Unknown   • Alkaline phosphatase elevation [R74.8]  Yes   • MRSA infection [A49.02]  Yes   • Cutaneous candidiasis [B37.2]  Yes   • Community acquired pneumonia of right middle lobe of lung [J18.9]  Yes   • Hyponatremia [E87.1]  Yes   • Anemia [D64.9]  Yes   • Acute renal failure superimposed on chronic kidney disease (CMS/HCC) [N17.9, N18.9]  Yes   • Chronic midline low back pain without sciatica [M54.5, G89.29]  Yes   • Chronic obstructive lung disease (CMS/HCC) [J44.9]  Yes   • GERD (gastroesophageal reflux disease) [K21.9]  Yes   • Coronary artery disease [I25.10]  Yes   • Current smoker [F17.200]  Yes      Resolved Hospital Problems    Diagnosis Date Resolved POA   • Metabolic acidosis [E87.2] 04/08/2021 Yes   • Hyperkalemia [E87.5] 04/08/2021 Yes   • Hypocalcemia [E83.51] 04/08/2021 Yes   • Acute cystitis [N30.00] 04/08/2021 Yes       SERVICE: Family Medicine Residency  Attending: Pauline Martinez MD  Resident: Mone Us MD    CONSULTS:   Consult Orders (all) (From admission, onward)     Start     Ordered    04/09/21 1217  Inpatient Gastroenterology Consult  Once     Specialty:  Gastroenterology  Provider:  Felicia  MD Mingo    04/09/21 1217    04/03/21 1343  Inpatient Nephrology Consult  Once     Specialty:  Nephrology  Provider:  Timothy Valle MD    04/03/21 1344    03/31/21 2016  Inpatient Nutrition Consult  Once,   Status:  Canceled     Provider:  (Not yet assigned)    03/31/21 2015 03/31/21 2016  Inpatient Diabetes Educator Consult  Once,   Status:  Canceled     Provider:  (Not yet assigned)    03/31/21 2015 03/31/21 1728  Inpatient Nutrition Consult  Once,   Status:  Canceled     Provider:  (Not yet assigned)    03/31/21 1728    03/31/21 1728  Inpatient Diabetes Educator Consult  Once,   Status:  Canceled     Provider:  (Not yet assigned)    03/31/21 1728                PROCEDURES:   EGD: 4/12/21    HISTORY OF PRESENT ILLNESS: Per Dr. Coffey's H&P on 3/31/21  Yamileth Slater is a 61 y.o. female with a CMH of type 2 diabetes mellitus, chronic kidney disease, anemia, COPD, tobacco use, GERD who presents to the ED with complaints of weakness and multiple falls.  The symptoms have been present for the past 2 weeks and has progressively worsened.  Patient states that she fell multiple times and thus developed gluteal and back pain.  Her  reports that she appears lethargic and often has moments where she is confused and speaks to people that are not there.  She is normally able to ambulate however has not had much success with standing given that her legs give out easily.    DIAGNOSTIC DATA:   Lab Results (last 24 hours)     Procedure Component Value Units Date/Time    POC Glucose Once [[id number]]  (Abnormal) Collected: 04/12/21 1649    Specimen: Blood Updated: 04/12/21 1703     Glucose 287 mg/dL      Comment: RN NotifiedOperator: [id number] JUAQUIN GONGORAFERMeter ID: [id number]       POC Glucose Once [[id number]]  (Abnormal) Collected: 04/12/21 1006    Specimen: Blood Updated: 04/12/21 1646     Glucose 172 mg/dL      Comment: RN NotifiedOperator: [id number] JUAQUIN CONTRERASNIFERMeter ID: [id number]        Comprehensive Metabolic Panel [[id number]]  (Abnormal) Collected: 04/12/21 0541    Specimen: Blood Updated: 04/12/21 0643     Glucose 137 mg/dL      BUN 32 mg/dL      Creatinine 1.96 mg/dL      Sodium 133 mmol/L      Potassium 4.4 mmol/L      Chloride 100 mmol/L      CO2 27.0 mmol/L      Calcium 9.0 mg/dL      Total Protein 7.3 g/dL      Albumin 2.80 g/dL      ALT (SGPT) 12 U/L      AST (SGOT) 18 U/L      Alkaline Phosphatase 190 U/L      Total Bilirubin 0.2 mg/dL      eGFR Non African Amer 26 mL/min/1.73      Globulin 4.5 gm/dL      A/G Ratio 0.6 g/dL      BUN/Creatinine Ratio 16.3     Anion Gap 6.0 mmol/L     Narrative:      GFR Normal >60  Chronic Kidney Disease <60  Kidney Failure <15      POC Glucose Once [[id number]]  (Abnormal) Collected: 04/12/21 0624    Specimen: Blood Updated: 04/12/21 0637     Glucose 147 mg/dL      Comment: RN NotifiedOperator: [id number] DARIEN Cruz ID: [id number]       CBC & Differential [[id number]]  (Abnormal) Collected: 04/12/21 0541    Specimen: Blood Updated: 04/12/21 0629    Narrative:      The following orders were created for panel order CBC & Differential.  Procedure                               Abnormality         Status                     ---------                               -----------         ------                     CBC Auto Differential[[id number]]        Abnormal            Final result                 Please view results for these tests on the individual orders.    CBC Auto Differential [[id number]]  (Abnormal) Collected: 04/12/21 0541    Specimen: Blood Updated: 04/12/21 0629     WBC 10.58 10*3/mm3      RBC 3.50 10*6/mm3      Hemoglobin 9.9 g/dL      Hematocrit 30.7 %      MCV 87.7 fL      MCH 28.3 pg      MCHC 32.2 g/dL      RDW 15.5 %      RDW-SD 48.9 fl      MPV 9.0 fL      Platelets 374 10*3/mm3      Neutrophil % 58.7 %      Lymphocyte % 29.6 %      Monocyte % 6.2 %      Eosinophil % 3.4 %      Basophil % 0.9 %      Immature Grans % 1.2 %       Neutrophils, Absolute 6.20 10*3/mm3      Lymphocytes, Absolute 3.13 10*3/mm3      Monocytes, Absolute 0.66 10*3/mm3      Eosinophils, Absolute 0.36 10*3/mm3      Basophils, Absolute 0.10 10*3/mm3      Immature Grans, Absolute 0.13 10*3/mm3      nRBC 0.0 /100 WBC     COVID-19, [facility] IN-HOUSE, NP SWAB IN TRANSPORT MEDIA 8-10 HR TAT - Swab, Nasopharynx [[id number]]  (Normal) Collected: 04/11/21 1800    Specimen: Swab from Nasopharynx Updated: 04/12/21 0131     COVID19 Not Detected    Narrative:      Testing performed by Real Time RT-PCR  This test has not been approved by the [employer] but is authorized under the Emergency Use Act (EUA)    Fact sheet for providers: https://www.fda.gov/media/124783/download    Fact sheet for patients: https://www.fda.gov/media/674141/download        POC Glucose Once [[id number]]  (Abnormal) Collected: 04/11/21 2039    Specimen: Blood Updated: 04/12/21 0038     Glucose 284 mg/dL      Comment: RN NotifiedOperator: [id number] DARIEN Cruz ID: [id number]              HOSPITAL COURSE:  1. Patient developed latisha in the setting of her dka. Nephrology was consulted. Patient had a renal ultrasound that was negative for hydronephrosis. She was put on iv fluids, po biarb. nsaids and iv contrast were avoided. Patient returned back to her baseline creatinine of 1.8-2.0. Will continue with sodium bicarb outpatient and to follow up with nephrology within two weeks.     2. Admitted for DKA most likely secondary to the inciting event of pneumonia. Was initiated on the DKA protocol, resolved with iv fluids and insulin. Free style payam was placed. Insulin requirements were adjusted during admission. Patient will follow up with pcp for further management.     3. CAP of the right middle lobe per cxr on 4/5. Patient was put on clindamycin and completed a regimen of eight days. Patient is at her baseline respiratory status. Will follow up with pcp outpatient within one week.     4. Patient developed  dysphagia and chest pain  During admission. GI was consulted. Patient was continued on her home dose of ppi. She had ane egd done on 4/12 which showed moderate to severe monilial eosphagitis. Patient will be sent home on protonix, carafate, and to follow up with gi within one week.      5. Patient came in with a slightly displaced wrist fracture. She will follow up with orthopedic outpatient for management of this.     6. She was continued on her home medications and tolerated them well. She worked with PT/OT during her admission; whom recommended home health.     DISCHARGE CONDITION:   Stable     [place] [facility] [place]CHARGE MEDICATIONS     Discharge Medications      New Medications      Instructions Start Date   FreeStyle Jade 2 Colman device  Notes to patient: Use as directed   1 each, Does not apply, Continuous      sucralfate 1 g tablet  Commonly known as: CARAFATE   1 g, Oral, 4 Times Daily         Changes to Medications      Instructions Start Date   Lantus SoloStar 100 UNIT/ML injection pen  Generic drug: Insulin Glargine  What changed: how much to take  Notes to patient: Use as directed   Inject 30 Units under the skin into the appropriate area as directed Every 12 (Twelve) Hours **100 day supply**      metoprolol tartrate 100 MG tablet  Commonly known as: LOPRESSOR  What changed: when to take this   100 mg, Oral, Every 12 Hours Scheduled      NovoLOG FlexPen 100 UNIT/ML solution pen-injector sc pen  Generic drug: insulin aspart  What changed: how much to take  Notes to patient: Use as directed   20 Units, Subcutaneous, 3 Times Daily With Meals      sodium bicarbonate 650 MG tablet  What changed:   · medication strength  · how much to take   650 mg, Oral, 2 Times Daily         Continue These Medications      Instructions Start Date   albuterol sulfate  (90 Base) MCG/ACT inhaler  Commonly known as: PROVENTIL HFA;VENTOLIN HFA;PROAIR HFA   2 puffs, Inhalation, Every 4 Hours PRN       amoxicillin-clavulanate 875-125 MG per tablet  Commonly known as: Augmentin   1 tablet, Oral, Daily      atorvastatin 40 MG tablet  Commonly known as: LIPITOR   40 mg, Oral, Daily      azithromycin 250 MG tablet  Commonly known as: ZITHROMAX  Notes to patient: Take as directed   No dose, route, or frequency recorded.      cetirizine 10 MG tablet  Commonly known as: zyrTEC   10 mg, Oral, Daily      clopidogrel 75 MG tablet  Commonly known as: PLAVIX   75 mg, Oral, Daily      [employer] Blood Glucose Meter w/Device kit  Notes to patient: Use as directed   1 each, Does not apply, 3 Times Daily      cyclobenzaprine 10 MG tablet  Commonly known as: FLEXERIL   10 mg, Oral, 2 Times Daily PRN      DULoxetine 20 MG capsule  Commonly known as: Cymbalta   20 mg, Oral, Daily      Easy Touch Pen Needles 31G X 8 MM misc  Generic drug: Insulin Pen Needle  Notes to patient: Use as directed   No dose, route, or frequency recorded.      NovoFine 30G X 8 MM misc  Generic drug: Insulin Pen Needle  Notes to patient: Use as directed   As directed 4 times daily      ferrous sulfate 325 (65 FE) MG tablet  Commonly known as: FeroSul   325 mg, Oral, Daily With Breakfast      FreeStyle Jade 2 Sensor misc  Notes to patient: Use as directed   1 each, Does not apply, Every 14 Days      gabapentin 800 MG tablet  Commonly known as: NEURONTIN   800 mg, Oral, 3 Times Daily      glucose blood test strip  Notes to patient: Use as directed   Use as instructed      isosorbide mononitrate 30 MG 24 hr tablet  Commonly known as: IMDUR   30 mg, Oral, Daily      Jardiance 25 MG tablet  Generic drug: Empagliflozin   25 mg, Oral, Daily      lidocaine 5 %  Commonly known as: LIDODERM  Notes to patient: Apply as directed   PLACE 1 PATCH ON THE SKIN AS DIRECTED BY PROVIDER DAILY. REMOVE & DISCARD PATCH WITHIN 12 HOURS OR AS DIRECTED BY MD      lisinopril 10 MG tablet  Commonly known as: PRINIVIL,ZESTRIL   10 mg, Oral, Daily      montelukast 10 MG tablet  Commonly  known as: SINGULAIR   10 mg, Oral, Nightly      nitroglycerin 0.4 MG SL tablet  Commonly known as: NITROSTAT   0.4 mg, Sublingual, As Needed, Take no more than 3 doses in 15 minutes.       nystatin 225757 UNIT/GM powder  Commonly known as: MYCOSTATIN   Topical, 3 Times Daily      ondansetron ODT 4 MG disintegrating tablet  Commonly known as: Zofran ODT   4 mg, Translingual, Every 8 Hours PRN      oxybutynin XL 5 MG 24 hr tablet  Commonly known as: DITROPAN-XL   5 mg, Oral, Daily      pantoprazole 40 MG EC tablet  Commonly known as: PROTONIX   40 mg, Oral, Nightly      potassium chloride 10 MEQ CR capsule  Commonly known as: MICRO-K   10 mEq, Oral, Daily      promethazine 25 MG tablet  Commonly known as: PHENERGAN   25 mg, Oral, Every 6 Hours PRN         Stop These Medications    methylPREDNISolone 4 MG dose pack  Commonly known as: MEDROL            INSTRUCTIONS:  Activity:   Activity Instructions     Activity as Tolerated          Diet:   Diet Instructions     Diet: Consistent Carbohydrate, Cardiac, Renal      Discharge Diet:  Consistent Carbohydrate  Cardiac  Renal             FOLLOW UP:   Additional Instructions for the Follow-ups that You Need to Schedule     Ambulatory Referral to Home Health   As directed      Face to Face Visit Date: 4/12/2021    Follow-up provider for Plan of Care?: I treated the patient in an acute care facility and will not continue treatment after discharge.    Follow-up provider: ELIZABET LOPEZ [[id number]]    Reason/Clinical Findings: physcial deconditioing    Describe mobility limitations that make leaving home difficult: Physical deocnditioining wrist fracture    Nursing/Therapeutic Services Requested: Skilled Nursing Physical Therapy Occupational Therapy    Skilled nursing orders: Medication education    PT orders: Therapeutic exercise (WBAT Wrist fracure ok'ed by Román) Gait Training Transfer training    Weight Bearing Status: As Tolerated    Occupational orders: Activities of  daily living Energy conservation Strengthening Fine motor Home safety assessment    Frequency: 1 Week 1         Call MD With Problems / Concerns   As directed      Instructions: Trouble swallowing elevated sugar    Order Comments: Instructions: Trouble swallowing elevated sugar          Discharge Follow-up with PCP   As directed       Currently Documented PCP:    Nirmal Calvillo MD    PCP Phone Number:    [phone]     Follow Up Details: 1 week         Discharge Follow-up with Specified Provider: Dr. [facility] [place] [facility]   As directed      To: Dr. [facility]    Follow Up Details: Wrist fracture healing with physical therapy at home         Discharge Follow-up with Specified Provider: Dr. Duarte; 1 Week   As directed      To: Dr. Duarte    Follow Up: 1 Week    Follow Up Details: Monilial Esophagitis           Follow-up Information     Ace Lauren MD Follow up in 3 week(s).    Specialty: Nephrology  Why: OFFICE WILL CALL TO SCHEDULE HOSPITAL FOLLOW UP APPOINTMENT  Contact information:  [address] [address]  [phone]             Nirmal Calvillo MD Follow up.    Specialties: Family Medicine, Emergency Medicine  Why: OFFICE WILL CALL TO SCHEDULE HOSPITAL FOLLOW UP APPOINMENT  Contact information:  [address] [address]  [phone]             Mingo Duarte MD Follow up.    Specialty: Gastroenterology  Why: OFFICE WILL CALL TO SCHEDULE HOSPITAL FOLLOW UP APPOINTMENT  Contact information:  [address] [place] [address] [address] [id number] [phone]             Ari Nava MD PhD Follow up.    Specialty: Orthopedic Surgery  Why: OFFICE WILL CALL TO SCHEDULE HOSPITAL FOLLOW UP APPOINTMENT  Contact information:  [id number] [place], [place] [address] [phone]             Nirmal Calvillo MD .    Specialties: Family Medicine, Emergency Medicine  Contact information:  [address] [address]  [phone]                   PENDING TEST RESULTS AT DISCHARGE  Pending Labs     Order Current Status    Tissue Pathology Exam In process          Time: >30 minutes were spent in discharge planning, medication reconciliation and coordination of care for this patient.    Dr. Martinez is the attending at time of discharge, She is aware of the patient's status and agrees with the above discharge summary.         Mone Us M.D. PGY3  [facility]  [address] [place] [address]  Office: [phone]    This document has been electronically signed by Mone Us MD on April 13, 2021 17:42 CDT              Electronically signed by Pauline Martinez MD at 04/14/21 0822     Ambulatory Referral to Home Health [[id number]] (Order [id number])  Order  Date: 4/12/2021 Department: [address] Ordering/Authorizing: Umesh Giraldo III, MD   Order History  Outpatient  Date/Time Action Taken User Additional Information   04/12/21 1658 Sign Umesh Giraldo III, MD    Order Details    Frequency Duration Priority Order Class   None None Routine Internal Referral   Start Date/Time    Start Date   04/12/21   Order Information    Order Date Service Start Date Start Time   04/12/21 Emergency Medicine 04/12/21    Reference Links    Associated Reports External References   View Encounter Current Health Referral Information   Order Questions    Question Answer Comment   Face to Face Visit Date: 4/12/2021    Follow-up provider for Plan of Care? I treated the patient in an acute care facility and will not continue treatment after discharge.    Follow-up provider: ELIZABET LOPEZ    Reason/Clinical Findings physcial deconditioing    Describe mobility limitations that make leaving home difficult: Physical deocnditioining wrist fracture    Nursing/Therapeutic Services Requested Skilled Nursing     Physical Therapy      Occupational Therapy    Skilled nursing orders: Medication education    PT orders: Therapeutic exercise WBAT Wrist fracure ok'ed by Román    Gait Training     Transfer training    Weight Bearing Status As Tolerated    Occupational orders: Activities of daily living     Energy conservation     Strengthening     Fine motor     Home safety assessment    Frequency: 1 Week 1           Source Order Set / Preference List    Order Set    IP GEN EXPRESS DISCHARGE [[id number]]   Clinical Indications     ICD-10-CM ICD-9-CM   Impaired mobility and ADLs     Z74.09  Z78.9 V49.89   Impaired functional mobility, balance, gait, and endurance     Z74.09 V49.89   Reprint Order Requisition    Ambulatory Referral to Home Health (Order #[id number]) on 4/12/21   Encounter    View Encounter          Order Provider Info        Office phone Pager E-mail   Ordering User Umesh Giraldo III, [phone] -- --   Authorizing Provider Umesh Giraldo III, [phone] -- --   Attending Provider When Ordered Mone Us [phone] -- --   Linked Charges    No charges linked to this procedure   Tracking Reports  Cosign Tracking Order Transmittal Tracking   Authorized by:  Umesh Giraldo III, MD  (NPI: [id number])       Lab Component SmartPhrase Guide    Ambulatory Referral to Home Health (Order #[id number]) on 4/12/21

## 2021-04-14 NOTE — OUTREACH NOTE
Call Center TCM Note      Responses   [facility] patient discharged from?  [place]   Does the patient have one of the following disease processes/diagnoses(primary or secondary)?  Other   TCM attempt successful?  No   Unsuccessful attempts  Attempt 3          Dayna Schuster RN    4/14/2021, 12:49 CDT

## 2021-04-15 ENCOUNTER — TELEPHONE (OUTPATIENT)
Dept: FAMILY MEDICINE CLINIC | Facility: CLINIC | Age: 61
End: 2021-04-15

## 2021-04-15 NOTE — TELEPHONE ENCOUNTER
SPOKE TO PATIENT ON 04/14/2021 TO SCHEDULE HOSPITAL F/U; PATIENT DOESN'T HAVE RIDE AT THIS TIME; WILL CALL BACK WHEN SHE GETS A RIDE.           THANK YOU,        RAINER

## 2021-04-15 NOTE — TELEPHONE ENCOUNTER
CALLED PATIENT X2 TO SCHEDULE 1 WEEK HOSPITAL FOLLOW UP APPOINTMENT. UNABLE TO REACH OR LEAVE A VOICEMAIL.    THANKS,  TASHI

## 2021-04-16 LAB
LAB AP CASE REPORT: NORMAL
PATH REPORT.FINAL DX SPEC: NORMAL

## 2021-04-19 ENCOUNTER — TELEPHONE (OUTPATIENT)
Dept: FAMILY MEDICINE CLINIC | Facility: CLINIC | Age: 61
End: 2021-04-19

## 2021-04-19 NOTE — TELEPHONE ENCOUNTER
PT LEFT VOICEMAIL REQUESTING APT WITH DR. LOPEZ FOR HOSPITAL FOLLOW UP. UNABLE TO REACH PT. NO VOICEMAIL SET UP.

## 2021-04-21 ENCOUNTER — READMISSION MANAGEMENT (OUTPATIENT)
Dept: CALL CENTER | Facility: HOSPITAL | Age: 61
End: 2021-04-21

## 2021-04-21 NOTE — OUTREACH NOTE
Medical Week 2 Survey      Responses   [facility] patient discharged from?  [place]   Does the patient have one of the following disease processes/diagnoses(primary or secondary)?  Other   Week 2 attempt successful?  No   Unsuccessful attempts  Attempt 1          Christen Lara RN

## 2021-04-23 ENCOUNTER — APPOINTMENT (OUTPATIENT)
Dept: CARDIOLOGY | Facility: HOSPITAL | Age: 61
End: 2021-04-23

## 2021-04-23 ENCOUNTER — READMISSION MANAGEMENT (OUTPATIENT)
Dept: CALL CENTER | Facility: HOSPITAL | Age: 61
End: 2021-04-23

## 2021-04-23 ENCOUNTER — HOSPITAL ENCOUNTER (INPATIENT)
Facility: HOSPITAL | Age: 61
LOS: 1 days | Discharge: ANOTHER HOSPITAL | End: 2021-04-23
Attending: EMERGENCY MEDICINE | Admitting: FAMILY MEDICINE

## 2021-04-23 ENCOUNTER — APPOINTMENT (OUTPATIENT)
Dept: GENERAL RADIOLOGY | Facility: HOSPITAL | Age: 61
End: 2021-04-23

## 2021-04-23 VITALS
RESPIRATION RATE: 22 BRPM | DIASTOLIC BLOOD PRESSURE: 74 MMHG | SYSTOLIC BLOOD PRESSURE: 161 MMHG | TEMPERATURE: 97.7 F | WEIGHT: 273.37 LBS | HEIGHT: 62 IN | HEART RATE: 98 BPM | OXYGEN SATURATION: 99 % | BODY MASS INDEX: 50.31 KG/M2

## 2021-04-23 DIAGNOSIS — I24.9 ACUTE CORONARY SYNDROME (HCC): ICD-10-CM

## 2021-04-23 DIAGNOSIS — I21.4 NSTEMI, INITIAL EPISODE OF CARE (HCC): Primary | ICD-10-CM

## 2021-04-23 LAB
ALBUMIN SERPL-MCNC: 3.1 G/DL (ref 3.5–5.2)
ALBUMIN/GLOB SERPL: 0.6 G/DL
ALP SERPL-CCNC: 194 U/L (ref 39–117)
ALT SERPL W P-5'-P-CCNC: 14 U/L (ref 1–33)
ANION GAP SERPL CALCULATED.3IONS-SCNC: 10 MMOL/L (ref 5–15)
APTT PPP: 32.5 SECONDS (ref 20–40.3)
AST SERPL-CCNC: 21 U/L (ref 1–32)
BASOPHILS # BLD AUTO: 0.07 10*3/MM3 (ref 0–0.2)
BASOPHILS # BLD AUTO: 0.1 10*3/MM3 (ref 0–0.2)
BASOPHILS NFR BLD AUTO: 0.9 % (ref 0–1.5)
BASOPHILS NFR BLD AUTO: 1.1 % (ref 0–1.5)
BILIRUB SERPL-MCNC: 0.2 MG/DL (ref 0–1.2)
BUN SERPL-MCNC: 28 MG/DL (ref 8–23)
BUN/CREAT SERPL: 12.6 (ref 7–25)
CALCIUM SPEC-SCNC: 9.5 MG/DL (ref 8.6–10.5)
CHLORIDE SERPL-SCNC: 105 MMOL/L (ref 98–107)
CO2 SERPL-SCNC: 22 MMOL/L (ref 22–29)
CREAT SERPL-MCNC: 2.22 MG/DL (ref 0.57–1)
DEPRECATED RDW RBC AUTO: 49 FL (ref 37–54)
DEPRECATED RDW RBC AUTO: 49.6 FL (ref 37–54)
EOSINOPHIL # BLD AUTO: 0.19 10*3/MM3 (ref 0–0.4)
EOSINOPHIL # BLD AUTO: 0.24 10*3/MM3 (ref 0–0.4)
EOSINOPHIL NFR BLD AUTO: 2.2 % (ref 0.3–6.2)
EOSINOPHIL NFR BLD AUTO: 3.1 % (ref 0.3–6.2)
ERYTHROCYTE [DISTWIDTH] IN BLOOD BY AUTOMATED COUNT: 15.3 % (ref 12.3–15.4)
ERYTHROCYTE [DISTWIDTH] IN BLOOD BY AUTOMATED COUNT: 15.4 % (ref 12.3–15.4)
FLUAV RNA RESP QL NAA+PROBE: NOT DETECTED
FLUBV RNA RESP QL NAA+PROBE: NOT DETECTED
GFR SERPL CREATININE-BSD FRML MDRD: 22 ML/MIN/1.73
GLOBULIN UR ELPH-MCNC: 4.9 GM/DL
GLUCOSE BLDC GLUCOMTR-MCNC: 107 MG/DL (ref 70–130)
GLUCOSE BLDC GLUCOMTR-MCNC: 64 MG/DL (ref 70–130)
GLUCOSE SERPL-MCNC: 164 MG/DL (ref 65–99)
HCT VFR BLD AUTO: 30.8 % (ref 34–46.6)
HCT VFR BLD AUTO: 34.8 % (ref 34–46.6)
HGB BLD-MCNC: 10.8 G/DL (ref 12–15.9)
HGB BLD-MCNC: 9.6 G/DL (ref 12–15.9)
HOLD SPECIMEN: NORMAL
HOLD SPECIMEN: NORMAL
IMM GRANULOCYTES # BLD AUTO: 0.05 10*3/MM3 (ref 0–0.05)
IMM GRANULOCYTES # BLD AUTO: 0.09 10*3/MM3 (ref 0–0.05)
IMM GRANULOCYTES NFR BLD AUTO: 0.6 % (ref 0–0.5)
IMM GRANULOCYTES NFR BLD AUTO: 1 % (ref 0–0.5)
INR PPP: 1.12 (ref 0.8–1.2)
LYMPHOCYTES # BLD AUTO: 1.95 10*3/MM3 (ref 0.7–3.1)
LYMPHOCYTES # BLD AUTO: 2.57 10*3/MM3 (ref 0.7–3.1)
LYMPHOCYTES NFR BLD AUTO: 22.2 % (ref 19.6–45.3)
LYMPHOCYTES NFR BLD AUTO: 33.1 % (ref 19.6–45.3)
MCH RBC QN AUTO: 27.6 PG (ref 26.6–33)
MCH RBC QN AUTO: 27.8 PG (ref 26.6–33)
MCHC RBC AUTO-ENTMCNC: 31 G/DL (ref 31.5–35.7)
MCHC RBC AUTO-ENTMCNC: 31.2 G/DL (ref 31.5–35.7)
MCV RBC AUTO: 89 FL (ref 79–97)
MCV RBC AUTO: 89.3 FL (ref 79–97)
MONOCYTES # BLD AUTO: 0.47 10*3/MM3 (ref 0.1–0.9)
MONOCYTES # BLD AUTO: 0.59 10*3/MM3 (ref 0.1–0.9)
MONOCYTES NFR BLD AUTO: 5.4 % (ref 5–12)
MONOCYTES NFR BLD AUTO: 7.6 % (ref 5–12)
NEUTROPHILS NFR BLD AUTO: 4.24 10*3/MM3 (ref 1.7–7)
NEUTROPHILS NFR BLD AUTO: 5.98 10*3/MM3 (ref 1.7–7)
NEUTROPHILS NFR BLD AUTO: 54.7 % (ref 42.7–76)
NEUTROPHILS NFR BLD AUTO: 68.1 % (ref 42.7–76)
NRBC BLD AUTO-RTO: 0 /100 WBC (ref 0–0.2)
NRBC BLD AUTO-RTO: 0 /100 WBC (ref 0–0.2)
NT-PROBNP SERPL-MCNC: 1441 PG/ML (ref 0–900)
PLATELET # BLD AUTO: 361 10*3/MM3 (ref 140–450)
PLATELET # BLD AUTO: 394 10*3/MM3 (ref 140–450)
PMV BLD AUTO: 8.7 FL (ref 6–12)
PMV BLD AUTO: 8.8 FL (ref 6–12)
POTASSIUM SERPL-SCNC: 4.8 MMOL/L (ref 3.5–5.2)
PROT SERPL-MCNC: 8 G/DL (ref 6–8.5)
PROTHROMBIN TIME: 14.9 SECONDS (ref 11.1–15.3)
QT INTERVAL: 368 MS
QT INTERVAL: 400 MS
QTC INTERVAL: 482 MS
QTC INTERVAL: 489 MS
RBC # BLD AUTO: 3.45 10*6/MM3 (ref 3.77–5.28)
RBC # BLD AUTO: 3.91 10*6/MM3 (ref 3.77–5.28)
SARS-COV-2 RNA RESP QL NAA+PROBE: NOT DETECTED
SODIUM SERPL-SCNC: 137 MMOL/L (ref 136–145)
TROPONIN T SERPL-MCNC: 0.09 NG/ML (ref 0–0.03)
TROPONIN T SERPL-MCNC: 0.14 NG/ML (ref 0–0.03)
WBC # BLD AUTO: 7.76 10*3/MM3 (ref 3.4–10.8)
WBC # BLD AUTO: 8.78 10*3/MM3 (ref 3.4–10.8)
WHOLE BLOOD HOLD SPECIMEN: NORMAL

## 2021-04-23 PROCEDURE — 99223 1ST HOSP IP/OBS HIGH 75: CPT | Performed by: INTERNAL MEDICINE

## 2021-04-23 PROCEDURE — 82962 GLUCOSE BLOOD TEST: CPT

## 2021-04-23 PROCEDURE — 0 IOPAMIDOL PER 1 ML: Performed by: INTERNAL MEDICINE

## 2021-04-23 PROCEDURE — 85610 PROTHROMBIN TIME: CPT | Performed by: STUDENT IN AN ORGANIZED HEALTH CARE EDUCATION/TRAINING PROGRAM

## 2021-04-23 PROCEDURE — 85730 THROMBOPLASTIN TIME PARTIAL: CPT | Performed by: STUDENT IN AN ORGANIZED HEALTH CARE EDUCATION/TRAINING PROGRAM

## 2021-04-23 PROCEDURE — C1894 INTRO/SHEATH, NON-LASER: HCPCS | Performed by: INTERNAL MEDICINE

## 2021-04-23 PROCEDURE — 25010000002 HEPARIN (PORCINE) PER 1000 UNITS: Performed by: STUDENT IN AN ORGANIZED HEALTH CARE EDUCATION/TRAINING PROGRAM

## 2021-04-23 PROCEDURE — 85025 COMPLETE CBC W/AUTO DIFF WBC: CPT | Performed by: INTERNAL MEDICINE

## 2021-04-23 PROCEDURE — 93010 ELECTROCARDIOGRAM REPORT: CPT | Performed by: INTERNAL MEDICINE

## 2021-04-23 PROCEDURE — 25010000002 MIDAZOLAM PER 1 MG: Performed by: INTERNAL MEDICINE

## 2021-04-23 PROCEDURE — 25010000002 HEPARIN (PORCINE) PER 1000 UNITS: Performed by: INTERNAL MEDICINE

## 2021-04-23 PROCEDURE — C1769 GUIDE WIRE: HCPCS | Performed by: INTERNAL MEDICINE

## 2021-04-23 PROCEDURE — 93005 ELECTROCARDIOGRAM TRACING: CPT | Performed by: EMERGENCY MEDICINE

## 2021-04-23 PROCEDURE — 99284 EMERGENCY DEPT VISIT MOD MDM: CPT

## 2021-04-23 PROCEDURE — 85025 COMPLETE CBC W/AUTO DIFF WBC: CPT | Performed by: EMERGENCY MEDICINE

## 2021-04-23 PROCEDURE — 93005 ELECTROCARDIOGRAM TRACING: CPT

## 2021-04-23 PROCEDURE — 99236 HOSP IP/OBS SAME DATE HI 85: CPT | Performed by: STUDENT IN AN ORGANIZED HEALTH CARE EDUCATION/TRAINING PROGRAM

## 2021-04-23 PROCEDURE — 93459 L HRT ART/GRFT ANGIO: CPT | Performed by: INTERNAL MEDICINE

## 2021-04-23 PROCEDURE — 80053 COMPREHEN METABOLIC PANEL: CPT | Performed by: EMERGENCY MEDICINE

## 2021-04-23 PROCEDURE — 93005 ELECTROCARDIOGRAM TRACING: CPT | Performed by: PHYSICIAN ASSISTANT

## 2021-04-23 PROCEDURE — 83880 ASSAY OF NATRIURETIC PEPTIDE: CPT | Performed by: EMERGENCY MEDICINE

## 2021-04-23 PROCEDURE — 87636 SARSCOV2 & INF A&B AMP PRB: CPT | Performed by: PHYSICIAN ASSISTANT

## 2021-04-23 PROCEDURE — 71045 X-RAY EXAM CHEST 1 VIEW: CPT

## 2021-04-23 PROCEDURE — 25010000002 FENTANYL CITRATE (PF) 100 MCG/2ML SOLUTION: Performed by: INTERNAL MEDICINE

## 2021-04-23 PROCEDURE — 25010000002 MORPHINE PER 10 MG: Performed by: STUDENT IN AN ORGANIZED HEALTH CARE EDUCATION/TRAINING PROGRAM

## 2021-04-23 PROCEDURE — C1760 CLOSURE DEV, VASC: HCPCS | Performed by: INTERNAL MEDICINE

## 2021-04-23 PROCEDURE — 4A023N7 MEASUREMENT OF CARDIAC SAMPLING AND PRESSURE, LEFT HEART, PERCUTANEOUS APPROACH: ICD-10-PCS | Performed by: INTERNAL MEDICINE

## 2021-04-23 PROCEDURE — 84484 ASSAY OF TROPONIN QUANT: CPT | Performed by: EMERGENCY MEDICINE

## 2021-04-23 PROCEDURE — B2111ZZ FLUOROSCOPY OF MULTIPLE CORONARY ARTERIES USING LOW OSMOLAR CONTRAST: ICD-10-PCS | Performed by: INTERNAL MEDICINE

## 2021-04-23 RX ORDER — METOPROLOL TARTRATE 100 MG/1
100 TABLET ORAL EVERY 12 HOURS SCHEDULED
Status: DISCONTINUED | OUTPATIENT
Start: 2021-04-23 | End: 2021-04-24 | Stop reason: HOSPADM

## 2021-04-23 RX ORDER — ISOSORBIDE MONONITRATE 30 MG/1
30 TABLET, EXTENDED RELEASE ORAL DAILY
Status: DISCONTINUED | OUTPATIENT
Start: 2021-04-23 | End: 2021-04-24 | Stop reason: HOSPADM

## 2021-04-23 RX ORDER — SUCRALFATE 1 G/1
1 TABLET ORAL 4 TIMES DAILY
Status: DISCONTINUED | OUTPATIENT
Start: 2021-04-23 | End: 2021-04-24 | Stop reason: HOSPADM

## 2021-04-23 RX ORDER — NITROGLYCERIN 0.4 MG/1
0.4 TABLET SUBLINGUAL AS NEEDED
Status: DISCONTINUED | OUTPATIENT
Start: 2021-04-23 | End: 2021-04-23 | Stop reason: SDUPTHER

## 2021-04-23 RX ORDER — SODIUM CHLORIDE 0.9 % (FLUSH) 0.9 %
10 SYRINGE (ML) INJECTION EVERY 12 HOURS SCHEDULED
Status: DISCONTINUED | OUTPATIENT
Start: 2021-04-23 | End: 2021-04-24 | Stop reason: HOSPADM

## 2021-04-23 RX ORDER — LISINOPRIL 10 MG/1
10 TABLET ORAL DAILY
Status: DISCONTINUED | OUTPATIENT
Start: 2021-04-23 | End: 2021-04-24 | Stop reason: HOSPADM

## 2021-04-23 RX ORDER — LIDOCAINE HYDROCHLORIDE 20 MG/ML
INJECTION, SOLUTION INFILTRATION; PERINEURAL AS NEEDED
Status: DISCONTINUED | OUTPATIENT
Start: 2021-04-23 | End: 2021-04-23 | Stop reason: HOSPADM

## 2021-04-23 RX ORDER — FENTANYL CITRATE 50 UG/ML
25 INJECTION, SOLUTION INTRAMUSCULAR; INTRAVENOUS
Status: DISCONTINUED | OUTPATIENT
Start: 2021-04-23 | End: 2021-04-24 | Stop reason: HOSPADM

## 2021-04-23 RX ORDER — DEXTROSE MONOHYDRATE 25 G/50ML
25 INJECTION, SOLUTION INTRAVENOUS
Status: DISCONTINUED | OUTPATIENT
Start: 2021-04-23 | End: 2021-04-24 | Stop reason: HOSPADM

## 2021-04-23 RX ORDER — ATORVASTATIN CALCIUM 40 MG/1
40 TABLET, FILM COATED ORAL DAILY
Status: DISCONTINUED | OUTPATIENT
Start: 2021-04-23 | End: 2021-04-24 | Stop reason: HOSPADM

## 2021-04-23 RX ORDER — HEPARIN SODIUM 10000 [USP'U]/100ML
10 INJECTION, SOLUTION INTRAVENOUS
Status: DISCONTINUED | OUTPATIENT
Start: 2021-04-23 | End: 2021-04-24 | Stop reason: HOSPADM

## 2021-04-23 RX ORDER — HEPARIN SODIUM 5000 [USP'U]/ML
4000 INJECTION, SOLUTION INTRAVENOUS; SUBCUTANEOUS ONCE
Status: COMPLETED | OUTPATIENT
Start: 2021-04-23 | End: 2021-04-23

## 2021-04-23 RX ORDER — GABAPENTIN 400 MG/1
400 CAPSULE ORAL EVERY 8 HOURS SCHEDULED
Status: DISCONTINUED | OUTPATIENT
Start: 2021-04-23 | End: 2021-04-24 | Stop reason: HOSPADM

## 2021-04-23 RX ORDER — NITROGLYCERIN 20 MG/100ML
5-200 INJECTION INTRAVENOUS
Status: DISCONTINUED | OUTPATIENT
Start: 2021-04-23 | End: 2021-04-24 | Stop reason: HOSPADM

## 2021-04-23 RX ORDER — ONDANSETRON 2 MG/ML
4 INJECTION INTRAMUSCULAR; INTRAVENOUS EVERY 6 HOURS PRN
Status: DISCONTINUED | OUTPATIENT
Start: 2021-04-23 | End: 2021-04-24 | Stop reason: HOSPADM

## 2021-04-23 RX ORDER — NITROGLYCERIN 0.4 MG/1
0.4 TABLET SUBLINGUAL
Status: DISCONTINUED | OUTPATIENT
Start: 2021-04-23 | End: 2021-04-24 | Stop reason: HOSPADM

## 2021-04-23 RX ORDER — ASPIRIN 81 MG/1
81 TABLET ORAL DAILY
Status: DISCONTINUED | OUTPATIENT
Start: 2021-04-24 | End: 2021-04-24 | Stop reason: HOSPADM

## 2021-04-23 RX ORDER — POTASSIUM CHLORIDE 750 MG/1
10 CAPSULE, EXTENDED RELEASE ORAL DAILY
Status: DISCONTINUED | OUTPATIENT
Start: 2021-04-23 | End: 2021-04-24 | Stop reason: HOSPADM

## 2021-04-23 RX ORDER — SODIUM BICARBONATE 650 MG/1
650 TABLET ORAL 2 TIMES DAILY
Status: DISCONTINUED | OUTPATIENT
Start: 2021-04-23 | End: 2021-04-24 | Stop reason: HOSPADM

## 2021-04-23 RX ORDER — NALOXONE HCL 0.4 MG/ML
0.4 VIAL (ML) INJECTION
Status: DISCONTINUED | OUTPATIENT
Start: 2021-04-23 | End: 2021-04-24 | Stop reason: HOSPADM

## 2021-04-23 RX ORDER — CETIRIZINE HYDROCHLORIDE 10 MG/1
10 TABLET ORAL DAILY
Status: DISCONTINUED | OUTPATIENT
Start: 2021-04-23 | End: 2021-04-24 | Stop reason: HOSPADM

## 2021-04-23 RX ORDER — NYSTATIN 100000 [USP'U]/G
POWDER TOPICAL 3 TIMES DAILY
Status: DISCONTINUED | OUTPATIENT
Start: 2021-04-23 | End: 2021-04-24 | Stop reason: HOSPADM

## 2021-04-23 RX ORDER — ACETAMINOPHEN 325 MG/1
650 TABLET ORAL EVERY 4 HOURS PRN
Status: DISCONTINUED | OUTPATIENT
Start: 2021-04-23 | End: 2021-04-24 | Stop reason: HOSPADM

## 2021-04-23 RX ORDER — SODIUM CHLORIDE 0.9 % (FLUSH) 0.9 %
10 SYRINGE (ML) INJECTION AS NEEDED
Status: DISCONTINUED | OUTPATIENT
Start: 2021-04-23 | End: 2021-04-24 | Stop reason: HOSPADM

## 2021-04-23 RX ORDER — SODIUM CHLORIDE 9 MG/ML
100 INJECTION, SOLUTION INTRAVENOUS CONTINUOUS
Status: DISCONTINUED | OUTPATIENT
Start: 2021-04-23 | End: 2021-04-24 | Stop reason: HOSPADM

## 2021-04-23 RX ORDER — HYDROCODONE BITARTRATE AND ACETAMINOPHEN 5; 325 MG/1; MG/1
1 TABLET ORAL EVERY 4 HOURS PRN
Status: DISCONTINUED | OUTPATIENT
Start: 2021-04-23 | End: 2021-04-24 | Stop reason: HOSPADM

## 2021-04-23 RX ORDER — FERROUS SULFATE TAB EC 324 MG (65 MG FE EQUIVALENT) 324 (65 FE) MG
324 TABLET DELAYED RESPONSE ORAL
Status: DISCONTINUED | OUTPATIENT
Start: 2021-04-24 | End: 2021-04-24 | Stop reason: HOSPADM

## 2021-04-23 RX ORDER — LABETALOL HYDROCHLORIDE 5 MG/ML
20 INJECTION, SOLUTION INTRAVENOUS ONCE
Status: DISCONTINUED | OUTPATIENT
Start: 2021-04-23 | End: 2021-04-24 | Stop reason: HOSPADM

## 2021-04-23 RX ORDER — FENTANYL CITRATE 50 UG/ML
INJECTION, SOLUTION INTRAMUSCULAR; INTRAVENOUS AS NEEDED
Status: DISCONTINUED | OUTPATIENT
Start: 2021-04-23 | End: 2021-04-23 | Stop reason: HOSPADM

## 2021-04-23 RX ORDER — PANTOPRAZOLE SODIUM 40 MG/1
40 TABLET, DELAYED RELEASE ORAL NIGHTLY
Status: DISCONTINUED | OUTPATIENT
Start: 2021-04-23 | End: 2021-04-24 | Stop reason: HOSPADM

## 2021-04-23 RX ORDER — MORPHINE SULFATE 2 MG/ML
1 INJECTION, SOLUTION INTRAMUSCULAR; INTRAVENOUS EVERY 4 HOURS PRN
Status: DISCONTINUED | OUTPATIENT
Start: 2021-04-23 | End: 2021-04-24 | Stop reason: HOSPADM

## 2021-04-23 RX ORDER — DULOXETIN HYDROCHLORIDE 20 MG/1
20 CAPSULE, DELAYED RELEASE ORAL DAILY
Status: DISCONTINUED | OUTPATIENT
Start: 2021-04-23 | End: 2021-04-24 | Stop reason: HOSPADM

## 2021-04-23 RX ORDER — ASPIRIN 325 MG
325 TABLET ORAL ONCE
Status: COMPLETED | OUTPATIENT
Start: 2021-04-23 | End: 2021-04-23

## 2021-04-23 RX ORDER — MONTELUKAST SODIUM 10 MG/1
10 TABLET ORAL NIGHTLY
Status: DISCONTINUED | OUTPATIENT
Start: 2021-04-23 | End: 2021-04-24 | Stop reason: HOSPADM

## 2021-04-23 RX ORDER — ONDANSETRON 4 MG/1
4 TABLET, FILM COATED ORAL EVERY 6 HOURS PRN
Status: DISCONTINUED | OUTPATIENT
Start: 2021-04-23 | End: 2021-04-24 | Stop reason: HOSPADM

## 2021-04-23 RX ORDER — HEPARIN SODIUM 5000 [USP'U]/ML
4000 INJECTION, SOLUTION INTRAVENOUS; SUBCUTANEOUS AS NEEDED
Status: DISCONTINUED | OUTPATIENT
Start: 2021-04-23 | End: 2021-04-24 | Stop reason: HOSPADM

## 2021-04-23 RX ORDER — OXYBUTYNIN CHLORIDE 5 MG/1
5 TABLET, EXTENDED RELEASE ORAL DAILY
Status: DISCONTINUED | OUTPATIENT
Start: 2021-04-23 | End: 2021-04-24 | Stop reason: HOSPADM

## 2021-04-23 RX ORDER — MIDAZOLAM HYDROCHLORIDE 1 MG/ML
INJECTION INTRAMUSCULAR; INTRAVENOUS AS NEEDED
Status: DISCONTINUED | OUTPATIENT
Start: 2021-04-23 | End: 2021-04-23 | Stop reason: HOSPADM

## 2021-04-23 RX ORDER — ALBUTEROL SULFATE 2.5 MG/3ML
2.5 SOLUTION RESPIRATORY (INHALATION) EVERY 4 HOURS PRN
Status: DISCONTINUED | OUTPATIENT
Start: 2021-04-23 | End: 2021-04-24 | Stop reason: HOSPADM

## 2021-04-23 RX ORDER — HEPARIN SODIUM 5000 [USP'U]/ML
30 INJECTION, SOLUTION INTRAVENOUS; SUBCUTANEOUS AS NEEDED
Status: DISCONTINUED | OUTPATIENT
Start: 2021-04-23 | End: 2021-04-24 | Stop reason: HOSPADM

## 2021-04-23 RX ORDER — NICOTINE 21 MG/24HR
1 PATCH, TRANSDERMAL 24 HOURS TRANSDERMAL EVERY 24 HOURS
Status: DISCONTINUED | OUTPATIENT
Start: 2021-04-23 | End: 2021-04-24 | Stop reason: HOSPADM

## 2021-04-23 RX ORDER — NICOTINE POLACRILEX 4 MG
15 LOZENGE BUCCAL
Status: DISCONTINUED | OUTPATIENT
Start: 2021-04-23 | End: 2021-04-24 | Stop reason: HOSPADM

## 2021-04-23 RX ADMIN — ISOSORBIDE MONONITRATE 30 MG: 30 TABLET, EXTENDED RELEASE ORAL at 18:21

## 2021-04-23 RX ADMIN — CETIRIZINE HYDROCHLORIDE 10 MG: 10 TABLET, FILM COATED ORAL at 18:35

## 2021-04-23 RX ADMIN — GABAPENTIN 400 MG: 400 CAPSULE ORAL at 18:20

## 2021-04-23 RX ADMIN — OXYBUTYNIN CHLORIDE 5 MG: 5 TABLET, EXTENDED RELEASE ORAL at 18:36

## 2021-04-23 RX ADMIN — SODIUM CHLORIDE 100 ML/HR: 900 INJECTION, SOLUTION INTRAVENOUS at 17:27

## 2021-04-23 RX ADMIN — SUCRALFATE 1 G: 1 TABLET ORAL at 18:37

## 2021-04-23 RX ADMIN — MONTELUKAST SODIUM 10 MG: 10 TABLET, FILM COATED ORAL at 20:23

## 2021-04-23 RX ADMIN — Medication 10 ML: at 20:22

## 2021-04-23 RX ADMIN — LISINOPRIL 10 MG: 10 TABLET ORAL at 18:22

## 2021-04-23 RX ADMIN — SUCRALFATE 1 G: 1 TABLET ORAL at 20:22

## 2021-04-23 RX ADMIN — ASPIRIN 325 MG: 325 TABLET, COATED ORAL at 11:29

## 2021-04-23 RX ADMIN — METOPROLOL TARTRATE 100 MG: 100 TABLET, FILM COATED ORAL at 20:23

## 2021-04-23 RX ADMIN — HEPARIN SODIUM 4000 UNITS: 5000 INJECTION, SOLUTION INTRAVENOUS; SUBCUTANEOUS at 17:26

## 2021-04-23 RX ADMIN — NYSTATIN: 100000 POWDER TOPICAL at 18:28

## 2021-04-23 RX ADMIN — DULOXETINE 20 MG: 20 CAPSULE, DELAYED RELEASE ORAL at 18:35

## 2021-04-23 RX ADMIN — NITROGLYCERIN 5 MCG/MIN: 20 INJECTION INTRAVENOUS at 12:48

## 2021-04-23 RX ADMIN — MORPHINE SULFATE 1 MG: 2 INJECTION, SOLUTION INTRAMUSCULAR; INTRAVENOUS at 20:19

## 2021-04-23 RX ADMIN — HEPARIN SODIUM 10 UNITS/KG/HR: 10000 INJECTION, SOLUTION INTRAVENOUS at 17:27

## 2021-04-23 RX ADMIN — POTASSIUM CHLORIDE 10 MEQ: 750 CAPSULE, EXTENDED RELEASE ORAL at 18:37

## 2021-04-23 RX ADMIN — NICOTINE 1 PATCH: 14 PATCH, EXTENDED RELEASE TRANSDERMAL at 18:36

## 2021-04-23 RX ADMIN — PANTOPRAZOLE SODIUM 40 MG: 40 TABLET, DELAYED RELEASE ORAL at 20:22

## 2021-04-23 RX ADMIN — NYSTATIN: 100000 POWDER TOPICAL at 20:21

## 2021-04-23 RX ADMIN — ATORVASTATIN CALCIUM 40 MG: 40 TABLET, FILM COATED ORAL at 18:20

## 2021-04-23 RX ADMIN — NITROGLYCERIN 0.4 MG: 0.4 TABLET, ORALLY DISINTEGRATING SUBLINGUAL at 11:58

## 2021-04-23 RX ADMIN — SODIUM BICARBONATE 650 MG TABLET 650 MG: at 20:22

## 2021-04-23 NOTE — OUTREACH NOTE
Medical Week 2 Survey      Responses   [facility] patient discharged from?  [place]   Does the patient have one of the following disease processes/diagnoses(primary or secondary)?  Other   Week 2 attempt successful?  No   Revoke  Readmitted          Jael Faith RN

## 2021-04-24 ENCOUNTER — APPOINTMENT (OUTPATIENT)
Dept: CARDIOLOGY | Facility: HOSPITAL | Age: 61
End: 2021-04-24

## 2021-04-24 ENCOUNTER — HOSPITAL ENCOUNTER (INPATIENT)
Facility: HOSPITAL | Age: 61
LOS: 7 days | Discharge: HOME HEALTH | End: 2021-05-01
Attending: THORACIC SURGERY (CARDIOTHORACIC VASCULAR SURGERY) | Admitting: THORACIC SURGERY (CARDIOTHORACIC VASCULAR SURGERY)

## 2021-04-24 ENCOUNTER — APPOINTMENT (OUTPATIENT)
Dept: GENERAL RADIOLOGY | Facility: HOSPITAL | Age: 61
End: 2021-04-24

## 2021-04-24 DIAGNOSIS — I25.110 CORONARY ARTERY DISEASE INVOLVING NATIVE CORONARY ARTERY OF NATIVE HEART WITH UNSTABLE ANGINA PECTORIS (HCC): ICD-10-CM

## 2021-04-24 DIAGNOSIS — I21.4 NSTEMI, INITIAL EPISODE OF CARE (HCC): Primary | ICD-10-CM

## 2021-04-24 PROBLEM — I25.10 CAD (CORONARY ARTERY DISEASE): Status: ACTIVE | Noted: 2021-04-24

## 2021-04-24 LAB
ALBUMIN SERPL-MCNC: 3.2 G/DL (ref 3.5–5.2)
ALBUMIN/GLOB SERPL: 0.7 G/DL
ALP SERPL-CCNC: 153 U/L (ref 39–117)
ALT SERPL W P-5'-P-CCNC: 13 U/L (ref 1–33)
ANION GAP SERPL CALCULATED.3IONS-SCNC: 10.7 MMOL/L (ref 5–15)
AORTIC ARCH: 2.8 CM
AORTIC DIMENSIONLESS INDEX: 0.8 (DI)
APTT PPP: 36.6 SECONDS (ref 22.7–35.4)
APTT PPP: 39.9 SECONDS (ref 22.7–35.4)
APTT PPP: 59.3 SECONDS (ref 22.7–35.4)
ASCENDING AORTA: 2.5 CM
AST SERPL-CCNC: 20 U/L (ref 1–32)
BACTERIA UR QL AUTO: ABNORMAL /HPF
BASOPHILS # BLD AUTO: 0.13 10*3/MM3 (ref 0–0.2)
BASOPHILS NFR BLD AUTO: 1.4 % (ref 0–1.5)
BH CV ECHO MEAS - ACS: 1.7 CM
BH CV ECHO MEAS - AO ARCH DIAM (PROXIMAL TRANS.): 2.8 CM
BH CV ECHO MEAS - AO MAX PG (FULL): 6.1 MMHG
BH CV ECHO MEAS - AO MAX PG: 10.4 MMHG
BH CV ECHO MEAS - AO MEAN PG (FULL): 3 MMHG
BH CV ECHO MEAS - AO MEAN PG: 5 MMHG
BH CV ECHO MEAS - AO ROOT AREA (BSA CORRECTED): 1.1
BH CV ECHO MEAS - AO ROOT AREA: 4.9 CM^2
BH CV ECHO MEAS - AO ROOT DIAM: 2.5 CM
BH CV ECHO MEAS - AO V2 MAX: 161 CM/SEC
BH CV ECHO MEAS - AO V2 MEAN: 102 CM/SEC
BH CV ECHO MEAS - AO V2 VTI: 31.6 CM
BH CV ECHO MEAS - ASC AORTA: 2.5 CM
BH CV ECHO MEAS - AVA(I,A): 2.4 CM^2
BH CV ECHO MEAS - AVA(I,D): 2.4 CM^2
BH CV ECHO MEAS - AVA(V,A): 2 CM^2
BH CV ECHO MEAS - AVA(V,D): 2 CM^2
BH CV ECHO MEAS - BSA(HAYCOCK): 2.4 M^2
BH CV ECHO MEAS - BSA: 2.2 M^2
BH CV ECHO MEAS - BZI_BMI: 50.1 KILOGRAMS/M^2
BH CV ECHO MEAS - BZI_METRIC_HEIGHT: 157.5 CM
BH CV ECHO MEAS - BZI_METRIC_WEIGHT: 124.3 KG
BH CV ECHO MEAS - EDV(CUBED): 64 ML
BH CV ECHO MEAS - EDV(MOD-SP2): 120 ML
BH CV ECHO MEAS - EDV(MOD-SP4): 152 ML
BH CV ECHO MEAS - EDV(TEICH): 70 ML
BH CV ECHO MEAS - EF(CUBED): 57.8 %
BH CV ECHO MEAS - EF(MOD-BP): 61.3 %
BH CV ECHO MEAS - EF(MOD-SP2): 61.7 %
BH CV ECHO MEAS - EF(MOD-SP4): 59.9 %
BH CV ECHO MEAS - EF(TEICH): 50 %
BH CV ECHO MEAS - ESV(CUBED): 27 ML
BH CV ECHO MEAS - ESV(MOD-SP2): 46 ML
BH CV ECHO MEAS - ESV(MOD-SP4): 61 ML
BH CV ECHO MEAS - ESV(TEICH): 35 ML
BH CV ECHO MEAS - FS: 25 %
BH CV ECHO MEAS - IVS/LVPW: 0.87
BH CV ECHO MEAS - IVSD: 1.3 CM
BH CV ECHO MEAS - LAT PEAK E' VEL: 7.6 CM/SEC
BH CV ECHO MEAS - LV DIASTOLIC VOL/BSA (35-75): 69.6 ML/M^2
BH CV ECHO MEAS - LV MASS(C)D: 209 GRAMS
BH CV ECHO MEAS - LV MASS(C)DI: 95.6 GRAMS/M^2
BH CV ECHO MEAS - LV MAX PG: 4.2 MMHG
BH CV ECHO MEAS - LV MEAN PG: 2 MMHG
BH CV ECHO MEAS - LV SYSTOLIC VOL/BSA (12-30): 27.9 ML/M^2
BH CV ECHO MEAS - LV V1 MAX: 103 CM/SEC
BH CV ECHO MEAS - LV V1 MEAN: 72.4 CM/SEC
BH CV ECHO MEAS - LV V1 VTI: 24.6 CM
BH CV ECHO MEAS - LVIDD: 4 CM
BH CV ECHO MEAS - LVIDS: 3 CM
BH CV ECHO MEAS - LVLD AP2: 7.9 CM
BH CV ECHO MEAS - LVLD AP4: 8.3 CM
BH CV ECHO MEAS - LVLS AP2: 6.9 CM
BH CV ECHO MEAS - LVLS AP4: 7 CM
BH CV ECHO MEAS - LVOT AREA (M): 3.1 CM^2
BH CV ECHO MEAS - LVOT AREA: 3.1 CM^2
BH CV ECHO MEAS - LVOT DIAM: 2 CM
BH CV ECHO MEAS - LVPWD: 1.5 CM
BH CV ECHO MEAS - MED PEAK E' VEL: 7.1 CM/SEC
BH CV ECHO MEAS - MV A DUR: 0.22 SEC
BH CV ECHO MEAS - MV A MAX VEL: 111 CM/SEC
BH CV ECHO MEAS - MV DEC SLOPE: 427 CM/SEC^2
BH CV ECHO MEAS - MV DEC TIME: 0.21 SEC
BH CV ECHO MEAS - MV E MAX VEL: 120 CM/SEC
BH CV ECHO MEAS - MV E/A: 1.1
BH CV ECHO MEAS - MV MAX PG: 5.1 MMHG
BH CV ECHO MEAS - MV MEAN PG: 2 MMHG
BH CV ECHO MEAS - MV P1/2T MAX VEL: 111 CM/SEC
BH CV ECHO MEAS - MV P1/2T: 76.1 MSEC
BH CV ECHO MEAS - MV V2 MAX: 113 CM/SEC
BH CV ECHO MEAS - MV V2 MEAN: 72.4 CM/SEC
BH CV ECHO MEAS - MV V2 VTI: 32.3 CM
BH CV ECHO MEAS - MVA P1/2T LCG: 2 CM^2
BH CV ECHO MEAS - MVA(P1/2T): 2.9 CM^2
BH CV ECHO MEAS - MVA(VTI): 2.4 CM^2
BH CV ECHO MEAS - PA ACC TIME: 0.09 SEC
BH CV ECHO MEAS - PA MAX PG (FULL): 3.5 MMHG
BH CV ECHO MEAS - PA MAX PG: 5.5 MMHG
BH CV ECHO MEAS - PA PR(ACCEL): 38.5 MMHG
BH CV ECHO MEAS - PA V2 MAX: 117 CM/SEC
BH CV ECHO MEAS - PULM A REVS DUR: 0.14 SEC
BH CV ECHO MEAS - PULM A REVS VEL: 26.9 CM/SEC
BH CV ECHO MEAS - PULM DIAS VEL: 74.3 CM/SEC
BH CV ECHO MEAS - PULM S/D: 0.5
BH CV ECHO MEAS - PULM SYS VEL: 37 CM/SEC
BH CV ECHO MEAS - PVA(V,A): 2.5 CM^2
BH CV ECHO MEAS - PVA(V,D): 2.5 CM^2
BH CV ECHO MEAS - QP/QS: 0.86
BH CV ECHO MEAS - RV MAX PG: 2 MMHG
BH CV ECHO MEAS - RV MEAN PG: 1 MMHG
BH CV ECHO MEAS - RV V1 MAX: 70.5 CM/SEC
BH CV ECHO MEAS - RV V1 MEAN: 48.1 CM/SEC
BH CV ECHO MEAS - RV V1 VTI: 16 CM
BH CV ECHO MEAS - RVOT AREA: 4.2 CM^2
BH CV ECHO MEAS - RVOT DIAM: 2.3 CM
BH CV ECHO MEAS - SI(AO): 71 ML/M^2
BH CV ECHO MEAS - SI(CUBED): 16.9 ML/M^2
BH CV ECHO MEAS - SI(LVOT): 35.4 ML/M^2
BH CV ECHO MEAS - SI(MOD-SP2): 33.9 ML/M^2
BH CV ECHO MEAS - SI(MOD-SP4): 41.6 ML/M^2
BH CV ECHO MEAS - SI(TEICH): 16 ML/M^2
BH CV ECHO MEAS - SV(AO): 155.1 ML
BH CV ECHO MEAS - SV(CUBED): 37 ML
BH CV ECHO MEAS - SV(LVOT): 77.3 ML
BH CV ECHO MEAS - SV(MOD-SP2): 74 ML
BH CV ECHO MEAS - SV(MOD-SP4): 91 ML
BH CV ECHO MEAS - SV(RVOT): 66.5 ML
BH CV ECHO MEAS - SV(TEICH): 35 ML
BH CV ECHO MEAS - TAPSE (>1.6): 1.5 CM
BH CV ECHO MEASUREMENTS AVERAGE E/E' RATIO: 16.33
BH CV XLRA - RV BASE: 3.4 CM
BH CV XLRA - RV LENGTH: 7.9 CM
BH CV XLRA - RV MID: 2.9 CM
BH CV XLRA - TDI S': 10.6 CM/SEC
BH CV XLRA MEAS - DIST GSV CALF DIST RIGHT: 0.32 CM
BH CV XLRA MEAS - DIST GSV THIGH DIST RIGHT: 0.45 CM
BH CV XLRA MEAS - DIST LSV CALF DIST RIGHT: 0.2 CM
BH CV XLRA MEAS - GSV ANKLE DIST RIGHT: 0.33 CM
BH CV XLRA MEAS - GSV KNEE DIST RIGHT: 0.44 CM
BH CV XLRA MEAS - GSV ORIGIN DIST RIGHT: 0.62 CM
BH CV XLRA MEAS - MID GSV CALF RIGHT: 0.29 CM
BH CV XLRA MEAS - MID GSV THIGH  RIGHT: 0.53 CM
BH CV XLRA MEAS - MID LSV CALF DIST RIGHT: 0.27 CM
BH CV XLRA MEAS - PROX GSV CALF DIST RIGHT: 0.35 CM
BH CV XLRA MEAS - PROX GSV THIGH  RIGHT: 0.56 CM
BH CV XLRA MEAS - PROX LSV CALF DIST RIGHT: 0.27 CM
BH CV XLRA MEAS LEFT DIST CCA EDV: -16.9 CM/SEC
BH CV XLRA MEAS LEFT DIST CCA PSV: -48.2 CM/SEC
BH CV XLRA MEAS LEFT DIST ICA EDV: -21.7 CM/SEC
BH CV XLRA MEAS LEFT DIST ICA PSV: -75 CM/SEC
BH CV XLRA MEAS LEFT ICA/CCA RATIO: 2.2
BH CV XLRA MEAS LEFT MID ICA EDV: -18.9 CM/SEC
BH CV XLRA MEAS LEFT MID ICA PSV: -89 CM/SEC
BH CV XLRA MEAS LEFT PROX CCA EDV: 17.7 CM/SEC
BH CV XLRA MEAS LEFT PROX CCA PSV: 66.8 CM/SEC
BH CV XLRA MEAS LEFT PROX ECA EDV: 15.1 CM/SEC
BH CV XLRA MEAS LEFT PROX ECA PSV: 255.2 CM/SEC
BH CV XLRA MEAS LEFT PROX ICA EDV: -32.2 CM/SEC
BH CV XLRA MEAS LEFT PROX ICA PSV: -107.2 CM/SEC
BH CV XLRA MEAS LEFT PROX SCLA PSV: 178.4 CM/SEC
BH CV XLRA MEAS LEFT VERTEBRAL A EDV: 13.6 CM/SEC
BH CV XLRA MEAS LEFT VERTEBRAL A PSV: 61 CM/SEC
BH CV XLRA MEAS RIGHT DIST CCA EDV: 16.5 CM/SEC
BH CV XLRA MEAS RIGHT DIST CCA PSV: 79 CM/SEC
BH CV XLRA MEAS RIGHT DIST ICA EDV: -24.7 CM/SEC
BH CV XLRA MEAS RIGHT DIST ICA PSV: -70.8 CM/SEC
BH CV XLRA MEAS RIGHT ICA/CCA RATIO: 1.04
BH CV XLRA MEAS RIGHT MID ICA EDV: -24.1 CM/SEC
BH CV XLRA MEAS RIGHT MID ICA PSV: -82.3 CM/SEC
BH CV XLRA MEAS RIGHT PROX CCA EDV: 12.4 CM/SEC
BH CV XLRA MEAS RIGHT PROX CCA PSV: 86.4 CM/SEC
BH CV XLRA MEAS RIGHT PROX ECA EDV: 8.7 CM/SEC
BH CV XLRA MEAS RIGHT PROX ECA PSV: 87.6 CM/SEC
BH CV XLRA MEAS RIGHT PROX ICA EDV: -23 CM/SEC
BH CV XLRA MEAS RIGHT PROX ICA PSV: -80.7 CM/SEC
BH CV XLRA MEAS RIGHT PROX SCLA PSV: 157.8 CM/SEC
BH CV XLRA MEAS RIGHT VERTEBRAL A EDV: -13.7 CM/SEC
BH CV XLRA MEAS RIGHT VERTEBRAL A PSV: -51.3 CM/SEC
BILIRUB SERPL-MCNC: 0.2 MG/DL (ref 0–1.2)
BILIRUB UR QL STRIP: NEGATIVE
BUN SERPL-MCNC: 24 MG/DL (ref 8–23)
BUN/CREAT SERPL: 12.4 (ref 7–25)
CALCIUM SPEC-SCNC: 9.1 MG/DL (ref 8.6–10.5)
CHLORIDE SERPL-SCNC: 106 MMOL/L (ref 98–107)
CLARITY UR: CLEAR
CLOSE TME COLL+ADP + EPINEP PNL BLD: 35 % (ref 86–100)
CO2 SERPL-SCNC: 21.3 MMOL/L (ref 22–29)
COLOR UR: YELLOW
CREAT SERPL-MCNC: 1.94 MG/DL (ref 0.57–1)
DEPRECATED RDW RBC AUTO: 45.4 FL (ref 37–54)
EOSINOPHIL # BLD AUTO: 0.32 10*3/MM3 (ref 0–0.4)
EOSINOPHIL NFR BLD AUTO: 3.4 % (ref 0.3–6.2)
ERYTHROCYTE [DISTWIDTH] IN BLOOD BY AUTOMATED COUNT: 14.7 % (ref 12.3–15.4)
GFR SERPL CREATININE-BSD FRML MDRD: 26 ML/MIN/1.73
GLOBULIN UR ELPH-MCNC: 4.4 GM/DL
GLUCOSE BLDC GLUCOMTR-MCNC: 144 MG/DL (ref 70–130)
GLUCOSE BLDC GLUCOMTR-MCNC: 200 MG/DL (ref 70–130)
GLUCOSE BLDC GLUCOMTR-MCNC: 240 MG/DL (ref 70–130)
GLUCOSE BLDC GLUCOMTR-MCNC: 93 MG/DL (ref 70–130)
GLUCOSE SERPL-MCNC: 85 MG/DL (ref 65–99)
GLUCOSE UR STRIP-MCNC: ABNORMAL MG/DL
HBA1C MFR BLD: 11 % (ref 4.8–5.6)
HCT VFR BLD AUTO: 28.8 % (ref 34–46.6)
HGB BLD-MCNC: 9.3 G/DL (ref 12–15.9)
HGB UR QL STRIP.AUTO: ABNORMAL
HYALINE CASTS UR QL AUTO: ABNORMAL /LPF
IMM GRANULOCYTES # BLD AUTO: 0.09 10*3/MM3 (ref 0–0.05)
IMM GRANULOCYTES NFR BLD AUTO: 1 % (ref 0–0.5)
INR PPP: 1.07 (ref 0.9–1.1)
KETONES UR QL STRIP: NEGATIVE
LEFT ATRIUM VOLUME INDEX: 31 ML/M2
LEUKOCYTE ESTERASE UR QL STRIP.AUTO: NEGATIVE
LYMPHOCYTES # BLD AUTO: 2.51 10*3/MM3 (ref 0.7–3.1)
LYMPHOCYTES NFR BLD AUTO: 26.9 % (ref 19.6–45.3)
MAXIMAL PREDICTED HEART RATE: 158 BPM
MCH RBC QN AUTO: 27.7 PG (ref 26.6–33)
MCHC RBC AUTO-ENTMCNC: 32.3 G/DL (ref 31.5–35.7)
MCV RBC AUTO: 85.7 FL (ref 79–97)
MONOCYTES # BLD AUTO: 0.64 10*3/MM3 (ref 0.1–0.9)
MONOCYTES NFR BLD AUTO: 6.9 % (ref 5–12)
NEUTROPHILS NFR BLD AUTO: 5.64 10*3/MM3 (ref 1.7–7)
NEUTROPHILS NFR BLD AUTO: 60.4 % (ref 42.7–76)
NITRITE UR QL STRIP: NEGATIVE
NRBC BLD AUTO-RTO: 0.1 /100 WBC (ref 0–0.2)
PH UR STRIP.AUTO: 7.5 [PH] (ref 5–8)
PLATELET # BLD AUTO: 367 10*3/MM3 (ref 140–450)
PMV BLD AUTO: 8.8 FL (ref 6–12)
POTASSIUM SERPL-SCNC: 4.1 MMOL/L (ref 3.5–5.2)
PROT SERPL-MCNC: 7.6 G/DL (ref 6–8.5)
PROT UR QL STRIP: ABNORMAL
PROTHROMBIN TIME: 13.8 SECONDS (ref 11.7–14.2)
QT INTERVAL: 422 MS
RBC # BLD AUTO: 3.36 10*6/MM3 (ref 3.77–5.28)
RBC # UR: ABNORMAL /HPF
REF LAB TEST METHOD: ABNORMAL
SINUS: 2.4 CM
SODIUM SERPL-SCNC: 138 MMOL/L (ref 136–145)
SP GR UR STRIP: 1.02 (ref 1–1.03)
SQUAMOUS #/AREA URNS HPF: ABNORMAL /HPF
STJ: 2.3 CM
STRESS TARGET HR: 134 BPM
TROPONIN T SERPL-MCNC: 0.37 NG/ML (ref 0–0.03)
TSH SERPL DL<=0.05 MIU/L-ACNC: 1.12 UIU/ML (ref 0.27–4.2)
UROBILINOGEN UR QL STRIP: ABNORMAL
WBC # BLD AUTO: 9.33 10*3/MM3 (ref 3.4–10.8)
WBC UR QL AUTO: ABNORMAL /HPF

## 2021-04-24 PROCEDURE — 81001 URINALYSIS AUTO W/SCOPE: CPT | Performed by: THORACIC SURGERY (CARDIOTHORACIC VASCULAR SURGERY)

## 2021-04-24 PROCEDURE — 85730 THROMBOPLASTIN TIME PARTIAL: CPT | Performed by: THORACIC SURGERY (CARDIOTHORACIC VASCULAR SURGERY)

## 2021-04-24 PROCEDURE — 84443 ASSAY THYROID STIM HORMONE: CPT | Performed by: THORACIC SURGERY (CARDIOTHORACIC VASCULAR SURGERY)

## 2021-04-24 PROCEDURE — 82962 GLUCOSE BLOOD TEST: CPT

## 2021-04-24 PROCEDURE — 93306 TTE W/DOPPLER COMPLETE: CPT

## 2021-04-24 PROCEDURE — 63710000001 INSULIN LISPRO (HUMAN) PER 5 UNITS: Performed by: INTERNAL MEDICINE

## 2021-04-24 PROCEDURE — 84484 ASSAY OF TROPONIN QUANT: CPT | Performed by: INTERNAL MEDICINE

## 2021-04-24 PROCEDURE — 25010000002 HEPARIN (PORCINE) PER 1000 UNITS: Performed by: THORACIC SURGERY (CARDIOTHORACIC VASCULAR SURGERY)

## 2021-04-24 PROCEDURE — 85025 COMPLETE CBC W/AUTO DIFF WBC: CPT | Performed by: THORACIC SURGERY (CARDIOTHORACIC VASCULAR SURGERY)

## 2021-04-24 PROCEDURE — 83036 HEMOGLOBIN GLYCOSYLATED A1C: CPT | Performed by: THORACIC SURGERY (CARDIOTHORACIC VASCULAR SURGERY)

## 2021-04-24 PROCEDURE — 93005 ELECTROCARDIOGRAM TRACING: CPT | Performed by: INTERNAL MEDICINE

## 2021-04-24 PROCEDURE — 71045 X-RAY EXAM CHEST 1 VIEW: CPT

## 2021-04-24 PROCEDURE — 93306 TTE W/DOPPLER COMPLETE: CPT | Performed by: INTERNAL MEDICINE

## 2021-04-24 PROCEDURE — 63710000001 INSULIN GLARGINE PER 5 UNITS: Performed by: INTERNAL MEDICINE

## 2021-04-24 PROCEDURE — 93970 EXTREMITY STUDY: CPT

## 2021-04-24 PROCEDURE — 25010000002 PERFLUTREN (DEFINITY) 8.476 MG IN SODIUM CHLORIDE (PF) 0.9 % 10 ML INJECTION: Performed by: INTERNAL MEDICINE

## 2021-04-24 PROCEDURE — 93005 ELECTROCARDIOGRAM TRACING: CPT | Performed by: THORACIC SURGERY (CARDIOTHORACIC VASCULAR SURGERY)

## 2021-04-24 PROCEDURE — 99222 1ST HOSP IP/OBS MODERATE 55: CPT | Performed by: INTERNAL MEDICINE

## 2021-04-24 PROCEDURE — 85610 PROTHROMBIN TIME: CPT | Performed by: THORACIC SURGERY (CARDIOTHORACIC VASCULAR SURGERY)

## 2021-04-24 PROCEDURE — 93880 EXTRACRANIAL BILAT STUDY: CPT

## 2021-04-24 PROCEDURE — 93971 EXTREMITY STUDY: CPT

## 2021-04-24 PROCEDURE — 93010 ELECTROCARDIOGRAM REPORT: CPT | Performed by: INTERNAL MEDICINE

## 2021-04-24 PROCEDURE — 85576 BLOOD PLATELET AGGREGATION: CPT | Performed by: THORACIC SURGERY (CARDIOTHORACIC VASCULAR SURGERY)

## 2021-04-24 PROCEDURE — 25010000002 ONDANSETRON PER 1 MG: Performed by: THORACIC SURGERY (CARDIOTHORACIC VASCULAR SURGERY)

## 2021-04-24 PROCEDURE — 80053 COMPREHEN METABOLIC PANEL: CPT | Performed by: THORACIC SURGERY (CARDIOTHORACIC VASCULAR SURGERY)

## 2021-04-24 RX ORDER — INSULIN LISPRO 100 [IU]/ML
0-24 INJECTION, SOLUTION INTRAVENOUS; SUBCUTANEOUS
Status: DISCONTINUED | OUTPATIENT
Start: 2021-04-24 | End: 2021-05-01 | Stop reason: HOSPADM

## 2021-04-24 RX ORDER — POLYETHYLENE GLYCOL 3350 17 G/17G
17 POWDER, FOR SOLUTION ORAL DAILY PRN
Status: DISCONTINUED | OUTPATIENT
Start: 2021-04-24 | End: 2021-05-01 | Stop reason: HOSPADM

## 2021-04-24 RX ORDER — HYDRALAZINE HYDROCHLORIDE 20 MG/ML
10 INJECTION INTRAMUSCULAR; INTRAVENOUS EVERY 6 HOURS PRN
Status: DISCONTINUED | OUTPATIENT
Start: 2021-04-24 | End: 2021-05-01 | Stop reason: HOSPADM

## 2021-04-24 RX ORDER — IPRATROPIUM BROMIDE AND ALBUTEROL SULFATE 2.5; .5 MG/3ML; MG/3ML
3 SOLUTION RESPIRATORY (INHALATION) EVERY 4 HOURS PRN
Status: DISCONTINUED | OUTPATIENT
Start: 2021-04-24 | End: 2021-05-01 | Stop reason: HOSPADM

## 2021-04-24 RX ORDER — OXYBUTYNIN CHLORIDE 5 MG/1
5 TABLET, EXTENDED RELEASE ORAL DAILY
Status: DISCONTINUED | OUTPATIENT
Start: 2021-04-24 | End: 2021-05-01 | Stop reason: HOSPADM

## 2021-04-24 RX ORDER — SODIUM CHLORIDE 0.9 % (FLUSH) 0.9 %
10 SYRINGE (ML) INJECTION AS NEEDED
Status: DISCONTINUED | OUTPATIENT
Start: 2021-04-24 | End: 2021-05-01 | Stop reason: HOSPADM

## 2021-04-24 RX ORDER — AMLODIPINE BESYLATE 5 MG/1
5 TABLET ORAL
Status: DISCONTINUED | OUTPATIENT
Start: 2021-04-24 | End: 2021-04-25

## 2021-04-24 RX ORDER — GABAPENTIN 400 MG/1
800 CAPSULE ORAL EVERY 8 HOURS SCHEDULED
Status: DISCONTINUED | OUTPATIENT
Start: 2021-04-24 | End: 2021-04-25

## 2021-04-24 RX ORDER — NICOTINE 21 MG/24HR
1 PATCH, TRANSDERMAL 24 HOURS TRANSDERMAL
Status: DISCONTINUED | OUTPATIENT
Start: 2021-04-24 | End: 2021-05-01 | Stop reason: HOSPADM

## 2021-04-24 RX ORDER — IPRATROPIUM BROMIDE AND ALBUTEROL SULFATE 2.5; .5 MG/3ML; MG/3ML
3 SOLUTION RESPIRATORY (INHALATION)
Status: DISCONTINUED | OUTPATIENT
Start: 2021-04-24 | End: 2021-05-01 | Stop reason: HOSPADM

## 2021-04-24 RX ORDER — SUCRALFATE 1 G/1
1 TABLET ORAL 4 TIMES DAILY
Status: DISCONTINUED | OUTPATIENT
Start: 2021-04-24 | End: 2021-05-01 | Stop reason: HOSPADM

## 2021-04-24 RX ORDER — ATORVASTATIN CALCIUM 20 MG/1
40 TABLET, FILM COATED ORAL DAILY
Status: DISCONTINUED | OUTPATIENT
Start: 2021-04-24 | End: 2021-05-01 | Stop reason: HOSPADM

## 2021-04-24 RX ORDER — ONDANSETRON 2 MG/ML
4 INJECTION INTRAMUSCULAR; INTRAVENOUS EVERY 6 HOURS PRN
Status: DISCONTINUED | OUTPATIENT
Start: 2021-04-24 | End: 2021-05-01 | Stop reason: HOSPADM

## 2021-04-24 RX ORDER — SODIUM CHLORIDE 0.9 % (FLUSH) 0.9 %
10 SYRINGE (ML) INJECTION EVERY 12 HOURS SCHEDULED
Status: DISCONTINUED | OUTPATIENT
Start: 2021-04-24 | End: 2021-05-01 | Stop reason: HOSPADM

## 2021-04-24 RX ORDER — PANTOPRAZOLE SODIUM 40 MG/1
40 TABLET, DELAYED RELEASE ORAL NIGHTLY
Status: DISCONTINUED | OUTPATIENT
Start: 2021-04-24 | End: 2021-05-01 | Stop reason: HOSPADM

## 2021-04-24 RX ORDER — BISACODYL 5 MG/1
5 TABLET, DELAYED RELEASE ORAL DAILY PRN
Status: DISCONTINUED | OUTPATIENT
Start: 2021-04-24 | End: 2021-05-01 | Stop reason: HOSPADM

## 2021-04-24 RX ORDER — DULOXETIN HYDROCHLORIDE 20 MG/1
20 CAPSULE, DELAYED RELEASE ORAL DAILY
Status: DISCONTINUED | OUTPATIENT
Start: 2021-04-24 | End: 2021-05-01 | Stop reason: HOSPADM

## 2021-04-24 RX ORDER — NICOTINE POLACRILEX 4 MG
15 LOZENGE BUCCAL
Status: DISCONTINUED | OUTPATIENT
Start: 2021-04-24 | End: 2021-05-01 | Stop reason: HOSPADM

## 2021-04-24 RX ORDER — INSULIN GLARGINE 100 [IU]/ML
30 INJECTION, SOLUTION SUBCUTANEOUS EVERY 12 HOURS
Status: DISCONTINUED | OUTPATIENT
Start: 2021-04-24 | End: 2021-04-26

## 2021-04-24 RX ORDER — DEXTROSE MONOHYDRATE 25 G/50ML
25 INJECTION, SOLUTION INTRAVENOUS
Status: DISCONTINUED | OUTPATIENT
Start: 2021-04-24 | End: 2021-05-01 | Stop reason: HOSPADM

## 2021-04-24 RX ORDER — ASPIRIN 81 MG/1
81 TABLET ORAL DAILY
Status: DISCONTINUED | OUTPATIENT
Start: 2021-04-24 | End: 2021-05-01 | Stop reason: HOSPADM

## 2021-04-24 RX ORDER — BISACODYL 10 MG
10 SUPPOSITORY, RECTAL RECTAL DAILY PRN
Status: DISCONTINUED | OUTPATIENT
Start: 2021-04-24 | End: 2021-05-01 | Stop reason: HOSPADM

## 2021-04-24 RX ORDER — AMOXICILLIN 250 MG
2 CAPSULE ORAL 2 TIMES DAILY
Status: DISCONTINUED | OUTPATIENT
Start: 2021-04-24 | End: 2021-05-01 | Stop reason: HOSPADM

## 2021-04-24 RX ORDER — NITROGLYCERIN 20 MG/100ML
5-200 INJECTION INTRAVENOUS
Status: DISCONTINUED | OUTPATIENT
Start: 2021-04-24 | End: 2021-04-27 | Stop reason: HOSPADM

## 2021-04-24 RX ORDER — LIDOCAINE HYDROCHLORIDE 20 MG/ML
5 SOLUTION OROPHARYNGEAL
Status: DISCONTINUED | OUTPATIENT
Start: 2021-04-24 | End: 2021-05-01 | Stop reason: HOSPADM

## 2021-04-24 RX ORDER — CALCIUM CARBONATE 200(500)MG
2 TABLET,CHEWABLE ORAL 2 TIMES DAILY PRN
Status: DISCONTINUED | OUTPATIENT
Start: 2021-04-24 | End: 2021-05-01 | Stop reason: HOSPADM

## 2021-04-24 RX ORDER — FERROUS SULFATE 325(65) MG
325 TABLET ORAL
Status: DISCONTINUED | OUTPATIENT
Start: 2021-04-24 | End: 2021-05-01 | Stop reason: HOSPADM

## 2021-04-24 RX ORDER — HEPARIN SODIUM 5000 [USP'U]/ML
30-32.3 INJECTION, SOLUTION INTRAVENOUS; SUBCUTANEOUS EVERY 6 HOURS PRN
Status: DISCONTINUED | OUTPATIENT
Start: 2021-04-24 | End: 2021-04-30

## 2021-04-24 RX ORDER — SODIUM BICARBONATE 650 MG/1
650 TABLET ORAL 2 TIMES DAILY
Status: DISCONTINUED | OUTPATIENT
Start: 2021-04-24 | End: 2021-04-25

## 2021-04-24 RX ORDER — HEPARIN SODIUM 10000 [USP'U]/100ML
12 INJECTION, SOLUTION INTRAVENOUS
Status: DISCONTINUED | OUTPATIENT
Start: 2021-04-24 | End: 2021-04-30

## 2021-04-24 RX ADMIN — OXYBUTYNIN CHLORIDE 5 MG: 5 TABLET, EXTENDED RELEASE ORAL at 12:24

## 2021-04-24 RX ADMIN — DOCUSATE SODIUM 50MG AND SENNOSIDES 8.6MG 2 TABLET: 8.6; 5 TABLET, FILM COATED ORAL at 12:31

## 2021-04-24 RX ADMIN — SODIUM CHLORIDE, PRESERVATIVE FREE 10 ML: 5 INJECTION INTRAVENOUS at 20:19

## 2021-04-24 RX ADMIN — PANTOPRAZOLE SODIUM 40 MG: 40 TABLET, DELAYED RELEASE ORAL at 20:19

## 2021-04-24 RX ADMIN — ATORVASTATIN CALCIUM 40 MG: 20 TABLET, FILM COATED ORAL at 12:30

## 2021-04-24 RX ADMIN — SUCRALFATE 1 G: 1 TABLET ORAL at 17:49

## 2021-04-24 RX ADMIN — INSULIN GLARGINE 30 UNITS: 100 INJECTION, SOLUTION SUBCUTANEOUS at 23:11

## 2021-04-24 RX ADMIN — METOPROLOL TARTRATE 25 MG: 25 TABLET, FILM COATED ORAL at 23:11

## 2021-04-24 RX ADMIN — ASPIRIN 81 MG: 81 TABLET, FILM COATED ORAL at 15:20

## 2021-04-24 RX ADMIN — GABAPENTIN 800 MG: 400 CAPSULE ORAL at 14:39

## 2021-04-24 RX ADMIN — LIDOCAINE HYDROCHLORIDE 5 ML: 20 SOLUTION ORAL; TOPICAL at 23:22

## 2021-04-24 RX ADMIN — DOCUSATE SODIUM 50MG AND SENNOSIDES 8.6MG 2 TABLET: 8.6; 5 TABLET, FILM COATED ORAL at 20:19

## 2021-04-24 RX ADMIN — SODIUM BICARBONATE 650 MG: 650 TABLET ORAL at 12:24

## 2021-04-24 RX ADMIN — PERFLUTREN 3 ML: 6.52 INJECTION, SUSPENSION INTRAVENOUS at 15:38

## 2021-04-24 RX ADMIN — GABAPENTIN 800 MG: 400 CAPSULE ORAL at 23:11

## 2021-04-24 RX ADMIN — ONDANSETRON 4 MG: 2 INJECTION INTRAMUSCULAR; INTRAVENOUS at 21:16

## 2021-04-24 RX ADMIN — HEPARIN SODIUM 4000 UNITS: 5000 INJECTION INTRAVENOUS; SUBCUTANEOUS at 11:23

## 2021-04-24 RX ADMIN — FERROUS SULFATE TAB 325 MG (65 MG ELEMENTAL FE) 325 MG: 325 (65 FE) TAB at 12:23

## 2021-04-24 RX ADMIN — DULOXETINE HYDROCHLORIDE 20 MG: 20 CAPSULE, DELAYED RELEASE ORAL at 12:23

## 2021-04-24 RX ADMIN — SODIUM CHLORIDE, PRESERVATIVE FREE 10 ML: 5 INJECTION INTRAVENOUS at 12:31

## 2021-04-24 RX ADMIN — INSULIN LISPRO 8 UNITS: 100 INJECTION, SOLUTION INTRAVENOUS; SUBCUTANEOUS at 12:41

## 2021-04-24 RX ADMIN — SODIUM BICARBONATE 650 MG: 650 TABLET ORAL at 20:19

## 2021-04-24 RX ADMIN — INSULIN GLARGINE 30 UNITS: 100 INJECTION, SOLUTION SUBCUTANEOUS at 12:30

## 2021-04-24 RX ADMIN — SUCRALFATE 1 G: 1 TABLET ORAL at 20:19

## 2021-04-24 RX ADMIN — HEPARIN SODIUM 3720 UNITS: 5000 INJECTION INTRAVENOUS; SUBCUTANEOUS at 19:12

## 2021-04-24 RX ADMIN — SUCRALFATE 1 G: 1 TABLET ORAL at 12:31

## 2021-04-24 RX ADMIN — AMLODIPINE BESYLATE 5 MG: 5 TABLET ORAL at 15:20

## 2021-04-25 LAB
ALBUMIN SERPL-MCNC: 3.2 G/DL (ref 3.5–5.2)
ANION GAP SERPL CALCULATED.3IONS-SCNC: 10.2 MMOL/L (ref 5–15)
APTT PPP: 104.4 SECONDS (ref 22.7–35.4)
APTT PPP: 53.1 SECONDS (ref 22.7–35.4)
APTT PPP: 76.3 SECONDS (ref 22.7–35.4)
BASOPHILS # BLD AUTO: 0.08 10*3/MM3 (ref 0–0.2)
BASOPHILS NFR BLD AUTO: 0.9 % (ref 0–1.5)
BUN SERPL-MCNC: 26 MG/DL (ref 8–23)
BUN/CREAT SERPL: 12.4 (ref 7–25)
CALCIUM SPEC-SCNC: 9.4 MG/DL (ref 8.6–10.5)
CHLORIDE SERPL-SCNC: 100 MMOL/L (ref 98–107)
CLOSE TME COLL+ADP + EPINEP PNL BLD: 61 % (ref 86–100)
CO2 SERPL-SCNC: 20.8 MMOL/L (ref 22–29)
CREAT SERPL-MCNC: 2.1 MG/DL (ref 0.57–1)
DEPRECATED RDW RBC AUTO: 45.7 FL (ref 37–54)
EOSINOPHIL # BLD AUTO: 0.15 10*3/MM3 (ref 0–0.4)
EOSINOPHIL NFR BLD AUTO: 1.7 % (ref 0.3–6.2)
ERYTHROCYTE [DISTWIDTH] IN BLOOD BY AUTOMATED COUNT: 14.6 % (ref 12.3–15.4)
GFR SERPL CREATININE-BSD FRML MDRD: 24 ML/MIN/1.73
GLUCOSE BLDC GLUCOMTR-MCNC: 219 MG/DL (ref 70–130)
GLUCOSE BLDC GLUCOMTR-MCNC: 241 MG/DL (ref 70–130)
GLUCOSE BLDC GLUCOMTR-MCNC: 265 MG/DL (ref 70–130)
GLUCOSE BLDC GLUCOMTR-MCNC: 311 MG/DL (ref 70–130)
GLUCOSE SERPL-MCNC: 247 MG/DL (ref 65–99)
HCT VFR BLD AUTO: 29.9 % (ref 34–46.6)
HGB BLD-MCNC: 9.5 G/DL (ref 12–15.9)
IMM GRANULOCYTES # BLD AUTO: 0.12 10*3/MM3 (ref 0–0.05)
IMM GRANULOCYTES NFR BLD AUTO: 1.3 % (ref 0–0.5)
LYMPHOCYTES # BLD AUTO: 2.29 10*3/MM3 (ref 0.7–3.1)
LYMPHOCYTES NFR BLD AUTO: 25.5 % (ref 19.6–45.3)
MCH RBC QN AUTO: 27.8 PG (ref 26.6–33)
MCHC RBC AUTO-ENTMCNC: 31.8 G/DL (ref 31.5–35.7)
MCV RBC AUTO: 87.4 FL (ref 79–97)
MONOCYTES # BLD AUTO: 0.52 10*3/MM3 (ref 0.1–0.9)
MONOCYTES NFR BLD AUTO: 5.8 % (ref 5–12)
NEUTROPHILS NFR BLD AUTO: 5.81 10*3/MM3 (ref 1.7–7)
NEUTROPHILS NFR BLD AUTO: 64.8 % (ref 42.7–76)
NRBC BLD AUTO-RTO: 0 /100 WBC (ref 0–0.2)
PHOSPHATE SERPL-MCNC: 4.3 MG/DL (ref 2.5–4.5)
PLATELET # BLD AUTO: 356 10*3/MM3 (ref 140–450)
PMV BLD AUTO: 8.8 FL (ref 6–12)
POTASSIUM SERPL-SCNC: 4.6 MMOL/L (ref 3.5–5.2)
QT INTERVAL: 402 MS
RBC # BLD AUTO: 3.42 10*6/MM3 (ref 3.77–5.28)
SODIUM SERPL-SCNC: 131 MMOL/L (ref 136–145)
TROPONIN T SERPL-MCNC: 0.4 NG/ML (ref 0–0.03)
WBC # BLD AUTO: 8.97 10*3/MM3 (ref 3.4–10.8)

## 2021-04-25 PROCEDURE — 63710000001 INSULIN LISPRO (HUMAN) PER 5 UNITS: Performed by: INTERNAL MEDICINE

## 2021-04-25 PROCEDURE — 94640 AIRWAY INHALATION TREATMENT: CPT

## 2021-04-25 PROCEDURE — 82962 GLUCOSE BLOOD TEST: CPT

## 2021-04-25 PROCEDURE — 25010000002 HEPARIN (PORCINE) PER 1000 UNITS: Performed by: THORACIC SURGERY (CARDIOTHORACIC VASCULAR SURGERY)

## 2021-04-25 PROCEDURE — 84484 ASSAY OF TROPONIN QUANT: CPT | Performed by: INTERNAL MEDICINE

## 2021-04-25 PROCEDURE — 99232 SBSQ HOSP IP/OBS MODERATE 35: CPT | Performed by: INTERNAL MEDICINE

## 2021-04-25 PROCEDURE — 85730 THROMBOPLASTIN TIME PARTIAL: CPT | Performed by: THORACIC SURGERY (CARDIOTHORACIC VASCULAR SURGERY)

## 2021-04-25 PROCEDURE — 85576 BLOOD PLATELET AGGREGATION: CPT | Performed by: NURSE PRACTITIONER

## 2021-04-25 PROCEDURE — 85025 COMPLETE CBC W/AUTO DIFF WBC: CPT | Performed by: THORACIC SURGERY (CARDIOTHORACIC VASCULAR SURGERY)

## 2021-04-25 PROCEDURE — 63710000001 INSULIN GLARGINE PER 5 UNITS: Performed by: INTERNAL MEDICINE

## 2021-04-25 PROCEDURE — 25010000002 MORPHINE PER 10 MG: Performed by: INTERNAL MEDICINE

## 2021-04-25 PROCEDURE — 99231 SBSQ HOSP IP/OBS SF/LOW 25: CPT | Performed by: NURSE PRACTITIONER

## 2021-04-25 PROCEDURE — 25010000002 FUROSEMIDE PER 20 MG: Performed by: INTERNAL MEDICINE

## 2021-04-25 PROCEDURE — 25010000002 HYDRALAZINE PER 20 MG: Performed by: INTERNAL MEDICINE

## 2021-04-25 PROCEDURE — 80069 RENAL FUNCTION PANEL: CPT | Performed by: NURSE PRACTITIONER

## 2021-04-25 PROCEDURE — 94799 UNLISTED PULMONARY SVC/PX: CPT

## 2021-04-25 RX ORDER — GABAPENTIN 400 MG/1
400 CAPSULE ORAL EVERY 8 HOURS SCHEDULED
Status: DISCONTINUED | OUTPATIENT
Start: 2021-04-25 | End: 2021-05-01 | Stop reason: HOSPADM

## 2021-04-25 RX ORDER — HYDRALAZINE HYDROCHLORIDE 20 MG/ML
10 INJECTION INTRAMUSCULAR; INTRAVENOUS ONCE
Status: COMPLETED | OUTPATIENT
Start: 2021-04-25 | End: 2021-04-25

## 2021-04-25 RX ORDER — FUROSEMIDE 10 MG/ML
80 INJECTION INTRAMUSCULAR; INTRAVENOUS ONCE
Status: COMPLETED | OUTPATIENT
Start: 2021-04-25 | End: 2021-04-25

## 2021-04-25 RX ORDER — MORPHINE SULFATE 2 MG/ML
1 INJECTION, SOLUTION INTRAMUSCULAR; INTRAVENOUS ONCE
Status: COMPLETED | OUTPATIENT
Start: 2021-04-25 | End: 2021-04-25

## 2021-04-25 RX ORDER — AMLODIPINE BESYLATE 10 MG/1
10 TABLET ORAL
Status: DISCONTINUED | OUTPATIENT
Start: 2021-04-26 | End: 2021-04-27

## 2021-04-25 RX ORDER — ISOSORBIDE MONONITRATE 60 MG/1
60 TABLET, EXTENDED RELEASE ORAL
Status: DISCONTINUED | OUTPATIENT
Start: 2021-04-25 | End: 2021-05-01 | Stop reason: HOSPADM

## 2021-04-25 RX ORDER — AMLODIPINE BESYLATE 5 MG/1
5 TABLET ORAL ONCE
Status: COMPLETED | OUTPATIENT
Start: 2021-04-25 | End: 2021-04-25

## 2021-04-25 RX ADMIN — ASPIRIN 81 MG: 81 TABLET, FILM COATED ORAL at 08:02

## 2021-04-25 RX ADMIN — SUCRALFATE 1 G: 1 TABLET ORAL at 08:01

## 2021-04-25 RX ADMIN — HEPARIN SODIUM 14 UNITS/KG/HR: 10000 INJECTION, SOLUTION INTRAVENOUS at 20:21

## 2021-04-25 RX ADMIN — INSULIN LISPRO 12 UNITS: 100 INJECTION, SOLUTION INTRAVENOUS; SUBCUTANEOUS at 17:48

## 2021-04-25 RX ADMIN — AMLODIPINE BESYLATE 5 MG: 5 TABLET ORAL at 14:36

## 2021-04-25 RX ADMIN — ANTACID TABLETS 2 TABLET: 500 TABLET, CHEWABLE ORAL at 01:50

## 2021-04-25 RX ADMIN — HYDRALAZINE HYDROCHLORIDE 10 MG: 20 INJECTION, SOLUTION INTRAMUSCULAR; INTRAVENOUS at 01:06

## 2021-04-25 RX ADMIN — INSULIN GLARGINE 30 UNITS: 100 INJECTION, SOLUTION SUBCUTANEOUS at 21:48

## 2021-04-25 RX ADMIN — IPRATROPIUM BROMIDE AND ALBUTEROL SULFATE 3 ML: 2.5; .5 SOLUTION RESPIRATORY (INHALATION) at 08:31

## 2021-04-25 RX ADMIN — SUCRALFATE 1 G: 1 TABLET ORAL at 11:44

## 2021-04-25 RX ADMIN — INSULIN GLARGINE 30 UNITS: 100 INJECTION, SOLUTION SUBCUTANEOUS at 11:14

## 2021-04-25 RX ADMIN — OXYBUTYNIN CHLORIDE 5 MG: 5 TABLET, EXTENDED RELEASE ORAL at 08:02

## 2021-04-25 RX ADMIN — FERROUS SULFATE TAB 325 MG (65 MG ELEMENTAL FE) 325 MG: 325 (65 FE) TAB at 08:01

## 2021-04-25 RX ADMIN — FUROSEMIDE 80 MG: 10 INJECTION, SOLUTION INTRAMUSCULAR; INTRAVENOUS at 13:21

## 2021-04-25 RX ADMIN — AMLODIPINE BESYLATE 5 MG: 5 TABLET ORAL at 08:01

## 2021-04-25 RX ADMIN — SUCRALFATE 1 G: 1 TABLET ORAL at 17:12

## 2021-04-25 RX ADMIN — GABAPENTIN 800 MG: 400 CAPSULE ORAL at 06:07

## 2021-04-25 RX ADMIN — INSULIN LISPRO 8 UNITS: 100 INJECTION, SOLUTION INTRAVENOUS; SUBCUTANEOUS at 12:07

## 2021-04-25 RX ADMIN — HYDRALAZINE HYDROCHLORIDE 10 MG: 20 INJECTION, SOLUTION INTRAMUSCULAR; INTRAVENOUS at 02:37

## 2021-04-25 RX ADMIN — SODIUM CHLORIDE, PRESERVATIVE FREE 10 ML: 5 INJECTION INTRAVENOUS at 20:14

## 2021-04-25 RX ADMIN — ATORVASTATIN CALCIUM 40 MG: 20 TABLET, FILM COATED ORAL at 08:02

## 2021-04-25 RX ADMIN — METOPROLOL TARTRATE 25 MG: 25 TABLET, FILM COATED ORAL at 20:14

## 2021-04-25 RX ADMIN — NITROGLYCERIN 10 MCG/MIN: 20 INJECTION INTRAVENOUS at 11:14

## 2021-04-25 RX ADMIN — DOCUSATE SODIUM 50MG AND SENNOSIDES 8.6MG 2 TABLET: 8.6; 5 TABLET, FILM COATED ORAL at 08:02

## 2021-04-25 RX ADMIN — GABAPENTIN 400 MG: 400 CAPSULE ORAL at 21:48

## 2021-04-25 RX ADMIN — MORPHINE SULFATE 1 MG: 2 INJECTION, SOLUTION INTRAMUSCULAR; INTRAVENOUS at 02:41

## 2021-04-25 RX ADMIN — DULOXETINE HYDROCHLORIDE 20 MG: 20 CAPSULE, DELAYED RELEASE ORAL at 08:02

## 2021-04-25 RX ADMIN — ISOSORBIDE MONONITRATE 60 MG: 60 TABLET ORAL at 13:21

## 2021-04-25 RX ADMIN — SUCRALFATE 1 G: 1 TABLET ORAL at 20:13

## 2021-04-25 RX ADMIN — NICOTINE 1 PATCH: 14 PATCH, EXTENDED RELEASE TRANSDERMAL at 01:44

## 2021-04-25 RX ADMIN — SODIUM CHLORIDE, PRESERVATIVE FREE 10 ML: 5 INJECTION INTRAVENOUS at 11:57

## 2021-04-25 RX ADMIN — SODIUM BICARBONATE 650 MG: 650 TABLET ORAL at 08:02

## 2021-04-25 RX ADMIN — PANTOPRAZOLE SODIUM 40 MG: 40 TABLET, DELAYED RELEASE ORAL at 20:13

## 2021-04-25 RX ADMIN — HEPARIN SODIUM 14 UNITS/KG/HR: 10000 INJECTION, SOLUTION INTRAVENOUS at 06:27

## 2021-04-25 RX ADMIN — METOPROLOL TARTRATE 25 MG: 25 TABLET, FILM COATED ORAL at 08:02

## 2021-04-25 RX ADMIN — HEPARIN SODIUM 4000 UNITS: 5000 INJECTION INTRAVENOUS; SUBCUTANEOUS at 07:26

## 2021-04-25 RX ADMIN — INSULIN LISPRO 8 UNITS: 100 INJECTION, SOLUTION INTRAVENOUS; SUBCUTANEOUS at 08:03

## 2021-04-25 RX ADMIN — GABAPENTIN 400 MG: 400 CAPSULE ORAL at 13:21

## 2021-04-26 LAB
ALBUMIN SERPL-MCNC: 3.1 G/DL (ref 3.5–5.2)
ANION GAP SERPL CALCULATED.3IONS-SCNC: 10.4 MMOL/L (ref 5–15)
APTT PPP: 59.2 SECONDS (ref 22.7–35.4)
APTT PPP: 64.9 SECONDS (ref 22.7–35.4)
BASOPHILS # BLD AUTO: 0.09 10*3/MM3 (ref 0–0.2)
BASOPHILS NFR BLD AUTO: 1 % (ref 0–1.5)
BUN SERPL-MCNC: 35 MG/DL (ref 8–23)
BUN/CREAT SERPL: 14.6 (ref 7–25)
CALCIUM SPEC-SCNC: 9.1 MG/DL (ref 8.6–10.5)
CHLORIDE SERPL-SCNC: 100 MMOL/L (ref 98–107)
CO2 SERPL-SCNC: 21.6 MMOL/L (ref 22–29)
CREAT SERPL-MCNC: 2.39 MG/DL (ref 0.57–1)
DEPRECATED RDW RBC AUTO: 47.1 FL (ref 37–54)
EOSINOPHIL # BLD AUTO: 0.44 10*3/MM3 (ref 0–0.4)
EOSINOPHIL NFR BLD AUTO: 4.7 % (ref 0.3–6.2)
ERYTHROCYTE [DISTWIDTH] IN BLOOD BY AUTOMATED COUNT: 14.7 % (ref 12.3–15.4)
GFR SERPL CREATININE-BSD FRML MDRD: 21 ML/MIN/1.73
GLUCOSE BLDC GLUCOMTR-MCNC: 218 MG/DL (ref 70–130)
GLUCOSE BLDC GLUCOMTR-MCNC: 270 MG/DL (ref 70–130)
GLUCOSE BLDC GLUCOMTR-MCNC: 322 MG/DL (ref 70–130)
GLUCOSE BLDC GLUCOMTR-MCNC: 358 MG/DL (ref 70–130)
GLUCOSE SERPL-MCNC: 228 MG/DL (ref 65–99)
HCT VFR BLD AUTO: 29.2 % (ref 34–46.6)
HGB BLD-MCNC: 9.3 G/DL (ref 12–15.9)
IMM GRANULOCYTES # BLD AUTO: 0.09 10*3/MM3 (ref 0–0.05)
IMM GRANULOCYTES NFR BLD AUTO: 1 % (ref 0–0.5)
LYMPHOCYTES # BLD AUTO: 2.28 10*3/MM3 (ref 0.7–3.1)
LYMPHOCYTES NFR BLD AUTO: 24.3 % (ref 19.6–45.3)
MCH RBC QN AUTO: 28.2 PG (ref 26.6–33)
MCHC RBC AUTO-ENTMCNC: 31.8 G/DL (ref 31.5–35.7)
MCV RBC AUTO: 88.5 FL (ref 79–97)
MONOCYTES # BLD AUTO: 0.54 10*3/MM3 (ref 0.1–0.9)
MONOCYTES NFR BLD AUTO: 5.7 % (ref 5–12)
NEUTROPHILS NFR BLD AUTO: 5.96 10*3/MM3 (ref 1.7–7)
NEUTROPHILS NFR BLD AUTO: 63.3 % (ref 42.7–76)
NRBC BLD AUTO-RTO: 0.1 /100 WBC (ref 0–0.2)
PHOSPHATE SERPL-MCNC: 4.3 MG/DL (ref 2.5–4.5)
PLATELET # BLD AUTO: 324 10*3/MM3 (ref 140–450)
PMV BLD AUTO: 8.8 FL (ref 6–12)
POTASSIUM SERPL-SCNC: 4.9 MMOL/L (ref 3.5–5.2)
RBC # BLD AUTO: 3.3 10*6/MM3 (ref 3.77–5.28)
SODIUM SERPL-SCNC: 132 MMOL/L (ref 136–145)
TROPONIN T SERPL-MCNC: 0.65 NG/ML (ref 0–0.03)
TROPONIN T SERPL-MCNC: 0.7 NG/ML (ref 0–0.03)
WBC # BLD AUTO: 9.4 10*3/MM3 (ref 3.4–10.8)

## 2021-04-26 PROCEDURE — 25010000002 HEPARIN (PORCINE) PER 1000 UNITS: Performed by: THORACIC SURGERY (CARDIOTHORACIC VASCULAR SURGERY)

## 2021-04-26 PROCEDURE — 85730 THROMBOPLASTIN TIME PARTIAL: CPT | Performed by: THORACIC SURGERY (CARDIOTHORACIC VASCULAR SURGERY)

## 2021-04-26 PROCEDURE — 80069 RENAL FUNCTION PANEL: CPT | Performed by: INTERNAL MEDICINE

## 2021-04-26 PROCEDURE — 94799 UNLISTED PULMONARY SVC/PX: CPT

## 2021-04-26 PROCEDURE — 63710000001 INSULIN GLARGINE PER 5 UNITS: Performed by: INTERNAL MEDICINE

## 2021-04-26 PROCEDURE — 84484 ASSAY OF TROPONIN QUANT: CPT | Performed by: INTERNAL MEDICINE

## 2021-04-26 PROCEDURE — 94760 N-INVAS EAR/PLS OXIMETRY 1: CPT

## 2021-04-26 PROCEDURE — 94660 CPAP INITIATION&MGMT: CPT

## 2021-04-26 PROCEDURE — 63710000001 INSULIN LISPRO (HUMAN) PER 5 UNITS: Performed by: NURSE PRACTITIONER

## 2021-04-26 PROCEDURE — 84484 ASSAY OF TROPONIN QUANT: CPT | Performed by: NURSE PRACTITIONER

## 2021-04-26 PROCEDURE — 85025 COMPLETE CBC W/AUTO DIFF WBC: CPT | Performed by: THORACIC SURGERY (CARDIOTHORACIC VASCULAR SURGERY)

## 2021-04-26 PROCEDURE — 99232 SBSQ HOSP IP/OBS MODERATE 35: CPT | Performed by: INTERNAL MEDICINE

## 2021-04-26 PROCEDURE — 63710000001 INSULIN LISPRO (HUMAN) PER 5 UNITS: Performed by: INTERNAL MEDICINE

## 2021-04-26 PROCEDURE — 82962 GLUCOSE BLOOD TEST: CPT

## 2021-04-26 PROCEDURE — 63710000001 INSULIN GLARGINE PER 5 UNITS: Performed by: NURSE PRACTITIONER

## 2021-04-26 RX ORDER — INSULIN GLARGINE 100 [IU]/ML
40 INJECTION, SOLUTION SUBCUTANEOUS EVERY 12 HOURS
Status: DISCONTINUED | OUTPATIENT
Start: 2021-04-26 | End: 2021-05-01 | Stop reason: HOSPADM

## 2021-04-26 RX ADMIN — IPRATROPIUM BROMIDE AND ALBUTEROL SULFATE 3 ML: 2.5; .5 SOLUTION RESPIRATORY (INHALATION) at 20:37

## 2021-04-26 RX ADMIN — METOPROLOL TARTRATE 25 MG: 25 TABLET, FILM COATED ORAL at 21:28

## 2021-04-26 RX ADMIN — ATORVASTATIN CALCIUM 40 MG: 20 TABLET, FILM COATED ORAL at 08:43

## 2021-04-26 RX ADMIN — SUCRALFATE 1 G: 1 TABLET ORAL at 11:43

## 2021-04-26 RX ADMIN — IPRATROPIUM BROMIDE AND ALBUTEROL SULFATE 3 ML: 2.5; .5 SOLUTION RESPIRATORY (INHALATION) at 10:16

## 2021-04-26 RX ADMIN — GABAPENTIN 400 MG: 400 CAPSULE ORAL at 14:34

## 2021-04-26 RX ADMIN — METOPROLOL TARTRATE 25 MG: 25 TABLET, FILM COATED ORAL at 08:38

## 2021-04-26 RX ADMIN — HEPARIN SODIUM 15 UNITS/KG/HR: 10000 INJECTION, SOLUTION INTRAVENOUS at 12:39

## 2021-04-26 RX ADMIN — INSULIN LISPRO 16 UNITS: 100 INJECTION, SOLUTION INTRAVENOUS; SUBCUTANEOUS at 18:35

## 2021-04-26 RX ADMIN — INSULIN GLARGINE 30 UNITS: 100 INJECTION, SOLUTION SUBCUTANEOUS at 10:30

## 2021-04-26 RX ADMIN — INSULIN GLARGINE 40 UNITS: 100 INJECTION, SOLUTION SUBCUTANEOUS at 21:56

## 2021-04-26 RX ADMIN — SUCRALFATE 1 G: 1 TABLET ORAL at 17:37

## 2021-04-26 RX ADMIN — GABAPENTIN 400 MG: 400 CAPSULE ORAL at 07:47

## 2021-04-26 RX ADMIN — PANTOPRAZOLE SODIUM 40 MG: 40 TABLET, DELAYED RELEASE ORAL at 21:28

## 2021-04-26 RX ADMIN — SUCRALFATE 1 G: 1 TABLET ORAL at 07:47

## 2021-04-26 RX ADMIN — AMLODIPINE BESYLATE 10 MG: 10 TABLET ORAL at 08:39

## 2021-04-26 RX ADMIN — DOCUSATE SODIUM 50MG AND SENNOSIDES 8.6MG 2 TABLET: 8.6; 5 TABLET, FILM COATED ORAL at 21:27

## 2021-04-26 RX ADMIN — ISOSORBIDE MONONITRATE 60 MG: 60 TABLET ORAL at 08:39

## 2021-04-26 RX ADMIN — FERROUS SULFATE TAB 325 MG (65 MG ELEMENTAL FE) 325 MG: 325 (65 FE) TAB at 08:11

## 2021-04-26 RX ADMIN — DULOXETINE HYDROCHLORIDE 20 MG: 20 CAPSULE, DELAYED RELEASE ORAL at 08:38

## 2021-04-26 RX ADMIN — GABAPENTIN 400 MG: 400 CAPSULE ORAL at 21:28

## 2021-04-26 RX ADMIN — INSULIN LISPRO 8 UNITS: 100 INJECTION, SOLUTION INTRAVENOUS; SUBCUTANEOUS at 08:11

## 2021-04-26 RX ADMIN — INSULIN LISPRO 16 UNITS: 100 INJECTION, SOLUTION INTRAVENOUS; SUBCUTANEOUS at 12:21

## 2021-04-26 RX ADMIN — OXYBUTYNIN CHLORIDE 5 MG: 5 TABLET, EXTENDED RELEASE ORAL at 08:39

## 2021-04-26 RX ADMIN — IPRATROPIUM BROMIDE AND ALBUTEROL SULFATE 3 ML: 2.5; .5 SOLUTION RESPIRATORY (INHALATION) at 07:04

## 2021-04-26 RX ADMIN — NICOTINE 1 PATCH: 14 PATCH, EXTENDED RELEASE TRANSDERMAL at 08:39

## 2021-04-26 RX ADMIN — SODIUM CHLORIDE, PRESERVATIVE FREE 10 ML: 5 INJECTION INTRAVENOUS at 08:41

## 2021-04-26 RX ADMIN — SUCRALFATE 1 G: 1 TABLET ORAL at 21:27

## 2021-04-26 RX ADMIN — SODIUM CHLORIDE, PRESERVATIVE FREE 10 ML: 5 INJECTION INTRAVENOUS at 21:30

## 2021-04-26 RX ADMIN — ASPIRIN 81 MG: 81 TABLET, FILM COATED ORAL at 08:39

## 2021-04-27 LAB
ALBUMIN SERPL-MCNC: 3 G/DL (ref 3.5–5.2)
ANION GAP SERPL CALCULATED.3IONS-SCNC: 14.4 MMOL/L (ref 5–15)
APTT PPP: 66 SECONDS (ref 22.7–35.4)
APTT PPP: 66.7 SECONDS (ref 22.7–35.4)
BASOPHILS # BLD AUTO: 0.07 10*3/MM3 (ref 0–0.2)
BASOPHILS NFR BLD AUTO: 0.9 % (ref 0–1.5)
BUN SERPL-MCNC: 44 MG/DL (ref 8–23)
BUN/CREAT SERPL: 16.9 (ref 7–25)
CALCIUM SPEC-SCNC: 9.1 MG/DL (ref 8.6–10.5)
CHLORIDE SERPL-SCNC: 99 MMOL/L (ref 98–107)
CO2 SERPL-SCNC: 19.6 MMOL/L (ref 22–29)
CREAT SERPL-MCNC: 2.61 MG/DL (ref 0.57–1)
DEPRECATED RDW RBC AUTO: 45.7 FL (ref 37–54)
EOSINOPHIL # BLD AUTO: 0.34 10*3/MM3 (ref 0–0.4)
EOSINOPHIL NFR BLD AUTO: 4.1 % (ref 0.3–6.2)
ERYTHROCYTE [DISTWIDTH] IN BLOOD BY AUTOMATED COUNT: 14.3 % (ref 12.3–15.4)
GFR SERPL CREATININE-BSD FRML MDRD: 19 ML/MIN/1.73
GLUCOSE BLDC GLUCOMTR-MCNC: 232 MG/DL (ref 70–130)
GLUCOSE BLDC GLUCOMTR-MCNC: 256 MG/DL (ref 70–130)
GLUCOSE BLDC GLUCOMTR-MCNC: 347 MG/DL (ref 70–130)
GLUCOSE BLDC GLUCOMTR-MCNC: 354 MG/DL (ref 70–130)
GLUCOSE SERPL-MCNC: 225 MG/DL (ref 65–99)
HCT VFR BLD AUTO: 29 % (ref 34–46.6)
HGB BLD-MCNC: 9.1 G/DL (ref 12–15.9)
IMM GRANULOCYTES # BLD AUTO: 0.12 10*3/MM3 (ref 0–0.05)
IMM GRANULOCYTES NFR BLD AUTO: 1.5 % (ref 0–0.5)
LYMPHOCYTES # BLD AUTO: 2.66 10*3/MM3 (ref 0.7–3.1)
LYMPHOCYTES NFR BLD AUTO: 32.4 % (ref 19.6–45.3)
MCH RBC QN AUTO: 27.8 PG (ref 26.6–33)
MCHC RBC AUTO-ENTMCNC: 31.4 G/DL (ref 31.5–35.7)
MCV RBC AUTO: 88.7 FL (ref 79–97)
MONOCYTES # BLD AUTO: 0.56 10*3/MM3 (ref 0.1–0.9)
MONOCYTES NFR BLD AUTO: 6.8 % (ref 5–12)
NEUTROPHILS NFR BLD AUTO: 4.47 10*3/MM3 (ref 1.7–7)
NEUTROPHILS NFR BLD AUTO: 54.3 % (ref 42.7–76)
NRBC BLD AUTO-RTO: 0 /100 WBC (ref 0–0.2)
PHOSPHATE SERPL-MCNC: 4.3 MG/DL (ref 2.5–4.5)
PLATELET # BLD AUTO: 323 10*3/MM3 (ref 140–450)
PMV BLD AUTO: 8.9 FL (ref 6–12)
POTASSIUM SERPL-SCNC: 4.6 MMOL/L (ref 3.5–5.2)
RBC # BLD AUTO: 3.27 10*6/MM3 (ref 3.77–5.28)
SODIUM SERPL-SCNC: 133 MMOL/L (ref 136–145)
TROPONIN T SERPL-MCNC: 0.71 NG/ML (ref 0–0.03)
WBC # BLD AUTO: 8.22 10*3/MM3 (ref 3.4–10.8)

## 2021-04-27 PROCEDURE — 84484 ASSAY OF TROPONIN QUANT: CPT | Performed by: NURSE PRACTITIONER

## 2021-04-27 PROCEDURE — 94799 UNLISTED PULMONARY SVC/PX: CPT

## 2021-04-27 PROCEDURE — 99232 SBSQ HOSP IP/OBS MODERATE 35: CPT | Performed by: INTERNAL MEDICINE

## 2021-04-27 PROCEDURE — 25010000002 HEPARIN (PORCINE) 25000-0.45 UT/250ML-% SOLUTION: Performed by: NURSE PRACTITIONER

## 2021-04-27 PROCEDURE — 63710000001 INSULIN LISPRO (HUMAN) PER 5 UNITS: Performed by: NURSE PRACTITIONER

## 2021-04-27 PROCEDURE — 85730 THROMBOPLASTIN TIME PARTIAL: CPT | Performed by: THORACIC SURGERY (CARDIOTHORACIC VASCULAR SURGERY)

## 2021-04-27 PROCEDURE — 80069 RENAL FUNCTION PANEL: CPT | Performed by: INTERNAL MEDICINE

## 2021-04-27 PROCEDURE — 25010000002 HEPARIN (PORCINE) PER 1000 UNITS: Performed by: NURSE PRACTITIONER

## 2021-04-27 PROCEDURE — 85025 COMPLETE CBC W/AUTO DIFF WBC: CPT | Performed by: NURSE PRACTITIONER

## 2021-04-27 PROCEDURE — 85730 THROMBOPLASTIN TIME PARTIAL: CPT | Performed by: NURSE PRACTITIONER

## 2021-04-27 PROCEDURE — 63710000001 INSULIN GLARGINE PER 5 UNITS: Performed by: NURSE PRACTITIONER

## 2021-04-27 PROCEDURE — 82962 GLUCOSE BLOOD TEST: CPT

## 2021-04-27 RX ORDER — AMLODIPINE BESYLATE 5 MG/1
5 TABLET ORAL
Status: DISCONTINUED | OUTPATIENT
Start: 2021-04-28 | End: 2021-04-29

## 2021-04-27 RX ADMIN — INSULIN GLARGINE 40 UNITS: 100 INJECTION, SOLUTION SUBCUTANEOUS at 22:32

## 2021-04-27 RX ADMIN — ASPIRIN 81 MG: 81 TABLET, FILM COATED ORAL at 07:39

## 2021-04-27 RX ADMIN — INSULIN LISPRO 8 UNITS: 100 INJECTION, SOLUTION INTRAVENOUS; SUBCUTANEOUS at 06:56

## 2021-04-27 RX ADMIN — PANTOPRAZOLE SODIUM 40 MG: 40 TABLET, DELAYED RELEASE ORAL at 20:36

## 2021-04-27 RX ADMIN — INSULIN LISPRO 12 UNITS: 100 INJECTION, SOLUTION INTRAVENOUS; SUBCUTANEOUS at 16:56

## 2021-04-27 RX ADMIN — SUCRALFATE 1 G: 1 TABLET ORAL at 07:39

## 2021-04-27 RX ADMIN — GABAPENTIN 400 MG: 400 CAPSULE ORAL at 15:13

## 2021-04-27 RX ADMIN — METOPROLOL TARTRATE 25 MG: 25 TABLET, FILM COATED ORAL at 07:41

## 2021-04-27 RX ADMIN — FUROSEMIDE 60 MG: 20 TABLET ORAL at 07:40

## 2021-04-27 RX ADMIN — SODIUM CHLORIDE, PRESERVATIVE FREE 10 ML: 5 INJECTION INTRAVENOUS at 08:31

## 2021-04-27 RX ADMIN — HEPARIN SODIUM 15 UNITS/KG/HR: 10000 INJECTION, SOLUTION INTRAVENOUS at 16:53

## 2021-04-27 RX ADMIN — FERROUS SULFATE TAB 325 MG (65 MG ELEMENTAL FE) 325 MG: 325 (65 FE) TAB at 07:39

## 2021-04-27 RX ADMIN — ATORVASTATIN CALCIUM 40 MG: 20 TABLET, FILM COATED ORAL at 07:40

## 2021-04-27 RX ADMIN — INSULIN LISPRO 20 UNITS: 100 INJECTION, SOLUTION INTRAVENOUS; SUBCUTANEOUS at 10:57

## 2021-04-27 RX ADMIN — INSULIN GLARGINE 40 UNITS: 100 INJECTION, SOLUTION SUBCUTANEOUS at 10:46

## 2021-04-27 RX ADMIN — IPRATROPIUM BROMIDE AND ALBUTEROL SULFATE 3 ML: 2.5; .5 SOLUTION RESPIRATORY (INHALATION) at 08:00

## 2021-04-27 RX ADMIN — ISOSORBIDE MONONITRATE 60 MG: 60 TABLET ORAL at 07:40

## 2021-04-27 RX ADMIN — GABAPENTIN 400 MG: 400 CAPSULE ORAL at 22:32

## 2021-04-27 RX ADMIN — SODIUM CHLORIDE, PRESERVATIVE FREE 10 ML: 5 INJECTION INTRAVENOUS at 20:36

## 2021-04-27 RX ADMIN — DOCUSATE SODIUM 50MG AND SENNOSIDES 8.6MG 2 TABLET: 8.6; 5 TABLET, FILM COATED ORAL at 07:40

## 2021-04-27 RX ADMIN — IPRATROPIUM BROMIDE AND ALBUTEROL SULFATE 3 ML: 2.5; .5 SOLUTION RESPIRATORY (INHALATION) at 14:49

## 2021-04-27 RX ADMIN — OXYBUTYNIN CHLORIDE 5 MG: 5 TABLET, EXTENDED RELEASE ORAL at 07:39

## 2021-04-27 RX ADMIN — SUCRALFATE 1 G: 1 TABLET ORAL at 15:13

## 2021-04-27 RX ADMIN — IPRATROPIUM BROMIDE AND ALBUTEROL SULFATE 3 ML: 2.5; .5 SOLUTION RESPIRATORY (INHALATION) at 20:46

## 2021-04-27 RX ADMIN — HEPARIN SODIUM 15 UNITS/KG/HR: 10000 INJECTION, SOLUTION INTRAVENOUS at 04:17

## 2021-04-27 RX ADMIN — GABAPENTIN 400 MG: 400 CAPSULE ORAL at 05:20

## 2021-04-27 RX ADMIN — METOPROLOL TARTRATE 25 MG: 25 TABLET, FILM COATED ORAL at 20:36

## 2021-04-27 RX ADMIN — AMLODIPINE BESYLATE 10 MG: 10 TABLET ORAL at 07:39

## 2021-04-27 RX ADMIN — DULOXETINE HYDROCHLORIDE 20 MG: 20 CAPSULE, DELAYED RELEASE ORAL at 07:39

## 2021-04-28 LAB
ALBUMIN SERPL-MCNC: 3.1 G/DL (ref 3.5–5.2)
ANION GAP SERPL CALCULATED.3IONS-SCNC: 12.4 MMOL/L (ref 5–15)
APTT PPP: 76.3 SECONDS (ref 22.7–35.4)
BASOPHILS # BLD AUTO: 0.07 10*3/MM3 (ref 0–0.2)
BASOPHILS NFR BLD AUTO: 0.9 % (ref 0–1.5)
BUN SERPL-MCNC: 45 MG/DL (ref 8–23)
BUN/CREAT SERPL: 19.8 (ref 7–25)
CALCIUM SPEC-SCNC: 8.9 MG/DL (ref 8.6–10.5)
CHLORIDE SERPL-SCNC: 100 MMOL/L (ref 98–107)
CO2 SERPL-SCNC: 20.6 MMOL/L (ref 22–29)
CREAT SERPL-MCNC: 2.27 MG/DL (ref 0.57–1)
DEPRECATED RDW RBC AUTO: 45.5 FL (ref 37–54)
EOSINOPHIL # BLD AUTO: 0.28 10*3/MM3 (ref 0–0.4)
EOSINOPHIL NFR BLD AUTO: 3.4 % (ref 0.3–6.2)
ERYTHROCYTE [DISTWIDTH] IN BLOOD BY AUTOMATED COUNT: 14.2 % (ref 12.3–15.4)
GFR SERPL CREATININE-BSD FRML MDRD: 22 ML/MIN/1.73
GLUCOSE BLDC GLUCOMTR-MCNC: 265 MG/DL (ref 70–130)
GLUCOSE BLDC GLUCOMTR-MCNC: 279 MG/DL (ref 70–130)
GLUCOSE BLDC GLUCOMTR-MCNC: 306 MG/DL (ref 70–130)
GLUCOSE BLDC GLUCOMTR-MCNC: 385 MG/DL (ref 70–130)
GLUCOSE SERPL-MCNC: 290 MG/DL (ref 65–99)
HCT VFR BLD AUTO: 29.5 % (ref 34–46.6)
HGB BLD-MCNC: 9.3 G/DL (ref 12–15.9)
IMM GRANULOCYTES # BLD AUTO: 0.13 10*3/MM3 (ref 0–0.05)
IMM GRANULOCYTES NFR BLD AUTO: 1.6 % (ref 0–0.5)
LYMPHOCYTES # BLD AUTO: 2.62 10*3/MM3 (ref 0.7–3.1)
LYMPHOCYTES NFR BLD AUTO: 32.1 % (ref 19.6–45.3)
MCH RBC QN AUTO: 28 PG (ref 26.6–33)
MCHC RBC AUTO-ENTMCNC: 31.5 G/DL (ref 31.5–35.7)
MCV RBC AUTO: 88.9 FL (ref 79–97)
MONOCYTES # BLD AUTO: 0.58 10*3/MM3 (ref 0.1–0.9)
MONOCYTES NFR BLD AUTO: 7.1 % (ref 5–12)
NEUTROPHILS NFR BLD AUTO: 4.47 10*3/MM3 (ref 1.7–7)
NEUTROPHILS NFR BLD AUTO: 54.9 % (ref 42.7–76)
NRBC BLD AUTO-RTO: 0 /100 WBC (ref 0–0.2)
PHOSPHATE SERPL-MCNC: 4.9 MG/DL (ref 2.5–4.5)
PLATELET # BLD AUTO: 305 10*3/MM3 (ref 140–450)
PMV BLD AUTO: 9.1 FL (ref 6–12)
POTASSIUM SERPL-SCNC: 4.8 MMOL/L (ref 3.5–5.2)
RBC # BLD AUTO: 3.32 10*6/MM3 (ref 3.77–5.28)
SODIUM SERPL-SCNC: 133 MMOL/L (ref 136–145)
WBC # BLD AUTO: 8.15 10*3/MM3 (ref 3.4–10.8)

## 2021-04-28 PROCEDURE — 94799 UNLISTED PULMONARY SVC/PX: CPT

## 2021-04-28 PROCEDURE — 99232 SBSQ HOSP IP/OBS MODERATE 35: CPT | Performed by: INTERNAL MEDICINE

## 2021-04-28 PROCEDURE — 94660 CPAP INITIATION&MGMT: CPT

## 2021-04-28 PROCEDURE — 25010000002 HEPARIN (PORCINE) PER 1000 UNITS: Performed by: NURSE PRACTITIONER

## 2021-04-28 PROCEDURE — 63710000001 INSULIN GLARGINE PER 5 UNITS: Performed by: NURSE PRACTITIONER

## 2021-04-28 PROCEDURE — 82962 GLUCOSE BLOOD TEST: CPT

## 2021-04-28 PROCEDURE — 85730 THROMBOPLASTIN TIME PARTIAL: CPT | Performed by: NURSE PRACTITIONER

## 2021-04-28 PROCEDURE — 85025 COMPLETE CBC W/AUTO DIFF WBC: CPT | Performed by: NURSE PRACTITIONER

## 2021-04-28 PROCEDURE — 99232 SBSQ HOSP IP/OBS MODERATE 35: CPT | Performed by: NURSE PRACTITIONER

## 2021-04-28 PROCEDURE — 80069 RENAL FUNCTION PANEL: CPT | Performed by: INTERNAL MEDICINE

## 2021-04-28 PROCEDURE — 63710000001 INSULIN LISPRO (HUMAN) PER 5 UNITS: Performed by: NURSE PRACTITIONER

## 2021-04-28 RX ORDER — ACETAMINOPHEN 325 MG/1
650 TABLET ORAL EVERY 6 HOURS PRN
Status: DISCONTINUED | OUTPATIENT
Start: 2021-04-28 | End: 2021-05-01 | Stop reason: HOSPADM

## 2021-04-28 RX ORDER — TRAMADOL HYDROCHLORIDE 50 MG/1
25 TABLET ORAL EVERY 8 HOURS PRN
Status: DISCONTINUED | OUTPATIENT
Start: 2021-04-28 | End: 2021-05-01 | Stop reason: HOSPADM

## 2021-04-28 RX ADMIN — SUCRALFATE 1 G: 1 TABLET ORAL at 06:04

## 2021-04-28 RX ADMIN — ATORVASTATIN CALCIUM 40 MG: 20 TABLET, FILM COATED ORAL at 08:58

## 2021-04-28 RX ADMIN — IPRATROPIUM BROMIDE AND ALBUTEROL SULFATE 3 ML: 2.5; .5 SOLUTION RESPIRATORY (INHALATION) at 14:07

## 2021-04-28 RX ADMIN — SUCRALFATE 1 G: 1 TABLET ORAL at 18:54

## 2021-04-28 RX ADMIN — ACETAMINOPHEN 650 MG: 325 TABLET, FILM COATED ORAL at 15:58

## 2021-04-28 RX ADMIN — DULOXETINE HYDROCHLORIDE 20 MG: 20 CAPSULE, DELAYED RELEASE ORAL at 08:58

## 2021-04-28 RX ADMIN — GABAPENTIN 400 MG: 400 CAPSULE ORAL at 21:04

## 2021-04-28 RX ADMIN — TRAMADOL HYDROCHLORIDE 25 MG: 50 TABLET ORAL at 21:04

## 2021-04-28 RX ADMIN — GABAPENTIN 400 MG: 400 CAPSULE ORAL at 15:57

## 2021-04-28 RX ADMIN — SODIUM CHLORIDE, PRESERVATIVE FREE 10 ML: 5 INJECTION INTRAVENOUS at 20:12

## 2021-04-28 RX ADMIN — INSULIN GLARGINE 40 UNITS: 100 INJECTION, SOLUTION SUBCUTANEOUS at 12:10

## 2021-04-28 RX ADMIN — METOPROLOL TARTRATE 25 MG: 25 TABLET, FILM COATED ORAL at 08:58

## 2021-04-28 RX ADMIN — INSULIN LISPRO 12 UNITS: 100 INJECTION, SOLUTION INTRAVENOUS; SUBCUTANEOUS at 12:09

## 2021-04-28 RX ADMIN — AMLODIPINE BESYLATE 5 MG: 5 TABLET ORAL at 08:58

## 2021-04-28 RX ADMIN — HEPARIN SODIUM 15 UNITS/KG/HR: 10000 INJECTION, SOLUTION INTRAVENOUS at 20:07

## 2021-04-28 RX ADMIN — NICOTINE 1 PATCH: 14 PATCH, EXTENDED RELEASE TRANSDERMAL at 08:58

## 2021-04-28 RX ADMIN — SUCRALFATE 1 G: 1 TABLET ORAL at 11:00

## 2021-04-28 RX ADMIN — INSULIN LISPRO 20 UNITS: 100 INJECTION, SOLUTION INTRAVENOUS; SUBCUTANEOUS at 15:58

## 2021-04-28 RX ADMIN — SODIUM CHLORIDE, PRESERVATIVE FREE 10 ML: 5 INJECTION INTRAVENOUS at 08:59

## 2021-04-28 RX ADMIN — IPRATROPIUM BROMIDE AND ALBUTEROL SULFATE 3 ML: 2.5; .5 SOLUTION RESPIRATORY (INHALATION) at 21:11

## 2021-04-28 RX ADMIN — OXYBUTYNIN CHLORIDE 5 MG: 5 TABLET, EXTENDED RELEASE ORAL at 08:58

## 2021-04-28 RX ADMIN — ASPIRIN 81 MG: 81 TABLET, FILM COATED ORAL at 08:58

## 2021-04-28 RX ADMIN — GABAPENTIN 400 MG: 400 CAPSULE ORAL at 06:04

## 2021-04-28 RX ADMIN — METOPROLOL TARTRATE 25 MG: 25 TABLET, FILM COATED ORAL at 21:04

## 2021-04-28 RX ADMIN — HEPARIN SODIUM 15 UNITS/KG/HR: 10000 INJECTION, SOLUTION INTRAVENOUS at 06:04

## 2021-04-28 RX ADMIN — IPRATROPIUM BROMIDE AND ALBUTEROL SULFATE 3 ML: 2.5; .5 SOLUTION RESPIRATORY (INHALATION) at 07:04

## 2021-04-28 RX ADMIN — FERROUS SULFATE TAB 325 MG (65 MG ELEMENTAL FE) 325 MG: 325 (65 FE) TAB at 08:58

## 2021-04-28 RX ADMIN — DOCUSATE SODIUM 50MG AND SENNOSIDES 8.6MG 2 TABLET: 8.6; 5 TABLET, FILM COATED ORAL at 08:58

## 2021-04-28 RX ADMIN — TRAMADOL HYDROCHLORIDE 25 MG: 50 TABLET ORAL at 12:08

## 2021-04-28 RX ADMIN — INSULIN LISPRO 12 UNITS: 100 INJECTION, SOLUTION INTRAVENOUS; SUBCUTANEOUS at 06:49

## 2021-04-28 RX ADMIN — INSULIN GLARGINE 40 UNITS: 100 INJECTION, SOLUTION SUBCUTANEOUS at 21:03

## 2021-04-28 RX ADMIN — ISOSORBIDE MONONITRATE 60 MG: 60 TABLET ORAL at 08:58

## 2021-04-28 RX ADMIN — FUROSEMIDE 60 MG: 20 TABLET ORAL at 08:58

## 2021-04-28 RX ADMIN — PANTOPRAZOLE SODIUM 40 MG: 40 TABLET, DELAYED RELEASE ORAL at 21:04

## 2021-04-28 RX ADMIN — SUCRALFATE 1 G: 1 TABLET ORAL at 15:57

## 2021-04-29 LAB
ALBUMIN SERPL-MCNC: 3.1 G/DL (ref 3.5–5.2)
ANION GAP SERPL CALCULATED.3IONS-SCNC: 14 MMOL/L (ref 5–15)
APTT PPP: 62.7 SECONDS (ref 22.7–35.4)
BASOPHILS # BLD AUTO: 0.1 10*3/MM3 (ref 0–0.2)
BASOPHILS NFR BLD AUTO: 1.1 % (ref 0–1.5)
BUN SERPL-MCNC: 46 MG/DL (ref 8–23)
BUN/CREAT SERPL: 20.6 (ref 7–25)
CALCIUM SPEC-SCNC: 9.6 MG/DL (ref 8.6–10.5)
CHLORIDE SERPL-SCNC: 98 MMOL/L (ref 98–107)
CO2 SERPL-SCNC: 20 MMOL/L (ref 22–29)
CREAT SERPL-MCNC: 2.23 MG/DL (ref 0.57–1)
DEPRECATED RDW RBC AUTO: 44.4 FL (ref 37–54)
EOSINOPHIL # BLD AUTO: 0.31 10*3/MM3 (ref 0–0.4)
EOSINOPHIL NFR BLD AUTO: 3.5 % (ref 0.3–6.2)
ERYTHROCYTE [DISTWIDTH] IN BLOOD BY AUTOMATED COUNT: 14.2 % (ref 12.3–15.4)
GFR SERPL CREATININE-BSD FRML MDRD: 22 ML/MIN/1.73
GLUCOSE BLDC GLUCOMTR-MCNC: 277 MG/DL (ref 70–130)
GLUCOSE BLDC GLUCOMTR-MCNC: 282 MG/DL (ref 70–130)
GLUCOSE BLDC GLUCOMTR-MCNC: 320 MG/DL (ref 70–130)
GLUCOSE BLDC GLUCOMTR-MCNC: 413 MG/DL (ref 70–130)
GLUCOSE SERPL-MCNC: 266 MG/DL (ref 65–99)
HCT VFR BLD AUTO: 29.5 % (ref 34–46.6)
HGB BLD-MCNC: 9.4 G/DL (ref 12–15.9)
IMM GRANULOCYTES # BLD AUTO: 0.15 10*3/MM3 (ref 0–0.05)
IMM GRANULOCYTES NFR BLD AUTO: 1.7 % (ref 0–0.5)
LYMPHOCYTES # BLD AUTO: 2.64 10*3/MM3 (ref 0.7–3.1)
LYMPHOCYTES NFR BLD AUTO: 29.9 % (ref 19.6–45.3)
MCH RBC QN AUTO: 27.9 PG (ref 26.6–33)
MCHC RBC AUTO-ENTMCNC: 31.9 G/DL (ref 31.5–35.7)
MCV RBC AUTO: 87.5 FL (ref 79–97)
MONOCYTES # BLD AUTO: 0.58 10*3/MM3 (ref 0.1–0.9)
MONOCYTES NFR BLD AUTO: 6.6 % (ref 5–12)
NEUTROPHILS NFR BLD AUTO: 5.05 10*3/MM3 (ref 1.7–7)
NEUTROPHILS NFR BLD AUTO: 57.2 % (ref 42.7–76)
NRBC BLD AUTO-RTO: 0 /100 WBC (ref 0–0.2)
PHOSPHATE SERPL-MCNC: 4.4 MG/DL (ref 2.5–4.5)
PLATELET # BLD AUTO: 325 10*3/MM3 (ref 140–450)
PMV BLD AUTO: 8.9 FL (ref 6–12)
POTASSIUM SERPL-SCNC: 4.7 MMOL/L (ref 3.5–5.2)
RBC # BLD AUTO: 3.37 10*6/MM3 (ref 3.77–5.28)
SODIUM SERPL-SCNC: 132 MMOL/L (ref 136–145)
TROPONIN T SERPL-MCNC: 0.65 NG/ML (ref 0–0.03)
WBC # BLD AUTO: 8.83 10*3/MM3 (ref 3.4–10.8)

## 2021-04-29 PROCEDURE — 82962 GLUCOSE BLOOD TEST: CPT

## 2021-04-29 PROCEDURE — 94799 UNLISTED PULMONARY SVC/PX: CPT

## 2021-04-29 PROCEDURE — 63710000001 INSULIN LISPRO (HUMAN) PER 5 UNITS: Performed by: NURSE PRACTITIONER

## 2021-04-29 PROCEDURE — 94660 CPAP INITIATION&MGMT: CPT

## 2021-04-29 PROCEDURE — 63710000001 INSULIN GLARGINE PER 5 UNITS: Performed by: NURSE PRACTITIONER

## 2021-04-29 PROCEDURE — 99232 SBSQ HOSP IP/OBS MODERATE 35: CPT | Performed by: NURSE PRACTITIONER

## 2021-04-29 PROCEDURE — 84484 ASSAY OF TROPONIN QUANT: CPT | Performed by: NURSE PRACTITIONER

## 2021-04-29 PROCEDURE — 99232 SBSQ HOSP IP/OBS MODERATE 35: CPT | Performed by: INTERNAL MEDICINE

## 2021-04-29 PROCEDURE — 80069 RENAL FUNCTION PANEL: CPT | Performed by: INTERNAL MEDICINE

## 2021-04-29 PROCEDURE — 85730 THROMBOPLASTIN TIME PARTIAL: CPT | Performed by: NURSE PRACTITIONER

## 2021-04-29 PROCEDURE — 85025 COMPLETE CBC W/AUTO DIFF WBC: CPT | Performed by: NURSE PRACTITIONER

## 2021-04-29 PROCEDURE — 25010000002 HEPARIN (PORCINE) PER 1000 UNITS: Performed by: NURSE PRACTITIONER

## 2021-04-29 RX ORDER — AMLODIPINE BESYLATE 5 MG/1
5 TABLET ORAL ONCE
Status: COMPLETED | OUTPATIENT
Start: 2021-04-29 | End: 2021-04-29

## 2021-04-29 RX ORDER — AMLODIPINE BESYLATE 10 MG/1
10 TABLET ORAL
Status: DISCONTINUED | OUTPATIENT
Start: 2021-04-30 | End: 2021-05-01 | Stop reason: HOSPADM

## 2021-04-29 RX ORDER — SODIUM CHLORIDE 9 MG/ML
50 INJECTION, SOLUTION INTRAVENOUS CONTINUOUS
Status: DISCONTINUED | OUTPATIENT
Start: 2021-04-30 | End: 2021-05-01

## 2021-04-29 RX ADMIN — SUCRALFATE 1 G: 1 TABLET ORAL at 05:59

## 2021-04-29 RX ADMIN — INSULIN LISPRO 12 UNITS: 100 INJECTION, SOLUTION INTRAVENOUS; SUBCUTANEOUS at 18:03

## 2021-04-29 RX ADMIN — DOCUSATE SODIUM 50MG AND SENNOSIDES 8.6MG 2 TABLET: 8.6; 5 TABLET, FILM COATED ORAL at 09:59

## 2021-04-29 RX ADMIN — INSULIN GLARGINE 40 UNITS: 100 INJECTION, SOLUTION SUBCUTANEOUS at 22:28

## 2021-04-29 RX ADMIN — SODIUM CHLORIDE, PRESERVATIVE FREE 10 ML: 5 INJECTION INTRAVENOUS at 22:42

## 2021-04-29 RX ADMIN — AMLODIPINE BESYLATE 5 MG: 5 TABLET ORAL at 14:52

## 2021-04-29 RX ADMIN — HEPARIN SODIUM 15 UNITS/KG/HR: 10000 INJECTION, SOLUTION INTRAVENOUS at 10:03

## 2021-04-29 RX ADMIN — NICOTINE 1 PATCH: 14 PATCH, EXTENDED RELEASE TRANSDERMAL at 10:00

## 2021-04-29 RX ADMIN — FERROUS SULFATE TAB 325 MG (65 MG ELEMENTAL FE) 325 MG: 325 (65 FE) TAB at 10:00

## 2021-04-29 RX ADMIN — ATORVASTATIN CALCIUM 40 MG: 20 TABLET, FILM COATED ORAL at 09:59

## 2021-04-29 RX ADMIN — INSULIN GLARGINE 40 UNITS: 100 INJECTION, SOLUTION SUBCUTANEOUS at 09:59

## 2021-04-29 RX ADMIN — METOPROLOL TARTRATE 25 MG: 25 TABLET, FILM COATED ORAL at 22:29

## 2021-04-29 RX ADMIN — DOCUSATE SODIUM 50MG AND SENNOSIDES 8.6MG 2 TABLET: 8.6; 5 TABLET, FILM COATED ORAL at 22:28

## 2021-04-29 RX ADMIN — ASPIRIN 81 MG: 81 TABLET, FILM COATED ORAL at 09:59

## 2021-04-29 RX ADMIN — IPRATROPIUM BROMIDE AND ALBUTEROL SULFATE 3 ML: 2.5; .5 SOLUTION RESPIRATORY (INHALATION) at 20:24

## 2021-04-29 RX ADMIN — SUCRALFATE 1 G: 1 TABLET ORAL at 14:53

## 2021-04-29 RX ADMIN — PANTOPRAZOLE SODIUM 40 MG: 40 TABLET, DELAYED RELEASE ORAL at 22:28

## 2021-04-29 RX ADMIN — INSULIN LISPRO 16 UNITS: 100 INJECTION, SOLUTION INTRAVENOUS; SUBCUTANEOUS at 12:50

## 2021-04-29 RX ADMIN — INSULIN LISPRO 12 UNITS: 100 INJECTION, SOLUTION INTRAVENOUS; SUBCUTANEOUS at 05:59

## 2021-04-29 RX ADMIN — SUCRALFATE 1 G: 1 TABLET ORAL at 09:59

## 2021-04-29 RX ADMIN — ISOSORBIDE MONONITRATE 60 MG: 60 TABLET ORAL at 10:00

## 2021-04-29 RX ADMIN — GABAPENTIN 400 MG: 400 CAPSULE ORAL at 14:52

## 2021-04-29 RX ADMIN — FUROSEMIDE 60 MG: 20 TABLET ORAL at 09:59

## 2021-04-29 RX ADMIN — AMLODIPINE BESYLATE 5 MG: 5 TABLET ORAL at 09:59

## 2021-04-29 RX ADMIN — GABAPENTIN 400 MG: 400 CAPSULE ORAL at 05:59

## 2021-04-29 RX ADMIN — OXYBUTYNIN CHLORIDE 5 MG: 5 TABLET, EXTENDED RELEASE ORAL at 09:59

## 2021-04-29 RX ADMIN — METOPROLOL TARTRATE 25 MG: 25 TABLET, FILM COATED ORAL at 12:50

## 2021-04-29 RX ADMIN — DULOXETINE HYDROCHLORIDE 20 MG: 20 CAPSULE, DELAYED RELEASE ORAL at 09:59

## 2021-04-29 RX ADMIN — TRAMADOL HYDROCHLORIDE 25 MG: 50 TABLET ORAL at 10:11

## 2021-04-29 RX ADMIN — IPRATROPIUM BROMIDE AND ALBUTEROL SULFATE 3 ML: 2.5; .5 SOLUTION RESPIRATORY (INHALATION) at 06:41

## 2021-04-29 RX ADMIN — GABAPENTIN 400 MG: 400 CAPSULE ORAL at 22:41

## 2021-04-29 RX ADMIN — IPRATROPIUM BROMIDE AND ALBUTEROL SULFATE 3 ML: 2.5; .5 SOLUTION RESPIRATORY (INHALATION) at 11:19

## 2021-04-30 ENCOUNTER — TELEPHONE (OUTPATIENT)
Dept: FAMILY MEDICINE CLINIC | Facility: CLINIC | Age: 61
End: 2021-04-30

## 2021-04-30 LAB
ACT BLD: 345 SECONDS (ref 82–152)
ACT BLD: 351 SECONDS (ref 82–152)
ALBUMIN SERPL-MCNC: 3.4 G/DL (ref 3.5–5.2)
ANION GAP SERPL CALCULATED.3IONS-SCNC: 11.9 MMOL/L (ref 5–15)
APTT PPP: 62.7 SECONDS (ref 22.7–35.4)
BASOPHILS # BLD AUTO: 0.12 10*3/MM3 (ref 0–0.2)
BASOPHILS NFR BLD AUTO: 1.3 % (ref 0–1.5)
BUN SERPL-MCNC: 54 MG/DL (ref 8–23)
BUN/CREAT SERPL: 24.3 (ref 7–25)
CALCIUM SPEC-SCNC: 9.9 MG/DL (ref 8.6–10.5)
CHLORIDE SERPL-SCNC: 98 MMOL/L (ref 98–107)
CO2 SERPL-SCNC: 21.1 MMOL/L (ref 22–29)
CREAT SERPL-MCNC: 2.22 MG/DL (ref 0.57–1)
DEPRECATED RDW RBC AUTO: 44.7 FL (ref 37–54)
EOSINOPHIL # BLD AUTO: 0.33 10*3/MM3 (ref 0–0.4)
EOSINOPHIL NFR BLD AUTO: 3.5 % (ref 0.3–6.2)
ERYTHROCYTE [DISTWIDTH] IN BLOOD BY AUTOMATED COUNT: 14.1 % (ref 12.3–15.4)
GFR SERPL CREATININE-BSD FRML MDRD: 22 ML/MIN/1.73
GLUCOSE BLDC GLUCOMTR-MCNC: 214 MG/DL (ref 70–130)
GLUCOSE BLDC GLUCOMTR-MCNC: 252 MG/DL (ref 70–130)
GLUCOSE BLDC GLUCOMTR-MCNC: 267 MG/DL (ref 70–130)
GLUCOSE BLDC GLUCOMTR-MCNC: 310 MG/DL (ref 70–130)
GLUCOSE SERPL-MCNC: 259 MG/DL (ref 65–99)
HCT VFR BLD AUTO: 31.6 % (ref 34–46.6)
HCT VFR BLDA CALC: 41 % (ref 38–51)
HCT VFR BLDA CALC: 43 % (ref 38–51)
HGB BLD-MCNC: 10.1 G/DL (ref 12–15.9)
HGB BLDA-MCNC: 13.9 G/DL (ref 12–17)
HGB BLDA-MCNC: 14.6 G/DL (ref 12–17)
IMM GRANULOCYTES # BLD AUTO: 0.19 10*3/MM3 (ref 0–0.05)
IMM GRANULOCYTES NFR BLD AUTO: 2 % (ref 0–0.5)
LYMPHOCYTES # BLD AUTO: 3.05 10*3/MM3 (ref 0.7–3.1)
LYMPHOCYTES NFR BLD AUTO: 32.2 % (ref 19.6–45.3)
MCH RBC QN AUTO: 27.9 PG (ref 26.6–33)
MCHC RBC AUTO-ENTMCNC: 32 G/DL (ref 31.5–35.7)
MCV RBC AUTO: 87.3 FL (ref 79–97)
MONOCYTES # BLD AUTO: 0.63 10*3/MM3 (ref 0.1–0.9)
MONOCYTES NFR BLD AUTO: 6.7 % (ref 5–12)
NEUTROPHILS NFR BLD AUTO: 5.15 10*3/MM3 (ref 1.7–7)
NEUTROPHILS NFR BLD AUTO: 54.3 % (ref 42.7–76)
NRBC BLD AUTO-RTO: 0 /100 WBC (ref 0–0.2)
PHOSPHATE SERPL-MCNC: 4.5 MG/DL (ref 2.5–4.5)
PLATELET # BLD AUTO: 351 10*3/MM3 (ref 140–450)
PMV BLD AUTO: 8.9 FL (ref 6–12)
POTASSIUM SERPL-SCNC: 4.5 MMOL/L (ref 3.5–5.2)
RBC # BLD AUTO: 3.62 10*6/MM3 (ref 3.77–5.28)
SAO2 % BLDA: 93 % (ref 95–98)
SAO2 % BLDA: 93 % (ref 95–98)
SODIUM SERPL-SCNC: 131 MMOL/L (ref 136–145)
WBC # BLD AUTO: 9.47 10*3/MM3 (ref 3.4–10.8)

## 2021-04-30 PROCEDURE — 63710000001 INSULIN LISPRO (HUMAN) PER 5 UNITS: Performed by: NURSE PRACTITIONER

## 2021-04-30 PROCEDURE — 85025 COMPLETE CBC W/AUTO DIFF WBC: CPT | Performed by: NURSE PRACTITIONER

## 2021-04-30 PROCEDURE — 94799 UNLISTED PULMONARY SVC/PX: CPT

## 2021-04-30 PROCEDURE — 25010000002 HEPARIN (PORCINE) PER 1000 UNITS: Performed by: INTERNAL MEDICINE

## 2021-04-30 PROCEDURE — C1874 STENT, COATED/COV W/DEL SYS: HCPCS | Performed by: INTERNAL MEDICINE

## 2021-04-30 PROCEDURE — C1725 CATH, TRANSLUMIN NON-LASER: HCPCS | Performed by: INTERNAL MEDICINE

## 2021-04-30 PROCEDURE — C1894 INTRO/SHEATH, NON-LASER: HCPCS | Performed by: INTERNAL MEDICINE

## 2021-04-30 PROCEDURE — 25010000002 FENTANYL CITRATE (PF) 100 MCG/2ML SOLUTION: Performed by: INTERNAL MEDICINE

## 2021-04-30 PROCEDURE — 85018 HEMOGLOBIN: CPT

## 2021-04-30 PROCEDURE — C1887 CATHETER, GUIDING: HCPCS | Performed by: INTERNAL MEDICINE

## 2021-04-30 PROCEDURE — C9600 PERC DRUG-EL COR STENT SING: HCPCS | Performed by: INTERNAL MEDICINE

## 2021-04-30 PROCEDURE — B2131ZZ FLUOROSCOPY OF MULTIPLE CORONARY ARTERY BYPASS GRAFTS USING LOW OSMOLAR CONTRAST: ICD-10-PCS | Performed by: INTERNAL MEDICINE

## 2021-04-30 PROCEDURE — 99152 MOD SED SAME PHYS/QHP 5/>YRS: CPT | Performed by: INTERNAL MEDICINE

## 2021-04-30 PROCEDURE — 63710000001 INSULIN GLARGINE PER 5 UNITS: Performed by: INTERNAL MEDICINE

## 2021-04-30 PROCEDURE — 80069 RENAL FUNCTION PANEL: CPT | Performed by: INTERNAL MEDICINE

## 2021-04-30 PROCEDURE — 02703ZZ DILATION OF CORONARY ARTERY, ONE ARTERY, PERCUTANEOUS APPROACH: ICD-10-PCS | Performed by: INTERNAL MEDICINE

## 2021-04-30 PROCEDURE — C1769 GUIDE WIRE: HCPCS | Performed by: INTERNAL MEDICINE

## 2021-04-30 PROCEDURE — 85730 THROMBOPLASTIN TIME PARTIAL: CPT | Performed by: NURSE PRACTITIONER

## 2021-04-30 PROCEDURE — 0 IOPAMIDOL PER 1 ML: Performed by: INTERNAL MEDICINE

## 2021-04-30 PROCEDURE — 82962 GLUCOSE BLOOD TEST: CPT

## 2021-04-30 PROCEDURE — 99153 MOD SED SAME PHYS/QHP EA: CPT | Performed by: INTERNAL MEDICINE

## 2021-04-30 PROCEDURE — 25010000002 HEPARIN (PORCINE) PER 1000 UNITS: Performed by: NURSE PRACTITIONER

## 2021-04-30 PROCEDURE — 25010000002 MIDAZOLAM PER 1 MG: Performed by: INTERNAL MEDICINE

## 2021-04-30 PROCEDURE — 99232 SBSQ HOSP IP/OBS MODERATE 35: CPT | Performed by: NURSE PRACTITIONER

## 2021-04-30 PROCEDURE — 92920 PRQ TRLUML C ANGIOP 1ART&/BR: CPT | Performed by: INTERNAL MEDICINE

## 2021-04-30 PROCEDURE — 85014 HEMATOCRIT: CPT

## 2021-04-30 PROCEDURE — 92928 PRQ TCAT PLMT NTRAC ST 1 LES: CPT | Performed by: INTERNAL MEDICINE

## 2021-04-30 PROCEDURE — C9604 PERC D-E COR REVASC T CABG S: HCPCS | Performed by: INTERNAL MEDICINE

## 2021-04-30 PROCEDURE — 0272356 DILATION OF CORONARY ARTERY, THREE ARTERIES, BIFURCATION, WITH TWO DRUG-ELUTING INTRALUMINAL DEVICES, PERCUTANEOUS APPROACH: ICD-10-PCS | Performed by: INTERNAL MEDICINE

## 2021-04-30 PROCEDURE — 93005 ELECTROCARDIOGRAM TRACING: CPT | Performed by: INTERNAL MEDICINE

## 2021-04-30 PROCEDURE — 4A023N7 MEASUREMENT OF CARDIAC SAMPLING AND PRESSURE, LEFT HEART, PERCUTANEOUS APPROACH: ICD-10-PCS | Performed by: INTERNAL MEDICINE

## 2021-04-30 PROCEDURE — 92937 PRQ TRLUML REVSC CAB GRF 1: CPT | Performed by: INTERNAL MEDICINE

## 2021-04-30 PROCEDURE — 85347 COAGULATION TIME ACTIVATED: CPT

## 2021-04-30 DEVICE — XIENCE SIERRA™ EVEROLIMUS ELUTING CORONARY STENT SYSTEM 3.00 MM X 28 MM / RAPID-EXCHANGE
Type: IMPLANTABLE DEVICE | Status: FUNCTIONAL
Brand: XIENCE SIERRA™

## 2021-04-30 DEVICE — XIENCE SIERRA™ EVEROLIMUS ELUTING CORONARY STENT SYSTEM 2.75 MM X 28 MM / RAPID-EXCHANGE
Type: IMPLANTABLE DEVICE | Status: FUNCTIONAL
Brand: XIENCE SIERRA™

## 2021-04-30 RX ORDER — FENTANYL CITRATE 50 UG/ML
INJECTION, SOLUTION INTRAMUSCULAR; INTRAVENOUS AS NEEDED
Status: DISCONTINUED | OUTPATIENT
Start: 2021-04-30 | End: 2021-04-30 | Stop reason: HOSPADM

## 2021-04-30 RX ORDER — LIDOCAINE HYDROCHLORIDE 20 MG/ML
INJECTION, SOLUTION INFILTRATION; PERINEURAL AS NEEDED
Status: DISCONTINUED | OUTPATIENT
Start: 2021-04-30 | End: 2021-04-30 | Stop reason: HOSPADM

## 2021-04-30 RX ORDER — SODIUM CHLORIDE 9 MG/ML
50 INJECTION, SOLUTION INTRAVENOUS CONTINUOUS
Status: ACTIVE | OUTPATIENT
Start: 2021-04-30 | End: 2021-04-30

## 2021-04-30 RX ORDER — HEPARIN SODIUM 1000 [USP'U]/ML
INJECTION, SOLUTION INTRAVENOUS; SUBCUTANEOUS AS NEEDED
Status: DISCONTINUED | OUTPATIENT
Start: 2021-04-30 | End: 2021-04-30 | Stop reason: HOSPADM

## 2021-04-30 RX ORDER — MIDAZOLAM HYDROCHLORIDE 1 MG/ML
INJECTION INTRAMUSCULAR; INTRAVENOUS AS NEEDED
Status: DISCONTINUED | OUTPATIENT
Start: 2021-04-30 | End: 2021-04-30 | Stop reason: HOSPADM

## 2021-04-30 RX ORDER — CLOPIDOGREL BISULFATE 75 MG/1
TABLET ORAL AS NEEDED
Status: DISCONTINUED | OUTPATIENT
Start: 2021-04-30 | End: 2021-04-30 | Stop reason: HOSPADM

## 2021-04-30 RX ORDER — CLOPIDOGREL BISULFATE 75 MG/1
75 TABLET ORAL DAILY
Status: DISCONTINUED | OUTPATIENT
Start: 2021-05-01 | End: 2021-05-01 | Stop reason: HOSPADM

## 2021-04-30 RX ADMIN — DOCUSATE SODIUM 50MG AND SENNOSIDES 8.6MG 2 TABLET: 8.6; 5 TABLET, FILM COATED ORAL at 21:58

## 2021-04-30 RX ADMIN — GABAPENTIN 400 MG: 400 CAPSULE ORAL at 21:59

## 2021-04-30 RX ADMIN — INSULIN LISPRO 12 UNITS: 100 INJECTION, SOLUTION INTRAVENOUS; SUBCUTANEOUS at 06:58

## 2021-04-30 RX ADMIN — IPRATROPIUM BROMIDE AND ALBUTEROL SULFATE 3 ML: 2.5; .5 SOLUTION RESPIRATORY (INHALATION) at 07:14

## 2021-04-30 RX ADMIN — HEPARIN SODIUM 15 UNITS/KG/HR: 10000 INJECTION, SOLUTION INTRAVENOUS at 00:39

## 2021-04-30 RX ADMIN — INSULIN GLARGINE 40 UNITS: 100 INJECTION, SOLUTION SUBCUTANEOUS at 22:10

## 2021-04-30 RX ADMIN — IPRATROPIUM BROMIDE AND ALBUTEROL SULFATE 3 ML: 2.5; .5 SOLUTION RESPIRATORY (INHALATION) at 12:19

## 2021-04-30 RX ADMIN — METOPROLOL TARTRATE 25 MG: 25 TABLET, FILM COATED ORAL at 09:43

## 2021-04-30 RX ADMIN — NICOTINE 1 PATCH: 14 PATCH, EXTENDED RELEASE TRANSDERMAL at 09:44

## 2021-04-30 RX ADMIN — IPRATROPIUM BROMIDE AND ALBUTEROL SULFATE 3 ML: 2.5; .5 SOLUTION RESPIRATORY (INHALATION) at 20:47

## 2021-04-30 RX ADMIN — OXYBUTYNIN CHLORIDE 5 MG: 5 TABLET, EXTENDED RELEASE ORAL at 09:44

## 2021-04-30 RX ADMIN — ATORVASTATIN CALCIUM 40 MG: 20 TABLET, FILM COATED ORAL at 09:43

## 2021-04-30 RX ADMIN — DULOXETINE HYDROCHLORIDE 20 MG: 20 CAPSULE, DELAYED RELEASE ORAL at 09:43

## 2021-04-30 RX ADMIN — SUCRALFATE 1 G: 1 TABLET ORAL at 06:55

## 2021-04-30 RX ADMIN — SUCRALFATE 1 G: 1 TABLET ORAL at 09:43

## 2021-04-30 RX ADMIN — GABAPENTIN 400 MG: 400 CAPSULE ORAL at 17:48

## 2021-04-30 RX ADMIN — SODIUM CHLORIDE 50 ML/HR: 9 INJECTION, SOLUTION INTRAVENOUS at 10:10

## 2021-04-30 RX ADMIN — ASPIRIN 81 MG: 81 TABLET, FILM COATED ORAL at 09:43

## 2021-04-30 RX ADMIN — GABAPENTIN 400 MG: 400 CAPSULE ORAL at 06:55

## 2021-04-30 RX ADMIN — METOPROLOL TARTRATE 25 MG: 25 TABLET, FILM COATED ORAL at 21:58

## 2021-04-30 RX ADMIN — SODIUM CHLORIDE, PRESERVATIVE FREE 10 ML: 5 INJECTION INTRAVENOUS at 22:10

## 2021-04-30 RX ADMIN — PANTOPRAZOLE SODIUM 40 MG: 40 TABLET, DELAYED RELEASE ORAL at 21:58

## 2021-04-30 RX ADMIN — ISOSORBIDE MONONITRATE 60 MG: 60 TABLET ORAL at 09:44

## 2021-04-30 RX ADMIN — AMLODIPINE BESYLATE 10 MG: 10 TABLET ORAL at 09:43

## 2021-04-30 RX ADMIN — FERROUS SULFATE TAB 325 MG (65 MG ELEMENTAL FE) 325 MG: 325 (65 FE) TAB at 09:44

## 2021-04-30 NOTE — TELEPHONE ENCOUNTER
They call and wanted to know when she gets out of the Hospital if they can go back out when she gets out of the Hospital. I talk to Dr. Calvillo and he said that it would be good for them to go back out there.

## 2021-05-01 ENCOUNTER — READMISSION MANAGEMENT (OUTPATIENT)
Dept: CALL CENTER | Facility: HOSPITAL | Age: 61
End: 2021-05-01

## 2021-05-01 VITALS
HEART RATE: 68 BPM | OXYGEN SATURATION: 98 % | HEIGHT: 62 IN | SYSTOLIC BLOOD PRESSURE: 117 MMHG | RESPIRATION RATE: 18 BRPM | BODY MASS INDEX: 48.6 KG/M2 | WEIGHT: 264.1 LBS | TEMPERATURE: 97.8 F | DIASTOLIC BLOOD PRESSURE: 82 MMHG

## 2021-05-01 LAB
ALBUMIN SERPL-MCNC: 3.3 G/DL (ref 3.5–5.2)
ANION GAP SERPL CALCULATED.3IONS-SCNC: 15.4 MMOL/L (ref 5–15)
APTT PPP: 33.1 SECONDS (ref 22.7–35.4)
BASOPHILS # BLD AUTO: 0.11 10*3/MM3 (ref 0–0.2)
BASOPHILS NFR BLD AUTO: 1.2 % (ref 0–1.5)
BUN SERPL-MCNC: 56 MG/DL (ref 8–23)
BUN/CREAT SERPL: 25.7 (ref 7–25)
CALCIUM SPEC-SCNC: 9.4 MG/DL (ref 8.6–10.5)
CHLORIDE SERPL-SCNC: 97 MMOL/L (ref 98–107)
CO2 SERPL-SCNC: 20.6 MMOL/L (ref 22–29)
CREAT SERPL-MCNC: 2.18 MG/DL (ref 0.57–1)
DEPRECATED RDW RBC AUTO: 45.7 FL (ref 37–54)
EOSINOPHIL # BLD AUTO: 0.35 10*3/MM3 (ref 0–0.4)
EOSINOPHIL NFR BLD AUTO: 3.7 % (ref 0.3–6.2)
ERYTHROCYTE [DISTWIDTH] IN BLOOD BY AUTOMATED COUNT: 14.5 % (ref 12.3–15.4)
GFR SERPL CREATININE-BSD FRML MDRD: 23 ML/MIN/1.73
GLUCOSE BLDC GLUCOMTR-MCNC: 182 MG/DL (ref 70–130)
GLUCOSE BLDC GLUCOMTR-MCNC: 281 MG/DL (ref 70–130)
GLUCOSE SERPL-MCNC: 168 MG/DL (ref 65–99)
HCT VFR BLD AUTO: 30.3 % (ref 34–46.6)
HGB BLD-MCNC: 10 G/DL (ref 12–15.9)
IMM GRANULOCYTES # BLD AUTO: 0.15 10*3/MM3 (ref 0–0.05)
IMM GRANULOCYTES NFR BLD AUTO: 1.6 % (ref 0–0.5)
LYMPHOCYTES # BLD AUTO: 2.33 10*3/MM3 (ref 0.7–3.1)
LYMPHOCYTES NFR BLD AUTO: 24.4 % (ref 19.6–45.3)
MAGNESIUM SERPL-MCNC: 1.6 MG/DL (ref 1.6–2.4)
MCH RBC QN AUTO: 28.5 PG (ref 26.6–33)
MCHC RBC AUTO-ENTMCNC: 33 G/DL (ref 31.5–35.7)
MCV RBC AUTO: 86.3 FL (ref 79–97)
MONOCYTES # BLD AUTO: 0.62 10*3/MM3 (ref 0.1–0.9)
MONOCYTES NFR BLD AUTO: 6.5 % (ref 5–12)
NEUTROPHILS NFR BLD AUTO: 5.98 10*3/MM3 (ref 1.7–7)
NEUTROPHILS NFR BLD AUTO: 62.6 % (ref 42.7–76)
NRBC BLD AUTO-RTO: 0 /100 WBC (ref 0–0.2)
PHOSPHATE SERPL-MCNC: 5.6 MG/DL (ref 2.5–4.5)
PLATELET # BLD AUTO: 321 10*3/MM3 (ref 140–450)
PMV BLD AUTO: 8.8 FL (ref 6–12)
POTASSIUM SERPL-SCNC: 4.4 MMOL/L (ref 3.5–5.2)
QT INTERVAL: 426 MS
QT INTERVAL: 441 MS
RBC # BLD AUTO: 3.51 10*6/MM3 (ref 3.77–5.28)
SODIUM SERPL-SCNC: 133 MMOL/L (ref 136–145)
WBC # BLD AUTO: 9.54 10*3/MM3 (ref 3.4–10.8)

## 2021-05-01 PROCEDURE — 63710000001 INSULIN LISPRO (HUMAN) PER 5 UNITS: Performed by: INTERNAL MEDICINE

## 2021-05-01 PROCEDURE — 80069 RENAL FUNCTION PANEL: CPT | Performed by: INTERNAL MEDICINE

## 2021-05-01 PROCEDURE — 93010 ELECTROCARDIOGRAM REPORT: CPT | Performed by: INTERNAL MEDICINE

## 2021-05-01 PROCEDURE — 85025 COMPLETE CBC W/AUTO DIFF WBC: CPT | Performed by: INTERNAL MEDICINE

## 2021-05-01 PROCEDURE — 82962 GLUCOSE BLOOD TEST: CPT

## 2021-05-01 PROCEDURE — 93005 ELECTROCARDIOGRAM TRACING: CPT | Performed by: INTERNAL MEDICINE

## 2021-05-01 PROCEDURE — 83735 ASSAY OF MAGNESIUM: CPT | Performed by: INTERNAL MEDICINE

## 2021-05-01 PROCEDURE — 85730 THROMBOPLASTIN TIME PARTIAL: CPT | Performed by: INTERNAL MEDICINE

## 2021-05-01 RX ORDER — ASPIRIN 81 MG/1
81 TABLET ORAL DAILY
Qty: 60 TABLET | Refills: 5 | Status: SHIPPED | OUTPATIENT
Start: 2021-05-01 | End: 2022-12-13

## 2021-05-01 RX ORDER — NICOTINE 21 MG/24HR
1 PATCH, TRANSDERMAL 24 HOURS TRANSDERMAL
Qty: 30 PATCH | Refills: 5 | Status: SHIPPED | OUTPATIENT
Start: 2021-05-01 | End: 2021-05-13

## 2021-05-01 RX ADMIN — DULOXETINE HYDROCHLORIDE 20 MG: 20 CAPSULE, DELAYED RELEASE ORAL at 08:48

## 2021-05-01 RX ADMIN — GABAPENTIN 400 MG: 400 CAPSULE ORAL at 06:54

## 2021-05-01 RX ADMIN — OXYBUTYNIN CHLORIDE 5 MG: 5 TABLET, EXTENDED RELEASE ORAL at 08:49

## 2021-05-01 RX ADMIN — TRAMADOL HYDROCHLORIDE 25 MG: 50 TABLET ORAL at 08:54

## 2021-05-01 RX ADMIN — INSULIN LISPRO 4 UNITS: 100 INJECTION, SOLUTION INTRAVENOUS; SUBCUTANEOUS at 06:54

## 2021-05-01 RX ADMIN — METOPROLOL TARTRATE 25 MG: 25 TABLET, FILM COATED ORAL at 08:48

## 2021-05-01 RX ADMIN — AMLODIPINE BESYLATE 10 MG: 10 TABLET ORAL at 08:48

## 2021-05-01 RX ADMIN — ASPIRIN 81 MG: 81 TABLET, FILM COATED ORAL at 08:48

## 2021-05-01 RX ADMIN — FERROUS SULFATE TAB 325 MG (65 MG ELEMENTAL FE) 325 MG: 325 (65 FE) TAB at 08:49

## 2021-05-01 RX ADMIN — NICOTINE 1 PATCH: 14 PATCH, EXTENDED RELEASE TRANSDERMAL at 08:49

## 2021-05-01 RX ADMIN — ISOSORBIDE MONONITRATE 60 MG: 60 TABLET ORAL at 08:49

## 2021-05-01 RX ADMIN — SUCRALFATE 1 G: 1 TABLET ORAL at 06:54

## 2021-05-01 RX ADMIN — ATORVASTATIN CALCIUM 40 MG: 20 TABLET, FILM COATED ORAL at 08:55

## 2021-05-01 RX ADMIN — CLOPIDOGREL 75 MG: 75 TABLET, FILM COATED ORAL at 08:49

## 2021-05-01 NOTE — OUTREACH NOTE
Prep Survey      Responses   [facility] patient discharged from?  [place]   Is LACE score < 7 ?  No   Emergency Room discharge w/ pulse ox?  No   Eligibility  [facility]   Date of Admission  04/24/21   Date of Discharge  05/01/21   Discharge Disposition  Home or Self Care   Discharge diagnosis   Recurrent coronary disease status post coronary bypass grafting with patent LIMA to LAD   Does the patient have one of the following disease processes/diagnoses(primary or secondary)?  Cardiothoracic surgery   Does the patient have Home health ordered?  Yes   What is the Home health agency?   [facility] (resume care)   Is there a DME ordered?  No   Prep survey completed?  Yes          Danae Banerjee RN

## 2021-05-01 NOTE — PROGRESS NOTES
Dr[address] SAUL Paul    [address]    5/1/2021    Patient ID:  Name:  Yamileth Slater  MRN:  [id number]  1960  61 y.o.  female            CC/Reason for visit: Obstructive sleep apnea, COPD smoking    Interval hx: She denies cough or shortness of breath.  Tells me she has been using the BiPAP here in the hospital at nighttime and she will address with her sleep physician as an outpatient    ROS: No chest pain, no abdominal pain    Vitals:  Vitals:    04/30/21 2315 05/01/21 0336 05/01/21 0735 05/01/21 1101   BP: 134/70 124/69 104/60 117/82   BP Location:  Right arm Left arm Left arm   Patient Position:  Lying Sitting Sitting   Pulse: 77 64 70 68   Resp: 20 18 18 18   Temp: 97.8 °F (36.6 °C) 97.9 °F (36.6 °C) 96.8 °F (36 °C) 97.8 °F (36.6 °C)   TempSrc: Oral Oral Oral Oral   SpO2: 99% 98%     Weight:   120 kg (264 lb 1.6 oz)    Height:               Body mass index is 48.3 kg/m².    Intake/Output Summary (Last 24 hours) at 5/1/2021 1111  Last data filed at 5/1/2021 0800  Gross per 24 hour   Intake 800 ml   Output 400 ml   Net 400 ml       Exam:  GEN:  No distress  Alert, oriented x 3.   LUNGS: Clear breath sounds bilat, no use of accessory muscles  CV:  Normal S1S2, without murmur, no edema  ABD:  Non tender, no enlarged liver or masses      Scheduled meds:  amLODIPine, 10 mg, Oral, Q24H  aspirin, 81 mg, Oral, Daily  atorvastatin, 40 mg, Oral, Daily  clopidogrel, 75 mg, Oral, Daily  DULoxetine, 20 mg, Oral, Daily  ferrous sulfate, 325 mg, Oral, Daily With Breakfast  gabapentin, 400 mg, Oral, Q8H  insulin glargine, 40 Units, Subcutaneous, Q12H  insulin lispro, 0-24 Units, Subcutaneous, TID AC  ipratropium-albuterol, 3 mL, Nebulization, Q6H - RT  isosorbide mononitrate, 60 mg, Oral, Q24H  metoprolol tartrate, 25 mg, Oral, Q12H  nicotine, 1 patch, Transdermal, Q24H  oxybutynin XL, 5 mg, Oral, Daily  pantoprazole, 40 mg, Oral, Nightly  senna-docusate sodium, 2 tablet, Oral, BID  sodium  chloride, 10 mL, Intravenous, Q12H  sucralfate, 1 g, Oral, 4x Daily      IV meds:                           Data Review:   I reviewed the patient's medications and new clinical results.    COVID19   Date Value Ref Range Status   04/23/2021 Not Detected Not Detected - Ref. Range Final         Lab Results   Component Value Date    CALCIUM 9.4 05/01/2021    PHOS 5.6 (H) 05/01/2021    MG 1.6 05/01/2021    MG 1.8 04/01/2021    MG 2.0 03/31/2021     Results from last 7 days   Lab Units 05/01/21  0330 04/30/21  1508 04/30/21  1507 04/30/21  0420 04/29/21  0350   SODIUM mmol/L 133*  --   --  131* 132*   POTASSIUM mmol/L 4.4  --   --  4.5 4.7   CHLORIDE mmol/L 97*  --   --  98 98   CO2 mmol/L 20.6*  --   --  21.1* 20.0*   BUN mg/dL 56*  --   --  54* 46*   CREATININE mg/dL 2.18*  --   --  2.22* 2.23*   CALCIUM mg/dL 9.4  --   --  9.9 9.6   GLUCOSE mg/dL 168*  --   --  259* 266*   WBC 10*3/mm3 9.54  --   --  9.47 8.83   HEMOGLOBIN g/dL 10.0*  --   --  10.1* 9.4*   HEMOGLOBIN, POC g/dL  --  14.6 13.9  --   --    PLATELETS 10*3/mm3 321  --   --  351 325         ASSESSMENT:     CAD (coronary artery disease)  Obstructive sleep apnea  COPD  Smoker      PLAN:  She was counseled again to quit smoking.  She will need formal PFT testing as an outpatient within the next 60 days or so when she has fully recovered from her current hospital admission.  She was advised to follow-up with her sleep physician to address her BiPAP machine.  She needs BiPAP titration study.        Cali Paul MD  5/1/2021

## 2021-05-01 NOTE — DISCHARGE SUMMARY
Date of Admission: 4/24/2021  Date of Discharge:  5/1/2021    Discharge Diagnosis: Recurrent coronary disease status post coronary bypass grafting with patent LIMA to LAD  • **Type 2 diabetes mellitus with hyperosmolar hyperglycemic state (HHS) (CMS/[facility]) [E11.00, E11.65]   Yes   • Cutaneous candidiasis [B37.2]   Yes   • Metabolic acidosis [E87.2]   Yes   • Hyponatremia [E87.1]   Yes   • Hyperkalemia [E87.5]   Yes   • Hypocalcemia [E83.51]   Yes   • Anemia [D64.9]   Yes   • Acute cystitis [N30.00]   Yes   • Acute renal failure superimposed on chronic kidney disease (CMS/[facility]) [N17.9, N18.9]   Yes   • Chronic obstructive lung disease (CMS/[facility]) [J44.9]   Yes   • GERD (gastroesophageal reflux disease) [K21.9]   Yes   • Coronary artery disease [I25.10]   Yes   • Current smoker [F17.200]   Yes           Presenting Problem/History of Present Illness  Acute coronary syndrome (CMS/[facility]) [I24.9]  CAD (coronary artery disease) [I25.10]     Hospital Course  Patient is a 61 y.o. female presented with unstable angina.  Catheterization showed distal left main stenosis with protected open LIMA to LAD.  The VERA does a double cross beneath the sternum so operation was not advisable.  For multiple medical reasons operation was not advisable and we thought it was best to be treated with stenting.  She underwent the procedure below by Dr. Voss.  She did well postoperatively from the stenting and is able to be discharged.  She was counseled on smoking.  I am not sure this will be effective.  She was discharged on nicotine patch.  Plavix and aspirin will be used for the stents..      Procedures Performed  Procedure(s):  Percutaneous Coronary Intervention  Stent NIKKI coronary    Unfractionated heparin was used for anticoagulation from therapeutic ACT.  A 5 Polish XB 3.5 guide was used to engage the left main.  A run-through wire was used to cross the ostial circumflex stenosis and seated distally.  Following this a 2.0 x 15 mm trek balloon  was used to predilate the distal left main and ostial circumflex to 14 eileen.  A 3.0 x 15 mm NC trek balloon was then used to predilate the distal left main and ostial proximal circumflex to 16 eileen.  A 3.0 x 20 mm Xience Gloria drug-eluting stent was then deployed across the distal left main and the proximal circumflex coronary artery at 14 eileen.  This was postdilated with a 3.5 mm NC trek balloon in the distal left main and proximal circumflex to 20 eileen.  Completion angiography of that vessel showed BEATRIZ-3 flow and no complications.  The wire was then pulled back and used to wire the ramus.  The ramus was dilated with a 2.5 x 20 mm trek balloon.  There was a significant waist in the balloon secondary to severe calcification.  As such a 2.5 x 20 mm NC trek balloon was then used to perform angioplasty of the native ramus, however secondary to the severe calcification is ruptured.  The vessel was felt to be too small for a rotational atherectomy and as such I would to relook at the SVG to ramus graft.     A 5 French AL-1 guide was used to engage the SVG to ramus graft.  This was wired with a run-through wire.  I then was able to dilate this vessel with a 2.0 x 12 mm trek balloon to 14 eileen.  I then was able to stent the ostial to proximal SVG to OM with a 2.75 x 28 mm Xience Gloria drug-eluting stent at 14 eileen.  Unfortunately there was a diffuse 80% distal anastomotic stenosis.  This was small caliber and we just performed angioplasty to that with a 2.5 x 20 mm trek balloon with an excellent result.  BEATRIZ-3 flow was documented in the graft.    Consults:   Consults     Date and Time Order Name Status Description    4/24/2021  1:08 PM Inpatient Cardiology Consult Completed     4/24/2021  9:15 AM Inpatient Pulmonology Consult      4/24/2021  8:57 AM Inpatient Nephrology Consult Completed     4/24/2021  3:17 AM Inpatient Cardiothoracic Surgery Consult      4/23/2021 12:26 PM Cardiology - Primary (on-call MD unless specified)  Completed     4/9/2021 12:17 PM Inpatient Gastroenterology Consult Completed           Pertinent Test Results:    Lab Results   Component Value Date    WBC 9.54 05/01/2021    HGB 10.0 (L) 05/01/2021    HCT 30.3 (L) 05/01/2021    MCV 86.3 05/01/2021     05/01/2021      Lab Results   Component Value Date    GLUCOSE 168 (H) 05/01/2021    CALCIUM 9.4 05/01/2021     (L) 05/01/2021    K 4.4 05/01/2021    CO2 20.6 (L) 05/01/2021    CL 97 (L) 05/01/2021    BUN 56 (H) 05/01/2021    CREATININE 2.18 (H) 05/01/2021    EGFRIFNONA 23 (L) 05/01/2021    BCR 25.7 (H) 05/01/2021    ANIONGAP 15.4 (H) 05/01/2021     Lab Results   Component Value Date    INR 1.07 04/24/2021    PROTIME 13.8 04/24/2021         Condition on Discharge: Improved.    Vital Signs  Temp:  [97.7 °F (36.5 °C)-97.9 °F (36.6 °C)] 97.9 °F (36.6 °C)  Heart Rate:  [61-85] 64  Resp:  [16-22] 18  BP: (109-135)/(64-72) 124/69  Body mass index is 48.65 kg/m².    Discharge Disposition  Home or Self Care    Discharge Medications     Discharge Medications      New Medications      Instructions Start Date   aspirin 81 MG EC tablet   81 mg, Oral, Daily      nicotine 14 MG/24HR patch  Commonly known as: NICODERM CQ   1 patch, Transdermal, Every 24 Hours Scheduled         Changes to Medications      Instructions Start Date   metoprolol tartrate 100 MG tablet  Commonly known as: LOPRESSOR  What changed: when to take this   100 mg, Oral, Every 12 Hours Scheduled         Continue These Medications      Instructions Start Date   albuterol sulfate  (90 Base) MCG/ACT inhaler  Commonly known as: PROVENTIL HFA;VENTOLIN HFA;PROAIR HFA   2 puffs, Inhalation, Every 4 Hours PRN      amoxicillin-clavulanate 875-125 MG per tablet  Commonly known as: Augmentin   1 tablet, Oral, Daily      atorvastatin 40 MG tablet  Commonly known as: LIPITOR   40 mg, Oral, Daily      azithromycin 250 MG tablet  Commonly known as: ZITHROMAX   No dose, route, or frequency recorded.       cetirizine 10 MG tablet  Commonly known as: zyrTEC   10 mg, Oral, Daily      clopidogrel 75 MG tablet  Commonly known as: PLAVIX   75 mg, Oral, Daily      [employer] Blood Glucose Meter w/Device kit   1 each, Does not apply, 3 Times Daily      cyclobenzaprine 10 MG tablet  Commonly known as: FLEXERIL   10 mg, Oral, 2 Times Daily PRN      DULoxetine 20 MG capsule  Commonly known as: Cymbalta   20 mg, Oral, Daily      Easy Touch Pen Needles 31G X 8 MM misc  Generic drug: Insulin Pen Needle   No dose, route, or frequency recorded.      NovoFine 30G X 8 MM misc  Generic drug: Insulin Pen Needle   As directed 4 times daily      ferrous sulfate 325 (65 FE) MG tablet  Commonly known as: FeroSul   325 mg, Oral, Daily With Breakfast      FreeStyle Jade 2 Deal device   1 each, Does not apply, Continuous      FreeStyle Jade 2 Sensor misc   1 each, Does not apply, Every 14 Days      gabapentin 800 MG tablet  Commonly known as: NEURONTIN   800 mg, Oral, 3 Times Daily      glucose blood test strip   Use as instructed      isosorbide mononitrate 30 MG 24 hr tablet  Commonly known as: IMDUR   30 mg, Oral, Daily      Jardiance 25 MG tablet  Generic drug: Empagliflozin   25 mg, Oral, Daily      Lantus SoloStar 100 UNIT/ML injection pen  Generic drug: Insulin Glargine   Inject 30 Units under the skin into the appropriate area as directed Every 12 (Twelve) Hours **100 day supply**      lidocaine 5 %  Commonly known as: LIDODERM   PLACE 1 PATCH ON THE SKIN AS DIRECTED BY PROVIDER DAILY. REMOVE & DISCARD PATCH WITHIN 12 HOURS OR AS DIRECTED BY MD      lisinopril 10 MG tablet  Commonly known as: PRINIVIL,ZESTRIL   10 mg, Oral, Daily      montelukast 10 MG tablet  Commonly known as: SINGULAIR   10 mg, Oral, Nightly      nitroglycerin 0.4 MG SL tablet  Commonly known as: NITROSTAT   0.4 mg, Sublingual, As Needed, Take no more than 3 doses in 15 minutes.       NovoLOG FlexPen 100 UNIT/ML solution pen-injector sc pen  Generic drug: insulin  aspart   20 Units, Subcutaneous, 3 Times Daily With Meals      nystatin 309964 UNIT/GM powder  Commonly known as: MYCOSTATIN   Topical, 3 Times Daily      ondansetron ODT 4 MG disintegrating tablet  Commonly known as: Zofran ODT   4 mg, Translingual, Every 8 Hours PRN      oxybutynin XL 5 MG 24 hr tablet  Commonly known as: DITROPAN-XL   5 mg, Oral, Daily      pantoprazole 40 MG EC tablet  Commonly known as: PROTONIX   40 mg, Oral, Nightly      potassium chloride 10 MEQ CR capsule  Commonly known as: MICRO-K   10 mEq, Oral, Daily      promethazine 25 MG tablet  Commonly known as: PHENERGAN   25 mg, Oral, Every 6 Hours PRN      sodium bicarbonate 650 MG tablet   650 mg, Oral, 2 Times Daily      sucralfate 1 g tablet  Commonly known as: CARAFATE   1 g, Oral, 4 Times Daily             Discharge Diet: Renal    Activity at Discharge: Should be set up for cardiac rehab    Follow-up Appointments  Future Appointments   Date Time Provider Department Center   5/28/2021 10:30 AM Nirmal Calvillo MD [facility] None     Additional Instructions for the Follow-ups that You Need to Schedule     Ambulatory Referral to Cardiac Rehab   As directed            Test Results Pending at Discharge       Jaime Estrada MD  05/01/21  07:19 EDT

## 2021-05-01 NOTE — PLAN OF CARE
Problem: Skin Injury Risk Increased  Goal: Skin Health and Integrity  Outcome: Met  Intervention: Optimize Skin Protection  Recent Flowsheet Documentation  Taken 5/1/2021 1000 by Komal Weiss RN  Head of Bed (HOB): HOB at 30-45 degrees  Taken 5/1/2021 0730 by Komal Weiss RN  Head of Bed (HOB): HOB at 30-45 degrees     Problem: Fall Injury Risk  Goal: Absence of Fall and Fall-Related Injury  Outcome: Met  Intervention: Identify and Manage Contributors to Fall Injury Risk  Recent Flowsheet Documentation  Taken 5/1/2021 1000 by Komal Weiss RN  Medication Review/Management: medications reviewed  Taken 5/1/2021 0730 by Komal Weiss RN  Medication Review/Management: medications reviewed  Intervention: Promote Injury-Free Environment  Recent Flowsheet Documentation  Taken 5/1/2021 1000 by Komal Weiss RN  Safety Promotion/Fall Prevention:   activity supervised   assistive device/personal items within reach   toileting scheduled   safety round/check completed   room organization consistent   nonskid shoes/slippers when out of bed  Taken 5/1/2021 0730 by Komal Weiss RN  Safety Promotion/Fall Prevention:   safety round/check completed   room organization consistent   nonskid shoes/slippers when out of bed   fall prevention program maintained   assistive device/personal items within reach     Problem: Adult Inpatient Plan of Care  Goal: Plan of Care Review  Outcome: Met  Goal: Patient-Specific Goal (Individualized)  Outcome: Met  Goal: Absence of Hospital-Acquired Illness or Injury  Outcome: Met  Intervention: Identify and Manage Fall Risk  Recent Flowsheet Documentation  Taken 5/1/2021 1000 by Komal Weiss RN  Safety Promotion/Fall Prevention:   activity supervised   assistive device/personal items within reach   toileting scheduled   safety round/check completed   room organization consistent   nonskid shoes/slippers when out of bed  Taken 5/1/2021 0730 by Komal Weiss RN  Safety Promotion/Fall  Prevention:   safety round/check completed   room organization consistent   nonskid shoes/slippers when out of bed   fall prevention program maintained   assistive device/personal items within reach  Intervention: Prevent Skin Injury  Recent Flowsheet Documentation  Taken 5/1/2021 1000 by Komal Weiss RN  Body Position: position changed independently  Taken 5/1/2021 0730 by Komal Weiss RN  Body Position: position changed independently  Intervention: Prevent Infection  Recent Flowsheet Documentation  Taken 5/1/2021 0730 by Komal Weiss RN  Infection Prevention:   single patient room provided   rest/sleep promoted  Goal: Optimal Comfort and Wellbeing  Outcome: Met  Goal: Readiness for Transition of Care  Outcome: Met     Problem: Adjustment to Illness (Acute Coronary Syndrome)  Goal: Optimal Adaptation to Illness  Outcome: Met     Problem: Arrhythmia/Dysrhythmia (Acute Coronary Syndrome)  Goal: Normalized Cardiac Rhythm  Outcome: Met     Problem: Cardiac-Related Pain (Acute Coronary Syndrome)  Goal: Absence of Cardiac-Related Pain  Outcome: Met     Problem: Hemodynamic Instability (Acute Coronary Syndrome)  Goal: Effective Cardiac Pump Function  Outcome: Met     Problem: Tissue Perfusion (Acute Coronary Syndrome)  Goal: Adequate Tissue Perfusion  Outcome: Met  Intervention: Optimize Cardiac Tissue Perfusion  Recent Flowsheet Documentation  Taken 5/1/2021 1000 by Komal Weiss RN  Activity Management: up ad dav  Taken 5/1/2021 0730 by Komal Weiss RN  Activity Management: activity adjusted per tolerance     Problem: Diabetes Comorbidity  Goal: Blood Glucose Level Within Desired Range  Outcome: Met  Intervention: Maintain Glycemic Control  Recent Flowsheet Documentation  Taken 5/1/2021 0730 by Komal Weiss RN  Glycemic Management: blood glucose monitoring     Problem: Electrolyte Imbalance (Acute Kidney Injury/Impairment)  Goal: Serum Electrolyte Balance  Outcome: Met     Problem: Fluid Imbalance (Acute  Kidney Injury/Impairment)  Goal: Optimal Fluid Balance  Outcome: Met     Problem: Hematologic Alteration (Acute Kidney Injury/Impairment)  Goal: Hemoglobin, Hematocrit and Platelets Within Normal Range  Outcome: Met  Intervention: Monitor and Manage Signs of Anemia and Bleeding  Recent Flowsheet Documentation  Taken 5/1/2021 1000 by Komal Weiss RN  Bleeding Precautions: blood pressure closely monitored  Taken 5/1/2021 0730 by Komal Weiss RN  Bleeding Precautions: blood pressure closely monitored     Problem: Oral Intake Inadequate (Acute Kidney Injury/Impairment)  Goal: Optimal Nutrition Intake  Outcome: Met     Problem: Renal Function Impairment (Acute Kidney Injury/Impairment)  Goal: Effective Renal Function  Outcome: Met  Intervention: Monitor and Support Renal Function  Recent Flowsheet Documentation  Taken 5/1/2021 1000 by Komal Weiss RN  Medication Review/Management: medications reviewed  Taken 5/1/2021 0730 by Komal Weiss RN  Medication Review/Management: medications reviewed   Goal Outcome Evaluation:

## 2021-05-01 NOTE — PROGRESS NOTES
"   LOS: 7 days    Patient Care Team:  Nirmal Calvillo MD as PCP - General (Family Medicine)    Chief Complaint:  No chief complaint on file.    Follow up ERIKA on CKD3  Subjective     Interval History:     Had 850 mL urin output yesterday and 400 since midnight.    Review of Systems:   As noted above    Objective     Vital Signs  Temp:  [96.8 °F (36 °C)-97.9 °F (36.6 °C)] 96.8 °F (36 °C)  Heart Rate:  [61-85] 70  Resp:  [16-22] 18  BP: (104-135)/(60-72) 104/60    Flowsheet Rows      First Filed Value   Admission Height  157.5 cm (62\") Documented at 04/24/2021 0200   Admission Weight  124 kg (272 lb 7.8 oz) Documented at 04/24/2021 0200          I/O this shift:  In: 440 [P.O.:440]  Out: -   I/O last 3 completed shifts:  In: 360 [P.O.:360]  Out: 1250 [Urine:1250]    Intake/Output Summary (Last 24 hours) at 5/1/2021 0923  Last data filed at 5/1/2021 0800  Gross per 24 hour   Intake 800 ml   Output 400 ml   Net 400 ml       Physical Exam:  General Appearance: alert, oriented x 3, NAD, pale; chronically ill, obese.     Skin: warm and dry  HEENT: pupils round and reactive to light, oral mucosa normal, nonicteric sclera  Neck: supple, no JVD, trachea midline  Lungs: Clear to auscultation, no wheezing. Unlabored on room air  Heart: RRR, normal S1 and S2, no S3, no rub  Abdomen: soft, nontender, obese  : purwik  Extremities: no edema.   Neuro: normal speech and mental status       Results Review:    Results from last 7 days   Lab Units 05/01/21  0330 04/30/21  0420 04/29/21  0350   SODIUM mmol/L 133* 131* 132*   POTASSIUM mmol/L 4.4 4.5 4.7   CHLORIDE mmol/L 97* 98 98   CO2 mmol/L 20.6* 21.1* 20.0*   BUN mg/dL 56* 54* 46*   CREATININE mg/dL 2.18* 2.22* 2.23*   CALCIUM mg/dL 9.4 9.9 9.6   GLUCOSE mg/dL 168* 259* 266*       Estimated Creatinine Clearance: 33 mL/min (A) (by C-G formula based on SCr of 2.18 mg/dL (H)).    Results from last 7 days   Lab Units 05/01/21  0330 04/30/21  0420 04/29/21  0350   MAGNESIUM mg/dL 1.6 "  --   --    PHOSPHORUS mg/dL 5.6* 4.5 4.4             Results from last 7 days   Lab Units 05/01/21  0330 04/30/21  1508 04/30/21  1507 04/30/21  0420 04/29/21  0350 04/28/21  0350 04/27/21  0328   WBC 10*3/mm3 9.54  --   --  9.47 8.83 8.15 8.22   HEMOGLOBIN g/dL 10.0*  --   --  10.1* 9.4* 9.3* 9.1*   HEMOGLOBIN, POC g/dL  --  14.6 13.9  --   --   --   --    PLATELETS 10*3/mm3 321  --   --  351 325 305 323               Imaging Results (Last 24 Hours)     ** No results found for the last 24 hours. **        amLODIPine, 10 mg, Oral, Q24H  aspirin, 81 mg, Oral, Daily  atorvastatin, 40 mg, Oral, Daily  clopidogrel, 75 mg, Oral, Daily  DULoxetine, 20 mg, Oral, Daily  ferrous sulfate, 325 mg, Oral, Daily With Breakfast  gabapentin, 400 mg, Oral, Q8H  insulin glargine, 40 Units, Subcutaneous, Q12H  insulin lispro, 0-24 Units, Subcutaneous, TID AC  ipratropium-albuterol, 3 mL, Nebulization, Q6H - RT  isosorbide mononitrate, 60 mg, Oral, Q24H  metoprolol tartrate, 25 mg, Oral, Q12H  nicotine, 1 patch, Transdermal, Q24H  oxybutynin XL, 5 mg, Oral, Daily  pantoprazole, 40 mg, Oral, Nightly  senna-docusate sodium, 2 tablet, Oral, BID  sodium chloride, 10 mL, Intravenous, Q12H  sucralfate, 1 g, Oral, 4x Daily           Medication Review:   Current Facility-Administered Medications   Medication Dose Route Frequency Provider Last Rate Last Admin   • acetaminophen (TYLENOL) tablet 650 mg  650 mg Oral Q6H PRN Russell Voss MD   650 mg at 04/28/21 1558   • amLODIPine (NORVASC) tablet 10 mg  10 mg Oral Q24H Russell Voss MD   10 mg at 05/01/21 0848   • aspirin EC tablet 81 mg  81 mg Oral Daily Russell Voss MD   81 mg at 05/01/21 0848   • atorvastatin (LIPITOR) tablet 40 mg  40 mg Oral Daily Russell Voss MD   40 mg at 05/01/21 0855   • sennosides-docusate (PERICOLACE) 8.6-50 MG per tablet 2 tablet  2 tablet Oral BID Russell Voss MD   2 tablet at 04/30/21 2807    And   • polyethylene  glycol (MIRALAX) packet 17 g  17 g Oral Daily PRN Russell Voss MD        And   • bisacodyl (DULCOLAX) EC tablet 5 mg  5 mg Oral Daily PRN Russell Voss MD        And   • bisacodyl (DULCOLAX) suppository 10 mg  10 mg Rectal Daily PRN Russell Voss MD       • calcium carbonate (TUMS) chewable tablet 500 mg (200 mg elemental)  2 tablet Oral BID PRN Russell Voss MD   2 tablet at 04/25/21 0150   • clopidogrel (PLAVIX) tablet 75 mg  75 mg Oral Daily Russell Voss MD   75 mg at 05/01/21 0849   • dextrose (D50W) 25 g/ 50mL Intravenous Solution 25 g  25 g Intravenous Q15 Min PRN Russell Voss MD       • dextrose (GLUTOSE) oral gel 15 g  15 g Oral Q15 Min PRN Russell Voss MD       • DULoxetine (CYMBALTA) DR capsule 20 mg  20 mg Oral Daily Russell Voss MD   20 mg at 05/01/21 0848   • ferrous sulfate tablet 325 mg  325 mg Oral Daily With Breakfast Russell Voss MD   325 mg at 05/01/21 0849   • gabapentin (NEURONTIN) capsule 400 mg  400 mg Oral Q8H Russell Voss MD   400 mg at 05/01/21 0654   • glucagon (human recombinant) (GLUCAGEN DIAGNOSTIC) injection 1 mg  1 mg Subcutaneous Q15 Min PRN Russell Voss MD       • hydrALAZINE (APRESOLINE) injection 10 mg  10 mg Intravenous Q6H PRN Russell Voss MD   10 mg at 04/25/21 0106   • insulin glargine (LANTUS, SEMGLEE) injection 40 Units  40 Units Subcutaneous Q12H Russell Voss MD   40 Units at 04/30/21 2210   • insulin lispro (ADMELOG) injection 0-24 Units  0-24 Units Subcutaneous TID AC Russell Voss MD   4 Units at 05/01/21 0654   • ipratropium-albuterol (DUO-NEB) nebulizer solution 3 mL  3 mL Nebulization Q6H - RT Russell oVss MD   3 mL at 04/30/21 2047   • ipratropium-albuterol (DUO-NEB) nebulizer solution 3 mL  3 mL Nebulization Q4H PRN Russell Voss MD       • isosorbide mononitrate (IMDUR) 24 hr tablet 60 mg  60 mg Oral Q24H Shanika  Russell ESPARZA MD   60 mg at 05/01/21 0849   • Lidocaine Viscous HCl (XYLOCAINE) 2 % mouth solution 5 mL  5 mL Mouth/Throat Q3H PRN Russell Voss MD   5 mL at 04/24/21 2322   • metoprolol tartrate (LOPRESSOR) tablet 25 mg  25 mg Oral Q12H Russell Voss MD   25 mg at 05/01/21 0848   • nicotine (NICODERM CQ) 14 MG/24HR patch 1 patch  1 patch Transdermal Q24H Russell Voss MD   1 patch at 05/01/21 0849   • ondansetron (ZOFRAN) injection 4 mg  4 mg Intravenous Q6H PRN Russell Voss MD   4 mg at 04/24/21 2116   • oxybutynin XL (DITROPAN-XL) 24 hr tablet 5 mg  5 mg Oral Daily Russell Voss MD   5 mg at 05/01/21 0849   • pantoprazole (PROTONIX) EC tablet 40 mg  40 mg Oral Nightly Russell Voss MD   40 mg at 04/30/21 2158   • sodium chloride 0.9 % flush 10 mL  10 mL Intravenous Q12H Russell Voss MD   10 mL at 04/30/21 2210   • sodium chloride 0.9 % flush 10 mL  10 mL Intravenous PRN Russell Voss MD       • sucralfate (CARAFATE) tablet 1 g  1 g Oral 4x Daily Russell Voss MD   1 g at 05/01/21 0654   • traMADol (ULTRAM) tablet 25 mg  25 mg Oral Q8H PRN Russell Voss MD   25 mg at 05/01/21 0854       Assessment/Plan   1. ERIKA on CKD3, stable.  Baseline 1.7-2.  Multifactorial including DM2, HTN, PAD, NSAID use. Cath 4/23. Waste products at plateau.  Volume status fine.  Electrolytes stable  2. Multi-vessel CAD.  Plan for PCI (left main into circumflex) today  3. DM2. Uncontrolled Hg A1C 11 %.  4. Iron deficiency anemia on oral iron.  5. COPD  6. PAD.   7. MIKE> Instructed RN to make sure patient wears CPAP at night .    Plan:  -Restart Jardiance tomorrow  -Note plans to discharge the patient later today, will need follow-up in 1 week with BMP.  If kidney function stable can restart lisinopril at that point  -Nephrology follow-up in 1 to 2 weeks after discharge patient will arrange with her local nephrologist      Shiloh Johnson,  MD  05/01/21  09:23 EDT

## 2021-05-03 ENCOUNTER — TRANSITIONAL CARE MANAGEMENT TELEPHONE ENCOUNTER (OUTPATIENT)
Dept: CALL CENTER | Facility: HOSPITAL | Age: 61
End: 2021-05-03

## 2021-05-03 NOTE — OUTREACH NOTE
Call Center TCM Note      Responses   [facility] patient discharged from?  [place]   Does the patient have one of the following disease processes/diagnoses(primary or secondary)?  Cardiothoracic surgery   TCM attempt successful?  No   Unsuccessful attempts  Attempt 1          Nan Rodriguez RN    5/3/2021, 16:25 EDT

## 2021-05-03 NOTE — CASE MANAGEMENT/SOCIAL WORK
Case Management Discharge Note      Final Note: Pt d/c home from [facility] on 5/1/2021 with [facility] scheduled to follow.  SS/CCP    Provided Post Acute Provider List?: N/A  N/A Provider List Comment: Current with [facility]  Provided Post Acute Provider Quality & Resource List?: N/A    Selected Continued Care - Discharged on 5/1/2021 Admission date: 4/24/2021 - Discharge disposition: Home or Self Care    Destination    No services have been selected for the patient.              Durable Medical Equipment    No services have been selected for the patient.              Dialysis/Infusion    No services have been selected for the patient.              Home Medical Care Coordination complete    Service Provider Selected Services Address Phone Fax Patient Preferred    [facility] [address] [address] [phone] --          Therapy    No services have been selected for the patient.              Community Resources    No services have been selected for the patient.                Selected Continued Care - Prior Encounters Includes selections from prior encounters from 1/24/2021 to 5/1/2021    Discharged on 4/23/2021 Admission date: 4/23/2021 - Discharge disposition: Short Term Hospital ([place] - External)    Destination     Service Provider Selected Services Address Phone Fax Patient Preferred    [facility] [address] [facility] [address] [phone] -- --                Discharged on 4/12/2021 Admission date: 3/31/2021 - Discharge disposition: Home-Health Care [facility]    Home Medical Care     Service Provider Selected Services Address Phone Fax Patient Preferred    [facility] [address] [facility] [address] [phone] --                         Final Discharge Disposition Code:  06 - home with home health care

## 2021-05-03 NOTE — OUTREACH NOTE
Call Center TCM Note      Responses   [facility] patient discharged from?  [place]   Does the patient have one of the following disease processes/diagnoses(primary or secondary)?  Cardiothoracic surgery   TCM attempt successful?  No   Unsuccessful attempts  Attempt 2          Nan Rodriguez RN    5/3/2021, 16:32 EDT

## 2021-05-04 ENCOUNTER — TELEPHONE (OUTPATIENT)
Dept: FAMILY MEDICINE CLINIC | Facility: CLINIC | Age: 61
End: 2021-05-04

## 2021-05-04 ENCOUNTER — TRANSITIONAL CARE MANAGEMENT TELEPHONE ENCOUNTER (OUTPATIENT)
Dept: CALL CENTER | Facility: HOSPITAL | Age: 61
End: 2021-05-04

## 2021-05-04 DIAGNOSIS — IMO0002 UNCONTROLLED TYPE 2 DIABETES MELLITUS WITH DIABETIC POLYNEUROPATHY, WITH LONG-TERM CURRENT USE OF INSULIN: ICD-10-CM

## 2021-05-04 LAB — GLUCOSE BLDC GLUCOMTR-MCNC: 92 MG/DL (ref 70–130)

## 2021-05-04 RX ORDER — GABAPENTIN 800 MG/1
800 TABLET ORAL 3 TIMES DAILY
Qty: 90 TABLET | Refills: 0 | Status: SHIPPED | OUTPATIENT
Start: 2021-05-04 | End: 2021-05-28 | Stop reason: SDUPTHER

## 2021-05-04 NOTE — TELEPHONE ENCOUNTER
PT CALLED REQUESTING A REFILL OF gabapentin (NEURONTIN) 800 MG tablet SENT TO [facility].     HER CALL BACK NUMBER [phone]

## 2021-05-04 NOTE — OUTREACH NOTE
Call Center TCM Note      Responses   [facility] patient discharged from?  [place]   Does the patient have one of the following disease processes/diagnoses(primary or secondary)?  Cardiothoracic surgery   TCM attempt successful?  No   Unsuccessful attempts  Attempt 3 [ATTEMPTED PATIENT'S AND SPOUSE'S PHONES WITH NO ANSWER. ]          Corrie Lowe LPN    5/4/2021, 08:10 EDT

## 2021-05-06 ENCOUNTER — TELEPHONE (OUTPATIENT)
Dept: FAMILY MEDICINE CLINIC | Facility: CLINIC | Age: 61
End: 2021-05-06

## 2021-05-06 NOTE — TELEPHONE ENCOUNTER
Called patient to advise [facility]  Has been trying to contact her to update her info in order to send out her insulin pump.     Spoke to patient and Provided # [phone] for her to call     Mone patterson

## 2021-05-10 ENCOUNTER — TELEPHONE (OUTPATIENT)
Dept: CARDIAC REHAB | Facility: HOSPITAL | Age: 61
End: 2021-05-10

## 2021-05-10 NOTE — TELEPHONE ENCOUNTER
Called pt and discussed attending cardiac rehab. Pt aware of program. She had OPH surgery in 2013, but never was able to attend secondary to ongoing wound healing issues at the time. Explained purpose and benefits of cardiac rehab and time frame of program. She lives in [place], [place]. Pt says there is a cardiac rehab program next to her cardiologist's office in [place]. She will make a f/u appt with her cardiologist and then plans on attending the program in [place]. Pt says she has not had a cigarette since 4/23.

## 2021-05-11 ENCOUNTER — READMISSION MANAGEMENT (OUTPATIENT)
Dept: CALL CENTER | Facility: HOSPITAL | Age: 61
End: 2021-05-11

## 2021-05-11 NOTE — OUTREACH NOTE
CT Surgery Week 2 Survey      Responses   [facility] patient discharged from?  [place]   Does the patient have one of the following disease processes/diagnoses(primary or secondary)?  Cardiothoracic surgery   Week 2 attempt successful?  No   Rescheduled  Revoked          Chelsy Weiss RN

## 2021-05-12 ENCOUNTER — OFFICE VISIT (OUTPATIENT)
Dept: FAMILY MEDICINE CLINIC | Facility: CLINIC | Age: 61
End: 2021-05-12

## 2021-05-12 VITALS — HEIGHT: 62 IN | BODY MASS INDEX: 48.58 KG/M2 | WEIGHT: 264 LBS

## 2021-05-12 DIAGNOSIS — R06.02 SHORTNESS OF BREATH: Primary | ICD-10-CM

## 2021-05-12 PROCEDURE — 99442 PR PHYS/QHP TELEPHONE EVALUATION 11-20 MIN: CPT | Performed by: STUDENT IN AN ORGANIZED HEALTH CARE EDUCATION/TRAINING PROGRAM

## 2021-05-13 ENCOUNTER — APPOINTMENT (OUTPATIENT)
Dept: GENERAL RADIOLOGY | Facility: HOSPITAL | Age: 61
End: 2021-05-13

## 2021-05-13 ENCOUNTER — HOSPITAL ENCOUNTER (INPATIENT)
Facility: HOSPITAL | Age: 61
LOS: 8 days | Discharge: HOME HEALTH | End: 2021-05-21
Attending: FAMILY MEDICINE | Admitting: FAMILY MEDICINE

## 2021-05-13 DIAGNOSIS — J96.01 ACUTE RESPIRATORY FAILURE WITH HYPOXIA AND HYPERCAPNIA (HCC): ICD-10-CM

## 2021-05-13 DIAGNOSIS — J96.12 CHRONIC HYPERCAPNIC RESPIRATORY FAILURE (HCC): ICD-10-CM

## 2021-05-13 DIAGNOSIS — N17.9 ACUTE RENAL FAILURE SUPERIMPOSED ON STAGE 4 CHRONIC KIDNEY DISEASE, UNSPECIFIED ACUTE RENAL FAILURE TYPE (HCC): ICD-10-CM

## 2021-05-13 DIAGNOSIS — N18.4 ACUTE RENAL FAILURE SUPERIMPOSED ON STAGE 4 CHRONIC KIDNEY DISEASE, UNSPECIFIED ACUTE RENAL FAILURE TYPE (HCC): ICD-10-CM

## 2021-05-13 DIAGNOSIS — Z78.9 IMPAIRED MOBILITY AND ADLS: ICD-10-CM

## 2021-05-13 DIAGNOSIS — K92.2 GASTROINTESTINAL HEMORRHAGE, UNSPECIFIED GASTROINTESTINAL HEMORRHAGE TYPE: Primary | ICD-10-CM

## 2021-05-13 DIAGNOSIS — Z74.09 IMPAIRED FUNCTIONAL MOBILITY, BALANCE, GAIT, AND ENDURANCE: ICD-10-CM

## 2021-05-13 DIAGNOSIS — J96.02 ACUTE RESPIRATORY FAILURE WITH HYPOXIA AND HYPERCAPNIA (HCC): ICD-10-CM

## 2021-05-13 DIAGNOSIS — R77.8 ELEVATED TROPONIN: ICD-10-CM

## 2021-05-13 DIAGNOSIS — R53.81 PHYSICAL DECONDITIONING: ICD-10-CM

## 2021-05-13 DIAGNOSIS — Z74.09 IMPAIRED MOBILITY AND ADLS: ICD-10-CM

## 2021-05-13 PROBLEM — J96.00 ACUTE RESPIRATORY FAILURE: Status: ACTIVE | Noted: 2021-05-13

## 2021-05-13 LAB
ABO GROUP BLD: NORMAL
ABO GROUP BLD: NORMAL
ALBUMIN SERPL-MCNC: 3.6 G/DL (ref 3.5–5.2)
ALBUMIN/GLOB SERPL: 1 G/DL
ALP SERPL-CCNC: 129 U/L (ref 39–117)
ALT SERPL W P-5'-P-CCNC: 12 U/L (ref 1–33)
ANION GAP SERPL CALCULATED.3IONS-SCNC: 10 MMOL/L (ref 5–15)
ARTERIAL PATENCY WRIST A: POSITIVE
AST SERPL-CCNC: 14 U/L (ref 1–32)
ATMOSPHERIC PRESS: 754 MMHG
BASE EXCESS BLDA CALC-SCNC: -3.6 MMOL/L (ref 0–2)
BASOPHILS # BLD AUTO: 0.05 10*3/MM3 (ref 0–0.2)
BASOPHILS NFR BLD AUTO: 0.7 % (ref 0–1.5)
BDY SITE: ABNORMAL
BILIRUB SERPL-MCNC: 0.3 MG/DL (ref 0–1.2)
BLD GP AB SCN SERPL QL: NEGATIVE
BUN SERPL-MCNC: 45 MG/DL (ref 8–23)
BUN/CREAT SERPL: 20 (ref 7–25)
CALCIUM SPEC-SCNC: 7.7 MG/DL (ref 8.6–10.5)
CHLORIDE SERPL-SCNC: 107 MMOL/L (ref 98–107)
CO2 SERPL-SCNC: 23 MMOL/L (ref 22–29)
CREAT SERPL-MCNC: 2.25 MG/DL (ref 0.57–1)
D-DIMER, QUANTITATIVE (MAD,POW, STR): 1347 NG/ML (FEU) (ref 0–470)
DEPRECATED RDW RBC AUTO: 53.6 FL (ref 37–54)
EOSINOPHIL # BLD AUTO: 0.2 10*3/MM3 (ref 0–0.4)
EOSINOPHIL NFR BLD AUTO: 2.8 % (ref 0.3–6.2)
ERYTHROCYTE [DISTWIDTH] IN BLOOD BY AUTOMATED COUNT: 16.6 % (ref 12.3–15.4)
FLUAV RNA RESP QL NAA+PROBE: NOT DETECTED
FLUBV RNA RESP QL NAA+PROBE: NOT DETECTED
GAS FLOW AIRWAY: 2.5 LPM
GFR SERPL CREATININE-BSD FRML MDRD: 22 ML/MIN/1.73
GLOBULIN UR ELPH-MCNC: 3.5 GM/DL
GLUCOSE BLDC GLUCOMTR-MCNC: 53 MG/DL (ref 70–130)
GLUCOSE BLDC GLUCOMTR-MCNC: 56 MG/DL (ref 70–130)
GLUCOSE BLDC GLUCOMTR-MCNC: 73 MG/DL (ref 70–130)
GLUCOSE SERPL-MCNC: 96 MG/DL (ref 65–99)
HCO3 BLDA-SCNC: 22.8 MMOL/L (ref 20–26)
HCT VFR BLD AUTO: 22.6 % (ref 34–46.6)
HGB BLD-MCNC: 6.8 G/DL (ref 12–15.9)
HOLD SPECIMEN: NORMAL
HOLD SPECIMEN: NORMAL
IMM GRANULOCYTES # BLD AUTO: 0.08 10*3/MM3 (ref 0–0.05)
IMM GRANULOCYTES NFR BLD AUTO: 1.1 % (ref 0–0.5)
LYMPHOCYTES # BLD AUTO: 1.73 10*3/MM3 (ref 0.7–3.1)
LYMPHOCYTES NFR BLD AUTO: 23.8 % (ref 19.6–45.3)
Lab: ABNORMAL
Lab: NORMAL
MCH RBC QN AUTO: 27.8 PG (ref 26.6–33)
MCHC RBC AUTO-ENTMCNC: 30.1 G/DL (ref 31.5–35.7)
MCV RBC AUTO: 92.2 FL (ref 79–97)
MODALITY: ABNORMAL
MONOCYTES # BLD AUTO: 0.39 10*3/MM3 (ref 0.1–0.9)
MONOCYTES NFR BLD AUTO: 5.4 % (ref 5–12)
NEUTROPHILS NFR BLD AUTO: 4.82 10*3/MM3 (ref 1.7–7)
NEUTROPHILS NFR BLD AUTO: 66.2 % (ref 42.7–76)
NRBC BLD AUTO-RTO: 0.4 /100 WBC (ref 0–0.2)
NT-PROBNP SERPL-MCNC: 4531 PG/ML (ref 0–900)
PCO2 BLDA: 46.8 MM HG (ref 35–45)
PH BLDA: 7.3 PH UNITS (ref 7.35–7.45)
PLATELET # BLD AUTO: 233 10*3/MM3 (ref 140–450)
PMV BLD AUTO: 9.1 FL (ref 6–12)
PO2 BLDA: 66.5 MM HG (ref 83–108)
POTASSIUM SERPL-SCNC: 5.1 MMOL/L (ref 3.5–5.2)
PROT SERPL-MCNC: 7.1 G/DL (ref 6–8.5)
RBC # BLD AUTO: 2.45 10*6/MM3 (ref 3.77–5.28)
RH BLD: POSITIVE
RH BLD: POSITIVE
SAO2 % BLDCOA: 92.1 % (ref 94–99)
SARS-COV-2 RNA RESP QL NAA+PROBE: NOT DETECTED
SODIUM SERPL-SCNC: 140 MMOL/L (ref 136–145)
T&S EXPIRATION DATE: NORMAL
TROPONIN T SERPL-MCNC: 0.03 NG/ML (ref 0–0.03)
VENTILATOR MODE: ABNORMAL
WBC # BLD AUTO: 7.27 10*3/MM3 (ref 3.4–10.8)
WHOLE BLOOD HOLD SPECIMEN: NORMAL
WHOLE BLOOD HOLD SPECIMEN: NORMAL

## 2021-05-13 PROCEDURE — 93005 ELECTROCARDIOGRAM TRACING: CPT | Performed by: FAMILY MEDICINE

## 2021-05-13 PROCEDURE — P9016 RBC LEUKOCYTES REDUCED: HCPCS

## 2021-05-13 PROCEDURE — 71045 X-RAY EXAM CHEST 1 VIEW: CPT

## 2021-05-13 PROCEDURE — 86900 BLOOD TYPING SEROLOGIC ABO: CPT | Performed by: FAMILY MEDICINE

## 2021-05-13 PROCEDURE — G0378 HOSPITAL OBSERVATION PER HR: HCPCS

## 2021-05-13 PROCEDURE — 86901 BLOOD TYPING SEROLOGIC RH(D): CPT | Performed by: FAMILY MEDICINE

## 2021-05-13 PROCEDURE — 86901 BLOOD TYPING SEROLOGIC RH(D): CPT

## 2021-05-13 PROCEDURE — 82962 GLUCOSE BLOOD TEST: CPT

## 2021-05-13 PROCEDURE — 94799 UNLISTED PULMONARY SVC/PX: CPT

## 2021-05-13 PROCEDURE — 93010 ELECTROCARDIOGRAM REPORT: CPT | Performed by: INTERNAL MEDICINE

## 2021-05-13 PROCEDURE — 36600 WITHDRAWAL OF ARTERIAL BLOOD: CPT

## 2021-05-13 PROCEDURE — 86850 RBC ANTIBODY SCREEN: CPT | Performed by: FAMILY MEDICINE

## 2021-05-13 PROCEDURE — 99284 EMERGENCY DEPT VISIT MOD MDM: CPT

## 2021-05-13 PROCEDURE — 94760 N-INVAS EAR/PLS OXIMETRY 1: CPT

## 2021-05-13 PROCEDURE — 86923 COMPATIBILITY TEST ELECTRIC: CPT

## 2021-05-13 PROCEDURE — 83880 ASSAY OF NATRIURETIC PEPTIDE: CPT | Performed by: FAMILY MEDICINE

## 2021-05-13 PROCEDURE — 82803 BLOOD GASES ANY COMBINATION: CPT

## 2021-05-13 PROCEDURE — 85025 COMPLETE CBC W/AUTO DIFF WBC: CPT | Performed by: FAMILY MEDICINE

## 2021-05-13 PROCEDURE — 84484 ASSAY OF TROPONIN QUANT: CPT | Performed by: FAMILY MEDICINE

## 2021-05-13 PROCEDURE — 36430 TRANSFUSION BLD/BLD COMPNT: CPT

## 2021-05-13 PROCEDURE — 85379 FIBRIN DEGRADATION QUANT: CPT | Performed by: FAMILY MEDICINE

## 2021-05-13 PROCEDURE — 87636 SARSCOV2 & INF A&B AMP PRB: CPT | Performed by: FAMILY MEDICINE

## 2021-05-13 PROCEDURE — 99214 OFFICE O/P EST MOD 30 MIN: CPT | Performed by: INTERNAL MEDICINE

## 2021-05-13 PROCEDURE — 86900 BLOOD TYPING SEROLOGIC ABO: CPT

## 2021-05-13 PROCEDURE — 36415 COLL VENOUS BLD VENIPUNCTURE: CPT | Performed by: FAMILY MEDICINE

## 2021-05-13 PROCEDURE — 80053 COMPREHEN METABOLIC PANEL: CPT | Performed by: FAMILY MEDICINE

## 2021-05-13 RX ORDER — SODIUM CHLORIDE 0.9 % (FLUSH) 0.9 %
10 SYRINGE (ML) INJECTION AS NEEDED
Status: DISCONTINUED | OUTPATIENT
Start: 2021-05-13 | End: 2021-05-21 | Stop reason: HOSPADM

## 2021-05-13 RX ORDER — LANOLIN ALCOHOL/MO/W.PET/CERES
6 CREAM (GRAM) TOPICAL NIGHTLY PRN
Status: DISCONTINUED | OUTPATIENT
Start: 2021-05-13 | End: 2021-05-21 | Stop reason: HOSPADM

## 2021-05-13 RX ORDER — DULOXETIN HYDROCHLORIDE 20 MG/1
20 CAPSULE, DELAYED RELEASE ORAL DAILY
Status: DISCONTINUED | OUTPATIENT
Start: 2021-05-14 | End: 2021-05-21 | Stop reason: HOSPADM

## 2021-05-13 RX ORDER — METOPROLOL TARTRATE 50 MG/1
100 TABLET, FILM COATED ORAL EVERY 12 HOURS SCHEDULED
Status: DISCONTINUED | OUTPATIENT
Start: 2021-05-13 | End: 2021-05-21 | Stop reason: HOSPADM

## 2021-05-13 RX ORDER — POTASSIUM CHLORIDE 750 MG/1
10 CAPSULE, EXTENDED RELEASE ORAL DAILY
Status: DISCONTINUED | OUTPATIENT
Start: 2021-05-13 | End: 2021-05-13

## 2021-05-13 RX ORDER — ASPIRIN 81 MG/1
81 TABLET ORAL DAILY
Status: DISCONTINUED | OUTPATIENT
Start: 2021-05-14 | End: 2021-05-21 | Stop reason: HOSPADM

## 2021-05-13 RX ORDER — ATORVASTATIN CALCIUM 40 MG/1
40 TABLET, FILM COATED ORAL DAILY
Status: DISCONTINUED | OUTPATIENT
Start: 2021-05-13 | End: 2021-05-21 | Stop reason: HOSPADM

## 2021-05-13 RX ORDER — POLYETHYLENE GLYCOL 3350 17 G/17G
1 POWDER, FOR SOLUTION ORAL ONCE
Status: COMPLETED | OUTPATIENT
Start: 2021-05-13 | End: 2021-05-13

## 2021-05-13 RX ORDER — PANTOPRAZOLE SODIUM 40 MG/10ML
80 INJECTION, POWDER, LYOPHILIZED, FOR SOLUTION INTRAVENOUS
Status: DISCONTINUED | OUTPATIENT
Start: 2021-05-14 | End: 2021-05-13 | Stop reason: DRUGHIGH

## 2021-05-13 RX ORDER — CYCLOBENZAPRINE HCL 10 MG
10 TABLET ORAL 2 TIMES DAILY PRN
Status: DISCONTINUED | OUTPATIENT
Start: 2021-05-13 | End: 2021-05-21 | Stop reason: HOSPADM

## 2021-05-13 RX ORDER — GABAPENTIN 400 MG/1
800 CAPSULE ORAL EVERY 8 HOURS SCHEDULED
Status: DISCONTINUED | OUTPATIENT
Start: 2021-05-13 | End: 2021-05-21 | Stop reason: HOSPADM

## 2021-05-13 RX ORDER — HYDROCODONE BITARTRATE AND ACETAMINOPHEN 5; 325 MG/1; MG/1
1 TABLET ORAL EVERY 4 HOURS PRN
Status: DISPENSED | OUTPATIENT
Start: 2021-05-13 | End: 2021-05-20

## 2021-05-13 RX ORDER — BISACODYL 10 MG
10 SUPPOSITORY, RECTAL RECTAL DAILY PRN
Status: DISCONTINUED | OUTPATIENT
Start: 2021-05-13 | End: 2021-05-21 | Stop reason: HOSPADM

## 2021-05-13 RX ORDER — NICOTINE POLACRILEX 4 MG
15 LOZENGE BUCCAL
Status: DISCONTINUED | OUTPATIENT
Start: 2021-05-13 | End: 2021-05-21 | Stop reason: HOSPADM

## 2021-05-13 RX ORDER — CLOPIDOGREL BISULFATE 75 MG/1
75 TABLET ORAL DAILY
Status: DISCONTINUED | OUTPATIENT
Start: 2021-05-14 | End: 2021-05-21 | Stop reason: HOSPADM

## 2021-05-13 RX ORDER — DEXTROSE MONOHYDRATE 25 G/50ML
25 INJECTION, SOLUTION INTRAVENOUS
Status: DISCONTINUED | OUTPATIENT
Start: 2021-05-13 | End: 2021-05-21 | Stop reason: HOSPADM

## 2021-05-13 RX ORDER — HEPARIN SODIUM 5000 [USP'U]/ML
5000 INJECTION, SOLUTION INTRAVENOUS; SUBCUTANEOUS EVERY 12 HOURS SCHEDULED
Status: DISCONTINUED | OUTPATIENT
Start: 2021-05-13 | End: 2021-05-13

## 2021-05-13 RX ORDER — ONDANSETRON 4 MG/1
4 TABLET, FILM COATED ORAL EVERY 6 HOURS PRN
Status: DISCONTINUED | OUTPATIENT
Start: 2021-05-13 | End: 2021-05-21 | Stop reason: HOSPADM

## 2021-05-13 RX ORDER — ALBUTEROL SULFATE 2.5 MG/3ML
2.5 SOLUTION RESPIRATORY (INHALATION) EVERY 4 HOURS PRN
Status: DISCONTINUED | OUTPATIENT
Start: 2021-05-13 | End: 2021-05-21 | Stop reason: HOSPADM

## 2021-05-13 RX ORDER — PANTOPRAZOLE SODIUM 40 MG/10ML
40 INJECTION, POWDER, LYOPHILIZED, FOR SOLUTION INTRAVENOUS ONCE
Status: COMPLETED | OUTPATIENT
Start: 2021-05-13 | End: 2021-05-13

## 2021-05-13 RX ORDER — NALOXONE HCL 0.4 MG/ML
0.4 VIAL (ML) INJECTION
Status: DISCONTINUED | OUTPATIENT
Start: 2021-05-13 | End: 2021-05-21 | Stop reason: HOSPADM

## 2021-05-13 RX ORDER — SUCRALFATE 1 G/1
1 TABLET ORAL 4 TIMES DAILY
Status: DISCONTINUED | OUTPATIENT
Start: 2021-05-13 | End: 2021-05-21 | Stop reason: HOSPADM

## 2021-05-13 RX ORDER — NYSTATIN 100000 [USP'U]/G
POWDER TOPICAL 3 TIMES DAILY
Status: DISCONTINUED | OUTPATIENT
Start: 2021-05-13 | End: 2021-05-21 | Stop reason: HOSPADM

## 2021-05-13 RX ORDER — CALCIUM CARBONATE 200(500)MG
2 TABLET,CHEWABLE ORAL 2 TIMES DAILY PRN
Status: DISCONTINUED | OUTPATIENT
Start: 2021-05-13 | End: 2021-05-21 | Stop reason: HOSPADM

## 2021-05-13 RX ORDER — SODIUM CHLORIDE 0.9 % (FLUSH) 0.9 %
10 SYRINGE (ML) INJECTION EVERY 12 HOURS SCHEDULED
Status: DISCONTINUED | OUTPATIENT
Start: 2021-05-13 | End: 2021-05-21 | Stop reason: HOSPADM

## 2021-05-13 RX ORDER — AMOXICILLIN 250 MG
2 CAPSULE ORAL 2 TIMES DAILY
Status: DISCONTINUED | OUTPATIENT
Start: 2021-05-13 | End: 2021-05-21 | Stop reason: HOSPADM

## 2021-05-13 RX ORDER — OXYBUTYNIN CHLORIDE 5 MG/1
5 TABLET, EXTENDED RELEASE ORAL DAILY
Status: DISCONTINUED | OUTPATIENT
Start: 2021-05-14 | End: 2021-05-21 | Stop reason: HOSPADM

## 2021-05-13 RX ORDER — LISINOPRIL 10 MG/1
10 TABLET ORAL DAILY
Status: DISCONTINUED | OUTPATIENT
Start: 2021-05-14 | End: 2021-05-21 | Stop reason: HOSPADM

## 2021-05-13 RX ORDER — PANTOPRAZOLE SODIUM 40 MG/10ML
40 INJECTION, POWDER, LYOPHILIZED, FOR SOLUTION INTRAVENOUS EVERY 12 HOURS SCHEDULED
Status: DISCONTINUED | OUTPATIENT
Start: 2021-05-14 | End: 2021-05-21 | Stop reason: HOSPADM

## 2021-05-13 RX ORDER — SODIUM BICARBONATE 650 MG/1
650 TABLET ORAL 2 TIMES DAILY
Status: DISCONTINUED | OUTPATIENT
Start: 2021-05-13 | End: 2021-05-21 | Stop reason: HOSPADM

## 2021-05-13 RX ORDER — POLYETHYLENE GLYCOL 3350 17 G/17G
17 POWDER, FOR SOLUTION ORAL DAILY PRN
Status: DISCONTINUED | OUTPATIENT
Start: 2021-05-13 | End: 2021-05-21 | Stop reason: HOSPADM

## 2021-05-13 RX ORDER — BISACODYL 5 MG/1
5 TABLET, DELAYED RELEASE ORAL DAILY PRN
Status: DISCONTINUED | OUTPATIENT
Start: 2021-05-13 | End: 2021-05-21 | Stop reason: HOSPADM

## 2021-05-13 RX ORDER — PROMETHAZINE HYDROCHLORIDE 25 MG/1
25 TABLET ORAL EVERY 6 HOURS PRN
Status: DISCONTINUED | OUTPATIENT
Start: 2021-05-13 | End: 2021-05-21 | Stop reason: HOSPADM

## 2021-05-13 RX ORDER — PANTOPRAZOLE SODIUM 40 MG/1
40 TABLET, DELAYED RELEASE ORAL NIGHTLY
Status: CANCELLED | OUTPATIENT
Start: 2021-05-13

## 2021-05-13 RX ORDER — SODIUM CHLORIDE 9 MG/ML
INJECTION, SOLUTION INTRAVENOUS
Status: COMPLETED
Start: 2021-05-13 | End: 2021-05-14

## 2021-05-13 RX ORDER — ACETAMINOPHEN 325 MG/1
650 TABLET ORAL EVERY 4 HOURS PRN
Status: DISCONTINUED | OUTPATIENT
Start: 2021-05-13 | End: 2021-05-21 | Stop reason: HOSPADM

## 2021-05-13 RX ORDER — LIDOCAINE 50 MG/G
1 PATCH TOPICAL DAILY
Status: DISCONTINUED | OUTPATIENT
Start: 2021-05-13 | End: 2021-05-21 | Stop reason: HOSPADM

## 2021-05-13 RX ORDER — DEXTROSE AND SODIUM CHLORIDE 5; .45 G/100ML; G/100ML
30 INJECTION, SOLUTION INTRAVENOUS CONTINUOUS PRN
Status: CANCELLED | OUTPATIENT
Start: 2021-05-13

## 2021-05-13 RX ORDER — POTASSIUM CHLORIDE 750 MG/1
10 CAPSULE, EXTENDED RELEASE ORAL DAILY
Status: DISCONTINUED | OUTPATIENT
Start: 2021-05-14 | End: 2021-05-21 | Stop reason: HOSPADM

## 2021-05-13 RX ADMIN — SODIUM BICARBONATE 650 MG: 650 TABLET ORAL at 22:10

## 2021-05-13 RX ADMIN — NYSTATIN: 100000 POWDER TOPICAL at 22:14

## 2021-05-13 RX ADMIN — NYSTATIN: 100000 POWDER TOPICAL at 18:42

## 2021-05-13 RX ADMIN — PANTOPRAZOLE SODIUM 40 MG: 40 INJECTION, POWDER, FOR SOLUTION INTRAVENOUS at 13:53

## 2021-05-13 RX ADMIN — GABAPENTIN 800 MG: 400 CAPSULE ORAL at 22:10

## 2021-05-13 RX ADMIN — DEXTROSE MONOHYDRATE 25 G: 500 INJECTION PARENTERAL at 17:29

## 2021-05-13 RX ADMIN — PANTOPRAZOLE SODIUM 40 MG: 40 INJECTION, POWDER, FOR SOLUTION INTRAVENOUS at 17:48

## 2021-05-13 RX ADMIN — DOCUSATE SODIUM 50 MG AND SENNOSIDES 8.6 MG 2 TABLET: 8.6; 5 TABLET, FILM COATED ORAL at 22:10

## 2021-05-13 RX ADMIN — POLYETHYLENE GLYCOL 3350 1 BOTTLE: 17 POWDER, FOR SOLUTION ORAL at 18:42

## 2021-05-13 RX ADMIN — METOPROLOL TARTRATE 100 MG: 50 TABLET, FILM COATED ORAL at 22:09

## 2021-05-13 NOTE — PROGRESS NOTES
Family Medicine Residency  Alejandra Altamirano MD    Subjective:         You have chosen to receive care through a telephone visit. Do you consent to use a telephone visit for your medical care today? Yes    Yamileth Slater is a 61 y.o. female who presents for shortness of air.  Recently discharged on 5/1 from [facility] due to chest pain and had a stent placed.  Reports she had been doing well until last night when she began having difficulty breathing.  She checked her oxygen levels and they dropped to 77% on ambulation.  She placed herself on 2.5 L of oxygen and has been maintaining sats in the low 90s while resting.  She desaturates on minor exertion and is on room air at baseline.  Denies chest pain, palpitations, cough, leg edema.  Did report that she felt febrile earlier in the day but did not check her temperature and no longer feels febrile.    The following portions of the patient's history were reviewed and updated as appropriate: allergies, current medications, past family history, past medical history, past social history, past surgical history and problem list.    Past Medical Hx:  Past Medical History:   Diagnosis Date   • Anxiety    • Carotid artery stenosis    • Chronic obstructive lung disease (CMS/HCC)    • CKD (chronic kidney disease) stage 4, GFR 15-29 ml/min (CMS/HCC)    • Colonic polyp    • Coronary arteriosclerosis    • Diabetes mellitus (CMS/HCC)    • Diabetic neuropathy (CMS/HCC)    • GERD (gastroesophageal reflux disease)    • Hypercholesterolemia    • Hypertension    • Morbid obesity (CMS/HCC)    • Nephrolithiasis    • Peripheral vascular disease (CMS/HCC)    • Sleep apnea    • Substance abuse (CMS/HCC)    • Vitamin D deficiency        Past Surgical Hx:  Past Surgical History:   Procedure Laterality Date   • CARDIAC CATHETERIZATION N/A 7/14/2020   • CARDIAC CATHETERIZATION N/A 4/23/2021    Procedure: Left Heart Cath;  Surgeon: Melba Romo MD;  Location: [facility]  CATH INVASIVE LOCATION;  Service: Cardiology;  Laterality: N/A;   • CARDIAC CATHETERIZATION N/A 4/30/2021    Procedure: Percutaneous Coronary Intervention;  Surgeon: Russell Voss MD;  Location: [facility] CATH INVASIVE LOCATION;  Service: Cardiovascular;  Laterality: N/A;   • CARDIAC CATHETERIZATION N/A 4/30/2021    Procedure: Stent NIKKI coronary;  Surgeon: Russell Voss MD;  Location: [facility] CATH INVASIVE LOCATION;  Service: Cardiovascular;  Laterality: N/A;   • CAROTID STENT Left    • COLONOSCOPY     • CORONARY ARTERY BYPASS GRAFT     • CYSTOSCOPY BLADDER STONE LITHOTRIPSY Bilateral    • ENDOSCOPY N/A 4/12/2021    Procedure: ESOPHAGOGASTRODUODENOSCOPY;  Surgeon: Mingo Duarte MD;  Location: [facility] ENDOSCOPY;  Service: Gastroenterology;  Laterality: N/A;       Current Meds:  No current facility-administered medications for this visit.  No current outpatient medications on file.    Facility-Administered Medications Ordered in Other Visits:   •  acetaminophen (TYLENOL) tablet 650 mg, 650 mg, Oral, Q4H PRN, Eduin Griffin MD  •  albuterol (PROVENTIL) nebulizer solution 0.083% 2.5 mg/3mL, 2.5 mg, Nebulization, Q4H PRN, Eduin Griffin MD  •  [START ON 5/14/2021] aspirin EC tablet 81 mg, 81 mg, Oral, Daily, Eduin Griffin MD  •  atorvastatin (LIPITOR) tablet 40 mg, 40 mg, Oral, Daily, Eduin Griffin MD  •  sennosides-docusate (PERICOLACE) 8.6-50 MG per tablet 2 tablet, 2 tablet, Oral, BID **AND** polyethylene glycol (MIRALAX) packet 17 g, 17 g, Oral, Daily PRN **AND** bisacodyl (DULCOLAX) EC tablet 5 mg, 5 mg, Oral, Daily PRN **AND** bisacodyl (DULCOLAX) suppository 10 mg, 10 mg, Rectal, Daily PRN, Eduin Griffin MD  •  calcium carbonate (TUMS) chewable tablet 500 mg (200 mg elemental), 2 tablet, Oral, BID PRN, Eduin Griffin MD  •  [START ON 5/14/2021] clopidogrel (PLAVIX) tablet 75 mg, 75 mg, Oral, Daily, Eduin Griffin MD  •  cyclobenzaprine (FLEXERIL) tablet 10 mg, 10 mg, Oral, BID PRN, Eduin Griffin MD  •  dextrose  (D50W) 25 g/ 50mL Intravenous Solution 25 g, 25 g, Intravenous, Q15 Min PRN, Eduin Griffin MD, 25 g at 05/13/21 1729  •  dextrose (GLUTOSE) oral gel 15 g, 15 g, Oral, Q15 Min PRN, Eduin Griffin MD  •  [START ON 5/14/2021] DULoxetine (CYMBALTA) DR capsule 20 mg, 20 mg, Oral, Daily, Eduin Griffin MD  •  gabapentin (NEURONTIN) capsule 800 mg, 800 mg, Oral, Q8H, Eduin Griffin MD  •  glucagon (human recombinant) (GLUCAGEN DIAGNOSTIC) injection 1 mg, 1 mg, Subcutaneous, Q15 Min PRN, Eduin Griffin MD  •  HYDROcodone-acetaminophen (NORCO) 5-325 MG per tablet 1 tablet, 1 tablet, Oral, Q4H PRN, Eduin Griffin MD  •  HYDROmorphone (DILAUDID) injection 0.5 mg, 0.5 mg, Intravenous, Q2H PRN **AND** naloxone (NARCAN) injection 0.4 mg, 0.4 mg, Intravenous, Q5 Min PRN, Eduin Griffin MD  •  insulin aspart (novoLOG) injection 0-14 Units, 0-14 Units, Subcutaneous, TID AC, Eduin Griffin MD  •  insulin detemir (LEVEMIR) injection 30 Units, 30 Units, Subcutaneous, Q12H, Eduin Griffin MD  •  lidocaine (LIDODERM) 5 % 1 patch, 1 patch, Transdermal, Daily, Eduin Griffin MD  •  [START ON 5/14/2021] lisinopril (PRINIVIL,ZESTRIL) tablet 10 mg, 10 mg, Oral, Daily, Eduin Griffin MD  •  melatonin tablet 6 mg, 6 mg, Oral, Nightly PRN, Eduin Griffin MD  •  metoprolol tartrate (LOPRESSOR) tablet 100 mg, 100 mg, Oral, Q12H, Eduin Griffin MD  •  nystatin (MYCOSTATIN) powder, , Topical, TID, Eduin Griffin MD  •  ondansetron (ZOFRAN) tablet 4 mg, 4 mg, Oral, Q6H PRN, Eduin Griffin MD  •  [START ON 5/14/2021] oxybutynin XL (DITROPAN-XL) 24 hr tablet 5 mg, 5 mg, Oral, Daily, Eduin Griffin MD  •  pantoprazole (PROTONIX) injection 40 mg, 40 mg, Intravenous, Once, Eduin Griffin MD  •  [START ON 5/14/2021] pantoprazole (PROTONIX) injection 80 mg, 80 mg, Intravenous, Q AM, Eduin Griffin MD  •  polyethylene glycol (MIRALAX) powder 1 bottle, 1 bottle, Oral, Once, Mingo Duarte MD  •  [START ON 5/14/2021] potassium chloride (MICRO-K) CR capsule 10 mEq, 10 mEq, Oral, Daily, Kit Brar,  MD  •  promethazine (PHENERGAN) tablet 25 mg, 25 mg, Oral, Q6H PRN, Eduin Griffin MD  •  sodium bicarbonate tablet 650 mg, 650 mg, Oral, BID, Eduin Griffin MD  •  sodium chloride 0.9 % flush 10 mL, 10 mL, Intravenous, PRN, Eduin Griffin MD  •  sodium chloride 0.9 % flush 10 mL, 10 mL, Intravenous, Q12H, Eduin Griffin MD  •  sodium chloride 0.9 % flush 10 mL, 10 mL, Intravenous, PRN, Eduin Griffin MD  •  sodium chloride 0.9 % infusion  - ADS Override Pull, , , ,   •  sucralfate (CARAFATE) tablet 1 g, 1 g, Oral, 4x Daily, Eduin Griffin MD    Allergies:  Allergies   Allergen Reactions   • Adhesive Tape Hives   • Other      Bandaids, MRSA, SURECLOSE       Family Hx:  Family History   Problem Relation Age of Onset   • Heart disease Mother    • Heart disease Father    • Heart disease Sister    • Heart disease Brother         Social History:  Social History     Socioeconomic History   • Marital status:      Spouse name: wesley dumont   • Number of children: Not on file   • Years of education: Not on file   • Highest education level: Not on file   Tobacco Use   • Smoking status: Light Tobacco Smoker     Packs/day: 0.25     Years: 46.00     Pack years: 11.50     Types: Cigarettes   • Smokeless tobacco: Never Used   • Tobacco comment: only smoking 5 a day    Substance and Sexual Activity   • Alcohol use: No   • Drug use: Not Currently     Types: LSD, Marijuana, Methamphetamines   • Sexual activity: Not Currently       Review of Systems  Review of Systems   Constitutional: Positive for fatigue. Negative for activity change, appetite change, chills and fever.   HENT: Negative for congestion, rhinorrhea, sinus pain and sore throat.    Eyes: Negative for pain, redness and visual disturbance.   Respiratory: Positive for shortness of breath. Negative for cough and wheezing.    Cardiovascular: Negative for chest pain, palpitations and leg swelling.   Gastrointestinal: Negative for abdominal pain, constipation, diarrhea, nausea and  "vomiting.   Genitourinary: Negative for dysuria and flank pain.   Musculoskeletal: Negative for arthralgias, back pain, joint swelling and myalgias.   Skin: Negative for color change, pallor and rash.   Neurological: Negative for dizziness, light-headedness and headaches.       Objective:     Ht 157.5 cm (62\")   Wt 120 kg (264 lb)   LMP  (LMP Unknown)   BMI 48.29 kg/m²   No physical exam due to this being a telephone visit.     Assessment/Plan:     Diagnoses and all orders for this visit:    1. Shortness of breath (Primary)    Patient having significant desaturations on minor exertion to 77% on room air.  Stable at 2.5 L oxygen. Also had a possible fever earlier today. Discharged from [facility] on 5/1 after being admitted for 2 weeks due to chest pain and stent placement. Advised patient to present to ER for further evaluation.  Patient agreeable to plan.    · Rx changes: none  · Patient Education: see a/p  · Compliance at present is estimated to be good.     Depression screening: Up to date; last screen 3/26/2021     Follow-up:     Return in about 4 weeks (around 6/9/2021) for Recheck.    Preventative:  Health Maintenance   Topic Date Due   • COVID-19 Vaccine (1) Never done   • ZOSTER VACCINE (1 of 2) Never done   • ANNUAL WELLNESS VISIT  Never done   • DIABETIC EYE EXAM  Never done   • URINE MICROALBUMIN  10/05/2018   • DIABETIC FOOT EXAM  12/26/2019   • PAP SMEAR  01/04/2020   • COLORECTAL CANCER SCREENING  05/02/2021   • LIPID PANEL  06/24/2021   • INFLUENZA VACCINE  08/01/2021   • HEMOGLOBIN A1C  10/24/2021   • MAMMOGRAM  01/10/2022   • TDAP/TD VACCINES (3 - Td) 11/10/2024   • HEPATITIS C SCREENING  Completed   • Pneumococcal Vaccine 0-64  Completed     Female Preventative: PAP is due  Colon cancer screening is due  Recommended: Varicella  Vaccine Counseling: N/A    Weight  -Class: Obese Class III extreme obesity: > or equal to 40kg/m2  -Patient's Body mass index is 48.29 kg/m². indicating " that she is morbidly obese (BMI > 40 or > 35 with obesity - related health condition). Obesity-related health conditions include the following: hypertension, coronary heart disease, diabetes mellitus and dyslipidemias.   eat more fruits and vegetables, decrease soda or juice intake, increase water intake, increase physical activity, reduce screen time, reduce portion size, cut out extra servings and reduce fast food intake    Alcohol use:  reports no history of alcohol use.  Nicotine status  reports that she has been smoking cigarettes. She has a 11.50 pack-year smoking history. She has never used smokeless tobacco.    Goals     • Fasting Blood Glucose       Barriers: compliance with diet      • Quit smoking / using tobacco         A total of 18 minutes spent in discussion and care of this patient.    RISK SCORE: 3      Alejandra Altamirano M.D. PGY2  [facility]  [address] [place] [address]  Office: [phone]  This document has been electronically signed by Alejandra Altamirano MD on May 13, 2021 17:38 CDT

## 2021-05-14 ENCOUNTER — ANESTHESIA (OUTPATIENT)
Dept: GASTROENTEROLOGY | Facility: HOSPITAL | Age: 61
End: 2021-05-14

## 2021-05-14 ENCOUNTER — APPOINTMENT (OUTPATIENT)
Dept: GASTROENTEROLOGY | Facility: HOSPITAL | Age: 61
End: 2021-05-14

## 2021-05-14 ENCOUNTER — ANESTHESIA EVENT (OUTPATIENT)
Dept: GASTROENTEROLOGY | Facility: HOSPITAL | Age: 61
End: 2021-05-14

## 2021-05-14 LAB
ALBUMIN SERPL-MCNC: 3.9 G/DL (ref 3.5–5.2)
ALBUMIN/GLOB SERPL: 1.1 G/DL
ALP SERPL-CCNC: 135 U/L (ref 39–117)
ALT SERPL W P-5'-P-CCNC: 15 U/L (ref 1–33)
ANION GAP SERPL CALCULATED.3IONS-SCNC: 9 MMOL/L (ref 5–15)
AST SERPL-CCNC: 19 U/L (ref 1–32)
BASOPHILS # BLD AUTO: 0.08 10*3/MM3 (ref 0–0.2)
BASOPHILS NFR BLD AUTO: 0.8 % (ref 0–1.5)
BILIRUB SERPL-MCNC: 0.4 MG/DL (ref 0–1.2)
BUN SERPL-MCNC: 43 MG/DL (ref 8–23)
BUN/CREAT SERPL: 18.5 (ref 7–25)
CALCIUM SPEC-SCNC: 8 MG/DL (ref 8.6–10.5)
CHLORIDE SERPL-SCNC: 109 MMOL/L (ref 98–107)
CO2 SERPL-SCNC: 24 MMOL/L (ref 22–29)
CREAT SERPL-MCNC: 2.33 MG/DL (ref 0.57–1)
DEPRECATED RDW RBC AUTO: 51.8 FL (ref 37–54)
EOSINOPHIL # BLD AUTO: 0.14 10*3/MM3 (ref 0–0.4)
EOSINOPHIL NFR BLD AUTO: 1.5 % (ref 0.3–6.2)
ERYTHROCYTE [DISTWIDTH] IN BLOOD BY AUTOMATED COUNT: 16.3 % (ref 12.3–15.4)
GFR SERPL CREATININE-BSD FRML MDRD: 21 ML/MIN/1.73
GLOBULIN UR ELPH-MCNC: 3.7 GM/DL
GLUCOSE BLDC GLUCOMTR-MCNC: 102 MG/DL (ref 70–130)
GLUCOSE BLDC GLUCOMTR-MCNC: 109 MG/DL (ref 70–130)
GLUCOSE BLDC GLUCOMTR-MCNC: 111 MG/DL (ref 70–130)
GLUCOSE BLDC GLUCOMTR-MCNC: 44 MG/DL (ref 70–130)
GLUCOSE BLDC GLUCOMTR-MCNC: 53 MG/DL (ref 70–130)
GLUCOSE BLDC GLUCOMTR-MCNC: 64 MG/DL (ref 70–130)
GLUCOSE BLDC GLUCOMTR-MCNC: 82 MG/DL (ref 70–130)
GLUCOSE SERPL-MCNC: 42 MG/DL (ref 65–99)
HCT VFR BLD AUTO: 30.2 % (ref 34–46.6)
HGB BLD-MCNC: 9.2 G/DL (ref 12–15.9)
IMM GRANULOCYTES # BLD AUTO: 0.06 10*3/MM3 (ref 0–0.05)
IMM GRANULOCYTES NFR BLD AUTO: 0.6 % (ref 0–0.5)
LYMPHOCYTES # BLD AUTO: 2.32 10*3/MM3 (ref 0.7–3.1)
LYMPHOCYTES NFR BLD AUTO: 24.4 % (ref 19.6–45.3)
MCH RBC QN AUTO: 28 PG (ref 26.6–33)
MCHC RBC AUTO-ENTMCNC: 30.5 G/DL (ref 31.5–35.7)
MCV RBC AUTO: 92.1 FL (ref 79–97)
MONOCYTES # BLD AUTO: 0.72 10*3/MM3 (ref 0.1–0.9)
MONOCYTES NFR BLD AUTO: 7.6 % (ref 5–12)
NEUTROPHILS NFR BLD AUTO: 6.19 10*3/MM3 (ref 1.7–7)
NEUTROPHILS NFR BLD AUTO: 65.1 % (ref 42.7–76)
NRBC BLD AUTO-RTO: 0.4 /100 WBC (ref 0–0.2)
PLATELET # BLD AUTO: 246 10*3/MM3 (ref 140–450)
PMV BLD AUTO: 8.9 FL (ref 6–12)
POTASSIUM SERPL-SCNC: 5.5 MMOL/L (ref 3.5–5.2)
PROT SERPL-MCNC: 7.6 G/DL (ref 6–8.5)
RBC # BLD AUTO: 3.28 10*6/MM3 (ref 3.77–5.28)
SODIUM SERPL-SCNC: 142 MMOL/L (ref 136–145)
WBC # BLD AUTO: 9.51 10*3/MM3 (ref 3.4–10.8)

## 2021-05-14 PROCEDURE — 43239 EGD BIOPSY SINGLE/MULTIPLE: CPT | Performed by: INTERNAL MEDICINE

## 2021-05-14 PROCEDURE — 0DJD8ZZ INSPECTION OF LOWER INTESTINAL TRACT, VIA NATURAL OR ARTIFICIAL OPENING ENDOSCOPIC: ICD-10-PCS | Performed by: INTERNAL MEDICINE

## 2021-05-14 PROCEDURE — 0DJ07ZZ INSPECTION OF UPPER INTESTINAL TRACT, VIA NATURAL OR ARTIFICIAL OPENING: ICD-10-PCS | Performed by: INTERNAL MEDICINE

## 2021-05-14 PROCEDURE — 85025 COMPLETE CBC W/AUTO DIFF WBC: CPT | Performed by: STUDENT IN AN ORGANIZED HEALTH CARE EDUCATION/TRAINING PROGRAM

## 2021-05-14 PROCEDURE — 88305 TISSUE EXAM BY PATHOLOGIST: CPT

## 2021-05-14 PROCEDURE — 82962 GLUCOSE BLOOD TEST: CPT

## 2021-05-14 PROCEDURE — 45378 DIAGNOSTIC COLONOSCOPY: CPT | Performed by: INTERNAL MEDICINE

## 2021-05-14 PROCEDURE — 25010000002 PROPOFOL 10 MG/ML EMULSION: Performed by: NURSE ANESTHETIST, CERTIFIED REGISTERED

## 2021-05-14 PROCEDURE — 80053 COMPREHEN METABOLIC PANEL: CPT | Performed by: STUDENT IN AN ORGANIZED HEALTH CARE EDUCATION/TRAINING PROGRAM

## 2021-05-14 PROCEDURE — 99232 SBSQ HOSP IP/OBS MODERATE 35: CPT | Performed by: STUDENT IN AN ORGANIZED HEALTH CARE EDUCATION/TRAINING PROGRAM

## 2021-05-14 PROCEDURE — 0DB98ZX EXCISION OF DUODENUM, VIA NATURAL OR ARTIFICIAL OPENING ENDOSCOPIC, DIAGNOSTIC: ICD-10-PCS | Performed by: INTERNAL MEDICINE

## 2021-05-14 PROCEDURE — 91110 GI TRC IMG INTRAL ESOPH-ILE: CPT

## 2021-05-14 RX ORDER — PROPOFOL 10 MG/ML
VIAL (ML) INTRAVENOUS AS NEEDED
Status: DISCONTINUED | OUTPATIENT
Start: 2021-05-14 | End: 2021-05-14 | Stop reason: SURG

## 2021-05-14 RX ORDER — NICOTINE 21 MG/24HR
1 PATCH, TRANSDERMAL 24 HOURS TRANSDERMAL
Status: DISCONTINUED | OUTPATIENT
Start: 2021-05-15 | End: 2021-05-21 | Stop reason: HOSPADM

## 2021-05-14 RX ORDER — SODIUM CHLORIDE 9 MG/ML
INJECTION, SOLUTION INTRAVENOUS CONTINUOUS PRN
Status: DISCONTINUED | OUTPATIENT
Start: 2021-05-14 | End: 2021-05-14 | Stop reason: SURG

## 2021-05-14 RX ORDER — LIDOCAINE HYDROCHLORIDE 20 MG/ML
INJECTION, SOLUTION INTRAVENOUS AS NEEDED
Status: DISCONTINUED | OUTPATIENT
Start: 2021-05-14 | End: 2021-05-14 | Stop reason: SURG

## 2021-05-14 RX ORDER — SIMETHICONE 20 MG/.3ML
333 EMULSION ORAL ONCE
Status: COMPLETED | OUTPATIENT
Start: 2021-05-14 | End: 2021-05-14

## 2021-05-14 RX ADMIN — DEXTROSE MONOHYDRATE 25 G: 500 INJECTION PARENTERAL at 18:33

## 2021-05-14 RX ADMIN — METOPROLOL TARTRATE 100 MG: 50 TABLET, FILM COATED ORAL at 09:29

## 2021-05-14 RX ADMIN — Medication 333 MG: at 21:37

## 2021-05-14 RX ADMIN — SODIUM CHLORIDE, PRESERVATIVE FREE 10 ML: 5 INJECTION INTRAVENOUS at 09:28

## 2021-05-14 RX ADMIN — DULOXETINE HYDROCHLORIDE 20 MG: 20 CAPSULE, DELAYED RELEASE ORAL at 09:28

## 2021-05-14 RX ADMIN — PROPOFOL 20 MG: 10 INJECTION, EMULSION INTRAVENOUS at 17:09

## 2021-05-14 RX ADMIN — SODIUM BICARBONATE 650 MG: 650 TABLET ORAL at 21:36

## 2021-05-14 RX ADMIN — NICOTINE 1 PATCH: 14 PATCH, EXTENDED RELEASE TRANSDERMAL at 23:12

## 2021-05-14 RX ADMIN — OXYBUTYNIN CHLORIDE 5 MG: 5 TABLET, EXTENDED RELEASE ORAL at 09:29

## 2021-05-14 RX ADMIN — NYSTATIN: 100000 POWDER TOPICAL at 20:45

## 2021-05-14 RX ADMIN — GABAPENTIN 800 MG: 400 CAPSULE ORAL at 20:39

## 2021-05-14 RX ADMIN — PANTOPRAZOLE SODIUM 40 MG: 40 INJECTION, POWDER, FOR SOLUTION INTRAVENOUS at 20:41

## 2021-05-14 RX ADMIN — PROPOFOL 50 MG: 10 INJECTION, EMULSION INTRAVENOUS at 17:01

## 2021-05-14 RX ADMIN — DEXTROSE MONOHYDRATE 25 G: 500 INJECTION PARENTERAL at 20:00

## 2021-05-14 RX ADMIN — SODIUM BICARBONATE 650 MG: 650 TABLET ORAL at 09:28

## 2021-05-14 RX ADMIN — GABAPENTIN 800 MG: 400 CAPSULE ORAL at 13:54

## 2021-05-14 RX ADMIN — DEXTROSE MONOHYDRATE 25 G: 500 INJECTION PARENTERAL at 03:59

## 2021-05-14 RX ADMIN — METOPROLOL TARTRATE 100 MG: 50 TABLET, FILM COATED ORAL at 20:41

## 2021-05-14 RX ADMIN — POTASSIUM CHLORIDE 10 MEQ: 750 CAPSULE, EXTENDED RELEASE ORAL at 09:29

## 2021-05-14 RX ADMIN — DEXTROSE MONOHYDRATE 25 G: 500 INJECTION PARENTERAL at 06:40

## 2021-05-14 RX ADMIN — SODIUM CHLORIDE, PRESERVATIVE FREE 10 ML: 5 INJECTION INTRAVENOUS at 20:45

## 2021-05-14 RX ADMIN — ATORVASTATIN CALCIUM 40 MG: 40 TABLET, FILM COATED ORAL at 09:28

## 2021-05-14 RX ADMIN — LISINOPRIL 10 MG: 5 TABLET ORAL at 09:29

## 2021-05-14 RX ADMIN — SODIUM CHLORIDE: 9 INJECTION, SOLUTION INTRAVENOUS at 16:53

## 2021-05-14 RX ADMIN — DEXTROSE MONOHYDRATE 25 G: 500 INJECTION PARENTERAL at 10:33

## 2021-05-14 RX ADMIN — NYSTATIN: 100000 POWDER TOPICAL at 09:28

## 2021-05-14 RX ADMIN — PROPOFOL 20 MG: 10 INJECTION, EMULSION INTRAVENOUS at 17:07

## 2021-05-14 RX ADMIN — DEXTROSE MONOHYDRATE 25 G: 500 INJECTION PARENTERAL at 13:54

## 2021-05-14 RX ADMIN — SUCRALFATE 1 G: 1 TABLET ORAL at 20:39

## 2021-05-14 RX ADMIN — LIDOCAINE HYDROCHLORIDE 100 MG: 20 INJECTION, SOLUTION INTRAVENOUS at 17:01

## 2021-05-14 RX ADMIN — PROPOFOL 20 MG: 10 INJECTION, EMULSION INTRAVENOUS at 17:04

## 2021-05-14 NOTE — PROGRESS NOTES
I have helped formulate and discussed the assessment and plan with Dr.Hassan Altamirano.    I have spoken with the patient .   I have reviewed the notes, assessments, and/or procedures performed by Dr. Alejandra Altamirano, I concur with his  documentation and assessment and plan for Yamilethjameson SmithNga Bryon.          This document has been electronically signed by Helder Lee MD on May 14, 2021 10:23 CDT

## 2021-05-14 NOTE — ANESTHESIA POSTPROCEDURE EVALUATION
Patient: Yamileth Slater    Procedure Summary     Date: 05/14/21 Room / Location: [facility] ENDOSCOPY 1 / [facility] ENDOSCOPY    Anesthesia Start: 1653 Anesthesia Stop: 1718    Procedures:       ESOPHAGOGASTRODUODENOSCOPY (N/A )      COLONOSCOPY (N/A ) Diagnosis:       Gastrointestinal hemorrhage, unspecified gastrointestinal hemorrhage type      (Gastrointestinal hemorrhage, unspecified gastrointestinal hemorrhage type [K92.2])    Surgeons: Mingo Duarte MD Provider: Hannah Arias CRNA    Anesthesia Type: MAC ASA Status: 4          Anesthesia Type: MAC    Vitals  No vitals data found for the desired time range.          Post Anesthesia Care and Evaluation    Patient location during evaluation: bedside  Patient participation: complete - patient participated  Level of consciousness: awake  Pain score: 0  Pain management: adequate  Airway patency: patent  Anesthetic complications: No anesthetic complications  PONV Status: none  Cardiovascular status: acceptable  Respiratory status: acceptable  Hydration status: acceptable    Comments: 172/79 60 24 100% on O2

## 2021-05-14 NOTE — ANESTHESIA PREPROCEDURE EVALUATION
Anesthesia Evaluation     NPO Solid Status: > 8 hours  NPO Liquid Status: > 2 hours           Airway   Mallampati: III  TM distance: >3 FB  Neck ROM: full  Possible difficult intubation  Dental    (+) poor dentition    Pulmonary    (+) pneumonia , COPD, asthma,shortness of breath, sleep apnea,   Cardiovascular     Rhythm: regular  Rate: normal    (+) hypertension, past MI , CAD, CABG, dysrhythmias, PVD, hyperlipidemia,  carotid artery disease      Neuro/Psych  (+) numbness, psychiatric history Anxiety,     GI/Hepatic/Renal/Endo    (+) obesity, morbid obesity, GERD, GI bleeding , renal disease, diabetes mellitus,     Musculoskeletal     (+) neck pain,   Abdominal  - normal exam   Substance History      OB/GYN          Other                      Anesthesia Plan    ASA 4     MAC     intravenous induction     Anesthetic plan, all risks, benefits, and alternatives have been provided, discussed and informed consent has been obtained with: patient.    Plan discussed with CRNA.

## 2021-05-15 LAB
ARTERIAL PATENCY WRIST A: NEGATIVE
ARTERIAL PATENCY WRIST A: POSITIVE
ATMOSPHERIC PRESS: 753 MMHG
ATMOSPHERIC PRESS: 754 MMHG
BASE EXCESS BLDA CALC-SCNC: -4.4 MMOL/L (ref 0–2)
BASE EXCESS BLDA CALC-SCNC: -4.7 MMOL/L (ref 0–2)
BASOPHILS # BLD AUTO: 0.06 10*3/MM3 (ref 0–0.2)
BASOPHILS NFR BLD AUTO: 0.6 % (ref 0–1.5)
BDY SITE: ABNORMAL
BDY SITE: ABNORMAL
BH BB BLOOD EXPIRATION DATE: NORMAL
BH BB BLOOD EXPIRATION DATE: NORMAL
BH BB BLOOD TYPE BARCODE: NORMAL
BH BB BLOOD TYPE BARCODE: NORMAL
BH BB DISPENSE STATUS: NORMAL
BH BB DISPENSE STATUS: NORMAL
BH BB PRODUCT CODE: NORMAL
BH BB PRODUCT CODE: NORMAL
BH BB UNIT NUMBER: NORMAL
BH BB UNIT NUMBER: NORMAL
CROSSMATCH INTERPRETATION: NORMAL
CROSSMATCH INTERPRETATION: NORMAL
DEPRECATED RDW RBC AUTO: 55.3 FL (ref 37–54)
EOSINOPHIL # BLD AUTO: 0.28 10*3/MM3 (ref 0–0.4)
EOSINOPHIL NFR BLD AUTO: 2.8 % (ref 0.3–6.2)
EPAP: 8
ERYTHROCYTE [DISTWIDTH] IN BLOOD BY AUTOMATED COUNT: 16.8 % (ref 12.3–15.4)
GAS FLOW AIRWAY: 4 LPM
GLUCOSE BLDC GLUCOMTR-MCNC: 119 MG/DL (ref 70–130)
GLUCOSE BLDC GLUCOMTR-MCNC: 234 MG/DL (ref 70–130)
GLUCOSE BLDC GLUCOMTR-MCNC: 237 MG/DL (ref 70–130)
GLUCOSE BLDC GLUCOMTR-MCNC: 240 MG/DL (ref 70–130)
GLUCOSE BLDC GLUCOMTR-MCNC: 263 MG/DL (ref 70–130)
GLUCOSE BLDC GLUCOMTR-MCNC: 46 MG/DL (ref 70–130)
GLUCOSE BLDC GLUCOMTR-MCNC: 46 MG/DL (ref 70–130)
GLUCOSE BLDC GLUCOMTR-MCNC: 94 MG/DL (ref 70–130)
HCO3 BLDA-SCNC: 22.1 MMOL/L (ref 20–26)
HCO3 BLDA-SCNC: 22.8 MMOL/L (ref 20–26)
HCT VFR BLD AUTO: 30.2 % (ref 34–46.6)
HGB BLD-MCNC: 9.1 G/DL (ref 12–15.9)
HOLD SPECIMEN: NORMAL
IMM GRANULOCYTES # BLD AUTO: 0.05 10*3/MM3 (ref 0–0.05)
IMM GRANULOCYTES NFR BLD AUTO: 0.5 % (ref 0–0.5)
INHALED O2 CONCENTRATION: 40 %
IPAP: 20
LYMPHOCYTES # BLD AUTO: 1.64 10*3/MM3 (ref 0.7–3.1)
LYMPHOCYTES NFR BLD AUTO: 16.4 % (ref 19.6–45.3)
Lab: ABNORMAL
Lab: ABNORMAL
MCH RBC QN AUTO: 28.3 PG (ref 26.6–33)
MCHC RBC AUTO-ENTMCNC: 30.1 G/DL (ref 31.5–35.7)
MCV RBC AUTO: 94.1 FL (ref 79–97)
MODALITY: ABNORMAL
MODALITY: ABNORMAL
MONOCYTES # BLD AUTO: 0.52 10*3/MM3 (ref 0.1–0.9)
MONOCYTES NFR BLD AUTO: 5.2 % (ref 5–12)
NEUTROPHILS NFR BLD AUTO: 7.48 10*3/MM3 (ref 1.7–7)
NEUTROPHILS NFR BLD AUTO: 74.5 % (ref 42.7–76)
NRBC BLD AUTO-RTO: 0.3 /100 WBC (ref 0–0.2)
PCO2 BLDA: 47.1 MM HG (ref 35–45)
PCO2 BLDA: 49.1 MM HG (ref 35–45)
PH BLDA: 7.28 PH UNITS (ref 7.35–7.45)
PH BLDA: 7.28 PH UNITS (ref 7.35–7.45)
PLATELET # BLD AUTO: 231 10*3/MM3 (ref 140–450)
PMV BLD AUTO: 9 FL (ref 6–12)
PO2 BLDA: 111 MM HG (ref 83–108)
PO2 BLDA: 63.6 MM HG (ref 83–108)
RBC # BLD AUTO: 3.21 10*6/MM3 (ref 3.77–5.28)
SAO2 % BLDCOA: 91 % (ref 94–99)
SAO2 % BLDCOA: 98.1 % (ref 94–99)
UNIT  ABO: NORMAL
UNIT  ABO: NORMAL
UNIT  RH: NORMAL
UNIT  RH: NORMAL
VENTILATOR MODE: ABNORMAL
VENTILATOR MODE: ABNORMAL
WBC # BLD AUTO: 10.03 10*3/MM3 (ref 3.4–10.8)

## 2021-05-15 PROCEDURE — 99232 SBSQ HOSP IP/OBS MODERATE 35: CPT | Performed by: STUDENT IN AN ORGANIZED HEALTH CARE EDUCATION/TRAINING PROGRAM

## 2021-05-15 PROCEDURE — 94760 N-INVAS EAR/PLS OXIMETRY 1: CPT

## 2021-05-15 PROCEDURE — 85025 COMPLETE CBC W/AUTO DIFF WBC: CPT | Performed by: INTERNAL MEDICINE

## 2021-05-15 PROCEDURE — 82803 BLOOD GASES ANY COMBINATION: CPT

## 2021-05-15 PROCEDURE — 36415 COLL VENOUS BLD VENIPUNCTURE: CPT | Performed by: INTERNAL MEDICINE

## 2021-05-15 PROCEDURE — 36600 WITHDRAWAL OF ARTERIAL BLOOD: CPT

## 2021-05-15 PROCEDURE — 94660 CPAP INITIATION&MGMT: CPT

## 2021-05-15 PROCEDURE — 94640 AIRWAY INHALATION TREATMENT: CPT

## 2021-05-15 PROCEDURE — 63710000001 INSULIN DETEMIR PER 5 UNITS: Performed by: STUDENT IN AN ORGANIZED HEALTH CARE EDUCATION/TRAINING PROGRAM

## 2021-05-15 PROCEDURE — 82962 GLUCOSE BLOOD TEST: CPT

## 2021-05-15 PROCEDURE — 94799 UNLISTED PULMONARY SVC/PX: CPT

## 2021-05-15 PROCEDURE — 94762 N-INVAS EAR/PLS OXIMTRY CONT: CPT

## 2021-05-15 RX ADMIN — ATORVASTATIN CALCIUM 40 MG: 40 TABLET, FILM COATED ORAL at 08:37

## 2021-05-15 RX ADMIN — METOPROLOL TARTRATE 100 MG: 50 TABLET, FILM COATED ORAL at 20:16

## 2021-05-15 RX ADMIN — DULOXETINE HYDROCHLORIDE 20 MG: 20 CAPSULE, DELAYED RELEASE ORAL at 08:39

## 2021-05-15 RX ADMIN — DEXTROSE MONOHYDRATE 25 G: 500 INJECTION PARENTERAL at 03:19

## 2021-05-15 RX ADMIN — ALBUTEROL SULFATE 2.5 MG: 2.5 SOLUTION RESPIRATORY (INHALATION) at 02:19

## 2021-05-15 RX ADMIN — SODIUM CHLORIDE, PRESERVATIVE FREE 10 ML: 5 INJECTION INTRAVENOUS at 08:37

## 2021-05-15 RX ADMIN — GABAPENTIN 800 MG: 400 CAPSULE ORAL at 14:28

## 2021-05-15 RX ADMIN — GABAPENTIN 800 MG: 400 CAPSULE ORAL at 20:15

## 2021-05-15 RX ADMIN — POTASSIUM CHLORIDE 10 MEQ: 750 CAPSULE, EXTENDED RELEASE ORAL at 08:37

## 2021-05-15 RX ADMIN — CLOPIDOGREL BISULFATE 75 MG: 75 TABLET ORAL at 08:37

## 2021-05-15 RX ADMIN — NYSTATIN: 100000 POWDER TOPICAL at 20:20

## 2021-05-15 RX ADMIN — NYSTATIN: 100000 POWDER TOPICAL at 08:39

## 2021-05-15 RX ADMIN — METOPROLOL TARTRATE 100 MG: 50 TABLET, FILM COATED ORAL at 08:37

## 2021-05-15 RX ADMIN — PANTOPRAZOLE SODIUM 40 MG: 40 INJECTION, POWDER, FOR SOLUTION INTRAVENOUS at 05:49

## 2021-05-15 RX ADMIN — SODIUM CHLORIDE, PRESERVATIVE FREE 10 ML: 5 INJECTION INTRAVENOUS at 20:18

## 2021-05-15 RX ADMIN — SUCRALFATE 1 G: 1 TABLET ORAL at 20:16

## 2021-05-15 RX ADMIN — OXYBUTYNIN CHLORIDE 5 MG: 5 TABLET, EXTENDED RELEASE ORAL at 08:37

## 2021-05-15 RX ADMIN — PANTOPRAZOLE SODIUM 40 MG: 40 INJECTION, POWDER, FOR SOLUTION INTRAVENOUS at 17:58

## 2021-05-15 RX ADMIN — LISINOPRIL 10 MG: 5 TABLET ORAL at 08:37

## 2021-05-15 RX ADMIN — ACETAMINOPHEN 650 MG: 325 TABLET, FILM COATED ORAL at 20:16

## 2021-05-15 RX ADMIN — INSULIN DETEMIR 10 UNITS: 100 INJECTION, SOLUTION SUBCUTANEOUS at 20:18

## 2021-05-15 RX ADMIN — SODIUM BICARBONATE 650 MG: 650 TABLET ORAL at 20:17

## 2021-05-15 RX ADMIN — GABAPENTIN 800 MG: 400 CAPSULE ORAL at 05:51

## 2021-05-15 RX ADMIN — SODIUM BICARBONATE 650 MG: 650 TABLET ORAL at 08:37

## 2021-05-16 ENCOUNTER — APPOINTMENT (OUTPATIENT)
Dept: CT IMAGING | Facility: HOSPITAL | Age: 61
End: 2021-05-16

## 2021-05-16 LAB
ANION GAP SERPL CALCULATED.3IONS-SCNC: 8 MMOL/L (ref 5–15)
BASOPHILS # BLD AUTO: 0.09 10*3/MM3 (ref 0–0.2)
BASOPHILS NFR BLD AUTO: 1.2 % (ref 0–1.5)
BUN SERPL-MCNC: 31 MG/DL (ref 8–23)
BUN/CREAT SERPL: 14.8 (ref 7–25)
CALCIUM SPEC-SCNC: 9.1 MG/DL (ref 8.6–10.5)
CHLORIDE SERPL-SCNC: 107 MMOL/L (ref 98–107)
CO2 SERPL-SCNC: 23 MMOL/L (ref 22–29)
CREAT SERPL-MCNC: 2.09 MG/DL (ref 0.57–1)
DEPRECATED RDW RBC AUTO: 54.1 FL (ref 37–54)
EOSINOPHIL # BLD AUTO: 0.37 10*3/MM3 (ref 0–0.4)
EOSINOPHIL NFR BLD AUTO: 4.9 % (ref 0.3–6.2)
ERYTHROCYTE [DISTWIDTH] IN BLOOD BY AUTOMATED COUNT: 16.9 % (ref 12.3–15.4)
GFR SERPL CREATININE-BSD FRML MDRD: 24 ML/MIN/1.73
GLUCOSE BLDC GLUCOMTR-MCNC: 126 MG/DL (ref 70–130)
GLUCOSE BLDC GLUCOMTR-MCNC: 163 MG/DL (ref 70–130)
GLUCOSE BLDC GLUCOMTR-MCNC: 181 MG/DL (ref 70–130)
GLUCOSE BLDC GLUCOMTR-MCNC: 36 MG/DL (ref 70–130)
GLUCOSE SERPL-MCNC: 158 MG/DL (ref 65–99)
HCT VFR BLD AUTO: 30.5 % (ref 34–46.6)
HGB BLD-MCNC: 9.6 G/DL (ref 12–15.9)
HOLD SPECIMEN: NORMAL
IMM GRANULOCYTES # BLD AUTO: 0.04 10*3/MM3 (ref 0–0.05)
IMM GRANULOCYTES NFR BLD AUTO: 0.5 % (ref 0–0.5)
LYMPHOCYTES # BLD AUTO: 2.11 10*3/MM3 (ref 0.7–3.1)
LYMPHOCYTES NFR BLD AUTO: 28.2 % (ref 19.6–45.3)
MCH RBC QN AUTO: 29.6 PG (ref 26.6–33)
MCHC RBC AUTO-ENTMCNC: 31.5 G/DL (ref 31.5–35.7)
MCV RBC AUTO: 94.1 FL (ref 79–97)
MONOCYTES # BLD AUTO: 0.49 10*3/MM3 (ref 0.1–0.9)
MONOCYTES NFR BLD AUTO: 6.5 % (ref 5–12)
NEUTROPHILS NFR BLD AUTO: 4.39 10*3/MM3 (ref 1.7–7)
NEUTROPHILS NFR BLD AUTO: 58.7 % (ref 42.7–76)
NRBC BLD AUTO-RTO: 0 /100 WBC (ref 0–0.2)
PLATELET # BLD AUTO: 204 10*3/MM3 (ref 140–450)
PMV BLD AUTO: 8.9 FL (ref 6–12)
POTASSIUM SERPL-SCNC: 5.5 MMOL/L (ref 3.5–5.2)
RBC # BLD AUTO: 3.24 10*6/MM3 (ref 3.77–5.28)
SODIUM SERPL-SCNC: 138 MMOL/L (ref 136–145)
WBC # BLD AUTO: 7.49 10*3/MM3 (ref 3.4–10.8)

## 2021-05-16 PROCEDURE — 82962 GLUCOSE BLOOD TEST: CPT

## 2021-05-16 PROCEDURE — 99231 SBSQ HOSP IP/OBS SF/LOW 25: CPT | Performed by: STUDENT IN AN ORGANIZED HEALTH CARE EDUCATION/TRAINING PROGRAM

## 2021-05-16 PROCEDURE — 0 IODIXANOL PER 1 ML: Performed by: STUDENT IN AN ORGANIZED HEALTH CARE EDUCATION/TRAINING PROGRAM

## 2021-05-16 PROCEDURE — 63710000001 INSULIN DETEMIR PER 5 UNITS: Performed by: STUDENT IN AN ORGANIZED HEALTH CARE EDUCATION/TRAINING PROGRAM

## 2021-05-16 PROCEDURE — 94799 UNLISTED PULMONARY SVC/PX: CPT

## 2021-05-16 PROCEDURE — 94760 N-INVAS EAR/PLS OXIMETRY 1: CPT

## 2021-05-16 PROCEDURE — 94660 CPAP INITIATION&MGMT: CPT

## 2021-05-16 PROCEDURE — 80048 BASIC METABOLIC PNL TOTAL CA: CPT | Performed by: STUDENT IN AN ORGANIZED HEALTH CARE EDUCATION/TRAINING PROGRAM

## 2021-05-16 PROCEDURE — 71275 CT ANGIOGRAPHY CHEST: CPT

## 2021-05-16 PROCEDURE — 85025 COMPLETE CBC W/AUTO DIFF WBC: CPT | Performed by: INTERNAL MEDICINE

## 2021-05-16 RX ORDER — SODIUM CHLORIDE 9 MG/ML
125 INJECTION, SOLUTION INTRAVENOUS CONTINUOUS
Status: DISPENSED | OUTPATIENT
Start: 2021-05-16 | End: 2021-05-16

## 2021-05-16 RX ORDER — IODIXANOL 320 MG/ML
100 INJECTION, SOLUTION INTRAVASCULAR
Status: COMPLETED | OUTPATIENT
Start: 2021-05-16 | End: 2021-05-16

## 2021-05-16 RX ORDER — SODIUM CHLORIDE 9 MG/ML
125 INJECTION, SOLUTION INTRAVENOUS CONTINUOUS
Status: DISPENSED | OUTPATIENT
Start: 2021-05-16 | End: 2021-05-17

## 2021-05-16 RX ADMIN — NYSTATIN: 100000 POWDER TOPICAL at 20:48

## 2021-05-16 RX ADMIN — SUCRALFATE 1 G: 1 TABLET ORAL at 20:34

## 2021-05-16 RX ADMIN — IODIXANOL 81 ML: 320 INJECTION, SOLUTION INTRAVASCULAR at 16:09

## 2021-05-16 RX ADMIN — GABAPENTIN 800 MG: 400 CAPSULE ORAL at 06:03

## 2021-05-16 RX ADMIN — ATORVASTATIN CALCIUM 40 MG: 40 TABLET, FILM COATED ORAL at 08:40

## 2021-05-16 RX ADMIN — SODIUM CHLORIDE 125 ML/HR: 9 INJECTION, SOLUTION INTRAVENOUS at 09:59

## 2021-05-16 RX ADMIN — SODIUM CHLORIDE, PRESERVATIVE FREE 10 ML: 5 INJECTION INTRAVENOUS at 08:39

## 2021-05-16 RX ADMIN — OXYBUTYNIN CHLORIDE 5 MG: 5 TABLET, EXTENDED RELEASE ORAL at 08:39

## 2021-05-16 RX ADMIN — NICOTINE 1 PATCH: 14 PATCH, EXTENDED RELEASE TRANSDERMAL at 08:38

## 2021-05-16 RX ADMIN — METOPROLOL TARTRATE 100 MG: 50 TABLET, FILM COATED ORAL at 08:39

## 2021-05-16 RX ADMIN — SODIUM BICARBONATE 650 MG: 650 TABLET ORAL at 20:34

## 2021-05-16 RX ADMIN — DULOXETINE HYDROCHLORIDE 20 MG: 20 CAPSULE, DELAYED RELEASE ORAL at 08:39

## 2021-05-16 RX ADMIN — METOPROLOL TARTRATE 100 MG: 50 TABLET, FILM COATED ORAL at 20:34

## 2021-05-16 RX ADMIN — LISINOPRIL 10 MG: 5 TABLET ORAL at 08:40

## 2021-05-16 RX ADMIN — SODIUM CHLORIDE, PRESERVATIVE FREE 10 ML: 5 INJECTION INTRAVENOUS at 20:35

## 2021-05-16 RX ADMIN — CLOPIDOGREL BISULFATE 75 MG: 75 TABLET ORAL at 08:39

## 2021-05-16 RX ADMIN — SODIUM BICARBONATE 650 MG: 650 TABLET ORAL at 08:39

## 2021-05-16 RX ADMIN — GABAPENTIN 800 MG: 400 CAPSULE ORAL at 13:49

## 2021-05-16 RX ADMIN — PANTOPRAZOLE SODIUM 40 MG: 40 INJECTION, POWDER, FOR SOLUTION INTRAVENOUS at 17:55

## 2021-05-16 RX ADMIN — SODIUM CHLORIDE 125 ML/HR: 9 INJECTION, SOLUTION INTRAVENOUS at 17:55

## 2021-05-16 RX ADMIN — GABAPENTIN 800 MG: 400 CAPSULE ORAL at 20:34

## 2021-05-16 RX ADMIN — POTASSIUM CHLORIDE 10 MEQ: 750 CAPSULE, EXTENDED RELEASE ORAL at 08:41

## 2021-05-16 RX ADMIN — PANTOPRAZOLE SODIUM 40 MG: 40 INJECTION, POWDER, FOR SOLUTION INTRAVENOUS at 06:04

## 2021-05-16 RX ADMIN — ASPIRIN 81 MG: 81 TABLET, FILM COATED ORAL at 08:39

## 2021-05-16 RX ADMIN — INSULIN DETEMIR 10 UNITS: 100 INJECTION, SOLUTION SUBCUTANEOUS at 20:35

## 2021-05-17 ENCOUNTER — APPOINTMENT (OUTPATIENT)
Dept: ULTRASOUND IMAGING | Facility: HOSPITAL | Age: 61
End: 2021-05-17

## 2021-05-17 ENCOUNTER — APPOINTMENT (OUTPATIENT)
Dept: INTERVENTIONAL RADIOLOGY/VASCULAR | Facility: HOSPITAL | Age: 61
End: 2021-05-17

## 2021-05-17 ENCOUNTER — HOME HEALTH ADMISSION (OUTPATIENT)
Dept: HOME HEALTH SERVICES | Facility: HOME HEALTHCARE | Age: 61
End: 2021-05-17

## 2021-05-17 ENCOUNTER — TRANSCRIBE ORDERS (OUTPATIENT)
Dept: HOME HEALTH SERVICES | Facility: HOME HEALTHCARE | Age: 61
End: 2021-05-17

## 2021-05-17 DIAGNOSIS — I21.4 ACUTE MYOCARDIAL INFARCTION, SUBENDOCARDIAL INFARCTION, INITIAL EPISODE OF CARE (HCC): Primary | ICD-10-CM

## 2021-05-17 LAB
ANION GAP SERPL CALCULATED.3IONS-SCNC: 8 MMOL/L (ref 5–15)
BASOPHILS # BLD AUTO: 0.08 10*3/MM3 (ref 0–0.2)
BASOPHILS NFR BLD AUTO: 1.1 % (ref 0–1.5)
BUN SERPL-MCNC: 32 MG/DL (ref 8–23)
BUN/CREAT SERPL: 15.5 (ref 7–25)
CALCIUM SPEC-SCNC: 9.1 MG/DL (ref 8.6–10.5)
CHLORIDE SERPL-SCNC: 105 MMOL/L (ref 98–107)
CO2 SERPL-SCNC: 25 MMOL/L (ref 22–29)
CREAT SERPL-MCNC: 2.06 MG/DL (ref 0.57–1)
DEPRECATED RDW RBC AUTO: 54 FL (ref 37–54)
EOSINOPHIL # BLD AUTO: 0.33 10*3/MM3 (ref 0–0.4)
EOSINOPHIL NFR BLD AUTO: 4.5 % (ref 0.3–6.2)
ERYTHROCYTE [DISTWIDTH] IN BLOOD BY AUTOMATED COUNT: 16.2 % (ref 12.3–15.4)
GFR SERPL CREATININE-BSD FRML MDRD: 24 ML/MIN/1.73
GLUCOSE BLDC GLUCOMTR-MCNC: 182 MG/DL (ref 70–130)
GLUCOSE BLDC GLUCOMTR-MCNC: 220 MG/DL (ref 70–130)
GLUCOSE BLDC GLUCOMTR-MCNC: 235 MG/DL (ref 70–130)
GLUCOSE SERPL-MCNC: 208 MG/DL (ref 65–99)
HCT VFR BLD AUTO: 31 % (ref 34–46.6)
HGB BLD-MCNC: 9.4 G/DL (ref 12–15.9)
IMM GRANULOCYTES # BLD AUTO: 0.04 10*3/MM3 (ref 0–0.05)
IMM GRANULOCYTES NFR BLD AUTO: 0.5 % (ref 0–0.5)
L PNEUMO1 AG UR QL IA: NEGATIVE
LYMPHOCYTES # BLD AUTO: 2.06 10*3/MM3 (ref 0.7–3.1)
LYMPHOCYTES NFR BLD AUTO: 28.1 % (ref 19.6–45.3)
MCH RBC QN AUTO: 28.3 PG (ref 26.6–33)
MCHC RBC AUTO-ENTMCNC: 30.3 G/DL (ref 31.5–35.7)
MCV RBC AUTO: 93.4 FL (ref 79–97)
MONOCYTES # BLD AUTO: 0.45 10*3/MM3 (ref 0.1–0.9)
MONOCYTES NFR BLD AUTO: 6.1 % (ref 5–12)
MYCOPLASMAE PNEUMONIAE BY PCR: NEGATIVE
NEUTROPHILS NFR BLD AUTO: 4.38 10*3/MM3 (ref 1.7–7)
NEUTROPHILS NFR BLD AUTO: 59.7 % (ref 42.7–76)
NRBC BLD AUTO-RTO: 0 /100 WBC (ref 0–0.2)
PLATELET # BLD AUTO: 234 10*3/MM3 (ref 140–450)
PMV BLD AUTO: 8.9 FL (ref 6–12)
POTASSIUM SERPL-SCNC: 5.1 MMOL/L (ref 3.5–5.2)
RBC # BLD AUTO: 3.32 10*6/MM3 (ref 3.77–5.28)
S PNEUM AG SPEC QL LA: NEGATIVE
SODIUM SERPL-SCNC: 138 MMOL/L (ref 136–145)
WBC # BLD AUTO: 7.34 10*3/MM3 (ref 3.4–10.8)

## 2021-05-17 PROCEDURE — 99232 SBSQ HOSP IP/OBS MODERATE 35: CPT | Performed by: STUDENT IN AN ORGANIZED HEALTH CARE EDUCATION/TRAINING PROGRAM

## 2021-05-17 PROCEDURE — 80048 BASIC METABOLIC PNL TOTAL CA: CPT | Performed by: STUDENT IN AN ORGANIZED HEALTH CARE EDUCATION/TRAINING PROGRAM

## 2021-05-17 PROCEDURE — 63710000001 INSULIN DETEMIR PER 5 UNITS: Performed by: STUDENT IN AN ORGANIZED HEALTH CARE EDUCATION/TRAINING PROGRAM

## 2021-05-17 PROCEDURE — 25010000002 AZITHROMYCIN PER 500 MG: Performed by: STUDENT IN AN ORGANIZED HEALTH CARE EDUCATION/TRAINING PROGRAM

## 2021-05-17 PROCEDURE — 63710000001 INSULIN ASPART PER 5 UNITS: Performed by: INTERNAL MEDICINE

## 2021-05-17 PROCEDURE — 94760 N-INVAS EAR/PLS OXIMETRY 1: CPT

## 2021-05-17 PROCEDURE — 36410 VNPNXR 3YR/> PHY/QHP DX/THER: CPT

## 2021-05-17 PROCEDURE — 94799 UNLISTED PULMONARY SVC/PX: CPT

## 2021-05-17 PROCEDURE — 87581 M.PNEUMON DNA AMP PROBE: CPT | Performed by: STUDENT IN AN ORGANIZED HEALTH CARE EDUCATION/TRAINING PROGRAM

## 2021-05-17 PROCEDURE — 36415 COLL VENOUS BLD VENIPUNCTURE: CPT | Performed by: INTERNAL MEDICINE

## 2021-05-17 PROCEDURE — 82962 GLUCOSE BLOOD TEST: CPT

## 2021-05-17 PROCEDURE — 25010000002 CEFTRIAXONE PER 250 MG: Performed by: STUDENT IN AN ORGANIZED HEALTH CARE EDUCATION/TRAINING PROGRAM

## 2021-05-17 PROCEDURE — 87899 AGENT NOS ASSAY W/OPTIC: CPT | Performed by: STUDENT IN AN ORGANIZED HEALTH CARE EDUCATION/TRAINING PROGRAM

## 2021-05-17 PROCEDURE — 85025 COMPLETE CBC W/AUTO DIFF WBC: CPT | Performed by: INTERNAL MEDICINE

## 2021-05-17 PROCEDURE — C1751 CATH, INF, PER/CENT/MIDLINE: HCPCS

## 2021-05-17 PROCEDURE — 76937 US GUIDE VASCULAR ACCESS: CPT

## 2021-05-17 PROCEDURE — 99232 SBSQ HOSP IP/OBS MODERATE 35: CPT | Performed by: INTERNAL MEDICINE

## 2021-05-17 RX ORDER — IPRATROPIUM BROMIDE AND ALBUTEROL SULFATE 2.5; .5 MG/3ML; MG/3ML
3 SOLUTION RESPIRATORY (INHALATION)
Status: DISCONTINUED | OUTPATIENT
Start: 2021-05-17 | End: 2021-05-21 | Stop reason: HOSPADM

## 2021-05-17 RX ADMIN — IPRATROPIUM BROMIDE AND ALBUTEROL SULFATE 3 ML: 2.5; .5 SOLUTION RESPIRATORY (INHALATION) at 16:17

## 2021-05-17 RX ADMIN — LIDOCAINE 1 PATCH: 50 PATCH CUTANEOUS at 08:02

## 2021-05-17 RX ADMIN — CEFTRIAXONE SODIUM 1 G: 1 INJECTION, POWDER, FOR SOLUTION INTRAMUSCULAR; INTRAVENOUS at 11:54

## 2021-05-17 RX ADMIN — GABAPENTIN 800 MG: 400 CAPSULE ORAL at 13:08

## 2021-05-17 RX ADMIN — SODIUM CHLORIDE, PRESERVATIVE FREE 10 ML: 5 INJECTION INTRAVENOUS at 20:20

## 2021-05-17 RX ADMIN — PANTOPRAZOLE SODIUM 40 MG: 40 INJECTION, POWDER, FOR SOLUTION INTRAVENOUS at 17:53

## 2021-05-17 RX ADMIN — ACETAMINOPHEN 650 MG: 325 TABLET, FILM COATED ORAL at 02:31

## 2021-05-17 RX ADMIN — DULOXETINE HYDROCHLORIDE 20 MG: 20 CAPSULE, DELAYED RELEASE ORAL at 08:01

## 2021-05-17 RX ADMIN — INSULIN ASPART 8 UNITS: 100 INJECTION, SOLUTION INTRAVENOUS; SUBCUTANEOUS at 12:02

## 2021-05-17 RX ADMIN — ASPIRIN 81 MG: 81 TABLET, FILM COATED ORAL at 08:14

## 2021-05-17 RX ADMIN — METOPROLOL TARTRATE 100 MG: 50 TABLET, FILM COATED ORAL at 20:19

## 2021-05-17 RX ADMIN — HYDROCODONE BITARTRATE AND ACETAMINOPHEN 1 TABLET: 5; 325 TABLET ORAL at 20:47

## 2021-05-17 RX ADMIN — ALBUTEROL SULFATE 2.5 MG: 2.5 SOLUTION RESPIRATORY (INHALATION) at 05:36

## 2021-05-17 RX ADMIN — METOPROLOL TARTRATE 100 MG: 50 TABLET, FILM COATED ORAL at 08:01

## 2021-05-17 RX ADMIN — GABAPENTIN 800 MG: 400 CAPSULE ORAL at 20:19

## 2021-05-17 RX ADMIN — INSULIN ASPART 5 UNITS: 100 INJECTION, SOLUTION INTRAVENOUS; SUBCUTANEOUS at 17:52

## 2021-05-17 RX ADMIN — INSULIN DETEMIR 10 UNITS: 100 INJECTION, SOLUTION SUBCUTANEOUS at 20:19

## 2021-05-17 RX ADMIN — POTASSIUM CHLORIDE 10 MEQ: 750 CAPSULE, EXTENDED RELEASE ORAL at 08:01

## 2021-05-17 RX ADMIN — LISINOPRIL 10 MG: 5 TABLET ORAL at 08:01

## 2021-05-17 RX ADMIN — GABAPENTIN 800 MG: 400 CAPSULE ORAL at 05:28

## 2021-05-17 RX ADMIN — INSULIN ASPART 5 UNITS: 100 INJECTION, SOLUTION INTRAVENOUS; SUBCUTANEOUS at 08:03

## 2021-05-17 RX ADMIN — PANTOPRAZOLE SODIUM 40 MG: 40 INJECTION, POWDER, FOR SOLUTION INTRAVENOUS at 05:28

## 2021-05-17 RX ADMIN — CLOPIDOGREL BISULFATE 75 MG: 75 TABLET ORAL at 08:01

## 2021-05-17 RX ADMIN — NYSTATIN: 100000 POWDER TOPICAL at 08:03

## 2021-05-17 RX ADMIN — NICOTINE 1 PATCH: 14 PATCH, EXTENDED RELEASE TRANSDERMAL at 08:02

## 2021-05-17 RX ADMIN — SODIUM BICARBONATE 650 MG: 650 TABLET ORAL at 08:01

## 2021-05-17 RX ADMIN — SODIUM BICARBONATE 650 MG: 650 TABLET ORAL at 20:19

## 2021-05-17 RX ADMIN — INSULIN DETEMIR 10 UNITS: 100 INJECTION, SOLUTION SUBCUTANEOUS at 08:06

## 2021-05-17 RX ADMIN — IPRATROPIUM BROMIDE AND ALBUTEROL SULFATE 3 ML: 2.5; .5 SOLUTION RESPIRATORY (INHALATION) at 21:01

## 2021-05-17 RX ADMIN — OXYBUTYNIN CHLORIDE 5 MG: 5 TABLET, EXTENDED RELEASE ORAL at 08:01

## 2021-05-17 RX ADMIN — AZITHROMYCIN MONOHYDRATE 500 MG: 500 INJECTION, POWDER, LYOPHILIZED, FOR SOLUTION INTRAVENOUS at 13:08

## 2021-05-17 RX ADMIN — ATORVASTATIN CALCIUM 40 MG: 40 TABLET, FILM COATED ORAL at 08:01

## 2021-05-18 LAB
ANION GAP SERPL CALCULATED.3IONS-SCNC: 5 MMOL/L (ref 5–15)
ARTERIAL PATENCY WRIST A: ABNORMAL
ATMOSPHERIC PRESS: 752 MMHG
BASE EXCESS BLDA CALC-SCNC: -3.6 MMOL/L (ref 0–2)
BASOPHILS # BLD AUTO: 0.06 10*3/MM3 (ref 0–0.2)
BASOPHILS NFR BLD AUTO: 0.7 % (ref 0–1.5)
BDY SITE: ABNORMAL
BUN SERPL-MCNC: 35 MG/DL (ref 8–23)
BUN/CREAT SERPL: 16.4 (ref 7–25)
CALCIUM SPEC-SCNC: 9.3 MG/DL (ref 8.6–10.5)
CHLORIDE SERPL-SCNC: 103 MMOL/L (ref 98–107)
CO2 SERPL-SCNC: 25 MMOL/L (ref 22–29)
CREAT SERPL-MCNC: 2.13 MG/DL (ref 0.57–1)
DEPRECATED RDW RBC AUTO: 52.1 FL (ref 37–54)
EOSINOPHIL # BLD AUTO: 0.32 10*3/MM3 (ref 0–0.4)
EOSINOPHIL NFR BLD AUTO: 4 % (ref 0.3–6.2)
ERYTHROCYTE [DISTWIDTH] IN BLOOD BY AUTOMATED COUNT: 15.9 % (ref 12.3–15.4)
GAS FLOW AIRWAY: 4 LPM
GFR SERPL CREATININE-BSD FRML MDRD: 23 ML/MIN/1.73
GLUCOSE BLDC GLUCOMTR-MCNC: 202 MG/DL (ref 70–130)
GLUCOSE BLDC GLUCOMTR-MCNC: 213 MG/DL (ref 70–130)
GLUCOSE BLDC GLUCOMTR-MCNC: 216 MG/DL (ref 70–130)
GLUCOSE BLDC GLUCOMTR-MCNC: 251 MG/DL (ref 70–130)
GLUCOSE BLDC GLUCOMTR-MCNC: 272 MG/DL (ref 70–130)
GLUCOSE BLDC GLUCOMTR-MCNC: 384 MG/DL (ref 70–130)
GLUCOSE SERPL-MCNC: 204 MG/DL (ref 65–99)
HCO3 BLDA-SCNC: 24.1 MMOL/L (ref 20–26)
HCT VFR BLD AUTO: 31.4 % (ref 34–46.6)
HGB BLD-MCNC: 9.6 G/DL (ref 12–15.9)
IMM GRANULOCYTES # BLD AUTO: 0.08 10*3/MM3 (ref 0–0.05)
IMM GRANULOCYTES NFR BLD AUTO: 1 % (ref 0–0.5)
LYMPHOCYTES # BLD AUTO: 2.13 10*3/MM3 (ref 0.7–3.1)
LYMPHOCYTES NFR BLD AUTO: 26.6 % (ref 19.6–45.3)
Lab: ABNORMAL
MCH RBC QN AUTO: 28.3 PG (ref 26.6–33)
MCHC RBC AUTO-ENTMCNC: 30.6 G/DL (ref 31.5–35.7)
MCV RBC AUTO: 92.6 FL (ref 79–97)
MODALITY: ABNORMAL
MONOCYTES # BLD AUTO: 0.51 10*3/MM3 (ref 0.1–0.9)
MONOCYTES NFR BLD AUTO: 6.4 % (ref 5–12)
NEUTROPHILS NFR BLD AUTO: 4.92 10*3/MM3 (ref 1.7–7)
NEUTROPHILS NFR BLD AUTO: 61.3 % (ref 42.7–76)
NRBC BLD AUTO-RTO: 0.2 /100 WBC (ref 0–0.2)
PCO2 BLDA: 56.3 MM HG (ref 35–45)
PH BLDA: 7.24 PH UNITS (ref 7.35–7.45)
PLATELET # BLD AUTO: 248 10*3/MM3 (ref 140–450)
PMV BLD AUTO: 9.1 FL (ref 6–12)
PO2 BLDA: 73.3 MM HG (ref 83–108)
POTASSIUM SERPL-SCNC: 5.3 MMOL/L (ref 3.5–5.2)
RBC # BLD AUTO: 3.39 10*6/MM3 (ref 3.77–5.28)
SAO2 % BLDCOA: 94 % (ref 94–99)
SODIUM SERPL-SCNC: 133 MMOL/L (ref 136–145)
VENTILATOR MODE: ABNORMAL
WBC # BLD AUTO: 8.02 10*3/MM3 (ref 3.4–10.8)

## 2021-05-18 PROCEDURE — 94799 UNLISTED PULMONARY SVC/PX: CPT

## 2021-05-18 PROCEDURE — 99232 SBSQ HOSP IP/OBS MODERATE 35: CPT | Performed by: INTERNAL MEDICINE

## 2021-05-18 PROCEDURE — 85025 COMPLETE CBC W/AUTO DIFF WBC: CPT | Performed by: INTERNAL MEDICINE

## 2021-05-18 PROCEDURE — 94760 N-INVAS EAR/PLS OXIMETRY 1: CPT

## 2021-05-18 PROCEDURE — 63710000001 INSULIN DETEMIR PER 5 UNITS: Performed by: STUDENT IN AN ORGANIZED HEALTH CARE EDUCATION/TRAINING PROGRAM

## 2021-05-18 PROCEDURE — 36600 WITHDRAWAL OF ARTERIAL BLOOD: CPT

## 2021-05-18 PROCEDURE — 63710000001 INSULIN ASPART PER 5 UNITS: Performed by: INTERNAL MEDICINE

## 2021-05-18 PROCEDURE — 82962 GLUCOSE BLOOD TEST: CPT

## 2021-05-18 PROCEDURE — 94660 CPAP INITIATION&MGMT: CPT

## 2021-05-18 PROCEDURE — 25010000002 CEFTRIAXONE PER 250 MG: Performed by: STUDENT IN AN ORGANIZED HEALTH CARE EDUCATION/TRAINING PROGRAM

## 2021-05-18 PROCEDURE — 99232 SBSQ HOSP IP/OBS MODERATE 35: CPT | Performed by: STUDENT IN AN ORGANIZED HEALTH CARE EDUCATION/TRAINING PROGRAM

## 2021-05-18 PROCEDURE — 82803 BLOOD GASES ANY COMBINATION: CPT

## 2021-05-18 PROCEDURE — 36415 COLL VENOUS BLD VENIPUNCTURE: CPT | Performed by: INTERNAL MEDICINE

## 2021-05-18 PROCEDURE — 80048 BASIC METABOLIC PNL TOTAL CA: CPT | Performed by: STUDENT IN AN ORGANIZED HEALTH CARE EDUCATION/TRAINING PROGRAM

## 2021-05-18 RX ADMIN — SODIUM BICARBONATE 650 MG: 650 TABLET ORAL at 08:13

## 2021-05-18 RX ADMIN — INSULIN ASPART 5 UNITS: 100 INJECTION, SOLUTION INTRAVENOUS; SUBCUTANEOUS at 08:14

## 2021-05-18 RX ADMIN — CLOPIDOGREL BISULFATE 75 MG: 75 TABLET ORAL at 08:13

## 2021-05-18 RX ADMIN — METOPROLOL TARTRATE 100 MG: 50 TABLET, FILM COATED ORAL at 20:53

## 2021-05-18 RX ADMIN — PANTOPRAZOLE SODIUM 40 MG: 40 INJECTION, POWDER, FOR SOLUTION INTRAVENOUS at 17:32

## 2021-05-18 RX ADMIN — INSULIN ASPART 8 UNITS: 100 INJECTION, SOLUTION INTRAVENOUS; SUBCUTANEOUS at 12:17

## 2021-05-18 RX ADMIN — PANTOPRAZOLE SODIUM 40 MG: 40 INJECTION, POWDER, FOR SOLUTION INTRAVENOUS at 05:55

## 2021-05-18 RX ADMIN — IPRATROPIUM BROMIDE AND ALBUTEROL SULFATE 3 ML: 2.5; .5 SOLUTION RESPIRATORY (INHALATION) at 20:18

## 2021-05-18 RX ADMIN — OXYBUTYNIN CHLORIDE 5 MG: 5 TABLET, EXTENDED RELEASE ORAL at 08:13

## 2021-05-18 RX ADMIN — INSULIN ASPART 5 UNITS: 100 INJECTION, SOLUTION INTRAVENOUS; SUBCUTANEOUS at 17:32

## 2021-05-18 RX ADMIN — IPRATROPIUM BROMIDE AND ALBUTEROL SULFATE 3 ML: 2.5; .5 SOLUTION RESPIRATORY (INHALATION) at 14:51

## 2021-05-18 RX ADMIN — INSULIN DETEMIR 10 UNITS: 100 INJECTION, SOLUTION SUBCUTANEOUS at 20:54

## 2021-05-18 RX ADMIN — SODIUM CHLORIDE, PRESERVATIVE FREE 10 ML: 5 INJECTION INTRAVENOUS at 09:56

## 2021-05-18 RX ADMIN — DULOXETINE HYDROCHLORIDE 20 MG: 20 CAPSULE, DELAYED RELEASE ORAL at 08:13

## 2021-05-18 RX ADMIN — LISINOPRIL 10 MG: 5 TABLET ORAL at 08:13

## 2021-05-18 RX ADMIN — METOPROLOL TARTRATE 100 MG: 50 TABLET, FILM COATED ORAL at 08:13

## 2021-05-18 RX ADMIN — ASPIRIN 81 MG: 81 TABLET, FILM COATED ORAL at 08:13

## 2021-05-18 RX ADMIN — IPRATROPIUM BROMIDE AND ALBUTEROL SULFATE 3 ML: 2.5; .5 SOLUTION RESPIRATORY (INHALATION) at 08:29

## 2021-05-18 RX ADMIN — NICOTINE 1 PATCH: 14 PATCH, EXTENDED RELEASE TRANSDERMAL at 08:13

## 2021-05-18 RX ADMIN — SODIUM BICARBONATE 650 MG: 650 TABLET ORAL at 20:54

## 2021-05-18 RX ADMIN — GABAPENTIN 800 MG: 400 CAPSULE ORAL at 05:55

## 2021-05-18 RX ADMIN — LIDOCAINE 1 PATCH: 50 PATCH CUTANEOUS at 08:16

## 2021-05-18 RX ADMIN — ATORVASTATIN CALCIUM 40 MG: 40 TABLET, FILM COATED ORAL at 08:13

## 2021-05-18 RX ADMIN — MELATONIN 6 MG: at 20:54

## 2021-05-18 RX ADMIN — INSULIN DETEMIR 10 UNITS: 100 INJECTION, SOLUTION SUBCUTANEOUS at 08:17

## 2021-05-18 RX ADMIN — CEFTRIAXONE SODIUM 1 G: 1 INJECTION, POWDER, FOR SOLUTION INTRAMUSCULAR; INTRAVENOUS at 09:56

## 2021-05-18 RX ADMIN — SODIUM CHLORIDE, PRESERVATIVE FREE 10 ML: 5 INJECTION INTRAVENOUS at 20:54

## 2021-05-18 RX ADMIN — GABAPENTIN 800 MG: 400 CAPSULE ORAL at 20:53

## 2021-05-19 PROBLEM — J18.9 PNEUMONIA DUE TO INFECTIOUS ORGANISM: Status: ACTIVE | Noted: 2021-05-19

## 2021-05-19 LAB
AMPHET+METHAMPHET UR QL: NEGATIVE
AMPHETAMINES UR QL: NEGATIVE
ANION GAP SERPL CALCULATED.3IONS-SCNC: 7 MMOL/L (ref 5–15)
BARBITURATES UR QL SCN: NEGATIVE
BASOPHILS # BLD AUTO: 0.02 10*3/MM3 (ref 0–0.2)
BASOPHILS NFR BLD AUTO: 0.3 % (ref 0–1.5)
BENZODIAZ UR QL SCN: NEGATIVE
BUN SERPL-MCNC: 35 MG/DL (ref 8–23)
BUN/CREAT SERPL: 20 (ref 7–25)
BUPRENORPHINE SERPL-MCNC: NEGATIVE NG/ML
CALCIUM SPEC-SCNC: 9 MG/DL (ref 8.6–10.5)
CANNABINOIDS SERPL QL: NEGATIVE
CHLORIDE SERPL-SCNC: 108 MMOL/L (ref 98–107)
CO2 SERPL-SCNC: 23 MMOL/L (ref 22–29)
COCAINE UR QL: NEGATIVE
CREAT SERPL-MCNC: 1.75 MG/DL (ref 0.57–1)
DEPRECATED RDW RBC AUTO: 53.5 FL (ref 37–54)
EOSINOPHIL # BLD AUTO: 0.23 10*3/MM3 (ref 0–0.4)
EOSINOPHIL NFR BLD AUTO: 3.8 % (ref 0.3–6.2)
ERYTHROCYTE [DISTWIDTH] IN BLOOD BY AUTOMATED COUNT: 16.2 % (ref 12.3–15.4)
GFR SERPL CREATININE-BSD FRML MDRD: 29 ML/MIN/1.73
GLUCOSE BLDC GLUCOMTR-MCNC: 157 MG/DL (ref 70–130)
GLUCOSE BLDC GLUCOMTR-MCNC: 231 MG/DL (ref 70–130)
GLUCOSE SERPL-MCNC: 157 MG/DL (ref 65–99)
HCT VFR BLD AUTO: 30 % (ref 34–46.6)
HGB BLD-MCNC: 9.4 G/DL (ref 12–15.9)
IMM GRANULOCYTES # BLD AUTO: 0.06 10*3/MM3 (ref 0–0.05)
IMM GRANULOCYTES NFR BLD AUTO: 1 % (ref 0–0.5)
LAB AP CASE REPORT: NORMAL
LYMPHOCYTES # BLD AUTO: 1.75 10*3/MM3 (ref 0.7–3.1)
LYMPHOCYTES NFR BLD AUTO: 29.1 % (ref 19.6–45.3)
MCH RBC QN AUTO: 29.3 PG (ref 26.6–33)
MCHC RBC AUTO-ENTMCNC: 31.3 G/DL (ref 31.5–35.7)
MCV RBC AUTO: 93.5 FL (ref 79–97)
METHADONE UR QL SCN: NEGATIVE
MONOCYTES # BLD AUTO: 0.46 10*3/MM3 (ref 0.1–0.9)
MONOCYTES NFR BLD AUTO: 7.6 % (ref 5–12)
NEUTROPHILS NFR BLD AUTO: 3.5 10*3/MM3 (ref 1.7–7)
NEUTROPHILS NFR BLD AUTO: 58.2 % (ref 42.7–76)
NRBC BLD AUTO-RTO: 0.3 /100 WBC (ref 0–0.2)
OPIATES UR QL: NEGATIVE
OXYCODONE UR QL SCN: NEGATIVE
PATH REPORT.FINAL DX SPEC: NORMAL
PCP UR QL SCN: NEGATIVE
PLATELET # BLD AUTO: 217 10*3/MM3 (ref 140–450)
PMV BLD AUTO: 9 FL (ref 6–12)
POTASSIUM SERPL-SCNC: 5.1 MMOL/L (ref 3.5–5.2)
PROPOXYPH UR QL: NEGATIVE
RBC # BLD AUTO: 3.21 10*6/MM3 (ref 3.77–5.28)
SODIUM SERPL-SCNC: 138 MMOL/L (ref 136–145)
TRICYCLICS UR QL SCN: NEGATIVE
WBC # BLD AUTO: 6.02 10*3/MM3 (ref 3.4–10.8)

## 2021-05-19 PROCEDURE — 99232 SBSQ HOSP IP/OBS MODERATE 35: CPT | Performed by: INTERNAL MEDICINE

## 2021-05-19 PROCEDURE — 82962 GLUCOSE BLOOD TEST: CPT

## 2021-05-19 PROCEDURE — 94799 UNLISTED PULMONARY SVC/PX: CPT

## 2021-05-19 PROCEDURE — 85025 COMPLETE CBC W/AUTO DIFF WBC: CPT | Performed by: INTERNAL MEDICINE

## 2021-05-19 PROCEDURE — 80306 DRUG TEST PRSMV INSTRMNT: CPT | Performed by: STUDENT IN AN ORGANIZED HEALTH CARE EDUCATION/TRAINING PROGRAM

## 2021-05-19 PROCEDURE — 99232 SBSQ HOSP IP/OBS MODERATE 35: CPT | Performed by: STUDENT IN AN ORGANIZED HEALTH CARE EDUCATION/TRAINING PROGRAM

## 2021-05-19 PROCEDURE — 36415 COLL VENOUS BLD VENIPUNCTURE: CPT | Performed by: INTERNAL MEDICINE

## 2021-05-19 PROCEDURE — 63710000001 INSULIN ASPART PER 5 UNITS: Performed by: INTERNAL MEDICINE

## 2021-05-19 PROCEDURE — 63710000001 INSULIN DETEMIR PER 5 UNITS: Performed by: STUDENT IN AN ORGANIZED HEALTH CARE EDUCATION/TRAINING PROGRAM

## 2021-05-19 PROCEDURE — 94660 CPAP INITIATION&MGMT: CPT

## 2021-05-19 PROCEDURE — 94760 N-INVAS EAR/PLS OXIMETRY 1: CPT

## 2021-05-19 PROCEDURE — 25010000002 CEFTRIAXONE PER 250 MG: Performed by: STUDENT IN AN ORGANIZED HEALTH CARE EDUCATION/TRAINING PROGRAM

## 2021-05-19 PROCEDURE — 80048 BASIC METABOLIC PNL TOTAL CA: CPT | Performed by: STUDENT IN AN ORGANIZED HEALTH CARE EDUCATION/TRAINING PROGRAM

## 2021-05-19 RX ADMIN — NICOTINE 1 PATCH: 14 PATCH, EXTENDED RELEASE TRANSDERMAL at 09:45

## 2021-05-19 RX ADMIN — POTASSIUM CHLORIDE 10 MEQ: 750 CAPSULE, EXTENDED RELEASE ORAL at 09:44

## 2021-05-19 RX ADMIN — GABAPENTIN 800 MG: 400 CAPSULE ORAL at 20:55

## 2021-05-19 RX ADMIN — CEFTRIAXONE SODIUM 1 G: 1 INJECTION, POWDER, FOR SOLUTION INTRAMUSCULAR; INTRAVENOUS at 09:50

## 2021-05-19 RX ADMIN — SODIUM CHLORIDE, PRESERVATIVE FREE 10 ML: 5 INJECTION INTRAVENOUS at 09:42

## 2021-05-19 RX ADMIN — DOCUSATE SODIUM 50 MG AND SENNOSIDES 8.6 MG 2 TABLET: 8.6; 5 TABLET, FILM COATED ORAL at 09:42

## 2021-05-19 RX ADMIN — SODIUM BICARBONATE 650 MG: 650 TABLET ORAL at 09:46

## 2021-05-19 RX ADMIN — METOPROLOL TARTRATE 100 MG: 50 TABLET, FILM COATED ORAL at 09:42

## 2021-05-19 RX ADMIN — LIDOCAINE 1 PATCH: 50 PATCH CUTANEOUS at 09:44

## 2021-05-19 RX ADMIN — INSULIN DETEMIR 10 UNITS: 100 INJECTION, SOLUTION SUBCUTANEOUS at 20:58

## 2021-05-19 RX ADMIN — INSULIN ASPART 3 UNITS: 100 INJECTION, SOLUTION INTRAVENOUS; SUBCUTANEOUS at 09:41

## 2021-05-19 RX ADMIN — OXYBUTYNIN CHLORIDE 5 MG: 5 TABLET, EXTENDED RELEASE ORAL at 09:46

## 2021-05-19 RX ADMIN — IPRATROPIUM BROMIDE AND ALBUTEROL SULFATE 3 ML: 2.5; .5 SOLUTION RESPIRATORY (INHALATION) at 19:09

## 2021-05-19 RX ADMIN — SODIUM BICARBONATE 650 MG: 650 TABLET ORAL at 20:55

## 2021-05-19 RX ADMIN — PANTOPRAZOLE SODIUM 40 MG: 40 INJECTION, POWDER, FOR SOLUTION INTRAVENOUS at 06:04

## 2021-05-19 RX ADMIN — CLOPIDOGREL BISULFATE 75 MG: 75 TABLET ORAL at 09:44

## 2021-05-19 RX ADMIN — INSULIN ASPART 5 UNITS: 100 INJECTION, SOLUTION INTRAVENOUS; SUBCUTANEOUS at 17:37

## 2021-05-19 RX ADMIN — SODIUM CHLORIDE, PRESERVATIVE FREE 10 ML: 5 INJECTION INTRAVENOUS at 20:55

## 2021-05-19 RX ADMIN — IPRATROPIUM BROMIDE AND ALBUTEROL SULFATE 3 ML: 2.5; .5 SOLUTION RESPIRATORY (INHALATION) at 14:54

## 2021-05-19 RX ADMIN — PANTOPRAZOLE SODIUM 40 MG: 40 INJECTION, POWDER, FOR SOLUTION INTRAVENOUS at 17:38

## 2021-05-19 RX ADMIN — NYSTATIN: 100000 POWDER TOPICAL at 09:44

## 2021-05-19 RX ADMIN — IPRATROPIUM BROMIDE AND ALBUTEROL SULFATE 3 ML: 2.5; .5 SOLUTION RESPIRATORY (INHALATION) at 08:42

## 2021-05-19 RX ADMIN — DULOXETINE HYDROCHLORIDE 20 MG: 20 CAPSULE, DELAYED RELEASE ORAL at 09:46

## 2021-05-19 RX ADMIN — GABAPENTIN 800 MG: 400 CAPSULE ORAL at 06:04

## 2021-05-19 RX ADMIN — ATORVASTATIN CALCIUM 40 MG: 40 TABLET, FILM COATED ORAL at 09:44

## 2021-05-19 RX ADMIN — NYSTATIN: 100000 POWDER TOPICAL at 17:39

## 2021-05-19 RX ADMIN — GABAPENTIN 800 MG: 400 CAPSULE ORAL at 17:38

## 2021-05-19 RX ADMIN — SUCRALFATE 1 G: 1 TABLET ORAL at 09:44

## 2021-05-19 RX ADMIN — NYSTATIN: 100000 POWDER TOPICAL at 20:55

## 2021-05-19 RX ADMIN — INSULIN ASPART 10 UNITS: 100 INJECTION, SOLUTION INTRAVENOUS; SUBCUTANEOUS at 12:32

## 2021-05-19 RX ADMIN — ASPIRIN 81 MG: 81 TABLET, FILM COATED ORAL at 09:42

## 2021-05-19 RX ADMIN — INSULIN DETEMIR 10 UNITS: 100 INJECTION, SOLUTION SUBCUTANEOUS at 09:41

## 2021-05-19 RX ADMIN — MELATONIN 6 MG: at 20:55

## 2021-05-19 RX ADMIN — LISINOPRIL 10 MG: 5 TABLET ORAL at 09:43

## 2021-05-19 RX ADMIN — METOPROLOL TARTRATE 100 MG: 50 TABLET, FILM COATED ORAL at 20:54

## 2021-05-20 PROBLEM — J96.12 CHRONIC HYPERCAPNIC RESPIRATORY FAILURE: Status: ACTIVE | Noted: 2021-05-20

## 2021-05-20 LAB
ANION GAP SERPL CALCULATED.3IONS-SCNC: 6 MMOL/L (ref 5–15)
BASOPHILS # BLD AUTO: 0.05 10*3/MM3 (ref 0–0.2)
BASOPHILS NFR BLD AUTO: 0.8 % (ref 0–1.5)
BUN SERPL-MCNC: 31 MG/DL (ref 8–23)
BUN/CREAT SERPL: 16.7 (ref 7–25)
CALCIUM SPEC-SCNC: 9.1 MG/DL (ref 8.6–10.5)
CHLORIDE SERPL-SCNC: 105 MMOL/L (ref 98–107)
CO2 SERPL-SCNC: 26 MMOL/L (ref 22–29)
CREAT SERPL-MCNC: 1.86 MG/DL (ref 0.57–1)
DEPRECATED RDW RBC AUTO: 52.5 FL (ref 37–54)
EOSINOPHIL # BLD AUTO: 0.24 10*3/MM3 (ref 0–0.4)
EOSINOPHIL NFR BLD AUTO: 4 % (ref 0.3–6.2)
ERYTHROCYTE [DISTWIDTH] IN BLOOD BY AUTOMATED COUNT: 16.1 % (ref 12.3–15.4)
GFR SERPL CREATININE-BSD FRML MDRD: 27 ML/MIN/1.73
GLUCOSE BLDC GLUCOMTR-MCNC: 237 MG/DL (ref 70–130)
GLUCOSE BLDC GLUCOMTR-MCNC: 299 MG/DL (ref 70–130)
GLUCOSE BLDC GLUCOMTR-MCNC: 311 MG/DL (ref 70–130)
GLUCOSE BLDC GLUCOMTR-MCNC: 359 MG/DL (ref 70–130)
GLUCOSE SERPL-MCNC: 206 MG/DL (ref 65–99)
HCT VFR BLD AUTO: 29.5 % (ref 34–46.6)
HGB BLD-MCNC: 9 G/DL (ref 12–15.9)
IMM GRANULOCYTES # BLD AUTO: 0.08 10*3/MM3 (ref 0–0.05)
IMM GRANULOCYTES NFR BLD AUTO: 1.3 % (ref 0–0.5)
LYMPHOCYTES # BLD AUTO: 2.13 10*3/MM3 (ref 0.7–3.1)
LYMPHOCYTES NFR BLD AUTO: 35.5 % (ref 19.6–45.3)
MCH RBC QN AUTO: 27.9 PG (ref 26.6–33)
MCHC RBC AUTO-ENTMCNC: 30.5 G/DL (ref 31.5–35.7)
MCV RBC AUTO: 91.3 FL (ref 79–97)
MONOCYTES # BLD AUTO: 0.58 10*3/MM3 (ref 0.1–0.9)
MONOCYTES NFR BLD AUTO: 9.7 % (ref 5–12)
NEUTROPHILS NFR BLD AUTO: 2.92 10*3/MM3 (ref 1.7–7)
NEUTROPHILS NFR BLD AUTO: 48.7 % (ref 42.7–76)
NRBC BLD AUTO-RTO: 0.3 /100 WBC (ref 0–0.2)
PLATELET # BLD AUTO: 220 10*3/MM3 (ref 140–450)
PMV BLD AUTO: 8.6 FL (ref 6–12)
POTASSIUM SERPL-SCNC: 4.9 MMOL/L (ref 3.5–5.2)
RBC # BLD AUTO: 3.23 10*6/MM3 (ref 3.77–5.28)
SODIUM SERPL-SCNC: 137 MMOL/L (ref 136–145)
WBC # BLD AUTO: 6 10*3/MM3 (ref 3.4–10.8)

## 2021-05-20 PROCEDURE — 63710000001 INSULIN DETEMIR PER 5 UNITS: Performed by: STUDENT IN AN ORGANIZED HEALTH CARE EDUCATION/TRAINING PROGRAM

## 2021-05-20 PROCEDURE — 94799 UNLISTED PULMONARY SVC/PX: CPT

## 2021-05-20 PROCEDURE — 85025 COMPLETE CBC W/AUTO DIFF WBC: CPT | Performed by: INTERNAL MEDICINE

## 2021-05-20 PROCEDURE — 97161 PT EVAL LOW COMPLEX 20 MIN: CPT

## 2021-05-20 PROCEDURE — 63710000001 INSULIN ASPART PER 5 UNITS: Performed by: INTERNAL MEDICINE

## 2021-05-20 PROCEDURE — 82962 GLUCOSE BLOOD TEST: CPT

## 2021-05-20 PROCEDURE — 97166 OT EVAL MOD COMPLEX 45 MIN: CPT

## 2021-05-20 PROCEDURE — 80048 BASIC METABOLIC PNL TOTAL CA: CPT | Performed by: STUDENT IN AN ORGANIZED HEALTH CARE EDUCATION/TRAINING PROGRAM

## 2021-05-20 PROCEDURE — 94660 CPAP INITIATION&MGMT: CPT

## 2021-05-20 PROCEDURE — 99232 SBSQ HOSP IP/OBS MODERATE 35: CPT | Performed by: INTERNAL MEDICINE

## 2021-05-20 PROCEDURE — 36415 COLL VENOUS BLD VENIPUNCTURE: CPT | Performed by: INTERNAL MEDICINE

## 2021-05-20 PROCEDURE — 99232 SBSQ HOSP IP/OBS MODERATE 35: CPT | Performed by: STUDENT IN AN ORGANIZED HEALTH CARE EDUCATION/TRAINING PROGRAM

## 2021-05-20 PROCEDURE — 25010000002 CEFTRIAXONE PER 250 MG: Performed by: STUDENT IN AN ORGANIZED HEALTH CARE EDUCATION/TRAINING PROGRAM

## 2021-05-20 RX ADMIN — PANTOPRAZOLE SODIUM 40 MG: 40 INJECTION, POWDER, FOR SOLUTION INTRAVENOUS at 18:18

## 2021-05-20 RX ADMIN — INSULIN ASPART 8 UNITS: 100 INJECTION, SOLUTION INTRAVENOUS; SUBCUTANEOUS at 18:17

## 2021-05-20 RX ADMIN — INSULIN DETEMIR 10 UNITS: 100 INJECTION, SOLUTION SUBCUTANEOUS at 09:17

## 2021-05-20 RX ADMIN — GABAPENTIN 800 MG: 400 CAPSULE ORAL at 14:34

## 2021-05-20 RX ADMIN — NYSTATIN: 100000 POWDER TOPICAL at 20:10

## 2021-05-20 RX ADMIN — GABAPENTIN 800 MG: 400 CAPSULE ORAL at 21:08

## 2021-05-20 RX ADMIN — SUCRALFATE 1 G: 1 TABLET ORAL at 09:20

## 2021-05-20 RX ADMIN — IPRATROPIUM BROMIDE AND ALBUTEROL SULFATE 3 ML: 2.5; .5 SOLUTION RESPIRATORY (INHALATION) at 21:06

## 2021-05-20 RX ADMIN — PANTOPRAZOLE SODIUM 40 MG: 40 INJECTION, POWDER, FOR SOLUTION INTRAVENOUS at 05:52

## 2021-05-20 RX ADMIN — INSULIN DETEMIR 30 UNITS: 100 INJECTION, SOLUTION SUBCUTANEOUS at 21:09

## 2021-05-20 RX ADMIN — GABAPENTIN 800 MG: 400 CAPSULE ORAL at 05:51

## 2021-05-20 RX ADMIN — INSULIN ASPART 5 UNITS: 100 INJECTION, SOLUTION INTRAVENOUS; SUBCUTANEOUS at 09:23

## 2021-05-20 RX ADMIN — IPRATROPIUM BROMIDE AND ALBUTEROL SULFATE 3 ML: 2.5; .5 SOLUTION RESPIRATORY (INHALATION) at 08:11

## 2021-05-20 RX ADMIN — ATORVASTATIN CALCIUM 40 MG: 40 TABLET, FILM COATED ORAL at 09:20

## 2021-05-20 RX ADMIN — SUCRALFATE 1 G: 1 TABLET ORAL at 18:18

## 2021-05-20 RX ADMIN — DOCUSATE SODIUM 50 MG AND SENNOSIDES 8.6 MG 2 TABLET: 8.6; 5 TABLET, FILM COATED ORAL at 20:08

## 2021-05-20 RX ADMIN — SODIUM BICARBONATE 650 MG: 650 TABLET ORAL at 20:09

## 2021-05-20 RX ADMIN — POTASSIUM CHLORIDE 10 MEQ: 750 CAPSULE, EXTENDED RELEASE ORAL at 09:21

## 2021-05-20 RX ADMIN — INSULIN ASPART 12 UNITS: 100 INJECTION, SOLUTION INTRAVENOUS; SUBCUTANEOUS at 11:59

## 2021-05-20 RX ADMIN — NYSTATIN: 100000 POWDER TOPICAL at 09:22

## 2021-05-20 RX ADMIN — SUCRALFATE 1 G: 1 TABLET ORAL at 11:59

## 2021-05-20 RX ADMIN — ASPIRIN 81 MG: 81 TABLET, FILM COATED ORAL at 09:20

## 2021-05-20 RX ADMIN — NYSTATIN: 100000 POWDER TOPICAL at 18:18

## 2021-05-20 RX ADMIN — NICOTINE 1 PATCH: 14 PATCH, EXTENDED RELEASE TRANSDERMAL at 09:21

## 2021-05-20 RX ADMIN — IPRATROPIUM BROMIDE AND ALBUTEROL SULFATE 3 ML: 2.5; .5 SOLUTION RESPIRATORY (INHALATION) at 11:13

## 2021-05-20 RX ADMIN — SODIUM BICARBONATE 650 MG: 650 TABLET ORAL at 09:21

## 2021-05-20 RX ADMIN — IPRATROPIUM BROMIDE AND ALBUTEROL SULFATE 3 ML: 2.5; .5 SOLUTION RESPIRATORY (INHALATION) at 15:06

## 2021-05-20 RX ADMIN — OXYBUTYNIN CHLORIDE 5 MG: 5 TABLET, EXTENDED RELEASE ORAL at 09:20

## 2021-05-20 RX ADMIN — LISINOPRIL 10 MG: 5 TABLET ORAL at 09:20

## 2021-05-20 RX ADMIN — INSULIN ASPART 14 UNITS: 100 INJECTION, SOLUTION INTRAVENOUS; SUBCUTANEOUS at 21:13

## 2021-05-20 RX ADMIN — METOPROLOL TARTRATE 100 MG: 50 TABLET, FILM COATED ORAL at 09:20

## 2021-05-20 RX ADMIN — SUCRALFATE 1 G: 1 TABLET ORAL at 20:09

## 2021-05-20 RX ADMIN — SODIUM CHLORIDE, PRESERVATIVE FREE 10 ML: 5 INJECTION INTRAVENOUS at 21:09

## 2021-05-20 RX ADMIN — METOPROLOL TARTRATE 100 MG: 50 TABLET, FILM COATED ORAL at 20:08

## 2021-05-20 RX ADMIN — DULOXETINE HYDROCHLORIDE 20 MG: 20 CAPSULE, DELAYED RELEASE ORAL at 09:20

## 2021-05-20 RX ADMIN — SODIUM CHLORIDE, PRESERVATIVE FREE 10 ML: 5 INJECTION INTRAVENOUS at 09:22

## 2021-05-20 RX ADMIN — DOCUSATE SODIUM 50 MG AND SENNOSIDES 8.6 MG 2 TABLET: 8.6; 5 TABLET, FILM COATED ORAL at 09:20

## 2021-05-20 RX ADMIN — CEFTRIAXONE SODIUM 1 G: 1 INJECTION, POWDER, FOR SOLUTION INTRAMUSCULAR; INTRAVENOUS at 10:31

## 2021-05-20 RX ADMIN — CLOPIDOGREL BISULFATE 75 MG: 75 TABLET ORAL at 09:20

## 2021-05-21 ENCOUNTER — READMISSION MANAGEMENT (OUTPATIENT)
Dept: CALL CENTER | Facility: HOSPITAL | Age: 61
End: 2021-05-21

## 2021-05-21 ENCOUNTER — DOCUMENTATION (OUTPATIENT)
Dept: CARDIAC REHAB | Facility: HOSPITAL | Age: 61
End: 2021-05-21

## 2021-05-21 VITALS
BODY MASS INDEX: 52.19 KG/M2 | HEIGHT: 62 IN | DIASTOLIC BLOOD PRESSURE: 70 MMHG | WEIGHT: 283.6 LBS | RESPIRATION RATE: 18 BRPM | SYSTOLIC BLOOD PRESSURE: 136 MMHG | HEART RATE: 63 BPM | OXYGEN SATURATION: 92 % | TEMPERATURE: 97.4 F

## 2021-05-21 PROBLEM — J96.21 ACUTE ON CHRONIC RESPIRATORY FAILURE WITH HYPOXIA AND HYPERCAPNIA: Status: ACTIVE | Noted: 2021-05-13

## 2021-05-21 PROBLEM — J96.22 ACUTE ON CHRONIC RESPIRATORY FAILURE WITH HYPOXIA AND HYPERCAPNIA: Status: ACTIVE | Noted: 2021-05-13

## 2021-05-21 PROBLEM — J96.01 ACUTE RESPIRATORY FAILURE WITH HYPOXIA AND HYPERCAPNIA: Status: ACTIVE | Noted: 2021-05-13

## 2021-05-21 PROBLEM — J96.02 ACUTE RESPIRATORY FAILURE WITH HYPOXIA AND HYPERCAPNIA: Status: ACTIVE | Noted: 2021-05-13

## 2021-05-21 LAB
ANION GAP SERPL CALCULATED.3IONS-SCNC: 6 MMOL/L (ref 5–15)
BASOPHILS # BLD AUTO: 0.05 10*3/MM3 (ref 0–0.2)
BASOPHILS NFR BLD AUTO: 0.7 % (ref 0–1.5)
BUN SERPL-MCNC: 32 MG/DL (ref 8–23)
BUN/CREAT SERPL: 19.4 (ref 7–25)
CALCIUM SPEC-SCNC: 8.7 MG/DL (ref 8.6–10.5)
CHLORIDE SERPL-SCNC: 104 MMOL/L (ref 98–107)
CO2 SERPL-SCNC: 26 MMOL/L (ref 22–29)
CREAT SERPL-MCNC: 1.65 MG/DL (ref 0.57–1)
DEPRECATED RDW RBC AUTO: 51.8 FL (ref 37–54)
EOSINOPHIL # BLD AUTO: 0.23 10*3/MM3 (ref 0–0.4)
EOSINOPHIL NFR BLD AUTO: 3.3 % (ref 0.3–6.2)
ERYTHROCYTE [DISTWIDTH] IN BLOOD BY AUTOMATED COUNT: 16 % (ref 12.3–15.4)
GFR SERPL CREATININE-BSD FRML MDRD: 32 ML/MIN/1.73
GLUCOSE BLDC GLUCOMTR-MCNC: 263 MG/DL (ref 70–130)
GLUCOSE BLDC GLUCOMTR-MCNC: 429 MG/DL (ref 70–130)
GLUCOSE BLDC GLUCOMTR-MCNC: 431 MG/DL (ref 70–130)
GLUCOSE SERPL-MCNC: 236 MG/DL (ref 65–99)
HCT VFR BLD AUTO: 28.5 % (ref 34–46.6)
HGB BLD-MCNC: 9.2 G/DL (ref 12–15.9)
IMM GRANULOCYTES # BLD AUTO: 0.07 10*3/MM3 (ref 0–0.05)
IMM GRANULOCYTES NFR BLD AUTO: 1 % (ref 0–0.5)
LYMPHOCYTES # BLD AUTO: 2.2 10*3/MM3 (ref 0.7–3.1)
LYMPHOCYTES NFR BLD AUTO: 31.7 % (ref 19.6–45.3)
MCH RBC QN AUTO: 29 PG (ref 26.6–33)
MCHC RBC AUTO-ENTMCNC: 32.3 G/DL (ref 31.5–35.7)
MCV RBC AUTO: 89.9 FL (ref 79–97)
MONOCYTES # BLD AUTO: 0.55 10*3/MM3 (ref 0.1–0.9)
MONOCYTES NFR BLD AUTO: 7.9 % (ref 5–12)
NEUTROPHILS NFR BLD AUTO: 3.85 10*3/MM3 (ref 1.7–7)
NEUTROPHILS NFR BLD AUTO: 55.4 % (ref 42.7–76)
NRBC BLD AUTO-RTO: 0.3 /100 WBC (ref 0–0.2)
PLATELET # BLD AUTO: 185 10*3/MM3 (ref 140–450)
PMV BLD AUTO: 8.8 FL (ref 6–12)
POTASSIUM SERPL-SCNC: 4.2 MMOL/L (ref 3.5–5.2)
RBC # BLD AUTO: 3.17 10*6/MM3 (ref 3.77–5.28)
SODIUM SERPL-SCNC: 136 MMOL/L (ref 136–145)
WBC # BLD AUTO: 6.95 10*3/MM3 (ref 3.4–10.8)

## 2021-05-21 PROCEDURE — 63710000001 INSULIN DETEMIR PER 5 UNITS: Performed by: STUDENT IN AN ORGANIZED HEALTH CARE EDUCATION/TRAINING PROGRAM

## 2021-05-21 PROCEDURE — 99239 HOSP IP/OBS DSCHRG MGMT >30: CPT | Performed by: STUDENT IN AN ORGANIZED HEALTH CARE EDUCATION/TRAINING PROGRAM

## 2021-05-21 PROCEDURE — 63710000001 INSULIN ASPART PER 5 UNITS: Performed by: STUDENT IN AN ORGANIZED HEALTH CARE EDUCATION/TRAINING PROGRAM

## 2021-05-21 PROCEDURE — 94799 UNLISTED PULMONARY SVC/PX: CPT

## 2021-05-21 PROCEDURE — 97110 THERAPEUTIC EXERCISES: CPT

## 2021-05-21 PROCEDURE — 99231 SBSQ HOSP IP/OBS SF/LOW 25: CPT | Performed by: INTERNAL MEDICINE

## 2021-05-21 PROCEDURE — 97530 THERAPEUTIC ACTIVITIES: CPT

## 2021-05-21 PROCEDURE — 82962 GLUCOSE BLOOD TEST: CPT

## 2021-05-21 PROCEDURE — 85025 COMPLETE CBC W/AUTO DIFF WBC: CPT | Performed by: INTERNAL MEDICINE

## 2021-05-21 PROCEDURE — 97116 GAIT TRAINING THERAPY: CPT

## 2021-05-21 PROCEDURE — 25010000002 CEFTRIAXONE PER 250 MG: Performed by: STUDENT IN AN ORGANIZED HEALTH CARE EDUCATION/TRAINING PROGRAM

## 2021-05-21 PROCEDURE — 94660 CPAP INITIATION&MGMT: CPT

## 2021-05-21 PROCEDURE — 80048 BASIC METABOLIC PNL TOTAL CA: CPT | Performed by: STUDENT IN AN ORGANIZED HEALTH CARE EDUCATION/TRAINING PROGRAM

## 2021-05-21 PROCEDURE — 63710000001 INSULIN ASPART PER 5 UNITS: Performed by: INTERNAL MEDICINE

## 2021-05-21 RX ORDER — CEFDINIR 300 MG/1
300 CAPSULE ORAL 2 TIMES DAILY
Qty: 6 CAPSULE | Refills: 0 | Status: SHIPPED | OUTPATIENT
Start: 2021-05-21 | End: 2021-05-24

## 2021-05-21 RX ORDER — FERROUS SULFATE TAB EC 324 MG (65 MG FE EQUIVALENT) 324 (65 FE) MG
324 TABLET DELAYED RESPONSE ORAL
Status: DISCONTINUED | OUTPATIENT
Start: 2021-05-21 | End: 2021-05-21 | Stop reason: HOSPADM

## 2021-05-21 RX ADMIN — OXYBUTYNIN CHLORIDE 5 MG: 5 TABLET, EXTENDED RELEASE ORAL at 09:00

## 2021-05-21 RX ADMIN — INSULIN DETEMIR 30 UNITS: 100 INJECTION, SOLUTION SUBCUTANEOUS at 09:02

## 2021-05-21 RX ADMIN — POTASSIUM CHLORIDE 10 MEQ: 750 CAPSULE, EXTENDED RELEASE ORAL at 09:01

## 2021-05-21 RX ADMIN — NYSTATIN: 100000 POWDER TOPICAL at 09:04

## 2021-05-21 RX ADMIN — INSULIN ASPART 5 UNITS: 100 INJECTION, SOLUTION INTRAVENOUS; SUBCUTANEOUS at 09:03

## 2021-05-21 RX ADMIN — METOPROLOL TARTRATE 100 MG: 50 TABLET, FILM COATED ORAL at 09:01

## 2021-05-21 RX ADMIN — FERROUS SULFATE TAB EC 324 MG (65 MG FE EQUIVALENT) 324 MG: 324 (65 FE) TABLET DELAYED RESPONSE at 09:08

## 2021-05-21 RX ADMIN — CEFTRIAXONE SODIUM 1 G: 1 INJECTION, POWDER, FOR SOLUTION INTRAMUSCULAR; INTRAVENOUS at 15:22

## 2021-05-21 RX ADMIN — NYSTATIN: 100000 POWDER TOPICAL at 15:23

## 2021-05-21 RX ADMIN — LISINOPRIL 10 MG: 5 TABLET ORAL at 09:01

## 2021-05-21 RX ADMIN — IPRATROPIUM BROMIDE AND ALBUTEROL SULFATE 3 ML: 2.5; .5 SOLUTION RESPIRATORY (INHALATION) at 15:09

## 2021-05-21 RX ADMIN — PANTOPRAZOLE SODIUM 40 MG: 40 INJECTION, POWDER, FOR SOLUTION INTRAVENOUS at 06:00

## 2021-05-21 RX ADMIN — NICOTINE 1 PATCH: 14 PATCH, EXTENDED RELEASE TRANSDERMAL at 09:05

## 2021-05-21 RX ADMIN — GABAPENTIN 800 MG: 400 CAPSULE ORAL at 15:22

## 2021-05-21 RX ADMIN — DULOXETINE HYDROCHLORIDE 20 MG: 20 CAPSULE, DELAYED RELEASE ORAL at 09:01

## 2021-05-21 RX ADMIN — IPRATROPIUM BROMIDE AND ALBUTEROL SULFATE 3 ML: 2.5; .5 SOLUTION RESPIRATORY (INHALATION) at 11:27

## 2021-05-21 RX ADMIN — ASPIRIN 81 MG: 81 TABLET, FILM COATED ORAL at 09:01

## 2021-05-21 RX ADMIN — INSULIN ASPART 15 UNITS: 100 INJECTION, SOLUTION INTRAVENOUS; SUBCUTANEOUS at 10:35

## 2021-05-21 RX ADMIN — INSULIN ASPART 10 UNITS: 100 INJECTION, SOLUTION INTRAVENOUS; SUBCUTANEOUS at 10:34

## 2021-05-21 RX ADMIN — IPRATROPIUM BROMIDE AND ALBUTEROL SULFATE 3 ML: 2.5; .5 SOLUTION RESPIRATORY (INHALATION) at 07:43

## 2021-05-21 RX ADMIN — GABAPENTIN 800 MG: 400 CAPSULE ORAL at 06:00

## 2021-05-21 RX ADMIN — CLOPIDOGREL BISULFATE 75 MG: 75 TABLET ORAL at 09:06

## 2021-05-21 RX ADMIN — SODIUM CHLORIDE, PRESERVATIVE FREE 10 ML: 5 INJECTION INTRAVENOUS at 09:05

## 2021-05-21 RX ADMIN — INSULIN ASPART 15 UNITS: 100 INJECTION, SOLUTION INTRAVENOUS; SUBCUTANEOUS at 09:03

## 2021-05-21 RX ADMIN — SODIUM BICARBONATE 650 MG: 650 TABLET ORAL at 09:01

## 2021-05-21 RX ADMIN — ATORVASTATIN CALCIUM 40 MG: 40 TABLET, FILM COATED ORAL at 09:00

## 2021-05-21 RX ADMIN — DOCUSATE SODIUM 50 MG AND SENNOSIDES 8.6 MG 2 TABLET: 8.6; 5 TABLET, FILM COATED ORAL at 09:01

## 2021-05-22 ENCOUNTER — TRANSITIONAL CARE MANAGEMENT TELEPHONE ENCOUNTER (OUTPATIENT)
Dept: CALL CENTER | Facility: HOSPITAL | Age: 61
End: 2021-05-22

## 2021-05-22 LAB
GLUCOSE BLDC GLUCOMTR-MCNC: 225 MG/DL (ref 70–130)
GLUCOSE BLDC GLUCOMTR-MCNC: 340 MG/DL (ref 70–130)

## 2021-05-22 PROCEDURE — 82962 GLUCOSE BLOOD TEST: CPT

## 2021-05-22 NOTE — OUTREACH NOTE
Call Center TCM Note      Responses   [facility] patient discharged from?  [place]   Does the patient have one of the following disease processes/diagnoses(primary or secondary)?  COPD/Pneumonia   Was the primary reason for admission:  Pneumonia   TCM attempt successful?  Yes   Call start time  1609   Call end time  1613   Discharge diagnosis  GI bleed, acute hypoxic resp failure d/t PNA, T2DM, CKD   Meds reviewed with patient/caregiver?  Yes   Is the patient having any side effects they believe may be caused by any medication additions or changes?  No   Does the patient have all medications ordered at discharge?  Yes   Is the patient taking all medications as directed (includes completed medication regime)?  No   What is preventing the patient from taking all medications as directed?  Other [Not picked up at time of call. Pharmacy closed and will not reopen until Monday.]   Nursing Interventions  Nurse provided patient education   Does the patient have a primary care provider?   Yes   Does the patient have an appointment with their PCP or specialist within 7 days of discharge?  Yes   Comments regarding PCP  Hospital d/c f/u appt is on 5/25/21 at 10:15 am    Has the patient kept scheduled appointments due by today?  N/A   What is the Home health agency?   [facility]   Has home health visited the patient within 72 hours of discharge?  Call prior to 72 hours   Home health comments   should visit on 5/23/21   What DME was ordered?  O2 & trilogy from [facility] home oxygen   Has all DME been delivered?  Yes   Pulse Ox monitoring  Intermittent   Pulse Ox device source  Patient   O2 Sat comments  93-95% RA   Psychosocial issues?  No   Did the patient receive a copy of their discharge instructions?  Yes   Nursing interventions  Reviewed instructions with patient   What is the patient's perception of their health status since discharge?  Improving   Nursing Interventions  Nurse provided patient education   Are the  patient's immunizations up to date?   Yes   If the patient is a current smoker, are they able to teach back resources for cessation?  Not a smoker [Pt quit smoking on 4/23/21.]   Is the patient/caregiver able to teach back the hierarchy of who to call/visit for symptoms/problems? PCP, Specialist, Home health nurse, Urgent Care, ED, 911  Yes   Is the patient/caregiver able to teach back signs and symptoms of worsening condition:  Fever/chills, Shortness of breath, Chest pain   Is the patient/caregiver able to teach back importance of completing antibiotic course of treatment?  Yes   TCM call completed?  Yes          Nan Rodriguez RN    5/22/2021, 16:13 EDT

## 2021-05-24 NOTE — PAYOR COMM NOTE
"Caty Kennedy  [facility]  P: [phone]  F: [phone]    Ref#[id number]    Yamileth Slater (61 y.o. 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                 Active Insurance as of 5/13/2021     Primary Coverage     Payor Plan Insurance Group Employer/Plan Group    HUMANA MEDICARE REPLACEMENT HUMANA MEDICARE REPLACEMENT [id number]     Payor Plan Address Payor Plan Phone Number Payor Plan Fax Number Effective Dates    [address] [address] [phone]  7/1/2020 - None Entered    [facility] [address]       Subscriber Name Subscriber Birth Date Member ID       YAMILETH SLATER 1960 [id number]           Secondary Coverage     Payor Plan Insurance Group Employer/Plan Group    [place]      Payor Plan Address Payor Plan Phone Number Payor Plan Fax Number Effective Dates    [address] [phone]  6/28/2019 - None Entered    [facility] [address]       Subscriber Name Subscriber Birth Date Member ID       YAMILETH SLATER 1960 [id number]                 Emergency Contacts      (Rel.) Home Phone Work Phone Mobile Phone    Huy Slater (Spouse) [phone] -- [phone]    Yamileth Chavez (Daughter) [phone] -- " [phone]    Suzanne Rivera (Daughter) [phone] -- [phone]               Discharge Summary      Nirmal Calvillo MD at 21 [phone]     Attestation signed by Yadira Delarosa MD at 21 [phone]    I have seen and evaluated the patient on date of discharge.  I have discussed the case with the resident. I have reviewed the notes, assessment and plan, and/or procedures performed by the resident. I concur with the resident’s discharge summary.      Signature  [place] [address]  Office: [phone]    This document has been electronically signed by Yadira Delarosa MD on May 22, 2021 08:59 CDT                          DISCHARGE SUMMARY    PATIENT NAME: Yamileth Slater       PHYSICIAN: Nirmal Calvillo MD  : 1960  MRN: [id number]    ADMITTED: 2021     DISCHARGED: 21     ADMISSION DIAGNOSES:  Active Hospital Problems    Diagnosis  POA   • **Gastrointestinal hemorrhage [K92.2]  Yes   • Chronic hypercapnic respiratory failure (CMS/[facility]) [J96.12]  No   • Pneumonia due to infectious organism [J18.9]  Clinically Undetermined   • Acute respiratory failure with hypoxia (CMS/[facility]) [J96.01]  Yes   • CAD (coronary artery disease) [I25.10]  Yes   • Type 2 diabetes mellitus with hyperglycemia, with long-term current use of insulin (CMS/[facility]) [E11.65, Z79.4]  Not Applicable   • CKD (chronic kidney disease) stage 4, GFR 15-29 ml/min (CMS/[facility]) [N18.4]  Yes   • Dyslipidemia [E78.5]  Yes   • Essential hypertension [I10]  Yes   • GERD (gastroesophageal reflux disease) [K21.9]  Yes   • Morbid obesity with BMI of 50.0-59.9, adult (CMS/[facility]) [E66.01, Z68.43]  Not Applicable   • PAD (peripheral artery disease) (CMS/[facility]) [I73.9]  Yes      Resolved Hospital Problems   No resolved problems to display.     DISCHARGE DIAGNOSES:   Active Hospital Problems    Diagnosis  POA   • **Gastrointestinal hemorrhage [K92.2]  Yes   •  Chronic hypercapnic respiratory failure (CMS/[facility]) [J96.12]  No   • Pneumonia due to infectious organism [J18.9]  Clinically Undetermined   • Acute respiratory failure with hypoxia (CMS/[facility]) [J96.01]  Yes   • CAD (coronary artery disease) [I25.10]  Yes   • Type 2 diabetes mellitus with hyperglycemia, with long-term current use of insulin (CMS/[facility]) [E11.65, Z79.4]  Not Applicable   • CKD (chronic kidney disease) stage 4, GFR 15-29 ml/min (CMS/[facility]) [N18.4]  Yes   • Dyslipidemia [E78.5]  Yes   • Essential hypertension [I10]  Yes   • GERD (gastroesophageal reflux disease) [K21.9]  Yes   • Morbid obesity with BMI of 50.0-59.9, adult (CMS/[facility]) [E66.01, Z68.43]  Not Applicable   • PAD (peripheral artery disease) (CMS/[facility]) [I73.9]  Yes      Resolved Hospital Problems   No resolved problems to display.       SERVICE: Family Medicine Residency  Attending: Yadira Delarosa MD  Resident: Nirmal Calvillo MD    CONSULTS:   Consult Orders (all) (From admission, onward)     Start     Ordered    05/13/21 1449  Inpatient Gastroenterology Consult  Once     Specialty:  Gastroenterology  Provider:  Mingo Duarte MD    05/13/21 1449                PROCEDURES:   EGD: 5/14/2021  Colonoscopy: 5/14/2021  Capsule endoscopy: 5/14/2021    HISTORY OF PRESENT ILLNESS:   HPI as per Dr. Bells H&P on admission on 5/13/21:    Yamileth Slater is a 61 y.o. female with a CMH of CABG, type 2 diabetes mellitus, chronic kidney disease, anemia, COPD, tobacco abuse, GERD who presents with sudden onset shortness of breath.     Patient is s/p 3 cardiac stents placed 3 weeks ago in [facility].  Was placed on DAPT.  Patient was relatively stable and then as of, yesterday at 4pm she started becoming very short of breath.  She at baseline requires no home O2.     States she has had right leg pain due to her restless leg syndrome, and she had some right arm swelling that was thought to be due to cellulitis that has subsided.     Denies chest pain,  palpitations, pleuritic pain, cough, FC, hemoptysis, melena, or BRBPR.  States she has some abdominal soreness from having to use her stomach to breathe.    DIAGNOSTIC DATA:   Lab Results (last 24 hours)     Procedure Component Value Units Date/Time    POC Glucose Once [[id number]]  (Abnormal) Collected: 05/21/21 0633    Specimen: Blood Updated: 05/21/21 1047     Glucose 263 mg/dL      Comment: RN NotifiedOperator: [id number] RAMBO YAHAIRAYMeter ID: [id number]       POC Glucose Once [[id number]]  (Abnormal) Collected: 05/20/21 2041    Specimen: Blood Updated: 05/21/21 0854     Glucose 429 mg/dL      Comment: RN NotifiedOperator: [id number] ELVIE VILLALPANDODRAMeter ID: [id number]       POC Glucose Once [[id number]]  (Abnormal) Collected: 05/20/21 2040    Specimen: Blood Updated: 05/21/21 0854     Glucose 431 mg/dL      Comment: RN NotifiedOperator: [id number] ELVIE VILLALPANDODRAMeter ID: [id number]       Basic Metabolic Panel [[id number]]  (Abnormal) Collected: 05/21/21 0559    Specimen: Blood Updated: 05/21/21 0632     Glucose 236 mg/dL      BUN 32 mg/dL      Creatinine 1.65 mg/dL      Sodium 136 mmol/L      Potassium 4.2 mmol/L      Chloride 104 mmol/L      CO2 26.0 mmol/L      Calcium 8.7 mg/dL      eGFR Non African Amer 32 mL/min/1.73      BUN/Creatinine Ratio 19.4     Anion Gap 6.0 mmol/L     Narrative:      GFR Normal >60  Chronic Kidney Disease <60  Kidney Failure <15      CBC Auto Differential [[id number]]  (Abnormal) Collected: 05/21/21 0559    Specimen: Blood Updated: 05/21/21 0610     WBC 6.95 10*3/mm3      RBC 3.17 10*6/mm3      Hemoglobin 9.2 g/dL      Hematocrit 28.5 %      MCV 89.9 fL      MCH 29.0 pg      MCHC 32.3 g/dL      RDW 16.0 %      RDW-SD 51.8 fl      MPV 8.8 fL      Platelets 185 10*3/mm3      Neutrophil % 55.4 %      Lymphocyte % 31.7 %      Monocyte % 7.9 %      Eosinophil % 3.3 %      Basophil % 0.7 %      Immature Grans % 1.0 %      Neutrophils, Absolute 3.85 10*3/mm3      Lymphocytes, Absolute  2.20 10*3/mm3      Monocytes, Absolute 0.55 10*3/mm3      Eosinophils, Absolute 0.23 10*3/mm3      Basophils, Absolute 0.05 10*3/mm3      Immature Grans, Absolute 0.07 10*3/mm3      nRBC 0.3 /100 WBC         Imaging Results (Most Recent)     Procedure Component Value Units Date/Time    US Guided Vascular Access [[id number]] Collected: 05/17/21 1031     Updated: 05/17/21 1249    Narrative:      PROCEDURE: Ultrasound Guidance Vascular Access      Ordering physician(s): JESSICA MADERA    Clinical Indication: Venous access      Findings:    The vessel was sonographically evaluated and determined to be  patent. Concurrent realtime ultrasound was used to visualize  needle entry into the right basilic     vein    and a permanent  image was stored for permanent recording and reporting.         Impression:      Impression:     Successful uneventful US guided placement right basilic vein for  venous access   .    Electronically signed by:  Hugo Russo MD  5/17/2021 12:48 PM CDT  Workstation: [id number]    IR Insert Midline Without Port Pump 5 Plus [[id number]] Resulted: 05/17/21 1102     Updated: 05/17/21 1102    Narrative:      This procedure was auto-finalized with no dictation required.    CT Angiogram Chest [[id number]] Collected: 05/16/21 1550     Updated: 05/16/21 1635    Narrative:      CT angiogram of the chest with contrast    History: Shortness of breath. Elevated d-dimer.     Axial spiral scans of the chest were obtained  with  intravenous  contrast.  Coronal and sagittal reconstructions and both oblique  coronal MIPS obtained the same workstation.    This exam was performed according to our departmental  dose-optimization program, which includes automated exposure  control, adjustment of the mA and/or kV according to patient size  and/or use of iterative reconstruction technique.    DLP: 990.80    Comparison: March 14, 2016    Findings:  No pulmonary embolism.  No mediastinal hematoma.  Minimal right hilar and  mediastinal lymphadenopathy.  No axillary adenopathy.  No thoracic aortic aneurysm or dissection.  No pericardial effusion.  Coronary artery bypass.  Cardiomegaly and pulmonary vascular congestion.  Small bilateral pleural effusions, larger on the right.  Some associated compressive atelectasis in the right lower lobe.  Infiltrates or edema in each lower lobe.  Subsegmental infiltrates or edema inferiorly in each upper lobe.  No pneumothorax.    Scans of the upper abdomen are unremarkable.    No acute osseous abnormality.  Degenerative changes in the thoracic spine.      Impression:      Conclusion:  No Pulmonary Embolism.  Minimal right hilar and mediastinal lymphadenopathy.  Coronary artery bypass.  Cardiomegaly and pulmonary vascular congestion.  Small bilateral pleural effusions, larger on the right.  Some associated compressive atelectasis in the right lower lobe.  Infiltrates or edema in each lower lobe.  Subsegmental infiltrates or edema inferiorly in each upper lobe.    [id number]    Electronically signed by:  Jon Patricia MD  5/16/2021 4:34 PM CDT  Workstation: [id number]    XR Chest 1 View [[id number]] Collected: 05/13/21 1235     Updated: 05/13/21 1320    Narrative:      EXAM DESCRIPTION:     XR CHEST 1 VW    CLINICAL HISTORY:     61 years  Female  CHF /COPD Protocol    COMPARISON:     April 3, 2021    TECHNIQUE:     One view-AP portable radiograph the chest    FINDINGS:     The cardiac silhouette is enlarged. There is mild central  vascular congestion. There are patchy bilateral perihilar  infiltrates noted particularly on the right. The findings most  likely represent mild pulmonary edema. The distribution of the  opacities in the right lung however does correspond to the  anterior rib ends of the first, third, fourth and fifth ribs. If  this patient has had trauma and this could represent subacute  fractures of the ribs. A PA and lateral view would be helpful for  more definitive assessment.    There are no  pleural effusions.      Impression:          1. Cardiomegaly with mild central vascular congestion suggesting  mild pulmonary venous hypertension.  2. Subtle bilateral perihilar infiltrates particularly on the  right. Differential considerations include ulnar edema but could  also be related to an acute fracture of the anterior rib ends on  the right as above. Further evaluation with PA and lateral views  recommended for more definitive assessment.        Electronically signed by:  Hilda Orellana MD  5/13/2021 1:19 PM  CDT Workstation: [id number]          HOSPITAL COURSE:  Patient admitted for GI bleed suspected secondary to antiplatelet medication after recent multiple stent placement.  She received 2 units of PRBCs with good response of hemoglobin.  GI was consulted and endoscopy and colonoscopy did not reveal any active bleeding just internal hemorrhoids.  Therefore, capsule endoscopy was performed and revealed nonbleeding small bowel AVMs.  She was given iron supplements and hemoglobin remained stable for rest of admission.    During this time she had desaturation on room air requiring 4 L of nasal cannula.  Due to lack of improvement, ABG and CTA of her chest was performed and revealed hypercapnia, and possible pneumonia, respectively for which she was started on BiPAP, and appropriate antibiotics, respectively.  Respiratory status mildly improved patient still required oxygen.  Therefore home oxygen was obtained, and trilogy approved and set up at home.  Due to stable hemoglobin, and breathing with appropriate arrangements made to support respiratory system she was deemed safe for discharge with oral antibiotics, trilogy machine, and GI and PCP follow-up.    DISCHARGE CONDITION:   Stable    DISPOSITION:  Home or Self Care    DISCHARGE MEDICATIONS     Discharge Medications      New Medications      Instructions Start Date   cefdinir 300 MG capsule  Commonly known as: OMNICEF   300 mg, Oral, 2 Times Daily          Changes to Medications      Instructions Start Date   metoprolol tartrate 100 MG tablet  Commonly known as: LOPRESSOR  What changed: when to take this   100 mg, Oral, Every 12 Hours Scheduled         Continue These Medications      Instructions Start Date   Adult Aspirin Regimen 81 MG EC tablet  Generic drug: aspirin   81 mg, Oral, Daily      albuterol sulfate  (90 Base) MCG/ACT inhaler  Commonly known as: PROVENTIL HFA;VENTOLIN HFA;PROAIR HFA   2 puffs, Inhalation, Every 4 Hours PRN      atorvastatin 40 MG tablet  Commonly known as: LIPITOR   40 mg, Oral, Daily      cetirizine 10 MG tablet  Commonly known as: zyrTEC   10 mg, Oral, Daily      clopidogrel 75 MG tablet  Commonly known as: PLAVIX   75 mg, Oral, Daily      [employer] Blood Glucose Meter w/Device kit   1 each, Does not apply, 3 Times Daily      cyclobenzaprine 10 MG tablet  Commonly known as: FLEXERIL   10 mg, Oral, 2 Times Daily PRN      DULoxetine 20 MG capsule  Commonly known as: Cymbalta   20 mg, Oral, Daily      Easy Touch Pen Needles 31G X 8 MM misc  Generic drug: Insulin Pen Needle   No dose, route, or frequency recorded.      NovoFine 30G X 8 MM misc  Generic drug: Insulin Pen Needle   As directed 4 times daily      ferrous sulfate 325 (65 FE) MG tablet  Commonly known as: FeroSul   325 mg, Oral, Daily With Breakfast      FreeStyle Jade 2 [place] device   1 each, Does not apply, Continuous      FreeStyle Jade 2 Sensor misc   1 each, Does not apply, Every 14 Days      gabapentin 800 MG tablet  Commonly known as: NEURONTIN   800 mg, Oral, 3 Times Daily      glucose blood test strip   Use as instructed      isosorbide mononitrate 30 MG 24 hr tablet  Commonly known as: IMDUR   30 mg, Oral, Daily      Jardiance 25 MG tablet  Generic drug: Empagliflozin   25 mg, Oral, Daily      Lantus SoloStar 100 UNIT/ML injection pen  Generic drug: Insulin Glargine   Inject 30 Units under the skin into the appropriate area as directed Every 12 (Twelve) Hours  **100 day supply**      lidocaine 5 %  Commonly known as: LIDODERM   PLACE 1 PATCH ON THE SKIN AS DIRECTED BY PROVIDER DAILY. REMOVE & DISCARD PATCH WITHIN 12 HOURS OR AS DIRECTED BY MD      lisinopril 10 MG tablet  Commonly known as: PRINIVIL,ZESTRIL   10 mg, Oral, Daily      montelukast 10 MG tablet  Commonly known as: SINGULAIR   10 mg, Oral, Nightly      NovoLOG FlexPen 100 UNIT/ML solution pen-injector sc pen  Generic drug: insulin aspart   20 Units, Subcutaneous, 3 Times Daily With Meals      nystatin 578347 UNIT/GM powder  Commonly known as: MYCOSTATIN   Topical, 3 Times Daily      ondansetron ODT 4 MG disintegrating tablet  Commonly known as: Zofran ODT   4 mg, Translingual, Every 8 Hours PRN      oxybutynin XL 5 MG 24 hr tablet  Commonly known as: DITROPAN-XL   5 mg, Oral, Daily      pantoprazole 40 MG EC tablet  Commonly known as: PROTONIX   40 mg, Oral, Nightly      potassium chloride 10 MEQ CR capsule  Commonly known as: MICRO-K   10 mEq, Oral, Daily      promethazine 25 MG tablet  Commonly known as: PHENERGAN   25 mg, Oral, Every 6 Hours PRN      sodium bicarbonate 650 MG tablet   650 mg, Oral, 2 Times Daily      sucralfate 1 g tablet  Commonly known as: CARAFATE   1 g, Oral, 4 Times Daily         Stop These Medications    amoxicillin-clavulanate 875-125 MG per tablet  Commonly known as: Augmentin     azithromycin 250 MG tablet  Commonly known as: ZITHROMAX            INSTRUCTIONS:  Activity:   Activity Instructions     Activity as Tolerated          Diet:   Diet Instructions     Diet: Consistent Carbohydrate, Cardiac      Discharge Diet:  Consistent Carbohydrate  Cardiac             FOLLOW UP:   Additional Instructions for the Follow-ups that You Need to Schedule     Ambulatory Referral to Home Health   As directed      Face to Face Visit Date: 5/21/2021    Follow-up provider for Plan of Care?: I treated the patient in an acute care facility and will not continue treatment after discharge.    Follow-up  provider: ELIZABET LOPEZ [[id number]]    Reason/Clinical Findings: Physcial deconditioning. Acute respiratory failure    Describe mobility limitations that make leaving home difficult: Oxygen depepndent. Physical deconditioning    Nursing/Therapeutic Services Requested: Skilled Nursing Physical Therapy Occupational Therapy    Skilled nursing orders: Cardiopulmonary assessments    PT orders: Strengthening    Occupational orders: Activities of daily living    Frequency: 1 Week 1         Discharge Follow-up with PCP   As directed       Currently Documented PCP:    Elizabet Lopez MD    PCP Phone Number:    [phone]     Follow Up Details: 1 week            Contact information for follow-up providers     Elizabet Lopez MD. Go on 5/25/2021.    Specialties: Family Medicine, Emergency Medicine  Why: TUESDAY MAY 25TH 10:00 HOSPITAL FOLLOW UP APPOINTMENT  Contact information:  [address] [facility] [address] [phone]             Mingo Duarte MD Follow up in 2 week(s).    Specialty: Gastroenterology  Contact information:  [address] [place] [address] [address] BARBOSA FLRANDA  [facility] [address] [phone]             Elizabet Lopez MD .    Specialties: Family Medicine, Emergency Medicine  Contact information:  [address] [facility] [address] [phone]                   Contact information for after-discharge care     Durable Medical Equipment     [facility] .    Service: Durable Medical Equipment  Contact information:  [address]  [facility] [address] [phone]                 Home Medical Care     [facility] .    Service: Home Health Services  Contact information:  [address] [facility]   [place] [address] [phone]                             PENDING TEST RESULTS AT DISCHARGE      Time: >30 minutes were spent in discharge planning, medication reconciliation and coordination of care for this patient.    Yadira  MD Isaura is the attending at time of discharge, She is aware of the patient's status and agrees with the above discharge summary.          This document has been electronically signed by Nirmal Calvillo MD on May 22, 2021 08:56 CDT       Electronically signed by Leon Delarosa MD at 05/22/21 0859       Discharge Order (From admission, onward)     Start     Ordered    05/21/21 1547  Discharge patient  Once     Expected Discharge Date: 05/21/21    Expected Discharge Time: Afternoon    Discharge Disposition: Home or Self Care    Physician of Record for Attribution - Please select from Treatment Team: LEON DELAROSA [[id number]]    Review needed by CMO to determine Physician of Record: No       Question Answer Comment   Physician of Record for Attribution - Please select from Treatment Team LEON DELAROSA    Review needed by CMO to determine Physician of Record No        05/21/21 0440

## 2021-05-25 ENCOUNTER — OFFICE VISIT (OUTPATIENT)
Dept: FAMILY MEDICINE CLINIC | Facility: CLINIC | Age: 61
End: 2021-05-25

## 2021-05-25 VITALS
OXYGEN SATURATION: 99 % | DIASTOLIC BLOOD PRESSURE: 76 MMHG | TEMPERATURE: 97.8 F | SYSTOLIC BLOOD PRESSURE: 130 MMHG | HEART RATE: 62 BPM

## 2021-05-25 DIAGNOSIS — Z09 HOSPITAL DISCHARGE FOLLOW-UP: Primary | ICD-10-CM

## 2021-05-25 PROCEDURE — 1111F DSCHRG MED/CURRENT MED MERGE: CPT | Performed by: STUDENT IN AN ORGANIZED HEALTH CARE EDUCATION/TRAINING PROGRAM

## 2021-05-25 PROCEDURE — 99212 OFFICE O/P EST SF 10 MIN: CPT | Performed by: STUDENT IN AN ORGANIZED HEALTH CARE EDUCATION/TRAINING PROGRAM

## 2021-05-25 NOTE — PROGRESS NOTES
Family Medicine Residency  Nirmal Calvillo MD    Subjective:     Yamileth Slater is a 61 y.o. female who presents for hospital discharge follow-up.  She was admitted from May 13 2021 until May 21 2021 initially for GI bleed and then for respiratory failure.  Was evaluated by GI and no active bleed was discovered on endoscopy, colonoscopy, and capsule endoscopy.  She received 2 units of red blood cells and then had stable hemoglobin.  Hospital stay was complicated by respiratory failure for which she was discharged on home oxygen, and trilogy was obtained for.  She states she is significantly improved since hospital discharge.  She has noted dark tools once but is also taking iron.  No signs of hematuria or hematemesis.  She is using her trilogy nightly and states she feels rested.  Is on 2 L constantly, and is self weaning with home pulse ox.  Was unable to start antibiotics for pneumonia after hospital discharge over the weekend but has not taken them.    The following portions of the patient's history were reviewed and updated as appropriate: allergies, current medications, past family history, past medical history, past social history, past surgical history and problem list.    Past Medical Hx:  Past Medical History:   Diagnosis Date   • Anxiety    • Carotid artery stenosis    • Chronic obstructive lung disease (CMS/HCC)    • CKD (chronic kidney disease) stage 4, GFR 15-29 ml/min (CMS/HCC)    • Colonic polyp    • Coronary arteriosclerosis    • Diabetes mellitus (CMS/HCC)    • Diabetic neuropathy (CMS/HCC)    • GERD (gastroesophageal reflux disease)    • History of transfusion    • Hypercholesterolemia    • Hypertension    • Morbid obesity (CMS/HCC)    • Nephrolithiasis    • Peripheral vascular disease (CMS/HCC)    • Sleep apnea    • Substance abuse (CMS/HCC)    • Vitamin D deficiency        Past Surgical Hx:  Past Surgical History:   Procedure Laterality Date   • CARDIAC CATHETERIZATION N/A 7/14/2020      • CARDIAC CATHETERIZATION N/A 4/23/2021    Procedure: Left Heart Cath;  Surgeon: Melba Romo MD;  Location: [facility] CATH INVASIVE LOCATION;  Service: Cardiology;  Laterality: N/A;   • CARDIAC CATHETERIZATION N/A 4/30/2021    Procedure: Percutaneous Coronary Intervention;  Surgeon: Russell Voss MD;  Location: [facility] CATH INVASIVE LOCATION;  Service: Cardiovascular;  Laterality: N/A;   • CARDIAC CATHETERIZATION N/A 4/30/2021    Procedure: Stent NIKKI coronary;  Surgeon: Russell Voss MD;  Location: [facility] CATH INVASIVE LOCATION;  Service: Cardiovascular;  Laterality: N/A;   • CAROTID STENT Left    • COLONOSCOPY     • COLONOSCOPY N/A 5/14/2021    Procedure: COLONOSCOPY;  Surgeon: Mingo Duarte MD;  Location: [facility] ENDOSCOPY;  Service: Gastroenterology;  Laterality: N/A;   • CORONARY ARTERY BYPASS GRAFT     • CYSTOSCOPY BLADDER STONE LITHOTRIPSY Bilateral    • ENDOSCOPY N/A 4/12/2021    Procedure: ESOPHAGOGASTRODUODENOSCOPY;  Surgeon: Mingo Duarte MD;  Location: [facility] ENDOSCOPY;  Service: Gastroenterology;  Laterality: N/A;   • ENDOSCOPY N/A 5/14/2021    Procedure: ESOPHAGOGASTRODUODENOSCOPY;  Surgeon: Mingo Duarte MD;  Location: [facility] ENDOSCOPY;  Service: Gastroenterology;  Laterality: N/A;       Current Meds:    Current Outpatient Medications:   •  albuterol sulfate  (90 Base) MCG/ACT inhaler, Inhale 2 puffs Every 4 (Four) Hours As Needed for Wheezing., Disp: 18 g, Rfl: 1  •  aspirin (aspirin) 81 MG EC tablet, Take 1 tablet by mouth Daily., Disp: 60 tablet, Rfl: 5  •  atorvastatin (LIPITOR) 40 MG tablet, Take 1 tablet by mouth Daily. (Patient taking differently: Take 20 mg by mouth Daily.), Disp: 90 tablet, Rfl: 0  •  Blood Glucose Monitoring Suppl ([employer] Blood Glucose Meter) w/Device kit, 1 each 3 (Three) Times a Day., Disp: 1 kit, Rfl: 3  •  cetirizine (zyrTEC) 10 MG tablet, Take 1 tablet by mouth Daily., Disp: 30 tablet, Rfl: 3  •  clopidogrel (PLAVIX) 75 MG  tablet, Take 1 tablet by mouth Daily., Disp: 30 tablet, Rfl: 5  •  Continuous Blood Gluc  (FreeStyle Jaed 2 [place]) device, 1 each Continuous., Disp: 1 each, Rfl: 0  •  Continuous Blood Gluc Sensor (FreeStyle Jade 2 Sensor) misc, 1 each Every 14 (Fourteen) Days., Disp: 2 each, Rfl: 11  •  cyclobenzaprine (FLEXERIL) 10 MG tablet, Take 1 tablet by mouth 2 (Two) Times a Day As Needed for Muscle Spasms., Disp: 60 tablet, Rfl: 5  •  DULoxetine (Cymbalta) 20 MG capsule, Take 1 capsule by mouth Daily., Disp: 30 capsule, Rfl: 0  •  EASY TOUCH PEN NEEDLES 31G X 8 MM misc, , Disp: , Rfl:   •  Empagliflozin (Jardiance) 25 MG tablet, Take 25 mg by mouth Daily., Disp: 90 tablet, Rfl: 5  •  ferrous sulfate (FeroSul) 325 (65 FE) MG tablet, Take 1 tablet by mouth Daily With Breakfast., Disp: 30 tablet, Rfl: 3  •  gabapentin (NEURONTIN) 800 MG tablet, Take 1 tablet by mouth 3 (Three) Times a Day., Disp: 90 tablet, Rfl: 0  •  glucose blood test strip, Use as instructed, Disp: 100 each, Rfl: 12  •  insulin aspart (novoLOG FLEXPEN) 100 UNIT/ML solution pen-injector sc pen, Inject 20 Units under the skin into the appropriate area as directed 3 (Three) Times a Day With Meals., Disp: 15 mL, Rfl: 1  •  Insulin Glargine (Lantus SoloStar) 100 UNIT/ML injection pen, Inject 30 Units under the skin into the appropriate area as directed Every 12 (Twelve) Hours **100 day supply** (Patient taking differently: Inject 95 Units under the skin into the appropriate area as directed Every 12 (Twelve) Hours.), Disp: 60 mL, Rfl: 11  •  Insulin Pen Needle (NovoFine) 30G X 8 MM misc, As directed 4 times daily, Disp: 100 each, Rfl: 11  •  isosorbide mononitrate (IMDUR) 30 MG 24 hr tablet, Take 1 tablet by mouth Daily., Disp: 90 tablet, Rfl: 2  •  lidocaine (LIDODERM) 5 %, PLACE 1 PATCH ON THE SKIN AS DIRECTED BY PROVIDER DAILY. REMOVE & DISCARD PATCH WITHIN 12 HOURS OR AS DIRECTED BY MD, Disp: 30 patch, Rfl: 3  •  lisinopril (PRINIVIL,ZESTRIL) 10  MG tablet, Take 1 tablet by mouth Daily., Disp: 30 tablet, Rfl: 5  •  montelukast (SINGULAIR) 10 MG tablet, Take 1 tablet by mouth Every Night., Disp: 30 tablet, Rfl: 5  •  nystatin (MYCOSTATIN) 951329 UNIT/GM powder, Apply  topically to the appropriate area as directed 3 (Three) Times a Day., Disp: 15 g, Rfl: 1  •  ondansetron ODT (Zofran ODT) 4 MG disintegrating tablet, Place 1 tablet on the tongue Every 8 (Eight) Hours As Needed for Nausea or Vomiting., Disp: 30 tablet, Rfl: 0  •  oxybutynin XL (DITROPAN-XL) 5 MG 24 hr tablet, Take 1 tablet by mouth Daily., Disp: 90 tablet, Rfl: 1  •  pantoprazole (PROTONIX) 40 MG EC tablet, Take 1 tablet by mouth Every Night., Disp: 30 tablet, Rfl: 5  •  potassium chloride (MICRO-K) 10 MEQ CR capsule, Take 1 capsule by mouth Daily., Disp: 30 capsule, Rfl: 5  •  promethazine (PHENERGAN) 25 MG tablet, Take 1 tablet by mouth Every 6 (Six) Hours As Needed for Nausea or Vomiting., Disp: 30 tablet, Rfl: 0  •  sodium bicarbonate 650 MG tablet, Take 1 tablet by mouth 2 (Two) Times a Day., Disp: 60 tablet, Rfl: 1  •  sucralfate (CARAFATE) 1 g tablet, Take 1 tablet by mouth 4 (Four) Times a Day., Disp: 120 tablet, Rfl: 3  •  budesonide-formoterol (SYMBICORT) 160-4.5 MCG/ACT inhaler, Inhale 2 puffs 2 (Two) Times a Day., Disp: , Rfl:   •  buPROPion SR (Wellbutrin SR) 150 MG 12 hr tablet, Take 150 mg by mouth 2 (Two) Times a Day., Disp: , Rfl:   •  ipratropium-albuterol (DUO-NEB) 0.5-2.5 mg/3 ml nebulizer, Take 3 mL by nebulization 4 (Four) Times a Day., Disp: , Rfl:   •  metoprolol tartrate (LOPRESSOR) 100 MG tablet, Take 1 tablet by mouth Every 12 (Twelve) Hours for 30 days. (Patient taking differently: Take 100 mg by mouth 2 (Two) Times a Day.), Disp: 60 tablet, Rfl: 0  •  metoprolol tartrate (LOPRESSOR) 50 MG tablet, Take 50 mg by mouth 2 (Two) Times a Day., Disp: , Rfl:   •  nitroglycerin (NITROSTAT) 0.4 MG SL tablet, Place 0.4 mg under the tongue Every 5 (Five) Minutes As Needed for  Chest Pain (x 3 doses)., Disp: , Rfl:     Allergies:  Allergies   Allergen Reactions   • Adhesive Tape Hives   • Other      Bandaids, MRSA, SURECLOSE       Family Hx:  Family History   Problem Relation Age of Onset   • Heart disease Mother    • Heart disease Father    • Heart disease Sister    • Heart disease Brother         Social History:  Social History     Socioeconomic History   • Marital status:      Spouse name: wesley dumont   • Number of children: Not on file   • Years of education: Not on file   • Highest education level: Not on file   Tobacco Use   • Smoking status: Light Tobacco Smoker     Packs/day: 0.25     Years: 46.00     Pack years: 11.50     Types: Cigarettes   • Smokeless tobacco: Never Used   • Tobacco comment: only smoking 5 a day    Substance and Sexual Activity   • Alcohol use: No   • Drug use: Not Currently     Types: LSD, Marijuana, Methamphetamines   • Sexual activity: Not Currently       Review of Systems  Review of Systems   Constitutional: Negative for activity change and appetite change.   HENT: Negative for congestion and trouble swallowing.    Respiratory: Negative for chest tightness and shortness of breath.    Cardiovascular: Negative for chest pain and palpitations.   Gastrointestinal: Negative for abdominal distention and abdominal pain.   Genitourinary: Negative for difficulty urinating and dysuria.   Musculoskeletal: Negative for arthralgias and myalgias.   Skin: Negative for color change and pallor.   Neurological: Negative for dizziness, light-headedness and headaches.   Psychiatric/Behavioral: Negative for agitation and behavioral problems.       Objective:     /76   Pulse 62   Temp 97.8 °F (36.6 °C)   LMP  (LMP Unknown)   SpO2 99% Comment: on 2L  Physical Exam  Constitutional:       General: She is not in acute distress.     Appearance: She is well-developed.   HENT:      Head: Normocephalic and atraumatic.      Right Ear: External ear normal.      Left Ear:  External ear normal.      Nose: Nose normal.   Eyes:      Extraocular Movements: Extraocular movements intact.      Pupils: Pupils are equal, round, and reactive to light.   Cardiovascular:      Rate and Rhythm: Normal rate and regular rhythm.      Heart sounds: Normal heart sounds. No murmur heard.   No friction rub. No gallop.    Pulmonary:      Effort: Pulmonary effort is normal. No respiratory distress.      Breath sounds: Normal breath sounds. No wheezing or rales.   Abdominal:      General: Bowel sounds are normal. There is no distension.      Palpations: Abdomen is soft.      Tenderness: There is no abdominal tenderness.   Musculoskeletal:         General: Normal range of motion.      Cervical back: Normal range of motion and neck supple.      Right lower leg: No edema.      Left lower leg: No edema.   Skin:     General: Skin is warm.      Findings: No erythema.   Neurological:      Mental Status: She is alert and oriented to person, place, and time. Mental status is at baseline.   Psychiatric:         Mood and Affect: Mood normal.         Behavior: Behavior normal.          Assessment/Plan:     Diagnoses and all orders for this visit:    1. Hospital discharge follow-up (Primary)    Patient's respiratory status is stable, and there are no signs of bleeding.  We will follow-up with patient in a few days.  Routine visit for other chronic medical problems.  Will wait until that visit for repeat blood work just in case we need more labs for other medical conditions.  She has stopped smoking.  Continue trilogy, antibiotics.  Continue to monitor for any signs of bleeding.    · Rx changes: None  · Patient Education: Continue to monitor for bleeding, and respiratory status  · Compliance at present is estimated to be excellent.   · Efforts to improve compliance (if necessary) will be directed at Unnecessary at this time.     Follow-up:     Return for Next scheduled follow up.    Preventative:  Health Maintenance      Topic Date Due   • COVID-19 Vaccine (1) Never done   • ZOSTER VACCINE (1 of 2) Never done   • ANNUAL WELLNESS VISIT  Never done   • DIABETIC EYE EXAM  Never done   • URINE MICROALBUMIN  10/05/2018   • DIABETIC FOOT EXAM  12/26/2019   • PAP SMEAR  01/04/2020   • LIPID PANEL  06/24/2021   • INFLUENZA VACCINE  08/01/2021   • HEMOGLOBIN A1C  10/24/2021   • MAMMOGRAM  01/10/2022   • TDAP/TD VACCINES (3 - Td or Tdap) 11/10/2024   • COLORECTAL CANCER SCREENING  05/14/2026   • HEPATITIS C SCREENING  Completed   • Pneumococcal Vaccine 0-64  Completed       Recommended: none  Vaccine Counseling: N/A    Weight  -Class: Obese Class III extreme obesity: > or equal to 40kg/m2  -Patient's There is no height or weight on file to calculate BMI. BMI is above normal parameters. Recommendations include: exercise counseling and nutrition counseling.   eat more fruits and vegetables, decrease soda or juice intake, increase water intake, increase physical activity, reduce screen time and reduce portion size    Alcohol use:  reports no history of alcohol use.  Nicotine status  reports that she has been smoking cigarettes. She has a 11.50 pack-year smoking history. She has never used smokeless tobacco.    Goals     • Fasting Blood Glucose       Barriers: compliance with diet      • Quit smoking / using tobacco           RISK SCORE: 3          This document has been electronically signed by Nirmal Calvillo MD on May 25, 2021 17:45 CDT

## 2021-05-26 NOTE — DISCHARGE PLACEMENT REQUEST
"Yamileth Slater (61 y.o. Female)     Date of Birth Social Security Number Address Home Phone MRN    1960  [address]  [facility] [address] [phone] [id number]    Sabianist Marital Status          None        Admission Date Admission Type Admitting Provider Attending Provider Department, Room/Bed    5/13/21 Emergency Yadira Delarosa MD  [address], 323/1    Discharge Date Discharge Disposition Discharge Destination        5/21/2021 Home-Health Care Svc              Attending Provider: (none)   Allergies: Adhesive Tape, Other    Isolation: None   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                 Active Insurance as of 5/13/2021     Primary Coverage     Payor Plan Insurance Group Employer/Plan Group    HUMANA MEDICARE REPLACEMENT HUMANA MEDICARE REPLACEMENT [id number]     Payor Plan Address Payor Plan Phone Number Payor Plan Fax Number Effective Dates    [address] [address] [phone]  7/1/2020 - None Entered    [facility] [address]       Subscriber Name Subscriber Birth Date Member ID       YAMILETH SLATER 1960 [id number]           Secondary Coverage     Payor Plan Insurance Group Employer/Plan Group    [place]      Payor Plan Address Payor Plan Phone Number Payor Plan Fax Number Effective Dates    [address] [phone]  6/28/2019 - None Entered    [facility] [id number]       Subscriber Name Subscriber Birth Date Member ID       YAMILETH SLATRE 1960 [id number]                 Emergency Contacts      (Rel.) Home Phone Work Phone Mobile Phone    Huy Slater (Spouse) [phone] -- [phone]    Yamileth Chavez (Daughter) [phone] -- [phone]    MiguelJjSuzanne (Daughter) [phone] -- [phone]            Insurance " Information                HUMANA MEDICARE REPLACEMENT/HUMANA MEDICARE REPLACEMENT Phone: [phone]    Subscriber: Yamileth Slater Subscriber#: [id number]    Group#: [id number] Precert#:         [place] Phone: [phone]    Subscriber: Yamileth Slater Subscriber#: [id number]    Group#:  Precert#:         Oxygen Therapy [EQ60] (Order [id number])  Order  Discontinued  Date: 5/21/2021 Department: [address] Ordering/Authorizing: Florentino Altamirano MD   This Order Has Been Canceled    Order Status Reason By On   Canceled None Florentino Altamirano MD 5/21/21 1423          Order History  Outpatient  Date/Time Action Taken User Additional Information   05/21/21 1339 Sign Florentino Altamirano MD    05/21/21 1423 Do Not Order at Discharge Florentino Altamirano MD    05/21/21 1423 Modify at Discharge/Discontinue Florentino Altamirano MD To Order:[id number]   Order Details    Frequency Duration Priority Order Class   None None Routine External   Start Date/Time    Start Date   05/21/21   Order Information    Order Date Service Start Date Start Time   05/21/21 Emergency Medicine 05/21/21    Comments              Reference Links    Associated Reports   View Encounter   Order Questions    Question Answer Comment   Delivery Modality Nasal Cannula    Liters Per Minute: 3    Duration: Continuous    Equipment Portable Oxygen Concentrator    Length of Need (99 Months = Lifetime) 3 Months    Note:  Custom values to enter length of need for DME          Source Order Set / Preference List    Preference List   AMB SUPPLIES [1416]   Clinical Indications     ICD-10-CM ICD-9-CM   Acute respiratory failure with hypoxia and hypercapnia (CMS/HCC)     J96.01  J96.02 518.81   Reprint Order Requisition    Oxygen Therapy (Order #[id number]) on 5/21/21   Encounter    View Encounter          Order Provider Info        Office phone Pager E-mail   Ordering User Florentino Altamirano [phone] -- --    Authorizing Provider Florentino Altamirano [phone] -- --   Attending Provider When Ordered Nirmal Calvillo [phone] -- --   Linked Charges    No charges linked to this procedure   Tracking Reports  Cosign Tracking Order Transmittal Tracking   Authorized by:  Florentino Altamirano MD  (NPI: [id number])       Lab Component SmartPhrase Guide    Oxygen Therapy (Order #[id number]) on 5/21/21 05/21/21 1442 05/21/21 1443   Oxygen Therapy   SpO2 (!) 87 %  (at rest) 94 %  (recovering on 3L at rest )   Device (Oxygen Therapy) room air nasal cannula   Flow (L/min)  --  3

## 2021-05-26 NOTE — PROGRESS NOTES
I have helped formulate and discussed the assessment and plan with Dr.Richard Calvillo.    I have spoken with the patient .   I have reviewed the notes, assessments, and/or procedures performed by Dr. Nirmal Calvillo, I concur with his  documentation and assessment and plan for Yamilethjameson SmithNga Bryon.          This document has been electronically signed by Helder Lee MD on May 26, 2021 10:06 CDT

## 2021-05-27 ENCOUNTER — TRANSCRIBE ORDERS (OUTPATIENT)
Dept: LAB | Facility: HOSPITAL | Age: 61
End: 2021-05-27

## 2021-05-27 DIAGNOSIS — N18.4 CKD (CHRONIC KIDNEY DISEASE), STAGE IV (HCC): ICD-10-CM

## 2021-05-27 DIAGNOSIS — N17.0 ACUTE KIDNEY FAILURE WITH LESION OF TUBULAR NECROSIS (HCC): Primary | ICD-10-CM

## 2021-05-28 ENCOUNTER — LAB (OUTPATIENT)
Dept: LAB | Facility: HOSPITAL | Age: 61
End: 2021-05-28

## 2021-05-28 DIAGNOSIS — N18.4 CKD (CHRONIC KIDNEY DISEASE), STAGE IV (HCC): ICD-10-CM

## 2021-05-28 DIAGNOSIS — IMO0002 UNCONTROLLED TYPE 2 DIABETES MELLITUS WITH DIABETIC POLYNEUROPATHY, WITH LONG-TERM CURRENT USE OF INSULIN: ICD-10-CM

## 2021-05-28 DIAGNOSIS — N17.0 ACUTE KIDNEY FAILURE WITH LESION OF TUBULAR NECROSIS (HCC): ICD-10-CM

## 2021-05-28 LAB
ALBUMIN SERPL-MCNC: 3.3 G/DL (ref 3.5–5.2)
ALBUMIN/GLOB SERPL: 0.9 G/DL
ALP SERPL-CCNC: 116 U/L (ref 39–117)
ALT SERPL W P-5'-P-CCNC: 10 U/L (ref 1–33)
ANION GAP SERPL CALCULATED.3IONS-SCNC: 10.4 MMOL/L (ref 5–15)
AST SERPL-CCNC: 16 U/L (ref 1–32)
BASOPHILS # BLD MANUAL: 0.08 10*3/MM3 (ref 0–0.2)
BASOPHILS NFR BLD AUTO: 1 % (ref 0–1.5)
BILIRUB SERPL-MCNC: 0.2 MG/DL (ref 0–1.2)
BUN SERPL-MCNC: 37 MG/DL (ref 8–23)
BUN/CREAT SERPL: 17.9 (ref 7–25)
CALCIUM SPEC-SCNC: 7.4 MG/DL (ref 8.6–10.5)
CHLORIDE SERPL-SCNC: 106 MMOL/L (ref 98–107)
CO2 SERPL-SCNC: 23.6 MMOL/L (ref 22–29)
CREAT SERPL-MCNC: 2.07 MG/DL (ref 0.57–1)
DEPRECATED RDW RBC AUTO: 51.4 FL (ref 37–54)
EOSINOPHIL # BLD MANUAL: 0.15 10*3/MM3 (ref 0–0.4)
EOSINOPHIL NFR BLD MANUAL: 2 % (ref 0.3–6.2)
ERYTHROCYTE [DISTWIDTH] IN BLOOD BY AUTOMATED COUNT: 15.8 % (ref 12.3–15.4)
GFR SERPL CREATININE-BSD FRML MDRD: 24 ML/MIN/1.73
GLOBULIN UR ELPH-MCNC: 3.7 GM/DL
GLUCOSE SERPL-MCNC: 87 MG/DL (ref 65–99)
HCT VFR BLD AUTO: 29.5 % (ref 34–46.6)
HGB BLD-MCNC: 9.2 G/DL (ref 12–15.9)
LYMPHOCYTES # BLD MANUAL: 1.7 10*3/MM3 (ref 0.7–3.1)
LYMPHOCYTES NFR BLD MANUAL: 1 % (ref 5–12)
LYMPHOCYTES NFR BLD MANUAL: 22 % (ref 19.6–45.3)
MCH RBC QN AUTO: 28 PG (ref 26.6–33)
MCHC RBC AUTO-ENTMCNC: 31.2 G/DL (ref 31.5–35.7)
MCV RBC AUTO: 89.9 FL (ref 79–97)
MONOCYTES # BLD AUTO: 0.08 10*3/MM3 (ref 0.1–0.9)
NEUTROPHILS # BLD AUTO: 5.73 10*3/MM3 (ref 1.7–7)
NEUTROPHILS NFR BLD MANUAL: 74 % (ref 42.7–76)
PHOSPHATE SERPL-MCNC: 5.4 MG/DL (ref 2.5–4.5)
PLAT MORPH BLD: NORMAL
PLATELET # BLD AUTO: 260 10*3/MM3 (ref 140–450)
PMV BLD AUTO: 10.1 FL (ref 6–12)
POLYCHROMASIA BLD QL SMEAR: ABNORMAL
POTASSIUM SERPL-SCNC: 4.9 MMOL/L (ref 3.5–5.2)
PROT SERPL-MCNC: 7 G/DL (ref 6–8.5)
RBC # BLD AUTO: 3.28 10*6/MM3 (ref 3.77–5.28)
SODIUM SERPL-SCNC: 140 MMOL/L (ref 136–145)
WBC # BLD AUTO: 7.74 10*3/MM3 (ref 3.4–10.8)
WBC MORPH BLD: NORMAL

## 2021-05-28 PROCEDURE — 84100 ASSAY OF PHOSPHORUS: CPT

## 2021-05-28 PROCEDURE — 85027 COMPLETE CBC AUTOMATED: CPT

## 2021-05-28 PROCEDURE — 80053 COMPREHEN METABOLIC PANEL: CPT

## 2021-05-28 PROCEDURE — 85007 BL SMEAR W/DIFF WBC COUNT: CPT

## 2021-05-28 PROCEDURE — 36415 COLL VENOUS BLD VENIPUNCTURE: CPT

## 2021-05-28 RX ORDER — GABAPENTIN 800 MG/1
800 TABLET ORAL 3 TIMES DAILY
Qty: 90 TABLET | Refills: 0 | Status: SHIPPED | OUTPATIENT
Start: 2021-05-28 | End: 2021-08-13 | Stop reason: SDUPTHER

## 2021-05-28 NOTE — PROGRESS NOTES
Mamadou reviewed and appropriate, request# [id number]. UDS is up to date. Gabapentin 800mg TID refilled.          This document has been electronically signed by Nirmal Calvillo MD on May 28, 2021 17:01 CDT

## 2021-05-31 LAB
QT INTERVAL: 450 MS
QTC INTERVAL: 486 MS

## 2021-06-01 ENCOUNTER — TELEPHONE (OUTPATIENT)
Dept: FAMILY MEDICINE CLINIC | Facility: CLINIC | Age: 61
End: 2021-06-01

## 2021-06-01 NOTE — TELEPHONE ENCOUNTER
[facility] CALLED TO LET US KNOW THAT PATIENTS 02 WAS SET JUST ABOVE 2 LITERS WHEN SHE GOT THERE, AND HER 02 WAS AT AN 83.  THEY WERE UNABLE TO GET HER ABOVE TH 90 ON THE 2 LITERS, THEY MOVED HER 02 UP TO 3. SHE IS UNSURE IF PATIENT WILL BE ABLE TO HANDLE THE 2 LITERS ANY LONGER.    FOR QUESTIONS PLEASE CALL [facility] [phone].    THANKS,  TASHI

## 2021-06-02 ENCOUNTER — READMISSION MANAGEMENT (OUTPATIENT)
Dept: CALL CENTER | Facility: HOSPITAL | Age: 61
End: 2021-06-02

## 2021-06-02 ENCOUNTER — HOSPITAL ENCOUNTER (INPATIENT)
Facility: HOSPITAL | Age: 61
LOS: 7 days | Discharge: HOME HEALTH | End: 2021-06-09
Attending: EMERGENCY MEDICINE | Admitting: FAMILY MEDICINE

## 2021-06-02 ENCOUNTER — APPOINTMENT (OUTPATIENT)
Dept: GENERAL RADIOLOGY | Facility: HOSPITAL | Age: 61
End: 2021-06-02

## 2021-06-02 ENCOUNTER — TELEPHONE (OUTPATIENT)
Dept: FAMILY MEDICINE CLINIC | Facility: CLINIC | Age: 61
End: 2021-06-02

## 2021-06-02 DIAGNOSIS — E87.5 HYPERKALEMIA: ICD-10-CM

## 2021-06-02 DIAGNOSIS — Z78.9 DECREASED ACTIVITIES OF DAILY LIVING (ADL): ICD-10-CM

## 2021-06-02 DIAGNOSIS — E87.29 RESPIRATORY ACIDOSIS: ICD-10-CM

## 2021-06-02 DIAGNOSIS — IMO0002 UNCONTROLLED TYPE 2 DIABETES MELLITUS WITH DIABETIC POLYNEUROPATHY, WITH LONG-TERM CURRENT USE OF INSULIN: ICD-10-CM

## 2021-06-02 DIAGNOSIS — J18.9 PNEUMONIA OF BOTH LOWER LOBES DUE TO INFECTIOUS ORGANISM: Primary | ICD-10-CM

## 2021-06-02 DIAGNOSIS — J96.21 ACUTE ON CHRONIC RESPIRATORY FAILURE WITH HYPOXIA (HCC): ICD-10-CM

## 2021-06-02 PROBLEM — N17.9 ACUTE KIDNEY INJURY SUPERIMPOSED ON CHRONIC KIDNEY DISEASE: Status: ACTIVE | Noted: 2020-10-05

## 2021-06-02 PROBLEM — J81.0 ACUTE PULMONARY EDEMA (HCC): Status: ACTIVE | Noted: 2021-06-02

## 2021-06-02 PROBLEM — J96.22 ACUTE ON CHRONIC RESPIRATORY FAILURE WITH HYPOXIA AND HYPERCAPNIA (HCC): Status: ACTIVE | Noted: 2021-06-02

## 2021-06-02 LAB
A-A DO2: 27.6 MMHG
ALBUMIN SERPL-MCNC: 3.6 G/DL (ref 3.5–5.2)
ALBUMIN/GLOB SERPL: 0.9 G/DL
ALP SERPL-CCNC: 129 U/L (ref 39–117)
ALT SERPL W P-5'-P-CCNC: 11 U/L (ref 1–33)
ANION GAP SERPL CALCULATED.3IONS-SCNC: 6 MMOL/L (ref 5–15)
ARTERIAL PATENCY WRIST A: ABNORMAL
AST SERPL-CCNC: 14 U/L (ref 1–32)
ATMOSPHERIC PRESS: 744 MMHG
BASE EXCESS BLDA CALC-SCNC: -5.6 MMOL/L (ref 0–2)
BASOPHILS # BLD AUTO: 0.08 10*3/MM3 (ref 0–0.2)
BASOPHILS NFR BLD AUTO: 0.9 % (ref 0–1.5)
BDY SITE: ABNORMAL
BILIRUB SERPL-MCNC: 0.2 MG/DL (ref 0–1.2)
BUN SERPL-MCNC: 34 MG/DL (ref 8–23)
BUN/CREAT SERPL: 14.6 (ref 7–25)
CA-I BLD-MCNC: 4.55 MG/DL (ref 4.6–5.6)
CALCIUM SPEC-SCNC: 8.6 MG/DL (ref 8.6–10.5)
CHLORIDE SERPL-SCNC: 109 MMOL/L (ref 98–107)
CHOLEST SERPL-MCNC: 130 MG/DL (ref 0–200)
CO2 SERPL-SCNC: 25 MMOL/L (ref 22–29)
COHGB MFR BLD: 1.7 % (ref 0–5)
CREAT SERPL-MCNC: 2.33 MG/DL (ref 0.57–1)
DEPRECATED RDW RBC AUTO: 60.6 FL (ref 37–54)
EOSINOPHIL # BLD AUTO: 0.31 10*3/MM3 (ref 0–0.4)
EOSINOPHIL NFR BLD AUTO: 3.7 % (ref 0.3–6.2)
ERYTHROCYTE [DISTWIDTH] IN BLOOD BY AUTOMATED COUNT: 18.4 % (ref 12.3–15.4)
FLUAV SUBTYP SPEC NAA+PROBE: NOT DETECTED
FLUBV RNA ISLT QL NAA+PROBE: NOT DETECTED
GFR SERPL CREATININE-BSD FRML MDRD: 21 ML/MIN/1.73
GLOBULIN UR ELPH-MCNC: 3.9 GM/DL
GLUCOSE BLDA-MCNC: 149 MMOL/L (ref 65–95)
GLUCOSE SERPL-MCNC: 143 MG/DL (ref 65–99)
HCO3 BLDA-SCNC: 21.5 MMOL/L (ref 20–26)
HCT VFR BLD AUTO: 30.1 % (ref 34–46.6)
HCT VFR BLD CALC: 33.4 % (ref 38–51)
HDLC SERPL-MCNC: 39 MG/DL (ref 40–60)
HGB BLD-MCNC: 9 G/DL (ref 12–15.9)
HGB BLDA-MCNC: 10.9 G/DL (ref 13.5–17.5)
HOLD SPECIMEN: NORMAL
IMM GRANULOCYTES # BLD AUTO: 0.1 10*3/MM3 (ref 0–0.05)
IMM GRANULOCYTES NFR BLD AUTO: 1.2 % (ref 0–0.5)
L PNEUMO1 AG UR QL IA: NEGATIVE
LDLC SERPL CALC-MCNC: 41 MG/DL (ref 0–100)
LDLC/HDLC SERPL: 0.6 {RATIO}
LYMPHOCYTES # BLD AUTO: 2.64 10*3/MM3 (ref 0.7–3.1)
LYMPHOCYTES NFR BLD AUTO: 31.2 % (ref 19.6–45.3)
MAGNESIUM SERPL-MCNC: 1.5 MG/DL (ref 1.6–2.4)
MCH RBC QN AUTO: 28.8 PG (ref 26.6–33)
MCHC RBC AUTO-ENTMCNC: 29.9 G/DL (ref 31.5–35.7)
MCV RBC AUTO: 96.2 FL (ref 79–97)
METHGB BLD QL: 0.1 % (ref 0–3)
MODALITY: ABNORMAL
MONOCYTES # BLD AUTO: 0.48 10*3/MM3 (ref 0.1–0.9)
MONOCYTES NFR BLD AUTO: 5.7 % (ref 5–12)
NEUTROPHILS NFR BLD AUTO: 4.84 10*3/MM3 (ref 1.7–7)
NEUTROPHILS NFR BLD AUTO: 57.3 % (ref 42.7–76)
NOTE: ABNORMAL
NRBC BLD AUTO-RTO: 0.6 /100 WBC (ref 0–0.2)
NT-PROBNP SERPL-MCNC: 2236 PG/ML (ref 0–900)
OXYHGB MFR BLDV: 88.9 % (ref 94–99)
PCO2 BLDA: 48.4 MM HG (ref 35–45)
PCO2 TEMP ADJ BLD: ABNORMAL MM[HG]
PH BLDA: 7.26 PH UNITS (ref 7.35–7.45)
PH, TEMP CORRECTED: ABNORMAL
PLATELET # BLD AUTO: 273 10*3/MM3 (ref 140–450)
PMV BLD AUTO: 9.1 FL (ref 6–12)
PO2 BLDA: 64.2 MM HG (ref 83–108)
PO2 TEMP ADJ BLD: ABNORMAL MM[HG]
POTASSIUM BLDA-SCNC: 5.7 MMOL/L (ref 3.4–4.5)
POTASSIUM SERPL-SCNC: 6.1 MMOL/L (ref 3.5–5.2)
PROT SERPL-MCNC: 7.5 G/DL (ref 6–8.5)
RBC # BLD AUTO: 3.13 10*6/MM3 (ref 3.77–5.28)
S PNEUM AG SPEC QL LA: NEGATIVE
SAO2 % BLDCOA: 90.5 % (ref 94–99)
SARS-COV-2 RNA PNL SPEC NAA+PROBE: NOT DETECTED
SODIUM BLDA-SCNC: 141 MMOL/L (ref 136–146)
SODIUM SERPL-SCNC: 140 MMOL/L (ref 136–145)
TRIGL SERPL-MCNC: 338 MG/DL (ref 0–150)
TROPONIN T SERPL-MCNC: 0.02 NG/ML (ref 0–0.03)
TROPONIN T SERPL-MCNC: <0.01 NG/ML (ref 0–0.03)
VENTILATOR MODE: ABNORMAL
VLDLC SERPL-MCNC: 50 MG/DL (ref 5–40)
WBC # BLD AUTO: 8.45 10*3/MM3 (ref 3.4–10.8)

## 2021-06-02 PROCEDURE — 25010000002 CALCIUM GLUCONATE PER 10 ML: Performed by: STUDENT IN AN ORGANIZED HEALTH CARE EDUCATION/TRAINING PROGRAM

## 2021-06-02 PROCEDURE — 85025 COMPLETE CBC W/AUTO DIFF WBC: CPT | Performed by: EMERGENCY MEDICINE

## 2021-06-02 PROCEDURE — 94799 UNLISTED PULMONARY SVC/PX: CPT

## 2021-06-02 PROCEDURE — 83735 ASSAY OF MAGNESIUM: CPT | Performed by: EMERGENCY MEDICINE

## 2021-06-02 PROCEDURE — 87899 AGENT NOS ASSAY W/OPTIC: CPT | Performed by: STUDENT IN AN ORGANIZED HEALTH CARE EDUCATION/TRAINING PROGRAM

## 2021-06-02 PROCEDURE — 93010 ELECTROCARDIOGRAM REPORT: CPT | Performed by: INTERNAL MEDICINE

## 2021-06-02 PROCEDURE — 94760 N-INVAS EAR/PLS OXIMETRY 1: CPT

## 2021-06-02 PROCEDURE — 82805 BLOOD GASES W/O2 SATURATION: CPT

## 2021-06-02 PROCEDURE — 99285 EMERGENCY DEPT VISIT HI MDM: CPT

## 2021-06-02 PROCEDURE — 25010000002 AZITHROMYCIN PER 500 MG: Performed by: EMERGENCY MEDICINE

## 2021-06-02 PROCEDURE — 83880 ASSAY OF NATRIURETIC PEPTIDE: CPT | Performed by: EMERGENCY MEDICINE

## 2021-06-02 PROCEDURE — 71045 X-RAY EXAM CHEST 1 VIEW: CPT

## 2021-06-02 PROCEDURE — 82375 ASSAY CARBOXYHB QUANT: CPT

## 2021-06-02 PROCEDURE — 87636 SARSCOV2 & INF A&B AMP PRB: CPT | Performed by: EMERGENCY MEDICINE

## 2021-06-02 PROCEDURE — 84484 ASSAY OF TROPONIN QUANT: CPT | Performed by: EMERGENCY MEDICINE

## 2021-06-02 PROCEDURE — 83050 HGB METHEMOGLOBIN QUAN: CPT

## 2021-06-02 PROCEDURE — 80053 COMPREHEN METABOLIC PANEL: CPT | Performed by: EMERGENCY MEDICINE

## 2021-06-02 PROCEDURE — 93005 ELECTROCARDIOGRAM TRACING: CPT

## 2021-06-02 PROCEDURE — 93005 ELECTROCARDIOGRAM TRACING: CPT | Performed by: EMERGENCY MEDICINE

## 2021-06-02 PROCEDURE — 25010000002 HYDRALAZINE PER 20 MG: Performed by: STUDENT IN AN ORGANIZED HEALTH CARE EDUCATION/TRAINING PROGRAM

## 2021-06-02 PROCEDURE — 99221 1ST HOSP IP/OBS SF/LOW 40: CPT | Performed by: STUDENT IN AN ORGANIZED HEALTH CARE EDUCATION/TRAINING PROGRAM

## 2021-06-02 PROCEDURE — 25010000002 HEPARIN (PORCINE) PER 1000 UNITS: Performed by: STUDENT IN AN ORGANIZED HEALTH CARE EDUCATION/TRAINING PROGRAM

## 2021-06-02 PROCEDURE — 25010000002 METHYLPREDNISOLONE PER 125 MG: Performed by: EMERGENCY MEDICINE

## 2021-06-02 PROCEDURE — 25010000002 CEFEPIME PER 500 MG: Performed by: EMERGENCY MEDICINE

## 2021-06-02 PROCEDURE — 87040 BLOOD CULTURE FOR BACTERIA: CPT | Performed by: EMERGENCY MEDICINE

## 2021-06-02 PROCEDURE — 80061 LIPID PANEL: CPT | Performed by: STUDENT IN AN ORGANIZED HEALTH CARE EDUCATION/TRAINING PROGRAM

## 2021-06-02 RX ORDER — SODIUM CHLORIDE 0.9 % (FLUSH) 0.9 %
10 SYRINGE (ML) INJECTION AS NEEDED
Status: DISCONTINUED | OUTPATIENT
Start: 2021-06-02 | End: 2021-06-09 | Stop reason: HOSPADM

## 2021-06-02 RX ORDER — BUMETANIDE 0.25 MG/ML
1 INJECTION INTRAMUSCULAR; INTRAVENOUS EVERY 12 HOURS
Status: DISCONTINUED | OUTPATIENT
Start: 2021-06-02 | End: 2021-06-05

## 2021-06-02 RX ORDER — FERROUS SULFATE TAB EC 324 MG (65 MG FE EQUIVALENT) 324 (65 FE) MG
324 TABLET DELAYED RESPONSE ORAL
Status: DISCONTINUED | OUTPATIENT
Start: 2021-06-03 | End: 2021-06-09 | Stop reason: HOSPADM

## 2021-06-02 RX ORDER — CETIRIZINE HYDROCHLORIDE 10 MG/1
10 TABLET ORAL DAILY
Status: DISCONTINUED | OUTPATIENT
Start: 2021-06-03 | End: 2021-06-09 | Stop reason: HOSPADM

## 2021-06-02 RX ORDER — SODIUM CHLORIDE 0.9 % (FLUSH) 0.9 %
10 SYRINGE (ML) INJECTION EVERY 12 HOURS SCHEDULED
Status: DISCONTINUED | OUTPATIENT
Start: 2021-06-02 | End: 2021-06-09 | Stop reason: HOSPADM

## 2021-06-02 RX ORDER — SODIUM BICARBONATE 650 MG/1
650 TABLET ORAL 2 TIMES DAILY
Status: DISCONTINUED | OUTPATIENT
Start: 2021-06-02 | End: 2021-06-09 | Stop reason: HOSPADM

## 2021-06-02 RX ORDER — DEXTROSE MONOHYDRATE 25 G/50ML
25 INJECTION, SOLUTION INTRAVENOUS
Status: DISCONTINUED | OUTPATIENT
Start: 2021-06-02 | End: 2021-06-09 | Stop reason: HOSPADM

## 2021-06-02 RX ORDER — HEPARIN SODIUM 5000 [USP'U]/ML
5000 INJECTION, SOLUTION INTRAVENOUS; SUBCUTANEOUS EVERY 8 HOURS SCHEDULED
Status: DISCONTINUED | OUTPATIENT
Start: 2021-06-02 | End: 2021-06-09 | Stop reason: HOSPADM

## 2021-06-02 RX ORDER — HYDRALAZINE HYDROCHLORIDE 20 MG/ML
10 INJECTION INTRAMUSCULAR; INTRAVENOUS ONCE
Status: COMPLETED | OUTPATIENT
Start: 2021-06-02 | End: 2021-06-02

## 2021-06-02 RX ORDER — IPRATROPIUM BROMIDE AND ALBUTEROL SULFATE 2.5; .5 MG/3ML; MG/3ML
3 SOLUTION RESPIRATORY (INHALATION) ONCE
Status: COMPLETED | OUTPATIENT
Start: 2021-06-02 | End: 2021-06-02

## 2021-06-02 RX ORDER — METOPROLOL TARTRATE 50 MG/1
100 TABLET, FILM COATED ORAL EVERY 12 HOURS SCHEDULED
Status: DISCONTINUED | OUTPATIENT
Start: 2021-06-02 | End: 2021-06-09 | Stop reason: HOSPADM

## 2021-06-02 RX ORDER — BUDESONIDE AND FORMOTEROL FUMARATE DIHYDRATE 160; 4.5 UG/1; UG/1
2 AEROSOL RESPIRATORY (INHALATION)
Status: DISCONTINUED | OUTPATIENT
Start: 2021-06-02 | End: 2021-06-09 | Stop reason: HOSPADM

## 2021-06-02 RX ORDER — OXYBUTYNIN CHLORIDE 5 MG/1
5 TABLET, EXTENDED RELEASE ORAL DAILY
Status: DISCONTINUED | OUTPATIENT
Start: 2021-06-03 | End: 2021-06-09 | Stop reason: HOSPADM

## 2021-06-02 RX ORDER — IPRATROPIUM BROMIDE AND ALBUTEROL SULFATE 2.5; .5 MG/3ML; MG/3ML
3 SOLUTION RESPIRATORY (INHALATION)
Status: DISCONTINUED | OUTPATIENT
Start: 2021-06-02 | End: 2021-06-09 | Stop reason: HOSPADM

## 2021-06-02 RX ORDER — ALBUTEROL SULFATE 2.5 MG/3ML
2.5 SOLUTION RESPIRATORY (INHALATION) ONCE
Status: COMPLETED | OUTPATIENT
Start: 2021-06-02 | End: 2021-06-02

## 2021-06-02 RX ORDER — ATORVASTATIN CALCIUM 40 MG/1
40 TABLET, FILM COATED ORAL NIGHTLY
Status: DISCONTINUED | OUTPATIENT
Start: 2021-06-03 | End: 2021-06-09 | Stop reason: HOSPADM

## 2021-06-02 RX ORDER — SUCRALFATE 1 G/1
1 TABLET ORAL 4 TIMES DAILY
Status: DISCONTINUED | OUTPATIENT
Start: 2021-06-02 | End: 2021-06-09 | Stop reason: HOSPADM

## 2021-06-02 RX ORDER — MONTELUKAST SODIUM 10 MG/1
10 TABLET ORAL NIGHTLY
Status: DISCONTINUED | OUTPATIENT
Start: 2021-06-02 | End: 2021-06-09 | Stop reason: HOSPADM

## 2021-06-02 RX ORDER — ASPIRIN 81 MG/1
81 TABLET ORAL DAILY
Status: DISCONTINUED | OUTPATIENT
Start: 2021-06-03 | End: 2021-06-09 | Stop reason: HOSPADM

## 2021-06-02 RX ORDER — NYSTATIN 100000 [USP'U]/G
POWDER TOPICAL 3 TIMES DAILY
Status: DISCONTINUED | OUTPATIENT
Start: 2021-06-02 | End: 2021-06-09 | Stop reason: HOSPADM

## 2021-06-02 RX ORDER — CLOPIDOGREL BISULFATE 75 MG/1
75 TABLET ORAL DAILY
Status: DISCONTINUED | OUTPATIENT
Start: 2021-06-03 | End: 2021-06-09 | Stop reason: HOSPADM

## 2021-06-02 RX ORDER — DULOXETIN HYDROCHLORIDE 20 MG/1
20 CAPSULE, DELAYED RELEASE ORAL DAILY
Status: DISCONTINUED | OUTPATIENT
Start: 2021-06-03 | End: 2021-06-09 | Stop reason: HOSPADM

## 2021-06-02 RX ORDER — GABAPENTIN 400 MG/1
800 CAPSULE ORAL NIGHTLY
Status: DISCONTINUED | OUTPATIENT
Start: 2021-06-02 | End: 2021-06-06

## 2021-06-02 RX ORDER — BUPROPION HYDROCHLORIDE 150 MG/1
150 TABLET, EXTENDED RELEASE ORAL 2 TIMES DAILY
Status: DISCONTINUED | OUTPATIENT
Start: 2021-06-02 | End: 2021-06-03

## 2021-06-02 RX ORDER — ONDANSETRON 2 MG/ML
4 INJECTION INTRAMUSCULAR; INTRAVENOUS EVERY 6 HOURS PRN
Status: DISCONTINUED | OUTPATIENT
Start: 2021-06-02 | End: 2021-06-09 | Stop reason: HOSPADM

## 2021-06-02 RX ORDER — NICOTINE POLACRILEX 4 MG
15 LOZENGE BUCCAL
Status: DISCONTINUED | OUTPATIENT
Start: 2021-06-02 | End: 2021-06-09 | Stop reason: HOSPADM

## 2021-06-02 RX ORDER — ISOSORBIDE MONONITRATE 30 MG/1
30 TABLET, EXTENDED RELEASE ORAL DAILY
Status: DISCONTINUED | OUTPATIENT
Start: 2021-06-03 | End: 2021-06-09 | Stop reason: HOSPADM

## 2021-06-02 RX ORDER — METHYLPREDNISOLONE SODIUM SUCCINATE 125 MG/2ML
125 INJECTION, POWDER, LYOPHILIZED, FOR SOLUTION INTRAMUSCULAR; INTRAVENOUS ONCE
Status: COMPLETED | OUTPATIENT
Start: 2021-06-02 | End: 2021-06-02

## 2021-06-02 RX ORDER — PANTOPRAZOLE SODIUM 40 MG/1
40 TABLET, DELAYED RELEASE ORAL NIGHTLY
Status: DISCONTINUED | OUTPATIENT
Start: 2021-06-02 | End: 2021-06-09 | Stop reason: HOSPADM

## 2021-06-02 RX ORDER — CYCLOBENZAPRINE HCL 10 MG
10 TABLET ORAL 2 TIMES DAILY PRN
Status: DISCONTINUED | OUTPATIENT
Start: 2021-06-02 | End: 2021-06-09 | Stop reason: HOSPADM

## 2021-06-02 RX ORDER — LISINOPRIL 10 MG/1
10 TABLET ORAL DAILY
Status: DISCONTINUED | OUTPATIENT
Start: 2021-06-03 | End: 2021-06-05

## 2021-06-02 RX ADMIN — BUPROPION HYDROCHLORIDE 150 MG: 150 TABLET, EXTENDED RELEASE ORAL at 21:14

## 2021-06-02 RX ADMIN — VANCOMYCIN HYDROCHLORIDE 2500 MG: 1 INJECTION, POWDER, LYOPHILIZED, FOR SOLUTION INTRAVENOUS at 21:30

## 2021-06-02 RX ADMIN — IPRATROPIUM BROMIDE AND ALBUTEROL SULFATE 3 ML: 2.5; .5 SOLUTION RESPIRATORY (INHALATION) at 20:35

## 2021-06-02 RX ADMIN — CEFEPIME HYDROCHLORIDE 2 G: 2 INJECTION, POWDER, FOR SOLUTION INTRAVENOUS at 16:51

## 2021-06-02 RX ADMIN — BUMETANIDE 1 MG: 0.25 INJECTION INTRAMUSCULAR; INTRAVENOUS at 20:15

## 2021-06-02 RX ADMIN — SODIUM CHLORIDE, PRESERVATIVE FREE 10 ML: 5 INJECTION INTRAVENOUS at 20:17

## 2021-06-02 RX ADMIN — SODIUM BICARBONATE 650 MG: 650 TABLET ORAL at 21:14

## 2021-06-02 RX ADMIN — AZITHROMYCIN MONOHYDRATE 500 MG: 500 INJECTION, POWDER, LYOPHILIZED, FOR SOLUTION INTRAVENOUS at 18:07

## 2021-06-02 RX ADMIN — PANTOPRAZOLE SODIUM 40 MG: 40 TABLET, DELAYED RELEASE ORAL at 20:17

## 2021-06-02 RX ADMIN — BUDESONIDE AND FORMOTEROL FUMARATE DIHYDRATE 2 PUFF: 160; 4.5 AEROSOL RESPIRATORY (INHALATION) at 21:01

## 2021-06-02 RX ADMIN — IPRATROPIUM BROMIDE AND ALBUTEROL SULFATE 3 ML: 2.5; .5 SOLUTION RESPIRATORY (INHALATION) at 16:01

## 2021-06-02 RX ADMIN — NYSTATIN: 100000 POWDER TOPICAL at 21:14

## 2021-06-02 RX ADMIN — METOPROLOL TARTRATE 100 MG: 50 TABLET, FILM COATED ORAL at 20:16

## 2021-06-02 RX ADMIN — HEPARIN SODIUM 5000 UNITS: 5000 INJECTION INTRAVENOUS; SUBCUTANEOUS at 20:15

## 2021-06-02 RX ADMIN — MONTELUKAST SODIUM 10 MG: 10 TABLET, COATED ORAL at 21:14

## 2021-06-02 RX ADMIN — SUCRALFATE 1 G: 1 TABLET ORAL at 21:14

## 2021-06-02 RX ADMIN — ALBUTEROL SULFATE 2.5 MG: 2.5 SOLUTION RESPIRATORY (INHALATION) at 20:35

## 2021-06-02 RX ADMIN — METHYLPREDNISOLONE SODIUM SUCCINATE 125 MG: 125 INJECTION, POWDER, FOR SOLUTION INTRAMUSCULAR; INTRAVENOUS at 16:46

## 2021-06-02 RX ADMIN — GABAPENTIN 800 MG: 400 CAPSULE ORAL at 20:17

## 2021-06-02 RX ADMIN — CALCIUM GLUCONATE 1 G: 98 INJECTION, SOLUTION INTRAVENOUS at 20:16

## 2021-06-02 RX ADMIN — HYDRALAZINE HYDROCHLORIDE 10 MG: 20 INJECTION INTRAMUSCULAR; INTRAVENOUS at 21:15

## 2021-06-02 NOTE — H&P
"    HISTORY AND PHYSICAL  NAME: Yamileth Slater  : 1960  MRN: [id number]    DATE OF ADMISSION: 21    DATE & TIME SEEN: 21 20:55 CDT    PCP: Nirmal Calvillo MD    CODE STATUS:   Code Status and Medical Interventions:   Ordered at: 21 1828     Level Of Support Discussed With:    Patient     Code Status:    CPR     Medical Interventions (Level of Support Prior to Arrest):    Full       CHIEF COMPLAINT: Trouble breathing    HPI:  Yamileth Slater is a 61 y.o. female with a PMH of CABG X 3 in 2013, PVD, DM, Morbid obesity, HTN, HLD, COPD, Chronic respiratory failure, MRSA infection, PNA, CKD 3 and GERD who presents with worsening shortness of breath with decreased saturations at home. She states that she would have to use her BiPAP \"for 5 minutes\" to get enough air in just to do her ADLs. Her work of breathing increased as well as her oxygen usage at home and that made her scared. She denies fevers/chills, chest pain, abdominal pain, difficulty urinating or moving her bowels. She does notice her \"legs swell at night but by the morning it's gone\".        CONCURRENT MEDICAL HISTORY:  Past Medical History:   Diagnosis Date   • Anxiety    • Carotid artery stenosis    • Chronic obstructive lung disease (CMS/HCC)    • CKD (chronic kidney disease) stage 4, GFR 15-29 ml/min (CMS/HCC)    • Colonic polyp    • Coronary arteriosclerosis    • Diabetes mellitus (CMS/HCC)    • Diabetic neuropathy (CMS/HCC)    • GERD (gastroesophageal reflux disease)    • History of transfusion    • Hypercholesterolemia    • Hypertension    • Morbid obesity (CMS/HCC)    • Nephrolithiasis    • Peripheral vascular disease (CMS/HCC)    • Sleep apnea    • Substance abuse (CMS/HCC)    • Vitamin D deficiency        PAST SURGICAL HISTORY:  Past Surgical History:   Procedure Laterality Date   • CARDIAC CATHETERIZATION N/A 2020   • CARDIAC CATHETERIZATION N/A 2021    Procedure: Left Heart Cath;  Surgeon: Demetrius" Melba Thomas MD;  Location: [facility] CATH INVASIVE LOCATION;  Service: Cardiology;  Laterality: N/A;   • CARDIAC CATHETERIZATION N/A 4/30/2021    Procedure: Percutaneous Coronary Intervention;  Surgeon: Russell Voss MD;  Location: [facility] CATH INVASIVE LOCATION;  Service: Cardiovascular;  Laterality: N/A;   • CARDIAC CATHETERIZATION N/A 4/30/2021    Procedure: Stent NIKKI coronary;  Surgeon: Russell Voss MD;  Location: [facility] CATH INVASIVE LOCATION;  Service: Cardiovascular;  Laterality: N/A;   • CAROTID STENT Left    • COLONOSCOPY     • COLONOSCOPY N/A 5/14/2021    Procedure: COLONOSCOPY;  Surgeon: Mingo Duarte MD;  Location: [facility] ENDOSCOPY;  Service: Gastroenterology;  Laterality: N/A;   • CORONARY ARTERY BYPASS GRAFT N/A 2013    CABG X 3   • CYSTOSCOPY BLADDER STONE LITHOTRIPSY Bilateral    • ENDOSCOPY N/A 4/12/2021    Procedure: ESOPHAGOGASTRODUODENOSCOPY;  Surgeon: Mingo Duarte MD;  Location: [facility] ENDOSCOPY;  Service: Gastroenterology;  Laterality: N/A;   • ENDOSCOPY N/A 5/14/2021    Procedure: ESOPHAGOGASTRODUODENOSCOPY;  Surgeon: Mingo Duarte MD;  Location: [facility] ENDOSCOPY;  Service: Gastroenterology;  Laterality: N/A;       FAMILY HISTORY:  Family History   Problem Relation Age of Onset   • Heart disease Mother    • Heart disease Father    • Heart disease Sister    • Heart disease Brother         SOCIAL HISTORY:  Social History     Socioeconomic History   • Marital status:      Spouse name: wesley dumont   • Number of children: Not on file   • Years of education: Not on file   • Highest education level: Not on file   Tobacco Use   • Smoking status: Light Tobacco Smoker     Packs/day: 0.25     Years: 46.00     Pack years: 11.50     Types: Cigarettes   • Smokeless tobacco: Never Used   • Tobacco comment: only smoking 5 a day    Substance and Sexual Activity   • Alcohol use: No   • Drug use: Not Currently     Types: LSD, Marijuana, Methamphetamines   • Sexual  activity: Not Currently       HOME MEDICATIONS:  Prior to Admission medications    Medication Sig Start Date End Date Taking? Authorizing Provider   albuterol sulfate  (90 Base) MCG/ACT inhaler Inhale 2 puffs Every 4 (Four) Hours As Needed for Wheezing. 3/26/21  Yes Nirmal Calvillo MD   aspirin (aspirin) 81 MG EC tablet Take 1 tablet by mouth Daily. 5/1/21  Yes Jr Jaime Estrada MD   atorvastatin (LIPITOR) 40 MG tablet Take 1 tablet by mouth Daily.  Patient taking differently: Take 20 mg by mouth Daily. 3/26/21  Yes Nirmal Calvillo MD   Blood Glucose Monitoring Suppl ([employer] Blood Glucose Meter) w/Device kit 1 each 3 (Three) Times a Day. 10/9/20  Yes Nirmal Calvillo MD   budesonide-formoterol (SYMBICORT) 160-4.5 MCG/ACT inhaler Inhale 2 puffs 2 (Two) Times a Day. 1/1/15  Yes Caio Thompson MD   buPROPion SR (Wellbutrin SR) 150 MG 12 hr tablet Take 150 mg by mouth 2 (Two) Times a Day. 3/10/17  Yes Caio Thompson MD   cetirizine (zyrTEC) 10 MG tablet Take 1 tablet by mouth Daily. 3/26/21  Yes Nirmal Calvillo MD   clopidogrel (PLAVIX) 75 MG tablet Take 1 tablet by mouth Daily. 3/26/21  Yes Nirmal Calvillo MD   Continuous Blood Gluc  (FreeStyle Jade 2 [place]cott) device 1 each Continuous. 3/31/21  Yes Nirmal Calvillo MD   Continuous Blood Gluc Sensor (FreeStyle Jade 2 Sensor) misc 1 each Every 14 (Fourteen) Days. 3/31/21  Yes Nirmal Calvillo MD   cyclobenzaprine (FLEXERIL) 10 MG tablet Take 1 tablet by mouth 2 (Two) Times a Day As Needed for Muscle Spasms. 3/26/21  Yes Nirmal Calvillo MD   DULoxetine (Cymbalta) 20 MG capsule Take 1 capsule by mouth Daily. 3/26/21  Yes Nirmal Calvillo MD   EASY TOUCH PEN NEEDLES 31G X 8 MM misc  8/7/20  Yes Caio Thompson MD   Empagliflozin (Jardiance) 25 MG tablet Take 25 mg by mouth Daily. 3/26/21  Yes Nirmal Calvillo MD   ferrous sulfate (FeroSul) 325 (65 FE) MG tablet Take 1 tablet by mouth Daily  With Breakfast. 3/26/21  Yes Nirmal Calvillo MD   gabapentin (NEURONTIN) 800 MG tablet Take 1 tablet by mouth 3 (Three) Times a Day. 5/28/21  Yes Nirmal Calvillo MD   glucose blood test strip Use as instructed 10/9/20  Yes Nirmal Calvillo MD   insulin aspart (novoLOG FLEXPEN) 100 UNIT/ML solution pen-injector sc pen Inject 20 Units under the skin into the appropriate area as directed 3 (Three) Times a Day With Meals. 4/12/21  Yes Umesh Giraldo III, MD   Insulin Glargine (Lantus SoloStar) 100 UNIT/ML injection pen Inject 30 Units under the skin into the appropriate area as directed Every 12 (Twelve) Hours **100 day supply**  Patient taking differently: Inject 95 Units under the skin into the appropriate area as directed Every 12 (Twelve) Hours. 4/12/21  Yes Umesh Giraldo III, MD   Insulin Pen Needle (NovoFine) 30G X 8 MM misc As directed 4 times daily 3/26/21  Yes Nirmal Calvillo MD   ipratropium-albuterol (DUO-NEB) 0.5-2.5 mg/3 ml nebulizer Take 3 mL by nebulization 4 (Four) Times a Day. 3/22/17  Yes Caio Thompson MD   isosorbide mononitrate (IMDUR) 30 MG 24 hr tablet Take 1 tablet by mouth Daily. 3/26/21  Yes Nirmal Calvillo MD   lidocaine (LIDODERM) 5 % PLACE 1 PATCH ON THE SKIN AS DIRECTED BY PROVIDER DAILY. REMOVE & DISCARD PATCH WITHIN 12 HOURS OR AS DIRECTED BY MD 3/10/21  Yes Nirmal Calvillo MD   lisinopril (PRINIVIL,ZESTRIL) 10 MG tablet Take 1 tablet by mouth Daily. 12/23/20  Yes Nirmal Calvillo MD   metoprolol tartrate (LOPRESSOR) 50 MG tablet Take 50 mg by mouth 2 (Two) Times a Day. 1/1/15  Yes Caio Thompson MD   montelukast (SINGULAIR) 10 MG tablet Take 1 tablet by mouth Every Night. 3/26/21  Yes Nirmal Calvillo MD   nystatin (MYCOSTATIN) 157404 UNIT/GM powder Apply  topically to the appropriate area as directed 3 (Three) Times a Day. 3/26/21  Yes Nirmal Calvillo MD   ondansetron ODT (Zofran ODT) 4 MG disintegrating tablet Place 1  tablet on the tongue Every 8 (Eight) Hours As Needed for Nausea or Vomiting. 3/26/21  Yes Nirmal Calvillo MD   oxybutynin XL (DITROPAN-XL) 5 MG 24 hr tablet Take 1 tablet by mouth Daily. 3/26/21  Yes Nirmal Calvillo MD   pantoprazole (PROTONIX) 40 MG EC tablet Take 1 tablet by mouth Every Night. 3/26/21  Yes Nirmal Calvillo MD   potassium chloride (MICRO-K) 10 MEQ CR capsule Take 1 capsule by mouth Daily. 3/26/21  Yes Nirmal Calvillo MD   promethazine (PHENERGAN) 25 MG tablet Take 1 tablet by mouth Every 6 (Six) Hours As Needed for Nausea or Vomiting. 3/26/21  Yes Nirmal Calvillo MD   sodium bicarbonate 650 MG tablet Take 1 tablet by mouth 2 (Two) Times a Day. 4/12/21  Yes Umesh Giraldo III, MD   sucralfate (CARAFATE) 1 g tablet Take 1 tablet by mouth 4 (Four) Times a Day. 4/12/21  Yes Umesh Giraldo III, MD   metoprolol tartrate (LOPRESSOR) 100 MG tablet Take 1 tablet by mouth Every 12 (Twelve) Hours for 30 days.  Patient taking differently: Take 100 mg by mouth 2 (Two) Times a Day. 10/9/20 12/18/20  Nirmal Calvillo MD   nitroglycerin (NITROSTAT) 0.4 MG SL tablet Place 0.4 mg under the tongue Every 5 (Five) Minutes As Needed for Chest Pain (x 3 doses). 1/1/15   Provider, MD Caio       ALLERGIES:  Adhesive tape and Other    REVIEW OF SYSTEMS  Review of Systems   Constitutional: Negative for chills and fever.   HENT: Negative for rhinorrhea and sore throat.    Eyes: Negative for photophobia and visual disturbance.   Respiratory: Positive for shortness of breath. Negative for cough.    Cardiovascular: Positive for leg swelling. Negative for chest pain and palpitations.   Gastrointestinal: Negative for abdominal pain, constipation, diarrhea, nausea and vomiting.   Genitourinary: Negative for difficulty urinating and flank pain.   Musculoskeletal: Negative for arthralgias and back pain.   Neurological: Negative for dizziness and headaches.   Psychiatric/Behavioral: Negative  for confusion. The patient is not nervous/anxious.        PHYSICAL EXAM:  Temp:  [98.4 °F (36.9 °C)] 98.4 °F (36.9 °C)  Heart Rate:  [62-77] 76  Resp:  [20-24] 22  BP: (150-230)/(68-98) 170/70  Body mass index is 49.38 kg/m².  Physical Exam  Vitals reviewed.   Constitutional:       Appearance: Normal appearance. She is well-developed and well-groomed. She is morbidly obese.      Interventions: Nasal cannula in place.      Comments: 5L   HENT:      Head: Normocephalic.      Right Ear: Hearing and external ear normal.      Left Ear: Hearing and external ear normal.      Nose: Nose normal.      Mouth/Throat:      Lips: Pink.      Mouth: Mucous membranes are moist.      Dentition: Has dentures.      Pharynx: Oropharynx is clear. Uvula midline.   Eyes:      General: Lids are normal.      Conjunctiva/sclera: Conjunctivae normal.      Pupils: Pupils are equal, round, and reactive to light.   Cardiovascular:      Rate and Rhythm: Normal rate and regular rhythm.      Pulses:           Radial pulses are 1+ on the right side and 1+ on the left side.        Dorsalis pedis pulses are 1+ on the right side and 1+ on the left side.        Posterior tibial pulses are 1+ on the right side and 1+ on the left side.      Heart sounds: Normal heart sounds. No murmur heard.   No friction rub. No gallop.    Pulmonary:      Effort: Accessory muscle usage and respiratory distress present.      Breath sounds: Decreased air movement present. Examination of the right-middle field reveals wheezing. Examination of the left-middle field reveals wheezing. Decreased breath sounds and wheezing present. No rhonchi or rales.   Abdominal:      General: Abdomen is protuberant. Bowel sounds are normal. There is distension.      Palpations: Abdomen is soft.      Tenderness: There is no abdominal tenderness. There is no guarding.   Musculoskeletal:      Cervical back: Neck supple.      Right lower le+ Pitting Edema present.      Left lower le+  Pitting Edema present.   Skin:     General: Skin is warm.   Neurological:      Mental Status: She is alert and oriented to person, place, and time.   Psychiatric:         Attention and Perception: Attention and perception normal.         Mood and Affect: Mood and affect normal.         Speech: Speech normal.         Behavior: Behavior normal. Behavior is cooperative.         Thought Content: Thought content normal.         Cognition and Memory: Cognition and memory normal.         Judgment: Judgment normal.         DIAGNOSTIC DATA:   Lab Results (last 24 hours)     Procedure Component Value Units Date/Time    Troponin [[id number]]  (Normal) Collected: 06/02/21 1646    Specimen: Blood Updated: 06/02/21 1711     Troponin T <0.010 ng/mL     Narrative:      Troponin T Reference Range:  <= 0.03 ng/mL-   Negative for AMI  >0.03 ng/mL-     Abnormal for myocardial necrosis.  Clinicians would have to utilize clinical acumen, EKG, Troponin and serial changes to determine if it is an Acute Myocardial Infarction or myocardial injury due to an underlying chronic condition.       Results may be falsely decreased if patient taking Biotin.      Extra Tubes [[id number]] Collected: 06/02/21 1557    Specimen: Blood, Venous Line Updated: 06/02/21 1700    Narrative:      The following orders were created for panel order Extra Tubes.  Procedure                               Abnormality         Status                     ---------                               -----------         ------                     Gold Top - SST[[id number]]                                   Final result                 Please view results for these tests on the individual orders.    Gold Top - SST [[id number]] Collected: 06/02/21 1557    Specimen: Blood Updated: 06/02/21 1700     Extra Tube Hold for add-ons.     Comment: Auto resulted.       Blood Culture - Blood, Arm, Left [[id number]] Collected: 06/02/21 1646    Specimen: Blood from Arm, Left Updated: 06/02/21 1652     Blood Gas, Arterial With Co-Ox [[id number]]  (Abnormal) Collected: 06/02/21 1645    Specimen: Arterial Blood Updated: 06/02/21 1645     Site Arterial Line     Shadi's Test N/A     pH, Arterial 7.256 pH units      Comment: 84 Value below reference range        pCO2, Arterial 48.4 mm Hg      Comment: 83 Value above reference range        pO2, Arterial 64.2 mm Hg      Comment: 84 Value below reference range        HCO3, Arterial 21.5 mmol/L      Base Excess, Arterial -5.6 mmol/L      Comment: 84 Value below reference range        O2 Saturation, Arterial 90.5 %      Comment: 84 Value below reference range        Hemoglobin, Blood Gas 10.9 g/dL      Comment: 84 Value below reference range        Hematocrit, Blood Gas 33.4 %      Comment: 84 Value below reference range        Oxyhemoglobin 88.9 %      Comment: 84 Value below reference range        Methemoglobin 0.10 %      Carboxyhemoglobin 1.7 %      A-a Gradiant 27.6 mmHg      Sodium, Arterial 141 mmol/L      Potassium, Arterial 5.7 mmol/L      Comment: 83 Value above reference range        Ionized Calcium 4.55 mg/dL      Glucose, Arterial 149 mmol/L      Barometric Pressure for Blood Gas 744 mmHg      Modality N/A     Ventilator Mode NA     Comment: Meter: [id number]     :  [id number]        Note --     pH, Temp Corrected --     pCO2, Temperature Corrected --     pO2, Temperature Corrected --    Comprehensive Metabolic Panel [[id number]]  (Abnormal) Collected: 06/02/21 1552    Specimen: Blood Updated: 06/02/21 1636     Glucose 143 mg/dL      BUN 34 mg/dL      Creatinine 2.33 mg/dL      Sodium 140 mmol/L      Potassium 6.1 mmol/L      Chloride 109 mmol/L      CO2 25.0 mmol/L      Calcium 8.6 mg/dL      Total Protein 7.5 g/dL      Albumin 3.60 g/dL      ALT (SGPT) 11 U/L      AST (SGOT) 14 U/L      Alkaline Phosphatase 129 U/L      Total Bilirubin 0.2 mg/dL      eGFR Non African Amer 21 mL/min/1.73      Globulin 3.9 gm/dL      A/G Ratio 0.9 g/dL       BUN/Creatinine Ratio 14.6     Anion Gap 6.0 mmol/L     Narrative:      GFR Normal >60  Chronic Kidney Disease <60  Kidney Failure <15      Troponin [[id number]]  (Normal) Collected: 06/02/21 1552    Specimen: Blood Updated: 06/02/21 1620     Troponin T 0.016 ng/mL     Narrative:      Troponin T Reference Range:  <= 0.03 ng/mL-   Negative for AMI  >0.03 ng/mL-     Abnormal for myocardial necrosis.  Clinicians would have to utilize clinical acumen, EKG, Troponin and serial changes to determine if it is an Acute Myocardial Infarction or myocardial injury due to an underlying chronic condition.       Results may be falsely decreased if patient taking Biotin.      Magnesium [[id number]]  (Abnormal) Collected: 06/02/21 1552    Specimen: Blood Updated: 06/02/21 1620     Magnesium 1.5 mg/dL     BNP [[id number]]  (Abnormal) Collected: 06/02/21 1552    Specimen: Blood Updated: 06/02/21 1617     proBNP 2,236.0 pg/mL     Narrative:      Among patients with dyspnea, NT-proBNP is highly sensitive for the detection of acute congestive heart failure. In addition NT-proBNP of <300 pg/ml effectively rules out acute congestive heart failure with 99% negative predictive value.    Results may be falsely decreased if patient taking Biotin.      CBC & Differential [[id number]]  (Abnormal) Collected: 06/02/21 1552    Specimen: Blood Updated: 06/02/21 1559    Narrative:      The following orders were created for panel order CBC & Differential.  Procedure                               Abnormality         Status                     ---------                               -----------         ------                     CBC Auto Differential[[id number]]        Abnormal            Final result                 Please view results for these tests on the individual orders.    CBC Auto Differential [[id number]]  (Abnormal) Collected: 06/02/21 1552    Specimen: Blood Updated: 06/02/21 1559     WBC 8.45 10*3/mm3      RBC 3.13 10*6/mm3      Hemoglobin 9.0  g/dL      Hematocrit 30.1 %      MCV 96.2 fL      MCH 28.8 pg      MCHC 29.9 g/dL      RDW 18.4 %      RDW-SD 60.6 fl      MPV 9.1 fL      Platelets 273 10*3/mm3      Neutrophil % 57.3 %      Lymphocyte % 31.2 %      Monocyte % 5.7 %      Eosinophil % 3.7 %      Basophil % 0.9 %      Immature Grans % 1.2 %      Neutrophils, Absolute 4.84 10*3/mm3      Lymphocytes, Absolute 2.64 10*3/mm3      Monocytes, Absolute 0.48 10*3/mm3      Eosinophils, Absolute 0.31 10*3/mm3      Basophils, Absolute 0.08 10*3/mm3      Immature Grans, Absolute 0.10 10*3/mm3      nRBC 0.6 /100 WBC     COVID-19 and FLU A/B PCR - Swab, Nasopharynx [[id number]]  (Normal) Collected: 06/02/21 1525    Specimen: Swab from Nasopharynx Updated: 06/02/21 1547     COVID19 Not Detected     Influenza A PCR Not Detected     Influenza B PCR Not Detected    Narrative:      Fact sheet for providers: https://www.fda.gov/media/198982/download    Fact sheet for patients: https://www.fda.gov/media/911715/download    Test performed by PCR.           Imaging Results (Last 24 Hours)     Procedure Component Value Units Date/Time    XR Chest 1 View [[id number]] Collected: 06/02/21 1514     Updated: 06/02/21 1542    Narrative:      PROCEDURE: Single view AP upright portable chest x-ray at 2:47  PM.    INDICATION: Shortness of breath.    COMPARISON: 5/16/2021 CT angiogram of the chest. 5/13/2021 chest  x-ray and earlier chest exams.    FINDINGS:    Stable sternal sutures.    Cardiomegaly with moderate vascular congestion.    Bilateral pulmonary infiltrates increased on left and similar to  slightly worse on the right compared to 5/13/2021.  Findings may represent changes of pulmonary edema and/or  superimposed pneumonia.      Right CP angle. Left CP angle sharp.      Impression:      Cardiomegaly with moderate vascular congestion.  Bilateral pulmonary infiltrates increased on the left similar to  increased on the right differential includes pulmonary edema  and/or  superimposed pneumonia.    Mild blunting right CP angle.    [id number]    Electronically signed by:  Nirmal Verma MD  6/2/2021 3:41 PM  CDT Workstation: [id number]          I reviewed the patient's new clinical results.  I reviewed the patient's new imaging results and agree with the interpretation.  I reviewed the patient's other test results and agree with the interpretation  Discussed with Dr. Delarosa.    ASSESSMENT AND PLAN: This is a 61 y.o. female with:    Active Hospital Problems    Diagnosis  POA   • **Acute on chronic respiratory failure with hypoxia and hypercapnia (CMS/HCC) [J96.21, J96.22]  Yes     Last Arterial Blood Gas  Lab Results   Component Value Date    PHART 7.256 (C) 06/02/2021    VUB6WLM 48.4 (H) 06/02/2021    PO2ART 64.2 (L) 06/02/2021    VFT6LEM 21.5 06/02/2021    BASEEXCESS -5.6 (L) 06/02/2021    G2LDTCUJ 90.5 (L) 06/02/2021 ·     · Duo nebs and albuterol as needed  · Patient brought her own BiPAP to wear at night  · O2 maintain sats between 88-92%  · One time solumedrol in ER  CARDIAC LABS:      Lab 06/02/21  1646 06/02/21  1552   PROBNP  --  2,236.0*   TROPONIN T <0.010 0.016 ·     · Repeat Echo in am, last echo in April 2021 showed EF 61.3% with moderate concentric hypertrophy     • Acute pulmonary edema (CMS/HCC) [J81.0]  Yes     · CXR shows cardiomegaly and pulmonary infiltrates   · O2 nc at 5L with saturations 90% titrate to maintain saturations 88-92%  · Bumex 1mg bid  · Incentive spirometry  · Patient supplied BiPAP at night     • Hyperkalemia [E87.5]  Yes     · 6.1 in ER, EKG showed NSR with old RBBB  · Albuterol treatment  · Kayexalate   · Calcium gluconate 1g  · Consider insulin if not improving     • Morbid obesity (CMS/HCC) [E66.01]  Yes     · BMI 49.38  · Likely has obesity hypoventilation syndrome with poor respiratory effort  · Consistent carbohydrate/cardiac/renal diet     • Acute kidney injury superimposed on chronic kidney disease (CMS/HCC) [N17.9, N18.9]  Yes      · Baseline creatinine 1.7 to 1.9 and GFR 25-30  Lab Results   Component Value Date    CREATININE 2.33 (H) 06/02/2021 ·     · Hold nephrotoxic agents  · Trend creatinine  · IVF when fluid overload improves  · Continue bicarb tablets  · Continue Lisinopril 10mg       • Pneumonia of both lungs due to infectious organism [J18.9]  Yes     · Azithromycin and Ceftriaxone  · Atypicals pending, if negative d/c Azithromycin  · Duo nebs and albuterol as needed  · Continue Symbicort  · ABG as needed  · CXR showed PNA vs congestion     • Acute renal failure superimposed on chronic kidney disease (CMS/[facility]) [N17.9, N18.9]  Yes     · Held Empagliflozin due to GFR < 30  · Bumex 1mg   · Trend creatinine  · Hold nephrotoxic agents  · Renal diet     • Uncontrolled type 2 diabetes mellitus with complication, with long-term current use of insulin (CMS/[facility]) [E11.8, E11.65, Z79.4]  Not Applicable     · Home regimen held  · SSI     • Chronic obstructive pulmonary disease (CMS/[facility]) [J44.9]  Yes     · Symbicort  · Duo nebs and albuterol as needed  · Continue montelukast and cetirizine  · Incentive spirometry     • Mixed hyperlipidemia [E78.2]  Yes     · No lipid panel since 6/24/2020  · Repeat lipid panel   · Continue Atorvastatin 40mg     • Coronary artery disease [I25.10]  Yes     · Continue ASA 81mg, Atorvastatin 40mg, Clopidogrel 75mg, Imdur 30mg  · Last cath was 4/30/2021 showed multivessel dz with successful PCI of LM and proximal Cx with dug eluding stent, and PTCA of native ramus and PCI of proximal SVG to ramus with drug eluding stent.      • GERD (gastroesophageal reflux disease) [K21.9]  Yes     · Protonix 40mg IV     • Essential hypertension [I10]  Yes     · /89 in ER, prn hydralazine  · Continue Lisinopril 10mg, Metoprolol 100mg bid     • S/P CABG x 3 [Z95.1]  Not Applicable     · Completed in 2013  · Continue ASA 81mg, Atorvastatin 40mg, Clopidogrel 75mg, Imdur 30mg, Lisinopril 10mg, Metoprolol 100mg bid     • PAD  (peripheral artery disease) (CMS/[facility]) [I73.9]  Yes     · Continue ASA 81mg, Clopidogrel 75mg, Atorvastatin 40mg           DVT prophylaxis:   Mechanical Order History:     None      Pharmalogical Order History:      Ordered     Dose Route Frequency Stop    06/02/21 1930  heparin (porcine) 5000 UNIT/ML injection 5,000 Units      5,000 Units SC Every 8 Hours Scheduled --               Code status is   Code Status and Medical Interventions:   Ordered at: 06/02/21 1824     Level Of Support Discussed With:    Patient     Code Status:    CPR     Medical Interventions (Level of Support Prior to Arrest):    Full        Yamileth Slater and I have discussed pain goals for this hospitalization after reviewing her current clinical condition, medical history and prior pain experiences.  The goal is to keep her  pain level 4-5/10.  To help achieve this, I plan to provide Tylenol for mild pain and Percocet 5/325mg for moderate pain.    [facility] # [id number], reviewed and consistent with patient reported medications.      I discussed the patient's findings and my recommendations with patient and primary care team.     Dr. Delarosa is the attending on record at time of admission, she is aware of the patient's status and agrees with the above history and physical.        This document has been electronically signed by Haily Mcneil MD on June 2, 2021 20:56 CDT     Part of this note may be an electronic transcription/translation of spoken language to printed text using the Dragon Dictation System.

## 2021-06-02 NOTE — ACP (ADVANCE CARE PLANNING)
The patient desires to be full code. She designates Huy Slater who can be contacted at [phone] to make medical decisions on her behalf if She is incapable of doing so. Patient declared said statement while fully alert and oriented on 06/02/21 at 17:58 CDT.        This document has been electronically signed by Haily Mcneil MD on June 2, 2021 17:58 CDT

## 2021-06-02 NOTE — TELEPHONE ENCOUNTER
PATIENTS EMERGENCY CONTACT CALLED AND WANTED TO LET DR LOPEZ KNOW THAT HE WAS TAKING HER TO THE ER FOR RESPIRATORY DISTRESS. I TRIED TO OFFER AN APPOINTMENT BUT HE WAS INSISTENT IN TAKING HER TO THE ER. I DID LET DR LOPEZ KNOW WHAT WAS GOING ON. HE IS AWARE. THE NUMBER -[phone].          THANK YOU,      RAINER

## 2021-06-02 NOTE — ED NOTES
Pt states that she has been sick for a few days now and has had increased SOA. She does have a history of COPD and wears 3L O2 at home. She was admitted with pneumonia around a week and a half ago and has been feeling sick since then.      Khadra Warner, RN  06/02/21 [id number]

## 2021-06-02 NOTE — ED PROVIDER NOTES
Subjective   61-year-old morbidly obese female with history of peripheral vascular disease, sleep apnea, COPD, CKD, coronary artery disease, diabetes and GERD as well as hypertension and hypercholesterolemia presents the emergency department with complaint of shortness of breath x4 days.  Discharged from this facility for treatment of her chronic respiratory failure and pneumonia less than 2 weeks ago.  Reports she took antibiotics after discharge but no oral steroids.  Reports she had to turn her oxygen up at home and still feeling short of breath.  Daughter reports that they cannot get her oxygen level above 88% on her usual 2 L.    Family history, surgical history, social history, current medications and allergies are reviewed with the patient and triage documentation and vitals are reviewed.      History provided by:  Patient, medical records and relative   used: No        Review of Systems   Constitutional: Negative for chills and fever.   HENT: Negative for congestion and sore throat.    Eyes: Negative for photophobia and visual disturbance.   Respiratory: Positive for cough, chest tightness, shortness of breath and wheezing.    Cardiovascular: Negative for chest pain, palpitations and leg swelling.   Gastrointestinal: Negative for abdominal pain, diarrhea, nausea and vomiting.   Endocrine: Negative for polydipsia, polyphagia and polyuria.   Genitourinary: Negative for dysuria, frequency and urgency.   Musculoskeletal: Negative for arthralgias, back pain, myalgias and neck pain.   Skin: Negative for rash and wound.   Allergic/Immunologic: Negative.    Neurological: Negative for weakness, light-headedness, numbness and headaches.   Hematological: Negative.    Psychiatric/Behavioral: Negative.        Past Medical History:   Diagnosis Date   • Anxiety    • Carotid artery stenosis    • Chronic obstructive lung disease (CMS/[facility])    • CKD (chronic kidney disease) stage 4, GFR 15-29 ml/min  (CMS/[facility])    • Colonic polyp    • Coronary arteriosclerosis    • Diabetes mellitus (CMS/HCC)    • Diabetic neuropathy (CMS/[facility])    • GERD (gastroesophageal reflux disease)    • History of transfusion    • Hypercholesterolemia    • Hypertension    • Morbid obesity (CMS/HCC)    • Nephrolithiasis    • Peripheral vascular disease (CMS/HCC)    • Sleep apnea    • Substance abuse (CMS/[facility])    • Vitamin D deficiency        Allergies   Allergen Reactions   • Adhesive Tape Hives   • Other      Bandaids, MRSA, SURECLOSE       Past Surgical History:   Procedure Laterality Date   • CARDIAC CATHETERIZATION N/A 7/14/2020   • CARDIAC CATHETERIZATION N/A 4/23/2021    Procedure: Left Heart Cath;  Surgeon: Melba Romo MD;  Location: [facility] CATH INVASIVE LOCATION;  Service: Cardiology;  Laterality: N/A;   • CARDIAC CATHETERIZATION N/A 4/30/2021    Procedure: Percutaneous Coronary Intervention;  Surgeon: Russell Voss MD;  Location: [facility] CATH INVASIVE LOCATION;  Service: Cardiovascular;  Laterality: N/A;   • CARDIAC CATHETERIZATION N/A 4/30/2021    Procedure: Stent NIKKI coronary;  Surgeon: Russell Voss MD;  Location: [facility] CATH INVASIVE LOCATION;  Service: Cardiovascular;  Laterality: N/A;   • CAROTID STENT Left    • COLONOSCOPY     • COLONOSCOPY N/A 5/14/2021    Procedure: COLONOSCOPY;  Surgeon: Mingo Duarte MD;  Location: [facility] ENDOSCOPY;  Service: Gastroenterology;  Laterality: N/A;   • CORONARY ARTERY BYPASS GRAFT     • CYSTOSCOPY BLADDER STONE LITHOTRIPSY Bilateral    • ENDOSCOPY N/A 4/12/2021    Procedure: ESOPHAGOGASTRODUODENOSCOPY;  Surgeon: Mingo Duarte MD;  Location: [facility] ENDOSCOPY;  Service: Gastroenterology;  Laterality: N/A;   • ENDOSCOPY N/A 5/14/2021    Procedure: ESOPHAGOGASTRODUODENOSCOPY;  Surgeon: Mingo Duarte MD;  Location: [facility] ENDOSCOPY;  Service: Gastroenterology;  Laterality: N/A;       Family History   Problem Relation Age of Onset   • Heart disease Mother     • Heart disease Father    • Heart disease Sister    • Heart disease Brother        Social History     Socioeconomic History   • Marital status:      Spouse name: wesley dumont   • Number of children: Not on file   • Years of education: Not on file   • Highest education level: Not on file   Tobacco Use   • Smoking status: Light Tobacco Smoker     Packs/day: 0.25     Years: 46.00     Pack years: 11.50     Types: Cigarettes   • Smokeless tobacco: Never Used   • Tobacco comment: only smoking 5 a day    Substance and Sexual Activity   • Alcohol use: No   • Drug use: Not Currently     Types: LSD, Marijuana, Methamphetamines   • Sexual activity: Not Currently           Objective   Physical Exam  Vitals and nursing note reviewed.   Constitutional:       General: She is not in acute distress.     Appearance: She is morbidly obese. She is ill-appearing. She is not toxic-appearing or diaphoretic.   HENT:      Head: Normocephalic.      Mouth/Throat:      Mouth: Mucous membranes are moist.   Eyes:      Pupils: Pupils are equal, round, and reactive to light.   Neck:      Vascular: No JVD.   Cardiovascular:      Rate and Rhythm: Normal rate and regular rhythm.  No extrasystoles are present.     Pulses: Normal pulses.      Heart sounds: No murmur heard.     Pulmonary:      Effort: Tachypnea, accessory muscle usage and respiratory distress present.      Breath sounds: Examination of the right-upper field reveals decreased breath sounds and wheezing. Examination of the left-upper field reveals decreased breath sounds and wheezing. Examination of the right-lower field reveals decreased breath sounds. Examination of the left-lower field reveals decreased breath sounds. Decreased breath sounds and wheezing present. No rhonchi or rales.   Chest:      Chest wall: No tenderness or crepitus.   Abdominal:      General: Bowel sounds are normal.      Palpations: Abdomen is soft. There is no hepatomegaly or splenomegaly.       Tenderness: There is no abdominal tenderness.   Musculoskeletal:         General: Normal range of motion.      Cervical back: Neck supple.      Right lower leg: No tenderness. No edema.      Left lower leg: No tenderness. No edema.   Skin:     General: Skin is warm and dry.      Capillary Refill: Capillary refill takes less than 2 seconds.      Coloration: Skin is not pale.      Findings: No erythema.   Neurological:      General: No focal deficit present.      Mental Status: She is alert and oriented to person, place, and time.      Motor: No weakness.   Psychiatric:         Mood and Affect: Mood normal.         Behavior: Behavior normal.         Procedures  none         ED Course      Labs Reviewed   COMPREHENSIVE METABOLIC PANEL - Abnormal; Notable for the following components:       Result Value    Glucose 143 (*)     BUN 34 (*)     Creatinine 2.33 (*)     Potassium 6.1 (*)     Chloride 109 (*)     Alkaline Phosphatase 129 (*)     eGFR Non  Amer 21 (*)     All other components within normal limits    Narrative:     GFR Normal >60  Chronic Kidney Disease <60  Kidney Failure <15     BNP (IN-HOUSE) - Abnormal; Notable for the following components:    proBNP 2,236.0 (*)     All other components within normal limits    Narrative:     Among patients with dyspnea, NT-proBNP is highly sensitive for the detection of acute congestive heart failure. In addition NT-proBNP of <300 pg/ml effectively rules out acute congestive heart failure with 99% negative predictive value.    Results may be falsely decreased if patient taking Biotin.     MAGNESIUM - Abnormal; Notable for the following components:    Magnesium 1.5 (*)     All other components within normal limits   CBC WITH AUTO DIFFERENTIAL - Abnormal; Notable for the following components:    RBC 3.13 (*)     Hemoglobin 9.0 (*)     Hematocrit 30.1 (*)     MCHC 29.9 (*)     RDW 18.4 (*)     RDW-SD 60.6 (*)     Immature Grans % 1.2 (*)     Immature Grans, Absolute 0.10  (*)     nRBC 0.6 (*)     All other components within normal limits   BLOOD GAS, ARTERIAL W/CO-OXIMETRY - Abnormal; Notable for the following components:    pH, Arterial 7.256 (*)     pCO2, Arterial 48.4 (*)     pO2, Arterial 64.2 (*)     Base Excess, Arterial -5.6 (*)     O2 Saturation, Arterial 90.5 (*)     Hemoglobin, Blood Gas 10.9 (*)     Hematocrit, Blood Gas 33.4 (*)     Oxyhemoglobin 88.9 (*)     Potassium, Arterial 5.7 (*)     Ionized Calcium 4.55 (*)     Glucose, Arterial 149 (*)     All other components within normal limits   COVID-19 AND FLU A/B, NP SWAB IN TRANSPORT MEDIA 8-12 HR TAT - Normal    Narrative:     Fact sheet for providers: https://www.fda.gov/media/293876/download    Fact sheet for patients: https://www.fda.gov/media/535497/download    Test performed by PCR.   TROPONIN (IN-HOUSE) - Normal    Narrative:     Troponin T Reference Range:  <= 0.03 ng/mL-   Negative for AMI  >0.03 ng/mL-     Abnormal for myocardial necrosis.  Clinicians would have to utilize clinical acumen, EKG, Troponin and serial changes to determine if it is an Acute Myocardial Infarction or myocardial injury due to an underlying chronic condition.       Results may be falsely decreased if patient taking Biotin.     BLOOD CULTURE   BLOOD CULTURE   BLOOD GAS, ARTERIAL   TROPONIN (IN-HOUSE)   CBC AND DIFFERENTIAL    Narrative:     The following orders were created for panel order CBC & Differential.  Procedure                               Abnormality         Status                     ---------                               -----------         ------                     CBC Auto Differential[[id number]]        Abnormal            Final result                 Please view results for these tests on the individual orders.   EXTRA TUBES    Narrative:     The following orders were created for panel order Extra Tubes.  Procedure                               Abnormality         Status                     ---------                                -----------         ------                     Gold Top - SST[[id number]]                                   Final result                 Please view results for these tests on the individual orders.   [facility]     US Guided Vascular Access    Result Date: 5/17/2021  Narrative: PROCEDURE: Ultrasound Guidance Vascular Access Ordering physician(s): JESSICA MADERA Clinical Indication: Venous access Findings: The vessel was sonographically evaluated and determined to be patent. Concurrent realtime ultrasound was used to visualize needle entry into the right basilic     vein    and a permanent image was stored for permanent recording and reporting.     Impression: Impression: Successful uneventful US guided placement right basilic vein for venous access   . Electronically signed by:  Hugo Russo MD  5/17/2021 12:48 PM CDT Workstation: [id number]    XR Chest 1 View    Result Date: 6/2/2021  Narrative: PROCEDURE: Single view AP upright portable chest x-ray at 2:47 PM. INDICATION: Shortness of breath. COMPARISON: 5/16/2021 CT angiogram of the chest. 5/13/2021 chest x-ray and earlier chest exams. FINDINGS: Stable sternal sutures. Cardiomegaly with moderate vascular congestion. Bilateral pulmonary infiltrates increased on left and similar to slightly worse on the right compared to 5/13/2021. Findings may represent changes of pulmonary edema and/or superimposed pneumonia. Right CP angle. Left CP angle sharp.     Impression: Cardiomegaly with moderate vascular congestion. Bilateral pulmonary infiltrates increased on the left similar to increased on the right differential includes pulmonary edema and/or superimposed pneumonia. Mild blunting right CP angle. [id number] Electronically signed by:  Nirmal Verma MD  6/2/2021 3:41 PM CDT Workstation: [id number]    XR Chest 1 View    Result Date: 5/13/2021  Narrative: EXAM DESCRIPTION:  XR CHEST 1 VW CLINICAL HISTORY: 61 years  Female  CHF /COPD Protocol COMPARISON: April  3, 2021 TECHNIQUE: One view-AP portable radiograph the chest FINDINGS: The cardiac silhouette is enlarged. There is mild central vascular congestion. There are patchy bilateral perihilar infiltrates noted particularly on the right. The findings most likely represent mild pulmonary edema. The distribution of the opacities in the right lung however does correspond to the anterior rib ends of the first, third, fourth and fifth ribs. If this patient has had trauma and this could represent subacute fractures of the ribs. A PA and lateral view would be helpful for more definitive assessment. There are no pleural effusions.     Impression: 1. Cardiomegaly with mild central vascular congestion suggesting mild pulmonary venous hypertension. 2. Subtle bilateral perihilar infiltrates particularly on the right. Differential considerations include ulnar edema but could also be related to an acute fracture of the anterior rib ends on the right as above. Further evaluation with PA and lateral views recommended for more definitive assessment. Electronically signed by:  Hilda Orellana MD  5/13/2021 1:19 PM CDT Workstation: [id number]    IR Insert Midline Without Port Pump 5 Plus    Result Date: 5/17/2021  Narrative: This procedure was auto-finalized with no dictation required.    CT Angiogram Chest    Result Date: 5/16/2021  Narrative: CT angiogram of the chest with contrast History: Shortness of breath. Elevated d-dimer. Axial spiral scans of the chest were obtained  with  intravenous contrast.  Coronal and sagittal reconstructions and both oblique coronal MIPS obtained the same workstation. This exam was performed according to our departmental dose-optimization program, which includes automated exposure control, adjustment of the mA and/or kV according to patient size and/or use of iterative reconstruction technique. DLP: 990.80 Comparison: March 14, 2016 Findings: No pulmonary embolism. No mediastinal hematoma. Minimal right hilar  and mediastinal lymphadenopathy. No axillary adenopathy. No thoracic aortic aneurysm or dissection. No pericardial effusion. Coronary artery bypass. Cardiomegaly and pulmonary vascular congestion. Small bilateral pleural effusions, larger on the right. Some associated compressive atelectasis in the right lower lobe. Infiltrates or edema in each lower lobe. Subsegmental infiltrates or edema inferiorly in each upper lobe. No pneumothorax. Scans of the upper abdomen are unremarkable. No acute osseous abnormality. Degenerative changes in the thoracic spine.     Impression: Conclusion: No Pulmonary Embolism. Minimal right hilar and mediastinal lymphadenopathy. Coronary artery bypass. Cardiomegaly and pulmonary vascular congestion. Small bilateral pleural effusions, larger on the right. Some associated compressive atelectasis in the right lower lobe. Infiltrates or edema in each lower lobe. Subsegmental infiltrates or edema inferiorly in each upper lobe. [id number] Electronically signed by:  Jon Patricia MD  5/16/2021 4:34 PM CDT Workstation: [id number]    EKG June 2, 2021 at 1428 reveals normal sinus rhythm rate of 75 bpm.  Incomplete right bundle branch block with T wave inversion in lead III and aVF.  No ST elevation or depression.  No evidence of acute ischemia.  .  T wave changes are new from previous EKG of May 13, 2021.        MDM  Number of Diagnoses or Management Options     Amount and/or Complexity of Data Reviewed  Clinical lab tests: reviewed  Tests in the radiology section of CPT®: reviewed  Discuss the patient with other providers: yes    Patient Progress  Patient progress: stable    Patient found to have worsening pneumonia on chest x-ray bilaterally.  She is also hypoxic with acute on chronic respiratory failure ABG showing slight acidosis.  She does have elevated potassium of 6.1 on labs but on ABG it is only 5.7.  No EKG changes related to this at this time.  Slightly worsening kidney function.  She is  maintaining sats about 89% on 4 to 5 L.  She states she is feeling a little better after breathing treatment and steroid.  Started on broad-spectrum coverage antibiotics given her recent hospitalization for pneumonia and worsening symptoms and x-ray imaging.  Advised on need for admission and she is agreeable.    Final diagnoses:   Pneumonia of both lower lobes due to infectious organism   Acute on chronic respiratory failure with hypoxia (CMS/HCC)       ED Disposition  ED Disposition     ED Disposition Condition Comment    Decision to Admit  Level of Care: Telemetry [5]   Diagnosis: Pneumonia of both lower lobes due to infectious organism [[id number]]   Admitting Physician: LEON GODINEZ [[id number]]   Certification: I Certify That Inpatient Hospital Services Are Medically Necessary For Greater Than 2 Midnights            No follow-up provider specified.       Medication List      No changes were made to your prescriptions during this visit.          Davy Ramires,   06/02/21 1701

## 2021-06-03 LAB
ALBUMIN SERPL-MCNC: 3.3 G/DL (ref 3.5–5.2)
ALBUMIN/GLOB SERPL: 0.7 G/DL
ALP SERPL-CCNC: 114 U/L (ref 39–117)
ALT SERPL W P-5'-P-CCNC: 9 U/L (ref 1–33)
ANION GAP SERPL CALCULATED.3IONS-SCNC: 8 MMOL/L (ref 5–15)
AST SERPL-CCNC: 12 U/L (ref 1–32)
BASOPHILS # BLD AUTO: 0.03 10*3/MM3 (ref 0–0.2)
BASOPHILS NFR BLD AUTO: 0.3 % (ref 0–1.5)
BILIRUB SERPL-MCNC: 0.2 MG/DL (ref 0–1.2)
BUN SERPL-MCNC: 39 MG/DL (ref 8–23)
BUN/CREAT SERPL: 17.3 (ref 7–25)
CALCIUM SPEC-SCNC: 9 MG/DL (ref 8.6–10.5)
CHLORIDE SERPL-SCNC: 110 MMOL/L (ref 98–107)
CO2 SERPL-SCNC: 21 MMOL/L (ref 22–29)
CREAT SERPL-MCNC: 2.26 MG/DL (ref 0.57–1)
DEPRECATED RDW RBC AUTO: 60 FL (ref 37–54)
EOSINOPHIL # BLD AUTO: 0 10*3/MM3 (ref 0–0.4)
EOSINOPHIL NFR BLD AUTO: 0 % (ref 0.3–6.2)
ERYTHROCYTE [DISTWIDTH] IN BLOOD BY AUTOMATED COUNT: 18.5 % (ref 12.3–15.4)
GFR SERPL CREATININE-BSD FRML MDRD: 22 ML/MIN/1.73
GLOBULIN UR ELPH-MCNC: 4.6 GM/DL
GLUCOSE BLDC GLUCOMTR-MCNC: 315 MG/DL (ref 70–130)
GLUCOSE BLDC GLUCOMTR-MCNC: 330 MG/DL (ref 70–130)
GLUCOSE BLDC GLUCOMTR-MCNC: 363 MG/DL (ref 70–130)
GLUCOSE BLDC GLUCOMTR-MCNC: 367 MG/DL (ref 70–130)
GLUCOSE SERPL-MCNC: 228 MG/DL (ref 65–99)
HCT VFR BLD AUTO: 28.8 % (ref 34–46.6)
HGB BLD-MCNC: 8.7 G/DL (ref 12–15.9)
IMM GRANULOCYTES # BLD AUTO: 0.09 10*3/MM3 (ref 0–0.05)
IMM GRANULOCYTES NFR BLD AUTO: 1 % (ref 0–0.5)
LYMPHOCYTES # BLD AUTO: 1.38 10*3/MM3 (ref 0.7–3.1)
LYMPHOCYTES NFR BLD AUTO: 16 % (ref 19.6–45.3)
MAGNESIUM SERPL-MCNC: 1.5 MG/DL (ref 1.6–2.4)
MCH RBC QN AUTO: 28.3 PG (ref 26.6–33)
MCHC RBC AUTO-ENTMCNC: 30.2 G/DL (ref 31.5–35.7)
MCV RBC AUTO: 93.8 FL (ref 79–97)
MONOCYTES # BLD AUTO: 0.13 10*3/MM3 (ref 0.1–0.9)
MONOCYTES NFR BLD AUTO: 1.5 % (ref 5–12)
MYCOPLASMAE PNEUMONIAE BY PCR: NEGATIVE
NEUTROPHILS NFR BLD AUTO: 7.02 10*3/MM3 (ref 1.7–7)
NEUTROPHILS NFR BLD AUTO: 81.2 % (ref 42.7–76)
NRBC BLD AUTO-RTO: 0.3 /100 WBC (ref 0–0.2)
PLATELET # BLD AUTO: 253 10*3/MM3 (ref 140–450)
PMV BLD AUTO: 9 FL (ref 6–12)
POTASSIUM SERPL-SCNC: 5.2 MMOL/L (ref 3.5–5.2)
POTASSIUM SERPL-SCNC: 6.2 MMOL/L (ref 3.5–5.2)
PROCALCITONIN SERPL-MCNC: 0.12 NG/ML (ref 0–0.25)
PROT SERPL-MCNC: 7.9 G/DL (ref 6–8.5)
QT INTERVAL: 422 MS
QT INTERVAL: 450 MS
QTC INTERVAL: 460 MS
QTC INTERVAL: 471 MS
RBC # BLD AUTO: 3.07 10*6/MM3 (ref 3.77–5.28)
SODIUM SERPL-SCNC: 139 MMOL/L (ref 136–145)
WBC # BLD AUTO: 8.65 10*3/MM3 (ref 3.4–10.8)

## 2021-06-03 PROCEDURE — 87581 M.PNEUMON DNA AMP PROBE: CPT | Performed by: STUDENT IN AN ORGANIZED HEALTH CARE EDUCATION/TRAINING PROGRAM

## 2021-06-03 PROCEDURE — 82962 GLUCOSE BLOOD TEST: CPT

## 2021-06-03 PROCEDURE — 63710000001 INSULIN DETEMIR PER 5 UNITS: Performed by: STUDENT IN AN ORGANIZED HEALTH CARE EDUCATION/TRAINING PROGRAM

## 2021-06-03 PROCEDURE — 94799 UNLISTED PULMONARY SVC/PX: CPT

## 2021-06-03 PROCEDURE — 84132 ASSAY OF SERUM POTASSIUM: CPT | Performed by: STUDENT IN AN ORGANIZED HEALTH CARE EDUCATION/TRAINING PROGRAM

## 2021-06-03 PROCEDURE — 83735 ASSAY OF MAGNESIUM: CPT | Performed by: STUDENT IN AN ORGANIZED HEALTH CARE EDUCATION/TRAINING PROGRAM

## 2021-06-03 PROCEDURE — 25010000002 HEPARIN (PORCINE) PER 1000 UNITS: Performed by: STUDENT IN AN ORGANIZED HEALTH CARE EDUCATION/TRAINING PROGRAM

## 2021-06-03 PROCEDURE — 63710000001 INSULIN ASPART PER 5 UNITS: Performed by: STUDENT IN AN ORGANIZED HEALTH CARE EDUCATION/TRAINING PROGRAM

## 2021-06-03 PROCEDURE — 94760 N-INVAS EAR/PLS OXIMETRY 1: CPT

## 2021-06-03 PROCEDURE — 85025 COMPLETE CBC W/AUTO DIFF WBC: CPT | Performed by: STUDENT IN AN ORGANIZED HEALTH CARE EDUCATION/TRAINING PROGRAM

## 2021-06-03 PROCEDURE — 80053 COMPREHEN METABOLIC PANEL: CPT | Performed by: STUDENT IN AN ORGANIZED HEALTH CARE EDUCATION/TRAINING PROGRAM

## 2021-06-03 PROCEDURE — 84145 PROCALCITONIN (PCT): CPT | Performed by: STUDENT IN AN ORGANIZED HEALTH CARE EDUCATION/TRAINING PROGRAM

## 2021-06-03 PROCEDURE — 25010000002 HYDRALAZINE PER 20 MG: Performed by: STUDENT IN AN ORGANIZED HEALTH CARE EDUCATION/TRAINING PROGRAM

## 2021-06-03 PROCEDURE — 93005 ELECTROCARDIOGRAM TRACING: CPT | Performed by: STUDENT IN AN ORGANIZED HEALTH CARE EDUCATION/TRAINING PROGRAM

## 2021-06-03 PROCEDURE — 36415 COLL VENOUS BLD VENIPUNCTURE: CPT | Performed by: STUDENT IN AN ORGANIZED HEALTH CARE EDUCATION/TRAINING PROGRAM

## 2021-06-03 PROCEDURE — 99232 SBSQ HOSP IP/OBS MODERATE 35: CPT | Performed by: STUDENT IN AN ORGANIZED HEALTH CARE EDUCATION/TRAINING PROGRAM

## 2021-06-03 PROCEDURE — 87040 BLOOD CULTURE FOR BACTERIA: CPT | Performed by: STUDENT IN AN ORGANIZED HEALTH CARE EDUCATION/TRAINING PROGRAM

## 2021-06-03 PROCEDURE — 93010 ELECTROCARDIOGRAM REPORT: CPT | Performed by: INTERNAL MEDICINE

## 2021-06-03 PROCEDURE — 25010000002 CEFTRIAXONE PER 250 MG: Performed by: STUDENT IN AN ORGANIZED HEALTH CARE EDUCATION/TRAINING PROGRAM

## 2021-06-03 RX ORDER — SODIUM POLYSTYRENE SULFONATE 15 G/60ML
15 SUSPENSION ORAL; RECTAL ONCE
Status: COMPLETED | OUTPATIENT
Start: 2021-06-03 | End: 2021-06-03

## 2021-06-03 RX ORDER — HYDRALAZINE HYDROCHLORIDE 20 MG/ML
10 INJECTION INTRAMUSCULAR; INTRAVENOUS ONCE
Status: COMPLETED | OUTPATIENT
Start: 2021-06-03 | End: 2021-06-03

## 2021-06-03 RX ORDER — HYDRALAZINE HYDROCHLORIDE 20 MG/ML
10 INJECTION INTRAMUSCULAR; INTRAVENOUS EVERY 6 HOURS PRN
Status: DISCONTINUED | OUTPATIENT
Start: 2021-06-03 | End: 2021-06-09 | Stop reason: HOSPADM

## 2021-06-03 RX ORDER — NAPROXEN 250 MG/1
250 TABLET ORAL 2 TIMES DAILY PRN
COMMUNITY
End: 2021-06-09 | Stop reason: HOSPADM

## 2021-06-03 RX ORDER — HYDRALAZINE HYDROCHLORIDE 20 MG/ML
10 INJECTION INTRAMUSCULAR; INTRAVENOUS
Status: DISCONTINUED | OUTPATIENT
Start: 2021-06-03 | End: 2021-06-03

## 2021-06-03 RX ADMIN — HEPARIN SODIUM 5000 UNITS: 5000 INJECTION INTRAVENOUS; SUBCUTANEOUS at 05:04

## 2021-06-03 RX ADMIN — METOPROLOL TARTRATE 100 MG: 50 TABLET, FILM COATED ORAL at 21:30

## 2021-06-03 RX ADMIN — CETIRIZINE HYDROCHLORIDE 10 MG: 10 TABLET, FILM COATED ORAL at 08:59

## 2021-06-03 RX ADMIN — LISINOPRIL 10 MG: 5 TABLET ORAL at 08:59

## 2021-06-03 RX ADMIN — INSULIN ASPART 10 UNITS: 100 INJECTION, SOLUTION INTRAVENOUS; SUBCUTANEOUS at 16:49

## 2021-06-03 RX ADMIN — INSULIN ASPART 12 UNITS: 100 INJECTION, SOLUTION INTRAVENOUS; SUBCUTANEOUS at 16:48

## 2021-06-03 RX ADMIN — PANTOPRAZOLE SODIUM 40 MG: 40 TABLET, DELAYED RELEASE ORAL at 21:31

## 2021-06-03 RX ADMIN — INSULIN ASPART 10 UNITS: 100 INJECTION, SOLUTION INTRAVENOUS; SUBCUTANEOUS at 13:27

## 2021-06-03 RX ADMIN — CYCLOBENZAPRINE 10 MG: 10 TABLET, FILM COATED ORAL at 03:02

## 2021-06-03 RX ADMIN — BUPROPION HYDROCHLORIDE 150 MG: 150 TABLET, EXTENDED RELEASE ORAL at 09:46

## 2021-06-03 RX ADMIN — IPRATROPIUM BROMIDE AND ALBUTEROL SULFATE 3 ML: 2.5; .5 SOLUTION RESPIRATORY (INHALATION) at 14:14

## 2021-06-03 RX ADMIN — CEFTRIAXONE SODIUM 1 G: 1 INJECTION, POWDER, FOR SOLUTION INTRAMUSCULAR; INTRAVENOUS at 00:37

## 2021-06-03 RX ADMIN — METOPROLOL TARTRATE 100 MG: 50 TABLET, FILM COATED ORAL at 08:59

## 2021-06-03 RX ADMIN — BUDESONIDE AND FORMOTEROL FUMARATE DIHYDRATE 2 PUFF: 160; 4.5 AEROSOL RESPIRATORY (INHALATION) at 06:54

## 2021-06-03 RX ADMIN — GABAPENTIN 800 MG: 400 CAPSULE ORAL at 21:29

## 2021-06-03 RX ADMIN — IPRATROPIUM BROMIDE AND ALBUTEROL SULFATE 3 ML: 2.5; .5 SOLUTION RESPIRATORY (INHALATION) at 06:54

## 2021-06-03 RX ADMIN — ASPIRIN 81 MG: 81 TABLET, FILM COATED ORAL at 08:58

## 2021-06-03 RX ADMIN — BUMETANIDE 1 MG: 0.25 INJECTION INTRAMUSCULAR; INTRAVENOUS at 08:59

## 2021-06-03 RX ADMIN — SODIUM CHLORIDE, PRESERVATIVE FREE 10 ML: 5 INJECTION INTRAVENOUS at 21:46

## 2021-06-03 RX ADMIN — FERROUS SULFATE TAB EC 324 MG (65 MG FE EQUIVALENT) 324 MG: 324 (65 FE) TABLET DELAYED RESPONSE at 08:59

## 2021-06-03 RX ADMIN — CLOPIDOGREL BISULFATE 75 MG: 75 TABLET ORAL at 08:59

## 2021-06-03 RX ADMIN — SODIUM BICARBONATE 650 MG: 650 TABLET ORAL at 09:46

## 2021-06-03 RX ADMIN — MONTELUKAST SODIUM 10 MG: 10 TABLET, COATED ORAL at 21:29

## 2021-06-03 RX ADMIN — NYSTATIN: 100000 POWDER TOPICAL at 09:47

## 2021-06-03 RX ADMIN — HEPARIN SODIUM 5000 UNITS: 5000 INJECTION INTRAVENOUS; SUBCUTANEOUS at 13:26

## 2021-06-03 RX ADMIN — NYSTATIN: 100000 POWDER TOPICAL at 16:37

## 2021-06-03 RX ADMIN — OXYBUTYNIN CHLORIDE 5 MG: 5 TABLET, EXTENDED RELEASE ORAL at 08:59

## 2021-06-03 RX ADMIN — BUDESONIDE AND FORMOTEROL FUMARATE DIHYDRATE 2 PUFF: 160; 4.5 AEROSOL RESPIRATORY (INHALATION) at 20:13

## 2021-06-03 RX ADMIN — INSULIN ASPART 10 UNITS: 100 INJECTION, SOLUTION INTRAVENOUS; SUBCUTANEOUS at 09:01

## 2021-06-03 RX ADMIN — SUCRALFATE 1 G: 1 TABLET ORAL at 08:59

## 2021-06-03 RX ADMIN — SODIUM POLYSTYRENE SULFONATE 15 G: 15 SUSPENSION ORAL; RECTAL at 09:00

## 2021-06-03 RX ADMIN — CYCLOBENZAPRINE 10 MG: 10 TABLET, FILM COATED ORAL at 22:30

## 2021-06-03 RX ADMIN — NYSTATIN: 100000 POWDER TOPICAL at 21:46

## 2021-06-03 RX ADMIN — ATORVASTATIN CALCIUM 40 MG: 40 TABLET, FILM COATED ORAL at 21:30

## 2021-06-03 RX ADMIN — INSULIN DETEMIR 60 UNITS: 100 INJECTION, SOLUTION SUBCUTANEOUS at 21:34

## 2021-06-03 RX ADMIN — INSULIN ASPART 10 UNITS: 100 INJECTION, SOLUTION INTRAVENOUS; SUBCUTANEOUS at 12:32

## 2021-06-03 RX ADMIN — ISOSORBIDE MONONITRATE 30 MG: 30 TABLET, EXTENDED RELEASE ORAL at 08:59

## 2021-06-03 RX ADMIN — IPRATROPIUM BROMIDE AND ALBUTEROL SULFATE 3 ML: 2.5; .5 SOLUTION RESPIRATORY (INHALATION) at 20:13

## 2021-06-03 RX ADMIN — SODIUM BICARBONATE 650 MG: 650 TABLET ORAL at 21:30

## 2021-06-03 RX ADMIN — IPRATROPIUM BROMIDE AND ALBUTEROL SULFATE 3 ML: 2.5; .5 SOLUTION RESPIRATORY (INHALATION) at 11:37

## 2021-06-03 RX ADMIN — DULOXETINE HYDROCHLORIDE 20 MG: 20 CAPSULE, DELAYED RELEASE ORAL at 08:59

## 2021-06-03 RX ADMIN — HEPARIN SODIUM 5000 UNITS: 5000 INJECTION INTRAVENOUS; SUBCUTANEOUS at 21:30

## 2021-06-03 RX ADMIN — SODIUM CHLORIDE, PRESERVATIVE FREE 10 ML: 5 INJECTION INTRAVENOUS at 09:46

## 2021-06-03 RX ADMIN — HYDRALAZINE HYDROCHLORIDE 10 MG: 20 INJECTION INTRAMUSCULAR; INTRAVENOUS at 00:37

## 2021-06-03 RX ADMIN — BUMETANIDE 1 MG: 0.25 INJECTION INTRAMUSCULAR; INTRAVENOUS at 21:30

## 2021-06-03 NOTE — PROGRESS NOTES
FAMILY MEDICINE DAILY PROGRESS NOTE  NAME: Yamileth Slater  : 1960  MRN: [id number]     LOS: 1 day     PROVIDER OF SERVICE: Nirmal Calvillo MD    Chief Complaint: Acute on chronic respiratory failure with hypoxia and hypercapnia (CMS/HCC)    Subjective:     Interval History:  History taken from: patient chart  VSS with no acute events overnight. Increased oxygen requirement from home but patient states feels improved. Potassium elevated has received calcium gluconate, and kayexalate. Hgb stable at 8.7. Magnesium low at 1.5. Monitoring ERIKA with Cr improving. Will continue to monitor.     Review of Systems:   Review of Systems   Constitutional: Negative for activity change and appetite change.   HENT: Negative for congestion and trouble swallowing.    Respiratory: Positive for shortness of breath. Negative for chest tightness.    Cardiovascular: Negative for chest pain and palpitations.   Gastrointestinal: Negative for abdominal distention and abdominal pain.   Genitourinary: Negative for difficulty urinating and dysuria.   Musculoskeletal: Negative for arthralgias and myalgias.   Skin: Negative for color change and pallor.   Neurological: Negative for dizziness, light-headedness and headaches.   Psychiatric/Behavioral: Negative for agitation and behavioral problems.       Objective:     Vital Signs  Temp:  [97.4 °F (36.3 °C)-98.4 °F (36.9 °C)] 97.5 °F (36.4 °C)  Heart Rate:  [56-78] 78  Resp:  [20-27] 27  BP: (142-230)/(62-98) 173/72    Physical Exam  Physical Exam  Constitutional:       General: She is not in acute distress.     Appearance: She is well-developed.   HENT:      Head: Normocephalic and atraumatic.      Right Ear: External ear normal.      Left Ear: External ear normal.      Nose: Nose normal.   Eyes:      Extraocular Movements: Extraocular movements intact.      Pupils: Pupils are equal, round, and reactive to light.   Cardiovascular:      Rate and Rhythm: Normal rate and regular  rhythm.      Heart sounds: Normal heart sounds. No murmur heard.   No friction rub. No gallop.    Pulmonary:      Effort: Pulmonary effort is normal. No respiratory distress.      Breath sounds: Normal breath sounds. No wheezing or rales.   Abdominal:      General: Bowel sounds are normal. There is no distension.      Palpations: Abdomen is soft.      Tenderness: There is no abdominal tenderness.   Musculoskeletal:         General: Normal range of motion.      Cervical back: Normal range of motion and neck supple.      Right lower leg: Edema (Mild) present.      Left lower leg: Edema (Mild) present.   Skin:     General: Skin is warm.      Findings: No erythema.   Neurological:      Mental Status: She is alert and oriented to person, place, and time. Mental status is at baseline.   Psychiatric:         Mood and Affect: Mood normal.         Behavior: Behavior normal.         Medication Review    Current Facility-Administered Medications:   •  aspirin EC tablet 81 mg, 81 mg, Oral, Daily, Haily Mcneil MD  •  atorvastatin (LIPITOR) tablet 40 mg, 40 mg, Oral, Nightly, Haily Mcneil MD  •  budesonide-formoterol (SYMBICORT) 160-4.5 MCG/ACT inhaler 2 puff, 2 puff, Inhalation, BID - RT, Haily Mcneil MD, 2 puff at 06/03/21 0654  •  bumetanide (BUMEX) injection 1 mg, 1 mg, Intravenous, Q12H, Haily Mcneil MD, 1 mg at 06/02/21 2015  •  buPROPion SR (WELLBUTRIN SR) 12 hr tablet 150 mg, 150 mg, Oral, BID, Haily Mcneil MD, 150 mg at 06/02/21 2114  •  cefTRIAXone (ROCEPHIN) 1 g/100 mL 0.9% NS (MBP), 1 g, Intravenous, Q24H, Haily Mcneil MD, Stopped at 06/03/21 0120  •  cetirizine (zyrTEC) tablet 10 mg, 10 mg, Oral, Daily, Haily Mcneil MD  •  clopidogrel (PLAVIX) tablet 75 mg, 75 mg, Oral, Daily, Haily Mcneil MD  •  cyclobenzaprine (FLEXERIL) tablet 10 mg, 10 mg, Oral, BID PRN, Haily Mcneil MD, 10 mg at 06/03/21 0302  •  dextrose (D50W) 25 g/ 50mL Intravenous Solution 25 g, 25 g, Intravenous, Q15 Min PRNEwa,  MD Haily  •  dextrose (GLUTOSE) oral gel 15 g, 15 g, Oral, Q15 Min PRN, Haily Mcneil MD  •  DULoxetine (CYMBALTA) DR capsule 20 mg, 20 mg, Oral, Daily, Haily Mcneil MD  •  ferrous sulfate EC tablet 324 mg, 324 mg, Oral, Daily With Breakfast, Haily Mcneil MD  •  gabapentin (NEURONTIN) capsule 800 mg, 800 mg, Oral, Nightly, Haily Mcneil MD, 800 mg at 06/02/21 2017  •  glucagon (human recombinant) (GLUCAGEN DIAGNOSTIC) injection 1 mg, 1 mg, Subcutaneous, Q15 Min PRN, Haily Mcneil MD  •  heparin (porcine) 5000 UNIT/ML injection 5,000 Units, 5,000 Units, Subcutaneous, Q8H, Haily Mcneil MD, 5,000 Units at 06/03/21 0504  •  hydrALAZINE (APRESOLINE) injection 10 mg, 10 mg, Intravenous, Q2H PRN, Haily Mcneil MD  •  insulin aspart (novoLOG) injection 0-14 Units, 0-14 Units, Subcutaneous, TID AC, Haily Mcneil MD  •  ipratropium-albuterol (DUO-NEB) nebulizer solution 3 mL, 3 mL, Nebulization, 4x Daily - RT, Haily Mcneil MD, 3 mL at 06/03/21 0654  •  isosorbide mononitrate (IMDUR) 24 hr tablet 30 mg, 30 mg, Oral, Daily, Haily Mcneil MD  •  lisinopril (PRINIVIL,ZESTRIL) tablet 10 mg, 10 mg, Oral, Daily, Haily Mcneil MD  •  metoprolol tartrate (LOPRESSOR) tablet 100 mg, 100 mg, Oral, Q12H, Haily Mcneil MD, 100 mg at 06/02/21 2016  •  montelukast (SINGULAIR) tablet 10 mg, 10 mg, Oral, Nightly, Haily Mcenil MD, 10 mg at 06/02/21 2114  •  nystatin (MYCOSTATIN) powder, , Topical, TID, Haily Mcneil MD, Given at 06/02/21 2114  •  ondansetron (ZOFRAN) injection 4 mg, 4 mg, Intravenous, Q6H PRN, Haily Mcneil MD  •  oxybutynin XL (DITROPAN-XL) 24 hr tablet 5 mg, 5 mg, Oral, Daily, Haily Mcneil MD  •  pantoprazole (PROTONIX) EC tablet 40 mg, 40 mg, Oral, Nightly, Haily Mcneil MD, 40 mg at 06/02/21 2017  •  sodium bicarbonate tablet 650 mg, 650 mg, Oral, BID, Haily Mcneil MD, 650 mg at 06/02/21 2114  •  [COMPLETED] Insert peripheral IV, , , Once **AND** sodium chloride 0.9 % flush 10 mL, 10 mL,  Intravenous, PRN, Haily Mcneil MD  •  sodium chloride 0.9 % flush 10 mL, 10 mL, Intravenous, Q12H, Haily Mcneil MD, 10 mL at 06/02/21 2017  •  sodium chloride 0.9 % flush 10 mL, 10 mL, Intravenous, PRN, Haily Mcneil MD  •  sodium polystyrene (KAYEXALATE) 15 GM/60ML suspension 15 g, 15 g, Oral, Once, Nirmal Calvillo MD  •  sucralfate (CARAFATE) tablet 1 g, 1 g, Oral, 4x Daily, Haily Mcneil MD, 1 g at 06/02/21 2114    Current Outpatient Medications:   •  albuterol sulfate  (90 Base) MCG/ACT inhaler, Inhale 2 puffs Every 4 (Four) Hours As Needed for Wheezing., Disp: 18 g, Rfl: 1  •  aspirin (aspirin) 81 MG EC tablet, Take 1 tablet by mouth Daily., Disp: 60 tablet, Rfl: 5  •  atorvastatin (LIPITOR) 40 MG tablet, Take 1 tablet by mouth Daily. (Patient taking differently: Take 20 mg by mouth Daily.), Disp: 90 tablet, Rfl: 0  •  Blood Glucose Monitoring Suppl ([employer] Blood Glucose Meter) w/Device kit, 1 each 3 (Three) Times a Day., Disp: 1 kit, Rfl: 3  •  budesonide-formoterol (SYMBICORT) 160-4.5 MCG/ACT inhaler, Inhale 2 puffs 2 (Two) Times a Day., Disp: , Rfl:   •  buPROPion SR (Wellbutrin SR) 150 MG 12 hr tablet, Take 150 mg by mouth 2 (Two) Times a Day., Disp: , Rfl:   •  cetirizine (zyrTEC) 10 MG tablet, Take 1 tablet by mouth Daily., Disp: 30 tablet, Rfl: 3  •  clopidogrel (PLAVIX) 75 MG tablet, Take 1 tablet by mouth Daily., Disp: 30 tablet, Rfl: 5  •  Continuous Blood Gluc  (FreeStyle Jade 2 Eola) device, 1 each Continuous., Disp: 1 each, Rfl: 0  •  Continuous Blood Gluc Sensor (FreeStyle Jade 2 Sensor) misc, 1 each Every 14 (Fourteen) Days., Disp: 2 each, Rfl: 11  •  cyclobenzaprine (FLEXERIL) 10 MG tablet, Take 1 tablet by mouth 2 (Two) Times a Day As Needed for Muscle Spasms., Disp: 60 tablet, Rfl: 5  •  DULoxetine (Cymbalta) 20 MG capsule, Take 1 capsule by mouth Daily., Disp: 30 capsule, Rfl: 0  •  EASY TOUCH PEN NEEDLES 31G X 8 MM misc, , Disp: , Rfl:   •  Empagliflozin  (Jardiance) 25 MG tablet, Take 25 mg by mouth Daily., Disp: 90 tablet, Rfl: 5  •  ferrous sulfate (FeroSul) 325 (65 FE) MG tablet, Take 1 tablet by mouth Daily With Breakfast., Disp: 30 tablet, Rfl: 3  •  gabapentin (NEURONTIN) 800 MG tablet, Take 1 tablet by mouth 3 (Three) Times a Day., Disp: 90 tablet, Rfl: 0  •  glucose blood test strip, Use as instructed, Disp: 100 each, Rfl: 12  •  insulin aspart (novoLOG FLEXPEN) 100 UNIT/ML solution pen-injector sc pen, Inject 20 Units under the skin into the appropriate area as directed 3 (Three) Times a Day With Meals., Disp: 15 mL, Rfl: 1  •  Insulin Glargine (Lantus SoloStar) 100 UNIT/ML injection pen, Inject 30 Units under the skin into the appropriate area as directed Every 12 (Twelve) Hours **100 day supply** (Patient taking differently: Inject 95 Units under the skin into the appropriate area as directed Every 12 (Twelve) Hours.), Disp: 60 mL, Rfl: 11  •  Insulin Pen Needle (NovoFine) 30G X 8 MM misc, As directed 4 times daily, Disp: 100 each, Rfl: 11  •  ipratropium-albuterol (DUO-NEB) 0.5-2.5 mg/3 ml nebulizer, Take 3 mL by nebulization 4 (Four) Times a Day., Disp: , Rfl:   •  isosorbide mononitrate (IMDUR) 30 MG 24 hr tablet, Take 1 tablet by mouth Daily., Disp: 90 tablet, Rfl: 2  •  lidocaine (LIDODERM) 5 %, PLACE 1 PATCH ON THE SKIN AS DIRECTED BY PROVIDER DAILY. REMOVE & DISCARD PATCH WITHIN 12 HOURS OR AS DIRECTED BY MD, Disp: 30 patch, Rfl: 3  •  lisinopril (PRINIVIL,ZESTRIL) 10 MG tablet, Take 1 tablet by mouth Daily., Disp: 30 tablet, Rfl: 5  •  metoprolol tartrate (LOPRESSOR) 50 MG tablet, Take 50 mg by mouth 2 (Two) Times a Day., Disp: , Rfl:   •  montelukast (SINGULAIR) 10 MG tablet, Take 1 tablet by mouth Every Night., Disp: 30 tablet, Rfl: 5  •  nystatin (MYCOSTATIN) 536000 UNIT/GM powder, Apply  topically to the appropriate area as directed 3 (Three) Times a Day., Disp: 15 g, Rfl: 1  •  ondansetron ODT (Zofran ODT) 4 MG disintegrating tablet, Place 1  tablet on the tongue Every 8 (Eight) Hours As Needed for Nausea or Vomiting., Disp: 30 tablet, Rfl: 0  •  oxybutynin XL (DITROPAN-XL) 5 MG 24 hr tablet, Take 1 tablet by mouth Daily., Disp: 90 tablet, Rfl: 1  •  pantoprazole (PROTONIX) 40 MG EC tablet, Take 1 tablet by mouth Every Night., Disp: 30 tablet, Rfl: 5  •  potassium chloride (MICRO-K) 10 MEQ CR capsule, Take 1 capsule by mouth Daily., Disp: 30 capsule, Rfl: 5  •  promethazine (PHENERGAN) 25 MG tablet, Take 1 tablet by mouth Every 6 (Six) Hours As Needed for Nausea or Vomiting., Disp: 30 tablet, Rfl: 0  •  sodium bicarbonate 650 MG tablet, Take 1 tablet by mouth 2 (Two) Times a Day., Disp: 60 tablet, Rfl: 1  •  sucralfate (CARAFATE) 1 g tablet, Take 1 tablet by mouth 4 (Four) Times a Day., Disp: 120 tablet, Rfl: 3  •  metoprolol tartrate (LOPRESSOR) 100 MG tablet, Take 1 tablet by mouth Every 12 (Twelve) Hours for 30 days. (Patient taking differently: Take 100 mg by mouth 2 (Two) Times a Day.), Disp: 60 tablet, Rfl: 0  •  nitroglycerin (NITROSTAT) 0.4 MG SL tablet, Place 0.4 mg under the tongue Every 5 (Five) Minutes As Needed for Chest Pain (x 3 doses)., Disp: , Rfl:      Diagnostic Data    Lab Results (last 24 hours)     Procedure Component Value Units Date/Time    Mycoplasma Pneumoniae PCR - Swab, Throat [[id number]] Collected: 06/03/21 0059    Specimen: Swab from Throat Updated: 06/03/21 0651     MYCOPLASMAE PNEUMONIAE BY PCR Negative    Narrative:      This test is performed by utilizing real time polymerace chain recation (PCR).     Procalcitonin [[id number]]  (Normal) Collected: 06/03/21 0558    Specimen: Blood Updated: 06/03/21 0633     Procalcitonin 0.12 ng/mL     Narrative:      As a Marker for Sepsis (Non-Neonates):     1. <0.5 ng/mL represents a low risk of severe sepsis and/or septic shock.  2. >2 ng/mL represents a high risk of severe sepsis and/or septic shock.    As a Marker for Lower Respiratory Tract Infections that require antibiotic  "therapy:  PCT on Admission     Antibiotic Therapy             6-12 Hrs later  >0.5                          Strongly Recommended            >0.25 - <0.5             Recommended  0.1 - 0.25                  Discouraged                       Remeasure/reassess PCT  <0.1                         Strongly Discouraged         Remeasure/reassess PCT      As 28 day mortality risk marker: \"Change in Procalcitonin Result\" (>80% or <=80%) if Day 0 (or Day 1) and Day 4 values are available. Refer to http://[email or web address]-calculator.com/    Change in PCT <=80 %   A decrease of PCT levels below or equal to 80% defines a positive change in PCT test result representing a higher risk for 28-day all-cause mortality of patients diagnosed with severe sepsis or septic shock.    Change in PCT >80 %   A decrease of PCT levels of more than 80% defines a negative change in PCT result representing a lower risk for 28-day all-cause mortality of patients diagnosed with severe sepsis or septic shock.              Results may be falsely decreased if patient taking Biotin.     Comprehensive Metabolic Panel [[id number]]  (Abnormal) Collected: 06/03/21 0558    Specimen: Blood Updated: 06/03/21 0631     Glucose 228 mg/dL      BUN 39 mg/dL      Creatinine 2.26 mg/dL      Sodium 139 mmol/L      Potassium 6.2 mmol/L      Chloride 110 mmol/L      CO2 21.0 mmol/L      Calcium 9.0 mg/dL      Total Protein 7.9 g/dL      Albumin 3.30 g/dL      ALT (SGPT) 9 U/L      AST (SGOT) 12 U/L      Alkaline Phosphatase 114 U/L      Total Bilirubin 0.2 mg/dL      eGFR Non African Amer 22 mL/min/1.73      Globulin 4.6 gm/dL      A/G Ratio 0.7 g/dL      BUN/Creatinine Ratio 17.3     Anion Gap 8.0 mmol/L     Narrative:      GFR Normal >60  Chronic Kidney Disease <60  Kidney Failure <15      Magnesium [[id number]]  (Abnormal) Collected: 06/03/21 0558    Specimen: Blood Updated: 06/03/21 0627     Magnesium 1.5 mg/dL     CBC Auto Differential [[id number]]  (Abnormal) Collected: " 06/03/21 0558    Specimen: Blood Updated: 06/03/21 0621     WBC 8.65 10*3/mm3      RBC 3.07 10*6/mm3      Hemoglobin 8.7 g/dL      Hematocrit 28.8 %      MCV 93.8 fL      MCH 28.3 pg      MCHC 30.2 g/dL      RDW 18.5 %      RDW-SD 60.0 fl      MPV 9.0 fL      Platelets 253 10*3/mm3      Neutrophil % 81.2 %      Lymphocyte % 16.0 %      Monocyte % 1.5 %      Eosinophil % 0.0 %      Basophil % 0.3 %      Immature Grans % 1.0 %      Neutrophils, Absolute 7.02 10*3/mm3      Lymphocytes, Absolute 1.38 10*3/mm3      Monocytes, Absolute 0.13 10*3/mm3      Eosinophils, Absolute 0.00 10*3/mm3      Basophils, Absolute 0.03 10*3/mm3      Immature Grans, Absolute 0.09 10*3/mm3      nRBC 0.3 /100 WBC     Blood Culture - Blood, Arm, Right [[id number]] Collected: 06/03/21 0558    Specimen: Blood from Arm, Right Updated: 06/03/21 0603    Legionella Antigen, Urine - Urine, Urine, Clean Catch [[id number]]  (Normal) Collected: 06/02/21 2202    Specimen: Urine, Clean Catch Updated: 06/02/21 2335     LEGIONELLA ANTIGEN, URINE Negative    S. Pneumo Ag Urine or CSF - Urine, Urine, Clean Catch [[id number]]  (Normal) Collected: 06/02/21 2202    Specimen: Urine, Clean Catch Updated: 06/02/21 2334     Strep Pneumo Ag Negative    Lipid Panel [[id number]]  (Abnormal) Collected: 06/02/21 1646    Specimen: Blood Updated: 06/02/21 2256     Total Cholesterol 130 mg/dL      Triglycerides 338 mg/dL      HDL Cholesterol 39 mg/dL      LDL Cholesterol  41 mg/dL      VLDL Cholesterol 50 mg/dL      LDL/HDL Ratio 0.60    Narrative:      Cholesterol Reference Ranges  (U.S. Department of Health and Human Services ATP III Classifications)    Desirable          <200 mg/dL  Borderline High    200-239 mg/dL  High Risk          >240 mg/dL      Triglyceride Reference Ranges  (U.S. Department of Health and Human Services ATP III Classifications)    Normal           <150 mg/dL  Borderline High  150-199 mg/dL  High             200-499 mg/dL  Very High        >500  mg/dL    HDL Reference Ranges  (U.S. Department of Health and Human Services ATP III Classifcations)    Low     <40 mg/dl (major risk factor for CHD)  High    >60 mg/dl ('negative' risk factor for CHD)        LDL Reference Ranges  (U.S. Department of Health and Human Services ATP III Classifcations)    Optimal          <100 mg/dL  Near Optimal     100-129 mg/dL  Borderline High  130-159 mg/dL  High             160-189 mg/dL  Very High        >189 mg/dL    Troponin [[id number]]  (Normal) Collected: 06/02/21 1646    Specimen: Blood Updated: 06/02/21 1711     Troponin T <0.010 ng/mL     Narrative:      Troponin T Reference Range:  <= 0.03 ng/mL-   Negative for AMI  >0.03 ng/mL-     Abnormal for myocardial necrosis.  Clinicians would have to utilize clinical acumen, EKG, Troponin and serial changes to determine if it is an Acute Myocardial Infarction or myocardial injury due to an underlying chronic condition.       Results may be falsely decreased if patient taking Biotin.      Extra Tubes [[id number]] Collected: 06/02/21 1557    Specimen: Blood, Venous Line Updated: 06/02/21 1700    Narrative:      The following orders were created for panel order Extra Tubes.  Procedure                               Abnormality         Status                     ---------                               -----------         ------                     Gold Top - SST[[id number]]                                   Final result                 Please view results for these tests on the individual orders.    Gold Top - SST [[id number]] Collected: 06/02/21 1557    Specimen: Blood Updated: 06/02/21 1700     Extra Tube Hold for add-ons.     Comment: Auto resulted.       Blood Culture - Blood, Arm, Left [[id number]] Collected: 06/02/21 1646    Specimen: Blood from Arm, Left Updated: 06/02/21 1652    Blood Gas, Arterial With Co-Ox [[id number]]  (Abnormal) Collected: 06/02/21 1645    Specimen: Arterial Blood Updated: 06/02/21 1645     Site Arterial Line      Shadi's Test N/A     pH, Arterial 7.256 pH units      Comment: 84 Value below reference range        pCO2, Arterial 48.4 mm Hg      Comment: 83 Value above reference range        pO2, Arterial 64.2 mm Hg      Comment: 84 Value below reference range        HCO3, Arterial 21.5 mmol/L      Base Excess, Arterial -5.6 mmol/L      Comment: 84 Value below reference range        O2 Saturation, Arterial 90.5 %      Comment: 84 Value below reference range        Hemoglobin, Blood Gas 10.9 g/dL      Comment: 84 Value below reference range        Hematocrit, Blood Gas 33.4 %      Comment: 84 Value below reference range        Oxyhemoglobin 88.9 %      Comment: 84 Value below reference range        Methemoglobin 0.10 %      Carboxyhemoglobin 1.7 %      A-a Gradiant 27.6 mmHg      Sodium, Arterial 141 mmol/L      Potassium, Arterial 5.7 mmol/L      Comment: 83 Value above reference range        Ionized Calcium 4.55 mg/dL      Glucose, Arterial 149 mmol/L      Barometric Pressure for Blood Gas 744 mmHg      Modality N/A     Ventilator Mode NA     Comment: Meter: [id number]     :  [id number]        Note --     pH, Temp Corrected --     pCO2, Temperature Corrected --     pO2, Temperature Corrected --    Comprehensive Metabolic Panel [[id number]]  (Abnormal) Collected: 06/02/21 1552    Specimen: Blood Updated: 06/02/21 1636     Glucose 143 mg/dL      BUN 34 mg/dL      Creatinine 2.33 mg/dL      Sodium 140 mmol/L      Potassium 6.1 mmol/L      Chloride 109 mmol/L      CO2 25.0 mmol/L      Calcium 8.6 mg/dL      Total Protein 7.5 g/dL      Albumin 3.60 g/dL      ALT (SGPT) 11 U/L      AST (SGOT) 14 U/L      Alkaline Phosphatase 129 U/L      Total Bilirubin 0.2 mg/dL      eGFR Non African Amer 21 mL/min/1.73      Globulin 3.9 gm/dL      A/G Ratio 0.9 g/dL      BUN/Creatinine Ratio 14.6     Anion Gap 6.0 mmol/L     Narrative:      GFR Normal >60  Chronic Kidney Disease <60  Kidney Failure <15      Troponin [[id number]]   (Normal) Collected: 06/02/21 1552    Specimen: Blood Updated: 06/02/21 1620     Troponin T 0.016 ng/mL     Narrative:      Troponin T Reference Range:  <= 0.03 ng/mL-   Negative for AMI  >0.03 ng/mL-     Abnormal for myocardial necrosis.  Clinicians would have to utilize clinical acumen, EKG, Troponin and serial changes to determine if it is an Acute Myocardial Infarction or myocardial injury due to an underlying chronic condition.       Results may be falsely decreased if patient taking Biotin.      Magnesium [[id number]]  (Abnormal) Collected: 06/02/21 1552    Specimen: Blood Updated: 06/02/21 1620     Magnesium 1.5 mg/dL     BNP [[id number]]  (Abnormal) Collected: 06/02/21 1552    Specimen: Blood Updated: 06/02/21 1617     proBNP 2,236.0 pg/mL     Narrative:      Among patients with dyspnea, NT-proBNP is highly sensitive for the detection of acute congestive heart failure. In addition NT-proBNP of <300 pg/ml effectively rules out acute congestive heart failure with 99% negative predictive value.    Results may be falsely decreased if patient taking Biotin.      CBC & Differential [[id number]]  (Abnormal) Collected: 06/02/21 1552    Specimen: Blood Updated: 06/02/21 1559    Narrative:      The following orders were created for panel order CBC & Differential.  Procedure                               Abnormality         Status                     ---------                               -----------         ------                     CBC Auto Differential[[id number]]        Abnormal            Final result                 Please view results for these tests on the individual orders.    CBC Auto Differential [[id number]]  (Abnormal) Collected: 06/02/21 1552    Specimen: Blood Updated: 06/02/21 1559     WBC 8.45 10*3/mm3      RBC 3.13 10*6/mm3      Hemoglobin 9.0 g/dL      Hematocrit 30.1 %      MCV 96.2 fL      MCH 28.8 pg      MCHC 29.9 g/dL      RDW 18.4 %      RDW-SD 60.6 fl      MPV 9.1 fL      Platelets 273 10*3/mm3       Neutrophil % 57.3 %      Lymphocyte % 31.2 %      Monocyte % 5.7 %      Eosinophil % 3.7 %      Basophil % 0.9 %      Immature Grans % 1.2 %      Neutrophils, Absolute 4.84 10*3/mm3      Lymphocytes, Absolute 2.64 10*3/mm3      Monocytes, Absolute 0.48 10*3/mm3      Eosinophils, Absolute 0.31 10*3/mm3      Basophils, Absolute 0.08 10*3/mm3      Immature Grans, Absolute 0.10 10*3/mm3      nRBC 0.6 /100 WBC     COVID-19 and FLU A/B PCR - Swab, Nasopharynx [[id number]]  (Normal) Collected: 06/02/21 1525    Specimen: Swab from Nasopharynx Updated: 06/02/21 1547     COVID19 Not Detected     Influenza A PCR Not Detected     Influenza B PCR Not Detected    Narrative:      Fact sheet for providers: https://www.fda.gov/media/562419/download    Fact sheet for patients: https://www.fda.gov/media/401836/download    Test performed by PCR.           Imaging Results (Last 24 Hours)     Procedure Component Value Units Date/Time    XR Chest 1 View [[id number]] Collected: 06/02/21 1514     Updated: 06/02/21 1542    Narrative:      PROCEDURE: Single view AP upright portable chest x-ray at 2:47  PM.    INDICATION: Shortness of breath.    COMPARISON: 5/16/2021 CT angiogram of the chest. 5/13/2021 chest  x-ray and earlier chest exams.    FINDINGS:    Stable sternal sutures.    Cardiomegaly with moderate vascular congestion.    Bilateral pulmonary infiltrates increased on left and similar to  slightly worse on the right compared to 5/13/2021.  Findings may represent changes of pulmonary edema and/or  superimposed pneumonia.      Right CP angle. Left CP angle sharp.      Impression:      Cardiomegaly with moderate vascular congestion.  Bilateral pulmonary infiltrates increased on the left similar to  increased on the right differential includes pulmonary edema  and/or superimposed pneumonia.    Mild blunting right CP angle.    [id number]    Electronically signed by:  Nirmal Verma MD  6/2/2021 3:41 PM  CDT Workstation: [id number]          I  reviewed the patient's new clinical results.    Assessment/Plan:     Active Hospital Problems    Diagnosis    • **Acute on chronic respiratory failure with hypoxia and hypercapnia (CMS/HCC)      - Duo nebs and albuterol as needed  · Patient brought her own BiPAP to wear at night  · O2 maintain sats between 88-92%  · One time solumedrol in ER  · Repeat Echo in am, last echo in April 2021 showed EF 61.3% with moderate concentric hypertrophy     • Acute pulmonary edema (CMS/HCC)      · CXR shows cardiomegaly and pulmonary infiltrates   · O2 nc at 5L with saturations 90% titrate to maintain saturations 88-92%  · Bumex 1mg bid  · Incentive spirometry  · Patient supplied BiPAP at night     • Hyperkalemia      · 6.1 in ER, EKG showed NSR with old RBBB  · Albuterol treatment  · Kayexalate   · Calcium gluconate 1g  · Consider insulin if not improving     • Morbid obesity (CMS/HCC)      · BMI 49.38  · Likely has obesity hypoventilation syndrome with poor respiratory effort  · Consistent carbohydrate/cardiac/renal diet     • Acute kidney injury superimposed on chronic kidney disease (CMS/HCC)      · Baseline creatinine 1.7 to 1.9 and GFR 25-30  · Hold nephrotoxic agents  · Trend creatinine  · IVF when fluid overload improves  · Continue bicarb tablets  · Continue Lisinopril 10mg       • Pneumonia of both lungs due to infectious organism      · Ceftriaxone started 6/2/21  · Atypicals negative   · Duo nebs and albuterol as needed  · Continue Symbicort  · ABG as needed  · CXR showed PNA vs congestion     • Acute renal failure superimposed on chronic kidney disease (CMS/HCC)      · Held Empagliflozin due to GFR < 30  · Bumex 1mg   · Trend creatinine  · Hold nephrotoxic agents  · Renal diet     • Uncontrolled type 2 diabetes mellitus with complication, with long-term current use of insulin (CMS/HCC)      · Home regimen held  · SSI     • Chronic obstructive pulmonary disease (CMS/HCC)      · Symbicort  · Duo nebs and albuterol as  needed  · Continue montelukast and cetirizine  · Incentive spirometry     • Mixed hyperlipidemia      · No lipid panel since 6/24/2020  · Repeat lipid panel   · Continue Atorvastatin 40mg     • Coronary artery disease      · Continue ASA 81mg, Atorvastatin 40mg, Clopidogrel 75mg, Imdur 30mg  · Last cath was 4/30/2021 showed multivessel dz with successful PCI of LM and proximal Cx with dug eluding stent, and PTCA of native ramus and PCI of proximal SVG to ramus with drug eluding stent.      • GERD (gastroesophageal reflux disease)      · Protonix 40mg IV     • Essential hypertension      · /89 in ER, prn hydralazine  · Continue Lisinopril 10mg, Metoprolol 100mg bid     • S/P CABG x 3      · Completed in 2013  · Continue ASA 81mg, Atorvastatin 40mg, Clopidogrel 75mg, Imdur 30mg, Lisinopril 10mg, Metoprolol 100mg bid     • PAD (peripheral artery disease) (CMS/[facility])      · Continue ASA 81mg, Clopidogrel 75mg, Atorvastatin 40mg           DVT prophylaxis: Heparin  Code Status and Medical Interventions:   Ordered at: 06/02/21 1824     Level Of Support Discussed With:    Patient     Code Status:    CPR     Medical Interventions (Level of Support Prior to Arrest):    Full       Plan for disposition:Where: current living arrangements and When:  3-4days      Time: 15 min           This document has been electronically signed by Nirmal Calvillo MD on Maida 3, 2021 08:00 CDT

## 2021-06-03 NOTE — PROGRESS NOTES
Discharge Planning Assessment  [facility]     Patient Name: Yamileth Slater  MRN: [id number]  Today's Date: 6/3/2021    Admit Date: 6/2/2021    Discharge Needs Assessment     Row Name 06/03/21 1457       Living Environment    Lives With  spouse    Current Living Arrangements  home/apartment/condo    Primary Care Provided by  spouse/significant other    Provides Primary Care For  no one, unable/limited ability to care for self    Family Caregiver if Needed  spouse    Quality of Family Relationships  helpful    Able to Return to Prior Arrangements  yes       Resource/Environmental Concerns    Resource/Environmental Concerns  reliable transportation    Transportation Concerns  rides, unreliable from others       Transition Planning    Patient/Family Anticipates Transition to  home with help/services    Patient/Family Anticipated Services at Transition  home health care    Transportation Anticipated  family or friend will provide;public transportation       Discharge Needs Assessment    Equipment Currently Used at Home  cane, quad;cane, straight;commode;noninvasive ventilator;oxygen;shower chair;walker, rolling;walker, standard    Concerns to be Addressed  denies needs/concerns at this time    Concerns Comments  states that they do nothave a working vehicle    Anticipated Changes Related to Illness  inability to care for self    Equipment Needed After Discharge  none    Outpatient/Agency/Support Group Needs  homecare agency    Discharge Facility/Level of Care Needs  home with home health    Patient's Choice of Community Agency(s)  active [facility]    Discharge Coordination/Progress  pt lives with spouse but has issues with transportation.  states that they do not have a working vehicle at home.   would qualify for pacs if car is not registered to their address.   has rx coverage,.   [facility]   states plans for gastric sleeve surgery and dm port.  johnson babb rn         Discharge Plan    No documentation.       Continued Care and Services - Admitted Since 6/2/2021    Coordination has not been started for this encounter.     Selected Continued Care - Prior Encounters Includes selections from prior encounters from 3/4/2021 to 6/3/2021    Discharged on 5/21/2021 Admission date: 5/13/2021 - Discharge disposition: Home-Health Care [facility]    Durable Medical Equipment     Service Provider Selected Services Address Phone Fax Patient Preferred    [facility]  Durable Medical Equipment [address] [facility] [address] [phone] --          Home Medical Care     Service Provider Selected Services Address Phone Fax Patient Preferred    [facility] [address]  [facility] [address] [phone] --                Discharged on 5/1/2021 Admission date: 4/24/2021 - Discharge disposition: Home-Health Care [facility]     Service Provider Selected Services Address Phone Fax Patient Preferred    [facility] [address] [address] [phone] --                Discharged on 4/23/2021 Admission date: 4/23/2021 - Discharge disposition: [facility] Hospital ([facility] - External)    Destination     Service Provider Selected Services Address Phone Fax Patient Preferred    [facility] [address] [facility] [address] [phone] -- --                Discharged on 4/12/2021 Admission date: 3/31/2021 - Discharge disposition: Home-Health Care [facility]    Home Medical Care     Service Provider Selected Services Address Phone Fax Patient Preferred    [facility] [address]  [facility] [address] [phone] --                      Demographic Summary     Row Name 06/03/21 9773       General Information     Admission Type  inpatient    Arrived From  home    Required Notices Provided  Important Message from Medicare    Referral Source  high risk screening lace 16    Reason for Consult  discharge planning    Preferred Language  English     Used During This Interaction  no    General Information Comments  confirmed face sheet and pharmacy        Functional Status    No documentation.       Psychosocial    No documentation.       Abuse/Neglect    No documentation.       Legal    No documentation.       Substance Abuse    No documentation.       Patient Forms    No documentation.           Noreen Chavez RN

## 2021-06-03 NOTE — ED NOTES
Assisted patient to bedside commode x1. Post ambulation patient wanted to put on home bipap. Call light within reach.      Kylah New RN  06/03/21 0892

## 2021-06-03 NOTE — OUTREACH NOTE
COPD/PN Week 3 Survey      Responses   [facility] patient discharged from?  [place]   Does the patient have one of the following disease processes/diagnoses(primary or secondary)?  COPD/Pneumonia   Was the primary reason for admission:  Pneumonia   Week 3 attempt successful?  No   Revoke  Readmitted          Melisa Rivera RN

## 2021-06-03 NOTE — PLAN OF CARE
Goal Outcome Evaluation:  Plan of Care Reviewed With: patient  Progress: no change  Outcome Summary: vss; no new complaints; no s/s of distress at this time; will continue to monitor

## 2021-06-04 LAB
ALBUMIN SERPL-MCNC: 3.3 G/DL (ref 3.5–5.2)
ALBUMIN/GLOB SERPL: 1 G/DL
ALP SERPL-CCNC: 100 U/L (ref 39–117)
ALT SERPL W P-5'-P-CCNC: 9 U/L (ref 1–33)
ANION GAP SERPL CALCULATED.3IONS-SCNC: 10 MMOL/L (ref 5–15)
AST SERPL-CCNC: 14 U/L (ref 1–32)
BASOPHILS # BLD AUTO: 0.08 10*3/MM3 (ref 0–0.2)
BASOPHILS NFR BLD AUTO: 1 % (ref 0–1.5)
BILIRUB SERPL-MCNC: 0.2 MG/DL (ref 0–1.2)
BUN SERPL-MCNC: 43 MG/DL (ref 8–23)
BUN/CREAT SERPL: 17.6 (ref 7–25)
CALCIUM SPEC-SCNC: 8.9 MG/DL (ref 8.6–10.5)
CHLORIDE SERPL-SCNC: 106 MMOL/L (ref 98–107)
CO2 SERPL-SCNC: 20 MMOL/L (ref 22–29)
CREAT SERPL-MCNC: 2.45 MG/DL (ref 0.57–1)
DEPRECATED RDW RBC AUTO: 62.8 FL (ref 37–54)
EOSINOPHIL # BLD AUTO: 0.14 10*3/MM3 (ref 0–0.4)
EOSINOPHIL NFR BLD AUTO: 1.7 % (ref 0.3–6.2)
ERYTHROCYTE [DISTWIDTH] IN BLOOD BY AUTOMATED COUNT: 18.7 % (ref 12.3–15.4)
GFR SERPL CREATININE-BSD FRML MDRD: 20 ML/MIN/1.73
GLOBULIN UR ELPH-MCNC: 3.3 GM/DL
GLUCOSE BLDC GLUCOMTR-MCNC: 119 MG/DL (ref 70–130)
GLUCOSE BLDC GLUCOMTR-MCNC: 150 MG/DL (ref 70–130)
GLUCOSE BLDC GLUCOMTR-MCNC: 237 MG/DL (ref 70–130)
GLUCOSE BLDC GLUCOMTR-MCNC: 261 MG/DL (ref 70–130)
GLUCOSE BLDC GLUCOMTR-MCNC: 73 MG/DL (ref 70–130)
GLUCOSE SERPL-MCNC: 84 MG/DL (ref 65–99)
HCT VFR BLD AUTO: 26.5 % (ref 34–46.6)
HGB BLD-MCNC: 7.9 G/DL (ref 12–15.9)
IMM GRANULOCYTES # BLD AUTO: 0.05 10*3/MM3 (ref 0–0.05)
IMM GRANULOCYTES NFR BLD AUTO: 0.6 % (ref 0–0.5)
LYMPHOCYTES # BLD AUTO: 3.01 10*3/MM3 (ref 0.7–3.1)
LYMPHOCYTES NFR BLD AUTO: 36.5 % (ref 19.6–45.3)
MCH RBC QN AUTO: 28.6 PG (ref 26.6–33)
MCHC RBC AUTO-ENTMCNC: 29.8 G/DL (ref 31.5–35.7)
MCV RBC AUTO: 96 FL (ref 79–97)
MONOCYTES # BLD AUTO: 0.67 10*3/MM3 (ref 0.1–0.9)
MONOCYTES NFR BLD AUTO: 8.1 % (ref 5–12)
NEUTROPHILS NFR BLD AUTO: 4.3 10*3/MM3 (ref 1.7–7)
NEUTROPHILS NFR BLD AUTO: 52.1 % (ref 42.7–76)
NRBC BLD AUTO-RTO: 0.5 /100 WBC (ref 0–0.2)
PLATELET # BLD AUTO: 273 10*3/MM3 (ref 140–450)
PMV BLD AUTO: 9.1 FL (ref 6–12)
POTASSIUM SERPL-SCNC: 4.9 MMOL/L (ref 3.5–5.2)
PROT SERPL-MCNC: 6.6 G/DL (ref 6–8.5)
RBC # BLD AUTO: 2.76 10*6/MM3 (ref 3.77–5.28)
SODIUM SERPL-SCNC: 136 MMOL/L (ref 136–145)
WBC # BLD AUTO: 8.25 10*3/MM3 (ref 3.4–10.8)

## 2021-06-04 PROCEDURE — 94799 UNLISTED PULMONARY SVC/PX: CPT

## 2021-06-04 PROCEDURE — 80053 COMPREHEN METABOLIC PANEL: CPT | Performed by: STUDENT IN AN ORGANIZED HEALTH CARE EDUCATION/TRAINING PROGRAM

## 2021-06-04 PROCEDURE — 63710000001 INSULIN ASPART PER 5 UNITS: Performed by: STUDENT IN AN ORGANIZED HEALTH CARE EDUCATION/TRAINING PROGRAM

## 2021-06-04 PROCEDURE — 85025 COMPLETE CBC W/AUTO DIFF WBC: CPT | Performed by: STUDENT IN AN ORGANIZED HEALTH CARE EDUCATION/TRAINING PROGRAM

## 2021-06-04 PROCEDURE — 25010000002 HEPARIN (PORCINE) PER 1000 UNITS: Performed by: STUDENT IN AN ORGANIZED HEALTH CARE EDUCATION/TRAINING PROGRAM

## 2021-06-04 PROCEDURE — 99232 SBSQ HOSP IP/OBS MODERATE 35: CPT | Performed by: STUDENT IN AN ORGANIZED HEALTH CARE EDUCATION/TRAINING PROGRAM

## 2021-06-04 PROCEDURE — 63710000001 INSULIN DETEMIR PER 5 UNITS: Performed by: STUDENT IN AN ORGANIZED HEALTH CARE EDUCATION/TRAINING PROGRAM

## 2021-06-04 PROCEDURE — 82962 GLUCOSE BLOOD TEST: CPT

## 2021-06-04 PROCEDURE — 25010000002 NA FERRIC GLUC CPLX PER 12.5 MG: Performed by: STUDENT IN AN ORGANIZED HEALTH CARE EDUCATION/TRAINING PROGRAM

## 2021-06-04 PROCEDURE — 94760 N-INVAS EAR/PLS OXIMETRY 1: CPT

## 2021-06-04 PROCEDURE — 99406 BEHAV CHNG SMOKING 3-10 MIN: CPT

## 2021-06-04 RX ADMIN — SODIUM CHLORIDE, PRESERVATIVE FREE 10 ML: 5 INJECTION INTRAVENOUS at 20:45

## 2021-06-04 RX ADMIN — GABAPENTIN 800 MG: 400 CAPSULE ORAL at 20:41

## 2021-06-04 RX ADMIN — CYCLOBENZAPRINE 10 MG: 10 TABLET, FILM COATED ORAL at 21:29

## 2021-06-04 RX ADMIN — IPRATROPIUM BROMIDE AND ALBUTEROL SULFATE 3 ML: 2.5; .5 SOLUTION RESPIRATORY (INHALATION) at 06:40

## 2021-06-04 RX ADMIN — FERROUS SULFATE TAB EC 324 MG (65 MG FE EQUIVALENT) 324 MG: 324 (65 FE) TABLET DELAYED RESPONSE at 08:27

## 2021-06-04 RX ADMIN — ISOSORBIDE MONONITRATE 30 MG: 30 TABLET, EXTENDED RELEASE ORAL at 08:27

## 2021-06-04 RX ADMIN — HEPARIN SODIUM 5000 UNITS: 5000 INJECTION INTRAVENOUS; SUBCUTANEOUS at 06:10

## 2021-06-04 RX ADMIN — SODIUM BICARBONATE 650 MG: 650 TABLET ORAL at 08:28

## 2021-06-04 RX ADMIN — NYSTATIN: 100000 POWDER TOPICAL at 20:52

## 2021-06-04 RX ADMIN — LISINOPRIL 10 MG: 5 TABLET ORAL at 08:28

## 2021-06-04 RX ADMIN — IPRATROPIUM BROMIDE AND ALBUTEROL SULFATE 3 ML: 2.5; .5 SOLUTION RESPIRATORY (INHALATION) at 14:25

## 2021-06-04 RX ADMIN — INSULIN DETEMIR 30 UNITS: 100 INJECTION, SOLUTION SUBCUTANEOUS at 21:19

## 2021-06-04 RX ADMIN — SODIUM BICARBONATE 650 MG: 650 TABLET ORAL at 20:42

## 2021-06-04 RX ADMIN — ATORVASTATIN CALCIUM 40 MG: 40 TABLET, FILM COATED ORAL at 20:42

## 2021-06-04 RX ADMIN — HEPARIN SODIUM 5000 UNITS: 5000 INJECTION INTRAVENOUS; SUBCUTANEOUS at 13:14

## 2021-06-04 RX ADMIN — MONTELUKAST SODIUM 10 MG: 10 TABLET, COATED ORAL at 20:42

## 2021-06-04 RX ADMIN — INSULIN ASPART 3 UNITS: 100 INJECTION, SOLUTION INTRAVENOUS; SUBCUTANEOUS at 17:16

## 2021-06-04 RX ADMIN — BUDESONIDE AND FORMOTEROL FUMARATE DIHYDRATE 2 PUFF: 160; 4.5 AEROSOL RESPIRATORY (INHALATION) at 19:32

## 2021-06-04 RX ADMIN — ASPIRIN 81 MG: 81 TABLET, FILM COATED ORAL at 08:27

## 2021-06-04 RX ADMIN — BUDESONIDE AND FORMOTEROL FUMARATE DIHYDRATE 2 PUFF: 160; 4.5 AEROSOL RESPIRATORY (INHALATION) at 06:40

## 2021-06-04 RX ADMIN — IPRATROPIUM BROMIDE AND ALBUTEROL SULFATE 3 ML: 2.5; .5 SOLUTION RESPIRATORY (INHALATION) at 19:32

## 2021-06-04 RX ADMIN — CLOPIDOGREL BISULFATE 75 MG: 75 TABLET ORAL at 08:28

## 2021-06-04 RX ADMIN — DULOXETINE HYDROCHLORIDE 20 MG: 20 CAPSULE, DELAYED RELEASE ORAL at 08:28

## 2021-06-04 RX ADMIN — BUMETANIDE 1 MG: 0.25 INJECTION INTRAMUSCULAR; INTRAVENOUS at 08:27

## 2021-06-04 RX ADMIN — PANTOPRAZOLE SODIUM 40 MG: 40 TABLET, DELAYED RELEASE ORAL at 20:42

## 2021-06-04 RX ADMIN — HEPARIN SODIUM 5000 UNITS: 5000 INJECTION INTRAVENOUS; SUBCUTANEOUS at 20:51

## 2021-06-04 RX ADMIN — NYSTATIN: 100000 POWDER TOPICAL at 15:04

## 2021-06-04 RX ADMIN — SODIUM CHLORIDE 250 MG: 9 INJECTION, SOLUTION INTRAVENOUS at 14:01

## 2021-06-04 RX ADMIN — OXYBUTYNIN CHLORIDE 5 MG: 5 TABLET, EXTENDED RELEASE ORAL at 08:28

## 2021-06-04 RX ADMIN — METOPROLOL TARTRATE 100 MG: 50 TABLET, FILM COATED ORAL at 20:41

## 2021-06-04 RX ADMIN — CETIRIZINE HYDROCHLORIDE 10 MG: 10 TABLET, FILM COATED ORAL at 08:27

## 2021-06-04 RX ADMIN — BUMETANIDE 1 MG: 0.25 INJECTION INTRAMUSCULAR; INTRAVENOUS at 20:44

## 2021-06-04 RX ADMIN — NYSTATIN: 100000 POWDER TOPICAL at 08:29

## 2021-06-04 RX ADMIN — SODIUM CHLORIDE, PRESERVATIVE FREE 10 ML: 5 INJECTION INTRAVENOUS at 08:26

## 2021-06-04 RX ADMIN — IPRATROPIUM BROMIDE AND ALBUTEROL SULFATE 3 ML: 2.5; .5 SOLUTION RESPIRATORY (INHALATION) at 11:28

## 2021-06-04 NOTE — PLAN OF CARE
Goal Outcome Evaluation:  Plan of Care Reviewed With: patient  Progress: improving  Outcome Summary: Vss. Up in the chair most of the shift. SOB at times noted. Plan for echo this afternoon. No c/o pain at this time or s/s of distress. Will continue to monitor this pt.

## 2021-06-04 NOTE — PROGRESS NOTES
FAMILY MEDICINE DAILY PROGRESS NOTE  NAME: Yamileth Slater  : 1960  MRN: [id number]     LOS: 2 days     PROVIDER OF SERVICE: Nirmal Calvillo MD    Chief Complaint: Acute on chronic respiratory failure with hypoxia and hypercapnia (CMS/HCC)    Subjective:     Interval History:  History taken from: patient chart  VSS, oxygen requirements decreasing no acute events overnight. Creatinine worsening, 2.26 to 2.45. Hgb decreased from 8.7 to 7.9. Decreased 2 lbs from yesterday.     Review of Systems:   Review of Systems   Constitutional: Negative for activity change and appetite change.   HENT: Negative for congestion and trouble swallowing.    Respiratory: Positive for shortness of breath. Negative for chest tightness.    Cardiovascular: Negative for chest pain and palpitations.   Gastrointestinal: Negative for abdominal distention and abdominal pain.   Genitourinary: Negative for difficulty urinating and dysuria.   Musculoskeletal: Negative for arthralgias and myalgias.   Skin: Negative for color change and pallor.   Neurological: Negative for dizziness, light-headedness and headaches.   Psychiatric/Behavioral: Negative for agitation and behavioral problems.       Objective:     Vital Signs  Temp:  [96.1 °F (35.6 °C)-97.8 °F (36.6 °C)] 97.7 °F (36.5 °C)  Heart Rate:  [55-89] 56  Resp:  [18-27] 18  BP: (121-173)/(59-73) 121/59    Physical Exam  Physical Exam  Constitutional:       General: She is not in acute distress.     Appearance: She is well-developed.   HENT:      Head: Normocephalic and atraumatic.      Right Ear: External ear normal.      Left Ear: External ear normal.      Nose: Nose normal.   Eyes:      Extraocular Movements: Extraocular movements intact.      Pupils: Pupils are equal, round, and reactive to light.   Cardiovascular:      Rate and Rhythm: Normal rate and regular rhythm.      Heart sounds: Normal heart sounds. No murmur heard.   No friction rub. No gallop.    Pulmonary:       Effort: Respiratory distress present.      Breath sounds: Normal breath sounds. No wheezing or rales.   Abdominal:      General: Bowel sounds are normal. There is no distension.      Palpations: Abdomen is soft.      Tenderness: There is no abdominal tenderness.   Musculoskeletal:         General: Normal range of motion.      Cervical back: Normal range of motion and neck supple.      Right lower leg: No edema.      Left lower leg: No edema.   Skin:     General: Skin is warm.      Findings: No erythema.   Neurological:      Mental Status: She is alert and oriented to person, place, and time. Mental status is at baseline.   Psychiatric:         Mood and Affect: Mood normal.         Behavior: Behavior normal.         Medication Review    Current Facility-Administered Medications:   •  aspirin EC tablet 81 mg, 81 mg, Oral, Daily, Haily Mcneil MD, 81 mg at 06/03/21 0858  •  atorvastatin (LIPITOR) tablet 40 mg, 40 mg, Oral, Nightly, Haily Mcneil MD, 40 mg at 06/03/21 2130  •  budesonide-formoterol (SYMBICORT) 160-4.5 MCG/ACT inhaler 2 puff, 2 puff, Inhalation, BID - RT, Haily Mcneil MD, 2 puff at 06/04/21 0640  •  bumetanide (BUMEX) injection 1 mg, 1 mg, Intravenous, Q12H, Haily Mcneil MD, 1 mg at 06/03/21 2130  •  cetirizine (zyrTEC) tablet 10 mg, 10 mg, Oral, Daily, Haily Mcneil MD, 10 mg at 06/03/21 0859  •  clopidogrel (PLAVIX) tablet 75 mg, 75 mg, Oral, Daily, Haily Mcneil MD, 75 mg at 06/03/21 0859  •  cyclobenzaprine (FLEXERIL) tablet 10 mg, 10 mg, Oral, BID PRN, Haily Mcneil MD, 10 mg at 06/03/21 2230  •  dextrose (D50W) 25 g/ 50mL Intravenous Solution 25 g, 25 g, Intravenous, Q15 Min PRN, Haily Mcneil MD  •  dextrose (GLUTOSE) oral gel 15 g, 15 g, Oral, Q15 Min PRN, Haily Mcneil MD  •  DULoxetine (CYMBALTA) DR capsule 20 mg, 20 mg, Oral, Daily, Haily Mcneil MD, 20 mg at 06/03/21 0859  •  ferrous sulfate EC tablet 324 mg, 324 mg, Oral, Daily With Breakfast, Haily Mcneil MD, 324 mg at  06/03/21 0859  •  gabapentin (NEURONTIN) capsule 800 mg, 800 mg, Oral, Nightly, Haily Mcneil MD, 800 mg at 06/03/21 2129  •  glucagon (human recombinant) (GLUCAGEN DIAGNOSTIC) injection 1 mg, 1 mg, Subcutaneous, Q15 Min PRN, Haily Mcneil MD  •  heparin (porcine) 5000 UNIT/ML injection 5,000 Units, 5,000 Units, Subcutaneous, Q8H, Haily Mcneil MD, 5,000 Units at 06/04/21 0610  •  hydrALAZINE (APRESOLINE) injection 10 mg, 10 mg, Intravenous, Q6H PRN, Nirmal Calvillo MD  •  insulin aspart (novoLOG) injection 0-14 Units, 0-14 Units, Subcutaneous, TID AC, Haily Mcneil MD, 12 Units at 06/03/21 1648  •  insulin aspart (novoLOG) injection 10 Units, 10 Units, Subcutaneous, TID With Meals, Nirmal Calvillo MD, 10 Units at 06/03/21 1649  •  insulin detemir (LEVEMIR) injection 60 Units, 60 Units, Subcutaneous, Nightly, Nirmal Calvillo MD, 60 Units at 06/03/21 2134  •  ipratropium-albuterol (DUO-NEB) nebulizer solution 3 mL, 3 mL, Nebulization, 4x Daily - RT, Haily Mcneil MD, 3 mL at 06/04/21 0640  •  isosorbide mononitrate (IMDUR) 24 hr tablet 30 mg, 30 mg, Oral, Daily, Haily Mcneil MD, 30 mg at 06/03/21 0859  •  lisinopril (PRINIVIL,ZESTRIL) tablet 10 mg, 10 mg, Oral, Daily, Haily Mcneil MD, 10 mg at 06/03/21 0859  •  metoprolol tartrate (LOPRESSOR) tablet 100 mg, 100 mg, Oral, Q12H, Haily Mcneil MD, 100 mg at 06/03/21 2130  •  montelukast (SINGULAIR) tablet 10 mg, 10 mg, Oral, Nightly, Haily Mcneil MD, 10 mg at 06/03/21 2129  •  nystatin (MYCOSTATIN) powder, , Topical, TID, Haily Mcneil MD, Given at 06/03/21 2146  •  ondansetron (ZOFRAN) injection 4 mg, 4 mg, Intravenous, Q6H PRN, Haily Mcneil MD  •  oxybutynin XL (DITROPAN-XL) 24 hr tablet 5 mg, 5 mg, Oral, Daily, Haily Mcneil MD, 5 mg at 06/03/21 0859  •  pantoprazole (PROTONIX) EC tablet 40 mg, 40 mg, Oral, Nightly, Haily Mcneil MD, 40 mg at 06/03/21 2131  •  sodium bicarbonate tablet 650 mg, 650 mg, Oral, BID, Haily Mcneil MD, 650  mg at 06/03/21 2130  •  [COMPLETED] Insert peripheral IV, , , Once **AND** sodium chloride 0.9 % flush 10 mL, 10 mL, Intravenous, PRN, Haily Mcneil MD  •  sodium chloride 0.9 % flush 10 mL, 10 mL, Intravenous, Q12H, Haily Mcneil MD, 10 mL at 06/03/21 2146  •  sodium chloride 0.9 % flush 10 mL, 10 mL, Intravenous, PRN, Haily Mcneil MD  •  sucralfate (CARAFATE) tablet 1 g, 1 g, Oral, 4x Daily, Haily Mcneil MD, Stopped at 06/03/21 1800     Diagnostic Data    Lab Results (last 24 hours)     Procedure Component Value Units Date/Time    POC Glucose Once [[id number]]  (Normal) Collected: 06/04/21 0549    Specimen: Blood Updated: 06/04/21 0619     Glucose 73 mg/dL      Comment: RN NotifiedOperator: [id number] IKE Enciso ID: [id number]       Blood Culture - Blood, Arm, Right [[id number]] Collected: 06/03/21 0558    Specimen: Blood from Arm, Right Updated: 06/04/21 0615     Blood Culture No growth at 24 hours    Comprehensive Metabolic Panel [[id number]]  (Abnormal) Collected: 06/04/21 0442    Specimen: Blood Updated: 06/04/21 0543     Glucose 84 mg/dL      BUN 43 mg/dL      Creatinine 2.45 mg/dL      Sodium 136 mmol/L      Potassium 4.9 mmol/L      Comment: Slight hemolysis detected by analyzer. Results may be affected.        Chloride 106 mmol/L      CO2 20.0 mmol/L      Calcium 8.9 mg/dL      Total Protein 6.6 g/dL      Albumin 3.30 g/dL      ALT (SGPT) 9 U/L      AST (SGOT) 14 U/L      Alkaline Phosphatase 100 U/L      Total Bilirubin 0.2 mg/dL      eGFR Non African Amer 20 mL/min/1.73      Globulin 3.3 gm/dL      A/G Ratio 1.0 g/dL      BUN/Creatinine Ratio 17.6     Anion Gap 10.0 mmol/L     Narrative:      GFR Normal >60  Chronic Kidney Disease <60  Kidney Failure <15      CBC Auto Differential [[id number]]  (Abnormal) Collected: 06/04/21 0442    Specimen: Blood Updated: 06/04/21 0520     WBC 8.25 10*3/mm3      RBC 2.76 10*6/mm3      Hemoglobin 7.9 g/dL      Hematocrit 26.5 %      MCV 96.0 fL      MCH 28.6  pg      MCHC 29.8 g/dL      RDW 18.7 %      RDW-SD 62.8 fl      MPV 9.1 fL      Platelets 273 10*3/mm3      Neutrophil % 52.1 %      Lymphocyte % 36.5 %      Monocyte % 8.1 %      Eosinophil % 1.7 %      Basophil % 1.0 %      Immature Grans % 0.6 %      Neutrophils, Absolute 4.30 10*3/mm3      Lymphocytes, Absolute 3.01 10*3/mm3      Monocytes, Absolute 0.67 10*3/mm3      Eosinophils, Absolute 0.14 10*3/mm3      Basophils, Absolute 0.08 10*3/mm3      Immature Grans, Absolute 0.05 10*3/mm3      nRBC 0.5 /100 WBC     POC Glucose Once [[id number]]  (Abnormal) Collected: 06/03/21 2009    Specimen: Blood Updated: 06/04/21 0013     Glucose 261 mg/dL      Comment: RN NotifiedOperator: [id number] IKE Enciso ID: [id number]       Potassium [[id number]]  (Normal) Collected: 06/03/21 1923    Specimen: Blood Updated: 06/03/21 1947     Potassium 5.2 mmol/L     Blood Culture - Blood, Arm, Left [[id number]] Collected: 06/02/21 1646    Specimen: Blood from Arm, Left Updated: 06/03/21 1700     Blood Culture No growth at 24 hours    POC Glucose Once [[id number]]  (Abnormal) Collected: 06/03/21 1614    Specimen: Blood Updated: 06/03/21 1636     Glucose 367 mg/dL      Comment: RN NotifiedOperator: [id number] TIFFMUSA WILHELMAgnieszka ID: [id number]       POC Glucose Once [[id number]]  (Abnormal) Collected: 06/03/21 1315    Specimen: Blood Updated: 06/03/21 1329     Glucose 363 mg/dL      Comment: RN NotifiedOperator: [id number] DARIEN CrawfordMeter ID: [id number]              Imaging Results (Last 24 Hours)     ** No results found for the last 24 hours. **          I reviewed the patient's new clinical results.    Assessment/Plan:     Active Hospital Problems    Diagnosis    • **Acute on chronic respiratory failure with hypoxia and hypercapnia (CMS/HCC)      - Duo nebs and albuterol as needed  · Patient brought her own BiPAP to wear at night  · O2 maintain sats between 88-92%  · One time solumedrol in ER  · Repeat Echo in am, last  echo in April 2021 showed EF 61.3% with moderate concentric hypertrophy     • Acute pulmonary edema (CMS/HCC)      · CXR shows cardiomegaly and pulmonary infiltrates   · O2 nc at 5L with saturations 90% titrate to maintain saturations 88-92%  · Bumex 1mg bid  · Incentive spirometry  · Patient supplied BiPAP at night     • Hyperkalemia      · 6.1 in ER, EKG showed NSR with old RBBB  · Albuterol treatment  · Kayexalate   · Calcium gluconate 1g  · Consider insulin if not improving     • Morbid obesity (CMS/[facility])      · BMI 49.38  · Likely has obesity hypoventilation syndrome with poor respiratory effort  · Consistent carbohydrate/cardiac/renal diet     • Acute kidney injury superimposed on chronic kidney disease (CMS/[facility])      · Baseline creatinine 1.7 to 1.9 and GFR 25-30  · Hold nephrotoxic agents  · Trend creatinine  · IVF when fluid overload improves  · Continue bicarb tablets  · Continue Lisinopril 10mg       • Pneumonia of both lungs due to infectious organism      · Ceftriaxone started 6/2/21  · Atypicals negative   · Duo nebs and albuterol as needed  · Continue Symbicort  · ABG as needed  · CXR showed PNA vs congestion     • Acute renal failure superimposed on chronic kidney disease (CMS/[facility])      · Held Empagliflozin due to GFR < 30  · Bumex 1mg   · Trend creatinine  · Hold nephrotoxic agents  · Renal diet     • Uncontrolled type 2 diabetes mellitus with complication, with long-term current use of insulin (CMS/[facility])      · Home regimen held  · SSI     • Chronic obstructive pulmonary disease (CMS/[facility])      · Symbicort  · Duo nebs and albuterol as needed  · Continue montelukast and cetirizine  · Incentive spirometry     • Mixed hyperlipidemia      · No lipid panel since 6/24/2020  · Repeat lipid panel   · Continue Atorvastatin 40mg     • Coronary artery disease      · Continue ASA 81mg, Atorvastatin 40mg, Clopidogrel 75mg, Imdur 30mg  · Last cath was 4/30/2021 showed multivessel dz with successful PCI of LM and  proximal Cx with dug eluding stent, and PTCA of native ramus and PCI of proximal SVG to ramus with drug eluding stent.      • GERD (gastroesophageal reflux disease)      · Protonix 40mg IV     • Essential hypertension      · /89 in ER, prn hydralazine  · Continue Lisinopril 10mg, Metoprolol 100mg bid     • S/P CABG x 3      · Completed in 2013  · Continue ASA 81mg, Atorvastatin 40mg, Clopidogrel 75mg, Imdur 30mg, Lisinopril 10mg, Metoprolol 100mg bid     • PAD (peripheral artery disease) (CMS/[facility])      · Continue ASA 81mg, Clopidogrel 75mg, Atorvastatin 40mg           DVT prophylaxis: Heparin  Code Status and Medical Interventions:   Ordered at: 06/02/21 1824     Level Of Support Discussed With:    Patient     Code Status:    CPR     Medical Interventions (Level of Support Prior to Arrest):    Full       Plan for disposition:Where: current living arrangements and When:  3-4 days      Time: 15 min           This document has been electronically signed by Nirmal Calvillo MD on June 4, 2021 07:53 CDT

## 2021-06-04 NOTE — PLAN OF CARE
Goal Outcome Evaluation:     Progress: improving  Outcome Summary: vss; no new complaints; no s/s of distress, she does get soa with exertion but recovers; will continue to monitor;

## 2021-06-05 ENCOUNTER — APPOINTMENT (OUTPATIENT)
Dept: CARDIOLOGY | Facility: HOSPITAL | Age: 61
End: 2021-06-05

## 2021-06-05 LAB
ALBUMIN SERPL-MCNC: 3.4 G/DL (ref 3.5–5.2)
ALBUMIN/GLOB SERPL: 0.9 G/DL
ALP SERPL-CCNC: 112 U/L (ref 39–117)
ALT SERPL W P-5'-P-CCNC: 9 U/L (ref 1–33)
ANION GAP SERPL CALCULATED.3IONS-SCNC: 10 MMOL/L (ref 5–15)
AST SERPL-CCNC: 11 U/L (ref 1–32)
BASOPHILS # BLD AUTO: 0.06 10*3/MM3 (ref 0–0.2)
BASOPHILS NFR BLD AUTO: 0.8 % (ref 0–1.5)
BH CV ECHO MEAS - BSA(HAYCOCK): 2.6 M^2
BH CV ECHO MEAS - BSA: 2.3 M^2
BH CV ECHO MEAS - BZI_BMI: 55.8 KILOGRAMS/M^2
BH CV ECHO MEAS - BZI_METRIC_HEIGHT: 157.5 CM
BH CV ECHO MEAS - BZI_METRIC_WEIGHT: 138.3 KG
BH CV ECHO MEAS - EDV(CUBED): 101.2 ML
BH CV ECHO MEAS - EDV(MOD-SP2): 99.3 ML
BH CV ECHO MEAS - EDV(MOD-SP4): 113 ML
BH CV ECHO MEAS - EDV(TEICH): 100.3 ML
BH CV ECHO MEAS - EF(CUBED): 73.3 %
BH CV ECHO MEAS - EF(MOD-SP2): 79 %
BH CV ECHO MEAS - EF(MOD-SP4): 86.6 %
BH CV ECHO MEAS - EF(TEICH): 65.1 %
BH CV ECHO MEAS - ESV(CUBED): 27 ML
BH CV ECHO MEAS - ESV(MOD-SP2): 20.9 ML
BH CV ECHO MEAS - ESV(MOD-SP4): 15.1 ML
BH CV ECHO MEAS - ESV(TEICH): 35 ML
BH CV ECHO MEAS - FS: 35.6 %
BH CV ECHO MEAS - IVS/LVPW: 0.91
BH CV ECHO MEAS - IVSD: 1.1 CM
BH CV ECHO MEAS - LA DIMENSION: 4.1 CM
BH CV ECHO MEAS - LV DIASTOLIC VOL/BSA (35-75): 49.4 ML/M^2
BH CV ECHO MEAS - LV MASS(C)D: 187.4 GRAMS
BH CV ECHO MEAS - LV MASS(C)DI: 81.9 GRAMS/M^2
BH CV ECHO MEAS - LV SYSTOLIC VOL/BSA (12-30): 6.6 ML/M^2
BH CV ECHO MEAS - LVIDD: 4.7 CM
BH CV ECHO MEAS - LVIDS: 3 CM
BH CV ECHO MEAS - LVLD AP2: 7.2 CM
BH CV ECHO MEAS - LVLD AP4: 7.5 CM
BH CV ECHO MEAS - LVLS AP2: 6.1 CM
BH CV ECHO MEAS - LVLS AP4: 5.9 CM
BH CV ECHO MEAS - LVOT AREA (M): 3.1 CM^2
BH CV ECHO MEAS - LVOT AREA: 3.1 CM^2
BH CV ECHO MEAS - LVOT DIAM: 2 CM
BH CV ECHO MEAS - LVPWD: 1.2 CM
BH CV ECHO MEAS - PA MAX PG (FULL): 2.5 MMHG
BH CV ECHO MEAS - PA MAX PG: 6 MMHG
BH CV ECHO MEAS - PA V2 MAX: 122 CM/SEC
BH CV ECHO MEAS - RV MAX PG: 3.5 MMHG
BH CV ECHO MEAS - RV MEAN PG: 2 MMHG
BH CV ECHO MEAS - RV V1 MAX: 93.3 CM/SEC
BH CV ECHO MEAS - RV V1 MEAN: 65.4 CM/SEC
BH CV ECHO MEAS - RV V1 VTI: 23.5 CM
BH CV ECHO MEAS - RVDD: 3.5 CM
BH CV ECHO MEAS - SI(CUBED): 32.4 ML/M^2
BH CV ECHO MEAS - SI(MOD-SP2): 34.3 ML/M^2
BH CV ECHO MEAS - SI(MOD-SP4): 42.8 ML/M^2
BH CV ECHO MEAS - SI(TEICH): 28.6 ML/M^2
BH CV ECHO MEAS - SV(CUBED): 74.2 ML
BH CV ECHO MEAS - SV(MOD-SP2): 78.4 ML
BH CV ECHO MEAS - SV(MOD-SP4): 97.9 ML
BH CV ECHO MEAS - SV(TEICH): 65.3 ML
BH CV ECHO MEAS - TR MAX VEL: 236 CM/SEC
BILIRUB SERPL-MCNC: 0.2 MG/DL (ref 0–1.2)
BUN SERPL-MCNC: 46 MG/DL (ref 8–23)
BUN/CREAT SERPL: 18.3 (ref 7–25)
CALCIUM SPEC-SCNC: 8.8 MG/DL (ref 8.6–10.5)
CHLORIDE SERPL-SCNC: 104 MMOL/L (ref 98–107)
CO2 SERPL-SCNC: 24 MMOL/L (ref 22–29)
CREAT SERPL-MCNC: 2.51 MG/DL (ref 0.57–1)
DEPRECATED RDW RBC AUTO: 59.7 FL (ref 37–54)
EOSINOPHIL # BLD AUTO: 0.1 10*3/MM3 (ref 0–0.4)
EOSINOPHIL NFR BLD AUTO: 1.3 % (ref 0.3–6.2)
ERYTHROCYTE [DISTWIDTH] IN BLOOD BY AUTOMATED COUNT: 18.2 % (ref 12.3–15.4)
GFR SERPL CREATININE-BSD FRML MDRD: 19 ML/MIN/1.73
GLOBULIN UR ELPH-MCNC: 3.6 GM/DL
GLUCOSE BLDC GLUCOMTR-MCNC: 312 MG/DL (ref 70–130)
GLUCOSE BLDC GLUCOMTR-MCNC: 354 MG/DL (ref 70–130)
GLUCOSE BLDC GLUCOMTR-MCNC: 375 MG/DL (ref 70–130)
GLUCOSE BLDC GLUCOMTR-MCNC: 375 MG/DL (ref 70–130)
GLUCOSE SERPL-MCNC: 222 MG/DL (ref 65–99)
HCT VFR BLD AUTO: 28.9 % (ref 34–46.6)
HGB BLD-MCNC: 8.8 G/DL (ref 12–15.9)
IMM GRANULOCYTES # BLD AUTO: 0.1 10*3/MM3 (ref 0–0.05)
IMM GRANULOCYTES NFR BLD AUTO: 1.3 % (ref 0–0.5)
LYMPHOCYTES # BLD AUTO: 1.61 10*3/MM3 (ref 0.7–3.1)
LYMPHOCYTES NFR BLD AUTO: 21 % (ref 19.6–45.3)
MCH RBC QN AUTO: 28.6 PG (ref 26.6–33)
MCHC RBC AUTO-ENTMCNC: 30.4 G/DL (ref 31.5–35.7)
MCV RBC AUTO: 93.8 FL (ref 79–97)
MONOCYTES # BLD AUTO: 0.39 10*3/MM3 (ref 0.1–0.9)
MONOCYTES NFR BLD AUTO: 5.1 % (ref 5–12)
NEUTROPHILS NFR BLD AUTO: 5.4 10*3/MM3 (ref 1.7–7)
NEUTROPHILS NFR BLD AUTO: 70.5 % (ref 42.7–76)
NRBC BLD AUTO-RTO: 0.3 /100 WBC (ref 0–0.2)
PLATELET # BLD AUTO: 282 10*3/MM3 (ref 140–450)
PMV BLD AUTO: 9.5 FL (ref 6–12)
POTASSIUM SERPL-SCNC: 4.8 MMOL/L (ref 3.5–5.2)
POTASSIUM SERPL-SCNC: 5.3 MMOL/L (ref 3.5–5.2)
PROT SERPL-MCNC: 7 G/DL (ref 6–8.5)
RBC # BLD AUTO: 3.08 10*6/MM3 (ref 3.77–5.28)
SODIUM SERPL-SCNC: 138 MMOL/L (ref 136–145)
WBC # BLD AUTO: 7.66 10*3/MM3 (ref 3.4–10.8)

## 2021-06-05 PROCEDURE — 85025 COMPLETE CBC W/AUTO DIFF WBC: CPT | Performed by: STUDENT IN AN ORGANIZED HEALTH CARE EDUCATION/TRAINING PROGRAM

## 2021-06-05 PROCEDURE — 93308 TTE F-UP OR LMTD: CPT

## 2021-06-05 PROCEDURE — 93321 DOPPLER ECHO F-UP/LMTD STD: CPT

## 2021-06-05 PROCEDURE — 99232 SBSQ HOSP IP/OBS MODERATE 35: CPT | Performed by: STUDENT IN AN ORGANIZED HEALTH CARE EDUCATION/TRAINING PROGRAM

## 2021-06-05 PROCEDURE — 93308 TTE F-UP OR LMTD: CPT | Performed by: INTERNAL MEDICINE

## 2021-06-05 PROCEDURE — 63710000001 INSULIN DETEMIR PER 5 UNITS: Performed by: STUDENT IN AN ORGANIZED HEALTH CARE EDUCATION/TRAINING PROGRAM

## 2021-06-05 PROCEDURE — 25010000002 PERFLUTREN (DEFINITY) 8.476 MG IN SODIUM CHLORIDE (PF) 0.9 % 10 ML INJECTION: Performed by: STUDENT IN AN ORGANIZED HEALTH CARE EDUCATION/TRAINING PROGRAM

## 2021-06-05 PROCEDURE — 25010000002 HEPARIN (PORCINE) PER 1000 UNITS: Performed by: STUDENT IN AN ORGANIZED HEALTH CARE EDUCATION/TRAINING PROGRAM

## 2021-06-05 PROCEDURE — 84132 ASSAY OF SERUM POTASSIUM: CPT | Performed by: STUDENT IN AN ORGANIZED HEALTH CARE EDUCATION/TRAINING PROGRAM

## 2021-06-05 PROCEDURE — 80053 COMPREHEN METABOLIC PANEL: CPT | Performed by: STUDENT IN AN ORGANIZED HEALTH CARE EDUCATION/TRAINING PROGRAM

## 2021-06-05 PROCEDURE — 25010000002 NA FERRIC GLUC CPLX PER 12.5 MG: Performed by: STUDENT IN AN ORGANIZED HEALTH CARE EDUCATION/TRAINING PROGRAM

## 2021-06-05 PROCEDURE — 93325 DOPPLER ECHO COLOR FLOW MAPG: CPT

## 2021-06-05 PROCEDURE — 36415 COLL VENOUS BLD VENIPUNCTURE: CPT | Performed by: STUDENT IN AN ORGANIZED HEALTH CARE EDUCATION/TRAINING PROGRAM

## 2021-06-05 PROCEDURE — 93321 DOPPLER ECHO F-UP/LMTD STD: CPT | Performed by: INTERNAL MEDICINE

## 2021-06-05 PROCEDURE — 82962 GLUCOSE BLOOD TEST: CPT

## 2021-06-05 PROCEDURE — 94799 UNLISTED PULMONARY SVC/PX: CPT

## 2021-06-05 PROCEDURE — 93005 ELECTROCARDIOGRAM TRACING: CPT | Performed by: STUDENT IN AN ORGANIZED HEALTH CARE EDUCATION/TRAINING PROGRAM

## 2021-06-05 PROCEDURE — 93010 ELECTROCARDIOGRAM REPORT: CPT | Performed by: INTERNAL MEDICINE

## 2021-06-05 PROCEDURE — 63710000001 INSULIN ASPART PER 5 UNITS: Performed by: STUDENT IN AN ORGANIZED HEALTH CARE EDUCATION/TRAINING PROGRAM

## 2021-06-05 PROCEDURE — 93325 DOPPLER ECHO COLOR FLOW MAPG: CPT | Performed by: INTERNAL MEDICINE

## 2021-06-05 RX ORDER — FUROSEMIDE 20 MG/1
20 TABLET ORAL DAILY
Status: DISCONTINUED | OUTPATIENT
Start: 2021-06-06 | End: 2021-06-09 | Stop reason: HOSPADM

## 2021-06-05 RX ORDER — SODIUM POLYSTYRENE SULFONATE 15 G/60ML
30 SUSPENSION ORAL; RECTAL ONCE
Status: DISCONTINUED | OUTPATIENT
Start: 2021-06-05 | End: 2021-06-05

## 2021-06-05 RX ADMIN — GABAPENTIN 800 MG: 400 CAPSULE ORAL at 21:17

## 2021-06-05 RX ADMIN — SODIUM CHLORIDE 250 MG: 9 INJECTION, SOLUTION INTRAVENOUS at 08:21

## 2021-06-05 RX ADMIN — HEPARIN SODIUM 5000 UNITS: 5000 INJECTION INTRAVENOUS; SUBCUTANEOUS at 21:17

## 2021-06-05 RX ADMIN — HEPARIN SODIUM 5000 UNITS: 5000 INJECTION INTRAVENOUS; SUBCUTANEOUS at 13:56

## 2021-06-05 RX ADMIN — ISOSORBIDE MONONITRATE 30 MG: 30 TABLET, EXTENDED RELEASE ORAL at 08:17

## 2021-06-05 RX ADMIN — DULOXETINE HYDROCHLORIDE 20 MG: 20 CAPSULE, DELAYED RELEASE ORAL at 08:17

## 2021-06-05 RX ADMIN — IPRATROPIUM BROMIDE AND ALBUTEROL SULFATE 3 ML: 2.5; .5 SOLUTION RESPIRATORY (INHALATION) at 15:35

## 2021-06-05 RX ADMIN — LISINOPRIL 10 MG: 5 TABLET ORAL at 08:17

## 2021-06-05 RX ADMIN — INSULIN DETEMIR 30 UNITS: 100 INJECTION, SOLUTION SUBCUTANEOUS at 08:16

## 2021-06-05 RX ADMIN — SODIUM BICARBONATE 650 MG: 650 TABLET ORAL at 08:17

## 2021-06-05 RX ADMIN — PERFLUTREN 1.5 ML: 6.52 INJECTION, SUSPENSION INTRAVENOUS at 10:48

## 2021-06-05 RX ADMIN — INSULIN DETEMIR 30 UNITS: 100 INJECTION, SOLUTION SUBCUTANEOUS at 21:22

## 2021-06-05 RX ADMIN — INSULIN ASPART 10 UNITS: 100 INJECTION, SOLUTION INTRAVENOUS; SUBCUTANEOUS at 11:03

## 2021-06-05 RX ADMIN — PANTOPRAZOLE SODIUM 40 MG: 40 TABLET, DELAYED RELEASE ORAL at 21:17

## 2021-06-05 RX ADMIN — FERROUS SULFATE TAB EC 324 MG (65 MG FE EQUIVALENT) 324 MG: 324 (65 FE) TABLET DELAYED RESPONSE at 08:21

## 2021-06-05 RX ADMIN — BUDESONIDE AND FORMOTEROL FUMARATE DIHYDRATE 2 PUFF: 160; 4.5 AEROSOL RESPIRATORY (INHALATION) at 19:34

## 2021-06-05 RX ADMIN — METOPROLOL TARTRATE 100 MG: 50 TABLET, FILM COATED ORAL at 08:17

## 2021-06-05 RX ADMIN — OXYBUTYNIN CHLORIDE 5 MG: 5 TABLET, EXTENDED RELEASE ORAL at 08:17

## 2021-06-05 RX ADMIN — IPRATROPIUM BROMIDE AND ALBUTEROL SULFATE 3 ML: 2.5; .5 SOLUTION RESPIRATORY (INHALATION) at 19:34

## 2021-06-05 RX ADMIN — ASPIRIN 81 MG: 81 TABLET, FILM COATED ORAL at 08:17

## 2021-06-05 RX ADMIN — INSULIN ASPART 12 UNITS: 100 INJECTION, SOLUTION INTRAVENOUS; SUBCUTANEOUS at 17:03

## 2021-06-05 RX ADMIN — INSULIN ASPART 5 UNITS: 100 INJECTION, SOLUTION INTRAVENOUS; SUBCUTANEOUS at 23:52

## 2021-06-05 RX ADMIN — INSULIN ASPART 5 UNITS: 100 INJECTION, SOLUTION INTRAVENOUS; SUBCUTANEOUS at 07:37

## 2021-06-05 RX ADMIN — SODIUM CHLORIDE, PRESERVATIVE FREE 10 ML: 5 INJECTION INTRAVENOUS at 21:24

## 2021-06-05 RX ADMIN — SODIUM CHLORIDE, PRESERVATIVE FREE 10 ML: 5 INJECTION INTRAVENOUS at 08:16

## 2021-06-05 RX ADMIN — NYSTATIN: 100000 POWDER TOPICAL at 16:03

## 2021-06-05 RX ADMIN — SODIUM BICARBONATE 650 MG: 650 TABLET ORAL at 21:17

## 2021-06-05 RX ADMIN — CLOPIDOGREL BISULFATE 75 MG: 75 TABLET ORAL at 08:17

## 2021-06-05 RX ADMIN — MONTELUKAST SODIUM 10 MG: 10 TABLET, COATED ORAL at 21:17

## 2021-06-05 RX ADMIN — HEPARIN SODIUM 5000 UNITS: 5000 INJECTION INTRAVENOUS; SUBCUTANEOUS at 05:09

## 2021-06-05 RX ADMIN — METOPROLOL TARTRATE 100 MG: 50 TABLET, FILM COATED ORAL at 21:25

## 2021-06-05 RX ADMIN — NYSTATIN: 100000 POWDER TOPICAL at 08:18

## 2021-06-05 RX ADMIN — ATORVASTATIN CALCIUM 40 MG: 40 TABLET, FILM COATED ORAL at 21:17

## 2021-06-05 RX ADMIN — CETIRIZINE HYDROCHLORIDE 10 MG: 10 TABLET, FILM COATED ORAL at 08:17

## 2021-06-05 RX ADMIN — BUMETANIDE 1 MG: 0.25 INJECTION INTRAMUSCULAR; INTRAVENOUS at 08:16

## 2021-06-05 NOTE — PLAN OF CARE
Goal Outcome Evaluation:  Plan of Care Reviewed With: patient  Progress: improving  Outcome Summary: Pt up in chair throughout the day. No c/o pain or s/s of distress at this time. Still having a lot of output noted. Will continue to monitor this pt.

## 2021-06-05 NOTE — PROGRESS NOTES
FAMILY MEDICINE DAILY PROGRESS NOTE  NAME: Yamileth Slater  : 1960  MRN: [id number]     LOS: 3 days     PROVIDER OF SERVICE: Nirmal Calvillo MD    Chief Complaint: Acute on chronic respiratory failure with hypoxia and hypercapnia (CMS/HCC)    Subjective:     Interval History:  History taken from: patient chart  VSS with no acute events overnight, continuing to use trilogy. Weight decreased 4lbs from yesterday. Potassium elevated to 5.3 and creatinine increased from 2.45 to 2.51. Hgb increased.    Review of Systems:   Review of Systems   Constitutional: Negative for activity change and appetite change.   HENT: Negative for congestion and trouble swallowing.    Respiratory: Positive for shortness of breath (Improving). Negative for chest tightness.    Cardiovascular: Negative for chest pain and palpitations.   Gastrointestinal: Negative for abdominal distention and abdominal pain.   Genitourinary: Negative for difficulty urinating and dysuria.   Musculoskeletal: Positive for arthralgias (Left thumb). Negative for myalgias.   Skin: Negative for color change and pallor.   Neurological: Negative for dizziness, light-headedness and headaches.   Psychiatric/Behavioral: Negative for agitation and behavioral problems.       Objective:     Vital Signs  Temp:  [97.1 °F (36.2 °C)-98.1 °F (36.7 °C)] 97.1 °F (36.2 °C)  Heart Rate:  [58-68] 60  Resp:  [20] 20  BP: (132-181)/(61-78) 156/75    Physical Exam  Physical Exam  Constitutional:       General: She is not in acute distress.     Appearance: She is well-developed.   HENT:      Head: Normocephalic and atraumatic.      Right Ear: External ear normal.      Left Ear: External ear normal.      Nose: Nose normal.   Eyes:      Extraocular Movements: Extraocular movements intact.      Pupils: Pupils are equal, round, and reactive to light.   Cardiovascular:      Rate and Rhythm: Normal rate and regular rhythm.      Heart sounds: Normal heart sounds. No murmur heard.    No friction rub. No gallop.    Pulmonary:      Effort: Pulmonary effort is normal. No respiratory distress.      Breath sounds: Examination of the right-lower field reveals rales. Examination of the left-lower field reveals rales. Rales present. No wheezing.   Abdominal:      General: Bowel sounds are normal. There is no distension.      Palpations: Abdomen is soft.      Tenderness: There is no abdominal tenderness.   Musculoskeletal:         General: Normal range of motion.      Cervical back: Normal range of motion and neck supple.      Right lower leg: Edema (Mild) present.      Left lower leg: Edema (Mild) present.   Skin:     General: Skin is warm.      Findings: No erythema.   Neurological:      Mental Status: She is alert and oriented to person, place, and time. Mental status is at baseline.   Psychiatric:         Mood and Affect: Mood normal.         Behavior: Behavior normal.         Medication Review    Current Facility-Administered Medications:   •  aspirin EC tablet 81 mg, 81 mg, Oral, Daily, Haily Mcneil MD, 81 mg at 06/05/21 0817  •  atorvastatin (LIPITOR) tablet 40 mg, 40 mg, Oral, Nightly, Haily Mcneil MD, 40 mg at 06/04/21 2042  •  budesonide-formoterol (SYMBICORT) 160-4.5 MCG/ACT inhaler 2 puff, 2 puff, Inhalation, BID - RT, Haily Mcneil MD, 2 puff at 06/04/21 1932  •  cetirizine (zyrTEC) tablet 10 mg, 10 mg, Oral, Daily, Haily Mcneil MD, 10 mg at 06/05/21 0817  •  clopidogrel (PLAVIX) tablet 75 mg, 75 mg, Oral, Daily, Haily Mcneil MD, 75 mg at 06/05/21 0817  •  cyclobenzaprine (FLEXERIL) tablet 10 mg, 10 mg, Oral, BID PRN, Haily Mcneil MD, 10 mg at 06/04/21 2129  •  dextrose (D50W) 25 g/ 50mL Intravenous Solution 25 g, 25 g, Intravenous, Q15 Min PRN, Haily Mcneil MD  •  dextrose (GLUTOSE) oral gel 15 g, 15 g, Oral, Q15 Min PRN, Haily Mcneil MD  •  DULoxetine (CYMBALTA) DR capsule 20 mg, 20 mg, Oral, Daily, Haily Mcneil MD, 20 mg at 06/05/21 0817  •  ferric gluconate (FERRLECIT)  250 mg in sodium chloride 0.9 % 120 mL IVPB, 250 mg, Intravenous, Daily, Nirmal Calvillo MD, Last Rate: 60 mL/hr at 06/05/21 0821, 250 mg at 06/05/21 0821  •  ferrous sulfate EC tablet 324 mg, 324 mg, Oral, Daily With Breakfast, Haily Mcneil MD, 324 mg at 06/05/21 0821  •  gabapentin (NEURONTIN) capsule 800 mg, 800 mg, Oral, Nightly, Haily Mcneil MD, 800 mg at 06/04/21 2041  •  glucagon (human recombinant) (GLUCAGEN DIAGNOSTIC) injection 1 mg, 1 mg, Subcutaneous, Q15 Min PRN, Haily Mcneil MD  •  heparin (porcine) 5000 UNIT/ML injection 5,000 Units, 5,000 Units, Subcutaneous, Q8H, Haily Mcneil MD, 5,000 Units at 06/05/21 0509  •  hydrALAZINE (APRESOLINE) injection 10 mg, 10 mg, Intravenous, Q6H PRN, Nirmal Calvillo MD  •  insulin aspart (novoLOG) injection 0-14 Units, 0-14 Units, Subcutaneous, TID AC, Haily Mcneil MD, 5 Units at 06/05/21 0737  •  insulin detemir (LEVEMIR) injection 30 Units, 30 Units, Subcutaneous, Q12H, Nirmal Calvillo MD, 30 Units at 06/05/21 0816  •  ipratropium-albuterol (DUO-NEB) nebulizer solution 3 mL, 3 mL, Nebulization, 4x Daily - RT, Haily Mcneil MD, 3 mL at 06/04/21 1932  •  isosorbide mononitrate (IMDUR) 24 hr tablet 30 mg, 30 mg, Oral, Daily, Haily Mcneil MD, 30 mg at 06/05/21 0817  •  lisinopril (PRINIVIL,ZESTRIL) tablet 10 mg, 10 mg, Oral, Daily, Haily Mcneil MD, 10 mg at 06/05/21 0817  •  metoprolol tartrate (LOPRESSOR) tablet 100 mg, 100 mg, Oral, Q12H, Haily Mcneil MD, 100 mg at 06/05/21 0817  •  montelukast (SINGULAIR) tablet 10 mg, 10 mg, Oral, Nightly, Haily Mcneil MD, 10 mg at 06/04/21 2042  •  nystatin (MYCOSTATIN) powder, , Topical, TID, Haily Mcneil MD, Given at 06/05/21 0818  •  ondansetron (ZOFRAN) injection 4 mg, 4 mg, Intravenous, Q6H PRN, Haily Mcneil MD  •  oxybutynin XL (DITROPAN-XL) 24 hr tablet 5 mg, 5 mg, Oral, Daily, Haily Mcneil MD, 5 mg at 06/05/21 0817  •  pantoprazole (PROTONIX) EC tablet 40 mg, 40 mg, Oral, Nightly,  Haily Mcneil MD, 40 mg at 06/04/21 2042  •  sodium bicarbonate tablet 650 mg, 650 mg, Oral, BID, Haily Mcneil MD, 650 mg at 06/05/21 0817  •  [COMPLETED] Insert peripheral IV, , , Once **AND** sodium chloride 0.9 % flush 10 mL, 10 mL, Intravenous, PRN, Haily Mcneil MD  •  sodium chloride 0.9 % flush 10 mL, 10 mL, Intravenous, Q12H, Haily Mcneil MD, 10 mL at 06/05/21 0816  •  sodium chloride 0.9 % flush 10 mL, 10 mL, Intravenous, PRN, Haily Mcneil MD  •  sucralfate (CARAFATE) tablet 1 g, 1 g, Oral, 4x Daily, Haily Mcneil MD, Stopped at 06/03/21 1800     Diagnostic Data    Lab Results (last 24 hours)     Procedure Component Value Units Date/Time    Comprehensive Metabolic Panel [[id number]]  (Abnormal) Collected: 06/05/21 0525    Specimen: Blood Updated: 06/05/21 0654     Glucose 222 mg/dL      BUN 46 mg/dL      Creatinine 2.51 mg/dL      Sodium 138 mmol/L      Potassium 5.3 mmol/L      Chloride 104 mmol/L      CO2 24.0 mmol/L      Calcium 8.8 mg/dL      Total Protein 7.0 g/dL      Albumin 3.40 g/dL      ALT (SGPT) 9 U/L      AST (SGOT) 11 U/L      Alkaline Phosphatase 112 U/L      Total Bilirubin 0.2 mg/dL      eGFR Non African Amer 19 mL/min/1.73      Globulin 3.6 gm/dL      A/G Ratio 0.9 g/dL      BUN/Creatinine Ratio 18.3     Anion Gap 10.0 mmol/L     Narrative:      GFR Normal >60  Chronic Kidney Disease <60  Kidney Failure <15      CBC Auto Differential [[id number]]  (Abnormal) Collected: 06/05/21 0525    Specimen: Blood Updated: 06/05/21 0635     WBC 7.66 10*3/mm3      RBC 3.08 10*6/mm3      Hemoglobin 8.8 g/dL      Hematocrit 28.9 %      MCV 93.8 fL      MCH 28.6 pg      MCHC 30.4 g/dL      RDW 18.2 %      RDW-SD 59.7 fl      MPV 9.5 fL      Platelets 282 10*3/mm3      Neutrophil % 70.5 %      Lymphocyte % 21.0 %      Monocyte % 5.1 %      Eosinophil % 1.3 %      Basophil % 0.8 %      Immature Grans % 1.3 %      Neutrophils, Absolute 5.40 10*3/mm3      Lymphocytes, Absolute 1.61 10*3/mm3       Monocytes, Absolute 0.39 10*3/mm3      Eosinophils, Absolute 0.10 10*3/mm3      Basophils, Absolute 0.06 10*3/mm3      Immature Grans, Absolute 0.10 10*3/mm3      nRBC 0.3 /100 WBC     Blood Culture - Blood, Arm, Right [[id number]] Collected: 06/03/21 0558    Specimen: Blood from Arm, Right Updated: 06/05/21 0615     Blood Culture No growth at 2 days    POC Glucose Once [[id number]]  (Abnormal) Collected: 06/04/21 1900    Specimen: Blood Updated: 06/04/21 2052     Glucose 237 mg/dL      Comment: RN NotifiedOperator: [id number] IKE SANDRAMeter ID: [id number]       Blood Culture - Blood, Arm, Left [[id number]] Collected: 06/02/21 1646    Specimen: Blood from Arm, Left Updated: 06/04/21 1700     Blood Culture No growth at 2 days    POC Glucose Once [[id number]]  (Abnormal) Collected: 06/04/21 1502    Specimen: Blood Updated: 06/04/21 1520     Glucose 150 mg/dL      Comment: RN NotifiedOperator: [id number] MERLE MAKAYLAMeter ID: [id number]              Imaging Results (Last 24 Hours)     ** No results found for the last 24 hours. **          I reviewed the patient's new clinical results.    Assessment/Plan:     Active Hospital Problems    Diagnosis    • **Acute on chronic respiratory failure with hypoxia and hypercapnia (CMS/HCC)      - Duo nebs and albuterol as needed  · Patient brought her own BiPAP to wear at night  · O2 maintain sats between 88-92%  · One time solumedrol in ER  · last echo in April 2021 showed EF 61.3% with moderate concentric hypertrophy     • Acute pulmonary edema (CMS/HCC)      · CXR shows cardiomegaly and pulmonary infiltrates   · O2 nc at 5L with saturations 90% titrate to maintain saturations 88-92%  · Bumex 1mg bid  · Incentive spirometry  · Patient supplied BiPAP at night     • Hyperkalemia      · 6.1 in ER, EKG showed NSR with old RBBB  · Albuterol treatment  · Kayexalate   · Calcium gluconate 1g  · Consider insulin if not improving     • Morbid obesity (CMS/HCC)      · BMI  49.38  · Likely has obesity hypoventilation syndrome with poor respiratory effort  · Consistent carbohydrate/cardiac/renal diet     • Acute kidney injury superimposed on chronic kidney disease (CMS/[facility])      · Baseline creatinine 1.7 to 1.9 and GFR 25-30  · Hold nephrotoxic agents  · Trend creatinine  · IVF when fluid overload improves  · Continue bicarb tablets  · Continue Lisinopril 10mg       • Pneumonia of both lungs due to infectious organism      · Ceftriaxone started 6/2/21  · Atypicals negative   · Duo nebs and albuterol as needed  · Continue Symbicort  · ABG as needed  · CXR showed PNA vs congestion     • Acute renal failure superimposed on chronic kidney disease (CMS/[facility])      · Held Empagliflozin due to GFR < 30  · Bumex 1mg   · Trend creatinine  · Hold nephrotoxic agents  · Renal diet     • Uncontrolled type 2 diabetes mellitus with complication, with long-term current use of insulin (CMS/[facility])      · Home regimen held  · SSI     • Chronic obstructive pulmonary disease (CMS/[facility])      · Symbicort  · Duo nebs and albuterol as needed  · Continue montelukast and cetirizine  · Incentive spirometry     • Mixed hyperlipidemia      · No lipid panel since 6/24/2020  · Repeat lipid panel   · Continue Atorvastatin 40mg     • Coronary artery disease      · Continue ASA 81mg, Atorvastatin 40mg, Clopidogrel 75mg, Imdur 30mg  · Last cath was 4/30/2021 showed multivessel dz with successful PCI of LM and proximal Cx with dug eluding stent, and PTCA of native ramus and PCI of proximal SVG to ramus with drug eluding stent.      • GERD (gastroesophageal reflux disease)      · Protonix 40mg IV     • Essential hypertension      · /89 in ER, prn hydralazine  · Continue Lisinopril 10mg, Metoprolol 100mg bid     • S/P CABG x 3      · Completed in 2013  · Continue ASA 81mg, Atorvastatin 40mg, Clopidogrel 75mg, Imdur 30mg, Lisinopril 10mg, Metoprolol 100mg bid     • PAD (peripheral artery disease) (CMS/[facility])      · Continue  ASA 81mg, Clopidogrel 75mg, Atorvastatin 40mg           DVT prophylaxis: Heparin  Code Status and Medical Interventions:   Ordered at: 06/02/21 1824     Level Of Support Discussed With:    Patient     Code Status:    CPR     Medical Interventions (Level of Support Prior to Arrest):    Full       Plan for disposition:Where: current living arrangements and When:  2-3 days      Time: 15 min           This document has been electronically signed by Nirmal Calvillo MD on June 5, 2021 09:26 CDT

## 2021-06-05 NOTE — PLAN OF CARE
Goal Outcome Evaluation:     Progress: improving  Outcome Summary: pt has rested well through night and has had alot of urine output; no c/o pain or s/s of distress; will continue to monitor

## 2021-06-06 LAB
ALBUMIN SERPL-MCNC: 3.5 G/DL (ref 3.5–5.2)
ALBUMIN/GLOB SERPL: 0.9 G/DL
ALP SERPL-CCNC: 105 U/L (ref 39–117)
ALT SERPL W P-5'-P-CCNC: 8 U/L (ref 1–33)
ANION GAP SERPL CALCULATED.3IONS-SCNC: 10 MMOL/L (ref 5–15)
AST SERPL-CCNC: 8 U/L (ref 1–32)
BASOPHILS # BLD AUTO: 0.02 10*3/MM3 (ref 0–0.2)
BASOPHILS NFR BLD AUTO: 0.3 % (ref 0–1.5)
BILIRUB SERPL-MCNC: 0.2 MG/DL (ref 0–1.2)
BUN SERPL-MCNC: 47 MG/DL (ref 8–23)
BUN/CREAT SERPL: 20.4 (ref 7–25)
CALCIUM SPEC-SCNC: 8.7 MG/DL (ref 8.6–10.5)
CHLORIDE SERPL-SCNC: 102 MMOL/L (ref 98–107)
CO2 SERPL-SCNC: 25 MMOL/L (ref 22–29)
CREAT SERPL-MCNC: 2.3 MG/DL (ref 0.57–1)
DEPRECATED RDW RBC AUTO: 59.1 FL (ref 37–54)
EOSINOPHIL # BLD AUTO: 0.01 10*3/MM3 (ref 0–0.4)
EOSINOPHIL NFR BLD AUTO: 0.1 % (ref 0.3–6.2)
ERYTHROCYTE [DISTWIDTH] IN BLOOD BY AUTOMATED COUNT: 18.3 % (ref 12.3–15.4)
GFR SERPL CREATININE-BSD FRML MDRD: 21 ML/MIN/1.73
GLOBULIN UR ELPH-MCNC: 4.1 GM/DL
GLUCOSE BLDC GLUCOMTR-MCNC: 236 MG/DL (ref 70–130)
GLUCOSE BLDC GLUCOMTR-MCNC: 265 MG/DL (ref 70–130)
GLUCOSE BLDC GLUCOMTR-MCNC: 308 MG/DL (ref 70–130)
GLUCOSE BLDC GLUCOMTR-MCNC: 337 MG/DL (ref 70–130)
GLUCOSE BLDC GLUCOMTR-MCNC: 397 MG/DL (ref 70–130)
GLUCOSE SERPL-MCNC: 263 MG/DL (ref 65–99)
HCT VFR BLD AUTO: 27.5 % (ref 34–46.6)
HGB BLD-MCNC: 8.8 G/DL (ref 12–15.9)
IMM GRANULOCYTES # BLD AUTO: 0.15 10*3/MM3 (ref 0–0.05)
IMM GRANULOCYTES NFR BLD AUTO: 1.9 % (ref 0–0.5)
LYMPHOCYTES # BLD AUTO: 1.74 10*3/MM3 (ref 0.7–3.1)
LYMPHOCYTES NFR BLD AUTO: 22.5 % (ref 19.6–45.3)
MCH RBC QN AUTO: 29.4 PG (ref 26.6–33)
MCHC RBC AUTO-ENTMCNC: 32 G/DL (ref 31.5–35.7)
MCV RBC AUTO: 92 FL (ref 79–97)
MONOCYTES # BLD AUTO: 0.46 10*3/MM3 (ref 0.1–0.9)
MONOCYTES NFR BLD AUTO: 6 % (ref 5–12)
NEUTROPHILS NFR BLD AUTO: 5.35 10*3/MM3 (ref 1.7–7)
NEUTROPHILS NFR BLD AUTO: 69.2 % (ref 42.7–76)
NRBC BLD AUTO-RTO: 0.5 /100 WBC (ref 0–0.2)
PLATELET # BLD AUTO: 235 10*3/MM3 (ref 140–450)
PMV BLD AUTO: 9.3 FL (ref 6–12)
POTASSIUM SERPL-SCNC: 4.6 MMOL/L (ref 3.5–5.2)
PROT SERPL-MCNC: 7.6 G/DL (ref 6–8.5)
QT INTERVAL: 490 MS
QTC INTERVAL: 451 MS
RBC # BLD AUTO: 2.99 10*6/MM3 (ref 3.77–5.28)
SODIUM SERPL-SCNC: 137 MMOL/L (ref 136–145)
WBC # BLD AUTO: 7.73 10*3/MM3 (ref 3.4–10.8)

## 2021-06-06 PROCEDURE — 94799 UNLISTED PULMONARY SVC/PX: CPT

## 2021-06-06 PROCEDURE — 82962 GLUCOSE BLOOD TEST: CPT

## 2021-06-06 PROCEDURE — 85025 COMPLETE CBC W/AUTO DIFF WBC: CPT | Performed by: STUDENT IN AN ORGANIZED HEALTH CARE EDUCATION/TRAINING PROGRAM

## 2021-06-06 PROCEDURE — 63710000001 INSULIN ASPART PER 5 UNITS: Performed by: STUDENT IN AN ORGANIZED HEALTH CARE EDUCATION/TRAINING PROGRAM

## 2021-06-06 PROCEDURE — 94760 N-INVAS EAR/PLS OXIMETRY 1: CPT

## 2021-06-06 PROCEDURE — 63710000001 INSULIN DETEMIR PER 5 UNITS: Performed by: STUDENT IN AN ORGANIZED HEALTH CARE EDUCATION/TRAINING PROGRAM

## 2021-06-06 PROCEDURE — 80053 COMPREHEN METABOLIC PANEL: CPT | Performed by: STUDENT IN AN ORGANIZED HEALTH CARE EDUCATION/TRAINING PROGRAM

## 2021-06-06 PROCEDURE — 36415 COLL VENOUS BLD VENIPUNCTURE: CPT | Performed by: STUDENT IN AN ORGANIZED HEALTH CARE EDUCATION/TRAINING PROGRAM

## 2021-06-06 PROCEDURE — 25010000002 HEPARIN (PORCINE) PER 1000 UNITS: Performed by: STUDENT IN AN ORGANIZED HEALTH CARE EDUCATION/TRAINING PROGRAM

## 2021-06-06 RX ORDER — GABAPENTIN 400 MG/1
800 CAPSULE ORAL 3 TIMES DAILY
Status: DISCONTINUED | OUTPATIENT
Start: 2021-06-06 | End: 2021-06-09 | Stop reason: HOSPADM

## 2021-06-06 RX ADMIN — INSULIN ASPART 10 UNITS: 100 INJECTION, SOLUTION INTRAVENOUS; SUBCUTANEOUS at 17:05

## 2021-06-06 RX ADMIN — GABAPENTIN 800 MG: 400 CAPSULE ORAL at 20:25

## 2021-06-06 RX ADMIN — OXYBUTYNIN CHLORIDE 5 MG: 5 TABLET, EXTENDED RELEASE ORAL at 08:36

## 2021-06-06 RX ADMIN — HEPARIN SODIUM 5000 UNITS: 5000 INJECTION INTRAVENOUS; SUBCUTANEOUS at 13:29

## 2021-06-06 RX ADMIN — IPRATROPIUM BROMIDE AND ALBUTEROL SULFATE 3 ML: 2.5; .5 SOLUTION RESPIRATORY (INHALATION) at 07:14

## 2021-06-06 RX ADMIN — FUROSEMIDE 20 MG: 20 TABLET ORAL at 08:36

## 2021-06-06 RX ADMIN — BUDESONIDE AND FORMOTEROL FUMARATE DIHYDRATE 2 PUFF: 160; 4.5 AEROSOL RESPIRATORY (INHALATION) at 19:10

## 2021-06-06 RX ADMIN — SODIUM CHLORIDE, PRESERVATIVE FREE 10 ML: 5 INJECTION INTRAVENOUS at 08:36

## 2021-06-06 RX ADMIN — ASPIRIN 81 MG: 81 TABLET, FILM COATED ORAL at 08:36

## 2021-06-06 RX ADMIN — FERROUS SULFATE TAB EC 324 MG (65 MG FE EQUIVALENT) 324 MG: 324 (65 FE) TABLET DELAYED RESPONSE at 08:36

## 2021-06-06 RX ADMIN — ATORVASTATIN CALCIUM 40 MG: 40 TABLET, FILM COATED ORAL at 20:25

## 2021-06-06 RX ADMIN — METOPROLOL TARTRATE 100 MG: 50 TABLET, FILM COATED ORAL at 08:36

## 2021-06-06 RX ADMIN — IPRATROPIUM BROMIDE AND ALBUTEROL SULFATE 3 ML: 2.5; .5 SOLUTION RESPIRATORY (INHALATION) at 11:04

## 2021-06-06 RX ADMIN — BUDESONIDE AND FORMOTEROL FUMARATE DIHYDRATE 2 PUFF: 160; 4.5 AEROSOL RESPIRATORY (INHALATION) at 07:21

## 2021-06-06 RX ADMIN — PANTOPRAZOLE SODIUM 40 MG: 40 TABLET, DELAYED RELEASE ORAL at 20:25

## 2021-06-06 RX ADMIN — INSULIN ASPART 5 UNITS: 100 INJECTION, SOLUTION INTRAVENOUS; SUBCUTANEOUS at 10:53

## 2021-06-06 RX ADMIN — CYCLOBENZAPRINE 10 MG: 10 TABLET, FILM COATED ORAL at 00:53

## 2021-06-06 RX ADMIN — IPRATROPIUM BROMIDE AND ALBUTEROL SULFATE 3 ML: 2.5; .5 SOLUTION RESPIRATORY (INHALATION) at 14:38

## 2021-06-06 RX ADMIN — NYSTATIN: 100000 POWDER TOPICAL at 15:03

## 2021-06-06 RX ADMIN — DULOXETINE HYDROCHLORIDE 20 MG: 20 CAPSULE, DELAYED RELEASE ORAL at 08:36

## 2021-06-06 RX ADMIN — CETIRIZINE HYDROCHLORIDE 10 MG: 10 TABLET, FILM COATED ORAL at 08:36

## 2021-06-06 RX ADMIN — MONTELUKAST SODIUM 10 MG: 10 TABLET, COATED ORAL at 20:25

## 2021-06-06 RX ADMIN — SODIUM BICARBONATE 650 MG: 650 TABLET ORAL at 08:36

## 2021-06-06 RX ADMIN — INSULIN DETEMIR 30 UNITS: 100 INJECTION, SOLUTION SUBCUTANEOUS at 08:36

## 2021-06-06 RX ADMIN — SODIUM BICARBONATE 650 MG: 650 TABLET ORAL at 20:25

## 2021-06-06 RX ADMIN — NYSTATIN: 100000 POWDER TOPICAL at 20:30

## 2021-06-06 RX ADMIN — IPRATROPIUM BROMIDE AND ALBUTEROL SULFATE 3 ML: 2.5; .5 SOLUTION RESPIRATORY (INHALATION) at 19:08

## 2021-06-06 RX ADMIN — CLOPIDOGREL BISULFATE 75 MG: 75 TABLET ORAL at 08:36

## 2021-06-06 RX ADMIN — GABAPENTIN 800 MG: 400 CAPSULE ORAL at 15:03

## 2021-06-06 RX ADMIN — NYSTATIN: 100000 POWDER TOPICAL at 08:38

## 2021-06-06 RX ADMIN — SODIUM CHLORIDE, PRESERVATIVE FREE 10 ML: 5 INJECTION INTRAVENOUS at 20:27

## 2021-06-06 RX ADMIN — HEPARIN SODIUM 5000 UNITS: 5000 INJECTION INTRAVENOUS; SUBCUTANEOUS at 22:51

## 2021-06-06 RX ADMIN — HEPARIN SODIUM 5000 UNITS: 5000 INJECTION INTRAVENOUS; SUBCUTANEOUS at 06:54

## 2021-06-06 RX ADMIN — INSULIN DETEMIR 30 UNITS: 100 INJECTION, SOLUTION SUBCUTANEOUS at 20:44

## 2021-06-06 RX ADMIN — ISOSORBIDE MONONITRATE 30 MG: 30 TABLET, EXTENDED RELEASE ORAL at 08:36

## 2021-06-06 RX ADMIN — INSULIN ASPART 8 UNITS: 100 INJECTION, SOLUTION INTRAVENOUS; SUBCUTANEOUS at 07:51

## 2021-06-06 NOTE — PLAN OF CARE
Goal Outcome Evaluation:  Plan of Care Reviewed With: patient  Progress: no change  Outcome Summary: Vss. No new complaints. No s/s of distress at this time. Will continue to monitor this pt.

## 2021-06-06 NOTE — PROGRESS NOTES
FAMILY MEDICINE DAILY PROGRESS NOTE    NAME: Yamileth Slater  : 1960  MRN: [id number]      LOS: 4 days     PROVIDER OF SERVICE: Froylan Lu MD    Chief Complaint: Acute on chronic respiratory failure with hypoxia and hypercapnia (CMS/HCC)    Subjective:     Interval History:  History taken from: patient  Yamileth Slater 1960 female did well overnight and the patient is normal BiPAP, with no acute events, though patient had periods of bradycardia heart rate trending low at 49-57, overnight team made aware by nursing staff, this morning heart rate returns to normal. VSS. Orientated X3. The patient was responsive to my questions. continuing to use trilogy. Weight decreased from 304 to 301 pounds today. Potassium trends down from 4.8-4.6 today and creatinine began to trend down from 2.51-2.30. Hgb increased from 7.9-8.8.  Patient has normal oxygen 3 L nasal cannula at home, will plan to discharge patient over next few days.  Gabapentin increased to same as home medication dosing.    On exiting the room Yamileth Slater was in no acute distress and was happy to continue with our current plan of care at c.0700hrs.-     Review of Systems:   Review of Systems   Constitutional: Negative for activity change, appetite change, chills, diaphoresis, fatigue and fever.   HENT: Negative for congestion, dental problem, ear discharge, ear pain, hearing loss, mouth sores, nosebleeds, postnasal drip, sinus pain, sore throat, tinnitus, trouble swallowing and voice change.    Eyes: Negative for photophobia, pain, discharge and itching.   Respiratory: Positive for shortness of breath. Negative for cough, choking, chest tightness and wheezing.    Cardiovascular: Negative for chest pain, palpitations and leg swelling.   Gastrointestinal: Negative for abdominal distention, abdominal pain, blood in stool, constipation, diarrhea, nausea and vomiting.   Endocrine: Negative for cold intolerance and heat  intolerance.   Genitourinary: Negative for difficulty urinating, dysuria, flank pain and hematuria.   Musculoskeletal: Positive for arthralgias. Negative for back pain, joint swelling and neck pain.        Left thumb arthralgia   Skin: Negative for color change and rash.   Neurological: Negative for dizziness, tremors, seizures, weakness, light-headedness, numbness and headaches.   Hematological: Negative for adenopathy.   Psychiatric/Behavioral: Negative for behavioral problems, confusion, hallucinations, self-injury and sleep disturbance.       Objective:     Vital Signs  Temp:  [96.5 °F (35.8 °C)-97.3 °F (36.3 °C)] 96.5 °F (35.8 °C)  Heart Rate:  [49-71] 63  Resp:  [16-20] 16  BP: (127-169)/(65-89) 132/89  Flow (L/min):  [3] 3   Body mass index is 55.08 kg/m².    Physical Exam  Physical Exam  Vitals and nursing note reviewed.   Constitutional:       Appearance: Normal appearance. She is not ill-appearing or diaphoretic.   HENT:      Head: Normocephalic and atraumatic.      Right Ear: External ear normal.      Left Ear: External ear normal.      Nose: Nose normal. No rhinorrhea.      Mouth/Throat:      Mouth: Mucous membranes are moist.      Pharynx: No posterior oropharyngeal erythema.   Eyes:      General: No scleral icterus.        Right eye: No discharge.         Left eye: No discharge.      Extraocular Movements: Extraocular movements intact.   Neck:      Vascular: No carotid bruit.   Cardiovascular:      Rate and Rhythm: Normal rate and regular rhythm.      Pulses: Normal pulses.      Heart sounds: Normal heart sounds. No gallop.    Pulmonary:      Effort: Pulmonary effort is normal.      Breath sounds: Rhonchi present.      Comments: Mild wheeze upper apical lobes  Coarse breath sounds  Chest:      Chest wall: No tenderness.   Abdominal:      General: Bowel sounds are normal.      Palpations: Abdomen is soft.      Tenderness: There is no rebound.   Musculoskeletal:         General: No swelling.       Cervical back: No muscular tenderness.      Right lower leg: Edema present.      Left lower leg: Edema present.   Lymphadenopathy:      Cervical: No cervical adenopathy.   Skin:     General: Skin is warm and dry.      Capillary Refill: Capillary refill takes less than 2 seconds.      Findings: No erythema or rash.   Neurological:      General: No focal deficit present.      Mental Status: She is alert and oriented to person, place, and time.      Motor: No weakness.   Psychiatric:         Mood and Affect: Mood normal.         Behavior: Behavior normal.         Thought Content: Thought content normal.         Judgment: Judgment normal.         Scheduled Meds   aspirin, 81 mg, Oral, Daily  atorvastatin, 40 mg, Oral, Nightly  budesonide-formoterol, 2 puff, Inhalation, BID - RT  cetirizine, 10 mg, Oral, Daily  clopidogrel, 75 mg, Oral, Daily  DULoxetine, 20 mg, Oral, Daily  ferrous sulfate, 324 mg, Oral, Daily With Breakfast  furosemide, 20 mg, Oral, Daily  gabapentin, 800 mg, Oral, Nightly  heparin (porcine), 5,000 Units, Subcutaneous, Q8H  insulin aspart, 0-14 Units, Subcutaneous, TID AC  insulin detemir, 30 Units, Subcutaneous, Q12H  ipratropium-albuterol, 3 mL, Nebulization, 4x Daily - RT  isosorbide mononitrate, 30 mg, Oral, Daily  metoprolol tartrate, 100 mg, Oral, Q12H  montelukast, 10 mg, Oral, Nightly  nystatin, , Topical, TID  oxybutynin XL, 5 mg, Oral, Daily  pantoprazole, 40 mg, Oral, Nightly  sodium bicarbonate, 650 mg, Oral, BID  sodium chloride, 10 mL, Intravenous, Q12H  sucralfate, 1 g, Oral, 4x Daily       PRN Meds   cyclobenzaprine  •  dextrose  •  dextrose  •  glucagon (human recombinant)  •  hydrALAZINE  •  ondansetron  •  [COMPLETED] Insert peripheral IV **AND** sodium chloride  •  sodium chloride      Diagnostic Data    Results from last 7 days   Lab Units 06/06/21  0541   WBC 10*3/mm3 7.73   HEMOGLOBIN g/dL 8.8*   HEMATOCRIT % 27.5*   PLATELETS 10*3/mm3 235   GLUCOSE mg/dL 263*   CREATININE mg/dL  2.30*   BUN mg/dL 47*   SODIUM mmol/L 137   POTASSIUM mmol/L 4.6   AST (SGOT) U/L 8   ALT (SGPT) U/L 8   ALK PHOS U/L 105   BILIRUBIN mg/dL 0.2   ANION GAP mmol/L 10.0       No radiology results for the last day      I reviewed the patient's new clinical results.    Assessment/Plan:     Active Hospital Problems    Diagnosis  POA   • **Acute on chronic respiratory failure with hypoxia and hypercapnia (CMS/HCC) [J96.21, J96.22]  Yes     - Duo nebs and albuterol as needed  · Patient brought her own BiPAP to wear at night  · O2 maintain sats between 88-92%  · One time solumedrol in ER  · last echo in April 2021 showed EF 61.3% with moderate concentric hypertrophy     • Acute pulmonary edema (CMS/HCC) [J81.0]  Yes     · CXR shows cardiomegaly and pulmonary infiltrates   · O2 nc at 5L with saturations 90% titrate to maintain saturations 88-92%  · Bumex 1mg bid will hold now  · Incentive spirometry  · Patient supplied BiPAP at night     • Hyperkalemia [E87.5]  Yes     · 6.1 in ER, EKG showed NSR with old RBBB  · Albuterol treatment  · Kayexalate   · Calcium gluconate 1g  · Consider insulin if not improving     • Morbid obesity (CMS/HCC) [E66.01]  Yes     · BMI 49.38  · Likely has obesity hypoventilation syndrome with poor respiratory effort  · Consistent carbohydrate/cardiac/renal diet     • Acute kidney injury superimposed on chronic kidney disease (CMS/HCC) [N17.9, N18.9]  Yes     · Baseline creatinine 1.7 to 1.9 and GFR 25-30  · Hold nephrotoxic agents  · Trend creatinine  · IVF when fluid overload improves  · Continue bicarb tablets  · Continue Lisinopril 10mg       • Pneumonia of both lungs due to infectious organism [J18.9]  Yes     · Ceftriaxone started 6/2/21  · Atypicals negative   · Duo nebs and albuterol as needed  · Continue Symbicort  · ABG as needed  · CXR showed PNA vs congestion     • Acute renal failure superimposed on chronic kidney disease (CMS/HCC) [N17.9, N18.9]  Yes     · Held Empagliflozin due to GFR  < 30  · Bumex 1mg will hold due to increase in Cr 6/5/21  · Trend creatinine  · Hold nephrotoxic agents  · Renal diet     • Uncontrolled type 2 diabetes mellitus with complication, with long-term current use of insulin (CMS/[facility]) [E11.8, E11.65, Z79.4]  Not Applicable     · Home regimen held  · SSI     • Chronic obstructive pulmonary disease (CMS/[facility]) [J44.9]  Yes     · Symbicort  · Duo nebs and albuterol as needed  · Continue montelukast and cetirizine  · Incentive spirometry     • Mixed hyperlipidemia [E78.2]  Yes     · No lipid panel since 6/24/2020  · Repeat lipid panel   · Continue Atorvastatin 40mg     • Coronary artery disease [I25.10]  Yes     · Continue ASA 81mg, Atorvastatin 40mg, Clopidogrel 75mg, Imdur 30mg  · Last cath was 4/30/2021 showed multivessel dz with successful PCI of LM and proximal Cx with dug eluding stent, and PTCA of native ramus and PCI of proximal SVG to ramus with drug eluding stent.      • GERD (gastroesophageal reflux disease) [K21.9]  Yes     · Protonix 40mg IV     • Essential hypertension [I10]  Yes     · /89 in ER, prn hydralazine  · Continue Lisinopril 10mg, Metoprolol 100mg bid     • S/P CABG x 3 [Z95.1]  Not Applicable     · Completed in 2013  · Continue ASA 81mg, Atorvastatin 40mg, Clopidogrel 75mg, Imdur 30mg, Lisinopril 10mg, Metoprolol 100mg bid     • PAD (peripheral artery disease) (CMS/[facility]) [I73.9]  Yes     · Continue ASA 81mg, Clopidogrel 75mg, Atorvastatin 40mg         DVT prophylaxis: Heparin  Code status is   Code Status and Medical Interventions:   Ordered at: 06/02/21 1824     Level Of Support Discussed With:    Patient     Code Status:    CPR     Medical Interventions (Level of Support Prior to Arrest):    Full       Plan for disposition:Where: home      Time: 15 minutes     Signature  [place] [address]  Office: [phone]      This document has been electronically signed by Froylan  MD Aly on June 6, 2021 07:59 CDT

## 2021-06-06 NOTE — NURSING NOTE
Called Dr. Us regarding pt repeat fsbs after 30 units of levemir is 354. Also that pt is bradycardic ranging from 47-59 per telemetry monitor. I did give her metoprolol as scheduled and her heart rate was above 60 bpm. Ordered ekg and 5 units of regular insulin.

## 2021-06-06 NOTE — PLAN OF CARE
Goal Outcome Evaluation:     Progress: no change  Outcome Summary: pt has been bradycardic tonight; she is assymptomatic; she did take her scheduled metoprolol when her heart rate was 62 bpm. no s/s of distress. MD aware. Will continue to monitor.

## 2021-06-07 LAB
ALBUMIN SERPL-MCNC: 3.4 G/DL (ref 3.5–5.2)
ALBUMIN/GLOB SERPL: 0.9 G/DL
ALP SERPL-CCNC: 109 U/L (ref 39–117)
ALT SERPL W P-5'-P-CCNC: 9 U/L (ref 1–33)
ANION GAP SERPL CALCULATED.3IONS-SCNC: 10 MMOL/L (ref 5–15)
AST SERPL-CCNC: 9 U/L (ref 1–32)
BACTERIA SPEC AEROBE CULT: NORMAL
BASOPHILS # BLD AUTO: 0.03 10*3/MM3 (ref 0–0.2)
BASOPHILS NFR BLD AUTO: 0.4 % (ref 0–1.5)
BILIRUB SERPL-MCNC: 0.2 MG/DL (ref 0–1.2)
BUN SERPL-MCNC: 52 MG/DL (ref 8–23)
BUN/CREAT SERPL: 24 (ref 7–25)
CALCIUM SPEC-SCNC: 8.7 MG/DL (ref 8.6–10.5)
CHLORIDE SERPL-SCNC: 103 MMOL/L (ref 98–107)
CO2 SERPL-SCNC: 25 MMOL/L (ref 22–29)
CREAT SERPL-MCNC: 2.17 MG/DL (ref 0.57–1)
DEPRECATED RDW RBC AUTO: 60.1 FL (ref 37–54)
EOSINOPHIL # BLD AUTO: 0.03 10*3/MM3 (ref 0–0.4)
EOSINOPHIL NFR BLD AUTO: 0.4 % (ref 0.3–6.2)
ERYTHROCYTE [DISTWIDTH] IN BLOOD BY AUTOMATED COUNT: 18.2 % (ref 12.3–15.4)
GFR SERPL CREATININE-BSD FRML MDRD: 23 ML/MIN/1.73
GLOBULIN UR ELPH-MCNC: 4 GM/DL
GLUCOSE BLDC GLUCOMTR-MCNC: 256 MG/DL (ref 70–130)
GLUCOSE BLDC GLUCOMTR-MCNC: 294 MG/DL (ref 70–130)
GLUCOSE BLDC GLUCOMTR-MCNC: 312 MG/DL (ref 70–130)
GLUCOSE BLDC GLUCOMTR-MCNC: 353 MG/DL (ref 70–130)
GLUCOSE BLDC GLUCOMTR-MCNC: 363 MG/DL (ref 70–130)
GLUCOSE BLDC GLUCOMTR-MCNC: 387 MG/DL (ref 70–130)
GLUCOSE BLDC GLUCOMTR-MCNC: 404 MG/DL (ref 70–130)
GLUCOSE BLDC GLUCOMTR-MCNC: 439 MG/DL (ref 70–130)
GLUCOSE BLDC GLUCOMTR-MCNC: 450 MG/DL (ref 70–130)
GLUCOSE SERPL-MCNC: 289 MG/DL (ref 65–99)
HCT VFR BLD AUTO: 27.8 % (ref 34–46.6)
HGB BLD-MCNC: 8.5 G/DL (ref 12–15.9)
IMM GRANULOCYTES # BLD AUTO: 0.14 10*3/MM3 (ref 0–0.05)
IMM GRANULOCYTES NFR BLD AUTO: 1.7 % (ref 0–0.5)
LYMPHOCYTES # BLD AUTO: 1.99 10*3/MM3 (ref 0.7–3.1)
LYMPHOCYTES NFR BLD AUTO: 24.7 % (ref 19.6–45.3)
MCH RBC QN AUTO: 28.2 PG (ref 26.6–33)
MCHC RBC AUTO-ENTMCNC: 30.6 G/DL (ref 31.5–35.7)
MCV RBC AUTO: 92.4 FL (ref 79–97)
MONOCYTES # BLD AUTO: 0.54 10*3/MM3 (ref 0.1–0.9)
MONOCYTES NFR BLD AUTO: 6.7 % (ref 5–12)
NEUTROPHILS NFR BLD AUTO: 5.33 10*3/MM3 (ref 1.7–7)
NEUTROPHILS NFR BLD AUTO: 66.1 % (ref 42.7–76)
NRBC BLD AUTO-RTO: 0.4 /100 WBC (ref 0–0.2)
PLATELET # BLD AUTO: 240 10*3/MM3 (ref 140–450)
PMV BLD AUTO: 9.4 FL (ref 6–12)
POTASSIUM SERPL-SCNC: 4.4 MMOL/L (ref 3.5–5.2)
PROT SERPL-MCNC: 7.4 G/DL (ref 6–8.5)
RBC # BLD AUTO: 3.01 10*6/MM3 (ref 3.77–5.28)
SODIUM SERPL-SCNC: 138 MMOL/L (ref 136–145)
TROPONIN T SERPL-MCNC: 0.01 NG/ML (ref 0–0.03)
TROPONIN T SERPL-MCNC: <0.01 NG/ML (ref 0–0.03)
WBC # BLD AUTO: 8.06 10*3/MM3 (ref 3.4–10.8)

## 2021-06-07 PROCEDURE — 63710000001 INSULIN ASPART PER 5 UNITS: Performed by: STUDENT IN AN ORGANIZED HEALTH CARE EDUCATION/TRAINING PROGRAM

## 2021-06-07 PROCEDURE — 93010 ELECTROCARDIOGRAM REPORT: CPT | Performed by: INTERNAL MEDICINE

## 2021-06-07 PROCEDURE — 80053 COMPREHEN METABOLIC PANEL: CPT | Performed by: STUDENT IN AN ORGANIZED HEALTH CARE EDUCATION/TRAINING PROGRAM

## 2021-06-07 PROCEDURE — 82962 GLUCOSE BLOOD TEST: CPT

## 2021-06-07 PROCEDURE — 99232 SBSQ HOSP IP/OBS MODERATE 35: CPT | Performed by: STUDENT IN AN ORGANIZED HEALTH CARE EDUCATION/TRAINING PROGRAM

## 2021-06-07 PROCEDURE — 85025 COMPLETE CBC W/AUTO DIFF WBC: CPT | Performed by: STUDENT IN AN ORGANIZED HEALTH CARE EDUCATION/TRAINING PROGRAM

## 2021-06-07 PROCEDURE — 84484 ASSAY OF TROPONIN QUANT: CPT | Performed by: STUDENT IN AN ORGANIZED HEALTH CARE EDUCATION/TRAINING PROGRAM

## 2021-06-07 PROCEDURE — 25010000002 HEPARIN (PORCINE) PER 1000 UNITS: Performed by: STUDENT IN AN ORGANIZED HEALTH CARE EDUCATION/TRAINING PROGRAM

## 2021-06-07 PROCEDURE — 94799 UNLISTED PULMONARY SVC/PX: CPT

## 2021-06-07 PROCEDURE — 63710000001 INSULIN DETEMIR PER 5 UNITS: Performed by: STUDENT IN AN ORGANIZED HEALTH CARE EDUCATION/TRAINING PROGRAM

## 2021-06-07 PROCEDURE — 94760 N-INVAS EAR/PLS OXIMETRY 1: CPT

## 2021-06-07 PROCEDURE — 93005 ELECTROCARDIOGRAM TRACING: CPT | Performed by: STUDENT IN AN ORGANIZED HEALTH CARE EDUCATION/TRAINING PROGRAM

## 2021-06-07 RX ADMIN — INSULIN ASPART 12 UNITS: 100 INJECTION, SOLUTION INTRAVENOUS; SUBCUTANEOUS at 10:41

## 2021-06-07 RX ADMIN — FUROSEMIDE 20 MG: 20 TABLET ORAL at 08:06

## 2021-06-07 RX ADMIN — INSULIN ASPART 10 UNITS: 100 INJECTION, SOLUTION INTRAVENOUS; SUBCUTANEOUS at 04:08

## 2021-06-07 RX ADMIN — BUDESONIDE AND FORMOTEROL FUMARATE DIHYDRATE 2 PUFF: 160; 4.5 AEROSOL RESPIRATORY (INHALATION) at 07:11

## 2021-06-07 RX ADMIN — INSULIN ASPART 8 UNITS: 100 INJECTION, SOLUTION INTRAVENOUS; SUBCUTANEOUS at 16:35

## 2021-06-07 RX ADMIN — INSULIN ASPART 8 UNITS: 100 INJECTION, SOLUTION INTRAVENOUS; SUBCUTANEOUS at 07:26

## 2021-06-07 RX ADMIN — GABAPENTIN 800 MG: 400 CAPSULE ORAL at 08:07

## 2021-06-07 RX ADMIN — PANTOPRAZOLE SODIUM 40 MG: 40 TABLET, DELAYED RELEASE ORAL at 20:06

## 2021-06-07 RX ADMIN — HEPARIN SODIUM 5000 UNITS: 5000 INJECTION INTRAVENOUS; SUBCUTANEOUS at 06:14

## 2021-06-07 RX ADMIN — NYSTATIN: 100000 POWDER TOPICAL at 08:10

## 2021-06-07 RX ADMIN — SODIUM CHLORIDE, PRESERVATIVE FREE 10 ML: 5 INJECTION INTRAVENOUS at 20:08

## 2021-06-07 RX ADMIN — HEPARIN SODIUM 5000 UNITS: 5000 INJECTION INTRAVENOUS; SUBCUTANEOUS at 21:41

## 2021-06-07 RX ADMIN — IPRATROPIUM BROMIDE AND ALBUTEROL SULFATE 3 ML: 2.5; .5 SOLUTION RESPIRATORY (INHALATION) at 10:53

## 2021-06-07 RX ADMIN — IPRATROPIUM BROMIDE AND ALBUTEROL SULFATE 3 ML: 2.5; .5 SOLUTION RESPIRATORY (INHALATION) at 07:11

## 2021-06-07 RX ADMIN — OXYBUTYNIN CHLORIDE 5 MG: 5 TABLET, EXTENDED RELEASE ORAL at 08:07

## 2021-06-07 RX ADMIN — METOPROLOL TARTRATE 100 MG: 50 TABLET, FILM COATED ORAL at 08:07

## 2021-06-07 RX ADMIN — GABAPENTIN 800 MG: 400 CAPSULE ORAL at 16:35

## 2021-06-07 RX ADMIN — ATORVASTATIN CALCIUM 40 MG: 40 TABLET, FILM COATED ORAL at 20:07

## 2021-06-07 RX ADMIN — INSULIN DETEMIR 30 UNITS: 100 INJECTION, SOLUTION SUBCUTANEOUS at 08:07

## 2021-06-07 RX ADMIN — MONTELUKAST SODIUM 10 MG: 10 TABLET, COATED ORAL at 20:07

## 2021-06-07 RX ADMIN — SODIUM BICARBONATE 650 MG: 650 TABLET ORAL at 20:06

## 2021-06-07 RX ADMIN — HEPARIN SODIUM 5000 UNITS: 5000 INJECTION INTRAVENOUS; SUBCUTANEOUS at 14:30

## 2021-06-07 RX ADMIN — DULOXETINE HYDROCHLORIDE 20 MG: 20 CAPSULE, DELAYED RELEASE ORAL at 08:07

## 2021-06-07 RX ADMIN — IPRATROPIUM BROMIDE AND ALBUTEROL SULFATE 3 ML: 2.5; .5 SOLUTION RESPIRATORY (INHALATION) at 20:56

## 2021-06-07 RX ADMIN — BUDESONIDE AND FORMOTEROL FUMARATE DIHYDRATE 2 PUFF: 160; 4.5 AEROSOL RESPIRATORY (INHALATION) at 20:56

## 2021-06-07 RX ADMIN — SODIUM BICARBONATE 650 MG: 650 TABLET ORAL at 08:06

## 2021-06-07 RX ADMIN — FERROUS SULFATE TAB EC 324 MG (65 MG FE EQUIVALENT) 324 MG: 324 (65 FE) TABLET DELAYED RESPONSE at 08:07

## 2021-06-07 RX ADMIN — GABAPENTIN 800 MG: 400 CAPSULE ORAL at 20:07

## 2021-06-07 RX ADMIN — ISOSORBIDE MONONITRATE 30 MG: 30 TABLET, EXTENDED RELEASE ORAL at 08:07

## 2021-06-07 RX ADMIN — SODIUM CHLORIDE, PRESERVATIVE FREE 10 ML: 5 INJECTION INTRAVENOUS at 08:08

## 2021-06-07 RX ADMIN — CLOPIDOGREL BISULFATE 75 MG: 75 TABLET ORAL at 08:06

## 2021-06-07 RX ADMIN — INSULIN DETEMIR 30 UNITS: 100 INJECTION, SOLUTION SUBCUTANEOUS at 21:40

## 2021-06-07 RX ADMIN — CETIRIZINE HYDROCHLORIDE 10 MG: 10 TABLET, FILM COATED ORAL at 08:07

## 2021-06-07 RX ADMIN — ASPIRIN 81 MG: 81 TABLET, FILM COATED ORAL at 08:07

## 2021-06-07 NOTE — PLAN OF CARE
Goal Outcome Evaluation:           Progress: improving  Outcome Summary: Pt without complaints of pain or discomfort today.  Up in chair for most of the morning.  VSS.  Will continue to monitor.

## 2021-06-07 NOTE — PLAN OF CARE
Goal Outcome Evaluation:     Progress: improving  Outcome Summary: pt still bradycardic at night; pt has rested well through night; no s/s of distress; valerio continue to monitor.

## 2021-06-07 NOTE — NURSING NOTE
Called Dr. Jaramillo regarding repeat fsbs of 397. Ordered to check at 0300 and let her know results. Also notified her of holding metoprolol r/t bradycardia

## 2021-06-07 NOTE — PROGRESS NOTES
FAMILY MEDICINE DAILY PROGRESS NOTE  NAME: Yamileth Slater  : 1960  MRN: [id number]     LOS: 5 days     PROVIDER OF SERVICE: Nirmal Calvillo MD    Chief Complaint: Acute on chronic respiratory failure with hypoxia and hypercapnia (CMS/HCC)    Subjective:     Interval History:  History taken from: patient chart      Review of Systems:   Review of Systems   Constitutional: Negative for activity change and appetite change.   HENT: Negative for congestion and trouble swallowing.    Respiratory: Negative for chest tightness and shortness of breath.    Cardiovascular: Positive for chest pain (Chest tightness). Negative for palpitations.   Gastrointestinal: Negative for abdominal distention and abdominal pain.   Genitourinary: Negative for difficulty urinating and dysuria.   Musculoskeletal: Negative for arthralgias and myalgias.   Skin: Negative for color change and pallor.   Neurological: Negative for dizziness, light-headedness and headaches.   Psychiatric/Behavioral: Negative for agitation and behavioral problems.       Objective:     Vital Signs  Temp:  [96.4 °F (35.8 °C)-97.5 °F (36.4 °C)] 97.5 °F (36.4 °C)  Heart Rate:  [53-67] 67  Resp:  [16-20] 18  BP: (132-174)/(60-93) 132/60    Physical Exam  Physical Exam  Constitutional:       General: She is not in acute distress.     Appearance: She is well-developed.   HENT:      Head: Normocephalic and atraumatic.      Right Ear: External ear normal.      Left Ear: External ear normal.      Nose: Nose normal.   Eyes:      Extraocular Movements: Extraocular movements intact.      Pupils: Pupils are equal, round, and reactive to light.   Cardiovascular:      Rate and Rhythm: Normal rate and regular rhythm.      Heart sounds: Normal heart sounds. No murmur heard.   No friction rub. No gallop.    Pulmonary:      Effort: Pulmonary effort is normal. No respiratory distress.      Breath sounds: Normal breath sounds. No wheezing or rales.   Abdominal:       General: Bowel sounds are normal. There is no distension.      Palpations: Abdomen is soft.      Tenderness: There is no abdominal tenderness.   Musculoskeletal:         General: Normal range of motion.      Cervical back: Normal range of motion and neck supple.      Right lower leg: No edema.      Left lower leg: No edema.   Skin:     General: Skin is warm.      Findings: No erythema.   Neurological:      Mental Status: She is alert and oriented to person, place, and time. Mental status is at baseline.   Psychiatric:         Mood and Affect: Mood normal.         Behavior: Behavior normal.         Medication Review    Current Facility-Administered Medications:   •  aspirin EC tablet 81 mg, 81 mg, Oral, Daily, Haily Mcneil MD, 81 mg at 06/06/21 0836  •  atorvastatin (LIPITOR) tablet 40 mg, 40 mg, Oral, Nightly, Haily Mcneil MD, 40 mg at 06/06/21 2025  •  budesonide-formoterol (SYMBICORT) 160-4.5 MCG/ACT inhaler 2 puff, 2 puff, Inhalation, BID - RT, Haily Mcneil MD, 2 puff at 06/07/21 0711  •  cetirizine (zyrTEC) tablet 10 mg, 10 mg, Oral, Daily, Haily Mcneil MD, 10 mg at 06/06/21 0836  •  clopidogrel (PLAVIX) tablet 75 mg, 75 mg, Oral, Daily, Haily Mcneil MD, 75 mg at 06/06/21 0836  •  cyclobenzaprine (FLEXERIL) tablet 10 mg, 10 mg, Oral, BID PRN, Haily Mcneil MD, 10 mg at 06/06/21 0053  •  dextrose (D50W) 25 g/ 50mL Intravenous Solution 25 g, 25 g, Intravenous, Q15 Min PRN, Haily Mcneil MD  •  dextrose (GLUTOSE) oral gel 15 g, 15 g, Oral, Q15 Min PRN, Haily Mcneil MD  •  DULoxetine (CYMBALTA) DR capsule 20 mg, 20 mg, Oral, Daily, Haily Mcneil MD, 20 mg at 06/06/21 0836  •  ferrous sulfate EC tablet 324 mg, 324 mg, Oral, Daily With Breakfast, Haily Mcneil MD, 324 mg at 06/06/21 0836  •  furosemide (LASIX) tablet 20 mg, 20 mg, Oral, Daily, Nirmal Calvillo MD, 20 mg at 06/06/21 0836  •  gabapentin (NEURONTIN) capsule 800 mg, 800 mg, Oral, TID, Froylan Lu MD, 800 mg at 06/06/21 2025  •   glucagon (human recombinant) (GLUCAGEN DIAGNOSTIC) injection 1 mg, 1 mg, Subcutaneous, Q15 Min PRN, Haily Mcneil MD  •  heparin (porcine) 5000 UNIT/ML injection 5,000 Units, 5,000 Units, Subcutaneous, Q8H, Haily Mcneil MD, 5,000 Units at 06/07/21 0614  •  hydrALAZINE (APRESOLINE) injection 10 mg, 10 mg, Intravenous, Q6H PRN, Nirmal Calvillo MD  •  insulin aspart (novoLOG) injection 0-14 Units, 0-14 Units, Subcutaneous, TID AC, Haily Mcneil MD, 8 Units at 06/07/21 0726  •  insulin detemir (LEVEMIR) injection 30 Units, 30 Units, Subcutaneous, Q12H, Nirmal Calvillo MD, 30 Units at 06/06/21 2044  •  ipratropium-albuterol (DUO-NEB) nebulizer solution 3 mL, 3 mL, Nebulization, 4x Daily - RT, Haily Mcneil MD, 3 mL at 06/07/21 0711  •  isosorbide mononitrate (IMDUR) 24 hr tablet 30 mg, 30 mg, Oral, Daily, Haily Mcneil MD, 30 mg at 06/06/21 0836  •  metoprolol tartrate (LOPRESSOR) tablet 100 mg, 100 mg, Oral, Q12H, Haily Mcneil MD, 100 mg at 06/06/21 0836  •  montelukast (SINGULAIR) tablet 10 mg, 10 mg, Oral, Nightly, Haily Mcneil MD, 10 mg at 06/06/21 2025  •  nystatin (MYCOSTATIN) powder, , Topical, TID, Haily Mcneil MD, Given at 06/06/21 2030  •  ondansetron (ZOFRAN) injection 4 mg, 4 mg, Intravenous, Q6H PRN, Haily Mcneil MD  •  oxybutynin XL (DITROPAN-XL) 24 hr tablet 5 mg, 5 mg, Oral, Daily, Haily Mcneil MD, 5 mg at 06/06/21 0836  •  pantoprazole (PROTONIX) EC tablet 40 mg, 40 mg, Oral, Nightly, Haily Mcneil MD, 40 mg at 06/06/21 2025  •  sodium bicarbonate tablet 650 mg, 650 mg, Oral, BID, Haily Mcneil MD, 650 mg at 06/06/21 2025  •  [COMPLETED] Insert peripheral IV, , , Once **AND** sodium chloride 0.9 % flush 10 mL, 10 mL, Intravenous, PRN, Haily Mcneil MD  •  sodium chloride 0.9 % flush 10 mL, 10 mL, Intravenous, Q12H, Haily Mcenil MD, 10 mL at 06/06/21 2027  •  sodium chloride 0.9 % flush 10 mL, 10 mL, Intravenous, PRN, Haily Mcneil MD  •  sucralfate (CARAFATE) tablet 1 g, 1  g, Oral, 4x Daily, Haily Mcneil MD, Stopped at 06/03/21 1800     Diagnostic Data    Lab Results (last 24 hours)     Procedure Component Value Units Date/Time    POC Glucose Once [[id number]]  (Abnormal) Collected: 06/07/21 0635    Specimen: Blood Updated: 06/07/21 0649     Glucose 256 mg/dL      Comment: Result Not ConfirmedOperator: [id number] SUMMERS ANDREAMeter ID: [id number]       Blood Culture - Blood, Arm, Right [[id number]] Collected: 06/03/21 0558    Specimen: Blood from Arm, Right Updated: 06/07/21 0615     Blood Culture No growth at 4 days    Comprehensive Metabolic Panel [[id number]]  (Abnormal) Collected: 06/07/21 0507    Specimen: Blood Updated: 06/07/21 0613     Glucose 289 mg/dL      BUN 52 mg/dL      Creatinine 2.17 mg/dL      Sodium 138 mmol/L      Potassium 4.4 mmol/L      Chloride 103 mmol/L      CO2 25.0 mmol/L      Calcium 8.7 mg/dL      Total Protein 7.4 g/dL      Albumin 3.40 g/dL      ALT (SGPT) 9 U/L      AST (SGOT) 9 U/L      Alkaline Phosphatase 109 U/L      Total Bilirubin 0.2 mg/dL      eGFR Non African Amer 23 mL/min/1.73      Globulin 4.0 gm/dL      A/G Ratio 0.9 g/dL      BUN/Creatinine Ratio 24.0     Anion Gap 10.0 mmol/L     Narrative:      GFR Normal >60  Chronic Kidney Disease <60  Kidney Failure <15      CBC Auto Differential [[id number]]  (Abnormal) Collected: 06/07/21 0507    Specimen: Blood Updated: 06/07/21 0602     WBC 8.06 10*3/mm3      RBC 3.01 10*6/mm3      Hemoglobin 8.5 g/dL      Hematocrit 27.8 %      MCV 92.4 fL      MCH 28.2 pg      MCHC 30.6 g/dL      RDW 18.2 %      RDW-SD 60.1 fl      MPV 9.4 fL      Platelets 240 10*3/mm3      Neutrophil % 66.1 %      Lymphocyte % 24.7 %      Monocyte % 6.7 %      Eosinophil % 0.4 %      Basophil % 0.4 %      Immature Grans % 1.7 %      Neutrophils, Absolute 5.33 10*3/mm3      Lymphocytes, Absolute 1.99 10*3/mm3      Monocytes, Absolute 0.54 10*3/mm3      Eosinophils, Absolute 0.03 10*3/mm3      Basophils, Absolute 0.03 10*3/mm3       Immature Grans, Absolute 0.14 10*3/mm3      nRBC 0.4 /100 WBC     POC Glucose Once [[id number]]  (Abnormal) Collected: 06/07/21 0345    Specimen: Blood Updated: 06/07/21 0401     Glucose 312 mg/dL      Comment: RN NotifiedOperator: [id number] SUMMERS ANDREAMeter ID: [id number]       POC Glucose Once [[id number]]  (Abnormal) Collected: 06/07/21 0146    Specimen: Blood Updated: 06/07/21 0159     Glucose 353 mg/dL      Comment: RN NotifiedOperator: [id number] SUMMERS ANDREAMeter ID: [id number]       POC Glucose Once [[id number]]  (Abnormal) Collected: 06/06/21 2212    Specimen: Blood Updated: 06/06/21 2224     Glucose 397 mg/dL      Comment: RN NotifiedOperator: [id number] JUDDLIBERTAD HUFFMeter ID: [id number]       Blood Culture - Blood, Arm, Left [[id number]] Collected: 06/02/21 1646    Specimen: Blood from Arm, Left Updated: 06/06/21 1700     Blood Culture No growth at 4 days    POC Glucose Once [[id number]]  (Abnormal) Collected: 06/06/21 1622    Specimen: Blood Updated: 06/06/21 1639     Glucose 308 mg/dL      Comment: RN NotifiedOperator: [id number] LUCILA JUNEMeter ID: [id number]              Imaging Results (Last 24 Hours)     ** No results found for the last 24 hours. **          I reviewed the patient's new clinical results.    Assessment/Plan:     Active Hospital Problems    Diagnosis    • **Acute on chronic respiratory failure with hypoxia and hypercapnia (CMS/HCC)      - Duo nebs and albuterol as needed  · Patient brought her own BiPAP to wear at night  · O2 maintain sats between 88-92%  · One time solumedrol in ER  · last echo in April 2021 showed EF 61.3% with moderate concentric hypertrophy. Repeat 6/5/21 EF>70%  • 6/6/2021 .  Patient has normal oxygen 3 L nasal cannula at home.     • Acute pulmonary edema (CMS/HCC)      · CXR shows cardiomegaly and pulmonary infiltrates   · O2 nc at 5L with saturations 90% titrate to maintain saturations 88-92%  · Bumex 1mg bid will hold now  · Incentive  spirometry  · Patient supplied BiPAP at night     • Hyperkalemia      · 6.1 in ER, EKG showed NSR with old RBBB  · Albuterol treatment  · Kayexalate   · Calcium gluconate 1g  · Consider insulin if not improving     • Morbid obesity (CMS/[facility])      · BMI 49.38  · Likely has obesity hypoventilation syndrome with poor respiratory effort  · Consistent carbohydrate/cardiac/renal diet     • Acute kidney injury superimposed on chronic kidney disease (CMS/[facility])      · Baseline creatinine 1.7 to 1.9 and GFR 25-30  · Hold nephrotoxic agents  · Trend creatinine  · IVF when fluid overload improves  · Continue bicarb tablets  · Continue Lisinopril 10mg       • Pneumonia of both lungs due to infectious organism      · Ceftriaxone started 6/2/21  · Atypicals negative   · Duo nebs and albuterol as needed  · Continue Symbicort  · ABG as needed  · CXR showed PNA vs congestion     • Acute renal failure superimposed on chronic kidney disease (CMS/[facility])      · Held Empagliflozin due to GFR < 30  · Bumex 1mg will hold due to increase in Cr 6/5/21  · Trend creatinine  · Hold nephrotoxic agents  · Renal diet     • Uncontrolled type 2 diabetes mellitus with complication, with long-term current use of insulin (CMS/[facility])      · Home regimen held  · SSI     • Chronic obstructive pulmonary disease (CMS/[facility])      · Symbicort  · Duo nebs and albuterol as needed  · Continue montelukast and cetirizine  · Incentive spirometry     • Mixed hyperlipidemia      · No lipid panel since 6/24/2020  · Repeat lipid panel   · Continue Atorvastatin 40mg     • Coronary artery disease      · Continue ASA 81mg, Atorvastatin 40mg, Clopidogrel 75mg, Imdur 30mg  · Last cath was 4/30/2021 showed multivessel dz with successful PCI of LM and proximal Cx with dug eluding stent, and PTCA of native ramus and PCI of proximal SVG to ramus with drug eluding stent.      • GERD (gastroesophageal reflux disease)      · Protonix 40mg IV     • Essential hypertension      · /89  in ER, prn hydralazine  · Continue Lisinopril 10mg, Metoprolol 100mg bid     • S/P CABG x 3      · Completed in 2013  · Continue ASA 81mg, Atorvastatin 40mg, Clopidogrel 75mg, Imdur 30mg, Lisinopril 10mg, Metoprolol 100mg bid     • PAD (peripheral artery disease) (CMS/HCC)      · Continue ASA 81mg, Clopidogrel 75mg, Atorvastatin 40mg           DVT prophylaxis: Heparin  Code Status and Medical Interventions:   Ordered at: 06/02/21 1824     Level Of Support Discussed With:    Patient     Code Status:    CPR     Medical Interventions (Level of Support Prior to Arrest):    Full       Plan for disposition:Where: current living arrangements and When:  1-2 days      Time: 15 min           This document has been electronically signed by Nirmal Calvillo MD on June 7, 2021 08:03 CDT

## 2021-06-07 NOTE — NURSING NOTE
Called Dr. Jaramillo regarding fsbs 450 with repeat being 439. MD states to give the 30 units of levemir as ordered and recheck in a few hours. Will continue to monitor   Detail Level: Detailed Add 37527 Cpt? (Important Note: In 2017 The Use Of 45811 Is Being Tracked By Cms To Determine Future Global Period Reimbursement For Global Periods): no

## 2021-06-08 ENCOUNTER — DOCUMENTATION (OUTPATIENT)
Dept: CARDIAC REHAB | Facility: HOSPITAL | Age: 61
End: 2021-06-08

## 2021-06-08 LAB
ALBUMIN SERPL-MCNC: 3.3 G/DL (ref 3.5–5.2)
ALBUMIN/GLOB SERPL: 0.8 G/DL
ALP SERPL-CCNC: 125 U/L (ref 39–117)
ALT SERPL W P-5'-P-CCNC: 9 U/L (ref 1–33)
ANION GAP SERPL CALCULATED.3IONS-SCNC: 8 MMOL/L (ref 5–15)
AST SERPL-CCNC: 12 U/L (ref 1–32)
BACTERIA SPEC AEROBE CULT: NORMAL
BASOPHILS # BLD AUTO: 0.08 10*3/MM3 (ref 0–0.2)
BASOPHILS NFR BLD AUTO: 1 % (ref 0–1.5)
BILIRUB SERPL-MCNC: 0.2 MG/DL (ref 0–1.2)
BUN SERPL-MCNC: 57 MG/DL (ref 8–23)
BUN/CREAT SERPL: 21.9 (ref 7–25)
CALCIUM SPEC-SCNC: 8.3 MG/DL (ref 8.6–10.5)
CHLORIDE SERPL-SCNC: 104 MMOL/L (ref 98–107)
CO2 SERPL-SCNC: 24 MMOL/L (ref 22–29)
CREAT SERPL-MCNC: 2.6 MG/DL (ref 0.57–1)
DEPRECATED RDW RBC AUTO: 62.5 FL (ref 37–54)
EOSINOPHIL # BLD AUTO: 0.18 10*3/MM3 (ref 0–0.4)
EOSINOPHIL NFR BLD AUTO: 2.3 % (ref 0.3–6.2)
ERYTHROCYTE [DISTWIDTH] IN BLOOD BY AUTOMATED COUNT: 18.3 % (ref 12.3–15.4)
GFR SERPL CREATININE-BSD FRML MDRD: 19 ML/MIN/1.73
GLOBULIN UR ELPH-MCNC: 3.9 GM/DL
GLUCOSE BLDC GLUCOMTR-MCNC: 221 MG/DL (ref 70–130)
GLUCOSE BLDC GLUCOMTR-MCNC: 349 MG/DL (ref 70–130)
GLUCOSE BLDC GLUCOMTR-MCNC: 366 MG/DL (ref 70–130)
GLUCOSE BLDC GLUCOMTR-MCNC: 404 MG/DL (ref 70–130)
GLUCOSE SERPL-MCNC: 390 MG/DL (ref 65–99)
HCT VFR BLD AUTO: 30.5 % (ref 34–46.6)
HGB BLD-MCNC: 9.4 G/DL (ref 12–15.9)
IMM GRANULOCYTES # BLD AUTO: 0.18 10*3/MM3 (ref 0–0.05)
IMM GRANULOCYTES NFR BLD AUTO: 2.3 % (ref 0–0.5)
LYMPHOCYTES # BLD AUTO: 2.53 10*3/MM3 (ref 0.7–3.1)
LYMPHOCYTES NFR BLD AUTO: 32.4 % (ref 19.6–45.3)
MCH RBC QN AUTO: 29.3 PG (ref 26.6–33)
MCHC RBC AUTO-ENTMCNC: 30.8 G/DL (ref 31.5–35.7)
MCV RBC AUTO: 95 FL (ref 79–97)
MONOCYTES # BLD AUTO: 0.59 10*3/MM3 (ref 0.1–0.9)
MONOCYTES NFR BLD AUTO: 7.6 % (ref 5–12)
NEUTROPHILS NFR BLD AUTO: 4.24 10*3/MM3 (ref 1.7–7)
NEUTROPHILS NFR BLD AUTO: 54.4 % (ref 42.7–76)
NRBC BLD AUTO-RTO: 0 /100 WBC (ref 0–0.2)
PLATELET # BLD AUTO: 215 10*3/MM3 (ref 140–450)
PMV BLD AUTO: 9.3 FL (ref 6–12)
POTASSIUM SERPL-SCNC: 5.4 MMOL/L (ref 3.5–5.2)
PROT SERPL-MCNC: 7.2 G/DL (ref 6–8.5)
RBC # BLD AUTO: 3.21 10*6/MM3 (ref 3.77–5.28)
SODIUM SERPL-SCNC: 136 MMOL/L (ref 136–145)
TROPONIN T SERPL-MCNC: 0.01 NG/ML (ref 0–0.03)
WBC # BLD AUTO: 7.8 10*3/MM3 (ref 3.4–10.8)

## 2021-06-08 PROCEDURE — 94760 N-INVAS EAR/PLS OXIMETRY 1: CPT

## 2021-06-08 PROCEDURE — 25010000002 HEPARIN (PORCINE) PER 1000 UNITS: Performed by: STUDENT IN AN ORGANIZED HEALTH CARE EDUCATION/TRAINING PROGRAM

## 2021-06-08 PROCEDURE — 94799 UNLISTED PULMONARY SVC/PX: CPT

## 2021-06-08 PROCEDURE — 63710000001 INSULIN ASPART PER 5 UNITS: Performed by: STUDENT IN AN ORGANIZED HEALTH CARE EDUCATION/TRAINING PROGRAM

## 2021-06-08 PROCEDURE — 99232 SBSQ HOSP IP/OBS MODERATE 35: CPT | Performed by: STUDENT IN AN ORGANIZED HEALTH CARE EDUCATION/TRAINING PROGRAM

## 2021-06-08 PROCEDURE — 36415 COLL VENOUS BLD VENIPUNCTURE: CPT | Performed by: STUDENT IN AN ORGANIZED HEALTH CARE EDUCATION/TRAINING PROGRAM

## 2021-06-08 PROCEDURE — 63710000001 INSULIN DETEMIR PER 5 UNITS: Performed by: STUDENT IN AN ORGANIZED HEALTH CARE EDUCATION/TRAINING PROGRAM

## 2021-06-08 PROCEDURE — 85025 COMPLETE CBC W/AUTO DIFF WBC: CPT | Performed by: STUDENT IN AN ORGANIZED HEALTH CARE EDUCATION/TRAINING PROGRAM

## 2021-06-08 PROCEDURE — 82962 GLUCOSE BLOOD TEST: CPT

## 2021-06-08 PROCEDURE — 80053 COMPREHEN METABOLIC PANEL: CPT | Performed by: STUDENT IN AN ORGANIZED HEALTH CARE EDUCATION/TRAINING PROGRAM

## 2021-06-08 PROCEDURE — 84484 ASSAY OF TROPONIN QUANT: CPT | Performed by: STUDENT IN AN ORGANIZED HEALTH CARE EDUCATION/TRAINING PROGRAM

## 2021-06-08 RX ORDER — SODIUM POLYSTYRENE SULFONATE 15 G/60ML
30 SUSPENSION ORAL; RECTAL ONCE
Status: COMPLETED | OUTPATIENT
Start: 2021-06-08 | End: 2021-06-08

## 2021-06-08 RX ADMIN — GABAPENTIN 800 MG: 400 CAPSULE ORAL at 08:02

## 2021-06-08 RX ADMIN — NYSTATIN: 100000 POWDER TOPICAL at 08:08

## 2021-06-08 RX ADMIN — IPRATROPIUM BROMIDE AND ALBUTEROL SULFATE 3 ML: 2.5; .5 SOLUTION RESPIRATORY (INHALATION) at 12:03

## 2021-06-08 RX ADMIN — PANTOPRAZOLE SODIUM 40 MG: 40 TABLET, DELAYED RELEASE ORAL at 21:25

## 2021-06-08 RX ADMIN — INSULIN DETEMIR 40 UNITS: 100 INJECTION, SOLUTION SUBCUTANEOUS at 20:51

## 2021-06-08 RX ADMIN — ASPIRIN 81 MG: 81 TABLET, FILM COATED ORAL at 08:04

## 2021-06-08 RX ADMIN — SODIUM BICARBONATE 650 MG: 650 TABLET ORAL at 08:01

## 2021-06-08 RX ADMIN — ISOSORBIDE MONONITRATE 30 MG: 30 TABLET, EXTENDED RELEASE ORAL at 08:03

## 2021-06-08 RX ADMIN — HEPARIN SODIUM 5000 UNITS: 5000 INJECTION INTRAVENOUS; SUBCUTANEOUS at 05:50

## 2021-06-08 RX ADMIN — METOPROLOL TARTRATE 100 MG: 50 TABLET, FILM COATED ORAL at 08:03

## 2021-06-08 RX ADMIN — IPRATROPIUM BROMIDE AND ALBUTEROL SULFATE 3 ML: 2.5; .5 SOLUTION RESPIRATORY (INHALATION) at 15:11

## 2021-06-08 RX ADMIN — SODIUM CHLORIDE, PRESERVATIVE FREE 10 ML: 5 INJECTION INTRAVENOUS at 21:25

## 2021-06-08 RX ADMIN — IPRATROPIUM BROMIDE AND ALBUTEROL SULFATE 3 ML: 2.5; .5 SOLUTION RESPIRATORY (INHALATION) at 20:00

## 2021-06-08 RX ADMIN — INSULIN DETEMIR 30 UNITS: 100 INJECTION, SOLUTION SUBCUTANEOUS at 08:05

## 2021-06-08 RX ADMIN — SODIUM CHLORIDE, PRESERVATIVE FREE 10 ML: 5 INJECTION INTRAVENOUS at 08:05

## 2021-06-08 RX ADMIN — OXYBUTYNIN CHLORIDE 5 MG: 5 TABLET, EXTENDED RELEASE ORAL at 08:02

## 2021-06-08 RX ADMIN — HEPARIN SODIUM 5000 UNITS: 5000 INJECTION INTRAVENOUS; SUBCUTANEOUS at 21:58

## 2021-06-08 RX ADMIN — FERROUS SULFATE TAB EC 324 MG (65 MG FE EQUIVALENT) 324 MG: 324 (65 FE) TABLET DELAYED RESPONSE at 08:01

## 2021-06-08 RX ADMIN — INSULIN ASPART 12 UNITS: 100 INJECTION, SOLUTION INTRAVENOUS; SUBCUTANEOUS at 10:51

## 2021-06-08 RX ADMIN — NYSTATIN: 100000 POWDER TOPICAL at 16:49

## 2021-06-08 RX ADMIN — ATORVASTATIN CALCIUM 40 MG: 40 TABLET, FILM COATED ORAL at 21:25

## 2021-06-08 RX ADMIN — CYCLOBENZAPRINE 10 MG: 10 TABLET, FILM COATED ORAL at 21:24

## 2021-06-08 RX ADMIN — METOPROLOL TARTRATE 100 MG: 50 TABLET, FILM COATED ORAL at 21:25

## 2021-06-08 RX ADMIN — BUDESONIDE AND FORMOTEROL FUMARATE DIHYDRATE 2 PUFF: 160; 4.5 AEROSOL RESPIRATORY (INHALATION) at 20:00

## 2021-06-08 RX ADMIN — MONTELUKAST SODIUM 10 MG: 10 TABLET, COATED ORAL at 21:25

## 2021-06-08 RX ADMIN — GABAPENTIN 800 MG: 400 CAPSULE ORAL at 21:25

## 2021-06-08 RX ADMIN — CYCLOBENZAPRINE 10 MG: 10 TABLET, FILM COATED ORAL at 04:04

## 2021-06-08 RX ADMIN — CETIRIZINE HYDROCHLORIDE 10 MG: 10 TABLET, FILM COATED ORAL at 08:03

## 2021-06-08 RX ADMIN — HEPARIN SODIUM 5000 UNITS: 5000 INJECTION INTRAVENOUS; SUBCUTANEOUS at 13:10

## 2021-06-08 RX ADMIN — IPRATROPIUM BROMIDE AND ALBUTEROL SULFATE 3 ML: 2.5; .5 SOLUTION RESPIRATORY (INHALATION) at 07:46

## 2021-06-08 RX ADMIN — CLOPIDOGREL BISULFATE 75 MG: 75 TABLET ORAL at 08:02

## 2021-06-08 RX ADMIN — DULOXETINE HYDROCHLORIDE 20 MG: 20 CAPSULE, DELAYED RELEASE ORAL at 08:04

## 2021-06-08 RX ADMIN — FUROSEMIDE 20 MG: 20 TABLET ORAL at 08:03

## 2021-06-08 RX ADMIN — GABAPENTIN 800 MG: 400 CAPSULE ORAL at 16:49

## 2021-06-08 RX ADMIN — SODIUM POLYSTYRENE SULFONATE 30 G: 15 SUSPENSION ORAL; RECTAL at 10:26

## 2021-06-08 RX ADMIN — INSULIN ASPART 5 UNITS: 100 INJECTION, SOLUTION INTRAVENOUS; SUBCUTANEOUS at 16:48

## 2021-06-08 RX ADMIN — BUDESONIDE AND FORMOTEROL FUMARATE DIHYDRATE 2 PUFF: 160; 4.5 AEROSOL RESPIRATORY (INHALATION) at 07:46

## 2021-06-08 RX ADMIN — SODIUM BICARBONATE 650 MG: 650 TABLET ORAL at 21:25

## 2021-06-08 RX ADMIN — INSULIN ASPART 10 UNITS: 100 INJECTION, SOLUTION INTRAVENOUS; SUBCUTANEOUS at 08:04

## 2021-06-08 NOTE — CONSULTS
Adult Nutrition  Assessment    Patient Name:  Yamileth Slater  YOB: 1960  MRN: [id number]  Admit Date:  6/2/2021    Assessment Date:  6/8/2021    Comments: Visited due to LOS.  Pt is currently in Isolation.  She has been asleep most of the day.  She is being managed for A/C Resp failure; Acute plumonary edema; hypoventilation syndrome with poor resp effort related to her obesity; ARF/CRD with elevated Bun, Creat, and K+; Pneumonia and COPD.  She presents at 271% of her IBW with a BMI of 54.55 compatible with morbid obesity.  Lately her glucose has been significantly elevated >300, covered by SSI and Levemir.  Will attempt to see pt tomorrow.  She is eating well with intake typically 100%.  MOnitor.      Reason for Assessment     Row Name 06/08/21 1546          Reason for Assessment    Reason For Assessment  per organizational policy     Diagnosis  pulmonary disease;renal disease     Identified At Risk by Screening Criteria  other (see comments)         Nutrition/Diet History     Row Name 06/08/21 1544          Nutrition/Diet History    Typical Food/Fluid Intake  Pt in Isolation for Aersol precautions.  She is requiring Bipap and per staff she has been sleeping most of the day.           Labs/Tests/Procedures/Meds     Row Name 06/08/21 1542          Labs/Procedures/Meds    Lab Results Reviewed  reviewed, pertinent        Diagnostic Tests/Procedures    Diagnostic Test/Procedure Reviewed  reviewed, pertinent        Medications    Pertinent Medications Reviewed  reviewed, pertinent         Physical Findings     Row Name 06/08/21 154          Physical Findings    Overall Physical Appearance  obese;on oxygen therapy         Estimated/Assessed Needs     Row Name 06/08/21 154          Calculation Measurements    Weight Used For Calculations  135 kg (298 lb)         Nutrition Prescription Ordered     Row Name 06/08/21 1545          Nutrition Prescription PO    Current PO Diet  Regular     Fluid  Consistency  Thin     Common Modifiers  Consistent Carbohydrate;Renal;Cardiac         Evaluation of Received Nutrient/Fluid Intake     Row Name 06/08/21 1544          PO Evaluation    Number of Meals  5     % PO Intake  90% average               Electronically signed by:  Gissel Littlejohn RD  06/08/21 15:47 CDT

## 2021-06-08 NOTE — DISCHARGE INSTR - OTHER ORDERS
[facility]  [address] [place] [address] [address] [place] [address] [phone]  REQUIRES 72hr notice for transport.

## 2021-06-08 NOTE — PROGRESS NOTES
FAMILY MEDICINE DAILY PROGRESS NOTE  NAME: Yamileth Slater  : 1960  MRN: [id number]     LOS: 6 days     PROVIDER OF SERVICE: Nirmal Calvillo MD    Chief Complaint: Acute on chronic respiratory failure with hypoxia and hypercapnia (CMS/HCC)    Subjective:     Interval History:  History taken from: patient chart  VSS, potassium increased to 5.4 today so will provide kayexalate. Cr increased 2.17 to 2.60. Hgb increasing from 8.5 to 9.4. Troponin negative x2 and mildly increased to 0.015 will repeat today. ECG with no signs of ischemia. States chest pain improving today.    Review of Systems:   Review of Systems   Constitutional: Negative for activity change and appetite change.   HENT: Negative for congestion and trouble swallowing.    Respiratory: Negative for chest tightness and shortness of breath.    Cardiovascular: Positive for chest pain (Mild). Negative for palpitations.   Gastrointestinal: Negative for abdominal distention and abdominal pain.   Genitourinary: Negative for difficulty urinating and dysuria.   Musculoskeletal: Negative for arthralgias and myalgias.   Skin: Negative for color change and pallor.   Neurological: Negative for dizziness, light-headedness and headaches.   Psychiatric/Behavioral: Negative for agitation and behavioral problems.       Objective:     Vital Signs  Temp:  [97.2 °F (36.2 °C)-98.4 °F (36.9 °C)] 97.6 °F (36.4 °C)  Heart Rate:  [51-69] 51  Resp:  [16-20] 18  BP: (147-172)/(65-74) 172/74    Physical Exam  Physical Exam  Constitutional:       General: She is not in acute distress.     Appearance: She is well-developed.   HENT:      Head: Normocephalic and atraumatic.      Right Ear: External ear normal.      Left Ear: External ear normal.      Nose: Nose normal.   Eyes:      Extraocular Movements: Extraocular movements intact.      Pupils: Pupils are equal, round, and reactive to light.   Cardiovascular:      Rate and Rhythm: Normal rate and regular rhythm.       Heart sounds: Normal heart sounds. No murmur heard.   No friction rub. No gallop.    Pulmonary:      Effort: Pulmonary effort is normal. No respiratory distress.      Breath sounds: Normal breath sounds. No wheezing or rales.   Abdominal:      General: Bowel sounds are normal. There is no distension.      Palpations: Abdomen is soft.      Tenderness: There is no abdominal tenderness.   Musculoskeletal:         General: Normal range of motion.      Cervical back: Normal range of motion and neck supple.      Right lower leg: No edema.      Left lower leg: No edema.   Skin:     General: Skin is warm.      Findings: No erythema.   Neurological:      Mental Status: She is alert and oriented to person, place, and time. Mental status is at baseline.   Psychiatric:         Mood and Affect: Mood normal.         Behavior: Behavior normal.         Medication Review    Current Facility-Administered Medications:   •  aspirin EC tablet 81 mg, 81 mg, Oral, Daily, Haily Mcneil MD, 81 mg at 06/07/21 0807  •  atorvastatin (LIPITOR) tablet 40 mg, 40 mg, Oral, Nightly, Haily Mcneil MD, 40 mg at 06/07/21 2007  •  budesonide-formoterol (SYMBICORT) 160-4.5 MCG/ACT inhaler 2 puff, 2 puff, Inhalation, BID - RT, Haily Mcneil MD, 2 puff at 06/08/21 0746  •  cetirizine (zyrTEC) tablet 10 mg, 10 mg, Oral, Daily, Haily Mcneil MD, 10 mg at 06/07/21 0807  •  clopidogrel (PLAVIX) tablet 75 mg, 75 mg, Oral, Daily, Haily Mcneil MD, 75 mg at 06/07/21 0806  •  cyclobenzaprine (FLEXERIL) tablet 10 mg, 10 mg, Oral, BID PRN, Haliy Mcneil MD, 10 mg at 06/08/21 0404  •  dextrose (D50W) 25 g/ 50mL Intravenous Solution 25 g, 25 g, Intravenous, Q15 Min PRN, Haily Mcneil MD  •  dextrose (GLUTOSE) oral gel 15 g, 15 g, Oral, Q15 Min PRN, Haily Mcneil MD  •  DULoxetine (CYMBALTA) DR capsule 20 mg, 20 mg, Oral, Daily, Haily Mcneil MD, 20 mg at 06/07/21 0807  •  ferrous sulfate EC tablet 324 mg, 324 mg, Oral, Daily With Breakfast, Haily Mcneil,  MD, 324 mg at 06/07/21 0807  •  furosemide (LASIX) tablet 20 mg, 20 mg, Oral, Daily, Nirmal Calvillo MD, 20 mg at 06/07/21 0806  •  gabapentin (NEURONTIN) capsule 800 mg, 800 mg, Oral, TID, Froylan Lu MD, 800 mg at 06/07/21 2007  •  glucagon (human recombinant) (GLUCAGEN DIAGNOSTIC) injection 1 mg, 1 mg, Subcutaneous, Q15 Min PRN, Haily Mcneil MD  •  heparin (porcine) 5000 UNIT/ML injection 5,000 Units, 5,000 Units, Subcutaneous, Q8H, Haily Mcneil MD, 5,000 Units at 06/08/21 0550  •  hydrALAZINE (APRESOLINE) injection 10 mg, 10 mg, Intravenous, Q6H PRN, Nirmal Calvillo MD  •  insulin aspart (novoLOG) injection 0-14 Units, 0-14 Units, Subcutaneous, TID AC, Haily Mcneil MD, 8 Units at 06/07/21 1635  •  insulin detemir (LEVEMIR) injection 30 Units, 30 Units, Subcutaneous, Q12H, Nirmal Calvillo MD, 30 Units at 06/07/21 2140  •  ipratropium-albuterol (DUO-NEB) nebulizer solution 3 mL, 3 mL, Nebulization, 4x Daily - RT, Haily Mcneil MD, 3 mL at 06/08/21 0746  •  isosorbide mononitrate (IMDUR) 24 hr tablet 30 mg, 30 mg, Oral, Daily, Haily Mcneil MD, 30 mg at 06/07/21 0807  •  metoprolol tartrate (LOPRESSOR) tablet 100 mg, 100 mg, Oral, Q12H, Haily Mcneil MD, 100 mg at 06/07/21 0807  •  montelukast (SINGULAIR) tablet 10 mg, 10 mg, Oral, Nightly, Haily Mcneil MD, 10 mg at 06/07/21 2007  •  nystatin (MYCOSTATIN) powder, , Topical, TID, Haily Mcneil MD, Given at 06/07/21 0810  •  ondansetron (ZOFRAN) injection 4 mg, 4 mg, Intravenous, Q6H PRN, Haily Mcneil MD  •  oxybutynin XL (DITROPAN-XL) 24 hr tablet 5 mg, 5 mg, Oral, Daily, Haily Mcneil MD, 5 mg at 06/07/21 0807  •  pantoprazole (PROTONIX) EC tablet 40 mg, 40 mg, Oral, Nightly, Haily Mcneil MD, 40 mg at 06/07/21 2006  •  sodium bicarbonate tablet 650 mg, 650 mg, Oral, BID, Haily Mcneil MD, 650 mg at 06/07/21 2006  •  [COMPLETED] Insert peripheral IV, , , Once **AND** sodium chloride 0.9 % flush 10 mL, 10 mL, Intravenous, PRN,  Haily Mcneil MD  •  sodium chloride 0.9 % flush 10 mL, 10 mL, Intravenous, Q12H, Haily Mcneil MD, 10 mL at 06/07/21 2008  •  sodium chloride 0.9 % flush 10 mL, 10 mL, Intravenous, PRN, Haily Mcneil MD  •  sodium polystyrene (KAYEXALATE) 15 GM/60ML suspension 30 g, 30 g, Oral, Once, Nirmal Calvillo MD  •  sucralfate (CARAFATE) tablet 1 g, 1 g, Oral, 4x Daily, Haily Mcneil MD, Stopped at 06/03/21 1800     Diagnostic Data    Lab Results (last 24 hours)     Procedure Component Value Units Date/Time    Comprehensive Metabolic Panel [[id number]]  (Abnormal) Collected: 06/08/21 0522    Specimen: Blood Updated: 06/08/21 0637     Glucose 390 mg/dL      BUN 57 mg/dL      Creatinine 2.60 mg/dL      Sodium 136 mmol/L      Potassium 5.4 mmol/L      Chloride 104 mmol/L      CO2 24.0 mmol/L      Calcium 8.3 mg/dL      Total Protein 7.2 g/dL      Albumin 3.30 g/dL      ALT (SGPT) 9 U/L      AST (SGOT) 12 U/L      Alkaline Phosphatase 125 U/L      Total Bilirubin 0.2 mg/dL      eGFR Non African Amer 19 mL/min/1.73      Globulin 3.9 gm/dL      A/G Ratio 0.8 g/dL      BUN/Creatinine Ratio 21.9     Anion Gap 8.0 mmol/L     Narrative:      GFR Normal >60  Chronic Kidney Disease <60  Kidney Failure <15      POC Glucose Once [[id number]]  (Abnormal) Collected: 06/08/21 0615    Specimen: Blood Updated: 06/08/21 0628     Glucose 349 mg/dL      Comment: RN NotifiedOperator: [id number] [facility] ID: [id number]       CBC Auto Differential [[id number]]  (Abnormal) Collected: 06/08/21 0522    Specimen: Blood Updated: 06/08/21 0620     WBC 7.80 10*3/mm3      RBC 3.21 10*6/mm3      Hemoglobin 9.4 g/dL      Hematocrit 30.5 %      MCV 95.0 fL      MCH 29.3 pg      MCHC 30.8 g/dL      RDW 18.3 %      RDW-SD 62.5 fl      MPV 9.3 fL      Platelets 215 10*3/mm3      Neutrophil % 54.4 %      Lymphocyte % 32.4 %      Monocyte % 7.6 %      Eosinophil % 2.3 %      Basophil % 1.0 %      Immature Grans % 2.3 %      Neutrophils, Absolute  4.24 10*3/mm3      Lymphocytes, Absolute 2.53 10*3/mm3      Monocytes, Absolute 0.59 10*3/mm3      Eosinophils, Absolute 0.18 10*3/mm3      Basophils, Absolute 0.08 10*3/mm3      Immature Grans, Absolute 0.18 10*3/mm3      nRBC 0.0 /100 WBC     Blood Culture - Blood, Arm, Right [[id number]] Collected: 06/03/21 0558    Specimen: Blood from Arm, Right Updated: 06/08/21 0615     Blood Culture No growth at 5 days    POC Glucose Once [[id number]]  (Abnormal) Collected: 06/07/21 2027    Specimen: Blood Updated: 06/07/21 2114     Glucose 363 mg/dL      Comment: RN NotifiedOperator: [id number] [employer] ID: [id number]       Blood Culture - Blood, Arm, Left [[id number]] Collected: 06/02/21 1646    Specimen: Blood from Arm, Left Updated: 06/07/21 1700     Blood Culture No growth at 5 days    POC Glucose Once [[id number]]  (Abnormal) Collected: 06/07/21 1631    Specimen: Blood Updated: 06/07/21 1644     Glucose 294 mg/dL      Comment: RN NotifiedOperator: [id number] [place] [employer] ID: [id number]       POC Glucose Once [[id number]]  (Abnormal) Collected: 06/06/21 2021    Specimen: Blood Updated: 06/07/21 1539     Glucose 439 mg/dL      Comment: Result Not ConfirmedOperator: [id number] [employer] ID: [id number]       POC Glucose Once [[id number]]  (Abnormal) Collected: 06/06/21 2001    Specimen: Blood Updated: 06/07/21 1539     Glucose 450 mg/dL      Comment: RN NotifiedOperator: [id number] [employer] ID: [id number]       POC Glucose Once [[id number]]  (Abnormal) Collected: 06/05/21 2113    Specimen: Blood Updated: 06/07/21 1519     Glucose 404 mg/dL      Comment: Result Not ConfirmedOperator: [id number] [employer] [employer] ID: [id number]              Imaging Results (Last 24 Hours)     ** No results found for the last 24 hours. **          I reviewed the patient's new clinical results.    Assessment/Plan:     Active Hospital Problems    Diagnosis    • **Acute on chronic respiratory failure with  hypoxia and hypercapnia (CMS/HCC)      - Duo nebs and albuterol as needed  · Patient brought her own BiPAP to wear at night  · O2 maintain sats between 88-92%  · One time solumedrol in ER  · last echo in April 2021 showed EF 61.3% with moderate concentric hypertrophy. Repeat 6/5/21 EF>70%     • Acute pulmonary edema (CMS/HCC)      · CXR shows cardiomegaly and pulmonary infiltrates   · O2 nc at 5L with saturations 90% titrate to maintain saturations 88-92%  · Bumex 1mg bid will hold now  · Incentive spirometry  · Patient supplied BiPAP at night     • Hyperkalemia      · 6.1 in ER, EKG showed NSR with old RBBB  · Albuterol treatment  · Kayexalate   · Calcium gluconate 1g  · Consider insulin if not improving     • Morbid obesity (CMS/HCC)      · BMI 49.38  · Likely has obesity hypoventilation syndrome with poor respiratory effort  · Consistent carbohydrate/cardiac/renal diet     • Acute kidney injury superimposed on chronic kidney disease (CMS/HCC)      · Baseline creatinine 1.7 to 1.9 and GFR 25-30  · Hold nephrotoxic agents  · Trend creatinine  · IVF when fluid overload improves  · Continue bicarb tablets  · Continue Lisinopril 10mg       • Pneumonia of both lungs due to infectious organism      · Ceftriaxone started 6/2/21  · Atypicals negative   · Duo nebs and albuterol as needed  · Continue Symbicort  · ABG as needed  · CXR showed PNA vs congestion     • Acute renal failure superimposed on chronic kidney disease (CMS/HCC)      · Held Empagliflozin due to GFR < 30  · Bumex 1mg will hold due to increase in Cr 6/5/21  · Trend creatinine  · Hold nephrotoxic agents  · Renal diet     • Uncontrolled type 2 diabetes mellitus with complication, with long-term current use of insulin (CMS/[facility])      · Home regimen held  · SSI     • Chronic obstructive pulmonary disease (CMS/HCC)      · Symbicort  · Duo nebs and albuterol as needed  · Continue montelukast and cetirizine  · Incentive spirometry     • Mixed hyperlipidemia       · No lipid panel since 6/24/2020  · Repeat lipid panel   · Continue Atorvastatin 40mg     • Coronary artery disease      · Continue ASA 81mg, Atorvastatin 40mg, Clopidogrel 75mg, Imdur 30mg  · Last cath was 4/30/2021 showed multivessel dz with successful PCI of LM and proximal Cx with dug eluding stent, and PTCA of native ramus and PCI of proximal SVG to ramus with drug eluding stent.      • GERD (gastroesophageal reflux disease)      · Protonix 40mg IV     • Essential hypertension      · /89 in ER, prn hydralazine  · Continue Lisinopril 10mg, Metoprolol 100mg bid     • S/P CABG x 3      · Completed in 2013  · Continue ASA 81mg, Atorvastatin 40mg, Clopidogrel 75mg, Imdur 30mg, Lisinopril 10mg, Metoprolol 100mg bid     • PAD (peripheral artery disease) (CMS/[facility])      · Continue ASA 81mg, Clopidogrel 75mg, Atorvastatin 40mg           DVT prophylaxis: Heparin  Code Status and Medical Interventions:   Ordered at: 06/02/21 1824     Level Of Support Discussed With:    Patient     Code Status:    CPR     Medical Interventions (Level of Support Prior to Arrest):    Full       Plan for disposition:Where: current living arrangements and When:  1-2 days      Time: 15 min           This document has been electronically signed by Nirmal Calvillo MD on June 8, 2021 07:49 CDT

## 2021-06-08 NOTE — PROGRESS NOTES
Cardiac Rehab Evaluation: although she meets criteria for CR she is again hospitalized with HH after. Another consult when she is free of HH and physically able needs to be sent.

## 2021-06-08 NOTE — PLAN OF CARE
Goal Outcome Evaluation:           Progress: improving  Outcome Summary: Pt slept most of the day.  BS continue to be high.  No c/o pain or discomfort.  VSS.  Will continue to monitor.

## 2021-06-08 NOTE — PLAN OF CARE
Goal Outcome Evaluation:  Plan of Care Reviewed With: patient accepting of current condition. Encouraged to do as much ADLs for self. No acute events. Will continue to monitor.

## 2021-06-09 ENCOUNTER — READMISSION MANAGEMENT (OUTPATIENT)
Dept: CALL CENTER | Facility: HOSPITAL | Age: 61
End: 2021-06-09

## 2021-06-09 VITALS
TEMPERATURE: 96.2 F | HEIGHT: 62 IN | HEART RATE: 64 BPM | DIASTOLIC BLOOD PRESSURE: 61 MMHG | RESPIRATION RATE: 18 BRPM | BODY MASS INDEX: 53.92 KG/M2 | WEIGHT: 293 LBS | SYSTOLIC BLOOD PRESSURE: 132 MMHG | OXYGEN SATURATION: 96 %

## 2021-06-09 PROBLEM — J81.0 ACUTE PULMONARY EDEMA: Status: RESOLVED | Noted: 2021-06-02 | Resolved: 2021-06-09

## 2021-06-09 PROBLEM — N17.9 ACUTE KIDNEY INJURY SUPERIMPOSED ON CHRONIC KIDNEY DISEASE: Status: RESOLVED | Noted: 2020-10-05 | Resolved: 2021-06-09

## 2021-06-09 PROBLEM — J96.21 ACUTE ON CHRONIC RESPIRATORY FAILURE WITH HYPOXIA AND HYPERCAPNIA: Status: RESOLVED | Noted: 2021-06-02 | Resolved: 2021-06-09

## 2021-06-09 PROBLEM — N17.9 ACUTE RENAL FAILURE SUPERIMPOSED ON CHRONIC KIDNEY DISEASE: Status: RESOLVED | Noted: 2017-04-16 | Resolved: 2021-06-09

## 2021-06-09 PROBLEM — N18.9 ACUTE RENAL FAILURE SUPERIMPOSED ON CHRONIC KIDNEY DISEASE (HCC): Status: RESOLVED | Noted: 2017-04-16 | Resolved: 2021-06-09

## 2021-06-09 PROBLEM — J96.22 ACUTE ON CHRONIC RESPIRATORY FAILURE WITH HYPOXIA AND HYPERCAPNIA: Status: RESOLVED | Noted: 2021-06-02 | Resolved: 2021-06-09

## 2021-06-09 PROBLEM — N18.9 ACUTE KIDNEY INJURY SUPERIMPOSED ON CHRONIC KIDNEY DISEASE: Status: RESOLVED | Noted: 2020-10-05 | Resolved: 2021-06-09

## 2021-06-09 LAB
ALBUMIN SERPL-MCNC: 3.2 G/DL (ref 3.5–5.2)
ALBUMIN/GLOB SERPL: 1 G/DL
ALP SERPL-CCNC: 121 U/L (ref 39–117)
ALT SERPL W P-5'-P-CCNC: 8 U/L (ref 1–33)
ANION GAP SERPL CALCULATED.3IONS-SCNC: 7 MMOL/L (ref 5–15)
AST SERPL-CCNC: 10 U/L (ref 1–32)
BASOPHILS # BLD AUTO: 0.06 10*3/MM3 (ref 0–0.2)
BASOPHILS NFR BLD AUTO: 0.7 % (ref 0–1.5)
BILIRUB SERPL-MCNC: 0.2 MG/DL (ref 0–1.2)
BUN SERPL-MCNC: 50 MG/DL (ref 8–23)
BUN/CREAT SERPL: 22.8 (ref 7–25)
CALCIUM SPEC-SCNC: 8.3 MG/DL (ref 8.6–10.5)
CHLORIDE SERPL-SCNC: 103 MMOL/L (ref 98–107)
CO2 SERPL-SCNC: 30 MMOL/L (ref 22–29)
CREAT SERPL-MCNC: 2.19 MG/DL (ref 0.57–1)
DEPRECATED RDW RBC AUTO: 62.9 FL (ref 37–54)
EOSINOPHIL # BLD AUTO: 0.24 10*3/MM3 (ref 0–0.4)
EOSINOPHIL NFR BLD AUTO: 2.7 % (ref 0.3–6.2)
ERYTHROCYTE [DISTWIDTH] IN BLOOD BY AUTOMATED COUNT: 18.6 % (ref 12.3–15.4)
GFR SERPL CREATININE-BSD FRML MDRD: 23 ML/MIN/1.73
GLOBULIN UR ELPH-MCNC: 3.2 GM/DL
GLUCOSE BLDC GLUCOMTR-MCNC: 244 MG/DL (ref 70–130)
GLUCOSE BLDC GLUCOMTR-MCNC: 261 MG/DL (ref 70–130)
GLUCOSE BLDC GLUCOMTR-MCNC: 294 MG/DL (ref 70–130)
GLUCOSE SERPL-MCNC: 256 MG/DL (ref 65–99)
HCT VFR BLD AUTO: 30.1 % (ref 34–46.6)
HGB BLD-MCNC: 9.2 G/DL (ref 12–15.9)
IMM GRANULOCYTES # BLD AUTO: 0.05 10*3/MM3 (ref 0–0.05)
IMM GRANULOCYTES NFR BLD AUTO: 0.6 % (ref 0–0.5)
LYMPHOCYTES # BLD AUTO: 2.33 10*3/MM3 (ref 0.7–3.1)
LYMPHOCYTES NFR BLD AUTO: 26.6 % (ref 19.6–45.3)
MCH RBC QN AUTO: 28.6 PG (ref 26.6–33)
MCHC RBC AUTO-ENTMCNC: 30.6 G/DL (ref 31.5–35.7)
MCV RBC AUTO: 93.5 FL (ref 79–97)
MONOCYTES # BLD AUTO: 0.49 10*3/MM3 (ref 0.1–0.9)
MONOCYTES NFR BLD AUTO: 5.6 % (ref 5–12)
NEUTROPHILS NFR BLD AUTO: 5.59 10*3/MM3 (ref 1.7–7)
NEUTROPHILS NFR BLD AUTO: 63.8 % (ref 42.7–76)
NRBC BLD AUTO-RTO: 0 /100 WBC (ref 0–0.2)
PLATELET # BLD AUTO: 218 10*3/MM3 (ref 140–450)
PMV BLD AUTO: 9.2 FL (ref 6–12)
POTASSIUM SERPL-SCNC: 5.1 MMOL/L (ref 3.5–5.2)
PROT SERPL-MCNC: 6.4 G/DL (ref 6–8.5)
RBC # BLD AUTO: 3.22 10*6/MM3 (ref 3.77–5.28)
SODIUM SERPL-SCNC: 140 MMOL/L (ref 136–145)
WBC # BLD AUTO: 8.76 10*3/MM3 (ref 3.4–10.8)

## 2021-06-09 PROCEDURE — 94799 UNLISTED PULMONARY SVC/PX: CPT

## 2021-06-09 PROCEDURE — 99239 HOSP IP/OBS DSCHRG MGMT >30: CPT | Performed by: STUDENT IN AN ORGANIZED HEALTH CARE EDUCATION/TRAINING PROGRAM

## 2021-06-09 PROCEDURE — 25010000002 HEPARIN (PORCINE) PER 1000 UNITS: Performed by: STUDENT IN AN ORGANIZED HEALTH CARE EDUCATION/TRAINING PROGRAM

## 2021-06-09 PROCEDURE — 63710000001 INSULIN ASPART PER 5 UNITS: Performed by: STUDENT IN AN ORGANIZED HEALTH CARE EDUCATION/TRAINING PROGRAM

## 2021-06-09 PROCEDURE — 85025 COMPLETE CBC W/AUTO DIFF WBC: CPT | Performed by: STUDENT IN AN ORGANIZED HEALTH CARE EDUCATION/TRAINING PROGRAM

## 2021-06-09 PROCEDURE — 36415 COLL VENOUS BLD VENIPUNCTURE: CPT | Performed by: STUDENT IN AN ORGANIZED HEALTH CARE EDUCATION/TRAINING PROGRAM

## 2021-06-09 PROCEDURE — 94760 N-INVAS EAR/PLS OXIMETRY 1: CPT

## 2021-06-09 PROCEDURE — 80053 COMPREHEN METABOLIC PANEL: CPT | Performed by: STUDENT IN AN ORGANIZED HEALTH CARE EDUCATION/TRAINING PROGRAM

## 2021-06-09 PROCEDURE — 63710000001 INSULIN DETEMIR PER 5 UNITS: Performed by: STUDENT IN AN ORGANIZED HEALTH CARE EDUCATION/TRAINING PROGRAM

## 2021-06-09 PROCEDURE — 82962 GLUCOSE BLOOD TEST: CPT

## 2021-06-09 RX ORDER — FUROSEMIDE 20 MG/1
20 TABLET ORAL DAILY
Qty: 30 TABLET | Refills: 0 | Status: SHIPPED | OUTPATIENT
Start: 2021-06-10 | End: 2021-07-01 | Stop reason: HOSPADM

## 2021-06-09 RX ADMIN — HEPARIN SODIUM 5000 UNITS: 5000 INJECTION INTRAVENOUS; SUBCUTANEOUS at 05:40

## 2021-06-09 RX ADMIN — INSULIN DETEMIR 45 UNITS: 100 INJECTION, SOLUTION SUBCUTANEOUS at 09:49

## 2021-06-09 RX ADMIN — IPRATROPIUM BROMIDE AND ALBUTEROL SULFATE 3 ML: 2.5; .5 SOLUTION RESPIRATORY (INHALATION) at 07:00

## 2021-06-09 RX ADMIN — INSULIN ASPART 5 UNITS: 100 INJECTION, SOLUTION INTRAVENOUS; SUBCUTANEOUS at 08:09

## 2021-06-09 RX ADMIN — FERROUS SULFATE TAB EC 324 MG (65 MG FE EQUIVALENT) 324 MG: 324 (65 FE) TABLET DELAYED RESPONSE at 08:08

## 2021-06-09 RX ADMIN — BUDESONIDE AND FORMOTEROL FUMARATE DIHYDRATE 2 PUFF: 160; 4.5 AEROSOL RESPIRATORY (INHALATION) at 07:08

## 2021-06-09 RX ADMIN — SODIUM BICARBONATE 650 MG: 650 TABLET ORAL at 08:08

## 2021-06-09 RX ADMIN — METOPROLOL TARTRATE 100 MG: 50 TABLET, FILM COATED ORAL at 08:08

## 2021-06-09 RX ADMIN — CLOPIDOGREL BISULFATE 75 MG: 75 TABLET ORAL at 08:08

## 2021-06-09 RX ADMIN — FUROSEMIDE 20 MG: 20 TABLET ORAL at 08:08

## 2021-06-09 RX ADMIN — ISOSORBIDE MONONITRATE 30 MG: 30 TABLET, EXTENDED RELEASE ORAL at 08:08

## 2021-06-09 RX ADMIN — OXYBUTYNIN CHLORIDE 5 MG: 5 TABLET, EXTENDED RELEASE ORAL at 08:08

## 2021-06-09 RX ADMIN — INSULIN ASPART 8 UNITS: 100 INJECTION, SOLUTION INTRAVENOUS; SUBCUTANEOUS at 11:02

## 2021-06-09 RX ADMIN — NYSTATIN: 100000 POWDER TOPICAL at 08:11

## 2021-06-09 RX ADMIN — DULOXETINE HYDROCHLORIDE 20 MG: 20 CAPSULE, DELAYED RELEASE ORAL at 08:08

## 2021-06-09 RX ADMIN — SODIUM CHLORIDE, PRESERVATIVE FREE 10 ML: 5 INJECTION INTRAVENOUS at 08:09

## 2021-06-09 RX ADMIN — CETIRIZINE HYDROCHLORIDE 10 MG: 10 TABLET, FILM COATED ORAL at 08:08

## 2021-06-09 RX ADMIN — GABAPENTIN 800 MG: 400 CAPSULE ORAL at 08:08

## 2021-06-09 RX ADMIN — ASPIRIN 81 MG: 81 TABLET, FILM COATED ORAL at 08:08

## 2021-06-09 NOTE — NURSING NOTE
Spoke with resident (Ewa) concerning PT's CBG being 404 at bedtime. Scheduled Levemir to be given-pending pharmacy. Response from provider: administer scheduled 40 units and recheck at midnight-notify provider if still elevated.

## 2021-06-09 NOTE — CONSULTS
Nutrition Services    Patient Name:  Yamileth Slater  YOB: 1960  MRN: [id number]  Admit Date:  6/2/2021    When RD visited pt said that she was doing much better because she was going home.  She said that she knows what to do in regards to her diet.  She has received diet education in the past.  She follows a Low Sodium ADA/Renal diet.  She denies any questions.  RD encouraged her to continue with dietary compliance and to call RD with any questions.  She voiced understanding.      Electronically signed by:  Gissel Littlejohn RD  06/09/21 13:58 CDT

## 2021-06-09 NOTE — DISCHARGE SUMMARY
DISCHARGE SUMMARY    PATIENT NAME: Yamileth Slater       PHYSICIAN: Nirmal Calvillo MD  : 1960  MRN: [id number]    ADMITTED: 2021     DISCHARGED: 21     ADMISSION DIAGNOSES:  Active Hospital Problems    Diagnosis  POA   • Hyperkalemia [E87.5]  Yes   • Morbid obesity (CMS/[facility]) [E66.01]  Yes   • Uncontrolled type 2 diabetes mellitus with complication, with long-term current use of insulin (CMS/[facility]) [E11.8, E11.65, Z79.4]  Not Applicable   • Chronic obstructive pulmonary disease (CMS/HCC) [J44.9]  Yes   • Mixed hyperlipidemia [E78.2]  Yes   • Coronary artery disease [I25.10]  Yes   • GERD (gastroesophageal reflux disease) [K21.9]  Yes   • Essential hypertension [I10]  Yes   • S/P CABG x 3 [Z95.1]  Not Applicable   • PAD (peripheral artery disease) (CMS/[facility]) [I73.9]  Yes      Resolved Hospital Problems    Diagnosis Date Resolved POA   • **Acute on chronic respiratory failure with hypoxia and hypercapnia (CMS/[facility]) [J96.21, J96.22] 2021 Yes   • Acute pulmonary edema (CMS/[facility]) [J81.0] 2021 Yes   • Acute kidney injury superimposed on chronic kidney disease (CMS/HCC) [N17.9, N18.9] 2021 Yes   • Acute renal failure superimposed on chronic kidney disease (CMS/HCC) [N17.9, N18.9] 2021 Yes     DISCHARGE DIAGNOSES:   Active Hospital Problems    Diagnosis  POA   • Hyperkalemia [E87.5]  Yes   • Morbid obesity (CMS/[facility]) [E66.01]  Yes   • Uncontrolled type 2 diabetes mellitus with complication, with long-term current use of insulin (CMS/[facility]) [E11.8, E11.65, Z79.4]  Not Applicable   • Chronic obstructive pulmonary disease (CMS/HCC) [J44.9]  Yes   • Mixed hyperlipidemia [E78.2]  Yes   • Coronary artery disease [I25.10]  Yes   • GERD (gastroesophageal reflux disease) [K21.9]  Yes   • Essential hypertension [I10]  Yes   • S/P CABG x 3 [Z95.1]  Not Applicable   • PAD (peripheral artery disease) (CMS/HCC) [I73.9]  Yes      Resolved Hospital Problems    Diagnosis Date Resolved POA   •  "**Acute on chronic respiratory failure with hypoxia and hypercapnia (CMS/HCC) [J96.21, J96.22] 06/09/2021 Yes   • Acute pulmonary edema (CMS/HCC) [J81.0] 06/09/2021 Yes   • Acute kidney injury superimposed on chronic kidney disease (CMS/HCC) [N17.9, N18.9] 06/09/2021 Yes   • Acute renal failure superimposed on chronic kidney disease (CMS/HCC) [N17.9, N18.9] 06/09/2021 Yes       SERVICE: Family Medicine Residency  Attending: Pauline Martinez MD  Resident: Nirmal Calvillo MD    CONSULTS:   Consult Orders (all) (From admission, onward)     Start     Ordered    06/02/21 2325  Inpatient Case Management  Consult  Once     Provider:  (Not yet assigned)    06/02/21 2324                PROCEDURES:   None    HISTORY OF PRESENT ILLNESS:   HPI as per Dr. Mcneil's H&P on admission on 6/3/2021:    Yamileth Slater is a 61 y.o. female with a PMH of CABG X 3 in 2013, PVD, DM, Morbid obesity, HTN, HLD, COPD, Chronic respiratory failure, MRSA infection, PNA, CKD 3 and GERD who presents with worsening shortness of breath with decreased saturations at home. She states that she would have to use her BiPAP \"for 5 minutes\" to get enough air in just to do her ADLs. Her work of breathing increased as well as her oxygen usage at home and that made her scared. She denies fevers/chills, chest pain, abdominal pain, difficulty urinating or moving her bowels. She does notice her \"legs swell at night but by the morning it's gone\".      DIAGNOSTIC DATA:   Lab Results (last 24 hours)     Procedure Component Value Units Date/Time    POC Glucose Once [[id number]]  (Abnormal) Collected: 06/09/21 1017    Specimen: Blood Updated: 06/09/21 1037     Glucose 294 mg/dL      Comment: RN NotifiedOperator: [id number] DARIEN AMBERMeter ID: [id number]       POC Glucose Once [[id number]]  (Abnormal) Collected: 06/09/21 0608    Specimen: Blood Updated: 06/09/21 0621     Glucose 244 mg/dL      Comment: RN NotifiedOperator: [id number] JUDD" REFUGIOMeter ID: [id number]       Comprehensive Metabolic Panel [[id number]]  (Abnormal) Collected: 06/09/21 0528    Specimen: Blood Updated: 06/09/21 0620     Glucose 256 mg/dL      BUN 50 mg/dL      Creatinine 2.19 mg/dL      Sodium 140 mmol/L      Potassium 5.1 mmol/L      Chloride 103 mmol/L      CO2 30.0 mmol/L      Calcium 8.3 mg/dL      Total Protein 6.4 g/dL      Albumin 3.20 g/dL      ALT (SGPT) 8 U/L      AST (SGOT) 10 U/L      Alkaline Phosphatase 121 U/L      Total Bilirubin 0.2 mg/dL      eGFR Non African Amer 23 mL/min/1.73      Globulin 3.2 gm/dL      A/G Ratio 1.0 g/dL      BUN/Creatinine Ratio 22.8     Anion Gap 7.0 mmol/L     Narrative:      GFR Normal >60  Chronic Kidney Disease <60  Kidney Failure <15      CBC Auto Differential [[id number]]  (Abnormal) Collected: 06/09/21 0528    Specimen: Blood Updated: 06/09/21 0556     WBC 8.76 10*3/mm3      RBC 3.22 10*6/mm3      Hemoglobin 9.2 g/dL      Hematocrit 30.1 %      MCV 93.5 fL      MCH 28.6 pg      MCHC 30.6 g/dL      RDW 18.6 %      RDW-SD 62.9 fl      MPV 9.2 fL      Platelets 218 10*3/mm3      Neutrophil % 63.8 %      Lymphocyte % 26.6 %      Monocyte % 5.6 %      Eosinophil % 2.7 %      Basophil % 0.7 %      Immature Grans % 0.6 %      Neutrophils, Absolute 5.59 10*3/mm3      Lymphocytes, Absolute 2.33 10*3/mm3      Monocytes, Absolute 0.49 10*3/mm3      Eosinophils, Absolute 0.24 10*3/mm3      Basophils, Absolute 0.06 10*3/mm3      Immature Grans, Absolute 0.05 10*3/mm3      nRBC 0.0 /100 WBC     POC Glucose Once [[id number]]  (Abnormal) Collected: 06/09/21 0341    Specimen: Blood Updated: 06/09/21 0356     Glucose 261 mg/dL      Comment: : [id number] [place] CASEYMeter ID: [id number]       POC Glucose Once [[id number]]  (Abnormal) Collected: 06/08/21 1955    Specimen: Blood Updated: 06/08/21 2035     Glucose 404 mg/dL      Comment: RN NotifiedOperator: [id number] [facility] ID: [id number]       POC Glucose Once [[id number]]   (Abnormal) Collected: 06/08/21 1640    Specimen: Blood Updated: 06/08/21 1701     Glucose 221 mg/dL      Comment: RN NotifiedOperator: [id number] ISMAEL CRYSTALMeter ID: [id number]            Imaging Results (Most Recent)     Procedure Component Value Units Date/Time    XR Chest 1 View [[id number]] Collected: 06/02/21 1514     Updated: 06/02/21 1542    Narrative:      PROCEDURE: Single view AP upright portable chest x-ray at 2:47  PM.    INDICATION: Shortness of breath.    COMPARISON: 5/16/2021 CT angiogram of the chest. 5/13/2021 chest  x-ray and earlier chest exams.    FINDINGS:    Stable sternal sutures.    Cardiomegaly with moderate vascular congestion.    Bilateral pulmonary infiltrates increased on left and similar to  slightly worse on the right compared to 5/13/2021.  Findings may represent changes of pulmonary edema and/or  superimposed pneumonia.      Right CP angle. Left CP angle sharp.      Impression:      Cardiomegaly with moderate vascular congestion.  Bilateral pulmonary infiltrates increased on the left similar to  increased on the right differential includes pulmonary edema  and/or superimposed pneumonia.    Mild blunting right CP angle.    [id number]    Electronically signed by:  Nirmal Verma MD  6/2/2021 3:41 PM  CDT Workstation: [id number]         Results for orders placed during the hospital encounter of 06/02/21    Adult Transthoracic Echo Limited W/ Cont if Necessary Per Protocol    Interpretation Summary  · The study is technically difficult for diagnosis. The quality of the study is limited due to patient body habitus, patient positioning and lung disease.  · Left ventricular systolic function is hyperdynamic (EF > 70%).  · Left atrial volume is mildly increased.  · Left ventricular diastolic function was not assessed.        HOSPITAL COURSE:  Patient admitted for acute on chronic respiratory failure with hypoxia and hypercapnia in addition to hyperkalemia, and ERIKA. She was placed on  supplemental oxygen increased from home requirements, given kayexalate and provided IV fluids while holding nephrotoxic medications. Decision was made to diurese patient with lasix despite ERIKA and patients breathing improved returning her to her baseline home oxygen of 3L. Compliant with trilogy. She will be continued on lasix as outpatient to prevent rehospitalization. Echo was redone due to possible worsening of cardiac function after stent placement in [place] in 4/2021 and cardiac function was improved. In addition, creatinine improved to 2.19 despite the use of lasix nearing baseline of 1.8. Hyperkalemia resolved with kayexalate and discontinuation of potassium supplements. She was active, tolerating PO diet, and respiratory status at baseline on 3L NC so was deemed safe for discharge with PCP follow up.     Insulin regimen adjusted but can get different results as outpatient due to unhealthy diet.    DISCHARGE CONDITION:   Stable    DISPOSITION:  Home or Self Care    DISCHARGE MEDICATIONS     Discharge Medications      New Medications      Instructions Start Date   furosemide 20 MG tablet  Commonly known as: LASIX   20 mg, Oral, Daily   Start Date: Maida 10, 2021     insulin detemir 100 UNIT/ML injection  Commonly known as: LEVEMIR   45 Units, Subcutaneous, Every 12 Hours Scheduled         Changes to Medications      Instructions Start Date   atorvastatin 40 MG tablet  Commonly known as: LIPITOR  What changed: when to take this   40 mg, Oral, Daily      metoprolol tartrate 100 MG tablet  Commonly known as: LOPRESSOR  What changed:   · when to take this  · Another medication with the same name was removed. Continue taking this medication, and follow the directions you see here.   100 mg, Oral, Every 12 Hours Scheduled         Continue These Medications      Instructions Start Date   Adult Aspirin Regimen 81 MG EC tablet  Generic drug: aspirin   81 mg, Oral, Daily      albuterol sulfate  (90 Base)  MCG/ACT inhaler  Commonly known as: PROVENTIL HFA;VENTOLIN HFA;PROAIR HFA   2 puffs, Inhalation, Every 4 Hours PRN      budesonide-formoterol 160-4.5 MCG/ACT inhaler  Commonly known as: SYMBICORT   2 puffs, Inhalation, 2 Times Daily - RT      cetirizine 10 MG tablet  Commonly known as: zyrTEC   10 mg, Oral, Daily      clopidogrel 75 MG tablet  Commonly known as: PLAVIX   75 mg, Oral, Daily      [employer] Blood Glucose Meter w/Device kit   1 each, Does not apply, 3 Times Daily      cyclobenzaprine 10 MG tablet  Commonly known as: FLEXERIL   10 mg, Oral, 2 Times Daily PRN      DULoxetine 20 MG capsule  Commonly known as: Cymbalta   20 mg, Oral, Daily      Easy Touch Pen Needles 31G X 8 MM misc  Generic drug: Insulin Pen Needle   No dose, route, or frequency recorded.      NovoFine 30G X 8 MM misc  Generic drug: Insulin Pen Needle   As directed 4 times daily      ferrous sulfate 325 (65 FE) MG tablet  Commonly known as: FeroSul   325 mg, Oral, Daily With Breakfast      FreeStyle Jade 2 [place] device   1 each, Does not apply, Continuous      FreeStyle Jade 2 Sensor misc   1 each, Does not apply, Every 14 Days      gabapentin 800 MG tablet  Commonly known as: NEURONTIN   800 mg, Oral, 3 Times Daily      glucose blood test strip   Use as instructed      ipratropium-albuterol 0.5-2.5 mg/3 ml nebulizer  Commonly known as: DUO-NEB   3 mL, Nebulization, 4 Times Daily - RT      isosorbide mononitrate 30 MG 24 hr tablet  Commonly known as: IMDUR   30 mg, Oral, Daily      lidocaine 5 %  Commonly known as: LIDODERM   PLACE 1 PATCH ON THE SKIN AS DIRECTED BY PROVIDER DAILY. REMOVE & DISCARD PATCH WITHIN 12 HOURS OR AS DIRECTED BY MD      lisinopril 10 MG tablet  Commonly known as: PRINIVIL,ZESTRIL   10 mg, Oral, Daily      montelukast 10 MG tablet  Commonly known as: SINGULAIR   10 mg, Oral, Nightly      nitroglycerin 0.4 MG SL tablet  Commonly known as: NITROSTAT   0.4 mg, Sublingual, Every 5 Minutes PRN      NovoLOG FlexPen 100  UNIT/ML solution pen-injector sc pen  Generic drug: insulin aspart   20 Units, Subcutaneous, 3 Times Daily With Meals      nystatin [id number] UNIT/GM powder  Commonly known as: MYCOSTATIN   Topical, 3 Times Daily      ondansetron ODT 4 MG disintegrating tablet  Commonly known as: Zofran ODT   4 mg, Translingual, Every 8 Hours PRN      oxybutynin XL 5 MG 24 hr tablet  Commonly known as: DITROPAN-XL   5 mg, Oral, Daily      pantoprazole 40 MG EC tablet  Commonly known as: PROTONIX   40 mg, Oral, Nightly      sodium bicarbonate 650 MG tablet   650 mg, Oral, 2 Times Daily      sucralfate 1 g tablet  Commonly known as: CARAFATE   1 g, Oral, 4 Times Daily         Stop These Medications    Jardiance 25 MG tablet  Generic drug: Empagliflozin     Lantus SoloStar 100 UNIT/ML injection pen  Generic drug: Insulin Glargine     naproxen 250 MG tablet  Commonly known as: NAPROSYN     potassium chloride 10 MEQ CR capsule  Commonly known as: MICRO-K            INSTRUCTIONS:  Activity:   Activity Instructions     Activity as Tolerated          Diet:   Diet Instructions     Diet: Consistent Carbohydrate, Regular, Cardiac, Renal      Discharge Diet:  Consistent Carbohydrate  Regular  Cardiac  Renal             FOLLOW UP:   Follow-up Information     Nirmal Calvillo MD Follow up in 1 week(s).    Specialties: Family Medicine, Emergency Medicine  Contact information:  [address] [place], [place] [id number] [phone]                   PENDING TEST RESULTS AT DISCHARGE      Time: >30 minutes were spent in discharge planning, medication reconciliation and coordination of care for this patient.    Pauline Martinez MD is the attending at time of discharge, She is aware of the patient's status and agrees with the above discharge summary.          This document has been electronically signed by Nirmal Calvillo MD on June 9, 2021 11:37 CDT

## 2021-06-09 NOTE — PROGRESS NOTES
FAMILY MEDICINE DAILY PROGRESS NOTE  NAME: Yamileth Slater  : 1960  MRN: [id number]     LOS: 7 days     PROVIDER OF SERVICE: Nirmal Calvillo MD    Chief Complaint: Acute on chronic respiratory failure with hypoxia and hypercapnia (CMS/HCC)    Subjective:     Interval History:  History taken from: patient chart  VSS with no acute events overnight. Chest pain has resolved. Cr improving. Glucose improved.    Review of Systems:   Review of Systems   Constitutional: Negative for activity change and appetite change.   HENT: Negative for congestion and trouble swallowing.    Respiratory: Negative for chest tightness and shortness of breath.    Cardiovascular: Negative for chest pain and palpitations.   Gastrointestinal: Negative for abdominal distention and abdominal pain.   Genitourinary: Negative for difficulty urinating and dysuria.   Musculoskeletal: Negative for arthralgias and myalgias.   Skin: Negative for color change and pallor.   Neurological: Negative for dizziness, light-headedness and headaches.   Psychiatric/Behavioral: Negative for agitation and behavioral problems.       Objective:     Vital Signs  Temp:  [96.2 °F (35.7 °C)-97.9 °F (36.6 °C)] 96.2 °F (35.7 °C)  Heart Rate:  [53-73] 63  Resp:  [16-20] 18  BP: (132-160)/(58-70) 132/61    Physical Exam  Physical Exam  Constitutional:       General: She is not in acute distress.     Appearance: She is well-developed.   HENT:      Head: Normocephalic and atraumatic.      Right Ear: External ear normal.      Left Ear: External ear normal.      Nose: Nose normal.   Eyes:      Extraocular Movements: Extraocular movements intact.      Pupils: Pupils are equal, round, and reactive to light.   Cardiovascular:      Rate and Rhythm: Normal rate and regular rhythm.      Heart sounds: Normal heart sounds. No murmur heard.   No friction rub. No gallop.    Pulmonary:      Effort: Pulmonary effort is normal. No respiratory distress.      Breath sounds:  Normal breath sounds. No wheezing or rales.   Abdominal:      General: Bowel sounds are normal. There is no distension.      Palpations: Abdomen is soft.      Tenderness: There is no abdominal tenderness.   Musculoskeletal:         General: Normal range of motion.      Cervical back: Normal range of motion and neck supple.      Right lower leg: No edema.      Left lower leg: No edema.   Skin:     General: Skin is warm.      Findings: No erythema.   Neurological:      Mental Status: She is alert and oriented to person, place, and time. Mental status is at baseline.   Psychiatric:         Mood and Affect: Mood normal.         Behavior: Behavior normal.         Medication Review    Current Facility-Administered Medications:   •  aspirin EC tablet 81 mg, 81 mg, Oral, Daily, Haily Mcneil MD, 81 mg at 06/08/21 0804  •  atorvastatin (LIPITOR) tablet 40 mg, 40 mg, Oral, Nightly, Haily Mcneil MD, 40 mg at 06/08/21 2125  •  budesonide-formoterol (SYMBICORT) 160-4.5 MCG/ACT inhaler 2 puff, 2 puff, Inhalation, BID - RT, Haily Mcneil MD, 2 puff at 06/09/21 0708  •  cetirizine (zyrTEC) tablet 10 mg, 10 mg, Oral, Daily, Haily Mcneil MD, 10 mg at 06/08/21 0803  •  clopidogrel (PLAVIX) tablet 75 mg, 75 mg, Oral, Daily, Haily Mcneil MD, 75 mg at 06/08/21 0802  •  cyclobenzaprine (FLEXERIL) tablet 10 mg, 10 mg, Oral, BID PRN, Haily Mcneil MD, 10 mg at 06/08/21 2124  •  dextrose (D50W) 25 g/ 50mL Intravenous Solution 25 g, 25 g, Intravenous, Q15 Min PRN, Haily Mcneil MD  •  dextrose (GLUTOSE) oral gel 15 g, 15 g, Oral, Q15 Min PRN, Haily Mcneil MD  •  DULoxetine (CYMBALTA) DR capsule 20 mg, 20 mg, Oral, Daily, Haily Mcneil MD, 20 mg at 06/08/21 0804  •  ferrous sulfate EC tablet 324 mg, 324 mg, Oral, Daily With Breakfast, Haily Mcneil MD, 324 mg at 06/08/21 0801  •  furosemide (LASIX) tablet 20 mg, 20 mg, Oral, Daily, Nirmal Calvillo MD, 20 mg at 06/08/21 0803  •  gabapentin (NEURONTIN) capsule 800 mg, 800 mg,  Oral, TID, Froylan Lu MD, 800 mg at 06/08/21 2125  •  glucagon (human recombinant) (GLUCAGEN DIAGNOSTIC) injection 1 mg, 1 mg, Subcutaneous, Q15 Min PRN, Haily Mcneil MD  •  heparin (porcine) 5000 UNIT/ML injection 5,000 Units, 5,000 Units, Subcutaneous, Q8H, Haily Mcneil MD, 5,000 Units at 06/09/21 0540  •  hydrALAZINE (APRESOLINE) injection 10 mg, 10 mg, Intravenous, Q6H PRN, Nirmal Calvillo MD  •  insulin aspart (novoLOG) injection 0-14 Units, 0-14 Units, Subcutaneous, TID AC, Haily Mcneil MD, 5 Units at 06/08/21 1648  •  insulin detemir (LEVEMIR) injection 45 Units, 45 Units, Subcutaneous, Q12H, Nirmal Calvillo MD  •  ipratropium-albuterol (DUO-NEB) nebulizer solution 3 mL, 3 mL, Nebulization, 4x Daily - RT, Haily Mcneil MD, 3 mL at 06/09/21 0700  •  isosorbide mononitrate (IMDUR) 24 hr tablet 30 mg, 30 mg, Oral, Daily, Haily Mcneil MD, 30 mg at 06/08/21 0803  •  metoprolol tartrate (LOPRESSOR) tablet 100 mg, 100 mg, Oral, Q12H, Haily Mcneil MD, 100 mg at 06/08/21 2125  •  montelukast (SINGULAIR) tablet 10 mg, 10 mg, Oral, Nightly, Haily Mcneil MD, 10 mg at 06/08/21 2125  •  nystatin (MYCOSTATIN) powder, , Topical, TID, Haily Mcneil MD, Given at 06/08/21 1649  •  ondansetron (ZOFRAN) injection 4 mg, 4 mg, Intravenous, Q6H PRN, Haily Mcneil MD  •  oxybutynin XL (DITROPAN-XL) 24 hr tablet 5 mg, 5 mg, Oral, Daily, Haily Mcneil MD, 5 mg at 06/08/21 0802  •  pantoprazole (PROTONIX) EC tablet 40 mg, 40 mg, Oral, Nightly, Haily Mcneil MD, 40 mg at 06/08/21 2125  •  sodium bicarbonate tablet 650 mg, 650 mg, Oral, BID, Haily Mcneil MD, 650 mg at 06/08/21 2125  •  [COMPLETED] Insert peripheral IV, , , Once **AND** sodium chloride 0.9 % flush 10 mL, 10 mL, Intravenous, PRN, Haily Mcneil MD  •  sodium chloride 0.9 % flush 10 mL, 10 mL, Intravenous, Q12H, Haily Mcneil MD, 10 mL at 06/08/21 2125  •  sodium chloride 0.9 % flush 10 mL, 10 mL, Intravenous, PRN, Haily Mcneil MD  •   sucralfate (CARAFATE) tablet 1 g, 1 g, Oral, 4x Daily, Haily Mcneil MD, Stopped at 06/03/21 1800     Diagnostic Data    Lab Results (last 24 hours)     Procedure Component Value Units Date/Time    POC Glucose Once [[id number]]  (Abnormal) Collected: 06/09/21 0608    Specimen: Blood Updated: 06/09/21 0621     Glucose 244 mg/dL      Comment: RN NotifiedOperator: [id number] JUDD Abdullahi ID: [id number]       Comprehensive Metabolic Panel [[id number]]  (Abnormal) Collected: 06/09/21 0528    Specimen: Blood Updated: 06/09/21 0620     Glucose 256 mg/dL      BUN 50 mg/dL      Creatinine 2.19 mg/dL      Sodium 140 mmol/L      Potassium 5.1 mmol/L      Chloride 103 mmol/L      CO2 30.0 mmol/L      Calcium 8.3 mg/dL      Total Protein 6.4 g/dL      Albumin 3.20 g/dL      ALT (SGPT) 8 U/L      AST (SGOT) 10 U/L      Alkaline Phosphatase 121 U/L      Total Bilirubin 0.2 mg/dL      eGFR Non African Amer 23 mL/min/1.73      Globulin 3.2 gm/dL      A/G Ratio 1.0 g/dL      BUN/Creatinine Ratio 22.8     Anion Gap 7.0 mmol/L     Narrative:      GFR Normal >60  Chronic Kidney Disease <60  Kidney Failure <15      CBC Auto Differential [[id number]]  (Abnormal) Collected: 06/09/21 0528    Specimen: Blood Updated: 06/09/21 0556     WBC 8.76 10*3/mm3      RBC 3.22 10*6/mm3      Hemoglobin 9.2 g/dL      Hematocrit 30.1 %      MCV 93.5 fL      MCH 28.6 pg      MCHC 30.6 g/dL      RDW 18.6 %      RDW-SD 62.9 fl      MPV 9.2 fL      Platelets 218 10*3/mm3      Neutrophil % 63.8 %      Lymphocyte % 26.6 %      Monocyte % 5.6 %      Eosinophil % 2.7 %      Basophil % 0.7 %      Immature Grans % 0.6 %      Neutrophils, Absolute 5.59 10*3/mm3      Lymphocytes, Absolute 2.33 10*3/mm3      Monocytes, Absolute 0.49 10*3/mm3      Eosinophils, Absolute 0.24 10*3/mm3      Basophils, Absolute 0.06 10*3/mm3      Immature Grans, Absolute 0.05 10*3/mm3      nRBC 0.0 /100 WBC     POC Glucose Once [[id number]]  (Abnormal) Collected: 06/09/21 4371     Specimen: Blood Updated: 06/09/21 0356     Glucose 261 mg/dL      Comment: : [id number] FLORES CASEYMeter ID: [id number]       POC Glucose Once [[id number]]  (Abnormal) Collected: 06/08/21 1955    Specimen: Blood Updated: 06/08/21 2035     Glucose 404 mg/dL      Comment: RN NotifiedOperator: [id number] JUDD MORGANMeter ID: [id number]       POC Glucose Once [[id number]]  (Abnormal) Collected: 06/08/21 1640    Specimen: Blood Updated: 06/08/21 1701     Glucose 221 mg/dL      Comment: RN NotifiedOperator: [id number] GUEVARA CRYSTALMeter ID: [id number]              Imaging Results (Last 24 Hours)     ** No results found for the last 24 hours. **          I reviewed the patient's new clinical results.    Assessment/Plan:     Active Hospital Problems    Diagnosis    • **Acute on chronic respiratory failure with hypoxia and hypercapnia (CMS/HCC)      - Duo nebs and albuterol as needed  · Patient brought her own BiPAP to wear at night  · O2 maintain sats between 88-92%  · One time solumedrol in ER  · last echo in April 2021 showed EF 61.3% with moderate concentric hypertrophy. Repeat 6/5/21 EF>70%     • Acute pulmonary edema (CMS/HCC)      · CXR shows cardiomegaly and pulmonary infiltrates   · O2 nc at 5L with saturations 90% titrate to maintain saturations 88-92%  · Bumex 1mg bid will hold now  · Incentive spirometry  · Patient supplied BiPAP at night     • Hyperkalemia      · 6.1 in ER, EKG showed NSR with old RBBB  · Albuterol treatment  · Kayexalate   · Calcium gluconate 1g  · Consider insulin if not improving     • Morbid obesity (CMS/HCC)      · BMI 49.38  · Likely has obesity hypoventilation syndrome with poor respiratory effort  · Consistent carbohydrate/cardiac/renal diet     • Acute kidney injury superimposed on chronic kidney disease (CMS/HCC)      · Baseline creatinine 1.7 to 1.9 and GFR 25-30  · Hold nephrotoxic agents.  Improving on Lasix 20mg daily  · Trend creatinine  · IVF when fluid overload  improves  · Continue bicarb tablets  · Continue Lisinopril 10mg       • Acute renal failure superimposed on chronic kidney disease (CMS/[facility])      · Held Empagliflozin due to GFR < 30  · Bumex 1mg will hold due to increase in Cr 6/5/21. Improving on Lasix 20mg daily  · Trend creatinine  · Hold nephrotoxic agents  · Renal diet     • Uncontrolled type 2 diabetes mellitus with complication, with long-term current use of insulin (CMS/[facility])      · Home regimen held  · SSI     • Chronic obstructive pulmonary disease (CMS/[facility])      · Symbicort  · Duo nebs and albuterol as needed  · Continue montelukast and cetirizine  · Incentive spirometry     • Mixed hyperlipidemia      · No lipid panel since 6/24/2020  · Repeat lipid panel   · Continue Atorvastatin 40mg     • Coronary artery disease      · Continue ASA 81mg, Atorvastatin 40mg, Clopidogrel 75mg, Imdur 30mg  · Last cath was 4/30/2021 showed multivessel dz with successful PCI of LM and proximal Cx with dug eluding stent, and PTCA of native ramus and PCI of proximal SVG to ramus with drug eluding stent.      • GERD (gastroesophageal reflux disease)      · Protonix 40mg IV     • Essential hypertension      · /89 in ER, prn hydralazine  · Continue Lisinopril 10mg, Metoprolol 100mg bid     • S/P CABG x 3      · Completed in 2013  · Continue ASA 81mg, Atorvastatin 40mg, Clopidogrel 75mg, Imdur 30mg, Lisinopril 10mg, Metoprolol 100mg bid     • PAD (peripheral artery disease) (CMS/[facility])      · Continue ASA 81mg, Clopidogrel 75mg, Atorvastatin 40mg           DVT prophylaxis: Heparin  Code Status and Medical Interventions:   Ordered at: 06/02/21 1824     Level Of Support Discussed With:    Patient     Code Status:    CPR     Medical Interventions (Level of Support Prior to Arrest):    Full       Plan for disposition:Where: current living arrangements and When:  today tomorrow      Time: 15 min           This document has been electronically signed by Nirmal Calvillo MD on  June 9, 2021 07:45 CDT

## 2021-06-10 ENCOUNTER — TRANSITIONAL CARE MANAGEMENT TELEPHONE ENCOUNTER (OUTPATIENT)
Dept: CALL CENTER | Facility: HOSPITAL | Age: 61
End: 2021-06-10

## 2021-06-10 ENCOUNTER — TELEPHONE (OUTPATIENT)
Dept: FAMILY MEDICINE CLINIC | Facility: CLINIC | Age: 61
End: 2021-06-10

## 2021-06-10 LAB
QT INTERVAL: 454 MS
QTC INTERVAL: 479 MS

## 2021-06-10 NOTE — OUTREACH NOTE
Prep Survey      Responses   [facility] facility patient discharged from?  [place]   Is LACE score < 7 ?  No   Emergency Room discharge w/ pulse ox?  No   Eligibility  [facility]   Date of Admission  06/02/21   Date of Discharge  06/09/21   Discharge Disposition  Home or Self Care   Discharge diagnosis  a/c Resp failure   Does the patient have one of the following disease processes/diagnoses(primary or secondary)?  Other   Does the patient have Home health ordered?  Yes   What is the Home health agency?   [facility]   Is there a DME ordered?  No   Prep survey completed?  Yes          Marybel Hsu RN

## 2021-06-10 NOTE — PAYOR COMM NOTE
"Caty Kennedy  [facility]  P: [phone]  F: [phone]    [facility]#[id number]    Yamileth Slater (61 y.o. 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                 Active Insurance as of 6/2/2021     Primary Coverage     Payor Plan Insurance Group Employer/Plan Group    HUMANA MEDICARE REPLACEMENT HUMANA MEDICARE REPLACEMENT [id number]     Payor Plan Address Payor Plan Phone Number Payor Plan Fax Number Effective Dates    [address] [address] [phone]  7/1/2020 - None Entered    [facility] [address]       Subscriber Name Subscriber Birth Date Member ID       YAMILETH SLATER 1960 [id number]           Secondary Coverage     Payor Plan Insurance Group Employer/Plan Group    [place]      Payor Plan Address Payor Plan Phone Number Payor Plan Fax Number Effective Dates    [address] [phone]  6/28/2019 - None Entered    [facility] [address]       Subscriber Name Subscriber Birth Date Member ID       YAMILETH SLATER 1960 [id number]                 Emergency Contacts      (Rel.) Home Phone Work Phone Mobile Phone    Huy Slater (Spouse) [phone] -- " [phone]    Yamileth Chavez (Daughter) [phone] -- [phone]    Suzanne Rivera (Daughter) [phone] -- [phone]               Discharge Summary      Nirmal Calvillo MD at 21 1117              DISCHARGE SUMMARY    PATIENT NAME: Yamileth Slater       PHYSICIAN: Nirmal Calvillo MD  : 1960  MRN: [id number]    ADMITTED: 2021     DISCHARGED: 21     ADMISSION DIAGNOSES:  Active Hospital Problems    Diagnosis  POA   • Hyperkalemia [E87.5]  Yes   • Morbid obesity (CMS/HCC) [E66.01]  Yes   • Uncontrolled type 2 diabetes mellitus with complication, with long-term current use of insulin (CMS/HCC) [E11.8, E11.65, Z79.4]  Not Applicable   • Chronic obstructive pulmonary disease (CMS/HCC) [J44.9]  Yes   • Mixed hyperlipidemia [E78.2]  Yes   • Coronary artery disease [I25.10]  Yes   • GERD (gastroesophageal reflux disease) [K21.9]  Yes   • Essential hypertension [I10]  Yes   • S/P CABG x 3 [Z95.1]  Not Applicable   • PAD (peripheral artery disease) (CMS/HCC) [I73.9]  Yes      Resolved Hospital Problems    Diagnosis Date Resolved POA   • **Acute on chronic respiratory failure with hypoxia and hypercapnia (CMS/HCC) [J96.21, J96.22] 2021 Yes   • Acute pulmonary edema (CMS/HCC) [J81.0] 2021 Yes   • Acute kidney injury superimposed on chronic kidney disease (CMS/HCC) [N17.9, N18.9] 2021 Yes   • Acute renal failure superimposed on chronic kidney disease (CMS/HCC) [N17.9, N18.9] 2021 Yes     DISCHARGE DIAGNOSES:   Active Hospital Problems    Diagnosis  POA   • Hyperkalemia [E87.5]  Yes   • Morbid obesity (CMS/HCC) [E66.01]  Yes   • Uncontrolled type 2 diabetes mellitus with complication, with long-term current use of insulin (CMS/HCC) [E11.8, E11.65, Z79.4]  Not Applicable   • Chronic obstructive pulmonary disease (CMS/HCC) [J44.9]  Yes   • Mixed hyperlipidemia [E78.2]  Yes   • Coronary artery disease [I25.10]  Yes   • GERD (gastroesophageal reflux disease) [K21.9]   "Yes   • Essential hypertension [I10]  Yes   • S/P CABG x 3 [Z95.1]  Not Applicable   • PAD (peripheral artery disease) (CMS/[facility]) [I73.9]  Yes      Resolved Hospital Problems    Diagnosis Date Resolved POA   • **Acute on chronic respiratory failure with hypoxia and hypercapnia (CMS/[facility]) [J96.21, J96.22] 06/09/2021 Yes   • Acute pulmonary edema (CMS/[facility]) [J81.0] 06/09/2021 Yes   • Acute kidney injury superimposed on chronic kidney disease (CMS/[facility]) [N17.9, N18.9] 06/09/2021 Yes   • Acute renal failure superimposed on chronic kidney disease (CMS/[facility]) [N17.9, N18.9] 06/09/2021 Yes       SERVICE: Family Medicine Residency  Attending: Pauline Martinez MD  Resident: Nirmal Calvillo MD    CONSULTS:   Consult Orders (all) (From admission, onward)     Start     Ordered    06/02/21 2325  Inpatient Case Management  Consult  Once     Provider:  (Not yet assigned)    06/02/21 2324                PROCEDURES:   None    HISTORY OF PRESENT ILLNESS:   HPI as per Dr. Mcneil's H&P on admission on 6/3/2021:    Yamileth Slater is a 61 y.o. female with a PMH of CABG X 3 in 2013, PVD, DM, Morbid obesity, HTN, HLD, COPD, Chronic respiratory failure, MRSA infection, PNA, CKD 3 and GERD who presents with worsening shortness of breath with decreased saturations at home. She states that she would have to use her BiPAP \"for 5 minutes\" to get enough air in just to do her ADLs. Her work of breathing increased as well as her oxygen usage at home and that made her scared. She denies fevers/chills, chest pain, abdominal pain, difficulty urinating or moving her bowels. She does notice her \"legs swell at night but by the morning it's gone\".      DIAGNOSTIC DATA:   Lab Results (last 24 hours)     Procedure Component Value Units Date/Time    POC Glucose Once [[id number]]  (Abnormal) Collected: 06/09/21 1017    Specimen: Blood Updated: 06/09/21 1037     Glucose 294 mg/dL      Comment: RN NotifiedOperator: [id number] DARIEN AMBERMetjose " ID: [id number]       POC Glucose Once [[id number]]  (Abnormal) Collected: 06/09/21 0608    Specimen: Blood Updated: 06/09/21 0621     Glucose 244 mg/dL      Comment: RN NotifiedOperator: [id number] JUDD Abdullahi ID: [id number]       Comprehensive Metabolic Panel [[id number]]  (Abnormal) Collected: 06/09/21 0528    Specimen: Blood Updated: 06/09/21 0620     Glucose 256 mg/dL      BUN 50 mg/dL      Creatinine 2.19 mg/dL      Sodium 140 mmol/L      Potassium 5.1 mmol/L      Chloride 103 mmol/L      CO2 30.0 mmol/L      Calcium 8.3 mg/dL      Total Protein 6.4 g/dL      Albumin 3.20 g/dL      ALT (SGPT) 8 U/L      AST (SGOT) 10 U/L      Alkaline Phosphatase 121 U/L      Total Bilirubin 0.2 mg/dL      eGFR Non African Amer 23 mL/min/1.73      Globulin 3.2 gm/dL      A/G Ratio 1.0 g/dL      BUN/Creatinine Ratio 22.8     Anion Gap 7.0 mmol/L     Narrative:      GFR Normal >60  Chronic Kidney Disease <60  Kidney Failure <15      CBC Auto Differential [[id number]]  (Abnormal) Collected: 06/09/21 0528    Specimen: Blood Updated: 06/09/21 0556     WBC 8.76 10*3/mm3      RBC 3.22 10*6/mm3      Hemoglobin 9.2 g/dL      Hematocrit 30.1 %      MCV 93.5 fL      MCH 28.6 pg      MCHC 30.6 g/dL      RDW 18.6 %      RDW-SD 62.9 fl      MPV 9.2 fL      Platelets 218 10*3/mm3      Neutrophil % 63.8 %      Lymphocyte % 26.6 %      Monocyte % 5.6 %      Eosinophil % 2.7 %      Basophil % 0.7 %      Immature Grans % 0.6 %      Neutrophils, Absolute 5.59 10*3/mm3      Lymphocytes, Absolute 2.33 10*3/mm3      Monocytes, Absolute 0.49 10*3/mm3      Eosinophils, Absolute 0.24 10*3/mm3      Basophils, Absolute 0.06 10*3/mm3      Immature Grans, Absolute 0.05 10*3/mm3      nRBC 0.0 /100 WBC     POC Glucose Once [[id number]]  (Abnormal) Collected: 06/09/21 0341    Specimen: Blood Updated: 06/09/21 0356     Glucose 261 mg/dL      Comment: : [id number] FLORES CASEYMeter ID: [id number]       POC Glucose Once [[id number]]  (Abnormal)  Collected: 06/08/21 1955    Specimen: Blood Updated: 06/08/21 2035     Glucose 404 mg/dL      Comment: RN NotifiedOperator: [id number] JUDD HUFFMeter ID: [id number]       POC Glucose Once [[id number]]  (Abnormal) Collected: 06/08/21 1640    Specimen: Blood Updated: 06/08/21 1701     Glucose 221 mg/dL      Comment: RN NotifiedOperator: [id number] ISMAEL CRYSTALMeter ID: [id number]            Imaging Results (Most Recent)     Procedure Component Value Units Date/Time    XR Chest 1 View [[id number]] Collected: 06/02/21 1514     Updated: 06/02/21 1542    Narrative:      PROCEDURE: Single view AP upright portable chest x-ray at 2:47  PM.    INDICATION: Shortness of breath.    COMPARISON: 5/16/2021 CT angiogram of the chest. 5/13/2021 chest  x-ray and earlier chest exams.    FINDINGS:    Stable sternal sutures.    Cardiomegaly with moderate vascular congestion.    Bilateral pulmonary infiltrates increased on left and similar to  slightly worse on the right compared to 5/13/2021.  Findings may represent changes of pulmonary edema and/or  superimposed pneumonia.      Right CP angle. Left CP angle sharp.      Impression:      Cardiomegaly with moderate vascular congestion.  Bilateral pulmonary infiltrates increased on the left similar to  increased on the right differential includes pulmonary edema  and/or superimposed pneumonia.    Mild blunting right CP angle.    [id number]    Electronically signed by:  Nirmal Verma MD  6/2/2021 3:41 PM  CDT Workstation: [id number]         Results for orders placed during the hospital encounter of 06/02/21    Adult Transthoracic Echo Limited W/ Cont if Necessary Per Protocol    Interpretation Summary  · The study is technically difficult for diagnosis. The quality of the study is limited due to patient body habitus, patient positioning and lung disease.  · Left ventricular systolic function is hyperdynamic (EF > 70%).  · Left atrial volume is mildly increased.  · Left ventricular  diastolic function was not assessed.        HOSPITAL COURSE:  Patient admitted for acute on chronic respiratory failure with hypoxia and hypercapnia in addition to hyperkalemia, and ERIKA. She was placed on supplemental oxygen increased from home requirements, given kayexalate and provided IV fluids while holding nephrotoxic medications. Decision was made to diurese patient with lasix despite ERIKA and patients breathing improved returning her to her baseline home oxygen of 3L. Compliant with trilogy. She will be continued on lasix as outpatient to prevent rehospitalization. Echo was redone due to possible worsening of cardiac function after stent placement in [place] in 4/2021 and cardiac function was improved. In addition, creatinine improved to 2.19 despite the use of lasix nearing baseline of 1.8. Hyperkalemia resolved with kayexalate and discontinuation of potassium supplements. She was active, tolerating PO diet, and respiratory status at baseline on 3L NC so was deemed safe for discharge with PCP follow up.     Insulin regimen adjusted but can get different results as outpatient due to unhealthy diet.    DISCHARGE CONDITION:   Stable    DISPOSITION:  Home or Self Care    DISCHARGE MEDICATIONS     Discharge Medications      New Medications      Instructions Start Date   furosemide 20 MG tablet  Commonly known as: LASIX   20 mg, Oral, Daily   Start Date: Maida 10, 2021     insulin detemir 100 UNIT/ML injection  Commonly known as: LEVEMIR   45 Units, Subcutaneous, Every 12 Hours Scheduled         Changes to Medications      Instructions Start Date   atorvastatin 40 MG tablet  Commonly known as: LIPITOR  What changed: when to take this   40 mg, Oral, Daily      metoprolol tartrate 100 MG tablet  Commonly known as: LOPRESSOR  What changed:   · when to take this  · Another medication with the same name was removed. Continue taking this medication, and follow the directions you see here.   100 mg, Oral, Every 12 Hours  Scheduled         Continue These Medications      Instructions Start Date   Adult Aspirin Regimen 81 MG EC tablet  Generic drug: aspirin   81 mg, Oral, Daily      albuterol sulfate  (90 Base) MCG/ACT inhaler  Commonly known as: PROVENTIL HFA;VENTOLIN HFA;PROAIR HFA   2 puffs, Inhalation, Every 4 Hours PRN      budesonide-formoterol 160-4.5 MCG/ACT inhaler  Commonly known as: SYMBICORT   2 puffs, Inhalation, 2 Times Daily - RT      cetirizine 10 MG tablet  Commonly known as: zyrTEC   10 mg, Oral, Daily      clopidogrel 75 MG tablet  Commonly known as: PLAVIX   75 mg, Oral, Daily      [employer] Blood Glucose Meter w/Device kit   1 each, Does not apply, 3 Times Daily      cyclobenzaprine 10 MG tablet  Commonly known as: FLEXERIL   10 mg, Oral, 2 Times Daily PRN      DULoxetine 20 MG capsule  Commonly known as: Cymbalta   20 mg, Oral, Daily      Easy Touch Pen Needles 31G X 8 MM misc  Generic drug: Insulin Pen Needle   No dose, route, or frequency recorded.      NovoFine 30G X 8 MM misc  Generic drug: Insulin Pen Needle   As directed 4 times daily      ferrous sulfate 325 (65 FE) MG tablet  Commonly known as: FeroSul   325 mg, Oral, Daily With Breakfast      FreeStyle Jade 2 Rolette device   1 each, Does not apply, Continuous      FreeStyle Jade 2 Sensor misc   1 each, Does not apply, Every 14 Days      gabapentin 800 MG tablet  Commonly known as: NEURONTIN   800 mg, Oral, 3 Times Daily      glucose blood test strip   Use as instructed      ipratropium-albuterol 0.5-2.5 mg/3 ml nebulizer  Commonly known as: DUO-NEB   3 mL, Nebulization, 4 Times Daily - RT      isosorbide mononitrate 30 MG 24 hr tablet  Commonly known as: IMDUR   30 mg, Oral, Daily      lidocaine 5 %  Commonly known as: LIDODERM   PLACE 1 PATCH ON THE SKIN AS DIRECTED BY PROVIDER DAILY. REMOVE & DISCARD PATCH WITHIN 12 HOURS OR AS DIRECTED BY MD      lisinopril 10 MG tablet  Commonly known as: PRINIVIL,ZESTRIL   10 mg, Oral, Daily      montelukast 10  MG tablet  Commonly known as: SINGULAIR   10 mg, Oral, Nightly      nitroglycerin 0.4 MG SL tablet  Commonly known as: NITROSTAT   0.4 mg, Sublingual, Every 5 Minutes PRN      NovoLOG FlexPen 100 UNIT/ML solution pen-injector sc pen  Generic drug: insulin aspart   20 Units, Subcutaneous, 3 Times Daily With Meals      nystatin 218777 UNIT/GM powder  Commonly known as: MYCOSTATIN   Topical, 3 Times Daily      ondansetron ODT 4 MG disintegrating tablet  Commonly known as: Zofran ODT   4 mg, Translingual, Every 8 Hours PRN      oxybutynin XL 5 MG 24 hr tablet  Commonly known as: DITROPAN-XL   5 mg, Oral, Daily      pantoprazole 40 MG EC tablet  Commonly known as: PROTONIX   40 mg, Oral, Nightly      sodium bicarbonate 650 MG tablet   650 mg, Oral, 2 Times Daily      sucralfate 1 g tablet  Commonly known as: CARAFATE   1 g, Oral, 4 Times Daily         Stop These Medications    Jardiance 25 MG tablet  Generic drug: Empagliflozin     Lantus SoloStar 100 UNIT/ML injection pen  Generic drug: Insulin Glargine     naproxen 250 MG tablet  Commonly known as: NAPROSYN     potassium chloride 10 MEQ CR capsule  Commonly known as: MICRO-K            INSTRUCTIONS:  Activity:   Activity Instructions     Activity as Tolerated          Diet:   Diet Instructions     Diet: Consistent Carbohydrate, Regular, Cardiac, Renal      Discharge Diet:  Consistent Carbohydrate  Regular  Cardiac  Renal             FOLLOW UP:   Follow-up Information     Nirmal Calvillo MD Follow up in 1 week(s).    Specialties: Family Medicine, Emergency Medicine  Contact information:  [address] [place] [id number] [phone]                   PENDING TEST RESULTS AT DISCHARGE      Time: >30 minutes were spent in discharge planning, medication reconciliation and coordination of care for this patient.    Pauline Martinez MD is the attending at time of discharge, She is aware of the patient's status and agrees with the above discharge summary.          This  document has been electronically signed by Nirmal Calvillo MD on June 9, 2021 11:37 CDT       Electronically signed by Nirmal Calvillo MD at 06/09/21 1137       Discharge Order (From admission, onward)     Start     Ordered    06/09/21 1110  Discharge patient  Once     Expected Discharge Date: 06/09/21    Discharge Disposition: Home or Self Care    Physician of Record for Attribution - Please select from Treatment Team: TASHI STERN [[id number]]    Review needed by CMO to determine Physician of Record: No       Question Answer Comment   Physician of Record for Attribution - Please select from Treatment Team TASHI STERN    Review needed by CMO to determine Physician of Record No        06/09/21 1116

## 2021-06-10 NOTE — TELEPHONE ENCOUNTER
ALEK FROM [facility] CALLED AND WANTED TO LET DR LOPEZ KNOW THAT SHE GOT DISCHARGED FROM THE HOSPITAL ON 06/09/2021 AND SHE WAS GOING TO GO TO THE HOME TODAY AND DIDN'T WANT THEM TO COME OUT UNTIL Monday. IF QUESTIONS HER NUMBER TO CALL BACK [phone].          THANK YOU,        RAINER

## 2021-06-10 NOTE — OUTREACH NOTE
Call Center TCM Note      Responses   [facility] patient discharged from?  [place]   Does the patient have one of the following disease processes/diagnoses(primary or secondary)?  Other   TCM attempt successful?  No   Unsuccessful attempts  Attempt 2          Noah Green RN    6/10/2021, 17:04 EDT

## 2021-06-10 NOTE — OUTREACH NOTE
Call Center TCM Note      Responses   [facility] patient discharged from?  [place]   Does the patient have one of the following disease processes/diagnoses(primary or secondary)?  Other   TCM attempt successful?  No   Unsuccessful attempts  Attempt 1          Noah Green RN    6/10/2021, 16:59 EDT

## 2021-06-11 ENCOUNTER — HOME CARE VISIT (OUTPATIENT)
Dept: HOME HEALTH SERVICES | Facility: CLINIC | Age: 61
End: 2021-06-11

## 2021-06-11 ENCOUNTER — TRANSITIONAL CARE MANAGEMENT TELEPHONE ENCOUNTER (OUTPATIENT)
Dept: CALL CENTER | Facility: HOSPITAL | Age: 61
End: 2021-06-11

## 2021-06-11 NOTE — OUTREACH NOTE
Call Center TCM Note      Responses   [facility] patient discharged from?  [place]   Does the patient have one of the following disease processes/diagnoses(primary or secondary)?  Other   TCM attempt successful?  No   Unsuccessful attempts  Attempt 3   Does the patient have a primary care provider?   Yes   Does the patient have an appointment with their PCP within 7 days of discharge?  Yes   Comments regarding PCP  hospital [place] fu appt on 6/15/21 at 2:45 pm with Mone Us MD   Has the patient kept scheduled appointments due by today?  N/A          Marielena Amezcua RN    6/11/2021, 11:48 EDT

## 2021-06-12 VITALS
HEART RATE: 74 BPM | DIASTOLIC BLOOD PRESSURE: 64 MMHG | SYSTOLIC BLOOD PRESSURE: 128 MMHG | RESPIRATION RATE: 20 BRPM | OXYGEN SATURATION: 95 % | TEMPERATURE: 97.3 F

## 2021-06-14 ENCOUNTER — HOME CARE VISIT (OUTPATIENT)
Dept: HOME HEALTH SERVICES | Facility: CLINIC | Age: 61
End: 2021-06-14

## 2021-06-14 VITALS — DIASTOLIC BLOOD PRESSURE: 84 MMHG | OXYGEN SATURATION: 97 % | HEART RATE: 82 BPM | SYSTOLIC BLOOD PRESSURE: 156 MMHG

## 2021-06-14 PROCEDURE — G0152 HHCP-SERV OF OT,EA 15 MIN: HCPCS

## 2021-06-14 PROCEDURE — G0151 HHCP-SERV OF PT,EA 15 MIN: HCPCS

## 2021-06-14 NOTE — HOME HEALTH
REASON FOR REFERRAL:  60 y/o female with several recent hospitalizations.1st: [facility] 4/23/21-5/3/21 after she presented with unstable angina to [facility] 4/23/21 and was transferred to [facility] 4/23/21.  She underwent PCI with stenting x3 4/30/21 by Dr. Voss.  She then was admitted to [facility] 5/13/21-5/21/21 with complaints of shortness of breath and was treated for GI bleed-transfused 2 units of PRBCs, acute respiratory failure with hypoxia and pneumonia and discharge home new on Bi-Pap and new on O2.  She then was rehospitalized at [facility] 6/2/21-6/9/21 for hyerkalemia, acute pulmonary edema, ERIKA, acute on chronic respiratory failure with hypoxia and hypercapnia.  Patient reported that she was treated also for pneumonia and CHF.  Patient stated that she is doing well and feels better than she has in quite some time.     PMH: GERD, CAD, CKD, HTN, PVD, DM, COPD, anxiety, neuropathy, morbid obesity, sleep apnea, vitamin D deficiency, sustance abuse, L carotid stenting, CABG, chronic low back pain,  s/p PCI with stenting x3 4/30/21.    MD APPTS: 6/15/21 Dr Us, 6/16/21 Dr Carias    PATIENT'S GOAL: N/A    EDEMA:  Noted edema R UE/hand:  Patient instructed to elevated R UE and complete AROM exercises to assist with edema management.  Patient verbalized understanding of completed education.    DME: Rollator walker, grab bars x2 walk-in-shower with shower curtain with shower chair, handicapped height toilet, SW, oxygen equipment, BiPap.    AROM B UEs: WFL and STRENGTH: 4/5 grossly throughout.    PLOF: Independent with ADLs except spouse would assist with washing feet/hard to reach areas, independent with functional transfers/functional mobility using rollator walker outside.

## 2021-06-15 ENCOUNTER — HOME CARE VISIT (OUTPATIENT)
Dept: HOME HEALTH SERVICES | Facility: CLINIC | Age: 61
End: 2021-06-15

## 2021-06-15 VITALS
HEART RATE: 56 BPM | TEMPERATURE: 97.8 F | SYSTOLIC BLOOD PRESSURE: 158 MMHG | RESPIRATION RATE: 18 BRPM | OXYGEN SATURATION: 97 % | DIASTOLIC BLOOD PRESSURE: 88 MMHG

## 2021-06-15 VITALS
RESPIRATION RATE: 18 BRPM | SYSTOLIC BLOOD PRESSURE: 138 MMHG | OXYGEN SATURATION: 96 % | DIASTOLIC BLOOD PRESSURE: 80 MMHG | HEART RATE: 74 BPM | TEMPERATURE: 97.9 F

## 2021-06-15 PROCEDURE — G0300 HHS/HOSPICE OF LPN EA 15 MIN: HCPCS

## 2021-06-15 PROCEDURE — G0299 HHS/HOSPICE OF RN EA 15 MIN: HCPCS

## 2021-06-15 NOTE — HOME HEALTH
REASON FOR REFERRAL: PATIENT WAS HOSPITALIZED SECONDARY TO PNEUMONIA BILATERAL LUNGS/CHF EXACERBATION, RESPIRATORY FAILURE. PATIENT WAS HOSPITALIZED FOR 7 DAYS. SHE HAD NOT BEEN FEELING WELL AND WAS HAVING INCREASED SHORTNESS OF AIR PRIOR TO HOSPITALIZATION AND WAS FEELING VERY POOR WHEN SHE WENT INTO THE HOSPITAL.    PMHX: COPD, CHF, MI,

## 2021-06-16 ENCOUNTER — OFFICE VISIT (OUTPATIENT)
Dept: FAMILY MEDICINE CLINIC | Facility: CLINIC | Age: 61
End: 2021-06-16

## 2021-06-16 ENCOUNTER — READMISSION MANAGEMENT (OUTPATIENT)
Dept: CALL CENTER | Facility: HOSPITAL | Age: 61
End: 2021-06-16

## 2021-06-16 ENCOUNTER — LAB (OUTPATIENT)
Dept: LAB | Facility: HOSPITAL | Age: 61
End: 2021-06-16

## 2021-06-16 VITALS
TEMPERATURE: 97.1 F | WEIGHT: 293 LBS | BODY MASS INDEX: 53.92 KG/M2 | HEART RATE: 69 BPM | OXYGEN SATURATION: 98 % | SYSTOLIC BLOOD PRESSURE: 120 MMHG | DIASTOLIC BLOOD PRESSURE: 60 MMHG | HEIGHT: 62 IN

## 2021-06-16 DIAGNOSIS — I25.110 CORONARY ARTERY DISEASE INVOLVING NATIVE CORONARY ARTERY OF NATIVE HEART WITH UNSTABLE ANGINA PECTORIS (HCC): ICD-10-CM

## 2021-06-16 DIAGNOSIS — R79.89 ELEVATED SERUM CREATININE: ICD-10-CM

## 2021-06-16 DIAGNOSIS — Z09 HOSPITAL DISCHARGE FOLLOW-UP: Primary | ICD-10-CM

## 2021-06-16 DIAGNOSIS — J96.21 ACUTE ON CHRONIC RESPIRATORY FAILURE WITH HYPOXIA AND HYPERCAPNIA (HCC): ICD-10-CM

## 2021-06-16 DIAGNOSIS — J42 CHRONIC BRONCHITIS, UNSPECIFIED CHRONIC BRONCHITIS TYPE (HCC): ICD-10-CM

## 2021-06-16 DIAGNOSIS — Z12.31 ENCOUNTER FOR SCREENING MAMMOGRAM FOR MALIGNANT NEOPLASM OF BREAST: ICD-10-CM

## 2021-06-16 DIAGNOSIS — J96.22 ACUTE ON CHRONIC RESPIRATORY FAILURE WITH HYPOXIA AND HYPERCAPNIA (HCC): ICD-10-CM

## 2021-06-16 PROCEDURE — 80053 COMPREHEN METABOLIC PANEL: CPT

## 2021-06-16 PROCEDURE — 36415 COLL VENOUS BLD VENIPUNCTURE: CPT

## 2021-06-16 PROCEDURE — 99213 OFFICE O/P EST LOW 20 MIN: CPT | Performed by: STUDENT IN AN ORGANIZED HEALTH CARE EDUCATION/TRAINING PROGRAM

## 2021-06-16 NOTE — OUTREACH NOTE
Medical Week 2 Survey      Responses   [facility] patient discharged from?  [place]   Does the patient have one of the following disease processes/diagnoses(primary or secondary)?  Other   Week 2 attempt successful?  No   Unsuccessful attempts  Attempt 1          Neelam Macias RN

## 2021-06-16 NOTE — PROGRESS NOTES
Family Medicine Residency  Nirmal Calvillo MD    Subjective:     Yamileth Slater is a 61 y.o. female who presents for hospital discharge follow-up.  She was admitted from 6/2/2021 until 6/9/2021 for acute on chronic respiratory failure with hypoxia and hypercapnia which resolved with Lasix.  Echo showed an EF greater than 70% but continued Lasix due to improvement in symptoms.  She has an ERIKA which improved.  She states her breathing has been stable since leaving the hospital, and only becomes short of breath with exertion when she goes to the bathroom which can be secondary to removing her oxygen.  She denies any cough or fever.  She continues to be on 3 L nasal cannula.  We are monitoring diabetes.  Jardiance was discontinued due to renal function.  She was continued on Levemir 45 units twice daily, and 40 units of short acting insulin 3 times daily.  However she states that her sugar continues to stay at or above 300.  Has CGM supplies but did not bring.    The following portions of the patient's history were reviewed and updated as appropriate: allergies, current medications, past family history, past medical history, past social history, past surgical history and problem list.    Past Medical Hx:  Past Medical History:   Diagnosis Date   • Anxiety    • Carotid artery stenosis    • Chronic obstructive lung disease (CMS/HCC)    • CKD (chronic kidney disease) stage 4, GFR 15-29 ml/min (CMS/HCC)    • Colonic polyp    • Coronary arteriosclerosis    • Diabetes mellitus (CMS/HCC)    • Diabetic neuropathy (CMS/HCC)    • GERD (gastroesophageal reflux disease)    • History of transfusion    • Hypercholesterolemia    • Hypertension    • Morbid obesity (CMS/HCC)    • Nephrolithiasis    • Peripheral vascular disease (CMS/HCC)    • Sleep apnea    • Substance abuse (CMS/HCC)    • Vitamin D deficiency        Past Surgical Hx:  Past Surgical History:   Procedure Laterality Date   • CARDIAC CATHETERIZATION N/A  7/14/2020   • CARDIAC CATHETERIZATION N/A 4/23/2021    Procedure: Left Heart Cath;  Surgeon: Melba Romo MD;  Location: [facility] CATH INVASIVE LOCATION;  Service: Cardiology;  Laterality: N/A;   • CARDIAC CATHETERIZATION N/A 4/30/2021    Procedure: Percutaneous Coronary Intervention;  Surgeon: Russell Voss MD;  Location: [facility] CATH INVASIVE LOCATION;  Service: Cardiovascular;  Laterality: N/A;   • CARDIAC CATHETERIZATION N/A 4/30/2021    Procedure: Stent NIKKI coronary;  Surgeon: Russell Voss MD;  Location: [facility] CATH INVASIVE LOCATION;  Service: Cardiovascular;  Laterality: N/A;   • CAROTID STENT Left    • COLONOSCOPY     • COLONOSCOPY N/A 5/14/2021    Procedure: COLONOSCOPY;  Surgeon: Mingo Duarte MD;  Location: [facility] ENDOSCOPY;  Service: Gastroenterology;  Laterality: N/A;   • CORONARY ARTERY BYPASS GRAFT N/A 2013    CABG X 3   • CYSTOSCOPY BLADDER STONE LITHOTRIPSY Bilateral    • ENDOSCOPY N/A 4/12/2021    Procedure: ESOPHAGOGASTRODUODENOSCOPY;  Surgeon: Mingo Duarte MD;  Location: [facility] ENDOSCOPY;  Service: Gastroenterology;  Laterality: N/A;   • ENDOSCOPY N/A 5/14/2021    Procedure: ESOPHAGOGASTRODUODENOSCOPY;  Surgeon: Mingo Duarte MD;  Location: [facility] ENDOSCOPY;  Service: Gastroenterology;  Laterality: N/A;       Current Meds:    Current Outpatient Medications:   •  albuterol sulfate  (90 Base) MCG/ACT inhaler, Inhale 2 puffs Every 4 (Four) Hours As Needed for Wheezing., Disp: 18 g, Rfl: 1  •  aspirin (aspirin) 81 MG EC tablet, Take 1 tablet by mouth Daily., Disp: 60 tablet, Rfl: 5  •  atorvastatin (LIPITOR) 40 MG tablet, Take 1 tablet by mouth Daily. (Patient taking differently: Take 40 mg by mouth Every Night.), Disp: 90 tablet, Rfl: 0  •  Blood Glucose Monitoring Suppl ([employer] Blood Glucose Meter) w/Device kit, 1 each 3 (Three) Times a Day., Disp: 1 kit, Rfl: 3  •  budesonide-formoterol (SYMBICORT) 160-4.5 MCG/ACT inhaler, Inhale 2 puffs 2 (Two) Times a  Day., Disp: , Rfl:   •  cetirizine (zyrTEC) 10 MG tablet, Take 1 tablet by mouth Daily., Disp: 30 tablet, Rfl: 3  •  clopidogrel (PLAVIX) 75 MG tablet, Take 1 tablet by mouth Daily., Disp: 30 tablet, Rfl: 5  •  Continuous Blood Gluc  (FreeStyle Jade 2 Advance) device, 1 each Continuous., Disp: 1 each, Rfl: 0  •  Continuous Blood Gluc Sensor (FreeStyle Jade 2 Sensor) misc, 1 each Every 14 (Fourteen) Days., Disp: 2 each, Rfl: 11  •  cyclobenzaprine (FLEXERIL) 10 MG tablet, Take 1 tablet by mouth 2 (Two) Times a Day As Needed for Muscle Spasms., Disp: 60 tablet, Rfl: 5  •  DULoxetine (Cymbalta) 20 MG capsule, Take 1 capsule by mouth Daily., Disp: 30 capsule, Rfl: 0  •  EASY TOUCH PEN NEEDLES 31G X 8 MM misc, , Disp: , Rfl:   •  ferrous sulfate (FeroSul) 325 (65 FE) MG tablet, Take 1 tablet by mouth Daily With Breakfast., Disp: 30 tablet, Rfl: 3  •  furosemide (LASIX) 20 MG tablet, Take 1 tablet by mouth Daily., Disp: 30 tablet, Rfl: 0  •  gabapentin (NEURONTIN) 800 MG tablet, Take 1 tablet by mouth 3 (Three) Times a Day., Disp: 90 tablet, Rfl: 0  •  glucose blood test strip, Use as instructed, Disp: 100 each, Rfl: 12  •  insulin aspart (novoLOG FLEXPEN) 100 UNIT/ML solution pen-injector sc pen, Inject 20 Units under the skin into the appropriate area as directed 3 (Three) Times a Day With Meals., Disp: 15 mL, Rfl: 1  •  insulin detemir (LEVEMIR) 100 UNIT/ML injection, Inject 45 Units under the skin into the appropriate area as directed Every 12 (Twelve) Hours for 30 days., Disp: 27 mL, Rfl: 11  •  Insulin Pen Needle (NovoFine) 30G X 8 MM misc, As directed 4 times daily, Disp: 100 each, Rfl: 11  •  ipratropium-albuterol (DUO-NEB) 0.5-2.5 mg/3 ml nebulizer, Take 3 mL by nebulization 4 (Four) Times a Day., Disp: , Rfl:   •  isosorbide mononitrate (IMDUR) 30 MG 24 hr tablet, Take 1 tablet by mouth Daily., Disp: 90 tablet, Rfl: 2  •  lidocaine (LIDODERM) 5 %, PLACE 1 PATCH ON THE SKIN AS DIRECTED BY PROVIDER DAILY.  REMOVE & DISCARD PATCH WITHIN 12 HOURS OR AS DIRECTED BY MD, Disp: 30 patch, Rfl: 3  •  lisinopril (PRINIVIL,ZESTRIL) 10 MG tablet, Take 1 tablet by mouth Daily., Disp: 30 tablet, Rfl: 5  •  montelukast (SINGULAIR) 10 MG tablet, Take 1 tablet by mouth Every Night., Disp: 30 tablet, Rfl: 5  •  nitroglycerin (NITROSTAT) 0.4 MG SL tablet, Place 0.4 mg under the tongue Every 5 (Five) Minutes As Needed for Chest Pain (x 3 doses)., Disp: , Rfl:   •  nystatin (MYCOSTATIN) 236209 UNIT/GM powder, Apply  topically to the appropriate area as directed 3 (Three) Times a Day., Disp: 15 g, Rfl: 1  •  ondansetron ODT (Zofran ODT) 4 MG disintegrating tablet, Place 1 tablet on the tongue Every 8 (Eight) Hours As Needed for Nausea or Vomiting., Disp: 30 tablet, Rfl: 0  •  oxybutynin XL (DITROPAN-XL) 5 MG 24 hr tablet, Take 1 tablet by mouth Daily., Disp: 90 tablet, Rfl: 1  •  pantoprazole (PROTONIX) 40 MG EC tablet, Take 1 tablet by mouth Every Night., Disp: 30 tablet, Rfl: 5  •  sodium bicarbonate 650 MG tablet, Take 1 tablet by mouth 2 (Two) Times a Day., Disp: 60 tablet, Rfl: 1  •  sucralfate (CARAFATE) 1 g tablet, Take 1 tablet by mouth 4 (Four) Times a Day., Disp: 120 tablet, Rfl: 3  •  metoprolol tartrate (LOPRESSOR) 100 MG tablet, Take 1 tablet by mouth Every 12 (Twelve) Hours for 30 days. (Patient taking differently: Take 100 mg by mouth 2 (Two) Times a Day.), Disp: 60 tablet, Rfl: 0    Allergies:  Allergies   Allergen Reactions   • Adhesive Tape Hives   • Other      Bandaids, MRSA, SURECLOSE       Family Hx:  Family History   Problem Relation Age of Onset   • Heart disease Mother    • Heart disease Father    • Heart disease Sister    • Heart disease Brother         Social History:  Social History     Socioeconomic History   • Marital status:      Spouse name: wesley dumont   • Number of children: Not on file   • Years of education: Not on file   • Highest education level: Not on file   Tobacco Use   • Smoking status:  "Light Tobacco Smoker     Packs/day: 0.25     Years: 46.00     Pack years: 11.50     Types: Cigarettes   • Smokeless tobacco: Never Used   • Tobacco comment: only smoking 5 a day    Substance and Sexual Activity   • Alcohol use: No   • Drug use: Not Currently     Types: LSD, Marijuana, Methamphetamines   • Sexual activity: Not Currently       Review of Systems  Review of Systems   Constitutional: Negative for activity change and appetite change.   HENT: Negative for congestion and trouble swallowing.    Respiratory: Negative for chest tightness and shortness of breath.    Cardiovascular: Negative for chest pain and palpitations.   Gastrointestinal: Negative for abdominal distention and abdominal pain.   Genitourinary: Negative for difficulty urinating and dysuria.   Musculoskeletal: Negative for arthralgias and myalgias.   Skin: Negative for color change and pallor.   Neurological: Negative for dizziness, light-headedness and headaches.   Psychiatric/Behavioral: Negative for agitation and behavioral problems.       Objective:     /60   Pulse 69   Temp 97.1 °F (36.2 °C)   Ht 157.5 cm (62\")   Wt (!) 140 kg (309 lb)   LMP  (LMP Unknown)   SpO2 98% Comment: o2 level 3  BMI 56.52 kg/m²   Physical Exam  Constitutional:       General: She is not in acute distress.     Appearance: She is well-developed.   HENT:      Head: Normocephalic and atraumatic.      Right Ear: External ear normal.      Left Ear: External ear normal.      Nose: Nose normal.   Eyes:      Extraocular Movements: Extraocular movements intact.      Pupils: Pupils are equal, round, and reactive to light.   Cardiovascular:      Rate and Rhythm: Normal rate and regular rhythm.      Heart sounds: Normal heart sounds. No murmur heard.   No friction rub. No gallop.    Pulmonary:      Effort: Pulmonary effort is normal. No respiratory distress.      Breath sounds: Normal breath sounds. No wheezing or rales.   Abdominal:      General: Bowel sounds are " normal. There is no distension.      Palpations: Abdomen is soft.      Tenderness: There is no abdominal tenderness.   Musculoskeletal:         General: Normal range of motion.      Cervical back: Normal range of motion and neck supple.      Right lower leg: No edema.      Left lower leg: No edema.   Skin:     General: Skin is warm.      Findings: No erythema.   Neurological:      Mental Status: She is alert and oriented to person, place, and time. Mental status is at baseline.   Psychiatric:         Mood and Affect: Mood normal.         Behavior: Behavior normal.          Assessment/Plan:     Diagnoses and all orders for this visit:    1. Hospital discharge follow-up (Primary)    2. Elevated serum creatinine  -     Comprehensive metabolic panel; Future    3. Coronary artery disease involving native coronary artery of native heart with unstable angina pectoris (CMS/HCC)  -     Ambulatory Referral to Cardiology    4. Chronic bronchitis, unspecified chronic bronchitis type (CMS/HCC)  -     Ambulatory Referral to Pulmonology    5. Acute on chronic respiratory failure with hypoxia and hypercapnia (CMS/HCC)  -     Ambulatory Referral to Pulmonology    6. Encounter for screening mammogram for malignant neoplasm of breast   -     Mammo Screening Bilateral With CAD; Future    Breathing stable after hospital discharge on 3 L nasal cannula.  Doing well on continue Lasix.  We'll continue to monitor weight as there appears to be a 7 pound weight gain since hospital admission.  She has been drinking Gatorade and noticed fluid in her arms.  She was recommended by home health to discontinue the Gatorade due to the high sodium.  However, she states her breathing is stable.  She continues to use her trilogy.  Will obtain CMP to monitor potassium, and renal function.  We will increase her Levemir from 25 units twice daily to 55 units twice daily due to continued hyperglycemia.  Will refer to cardiology for continued follow-up for  coronary artery disease, and cardiac rehab is recommended from [facility] after discharge from most recent stent placement.  In addition will refer to pulmonology or pulmonary evaluation since pulmonary status is not improving.    · Rx changes: Increase Levemir to 55 units twice daily  · Patient Education: Continue to monitor sugars, and breathing  · Compliance at present is estimated to be excellent.   · Efforts to improve compliance (if necessary) will be directed at Unnecessary at this time.     Follow-up:     Return in about 4 weeks (around 7/14/2021), or if symptoms worsen or fail to improve, for Recheck.    Preventative:  Health Maintenance   Topic Date Due   • COVID-19 Vaccine (1) Never done   • ZOSTER VACCINE (1 of 2) Never done   • ANNUAL WELLNESS VISIT  Never done   • URINE MICROALBUMIN  10/05/2018   • DIABETIC EYE EXAM  10/09/2019   • DIABETIC FOOT EXAM  12/26/2019   • PAP SMEAR  01/04/2020   • INFLUENZA VACCINE  08/01/2021   • HEMOGLOBIN A1C  10/24/2021   • MAMMOGRAM  01/10/2022   • LIPID PANEL  06/02/2022   • TDAP/TD VACCINES (3 - Td or Tdap) 11/10/2024   • COLORECTAL CANCER SCREENING  05/14/2026   • HEPATITIS C SCREENING  Completed   • Pneumococcal Vaccine 0-64  Completed       Recommended: none  Vaccine Counseling: N/A    Weight  -Class: Obese Class III extreme obesity: > or equal to 40kg/m2  -Patient's Body mass index is 56.52 kg/m². BMI is above normal parameters. Recommendations include: exercise counseling and nutrition counseling.   eat more fruits and vegetables, decrease soda or juice intake, increase water intake, increase physical activity, reduce screen time and reduce portion size    Alcohol use:  reports no history of alcohol use.  Nicotine status  reports that she has been smoking cigarettes. She has a 11.50 pack-year smoking history. She has never used smokeless tobacco.    Goals     • Fasting Blood Glucose       Barriers: compliance with diet      • Quit smoking /  using tobacco           RISK SCORE: 4          This document has been electronically signed by Nirmal Calvillo MD on June 16, 2021 18:34 CDT

## 2021-06-17 ENCOUNTER — DOCUMENTATION (OUTPATIENT)
Dept: FAMILY MEDICINE CLINIC | Facility: CLINIC | Age: 61
End: 2021-06-17

## 2021-06-17 ENCOUNTER — HOME CARE VISIT (OUTPATIENT)
Dept: HOME HEALTH SERVICES | Facility: CLINIC | Age: 61
End: 2021-06-17

## 2021-06-17 VITALS
TEMPERATURE: 98.7 F | SYSTOLIC BLOOD PRESSURE: 122 MMHG | HEART RATE: 62 BPM | WEIGHT: 293 LBS | DIASTOLIC BLOOD PRESSURE: 70 MMHG | BODY MASS INDEX: 53.92 KG/M2 | HEIGHT: 62 IN | RESPIRATION RATE: 18 BRPM | OXYGEN SATURATION: 97 %

## 2021-06-17 LAB
ALBUMIN SERPL-MCNC: 3.5 G/DL (ref 3.5–5.2)
ALBUMIN/GLOB SERPL: 1.2 G/DL
ALP SERPL-CCNC: 151 U/L (ref 39–117)
ALT SERPL W P-5'-P-CCNC: 10 U/L (ref 1–33)
ANION GAP SERPL CALCULATED.3IONS-SCNC: 12.4 MMOL/L (ref 5–15)
AST SERPL-CCNC: 12 U/L (ref 1–32)
BILIRUB SERPL-MCNC: 0.2 MG/DL (ref 0–1.2)
BUN SERPL-MCNC: 30 MG/DL (ref 8–23)
BUN/CREAT SERPL: 15.2 (ref 7–25)
CALCIUM SPEC-SCNC: 7.1 MG/DL (ref 8.6–10.5)
CHLORIDE SERPL-SCNC: 101 MMOL/L (ref 98–107)
CO2 SERPL-SCNC: 24.6 MMOL/L (ref 22–29)
CREAT SERPL-MCNC: 1.98 MG/DL (ref 0.57–1)
GFR SERPL CREATININE-BSD FRML MDRD: 26 ML/MIN/1.73
GLOBULIN UR ELPH-MCNC: 3 GM/DL
GLUCOSE SERPL-MCNC: 445 MG/DL (ref 65–99)
POTASSIUM SERPL-SCNC: 5.1 MMOL/L (ref 3.5–5.2)
PROT SERPL-MCNC: 6.5 G/DL (ref 6–8.5)
SODIUM SERPL-SCNC: 138 MMOL/L (ref 136–145)

## 2021-06-17 PROCEDURE — G0300 HHS/HOSPICE OF LPN EA 15 MIN: HCPCS

## 2021-06-17 PROCEDURE — G0299 HHS/HOSPICE OF RN EA 15 MIN: HCPCS

## 2021-06-17 NOTE — PROGRESS NOTES
Received a call from the lab with a critical value of her glucose being 445. Will notify Dr. Calvillo to notify the patient.       This document has been electronically signed by Haily Mcneil MD on June 17, 2021 01:25 CDT

## 2021-06-22 ENCOUNTER — HOME CARE VISIT (OUTPATIENT)
Dept: HOME HEALTH SERVICES | Facility: CLINIC | Age: 61
End: 2021-06-22

## 2021-06-22 VITALS
TEMPERATURE: 97.4 F | SYSTOLIC BLOOD PRESSURE: 144 MMHG | HEART RATE: 84 BPM | RESPIRATION RATE: 24 BRPM | DIASTOLIC BLOOD PRESSURE: 70 MMHG

## 2021-06-22 PROCEDURE — G0494 LPN CARE EA 15MIN HH/HOSPICE: HCPCS

## 2021-06-22 PROCEDURE — G0493 RN CARE EA 15 MIN HH/HOSPICE: HCPCS

## 2021-06-22 PROCEDURE — G0179 MD RECERTIFICATION HHA PT: HCPCS | Performed by: FAMILY MEDICINE

## 2021-06-25 ENCOUNTER — HOME CARE VISIT (OUTPATIENT)
Dept: HOME HEALTH SERVICES | Facility: CLINIC | Age: 61
End: 2021-06-25

## 2021-06-25 VITALS
OXYGEN SATURATION: 95 % | HEART RATE: 76 BPM | RESPIRATION RATE: 18 BRPM | DIASTOLIC BLOOD PRESSURE: 70 MMHG | SYSTOLIC BLOOD PRESSURE: 146 MMHG | TEMPERATURE: 96.9 F

## 2021-06-25 PROCEDURE — G0493 RN CARE EA 15 MIN HH/HOSPICE: HCPCS

## 2021-06-27 ENCOUNTER — APPOINTMENT (OUTPATIENT)
Dept: GENERAL RADIOLOGY | Facility: HOSPITAL | Age: 61
End: 2021-06-27

## 2021-06-27 ENCOUNTER — HOSPITAL ENCOUNTER (OUTPATIENT)
Facility: HOSPITAL | Age: 61
Setting detail: OBSERVATION
Discharge: HOME | End: 2021-07-01
Attending: FAMILY MEDICINE | Admitting: FAMILY MEDICINE

## 2021-06-27 DIAGNOSIS — R07.9 CHEST PAIN, UNSPECIFIED TYPE: ICD-10-CM

## 2021-06-27 DIAGNOSIS — Z74.09 IMPAIRED MOBILITY AND ACTIVITIES OF DAILY LIVING: ICD-10-CM

## 2021-06-27 DIAGNOSIS — E83.42 HYPOMAGNESEMIA: Primary | ICD-10-CM

## 2021-06-27 DIAGNOSIS — Z74.09 IMPAIRED FUNCTIONAL MOBILITY, BALANCE, GAIT, AND ENDURANCE: ICD-10-CM

## 2021-06-27 DIAGNOSIS — Z78.9 IMPAIRED MOBILITY AND ACTIVITIES OF DAILY LIVING: ICD-10-CM

## 2021-06-27 PROBLEM — J96.11 CHRONIC RESPIRATORY FAILURE WITH HYPOXIA: Status: ACTIVE | Noted: 2021-05-13

## 2021-06-27 LAB
ALBUMIN SERPL-MCNC: 3.2 G/DL (ref 3.5–5.2)
ALBUMIN/GLOB SERPL: 0.9 G/DL
ALP SERPL-CCNC: 145 U/L (ref 39–117)
ALT SERPL W P-5'-P-CCNC: 11 U/L (ref 1–33)
ANION GAP SERPL CALCULATED.3IONS-SCNC: 8 MMOL/L (ref 5–15)
AST SERPL-CCNC: 11 U/L (ref 1–32)
BASOPHILS # BLD AUTO: 0.05 10*3/MM3 (ref 0–0.2)
BASOPHILS NFR BLD AUTO: 0.6 % (ref 0–1.5)
BILIRUB SERPL-MCNC: 0.2 MG/DL (ref 0–1.2)
BUN SERPL-MCNC: 31 MG/DL (ref 8–23)
BUN/CREAT SERPL: 12.8 (ref 7–25)
CALCIUM SPEC-SCNC: 8.1 MG/DL (ref 8.6–10.5)
CHLORIDE SERPL-SCNC: 104 MMOL/L (ref 98–107)
CK SERPL-CCNC: 43 U/L (ref 20–180)
CO2 SERPL-SCNC: 25 MMOL/L (ref 22–29)
CREAT SERPL-MCNC: 2.43 MG/DL (ref 0.57–1)
DEPRECATED RDW RBC AUTO: 53.5 FL (ref 37–54)
EOSINOPHIL # BLD AUTO: 0.18 10*3/MM3 (ref 0–0.4)
EOSINOPHIL NFR BLD AUTO: 2.3 % (ref 0.3–6.2)
ERYTHROCYTE [DISTWIDTH] IN BLOOD BY AUTOMATED COUNT: 16.3 % (ref 12.3–15.4)
GFR SERPL CREATININE-BSD FRML MDRD: 20 ML/MIN/1.73
GLOBULIN UR ELPH-MCNC: 3.6 GM/DL
GLUCOSE BLDC GLUCOMTR-MCNC: 177 MG/DL (ref 70–130)
GLUCOSE SERPL-MCNC: 218 MG/DL (ref 65–99)
HCT VFR BLD AUTO: 28.1 % (ref 34–46.6)
HGB BLD-MCNC: 9 G/DL (ref 12–15.9)
HOLD SPECIMEN: NORMAL
HOLD SPECIMEN: NORMAL
IMM GRANULOCYTES # BLD AUTO: 0.05 10*3/MM3 (ref 0–0.05)
IMM GRANULOCYTES NFR BLD AUTO: 0.6 % (ref 0–0.5)
LIPASE SERPL-CCNC: 31 U/L (ref 13–60)
LYMPHOCYTES # BLD AUTO: 2.68 10*3/MM3 (ref 0.7–3.1)
LYMPHOCYTES NFR BLD AUTO: 33.8 % (ref 19.6–45.3)
MAGNESIUM SERPL-MCNC: 1.2 MG/DL (ref 1.6–2.4)
MCH RBC QN AUTO: 28.7 PG (ref 26.6–33)
MCHC RBC AUTO-ENTMCNC: 32 G/DL (ref 31.5–35.7)
MCV RBC AUTO: 89.5 FL (ref 79–97)
MONOCYTES # BLD AUTO: 0.5 10*3/MM3 (ref 0.1–0.9)
MONOCYTES NFR BLD AUTO: 6.3 % (ref 5–12)
NEUTROPHILS NFR BLD AUTO: 4.47 10*3/MM3 (ref 1.7–7)
NEUTROPHILS NFR BLD AUTO: 56.4 % (ref 42.7–76)
NRBC BLD AUTO-RTO: 0 /100 WBC (ref 0–0.2)
NT-PROBNP SERPL-MCNC: 1542 PG/ML (ref 0–900)
PLATELET # BLD AUTO: 234 10*3/MM3 (ref 140–450)
PMV BLD AUTO: 9.4 FL (ref 6–12)
POTASSIUM SERPL-SCNC: 4.9 MMOL/L (ref 3.5–5.2)
PROT SERPL-MCNC: 6.8 G/DL (ref 6–8.5)
RBC # BLD AUTO: 3.14 10*6/MM3 (ref 3.77–5.28)
SODIUM SERPL-SCNC: 137 MMOL/L (ref 136–145)
TROPONIN T SERPL-MCNC: <0.01 NG/ML (ref 0–0.03)
WBC # BLD AUTO: 7.93 10*3/MM3 (ref 3.4–10.8)
WHOLE BLOOD HOLD SPECIMEN: NORMAL

## 2021-06-27 PROCEDURE — 85025 COMPLETE CBC W/AUTO DIFF WBC: CPT

## 2021-06-27 PROCEDURE — 83735 ASSAY OF MAGNESIUM: CPT | Performed by: FAMILY MEDICINE

## 2021-06-27 PROCEDURE — 94799 UNLISTED PULMONARY SVC/PX: CPT

## 2021-06-27 PROCEDURE — 80053 COMPREHEN METABOLIC PANEL: CPT

## 2021-06-27 PROCEDURE — 96375 TX/PRO/DX INJ NEW DRUG ADDON: CPT

## 2021-06-27 PROCEDURE — G0378 HOSPITAL OBSERVATION PER HR: HCPCS

## 2021-06-27 PROCEDURE — 71046 X-RAY EXAM CHEST 2 VIEWS: CPT

## 2021-06-27 PROCEDURE — 99285 EMERGENCY DEPT VISIT HI MDM: CPT

## 2021-06-27 PROCEDURE — 82962 GLUCOSE BLOOD TEST: CPT

## 2021-06-27 PROCEDURE — 83880 ASSAY OF NATRIURETIC PEPTIDE: CPT

## 2021-06-27 PROCEDURE — 83690 ASSAY OF LIPASE: CPT | Performed by: FAMILY MEDICINE

## 2021-06-27 PROCEDURE — 93005 ELECTROCARDIOGRAM TRACING: CPT

## 2021-06-27 PROCEDURE — 25010000002 MAGNESIUM SULFATE 2 GM/50ML SOLUTION: Performed by: FAMILY MEDICINE

## 2021-06-27 PROCEDURE — 96365 THER/PROPH/DIAG IV INF INIT: CPT

## 2021-06-27 PROCEDURE — 84484 ASSAY OF TROPONIN QUANT: CPT

## 2021-06-27 PROCEDURE — 93010 ELECTROCARDIOGRAM REPORT: CPT | Performed by: INTERNAL MEDICINE

## 2021-06-27 PROCEDURE — 25010000002 MORPHINE PER 10 MG: Performed by: FAMILY MEDICINE

## 2021-06-27 PROCEDURE — 82550 ASSAY OF CK (CPK): CPT | Performed by: FAMILY MEDICINE

## 2021-06-27 PROCEDURE — 63710000001 INSULIN DETEMIR PER 5 UNITS: Performed by: STUDENT IN AN ORGANIZED HEALTH CARE EDUCATION/TRAINING PROGRAM

## 2021-06-27 PROCEDURE — 93005 ELECTROCARDIOGRAM TRACING: CPT | Performed by: FAMILY MEDICINE

## 2021-06-27 PROCEDURE — 99219 PR INITIAL OBSERVATION CARE/DAY 50 MINUTES: CPT | Performed by: STUDENT IN AN ORGANIZED HEALTH CARE EDUCATION/TRAINING PROGRAM

## 2021-06-27 PROCEDURE — 96366 THER/PROPH/DIAG IV INF ADDON: CPT

## 2021-06-27 PROCEDURE — 94660 CPAP INITIATION&MGMT: CPT

## 2021-06-27 PROCEDURE — 25010000002 HEPARIN (PORCINE) PER 1000 UNITS: Performed by: STUDENT IN AN ORGANIZED HEALTH CARE EDUCATION/TRAINING PROGRAM

## 2021-06-27 PROCEDURE — 96372 THER/PROPH/DIAG INJ SC/IM: CPT

## 2021-06-27 RX ORDER — ACETAMINOPHEN 325 MG/1
650 TABLET ORAL EVERY 4 HOURS PRN
Status: DISCONTINUED | OUTPATIENT
Start: 2021-06-27 | End: 2021-07-01 | Stop reason: HOSPADM

## 2021-06-27 RX ORDER — ASPIRIN 81 MG/1
81 TABLET ORAL DAILY
Status: DISCONTINUED | OUTPATIENT
Start: 2021-06-28 | End: 2021-07-01 | Stop reason: HOSPADM

## 2021-06-27 RX ORDER — CYCLOBENZAPRINE HCL 10 MG
10 TABLET ORAL 2 TIMES DAILY PRN
Status: DISCONTINUED | OUTPATIENT
Start: 2021-06-27 | End: 2021-07-01 | Stop reason: HOSPADM

## 2021-06-27 RX ORDER — DULOXETIN HYDROCHLORIDE 20 MG/1
20 CAPSULE, DELAYED RELEASE ORAL DAILY
Status: DISCONTINUED | OUTPATIENT
Start: 2021-06-28 | End: 2021-07-01 | Stop reason: HOSPADM

## 2021-06-27 RX ORDER — MAGNESIUM SULFATE HEPTAHYDRATE 40 MG/ML
2 INJECTION, SOLUTION INTRAVENOUS AS NEEDED
Status: DISCONTINUED | OUTPATIENT
Start: 2021-06-27 | End: 2021-07-01 | Stop reason: HOSPADM

## 2021-06-27 RX ORDER — NICOTINE POLACRILEX 4 MG
15 LOZENGE BUCCAL
Status: DISCONTINUED | OUTPATIENT
Start: 2021-06-27 | End: 2021-07-01 | Stop reason: HOSPADM

## 2021-06-27 RX ORDER — ISOSORBIDE MONONITRATE 30 MG/1
30 TABLET, EXTENDED RELEASE ORAL DAILY
Status: DISCONTINUED | OUTPATIENT
Start: 2021-06-28 | End: 2021-07-01 | Stop reason: HOSPADM

## 2021-06-27 RX ORDER — SODIUM CHLORIDE 9 MG/ML
75 INJECTION, SOLUTION INTRAVENOUS CONTINUOUS
Status: DISCONTINUED | OUTPATIENT
Start: 2021-06-27 | End: 2021-06-28

## 2021-06-27 RX ORDER — LISINOPRIL 10 MG/1
10 TABLET ORAL DAILY
Status: DISCONTINUED | OUTPATIENT
Start: 2021-06-28 | End: 2021-06-29

## 2021-06-27 RX ORDER — IPRATROPIUM BROMIDE AND ALBUTEROL SULFATE 2.5; .5 MG/3ML; MG/3ML
3 SOLUTION RESPIRATORY (INHALATION)
Status: DISCONTINUED | OUTPATIENT
Start: 2021-06-27 | End: 2021-07-01 | Stop reason: HOSPADM

## 2021-06-27 RX ORDER — MAGNESIUM SULFATE HEPTAHYDRATE 40 MG/ML
2 INJECTION, SOLUTION INTRAVENOUS ONCE
Status: COMPLETED | OUTPATIENT
Start: 2021-06-27 | End: 2021-06-27

## 2021-06-27 RX ORDER — NITROGLYCERIN 0.4 MG/1
0.4 TABLET SUBLINGUAL
Status: DISCONTINUED | OUTPATIENT
Start: 2021-06-27 | End: 2021-06-27

## 2021-06-27 RX ORDER — HEPARIN SODIUM 5000 [USP'U]/ML
5000 INJECTION, SOLUTION INTRAVENOUS; SUBCUTANEOUS EVERY 8 HOURS SCHEDULED
Status: DISCONTINUED | OUTPATIENT
Start: 2021-06-27 | End: 2021-07-01 | Stop reason: HOSPADM

## 2021-06-27 RX ORDER — FERROUS SULFATE TAB EC 324 MG (65 MG FE EQUIVALENT) 324 (65 FE) MG
324 TABLET DELAYED RESPONSE ORAL
Status: DISCONTINUED | OUTPATIENT
Start: 2021-06-28 | End: 2021-07-01 | Stop reason: HOSPADM

## 2021-06-27 RX ORDER — MONTELUKAST SODIUM 10 MG/1
10 TABLET ORAL NIGHTLY
Status: DISCONTINUED | OUTPATIENT
Start: 2021-06-28 | End: 2021-07-01 | Stop reason: HOSPADM

## 2021-06-27 RX ORDER — SODIUM BICARBONATE 650 MG/1
650 TABLET ORAL 2 TIMES DAILY
Status: DISCONTINUED | OUTPATIENT
Start: 2021-06-27 | End: 2021-07-01 | Stop reason: HOSPADM

## 2021-06-27 RX ORDER — SODIUM CHLORIDE 0.9 % (FLUSH) 0.9 %
10 SYRINGE (ML) INJECTION EVERY 12 HOURS SCHEDULED
Status: DISCONTINUED | OUTPATIENT
Start: 2021-06-27 | End: 2021-07-01 | Stop reason: HOSPADM

## 2021-06-27 RX ORDER — METOPROLOL TARTRATE 50 MG/1
100 TABLET, FILM COATED ORAL EVERY 12 HOURS SCHEDULED
Status: DISCONTINUED | OUTPATIENT
Start: 2021-06-27 | End: 2021-07-01 | Stop reason: HOSPADM

## 2021-06-27 RX ORDER — CETIRIZINE HYDROCHLORIDE 10 MG/1
10 TABLET ORAL DAILY
Status: DISCONTINUED | OUTPATIENT
Start: 2021-06-28 | End: 2021-07-01 | Stop reason: HOSPADM

## 2021-06-27 RX ORDER — GABAPENTIN 400 MG/1
800 CAPSULE ORAL EVERY 8 HOURS SCHEDULED
Status: DISCONTINUED | OUTPATIENT
Start: 2021-06-27 | End: 2021-07-01 | Stop reason: HOSPADM

## 2021-06-27 RX ORDER — SODIUM CHLORIDE 0.9 % (FLUSH) 0.9 %
10 SYRINGE (ML) INJECTION AS NEEDED
Status: DISCONTINUED | OUTPATIENT
Start: 2021-06-27 | End: 2021-07-01 | Stop reason: HOSPADM

## 2021-06-27 RX ORDER — BUDESONIDE AND FORMOTEROL FUMARATE DIHYDRATE 160; 4.5 UG/1; UG/1
2 AEROSOL RESPIRATORY (INHALATION)
Status: DISCONTINUED | OUTPATIENT
Start: 2021-06-27 | End: 2021-07-01 | Stop reason: HOSPADM

## 2021-06-27 RX ORDER — BUMETANIDE 0.25 MG/ML
1 INJECTION INTRAMUSCULAR; INTRAVENOUS ONCE
Status: COMPLETED | OUTPATIENT
Start: 2021-06-27 | End: 2021-06-27

## 2021-06-27 RX ORDER — MAGNESIUM SULFATE HEPTAHYDRATE 40 MG/ML
4 INJECTION, SOLUTION INTRAVENOUS AS NEEDED
Status: DISCONTINUED | OUTPATIENT
Start: 2021-06-27 | End: 2021-07-01 | Stop reason: HOSPADM

## 2021-06-27 RX ORDER — PANTOPRAZOLE SODIUM 40 MG/1
40 TABLET, DELAYED RELEASE ORAL NIGHTLY
Status: DISCONTINUED | OUTPATIENT
Start: 2021-06-27 | End: 2021-07-01 | Stop reason: HOSPADM

## 2021-06-27 RX ORDER — ONDANSETRON 4 MG/1
4 TABLET, ORALLY DISINTEGRATING ORAL EVERY 8 HOURS PRN
Status: DISCONTINUED | OUTPATIENT
Start: 2021-06-27 | End: 2021-07-01 | Stop reason: HOSPADM

## 2021-06-27 RX ORDER — ATORVASTATIN CALCIUM 40 MG/1
40 TABLET, FILM COATED ORAL NIGHTLY
Status: DISCONTINUED | OUTPATIENT
Start: 2021-06-28 | End: 2021-07-01 | Stop reason: HOSPADM

## 2021-06-27 RX ORDER — OXYBUTYNIN CHLORIDE 5 MG/1
5 TABLET, EXTENDED RELEASE ORAL DAILY
Status: DISCONTINUED | OUTPATIENT
Start: 2021-06-28 | End: 2021-07-01 | Stop reason: HOSPADM

## 2021-06-27 RX ORDER — DEXTROSE MONOHYDRATE 25 G/50ML
25 INJECTION, SOLUTION INTRAVENOUS
Status: DISCONTINUED | OUTPATIENT
Start: 2021-06-27 | End: 2021-07-01 | Stop reason: HOSPADM

## 2021-06-27 RX ORDER — CLOPIDOGREL BISULFATE 75 MG/1
75 TABLET ORAL DAILY
Status: DISCONTINUED | OUTPATIENT
Start: 2021-06-28 | End: 2021-07-01 | Stop reason: HOSPADM

## 2021-06-27 RX ORDER — NYSTATIN 100000 [USP'U]/G
POWDER TOPICAL 3 TIMES DAILY
Status: DISCONTINUED | OUTPATIENT
Start: 2021-06-27 | End: 2021-07-01 | Stop reason: HOSPADM

## 2021-06-27 RX ADMIN — NYSTATIN: 100000 POWDER TOPICAL at 21:15

## 2021-06-27 RX ADMIN — PANTOPRAZOLE SODIUM 40 MG: 40 TABLET, DELAYED RELEASE ORAL at 21:06

## 2021-06-27 RX ADMIN — MORPHINE SULFATE 4 MG: 4 INJECTION INTRAVENOUS at 17:25

## 2021-06-27 RX ADMIN — MAGNESIUM SULFATE HEPTAHYDRATE 2 G: 40 INJECTION, SOLUTION INTRAVENOUS at 18:00

## 2021-06-27 RX ADMIN — IPRATROPIUM BROMIDE AND ALBUTEROL SULFATE 3 ML: 2.5; .5 SOLUTION RESPIRATORY (INHALATION) at 21:46

## 2021-06-27 RX ADMIN — HEPARIN SODIUM 5000 UNITS: 5000 INJECTION INTRAVENOUS; SUBCUTANEOUS at 21:03

## 2021-06-27 RX ADMIN — BUMETANIDE 1 MG: 0.25 INJECTION INTRAMUSCULAR; INTRAVENOUS at 21:03

## 2021-06-27 RX ADMIN — INSULIN DETEMIR 45 UNITS: 100 INJECTION, SOLUTION SUBCUTANEOUS at 21:15

## 2021-06-27 RX ADMIN — SODIUM BICARBONATE 650 MG: 650 TABLET ORAL at 21:15

## 2021-06-27 RX ADMIN — GABAPENTIN 800 MG: 400 CAPSULE ORAL at 21:03

## 2021-06-27 RX ADMIN — METOPROLOL TARTRATE 100 MG: 50 TABLET, FILM COATED ORAL at 21:03

## 2021-06-28 LAB
ALBUMIN SERPL-MCNC: 2.9 G/DL (ref 3.5–5.2)
ALBUMIN/GLOB SERPL: 0.8 G/DL
ALP SERPL-CCNC: 130 U/L (ref 39–117)
ALT SERPL W P-5'-P-CCNC: 10 U/L (ref 1–33)
ANION GAP SERPL CALCULATED.3IONS-SCNC: 10 MMOL/L (ref 5–15)
AST SERPL-CCNC: 12 U/L (ref 1–32)
BASOPHILS # BLD AUTO: 0.05 10*3/MM3 (ref 0–0.2)
BASOPHILS NFR BLD AUTO: 0.7 % (ref 0–1.5)
BILIRUB SERPL-MCNC: <0.2 MG/DL (ref 0–1.2)
BUN SERPL-MCNC: 37 MG/DL (ref 8–23)
BUN/CREAT SERPL: 16.9 (ref 7–25)
CALCIUM SPEC-SCNC: 8.2 MG/DL (ref 8.6–10.5)
CHLORIDE SERPL-SCNC: 102 MMOL/L (ref 98–107)
CO2 SERPL-SCNC: 21 MMOL/L (ref 22–29)
CREAT SERPL-MCNC: 2.19 MG/DL (ref 0.57–1)
DEPRECATED RDW RBC AUTO: 55.2 FL (ref 37–54)
EOSINOPHIL # BLD AUTO: 0.2 10*3/MM3 (ref 0–0.4)
EOSINOPHIL NFR BLD AUTO: 2.7 % (ref 0.3–6.2)
ERYTHROCYTE [DISTWIDTH] IN BLOOD BY AUTOMATED COUNT: 16.3 % (ref 12.3–15.4)
FLUAV RNA RESP QL NAA+PROBE: NOT DETECTED
FLUBV RNA RESP QL NAA+PROBE: NOT DETECTED
GFR SERPL CREATININE-BSD FRML MDRD: 23 ML/MIN/1.73
GLOBULIN UR ELPH-MCNC: 3.7 GM/DL
GLUCOSE BLDC GLUCOMTR-MCNC: 222 MG/DL (ref 70–130)
GLUCOSE BLDC GLUCOMTR-MCNC: 240 MG/DL (ref 70–130)
GLUCOSE BLDC GLUCOMTR-MCNC: 299 MG/DL (ref 70–130)
GLUCOSE BLDC GLUCOMTR-MCNC: 299 MG/DL (ref 70–130)
GLUCOSE SERPL-MCNC: 291 MG/DL (ref 65–99)
HCT VFR BLD AUTO: 28.4 % (ref 34–46.6)
HGB BLD-MCNC: 8.9 G/DL (ref 12–15.9)
IMM GRANULOCYTES # BLD AUTO: 0.05 10*3/MM3 (ref 0–0.05)
IMM GRANULOCYTES NFR BLD AUTO: 0.7 % (ref 0–0.5)
LYMPHOCYTES # BLD AUTO: 2.89 10*3/MM3 (ref 0.7–3.1)
LYMPHOCYTES NFR BLD AUTO: 38.8 % (ref 19.6–45.3)
MAGNESIUM SERPL-MCNC: 2.3 MG/DL (ref 1.6–2.4)
MCH RBC QN AUTO: 28.9 PG (ref 26.6–33)
MCHC RBC AUTO-ENTMCNC: 31.3 G/DL (ref 31.5–35.7)
MCV RBC AUTO: 92.2 FL (ref 79–97)
MONOCYTES # BLD AUTO: 0.41 10*3/MM3 (ref 0.1–0.9)
MONOCYTES NFR BLD AUTO: 5.5 % (ref 5–12)
NEUTROPHILS NFR BLD AUTO: 3.84 10*3/MM3 (ref 1.7–7)
NEUTROPHILS NFR BLD AUTO: 51.6 % (ref 42.7–76)
NRBC BLD AUTO-RTO: 0 /100 WBC (ref 0–0.2)
PLATELET # BLD AUTO: 226 10*3/MM3 (ref 140–450)
PMV BLD AUTO: 9.4 FL (ref 6–12)
POTASSIUM SERPL-SCNC: 4.7 MMOL/L (ref 3.5–5.2)
PROT SERPL-MCNC: 6.6 G/DL (ref 6–8.5)
QT INTERVAL: 434 MS
QTC INTERVAL: 461 MS
RBC # BLD AUTO: 3.08 10*6/MM3 (ref 3.77–5.28)
SARS-COV-2 RNA RESP QL NAA+PROBE: NOT DETECTED
SODIUM SERPL-SCNC: 133 MMOL/L (ref 136–145)
WBC # BLD AUTO: 7.44 10*3/MM3 (ref 3.4–10.8)

## 2021-06-28 PROCEDURE — 94660 CPAP INITIATION&MGMT: CPT

## 2021-06-28 PROCEDURE — 25010000002 MAGNESIUM SULFATE 2 GM/50ML SOLUTION: Performed by: STUDENT IN AN ORGANIZED HEALTH CARE EDUCATION/TRAINING PROGRAM

## 2021-06-28 PROCEDURE — 25010000002 HEPARIN (PORCINE) PER 1000 UNITS: Performed by: STUDENT IN AN ORGANIZED HEALTH CARE EDUCATION/TRAINING PROGRAM

## 2021-06-28 PROCEDURE — 99225 PR SBSQ OBSERVATION CARE/DAY 25 MINUTES: CPT | Performed by: STUDENT IN AN ORGANIZED HEALTH CARE EDUCATION/TRAINING PROGRAM

## 2021-06-28 PROCEDURE — 63710000001 INSULIN ASPART PER 5 UNITS: Performed by: STUDENT IN AN ORGANIZED HEALTH CARE EDUCATION/TRAINING PROGRAM

## 2021-06-28 PROCEDURE — 96376 TX/PRO/DX INJ SAME DRUG ADON: CPT

## 2021-06-28 PROCEDURE — 63710000001 INSULIN DETEMIR PER 5 UNITS: Performed by: STUDENT IN AN ORGANIZED HEALTH CARE EDUCATION/TRAINING PROGRAM

## 2021-06-28 PROCEDURE — G0378 HOSPITAL OBSERVATION PER HR: HCPCS

## 2021-06-28 PROCEDURE — 96372 THER/PROPH/DIAG INJ SC/IM: CPT

## 2021-06-28 PROCEDURE — 83735 ASSAY OF MAGNESIUM: CPT | Performed by: STUDENT IN AN ORGANIZED HEALTH CARE EDUCATION/TRAINING PROGRAM

## 2021-06-28 PROCEDURE — 94799 UNLISTED PULMONARY SVC/PX: CPT

## 2021-06-28 PROCEDURE — 96361 HYDRATE IV INFUSION ADD-ON: CPT

## 2021-06-28 PROCEDURE — 80053 COMPREHEN METABOLIC PANEL: CPT | Performed by: STUDENT IN AN ORGANIZED HEALTH CARE EDUCATION/TRAINING PROGRAM

## 2021-06-28 PROCEDURE — 96366 THER/PROPH/DIAG IV INF ADDON: CPT

## 2021-06-28 PROCEDURE — 85025 COMPLETE CBC W/AUTO DIFF WBC: CPT | Performed by: STUDENT IN AN ORGANIZED HEALTH CARE EDUCATION/TRAINING PROGRAM

## 2021-06-28 PROCEDURE — 36415 COLL VENOUS BLD VENIPUNCTURE: CPT | Performed by: STUDENT IN AN ORGANIZED HEALTH CARE EDUCATION/TRAINING PROGRAM

## 2021-06-28 PROCEDURE — 87636 SARSCOV2 & INF A&B AMP PRB: CPT | Performed by: STUDENT IN AN ORGANIZED HEALTH CARE EDUCATION/TRAINING PROGRAM

## 2021-06-28 PROCEDURE — 82962 GLUCOSE BLOOD TEST: CPT

## 2021-06-28 RX ORDER — METOLAZONE 5 MG/1
10 TABLET ORAL DAILY
Status: DISCONTINUED | OUTPATIENT
Start: 2021-06-28 | End: 2021-06-30

## 2021-06-28 RX ORDER — BUMETANIDE 0.25 MG/ML
1 INJECTION INTRAMUSCULAR; INTRAVENOUS DAILY
Status: DISCONTINUED | OUTPATIENT
Start: 2021-06-28 | End: 2021-06-29

## 2021-06-28 RX ADMIN — HEPARIN SODIUM 5000 UNITS: 5000 INJECTION INTRAVENOUS; SUBCUTANEOUS at 21:21

## 2021-06-28 RX ADMIN — GABAPENTIN 800 MG: 400 CAPSULE ORAL at 21:19

## 2021-06-28 RX ADMIN — MAGNESIUM SULFATE HEPTAHYDRATE 2 G: 40 INJECTION, SOLUTION INTRAVENOUS at 04:10

## 2021-06-28 RX ADMIN — GABAPENTIN 800 MG: 400 CAPSULE ORAL at 14:08

## 2021-06-28 RX ADMIN — ACETAMINOPHEN 650 MG: 325 TABLET, FILM COATED ORAL at 10:56

## 2021-06-28 RX ADMIN — METOPROLOL TARTRATE 100 MG: 50 TABLET, FILM COATED ORAL at 21:20

## 2021-06-28 RX ADMIN — FERROUS SULFATE TAB EC 324 MG (65 MG FE EQUIVALENT) 324 MG: 324 (65 FE) TABLET DELAYED RESPONSE at 09:18

## 2021-06-28 RX ADMIN — SODIUM BICARBONATE 650 MG: 650 TABLET ORAL at 21:21

## 2021-06-28 RX ADMIN — INSULIN ASPART 8 UNITS: 100 INJECTION, SOLUTION INTRAVENOUS; SUBCUTANEOUS at 17:00

## 2021-06-28 RX ADMIN — CLOPIDOGREL BISULFATE 75 MG: 75 TABLET ORAL at 09:18

## 2021-06-28 RX ADMIN — INSULIN DETEMIR 45 UNITS: 100 INJECTION, SOLUTION SUBCUTANEOUS at 21:22

## 2021-06-28 RX ADMIN — IPRATROPIUM BROMIDE AND ALBUTEROL SULFATE 3 ML: 2.5; .5 SOLUTION RESPIRATORY (INHALATION) at 15:16

## 2021-06-28 RX ADMIN — SODIUM CHLORIDE 75 ML/HR: 9 INJECTION, SOLUTION INTRAVENOUS at 00:33

## 2021-06-28 RX ADMIN — PANTOPRAZOLE SODIUM 40 MG: 40 TABLET, DELAYED RELEASE ORAL at 21:19

## 2021-06-28 RX ADMIN — IPRATROPIUM BROMIDE AND ALBUTEROL SULFATE 3 ML: 2.5; .5 SOLUTION RESPIRATORY (INHALATION) at 20:27

## 2021-06-28 RX ADMIN — SODIUM CHLORIDE, PRESERVATIVE FREE 10 ML: 5 INJECTION INTRAVENOUS at 21:21

## 2021-06-28 RX ADMIN — CYCLOBENZAPRINE 10 MG: 10 TABLET, FILM COATED ORAL at 17:03

## 2021-06-28 RX ADMIN — BUMETANIDE 1 MG: 0.25 INJECTION INTRAMUSCULAR; INTRAVENOUS at 10:56

## 2021-06-28 RX ADMIN — SODIUM CHLORIDE, PRESERVATIVE FREE 10 ML: 5 INJECTION INTRAVENOUS at 09:19

## 2021-06-28 RX ADMIN — INSULIN ASPART 12 UNITS: 100 INJECTION, SOLUTION INTRAVENOUS; SUBCUTANEOUS at 17:00

## 2021-06-28 RX ADMIN — HEPARIN SODIUM 5000 UNITS: 5000 INJECTION INTRAVENOUS; SUBCUTANEOUS at 14:09

## 2021-06-28 RX ADMIN — LISINOPRIL 10 MG: 5 TABLET ORAL at 09:18

## 2021-06-28 RX ADMIN — OXYBUTYNIN CHLORIDE 5 MG: 5 TABLET, EXTENDED RELEASE ORAL at 09:19

## 2021-06-28 RX ADMIN — CETIRIZINE HYDROCHLORIDE 10 MG: 10 TABLET, FILM COATED ORAL at 09:18

## 2021-06-28 RX ADMIN — DULOXETINE HYDROCHLORIDE 20 MG: 20 CAPSULE, DELAYED RELEASE ORAL at 09:18

## 2021-06-28 RX ADMIN — METOLAZONE 10 MG: 5 TABLET ORAL at 10:56

## 2021-06-28 RX ADMIN — INSULIN ASPART 12 UNITS: 100 INJECTION, SOLUTION INTRAVENOUS; SUBCUTANEOUS at 07:56

## 2021-06-28 RX ADMIN — ATORVASTATIN CALCIUM 40 MG: 40 TABLET, FILM COATED ORAL at 21:19

## 2021-06-28 RX ADMIN — NYSTATIN: 100000 POWDER TOPICAL at 09:20

## 2021-06-28 RX ADMIN — MAGNESIUM SULFATE HEPTAHYDRATE 2 G: 40 INJECTION, SOLUTION INTRAVENOUS at 01:55

## 2021-06-28 RX ADMIN — IPRATROPIUM BROMIDE AND ALBUTEROL SULFATE 3 ML: 2.5; .5 SOLUTION RESPIRATORY (INHALATION) at 07:33

## 2021-06-28 RX ADMIN — CYCLOBENZAPRINE 10 MG: 10 TABLET, FILM COATED ORAL at 01:08

## 2021-06-28 RX ADMIN — INSULIN DETEMIR 45 UNITS: 100 INJECTION, SOLUTION SUBCUTANEOUS at 09:19

## 2021-06-28 RX ADMIN — INSULIN ASPART 12 UNITS: 100 INJECTION, SOLUTION INTRAVENOUS; SUBCUTANEOUS at 12:02

## 2021-06-28 RX ADMIN — METOPROLOL TARTRATE 100 MG: 50 TABLET, FILM COATED ORAL at 09:18

## 2021-06-28 RX ADMIN — ASPIRIN 81 MG: 81 TABLET, FILM COATED ORAL at 09:18

## 2021-06-28 RX ADMIN — HEPARIN SODIUM 5000 UNITS: 5000 INJECTION INTRAVENOUS; SUBCUTANEOUS at 05:27

## 2021-06-28 RX ADMIN — ISOSORBIDE MONONITRATE 30 MG: 30 TABLET, EXTENDED RELEASE ORAL at 09:18

## 2021-06-28 RX ADMIN — MONTELUKAST SODIUM 10 MG: 10 TABLET, COATED ORAL at 21:19

## 2021-06-28 RX ADMIN — SODIUM BICARBONATE 650 MG: 650 TABLET ORAL at 09:18

## 2021-06-28 RX ADMIN — GABAPENTIN 800 MG: 400 CAPSULE ORAL at 05:27

## 2021-06-28 RX ADMIN — NYSTATIN: 100000 POWDER TOPICAL at 21:21

## 2021-06-29 DIAGNOSIS — IMO0002 UNCONTROLLED TYPE 2 DIABETES MELLITUS WITH DIABETIC POLYNEUROPATHY, WITH LONG-TERM CURRENT USE OF INSULIN: ICD-10-CM

## 2021-06-29 LAB
ALBUMIN SERPL-MCNC: 3.1 G/DL (ref 3.5–5.2)
ALBUMIN/GLOB SERPL: 0.8 G/DL
ALP SERPL-CCNC: 128 U/L (ref 39–117)
ALT SERPL W P-5'-P-CCNC: 8 U/L (ref 1–33)
ANION GAP SERPL CALCULATED.3IONS-SCNC: 11 MMOL/L (ref 5–15)
AST SERPL-CCNC: 10 U/L (ref 1–32)
BASOPHILS # BLD AUTO: 0.07 10*3/MM3 (ref 0–0.2)
BASOPHILS NFR BLD AUTO: 1 % (ref 0–1.5)
BILIRUB SERPL-MCNC: 0.2 MG/DL (ref 0–1.2)
BUN SERPL-MCNC: 43 MG/DL (ref 8–23)
BUN/CREAT SERPL: 16.1 (ref 7–25)
CALCIUM SPEC-SCNC: 9 MG/DL (ref 8.6–10.5)
CHLORIDE SERPL-SCNC: 102 MMOL/L (ref 98–107)
CO2 SERPL-SCNC: 21 MMOL/L (ref 22–29)
CREAT SERPL-MCNC: 2.67 MG/DL (ref 0.57–1)
DEPRECATED RDW RBC AUTO: 54.4 FL (ref 37–54)
EOSINOPHIL # BLD AUTO: 0.26 10*3/MM3 (ref 0–0.4)
EOSINOPHIL NFR BLD AUTO: 3.7 % (ref 0.3–6.2)
ERYTHROCYTE [DISTWIDTH] IN BLOOD BY AUTOMATED COUNT: 16.2 % (ref 12.3–15.4)
GFR SERPL CREATININE-BSD FRML MDRD: 18 ML/MIN/1.73
GLOBULIN UR ELPH-MCNC: 3.8 GM/DL
GLUCOSE BLDC GLUCOMTR-MCNC: 201 MG/DL (ref 70–130)
GLUCOSE BLDC GLUCOMTR-MCNC: 226 MG/DL (ref 70–130)
GLUCOSE BLDC GLUCOMTR-MCNC: 252 MG/DL (ref 70–130)
GLUCOSE BLDC GLUCOMTR-MCNC: 280 MG/DL (ref 70–130)
GLUCOSE SERPL-MCNC: 231 MG/DL (ref 65–99)
HCT VFR BLD AUTO: 30.4 % (ref 34–46.6)
HGB BLD-MCNC: 9.6 G/DL (ref 12–15.9)
IMM GRANULOCYTES # BLD AUTO: 0.09 10*3/MM3 (ref 0–0.05)
IMM GRANULOCYTES NFR BLD AUTO: 1.3 % (ref 0–0.5)
LYMPHOCYTES # BLD AUTO: 2.17 10*3/MM3 (ref 0.7–3.1)
LYMPHOCYTES NFR BLD AUTO: 30.9 % (ref 19.6–45.3)
MCH RBC QN AUTO: 28.7 PG (ref 26.6–33)
MCHC RBC AUTO-ENTMCNC: 31.6 G/DL (ref 31.5–35.7)
MCV RBC AUTO: 91 FL (ref 79–97)
MONOCYTES # BLD AUTO: 0.43 10*3/MM3 (ref 0.1–0.9)
MONOCYTES NFR BLD AUTO: 6.1 % (ref 5–12)
NEUTROPHILS NFR BLD AUTO: 4 10*3/MM3 (ref 1.7–7)
NEUTROPHILS NFR BLD AUTO: 57 % (ref 42.7–76)
NRBC BLD AUTO-RTO: 0 /100 WBC (ref 0–0.2)
PLATELET # BLD AUTO: 231 10*3/MM3 (ref 140–450)
PMV BLD AUTO: 9.8 FL (ref 6–12)
POTASSIUM SERPL-SCNC: 4.7 MMOL/L (ref 3.5–5.2)
PROT SERPL-MCNC: 6.9 G/DL (ref 6–8.5)
RBC # BLD AUTO: 3.34 10*6/MM3 (ref 3.77–5.28)
SODIUM SERPL-SCNC: 134 MMOL/L (ref 136–145)
WBC # BLD AUTO: 7.02 10*3/MM3 (ref 3.4–10.8)

## 2021-06-29 PROCEDURE — 94799 UNLISTED PULMONARY SVC/PX: CPT

## 2021-06-29 PROCEDURE — 96361 HYDRATE IV INFUSION ADD-ON: CPT

## 2021-06-29 PROCEDURE — P9047 ALBUMIN (HUMAN), 25%, 50ML: HCPCS | Performed by: STUDENT IN AN ORGANIZED HEALTH CARE EDUCATION/TRAINING PROGRAM

## 2021-06-29 PROCEDURE — 63710000001 INSULIN ASPART PER 5 UNITS: Performed by: STUDENT IN AN ORGANIZED HEALTH CARE EDUCATION/TRAINING PROGRAM

## 2021-06-29 PROCEDURE — 63710000001 INSULIN DETEMIR PER 5 UNITS: Performed by: STUDENT IN AN ORGANIZED HEALTH CARE EDUCATION/TRAINING PROGRAM

## 2021-06-29 PROCEDURE — 96372 THER/PROPH/DIAG INJ SC/IM: CPT

## 2021-06-29 PROCEDURE — 96376 TX/PRO/DX INJ SAME DRUG ADON: CPT

## 2021-06-29 PROCEDURE — 99225 PR SBSQ OBSERVATION CARE/DAY 25 MINUTES: CPT | Performed by: STUDENT IN AN ORGANIZED HEALTH CARE EDUCATION/TRAINING PROGRAM

## 2021-06-29 PROCEDURE — 25010000002 HEPARIN (PORCINE) PER 1000 UNITS: Performed by: STUDENT IN AN ORGANIZED HEALTH CARE EDUCATION/TRAINING PROGRAM

## 2021-06-29 PROCEDURE — 85025 COMPLETE CBC W/AUTO DIFF WBC: CPT | Performed by: STUDENT IN AN ORGANIZED HEALTH CARE EDUCATION/TRAINING PROGRAM

## 2021-06-29 PROCEDURE — 82962 GLUCOSE BLOOD TEST: CPT

## 2021-06-29 PROCEDURE — 94760 N-INVAS EAR/PLS OXIMETRY 1: CPT

## 2021-06-29 PROCEDURE — G0378 HOSPITAL OBSERVATION PER HR: HCPCS

## 2021-06-29 PROCEDURE — 96367 TX/PROPH/DG ADDL SEQ IV INF: CPT

## 2021-06-29 PROCEDURE — 80053 COMPREHEN METABOLIC PANEL: CPT | Performed by: STUDENT IN AN ORGANIZED HEALTH CARE EDUCATION/TRAINING PROGRAM

## 2021-06-29 PROCEDURE — 25010000002 ALBUMIN HUMAN 25% PER 50 ML: Performed by: STUDENT IN AN ORGANIZED HEALTH CARE EDUCATION/TRAINING PROGRAM

## 2021-06-29 RX ORDER — HYDRALAZINE HYDROCHLORIDE 25 MG/1
25 TABLET, FILM COATED ORAL EVERY 6 HOURS PRN
Status: DISCONTINUED | OUTPATIENT
Start: 2021-06-29 | End: 2021-07-01 | Stop reason: HOSPADM

## 2021-06-29 RX ORDER — SODIUM CHLORIDE 9 MG/ML
75 INJECTION, SOLUTION INTRAVENOUS CONTINUOUS
Status: DISCONTINUED | OUTPATIENT
Start: 2021-06-29 | End: 2021-06-30

## 2021-06-29 RX ORDER — BUMETANIDE 1 MG/1
1 TABLET ORAL DAILY
Status: DISCONTINUED | OUTPATIENT
Start: 2021-06-30 | End: 2021-06-30

## 2021-06-29 RX ORDER — ALBUMIN (HUMAN) 12.5 G/50ML
25 SOLUTION INTRAVENOUS ONCE
Status: COMPLETED | OUTPATIENT
Start: 2021-06-29 | End: 2021-06-29

## 2021-06-29 RX ADMIN — DULOXETINE HYDROCHLORIDE 20 MG: 20 CAPSULE, DELAYED RELEASE ORAL at 08:45

## 2021-06-29 RX ADMIN — GABAPENTIN 800 MG: 400 CAPSULE ORAL at 21:17

## 2021-06-29 RX ADMIN — ISOSORBIDE MONONITRATE 30 MG: 30 TABLET, EXTENDED RELEASE ORAL at 08:45

## 2021-06-29 RX ADMIN — INSULIN ASPART 12 UNITS: 100 INJECTION, SOLUTION INTRAVENOUS; SUBCUTANEOUS at 11:52

## 2021-06-29 RX ADMIN — IPRATROPIUM BROMIDE AND ALBUTEROL SULFATE 3 ML: 2.5; .5 SOLUTION RESPIRATORY (INHALATION) at 07:01

## 2021-06-29 RX ADMIN — SODIUM CHLORIDE 75 ML/HR: 9 INJECTION, SOLUTION INTRAVENOUS at 21:35

## 2021-06-29 RX ADMIN — INSULIN DETEMIR 45 UNITS: 100 INJECTION, SOLUTION SUBCUTANEOUS at 08:52

## 2021-06-29 RX ADMIN — CETIRIZINE HYDROCHLORIDE 10 MG: 10 TABLET, FILM COATED ORAL at 08:45

## 2021-06-29 RX ADMIN — SODIUM CHLORIDE, PRESERVATIVE FREE 10 ML: 5 INJECTION INTRAVENOUS at 21:22

## 2021-06-29 RX ADMIN — OXYBUTYNIN CHLORIDE 5 MG: 5 TABLET, EXTENDED RELEASE ORAL at 08:45

## 2021-06-29 RX ADMIN — GABAPENTIN 800 MG: 400 CAPSULE ORAL at 05:01

## 2021-06-29 RX ADMIN — SODIUM BICARBONATE 650 MG: 650 TABLET ORAL at 21:17

## 2021-06-29 RX ADMIN — FERROUS SULFATE TAB EC 324 MG (65 MG FE EQUIVALENT) 324 MG: 324 (65 FE) TABLET DELAYED RESPONSE at 08:45

## 2021-06-29 RX ADMIN — IPRATROPIUM BROMIDE AND ALBUTEROL SULFATE 3 ML: 2.5; .5 SOLUTION RESPIRATORY (INHALATION) at 19:41

## 2021-06-29 RX ADMIN — LISINOPRIL 10 MG: 5 TABLET ORAL at 08:45

## 2021-06-29 RX ADMIN — IPRATROPIUM BROMIDE AND ALBUTEROL SULFATE 3 ML: 2.5; .5 SOLUTION RESPIRATORY (INHALATION) at 14:50

## 2021-06-29 RX ADMIN — INSULIN ASPART 12 UNITS: 100 INJECTION, SOLUTION INTRAVENOUS; SUBCUTANEOUS at 08:44

## 2021-06-29 RX ADMIN — INSULIN DETEMIR 45 UNITS: 100 INJECTION, SOLUTION SUBCUTANEOUS at 21:22

## 2021-06-29 RX ADMIN — METOLAZONE 10 MG: 5 TABLET ORAL at 08:45

## 2021-06-29 RX ADMIN — SODIUM BICARBONATE 650 MG: 650 TABLET ORAL at 08:46

## 2021-06-29 RX ADMIN — CYCLOBENZAPRINE 10 MG: 10 TABLET, FILM COATED ORAL at 21:17

## 2021-06-29 RX ADMIN — INSULIN ASPART 8 UNITS: 100 INJECTION, SOLUTION INTRAVENOUS; SUBCUTANEOUS at 17:13

## 2021-06-29 RX ADMIN — SODIUM CHLORIDE 75 ML/HR: 9 INJECTION, SOLUTION INTRAVENOUS at 08:46

## 2021-06-29 RX ADMIN — BUMETANIDE 1 MG: 0.25 INJECTION INTRAMUSCULAR; INTRAVENOUS at 08:46

## 2021-06-29 RX ADMIN — METOPROLOL TARTRATE 100 MG: 50 TABLET, FILM COATED ORAL at 21:17

## 2021-06-29 RX ADMIN — IPRATROPIUM BROMIDE AND ALBUTEROL SULFATE 3 ML: 2.5; .5 SOLUTION RESPIRATORY (INHALATION) at 10:28

## 2021-06-29 RX ADMIN — HEPARIN SODIUM 5000 UNITS: 5000 INJECTION INTRAVENOUS; SUBCUTANEOUS at 13:13

## 2021-06-29 RX ADMIN — ALBUMIN HUMAN 25 G: 0.25 SOLUTION INTRAVENOUS at 08:46

## 2021-06-29 RX ADMIN — INSULIN ASPART 12 UNITS: 100 INJECTION, SOLUTION INTRAVENOUS; SUBCUTANEOUS at 11:51

## 2021-06-29 RX ADMIN — NYSTATIN: 100000 POWDER TOPICAL at 21:22

## 2021-06-29 RX ADMIN — ATORVASTATIN CALCIUM 40 MG: 40 TABLET, FILM COATED ORAL at 21:17

## 2021-06-29 RX ADMIN — INSULIN ASPART 12 UNITS: 100 INJECTION, SOLUTION INTRAVENOUS; SUBCUTANEOUS at 17:13

## 2021-06-29 RX ADMIN — GABAPENTIN 800 MG: 400 CAPSULE ORAL at 13:13

## 2021-06-29 RX ADMIN — ASPIRIN 81 MG: 81 TABLET, FILM COATED ORAL at 08:45

## 2021-06-29 RX ADMIN — HEPARIN SODIUM 5000 UNITS: 5000 INJECTION INTRAVENOUS; SUBCUTANEOUS at 21:17

## 2021-06-29 RX ADMIN — NYSTATIN: 100000 POWDER TOPICAL at 08:46

## 2021-06-29 RX ADMIN — PANTOPRAZOLE SODIUM 40 MG: 40 TABLET, DELAYED RELEASE ORAL at 21:17

## 2021-06-29 RX ADMIN — CLOPIDOGREL BISULFATE 75 MG: 75 TABLET ORAL at 08:45

## 2021-06-29 RX ADMIN — SODIUM CHLORIDE, PRESERVATIVE FREE 10 ML: 5 INJECTION INTRAVENOUS at 08:46

## 2021-06-29 RX ADMIN — HEPARIN SODIUM 5000 UNITS: 5000 INJECTION INTRAVENOUS; SUBCUTANEOUS at 05:02

## 2021-06-29 RX ADMIN — METOPROLOL TARTRATE 100 MG: 50 TABLET, FILM COATED ORAL at 08:45

## 2021-06-29 RX ADMIN — MONTELUKAST SODIUM 10 MG: 10 TABLET, COATED ORAL at 21:17

## 2021-06-30 ENCOUNTER — HOME CARE VISIT (OUTPATIENT)
Dept: HOME HEALTH SERVICES | Facility: CLINIC | Age: 61
End: 2021-06-30

## 2021-06-30 PROBLEM — E83.42 HYPOMAGNESEMIA: Status: RESOLVED | Noted: 2021-06-27 | Resolved: 2021-06-30

## 2021-06-30 LAB
ALBUMIN SERPL-MCNC: 3.3 G/DL (ref 3.5–5.2)
ALBUMIN/GLOB SERPL: 0.9 G/DL
ALP SERPL-CCNC: 134 U/L (ref 39–117)
ALT SERPL W P-5'-P-CCNC: 8 U/L (ref 1–33)
ANION GAP SERPL CALCULATED.3IONS-SCNC: 13 MMOL/L (ref 5–15)
AST SERPL-CCNC: 14 U/L (ref 1–32)
BASOPHILS # BLD AUTO: 0.05 10*3/MM3 (ref 0–0.2)
BASOPHILS NFR BLD AUTO: 0.8 % (ref 0–1.5)
BILIRUB SERPL-MCNC: 0.2 MG/DL (ref 0–1.2)
BUN SERPL-MCNC: 46 MG/DL (ref 8–23)
BUN/CREAT SERPL: 17.4 (ref 7–25)
CALCIUM SPEC-SCNC: 9.5 MG/DL (ref 8.6–10.5)
CHLORIDE SERPL-SCNC: 101 MMOL/L (ref 98–107)
CO2 SERPL-SCNC: 21 MMOL/L (ref 22–29)
CREAT SERPL-MCNC: 2.64 MG/DL (ref 0.57–1)
DEPRECATED RDW RBC AUTO: 54.4 FL (ref 37–54)
EOSINOPHIL # BLD AUTO: 0.19 10*3/MM3 (ref 0–0.4)
EOSINOPHIL NFR BLD AUTO: 3 % (ref 0.3–6.2)
ERYTHROCYTE [DISTWIDTH] IN BLOOD BY AUTOMATED COUNT: 16.1 % (ref 12.3–15.4)
GFR SERPL CREATININE-BSD FRML MDRD: 18 ML/MIN/1.73
GLOBULIN UR ELPH-MCNC: 3.5 GM/DL
GLUCOSE BLDC GLUCOMTR-MCNC: 184 MG/DL (ref 70–130)
GLUCOSE BLDC GLUCOMTR-MCNC: 235 MG/DL (ref 70–130)
GLUCOSE BLDC GLUCOMTR-MCNC: 243 MG/DL (ref 70–130)
GLUCOSE BLDC GLUCOMTR-MCNC: 302 MG/DL (ref 70–130)
GLUCOSE SERPL-MCNC: 176 MG/DL (ref 65–99)
HCT VFR BLD AUTO: 32.8 % (ref 34–46.6)
HGB BLD-MCNC: 10.3 G/DL (ref 12–15.9)
IMM GRANULOCYTES # BLD AUTO: 0.1 10*3/MM3 (ref 0–0.05)
IMM GRANULOCYTES NFR BLD AUTO: 1.6 % (ref 0–0.5)
LYMPHOCYTES # BLD AUTO: 2.12 10*3/MM3 (ref 0.7–3.1)
LYMPHOCYTES NFR BLD AUTO: 33.9 % (ref 19.6–45.3)
MCH RBC QN AUTO: 29 PG (ref 26.6–33)
MCHC RBC AUTO-ENTMCNC: 31.4 G/DL (ref 31.5–35.7)
MCV RBC AUTO: 92.4 FL (ref 79–97)
MONOCYTES # BLD AUTO: 0.37 10*3/MM3 (ref 0.1–0.9)
MONOCYTES NFR BLD AUTO: 5.9 % (ref 5–12)
NEUTROPHILS NFR BLD AUTO: 3.42 10*3/MM3 (ref 1.7–7)
NEUTROPHILS NFR BLD AUTO: 54.8 % (ref 42.7–76)
NRBC BLD AUTO-RTO: 0.5 /100 WBC (ref 0–0.2)
PLATELET # BLD AUTO: 218 10*3/MM3 (ref 140–450)
PMV BLD AUTO: 9.7 FL (ref 6–12)
POTASSIUM SERPL-SCNC: 4.7 MMOL/L (ref 3.5–5.2)
PROT SERPL-MCNC: 6.8 G/DL (ref 6–8.5)
RBC # BLD AUTO: 3.55 10*6/MM3 (ref 3.77–5.28)
SODIUM SERPL-SCNC: 135 MMOL/L (ref 136–145)
WBC # BLD AUTO: 6.25 10*3/MM3 (ref 3.4–10.8)

## 2021-06-30 PROCEDURE — 94760 N-INVAS EAR/PLS OXIMETRY 1: CPT

## 2021-06-30 PROCEDURE — 82962 GLUCOSE BLOOD TEST: CPT

## 2021-06-30 PROCEDURE — 99225 PR SBSQ OBSERVATION CARE/DAY 25 MINUTES: CPT | Performed by: STUDENT IN AN ORGANIZED HEALTH CARE EDUCATION/TRAINING PROGRAM

## 2021-06-30 PROCEDURE — 96372 THER/PROPH/DIAG INJ SC/IM: CPT

## 2021-06-30 PROCEDURE — G0378 HOSPITAL OBSERVATION PER HR: HCPCS

## 2021-06-30 PROCEDURE — 63710000001 INSULIN DETEMIR PER 5 UNITS: Performed by: STUDENT IN AN ORGANIZED HEALTH CARE EDUCATION/TRAINING PROGRAM

## 2021-06-30 PROCEDURE — 80053 COMPREHEN METABOLIC PANEL: CPT | Performed by: STUDENT IN AN ORGANIZED HEALTH CARE EDUCATION/TRAINING PROGRAM

## 2021-06-30 PROCEDURE — 63710000001 INSULIN ASPART PER 5 UNITS: Performed by: STUDENT IN AN ORGANIZED HEALTH CARE EDUCATION/TRAINING PROGRAM

## 2021-06-30 PROCEDURE — 96361 HYDRATE IV INFUSION ADD-ON: CPT

## 2021-06-30 PROCEDURE — 94799 UNLISTED PULMONARY SVC/PX: CPT

## 2021-06-30 PROCEDURE — 85025 COMPLETE CBC W/AUTO DIFF WBC: CPT | Performed by: STUDENT IN AN ORGANIZED HEALTH CARE EDUCATION/TRAINING PROGRAM

## 2021-06-30 PROCEDURE — 25010000002 HEPARIN (PORCINE) PER 1000 UNITS: Performed by: STUDENT IN AN ORGANIZED HEALTH CARE EDUCATION/TRAINING PROGRAM

## 2021-06-30 RX ORDER — METOLAZONE 5 MG/1
5 TABLET ORAL DAILY
Status: DISCONTINUED | OUTPATIENT
Start: 2021-07-01 | End: 2021-07-01 | Stop reason: HOSPADM

## 2021-06-30 RX ORDER — BUMETANIDE 1 MG/1
1 TABLET ORAL 2 TIMES DAILY
Status: DISCONTINUED | OUTPATIENT
Start: 2021-06-30 | End: 2021-07-01

## 2021-06-30 RX ADMIN — IPRATROPIUM BROMIDE AND ALBUTEROL SULFATE 3 ML: 2.5; .5 SOLUTION RESPIRATORY (INHALATION) at 14:19

## 2021-06-30 RX ADMIN — HEPARIN SODIUM 5000 UNITS: 5000 INJECTION INTRAVENOUS; SUBCUTANEOUS at 05:06

## 2021-06-30 RX ADMIN — METOLAZONE 10 MG: 5 TABLET ORAL at 08:17

## 2021-06-30 RX ADMIN — ATORVASTATIN CALCIUM 40 MG: 40 TABLET, FILM COATED ORAL at 21:42

## 2021-06-30 RX ADMIN — PANTOPRAZOLE SODIUM 40 MG: 40 TABLET, DELAYED RELEASE ORAL at 21:42

## 2021-06-30 RX ADMIN — GABAPENTIN 800 MG: 400 CAPSULE ORAL at 05:06

## 2021-06-30 RX ADMIN — INSULIN DETEMIR 45 UNITS: 100 INJECTION, SOLUTION SUBCUTANEOUS at 21:42

## 2021-06-30 RX ADMIN — INSULIN ASPART 16 UNITS: 100 INJECTION, SOLUTION INTRAVENOUS; SUBCUTANEOUS at 11:12

## 2021-06-30 RX ADMIN — SODIUM BICARBONATE 650 MG: 650 TABLET ORAL at 21:42

## 2021-06-30 RX ADMIN — BUMETANIDE 1 MG: 1 TABLET ORAL at 08:18

## 2021-06-30 RX ADMIN — INSULIN ASPART 8 UNITS: 100 INJECTION, SOLUTION INTRAVENOUS; SUBCUTANEOUS at 17:54

## 2021-06-30 RX ADMIN — OXYBUTYNIN CHLORIDE 5 MG: 5 TABLET, EXTENDED RELEASE ORAL at 08:18

## 2021-06-30 RX ADMIN — GABAPENTIN 800 MG: 400 CAPSULE ORAL at 13:20

## 2021-06-30 RX ADMIN — INSULIN ASPART 4 UNITS: 100 INJECTION, SOLUTION INTRAVENOUS; SUBCUTANEOUS at 08:18

## 2021-06-30 RX ADMIN — HEPARIN SODIUM 5000 UNITS: 5000 INJECTION INTRAVENOUS; SUBCUTANEOUS at 13:20

## 2021-06-30 RX ADMIN — SODIUM CHLORIDE, PRESERVATIVE FREE 10 ML: 5 INJECTION INTRAVENOUS at 21:50

## 2021-06-30 RX ADMIN — BUDESONIDE AND FORMOTEROL FUMARATE DIHYDRATE 2 PUFF: 160; 4.5 AEROSOL RESPIRATORY (INHALATION) at 20:14

## 2021-06-30 RX ADMIN — ASPIRIN 81 MG: 81 TABLET, FILM COATED ORAL at 08:17

## 2021-06-30 RX ADMIN — DULOXETINE HYDROCHLORIDE 20 MG: 20 CAPSULE, DELAYED RELEASE ORAL at 08:18

## 2021-06-30 RX ADMIN — FERROUS SULFATE TAB EC 324 MG (65 MG FE EQUIVALENT) 324 MG: 324 (65 FE) TABLET DELAYED RESPONSE at 08:17

## 2021-06-30 RX ADMIN — IPRATROPIUM BROMIDE AND ALBUTEROL SULFATE 3 ML: 2.5; .5 SOLUTION RESPIRATORY (INHALATION) at 11:02

## 2021-06-30 RX ADMIN — NYSTATIN: 100000 POWDER TOPICAL at 21:50

## 2021-06-30 RX ADMIN — HEPARIN SODIUM 5000 UNITS: 5000 INJECTION INTRAVENOUS; SUBCUTANEOUS at 21:42

## 2021-06-30 RX ADMIN — SODIUM CHLORIDE, PRESERVATIVE FREE 10 ML: 5 INJECTION INTRAVENOUS at 08:17

## 2021-06-30 RX ADMIN — INSULIN DETEMIR 45 UNITS: 100 INJECTION, SOLUTION SUBCUTANEOUS at 08:19

## 2021-06-30 RX ADMIN — SODIUM CHLORIDE 75 ML/HR: 9 INJECTION, SOLUTION INTRAVENOUS at 11:12

## 2021-06-30 RX ADMIN — CYCLOBENZAPRINE 10 MG: 10 TABLET, FILM COATED ORAL at 21:47

## 2021-06-30 RX ADMIN — IPRATROPIUM BROMIDE AND ALBUTEROL SULFATE 3 ML: 2.5; .5 SOLUTION RESPIRATORY (INHALATION) at 06:45

## 2021-06-30 RX ADMIN — GABAPENTIN 800 MG: 400 CAPSULE ORAL at 21:42

## 2021-06-30 RX ADMIN — METOPROLOL TARTRATE 100 MG: 50 TABLET, FILM COATED ORAL at 08:18

## 2021-06-30 RX ADMIN — INSULIN ASPART 12 UNITS: 100 INJECTION, SOLUTION INTRAVENOUS; SUBCUTANEOUS at 17:54

## 2021-06-30 RX ADMIN — BUMETANIDE 1 MG: 1 TABLET ORAL at 21:42

## 2021-06-30 RX ADMIN — NYSTATIN: 100000 POWDER TOPICAL at 08:18

## 2021-06-30 RX ADMIN — INSULIN ASPART 12 UNITS: 100 INJECTION, SOLUTION INTRAVENOUS; SUBCUTANEOUS at 08:18

## 2021-06-30 RX ADMIN — ISOSORBIDE MONONITRATE 30 MG: 30 TABLET, EXTENDED RELEASE ORAL at 08:17

## 2021-06-30 RX ADMIN — MONTELUKAST SODIUM 10 MG: 10 TABLET, COATED ORAL at 21:42

## 2021-06-30 RX ADMIN — METOPROLOL TARTRATE 100 MG: 50 TABLET, FILM COATED ORAL at 21:42

## 2021-06-30 RX ADMIN — HYDRALAZINE HYDROCHLORIDE 25 MG: 25 TABLET, FILM COATED ORAL at 19:00

## 2021-06-30 RX ADMIN — CLOPIDOGREL BISULFATE 75 MG: 75 TABLET ORAL at 08:18

## 2021-06-30 RX ADMIN — INSULIN ASPART 12 UNITS: 100 INJECTION, SOLUTION INTRAVENOUS; SUBCUTANEOUS at 11:12

## 2021-06-30 RX ADMIN — SODIUM BICARBONATE 650 MG: 650 TABLET ORAL at 08:17

## 2021-06-30 RX ADMIN — IPRATROPIUM BROMIDE AND ALBUTEROL SULFATE 3 ML: 2.5; .5 SOLUTION RESPIRATORY (INHALATION) at 20:14

## 2021-06-30 RX ADMIN — CETIRIZINE HYDROCHLORIDE 10 MG: 10 TABLET, FILM COATED ORAL at 08:17

## 2021-07-01 ENCOUNTER — READMISSION MANAGEMENT (OUTPATIENT)
Dept: CALL CENTER | Facility: HOSPITAL | Age: 61
End: 2021-07-01

## 2021-07-01 ENCOUNTER — HOME CARE VISIT (OUTPATIENT)
Dept: HOME HEALTH SERVICES | Facility: HOME HEALTHCARE | Age: 61
End: 2021-07-01

## 2021-07-01 VITALS
WEIGHT: 293 LBS | HEIGHT: 66 IN | BODY MASS INDEX: 47.09 KG/M2 | TEMPERATURE: 97.3 F | OXYGEN SATURATION: 94 % | RESPIRATION RATE: 20 BRPM | SYSTOLIC BLOOD PRESSURE: 136 MMHG | HEART RATE: 61 BPM | DIASTOLIC BLOOD PRESSURE: 93 MMHG

## 2021-07-01 LAB
ALBUMIN SERPL-MCNC: 3.3 G/DL (ref 3.5–5.2)
ALBUMIN/GLOB SERPL: 0.9 G/DL
ALP SERPL-CCNC: 144 U/L (ref 39–117)
ALT SERPL W P-5'-P-CCNC: 7 U/L (ref 1–33)
ANION GAP SERPL CALCULATED.3IONS-SCNC: 13 MMOL/L (ref 5–15)
AST SERPL-CCNC: 14 U/L (ref 1–32)
BASOPHILS # BLD AUTO: 0.07 10*3/MM3 (ref 0–0.2)
BASOPHILS NFR BLD AUTO: 1 % (ref 0–1.5)
BILIRUB SERPL-MCNC: 0.2 MG/DL (ref 0–1.2)
BUN SERPL-MCNC: 51 MG/DL (ref 8–23)
BUN/CREAT SERPL: 19.8 (ref 7–25)
CALCIUM SPEC-SCNC: 10.1 MG/DL (ref 8.6–10.5)
CHLORIDE SERPL-SCNC: 101 MMOL/L (ref 98–107)
CO2 SERPL-SCNC: 21 MMOL/L (ref 22–29)
CREAT SERPL-MCNC: 2.57 MG/DL (ref 0.57–1)
DEPRECATED RDW RBC AUTO: 54.1 FL (ref 37–54)
EOSINOPHIL # BLD AUTO: 0.23 10*3/MM3 (ref 0–0.4)
EOSINOPHIL NFR BLD AUTO: 3.4 % (ref 0.3–6.2)
ERYTHROCYTE [DISTWIDTH] IN BLOOD BY AUTOMATED COUNT: 16 % (ref 12.3–15.4)
GFR SERPL CREATININE-BSD FRML MDRD: 19 ML/MIN/1.73
GLOBULIN UR ELPH-MCNC: 3.7 GM/DL
GLUCOSE BLDC GLUCOMTR-MCNC: 221 MG/DL (ref 70–130)
GLUCOSE BLDC GLUCOMTR-MCNC: 259 MG/DL (ref 70–130)
GLUCOSE BLDC GLUCOMTR-MCNC: 273 MG/DL (ref 70–130)
GLUCOSE SERPL-MCNC: 232 MG/DL (ref 65–99)
HCT VFR BLD AUTO: 29.9 % (ref 34–46.6)
HGB BLD-MCNC: 9.3 G/DL (ref 12–15.9)
IMM GRANULOCYTES # BLD AUTO: 0.07 10*3/MM3 (ref 0–0.05)
IMM GRANULOCYTES NFR BLD AUTO: 1 % (ref 0–0.5)
LYMPHOCYTES # BLD AUTO: 2.31 10*3/MM3 (ref 0.7–3.1)
LYMPHOCYTES NFR BLD AUTO: 34 % (ref 19.6–45.3)
MCH RBC QN AUTO: 28.7 PG (ref 26.6–33)
MCHC RBC AUTO-ENTMCNC: 31.1 G/DL (ref 31.5–35.7)
MCV RBC AUTO: 92.3 FL (ref 79–97)
MONOCYTES # BLD AUTO: 0.47 10*3/MM3 (ref 0.1–0.9)
MONOCYTES NFR BLD AUTO: 6.9 % (ref 5–12)
NEUTROPHILS NFR BLD AUTO: 3.64 10*3/MM3 (ref 1.7–7)
NEUTROPHILS NFR BLD AUTO: 53.7 % (ref 42.7–76)
NRBC BLD AUTO-RTO: 0 /100 WBC (ref 0–0.2)
PLATELET # BLD AUTO: 250 10*3/MM3 (ref 140–450)
PMV BLD AUTO: 9.6 FL (ref 6–12)
POTASSIUM SERPL-SCNC: 5.1 MMOL/L (ref 3.5–5.2)
PROT SERPL-MCNC: 7 G/DL (ref 6–8.5)
RBC # BLD AUTO: 3.24 10*6/MM3 (ref 3.77–5.28)
SODIUM SERPL-SCNC: 135 MMOL/L (ref 136–145)
WBC # BLD AUTO: 6.79 10*3/MM3 (ref 3.4–10.8)

## 2021-07-01 PROCEDURE — 85025 COMPLETE CBC W/AUTO DIFF WBC: CPT | Performed by: STUDENT IN AN ORGANIZED HEALTH CARE EDUCATION/TRAINING PROGRAM

## 2021-07-01 PROCEDURE — 63710000001 INSULIN ASPART PER 5 UNITS: Performed by: STUDENT IN AN ORGANIZED HEALTH CARE EDUCATION/TRAINING PROGRAM

## 2021-07-01 PROCEDURE — 80053 COMPREHEN METABOLIC PANEL: CPT | Performed by: STUDENT IN AN ORGANIZED HEALTH CARE EDUCATION/TRAINING PROGRAM

## 2021-07-01 PROCEDURE — 99217 PR OBSERVATION CARE DISCHARGE MANAGEMENT: CPT | Performed by: STUDENT IN AN ORGANIZED HEALTH CARE EDUCATION/TRAINING PROGRAM

## 2021-07-01 PROCEDURE — 96372 THER/PROPH/DIAG INJ SC/IM: CPT

## 2021-07-01 PROCEDURE — 94664 DEMO&/EVAL PT USE INHALER: CPT

## 2021-07-01 PROCEDURE — 63710000001 INSULIN DETEMIR PER 5 UNITS: Performed by: STUDENT IN AN ORGANIZED HEALTH CARE EDUCATION/TRAINING PROGRAM

## 2021-07-01 PROCEDURE — 97162 PT EVAL MOD COMPLEX 30 MIN: CPT

## 2021-07-01 PROCEDURE — 82962 GLUCOSE BLOOD TEST: CPT

## 2021-07-01 PROCEDURE — G0378 HOSPITAL OBSERVATION PER HR: HCPCS

## 2021-07-01 PROCEDURE — 94799 UNLISTED PULMONARY SVC/PX: CPT

## 2021-07-01 PROCEDURE — 25010000002 HEPARIN (PORCINE) PER 1000 UNITS: Performed by: STUDENT IN AN ORGANIZED HEALTH CARE EDUCATION/TRAINING PROGRAM

## 2021-07-01 PROCEDURE — 94760 N-INVAS EAR/PLS OXIMETRY 1: CPT

## 2021-07-01 PROCEDURE — 97166 OT EVAL MOD COMPLEX 45 MIN: CPT

## 2021-07-01 RX ORDER — FUROSEMIDE 20 MG/1
20 TABLET ORAL DAILY
Qty: 30 TABLET | Refills: 0 | Status: SHIPPED | OUTPATIENT
Start: 2021-07-09 | End: 2021-07-08

## 2021-07-01 RX ORDER — FUROSEMIDE 40 MG/1
40 TABLET ORAL DAILY
Qty: 7 TABLET | Refills: 0 | Status: SHIPPED | OUTPATIENT
Start: 2021-07-01 | End: 2021-07-08

## 2021-07-01 RX ORDER — BUMETANIDE 0.25 MG/ML
1 INJECTION INTRAMUSCULAR; INTRAVENOUS EVERY 12 HOURS
Status: DISCONTINUED | OUTPATIENT
Start: 2021-07-01 | End: 2021-07-01 | Stop reason: HOSPADM

## 2021-07-01 RX ADMIN — ASPIRIN 81 MG: 81 TABLET, FILM COATED ORAL at 08:11

## 2021-07-01 RX ADMIN — NYSTATIN: 100000 POWDER TOPICAL at 15:05

## 2021-07-01 RX ADMIN — SODIUM BICARBONATE 650 MG: 650 TABLET ORAL at 08:17

## 2021-07-01 RX ADMIN — IPRATROPIUM BROMIDE AND ALBUTEROL SULFATE 3 ML: 2.5; .5 SOLUTION RESPIRATORY (INHALATION) at 14:13

## 2021-07-01 RX ADMIN — INSULIN ASPART 12 UNITS: 100 INJECTION, SOLUTION INTRAVENOUS; SUBCUTANEOUS at 11:39

## 2021-07-01 RX ADMIN — INSULIN ASPART 12 UNITS: 100 INJECTION, SOLUTION INTRAVENOUS; SUBCUTANEOUS at 11:38

## 2021-07-01 RX ADMIN — BUMETANIDE 1 MG: 1 TABLET ORAL at 08:11

## 2021-07-01 RX ADMIN — INSULIN DETEMIR 45 UNITS: 100 INJECTION, SOLUTION SUBCUTANEOUS at 08:15

## 2021-07-01 RX ADMIN — DULOXETINE HYDROCHLORIDE 20 MG: 20 CAPSULE, DELAYED RELEASE ORAL at 08:11

## 2021-07-01 RX ADMIN — NYSTATIN: 100000 POWDER TOPICAL at 08:17

## 2021-07-01 RX ADMIN — CLOPIDOGREL BISULFATE 75 MG: 75 TABLET ORAL at 08:11

## 2021-07-01 RX ADMIN — HEPARIN SODIUM 5000 UNITS: 5000 INJECTION INTRAVENOUS; SUBCUTANEOUS at 05:17

## 2021-07-01 RX ADMIN — OXYBUTYNIN CHLORIDE 5 MG: 5 TABLET, EXTENDED RELEASE ORAL at 08:11

## 2021-07-01 RX ADMIN — METOPROLOL TARTRATE 100 MG: 50 TABLET, FILM COATED ORAL at 08:11

## 2021-07-01 RX ADMIN — INSULIN ASPART 8 UNITS: 100 INJECTION, SOLUTION INTRAVENOUS; SUBCUTANEOUS at 08:12

## 2021-07-01 RX ADMIN — IPRATROPIUM BROMIDE AND ALBUTEROL SULFATE 3 ML: 2.5; .5 SOLUTION RESPIRATORY (INHALATION) at 06:40

## 2021-07-01 RX ADMIN — BUDESONIDE AND FORMOTEROL FUMARATE DIHYDRATE 2 PUFF: 160; 4.5 AEROSOL RESPIRATORY (INHALATION) at 06:42

## 2021-07-01 RX ADMIN — IPRATROPIUM BROMIDE AND ALBUTEROL SULFATE 3 ML: 2.5; .5 SOLUTION RESPIRATORY (INHALATION) at 11:21

## 2021-07-01 RX ADMIN — CETIRIZINE HYDROCHLORIDE 10 MG: 10 TABLET, FILM COATED ORAL at 08:11

## 2021-07-01 RX ADMIN — FERROUS SULFATE TAB EC 324 MG (65 MG FE EQUIVALENT) 324 MG: 324 (65 FE) TABLET DELAYED RESPONSE at 08:11

## 2021-07-01 RX ADMIN — METOLAZONE 5 MG: 5 TABLET ORAL at 08:15

## 2021-07-01 RX ADMIN — SODIUM CHLORIDE, PRESERVATIVE FREE 10 ML: 5 INJECTION INTRAVENOUS at 08:18

## 2021-07-01 RX ADMIN — ISOSORBIDE MONONITRATE 30 MG: 30 TABLET, EXTENDED RELEASE ORAL at 08:11

## 2021-07-01 RX ADMIN — GABAPENTIN 800 MG: 400 CAPSULE ORAL at 15:05

## 2021-07-01 RX ADMIN — HEPARIN SODIUM 5000 UNITS: 5000 INJECTION INTRAVENOUS; SUBCUTANEOUS at 15:05

## 2021-07-01 RX ADMIN — GABAPENTIN 800 MG: 400 CAPSULE ORAL at 05:17

## 2021-07-01 RX ADMIN — INSULIN ASPART 12 UNITS: 100 INJECTION, SOLUTION INTRAVENOUS; SUBCUTANEOUS at 08:12

## 2021-07-01 NOTE — DISCHARGE INSTR - APPOINTMENTS
Follow up with Nephrology 1 week    Dr. Lauren If you do not hear from the office tomorrow please call to schedule a appointment for 1 week.    [phone]

## 2021-07-01 NOTE — PLAN OF CARE
Goal Outcome Evaluation:  Plan of Care Reviewed With: patient        Progress: improving  Outcome Summary: VVS. Home CPAP used throughout night. No s/s of distress noted. WIll continue to monitor.

## 2021-07-01 NOTE — THERAPY EVALUATION
Patient Name: Yamileth Slater  : 1960    MRN: [id number]                              Today's Date: 2021       Admit Date: 2021    Visit Dx:     ICD-10-CM ICD-9-CM   1. Hypomagnesemia  E83.42 275.2   2. Chest pain, unspecified type  R07.9 786.50   3. Impaired mobility and activities of daily living  Z74.09 V49.89    Z78.9      Patient Active Problem List   Diagnosis   • Type 2 diabetes mellitus with diabetic polyneuropathy, with long-term current use of insulin (CMS/[facility])   • Chronic obstructive pulmonary disease (CMS/[facility])   • Chronic midline low back pain without sciatica   • Vitamin D deficiency   • Type 2 diabetes mellitus (CMS/[facility])   • Tobacco dependence syndrome   • Surgical follow-up care   • Shoulder joint pain   • Peripheral vascular disease (CMS/[facility])   • Pain   • Neurologic disorder associated with diabetes mellitus (CMS/[facility])   • Morbid obesity with BMI of 50.0-59.9, adult (CMS/[facility])   • Mixed hyperlipidemia   • Kidney stone   • History of colon polyps   • GERD (gastroesophageal reflux disease)   • Excoriated eczema   • Essential hypertension   • Encounter for medication refill   • Dyslipidemia   • Diabetes mellitus (CMS/[facility])   • Coronary artery disease   • Chronic obstructive lung disease (CMS/[facility])   • Chronic folliculitis   • Chest pain   • Mild persistent asthma   • 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   • Uncontrolled type 2 diabetes mellitus with complication, with long-term current use of insulin (CMS/[facility])   • Anemia   • Hypothyroidism   • Carotid artery disease (CMS/[facility])   • Current smoker   • DM type 2 with diabetic peripheral neuropathy (CMS/[facility])   • [place] abuse   • PAD (peripheral artery disease) (CMS/[facility])   • S/P CABG x 3   • Intercostal pain   • Venous insufficiency   • Dyspnea on exertion   • LAFB (left anterior fascicular block)   • Pulmonary hypertension (CMS/[facility])   • Left hip pain   • Neck pain   • Acute kidney injury superimposed on chronic kidney  disease (CMS/[facility])   • MIKE and COPD overlap syndrome (CMS/[facility])   • Pneumonia due to COVID-19 virus   • CKD (chronic kidney disease) stage 4, GFR 15-29 ml/min (CMS/[facility])   • Morbid obesity (CMS/[facility])   • Type 2 diabetes mellitus with hyperglycemia, with long-term current use of insulin (CMS/[facility])   • Hyponatremia   • Hyperkalemia   • Anemia   • Cutaneous candidiasis   • Community acquired pneumonia of right middle lobe of lung   • MRSA infection   • Alkaline phosphatase elevation   • COVID-19 ruled out by laboratory testing   • Esophageal dysphagia   • Monilial esophagitis (CMS/[facility])   • NSTEMI, initial episode of care (CMS/[facility])   • CAD (coronary artery disease)   • Gastrointestinal hemorrhage   • Chronic respiratory failure with hypoxia (CMS/[facility])   • Pneumonia due to infectious organism   • Chronic hypercapnic respiratory failure (CMS/[facility])     Past Medical History:   Diagnosis Date   • Anxiety    • Carotid artery stenosis    • Chronic obstructive lung disease (CMS/[facility])    • CKD (chronic kidney disease) stage 4, GFR 15-29 ml/min (CMS/[facility])    • Colonic polyp    • Coronary arteriosclerosis    • Diabetes mellitus (CMS/[facility])    • Diabetic neuropathy (CMS/[facility])    • GERD (gastroesophageal reflux disease)    • History of transfusion    • Hypercholesterolemia    • Hypertension    • Hypomagnesemia 6/27/2021    Monitor and replace   • Morbid obesity (CMS/[facility])    • Nephrolithiasis    • Peripheral vascular disease (CMS/[facility])    • Sleep apnea    • Substance abuse (CMS/[facility])    • Vitamin D deficiency      Past Surgical History:   Procedure Laterality Date   • CARDIAC CATHETERIZATION N/A 7/14/2020   • CARDIAC CATHETERIZATION N/A 4/23/2021    Procedure: Left Heart Cath;  Surgeon: Melba Romo MD;  Location: [facility] INVASIVE LOCATION;  Service: Cardiology;  Laterality: N/A;   • CARDIAC CATHETERIZATION N/A 4/30/2021    Procedure: Percutaneous Coronary Intervention;  Surgeon: Russell Voss MD;  Location: [facility]  INVASIVE LOCATION;  Service: Cardiovascular;  Laterality: N/A;   • CARDIAC CATHETERIZATION N/A 4/30/2021    Procedure: Stent NIKKI coronary;  Surgeon: Russell Voss MD;  Location: [facility] CATH INVASIVE LOCATION;  Service: Cardiovascular;  Laterality: N/A;   • CAROTID STENT Left    • COLONOSCOPY     • COLONOSCOPY N/A 5/14/2021    Procedure: COLONOSCOPY;  Surgeon: Mingo Duarte MD;  Location: [facility] ENDOSCOPY;  Service: Gastroenterology;  Laterality: N/A;   • CORONARY ARTERY BYPASS GRAFT N/A 2013    CABG X 3   • CYSTOSCOPY BLADDER STONE LITHOTRIPSY Bilateral    • ENDOSCOPY N/A 4/12/2021    Procedure: ESOPHAGOGASTRODUODENOSCOPY;  Surgeon: Mingo Duarte MD;  Location: [facility] ENDOSCOPY;  Service: Gastroenterology;  Laterality: N/A;   • ENDOSCOPY N/A 5/14/2021    Procedure: ESOPHAGOGASTRODUODENOSCOPY;  Surgeon: Mingo Duarte MD;  Location: [facility] ENDOSCOPY;  Service: Gastroenterology;  Laterality: N/A;     General Information     Row Name 07/01/21 1215          OT Time and Intention    Document Type  evaluation  -     Mode of Treatment  individual therapy;occupational therapy  -     Row Name 07/01/21 1215          General Information    Patient Profile Reviewed  yes  -     Prior Level of Function  independent:;all household mobility;bed mobility;transfer;mod assist:;ADL's  -     Existing Precautions/Restrictions  fall;oxygen therapy device and L/min  -     Row Name 07/01/21 1215          Living Environment    Lives With  spouse  -     Row Name 07/01/21 1215          Home Main Entrance    Number of Stairs, Main Entrance  none  -     Stair Railings, Main Entrance  none  -     Row Name 07/01/21 1215          Stairs Within Home, Primary    Stairs, Within Home, Primary  Tub/shower, grab bars, shower chair - Rollator,   -     Number of Stairs, Within Home, Primary  none  -     Stair Railings, Within Home, Primary  none  -     Row Name 07/01/21 1215          Cognition    Orientation  Status (Cognition)  oriented x 4  -     Row Name 07/01/21 1215          Safety Issues, Functional Mobility    Impairments Affecting Function (Mobility)  balance;endurance/activity tolerance;strength;pain  -       User Key  (r) = Recorded By, (t) = Taken By, (c) = Cosigned By    Initials Name Provider Type     Janusz Morris OT Occupational Therapist          Mobility/ADL's     Row Name 07/01/21 1215          Bed Mobility    Bed Mobility  supine-sit;sit-supine  -     Supine-Sit [place] (Bed Mobility)  modified independence  -     Sit-Supine [place] (Bed Mobility)  modified independence  -     Assistive Device (Bed Mobility)  bed rails;head of bed elevated  -[facility] Name 07/01/21 1215          Transfers    Transfers  sit-stand transfer  -     Sit-Stand [place] (Transfers)  contact guard  -[facility] Name 07/01/21 1215          Sit-Stand Transfer    Assistive Device (Sit-Stand Transfers)  walker, front-wheeled  -[facility] Name 07/01/21 1215          Functional Mobility    Functional Mobility- Ind. Level  contact guard assist  -     Functional Mobility-Distance (Feet)  16  -[facility] Name 07/01/21 1215          Activities of Daily Living    BADL Assessment/Intervention  lower body dressing;toileting;grooming  -JH     Row Name 07/01/21 1215          Lower Body Dressing Assessment/Training    [place] Level (Lower Body Dressing)  doff;don;maximum assist (25% patient effort)  -     Position (Lower Body Dressing)  edge of bed sitting  -[facility] Name 07/01/21 1215          Toileting Assessment/Training    [place] Level (Toileting)  toileting skills;contact guard assist  -     Position (Toileting)  supported sitting  -[facility] Name 07/01/21 1215          Grooming Assessment/Training    [place] Level (Grooming)  grooming skills;wash face, hands;supervision;set up  -     Position (Grooming)  sink side  -       User Key  (r) = Recorded By, (t) = Taken By, (c) =  Cosigned By    Initials Name Provider Type     Janusz Morris OT Occupational Therapist        Obj/Interventions     Row Name 07/01/21 1215          Sensory Assessment (Somatosensory)    Sensory Assessment (Somatosensory)  UE sensation intact;sensation intact  -JH     Row Name 07/01/21 1215          Vision Assessment/Intervention    Visual Impairment/Limitations  corrective lenses for reading  -JH     Row Name 07/01/21 1215          Range of Motion Comprehensive    General Range of Motion  no range of motion deficits identified  -JH     Row Name 07/01/21 1215          Strength Comprehensive (MMT)    Comment, General Manual Muscle Testing (MMT) Assessment  BUE MMT Strength 4-/5 Grossly  -       User Key  (r) = Recorded By, (t) = Taken By, (c) = Cosigned By    Initials Name Provider Type    Janusz Shankar OT Occupational Therapist        Goals/Plan     Row Name 07/01/21 1220          Bed Mobility Goal 1 (OT)    Activity/Assistive Device (Bed Mobility Goal 1, OT)  sit to supine/supine to sit  [facility]     [place] Level/Cues Needed (Bed Mobility Goal 1, OT)  modified independence  -     Time Frame (Bed Mobility Goal 1, OT)  long term goal (LTG);by discharge  -     Progress/Outcomes (Bed Mobility Goal 1, OT)  goal not met  -JH     Row Name 07/01/21 1220          Transfer Goal 1 (OT)    Activity/Assistive Device (Transfer Goal 1, OT)  sit-to-stand/stand-to-sit;toilet  -     [place] Level/Cues Needed (Transfer Goal 1, OT)  standby assist;verbal cues required;tactile cues required  -     Time Frame (Transfer Goal 1, OT)  long term goal (LTG);by discharge  -     Progress/Outcome (Transfer Goal 1, OT)  goal not met  -JH     Row Name 07/01/21 1220          Bathing Goal 1 (OT)    Activity/Device (Bathing Goal 1, OT)  lower body bathing  -     [place] Level/Cues Needed (Bathing Goal 1, OT)  verbal cues required;tactile cues required;contact guard assist  -     Time Frame (Bathing Goal 1, OT)  long  term goal (LTG);by discharge  -     Progress/Outcomes (Bathing Goal 1, OT)  goal not met  -JH     Row Name 07/01/21 1220          Dressing Goal 1 (OT)    Activity/Device (Dressing Goal 1, OT)  lower body dressing  -     [place]/Cues Needed (Dressing Goal 1, OT)  verbal cues required;tactile cues required;contact guard assist  -     Time Frame (Dressing Goal 1, OT)  long term goal (LTG);by discharge  -     Progress/Outcome (Dressing Goal 1, OT)  goal not met  -JH     Row Name 07/01/21 1220          Toileting Goal 1 (OT)    Activity/Device (Toileting Goal 1, OT)  toileting skills, all  -     [place] Level/Cues Needed (Toileting Goal 1, OT)  contact guard assist;verbal cues required;tactile cues required  -     Time Frame (Toileting Goal 1, OT)  long term goal (LTG);by discharge  -     Progress/Outcome (Toileting Goal 1, OT)  goal not met  -JH     Row Name 07/01/21 1220          Therapy Assessment/Plan (OT)    Planned Therapy Interventions (OT)  activity tolerance training;functional balance retraining;occupation/activity based interventions;ROM/therapeutic exercise;strengthening exercise;transfer/mobility retraining;patient/caregiver education/training;neuromuscular control/coordination retraining;BADL retraining  -       User Key  (r) = Recorded By, (t) = Taken By, (c) = Cosigned By    Initials Name Provider Type     Janusz Morris, SARA Occupational Therapist        Clinical Impression     Row Name 07/01/21 1215          Pain Assessment    Additional Documentation  Pain Scale: Numbers Pre/Post-Treatment (Group)  -JH     Row Name 07/01/21 1215          Pain Scale: Numbers Pre/Post-Treatment    Pretreatment Pain Rating  8/10  -     Posttreatment Pain Rating  0/10 - no pain  -     Pain Location - Orientation  lower  -     Pain Location  back  -[facility] 07/01/21 1215          Plan of Care Review    Plan of Care Reviewed With  patient  -     Outcome Summary  OT Eval completed on  this date - Pt pleasant and agreeable to therapy. Bed Mobility: Mod I Transfers: CGA with use of RW LBD: Max A Grooming: Supervision with set-up Toileting: CGA Funct Mob: CGA with use of RW. Pt demos decreased activity tolerance, decreased strength, decreased balance, and decreased safety awareness. Pt would benefit from continued skilled OT to address functional deficits. Recommend d/c home with OT with emphasis on EC techniques within home.  -     Row Name 07/01/21 1215          Therapy Assessment/Plan (OT)    Rehab Potential (OT)  good, to achieve stated therapy goals  -     Criteria for Skilled Therapeutic Interventions Met (OT)  yes;meets criteria;skilled treatment is necessary  -     Therapy Frequency (OT)  other (see comments) 5-7 days per week  -     Predicted Duration of Therapy Intervention (OT)  Until goals met or d/c  -     Row Name 07/01/21 1215          Therapy Plan Review/Discharge Plan (OT)    Anticipated Discharge Disposition (OT)  home with home health  -     Row Name 07/01/21 1215          Vital Signs    Pre Systolic BP Rehab  138  -     Pre Treatment Diastolic BP  63  -     Post Systolic BP Rehab  147  -JH     Post Treatment Diastolic BP  63  -     Pretreatment Heart Rate (beats/min)  60  -JH     Posttreatment Heart Rate (beats/min)  59  -     Pre SpO2 (%)  93  -     O2 Delivery Pre Treatment  nasal cannula  -     Post SpO2 (%)  97  -     O2 Delivery Post Treatment  nasal cannula  -     Pre Patient Position  Supine  -     Post Patient Position  Supine  -     Row Name 07/01/21 1215          Positioning and Restraints    Pre-Treatment Position  in bed  -     Post Treatment Position  bed  -     In Bed  supine;call light within reach;encouraged to call for assist;exit alarm on;side rails up x2;notified [facility]       User Key  (r) = Recorded By, (t) = Taken By, (c) = Cosigned By    Initials Name Provider Type     Janusz Morris, OT Occupational Therapist         Outcome Measures     Row Name 07/01/21 1220          How much help from another is currently needed...    Putting on and taking off regular lower body clothing?  2  -JH     Bathing (including washing, rinsing, and drying)  2  -JH     Toileting (which includes using toilet bed pan or urinal)  3  -JH     Putting on and taking off regular upper body clothing  3  -JH     Taking care of personal grooming (such as brushing teeth)  3  -JH     Eating meals  4  -JH     AM-PAC 6 Clicks Score (OT)  17  -JH     Row Name 07/01/21 1325          How much help from another person do you currently need...    Turning from your back to your side while in flat bed without using bedrails?  3  -CZ     Moving from lying on back to sitting on the side of a flat bed without bedrails?  3  -CZ     Moving to and from a bed to a chair (including a wheelchair)?  3  -CZ     Standing up from a chair using your arms (e.g., wheelchair, bedside chair)?  3  -CZ     Climbing 3-5 steps with a railing?  3  -CZ     To walk in hospital room?  3  -CZ     AM-PAC 6 Clicks Score (PT)  18  -CZ     Row Name 07/01/21 1325 07/01/21 1220       Functional Assessment    Outcome Measure Options  AM-PAC 6 Clicks Basic Mobility (PT);[place]  -CZ  AM-PAC 6 Clicks Daily Activity (OT)  -      User Key  (r) = Recorded By, (t) = Taken By, (c) = Cosigned By    Initials Name Provider Type    CZ Christopher Arnett, PT Physical Therapist    Janusz Shankar OT Occupational Therapist        Occupational Therapy Education                 Title: PT OT SLP Therapies (In Progress)     Topic: Occupational Therapy (In Progress)     Point: ADL training (Not Started)     Description:   Instruct learner(s) on proper safety adaptation and remediation techniques during self care or transfers.   Instruct in proper use of assistive devices.              Learner Progress:  Not documented in this visit.          Point: Home exercise program (Not Started)     Description:   Instruct  learner(s) on appropriate technique for monitoring, assisting and/or progressing therapeutic exercises/activities.              Learner Progress:  Not documented in this visit.          Point: Precautions (Done)     Description:   Instruct learner(s) on prescribed precautions during self-care and functional transfers.              Learning Progress Summary           Patient Acceptance, E, VU by  at 7/1/2021 [id number]    Comment: Pt educated on role of OT, d/c recs, and POC                   Point: Body mechanics (Not Started)     Description:   Instruct learner(s) on proper positioning and spine alignment during self-care, functional mobility activities and/or exercises.              Learner Progress:  Not documented in this visit.                      User Key     Initials Effective Dates Name Provider Type Discipline     06/16/21 -  Janusz Morris OT Occupational Therapist OT              OT Recommendation and Plan  Planned Therapy Interventions (OT): activity tolerance training, functional balance retraining, occupation/activity based interventions, ROM/therapeutic exercise, strengthening exercise, transfer/mobility retraining, patient/caregiver education/training, neuromuscular control/coordination retraining, BADL retraining  Therapy Frequency (OT): other (see comments) (5-7 days per week)  Plan of Care Review  Plan of Care Reviewed With: patient  Outcome Summary: OT Eval completed on this date - Pt pleasant and agreeable to therapy. Bed Mobility: Mod I Transfers: CGA with use of RW LBD: Max A Grooming: Supervision with set-up Toileting: CGA Funct Mob: CGA with use of RW. Pt demos decreased activity tolerance, decreased strength, decreased balance, and decreased safety awareness. Pt would benefit from continued skilled OT to address functional deficits. Recommend d/c home with HHOT with emphasis on EC techniques within home.     Time Calculation:   Time Calculation- OT     Row Name 07/01/21 1430             Time  Calculation- OT    OT Start Time  1215  -      OT Stop Time  1310  -      OT Time Calculation (min)  55 min  -         Untimed Charges    OT Eval/Re-eval Minutes  55  -JH         Total Minutes    Untimed Charges Total Minutes  55  -       Total Minutes  55  -JH        User Key  (r) = Recorded By, (t) = Taken By, (c) = Cosigned By    Initials Name Provider Type     Janusz Morris OT Occupational Therapist        Therapy Charges for Today     Code Description Service Date Service Provider Modifiers Qty    [id number]  OT EVAL MOD COMPLEXITY 4 7/1/2021 Janusz Morris OT GO 1               Janusz Morris OT  7/1/2021

## 2021-07-01 NOTE — PLAN OF CARE
Goal Outcome Evaluation:  Plan of Care Reviewed With: patient           Outcome Summary: Initial PT evaluation complete.  Patient is alert and cooperative.  She demonstrates (I) with bed mobility, requires SPV with transfers and gait, ambulating 20'x2 with FWW, slow nasir, cues for proper use of walker and to manage O2 tubing.  [place] assessed: 19/28 or moderate fall risk; recommend continued use of FWW.  Patient has 4WW at home, would benefit from HHPT to increase activity tolerance and lower fall risk.  PT evaluation only as patient already has d/c orders.

## 2021-07-01 NOTE — THERAPY DISCHARGE NOTE
Patient Name: Yamileth Slater  : 1960    MRN: [id number]                              Today's Date: 2021       Admit Date: 2021    Visit Dx:     ICD-10-CM ICD-9-CM   1. Hypomagnesemia  E83.42 275.2   2. Chest pain, unspecified type  R07.9 786.50   3. Impaired mobility and activities of daily living  Z74.09 V49.89    Z78.9    4. Impaired functional mobility, balance, gait, and endurance  Z74.09 V49.89     Patient Active Problem List   Diagnosis   • Type 2 diabetes mellitus with diabetic polyneuropathy, with long-term current use of insulin (CMS/[facility])   • Chronic obstructive pulmonary disease (CMS/[facility])   • Chronic midline low back pain without sciatica   • Vitamin D deficiency   • Type 2 diabetes mellitus (CMS/[facility])   • Tobacco dependence syndrome   • Surgical follow-up care   • Shoulder joint pain   • Peripheral vascular disease (CMS/[facility])   • Pain   • Neurologic disorder associated with diabetes mellitus (CMS/[facility])   • Morbid obesity with BMI of 50.0-59.9, adult (CMS/[facility])   • Mixed hyperlipidemia   • Kidney stone   • History of colon polyps   • GERD (gastroesophageal reflux disease)   • Excoriated eczema   • Essential hypertension   • Encounter for medication refill   • Dyslipidemia   • Diabetes mellitus (CMS/[facility])   • Coronary artery disease   • Chronic obstructive lung disease (CMS/[facility])   • Chronic folliculitis   • Chest pain   • Mild persistent asthma   • 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   • Uncontrolled type 2 diabetes mellitus with complication, with long-term current use of insulin (CMS/[facility])   • Anemia   • Hypothyroidism   • Carotid artery disease (CMS/[facility])   • Current smoker   • DM type 2 with diabetic peripheral neuropathy (CMS/[facility])   • [place] abuse   • PAD (peripheral artery disease) (CMS/[facility])   • S/P CABG x 3   • Intercostal pain   • Venous insufficiency   • Dyspnea on exertion   • LAFB (left anterior fascicular block)   • Pulmonary hypertension (CMS/[facility])   • Left  hip pain   • Neck pain   • Acute kidney injury superimposed on chronic kidney disease (CMS/[facility])   • MIKE and COPD overlap syndrome (CMS/[facility])   • Pneumonia due to COVID-19 virus   • CKD (chronic kidney disease) stage 4, GFR 15-29 ml/min (CMS/[facility])   • Morbid obesity (CMS/[facility])   • Type 2 diabetes mellitus with hyperglycemia, with long-term current use of insulin (CMS/[facility])   • Hyponatremia   • Hyperkalemia   • Anemia   • Cutaneous candidiasis   • Community acquired pneumonia of right middle lobe of lung   • MRSA infection   • Alkaline phosphatase elevation   • COVID-19 ruled out by laboratory testing   • Esophageal dysphagia   • Monilial esophagitis (CMS/[facility])   • NSTEMI, initial episode of care (CMS/[facility])   • CAD (coronary artery disease)   • Gastrointestinal hemorrhage   • Chronic respiratory failure with hypoxia (CMS/[facility])   • Pneumonia due to infectious organism   • Chronic hypercapnic respiratory failure (CMS/[facility])     Past Medical History:   Diagnosis Date   • Anxiety    • Carotid artery stenosis    • Chronic obstructive lung disease (CMS/[facility])    • CKD (chronic kidney disease) stage 4, GFR 15-29 ml/min (CMS/[facility])    • Colonic polyp    • Coronary arteriosclerosis    • Diabetes mellitus (CMS/[facility])    • Diabetic neuropathy (CMS/[facility])    • GERD (gastroesophageal reflux disease)    • History of transfusion    • Hypercholesterolemia    • Hypertension    • Hypomagnesemia 6/27/2021    Monitor and replace   • Morbid obesity (CMS/[facility])    • Nephrolithiasis    • Peripheral vascular disease (CMS/[facility])    • Sleep apnea    • Substance abuse (CMS/[facility])    • Vitamin D deficiency      Past Surgical History:   Procedure Laterality Date   • CARDIAC CATHETERIZATION N/A 7/14/2020   • CARDIAC CATHETERIZATION N/A 4/23/2021    Procedure: Left Heart Cath;  Surgeon: Melba Romo MD;  Location: [facility] CATH INVASIVE LOCATION;  Service: Cardiology;  Laterality: N/A;   • CARDIAC CATHETERIZATION N/A 4/30/2021    Procedure: Percutaneous Coronary  Intervention;  Surgeon: Russell Voss MD;  Location: [facility] CATH INVASIVE LOCATION;  Service: Cardiovascular;  Laterality: N/A;   • CARDIAC CATHETERIZATION N/A 4/30/2021    Procedure: Stent NIKKI coronary;  Surgeon: Russell Voss MD;  Location: [facility] CATH INVASIVE LOCATION;  Service: Cardiovascular;  Laterality: N/A;   • CAROTID STENT Left    • COLONOSCOPY     • COLONOSCOPY N/A 5/14/2021    Procedure: COLONOSCOPY;  Surgeon: Mingo Duarte MD;  Location: [facility] ENDOSCOPY;  Service: Gastroenterology;  Laterality: N/A;   • CORONARY ARTERY BYPASS GRAFT N/A 2013    CABG X 3   • CYSTOSCOPY BLADDER STONE LITHOTRIPSY Bilateral    • ENDOSCOPY N/A 4/12/2021    Procedure: ESOPHAGOGASTRODUODENOSCOPY;  Surgeon: Mingo Duarte MD;  Location: [facility] ENDOSCOPY;  Service: Gastroenterology;  Laterality: N/A;   • ENDOSCOPY N/A 5/14/2021    Procedure: ESOPHAGOGASTRODUODENOSCOPY;  Surgeon: Mingo Duarte MD;  Location: [facility] ENDOSCOPY;  Service: Gastroenterology;  Laterality: N/A;     General Information     Row Name 07/01/21 1325          Physical Therapy Time and Intention    Document Type  evaluation  -CZ     Mode of Treatment  individual therapy;physical therapy  -CZ     Row Name 07/01/21 1325          General Information    Patient Profile Reviewed  yes  -CZ     Prior Level of Function  independent:;all household mobility  -CZ     Existing Precautions/Restrictions  fall;oxygen therapy device and L/min  -CZ     Barriers to Rehab  medically complex  -CZ     Row Name 07/01/21 1325          Living Environment    Lives With  spouse  -CZ     Row Name 07/01/21 1325          Home Main Entrance    Number of Stairs, Main Entrance  none  -CZ     Stair Railings, Main Entrance  none  -CZ     Row Name 07/01/21 1325          Stairs Within Home, Primary    Stairs, Within Home, Primary  Ambulate with 4WW.  -CZ     Number of Stairs, Within Home, Primary  none  -CZ     Row Name 07/01/21 1325          Cognition     Orientation Status (Cognition)  oriented x 4  -CZ     Row Name 07/01/21 1325          Safety Issues, Functional Mobility    Impairments Affecting Function (Mobility)  balance;endurance/activity tolerance;strength;pain  -CZ       User Key  (r) = Recorded By, (t) = Taken By, (c) = Cosigned By    Initials Name Provider Type    CZ Christopher Arnett, PT Physical Therapist        Mobility     Row Name 07/01/21 1325          Bed Mobility    Bed Mobility  supine-sit;sit-supine  -CZ     Supine-Sit [place] (Bed Mobility)  modified independence  -CZ     Assistive Device (Bed Mobility)  head of bed elevated;bed rails  -CZ     Row Name 07/01/21 1325          Sit-Stand Transfer    Sit-Stand [place] (Transfers)  supervision  -CZ     Assistive Device (Sit-Stand Transfers)  walker, front-wheeled  -CZ     Row Name 07/01/21 1325          Gait/Stairs (Locomotion)    [place] Level (Gait)  supervision  -CZ     Assistive Device (Gait)  walker, front-wheeled  -CZ     Distance in Feet (Gait)  20'x2.  -CZ     Comment (Gait/Stairs)  Slow nasir, cues for proper use of walker.  -CZ       User Key  (r) = Recorded By, (t) = Taken By, (c) = Cosigned By    Initials Name Provider Type    CZ Christopher Arnett, PT Physical Therapist        Obj/Interventions     Row Name 07/01/21 1325          Range of Motion Comprehensive    General Range of Motion  bilateral lower extremity ROM WFL  -CZ     Comment, General Range of Motion  Except hip flexion limited by obese abdomen.  -CZ     Row Name 07/01/21 1325          Strength Comprehensive (MMT)    General Manual Muscle Testing (MMT) Assessment  other (see comments)  -CZ     Comment, General Manual Muscle Testing (MMT) Assessment  BLEs: 3+/5 grossly.  -CZ     Row Name 07/01/21 1325          Sensory Assessment (Somatosensory)    Sensory Assessment (Somatosensory)  other (see comments) Decreased light touch on L foot.  -CZ       User Key  (r) = Recorded By, (t) = Taken By, (c) = Cosigned By     Initials Name Provider Type    CZ Christopher Arnett, PT Physical Therapist        Goals/Plan    No documentation.       Clinical Impression     Row Name 07/01/21 1325          Pain    Additional Documentation  Pain Scale: Numbers Pre/Post-Treatment (Group)  -CZ     Row Name 07/01/21 1325          Pain Scale: Numbers Pre/Post-Treatment    Pretreatment Pain Rating  7/10  -CZ     Posttreatment Pain Rating  5/10  -CZ     Pain Location - Orientation  lower  -CZ     Pain Location  back  -CZ     Pain Intervention(s)  Repositioned;Ambulation/increased activity;Distraction  -CZ     Row Name 07/01/21 1325          Plan of Care Review    Plan of Care Reviewed With  patient  -CZ     Outcome Summary  Initial PT evaluation complete.  Patient is alert and cooperative.  She demonstrates (I) with bed mobility, requires SPV with transfers and gait, ambulating 20'x2 with FWW, slow nasir, cues for proper use of walker and to manage O2 tubing.  Tinetti assessed: 19/28 or moderate fall risk; recommend continued use of FWW.  Patient has 4WW at home, would benefit from HHPT to increase activity tolerance and lower fall risk.  PT evaluation only as patient already has d/c orders.  -CZ     Row Name 07/01/21 1325          Therapy Assessment/Plan (PT)    Rehab Potential (PT)  good, to achieve stated therapy goals  -CZ     Criteria for Skilled Interventions Met (PT)  yes;skilled treatment is necessary  -CZ     Predicted Duration of Therapy Intervention (PT)  Patient already has discharge orders.  -     Row Name 07/01/21 1325          Positioning and Restraints    Pre-Treatment Position  in bed  -CZ     Post Treatment Position  bed  -CZ     In Bed  supine;call light within reach;encouraged to call for assist;exit alarm on  -CZ       User Key  (r) = Recorded By, (t) = Taken By, (c) = Cosigned By    Initials Name Provider Type    CZ Christopher Arnett, PT Physical Therapist        Outcome Measures     Row Name 07/01/21 1325          How much help  "from another person do you currently need...    Turning from your back to your side while in flat bed without using bedrails?  3  -CZ     Moving from lying on back to sitting on the side of a flat bed without bedrails?  3  -CZ     Moving to and from a bed to a chair (including a wheelchair)?  3  -CZ     Standing up from a chair using your arms (e.g., wheelchair, bedside chair)?  3  -CZ     Climbing 3-5 steps with a railing?  3  -CZ     To walk in hospital room?  3  -CZ     AM-PAC 6 Clicks Score (PT)  18  -CZ     Row Name 07/01/21 1325          Tinetti Assessment    T[place]etti Assessment  yes  -CZ     Sitting Balance  1  -CZ     Arises  2  -CZ     Attempts to Rise  2  -CZ     Immediate Standing Balance (first 5 sec)  1  -CZ     Standing Balance  1  -CZ     Sternal Nudge (feet close together)  0  -CZ     Eyes Closed (feet close together)  1  -CZ     Turning 360 Degrees- Steps  1  -CZ     Turning 360 Degrees- Steadiness  1  -CZ     Sitting Down  1  -CZ     [place]etti Balance Score  11  -CZ     Gait Initiation (immediate after told \"go\")  1  -CZ     Step Length- Right Swing  1  -CZ     Step Length- Left Swing  1  -CZ     Foot Clearance- Right Foot  1  -CZ     Foot Clearance- Left Foot  1  -CZ     Step Symmetry  1  -CZ     Step Continuity  1  -CZ     Path (excursion)  1  -CZ     Trunk  0  -CZ     Base of Support  0  -CZ     Gait Score  8  -CZ     [place] Total Score  19  -CZ     [place]etti Assistive Device  rolling walker  -CZ     T[place]etti Assessment Comments  Poor stepping response to perturbation.  -CZ     Row Name 07/01/21 1325 07/01/21 1220       Functional Assessment    Outcome Measure Options  AM-PAC 6 Clicks Basic Mobility (PT);[place]  -CZ  AM-PAC 6 Clicks Daily Activity (OT)  -      User Key  (r) = Recorded By, (t) = Taken By, (c) = Cosigned By    Initials Name Provider Type    CZ Christopher Arnett, PT Physical Therapist    Janusz Shankar OT Occupational Therapist        Physical Therapy Education                 " Title: PT OT SLP Therapies (In Progress)     Topic: Physical Therapy (Done)     Point: Precautions (Done)     Learning Progress Summary           Patient Acceptance, E,TB, NR,VU by  at 7/1/2021 1502    Comment: [place] assessed: 19/28 or moderate fall risk; recommend continued use of FWw and HHPT upon discharge.                               User Key     Initials Effective Dates Name Provider Type Discipline     06/16/21 -  Christopher Arnett, PT Physical Therapist PT              PT Recommendation and Plan     Plan of Care Reviewed With: patient  Outcome Summary: Initial PT evaluation complete.  Patient is alert and cooperative.  She demonstrates (I) with bed mobility, requires SPV with transfers and gait, ambulating 20'x2 with FWW, slow nasir, cues for proper use of walker and to manage O2 tubing.  [place] assessed: 19/28 or moderate fall risk; recommend continued use of FWW.  Patient has 4WW at home, would benefit from HHPT to increase activity tolerance and lower fall risk.  PT evaluation only as patient already has d/c orders.     Time Calculation:   PT Charges     Row Name 07/01/21 1506             Time Calculation    Start Time  1325  -CZ      Stop Time  1406  -CZ      Time Calculation (min)  41 min  -CZ      PT Received On  07/01/21  -CZ      PT Goal Re-Cert Due Date  07/01/21  -CZ         Untimed Charges    PT Eval/Re-eval Minutes  41  -CZ         Total Minutes    Untimed Charges Total Minutes  41  -CZ       Total Minutes  41  -CZ        User Key  (r) = Recorded By, (t) = Taken By, (c) = Cosigned By    Initials Name Provider Type     Christopher Arnett, PT Physical Therapist        Therapy Charges for Today     Code Description Service Date Service Provider Modifiers Qty    51211740506 HC PT EVAL MOD COMPLEXITY 3 7/1/2021 Christopher Arnett, PT GP 1          PT G-Codes  Outcome Measure Options: AM-PAC 6 Clicks Basic Mobility (PT)[place]  AM-PAC 6 Clicks Score (PT): 18  AM-PAC 6 Clicks Score (OT):  17  T[place]etti Total Score: 19    PT Discharge Summary  Anticipated Discharge Disposition (PT): home with home health    Christopher Arnett, PT  7/1/2021

## 2021-07-01 NOTE — PLAN OF CARE
Goal Outcome Evaluation:      Pt has been resting comfortably in bed.no changes.will cont o monitor

## 2021-07-01 NOTE — OUTREACH NOTE
Prep Survey      Responses   [facility] patient discharged from?  [place]   Is LACE score < 7 ?  No   Emergency Room discharge w/ pulse ox?  No   Eligibility  [facility]   Date of Admission  06/27/21   Date of Discharge  07/01/21   Discharge Disposition  Home or Self Care   Discharge diagnosis  Acute kidney injury superimposed on chronic kidney disease    Does the patient have one of the following disease processes/diagnoses(primary or secondary)?  Other   Does the patient have Home health ordered?  Yes   What is the Home health agency?   pt active with [facility]   Is there a DME ordered?  No   Prep survey completed?  Yes          Danae Banerjee RN

## 2021-07-01 NOTE — PROGRESS NOTES
"[facility]  [address] [place] [address]. [address]  T - [phone]  F - [phone]     Progress Note          PATIENT  DEMOGRAPHICS   PATIENT NAME: Yamileth Slater                      PHYSICIAN: Timothy Valle MD  : 1960  MRN: [id number]   LOS: 0 days    Patient Care Team:  Rianna Macias MD as PCP - General (Family Medicine)  Subjective   SUBJECTIVE   No acute events overnight. Breathing is still labored. Edema is improving.          Objective   OBJECTIVE   Vital Signs  Temp:  [96 °F (35.6 °C)-97.3 °F (36.3 °C)] 97.3 °F (36.3 °C)  Heart Rate:  [51-63] 59  Resp:  [18-20] 18  BP: (136-174)/(67-93) 136/93    Flowsheet Rows      First Filed Value   Admission Height  167.6 cm (66\") Documented at 2021 170   Admission Weight  (!) 140 kg (309 lb) Documented at 2021 170           I/O last 3 completed shifts:  In: .3 [P.O.:240; I.V.:1961.3]  Out: 6450 [Urine:6450]    PHYSICAL EXAM    Physical Exam  Vitals and nursing note reviewed.   Constitutional:       Appearance: Normal appearance.   Cardiovascular:      Rate and Rhythm: Normal rate and regular rhythm.      Heart sounds: Normal heart sounds. No murmur heard.   No friction rub. No gallop.    Pulmonary:      Effort: Pulmonary effort is normal. No respiratory distress.      Breath sounds: Normal breath sounds. No stridor. No wheezing, rhonchi or rales.   Abdominal:      General: Bowel sounds are normal. There is no distension.      Palpations: Abdomen is soft.      Tenderness: There is no abdominal tenderness.   Musculoskeletal:         General: Swelling present.   Skin:     General: Skin is warm and dry.   Neurological:      Mental Status: She is alert.         RESULTS   Results Review:    Results from last 7 days   Lab Units 21  0527 21  0530 21  0545   SODIUM mmol/L 135* 135* 134*   POTASSIUM mmol/L 5.1 4.7 4.7   CHLORIDE mmol/L 101 101 102   CO2 mmol/L 21.0* 21.0* 21.0*   BUN mg/dL " 51* 46* 43*   CREATININE mg/dL 2.57* 2.64* 2.67*   CALCIUM mg/dL 10.1 9.5 9.0   BILIRUBIN mg/dL 0.2 0.2 0.2   ALK PHOS U/L 144* 134* 128*   ALT (SGPT) U/L 7 8 8   AST (SGOT) U/L 14 14 10   GLUCOSE mg/dL 232* 176* 231*       Estimated Creatinine Clearance: 33.1 mL/min (A) (by C-G formula based on SCr of 2.57 mg/dL (H)).    Results from last 7 days   Lab Units 06/28/21  0844 06/27/21  1703   MAGNESIUM mg/dL 2.3 1.2*             Results from last 7 days   Lab Units 07/01/21  0527 06/30/21  0530 06/29/21  0545 06/28/21  0844 06/27/21  1703   WBC 10*3/mm3 6.79 6.25 7.02 7.44 7.93   HEMOGLOBIN g/dL 9.3* 10.3* 9.6* 8.9* 9.0*   PLATELETS 10*3/mm3 250 218 231 226 234               Imaging Results (Last 24 Hours)     ** No results found for the last 24 hours. **           MEDICATIONS    aspirin, 81 mg, Oral, Daily  atorvastatin, 40 mg, Oral, Nightly  budesonide-formoterol, 2 puff, Inhalation, BID - RT  bumetanide, 1 mg, Oral, BID  cetirizine, 10 mg, Oral, Daily  clopidogrel, 75 mg, Oral, Daily  DULoxetine, 20 mg, Oral, Daily  ferrous sulfate, 324 mg, Oral, Daily With Breakfast  gabapentin, 800 mg, Oral, Q8H  heparin (porcine), 5,000 Units, Subcutaneous, Q8H  insulin aspart, 0-24 Units, Subcutaneous, TID AC  insulin aspart, 12 Units, Subcutaneous, TID With Meals  insulin detemir, 45 Units, Subcutaneous, Q12H  ipratropium-albuterol, 3 mL, Nebulization, 4x Daily - RT  isosorbide mononitrate, 30 mg, Oral, Daily  metOLazone, 5 mg, Oral, Daily  metoprolol tartrate, 100 mg, Oral, Q12H  montelukast, 10 mg, Oral, Nightly  nystatin, , Topical, TID  oxybutynin XL, 5 mg, Oral, Daily  pantoprazole, 40 mg, Oral, Nightly  sodium bicarbonate, 650 mg, Oral, BID  sodium chloride, 10 mL, Intravenous, Q12H           Assessment/Plan   ASSESSMENT / PLAN      Acute kidney injury superimposed on chronic kidney disease (CMS/HCC)    Morbid obesity with BMI of 50.0-59.9, adult (CMS/[facility])    Essential hypertension    Dyslipidemia    DM type 2 with  diabetic peripheral neuropathy (CMS/HCC)    MIKE and COPD overlap syndrome (CMS/HCC)    CAD (coronary artery disease)    Chronic respiratory failure with hypoxia (CMS/HCC)    1. Acute Kidney Failure on CKD 3: Baseline creatinine 1.9-2.2 mg/dl  - Creatinine is slightly better at 2.57 mg/dl. Urine output is good. Weight is down a kg. Keep current diuretics.      2. Chronic respiratory failure:  - Management per primary team     3. Hypertension:  - Blood pressure is acceptable.      4.  Diabetes type 2     5.  MIKE/COPD     6.  CAD    7. Anemia:  - Hemoglobin is acceptable at 9.3           This document has been electronically signed by Timothy Valle MD on July 1, 2021 11:08 CDT

## 2021-07-01 NOTE — PLAN OF CARE
Goal Outcome Evaluation:  Plan of Care Reviewed With: patient           Outcome Summary: OT Eval completed on this date - Pt pleasant and agreeable to therapy. Bed Mobility: Mod I Transfers: CGA with use of RW LBD: Max A Grooming: Supervision with set-up Toileting: CGA Funct Mob: CGA with use of RW. Pt demos decreased activity tolerance, decreased strength, decreased balance, and decreased safety awareness. Pt would benefit from continued skilled OT to address functional deficits. Recommend d/c home with HHOT with emphasis on EC techniques within home.

## 2021-07-01 NOTE — PROGRESS NOTES
FAMILY MEDICINE DAILY PROGRESS NOTE    NAME: Yamileth Slater  : 1960  MRN: [id number]      LOS: 0 days     PROVIDER OF SERVICE: Mone Us MD    Chief Complaint: Acute kidney injury superimposed on chronic kidney disease (CMS/HCC)    Subjective:     Interval History:  History taken from: patient  Patient resting comfortably. Used home cpap at night. Vital signs stable. Slight reduction in edema, but still present.     Review of Systems:   Review of Systems   Constitutional: Negative for activity change, appetite change, chills, fatigue and fever.   HENT: Negative for drooling, ear discharge, ear pain, facial swelling, hearing loss, mouth sores, rhinorrhea and sinus pain.    Eyes: Negative for pain, redness and itching.   Respiratory: Negative for cough, choking, chest tightness, shortness of breath and stridor.    Cardiovascular: Positive for leg swelling. Negative for chest pain and palpitations.   Gastrointestinal: Negative for abdominal distention, abdominal pain, anal bleeding, blood in stool, constipation, diarrhea and nausea.   Endocrine: Negative for heat intolerance, polydipsia and polyphagia.   Genitourinary: Negative for dysuria, flank pain, frequency and genital sores.   Musculoskeletal: Negative for back pain, gait problem, joint swelling and myalgias.   Skin: Negative for pallor and rash.   Neurological: Negative for seizures, facial asymmetry, speech difficulty, light-headedness, numbness and headaches.   Hematological: Negative for adenopathy. Does not bruise/bleed easily.   Psychiatric/Behavioral: Negative for confusion, decreased concentration, dysphoric mood and hallucinations. The patient is not nervous/anxious and is not hyperactive.        Objective:     Vital Signs  Temp:  [96 °F (35.6 °C)-97.9 °F (36.6 °C)] 96 °F (35.6 °C)  Heart Rate:  [51-62] 53  Resp:  [18-20] 18  BP: (145-174)/(67-79) 158/67  Flow (L/min):  [3] 3   Body mass index is 50.68 kg/m².    Physical  Exam  Physical Exam  Constitutional:       Appearance: She is well-developed.   HENT:      Head: Normocephalic and atraumatic.      Right Ear: External ear normal.      Left Ear: External ear normal.      Nose: Nose normal.   Eyes:      Conjunctiva/sclera: Conjunctivae normal.      Pupils: Pupils are equal, round, and reactive to light.   Cardiovascular:      Rate and Rhythm: Normal rate and regular rhythm.      Heart sounds: Normal heart sounds.   Pulmonary:      Effort: Pulmonary effort is normal.      Breath sounds: Normal breath sounds.   Abdominal:      General: Bowel sounds are normal.      Palpations: Abdomen is soft.   Musculoskeletal:         General: Normal range of motion.      Cervical back: Normal range of motion and neck supple.      Right lower leg: Edema present.      Left lower leg: Edema present.   Skin:     General: Skin is warm and dry.   Neurological:      Mental Status: She is alert and oriented to person, place, and time.   Psychiatric:         Behavior: Behavior normal.         Thought Content: Thought content normal.         Judgment: Judgment normal.         Scheduled Meds   aspirin, 81 mg, Oral, Daily  atorvastatin, 40 mg, Oral, Nightly  budesonide-formoterol, 2 puff, Inhalation, BID - RT  bumetanide, 1 mg, Oral, BID  cetirizine, 10 mg, Oral, Daily  clopidogrel, 75 mg, Oral, Daily  DULoxetine, 20 mg, Oral, Daily  ferrous sulfate, 324 mg, Oral, Daily With Breakfast  gabapentin, 800 mg, Oral, Q8H  heparin (porcine), 5,000 Units, Subcutaneous, Q8H  insulin aspart, 0-24 Units, Subcutaneous, TID AC  insulin aspart, 12 Units, Subcutaneous, TID With Meals  insulin detemir, 45 Units, Subcutaneous, Q12H  ipratropium-albuterol, 3 mL, Nebulization, 4x Daily - RT  isosorbide mononitrate, 30 mg, Oral, Daily  metOLazone, 5 mg, Oral, Daily  metoprolol tartrate, 100 mg, Oral, Q12H  montelukast, 10 mg, Oral, Nightly  nystatin, , Topical, TID  oxybutynin XL, 5 mg, Oral, Daily  pantoprazole, 40 mg, Oral,  Nightly  sodium bicarbonate, 650 mg, Oral, BID  sodium chloride, 10 mL, Intravenous, Q12H       PRN Meds   •  acetaminophen  •  cyclobenzaprine  •  dextrose  •  dextrose  •  glucagon (human recombinant)  •  hydrALAZINE  •  magnesium sulfate **OR** magnesium sulfate **OR** magnesium sulfate  •  ondansetron ODT  •  sodium chloride  •  sodium chloride      Diagnostic Data    Results from last 7 days   Lab Units 07/01/21  0527   WBC 10*3/mm3 6.79   HEMOGLOBIN g/dL 9.3*   HEMATOCRIT % 29.9*   PLATELETS 10*3/mm3 250   GLUCOSE mg/dL 232*   CREATININE mg/dL 2.57*   BUN mg/dL 51*   SODIUM mmol/L 135*   POTASSIUM mmol/L 5.1   AST (SGOT) U/L 14   ALT (SGPT) U/L 7   ALK PHOS U/L 144*   BILIRUBIN mg/dL 0.2   ANION GAP mmol/L 13.0       No radiology results for the last day      I reviewed the patient's new clinical results.  I reviewed the patient's new imaging results and agree with the interpretation.  I reviewed the patient's other test results and agree with the interpretation    Assessment/Plan:     Active Hospital Problems    Diagnosis  POA   • **Acute kidney injury superimposed on chronic kidney disease (CMS/[facility]) [N17.9, N18.9]  Yes     · Baseline creatinine 1.7 to 1.9 and GFR 25-30. Holding nephrotoxic agents such as patient's home lisinopril and Lasix.  · Currently receiving Bumex 1mg while on IV fluids of normal saline 75 cc an hour. Consulted nephrology, believe this represents close to her baseline renal function. They have discontinued her fluids and documented they plan to increase her diuretics.  · Trend creatinine  · Continue bicarb tablets       • Chronic respiratory failure with hypoxia (CMS/HCC) [J96.11]  Yes     · 3L nc O2 at home  · Home BiPAP could not be supplied, AutoBiPAP while admitted nightly  · Duo nebs every 4 hours  · Continue Symbicort  · Incentive spirometry     • CAD (coronary artery disease) [I25.10]  Yes     · S/P 3 stents 5/1/2021 for [facility]  · Continue ASA 81mg & Clopidogrel 75mg  · Continue  Atorvastatin 40mg     • MIKE and COPD overlap syndrome (CMS/[facility]) [G47.33, J44.9]  Yes     · Auto-BiPAP nightly      • DM type 2 with diabetic peripheral neuropathy (CMS/[facility]) [E11.42]  Yes     · Continue Levemir bid 45 units  · SSI  · Consistent carb diet  · POCT glucose  · Patient stated she was on Jardiance from last admission, not on med list, will start 10mg  · Continue Gabapentin 800mg tid     • Morbid obesity with BMI of 50.0-59.9, adult (CMS/[facility]) [E66.01, Z68.43]  Not Applicable     · -Body mass index is 49.87 kg/m².   · Consistent carbohydrate diet  · Pure wick     • Essential hypertension [I10]  Yes     · Continue Lopressor 100mg bid, medication states  but patient states she is currently taking  · PRN Hydralazine if needed  · Holding home lisinopril due to ERIKA on CKD stage IV     • Dyslipidemia [E78.5]  Yes     · Continue Atorvastatin 40mg daily         DVT prophylaxis: Lovenox  Code status is   Code Status and Medical Interventions:   Ordered at: 21 1909     Level Of Support Discussed With:    Patient     Code Status:    CPR     Medical Interventions (Level of Support Prior to Arrest):    Full       Plan for disposition:Where: home and When:  today           Mone Us M.D. PGY3  [facility]  [address] [place], [place] [address]  Office: [phone]    This document has been electronically signed by Mone Us MD on 2021 06:57 CDT

## 2021-07-01 NOTE — DISCHARGE PLACEMENT REQUEST
"Yamileth Slater (61 y.o. 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                 Active Insurance as of 6/27/2021     Primary Coverage     Payor Plan Insurance Group Employer/Plan Group    HUMANA MEDICARE REPLACEMENT HUMANA MEDICARE REPLACEMENT [id number]     Payor Plan Address Payor Plan Phone Number Payor Plan Fax Number Effective Dates    [address] [address] [phone]  7/1/2020 - None Entered    [facility] [address]       Subscriber Name Subscriber Birth Date Member ID       YAMILETH SLATER 1960 [id number]           Secondary Coverage     Payor Plan Insurance Group Employer/Plan Group    [place]      Payor Plan Address Payor Plan Phone Number Payor Plan Fax Number Effective Dates    [address] [phone]  6/28/2019 - None Entered    [facility] [id number]       Subscriber Name Subscriber Birth Date Member ID       YAMILETH SLATER 1960 [id number]                 Emergency Contacts      (Rel.) Home Phone Work Phone Mobile Phone    Huy Slater (Spouse) [phone] -- [phone]    Yamileth Chavez (Daughter) [phone] -- [phone]    Suzanne Rivera (Daughter) [phone] -- " [phone]            Insurance Information                HUMANA MEDICARE REPLACEMENT/HUMANA MEDICARE REPLACEMENT Phone: [phone]    Subscriber: Yamileth Slater Subscriber#: [id number]    Group#: [id number] Precert#:         [place] Phone: [phone]    Subscriber: Yamileth Slater Subscriber#: [id number]    Group#:  Precert#:

## 2021-07-02 ENCOUNTER — TRANSITIONAL CARE MANAGEMENT TELEPHONE ENCOUNTER (OUTPATIENT)
Dept: CALL CENTER | Facility: HOSPITAL | Age: 61
End: 2021-07-02

## 2021-07-02 NOTE — OUTREACH NOTE
Call Center TCM Note      Responses   [facility] patient discharged from?  [place]   Does the patient have one of the following disease processes/diagnoses(primary or secondary)?  Other   TCM attempt successful?  No   Unsuccessful attempts  Attempt 2          Jennifer Delgado RN    7/2/2021, 13:35 EDT

## 2021-07-02 NOTE — OUTREACH NOTE
Call Center TCM Note      Responses   [facility] patient discharged from?  [place]   Does the patient have one of the following disease processes/diagnoses(primary or secondary)?  Other   TCM attempt successful?  No [Verbal release out of date ]   Unsuccessful attempts  Attempt 1          Jennifer Delgado RN    7/2/2021, 12:22 EDT

## 2021-07-03 ENCOUNTER — TRANSITIONAL CARE MANAGEMENT TELEPHONE ENCOUNTER (OUTPATIENT)
Dept: CALL CENTER | Facility: HOSPITAL | Age: 61
End: 2021-07-03

## 2021-07-03 NOTE — DISCHARGE SUMMARY
DISCHARGE SUMMARY    PATIENT NAME: Yamileth Slater       PHYSICIAN: Paulina Solano MD  : 1960  MRN: [id number]    ADMITTED: 2021     DISCHARGED: 2021  5:36 PM    ADMISSION DIAGNOSES:  Active Hospital Problems    Diagnosis  POA   • **Acute kidney injury superimposed on chronic kidney disease (CMS/HCC) [N17.9, N18.9]  Yes   • Chronic respiratory failure with hypoxia (CMS/HCC) [J96.11]  Yes   • CAD (coronary artery disease) [I25.10]  Yes   • MIKE and COPD overlap syndrome (CMS/HCC) [G47.33, J44.9]  Yes   • DM type 2 with diabetic peripheral neuropathy (CMS/HCC) [E11.42]  Yes   • Morbid obesity with BMI of 50.0-59.9, adult (CMS/HCC) [E66.01, Z68.43]  Not Applicable   • Essential hypertension [I10]  Yes   • Dyslipidemia [E78.5]  Yes      Resolved Hospital Problems    Diagnosis Date Resolved POA   • Hypomagnesemia [E83.42] 2021 Yes     DISCHARGE DIAGNOSES:   Active Hospital Problems    Diagnosis  POA   • **Acute kidney injury superimposed on chronic kidney disease (CMS/HCC) [N17.9, N18.9]  Yes   • Chronic respiratory failure with hypoxia (CMS/HCC) [J96.11]  Yes   • CAD (coronary artery disease) [I25.10]  Yes   • MIKE and COPD overlap syndrome (CMS/HCC) [G47.33, J44.9]  Yes   • DM type 2 with diabetic peripheral neuropathy (CMS/HCC) [E11.42]  Yes   • Morbid obesity with BMI of 50.0-59.9, adult (CMS/HCC) [E66.01, Z68.43]  Not Applicable   • Essential hypertension [I10]  Yes   • Dyslipidemia [E78.5]  Yes      Resolved Hospital Problems    Diagnosis Date Resolved POA   • Hypomagnesemia [E83.42] 2021 Yes       SERVICE: Family Medicine Residency  Attending: Kit Brar MD  Resident: Paulina Solano MD    CONSULTS:   Consult Orders (all) (From admission, onward)     Start     Ordered    21 0845  Inpatient Nephrology Consult  Once     Specialty:  Nephrology  Provider:  Timothy Valle MD    21 0845    21 1823  Family Practice - Resident (on-call MD unless specified)   Once,   Status:  Canceled     Specialty:  Family Medicine  Provider:  Haily Mcneil MD    06/27/21 1828                PROCEDURES:   None    HISTORY OF PRESENT ILLNESS:   Taken from Dr. Mcneil's H&P dated 6/27/2021 1818 hrs.  Yamileth Slater is a 61 y.o. female with a PMH of uncontrolled diabetes with neuropathy, chronic respiratory failure with hypoxia, CAD, HTN, HLD, morbid obesity and obesity hypoventilation syndrome who presents with increased swelling over the last day. She states that she has noticed increased swelling in her right hand and legs over the last day. She states she has been taking her Lasix 20mg and she stays hydrated with water and Gatorade zero. She also states she has not seen Dr. Lauren in some time because when she has an appointment she comes back to the hospital. She denies chest pain, abdominal pain, dizziness/headache, SOB or dysuria. Patient also states she was started on Jardiance last admission but cannot remember the dose    DIAGNOSTIC DATA:   XR Chest 2 View    Result Date: 6/27/2021  CONCLUSION: Chronic obstructive pulmonary disease. Sternotomy. Cardiomegaly with minimal pulmonary vascular congestion and small right pleural effusion. [id number] Electronically signed by:  Jon Patricia MD  6/27/2021 5:44 PM CDT Workstation: [id number]    Lab Results (last 24 hours)     Procedure Component Value Units Date/Time    POC Glucose Once [[id number]]  (Abnormal) Collected: 07/01/21 1501    Specimen: Blood Updated: 07/01/21 1514     Glucose 259 mg/dL      Comment: RN NotifiedOperator: [id number] NISHI MAXMeter ID: [id number]       POC Glucose Once [[id number]]  (Abnormal) Collected: 07/01/21 1001    Specimen: Blood Updated: 07/01/21 1022     Glucose 273 mg/dL      Comment: RN NotifiedOperator: [id number] NISHI MAXMeter ID: [id number]       POC Glucose Once [[id number]]  (Abnormal) Collected: 07/01/21 0626    Specimen: Blood Updated: 07/01/21 0645     Glucose 221 mg/dL      Comment:  RN NotifiedOperator: [id number] TRACE Cruz ID: [id number]       Comprehensive Metabolic Panel [[id number]]  (Abnormal) Collected: 07/01/21 0527    Specimen: Blood Updated: 07/01/21 0639     Glucose 232 mg/dL      BUN 51 mg/dL      Creatinine 2.57 mg/dL      Sodium 135 mmol/L      Potassium 5.1 mmol/L      Comment: Slight hemolysis detected by analyzer. Results may be affected.        Chloride 101 mmol/L      CO2 21.0 mmol/L      Calcium 10.1 mg/dL      Total Protein 7.0 g/dL      Albumin 3.30 g/dL      ALT (SGPT) 7 U/L      AST (SGOT) 14 U/L      Alkaline Phosphatase 144 U/L      Total Bilirubin 0.2 mg/dL      eGFR Non African Amer 19 mL/min/1.73      Globulin 3.7 gm/dL      A/G Ratio 0.9 g/dL      BUN/Creatinine Ratio 19.8     Anion Gap 13.0 mmol/L     Narrative:      GFR Normal >60  Chronic Kidney Disease <60  Kidney Failure <15      CBC Auto Differential [[id number]]  (Abnormal) Collected: 07/01/21 0527    Specimen: Blood Updated: 07/01/21 0614     WBC 6.79 10*3/mm3      RBC 3.24 10*6/mm3      Hemoglobin 9.3 g/dL      Hematocrit 29.9 %      MCV 92.3 fL      MCH 28.7 pg      MCHC 31.1 g/dL      RDW 16.0 %      RDW-SD 54.1 fl      MPV 9.6 fL      Platelets 250 10*3/mm3      Neutrophil % 53.7 %      Lymphocyte % 34.0 %      Monocyte % 6.9 %      Eosinophil % 3.4 %      Basophil % 1.0 %      Immature Grans % 1.0 %      Neutrophils, Absolute 3.64 10*3/mm3      Lymphocytes, Absolute 2.31 10*3/mm3      Monocytes, Absolute 0.47 10*3/mm3      Eosinophils, Absolute 0.23 10*3/mm3      Basophils, Absolute 0.07 10*3/mm3      Immature Grans, Absolute 0.07 10*3/mm3      nRBC 0.0 /100 WBC            HOSPITAL COURSE:  Patient was given IV fluids and diuretics were changed from Lasix to Bumex to help improve ERIKA on CKD.  After several days with only modest improvement, nephro was consulted for help on patient's newly decreased renal function.  Dr. Aguirre, nephrologist determined this will be patient's new renal function  status.  Metolazone was decreased to 5 mg daily, Bumex was increased to 1 mg twice daily and fluid restriction to 1200 mL a day was initiated.  On hospital day 5, patient's BUN and creatinine has stabilized, patient was breathing well with her home CPAP, and patient was deemed safe from PT and OT to go home with continued PT OT care.  Patient already affiliated with home PT OT.  Patient was discharged on Lasix equivalent of 1 mg of Bumex-Lasix 40 mg daily x7 days, then switch to Lasix 20 mg daily.  Patient to follow-up with Dr. Aguirre and PCP in 1 week.    DISCHARGE CONDITION:   Stable    DISPOSITION:  Home or Self Care    DISCHARGE MEDICATIONS     Discharge Medications      Changes to Medications      Instructions Start Date   atorvastatin 40 MG tablet  Commonly known as: LIPITOR  What changed: when to take this   40 mg, Oral, Daily      furosemide 40 MG tablet  Commonly known as: Lasix  What changed:   · medication strength  · how much to take   Take 1 tablet by mouth Daily for 7 days.  **Finish this prescription first, then start 20 mg**      furosemide 20 MG tablet  Commonly known as: Lasix  What changed: You were already taking a medication with the same name, and this prescription was added. Make sure you understand how and when to take each.   Take 1 tablet by mouth Daily.  **Start after completing prescription for 40 mg**   Start Date: July 9, 2021     metoprolol tartrate 100 MG tablet  Commonly known as: LOPRESSOR  What changed: when to take this   100 mg, Oral, Every 12 Hours Scheduled         Continue These Medications      Instructions Start Date   Adult Aspirin Regimen 81 MG EC tablet  Generic drug: aspirin   81 mg, Oral, Daily      albuterol sulfate  (90 Base) MCG/ACT inhaler  Commonly known as: PROVENTIL HFA;VENTOLIN HFA;PROAIR HFA   2 puffs, Inhalation, Every 4 Hours PRN      budesonide-formoterol 160-4.5 MCG/ACT inhaler  Commonly known as: SYMBICORT   2 puffs, Inhalation, 2 Times Daily - RT       cetirizine 10 MG tablet  Commonly known as: zyrTEC   10 mg, Oral, Daily      clopidogrel 75 MG tablet  Commonly known as: PLAVIX   75 mg, Oral, Daily      [employer] Blood Glucose Meter w/Device kit   1 each, Does not apply, 3 Times Daily      cyclobenzaprine 10 MG tablet  Commonly known as: FLEXERIL   10 mg, Oral, 2 Times Daily PRN      DULoxetine 20 MG capsule  Commonly known as: Cymbalta   20 mg, Oral, Daily      Easy Touch Pen Needles 31G X 8 MM misc  Generic drug: Insulin Pen Needle   No dose, route, or frequency recorded.      NovoFine 30G X 8 MM misc  Generic drug: Insulin Pen Needle   As directed 4 times daily      ferrous sulfate 325 (65 FE) MG tablet  Commonly known as: FeroSul  Notes to patient: 07/02/2021   325 mg, Oral, Daily With Breakfast      FreeStyle Jade 2 [place] device   1 each, Does not apply, Continuous      FreeStyle Jade 2 Sensor misc   1 each, Does not apply, Every 14 Days      gabapentin 800 MG tablet  Commonly known as: NEURONTIN   800 mg, Oral, 3 Times Daily      glucose blood test strip   Use as instructed      insulin detemir 100 UNIT/ML injection  Commonly known as: LEVEMIR   45 Units, Subcutaneous, Every 12 Hours Scheduled      ipratropium-albuterol 0.5-2.5 mg/3 ml nebulizer  Commonly known as: DUO-NEB   3 mL, Nebulization, 4 Times Daily - RT      isosorbide mononitrate 30 MG 24 hr tablet  Commonly known as: IMDUR   30 mg, Oral, Daily      lidocaine 5 %  Commonly known as: LIDODERM   PLACE 1 PATCH ON THE SKIN AS DIRECTED BY PROVIDER DAILY. REMOVE & DISCARD PATCH WITHIN 12 HOURS OR AS DIRECTED BY MD      lisinopril 10 MG tablet  Commonly known as: PRINIVIL,ZESTRIL   10 mg, Oral, Daily      montelukast 10 MG tablet  Commonly known as: SINGULAIR   10 mg, Oral, Nightly      nitroglycerin 0.4 MG SL tablet  Commonly known as: NITROSTAT   0.4 mg, Sublingual, Every 5 Minutes PRN      NovoLOG FlexPen 100 UNIT/ML solution pen-injector sc pen  Generic drug: insulin aspart   20 Units, Subcutaneous,  3 Times Daily With Meals      nystatin 581988 UNIT/GM powder  Commonly known as: MYCOSTATIN   Topical, 3 Times Daily      ondansetron ODT 4 MG disintegrating tablet  Commonly known as: Zofran ODT   4 mg, Translingual, Every 8 Hours PRN      oxybutynin XL 5 MG 24 hr tablet  Commonly known as: DITROPAN-XL   5 mg, Oral, Daily      pantoprazole 40 MG EC tablet  Commonly known as: PROTONIX   40 mg, Oral, Nightly      sodium bicarbonate 650 MG tablet   650 mg, Oral, 2 Times Daily      sucralfate 1 g tablet  Commonly known as: CARAFATE   1 g, Oral, 4 Times Daily             INSTRUCTIONS:  Activity:   Activity Instructions     Activity as Tolerated          Diet:   Diet Instructions     Diet: Renal, Cardiac, Consistent Carbohydrate, Specialty Diet; Thin Liquids, No Restrictions; Low Sodium, Low Potassium      Discharge Diet:  Renal  Cardiac  Consistent Carbohydrate  Specialty Diet       Fluid Consistency: Thin Liquids, No Restrictions    Specialty Diets:  Low Sodium  Low Potassium             FOLLOW UP:   Additional Instructions for the Follow-ups that You Need to Schedule     Discharge Follow-up with PCP   As directed       Currently Documented PCP:    Rianna Macias MD    PCP Phone Number:    [phone]     Follow Up Details: in one week         Discharge Follow-up with Specified Provider: Dr Aguirre; 1 Week   As directed      To: Dr Aguirre    Follow Up: 1 Week            Contact information for follow-up providers     Rianna Macias MD .    Specialty: Family Medicine  Why: in one week  Contact information:  [address] [place] [address] [phone]             Rianna Macias MD .    Specialty: Family Medicine  Contact information:  [address] [place] [address] [phone]                   Contact information for after-discharge care     Home Medical Care     [facility] .    Service: Home Health Services  Contact information:  [address] [place]  [address]  [phone]                             PENDING TEST RESULTS AT DISCHARGE      Time: >30 minutes were spent in discharge planning, medication reconciliation and coordination of care for this patient.    Kit Brar MD is the attending at time of discharge, He is aware of the patient's status and agrees with the above discharge summary.      This document has been electronically signed by Paulina Solano MD on July 3, 2021 11:48 CDT    Paulina Solano MD PGY-3  Part of this note may be an electronic transcription/translation of spoken language to printed text using the Dragon Dictation System.

## 2021-07-03 NOTE — OUTREACH NOTE
Call Center TCM Note      Responses   [facility] patient discharged from?  [place]   Does the patient have one of the following disease processes/diagnoses(primary or secondary)?  Other   TCM attempt successful?  No [Verbal release out of date ]   Unsuccessful attempts  Attempt 3          Jennifer Delgado RN    7/3/2021, 09:03 EDT

## 2021-07-08 RX ORDER — FUROSEMIDE 20 MG/1
TABLET ORAL
Qty: 30 TABLET | Refills: 0 | Status: SHIPPED | OUTPATIENT
Start: 2021-07-08 | End: 2021-08-12 | Stop reason: HOSPADM

## 2021-07-08 RX ORDER — METOPROLOL TARTRATE 50 MG/1
50 TABLET, FILM COATED ORAL EVERY 12 HOURS SCHEDULED
Qty: 60 TABLET | Refills: 5 | OUTPATIENT
Start: 2021-07-08

## 2021-07-08 RX ORDER — DULOXETIN HYDROCHLORIDE 20 MG/1
20 CAPSULE, DELAYED RELEASE ORAL DAILY
Qty: 30 CAPSULE | Refills: 2 | Status: ON HOLD | OUTPATIENT
Start: 2021-07-08 | End: 2022-01-10

## 2021-07-11 ENCOUNTER — APPOINTMENT (OUTPATIENT)
Dept: GENERAL RADIOLOGY | Facility: HOSPITAL | Age: 61
End: 2021-07-11

## 2021-07-11 ENCOUNTER — HOSPITAL ENCOUNTER (EMERGENCY)
Facility: HOSPITAL | Age: 61
Discharge: HOME | End: 2021-07-11
Attending: EMERGENCY MEDICINE | Admitting: EMERGENCY MEDICINE

## 2021-07-11 VITALS
RESPIRATION RATE: 20 BRPM | TEMPERATURE: 98.4 F | DIASTOLIC BLOOD PRESSURE: 67 MMHG | BODY MASS INDEX: 44.71 KG/M2 | OXYGEN SATURATION: 97 % | HEART RATE: 81 BPM | WEIGHT: 277 LBS | SYSTOLIC BLOOD PRESSURE: 150 MMHG
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
## 2021-07-11 DIAGNOSIS — S91.115A LACERATION OF LESSER TOE OF LEFT FOOT WITHOUT FOREIGN BODY PRESENT OR DAMAGE TO NAIL, INITIAL ENCOUNTER: Primary | ICD-10-CM

## 2021-07-11 DIAGNOSIS — S50.02XA CONTUSION OF LEFT ELBOW, INITIAL ENCOUNTER: ICD-10-CM

## 2021-07-11 DIAGNOSIS — S80.01XA CONTUSION OF RIGHT KNEE, INITIAL ENCOUNTER: ICD-10-CM

## 2021-07-11 PROCEDURE — 73564 X-RAY EXAM KNEE 4 OR MORE: CPT

## 2021-07-11 PROCEDURE — 99282 EMERGENCY DEPT VISIT SF MDM: CPT

## 2021-07-11 PROCEDURE — 73630 X-RAY EXAM OF FOOT: CPT

## 2021-07-11 PROCEDURE — 73080 X-RAY EXAM OF ELBOW: CPT

## 2021-07-11 NOTE — DISCHARGE INSTRUCTIONS
Please return with new or worsening symptoms.  Keep wound clean and dry.  Follow-up with primary care.

## 2021-07-11 NOTE — ED PROVIDER NOTES
Subjective   61-year-old female was brought to the emergency department by family with concern for pain in her left elbow, right knee and left foot after fall from bed.  Reportedly she just rolled out of bed.  Denies hitting her head or loss of consciousness.  Laceration to the left fourth digit on the plantar surface.    Family history, surgical history, social history, current medications and allergies are reviewed with the patient and triage documentation and vitals are reviewed.      History provided by:  Patient   used: No        Review of Systems   Constitutional: Negative for chills and fever.   HENT: Negative.    Eyes: Negative for photophobia and visual disturbance.   Respiratory: Negative for cough, shortness of breath and wheezing.    Cardiovascular: Negative for chest pain, palpitations and leg swelling.   Gastrointestinal: Negative for abdominal pain, nausea and vomiting.   Endocrine: Negative.    Genitourinary: Negative.    Musculoskeletal: Positive for arthralgias. Negative for back pain, joint swelling, myalgias and neck pain.   Skin: Positive for wound. Negative for color change and rash.   Allergic/Immunologic: Negative.    Neurological: Negative for dizziness, weakness, light-headedness and headaches.   Hematological: Negative.    Psychiatric/Behavioral: Negative.        Past Medical History:   Diagnosis Date   • Anxiety    • Carotid artery stenosis    • Chronic obstructive lung disease (CMS/[facility])    • CKD (chronic kidney disease) stage 4, GFR 15-29 ml/min (CMS/[facility])    • Colonic polyp    • Coronary arteriosclerosis    • Diabetes mellitus (CMS/HCC)    • Diabetic neuropathy (CMS/HCC)    • GERD (gastroesophageal reflux disease)    • History of transfusion    • Hypercholesterolemia    • Hypertension    • Hypomagnesemia 6/27/2021    Monitor and replace   • Morbid obesity (CMS/[facility])    • Nephrolithiasis    • Peripheral vascular disease (CMS/[facility])    • Sleep apnea    • Substance abuse  (CMS/[facility])    • Vitamin D deficiency        Allergies   Allergen Reactions   • Adhesive Tape Hives   • Other      Bandaids, MRSA, SURECLOSE       Past Surgical History:   Procedure Laterality Date   • CARDIAC CATHETERIZATION N/A 7/14/2020   • CARDIAC CATHETERIZATION N/A 4/23/2021    Procedure: Left Heart Cath;  Surgeon: Melba Romo MD;  Location: [facility] CATH INVASIVE LOCATION;  Service: Cardiology;  Laterality: N/A;   • CARDIAC CATHETERIZATION N/A 4/30/2021    Procedure: Percutaneous Coronary Intervention;  Surgeon: Russell Voss MD;  Location: [facility] CATH INVASIVE LOCATION;  Service: Cardiovascular;  Laterality: N/A;   • CARDIAC CATHETERIZATION N/A 4/30/2021    Procedure: Stent NIKKI coronary;  Surgeon: Russell Voss MD;  Location: [facility] CATH INVASIVE LOCATION;  Service: Cardiovascular;  Laterality: N/A;   • CAROTID STENT Left    • COLONOSCOPY     • COLONOSCOPY N/A 5/14/2021    Procedure: COLONOSCOPY;  Surgeon: Mingo Duarte MD;  Location: [facility] ENDOSCOPY;  Service: Gastroenterology;  Laterality: N/A;   • CORONARY ARTERY BYPASS GRAFT N/A 2013    CABG X 3   • CYSTOSCOPY BLADDER STONE LITHOTRIPSY Bilateral    • ENDOSCOPY N/A 4/12/2021    Procedure: ESOPHAGOGASTRODUODENOSCOPY;  Surgeon: Mingo Duarte MD;  Location: [facility] ENDOSCOPY;  Service: Gastroenterology;  Laterality: N/A;   • ENDOSCOPY N/A 5/14/2021    Procedure: ESOPHAGOGASTRODUODENOSCOPY;  Surgeon: Mingo Duarte MD;  Location: [facility] ENDOSCOPY;  Service: Gastroenterology;  Laterality: N/A;       Family History   Problem Relation Age of Onset   • Heart disease Mother    • Heart disease Father    • Heart disease Sister    • Heart disease Brother        Social History     Socioeconomic History   • Marital status:      Spouse name: wesley dumont   • Number of children: Not on file   • Years of education: Not on file   • Highest education level: Not on file   Tobacco Use   • Smoking status: Light Tobacco Smoker      Packs/day: 0.25     Years: 46.00     Pack years: 11.50     Types: Cigarettes   • Smokeless tobacco: Never Used   • Tobacco comment: only smoking 5 a day    Substance and Sexual Activity   • Alcohol use: No   • Drug use: Not Currently     Types: LSD, Marijuana, Methamphetamines   • Sexual activity: Not Currently           Objective   Physical Exam  Vitals and nursing note reviewed.   Constitutional:       General: She is not in acute distress.     Appearance: Normal appearance. She is obese. She is not ill-appearing, toxic-appearing or diaphoretic.   HENT:      Head: Normocephalic and atraumatic.      Mouth/Throat:      Mouth: Mucous membranes are moist.      Pharynx: Oropharynx is clear.   Eyes:      Conjunctiva/sclera: Conjunctivae normal.      Pupils: Pupils are equal, round, and reactive to light.   Cardiovascular:      Rate and Rhythm: Normal rate and regular rhythm.      Pulses: Normal pulses.   Pulmonary:      Effort: Pulmonary effort is normal.      Breath sounds: Normal breath sounds.   Abdominal:      General: Bowel sounds are normal.      Tenderness: There is no abdominal tenderness. There is no guarding or rebound.   Musculoskeletal:         General: Tenderness present. No deformity or signs of injury.      Left elbow: No swelling, deformity or lacerations. Normal range of motion. Tenderness present in olecranon process.      Cervical back: Normal range of motion and neck supple.      Right knee: Ecchymosis and bony tenderness present. No swelling, deformity, effusion or lacerations. Tenderness present. Normal alignment. Normal pulse.      Left foot: Laceration and tenderness present. No swelling or deformity.        Feet:    Skin:     General: Skin is warm and dry.      Capillary Refill: Capillary refill takes less than 2 seconds.   Neurological:      General: No focal deficit present.      Mental Status: She is alert and oriented to person, place, and time.      Motor: No weakness.   Psychiatric:          Mood and Affect: Mood normal.         Behavior: Behavior normal.         Laceration Repair    Date/Time: 7/11/2021 7:03 AM  Performed by: Davy Ramires DO  Authorized by: Davy Ramires DO     Consent:     Consent obtained:  Verbal    Consent given by:  Patient    Risks discussed:  Infection and pain  Anesthesia (see MAR for exact dosages):     Anesthesia method:  None  Laceration details:     Length (cm):  1  Repair type:     Repair type:  Simple  Pre-procedure details:     Preparation:  Patient was prepped and draped in usual sterile fashion and imaging obtained to evaluate for foreign bodies  Exploration:     Hemostasis achieved with:  Direct pressure    Wound exploration: wound explored through full range of motion      Wound extent: no foreign bodies/material noted, no muscle damage noted, no nerve damage noted, no tendon damage noted, no underlying fracture noted and no vascular damage noted      Contaminated: no    Treatment:     Area cleansed with:  Saline and Hibiclens    Amount of cleaning:  Standard    Irrigation solution:  Sterile saline    Irrigation method:  Syringe  Skin repair:     Repair method:  Tissue adhesive  Approximation:     Approximation:  Close  Post-procedure details:     Dressing:  Open (no dressing)    Patient tolerance of procedure:  Tolerated well, no immediate complications               ED Course    Labs Reviewed - No data to display  XR Chest 2 View    Result Date: 6/27/2021  Narrative: TWO VIEW CHEST HISTORY: Shortness of breath. Shortness of air Frontal and lateral films of the chest were obtained. COMPARISON: June 2, 2021 FINDINGS:  EKG leads. Chronic obstructive pulmonary disease. Sternotomy. Cardiomegaly with minimal pulmonary vascular congestion and small right pleural effusion. No pneumothorax. No acute osseous abnormality.     Impression: CONCLUSION: Chronic obstructive pulmonary disease. Sternotomy. Cardiomegaly with minimal pulmonary vascular congestion and  small right pleural effusion. [id number] Electronically signed by:  Jon Patricia MD  6/27/2021 5:44 PM CDT Workstation: [id number]    XR Elbow 3+ View Left    Result Date: 7/11/2021  Narrative: EXAM: XR ELBOW 3 VIEWS COMPARISON: None INDICATION: fall out of bed, pain FINDINGS: 3 view left elbow. No acute fracture or dislocation. No suspicious osseous lesion. Joint spaces are preserved. Soft tissues are unremarkable. No elevation of fat pads to suggest effusion.     Impression: No acute osseous abnormality. Electronically signed by:  Kamar Khanna MD  7/11/2021 7:07 AM CDT Workstation: [id number]    XR Knee 4+ View Right    Result Date: 7/11/2021  Narrative: EXAM: XR KNEE 4 OR MORE VIEWS COMPARISON: None INDICATION: fall out of bed, pain FINDINGS: 4 view right knee. No acute fracture or dislocation. No suspicious osseous lesion. Slight medial compartment joint space narrowing. Small marginal osteophyte of the lateral femoral condyle tibial plateau and medial tibial plateau. Small patellar osteophytes. Thin curvilinear radiodensity is seen in the medial and lateral compartments. Patient status post vascular stenting of the atherosclerotic right lower extremity vasculature.     Impression: No acute osseous abnormality. Tricompartmental osteoarthritis and chondrocalcinosis. Electronically signed by:  Kamar Khanna MD  7/11/2021 7:07 AM CDT Workstation: [id number]    XR Foot 3+ View Left    Result Date: 7/11/2021  Narrative: EXAM: XR FOOT 3 OR MORE VIEWS COMPARISON: None INDICATION: fall out of bed, pain, laceration FINDINGS: 3 view left foot. No acute fracture or dislocation. No suspicious osseous lesion. Joint spaces are preserved. Hallux valgus deformity with osseous protuberance at the medial aspect of the first metatarsal head. Prominent plantar calcaneal spur. Soft tissues are unremarkable.     Impression: No acute osseous abnormality. Hallux valgus deformity with bunion. Prominent plantar calcaneal spur  can be source of chronic heel pain/plantar fasciitis.  Electronically signed by:  Kamar Khanna MD  7/11/2021 7:09 AM CDT Workstation: [id number]            MDM  Number of Diagnoses or Management Options     Amount and/or Complexity of Data Reviewed  Tests in the radiology section of CPT®: reviewed    Patient Progress  Patient progress: stable    Laceration covered with glue and steri strips. Xray imaging without osseous abnormality. Patient advised on symptomatic treatment and wound care and agreeable to discharge.      Final diagnoses:   Laceration of lesser toe of left foot without foreign body present or damage to nail, initial encounter   Fall from bed, initial encounter   Contusion of left elbow, initial encounter   Contusion of right knee, initial encounter       ED Disposition  ED Disposition     ED Disposition Condition Comment    Discharge Stable           Rianna Macias MD  [facility] [address] [place] [address] [phone]               Medication List      Changed    atorvastatin 40 MG tablet  Commonly known as: LIPITOR  Take 1 tablet by mouth Daily.  What changed: when to take this     metoprolol tartrate 100 MG tablet  Commonly known as: LOPRESSOR  Take 1 tablet by mouth Every 12 (Twelve) Hours for 30 days.  What changed: when to take this             Davy Ramires DO  07/12/21 1416

## 2021-07-11 NOTE — ED NOTES
Skin Affix and steri strips applied to laceration per provider order.     Keena Ash RN  07/11/21 6267

## 2021-07-11 NOTE — ED NOTES
Lac area cleaned with hibiclens and rinsed with NS. No bleeding noted. Pt tolerated without difficulty.      Jose Park RN  07/11/21 6879

## 2021-07-11 NOTE — ED NOTES
Pt assisted to bathroom via wheelchair and back to room. Pt tolerated without difficulty.      Jose Park RN  07/11/21 0610

## 2021-07-13 ENCOUNTER — READMISSION MANAGEMENT (OUTPATIENT)
Dept: CALL CENTER | Facility: HOSPITAL | Age: 61
End: 2021-07-13

## 2021-07-13 ENCOUNTER — HOME CARE VISIT (OUTPATIENT)
Dept: HOME HEALTH SERVICES | Facility: CLINIC | Age: 61
End: 2021-07-13

## 2021-07-13 VITALS
SYSTOLIC BLOOD PRESSURE: 110 MMHG | DIASTOLIC BLOOD PRESSURE: 74 MMHG | TEMPERATURE: 97.2 F | OXYGEN SATURATION: 99 % | HEART RATE: 68 BPM | RESPIRATION RATE: 18 BRPM

## 2021-07-13 PROCEDURE — G0493 RN CARE EA 15 MIN HH/HOSPICE: HCPCS

## 2021-07-13 NOTE — OUTREACH NOTE
Medical Week 2 Survey      Responses   [facility] patient discharged from?  [facility]   Does the patient have one of the following disease processes/diagnoses(primary or secondary)?  Other   Week 2 attempt successful?  No   Unsuccessful attempts  Attempt 2   Call end time  1520   Week 2 Call Completed?  Yes   Is the patient interested in additional calls from an ambulatory ?  NOTE:  applies to high risk patients requiring additional follow-up.  No   Revoked  No further contact(revokes)-requires comment   Graduated/Revoked comments  UTR x 4          Jaja Sawyer RN

## 2021-07-30 ENCOUNTER — APPOINTMENT (OUTPATIENT)
Dept: GENERAL RADIOLOGY | Facility: HOSPITAL | Age: 61
End: 2021-07-30

## 2021-07-30 ENCOUNTER — APPOINTMENT (OUTPATIENT)
Dept: ULTRASOUND IMAGING | Facility: HOSPITAL | Age: 61
End: 2021-07-30

## 2021-07-30 ENCOUNTER — HOSPITAL ENCOUNTER (INPATIENT)
Facility: HOSPITAL | Age: 61
LOS: 9 days | Discharge: HOME HEALTH | End: 2021-08-12
Attending: EMERGENCY MEDICINE | Admitting: INTERNAL MEDICINE

## 2021-07-30 DIAGNOSIS — D64.9 ANEMIA, UNSPECIFIED TYPE: ICD-10-CM

## 2021-07-30 DIAGNOSIS — N18.9 CHRONIC KIDNEY DISEASE, UNSPECIFIED CKD STAGE: ICD-10-CM

## 2021-07-30 DIAGNOSIS — Z78.9 IMPAIRED MOBILITY AND ADLS: ICD-10-CM

## 2021-07-30 DIAGNOSIS — E11.65 TYPE 2 DIABETES MELLITUS WITH HYPERGLYCEMIA, UNSPECIFIED WHETHER LONG TERM INSULIN USE (HCC): ICD-10-CM

## 2021-07-30 DIAGNOSIS — L03.116 CELLULITIS OF LEFT LEG: Primary | ICD-10-CM

## 2021-07-30 DIAGNOSIS — Z74.09 IMPAIRED MOBILITY AND ADLS: ICD-10-CM

## 2021-07-30 LAB
ALBUMIN SERPL-MCNC: 3 G/DL (ref 3.5–5.2)
ALBUMIN/GLOB SERPL: 0.6 G/DL
ALP SERPL-CCNC: 206 U/L (ref 39–117)
ALT SERPL W P-5'-P-CCNC: 12 U/L (ref 1–33)
ANION GAP SERPL CALCULATED.3IONS-SCNC: 9 MMOL/L (ref 5–15)
AST SERPL-CCNC: 19 U/L (ref 1–32)
BACTERIA UR QL AUTO: ABNORMAL /HPF
BASOPHILS # BLD AUTO: 0.05 10*3/MM3 (ref 0–0.2)
BASOPHILS NFR BLD AUTO: 0.5 % (ref 0–1.5)
BILIRUB SERPL-MCNC: 0.2 MG/DL (ref 0–1.2)
BILIRUB UR QL STRIP: NEGATIVE
BUN SERPL-MCNC: 41 MG/DL (ref 8–23)
BUN/CREAT SERPL: 19.7 (ref 7–25)
CALCIUM SPEC-SCNC: 8.7 MG/DL (ref 8.6–10.5)
CHLORIDE SERPL-SCNC: 94 MMOL/L (ref 98–107)
CLARITY UR: CLEAR
CO2 SERPL-SCNC: 25 MMOL/L (ref 22–29)
COLOR UR: YELLOW
CREAT SERPL-MCNC: 2.08 MG/DL (ref 0.57–1)
D-LACTATE SERPL-SCNC: 0.8 MMOL/L (ref 0.5–2)
DEPRECATED RDW RBC AUTO: 48.9 FL (ref 37–54)
EOSINOPHIL # BLD AUTO: 0.19 10*3/MM3 (ref 0–0.4)
EOSINOPHIL NFR BLD AUTO: 2.1 % (ref 0.3–6.2)
ERYTHROCYTE [DISTWIDTH] IN BLOOD BY AUTOMATED COUNT: 15.2 % (ref 12.3–15.4)
FLUAV RNA RESP QL NAA+PROBE: NOT DETECTED
FLUBV RNA RESP QL NAA+PROBE: NOT DETECTED
GFR SERPL CREATININE-BSD FRML MDRD: 24 ML/MIN/1.73
GLOBULIN UR ELPH-MCNC: 4.9 GM/DL
GLUCOSE BLDC GLUCOMTR-MCNC: 286 MG/DL (ref 70–130)
GLUCOSE SERPL-MCNC: 305 MG/DL (ref 65–99)
GLUCOSE UR STRIP-MCNC: ABNORMAL MG/DL
HCT VFR BLD AUTO: 26.3 % (ref 34–46.6)
HGB BLD-MCNC: 8.2 G/DL (ref 12–15.9)
HGB UR QL STRIP.AUTO: ABNORMAL
HYALINE CASTS UR QL AUTO: ABNORMAL /LPF
IMM GRANULOCYTES # BLD AUTO: 0.14 10*3/MM3 (ref 0–0.05)
IMM GRANULOCYTES NFR BLD AUTO: 1.5 % (ref 0–0.5)
KETONES UR QL STRIP: NEGATIVE
LEUKOCYTE ESTERASE UR QL STRIP.AUTO: NEGATIVE
LYMPHOCYTES # BLD AUTO: 1.88 10*3/MM3 (ref 0.7–3.1)
LYMPHOCYTES NFR BLD AUTO: 20.6 % (ref 19.6–45.3)
MCH RBC QN AUTO: 27.4 PG (ref 26.6–33)
MCHC RBC AUTO-ENTMCNC: 31.2 G/DL (ref 31.5–35.7)
MCV RBC AUTO: 88 FL (ref 79–97)
MONOCYTES # BLD AUTO: 0.38 10*3/MM3 (ref 0.1–0.9)
MONOCYTES NFR BLD AUTO: 4.2 % (ref 5–12)
NEUTROPHILS NFR BLD AUTO: 6.47 10*3/MM3 (ref 1.7–7)
NEUTROPHILS NFR BLD AUTO: 71.1 % (ref 42.7–76)
NITRITE UR QL STRIP: NEGATIVE
NRBC BLD AUTO-RTO: 0 /100 WBC (ref 0–0.2)
NT-PROBNP SERPL-MCNC: 2090 PG/ML (ref 0–900)
PH UR STRIP.AUTO: 6.5 [PH] (ref 5–9)
PLATELET # BLD AUTO: 340 10*3/MM3 (ref 140–450)
PMV BLD AUTO: 8.6 FL (ref 6–12)
POTASSIUM SERPL-SCNC: 3.7 MMOL/L (ref 3.5–5.2)
PROT SERPL-MCNC: 7.9 G/DL (ref 6–8.5)
PROT UR QL STRIP: ABNORMAL
RBC # BLD AUTO: 2.99 10*6/MM3 (ref 3.77–5.28)
RBC # UR: ABNORMAL /HPF
REF LAB TEST METHOD: ABNORMAL
SARS-COV-2 RNA RESP QL NAA+PROBE: NOT DETECTED
SODIUM SERPL-SCNC: 128 MMOL/L (ref 136–145)
SP GR UR STRIP: 1.02 (ref 1–1.03)
SQUAMOUS #/AREA URNS HPF: ABNORMAL /HPF
TROPONIN T SERPL-MCNC: 0.02 NG/ML (ref 0–0.03)
TROPONIN T SERPL-MCNC: 0.03 NG/ML (ref 0–0.03)
UROBILINOGEN UR QL STRIP: ABNORMAL
WBC # BLD AUTO: 9.11 10*3/MM3 (ref 3.4–10.8)
WBC UR QL AUTO: ABNORMAL /HPF

## 2021-07-30 PROCEDURE — 93005 ELECTROCARDIOGRAM TRACING: CPT | Performed by: EMERGENCY MEDICINE

## 2021-07-30 PROCEDURE — 93010 ELECTROCARDIOGRAM REPORT: CPT | Performed by: INTERNAL MEDICINE

## 2021-07-30 PROCEDURE — 93971 EXTREMITY STUDY: CPT

## 2021-07-30 PROCEDURE — G0378 HOSPITAL OBSERVATION PER HR: HCPCS

## 2021-07-30 PROCEDURE — 63710000001 INSULIN REGULAR HUMAN PER 5 UNITS: Performed by: EMERGENCY MEDICINE

## 2021-07-30 PROCEDURE — 87636 SARSCOV2 & INF A&B AMP PRB: CPT | Performed by: EMERGENCY MEDICINE

## 2021-07-30 PROCEDURE — 99284 EMERGENCY DEPT VISIT MOD MDM: CPT

## 2021-07-30 PROCEDURE — 81001 URINALYSIS AUTO W/SCOPE: CPT | Performed by: EMERGENCY MEDICINE

## 2021-07-30 PROCEDURE — 73590 X-RAY EXAM OF LOWER LEG: CPT

## 2021-07-30 PROCEDURE — 83880 ASSAY OF NATRIURETIC PEPTIDE: CPT | Performed by: EMERGENCY MEDICINE

## 2021-07-30 PROCEDURE — 80053 COMPREHEN METABOLIC PANEL: CPT | Performed by: EMERGENCY MEDICINE

## 2021-07-30 PROCEDURE — 71045 X-RAY EXAM CHEST 1 VIEW: CPT

## 2021-07-30 PROCEDURE — 85025 COMPLETE CBC W/AUTO DIFF WBC: CPT | Performed by: EMERGENCY MEDICINE

## 2021-07-30 PROCEDURE — 87040 BLOOD CULTURE FOR BACTERIA: CPT | Performed by: EMERGENCY MEDICINE

## 2021-07-30 PROCEDURE — 82962 GLUCOSE BLOOD TEST: CPT

## 2021-07-30 PROCEDURE — 84484 ASSAY OF TROPONIN QUANT: CPT | Performed by: EMERGENCY MEDICINE

## 2021-07-30 PROCEDURE — 83605 ASSAY OF LACTIC ACID: CPT | Performed by: EMERGENCY MEDICINE

## 2021-07-30 PROCEDURE — 25010000002 HEPARIN (PORCINE) PER 1000 UNITS: Performed by: INTERNAL MEDICINE

## 2021-07-30 RX ORDER — SODIUM CHLORIDE 9 MG/ML
50 INJECTION, SOLUTION INTRAVENOUS CONTINUOUS
Status: DISCONTINUED | OUTPATIENT
Start: 2021-07-30 | End: 2021-08-04

## 2021-07-30 RX ORDER — ASPIRIN 81 MG/1
81 TABLET ORAL DAILY
Status: DISCONTINUED | OUTPATIENT
Start: 2021-07-31 | End: 2021-08-12 | Stop reason: HOSPADM

## 2021-07-30 RX ORDER — DULOXETIN HYDROCHLORIDE 20 MG/1
20 CAPSULE, DELAYED RELEASE ORAL DAILY
Status: DISCONTINUED | OUTPATIENT
Start: 2021-07-31 | End: 2021-08-12 | Stop reason: HOSPADM

## 2021-07-30 RX ORDER — SODIUM CHLORIDE 0.9 % (FLUSH) 0.9 %
10 SYRINGE (ML) INJECTION AS NEEDED
Status: DISCONTINUED | OUTPATIENT
Start: 2021-07-30 | End: 2021-08-12 | Stop reason: HOSPADM

## 2021-07-30 RX ORDER — METOPROLOL TARTRATE 50 MG/1
100 TABLET, FILM COATED ORAL EVERY 12 HOURS SCHEDULED
Status: DISCONTINUED | OUTPATIENT
Start: 2021-07-30 | End: 2021-08-12 | Stop reason: HOSPADM

## 2021-07-30 RX ORDER — SODIUM CHLORIDE 0.9 % (FLUSH) 0.9 %
10 SYRINGE (ML) INJECTION EVERY 12 HOURS SCHEDULED
Status: DISCONTINUED | OUTPATIENT
Start: 2021-07-30 | End: 2021-08-12 | Stop reason: HOSPADM

## 2021-07-30 RX ORDER — ISOSORBIDE MONONITRATE 30 MG/1
30 TABLET, EXTENDED RELEASE ORAL DAILY
Status: DISCONTINUED | OUTPATIENT
Start: 2021-07-31 | End: 2021-08-12 | Stop reason: HOSPADM

## 2021-07-30 RX ORDER — ACETAMINOPHEN 325 MG/1
650 TABLET ORAL EVERY 4 HOURS PRN
Status: DISCONTINUED | OUTPATIENT
Start: 2021-07-30 | End: 2021-08-12 | Stop reason: HOSPADM

## 2021-07-30 RX ORDER — GABAPENTIN 400 MG/1
800 CAPSULE ORAL 3 TIMES DAILY
Status: DISCONTINUED | OUTPATIENT
Start: 2021-07-31 | End: 2021-08-12 | Stop reason: HOSPADM

## 2021-07-30 RX ORDER — MORPHINE SULFATE 2 MG/ML
1 INJECTION, SOLUTION INTRAMUSCULAR; INTRAVENOUS
Status: DISPENSED | OUTPATIENT
Start: 2021-07-30 | End: 2021-08-06

## 2021-07-30 RX ORDER — OXYBUTYNIN CHLORIDE 5 MG/1
5 TABLET, EXTENDED RELEASE ORAL DAILY
Status: DISCONTINUED | OUTPATIENT
Start: 2021-07-31 | End: 2021-08-12 | Stop reason: HOSPADM

## 2021-07-30 RX ORDER — NICOTINE POLACRILEX 4 MG
15 LOZENGE BUCCAL
Status: DISCONTINUED | OUTPATIENT
Start: 2021-07-30 | End: 2021-08-12 | Stop reason: HOSPADM

## 2021-07-30 RX ORDER — ATORVASTATIN CALCIUM 40 MG/1
40 TABLET, FILM COATED ORAL DAILY
Status: DISCONTINUED | OUTPATIENT
Start: 2021-07-31 | End: 2021-08-12 | Stop reason: HOSPADM

## 2021-07-30 RX ORDER — HEPARIN SODIUM 5000 [USP'U]/ML
5000 INJECTION, SOLUTION INTRAVENOUS; SUBCUTANEOUS EVERY 12 HOURS SCHEDULED
Status: DISCONTINUED | OUTPATIENT
Start: 2021-07-30 | End: 2021-08-12 | Stop reason: HOSPADM

## 2021-07-30 RX ORDER — DEXTROSE MONOHYDRATE 25 G/50ML
25 INJECTION, SOLUTION INTRAVENOUS
Status: DISCONTINUED | OUTPATIENT
Start: 2021-07-30 | End: 2021-08-03 | Stop reason: SDUPTHER

## 2021-07-30 RX ORDER — CLOPIDOGREL BISULFATE 75 MG/1
75 TABLET ORAL DAILY
Status: DISCONTINUED | OUTPATIENT
Start: 2021-07-31 | End: 2021-08-12 | Stop reason: HOSPADM

## 2021-07-30 RX ADMIN — HUMAN INSULIN 4 UNITS: 100 INJECTION, SOLUTION SUBCUTANEOUS at 22:14

## 2021-07-30 RX ADMIN — GABAPENTIN 800 MG: 400 CAPSULE ORAL at 23:06

## 2021-07-30 RX ADMIN — METOPROLOL TARTRATE 100 MG: 50 TABLET, FILM COATED ORAL at 23:06

## 2021-07-30 RX ADMIN — SODIUM CHLORIDE 50 ML/HR: 9 INJECTION, SOLUTION INTRAVENOUS at 23:06

## 2021-07-30 RX ADMIN — HEPARIN SODIUM 5000 UNITS: 5000 INJECTION INTRAVENOUS; SUBCUTANEOUS at 23:06

## 2021-07-30 RX ADMIN — CEFAZOLIN 1 G: 1 INJECTION, POWDER, FOR SOLUTION INTRAMUSCULAR; INTRAVENOUS; PARENTERAL at 21:45

## 2021-07-31 LAB
ABO GROUP BLD: NORMAL
ANION GAP SERPL CALCULATED.3IONS-SCNC: 11 MMOL/L (ref 5–15)
BASOPHILS # BLD AUTO: 0.06 10*3/MM3 (ref 0–0.2)
BASOPHILS NFR BLD AUTO: 0.6 % (ref 0–1.5)
BLD GP AB SCN SERPL QL: NEGATIVE
BUN SERPL-MCNC: 39 MG/DL (ref 8–23)
BUN/CREAT SERPL: 19.1 (ref 7–25)
CALCIUM SPEC-SCNC: 8.6 MG/DL (ref 8.6–10.5)
CHLORIDE SERPL-SCNC: 101 MMOL/L (ref 98–107)
CO2 SERPL-SCNC: 24 MMOL/L (ref 22–29)
CREAT SERPL-MCNC: 2.04 MG/DL (ref 0.57–1)
DEPRECATED RDW RBC AUTO: 47.6 FL (ref 37–54)
EOSINOPHIL # BLD AUTO: 0.16 10*3/MM3 (ref 0–0.4)
EOSINOPHIL NFR BLD AUTO: 1.5 % (ref 0.3–6.2)
ERYTHROCYTE [DISTWIDTH] IN BLOOD BY AUTOMATED COUNT: 15 % (ref 12.3–15.4)
GFR SERPL CREATININE-BSD FRML MDRD: 25 ML/MIN/1.73
GLUCOSE BLDC GLUCOMTR-MCNC: 248 MG/DL (ref 70–130)
GLUCOSE BLDC GLUCOMTR-MCNC: 273 MG/DL (ref 70–130)
GLUCOSE BLDC GLUCOMTR-MCNC: 303 MG/DL (ref 70–130)
GLUCOSE BLDC GLUCOMTR-MCNC: 367 MG/DL (ref 70–130)
GLUCOSE SERPL-MCNC: 235 MG/DL (ref 65–99)
HCT VFR BLD AUTO: 24.7 % (ref 34–46.6)
HGB BLD-MCNC: 7.8 G/DL (ref 12–15.9)
IMM GRANULOCYTES # BLD AUTO: 0.12 10*3/MM3 (ref 0–0.05)
IMM GRANULOCYTES NFR BLD AUTO: 1.1 % (ref 0–0.5)
LYMPHOCYTES # BLD AUTO: 1.38 10*3/MM3 (ref 0.7–3.1)
LYMPHOCYTES NFR BLD AUTO: 13.1 % (ref 19.6–45.3)
MAGNESIUM SERPL-MCNC: 1.4 MG/DL (ref 1.6–2.4)
MAGNESIUM SERPL-MCNC: 1.8 MG/DL (ref 1.6–2.4)
MCH RBC QN AUTO: 27.3 PG (ref 26.6–33)
MCHC RBC AUTO-ENTMCNC: 31.6 G/DL (ref 31.5–35.7)
MCV RBC AUTO: 86.4 FL (ref 79–97)
MONOCYTES # BLD AUTO: 0.6 10*3/MM3 (ref 0.1–0.9)
MONOCYTES NFR BLD AUTO: 5.7 % (ref 5–12)
NEUTROPHILS NFR BLD AUTO: 78 % (ref 42.7–76)
NEUTROPHILS NFR BLD AUTO: 8.2 10*3/MM3 (ref 1.7–7)
NRBC BLD AUTO-RTO: 0 /100 WBC (ref 0–0.2)
PLATELET # BLD AUTO: 312 10*3/MM3 (ref 140–450)
PMV BLD AUTO: 8.5 FL (ref 6–12)
POTASSIUM SERPL-SCNC: 3.5 MMOL/L (ref 3.5–5.2)
QT INTERVAL: 424 MS
QTC INTERVAL: 454 MS
RBC # BLD AUTO: 2.86 10*6/MM3 (ref 3.77–5.28)
RH BLD: POSITIVE
SODIUM SERPL-SCNC: 136 MMOL/L (ref 136–145)
T&S EXPIRATION DATE: NORMAL
WBC # BLD AUTO: 10.52 10*3/MM3 (ref 3.4–10.8)

## 2021-07-31 PROCEDURE — 25010000002 VANCOMYCIN 5 G RECONSTITUTED SOLUTION: Performed by: EMERGENCY MEDICINE

## 2021-07-31 PROCEDURE — 25010000002 CEFEPIME PER 500 MG: Performed by: INTERNAL MEDICINE

## 2021-07-31 PROCEDURE — 86901 BLOOD TYPING SEROLOGIC RH(D): CPT | Performed by: FAMILY MEDICINE

## 2021-07-31 PROCEDURE — 94799 UNLISTED PULMONARY SVC/PX: CPT

## 2021-07-31 PROCEDURE — 63710000001 INSULIN DETEMIR PER 5 UNITS: Performed by: INTERNAL MEDICINE

## 2021-07-31 PROCEDURE — 25010000002 PIPERACILLIN SOD-TAZOBACTAM PER 1 G: Performed by: FAMILY MEDICINE

## 2021-07-31 PROCEDURE — 86900 BLOOD TYPING SEROLOGIC ABO: CPT | Performed by: FAMILY MEDICINE

## 2021-07-31 PROCEDURE — 83735 ASSAY OF MAGNESIUM: CPT | Performed by: INTERNAL MEDICINE

## 2021-07-31 PROCEDURE — 82962 GLUCOSE BLOOD TEST: CPT

## 2021-07-31 PROCEDURE — G0378 HOSPITAL OBSERVATION PER HR: HCPCS

## 2021-07-31 PROCEDURE — 80048 BASIC METABOLIC PNL TOTAL CA: CPT | Performed by: INTERNAL MEDICINE

## 2021-07-31 PROCEDURE — 25010000002 MORPHINE PER 10 MG: Performed by: INTERNAL MEDICINE

## 2021-07-31 PROCEDURE — 84132 ASSAY OF SERUM POTASSIUM: CPT | Performed by: INTERNAL MEDICINE

## 2021-07-31 PROCEDURE — 86850 RBC ANTIBODY SCREEN: CPT | Performed by: FAMILY MEDICINE

## 2021-07-31 PROCEDURE — 25010000002 PIPERACILLIN SOD-TAZOBACTAM PER 1 G: Performed by: INTERNAL MEDICINE

## 2021-07-31 PROCEDURE — 25010000002 MAGNESIUM SULFATE IN D5W 1G/100ML (PREMIX) 1-5 GM/100ML-% SOLUTION: Performed by: INTERNAL MEDICINE

## 2021-07-31 PROCEDURE — 85025 COMPLETE CBC W/AUTO DIFF WBC: CPT | Performed by: INTERNAL MEDICINE

## 2021-07-31 PROCEDURE — 25010000002 HEPARIN (PORCINE) PER 1000 UNITS: Performed by: INTERNAL MEDICINE

## 2021-07-31 RX ORDER — POTASSIUM CHLORIDE 1.5 G/1.77G
40 POWDER, FOR SOLUTION ORAL AS NEEDED
Status: DISCONTINUED | OUTPATIENT
Start: 2021-07-31 | End: 2021-08-12 | Stop reason: HOSPADM

## 2021-07-31 RX ORDER — MAGNESIUM SULFATE 1 G/100ML
1 INJECTION INTRAVENOUS AS NEEDED
Status: DISCONTINUED | OUTPATIENT
Start: 2021-07-31 | End: 2021-08-12 | Stop reason: HOSPADM

## 2021-07-31 RX ORDER — MAGNESIUM SULFATE HEPTAHYDRATE 40 MG/ML
4 INJECTION, SOLUTION INTRAVENOUS AS NEEDED
Status: DISCONTINUED | OUTPATIENT
Start: 2021-07-31 | End: 2021-08-12 | Stop reason: HOSPADM

## 2021-07-31 RX ORDER — MAGNESIUM SULFATE HEPTAHYDRATE 40 MG/ML
2 INJECTION, SOLUTION INTRAVENOUS AS NEEDED
Status: DISCONTINUED | OUTPATIENT
Start: 2021-07-31 | End: 2021-08-12 | Stop reason: HOSPADM

## 2021-07-31 RX ORDER — POTASSIUM CHLORIDE 7.45 MG/ML
10 INJECTION INTRAVENOUS
Status: DISCONTINUED | OUTPATIENT
Start: 2021-07-31 | End: 2021-08-12 | Stop reason: HOSPADM

## 2021-07-31 RX ORDER — POTASSIUM CHLORIDE 750 MG/1
40 CAPSULE, EXTENDED RELEASE ORAL AS NEEDED
Status: DISCONTINUED | OUTPATIENT
Start: 2021-07-31 | End: 2021-08-12 | Stop reason: HOSPADM

## 2021-07-31 RX ADMIN — SODIUM CHLORIDE, PRESERVATIVE FREE 10 ML: 5 INJECTION INTRAVENOUS at 21:20

## 2021-07-31 RX ADMIN — CEFEPIME HYDROCHLORIDE 2 G: 2 INJECTION, POWDER, FOR SOLUTION INTRAVENOUS at 14:32

## 2021-07-31 RX ADMIN — MAGNESIUM SULFATE HEPTAHYDRATE 1 G: 1 INJECTION, SOLUTION INTRAVENOUS at 07:58

## 2021-07-31 RX ADMIN — MORPHINE SULFATE 1 MG: 2 INJECTION, SOLUTION INTRAMUSCULAR; INTRAVENOUS at 00:17

## 2021-07-31 RX ADMIN — PIPERACILLIN SODIUM AND TAZOBACTAM SODIUM 4.5 G: 4; .5 INJECTION, POWDER, LYOPHILIZED, FOR SOLUTION INTRAVENOUS at 21:21

## 2021-07-31 RX ADMIN — VANCOMYCIN HYDROCHLORIDE 2000 MG: 5 INJECTION, POWDER, LYOPHILIZED, FOR SOLUTION INTRAVENOUS at 00:37

## 2021-07-31 RX ADMIN — GABAPENTIN 800 MG: 400 CAPSULE ORAL at 23:12

## 2021-07-31 RX ADMIN — HEPARIN SODIUM 5000 UNITS: 5000 INJECTION INTRAVENOUS; SUBCUTANEOUS at 21:19

## 2021-07-31 RX ADMIN — METOPROLOL TARTRATE 100 MG: 50 TABLET, FILM COATED ORAL at 08:02

## 2021-07-31 RX ADMIN — METRONIDAZOLE 500 MG: 500 INJECTION, SOLUTION INTRAVENOUS at 14:32

## 2021-07-31 RX ADMIN — MORPHINE SULFATE 1 MG: 2 INJECTION, SOLUTION INTRAMUSCULAR; INTRAVENOUS at 21:18

## 2021-07-31 RX ADMIN — POTASSIUM CHLORIDE 40 MEQ: 750 CAPSULE, EXTENDED RELEASE ORAL at 23:12

## 2021-07-31 RX ADMIN — INSULIN DETEMIR 45 UNITS: 100 INJECTION, SOLUTION SUBCUTANEOUS at 08:08

## 2021-07-31 RX ADMIN — PIPERACILLIN SODIUM AND TAZOBACTAM SODIUM 3.38 G: 3; .375 INJECTION, POWDER, LYOPHILIZED, FOR SOLUTION INTRAVENOUS at 00:11

## 2021-07-31 RX ADMIN — DULOXETINE HYDROCHLORIDE 20 MG: 20 CAPSULE, DELAYED RELEASE ORAL at 08:02

## 2021-07-31 RX ADMIN — OXYBUTYNIN CHLORIDE 5 MG: 5 TABLET, EXTENDED RELEASE ORAL at 08:02

## 2021-07-31 RX ADMIN — INSULIN DETEMIR 45 UNITS: 100 INJECTION, SOLUTION SUBCUTANEOUS at 21:18

## 2021-07-31 RX ADMIN — CLOPIDOGREL BISULFATE 75 MG: 75 TABLET ORAL at 08:02

## 2021-07-31 RX ADMIN — POTASSIUM CHLORIDE 40 MEQ: 750 CAPSULE, EXTENDED RELEASE ORAL at 16:27

## 2021-07-31 RX ADMIN — PIPERACILLIN SODIUM AND TAZOBACTAM SODIUM 3.38 G: 3; .375 INJECTION, POWDER, LYOPHILIZED, FOR SOLUTION INTRAVENOUS at 06:19

## 2021-07-31 RX ADMIN — ISOSORBIDE MONONITRATE 30 MG: 30 TABLET, EXTENDED RELEASE ORAL at 08:02

## 2021-07-31 RX ADMIN — MORPHINE SULFATE 1 MG: 2 INJECTION, SOLUTION INTRAMUSCULAR; INTRAVENOUS at 07:57

## 2021-07-31 RX ADMIN — MORPHINE SULFATE 1 MG: 2 INJECTION, SOLUTION INTRAMUSCULAR; INTRAVENOUS at 16:27

## 2021-07-31 RX ADMIN — ASPIRIN 81 MG: 81 TABLET, FILM COATED ORAL at 08:02

## 2021-07-31 RX ADMIN — MORPHINE SULFATE 1 MG: 2 INJECTION, SOLUTION INTRAMUSCULAR; INTRAVENOUS at 12:51

## 2021-07-31 RX ADMIN — GABAPENTIN 800 MG: 400 CAPSULE ORAL at 16:27

## 2021-07-31 RX ADMIN — SODIUM CHLORIDE, PRESERVATIVE FREE 10 ML: 5 INJECTION INTRAVENOUS at 08:02

## 2021-07-31 RX ADMIN — ATORVASTATIN CALCIUM 40 MG: 40 TABLET, FILM COATED ORAL at 08:02

## 2021-07-31 RX ADMIN — METOPROLOL TARTRATE 100 MG: 50 TABLET, FILM COATED ORAL at 21:20

## 2021-07-31 RX ADMIN — GABAPENTIN 800 MG: 400 CAPSULE ORAL at 08:08

## 2021-07-31 RX ADMIN — INSULIN DETEMIR 45 UNITS: 100 INJECTION, SOLUTION SUBCUTANEOUS at 00:11

## 2021-07-31 RX ADMIN — HEPARIN SODIUM 5000 UNITS: 5000 INJECTION INTRAVENOUS; SUBCUTANEOUS at 08:03

## 2021-08-01 ENCOUNTER — APPOINTMENT (OUTPATIENT)
Dept: ULTRASOUND IMAGING | Facility: HOSPITAL | Age: 61
End: 2021-08-01

## 2021-08-01 ENCOUNTER — APPOINTMENT (OUTPATIENT)
Dept: INTERVENTIONAL RADIOLOGY/VASCULAR | Facility: HOSPITAL | Age: 61
End: 2021-08-01

## 2021-08-01 LAB
ANION GAP SERPL CALCULATED.3IONS-SCNC: 10 MMOL/L (ref 5–15)
BASOPHILS # BLD AUTO: 0.05 10*3/MM3 (ref 0–0.2)
BASOPHILS NFR BLD AUTO: 0.6 % (ref 0–1.5)
BUN SERPL-MCNC: 39 MG/DL (ref 8–23)
BUN/CREAT SERPL: 17.3 (ref 7–25)
CALCIUM SPEC-SCNC: 8.1 MG/DL (ref 8.6–10.5)
CHLORIDE SERPL-SCNC: 103 MMOL/L (ref 98–107)
CO2 SERPL-SCNC: 20 MMOL/L (ref 22–29)
CREAT SERPL-MCNC: 2.26 MG/DL (ref 0.57–1)
CRP SERPL-MCNC: 12.38 MG/DL (ref 0–0.5)
DEPRECATED RDW RBC AUTO: 46.9 FL (ref 37–54)
EOSINOPHIL # BLD AUTO: 0.23 10*3/MM3 (ref 0–0.4)
EOSINOPHIL NFR BLD AUTO: 2.6 % (ref 0.3–6.2)
ERYTHROCYTE [DISTWIDTH] IN BLOOD BY AUTOMATED COUNT: 15 % (ref 12.3–15.4)
GFR SERPL CREATININE-BSD FRML MDRD: 22 ML/MIN/1.73
GLUCOSE BLDC GLUCOMTR-MCNC: 196 MG/DL (ref 70–130)
GLUCOSE BLDC GLUCOMTR-MCNC: 199 MG/DL (ref 70–130)
GLUCOSE BLDC GLUCOMTR-MCNC: 255 MG/DL (ref 70–130)
GLUCOSE BLDC GLUCOMTR-MCNC: 256 MG/DL (ref 70–130)
GLUCOSE BLDC GLUCOMTR-MCNC: 300 MG/DL (ref 70–130)
GLUCOSE SERPL-MCNC: 171 MG/DL (ref 65–99)
HCT VFR BLD AUTO: 23.1 % (ref 34–46.6)
HGB BLD-MCNC: 7.2 G/DL (ref 12–15.9)
IMM GRANULOCYTES # BLD AUTO: 0.07 10*3/MM3 (ref 0–0.05)
IMM GRANULOCYTES NFR BLD AUTO: 0.8 % (ref 0–0.5)
LYMPHOCYTES # BLD AUTO: 1.75 10*3/MM3 (ref 0.7–3.1)
LYMPHOCYTES NFR BLD AUTO: 19.7 % (ref 19.6–45.3)
Lab: NORMAL
MAGNESIUM SERPL-MCNC: 2.3 MG/DL (ref 1.6–2.4)
MCH RBC QN AUTO: 27 PG (ref 26.6–33)
MCHC RBC AUTO-ENTMCNC: 31.2 G/DL (ref 31.5–35.7)
MCV RBC AUTO: 86.5 FL (ref 79–97)
MONOCYTES # BLD AUTO: 0.62 10*3/MM3 (ref 0.1–0.9)
MONOCYTES NFR BLD AUTO: 7 % (ref 5–12)
NEUTROPHILS NFR BLD AUTO: 6.18 10*3/MM3 (ref 1.7–7)
NEUTROPHILS NFR BLD AUTO: 69.3 % (ref 42.7–76)
NRBC BLD AUTO-RTO: 0 /100 WBC (ref 0–0.2)
PLATELET # BLD AUTO: 280 10*3/MM3 (ref 140–450)
PMV BLD AUTO: 8.7 FL (ref 6–12)
POTASSIUM SERPL-SCNC: 4.2 MMOL/L (ref 3.5–5.2)
POTASSIUM SERPL-SCNC: 4.3 MMOL/L (ref 3.5–5.2)
RBC # BLD AUTO: 2.67 10*6/MM3 (ref 3.77–5.28)
SODIUM SERPL-SCNC: 133 MMOL/L (ref 136–145)
VANCOMYCIN TROUGH SERPL-MCNC: 22.1 MCG/ML (ref 5–20)
WBC # BLD AUTO: 8.9 10*3/MM3 (ref 3.4–10.8)

## 2021-08-01 PROCEDURE — 25010000002 MORPHINE PER 10 MG: Performed by: INTERNAL MEDICINE

## 2021-08-01 PROCEDURE — 83735 ASSAY OF MAGNESIUM: CPT | Performed by: INTERNAL MEDICINE

## 2021-08-01 PROCEDURE — 36410 VNPNXR 3YR/> PHY/QHP DX/THER: CPT

## 2021-08-01 PROCEDURE — 25010000002 HEPARIN (PORCINE) PER 1000 UNITS: Performed by: INTERNAL MEDICINE

## 2021-08-01 PROCEDURE — 86140 C-REACTIVE PROTEIN: CPT | Performed by: FAMILY MEDICINE

## 2021-08-01 PROCEDURE — 25010000002 MAGNESIUM SULFATE IN D5W 1G/100ML (PREMIX) 1-5 GM/100ML-% SOLUTION: Performed by: INTERNAL MEDICINE

## 2021-08-01 PROCEDURE — 76937 US GUIDE VASCULAR ACCESS: CPT

## 2021-08-01 PROCEDURE — G0378 HOSPITAL OBSERVATION PER HR: HCPCS

## 2021-08-01 PROCEDURE — C1751 CATH, INF, PER/CENT/MIDLINE: HCPCS

## 2021-08-01 PROCEDURE — 25010000002 PIPERACILLIN SOD-TAZOBACTAM PER 1 G: Performed by: FAMILY MEDICINE

## 2021-08-01 PROCEDURE — 25010000002 VANCOMYCIN 5 G RECONSTITUTED SOLUTION: Performed by: INTERNAL MEDICINE

## 2021-08-01 PROCEDURE — 80048 BASIC METABOLIC PNL TOTAL CA: CPT | Performed by: FAMILY MEDICINE

## 2021-08-01 PROCEDURE — 80202 ASSAY OF VANCOMYCIN: CPT | Performed by: INTERNAL MEDICINE

## 2021-08-01 PROCEDURE — 82962 GLUCOSE BLOOD TEST: CPT

## 2021-08-01 PROCEDURE — 85025 COMPLETE CBC W/AUTO DIFF WBC: CPT | Performed by: FAMILY MEDICINE

## 2021-08-01 PROCEDURE — 63710000001 INSULIN DETEMIR PER 5 UNITS: Performed by: INTERNAL MEDICINE

## 2021-08-01 PROCEDURE — 25010000002 MAGNESIUM SULFATE 2 GM/50ML SOLUTION: Performed by: INTERNAL MEDICINE

## 2021-08-01 RX ORDER — DOCOSANOL 100 MG/G
CREAM TOPICAL
Status: DISCONTINUED | OUTPATIENT
Start: 2021-08-01 | End: 2021-08-01 | Stop reason: SDUPTHER

## 2021-08-01 RX ORDER — NYSTATIN 100000 [USP'U]/G
POWDER TOPICAL 2 TIMES DAILY PRN
Status: DISCONTINUED | OUTPATIENT
Start: 2021-08-01 | End: 2021-08-12 | Stop reason: HOSPADM

## 2021-08-01 RX ORDER — DOCOSANOL 100 MG/G
CREAM TOPICAL
Status: DISCONTINUED | OUTPATIENT
Start: 2021-08-01 | End: 2021-08-12 | Stop reason: HOSPADM

## 2021-08-01 RX ORDER — HYDROCODONE BITARTRATE AND ACETAMINOPHEN 5; 325 MG/1; MG/1
1 TABLET ORAL EVERY 6 HOURS PRN
Status: DISCONTINUED | OUTPATIENT
Start: 2021-08-01 | End: 2021-08-12 | Stop reason: HOSPADM

## 2021-08-01 RX ADMIN — DOCOSANOL: 100 CREAM TOPICAL at 11:18

## 2021-08-01 RX ADMIN — GABAPENTIN 800 MG: 400 CAPSULE ORAL at 15:00

## 2021-08-01 RX ADMIN — METOPROLOL TARTRATE 100 MG: 50 TABLET, FILM COATED ORAL at 08:06

## 2021-08-01 RX ADMIN — SODIUM CHLORIDE 50 ML/HR: 9 INJECTION, SOLUTION INTRAVENOUS at 00:59

## 2021-08-01 RX ADMIN — GABAPENTIN 800 MG: 400 CAPSULE ORAL at 08:06

## 2021-08-01 RX ADMIN — METOPROLOL TARTRATE 100 MG: 50 TABLET, FILM COATED ORAL at 19:27

## 2021-08-01 RX ADMIN — SODIUM CHLORIDE, PRESERVATIVE FREE 10 ML: 5 INJECTION INTRAVENOUS at 08:08

## 2021-08-01 RX ADMIN — GABAPENTIN 800 MG: 400 CAPSULE ORAL at 19:27

## 2021-08-01 RX ADMIN — NYSTATIN: 100000 POWDER TOPICAL at 11:18

## 2021-08-01 RX ADMIN — PIPERACILLIN SODIUM AND TAZOBACTAM SODIUM 4.5 G: 4; .5 INJECTION, POWDER, LYOPHILIZED, FOR SOLUTION INTRAVENOUS at 17:54

## 2021-08-01 RX ADMIN — PIPERACILLIN SODIUM AND TAZOBACTAM SODIUM 4.5 G: 4; .5 INJECTION, POWDER, LYOPHILIZED, FOR SOLUTION INTRAVENOUS at 02:57

## 2021-08-01 RX ADMIN — DOCOSANOL: 100 CREAM TOPICAL at 21:43

## 2021-08-01 RX ADMIN — SODIUM CHLORIDE, PRESERVATIVE FREE 10 ML: 5 INJECTION INTRAVENOUS at 19:27

## 2021-08-01 RX ADMIN — INSULIN DETEMIR 45 UNITS: 100 INJECTION, SOLUTION SUBCUTANEOUS at 20:54

## 2021-08-01 RX ADMIN — MORPHINE SULFATE 1 MG: 2 INJECTION, SOLUTION INTRAMUSCULAR; INTRAVENOUS at 14:55

## 2021-08-01 RX ADMIN — HEPARIN SODIUM 5000 UNITS: 5000 INJECTION INTRAVENOUS; SUBCUTANEOUS at 08:05

## 2021-08-01 RX ADMIN — DOCOSANOL: 100 CREAM TOPICAL at 17:54

## 2021-08-01 RX ADMIN — MAGNESIUM SULFATE HEPTAHYDRATE 2 G: 40 INJECTION, SOLUTION INTRAVENOUS at 02:57

## 2021-08-01 RX ADMIN — HEPARIN SODIUM 5000 UNITS: 5000 INJECTION INTRAVENOUS; SUBCUTANEOUS at 19:27

## 2021-08-01 RX ADMIN — DOCOSANOL: 100 CREAM TOPICAL at 15:00

## 2021-08-01 RX ADMIN — OXYBUTYNIN CHLORIDE 5 MG: 5 TABLET, EXTENDED RELEASE ORAL at 08:06

## 2021-08-01 RX ADMIN — ISOSORBIDE MONONITRATE 30 MG: 30 TABLET, EXTENDED RELEASE ORAL at 08:06

## 2021-08-01 RX ADMIN — ASPIRIN 81 MG: 81 TABLET, FILM COATED ORAL at 08:06

## 2021-08-01 RX ADMIN — PIPERACILLIN SODIUM AND TAZOBACTAM SODIUM 4.5 G: 4; .5 INJECTION, POWDER, LYOPHILIZED, FOR SOLUTION INTRAVENOUS at 11:18

## 2021-08-01 RX ADMIN — ATORVASTATIN CALCIUM 40 MG: 40 TABLET, FILM COATED ORAL at 08:06

## 2021-08-01 RX ADMIN — MORPHINE SULFATE 1 MG: 2 INJECTION, SOLUTION INTRAMUSCULAR; INTRAVENOUS at 07:59

## 2021-08-01 RX ADMIN — CLOPIDOGREL BISULFATE 75 MG: 75 TABLET ORAL at 08:06

## 2021-08-01 RX ADMIN — INSULIN DETEMIR 45 UNITS: 100 INJECTION, SOLUTION SUBCUTANEOUS at 08:06

## 2021-08-01 RX ADMIN — DULOXETINE HYDROCHLORIDE 20 MG: 20 CAPSULE, DELAYED RELEASE ORAL at 08:06

## 2021-08-01 RX ADMIN — HYDROCODONE BITARTRATE AND ACETAMINOPHEN 1 TABLET: 5; 325 TABLET ORAL at 20:54

## 2021-08-01 RX ADMIN — VANCOMYCIN HYDROCHLORIDE 1500 MG: 5 INJECTION, POWDER, LYOPHILIZED, FOR SOLUTION INTRAVENOUS at 00:55

## 2021-08-01 RX ADMIN — MORPHINE SULFATE 1 MG: 2 INJECTION, SOLUTION INTRAMUSCULAR; INTRAVENOUS at 03:46

## 2021-08-01 NOTE — PROGRESS NOTES
"Pharmacokinetics by Pharmacy - Vancomycin    Yamileth Slater is a 61 y.o. female receiving vancomycin 1500mg IV Q24H day 2 for skin and soft tissue infection  Patient is also receiving zosyn    Objective:  [Ht: 167.6 cm (66\"); Wt: 127 kg (280 lb)]     WBC   Date Value Ref Range Status   08/01/2021 8.90 3.40 - 10.80 10*3/mm3 Final   07/31/2021 10.52 3.40 - 10.80 10*3/mm3 Final   07/30/2021 9.11 3.40 - 10.80 10*3/mm3 Final      C-Reactive Protein   Date Value Ref Range Status   08/01/2021 12.38 (H) 0.00 - 0.50 mg/dL Final   01/14/2021 12.40 (H) 0.00 - 0.50 mg/dL Final   04/17/2017 5.40 (H) 0.00 - 1.00 mg/dL Final     Lactate   Date Value Ref Range Status   07/30/2021 0.8 0.5 - 2.0 mmol/L Final   09/10/2018 1.3 0.5 - 2.0 mmol/L Final   09/10/2018 2.1 (C) 0.5 - 2.0 mmol/L Final      Temp Readings from Last 1 Encounters:   08/01/21 96.9 °F (36.1 °C) (Oral)     Estimated Creatinine Clearance: 35.2 mL/min (A) (by C-G formula based on SCr of 2.26 mg/dL (H)).   Creatinine   Date Value Ref Range Status   08/01/2021 2.26 (H) 0.57 - 1.00 mg/dL Final   07/31/2021 2.04 (H) 0.57 - 1.00 mg/dL Final   07/30/2021 2.08 (H) 0.57 - 1.00 mg/dL Final       Vancomycin Trough   Date Value Ref Range Status   03/20/2017 13.25 10.00 - 15.00 mcg/mL Final   12/14/2016 10.5 10.0 - 15.0 ug/ml Final     Comment:     For complicated infections (endocarditis, meningitis, osteomyelitis,  hospital acquired pneumonia caused by staph aureus), IDSA recommends  trough levels of 15-20 ug/mL.     10/01/2016 5.9 (L) 10.0 - 15.0 ug/ml Final     Comment:     For complicated infections (endocarditis, meningitis, osteomyelitis,  hospital acquired pneumonia caused by staph aureus), IDSA recommends  trough levels of 15-20 ug/mL.       Vancomycin Random   Date Value Ref Range Status   04/02/2021 15.80 5.00 - 40.00 mcg/mL Final       Culture Results:  Microbiology Results (last 10 days)       Procedure Component Value - Date/Time    Blood Culture - Blood, Arm, " Left [[id number]] Collected: 07/30/21 2011    Lab Status: Preliminary result Specimen: Blood from Arm, Left Updated: 07/31/21 2212     Blood Culture No growth at 24 hours    Narrative:      Plated and returned to blood culture incubator at 2200    Blood Culture - Blood, Wrist, Right [[id number]] Collected: 07/30/21 2008    Lab Status: Preliminary result Specimen: Blood from Wrist, Right Updated: 07/31/21 2045     Blood Culture No growth at 24 hours    COVID-19 and FLU A/B PCR - Swab, Nasopharynx [[id number]]  (Normal) Collected: 07/30/21 1929    Lab Status: Final result Specimen: Swab from Nasopharynx Updated: 07/30/21 2001     COVID19 Not Detected     Influenza A PCR Not Detected     Influenza B PCR Not Detected    Narrative:      Fact sheet for providers: https://www.fda.gov/media/958980/download    Fact sheet for patients: https://www.fda.gov/media/206118/download    Test performed by PCR.          No results found for: RESPCX      Assessment:  WBC WNL  SCr trended up slightly by 0.2 today, will continue to monitor, 2.26  Afebrile    Labs in progress:  7/30 BCx2 NG24H    Called Jennifer Denny to see about previous abx; 2 days of zosyn, then changed to rocephin 1 gram for 2 days, discharged 7/24, I don't see any oral outpatient abx on history tab around this discharge date.    Awaiting trough tonight before 3rd dose due to CrCl in 30's    Utilizing traditional dosing  Goal trough 10-15    Plan:  1. Continue vancomycin 1500mg IV Q24H  2. Awaiting trough 8/1 2230  3. Pharmacy will monitor renal function and adjust dose accordingly.      Eusebio Huertas, PharmD   08/01/21 11:48 CDT

## 2021-08-01 NOTE — PROGRESS NOTES
TWO PATIENT IDENTIFIERS WERE USED. THE PATIENT WAS DRAPED WITH A FULL BODY DRAPE AND THE PATIENT'S RIGHT ARM WAS PREPPED WITH CHLORA PREP. ULTRASOUND WAS USED TO LOCALIZE THERIGHT BASILIC VEIN. SUBCUTANEOUS TISSUE AT THE CATHETER SITE WAS INFILTRATED WITH 2% LIDOCAINE. UNDER ULTRASOUND GUIDANCE, THE VEIN WAS ACCESSED WITH A 21 GAUGE  NEEDLE. AN 0.018 WIRE WAS THEN THREADED THROUGH THE NEEDLE. THE 21 GAUGE NEEDLE WAS REMOVED AND A 4 Setswana SHEATH WAS PLACED OVER THE WIRE INTO THE VEIN.THE MIDLINE CATHETER WAS TRIMMED TO 20CM. THE MIDLINE CATHETER WAS THEN PLACED OVER THE WIRE INTO THE VEIN, THE SHEATH WAS PEELED AWAY, WIRE WAS REMOVED. CATHETER WAS FLUSHED WITH NORMAL SALINE AND CATHETER TIP APPLIED. BIOPATCH PLACED. CATHETER SECURED WITH STAT LOCK AND TEGADERM. PATIENT TOLERATED PROCEDURE WELL. THIS WAS DONE IN THE ANGIOSUITE      IMPRESSION:SUCCESSFUL PLACEMENT OF DUAL LUMEN MIDLINE.           Luisa Gar  8/1/2021  10:14 CDT

## 2021-08-01 NOTE — PROGRESS NOTES
[facility] Medicine Services  INPATIENT PROGRESS NOTE    Length of Stay: 0  Date of Admission: 7/30/2021  Primary Care Physician: Rianna Macias MD    Subjective   Chief Complaint: Cellulitis, leg pain  HPI:  Continues with leg pain. States this has been ongoing for over a week, was previously hospitalized last week for the same issue at [facility].  Says she was prematurely discharged. Pharmacy notes show patient received short course of zosyn then rocephin without outpatient PO antibiotics to follow.   States that her leg pain is severe.  Pain medication provided to her is helpful.    She denies chest pain, shortness of breath, cough, congestion.  She reports having chronic kidney disease stage IV and anemia with previous requirement for transfusion.  Denies fever, chills, blood in stool or urine.    Review of Systems     All pertinent negatives and positives are as above. All other systems have been reviewed and are negative unless otherwise stated.     Objective    Temp:  [96.7 °F (35.9 °C)-99.3 °F (37.4 °C)] 96.9 °F (36.1 °C)  Heart Rate:  [62-80] 64  Resp:  [20-22] 20  BP: (115-160)/(62-86) 138/62    Physical Exam  Vitals and nursing note reviewed.   Constitutional:       Appearance: Normal appearance.   HENT:      Head: Normocephalic.   Cardiovascular:      Rate and Rhythm: Normal rate and regular rhythm.      Pulses: Normal pulses.   Pulmonary:      Effort: Pulmonary effort is normal.      Breath sounds: Normal breath sounds.   Abdominal:      General: Bowel sounds are normal.      Palpations: Abdomen is soft.   Musculoskeletal:      Right lower leg: Edema present.      Left lower leg: Edema present.   Skin:     General: Skin is warm and dry.      Findings: Erythema (left lateral and posterior leg-worse today) present.   Neurological:      General: No focal deficit present.      Mental Status: She is alert and oriented to person, place, and time.    Psychiatric:         Mood and Affect: Mood normal.         Behavior: Behavior normal.             Results Review:  I have reviewed the labs, radiology results, and diagnostic studies.    Laboratory Data:   Results from last 7 days   Lab Units 08/01/21 0544 07/31/21 2228 07/31/21 0541 07/30/21 2011 07/30/21 2011   SODIUM mmol/L 133*  --  136  --  128*   POTASSIUM mmol/L 4.3 4.2 3.5   < > 3.7   CHLORIDE mmol/L 103  --  101  --  94*   CO2 mmol/L 20.0*  --  24.0  --  25.0   BUN mg/dL 39*  --  39*  --  41*   CREATININE mg/dL 2.26*  --  2.04*  --  2.08*   GLUCOSE mg/dL 171*  --  235*  --  305*   CALCIUM mg/dL 8.1*  --  8.6  --  8.7   BILIRUBIN mg/dL  --   --   --   --  0.2   ALK PHOS U/L  --   --   --   --  206*   ALT (SGPT) U/L  --   --   --   --  12   AST (SGOT) U/L  --   --   --   --  19   ANION GAP mmol/L 10.0  --  11.0  --  9.0    < > = values in this interval not displayed.     Estimated Creatinine Clearance: 35.2 mL/min (A) (by C-G formula based on SCr of 2.26 mg/dL (H)).  Results from last 7 days   Lab Units 08/01/21 0544 07/31/21 2228 07/31/21 0541   MAGNESIUM mg/dL 2.3 1.8 1.4*         Results from last 7 days   Lab Units 08/01/21 0544 07/31/21 0541 07/30/21 2011   WBC 10*3/mm3 8.90 10.52 9.11   HEMOGLOBIN g/dL 7.2* 7.8* 8.2*   HEMATOCRIT % 23.1* 24.7* 26.3*   PLATELETS 10*3/mm3 280 312 340           Culture Data:   Blood Culture   Date Value Ref Range Status   07/30/2021 No growth at 24 hours  Preliminary   07/30/2021 No growth at 24 hours  Preliminary     No results found for: URINECX  No results found for: RESPCX  No results found for: WOUNDCX  No results found for: STOOLCX  No components found for: BODYFLD    Radiology Data:   Imaging Results (Last 24 Hours)     Procedure Component Value Units Date/Time    US Guided Vascular Access [[id number]] Collected: 08/01/21 0939     Updated: 08/01/21 1121    Narrative:      EXAM: US GUIDANCE FOR VASCULAR ACCESS    INDICATION: IV  access    FINDINGS:  Real-time ultrasound imaging was utilized to visualize needle  entry into the patient right basilic vein during midline catheter  placement. 2 images were stored for recording and reporting.      Impression:      Imaging obtained during vascular access device placement under  ultrasound guidance.    Electronically signed by:  Kamar Khanna MD  8/1/2021  11:20 AM CDT Workstation: [id number]    IR Insert Midline Without Port Pump 5 Plus [[id number]] Resulted: 08/01/21 1015     Updated: 08/01/21 1015    Narrative:      This procedure was auto-finalized with no dictation required.          I have reviewed the patient's current medications.     Assessment/Plan         Cellulitis of left leg-With noted failure of previous of IV antibiotic course. LE duplex study negative for DVT. Will continue current coverage with Vancomycin and zosyn.  CRP >12, will trend.. Follow clinical course.     CKD IV-Creatinine at baseline by review of chart. Creatinine with increase today  But generally stable. Pharmacy dosing antibiotic coverage. Trend labs.     CAD-Currently stable without chest pain or reported equivalent.Noted abnormal troponin with stable EKG. Suspect this is related to renal insufficiency. Continue cardiac monitoring.      COPD-Stable without exacerbation.     Anemia-acute on chronic secondary to CKD. No reported blood loss. Check stool for occult blood. Discussed with patient, will avoid transfusion as possible. Trend labs-if further decline, will need to transfuse.      Abnormal troponin-as above.     Electrolyte Imbalance-Mg improved today.        Discharge Planning: I expect patient to be discharged in 2-3 days.     Genevieve Raymundo MD     I confirmed that the patient's Advance Care Plan is present, code status is documented, or surrogate decision maker is listed in the patient's medical record.

## 2021-08-01 NOTE — PLAN OF CARE
Problem: Adult Inpatient Plan of Care  Goal: Plan of Care Review  Outcome: Ongoing, Progressing  Goal: Patient-Specific Goal (Individualized)  Outcome: Ongoing, Progressing  Goal: Absence of Hospital-Acquired Illness or Injury  Outcome: Ongoing, Progressing  Intervention: Identify and Manage Fall Risk  Recent Flowsheet Documentation  Taken 8/1/2021 1300 by Angela West RN  Safety Promotion/Fall Prevention:   safety round/check completed   nonskid shoes/slippers when out of bed   activity supervised  Taken 8/1/2021 1100 by Angela West RN  Safety Promotion/Fall Prevention:   safety round/check completed   nonskid shoes/slippers when out of bed   activity supervised   fall prevention program maintained  Taken 8/1/2021 0900 by Angela West RN  Safety Promotion/Fall Prevention: safety round/check completed  Intervention: Prevent Skin Injury  Recent Flowsheet Documentation  Taken 8/1/2021 1300 by Angela West RN  Body Position: dangle, side of bed  Taken 8/1/2021 1100 by Angela West RN  Body Position: supine, legs elevated  Taken 8/1/2021 0900 by Angela West RN  Body Position: supine, legs elevated  Intervention: Prevent and Manage VTE (venous thromboembolism) Risk  Recent Flowsheet Documentation  Taken 8/1/2021 1300 by Angela West RN  VTE Prevention/Management: other (see comments)  Taken 8/1/2021 1100 by Angela West RN  VTE Prevention/Management: (heparin) other (see comments)  Goal: Optimal Comfort and Wellbeing  Outcome: Ongoing, Progressing  Goal: Readiness for Transition of Care  Outcome: Ongoing, Progressing     Problem: Skin or Soft Tissue Infection  Goal: Infection Symptom Resolution  Outcome: Ongoing, Progressing     Problem: COPD Comorbidity  Goal: Maintenance of COPD Symptom Control  Outcome: Ongoing, Progressing     Problem: Hypertension Comorbidity  Goal: Blood Pressure in Desired Range  Outcome: Ongoing, Progressing     Problem: Fall Injury Risk  Goal: Absence  of Fall and Fall-Related Injury  Outcome: Ongoing, Progressing  Intervention: Promote Injury-Free Environment  Recent Flowsheet Documentation  Taken 8/1/2021 1300 by Angela Wets, RN  Safety Promotion/Fall Prevention:   safety round/check completed   nonskid shoes/slippers when out of bed   activity supervised  Taken 8/1/2021 1100 by Angela West, RN  Safety Promotion/Fall Prevention:   safety round/check completed   nonskid shoes/slippers when out of bed   activity supervised   fall prevention program maintained  Taken 8/1/2021 0900 by Angela West, RN  Safety Promotion/Fall Prevention: safety round/check completed   Goal Outcome Evaluation:

## 2021-08-02 LAB
ANION GAP SERPL CALCULATED.3IONS-SCNC: 9 MMOL/L (ref 5–15)
BASOPHILS # BLD AUTO: 0.07 10*3/MM3 (ref 0–0.2)
BASOPHILS NFR BLD AUTO: 0.8 % (ref 0–1.5)
BUN SERPL-MCNC: 42 MG/DL (ref 8–23)
BUN/CREAT SERPL: 15.4 (ref 7–25)
CALCIUM SPEC-SCNC: 8.5 MG/DL (ref 8.6–10.5)
CHLORIDE SERPL-SCNC: 100 MMOL/L (ref 98–107)
CO2 SERPL-SCNC: 20 MMOL/L (ref 22–29)
CREAT SERPL-MCNC: 2.73 MG/DL (ref 0.57–1)
CREAT UR-MCNC: 28.3 MG/DL
CRP SERPL-MCNC: 11.4 MG/DL (ref 0–0.5)
DEPRECATED RDW RBC AUTO: 47.9 FL (ref 37–54)
EOSINOPHIL # BLD AUTO: 0.3 10*3/MM3 (ref 0–0.4)
EOSINOPHIL NFR BLD AUTO: 3.2 % (ref 0.3–6.2)
ERYTHROCYTE [DISTWIDTH] IN BLOOD BY AUTOMATED COUNT: 15 % (ref 12.3–15.4)
GFR SERPL CREATININE-BSD FRML MDRD: 18 ML/MIN/1.73
GLUCOSE BLDC GLUCOMTR-MCNC: 299 MG/DL (ref 70–130)
GLUCOSE BLDC GLUCOMTR-MCNC: 334 MG/DL (ref 70–130)
GLUCOSE BLDC GLUCOMTR-MCNC: 337 MG/DL (ref 70–130)
GLUCOSE BLDC GLUCOMTR-MCNC: 348 MG/DL (ref 70–130)
GLUCOSE SERPL-MCNC: 268 MG/DL (ref 65–99)
HCT VFR BLD AUTO: 22.9 % (ref 34–46.6)
HGB BLD-MCNC: 7.4 G/DL (ref 12–15.9)
IMM GRANULOCYTES # BLD AUTO: 0.07 10*3/MM3 (ref 0–0.05)
IMM GRANULOCYTES NFR BLD AUTO: 0.8 % (ref 0–0.5)
LYMPHOCYTES # BLD AUTO: 2.02 10*3/MM3 (ref 0.7–3.1)
LYMPHOCYTES NFR BLD AUTO: 21.8 % (ref 19.6–45.3)
MCH RBC QN AUTO: 28.5 PG (ref 26.6–33)
MCHC RBC AUTO-ENTMCNC: 32.3 G/DL (ref 31.5–35.7)
MCV RBC AUTO: 88.1 FL (ref 79–97)
MONOCYTES # BLD AUTO: 0.63 10*3/MM3 (ref 0.1–0.9)
MONOCYTES NFR BLD AUTO: 6.8 % (ref 5–12)
NEUTROPHILS NFR BLD AUTO: 6.17 10*3/MM3 (ref 1.7–7)
NEUTROPHILS NFR BLD AUTO: 66.6 % (ref 42.7–76)
NRBC BLD AUTO-RTO: 0 /100 WBC (ref 0–0.2)
PLATELET # BLD AUTO: 242 10*3/MM3 (ref 140–450)
PMV BLD AUTO: 8.8 FL (ref 6–12)
POTASSIUM SERPL-SCNC: 4.8 MMOL/L (ref 3.5–5.2)
RBC # BLD AUTO: 2.6 10*6/MM3 (ref 3.77–5.28)
SODIUM SERPL-SCNC: 129 MMOL/L (ref 136–145)
SODIUM UR-SCNC: 61 MMOL/L
WBC # BLD AUTO: 9.26 10*3/MM3 (ref 3.4–10.8)

## 2021-08-02 PROCEDURE — 85025 COMPLETE CBC W/AUTO DIFF WBC: CPT | Performed by: FAMILY MEDICINE

## 2021-08-02 PROCEDURE — 63710000001 INSULIN DETEMIR PER 5 UNITS: Performed by: INTERNAL MEDICINE

## 2021-08-02 PROCEDURE — 80048 BASIC METABOLIC PNL TOTAL CA: CPT | Performed by: FAMILY MEDICINE

## 2021-08-02 PROCEDURE — 94799 UNLISTED PULMONARY SVC/PX: CPT

## 2021-08-02 PROCEDURE — 25010000002 VANCOMYCIN 5 G RECONSTITUTED SOLUTION: Performed by: FAMILY MEDICINE

## 2021-08-02 PROCEDURE — 25010000002 HYDROMORPHONE 1 MG/ML SOLUTION: Performed by: INTERNAL MEDICINE

## 2021-08-02 PROCEDURE — 86140 C-REACTIVE PROTEIN: CPT | Performed by: FAMILY MEDICINE

## 2021-08-02 PROCEDURE — 82962 GLUCOSE BLOOD TEST: CPT

## 2021-08-02 PROCEDURE — 25010000002 HEPARIN (PORCINE) PER 1000 UNITS: Performed by: INTERNAL MEDICINE

## 2021-08-02 PROCEDURE — 82570 ASSAY OF URINE CREATININE: CPT | Performed by: INTERNAL MEDICINE

## 2021-08-02 PROCEDURE — 25010000002 CEFEPIME PER 500 MG: Performed by: FAMILY MEDICINE

## 2021-08-02 PROCEDURE — 25010000002 MORPHINE PER 10 MG: Performed by: INTERNAL MEDICINE

## 2021-08-02 PROCEDURE — 25010000002 PIPERACILLIN SOD-TAZOBACTAM PER 1 G: Performed by: FAMILY MEDICINE

## 2021-08-02 PROCEDURE — 84300 ASSAY OF URINE SODIUM: CPT | Performed by: INTERNAL MEDICINE

## 2021-08-02 PROCEDURE — G0378 HOSPITAL OBSERVATION PER HR: HCPCS

## 2021-08-02 PROCEDURE — 81001 URINALYSIS AUTO W/SCOPE: CPT | Performed by: FAMILY MEDICINE

## 2021-08-02 RX ORDER — SODIUM BICARBONATE 650 MG/1
650 TABLET ORAL 3 TIMES DAILY
Status: DISCONTINUED | OUTPATIENT
Start: 2021-08-02 | End: 2021-08-04

## 2021-08-02 RX ADMIN — DOCOSANOL: 100 CREAM TOPICAL at 05:59

## 2021-08-02 RX ADMIN — CEFEPIME HYDROCHLORIDE 2 G: 2 INJECTION, POWDER, FOR SOLUTION INTRAVENOUS at 20:21

## 2021-08-02 RX ADMIN — MORPHINE SULFATE 1 MG: 2 INJECTION, SOLUTION INTRAMUSCULAR; INTRAVENOUS at 21:15

## 2021-08-02 RX ADMIN — DULOXETINE HYDROCHLORIDE 20 MG: 20 CAPSULE, DELAYED RELEASE ORAL at 08:15

## 2021-08-02 RX ADMIN — GABAPENTIN 800 MG: 400 CAPSULE ORAL at 15:59

## 2021-08-02 RX ADMIN — DOCOSANOL: 100 CREAM TOPICAL at 11:34

## 2021-08-02 RX ADMIN — METOPROLOL TARTRATE 100 MG: 50 TABLET, FILM COATED ORAL at 08:15

## 2021-08-02 RX ADMIN — HYDROMORPHONE HYDROCHLORIDE 0.25 MG: 1 INJECTION, SOLUTION INTRAMUSCULAR; INTRAVENOUS; SUBCUTANEOUS at 14:51

## 2021-08-02 RX ADMIN — ASPIRIN 81 MG: 81 TABLET, FILM COATED ORAL at 08:14

## 2021-08-02 RX ADMIN — HYDROCODONE BITARTRATE AND ACETAMINOPHEN 1 TABLET: 5; 325 TABLET ORAL at 06:30

## 2021-08-02 RX ADMIN — PIPERACILLIN SODIUM AND TAZOBACTAM SODIUM 4.5 G: 4; .5 INJECTION, POWDER, LYOPHILIZED, FOR SOLUTION INTRAVENOUS at 03:17

## 2021-08-02 RX ADMIN — ISOSORBIDE MONONITRATE 30 MG: 30 TABLET, EXTENDED RELEASE ORAL at 08:15

## 2021-08-02 RX ADMIN — SODIUM BICARBONATE 650 MG: 650 TABLET ORAL at 20:17

## 2021-08-02 RX ADMIN — INSULIN DETEMIR 45 UNITS: 100 INJECTION, SOLUTION SUBCUTANEOUS at 21:15

## 2021-08-02 RX ADMIN — HEPARIN SODIUM 5000 UNITS: 5000 INJECTION INTRAVENOUS; SUBCUTANEOUS at 20:16

## 2021-08-02 RX ADMIN — SODIUM CHLORIDE, PRESERVATIVE FREE 10 ML: 5 INJECTION INTRAVENOUS at 20:17

## 2021-08-02 RX ADMIN — SODIUM CHLORIDE 50 ML/HR: 9 INJECTION, SOLUTION INTRAVENOUS at 23:06

## 2021-08-02 RX ADMIN — HYDROMORPHONE HYDROCHLORIDE 0.25 MG: 1 INJECTION, SOLUTION INTRAMUSCULAR; INTRAVENOUS; SUBCUTANEOUS at 23:06

## 2021-08-02 RX ADMIN — ATORVASTATIN CALCIUM 40 MG: 40 TABLET, FILM COATED ORAL at 08:14

## 2021-08-02 RX ADMIN — GABAPENTIN 800 MG: 400 CAPSULE ORAL at 20:16

## 2021-08-02 RX ADMIN — VANCOMYCIN HYDROCHLORIDE 1250 MG: 5 INJECTION, POWDER, LYOPHILIZED, FOR SOLUTION INTRAVENOUS at 14:18

## 2021-08-02 RX ADMIN — CLOPIDOGREL BISULFATE 75 MG: 75 TABLET ORAL at 08:15

## 2021-08-02 RX ADMIN — OXYBUTYNIN CHLORIDE 5 MG: 5 TABLET, EXTENDED RELEASE ORAL at 08:14

## 2021-08-02 RX ADMIN — HYDROCODONE BITARTRATE AND ACETAMINOPHEN 1 TABLET: 5; 325 TABLET ORAL at 14:14

## 2021-08-02 RX ADMIN — HEPARIN SODIUM 5000 UNITS: 5000 INJECTION INTRAVENOUS; SUBCUTANEOUS at 08:15

## 2021-08-02 RX ADMIN — DOCOSANOL: 100 CREAM TOPICAL at 17:44

## 2021-08-02 RX ADMIN — GABAPENTIN 800 MG: 400 CAPSULE ORAL at 08:19

## 2021-08-02 RX ADMIN — DOCOSANOL: 100 CREAM TOPICAL at 14:14

## 2021-08-02 RX ADMIN — METOPROLOL TARTRATE 100 MG: 50 TABLET, FILM COATED ORAL at 20:16

## 2021-08-02 RX ADMIN — SODIUM CHLORIDE 50 ML/HR: 9 INJECTION, SOLUTION INTRAVENOUS at 03:17

## 2021-08-02 RX ADMIN — SODIUM BICARBONATE 650 MG: 650 TABLET ORAL at 15:59

## 2021-08-02 RX ADMIN — HYDROCODONE BITARTRATE AND ACETAMINOPHEN 1 TABLET: 5; 325 TABLET ORAL at 20:16

## 2021-08-02 RX ADMIN — SODIUM CHLORIDE, PRESERVATIVE FREE 10 ML: 5 INJECTION INTRAVENOUS at 08:20

## 2021-08-02 RX ADMIN — PIPERACILLIN SODIUM AND TAZOBACTAM SODIUM 4.5 G: 4; .5 INJECTION, POWDER, LYOPHILIZED, FOR SOLUTION INTRAVENOUS at 11:30

## 2021-08-02 RX ADMIN — DOCOSANOL: 100 CREAM TOPICAL at 22:25

## 2021-08-02 RX ADMIN — INSULIN DETEMIR 45 UNITS: 100 INJECTION, SOLUTION SUBCUTANEOUS at 08:17

## 2021-08-02 NOTE — PROGRESS NOTES
"  Pharmacokinetics by Pharmacy - Vancomycin    Yamileth Slater is a 61 y.o. female  [Ht: 167.6 cm (66\"); Wt: 127 kg (280 lb)]    Estimated Creatinine Clearance: 29.1 mL/min (A) (by C-G formula based on SCr of 2.73 mg/dL (H)).   Creatinine   Date Value Ref Range Status   08/02/2021 2.73 (H) 0.57 - 1.00 mg/dL Final   08/01/2021 2.26 (H) 0.57 - 1.00 mg/dL Final   07/31/2021 2.04 (H) 0.57 - 1.00 mg/dL Final      Lab Results   Component Value Date    WBC 9.26 08/02/2021    WBC 8.90 08/01/2021    WBC 10.52 07/31/2021     C-Reactive Protein   Date Value Ref Range Status   08/02/2021 11.40 (H) 0.00 - 0.50 mg/dL Final   08/01/2021 12.38 (H) 0.00 - 0.50 mg/dL Final   01/14/2021 12.40 (H) 0.00 - 0.50 mg/dL Final     Lactate   Date Value Ref Range Status   07/30/2021 0.8 0.5 - 2.0 mmol/L Final   09/10/2018 1.3 0.5 - 2.0 mmol/L Final   09/10/2018 2.1 (C) 0.5 - 2.0 mmol/L Final       Temp Readings from Last 1 Encounters:   08/02/21 97.9 °F (36.6 °C) (Temporal)      Lab Results   Component Value Date    VANCOTROUGH 22.10 (H) 08/01/2021    VANCORANDOM 15.80 04/02/2021         Culture Results:  Microbiology Results (last 10 days)       Procedure Component Value - Date/Time    Blood Culture - Blood, Arm, Left [[id number]] Collected: 07/30/21 2011    Lab Status: Preliminary result Specimen: Blood from Arm, Left Updated: 08/01/21 2045     Blood Culture No growth at 2 days    Narrative:      Plated and returned to blood culture incubator at 2200    Blood Culture - Blood, Wrist, Right [[id number]] Collected: 07/30/21 2008    Lab Status: Preliminary result Specimen: Blood from Wrist, Right Updated: 08/01/21 2045     Blood Culture No growth at 2 days    COVID-19 and FLU A/B PCR - Swab, Nasopharynx [[id number]]  (Normal) Collected: 07/30/21 1929    Lab Status: Final result Specimen: Swab from Nasopharynx Updated: 07/30/21 2001     COVID19 Not Detected     Influenza A PCR Not Detected     Influenza B PCR Not Detected    Narrative:      " Fact sheet for providers: https://www.fda.gov/media/359958/download    Fact sheet for patients: https://www.fda.gov/media/050157/download    Test performed by PCR.          No results found for: RESPCX    Indication for use: skin and soft tissue infection      Current Vancomycin Dose:  1500 mg IVPB every 24 hours, day 3 of therapy.     Pt is also receiving Zosyn 4.5 gm IV every 8 hours.     Assessment/Plan:  Reviewed above labs and cultures.   Vancomycin trough level from 8/1 at 2248 is 22.1 (goal 10-20). Lab levels were drawn at the correct time. Dose was held.   WBC is 9.26. Cr is 2.73. CrCl= 29.1  Spoke with provider regarding the wound. She stated that today has been the first day that the wound was not worse.  Wanted to continue Vancomycin.    Asked provider if Zosyn can be changed to Cefepime due to worsening CrCl. Provider agreed.    Adjusted Vancomycin dose to 1250 mg IV every 24 hours to start at 1400.    Dosed Cefepime at 2 gm Iv every 24 hours to start at 2000.   Pharmacy will continue to monitor renal function and adjust dose accordingly.    Светлана Lal, PharmD   08/02/21 13:18 CDT

## 2021-08-02 NOTE — PLAN OF CARE
Goal Outcome Evaluation:      Pt has been doing well.leg has been marked.the edema and redness  continues.vss .will cont to monitor

## 2021-08-02 NOTE — PROGRESS NOTES
[facility] Medicine Services  INPATIENT PROGRESS NOTE    Length of Stay: 0  Date of Admission: 7/30/2021  Primary Care Physician: Rianna Macias MD    Subjective   Chief Complaint: Cellulitis, leg pain  HPI:  Continues with leg pain. She does not feel that it has improved over last 24 hours. However, when looking at her leg, she does agree that redness has improved some.     She denies chest pain, shortness of breath, cough, congestion.  She reports having chronic kidney disease stage IV and anemia with previous requirement for transfusion.  Denies fever, chills, blood in stool or urine.    Review of Systems     All pertinent negatives and positives are as above. All other systems have been reviewed and are negative unless otherwise stated.     Objective    Temp:  [96.9 °F (36.1 °C)-97.9 °F (36.6 °C)] 97.9 °F (36.6 °C)  Heart Rate:  [67-71] 68  Resp:  [18-20] 18  BP: (122-154)/(62-84) 154/62    Physical Exam  Vitals and nursing note reviewed.   Constitutional:       Appearance: Normal appearance.   HENT:      Head: Normocephalic.   Cardiovascular:      Rate and Rhythm: Normal rate and regular rhythm.      Pulses: Normal pulses.   Pulmonary:      Effort: Pulmonary effort is normal.      Breath sounds: Normal breath sounds.   Abdominal:      General: Bowel sounds are normal.      Palpations: Abdomen is soft.   Musculoskeletal:      Right lower leg: Edema present.      Left lower leg: Edema present.   Skin:     General: Skin is warm and dry.      Findings: Erythema (left lateral and posterior leg-slightly improved today) present.   Neurological:      General: No focal deficit present.      Mental Status: She is alert and oriented to person, place, and time.   Psychiatric:         Mood and Affect: Mood normal.         Behavior: Behavior normal.             Results Review:  I have reviewed the labs, radiology results, and diagnostic studies.    Laboratory Data:   Results from  last 7 days   Lab Units 08/02/21 0518 08/01/21 0544 07/31/21 2228 07/31/21 0541 07/31/21 0541 07/30/21 2011 07/30/21 2011   SODIUM mmol/L 129* 133*  --   --  136   < > 128*   POTASSIUM mmol/L 4.8 4.3 4.2   < > 3.5   < > 3.7   CHLORIDE mmol/L 100 103  --   --  101   < > 94*   CO2 mmol/L 20.0* 20.0*  --   --  24.0   < > 25.0   BUN mg/dL 42* 39*  --   --  39*   < > 41*   CREATININE mg/dL 2.73* 2.26*  --   --  2.04*   < > 2.08*   GLUCOSE mg/dL 268* 171*  --   --  235*   < > 305*   CALCIUM mg/dL 8.5* 8.1*  --   --  8.6   < > 8.7   BILIRUBIN mg/dL  --   --   --   --   --   --  0.2   ALK PHOS U/L  --   --   --   --   --   --  206*   ALT (SGPT) U/L  --   --   --   --   --   --  12   AST (SGOT) U/L  --   --   --   --   --   --  19   ANION GAP mmol/L 9.0 10.0  --   --  11.0   < > 9.0    < > = values in this interval not displayed.     Estimated Creatinine Clearance: 29.1 mL/min (A) (by C-G formula based on SCr of 2.73 mg/dL (H)).  Results from last 7 days   Lab Units 08/01/21 0544 07/31/21 2228 07/31/21 0541   MAGNESIUM mg/dL 2.3 1.8 1.4*         Results from last 7 days   Lab Units 08/02/21 0518 08/01/21 0544 07/31/21 0541 07/30/21 2011   WBC 10*3/mm3 9.26 8.90 10.52 9.11   HEMOGLOBIN g/dL 7.4* 7.2* 7.8* 8.2*   HEMATOCRIT % 22.9* 23.1* 24.7* 26.3*   PLATELETS 10*3/mm3 242 280 312 340           Culture Data:   Blood Culture   Date Value Ref Range Status   07/30/2021 No growth at 2 days  Preliminary   07/30/2021 No growth at 2 days  Preliminary     No results found for: URINECX  No results found for: RESPCX  No results found for: WOUNDCX  No results found for: STOOLCX  No components found for: BODYFLD    Radiology Data:   Imaging Results (Last 24 Hours)     ** No results found for the last 24 hours. **          I have reviewed the patient's current medications.     Assessment/Plan         Cellulitis of left leg-With noted failure of previous of IV antibiotic course. LE duplex study negative for DVT. Will continue  broad spectrum coverage. However, given worsening ERIKA will dc zosyn with change to cefepime. Continue vancomycin for now. CRP downtrending today.   CKD IV-Creatinine at baseline by review of chart. Creatinine with further increase today. Concern for relation to antibiotics.  DC zosyn as above. Pharmacy to continue dosing. Nephrology to see in consult.     CAD-Currently stable without chest pain or reported equivalent.Noted abnormal troponin with stable EKG. Suspect this is related to renal insufficiency. Continue cardiac monitoring.      COPD-Stable without exacerbation.     Anemia-acute on chronic secondary to CKD. No reported blood loss. Check stool for occult blood. Discussed with patient, will avoid transfusion as possible. Trend labs-if further decline, will need to transfuse.      Abnormal troponin-as above.     Electrolyte Imbalance-Mg improved today.        Discharge Planning: I expect patient to be discharged in 2-3 days.     Genevieve Raymundo MD     I confirmed that the patient's Advance Care Plan is present, code status is documented, or surrogate decision maker is listed in the patient's medical record.

## 2021-08-02 NOTE — CONSULTS
[facility]  [address] [place] [address]. [address]   - [phone]  F - [phone]     Consultation         PATIENT  DEMOGRAPHICS   PATIENT NAME: Yamileth Slater                      PHYSICIAN: BRIDGETT Hadley  : 1960  MRN: [id number]    Subjective   SUBJECTIVE   Referring Provider: Dr. Raymundo  Reason for Consultation: ERIKA  History of present illness: This is a 61-year-old female with a past medical history significant for COPD, CKD 4, CAD, DM 2, hypertension, morbid obesity, PVD who presented to the hospital on the  and was admitted for left lower extremity cellulitis.  She was admitted to the hospital and started on IV antibiotics.  Since admission she has also developed some acute kidney injury and nephrology is consulted today to help manage.she was recently at [facility] with copd exacerbation pna erika and uti. Cr came down to 2 at the last hospitalization    Past Medical History:   Diagnosis Date   • Anxiety    • Carotid artery stenosis    • Chronic obstructive lung disease (CMS/HCC)    • CKD (chronic kidney disease) stage 4, GFR 15-29 ml/min (CMS/[facility])    • Colonic polyp    • Coronary arteriosclerosis    • Diabetes mellitus (CMS/HCC)    • Diabetic neuropathy (CMS/[facility])    • GERD (gastroesophageal reflux disease)    • History of transfusion    • Hypercholesterolemia    • Hypertension    • Hypomagnesemia 2021    Monitor and replace   • Morbid obesity (CMS/[facility])    • Nephrolithiasis    • Peripheral vascular disease (CMS/[facility])    • Sleep apnea    • Substance abuse (CMS/[facility])    • Vitamin D deficiency      Past Surgical History:   Procedure Laterality Date   • CARDIAC CATHETERIZATION N/A 2020   • CARDIAC CATHETERIZATION N/A 2021    Procedure: Left Heart Cath;  Surgeon: Melba Romo MD;  Location: [facility] CATH INVASIVE LOCATION;  Service: Cardiology;  Laterality: N/A;   • CARDIAC CATHETERIZATION N/A 2021    Procedure: Percutaneous Coronary  "Intervention;  Surgeon: Russell Voss MD;  Location: [facility] CATH INVASIVE LOCATION;  Service: Cardiovascular;  Laterality: N/A;   • CARDIAC CATHETERIZATION N/A 4/30/2021    Procedure: Stent NIKIK coronary;  Surgeon: Russell Voss MD;  Location: [facility] CATH INVASIVE LOCATION;  Service: Cardiovascular;  Laterality: N/A;   • CAROTID STENT Left    • COLONOSCOPY     • COLONOSCOPY N/A 5/14/2021    Procedure: COLONOSCOPY;  Surgeon: Mingo Duarte MD;  Location: [facility] ENDOSCOPY;  Service: Gastroenterology;  Laterality: N/A;   • CORONARY ARTERY BYPASS GRAFT N/A 2013    CABG X 3   • CYSTOSCOPY BLADDER STONE LITHOTRIPSY Bilateral    • ENDOSCOPY N/A 4/12/2021    Procedure: ESOPHAGOGASTRODUODENOSCOPY;  Surgeon: Mingo Duarte MD;  Location: [facility] ENDOSCOPY;  Service: Gastroenterology;  Laterality: N/A;   • ENDOSCOPY N/A 5/14/2021    Procedure: ESOPHAGOGASTRODUODENOSCOPY;  Surgeon: Mingo Duarte MD;  Location: [facility] ENDOSCOPY;  Service: Gastroenterology;  Laterality: N/A;     Family History   Problem Relation Age of Onset   • Heart disease Mother    • Heart disease Father    • Heart disease Sister    • Heart disease Brother      Social History     Tobacco Use   • Smoking status: Light Tobacco Smoker     Packs/day: 0.25     Years: 46.00     Pack years: 11.50     Types: Cigarettes   • Smokeless tobacco: Never Used   • Tobacco comment: only smoking 5 a day    Substance Use Topics   • Alcohol use: No   • Drug use: Not Currently     Types: LSD, Marijuana, Methamphetamines     Allergies:  Adhesive tape and Other     REVIEW OF SYSTEMS    Review of Systems   Cardiovascular: Positive for leg swelling.   All other systems reviewed and are negative.      Objective   OBJECTIVE   Vital Signs  Temp:  [96.9 °F (36.1 °C)-97.9 °F (36.6 °C)] 97.9 °F (36.6 °C)  Heart Rate:  [68-71] 68  Resp:  [18-20] 18  BP: (130-154)/(62-63) 154/62    Flowsheet Rows      First Filed Value   Admission Height  167.6 cm (66\") " Documented at 07/30/2021 1853   Admission Weight  127 kg (280 lb) Documented at 07/30/2021 1853           I/O last 3 completed shifts:  In: 480 [P.O.:480]  Out: 2150 [Urine:2150]    PHYSICAL EXAM    Physical Exam  Constitutional:       Appearance: She is well-developed.   HENT:      Head: Normocephalic and atraumatic.   Eyes:      Conjunctiva/sclera: Conjunctivae normal.      Pupils: Pupils are equal, round, and reactive to light.   Cardiovascular:      Rate and Rhythm: Normal rate and regular rhythm.   Pulmonary:      Effort: Pulmonary effort is normal.      Breath sounds: Normal breath sounds.   Abdominal:      Palpations: Abdomen is soft.   Musculoskeletal:      Cervical back: Neck supple.      Right lower leg: Edema present.      Left lower leg: Edema present.   Skin:     General: Skin is warm and dry.      Findings: Erythema present.   Neurological:      Mental Status: She is alert and oriented to person, place, and time.   Psychiatric:         Mood and Affect: Mood normal.         Behavior: Behavior normal.         RESULTS   Results Review:    Results from last 7 days   Lab Units 08/02/21  0518 08/01/21  0544 07/31/21 2228 07/31/21  0541 07/31/21  0541 07/30/21 2011 07/30/21 2011   SODIUM mmol/L 129* 133*  --   --  136   < > 128*   POTASSIUM mmol/L 4.8 4.3 4.2   < > 3.5   < > 3.7   CHLORIDE mmol/L 100 103  --   --  101   < > 94*   CO2 mmol/L 20.0* 20.0*  --   --  24.0   < > 25.0   BUN mg/dL 42* 39*  --   --  39*   < > 41*   CREATININE mg/dL 2.73* 2.26*  --   --  2.04*   < > 2.08*   CALCIUM mg/dL 8.5* 8.1*  --   --  8.6   < > 8.7   BILIRUBIN mg/dL  --   --   --   --   --   --  0.2   ALK PHOS U/L  --   --   --   --   --   --  206*   ALT (SGPT) U/L  --   --   --   --   --   --  12   AST (SGOT) U/L  --   --   --   --   --   --  19   GLUCOSE mg/dL 268* 171*  --   --  235*   < > 305*    < > = values in this interval not displayed.       Estimated Creatinine Clearance: 29.1 mL/min (A) (by C-G formula based on SCr  of 2.73 mg/dL (H)).    Results from last 7 days   Lab Units 08/01/21  0544 07/31/21 2228 07/31/21  0541   MAGNESIUM mg/dL 2.3 1.8 1.4*             Results from last 7 days   Lab Units 08/02/21  0518 08/01/21  0544 07/31/21  0541 07/30/21 2011   WBC 10*3/mm3 9.26 8.90 10.52 9.11   HEMOGLOBIN g/dL 7.4* 7.2* 7.8* 8.2*   PLATELETS 10*3/mm3 242 280 312 340              MEDICATIONS    !Vancomycin Level Draw Needed, , Does not apply, Once  aspirin, 81 mg, Oral, Daily  atorvastatin, 40 mg, Oral, Daily  cefepime, 2 g, Intravenous, Q24H  clopidogrel, 75 mg, Oral, Daily  docosanol, , Topical, 5x Daily  DULoxetine, 20 mg, Oral, Daily  gabapentin, 800 mg, Oral, TID  heparin (porcine), 5,000 Units, Subcutaneous, Q12H  insulin detemir, 45 Units, Subcutaneous, Q12H  isosorbide mononitrate, 30 mg, Oral, Daily  metoprolol tartrate, 100 mg, Oral, Q12H  oxybutynin XL, 5 mg, Oral, Daily  sodium chloride, 10 mL, Intravenous, Q12H  vancomycin, 1,250 mg, Intravenous, Q24H      Pharmacy to Dose Cefepime,   Pharmacy to dose vancomycin,   sodium chloride, 50 mL/hr, Last Rate: 50 mL/hr (08/02/21 1415)      Medications Prior to Admission   Medication Sig Dispense Refill Last Dose   • albuterol sulfate  (90 Base) MCG/ACT inhaler Inhale 2 puffs Every 4 (Four) Hours As Needed for Wheezing. 18 g 1 7/30/2021 at Unknown time   • aspirin (aspirin) 81 MG EC tablet Take 1 tablet by mouth Daily. 60 tablet 5 7/30/2021 at Unknown time   • atorvastatin (LIPITOR) 40 MG tablet Take 1 tablet by mouth Daily. (Patient taking differently: Take 40 mg by mouth Every Night.) 90 tablet 0 7/30/2021 at Unknown time   • cetirizine (zyrTEC) 10 MG tablet Take 1 tablet by mouth Daily. 30 tablet 3 7/30/2021 at Unknown time   • clopidogrel (PLAVIX) 75 MG tablet Take 1 tablet by mouth Daily. 30 tablet 5 7/30/2021 at Unknown time   • cyclobenzaprine (FLEXERIL) 10 MG tablet Take 1 tablet by mouth 2 (Two) Times a Day As Needed for Muscle Spasms. 60 tablet 5 7/30/2021 at  Unknown time   • DULoxetine (CYMBALTA) 20 MG capsule TAKE 1 CAPSULE BY MOUTH DAILY. 30 capsule 2 7/30/2021 at Unknown time   • ferrous sulfate (FeroSul) 325 (65 FE) MG tablet Take 1 tablet by mouth Daily With Breakfast. 30 tablet 3 7/30/2021 at Unknown time   • furosemide (LASIX) 20 MG tablet TAKE 1 TABLET BY MOUTH DAILY. 30 tablet 0 7/30/2021 at Unknown time   • gabapentin (NEURONTIN) 800 MG tablet Take 1 tablet by mouth 3 (Three) Times a Day. 90 tablet 0 7/30/2021 at Unknown time   • insulin aspart (novoLOG FLEXPEN) 100 UNIT/ML solution pen-injector sc pen Inject 20 Units under the skin into the appropriate area as directed 3 (Three) Times a Day With Meals. 15 mL 1 7/30/2021 at Unknown time   • Insulin Pen Needle (NovoFine) 30G X 8 MM misc As directed 4 times daily 100 each 11 7/30/2021 at Unknown time   • ipratropium-albuterol (DUO-NEB) 0.5-2.5 mg/3 ml nebulizer Take 3 mL by nebulization 4 (Four) Times a Day.   Past Week at Unknown time   • isosorbide mononitrate (IMDUR) 30 MG 24 hr tablet Take 1 tablet by mouth Daily. 90 tablet 2 7/30/2021 at Unknown time   • lisinopril (PRINIVIL,ZESTRIL) 10 MG tablet Take 1 tablet by mouth Daily. 30 tablet 5 7/30/2021 at Unknown time   • montelukast (SINGULAIR) 10 MG tablet Take 1 tablet by mouth Every Night. 30 tablet 5 7/30/2021 at Unknown time   • nystatin (MYCOSTATIN) 795810 UNIT/GM powder Apply  topically to the appropriate area as directed 3 (Three) Times a Day. 15 g 1 Past Week at Unknown time   • ondansetron ODT (Zofran ODT) 4 MG disintegrating tablet Place 1 tablet on the tongue Every 8 (Eight) Hours As Needed for Nausea or Vomiting. 30 tablet 0 Past Month at Unknown time   • oxybutynin XL (DITROPAN-XL) 5 MG 24 hr tablet Take 1 tablet by mouth Daily. 90 tablet 1 7/30/2021 at Unknown time   • pantoprazole (PROTONIX) 40 MG EC tablet Take 1 tablet by mouth Every Night. 30 tablet 5 7/30/2021 at Unknown time   • sodium bicarbonate 650 MG tablet Take 1 tablet by mouth 2  (Two) Times a Day. 60 tablet 1 7/30/2021 at Unknown time   • Blood Glucose Monitoring Suppl ([employer] Blood Glucose Meter) w/Device kit 1 each 3 (Three) Times a Day. 1 kit 3    • Continuous Blood Gluc  (FreeStyle Jade 2 [place]) device 1 each Continuous. 1 each 0    • Continuous Blood Gluc Sensor (FreeStyle Jade 2 Sensor) misc 1 each Every 14 (Fourteen) Days. 2 each 11    • EASY TOUCH PEN NEEDLES 31G X 8 MM misc       • glucose blood test strip Use as instructed 100 each 12    • insulin detemir (LEVEMIR) 100 UNIT/ML injection Inject 45 Units under the skin into the appropriate area as directed Every 12 (Twelve) Hours for 30 days. 27 mL 11    • lidocaine (LIDODERM) 5 % PLACE 1 PATCH ON THE SKIN AS DIRECTED BY PROVIDER DAILY. REMOVE & DISCARD PATCH WITHIN 12 HOURS OR AS DIRECTED BY MD 30 patch 3 Unknown at Unknown time   • metoprolol tartrate (LOPRESSOR) 100 MG tablet Take 1 tablet by mouth Every 12 (Twelve) Hours for 30 days. (Patient taking differently: Take 100 mg by mouth 2 (Two) Times a Day.) 60 tablet 0    • nitroglycerin (NITROSTAT) 0.4 MG SL tablet Place 0.4 mg under the tongue Every 5 (Five) Minutes As Needed for Chest Pain (x 3 doses).   Unknown at Unknown time     Assessment/Plan   ASSESSMENT / PLAN      Cellulitis of left leg    1.  ERIKA on CKD 4- Baseline creatinine around 2.0, up to 2.7 now and will continue gentle IV hydration.  Observe response to vancomycin.  Zosyn has been discontinued and changed to cefepime. Will hold diuretics for now until renal function stabliizes. Check fena    2.  Hyponatremia- mild    3.  Left lower extremity cellulitis- on antibiotics per primary team, failed previous antibiotic course    4.  History of CAD    5.  History of COPD with recent copd exacerbation / pna at outside hospital     6.  Anemia- hemoglobin stable at 7.4, check anemia profile      Thank you for the consult, we will continue to follow the patient.         I discussed the patients findings and my  recommendations with patient      This document has been electronically signed by BRIDGETT Hadley on August 2, 2021 15:04 CDT      For this patient encounter, I have reviewed the Nurse Practitioner's documentation, medical decision making, and treatment plan and personally spent time with the patient.

## 2021-08-03 LAB
ANION GAP SERPL CALCULATED.3IONS-SCNC: 10 MMOL/L (ref 5–15)
BACTERIA UR QL AUTO: ABNORMAL /HPF
BASOPHILS # BLD AUTO: 0.06 10*3/MM3 (ref 0–0.2)
BASOPHILS NFR BLD AUTO: 0.6 % (ref 0–1.5)
BILIRUB UR QL STRIP: NEGATIVE
BUN SERPL-MCNC: 41 MG/DL (ref 8–23)
BUN/CREAT SERPL: 15.9 (ref 7–25)
CALCIUM SPEC-SCNC: 8.7 MG/DL (ref 8.6–10.5)
CHLORIDE SERPL-SCNC: 103 MMOL/L (ref 98–107)
CLARITY UR: CLEAR
CO2 SERPL-SCNC: 21 MMOL/L (ref 22–29)
COLOR UR: YELLOW
CREAT SERPL-MCNC: 2.58 MG/DL (ref 0.57–1)
CRP SERPL-MCNC: 13.29 MG/DL (ref 0–0.5)
DEPRECATED RDW RBC AUTO: 48.6 FL (ref 37–54)
EOSINOPHIL # BLD AUTO: 0.4 10*3/MM3 (ref 0–0.4)
EOSINOPHIL NFR BLD AUTO: 4.1 % (ref 0.3–6.2)
ERYTHROCYTE [DISTWIDTH] IN BLOOD BY AUTOMATED COUNT: 15.1 % (ref 12.3–15.4)
FERRITIN SERPL-MCNC: 144.5 NG/ML (ref 13–150)
GFR SERPL CREATININE-BSD FRML MDRD: 19 ML/MIN/1.73
GLUCOSE BLDC GLUCOMTR-MCNC: 239 MG/DL (ref 70–130)
GLUCOSE BLDC GLUCOMTR-MCNC: 301 MG/DL (ref 70–130)
GLUCOSE BLDC GLUCOMTR-MCNC: 346 MG/DL (ref 70–130)
GLUCOSE SERPL-MCNC: 228 MG/DL (ref 65–99)
GLUCOSE UR STRIP-MCNC: ABNORMAL MG/DL
HCT VFR BLD AUTO: 24.5 % (ref 34–46.6)
HGB BLD-MCNC: 7.6 G/DL (ref 12–15.9)
HGB UR QL STRIP.AUTO: NEGATIVE
HYALINE CASTS UR QL AUTO: ABNORMAL /LPF
IMM GRANULOCYTES # BLD AUTO: 0.09 10*3/MM3 (ref 0–0.05)
IMM GRANULOCYTES NFR BLD AUTO: 0.9 % (ref 0–0.5)
IRON 24H UR-MRATE: 28 MCG/DL (ref 37–145)
IRON SATN MFR SERPL: 12 % (ref 20–50)
KETONES UR QL STRIP: NEGATIVE
LEUKOCYTE ESTERASE UR QL STRIP.AUTO: NEGATIVE
LYMPHOCYTES # BLD AUTO: 2.33 10*3/MM3 (ref 0.7–3.1)
LYMPHOCYTES NFR BLD AUTO: 24 % (ref 19.6–45.3)
MCH RBC QN AUTO: 27.7 PG (ref 26.6–33)
MCHC RBC AUTO-ENTMCNC: 31 G/DL (ref 31.5–35.7)
MCV RBC AUTO: 89.4 FL (ref 79–97)
MONOCYTES # BLD AUTO: 0.61 10*3/MM3 (ref 0.1–0.9)
MONOCYTES NFR BLD AUTO: 6.3 % (ref 5–12)
NEUTROPHILS NFR BLD AUTO: 6.22 10*3/MM3 (ref 1.7–7)
NEUTROPHILS NFR BLD AUTO: 64.1 % (ref 42.7–76)
NITRITE UR QL STRIP: NEGATIVE
NRBC BLD AUTO-RTO: 0 /100 WBC (ref 0–0.2)
PH UR STRIP.AUTO: 6 [PH] (ref 5–9)
PLATELET # BLD AUTO: 260 10*3/MM3 (ref 140–450)
PMV BLD AUTO: 8.8 FL (ref 6–12)
POTASSIUM SERPL-SCNC: 4.8 MMOL/L (ref 3.5–5.2)
PROT UR QL STRIP: ABNORMAL
RBC # BLD AUTO: 2.74 10*6/MM3 (ref 3.77–5.28)
RBC # UR: ABNORMAL /HPF
REF LAB TEST METHOD: ABNORMAL
SODIUM SERPL-SCNC: 134 MMOL/L (ref 136–145)
SP GR UR STRIP: 1.01 (ref 1–1.03)
SQUAMOUS #/AREA URNS HPF: ABNORMAL /HPF
TIBC SERPL-MCNC: 228 MCG/DL (ref 298–536)
TRANSFERRIN SERPL-MCNC: 153 MG/DL (ref 200–360)
UROBILINOGEN UR QL STRIP: ABNORMAL
WBC # BLD AUTO: 9.71 10*3/MM3 (ref 3.4–10.8)
WBC UR QL AUTO: ABNORMAL /HPF
YEAST URNS QL MICRO: ABNORMAL /HPF

## 2021-08-03 PROCEDURE — 83540 ASSAY OF IRON: CPT | Performed by: INTERNAL MEDICINE

## 2021-08-03 PROCEDURE — 25010000002 MORPHINE PER 10 MG: Performed by: INTERNAL MEDICINE

## 2021-08-03 PROCEDURE — 82962 GLUCOSE BLOOD TEST: CPT

## 2021-08-03 PROCEDURE — 85025 COMPLETE CBC W/AUTO DIFF WBC: CPT | Performed by: FAMILY MEDICINE

## 2021-08-03 PROCEDURE — 63710000001 INSULIN DETEMIR PER 5 UNITS: Performed by: FAMILY MEDICINE

## 2021-08-03 PROCEDURE — 63710000001 INSULIN DETEMIR PER 5 UNITS: Performed by: INTERNAL MEDICINE

## 2021-08-03 PROCEDURE — 84466 ASSAY OF TRANSFERRIN: CPT | Performed by: INTERNAL MEDICINE

## 2021-08-03 PROCEDURE — 82728 ASSAY OF FERRITIN: CPT | Performed by: INTERNAL MEDICINE

## 2021-08-03 PROCEDURE — 80048 BASIC METABOLIC PNL TOTAL CA: CPT | Performed by: FAMILY MEDICINE

## 2021-08-03 PROCEDURE — 25010000002 VANCOMYCIN 5 G RECONSTITUTED SOLUTION: Performed by: FAMILY MEDICINE

## 2021-08-03 PROCEDURE — 97166 OT EVAL MOD COMPLEX 45 MIN: CPT

## 2021-08-03 PROCEDURE — 25010000002 CEFEPIME PER 500 MG: Performed by: FAMILY MEDICINE

## 2021-08-03 PROCEDURE — 63710000001 INSULIN ASPART PER 5 UNITS: Performed by: INTERNAL MEDICINE

## 2021-08-03 PROCEDURE — 86140 C-REACTIVE PROTEIN: CPT | Performed by: FAMILY MEDICINE

## 2021-08-03 PROCEDURE — 25010000002 HYDROMORPHONE 1 MG/ML SOLUTION: Performed by: INTERNAL MEDICINE

## 2021-08-03 PROCEDURE — 25010000002 HEPARIN (PORCINE) PER 1000 UNITS: Performed by: INTERNAL MEDICINE

## 2021-08-03 RX ORDER — DEXTROSE MONOHYDRATE 25 G/50ML
25 INJECTION, SOLUTION INTRAVENOUS
Status: DISCONTINUED | OUTPATIENT
Start: 2021-08-03 | End: 2021-08-12 | Stop reason: HOSPADM

## 2021-08-03 RX ADMIN — INSULIN DETEMIR 50 UNITS: 100 INJECTION, SOLUTION SUBCUTANEOUS at 22:36

## 2021-08-03 RX ADMIN — NYSTATIN: 100000 POWDER TOPICAL at 06:47

## 2021-08-03 RX ADMIN — ATORVASTATIN CALCIUM 40 MG: 40 TABLET, FILM COATED ORAL at 08:14

## 2021-08-03 RX ADMIN — METOPROLOL TARTRATE 100 MG: 50 TABLET, FILM COATED ORAL at 08:14

## 2021-08-03 RX ADMIN — GABAPENTIN 800 MG: 400 CAPSULE ORAL at 15:12

## 2021-08-03 RX ADMIN — VANCOMYCIN HYDROCHLORIDE 1250 MG: 5 INJECTION, POWDER, LYOPHILIZED, FOR SOLUTION INTRAVENOUS at 15:03

## 2021-08-03 RX ADMIN — HEPARIN SODIUM 5000 UNITS: 5000 INJECTION INTRAVENOUS; SUBCUTANEOUS at 08:15

## 2021-08-03 RX ADMIN — DOCOSANOL: 100 CREAM TOPICAL at 17:22

## 2021-08-03 RX ADMIN — GABAPENTIN 800 MG: 400 CAPSULE ORAL at 08:14

## 2021-08-03 RX ADMIN — SODIUM BICARBONATE 650 MG: 650 TABLET ORAL at 20:52

## 2021-08-03 RX ADMIN — HYDROMORPHONE HYDROCHLORIDE 0.5 MG: 1 INJECTION, SOLUTION INTRAMUSCULAR; INTRAVENOUS; SUBCUTANEOUS at 20:55

## 2021-08-03 RX ADMIN — DOCOSANOL: 100 CREAM TOPICAL at 22:39

## 2021-08-03 RX ADMIN — METOPROLOL TARTRATE 100 MG: 50 TABLET, FILM COATED ORAL at 20:52

## 2021-08-03 RX ADMIN — INSULIN ASPART 9 UNITS: 100 INJECTION, SOLUTION INTRAVENOUS; SUBCUTANEOUS at 22:36

## 2021-08-03 RX ADMIN — SODIUM BICARBONATE 650 MG: 650 TABLET ORAL at 08:14

## 2021-08-03 RX ADMIN — CLOPIDOGREL BISULFATE 75 MG: 75 TABLET ORAL at 08:14

## 2021-08-03 RX ADMIN — DULOXETINE HYDROCHLORIDE 20 MG: 20 CAPSULE, DELAYED RELEASE ORAL at 08:14

## 2021-08-03 RX ADMIN — CEFEPIME HYDROCHLORIDE 2 G: 2 INJECTION, POWDER, FOR SOLUTION INTRAVENOUS at 20:05

## 2021-08-03 RX ADMIN — GABAPENTIN 800 MG: 400 CAPSULE ORAL at 20:52

## 2021-08-03 RX ADMIN — SODIUM CHLORIDE 50 ML/HR: 9 INJECTION, SOLUTION INTRAVENOUS at 17:21

## 2021-08-03 RX ADMIN — HYDROCODONE BITARTRATE AND ACETAMINOPHEN 1 TABLET: 5; 325 TABLET ORAL at 03:33

## 2021-08-03 RX ADMIN — SODIUM BICARBONATE 650 MG: 650 TABLET ORAL at 15:12

## 2021-08-03 RX ADMIN — DOCOSANOL: 100 CREAM TOPICAL at 06:46

## 2021-08-03 RX ADMIN — INSULIN DETEMIR 45 UNITS: 100 INJECTION, SOLUTION SUBCUTANEOUS at 08:19

## 2021-08-03 RX ADMIN — SODIUM CHLORIDE, PRESERVATIVE FREE 10 ML: 5 INJECTION INTRAVENOUS at 20:52

## 2021-08-03 RX ADMIN — HEPARIN SODIUM 5000 UNITS: 5000 INJECTION INTRAVENOUS; SUBCUTANEOUS at 20:52

## 2021-08-03 RX ADMIN — OXYBUTYNIN CHLORIDE 5 MG: 5 TABLET, EXTENDED RELEASE ORAL at 08:14

## 2021-08-03 RX ADMIN — HYDROCODONE BITARTRATE AND ACETAMINOPHEN 1 TABLET: 5; 325 TABLET ORAL at 11:03

## 2021-08-03 RX ADMIN — DOCOSANOL: 100 CREAM TOPICAL at 11:04

## 2021-08-03 RX ADMIN — ASPIRIN 81 MG: 81 TABLET, FILM COATED ORAL at 08:14

## 2021-08-03 RX ADMIN — ISOSORBIDE MONONITRATE 30 MG: 30 TABLET, EXTENDED RELEASE ORAL at 08:14

## 2021-08-03 RX ADMIN — DOCOSANOL: 100 CREAM TOPICAL at 15:12

## 2021-08-03 RX ADMIN — HYDROMORPHONE HYDROCHLORIDE 0.25 MG: 1 INJECTION, SOLUTION INTRAMUSCULAR; INTRAVENOUS; SUBCUTANEOUS at 06:45

## 2021-08-03 RX ADMIN — MORPHINE SULFATE 1 MG: 2 INJECTION, SOLUTION INTRAMUSCULAR; INTRAVENOUS at 12:02

## 2021-08-03 RX ADMIN — HYDROMORPHONE HYDROCHLORIDE 0.5 MG: 1 INJECTION, SOLUTION INTRAMUSCULAR; INTRAVENOUS; SUBCUTANEOUS at 15:04

## 2021-08-03 RX ADMIN — SODIUM CHLORIDE, PRESERVATIVE FREE 10 ML: 5 INJECTION INTRAVENOUS at 08:16

## 2021-08-03 NOTE — PROGRESS NOTES
"Pharmacokinetics by Pharmacy - Vancomycin    Yamileth Slater is a 61 y.o. female receiving vancomycin 1250mg IV Q24H day 4 for skin and soft tissue infection  Patient is also receiving cefepime    Objective:  [Ht: 167.6 cm (66\"); Wt: 127 kg (280 lb)]     WBC   Date Value Ref Range Status   08/03/2021 9.71 3.40 - 10.80 10*3/mm3 Final   08/02/2021 9.26 3.40 - 10.80 10*3/mm3 Final   08/01/2021 8.90 3.40 - 10.80 10*3/mm3 Final      C-Reactive Protein   Date Value Ref Range Status   08/03/2021 13.29 (H) 0.00 - 0.50 mg/dL Final   08/02/2021 11.40 (H) 0.00 - 0.50 mg/dL Final   08/01/2021 12.38 (H) 0.00 - 0.50 mg/dL Final     Lactate   Date Value Ref Range Status   07/30/2021 0.8 0.5 - 2.0 mmol/L Final   09/10/2018 1.3 0.5 - 2.0 mmol/L Final   09/10/2018 2.1 (C) 0.5 - 2.0 mmol/L Final      Temp Readings from Last 1 Encounters:   08/03/21 98 °F (36.7 °C) (Temporal)     Estimated Creatinine Clearance: 30.8 mL/min (A) (by C-G formula based on SCr of 2.58 mg/dL (H)).   Creatinine   Date Value Ref Range Status   08/03/2021 2.58 (H) 0.57 - 1.00 mg/dL Final   08/02/2021 2.73 (H) 0.57 - 1.00 mg/dL Final   08/01/2021 2.26 (H) 0.57 - 1.00 mg/dL Final       Vancomycin Trough   Date Value Ref Range Status   08/01/2021 22.10 (H) 5.00 - 20.00 mcg/mL Final   03/20/2017 13.25 10.00 - 15.00 mcg/mL Final     Vancomycin Random   Date Value Ref Range Status   04/02/2021 15.80 5.00 - 40.00 mcg/mL Final       Culture Results:  Microbiology Results (last 10 days)       Procedure Component Value - Date/Time    Blood Culture - Blood, Arm, Left [[id number]] Collected: 07/30/21 2011    Lab Status: Preliminary result Specimen: Blood from Arm, Left Updated: 08/02/21 2045     Blood Culture No growth at 3 days    Narrative:      Plated and returned to blood culture incubator at 2200    Blood Culture - Blood, Wrist, Right [[id number]] Collected: 07/30/21 2008    Lab Status: Preliminary result Specimen: Blood from Wrist, Right Updated: 08/02/21 2045     " Blood Culture No growth at 3 days    COVID-19 and FLU A/B PCR - Swab, Nasopharynx [[id number]]  (Normal) Collected: 07/30/21 1929    Lab Status: Final result Specimen: Swab from Nasopharynx Updated: 07/30/21 2001     COVID19 Not Detected     Influenza A PCR Not Detected     Influenza B PCR Not Detected    Narrative:      Fact sheet for providers: https://www.fda.gov/media/703543/download    Fact sheet for patients: https://www.fda.gov/media/460276/download    Test performed by PCR.          No results found for: RESPCX      Assessment:  WBC remain WNL, SCr improvewd from yesterday  Afebrile, CRP has increased since admission now 13.29  Last scheduled dose tomorrow  No more levels planned    Plan:  1. Continue vancomycin 1250mg IV Q24H  2.  No further levels planned as consult expires tomorrow  3. Pharmacy will monitor renal function and adjust dose accordingly.      Edgard Santos, PharmD   08/03/21 12:53 CDT

## 2021-08-03 NOTE — THERAPY EVALUATION
Patient Name: Yamileth Slater  : 1960    MRN: [id number]                              Today's Date: 8/3/2021       Admit Date: 2021    Visit Dx:     ICD-10-CM ICD-9-CM   1. Cellulitis of left leg  L03.116 682.6   2. Anemia, unspecified type  D64.9 285.9   3. Chronic kidney disease, unspecified CKD stage  N18.9 585.9   4. Type 2 diabetes mellitus with hyperglycemia, unspecified whether long term insulin use (CMS/[facility])  E11.65 250.00   5. Impaired mobility and ADLs  Z74.09 V49.89    Z78.9      Patient Active Problem List   Diagnosis   • Type 2 diabetes mellitus with diabetic polyneuropathy, with long-term current use of insulin (CMS/[facility])   • Chronic obstructive pulmonary disease (CMS/[facility])   • Chronic midline low back pain without sciatica   • Vitamin D deficiency   • Type 2 diabetes mellitus (CMS/[facility])   • Tobacco dependence syndrome   • Surgical follow-up care   • Shoulder joint pain   • Peripheral vascular disease (CMS/[facility])   • Pain   • Neurologic disorder associated with diabetes mellitus (CMS/[facility])   • Morbid obesity with BMI of 50.0-59.9, adult (CMS/[facility])   • Mixed hyperlipidemia   • Kidney stone   • History of colon polyps   • GERD (gastroesophageal reflux disease)   • Excoriated eczema   • Essential hypertension   • Encounter for medication refill   • Dyslipidemia   • Diabetes mellitus (CMS/[facility])   • Coronary artery disease   • Chronic obstructive lung disease (CMS/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)   • Anemia   • Hypothyroidism   • Carotid artery disease (CMS/[facility])   • Current smoker   • DM type 2 with diabetic peripheral neuropathy (CMS/[facility])   • [place] abuse   • PAD (peripheral artery disease) (CMS/[facility])   • S/P CABG x 3   • Intercostal pain   • Venous insufficiency   • Dyspnea on exertion   •  LAFB (left anterior fascicular block)   • Pulmonary hypertension (CMS/[facility])   • Left hip pain   • Neck pain   • Acute kidney injury superimposed on chronic kidney disease (CMS/[facility])   • MIKE and COPD overlap syndrome (CMS/[facility])   • Pneumonia due to COVID-19 virus   • CKD (chronic kidney disease) stage 4, GFR 15-29 ml/min (CMS/[facility])   • Morbid obesity (CMS/[facility])   • Type 2 diabetes mellitus with hyperglycemia, with long-term current use of insulin (CMS/[facility])   • Hyponatremia   • Hyperkalemia   • Anemia   • Cutaneous candidiasis   • Community acquired pneumonia of right middle lobe of lung   • MRSA infection   • Alkaline phosphatase elevation   • COVID-19 ruled out by laboratory testing   • Esophageal dysphagia   • Monilial esophagitis (CMS/[facility])   • NSTEMI, initial episode of care (CMS/[facility])   • CAD (coronary artery disease)   • Gastrointestinal hemorrhage   • Chronic respiratory failure with hypoxia (CMS/[facility])   • Pneumonia due to infectious organism   • Chronic hypercapnic respiratory failure (CMS/[facility])   • Cellulitis of left leg     Past Medical History:   Diagnosis Date   • Anxiety    • Carotid artery stenosis    • Chronic obstructive lung disease (CMS/[facility])    • CKD (chronic kidney disease) stage 4, GFR 15-29 ml/min (CMS/[facility])    • Colonic polyp    • Coronary arteriosclerosis    • Diabetes mellitus (CMS/[facility])    • Diabetic neuropathy (CMS/[facility])    • GERD (gastroesophageal reflux disease)    • History of transfusion    • Hypercholesterolemia    • Hypertension    • Hypomagnesemia 6/27/2021    Monitor and replace   • Morbid obesity (CMS/[facility])    • Nephrolithiasis    • Peripheral vascular disease (CMS/[facility])    • Sleep apnea    • Substance abuse (CMS/[facility])    • Vitamin D deficiency      Past Surgical History:   Procedure Laterality Date   • CARDIAC CATHETERIZATION N/A 7/14/2020   • CARDIAC CATHETERIZATION N/A 4/23/2021    Procedure: Left Heart Cath;  Surgeon: Melba Romo MD;  Location: [facility] CATH INVASIVE LOCATION;  Service:  Cardiology;  Laterality: N/A;   • CARDIAC CATHETERIZATION N/A 4/30/2021    Procedure: Percutaneous Coronary Intervention;  Surgeon: Russell Voss MD;  Location: [facility] CATH INVASIVE LOCATION;  Service: Cardiovascular;  Laterality: N/A;   • CARDIAC CATHETERIZATION N/A 4/30/2021    Procedure: Stent NIKKI coronary;  Surgeon: Russell Voss MD;  Location: [facility] CATH INVASIVE LOCATION;  Service: Cardiovascular;  Laterality: N/A;   • CAROTID STENT Left    • COLONOSCOPY     • COLONOSCOPY N/A 5/14/2021    Procedure: COLONOSCOPY;  Surgeon: Mingo Duarte MD;  Location: [facility] ENDOSCOPY;  Service: Gastroenterology;  Laterality: N/A;   • CORONARY ARTERY BYPASS GRAFT N/A 2013    CABG X 3   • CYSTOSCOPY BLADDER STONE LITHOTRIPSY Bilateral    • ENDOSCOPY N/A 4/12/2021    Procedure: ESOPHAGOGASTRODUODENOSCOPY;  Surgeon: Mingo Duarte MD;  Location: [facility] ENDOSCOPY;  Service: Gastroenterology;  Laterality: N/A;   • ENDOSCOPY N/A 5/14/2021    Procedure: ESOPHAGOGASTRODUODENOSCOPY;  Surgeon: Mingo Duarte MD;  Location: [facility] ENDOSCOPY;  Service: Gastroenterology;  Laterality: N/A;     General Information     Row Name 08/03/21 1409          OT Time and Intention    Document Type  evaluation  -ME     Mode of Treatment  individual therapy;occupational therapy  -ME     Row Name 08/03/21 1409          General Information    Patient Profile Reviewed  yes  -ME     Prior Level of Function  independent:;all household mobility;dressing;mod assist:;bathing;dependent:;driving  -ME     Existing Precautions/Restrictions  fall;oxygen therapy device and L/min  -ME     Barriers to Rehab  medically complex;previous functional deficit  -ME     Row Name 08/03/21 1409          Living Environment    Lives With  spouse  -ME     Row Name 08/03/21 1409          Home Main Entrance    Number of Stairs, Main Entrance  none  -ME     Row Name 08/03/21 1409          Stairs Within Home, Primary    Stairs, Within Home, Primary  has a  rollator and a standard walker; tub shower with a shower chair; grab bars in shower; raised toilet; wears O2 at home, 3 L;  -ME     Number of Stairs, Within Home, Primary  none  -ME     Row Name 08/03/21 1409          Cognition    Orientation Status (Cognition)  oriented x 4  -ME     Row Name 08/03/21 1409          Safety Issues, Functional Mobility    Safety Issues Affecting Function (Mobility)  awareness of need for assistance;safety precaution awareness;safety precautions follow-through/compliance;insight into deficits/self-awareness  -ME     Impairments Affecting Function (Mobility)  balance;endurance/activity tolerance;strength;pain  -ME       User Key  (r) = Recorded By, (t) = Taken By, (c) = Cosigned By    Initials Name Provider Type    ME Nikole Espino OTR/L Occupational Therapist          Mobility/ADL's     Row Name 08/03/21 1409          Bed Mobility    Bed Mobility  supine-sit;sit-supine  -ME     Supine-Sit [place] (Bed Mobility)  minimum assist (75% patient effort)  -ME     Sit-Supine [place] (Bed Mobility)  minimum assist (75% patient effort)  -ME     Assistive Device (Bed Mobility)  bed rails;head of bed elevated  -ME     Comment (Bed Mobility)  assistance for BLE and pain  -ME     Row Name 08/03/21 1409          Transfers    Transfers  sit-stand transfer;toilet transfer  -ME     Sit-Stand [place] (Transfers)  contact guard  -ME     [place] Level (Toilet Transfer)  contact guard  -ME     Assistive Device (Toilet Transfer)  commode, bedside without drop arms;other (see comments) hand held  -ME     Row Name 08/03/21 1409          Sit-Stand Transfer    Assistive Device (Sit-Stand Transfers)  other (see comments) hand held  -ME     Row Name 08/03/21 1409          Toilet Transfer    Type (Toilet Transfer)  sit-stand;stand-sit  -ME     Row Name 08/03/21 1409          Functional Mobility    Functional Mobility- Ind. Level  contact guard assist  -ME     Functional Mobility- Device  other  (see comments) hand held  -ME     Functional Mobility- Comment  short distance at EOB, pt states that she cannot put weight on her LLE secondary to pain, but was able to put weight on the leg for short distance mobility  -ME     Row Name 08/03/21 1409          Activities of Daily Living    BADL Assessment/Intervention  toileting  -ME     Row Name 08/03/21 1409          Toileting Assessment/Training    [place] Level (Toileting)  adjust/manage clothing;perform perineal hygiene;dependent (less than 25% patient effort)  -ME     Assistive Devices (Toileting)  commode, bedside without drop arms  -ME     Position (Toileting)  unsupported standing;supported sitting  -ME       User Key  (r) = Recorded By, (t) = Taken By, (c) = Cosigned By    Initials Name Provider Type    Nikole Crawford OTR/L Occupational Therapist        Obj/Interventions     Row Name 08/03/21 1409          Sensory Assessment (Somatosensory)    Sensory Assessment (Somatosensory)  other (see comments)  -ME     Row Name 08/03/21 1409          Sensory Interventions    Comment, Sensory Intervention  states that she has numbness and tingling in fingers on both hands  -ME     Row Name 08/03/21 1409          Range of Motion Comprehensive    General Range of Motion  no range of motion deficits identified;bilateral upper extremity ROM WFL  -ME     Row Name 08/03/21 1409          Strength Comprehensive (MMT)    General Manual Muscle Testing (MMT) Assessment  other (see comments)  -ME     Comment, General Manual Muscle Testing (MMT) Assessment  BUE strength and bilateral  strength are grossly 4- to 4/5; pt is left handed  -ME       User Key  (r) = Recorded By, (t) = Taken By, (c) = Cosigned By    Initials Name Provider Type    Nikole Crawford OTR/L Occupational Therapist        Goals/Plan     Row Name 08/03/21 1409          Transfer Goal 1 (OT)    Activity/Assistive Device (Transfer Goal 1, OT)  toilet  -ME     [place] Level/Cues Needed (Transfer  Goal 1, OT)  supervision required  -ME     Time Frame (Transfer Goal 1, OT)  long term goal (LTG);by discharge  -ME     Progress/Outcome (Transfer Goal 1, OT)  goal not met  -ME     Row Name 08/03/21 1409          Bathing Goal 1 (OT)    Activity/Device (Bathing Goal 1, OT)  upper body bathing AD as needed  -ME     [place] Level/Cues Needed (Bathing Goal 1, OT)  set-up required;supervision required  -ME     Time Frame (Bathing Goal 1, OT)  long term goal (LTG);by discharge  -ME     Progress/Outcomes (Bathing Goal 1, OT)  goal not met  -ME     Row Name 08/03/21 1409          Dressing Goal 1 (OT)    Activity/Device (Dressing Goal 1, OT)  upper body dressing AD as needed  -ME     [place]/Cues Needed (Dressing Goal 1, OT)  supervision required;set-up required  -ME     Time Frame (Dressing Goal 1, OT)  long term goal (LTG);by discharge  -ME     Progress/Outcome (Dressing Goal 1, OT)  goal not met  -ME     Row Name 08/03/21 1409          Toileting Goal 1 (OT)    Activity/Device (Toileting Goal 1, OT)  toileting skills, all  -ME     [place] Level/Cues Needed (Toileting Goal 1, OT)  set-up required;supervision required  -ME     Time Frame (Toileting Goal 1, OT)  long term goal (LTG);by discharge  -ME     Progress/Outcome (Toileting Goal 1, OT)  goal not met  -ME     Row Name 08/03/21 1409          Therapy Assessment/Plan (OT)    Planned Therapy Interventions (OT)  activity tolerance training;adaptive equipment training;BADL retraining;functional balance retraining;IADL retraining;occupation/activity based interventions;patient/caregiver education/training;ROM/therapeutic exercise;strengthening exercise;transfer/mobility retraining  -ME       User Key  (r) = Recorded By, (t) = Taken By, (c) = Cosigned By    Initials Name Provider Type    Nikole Crawford OTR/L Occupational Therapist        Clinical Impression     Row Name 08/03/21 1409          Pain Assessment    Additional Documentation  Pain Scale: Numbers  Pre/Post-Treatment (Group)  -ME     Row Name 08/03/21 1409          Pain Scale: Numbers Pre/Post-Treatment    Pretreatment Pain Rating  7/10  -ME     Posttreatment Pain Rating  7/10  -ME     Pain Location - Side  Left  -ME     Pain Location - Orientation  lower  -ME     Pain Location  extremity  -ME     Pain Intervention(s)  Repositioned;Ambulation/increased activity;Distraction  -ME     Row Name 08/03/21 1409          Plan of Care Review    Plan of Care Reviewed With  patient  -ME     Row Name 08/03/21 1409          Therapy Assessment/Plan (OT)    Patient/Family Therapy Goal Statement (OT)  to return home  -ME     Rehab Potential (OT)  good, to achieve stated therapy goals  -ME     Criteria for Skilled Therapeutic Interventions Met (OT)  yes;meets criteria;skilled treatment is necessary  -ME     Therapy Frequency (OT)  other (see comments) 5-7 days per week  -ME     Predicted Duration of Therapy Intervention (OT)  until d/c or all goals met  -ME     Row Name 08/03/21 1409          Therapy Plan Review/Discharge Plan (OT)    Anticipated Discharge Disposition (OT)  home;home with assist;home with home health  -ME     Row Name 08/03/21 1409          Vital Signs    Pre Systolic BP Rehab  152  -ME     Pre Treatment Diastolic BP  82  -ME     Pretreatment Heart Rate (beats/min)  65  -ME     Pre SpO2 (%)  97 on 3 L  -ME     O2 Delivery Pre Treatment  nasal cannula  -ME     O2 Delivery Post Treatment  nasal cannula  -ME     Pre Patient Position  Supine  -ME     Post Patient Position  Supine  -ME     Row Name 08/03/21 1409          Positioning and Restraints    Pre-Treatment Position  in bed  -ME     Post Treatment Position  bed  -ME     In Bed  notified nsg;fowlers;call light within reach;encouraged to call for assist;exit alarm on  -ME       User Key  (r) = Recorded By, (t) = Taken By, (c) = Cosigned By    Initials Name Provider Type    Nikole Crawford, OTR/L Occupational Therapist        Outcome Measures     Row Name  08/03/21 1409          How much help from another is currently needed...    Putting on and taking off regular lower body clothing?  2  -ME     Bathing (including washing, rinsing, and drying)  2  -ME     Toileting (which includes using toilet bed pan or urinal)  1  -ME     Putting on and taking off regular upper body clothing  3  -ME     Taking care of personal grooming (such as brushing teeth)  3  -ME     Eating meals  3  -ME     AM-PAC 6 Clicks Score (OT)  14  -ME     Row Name 08/03/21 1409          Functional Assessment    Outcome Measure Options  AM-PAC 6 Clicks Daily Activity (OT)  -ME       User Key  (r) = Recorded By, (t) = Taken By, (c) = Cosigned By    Initials Name Provider Type    Nikole Crawford OTR/L Occupational Therapist          Occupational Therapy Education                 Title: PT OT SLP Therapies (Done)     Topic: Occupational Therapy (Done)     Point: ADL training (Done)     Description:   Instruct learner(s) on proper safety adaptation and remediation techniques during self care or transfers.   Instruct in proper use of assistive devices.              Learning Progress Summary           Patient Acceptance, E,TB, VU by SC at 7/31/2021 1007                   Point: Home exercise program (Done)     Description:   Instruct learner(s) on appropriate technique for monitoring, assisting and/or progressing therapeutic exercises/activities.              Learning Progress Summary           Patient Acceptance, E,TB, VU by SC at 7/31/2021 1007                   Point: Precautions (Done)     Description:   Instruct learner(s) on prescribed precautions during self-care and functional transfers.              Learning Progress Summary           Patient Acceptance, E, VU by ME at 8/3/2021 1508    Comment: Educated on OT and POC. Educated to call for assistance. Educated on safety precautions.    Acceptance, E,TB, VU by SC at 7/31/2021 1007                   Point: Body mechanics (Done)     Description:    Instruct learner(s) on proper positioning and spine alignment during self-care, functional mobility activities and/or exercises.              Learning Progress Summary           Patient Acceptance, E, VU by ME at 8/3/2021 1508    Comment: Educated on OT and POC. Educated to call for assistance. Educated on safety precautions.    Acceptance, E,TB, VU by SC at 7/31/2021 1007                               User Key     Initials Effective Dates Name Provider Type Discipline    ME 06/16/21 -  Nikole Espino OTR/L Occupational Therapist OT    SC 07/21/21 -  Angela West RN Registered Nurse Nurse              OT Recommendation and Plan  Planned Therapy Interventions (OT): activity tolerance training, adaptive equipment training, BADL retraining, functional balance retraining, IADL retraining, occupation/activity based interventions, patient/caregiver education/training, ROM/therapeutic exercise, strengthening exercise, transfer/mobility retraining  Therapy Frequency (OT): other (see comments) (5-7 days per week)  Plan of Care Review  Plan of Care Reviewed With: patient  Outcome Summary: initial OT evaluation complete. pt was pleasant and cooperative throughout, some encouragement required for participation. pt is having 7/10 LLE pain, and states that she is unable to put weight on it secondary to this, though she puts weight on it for mobility and transfers. pt was min A for bed mobility. CGA for transfers and functional mobility with hand held assistance. dependent for toileting tasks. BUE ROM WFL grossly. BUE strength and bilateral  strength are grossly 4- to 4/5. recommend further skilled OT services to address functional mobility and ADL Deficits, as well as balance, strength, and endurance. recommend home with assistance and home health OT at discharge. goals established.     Time Calculation:   Time Calculation- OT     Row Name 08/03/21 1515             Time Calculation- OT    OT Start Time  1409  -ME       OT Stop Time  1457  -ME      OT Time Calculation (min)  48 min  -ME      OT Received On  08/03/21  -ME      OT Goal Re-Cert Due Date  08/16/21  -ME         Untimed Charges    OT Eval/Re-eval Minutes  48  -ME         Total Minutes    Untimed Charges Total Minutes  48  -ME       Total Minutes  48  -ME        User Key  (r) = Recorded By, (t) = Taken By, (c) = Cosigned By    Initials Name Provider Type    ME Nikole Espino, OTR/L Occupational Therapist        Therapy Charges for Today     Code Description Service Date Service Provider Modifiers Qty    [id number]  OT EVAL MOD COMPLEXITY 3 8/3/2021 Nikole Espino, OTR/L GO 1               Nikole Espino OTR/AISHWARYA  8/3/2021

## 2021-08-03 NOTE — PLAN OF CARE
Problem: Adult Inpatient Plan of Care  Goal: Plan of Care Review  Outcome: Ongoing, Progressing  Flowsheets (Taken 8/3/2021 0207)  Progress: improving  Outcome Summary: no acute events. vss. pain control. continue to monitor  Goal: Patient-Specific Goal (Individualized)  Outcome: Ongoing, Progressing  Goal: Absence of Hospital-Acquired Illness or Injury  Outcome: Ongoing, Progressing  Intervention: Identify and Manage Fall Risk  Recent Flowsheet Documentation  Taken 8/2/2021 2225 by Jef Woods RN  Safety Promotion/Fall Prevention: safety round/check completed  Taken 8/2/2021 2023 by Jef Woods RN  Safety Promotion/Fall Prevention: safety round/check completed  Taken 8/2/2021 1953 by Jef Woods RN  Safety Promotion/Fall Prevention: safety round/check completed  Intervention: Prevent and Manage VTE (venous thromboembolism) Risk  Recent Flowsheet Documentation  Taken 8/2/2021 1953 by Jef Woods RN  VTE Prevention/Management: (heparin) other (see comments)  Goal: Optimal Comfort and Wellbeing  Outcome: Ongoing, Progressing  Intervention: Provide Person-Centered Care  Recent Flowsheet Documentation  Taken 8/2/2021 1953 by Jef Woods RN  Trust Relationship/Rapport: care explained  Goal: Readiness for Transition of Care  Outcome: Ongoing, Progressing     Problem: Skin or Soft Tissue Infection  Goal: Infection Symptom Resolution  Outcome: Ongoing, Progressing  Intervention: Minimize and Manage Infection Progression  Recent Flowsheet Documentation  Taken 8/2/2021 1953 by Jef Woods RN  Isolation Precautions: (hx CRE) contact precautions maintained     Problem: COPD Comorbidity  Goal: Maintenance of COPD Symptom Control  Outcome: Ongoing, Progressing     Problem: Hypertension Comorbidity  Goal: Blood Pressure in Desired Range  Outcome: Ongoing, Progressing     Problem: Fall Injury Risk  Goal: Absence of Fall and Fall-Related Injury  Outcome: Ongoing, Progressing  Intervention:  Promote Injury-Free Environment  Recent Flowsheet Documentation  Taken 8/2/2021 2225 by Jef Woods, RN  Safety Promotion/Fall Prevention: safety round/check completed  Taken 8/2/2021 2023 by Jef Woods, RN  Safety Promotion/Fall Prevention: safety round/check completed  Taken 8/2/2021 1953 by Jef Woods, RN  Safety Promotion/Fall Prevention: safety round/check completed   Goal Outcome Evaluation:  Plan of Care Reviewed With: patient        Progress: improving  Outcome Summary: no acute events. vss. pain control. continue to monitor

## 2021-08-03 NOTE — PLAN OF CARE
Goal Outcome Evaluation:  Plan of Care Reviewed With: patient           Outcome Summary: initial OT evaluation complete. pt was pleasant and cooperative throughout, some encouragement required for participation. pt is having 7/10 LLE pain, and states that she is unable to put weight on it secondary to this, though she puts weight on it for mobility and transfers. pt was min A for bed mobility. CGA for transfers and functional mobility with hand held assistance. dependent for toileting tasks. BUE ROM WFL grossly. BUE strength and bilateral  strength are grossly 4- to 4/5. recommend further skilled OT services to address functional mobility and ADL Deficits, as well as balance, strength, and endurance. recommend home with assistance and home health OT at discharge. goals established.

## 2021-08-03 NOTE — PROGRESS NOTES
"[facility]  [address] [place] [address]. [id number]  T - [phone]  F - [phone]     Progress Note          PATIENT  DEMOGRAPHICS   PATIENT NAME: Yamileth Slater                      PHYSICIAN: Ace Lauren MD  : 1960  MRN: [id number]   LOS: 0 days    Patient Care Team:  Rianna Macias MD as PCP - General (Family Medicine)  Subjective   SUBJECTIVE   Still has marked left leg pain         Objective   OBJECTIVE   Vital Signs  Temp:  [97.8 °F (36.6 °C)-98.4 °F (36.9 °C)] 98 °F (36.7 °C)  Heart Rate:  [61-65] 64  Resp:  [18-20] 20  BP: (154-166)/(69-82) 166/70    Flowsheet Rows      First Filed Value   Admission Height  167.6 cm (66\") Documented at 2021   Admission Weight  127 kg (280 lb) Documented at 2021           I/O last 3 completed shifts:  In: 1190 [P.O.:840; IV Piggyback:350]  Out: 3300 [Urine:3300]    PHYSICAL EXAM    Physical Exam  Vitals reviewed.   Constitutional:       Appearance: Normal appearance.   HENT:      Nose: Nose normal.   Cardiovascular:      Rate and Rhythm: Normal rate and regular rhythm.      Pulses: Normal pulses.      Heart sounds: Normal heart sounds. No murmur heard.     Pulmonary:      Effort: Pulmonary effort is normal.      Breath sounds: No wheezing or rales.   Abdominal:      General: Abdomen is flat. There is no distension.      Palpations: Abdomen is soft.      Tenderness: There is no abdominal tenderness.   Musculoskeletal:         General: Swelling and tenderness present.   Skin:     Coloration: Skin is not jaundiced.      Findings: No erythema.   Neurological:      General: No focal deficit present.      Mental Status: She is alert. She is disoriented.         RESULTS   Results Review:    Results from last 7 days   Lab Units 21  0523 21  0518 21  0544 21  0541 21   SODIUM mmol/L 134* 129* 133*   < > 128*   POTASSIUM mmol/L 4.8 4.8 4.3   < > 3.7   CHLORIDE mmol/L 103 100 103 "   < > 94*   CO2 mmol/L 21.0* 20.0* 20.0*   < > 25.0   BUN mg/dL 41* 42* 39*   < > 41*   CREATININE mg/dL 2.58* 2.73* 2.26*   < > 2.08*   CALCIUM mg/dL 8.7 8.5* 8.1*   < > 8.7   BILIRUBIN mg/dL  --   --   --   --  0.2   ALK PHOS U/L  --   --   --   --  206*   ALT (SGPT) U/L  --   --   --   --  12   AST (SGOT) U/L  --   --   --   --  19   GLUCOSE mg/dL 228* 268* 171*   < > 305*    < > = values in this interval not displayed.       Estimated Creatinine Clearance: 30.8 mL/min (A) (by C-G formula based on SCr of 2.58 mg/dL (H)).    Results from last 7 days   Lab Units 08/01/21  0544 07/31/21 2228 07/31/21 0541   MAGNESIUM mg/dL 2.3 1.8 1.4*             Results from last 7 days   Lab Units 08/03/21  0523 08/02/21  0518 08/01/21  0544 07/31/21  0541 07/30/21 2011   WBC 10*3/mm3 9.71 9.26 8.90 10.52 9.11   HEMOGLOBIN g/dL 7.6* 7.4* 7.2* 7.8* 8.2*   PLATELETS 10*3/mm3 260 242 280 312 340               Imaging Results (Last 24 Hours)     ** No results found for the last 24 hours. **           MEDICATIONS    !Vancomycin Level Draw Needed, , Does not apply, Once  aspirin, 81 mg, Oral, Daily  atorvastatin, 40 mg, Oral, Daily  cefepime, 2 g, Intravenous, Q24H  clopidogrel, 75 mg, Oral, Daily  docosanol, , Topical, 5x Daily  DULoxetine, 20 mg, Oral, Daily  gabapentin, 800 mg, Oral, TID  heparin (porcine), 5,000 Units, Subcutaneous, Q12H  insulin detemir, 50 Units, Subcutaneous, Q12H  isosorbide mononitrate, 30 mg, Oral, Daily  metoprolol tartrate, 100 mg, Oral, Q12H  oxybutynin XL, 5 mg, Oral, Daily  sodium bicarbonate, 650 mg, Oral, TID  sodium chloride, 10 mL, Intravenous, Q12H  vancomycin, 1,250 mg, Intravenous, Q24H      Pharmacy to Dose Cefepime,   Pharmacy to dose vancomycin,   sodium chloride, 50 mL/hr, Last Rate: 50 mL/hr (08/02/21 7827)        Assessment/Plan   ASSESSMENT / PLAN      Cellulitis of left leg       1.  ERIKA on CKD 4- Baseline creatinine around 2.0, up to 2.7 now and will continue gentle IV hydration.   Observe response to vancomycin.  Zosyn has been discontinued and changed to cefepime. Will hold diuretics for now until renal function stabliizes. Cr better and keep low rate of ivf     2.  Hyponatremia- mild     3.  Left lower extremity cellulitis- on antibiotics per primary team, failed previous antibiotic course     4.  History of CAD     5.  History of COPD with recent copd exacerbation / pna at outside hospital      6.  Anemia- hemoglobin stable at 7.6 check anemia profile                This document has been electronically signed by Ace Lauren MD on August 3, 2021 11:54 CDT

## 2021-08-03 NOTE — PROGRESS NOTES
"    [facility] Medicine Services  INPATIENT PROGRESS NOTE    Length of Stay: 0  Date of Admission: 7/30/2021  Primary Care Physician: Rianna Macias MD    Subjective   Chief Complaint: Cellulitis, leg pain  HPI:  Continues with leg pain. Says this is \"terrible.\" Notes that IV dilaudid added yesterday has helped but does not last long enough. Per nursing, patient has slept a lot over past 24 hours since receiving this.     She denies chest pain, shortness of breath, cough, congestion.    Denies fever, chills, blood in stool or urine.    Review of Systems     All pertinent negatives and positives are as above. All other systems have been reviewed and are negative unless otherwise stated.     Objective    Temp:  [97.8 °F (36.6 °C)-98.4 °F (36.9 °C)] 98 °F (36.7 °C)  Heart Rate:  [61-65] 64  Resp:  [18-20] 20  BP: (154-166)/(69-82) 166/70    Physical Exam  Vitals and nursing note reviewed.   Constitutional:       Appearance: Normal appearance.   HENT:      Head: Normocephalic.   Cardiovascular:      Rate and Rhythm: Normal rate and regular rhythm.      Pulses: Normal pulses.   Pulmonary:      Effort: Pulmonary effort is normal.      Breath sounds: Normal breath sounds.   Abdominal:      General: Bowel sounds are normal.      Palpations: Abdomen is soft.   Musculoskeletal:      Right lower leg: Edema present.      Left lower leg: Edema present.   Skin:     General: Skin is warm and dry.      Findings: Erythema (left lateral and posterior leg-slightly improved today) present.   Neurological:      General: No focal deficit present.      Mental Status: She is alert and oriented to person, place, and time.   Psychiatric:         Mood and Affect: Mood normal.         Behavior: Behavior normal.             Results Review:  I have reviewed the labs, radiology results, and diagnostic studies.    Laboratory Data:   Results from last 7 days   Lab Units 08/03/21  0523 08/02/21  0518 " 08/01/21 0544 07/31/21 0541 07/30/21 2011   SODIUM mmol/L 134* 129* 133*   < > 128*   POTASSIUM mmol/L 4.8 4.8 4.3   < > 3.7   CHLORIDE mmol/L 103 100 103   < > 94*   CO2 mmol/L 21.0* 20.0* 20.0*   < > 25.0   BUN mg/dL 41* 42* 39*   < > 41*   CREATININE mg/dL 2.58* 2.73* 2.26*   < > 2.08*   GLUCOSE mg/dL 228* 268* 171*   < > 305*   CALCIUM mg/dL 8.7 8.5* 8.1*   < > 8.7   BILIRUBIN mg/dL  --   --   --   --  0.2   ALK PHOS U/L  --   --   --   --  206*   ALT (SGPT) U/L  --   --   --   --  12   AST (SGOT) U/L  --   --   --   --  19   ANION GAP mmol/L 10.0 9.0 10.0   < > 9.0    < > = values in this interval not displayed.     Estimated Creatinine Clearance: 30.8 mL/min (A) (by C-G formula based on SCr of 2.58 mg/dL (H)).  Results from last 7 days   Lab Units 08/01/21 0544 07/31/21 2228 07/31/21 0541   MAGNESIUM mg/dL 2.3 1.8 1.4*         Results from last 7 days   Lab Units 08/03/21 0523 08/02/21 0518 08/01/21 0544 07/31/21 0541 07/30/21 2011   WBC 10*3/mm3 9.71 9.26 8.90 10.52 9.11   HEMOGLOBIN g/dL 7.6* 7.4* 7.2* 7.8* 8.2*   HEMATOCRIT % 24.5* 22.9* 23.1* 24.7* 26.3*   PLATELETS 10*3/mm3 260 242 280 312 340           Culture Data:   No results found for: BLOODCX  No results found for: URINECX  No results found for: RESPCX  No results found for: WOUNDCX  No results found for: STOOLCX  No components found for: BODYFLD    Radiology Data:   Imaging Results (Last 24 Hours)     ** No results found for the last 24 hours. **          I have reviewed the patient's current medications.     Assessment/Plan         Cellulitis of left leg-With noted failure of previous of IV antibiotic course. LE duplex study negative for DVT. Will continue broad spectrum coverage with vancomycin (d#4) and cefepime (d#2). Note patient received 2-3 days of IV zosyn prior to cefepime but dc'd on 8/2 due to ERIKA.Note CRP uptrending today but exam improved.   CKD IV-Creatinine at baseline by review of chart. Creatinine improved following  antibiotic change yesterday.  Pharmacy to continue dosing. Follow up further nephrology recommendations.     CAD-Currently stable without chest pain or reported equivalent.Noted abnormal troponin with stable EKG. Suspect this is related to renal insufficiency. Continue cardiac monitoring.      COPD-Stable without exacerbation.     Anemia-acute on chronic secondary to CKD. No reported blood loss. Check stool for occult blood. Discussed with patient, will avoid transfusion as possible. H&H stable today. Trend labs-if further decline, will need to transfuse.      Abnormal troponin-as above.     Electrolyte Imbalance-resolved.   Type 2 Diabetes Mellitus-Suboptimal control. Basal insulin dosing increased for total 90 units to 100 units daily. Continue SSI/accuchecks.     Discharge Planning: I expect patient to be discharged in 2-3 days.     Genevieve Raymundo MD     I confirmed that the patient's Advance Care Plan is present, code status is documented, or surrogate decision maker is listed in the patient's medical record.

## 2021-08-03 NOTE — PLAN OF CARE
Goal Outcome Evaluation:      Pt resting ,new pain meds ordered.pt pleased..will cont to monitor

## 2021-08-04 ENCOUNTER — APPOINTMENT (OUTPATIENT)
Dept: ULTRASOUND IMAGING | Facility: HOSPITAL | Age: 61
End: 2021-08-04

## 2021-08-04 LAB
ANION GAP SERPL CALCULATED.3IONS-SCNC: 10 MMOL/L (ref 5–15)
BACTERIA SPEC AEROBE CULT: NORMAL
BACTERIA SPEC AEROBE CULT: NORMAL
BASOPHILS # BLD AUTO: 0.06 10*3/MM3 (ref 0–0.2)
BASOPHILS NFR BLD AUTO: 0.7 % (ref 0–1.5)
BUN SERPL-MCNC: 36 MG/DL (ref 8–23)
BUN/CREAT SERPL: 16.8 (ref 7–25)
CALCIUM SPEC-SCNC: 8.6 MG/DL (ref 8.6–10.5)
CHLORIDE SERPL-SCNC: 100 MMOL/L (ref 98–107)
CO2 SERPL-SCNC: 20 MMOL/L (ref 22–29)
CREAT SERPL-MCNC: 2.14 MG/DL (ref 0.57–1)
CRP SERPL-MCNC: 11.37 MG/DL (ref 0–0.5)
DEPRECATED RDW RBC AUTO: 47.5 FL (ref 37–54)
EOSINOPHIL # BLD AUTO: 0.33 10*3/MM3 (ref 0–0.4)
EOSINOPHIL NFR BLD AUTO: 3.8 % (ref 0.3–6.2)
ERYTHROCYTE [DISTWIDTH] IN BLOOD BY AUTOMATED COUNT: 14.8 % (ref 12.3–15.4)
GFR SERPL CREATININE-BSD FRML MDRD: 23 ML/MIN/1.73
GLUCOSE BLDC GLUCOMTR-MCNC: 219 MG/DL (ref 70–130)
GLUCOSE BLDC GLUCOMTR-MCNC: 221 MG/DL (ref 70–130)
GLUCOSE BLDC GLUCOMTR-MCNC: 233 MG/DL (ref 70–130)
GLUCOSE BLDC GLUCOMTR-MCNC: 249 MG/DL (ref 70–130)
GLUCOSE BLDC GLUCOMTR-MCNC: 394 MG/DL (ref 70–130)
GLUCOSE BLDC GLUCOMTR-MCNC: 416 MG/DL (ref 70–130)
GLUCOSE SERPL-MCNC: 226 MG/DL (ref 65–99)
HCT VFR BLD AUTO: 22.8 % (ref 34–46.6)
HGB BLD-MCNC: 7.2 G/DL (ref 12–15.9)
IMM GRANULOCYTES # BLD AUTO: 0.09 10*3/MM3 (ref 0–0.05)
IMM GRANULOCYTES NFR BLD AUTO: 1 % (ref 0–0.5)
LYMPHOCYTES # BLD AUTO: 2.3 10*3/MM3 (ref 0.7–3.1)
LYMPHOCYTES NFR BLD AUTO: 26.2 % (ref 19.6–45.3)
MCH RBC QN AUTO: 27.7 PG (ref 26.6–33)
MCHC RBC AUTO-ENTMCNC: 31.6 G/DL (ref 31.5–35.7)
MCV RBC AUTO: 87.7 FL (ref 79–97)
MONOCYTES # BLD AUTO: 0.57 10*3/MM3 (ref 0.1–0.9)
MONOCYTES NFR BLD AUTO: 6.5 % (ref 5–12)
NEUTROPHILS NFR BLD AUTO: 5.43 10*3/MM3 (ref 1.7–7)
NEUTROPHILS NFR BLD AUTO: 61.8 % (ref 42.7–76)
NRBC BLD AUTO-RTO: 0 /100 WBC (ref 0–0.2)
PLATELET # BLD AUTO: 267 10*3/MM3 (ref 140–450)
PMV BLD AUTO: 8.8 FL (ref 6–12)
POTASSIUM SERPL-SCNC: 4.6 MMOL/L (ref 3.5–5.2)
RBC # BLD AUTO: 2.6 10*6/MM3 (ref 3.77–5.28)
SODIUM SERPL-SCNC: 130 MMOL/L (ref 136–145)
VANCOMYCIN TROUGH SERPL-MCNC: 24.9 MCG/ML (ref 5–20)
WBC # BLD AUTO: 8.78 10*3/MM3 (ref 3.4–10.8)

## 2021-08-04 PROCEDURE — 82962 GLUCOSE BLOOD TEST: CPT

## 2021-08-04 PROCEDURE — 25010000002 HYDROMORPHONE 1 MG/ML SOLUTION: Performed by: INTERNAL MEDICINE

## 2021-08-04 PROCEDURE — 80202 ASSAY OF VANCOMYCIN: CPT | Performed by: FAMILY MEDICINE

## 2021-08-04 PROCEDURE — 25010000002 HEPARIN (PORCINE) PER 1000 UNITS: Performed by: INTERNAL MEDICINE

## 2021-08-04 PROCEDURE — 25010000002 VANCOMYCIN 5 G RECONSTITUTED SOLUTION: Performed by: FAMILY MEDICINE

## 2021-08-04 PROCEDURE — 97535 SELF CARE MNGMENT TRAINING: CPT

## 2021-08-04 PROCEDURE — 86140 C-REACTIVE PROTEIN: CPT | Performed by: FAMILY MEDICINE

## 2021-08-04 PROCEDURE — 97162 PT EVAL MOD COMPLEX 30 MIN: CPT

## 2021-08-04 PROCEDURE — 76882 US LMTD JT/FCL EVL NVASC XTR: CPT

## 2021-08-04 PROCEDURE — 85025 COMPLETE CBC W/AUTO DIFF WBC: CPT | Performed by: FAMILY MEDICINE

## 2021-08-04 PROCEDURE — 25010000002 CEFEPIME PER 500 MG: Performed by: FAMILY MEDICINE

## 2021-08-04 PROCEDURE — 97110 THERAPEUTIC EXERCISES: CPT

## 2021-08-04 PROCEDURE — 80048 BASIC METABOLIC PNL TOTAL CA: CPT | Performed by: FAMILY MEDICINE

## 2021-08-04 PROCEDURE — 63710000001 INSULIN ASPART PER 5 UNITS: Performed by: INTERNAL MEDICINE

## 2021-08-04 PROCEDURE — 63710000001 INSULIN DETEMIR PER 5 UNITS: Performed by: FAMILY MEDICINE

## 2021-08-04 PROCEDURE — 25010000002 NA FERRIC GLUC CPLX PER 12.5 MG: Performed by: INTERNAL MEDICINE

## 2021-08-04 RX ORDER — FUROSEMIDE 20 MG/1
TABLET ORAL
Qty: 30 TABLET | Refills: 0 | OUTPATIENT
Start: 2021-08-04

## 2021-08-04 RX ORDER — SODIUM BICARBONATE 650 MG/1
1300 TABLET ORAL 3 TIMES DAILY
Status: DISCONTINUED | OUTPATIENT
Start: 2021-08-04 | End: 2021-08-09

## 2021-08-04 RX ORDER — BUMETANIDE 0.25 MG/ML
2 INJECTION INTRAMUSCULAR; INTRAVENOUS ONCE
Status: COMPLETED | OUTPATIENT
Start: 2021-08-04 | End: 2021-08-04

## 2021-08-04 RX ORDER — LIDOCAINE 50 MG/G
1 PATCH TOPICAL SEE ADMIN INSTRUCTIONS
Qty: 30 PATCH | Refills: 3 | OUTPATIENT
Start: 2021-08-04

## 2021-08-04 RX ADMIN — DOCOSANOL: 100 CREAM TOPICAL at 15:01

## 2021-08-04 RX ADMIN — ISOSORBIDE MONONITRATE 30 MG: 30 TABLET, EXTENDED RELEASE ORAL at 08:59

## 2021-08-04 RX ADMIN — HEPARIN SODIUM 5000 UNITS: 5000 INJECTION INTRAVENOUS; SUBCUTANEOUS at 22:00

## 2021-08-04 RX ADMIN — INSULIN DETEMIR 50 UNITS: 100 INJECTION, SOLUTION SUBCUTANEOUS at 22:01

## 2021-08-04 RX ADMIN — SODIUM BICARBONATE 650 MG: 650 TABLET ORAL at 09:00

## 2021-08-04 RX ADMIN — METOPROLOL TARTRATE 100 MG: 50 TABLET, FILM COATED ORAL at 22:00

## 2021-08-04 RX ADMIN — SODIUM BICARBONATE 1300 MG: 650 TABLET ORAL at 22:00

## 2021-08-04 RX ADMIN — CLOPIDOGREL BISULFATE 75 MG: 75 TABLET ORAL at 08:59

## 2021-08-04 RX ADMIN — DOCOSANOL: 100 CREAM TOPICAL at 12:30

## 2021-08-04 RX ADMIN — DULOXETINE HYDROCHLORIDE 20 MG: 20 CAPSULE, DELAYED RELEASE ORAL at 08:59

## 2021-08-04 RX ADMIN — HYDROMORPHONE HYDROCHLORIDE 0.5 MG: 1 INJECTION, SOLUTION INTRAMUSCULAR; INTRAVENOUS; SUBCUTANEOUS at 22:10

## 2021-08-04 RX ADMIN — VANCOMYCIN HYDROCHLORIDE 750 MG: 5 INJECTION, POWDER, LYOPHILIZED, FOR SOLUTION INTRAVENOUS at 21:36

## 2021-08-04 RX ADMIN — CEFEPIME HYDROCHLORIDE 2 G: 2 INJECTION, POWDER, FOR SOLUTION INTRAVENOUS at 21:36

## 2021-08-04 RX ADMIN — INSULIN ASPART 4 UNITS: 100 INJECTION, SOLUTION INTRAVENOUS; SUBCUTANEOUS at 09:00

## 2021-08-04 RX ADMIN — INSULIN DETEMIR 50 UNITS: 100 INJECTION, SOLUTION SUBCUTANEOUS at 09:00

## 2021-08-04 RX ADMIN — SODIUM CHLORIDE, PRESERVATIVE FREE 10 ML: 5 INJECTION INTRAVENOUS at 22:01

## 2021-08-04 RX ADMIN — GABAPENTIN 800 MG: 400 CAPSULE ORAL at 16:48

## 2021-08-04 RX ADMIN — HEPARIN SODIUM 5000 UNITS: 5000 INJECTION INTRAVENOUS; SUBCUTANEOUS at 08:59

## 2021-08-04 RX ADMIN — SODIUM BICARBONATE 1300 MG: 650 TABLET ORAL at 16:48

## 2021-08-04 RX ADMIN — INSULIN ASPART 4 UNITS: 100 INJECTION, SOLUTION INTRAVENOUS; SUBCUTANEOUS at 16:48

## 2021-08-04 RX ADMIN — SODIUM CHLORIDE 50 ML/HR: 9 INJECTION, SOLUTION INTRAVENOUS at 06:20

## 2021-08-04 RX ADMIN — HYDROMORPHONE HYDROCHLORIDE 0.5 MG: 1 INJECTION, SOLUTION INTRAMUSCULAR; INTRAVENOUS; SUBCUTANEOUS at 11:05

## 2021-08-04 RX ADMIN — GABAPENTIN 800 MG: 400 CAPSULE ORAL at 22:00

## 2021-08-04 RX ADMIN — GABAPENTIN 800 MG: 400 CAPSULE ORAL at 09:03

## 2021-08-04 RX ADMIN — BUMETANIDE 2 MG: 0.25 INJECTION INTRAMUSCULAR; INTRAVENOUS at 12:53

## 2021-08-04 RX ADMIN — INSULIN ASPART 4 UNITS: 100 INJECTION, SOLUTION INTRAVENOUS; SUBCUTANEOUS at 11:32

## 2021-08-04 RX ADMIN — METOPROLOL TARTRATE 100 MG: 50 TABLET, FILM COATED ORAL at 09:00

## 2021-08-04 RX ADMIN — HYDROMORPHONE HYDROCHLORIDE 0.5 MG: 1 INJECTION, SOLUTION INTRAMUSCULAR; INTRAVENOUS; SUBCUTANEOUS at 06:20

## 2021-08-04 RX ADMIN — SODIUM CHLORIDE 125 MG: 9 INJECTION, SOLUTION INTRAVENOUS at 12:52

## 2021-08-04 RX ADMIN — ASPIRIN 81 MG: 81 TABLET, FILM COATED ORAL at 09:00

## 2021-08-04 RX ADMIN — ATORVASTATIN CALCIUM 40 MG: 40 TABLET, FILM COATED ORAL at 08:59

## 2021-08-04 RX ADMIN — DOCOSANOL: 100 CREAM TOPICAL at 06:21

## 2021-08-04 RX ADMIN — OXYBUTYNIN CHLORIDE 5 MG: 5 TABLET, EXTENDED RELEASE ORAL at 09:00

## 2021-08-04 RX ADMIN — DOCOSANOL: 100 CREAM TOPICAL at 17:01

## 2021-08-04 RX ADMIN — DOCOSANOL: 100 CREAM TOPICAL at 22:02

## 2021-08-04 NOTE — PROGRESS NOTES
[facility] Medicine Services  INPATIENT PROGRESS NOTE    Length of Stay: 1  Date of Admission: 7/30/2021  Primary Care Physician: Rianna Macias MD    Subjective   Chief Complaint: Lower extremity cellulitis  HPI: Patient seen and examined, she reports that she has some left lower extremity pain still.  Per nursing report her cellulitis is advanced up her leg somewhat today.    Review of Systems   Constitutional: Negative for chills and fever.   HENT: Negative for congestion.    Respiratory: Negative for shortness of breath.    Cardiovascular: Negative for chest pain.   Gastrointestinal: Negative for abdominal pain, constipation, diarrhea, nausea and vomiting.   Genitourinary: Negative.    Musculoskeletal: Positive for arthralgias and myalgias.   Neurological: Negative.    Psychiatric/Behavioral: Negative.    All other systems reviewed and are negative.     All pertinent negatives and positives are as above. All other systems have been reviewed and are negative unless otherwise stated.     Objective    Temp:  [97.2 °F (36.2 °C)-98.3 °F (36.8 °C)] 97.6 °F (36.4 °C)  Heart Rate:  [60-69] 65  Resp:  [18-24] 21  BP: (148-178)/(67-76) 158/71    Physical Exam  Constitutional:       General: She is not in acute distress.     Appearance: She is not toxic-appearing.   HENT:      Head: Normocephalic and atraumatic.      Right Ear: External ear normal.      Left Ear: External ear normal.      Nose: Nose normal.      Mouth/Throat:      Mouth: Mucous membranes are moist.      Pharynx: Oropharynx is clear.   Eyes:      Conjunctiva/sclera: Conjunctivae normal.   Cardiovascular:      Rate and Rhythm: Normal rate and regular rhythm.      Pulses: Normal pulses.      Heart sounds: Normal heart sounds.   Pulmonary:      Effort: Pulmonary effort is normal. No respiratory distress.      Breath sounds: Normal breath sounds.   Abdominal:      General: Bowel sounds are normal.      Palpations:  Abdomen is soft.      Tenderness: There is no abdominal tenderness.   Musculoskeletal:         General: Swelling present.      Cervical back: Neck supple.   Skin:     General: Skin is warm.      Capillary Refill: Capillary refill takes less than 2 seconds.      Findings: Erythema present.      Comments: LLE induration, noted with erythema consistent with cellulitis.    Neurological:      Mental Status: She is alert.   Psychiatric:         Mood and Affect: Mood normal.         Behavior: Behavior normal.         Results Review:  I have reviewed the labs, radiology results, and diagnostic studies.    Laboratory Data:   Results from last 7 days   Lab Units 08/04/21 0526 08/03/21 0523 08/02/21 0518 07/31/21  0541 07/30/21 2011   SODIUM mmol/L 130* 134* 129*   < > 128*   POTASSIUM mmol/L 4.6 4.8 4.8   < > 3.7   CHLORIDE mmol/L 100 103 100   < > 94*   CO2 mmol/L 20.0* 21.0* 20.0*   < > 25.0   BUN mg/dL 36* 41* 42*   < > 41*   CREATININE mg/dL 2.14* 2.58* 2.73*   < > 2.08*   GLUCOSE mg/dL 226* 228* 268*   < > 305*   CALCIUM mg/dL 8.6 8.7 8.5*   < > 8.7   BILIRUBIN mg/dL  --   --   --   --  0.2   ALK PHOS U/L  --   --   --   --  206*   ALT (SGPT) U/L  --   --   --   --  12   AST (SGOT) U/L  --   --   --   --  19   ANION GAP mmol/L 10.0 10.0 9.0   < > 9.0    < > = values in this interval not displayed.     Estimated Creatinine Clearance: 39.1 mL/min (A) (by C-G formula based on SCr of 2.14 mg/dL (H)).  Results from last 7 days   Lab Units 08/01/21 0544 07/31/21 2228 07/31/21  0541   MAGNESIUM mg/dL 2.3 1.8 1.4*         Results from last 7 days   Lab Units 08/04/21  0526 08/03/21  0523 08/02/21 0518 08/01/21 0544 07/31/21  0541   WBC 10*3/mm3 8.78 9.71 9.26 8.90 10.52   HEMOGLOBIN g/dL 7.2* 7.6* 7.4* 7.2* 7.8*   HEMATOCRIT % 22.8* 24.5* 22.9* 23.1* 24.7*   PLATELETS 10*3/mm3 267 260 242 280 312           Culture Data:   No results found for: BLOODCX  No results found for: URINECX  No results found for: RESPCX  No  results found for: WOUNDCX  No results found for: STOOLCX  No components found for: BODYFLD    Radiology Data:   Imaging Results (Last 24 Hours)     Procedure Component Value Units Date/Time    US Nonvascular Extremity Limited [[id number]] Resulted: 08/04/21 1106     Updated: 08/04/21 1106          I have reviewed the patient's current medications.     Assessment/Plan     Active Problems:    Cellulitis of left leg  ERIKA on CKD 4  CAD  COPD  Chronic anemia  Type 2 diabetes mellitus    Plan:  -Continue vancomycin and cefepime for now.  She previously been on Zosyn but this was stopped due to ERIKA  -Nephrology's been consulted given her renal disease and acute on chronic kidney disease.  Appreciate their help.  -COPD appears stable this time, continue current meds and interventions  -Anemia slightly worse but likely related to renal disease, continue to monitor for any active signs of bleeding  -Her cellulitis does appear to have spread somewhat today so we will get an ultrasound on her lower extremity and evaluate this further for any signs of abscess.  May need to revise antibiotics depending on how she responds going forward.  Depending on results may need surgical consultation.   -Continue current pain medications  -Continue current insulin dosing and glucose checks  -DVT prophylaxis with heparin  -CODE STATUS: Full      I confirmed that the patient's Advance Care Plan is present, code status is documented, or surrogate decision maker is listed in the patient's medical record.     Discharge Planning: In process.    Esau Ferguson MD

## 2021-08-04 NOTE — PROGRESS NOTES
"Pharmacokinetics by Pharmacy - Vancomycin    Yamileth Slater is a 61 y.o. female receiving vancomycin 1250mg IV Q24H day 5 for skin and soft tissue infection  Patient is also receiving cefepime    Objective:  [Ht: 167.6 cm (66\"); Wt: (!) 138 kg (303 lb 6.4 oz)]     WBC   Date Value Ref Range Status   08/04/2021 8.78 3.40 - 10.80 10*3/mm3 Final   08/03/2021 9.71 3.40 - 10.80 10*3/mm3 Final   08/02/2021 9.26 3.40 - 10.80 10*3/mm3 Final      C-Reactive Protein   Date Value Ref Range Status   08/04/2021 11.37 (H) 0.00 - 0.50 mg/dL Final   08/03/2021 13.29 (H) 0.00 - 0.50 mg/dL Final   08/02/2021 11.40 (H) 0.00 - 0.50 mg/dL Final     Lactate   Date Value Ref Range Status   07/30/2021 0.8 0.5 - 2.0 mmol/L Final   09/10/2018 1.3 0.5 - 2.0 mmol/L Final   09/10/2018 2.1 (C) 0.5 - 2.0 mmol/L Final      Temp Readings from Last 1 Encounters:   08/04/21 97.6 °F (36.4 °C) (Oral)     Estimated Creatinine Clearance: 39.1 mL/min (A) (by C-G formula based on SCr of 2.14 mg/dL (H)).   Creatinine   Date Value Ref Range Status   08/04/2021 2.14 (H) 0.57 - 1.00 mg/dL Final   08/03/2021 2.58 (H) 0.57 - 1.00 mg/dL Final   08/02/2021 2.73 (H) 0.57 - 1.00 mg/dL Final       Vancomycin Trough   Date Value Ref Range Status   08/01/2021 22.10 (H) 5.00 - 20.00 mcg/mL Final   03/20/2017 13.25 10.00 - 15.00 mcg/mL Final     Vancomycin Random   Date Value Ref Range Status   04/02/2021 15.80 5.00 - 40.00 mcg/mL Final       Culture Results:  Microbiology Results (last 10 days)       Procedure Component Value - Date/Time    Blood Culture - Blood, Arm, Left [[id number]] Collected: 07/30/21 2011    Lab Status: Preliminary result Specimen: Blood from Arm, Left Updated: 08/03/21 2045     Blood Culture No growth at 4 days    Narrative:      Plated and returned to blood culture incubator at 2200    Blood Culture - Blood, Wrist, Right [[id number]] Collected: 07/30/21 2008    Lab Status: Preliminary result Specimen: Blood from Wrist, Right Updated: 08/03/21 " 2045     Blood Culture No growth at 4 days    COVID-19 and FLU A/B PCR - Swab, Nasopharynx [[id number]]  (Normal) Collected: 07/30/21 1929    Lab Status: Final result Specimen: Swab from Nasopharynx Updated: 07/30/21 2001     COVID19 Not Detected     Influenza A PCR Not Detected     Influenza B PCR Not Detected    Narrative:      Fact sheet for providers: https://www.fda.gov/media/275190/download    Fact sheet for patients: https://www.fda.gov/media/455084/download    Test performed by PCR.          No results found for: RESPCX      Assessment:  Today is last day of therapy per current consult  SCr improving still after transitioning Zosyn to Cefepime  WBC remain WNL, afebrile  No more levels planned    Plan:  1. Continue vancomycin 1250mg IV Q24H, last dose this afternoon  2.  No further levels planned  3. Pharmacy will monitor renal function and adjust dose accordingly.    ADDENDUM:  Consult extended, vancomycin trough collected and revelaed supratherapeutic level  Dose decreased to 750 mg Q24H  To achieve goal trough of 15-20  Ultrasound today showed continued edema without abscess  Will follow      Edgard Santos, PharmD   08/04/21 09:22 CDT

## 2021-08-04 NOTE — PLAN OF CARE
Goal Outcome Evaluation:  Plan of Care Reviewed With: patient        Progress: no change  Outcome Summary: vss; cellulitis spread; marked and made MD aware; dilaudid given as ordered for prn pain; will continue to monitor

## 2021-08-04 NOTE — PROGRESS NOTES
"[facility]  [address] [place] [address]. [address]  T - [phone]  F - [phone]     Progress Note          PATIENT  DEMOGRAPHICS   PATIENT NAME: Yamileth Slater                      PHYSICIAN: Ace Lauren MD  : 1960  MRN: [id number]   LOS: 1 day    Patient Care Team:  Rianna Macias MD as PCP - General (Family Medicine)  Subjective   SUBJECTIVE   Still has marked left leg pain, Hurting left knee          Objective   OBJECTIVE   Vital Signs  Temp:  [97.2 °F (36.2 °C)-98.3 °F (36.8 °C)] 97.6 °F (36.4 °C)  Heart Rate:  [60-69] 65  Resp:  [18-24] 21  BP: (148-178)/(67-76) 158/71    Flowsheet Rows      First Filed Value   Admission Height  167.6 cm (66\") Documented at 2021   Admission Weight  127 kg (280 lb) Documented at 2021           I/O last 3 completed shifts:  In:  [P.O.:840; I.V.:1000; IV Piggyback:250]  Out: 3200 [Urine:3200]    PHYSICAL EXAM    Physical Exam  Vitals reviewed.   Constitutional:       Appearance: Normal appearance.   HENT:      Nose: Nose normal.   Cardiovascular:      Rate and Rhythm: Normal rate and regular rhythm.      Pulses: Normal pulses.      Heart sounds: Normal heart sounds. No murmur heard.     Pulmonary:      Effort: Pulmonary effort is normal.      Breath sounds: No wheezing or rales.   Abdominal:      General: Abdomen is flat. There is no distension.      Palpations: Abdomen is soft.      Tenderness: There is no abdominal tenderness.   Musculoskeletal:         General: Swelling and tenderness present.   Skin:     Coloration: Skin is not jaundiced.      Findings: No erythema.   Neurological:      General: No focal deficit present.      Mental Status: She is alert. She is disoriented.         RESULTS   Results Review:    Results from last 7 days   Lab Units 21  0526 21  0523 21  0518 21  0541 21   SODIUM mmol/L 130* 134* 129*   < > 128*   POTASSIUM mmol/L 4.6 4.8 4.8   < > " 3.7   CHLORIDE mmol/L 100 103 100   < > 94*   CO2 mmol/L 20.0* 21.0* 20.0*   < > 25.0   BUN mg/dL 36* 41* 42*   < > 41*   CREATININE mg/dL 2.14* 2.58* 2.73*   < > 2.08*   CALCIUM mg/dL 8.6 8.7 8.5*   < > 8.7   BILIRUBIN mg/dL  --   --   --   --  0.2   ALK PHOS U/L  --   --   --   --  206*   ALT (SGPT) U/L  --   --   --   --  12   AST (SGOT) U/L  --   --   --   --  19   GLUCOSE mg/dL 226* 228* 268*   < > 305*    < > = values in this interval not displayed.       Estimated Creatinine Clearance: 39.1 mL/min (A) (by C-G formula based on SCr of 2.14 mg/dL (H)).    Results from last 7 days   Lab Units 08/01/21  0544 07/31/21  2228 07/31/21  0541   MAGNESIUM mg/dL 2.3 1.8 1.4*             Results from last 7 days   Lab Units 08/04/21  0526 08/03/21  0523 08/02/21  0518 08/01/21  0544 07/31/21  0541   WBC 10*3/mm3 8.78 9.71 9.26 8.90 10.52   HEMOGLOBIN g/dL 7.2* 7.6* 7.4* 7.2* 7.8*   PLATELETS 10*3/mm3 267 260 242 280 312               Imaging Results (Last 24 Hours)     Procedure Component Value Units Date/Time    US Nonvascular Extremity Limited [[id number]] Resulted: 08/04/21 1106     Updated: 08/04/21 1106           MEDICATIONS    !Vancomycin Level Draw Needed, , Does not apply, Once  aspirin, 81 mg, Oral, Daily  atorvastatin, 40 mg, Oral, Daily  cefepime, 2 g, Intravenous, Q24H  clopidogrel, 75 mg, Oral, Daily  docosanol, , Topical, 5x Daily  DULoxetine, 20 mg, Oral, Daily  gabapentin, 800 mg, Oral, TID  heparin (porcine), 5,000 Units, Subcutaneous, Q12H  insulin aspart, 0-9 Units, Subcutaneous, TID AC  insulin detemir, 50 Units, Subcutaneous, Q12H  isosorbide mononitrate, 30 mg, Oral, Daily  metoprolol tartrate, 100 mg, Oral, Q12H  oxybutynin XL, 5 mg, Oral, Daily  sodium bicarbonate, 650 mg, Oral, TID  sodium chloride, 10 mL, Intravenous, Q12H  vancomycin, 1,250 mg, Intravenous, Q24H      Pharmacy to Dose Cefepime,   Pharmacy to dose vancomycin,   Pharmacy to dose vancomycin,   sodium chloride, 50 mL/hr, Last Rate: 50  mL/hr (08/04/21 0620)        Assessment/Plan   ASSESSMENT / PLAN      Cellulitis of left leg       1.  ERIKA on CKD 4- Baseline creatinine around 2.0, up to 2.7 peak. It is improving with  gentle IV hydration.  tolerating vancomycin for now.  Zosyn has been discontinued and changed to cefepime. Will hold diuretics for now until renal function stabliizes. Dc ivf     2.  Hyponatremia- mild     3.  Left lower extremity cellulitis- on antibiotics per primary team, failed previous antibiotic course. Has u/s of leg today     4.  History of CAD     5.  History of COPD with recent copd exacerbation / pna at outside hospital      6.  Anemia- hemoglobin low - anemia is consistent with fe def / anemia of chronic disease and will give iv fe              This document has been electronically signed by Ace Lauren MD on August 4, 2021 11:37 CDT

## 2021-08-04 NOTE — THERAPY TREATMENT NOTE
Patient Name: Yamileth Slater  : 1960    MRN: [id number]                              Today's Date: 2021       Admit Date: 2021    Visit Dx:     ICD-10-CM ICD-9-CM   1. Cellulitis of left leg  L03.116 682.6   2. Anemia, unspecified type  D64.9 285.9   3. Chronic kidney disease, unspecified CKD stage  N18.9 585.9   4. Type 2 diabetes mellitus with hyperglycemia, unspecified whether long term insulin use (CMS/[facility])  E11.65 250.00   5. Impaired mobility and ADLs  Z74.09 V49.89    Z78.9      Patient Active Problem List   Diagnosis   • Type 2 diabetes mellitus with diabetic polyneuropathy, with long-term current use of insulin (CMS/[facility])   • Chronic obstructive pulmonary disease (CMS/[facility])   • Chronic midline low back pain without sciatica   • Vitamin D deficiency   • Type 2 diabetes mellitus (CMS/[facility])   • Tobacco dependence syndrome   • Surgical follow-up care   • Shoulder joint pain   • Peripheral vascular disease (CMS/[facility])   • Pain   • Neurologic disorder associated with diabetes mellitus (CMS/[facility])   • Morbid obesity with BMI of 50.0-59.9, adult (CMS/[facility])   • Mixed hyperlipidemia   • Kidney stone   • History of colon polyps   • GERD (gastroesophageal reflux disease)   • Excoriated eczema   • Essential hypertension   • Encounter for medication refill   • Dyslipidemia   • Diabetes mellitus (CMS/[facility])   • Coronary artery disease   • Chronic obstructive lung disease (CMS/[facility])   • Chronic folliculitis   • Chest pain   • Mild persistent asthma   • 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   • Uncontrolled type 2 diabetes mellitus with complication, with long-term current use of insulin (CMS/[facility])   • Anemia   • Hypothyroidism   • Carotid artery disease (CMS/[facility])   • Current smoker   • DM type 2 with diabetic peripheral neuropathy (CMS/[facility])   • [place] abuse   • PAD (peripheral artery disease) (CMS/[facility])   • S/P CABG x 3   • Intercostal pain   • Venous insufficiency   • Dyspnea on exertion   •  LAFB (left anterior fascicular block)   • Pulmonary hypertension (CMS/[facility])   • Left hip pain   • Neck pain   • Acute kidney injury superimposed on chronic kidney disease (CMS/[facility])   • MIKE and COPD overlap syndrome (CMS/[facility])   • Pneumonia due to COVID-19 virus   • CKD (chronic kidney disease) stage 4, GFR 15-29 ml/min (CMS/[facility])   • Morbid obesity (CMS/[facility])   • Type 2 diabetes mellitus with hyperglycemia, with long-term current use of insulin (CMS/[facility])   • Hyponatremia   • Hyperkalemia   • Anemia   • Cutaneous candidiasis   • Community acquired pneumonia of right middle lobe of lung   • MRSA infection   • Alkaline phosphatase elevation   • COVID-19 ruled out by laboratory testing   • Esophageal dysphagia   • Monilial esophagitis (CMS/[facility])   • NSTEMI, initial episode of care (CMS/[facility])   • CAD (coronary artery disease)   • Gastrointestinal hemorrhage   • Chronic respiratory failure with hypoxia (CMS/[facility])   • Pneumonia due to infectious organism   • Chronic hypercapnic respiratory failure (CMS/[facility])   • Cellulitis of left leg     Past Medical History:   Diagnosis Date   • Anxiety    • Carotid artery stenosis    • Chronic obstructive lung disease (CMS/[facility])    • CKD (chronic kidney disease) stage 4, GFR 15-29 ml/min (CMS/[facility])    • Colonic polyp    • Coronary arteriosclerosis    • Diabetes mellitus (CMS/[facility])    • Diabetic neuropathy (CMS/[facility])    • GERD (gastroesophageal reflux disease)    • History of transfusion    • Hypercholesterolemia    • Hypertension    • Hypomagnesemia 6/27/2021    Monitor and replace   • Morbid obesity (CMS/[facility])    • Nephrolithiasis    • Peripheral vascular disease (CMS/[facility])    • Sleep apnea    • Substance abuse (CMS/[facility])    • Vitamin D deficiency      Past Surgical History:   Procedure Laterality Date   • CARDIAC CATHETERIZATION N/A 7/14/2020   • CARDIAC CATHETERIZATION N/A 4/23/2021    Procedure: Left Heart Cath;  Surgeon: Melba Romo MD;  Location: [facility] CATH INVASIVE LOCATION;  Service:  Cardiology;  Laterality: N/A;   • CARDIAC CATHETERIZATION N/A 4/30/2021    Procedure: Percutaneous Coronary Intervention;  Surgeon: Russell Voss MD;  Location: [facility] CATH INVASIVE LOCATION;  Service: Cardiovascular;  Laterality: N/A;   • CARDIAC CATHETERIZATION N/A 4/30/2021    Procedure: Stent NIKKI coronary;  Surgeon: Russell Voss MD;  Location: [facility] CATH INVASIVE LOCATION;  Service: Cardiovascular;  Laterality: N/A;   • CAROTID STENT Left    • COLONOSCOPY     • COLONOSCOPY N/A 5/14/2021    Procedure: COLONOSCOPY;  Surgeon: Mingo Duarte MD;  Location: [facility] ENDOSCOPY;  Service: Gastroenterology;  Laterality: N/A;   • CORONARY ARTERY BYPASS GRAFT N/A 2013    CABG X 3   • CYSTOSCOPY BLADDER STONE LITHOTRIPSY Bilateral    • ENDOSCOPY N/A 4/12/2021    Procedure: ESOPHAGOGASTRODUODENOSCOPY;  Surgeon: Mingo Duarte MD;  Location: [facility] ENDOSCOPY;  Service: Gastroenterology;  Laterality: N/A;   • ENDOSCOPY N/A 5/14/2021    Procedure: ESOPHAGOGASTRODUODENOSCOPY;  Surgeon: Mingo Duarte MD;  Location: [facility] ENDOSCOPY;  Service: Gastroenterology;  Laterality: N/A;     General Information     Row Name 08/04/21 1016          OT Time and Intention    Document Type  therapy note (daily note)  -AS     Mode of Treatment  individual therapy;occupational therapy  -AS     Row Name 08/04/21 1016          Safety Issues, Functional Mobility    Impairments Affecting Function (Mobility)  balance;endurance/activity tolerance;strength;pain;shortness of breath  -AS       User Key  (r) = Recorded By, (t) = Taken By, (c) = Cosigned By    Initials Name Provider Type    AS Arti Navarrete OT Occupational Therapist          Mobility/ADL's     Row Name 08/04/21 1016          Bed Mobility    Bed Mobility  supine-sit;sit-supine  -AS     Supine-Sit [place] (Bed Mobility)  standby assist  -AS     Sit-Supine [place] (Bed Mobility)  standby assist  -AS     Assistive Device (Bed Mobility)  bed  rails;head of bed elevated  -AS     Row Name 08/04/21 1016          Transfers    Comment (Transfers)  deferred due to LLE pain  -AS     Row Name 08/04/21 1016          Activities of Daily Living    BADL Assessment/Intervention  grooming  -AS     Row Name 08/04/21 1016          Grooming Assessment/Training    [place] Level (Grooming)  oral care regimen;set up  -AS     Position (Grooming)  edge of bed sitting  -AS       User Key  (r) = Recorded By, (t) = Taken By, (c) = Cosigned By    Initials Name Provider Type    AS Arti Navarrete OT Occupational Therapist        Obj/Interventions     Row Name 08/04/21 1016          Shoulder (Therapeutic Exercise)    Shoulder (Therapeutic Exercise)  strengthening exercise;wand exercises  -AS     Shoulder Wand Exercises (Therapeutic Exercise)  flexion;horizontal aBduction/aDduction;aDduction;sitting;10 repetitions  -AS     Row Name 08/04/21 1016          Elbow/Forearm (Therapeutic Exercise)    Elbow/Forearm (Therapeutic Exercise)  strengthening exercise  -AS     Elbow/Forearm Strengthening (Therapeutic Exercise)  flexion;extension;10 repetitions;3 lb free weight  -AS     Row Name 08/04/21 1016          Therapeutic Exercise    Therapeutic Exercise  shoulder;elbow/forearm  -AS       User Key  (r) = Recorded By, (t) = Taken By, (c) = Cosigned By    Initials Name Provider Type    AS Arti Navarrete OT Occupational Therapist        Goals/Plan     Row Name 08/04/21 1016          Transfer Goal 1 (OT)    Activity/Assistive Device (Transfer Goal 1, OT)  toilet  -AS     [place] Level/Cues Needed (Transfer Goal 1, OT)  supervision required  -AS     Time Frame (Transfer Goal 1, OT)  long term goal (LTG);by discharge  -AS     Progress/Outcome (Transfer Goal 1, OT)  goal not met  -AS     Row Name 08/04/21 1016          Bathing Goal 1 (OT)    Activity/Device (Bathing Goal 1, OT)  upper body bathing AD as needed  -AS     [place] Level/Cues Needed (Bathing Goal 1, OT)  set-up  required;supervision required  -AS     Time Frame (Bathing Goal 1, OT)  long term goal (LTG);by discharge  -AS     Progress/Outcomes (Bathing Goal 1, OT)  goal not met  -AS     Row Name 08/04/21 1016          Dressing Goal 1 (OT)    Activity/Device (Dressing Goal 1, OT)  upper body dressing AD as needed  -AS     [place]/Cues Needed (Dressing Goal 1, OT)  supervision required;set-up required  -AS     Time Frame (Dressing Goal 1, OT)  long term goal (LTG);by discharge  -AS     Progress/Outcome (Dressing Goal 1, OT)  goal not met  -AS     Row Name 08/04/21 1016          Toileting Goal 1 (OT)    Activity/Device (Toileting Goal 1, OT)  toileting skills, all  -AS     [place] Level/Cues Needed (Toileting Goal 1, OT)  set-up required;supervision required  -AS     Time Frame (Toileting Goal 1, OT)  long term goal (LTG);by discharge  -AS     Progress/Outcome (Toileting Goal 1, OT)  goal not met  -AS       User Key  (r) = Recorded By, (t) = Taken By, (c) = Cosigned By    Initials Name Provider Type    AS Arti Navarrete, OT Occupational Therapist        Clinical Impression     Row Name 08/04/21 1016          Pain Assessment    Additional Documentation  Pain Scale: Numbers Pre/Post-Treatment (Group)  -AS     Row Name 08/04/21 1016          Pain Scale: Numbers Pre/Post-Treatment    Pretreatment Pain Rating  7/10  -AS     Posttreatment Pain Rating  7/10  -AS     Pain Location - Side  Left  -AS     Pain Location - Orientation  lower  -AS     Pain Location  extremity  -AS     Pain Intervention(s)  Ambulation/increased activity;Repositioned;Medication (See MAR)  -AS     Row Name 08/04/21 1016          Plan of Care Review    Plan of Care Reviewed With  patient  -AS     Row Name 08/04/21 1034          Vital Signs    Pretreatment Heart Rate (beats/min)  67  -AS     Pre SpO2 (%)  96  -AS     O2 Delivery Pre Treatment  nasal cannula  -AS     Pre Patient Position  Sitting  -AS     Row Name 08/04/21 1016          Positioning and  Restraints    Pre-Treatment Position  in bed  -AS     Post Treatment Position  bed  -AS     In Bed  supine;call light within reach;encouraged to call for assist;exit alarm on;with other staff  -AS       User Key  (r) = Recorded By, (t) = Taken By, (c) = Cosigned By    Initials Name Provider Type    AS Arti Navarrete OT Occupational Therapist        Outcome Measures     Row Name 08/04/21 1016          How much help from another is currently needed...    Putting on and taking off regular lower body clothing?  2  -AS     Bathing (including washing, rinsing, and drying)  2  -AS     Toileting (which includes using toilet bed pan or urinal)  1  -AS     Putting on and taking off regular upper body clothing  3  -AS     Taking care of personal grooming (such as brushing teeth)  3  -AS     Eating meals  3  -AS     AM-PAC 6 Clicks Score (OT)  14  -AS     Row Name 08/04/21 0800          How much help from another person do you currently need...    Turning from your back to your side while in flat bed without using bedrails?  3  -LR     Moving from lying on back to sitting on the side of a flat bed without bedrails?  3  -LR     Moving to and from a bed to a chair (including a wheelchair)?  3  -LR     Standing up from a chair using your arms (e.g., wheelchair, bedside chair)?  3  -LR     Climbing 3-5 steps with a railing?  2  -LR     To walk in hospital room?  3  -LR     AM-PAC 6 Clicks Score (PT)  17  -LR     Row Name 08/04/21 1016 08/04/21 0800       Functional Assessment    Outcome Measure Options  AM-PAC 6 Clicks Daily Activity (OT)  -AS  AM-PAC 6 Clicks Basic Mobility (PT)  -LR      User Key  (r) = Recorded By, (t) = Taken By, (c) = Cosigned By    Initials Name Provider Type    AS Arti Navarrete OT Occupational Therapist    LR Lucien Gilman Physical Therapist          Occupational Therapy Education                 Title: PT OT SLP Therapies (Done)     Topic: Occupational Therapy (Done)     Point: ADL training (Done)      Description:   Instruct learner(s) on proper safety adaptation and remediation techniques during self care or transfers.   Instruct in proper use of assistive devices.              Learning Progress Summary           Patient Acceptance, E, VU by AS at 8/4/2021 1325    Comment: UE strengthening exercises    Acceptance, E,TB, VU by SC at 7/31/2021 1007                   Point: Home exercise program (Done)     Description:   Instruct learner(s) on appropriate technique for monitoring, assisting and/or progressing therapeutic exercises/activities.              Learning Progress Summary           Patient Acceptance, E, VU by AS at 8/4/2021 1325    Comment: UE strengthening exercises    Acceptance, E,TB, VU by SC at 7/31/2021 1007                   Point: Precautions (Done)     Description:   Instruct learner(s) on prescribed precautions during self-care and functional transfers.              Learning Progress Summary           Patient Acceptance, E, VU by AS at 8/4/2021 1325    Comment: UE strengthening exercises    Acceptance, E, VU by ME at 8/3/2021 1508    Comment: Educated on OT and POC. Educated to call for assistance. Educated on safety precautions.    Acceptance, E,TB, VU by SC at 7/31/2021 1007                   Point: Body mechanics (Done)     Description:   Instruct learner(s) on proper positioning and spine alignment during self-care, functional mobility activities and/or exercises.              Learning Progress Summary           Patient Acceptance, E, VU by ME at 8/3/2021 1508    Comment: Educated on OT and POC. Educated to call for assistance. Educated on safety precautions.    Acceptance, E,TB, VU by SC at 7/31/2021 1007                               User Key     Initials Effective Dates Name Provider Type Discipline    AS 03/18/21 -  Arti Navarrete OT Occupational Therapist OT    ME 06/16/21 -  Nikole Espino OTR/L Occupational Therapist OT    SC 07/21/21 -  Angela West, RN Registered Nurse  Nurse              OT Recommendation and Plan     Plan of Care Review  Plan of Care Reviewed With: patient  Outcome Summary: OT tx completed; Pt agreeable to minimal therapy due to pain in LLE and fatigue. Pt performed supine<>sit with SBA. While seated EOB, pt performed 2 sets 10 reps of BUE strengthening exercises using 3 # dowel weight. Pt completed grooming task of oral care with s/u. After completion, pt requested to return supine to rest. Transport arrived to get pt for ultrasound. No goals met on this date, cont OT POC.     Time Calculation:   Time Calculation- OT     Row Name 08/04/21 1328             Time Calculation- OT    OT Start Time  1016  -AS      OT Stop Time  1059  -AS      OT Time Calculation (min)  43 min  -AS      OT Received On  08/04/21  -AS         Timed Charges    06732 - OT Therapeutic Exercise Minutes  33  -AS      66673 - OT Self Care/Mgmt Minutes  10  -AS         Total Minutes    Timed Charges Total Minutes  43  -AS       Total Minutes  43  -AS        User Key  (r) = Recorded By, (t) = Taken By, (c) = Cosigned By    Initials Name Provider Type    AS Arti Navarrete OT Occupational Therapist        Therapy Charges for Today     Code Description Service Date Service Provider Modifiers Qty    [id number]  OT THER PROC EA 15 MIN 8/4/2021 Arti Navarrete OT GO 2    [id number]  OT SELF CARE/MGMT/TRAIN EA 15 MIN 8/4/2021 Arti Navarrete OT GO 1               Arti Navarrete OT  8/4/2021

## 2021-08-04 NOTE — PLAN OF CARE
Goal Outcome Evaluation:  Plan of Care Reviewed With: patient           Outcome Summary: OT tx completed; Pt agreeable to minimal therapy due to pain in LLE and fatigue. Pt performed supine<>sit with SBA. While seated EOB, pt performed 2 sets 10 reps of BUE strengthening exercises using 3 # dowel weight. Pt completed grooming task of oral care with s/u. After completion, pt requested to return supine to rest. Transport arrived to get pt for ultrasound. No goals met on this date, cont OT POC.

## 2021-08-04 NOTE — PLAN OF CARE
Claritin D12 1 tablet twice a day 7 AM and 3 PM----buy a box of 10 Goal Outcome Evaluation:  Plan of Care Reviewed With: patient        Progress: improving  Outcome Summary: VVS. Pt reports pain to left leg, controlled with PRN IV pain medication. No s/s of acute distress noted. Pt slept throughout night. Will continue to monitor.

## 2021-08-04 NOTE — THERAPY EVALUATION
Patient Name: Yamileth Slater  : 1960    MRN: [id number]                              Today's Date: 2021       Admit Date: 2021    Visit Dx:     ICD-10-CM ICD-9-CM   1. Cellulitis of left leg  L03.116 682.6   2. Anemia, unspecified type  D64.9 285.9   3. Chronic kidney disease, unspecified CKD stage  N18.9 585.9   4. Type 2 diabetes mellitus with hyperglycemia, unspecified whether long term insulin use (CMS/[facility])  E11.65 250.00   5. Impaired mobility and ADLs  Z74.09 V49.89    Z78.9      Patient Active Problem List   Diagnosis   • Type 2 diabetes mellitus with diabetic polyneuropathy, with long-term current use of insulin (CMS/[facility])   • Chronic obstructive pulmonary disease (CMS/[facility])   • Chronic midline low back pain without sciatica   • Vitamin D deficiency   • Type 2 diabetes mellitus (CMS/[facility])   • Tobacco dependence syndrome   • Surgical follow-up care   • Shoulder joint pain   • Peripheral vascular disease (CMS/[facility])   • Pain   • Neurologic disorder associated with diabetes mellitus (CMS/[facility])   • Morbid obesity with BMI of 50.0-59.9, adult (CMS/[facility])   • Mixed hyperlipidemia   • Kidney stone   • History of colon polyps   • GERD (gastroesophageal reflux disease)   • Excoriated eczema   • Essential hypertension   • Encounter for medication refill   • Dyslipidemia   • Diabetes mellitus (CMS/[facility])   • Coronary artery disease   • Chronic obstructive lung disease (CMS/[facility])   • Chronic folliculitis   • Chest pain   • Mild persistent asthma   • 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   • Uncontrolled type 2 diabetes mellitus with complication, with long-term current use of insulin (CMS/[facility])   • Anemia   • Hypothyroidism   • Carotid artery disease (CMS/[facility])   • Current smoker   • DM type 2 with diabetic peripheral neuropathy (CMS/[facility])   • [place] abuse   • PAD (peripheral artery disease) (CMS/[facility])   • S/P CABG x 3   • Intercostal pain   • Venous insufficiency   • Dyspnea on exertion   •  LAFB (left anterior fascicular block)   • Pulmonary hypertension (CMS/[facility])   • Left hip pain   • Neck pain   • Acute kidney injury superimposed on chronic kidney disease (CMS/[facility])   • MIKE and COPD overlap syndrome (CMS/[facility])   • Pneumonia due to COVID-19 virus   • CKD (chronic kidney disease) stage 4, GFR 15-29 ml/min (CMS/[facility])   • Morbid obesity (CMS/[facility])   • Type 2 diabetes mellitus with hyperglycemia, with long-term current use of insulin (CMS/[facility])   • Hyponatremia   • Hyperkalemia   • Anemia   • Cutaneous candidiasis   • Community acquired pneumonia of right middle lobe of lung   • MRSA infection   • Alkaline phosphatase elevation   • COVID-19 ruled out by laboratory testing   • Esophageal dysphagia   • Monilial esophagitis (CMS/[facility])   • NSTEMI, initial episode of care (CMS/[facility])   • CAD (coronary artery disease)   • Gastrointestinal hemorrhage   • Chronic respiratory failure with hypoxia (CMS/[facility])   • Pneumonia due to infectious organism   • Chronic hypercapnic respiratory failure (CMS/[facility])   • Cellulitis of left leg     Past Medical History:   Diagnosis Date   • Anxiety    • Carotid artery stenosis    • Chronic obstructive lung disease (CMS/[facility])    • CKD (chronic kidney disease) stage 4, GFR 15-29 ml/min (CMS/[facility])    • Colonic polyp    • Coronary arteriosclerosis    • Diabetes mellitus (CMS/[facility])    • Diabetic neuropathy (CMS/[facility])    • GERD (gastroesophageal reflux disease)    • History of transfusion    • Hypercholesterolemia    • Hypertension    • Hypomagnesemia 6/27/2021    Monitor and replace   • Morbid obesity (CMS/[facility])    • Nephrolithiasis    • Peripheral vascular disease (CMS/[facility])    • Sleep apnea    • Substance abuse (CMS/[facility])    • Vitamin D deficiency      Past Surgical History:   Procedure Laterality Date   • CARDIAC CATHETERIZATION N/A 7/14/2020   • CARDIAC CATHETERIZATION N/A 4/23/2021    Procedure: Left Heart Cath;  Surgeon: Melba Romo MD;  Location: [facility] CATH INVASIVE LOCATION;  Service:  Cardiology;  Laterality: N/A;   • CARDIAC CATHETERIZATION N/A 4/30/2021    Procedure: Percutaneous Coronary Intervention;  Surgeon: Russell Voss MD;  Location: [facility] CATH INVASIVE LOCATION;  Service: Cardiovascular;  Laterality: N/A;   • CARDIAC CATHETERIZATION N/A 4/30/2021    Procedure: Stent NIKKI coronary;  Surgeon: Russell Voss MD;  Location: [facility] CATH INVASIVE LOCATION;  Service: Cardiovascular;  Laterality: N/A;   • CAROTID STENT Left    • COLONOSCOPY     • COLONOSCOPY N/A 5/14/2021    Procedure: COLONOSCOPY;  Surgeon: Mingo Duarte MD;  Location: [facility] ENDOSCOPY;  Service: Gastroenterology;  Laterality: N/A;   • CORONARY ARTERY BYPASS GRAFT N/A 2013    CABG X 3   • CYSTOSCOPY BLADDER STONE LITHOTRIPSY Bilateral    • ENDOSCOPY N/A 4/12/2021    Procedure: ESOPHAGOGASTRODUODENOSCOPY;  Surgeon: Mingo Duarte MD;  Location: [facility] ENDOSCOPY;  Service: Gastroenterology;  Laterality: N/A;   • ENDOSCOPY N/A 5/14/2021    Procedure: ESOPHAGOGASTRODUODENOSCOPY;  Surgeon: Mingo Duarte MD;  Location: [facility] ENDOSCOPY;  Service: Gastroenterology;  Laterality: N/A;     General Information     Row Name 08/04/21 0800          Physical Therapy Time and Intention    Document Type  evaluation  -LR     Mode of Treatment  individual therapy;physical therapy  -LR     Row Name 08/04/21 0800          General Information    Patient Profile Reviewed  yes  -LR     Existing Precautions/Restrictions  fall;oxygen therapy device and L/min  -LR     Barriers to Rehab  medically complex;previous functional deficit  -LR     Row Name 08/04/21 0800          Living Environment    Lives With  spouse  -LR     Row Name 08/04/21 0800          Home Main Entrance    Number of Stairs, Main Entrance  none  -LR     Row Name 08/04/21 0800          Stairs Within Home, Primary    Stairs, Within Home, Primary  has rollator and standard walker, tub shower with shower chair, raised toilet, 3L at home  -LR     Number of  Stairs, Within Home, Primary  none  -LR     Row Name 08/04/21 0800          Cognition    Orientation Status (Cognition)  oriented x 4  -LR     Row Name 08/04/21 0800          Safety Issues, Functional Mobility    Safety Issues Affecting Function (Mobility)  ability to follow commands;at risk behavior observed;awareness of need for assistance;safety precautions follow-through/compliance;safety precaution awareness;insight into deficits/self-awareness;positioning of assistive device  -LR     Impairments Affecting Function (Mobility)  balance;endurance/activity tolerance;strength;pain;shortness of breath;coordination  -LR       User Key  (r) = Recorded By, (t) = Taken By, (c) = Cosigned By    Initials Name Provider Type    LR Lucien Gilman Physical Therapist        Mobility     Row Name 08/04/21 0800          Bed Mobility    Bed Mobility  supine-sit;sit-supine;scooting/bridging  -LR     Scooting/Bridging [place] (Bed Mobility)  set up  -LR     Supine-Sit [place] (Bed Mobility)  standby assist  -LR     Sit-Supine [place] (Bed Mobility)  standby assist  -LR     Assistive Device (Bed Mobility)  bed rails;head of bed elevated  -LR     Row Name 08/04/21 0800          Sit-Stand Transfer    Sit-Stand [place] (Transfers)  contact guard  -LR     Assistive Device (Sit-Stand Transfers)  walker, front-wheeled  -LR     Row Name 08/04/21 0800          Gait/Stairs (Locomotion)    [place] Level (Gait)  contact guard  -LR     Distance in Feet (Gait)  20'x1  -LR     Deviations/Abnormal Patterns (Gait)  antalgic;gait speed decreased;festinating/shuffling;nasir decreased  -LR       User Key  (r) = Recorded By, (t) = Taken By, (c) = Cosigned By    Initials Name Provider Type    LR Lucien Gilman Physical Therapist        Obj/Interventions     Row Name 08/04/21 0800          Range of Motion Comprehensive    General Range of Motion  no range of motion deficits identified  -LR     Comment, General Range of Motion   BLE grossly, LLE limited by pain but WFL  -LR     Row Name 08/04/21 0800          Strength Comprehensive (MMT)    Comment, General Manual Muscle Testing (MMT) Assessment  RLE grossly 4-/5, LLE 4-/5  -LR     Row Name 08/04/21 0800          Sensory Assessment (Somatosensory)    Sensory Assessment (Somatosensory)  other (see comments) reports impaired sensation in hands and feet  -LR       User Key  (r) = Recorded By, (t) = Taken By, (c) = Cosigned By    Initials Name Provider Type    Lucien Francisco Physical Therapist        Goals/Plan     Row Name 08/04/21 0800          Bed Mobility Goal 1 (PT)    Activity/Assistive Device (Bed Mobility Goal 1, PT)  sit to supine;supine to sit  -LR     [place] Level/Cues Needed (Bed Mobility Goal 1, PT)  independent  -LR     Time Frame (Bed Mobility Goal 1, PT)  by discharge  -LR     Progress/Outcomes (Bed Mobility Goal 1, PT)  goal not met  -LR     Row Name 08/04/21 0800          Transfer Goal 1 (PT)    Activity/Assistive Device (Transfer Goal 1, PT)  sit-to-stand/stand-to-sit;bed-to-chair/chair-to-bed  -LR     [place] Level/Cues Needed (Transfer Goal 1, PT)  modified independence  -LR     Time Frame (Transfer Goal 1, PT)  by discharge  -LR     Progress/Outcome (Transfer Goal 1, PT)  goal not met  -LR     Row Name 08/04/21 0800          Gait Training Goal 1 (PT)    Activity/Assistive Device (Gait Training Goal 1, PT)  gait (walking locomotion);assistive device use;backward stepping  -LR     [place] Level (Gait Training Goal 1, PT)  modified independence  -LR     Distance (Gait Training Goal 1, PT)  25'x2  -LR     Time Frame (Gait Training Goal 1, PT)  by discharge  -LR     Progress/Outcome (Gait Training Goal 1, PT)  goal not met  -LR       User Key  (r) = Recorded By, (t) = Taken By, (c) = Cosigned By    Initials Name Provider Type    Lucien Francisco Physical Therapist        Clinical Impression     Row Name 08/04/21 0800          Pain Scale: Numbers  Pre/Post-Treatment    Pretreatment Pain Rating  7/10  -LR     Posttreatment Pain Rating  7/10  -LR     Pain Location - Side  Left  -LR     Pain Location - Orientation  lower  -LR     Pain Location  extremity  -LR     Row Name 08/04/21 0800          Plan of Care Review    Plan of Care Reviewed With  patient  -LR     Outcome Summary  PT eval completed on this date. Patient pleasant but required some encouragement to participate with PT. Bed mobility: Set up for scooting up in bed, SBA for supine<>sit transfer with HOB up, bed rails, and increased time. Transfers: CGA for sit<>stand transfer with RW. Gait: CGA for 20'x1 with RW fwd/bkwd. Patient presents with antalgic gait, decrease step length, and gait speed. Recommend bariatric walker with 5 inch wheels.  -LR     Row Name 08/04/21 0800          Therapy Assessment/Plan (PT)    Patient/Family Therapy Goals Statement (PT)  Patient wants to return home  -LR     Rehab Potential (PT)  good, to achieve stated therapy goals  -LR     Criteria for Skilled Interventions Met (PT)  yes;meets criteria;skilled treatment is necessary  -LR     Predicted Duration of Therapy Intervention (PT)  Until d/c or until all goals met  -LR     Row Name 08/04/21 0800          Vital Signs    Pre Systolic BP Rehab  119  -LR     Pre Treatment Diastolic BP  59  -LR     Pretreatment Heart Rate (beats/min)  65  -LR     Pre SpO2 (%)  100  -LR     O2 Delivery Pre Treatment  supplemental O2  -LR     Pre Patient Position  Supine  -LR     Row Name 08/04/21 0800          Positioning and Restraints    Pre-Treatment Position  in bed  -LR     Post Treatment Position  bed  -LR     In Bed  notified nsg;call light within reach;encouraged to call for assist;exit alarm on  -LR       User Key  (r) = Recorded By, (t) = Taken By, (c) = Cosigned By    Initials Name Provider Type    LR Lucien Gilman Physical Therapist        Outcome Measures     Row Name 08/04/21 0800          How much help from another person do you  currently need...    Turning from your back to your side while in flat bed without using bedrails?  3  -LR     Moving from lying on back to sitting on the side of a flat bed without bedrails?  3  -LR     Moving to and from a bed to a chair (including a wheelchair)?  3  -LR     Standing up from a chair using your arms (e.g., wheelchair, bedside chair)?  3  -LR     Climbing 3-5 steps with a railing?  2  -LR     To walk in hospital room?  3  -LR     AM-PAC 6 Clicks Score (PT)  17  -LR     Row Name 08/04/21 0800          Functional Assessment    Outcome Measure Options  AM-PAC 6 Clicks Basic Mobility (PT)  -LR       User Key  (r) = Recorded By, (t) = Taken By, (c) = Cosigned By    Initials Name Provider Type    LR Lucien Gilman Physical Therapist                       Physical Therapy Education                 Title: PT OT SLP Therapies (Done)     Topic: Physical Therapy (Done)     Point: Mobility training (Done)     Learning Progress Summary           Patient Acceptance, E,TB, VU by  at 8/4/2021 0821    Comment: Educated on PT POC and goals.                   Point: Home exercise program (Done)     Learning Progress Summary           Patient Acceptance, E,TB, VU by  at 8/4/2021 0821    Comment: Educated on PT POC and goals.                   Point: Body mechanics (Done)     Learning Progress Summary           Patient Acceptance, E,TB, VU by  at 8/4/2021 0821    Comment: Educated on PT POC and goals.                   Point: Precautions (Done)     Learning Progress Summary           Patient Acceptance, E,TB, VU by  at 8/4/2021 0821    Comment: Educated on PT POC and goals.    Acceptance, E,TB, VU by SC at 7/31/2021 1007                               User Key     Initials Effective Dates Name Provider Type Discipline    LR 06/16/21 -  Lucien Gilman Physical Therapist PT    SC 07/21/21 -  Angela West RN Registered Nurse Nurse              PT Recommendation and Plan  Planned Therapy Interventions (PT):  balance training, neuromuscular re-education, bed mobility training, gait training, home exercise program, patient/family education, transfer training, vestibular therapy, wheelchair management/propulsion training, postural re-education, joint mobilization, prosthetic fitting/training, ROM (range of motion), manual therapy techniques, lumbar stabilization, motor coordination training, stair training, strengthening, stretching  Plan of Care Reviewed With: patient  Outcome Summary: PT eval completed on this date. Patient pleasant but required some encouragement to participate with PT. Bed mobility: Set up for scooting up in bed, SBA for supine<>sit transfer with HOB up, bed rails, and increased time. Transfers: CGA for sit<>stand transfer with RW. Gait: CGA for 20'x1 with RW fwd/bkwd. Patient presents with antalgic gait, decrease step length, and gait speed. Recommend bariatric walker with 5 inch wheels.     Time Calculation:   PT Charges     Row Name 08/04/21 1136             Time Calculation    Start Time  0800  -LR      Stop Time  0840  -LR      Time Calculation (min)  40 min  -LR      PT Received On  08/04/21  -LR      PT Goal Re-Cert Due Date  08/17/21  -LR         Untimed Charges    PT Eval/Re-eval Minutes  40  -LR         Total Minutes    Untimed Charges Total Minutes  40  -LR       Total Minutes  40  -LR        User Key  (r) = Recorded By, (t) = Taken By, (c) = Cosigned By    Initials Name Provider Type    LR Lucien Gilman Physical Therapist        Therapy Charges for Today     Code Description Service Date Service Provider Modifiers Qty    [id number] HC PT EVAL MOD COMPLEXITY 3 8/4/2021 Lucien Gilman GP 1          PT G-Codes  Outcome Measure Options: AM-PAC 6 Clicks Basic Mobility (PT)  AM-PAC 6 Clicks Score (PT): 17  AM-PAC 6 Clicks Score (OT): 14    Lucien Gilman  8/4/2021

## 2021-08-05 LAB
ANION GAP SERPL CALCULATED.3IONS-SCNC: 9 MMOL/L (ref 5–15)
BASOPHILS # BLD AUTO: 0.09 10*3/MM3 (ref 0–0.2)
BASOPHILS NFR BLD AUTO: 1.1 % (ref 0–1.5)
BUN SERPL-MCNC: 34 MG/DL (ref 8–23)
BUN/CREAT SERPL: 17.1 (ref 7–25)
CALCIUM SPEC-SCNC: 9.3 MG/DL (ref 8.6–10.5)
CHLORIDE SERPL-SCNC: 102 MMOL/L (ref 98–107)
CO2 SERPL-SCNC: 22 MMOL/L (ref 22–29)
CREAT SERPL-MCNC: 1.99 MG/DL (ref 0.57–1)
CRP SERPL-MCNC: 9.59 MG/DL (ref 0–0.5)
DEPRECATED RDW RBC AUTO: 45.7 FL (ref 37–54)
EOSINOPHIL # BLD AUTO: 0.24 10*3/MM3 (ref 0–0.4)
EOSINOPHIL NFR BLD AUTO: 3 % (ref 0.3–6.2)
ERYTHROCYTE [DISTWIDTH] IN BLOOD BY AUTOMATED COUNT: 14.7 % (ref 12.3–15.4)
GFR SERPL CREATININE-BSD FRML MDRD: 25 ML/MIN/1.73
GLUCOSE BLDC GLUCOMTR-MCNC: 231 MG/DL (ref 70–130)
GLUCOSE BLDC GLUCOMTR-MCNC: 317 MG/DL (ref 70–130)
GLUCOSE BLDC GLUCOMTR-MCNC: 320 MG/DL (ref 70–130)
GLUCOSE BLDC GLUCOMTR-MCNC: 418 MG/DL (ref 70–130)
GLUCOSE SERPL-MCNC: 210 MG/DL (ref 65–99)
HCT VFR BLD AUTO: 22.9 % (ref 34–46.6)
HGB BLD-MCNC: 7.2 G/DL (ref 12–15.9)
IMM GRANULOCYTES # BLD AUTO: 0.1 10*3/MM3 (ref 0–0.05)
IMM GRANULOCYTES NFR BLD AUTO: 1.3 % (ref 0–0.5)
LYMPHOCYTES # BLD AUTO: 2.76 10*3/MM3 (ref 0.7–3.1)
LYMPHOCYTES NFR BLD AUTO: 35 % (ref 19.6–45.3)
MCH RBC QN AUTO: 27 PG (ref 26.6–33)
MCHC RBC AUTO-ENTMCNC: 31.4 G/DL (ref 31.5–35.7)
MCV RBC AUTO: 85.8 FL (ref 79–97)
MONOCYTES # BLD AUTO: 0.51 10*3/MM3 (ref 0.1–0.9)
MONOCYTES NFR BLD AUTO: 6.5 % (ref 5–12)
NEUTROPHILS NFR BLD AUTO: 4.19 10*3/MM3 (ref 1.7–7)
NEUTROPHILS NFR BLD AUTO: 53.1 % (ref 42.7–76)
NRBC BLD AUTO-RTO: 0 /100 WBC (ref 0–0.2)
PLATELET # BLD AUTO: 288 10*3/MM3 (ref 140–450)
PMV BLD AUTO: 8.8 FL (ref 6–12)
POTASSIUM SERPL-SCNC: 4.6 MMOL/L (ref 3.5–5.2)
RBC # BLD AUTO: 2.67 10*6/MM3 (ref 3.77–5.28)
SODIUM SERPL-SCNC: 133 MMOL/L (ref 136–145)
WBC # BLD AUTO: 7.89 10*3/MM3 (ref 3.4–10.8)

## 2021-08-05 PROCEDURE — 80048 BASIC METABOLIC PNL TOTAL CA: CPT | Performed by: FAMILY MEDICINE

## 2021-08-05 PROCEDURE — 25010000002 CYANOCOBALAMIN PER 1000 MCG: Performed by: INTERNAL MEDICINE

## 2021-08-05 PROCEDURE — 25010000002 HEPARIN (PORCINE) PER 1000 UNITS: Performed by: INTERNAL MEDICINE

## 2021-08-05 PROCEDURE — 63710000001 INSULIN ASPART PER 5 UNITS: Performed by: INTERNAL MEDICINE

## 2021-08-05 PROCEDURE — 25010000002 VANCOMYCIN 5 G RECONSTITUTED SOLUTION: Performed by: FAMILY MEDICINE

## 2021-08-05 PROCEDURE — 97530 THERAPEUTIC ACTIVITIES: CPT

## 2021-08-05 PROCEDURE — 99221 1ST HOSP IP/OBS SF/LOW 40: CPT | Performed by: SURGERY

## 2021-08-05 PROCEDURE — 85025 COMPLETE CBC W/AUTO DIFF WBC: CPT | Performed by: FAMILY MEDICINE

## 2021-08-05 PROCEDURE — 63710000001 INSULIN DETEMIR PER 5 UNITS: Performed by: FAMILY MEDICINE

## 2021-08-05 PROCEDURE — 25010000002 HYDROMORPHONE 1 MG/ML SOLUTION: Performed by: INTERNAL MEDICINE

## 2021-08-05 PROCEDURE — 25010000002 MORPHINE PER 10 MG: Performed by: INTERNAL MEDICINE

## 2021-08-05 PROCEDURE — 97535 SELF CARE MNGMENT TRAINING: CPT

## 2021-08-05 PROCEDURE — 25010000002 CEFEPIME PER 500 MG: Performed by: FAMILY MEDICINE

## 2021-08-05 PROCEDURE — 82962 GLUCOSE BLOOD TEST: CPT

## 2021-08-05 PROCEDURE — 25010000002 NA FERRIC GLUC CPLX PER 12.5 MG: Performed by: INTERNAL MEDICINE

## 2021-08-05 PROCEDURE — 86140 C-REACTIVE PROTEIN: CPT | Performed by: FAMILY MEDICINE

## 2021-08-05 PROCEDURE — 25010000002 CEFEPIME 2 G/NS 100 ML SOLUTION: Performed by: FAMILY MEDICINE

## 2021-08-05 PROCEDURE — 97110 THERAPEUTIC EXERCISES: CPT

## 2021-08-05 RX ORDER — CYANOCOBALAMIN 1000 UG/ML
1000 INJECTION, SOLUTION INTRAMUSCULAR; SUBCUTANEOUS ONCE
Status: COMPLETED | OUTPATIENT
Start: 2021-08-05 | End: 2021-08-05

## 2021-08-05 RX ORDER — BUMETANIDE 0.25 MG/ML
2 INJECTION INTRAMUSCULAR; INTRAVENOUS DAILY
Status: DISCONTINUED | OUTPATIENT
Start: 2021-08-05 | End: 2021-08-09

## 2021-08-05 RX ADMIN — HEPARIN SODIUM 5000 UNITS: 5000 INJECTION INTRAVENOUS; SUBCUTANEOUS at 21:18

## 2021-08-05 RX ADMIN — HYDROCODONE BITARTRATE AND ACETAMINOPHEN 1 TABLET: 5; 325 TABLET ORAL at 19:08

## 2021-08-05 RX ADMIN — SODIUM CHLORIDE, PRESERVATIVE FREE 10 ML: 5 INJECTION INTRAVENOUS at 09:00

## 2021-08-05 RX ADMIN — INSULIN ASPART 7 UNITS: 100 INJECTION, SOLUTION INTRAVENOUS; SUBCUTANEOUS at 12:27

## 2021-08-05 RX ADMIN — MORPHINE SULFATE 1 MG: 2 INJECTION, SOLUTION INTRAMUSCULAR; INTRAVENOUS at 21:26

## 2021-08-05 RX ADMIN — METOPROLOL TARTRATE 100 MG: 50 TABLET, FILM COATED ORAL at 21:19

## 2021-08-05 RX ADMIN — INSULIN DETEMIR 50 UNITS: 100 INJECTION, SOLUTION SUBCUTANEOUS at 08:11

## 2021-08-05 RX ADMIN — HEPARIN SODIUM 5000 UNITS: 5000 INJECTION INTRAVENOUS; SUBCUTANEOUS at 08:14

## 2021-08-05 RX ADMIN — CEFEPIME HYDROCHLORIDE 2 G: 2 INJECTION, POWDER, FOR SOLUTION INTRAVENOUS at 12:08

## 2021-08-05 RX ADMIN — SODIUM BICARBONATE 1300 MG: 650 TABLET ORAL at 08:12

## 2021-08-05 RX ADMIN — DOCOSANOL: 100 CREAM TOPICAL at 17:46

## 2021-08-05 RX ADMIN — ISOSORBIDE MONONITRATE 30 MG: 30 TABLET, EXTENDED RELEASE ORAL at 08:13

## 2021-08-05 RX ADMIN — CEFEPIME HYDROCHLORIDE 2 G: 2 INJECTION, POWDER, FOR SOLUTION INTRAVENOUS at 23:06

## 2021-08-05 RX ADMIN — DOCOSANOL: 100 CREAM TOPICAL at 16:33

## 2021-08-05 RX ADMIN — SODIUM BICARBONATE 1300 MG: 650 TABLET ORAL at 16:29

## 2021-08-05 RX ADMIN — INSULIN ASPART 7 UNITS: 100 INJECTION, SOLUTION INTRAVENOUS; SUBCUTANEOUS at 17:46

## 2021-08-05 RX ADMIN — SODIUM BICARBONATE 1300 MG: 650 TABLET ORAL at 21:18

## 2021-08-05 RX ADMIN — GABAPENTIN 800 MG: 400 CAPSULE ORAL at 08:12

## 2021-08-05 RX ADMIN — SODIUM CHLORIDE 125 MG: 9 INJECTION, SOLUTION INTRAVENOUS at 13:00

## 2021-08-05 RX ADMIN — HYDROMORPHONE HYDROCHLORIDE 0.5 MG: 1 INJECTION, SOLUTION INTRAMUSCULAR; INTRAVENOUS; SUBCUTANEOUS at 16:29

## 2021-08-05 RX ADMIN — DOCOSANOL 1 APPLICATION: 100 CREAM TOPICAL at 21:26

## 2021-08-05 RX ADMIN — INSULIN DETEMIR 50 UNITS: 100 INJECTION, SOLUTION SUBCUTANEOUS at 21:26

## 2021-08-05 RX ADMIN — METOPROLOL TARTRATE 100 MG: 50 TABLET, FILM COATED ORAL at 08:12

## 2021-08-05 RX ADMIN — SODIUM CHLORIDE, PRESERVATIVE FREE 10 ML: 5 INJECTION INTRAVENOUS at 21:19

## 2021-08-05 RX ADMIN — DOCOSANOL: 100 CREAM TOPICAL at 06:06

## 2021-08-05 RX ADMIN — OFLOXACIN 50000 UNITS: 300 TABLET, COATED ORAL at 11:47

## 2021-08-05 RX ADMIN — METRONIDAZOLE 500 MG: 500 INJECTION, SOLUTION INTRAVENOUS at 19:09

## 2021-08-05 RX ADMIN — OXYBUTYNIN CHLORIDE 5 MG: 5 TABLET, EXTENDED RELEASE ORAL at 08:13

## 2021-08-05 RX ADMIN — HYDROCODONE BITARTRATE AND ACETAMINOPHEN 1 TABLET: 5; 325 TABLET ORAL at 05:34

## 2021-08-05 RX ADMIN — METRONIDAZOLE 500 MG: 500 INJECTION, SOLUTION INTRAVENOUS at 11:00

## 2021-08-05 RX ADMIN — HYDROMORPHONE HYDROCHLORIDE 0.5 MG: 1 INJECTION, SOLUTION INTRAMUSCULAR; INTRAVENOUS; SUBCUTANEOUS at 08:41

## 2021-08-05 RX ADMIN — DULOXETINE HYDROCHLORIDE 20 MG: 20 CAPSULE, DELAYED RELEASE ORAL at 08:13

## 2021-08-05 RX ADMIN — GABAPENTIN 800 MG: 400 CAPSULE ORAL at 21:18

## 2021-08-05 RX ADMIN — GABAPENTIN 800 MG: 400 CAPSULE ORAL at 16:29

## 2021-08-05 RX ADMIN — CLOPIDOGREL BISULFATE 75 MG: 75 TABLET ORAL at 08:12

## 2021-08-05 RX ADMIN — HYDROCODONE BITARTRATE AND ACETAMINOPHEN 1 TABLET: 5; 325 TABLET ORAL at 11:48

## 2021-08-05 RX ADMIN — CYANOCOBALAMIN 1000 MCG: 1000 INJECTION, SOLUTION INTRAMUSCULAR at 11:47

## 2021-08-05 RX ADMIN — ASPIRIN 81 MG: 81 TABLET, FILM COATED ORAL at 08:12

## 2021-08-05 RX ADMIN — VANCOMYCIN HYDROCHLORIDE 750 MG: 5 INJECTION, POWDER, LYOPHILIZED, FOR SOLUTION INTRAVENOUS at 20:25

## 2021-08-05 RX ADMIN — BUMETANIDE 2 MG: 0.25 INJECTION INTRAMUSCULAR; INTRAVENOUS at 11:47

## 2021-08-05 RX ADMIN — DOCOSANOL: 100 CREAM TOPICAL at 11:47

## 2021-08-05 RX ADMIN — INSULIN ASPART 4 UNITS: 100 INJECTION, SOLUTION INTRAVENOUS; SUBCUTANEOUS at 08:13

## 2021-08-05 RX ADMIN — INSULIN ASPART 9 UNITS: 100 INJECTION, SOLUTION INTRAVENOUS; SUBCUTANEOUS at 21:33

## 2021-08-05 RX ADMIN — ATORVASTATIN CALCIUM 40 MG: 40 TABLET, FILM COATED ORAL at 08:12

## 2021-08-05 NOTE — THERAPY TREATMENT NOTE
Patient Name: Yamileth Slater  : 1960    MRN: [id number]                              Today's Date: 2021       Admit Date: 2021    Visit Dx:     ICD-10-CM ICD-9-CM   1. Cellulitis of left leg  L03.116 682.6   2. Anemia, unspecified type  D64.9 285.9   3. Chronic kidney disease, unspecified CKD stage  N18.9 585.9   4. Type 2 diabetes mellitus with hyperglycemia, unspecified whether long term insulin use (CMS/[facility])  E11.65 250.00   5. Impaired mobility and ADLs  Z74.09 V49.89    Z78.9      Patient Active Problem List   Diagnosis   • Type 2 diabetes mellitus with diabetic polyneuropathy, with long-term current use of insulin (CMS/[facility])   • Chronic obstructive pulmonary disease (CMS/[facility])   • Chronic midline low back pain without sciatica   • Vitamin D deficiency   • Type 2 diabetes mellitus (CMS/[facility])   • Tobacco dependence syndrome   • Surgical follow-up care   • Shoulder joint pain   • Peripheral vascular disease (CMS/[facility])   • Pain   • Neurologic disorder associated with diabetes mellitus (CMS/[facility])   • Morbid obesity with BMI of 50.0-59.9, adult (CMS/[facility])   • Mixed hyperlipidemia   • Kidney stone   • History of colon polyps   • GERD (gastroesophageal reflux disease)   • Excoriated eczema   • Essential hypertension   • Encounter for medication refill   • Dyslipidemia   • Diabetes mellitus (CMS/[facility])   • Coronary artery disease   • Chronic obstructive lung disease (CMS/[facility])   • Chronic folliculitis   • Chest pain   • Mild persistent asthma   • 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   • Uncontrolled type 2 diabetes mellitus with complication, with long-term current use of insulin (CMS/[facility])   • Anemia   • Hypothyroidism   • Carotid artery disease (CMS/[facility])   • Current smoker   • DM type 2 with diabetic peripheral neuropathy (CMS/[facility])   • [place] abuse   • PAD (peripheral artery disease) (CMS/[facility])   • S/P CABG x 3   • Intercostal pain   • Venous insufficiency   • Dyspnea on exertion   •  LAFB (left anterior fascicular block)   • Pulmonary hypertension (CMS/[facility])   • Left hip pain   • Neck pain   • Acute kidney injury superimposed on chronic kidney disease (CMS/[facility])   • MIKE and COPD overlap syndrome (CMS/[facility])   • Pneumonia due to COVID-19 virus   • CKD (chronic kidney disease) stage 4, GFR 15-29 ml/min (CMS/[facility])   • Morbid obesity (CMS/[facility])   • Type 2 diabetes mellitus with hyperglycemia, with long-term current use of insulin (CMS/[facility])   • Hyponatremia   • Hyperkalemia   • Anemia   • Cutaneous candidiasis   • Community acquired pneumonia of right middle lobe of lung   • MRSA infection   • Alkaline phosphatase elevation   • COVID-19 ruled out by laboratory testing   • Esophageal dysphagia   • Monilial esophagitis (CMS/[facility])   • NSTEMI, initial episode of care (CMS/[facility])   • CAD (coronary artery disease)   • Gastrointestinal hemorrhage   • Chronic respiratory failure with hypoxia (CMS/[facility])   • Pneumonia due to infectious organism   • Chronic hypercapnic respiratory failure (CMS/[facility])   • Cellulitis of left leg     Past Medical History:   Diagnosis Date   • Anxiety    • Carotid artery stenosis    • Chronic obstructive lung disease (CMS/[facility])    • CKD (chronic kidney disease) stage 4, GFR 15-29 ml/min (CMS/[facility])    • Colonic polyp    • Coronary arteriosclerosis    • Diabetes mellitus (CMS/[facility])    • Diabetic neuropathy (CMS/[facility])    • GERD (gastroesophageal reflux disease)    • History of transfusion    • Hypercholesterolemia    • Hypertension    • Hypomagnesemia 6/27/2021    Monitor and replace   • Morbid obesity (CMS/[facility])    • Nephrolithiasis    • Peripheral vascular disease (CMS/[facility])    • Sleep apnea    • Substance abuse (CMS/[facility])    • Vitamin D deficiency      Past Surgical History:   Procedure Laterality Date   • CARDIAC CATHETERIZATION N/A 7/14/2020   • CARDIAC CATHETERIZATION N/A 4/23/2021    Procedure: Left Heart Cath;  Surgeon: Melba Romo MD;  Location: [facility] CATH INVASIVE LOCATION;  Service:  Cardiology;  Laterality: N/A;   • CARDIAC CATHETERIZATION N/A 4/30/2021    Procedure: Percutaneous Coronary Intervention;  Surgeon: Russell Voss MD;  Location: [facility] CATH INVASIVE LOCATION;  Service: Cardiovascular;  Laterality: N/A;   • CARDIAC CATHETERIZATION N/A 4/30/2021    Procedure: Stent NIKKI coronary;  Surgeon: Russell Voss MD;  Location: [facility] CATH INVASIVE LOCATION;  Service: Cardiovascular;  Laterality: N/A;   • CAROTID STENT Left    • COLONOSCOPY     • COLONOSCOPY N/A 5/14/2021    Procedure: COLONOSCOPY;  Surgeon: Mingo Duarte MD;  Location: [facility] ENDOSCOPY;  Service: Gastroenterology;  Laterality: N/A;   • CORONARY ARTERY BYPASS GRAFT N/A 2013    CABG X 3   • CYSTOSCOPY BLADDER STONE LITHOTRIPSY Bilateral    • ENDOSCOPY N/A 4/12/2021    Procedure: ESOPHAGOGASTRODUODENOSCOPY;  Surgeon: Mingo Duarte MD;  Location: [facility] ENDOSCOPY;  Service: Gastroenterology;  Laterality: N/A;   • ENDOSCOPY N/A 5/14/2021    Procedure: ESOPHAGOGASTRODUODENOSCOPY;  Surgeon: Mingo Duarte MD;  Location: [facility] ENDOSCOPY;  Service: Gastroenterology;  Laterality: N/A;     General Information     Row Name 08/05/21 0729          OT Time and Intention    Document Type  therapy note (daily note)  -     Mode of Treatment  individual therapy;occupational therapy  -     Row Name 08/05/21 0729          General Information    Patient Profile Reviewed  yes  -     Existing Precautions/Restrictions  fall;oxygen therapy device and L/min  -     Row Name 08/05/21 0729          Cognition    Orientation Status (Cognition)  oriented x 4  -     Row Name 08/05/21 0729          Safety Issues, Functional Mobility    Safety Issues Affecting Function (Mobility)  insight into deficits/self-awareness;safety precautions follow-through/compliance  -     Impairments Affecting Function (Mobility)  balance;endurance/activity tolerance;strength;pain  -       User Key  (r) = Recorded By, (t) = Taken By, (c)  = Cosigned By    Initials Name Provider Type     Ottoniel Robles, SARA Occupational Therapist          Mobility/ADL's     Row Name 08/05/21 0729          Bed Mobility    Supine-Sit Bertie (Bed Mobility)  standby assist  -     Assistive Device (Bed Mobility)  bed rails;head of bed elevated  -[facility] Name 08/05/21 0729          Transfers    Transfers  sit-stand transfer;toilet transfer  -     Sit-Stand Bertie (Transfers)  contact guard  -     Bertie Level (Toilet Transfer)  contact guard  -     Assistive Device (Toilet Transfer)  commode, bedside without drop arms;grab bars/safety frame;walker, front-wheeled over toilet  -[facility] Name 08/05/21 0729          Sit-Stand Transfer    Assistive Device (Sit-Stand Transfers)  walker, front-wheeled  -[facility] Name 08/05/21 0729          Toilet Transfer    Type (Toilet Transfer)  sit-stand;stand-sit  -[facility] Name 08/05/21 0729          Activities of Daily Living    BADL Assessment/Intervention  bathing;upper body dressing;grooming;toileting  -[facility] Name 08/05/21 0729          Toileting Assessment/Training    Bertie Level (Toileting)  moderate assist (50% patient effort)  -     Assistive Devices (Toileting)  -- commode over toilet  -     Position (Toileting)  supported standing  -[facility] Name 08/05/21 0729          Grooming Assessment/Training    Bertie Level (Grooming)  oral care regimen;wash face, hands;supervision  -     Position (Grooming)  supported standing;sink side  -SJ     Row Name 08/05/21 0729          Bathing Assessment/Intervention    Bertie Level (Bathing)  bathing skills;upper body;set up;lower body;moderate assist (50% patient effort)  -SJ     Position (Bathing)  supported sitting  -     Comment (Bathing)  sitting on commode  -[facility] Name 08/05/21 0729          Upper Body Dressing Assessment/Training    Bertie Level (Upper Body Dressing)  doff;don;set up  -SJ     Position (Upper  Body Dressing)  supported sitting  -SJ     Comment (Upper Body Dressing)  hopsital gown  -SJ       User Key  (r) = Recorded By, (t) = Taken By, (c) = Cosigned By    Initials Name Provider Type    Ottoniel Pardo OT Occupational Therapist        Obj/Interventions    No documentation.       Goals/Plan     Row Name 08/05/21 0729          Transfer Goal 1 (OT)    Activity/Assistive Device (Transfer Goal 1, OT)  toilet  -SJ     [place] Level/Cues Needed (Transfer Goal 1, OT)  supervision required  -SJ     Time Frame (Transfer Goal 1, OT)  long term goal (LTG);by discharge  -SJ     Progress/Outcome (Transfer Goal 1, OT)  goal not met  -     Row Name 08/05/21 0729          Bathing Goal 1 (OT)    Activity/Device (Bathing Goal 1, OT)  upper body bathing AD as needed  -SJ     [place] Level/Cues Needed (Bathing Goal 1, OT)  set-up required;supervision required  -SJ     Time Frame (Bathing Goal 1, OT)  long term goal (LTG);by discharge  -SJ     Progress/Outcomes (Bathing Goal 1, OT)  goal not met  -     Row Name 08/05/21 0729          Dressing Goal 1 (OT)    Activity/Device (Dressing Goal 1, OT)  upper body dressing AD as needed  -SJ     [place]/Cues Needed (Dressing Goal 1, OT)  supervision required;set-up required  -SJ     Time Frame (Dressing Goal 1, OT)  long term goal (LTG);by discharge  -SJ     Progress/Outcome (Dressing Goal 1, OT)  goal not met  -     Row Name 08/05/21 0729          Toileting Goal 1 (OT)    Activity/Device (Toileting Goal 1, OT)  toileting skills, all  -SJ     [place] Level/Cues Needed (Toileting Goal 1, OT)  set-up required;supervision required  -SJ     Time Frame (Toileting Goal 1, OT)  long term goal (LTG);by discharge  -SJ     Progress/Outcome (Toileting Goal 1, OT)  goal not met  -SJ       User Key  (r) = Recorded By, (t) = Taken By, (c) = Cosigned By    Initials Name Provider Type    Ottoniel Pardo OT Occupational Therapist        Clinical Impression     Row  Name 08/05/21 0729          Pain Scale: Numbers Pre/Post-Treatment    Pretreatment Pain Rating  9/10  -SJ     Posttreatment Pain Rating  9/10  -SJ     Pain Location - Side  Left  -     Pain Location  extremity  -[facility] Name 08/05/21 0729          Plan of Care Review    Plan of Care Reviewed With  patient  -     Progress  improving  -     Outcome Summary  Pt agreeable for OT treatment, supine in bed upon arrival. Supine to sit performed with SBA with HOB elevated. Sit to stand, toilet transfer (with commode over toilet), and toilet to chair transfer performed with CGA and RW this session. Bathing with wipes performed on commode (set up for UB; mod assist for LE bathing). Toileting with moderate assistance, assist for posterior and pericare. Grooming at sink with supervision for oral care and handwashing/face washing. Patient in chair at end of session, all needs in reach. Continue with POC.  -[facility] Name 08/05/21 0729          Therapy Assessment/Plan (OT)    Rehab Potential (OT)  good, to achieve stated therapy goals  -     Criteria for Skilled Therapeutic Interventions Met (OT)  yes;meets criteria;skilled treatment is necessary  -[facility] Name 08/05/21 0729          Therapy Plan Review/Discharge Plan (OT)    Anticipated Discharge Disposition (OT)  home;home with assist;home with home health  -[facility] Name 08/05/21 0729          Vital Signs    Pre Systolic BP Rehab  103  -SJ     Pre Treatment Diastolic BP  57  -SJ     Post Systolic BP Rehab  101  -SJ     Post Treatment Diastolic BP  74  -SJ     Pretreatment Heart Rate (beats/min)  67  -SJ     Posttreatment Heart Rate (beats/min)  64  -SJ     Pre SpO2 (%)  96  -SJ     O2 Delivery Pre Treatment  nasal cannula 3L  -SJ     Post SpO2 (%)  98  -SJ     O2 Delivery Post Treatment  nasal cannula 3L  -SJ     Pre Patient Position  Supine  -SJ     Post Patient Position  Sitting  -     Row Name 08/05/21 0729          Positioning and Restraints     Pre-Treatment Position  in bed  -SJ     Post Treatment Position  chair  -SJ     In Chair  reclined;call light within reach;encouraged to call for assist;with other staff handed off to nursing assistant  -       User Key  (r) = Recorded By, (t) = Taken By, (c) = Cosigned By    Initials Name Provider Type    Ottoniel Pardo OT Occupational Therapist        Outcome Measures     Row Name 08/05/21 0729          How much help from another is currently needed...    Putting on and taking off regular lower body clothing?  2  -SJ     Bathing (including washing, rinsing, and drying)  2  -SJ     Toileting (which includes using toilet bed pan or urinal)  2  -SJ     Putting on and taking off regular upper body clothing  4  -SJ     Taking care of personal grooming (such as brushing teeth)  4  -SJ     Eating meals  4  -SJ     AM-PAC 6 Clicks Score (OT)  18  -     Row Name 08/05/21 0729          Functional Assessment    Outcome Measure Options  AM-PAC 6 Clicks Daily Activity (OT)  RUST       User Key  (r) = Recorded By, (t) = Taken By, (c) = Cosigned By    Initials Name Provider Type    Ottoniel Pardo OT Occupational Therapist          Occupational Therapy Education                 Title: PT OT SLP Therapies (Done)     Topic: Occupational Therapy (Done)     Point: ADL training (Done)     Description:   Instruct learner(s) on proper safety adaptation and remediation techniques during self care or transfers.   Instruct in proper use of assistive devices.              Learning Progress Summary           Patient Acceptance, E,TB, VU,NR by  at 8/5/2021 0824    Comment: POC, role of OT, transfer training    Acceptance, E, VU by AS at 8/4/2021 1325    Comment: UE strengthening exercises    Acceptance, E,TB, VU by SC at 7/31/2021 1007                   Point: Home exercise program (Done)     Description:   Instruct learner(s) on appropriate technique for monitoring, assisting and/or progressing therapeutic  exercises/activities.              Learning Progress Summary           Patient Acceptance, E, VU by AS at 8/4/2021 1325    Comment: UE strengthening exercises    Acceptance, E,TB, VU by SC at 7/31/2021 1007                   Point: Precautions (Done)     Description:   Instruct learner(s) on prescribed precautions during self-care and functional transfers.              Learning Progress Summary           Patient Acceptance, E,TB, VU,NR by  at 8/5/2021 0824    Comment: POC, role of OT, transfer training    Acceptance, E, VU by AS at 8/4/2021 1325    Comment: UE strengthening exercises    Acceptance, E, VU by ME at 8/3/2021 1508    Comment: Educated on OT and POC. Educated to call for assistance. Educated on safety precautions.    Acceptance, E,TB, VU by SC at 7/31/2021 1007                   Point: Body mechanics (Done)     Description:   Instruct learner(s) on proper positioning and spine alignment during self-care, functional mobility activities and/or exercises.              Learning Progress Summary           Patient Acceptance, E,TB, VU,NR by  at 8/5/2021 0824    Comment: POC, role of OT, transfer training    Acceptance, E, VU by ME at 8/3/2021 1508    Comment: Educated on OT and POC. Educated to call for assistance. Educated on safety precautions.    Acceptance, E,TB, VU by SC at 7/31/2021 1007                               User Key     Initials Effective Dates Name Provider Type Discipline    AS 03/18/21 -  Arti Navarrete, OT Occupational Therapist OT    ME 06/16/21 -  Nikole Espino OTR/L Occupational Therapist OT     06/14/21 -  Ottoniel Robles, OT Occupational Therapist OT    SC 07/21/21 -  Angela West RN Registered Nurse Nurse              OT Recommendation and Plan     Plan of Care Review  Plan of Care Reviewed With: patient  Progress: improving  Outcome Summary: Pt agreeable for OT treatment, supine in bed upon arrival. Supine to sit performed with SBA with HOB elevated. Sit to stand,  toilet transfer (with commode over toilet), and toilet to chair transfer performed with CGA and RW this session. Bathing with wipes performed on commode (set up for UB; mod assist for LE bathing). Toileting with moderate assistance, assist for posterior and pericare. Grooming at sink with supervision for oral care and handwashing/face washing. Patient in chair at end of session, all needs in reach. Continue with POC.     Time Calculation:   Time Calculation- OT     Row Name 08/05/21 0729             Time Calculation- OT    OT Start Time  0729  -      OT Stop Time  0810  -      OT Time Calculation (min)  41 min  -      Total Timed Code Minutes- OT  41 minute(s)  -      OT Received On  08/05/21  -         Timed Charges    35550 - OT Self Care/Mgmt Minutes  41  -SJ         Total Minutes    Timed Charges Total Minutes  41  -SJ       Total Minutes  41  -SJ        User Key  (r) = Recorded By, (t) = Taken By, (c) = Cosigned By    Initials Name Provider Type     Ottoniel Robles OT Occupational Therapist        Therapy Charges for Today     Code Description Service Date Service Provider Modifiers Qty    [id number]  OT SELF CARE/MGMT/TRAIN EA 15 MIN 8/5/2021 Ottoniel Robles OT GO 3               Ottoniel Robles OT  8/5/2021

## 2021-08-05 NOTE — PLAN OF CARE
Goal Outcome Evaluation:  Plan of Care Reviewed With: patient        Progress: improving  Outcome Summary: Pt agreeable for OT treatment, supine in bed upon arrival. Supine to sit performed with SBA with HOB elevated. Sit to stand, toilet transfer (with commode over toilet), and toilet to chair transfer performed with CGA and RW this session. Bathing with wipes performed on commode (set up for UB; mod assist for LE bathing). Toileting with moderate assistance, assist for posterior and pericare. Grooming at sink with supervision for oral care and handwashing/face washing. Patient in chair at end of session, all needs in reach. Continue with POC.

## 2021-08-05 NOTE — PLAN OF CARE
Problem: Adult Inpatient Plan of Care  Goal: Plan of Care Review  Outcome: Ongoing, Progressing  Goal: Patient-Specific Goal (Individualized)  Outcome: Ongoing, Progressing  Goal: Absence of Hospital-Acquired Illness or Injury  Outcome: Ongoing, Progressing  Intervention: Identify and Manage Fall Risk  Recent Flowsheet Documentation  Taken 8/5/2021 1216 by Angela West RN  Safety Promotion/Fall Prevention: safety round/check completed  Intervention: Prevent Skin Injury  Recent Flowsheet Documentation  Taken 8/5/2021 1216 by Angela West RN  Body Position: weight shift assistance provided  Skin Protection: incontinence pads utilized  Intervention: Prevent and Manage VTE (venous thromboembolism) Risk  Recent Flowsheet Documentation  Taken 8/5/2021 1216 by Angela West RN  VTE Prevention/Management: (heparin) other (see comments)  Goal: Optimal Comfort and Wellbeing  Outcome: Ongoing, Progressing  Intervention: Provide Person-Centered Care  Recent Flowsheet Documentation  Taken 8/5/2021 1216 by Angela West RN  Trust Relationship/Rapport: care explained  Goal: Readiness for Transition of Care  Outcome: Ongoing, Progressing     Problem: Skin or Soft Tissue Infection  Goal: Infection Symptom Resolution  Outcome: Ongoing, Progressing  Intervention: Provide Meticulous Infection Site Care  Recent Flowsheet Documentation  Taken 8/5/2021 1216 by Angela West RN  Topical Inflammation Care: border marked     Problem: COPD Comorbidity  Goal: Maintenance of COPD Symptom Control  Outcome: Ongoing, Progressing     Problem: Hypertension Comorbidity  Goal: Blood Pressure in Desired Range  Outcome: Ongoing, Progressing     Problem: Fall Injury Risk  Goal: Absence of Fall and Fall-Related Injury  Outcome: Ongoing, Progressing  Intervention: Promote Injury-Free Environment  Recent Flowsheet Documentation  Taken 8/5/2021 1216 by Angela West RN  Safety Promotion/Fall Prevention: safety round/check  completed   Goal Outcome Evaluation:

## 2021-08-05 NOTE — PROGRESS NOTES
[facility] Medicine Services  INPATIENT PROGRESS NOTE    Length of Stay: 2  Date of Admission: 7/30/2021  Primary Care Physician: Rianna Macias MD    Subjective   Chief Complaint: Lower extremity cellulitis  HPI: Still having swelling and erythema of her left lower extremity.  Ultrasound yesterday negative for signs of abscess.  She reports ongoing issues with pain.    Review of Systems   Constitutional: Negative for chills and fever.   HENT: Negative for congestion.    Respiratory: Negative for shortness of breath.    Cardiovascular: Negative for chest pain.   Gastrointestinal: Negative for abdominal pain, constipation, diarrhea, nausea and vomiting.   Genitourinary: Negative.    Musculoskeletal: Positive for arthralgias and myalgias.   Neurological: Negative.    Psychiatric/Behavioral: Negative.    All other systems reviewed and are negative.     All pertinent negatives and positives are as above. All other systems have been reviewed and are negative unless otherwise stated.     Objective    Temp:  [96.8 °F (36 °C)-98.1 °F (36.7 °C)] 97.6 °F (36.4 °C)  Heart Rate:  [62-73] 67  Resp:  [18-20] 18  BP: (103-176)/(55-94) 103/57    Physical Exam  Constitutional:       General: She is not in acute distress.     Appearance: She is not toxic-appearing.   HENT:      Head: Normocephalic and atraumatic.      Right Ear: External ear normal.      Left Ear: External ear normal.      Nose: Nose normal.      Mouth/Throat:      Mouth: Mucous membranes are moist.      Pharynx: Oropharynx is clear.   Eyes:      Conjunctiva/sclera: Conjunctivae normal.   Cardiovascular:      Rate and Rhythm: Normal rate and regular rhythm.      Pulses: Normal pulses.      Heart sounds: Normal heart sounds.   Pulmonary:      Effort: Pulmonary effort is normal. No respiratory distress.      Breath sounds: Normal breath sounds.   Abdominal:      General: Bowel sounds are normal.      Palpations: Abdomen is  soft.      Tenderness: There is no abdominal tenderness.   Musculoskeletal:         General: Swelling present.      Cervical back: Neck supple.   Skin:     General: Skin is warm.      Capillary Refill: Capillary refill takes less than 2 seconds.      Findings: Erythema present.      Comments: LLE induration, noted with erythema consistent with cellulitis.    Neurological:      Mental Status: She is alert.   Psychiatric:         Mood and Affect: Mood normal.         Behavior: Behavior normal.         Results Review:  I have reviewed the labs, radiology results, and diagnostic studies.    Laboratory Data:   Results from last 7 days   Lab Units 08/05/21 0523 08/04/21 0526 08/03/21 0523 07/31/21 0541 07/30/21 2011   SODIUM mmol/L 133* 130* 134*   < > 128*   POTASSIUM mmol/L 4.6 4.6 4.8   < > 3.7   CHLORIDE mmol/L 102 100 103   < > 94*   CO2 mmol/L 22.0 20.0* 21.0*   < > 25.0   BUN mg/dL 34* 36* 41*   < > 41*   CREATININE mg/dL 1.99* 2.14* 2.58*   < > 2.08*   GLUCOSE mg/dL 210* 226* 228*   < > 305*   CALCIUM mg/dL 9.3 8.6 8.7   < > 8.7   BILIRUBIN mg/dL  --   --   --   --  0.2   ALK PHOS U/L  --   --   --   --  206*   ALT (SGPT) U/L  --   --   --   --  12   AST (SGOT) U/L  --   --   --   --  19   ANION GAP mmol/L 9.0 10.0 10.0   < > 9.0    < > = values in this interval not displayed.     Estimated Creatinine Clearance: 42 mL/min (A) (by C-G formula based on SCr of 1.99 mg/dL (H)).  Results from last 7 days   Lab Units 08/01/21 0544 07/31/21 2228 07/31/21 0541   MAGNESIUM mg/dL 2.3 1.8 1.4*         Results from last 7 days   Lab Units 08/05/21 0523 08/04/21 0526 08/03/21 0523 08/02/21 0518 08/01/21  0544   WBC 10*3/mm3 7.89 8.78 9.71 9.26 8.90   HEMOGLOBIN g/dL 7.2* 7.2* 7.6* 7.4* 7.2*   HEMATOCRIT % 22.9* 22.8* 24.5* 22.9* 23.1*   PLATELETS 10*3/mm3 288 267 260 242 280           Culture Data:   No results found for: BLOODCX  No results found for: URINECX  No results found for: RESPCX  No results found for:  WOUNDCX  No results found for: STOOLCX  No components found for: BODYFLD    Radiology Data:   Imaging Results (Last 24 Hours)     ** No results found for the last 24 hours. **          I have reviewed the patient's current medications.     Assessment/Plan     Active Problems:    Cellulitis of left leg  ERIKA on CKD 4  CAD  COPD  Chronic anemia  Type 2 diabetes mellitus    Plan:  -Continue vancomycin and cefepime for now.  She previously been on Zosyn but this was stopped due to ERIKA  -Given failure to improve we will add in metronidazole for anaerobic coverage  -Ultrasound was negative for abscess but also given failure to improve we will ask general surgery to evaluate.  Appreciate their help in advance.  -Nephrology's been consulted given her renal disease and acute on chronic kidney disease.  Appreciate their help.  -COPD appears stable this time, continue current meds and interventions  -Anemia slightly worse but likely related to renal disease, continue to monitor for any active signs of bleeding  -Continue current pain medications  -Continue current insulin dosing and glucose checks  -DVT prophylaxis with heparin  -CODE STATUS: Full      I confirmed that the patient's Advance Care Plan is present, code status is documented, or surrogate decision maker is listed in the patient's medical record.     Discharge Planning: In process.    Esau Ferguson MD

## 2021-08-05 NOTE — CONSULTS
Adult Nutrition  Assessment    Patient Name:  Yamileth Slater  YOB: 1960  MRN: [id number]  Admit Date:  7/30/2021    Assessment Date:  8/5/2021    Comments:  Pt visited due to LOS. She was with therapy and then nsg so RD was unable to speak to pt in any great length.  She said that her meals were going well.  She is being managed for Cellulitis and swelling of the lower ext., on Bumex.  She presents at 233% IBW and has a BMI of 48.97 compatible with morbid obesity.  Intake is 100%.  Will monitor and attempt to follow up tomorrow    Reason for Assessment     Row Name 08/05/21 1611          Reason for Assessment    Reason For Assessment  per organizational policy     Diagnosis  other (see comments) Cellulitis     Identified At Risk by Screening Criteria  other (see comments) LOS         Nutrition/Diet History     Row Name 08/05/21 1611          Nutrition/Diet History    Typical Food/Fluid Intake  Pt working with therapy X1; with nsg X1.  She waved at me and reports meals are good           Labs/Tests/Procedures/Meds     Row Name 08/05/21 1612          Labs/Procedures/Meds    Lab Results Reviewed  reviewed, pertinent        Diagnostic Tests/Procedures    Diagnostic Test/Procedure Reviewed  reviewed, pertinent        Medications    Pertinent Medications Reviewed  reviewed, pertinent         Physical Findings     Row Name 08/05/21 1612          Physical Findings    Overall Physical Appearance  obese;on oxygen therapy     Skin  other (see comments);edema Cellulitis         Estimated/Assessed Needs     Row Name 08/05/21 1612          Calculation Measurements    Weight Used For Calculations  59 kg (130 lb) ibw        Estimated/Assessed Needs    Additional Documentation  Additional Requirements (Group);Fluid Requirements (Group);Athena-St. Jeor Equation (Group);Protein Requirements (Group);Calorie Requirements (Group);KCAL/KG (Group)        Calorie Requirements    Estimated Calorie Need Method   Johnston-St Jeor        Johnston-St. Jeor Equation    RMR (Johnston-St. Jeor Equation)  1166.43     Johnston-St. Jeor Activity Factors  1.4 - 1.5     Activity Factors (Johnston-St. Jeor)  1633.002 - 1749.645        Protein Requirements    Weight Used For Protein Calculations  59 kg (130 lb)     Est Protein Requirement Amount (gms/kg)  1.2 gm protein     Estimated Protein Requirements (gms/day)  70.76        Fluid Requirements    Fluid Requirements (mL/day)  1750     Estimated Fluid Requirement Method  RDA Method     RDA Method (mL)  1750         Nutrition Prescription Ordered     Row Name 08/05/21 1613          Nutrition Prescription PO    Current PO Diet  Regular     Common Modifiers  Cardiac;Consistent Carbohydrate;Renal         Evaluation of Received Nutrient/Fluid Intake     Row Name 08/05/21 1614          PO Evaluation    Number of Meals  6     % PO Intake  100%               Electronically signed by:  Gissel Littlejohn RD  08/05/21 16:23 CDT

## 2021-08-05 NOTE — PLAN OF CARE
Goal Outcome Evaluation:  Plan of Care Reviewed With: patient           Outcome Summary: pt sitting in chair upon arrival, sit<>stand with RW & CGA of 1. pt would bnefit from home with 24/7 care & HHPT @ D/C

## 2021-08-05 NOTE — THERAPY TREATMENT NOTE
Acute Care - Physical Therapy Treatment Note  [facility]     Patient Name: Yamileth Slater  : 1960  MRN: [id number]  Today's Date: 2021      Visit Dx:     ICD-10-CM ICD-9-CM   1. Cellulitis of left leg  L03.116 682.6   2. Anemia, unspecified type  D64.9 285.9   3. Chronic kidney disease, unspecified CKD stage  N18.9 585.9   4. Type 2 diabetes mellitus with hyperglycemia, unspecified whether long term insulin use (CMS/[facility])  E11.65 250.00   5. Impaired mobility and ADLs  Z74.09 V49.89    Z78.9      Patient Active Problem List   Diagnosis   • Type 2 diabetes mellitus with diabetic polyneuropathy, with long-term current use of insulin (CMS/[facility])   • Chronic obstructive pulmonary disease (CMS/[facility])   • Chronic midline low back pain without sciatica   • Vitamin D deficiency   • Type 2 diabetes mellitus (CMS/[facility])   • Tobacco dependence syndrome   • Surgical follow-up care   • Shoulder joint pain   • Peripheral vascular disease (CMS/[facility])   • Pain   • Neurologic disorder associated with diabetes mellitus (CMS/[facility])   • Morbid obesity with BMI of 50.0-59.9, adult (CMS/[facility])   • Mixed hyperlipidemia   • Kidney stone   • History of colon polyps   • GERD (gastroesophageal reflux disease)   • Excoriated eczema   • Essential hypertension   • Encounter for medication refill   • Dyslipidemia   • Diabetes mellitus (CMS/[facility])   • Coronary artery disease   • Chronic obstructive lung disease (CMS/[facility])   • Chronic folliculitis   • Chest pain   • Mild persistent asthma   • 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   • Uncontrolled type 2 diabetes mellitus with complication, with long-term current use of insulin (CMS/[facility])   • Anemia   • Hypothyroidism   • Carotid artery disease (CMS/[facility])   • Current smoker   • DM type 2 with diabetic peripheral neuropathy (CMS/[facility])   • [place] abuse   • PAD (peripheral artery disease) (CMS/[facility])   • S/P CABG x 3   • Intercostal pain   • Venous insufficiency   • Dyspnea on  exertion   • LAFB (left anterior fascicular block)   • Pulmonary hypertension (CMS/[facility])   • Left hip pain   • Neck pain   • Acute kidney injury superimposed on chronic kidney disease (CMS/[facility])   • MIKE and COPD overlap syndrome (CMS/[facility])   • Pneumonia due to COVID-19 virus   • CKD (chronic kidney disease) stage 4, GFR 15-29 ml/min (CMS/[facility])   • Morbid obesity (CMS/[facility])   • Type 2 diabetes mellitus with hyperglycemia, with long-term current use of insulin (CMS/[facility])   • Hyponatremia   • Hyperkalemia   • Anemia   • Cutaneous candidiasis   • Community acquired pneumonia of right middle lobe of lung   • MRSA infection   • Alkaline phosphatase elevation   • COVID-19 ruled out by laboratory testing   • Esophageal dysphagia   • Monilial esophagitis (CMS/[facility])   • NSTEMI, initial episode of care (CMS/[facility])   • CAD (coronary artery disease)   • Gastrointestinal hemorrhage   • Chronic respiratory failure with hypoxia (CMS/[facility])   • Pneumonia due to infectious organism   • Chronic hypercapnic respiratory failure (CMS/[facility])   • Cellulitis of left leg     Past Medical History:   Diagnosis Date   • Anxiety    • Carotid artery stenosis    • Chronic obstructive lung disease (CMS/[facility])    • CKD (chronic kidney disease) stage 4, GFR 15-29 ml/min (CMS/[facility])    • Colonic polyp    • Coronary arteriosclerosis    • Diabetes mellitus (CMS/[facility])    • Diabetic neuropathy (CMS/[facility])    • GERD (gastroesophageal reflux disease)    • History of transfusion    • Hypercholesterolemia    • Hypertension    • Hypomagnesemia 6/27/2021    Monitor and replace   • Morbid obesity (CMS/[facility])    • Nephrolithiasis    • Peripheral vascular disease (CMS/[facility])    • Sleep apnea    • Substance abuse (CMS/[facility])    • Vitamin D deficiency      Past Surgical History:   Procedure Laterality Date   • CARDIAC CATHETERIZATION N/A 7/14/2020   • CARDIAC CATHETERIZATION N/A 4/23/2021    Procedure: Left Heart Cath;  Surgeon: Melba Romo MD;  Location: [facility] CATH INVASIVE  LOCATION;  Service: Cardiology;  Laterality: N/A;   • CARDIAC CATHETERIZATION N/A 4/30/2021    Procedure: Percutaneous Coronary Intervention;  Surgeon: Russell Voss MD;  Location: [facility] CATH INVASIVE LOCATION;  Service: Cardiovascular;  Laterality: N/A;   • CARDIAC CATHETERIZATION N/A 4/30/2021    Procedure: Stent NIKKI coronary;  Surgeon: Russell Voss MD;  Location: [facility] CATH INVASIVE LOCATION;  Service: Cardiovascular;  Laterality: N/A;   • CAROTID STENT Left    • COLONOSCOPY     • COLONOSCOPY N/A 5/14/2021    Procedure: COLONOSCOPY;  Surgeon: Mingo Duarte MD;  Location: [facility] ENDOSCOPY;  Service: Gastroenterology;  Laterality: N/A;   • CORONARY ARTERY BYPASS GRAFT N/A 2013    CABG X 3   • CYSTOSCOPY BLADDER STONE LITHOTRIPSY Bilateral    • ENDOSCOPY N/A 4/12/2021    Procedure: ESOPHAGOGASTRODUODENOSCOPY;  Surgeon: Mingo Duarte MD;  Location: [facility] ENDOSCOPY;  Service: Gastroenterology;  Laterality: N/A;   • ENDOSCOPY N/A 5/14/2021    Procedure: ESOPHAGOGASTRODUODENOSCOPY;  Surgeon: Mingo Duarte MD;  Location: [facility] ENDOSCOPY;  Service: Gastroenterology;  Laterality: N/A;        PT Assessment (last 12 hours)      PT Evaluation and Treatment     Row Name 08/05/21 1539          Physical Therapy Time and Intention    Subjective Information  complains of;pain  -TA     Document Type  therapy note (daily note)  -TA     Mode of Treatment  individual therapy;physical therapy  -TA     Row Name 08/05/21 1539          General Information    Patient Profile Reviewed  yes  -TA     Existing Precautions/Restrictions  fall;oxygen therapy device and L/min  -TA     Row Name 08/05/21 1539          Cognition    Orientation Status (Cognition)  oriented x 4  -TA     Personal Safety Interventions  fall prevention program maintained;gait belt;muscle strengthening facilitated;nonskid shoes/slippers when out of bed;supervised activity  -TA     Row Name 08/05/21 1539          Pain Scale: Numbers  Pre/Post-Treatment    Pretreatment Pain Rating  10/10  -TA     Posttreatment Pain Rating  10/10  -TA     Pain Location - Side  Left  -TA     Pain Location - Orientation  lower  -TA     Pain Location  extremity  -TA     [facility] Name 08/05/21 1539          Range of Motion Comprehensive    General Range of Motion  no range of motion deficits identified  -TA     Row Name 08/05/21 1539          Bed Mobility    Bed Mobility  supine-sit;sit-supine;scooting/bridging  -TA     Scooting/Bridging [place] (Bed Mobility)  set up  -TA     Supine-Sit [place] (Bed Mobility)  not tested  -TA     Sit-Supine [place] (Bed Mobility)  not tested  -TA     Assistive Device (Bed Mobility)  bed rails;head of bed elevated  -TA     Row Name 08/05/21 1539          Transfers    Transfers  stand-sit transfer  -TA     Sit-Stand [place] (Transfers)  contact guard x 2  -TA     Stand-Sit [place] (Transfers)  contact guard x 2  -TA     [facility] Name 08/05/21 1539          Sit-Stand Transfer    Assistive Device (Sit-Stand Transfers)  walker, front-wheeled  -TA     [facility] Name 08/05/21 1539          Stand-Sit Transfer    Assistive Device (Stand-Sit Transfers)  walker, front-wheeled  -TA     [facility] Name 08/05/21 1539          Gait/Stairs (Locomotion)    [place] Level (Gait)  not tested  -TA     Deviations/Abnormal Patterns (Gait)  --  -[facility] Name 08/05/21 1539          Safety Issues, Functional Mobility    Impairments Affecting Function (Mobility)  balance;endurance/activity tolerance;strength;pain;shortness of breath;coordination  -TA     [facility] Name 08/05/21 1539          Hip (Therapeutic Exercise)    Hip (Therapeutic Exercise)  AROM (active range of motion)  -TA     Hip AROM (Therapeutic Exercise)  bilateral;flexion;sitting;10 repetitions;5 repetitions  -TA     [facility] Name 08/05/21 1539          Knee (Therapeutic Exercise)    Knee (Therapeutic Exercise)  AROM (active range of motion)  -TA     Knee AROM (Therapeutic Exercise)   bilateral;LAQ (long arc quad);sitting;10 repetitions;5 repetitions  -TA     Row Name 08/05/21 1539          Ankle (Therapeutic Exercise)    Ankle (Therapeutic Exercise)  AROM (active range of motion)  -TA     Ankle AROM (Therapeutic Exercise)  bilateral;dorsiflexion;plantarflexion;10 repetitions;5 repetitions  -TA     Row Name 08/05/21 1539          Plan of Care Review    Plan of Care Reviewed With  patient  -TA     Outcome Summary  pt sitting in chair upon arrival, sit<>stand with RW & CGA of 1. pt would bnefit from home with 24/7 care & HHPT @ D/C  -TA     Row Name 08/05/21 1539          Vital Signs    Pre Systolic BP Rehab  173  -TA     Pre Treatment Diastolic BP  73  -TA     Post Systolic BP Rehab  188  -TA     Post Treatment Diastolic BP  77  -TA     Pretreatment Heart Rate (beats/min)  65  -TA     Posttreatment Heart Rate (beats/min)  70  -TA     Pre SpO2 (%)  97  -TA     O2 Delivery Pre Treatment  supplemental O2  -TA     Post SpO2 (%)  95  -TA     O2 Delivery Post Treatment  supplemental O2  -TA     Pre Patient Position  Sitting  -TA     Post Patient Position  Sitting  -TA     Row Name 08/05/21 1539          Bed Mobility Goal 1 (PT)    Activity/Assistive Device (Bed Mobility Goal 1, PT)  sit to supine;supine to sit  -TA     [place] Level/Cues Needed (Bed Mobility Goal 1, PT)  independent  -TA     Time Frame (Bed Mobility Goal 1, PT)  by discharge  -TA     Progress/Outcomes (Bed Mobility Goal 1, PT)  goal not met  -TA     Row Name 08/05/21 1539          Transfer Goal 1 (PT)    Activity/Assistive Device (Transfer Goal 1, PT)  sit-to-stand/stand-to-sit;bed-to-chair/chair-to-bed  -TA     [place] Level/Cues Needed (Transfer Goal 1, PT)  modified independence  -TA     Time Frame (Transfer Goal 1, PT)  by discharge  -TA     Progress/Outcome (Transfer Goal 1, PT)  goal not met  -TA     Row Name 08/05/21 1539          Gait Training Goal 1 (PT)    Activity/Assistive Device (Gait Training Goal 1, PT)  gait  (walking locomotion);assistive device use;backward stepping  -TA     [place] Level (Gait Training Goal 1, PT)  modified independence  -TA     Distance (Gait Training Goal 1, PT)  25'x2  -TA     Time Frame (Gait Training Goal 1, PT)  by discharge  -TA     Progress/Outcome (Gait Training Goal 1, PT)  goal not met  -TA     Row Name 08/05/21 1539          Positioning and Restraints    Pre-Treatment Position  sitting in chair/recliner  -TA     Post Treatment Position  chair  -TA     In Chair  sitting;call light within reach  -TA     Row Name 08/05/21 1539          Therapy Assessment/Plan (PT)    Rehab Potential (PT)  good, to achieve stated therapy goals  -TA     Criteria for Skilled Interventions Met (PT)  yes;meets criteria;skilled treatment is necessary  -TA     Comment, Therapy Assessment/Plan (PT)  continue  -TA       User Key  (r) = Recorded By, (t) = Taken By, (c) = Cosigned By    Initials Name Provider Type    Sue Matute, PTA Physical Therapy Assistant        Physical Therapy Education                 Title: PT OT SLP Therapies (Done)     Topic: Physical Therapy (Done)     Point: Mobility training (Done)     Learning Progress Summary           Patient Acceptance, E,TB, VU by LR at 8/4/2021 0821    Comment: Educated on PT POC and goals.                   Point: Home exercise program (Done)     Learning Progress Summary           Patient Acceptance, E,TB, VU by LR at 8/4/2021 0821    Comment: Educated on PT POC and goals.                   Point: Body mechanics (Done)     Learning Progress Summary           Patient Acceptance, E,TB, VU by LR at 8/4/2021 0821    Comment: Educated on PT POC and goals.                   Point: Precautions (Done)     Learning Progress Summary           Patient Acceptance, E,TB, VU by LR at 8/4/2021 0821    Comment: Educated on PT POC and goals.    Acceptance, E,TB, VU by SC at 7/31/2021 1007                               User Key     Initials Effective Dates Name Provider  Type Discipline    LR 06/16/21 -  Lucien Gilman Physical Therapist PT    SC 07/21/21 -  Angela West RN Registered Nurse Nurse              PT Recommendation and Plan  Anticipated Discharge Disposition (PT): home with 24/7 care, home with home health, skilled nursing facility  Therapy Frequency (PT): other (see comments) (5-7d/week)  Plan of Care Reviewed With: patient  Outcome Summary: pt sitting in chair upon arrival, sit<>stand with RW & CGA of 1. pt would bnefit from home with 24/7 care & HHPT @ D/C  Outcome Measures     Row Name 08/05/21 1600             How much help from another person do you currently need...    Turning from your back to your side while in flat bed without using bedrails?  3  -TA      Moving from lying on back to sitting on the side of a flat bed without bedrails?  3  -TA      Moving to and from a bed to a chair (including a wheelchair)?  3  -TA      Standing up from a chair using your arms (e.g., wheelchair, bedside chair)?  3  -TA      Climbing 3-5 steps with a railing?  2  -TA      To walk in hospital room?  3  -TA      AM-PAC 6 Clicks Score (PT)  17  -TA         Functional Assessment    Outcome Measure Options  AM-PAC 6 Clicks Basic Mobility (PT)  -TA        User Key  (r) = Recorded By, (t) = Taken By, (c) = Cosigned By    Initials Name Provider Type    TA Sue Bird PTA Physical Therapy Assistant           Time Calculation:   PT Charges     Row Name 08/05/21 1614             Time Calculation    Start Time  1539  -TA      Stop Time  1602  -TA      Time Calculation (min)  23 min  -TA      PT Received On  08/05/21  -TA         Time Calculation- PT    Total Timed Code Minutes- PT  23 minute(s)  -TA         Timed Charges    10103 - PT Therapeutic Exercise Minutes  15  -TA      13195 - PT Therapeutic Activity Minutes  8  -TA         Total Minutes    Timed Charges Total Minutes  23  -TA       Total Minutes  23  -TA        User Key  (r) = Recorded By, (t) = Taken By, (c) = Cosigned  By    Initials Name Provider Type    TA Sue Bird PTA Physical Therapy Assistant        Therapy Charges for Today     Code Description Service Date Service Provider Modifiers Qty    69062246701 HC PT THERAPEUTIC ACT EA 15 MIN 8/5/2021 Sue Bird PTA GP 1    97023680708 HC PT THER PROC EA 15 MIN 8/5/2021 Sue Bird PTA GP 1          PT G-Codes  Outcome Measure Options: AM-PAC 6 Clicks Basic Mobility (PT)  AM-PAC 6 Clicks Score (PT): 17  AM-PAC 6 Clicks Score (OT): 18    Sue Bird PTA  8/5/2021

## 2021-08-05 NOTE — PROGRESS NOTES
"Pharmacokinetics by Pharmacy - Vancomycin    Yamileth Slater is a 61 y.o. female receiving vancomycin 1250mg IV Q24H day 6 for skin and soft tissue infection  Patient is also receiving cefepime    Objective:  [Ht: 167.6 cm (66\"); Wt: (!) 138 kg (303 lb 6.4 oz)]     WBC   Date Value Ref Range Status   08/05/2021 7.89 3.40 - 10.80 10*3/mm3 Final   08/04/2021 8.78 3.40 - 10.80 10*3/mm3 Final   08/03/2021 9.71 3.40 - 10.80 10*3/mm3 Final      C-Reactive Protein   Date Value Ref Range Status   08/04/2021 11.37 (H) 0.00 - 0.50 mg/dL Final   08/03/2021 13.29 (H) 0.00 - 0.50 mg/dL Final   08/02/2021 11.40 (H) 0.00 - 0.50 mg/dL Final     Lactate   Date Value Ref Range Status   07/30/2021 0.8 0.5 - 2.0 mmol/L Final   09/10/2018 1.3 0.5 - 2.0 mmol/L Final   09/10/2018 2.1 (C) 0.5 - 2.0 mmol/L Final      Temp Readings from Last 1 Encounters:   08/05/21 97.6 °F (36.4 °C) (Temporal)     Estimated Creatinine Clearance: 42 mL/min (A) (by C-G formula based on SCr of 1.99 mg/dL (H)).   Creatinine   Date Value Ref Range Status   08/05/2021 1.99 (H) 0.57 - 1.00 mg/dL Final   08/04/2021 2.14 (H) 0.57 - 1.00 mg/dL Final   08/03/2021 2.58 (H) 0.57 - 1.00 mg/dL Final       Vancomycin Trough   Date Value Ref Range Status   08/04/2021 24.90 (H) 5.00 - 20.00 mcg/mL Final   08/01/2021 22.10 (H) 5.00 - 20.00 mcg/mL Final     Vancomycin Random   Date Value Ref Range Status   04/02/2021 15.80 5.00 - 40.00 mcg/mL Final       Culture Results:  Microbiology Results (last 10 days)       Procedure Component Value - Date/Time    Blood Culture - Blood, Arm, Left [[id number]] Collected: 07/30/21 2011    Lab Status: Final result Specimen: Blood from Arm, Left Updated: 08/04/21 2045     Blood Culture No growth at 5 days    Narrative:      Plated and returned to blood culture incubator at 2200    Blood Culture - Blood, Wrist, Right [[id number]] Collected: 07/30/21 2008    Lab Status: Final result Specimen: Blood from Wrist, Right Updated: 08/04/21 2045 "     Blood Culture No growth at 5 days    COVID-19 and FLU A/B PCR - Swab, Nasopharynx [[id number]]  (Normal) Collected: 07/30/21 1929    Lab Status: Final result Specimen: Swab from Nasopharynx Updated: 07/30/21 2001     COVID19 Not Detected     Influenza A PCR Not Detected     Influenza B PCR Not Detected    Narrative:      Fact sheet for providers: https://www.fda.gov/media/113246/download    Fact sheet for patients: https://www.fda.gov/media/007878/download    Test performed by PCR.          No results found for: RESPCX      Assessment:  WBC WNL, trending down  SCr Trending down appropriately, above normal limits  Afebrile    Labs in progress:  All finalized    Vancomycin extended yesterday to last dose of 8/7 2000  Ultrasound 8/4 showed continued edema without abscess  BMI 48.9       Plan:  1. Continue vancomycin 750mg IV Q24H  2. Will check trough 8/7 1930 in case vancomycin extended and due to two previous supratherapeutic levels  3. Pharmacy will monitor renal function and adjust dose accordingly.      Eusebio Huertas, PharmD   08/05/21 09:39 CDT

## 2021-08-05 NOTE — CONSULTS
GENERAL SURGERY CONSULT    Referring Provider: Dr. Ferguson  Reason for Consultation: Left leg cellulitis    Patient Care Team:  Rianna Macias MD as PCP - General (Family Medicine)    Chief complaint pain and swelling of left leg    Subjective .     History of present illness: 61-year-old obese lady who has been in the hospital for several days due to swelling and pain in the left lower extremity.  She also had ERIKA which seems to be resolving.  Patient's been on antibiotics for her cellulitis first at [facility] where she was fairly recently discharged and then at our hospital upon presentation.  She notes continued pain in the leg.  An ultrasound was performed yesterday that showed no drainable fluid collections.  She also had a Doppler ultrasound done on 7/30/2021 that showed no evidence of DVT in the leg.  She does note that it seems to be improving although slowly.    Review of Systems    Review of Systems   Cardiovascular: Positive for leg swelling.   Musculoskeletal: Positive for gait problem.         History  Past Medical History:   Diagnosis Date   • Anxiety    • Carotid artery stenosis    • Chronic obstructive lung disease (CMS/HCC)    • CKD (chronic kidney disease) stage 4, GFR 15-29 ml/min (CMS/HCC)    • Colonic polyp    • Coronary arteriosclerosis    • Diabetes mellitus (CMS/HCC)    • Diabetic neuropathy (CMS/HCC)    • GERD (gastroesophageal reflux disease)    • History of transfusion    • Hypercholesterolemia    • Hypertension    • Hypomagnesemia 6/27/2021    Monitor and replace   • Morbid obesity (CMS/HCC)    • Nephrolithiasis    • Peripheral vascular disease (CMS/HCC)    • Sleep apnea    • Substance abuse (CMS/HCC)    • Vitamin D deficiency    ,   Past Surgical History:   Procedure Laterality Date   • CARDIAC CATHETERIZATION N/A 7/14/2020   • CARDIAC CATHETERIZATION N/A 4/23/2021    Procedure: Left Heart Cath;  Surgeon: Melba Romo MD;  Location: [facility] CATH INVASIVE LOCATION;   Service: Cardiology;  Laterality: N/A;   • CARDIAC CATHETERIZATION N/A 4/30/2021    Procedure: Percutaneous Coronary Intervention;  Surgeon: Russell Voss MD;  Location: [facility] CATH INVASIVE LOCATION;  Service: Cardiovascular;  Laterality: N/A;   • CARDIAC CATHETERIZATION N/A 4/30/2021    Procedure: Stent NIKKI coronary;  Surgeon: Russell Voss MD;  Location: [facility] CATH INVASIVE LOCATION;  Service: Cardiovascular;  Laterality: N/A;   • CAROTID STENT Left    • COLONOSCOPY     • COLONOSCOPY N/A 5/14/2021    Procedure: COLONOSCOPY;  Surgeon: Mingo Duarte MD;  Location: [facility] ENDOSCOPY;  Service: Gastroenterology;  Laterality: N/A;   • CORONARY ARTERY BYPASS GRAFT N/A 2013    CABG X 3   • CYSTOSCOPY BLADDER STONE LITHOTRIPSY Bilateral    • ENDOSCOPY N/A 4/12/2021    Procedure: ESOPHAGOGASTRODUODENOSCOPY;  Surgeon: Mingo Duarte MD;  Location: [facility] ENDOSCOPY;  Service: Gastroenterology;  Laterality: N/A;   • ENDOSCOPY N/A 5/14/2021    Procedure: ESOPHAGOGASTRODUODENOSCOPY;  Surgeon: Mingo Duarte MD;  Location: [facility] ENDOSCOPY;  Service: Gastroenterology;  Laterality: N/A;   ,   Family History   Problem Relation Age of Onset   • Heart disease Mother    • Heart disease Father    • Heart disease Sister    • Heart disease Brother    ,   Social History     Tobacco Use   • Smoking status: Light Tobacco Smoker     Packs/day: 0.25     Years: 46.00     Pack years: 11.50     Types: Cigarettes   • Smokeless tobacco: Never Used   • Tobacco comment: only smoking 5 a day    Substance Use Topics   • Alcohol use: No   • Drug use: Not Currently     Types: LSD, Marijuana, Methamphetamines   ,   Medications Prior to Admission   Medication Sig Dispense Refill Last Dose   • albuterol sulfate  (90 Base) MCG/ACT inhaler Inhale 2 puffs Every 4 (Four) Hours As Needed for Wheezing. 18 g 1 7/30/2021 at Unknown time   • aspirin (aspirin) 81 MG EC tablet Take 1 tablet by mouth Daily. 60 tablet 5 7/30/2021  at Unknown time   • atorvastatin (LIPITOR) 40 MG tablet Take 1 tablet by mouth Daily. (Patient taking differently: Take 40 mg by mouth Every Night.) 90 tablet 0 7/30/2021 at Unknown time   • cetirizine (zyrTEC) 10 MG tablet Take 1 tablet by mouth Daily. 30 tablet 3 7/30/2021 at Unknown time   • clopidogrel (PLAVIX) 75 MG tablet Take 1 tablet by mouth Daily. 30 tablet 5 7/30/2021 at Unknown time   • cyclobenzaprine (FLEXERIL) 10 MG tablet Take 1 tablet by mouth 2 (Two) Times a Day As Needed for Muscle Spasms. 60 tablet 5 7/30/2021 at Unknown time   • DULoxetine (CYMBALTA) 20 MG capsule TAKE 1 CAPSULE BY MOUTH DAILY. 30 capsule 2 7/30/2021 at Unknown time   • ferrous sulfate (FeroSul) 325 (65 FE) MG tablet Take 1 tablet by mouth Daily With Breakfast. 30 tablet 3 7/30/2021 at Unknown time   • furosemide (LASIX) 20 MG tablet TAKE 1 TABLET BY MOUTH DAILY. 30 tablet 0 7/30/2021 at Unknown time   • gabapentin (NEURONTIN) 800 MG tablet Take 1 tablet by mouth 3 (Three) Times a Day. 90 tablet 0 7/30/2021 at Unknown time   • insulin aspart (novoLOG FLEXPEN) 100 UNIT/ML solution pen-injector sc pen Inject 20 Units under the skin into the appropriate area as directed 3 (Three) Times a Day With Meals. 15 mL 1 7/30/2021 at Unknown time   • Insulin Pen Needle (NovoFine) 30G X 8 MM misc As directed 4 times daily 100 each 11 7/30/2021 at Unknown time   • ipratropium-albuterol (DUO-NEB) 0.5-2.5 mg/3 ml nebulizer Take 3 mL by nebulization 4 (Four) Times a Day.   Past Week at Unknown time   • isosorbide mononitrate (IMDUR) 30 MG 24 hr tablet Take 1 tablet by mouth Daily. 90 tablet 2 7/30/2021 at Unknown time   • lisinopril (PRINIVIL,ZESTRIL) 10 MG tablet Take 1 tablet by mouth Daily. 30 tablet 5 7/30/2021 at Unknown time   • montelukast (SINGULAIR) 10 MG tablet Take 1 tablet by mouth Every Night. 30 tablet 5 7/30/2021 at Unknown time   • nystatin (MYCOSTATIN) 630619 UNIT/GM powder Apply  topically to the appropriate area as directed 3  (Three) Times a Day. 15 g 1 Past Week at Unknown time   • ondansetron ODT (Zofran ODT) 4 MG disintegrating tablet Place 1 tablet on the tongue Every 8 (Eight) Hours As Needed for Nausea or Vomiting. 30 tablet 0 Past Month at Unknown time   • oxybutynin XL (DITROPAN-XL) 5 MG 24 hr tablet Take 1 tablet by mouth Daily. 90 tablet 1 7/30/2021 at Unknown time   • pantoprazole (PROTONIX) 40 MG EC tablet Take 1 tablet by mouth Every Night. 30 tablet 5 7/30/2021 at Unknown time   • sodium bicarbonate 650 MG tablet Take 1 tablet by mouth 2 (Two) Times a Day. 60 tablet 1 7/30/2021 at Unknown time   • Blood Glucose Monitoring Suppl ([employer] Blood Glucose Meter) w/Device kit 1 each 3 (Three) Times a Day. 1 kit 3    • Continuous Blood Gluc  (FreeStyle Jade 2 [place]richs) device 1 each Continuous. 1 each 0    • Continuous Blood Gluc Sensor (FreeStyle Jade 2 Sensor) misc 1 each Every 14 (Fourteen) Days. 2 each 11    • EASY TOUCH PEN NEEDLES 31G X 8 MM misc       • glucose blood test strip Use as instructed 100 each 12    • insulin detemir (LEVEMIR) 100 UNIT/ML injection Inject 45 Units under the skin into the appropriate area as directed Every 12 (Twelve) Hours for 30 days. 27 mL 11    • lidocaine (LIDODERM) 5 % PLACE 1 PATCH ON THE SKIN AS DIRECTED BY PROVIDER DAILY. REMOVE & DISCARD PATCH WITHIN 12 HOURS OR AS DIRECTED BY MD 30 patch 3 Unknown at Unknown time   • metoprolol tartrate (LOPRESSOR) 100 MG tablet Take 1 tablet by mouth Every 12 (Twelve) Hours for 30 days. (Patient taking differently: Take 100 mg by mouth 2 (Two) Times a Day.) 60 tablet 0    • nitroglycerin (NITROSTAT) 0.4 MG SL tablet Place 0.4 mg under the tongue Every 5 (Five) Minutes As Needed for Chest Pain (x 3 doses).   Unknown at Unknown time   , Scheduled Meds:  [START ON 8/7/2021] Pharmacy to dose vancomycin, , Does not apply, Once  aspirin, 81 mg, Oral, Daily  atorvastatin, 40 mg, Oral, Daily  bumetanide, 2 mg, Intravenous, Daily  cefepime, 2 g,  Intravenous, Q12H  cholecalciferol, 50,000 Units, Oral, Weekly  clopidogrel, 75 mg, Oral, Daily  docosanol, , Topical, 5x Daily  DULoxetine, 20 mg, Oral, Daily  ferric gluconate (FERRLECIT) IVPB, 125 mg, Intravenous, Daily  gabapentin, 800 mg, Oral, TID  heparin (porcine), 5,000 Units, Subcutaneous, Q12H  insulin aspart, 0-9 Units, Subcutaneous, TID AC  insulin detemir, 50 Units, Subcutaneous, Q12H  isosorbide mononitrate, 30 mg, Oral, Daily  metoprolol tartrate, 100 mg, Oral, Q12H  metroNIDAZOLE, 500 mg, Intravenous, Q8H  oxybutynin XL, 5 mg, Oral, Daily  sodium bicarbonate, 1,300 mg, Oral, TID  sodium chloride, 10 mL, Intravenous, Q12H  vancomycin, 750 mg, Intravenous, Q24H    , Continuous Infusions:  Pharmacy to Dose Cefepime,   Pharmacy to Dose Cefepime,   Pharmacy to dose vancomycin,     , PRN Meds:  acetaminophen  •  dextrose  •  dextrose  •  glucagon (human recombinant)  •  HYDROcodone-acetaminophen  •  HYDROmorphone  •  magnesium sulfate **OR** magnesium sulfate in D5W 1g/100mL (PREMIX) **OR** magnesium sulfate  •  Morphine  •  nystatin  •  Pharmacy to Dose Cefepime  •  Pharmacy to Dose Cefepime  •  Pharmacy to dose vancomycin  •  potassium chloride **OR** potassium chloride **OR** potassium chloride  •  [COMPLETED] Insert peripheral IV **AND** sodium chloride  •  sodium chloride and Allergies:  Adhesive tape and Other    Objective     Vital Signs   Temp:  [96.8 °F (36 °C)-98.1 °F (36.7 °C)] 97.6 °F (36.4 °C)  Heart Rate:  [62-73] 67  Resp:  [18-20] 18  BP: (103-176)/(55-94) 103/57    Physical Exam:       General Appearance:    Alert, cooperative, in no acute distress   Head:    Normocephalic, without obvious abnormality, atraumatic   Eyes:            Lids and lashes normal, conjunctivae and sclerae normal, no   icterus, no pallor, corneas clear   Ears:    Ears appear intact with no abnormalities noted   Lungs:     Breathing unlabored   Extremities:  minimal erythema patches on left calf, thickened skin vs  induration near knee. No fluctuance, no necrotic tissue present   Neurologic:   Cranial nerves 2 - 12 grossly intact, sensation intact       Results Review:  Lab Results (last 72 hours)     Procedure Component Value Units Date/Time    C-reactive Protein [[id number]]  (Abnormal) Collected: 08/05/21 0523    Specimen: Blood Updated: 08/05/21 1056     C-Reactive Protein 9.59 mg/dL     POC Glucose Once [[id number]]  (Abnormal) Collected: 08/05/21 1035    Specimen: Blood Updated: 08/05/21 1051     Glucose 317 mg/dL      Comment: RN NotifiedOperator: [id number] GUEVARA CRYSTALMeter ID: [id number]       POC Glucose Once [[id number]]  (Abnormal) Collected: 08/05/21 0551    Specimen: Blood Updated: 08/05/21 0614     Glucose 231 mg/dL      Comment: RN NotifiedOperator: [id number] IKE Enciso ID: [id number]       Basic Metabolic Panel [[id number]]  (Abnormal) Collected: 08/05/21 0523    Specimen: Blood Updated: 08/05/21 0602     Glucose 210 mg/dL      BUN 34 mg/dL      Creatinine 1.99 mg/dL      Sodium 133 mmol/L      Potassium 4.6 mmol/L      Chloride 102 mmol/L      CO2 22.0 mmol/L      Calcium 9.3 mg/dL      eGFR Non African Amer 25 mL/min/1.73      BUN/Creatinine Ratio 17.1     Anion Gap 9.0 mmol/L     Narrative:      GFR Normal >60  Chronic Kidney Disease <60  Kidney Failure <15      CBC & Differential [[id number]]  (Abnormal) Collected: 08/05/21 0523    Specimen: Blood Updated: 08/05/21 0555    Narrative:      The following orders were created for panel order CBC & Differential.  Procedure                               Abnormality         Status                     ---------                               -----------         ------                     CBC Auto Differential[[id number]]        Abnormal            Final result                 Please view results for these tests on the individual orders.    CBC Auto Differential [[id number]]  (Abnormal) Collected: 08/05/21 0523    Specimen: Blood Updated: 08/05/21 0555      WBC 7.89 10*3/mm3      RBC 2.67 10*6/mm3      Hemoglobin 7.2 g/dL      Hematocrit 22.9 %      MCV 85.8 fL      MCH 27.0 pg      MCHC 31.4 g/dL      RDW 14.7 %      RDW-SD 45.7 fl      MPV 8.8 fL      Platelets 288 10*3/mm3      Neutrophil % 53.1 %      Lymphocyte % 35.0 %      Monocyte % 6.5 %      Eosinophil % 3.0 %      Basophil % 1.1 %      Immature Grans % 1.3 %      Neutrophils, Absolute 4.19 10*3/mm3      Lymphocytes, Absolute 2.76 10*3/mm3      Monocytes, Absolute 0.51 10*3/mm3      Eosinophils, Absolute 0.24 10*3/mm3      Basophils, Absolute 0.09 10*3/mm3      Immature Grans, Absolute 0.10 10*3/mm3      nRBC 0.0 /100 WBC     Blood Culture - Blood, Wrist, Right [[id number]] Collected: 07/30/21 2008    Specimen: Blood from Wrist, Right Updated: 08/04/21 2045     Blood Culture No growth at 5 days    Blood Culture - Blood, Arm, Left [[id number]] Collected: 07/30/21 2011    Specimen: Blood from Arm, Left Updated: 08/04/21 2045     Blood Culture No growth at 5 days    Narrative:      Plated and returned to blood culture incubator at 2200    POC Glucose Once [[id number]]  (Abnormal) Collected: 08/04/21 1928    Specimen: Blood Updated: 08/04/21 2036     Glucose 221 mg/dL      Comment: RN NotifiedOperator: [id number] BROTHERS FAITHMeter ID: [id number]       POC Glucose Once [[id number]]  (Abnormal) Collected: 08/04/21 1647    Specimen: Blood Updated: 08/04/21 1704     Glucose 219 mg/dL      Comment: RN NotifiedOperator: [id number] DARIEN AMBERMeter ID: [id number]       Vancomycin, Trough [[id number]]  (Abnormal) Collected: 08/04/21 1431    Specimen: Blood Updated: 08/04/21 1449     Vancomycin Trough 24.90 mcg/mL     POC Glucose Once [[id number]]  (Abnormal) Collected: 08/03/21 2037    Specimen: Blood Updated: 08/04/21 1416     Glucose 394 mg/dL      Comment: RN NotifiedOperator: [id number] [place] ELRITAMeter ID: [id number]       POC Glucose Once [[id number]]  (Abnormal) Collected: 08/03/21 2036    Specimen: Blood  Updated: 08/04/21 1416     Glucose 416 mg/dL      Comment: RN NotifiedOperator: [id number] HADLEY ELRITAMeter ID: [id number]       POC Glucose Once [[id number]]  (Abnormal) Collected: 08/04/21 1042    Specimen: Blood Updated: 08/04/21 1055     Glucose 249 mg/dL      Comment: RN NotifiedOperator: [id number] GUEVARA CRYSTALMeter ID: [id number]       POC Glucose Once [[id number]]  (Abnormal) Collected: 08/04/21 0623    Specimen: Blood Updated: 08/04/21 0720     Glucose 233 mg/dL      Comment: Result Not ConfirmedOperator: [id number] HADLEY ELRITAMeter ID: [id number]       Basic Metabolic Panel [[id number]]  (Abnormal) Collected: 08/04/21 0526    Specimen: Blood Updated: 08/04/21 0610     Glucose 226 mg/dL      BUN 36 mg/dL      Creatinine 2.14 mg/dL      Sodium 130 mmol/L      Potassium 4.6 mmol/L      Chloride 100 mmol/L      CO2 20.0 mmol/L      Calcium 8.6 mg/dL      eGFR Non African Amer 23 mL/min/1.73      BUN/Creatinine Ratio 16.8     Anion Gap 10.0 mmol/L     Narrative:      GFR Normal >60  Chronic Kidney Disease <60  Kidney Failure <15      C-reactive Protein [[id number]]  (Abnormal) Collected: 08/04/21 0526    Specimen: Blood Updated: 08/04/21 0610     C-Reactive Protein 11.37 mg/dL     CBC & Differential [[id number]]  (Abnormal) Collected: 08/04/21 0526    Specimen: Blood Updated: 08/04/21 0606    Narrative:      The following orders were created for panel order CBC & Differential.  Procedure                               Abnormality         Status                     ---------                               -----------         ------                     CBC Auto Differential[[id number]]        Abnormal            Final result                 Please view results for these tests on the individual orders.    CBC Auto Differential [[id number]]  (Abnormal) Collected: 08/04/21 0526    Specimen: Blood Updated: 08/04/21 0606     WBC 8.78 10*3/mm3      RBC 2.60 10*6/mm3      Hemoglobin 7.2 g/dL      Hematocrit 22.8 %       MCV 87.7 fL      MCH 27.7 pg      MCHC 31.6 g/dL      RDW 14.8 %      RDW-SD 47.5 fl      MPV 8.8 fL      Platelets 267 10*3/mm3      Neutrophil % 61.8 %      Lymphocyte % 26.2 %      Monocyte % 6.5 %      Eosinophil % 3.8 %      Basophil % 0.7 %      Immature Grans % 1.0 %      Neutrophils, Absolute 5.43 10*3/mm3      Lymphocytes, Absolute 2.30 10*3/mm3      Monocytes, Absolute 0.57 10*3/mm3      Eosinophils, Absolute 0.33 10*3/mm3      Basophils, Absolute 0.06 10*3/mm3      Immature Grans, Absolute 0.09 10*3/mm3      nRBC 0.0 /100 WBC     Urinalysis With Microscopic - Urine, Clean Catch [[id number]]  (Abnormal) Collected: 08/02/21 1626    Specimen: Urine, Clean Catch Updated: 08/03/21 2054    Narrative:      The following orders were created for panel order Urinalysis With Microscopic - Urine, Clean Catch.  Procedure                               Abnormality         Status                     ---------                               -----------         ------                     Urinalysis without micro...[[id number]]  Abnormal            Final result               Urinalysis, Microscopic ...[[id number]]  Abnormal            Final result                 Please view results for these tests on the individual orders.    Urinalysis, Microscopic Only - Urine, Clean Catch [[id number]]  (Abnormal) Collected: 08/02/21 1626    Specimen: Urine, Clean Catch Updated: 08/03/21 2054     RBC, UA None Seen /HPF      WBC, UA 3-5 /HPF      Bacteria, UA 1+ /HPF      Squamous Epithelial Cells, UA 3-5 /HPF      Yeast, UA Large/3+ Budding Yeast /HPF      Hyaline Casts, UA None Seen /LPF      Methodology Manual Light Microscopy    Urinalysis without microscopic (no culture) - Urine, Clean Catch [[id number]]  (Abnormal) Collected: 08/02/21 1626    Specimen: Urine, Clean Catch Updated: 08/03/21 2039     Color, UA Yellow     Appearance, UA Clear     pH, UA 6.0     Specific Gravity, UA 1.014     Glucose, UA >=1000 mg/dL (3+)     Ketones, UA  Negative     Bilirubin, UA Negative     Blood, UA Negative     Protein,  mg/dL (2+)     Leuk Esterase, UA Negative     Nitrite, UA Negative     Urobilinogen, UA 0.2 E.U./dL    POC Glucose Once [[id number]]  (Abnormal) Collected: 08/03/21 1550    Specimen: Blood Updated: 08/03/21 1616     Glucose 346 mg/dL      Comment: RN NotifiedOperator: [id number] MERLE MAKAYLAMeter ID: [id number]       Ferritin [[id number]]  (Normal) Collected: 08/03/21 0523    Specimen: Blood Updated: 08/03/21 1232     Ferritin 144.50 ng/mL     Narrative:      Results may be falsely decreased if patient taking Biotin.      Iron Profile [[id number]]  (Abnormal) Collected: 08/03/21 0523    Specimen: Blood Updated: 08/03/21 1225     Iron 28 mcg/dL      Iron Saturation 12 %      Transferrin 153 mg/dL      TIBC 228 mcg/dL     POC Glucose Once [[id number]]  (Abnormal) Collected: 08/03/21 1108    Specimen: Blood Updated: 08/03/21 1129     Glucose 301 mg/dL      Comment: RN NotifiedOperator: [id number] MERLE MAKAYLAMeter ID: [id number]       POC Glucose Once [[id number]]  (Abnormal) Collected: 08/03/21 0605    Specimen: Blood Updated: 08/03/21 0653     Glucose 239 mg/dL      Comment: RN NotifiedOperator: [id number] JUDD Cruzer ID: [id number]       Basic Metabolic Panel [[id number]]  (Abnormal) Collected: 08/03/21 0523    Specimen: Blood Updated: 08/03/21 0637     Glucose 228 mg/dL      BUN 41 mg/dL      Creatinine 2.58 mg/dL      Sodium 134 mmol/L      Potassium 4.8 mmol/L      Chloride 103 mmol/L      CO2 21.0 mmol/L      Calcium 8.7 mg/dL      eGFR Non African Amer 19 mL/min/1.73      BUN/Creatinine Ratio 15.9     Anion Gap 10.0 mmol/L     Narrative:      GFR Normal >60  Chronic Kidney Disease <60  Kidney Failure <15      C-reactive Protein [[id number]]  (Abnormal) Collected: 08/03/21 0523    Specimen: Blood Updated: 08/03/21 0637     C-Reactive Protein 13.29 mg/dL     CBC & Differential [[id number]]  (Abnormal) Collected: 08/03/21 0523     Specimen: Blood Updated: 08/03/21 0622    Narrative:      The following orders were created for panel order CBC & Differential.  Procedure                               Abnormality         Status                     ---------                               -----------         ------                     CBC Auto Differential[[id number]]        Abnormal            Final result                 Please view results for these tests on the individual orders.    CBC Auto Differential [[id number]]  (Abnormal) Collected: 08/03/21 0523    Specimen: Blood Updated: 08/03/21 0622     WBC 9.71 10*3/mm3      RBC 2.74 10*6/mm3      Hemoglobin 7.6 g/dL      Hematocrit 24.5 %      MCV 89.4 fL      MCH 27.7 pg      MCHC 31.0 g/dL      RDW 15.1 %      RDW-SD 48.6 fl      MPV 8.8 fL      Platelets 260 10*3/mm3      Neutrophil % 64.1 %      Lymphocyte % 24.0 %      Monocyte % 6.3 %      Eosinophil % 4.1 %      Basophil % 0.6 %      Immature Grans % 0.9 %      Neutrophils, Absolute 6.22 10*3/mm3      Lymphocytes, Absolute 2.33 10*3/mm3      Monocytes, Absolute 0.61 10*3/mm3      Eosinophils, Absolute 0.40 10*3/mm3      Basophils, Absolute 0.06 10*3/mm3      Immature Grans, Absolute 0.09 10*3/mm3      nRBC 0.0 /100 WBC     POC Glucose Once [[id number]]  (Abnormal) Collected: 08/02/21 2033    Specimen: Blood Updated: 08/02/21 2109     Glucose 334 mg/dL      Comment: RN NotifiedOperator: [id number] JUDD [facility] ID: [id number]       Creatinine, Urine, Random - Urine, Clean Catch [[id number]] Collected: 08/02/21 1441    Specimen: Urine, Clean Catch Updated: 08/02/21 1938     Creatinine, Urine 28.3 mg/dL     Narrative:      Reference intervals for random urine have not been established.  Clinical usage is dependent upon physician's interpretation in combination with other laboratory tests.           Imaging Results (Last 72 Hours)     Procedure Component Value Units Date/Time     Nonvascular Extremity Limited [[id number]] Collected:  08/04/21 1106     Updated: 08/04/21 1202    Narrative:      EXAM: US EXTREMITY MUSCULOSKELETAL LIMITED    INDICATION: Worsening cellulitic changes.    COMPARISONS: Ultrasound 7/30/2021    TECHNIQUE: Grayscale and color Doppler sonographic images were  obtained at the area of interest.    FINDINGS:  At the area of interest in the left leg, there is extensive  subcutaneous edema of the anterior knee and leg. No focal fluid  collection to suggest abscess is visualized.      Impression:      Left leg subcutaneous edema without sonographic evidence of  abscess.    Electronically signed by:  Kamar Khanna MD  8/4/2021  12:01 PM CDT Workstation: [id number]          I reviewed the patient's new clinical results.  I reviewed the patient's new imaging results and agree with the interpretation.  I reviewed the patient's other test results and agree with the interpretation      Assessment/Plan       Cellulitis of left leg      61-year-old lady with multiple medical comorbidities and obesity with resolving left lower extremity cellulitis    On exam today she has no areas of fluctuance or tissue necrosis.  She has minimal discomfort in her calf on exam.  She has a few areas of firm indurated skin without significant erythema.  Some of this may be related to chronic changes as well.  Currently I do not believe that she requires any surgical intervention.  Certainly elevation of the extremity as well as a light compression device may also help.  Otherwise continue antibiotics per primary team.    I discussed the patient's findings and my recommendations with patient, nursing staff and primary care team              This document has been electronically signed by Huy Ramsey MD on August 5, 2021 13:11 CDT      Huy Ramsey MD  08/05/21  13:11 CDT

## 2021-08-05 NOTE — PLAN OF CARE
Goal Outcome Evaluation:  Plan of Care Reviewed With: patient, caregiver        Progress: no change  Outcome Summary: Pt visited for LOS.  Good po intake.  Still being managed for Cellulitis.  Will monitor

## 2021-08-06 LAB
ANION GAP SERPL CALCULATED.3IONS-SCNC: 10 MMOL/L (ref 5–15)
BASOPHILS # BLD AUTO: 0.07 10*3/MM3 (ref 0–0.2)
BASOPHILS NFR BLD AUTO: 0.8 % (ref 0–1.5)
BUN SERPL-MCNC: 39 MG/DL (ref 8–23)
BUN/CREAT SERPL: 19.8 (ref 7–25)
CALCIUM SPEC-SCNC: 9 MG/DL (ref 8.6–10.5)
CHLORIDE SERPL-SCNC: 99 MMOL/L (ref 98–107)
CO2 SERPL-SCNC: 23 MMOL/L (ref 22–29)
CREAT SERPL-MCNC: 1.97 MG/DL (ref 0.57–1)
CRP SERPL-MCNC: 7.13 MG/DL (ref 0–0.5)
DEPRECATED RDW RBC AUTO: 46 FL (ref 37–54)
EOSINOPHIL # BLD AUTO: 0.23 10*3/MM3 (ref 0–0.4)
EOSINOPHIL NFR BLD AUTO: 2.7 % (ref 0.3–6.2)
ERYTHROCYTE [DISTWIDTH] IN BLOOD BY AUTOMATED COUNT: 14.8 % (ref 12.3–15.4)
GFR SERPL CREATININE-BSD FRML MDRD: 26 ML/MIN/1.73
GLUCOSE BLDC GLUCOMTR-MCNC: 224 MG/DL (ref 70–130)
GLUCOSE BLDC GLUCOMTR-MCNC: 233 MG/DL (ref 70–130)
GLUCOSE BLDC GLUCOMTR-MCNC: 275 MG/DL (ref 70–130)
GLUCOSE BLDC GLUCOMTR-MCNC: 334 MG/DL (ref 70–130)
GLUCOSE SERPL-MCNC: 278 MG/DL (ref 65–99)
HCT VFR BLD AUTO: 23.5 % (ref 34–46.6)
HGB BLD-MCNC: 7.3 G/DL (ref 12–15.9)
IMM GRANULOCYTES # BLD AUTO: 0.15 10*3/MM3 (ref 0–0.05)
IMM GRANULOCYTES NFR BLD AUTO: 1.8 % (ref 0–0.5)
LYMPHOCYTES # BLD AUTO: 2.47 10*3/MM3 (ref 0.7–3.1)
LYMPHOCYTES NFR BLD AUTO: 29.2 % (ref 19.6–45.3)
MCH RBC QN AUTO: 26.8 PG (ref 26.6–33)
MCHC RBC AUTO-ENTMCNC: 31.1 G/DL (ref 31.5–35.7)
MCV RBC AUTO: 86.4 FL (ref 79–97)
MONOCYTES # BLD AUTO: 0.62 10*3/MM3 (ref 0.1–0.9)
MONOCYTES NFR BLD AUTO: 7.3 % (ref 5–12)
NEUTROPHILS NFR BLD AUTO: 4.92 10*3/MM3 (ref 1.7–7)
NEUTROPHILS NFR BLD AUTO: 58.2 % (ref 42.7–76)
NRBC BLD AUTO-RTO: 0 /100 WBC (ref 0–0.2)
PLATELET # BLD AUTO: 311 10*3/MM3 (ref 140–450)
PMV BLD AUTO: 8.8 FL (ref 6–12)
POTASSIUM SERPL-SCNC: 4.6 MMOL/L (ref 3.5–5.2)
RBC # BLD AUTO: 2.72 10*6/MM3 (ref 3.77–5.28)
SODIUM SERPL-SCNC: 132 MMOL/L (ref 136–145)
WBC # BLD AUTO: 8.46 10*3/MM3 (ref 3.4–10.8)

## 2021-08-06 PROCEDURE — 82962 GLUCOSE BLOOD TEST: CPT

## 2021-08-06 PROCEDURE — 97535 SELF CARE MNGMENT TRAINING: CPT

## 2021-08-06 PROCEDURE — 25010000002 NA FERRIC GLUC CPLX PER 12.5 MG: Performed by: INTERNAL MEDICINE

## 2021-08-06 PROCEDURE — 63710000001 INSULIN ASPART PER 5 UNITS: Performed by: FAMILY MEDICINE

## 2021-08-06 PROCEDURE — 63710000001 INSULIN DETEMIR PER 5 UNITS: Performed by: FAMILY MEDICINE

## 2021-08-06 PROCEDURE — 25010000002 HYDROMORPHONE 1 MG/ML SOLUTION: Performed by: INTERNAL MEDICINE

## 2021-08-06 PROCEDURE — 25010000002 VANCOMYCIN 5 G RECONSTITUTED SOLUTION: Performed by: FAMILY MEDICINE

## 2021-08-06 PROCEDURE — 25010000002 HEPARIN (PORCINE) PER 1000 UNITS: Performed by: INTERNAL MEDICINE

## 2021-08-06 PROCEDURE — 25010000002 CEFEPIME 2 G/NS 100 ML SOLUTION: Performed by: FAMILY MEDICINE

## 2021-08-06 PROCEDURE — 63710000001 INSULIN ASPART PER 5 UNITS: Performed by: INTERNAL MEDICINE

## 2021-08-06 PROCEDURE — 85025 COMPLETE CBC W/AUTO DIFF WBC: CPT | Performed by: FAMILY MEDICINE

## 2021-08-06 PROCEDURE — 25010000002 CEFEPIME PER 500 MG: Performed by: FAMILY MEDICINE

## 2021-08-06 PROCEDURE — 80048 BASIC METABOLIC PNL TOTAL CA: CPT | Performed by: FAMILY MEDICINE

## 2021-08-06 PROCEDURE — 86140 C-REACTIVE PROTEIN: CPT | Performed by: FAMILY MEDICINE

## 2021-08-06 RX ADMIN — SODIUM CHLORIDE, PRESERVATIVE FREE 10 ML: 5 INJECTION INTRAVENOUS at 10:47

## 2021-08-06 RX ADMIN — ASPIRIN 81 MG: 81 TABLET, FILM COATED ORAL at 08:24

## 2021-08-06 RX ADMIN — HEPARIN SODIUM 5000 UNITS: 5000 INJECTION INTRAVENOUS; SUBCUTANEOUS at 08:24

## 2021-08-06 RX ADMIN — CLOPIDOGREL BISULFATE 75 MG: 75 TABLET ORAL at 08:24

## 2021-08-06 RX ADMIN — SODIUM BICARBONATE 1300 MG: 650 TABLET ORAL at 15:07

## 2021-08-06 RX ADMIN — METRONIDAZOLE 500 MG: 500 INJECTION, SOLUTION INTRAVENOUS at 18:00

## 2021-08-06 RX ADMIN — DULOXETINE HYDROCHLORIDE 20 MG: 20 CAPSULE, DELAYED RELEASE ORAL at 08:24

## 2021-08-06 RX ADMIN — METOPROLOL TARTRATE 100 MG: 50 TABLET, FILM COATED ORAL at 08:24

## 2021-08-06 RX ADMIN — HEPARIN SODIUM 5000 UNITS: 5000 INJECTION INTRAVENOUS; SUBCUTANEOUS at 20:37

## 2021-08-06 RX ADMIN — SODIUM CHLORIDE 125 MG: 9 INJECTION, SOLUTION INTRAVENOUS at 09:27

## 2021-08-06 RX ADMIN — GABAPENTIN 800 MG: 400 CAPSULE ORAL at 15:07

## 2021-08-06 RX ADMIN — VANCOMYCIN HYDROCHLORIDE 750 MG: 5 INJECTION, POWDER, LYOPHILIZED, FOR SOLUTION INTRAVENOUS at 20:37

## 2021-08-06 RX ADMIN — DOCOSANOL: 100 CREAM TOPICAL at 17:55

## 2021-08-06 RX ADMIN — GABAPENTIN 800 MG: 400 CAPSULE ORAL at 08:24

## 2021-08-06 RX ADMIN — INSULIN ASPART 8 UNITS: 100 INJECTION, SOLUTION INTRAVENOUS; SUBCUTANEOUS at 11:34

## 2021-08-06 RX ADMIN — BUMETANIDE 2 MG: 0.25 INJECTION INTRAMUSCULAR; INTRAVENOUS at 08:24

## 2021-08-06 RX ADMIN — ISOSORBIDE MONONITRATE 30 MG: 30 TABLET, EXTENDED RELEASE ORAL at 08:24

## 2021-08-06 RX ADMIN — SODIUM BICARBONATE 1300 MG: 650 TABLET ORAL at 08:24

## 2021-08-06 RX ADMIN — SODIUM BICARBONATE 1300 MG: 650 TABLET ORAL at 20:37

## 2021-08-06 RX ADMIN — GABAPENTIN 800 MG: 400 CAPSULE ORAL at 20:37

## 2021-08-06 RX ADMIN — INSULIN ASPART 16 UNITS: 100 INJECTION, SOLUTION INTRAVENOUS; SUBCUTANEOUS at 08:25

## 2021-08-06 RX ADMIN — DOCOSANOL: 100 CREAM TOPICAL at 22:45

## 2021-08-06 RX ADMIN — METOPROLOL TARTRATE 100 MG: 50 TABLET, FILM COATED ORAL at 20:37

## 2021-08-06 RX ADMIN — DOCOSANOL: 100 CREAM TOPICAL at 10:47

## 2021-08-06 RX ADMIN — HYDROMORPHONE HYDROCHLORIDE 0.5 MG: 1 INJECTION, SOLUTION INTRAMUSCULAR; INTRAVENOUS; SUBCUTANEOUS at 19:15

## 2021-08-06 RX ADMIN — OXYBUTYNIN CHLORIDE 5 MG: 5 TABLET, EXTENDED RELEASE ORAL at 08:24

## 2021-08-06 RX ADMIN — METRONIDAZOLE 500 MG: 500 INJECTION, SOLUTION INTRAVENOUS at 03:07

## 2021-08-06 RX ADMIN — DOCOSANOL: 100 CREAM TOPICAL at 13:55

## 2021-08-06 RX ADMIN — INSULIN ASPART 8 UNITS: 100 INJECTION, SOLUTION INTRAVENOUS; SUBCUTANEOUS at 17:56

## 2021-08-06 RX ADMIN — HYDROCODONE BITARTRATE AND ACETAMINOPHEN 1 TABLET: 5; 325 TABLET ORAL at 09:27

## 2021-08-06 RX ADMIN — HYDROMORPHONE HYDROCHLORIDE 0.5 MG: 1 INJECTION, SOLUTION INTRAMUSCULAR; INTRAVENOUS; SUBCUTANEOUS at 06:02

## 2021-08-06 RX ADMIN — INSULIN DETEMIR 50 UNITS: 100 INJECTION, SOLUTION SUBCUTANEOUS at 22:40

## 2021-08-06 RX ADMIN — CEFEPIME HYDROCHLORIDE 2 G: 2 INJECTION, POWDER, FOR SOLUTION INTRAVENOUS at 10:47

## 2021-08-06 RX ADMIN — ATORVASTATIN CALCIUM 40 MG: 40 TABLET, FILM COATED ORAL at 08:24

## 2021-08-06 RX ADMIN — CEFEPIME HYDROCHLORIDE 2 G: 2 INJECTION, POWDER, FOR SOLUTION INTRAVENOUS at 22:43

## 2021-08-06 RX ADMIN — METRONIDAZOLE 500 MG: 500 INJECTION, SOLUTION INTRAVENOUS at 10:46

## 2021-08-06 RX ADMIN — SODIUM CHLORIDE, PRESERVATIVE FREE 10 ML: 5 INJECTION INTRAVENOUS at 20:42

## 2021-08-06 RX ADMIN — INSULIN DETEMIR 50 UNITS: 100 INJECTION, SOLUTION SUBCUTANEOUS at 08:24

## 2021-08-06 RX ADMIN — DOCOSANOL 1 APPLICATION: 100 CREAM TOPICAL at 06:02

## 2021-08-06 NOTE — THERAPY TREATMENT NOTE
Patient Name: Yamileth Slater  : 1960    MRN: [id number]                              Today's Date: 2021       Admit Date: 2021    Visit Dx:     ICD-10-CM ICD-9-CM   1. Cellulitis of left leg  L03.116 682.6   2. Anemia, unspecified type  D64.9 285.9   3. Chronic kidney disease, unspecified CKD stage  N18.9 585.9   4. Type 2 diabetes mellitus with hyperglycemia, unspecified whether long term insulin use (CMS/[facility])  E11.65 250.00   5. Impaired mobility and ADLs  Z74.09 V49.89    Z78.9      Patient Active Problem List   Diagnosis   • Type 2 diabetes mellitus with diabetic polyneuropathy, with long-term current use of insulin (CMS/[facility])   • Chronic obstructive pulmonary disease (CMS/[facility])   • Chronic midline low back pain without sciatica   • Vitamin D deficiency   • Type 2 diabetes mellitus (CMS/[facility])   • Tobacco dependence syndrome   • Surgical follow-up care   • Shoulder joint pain   • Peripheral vascular disease (CMS/[facility])   • Pain   • Neurologic disorder associated with diabetes mellitus (CMS/[facility])   • Morbid obesity with BMI of 50.0-59.9, adult (CMS/[facility])   • Mixed hyperlipidemia   • Kidney stone   • History of colon polyps   • GERD (gastroesophageal reflux disease)   • Excoriated eczema   • Essential hypertension   • Encounter for medication refill   • Dyslipidemia   • Diabetes mellitus (CMS/[facility])   • Coronary artery disease   • Chronic obstructive lung disease (CMS/[facility])   • Chronic folliculitis   • Chest pain   • Mild persistent asthma   • 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   • Uncontrolled type 2 diabetes mellitus with complication, with long-term current use of insulin (CMS/[facility])   • Anemia   • Hypothyroidism   • Carotid artery disease (CMS/[facility])   • Current smoker   • DM type 2 with diabetic peripheral neuropathy (CMS/[facility])   • [place] abuse   • PAD (peripheral artery disease) (CMS/[facility])   • S/P CABG x 3   • Intercostal pain   • Venous insufficiency   • Dyspnea on exertion   •  LAFB (left anterior fascicular block)   • Pulmonary hypertension (CMS/[facility])   • Left hip pain   • Neck pain   • Acute kidney injury superimposed on chronic kidney disease (CMS/[facility])   • MIKE and COPD overlap syndrome (CMS/[facility])   • Pneumonia due to COVID-19 virus   • CKD (chronic kidney disease) stage 4, GFR 15-29 ml/min (CMS/[facility])   • Morbid obesity (CMS/[facility])   • Type 2 diabetes mellitus with hyperglycemia, with long-term current use of insulin (CMS/[facility])   • Hyponatremia   • Hyperkalemia   • Anemia   • Cutaneous candidiasis   • Community acquired pneumonia of right middle lobe of lung   • MRSA infection   • Alkaline phosphatase elevation   • COVID-19 ruled out by laboratory testing   • Esophageal dysphagia   • Monilial esophagitis (CMS/[facility])   • NSTEMI, initial episode of care (CMS/[facility])   • CAD (coronary artery disease)   • Gastrointestinal hemorrhage   • Chronic respiratory failure with hypoxia (CMS/[facility])   • Pneumonia due to infectious organism   • Chronic hypercapnic respiratory failure (CMS/[facility])   • Cellulitis of left leg     Past Medical History:   Diagnosis Date   • Anxiety    • Carotid artery stenosis    • Chronic obstructive lung disease (CMS/[facility])    • CKD (chronic kidney disease) stage 4, GFR 15-29 ml/min (CMS/[facility])    • Colonic polyp    • Coronary arteriosclerosis    • Diabetes mellitus (CMS/[facility])    • Diabetic neuropathy (CMS/[facility])    • GERD (gastroesophageal reflux disease)    • History of transfusion    • Hypercholesterolemia    • Hypertension    • Hypomagnesemia 6/27/2021    Monitor and replace   • Morbid obesity (CMS/[facility])    • Nephrolithiasis    • Peripheral vascular disease (CMS/[facility])    • Sleep apnea    • Substance abuse (CMS/[facility])    • Vitamin D deficiency      Past Surgical History:   Procedure Laterality Date   • CARDIAC CATHETERIZATION N/A 7/14/2020   • CARDIAC CATHETERIZATION N/A 4/23/2021    Procedure: Left Heart Cath;  Surgeon: Melba Romo MD;  Location: [facility] CATH [facility]VASIVE LOC[facility];  Service:  Cardiology;  Laterality: N/A;   • CARDIAC CATHETERIZATION N/A 4/30/2021    Procedure: Percutaneous Coronary Intervention;  Surgeon: Russell Voss MD;  Location: [facility] CATH INVASIVE LOCATION;  Service: Cardiovascular;  Laterality: N/A;   • CARDIAC CATHETERIZATION N/A 4/30/2021    Procedure: Stent NIKKI coronary;  Surgeon: Russell Voss MD;  Location: [facility] CATH INVASIVE LOCATION;  Service: Cardiovascular;  Laterality: N/A;   • CAROTID STENT Left    • COLONOSCOPY     • COLONOSCOPY N/A 5/14/2021    Procedure: COLONOSCOPY;  Surgeon: Mingo Duarte MD;  Location: [facility] ENDOSCOPY;  Service: Gastroenterology;  Laterality: N/A;   • CORONARY ARTERY BYPASS GRAFT N/A 2013    CABG X 3   • CYSTOSCOPY BLADDER STONE LITHOTRIPSY Bilateral    • ENDOSCOPY N/A 4/12/2021    Procedure: ESOPHAGOGASTRODUODENOSCOPY;  Surgeon: Mingo Duarte MD;  Location: [facility] ENDOSCOPY;  Service: Gastroenterology;  Laterality: N/A;   • ENDOSCOPY N/A 5/14/2021    Procedure: ESOPHAGOGASTRODUODENOSCOPY;  Surgeon: Mingo Duarte MD;  Location: [facility] ENDOSCOPY;  Service: Gastroenterology;  Laterality: N/A;     General Information     Row Name 08/06/21 0950          OT Time and Intention    Document Type  therapy note (daily note)  -LW     Mode of Treatment  individual therapy;occupational therapy  -     Row Name 08/06/21 0912          General Information    Patient Profile Reviewed  yes  -LW     Existing Precautions/Restrictions  fall;oxygen therapy device and L/min  -     Row Name 08/06/21 0950          Cognition    Orientation Status (Cognition)  oriented x 4  -     Row Name 08/06/21 0950          Safety Issues, Functional Mobility    Impairments Affecting Function (Mobility)  balance;endurance/activity tolerance;coordination;shortness of breath;strength  -LW       User Key  (r) = Recorded By, (t) = Taken By, (c) = Cosigned By    Initials Name Provider Type    LW Rekha Perez COTA/L Occupational Therapy Assistant           Mobility/ADL's     Row Name 08/06/21 0950          Activities of Daily Living    BADL Assessment/Intervention  grooming  -LW     Row Name 08/06/21 0950          Grooming Assessment/Training    Zap Level (Grooming)  hair care, combing/brushing;oral care regimen;wash face, hands;supervision  -LW     Position (Grooming)  long sitting  -LW       User Key  (r) = Recorded By, (t) = Taken By, (c) = Cosigned By    Initials Name Provider Type    Rekha Trotter COTA/L Occupational Therapy Assistant        Obj/Interventions    No documentation.       Goals/Plan     Row Name 08/06/21 0950          Transfer Goal 1 (OT)    Activity/Assistive Device (Transfer Goal 1, OT)  toilet  -LW     Zap Level/Cues Needed (Transfer Goal 1, OT)  supervision required  -LW     Time Frame (Transfer Goal 1, OT)  long term goal (LTG);by discharge  -LW     Progress/Outcome (Transfer Goal 1, OT)  goal not met  -[facility] Name 08/06/21 0950          Bathing Goal 1 (OT)    Activity/Device (Bathing Goal 1, OT)  upper body bathing AD as needed  -LW     Zap Level/Cues Needed (Bathing Goal 1, OT)  set-up required;supervision required  -LW     Time Frame (Bathing Goal 1, OT)  long term goal (LTG);by discharge  -LW     Progress/Outcomes (Bathing Goal 1, OT)  goal not met  -[facility] Name 08/06/21 0950          Dressing Goal 1 (OT)    Activity/Device (Dressing Goal 1, OT)  upper body dressing AD as needed  -LW     Zap/Cues Needed (Dressing Goal 1, OT)  supervision required;set-up required  -LW     Time Frame (Dressing Goal 1, OT)  long term goal (LTG);by discharge  -LW     Progress/Outcome (Dressing Goal 1, OT)  goal not met  -[facility] Name 08/06/21 0950          Toileting Goal 1 (OT)    Activity/Device (Toileting Goal 1, OT)  toileting skills, all  -LW     Zap Level/Cues Needed (Toileting Goal 1, OT)  set-up required;supervision required  -LW     Time Frame (Toileting Goal 1, OT)  long term goal  (LTG);by discharge  -LW     Progress/Outcome (Toileting Goal 1, OT)  goal not met  -LW       User Key  (r) = Recorded By, (t) = Taken By, (c) = Cosigned By    Initials Name Provider Type    Rekha Trotter COTA/L Occupational Therapy Assistant        Clinical Impression     Row Name 08/06/21 0950          Pain Scale: Numbers Pre/Post-Treatment    Pretreatment Pain Rating  8/10  -LW     Posttreatment Pain Rating  8/10  -LW     Pain Location - Side  Left  -LW     Pain Location - Orientation  lower  -LW     Pain Location  extremity  -LW     Row Name 08/06/21 0950          Plan of Care Review    Progress  improving  -LW     Outcome Summary  Pt completed grooming with Supervision. Discussed Home safety.  -LW     Row Name 08/06/21 0950          Positioning and Restraints    Pre-Treatment Position  in bed  -LW     Post Treatment Position  bed  -LW     In Bed  fowlers;call light within reach;encouraged to call for assist;exit alarm on;side rails up x2  -LW       User Key  (r) = Recorded By, (t) = Taken By, (c) = Cosigned By    Initials Name Provider Type    Rekha Trotter COTA/L Occupational Therapy Assistant        Outcome Measures     Row Name 08/06/21 0950          How much help from another is currently needed...    Putting on and taking off regular lower body clothing?  2  -LW     Bathing (including washing, rinsing, and drying)  2  -LW     Toileting (which includes using toilet bed pan or urinal)  2  -LW     Putting on and taking off regular upper body clothing  4  -LW     Taking care of personal grooming (such as brushing teeth)  4  -LW     Eating meals  4  -LW     AM-PAC 6 Clicks Score (OT)  18  -LW       User Key  (r) = Recorded By, (t) = Taken By, (c) = Cosigned By    Initials Name Provider Type    Rekha Trotter COTA/L Occupational Therapy Assistant          Occupational Therapy Education                 Title: PT OT SLP Therapies (Done)     Topic: Occupational Therapy (Done)     Point: ADL  training (Done)     Description:   Instruct learner(s) on proper safety adaptation and remediation techniques during self care or transfers.   Instruct in proper use of assistive devices.              Learning Progress Summary           Patient Acceptance, E,TB, VU by LW at 8/6/2021 1259    Acceptance, E,TB, VU,NR by  at 8/5/2021 0824    Comment: POC, role of OT, transfer training    Acceptance, E, VU by AS at 8/4/2021 1325    Comment: UE strengthening exercises    Acceptance, E,TB, VU by SC at 7/31/2021 1007                   Point: Home exercise program (Done)     Description:   Instruct learner(s) on appropriate technique for monitoring, assisting and/or progressing therapeutic exercises/activities.              Learning Progress Summary           Patient Acceptance, E,TB, VU by LW at 8/6/2021 1259    Acceptance, E, VU by AS at 8/4/2021 1325    Comment: UE strengthening exercises    Acceptance, E,TB, VU by SC at 7/31/2021 1007                   Point: Precautions (Done)     Description:   Instruct learner(s) on prescribed precautions during self-care and functional transfers.              Learning Progress Summary           Patient Acceptance, E,TB, VU by LW at 8/6/2021 1259    Acceptance, E,TB, VU,NR by  at 8/5/2021 0824    Comment: POC, role of OT, transfer training    Acceptance, E, VU by AS at 8/4/2021 1325    Comment: UE strengthening exercises    Acceptance, E, VU by ME at 8/3/2021 1508    Comment: Educated on OT and POC. Educated to call for assistance. Educated on safety precautions.    Acceptance, E,TB, VU by SC at 7/31/2021 1007                   Point: Body mechanics (Done)     Description:   Instruct learner(s) on proper positioning and spine alignment during self-care, functional mobility activities and/or exercises.              Learning Progress Summary           Patient Acceptance, E,TB, VU by LW at 8/6/2021 1259    Acceptance, E,TB, VU,NR by  at 8/5/2021 0824    Comment: POC, role of OT,  transfer training    Acceptance, E, VU by ME at 8/3/2021 1508    Comment: Educated on OT and POC. Educated to call for assistance. Educated on safety precautions.    Acceptance, E,TB, VU by SC at 7/31/2021 1007                               User Key     Initials Effective Dates Name Provider Type Discipline    LW 06/16/21 -  Rekha Perez COTA/L Occupational Therapy Assistant OT    AS 03/18/21 -  Arti Navarrete, OT Occupational Therapist OT    ME 06/16/21 -  Nikole Espino OTR/L Occupational Therapist OT     06/14/21 -  Ottoniel Robles OT Occupational Therapist OT    SC 07/21/21 -  Angela West RN Registered Nurse Nurse              OT Recommendation and Plan     Plan of Care Review  Plan of Care Reviewed With: patient  Progress: improving  Outcome Summary: Pt completed grooming with Supervision. Discussed Home safety.     Time Calculation:   Time Calculation- OT     Row Name 08/06/21 0950             Time Calculation- OT    OT Start Time  0950  -LW      OT Stop Time  1030  -LW      OT Time Calculation (min)  40 min  -LW      Total Timed Code Minutes- OT  40 minute(s)  -LW      OT Received On  08/06/21  -LW         Timed Charges    56262 - OT Self Care/Mgmt Minutes  40  -LW         Total Minutes    Timed Charges Total Minutes  40  -LW       Total Minutes  40  -LW        User Key  (r) = Recorded By, (t) = Taken By, (c) = Cosigned By    Initials Name Provider Type    LW Rekha Perez COTA/L Occupational Therapy Assistant        Therapy Charges for Today     Code Description Service Date Service Provider Modifiers Qty    30125008505 HC OT SELF CARE/MGMT/TRAIN EA 15 MIN 8/6/2021 Rekha Perez COTA/L GO 3               DARBY Cosme  8/6/2021

## 2021-08-06 NOTE — SIGNIFICANT NOTE
"   08/06/21 1257   OTHER   Discipline physical therapy assistant   Rehab Time/Intention   Session Not Performed patient/family declined treatment  (pt states she is \"trying to get some rest\")     "

## 2021-08-06 NOTE — PLAN OF CARE
Problem: Adult Inpatient Plan of Care  Goal: Plan of Care Review  Outcome: Ongoing, Progressing  Flowsheets  Taken 8/6/2021 1300  Progress: improving  Plan of Care Reviewed With: patient  Taken 8/6/2021 0950  Progress: improving  Outcome Summary: Pt completed grooming with Supervision. Discussed Home safety.   Goal Outcome Evaluation:  Plan of Care Reviewed With: patient        Progress: improving  Outcome Summary: Pt completed grooming with Supervision. Discussed Home safety.

## 2021-08-06 NOTE — PROGRESS NOTES
"[facility]  [address] [place] [address]. [address]  T - [phone]  F - [phone]     Progress Note          PATIENT  DEMOGRAPHICS   PATIENT NAME: Yamileth Slater                      PHYSICIAN: Ace Lauren MD  : 1960  MRN: [id number]   LOS: 3 days    Patient Care Team:  Rianna Macias MD as PCP - General (Family Medicine)  Subjective   SUBJECTIVE   Still has marked left leg pain, still has edema       Objective   OBJECTIVE   Vital Signs  Temp:  [96.8 °F (36 °C)-97.6 °F (36.4 °C)] 97 °F (36.1 °C)  Heart Rate:  [64-65] 64  Resp:  [18] 18  BP: (131-179)/(58-85) 131/85    Flowsheet Rows      First Filed Value   Admission Height  167.6 cm (66\") Documented at 2021   Admission Weight  127 kg (280 lb) Documented at 2021           I/O last 3 completed shifts:  In: 1470 [P.O.:720; IV Piggyback:750]  Out: 5200 [Urine:5200]    PHYSICAL EXAM    Physical Exam  Vitals reviewed.   Constitutional:       Appearance: Normal appearance.   HENT:      Nose: Nose normal.   Cardiovascular:      Rate and Rhythm: Normal rate and regular rhythm.      Pulses: Normal pulses.      Heart sounds: Normal heart sounds. No murmur heard.     Pulmonary:      Effort: Pulmonary effort is normal.      Breath sounds: No wheezing or rales.   Abdominal:      General: Abdomen is flat. There is no distension.      Palpations: Abdomen is soft.      Tenderness: There is no abdominal tenderness.   Musculoskeletal:         General: Swelling and tenderness present.   Skin:     Coloration: Skin is not jaundiced.      Findings: No erythema.   Neurological:      General: No focal deficit present.      Mental Status: She is alert. She is disoriented.         RESULTS   Results Review:    Results from last 7 days   Lab Units 21  0613 21  0523 21  0526 21  0541 21   SODIUM mmol/L 132* 133* 130*   < > 128*   POTASSIUM mmol/L 4.6 4.6 4.6   < > 3.7   CHLORIDE mmol/L " 99 102 100   < > 94*   CO2 mmol/L 23.0 22.0 20.0*   < > 25.0   BUN mg/dL 39* 34* 36*   < > 41*   CREATININE mg/dL 1.97* 1.99* 2.14*   < > 2.08*   CALCIUM mg/dL 9.0 9.3 8.6   < > 8.7   BILIRUBIN mg/dL  --   --   --   --  0.2   ALK PHOS U/L  --   --   --   --  206*   ALT (SGPT) U/L  --   --   --   --  12   AST (SGOT) U/L  --   --   --   --  19   GLUCOSE mg/dL 278* 210* 226*   < > 305*    < > = values in this interval not displayed.       Estimated Creatinine Clearance: 41.7 mL/min (A) (by C-G formula based on SCr of 1.97 mg/dL (H)).    Results from last 7 days   Lab Units 08/01/21  0544 07/31/21  2228 07/31/21  0541   MAGNESIUM mg/dL 2.3 1.8 1.4*             Results from last 7 days   Lab Units 08/06/21  0613 08/05/21  0523 08/04/21  0526 08/03/21  0523 08/02/21  0518   WBC 10*3/mm3 8.46 7.89 8.78 9.71 9.26   HEMOGLOBIN g/dL 7.3* 7.2* 7.2* 7.6* 7.4*   PLATELETS 10*3/mm3 311 288 267 260 242               Imaging Results (Last 24 Hours)     ** No results found for the last 24 hours. **           MEDICATIONS    [START ON 8/7/2021] Pharmacy to dose vancomycin, , Does not apply, Once  aspirin, 81 mg, Oral, Daily  atorvastatin, 40 mg, Oral, Daily  bumetanide, 2 mg, Intravenous, Daily  cefepime, 2 g, Intravenous, Q12H  cholecalciferol, 50,000 Units, Oral, Weekly  clopidogrel, 75 mg, Oral, Daily  docosanol, , Topical, 5x Daily  DULoxetine, 20 mg, Oral, Daily  ferric gluconate (FERRLECIT) IVPB, 125 mg, Intravenous, Daily  gabapentin, 800 mg, Oral, TID  heparin (porcine), 5,000 Units, Subcutaneous, Q12H  insulin aspart, 0-24 Units, Subcutaneous, TID AC  insulin detemir, 50 Units, Subcutaneous, Q12H  isosorbide mononitrate, 30 mg, Oral, Daily  metoprolol tartrate, 100 mg, Oral, Q12H  metroNIDAZOLE, 500 mg, Intravenous, Q8H  oxybutynin XL, 5 mg, Oral, Daily  sodium bicarbonate, 1,300 mg, Oral, TID  sodium chloride, 10 mL, Intravenous, Q12H  vancomycin, 750 mg, Intravenous, Q24H      Pharmacy to Dose Cefepime,   Pharmacy to dose  vancomycin,         Assessment/Plan   ASSESSMENT / PLAN      Cellulitis of left leg       1.  ERIKA on CKD 4- Baseline creatinine around 2.0, up to 2.7 peak. Now 1.9 off ivf..  tolerating vancomycin for now.  Zosyn has been discontinued and changed to cefepime. tolerated bumex well. Keep iv bumex     2.  Hyponatremia- mild     3.  Left lower extremity cellulitis- on antibiotics per primary team, failed previous antibiotic course. Has u/s of leg didn't show abscess     4.  History of CAD     5.  History of COPD with recent copd exacerbation / pna at outside hospital      6.  Anemia- hemoglobin low - anemia is consistent with fe def / anemia of chronic disease and on iv fe. Also has low b12 in OSH and received b12 yesterday    7. Vitamin d deficiency on vitamin d              This document has been electronically signed by Ace Lauren MD on August 6, 2021 10:21 CDT

## 2021-08-06 NOTE — PROGRESS NOTES
"  Pharmacokinetics by Pharmacy - Vancomycin    Yamileth Slater is a 61 y.o. female  [Ht: 167.6 cm (66\"); Wt: 134 kg (296 lb 6.4 oz)]    Estimated Creatinine Clearance: 41.7 mL/min (A) (by C-G formula based on SCr of 1.97 mg/dL (H)).   Creatinine   Date Value Ref Range Status   08/06/2021 1.97 (H) 0.57 - 1.00 mg/dL Final   08/05/2021 1.99 (H) 0.57 - 1.00 mg/dL Final   08/04/2021 2.14 (H) 0.57 - 1.00 mg/dL Final      Lab Results   Component Value Date    WBC 8.46 08/06/2021    WBC 7.89 08/05/2021    WBC 8.78 08/04/2021     C-Reactive Protein   Date Value Ref Range Status   08/06/2021 7.13 (H) 0.00 - 0.50 mg/dL Final   08/05/2021 9.59 (H) 0.00 - 0.50 mg/dL Final   08/04/2021 11.37 (H) 0.00 - 0.50 mg/dL Final     Lactate   Date Value Ref Range Status   07/30/2021 0.8 0.5 - 2.0 mmol/L Final   09/10/2018 1.3 0.5 - 2.0 mmol/L Final   09/10/2018 2.1 (C) 0.5 - 2.0 mmol/L Final       Temp Readings from Last 1 Encounters:   08/06/21 97 °F (36.1 °C) (Temporal)      Lab Results   Component Value Date    [facility] 08/04[facility]2021    VANCORANDOM 15.80 04/02/2021         Culture Results:  Microbiology Results (last 10 days)       Procedure Component Value - Date/Time    Blood Culture - Blood, Arm, Left [[id number]] Collected: 07/30/21 2011    Lab Status: Final result Specimen: Blood from Arm, Left Updated: 08/04/21 2045     Blood Culture No growth at 5 days    Narrative:      Plated and returned to blood culture incubator at 2200    Blood Culture - Blood, Wrist, Right [[id number]] Collected: 07/30/21 2008    Lab Status: Final result Specimen: Blood from Wrist, Right Updated: 08/04/21 2045     Blood Culture No growth at 5 days    COVID-19 and FLU A/B PCR - Swab, Nasopharynx [[id number]]  (Normal) Collected: 07/30/21 1929    Lab Status: Final result Specimen: Swab from Nasopharynx Updated: 07/30/21 2001     COVID19 Not Detected     Influenza A PCR Not Detected     Influenza B PCR Not Detected    Narrative:      Fact sheet " for providers: https://www.fda.gov/media/074769/download    Fact sheet for patients: https://www.fda.gov/media/028093/download    Test performed by PCR.          No results found for: RESPCX    Indication for use: skin and soft tissue infection      Current Vancomycin Dose:  750 mg IVPB every 24 hours, day 7 of therapy.     Pt is also receiving Cefepime    Assessment/Plan:  Reviewed above labs and cultures. BC NG x 5 days  No wound culture. Continue antibiotics per providers.  Vancomycin trough level was already ordered for 8/7. WBC is 8.46. Cr is 1.97. Will continue current dose.  Pharmacy will continue to monitor renal function and adjust dose accordingly.    Светлана Lal, PharmD   08/06/21 09:21 CDT

## 2021-08-06 NOTE — PROGRESS NOTES
[facility]  INPATIENT PROGRESS NOTE    Length of Stay: 3  Date of Admission: 7/30/2021  Primary Care Physician: Rianna Macias MD    Subjective   Chief Complaint: Lower extremity cellulitis  HPI: Doing well, feels like her leg is some better.  No new concerns.     Review of Systems   Constitutional: Negative for chills and fever.   HENT: Negative for congestion.    Respiratory: Negative for shortness of breath.    Cardiovascular: Negative for chest pain.   Gastrointestinal: Negative for abdominal pain, constipation, diarrhea, nausea and vomiting.   Genitourinary: Negative.    Musculoskeletal: Positive for arthralgias and myalgias.   Neurological: Negative.    Psychiatric/Behavioral: Negative.    All other systems reviewed and are negative.     All pertinent negatives and positives are as above. All other systems have been reviewed and are negative unless otherwise stated.     Objective    Temp:  [96.8 °F (36 °C)-97.6 °F (36.4 °C)] 97 °F (36.1 °C)  Heart Rate:  [64-65] 64  Resp:  [18] 18  BP: (131-179)/(58-85) 131/85    Physical Exam  Constitutional:       General: She is not in acute distress.     Appearance: She is not toxic-appearing.   HENT:      Head: Normocephalic and atraumatic.      Right Ear: External ear normal.      Left Ear: External ear normal.      Nose: Nose normal.      Mouth/Throat:      Mouth: Mucous membranes are moist.      Pharynx: Oropharynx is clear.   Eyes:      Conjunctiva/sclera: Conjunctivae normal.   Cardiovascular:      Rate and Rhythm: Normal rate and regular rhythm.      Pulses: Normal pulses.      Heart sounds: Normal heart sounds.   Pulmonary:      Effort: Pulmonary effort is normal. No respiratory distress.      Breath sounds: Normal breath sounds.   Abdominal:      General: Bowel sounds are normal.      Palpations: Abdomen is soft.      Tenderness: There is no abdominal tenderness.   Musculoskeletal:         General:  Swelling present.      Cervical back: Neck supple.   Skin:     General: Skin is warm.      Capillary Refill: Capillary refill takes less than 2 seconds.      Findings: Erythema present.      Comments: LLE induration, noted with erythema consistent with cellulitis. Slightly improved today   Neurological:      Mental Status: She is alert.   Psychiatric:         Mood and Affect: Mood normal.         Behavior: Behavior normal.         Results Review:  I have reviewed the labs, radiology results, and diagnostic studies.    Laboratory Data:   Results from last 7 days   Lab Units 08/06/21 0613 08/05/21 0523 08/04/21 0526 07/31/21 0541 07/30/21 2011   SODIUM mmol/L 132* 133* 130*   < > 128*   POTASSIUM mmol/L 4.6 4.6 4.6   < > 3.7   CHLORIDE mmol/L 99 102 100   < > 94*   CO2 mmol/L 23.0 22.0 20.0*   < > 25.0   BUN mg/dL 39* 34* 36*   < > 41*   CREATININE mg/dL 1.97* 1.99* 2.14*   < > 2.08*   GLUCOSE mg/dL 278* 210* 226*   < > 305*   CALCIUM mg/dL 9.0 9.3 8.6   < > 8.7   BILIRUBIN mg/dL  --   --   --   --  0.2   ALK PHOS U/L  --   --   --   --  206*   ALT (SGPT) U/L  --   --   --   --  12   AST (SGOT) U/L  --   --   --   --  19   ANION GAP mmol/L 10.0 9.0 10.0   < > 9.0    < > = values in this interval not displayed.     Estimated Creatinine Clearance: 41.7 mL/min (A) (by C-G formula based on SCr of 1.97 mg/dL (H)).  Results from last 7 days   Lab Units 08/01/21 0544 07/31/21 2228 07/31/21 0541   MAGNESIUM mg/dL 2.3 1.8 1.4*         Results from last 7 days   Lab Units 08/06/21 0613 08/05/21 0523 08/04/21 0526 08/03/21 0523 08/02/21  0518   WBC 10*3/mm3 8.46 7.89 8.78 9.71 9.26   HEMOGLOBIN g/dL 7.3* 7.2* 7.2* 7.6* 7.4*   HEMATOCRIT % 23.5* 22.9* 22.8* 24.5* 22.9*   PLATELETS 10*3/mm3 311 288 267 260 242           Culture Data:   No results found for: BLOODCX  No results found for: URINECX  No results found for: RESPCX  No results found for: WOUNDCX  No results found for: STOOLCX  No components found for:  BODYFLD    Radiology Data:   Imaging Results (Last 24 Hours)     ** No results found for the last 24 hours. **          I have reviewed the patient's current medications.     Assessment/Plan     Active Problems:    Cellulitis of left leg  ERIKA on CKD 4  CAD  COPD  Chronic anemia  Type 2 diabetes mellitus    Plan:  -Continue vancomycin and cefepime for now.  She previously been on Zosyn but this was stopped due to ERIKA.   -Continue metronidazole for anaerobic coverage  -Ultrasound was negative for abscess.  -Appreciate general surgery's help.  -Nephrology's been consulted given her renal disease and acute on chronic kidney disease.  Appreciate their help.  -COPD appears stable this time, continue current meds and interventions  -Anemia slightly worse but likely related to renal disease, continue to monitor for any active signs of bleeding.  IV iron ordered per Nephro.  -Continue current pain medications  -Continue current insulin dosing and glucose checks  -DVT prophylaxis with heparin  -CODE STATUS: Full      I confirmed that the patient's Advance Care Plan is present, code status is documented, or surrogate decision maker is listed in the patient's medical record.     Discharge Planning: In process.    Esau Ferguson MD

## 2021-08-06 NOTE — SIGNIFICANT NOTE
08/06/21 1105   OTHER   Discipline physical therapy assistant   Rehab Time/Intention   Session Not Performed patient/family declined treatment  (pt requested PTA check back in the pm)

## 2021-08-06 NOTE — CONSULTS
"Adult Nutrition  Assessment    Patient Name:  Yamileth Slater  YOB: 1960  MRN: [id number]  Admit Date:  7/30/2021    Assessment Date:  8/6/2021    Comments:  Pt is eating well.  She voices no complaints or requests.  Claims that she is trying to eat healthy, salads and healthy options.  RD  Encouraged healthy eating for a healthy weight.  She voiced understanding.   Will monitor.    Reason for Assessment     Row Name 08/06/21 1214          Reason for Assessment    Reason For Assessment  follow-up protocol         Nutrition/Diet History     Row Name 08/06/21 1214          Nutrition/Diet History    Typical Food/Fluid Intake  Pt reports that her leg still hurts.  She is eating well.  \"I been eating a lot of salads\"  She has been trying to eat healthier.                           Electronically signed by:  Gissel Littlejohn RD  08/06/21 12:20 CDT  "

## 2021-08-06 NOTE — PLAN OF CARE
Goal Outcome Evaluation:  Plan of Care Reviewed With: patient        Progress: improving  Outcome Summary: VSS overnight. c/o pain to left leg. prn pain medication adm x2. no acute events over night. pt continues to get iv abx. will cont to monitor   Night time glucose 418- hospitalist on call was notified. Orders for sliding scale insulin @ nighttime placed.

## 2021-08-07 LAB
ANION GAP SERPL CALCULATED.3IONS-SCNC: 9 MMOL/L (ref 5–15)
BASOPHILS # BLD AUTO: 0.1 10*3/MM3 (ref 0–0.2)
BASOPHILS NFR BLD AUTO: 1.2 % (ref 0–1.5)
BUN SERPL-MCNC: 40 MG/DL (ref 8–23)
BUN/CREAT SERPL: 20.4 (ref 7–25)
CALCIUM SPEC-SCNC: 8.9 MG/DL (ref 8.6–10.5)
CHLORIDE SERPL-SCNC: 101 MMOL/L (ref 98–107)
CO2 SERPL-SCNC: 26 MMOL/L (ref 22–29)
CREAT SERPL-MCNC: 1.96 MG/DL (ref 0.57–1)
CRP SERPL-MCNC: 7 MG/DL (ref 0–0.5)
DEPRECATED RDW RBC AUTO: 48 FL (ref 37–54)
EOSINOPHIL # BLD AUTO: 0.31 10*3/MM3 (ref 0–0.4)
EOSINOPHIL NFR BLD AUTO: 3.6 % (ref 0.3–6.2)
ERYTHROCYTE [DISTWIDTH] IN BLOOD BY AUTOMATED COUNT: 15.2 % (ref 12.3–15.4)
GFR SERPL CREATININE-BSD FRML MDRD: 26 ML/MIN/1.73
GLUCOSE BLDC GLUCOMTR-MCNC: 204 MG/DL (ref 70–130)
GLUCOSE BLDC GLUCOMTR-MCNC: 215 MG/DL (ref 70–130)
GLUCOSE BLDC GLUCOMTR-MCNC: 344 MG/DL (ref 70–130)
GLUCOSE SERPL-MCNC: 186 MG/DL (ref 65–99)
GLUCOSE SERPL-MCNC: 373 MG/DL (ref 65–99)
HCT VFR BLD AUTO: 23.4 % (ref 34–46.6)
HGB BLD-MCNC: 7.3 G/DL (ref 12–15.9)
IMM GRANULOCYTES # BLD AUTO: 0.13 10*3/MM3 (ref 0–0.05)
IMM GRANULOCYTES NFR BLD AUTO: 1.5 % (ref 0–0.5)
LYMPHOCYTES # BLD AUTO: 2.45 10*3/MM3 (ref 0.7–3.1)
LYMPHOCYTES NFR BLD AUTO: 28.8 % (ref 19.6–45.3)
MCH RBC QN AUTO: 27.8 PG (ref 26.6–33)
MCHC RBC AUTO-ENTMCNC: 31.2 G/DL (ref 31.5–35.7)
MCV RBC AUTO: 89 FL (ref 79–97)
MONOCYTES # BLD AUTO: 0.59 10*3/MM3 (ref 0.1–0.9)
MONOCYTES NFR BLD AUTO: 6.9 % (ref 5–12)
NEUTROPHILS NFR BLD AUTO: 4.93 10*3/MM3 (ref 1.7–7)
NEUTROPHILS NFR BLD AUTO: 58 % (ref 42.7–76)
NRBC BLD AUTO-RTO: 0 /100 WBC (ref 0–0.2)
PLATELET # BLD AUTO: 291 10*3/MM3 (ref 140–450)
PMV BLD AUTO: 8.8 FL (ref 6–12)
POTASSIUM SERPL-SCNC: 4.5 MMOL/L (ref 3.5–5.2)
RBC # BLD AUTO: 2.63 10*6/MM3 (ref 3.77–5.28)
SODIUM SERPL-SCNC: 136 MMOL/L (ref 136–145)
VANCOMYCIN TROUGH SERPL-MCNC: 20.3 MCG/ML (ref 5–20)
WBC # BLD AUTO: 8.51 10*3/MM3 (ref 3.4–10.8)

## 2021-08-07 PROCEDURE — 94760 N-INVAS EAR/PLS OXIMETRY 1: CPT

## 2021-08-07 PROCEDURE — 82962 GLUCOSE BLOOD TEST: CPT

## 2021-08-07 PROCEDURE — 25010000002 NA FERRIC GLUC CPLX PER 12.5 MG: Performed by: INTERNAL MEDICINE

## 2021-08-07 PROCEDURE — 63710000001 INSULIN DETEMIR PER 5 UNITS: Performed by: FAMILY MEDICINE

## 2021-08-07 PROCEDURE — 63710000001 INSULIN ASPART PER 5 UNITS: Performed by: FAMILY MEDICINE

## 2021-08-07 PROCEDURE — 80048 BASIC METABOLIC PNL TOTAL CA: CPT | Performed by: FAMILY MEDICINE

## 2021-08-07 PROCEDURE — 97116 GAIT TRAINING THERAPY: CPT

## 2021-08-07 PROCEDURE — 94799 UNLISTED PULMONARY SVC/PX: CPT

## 2021-08-07 PROCEDURE — 25010000002 CEFEPIME PER 500 MG: Performed by: FAMILY MEDICINE

## 2021-08-07 PROCEDURE — 82947 ASSAY GLUCOSE BLOOD QUANT: CPT | Performed by: HOSPITALIST

## 2021-08-07 PROCEDURE — 25010000002 HEPARIN (PORCINE) PER 1000 UNITS: Performed by: INTERNAL MEDICINE

## 2021-08-07 PROCEDURE — 86140 C-REACTIVE PROTEIN: CPT | Performed by: FAMILY MEDICINE

## 2021-08-07 PROCEDURE — 25010000002 HYDROMORPHONE 1 MG/ML SOLUTION: Performed by: INTERNAL MEDICINE

## 2021-08-07 PROCEDURE — 80202 ASSAY OF VANCOMYCIN: CPT | Performed by: FAMILY MEDICINE

## 2021-08-07 PROCEDURE — 97110 THERAPEUTIC EXERCISES: CPT

## 2021-08-07 PROCEDURE — 85025 COMPLETE CBC W/AUTO DIFF WBC: CPT | Performed by: FAMILY MEDICINE

## 2021-08-07 PROCEDURE — 25010000002 HYDRALAZINE PER 20 MG: Performed by: INTERNAL MEDICINE

## 2021-08-07 RX ORDER — HYDRALAZINE HYDROCHLORIDE 20 MG/ML
10 INJECTION INTRAMUSCULAR; INTRAVENOUS EVERY 6 HOURS PRN
Status: DISCONTINUED | OUTPATIENT
Start: 2021-08-07 | End: 2021-08-12 | Stop reason: HOSPADM

## 2021-08-07 RX ADMIN — GABAPENTIN 800 MG: 400 CAPSULE ORAL at 21:39

## 2021-08-07 RX ADMIN — METRONIDAZOLE 500 MG: 500 INJECTION, SOLUTION INTRAVENOUS at 18:11

## 2021-08-07 RX ADMIN — CEFEPIME HYDROCHLORIDE 2 G: 2 INJECTION, POWDER, FOR SOLUTION INTRAVENOUS at 12:07

## 2021-08-07 RX ADMIN — SODIUM BICARBONATE 1300 MG: 650 TABLET ORAL at 21:39

## 2021-08-07 RX ADMIN — SODIUM BICARBONATE 1300 MG: 650 TABLET ORAL at 09:16

## 2021-08-07 RX ADMIN — Medication: at 21:36

## 2021-08-07 RX ADMIN — HYDROCODONE BITARTRATE AND ACETAMINOPHEN 1 TABLET: 5; 325 TABLET ORAL at 21:39

## 2021-08-07 RX ADMIN — DULOXETINE HYDROCHLORIDE 20 MG: 20 CAPSULE, DELAYED RELEASE ORAL at 09:16

## 2021-08-07 RX ADMIN — GABAPENTIN 800 MG: 400 CAPSULE ORAL at 16:00

## 2021-08-07 RX ADMIN — SODIUM CHLORIDE, PRESERVATIVE FREE 10 ML: 5 INJECTION INTRAVENOUS at 12:55

## 2021-08-07 RX ADMIN — HYDROCODONE BITARTRATE AND ACETAMINOPHEN 1 TABLET: 5; 325 TABLET ORAL at 10:19

## 2021-08-07 RX ADMIN — HYDROMORPHONE HYDROCHLORIDE 0.5 MG: 1 INJECTION, SOLUTION INTRAMUSCULAR; INTRAVENOUS; SUBCUTANEOUS at 05:37

## 2021-08-07 RX ADMIN — GABAPENTIN 800 MG: 400 CAPSULE ORAL at 09:16

## 2021-08-07 RX ADMIN — INSULIN ASPART 4 UNITS: 100 INJECTION, SOLUTION INTRAVENOUS; SUBCUTANEOUS at 12:07

## 2021-08-07 RX ADMIN — INSULIN ASPART 16 UNITS: 100 INJECTION, SOLUTION INTRAVENOUS; SUBCUTANEOUS at 18:11

## 2021-08-07 RX ADMIN — ATORVASTATIN CALCIUM 40 MG: 40 TABLET, FILM COATED ORAL at 09:16

## 2021-08-07 RX ADMIN — CEFEPIME HYDROCHLORIDE 2 G: 2 INJECTION, POWDER, FOR SOLUTION INTRAVENOUS at 23:59

## 2021-08-07 RX ADMIN — OXYBUTYNIN CHLORIDE 5 MG: 5 TABLET, EXTENDED RELEASE ORAL at 09:16

## 2021-08-07 RX ADMIN — METOPROLOL TARTRATE 100 MG: 50 TABLET, FILM COATED ORAL at 09:16

## 2021-08-07 RX ADMIN — METOPROLOL TARTRATE 100 MG: 50 TABLET, FILM COATED ORAL at 21:39

## 2021-08-07 RX ADMIN — INSULIN DETEMIR 50 UNITS: 100 INJECTION, SOLUTION SUBCUTANEOUS at 09:00

## 2021-08-07 RX ADMIN — DOCOSANOL: 100 CREAM TOPICAL at 18:53

## 2021-08-07 RX ADMIN — HEPARIN SODIUM 5000 UNITS: 5000 INJECTION INTRAVENOUS; SUBCUTANEOUS at 09:16

## 2021-08-07 RX ADMIN — SODIUM CHLORIDE, PRESERVATIVE FREE 10 ML: 5 INJECTION INTRAVENOUS at 21:42

## 2021-08-07 RX ADMIN — DOCOSANOL: 100 CREAM TOPICAL at 21:47

## 2021-08-07 RX ADMIN — INSULIN DETEMIR 50 UNITS: 100 INJECTION, SOLUTION SUBCUTANEOUS at 21:37

## 2021-08-07 RX ADMIN — METRONIDAZOLE 500 MG: 500 INJECTION, SOLUTION INTRAVENOUS at 02:47

## 2021-08-07 RX ADMIN — ASPIRIN 81 MG: 81 TABLET, FILM COATED ORAL at 09:16

## 2021-08-07 RX ADMIN — INSULIN ASPART 4 UNITS: 100 INJECTION, SOLUTION INTRAVENOUS; SUBCUTANEOUS at 09:15

## 2021-08-07 RX ADMIN — CLOPIDOGREL BISULFATE 75 MG: 75 TABLET ORAL at 09:16

## 2021-08-07 RX ADMIN — DOCOSANOL: 100 CREAM TOPICAL at 13:13

## 2021-08-07 RX ADMIN — HYDRALAZINE HYDROCHLORIDE 10 MG: 20 INJECTION INTRAMUSCULAR; INTRAVENOUS at 09:35

## 2021-08-07 RX ADMIN — DOCOSANOL: 100 CREAM TOPICAL at 07:00

## 2021-08-07 RX ADMIN — ISOSORBIDE MONONITRATE 30 MG: 30 TABLET, EXTENDED RELEASE ORAL at 09:16

## 2021-08-07 RX ADMIN — HEPARIN SODIUM 5000 UNITS: 5000 INJECTION INTRAVENOUS; SUBCUTANEOUS at 21:39

## 2021-08-07 RX ADMIN — HYDROMORPHONE HYDROCHLORIDE 0.5 MG: 1 INJECTION, SOLUTION INTRAMUSCULAR; INTRAVENOUS; SUBCUTANEOUS at 12:08

## 2021-08-07 RX ADMIN — DOCOSANOL: 100 CREAM TOPICAL at 11:28

## 2021-08-07 RX ADMIN — METRONIDAZOLE 500 MG: 500 INJECTION, SOLUTION INTRAVENOUS at 12:08

## 2021-08-07 RX ADMIN — BUMETANIDE 2 MG: 0.25 INJECTION INTRAMUSCULAR; INTRAVENOUS at 09:15

## 2021-08-07 RX ADMIN — HYDROMORPHONE HYDROCHLORIDE 0.5 MG: 1 INJECTION, SOLUTION INTRAMUSCULAR; INTRAVENOUS; SUBCUTANEOUS at 18:11

## 2021-08-07 RX ADMIN — SODIUM BICARBONATE 1300 MG: 650 TABLET ORAL at 16:00

## 2021-08-07 RX ADMIN — SODIUM CHLORIDE 125 MG: 9 INJECTION, SOLUTION INTRAVENOUS at 09:16

## 2021-08-07 NOTE — PROGRESS NOTES
"  Pharmacokinetics by Pharmacy - Vancomycin    Yamileth Slater is a 61 y.o. female  [Ht: 167.6 cm (66\"); Wt: (!) 136 kg (300 lb 6.4 oz)]    Estimated Creatinine Clearance: 42.3 mL/min (A) (by C-G formula based on SCr of 1.96 mg/dL (H)).   Creatinine   Date Value Ref Range Status   08/07/2021 1.96 (H) 0.57 - 1.00 mg/dL Final   08/06/2021 1.97 (H) 0.57 - 1.00 mg/dL Final   08/05/2021 1.99 (H) 0.57 - 1.00 mg/dL Final      Lab Results   Component Value Date    WBC 8.51 08/07/2021    WBC 8.46 08/06/2021    WBC 7.89 08/05/2021     C-Reactive Protein   Date Value Ref Range Status   08/07/2021 7.00 (H) 0.00 - 0.50 mg/dL Final   08/06/2021 7.13 (H) 0.00 - 0.50 mg/dL Final   08/05/2021 9.59 (H) 0.00 - 0.50 mg/dL Final     Lactate   Date Value Ref Range Status   07/30/2021 0.8 0.5 - 2.0 mmol/L Final   09/10/2018 1.3 0.5 - 2.0 mmol/L Final   09/10/2018 2.1 (C) 0.5 - 2.0 mmol/L Final       Temp Readings from Last 1 Encounters:   08/07/21 97.3 °F (36.3 °C) (Oral)      Lab Results   Component Value Date    VANCOTROUGH 24.90 (H) 08/04/2021    VANCORANDOM 15.80 04/02/2021         Culture Results:  Microbiology Results (last 10 days)       Procedure Component Value - Date/Time    Blood Culture - Blood, Arm, Left [[id number]] Collected: 07/30/21 2011    Lab Status: Final result Specimen: Blood from Arm, Left Updated: 08/04/21 2045     Blood Culture No growth at 5 days    Narrative:      Plated and returned to blood culture incubator at 2200    Blood Culture - Blood, Wrist, Right [[id number]] Collected: 07/30/21 2008    Lab Status: Final result Specimen: Blood from Wrist, Right Updated: 08/04/21 2045     Blood Culture No growth at 5 days    COVID-19 and FLU A/B PCR - Swab, Nasopharynx [[id number]]  (Normal) Collected: 07/30/21 1929    Lab Status: Final result Specimen: Swab from Nasopharynx Updated: 07/30/21 2001     COVID19 Not Detected     Influenza A PCR Not Detected     Influenza B PCR Not Detected    Narrative:      Fact sheet " for providers: https://www.fda.gov/media/892708/download    Fact sheet for patients: https://www.fda.gov/media/694365/download    Test performed by PCR.          No results found for: RESPCX    Indication for use: skin and soft tissue infection      Current Vancomycin Dose:  750 mg IVPB every 24 hours, day 8 of therapy.     Pt is also receiving Cefepime    Assessment/Plan:  Reviewed above labs and cultures. NG x 5 days. No repeat cultures. Vancomycin trough level is ordered for today at 1730. Levels have been therapeutic.  WBC is 8.51. Cr is 1.96. Spoke to provider regarding de escalation to oral antibiotics. Cellulitis is just starting to improve. Provider wants to continue with current IV therapy.  Pharmacy will continue to monitor renal function and adjust dose accordingly.    Светлана Lal, PharmD   08/07/21 10:11 CDT

## 2021-08-07 NOTE — PLAN OF CARE
Goal Outcome Evaluation:  Plan of Care Reviewed With: patient        Progress: improving  Outcome Summary: Pt lopez tx well with good participation. Pt t/f sup-sit with SBVAx1, sit-stand-sit with SBAx1. Pt amb 8', 12' with FWRW with CGAx1.  Pt up in chair post tx.  Pt performed B LE therex x20.

## 2021-08-07 NOTE — THERAPY TREATMENT NOTE
Acute Care - Physical Therapy Treatment Note  [facility]     Patient Name: Yamileth Slater  : 1960  MRN: [id number]  Today's Date: 2021      Visit Dx:     ICD-10-CM ICD-9-CM   1. Cellulitis of left leg  L03.116 682.6   2. Anemia, unspecified type  D64.9 285.9   3. Chronic kidney disease, unspecified CKD stage  N18.9 585.9   4. Type 2 diabetes mellitus with hyperglycemia, unspecified whether long term insulin use (CMS/[facility])  E11.65 250.00   5. Impaired mobility and ADLs  Z74.09 V49.89    Z78.9      Patient Active Problem List   Diagnosis   • Type 2 diabetes mellitus with diabetic polyneuropathy, with long-term current use of insulin (CMS/[facility])   • Chronic obstructive pulmonary disease (CMS/[facility])   • Chronic midline low back pain without sciatica   • Vitamin D deficiency   • Type 2 diabetes mellitus (CMS/[facility])   • Tobacco dependence syndrome   • Surgical follow-up care   • Shoulder joint pain   • Peripheral vascular disease (CMS/[facility])   • Pain   • Neurologic disorder associated with diabetes mellitus (CMS/[facility])   • Morbid obesity with BMI of 50.0-59.9, adult (CMS/[facility])   • Mixed hyperlipidemia   • Kidney stone   • History of colon polyps   • GERD (gastroesophageal reflux disease)   • Excoriated eczema   • Essential hypertension   • Encounter for medication refill   • Dyslipidemia   • Diabetes mellitus (CMS/[facility])   • Coronary artery disease   • Chronic obstructive lung disease (CMS/[facility])   • Chronic folliculitis   • Chest pain   • Mild persistent asthma   • 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   • Uncontrolled type 2 diabetes mellitus with complication, with long-term current use of insulin (CMS/[facility])   • Anemia   • Hypothyroidism   • Carotid artery disease (CMS/[facility])   • Current smoker   • DM type 2 with diabetic peripheral neuropathy (CMS/[facility])   • [place] abuse   • PAD (peripheral artery disease) (CMS/[facility])   • S/P CABG x 3   • Intercostal pain   • Venous insufficiency   • Dyspnea on  exertion   • LAFB (left anterior fascicular block)   • Pulmonary hypertension (CMS/[facility])   • Left hip pain   • Neck pain   • Acute kidney injury superimposed on chronic kidney disease (CMS/HCC)   • MIKE and COPD overlap syndrome (CMS/[facility])   • Pneumonia due to COVID-19 virus   • CKD (chronic kidney disease) stage 4, GFR 15-29 ml/min (CMS/[facility])   • Morbid obesity (CMS/[facility])   • Type 2 diabetes mellitus with hyperglycemia, with long-term current use of insulin (CMS/[facility])   • Hyponatremia   • Hyperkalemia   • Anemia   • Cutaneous candidiasis   • Community acquired pneumonia of right middle lobe of lung   • MRSA infection   • Alkaline phosphatase elevation   • COVID-19 ruled out by laboratory testing   • Esophageal dysphagia   • Monilial esophagitis (CMS/[facility])   • NSTEMI, initial episode of care (CMS/[facility])   • CAD (coronary artery disease)   • Gastrointestinal hemorrhage   • Chronic respiratory failure with hypoxia (CMS/[facility])   • Pneumonia due to infectious organism   • Chronic hypercapnic respiratory failure (CMS/[facility])   • Cellulitis of left leg     Past Medical History:   Diagnosis Date   • Anxiety    • Carotid artery stenosis    • Chronic obstructive lung disease (CMS/[facility])    • CKD (chronic kidney disease) stage 4, GFR 15-29 ml/min (CMS/[facility])    • Colonic polyp    • Coronary arteriosclerosis    • Diabetes mellitus (CMS/[facility])    • Diabetic neuropathy (CMS/[facility])    • GERD (gastroesophageal reflux disease)    • History of transfusion    • Hypercholesterolemia    • Hypertension    • Hypomagnesemia 6/27/2021    Monitor and replace   • Morbid obesity (CMS/[facility])    • Nephrolithiasis    • Peripheral vascular disease (CMS/[facility])    • Sleep apnea    • Substance abuse (CMS/[facility])    • Vitamin D deficiency      Past Surgical History:   Procedure Laterality Date   • CARDIAC CATHETERIZATION N/A 7/14/2020   • CARDIAC CATHETERIZATION N/A 4/23/2021    Procedure: Left Heart Cath;  Surgeon: Melba Romo MD;  Location: [facility] CATH INVASIVE  LOCATION;  Service: Cardiology;  Laterality: N/A;   • CARDIAC CATHETERIZATION N/A 4/30/2021    Procedure: Percutaneous Coronary Intervention;  Surgeon: Russell Voss MD;  Location: [facility] CATH INVASIVE LOCATION;  Service: Cardiovascular;  Laterality: N/A;   • CARDIAC CATHETERIZATION N/A 4/30/2021    Procedure: Stent NIKKI coronary;  Surgeon: Russell Voss MD;  [facility];  Service: Cardiovascular;  Laterality: N/A;   • CAROTID STENT Left    • COLONOSCOPY     • COLONOSCOPY N/A 5/14/2021    Procedure: COLONOSCOPY;  Surgeon: Mingo Duarte MD;  Location: [facility] ENDOSCOPY;  Service: Gastroenterology;  Laterality: N/A;   • CORONARY ARTERY BYPASS GRAFT N/A 2013    CABG X 3   • CYSTOSCOPY BLADDER STONE LITHOTRIPSY Bilateral    • ENDOSCOPY N/A 4/12/2021    Procedure: ESOPHAGOGASTRODUODENOSCOPY;  Surgeon: Mingo Duarte MD;  Location: [facility] ENDOSCOPY;  Service: Gastroenterology;  Laterality: N/A;   • ENDOSCOPY N/A 5/14/2021    Procedure: ESOPHAGOGASTRODUODENOSCOPY;  Surgeon: Mingo Duarte MD;  Location: [facility] ENDOSCOPY;  Service: Gastroenterology;  Laterality: N/A;        PT Assessment (last 12 hours)      PT Evaluation and Treatment     Row Name 08/07/21 1133          Physical Therapy Time and Intention    Subjective Information  no complaints  -EM     Document Type  therapy note (daily note)  -EM     Mode of Treatment  individual therapy;physical therapy  -EM     Patient Effort  good  -EM     Row Name 08/07/21 1133          Cognition    Affect/Mental Status (Cognitive)  WFL  -EM     Orientation Status (Cognition)  oriented x 4  -EM     Cognitive Function (Cognitive)  WFL  -EM     Personal Safety Interventions  fall prevention program maintained;gait belt;nonskid shoes/slippers when out of bed;supervised activity  -EM     Row Name 08/07/21 113          Pain Scale: Numbers Pre/Post-Treatment    Pretreatment Pain Rating  9/10  -EM     Posttreatment Pain Rating  8/10   -EM     Pain Location - Side  Left  -EM     Pain Location - Orientation  lower  -EM     Pain Location  extremity  -EM     Row Name 08/07/21 1133          Bed Mobility    Supine-Sit [place] (Bed Mobility)  supervision  -EM     Sit-Supine [place] (Bed Mobility)  supervision  -EM     Row Name 08/07/21 1133          Transfers    Sit-Stand [place] (Transfers)  standby assist  -EM     Stand-Sit [place] (Transfers)  supervision  -EM     Row Name 08/07/21 1133          Sit-Stand Transfer    Assistive Device (Sit-Stand Transfers)  walker, front-wheeled  -EM     Row Name 08/07/21 1133          Stand-Sit Transfer    Assistive Device (Stand-Sit Transfers)  walker, front-wheeled  -EM     Row Name 08/07/21 1133          Gait/Stairs (Locomotion)    [place] Level (Gait)  standby assist  -EM     Assistive Device (Gait)  walker, front-wheeled  -EM     Distance in Feet (Gait)  8', 12'  -EM     Pattern (Gait)  step-through  -EM     Deviations/Abnormal Patterns (Gait)  antalgic;nasir decreased;gait speed decreased  -EM     Row Name 08/07/21 1133          Hip (Therapeutic Exercise)    Hip AROM (Therapeutic Exercise)  bilateral;flexion;aDduction;sitting 2x10  -EM     Row Name 08/07/21 1133          Knee (Therapeutic Exercise)    Knee AROM (Therapeutic Exercise)  bilateral;LAQ (long arc quad);2 sets;10 repetitions;sitting  -EM     Row Name 08/07/21 1133          Ankle (Therapeutic Exercise)    Ankle AROM (Therapeutic Exercise)  bilateral;plantarflexion;dorsiflexion;2 sets;10 repetitions;sitting  -EM     Row Name 08/07/21 1133          Vital Signs    Pre Systolic BP Rehab  166  -EM     Pre Treatment Diastolic BP  66  -EM     Post Systolic BP Rehab  174  -EM     Post Treatment Diastolic BP  81  -EM     Pretreatment Heart Rate (beats/min)  64  -EM     Posttreatment Heart Rate (beats/min)  96  -EM     Pre SpO2 (%)  99  -EM     O2 Delivery Pre Treatment  supplemental O2  -EM     Post SpO2 (%)  98  -EM     O2 Delivery  Post Treatment  supplemental O2  -EM     Pre Patient Position  Supine  -EM     Intra Patient Position  Standing  -EM     Post Patient Position  Sitting  -EM     Row Name 08/07/21 1133          Bed Mobility Goal 1 (PT)    Activity/Assistive Device (Bed Mobility Goal 1, PT)  sit to supine;supine to sit  -EM     [place] Level/Cues Needed (Bed Mobility Goal 1, PT)  independent  -EM     Time Frame (Bed Mobility Goal 1, PT)  by discharge  -EM     Progress/Outcomes (Bed Mobility Goal 1, PT)  goal not met  -EM     Row Name 08/07/21 1133          Transfer Goal 1 (PT)    Activity/Assistive Device (Transfer Goal 1, PT)  sit-to-stand/stand-to-sit;bed-to-chair/chair-to-bed  -EM     [place] Level/Cues Needed (Transfer Goal 1, PT)  modified independence  -EM     Time Frame (Transfer Goal 1, PT)  by discharge  -EM     Progress/Outcome (Transfer Goal 1, PT)  goal not met  -EM     Row Name 08/07/21 1133          Gait Training Goal 1 (PT)    Activity/Assistive Device (Gait Training Goal 1, PT)  gait (walking locomotion);assistive device use;backward stepping  -EM     [place] Level (Gait Training Goal 1, PT)  modified independence  -EM     Distance (Gait Training Goal 1, PT)  25'x2  -EM     Time Frame (Gait Training Goal 1, PT)  by discharge  -EM     Progress/Outcome (Gait Training Goal 1, PT)  goal not met  -EM     Row Name 08/07/21 1133          Positioning and Restraints    Pre-Treatment Position  in bed  -EM     Post Treatment Position  chair  -EM     In Chair  sitting;call light within reach;encouraged to call for assist nsg declined chair alarm stating it wasnt needed.  -EM       User Key  (r) = Recorded By, (t) = Taken By, (c) = Cosigned By    Initials Name Provider Type    EM Nadeem Patricia, PTA Physical Therapy Assistant        Physical Therapy Education                 Title: PT OT SLP Therapies (Done)     Topic: Physical Therapy (Done)     Point: Mobility training (Done)     Learning Progress Summary            Patient Acceptance, E,TB, VU by LW at 8/6/2021 1259    Acceptance, E,TB, VU by LR at 8/4/2021 0821    Comment: Educated on PT POC and goals.                   Point: Home exercise program (Done)     Learning Progress Summary           Patient Acceptance, E,TB, VU by LW at 8/6/2021 1259    Acceptance, E,TB, VU by LR at 8/4/2021 0821    Comment: Educated on PT POC and goals.                   Point: Body mechanics (Done)     Learning Progress Summary           Patient Acceptance, E,TB, VU by LW at 8/6/2021 1259    Acceptance, E,TB, VU by LR at 8/4/2021 0821    Comment: Educated on PT POC and goals.                   Point: Precautions (Done)     Learning Progress Summary           Patient Acceptance, E,TB, VU by LW at 8/6/2021 1259    Acceptance, E,TB, VU by LR at 8/4/2021 0821    Comment: Educated on PT POC and goals.    Acceptance, E,TB, VU by SC at 7/31/2021 1007                               User Key     Initials Effective Dates Name Provider Type Discipline    LW 06/16/21 -  Rekha Perez COTA/L Occupational Therapy Assistant OT    LR 06/16/21 -  Lucien Gilman Physical Therapist PT    SC 07/21/21 -  Angela West RN Registered Nurse Nurse              PT Recommendation and Plan  Anticipated Discharge Disposition (PT): home with 24/7 care, home with home health, skilled nursing facility  Plan of Care Reviewed With: patient  Progress: improving  Outcome Summary: Pt lopez tx well with good participation. Pt t/f sup-sit with SBVAx1, sit-stand-sit with SBAx1. Pt amb 8', 12' with FWRW with CGAx1.  Pt up in chair post tx.  Pt performed B LE therex x20.  Outcome Measures     Row Name 08/07/21 1300 08/05/21 1600          How much help from another person do you currently need...    Turning from your back to your side while in flat bed without using bedrails?  3  -EM  3  -TA     Moving from lying on back to sitting on the side of a flat bed without bedrails?  3  -EM  3  -TA     Moving to and from a bed to a chair  (including a wheelchair)?  3  -EM  3  -TA     Standing up from a chair using your arms (e.g., wheelchair, bedside chair)?  3  -EM  3  -TA     Climbing 3-5 steps with a railing?  3  -EM  2  -TA     To walk in hospital room?  3  -EM  3  -TA     AM-PAC 6 Clicks Score (PT)  18  -EM  17  -TA        Functional Assessment    Outcome Measure Options  AM-PAC 6 Clicks Basic Mobility (PT)  -EM  AM-PAC 6 Clicks Basic Mobility (PT)  -TA       User Key  (r) = Recorded By, (t) = Taken By, (c) = Cosigned By    Initials Name Provider Type    Nadeem Horton PTA Physical Therapy Assistant    Sue Matute PTA Physical Therapy Assistant           Time Calculation:   PT Charges     Row Name 08/07/21 1320             Time Calculation    Start Time  1133  -EM      Stop Time  1206  -EM      Time Calculation (min)  33 min  -EM         Time Calculation- PT    Total Timed Code Minutes- PT  33 minute(s)  -EM        User Key  (r) = Recorded By, (t) = Taken By, (c) = Cosigned By    Initials Name Provider Type    Nadeem Horton PTA Physical Therapy Assistant        Therapy Charges for Today     Code Description Service Date Service Provider Modifiers Qty    03298796996 HC PT THER PROC EA 15 MIN 8/7/2021 Nadeem Patricia PTA GP 1    [id number] HC GAIT TRAINING EA 15 MIN 8/7/2021 Nadeem Patricia PTA GP 1          PT G-Codes  Outcome Measure Options: AM-PAC 6 Clicks Basic Mobility (PT)  AM-PAC 6 Clicks Score (PT): 18  AM-PAC 6 Clicks Score (OT): 18    Nadeem Patricia PTA  8/7/2021

## 2021-08-07 NOTE — PLAN OF CARE
Goal Outcome Evaluation:           Progress: improving  Outcome Summary: pt has rested well through night; no s/s of distress; will continue to monitor.

## 2021-08-07 NOTE — PROGRESS NOTES
[facility] Medicine Services  INPATIENT PROGRESS NOTE    Length of Stay: 4  Date of Admission: 7/30/2021  Primary Care Physician: Rianna Macias MD    Subjective   Chief Complaint: Lower extremity cellulitis  HPI: Leg still hurts but improving.  No other new concerns right now.      Review of Systems   Constitutional: Negative for chills and fever.   HENT: Negative for congestion.    Respiratory: Negative for shortness of breath.    Cardiovascular: Negative for chest pain.   Gastrointestinal: Negative for abdominal pain, constipation, diarrhea, nausea and vomiting.   Genitourinary: Negative.    Musculoskeletal: Positive for arthralgias and myalgias.   Neurological: Negative.    Psychiatric/Behavioral: Negative.    All other systems reviewed and are negative.     All pertinent negatives and positives are as above. All other systems have been reviewed and are negative unless otherwise stated.     Objective    Temp:  [96.9 °F (36.1 °C)-97.5 °F (36.4 °C)] 97.5 °F (36.4 °C)  Heart Rate:  [59-65] 60  Resp:  [18] 18  BP: (154-183)/(78-93) 156/88    Physical Exam  Constitutional:       General: She is not in acute distress.     Appearance: She is not toxic-appearing.   HENT:      Head: Normocephalic and atraumatic.      Right Ear: External ear normal.      Left Ear: External ear normal.      Nose: Nose normal.      Mouth/Throat:      Mouth: Mucous membranes are moist.      Pharynx: Oropharynx is clear.   Eyes:      Conjunctiva/sclera: Conjunctivae normal.   Cardiovascular:      Rate and Rhythm: Normal rate and regular rhythm.      Pulses: Normal pulses.      Heart sounds: Normal heart sounds.   Pulmonary:      Effort: Pulmonary effort is normal. No respiratory distress.      Breath sounds: Normal breath sounds.   Abdominal:      General: Bowel sounds are normal.      Palpations: Abdomen is soft.      Tenderness: There is no abdominal tenderness.   Musculoskeletal:         General:  Swelling present.      Cervical back: Neck supple.   Skin:     General: Skin is warm.      Capillary Refill: Capillary refill takes less than 2 seconds.      Findings: Erythema present.      Comments: LLE induration, noted with erythema consistent with cellulitis. Slightly improved today   Neurological:      Mental Status: She is alert.   Psychiatric:         Mood and Affect: Mood normal.         Behavior: Behavior normal.         Results Review:  I have reviewed the labs, radiology results, and diagnostic studies.    Laboratory Data:   Results from last 7 days   Lab Units 08/07/21  0649 08/06/21  0613 08/05/21  0523   SODIUM mmol/L 136 132* 133*   POTASSIUM mmol/L 4.5 4.6 4.6   CHLORIDE mmol/L 101 99 102   CO2 mmol/L 26.0 23.0 22.0   BUN mg/dL 40* 39* 34*   CREATININE mg/dL 1.96* 1.97* 1.99*   GLUCOSE mg/dL 186* 278* 210*   CALCIUM mg/dL 8.9 9.0 9.3   ANION GAP mmol/L 9.0 10.0 9.0     Estimated Creatinine Clearance: 42.3 mL/min (A) (by C-G formula based on SCr of 1.96 mg/dL (H)).  Results from last 7 days   Lab Units 08/01/21  0544 07/31/21  2228   MAGNESIUM mg/dL 2.3 1.8         Results from last 7 days   Lab Units 08/07/21  0649 08/06/21  0613 08/05/21  0523 08/04/21  0526 08/03/21  0523   WBC 10*3/mm3 8.51 8.46 7.89 8.78 9.71   HEMOGLOBIN g/dL 7.3* 7.3* 7.2* 7.2* 7.6*   HEMATOCRIT % 23.4* 23.5* 22.9* 22.8* 24.5*   PLATELETS 10*3/mm3 291 311 288 267 260           Culture Data:   No results found for: BLOODCX  No results found for: URINECX  No results found for: RESPCX  No results found for: WOUNDCX  No results found for: STOOLCX  No components found for: BODYFLD    Radiology Data:   Imaging Results (Last 24 Hours)     ** No results found for the last 24 hours. **          I have reviewed the patient's current medications.     Assessment/Plan     Active Problems:    Cellulitis of left leg  ERIKA on CKD 4  CAD  COPD  Chronic anemia  Type 2 diabetes mellitus    Plan:  -Continue vancomycin and cefepime for now.  She  previously was on Zosyn but this was stopped due to ERIKA.   -Continue metronidazole for anaerobic coverage  -She has had very slow improvement but it does appear to be improving at this time.  For now we will continue with IV antibiotic therapy.  -Ultrasound was negative for abscess.  -Appreciate general surgery's help.  -Nephrology's been consulted given her renal disease and acute on chronic kidney disease.  Appreciate their help.  -COPD appears stable this time, continue current meds and interventions  -Anemia slightly worse but likely related to renal disease, continue to monitor for any active signs of bleeding.  IV iron ordered per Nephro.  -Continue current pain medications  -Continue current insulin dosing and glucose checks  -DVT prophylaxis with heparin  -CODE STATUS: Full      I confirmed that the patient's Advance Care Plan is present, code status is documented, or surrogate decision maker is listed in the patient's medical record.     Discharge Planning: In process.    Esau Ferguson MD

## 2021-08-07 NOTE — PROGRESS NOTES
"[facility]  [address] [place] [address]. [address]  T - [phone]  F - [phone]     Progress Note          PATIENT  DEMOGRAPHICS   PATIENT NAME: Yamileth Slater                      PHYSICIAN: BRIDGETT Gagnon  : 1960  MRN: [id number]   LOS: 4 days    Patient Care Team:  Rianna Macias MD as PCP - General (Family Medicine)  Subjective   SUBJECTIVE   Awake and alert. She does have some LE edema. No CP. Is having left le pain- edema and erythema is improving.         Objective   OBJECTIVE   Vital Signs  Temp:  [96.9 °F (36.1 °C)-97.5 °F (36.4 °C)] 97.3 °F (36.3 °C)  Heart Rate:  [59-65] 59  Resp:  [18] 18  BP: (154-183)/(78-93) 168/93    Flowsheet Rows        First Filed Value   Admission Height  167.6 cm (66\") Documented at 2021   Admission Weight  127 kg (280 lb) Documented at 2021 1853             I/O last 3 completed shifts:  In: 550 [IV Piggyback:550]  Out: 3200 [Urine:3200]    PHYSICAL EXAM    Physical Exam    RESULTS   Results Review:    Results from last 7 days   Lab Units 21  0649 21  0621  05   SODIUM mmol/L 136 132* 133*   POTASSIUM mmol/L 4.5 4.6 4.6   CHLORIDE mmol/L 101 99 102   CO2 mmol/L 26.0 23.0 22.0   BUN mg/dL 40* 39* 34*   CREATININE mg/dL 1.96* 1.97* 1.99*   CALCIUM mg/dL 8.9 9.0 9.3   GLUCOSE mg/dL 186* 278* 210*       Estimated Creatinine Clearance: 42.3 mL/min (A) (by C-G formula based on SCr of 1.96 mg/dL (H)).    Results from last 7 days   Lab Units 21  0544 21  2228   MAGNESIUM mg/dL 2.3 1.8             Results from last 7 days   Lab Units 21  0649 21  0613 21  0523 21  0526 21  05   WBC 10*3/mm3 8.51 8.46 7.89 8.78 9.71   HEMOGLOBIN g/dL 7.3* 7.3* 7.2* 7.2* 7.6*   PLATELETS 10*3/mm3 291 311 288 267 260               Imaging Results (Last 24 Hours)       ** No results found for the last 24 hours. **             MEDICATIONS    Pharmacy to dose vancomycin, , " Does not apply, Once  aspirin, 81 mg, Oral, Daily  atorvastatin, 40 mg, Oral, Daily  bumetanide, 2 mg, Intravenous, Daily  cefepime, 2 g, Intravenous, Q12H  cholecalciferol, 50,000 Units, Oral, Weekly  clopidogrel, 75 mg, Oral, Daily  docosanol, , Topical, 5x Daily  DULoxetine, 20 mg, Oral, Daily  gabapentin, 800 mg, Oral, TID  heparin (porcine), 5,000 Units, Subcutaneous, Q12H  insulin aspart, 0-24 Units, Subcutaneous, TID AC  insulin detemir, 50 Units, Subcutaneous, Q12H  isosorbide mononitrate, 30 mg, Oral, Daily  metoprolol tartrate, 100 mg, Oral, Q12H  metroNIDAZOLE, 500 mg, Intravenous, Q8H  oxybutynin XL, 5 mg, Oral, Daily  sodium bicarbonate, 1,300 mg, Oral, TID  sodium chloride, 10 mL, Intravenous, Q12H  vancomycin, 750 mg, Intravenous, Q24H      Pharmacy to Dose Cefepime,   Pharmacy to dose vancomycin,         Assessment/Plan   ASSESSMENT / PLAN      Cellulitis of left leg    1.  ERIKA on CKD 4- Baseline creatinine around 2.0, up to 2.7 peak. Now 1.9 off ivf..  tolerating vancomycin for now.  Zosyn has been discontinued and changed to cefepime. tolerated bumex well. Keep iv bumex 2mg IV daily- weight is increased 4 lbs last 24 hours.      2.  Hyponatremia- mild and has now normalized 136     3.  Left lower extremity cellulitis- on vancomycin, flagyl and cefepime- per primary team, failed previous antibiotic course. Has u/s of leg didn't show abscess. Blood culture is negative at 5 days. CRP coming down 7.0 today     4.  History of CAD     5.  History of COPD with recent copd exacerbation / pna at outside hospital      6.  Anemia- hemoglobin low - Hgb 7.3-anemia is consistent with fe def / anemia of chronic disease and on iv fe. Also has low b12 in OSH and received b12 yesterday     7. Vitamin D deficiency on vitamin d 50K weekly    8. Metabolic Acidosis- on sodium bicarb 1300mg po tid- bicarb 26- continue same dose           This document has been electronically signed by BRIDGETT Gagnon on August 7, 2021  11:07 CDT      For this patient encounter, I have reviewed the Nurse Practitioner's documentation, medical decision making, and treatment plan and personally spent time with the patient. Pt is seen on 8/7 at 1040 am

## 2021-08-08 LAB
ANION GAP SERPL CALCULATED.3IONS-SCNC: 13 MMOL/L (ref 5–15)
BASOPHILS # BLD AUTO: 0.08 10*3/MM3 (ref 0–0.2)
BASOPHILS NFR BLD AUTO: 0.9 % (ref 0–1.5)
BUN SERPL-MCNC: 43 MG/DL (ref 8–23)
BUN/CREAT SERPL: 20.5 (ref 7–25)
CALCIUM SPEC-SCNC: 8.8 MG/DL (ref 8.6–10.5)
CHLORIDE SERPL-SCNC: 97 MMOL/L (ref 98–107)
CO2 SERPL-SCNC: 21 MMOL/L (ref 22–29)
CREAT SERPL-MCNC: 2.1 MG/DL (ref 0.57–1)
CRP SERPL-MCNC: 6.47 MG/DL (ref 0–0.5)
DEPRECATED RDW RBC AUTO: 49.1 FL (ref 37–54)
EOSINOPHIL # BLD AUTO: 0.32 10*3/MM3 (ref 0–0.4)
EOSINOPHIL NFR BLD AUTO: 3.7 % (ref 0.3–6.2)
ERYTHROCYTE [DISTWIDTH] IN BLOOD BY AUTOMATED COUNT: 15.5 % (ref 12.3–15.4)
GFR SERPL CREATININE-BSD FRML MDRD: 24 ML/MIN/1.73
GLUCOSE BLDC GLUCOMTR-MCNC: 226 MG/DL (ref 70–130)
GLUCOSE BLDC GLUCOMTR-MCNC: 297 MG/DL (ref 70–130)
GLUCOSE BLDC GLUCOMTR-MCNC: 300 MG/DL (ref 70–130)
GLUCOSE BLDC GLUCOMTR-MCNC: 302 MG/DL (ref 70–130)
GLUCOSE BLDC GLUCOMTR-MCNC: 414 MG/DL (ref 70–130)
GLUCOSE SERPL-MCNC: 286 MG/DL (ref 65–99)
HCT VFR BLD AUTO: 25 % (ref 34–46.6)
HGB BLD-MCNC: 7.7 G/DL (ref 12–15.9)
IMM GRANULOCYTES # BLD AUTO: 0.13 10*3/MM3 (ref 0–0.05)
IMM GRANULOCYTES NFR BLD AUTO: 1.5 % (ref 0–0.5)
LYMPHOCYTES # BLD AUTO: 2.42 10*3/MM3 (ref 0.7–3.1)
LYMPHOCYTES NFR BLD AUTO: 28 % (ref 19.6–45.3)
MCH RBC QN AUTO: 27.4 PG (ref 26.6–33)
MCHC RBC AUTO-ENTMCNC: 30.8 G/DL (ref 31.5–35.7)
MCV RBC AUTO: 89 FL (ref 79–97)
MONOCYTES # BLD AUTO: 0.66 10*3/MM3 (ref 0.1–0.9)
MONOCYTES NFR BLD AUTO: 7.6 % (ref 5–12)
NEUTROPHILS NFR BLD AUTO: 5.03 10*3/MM3 (ref 1.7–7)
NEUTROPHILS NFR BLD AUTO: 58.3 % (ref 42.7–76)
NRBC BLD AUTO-RTO: 0 /100 WBC (ref 0–0.2)
PLATELET # BLD AUTO: 300 10*3/MM3 (ref 140–450)
PMV BLD AUTO: 8.6 FL (ref 6–12)
POTASSIUM SERPL-SCNC: 4.5 MMOL/L (ref 3.5–5.2)
RBC # BLD AUTO: 2.81 10*6/MM3 (ref 3.77–5.28)
SODIUM SERPL-SCNC: 131 MMOL/L (ref 136–145)
WBC # BLD AUTO: 8.64 10*3/MM3 (ref 3.4–10.8)

## 2021-08-08 PROCEDURE — 80048 BASIC METABOLIC PNL TOTAL CA: CPT | Performed by: FAMILY MEDICINE

## 2021-08-08 PROCEDURE — 63710000001 INSULIN DETEMIR PER 5 UNITS: Performed by: FAMILY MEDICINE

## 2021-08-08 PROCEDURE — 25010000002 HYDROMORPHONE 1 MG/ML SOLUTION: Performed by: INTERNAL MEDICINE

## 2021-08-08 PROCEDURE — 85025 COMPLETE CBC W/AUTO DIFF WBC: CPT | Performed by: FAMILY MEDICINE

## 2021-08-08 PROCEDURE — 25010000002 ONDANSETRON PER 1 MG: Performed by: INTERNAL MEDICINE

## 2021-08-08 PROCEDURE — 25010000002 VANCOMYCIN 5 G RECONSTITUTED SOLUTION: Performed by: FAMILY MEDICINE

## 2021-08-08 PROCEDURE — 86140 C-REACTIVE PROTEIN: CPT | Performed by: FAMILY MEDICINE

## 2021-08-08 PROCEDURE — 82962 GLUCOSE BLOOD TEST: CPT

## 2021-08-08 PROCEDURE — 63710000001 INSULIN ASPART PER 5 UNITS: Performed by: FAMILY MEDICINE

## 2021-08-08 PROCEDURE — 97530 THERAPEUTIC ACTIVITIES: CPT

## 2021-08-08 PROCEDURE — 25010000002 CEFEPIME PER 500 MG: Performed by: FAMILY MEDICINE

## 2021-08-08 PROCEDURE — 25010000002 HEPARIN (PORCINE) PER 1000 UNITS: Performed by: INTERNAL MEDICINE

## 2021-08-08 RX ORDER — ONDANSETRON 2 MG/ML
4 INJECTION INTRAMUSCULAR; INTRAVENOUS EVERY 6 HOURS PRN
Status: DISCONTINUED | OUTPATIENT
Start: 2021-08-08 | End: 2021-08-12 | Stop reason: HOSPADM

## 2021-08-08 RX ORDER — L. ACIDOPHILUS/L.BULGARICUS 1MM CELL
1 TABLET ORAL DAILY
Status: DISCONTINUED | OUTPATIENT
Start: 2021-08-08 | End: 2021-08-12 | Stop reason: HOSPADM

## 2021-08-08 RX ADMIN — METOPROLOL TARTRATE 100 MG: 50 TABLET, FILM COATED ORAL at 08:58

## 2021-08-08 RX ADMIN — ONDANSETRON 4 MG: 2 INJECTION INTRAMUSCULAR; INTRAVENOUS at 12:13

## 2021-08-08 RX ADMIN — CEFEPIME HYDROCHLORIDE 2 G: 2 INJECTION, POWDER, FOR SOLUTION INTRAVENOUS at 12:49

## 2021-08-08 RX ADMIN — VANCOMYCIN HYDROCHLORIDE 750 MG: 5 INJECTION, POWDER, LYOPHILIZED, FOR SOLUTION INTRAVENOUS at 11:36

## 2021-08-08 RX ADMIN — HYDROMORPHONE HYDROCHLORIDE 0.5 MG: 1 INJECTION, SOLUTION INTRAMUSCULAR; INTRAVENOUS; SUBCUTANEOUS at 08:58

## 2021-08-08 RX ADMIN — SODIUM CHLORIDE, PRESERVATIVE FREE 10 ML: 5 INJECTION INTRAVENOUS at 08:48

## 2021-08-08 RX ADMIN — METRONIDAZOLE 500 MG: 500 INJECTION, SOLUTION INTRAVENOUS at 18:30

## 2021-08-08 RX ADMIN — INSULIN DETEMIR 50 UNITS: 100 INJECTION, SOLUTION SUBCUTANEOUS at 09:00

## 2021-08-08 RX ADMIN — CLOPIDOGREL BISULFATE 75 MG: 75 TABLET ORAL at 08:47

## 2021-08-08 RX ADMIN — HEPARIN SODIUM 5000 UNITS: 5000 INJECTION INTRAVENOUS; SUBCUTANEOUS at 08:47

## 2021-08-08 RX ADMIN — ISOSORBIDE MONONITRATE 30 MG: 30 TABLET, EXTENDED RELEASE ORAL at 08:47

## 2021-08-08 RX ADMIN — DULOXETINE HYDROCHLORIDE 20 MG: 20 CAPSULE, DELAYED RELEASE ORAL at 08:46

## 2021-08-08 RX ADMIN — HYDROCODONE BITARTRATE AND ACETAMINOPHEN 1 TABLET: 5; 325 TABLET ORAL at 04:42

## 2021-08-08 RX ADMIN — METRONIDAZOLE 500 MG: 500 INJECTION, SOLUTION INTRAVENOUS at 03:43

## 2021-08-08 RX ADMIN — DOCOSANOL: 100 CREAM TOPICAL at 19:00

## 2021-08-08 RX ADMIN — GABAPENTIN 800 MG: 400 CAPSULE ORAL at 20:52

## 2021-08-08 RX ADMIN — METRONIDAZOLE 500 MG: 500 INJECTION, SOLUTION INTRAVENOUS at 11:36

## 2021-08-08 RX ADMIN — HYDROMORPHONE HYDROCHLORIDE 0.5 MG: 1 INJECTION, SOLUTION INTRAMUSCULAR; INTRAVENOUS; SUBCUTANEOUS at 18:30

## 2021-08-08 RX ADMIN — DOCOSANOL: 100 CREAM TOPICAL at 10:16

## 2021-08-08 RX ADMIN — SODIUM BICARBONATE 1300 MG: 650 TABLET ORAL at 16:36

## 2021-08-08 RX ADMIN — Medication 1 TABLET: at 16:36

## 2021-08-08 RX ADMIN — INSULIN ASPART 12 UNITS: 100 INJECTION, SOLUTION INTRAVENOUS; SUBCUTANEOUS at 11:36

## 2021-08-08 RX ADMIN — ASPIRIN 81 MG: 81 TABLET, FILM COATED ORAL at 08:46

## 2021-08-08 RX ADMIN — HEPARIN SODIUM 5000 UNITS: 5000 INJECTION INTRAVENOUS; SUBCUTANEOUS at 20:52

## 2021-08-08 RX ADMIN — INSULIN DETEMIR 50 UNITS: 100 INJECTION, SOLUTION SUBCUTANEOUS at 20:55

## 2021-08-08 RX ADMIN — SODIUM CHLORIDE, PRESERVATIVE FREE 10 ML: 5 INJECTION INTRAVENOUS at 20:53

## 2021-08-08 RX ADMIN — DOCOSANOL: 100 CREAM TOPICAL at 21:00

## 2021-08-08 RX ADMIN — METOPROLOL TARTRATE 100 MG: 50 TABLET, FILM COATED ORAL at 20:52

## 2021-08-08 RX ADMIN — GABAPENTIN 800 MG: 400 CAPSULE ORAL at 08:46

## 2021-08-08 RX ADMIN — OXYBUTYNIN CHLORIDE 5 MG: 5 TABLET, EXTENDED RELEASE ORAL at 08:47

## 2021-08-08 RX ADMIN — ATORVASTATIN CALCIUM 40 MG: 40 TABLET, FILM COATED ORAL at 08:46

## 2021-08-08 RX ADMIN — INSULIN ASPART 16 UNITS: 100 INJECTION, SOLUTION INTRAVENOUS; SUBCUTANEOUS at 08:47

## 2021-08-08 RX ADMIN — DOCOSANOL: 100 CREAM TOPICAL at 13:05

## 2021-08-08 RX ADMIN — BUMETANIDE 2 MG: 0.25 INJECTION INTRAMUSCULAR; INTRAVENOUS at 08:47

## 2021-08-08 RX ADMIN — CEFEPIME HYDROCHLORIDE 2 G: 2 INJECTION, POWDER, FOR SOLUTION INTRAVENOUS at 23:30

## 2021-08-08 RX ADMIN — SODIUM BICARBONATE 1300 MG: 650 TABLET ORAL at 20:52

## 2021-08-08 RX ADMIN — SODIUM BICARBONATE 1300 MG: 650 TABLET ORAL at 08:46

## 2021-08-08 RX ADMIN — INSULIN ASPART 8 UNITS: 100 INJECTION, SOLUTION INTRAVENOUS; SUBCUTANEOUS at 18:31

## 2021-08-08 RX ADMIN — GABAPENTIN 800 MG: 400 CAPSULE ORAL at 16:36

## 2021-08-08 NOTE — PROGRESS NOTES
"  Pharmacokinetics by Pharmacy - Vancomycin    Yamileth Slater is a 61 y.o. female  [Ht: 167.6 cm (66\"); Wt: (!) 137 kg (301 lb 6.4 oz)]    Estimated Creatinine Clearance: 39.6 mL/min (A) (by C-G formula based on SCr of 2.1 mg/dL (H)).   Creatinine   Date Value Ref Range Status   08/08/2021 2.10 (H) 0.57 - 1.00 mg/dL Final   08/07/2021 1.96 (H) 0.57 - 1.00 mg/dL Final   08/06/2021 1.97 (H) 0.57 - 1.00 mg/dL Final      Lab Results   Component Value Date    WBC 8.64 08/08/2021    WBC 8.51 08/07/2021    WBC 8.46 08/06/2021     C-Reactive Protein   Date Value Ref Range Status   08/08/2021 6.47 (H) 0.00 - 0.50 mg/dL Final   08/07/2021 7.00 (H) 0.00 - 0.50 mg/dL Final   08/06/2021 7.13 (H) 0.00 - 0.50 mg/dL Final     Lactate   Date Value Ref Range Status   07/30/2021 0.8 0.5 - 2.0 mmol/L Final   09/10/2018 1.3 0.5 - 2.0 mmol/L Final   09/10/2018 2.1 (C) 0.5 - 2.0 mmol/L Final       Temp Readings from Last 1 Encounters:   08/08/21 97.3 °F (36.3 °C) (Axillary)      Lab Results   Component Value Date    VANCOTROUGH 20.30 (H) 08/07/2021    VANCORANDOM 15.80 04/02/2021         Culture Results:  Microbiology Results (last 10 days)       Procedure Component Value - Date/Time    Blood Culture - Blood, Arm, Left [[id number]] Collected: 07/30/21 2011    Lab Status: Final result Specimen: Blood from Arm, Left Updated: 08/04/21 2045     Blood Culture No growth at 5 days    Narrative:      Plated and returned to blood culture incubator at 2200    Blood Culture - Blood, Wrist, Right [[id number]] Collected: 07/30/21 2008    Lab Status: Final result Specimen: Blood from Wrist, Right Updated: 08/04/21 2045     Blood Culture No growth at 5 days    COVID-19 and FLU A/B PCR - Swab, Nasopharynx [[id number]]  (Normal) Collected: 07/30/21 1929    Lab Status: Final result Specimen: Swab from Nasopharynx Updated: 07/30/21 2001     COVID19 Not Detected     Influenza A PCR Not Detected     Influenza B PCR Not Detected    Narrative:      Fact " sheet for providers: https://www.fda.gov/media/998302/download    Fact sheet for patients: https://www.fda.gov/media/062976/download    Test performed by PCR.          No results found for: RESPCX    Indication for use:  skin and soft tissue infection        Current Vancomycin Dose:  750 mg IVPB every 24 hours, day 9 of therapy.     Pt is also receiving Cefepime    Assessment/Plan:  Reviewed above labs and cultures.   Vancomycin trough level from 8/7 at 2010 is 20.3 (goal 10-20). Lab levels were drawn at the correct time and dose was held. WBC is 8.64. Cr is 2.1, increasing. Will give 750 mg x1 dose this am to complete consult. No further dosing needed.     Светлана Lal, PharmD   08/08/21 08:54 CDT

## 2021-08-08 NOTE — PLAN OF CARE
Goal Outcome Evaluation:           Progress: improving  Outcome Summary: pt bp has been in the higher range; prn pain meds given upon request; pt has been resting well so far this shift; will continue to monitor.

## 2021-08-08 NOTE — NURSING NOTE
Notified MD of pt fsbs being 373. Received ordered dose of 50 units of levemir. And that pt had a late supper. No other orders received

## 2021-08-08 NOTE — PROGRESS NOTES
[facility] Medicine Services  INPATIENT PROGRESS NOTE    Length of Stay: 5  Date of Admission: 7/30/2021  Primary Care Physician: Rianna Macias MD    Subjective   Chief Complaint: *Lower extremity cellulitis  HPI: Leg still hurts but improving.  No other new concerns right now.      ROS  All pertinent negatives and positives are as above. All other systems have been reviewed and are negative unless otherwise stated.     Objective    Temp:  [96.6 °F (35.9 °C)-98.4 °F (36.9 °C)] 97.5 °F (36.4 °C)  Heart Rate:  [56-65] 64  Resp:  [18] 18  BP: (122-183)/(60-88) 138/76    Physical Exam  Constitutional:       General: She is not in acute distress.     Appearance: She is not toxic-appearing.   HENT:      Head: Normocephalic and atraumatic.      Right Ear: External ear normal.      Left Ear: External ear normal.      Nose: Nose normal.      Mouth/Throat:      Mouth: Mucous membranes are moist.      Pharynx: Oropharynx is clear.   Eyes:      Conjunctiva/sclera: Conjunctivae normal.   Cardiovascular:      Rate and Rhythm: Normal rate and regular rhythm.      Pulses: Normal pulses.      Heart sounds: Normal heart sounds.   Pulmonary:      Effort: Pulmonary effort is normal. No respiratory distress.      Breath sounds: Normal breath sounds.   Abdominal:      General: Bowel sounds are normal.      Palpations: Abdomen is soft.      Tenderness: There is no abdominal tenderness.   Musculoskeletal:         General: Swelling present.      Cervical back: Neck supple.   Skin:     General: Skin is warm.      Capillary Refill: Capillary refill takes less than 2 seconds.      Findings: Erythema present.      Comments: LLE induration, noted with erythema consistent with cellulitis. Slightly improved today   Neurological:      Mental Status: She is alert.   Psychiatric:         Mood and Affect: Mood normal.         Behavior: Behavior normal.          Results Review:  I have reviewed the labs,  radiology results, and diagnostic studies.    Laboratory Data:   Results from last 7 days   Lab Units 08/08/21 0453 08/07/21 2010 08/07/21  0649 08/06/21  0613 08/06/21 0613   SODIUM mmol/L 131*  --  136  --  132*   POTASSIUM mmol/L 4.5  --  4.5  --  4.6   CHLORIDE mmol/L 97*  --  101  --  99   CO2 mmol/L 21.0*  --  26.0  --  23.0   BUN mg/dL 43*  --  40*  --  39*   CREATININE mg/dL 2.10*  --  1.96*  --  1.97*   GLUCOSE mg/dL 286* 373* 186*   < > 278*   CALCIUM mg/dL 8.8  --  8.9  --  9.0   ANION GAP mmol/L 13.0  --  9.0  --  10.0    < > = values in this interval not displayed.     Estimated Creatinine Clearance: 39.6 mL/min (A) (by C-G formula based on SCr of 2.1 mg/dL (H)).          Results from last 7 days   Lab Units 08/08/21 0453 08/07/21 0649 08/06/21 0613 08/05/21  0523 08/04/21  0526   WBC 10*3/mm3 8.64 8.51 8.46 7.89 8.78   HEMOGLOBIN g/dL 7.7* 7.3* 7.3* 7.2* 7.2*   HEMATOCRIT % 25.0* 23.4* 23.5* 22.9* 22.8*   PLATELETS 10*3/mm3 300 291 311 288 267           Culture Data:   No results found for: BLOODCX  No results found for: URINECX  No results found for: RESPCX  No results found for: WOUNDCX  No results found for: STOOLCX  No components found for: BODYFLD    Radiology Data:   Imaging Results (Last 24 Hours)     ** No results found for the last 24 hours. **          I have reviewed the patient's current medications.     Assessment/Plan     Active Hospital Problems    Diagnosis    • Cellulitis of left leg        Plan:      -Continue vancomycin and cefepime for now.  She previously was on Zosyn but this was stopped due to REIKA.   -Continue metronidazole for anaerobic coverage  -She has had very slow improvement but it does appear to be improving at this time.  For now we will continue with IV antibiotic therapy.  -Ultrasound was negative for abscess.  -Appreciate general surgery's help.  -Nephrology's been consulted given her renal disease and acute on chronic kidney disease.  Appreciate their  help.  -COPD appears stable this time, continue current meds and interventions  -Anemia slightly worse but likely related to renal disease, continue to monitor for any active signs of bleeding.  IV iron ordered per Nephro.  -Continue current pain medications  -Continue current insulin dosing and glucose checks  -DVT prophylaxis with heparin  -CODE STATUS: Full

## 2021-08-08 NOTE — PROGRESS NOTES
"[facility]  [address] [place] [address]. [address]  T - [phone]  F - [phone]     Progress Note          PATIENT  DEMOGRAPHICS   PATIENT NAME: Yamileth Slater                      PHYSICIAN: BRIDGETT Gagnon  : 1960  MRN: [id number]   LOS: 5 days    Patient Care Team:  Rianna Macias MD as PCP - General (Family Medicine)  Subjective   SUBJECTIVE   No acute events overnight. She does report nausea and vomiting x 1 this morning. No fever or chills. No diarrhea. L>R LE edema.         Objective   OBJECTIVE   Vital Signs  Temp:  [96.6 °F (35.9 °C)-98.4 °F (36.9 °C)] 97.5 °F (36.4 °C)  Heart Rate:  [56-65] 64  Resp:  [18] 18  BP: (122-183)/(60-88) 138/76    Flowsheet Rows      First Filed Value   Admission Height  167.6 cm (66\") Documented at 2021   Admission Weight  127 kg (280 lb) Documented at 2021           I/O last 3 completed shifts:  In: 240 [P.O.:240]  Out: 3100 [Urine:3100]    PHYSICAL EXAM    Physical Exam  Constitutional:       General: She is not in acute distress.     Appearance: She is obese. She is ill-appearing.   HENT:      Head: Normocephalic and atraumatic.   Cardiovascular:      Rate and Rhythm: Normal rate and regular rhythm.   Pulmonary:      Effort: Pulmonary effort is normal.      Breath sounds: Normal breath sounds.   Abdominal:      General: Bowel sounds are normal. There is distension.   Musculoskeletal:      Right lower leg: Edema present.      Left lower leg: Edema present.   Skin:     General: Skin is warm and dry.      Coloration: Skin is pale.   Neurological:      General: No focal deficit present.      Mental Status: She is alert and oriented to person, place, and time.         RESULTS   Results Review:    Results from last 7 days   Lab Units 21  0453 21  0649 21  0613 21  0613   SODIUM mmol/L 131*  --  136  --  132*   POTASSIUM mmol/L 4.5  --  4.5  --  4.6   CHLORIDE mmol/L " 97*  --  101  --  99   CO2 mmol/L 21.0*  --  26.0  --  23.0   BUN mg/dL 43*  --  40*  --  39*   CREATININE mg/dL 2.10*  --  1.96*  --  1.97*   CALCIUM mg/dL 8.8  --  8.9  --  9.0   GLUCOSE mg/dL 286* 373* 186*   < > 278*    < > = values in this interval not displayed.       Estimated Creatinine Clearance: 39.6 mL/min (A) (by C-G formula based on SCr of 2.1 mg/dL (H)).                Results from last 7 days   Lab Units 08/08/21  0453 08/07/21  0649 08/06/21  0613 08/05/21  0523 08/04/21  0526   WBC 10*3/mm3 8.64 8.51 8.46 7.89 8.78   HEMOGLOBIN g/dL 7.7* 7.3* 7.3* 7.2* 7.2*   PLATELETS 10*3/mm3 300 291 311 288 267               Imaging Results (Last 24 Hours)     ** No results found for the last 24 hours. **           MEDICATIONS    aspirin, 81 mg, Oral, Daily  atorvastatin, 40 mg, Oral, Daily  bumetanide, 2 mg, Intravenous, Daily  cefepime, 2 g, Intravenous, Q12H  cholecalciferol, 50,000 Units, Oral, Weekly  clopidogrel, 75 mg, Oral, Daily  docosanol, , Topical, 5x Daily  DULoxetine, 20 mg, Oral, Daily  gabapentin, 800 mg, Oral, TID  heparin (porcine), 5,000 Units, Subcutaneous, Q12H  insulin aspart, 0-24 Units, Subcutaneous, TID AC  insulin detemir, 50 Units, Subcutaneous, Q12H  isosorbide mononitrate, 30 mg, Oral, Daily  metoprolol tartrate, 100 mg, Oral, Q12H  metroNIDAZOLE, 500 mg, Intravenous, Q8H  oxybutynin XL, 5 mg, Oral, Daily  sodium bicarbonate, 1,300 mg, Oral, TID  sodium chloride, 10 mL, Intravenous, Q12H  vancomycin, 750 mg, Intravenous, Once      Pharmacy to Dose Cefepime,   Pharmacy to dose vancomycin,         Assessment/Plan   ASSESSMENT / PLAN      Cellulitis of left leg    1.  ERIKA on CKD 4- Baseline creatinine around 2.0, up to 2.7 peak. Now 2.1mg/dL with   gfr 24. off ivf.  tolerating vancomycin for now.  Zosyn has been discontinued and changed to cefepime. tolerating bumex well. Keep iv bumex 2mg IV daily- weight is increased 1lb last 24 hours. UOP 1000cc last 24 hours     2.   Hyponatremia- sodium now 131- fluid restriction 1000cc/day. Vomiting earlier today     3.  Left lower extremity cellulitis- on vancomycin, flagyl and cefepime- per primary team, failed previous antibiotic course. Has u/s of leg didn't show abscess. Blood culture is negative at 5 days. CRP coming down 6.47- report pain LLE     4.  History of CAD     5.  History of COPD with recent copd exacerbation / pna at outside hospital      6.  Anemia- hemoglobin low - Hgb 7.7-anemia is consistent with fe def / anemia of chronic disease and on iv fe. Also has low b12 in OSH and received b12 yesterday     7. Vitamin D deficiency on vitamin d 50K weekly     8. Metabolic Acidosis- on sodium bicarb 1300mg po tid- bicarb 21- continue same dose           This document has been electronically signed by BRIDGETT Gagnon on August 8, 2021 12:22 CDT

## 2021-08-08 NOTE — PLAN OF CARE
Goal Outcome Evaluation:  Plan of Care Reviewed With: patient        Progress: improving  Outcome Summary: Pt c/o N/V today. She declined this am once checked on due to N/V and req check back later. Pt agreeable for tx this pm to assist her to shower for a shower with nsg. Pt t/f sup-sit with SBAx1. Sit-sytand-sit with SBAx1. Pt amb 8'with SBAx1 with FWRW.

## 2021-08-08 NOTE — THERAPY TREATMENT NOTE
Acute Care - Physical Therapy Treatment Note  [facility]     Patient Name: Yamileth Slater  : 1960  MRN: [id number]  Today's Date: 2021      Visit Dx:     ICD-10-CM ICD-9-CM   1. Cellulitis of left leg  L03.116 682.6   2. Anemia, unspecified type  D64.9 285.9   3. Chronic kidney disease, unspecified CKD stage  N18.9 585.9   4. Type 2 diabetes mellitus with hyperglycemia, unspecified whether long term insulin use (CMS/[facility])  E11.65 250.00   5. Impaired mobility and ADLs  Z74.09 V49.89    Z78.9      Patient Active Problem List   Diagnosis   • Type 2 diabetes mellitus with diabetic polyneuropathy, with long-term current use of insulin (CMS/[facility])   • Chronic obstructive pulmonary disease (CMS/[facility])   • Chronic midline low back pain without sciatica   • Vitamin D deficiency   • Type 2 diabetes mellitus (CMS/[facility])   • Tobacco dependence syndrome   • Surgical follow-up care   • Shoulder joint pain   • Peripheral vascular disease (CMS/[facility])   • Pain   • Neurologic disorder associated with diabetes mellitus (CMS/[facility])   • Morbid obesity with BMI of 50.0-59.9, adult (CMS/[facility])   • Mixed hyperlipidemia   • Kidney stone   • History of colon polyps   • GERD (gastroesophageal reflux disease)   • Excoriated eczema   • Essential hypertension   • Encounter for medication refill   • Dyslipidemia   • Diabetes mellitus (CMS/[facility])   • Coronary artery disease   • Chronic obstructive lung disease (CMS/[facility])   • Chronic folliculitis   • Chest pain   • Mild persistent asthma   • 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   • Uncontrolled type 2 diabetes mellitus with complication, with long-term current use of insulin (CMS/[facility])   • Anemia   • Hypothyroidism   • Carotid artery disease (CMS/[facility])   • Current smoker   • DM type 2 with diabetic peripheral neuropathy (CMS/[facility])   • [place] abuse   • PAD (peripheral artery disease) (CMS/[facility])   • S/P CABG x 3   • Intercostal pain   • Venous insufficiency   • Dyspnea on  exertion   • LAFB (left anterior fascicular block)   • Pulmonary hypertension (CMS/[facility])   • Left hip pain   • Neck pain   • Acute kidney injury superimposed on chronic kidney disease (CMS/[facility])   • MIKE and COPD overlap syndrome (CMS/[facility])   • Pneumonia due to COVID-19 virus   • CKD (chronic kidney disease) stage 4, GFR 15-29 ml/min (CMS/[facility])   • Morbid obesity (CMS/[facility])   • Type 2 diabetes mellitus with hyperglycemia, with long-term current use of insulin (CMS/[facility])   • Hyponatremia   • Hyperkalemia   • Anemia   • Cutaneous candidiasis   • Community acquired pneumonia of right middle lobe of lung   • MRSA infection   • Alkaline phosphatase elevation   • COVID-19 ruled out by laboratory testing   • Esophageal dysphagia   • Monilial esophagitis (CMS/[facility])   • NSTEMI, initial episode of care (CMS/[facility])   • CAD (coronary artery disease)   • Gastrointestinal hemorrhage   • Chronic respiratory failure with hypoxia (CMS/[facility])   • Pneumonia due to infectious organism   • Chronic hypercapnic respiratory failure (CMS/[facility])   • Cellulitis of left leg     Past Medical History:   Diagnosis Date   • Anxiety    • Carotid artery stenosis    • Chronic obstructive lung disease (CMS/[facility])    • CKD (chronic kidney disease) stage 4, GFR 15-29 ml/min (CMS/[facility])    • Colonic polyp    • Coronary arteriosclerosis    • Diabetes mellitus (CMS/[facility])    • Diabetic neuropathy (CMS/[facility])    • GERD (gastroesophageal reflux disease)    • History of transfusion    • Hypercholesterolemia    • Hypertension    • Hypomagnesemia 6/27/2021    Monitor and replace   • Morbid obesity (CMS/[facility])    • Nephrolithiasis    • Peripheral vascular disease (CMS/[facility])    • Sleep apnea    • Substance abuse (CMS/[facility])    • Vitamin D deficiency      Past Surgical History:   Procedure Laterality Date   • CARDIAC CATHETERIZATION N/A 7/14/2020   • CARDIAC CATHETERIZATION N/A 4/23/2021    Procedure: Left Heart Cath;  Surgeon: Melba Romo MD;  Location: [facility] CATH INVASIVE  LOCATION;  Service: Cardiology;  Laterality: N/A;   • CARDIAC CATHETERIZATION N/A 4/30/2021    Procedure: Percutaneous Coronary Intervention;  Surgeon: Russell Voss MD;  Location: [facility] CATH INVASIVE LOCATION;  Service: Cardiovascular;  Laterality: N/A;   • CARDIAC CATHETERIZATION N/A 4/30/2021    Procedure: Stent NIKKI coronary;  Surgeon: Russell Voss MD;  [facility];  Service: Cardiovascular;  Laterality: N/A;   • CAROTID STENT Left    • COLONOSCOPY     • COLONOSCOPY N/A 5/14/2021    Procedure: COLONOSCOPY;  Surgeon: Mingo Duarte MD;  Location: [facility] ENDOSCOPY;  Service: Gastroenterology;  Laterality: N/A;   • CORONARY ARTERY BYPASS GRAFT N/A 2013    CABG X 3   • CYSTOSCOPY BLADDER STONE LITHOTRIPSY Bilateral    • ENDOSCOPY N/A 4/12/2021    Procedure: ESOPHAGOGASTRODUODENOSCOPY;  Surgeon: Mingo Duarte MD;  Location: [facility] ENDOSCOPY;  Service: Gastroenterology;  Laterality: N/A;   • ENDOSCOPY N/A 5/14/2021    Procedure: ESOPHAGOGASTRODUODENOSCOPY;  Surgeon: Mingo Duarte MD;  Location: [facility] ENDOSCOPY;  Service: Gastroenterology;  Laterality: N/A;        PT Assessment (last 12 hours)      PT Evaluation and Treatment     Row Name 08/08/21 1514          Physical Therapy Time and Intention    Subjective Information  no complaints  -EM     Document Type  therapy note (daily note)  -EM     Mode of Treatment  individual therapy;physical therapy  -EM     Patient Effort  good  -EM     Comment  Pt agreeable for PT to assist her to bathroom to take shower with CNA.  -EM     Row Name 08/08/21 1514          Cognition    Affect/Mental Status (Cognitive)  WFL  -EM     Orientation Status (Cognition)  oriented x 4  -EM     Follows Commands (Cognition)  WFL  -EM     Personal Safety Interventions  fall prevention program maintained;gait belt;supervised activity;nonskid shoes/slippers when out of bed  -EM     Row Name 08/08/21 1514          Pain Scale: Numbers  Pre/Post-Treatment    Pretreatment Pain Rating  6/10  -EM     Posttreatment Pain Rating  9/10  -EM     Pain Location - Side  Left  -EM     Pain Location - Orientation  lower  -EM     Pain Location  extremity  -EM     Row Name 08/08/21 1514          Bed Mobility    Supine-Sit [place] (Bed Mobility)  supervision  -EM     Row Name 08/08/21 1514          Transfers    Sit-Stand [place] (Transfers)  supervision  -EM     Stand-Sit [place] (Transfers)  supervision  -EM     Row Name 08/08/21 1514          Sit-Stand Transfer    Assistive Device (Sit-Stand Transfers)  walker, front-wheeled  -EM     Row Name 08/08/21 1514          Stand-Sit Transfer    Assistive Device (Stand-Sit Transfers)  walker, front-wheeled  -EM     Row Name 08/08/21 1514          Gait/Stairs (Locomotion)    [place] Level (Gait)  supervision  -EM     Assistive Device (Gait)  walker, front-wheeled  -EM     Distance in Feet (Gait)  8'  -EM     Pattern (Gait)  step-through  -EM     Row Name 08/08/21 1514          Vital Signs    Pre Systolic BP Rehab  194 manual-nsg aware  -EM     Pre Treatment Diastolic BP  80  -EM     Pretreatment Heart Rate (beats/min)  67  -EM     Pre SpO2 (%)  98  -EM     O2 Delivery Pre Treatment  supplemental O2  -EM     Pre Patient Position  Supine  -EM     Intra Patient Position  Sitting  -EM     Post Patient Position  Sitting  -EM     Row Name 08/08/21 1514          Bed Mobility Goal 1 (PT)    Activity/Assistive Device (Bed Mobility Goal 1, PT)  sit to supine;supine to sit  -EM     [place] Level/Cues Needed (Bed Mobility Goal 1, PT)  independent  -EM     Time Frame (Bed Mobility Goal 1, PT)  by discharge  -EM     Progress/Outcomes (Bed Mobility Goal 1, PT)  goal not met  -EM     Row Name 08/08/21 1514          Transfer Goal 1 (PT)    Activity/Assistive Device (Transfer Goal 1, PT)  sit-to-stand/stand-to-sit;bed-to-chair/chair-to-bed  -EM     [place] Level/Cues Needed (Transfer Goal 1, PT)  modified  independence  -EM     Time Frame (Transfer Goal 1, PT)  by discharge  -EM     Progress/Outcome (Transfer Goal 1, PT)  goal not met  -EM     Row Name 08/08/21 1514          Gait Training Goal 1 (PT)    Activity/Assistive Device (Gait Training Goal 1, PT)  gait (walking locomotion);assistive device use;backward stepping  -EM     [place] Level (Gait Training Goal 1, PT)  modified independence  -EM     Distance (Gait Training Goal 1, PT)  25'x2  -EM     Time Frame (Gait Training Goal 1, PT)  by discharge  -EM     Progress/Outcome (Gait Training Goal 1, PT)  goal not met  -EM     Row Name 08/08/21 1514          Positioning and Restraints    Pre-Treatment Position  in bed  -EM     Post Treatment Position  bathroom to give shower with CNA present  -EM     Bathroom  sitting;encouraged to call for assist;with nsg  -EM       User Key  (r) = Recorded By, (t) = Taken By, (c) = Cosigned By    Initials Name Provider Type    EM Nadeem Patricia, RENETTA Physical Therapy Assistant        Physical Therapy Education                 Title: PT OT SLP Therapies (Done)     Topic: Physical Therapy (Done)     Point: Mobility training (Done)     Learning Progress Summary           Patient Acceptance, E,TB, VU by LW at 8/6/2021 1259    Acceptance, E,TB, VU by LR at 8/4/2021 0821    Comment: Educated on PT POC and goals.                   Point: Home exercise program (Done)     Learning Progress Summary           Patient Acceptance, E,TB, VU by LW at 8/6/2021 1259    Acceptance, E,TB, VU by LR at 8/4/2021 0821    Comment: Educated on PT POC and goals.                   Point: Body mechanics (Done)     Learning Progress Summary           Patient Acceptance, E,TB, VU by LW at 8/6/2021 1259    Acceptance, E,TB, VU by LR at 8/4/2021 0821    Comment: Educated on PT POC and goals.                   Point: Precautions (Done)     Learning Progress Summary           Patient Acceptance, E,TB, VU by LW at 8/6/2021 1259    Acceptance, E,TB, VU by LR at  8/4/2021 0821    Comment: Educated on PT POC and goals.    Acceptance, E,TB, VU by SC at 7/31/2021 1007                               User Key     Initials Effective Dates Name Provider Type Discipline    LW 06/16/21 -  Rekha Perez COTA/L Occupational Therapy Assistant OT    LR 06/16/21 -  Lucien Gilman Physical Therapist PT    SC 07/21/21 -  Angela West RN Registered Nurse Nurse              PT Recommendation and Plan  Anticipated Discharge Disposition (PT): home with 24/7 care, home with home health, skilled nursing facility  Plan of Care Reviewed With: patient  Progress: improving  Outcome Summary: Pt c/o N/V today. She declined this am once checked on due to N/V and req check back later. Pt agreeable for tx this pm to assist her to shower for a shower with nsg. Pt t/f sup-sit with SBAx1. Sit-sytand-sit with SBAx1. Pt amb 8'with SBAx1 with FWRW.  Outcome Measures     Row Name 08/08/21 1514 08/07/21 1300          How much help from another person do you currently need...    Turning from your back to your side while in flat bed without using bedrails?  3  -EM  3  -EM     Moving from lying on back to sitting on the side of a flat bed without bedrails?  3  -EM  3  -EM     Moving to and from a bed to a chair (including a wheelchair)?  3  -EM  3  -EM     Standing up from a chair using your arms (e.g., wheelchair, bedside chair)?  3  -EM  3  -EM     Climbing 3-5 steps with a railing?  3  -EM  3  -EM     To walk in hospital room?  3  -EM  3  -EM     AM-PAC 6 Clicks Score (PT)  18  -EM  18  -EM        Functional Assessment    Outcome Measure Options  AM-PAC 6 Clicks Basic Mobility (PT)  -EM  AM-PAC 6 Clicks Basic Mobility (PT)  -EM       User Key  (r) = Recorded By, (t) = Taken By, (c) = Cosigned By    Initials Name Provider Type    EM Nadeem Patricia, PTA Physical Therapy Assistant           Time Calculation:   PT Charges     Row Name 08/08/21 1631             Time Calculation    Start Time  1514  -EM       Stop Time  1526  -EM      Time Calculation (min)  12 min  -EM         Time Calculation- PT    Total Timed Code Minutes- PT  12 minute(s)  -EM        User Key  (r) = Recorded By, (t) = Taken By, (c) = Cosigned By    Initials Name Provider Type    EM Nadeem Patricia PTA Physical Therapy Assistant        Therapy Charges for Today     Code Description Service Date Service Provider Modifiers Qty    73604776268 HC PT THER PROC EA 15 MIN 8/7/2021 Nadeem Patricia, RENETTA GP 1    64801966814 HC GAIT TRAINING EA 15 MIN 8/7/2021 Nadeem Patricia, RENETTA GP 1    22858676527 HC PT THERAPEUTIC ACT EA 15 MIN 8/8/2021 Nadeem Patricia, RENETTA GP 1          PT G-Codes  Outcome Measure Options: AM-PAC 6 Clicks Basic Mobility (PT)  AM-PAC 6 Clicks Score (PT): 18  AM-PAC 6 Clicks Score (OT): 18    Nadeem Patricia PTA  8/8/2021

## 2021-08-09 LAB
ANION GAP SERPL CALCULATED.3IONS-SCNC: 7 MMOL/L (ref 5–15)
BASOPHILS # BLD AUTO: 0.06 10*3/MM3 (ref 0–0.2)
BASOPHILS NFR BLD AUTO: 0.7 % (ref 0–1.5)
BUN SERPL-MCNC: 42 MG/DL (ref 8–23)
BUN/CREAT SERPL: 20.8 (ref 7–25)
CALCIUM SPEC-SCNC: 8.6 MG/DL (ref 8.6–10.5)
CHLORIDE SERPL-SCNC: 98 MMOL/L (ref 98–107)
CO2 SERPL-SCNC: 27 MMOL/L (ref 22–29)
CREAT SERPL-MCNC: 2.02 MG/DL (ref 0.57–1)
CRP SERPL-MCNC: 4.79 MG/DL (ref 0–0.5)
DEPRECATED RDW RBC AUTO: 49.4 FL (ref 37–54)
EOSINOPHIL # BLD AUTO: 0.33 10*3/MM3 (ref 0–0.4)
EOSINOPHIL NFR BLD AUTO: 3.9 % (ref 0.3–6.2)
ERYTHROCYTE [DISTWIDTH] IN BLOOD BY AUTOMATED COUNT: 15.6 % (ref 12.3–15.4)
GFR SERPL CREATININE-BSD FRML MDRD: 25 ML/MIN/1.73
GLUCOSE BLDC GLUCOMTR-MCNC: 265 MG/DL (ref 70–130)
GLUCOSE BLDC GLUCOMTR-MCNC: 337 MG/DL (ref 70–130)
GLUCOSE BLDC GLUCOMTR-MCNC: 359 MG/DL (ref 70–130)
GLUCOSE BLDC GLUCOMTR-MCNC: 360 MG/DL (ref 70–130)
GLUCOSE SERPL-MCNC: 260 MG/DL (ref 65–99)
HCT VFR BLD AUTO: 25 % (ref 34–46.6)
HGB BLD-MCNC: 7.7 G/DL (ref 12–15.9)
IMM GRANULOCYTES # BLD AUTO: 0.14 10*3/MM3 (ref 0–0.05)
IMM GRANULOCYTES NFR BLD AUTO: 1.7 % (ref 0–0.5)
LYMPHOCYTES # BLD AUTO: 2.41 10*3/MM3 (ref 0.7–3.1)
LYMPHOCYTES NFR BLD AUTO: 28.7 % (ref 19.6–45.3)
MCH RBC QN AUTO: 27.5 PG (ref 26.6–33)
MCHC RBC AUTO-ENTMCNC: 30.8 G/DL (ref 31.5–35.7)
MCV RBC AUTO: 89.3 FL (ref 79–97)
MONOCYTES # BLD AUTO: 0.6 10*3/MM3 (ref 0.1–0.9)
MONOCYTES NFR BLD AUTO: 7.1 % (ref 5–12)
NEUTROPHILS NFR BLD AUTO: 4.86 10*3/MM3 (ref 1.7–7)
NEUTROPHILS NFR BLD AUTO: 57.9 % (ref 42.7–76)
NRBC BLD AUTO-RTO: 0 /100 WBC (ref 0–0.2)
PLATELET # BLD AUTO: 295 10*3/MM3 (ref 140–450)
PMV BLD AUTO: 8.8 FL (ref 6–12)
POTASSIUM SERPL-SCNC: 4.1 MMOL/L (ref 3.5–5.2)
RBC # BLD AUTO: 2.8 10*6/MM3 (ref 3.77–5.28)
SODIUM SERPL-SCNC: 132 MMOL/L (ref 136–145)
WBC # BLD AUTO: 8.4 10*3/MM3 (ref 3.4–10.8)

## 2021-08-09 PROCEDURE — 25010000002 ONDANSETRON PER 1 MG: Performed by: INTERNAL MEDICINE

## 2021-08-09 PROCEDURE — 85025 COMPLETE CBC W/AUTO DIFF WBC: CPT | Performed by: FAMILY MEDICINE

## 2021-08-09 PROCEDURE — 25010000002 HYDROMORPHONE 1 MG/ML SOLUTION: Performed by: INTERNAL MEDICINE

## 2021-08-09 PROCEDURE — 25010000002 CEFEPIME PER 500 MG: Performed by: FAMILY MEDICINE

## 2021-08-09 PROCEDURE — 82962 GLUCOSE BLOOD TEST: CPT

## 2021-08-09 PROCEDURE — 97535 SELF CARE MNGMENT TRAINING: CPT

## 2021-08-09 PROCEDURE — 80048 BASIC METABOLIC PNL TOTAL CA: CPT | Performed by: FAMILY MEDICINE

## 2021-08-09 PROCEDURE — 86140 C-REACTIVE PROTEIN: CPT | Performed by: FAMILY MEDICINE

## 2021-08-09 PROCEDURE — 63710000001 INSULIN DETEMIR PER 5 UNITS: Performed by: INTERNAL MEDICINE

## 2021-08-09 PROCEDURE — 63710000001 INSULIN DETEMIR PER 5 UNITS: Performed by: FAMILY MEDICINE

## 2021-08-09 PROCEDURE — 25010000002 HEPARIN (PORCINE) PER 1000 UNITS: Performed by: INTERNAL MEDICINE

## 2021-08-09 PROCEDURE — 97530 THERAPEUTIC ACTIVITIES: CPT

## 2021-08-09 PROCEDURE — 63710000001 INSULIN ASPART PER 5 UNITS: Performed by: FAMILY MEDICINE

## 2021-08-09 PROCEDURE — 97110 THERAPEUTIC EXERCISES: CPT

## 2021-08-09 RX ORDER — SODIUM BICARBONATE 650 MG/1
650 TABLET ORAL 3 TIMES DAILY
Status: DISCONTINUED | OUTPATIENT
Start: 2021-08-09 | End: 2021-08-10

## 2021-08-09 RX ORDER — BUMETANIDE 0.25 MG/ML
2 INJECTION INTRAMUSCULAR; INTRAVENOUS 2 TIMES DAILY
Status: DISCONTINUED | OUTPATIENT
Start: 2021-08-09 | End: 2021-08-11

## 2021-08-09 RX ADMIN — INSULIN ASPART 12 UNITS: 100 INJECTION, SOLUTION INTRAVENOUS; SUBCUTANEOUS at 07:54

## 2021-08-09 RX ADMIN — INSULIN DETEMIR 50 UNITS: 100 INJECTION, SOLUTION SUBCUTANEOUS at 08:55

## 2021-08-09 RX ADMIN — CEFEPIME HYDROCHLORIDE 2 G: 2 INJECTION, POWDER, FOR SOLUTION INTRAVENOUS at 10:49

## 2021-08-09 RX ADMIN — HYDROMORPHONE HYDROCHLORIDE 0.5 MG: 1 INJECTION, SOLUTION INTRAMUSCULAR; INTRAVENOUS; SUBCUTANEOUS at 08:53

## 2021-08-09 RX ADMIN — CLOPIDOGREL BISULFATE 75 MG: 75 TABLET ORAL at 08:53

## 2021-08-09 RX ADMIN — ATORVASTATIN CALCIUM 40 MG: 40 TABLET, FILM COATED ORAL at 08:53

## 2021-08-09 RX ADMIN — DOCOSANOL 1 APPLICATION: 100 CREAM TOPICAL at 14:21

## 2021-08-09 RX ADMIN — INSULIN ASPART 16 UNITS: 100 INJECTION, SOLUTION INTRAVENOUS; SUBCUTANEOUS at 10:48

## 2021-08-09 RX ADMIN — ISOSORBIDE MONONITRATE 30 MG: 30 TABLET, EXTENDED RELEASE ORAL at 08:54

## 2021-08-09 RX ADMIN — METRONIDAZOLE 500 MG: 500 INJECTION, SOLUTION INTRAVENOUS at 20:19

## 2021-08-09 RX ADMIN — SODIUM BICARBONATE 1300 MG: 650 TABLET ORAL at 08:54

## 2021-08-09 RX ADMIN — Medication 1 TABLET: at 08:54

## 2021-08-09 RX ADMIN — HEPARIN SODIUM 5000 UNITS: 5000 INJECTION INTRAVENOUS; SUBCUTANEOUS at 20:29

## 2021-08-09 RX ADMIN — BUMETANIDE 2 MG: 0.25 INJECTION INTRAMUSCULAR; INTRAVENOUS at 17:17

## 2021-08-09 RX ADMIN — SODIUM BICARBONATE 650 MG: 650 TABLET ORAL at 20:31

## 2021-08-09 RX ADMIN — HEPARIN SODIUM 5000 UNITS: 5000 INJECTION INTRAVENOUS; SUBCUTANEOUS at 08:54

## 2021-08-09 RX ADMIN — DOCOSANOL 1 APPLICATION: 100 CREAM TOPICAL at 07:29

## 2021-08-09 RX ADMIN — ONDANSETRON 4 MG: 2 INJECTION INTRAMUSCULAR; INTRAVENOUS at 12:09

## 2021-08-09 RX ADMIN — GABAPENTIN 800 MG: 400 CAPSULE ORAL at 08:53

## 2021-08-09 RX ADMIN — METOPROLOL TARTRATE 100 MG: 50 TABLET, FILM COATED ORAL at 08:54

## 2021-08-09 RX ADMIN — OXYBUTYNIN CHLORIDE 5 MG: 5 TABLET, EXTENDED RELEASE ORAL at 08:53

## 2021-08-09 RX ADMIN — SODIUM BICARBONATE 650 MG: 650 TABLET ORAL at 17:17

## 2021-08-09 RX ADMIN — GABAPENTIN 800 MG: 400 CAPSULE ORAL at 17:17

## 2021-08-09 RX ADMIN — ASPIRIN 81 MG: 81 TABLET, FILM COATED ORAL at 08:54

## 2021-08-09 RX ADMIN — SODIUM CHLORIDE, PRESERVATIVE FREE 10 ML: 5 INJECTION INTRAVENOUS at 20:30

## 2021-08-09 RX ADMIN — HYDROMORPHONE HYDROCHLORIDE 0.5 MG: 1 INJECTION, SOLUTION INTRAMUSCULAR; INTRAVENOUS; SUBCUTANEOUS at 17:34

## 2021-08-09 RX ADMIN — SODIUM CHLORIDE, PRESERVATIVE FREE 10 ML: 5 INJECTION INTRAVENOUS at 08:56

## 2021-08-09 RX ADMIN — METOPROLOL TARTRATE 100 MG: 50 TABLET, FILM COATED ORAL at 20:30

## 2021-08-09 RX ADMIN — DOCOSANOL: 100 CREAM TOPICAL at 22:18

## 2021-08-09 RX ADMIN — HYDROMORPHONE HYDROCHLORIDE 0.25 MG: 1 INJECTION, SOLUTION INTRAMUSCULAR; INTRAVENOUS; SUBCUTANEOUS at 22:15

## 2021-08-09 RX ADMIN — BUMETANIDE 2 MG: 0.25 INJECTION INTRAMUSCULAR; INTRAVENOUS at 08:54

## 2021-08-09 RX ADMIN — DULOXETINE HYDROCHLORIDE 20 MG: 20 CAPSULE, DELAYED RELEASE ORAL at 08:54

## 2021-08-09 RX ADMIN — GABAPENTIN 800 MG: 400 CAPSULE ORAL at 20:29

## 2021-08-09 RX ADMIN — DOCOSANOL: 100 CREAM TOPICAL at 17:33

## 2021-08-09 RX ADMIN — INSULIN ASPART 16 UNITS: 100 INJECTION, SOLUTION INTRAVENOUS; SUBCUTANEOUS at 17:34

## 2021-08-09 RX ADMIN — INSULIN DETEMIR 55 UNITS: 100 INJECTION, SOLUTION SUBCUTANEOUS at 22:17

## 2021-08-09 RX ADMIN — METRONIDAZOLE 500 MG: 500 INJECTION, SOLUTION INTRAVENOUS at 10:49

## 2021-08-09 RX ADMIN — METRONIDAZOLE 500 MG: 500 INJECTION, SOLUTION INTRAVENOUS at 03:58

## 2021-08-09 RX ADMIN — DOCOSANOL: 100 CREAM TOPICAL at 10:49

## 2021-08-09 NOTE — THERAPY TREATMENT NOTE
Acute Care - Physical Therapy Treatment Note  [facility]     Patient Name: Yamileth Slater  : 1960  MRN: [id number]  Today's Date: 2021      Visit Dx:     ICD-10-CM ICD-9-CM   1. Cellulitis of left leg  L03.116 682.6   2. Anemia, unspecified type  D64.9 285.9   3. Chronic kidney disease, unspecified CKD stage  N18.9 585.9   4. Type 2 diabetes mellitus with hyperglycemia, unspecified whether long term insulin use (CMS/[facility])  E11.65 250.00   5. Impaired mobility and ADLs  Z74.09 V49.89    Z78.9      Patient Active Problem List   Diagnosis   • Type 2 diabetes mellitus with diabetic polyneuropathy, with long-term current use of insulin (CMS/[facility])   • Chronic obstructive pulmonary disease (CMS/[facility])   • Chronic midline low back pain without sciatica   • Vitamin D deficiency   • Type 2 diabetes mellitus (CMS/[facility])   • Tobacco dependence syndrome   • Surgical follow-up care   • Shoulder joint pain   • Peripheral vascular disease (CMS/[facility])   • Pain   • Neurologic disorder associated with diabetes mellitus (CMS/[facility])   • Morbid obesity with BMI of 50.0-59.9, adult (CMS/[facility])   • Mixed hyperlipidemia   • Kidney stone   • History of colon polyps   • GERD (gastroesophageal reflux disease)   • Excoriated eczema   • Essential hypertension   • Encounter for medication refill   • Dyslipidemia   • Diabetes mellitus (CMS/[facility])   • Coronary artery disease   • Chronic obstructive lung disease (CMS/[facility])   • Chronic folliculitis   • Chest pain   • Mild persistent asthma   • 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   • Uncontrolled type 2 diabetes mellitus with complication, with long-term current use of insulin (CMS/[facility])   • Anemia   • Hypothyroidism   • Carotid artery disease (CMS/[facility])   • Current smoker   • DM type 2 with diabetic peripheral neuropathy (CMS/[facility])   • [place] abuse   • PAD (peripheral artery disease) (CMS/[facility])   • S/P CABG x 3   • Intercostal pain   • Venous insufficiency   • Dyspnea on  exertion   • LAFB (left anterior fascicular block)   • Pulmonary hypertension (CMS/[facility])   • Left hip pain   • Neck pain   • Acute kidney injury superimposed on chronic kidney disease (CMS/[facility])   • MIKE and COPD overlap syndrome (CMS/[facility])   • Pneumonia due to COVID-19 virus   • CKD (chronic kidney disease) stage 4, GFR 15-29 ml/min (CMS/[facility])   • Morbid obesity (CMS/[facility])   • Type 2 diabetes mellitus with hyperglycemia, with long-term current use of insulin (CMS/[facility])   • Hyponatremia   • Hyperkalemia   • Anemia   • Cutaneous candidiasis   • Community acquired pneumonia of right middle lobe of lung   • MRSA infection   • Alkaline phosphatase elevation   • COVID-19 ruled out by laboratory testing   • Esophageal dysphagia   • Monilial esophagitis (CMS/[facility])   • NSTEMI, initial episode of care (CMS/[facility])   • CAD (coronary artery disease)   • Gastrointestinal hemorrhage   • Chronic respiratory failure with hypoxia (CMS/[facility])   • Pneumonia due to infectious organism   • Chronic hypercapnic respiratory failure (CMS/[facility])   • Cellulitis of left leg     Past Medical History:   Diagnosis Date   • Anxiety    • Carotid artery stenosis    • Chronic obstructive lung disease (CMS/[facility])    • CKD (chronic kidney disease) stage 4, GFR 15-29 ml/min (CMS/[facility])    • Colonic polyp    • Coronary arteriosclerosis    • Diabetes mellitus (CMS/[facility])    • Diabetic neuropathy (CMS/[facility])    • GERD (gastroesophageal reflux disease)    • History of transfusion    • Hypercholesterolemia    • Hypertension    • Hypomagnesemia 6/27/2021    Monitor and replace   • Morbid obesity (CMS/[facility])    • Nephrolithiasis    • Peripheral vascular disease (CMS/[facility])    • Sleep apnea    • Substance abuse (CMS/[facility])    • Vitamin D deficiency      Past Surgical History:   Procedure Laterality Date   • CARDIAC CATHETERIZATION N/A 7/14/2020   • CARDIAC CATHETERIZATION N/A 4/23/2021    Procedure: Left Heart Cath;  Surgeon: Melba Romo MD;  Location: [facility] CATH INVASIVE  LOCATION;  Service: Cardiology;  Laterality: N/A;   • CARDIAC CATHETERIZATION N/A 4/30/2021    Procedure: Percutaneous Coronary Intervention;  Surgeon: Russell Voss MD;  Location: [facility] CATH INVASIVE LOCATION;  Service: Cardiovascular;  Laterality: N/A;   • CARDIAC CATHETERIZATION N/A 4/30/2021    Procedure: Stent NIKKI coronary;  Surgeon: Russell Voss MD;  [facility] C[facility] INVASIVE LOC[facility];  Service: Cardiovascular;  Laterality: N/A;   • CAROTID STENT Left    • COLONOSCOPY     • COLONOSCOPY N/A 5/14/2021    Procedure: COLONOSCOPY;  Surgeon: Mingo Duarte MD;  Location: [facility] ENDOSCOPY;  Service: Gastroenterology;  Laterality: N/A;   • CORONARY ARTERY BYPASS GRAFT N/A 2013    CABG X 3   • CYSTOSCOPY BLADDER STONE LITHOTRIPSY Bilateral    • ENDOSCOPY N/A 4/12/2021    Procedure: ESOPHAGOGASTRODUODENOSCOPY;  Surgeon: Mingo Duarte MD;  Location: [facility] ENDOSCOPY;  Service: Gastroenterology;  Laterality: N/A;   • ENDOSCOPY N/A 5/14/2021    Procedure: ESOPHAGOGASTRODUODENOSCOPY;  Surgeon: Mingo Duarte MD;  Location: [facility] ENDOSCOPY;  Service: Gastroenterology;  Laterality: N/A;        PT Assessment (last 12 hours)      PT Evaluation and Treatment     Row Name 08/09/21 1115          Physical Therapy Time and Intention    Subjective Information  complains of;pain  -TA     Document Type  therapy note (daily note)  -TA     Mode of Treatment  individual therapy;physical therapy  -TA     Patient Effort  good  -TA     Comment  pt defered standing/gait  -TA     Row Name 08/09/21 1115          General Information    Patient Profile Reviewed  yes  -TA     Existing Precautions/Restrictions  fall;oxygen therapy device and L/min  -TA     Row Name 08/09/21 1115          Cognition    Affect/Mental Status (Cognitive)  WFL  -TA     Orientation Status (Cognition)  oriented x 4  -TA     Follows Commands (Cognition)  WFL  -TA     Personal Safety Interventions  fall prevention program  maintained;muscle strengthening facilitated;nonskid shoes/slippers when out of bed;supervised activity  -TA     Row Name 08/09/21 1115          Pain Scale: Numbers Pre/Post-Treatment    Pretreatment Pain Rating  9/10  -TA     Posttreatment Pain Rating  10/10  -TA     Pain Location - Side  Left  -TA     Pain Location - Orientation  lower  -TA     Pain Location  extremity  -TA     Row Name 08/09/21 1115          Bed Mobility    Scooting/Bridging [place] (Bed Mobility)  modified independence  -TA     Supine-Sit [place] (Bed Mobility)  modified independence  -TA     Sit-Supine [place] (Bed Mobility)  modified independence  -TA     Row Name 08/09/21 1115          Transfers    Sit-Stand [place] (Transfers)  not tested  -TA     Stand-Sit [place] (Transfers)  not tested  -TA     Row Name 08/09/21 1115          Stand-Sit Transfer    Assistive Device (Stand-Sit Transfers)  walker, front-wheeled  -TA     Row Name 08/09/21 1115          Gait/Stairs (Locomotion)    [place] Level (Gait)  not tested  -TA     Row Name 08/09/21 1115          Hip (Therapeutic Exercise)    Hip (Therapeutic Exercise)  AROM (active range of motion)  -TA     Hip AROM (Therapeutic Exercise)  bilateral;flexion;aBduction;aDduction;sitting 2/20 reps  -TA     Row Name 08/09/21 1115          Knee (Therapeutic Exercise)    Knee (Therapeutic Exercise)  AROM (active range of motion)  -TA     Knee AROM (Therapeutic Exercise)  bilateral;LAQ (long arc quad);sitting;2 sets 20 reps  -TA     Row Name 08/09/21 1115          Ankle (Therapeutic Exercise)    Ankle (Therapeutic Exercise)  AROM (active range of motion)  -TA     Ankle AROM (Therapeutic Exercise)  bilateral;dorsiflexion;plantarflexion;sitting;2 sets 20 reps  -TA     Row Name 08/09/21 1115          Plan of Care Review    Plan of Care Reviewed With  patient  -TA     Outcome Summary  pt defered standing/gait. pt performed AROM B LE exercises sitting @ EOB. pt will require 24/7 care &  HHPT @ D/C  -TA     Row Name 08/09/21 1115          Vital Signs    Pre Systolic BP Rehab  180  -TA     Pre Treatment Diastolic BP  77  -TA     Post Systolic BP Rehab  179  -TA     Post Treatment Diastolic BP  77  -TA     Pretreatment Heart Rate (beats/min)  60  -TA     Posttreatment Heart Rate (beats/min)  66  -TA     Pre SpO2 (%)  97  -TA     O2 Delivery Pre Treatment  supplemental O2  -TA     Post SpO2 (%)  96  -TA     O2 Delivery Post Treatment  supplemental O2  -TA     Pre Patient Position  Supine  -TA     Intra Patient Position  Sitting  -TA     Post Patient Position  Supine  -TA     Row Name 08/09/21 1115          Bed Mobility Goal 1 (PT)    Activity/Assistive Device (Bed Mobility Goal 1, PT)  sit to supine;supine to sit  -TA     [place] Level/Cues Needed (Bed Mobility Goal 1, PT)  independent  -TA     Time Frame (Bed Mobility Goal 1, PT)  by discharge  -TA     Progress/Outcomes (Bed Mobility Goal 1, PT)  (S) goal met  -TA     Row Name 08/09/21 1115          Transfer Goal 1 (PT)    Activity/Assistive Device (Transfer Goal 1, PT)  sit-to-stand/stand-to-sit;bed-to-chair/chair-to-bed  -TA     [place] Level/Cues Needed (Transfer Goal 1, PT)  modified independence  -TA     Time Frame (Transfer Goal 1, PT)  by discharge  -TA     Progress/Outcome (Transfer Goal 1, PT)  goal not met  -TA     Row Name 08/09/21 1115          Gait Training Goal 1 (PT)    Activity/Assistive Device (Gait Training Goal 1, PT)  gait (walking locomotion);assistive device use;backward stepping  -TA     [place] Level (Gait Training Goal 1, PT)  modified independence  -TA     Distance (Gait Training Goal 1, PT)  25'x2  -TA     Time Frame (Gait Training Goal 1, PT)  by discharge  -TA     Progress/Outcome (Gait Training Goal 1, PT)  goal not met  -TA     Row Name 08/09/21 1115          Positioning and Restraints    Pre-Treatment Position  in bed  -TA     Post Treatment Position  bed  -TA     In Bed  supine;call light within  reach;exit alarm on  -TA     Row Name 08/09/21 1115          Therapy Assessment/Plan (PT)    Comment, Therapy Assessment/Plan (PT)  continue  -TA       User Key  (r) = Recorded By, (t) = Taken By, (c) = Cosigned By    Initials Name Provider Type    Sue Matute PTA Physical Therapy Assistant        Physical Therapy Education                 Title: PT OT SLP Therapies (Done)     Topic: Physical Therapy (Done)     Point: Mobility training (Done)     Learning Progress Summary           Patient Acceptance, E,TB, VU by LW at 8/6/2021 1259    Acceptance, E,TB, VU by LR at 8/4/2021 0821    Comment: Educated on PT POC and goals.                   Point: Home exercise program (Done)     Learning Progress Summary           Patient Acceptance, E,TB, VU by LW at 8/6/2021 1259    Acceptance, E,TB, VU by LR at 8/4/2021 0821    Comment: Educated on PT POC and goals.                   Point: Body mechanics (Done)     Learning Progress Summary           Patient Acceptance, E,TB, VU by LW at 8/6/2021 1259    Acceptance, E,TB, VU by LR at 8/4/2021 0821    Comment: Educated on PT POC and goals.                   Point: Precautions (Done)     Learning Progress Summary           Patient Acceptance, E,TB, VU by LW at 8/6/2021 1259    Acceptance, E,TB, VU by LR at 8/4/2021 0821    Comment: Educated on PT POC and goals.    Acceptance, E,TB, VU by SC at 7/31/2021 1007                               User Key     Initials Effective Dates Name Provider Type Discipline    LW 06/16/21 -  Rekha Perez COTA/L Occupational Therapy Assistant OT    LR 06/16/21 -  Lucien Gilman Physical Therapist PT    SC 07/21/21 -  Angela West RN Registered Nurse Nurse              PT Recommendation and Plan  Anticipated Discharge Disposition (PT): home with 24/7 care, home with home health, skilled nursing facility  Therapy Frequency (PT): other (see comments) (5-7d/week)  Plan of Care Reviewed With: patient  Outcome Summary: pt defered  standing/gait. pt performed AROM B LE exercises sitting @ EOB. pt will require 24/7 care & HHPT @ D/C  Outcome Measures     Row Name 08/09/21 1300 08/08/21 1514 08/07/21 1300       How much help from another person do you currently need...    Turning from your back to your side while in flat bed without using bedrails?  4  -TA  3  -EM  3  -EM    Moving from lying on back to sitting on the side of a flat bed without bedrails?  4  -TA  3  -EM  3  -EM    Moving to and from a bed to a chair (including a wheelchair)?  3  -TA  3  -EM  3  -EM    Standing up from a chair using your arms (e.g., wheelchair, bedside chair)?  3  -TA  3  -EM  3  -EM    Climbing 3-5 steps with a railing?  3  -TA  3  -EM  3  -EM    To walk in hospital room?  3  -TA  3  -EM  3  -EM    AM-PAC 6 Clicks Score (PT)  20  -TA  18  -EM  18  -EM       Functional Assessment    Outcome Measure Options  AM-PAC 6 Clicks Basic Mobility (PT)  -TA  AM-PAC 6 Clicks Basic Mobility (PT)  -EM  AM-PAC 6 Clicks Basic Mobility (PT)  -EM      User Key  (r) = Recorded By, (t) = Taken By, (c) = Cosigned By    Initials Name Provider Type    Nadeem Horton, RENETTA Physical Therapy Assistant    Sue Matute PTA Physical Therapy Assistant           Time Calculation:   PT Charges     Row Name 08/09/21 1337             Time Calculation    Start Time  1115  -TA      Stop Time  1155  -TA      Time Calculation (min)  40 min  -TA         Time Calculation- PT    Total Timed Code Minutes- PT  40 minute(s)  -TA         Timed Charges    75379 - PT Therapeutic Exercise Minutes  30  -TA      57465 - PT Therapeutic Activity Minutes  10  -TA         Total Minutes    Timed Charges Total Minutes  40  -TA       Total Minutes  40  -TA        User Key  (r) = Recorded By, (t) = Taken By, (c) = Cosigned By    Initials Name Provider Type    Sue Matute PTA Physical Therapy Assistant        Therapy Charges for Today     Code Description Service Date Service Provider Modifiers Qty     23417600976 HC PT THER PROC EA 15 MIN 8/9/2021 Sue Bird, RENETTA GP 2    46161532608 HC PT THERAPEUTIC ACT EA 15 MIN 8/9/2021 Sue Bird PTA GP 1          PT G-Codes  Outcome Measure Options: AM-PAC 6 Clicks Basic Mobility (PT)  AM-PAC 6 Clicks Score (PT): 20  AM-PAC 6 Clicks Score (OT): 18    Sue Bird PTA  8/9/2021

## 2021-08-09 NOTE — PLAN OF CARE
Goal Outcome Evaluation:  Plan of Care Reviewed With: patient        Progress: improving  Outcome Summary: nursing ok'd tx pt agreeable to bath and toilet transfer w. [facility] for transfers, min assist for bath, mod assist to Critical access hospital gown.  cont poc

## 2021-08-09 NOTE — PLAN OF CARE
Problem: Adult Inpatient Plan of Care  Goal: Plan of Care Review  Outcome: Ongoing, Progressing  Goal: Patient-Specific Goal (Individualized)  Outcome: Ongoing, Progressing  Goal: Absence of Hospital-Acquired Illness or Injury  Outcome: Ongoing, Progressing  Intervention: Identify and Manage Fall Risk  Recent Flowsheet Documentation  Taken 8/9/2021 1649 by Angela West RN  Safety Promotion/Fall Prevention: nonskid shoes/slippers when out of bed  Taken 8/9/2021 1500 by Angela West RN  Safety Promotion/Fall Prevention:   toileting scheduled   safety round/check completed   room organization consistent   patient off unit   nonskid shoes/slippers when out of bed  Intervention: Prevent Skin Injury  Recent Flowsheet Documentation  Taken 8/9/2021 1649 by Angela West RN  Body Position: sitting up in bed  Skin Protection: incontinence pads utilized  Intervention: Prevent and Manage VTE (venous thromboembolism) Risk  Recent Flowsheet Documentation  Taken 8/9/2021 1649 by Angela West RN  VTE Prevention/Management: other (see comments)  Goal: Optimal Comfort and Wellbeing  Outcome: Ongoing, Progressing  Intervention: Provide Person-Centered Care  Recent Flowsheet Documentation  Taken 8/9/2021 1649 by Angela West RN  Trust Relationship/Rapport: care explained  Goal: Readiness for Transition of Care  Outcome: Ongoing, Progressing     Problem: Skin or Soft Tissue Infection  Goal: Infection Symptom Resolution  Outcome: Ongoing, Progressing  Intervention: Provide Meticulous Infection Site Care  Recent Flowsheet Documentation  Taken 8/9/2021 1649 by Angela West RN  Topical Inflammation Care: border marked  Intervention: Minimize and Manage Infection Progression  Recent Flowsheet Documentation  Taken 8/9/2021 1500 by Angela West RN  Isolation Precautions: droplet precautions maintained     Problem: COPD Comorbidity  Goal: Maintenance of COPD Symptom Control  Outcome: Ongoing,  Progressing  Intervention: Maintain COPD-Symptom Control  Recent Flowsheet Documentation  Taken 8/9/2021 1649 by Angela West RN  Medication Review/Management: medications reviewed     Problem: Hypertension Comorbidity  Goal: Blood Pressure in Desired Range  Outcome: Ongoing, Progressing  Intervention: Maintain Hypertension-Management Strategies  Recent Flowsheet Documentation  Taken 8/9/2021 1649 by Angela West RN  Medication Review/Management: medications reviewed     Problem: Fall Injury Risk  Goal: Absence of Fall and Fall-Related Injury  Outcome: Ongoing, Progressing  Intervention: Identify and Manage Contributors to Fall Injury Risk  Recent Flowsheet Documentation  Taken 8/9/2021 1649 by Angela West RN  Medication Review/Management: medications reviewed  Intervention: Promote Injury-Free Environment  Recent Flowsheet Documentation  Taken 8/9/2021 1649 by Angela West RN  Safety Promotion/Fall Prevention: nonskid shoes/slippers when out of bed  Taken 8/9/2021 1500 by Angela West RN  Safety Promotion/Fall Prevention:   toileting scheduled   safety round/check completed   room organization consistent   patient off unit   nonskid shoes/slippers when out of bed     Problem: Diabetes Comorbidity  Goal: Blood Glucose Level Within Desired Range  Outcome: Ongoing, Progressing  Intervention: Maintain Glycemic Control  Recent Flowsheet Documentation  Taken 8/9/2021 1649 by Angela West RN  Glycemic Management: blood glucose monitoring     Problem: Obstructive Sleep Apnea Risk or Actual (Comorbidity Management)  Goal: Unobstructed Breathing During Sleep  Outcome: Ongoing, Progressing   Goal Outcome Evaluation:

## 2021-08-09 NOTE — PROGRESS NOTES
[facility] Medicine Services  INPATIENT PROGRESS NOTE    Length of Stay: 6  Date of Admission: 7/30/2021  Primary Care Physician: Rianna Macias MD    Subjective   Chief Complaint: Lower extremity cellulitis  HPI: Leg still hurts but improving.  No other new concerns right now.       Review of Systems     All pertinent negatives and positives are as above. All other systems have been reviewed and are negative unless otherwise stated.     Objective    Temp:  [96.8 °F (36 °C)-97.4 °F (36.3 °C)] 97.4 °F (36.3 °C)  Heart Rate:  [56-69] 56  Resp:  [18-22] 22  BP: (122-194)/(68-90) 156/84    Physical Exam  Constitutional:       General: She is not in acute distress.     Appearance: She is not toxic-appearing.   HENT:      Head: Normocephalic and atraumatic.      Right Ear: External ear normal.      Left Ear: External ear normal.      Nose: Nose normal.      Mouth/Throat:      Mouth: Mucous membranes are moist.      Pharynx: Oropharynx is clear.   Eyes:      Conjunctiva/sclera: Conjunctivae normal.   Cardiovascular:      Rate and Rhythm: Normal rate and regular rhythm.      Pulses: Normal pulses.      Heart sounds: Normal heart sounds.   Pulmonary:      Effort: Pulmonary effort is normal. No respiratory distress.      Breath sounds: Normal breath sounds.   Abdominal:      General: Bowel sounds are normal.      Palpations: Abdomen is soft.      Tenderness: There is no abdominal tenderness.   Musculoskeletal:         General: Swelling present.      Cervical back: Neck supple.   Skin:     General: Skin is warm.      Capillary Refill: Capillary refill takes less than 2 seconds.      Findings: Erythema present.      Comments: LLE induration, noted with erythema consistent with cellulitis. Slightly improved today   Neurological:      Mental Status: She is alert.   Psychiatric:         Mood and Affect: Mood normal.         Behavior: Behavior normal.       Results Review:  I have  reviewed the labs, radiology results, and diagnostic studies.    Laboratory Data:   Results from last 7 days   Lab Units 08/09/21 0533 08/08/21 0453 08/07/21 2010 08/07/21  0649 08/07/21  0649   SODIUM mmol/L 132* 131*  --   --  136   POTASSIUM mmol/L 4.1 4.5  --   --  4.5   CHLORIDE mmol/L 98 97*  --   --  101   CO2 mmol/L 27.0 21.0*  --   --  26.0   BUN mg/dL 42* 43*  --   --  40*   CREATININE mg/dL 2.02* 2.10*  --   --  1.96*   GLUCOSE mg/dL 260* 286* 373*   < > 186*   CALCIUM mg/dL 8.6 8.8  --   --  8.9   ANION GAP mmol/L 7.0 13.0  --   --  9.0    < > = values in this interval not displayed.     Estimated Creatinine Clearance: 40.8 mL/min (A) (by C-G formula based on SCr of 2.02 mg/dL (H)).          Results from last 7 days   Lab Units 08/09/21 0533 08/08/21 0453 08/07/21 0649 08/06/21 0613 08/05/21  0523   WBC 10*3/mm3 8.40 8.64 8.51 8.46 7.89   HEMOGLOBIN g/dL 7.7* 7.7* 7.3* 7.3* 7.2*   HEMATOCRIT % 25.0* 25.0* 23.4* 23.5* 22.9*   PLATELETS 10*3/mm3 295 300 291 311 288           Culture Data:   No results found for: BLOODCX  No results found for: URINECX  No results found for: RESPCX  No results found for: WOUNDCX  No results found for: STOOLCX  No components found for: BODYFLD    Radiology Data:   Imaging Results (Last 24 Hours)     ** No results found for the last 24 hours. **          I have reviewed the patient's current medications.     Assessment/Plan     Active Hospital Problems    Diagnosis    • Cellulitis of left leg        Plan:      -Continue vancomycin and cefepime for now.  She previously was on Zosyn but this was stopped due to ERIKA.   -Continue metronidazole for anaerobic coverage  -She has had very slow improvement but it does appear to be improving at this time.  For now we will continue with IV antibiotic therapy.  -Ultrasound was negative for abscess.  -Appreciate general surgery's help.  -Nephrology's been consulted given her renal disease and acute on chronic kidney disease.   Appreciate their help.  -COPD appears stable this time, continue current meds and interventions  -Anemia slightly worse but likely related to renal disease, continue to monitor for any active signs of bleeding.  IV iron ordered per Nephro.  -Continue current insulin dosing and glucose checks  -will taper down pain medications    -DVT prophylaxis with heparin  -CODE STATUS: Full

## 2021-08-09 NOTE — PLAN OF CARE
Goal Outcome Evaluation:           Progress: improving  Outcome Summary: pt has rested well through night; she is refusing to let us take her pure wick out and try to go to the restroom at this time. at this time, there are no new purewicks available; educated pt that if this one gets dirty or is not suctioning properly, it will have to come out; will continue to monitor.

## 2021-08-09 NOTE — PROGRESS NOTES
"[facility]  [address] [place] [address]. [address]  T - [phone]  F - [phone]     Progress Note          PATIENT  DEMOGRAPHICS   PATIENT NAME: Yamileth Slater                      PHYSICIAN: Ace Lauren MD  : 1960  MRN: [id number]   LOS: 6 days    Patient Care Team:  Rianna Macias MD as PCP - General (Family Medicine)  Subjective   SUBJECTIVE   No acute events overnight.still has leg edema but better         Objective   OBJECTIVE   Vital Signs  Temp:  [96.8 °F (36 °C)-97.5 °F (36.4 °C)] 97.3 °F (36.3 °C)  Heart Rate:  [62-69] 62  Resp:  [18-22] 22  BP: (122-194)/(68-90) 142/72    Flowsheet Rows      First Filed Value   Admission Height  167.6 cm (66\") Documented at 2021   Admission Weight  127 kg (280 lb) Documented at 2021           I/O last 3 completed shifts:  In: 600 [P.O.:600]  Out: 2700 [Urine:2700]    PHYSICAL EXAM    Physical Exam  Constitutional:       General: She is not in acute distress.     Appearance: She is obese. She is ill-appearing.   HENT:      Head: Normocephalic and atraumatic.   Cardiovascular:      Rate and Rhythm: Normal rate and regular rhythm.   Pulmonary:      Effort: Pulmonary effort is normal.      Breath sounds: Normal breath sounds.   Abdominal:      General: Bowel sounds are normal. There is distension.   Musculoskeletal:      Right lower leg: Edema present.      Left lower leg: Edema present.   Skin:     General: Skin is warm and dry.      Coloration: Skin is pale.   Neurological:      General: No focal deficit present.      Mental Status: She is alert and oriented to person, place, and time.         RESULTS   Results Review:    Results from last 7 days   Lab Units 21  0533 21  0453 21  0649 21  0649   SODIUM mmol/L 132* 131*  --   --  136   POTASSIUM mmol/L 4.1 4.5  --   --  4.5   CHLORIDE mmol/L 98 97*  --   --  101   CO2 mmol/L 27.0 21.0*  --   --  26.0   BUN mg/dL " 42* 43*  --   --  40*   CREATININE mg/dL 2.02* 2.10*  --   --  1.96*   CALCIUM mg/dL 8.6 8.8  --   --  8.9   GLUCOSE mg/dL 260* 286* 373*   < > 186*    < > = values in this interval not displayed.       Estimated Creatinine Clearance: 40.8 mL/min (A) (by C-G formula based on SCr of 2.02 mg/dL (H)).                Results from last 7 days   Lab Units 08/09/21  0533 08/08/21  0453 08/07/21  0649 08/06/21  0613 08/05/21  0523   WBC 10*3/mm3 8.40 8.64 8.51 8.46 7.89   HEMOGLOBIN g/dL 7.7* 7.7* 7.3* 7.3* 7.2*   PLATELETS 10*3/mm3 295 300 291 311 288               Imaging Results (Last 24 Hours)     ** No results found for the last 24 hours. **           MEDICATIONS    acidophilus, 1 tablet, Oral, Daily  aspirin, 81 mg, Oral, Daily  atorvastatin, 40 mg, Oral, Daily  bumetanide, 2 mg, Intravenous, Daily  cefepime, 2 g, Intravenous, Q12H  cholecalciferol, 50,000 Units, Oral, Weekly  clopidogrel, 75 mg, Oral, Daily  docosanol, , Topical, 5x Daily  DULoxetine, 20 mg, Oral, Daily  gabapentin, 800 mg, Oral, TID  heparin (porcine), 5,000 Units, Subcutaneous, Q12H  insulin aspart, 0-24 Units, Subcutaneous, TID AC  insulin detemir, 55 Units, Subcutaneous, Q12H  isosorbide mononitrate, 30 mg, Oral, Daily  metoprolol tartrate, 100 mg, Oral, Q12H  metroNIDAZOLE, 500 mg, Intravenous, Q8H  oxybutynin XL, 5 mg, Oral, Daily  sodium bicarbonate, 1,300 mg, Oral, TID  sodium chloride, 10 mL, Intravenous, Q12H      Pharmacy to Dose Cefepime,         Assessment/Plan   ASSESSMENT / PLAN      Cellulitis of left leg    1.  ERIKA on CKD 4- Baseline creatinine around 2.0, up to 2.7 peak. Now 2.1-2.0mg/dL with    tolerating vancomycin for now.  Zosyn has been discontinued and changed to cefepime. tolerating bumex well. Keep iv bumex 2mg IV and increase it to bid     2.  Hyponatremia- sodium stable- fluid restriction 1000cc/day. Vomiting earlier today     3.  Left lower extremity cellulitis- on vancomycin, flagyl and cefepime- per primary team, failed  previous antibiotic course. Has u/s of leg didn't show abscess. Blood culture is negative at 5 days. CRP coming down 6.47. also on flagyl     4.  History of CAD     5.  History of COPD with recent copd exacerbation / pna at outside hospital      6.  Anemia- hemoglobin low - Hgb 7.7-anemia is consistent with fe def / anemia of chronic disease and on iv fe. Also has low b12 in OSH and received b12 IM     7. Vitamin D deficiency on vitamin d 50K weekly     8. Metabolic Acidosis- on sodium bicarb 1300mg po tid- bicarb better lower the dose         This document has been electronically signed by Ace Lauren MD on August 9, 2021 11:48 CDT

## 2021-08-09 NOTE — PLAN OF CARE
Goal Outcome Evaluation:  Plan of Care Reviewed With: patient           Outcome Summary: pt defered standing/gait. pt performed AROM B LE exercises sitting @ EOB. pt will require 24/7 care & HHPT @ D/C

## 2021-08-09 NOTE — THERAPY TREATMENT NOTE
Patient Name: Yamileth Slater  : 1960    MRN: [id number]                              Today's Date: 2021       Admit Date: 2021    Visit Dx:     ICD-10-CM ICD-9-CM   1. Cellulitis of left leg  L03.116 682.6   2. Anemia, unspecified type  D64.9 285.9   3. Chronic kidney disease, unspecified CKD stage  N18.9 585.9   4. Type 2 diabetes mellitus with hyperglycemia, unspecified whether long term insulin use (CMS/[facility])  E11.65 250.00   5. Impaired mobility and ADLs  Z74.09 V49.89    Z78.9      Patient Active Problem List   Diagnosis   • Type 2 diabetes mellitus with diabetic polyneuropathy, with long-term current use of insulin (CMS/[facility])   • Chronic obstructive pulmonary disease (CMS/[facility])   • Chronic midline low back pain without sciatica   • Vitamin D deficiency   • Type 2 diabetes mellitus (CMS/[facility])   • Tobacco dependence syndrome   • Surgical follow-up care   • Shoulder joint pain   • Peripheral vascular disease (CMS/[facility])   • Pain   • Neurologic disorder associated with diabetes mellitus (CMS/[facility])   • Morbid obesity with BMI of 50.0-59.9, adult (CMS/[facility])   • Mixed hyperlipidemia   • Kidney stone   • History of colon polyps   • GERD (gastroesophageal reflux disease)   • Excoriated eczema   • Essential hypertension   • Encounter for medication refill   • Dyslipidemia   • Diabetes mellitus (CMS/[facility])   • Coronary artery disease   • Chronic obstructive lung disease (CMS/[facility])   • Chronic folliculitis   • Chest pain   • Mild persistent asthma   • 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   • Uncontrolled type 2 diabetes mellitus with complication, with long-term current use of insulin (CMS/[facility])   • Anemia   • Hypothyroidism   • Carotid artery disease (CMS/[facility])   • Current smoker   • DM type 2 with diabetic peripheral neuropathy (CMS/[facility])   • [place] abuse   • PAD (peripheral artery disease) (CMS/[facility])   • S/P CABG x 3   • Intercostal pain   • Venous insufficiency   • Dyspnea on exertion   •  LAFB (left anterior fascicular block)   • Pulmonary hypertension (CMS/[facility])   • Left hip pain   • Neck pain   • Acute kidney injury superimposed on chronic kidney disease (CMS/[facility])   • MIKE and COPD overlap syndrome (CMS/[facility])   • Pneumonia due to COVID-19 virus   • CKD (chronic kidney disease) stage 4, GFR 15-29 ml/min (CMS/[facility])   • Morbid obesity (CMS/[facility])   • Type 2 diabetes mellitus with hyperglycemia, with long-term current use of insulin (CMS/[facility])   • Hyponatremia   • Hyperkalemia   • Anemia   • Cutaneous candidiasis   • Community acquired pneumonia of right middle lobe of lung   • MRSA infection   • Alkaline phosphatase elevation   • COVID-19 ruled out by laboratory testing   • Esophageal dysphagia   • Monilial esophagitis (CMS/[facility])   • NSTEMI, initial episode of care (CMS/[facility])   • CAD (coronary artery disease)   • Gastrointestinal hemorrhage   • Chronic respiratory failure with hypoxia (CMS/[facility])   • Pneumonia due to infectious organism   • Chronic hypercapnic respiratory failure (CMS/[facility])   • Cellulitis of left leg     Past Medical History:   Diagnosis Date   • Anxiety    • Carotid artery stenosis    • Chronic obstructive lung disease (CMS/[facility])    • CKD (chronic kidney disease) stage 4, GFR 15-29 ml/min (CMS/[facility])    • Colonic polyp    • Coronary arteriosclerosis    • Diabetes mellitus (CMS/[facility])    • Diabetic neuropathy (CMS/[facility])    • GERD (gastroesophageal reflux disease)    • History of transfusion    • Hypercholesterolemia    • Hypertension    • Hypomagnesemia 6/27/2021    Monitor and replace   • Morbid obesity (CMS/[facility])    • Nephrolithiasis    • Peripheral vascular disease (CMS/[facility])    • Sleep apnea    • Substance abuse (CMS/[facility])    • Vitamin D deficiency      Past Surgical History:   Procedure Laterality Date   • CARDIAC CATHETERIZATION N/A 7/14/2020   • CARDIAC CATHETERIZATION N/A 4/23/2021    Procedure: Left Heart Cath;  Surgeon: Melba Romo MD;  Location: [facility] CATH INVASIVE LOCATION;  Service:  Cardiology;  Laterality: N/A;   • CARDIAC CATHETERIZATION N/A 4/30/2021    Procedure: Percutaneous Coronary Intervention;  Surgeon: Russell Voss MD;  Location: [facility] CATH INVASIVE LOCATION;  Service: Cardiovascular;  Laterality: N/A;   • CARDIAC CATHETERIZATION N/A 4/30/2021    Procedure: Stent NIKKI coronary;  Surgeon: Russell Voss MD;  Location: [facility] CATH INVASIVE LOCATION;  Service: Cardiovascular;  Laterality: N/A;   • CAROTID STENT Left    • COLONOSCOPY     • COLONOSCOPY N/A 5/14/2021    Procedure: COLONOSCOPY;  Surgeon: Mingo Duarte MD;  Location: [facility] ENDOSCOPY;  Service: Gastroenterology;  Laterality: N/A;   • CORONARY ARTERY BYPASS GRAFT N/A 2013    CABG X 3   • CYSTOSCOPY BLADDER STONE LITHOTRIPSY Bilateral    • ENDOSCOPY N/A 4/12/2021    Procedure: ESOPHAGOGASTRODUODENOSCOPY;  Surgeon: Mingo Duarte MD;  Location: [facility] ENDOSCOPY;  Service: Gastroenterology;  Laterality: N/A;   • ENDOSCOPY N/A 5/14/2021    Procedure: ESOPHAGOGASTRODUODENOSCOPY;  Surgeon: Mingo Duarte MD;  Location: [facility] ENDOSCOPY;  Service: Gastroenterology;  Laterality: N/A;     General Information     Row Name 08/09/21 0830          OT Time and Intention    Document Type  therapy note (daily note)  -     Mode of Treatment  individual therapy;occupational therapy  -     Row Name 08/09/21 0830          General Information    Patient Profile Reviewed  yes  -     Existing Precautions/Restrictions  fall;oxygen therapy device and L/min  -     Row Name 08/09/21 0830          Cognition    Orientation Status (Cognition)  oriented x 4  -     Row Name 08/09/21 0830          Safety Issues, Functional Mobility    Impairments Affecting Function (Mobility)  balance;endurance/activity tolerance;coordination;shortness of breath;strength  -       User Key  (r) = Recorded By, (t) = Taken By, (c) = Cosigned By    Initials Name Provider Type    RC Jyoti Jaramillo COTA/L Occupational Therapy Assistant           Mobility/ADL's    No documentation.       Obj/Interventions    No documentation.       Goals/Plan     Row Name 08/09/21 0830          Transfer Goal 1 (OT)    Activity/Assistive Device (Transfer Goal 1, OT)  toilet  -RC     [place] Level/Cues Needed (Transfer Goal 1, OT)  supervision required  -RC     Time Frame (Transfer Goal 1, OT)  long term goal (LTG);by discharge  -RC     Progress/Outcome (Transfer Goal 1, OT)  goal not met  -RC     Row Name 08/09/21 0830          Bathing Goal 1 (OT)    Activity/Device (Bathing Goal 1, OT)  upper body bathing AD as needed  -RC     [place] Level/Cues Needed (Bathing Goal 1, OT)  set-up required;supervision required  -RC     Time Frame (Bathing Goal 1, OT)  long term goal (LTG);by discharge  -RC     Progress/Outcomes (Bathing Goal 1, OT)  goal not met  -RC     Row Name 08/09/21 0830          Dressing Goal 1 (OT)    Activity/Device (Dressing Goal 1, OT)  upper body dressing AD as needed  -RC     [place]/Cues Needed (Dressing Goal 1, OT)  supervision required;set-up required  -RC     Time Frame (Dressing Goal 1, OT)  long term goal (LTG);by discharge  -RC     Progress/Outcome (Dressing Goal 1, OT)  goal not met  -RC     Row Name 08/09/21 0830          Toileting Goal 1 (OT)    Activity/Device (Toileting Goal 1, OT)  toileting skills, all  -RC     [place] Level/Cues Needed (Toileting Goal 1, OT)  set-up required;supervision required  -RC     Time Frame (Toileting Goal 1, OT)  long term goal (LTG);by discharge  -RC     Progress/Outcome (Toileting Goal 1, OT)  goal not met  -RC       User Key  (r) = Recorded By, (t) = Taken By, (c) = Cosigned By    Initials Name Provider Type    Jyoti Pappas COTA/L Occupational Therapy Assistant        Clinical Impression    No documentation.       Outcome Measures    No documentation.         Occupational Therapy Education                 Title: PT OT SLP Therapies (Done)     Topic: Occupational Therapy (Done)      Point: ADL training (Done)     Description:   Instruct learner(s) on proper safety adaptation and remediation techniques during self care or transfers.   Instruct in proper use of assistive devices.              Learning Progress Summary           Patient Acceptance, E,TB, VU by LW at 8/6/2021 1259    Acceptance, E,TB, VU,NR by  at 8/5/2021 0824    Comment: POC, role of OT, transfer training    Acceptance, E, VU by AS at 8/4/2021 1325    Comment: UE strengthening exercises    Acceptance, E,TB, VU by SC at 7/31/2021 1007                   Point: Home exercise program (Done)     Description:   Instruct learner(s) on appropriate technique for monitoring, assisting and/or progressing therapeutic exercises/activities.              Learning Progress Summary           Patient Acceptance, E,TB, VU by LW at 8/6/2021 1259    Acceptance, E, VU by AS at 8/4/2021 1325    Comment: UE strengthening exercises    Acceptance, E,TB, VU by SC at 7/31/2021 1007                   Point: Precautions (Done)     Description:   Instruct learner(s) on prescribed precautions during self-care and functional transfers.              Learning Progress Summary           Patient Acceptance, E,TB, VU by LW at 8/6/2021 1259    Acceptance, E,TB, VU,NR by  at 8/5/2021 0824    Comment: POC, role of OT, transfer training    Acceptance, E, VU by AS at 8/4/2021 1325    Comment: UE strengthening exercises    Acceptance, E, VU by ME at 8/3/2021 1508    Comment: Educated on OT and POC. Educated to call for assistance. Educated on safety precautions.    Acceptance, E,TB, VU by SC at 7/31/2021 1007                   Point: Body mechanics (Done)     Description:   Instruct learner(s) on proper positioning and spine alignment during self-care, functional mobility activities and/or exercises.              Learning Progress Summary           Patient Acceptance, E,TB, VU by LW at 8/6/2021 1259    Acceptance, E,TB, VU,NR by  at 8/5/2021 0824    Comment: POC,  role of OT, transfer training    Acceptance, E, VU by ME at 8/3/2021 1508    Comment: Educated on OT and POC. Educated to call for assistance. Educated on safety precautions.    Acceptance, E,TB, VU by SC at 7/31/2021 1007                               User Key     Initials Effective Dates Name Provider Type Discipline     06/16/21 -  Rekha Perez OLMSTEAD/L Occupational Therapy Assistant OT    AS 03/18/21 -  Arti Navarrete, OT Occupational Therapist OT    ME 06/16/21 -  Nikole Espino OTR/L Occupational Therapist OT     06/14/21 -  Ottoniel Robles, OT Occupational Therapist OT    SC 07/21/21 -  Angela West RN Registered Nurse Nurse              OT Recommendation and Plan     Plan of Care Review  Plan of Care Reviewed With: patient  Progress: improving  Outcome Summary: nursing ok'd tx pt agreeable to bath and toilet transfer w. cga-sba for transfers, min assist for bath, mod assist to [facility] gown.  cont poc     Time Calculation:   Time Calculation- OT     Row Name 08/09/21 1140             Time Calculation- OT    OT Start Time  0830  -RC      OT Stop Time  0923  -RC      OT Time Calculation (min)  53 min  -RC      Total Timed Code Minutes- OT  53 minute(s)  -RC      OT Received On  08/09/21  -RC         Timed Charges    92224 - OT Self Care/Mgmt Minutes  53  -RC         Total Minutes    Timed Charges Total Minutes  53  -RC       Total Minutes  53  -RC        User Key  (r) = Recorded By, (t) = Taken By, (c) = Cosigned By    Initials Name Provider Type    RC Jyoti Jaramillo OLMSTEAD/L Occupational Therapy Assistant        Therapy Charges for Today     Code Description Service Date Service Provider Modifiers Qty    [id number] HC OT SELF CARE/MGMT/TRAIN EA 15 MIN 8/9/2021 ClintJyoti magana G, OLMSTEAD/L GO 4               Jyoti Jaramillo OLMSTEAD/L  8/9/2021

## 2021-08-10 LAB
ANION GAP SERPL CALCULATED.3IONS-SCNC: 9 MMOL/L (ref 5–15)
BASOPHILS # BLD AUTO: 0.09 10*3/MM3 (ref 0–0.2)
BASOPHILS NFR BLD AUTO: 1 % (ref 0–1.5)
BUN SERPL-MCNC: 45 MG/DL (ref 8–23)
BUN/CREAT SERPL: 19 (ref 7–25)
CALCIUM SPEC-SCNC: 8.4 MG/DL (ref 8.6–10.5)
CHLORIDE SERPL-SCNC: 99 MMOL/L (ref 98–107)
CO2 SERPL-SCNC: 29 MMOL/L (ref 22–29)
CREAT SERPL-MCNC: 2.37 MG/DL (ref 0.57–1)
DEPRECATED RDW RBC AUTO: 48.7 FL (ref 37–54)
EOSINOPHIL # BLD AUTO: 0.44 10*3/MM3 (ref 0–0.4)
EOSINOPHIL NFR BLD AUTO: 4.8 % (ref 0.3–6.2)
ERYTHROCYTE [DISTWIDTH] IN BLOOD BY AUTOMATED COUNT: 15.5 % (ref 12.3–15.4)
GFR SERPL CREATININE-BSD FRML MDRD: 21 ML/MIN/1.73
GLUCOSE BLDC GLUCOMTR-MCNC: 173 MG/DL (ref 70–130)
GLUCOSE BLDC GLUCOMTR-MCNC: 272 MG/DL (ref 70–130)
GLUCOSE SERPL-MCNC: 171 MG/DL (ref 65–99)
HCT VFR BLD AUTO: 25 % (ref 34–46.6)
HGB BLD-MCNC: 7.8 G/DL (ref 12–15.9)
IMM GRANULOCYTES # BLD AUTO: 0.12 10*3/MM3 (ref 0–0.05)
IMM GRANULOCYTES NFR BLD AUTO: 1.3 % (ref 0–0.5)
LYMPHOCYTES # BLD AUTO: 2.74 10*3/MM3 (ref 0.7–3.1)
LYMPHOCYTES NFR BLD AUTO: 30.1 % (ref 19.6–45.3)
MCH RBC QN AUTO: 27.2 PG (ref 26.6–33)
MCHC RBC AUTO-ENTMCNC: 31.2 G/DL (ref 31.5–35.7)
MCV RBC AUTO: 87.1 FL (ref 79–97)
MONOCYTES # BLD AUTO: 0.64 10*3/MM3 (ref 0.1–0.9)
MONOCYTES NFR BLD AUTO: 7 % (ref 5–12)
NEUTROPHILS NFR BLD AUTO: 5.07 10*3/MM3 (ref 1.7–7)
NEUTROPHILS NFR BLD AUTO: 55.8 % (ref 42.7–76)
NRBC BLD AUTO-RTO: 0 /100 WBC (ref 0–0.2)
PLATELET # BLD AUTO: 295 10*3/MM3 (ref 140–450)
PMV BLD AUTO: 8.5 FL (ref 6–12)
POTASSIUM SERPL-SCNC: 4.6 MMOL/L (ref 3.5–5.2)
RBC # BLD AUTO: 2.87 10*6/MM3 (ref 3.77–5.28)
SODIUM SERPL-SCNC: 137 MMOL/L (ref 136–145)
WBC # BLD AUTO: 9.1 10*3/MM3 (ref 3.4–10.8)

## 2021-08-10 PROCEDURE — 25010000002 HEPARIN (PORCINE) PER 1000 UNITS: Performed by: INTERNAL MEDICINE

## 2021-08-10 PROCEDURE — 80048 BASIC METABOLIC PNL TOTAL CA: CPT | Performed by: FAMILY MEDICINE

## 2021-08-10 PROCEDURE — 97110 THERAPEUTIC EXERCISES: CPT

## 2021-08-10 PROCEDURE — 85025 COMPLETE CBC W/AUTO DIFF WBC: CPT | Performed by: FAMILY MEDICINE

## 2021-08-10 PROCEDURE — 63710000001 INSULIN DETEMIR PER 5 UNITS: Performed by: INTERNAL MEDICINE

## 2021-08-10 PROCEDURE — 25010000002 CEFEPIME PER 500 MG: Performed by: FAMILY MEDICINE

## 2021-08-10 PROCEDURE — 25010000002 HYDROMORPHONE 1 MG/ML SOLUTION: Performed by: INTERNAL MEDICINE

## 2021-08-10 PROCEDURE — 97535 SELF CARE MNGMENT TRAINING: CPT

## 2021-08-10 PROCEDURE — 25010000002 EPOETIN ALFA-EPBX 10000 UNIT/ML SOLUTION: Performed by: INTERNAL MEDICINE

## 2021-08-10 PROCEDURE — 97530 THERAPEUTIC ACTIVITIES: CPT

## 2021-08-10 PROCEDURE — 82962 GLUCOSE BLOOD TEST: CPT

## 2021-08-10 PROCEDURE — 63710000001 INSULIN ASPART PER 5 UNITS: Performed by: FAMILY MEDICINE

## 2021-08-10 RX ADMIN — INSULIN DETEMIR 55 UNITS: 100 INJECTION, SOLUTION SUBCUTANEOUS at 08:14

## 2021-08-10 RX ADMIN — HEPARIN SODIUM 5000 UNITS: 5000 INJECTION INTRAVENOUS; SUBCUTANEOUS at 08:12

## 2021-08-10 RX ADMIN — ASPIRIN 81 MG: 81 TABLET, FILM COATED ORAL at 08:11

## 2021-08-10 RX ADMIN — INSULIN ASPART 4 UNITS: 100 INJECTION, SOLUTION INTRAVENOUS; SUBCUTANEOUS at 07:28

## 2021-08-10 RX ADMIN — HEPARIN SODIUM 5000 UNITS: 5000 INJECTION INTRAVENOUS; SUBCUTANEOUS at 20:28

## 2021-08-10 RX ADMIN — METRONIDAZOLE 500 MG: 500 INJECTION, SOLUTION INTRAVENOUS at 03:44

## 2021-08-10 RX ADMIN — DULOXETINE HYDROCHLORIDE 20 MG: 20 CAPSULE, DELAYED RELEASE ORAL at 08:11

## 2021-08-10 RX ADMIN — HYDROMORPHONE HYDROCHLORIDE 0.25 MG: 1 INJECTION, SOLUTION INTRAMUSCULAR; INTRAVENOUS; SUBCUTANEOUS at 05:01

## 2021-08-10 RX ADMIN — DOCOSANOL: 100 CREAM TOPICAL at 17:23

## 2021-08-10 RX ADMIN — ISOSORBIDE MONONITRATE 30 MG: 30 TABLET, EXTENDED RELEASE ORAL at 08:11

## 2021-08-10 RX ADMIN — INSULIN ASPART 24 UNITS: 100 INJECTION, SOLUTION INTRAVENOUS; SUBCUTANEOUS at 17:22

## 2021-08-10 RX ADMIN — Medication 1 TABLET: at 08:11

## 2021-08-10 RX ADMIN — SODIUM BICARBONATE 650 MG: 650 TABLET ORAL at 08:11

## 2021-08-10 RX ADMIN — INSULIN DETEMIR 55 UNITS: 100 INJECTION, SOLUTION SUBCUTANEOUS at 20:28

## 2021-08-10 RX ADMIN — GABAPENTIN 800 MG: 400 CAPSULE ORAL at 17:23

## 2021-08-10 RX ADMIN — DOCOSANOL: 100 CREAM TOPICAL at 05:44

## 2021-08-10 RX ADMIN — HYDROMORPHONE HYDROCHLORIDE 0.25 MG: 1 INJECTION, SOLUTION INTRAMUSCULAR; INTRAVENOUS; SUBCUTANEOUS at 10:46

## 2021-08-10 RX ADMIN — CLOPIDOGREL BISULFATE 75 MG: 75 TABLET ORAL at 08:12

## 2021-08-10 RX ADMIN — BUMETANIDE 2 MG: 0.25 INJECTION INTRAMUSCULAR; INTRAVENOUS at 05:43

## 2021-08-10 RX ADMIN — HYDROCODONE BITARTRATE AND ACETAMINOPHEN 1 TABLET: 5; 325 TABLET ORAL at 01:24

## 2021-08-10 RX ADMIN — SODIUM CHLORIDE, PRESERVATIVE FREE 10 ML: 5 INJECTION INTRAVENOUS at 08:12

## 2021-08-10 RX ADMIN — SODIUM CHLORIDE, PRESERVATIVE FREE 10 ML: 5 INJECTION INTRAVENOUS at 20:28

## 2021-08-10 RX ADMIN — DOCOSANOL: 100 CREAM TOPICAL at 10:47

## 2021-08-10 RX ADMIN — HYDROMORPHONE HYDROCHLORIDE 0.25 MG: 1 INJECTION, SOLUTION INTRAMUSCULAR; INTRAVENOUS; SUBCUTANEOUS at 21:28

## 2021-08-10 RX ADMIN — BUMETANIDE 2 MG: 0.25 INJECTION INTRAMUSCULAR; INTRAVENOUS at 17:22

## 2021-08-10 RX ADMIN — HYDROCODONE BITARTRATE AND ACETAMINOPHEN 1 TABLET: 5; 325 TABLET ORAL at 08:31

## 2021-08-10 RX ADMIN — INSULIN ASPART 12 UNITS: 100 INJECTION, SOLUTION INTRAVENOUS; SUBCUTANEOUS at 10:47

## 2021-08-10 RX ADMIN — EPOETIN ALFA-EPBX 10000 UNITS: 10000 INJECTION, SOLUTION INTRAVENOUS; SUBCUTANEOUS at 17:23

## 2021-08-10 RX ADMIN — INSULIN DETEMIR 10 UNITS: 100 INJECTION, SOLUTION SUBCUTANEOUS at 22:22

## 2021-08-10 RX ADMIN — CEFEPIME HYDROCHLORIDE 2 G: 2 INJECTION, POWDER, FOR SOLUTION INTRAVENOUS at 00:45

## 2021-08-10 RX ADMIN — HYDROCODONE BITARTRATE AND ACETAMINOPHEN 1 TABLET: 5; 325 TABLET ORAL at 19:35

## 2021-08-10 RX ADMIN — ATORVASTATIN CALCIUM 40 MG: 40 TABLET, FILM COATED ORAL at 08:11

## 2021-08-10 RX ADMIN — OXYBUTYNIN CHLORIDE 5 MG: 5 TABLET, EXTENDED RELEASE ORAL at 08:12

## 2021-08-10 RX ADMIN — HYDROMORPHONE HYDROCHLORIDE 0.25 MG: 1 INJECTION, SOLUTION INTRAMUSCULAR; INTRAVENOUS; SUBCUTANEOUS at 15:22

## 2021-08-10 RX ADMIN — DOCOSANOL: 100 CREAM TOPICAL at 22:23

## 2021-08-10 RX ADMIN — METOPROLOL TARTRATE 100 MG: 50 TABLET, FILM COATED ORAL at 20:29

## 2021-08-10 RX ADMIN — METOPROLOL TARTRATE 100 MG: 50 TABLET, FILM COATED ORAL at 08:11

## 2021-08-10 RX ADMIN — GABAPENTIN 800 MG: 400 CAPSULE ORAL at 08:11

## 2021-08-10 RX ADMIN — GABAPENTIN 800 MG: 400 CAPSULE ORAL at 20:27

## 2021-08-10 NOTE — PROGRESS NOTES
"[facility]  [address] [place] [address]. [address]  T - [phone]  F - [phone]     Progress Note          PATIENT  DEMOGRAPHICS   PATIENT NAME: Yamileth Slater                      PHYSICIAN: BRIDGETT Hadley  : 1960  MRN: [id number]   LOS: 7 days    Patient Care Team:  Rianna Macias MD as PCP - General (Family Medicine)  Subjective   SUBJECTIVE   No acute events overnight.         Objective   OBJECTIVE   Vital Signs  Temp:  [96.8 °F (36 °C)-98.1 °F (36.7 °C)] 96.8 °F (36 °C)  Heart Rate:  [55-65] 62  Resp:  [16-18] 18  BP: (140-168)/(60-81) 168/72    Flowsheet Rows      First Filed Value   Admission Height  167.6 cm (66\") Documented at 2021   Admission Weight  127 kg (280 lb) Documented at 2021           I/O last 3 completed shifts:  In: 1160 [P.O.:1060; IV Piggyback:100]  Out: 3800 [Urine:3600; Emesis/NG output:200]    PHYSICAL EXAM    Physical Exam  Constitutional:       General: She is not in acute distress.     Appearance: She is obese. She is ill-appearing.   HENT:      Head: Normocephalic and atraumatic.   Cardiovascular:      Rate and Rhythm: Normal rate and regular rhythm.   Pulmonary:      Effort: Pulmonary effort is normal.      Breath sounds: Normal breath sounds.   Abdominal:      General: Bowel sounds are normal. There is distension.   Musculoskeletal:      Right lower leg: Edema present.      Left lower leg: Edema present.   Skin:     General: Skin is warm and dry.      Coloration: Skin is pale.   Neurological:      General: No focal deficit present.      Mental Status: She is alert and oriented to person, place, and time.         RESULTS   Results Review:    Results from last 7 days   Lab Units 08/10/21  0525 21  0533 21  0453   SODIUM mmol/L 137 132* 131*   POTASSIUM mmol/L 4.6 4.1 4.5   CHLORIDE mmol/L 99 98 97*   CO2 mmol/L 29.0 27.0 21.0*   BUN mg/dL 45* 42* 43*   CREATININE mg/dL 2.37* 2.02* 2.10*   CALCIUM " mg/dL 8.4* 8.6 8.8   GLUCOSE mg/dL 171* 260* 286*       Estimated Creatinine Clearance: 34.3 mL/min (A) (by C-G formula based on SCr of 2.37 mg/dL (H)).                Results from last 7 days   Lab Units 08/10/21  0525 08/09/21  0533 08/08/21  0453 08/07/21  0649 08/06/21  0613   WBC 10*3/mm3 9.10 8.40 8.64 8.51 8.46   HEMOGLOBIN g/dL 7.8* 7.7* 7.7* 7.3* 7.3*   PLATELETS 10*3/mm3 295 295 300 291 311               Imaging Results (Last 24 Hours)     ** No results found for the last 24 hours. **           MEDICATIONS    acidophilus, 1 tablet, Oral, Daily  aspirin, 81 mg, Oral, Daily  atorvastatin, 40 mg, Oral, Daily  bumetanide, 2 mg, Intravenous, BID  cholecalciferol, 50,000 Units, Oral, Weekly  clopidogrel, 75 mg, Oral, Daily  docosanol, , Topical, 5x Daily  DULoxetine, 20 mg, Oral, Daily  gabapentin, 800 mg, Oral, TID  heparin (porcine), 5,000 Units, Subcutaneous, Q12H  insulin aspart, 0-24 Units, Subcutaneous, TID AC  insulin detemir, 55 Units, Subcutaneous, Q12H  isosorbide mononitrate, 30 mg, Oral, Daily  metoprolol tartrate, 100 mg, Oral, Q12H  oxybutynin XL, 5 mg, Oral, Daily  sodium bicarbonate, 650 mg, Oral, TID  sodium chloride, 10 mL, Intravenous, Q12H           Assessment/Plan   ASSESSMENT / PLAN      Cellulitis of left leg    1.  ERIKA on CKD 4- Baseline creatinine around 2.0, up to 2.7 peak. Now 2.1-2.0mg/dL and up to 2.3 today.    -Keep IV bumex 2mg IV bid, observe cr for now     2.  Hyponatremia- sodium stable- fluid restriction 1000cc/day.      3.  Left lower extremity cellulitis- on vancomycin, flagyl and cefepime- per primary team, failed previous antibiotic course. Has u/s of leg didn't show abscess. Blood culture is negative at 5 days. CRP coming down 6.47.      4.  History of CAD     5.  History of COPD     6.  Anemia- hemoglobin low. Anemia is consistent with fe def / anemia of chronic disease and on IV Fe. Also has low b12 in OSH and received b12 IM, add epogen     7. Vitamin D  deficiency- on vitamin d 50K weekly     8. Metabolic Acidosis- on sodium bicarb, will discontinue         This document has been electronically signed by BRIDGETT Hadley on August 10, 2021 13:25 CDT      For this patient encounter, I have reviewed the Nurse Practitioner's documentation, medical decision making, and treatment plan and personally spent time with the patient.

## 2021-08-10 NOTE — PLAN OF CARE
Problem: Adult Inpatient Plan of Care  Goal: Plan of Care Review  Flowsheets  Taken 8/10/2021 1303  Progress: improving  Plan of Care Reviewed With: patient  Taken 8/10/2021 0840  Progress: improving  Plan of Care Reviewed With: patient  Outcome Summary: Pt agreed to OT this am. Pt performed B UE exercises with 1 lb wt 2 sets x15 reps. Pt required RB's. Pt educated on home safety/fall prevention. No new goals met. Continue OT POC   Goal Outcome Evaluation:  Plan of Care Reviewed With: patient        Progress: improving  Outcome Summary: Pt agreed to OT this am. Pt performed B UE exercises with 1 lb wt 2 sets x15 reps. Pt required RB's. Pt educated on home safety/fall prevention. No new goals met. Continue OT POC

## 2021-08-10 NOTE — PLAN OF CARE
Goal Outcome Evaluation:  Plan of Care Reviewed With: patient        Progress: improving  Outcome Summary: pt sitting in chair upon arrival, pt sit<>stand with SBA, pt ambulated 20` x 2 with RW & CGA. pt would benefit from 24/7 care & HHPT @ D/C

## 2021-08-10 NOTE — PLAN OF CARE
Goal Outcome Evaluation:  Plan of Care Reviewed With: caregiver, patient        Progress: no change  Outcome Summary: Pt continues to eat well.  Still being managed for ERIKA/CKD and Cellulitis.  Education provided.

## 2021-08-10 NOTE — THERAPY TREATMENT NOTE
Acute Care - Physical Therapy Treatment Note  [facility]     Patient Name: Yamileth Slater  : 1960  MRN: [id number]  Today's Date: 8/10/2021      Visit Dx:     ICD-10-CM ICD-9-CM   1. Cellulitis of left leg  L03.116 682.6   2. Anemia, unspecified type  D64.9 285.9   3. Chronic kidney disease, unspecified CKD stage  N18.9 585.9   4. Type 2 diabetes mellitus with hyperglycemia, unspecified whether long term insulin use (CMS/[facility])  E11.65 250.00   5. Impaired mobility and ADLs  Z74.09 V49.89    Z78.9      Patient Active Problem List   Diagnosis   • Type 2 diabetes mellitus with diabetic polyneuropathy, with long-term current use of insulin (CMS/[facility])   • Chronic obstructive pulmonary disease (CMS/[facility])   • Chronic midline low back pain without sciatica   • Vitamin D deficiency   • Type 2 diabetes mellitus (CMS/[facility])   • Tobacco dependence syndrome   • Surgical follow-up care   • Shoulder joint pain   • Peripheral vascular disease (CMS/[facility])   • Pain   • Neurologic disorder associated with diabetes mellitus (CMS/[facility])   • Morbid obesity with BMI of 50.0-59.9, adult (CMS/[facility])   • Mixed hyperlipidemia   • Kidney stone   • History of colon polyps   • GERD (gastroesophageal reflux disease)   • Excoriated eczema   • Essential hypertension   • Encounter for medication refill   • Dyslipidemia   • Diabetes mellitus (CMS/[facility])   • Coronary artery disease   • Chronic obstructive lung disease (CMS/[facility])   • Chronic folliculitis   • Chest pain   • Mild persistent asthma   • 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   • Uncontrolled type 2 diabetes mellitus with complication, with long-term current use of insulin (CMS/[facility])   • Anemia   • Hypothyroidism   • Carotid artery disease (CMS/[facility])   • Current smoker   • DM type 2 with diabetic peripheral neuropathy (CMS/[facility])   • [place] abuse   • PAD (peripheral artery disease) (CMS/[facility])   • S/P CABG x 3   • Intercostal pain   • Venous insufficiency   • Dyspnea on  exertion   • LAFB (left anterior fascicular block)   • Pulmonary hypertension (CMS/[facility])   • Left hip pain   • Neck pain   • Acute kidney injury superimposed on chronic kidney disease (CMS/[facility])   • MIKE and COPD overlap syndrome (CMS/[facility])   • Pneumonia due to COVID-19 virus   • CKD (chronic kidney disease) stage 4, GFR 15-29 ml/min (CMS/[facility])   • Morbid obesity (CMS/[facility])   • Type 2 diabetes mellitus with hyperglycemia, with long-term current use of insulin (CMS/[facility])   • Hyponatremia   • Hyperkalemia   • Anemia   • Cutaneous candidiasis   • Community acquired pneumonia of right middle lobe of lung   • MRSA infection   • Alkaline phosphatase elevation   • COVID-19 ruled out by laboratory testing   • Esophageal dysphagia   • Monilial esophagitis (CMS/[facility])   • NSTEMI, initial episode of care (CMS/[facility])   • CAD (coronary artery disease)   • Gastrointestinal hemorrhage   • Chronic respiratory failure with hypoxia (CMS/[facility])   • Pneumonia due to infectious organism   • Chronic hypercapnic respiratory failure (CMS/[facility])   • Cellulitis of left leg     Past Medical History:   Diagnosis Date   • Anxiety    • Carotid artery stenosis    • Chronic obstructive lung disease (CMS/[facility])    • CKD (chronic kidney disease) stage 4, GFR 15-29 ml/min (CMS/[facility])    • Colonic polyp    • Coronary arteriosclerosis    • Diabetes mellitus (CMS/[facility])    • Diabetic neuropathy (CMS/[facility])    • GERD (gastroesophageal reflux disease)    • History of transfusion    • Hypercholesterolemia    • Hypertension    • Hypomagnesemia 6/27/2021    Monitor and replace   • Morbid obesity (CMS/[facility])    • Nephrolithiasis    • Peripheral vascular disease (CMS/[facility])    • Sleep apnea    • Substance abuse (CMS/[facility])    • Vitamin D deficiency      Past Surgical History:   Procedure Laterality Date   • CARDIAC CATHETERIZATION N/A 7/14/2020   • CARDIAC CATHETERIZATION N/A 4/23/2021    Procedure: Left Heart Cath;  Surgeon: Melba Romo MD;  Location: [facility] CATH INVASIVE  LOCATION;  Service: Cardiology;  Laterality: N/A;   • CARDIAC CATHETERIZATION N/A 4/30/2021    Procedure: Percutaneous Coronary Intervention;  Surgeon: Russell Voss MD;  Location: [facility] CATH INVASIVE LOCATION;  Service: Cardiovascular;  Laterality: N/A;   • CARDIAC CATHETERIZATION N/A 4/30/2021    Procedure: Stent NIKKI coronary;  Surgeon: Russell Voss MD;  Location: [facility] CATH INVASIVE LOCATION;  Service: Cardiovascular;  Laterality: N/A;   • CAROTID STENT Left    • COLONOSCOPY     • COLONOSCOPY N/A 5/14/2021    Procedure: COLONOSCOPY;  Surgeon: Mingo Duarte MD;  Location: [facility] ENDOSCOPY;  Service: Gastroenterology;  Laterality: N/A;   • CORONARY ARTERY BYPASS GRAFT N/A 2013    CABG X 3   • CYSTOSCOPY BLADDER STONE LITHOTRIPSY Bilateral    • ENDOSCOPY N/A 4/12/2021    Procedure: ESOPHAGOGASTRODUODENOSCOPY;  Surgeon: Mingo Durate MD;  Location: [facility] ENDOSCOPY;  Service: Gastroenterology;  Laterality: N/A;   • ENDOSCOPY N/A 5/14/2021    Procedure: ESOPHAGOGASTRODUODENOSCOPY;  Surgeon: Mingo Duarte MD;  Location: [facility] ENDOSCOPY;  Service: Gastroenterology;  Laterality: N/A;        PT Assessment (last 12 hours)      PT Evaluation and Treatment     Row Name 08/10/21 7839          Physical Therapy Time and Intention    Subjective Information  complains of;pain  -TA     Document Type  therapy note (daily note)  -TA     Mode of Treatment  individual therapy;physical therapy  -TA     Patient Effort  good  -TA     Row Name 08/10/21 5611          General Information    Patient Profile Reviewed  yes  -TA     Existing Precautions/Restrictions  fall;oxygen therapy device and L/min  -TA     Row Name 08/10/21 8759          Cognition    Affect/Mental Status (Cognitive)  WFL  -TA     Orientation Status (Cognition)  oriented x 4  -TA     Follows Commands (Cognition)  WFL  -TA     Personal Safety Interventions  fall prevention program maintained;gait belt;muscle strengthening  facilitated;nonskid shoes/slippers when out of bed;supervised activity  -TA     Row Name 08/10/21 1555          Pain Scale: Numbers Pre/Post-Treatment    Pretreatment Pain Rating  6/10  -TA     Posttreatment Pain Rating  7/10  -TA     Pain Location - Side  Left  -TA     Pain Location - Orientation  lower  -TA     Pain Location  extremity  -TA     Row Name 08/10/21 1555          Bed Mobility    Scooting/Bridging [place] (Bed Mobility)  not tested  -TA     Supine-Sit [place] (Bed Mobility)  not tested  -TA     Sit-Supine [place] (Bed Mobility)  not tested  -TA     Row Name 08/10/21 1555          Transfers    Sit-Stand [place] (Transfers)  standby assist  -TA     Stand-Sit [place] (Transfers)  standby assist  -TA     [place] Level (Toilet Transfer)  contact guard  -TA     Assistive Device (Toilet Transfer)  commode, bedside with drop arms;walker, front-wheeled  -TA     Row Name 08/10/21 1555          Sit-Stand Transfer    Assistive Device (Sit-Stand Transfers)  walker, front-wheeled  -TA     Row Name 08/10/21 1555          Stand-Sit Transfer    Assistive Device (Stand-Sit Transfers)  walker, front-wheeled  -TA     Row Name 08/10/21 1555          Toilet Transfer    Type (Toilet Transfer)  sit-stand;stand-sit  -TA     Row Name 08/10/21 1555          Gait/Stairs (Locomotion)    [place] Level (Gait)  contact guard  -TA     Assistive Device (Gait)  walker, front-wheeled  -TA     Distance in Feet (Gait)  20` x 2  -TA     Row Name 08/10/21 1555          Hip (Therapeutic Exercise)    Hip (Therapeutic Exercise)  AROM (active range of motion);isometric exercises  -TA     Hip AROM (Therapeutic Exercise)  bilateral;flexion;aBduction;aDduction;sitting 2/20   -TA     Hip Isometrics (Therapeutic Exercise)  bilateral;gluteal sets;sitting;2 sets 20  -TA     Row Name 08/10/21 1555          Knee (Therapeutic Exercise)    Knee (Therapeutic Exercise)  AROM (active range of motion)  -TA     Knee AROM  (Therapeutic Exercise)  bilateral;LAQ (long arc quad);sitting;2 sets 20  -TA     Row Name 08/10/21 1551          Ankle (Therapeutic Exercise)    Ankle (Therapeutic Exercise)  AROM (active range of motion)  -TA     Ankle AROM (Therapeutic Exercise)  bilateral;dorsiflexion;plantarflexion;sitting;2 sets 20  -TA     Row Name 08/10/21 1553          Plan of Care Review    Plan of Care Reviewed With  patient  -TA     Progress  improving  -TA     Outcome Summary  pt sitting in chair upon arrival, pt sit<>stand with SBA, pt ambulated 20` x 2 with RW & CGA. pt would benefit from 24/7 care & HHPT @ D/C  -TA     Row Name 08/10/21 1558          Vital Signs    Pre Systolic BP Rehab  175  -TA     Pre Treatment Diastolic BP  73  -TA     Post Systolic BP Rehab  151  -TA     Post Treatment Diastolic BP  67  -TA     Pretreatment Heart Rate (beats/min)  65  -TA     Posttreatment Heart Rate (beats/min)  73  -TA     Pre SpO2 (%)  97  -TA     O2 Delivery Pre Treatment  supplemental O2  -TA     Post SpO2 (%)  97  -TA     O2 Delivery Post Treatment  supplemental O2  -TA     Pre Patient Position  Sitting  -TA     Post Patient Position  Sitting  -TA     Row Name 08/10/21 1551          Bed Mobility Goal 1 (PT)    Activity/Assistive Device (Bed Mobility Goal 1, PT)  sit to supine;supine to sit  -TA     [place] Level/Cues Needed (Bed Mobility Goal 1, PT)  independent  -TA     Time Frame (Bed Mobility Goal 1, PT)  by discharge  -TA     Progress/Outcomes (Bed Mobility Goal 1, PT)  goal met  -TA     Row Name 08/10/21 1552          Transfer Goal 1 (PT)    Activity/Assistive Device (Transfer Goal 1, PT)  sit-to-stand/stand-to-sit;bed-to-chair/chair-to-bed  -TA     [place] Level/Cues Needed (Transfer Goal 1, PT)  modified independence  -TA     Time Frame (Transfer Goal 1, PT)  by discharge  -TA     Progress/Outcome (Transfer Goal 1, PT)  goal not met  -TA     Row Name 08/10/21 1552          Gait Training Goal 1 (PT)    Activity/Assistive  Device (Gait Training Goal 1, PT)  gait (walking locomotion);assistive device use;backward stepping  -TA     [place] Level (Gait Training Goal 1, PT)  modified independence  -TA     Distance (Gait Training Goal 1, PT)  25'x2  -TA     Time Frame (Gait Training Goal 1, PT)  by discharge  -TA     Progress/Outcome (Gait Training Goal 1, PT)  goal not met  -TA     Row Name 08/10/21 1555          Positioning and Restraints    Pre-Treatment Position  sitting in chair/recliner  -TA     Post Treatment Position  chair  -TA     In Chair  sitting;call light within reach;exit alarm on;with family/caregiver  -TA     Row Name 08/10/21 1558          Therapy Assessment/Plan (PT)    Comment, Therapy Assessment/Plan (PT)  continue  -TA       User Key  (r) = Recorded By, (t) = Taken By, (c) = Cosigned By    Initials Name Provider Type    Sue Matute PTA Physical Therapy Assistant        Physical Therapy Education                 Title: PT OT SLP Therapies (Done)     Topic: Physical Therapy (Done)     Point: Mobility training (Done)     Learning Progress Summary           Patient Acceptance, E,TB, VU by LW at 8/6/2021 1259    Acceptance, E,TB, VU by LR at 8/4/2021 0821    Comment: Educated on PT POC and goals.                   Point: Home exercise program (Done)     Learning Progress Summary           Patient Acceptance, E,TB, VU by LW at 8/6/2021 1259    Acceptance, E,TB, VU by LR at 8/4/2021 0821    Comment: Educated on PT POC and goals.                   Point: Body mechanics (Done)     Learning Progress Summary           Patient Acceptance, E,TB, VU by LW at 8/6/2021 1259    Acceptance, E,TB, VU by LR at 8/4/2021 0821    Comment: Educated on PT POC and goals.                   Point: Precautions (Done)     Learning Progress Summary           Patient Acceptance, E,TB, VU by LW at 8/6/2021 1259    Acceptance, E,TB, VU by LR at 8/4/2021 0821    Comment: Educated on PT POC and goals.    Acceptance, E,TB, VU by SC at  7/31/2021 1007                               User Key     Initials Effective Dates Name Provider Type Discipline    LW 06/16/21 -  Rekha Perez COTA/L Occupational Therapy Assistant OT    LR 06/16/21 -  Lucien Gilman Physical Therapist PT    SC 07/21/21 -  Angela West RN Registered Nurse Nurse              PT Recommendation and Plan  Anticipated Discharge Disposition (PT): home with 24/7 care, home with home health, skilled nursing facility  Therapy Frequency (PT): other (see comments) (5-7d/week)  Plan of Care Reviewed With: patient  Progress: improving  Outcome Summary: pt sitting in chair upon arrival, pt sit<>stand with SBA, pt ambulated 20` x 2 with RW & CGA. pt would benefit from 24/7 care & HHPT @ D/C  Outcome Measures     Row Name 08/10/21 1600 08/09/21 1300 08/08/21 1514       How much help from another person do you currently need...    Turning from your back to your side while in flat bed without using bedrails?  4  -TA  4  -TA  3  -EM    Moving from lying on back to sitting on the side of a flat bed without bedrails?  4  -TA  4  -TA  3  -EM    Moving to and from a bed to a chair (including a wheelchair)?  3  -TA  3  -TA  3  -EM    Standing up from a chair using your arms (e.g., wheelchair, bedside chair)?  3  -TA  3  -TA  3  -EM    Climbing 3-5 steps with a railing?  3  -TA  3  -TA  3  -EM    To walk in hospital room?  3  -TA  3  -TA  3  -EM    AM-PAC 6 Clicks Score (PT)  20  -TA  20  -TA  18  -EM       Functional Assessment    Outcome Measure Options  AM-PAC 6 Clicks Basic Mobility (PT)  -TA  AM-PAC 6 Clicks Basic Mobility (PT)  -TA  AM-PAC 6 Clicks Basic Mobility (PT)  -EM      User Key  (r) = Recorded By, (t) = Taken By, (c) = Cosigned By    Initials Name Provider Type    EM Nadeem Patricia, RENETTA Physical Therapy Assistant    TA Sue Bird, RENETTA Physical Therapy Assistant           Time Calculation:   PT Charges     Row Name 08/10/21 1637             Time Calculation    Start Time  1555   -TA      Stop Time  1625  -TA      Time Calculation (min)  30 min  -TA         Time Calculation- PT    Total Timed Code Minutes- PT  30 minute(s)  -TA         Timed Charges    22092 - PT Therapeutic Exercise Minutes  20  -TA      72778 - PT Therapeutic Activity Minutes  10  -TA         Total Minutes    Timed Charges Total Minutes  30  -TA       Total Minutes  30  -TA        User Key  (r) = Recorded By, (t) = Taken By, (c) = Cosigned By    Initials Name Provider Type    Sue Matute PTA Physical Therapy Assistant        Therapy Charges for Today     Code Description Service Date Service Provider Modifiers Qty    77555797694 HC PT THER PROC EA 15 MIN 8/9/2021 Sue Bird, PTA GP 2    97532349545 HC PT THERAPEUTIC ACT EA 15 MIN 8/9/2021 Sue Bird, PTA GP 1    28647025365 HC PT THER PROC EA 15 MIN 8/10/2021 Sue Bird, PTA GP 1    45015193046 HC PT THERAPEUTIC ACT EA 15 MIN 8/10/2021 Sue Bird, PTA GP 1          PT G-Codes  Outcome Measure Options: AM-PAC 6 Clicks Basic Mobility (PT)  AM-PAC 6 Clicks Score (PT): 20  AM-PAC 6 Clicks Score (OT): 18    Sue Bird PTA  8/10/2021

## 2021-08-10 NOTE — PROGRESS NOTES
[facility] Medicine Services  INPATIENT PROGRESS NOTE    Length of Stay: 7  Date of Admission: 7/30/2021  Primary Care Physician: Rianna Macias MD    Subjective   Chief Complaint: Lower extremity cellulitis  HPI:     Cellulitis is improving  Edema is improving       Review of Systems     All pertinent negatives and positives are as above. All other systems have been reviewed and are negative unless otherwise stated.     Objective    Temp:  [96.8 °F (36 °C)-98.1 °F (36.7 °C)] 96.8 °F (36 °C)  Heart Rate:  [55-65] 62  Resp:  [18] 18  BP: (140-168)/(65-81) 168/72    Physical Exam  Constitutional:       General: She is not in acute distress.     Appearance: She is not toxic-appearing.   HENT:      Head: Normocephalic and atraumatic.      Right Ear: External ear normal.      Left Ear: External ear normal.      Nose: Nose normal.      Mouth/Throat:      Mouth: Mucous membranes are moist.      Pharynx: Oropharynx is clear.   Eyes:      Conjunctiva/sclera: Conjunctivae normal.   Cardiovascular:      Rate and Rhythm: Normal rate and regular rhythm.      Pulses: Normal pulses.      Heart sounds: Normal heart sounds.   Pulmonary:      Effort: Pulmonary effort is normal. No respiratory distress.      Breath sounds: Normal breath sounds.   Abdominal:      General: Bowel sounds are normal.      Palpations: Abdomen is soft.      Tenderness: There is no abdominal tenderness.   Musculoskeletal:         General: Swelling present.      Cervical back: Neck supple.   Skin:     General: Skin is warm.      Capillary Refill: Capillary refill takes less than 2 seconds.      Findings: Erythema present.      Comments: LLE induration, noted with erythema consistent with cellulitis. Slightly improved today   Neurological:      Mental Status: She is alert.   Psychiatric:         Mood and Affect: Mood normal.         Behavior: Behavior normal.       Results Review:  I have reviewed the labs, radiology  results, and diagnostic studies.    Laboratory Data:   Results from last 7 days   Lab Units 08/10/21  0525 08/09/21  0533 08/08/21  0453   SODIUM mmol/L 137 132* 131*   POTASSIUM mmol/L 4.6 4.1 4.5   CHLORIDE mmol/L 99 98 97*   CO2 mmol/L 29.0 27.0 21.0*   BUN mg/dL 45* 42* 43*   CREATININE mg/dL 2.37* 2.02* 2.10*   GLUCOSE mg/dL 171* 260* 286*   CALCIUM mg/dL 8.4* 8.6 8.8   ANION GAP mmol/L 9.0 7.0 13.0     Estimated Creatinine Clearance: 34.3 mL/min (A) (by C-G formula based on SCr of 2.37 mg/dL (H)).          Results from last 7 days   Lab Units 08/10/21  0525 08/09/21  0533 08/08/21  0453 08/07/21  0649 08/06/21  0613   WBC 10*3/mm3 9.10 8.40 8.64 8.51 8.46   HEMOGLOBIN g/dL 7.8* 7.7* 7.7* 7.3* 7.3*   HEMATOCRIT % 25.0* 25.0* 25.0* 23.4* 23.5*   PLATELETS 10*3/mm3 295 295 300 291 311           Culture Data:   No results found for: BLOODCX  No results found for: URINECX  No results found for: RESPCX  No results found for: WOUNDCX  No results found for: STOOLCX  No components found for: BODYFLD    Radiology Data:   Imaging Results (Last 24 Hours)     ** No results found for the last 24 hours. **          I have reviewed the patient's current medications.     Assessment/Plan     Active Hospital Problems    Diagnosis    • Cellulitis of left leg        Plan:      -Completed antibiotics   -IV diuretics per nephrology  -renal function is worse today   -Ultrasound was negative for abscess.  -Appreciate general surgery's help.   -Nephrology's been consulted given her renal disease and acute on chronic kidney disease.  Appreciate their help.  -COPD appears stable this time, continue current meds and interventions  -Anemia slightly worse but likely related to renal disease, continue to monitor for any active signs of bleeding.  IV iron ordered per Nephro.  -Continue current insulin dosing and glucose checks  -will taper down pain medications    -DVT prophylaxis with heparin  -CODE STATUS: Full    Dispo: home once renal  function improves and stable on oral diuretics     Cintia Simms MD

## 2021-08-10 NOTE — PLAN OF CARE
Problem: Adult Inpatient Plan of Care  Goal: Plan of Care Review  Outcome: Ongoing, Progressing  Flowsheets (Taken 8/10/2021 0659)  Outcome Summary: VSS. No acute distress. Cont to monitor  Goal: Patient-Specific Goal (Individualized)  Outcome: Ongoing, Progressing  Goal: Absence of Hospital-Acquired Illness or Injury  Outcome: Ongoing, Progressing  Intervention: Identify and Manage Fall Risk  Recent Flowsheet Documentation  Taken 8/10/2021 0501 by Shira Sandoval RN  Safety Promotion/Fall Prevention: safety round/check completed  Taken 8/9/2021 2300 by Shira Sandoval RN  Safety Promotion/Fall Prevention: safety round/check completed  Taken 8/9/2021 2100 by Shira Sandoval RN  Safety Promotion/Fall Prevention: safety round/check completed  Taken 8/9/2021 1900 by Shira Sandoval RN  Safety Promotion/Fall Prevention: safety round/check completed  Intervention: Prevent and Manage VTE (venous thromboembolism) Risk  Recent Flowsheet Documentation  Taken 8/9/2021 2022 by Shira Sandoval RN  VTE Prevention/Management: (heparin ) other (see comments)  Goal: Optimal Comfort and Wellbeing  Outcome: Ongoing, Progressing  Intervention: Provide Person-Centered Care  Recent Flowsheet Documentation  Taken 8/9/2021 2022 by Shira Sandoval RN  Trust Relationship/Rapport: care explained  Goal: Readiness for Transition of Care  Outcome: Ongoing, Progressing     Problem: Skin or Soft Tissue Infection  Goal: Infection Symptom Resolution  Outcome: Ongoing, Progressing     Problem: COPD Comorbidity  Goal: Maintenance of COPD Symptom Control  Outcome: Ongoing, Progressing     Problem: Hypertension Comorbidity  Goal: Blood Pressure in Desired Range  Outcome: Ongoing, Progressing     Problem: Fall Injury Risk  Goal: Absence of Fall and Fall-Related Injury  Outcome: Ongoing, Progressing  Intervention: Promote Injury-Free Environment  Recent Flowsheet Documentation  Taken 8/10/2021 0501 by Shira Sandoval RN  Safety Promotion/Fall  Prevention: safety round/check completed  Taken 8/9/2021 2300 by Shira Sandoval RN  Safety Promotion/Fall Prevention: safety round/check completed  Taken 8/9/2021 2100 by Shira Sandoval RN  Safety Promotion/Fall Prevention: safety round/check completed  Taken 8/9/2021 1900 by Shira Sandoval RN  Safety Promotion/Fall Prevention: safety round/check completed     Problem: Diabetes Comorbidity  Goal: Blood Glucose Level Within Desired Range  Outcome: Ongoing, Progressing  Intervention: Maintain Glycemic Control  Recent Flowsheet Documentation  Taken 8/9/2021 2022 by Shira Sandoval RN  Glycemic Management: blood glucose monitoring     Problem: Obstructive Sleep Apnea Risk or Actual (Comorbidity Management)  Goal: Unobstructed Breathing During Sleep  Outcome: Ongoing, Progressing   Goal Outcome Evaluation:              Outcome Summary: VSS. No acute distress. Cont to monitor

## 2021-08-10 NOTE — PROGRESS NOTES
Leg appears to be improving. Some skin flaking. No fluctuance. Will sign off.          This document has been electronically signed by Huy Ramsey MD on August 10, 2021 11:54 CDT

## 2021-08-10 NOTE — PLAN OF CARE
Goal Outcome Evaluation:           Progress: improving  Outcome Summary: Pt had a shower today.  Up in chair for a couple of hours.  PRN pain medication given.  VSS.  Will continue to monitor.

## 2021-08-10 NOTE — CONSULTS
Adult Nutrition  Assessment    Patient Name:  Yamileth Slater  YOB: 1960  MRN: [id number]  Admit Date:  7/30/2021    Assessment Date:  8/10/2021    Comments:  Pt continues to eat well.  Still being managed for ERIKA/CKD (Nephrology following) and Cellulitis of the left leg.  Glucose elevated, covered by LEvemir and SSI, may need some meal coverage.  Bumex and abx continues.  RD encouraged dietary compliance of Low Sodium/ADA diet.  Also encouraged healthy eating for moderate wt loss.  Diet copies and contact number provided.    Monitor  Reason for Assessment     Row Name 08/10/21 164          Reason for Assessment    Reason For Assessment  follow-up protocol         Nutrition/Diet History     Row Name 08/10/21 1641          Nutrition/Diet History    Typical Food/Fluid Intake  Pt claims that she is doing better.  No complaints or requests.           Labs/Tests/Procedures/Meds     Row Name 08/10/21 5487          Labs/Procedures/Meds    Lab Results Reviewed  reviewed, pertinent     Lab Results Comments  /giselle 259/360/173/272; Bun 45; Creat 2.37        Diagnostic Tests/Procedures    Diagnostic Test/Procedure Reviewed  reviewed, pertinent     Diagnostic Test/Procedures Comments  abx; Nephrology        Medications    Pertinent Medications Reviewed  reviewed, pertinent     Pertinent Medications Comments  Vanc; Bumex; VitD; Epoetin; Levemir; SSI         Physical Findings     Row Name 08/10/21 7198          Physical Findings    Overall Physical Appearance  obese;on oxygen therapy     Skin  other (see comments);edema Cellulitis           Nutrition Prescription Ordered     Row Name 08/10/21 3819          Nutrition Prescription PO    Current PO Diet  Regular     Common Modifiers  Cardiac;Consistent Carbohydrate;Renal;Fluid Restriction     Fluid Restriction mL per Tray  250 mL     Fluid Restriction mL per Day  1000 mL         Evaluation of Received Nutrient/Fluid Intake     Row Name 08/10/21 1640          PO  Evaluation    Number of Meals  6     % PO Intake  83%               Electronically signed by:  Gissel Littlejohn, IGOR  08/10/21 16:51 CDT

## 2021-08-10 NOTE — THERAPY TREATMENT NOTE
Patient Name: Yamileth Slater  : 1960    MRN: [id number]                              Today's Date: 8/10/2021       Admit Date: 2021    Visit Dx:     ICD-10-CM ICD-9-CM   1. Cellulitis of left leg  L03.116 682.6   2. Anemia, unspecified type  D64.9 285.9   3. Chronic kidney disease, unspecified CKD stage  N18.9 585.9   4. Type 2 diabetes mellitus with hyperglycemia, unspecified whether long term insulin use (CMS/formerly [facility])  E11.65 250.00   5. Impaired mobility and ADLs  Z74.09 V49.89    Z78.9      Patient Active Problem List   Diagnosis   • Type 2 diabetes mellitus with diabetic polyneuropathy, with long-term current use of insulin (CMS/formerly [facility])   • Chronic obstructive pulmonary disease (CMS/formerly [facility])   • Chronic midline low back pain without sciatica   • Vitamin D deficiency   • Type 2 diabetes mellitus (CMS/formerly [facility])   • Tobacco dependence syndrome   • Surgical follow-up care   • Shoulder joint pain   • Peripheral vascular disease (CMS/formerly [facility])   • Pain   • Neurologic disorder associated with diabetes mellitus (CMS/formerly [facility])   • Morbid obesity with BMI of 50.0-59.9, adult (CMS/formerly [facility])   • Mixed hyperlipidemia   • Kidney stone   • History of colon polyps   • GERD (gastroesophageal reflux disease)   • Excoriated eczema   • Essential hypertension   • Encounter for medication refill   • Dyslipidemia   • Diabetes mellitus (CMS/formerly [facility])   • Coronary artery disease   • Chronic obstructive lung disease (CMS/formerly [facility])   • Chronic folliculitis   • Chest pain   • Mild persistent asthma   • 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   • Uncontrolled type 2 diabetes mellitus with complication, with long-term current use of insulin (CMS/formerly [facility])   • Anemia   • Hypothyroidism   • Carotid artery disease (CMS/formerly [facility])   • Current smoker   • DM type 2 with diabetic peripheral neuropathy (CMS/formerly [facility])   • [place] abuse   • PAD (peripheral artery disease) (CMS/formerly [facility])   • S/P CABG x 3   • Intercostal pain   • Venous insufficiency   • Dyspnea on exertion    • LAFB (left anterior fascicular block)   • Pulmonary hypertension (CMS/[facility])   • Left hip pain   • Neck pain   • Acute kidney injury superimposed on chronic kidney disease (CMS/H[facility])   • MIKE and COPD overlap syndrome (CMS/[facility])   • Pneumonia due to COVID-19 virus   • CKD (chronic kidney disease) stage 4, GFR 15-29 ml/min (CMS/[facility])   • Morbid obesity (CMS/[facility])   • Type 2 diabetes mellitus with hyperglycemia, with long-term current use of insulin (CMS/[facility])   • Hyponatremia   • Hyperkalemia   • Anemia   • Cutaneous candidiasis   • Community acquired pneumonia of right middle lobe of lung   • MRSA infection   • Alkaline phosphatase elevation   • COVID-19 ruled out by laboratory testing   • Esophageal dysphagia   • Monilial esophagitis (CMS/[facility])   • NSTEMI, initial episode of care (CMS/[facility])   • CAD (coronary artery disease)   • Gastrointestinal hemorrhage   • Chronic respiratory failure with hypoxia (CMS/[facility])   • Pneumonia due to infectious organism   • Chronic hypercapnic respiratory failure (CMS/[facility])   • Cellulitis of left leg     Past Medical History:   Diagnosis Date   • Anxiety    • Carotid artery stenosis    • Chronic obstructive lung disease (CMS/[facility])    • CKD (chronic kidney disease) stage 4, GFR 15-29 ml/min (CMS/[facility])    • Colonic polyp    • Coronary arteriosclerosis    • Diabetes mellitus (CMS/[facility])    • Diabetic neuropathy (CMS/[facility])    • GERD (gastroesophageal reflux disease)    • History of transfusion    • Hypercholesterolemia    • Hypertension    • Hypomagnesemia 6/27/2021    Monitor and replace   • Morbid obesity (CMS/[facility])    • Nephrolithiasis    • Peripheral vascular disease (CMS/[facility])    • Sleep apnea    • Substance abuse (CMS/[facility])    • Vitamin D deficiency      Past Surgical History:   Procedure Laterality Date   • CARDIAC CATHETERIZATION N/A 7/14/2020   • CARDIAC CATHETERIZATION N/A 4/23/2021    Procedure: Left Heart Cath;  Surgeon: Melba Romo MD;  Location: [facility] CATH INVASIVE LOCATION;   Service: Cardiology;  Laterality: N/A;   • CARDIAC CATHETERIZATION N/A 4/30/2021    Procedure: Percutaneous Coronary Intervention;  Surgeon: Russell Voss MD;  Location: [facility] CATH INVASIVE LOCATION;  Service: Cardiovascular;  Laterality: N/A;   • CARDIAC CATHETERIZATION N/A 4/30/2021    Procedure: Stent NIKKI coronary;  Surgeon: Russell Voss MD;  Location: [facility] CATH INVASIVE LOCATION;  Service: Cardiovascular;  Laterality: N/A;   • CAROTID STENT Left    • COLONOSCOPY     • COLONOSCOPY N/A 5/14/2021    Procedure: COLONOSCOPY;  Surgeon: Mingo Duarte MD;  Location: [facility] ENDOSCOPY;  Service: Gastroenterology;  Laterality: N/A;   • CORONARY ARTERY BYPASS GRAFT N/A 2013    CABG X 3   • CYSTOSCOPY BLADDER STONE LITHOTRIPSY Bilateral    • ENDOSCOPY N/A 4/12/2021    Procedure: ESOPHAGOGASTRODUODENOSCOPY;  Surgeon: Mingo Duarte MD;  Location: [facility] ENDOSCOPY;  Service: Gastroenterology;  Laterality: N/A;   • ENDOSCOPY N/A 5/14/2021    Procedure: ESOPHAGOGASTRODUODENOSCOPY;  Surgeon: Mingo Duarte MD;  Location: [facility] ENDOSCOPY;  Service: Gastroenterology;  Laterality: N/A;     General Information     Row Name 08/10/21 0840          OT Time and Intention    Document Type  therapy note (daily note)  -BB     Mode of Treatment  individual therapy;occupational therapy  -BB     Row Name 08/10/21 0840          General Information    Patient Profile Reviewed  yes  -BB     Existing Precautions/Restrictions  fall;oxygen therapy device and L/min  -BB     Row Name 08/10/21 0840          Cognition    Orientation Status (Cognition)  oriented x 4  -BB     Row Name 08/10/21 0840          Safety Issues, Functional Mobility    Impairments Affecting Function (Mobility)  balance;endurance/activity tolerance;coordination;shortness of breath;strength  -BB       User Key  (r) = Recorded By, (t) = Taken By, (c) = Cosigned By    Initials Name Provider Type    BB Nissa Bey COTA/AISHWARYA Occupational  Therapy Assistant          Mobility/ADL's     Row Name 08/10/21 0840          Bed Mobility    Bed Mobility  supine-sit;sit-supine;scooting/bridging  -BB     Scooting/Bridging [place] (Bed Mobility)  modified independence  -BB     Supine-Sit [place] (Bed Mobility)  modified independence  -BB     Sit-Supine [place] (Bed Mobility)  modified independence  -BB     Assistive Device (Bed Mobility)  bed rails;head of bed elevated  -BB     Row Name 08/10/21 0840          Transfers    Sit-Stand [place] (Transfers)  not tested  -BB     [place] Level (Toilet Transfer)  contact guard  -BB     Assistive Device (Toilet Transfer)  grab bars/safety frame;walker, front-wheeled;commode, bedside without drop arms over toilet  -BB     Row Name 08/10/21 0840          Toilet Transfer    Type (Toilet Transfer)  stand pivot/stand step  -BB     Row Name 08/10/21 0840          Functional Mobility    Functional Mobility- Ind. Level  contact guard assist  -BB     Functional Mobility- Device  rolling walker  -BB     Row Name 08/10/21 0840          Toileting Assessment/Training    [place] Level (Toileting)  perform perineal hygiene;dependent (less than 25% patient effort);adjust/manage clothing  -BB     Assistive Devices (Toileting)  -- commode over toilet  -BB     Position (Toileting)  supported standing  -BB     Row Name 08/10/21 0840          Grooming Assessment/Training    [place] Level (Grooming)  hair care, combing/brushing;oral care regimen;wash face, hands;modified independence  -BB     Position (Grooming)  edge of bed sitting  -BB     Row Name 08/10/21 0840          Upper Body Dressing Assessment/Training    [place] Level (Upper Body Dressing)  -- hospital gown   -BB       User Key  (r) = Recorded By, (t) = Taken By, (c) = Cosigned By    Initials Name Provider Type    BB Nissa Bey COTA/L Occupational Therapy Assistant        Obj/Interventions     Row Name 08/10/21 0840          Range of  Motion Comprehensive    General Range of Motion  no range of motion deficits identified  -BB     Row Name 08/10/21 0840          Strength Comprehensive (MMT)    General Manual Muscle Testing (MMT) Assessment  other (see comments)  -BB     Row Name 08/10/21 0840          Shoulder (Therapeutic Exercise)    Shoulder Strengthening (Therapeutic Exercise)  bilateral;flexion;extension;1 lb free weight;2 sets x15  -BB     Row Name 08/10/21 0840          Elbow/Forearm (Therapeutic Exercise)    Elbow/Forearm Strengthening (Therapeutic Exercise)  bilateral;flexion;extension;supination;pronation;1 lb free weight;2 sets x15 reps  -BB     Row Name 08/10/21 0840          Wrist (Therapeutic Exercise)    Wrist (Therapeutic Exercise)  AROM (active range of motion);strengthening exercise  -BB     Wrist AROM (Therapeutic Exercise)  bilateral;flexion;extension;2 sets x15  -BB     Wrist Strengthening (Therapeutic Exercise)  1 lb free weight  -BB     Row Name 08/10/21 0840          Hand (Therapeutic Exercise)    Hand (Therapeutic Exercise)  AROM (active range of motion)  -BB     Hand AROM/AAROM (Therapeutic Exercise)  bilateral;finger flexion;finger extension;2 sets x15  -BB     Row Name 08/10/21 0840          Therapeutic Exercise    Therapeutic Exercise  shoulder;elbow/forearm;wrist;hand  -BB       User Key  (r) = Recorded By, (t) = Taken By, (c) = Cosigned By    Initials Name Provider Type    BB Nissa Bey COTA/L Occupational Therapy Assistant        Goals/Plan     Row Name 08/10/21 0840          Transfer Goal 1 (OT)    Activity/Assistive Device (Transfer Goal 1, OT)  toilet  -BB     [place] Level/Cues Needed (Transfer Goal 1, OT)  supervision required  -BB     Time Frame (Transfer Goal 1, OT)  long term goal (LTG);by discharge  -BB     Progress/Outcome (Transfer Goal 1, OT)  goal not met  -BB     Row Name 08/10/21 0840          Bathing Goal 1 (OT)    Activity/Device (Bathing Goal 1, OT)  upper body bathing AD as needed   -BB     [place] Level/Cues Needed (Bathing Goal 1, OT)  set-up required;supervision required  -BB     Time Frame (Bathing Goal 1, OT)  long term goal (LTG);by discharge  -BB     Progress/Outcomes (Bathing Goal 1, OT)  goal not met  -BB     Row Name 08/10/21 0840          Dressing Goal 1 (OT)    Activity/Device (Dressing Goal 1, OT)  upper body dressing AD as needed  -BB     [place]/Cues Needed (Dressing Goal 1, OT)  supervision required;set-up required  -BB     Time Frame (Dressing Goal 1, OT)  long term goal (LTG);by discharge  -BB     Progress/Outcome (Dressing Goal 1, OT)  goal not met  -BB     Row Name 08/10/21 0840          Toileting Goal 1 (OT)    Activity/Device (Toileting Goal 1, OT)  toileting skills, all  -BB     [place] Level/Cues Needed (Toileting Goal 1, OT)  set-up required;supervision required  -BB     Time Frame (Toileting Goal 1, OT)  long term goal (LTG);by discharge  -BB     Progress/Outcome (Toileting Goal 1, OT)  goal not met  -BB       User Key  (r) = Recorded By, (t) = Taken By, (c) = Cosigned By    Initials Name Provider Type    BB Nissa Bey COTA/L Occupational Therapy Assistant        Clinical Impression     Row Name 08/10/21 0840          Pain Scale: Numbers Pre/Post-Treatment    Pretreatment Pain Rating  10/10  -BB     Posttreatment Pain Rating  10/10  -BB     Pain Location - Side  Left  -BB     Pain Location - Orientation  lower  -BB     Pain Location  extremity  -BB     Pain Intervention(s)  Medication (See MAR)  -BB     Row Name 08/10/21 0840          Plan of Care Review    Plan of Care Reviewed With  patient  -BB     Progress  improving  -BB     Outcome Summary  Pt agreed to OT this am. Pt performed B UE exercises with 1 lb wt 2 sets x15 reps. Pt required RB's. Pt educated on home safety/fall prevention. No new goals met. Continue OT POC  -BB     Row Name 08/10/21 0840          Therapy Assessment/Plan (OT)    Rehab Potential (OT)  good, to achieve stated  therapy goals  -BB     Criteria for Skilled Therapeutic Interventions Met (OT)  yes;meets criteria;skilled treatment is necessary  -BB     Therapy Frequency (OT)  other (see comments) 5-7 days per week  -BB     Row Name 08/10/21 0840          Therapy Plan Review/Discharge Plan (OT)    Anticipated Discharge Disposition (OT)  home;home with assist;home with home health  -BB     Row Name 08/10/21 0840          Vital Signs    Pre Systolic BP Rehab  163  -BB     Pre Treatment Diastolic BP  71  -BB     Pretreatment Heart Rate (beats/min)  55  -BB     Pre SpO2 (%)  99  -BB     O2 Delivery Pre Treatment  supplemental O2  -BB     Pre Patient Position  Supine  -BB     Row Name 08/10/21 0840          Positioning and Restraints    Pre-Treatment Position  in bed  -BB     Post Treatment Position  bed  -BB     In Bed  fowlers;call light within reach;encouraged to call for assist;exit alarm on  -BB       User Key  (r) = Recorded By, (t) = Taken By, (c) = Cosigned By    Initials Name Provider Type    Nissa Guillermo COTA/L Occupational Therapy Assistant        Outcome Measures     Row Name 08/10/21 0840          How much help from another is currently needed...    Putting on and taking off regular lower body clothing?  2  -BB     Bathing (including washing, rinsing, and drying)  2  -BB     Toileting (which includes using toilet bed pan or urinal)  2  -BB     Putting on and taking off regular upper body clothing  4  -BB     Taking care of personal grooming (such as brushing teeth)  4  -BB     Eating meals  4  -BB     AM-PAC 6 Clicks Score (OT)  18  -BB       User Key  (r) = Recorded By, (t) = Taken By, (c) = Cosigned By    Initials Name Provider Type    Nissa Guillermo COTA/L Occupational Therapy Assistant          Occupational Therapy Education                 Title: PT OT SLP Therapies (Done)     Topic: Occupational Therapy (Done)     Point: ADL training (Done)     Description:   Instruct learner(s) on proper safety  adaptation and remediation techniques during self care or transfers.   Instruct in proper use of assistive devices.              Learning Progress Summary           Patient Acceptance, E,TB, VU by LW at 8/6/2021 1259    Acceptance, E,TB, VU,NR by  at 8/5/2021 0824    Comment: POC, role of OT, transfer training    Acceptance, E, VU by AS at 8/4/2021 1325    Comment: UE strengthening exercises    Acceptance, E,TB, VU by SC at 7/31/2021 1007                   Point: Home exercise program (Done)     Description:   Instruct learner(s) on appropriate technique for monitoring, assisting and/or progressing therapeutic exercises/activities.              Learning Progress Summary           Patient Acceptance, E,TB, VU by LW at 8/6/2021 1259    Acceptance, E, VU by AS at 8/4/2021 1325    Comment: UE strengthening exercises    Acceptance, E,TB, VU by SC at 7/31/2021 1007                   Point: Precautions (Done)     Description:   Instruct learner(s) on prescribed precautions during self-care and functional transfers.              Learning Progress Summary           Patient Acceptance, E,TB, VU by LW at 8/6/2021 1259    Acceptance, E,TB, VU,NR by  at 8/5/2021 0824    Comment: POC, role of OT, transfer training    Acceptance, E, VU by AS at 8/4/2021 1325    Comment: UE strengthening exercises    Acceptance, E, VU by ME at 8/3/2021 1508    Comment: Educated on OT and POC. Educated to call for assistance. Educated on safety precautions.    Acceptance, E,TB, VU by SC at 7/31/2021 1007                   Point: Body mechanics (Done)     Description:   Instruct learner(s) on proper positioning and spine alignment during self-care, functional mobility activities and/or exercises.              Learning Progress Summary           Patient Acceptance, E, VU by BB at 8/10/2021 1303    Acceptance, E,TB, VU by LW at 8/6/2021 1259    Acceptance, E,TB, VU,NR by  at 8/5/2021 0824    Comment: POC, role of OT, transfer training     Acceptance, E, VU by ME at 8/3/2021 1508    Comment: Educated on OT and POC. Educated to call for assistance. Educated on safety precautions.    Acceptance, E,TB, VU by SC at 7/31/2021 1007                               User Key     Initials Effective Dates Name Provider Type Discipline    BB 06/16/21 -  Nissa Bey COTA/L Occupational Therapy Assistant OT    LW 06/16/21 -  Rekha Perez OLMSTEAD/L Occupational Therapy Assistant OT    AS 03/18/21 -  Arti Navarrete, OT Occupational Therapist OT    ME 06/16/21 -  Nikole Espino OTR/L Occupational Therapist OT     06/14/21 -  Ottoniel Robles OT Occupational Therapist OT    SC 07/21/21 -  Angela West RN Registered Nurse Nurse              OT Recommendation and Plan  Therapy Frequency (OT): other (see comments) (5-7 days per week)  Plan of Care Review  Plan of Care Reviewed With: patient  Progress: improving  Outcome Summary: Pt agreed to OT this am. Pt performed B UE exercises with 1 lb wt 2 sets x15 reps. Pt required RB's. Pt educated on home safety/fall prevention. No new goals met. Continue OT POC     Time Calculation:   Time Calculation- OT     Row Name 08/10/21 1303             Time Calculation- OT    OT Start Time  0840  -BB      OT Stop Time  0934  -BB      OT Time Calculation (min)  54 min  -BB      Total Timed Code Minutes- OT  54 minute(s)  -BB      OT Received On  08/10/21  -BB         Timed Charges    75559 - OT Therapeutic Exercise Minutes  30  -BB      87507 - OT Self Care/Mgmt Minutes  24  -BB         Total Minutes    Timed Charges Total Minutes  54  -BB       Total Minutes  54  -BB        User Key  (r) = Recorded By, (t) = Taken By, (c) = Cosigned By    Initials Name Provider Type    BB Nissa Bey COTA/L Occupational Therapy Assistant        Therapy Charges for Today     Code Description Service Date Service Provider Modifiers Qty    45025151846 HC OT THER PROC EA 15 MIN 8/10/2021 Nissa Bey COTA/AISHWARYA GO 2     96792781132 HC OT SELF CARE/MGMT/TRAIN EA 15 MIN 8/10/2021 Nissa Bey COTA/L GO 2               DARBY Mcgovern  8/10/2021

## 2021-08-11 ENCOUNTER — TELEPHONE (OUTPATIENT)
Dept: FAMILY MEDICINE CLINIC | Facility: CLINIC | Age: 61
End: 2021-08-11

## 2021-08-11 LAB
ALBUMIN SERPL-MCNC: 2.6 G/DL (ref 3.5–5.2)
ALBUMIN/GLOB SERPL: 0.5 G/DL
ALP SERPL-CCNC: 134 U/L (ref 39–117)
ALT SERPL W P-5'-P-CCNC: 5 U/L (ref 1–33)
ANION GAP SERPL CALCULATED.3IONS-SCNC: 11 MMOL/L (ref 5–15)
ANION GAP SERPL CALCULATED.3IONS-SCNC: 12 MMOL/L (ref 5–15)
AST SERPL-CCNC: 12 U/L (ref 1–32)
BASOPHILS # BLD AUTO: 0.08 10*3/MM3 (ref 0–0.2)
BASOPHILS NFR BLD AUTO: 0.9 % (ref 0–1.5)
BILIRUB SERPL-MCNC: <0.2 MG/DL (ref 0–1.2)
BUN SERPL-MCNC: 47 MG/DL (ref 8–23)
BUN SERPL-MCNC: 47 MG/DL (ref 8–23)
BUN/CREAT SERPL: 18.9 (ref 7–25)
BUN/CREAT SERPL: 19.6 (ref 7–25)
CALCIUM SPEC-SCNC: 8.1 MG/DL (ref 8.6–10.5)
CALCIUM SPEC-SCNC: 8.2 MG/DL (ref 8.6–10.5)
CHLORIDE SERPL-SCNC: 95 MMOL/L (ref 98–107)
CHLORIDE SERPL-SCNC: 97 MMOL/L (ref 98–107)
CO2 SERPL-SCNC: 25 MMOL/L (ref 22–29)
CO2 SERPL-SCNC: 26 MMOL/L (ref 22–29)
CREAT SERPL-MCNC: 2.4 MG/DL (ref 0.57–1)
CREAT SERPL-MCNC: 2.49 MG/DL (ref 0.57–1)
DEPRECATED RDW RBC AUTO: 49.6 FL (ref 37–54)
EOSINOPHIL # BLD AUTO: 0.45 10*3/MM3 (ref 0–0.4)
EOSINOPHIL NFR BLD AUTO: 5.3 % (ref 0.3–6.2)
ERYTHROCYTE [DISTWIDTH] IN BLOOD BY AUTOMATED COUNT: 15.7 % (ref 12.3–15.4)
GFR SERPL CREATININE-BSD FRML MDRD: 20 ML/MIN/1.73
GFR SERPL CREATININE-BSD FRML MDRD: 20 ML/MIN/1.73
GLOBULIN UR ELPH-MCNC: 5 GM/DL
GLUCOSE BLDC GLUCOMTR-MCNC: 108 MG/DL (ref 70–130)
GLUCOSE BLDC GLUCOMTR-MCNC: 165 MG/DL (ref 70–130)
GLUCOSE BLDC GLUCOMTR-MCNC: 260 MG/DL (ref 70–130)
GLUCOSE BLDC GLUCOMTR-MCNC: 275 MG/DL (ref 70–130)
GLUCOSE BLDC GLUCOMTR-MCNC: 348 MG/DL (ref 70–130)
GLUCOSE BLDC GLUCOMTR-MCNC: 402 MG/DL (ref 70–130)
GLUCOSE BLDC GLUCOMTR-MCNC: 430 MG/DL (ref 70–130)
GLUCOSE BLDC GLUCOMTR-MCNC: 455 MG/DL (ref 70–130)
GLUCOSE BLDC GLUCOMTR-MCNC: 464 MG/DL (ref 70–130)
GLUCOSE SERPL-MCNC: 230 MG/DL (ref 65–99)
GLUCOSE SERPL-MCNC: 235 MG/DL (ref 65–99)
HCT VFR BLD AUTO: 25.6 % (ref 34–46.6)
HGB BLD-MCNC: 7.8 G/DL (ref 12–15.9)
IMM GRANULOCYTES # BLD AUTO: 0.13 10*3/MM3 (ref 0–0.05)
IMM GRANULOCYTES NFR BLD AUTO: 1.5 % (ref 0–0.5)
LYMPHOCYTES # BLD AUTO: 2.17 10*3/MM3 (ref 0.7–3.1)
LYMPHOCYTES NFR BLD AUTO: 25.6 % (ref 19.6–45.3)
MCH RBC QN AUTO: 26.7 PG (ref 26.6–33)
MCHC RBC AUTO-ENTMCNC: 30.5 G/DL (ref 31.5–35.7)
MCV RBC AUTO: 87.7 FL (ref 79–97)
MONOCYTES # BLD AUTO: 0.68 10*3/MM3 (ref 0.1–0.9)
MONOCYTES NFR BLD AUTO: 8 % (ref 5–12)
NEUTROPHILS NFR BLD AUTO: 4.96 10*3/MM3 (ref 1.7–7)
NEUTROPHILS NFR BLD AUTO: 58.7 % (ref 42.7–76)
NRBC BLD AUTO-RTO: 0 /100 WBC (ref 0–0.2)
PLATELET # BLD AUTO: 281 10*3/MM3 (ref 140–450)
PMV BLD AUTO: 8.6 FL (ref 6–12)
POTASSIUM SERPL-SCNC: 3.9 MMOL/L (ref 3.5–5.2)
POTASSIUM SERPL-SCNC: 3.9 MMOL/L (ref 3.5–5.2)
PROT SERPL-MCNC: 7.6 G/DL (ref 6–8.5)
RBC # BLD AUTO: 2.92 10*6/MM3 (ref 3.77–5.28)
SODIUM SERPL-SCNC: 132 MMOL/L (ref 136–145)
SODIUM SERPL-SCNC: 134 MMOL/L (ref 136–145)
WBC # BLD AUTO: 8.47 10*3/MM3 (ref 3.4–10.8)

## 2021-08-11 PROCEDURE — 63710000001 INSULIN DETEMIR PER 5 UNITS: Performed by: INTERNAL MEDICINE

## 2021-08-11 PROCEDURE — 82962 GLUCOSE BLOOD TEST: CPT

## 2021-08-11 PROCEDURE — 25010000002 HYDROMORPHONE 1 MG/ML SOLUTION: Performed by: INTERNAL MEDICINE

## 2021-08-11 PROCEDURE — 63710000001 INSULIN ASPART PER 5 UNITS: Performed by: INTERNAL MEDICINE

## 2021-08-11 PROCEDURE — 25010000002 HEPARIN (PORCINE) PER 1000 UNITS: Performed by: INTERNAL MEDICINE

## 2021-08-11 PROCEDURE — 97110 THERAPEUTIC EXERCISES: CPT

## 2021-08-11 PROCEDURE — 97530 THERAPEUTIC ACTIVITIES: CPT

## 2021-08-11 PROCEDURE — 85025 COMPLETE CBC W/AUTO DIFF WBC: CPT | Performed by: FAMILY MEDICINE

## 2021-08-11 PROCEDURE — 80053 COMPREHEN METABOLIC PANEL: CPT | Performed by: FAMILY MEDICINE

## 2021-08-11 RX ORDER — BUMETANIDE 0.25 MG/ML
2 INJECTION INTRAMUSCULAR; INTRAVENOUS DAILY
Status: DISCONTINUED | OUTPATIENT
Start: 2021-08-12 | End: 2021-08-11

## 2021-08-11 RX ORDER — ALBUMIN (HUMAN) 12.5 G/50ML
25 SOLUTION INTRAVENOUS DAILY
Status: DISCONTINUED | OUTPATIENT
Start: 2021-08-12 | End: 2021-08-12 | Stop reason: HOSPADM

## 2021-08-11 RX ORDER — BUMETANIDE 1 MG/1
1 TABLET ORAL 2 TIMES DAILY
Status: DISCONTINUED | OUTPATIENT
Start: 2021-08-12 | End: 2021-08-12 | Stop reason: HOSPADM

## 2021-08-11 RX ADMIN — ATORVASTATIN CALCIUM 40 MG: 40 TABLET, FILM COATED ORAL at 08:01

## 2021-08-11 RX ADMIN — OXYBUTYNIN CHLORIDE 5 MG: 5 TABLET, EXTENDED RELEASE ORAL at 08:02

## 2021-08-11 RX ADMIN — METOPROLOL TARTRATE 100 MG: 50 TABLET, FILM COATED ORAL at 21:17

## 2021-08-11 RX ADMIN — BUMETANIDE 2 MG: 0.25 INJECTION INTRAMUSCULAR; INTRAVENOUS at 05:56

## 2021-08-11 RX ADMIN — DOCOSANOL: 100 CREAM TOPICAL at 21:17

## 2021-08-11 RX ADMIN — HEPARIN SODIUM 5000 UNITS: 5000 INJECTION INTRAVENOUS; SUBCUTANEOUS at 08:02

## 2021-08-11 RX ADMIN — GABAPENTIN 800 MG: 400 CAPSULE ORAL at 16:55

## 2021-08-11 RX ADMIN — INSULIN ASPART 17 UNITS: 100 INJECTION, SOLUTION INTRAVENOUS; SUBCUTANEOUS at 10:47

## 2021-08-11 RX ADMIN — DOCOSANOL: 100 CREAM TOPICAL at 05:57

## 2021-08-11 RX ADMIN — DOCOSANOL: 100 CREAM TOPICAL at 10:47

## 2021-08-11 RX ADMIN — METOPROLOL TARTRATE 100 MG: 50 TABLET, FILM COATED ORAL at 08:01

## 2021-08-11 RX ADMIN — SODIUM CHLORIDE, PRESERVATIVE FREE 10 ML: 5 INJECTION INTRAVENOUS at 08:02

## 2021-08-11 RX ADMIN — SODIUM CHLORIDE, PRESERVATIVE FREE 10 ML: 5 INJECTION INTRAVENOUS at 21:13

## 2021-08-11 RX ADMIN — GABAPENTIN 800 MG: 400 CAPSULE ORAL at 08:02

## 2021-08-11 RX ADMIN — ASPIRIN 81 MG: 81 TABLET, FILM COATED ORAL at 08:01

## 2021-08-11 RX ADMIN — CLOPIDOGREL BISULFATE 75 MG: 75 TABLET ORAL at 08:01

## 2021-08-11 RX ADMIN — ISOSORBIDE MONONITRATE 30 MG: 30 TABLET, EXTENDED RELEASE ORAL at 08:01

## 2021-08-11 RX ADMIN — DULOXETINE HYDROCHLORIDE 20 MG: 20 CAPSULE, DELAYED RELEASE ORAL at 08:02

## 2021-08-11 RX ADMIN — INSULIN ASPART 17 UNITS: 100 INJECTION, SOLUTION INTRAVENOUS; SUBCUTANEOUS at 07:20

## 2021-08-11 RX ADMIN — HYDROMORPHONE HYDROCHLORIDE 0.25 MG: 1 INJECTION, SOLUTION INTRAMUSCULAR; INTRAVENOUS; SUBCUTANEOUS at 17:29

## 2021-08-11 RX ADMIN — HEPARIN SODIUM 5000 UNITS: 5000 INJECTION INTRAVENOUS; SUBCUTANEOUS at 21:12

## 2021-08-11 RX ADMIN — HYDROMORPHONE HYDROCHLORIDE 0.25 MG: 1 INJECTION, SOLUTION INTRAMUSCULAR; INTRAVENOUS; SUBCUTANEOUS at 08:17

## 2021-08-11 RX ADMIN — DOCOSANOL: 100 CREAM TOPICAL at 17:30

## 2021-08-11 RX ADMIN — Medication 1 TABLET: at 08:01

## 2021-08-11 RX ADMIN — INSULIN DETEMIR 55 UNITS: 100 INJECTION, SOLUTION SUBCUTANEOUS at 08:02

## 2021-08-11 RX ADMIN — GABAPENTIN 800 MG: 400 CAPSULE ORAL at 21:12

## 2021-08-11 NOTE — PROGRESS NOTES
"    [facility] Medicine Services  INPATIENT PROGRESS NOTE    Length of Stay: 8  Date of Admission: 7/30/2021  Primary Care Physician: Rianna Macias MD    Subjective   Chief Complaint: Leg pain and swelling  HPI: When asked, patient states that she feels \"terrible\" and attributes this to still being in the hospital with leg swelling.  She agrees that leg erythema, warmth, and swelling is markedly improved from her admission.  She denies chest pain, cough, congestion, shortness of breath.  We reviewed plan for transition of diuretics and hopefully to discharge home tomorrow.  She is agreeable.    Review of Systems   All pertinent negatives and positives are as above. All other systems have been reviewed and are negative unless otherwise stated.     Objective    Temp:  [96.2 °F (35.7 °C)-98 °F (36.7 °C)] 97.6 °F (36.4 °C)  Heart Rate:  [55-76] 63  Resp:  [18] 18  BP: (132-178)/(60-86) 138/60    Physical Exam  Vitals and nursing note reviewed.   Constitutional:       Appearance: Normal appearance. She is obese.   HENT:      Head: Normocephalic.   Cardiovascular:      Rate and Rhythm: Normal rate.      Pulses: Normal pulses.   Pulmonary:      Effort: Pulmonary effort is normal.      Breath sounds: Normal breath sounds.   Abdominal:      General: Bowel sounds are normal.      Palpations: Abdomen is soft.   Musculoskeletal:      Right lower leg: Edema present.      Left lower leg: Edema present.   Skin:     General: Skin is warm and dry.      Comments: LLE with faint distal erythema. No warmth, fluctuance   Neurological:      Mental Status: She is alert.             Results Review:  I have reviewed the labs, radiology results, and diagnostic studies.    Laboratory Data:   Results from last 7 days   Lab Units 08/11/21  0534 08/10/21  0525 08/09/21  0533   SODIUM mmol/L 132*  134* 137 132*   POTASSIUM mmol/L 3.9  3.9 4.6 4.1   CHLORIDE mmol/L 95*  97* 99 98   CO2 mmol/L 25.0  26.0 " 29.0 27.0   BUN mg/dL 47*  47* 45* 42*   CREATININE mg/dL 2.40*  2.49* 2.37* 2.02*   GLUCOSE mg/dL 230*  235* 171* 260*   CALCIUM mg/dL 8.2*  8.1* 8.4* 8.6   BILIRUBIN mg/dL <0.2  --   --    ALK PHOS U/L 134*  --   --    ALT (SGPT) U/L 5  --   --    AST (SGOT) U/L 12  --   --    ANION GAP mmol/L 12.0  11.0 9.0 7.0     Estimated Creatinine Clearance: 32.8 mL/min (A) (by C-G formula based on SCr of 2.49 mg/dL (H)).          Results from last 7 days   Lab Units 08/11/21  0534 08/10/21  0525 08/09/21  0533 08/08/21  0453 08/07/21  0649   WBC 10*3/mm3 8.47 9.10 8.40 8.64 8.51   HEMOGLOBIN g/dL 7.8* 7.8* 7.7* 7.7* 7.3*   HEMATOCRIT % 25.6* 25.0* 25.0* 25.0* 23.4*   PLATELETS 10*3/mm3 281 295 295 300 291           Culture Data:   No results found for: BLOODCX  No results found for: URINECX  No results found for: RESPCX  No results found for: WOUNDCX  No results found for: STOOLCX  No components found for: BODYFLD    Radiology Data:   Imaging Results (Last 24 Hours)     ** No results found for the last 24 hours. **          I have reviewed the patient's current medications.     Assessment/Plan       Cellulitis of left leg-With noted failure of previous of IV antibiotic course. LE duplex study negative for DVT. No abscess identified by US. Completed IV antibiotic course. Clinically improved on exam.      CKD IV-Nephrology notes reviewed. Creatinine approaching baseline. Will transition IV bumex to PO and recheck labs in AM. If stable, plan for dc home.       CAD-Currently stable without chest pain or reported equivalent.Noted abnormal troponin with stable EKG. Suspect this is related to renal insufficiency. Continue cardiac monitoring.        COPD-Stable without exacerbation.      Anemia-acute on chronic secondary to CKD. No reported blood loss.Epogen added by nephrology. H&H low but stable for past few days now.        Type 2 Diabetes Mellitus-Suboptimal control. Will increase levemir dosing for 110 to 120 units  daily.. Continue SSI/accuchecks        Discharge Planning: I expect patient to be discharged in next 24 hours.     Genevieve Raymundo MD      I confirmed that the patient's Advance Care Plan is present, code status is documented, or surrogate decision maker is listed in the patient's medical record.

## 2021-08-11 NOTE — THERAPY TREATMENT NOTE
Patient Name: Yamileth Slater  : 1960    MRN: [id number]                              Today's Date: 2021       Admit Date: 2021    Visit Dx:     ICD-10-CM ICD-9-CM   1. Cellulitis of left leg  L03.116 682.6   2. Anemia, unspecified type  D64.9 285.9   3. Chronic kidney disease, unspecified CKD stage  N18.9 585.9   4. Type 2 diabetes mellitus with hyperglycemia, unspecified whether long term insulin use (CMS/[facility])  E11.65 250.00   5. Impaired mobility and ADLs  Z74.09 V49.89    Z78.9      Patient Active Problem List   Diagnosis   • Type 2 diabetes mellitus with diabetic polyneuropathy, with long-term current use of insulin (CMS/[facility])   • Chronic obstructive pulmonary disease (CMS/[facility])   • Chronic midline low back pain without sciatica   • Vitamin D deficiency   • Type 2 diabetes mellitus (CMS/[facility])   • Tobacco dependence syndrome   • Surgical follow-up care   • Shoulder joint pain   • Peripheral vascular disease (CMS/[facility])   • Pain   • Neurologic disorder associated with diabetes mellitus (CMS/[facility])   • Morbid obesity with BMI of 50.0-59.9, adult (CMS/[facility])   • Mixed hyperlipidemia   • Kidney stone   • History of colon polyps   • GERD (gastroesophageal reflux disease)   • Excoriated eczema   • Essential hypertension   • Encounter for medication refill   • Dyslipidemia   • Diabetes mellitus (CMS/[facility])   • Coronary artery disease   • Chronic obstructive lung disease (CMS/[facility])   • Chronic folliculitis   • Chest pain   • Mild persistent asthma   • 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   • Uncontrolled type 2 diabetes mellitus with complication, with long-term current use of insulin (CMS/[facility])   • Anemia   • Hypothyroidism   • Carotid artery disease (CMS/[facility])   • Current smoker   • DM type 2 with diabetic peripheral neuropathy (CMS/[facility])   • [place] abuse   • PAD (peripheral artery disease) (CMS/[facility])   • S/P CABG x 3   • Intercostal pain   • Venous insufficiency   • Dyspnea on exertion    • LAFB (left anterior fascicular block)   • Pulmonary hypertension (CMS/[facility])   • Left hip pain   • Neck pain   • Acute kidney injury superimposed on chronic kidney disease (CMS/[facility])   • MIKE and COPD overlap syndrome (CMS/[facility])   • Pneumonia due to COVID-19 virus   • CKD (chronic kidney disease) stage 4, GFR 15-29 ml/min (CMS/[facility])   • Morbid obesity (CMS/[facility])   • Type 2 diabetes mellitus with hyperglycemia, with long-term current use of insulin (CMS/[facility])   • Hyponatremia   • Hyperkalemia   • Anemia   • Cutaneous candidiasis   • Community acquired pneumonia of right middle lobe of lung   • MRSA infection   • Alkaline phosphatase elevation   • COVID-19 ruled out by laboratory testing   • Esophageal dysphagia   • Monilial esophagitis (CMS/[facility])   • NSTEMI, initial episode of care (CMS/[facility])   • CAD (coronary artery disease)   • Gastrointestinal hemorrhage   • Chronic respiratory failure with hypoxia (CMS/[facility])   • Pneumonia due to infectious organism   • Chronic hypercapnic respiratory failure (CMS/[facility])   • Cellulitis of left leg     Past Medical History:   Diagnosis Date   • Anxiety    • Carotid artery stenosis    • Chronic obstructive lung disease (CMS/[facility])    • CKD (chronic kidney disease) stage 4, GFR 15-29 ml/min (CMS/[facility])    • Colonic polyp    • Coronary arteriosclerosis    • Diabetes mellitus (CMS/[facility])    • Diabetic neuropathy (CMS/[facility])    • GERD (gastroesophageal reflux disease)    • History of transfusion    • Hypercholesterolemia    • Hypertension    • Hypomagnesemia 6/27/2021    Monitor and replace   • Morbid obesity (CMS/[facility])    • Nephrolithiasis    • Peripheral vascular disease (CMS/[facility])    • Sleep apnea    • Substance abuse (CMS/[facility])    • Vitamin D deficiency      Past Surgical History:   Procedure Laterality Date   • CARDIAC CATHETERIZATION N/A 7/14/2020   • CARDIAC CATHETERIZATION N/A 4/23/2021    Procedure: Left Heart Cath;  Surgeon: Melba Romo MD;  Location: [facility] CATH INVASIVE LOCATION;   Service: Cardiology;  Laterality: N/A;   • CARDIAC CATHETERIZATION N/A 4/30/2021    Procedure: Percutaneous Coronary Intervention;  Surgeon: Russell Voss MD;  Location: [facility] CATH INVASIVE LOCATION;  Service: Cardiovascular;  Laterality: N/A;   • CARDIAC CATHETERIZATION N/A 4/30/2021    Procedure: Stent NIKKI coronary;  Surgeon: Russell Voss MD;  Location: [facility] CATH INVASIVE LOCATION;  Service: Cardiovascular;  Laterality: N/A;   • CAROTID STENT Left    • COLONOSCOPY     • COLONOSCOPY N/A 5/14/2021    Procedure: COLONOSCOPY;  Surgeon: Mingo Duarte MD;  Location: [facility] ENDOSCOPY;  Service: Gastroenterology;  Laterality: N/A;   • CORONARY ARTERY BYPASS GRAFT N/A 2013    CABG X 3   • CYSTOSCOPY BLADDER STONE LITHOTRIPSY Bilateral    • ENDOSCOPY N/A 4/12/2021    Procedure: ESOPHAGOGASTRODUODENOSCOPY;  Surgeon: Mingo Duarte MD;  Location: [facility] ENDOSCOPY;  Service: Gastroenterology;  Laterality: N/A;   • ENDOSCOPY N/A 5/14/2021    Procedure: ESOPHAGOGASTRODUODENOSCOPY;  Surgeon: Mingo uDarte MD;  Location: [facility] ENDOSCOPY;  Service: Gastroenterology;  Laterality: N/A;     General Information     Row Name 08/11/21 1100          OT Time and Intention    Document Type  therapy note (daily note)  -BB     Mode of Treatment  individual therapy;occupational therapy  -BB     Row Name 08/11/21 1100          General Information    Patient Profile Reviewed  yes  -BB     Existing Precautions/Restrictions  fall;oxygen therapy device and L/min  -BB     Row Name 08/11/21 1100          Cognition    Orientation Status (Cognition)  oriented x 4  -BB     Row Name 08/11/21 1100          Safety Issues, Functional Mobility    Impairments Affecting Function (Mobility)  balance;endurance/activity tolerance;coordination;shortness of breath;strength  -BB       User Key  (r) = Recorded By, (t) = Taken By, (c) = Cosigned By    Initials Name Provider Type    BB Nissa Bey COTA/AISHWARYA Occupational  Therapy Assistant          Mobility/ADL's     Row Name 08/11/21 1100          Bed Mobility    Bed Mobility  supine-sit;sit-supine;scooting/bridging  -     Scooting/Bridging [place] (Bed Mobility)  not tested  -     Supine-Sit [place] (Bed Mobility)  standby assist  -BB     Sit-Supine [place] (Bed Mobility)  standby assist  -BB     Assistive Device (Bed Mobility)  bed rails;head of bed elevated  -     Row Name 08/11/21 1100          Transfers    Sit-Stand [place] (Transfers)  standby assist  -BB     [facility] Name 08/11/21 1100          Sit-Stand Transfer    Assistive Device (Sit-Stand Transfers)  walker, front-wheeled  -[facility] Name 08/11/21 1100          Toileting Assessment/Training    Assistive Devices (Toileting)  -- commode over toilet  -[facility] Name 08/11/21 1100          Upper Body Dressing Assessment/Training    [place] Level (Upper Body Dressing)  -- hospital gown   -       User Key  (r) = Recorded By, (t) = Taken By, (c) = Cosigned By    Initials Name Provider Type    BB Nissa Bey COTA/L Occupational Therapy Assistant        Obj/Interventions     [facility] Name 08/11/21 1100          Range of Motion Comprehensive    General Range of Motion  no range of motion deficits identified  -[facility] Name 08/11/21 1100          Strength Comprehensive (MMT)    General Manual Muscle Testing (MMT) Assessment  other (see comments)  -[facility] Name 08/11/21 1100          Shoulder (Therapeutic Exercise)    Shoulder Strengthening (Therapeutic Exercise)  bilateral;flexion;extension;horizontal aBduction/aDduction;1 lb free weight 1x20  -[facility] Name 08/11/21 1100          Elbow/Forearm (Therapeutic Exercise)    Elbow/Forearm Strengthening (Therapeutic Exercise)  bilateral;flexion;extension;supination;pronation;sitting 1x20  -[facility] Name 08/11/21 1100          Wrist (Therapeutic Exercise)    Wrist (Therapeutic Exercise)  AROM (active range of motion)  -     Wrist AROM  (Therapeutic Exercise)  bilateral;flexion;extension 1x20  -BB     Row Name 08/11/21 1100          Hand (Therapeutic Exercise)    Hand AROM/AAROM (Therapeutic Exercise)  bilateral;finger flexion;finger extension 1x20  -BB       User Key  (r) = Recorded By, (t) = Taken By, (c) = Cosigned By    Initials Name Provider Type    BB Nissa Bey COTA/L Occupational Therapy Assistant        Goals/Plan     Row Name 08/11/21 1100          Transfer Goal 1 (OT)    Activity/Assistive Device (Transfer Goal 1, OT)  toilet  -BB     [place] Level/Cues Needed (Transfer Goal 1, OT)  supervision required  -BB     Time Frame (Transfer Goal 1, OT)  long term goal (LTG);by discharge  -BB     Progress/Outcome (Transfer Goal 1, OT)  goal not met  -BB     Row Name 08/11/21 1100          Bathing Goal 1 (OT)    Activity/Device (Bathing Goal 1, OT)  upper body bathing AD as needed  -BB     [place] Level/Cues Needed (Bathing Goal 1, OT)  set-up required;supervision required  -BB     Time Frame (Bathing Goal 1, OT)  long term goal (LTG);by discharge  -BB     Progress/Outcomes (Bathing Goal 1, OT)  goal not met  -BB     Row Name 08/11/21 1100          Dressing Goal 1 (OT)    Activity/Device (Dressing Goal 1, OT)  upper body dressing AD as needed  -BB     [place]/Cues Needed (Dressing Goal 1, OT)  supervision required;set-up required  -BB     Time Frame (Dressing Goal 1, OT)  long term goal (LTG);by discharge  -BB     Progress/Outcome (Dressing Goal 1, OT)  goal not met  -BB     Row Name 08/11/21 1100          Toileting Goal 1 (OT)    Activity/Device (Toileting Goal 1, OT)  toileting skills, all  -BB     [place] Level/Cues Needed (Toileting Goal 1, OT)  set-up required;supervision required  -BB     Time Frame (Toileting Goal 1, OT)  long term goal (LTG);by discharge  -BB     Progress/Outcome (Toileting Goal 1, OT)  goal not met  -BB       User Key  (r) = Recorded By, (t) = Taken By, (c) = Cosigned By    Initials Name  Provider Type    Nissa Guillermo COTA/L Occupational Therapy Assistant        Clinical Impression     Row Name 08/11/21 1100          Pain Scale: Numbers Pre/Post-Treatment    Pretreatment Pain Rating  6/10  -BB     Posttreatment Pain Rating  6/10  -BB     Pain Location - Side  Left  -BB     Pain Location - Orientation  lower  -BB     Pain Location  extremity  -BB     Pain Intervention(s)  Medication (See MAR)  -BB     Row Name 08/11/21 1100          Plan of Care Review    Plan of Care Reviewed With  patient  -BB     Progress  no change  -BB     Outcome Summary  Pt agrees to OT this am. Pt performs B UE exercises with 1 lb wt 1 set x20 reps. Pt's lunch tray arrived and tx ended. No new goals met. Continue OT POC  -BB     Row Name 08/11/21 1100          Therapy Assessment/Plan (OT)    Rehab Potential (OT)  good, to achieve stated therapy goals  -BB     Criteria for Skilled Therapeutic Interventions Met (OT)  yes;meets criteria;skilled treatment is necessary  -BB     Therapy Frequency (OT)  other (see comments) 5-7 days per week  -BB     Row Name 08/11/21 1100          Therapy Plan Review/Discharge Plan (OT)    Anticipated Discharge Disposition (OT)  home;home with assist;home with home health  -BB     Row Name 08/11/21 1100          Vital Signs    Pretreatment Heart Rate (beats/min)  66  -BB     Posttreatment Heart Rate (beats/min)  63  -BB     Pre SpO2 (%)  96  -BB     O2 Delivery Pre Treatment  supplemental O2  -BB     Post SpO2 (%)  97  -BB     O2 Delivery Post Treatment  supplemental O2  -BB     Pre Patient Position  Sitting  -BB     Post Patient Position  Sitting  -BB     Row Name 08/11/21 1100          Positioning and Restraints    Pre-Treatment Position  in bed  -BB     Post Treatment Position  bed  -BB     In Bed  fowlers;call light within reach;encouraged to call for assist;exit alarm on  -BB       User Key  (r) = Recorded By, (t) = Taken By, (c) = Cosigned By    Initials Name Provider Type    BB  Nissa Bey COTA/L Occupational Therapy Assistant        Outcome Measures     Row Name 08/11/21 1100          How much help from another is currently needed...    Putting on and taking off regular lower body clothing?  2  -BB     Bathing (including washing, rinsing, and drying)  2  -BB     Toileting (which includes using toilet bed pan or urinal)  2  -BB     Putting on and taking off regular upper body clothing  4  -BB     Taking care of personal grooming (such as brushing teeth)  4  -BB     Eating meals  4  -BB     AM-PAC 6 Clicks Score (OT)  18  -BB       User Key  (r) = Recorded By, (t) = Taken By, (c) = Cosigned By    Initials Name Provider Type    BB Nissa Bey COTA/L Occupational Therapy Assistant          Occupational Therapy Education                 Title: PT OT SLP Therapies (Done)     Topic: Occupational Therapy (Done)     Point: ADL training (Done)     Description:   Instruct learner(s) on proper safety adaptation and remediation techniques during self care or transfers.   Instruct in proper use of assistive devices.              Learning Progress Summary           Patient Acceptance, E,TB, VU by ISRAEL at 8/6/2021 1259    Acceptance, E,TB, VU,NR by  at 8/5/2021 0824    Comment: POC, role of OT, transfer training    Acceptance, E, VU by AS at 8/4/2021 1325    Comment: UE strengthening exercises    Acceptance, E,TB, VU by SC at 7/31/2021 1007                   Point: Home exercise program (Done)     Description:   Instruct learner(s) on appropriate technique for monitoring, assisting and/or progressing therapeutic exercises/activities.              Learning Progress Summary           Patient Acceptance, E,TB, VU by ISRAEL at 8/6/2021 1259    Acceptance, E, VU by AS at 8/4/2021 1325    Comment: UE strengthening exercises    Acceptance, E,TB, VU by SC at 7/31/2021 1007                   Point: Precautions (Done)     Description:   Instruct learner(s) on prescribed precautions during self-care  and functional transfers.              Learning Progress Summary           Patient Acceptance, E,TB, VU by LW at 8/6/2021 1259    Acceptance, E,TB, VU,NR by  at 8/5/2021 0824    Comment: POC, role of OT, transfer training    Acceptance, E, VU by AS at 8/4/2021 1325    Comment: UE strengthening exercises    Acceptance, E, VU by ME at 8/3/2021 1508    Comment: Educated on OT and POC. Educated to call for assistance. Educated on safety precautions.    Acceptance, E,TB, VU by SC at 7/31/2021 1007                   Point: Body mechanics (Done)     Description:   Instruct learner(s) on proper positioning and spine alignment during self-care, functional mobility activities and/or exercises.              Learning Progress Summary           Patient Acceptance, E, VU by BB at 8/10/2021 1303    Acceptance, E,TB, VU by LW at 8/6/2021 1259    Acceptance, E,TB, VU,NR by  at 8/5/2021 0824    Comment: POC, role of OT, transfer training    Acceptance, E, VU by ME at 8/3/2021 1508    Comment: Educated on OT and POC. Educated to call for assistance. Educated on safety precautions.    Acceptance, E,TB, VU by SC at 7/31/2021 1007                               User Key     Initials Effective Dates Name Provider Type Discipline     06/16/21 -  Nissa Bey OLMSTEAD/L Occupational Therapy Assistant OT     06/16/21 -  Rekha Perez OLMSTEAD/L Occupational Therapy Assistant OT    AS 03/18/21 -  Arti Navarrete, OT Occupational Therapist OT    ME 06/16/21 -  Nikole Espino OTR/L Occupational Therapist OT     06/14/21 -  Ottoniel Robles OT Occupational Therapist OT    SC 07/21/21 -  Angela West RN Registered Nurse Nurse              OT Recommendation and Plan  Therapy Frequency (OT): other (see comments) (5-7 days per week)  Plan of Care Review  Plan of Care Reviewed With: patient  Progress: no change  Outcome Summary: Pt agrees to OT this am. Pt performs B UE exercises with 1 lb wt 1 set x20 reps. Pt's lunch tray  arrived and tx ended. No new goals met. Continue OT POC     Time Calculation:   Time Calculation- OT     Row Name 08/11/21 1426             Time Calculation- OT    OT Start Time  1100  -BB      OT Stop Time  1124  -BB      OT Time Calculation (min)  24 min  -BB      Total Timed Code Minutes- OT  24 minute(s)  -BB      OT Received On  08/11/21  -BB         Timed Charges    40567 - OT Therapeutic Exercise Minutes  24  -BB         Total Minutes    Timed Charges Total Minutes  24  -BB       Total Minutes  24  -BB        User Key  (r) = Recorded By, (t) = Taken By, (c) = Cosigned By    Initials Name Provider Type    BB Nissa Bey COTA/L Occupational Therapy Assistant        Therapy Charges for Today     Code Description Service Date Service Provider Modifiers Qty    35012846291 HC OT THER PROC EA 15 MIN 8/10/2021 Nissa Bey COTA/L GO 2    00811869240 HC OT SELF CARE/MGMT/TRAIN EA 15 MIN 8/10/2021 Nissa Bey COTA/L GO 2    62752547565 HC OT THER PROC EA 15 MIN 8/11/2021 Nissa Bey COTA/L GO 2               ISHAN Mcgovern/AISHWARYA  8/11/2021

## 2021-08-11 NOTE — PLAN OF CARE
Problem: Adult Inpatient Plan of Care  Goal: Plan of Care Review  Flowsheets  Taken 8/11/2021 1426  Progress: no change  Plan of Care Reviewed With: patient  Taken 8/11/2021 1100  Progress: no change  Plan of Care Reviewed With: patient  Outcome Summary: Pt agrees to OT this am. Pt performs B UE exercises with 1 lb wt 1 set x20 reps. Pt's lunch tray arrived and tx ended. No new goals met. Continue OT POC   Goal Outcome Evaluation:  Plan of Care Reviewed With: patient        Progress: no change  Outcome Summary: Pt agrees to OT this am. Pt performs B UE exercises with 1 lb wt 1 set x20 reps. Pt's lunch tray arrived and tx ended. No new goals met. Continue OT POC

## 2021-08-11 NOTE — PLAN OF CARE
Goal Outcome Evaluation:           Progress: no change  Outcome Summary: PRN pain medication given.  VSS.  Will continue to monitor.

## 2021-08-11 NOTE — THERAPY TREATMENT NOTE
Acute Care - Physical Therapy Treatment Note  [facility]     Patient Name: Yamileth Slater  : 1960  MRN: [id number]  Today's Date: 2021      Visit Dx:     ICD-10-CM ICD-9-CM   1. Cellulitis of left leg  L03.116 682.6   2. Anemia, unspecified type  D64.9 285.9   3. Chronic kidney disease, unspecified CKD stage  N18.9 585.9   4. Type 2 diabetes mellitus with hyperglycemia, unspecified whether long term insulin use (CMS/[facility])  E11.65 250.00   5. Impaired mobility and ADLs  Z74.09 V49.89    Z78.9      Patient Active Problem List   Diagnosis   • Type 2 diabetes mellitus with diabetic polyneuropathy, with long-term current use of insulin (CMS/[facility])   • Chronic obstructive pulmonary disease (CMS/[facility])   • Chronic midline low back pain without sciatica   • Vitamin D deficiency   • Type 2 diabetes mellitus (CMS/[facility])   • Tobacco dependence syndrome   • Surgical follow-up care   • Shoulder joint pain   • Peripheral vascular disease (CMS/[facility])   • Pain   • Neurologic disorder associated with diabetes mellitus (CMS/[facility])   • Morbid obesity with BMI of 50.0-59.9, adult (CMS/[facility])   • Mixed hyperlipidemia   • Kidney stone   • History of colon polyps   • GERD (gastroesophageal reflux disease)   • Excoriated eczema   • Essential hypertension   • Encounter for medication refill   • Dyslipidemia   • Diabetes mellitus (CMS/[facility])   • Coronary artery disease   • Chronic obstructive lung disease (CMS/[facility])   • Chronic folliculitis   • Chest pain   • Mild persistent asthma   • 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   • Uncontrolled type 2 diabetes mellitus with complication, with long-term current use of insulin (CMS/[facility])   • Anemia   • Hypothyroidism   • Carotid artery disease (CMS/[facility])   • Current smoker   • DM type 2 with diabetic peripheral neuropathy (CMS/[facility])   • [place] abuse   • PAD (peripheral artery disease) (CMS/[facility])   • S/P CABG x 3   • Intercostal pain   • Venous insufficiency   • Dyspnea on  exertion   • LAFB (left anterior fascicular block)   • Pulmonary hypertension (CMS/[facility])   • Left hip pain   • Neck pain   • Acute kidney injury superimposed on chronic kidney disease (CMS/HCC)   • MIKE and COPD overlap syndrome (CMS/[facility])   • Pneumonia due to COVID-19 virus   • CKD (chronic kidney disease) stage 4, GFR 15-29 ml/min (CMS/[facility])   • Morbid obesity (CMS/[facility])   • Type 2 diabetes mellitus with hyperglycemia, with long-term current use of insulin (CMS/[facility])   • Hyponatremia   • Hyperkalemia   • Anemia   • Cutaneous candidiasis   • Community acquired pneumonia of right middle lobe of lung   • MRSA infection   • Alkaline phosphatase elevation   • COVID-19 ruled out by laboratory testing   • Esophageal dysphagia   • Monilial esophagitis (CMS/[facility])   • NSTEMI, initial episode of care (CMS/[facility])   • CAD (coronary artery disease)   • Gastrointestinal hemorrhage   • Chronic respiratory failure with hypoxia (CMS/[facility])   • Pneumonia due to infectious organism   • Chronic hypercapnic respiratory failure (CMS/[facility])   • Cellulitis of left leg     Past Medical History:   Diagnosis Date   • Anxiety    • Carotid artery stenosis    • Chronic obstructive lung disease (CMS/[facility])    • CKD (chronic kidney disease) stage 4, GFR 15-29 ml/min (CMS/[facility])    • Colonic polyp    • Coronary arteriosclerosis    • Diabetes mellitus (CMS/[facility])    • Diabetic neuropathy (CMS/[facility])    • GERD (gastroesophageal reflux disease)    • History of transfusion    • Hypercholesterolemia    • Hypertension    • Hypomagnesemia 6/27/2021    Monitor and replace   • Morbid obesity (CMS/[facility])    • Nephrolithiasis    • Peripheral vascular disease (CMS/[facility])    • Sleep apnea    • Substance abuse (CMS/[facility])    • Vitamin D deficiency      Past Surgical History:   Procedure Laterality Date   • CARDIAC CATHETERIZATION N/A 7/14/2020   • CARDIAC CATHETERIZATION N/A 4/23/2021    Procedure: Left Heart Cath;  Surgeon: Melba Romo MD;  Location: [facility] CATH INVASIVE  LOCATION;  Service: Cardiology;  Laterality: N/A;   • CARDIAC CATHETERIZATION N/A 4/30/2021    Procedure: Percutaneous Coronary Intervention;  Surgeon: Russell Voss MD;  Location: [facility] CATH INVASIVE LOCATION;  Service: Cardiovascular;  Laterality: N/A;   • CARDIAC CATHETERIZATION N/A 4/30/2021    Procedure: Stent NIKKI coronary;  Surgeon: Russell Voss MD;  [facility];  Service: Cardiovascular;  Laterality: N/A;   • CAROTID STENT Left    • COLONOSCOPY     • COLONOSCOPY N/A 5/14/2021    Procedure: COLONOSCOPY;  Surgeon: Mingo Duarte MD;  Location: [facility] ENDOSCOPY;  Service: Gastroenterology;  Laterality: N/A;   • CORONARY ARTERY BYPASS GRAFT N/A 2013    CABG X 3   • CYSTOSCOPY BLADDER STONE LITHOTRIPSY Bilateral    • ENDOSCOPY N/A 4/12/2021    Procedure: ESOPHAGOGASTRODUODENOSCOPY;  Surgeon: Mingo Duarte MD;  Location: [facility] ENDOSCOPY;  Service: Gastroenterology;  Laterality: N/A;   • ENDOSCOPY N/A 5/14/2021    Procedure: ESOPHAGOGASTRODUODENOSCOPY;  Surgeon: Mingo Duarte MD;  Location: [facility] ENDOSCOPY;  Service: Gastroenterology;  Laterality: N/A;        PT Assessment (last 12 hours)      PT Evaluation and Treatment     Row Name 08/11/21 1429          Physical Therapy Time and Intention    Subjective Information  complains of;pain  -TA     Document Type  therapy note (daily note)  -TA     Mode of Treatment  individual therapy;physical therapy  -TA     Patient Effort  good  -TA     Comment  pt defered standing/gait  -TA     Row Name 08/11/21 1429          General Information    Patient Profile Reviewed  yes  -TA     Existing Precautions/Restrictions  fall;oxygen therapy device and L/min  -TA     Row Name 08/11/21 1429          Cognition    Affect/Mental Status (Cognitive)  WFL  -TA     Orientation Status (Cognition)  oriented x 4  -TA     Follows Commands (Cognition)  WFL  -TA     Row Name 08/11/21 1429          Pain Scale: Numbers  Pre/Post-Treatment    Pretreatment Pain Rating  5/10  -TA     Posttreatment Pain Rating  5/10  -TA     Pain Location - Side  Left  -TA     Pain Location - Orientation  lower  -TA     Pain Location  extremity  -TA     Row Name 08/11/21 1429          Bed Mobility    Supine-Sit [place] (Bed Mobility)  independent  -TA     Sit-Supine [place] (Bed Mobility)  independent  -TA     Comment (Bed Mobility)  pt sat @ EOB ~15 minutes self supported  -TA     Row Name 08/11/21 1429          Transfers    Sit-Stand [place] (Transfers)  not tested  -TA     Stand-Sit [place] (Transfers)  not tested  -TA     Row Name 08/11/21 1429          Hip (Therapeutic Exercise)    Hip (Therapeutic Exercise)  AROM (active range of motion)  -TA     Hip AROM (Therapeutic Exercise)  bilateral;flexion;aBduction;aDduction;sitting;10 repetitions;5 repetitions  -TA     Hip Isometrics (Therapeutic Exercise)  bilateral;gluteal sets;supine;10 repetitions;5 repetitions  -TA     Row Name 08/11/21 1429          Knee (Therapeutic Exercise)    Knee (Therapeutic Exercise)  AROM (active range of motion);isometric exercises  -TA     Knee AROM (Therapeutic Exercise)  bilateral;LAQ (long arc quad);sitting;10 repetitions;5 repetitions  -TA     Knee Isometrics (Therapeutic Exercise)  bilateral;gluteal sets;supine;10 second hold;5 second hold  -TA     Row Name 08/11/21 1429          Ankle (Therapeutic Exercise)    Ankle (Therapeutic Exercise)  AROM (active range of motion)  -TA     Ankle AROM (Therapeutic Exercise)  bilateral;dorsiflexion;plantarflexion;supine;10 repetitions;5 repetitions  -TA     Row Name 08/11/21 1429          Plan of Care Review    Plan of Care Reviewed With  patient  -TA     Outcome Summary  pt defered standing/gait, pt sup<>sit with independence, pt sat @ EOB ~15 minutes, pt performed B LE exercises. pt will require 24/7 care & continued PT services  -TA     Row Name 08/11/21 1429          Vital Signs    Pre Systolic BP Rehab   129  -TA     Pre Treatment Diastolic BP  68  -TA     Post Systolic BP Rehab  137  -TA     Post Treatment Diastolic BP  60  -TA     Pretreatment Heart Rate (beats/min)  60  -TA     Posttreatment Heart Rate (beats/min)  61  -TA     Pre SpO2 (%)  98  -TA     O2 Delivery Pre Treatment  supplemental O2  -TA     Post SpO2 (%)  97  -TA     O2 Delivery Post Treatment  supplemental O2  -TA     Pre Patient Position  Supine  -TA     Intra Patient Position  Sitting  -TA     Post Patient Position  Supine  -TA     Row Name 08/11/21 1429          Bed Mobility Goal 1 (PT)    Activity/Assistive Device (Bed Mobility Goal 1, PT)  sit to supine;supine to sit  -TA     [place] Level/Cues Needed (Bed Mobility Goal 1, PT)  independent  -TA     Time Frame (Bed Mobility Goal 1, PT)  by discharge  -TA     Progress/Outcomes (Bed Mobility Goal 1, PT)  goal met  -TA     Row Name 08/11/21 1429          Transfer Goal 1 (PT)    Activity/Assistive Device (Transfer Goal 1, PT)  sit-to-stand/stand-to-sit;bed-to-chair/chair-to-bed  -TA     [place] Level/Cues Needed (Transfer Goal 1, PT)  modified independence  -TA     Time Frame (Transfer Goal 1, PT)  by discharge  -TA     Progress/Outcome (Transfer Goal 1, PT)  goal not met  -TA     Row Name 08/11/21 1429          Gait Training Goal 1 (PT)    Activity/Assistive Device (Gait Training Goal 1, PT)  gait (walking locomotion);assistive device use;backward stepping  -TA     [place] Level (Gait Training Goal 1, PT)  modified independence  -TA     Distance (Gait Training Goal 1, PT)  25'x2  -TA     Time Frame (Gait Training Goal 1, PT)  by discharge  -TA     Progress/Outcome (Gait Training Goal 1, PT)  goal not met  -TA     Row Name 08/11/21 1429          Positioning and Restraints    Pre-Treatment Position  in bed  -TA     Post Treatment Position  bed  -TA     In Bed  supine;call light within reach;exit alarm on  -TA     Row Name 08/11/21 1429          Therapy Assessment/Plan (PT)    Comment,  Therapy Assessment/Plan (PT)  continue  -TA       User Key  (r) = Recorded By, (t) = Taken By, (c) = Cosigned By    Initials Name Provider Type    Sue Matute PTA Physical Therapy Assistant        Physical Therapy Education                 Title: PT OT SLP Therapies (Done)     Topic: Physical Therapy (Done)     Point: Mobility training (Done)     Learning Progress Summary           Patient Acceptance, E,TB, VU by LW at 8/6/2021 1259    Acceptance, E,TB, VU by LR at 8/4/2021 0821    Comment: Educated on PT POC and goals.                   Point: Home exercise program (Done)     Learning Progress Summary           Patient Acceptance, E,TB, VU by LW at 8/6/2021 1259    Acceptance, E,TB, VU by LR at 8/4/2021 0821    Comment: Educated on PT POC and goals.                   Point: Body mechanics (Done)     Learning Progress Summary           Patient Acceptance, E,TB, VU by LW at 8/6/2021 1259    Acceptance, E,TB, VU by LR at 8/4/2021 0821    Comment: Educated on PT POC and goals.                   Point: Precautions (Done)     Learning Progress Summary           Patient Acceptance, E,TB, VU by LW at 8/6/2021 1259    Acceptance, E,TB, VU by LR at 8/4/2021 0821    Comment: Educated on PT POC and goals.    Acceptance, E,TB, VU by SC at 7/31/2021 1007                               User Key     Initials Effective Dates Name Provider Type Discipline    LW 06/16/21 -  Rekha Perez COTA/L Occupational Therapy Assistant OT    LR 06/16/21 -  Lucien Gilman Physical Therapist PT    SC 07/21/21 -  Angela West RN Registered Nurse Nurse              PT Recommendation and Plan  Anticipated Discharge Disposition (PT): home with 24/7 care, home with home health, skilled nursing facility  Therapy Frequency (PT): other (see comments) (5-7d/week)  Plan of Care Reviewed With: patient  Progress: improving  Outcome Summary: pt defered standing/gait, pt sup<>sit with independence, pt sat @ EOB ~15 minutes, pt performed B LE  exercises. pt will require 24/7 care & continued PT services  Outcome Measures     Row Name 08/11/21 1500 08/10/21 1600 08/09/21 1300       How much help from another person do you currently need...    Turning from your back to your side while in flat bed without using bedrails?  --  4  -TA  4  -TA    Moving from lying on back to sitting on the side of a flat bed without bedrails?  --  4  -TA  4  -TA    Moving to and from a bed to a chair (including a wheelchair)?  --  3  -TA  3  -TA    Standing up from a chair using your arms (e.g., wheelchair, bedside chair)?  --  3  -TA  3  -TA    Climbing 3-5 steps with a railing?  --  3  -TA  3  -TA    To walk in hospital room?  --  3  -TA  3  -TA    AM-PAC 6 Clicks Score (PT)  --  20  -TA  20  -TA       Functional Assessment    Outcome Measure Options  AM-PAC 6 Clicks Basic Mobility (PT)  -TA  AM-PAC 6 Clicks Basic Mobility (PT)  -TA  AM-PAC 6 Clicks Basic Mobility (PT)  -TA      User Key  (r) = Recorded By, (t) = Taken By, (c) = Cosigned By    Initials Name Provider Type    Sue Matute PTA Physical Therapy Assistant           Time Calculation:   PT Charges     Row Name 08/11/21 1536             Time Calculation    Start Time  1429  -TA      Stop Time  1522  -TA      Time Calculation (min)  53 min  -TA      PT Received On  08/11/21  -TA         Time Calculation- PT    Total Timed Code Minutes- PT  53 minute(s)  -TA         Timed Charges    49111 - PT Therapeutic Exercise Minutes  30  -TA      92048 - PT Therapeutic Activity Minutes  23  -TA         Total Minutes    Timed Charges Total Minutes  53  -TA       Total Minutes  53  -TA        User Key  (r) = Recorded By, (t) = Taken By, (c) = Cosigned By    Initials Name Provider Type    Sue Matute PTA Physical Therapy Assistant        Therapy Charges for Today     Code Description Service Date Service Provider Modifiers Qty    88066759595 HC PT THER PROC EA 15 MIN 8/10/2021 Sue Bird PTA GP 1    [id number]  HC PT THERAPEUTIC ACT EA 15 MIN 8/10/2021 Sue Bird, PTA GP 1    40675892446 HC PT THER PROC EA 15 MIN 8/11/2021 Sue Bird, PTA GP 2    76455110640 HC PT THERAPEUTIC ACT EA 15 MIN 8/11/2021 Sue Bird, PTA GP 2          PT G-Codes  Outcome Measure Options: AM-PAC 6 Clicks Basic Mobility (PT)  AM-PAC 6 Clicks Score (PT): 20  AM-PAC 6 Clicks Score (OT): 18    Sue Bird PTA  8/11/2021

## 2021-08-11 NOTE — PROGRESS NOTES
"[facility]  [address] [place] [address]. [address]  T - [phone]  F - [phone]     Progress Note          PATIENT  DEMOGRAPHICS   PATIENT NAME: Yamileth Slater                      PHYSICIAN: BRIDGETT Hadley  : 1960  MRN: [id number]   LOS: 8 days    Patient Care Team:  Rianna Macias MD as PCP - General (Family Medicine)  Subjective   SUBJECTIVE   No acute events overnight.         Objective   OBJECTIVE   Vital Signs  Temp:  [96.2 °F (35.7 °C)-98 °F (36.7 °C)] 97.6 °F (36.4 °C)  Heart Rate:  [55-76] 73  Resp:  [18] 18  BP: (132-178)/(62-86) 132/68    Flowsheet Rows      First Filed Value   Admission Height  167.6 cm (66\") Documented at 2021   Admission Weight  127 kg (280 lb) Documented at 2021           I/O last 3 completed shifts:  In: -   Out: 1700 [Urine:1700]    PHYSICAL EXAM    Physical Exam  Constitutional:       General: She is not in acute distress.     Appearance: She is obese. She is ill-appearing.   HENT:      Head: Normocephalic and atraumatic.   Cardiovascular:      Rate and Rhythm: Normal rate and regular rhythm.   Pulmonary:      Effort: Pulmonary effort is normal.      Breath sounds: Normal breath sounds.   Abdominal:      General: Bowel sounds are normal. There is distension.   Musculoskeletal:      Right lower leg: Edema present.      Left lower leg: Edema present.   Skin:     General: Skin is warm and dry.      Coloration: Skin is pale.   Neurological:      General: No focal deficit present.      Mental Status: She is alert and oriented to person, place, and time.         RESULTS   Results Review:    Results from last 7 days   Lab Units 21  0534 08/10/21  0525 21  0533   SODIUM mmol/L 134* 137 132*   POTASSIUM mmol/L 3.9 4.6 4.1   CHLORIDE mmol/L 97* 99 98   CO2 mmol/L 26.0 29.0 27.0   BUN mg/dL 47* 45* 42*   CREATININE mg/dL 2.49* 2.37* 2.02*   CALCIUM mg/dL 8.1* 8.4* 8.6   GLUCOSE mg/dL 235* 171* 260* "       Estimated Creatinine Clearance: 32.8 mL/min (A) (by C-G formula based on SCr of 2.49 mg/dL (H)).                Results from last 7 days   Lab Units 08/11/21  0534 08/10/21  0525 08/09/21  0533 08/08/21  0453 08/07/21  0649   WBC 10*3/mm3 8.47 9.10 8.40 8.64 8.51   HEMOGLOBIN g/dL 7.8* 7.8* 7.7* 7.7* 7.3*   PLATELETS 10*3/mm3 281 295 295 300 291               Imaging Results (Last 24 Hours)     ** No results found for the last 24 hours. **           MEDICATIONS    acidophilus, 1 tablet, Oral, Daily  aspirin, 81 mg, Oral, Daily  atorvastatin, 40 mg, Oral, Daily  bumetanide, 2 mg, Intravenous, BID  cholecalciferol, 50,000 Units, Oral, Weekly  clopidogrel, 75 mg, Oral, Daily  docosanol, , Topical, 5x Daily  DULoxetine, 20 mg, Oral, Daily  gabapentin, 800 mg, Oral, TID  heparin (porcine), 5,000 Units, Subcutaneous, Q12H  insulin aspart, 0-24 Units, Subcutaneous, TID AC  insulin detemir, 55 Units, Subcutaneous, Q12H  isosorbide mononitrate, 30 mg, Oral, Daily  metoprolol tartrate, 100 mg, Oral, Q12H  oxybutynin XL, 5 mg, Oral, Daily  sodium chloride, 10 mL, Intravenous, Q12H           Assessment/Plan   ASSESSMENT / PLAN      Cellulitis of left leg    1.  ERIKA on CKD 4- Baseline creatinine around 2.0, up to 2.7 peak. Now 2.1-2.0mg/dL and up to 2.5 today.    -Keep IV bumex 2mg IV and lower to QD and switch to po , albumin low and will add albumin before bumex.    If cr is better or stable ok to discharge in 24 hrs     2.  Hyponatremia- sodium stable- fluid restriction 1000cc/day.      3.  Left lower extremity cellulitis- on vancomycin, flagyl and cefepime- per primary team, failed previous antibiotic course. Has u/s of leg didn't show abscess. Blood culture is negative at 5 days.      4.  History of CAD     5.  History of COPD     6.  Anemia- hemoglobin low. Anemia is consistent with fe def / anemia of chronic disease and on IV Fe. Also has low b12 in OSH and received b12 IM, s/p epogen x1     7. Vitamin D  deficiency- on vitamin d 50K weekly     8. Metabolic Acidosis- off sodium bicarb         This document has been electronically signed by BRIDGETT Hadley on August 11, 2021 13:31 CDT      For this patient encounter, I have reviewed the Nurse Practitioner's documentation, medical decision making, and treatment plan and personally spent time with the patient.

## 2021-08-11 NOTE — PLAN OF CARE
Goal Outcome Evaluation:  Plan of Care Reviewed With: patient        Progress: improving  Outcome Summary: pt defered standing/gait, pt sup<>sit with independence, pt sat @ EOB ~15 minutes, pt performed B LE exercises. pt will require 24/7 care & continued PT services

## 2021-08-12 ENCOUNTER — READMISSION MANAGEMENT (OUTPATIENT)
Dept: CALL CENTER | Facility: HOSPITAL | Age: 61
End: 2021-08-12

## 2021-08-12 VITALS
TEMPERATURE: 98.1 F | WEIGHT: 293 LBS | HEIGHT: 66 IN | DIASTOLIC BLOOD PRESSURE: 67 MMHG | HEART RATE: 63 BPM | BODY MASS INDEX: 47.09 KG/M2 | OXYGEN SATURATION: 98 % | SYSTOLIC BLOOD PRESSURE: 150 MMHG | RESPIRATION RATE: 18 BRPM

## 2021-08-12 LAB
ANION GAP SERPL CALCULATED.3IONS-SCNC: 8 MMOL/L (ref 5–15)
BASOPHILS # BLD AUTO: 0.08 10*3/MM3 (ref 0–0.2)
BASOPHILS NFR BLD AUTO: 0.9 % (ref 0–1.5)
BUN SERPL-MCNC: 46 MG/DL (ref 8–23)
BUN/CREAT SERPL: 21.5 (ref 7–25)
CALCIUM SPEC-SCNC: 8.5 MG/DL (ref 8.6–10.5)
CHLORIDE SERPL-SCNC: 95 MMOL/L (ref 98–107)
CO2 SERPL-SCNC: 27 MMOL/L (ref 22–29)
CREAT SERPL-MCNC: 2.14 MG/DL (ref 0.57–1)
DEPRECATED RDW RBC AUTO: 50 FL (ref 37–54)
EOSINOPHIL # BLD AUTO: 0.32 10*3/MM3 (ref 0–0.4)
EOSINOPHIL NFR BLD AUTO: 3.7 % (ref 0.3–6.2)
ERYTHROCYTE [DISTWIDTH] IN BLOOD BY AUTOMATED COUNT: 15.8 % (ref 12.3–15.4)
GFR SERPL CREATININE-BSD FRML MDRD: 23 ML/MIN/1.73
GLUCOSE BLDC GLUCOMTR-MCNC: 224 MG/DL (ref 70–130)
GLUCOSE SERPL-MCNC: 226 MG/DL (ref 65–99)
HCT VFR BLD AUTO: 26.2 % (ref 34–46.6)
HGB BLD-MCNC: 8 G/DL (ref 12–15.9)
IMM GRANULOCYTES # BLD AUTO: 0.09 10*3/MM3 (ref 0–0.05)
IMM GRANULOCYTES NFR BLD AUTO: 1 % (ref 0–0.5)
LYMPHOCYTES # BLD AUTO: 2.5 10*3/MM3 (ref 0.7–3.1)
LYMPHOCYTES NFR BLD AUTO: 29.1 % (ref 19.6–45.3)
MCH RBC QN AUTO: 26.8 PG (ref 26.6–33)
MCHC RBC AUTO-ENTMCNC: 30.5 G/DL (ref 31.5–35.7)
MCV RBC AUTO: 87.9 FL (ref 79–97)
MONOCYTES # BLD AUTO: 0.59 10*3/MM3 (ref 0.1–0.9)
MONOCYTES NFR BLD AUTO: 6.9 % (ref 5–12)
NEUTROPHILS NFR BLD AUTO: 5.02 10*3/MM3 (ref 1.7–7)
NEUTROPHILS NFR BLD AUTO: 58.4 % (ref 42.7–76)
NRBC BLD AUTO-RTO: 0.2 /100 WBC (ref 0–0.2)
PLATELET # BLD AUTO: 290 10*3/MM3 (ref 140–450)
PMV BLD AUTO: 8.7 FL (ref 6–12)
POTASSIUM SERPL-SCNC: 4.2 MMOL/L (ref 3.5–5.2)
RBC # BLD AUTO: 2.98 10*6/MM3 (ref 3.77–5.28)
SODIUM SERPL-SCNC: 130 MMOL/L (ref 136–145)
WBC # BLD AUTO: 8.6 10*3/MM3 (ref 3.4–10.8)

## 2021-08-12 PROCEDURE — 82962 GLUCOSE BLOOD TEST: CPT

## 2021-08-12 PROCEDURE — 97535 SELF CARE MNGMENT TRAINING: CPT

## 2021-08-12 PROCEDURE — 85025 COMPLETE CBC W/AUTO DIFF WBC: CPT | Performed by: FAMILY MEDICINE

## 2021-08-12 PROCEDURE — 25010000002 ALBUMIN HUMAN 25% PER 50 ML: Performed by: NURSE PRACTITIONER

## 2021-08-12 PROCEDURE — 97110 THERAPEUTIC EXERCISES: CPT

## 2021-08-12 PROCEDURE — 25010000002 HYDROMORPHONE 1 MG/ML SOLUTION: Performed by: INTERNAL MEDICINE

## 2021-08-12 PROCEDURE — 63710000001 INSULIN ASPART PER 5 UNITS: Performed by: INTERNAL MEDICINE

## 2021-08-12 PROCEDURE — 25010000002 HEPARIN (PORCINE) PER 1000 UNITS: Performed by: INTERNAL MEDICINE

## 2021-08-12 PROCEDURE — 63710000001 INSULIN DETEMIR PER 5 UNITS: Performed by: FAMILY MEDICINE

## 2021-08-12 PROCEDURE — 80048 BASIC METABOLIC PNL TOTAL CA: CPT | Performed by: FAMILY MEDICINE

## 2021-08-12 PROCEDURE — P9047 ALBUMIN (HUMAN), 25%, 50ML: HCPCS | Performed by: NURSE PRACTITIONER

## 2021-08-12 RX ORDER — BUMETANIDE 1 MG/1
1 TABLET ORAL 2 TIMES DAILY
Qty: 60 TABLET | Refills: 3 | Status: SHIPPED | OUTPATIENT
Start: 2021-08-12 | End: 2021-10-28 | Stop reason: HOSPADM

## 2021-08-12 RX ADMIN — ALBUMIN HUMAN 25 G: 0.25 SOLUTION INTRAVENOUS at 08:27

## 2021-08-12 RX ADMIN — INSULIN ASPART 13 UNITS: 100 INJECTION, SOLUTION INTRAVENOUS; SUBCUTANEOUS at 08:23

## 2021-08-12 RX ADMIN — OXYBUTYNIN CHLORIDE 5 MG: 5 TABLET, EXTENDED RELEASE ORAL at 08:21

## 2021-08-12 RX ADMIN — ATORVASTATIN CALCIUM 40 MG: 40 TABLET, FILM COATED ORAL at 08:21

## 2021-08-12 RX ADMIN — Medication 1 TABLET: at 08:21

## 2021-08-12 RX ADMIN — DOCOSANOL: 100 CREAM TOPICAL at 11:16

## 2021-08-12 RX ADMIN — DULOXETINE HYDROCHLORIDE 20 MG: 20 CAPSULE, DELAYED RELEASE ORAL at 08:21

## 2021-08-12 RX ADMIN — GABAPENTIN 800 MG: 400 CAPSULE ORAL at 08:21

## 2021-08-12 RX ADMIN — METOPROLOL TARTRATE 100 MG: 50 TABLET, FILM COATED ORAL at 08:21

## 2021-08-12 RX ADMIN — INSULIN DETEMIR 60 UNITS: 100 INJECTION, SOLUTION SUBCUTANEOUS at 08:26

## 2021-08-12 RX ADMIN — ASPIRIN 81 MG: 81 TABLET, FILM COATED ORAL at 08:21

## 2021-08-12 RX ADMIN — HEPARIN SODIUM 5000 UNITS: 5000 INJECTION INTRAVENOUS; SUBCUTANEOUS at 08:23

## 2021-08-12 RX ADMIN — CLOPIDOGREL BISULFATE 75 MG: 75 TABLET ORAL at 08:21

## 2021-08-12 RX ADMIN — DOCOSANOL: 100 CREAM TOPICAL at 06:43

## 2021-08-12 RX ADMIN — INSULIN ASPART 13 UNITS: 100 INJECTION, SOLUTION INTRAVENOUS; SUBCUTANEOUS at 11:15

## 2021-08-12 RX ADMIN — HYDROMORPHONE HYDROCHLORIDE 0.25 MG: 1 INJECTION, SOLUTION INTRAMUSCULAR; INTRAVENOUS; SUBCUTANEOUS at 11:20

## 2021-08-12 RX ADMIN — SODIUM CHLORIDE, PRESERVATIVE FREE 10 ML: 5 INJECTION INTRAVENOUS at 08:22

## 2021-08-12 RX ADMIN — OFLOXACIN 50000 UNITS: 300 TABLET, COATED ORAL at 08:23

## 2021-08-12 RX ADMIN — BUMETANIDE 1 MG: 1 TABLET ORAL at 06:43

## 2021-08-12 RX ADMIN — HYDROMORPHONE HYDROCHLORIDE 0.25 MG: 1 INJECTION, SOLUTION INTRAMUSCULAR; INTRAVENOUS; SUBCUTANEOUS at 04:57

## 2021-08-12 RX ADMIN — ISOSORBIDE MONONITRATE 30 MG: 30 TABLET, EXTENDED RELEASE ORAL at 08:21

## 2021-08-12 NOTE — OUTREACH NOTE
Prep Survey      Responses   Congregation facility patient discharged from?  [facility]   Is LACE score < 7 ?  No   Emergency Room discharge w/ pulse ox?  No   Eligibility  [facility]   Date of Admission  07/30/21   Date of Discharge  08/12/21   Discharge Disposition  Home-Health Care [facility]   Discharge diagnosis  Cellulitis of left leg   Does the patient have one of the following disease processes/diagnoses(primary or secondary)?  Other   Does the patient have Home health ordered?  Yes   What is the Home health agency?   [facility]   Is there a DME ordered?  No   Prep survey completed?  Yes          Dayna Dueñas RN

## 2021-08-12 NOTE — DISCHARGE SUMMARY
[facility] Medicine Services  DISCHARGE SUMMARY       Date of Admission: 7/30/2021  Date of Discharge:  8/12/2021  Primary Care Physician: Rianna Macias MD    Presenting Problem/History of Present Illness:  Cellulitis of left leg [L03.116]  Anemia, unspecified type [D64.9]  Chronic kidney disease, unspecified CKD stage [N18.9]  Type 2 diabetes mellitus with hyperglycemia, unspecified whether long term insulin use (CMS/Formerly [facility]) [E11.65]     Final Discharge Diagnoses:  Active Hospital Problems    Diagnosis    • Cellulitis of left leg        Consults:   Consults     Date and Time Order Name Status Description    8/5/2021 10:11 AM Inpatient General Surgery Consult Completed     6/30/2021  8:45 AM Inpatient Nephrology Consult Completed                     Pertinent Test Results:   Lab Results (last 24 hours)     Procedure Component Value Units Date/Time    Basic Metabolic Panel [[id number]]  (Abnormal) Collected: 08/12/21 0810    Specimen: Blood Updated: 08/12/21 0834     Glucose 226 mg/dL      BUN 46 mg/dL      Creatinine 2.14 mg/dL      Sodium 130 mmol/L      Potassium 4.2 mmol/L      Chloride 95 mmol/L      CO2 27.0 mmol/L      Calcium 8.5 mg/dL      eGFR Non African Amer 23 mL/min/1.73      BUN/Creatinine Ratio 21.5     Anion Gap 8.0 mmol/L     Narrative:      GFR Normal >60  Chronic Kidney Disease <60  Kidney Failure <15      CBC & Differential [[id number]]  (Abnormal) Collected: 08/12/21 0810    Specimen: Blood Updated: 08/12/21 0817    Narrative:      The following orders were created for panel order CBC & Differential.  Procedure                               Abnormality         Status                     ---------                               -----------         ------                     CBC Auto Differential[[id number]]        Abnormal            Final result                 Please view results for these tests on the individual orders.    CBC Auto Differential [[id number]]   (Abnormal) Collected: 08/12/21 0810    Specimen: Blood Updated: 08/12/21 0817     WBC 8.60 10*3/mm3      RBC 2.98 10*6/mm3      Hemoglobin 8.0 g/dL      Hematocrit 26.2 %      MCV 87.9 fL      MCH 26.8 pg      MCHC 30.5 g/dL      RDW 15.8 %      RDW-SD 50.0 fl      MPV 8.7 fL      Platelets 290 10*3/mm3      Neutrophil % 58.4 %      Lymphocyte % 29.1 %      Monocyte % 6.9 %      Eosinophil % 3.7 %      Basophil % 0.9 %      Immature Grans % 1.0 %      Neutrophils, Absolute 5.02 10*3/mm3      Lymphocytes, Absolute 2.50 10*3/mm3      Monocytes, Absolute 0.59 10*3/mm3      Eosinophils, Absolute 0.32 10*3/mm3      Basophils, Absolute 0.08 10*3/mm3      Immature Grans, Absolute 0.09 10*3/mm3      nRBC 0.2 /100 WBC     POC Glucose Once [[id number]]  (Abnormal) Collected: 08/12/21 0631    Specimen: Blood Updated: 08/12/21 0648     Glucose 224 mg/dL      Comment: RN NotifiedOperator: [id number] WASHINGTON MARCELMeter ID: [id number]       POC Glucose Once [[id number]]  (Abnormal) Collected: 08/11/21 2002    Specimen: Blood Updated: 08/11/21 2014     Glucose 165 mg/dL      Comment: RN NotifiedOperator: [id number] DARIEN AMBERMeter ID: [id number]           Imaging Results (Last 24 Hours)     ** No results found for the last 24 hours. **        Chief Complaint on Day of Discharge: No complaints    Hospital Course:  This is a 61-year-old female with past medical history of hypertension, hyperlipidemia, IDDM, CKD, CAD and COPD (oxygen dependent) that presented to [facility] on 7/30/2021 with complaints of worsening swelling and erythema of the left lower extremity.  Patient received a complete course of vancomycin, Flagyl and cefepime.  Nephrology was consulted secondary to ERIKA on CKD 4.  Patient required IV albumin during admission.  She will continue Bumex 1 mg twice daily and follow-up with Dr. Lauren in 2 to 3 weeks.  Follow with PCP in 1 week.  Home health was set up for physical and Occupational  "Therapy.    Condition on Discharge: Stable    Physical Exam on Discharge:  /67 (BP Location: Right arm, Patient Position: Lying)   Pulse 63   Temp 98.1 °F (36.7 °C) (Temporal)   Resp 18   Ht 167.6 cm (66\")   Wt 134 kg (294 lb 8 oz)   LMP  (LMP Unknown)   SpO2 98%   BMI 47.53 kg/m²   Physical Exam  Constitutional:       Appearance: She is well-developed.   HENT:      Head: Normocephalic and atraumatic.   Eyes:      Pupils: Pupils are equal, round, and reactive to light.   Cardiovascular:      Rate and Rhythm: Normal rate and regular rhythm.   Pulmonary:      Effort: Pulmonary effort is normal.      Breath sounds: Normal breath sounds.   Abdominal:      General: Bowel sounds are normal.      Palpations: Abdomen is soft.   Musculoskeletal:         General: Normal range of motion.      Cervical back: Normal range of motion and neck supple.   Skin:     General: Skin is warm and dry.   Neurological:      Mental Status: She is alert and oriented to person, place, and time.   Psychiatric:         Behavior: Behavior normal.       Discharge Disposition:  Home-Health Care [facility] [place]    Discharge Medications:     Discharge Medications      New Medications      Instructions Start Date   bumetanide 1 MG tablet  Commonly known as: BUMEX  Notes to patient: 08/12/2021   1 mg, Oral, 2 Times Daily         Changes to Medications      Instructions Start Date   atorvastatin 40 MG tablet  Commonly known as: LIPITOR  What changed: when to take this  Notes to patient: 08/13/2021   40 mg, Oral, Daily      metoprolol tartrate 100 MG tablet  Commonly known as: LOPRESSOR  What changed: when to take this  Notes to patient: 08/12/2021   100 mg, Oral, Every 12 Hours Scheduled         Continue These Medications      Instructions Start Date   Adult Aspirin Regimen 81 MG EC tablet  Generic drug: aspirin  Notes to patient: 08/13/2021   81 mg, Oral, Daily      albuterol sulfate  (90 Base) MCG/ACT inhaler  Commonly known as: PROVENTIL " HFA;VENTOLIN HFA;PROAIR HFA  Notes to patient: As needed   2 puffs, Inhalation, Every 4 Hours PRN      cetirizine 10 MG tablet  Commonly known as: zyrTEC  Notes to patient: 08/13/2021   10 mg, Oral, Daily      clopidogrel 75 MG tablet  Commonly known as: PLAVIX  Notes to patient: 08/13/2021   75 mg, Oral, Daily      [employer] Blood Glucose Meter w/Device kit  Notes to patient: 08/12/2021   1 each, Does not apply, 3 Times Daily      cyclobenzaprine 10 MG tablet  Commonly known as: FLEXERIL  Notes to patient: As needed   10 mg, Oral, 2 Times Daily PRN      DULoxetine 20 MG capsule  Commonly known as: CYMBALTA  Notes to patient: 08/13/2021   20 mg, Oral, Daily      Easy Touch Pen Needles 31G X 8 MM misc  Generic drug: Insulin Pen Needle   No dose, route, or frequency recorded.      NovoFine 30G X 8 MM misc  Generic drug: Insulin Pen Needle   As directed 4 times daily      ferrous sulfate 325 (65 FE) MG tablet  Commonly known as: FeroSul  Notes to patient: 08/13/2021   325 mg, Oral, Daily With Breakfast      FreeStyle Jade 2 [place] device   1 each, Does not apply, Continuous      FreeStyle Jade 2 Sensor misc   1 each, Does not apply, Every 14 Days      gabapentin 800 MG tablet  Commonly known as: NEURONTIN  Notes to patient: 08/12/2021   800 mg, Oral, 3 Times Daily      glucose blood test strip   Use as instructed      insulin detemir 100 UNIT/ML injection  Commonly known as: LEVEMIR  Notes to patient: 08/12/2021   45 Units, Subcutaneous, Every 12 Hours Scheduled      ipratropium-albuterol 0.5-2.5 mg/3 ml nebulizer  Commonly known as: DUO-NEB  Notes to patient: 08/12/2021   3 mL, Nebulization, 4 Times Daily - RT      isosorbide mononitrate 30 MG 24 hr tablet  Commonly known as: IMDUR  Notes to patient: 08/13/2021   30 mg, Oral, Daily      lidocaine 5 %  Commonly known as: LIDODERM  Notes to patient: As directed   PLACE 1 PATCH ON THE SKIN AS DIRECTED BY PROVIDER DAILY. REMOVE & DISCARD PATCH WITHIN 12 HOURS OR AS DIRECTED  BY MD      lisinopril 10 MG tablet  Commonly known as: PRINIVILZESTRIL  Notes to patient: 08/13/2021   10 mg, Oral, Daily      montelukast 10 MG tablet  Commonly known as: SINGULAIR  Notes to patient: 08/12/2021   10 mg, Oral, Nightly      nitroglycerin 0.4 MG SL tablet  Commonly known as: NITROSTAT  Notes to patient: As needed   0.4 mg, Sublingual, Every 5 Minutes PRN      NovoLOG FlexPen 100 UNIT/ML solution pen-injector sc pen  Generic drug: insulin aspart  Notes to patient: 08/12/2021   20 Units, Subcutaneous, 3 Times Daily With Meals      nystatin 388970 UNIT/GM powder  Commonly known as: MYCOSTATIN  Notes to patient: 08/12/2021   Topical, 3 Times Daily      ondansetron ODT 4 MG disintegrating tablet  Commonly known as: Zofran ODT  Notes to patient: As needed   4 mg, Translingual, Every 8 Hours PRN      oxybutynin XL 5 MG 24 hr tablet  Commonly known as: DITROPAN-XL  Notes to patient: 08/13/2021   5 mg, Oral, Daily      pantoprazole 40 MG EC tablet  Commonly known as: PROTONIX  Notes to patient: 08/12/2021   40 mg, Oral, Nightly      sodium bicarbonate 650 MG tablet  Notes to patient: 08/12/2021   650 mg, Oral, 2 Times Daily         Stop These Medications    furosemide 20 MG tablet  Commonly known as: LASIX            Discharge Diet:   Diet Instructions     Diet: Consistent Carbohydrate, Cardiac, Renal, Specialty Diet; Thin Liquids, No Restrictions; Low Sodium      Discharge Diet:  Consistent Carbohydrate  Cardiac  Renal  Specialty Diet       Fluid Consistency: Thin Liquids, No Restrictions    Specialty Diets: Low Sodium    Fluid Restriction per day: 1500 mL Fluid          Activity at Discharge:   Activity Instructions     Activity as Tolerated            Discharge Care Plan/Instructions: As above    Follow-up Appointment:  Additional Instructions for the Follow-ups that You Need to Schedule     Ambulatory Referral to Home Health   As directed      Face to Face Visit Date: 8/10/2021    Follow-up provider for  Plan of Care?: I treated the patient in an acute care facility and will not continue treatment after discharge.    Follow-up provider: RIANNA QUINN [[id number]]    Reason/Clinical Findings: DECREASED MOBILITY    Describe mobility limitations that make leaving home difficult: DECREASED MOBILITY    Nursing/Therapeutic Services Requested: Skilled Nursing Physical Therapy Occupational Therapy    Skilled nursing orders: Cardiopulmonary assessments Telehealth    PT orders: Total joint pathway Therapeutic exercise Strengthening Home safety assessment    Occupational orders: Activities of daily living Energy conservation Strengthening Home safety assessment    Frequency: 1 Week 1            Contact information for follow-up providers     Ace Lauren MD Follow up on 8/18/2021.    Specialty: Nephrology  Why: Wednesday at 12:15 pm  Contact information:  [address] [facility] [address] [phone]             iRanna Quinn MD Follow up in 1 week(s).    Specialty: Family Medicine  Contact information:  [address] [place] [address] [phone]                   Contact information for after-discharge care     Home Medical Care     [facility] .    Service: Home Health Services  Contact information:  [address] [facility] [address] [phone]                               This document has been electronically signed by BRIDGETT Ansari on August 12, 2021 18:35 CDT        Time: Greater than 30 minutes.

## 2021-08-12 NOTE — THERAPY TREATMENT NOTE
Patient Name: Yamileth Slater  : 1960    MRN: [id number]                              Today's Date: 2021       Admit Date: 2021    Visit Dx:     ICD-10-CM ICD-9-CM   1. Cellulitis of left leg  L03.116 682.6   2. Anemia, unspecified type  D64.9 285.9   3. Chronic kidney disease, unspecified CKD stage  N18.9 585.9   4. Type 2 diabetes mellitus with hyperglycemia, unspecified whether long term insulin use (CMS/[facility])  E11.65 250.00   5. Impaired mobility and ADLs  Z74.09 V49.89    Z78.9      Patient Active Problem List   Diagnosis   • Type 2 diabetes mellitus with diabetic polyneuropathy, with long-term current use of insulin (CMS/[facility])   • Chronic obstructive pulmonary disease (CMS/[facility])   • Chronic midline low back pain without sciatica   • Vitamin D deficiency   • Type 2 diabetes mellitus (CMS/[facility])   • Tobacco dependence syndrome   • Surgical follow-up care   • Shoulder joint pain   • Peripheral vascular disease (CMS/[facility])   • Pain   • Neurologic disorder associated with diabetes mellitus (CMS/[facility])   • Morbid obesity with BMI of 50.0-59.9, adult (CMS/[facility])   • Mixed hyperlipidemia   • Kidney stone   • History of colon polyps   • GERD (gastroesophageal reflux disease)   • Excoriated eczema   • Essential hypertension   • Encounter for medication refill   • Dyslipidemia   • Diabetes mellitus (CMS/[facility])   • Coronary artery disease   • Chronic obstructive lung disease (CMS/[facility])   • Chronic folliculitis   • Chest pain   • Mild persistent asthma   • 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   • Uncontrolled type 2 diabetes mellitus with complication, with long-term current use of insulin (CMS/[facility])   • Anemia   • Hypothyroidism   • Carotid artery disease (CMS/[facility])   • Current smoker   • DM type 2 with diabetic peripheral neuropathy (CMS/[facility])   • [place] abuse   • PAD (peripheral artery disease) (CMS/[facility])   • S/P CABG x 3   • Intercostal pain   • Venous insufficiency   • Dyspnea on exertion    • LAFB (left anterior fascicular block)   • Pulmonary hypertension (CMS/[facility])   • Left hip pain   • Neck pain   • Acute kidney injury superimposed on chronic kidney disease (CMS/HCC)   • MIKE and COPD overlap syndrome (CMS/[facility])   • Pneumonia due to COVID-19 virus   • CKD (chronic kidney disease) stage 4, GFR 15-29 ml/min (CMS/[facility])   • Morbid obesity (CMS/[facility])   • Type 2 diabetes mellitus with hyperglycemia, with long-term current use of insulin (CMS/[facility])   • Hyponatremia   • Hyperkalemia   • Anemia   • Cutaneous candidiasis   • Community acquired pneumonia of right middle lobe of lung   • MRSA infection   • Alkaline phosphatase elevation   • COVID-19 ruled out by laboratory testing   • Esophageal dysphagia   • Monilial esophagitis (CMS/[facility])   • NSTEMI, initial episode of care (CMS/[facility])   • CAD (coronary artery disease)   • Gastrointestinal hemorrhage   • Chronic respiratory failure with hypoxia (CMS/[facility])   • Pneumonia due to infectious organism   • Chronic hypercapnic respiratory failure (CMS/[facility])   • Cellulitis of left leg     Past Medical History:   Diagnosis Date   • Anxiety    • Carotid artery stenosis    • Chronic obstructive lung disease (CMS/[facility])    • CKD (chronic kidney disease) stage 4, GFR 15-29 ml/min (CMS/[facility])    • Colonic polyp    • Coronary arteriosclerosis    • Diabetes mellitus (CMS/[facility])    • Diabetic neuropathy (CMS/[facility])    • GERD (gastroesophageal reflux disease)    • History of transfusion    • Hypercholesterolemia    • Hypertension    • Hypomagnesemia 6/27/2021    Monitor and replace   • Morbid obesity (CMS/[facility])    • Nephrolithiasis    • Peripheral vascular disease (CMS/[facility])    • Sleep apnea    • Substance abuse (CMS/[facility])    • Vitamin D deficiency      Past Surgical History:   Procedure Laterality Date   • CARDIAC CATHETERIZATION N/A 7/14/2020   • CARDIAC CATHETERIZATION N/A 4/23/2021    Procedure: Left Heart Cath;  Surgeon: Melba Romo MD;  Location: [facility] CATH INVASIVE LOCATION;   Service: Cardiology;  Laterality: N/A;   • CARDIAC CATHETERIZATION N/A 4/30/2021    Procedure: Percutaneous Coronary Intervention;  Surgeon: Russell Voss MD;  Location: [facility] CATH INVASIVE LOCATION;  Service: Cardiovascular;  Laterality: N/A;   • CARDIAC CATHETERIZATION N/A 4/30/2021    Procedure: Stent NIKKI coronary;  Surgeon: Russell Voss MD;  Location: [facility] CATH INVASIVE LOCATION;  Service: Cardiovascular;  Laterality: N/A;   • CAROTID STENT Left    • COLONOSCOPY     • COLONOSCOPY N/A 5/14/2021    Procedure: COLONOSCOPY;  Surgeon: Mingo Duarte MD;  Location: [facility] ENDOSCOPY;  Service: Gastroenterology;  Laterality: N/A;   • CORONARY ARTERY BYPASS GRAFT N/A 2013    CABG X 3   • CYSTOSCOPY BLADDER STONE LITHOTRIPSY Bilateral    • ENDOSCOPY N/A 4/12/2021    Procedure: ESOPHAGOGASTRODUODENOSCOPY;  Surgeon: Mingo Duarte MD;  Location: [facility] ENDOSCOPY;  Service: Gastroenterology;  Laterality: N/A;   • ENDOSCOPY N/A 5/14/2021    Procedure: ESOPHAGOGASTRODUODENOSCOPY;  Surgeon: Mingo Duarte MD;  Location: [facility] ENDOSCOPY;  Service: Gastroenterology;  Laterality: N/A;     General Information     Row Name 08/12/21 0843          OT Time and Intention    Document Type  therapy note (daily note)  -BB     Mode of Treatment  individual therapy;occupational therapy  -BB     Row Name 08/12/21 0843          General Information    Patient Profile Reviewed  yes  -BB     Existing Precautions/Restrictions  fall;oxygen therapy device and L/min  -BB     Row Name 08/12/21 0843          Cognition    Orientation Status (Cognition)  oriented x 4  -BB     Row Name 08/12/21 0843          Safety Issues, Functional Mobility    Impairments Affecting Function (Mobility)  balance;endurance/activity tolerance;coordination;shortness of breath;strength  -BB       User Key  (r) = Recorded By, (t) = Taken By, (c) = Cosigned By    Initials Name Provider Type    BB Nissa Bey COTA/AISHWARYA Occupational  Therapy Assistant          Mobility/ADL's     Row Name 08/12/21 0843          Bed Mobility    Bed Mobility  supine-sit;sit-supine;scooting/bridging  -BB     Scooting/Bridging Sebastian (Bed Mobility)  not tested  -BB     Supine-Sit Sebastian (Bed Mobility)  independent  -BB     Sit-Supine Sebastian (Bed Mobility)  independent  -BB     Assistive Device (Bed Mobility)  bed rails;head of bed elevated  -     Row Name 08/12/21 0843          Transfers    Sit-Stand Sebastian (Transfers)  standby assist  -BB     Sebastian Level (Toilet Transfer)  contact guard  -     Assistive Device (Toilet Transfer)  commode;walker, front-wheeled;grab bars/safety frame  -     Row Name 08/12/21 0843          Sit-Stand Transfer    Assistive Device (Sit-Stand Transfers)  walker, front-wheeled  -     Row Name 08/12/21 0843          Toilet Transfer    Type (Toilet Transfer)  sit-stand;stand-sit  -     Row Name 08/12/21 0843          Functional Mobility    Functional Mobility- Ind. Level  contact guard assist  -BB     Functional Mobility- Device  rolling walker  -     Row Name 08/12/21 0843          Toileting Assessment/Training    Sebastian Level (Toileting)  adjust/manage clothing;perform perineal hygiene;maximum assist (25% patient effort)  -     Assistive Devices (Toileting)  -- commode over toilet  -     Position (Toileting)  supported standing  -     Row Name 08/12/21 0843          Grooming Assessment/Training    Sebastian Level (Grooming)  hair care, combing/brushing;oral care regimen;wash face, hands;modified independence  -BB     Position (Grooming)  edge of bed sitting  -     Row Name 08/12/21 0843          Bathing Assessment/Intervention    Comment (Bathing)  pt already had  a bath today  -     Row Name 08/12/21 0843          Upper Body Dressing Assessment/Training    Sebastian Level (Upper Body Dressing)  -- hospital gown   -       User Key  (r) = Recorded By, (t) = Taken By, (c) =  Cosigned By    Initials Name Provider Type    Nissa Guillermo COTA/L Occupational Therapy Assistant        Obj/Interventions     Row Name 08/12/21 0843          Range of Motion Comprehensive    General Range of Motion  no range of motion deficits identified  -BB     Row Name 08/12/21 0843          Strength Comprehensive (MMT)    General Manual Muscle Testing (MMT) Assessment  other (see comments)  -BB     Row Name 08/12/21 0843          Shoulder (Therapeutic Exercise)    Shoulder Strengthening (Therapeutic Exercise)  bilateral;flexion;extension;2 lb free weight;sitting 1x20  -BB     Row Name 08/12/21 0843          Elbow/Forearm (Therapeutic Exercise)    Elbow/Forearm Strengthening (Therapeutic Exercise)  bilateral;flexion;extension;supination;pronation;2 lb free weight;sitting 1x20  -BB     Row Name 08/12/21 0843          Wrist (Therapeutic Exercise)    Wrist (Therapeutic Exercise)  strengthening exercise  -BB     Wrist AROM (Therapeutic Exercise)  bilateral;flexion;extension  -BB     Wrist Strengthening (Therapeutic Exercise)  2 lb free weight 1x20  -BB     Row Name 08/12/21 0843          Hand (Therapeutic Exercise)    Hand AROM/AAROM (Therapeutic Exercise)  bilateral;finger flexion;finger extension 1x20  -BB       User Key  (r) = Recorded By, (t) = Taken By, (c) = Cosigned By    Initials Name Provider Type    Nissa Giullermo COTA/L Occupational Therapy Assistant        Goals/Plan     Row Name 08/12/21 0843          Transfer Goal 1 (OT)    Activity/Assistive Device (Transfer Goal 1, OT)  toilet  -BB     [place] Level/Cues Needed (Transfer Goal 1, OT)  supervision required  -BB     Time Frame (Transfer Goal 1, OT)  long term goal (LTG);by discharge  -BB     Progress/Outcome (Transfer Goal 1, OT)  goal not met  -BB     Row Name 08/12/21 0843          Bathing Goal 1 (OT)    Activity/Device (Bathing Goal 1, OT)  upper body bathing AD as needed  -BB     [place] Level/Cues Needed (Bathing Goal  1, OT)  set-up required;supervision required  -BB     Time Frame (Bathing Goal 1, OT)  long term goal (LTG);by discharge  -BB     Progress/Outcomes (Bathing Goal 1, OT)  goal not met  -BB     Row Name 08/12/21 0843          Dressing Goal 1 (OT)    Activity/Device (Dressing Goal 1, OT)  upper body dressing AD as needed  -BB     [place]/Cues Needed (Dressing Goal 1, OT)  supervision required;set-up required  -BB     Time Frame (Dressing Goal 1, OT)  long term goal (LTG);by discharge  -BB     Progress/Outcome (Dressing Goal 1, OT)  goal not met  -BB     Row Name 08/12/21 0843          Toileting Goal 1 (OT)    Activity/Device (Toileting Goal 1, OT)  toileting skills, all  -BB     [place] Level/Cues Needed (Toileting Goal 1, OT)  set-up required;supervision required  -BB     Time Frame (Toileting Goal 1, OT)  long term goal (LTG);by discharge  -BB     Progress/Outcome (Toileting Goal 1, OT)  goal not met  -BB       User Key  (r) = Recorded By, (t) = Taken By, (c) = Cosigned By    Initials Name Provider Type    BB Nissa Bey COTA/L Occupational Therapy Assistant        Clinical Impression     Row Name 08/12/21 0843          Pain Scale: Numbers Pre/Post-Treatment    Pretreatment Pain Rating  7/10  -BB     Posttreatment Pain Rating  7/10  -BB     Pain Location - Side  Left  -BB     Pain Location - Orientation  lower  -BB     Pain Location  extremity  -BB     Pain Intervention(s)  Repositioned  -BB     Row Name 08/12/21 0843          Plan of Care Review    Plan of Care Reviewed With  patient  -BB     Progress  no change  -BB     Outcome Summary  Pt is CGA for bed<>toilet t/f using RW. Pt already had a bath this am. Pr performed B UE exercises with 2 lb wt. All needs within reach, Continue OT POC  -BB     Row Name 08/12/21 0843          Therapy Assessment/Plan (OT)    Rehab Potential (OT)  good, to achieve stated therapy goals  -BB     Criteria for Skilled Therapeutic Interventions Met (OT)  yes;meets  criteria;skilled treatment is necessary  -BB     Therapy Frequency (OT)  other (see comments) 5-7 days per week  -BB     Row Name 08/12/21 0843          Therapy Plan Review/Discharge Plan (OT)    Anticipated Discharge Disposition (OT)  home;home with assist;home with home health  -BB     Row Name 08/12/21 0843          Vital Signs    Pretreatment Heart Rate (beats/min)  58  -BB     Pre SpO2 (%)  97  -BB     O2 Delivery Pre Treatment  supplemental O2  -BB     Pre Patient Position  Supine  -BB     Row Name 08/12/21 0843          Positioning and Restraints    Pre-Treatment Position  in bed  -BB     Post Treatment Position  bed  -BB     In Bed  fowlers;call light within reach;encouraged to call for assist;exit alarm on  -BB       User Key  (r) = Recorded By, (t) = Taken By, (c) = Cosigned By    Initials Name Provider Type    Nissa Guillermo COTA/L Occupational Therapy Assistant        Outcome Measures     Row Name 08/12/21 0843          How much help from another is currently needed...    Putting on and taking off regular lower body clothing?  2  -BB     Bathing (including washing, rinsing, and drying)  2  -BB     Toileting (which includes using toilet bed pan or urinal)  2  -BB     Putting on and taking off regular upper body clothing  4  -BB     Taking care of personal grooming (such as brushing teeth)  4  -BB     Eating meals  4  -BB     AM-PAC 6 Clicks Score (OT)  18  -BB       User Key  (r) = Recorded By, (t) = Taken By, (c) = Cosigned By    Initials Name Provider Type    Nissa Guillermo COTA/L Occupational Therapy Assistant          Occupational Therapy Education                 Title: PT OT SLP Therapies (Done)     Topic: Occupational Therapy (Done)     Point: ADL training (Done)     Description:   Instruct learner(s) on proper safety adaptation and remediation techniques during self care or transfers.   Instruct in proper use of assistive devices.              Learning Progress Summary            Patient Acceptance, E,TB, VU by LW at 8/6/2021 1259    Acceptance, E,TB, VU,NR by  at 8/5/2021 0824    Comment: POC, role of OT, transfer training    Acceptance, E, VU by AS at 8/4/2021 1325    Comment: UE strengthening exercises    Acceptance, E,TB, VU by SC at 7/31/2021 1007                   Point: Home exercise program (Done)     Description:   Instruct learner(s) on appropriate technique for monitoring, assisting and/or progressing therapeutic exercises/activities.              Learning Progress Summary           Patient Acceptance, E,TB, VU by LW at 8/6/2021 1259    Acceptance, E, VU by AS at 8/4/2021 1325    Comment: UE strengthening exercises    Acceptance, E,TB, VU by SC at 7/31/2021 1007                   Point: Precautions (Done)     Description:   Instruct learner(s) on prescribed precautions during self-care and functional transfers.              Learning Progress Summary           Patient Acceptance, E,TB, VU by LW at 8/6/2021 1259    Acceptance, E,TB, VU,NR by  at 8/5/2021 0824    Comment: POC, role of OT, transfer training    Acceptance, E, VU by AS at 8/4/2021 1325    Comment: UE strengthening exercises    Acceptance, E, VU by ME at 8/3/2021 1508    Comment: Educated on OT and POC. Educated to call for assistance. Educated on safety precautions.    Acceptance, E,TB, VU by SC at 7/31/2021 1007                   Point: Body mechanics (Done)     Description:   Instruct learner(s) on proper positioning and spine alignment during self-care, functional mobility activities and/or exercises.              Learning Progress Summary           Patient Acceptance, E, VU by BB at 8/10/2021 1303    Acceptance, E,TB, VU by LW at 8/6/2021 1259    Acceptance, E,TB, VU,NR by  at 8/5/2021 0824    Comment: POC, role of OT, transfer training    Acceptance, E, VU by ME at 8/3/2021 1508    Comment: Educated on OT and POC. Educated to call for assistance. Educated on safety precautions.    Acceptance, E,TB, VU by SC  at 7/31/2021 1007                               User Key     Initials Effective Dates Name Provider Type Discipline    BB 06/16/21 -  Nissa Bey, OLMSTEAD/L Occupational Therapy Assistant OT    LW 06/16/21 -  Rekha Perez OLMSTEAD/L Occupational Therapy Assistant OT    AS 03/18/21 -  Arti Navarrete, OT Occupational Therapist OT    ME 06/16/21 -  Nikole Espino OTR/L Occupational Therapist OT    SJ 06/14/21 -  Ottoniel Robles OT Occupational Therapist OT    SC 07/21/21 -  Angela West RN Registered Nurse Nurse              OT Recommendation and Plan  Therapy Frequency (OT): other (see comments) (5-7 days per week)  Plan of Care Review  Plan of Care Reviewed With: patient  Progress: no change  Outcome Summary: Pt is CGA for bed<>toilet t/f using RW. Pt already had a bath this am. Pr performed B UE exercises with 2 lb wt. All needs within reach, Continue OT POC     Time Calculation:   Time Calculation- OT     Row Name 08/12/21 1307             Time Calculation- OT    OT Start Time  0843  -BB      OT Stop Time  0923  -BB      OT Time Calculation (min)  40 min  -BB      Total Timed Code Minutes- OT  40 minute(s)  -BB      OT Received On  08/12/21  -BB         Timed Charges    15231 - OT Therapeutic Exercise Minutes  25  -BB      51459 - OT Self Care/Mgmt Minutes  15  -BB         Total Minutes    Timed Charges Total Minutes  40  -BB       Total Minutes  40  -BB        User Key  (r) = Recorded By, (t) = Taken By, (c) = Cosigned By    Initials Name Provider Type    BB Nissa Bey OLMSTEAD/L Occupational Therapy Assistant        Therapy Charges for Today     Code Description Service Date Service Provider Modifiers Qty    87463253029 HC OT THER PROC EA 15 MIN 8/11/2021 Nissa Bey OLMSTEAD/L GO 2    78291837391 HC OT THER PROC EA 15 MIN 8/12/2021 Nissa Bey COTA/L GO 2    37717386434 HC OT SELF CARE/MGMT/TRAIN EA 15 MIN 8/12/2021 Nissa Bey OLMSTEAD/L GO 1               Nissa FLORES  Maida, OLMSTEAD/L  8/12/2021

## 2021-08-12 NOTE — DISCHARGE PLACEMENT REQUEST
"Yamileth Slater (61 y.o. Female)     Date of Birth Social Security Number Address Home Phone MRN    1960  [address]  [facility] [address] [phone] [id number]    Confucianist Marital Status          None        Admission Date Admission Type Admitting Provider Attending Provider Department, Room/Bed    7/30/21 Emergency Artis Martinez DO Patel, Nimish Girish, DO [facility] 3 EAST, 371/1    Discharge Date Discharge Disposition Discharge Destination         Home-Health Care [facility]              Attending Provider: Artis Martinez DO    Allergies: Adhesive Tape, Other    Isolation: Contact   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)   Code Status: CPR    Ht: 167.6 cm (66\")   Wt: 134 kg (294 lb 8 oz)    Admission Cmt: None   Principal Problem: None                Active Insurance as of 7/30/2021     Primary Coverage     Payor Plan Insurance Group Employer/Plan Group    HUMANA MEDICARE REPLACEMENT HUMANA MEDICARE REPLACEMENT [id number]     Payor Plan Address Payor Plan Phone Number Payor Plan Fax Number Effective Dates    [address] [address] [phone]  7/1/2020 - None Entered    [facility] [address]       Subscriber Name Subscriber Birth Date Member ID       YAMILETH SLATER 1960 [id number]           Secondary Coverage     Payor Plan Insurance Group Employer/Plan Group    [place]      Payor Plan Address Payor Plan Phone Number Payor Plan Fax Number Effective Dates    [address] [phone]  6/28/2019 - None Entered    [facility] [id number]       Subscriber Name Subscriber Birth Date Member ID       YAMILETH SLATER 1960 [id number]                 Emergency Contacts      (Rel.) Home Phone Work Phone Mobile Phone    Huy Slater (Spouse) [phone] -- [phone]    Eddi Chavezna (Daughter) [phone] -- [phone]    MiguelSuzanne (Daughter) [phone] -- [phone]        [facility] 3 " [address] [address]  Phone:  [phone]  Fax:  [phone] Date: Aug 12, 2021      Ambulatory Referral to Home Health     Patient:  Yamileth Slater MRN:  [id number]   [address]  [place] [address] :  1960  SSN:    Phone: [phone] Sex:  F      INSURANCE PAYOR PLAN GROUP # SUBSCRIBER ID   Primary:  Secondary:    HUMANA MEDICARE REPLACEMENT  [place] [id number]  [id number]    [id number]  [id number]      Referring Provider Information:  OSCAR AUGUST Phone: [phone] Fax: [phone]      Referral Information:   # Visits:  999 Referral Type: Home Health [42]   Urgency:  Routine Referral Reason: Specialty Services Required   Start Date: Aug 12, 2021 End Date:  To be determined by Insurer   Diagnosis: Impaired mobility and ADLs (Z74.09,Z78.9 [ICD-10-CM] V49.89 [ICD-9-CM])      Refer to Dept:   Refer to Provider:   Refer to Facility:       Face to Face Visit Date: 8/10/2021  Follow-up provider for Plan of Care? I treated the patient in an acute care facility and will not continue treatment after discharge.  Follow-up provider: MAITE QUINN [[id number]]  Reason/Clinical Findings: DECREASED MOBILITY  Describe mobility limitations that make leaving home difficult: DECREASED MOBILITY  Nursing/Therapeutic Services Requested: Skilled Nursing  Nursing/Therapeutic Services Requested: Physical Therapy  Nursing/Therapeutic Services Requested: Occupational Therapy  Skilled nursing orders: Cardiopulmonary assessments  Skilled nursing orders: Telehealth  PT orders: Total joint pathway  PT orders: Therapeutic exercise  PT orders: Strengthening  PT orders: Home safety assessment  Occupational orders: Activities of daily living  Occupational orders: Energy conservation  Occupational orders: Strengthening  Occupational orders: Home safety assessment  Frequency: 1 Week 1     This document serves as a request of services and does not  constitute Insurance authorization or approval of services.  To determine eligibility, please contact the members Insurance carrier to verify and review coverage.     If you have medical questions regarding this request for services. Please contact [address] at [phone] during normal business hours.       Authorizing Provider:Dona De La Garza APRN  Authorizing Provider's NPI: [id number]  Order Entered By: Dona De La Garza APRN 8/12/2021 10:37 AM     Electronically signed by: Dona De La Garza APRN 8/12/2021 10:37 AM               History & Physical      Martinez, Artisfaina Gallegos, DO at 07/30/21 2201                [facility] Medicine Admission      Date of Admission: 7/30/2021      Primary Care Physician: Rianna Macias MD      Chief Complaint: L LE pain and erythema    HPI: 60yo female with past medical hx of htn, hyperlipidemia, IDDM, ckd, cad, copd who was also recently discharged from [facility] 07.28.2021 secondary to AMS, recently discharged [facility] 07.01.2021 secondary to ERIKA/CKD, comes to our hospital due to progressively worsening of LLE pain, swelling, erythema for the past 5 days. Pt states there was no trauma. Pt does not know what abx she was given at [facility] for her L LE cellululitis. Pt c/o  subjective fever.  Denies chest pain, dyspnea. abd pain, nausea/vomiting.    Concurrent Medical History:  has a past medical history of Anxiety, Carotid artery stenosis, Chronic obstructive lung disease (CMS/[facility]), CKD (chronic kidney disease) stage 4, GFR 15-29 ml/min (CMS/[facility]), Colonic polyp, Coronary arteriosclerosis, Diabetes mellitus (CMS/[facility]), Diabetic neuropathy (CMS/[facility]), GERD (gastroesophageal reflux disease), History of transfusion, Hypercholesterolemia, Hypertension, Hypomagnesemia (6/27/2021), Morbid obesity (CMS/[facility]), Nephrolithiasis, Peripheral vascular disease (CMS/[facility]), Sleep apnea, Substance abuse (CMS/[facility]), and Vitamin D  deficiency.    Past Surgical History:  has a past surgical history that includes Colonoscopy; Carotid stent (Left); Cardiac catheterization (N/A, 7/14/2020); cystoscopy bladder stone lithotripsy (Bilateral); Esophagogastroduodenoscopy (N/A, 4/12/2021); Cardiac catheterization (N/A, 4/23/2021); Cardiac catheterization (N/A, 4/30/2021); Cardiac catheterization (N/A, 4/30/2021); Esophagogastroduodenoscopy (N/A, 5/14/2021); Colonoscopy (N/A, 5/14/2021); and Coronary artery bypass graft (N/A, 2013).    Family History: family history includes Heart disease in her brother, father, mother, and sister.     Social History:  reports that she has been smoking cigarettes. She has a 11.50 pack-year smoking history. She has never used smokeless tobacco. She reports previous drug use. Drugs: LSD, Marijuana, and Methamphetamines. She reports that she does not drink alcohol.    Allergies:   Allergies   Allergen Reactions   • Adhesive Tape Hives   • Other      Bandaids, MRSA, SURECLOSE       Medications:   Prior to Admission medications    Medication Sig Start Date End Date Taking? Authorizing Provider   albuterol sulfate  (90 Base) MCG/ACT inhaler Inhale 2 puffs Every 4 (Four) Hours As Needed for Wheezing. 3/26/21   Nirmal Calvillo MD   aspirin (aspirin) 81 MG EC tablet Take 1 tablet by mouth Daily. 5/1/21   Jr Jaime Estrada MD   atorvastatin (LIPITOR) 40 MG tablet Take 1 tablet by mouth Daily.  Patient taking differently: Take 40 mg by mouth Every Night. 3/26/21   Nirmal Calvillo MD   Blood Glucose Monitoring Suppl ([employer] Blood Glucose Meter) w/Device kit 1 each 3 (Three) Times a Day. 10/9/20   Nirmal Calvillo MD   budesonide-formoterol (SYMBICORT) 160-4.5 MCG/ACT inhaler Inhale 2 puffs 2 (Two) Times a Day. 1/1/15   Provider, MD Caio   cetirizine (zyrTEC) 10 MG tablet Take 1 tablet by mouth Daily. 3/26/21   Nirmal Calvillo MD   clopidogrel (PLAVIX) 75 MG tablet Take 1 tablet by  mouth Daily. 3/26/21   Nirmal Calvillo MD   Continuous Blood Gluc  (FreeStyle Jade 2 [place]) device 1 each Continuous. 3/31/21   Nirmal Calvillo MD   Continuous Blood Gluc Sensor (FreeStyle Jade 2 Sensor) misc 1 each Every 14 (Fourteen) Days. 3/31/21   Nirmal Calvillo MD   cyclobenzaprine (FLEXERIL) 10 MG tablet Take 1 tablet by mouth 2 (Two) Times a Day As Needed for Muscle Spasms. 3/26/21   Nirmal Calvillo MD   DULoxetine (CYMBALTA) 20 MG capsule TAKE 1 CAPSULE BY MOUTH DAILY. 7/8/21   Nirmal Calvillo MD   EASY TOUCH PEN NEEDLES 31G X 8 MM misc  8/7/20   Caio Thompson MD   ferrous sulfate (FeroSul) 325 (65 FE) MG tablet Take 1 tablet by mouth Daily With Breakfast. 3/26/21   Nirmal Calvillo MD   furosemide (LASIX) 20 MG tablet TAKE 1 TABLET BY MOUTH DAILY. 7/8/21   Paulina Solano MD   gabapentin (NEURONTIN) 800 MG tablet Take 1 tablet by mouth 3 (Three) Times a Day. 5/28/21   Nirmal Calvillo MD   glucose blood test strip Use as instructed 10/9/20   Nirmal Calvillo MD   insulin aspart (novoLOG FLEXPEN) 100 UNIT/ML solution pen-injector sc pen Inject 20 Units under the skin into the appropriate area as directed 3 (Three) Times a Day With Meals. 4/12/21   Umesh Giraldo III, MD   insulin detemir (LEVEMIR) 100 UNIT/ML injection Inject 45 Units under the skin into the appropriate area as directed Every 12 (Twelve) Hours for 30 days. 6/9/21 7/9/21  Nirmal Calvillo MD   Insulin Pen Needle (NovoFine) 30G X 8 MM misc As directed 4 times daily 3/26/21   Nirmal Calvillo MD   ipratropium-albuterol (DUO-NEB) 0.5-2.5 mg/3 ml nebulizer Take 3 mL by nebulization 4 (Four) Times a Day. 3/22/17   Caio Thompson MD   isosorbide mononitrate (IMDUR) 30 MG 24 hr tablet Take 1 tablet by mouth Daily. 3/26/21   Nirmal Calvillo MD   lidocaine (LIDODERM) 5 % PLACE 1 PATCH ON THE SKIN AS DIRECTED BY PROVIDER  DAILY. REMOVE & DISCARD PATCH WITHIN 12 HOURS OR AS DIRECTED BY MD 3/10/21   Nirmal Calvillo MD   lisinopril (PRINIVIL,ZESTRIL) 10 MG tablet Take 1 tablet by mouth Daily. 12/23/20   Nirmal Calvillo MD   metoprolol tartrate (LOPRESSOR) 100 MG tablet Take 1 tablet by mouth Every 12 (Twelve) Hours for 30 days.  Patient taking differently: Take 100 mg by mouth 2 (Two) Times a Day. 10/9/20 12/18/20  Nirmal Calvillo MD   montelukast (SINGULAIR) 10 MG tablet Take 1 tablet by mouth Every Night. 3/26/21   Nirmal Calvillo MD   nitroglycerin (NITROSTAT) 0.4 MG SL tablet Place 0.4 mg under the tongue Every 5 (Five) Minutes As Needed for Chest Pain (x 3 doses). 1/1/15   Provider, MD Caio   nystatin (MYCOSTATIN) 221311 UNIT/GM powder Apply  topically to the appropriate area as directed 3 (Three) Times a Day. 3/26/21   Nirmal Calvillo MD   ondansetron ODT (Zofran ODT) 4 MG disintegrating tablet Place 1 tablet on the tongue Every 8 (Eight) Hours As Needed for Nausea or Vomiting. 3/26/21   Nirmal Calvillo MD   oxybutynin XL (DITROPAN-XL) 5 MG 24 hr tablet Take 1 tablet by mouth Daily. 3/26/21   Nirmal Calvillo MD   pantoprazole (PROTONIX) 40 MG EC tablet Take 1 tablet by mouth Every Night. 3/26/21   Nirmal Calvillo MD   sodium bicarbonate 650 MG tablet Take 1 tablet by mouth 2 (Two) Times a Day. 4/12/21   Umesh Giraldo III, MD   sucralfate (CARAFATE) 1 g tablet Take 1 tablet by mouth 4 (Four) Times a Day. 4/12/21   Umesh Giraldo III, MD       Review of Systems:  Review of Systems   Otherwise complete ROS is negative except as mentioned above.    Physical Exam:   Temp:  [98.4 °F (36.9 °C)] 98.4 °F (36.9 °C)  Heart Rate:  [69-78] 69  Resp:  [18-20] 19  BP: (167-181)/(70-91) 174/75  Physical Exam  Vitals and nursing note reviewed.   Constitutional:       Appearance: Normal appearance. She is obese.   HENT:      Head: Normocephalic and atraumatic.       Mouth/Throat:      Mouth: Mucous membranes are dry.   Eyes:      Pupils: Pupils are equal, round, and reactive to light.   Cardiovascular:      Rate and Rhythm: Normal rate and regular rhythm.      Pulses: Normal pulses.      Heart sounds: Normal heart sounds. No murmur heard.   No friction rub. No gallop.    Pulmonary:      Effort: Pulmonary effort is normal. No respiratory distress.      Breath sounds: Normal breath sounds. No wheezing, rhonchi or rales.   Abdominal:      General: Abdomen is flat. Bowel sounds are normal. There is no distension.      Palpations: Abdomen is soft.      Tenderness: There is no abdominal tenderness. There is no guarding or rebound.   Musculoskeletal:      Cervical back: Normal range of motion and neck supple. No rigidity.        Legs:    Lymphadenopathy:      Cervical: No cervical adenopathy.   Skin:     General: Skin is warm and dry.      Findings: Rash present.   Neurological:      General: No focal deficit present.      Mental Status: She is alert and oriented to person, place, and time.      GCS: GCS eye subscore is 4. GCS verbal subscore is 5. GCS motor subscore is 6.         Results Reviewed:  I have personally reviewed current lab, radiology, and data and agree with results.  Lab Results (last 24 hours)     Procedure Component Value Units Date/Time    Troponin [[id number]]  (Normal) Collected: 07/30/21 2105    Specimen: Blood from Arm, Right Updated: 07/30/21 2130     Troponin T 0.022 ng/mL     Narrative:      Troponin T Reference Range:  <= 0.03 ng/mL-   Negative for AMI  >0.03 ng/mL-     Abnormal for myocardial necrosis.  Clinicians would have to utilize clinical acumen, EKG, Troponin and serial changes to determine if it is an Acute Myocardial Infarction or myocardial injury due to an underlying chronic condition.       Results may be falsely decreased if patient taking Biotin.      Comprehensive Metabolic Panel [[id number]]  (Abnormal) Collected: 07/30/21 2011     Specimen: Blood Updated: 07/30/21 2043     Glucose 305 mg/dL      BUN 41 mg/dL      Creatinine 2.08 mg/dL      Sodium 128 mmol/L      Potassium 3.7 mmol/L      Comment: Slight hemolysis detected by analyzer. Results may be affected.        Chloride 94 mmol/L      CO2 25.0 mmol/L      Calcium 8.7 mg/dL      Total Protein 7.9 g/dL      Albumin 3.00 g/dL      ALT (SGPT) 12 U/L      AST (SGOT) 19 U/L      Alkaline Phosphatase 206 U/L      Total Bilirubin 0.2 mg/dL      eGFR Non African Amer 24 mL/min/1.73      Globulin 4.9 gm/dL      A/G Ratio 0.6 g/dL      BUN/Creatinine Ratio 19.7     Anion Gap 9.0 mmol/L     Narrative:      GFR Normal >60  Chronic Kidney Disease <60  Kidney Failure <15      Troponin [[id number]]  (Normal) Collected: 07/30/21 2011    Specimen: Blood Updated: 07/30/21 2039     Troponin T 0.025 ng/mL     Narrative:      Troponin T Reference Range:  <= 0.03 ng/mL-   Negative for AMI  >0.03 ng/mL-     Abnormal for myocardial necrosis.  Clinicians would have to utilize clinical acumen, EKG, Troponin and serial changes to determine if it is an Acute Myocardial Infarction or myocardial injury due to an underlying chronic condition.       Results may be falsely decreased if patient taking Biotin.      BNP [[id number]]  (Abnormal) Collected: 07/30/21 2011    Specimen: Blood Updated: 07/30/21 2037     proBNP 2,090.0 pg/mL     Narrative:      Among patients with dyspnea, NT-proBNP is highly sensitive for the detection of acute congestive heart failure. In addition NT-proBNP of <300 pg/ml effectively rules out acute congestive heart failure with 99% negative predictive value.    Results may be falsely decreased if patient taking Biotin.      Blood Culture - Blood, Wrist, Right [[id number]] Collected: 07/30/21 2008    Specimen: Blood from Wrist, Right Updated: 07/30/21 2035    Blood Culture - Blood, Arm, Left [[id number]] Collected: 07/30/21 2011    Specimen: Blood from Arm, Left Updated: 07/30/21 2035    Lactic Acid,  Plasma [[id number]]  (Normal) Collected: 07/30/21 2011    Specimen: Blood Updated: 07/30/21 2035     Lactate 0.8 mmol/L     CBC & Differential [[id number]]  (Abnormal) Collected: 07/30/21 2011    Specimen: Blood Updated: 07/30/21 2027    Narrative:      The following orders were created for panel order CBC & Differential.  Procedure                               Abnormality         Status                     ---------                               -----------         ------                     CBC Auto Differential[[id number]]        Abnormal            Final result                 Please view results for these tests on the individual orders.    CBC Auto Differential [[id number]]  (Abnormal) Collected: 07/30/21 2011    Specimen: Blood Updated: 07/30/21 2027     WBC 9.11 10*3/mm3      RBC 2.99 10*6/mm3      Hemoglobin 8.2 g/dL      Hematocrit 26.3 %      MCV 88.0 fL      MCH 27.4 pg      MCHC 31.2 g/dL      RDW 15.2 %      RDW-SD 48.9 fl      MPV 8.6 fL      Platelets 340 10*3/mm3      Neutrophil % 71.1 %      Lymphocyte % 20.6 %      Monocyte % 4.2 %      Eosinophil % 2.1 %      Basophil % 0.5 %      Immature Grans % 1.5 %      Neutrophils, Absolute 6.47 10*3/mm3      Lymphocytes, Absolute 1.88 10*3/mm3      Monocytes, Absolute 0.38 10*3/mm3      Eosinophils, Absolute 0.19 10*3/mm3      Basophils, Absolute 0.05 10*3/mm3      Immature Grans, Absolute 0.14 10*3/mm3      nRBC 0.0 /100 WBC     COVID-19 and FLU A/B PCR - Swab, Nasopharynx [[id number]]  (Normal) Collected: 07/30/21 1929    Specimen: Swab from Nasopharynx Updated: 07/30/21 2001     COVID19 Not Detected     Influenza A PCR Not Detected     Influenza B PCR Not Detected    Narrative:      Fact sheet for providers: https://www.fda.gov/media/186040/download    Fact sheet for patients: https://www.fda.gov/media/611207/download    Test performed by PCR.    Urinalysis With Microscopic If Indicated (No Culture) - Urine, Clean Catch [[id number]]  (Abnormal) Collected:  07/30/21 1929    Specimen: Urine, Clean Catch Updated: 07/30/21 1943     Color, UA Yellow     Appearance, UA Clear     pH, UA 6.5     Specific Gravity, UA 1.019     Glucose, UA >=1000 mg/dL (3+)     Ketones, UA Negative     Bilirubin, UA Negative     Blood, UA Small (1+)     Protein, UA >=300 mg/dL (3+)     Leuk Esterase, UA Negative     Nitrite, UA Negative     Urobilinogen, UA 0.2 E.U./dL    Urinalysis, Microscopic Only - Urine, Clean Catch [[id number]]  (Abnormal) Collected: 07/30/21 1929    Specimen: Urine, Clean Catch Updated: 07/30/21 1943     RBC, UA 3-5 /HPF      WBC, UA 3-5 /HPF      Bacteria, UA None Seen /HPF      Squamous Epithelial Cells, UA None Seen /HPF      Hyaline Casts, UA 0-2 /LPF      Methodology Automated Microscopy        Imaging Results (Last 24 Hours)     Procedure Component Value Units Date/Time    US Venous Doppler Lower Extremity Left (duplex) [[id number]] Collected: 07/30/21 2000     Updated: 07/30/21 2042    Narrative:      EXAM: US VENOUS DOPPLER LOWER EXTREMITY LEFT (DUPLEX)    CLINICAL INDICATION: Leg edema    COMPARISON: There is no previous study for comparison.     TECHNIQUE: Sonographic grayscale and color Doppler images of the  left lower extremity deep venous system were obtained including  Doppler waveforms.     FINDINGS:  There is normal flow, compressibility, and augmentation  throughout the left common femoral, femoral, and popliteal veins.  There are no echogenic filling defects to suggest thrombi.      Impression:      No evidence of left lower extremity DVT.     Electronically signed by:  Travis Hi MD  7/30/2021 8:41 PM CDT  Workstation: [id number]    XR Tibia Fibula 2 View Left [[id number]] Collected: 07/30/21 1933     Updated: 07/30/21 2007    Narrative:      EXAM: XR TIBIA FIBULA 2 VW LEFT    CLINICAL INDICATION: Pain    COMPARISON: There is no previous study for comparison.     FINDINGS: 2 views of the left tibia/fibula reveal no evidence of  any fracture. The osseous  structures appear intact and  unremarkable. There are no radiopaque foreign bodies.      Impression:      Negative left tibia/fibula radiographs.    Electronically signed by:  Travis Hi MD  7/30/2021 8:06 PM CDT  Workstation: [id number]    XR Chest 1 View [[id number]] Collected: 07/30/21 1919     Updated: 07/30/21 1953    Narrative:      EXAM DESCRIPTION:  XR CHEST 1 VW  [facility]: XR CHEST 1 VIEW     CLINICAL HISTORY:  61 years Female, edema    COMPARISON: Chest x-ray 6/27/2021.    FINDINGS:  Heart size remains prominent. Sternotomy wires are present. There  are mild interstitial opacities in the right mid to lower lung,  which may relate to edema or infection. No pneumothorax is seen.      Impression:      Mild right mid to lower lung interstitial opacities, which may  relate to edema or infection.    Electronically signed by:  Db Garces DO  7/30/2021 7:52 PM CDT  Workstation: [id number]            Assessment:    Active Hospital Problems    Diagnosis    • Cellulitis of left leg              Plan:      Cellulitis  -L LE  -will demarcate erythema and cw empiric abx tx  -cx pending  -cw home meds once reconcilled    dvt ppx heparin    I confirmed that the patient's Advance Care Plan is present, code status is documented, or surrogate decision maker is listed in the patient's medical record.     I have utilized all available immediate resources to obtain, update, or review the patient's current medications.     I discussed the patient's findings and my recommendations with: pt    [unfilled]                  Electronically signed by Artis Martinez DO at 08/03/21 8842

## 2021-08-12 NOTE — PROGRESS NOTES
"[facility]  [address] [place] [address]. [address]  T - [phone]  F - [phone]     Progress Note          PATIENT  DEMOGRAPHICS   PATIENT NAME: Yamileth Slater                      PHYSICIAN: Ace Lauren MD  : 1960  MRN: [id number]   LOS: 9 days    Patient Care Team:  Rianna Macias MD as PCP - General (Family Medicine)  Subjective   SUBJECTIVE   No acute events overnight.  Still has pain on left leg       Objective   OBJECTIVE   Vital Signs  Temp:  [97.6 °F (36.4 °C)-98.1 °F (36.7 °C)] 98.1 °F (36.7 °C)  Heart Rate:  [63-70] 63  Resp:  [18-20] 18  BP: (136-150)/(60-70) 150/67    Flowsheet Rows      First Filed Value   Admission Height  167.6 cm (66\") Documented at 2021   Admission Weight  127 kg (280 lb) Documented at 2021           I/O last 3 completed shifts:  In: 840 [P.O.:840]  Out: 2850 [Urine:2850]    PHYSICAL EXAM    Physical Exam  Constitutional:       General: She is not in acute distress.     Appearance: She is obese. She is ill-appearing.   HENT:      Head: Normocephalic and atraumatic.   Cardiovascular:      Rate and Rhythm: Normal rate and regular rhythm.   Pulmonary:      Effort: Pulmonary effort is normal.      Breath sounds: Normal breath sounds.   Abdominal:      General: Bowel sounds are normal. There is distension.   Musculoskeletal:      Right lower leg: Edema present.      Left lower leg: Edema present.   Skin:     General: Skin is warm and dry.      Coloration: Skin is pale.   Neurological:      General: No focal deficit present.      Mental Status: She is alert and oriented to person, place, and time.         RESULTS   Results Review:    Results from last 7 days   Lab Units 21  0810 21  0534 08/10/21  0525   SODIUM mmol/L 130* 132*  134* 137   POTASSIUM mmol/L 4.2 3.9  3.9 4.6   CHLORIDE mmol/L 95* 95*  97* 99   CO2 mmol/L 27.0 25.0  26.0 29.0   BUN mg/dL 46* 47*  47* 45*   CREATININE mg/dL 2.14* 2.40* "  2.49* 2.37*   CALCIUM mg/dL 8.5* 8.2*  8.1* 8.4*   BILIRUBIN mg/dL  --  <0.2  --    ALK PHOS U/L  --  134*  --    ALT (SGPT) U/L  --  5  --    AST (SGOT) U/L  --  12  --    GLUCOSE mg/dL 226* 230*  235* 171*       Estimated Creatinine Clearance: 38.4 mL/min (A) (by C-G formula based on SCr of 2.14 mg/dL (H)).                Results from last 7 days   Lab Units 08/12/21  0810 08/11/21  0534 08/10/21  0525 08/09/21  0533 08/08/21  0453   WBC 10*3/mm3 8.60 8.47 9.10 8.40 8.64   HEMOGLOBIN g/dL 8.0* 7.8* 7.8* 7.7* 7.7*   PLATELETS 10*3/mm3 290 281 295 295 300               Imaging Results (Last 24 Hours)     ** No results found for the last 24 hours. **           MEDICATIONS    acidophilus, 1 tablet, Oral, Daily  albumin human, 25 g, Intravenous, Daily  aspirin, 81 mg, Oral, Daily  atorvastatin, 40 mg, Oral, Daily  bumetanide, 1 mg, Oral, BID  cholecalciferol, 50,000 Units, Oral, Weekly  clopidogrel, 75 mg, Oral, Daily  docosanol, , Topical, 5x Daily  DULoxetine, 20 mg, Oral, Daily  gabapentin, 800 mg, Oral, TID  heparin (porcine), 5,000 Units, Subcutaneous, Q12H  insulin aspart, 0-24 Units, Subcutaneous, TID AC  insulin detemir, 60 Units, Subcutaneous, Q12H  isosorbide mononitrate, 30 mg, Oral, Daily  metoprolol tartrate, 100 mg, Oral, Q12H  oxybutynin XL, 5 mg, Oral, Daily  sodium chloride, 10 mL, Intravenous, Q12H           Assessment/Plan   ASSESSMENT / PLAN      Cellulitis of left leg    1.  ERIKA on CKD 4- Baseline creatinine around 2.0, up to 2.7 peak. Now 2.1-2.0mg/dL and up to 2.5 at one point and better with tapering of bumex    -keep bumex 1mg bid. Ok to dc albumin on discharge. F/u in office 2-3 weeks in the office     2.  Hyponatremia- sodium stable- fluid restriction 1000cc/day.      3.  Left lower extremity cellulitis- on vancomycin, flagyl and cefepime- per primary team, failed previous antibiotic course. Has u/s of leg didn't show abscess. Blood culture is negative at 5 days.      4.  History of  CAD     5.  History of COPD     6.  Anemia- hemoglobin low. Anemia is consistent with fe def / anemia of chronic disease and on IV Fe. Also has low b12 in OSH and received b12 IM, s/p epogen x1     7. Vitamin D deficiency- on vitamin d 50K weekly     8. Metabolic Acidosis- off sodium bicarb         This document has been electronically signed by Ace Lauren MD on August 12, 2021 08:57 CDT  .

## 2021-08-13 ENCOUNTER — TRANSITIONAL CARE MANAGEMENT TELEPHONE ENCOUNTER (OUTPATIENT)
Dept: CALL CENTER | Facility: HOSPITAL | Age: 61
End: 2021-08-13

## 2021-08-13 DIAGNOSIS — IMO0002 UNCONTROLLED TYPE 2 DIABETES MELLITUS WITH DIABETIC POLYNEUROPATHY, WITH LONG-TERM CURRENT USE OF INSULIN: ICD-10-CM

## 2021-08-13 DIAGNOSIS — E11.40 TYPE 2 DIABETES MELLITUS WITH DIABETIC NEUROPATHY, UNSPECIFIED WHETHER LONG TERM INSULIN USE (HCC): Primary | ICD-10-CM

## 2021-08-13 RX ORDER — GABAPENTIN 800 MG/1
800 TABLET ORAL 3 TIMES DAILY
Qty: 90 TABLET | Refills: 0 | Status: SHIPPED | OUTPATIENT
Start: 2021-08-13 | End: 2021-08-17 | Stop reason: SDUPTHER

## 2021-08-13 NOTE — OUTREACH NOTE
Call Center TCM Note      Responses   [facility] patient discharged from?  [place]   Does the patient have one of the following disease processes/diagnoses(primary or secondary)?  Other   TCM attempt successful?  No   Unsuccessful attempts  Attempt 2          Chelsy Weiss RN    8/13/2021, 16:05 EDT

## 2021-08-13 NOTE — OUTREACH NOTE
Call Center TCM Note      Responses   [facility] patient discharged from?  [place]   Does the patient have one of the following disease processes/diagnoses(primary or secondary)?  Other   TCM attempt successful?  No   Unsuccessful attempts  Attempt 1          Chelsy Weiss RN    8/13/2021, 15:24 EDT

## 2021-08-13 NOTE — PAYOR COMM NOTE
"Marivel Nunes   [facility]  phone [phone]  fax [phone]    Auth# [id number]    Yamileth Slater (61 y.o. 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)   Code Status: Prior    Ht: 167.6 cm (66\")   Wt: 134 kg (294 lb 8 oz)    Admission Cmt: None   Principal Problem: None                Active Insurance as of 7/30/2021     Primary Coverage     Payor Plan Insurance Group Employer/Plan Group    HUMANA MEDICARE REPLACEMENT HUMANA MEDICARE REPLACEMENT [id number]     Payor Plan Address Payor Plan Phone Number Payor Plan Fax Number Effective Dates    [address] [address] [phone]  7/1/2020 - None Entered    [facility] [address]       Subscriber Name Subscriber Birth Date Member ID       YAMILETH SLATER 1960 [id number]           Secondary Coverage     Payor Plan Insurance Group Employer/Plan Group    [place]      Payor Plan Address Payor Plan Phone Number Payor Plan Fax Number Effective Dates    [address] [phone]  6/28/2019 - None Entered    [facility] [id number]       Subscriber Name Subscriber Birth Date Member ID       YAMILETH SLATER 1960 [id number]                 Emergency Contacts      (Rel.) Home Phone Work Phone Mobile Phone    Huy Slater (Spouse) [phone] -- [phone]    Yamileth Chavez (Daughter) [phone] -- [phone]    Suzanne Rivera (Daughter) " [phone] -- [phone]               Discharge Summary      Dona De La Garza APRN at 08/12/21 1321     Attestation signed by Alvarez Escalona MD at 08/12/21 1914    I have discussed the case with Dona De La Garza NP and reviewed the note. I agree with the clinical decision as outlined in this note.      Alvarez Escalona MD  08/12/21  19:14 CDT                               [facility] Medicine Services  DISCHARGE SUMMARY       Date of Admission: 7/30/2021  Date of Discharge:  8/12/2021  Primary Care Physician: Rianna Macias MD    Presenting Problem/History of Present Illness:  Cellulitis of left leg [L03.116]  Anemia, unspecified type [D64.9]  Chronic kidney disease, unspecified CKD stage [N18.9]  Type 2 diabetes mellitus with hyperglycemia, unspecified whether long term insulin use (CMS/[facility]) [E11.65]     Final Discharge Diagnoses:  Active Hospital Problems    Diagnosis    • Cellulitis of left leg        Consults:   Consults     Date and Time Order Name Status Description    8/5/2021 10:11 AM Inpatient General Surgery Consult Completed     6/30/2021  8:45 AM Inpatient Nephrology Consult Completed                     Pertinent Test Results:   Lab Results (last 24 hours)     Procedure Component Value Units Date/Time    Basic Metabolic Panel [[id number]]  (Abnormal) Collected: 08/12/21 0810    Specimen: Blood Updated: 08/12/21 0834     Glucose 226 mg/dL      BUN 46 mg/dL      Creatinine 2.14 mg/dL      Sodium 130 mmol/L      Potassium 4.2 mmol/L      Chloride 95 mmol/L      CO2 27.0 mmol/L      Calcium 8.5 mg/dL      eGFR Non African Amer 23 mL/min/1.73      BUN/Creatinine Ratio 21.5     Anion Gap 8.0 mmol/L     Narrative:      GFR Normal >60  Chronic Kidney Disease <60  Kidney Failure <15      CBC & Differential [[id number]]  (Abnormal) Collected: 08/12/21 0810    Specimen: Blood Updated: 08/12/21 0817    Narrative:      The following orders were created for panel order CBC &  Differential.  Procedure                               Abnormality         Status                     ---------                               -----------         ------                     CBC Auto Differential[[id number]]        Abnormal            Final result                 Please view results for these tests on the individual orders.    CBC Auto Differential [[id number]]  (Abnormal) Collected: 08/12/21 0810    Specimen: Blood Updated: 08/12/21 0817     WBC 8.60 10*3/mm3      RBC 2.98 10*6/mm3      Hemoglobin 8.0 g/dL      Hematocrit 26.2 %      MCV 87.9 fL      MCH 26.8 pg      MCHC 30.5 g/dL      RDW 15.8 %      RDW-SD 50.0 fl      MPV 8.7 fL      Platelets 290 10*3/mm3      Neutrophil % 58.4 %      Lymphocyte % 29.1 %      Monocyte % 6.9 %      Eosinophil % 3.7 %      Basophil % 0.9 %      Immature Grans % 1.0 %      Neutrophils, Absolute 5.02 10*3/mm3      Lymphocytes, Absolute 2.50 10*3/mm3      Monocytes, Absolute 0.59 10*3/mm3      Eosinophils, Absolute 0.32 10*3/mm3      Basophils, Absolute 0.08 10*3/mm3      Immature Grans, Absolute 0.09 10*3/mm3      nRBC 0.2 /100 WBC     POC Glucose Once [[id number]]  (Abnormal) Collected: 08/12/21 0631    Specimen: Blood Updated: 08/12/21 0648     Glucose 224 mg/dL      Comment: RN NotifiedOperator: [id number] WASHINGTON MARCELMeter ID: [id number]       POC Glucose Once [[id number]]  (Abnormal) Collected: 08/11/21 2002    Specimen: Blood Updated: 08/11/21 2014     Glucose 165 mg/dL      Comment: RN NotifiedOperator: [id number] DARIEN AMBERMeter ID: [id number]           Imaging Results (Last 24 Hours)     ** No results found for the last 24 hours. **        Chief Complaint on Day of Discharge: No complaints    Hospital Course:  This is a 61-year-old female with past medical history of hypertension, hyperlipidemia, IDDM, CKD, CAD and COPD (oxygen dependent) that presented to [facility] on [facility]/30/2021 with complaints of worsening swelling and erythema of the left  "lower extremity.  Patient received a complete course of vancomycin, Flagyl and cefepime.  Nephrology was consulted secondary to ERIKA on CKD 4.  Patient required IV albumin during admission.  She will continue Bumex 1 mg twice daily and follow-up with Dr. Lauren in 2 to 3 weeks.  Follow with PCP in 1 week.  Home health was set up for physical and Occupational Therapy.    Condition on Discharge: Stable    Physical Exam on Discharge:  /67 (BP Location: Right arm, Patient Position: Lying)   Pulse 63   Temp 98.1 °F (36.7 °C) (Temporal)   Resp 18   Ht 167.6 cm (66\")   Wt 134 kg (294 lb 8 oz)   LMP  (LMP Unknown)   SpO2 98%   BMI 47.53 kg/m²   Physical Exam  Constitutional:       Appearance: She is well-developed.   HENT:      Head: Normocephalic and atraumatic.   Eyes:      Pupils: Pupils are equal, round, and reactive to light.   Cardiovascular:      Rate and Rhythm: Normal rate and regular rhythm.   Pulmonary:      Effort: Pulmonary effort is normal.      Breath sounds: Normal breath sounds.   Abdominal:      General: Bowel sounds are normal.      Palpations: Abdomen is soft.   Musculoskeletal:         General: Normal range of motion.      Cervical back: Normal range of motion and neck supple.   Skin:     General: Skin is warm and dry.   Neurological:      Mental Status: She is alert and oriented to person, place, and time.   Psychiatric:         Behavior: Behavior normal.       Discharge Disposition:  Home-Health Care [facility]    Discharge Medications:     Discharge Medications      New Medications      Instructions Start Date   bumetanide 1 MG tablet  Commonly known as: BUMEX  Notes to patient: 08/12/2021   1 mg, Oral, 2 Times Daily         Changes to Medications      Instructions Start Date   atorvastatin 40 MG tablet  Commonly known as: LIPITOR  What changed: when to take this  Notes to patient: 08/13/2021   40 mg, Oral, Daily      metoprolol tartrate 100 MG tablet  Commonly known as: LOPRESSOR  What " changed: when to take this  Notes to patient: 08/12/2021   100 mg, Oral, Every 12 Hours Scheduled         Continue These Medications      Instructions Start Date   Adult Aspirin Regimen 81 MG EC tablet  Generic drug: aspirin  Notes to patient: 08/13/2021   81 mg, Oral, Daily      albuterol sulfate  (90 Base) MCG/ACT inhaler  Commonly known as: PROVENTIL HFA;VENTOLIN HFA;PROAIR HFA  Notes to patient: As needed   2 puffs, Inhalation, Every 4 Hours PRN      cetirizine 10 MG tablet  Commonly known as: zyrTEC  Notes to patient: 08/13/2021   10 mg, Oral, Daily      clopidogrel 75 MG tablet  Commonly known as: PLAVIX  Notes to patient: 08/13/2021   75 mg, Oral, Daily      [employer] Blood Glucose Meter w/Device kit  Notes to patient: 08/12/2021   1 each, Does not apply, 3 Times Daily      cyclobenzaprine 10 MG tablet  Commonly known as: FLEXERIL  Notes to patient: As needed   10 mg, Oral, 2 Times Daily PRN      DULoxetine 20 MG capsule  Commonly known as: CYMBALTA  Notes to patient: 08/13/2021   20 mg, Oral, Daily      Easy Touch Pen Needles 31G X 8 MM misc  Generic drug: Insulin Pen Needle   No dose, route, or frequency recorded.      NovoFine 30G X 8 MM misc  Generic drug: Insulin Pen Needle   As directed 4 times daily      ferrous sulfate 325 (65 FE) MG tablet  Commonly known as: FeroSul  Notes to patient: 08/13/2021   325 mg, Oral, Daily With Breakfast      FreeStyle Jade 2 Reyno device   1 each, Does not apply, Continuous      FreeStyle Jade 2 Sensor misc   1 each, Does not apply, Every 14 Days      gabapentin 800 MG tablet  Commonly known as: NEURONTIN  Notes to patient: 08/12/2021   800 mg, Oral, 3 Times Daily      glucose blood test strip   Use as instructed      insulin detemir 100 UNIT/ML injection  Commonly known as: LEVEMIR  Notes to patient: 08/12/2021   45 Units, Subcutaneous, Every 12 Hours Scheduled      ipratropium-albuterol 0.5-2.5 mg/3 ml nebulizer  Commonly known as: DUO-NEB  Notes to patient:  08/12/2021   3 mL, Nebulization, 4 Times Daily - RT      isosorbide mononitrate 30 MG 24 hr tablet  Commonly known as: IMDUR  Notes to patient: 08/13/2021   30 mg, Oral, Daily      lidocaine 5 %  Commonly known as: LIDODERM  Notes to patient: As directed   PLACE 1 PATCH ON THE SKIN AS DIRECTED BY PROVIDER DAILY. REMOVE & DISCARD PATCH WITHIN 12 HOURS OR AS DIRECTED BY MD      lisinopril 10 MG tablet  Commonly known as: PRINIVILZESTRIL  Notes to patient: 08/13/2021   10 mg, Oral, Daily      montelukast 10 MG tablet  Commonly known as: SINGULAIR  Notes to patient: 08/12/2021   10 mg, Oral, Nightly      nitroglycerin 0.4 MG SL tablet  Commonly known as: NITROSTAT  Notes to patient: As needed   0.4 mg, Sublingual, Every 5 Minutes PRN      NovoLOG FlexPen 100 UNIT/ML solution pen-injector sc pen  Generic drug: insulin aspart  Notes to patient: 08/12/2021   20 Units, Subcutaneous, 3 Times Daily With Meals      nystatin 983313 UNIT/GM powder  Commonly known as: MYCOSTATIN  Notes to patient: 08/12/2021   Topical, 3 Times Daily      ondansetron ODT 4 MG disintegrating tablet  Commonly known as: Zofran ODT  Notes to patient: As needed   4 mg, Translingual, Every 8 Hours PRN      oxybutynin XL 5 MG 24 hr tablet  Commonly known as: DITROPAN-XL  Notes to patient: 08/13/2021   5 mg, Oral, Daily      pantoprazole 40 MG EC tablet  Commonly known as: PROTONIX  Notes to patient: 08/12/2021   40 mg, Oral, Nightly      sodium bicarbonate 650 MG tablet  Notes to patient: 08/12/2021   650 mg, Oral, 2 Times Daily         Stop These Medications    furosemide 20 MG tablet  Commonly known as: LASIX            Discharge Diet:   Diet Instructions     Diet: Consistent Carbohydrate, Cardiac, Renal, Specialty Diet; Thin Liquids, No Restrictions; Low Sodium      Discharge Diet:  Consistent Carbohydrate  Cardiac  Renal  Specialty Diet       Fluid Consistency: Thin Liquids, No Restrictions    Specialty Diets: Low Sodium    Fluid Restriction per  day: 1500 mL Fluid          Activity at Discharge:   Activity Instructions     Activity as Tolerated            Discharge Care Plan/Instructions: As above    Follow-up Appointment:  Additional Instructions for the Follow-ups that You Need to Schedule     Ambulatory Referral to Home Health   As directed      Face to Face Visit Date: 8/10/2021    Follow-up provider for Plan of Care?: I treated the patient in an acute care facility and will not continue treatment after discharge.    Follow-up provider: MAITE QUINN [[id number]]    Reason/Clinical Findings: DECREASED MOBILITY    Describe mobility limitations that make leaving home difficult: DECREASED MOBILITY    Nursing/Therapeutic Services Requested: Skilled Nursing Physical Therapy Occupational Therapy    Skilled nursing orders: Cardiopulmonary assessments Telehealth    PT orders: Total joint pathway Therapeutic exercise Strengthening Home safety assessment    Occupational orders: Activities of daily living Energy conservation Strengthening Home safety assessment    Frequency: 1 Week 1            Contact information for follow-up providers     Ace Lauren MD Follow up on 8/18/2021.    Specialty: Nephrology  Why: Wednesday at 12:15 pm  Contact information:  [address] [facility] [address] [phone]             Maite Quinn MD Follow up in 1 week(s).    Specialty: Family Medicine  Contact information:  [address]   [facility] [address] [phone]                   Contact information for after-discharge care     Home Medical Care     [facility] .    Service: Home Health Services  Contact information:  [address] [facility] [address] [phone]                               This document has been electronically signed by BRIDGETT Ansari on August 12, 2021 18:35 CDT        Time: Greater than 30 minutes.                Electronically signed by Alvarez Escalona MD at 08/12/21 1914

## 2021-08-13 NOTE — TELEPHONE ENCOUNTER
Incoming Refill Request      Medication requested (name and dose):gabapentin (NEURONTIN) 800 MG tablet    Pharmacy where request should be sent: [facility] - [address] - [phone] [facility] [phone] FX  Additional details provided by patient: PATIENT HAS ADVISED SHE NEEDS THIS REFILL BECAUSE SHE WAS RELEASED FROM THE HOSPITAL AND THE REFILL WAS NOT SENT.  SHE SAW DR WILMAR Yoder call back number: [phone]    Does the patient have less than a 3 day supply:  [x] Yes  [] No    Mone Meier  08/13/21, 14:40 CDT

## 2021-08-16 ENCOUNTER — TRANSITIONAL CARE MANAGEMENT TELEPHONE ENCOUNTER (OUTPATIENT)
Dept: CALL CENTER | Facility: HOSPITAL | Age: 61
End: 2021-08-16

## 2021-08-16 ENCOUNTER — OFFICE VISIT (OUTPATIENT)
Dept: CARDIOLOGY | Facility: CLINIC | Age: 61
End: 2021-08-16

## 2021-08-16 VITALS
HEIGHT: 66 IN | BODY MASS INDEX: 45.87 KG/M2 | DIASTOLIC BLOOD PRESSURE: 96 MMHG | WEIGHT: 285.4 LBS | OXYGEN SATURATION: 95 % | TEMPERATURE: 97.3 F | SYSTOLIC BLOOD PRESSURE: 168 MMHG | HEART RATE: 80 BPM

## 2021-08-16 DIAGNOSIS — F17.200 TOBACCO DEPENDENCE SYNDROME: ICD-10-CM

## 2021-08-16 DIAGNOSIS — E66.01 MORBID OBESITY (HCC): Chronic | ICD-10-CM

## 2021-08-16 DIAGNOSIS — I65.23 BILATERAL CAROTID ARTERY STENOSIS: ICD-10-CM

## 2021-08-16 DIAGNOSIS — Z95.1 S/P CABG X 3: Primary | ICD-10-CM

## 2021-08-16 DIAGNOSIS — I73.9 PAD (PERIPHERAL ARTERY DISEASE) (HCC): ICD-10-CM

## 2021-08-16 DIAGNOSIS — F17.200 CURRENT SMOKER: ICD-10-CM

## 2021-08-16 PROCEDURE — 99215 OFFICE O/P EST HI 40 MIN: CPT | Performed by: INTERNAL MEDICINE

## 2021-08-16 RX ORDER — SODIUM BICARBONATE 325 MG/1
325 TABLET ORAL 2 TIMES DAILY
COMMUNITY
Start: 2021-07-27 | End: 2021-08-16

## 2021-08-16 RX ORDER — FERROUS SULFATE 325(65) MG
TABLET ORAL
COMMUNITY
End: 2021-10-11

## 2021-08-16 RX ORDER — CEPHALEXIN 500 MG/1
500 CAPSULE ORAL 2 TIMES DAILY
COMMUNITY
Start: 2021-07-28 | End: 2021-10-28 | Stop reason: HOSPADM

## 2021-08-16 RX ORDER — HYDRALAZINE HYDROCHLORIDE 25 MG/1
25 TABLET, FILM COATED ORAL 2 TIMES DAILY
COMMUNITY
Start: 2021-07-28 | End: 2021-08-16

## 2021-08-16 RX ORDER — ISOSORBIDE MONONITRATE 60 MG/1
60 TABLET, EXTENDED RELEASE ORAL EVERY MORNING
Qty: 90 TABLET | Refills: 3 | Status: SHIPPED | OUTPATIENT
Start: 2021-08-16 | End: 2021-11-15 | Stop reason: HOSPADM

## 2021-08-16 NOTE — PROGRESS NOTES
Family Medicine Residency  Jung Leonard MD    Subjective:     Yamileth Slater is a 61 y.o. female with concurrent medical history of HTN, hyperlipidemia, CAD, COPD,CKD, Type 2 DM, anemia who presents for hospital follow up. Patient was admitted from 7/30/21 to 8/12/2021 for cellulitis of left leg which has significantly improved the infection. Patient was started on IV antibiotics and later transitioned to oral. Patient required IV albumin, continued Bumex 1 mg bid and nephrology follow up.     Since the discharge from the hospital, patient has been feeling well. Patient reports 'she just got out of UTI illness'. Patient complains of urinary dysuria, that started in the hospital. Now she is having incontinence with urine, some dysuria, suprapubic pain. Patient does have history of frequent UTIs. She has tried Keflex for UTI in the past. Patient feels like it is getting worse, now has fowl smell to urine, no hematuria. Have had kidney stones but her current symptoms are different than prior. Patient is on home oxygen 3 L for COPD which has remained stable. Quit smoking.     Patient denies chest pain, palpitations, dizziness, nausea, vomiting, headache, fevers/chills.     The following portions of the patient's history were reviewed and updated as appropriate: allergies, current medications, past family history, past medical history, past social history, past surgical history and problem list.    Past Medical Hx:  Past Medical History:   Diagnosis Date   • Anxiety    • Carotid artery stenosis    • Chronic obstructive lung disease (CMS/HCC)    • CKD (chronic kidney disease) stage 4, GFR 15-29 ml/min (CMS/HCC)    • Colonic polyp    • Coronary arteriosclerosis    • Diabetes mellitus (CMS/HCC)    • Diabetic neuropathy (CMS/HCC)    • GERD (gastroesophageal reflux disease)    • History of transfusion    • Hypercholesterolemia    • Hypertension    • Hypomagnesemia 6/27/2021    Monitor and replace   • Morbid obesity  (CMS/formerly [facility])    • Nephrolithiasis    • Peripheral vascular disease (CMS/formerly [facility])    • Sleep apnea    • Substance abuse (CMS/formerly [facility])    • Vitamin D deficiency        Past Surgical Hx:  Past Surgical History:   Procedure Laterality Date   • CARDIAC CATHETERIZATION N/A 7/14/2020   • CARDIAC CATHETERIZATION N/A 4/23/2021    Procedure: Left Heart Cath;  Surgeon: Melba Romo MD;  Location: [facility] CATH INVASIVE LOCATION;  Service: Cardiology;  Laterality: N/A;   • CARDIAC CATHETERIZATION N/A 4/30/2021    Procedure: Percutaneous Coronary Intervention;  Surgeon: Russell Voss MD;  Location: [facility] CATH INVASIVE LOCATION;  Service: Cardiovascular;  Laterality: N/A;   • CARDIAC CATHETERIZATION N/A 4/30/2021    Procedure: Stent NIKKI coronary;  Surgeon: Russell Voss MD;  Location: [facility] CATH INVASIVE LOCATION;  Service: Cardiovascular;  Laterality: N/A;   • CAROTID STENT Left    • COLONOSCOPY     • COLONOSCOPY N/A 5/14/2021    Procedure: COLONOSCOPY;  Surgeon: Mingo Duarte MD;  Location: [facility] ENDOSCOPY;  Service: Gastroenterology;  Laterality: N/A;   • CORONARY ARTERY BYPASS GRAFT N/A 2013    CABG X 3   • CYSTOSCOPY BLADDER STONE LITHOTRIPSY Bilateral    • ENDOSCOPY N/A 4/12/2021    Procedure: ESOPHAGOGASTRODUODENOSCOPY;  Surgeon: Mingo Duarte MD;  Location: [facility] ENDOSCOPY;  Service: Gastroenterology;  Laterality: N/A;   • ENDOSCOPY N/A 5/14/2021    Procedure: ESOPHAGOGASTRODUODENOSCOPY;  Surgeon: Mingo Duarte MD;  Location: [facility] ENDOSCOPY;  Service: Gastroenterology;  Laterality: N/A;       Current Meds:    Current Outpatient Medications:   •  albuterol sulfate  (90 Base) MCG/ACT inhaler, Inhale 2 puffs Every 4 (Four) Hours As Needed for Wheezing., Disp: 18 g, Rfl: 1  •  aspirin (aspirin) 81 MG EC tablet, Take 1 tablet by mouth Daily., Disp: 60 tablet, Rfl: 5  •  atorvastatin (LIPITOR) 40 MG tablet, Take 1 tablet by mouth Daily. (Patient taking differently: Take 40 mg by mouth  Every Night.), Disp: 90 tablet, Rfl: 0  •  Blood Glucose Monitoring Suppl ([employer] Blood Glucose Meter) w/Device kit, 1 each 3 (Three) Times a Day., Disp: 1 kit, Rfl: 3  •  bumetanide (BUMEX) 1 MG tablet, Take 1 tablet by mouth 2 (Two) Times a Day., Disp: 60 tablet, Rfl: 3  •  cephalexin (KEFLEX) 500 MG capsule, Take 500 mg by mouth 2 (Two) Times a Day., Disp: , Rfl:   •  cetirizine (zyrTEC) 10 MG tablet, Take 1 tablet by mouth Daily., Disp: 30 tablet, Rfl: 3  •  clopidogrel (PLAVIX) 75 MG tablet, Take 1 tablet by mouth Daily., Disp: 30 tablet, Rfl: 5  •  Continuous Blood Gluc  (FreeStyle Jade 2 Theodore) device, 1 each Continuous., Disp: 1 each, Rfl: 0  •  Continuous Blood Gluc Sensor (FreeStyle Jade 2 Sensor) misc, 1 each Every 14 (Fourteen) Days., Disp: 2 each, Rfl: 11  •  cyclobenzaprine (FLEXERIL) 10 MG tablet, Take 1 tablet by mouth 2 (Two) Times a Day As Needed for Muscle Spasms., Disp: 60 tablet, Rfl: 5  •  DULoxetine (CYMBALTA) 20 MG capsule, TAKE 1 CAPSULE BY MOUTH DAILY., Disp: 30 capsule, Rfl: 2  •  EASY TOUCH PEN NEEDLES 31G X 8 MM misc, , Disp: , Rfl:   •  ferrous sulfate (FeroSul) 325 (65 FE) MG tablet, Take 1 tablet by mouth Daily With Breakfast., Disp: 30 tablet, Rfl: 3  •  ferrous sulfate 325 (65 FE) MG tablet, Iron (ferrous sulfate) 325 mg (65 mg iron) tablet  Take 1 tablet every day by oral route., Disp: , Rfl:   •  gabapentin (NEURONTIN) 800 MG tablet, Take 1 tablet by mouth 3 (Three) Times a Day for 5 days., Disp: 90 tablet, Rfl: 0  •  glucose blood test strip, Use as instructed, Disp: 100 each, Rfl: 12  •  insulin aspart (novoLOG FLEXPEN) 100 UNIT/ML solution pen-injector sc pen, Inject 20 Units under the skin into the appropriate area as directed 3 (Three) Times a Day With Meals., Disp: 15 mL, Rfl: 1  •  Insulin Pen Needle (NovoFine) 30G X 8 MM misc, As directed 4 times daily, Disp: 100 each, Rfl: 11  •  ipratropium-albuterol (DUO-NEB) 0.5-2.5 mg/3 ml nebulizer, Take 3 mL by nebulization  4 (Four) Times a Day., Disp: , Rfl:   •  isosorbide mononitrate (IMDUR) 60 MG 24 hr tablet, Take 1 tablet by mouth Every Morning., Disp: 90 tablet, Rfl: 3  •  lidocaine (LIDODERM) 5 %, PLACE 1 PATCH ON THE SKIN AS DIRECTED BY PROVIDER DAILY. REMOVE & DISCARD PATCH WITHIN 12 HOURS OR AS DIRECTED BY MD, Disp: 30 patch, Rfl: 3  •  lisinopril (PRINIVIL,ZESTRIL) 10 MG tablet, Take 1 tablet by mouth Daily., Disp: 30 tablet, Rfl: 5  •  metoprolol tartrate (LOPRESSOR) 50 MG tablet, Take 50 mg by mouth Every 12 (Twelve) Hours., Disp: , Rfl:   •  montelukast (SINGULAIR) 10 MG tablet, Take 1 tablet by mouth Every Night., Disp: 30 tablet, Rfl: 5  •  nitroglycerin (NITROSTAT) 0.4 MG SL tablet, Place 0.4 mg under the tongue Every 5 (Five) Minutes As Needed for Chest Pain (x 3 doses)., Disp: , Rfl:   •  nystatin (MYCOSTATIN) 607830 UNIT/GM powder, Apply  topically to the appropriate area as directed 3 (Three) Times a Day., Disp: 15 g, Rfl: 1  •  ondansetron ODT (Zofran ODT) 4 MG disintegrating tablet, Place 1 tablet on the tongue Every 8 (Eight) Hours As Needed for Nausea or Vomiting., Disp: 30 tablet, Rfl: 0  •  oxybutynin XL (DITROPAN-XL) 5 MG 24 hr tablet, Take 1 tablet by mouth Daily., Disp: 90 tablet, Rfl: 1  •  pantoprazole (PROTONIX) 40 MG EC tablet, Take 1 tablet by mouth Every Night., Disp: 30 tablet, Rfl: 5  •  promethazine (PHENERGAN) 25 MG tablet, Take 25 mg by mouth Every 6 (Six) Hours As Needed., Disp: , Rfl:   •  sodium bicarbonate 650 MG tablet, Take 1 tablet by mouth 2 (Two) Times a Day., Disp: 60 tablet, Rfl: 1  •  fosfomycin (MONUROL) 3 g pack, Take 1 g by mouth 1 (One) Time for 1 dose., Disp: 1 g, Rfl: 0  •  insulin detemir (LEVEMIR) 100 UNIT/ML injection, Inject 45 Units under the skin into the appropriate area as directed Every 12 (Twelve) Hours for 30 days., Disp: 27 mL, Rfl: 11    Allergies:  Allergies   Allergen Reactions   • Adhesive Tape Hives   • Other      Bandaids, MRSA, SURECLOSE       Family  "Hx:  Family History   Problem Relation Age of Onset   • Heart disease Mother    • Heart disease Father    • Heart disease Sister    • Heart disease Brother         Social History:  Social History     Socioeconomic History   • Marital status:      Spouse name: wesley dumont   • Number of children: Not on file   • Years of education: Not on file   • Highest education level: Not on file   Tobacco Use   • Smoking status: Former Smoker     Packs/day: 0.25     Years: 46.00     Pack years: 11.50     Types: Cigarettes   • Smokeless tobacco: Never Used   • Tobacco comment: only smoking 5 a day    Substance and Sexual Activity   • Alcohol use: No   • Drug use: Not Currently     Types: LSD, Marijuana, Methamphetamines   • Sexual activity: Not Currently       Review of Systems  Review of Systems   Constitutional: Negative for activity change, appetite change, chills, diaphoresis, fatigue, fever and unexpected weight change.   Respiratory: Negative for cough, chest tightness, shortness of breath and wheezing.    Cardiovascular: Negative for chest pain, palpitations and leg swelling.   Gastrointestinal: Positive for abdominal pain (suprapubic ). Negative for diarrhea, nausea and vomiting.   Genitourinary: Positive for difficulty urinating, dysuria, frequency and urgency. Negative for flank pain.   Musculoskeletal: Negative for arthralgias and joint swelling.   Skin: Positive for wound (left leg cellulitis improved, has scar tissue present).   Neurological: Negative for dizziness, weakness, light-headedness and headaches.   Psychiatric/Behavioral: Negative for behavioral problems, confusion, decreased concentration and sleep disturbance.       Objective:     /84   Pulse 62   Ht 167.6 cm (66\")   Wt 129 kg (285 lb)   LMP  (LMP Unknown)   SpO2 96%   BMI 46.00 kg/m²   Physical Exam  Vitals and nursing note reviewed.   Constitutional:       General: She is not in acute distress.     Appearance: Normal appearance. She " is obese. She is not ill-appearing, toxic-appearing or diaphoretic.      Comments: Has oxygen on    HENT:      Head: Normocephalic and atraumatic.   Cardiovascular:      Rate and Rhythm: Normal rate and regular rhythm.      Pulses: Normal pulses.      Heart sounds: Normal heart sounds.   Pulmonary:      Effort: Pulmonary effort is normal.      Breath sounds: Normal breath sounds.   Abdominal:      General: Bowel sounds are normal. There is no distension.      Palpations: Abdomen is soft.      Tenderness: There is abdominal tenderness (suprapubic region ). There is no right CVA tenderness, left CVA tenderness or guarding.   Musculoskeletal:      Right lower leg: No edema.      Left lower leg: No edema.   Skin:     General: Skin is warm and dry.      Capillary Refill: Capillary refill takes less than 2 seconds.   Neurological:      General: No focal deficit present.      Mental Status: She is alert and oriented to person, place, and time.   Psychiatric:         Mood and Affect: Mood normal.         Behavior: Behavior normal.          Assessment/Plan:     Yamileth Slater is a 61 y.o. female with concurrent medical history of HTN, hyperlipidemia, CAD, COPD,CKD, Type 2 DM, anemia who presents for hospital follow up. Patient had significant improvement in her cellulitis. Currently patient is having urinary symptoms and will treat her with one dose of Fosfomycin. Will order urine culture and will make adjustment to antibiotic coverage based on the cultures and sensitivity. Reviewed JULIAN [id number], and it is appropriate. Patient takes gabapentin for restless leg syndrome. Refilled the medication today. Encouraged patient to follow up with nephrology for treatment of CKD.     Diagnoses and all orders for this visit:    1. Hospital discharge follow-up (Primary)    2. Uncontrolled type 2 diabetes mellitus with diabetic polyneuropathy, with long-term current use of insulin (CMS/[facility])    3. Urinary tract infection without  hematuria, site unspecified  -     Cancel: Urine Culture - Urine, Urine, Clean Catch; Future  -     Urine Culture - Urine, Urine, Clean Catch  -     fosfomycin (MONUROL) 3 g pack; Take 1 g by mouth 1 (One) Time for 1 dose.  Dispense: 1 g; Refill: 0  -     Urine Culture - Urine, Urine, Clean Catch    4. Restless leg syndrome  -     gabapentin (NEURONTIN) 800 MG tablet; Take 1 tablet by mouth 3 (Three) Times a Day for 5 days.  Dispense: 90 tablet; Refill: 0    Other orders  -     Cancel: Urinalysis With Culture If Indicated -        · Rx changes: one dose of Foscomycin   · Patient Education: good oral hydration   · Compliance at present is estimated to be good.   · Efforts to improve compliance (if necessary) will be directed at increased exercise.    Depression screening: Up to date; last screen 3/26/2021     Follow-up:     Return in about 1 month (around 9/17/2021) for Recheck.    Preventative:  Health Maintenance   Topic Date Due   • COVID-19 Vaccine (1) Never done   • ZOSTER VACCINE (1 of 2) Never done   • ANNUAL WELLNESS VISIT  Never done   • URINE MICROALBUMIN  10/05/2018   • DIABETIC EYE EXAM  10/09/2019   • DIABETIC FOOT EXAM  12/26/2019   • PAP SMEAR  01/04/2020   • INFLUENZA VACCINE  10/01/2021   • HEMOGLOBIN A1C  10/24/2021   • MAMMOGRAM  01/10/2022   • LIPID PANEL  06/02/2022   • TDAP/TD VACCINES (3 - Td or Tdap) 11/10/2024   • Pneumococcal Vaccine 0-64 (2 of 2 - PPSV23) 12/10/2024   • COLORECTAL CANCER SCREENING  05/14/2026   • HEPATITIS C SCREENING  Completed       Recommended: none  Vaccine Counseling: N/A    Weight  -Class: Obese Class III extreme obesity: > or equal to 40kg/m2  -Patient's Body mass index is 46 kg/m². indicating that she is morbidly obese (BMI > 40 or > 35 with obesity - related health condition). Obesity-related health conditions include the following: obstructive sleep apnea, hypertension, coronary heart disease, diabetes mellitus, dyslipidemias, GERD, peripheral vascular disease,  idiopathic intracranial hypertension and osteoarthritis. Obesity is unchanged. BMI is is above average; no BMI management plan is appropriate. We discussed low calorie, low carb based diet program, portion control, increasing exercise and joining a fitness center or start home based exercise program..   eat more fruits and vegetables, decrease soda or juice intake, increase water intake, increase physical activity, reduce screen time, reduce portion size, cut out extra servings and reduce fast food intake    Alcohol use:  reports no history of alcohol use.  Nicotine status  reports that she has quit smoking. Her smoking use included cigarettes. She has a 11.50 pack-year smoking history. She has never used smokeless tobacco.    Goals     • Fasting Blood Glucose       Barriers: compliance with diet      • Quit smoking / using tobacco           RISK SCORE: 3        Jung Leonard MD PGY-2  [facility] Family Medicine Residency   This document has been electronically signed by Jung Leonard MD on August 17, 2021 17:28 CDT

## 2021-08-16 NOTE — OUTREACH NOTE
Call Center TCM Note      Responses   [facility] patient discharged from?  [place]   Does the patient have one of the following disease processes/diagnoses(primary or secondary)?  Other   TCM attempt successful?  No   Unsuccessful attempts  Attempt 3          Corrie Lowe LPN    8/16/2021, 15:40 EDT

## 2021-08-16 NOTE — PROGRESS NOTES
Yamileth Slater  61 y.o. female    08/16/2021  1. S/P CABG x 3    2. Bilateral carotid artery stenosis    3. Current smoker    4. PAD (peripheral artery disease) (CMS/[facility])    5. Tobacco dependence syndrome    6. Morbid obesity (CMS/[facility])        History of Present Illness  This is a 61-year-old female with past medical history of hypertension, hyperlipidemia, obesity, IDDM, CKD, CAD s/p CABG and COPD (oxygen dependent), sleep apnea on CPAP, peripheral vascular disease with history of left carotid endarterectomy, who is being seen by me for the first time.  She has seen Dr. Barragan in the past.  Her extensive past history was reviewed in detail including records from [facility].     She presented to [facility] on 7/30/2021 with complaints of worsening swelling and erythema of the left lower extremity.  Diagnosis of cellulitis was made and she received a complete course of vancomycin, Flagyl and cefepime.   She was seen by nephrology since he has CKD stage IV.  Prior to the above admission, she was admitted to [facility] on 7/21/2021 with mental status changes and noted to have hyperglycemia, anemia with a hemoglobin between 7-8 gram percent.  She was diagnosed to have SIRS.  Covid test was reportedly negative.  CT scan of the head showed no acute changes.    She has an extensive cardiac history and underwent coronary artery bypass surgery in 2013.  She was evaluated for non-ST segment elevation myocardial infarction in April 2021.  Cardiac catheterization by Dr. Romo showed severe native vessel CAD with left main disease and patent LIMA to LAD but nonfunctioning SVG to RCA and ramus intermedius.    She was referred for redo CABG to [facility], but underwent PCI to discrete lesion of the left main coronary artery, proximal circumflex coronary artery and proximal SVG to ramus intermedius as described below,  Conclusions:   1. Left main: Discrete 70% distal  stenosis.  2. LAD: Chronic total occlusion mid segment.  3. LCX: Severely calcified ostial 99% stenosis with BEATRIZ II flow  4. RCA: Chronic total occlusion mid segment  5.  Ramus: Severely calcified 80% mid vessel stenosis.  6.  SVG to ramus: 99% proximal stenosis.  Diffuse 80% distal stenosis.  7.  SVG to RCA: Occluded proximal anastomosis  8.  Successful PCI of the left main and proximal circumflex coronary artery with a 3.0 x 28 mm Xience Gloria drug-eluting stent postdilated to high pressure with a 3.5 mm NC trek balloon in the proximal segment  9.  PTCA of the native ramus with severe calcification and balloon rupture with noncompliant balloon.  Vessel too small for rotational atherectomy.  10.  Successful PCI of the proximal SVG to ramus with a 2.75 x 28 mm Xience Gloria drug-eluting stent.  PTCA of the distal anastomosis of the SVG to ramus with a 2.5 mm trek balloon.      Echocardiogram in June 2021 showed:  · The study is technically difficult for diagnosis. The quality of the study is limited due to patient body habitus, patient positioning and lung disease.  · Left ventricular systolic function is hyperdynamic (EF > 70%).  · Left atrial volume is mildly increased.  · Left ventricular diastolic function was not assessed.    Fortunately, in spite of her multiple medical issues, the patient is progressing reasonably well and denied any chest pain at this time.  She has longstanding NYHA class II dyspnea on exertion.  She has been compliant with all her medications.  Her blood pressure was noted to be elevated at 168/96 mmHg.  She however indicates that her blood pressures much better at home.  She has quit smoking.    EKG today showed sinus rhythm with heart rate of 82 bpm.  Minimal nonspecific ST-T changes.  Low voltage complexes.    Allergies   Allergen Reactions   • Adhesive Tape Hives   • Other      Bandaids, MRSA, SURECLOSE         Past Medical History:   Diagnosis Date   • Anxiety    • Carotid artery  stenosis    • Chronic obstructive lung disease (CMS/[facility])    • CKD (chronic kidney disease) stage 4, GFR 15-29 ml/min (CMS/[facility])    • Colonic polyp    • Coronary arteriosclerosis    • Diabetes mellitus (CMS/[facility])    • Diabetic neuropathy (CMS/[facility])    • GERD (gastroesophageal reflux disease)    • History of transfusion    • Hypercholesterolemia    • Hypertension    • Hypomagnesemia 6/27/2021    Monitor and replace   • Morbid obesity (CMS/[facility])    • Nephrolithiasis    • Peripheral vascular disease (CMS/[facility])    • Sleep apnea    • Substance abuse (CMS/[facility])    • Vitamin D deficiency          Past Surgical History:   Procedure Laterality Date   • CARDIAC CATHETERIZATION N/A 7/14/2020   • CARDIAC CATHETERIZATION N/A 4/23/2021    Procedure: Left Heart Cath;  Surgeon: Melba Romo MD;  Location: [facility] [place] CATH INVASIVE LOCATION;  Service: Cardiology;  Laterality: N/A;   • CARDIAC CATHETERIZATION N/A 4/30/2021    Procedure: Percutaneous Coronary Intervention;  Surgeon: Russell Voss MD;  Location: [facility] CATH INVASIVE LOCATION;  Service: Cardiovascular;  Laterality: N/A;   • CARDIAC CATHETERIZATION N/A 4/30/2021    Procedure: Stent NIKKI coronary;  Surgeon: Russell Voss MD;  Location: [facility] CATH INVASIVE LOCATION;  Service: Cardiovascular;  Laterality: N/A;   • CAROTID STENT Left    • COLONOSCOPY     • COLONOSCOPY N/A 5/14/2021    Procedure: COLONOSCOPY;  Surgeon: Mingo Duarte MD;  Location: [facility] [place] ENDOSCOPY;  Service: Gastroenterology;  Laterality: N/A;   • CORONARY ARTERY BYPASS GRAFT N/A 2013    CABG X 3   • CYSTOSCOPY BLADDER STONE LITHOTRIPSY Bilateral    • ENDOSCOPY N/A 4/12/2021    Procedure: ESOPHAGOGASTRODUODENOSCOPY;  Surgeon: Mingo Duarte MD;  Location: [facility] [place] ENDOSCOPY;  Service: Gastroenterology;  Laterality: N/A;   • ENDOSCOPY N/A 5/14/2021    Procedure: ESOPHAGOGASTRODUODENOSCOPY;  Surgeon: Mingo Duarte MD;  Location: [facility] [place] ENDOSCOPY;  Service: Gastroenterology;   Laterality: N/A;         Family History   Problem Relation Age of Onset   • Heart disease Mother    • Heart disease Father    • Heart disease Sister    • Heart disease Brother          Patient has history of tobacco use. She is currently a non-smoker. She has 11.5 pack years smoking history.  She did smoke quarter pack of cigarettes per day for 46 years.    Current Outpatient Medications   Medication Sig Dispense Refill   • albuterol sulfate  (90 Base) MCG/ACT inhaler Inhale 2 puffs Every 4 (Four) Hours As Needed for Wheezing. 18 g 1   • aspirin (aspirin) 81 MG EC tablet Take 1 tablet by mouth Daily. 60 tablet 5   • atorvastatin (LIPITOR) 40 MG tablet Take 1 tablet by mouth Daily. (Patient taking differently: Take 40 mg by mouth Every Night.) 90 tablet 0   • Blood Glucose Monitoring Suppl ([employer] Blood Glucose Meter) w/Device kit 1 each 3 (Three) Times a Day. 1 kit 3   • bumetanide (BUMEX) 1 MG tablet Take 1 tablet by mouth 2 (Two) Times a Day. 60 tablet 3   • cetirizine (zyrTEC) 10 MG tablet Take 1 tablet by mouth Daily. 30 tablet 3   • clopidogrel (PLAVIX) 75 MG tablet Take 1 tablet by mouth Daily. 30 tablet 5   • Continuous Blood Gluc  (FreeStyle Jade 2 [place]) device 1 each Continuous. 1 each 0   • Continuous Blood Gluc Sensor (FreeStyle Jade 2 Sensor) misc 1 each Every 14 (Fourteen) Days. 2 each 11   • cyclobenzaprine (FLEXERIL) 10 MG tablet Take 1 tablet by mouth 2 (Two) Times a Day As Needed for Muscle Spasms. 60 tablet 5   • DULoxetine (CYMBALTA) 20 MG capsule TAKE 1 CAPSULE BY MOUTH DAILY. 30 capsule 2   • EASY TOUCH PEN NEEDLES 31G X 8 MM misc      • ferrous sulfate (FeroSul) 325 (65 FE) MG tablet Take 1 tablet by mouth Daily With Breakfast. 30 tablet 3   • ferrous sulfate 325 (65 FE) MG tablet Iron (ferrous sulfate) 325 mg (65 mg iron) tablet   Take 1 tablet every day by oral route.     • gabapentin (NEURONTIN) 800 MG tablet Take 1 tablet by mouth 3 (Three) Times a Day for 5 days. 90 tablet  0   • glucose blood test strip Use as instructed 100 each 12   • insulin aspart (novoLOG FLEXPEN) 100 UNIT/ML solution pen-injector sc pen Inject 20 Units under the skin into the appropriate area as directed 3 (Three) Times a Day With Meals. 15 mL 1   • Insulin Pen Needle (NovoFine) 30G X 8 MM misc As directed 4 times daily 100 each 11   • ipratropium-albuterol (DUO-NEB) 0.5-2.5 mg/3 ml nebulizer Take 3 mL by nebulization 4 (Four) Times a Day.     • lidocaine (LIDODERM) 5 % PLACE 1 PATCH ON THE SKIN AS DIRECTED BY PROVIDER DAILY. REMOVE & DISCARD PATCH WITHIN 12 HOURS OR AS DIRECTED BY MD 30 patch 3   • montelukast (SINGULAIR) 10 MG tablet Take 1 tablet by mouth Every Night. 30 tablet 5   • nitroglycerin (NITROSTAT) 0.4 MG SL tablet Place 0.4 mg under the tongue Every 5 (Five) Minutes As Needed for Chest Pain (x 3 doses).     • nystatin (MYCOSTATIN) 900805 UNIT/GM powder Apply  topically to the appropriate area as directed 3 (Three) Times a Day. 15 g 1   • ondansetron ODT (Zofran ODT) 4 MG disintegrating tablet Place 1 tablet on the tongue Every 8 (Eight) Hours As Needed for Nausea or Vomiting. 30 tablet 0   • oxybutynin XL (DITROPAN-XL) 5 MG 24 hr tablet Take 1 tablet by mouth Daily. 90 tablet 1   • pantoprazole (PROTONIX) 40 MG EC tablet Take 1 tablet by mouth Every Night. 30 tablet 5   • sodium bicarbonate 650 MG tablet Take 1 tablet by mouth 2 (Two) Times a Day. 60 tablet 1   • cephalexin (KEFLEX) 500 MG capsule Take 500 mg by mouth 2 (Two) Times a Day.     • insulin detemir (LEVEMIR) 100 UNIT/ML injection Inject 45 Units under the skin into the appropriate area as directed Every 12 (Twelve) Hours for 30 days. 27 mL 11   • isosorbide mononitrate (IMDUR) 60 MG 24 hr tablet Take 1 tablet by mouth Every Morning. 90 tablet 3   • lisinopril (PRINIVIL,ZESTRIL) 10 MG tablet Take 1 tablet by mouth Daily. 30 tablet 5     No current facility-administered medications for this visit.         OBJECTIVE    /96 (BP  "Location: Left arm, Patient Position: Sitting, Cuff Size: Adult)   Pulse 80   Temp 97.3 °F (36.3 °C)   Ht 167.6 cm (66\")   Wt 129 kg (285 lb 6.4 oz)   LMP  (LMP Unknown)   SpO2 95%   BMI 46.06 kg/m²         Review of Systems     Constitutional:  Denies recent weight loss, weight gain, fever or chills     HENT:  Denies any hearing loss, epistaxis, hoarseness, or difficulty speaking.     Eyes: Wears eyeglasses or contact lenses     Respiratory:  Dyspnea with exertion, no cough, wheezing, or hemoptysis.     Cardiovascular: Negative for palpitations, chest pain    Gastrointestinal:  Denies change in bowel habits, dyspepsia, ulcer disease, hematochezia, or melena.     Endocrine: Negative for cold intolerance, heat intolerance, polydipsia, polyphagia and polyuria.     Genitourinary: Negative.      Musculoskeletal: Resolving cellulitis left leg    Neurological:  Denies any history of recurrent headaches, strokes, TIA, or seizure disorder.     Hematological: Denies any food allergies, seasonal allergies, bleeding disorders, or lymphadenopathy.     Psychiatric/Behavioral: Denies any history of depression, substance abuse, or change in cognitive function.       Physical Exam     Constitutional: Cooperative, alert and oriented, in no acute distress.  Morbidly obese with a BMI of 46    HENT:   Head: Normocephalic, normal hair patterns, no masses or tenderness.  Ears, Nose, and Throat: No gross abnormalities. No pallor or cyanosis.   Eyes: EOMS intact, PERRL, conjunctivae and lids unremarkable. Fundoscopic exam and visual fields not performed.   Neck: No palpable masses or adenopathy, no thyromegaly, no JVD, carotid pulses are full and equal bilaterally and without bruit.     Cardiovascular: Regular rhythm, S1 and S2 normal, no S3 or S4.  No murmurs, gallops, or rubs detected.     Pulmonary/Chest: Chest: normal symmetry, normal respiratory excursion, no use of accessory muscles.     Pulmonary: Normal breath sounds. No " rales or rhonchi.    Abdominal: Abdomen soft, bowel sounds normoactive, no masses, no hepatosplenomegaly, nontender, no bruit.     Musculoskeletal: No deformities, clubbing, cyanosis, erythema, or edema observed.    Neurological: No gross motor or sensory deficits noted, affect appropriate, oriented to time, person, place.     Psychiatric: She has a normal mood and affect. Her behavior is normal. Judgment and thought content normal.         Procedures      Lab Results   Component Value Date    WBC 8.60 08/12/2021    HGB 8.0 (L) 08/12/2021    HCT 26.2 (L) 08/12/2021    MCV 87.9 08/12/2021     08/12/2021     Lab Results   Component Value Date    GLUCOSE 226 (H) 08/12/2021    BUN 46 (H) 08/12/2021    CREATININE 2.14 (H) 08/12/2021    EGFRIFNONA 23 (L) 08/12/2021    BCR 21.5 08/12/2021    CO2 27.0 08/12/2021    CALCIUM 8.5 (L) 08/12/2021    ALBUMIN 2.60 (L) 08/11/2021    AST 12 08/11/2021    ALT 5 08/11/2021     Lab Results   Component Value Date    CHOL 130 06/02/2021    CHOL 168 06/24/2020    CHOL 121 04/04/2018     Lab Results   Component Value Date    TRIG 338 (H) 06/02/2021    TRIG 388 (H) 06/24/2020    TRIG 203 (H) 04/04/2018     Lab Results   Component Value Date    HDL 39 (L) 06/02/2021    HDL 43 06/24/2020    HDL 35 (L) 04/04/2018     No components found for: LDLCALC  Lab Results   Component Value Date    LDL 41 06/02/2021    LDL 47 06/24/2020    LDL 47 04/04/2018     No results found for: HDLLDLRATIO  No components found for: CHOLHDL  Lab Results   Component Value Date    HGBA1C 11.00 (H) 04/24/2021     Lab Results   Component Value Date    TSH 1.120 04/24/2021    THYROIDAB 12 09/24/2014           ASSESSMENT AND PLAN  Yamileth Slater is a 61-year-old female with multiple medical issues as discussed in detail under history of present illness with recent hospitalization both at [facility] and [facility] as described above.  Fortunately she is stable from a cardiac  standpoint and has had no recurrence of angina following PCI to multiple vessels in April 2021, as described above, at [facility].  She has been compliant with her medication and has quit smoking.  Her LDL was within normal limit in June 2021 at 41 and HDL was 39.  After reviewing her medicines I have continued antiplatelet therapy with aspirin and Plavix, lipid-lowering therapy with atorvastatin, antihypertensive therapy with lisinopril and I have increased the dose of isosorbide mononitrate to 60 mg daily for optimization of blood pressure.  We could consider adding amlodipine if required in the future.  Aggressive risk factor modification and optimization of diabetes recommended.  Her hemoglobin A1c was 11 in April 2021.    Diagnoses and all orders for this visit:    1. S/P CABG x 3 (Primary)    2. Bilateral carotid artery stenosis    3. Current smoker    4. PAD (peripheral artery disease) (CMS/[facility])    5. Tobacco dependence syndrome    6. Morbid obesity (CMS/[facility])    Other orders  -     isosorbide mononitrate (IMDUR) 60 MG 24 hr tablet; Take 1 tablet by mouth Every Morning.  Dispense: 90 tablet; Refill: 3        Patient's Body mass index is 46.06 kg/m². indicating that she is morbidly obese (BMI > 40 or > 35 with obesity - related health condition). Obesity-related health conditions include the following: obstructive sleep apnea, hypertension, coronary heart disease, diabetes mellitus, dyslipidemias and peripheral vascular disease. Obesity is unchanged. BMI is is above average; BMI management plan is completed. We discussed portion control and increasing exercise..      Yamileth Slater  reports that she has quit smoking. Her smoking use included cigarettes. She has a 11.50 pack-year smoking history. She has never used smokeless tobacco.       50 minutes was spent in the assessment and evaluation of this patient and review of old records.          Margarito Davis MD  8/16/2021  17:59  CDT

## 2021-08-17 ENCOUNTER — OFFICE VISIT (OUTPATIENT)
Dept: FAMILY MEDICINE CLINIC | Facility: CLINIC | Age: 61
End: 2021-08-17

## 2021-08-17 VITALS
HEIGHT: 66 IN | OXYGEN SATURATION: 96 % | WEIGHT: 285 LBS | HEART RATE: 62 BPM | BODY MASS INDEX: 45.8 KG/M2 | DIASTOLIC BLOOD PRESSURE: 84 MMHG | SYSTOLIC BLOOD PRESSURE: 134 MMHG

## 2021-08-17 DIAGNOSIS — IMO0002 UNCONTROLLED TYPE 2 DIABETES MELLITUS WITH DIABETIC POLYNEUROPATHY, WITH LONG-TERM CURRENT USE OF INSULIN: ICD-10-CM

## 2021-08-17 DIAGNOSIS — Z09 HOSPITAL DISCHARGE FOLLOW-UP: Primary | ICD-10-CM

## 2021-08-17 DIAGNOSIS — G25.81 RESTLESS LEG SYNDROME: ICD-10-CM

## 2021-08-17 DIAGNOSIS — N39.0 URINARY TRACT INFECTION WITHOUT HEMATURIA, SITE UNSPECIFIED: ICD-10-CM

## 2021-08-17 DIAGNOSIS — N39.0 URINARY TRACT INFECTION WITHOUT HEMATURIA, SITE UNSPECIFIED: Primary | ICD-10-CM

## 2021-08-17 LAB
BILIRUB BLD-MCNC: NEGATIVE MG/DL
CLARITY, POC: CLEAR
COLOR UR: YELLOW
GLUCOSE UR STRIP-MCNC: ABNORMAL MG/DL
KETONES UR QL: NEGATIVE
LEUKOCYTE EST, POC: NEGATIVE
NITRITE UR-MCNC: NEGATIVE MG/ML
PH UR: 7 [PH] (ref 5–8)
PROT UR STRIP-MCNC: ABNORMAL MG/DL
RBC # UR STRIP: ABNORMAL /UL
SP GR UR: 1.02 (ref 1–1.03)
UROBILINOGEN UR QL: NORMAL

## 2021-08-17 PROCEDURE — 87086 URINE CULTURE/COLONY COUNT: CPT | Performed by: STUDENT IN AN ORGANIZED HEALTH CARE EDUCATION/TRAINING PROGRAM

## 2021-08-17 PROCEDURE — 81002 URINALYSIS NONAUTO W/O SCOPE: CPT | Performed by: STUDENT IN AN ORGANIZED HEALTH CARE EDUCATION/TRAINING PROGRAM

## 2021-08-17 PROCEDURE — 99496 TRANSJ CARE MGMT HIGH F2F 7D: CPT | Performed by: STUDENT IN AN ORGANIZED HEALTH CARE EDUCATION/TRAINING PROGRAM

## 2021-08-17 PROCEDURE — 1111F DSCHRG MED/CURRENT MED MERGE: CPT | Performed by: STUDENT IN AN ORGANIZED HEALTH CARE EDUCATION/TRAINING PROGRAM

## 2021-08-17 RX ORDER — GRANULES FOR ORAL 3 G/1
1 POWDER ORAL ONCE
Qty: 1 G | Refills: 0 | Status: SHIPPED | OUTPATIENT
Start: 2021-08-17 | End: 2021-08-17

## 2021-08-17 RX ORDER — METOPROLOL TARTRATE 50 MG/1
50 TABLET, FILM COATED ORAL EVERY 12 HOURS
COMMUNITY
Start: 2021-06-09 | End: 2021-10-08

## 2021-08-17 RX ORDER — PROMETHAZINE HYDROCHLORIDE 25 MG/1
25 TABLET ORAL EVERY 6 HOURS PRN
COMMUNITY
Start: 2021-07-13 | End: 2022-02-24 | Stop reason: SDUPTHER

## 2021-08-17 RX ORDER — GABAPENTIN 800 MG/1
800 TABLET ORAL 3 TIMES DAILY
Qty: 90 TABLET | Refills: 0 | Status: SHIPPED | OUTPATIENT
Start: 2021-08-17 | End: 2021-10-01 | Stop reason: SDUPTHER

## 2021-08-18 LAB — BACTERIA SPEC AEROBE CULT: NO GROWTH

## 2021-08-23 ENCOUNTER — READMISSION MANAGEMENT (OUTPATIENT)
Dept: CALL CENTER | Facility: HOSPITAL | Age: 61
End: 2021-08-23

## 2021-08-23 NOTE — OUTREACH NOTE
Medical Week 2 Survey      Responses   [facility] patient discharged from?  [place]   Does the patient have one of the following disease processes/diagnoses(primary or secondary)?  Other   Week 2 attempt successful?  No   Unsuccessful attempts  Attempt 1          Nery Guzmán RN

## 2021-08-23 NOTE — PROGRESS NOTES
Yamileth Slater  Subjective:     Yamileth Slater is a 61 y.o. female who presents for   Chief Complaint   Patient presents with   • Hospital Follow Up Visit   • Urinary Tract Infection       61-year-old female recently admitted to the hospital with cellulitis and UTI resents today for post hospital follow-up/transition care management visit.  Please see Dr. Leonard's note for more detailed information regarding today's encounter hospital discharge summary etc. clinically she reportedly has recovered from her UTI and has completed antibiotic therapy.  Unfortunately she continues to have some urinary urgency and dysuria with urinary incontinence and suprapubic discomfort.  She continues on home oxygen therapy and is stable from the pulmonary standpoint.        Past Medical Hx:  Past Medical History:   Diagnosis Date   • Anxiety    • Carotid artery stenosis    • Chronic obstructive lung disease (CMS/HCC)    • CKD (chronic kidney disease) stage 4, GFR 15-29 ml/min (CMS/[facility])    • Colonic polyp    • Coronary arteriosclerosis    • Diabetes mellitus (CMS/HCC)    • Diabetic neuropathy (CMS/[facility])    • GERD (gastroesophageal reflux disease)    • History of transfusion    • Hypercholesterolemia    • Hypertension    • Hypomagnesemia 6/27/2021    Monitor and replace   • Morbid obesity (CMS/[facility])    • Nephrolithiasis    • Peripheral vascular disease (CMS/[facility])    • Sleep apnea    • Substance abuse (CMS/[facility])    • Vitamin D deficiency        Past Surgical Hx:  Past Surgical History:   Procedure Laterality Date   • CARDIAC CATHETERIZATION N/A 7/14/2020   • CARDIAC CATHETERIZATION N/A 4/23/2021    Procedure: Left Heart Cath;  Surgeon: Melba Romo MD;  Location: [facility] CATH INVASIVE LOCATION;  Service: Cardiology;  Laterality: N/A;   • CARDIAC CATHETERIZATION N/A 4/30/2021    Procedure: Percutaneous Coronary Intervention;  Surgeon: Russell Voss MD;  Location: [facility] CATH INVASIVE LOCATION;  Service:  Cardiovascular;  Laterality: N/A;   • CARDIAC CATHETERIZATION N/A 4/30/2021    Procedure: Stent NIKKI coronary;  Surgeon: Russell Voss MD;  Location: [facility] INVASIVE LOCATION;  Service: Cardiovascular;  Laterality: N/A;   • CAROTID STENT Left    • COLONOSCOPY     • COLONOSCOPY N/A 5/14/2021    Procedure: COLONOSCOPY;  Surgeon: Mingo Duarte MD;  Location: [facility] ENDOSCOPY;  Service: Gastroenterology;  Laterality: N/A;   • CORONARY ARTERY BYPASS GRAFT N/A 2013    CABG X 3   • CYSTOSCOPY BLADDER STONE LITHOTRIPSY Bilateral    • ENDOSCOPY N/A 4/12/2021    Procedure: ESOPHAGOGASTRODUODENOSCOPY;  Surgeon: Mingo Duarte MD;  Location: [facility] ENDOSCOPY;  Service: Gastroenterology;  Laterality: N/A;   • ENDOSCOPY N/A 5/14/2021    Procedure: ESOPHAGOGASTRODUODENOSCOPY;  Surgeon: Mingo Duarte MD;  Location: [facility] ENDOSCOPY;  Service: Gastroenterology;  Laterality: N/A;       Health Maintenance:  Health Maintenance   Topic Date Due   • COVID-19 Vaccine (1) Never done   • ZOSTER VACCINE (1 of 2) Never done   • ANNUAL WELLNESS VISIT  Never done   • URINE MICROALBUMIN  10/05/2018   • DIABETIC EYE EXAM  10/09/2019   • DIABETIC FOOT EXAM  12/26/2019   • PAP SMEAR  01/04/2020   • INFLUENZA VACCINE  10/01/2021   • MAMMOGRAM  01/10/2022   • HEMOGLOBIN A1C  02/17/2022   • LIPID PANEL  06/02/2022   • TDAP/TD VACCINES (3 - Td or Tdap) 11/10/2024   • Pneumococcal Vaccine 0-64 (2 of 2 - PPSV23) 12/10/2024   • COLORECTAL CANCER SCREENING  05/14/2026   • HEPATITIS C SCREENING  Completed       Current Meds:    Current Outpatient Medications:   •  albuterol sulfate  (90 Base) MCG/ACT inhaler, Inhale 2 puffs Every 4 (Four) Hours As Needed for Wheezing., Disp: 18 g, Rfl: 1  •  aspirin (aspirin) 81 MG EC tablet, Take 1 tablet by mouth Daily., Disp: 60 tablet, Rfl: 5  •  atorvastatin (LIPITOR) 40 MG tablet, Take 1 tablet by mouth Daily. (Patient taking differently: Take 40 mg by mouth Every Night.), Disp: 90  tablet, Rfl: 0  •  Blood Glucose Monitoring Suppl ([employer] Blood Glucose Meter) w/Device kit, 1 each 3 (Three) Times a Day., Disp: 1 kit, Rfl: 3  •  bumetanide (BUMEX) 1 MG tablet, Take 1 tablet by mouth 2 (Two) Times a Day., Disp: 60 tablet, Rfl: 3  •  cephalexin (KEFLEX) 500 MG capsule, Take 500 mg by mouth 2 (Two) Times a Day., Disp: , Rfl:   •  cetirizine (zyrTEC) 10 MG tablet, Take 1 tablet by mouth Daily., Disp: 30 tablet, Rfl: 3  •  clopidogrel (PLAVIX) 75 MG tablet, Take 1 tablet by mouth Daily., Disp: 30 tablet, Rfl: 5  •  Continuous Blood Gluc  (FreeStyle Jade 2 [place]) device, 1 each Continuous., Disp: 1 each, Rfl: 0  •  Continuous Blood Gluc Sensor (FreeStyle Jade 2 Sensor) misc, 1 each Every 14 (Fourteen) Days., Disp: 2 each, Rfl: 11  •  cyclobenzaprine (FLEXERIL) 10 MG tablet, Take 1 tablet by mouth 2 (Two) Times a Day As Needed for Muscle Spasms., Disp: 60 tablet, Rfl: 5  •  DULoxetine (CYMBALTA) 20 MG capsule, TAKE 1 CAPSULE BY MOUTH DAILY., Disp: 30 capsule, Rfl: 2  •  EASY TOUCH PEN NEEDLES 31G X 8 MM misc, , Disp: , Rfl:   •  ferrous sulfate (FeroSul) 325 (65 FE) MG tablet, Take 1 tablet by mouth Daily With Breakfast., Disp: 30 tablet, Rfl: 3  •  ferrous sulfate 325 (65 FE) MG tablet, Iron (ferrous sulfate) 325 mg (65 mg iron) tablet  Take 1 tablet every day by oral route., Disp: , Rfl:   •  gabapentin (NEURONTIN) 800 MG tablet, Take 1 tablet by mouth 3 (Three) Times a Day for 5 days., Disp: 90 tablet, Rfl: 0  •  glucose blood test strip, Use as instructed, Disp: 100 each, Rfl: 12  •  insulin aspart (novoLOG FLEXPEN) 100 UNIT/ML solution pen-injector sc pen, Inject 20 Units under the skin into the appropriate area as directed 3 (Three) Times a Day With Meals., Disp: 15 mL, Rfl: 1  •  Insulin Pen Needle (NovoFine) 30G X 8 MM misc, As directed 4 times daily, Disp: 100 each, Rfl: 11  •  ipratropium-albuterol (DUO-NEB) 0.5-2.5 mg/3 ml nebulizer, Take 3 mL by nebulization 4 (Four) Times a Day.,  Disp: , Rfl:   •  isosorbide mononitrate (IMDUR) 60 MG 24 hr tablet, Take 1 tablet by mouth Every Morning., Disp: 90 tablet, Rfl: 3  •  lidocaine (LIDODERM) 5 %, PLACE 1 PATCH ON THE SKIN AS DIRECTED BY PROVIDER DAILY. REMOVE & DISCARD PATCH WITHIN 12 HOURS OR AS DIRECTED BY MD, Disp: 30 patch, Rfl: 3  •  lisinopril (PRINIVIL,ZESTRIL) 10 MG tablet, Take 1 tablet by mouth Daily., Disp: 30 tablet, Rfl: 5  •  metoprolol tartrate (LOPRESSOR) 50 MG tablet, Take 50 mg by mouth Every 12 (Twelve) Hours., Disp: , Rfl:   •  montelukast (SINGULAIR) 10 MG tablet, Take 1 tablet by mouth Every Night., Disp: 30 tablet, Rfl: 5  •  nitroglycerin (NITROSTAT) 0.4 MG SL tablet, Place 0.4 mg under the tongue Every 5 (Five) Minutes As Needed for Chest Pain (x 3 doses)., Disp: , Rfl:   •  nystatin (MYCOSTATIN) 331417 UNIT/GM powder, Apply  topically to the appropriate area as directed 3 (Three) Times a Day., Disp: 15 g, Rfl: 1  •  ondansetron ODT (Zofran ODT) 4 MG disintegrating tablet, Place 1 tablet on the tongue Every 8 (Eight) Hours As Needed for Nausea or Vomiting., Disp: 30 tablet, Rfl: 0  •  oxybutynin XL (DITROPAN-XL) 5 MG 24 hr tablet, Take 1 tablet by mouth Daily., Disp: 90 tablet, Rfl: 1  •  pantoprazole (PROTONIX) 40 MG EC tablet, Take 1 tablet by mouth Every Night., Disp: 30 tablet, Rfl: 5  •  promethazine (PHENERGAN) 25 MG tablet, Take 25 mg by mouth Every 6 (Six) Hours As Needed., Disp: , Rfl:   •  sodium bicarbonate 650 MG tablet, Take 1 tablet by mouth 2 (Two) Times a Day., Disp: 60 tablet, Rfl: 1  •  insulin detemir (LEVEMIR) 100 UNIT/ML injection, Inject 45 Units under the skin into the appropriate area as directed Every 12 (Twelve) Hours for 30 days., Disp: 27 mL, Rfl: 11    Allergies:  Adhesive tape and Other    Family Hx:  Family History   Problem Relation Age of Onset   • Heart disease Mother    • Heart disease Father    • Heart disease Sister    • Heart disease Brother         Social History:  Social History  "    Socioeconomic History   • Marital status:      Spouse name: wesley dumont   • Number of children: Not on file   • Years of education: Not on file   • Highest education level: Not on file   Tobacco Use   • Smoking status: Former Smoker     Packs/day: 0.25     Years: 46.00     Pack years: 11.50     Types: Cigarettes   • Smokeless tobacco: Never Used   • Tobacco comment: only smoking 5 a day    Substance and Sexual Activity   • Alcohol use: No   • Drug use: Not Currently     Types: LSD, Marijuana, Methamphetamines   • Sexual activity: Not Currently       Review of Systems  Review of Systems    Objective:     /84   Pulse 62   Ht 167.6 cm (66\")   Wt 129 kg (285 lb)   LMP  (LMP Unknown)   SpO2 96%   BMI 46.00 kg/m²   Physical Exam alert no distress please see Dr. Leonard's note for more detailed information regarding physical exam findings    Lab Review  Results for orders placed or performed in visit on 08/17/21   Urine Culture - Urine, Urine, Clean Catch    Specimen: Urine, Clean Catch   Result Value Ref Range    Urine Culture No growth             Assessment:     Diagnoses and all orders for this visit:    1. Hospital discharge follow-up (Primary)    2. Uncontrolled type 2 diabetes mellitus with diabetic polyneuropathy, with long-term current use of insulin (CMS/[facility])    3. Urinary tract infection without hematuria, site unspecified  -     Cancel: Urine Culture - Urine, Urine, Clean Catch; Future  -     Urine Culture - Urine, Urine, Clean Catch  -     fosfomycin (MONUROL) 3 g pack; Take 1 g by mouth 1 (One) Time for 1 dose.  Dispense: 1 g; Refill: 0  -     Urine Culture - Urine, Urine, Clean Catch    4. Restless leg syndrome  -     gabapentin (NEURONTIN) 800 MG tablet; Take 1 tablet by mouth 3 (Three) Times a Day for 5 days.  Dispense: 90 tablet; Refill: 0    Other orders  -     Cancel: Urinalysis With Culture If Indicated -        Plan:   Will repeat urine culture and treat empirically for possible " UTI pending culture with fosfomycin close follow-up in 4 weeks otherwise recheck as needed  I have seen and examined the patient.  I have reviewed the notes, assessments, and/or procedures performed by Dr. Leonard, I concur with her/his documentation and assessment and plan for Yamileth Slater.            This document has been electronically signed by Blake Burris MD on August 23, 2021 11:36 CDT

## 2021-08-24 RX ORDER — HYDRALAZINE HYDROCHLORIDE 25 MG/1
TABLET, FILM COATED ORAL
Qty: 60 TABLET | Refills: 0 | OUTPATIENT
Start: 2021-08-24

## 2021-08-24 RX ORDER — ATORVASTATIN CALCIUM 40 MG/1
40 TABLET, FILM COATED ORAL DAILY
Qty: 30 TABLET | Refills: 0 | OUTPATIENT
Start: 2021-08-24

## 2021-08-24 RX ORDER — FUROSEMIDE 80 MG
TABLET ORAL
Qty: 30 TABLET | Refills: 0 | OUTPATIENT
Start: 2021-08-24

## 2021-08-24 NOTE — TELEPHONE ENCOUNTER
Patient is currently hospitalized at [place], and then she will go to rehab after. Spoke with patient over the phone, and agreed on making an appointment once she is discharge to continue the treatment of her current medical conditions.

## 2021-08-25 ENCOUNTER — READMISSION MANAGEMENT (OUTPATIENT)
Dept: CALL CENTER | Facility: HOSPITAL | Age: 61
End: 2021-08-25

## 2021-08-25 ENCOUNTER — DOCUMENTATION (OUTPATIENT)
Dept: FAMILY MEDICINE CLINIC | Facility: CLINIC | Age: 61
End: 2021-08-25

## 2021-08-25 NOTE — OUTREACH NOTE
Medical Week 2 Survey      Responses   [facility] patient discharged from?  [place]   Does the patient have one of the following disease processes/diagnoses(primary or secondary)?  Other   Week 2 attempt successful?  No   Unsuccessful attempts  Attempt 2          Christen Lara RN

## 2021-08-25 NOTE — PROGRESS NOTES
Liz asked to call her at [phone] because she had question regarding the patient's BIPAP. I called Liz and she let me know that the patient was discharge to [facility] [place] after being hospitalized at [place]. Liz asked me about Mrs. Slater sleep apnea type, why is she in trilogy and no BiPAP, and if they can use BIPAP at the nursing home and what will be the settings. I told her I would call her back after reviewing Mrs. Slater chart since I have not yet seen Mrs Slater at the office, and I also needed to speak with my preceptor about the patient.  After speaking with Dr. Burris, I called back Liz but spoke with her  supervisor and explained to her that unfortunately, we not manage patiens at [facility] and that she needs to call Verona Reynaga at sleep medicine to address those questions. She told me that she already spoke with Verona who told her to contact the PCP (me). I explained that the patient was evaluated by sleep medicine and that they should be addressing those questions. She then mentioned that the attending physician (Dr. Birmingham) would like to speak with my attending (my preceptor, Dr. Burris). I advised her to call our office and leave a message with our  with this request.

## 2021-08-31 ENCOUNTER — READMISSION MANAGEMENT (OUTPATIENT)
Dept: CALL CENTER | Facility: HOSPITAL | Age: 61
End: 2021-08-31

## 2021-08-31 NOTE — OUTREACH NOTE
Medical Week 3 Survey      Responses   [facility] [place] patient discharged from?  [place]   Does the patient have one of the following disease processes/diagnoses(primary or secondary)?  Other   Week 3 attempt successful?  No   Unsuccessful attempts  Attempt 1          Pauline Pascual RN

## 2021-09-10 ENCOUNTER — TRANSCRIBE ORDERS (OUTPATIENT)
Dept: HOME HEALTH SERVICES | Facility: HOME HEALTHCARE | Age: 61
End: 2021-09-10

## 2021-09-10 ENCOUNTER — HOME HEALTH ADMISSION (OUTPATIENT)
Dept: HOME HEALTH SERVICES | Facility: HOME HEALTHCARE | Age: 61
End: 2021-09-10

## 2021-09-10 DIAGNOSIS — J96.21 ACUTE AND CHRONIC RESPIRATORY FAILURE WITH HYPOXIA (HCC): Primary | ICD-10-CM

## 2021-09-11 ENCOUNTER — READMISSION MANAGEMENT (OUTPATIENT)
Dept: CALL CENTER | Facility: HOSPITAL | Age: 61
End: 2021-09-11

## 2021-09-12 NOTE — OUTREACH NOTE
Prep Survey      Responses   [facility] facility patient discharged from?  Non-BH   Is LACE score < 7 ?  Non-BH Discharge   Emergency Room discharge w/ pulse ox?  No   Eligibility  [facility]   Date of Admission  08/27/21   Date of Discharge  09/11/21   Discharge diagnosis  Cellulitis L/leg   Does the patient have one of the following disease processes/diagnoses(primary or secondary)?  Non-BH Discharge   Prep survey completed?  Yes          Kateryna Hurtado RN

## 2021-09-13 ENCOUNTER — TRANSITIONAL CARE MANAGEMENT TELEPHONE ENCOUNTER (OUTPATIENT)
Dept: CALL CENTER | Facility: HOSPITAL | Age: 61
End: 2021-09-13

## 2021-09-13 ENCOUNTER — HOME CARE VISIT (OUTPATIENT)
Dept: HOME HEALTH SERVICES | Facility: CLINIC | Age: 61
End: 2021-09-13

## 2021-09-13 VITALS
HEART RATE: 94 BPM | DIASTOLIC BLOOD PRESSURE: 88 MMHG | OXYGEN SATURATION: 98 % | SYSTOLIC BLOOD PRESSURE: 154 MMHG | RESPIRATION RATE: 18 BRPM | TEMPERATURE: 97.8 F

## 2021-09-13 PROCEDURE — G0152 HHCP-SERV OF OT,EA 15 MIN: HCPCS

## 2021-09-13 PROCEDURE — G0151 HHCP-SERV OF PT,EA 15 MIN: HCPCS

## 2021-09-13 NOTE — HOME HEALTH
PATIENT WAS AT [facility] SECONDARY TO CELLULITIS IN LE. PATIENT WAS SENT HOME AND THEN SHE WAS READMITTED TO [facility] SECONDARY TO CELLULITIS IN LEFT LE. PATIENT WAS DISCHARGED HOME AND THEN HAD A FALL IN THE BATHROOM SECONDARY TO PASSING OUT. SHE THEN WAS LIFE FLIGHTED OUT TO [facility] AND SHE WAS ADMITTED THERE SECONDARY TO CKD. SHE WAS THEN SENT TO REHAB FOR 2 WEEKS AND WAS DISCHARGED HOME ON SATURDAY 9/11/2021    PMHX: CHF, CKD, COPD, MI    PATIENT GOAL: GET STRONGER AND GET HERSELF BETTER    PRIOR LEVEL OF FUNCTION: PATIENT WAS INDEPENDENT WITH ALL ACTIVITY BUT DID USE WALKER WHEN OUTSIDE AND DID USE OXYGEN AS NEEDED    PATIENT HAS NEUROPATHY PRESENT IN FEET AND HANDS

## 2021-09-13 NOTE — HOME HEALTH
"REASON FOR REFERRAL:   62 y/o female with 4 recent hosptializations at [facility] 6/27/21-7/1/21 for ERIKA and COPD.  [facility]enn[facility] Eduin and discharged on 7/28/21 with AMS and cellulitis.  [facility] 7/30/21-8/12/21 with cellultis L LE, anemia, ERIKA.   Patient was positive for CRE 8/13/21. She then came home from [facility] then fell and hyperglycemia.  She was admitted to [place] then discharged to SNF ([facility]) for approximately 2 weeks per patient's report.  Patient reported that she is feeling much better and would like to continue to work on regaining her strength/endurance.    PMH:  DM, CKD, HTN, HLD, PVD, GERD, anxiety, CAD, COPD-O2 dependent, chronic respiratory failure with hypoxia, MIKE, morbid obesity, neuropathy, CABG    PLOF:  Independent with functional transdfers/functional mobility inside her home using RW PRN.  She had PRN assistance from her spouse for bathing/getting in/out in shower.  Her and her spouse shared the cooking, laundry.    HOME ENVIRONMENT:  Patient lives in one level apartment without steps to enter with her spouse.      PRECAUTIONS:  Monitor O2 with activity    MD APPTS: 9/15/21 Dr Lauren, 2/15/21 Dr Davis    DME:  RW, oxgyreji equipment, trilogy, shower chair in tub/shower combo with curtain, grab bars in shower/tub, rollator walker.    AROM B UES:  WFL    STRENGTH B UES: 4/5 grossly throughout.    HAND DOMINANCE:  L hand dominant, B  strength: 4/5.     REHAB POTENTIAL:  [facility] for Goals    WISH TO ADDRESS BATH/DRESSING WITH OT: NO    PATIENT'S GOAL:  \"GET STRONGER\""

## 2021-09-13 NOTE — OUTREACH NOTE
Call Center TCM Note      Responses   [facility], [place] patient discharged from?  Non-BH   Does the patient have one of the following disease processes/diagnoses(primary or secondary)?  Non- Discharge   TCM attempt successful?  No   Unsuccessful attempts  Attempt 2          Noah Green RN    9/13/2021, 14:54 EDT

## 2021-09-13 NOTE — OUTREACH NOTE
Call Center TCM Note      Responses   [facility] patient discharged from?  Non-BH   Does the patient have one of the following disease processes/diagnoses(primary or secondary)?  Non-BH Discharge   TCM attempt successful?  No   Unsuccessful attempts  Attempt 1          Noah Green RN    9/13/2021, 14:48 EDT

## 2021-09-14 ENCOUNTER — TRANSITIONAL CARE MANAGEMENT TELEPHONE ENCOUNTER (OUTPATIENT)
Dept: CALL CENTER | Facility: HOSPITAL | Age: 61
End: 2021-09-14

## 2021-09-14 NOTE — OUTREACH NOTE
Call Center TCM Note      Responses   [facility] patient discharged from?  Non-BH   Does the patient have one of the following disease processes/diagnoses(primary or secondary)?  Non-BH Discharge   TCM attempt successful?  No [ verbal release]   Unsuccessful attempts  Attempt 3          Roma Lopez RN    2021, 11:52 EDT

## 2021-09-15 VITALS
OXYGEN SATURATION: 98 % | DIASTOLIC BLOOD PRESSURE: 68 MMHG | RESPIRATION RATE: 18 BRPM | HEART RATE: 72 BPM | TEMPERATURE: 96.9 F | SYSTOLIC BLOOD PRESSURE: 122 MMHG

## 2021-09-16 ENCOUNTER — HOME CARE VISIT (OUTPATIENT)
Dept: HOME HEALTH SERVICES | Facility: CLINIC | Age: 61
End: 2021-09-16

## 2021-09-16 VITALS
SYSTOLIC BLOOD PRESSURE: 96 MMHG | RESPIRATION RATE: 18 BRPM | DIASTOLIC BLOOD PRESSURE: 52 MMHG | TEMPERATURE: 97.4 F | OXYGEN SATURATION: 98 % | HEART RATE: 74 BPM

## 2021-09-16 PROCEDURE — G0158 HHC OT ASSISTANT EA 15: HCPCS

## 2021-09-17 ENCOUNTER — TELEMEDICINE (OUTPATIENT)
Dept: FAMILY MEDICINE CLINIC | Facility: CLINIC | Age: 61
End: 2021-09-17

## 2021-09-17 DIAGNOSIS — G25.81 RESTLESS LEG SYNDROME: ICD-10-CM

## 2021-09-17 DIAGNOSIS — Z09 HOSPITAL DISCHARGE FOLLOW-UP: Primary | ICD-10-CM

## 2021-09-17 PROCEDURE — 99441 PR PHYS/QHP TELEPHONE EVALUATION 5-10 MIN: CPT | Performed by: STUDENT IN AN ORGANIZED HEALTH CARE EDUCATION/TRAINING PROGRAM

## 2021-09-17 RX ORDER — GABAPENTIN 800 MG/1
800 TABLET ORAL 3 TIMES DAILY
Qty: 90 TABLET | Refills: 0 | Status: CANCELLED | OUTPATIENT
Start: 2021-09-17 | End: 2021-09-22

## 2021-09-17 NOTE — PROGRESS NOTES
"  Family Medicine Residency  Alvarado Mauricio MD    Subjective:     Yamileth Slater is a 61 y.o. female who presents for nursing home discharge visit.  This is a telephone visit.    You have chosen to receive care through a telephone visit. Do you consent to use a telephone visit for your medical care today? Yes    Approximately 10 minutes were spent on this telephone encounter.    The patient tells me that she was discharged from the nursing home on Saturday.  She was says he was there to strengthen her legs because of a history of frequent falls.  She says today \"I feel better and can walk better.  I have been walking outside.\"  Occupational and physical therapy have been working with her and have continue to work with her since her discharge.    She also saw the nephrologist.  She tells me that he recommended she begin to receive hemodialysis.  She is thinking this over and will see him again for 6 week appointment.    She has no concerns today.      The following portions of the patient's history were reviewed and updated as appropriate: allergies, current medications, past family history, past medical history, past social history, past surgical history and problem list.    Past Medical Hx:  Past Medical History:   Diagnosis Date   • Anxiety    • Carotid artery stenosis    • Chronic obstructive lung disease (CMS/HCC)    • CKD (chronic kidney disease) stage 4, GFR 15-29 ml/min (CMS/HCC)    • Colonic polyp    • Coronary arteriosclerosis    • Diabetes mellitus (CMS/HCC)    • Diabetic neuropathy (CMS/HCC)    • GERD (gastroesophageal reflux disease)    • History of transfusion    • Hypercholesterolemia    • Hypertension    • Hypomagnesemia 6/27/2021    Monitor and replace   • Morbid obesity (CMS/HCC)    • Nephrolithiasis    • Peripheral vascular disease (CMS/HCC)    • Sleep apnea    • Substance abuse (CMS/HCC)    • Vitamin D deficiency        Past Surgical Hx:  Past Surgical History:   Procedure Laterality Date "   • CARDIAC CATHETERIZATION N/A 7/14/2020   • CARDIAC CATHETERIZATION N/A 4/23/2021    Procedure: Left Heart Cath;  Surgeon: Melba Romo MD;  Location: [facility] CATH INVASIVE LOCATION;  Service: Cardiology;  Laterality: N/A;   • CARDIAC CATHETERIZATION N/A 4/30/2021    Procedure: Percutaneous Coronary Intervention;  Surgeon: Russell Voss MD;  Location: [facility] CATH INVASIVE LOCATION;  Service: Cardiovascular;  Laterality: N/A;   • CARDIAC CATHETERIZATION N/A 4/30/2021    Procedure: Stent NIKKI coronary;  Surgeon: Russell Voss MD;  Location: [facility] CATH INVASIVE LOCATION;  Service: Cardiovascular;  Laterality: N/A;   • CAROTID STENT Left    • COLONOSCOPY     • COLONOSCOPY N/A 5/14/2021    Procedure: COLONOSCOPY;  Surgeon: Mingo Duarte MD;  Location: [facility] ENDOSCOPY;  Service: Gastroenterology;  Laterality: N/A;   • CORONARY ARTERY BYPASS GRAFT N/A 2013    CABG X 3   • CYSTOSCOPY BLADDER STONE LITHOTRIPSY Bilateral    • ENDOSCOPY N/A 4/12/2021    Procedure: ESOPHAGOGASTRODUODENOSCOPY;  Surgeon: Mingo Duarte MD;  Location: [facility] ENDOSCOPY;  Service: Gastroenterology;  Laterality: N/A;   • ENDOSCOPY N/A 5/14/2021    Procedure: ESOPHAGOGASTRODUODENOSCOPY;  Surgeon: Mingo Duarte MD;  Location: [facility] ENDOSCOPY;  Service: Gastroenterology;  Laterality: N/A;       Current Meds:    Current Outpatient Medications:   •  albuterol sulfate  (90 Base) MCG/ACT inhaler, Inhale 2 puffs Every 4 (Four) Hours As Needed for Wheezing., Disp: 18 g, Rfl: 1  •  aspirin (aspirin) 81 MG EC tablet, Take 1 tablet by mouth Daily., Disp: 60 tablet, Rfl: 5  •  atorvastatin (LIPITOR) 40 MG tablet, Take 1 tablet by mouth Daily. (Patient taking differently: Take 20 mg by mouth Every Night.), Disp: 90 tablet, Rfl: 0  •  Blood Glucose Monitoring Suppl ([employer] Blood Glucose Meter) w/Device kit, 1 each 3 (Three) Times a Day., Disp: 1 kit, Rfl: 3  •  bumetanide (BUMEX) 1 MG tablet, Take 1 tablet by mouth 2  (Two) Times a Day., Disp: 60 tablet, Rfl: 3  •  cephalexin (KEFLEX) 500 MG capsule, Take 500 mg by mouth 2 (Two) Times a Day., Disp: , Rfl:   •  cetirizine (zyrTEC) 10 MG tablet, Take 1 tablet by mouth Daily., Disp: 30 tablet, Rfl: 3  •  clopidogrel (PLAVIX) 75 MG tablet, Take 1 tablet by mouth Daily., Disp: 30 tablet, Rfl: 5  •  Continuous Blood Gluc  (FreeStyle Jade 2 Braham) device, 1 each Continuous., Disp: 1 each, Rfl: 0  •  Continuous Blood Gluc Sensor (FreeStyle Jade 2 Sensor) misc, 1 each Every 14 (Fourteen) Days., Disp: 2 each, Rfl: 11  •  cyclobenzaprine (FLEXERIL) 10 MG tablet, Take 1 tablet by mouth 2 (Two) Times a Day As Needed for Muscle Spasms., Disp: 60 tablet, Rfl: 5  •  DULoxetine (CYMBALTA) 20 MG capsule, TAKE 1 CAPSULE BY MOUTH DAILY., Disp: 30 capsule, Rfl: 2  •  EASY TOUCH PEN NEEDLES 31G X 8 MM misc, , Disp: , Rfl:   •  ferrous sulfate (FeroSul) 325 (65 FE) MG tablet, Take 1 tablet by mouth Daily With Breakfast., Disp: 30 tablet, Rfl: 3  •  ferrous sulfate 325 (65 FE) MG tablet, Iron (ferrous sulfate) 325 mg (65 mg iron) tablet  Take 1 tablet every day by oral route., Disp: , Rfl:   •  glucose blood test strip, Use as instructed, Disp: 100 each, Rfl: 12  •  insulin aspart (novoLOG FLEXPEN) 100 UNIT/ML solution pen-injector sc pen, Inject 20 Units under the skin into the appropriate area as directed 3 (Three) Times a Day With Meals., Disp: 15 mL, Rfl: 1  •  Insulin Glargine (Semglee) 100 UNIT/ML injection pen, Inject 50 Units under the skin into the appropriate area as directed 2 (Two) Times a Day., Disp: , Rfl:   •  Insulin Pen Needle (NovoFine) 30G X 8 MM misc, As directed 4 times daily, Disp: 100 each, Rfl: 11  •  ipratropium-albuterol (DUO-NEB) 0.5-2.5 mg/3 ml nebulizer, Take 3 mL by nebulization 4 (Four) Times a Day., Disp: , Rfl:   •  isosorbide mononitrate (IMDUR) 60 MG 24 hr tablet, Take 1 tablet by mouth Every Morning. (Patient taking differently: Take 0.5 tablets by mouth  Every Morning. TAKING 30 MG ), Disp: 90 tablet, Rfl: 3  •  levothyroxine (SYNTHROID, LEVOTHROID) 25 MCG tablet, Take 25 mcg by mouth Daily., Disp: , Rfl:   •  lidocaine (LIDODERM) 5 %, PLACE 1 PATCH ON THE SKIN AS DIRECTED BY PROVIDER DAILY. REMOVE & DISCARD PATCH WITHIN 12 HOURS OR AS DIRECTED BY MD, Disp: 30 patch, Rfl: 3  •  lisinopril (PRINIVIL,ZESTRIL) 10 MG tablet, Take 1 tablet by mouth Daily., Disp: 30 tablet, Rfl: 5  •  metoprolol tartrate (LOPRESSOR) 50 MG tablet, Take 50 mg by mouth Every 12 (Twelve) Hours., Disp: , Rfl:   •  montelukast (SINGULAIR) 10 MG tablet, Take 1 tablet by mouth Every Night., Disp: 30 tablet, Rfl: 5  •  nitroglycerin (NITROSTAT) 0.4 MG SL tablet, Place 0.4 mg under the tongue Every 5 (Five) Minutes As Needed for Chest Pain (x 3 doses)., Disp: , Rfl:   •  nystatin (MYCOSTATIN) 848410 UNIT/GM powder, Apply  topically to the appropriate area as directed 3 (Three) Times a Day., Disp: 15 g, Rfl: 1  •  ondansetron ODT (Zofran ODT) 4 MG disintegrating tablet, Place 1 tablet on the tongue Every 8 (Eight) Hours As Needed for Nausea or Vomiting., Disp: 30 tablet, Rfl: 0  •  oxybutynin XL (DITROPAN-XL) 5 MG 24 hr tablet, Take 1 tablet by mouth Daily., Disp: 90 tablet, Rfl: 1  •  pantoprazole (PROTONIX) 40 MG EC tablet, Take 1 tablet by mouth Every Night., Disp: 30 tablet, Rfl: 5  •  promethazine (PHENERGAN) 25 MG tablet, Take 25 mg by mouth Every 6 (Six) Hours As Needed., Disp: , Rfl:   •  sodium bicarbonate 650 MG tablet, Take 1 tablet by mouth 2 (Two) Times a Day. (Patient taking differently: Take 2 tablets by mouth 2 (Two) Times a Day.), Disp: 60 tablet, Rfl: 1  •  gabapentin (NEURONTIN) 800 MG tablet, Take 1 tablet by mouth 3 (Three) Times a Day for 5 days., Disp: 90 tablet, Rfl: 0  •  insulin detemir (LEVEMIR) 100 UNIT/ML injection, Inject 45 Units under the skin into the appropriate area as directed Every 12 (Twelve) Hours for 30 days., Disp: 27 mL, Rfl: 11    Allergies:  Allergies  "  Allergen Reactions   • Adhesive Tape Hives   • Other      Bandaids, MRSA, SURECLOSE       Family Hx:  Family History   Problem Relation Age of Onset   • Heart disease Mother    • Heart disease Father    • Heart disease Sister    • Heart disease Brother         Social History:  Social History     Socioeconomic History   • Marital status:      Spouse name: wesley dumont   • Number of children: Not on file   • Years of education: Not on file   • Highest education level: Not on file   Tobacco Use   • Smoking status: Former Smoker     Packs/day: 0.25     Years: 46.00     Pack years: 11.50     Types: Cigarettes   • Smokeless tobacco: Never Used   • Tobacco comment: only smoking 5 a day    Substance and Sexual Activity   • Alcohol use: No   • Drug use: Not Currently     Types: LSD, Marijuana, Methamphetamines   • Sexual activity: Not Currently       Review of Systems  Review of Systems   Constitutional: Negative for chills and fever.   Respiratory: Positive for cough. Negative for shortness of breath and wheezing.    Cardiovascular: Positive for leg swelling.   Gastrointestinal: Negative for nausea and vomiting.   Neurological: Positive for headaches. Negative for weakness and light-headedness.       Objective:     LMP  (LMP Unknown)   Physical Exam this was a telephone visit and no physical exam could be performed.     Assessment/Plan:     Diagnoses and all orders for this visit:    1.  Nursing home discharge follow-up    The patient tells me that she was discharged from the nursing home on Saturday.  She was says he was there to strengthen her legs because of a history of frequent falls.  She says today \"I feel better and can walk better.  I have been walking outside.\"  Occupational and physical therapy have been working with her and have continue to work with her since her discharge.    2.  Care gaps  Patient is indicated to receive an annual wellness visit.  I will schedule her to follow-up with her PCP for " this visit in 7 weeks, because this will be after her appointment to determine whether or not she will do dialysis with nephrology.    · Rx changes: None  · Patient Education: Counseled to get Covid vaccine  C   Follow-up:     Return in about 7 weeks (around 11/5/2021) for Annual Wellness (IN PERSON with PCP).    Preventative:  Health Maintenance   Topic Date Due   • COVID-19 Vaccine (1) Never done   • ZOSTER VACCINE (1 of 2) Never done   • ANNUAL WELLNESS VISIT  Never done   • URINE MICROALBUMIN  10/05/2018   • DIABETIC EYE EXAM  10/09/2019   • DIABETIC FOOT EXAM  12/26/2019   • PAP SMEAR  01/04/2020   • INFLUENZA VACCINE  10/01/2021   • MAMMOGRAM  01/10/2022   • HEMOGLOBIN A1C  02/17/2022   • LIPID PANEL  06/02/2022   • TDAP/TD VACCINES (3 - Td or Tdap) 11/10/2024   • Pneumococcal Vaccine 0-64 (2 of 2 - PPSV23) 12/10/2024   • COLORECTAL CANCER SCREENING  05/14/2026   • HEPATITIS C SCREENING  Completed         Alcohol use:  reports no history of alcohol use.  Nicotine status  reports that she has quit smoking. Her smoking use included cigarettes. She has a 11.50 pack-year smoking history. She has never used smokeless tobacco.    Goals     • Fasting Blood Glucose       Barriers: compliance with diet      • Quit smoking / using tobacco           RISK SCORE: 5      This document has been electronically signed by Alvarado Mauricio MD on September 17, 2021 11:22 CDT

## 2021-09-17 NOTE — PROGRESS NOTES
I have spoken with the patient .   I have reviewed the notes, assessments, and/or procedures performed by Dr. Alvarado Mauricio, I concur with his  documentation and assessment and plan for Yamileth Slater.          This document has been electronically signed by Helder Lee MD on September 17, 2021 13:49 CDT

## 2021-09-20 ENCOUNTER — HOME CARE VISIT (OUTPATIENT)
Dept: HOME HEALTH SERVICES | Facility: CLINIC | Age: 61
End: 2021-09-20

## 2021-09-20 VITALS
RESPIRATION RATE: 18 BRPM | DIASTOLIC BLOOD PRESSURE: 90 MMHG | HEART RATE: 73 BPM | OXYGEN SATURATION: 98 % | SYSTOLIC BLOOD PRESSURE: 130 MMHG | TEMPERATURE: 97.8 F

## 2021-09-20 PROCEDURE — G0158 HHC OT ASSISTANT EA 15: HCPCS

## 2021-09-21 ENCOUNTER — HOME CARE VISIT (OUTPATIENT)
Dept: HOME HEALTH SERVICES | Facility: CLINIC | Age: 61
End: 2021-09-21

## 2021-09-21 PROCEDURE — G0157 HHC PT ASSISTANT EA 15: HCPCS

## 2021-09-21 PROCEDURE — G0180 MD CERTIFICATION HHA PATIENT: HCPCS | Performed by: FAMILY MEDICINE

## 2021-09-22 VITALS
DIASTOLIC BLOOD PRESSURE: 64 MMHG | TEMPERATURE: 97.2 F | RESPIRATION RATE: 18 BRPM | SYSTOLIC BLOOD PRESSURE: 138 MMHG | OXYGEN SATURATION: 99 % | HEART RATE: 64 BPM

## 2021-09-24 DIAGNOSIS — G25.81 RESTLESS LEG SYNDROME: ICD-10-CM

## 2021-09-24 NOTE — TELEPHONE ENCOUNTER
Incoming Refill Request      Medication requested (name and dose):gabapentin (NEURONTIN) 800 MG tablet ()    Pharmacy where request should be sent: [facility] - [address] [phone] [facility] [phone] FX    Additional details provided by patient: patient states that she has been calling for three days for this medication.     Best call back number: [phone]    Does the patient have less than a 3 day supply:  [x] Yes  [] No    Ferdinand Espinosa Rep  21, 12:37 CDT

## 2021-09-27 RX ORDER — GABAPENTIN 800 MG/1
800 TABLET ORAL 3 TIMES DAILY
Qty: 90 TABLET | Refills: 0 | OUTPATIENT
Start: 2021-09-27 | End: 2021-10-02

## 2021-09-27 NOTE — TELEPHONE ENCOUNTER
Tried called the patient regarding her refill request for gabapentin 800mg TID because after checking JULIAN (#[id number]), I wanted to ask the patient about the recent (9/10/21) refill by Komal Jha RN of gabapentin 400mg TID. No answer, and voice mail was not set up.

## 2021-09-29 ENCOUNTER — HOME CARE VISIT (OUTPATIENT)
Dept: HOME HEALTH SERVICES | Facility: CLINIC | Age: 61
End: 2021-09-29

## 2021-09-29 ENCOUNTER — TELEPHONE (OUTPATIENT)
Dept: FAMILY MEDICINE CLINIC | Facility: CLINIC | Age: 61
End: 2021-09-29

## 2021-09-29 PROCEDURE — G0151 HHCP-SERV OF PT,EA 15 MIN: HCPCS

## 2021-09-29 NOTE — TELEPHONE ENCOUNTER
Incoming Refill Request      Medication requested (name and dose): gabapentin (NEURONTIN) 800 MG tablet()     Pharmacy where request should be sent: [facility]    Additional details provided by patient:     Best call back number: [phone]    Does the patient have less than a 3 day supply:  [x] Yes  [] No    Elaina Griffin Workman  21, 10:30 CDT

## 2021-10-01 ENCOUNTER — LAB (OUTPATIENT)
Dept: LAB | Facility: HOSPITAL | Age: 61
End: 2021-10-01

## 2021-10-01 ENCOUNTER — OFFICE VISIT (OUTPATIENT)
Dept: FAMILY MEDICINE CLINIC | Facility: CLINIC | Age: 61
End: 2021-10-01

## 2021-10-01 VITALS
BODY MASS INDEX: 46 KG/M2 | SYSTOLIC BLOOD PRESSURE: 126 MMHG | DIASTOLIC BLOOD PRESSURE: 82 MMHG | HEART RATE: 62 BPM | OXYGEN SATURATION: 99 % | HEIGHT: 66 IN

## 2021-10-01 DIAGNOSIS — E11.43 TYPE 2 DIABETES MELLITUS WITH DIABETIC AUTONOMIC NEUROPATHY, WITH LONG-TERM CURRENT USE OF INSULIN (HCC): Primary | ICD-10-CM

## 2021-10-01 DIAGNOSIS — L30.4 INTERTRIGO: ICD-10-CM

## 2021-10-01 DIAGNOSIS — T42.6X5A ADVERSE EFFECT OF GABAPENTIN, INITIAL ENCOUNTER: ICD-10-CM

## 2021-10-01 DIAGNOSIS — Z79.4 TYPE 2 DIABETES MELLITUS WITH DIABETIC AUTONOMIC NEUROPATHY, WITH LONG-TERM CURRENT USE OF INSULIN (HCC): Primary | ICD-10-CM

## 2021-10-01 DIAGNOSIS — G89.4 CHRONIC PAIN SYNDROME: ICD-10-CM

## 2021-10-01 PROCEDURE — 99213 OFFICE O/P EST LOW 20 MIN: CPT | Performed by: STUDENT IN AN ORGANIZED HEALTH CARE EDUCATION/TRAINING PROGRAM

## 2021-10-01 PROCEDURE — G0480 DRUG TEST DEF 1-7 CLASSES: HCPCS | Performed by: STUDENT IN AN ORGANIZED HEALTH CARE EDUCATION/TRAINING PROGRAM

## 2021-10-01 RX ORDER — NITROGLYCERIN 0.4 MG/1
0.4 TABLET SUBLINGUAL
Status: CANCELLED | OUTPATIENT
Start: 2021-10-01

## 2021-10-01 RX ORDER — GABAPENTIN 800 MG/1
800 TABLET ORAL 3 TIMES DAILY
Qty: 90 TABLET | Refills: 1 | Status: SHIPPED | OUTPATIENT
Start: 2021-10-01 | End: 2021-10-28 | Stop reason: HOSPADM

## 2021-10-01 RX ORDER — NYSTATIN 100000 [USP'U]/G
POWDER TOPICAL 3 TIMES DAILY
Qty: 15 G | Refills: 1 | Status: SHIPPED | OUTPATIENT
Start: 2021-10-01 | End: 2022-04-15

## 2021-10-01 NOTE — PROGRESS NOTES
Family Medicine Residency  Carline Coffey MD    Subjective:     Yamileth Slater is a 61 y.o. female who presents for medication refill of gabapentin and nystatin.  Patient takes gabapentin for her neuropathy.  Patient's last hemoglobin A1c is 10.  She is on basal and prandial insulin.  She needed a refill of the nystatin for intertrigo beneath the breast. Also wanted us to evaluate a mobile area that she noted on her left upper breast.    The following portions of the patient's history were reviewed and updated as appropriate: allergies, current medications, past family history, past medical history, past social history, past surgical history and problem list.    Past Medical Hx:  Past Medical History:   Diagnosis Date   • Acute blood loss anemia 4/16/2017    Likely due to gastric oozing at this time. - Dr. Duarte (GI) was consulted and has now signed off, will follow up outpatient - pill colonoscopy showed AVMs - continue to monitor   • Anxiety    • Carotid artery stenosis    • Chronic obstructive lung disease (HCC)    • CKD (chronic kidney disease) stage 4, GFR 15-29 ml/min (Formerly [facility])    • Colonic polyp    • Coronary arteriosclerosis    • Diabetes mellitus (HCC)    • Diabetic neuropathy (HCC)    • Ear pain, right 10/18/2021    - canal trauma due to patient scratching and DMT2 - added cortisporin ear drops   • Elevated troponin 10/12/2021    -most likely from CKD -Trending down -Neg chest pain   • GERD (gastroesophageal reflux disease)    • GI bleed 5/13/2021    - GI will follow up outpatient - Protonix 40mg daily - Avoid medical DVT prophy and use mechanical at this time instead. - Continue to monitor - pill colonoscopy results showed AVMs   • History of transfusion    • Hypercholesterolemia    • Hypertension    • Hypomagnesemia 6/27/2021    Monitor and replace   • Morbid obesity (Formerly [facility])    • Nephrolithiasis    • Peripheral vascular disease (HCC)    • Sleep apnea    • Substance abuse (Formerly [facility])    • Vitamin D  deficiency        Past Surgical Hx:  Past Surgical History:   Procedure Laterality Date   • CARDIAC CATHETERIZATION N/A 7/14/2020   • CARDIAC CATHETERIZATION N/A 4/23/2021    Procedure: Left Heart Cath;  Surgeon: Melba Romo MD;  Location: [facility] CATH INVASIVE LOCATION;  Service: Cardiology;  Laterality: N/A;   • CARDIAC CATHETERIZATION N/A 4/30/2021    Procedure: Percutaneous Coronary Intervention;  Surgeon: Russell Voss MD;  Location: [facility] CATH INVASIVE LOCATION;  Service: Cardiovascular;  Laterality: N/A;   • CARDIAC CATHETERIZATION N/A 4/30/2021    Procedure: Stent NIKKI coronary;  Surgeon: Russell Voss MD;  Location: [facility] CATH INVASIVE LOCATION;  Service: Cardiovascular;  Laterality: N/A;   • CAROTID STENT Left    • COLONOSCOPY     • COLONOSCOPY N/A 5/14/2021    Procedure: COLONOSCOPY;  Surgeon: Mingo Duarte MD;  Location: [facility] ENDOSCOPY;  Service: Gastroenterology;  Laterality: N/A;   • CORONARY ARTERY BYPASS GRAFT N/A 2013    CABG X 3   • CYSTOSCOPY BLADDER STONE LITHOTRIPSY Bilateral    • ENDOSCOPY N/A 4/12/2021    Procedure: ESOPHAGOGASTRODUODENOSCOPY;  Surgeon: Mingo Duarte MD;  Location: [facility] ENDOSCOPY;  Service: Gastroenterology;  Laterality: N/A;   • ENDOSCOPY N/A 5/14/2021    Procedure: ESOPHAGOGASTRODUODENOSCOPY;  Surgeon: Mingo Duarte MD;  Location: [facility] ENDOSCOPY;  Service: Gastroenterology;  Laterality: N/A;   • INTERVENTIONAL RADIOLOGY PROCEDURE N/A 10/21/2021    Procedure: tunneled central venous catheter placement;  Surgeon: Donnie Robles MD;  Location: [facility] ANGIO INVASIVE LOCATION;  Service: Interventional Radiology;  Laterality: N/A;       Current Meds:  No current facility-administered medications for this visit.  No current outpatient medications on file.    Facility-Administered Medications Ordered in Other Visits:   •  acetaminophen (TYLENOL) tablet 650 mg, 650 mg, Oral, Q8H PRN, Carline Coffey MD, 650 mg at 11/10/21  2214  •  albumin human 25 % IV SOLN 12.5 g, 12.5 g, Intravenous, PRN, Ace Lauren MD  •  albuterol (PROVENTIL) nebulizer solution 0.083% 2.5 mg/3mL, 2.5 mg, Nebulization, Q4H PRN, Carline Coffey MD  •  aspirin EC tablet 81 mg, 81 mg, Oral, Daily, Carline Coffey MD, 81 mg at 11/12/21 0830  •  atorvastatin (LIPITOR) tablet 20 mg, 20 mg, Oral, Daily, Carline Coffey MD, 20 mg at 11/12/21 0831  •  bumetanide (BUMEX) tablet 2 mg, 2 mg, Oral, Once per day on Sun Mon Wed Fri, Carline Coffey MD, 2 mg at 11/12/21 0830  •  cetirizine (zyrTEC) tablet 10 mg, 10 mg, Oral, Daily, Carline Coffey MD, 10 mg at 11/12/21 0830  •  clopidogrel (PLAVIX) tablet 75 mg, 75 mg, Oral, Daily, Carline Coffey MD, 75 mg at 11/12/21 0830  •  cyclobenzaprine (FLEXERIL) tablet 10 mg, 10 mg, Oral, BID PRN, Carline Coffey MD  •  dextrose (D50W) (25 g/50 mL) IV injection 25 g, 25 g, Intravenous, Q15 Min PRN, Carline Coffey MD  •  dextrose (D50W) (25 g/50 mL) IV injection 25-50 mL, 25-50 mL, Intravenous, Q30 Min PRN, Amor Rivera MD  •  dextrose (GLUTOSE) oral gel 15 g, 15 g, Oral, Q15 Min PRN, Carline Coffey MD  •  DULoxetine (CYMBALTA) DR capsule 20 mg, 20 mg, Oral, Daily, Carline Coffey MD, 20 mg at 11/12/21 0830  •  ferrous sulfate EC tablet 324 mg, 324 mg, Oral, Daily With Breakfast, Carline Coffey MD, 324 mg at 11/12/21 0831  •  fluticasone (FLONASE) 50 MCG/ACT nasal spray 2 spray, 2 spray, Each Nare, Daily, Carline Coffey MD, 2 spray at 11/11/21 0802  •  gabapentin (NEURONTIN) capsule 400 mg, 400 mg, Oral, TID, Carline Coffey MD, 400 mg at 11/12/21 2123  •  glucagon (human recombinant) (GLUCAGEN DIAGNOSTIC) injection 1 mg, 1 mg, Subcutaneous, Q15 Min PRN, Carline Coffey MD  •  heparin (porcine) 5000 UNIT/ML injection 5,000 Units, 5,000 Units, Subcutaneous, Q12H, Carline Coffey MD, 5,000 Units at 11/12/21 2124  •  heparin (porcine) injection 2,000 Units, 2,000 Units, Intracatheter, PRN,  Carline Coffey MD, 2,000 Units at 11/13/21 1757  •  heparin (porcine) injection 2,000 Units, 2,000 Units, Intracatheter, PRN, Carline Coffey MD, 2,000 Units at 11/13/21 1755  •  influenza vac split quad (FLUZONE,FLUARIX,AFLURIA,FLULAVAL) injection 0.5 mL, 0.5 mL, Intramuscular, During Hospitalization, Carline Coffey MD  •  insulin aspart (novoLOG) injection 8-40 Units, 8-40 Units, Subcutaneous, TID With Meals, Carline Coffey MD  •  insulin regular (HumuLIN R,NovoLIN R) 100 Units in sodium chloride 0.9 % 100 mL (1 Units/mL) infusion, 0-50 Units/hr, Intravenous, Titrated, Amor Rivera MD, Last Rate: 2.8 mL/hr at 11/13/21 1809, 2.8 Units/hr at 11/13/21 1809  •  ipratropium-albuterol (DUO-NEB) nebulizer solution 3 mL, 3 mL, Nebulization, 4x Daily - RT, Carline Coffey MD, 3 mL at 11/13/21 1927  •  isosorbide mononitrate (IMDUR) 24 hr tablet 30 mg, 30 mg, Oral, QAM, Carline Coffey MD, 30 mg at 11/12/21 0623  •  levothyroxine (SYNTHROID, LEVOTHROID) tablet 25 mcg, 25 mcg, Oral, Daily, Carline Coffey MD, 25 mcg at 11/12/21 0831  •  lidocaine (LIDODERM) 5 % 1 patch, 1 patch, Transdermal, Q24H, Carline Coffey MD, 1 patch at 11/12/21 0831  •  lisinopril (PRINIVIL,ZESTRIL) tablet 10 mg, 10 mg, Oral, Daily, Carline Coffey MD, 10 mg at 11/12/21 0830  •  melatonin tablet 1.5 mg, 1.5 mg, Oral, Nightly PRN, Carline Coffey MD  •  metoprolol tartrate (LOPRESSOR) tablet 50 mg, 50 mg, Oral, Q12H, Carline Coffey MD, 50 mg at 11/12/21 2124  •  montelukast (SINGULAIR) tablet 10 mg, 10 mg, Oral, Nightly, Carline Coffey MD, 10 mg at 11/12/21 2124  •  morphine injection 1 mg, 1 mg, Intravenous, Q4H PRN, 1 mg at 11/12/21 2124 **AND** naloxone (NARCAN) injection 0.4 mg, 0.4 mg, Intravenous, Q5 Min PRN, Carline Coffey MD  •  nitroglycerin (NITROSTAT) SL tablet 0.4 mg, 0.4 mg, Sublingual, Q5 Min PRN, Carline Coffey MD  •  nystatin (MYCOSTATIN) powder, , Topical, TID, Carline Coffey MD,  Given at 11/12/21 1557  •  ondansetron (ZOFRAN) injection 4 mg, 4 mg, Intravenous, Q6H PRN, Carline Coffey MD  •  ondansetron ODT (ZOFRAN-ODT) disintegrating tablet 4 mg, 4 mg, Oral, Q8H PRN, Carline Coffey MD  •  oxybutynin XL (DITROPAN-XL) 24 hr tablet 5 mg, 5 mg, Oral, Daily, Carline Coffey MD, 5 mg at 11/12/21 0830  •  pantoprazole (PROTONIX) EC tablet 40 mg, 40 mg, Oral, Nightly, Carline Coffey MD, 40 mg at 11/12/21 2123  •  promethazine (PHENERGAN) tablet 25 mg, 25 mg, Oral, Q6H PRN, Carline Coffey MD  •  ranolazine (RANEXA) 12 hr tablet 500 mg, 500 mg, Oral, Q12H, Carline Coffey MD, 500 mg at 11/12/21 2123  •  sennosides-docusate (PERICOLACE) 8.6-50 MG per tablet 2 tablet, 2 tablet, Oral, BID, Carline Coffey MD, 2 tablet at 11/12/21 0830  •  sodium chloride 0.9 % flush 10 mL, 10 mL, Intravenous, PRN, Carline Coffey MD  •  sodium chloride 0.9 % flush 10 mL, 10 mL, Intravenous, Q12H, Carline Coffey MD, 10 mL at 11/12/21 2127  •  sodium chloride 0.9 % flush 10 mL, 10 mL, Intravenous, PRN, Carline Coffey MD  •  sodium chloride 0.9 % flush 30 mL, 30 mL, Intravenous, Once PRN, Amor Rivera MD  •  sodium chloride 0.9 % infusion, 75 mL/hr, Intravenous, Continuous, Carline Coffey MD, Stopped at 11/13/21 1756  •  sodium chloride 0.9 % infusion, 30 mL/hr, Intravenous, Continuous PRN, Amor Rivera MD, Last Rate: 30 mL/hr at 11/13/21 1807, 30 mL/hr at 11/13/21 1807    Allergies:  Allergies   Allergen Reactions   • Adhesive Tape Hives   • Other      Bandaids, MRSA, SURECLOSE       Family Hx:  Family History   Problem Relation Age of Onset   • Heart disease Mother    • Heart disease Father    • Heart disease Sister    • Heart disease Brother         Social History:  Social History     Socioeconomic History   • Marital status:      Spouse name: wesley dumont   Tobacco Use   • Smoking status: Former Smoker     Packs/day: 0.25     Years: 46.00     Pack years:  "11.50     Types: Cigarettes   • Smokeless tobacco: Never Used   • Tobacco comment: only smoking 5 a day - quit april 23 2021   Substance and Sexual Activity   • Alcohol use: No   • Drug use: Not Currently     Types: LSD, Marijuana, Methamphetamines   • Sexual activity: Not Currently       Review of Systems  Review of Systems   Constitutional: Negative for chills and fever.   HENT: Negative for facial swelling and trouble swallowing.    Eyes: Negative for photophobia and visual disturbance.   Respiratory: Negative for apnea, chest tightness and shortness of breath.    Cardiovascular: Negative for chest pain.   Gastrointestinal: Negative for abdominal distention, diarrhea, nausea and vomiting.   Genitourinary: Negative for pelvic pain.   Musculoskeletal: Negative for arthralgias and myalgias.   Neurological: Negative for seizures and speech difficulty.   Psychiatric/Behavioral: Negative for confusion and hallucinations.       Objective:     /82   Pulse 62   Ht 167.6 cm (66\")   LMP  (LMP Unknown)   SpO2 99% Comment: 3L o2%  BMI 46.00 kg/m²   Physical Exam  Exam conducted with a chaperone present.   Constitutional:       Appearance: She is morbidly obese.   HENT:      Head: Normocephalic and atraumatic.      Nose: Nose normal.   Eyes:      Conjunctiva/sclera: Conjunctivae normal.      Pupils: Pupils are equal, round, and reactive to light.   Cardiovascular:      Rate and Rhythm: Normal rate and regular rhythm.      Pulses: Normal pulses.      Heart sounds: Normal heart sounds.   Pulmonary:      Effort: Pulmonary effort is normal. No respiratory distress.      Breath sounds: Normal breath sounds.   Chest:   Breasts:      Left: Mass present. No swelling, bleeding, skin change or tenderness.        Comments: ~3.5 cm, Mobile breast mass   Abdominal:      General: Abdomen is flat. Bowel sounds are normal. There is no distension.      Palpations: Abdomen is soft.   Musculoskeletal:         General: Normal range of " motion.      Cervical back: Normal range of motion and neck supple.   Skin:     General: Skin is dry.   Neurological:      General: No focal deficit present.      Mental Status: She is alert and oriented to person, place, and time. Mental status is at baseline.      Cranial Nerves: No cranial nerve deficit.   Psychiatric:         Mood and Affect: Mood normal.         Behavior: Behavior normal.          Assessment/Plan:     Diagnoses and all orders for this visit:    1. Type 2 diabetes mellitus with diabetic autonomic neuropathy, with long-term current use of insulin (HCC) (Primary)  -     Discontinue: gabapentin (NEURONTIN) 800 MG tablet; Take 1 tablet by mouth 3 (Three) Times a Day for 5 days.  Dispense: 90 tablet; Refill: 1    2. Intertrigo  -     nystatin (MYCOSTATIN) 222154 UNIT/GM powder; Apply  topically to the appropriate area as directed 3 (Three) Times a Day.  Dispense: 15 g; Refill: 1    3. Adverse effect of gabapentin, initial encounter  -     Gabapentin, Urine -    4. Chronic pain syndrome   -     Gabapentin, Urine -      -We will refill gabapentin and nystatin  -Complete urine gabapentin test.  Previous ones have been normal  -She was previously supposed to have a breast mammogram which never got completed because she was hospitalized.  Consider getting a screening breast mammogram at next visit  -Follow-up in 1 month     Follow-up:     Return in about 4 weeks (around 10/29/2021) for Next scheduled follow up.    Preventative:  Health Maintenance   Topic Date Due   • COVID-19 Vaccine (1) Never done   • ZOSTER VACCINE (1 of 2) Never done   • ANNUAL WELLNESS VISIT  Never done   • DIABETIC EYE EXAM  10/09/2019   • DIABETIC FOOT EXAM  12/26/2019   • PAP SMEAR  01/04/2020   • Pneumococcal Vaccine 0-64 (3 of 4 - PPSV23) 02/04/2020   • INFLUENZA VACCINE  08/01/2021   • MAMMOGRAM  01/10/2022   • HEMOGLOBIN A1C  04/26/2022   • LIPID PANEL  06/02/2022   • URINE MICROALBUMIN  10/19/2022   • TDAP/TD VACCINES (3 - Td  or Tdap) 11/10/2024   • COLORECTAL CANCER SCREENING  05/14/2026   • HEPATITIS C SCREENING  Completed   • Hepatitis B  Aged Out         Alcohol use:  reports no history of alcohol use.  Nicotine status  reports that she has quit smoking. Her smoking use included cigarettes. She has a 11.50 pack-year smoking history. She has never used smokeless tobacco.    Goals     • Fasting Blood Glucose       Barriers: compliance with diet      • Quit smoking / using tobacco           RISK SCORE: 3          PGY-3  [facility]  This document has been electronically signed by Carline Coffey MD on November 13, 2021 19:33 CST

## 2021-10-04 LAB — GABAPENTIN UR-MCNC: 224 UG/ML

## 2021-10-08 RX ORDER — ATORVASTATIN CALCIUM 20 MG/1
TABLET, FILM COATED ORAL
Qty: 30 TABLET | Refills: 1 | Status: SHIPPED | OUTPATIENT
Start: 2021-10-08 | End: 2021-12-10

## 2021-10-08 RX ORDER — INSULIN GLARGINE 100 [IU]/ML
INJECTION, SOLUTION SUBCUTANEOUS
Qty: 15 ML | Refills: 1 | Status: SHIPPED | OUTPATIENT
Start: 2021-10-08 | End: 2021-10-28 | Stop reason: HOSPADM

## 2021-10-08 RX ORDER — LIDOCAINE 50 MG/G
PATCH TOPICAL
Qty: 30 PATCH | Refills: 1 | Status: ON HOLD | OUTPATIENT
Start: 2021-10-08 | End: 2022-05-27

## 2021-10-08 RX ORDER — METOPROLOL TARTRATE 50 MG/1
TABLET, FILM COATED ORAL
Qty: 60 TABLET | Refills: 1 | Status: SHIPPED | OUTPATIENT
Start: 2021-10-08 | End: 2021-12-10

## 2021-10-08 RX ORDER — MIRABEGRON 25 MG/1
TABLET, FILM COATED, EXTENDED RELEASE ORAL
Qty: 30 TABLET | Refills: 0 | OUTPATIENT
Start: 2021-10-08

## 2021-10-11 ENCOUNTER — HOSPITAL ENCOUNTER (INPATIENT)
Facility: HOSPITAL | Age: 61
LOS: 16 days | Discharge: NURSING, REHAB OR LONG-TERM CARE | End: 2021-10-28
Attending: FAMILY MEDICINE | Admitting: FAMILY MEDICINE

## 2021-10-11 ENCOUNTER — APPOINTMENT (OUTPATIENT)
Dept: GENERAL RADIOLOGY | Facility: HOSPITAL | Age: 61
End: 2021-10-11

## 2021-10-11 DIAGNOSIS — Z78.9 IMPAIRED MOBILITY AND ADLS: ICD-10-CM

## 2021-10-11 DIAGNOSIS — IMO0002 UNCONTROLLED TYPE 2 DIABETES MELLITUS WITH DIABETIC POLYNEUROPATHY, WITH LONG-TERM CURRENT USE OF INSULIN: ICD-10-CM

## 2021-10-11 DIAGNOSIS — N18.9 ANEMIA DUE TO CHRONIC KIDNEY DISEASE, UNSPECIFIED CKD STAGE: ICD-10-CM

## 2021-10-11 DIAGNOSIS — J42 CHRONIC BRONCHITIS, UNSPECIFIED CHRONIC BRONCHITIS TYPE (HCC): ICD-10-CM

## 2021-10-11 DIAGNOSIS — Z99.2 ESRD (END STAGE RENAL DISEASE) ON DIALYSIS (HCC): ICD-10-CM

## 2021-10-11 DIAGNOSIS — I50.9 ACUTE ON CHRONIC CONGESTIVE HEART FAILURE, UNSPECIFIED HEART FAILURE TYPE (HCC): Primary | ICD-10-CM

## 2021-10-11 DIAGNOSIS — Z74.09 IMPAIRED FUNCTIONAL MOBILITY, BALANCE, GAIT, AND ENDURANCE: ICD-10-CM

## 2021-10-11 DIAGNOSIS — Z74.09 IMPAIRED MOBILITY AND ADLS: ICD-10-CM

## 2021-10-11 DIAGNOSIS — D63.1 ANEMIA DUE TO CHRONIC KIDNEY DISEASE, UNSPECIFIED CKD STAGE: ICD-10-CM

## 2021-10-11 DIAGNOSIS — K92.2 GASTROINTESTINAL HEMORRHAGE, UNSPECIFIED GASTROINTESTINAL HEMORRHAGE TYPE: ICD-10-CM

## 2021-10-11 DIAGNOSIS — N18.9 CHRONIC KIDNEY DISEASE, UNSPECIFIED CKD STAGE: ICD-10-CM

## 2021-10-11 DIAGNOSIS — N18.6 ESRD (END STAGE RENAL DISEASE) ON DIALYSIS (HCC): ICD-10-CM

## 2021-10-11 PROBLEM — J96.10 CHRONIC RESPIRATORY FAILURE: Status: ACTIVE | Noted: 2020-06-24

## 2021-10-11 PROBLEM — R06.00 DYSPNEA: Status: ACTIVE | Noted: 2020-06-24

## 2021-10-11 LAB
ABO GROUP BLD: NORMAL
ALBUMIN SERPL-MCNC: 3 G/DL (ref 3.5–5.2)
ALBUMIN/GLOB SERPL: 0.7 G/DL
ALP SERPL-CCNC: 173 U/L (ref 39–117)
ALT SERPL W P-5'-P-CCNC: 8 U/L (ref 1–33)
ANION GAP SERPL CALCULATED.3IONS-SCNC: 11 MMOL/L (ref 5–15)
AST SERPL-CCNC: 9 U/L (ref 1–32)
BACTERIA UR QL AUTO: ABNORMAL /HPF
BASOPHILS # BLD AUTO: 0.06 10*3/MM3 (ref 0–0.2)
BASOPHILS NFR BLD AUTO: 0.5 % (ref 0–1.5)
BILIRUB SERPL-MCNC: 0.2 MG/DL (ref 0–1.2)
BILIRUB UR QL STRIP: NEGATIVE
BLD GP AB SCN SERPL QL: NEGATIVE
BUN SERPL-MCNC: 57 MG/DL (ref 8–23)
BUN/CREAT SERPL: 21.2 (ref 7–25)
CALCIUM SPEC-SCNC: 8.4 MG/DL (ref 8.6–10.5)
CHLORIDE SERPL-SCNC: 99 MMOL/L (ref 98–107)
CLARITY UR: CLEAR
CO2 SERPL-SCNC: 23 MMOL/L (ref 22–29)
COLOR UR: YELLOW
CREAT SERPL-MCNC: 2.69 MG/DL (ref 0.57–1)
D-LACTATE SERPL-SCNC: 1.3 MMOL/L (ref 0.5–2)
DEPRECATED RDW RBC AUTO: 51.9 FL (ref 37–54)
EOSINOPHIL # BLD AUTO: 0.14 10*3/MM3 (ref 0–0.4)
EOSINOPHIL NFR BLD AUTO: 1.2 % (ref 0.3–6.2)
ERYTHROCYTE [DISTWIDTH] IN BLOOD BY AUTOMATED COUNT: 16.4 % (ref 12.3–15.4)
FLUAV SUBTYP SPEC NAA+PROBE: NOT DETECTED
FLUBV RNA ISLT QL NAA+PROBE: NOT DETECTED
GFR SERPL CREATININE-BSD FRML MDRD: 18 ML/MIN/1.73
GLOBULIN UR ELPH-MCNC: 4.2 GM/DL
GLUCOSE BLDC GLUCOMTR-MCNC: 177 MG/DL (ref 70–130)
GLUCOSE BLDC GLUCOMTR-MCNC: 210 MG/DL (ref 70–130)
GLUCOSE SERPL-MCNC: 333 MG/DL (ref 65–99)
GLUCOSE UR STRIP-MCNC: ABNORMAL MG/DL
HCT VFR BLD AUTO: 20 % (ref 34–46.6)
HGB BLD-MCNC: 6.2 G/DL (ref 12–15.9)
HGB UR QL STRIP.AUTO: ABNORMAL
HOLD SPECIMEN: NORMAL
HOLD SPECIMEN: NORMAL
HYALINE CASTS UR QL AUTO: ABNORMAL /LPF
IMM GRANULOCYTES # BLD AUTO: 0.49 10*3/MM3 (ref 0–0.05)
IMM GRANULOCYTES NFR BLD AUTO: 4.1 % (ref 0–0.5)
INR PPP: 1.13 (ref 0.8–1.2)
KETONES UR QL STRIP: NEGATIVE
LEUKOCYTE ESTERASE UR QL STRIP.AUTO: NEGATIVE
LYMPHOCYTES # BLD AUTO: 2.29 10*3/MM3 (ref 0.7–3.1)
LYMPHOCYTES NFR BLD AUTO: 19.1 % (ref 19.6–45.3)
Lab: NORMAL
MCH RBC QN AUTO: 27.3 PG (ref 26.6–33)
MCHC RBC AUTO-ENTMCNC: 31 G/DL (ref 31.5–35.7)
MCV RBC AUTO: 88.1 FL (ref 79–97)
MONOCYTES # BLD AUTO: 0.71 10*3/MM3 (ref 0.1–0.9)
MONOCYTES NFR BLD AUTO: 5.9 % (ref 5–12)
NEUTROPHILS NFR BLD AUTO: 69.2 % (ref 42.7–76)
NEUTROPHILS NFR BLD AUTO: 8.33 10*3/MM3 (ref 1.7–7)
NITRITE UR QL STRIP: NEGATIVE
NRBC BLD AUTO-RTO: 0.5 /100 WBC (ref 0–0.2)
NT-PROBNP SERPL-MCNC: 6746 PG/ML (ref 0–900)
PH UR STRIP.AUTO: 7 [PH] (ref 5–9)
PLATELET # BLD AUTO: 281 10*3/MM3 (ref 140–450)
PMV BLD AUTO: 9.2 FL (ref 6–12)
POTASSIUM SERPL-SCNC: 4.6 MMOL/L (ref 3.5–5.2)
PROT SERPL-MCNC: 7.2 G/DL (ref 6–8.5)
PROT UR QL STRIP: ABNORMAL
PROTHROMBIN TIME: 14.4 SECONDS (ref 11.1–15.3)
RBC # BLD AUTO: 2.27 10*6/MM3 (ref 3.77–5.28)
RBC # UR: ABNORMAL /HPF
REF LAB TEST METHOD: ABNORMAL
RH BLD: POSITIVE
SARS-COV-2 RNA PNL SPEC NAA+PROBE: NOT DETECTED
SODIUM SERPL-SCNC: 133 MMOL/L (ref 136–145)
SP GR UR STRIP: 1.01 (ref 1–1.03)
SQUAMOUS #/AREA URNS HPF: ABNORMAL /HPF
T&S EXPIRATION DATE: NORMAL
TROPONIN T SERPL-MCNC: 0.04 NG/ML (ref 0–0.03)
TROPONIN T SERPL-MCNC: 0.05 NG/ML (ref 0–0.03)
UROBILINOGEN UR QL STRIP: ABNORMAL
WBC # BLD AUTO: 12.02 10*3/MM3 (ref 3.4–10.8)
WBC UR QL AUTO: ABNORMAL /HPF
WHOLE BLOOD HOLD SPECIMEN: NORMAL
WHOLE BLOOD HOLD SPECIMEN: NORMAL

## 2021-10-11 PROCEDURE — 85610 PROTHROMBIN TIME: CPT | Performed by: STUDENT IN AN ORGANIZED HEALTH CARE EDUCATION/TRAINING PROGRAM

## 2021-10-11 PROCEDURE — 86923 COMPATIBILITY TEST ELECTRIC: CPT

## 2021-10-11 PROCEDURE — G0378 HOSPITAL OBSERVATION PER HR: HCPCS

## 2021-10-11 PROCEDURE — 93010 ELECTROCARDIOGRAM REPORT: CPT | Performed by: INTERNAL MEDICINE

## 2021-10-11 PROCEDURE — 25010000002 AZITHROMYCIN PER 500 MG: Performed by: FAMILY MEDICINE

## 2021-10-11 PROCEDURE — 25010000002 CEFTRIAXONE PER 250 MG: Performed by: STUDENT IN AN ORGANIZED HEALTH CARE EDUCATION/TRAINING PROGRAM

## 2021-10-11 PROCEDURE — 93005 ELECTROCARDIOGRAM TRACING: CPT | Performed by: FAMILY MEDICINE

## 2021-10-11 PROCEDURE — 86900 BLOOD TYPING SEROLOGIC ABO: CPT

## 2021-10-11 PROCEDURE — 83605 ASSAY OF LACTIC ACID: CPT | Performed by: FAMILY MEDICINE

## 2021-10-11 PROCEDURE — 85025 COMPLETE CBC W/AUTO DIFF WBC: CPT | Performed by: FAMILY MEDICINE

## 2021-10-11 PROCEDURE — 82962 GLUCOSE BLOOD TEST: CPT

## 2021-10-11 PROCEDURE — 86901 BLOOD TYPING SEROLOGIC RH(D): CPT | Performed by: FAMILY MEDICINE

## 2021-10-11 PROCEDURE — 36430 TRANSFUSION BLD/BLD COMPNT: CPT

## 2021-10-11 PROCEDURE — 83880 ASSAY OF NATRIURETIC PEPTIDE: CPT | Performed by: FAMILY MEDICINE

## 2021-10-11 PROCEDURE — 71046 X-RAY EXAM CHEST 2 VIEWS: CPT

## 2021-10-11 PROCEDURE — 86850 RBC ANTIBODY SCREEN: CPT | Performed by: FAMILY MEDICINE

## 2021-10-11 PROCEDURE — 87040 BLOOD CULTURE FOR BACTERIA: CPT | Performed by: FAMILY MEDICINE

## 2021-10-11 PROCEDURE — 87150 DNA/RNA AMPLIFIED PROBE: CPT | Performed by: FAMILY MEDICINE

## 2021-10-11 PROCEDURE — 80053 COMPREHEN METABOLIC PANEL: CPT | Performed by: FAMILY MEDICINE

## 2021-10-11 PROCEDURE — 94799 UNLISTED PULMONARY SVC/PX: CPT

## 2021-10-11 PROCEDURE — 86900 BLOOD TYPING SEROLOGIC ABO: CPT | Performed by: FAMILY MEDICINE

## 2021-10-11 PROCEDURE — 25010000002 FUROSEMIDE PER 20 MG: Performed by: FAMILY MEDICINE

## 2021-10-11 PROCEDURE — 63710000001 INSULIN DETEMIR PER 5 UNITS: Performed by: STUDENT IN AN ORGANIZED HEALTH CARE EDUCATION/TRAINING PROGRAM

## 2021-10-11 PROCEDURE — 84484 ASSAY OF TROPONIN QUANT: CPT | Performed by: STUDENT IN AN ORGANIZED HEALTH CARE EDUCATION/TRAINING PROGRAM

## 2021-10-11 PROCEDURE — 36415 COLL VENOUS BLD VENIPUNCTURE: CPT | Performed by: STUDENT IN AN ORGANIZED HEALTH CARE EDUCATION/TRAINING PROGRAM

## 2021-10-11 PROCEDURE — 94760 N-INVAS EAR/PLS OXIMETRY 1: CPT

## 2021-10-11 PROCEDURE — 99285 EMERGENCY DEPT VISIT HI MDM: CPT

## 2021-10-11 PROCEDURE — 25010000002 FUROSEMIDE PER 20 MG: Performed by: STUDENT IN AN ORGANIZED HEALTH CARE EDUCATION/TRAINING PROGRAM

## 2021-10-11 PROCEDURE — 84484 ASSAY OF TROPONIN QUANT: CPT | Performed by: FAMILY MEDICINE

## 2021-10-11 PROCEDURE — 99218 PR INITIAL OBSERVATION CARE/DAY 30 MINUTES: CPT | Performed by: STUDENT IN AN ORGANIZED HEALTH CARE EDUCATION/TRAINING PROGRAM

## 2021-10-11 PROCEDURE — 93005 ELECTROCARDIOGRAM TRACING: CPT | Performed by: STUDENT IN AN ORGANIZED HEALTH CARE EDUCATION/TRAINING PROGRAM

## 2021-10-11 PROCEDURE — 87636 SARSCOV2 & INF A&B AMP PRB: CPT | Performed by: FAMILY MEDICINE

## 2021-10-11 PROCEDURE — 94640 AIRWAY INHALATION TREATMENT: CPT

## 2021-10-11 PROCEDURE — 81001 URINALYSIS AUTO W/SCOPE: CPT | Performed by: STUDENT IN AN ORGANIZED HEALTH CARE EDUCATION/TRAINING PROGRAM

## 2021-10-11 PROCEDURE — P9016 RBC LEUKOCYTES REDUCED: HCPCS

## 2021-10-11 RX ORDER — FUROSEMIDE 10 MG/ML
40 INJECTION INTRAMUSCULAR; INTRAVENOUS ONCE
Status: COMPLETED | OUTPATIENT
Start: 2021-10-11 | End: 2021-10-11

## 2021-10-11 RX ORDER — GABAPENTIN 400 MG/1
800 CAPSULE ORAL 3 TIMES DAILY
Status: DISCONTINUED | OUTPATIENT
Start: 2021-10-11 | End: 2021-10-15

## 2021-10-11 RX ORDER — FERROUS SULFATE TAB EC 324 MG (65 MG FE EQUIVALENT) 324 (65 FE) MG
324 TABLET DELAYED RESPONSE ORAL
Status: DISCONTINUED | OUTPATIENT
Start: 2021-10-12 | End: 2021-10-28 | Stop reason: HOSPADM

## 2021-10-11 RX ORDER — ISOSORBIDE MONONITRATE 30 MG/1
30 TABLET, EXTENDED RELEASE ORAL
Status: DISCONTINUED | OUTPATIENT
Start: 2021-10-12 | End: 2021-10-28 | Stop reason: HOSPADM

## 2021-10-11 RX ORDER — ATORVASTATIN CALCIUM 20 MG/1
20 TABLET, FILM COATED ORAL DAILY
Status: DISCONTINUED | OUTPATIENT
Start: 2021-10-12 | End: 2021-10-28 | Stop reason: HOSPADM

## 2021-10-11 RX ORDER — NICOTINE POLACRILEX 4 MG
15 LOZENGE BUCCAL
Status: DISCONTINUED | OUTPATIENT
Start: 2021-10-11 | End: 2021-10-28 | Stop reason: HOSPADM

## 2021-10-11 RX ORDER — ALBUTEROL SULFATE 90 UG/1
2 AEROSOL, METERED RESPIRATORY (INHALATION) EVERY 4 HOURS PRN
Status: DISCONTINUED | OUTPATIENT
Start: 2021-10-11 | End: 2021-10-11 | Stop reason: CLARIF

## 2021-10-11 RX ORDER — NYSTATIN 100000 [USP'U]/G
POWDER TOPICAL 3 TIMES DAILY
Status: DISCONTINUED | OUTPATIENT
Start: 2021-10-11 | End: 2021-10-28 | Stop reason: HOSPADM

## 2021-10-11 RX ORDER — CALCIUM CARBONATE 200(500)MG
1 TABLET,CHEWABLE ORAL 2 TIMES DAILY PRN
Status: DISCONTINUED | OUTPATIENT
Start: 2021-10-11 | End: 2021-10-28 | Stop reason: HOSPADM

## 2021-10-11 RX ORDER — ACETAMINOPHEN 325 MG/1
650 TABLET ORAL EVERY 4 HOURS PRN
Status: DISCONTINUED | OUTPATIENT
Start: 2021-10-11 | End: 2021-10-12

## 2021-10-11 RX ORDER — PANTOPRAZOLE SODIUM 40 MG/10ML
40 INJECTION, POWDER, LYOPHILIZED, FOR SOLUTION INTRAVENOUS EVERY 12 HOURS SCHEDULED
Status: DISCONTINUED | OUTPATIENT
Start: 2021-10-11 | End: 2021-10-13

## 2021-10-11 RX ORDER — ONDANSETRON 4 MG/1
4 TABLET, FILM COATED ORAL EVERY 6 HOURS PRN
Status: DISCONTINUED | OUTPATIENT
Start: 2021-10-11 | End: 2021-10-28 | Stop reason: HOSPADM

## 2021-10-11 RX ORDER — SODIUM CHLORIDE 9 MG/ML
50 INJECTION, SOLUTION INTRAVENOUS CONTINUOUS
Status: DISCONTINUED | OUTPATIENT
Start: 2021-10-11 | End: 2021-10-12

## 2021-10-11 RX ORDER — CYCLOBENZAPRINE HCL 10 MG
10 TABLET ORAL 2 TIMES DAILY PRN
Status: DISCONTINUED | OUTPATIENT
Start: 2021-10-11 | End: 2021-10-28 | Stop reason: HOSPADM

## 2021-10-11 RX ORDER — METOPROLOL TARTRATE 50 MG/1
50 TABLET, FILM COATED ORAL EVERY 12 HOURS SCHEDULED
Status: DISCONTINUED | OUTPATIENT
Start: 2021-10-11 | End: 2021-10-28 | Stop reason: HOSPADM

## 2021-10-11 RX ORDER — SODIUM CHLORIDE 0.9 % (FLUSH) 0.9 %
10 SYRINGE (ML) INJECTION EVERY 12 HOURS SCHEDULED
Status: DISCONTINUED | OUTPATIENT
Start: 2021-10-11 | End: 2021-10-19

## 2021-10-11 RX ORDER — IPRATROPIUM BROMIDE AND ALBUTEROL SULFATE 2.5; .5 MG/3ML; MG/3ML
3 SOLUTION RESPIRATORY (INHALATION)
Status: DISCONTINUED | OUTPATIENT
Start: 2021-10-11 | End: 2021-10-28 | Stop reason: HOSPADM

## 2021-10-11 RX ORDER — LISINOPRIL 5 MG/1
10 TABLET ORAL DAILY
Status: DISCONTINUED | OUTPATIENT
Start: 2021-10-12 | End: 2021-10-15

## 2021-10-11 RX ORDER — DULOXETIN HYDROCHLORIDE 20 MG/1
20 CAPSULE, DELAYED RELEASE ORAL DAILY
Status: DISCONTINUED | OUTPATIENT
Start: 2021-10-12 | End: 2021-10-28 | Stop reason: HOSPADM

## 2021-10-11 RX ORDER — CETIRIZINE HYDROCHLORIDE 10 MG/1
10 TABLET ORAL DAILY
Status: DISCONTINUED | OUTPATIENT
Start: 2021-10-12 | End: 2021-10-28 | Stop reason: HOSPADM

## 2021-10-11 RX ORDER — PANTOPRAZOLE SODIUM 40 MG/10ML
40 INJECTION, POWDER, LYOPHILIZED, FOR SOLUTION INTRAVENOUS
Status: DISCONTINUED | OUTPATIENT
Start: 2021-10-12 | End: 2021-10-11

## 2021-10-11 RX ORDER — DEXTROSE MONOHYDRATE 25 G/50ML
25 INJECTION, SOLUTION INTRAVENOUS
Status: DISCONTINUED | OUTPATIENT
Start: 2021-10-11 | End: 2021-10-28 | Stop reason: HOSPADM

## 2021-10-11 RX ORDER — FUROSEMIDE 10 MG/ML
10 INJECTION INTRAMUSCULAR; INTRAVENOUS ONCE
Status: COMPLETED | OUTPATIENT
Start: 2021-10-11 | End: 2021-10-11

## 2021-10-11 RX ORDER — OXYBUTYNIN CHLORIDE 5 MG/1
5 TABLET, EXTENDED RELEASE ORAL DAILY
Status: DISCONTINUED | OUTPATIENT
Start: 2021-10-12 | End: 2021-10-28 | Stop reason: HOSPADM

## 2021-10-11 RX ORDER — MONTELUKAST SODIUM 10 MG/1
10 TABLET ORAL NIGHTLY
Status: DISCONTINUED | OUTPATIENT
Start: 2021-10-11 | End: 2021-10-28 | Stop reason: HOSPADM

## 2021-10-11 RX ORDER — ACETAMINOPHEN 160 MG/5ML
650 SOLUTION ORAL EVERY 4 HOURS PRN
Status: DISCONTINUED | OUTPATIENT
Start: 2021-10-11 | End: 2021-10-11 | Stop reason: SDUPTHER

## 2021-10-11 RX ORDER — SODIUM CHLORIDE 0.9 % (FLUSH) 0.9 %
10 SYRINGE (ML) INJECTION AS NEEDED
Status: DISCONTINUED | OUTPATIENT
Start: 2021-10-11 | End: 2021-10-19

## 2021-10-11 RX ORDER — ALBUTEROL SULFATE 2.5 MG/3ML
2.5 SOLUTION RESPIRATORY (INHALATION) EVERY 4 HOURS PRN
Status: DISCONTINUED | OUTPATIENT
Start: 2021-10-11 | End: 2021-10-28 | Stop reason: HOSPADM

## 2021-10-11 RX ORDER — SODIUM CHLORIDE 0.9 % (FLUSH) 0.9 %
10 SYRINGE (ML) INJECTION AS NEEDED
Status: DISCONTINUED | OUTPATIENT
Start: 2021-10-11 | End: 2021-10-28 | Stop reason: HOSPADM

## 2021-10-11 RX ORDER — ACETAMINOPHEN 650 MG/1
650 SUPPOSITORY RECTAL EVERY 4 HOURS PRN
Status: DISCONTINUED | OUTPATIENT
Start: 2021-10-11 | End: 2021-10-12

## 2021-10-11 RX ORDER — LEVOTHYROXINE SODIUM 0.03 MG/1
25 TABLET ORAL
Status: DISCONTINUED | OUTPATIENT
Start: 2021-10-12 | End: 2021-10-28 | Stop reason: HOSPADM

## 2021-10-11 RX ORDER — SODIUM CHLORIDE 9 MG/ML
250 INJECTION, SOLUTION INTRAVENOUS CONTINUOUS
Status: DISCONTINUED | OUTPATIENT
Start: 2021-10-11 | End: 2021-10-11

## 2021-10-11 RX ORDER — ONDANSETRON 2 MG/ML
4 INJECTION INTRAMUSCULAR; INTRAVENOUS EVERY 6 HOURS PRN
Status: DISCONTINUED | OUTPATIENT
Start: 2021-10-11 | End: 2021-10-28 | Stop reason: HOSPADM

## 2021-10-11 RX ADMIN — GABAPENTIN 800 MG: 400 CAPSULE ORAL at 21:34

## 2021-10-11 RX ADMIN — PANTOPRAZOLE SODIUM 40 MG: 40 INJECTION, POWDER, FOR SOLUTION INTRAVENOUS at 22:16

## 2021-10-11 RX ADMIN — NYSTATIN: 100000 POWDER TOPICAL at 21:34

## 2021-10-11 RX ADMIN — CEFTRIAXONE SODIUM 2 G: 2 INJECTION, POWDER, FOR SOLUTION INTRAMUSCULAR; INTRAVENOUS at 19:47

## 2021-10-11 RX ADMIN — ACETAMINOPHEN 650 MG: 325 TABLET, FILM COATED ORAL at 22:11

## 2021-10-11 RX ADMIN — SODIUM CHLORIDE 250 ML/HR: 9 INJECTION, SOLUTION INTRAVENOUS at 19:46

## 2021-10-11 RX ADMIN — MONTELUKAST 10 MG: 10 TABLET, FILM COATED ORAL at 21:34

## 2021-10-11 RX ADMIN — FUROSEMIDE 40 MG: 10 INJECTION INTRAMUSCULAR; INTRAVENOUS at 15:55

## 2021-10-11 RX ADMIN — FUROSEMIDE 10 MG: 10 INJECTION, SOLUTION INTRAMUSCULAR; INTRAVENOUS at 22:06

## 2021-10-11 RX ADMIN — FUROSEMIDE 40 MG: 10 INJECTION INTRAMUSCULAR; INTRAVENOUS at 15:45

## 2021-10-11 RX ADMIN — AZITHROMYCIN 500 MG: 500 INJECTION, POWDER, LYOPHILIZED, FOR SOLUTION INTRAVENOUS at 17:28

## 2021-10-11 RX ADMIN — SODIUM CHLORIDE 50 ML/HR: 9 INJECTION, SOLUTION INTRAVENOUS at 22:07

## 2021-10-11 RX ADMIN — METOPROLOL TARTRATE 50 MG: 50 TABLET, FILM COATED ORAL at 21:34

## 2021-10-11 RX ADMIN — IPRATROPIUM BROMIDE AND ALBUTEROL SULFATE 3 ML: 2.5; .5 SOLUTION RESPIRATORY (INHALATION) at 22:27

## 2021-10-11 RX ADMIN — INSULIN DETEMIR 30 UNITS: 100 INJECTION, SOLUTION SUBCUTANEOUS at 22:17

## 2021-10-12 ENCOUNTER — APPOINTMENT (OUTPATIENT)
Dept: ULTRASOUND IMAGING | Facility: HOSPITAL | Age: 61
End: 2021-10-12

## 2021-10-12 ENCOUNTER — APPOINTMENT (OUTPATIENT)
Dept: INTERVENTIONAL RADIOLOGY/VASCULAR | Facility: HOSPITAL | Age: 61
End: 2021-10-12

## 2021-10-12 ENCOUNTER — APPOINTMENT (OUTPATIENT)
Dept: CARDIOLOGY | Facility: HOSPITAL | Age: 61
End: 2021-10-12

## 2021-10-12 ENCOUNTER — APPOINTMENT (OUTPATIENT)
Dept: GASTROENTEROLOGY | Facility: HOSPITAL | Age: 61
End: 2021-10-12

## 2021-10-12 PROBLEM — R77.8 ELEVATED TROPONIN: Status: ACTIVE | Noted: 2021-10-12

## 2021-10-12 PROBLEM — R79.89 ELEVATED TROPONIN: Status: ACTIVE | Noted: 2021-10-12

## 2021-10-12 LAB
ALBUMIN SERPL-MCNC: 2.9 G/DL (ref 3.5–5.2)
ALBUMIN/GLOB SERPL: 0.7 G/DL
ALP SERPL-CCNC: 139 U/L (ref 39–117)
ALT SERPL W P-5'-P-CCNC: 7 U/L (ref 1–33)
ANION GAP SERPL CALCULATED.3IONS-SCNC: 12 MMOL/L (ref 5–15)
AST SERPL-CCNC: 7 U/L (ref 1–32)
BASOPHILS # BLD AUTO: 0.07 10*3/MM3 (ref 0–0.2)
BASOPHILS NFR BLD AUTO: 0.8 % (ref 0–1.5)
BH CV ECHO MEAS - ASC AORTA: 3.2 CM
BH CV ECHO MEAS - BSA(HAYCOCK): 2.6 M^2
BH CV ECHO MEAS - BSA: 2.3 M^2
BH CV ECHO MEAS - BZI_BMI: 56.3 KILOGRAMS/M^2
BH CV ECHO MEAS - BZI_METRIC_HEIGHT: 157.5 CM
BH CV ECHO MEAS - BZI_METRIC_WEIGHT: 139.7 KG
BH CV ECHO MEAS - EDV(CUBED): 86.9 ML
BH CV ECHO MEAS - EDV(MOD-SP2): 52.3 ML
BH CV ECHO MEAS - EDV(MOD-SP4): 104 ML
BH CV ECHO MEAS - EDV(TEICH): 89.1 ML
BH CV ECHO MEAS - EF(CUBED): 71.1 %
BH CV ECHO MEAS - EF(MOD-SP2): 56.6 %
BH CV ECHO MEAS - EF(MOD-SP4): 77.4 %
BH CV ECHO MEAS - EF(TEICH): 62.9 %
BH CV ECHO MEAS - ESV(CUBED): 25.2 ML
BH CV ECHO MEAS - ESV(MOD-SP2): 22.7 ML
BH CV ECHO MEAS - ESV(MOD-SP4): 23.5 ML
BH CV ECHO MEAS - ESV(TEICH): 33 ML
BH CV ECHO MEAS - FS: 33.9 %
BH CV ECHO MEAS - IVS/LVPW: 0.83
BH CV ECHO MEAS - IVSD: 1.3 CM
BH CV ECHO MEAS - LA DIMENSION: 4.3 CM
BH CV ECHO MEAS - LV DIASTOLIC VOL/BSA (35-75): 45.3 ML/M^2
BH CV ECHO MEAS - LV MASS(C)D: 238.8 GRAMS
BH CV ECHO MEAS - LV MASS(C)DI: 104 GRAMS/M^2
BH CV ECHO MEAS - LV SYSTOLIC VOL/BSA (12-30): 10.2 ML/M^2
BH CV ECHO MEAS - LVIDD: 4.4 CM
BH CV ECHO MEAS - LVIDS: 2.9 CM
BH CV ECHO MEAS - LVLD AP2: 7.4 CM
BH CV ECHO MEAS - LVLD AP4: 7.7 CM
BH CV ECHO MEAS - LVLS AP2: 5.7 CM
BH CV ECHO MEAS - LVLS AP4: 6.3 CM
BH CV ECHO MEAS - LVOT AREA (M): 3.1 CM^2
BH CV ECHO MEAS - LVOT AREA: 3.1 CM^2
BH CV ECHO MEAS - LVOT DIAM: 2 CM
BH CV ECHO MEAS - LVPWD: 1.5 CM
BH CV ECHO MEAS - RVDD: 3.7 CM
BH CV ECHO MEAS - SI(CUBED): 26.9 ML/M^2
BH CV ECHO MEAS - SI(MOD-SP2): 12.9 ML/M^2
BH CV ECHO MEAS - SI(MOD-SP4): 35.1 ML/M^2
BH CV ECHO MEAS - SI(TEICH): 24.4 ML/M^2
BH CV ECHO MEAS - SV(CUBED): 61.8 ML
BH CV ECHO MEAS - SV(MOD-SP2): 29.6 ML
BH CV ECHO MEAS - SV(MOD-SP4): 80.5 ML
BH CV ECHO MEAS - SV(TEICH): 56.1 ML
BILIRUB SERPL-MCNC: 0.3 MG/DL (ref 0–1.2)
BUN SERPL-MCNC: 51 MG/DL (ref 8–23)
BUN/CREAT SERPL: 19 (ref 7–25)
CALCIUM SPEC-SCNC: 8.5 MG/DL (ref 8.6–10.5)
CHLORIDE SERPL-SCNC: 102 MMOL/L (ref 98–107)
CO2 SERPL-SCNC: 24 MMOL/L (ref 22–29)
CREAT SERPL-MCNC: 2.69 MG/DL (ref 0.57–1)
DEPRECATED RDW RBC AUTO: 49.8 FL (ref 37–54)
EOSINOPHIL # BLD AUTO: 0.23 10*3/MM3 (ref 0–0.4)
EOSINOPHIL NFR BLD AUTO: 2.5 % (ref 0.3–6.2)
ERYTHROCYTE [DISTWIDTH] IN BLOOD BY AUTOMATED COUNT: 16.2 % (ref 12.3–15.4)
GFR SERPL CREATININE-BSD FRML MDRD: 18 ML/MIN/1.73
GLOBULIN UR ELPH-MCNC: 4 GM/DL
GLUCOSE BLDC GLUCOMTR-MCNC: 182 MG/DL (ref 70–130)
GLUCOSE BLDC GLUCOMTR-MCNC: 191 MG/DL (ref 70–130)
GLUCOSE BLDC GLUCOMTR-MCNC: 246 MG/DL (ref 70–130)
GLUCOSE SERPL-MCNC: 173 MG/DL (ref 65–99)
HCT VFR BLD AUTO: 24.3 % (ref 34–46.6)
HGB BLD-MCNC: 7.6 G/DL (ref 12–15.9)
IMM GRANULOCYTES # BLD AUTO: 0.25 10*3/MM3 (ref 0–0.05)
IMM GRANULOCYTES NFR BLD AUTO: 2.7 % (ref 0–0.5)
LV EF 2D ECHO EST: 63 %
LYMPHOCYTES # BLD AUTO: 2.31 10*3/MM3 (ref 0.7–3.1)
LYMPHOCYTES NFR BLD AUTO: 25.3 % (ref 19.6–45.3)
MAXIMAL PREDICTED HEART RATE: 158 BPM
MCH RBC QN AUTO: 27 PG (ref 26.6–33)
MCHC RBC AUTO-ENTMCNC: 31.3 G/DL (ref 31.5–35.7)
MCV RBC AUTO: 86.2 FL (ref 79–97)
MONOCYTES # BLD AUTO: 0.66 10*3/MM3 (ref 0.1–0.9)
MONOCYTES NFR BLD AUTO: 7.2 % (ref 5–12)
NEUTROPHILS NFR BLD AUTO: 5.62 10*3/MM3 (ref 1.7–7)
NEUTROPHILS NFR BLD AUTO: 61.5 % (ref 42.7–76)
NRBC BLD AUTO-RTO: 0.9 /100 WBC (ref 0–0.2)
PLATELET # BLD AUTO: 241 10*3/MM3 (ref 140–450)
PMV BLD AUTO: 8.9 FL (ref 6–12)
POTASSIUM SERPL-SCNC: 4.1 MMOL/L (ref 3.5–5.2)
PROT SERPL-MCNC: 6.9 G/DL (ref 6–8.5)
RBC # BLD AUTO: 2.82 10*6/MM3 (ref 3.77–5.28)
SODIUM SERPL-SCNC: 138 MMOL/L (ref 136–145)
STRESS TARGET HR: 134 BPM
TROPONIN T SERPL-MCNC: 0.04 NG/ML (ref 0–0.03)
WBC # BLD AUTO: 9.14 10*3/MM3 (ref 3.4–10.8)

## 2021-10-12 PROCEDURE — 82962 GLUCOSE BLOOD TEST: CPT

## 2021-10-12 PROCEDURE — 84484 ASSAY OF TROPONIN QUANT: CPT | Performed by: STUDENT IN AN ORGANIZED HEALTH CARE EDUCATION/TRAINING PROGRAM

## 2021-10-12 PROCEDURE — 05HB33Z INSERTION OF INFUSION DEVICE INTO RIGHT BASILIC VEIN, PERCUTANEOUS APPROACH: ICD-10-PCS | Performed by: RADIOLOGY

## 2021-10-12 PROCEDURE — 93308 TTE F-UP OR LMTD: CPT

## 2021-10-12 PROCEDURE — 76937 US GUIDE VASCULAR ACCESS: CPT

## 2021-10-12 PROCEDURE — 93325 DOPPLER ECHO COLOR FLOW MAPG: CPT

## 2021-10-12 PROCEDURE — 93308 TTE F-UP OR LMTD: CPT | Performed by: INTERNAL MEDICINE

## 2021-10-12 PROCEDURE — 85025 COMPLETE CBC W/AUTO DIFF WBC: CPT | Performed by: STUDENT IN AN ORGANIZED HEALTH CARE EDUCATION/TRAINING PROGRAM

## 2021-10-12 PROCEDURE — 91110 GI TRC IMG INTRAL ESOPH-ILE: CPT

## 2021-10-12 PROCEDURE — 63710000001 INSULIN ASPART PER 5 UNITS: Performed by: STUDENT IN AN ORGANIZED HEALTH CARE EDUCATION/TRAINING PROGRAM

## 2021-10-12 PROCEDURE — 36410 VNPNXR 3YR/> PHY/QHP DX/THER: CPT

## 2021-10-12 PROCEDURE — 80053 COMPREHEN METABOLIC PANEL: CPT | Performed by: STUDENT IN AN ORGANIZED HEALTH CARE EDUCATION/TRAINING PROGRAM

## 2021-10-12 PROCEDURE — 93321 DOPPLER ECHO F-UP/LMTD STD: CPT

## 2021-10-12 PROCEDURE — 93321 DOPPLER ECHO F-UP/LMTD STD: CPT | Performed by: INTERNAL MEDICINE

## 2021-10-12 PROCEDURE — 63710000001 INSULIN DETEMIR PER 5 UNITS: Performed by: STUDENT IN AN ORGANIZED HEALTH CARE EDUCATION/TRAINING PROGRAM

## 2021-10-12 PROCEDURE — 94799 UNLISTED PULMONARY SVC/PX: CPT

## 2021-10-12 PROCEDURE — C1751 CATH, INF, PER/CENT/MIDLINE: HCPCS

## 2021-10-12 PROCEDURE — 0DJ07ZZ INSPECTION OF UPPER INTESTINAL TRACT, VIA NATURAL OR ARTIFICIAL OPENING: ICD-10-PCS | Performed by: INTERNAL MEDICINE

## 2021-10-12 PROCEDURE — 94760 N-INVAS EAR/PLS OXIMETRY 1: CPT

## 2021-10-12 PROCEDURE — 25010000002 PERFLUTREN (DEFINITY) 8.476 MG IN SODIUM CHLORIDE (PF) 0.9 % 10 ML INJECTION: Performed by: STUDENT IN AN ORGANIZED HEALTH CARE EDUCATION/TRAINING PROGRAM

## 2021-10-12 PROCEDURE — 93325 DOPPLER ECHO COLOR FLOW MAPG: CPT | Performed by: INTERNAL MEDICINE

## 2021-10-12 PROCEDURE — 99222 1ST HOSP IP/OBS MODERATE 55: CPT | Performed by: INTERNAL MEDICINE

## 2021-10-12 RX ORDER — HYDROCODONE BITARTRATE AND ACETAMINOPHEN 5; 325 MG/1; MG/1
1 TABLET ORAL EVERY 6 HOURS PRN
Status: DISPENSED | OUTPATIENT
Start: 2021-10-12 | End: 2021-10-19

## 2021-10-12 RX ORDER — SODIUM CHLORIDE 0.9 % (FLUSH) 0.9 %
20 SYRINGE (ML) INJECTION AS NEEDED
Status: DISCONTINUED | OUTPATIENT
Start: 2021-10-12 | End: 2021-10-28 | Stop reason: HOSPADM

## 2021-10-12 RX ORDER — SODIUM CHLORIDE 0.9 % (FLUSH) 0.9 %
10 SYRINGE (ML) INJECTION EVERY 12 HOURS SCHEDULED
Status: DISCONTINUED | OUTPATIENT
Start: 2021-10-12 | End: 2021-10-28 | Stop reason: HOSPADM

## 2021-10-12 RX ORDER — SODIUM CHLORIDE 0.9 % (FLUSH) 0.9 %
10 SYRINGE (ML) INJECTION AS NEEDED
Status: DISCONTINUED | OUTPATIENT
Start: 2021-10-12 | End: 2021-10-28 | Stop reason: HOSPADM

## 2021-10-12 RX ORDER — SODIUM CHLORIDE 0.9 % (FLUSH) 0.9 %
10 SYRINGE (ML) INJECTION EVERY 12 HOURS SCHEDULED
Status: DISCONTINUED | OUTPATIENT
Start: 2021-10-12 | End: 2021-10-20

## 2021-10-12 RX ORDER — LANOLIN ALCOHOL/MO/W.PET/CERES
1000 CREAM (GRAM) TOPICAL DAILY
Status: DISCONTINUED | OUTPATIENT
Start: 2021-10-12 | End: 2021-10-28 | Stop reason: HOSPADM

## 2021-10-12 RX ORDER — SIMETHICONE 20 MG/.3ML
333 EMULSION ORAL ONCE
Status: COMPLETED | OUTPATIENT
Start: 2021-10-12 | End: 2021-10-12

## 2021-10-12 RX ORDER — BUMETANIDE 0.25 MG/ML
2 INJECTION INTRAMUSCULAR; INTRAVENOUS ONCE
Status: COMPLETED | OUTPATIENT
Start: 2021-10-12 | End: 2021-10-12

## 2021-10-12 RX ADMIN — METOPROLOL TARTRATE 50 MG: 50 TABLET, FILM COATED ORAL at 21:38

## 2021-10-12 RX ADMIN — ISOSORBIDE MONONITRATE 30 MG: 30 TABLET, EXTENDED RELEASE ORAL at 05:57

## 2021-10-12 RX ADMIN — PANTOPRAZOLE SODIUM 40 MG: 40 INJECTION, POWDER, FOR SOLUTION INTRAVENOUS at 21:38

## 2021-10-12 RX ADMIN — SODIUM CHLORIDE, PRESERVATIVE FREE 10 ML: 5 INJECTION INTRAVENOUS at 21:40

## 2021-10-12 RX ADMIN — IPRATROPIUM BROMIDE AND ALBUTEROL SULFATE 3 ML: 2.5; .5 SOLUTION RESPIRATORY (INHALATION) at 19:26

## 2021-10-12 RX ADMIN — INSULIN DETEMIR 30 UNITS: 100 INJECTION, SOLUTION SUBCUTANEOUS at 21:38

## 2021-10-12 RX ADMIN — LEVOTHYROXINE SODIUM 25 MCG: 25 TABLET ORAL at 05:57

## 2021-10-12 RX ADMIN — NYSTATIN 1 APPLICATION: 100000 POWDER TOPICAL at 21:54

## 2021-10-12 RX ADMIN — GABAPENTIN 800 MG: 400 CAPSULE ORAL at 21:38

## 2021-10-12 RX ADMIN — SODIUM CHLORIDE, PRESERVATIVE FREE 10 ML: 5 INJECTION INTRAVENOUS at 21:39

## 2021-10-12 RX ADMIN — MONTELUKAST 10 MG: 10 TABLET, FILM COATED ORAL at 21:38

## 2021-10-12 RX ADMIN — PERFLUTREN 1.5 ML: 6.52 INJECTION, SUSPENSION INTRAVENOUS at 09:08

## 2021-10-12 RX ADMIN — IPRATROPIUM BROMIDE AND ALBUTEROL SULFATE 3 ML: 2.5; .5 SOLUTION RESPIRATORY (INHALATION) at 16:12

## 2021-10-12 RX ADMIN — BUMETANIDE 2 MG: 0.25 INJECTION INTRAMUSCULAR; INTRAVENOUS at 17:59

## 2021-10-12 RX ADMIN — INSULIN ASPART 8 UNITS: 100 INJECTION, SOLUTION INTRAVENOUS; SUBCUTANEOUS at 18:00

## 2021-10-12 RX ADMIN — IPRATROPIUM BROMIDE AND ALBUTEROL SULFATE 3 ML: 2.5; .5 SOLUTION RESPIRATORY (INHALATION) at 07:55

## 2021-10-12 RX ADMIN — NYSTATIN: 100000 POWDER TOPICAL at 18:01

## 2021-10-12 RX ADMIN — IPRATROPIUM BROMIDE AND ALBUTEROL SULFATE 3 ML: 2.5; .5 SOLUTION RESPIRATORY (INHALATION) at 11:50

## 2021-10-12 RX ADMIN — CYANOCOBALAMIN TAB 1000 MCG 1000 MCG: 1000 TAB at 17:58

## 2021-10-12 RX ADMIN — GABAPENTIN 800 MG: 400 CAPSULE ORAL at 17:58

## 2021-10-12 RX ADMIN — Medication 333 MG: at 14:15

## 2021-10-13 LAB
ALBUMIN SERPL-MCNC: 3 G/DL (ref 3.5–5.2)
ALBUMIN/GLOB SERPL: 0.8 G/DL
ALP SERPL-CCNC: 153 U/L (ref 39–117)
ALT SERPL W P-5'-P-CCNC: 8 U/L (ref 1–33)
ANION GAP SERPL CALCULATED.3IONS-SCNC: 14 MMOL/L (ref 5–15)
ANISOCYTOSIS BLD QL: ABNORMAL
AST SERPL-CCNC: 12 U/L (ref 1–32)
BACTERIA BLD CULT: NORMAL
BASOPHILS # BLD MANUAL: 0.09 10*3/MM3 (ref 0–0.2)
BASOPHILS NFR BLD AUTO: 1 % (ref 0–1.5)
BH BB BLOOD EXPIRATION DATE: NORMAL
BH BB BLOOD EXPIRATION DATE: NORMAL
BH BB BLOOD TYPE BARCODE: 5100
BH BB BLOOD TYPE BARCODE: 5100
BH BB DISPENSE STATUS: NORMAL
BH BB DISPENSE STATUS: NORMAL
BH BB PRODUCT CODE: NORMAL
BH BB PRODUCT CODE: NORMAL
BH BB UNIT NUMBER: NORMAL
BH BB UNIT NUMBER: NORMAL
BILIRUB SERPL-MCNC: 0.2 MG/DL (ref 0–1.2)
BUN SERPL-MCNC: 50 MG/DL (ref 8–23)
BUN/CREAT SERPL: 19.5 (ref 7–25)
CALCIUM SPEC-SCNC: 8.7 MG/DL (ref 8.6–10.5)
CHLORIDE SERPL-SCNC: 99 MMOL/L (ref 98–107)
CO2 SERPL-SCNC: 22 MMOL/L (ref 22–29)
CREAT SERPL-MCNC: 2.56 MG/DL (ref 0.57–1)
CROSSMATCH INTERPRETATION: NORMAL
CROSSMATCH INTERPRETATION: NORMAL
DEPRECATED RDW RBC AUTO: 51.7 FL (ref 37–54)
EOSINOPHIL # BLD MANUAL: 0.47 10*3/MM3 (ref 0–0.4)
EOSINOPHIL NFR BLD MANUAL: 5 % (ref 0.3–6.2)
ERYTHROCYTE [DISTWIDTH] IN BLOOD BY AUTOMATED COUNT: 16.7 % (ref 12.3–15.4)
GFR SERPL CREATININE-BSD FRML MDRD: 19 ML/MIN/1.73
GLOBULIN UR ELPH-MCNC: 3.7 GM/DL
GLUCOSE BLDC GLUCOMTR-MCNC: 168 MG/DL (ref 70–130)
GLUCOSE BLDC GLUCOMTR-MCNC: 301 MG/DL (ref 70–130)
GLUCOSE BLDC GLUCOMTR-MCNC: 307 MG/DL (ref 70–130)
GLUCOSE BLDC GLUCOMTR-MCNC: 323 MG/DL (ref 70–130)
GLUCOSE BLDC GLUCOMTR-MCNC: 330 MG/DL (ref 70–130)
GLUCOSE SERPL-MCNC: 311 MG/DL (ref 65–99)
HCT VFR BLD AUTO: 26.8 % (ref 34–46.6)
HGB BLD-MCNC: 8.4 G/DL (ref 12–15.9)
LYMPHOCYTES # BLD MANUAL: 3.3 10*3/MM3 (ref 0.7–3.1)
LYMPHOCYTES NFR BLD MANUAL: 35 % (ref 19.6–45.3)
LYMPHOCYTES NFR BLD MANUAL: 5 % (ref 5–12)
MCH RBC QN AUTO: 27.5 PG (ref 26.6–33)
MCHC RBC AUTO-ENTMCNC: 31.3 G/DL (ref 31.5–35.7)
MCV RBC AUTO: 87.6 FL (ref 79–97)
METAMYELOCYTES NFR BLD MANUAL: 1 % (ref 0–0)
MONOCYTES # BLD AUTO: 0.47 10*3/MM3 (ref 0.1–0.9)
MYELOCYTES NFR BLD MANUAL: 2 % (ref 0–0)
NEUTROPHILS # BLD AUTO: 4.8 10*3/MM3 (ref 1.7–7)
NEUTROPHILS NFR BLD MANUAL: 47 % (ref 42.7–76)
NEUTS BAND NFR BLD MANUAL: 4 % (ref 0–5)
PLATELET # BLD AUTO: 297 10*3/MM3 (ref 140–450)
PMV BLD AUTO: 8.9 FL (ref 6–12)
POTASSIUM SERPL-SCNC: 4.5 MMOL/L (ref 3.5–5.2)
PROT SERPL-MCNC: 6.7 G/DL (ref 6–8.5)
RBC # BLD AUTO: 3.06 10*6/MM3 (ref 3.77–5.28)
SMALL PLATELETS BLD QL SMEAR: ADEQUATE
SODIUM SERPL-SCNC: 135 MMOL/L (ref 136–145)
UNIT  ABO: NORMAL
UNIT  ABO: NORMAL
UNIT  RH: NORMAL
UNIT  RH: NORMAL
WBC # BLD AUTO: 9.42 10*3/MM3 (ref 3.4–10.8)
WBC MORPH BLD: NORMAL

## 2021-10-13 PROCEDURE — 99232 SBSQ HOSP IP/OBS MODERATE 35: CPT | Performed by: INTERNAL MEDICINE

## 2021-10-13 PROCEDURE — 25010000002 EPOETIN ALFA-EPBX 10000 UNIT/ML SOLUTION: Performed by: INTERNAL MEDICINE

## 2021-10-13 PROCEDURE — 82962 GLUCOSE BLOOD TEST: CPT

## 2021-10-13 PROCEDURE — 85025 COMPLETE CBC W/AUTO DIFF WBC: CPT | Performed by: STUDENT IN AN ORGANIZED HEALTH CARE EDUCATION/TRAINING PROGRAM

## 2021-10-13 PROCEDURE — 80053 COMPREHEN METABOLIC PANEL: CPT | Performed by: STUDENT IN AN ORGANIZED HEALTH CARE EDUCATION/TRAINING PROGRAM

## 2021-10-13 PROCEDURE — 94799 UNLISTED PULMONARY SVC/PX: CPT

## 2021-10-13 PROCEDURE — 63710000001 INSULIN ASPART PER 5 UNITS: Performed by: STUDENT IN AN ORGANIZED HEALTH CARE EDUCATION/TRAINING PROGRAM

## 2021-10-13 PROCEDURE — 94640 AIRWAY INHALATION TREATMENT: CPT

## 2021-10-13 PROCEDURE — 85007 BL SMEAR W/DIFF WBC COUNT: CPT | Performed by: STUDENT IN AN ORGANIZED HEALTH CARE EDUCATION/TRAINING PROGRAM

## 2021-10-13 PROCEDURE — 63710000001 INSULIN DETEMIR PER 5 UNITS: Performed by: STUDENT IN AN ORGANIZED HEALTH CARE EDUCATION/TRAINING PROGRAM

## 2021-10-13 RX ORDER — PANTOPRAZOLE SODIUM 40 MG/10ML
40 INJECTION, POWDER, LYOPHILIZED, FOR SOLUTION INTRAVENOUS EVERY 12 HOURS SCHEDULED
Status: DISCONTINUED | OUTPATIENT
Start: 2021-10-13 | End: 2021-10-19

## 2021-10-13 RX ORDER — BUMETANIDE 0.25 MG/ML
2 INJECTION INTRAMUSCULAR; INTRAVENOUS ONCE
Status: COMPLETED | OUTPATIENT
Start: 2021-10-13 | End: 2021-10-13

## 2021-10-13 RX ADMIN — SODIUM CHLORIDE, PRESERVATIVE FREE 10 ML: 5 INJECTION INTRAVENOUS at 20:28

## 2021-10-13 RX ADMIN — BUMETANIDE 2 MG: 0.25 INJECTION INTRAMUSCULAR; INTRAVENOUS at 12:35

## 2021-10-13 RX ADMIN — EPOETIN ALFA-EPBX 10000 UNITS: 10000 INJECTION, SOLUTION INTRAVENOUS; SUBCUTANEOUS at 12:34

## 2021-10-13 RX ADMIN — INSULIN DETEMIR 30 UNITS: 100 INJECTION, SOLUTION SUBCUTANEOUS at 20:30

## 2021-10-13 RX ADMIN — SODIUM CHLORIDE, PRESERVATIVE FREE 10 ML: 5 INJECTION INTRAVENOUS at 09:45

## 2021-10-13 RX ADMIN — LEVOTHYROXINE SODIUM 25 MCG: 25 TABLET ORAL at 06:00

## 2021-10-13 RX ADMIN — INSULIN ASPART 4 UNITS: 100 INJECTION, SOLUTION INTRAVENOUS; SUBCUTANEOUS at 07:49

## 2021-10-13 RX ADMIN — HYDROCODONE BITARTRATE AND ACETAMINOPHEN 1 TABLET: 5; 325 TABLET ORAL at 21:23

## 2021-10-13 RX ADMIN — INSULIN ASPART 16 UNITS: 100 INJECTION, SOLUTION INTRAVENOUS; SUBCUTANEOUS at 17:04

## 2021-10-13 RX ADMIN — GABAPENTIN 800 MG: 400 CAPSULE ORAL at 09:40

## 2021-10-13 RX ADMIN — SODIUM CHLORIDE, PRESERVATIVE FREE 10 ML: 5 INJECTION INTRAVENOUS at 09:44

## 2021-10-13 RX ADMIN — ATORVASTATIN CALCIUM 20 MG: 20 TABLET, FILM COATED ORAL at 09:40

## 2021-10-13 RX ADMIN — INSULIN ASPART 20 UNITS: 100 INJECTION, SOLUTION INTRAVENOUS; SUBCUTANEOUS at 17:31

## 2021-10-13 RX ADMIN — METOPROLOL TARTRATE 50 MG: 50 TABLET, FILM COATED ORAL at 09:41

## 2021-10-13 RX ADMIN — PANTOPRAZOLE SODIUM 40 MG: 40 INJECTION, POWDER, FOR SOLUTION INTRAVENOUS at 20:27

## 2021-10-13 RX ADMIN — LISINOPRIL 10 MG: 5 TABLET ORAL at 09:41

## 2021-10-13 RX ADMIN — SODIUM CHLORIDE, PRESERVATIVE FREE 10 ML: 5 INJECTION INTRAVENOUS at 09:43

## 2021-10-13 RX ADMIN — NYSTATIN: 100000 POWDER TOPICAL at 17:32

## 2021-10-13 RX ADMIN — IPRATROPIUM BROMIDE AND ALBUTEROL SULFATE 3 ML: 2.5; .5 SOLUTION RESPIRATORY (INHALATION) at 05:04

## 2021-10-13 RX ADMIN — DULOXETINE HYDROCHLORIDE 20 MG: 20 CAPSULE, DELAYED RELEASE ORAL at 09:41

## 2021-10-13 RX ADMIN — SODIUM CHLORIDE, PRESERVATIVE FREE 10 ML: 5 INJECTION INTRAVENOUS at 09:42

## 2021-10-13 RX ADMIN — OXYBUTYNIN CHLORIDE 5 MG: 5 TABLET, EXTENDED RELEASE ORAL at 09:41

## 2021-10-13 RX ADMIN — GABAPENTIN 800 MG: 400 CAPSULE ORAL at 15:54

## 2021-10-13 RX ADMIN — ISOSORBIDE MONONITRATE 30 MG: 30 TABLET, EXTENDED RELEASE ORAL at 06:00

## 2021-10-13 RX ADMIN — CETIRIZINE HYDROCHLORIDE 10 MG: 10 TABLET, FILM COATED ORAL at 09:41

## 2021-10-13 RX ADMIN — SODIUM CHLORIDE, PRESERVATIVE FREE 10 ML: 5 INJECTION INTRAVENOUS at 20:30

## 2021-10-13 RX ADMIN — INSULIN ASPART 16 UNITS: 100 INJECTION, SOLUTION INTRAVENOUS; SUBCUTANEOUS at 12:36

## 2021-10-13 RX ADMIN — IPRATROPIUM BROMIDE AND ALBUTEROL SULFATE 3 ML: 2.5; .5 SOLUTION RESPIRATORY (INHALATION) at 19:42

## 2021-10-13 RX ADMIN — SODIUM CHLORIDE, PRESERVATIVE FREE 10 ML: 5 INJECTION INTRAVENOUS at 20:29

## 2021-10-13 RX ADMIN — GABAPENTIN 800 MG: 400 CAPSULE ORAL at 20:27

## 2021-10-13 RX ADMIN — FERROUS SULFATE TAB EC 324 MG (65 MG FE EQUIVALENT) 324 MG: 324 (65 FE) TABLET DELAYED RESPONSE at 09:40

## 2021-10-13 RX ADMIN — MONTELUKAST 10 MG: 10 TABLET, FILM COATED ORAL at 20:27

## 2021-10-13 RX ADMIN — METOPROLOL TARTRATE 50 MG: 50 TABLET, FILM COATED ORAL at 20:27

## 2021-10-13 RX ADMIN — CYANOCOBALAMIN TAB 1000 MCG 1000 MCG: 1000 TAB at 09:40

## 2021-10-13 RX ADMIN — PANTOPRAZOLE SODIUM 40 MG: 40 INJECTION, POWDER, FOR SOLUTION INTRAVENOUS at 09:40

## 2021-10-13 RX ADMIN — INSULIN ASPART 20 UNITS: 100 INJECTION, SOLUTION INTRAVENOUS; SUBCUTANEOUS at 12:33

## 2021-10-13 RX ADMIN — HYDROCODONE BITARTRATE AND ACETAMINOPHEN 1 TABLET: 5; 325 TABLET ORAL at 12:36

## 2021-10-13 RX ADMIN — IPRATROPIUM BROMIDE AND ALBUTEROL SULFATE 3 ML: 2.5; .5 SOLUTION RESPIRATORY (INHALATION) at 10:55

## 2021-10-13 RX ADMIN — INSULIN ASPART 20 UNITS: 100 INJECTION, SOLUTION INTRAVENOUS; SUBCUTANEOUS at 07:50

## 2021-10-14 VITALS
OXYGEN SATURATION: 97 % | SYSTOLIC BLOOD PRESSURE: 122 MMHG | RESPIRATION RATE: 18 BRPM | HEART RATE: 64 BPM | DIASTOLIC BLOOD PRESSURE: 64 MMHG

## 2021-10-14 LAB
ALBUMIN SERPL-MCNC: 2.9 G/DL (ref 3.5–5.2)
ALBUMIN/GLOB SERPL: 0.6 G/DL
ALP SERPL-CCNC: 141 U/L (ref 39–117)
ALT SERPL W P-5'-P-CCNC: 9 U/L (ref 1–33)
ANION GAP SERPL CALCULATED.3IONS-SCNC: 11 MMOL/L (ref 5–15)
AST SERPL-CCNC: 16 U/L (ref 1–32)
BACTERIA SPEC AEROBE CULT: ABNORMAL
BASOPHILS # BLD AUTO: 0.11 10*3/MM3 (ref 0–0.2)
BASOPHILS NFR BLD AUTO: 1.2 % (ref 0–1.5)
BILIRUB SERPL-MCNC: 0.2 MG/DL (ref 0–1.2)
BUN SERPL-MCNC: 53 MG/DL (ref 8–23)
BUN/CREAT SERPL: 19.9 (ref 7–25)
CALCIUM SPEC-SCNC: 8.9 MG/DL (ref 8.6–10.5)
CHLORIDE SERPL-SCNC: 98 MMOL/L (ref 98–107)
CO2 SERPL-SCNC: 23 MMOL/L (ref 22–29)
CREAT SERPL-MCNC: 2.66 MG/DL (ref 0.57–1)
CREAT UR-MCNC: 29.5 MG/DL
DEPRECATED RDW RBC AUTO: 49.6 FL (ref 37–54)
EOSINOPHIL # BLD AUTO: 0.38 10*3/MM3 (ref 0–0.4)
EOSINOPHIL NFR BLD AUTO: 4.3 % (ref 0.3–6.2)
ERYTHROCYTE [DISTWIDTH] IN BLOOD BY AUTOMATED COUNT: 16.1 % (ref 12.3–15.4)
GFR SERPL CREATININE-BSD FRML MDRD: 18 ML/MIN/1.73
GLOBULIN UR ELPH-MCNC: 4.6 GM/DL
GLUCOSE BLDC GLUCOMTR-MCNC: 118 MG/DL (ref 70–130)
GLUCOSE BLDC GLUCOMTR-MCNC: 241 MG/DL (ref 70–130)
GLUCOSE BLDC GLUCOMTR-MCNC: 265 MG/DL (ref 70–130)
GLUCOSE BLDC GLUCOMTR-MCNC: 275 MG/DL (ref 70–130)
GLUCOSE BLDC GLUCOMTR-MCNC: 351 MG/DL (ref 70–130)
GLUCOSE SERPL-MCNC: 255 MG/DL (ref 65–99)
GRAM STN SPEC: ABNORMAL
HCT VFR BLD AUTO: 27.4 % (ref 34–46.6)
HGB BLD-MCNC: 8.6 G/DL (ref 12–15.9)
IMM GRANULOCYTES # BLD AUTO: 0.31 10*3/MM3 (ref 0–0.05)
IMM GRANULOCYTES NFR BLD AUTO: 3.5 % (ref 0–0.5)
LYMPHOCYTES # BLD AUTO: 2.34 10*3/MM3 (ref 0.7–3.1)
LYMPHOCYTES NFR BLD AUTO: 26.5 % (ref 19.6–45.3)
MCH RBC QN AUTO: 26.9 PG (ref 26.6–33)
MCHC RBC AUTO-ENTMCNC: 31.4 G/DL (ref 31.5–35.7)
MCV RBC AUTO: 85.6 FL (ref 79–97)
MONOCYTES # BLD AUTO: 0.66 10*3/MM3 (ref 0.1–0.9)
MONOCYTES NFR BLD AUTO: 7.5 % (ref 5–12)
NEUTROPHILS NFR BLD AUTO: 5.03 10*3/MM3 (ref 1.7–7)
NEUTROPHILS NFR BLD AUTO: 57 % (ref 42.7–76)
NRBC BLD AUTO-RTO: 0 /100 WBC (ref 0–0.2)
PLATELET # BLD AUTO: 280 10*3/MM3 (ref 140–450)
PMV BLD AUTO: 8.7 FL (ref 6–12)
POTASSIUM SERPL-SCNC: 4.5 MMOL/L (ref 3.5–5.2)
PROT SERPL-MCNC: 7.5 G/DL (ref 6–8.5)
PROT UR-MCNC: 410 MG/DL
PROT/CREAT UR: ABNORMAL MG/G CREA (ref 0–200)
RBC # BLD AUTO: 3.2 10*6/MM3 (ref 3.77–5.28)
SODIUM SERPL-SCNC: 132 MMOL/L (ref 136–145)
WBC # BLD AUTO: 8.83 10*3/MM3 (ref 3.4–10.8)

## 2021-10-14 PROCEDURE — 85025 COMPLETE CBC W/AUTO DIFF WBC: CPT | Performed by: STUDENT IN AN ORGANIZED HEALTH CARE EDUCATION/TRAINING PROGRAM

## 2021-10-14 PROCEDURE — 84156 ASSAY OF PROTEIN URINE: CPT | Performed by: INTERNAL MEDICINE

## 2021-10-14 PROCEDURE — 82962 GLUCOSE BLOOD TEST: CPT

## 2021-10-14 PROCEDURE — 94799 UNLISTED PULMONARY SVC/PX: CPT

## 2021-10-14 PROCEDURE — 82570 ASSAY OF URINE CREATININE: CPT | Performed by: INTERNAL MEDICINE

## 2021-10-14 PROCEDURE — 63710000001 INSULIN ASPART PER 5 UNITS: Performed by: STUDENT IN AN ORGANIZED HEALTH CARE EDUCATION/TRAINING PROGRAM

## 2021-10-14 PROCEDURE — 80053 COMPREHEN METABOLIC PANEL: CPT | Performed by: STUDENT IN AN ORGANIZED HEALTH CARE EDUCATION/TRAINING PROGRAM

## 2021-10-14 PROCEDURE — 63710000001 INSULIN DETEMIR PER 5 UNITS: Performed by: STUDENT IN AN ORGANIZED HEALTH CARE EDUCATION/TRAINING PROGRAM

## 2021-10-14 PROCEDURE — 87040 BLOOD CULTURE FOR BACTERIA: CPT | Performed by: STUDENT IN AN ORGANIZED HEALTH CARE EDUCATION/TRAINING PROGRAM

## 2021-10-14 PROCEDURE — 36415 COLL VENOUS BLD VENIPUNCTURE: CPT | Performed by: STUDENT IN AN ORGANIZED HEALTH CARE EDUCATION/TRAINING PROGRAM

## 2021-10-14 PROCEDURE — 94640 AIRWAY INHALATION TREATMENT: CPT

## 2021-10-14 RX ORDER — BUMETANIDE 0.25 MG/ML
2 INJECTION INTRAMUSCULAR; INTRAVENOUS DAILY
Status: DISCONTINUED | OUTPATIENT
Start: 2021-10-14 | End: 2021-10-15

## 2021-10-14 RX ORDER — LIDOCAINE 50 MG/G
1 PATCH TOPICAL
Status: DISCONTINUED | OUTPATIENT
Start: 2021-10-14 | End: 2021-10-28 | Stop reason: HOSPADM

## 2021-10-14 RX ADMIN — LIDOCAINE 1 PATCH: 50 PATCH CUTANEOUS at 09:35

## 2021-10-14 RX ADMIN — SODIUM CHLORIDE, PRESERVATIVE FREE 10 ML: 5 INJECTION INTRAVENOUS at 09:36

## 2021-10-14 RX ADMIN — DULOXETINE HYDROCHLORIDE 20 MG: 20 CAPSULE, DELAYED RELEASE ORAL at 09:35

## 2021-10-14 RX ADMIN — SODIUM CHLORIDE, PRESERVATIVE FREE 10 ML: 5 INJECTION INTRAVENOUS at 09:35

## 2021-10-14 RX ADMIN — INSULIN DETEMIR 30 UNITS: 100 INJECTION, SOLUTION SUBCUTANEOUS at 21:08

## 2021-10-14 RX ADMIN — PANTOPRAZOLE SODIUM 40 MG: 40 INJECTION, POWDER, FOR SOLUTION INTRAVENOUS at 21:05

## 2021-10-14 RX ADMIN — GABAPENTIN 800 MG: 400 CAPSULE ORAL at 09:34

## 2021-10-14 RX ADMIN — IPRATROPIUM BROMIDE AND ALBUTEROL SULFATE 3 ML: 2.5; .5 SOLUTION RESPIRATORY (INHALATION) at 15:28

## 2021-10-14 RX ADMIN — NYSTATIN: 100000 POWDER TOPICAL at 18:03

## 2021-10-14 RX ADMIN — PANTOPRAZOLE SODIUM 40 MG: 40 INJECTION, POWDER, FOR SOLUTION INTRAVENOUS at 09:35

## 2021-10-14 RX ADMIN — MONTELUKAST 10 MG: 10 TABLET, FILM COATED ORAL at 21:05

## 2021-10-14 RX ADMIN — METOPROLOL TARTRATE 50 MG: 50 TABLET, FILM COATED ORAL at 21:04

## 2021-10-14 RX ADMIN — ISOSORBIDE MONONITRATE 30 MG: 30 TABLET, EXTENDED RELEASE ORAL at 06:32

## 2021-10-14 RX ADMIN — INSULIN ASPART 20 UNITS: 100 INJECTION, SOLUTION INTRAVENOUS; SUBCUTANEOUS at 12:03

## 2021-10-14 RX ADMIN — IPRATROPIUM BROMIDE AND ALBUTEROL SULFATE 3 ML: 2.5; .5 SOLUTION RESPIRATORY (INHALATION) at 21:32

## 2021-10-14 RX ADMIN — CETIRIZINE HYDROCHLORIDE 10 MG: 10 TABLET, FILM COATED ORAL at 09:34

## 2021-10-14 RX ADMIN — SODIUM CHLORIDE, PRESERVATIVE FREE 10 ML: 5 INJECTION INTRAVENOUS at 21:07

## 2021-10-14 RX ADMIN — METOPROLOL TARTRATE 50 MG: 50 TABLET, FILM COATED ORAL at 09:34

## 2021-10-14 RX ADMIN — SODIUM CHLORIDE, PRESERVATIVE FREE 10 ML: 5 INJECTION INTRAVENOUS at 09:38

## 2021-10-14 RX ADMIN — CYANOCOBALAMIN TAB 1000 MCG 1000 MCG: 1000 TAB at 09:34

## 2021-10-14 RX ADMIN — INSULIN ASPART 20 UNITS: 100 INJECTION, SOLUTION INTRAVENOUS; SUBCUTANEOUS at 09:37

## 2021-10-14 RX ADMIN — BUMETANIDE 2 MG: 0.25 INJECTION INTRAMUSCULAR; INTRAVENOUS at 14:08

## 2021-10-14 RX ADMIN — HYDROCODONE BITARTRATE AND ACETAMINOPHEN 1 TABLET: 5; 325 TABLET ORAL at 18:03

## 2021-10-14 RX ADMIN — ATORVASTATIN CALCIUM 20 MG: 20 TABLET, FILM COATED ORAL at 09:35

## 2021-10-14 RX ADMIN — INSULIN ASPART 12 UNITS: 100 INJECTION, SOLUTION INTRAVENOUS; SUBCUTANEOUS at 09:37

## 2021-10-14 RX ADMIN — FERROUS SULFATE TAB EC 324 MG (65 MG FE EQUIVALENT) 324 MG: 324 (65 FE) TABLET DELAYED RESPONSE at 09:34

## 2021-10-14 RX ADMIN — INSULIN ASPART 20 UNITS: 100 INJECTION, SOLUTION INTRAVENOUS; SUBCUTANEOUS at 12:04

## 2021-10-14 RX ADMIN — SODIUM CHLORIDE, PRESERVATIVE FREE 10 ML: 5 INJECTION INTRAVENOUS at 21:06

## 2021-10-14 RX ADMIN — HYDROCODONE BITARTRATE AND ACETAMINOPHEN 1 TABLET: 5; 325 TABLET ORAL at 09:40

## 2021-10-14 RX ADMIN — SODIUM CHLORIDE, PRESERVATIVE FREE 10 ML: 5 INJECTION INTRAVENOUS at 21:05

## 2021-10-14 RX ADMIN — NYSTATIN: 100000 POWDER TOPICAL at 09:36

## 2021-10-14 RX ADMIN — IPRATROPIUM BROMIDE AND ALBUTEROL SULFATE 3 ML: 2.5; .5 SOLUTION RESPIRATORY (INHALATION) at 07:32

## 2021-10-14 RX ADMIN — NYSTATIN: 100000 POWDER TOPICAL at 21:05

## 2021-10-14 RX ADMIN — GABAPENTIN 800 MG: 400 CAPSULE ORAL at 18:03

## 2021-10-14 RX ADMIN — OXYBUTYNIN CHLORIDE 5 MG: 5 TABLET, EXTENDED RELEASE ORAL at 09:34

## 2021-10-14 RX ADMIN — GABAPENTIN 800 MG: 400 CAPSULE ORAL at 21:04

## 2021-10-14 RX ADMIN — LISINOPRIL 10 MG: 5 TABLET ORAL at 09:34

## 2021-10-14 RX ADMIN — LEVOTHYROXINE SODIUM 25 MCG: 25 TABLET ORAL at 06:31

## 2021-10-15 ENCOUNTER — APPOINTMENT (OUTPATIENT)
Dept: GENERAL RADIOLOGY | Facility: HOSPITAL | Age: 61
End: 2021-10-15

## 2021-10-15 PROBLEM — R79.89 ELEVATED TROPONIN: Status: RESOLVED | Noted: 2021-10-12 | Resolved: 2021-10-15

## 2021-10-15 PROBLEM — R77.8 ELEVATED TROPONIN: Status: RESOLVED | Noted: 2021-10-12 | Resolved: 2021-10-15

## 2021-10-15 LAB
ALBUMIN SERPL-MCNC: 3.1 G/DL (ref 3.5–5.2)
ALBUMIN/GLOB SERPL: 0.8 G/DL
ALP SERPL-CCNC: 134 U/L (ref 39–117)
ALT SERPL W P-5'-P-CCNC: 9 U/L (ref 1–33)
ANION GAP SERPL CALCULATED.3IONS-SCNC: 13 MMOL/L (ref 5–15)
AST SERPL-CCNC: 14 U/L (ref 1–32)
BILIRUB SERPL-MCNC: 0.2 MG/DL (ref 0–1.2)
BUN SERPL-MCNC: 55 MG/DL (ref 8–23)
BUN/CREAT SERPL: 18.3 (ref 7–25)
CALCIUM SPEC-SCNC: 8.7 MG/DL (ref 8.6–10.5)
CHLORIDE SERPL-SCNC: 100 MMOL/L (ref 98–107)
CO2 SERPL-SCNC: 21 MMOL/L (ref 22–29)
CREAT SERPL-MCNC: 3.01 MG/DL (ref 0.57–1)
DEPRECATED RDW RBC AUTO: 50 FL (ref 37–54)
ERYTHROCYTE [DISTWIDTH] IN BLOOD BY AUTOMATED COUNT: 16 % (ref 12.3–15.4)
GFR SERPL CREATININE-BSD FRML MDRD: 16 ML/MIN/1.73
GLOBULIN UR ELPH-MCNC: 4 GM/DL
GLUCOSE BLDC GLUCOMTR-MCNC: 162 MG/DL (ref 70–130)
GLUCOSE BLDC GLUCOMTR-MCNC: 317 MG/DL (ref 70–130)
GLUCOSE BLDC GLUCOMTR-MCNC: 366 MG/DL (ref 70–130)
GLUCOSE SERPL-MCNC: 313 MG/DL (ref 65–99)
HCT VFR BLD AUTO: 26.4 % (ref 34–46.6)
HGB BLD-MCNC: 8.3 G/DL (ref 12–15.9)
HOLD SPECIMEN: NORMAL
MCH RBC QN AUTO: 27.4 PG (ref 26.6–33)
MCHC RBC AUTO-ENTMCNC: 31.4 G/DL (ref 31.5–35.7)
MCV RBC AUTO: 87.1 FL (ref 79–97)
PLATELET # BLD AUTO: 247 10*3/MM3 (ref 140–450)
PMV BLD AUTO: 8.8 FL (ref 6–12)
POTASSIUM SERPL-SCNC: 4.8 MMOL/L (ref 3.5–5.2)
PROT SERPL-MCNC: 7.1 G/DL (ref 6–8.5)
RBC # BLD AUTO: 3.03 10*6/MM3 (ref 3.77–5.28)
SODIUM SERPL-SCNC: 134 MMOL/L (ref 136–145)
WBC # BLD AUTO: 6.86 10*3/MM3 (ref 3.4–10.8)

## 2021-10-15 PROCEDURE — 63710000001 INSULIN DETEMIR PER 5 UNITS: Performed by: STUDENT IN AN ORGANIZED HEALTH CARE EDUCATION/TRAINING PROGRAM

## 2021-10-15 PROCEDURE — 94799 UNLISTED PULMONARY SVC/PX: CPT

## 2021-10-15 PROCEDURE — 82962 GLUCOSE BLOOD TEST: CPT

## 2021-10-15 PROCEDURE — 85027 COMPLETE CBC AUTOMATED: CPT | Performed by: STUDENT IN AN ORGANIZED HEALTH CARE EDUCATION/TRAINING PROGRAM

## 2021-10-15 PROCEDURE — 74018 RADEX ABDOMEN 1 VIEW: CPT

## 2021-10-15 PROCEDURE — 94760 N-INVAS EAR/PLS OXIMETRY 1: CPT

## 2021-10-15 PROCEDURE — 80053 COMPREHEN METABOLIC PANEL: CPT | Performed by: STUDENT IN AN ORGANIZED HEALTH CARE EDUCATION/TRAINING PROGRAM

## 2021-10-15 PROCEDURE — 99232 SBSQ HOSP IP/OBS MODERATE 35: CPT | Performed by: INTERNAL MEDICINE

## 2021-10-15 PROCEDURE — 63710000001 INSULIN ASPART PER 5 UNITS: Performed by: STUDENT IN AN ORGANIZED HEALTH CARE EDUCATION/TRAINING PROGRAM

## 2021-10-15 RX ORDER — GABAPENTIN 400 MG/1
400 CAPSULE ORAL 3 TIMES DAILY
Status: DISCONTINUED | OUTPATIENT
Start: 2021-10-15 | End: 2021-10-28 | Stop reason: HOSPADM

## 2021-10-15 RX ORDER — HYDRALAZINE HYDROCHLORIDE 20 MG/ML
10 INJECTION INTRAMUSCULAR; INTRAVENOUS
Status: DISCONTINUED | OUTPATIENT
Start: 2021-10-15 | End: 2021-10-28 | Stop reason: HOSPADM

## 2021-10-15 RX ADMIN — MONTELUKAST 10 MG: 10 TABLET, FILM COATED ORAL at 20:40

## 2021-10-15 RX ADMIN — SODIUM CHLORIDE, PRESERVATIVE FREE 10 ML: 5 INJECTION INTRAVENOUS at 09:06

## 2021-10-15 RX ADMIN — METOPROLOL TARTRATE 50 MG: 50 TABLET, FILM COATED ORAL at 20:40

## 2021-10-15 RX ADMIN — INSULIN ASPART 20 UNITS: 100 INJECTION, SOLUTION INTRAVENOUS; SUBCUTANEOUS at 11:15

## 2021-10-15 RX ADMIN — INSULIN ASPART 20 UNITS: 100 INJECTION, SOLUTION INTRAVENOUS; SUBCUTANEOUS at 09:05

## 2021-10-15 RX ADMIN — CETIRIZINE HYDROCHLORIDE 10 MG: 10 TABLET, FILM COATED ORAL at 09:05

## 2021-10-15 RX ADMIN — INSULIN ASPART 16 UNITS: 100 INJECTION, SOLUTION INTRAVENOUS; SUBCUTANEOUS at 09:04

## 2021-10-15 RX ADMIN — INSULIN ASPART 20 UNITS: 100 INJECTION, SOLUTION INTRAVENOUS; SUBCUTANEOUS at 11:16

## 2021-10-15 RX ADMIN — GABAPENTIN 400 MG: 400 CAPSULE ORAL at 20:40

## 2021-10-15 RX ADMIN — LIDOCAINE 1 PATCH: 50 PATCH CUTANEOUS at 09:03

## 2021-10-15 RX ADMIN — GABAPENTIN 400 MG: 400 CAPSULE ORAL at 16:45

## 2021-10-15 RX ADMIN — NYSTATIN: 100000 POWDER TOPICAL at 09:05

## 2021-10-15 RX ADMIN — SODIUM CHLORIDE, PRESERVATIVE FREE 10 ML: 5 INJECTION INTRAVENOUS at 09:05

## 2021-10-15 RX ADMIN — LISINOPRIL 10 MG: 5 TABLET ORAL at 09:04

## 2021-10-15 RX ADMIN — ISOSORBIDE MONONITRATE 30 MG: 30 TABLET, EXTENDED RELEASE ORAL at 06:12

## 2021-10-15 RX ADMIN — INSULIN DETEMIR 15 UNITS: 100 INJECTION, SOLUTION SUBCUTANEOUS at 11:16

## 2021-10-15 RX ADMIN — PANTOPRAZOLE SODIUM 40 MG: 40 INJECTION, POWDER, FOR SOLUTION INTRAVENOUS at 20:40

## 2021-10-15 RX ADMIN — IPRATROPIUM BROMIDE AND ALBUTEROL SULFATE 3 ML: 2.5; .5 SOLUTION RESPIRATORY (INHALATION) at 19:54

## 2021-10-15 RX ADMIN — DULOXETINE HYDROCHLORIDE 20 MG: 20 CAPSULE, DELAYED RELEASE ORAL at 09:04

## 2021-10-15 RX ADMIN — LEVOTHYROXINE SODIUM 25 MCG: 25 TABLET ORAL at 06:12

## 2021-10-15 RX ADMIN — ATORVASTATIN CALCIUM 20 MG: 20 TABLET, FILM COATED ORAL at 09:04

## 2021-10-15 RX ADMIN — CANAGLIFLOZIN 100 MG: 100 TABLET, FILM COATED ORAL at 06:12

## 2021-10-15 RX ADMIN — GABAPENTIN 800 MG: 400 CAPSULE ORAL at 09:04

## 2021-10-15 RX ADMIN — PANTOPRAZOLE SODIUM 40 MG: 40 INJECTION, POWDER, FOR SOLUTION INTRAVENOUS at 09:03

## 2021-10-15 RX ADMIN — INSULIN DETEMIR 30 UNITS: 100 INJECTION, SOLUTION SUBCUTANEOUS at 20:40

## 2021-10-15 RX ADMIN — FERROUS SULFATE TAB EC 324 MG (65 MG FE EQUIVALENT) 324 MG: 324 (65 FE) TABLET DELAYED RESPONSE at 09:05

## 2021-10-15 RX ADMIN — CYANOCOBALAMIN TAB 1000 MCG 1000 MCG: 1000 TAB at 09:04

## 2021-10-15 RX ADMIN — INSULIN ASPART 20 UNITS: 100 INJECTION, SOLUTION INTRAVENOUS; SUBCUTANEOUS at 16:45

## 2021-10-15 RX ADMIN — BUMETANIDE 2 MG: 0.25 INJECTION INTRAMUSCULAR; INTRAVENOUS at 09:03

## 2021-10-15 RX ADMIN — OXYBUTYNIN CHLORIDE 5 MG: 5 TABLET, EXTENDED RELEASE ORAL at 09:04

## 2021-10-15 RX ADMIN — IPRATROPIUM BROMIDE AND ALBUTEROL SULFATE 3 ML: 2.5; .5 SOLUTION RESPIRATORY (INHALATION) at 11:57

## 2021-10-15 RX ADMIN — METOPROLOL TARTRATE 50 MG: 50 TABLET, FILM COATED ORAL at 09:04

## 2021-10-16 ENCOUNTER — APPOINTMENT (OUTPATIENT)
Dept: GENERAL RADIOLOGY | Facility: HOSPITAL | Age: 61
End: 2021-10-16

## 2021-10-16 LAB
ALBUMIN SERPL-MCNC: 3.1 G/DL (ref 3.5–5.2)
ALBUMIN/GLOB SERPL: 0.7 G/DL
ALP SERPL-CCNC: 128 U/L (ref 39–117)
ALT SERPL W P-5'-P-CCNC: 7 U/L (ref 1–33)
ANION GAP SERPL CALCULATED.3IONS-SCNC: 9 MMOL/L (ref 5–15)
AST SERPL-CCNC: 9 U/L (ref 1–32)
BACTERIA SPEC AEROBE CULT: NORMAL
BILIRUB SERPL-MCNC: 0.2 MG/DL (ref 0–1.2)
BUN SERPL-MCNC: 55 MG/DL (ref 8–23)
BUN/CREAT SERPL: 17.6 (ref 7–25)
CALCIUM SPEC-SCNC: 8.7 MG/DL (ref 8.6–10.5)
CHLORIDE SERPL-SCNC: 101 MMOL/L (ref 98–107)
CO2 SERPL-SCNC: 26 MMOL/L (ref 22–29)
CREAT SERPL-MCNC: 3.13 MG/DL (ref 0.57–1)
DEPRECATED RDW RBC AUTO: 49.3 FL (ref 37–54)
ERYTHROCYTE [DISTWIDTH] IN BLOOD BY AUTOMATED COUNT: 15.9 % (ref 12.3–15.4)
GFR SERPL CREATININE-BSD FRML MDRD: 15 ML/MIN/1.73
GLOBULIN UR ELPH-MCNC: 4.2 GM/DL
GLUCOSE BLDC GLUCOMTR-MCNC: 100 MG/DL (ref 70–130)
GLUCOSE BLDC GLUCOMTR-MCNC: 135 MG/DL (ref 70–130)
GLUCOSE BLDC GLUCOMTR-MCNC: 250 MG/DL (ref 70–130)
GLUCOSE SERPL-MCNC: 243 MG/DL (ref 65–99)
HCT VFR BLD AUTO: 26.6 % (ref 34–46.6)
HGB BLD-MCNC: 8.4 G/DL (ref 12–15.9)
MCH RBC QN AUTO: 27.3 PG (ref 26.6–33)
MCHC RBC AUTO-ENTMCNC: 31.6 G/DL (ref 31.5–35.7)
MCV RBC AUTO: 86.4 FL (ref 79–97)
PLATELET # BLD AUTO: 261 10*3/MM3 (ref 140–450)
PMV BLD AUTO: 8.7 FL (ref 6–12)
POTASSIUM SERPL-SCNC: 5 MMOL/L (ref 3.5–5.2)
PROT SERPL-MCNC: 7.3 G/DL (ref 6–8.5)
RBC # BLD AUTO: 3.08 10*6/MM3 (ref 3.77–5.28)
SODIUM SERPL-SCNC: 136 MMOL/L (ref 136–145)
WBC # BLD AUTO: 7.23 10*3/MM3 (ref 3.4–10.8)

## 2021-10-16 PROCEDURE — 80053 COMPREHEN METABOLIC PANEL: CPT | Performed by: STUDENT IN AN ORGANIZED HEALTH CARE EDUCATION/TRAINING PROGRAM

## 2021-10-16 PROCEDURE — 63710000001 INSULIN DETEMIR PER 5 UNITS: Performed by: STUDENT IN AN ORGANIZED HEALTH CARE EDUCATION/TRAINING PROGRAM

## 2021-10-16 PROCEDURE — 82962 GLUCOSE BLOOD TEST: CPT

## 2021-10-16 PROCEDURE — 94799 UNLISTED PULMONARY SVC/PX: CPT

## 2021-10-16 PROCEDURE — 36415 COLL VENOUS BLD VENIPUNCTURE: CPT | Performed by: STUDENT IN AN ORGANIZED HEALTH CARE EDUCATION/TRAINING PROGRAM

## 2021-10-16 PROCEDURE — 63710000001 INSULIN ASPART PER 5 UNITS: Performed by: STUDENT IN AN ORGANIZED HEALTH CARE EDUCATION/TRAINING PROGRAM

## 2021-10-16 PROCEDURE — 71045 X-RAY EXAM CHEST 1 VIEW: CPT

## 2021-10-16 PROCEDURE — 85027 COMPLETE CBC AUTOMATED: CPT | Performed by: STUDENT IN AN ORGANIZED HEALTH CARE EDUCATION/TRAINING PROGRAM

## 2021-10-16 PROCEDURE — 99232 SBSQ HOSP IP/OBS MODERATE 35: CPT | Performed by: INTERNAL MEDICINE

## 2021-10-16 PROCEDURE — 94760 N-INVAS EAR/PLS OXIMETRY 1: CPT

## 2021-10-16 RX ADMIN — INSULIN ASPART 20 UNITS: 100 INJECTION, SOLUTION INTRAVENOUS; SUBCUTANEOUS at 12:55

## 2021-10-16 RX ADMIN — INSULIN ASPART 24 UNITS: 100 INJECTION, SOLUTION INTRAVENOUS; SUBCUTANEOUS at 12:54

## 2021-10-16 RX ADMIN — IPRATROPIUM BROMIDE AND ALBUTEROL SULFATE 3 ML: 2.5; .5 SOLUTION RESPIRATORY (INHALATION) at 20:55

## 2021-10-16 RX ADMIN — PANTOPRAZOLE SODIUM 40 MG: 40 INJECTION, POWDER, FOR SOLUTION INTRAVENOUS at 08:34

## 2021-10-16 RX ADMIN — GABAPENTIN 400 MG: 400 CAPSULE ORAL at 17:11

## 2021-10-16 RX ADMIN — NYSTATIN: 100000 POWDER TOPICAL at 08:35

## 2021-10-16 RX ADMIN — HYDROCODONE BITARTRATE AND ACETAMINOPHEN 1 TABLET: 5; 325 TABLET ORAL at 21:31

## 2021-10-16 RX ADMIN — ATORVASTATIN CALCIUM 20 MG: 20 TABLET, FILM COATED ORAL at 08:34

## 2021-10-16 RX ADMIN — MONTELUKAST 10 MG: 10 TABLET, FILM COATED ORAL at 21:24

## 2021-10-16 RX ADMIN — METOPROLOL TARTRATE 50 MG: 50 TABLET, FILM COATED ORAL at 08:34

## 2021-10-16 RX ADMIN — METOPROLOL TARTRATE 50 MG: 50 TABLET, FILM COATED ORAL at 21:24

## 2021-10-16 RX ADMIN — INSULIN DETEMIR 15 UNITS: 100 INJECTION, SOLUTION SUBCUTANEOUS at 06:29

## 2021-10-16 RX ADMIN — INSULIN ASPART 20 UNITS: 100 INJECTION, SOLUTION INTRAVENOUS; SUBCUTANEOUS at 18:04

## 2021-10-16 RX ADMIN — SODIUM CHLORIDE, PRESERVATIVE FREE 10 ML: 5 INJECTION INTRAVENOUS at 21:24

## 2021-10-16 RX ADMIN — INSULIN ASPART 20 UNITS: 100 INJECTION, SOLUTION INTRAVENOUS; SUBCUTANEOUS at 08:35

## 2021-10-16 RX ADMIN — ISOSORBIDE MONONITRATE 30 MG: 30 TABLET, EXTENDED RELEASE ORAL at 06:29

## 2021-10-16 RX ADMIN — FERROUS SULFATE TAB EC 324 MG (65 MG FE EQUIVALENT) 324 MG: 324 (65 FE) TABLET DELAYED RESPONSE at 08:34

## 2021-10-16 RX ADMIN — INSULIN DETEMIR 30 UNITS: 100 INJECTION, SOLUTION SUBCUTANEOUS at 21:25

## 2021-10-16 RX ADMIN — GABAPENTIN 400 MG: 400 CAPSULE ORAL at 08:34

## 2021-10-16 RX ADMIN — OXYBUTYNIN CHLORIDE 5 MG: 5 TABLET, EXTENDED RELEASE ORAL at 08:34

## 2021-10-16 RX ADMIN — PANTOPRAZOLE SODIUM 40 MG: 40 INJECTION, POWDER, FOR SOLUTION INTRAVENOUS at 21:24

## 2021-10-16 RX ADMIN — DULOXETINE HYDROCHLORIDE 20 MG: 20 CAPSULE, DELAYED RELEASE ORAL at 08:34

## 2021-10-16 RX ADMIN — LIDOCAINE 1 PATCH: 50 PATCH CUTANEOUS at 08:34

## 2021-10-16 RX ADMIN — CETIRIZINE HYDROCHLORIDE 10 MG: 10 TABLET, FILM COATED ORAL at 08:34

## 2021-10-16 RX ADMIN — CYANOCOBALAMIN TAB 1000 MCG 1000 MCG: 1000 TAB at 08:34

## 2021-10-16 RX ADMIN — SODIUM CHLORIDE, PRESERVATIVE FREE 10 ML: 5 INJECTION INTRAVENOUS at 08:35

## 2021-10-16 RX ADMIN — HYDROCODONE BITARTRATE AND ACETAMINOPHEN 1 TABLET: 5; 325 TABLET ORAL at 06:34

## 2021-10-16 RX ADMIN — GABAPENTIN 400 MG: 400 CAPSULE ORAL at 21:24

## 2021-10-16 RX ADMIN — SODIUM CHLORIDE, PRESERVATIVE FREE 10 ML: 5 INJECTION INTRAVENOUS at 08:36

## 2021-10-16 RX ADMIN — INSULIN ASPART 8 UNITS: 100 INJECTION, SOLUTION INTRAVENOUS; SUBCUTANEOUS at 08:35

## 2021-10-16 RX ADMIN — IPRATROPIUM BROMIDE AND ALBUTEROL SULFATE 3 ML: 2.5; .5 SOLUTION RESPIRATORY (INHALATION) at 14:16

## 2021-10-16 RX ADMIN — LEVOTHYROXINE SODIUM 25 MCG: 25 TABLET ORAL at 06:29

## 2021-10-17 LAB
ALBUMIN SERPL-MCNC: 3.1 G/DL (ref 3.5–5.2)
ALBUMIN/GLOB SERPL: 0.8 G/DL
ALP SERPL-CCNC: 124 U/L (ref 39–117)
ALT SERPL W P-5'-P-CCNC: 8 U/L (ref 1–33)
ANION GAP SERPL CALCULATED.3IONS-SCNC: 11 MMOL/L (ref 5–15)
AST SERPL-CCNC: 11 U/L (ref 1–32)
BILIRUB SERPL-MCNC: 0.2 MG/DL (ref 0–1.2)
BUN SERPL-MCNC: 56 MG/DL (ref 8–23)
BUN/CREAT SERPL: 18.8 (ref 7–25)
CALCIUM SPEC-SCNC: 8.8 MG/DL (ref 8.6–10.5)
CHLORIDE SERPL-SCNC: 99 MMOL/L (ref 98–107)
CO2 SERPL-SCNC: 25 MMOL/L (ref 22–29)
CREAT SERPL-MCNC: 2.98 MG/DL (ref 0.57–1)
DEPRECATED RDW RBC AUTO: 50 FL (ref 37–54)
ERYTHROCYTE [DISTWIDTH] IN BLOOD BY AUTOMATED COUNT: 16 % (ref 12.3–15.4)
GFR SERPL CREATININE-BSD FRML MDRD: 16 ML/MIN/1.73
GLOBULIN UR ELPH-MCNC: 4 GM/DL
GLUCOSE BLDC GLUCOMTR-MCNC: 259 MG/DL (ref 70–130)
GLUCOSE BLDC GLUCOMTR-MCNC: 263 MG/DL (ref 70–130)
GLUCOSE BLDC GLUCOMTR-MCNC: 294 MG/DL (ref 70–130)
GLUCOSE BLDC GLUCOMTR-MCNC: 449 MG/DL (ref 70–130)
GLUCOSE SERPL-MCNC: 252 MG/DL (ref 65–99)
HCT VFR BLD AUTO: 27 % (ref 34–46.6)
HGB BLD-MCNC: 8.4 G/DL (ref 12–15.9)
MCH RBC QN AUTO: 27.3 PG (ref 26.6–33)
MCHC RBC AUTO-ENTMCNC: 31.1 G/DL (ref 31.5–35.7)
MCV RBC AUTO: 87.7 FL (ref 79–97)
PLATELET # BLD AUTO: 245 10*3/MM3 (ref 140–450)
PMV BLD AUTO: 8.5 FL (ref 6–12)
POTASSIUM SERPL-SCNC: 4.8 MMOL/L (ref 3.5–5.2)
PROT SERPL-MCNC: 7.1 G/DL (ref 6–8.5)
RBC # BLD AUTO: 3.08 10*6/MM3 (ref 3.77–5.28)
SODIUM SERPL-SCNC: 135 MMOL/L (ref 136–145)
WBC # BLD AUTO: 6.57 10*3/MM3 (ref 3.4–10.8)

## 2021-10-17 PROCEDURE — 63710000001 INSULIN ASPART PER 5 UNITS: Performed by: STUDENT IN AN ORGANIZED HEALTH CARE EDUCATION/TRAINING PROGRAM

## 2021-10-17 PROCEDURE — 63710000001 INSULIN DETEMIR PER 5 UNITS: Performed by: STUDENT IN AN ORGANIZED HEALTH CARE EDUCATION/TRAINING PROGRAM

## 2021-10-17 PROCEDURE — 85027 COMPLETE CBC AUTOMATED: CPT | Performed by: STUDENT IN AN ORGANIZED HEALTH CARE EDUCATION/TRAINING PROGRAM

## 2021-10-17 PROCEDURE — 80053 COMPREHEN METABOLIC PANEL: CPT | Performed by: STUDENT IN AN ORGANIZED HEALTH CARE EDUCATION/TRAINING PROGRAM

## 2021-10-17 PROCEDURE — 82962 GLUCOSE BLOOD TEST: CPT

## 2021-10-17 PROCEDURE — 99231 SBSQ HOSP IP/OBS SF/LOW 25: CPT | Performed by: INTERNAL MEDICINE

## 2021-10-17 PROCEDURE — 36415 COLL VENOUS BLD VENIPUNCTURE: CPT | Performed by: STUDENT IN AN ORGANIZED HEALTH CARE EDUCATION/TRAINING PROGRAM

## 2021-10-17 PROCEDURE — 94799 UNLISTED PULMONARY SVC/PX: CPT

## 2021-10-17 PROCEDURE — 94760 N-INVAS EAR/PLS OXIMETRY 1: CPT

## 2021-10-17 RX ORDER — BUMETANIDE 1 MG/1
2 TABLET ORAL DAILY
Status: DISCONTINUED | OUTPATIENT
Start: 2021-10-17 | End: 2021-10-19

## 2021-10-17 RX ADMIN — INSULIN DETEMIR 15 UNITS: 100 INJECTION, SOLUTION SUBCUTANEOUS at 06:25

## 2021-10-17 RX ADMIN — NYSTATIN: 100000 POWDER TOPICAL at 20:29

## 2021-10-17 RX ADMIN — GABAPENTIN 400 MG: 400 CAPSULE ORAL at 20:27

## 2021-10-17 RX ADMIN — SODIUM CHLORIDE, PRESERVATIVE FREE 10 ML: 5 INJECTION INTRAVENOUS at 08:26

## 2021-10-17 RX ADMIN — SODIUM CHLORIDE, PRESERVATIVE FREE 10 ML: 5 INJECTION INTRAVENOUS at 08:25

## 2021-10-17 RX ADMIN — INSULIN ASPART 12 UNITS: 100 INJECTION, SOLUTION INTRAVENOUS; SUBCUTANEOUS at 17:12

## 2021-10-17 RX ADMIN — INSULIN ASPART 12 UNITS: 100 INJECTION, SOLUTION INTRAVENOUS; SUBCUTANEOUS at 12:41

## 2021-10-17 RX ADMIN — SODIUM CHLORIDE, PRESERVATIVE FREE 10 ML: 5 INJECTION INTRAVENOUS at 20:28

## 2021-10-17 RX ADMIN — IPRATROPIUM BROMIDE AND ALBUTEROL SULFATE 3 ML: 2.5; .5 SOLUTION RESPIRATORY (INHALATION) at 20:42

## 2021-10-17 RX ADMIN — OXYBUTYNIN CHLORIDE 5 MG: 5 TABLET, EXTENDED RELEASE ORAL at 08:24

## 2021-10-17 RX ADMIN — MONTELUKAST 10 MG: 10 TABLET, FILM COATED ORAL at 20:27

## 2021-10-17 RX ADMIN — ISOSORBIDE MONONITRATE 30 MG: 30 TABLET, EXTENDED RELEASE ORAL at 06:25

## 2021-10-17 RX ADMIN — PANTOPRAZOLE SODIUM 40 MG: 40 INJECTION, POWDER, FOR SOLUTION INTRAVENOUS at 20:27

## 2021-10-17 RX ADMIN — FERROUS SULFATE TAB EC 324 MG (65 MG FE EQUIVALENT) 324 MG: 324 (65 FE) TABLET DELAYED RESPONSE at 08:24

## 2021-10-17 RX ADMIN — INSULIN ASPART 20 UNITS: 100 INJECTION, SOLUTION INTRAVENOUS; SUBCUTANEOUS at 12:42

## 2021-10-17 RX ADMIN — METOPROLOL TARTRATE 50 MG: 50 TABLET, FILM COATED ORAL at 20:27

## 2021-10-17 RX ADMIN — IPRATROPIUM BROMIDE AND ALBUTEROL SULFATE 3 ML: 2.5; .5 SOLUTION RESPIRATORY (INHALATION) at 10:46

## 2021-10-17 RX ADMIN — DULOXETINE HYDROCHLORIDE 20 MG: 20 CAPSULE, DELAYED RELEASE ORAL at 08:24

## 2021-10-17 RX ADMIN — CYANOCOBALAMIN TAB 1000 MCG 1000 MCG: 1000 TAB at 08:24

## 2021-10-17 RX ADMIN — BUMETANIDE 2 MG: 1 TABLET ORAL at 14:29

## 2021-10-17 RX ADMIN — GABAPENTIN 400 MG: 400 CAPSULE ORAL at 17:12

## 2021-10-17 RX ADMIN — HYDROCODONE BITARTRATE AND ACETAMINOPHEN 1 TABLET: 5; 325 TABLET ORAL at 23:40

## 2021-10-17 RX ADMIN — IPRATROPIUM BROMIDE AND ALBUTEROL SULFATE 3 ML: 2.5; .5 SOLUTION RESPIRATORY (INHALATION) at 14:50

## 2021-10-17 RX ADMIN — LEVOTHYROXINE SODIUM 25 MCG: 25 TABLET ORAL at 06:25

## 2021-10-17 RX ADMIN — INSULIN ASPART 12 UNITS: 100 INJECTION, SOLUTION INTRAVENOUS; SUBCUTANEOUS at 08:26

## 2021-10-17 RX ADMIN — NYSTATIN: 100000 POWDER TOPICAL at 17:13

## 2021-10-17 RX ADMIN — SODIUM CHLORIDE, PRESERVATIVE FREE 10 ML: 5 INJECTION INTRAVENOUS at 20:29

## 2021-10-17 RX ADMIN — INSULIN DETEMIR 30 UNITS: 100 INJECTION, SOLUTION SUBCUTANEOUS at 20:27

## 2021-10-17 RX ADMIN — PANTOPRAZOLE SODIUM 40 MG: 40 INJECTION, POWDER, FOR SOLUTION INTRAVENOUS at 08:24

## 2021-10-17 RX ADMIN — INSULIN ASPART 20 UNITS: 100 INJECTION, SOLUTION INTRAVENOUS; SUBCUTANEOUS at 17:12

## 2021-10-17 RX ADMIN — METOPROLOL TARTRATE 50 MG: 50 TABLET, FILM COATED ORAL at 08:24

## 2021-10-17 RX ADMIN — ATORVASTATIN CALCIUM 20 MG: 20 TABLET, FILM COATED ORAL at 08:24

## 2021-10-17 RX ADMIN — GABAPENTIN 400 MG: 400 CAPSULE ORAL at 08:24

## 2021-10-17 RX ADMIN — INSULIN ASPART 20 UNITS: 100 INJECTION, SOLUTION INTRAVENOUS; SUBCUTANEOUS at 22:29

## 2021-10-17 RX ADMIN — NYSTATIN: 100000 POWDER TOPICAL at 08:27

## 2021-10-17 RX ADMIN — LIDOCAINE 1 PATCH: 50 PATCH CUTANEOUS at 08:24

## 2021-10-17 RX ADMIN — INSULIN ASPART 20 UNITS: 100 INJECTION, SOLUTION INTRAVENOUS; SUBCUTANEOUS at 08:26

## 2021-10-17 RX ADMIN — CETIRIZINE HYDROCHLORIDE 10 MG: 10 TABLET, FILM COATED ORAL at 08:24

## 2021-10-18 PROBLEM — H92.01 EAR PAIN, RIGHT: Status: ACTIVE | Noted: 2021-10-18

## 2021-10-18 LAB
ALBUMIN SERPL-MCNC: 3.4 G/DL (ref 3.5–5.2)
ALBUMIN/GLOB SERPL: 0.8 G/DL
ALP SERPL-CCNC: 153 U/L (ref 39–117)
ALT SERPL W P-5'-P-CCNC: 9 U/L (ref 1–33)
ANION GAP SERPL CALCULATED.3IONS-SCNC: 10 MMOL/L (ref 5–15)
AST SERPL-CCNC: 11 U/L (ref 1–32)
BILIRUB SERPL-MCNC: 0.2 MG/DL (ref 0–1.2)
BUN SERPL-MCNC: 55 MG/DL (ref 8–23)
BUN/CREAT SERPL: 19.2 (ref 7–25)
CALCIUM SPEC-SCNC: 8.9 MG/DL (ref 8.6–10.5)
CHLORIDE SERPL-SCNC: 98 MMOL/L (ref 98–107)
CO2 SERPL-SCNC: 25 MMOL/L (ref 22–29)
CREAT SERPL-MCNC: 2.86 MG/DL (ref 0.57–1)
DEPRECATED RDW RBC AUTO: 48.8 FL (ref 37–54)
ERYTHROCYTE [DISTWIDTH] IN BLOOD BY AUTOMATED COUNT: 15.7 % (ref 12.3–15.4)
GFR SERPL CREATININE-BSD FRML MDRD: 17 ML/MIN/1.73
GLOBULIN UR ELPH-MCNC: 4.3 GM/DL
GLUCOSE BLDC GLUCOMTR-MCNC: 161 MG/DL (ref 70–130)
GLUCOSE BLDC GLUCOMTR-MCNC: 209 MG/DL (ref 70–130)
GLUCOSE BLDC GLUCOMTR-MCNC: 273 MG/DL (ref 70–130)
GLUCOSE BLDC GLUCOMTR-MCNC: 315 MG/DL (ref 70–130)
GLUCOSE BLDC GLUCOMTR-MCNC: 404 MG/DL (ref 70–130)
GLUCOSE BLDC GLUCOMTR-MCNC: 465 MG/DL (ref 70–130)
GLUCOSE SERPL-MCNC: 264 MG/DL (ref 65–99)
HCT VFR BLD AUTO: 29.3 % (ref 34–46.6)
HGB BLD-MCNC: 9.3 G/DL (ref 12–15.9)
MCH RBC QN AUTO: 27.6 PG (ref 26.6–33)
MCHC RBC AUTO-ENTMCNC: 31.7 G/DL (ref 31.5–35.7)
MCV RBC AUTO: 86.9 FL (ref 79–97)
PLATELET # BLD AUTO: 264 10*3/MM3 (ref 140–450)
PMV BLD AUTO: 8.8 FL (ref 6–12)
POTASSIUM SERPL-SCNC: 4.8 MMOL/L (ref 3.5–5.2)
PROT SERPL-MCNC: 7.7 G/DL (ref 6–8.5)
RBC # BLD AUTO: 3.37 10*6/MM3 (ref 3.77–5.28)
SODIUM SERPL-SCNC: 133 MMOL/L (ref 136–145)
WBC # BLD AUTO: 8.12 10*3/MM3 (ref 3.4–10.8)

## 2021-10-18 PROCEDURE — 25010000002 EPOETIN ALFA-EPBX 10000 UNIT/ML SOLUTION: Performed by: INTERNAL MEDICINE

## 2021-10-18 PROCEDURE — 94799 UNLISTED PULMONARY SVC/PX: CPT

## 2021-10-18 PROCEDURE — 82962 GLUCOSE BLOOD TEST: CPT

## 2021-10-18 PROCEDURE — 63710000001 INSULIN ASPART PER 5 UNITS: Performed by: STUDENT IN AN ORGANIZED HEALTH CARE EDUCATION/TRAINING PROGRAM

## 2021-10-18 PROCEDURE — 25010000002 HYDRALAZINE PER 20 MG: Performed by: STUDENT IN AN ORGANIZED HEALTH CARE EDUCATION/TRAINING PROGRAM

## 2021-10-18 PROCEDURE — 63710000001 INSULIN DETEMIR PER 5 UNITS: Performed by: STUDENT IN AN ORGANIZED HEALTH CARE EDUCATION/TRAINING PROGRAM

## 2021-10-18 PROCEDURE — 94760 N-INVAS EAR/PLS OXIMETRY 1: CPT

## 2021-10-18 PROCEDURE — 97162 PT EVAL MOD COMPLEX 30 MIN: CPT

## 2021-10-18 PROCEDURE — 97166 OT EVAL MOD COMPLEX 45 MIN: CPT

## 2021-10-18 PROCEDURE — 99231 SBSQ HOSP IP/OBS SF/LOW 25: CPT | Performed by: INTERNAL MEDICINE

## 2021-10-18 PROCEDURE — 85027 COMPLETE CBC AUTOMATED: CPT | Performed by: STUDENT IN AN ORGANIZED HEALTH CARE EDUCATION/TRAINING PROGRAM

## 2021-10-18 PROCEDURE — 80053 COMPREHEN METABOLIC PANEL: CPT | Performed by: STUDENT IN AN ORGANIZED HEALTH CARE EDUCATION/TRAINING PROGRAM

## 2021-10-18 RX ORDER — NEOMYCIN SULFATE, POLYMYXIN B SULFATE AND HYDROCORTISONE 10; 3.5; 1 MG/ML; MG/ML; [USP'U]/ML
4 SUSPENSION/ DROPS AURICULAR (OTIC) EVERY 6 HOURS SCHEDULED
Status: DISCONTINUED | OUTPATIENT
Start: 2021-10-18 | End: 2021-10-18

## 2021-10-18 RX ADMIN — LEVOTHYROXINE SODIUM 25 MCG: 25 TABLET ORAL at 05:53

## 2021-10-18 RX ADMIN — INSULIN ASPART 20 UNITS: 100 INJECTION, SOLUTION INTRAVENOUS; SUBCUTANEOUS at 09:28

## 2021-10-18 RX ADMIN — SODIUM CHLORIDE, PRESERVATIVE FREE 10 ML: 5 INJECTION INTRAVENOUS at 20:17

## 2021-10-18 RX ADMIN — PANTOPRAZOLE SODIUM 40 MG: 40 INJECTION, POWDER, FOR SOLUTION INTRAVENOUS at 09:28

## 2021-10-18 RX ADMIN — CANAGLIFLOZIN 100 MG: 100 TABLET, FILM COATED ORAL at 17:55

## 2021-10-18 RX ADMIN — METOPROLOL TARTRATE 50 MG: 50 TABLET, FILM COATED ORAL at 20:17

## 2021-10-18 RX ADMIN — ATORVASTATIN CALCIUM 20 MG: 20 TABLET, FILM COATED ORAL at 09:14

## 2021-10-18 RX ADMIN — INSULIN DETEMIR 30 UNITS: 100 INJECTION, SOLUTION SUBCUTANEOUS at 20:15

## 2021-10-18 RX ADMIN — INSULIN ASPART 20 UNITS: 100 INJECTION, SOLUTION INTRAVENOUS; SUBCUTANEOUS at 11:30

## 2021-10-18 RX ADMIN — ISOSORBIDE MONONITRATE 30 MG: 30 TABLET, EXTENDED RELEASE ORAL at 05:53

## 2021-10-18 RX ADMIN — NEOMYCIN SULFATE, POLYMYXIN B SULFATE AND HYDROCORTISONE 4 DROP: 3.5; 10000; 1 SOLUTION AURICULAR (OTIC) at 17:56

## 2021-10-18 RX ADMIN — INSULIN DETEMIR 15 UNITS: 100 INJECTION, SOLUTION SUBCUTANEOUS at 09:17

## 2021-10-18 RX ADMIN — IPRATROPIUM BROMIDE AND ALBUTEROL SULFATE 3 ML: 2.5; .5 SOLUTION RESPIRATORY (INHALATION) at 07:42

## 2021-10-18 RX ADMIN — INSULIN ASPART 16 UNITS: 100 INJECTION, SOLUTION INTRAVENOUS; SUBCUTANEOUS at 11:29

## 2021-10-18 RX ADMIN — IPRATROPIUM BROMIDE AND ALBUTEROL SULFATE 3 ML: 2.5; .5 SOLUTION RESPIRATORY (INHALATION) at 19:47

## 2021-10-18 RX ADMIN — SODIUM CHLORIDE, PRESERVATIVE FREE 10 ML: 5 INJECTION INTRAVENOUS at 09:28

## 2021-10-18 RX ADMIN — EPOETIN ALFA-EPBX 10000 UNITS: 10000 INJECTION, SOLUTION INTRAVENOUS; SUBCUTANEOUS at 15:35

## 2021-10-18 RX ADMIN — METOPROLOL TARTRATE 50 MG: 50 TABLET, FILM COATED ORAL at 09:13

## 2021-10-18 RX ADMIN — INSULIN ASPART 20 UNITS: 100 INJECTION, SOLUTION INTRAVENOUS; SUBCUTANEOUS at 17:55

## 2021-10-18 RX ADMIN — NYSTATIN: 100000 POWDER TOPICAL at 09:18

## 2021-10-18 RX ADMIN — INSULIN ASPART 12 UNITS: 100 INJECTION, SOLUTION INTRAVENOUS; SUBCUTANEOUS at 09:28

## 2021-10-18 RX ADMIN — FERROUS SULFATE TAB EC 324 MG (65 MG FE EQUIVALENT) 324 MG: 324 (65 FE) TABLET DELAYED RESPONSE at 09:14

## 2021-10-18 RX ADMIN — GABAPENTIN 400 MG: 400 CAPSULE ORAL at 15:34

## 2021-10-18 RX ADMIN — GABAPENTIN 400 MG: 400 CAPSULE ORAL at 20:17

## 2021-10-18 RX ADMIN — SODIUM CHLORIDE, PRESERVATIVE FREE 10 ML: 5 INJECTION INTRAVENOUS at 09:14

## 2021-10-18 RX ADMIN — NEOMYCIN SULFATE, POLYMYXIN B SULFATE AND HYDROCORTISONE 4 DROP: 3.5; 10000; 1 SOLUTION AURICULAR (OTIC) at 23:38

## 2021-10-18 RX ADMIN — INSULIN ASPART 4 UNITS: 100 INJECTION, SOLUTION INTRAVENOUS; SUBCUTANEOUS at 17:55

## 2021-10-18 RX ADMIN — NYSTATIN: 100000 POWDER TOPICAL at 20:17

## 2021-10-18 RX ADMIN — NEOMYCIN SULFATE, POLYMYXIN B SULFATE AND HYDROCORTISONE 4 DROP: 3.5; 10000; 1 SOLUTION AURICULAR (OTIC) at 11:25

## 2021-10-18 RX ADMIN — CETIRIZINE HYDROCHLORIDE 10 MG: 10 TABLET, FILM COATED ORAL at 09:13

## 2021-10-18 RX ADMIN — OXYBUTYNIN CHLORIDE 5 MG: 5 TABLET, EXTENDED RELEASE ORAL at 09:14

## 2021-10-18 RX ADMIN — MONTELUKAST 10 MG: 10 TABLET, FILM COATED ORAL at 20:17

## 2021-10-18 RX ADMIN — DULOXETINE HYDROCHLORIDE 20 MG: 20 CAPSULE, DELAYED RELEASE ORAL at 09:14

## 2021-10-18 RX ADMIN — BUMETANIDE 2 MG: 1 TABLET ORAL at 09:14

## 2021-10-18 RX ADMIN — IPRATROPIUM BROMIDE AND ALBUTEROL SULFATE 3 ML: 2.5; .5 SOLUTION RESPIRATORY (INHALATION) at 14:31

## 2021-10-18 RX ADMIN — GABAPENTIN 400 MG: 400 CAPSULE ORAL at 09:14

## 2021-10-18 RX ADMIN — LIDOCAINE 1 PATCH: 50 PATCH CUTANEOUS at 09:16

## 2021-10-18 RX ADMIN — CYANOCOBALAMIN TAB 1000 MCG 1000 MCG: 1000 TAB at 09:14

## 2021-10-18 RX ADMIN — HYDRALAZINE HYDROCHLORIDE 10 MG: 20 INJECTION INTRAMUSCULAR; INTRAVENOUS at 09:37

## 2021-10-18 RX ADMIN — IPRATROPIUM BROMIDE AND ALBUTEROL SULFATE 3 ML: 2.5; .5 SOLUTION RESPIRATORY (INHALATION) at 10:30

## 2021-10-18 RX ADMIN — PANTOPRAZOLE SODIUM 40 MG: 40 INJECTION, POWDER, FOR SOLUTION INTRAVENOUS at 20:17

## 2021-10-18 RX ADMIN — SODIUM CHLORIDE, PRESERVATIVE FREE 10 ML: 5 INJECTION INTRAVENOUS at 20:18

## 2021-10-19 LAB
ALBUMIN SERPL-MCNC: 3.4 G/DL (ref 3.5–5.2)
ALBUMIN/GLOB SERPL: 0.8 G/DL
ALP SERPL-CCNC: 129 U/L (ref 39–117)
ALT SERPL W P-5'-P-CCNC: 10 U/L (ref 1–33)
ANION GAP SERPL CALCULATED.3IONS-SCNC: 10 MMOL/L (ref 5–15)
AST SERPL-CCNC: 10 U/L (ref 1–32)
BACTERIA SPEC AEROBE CULT: NORMAL
BACTERIA SPEC AEROBE CULT: NORMAL
BILIRUB SERPL-MCNC: 0.2 MG/DL (ref 0–1.2)
BUN SERPL-MCNC: 57 MG/DL (ref 8–23)
BUN/CREAT SERPL: 17.1 (ref 7–25)
CALCIUM SPEC-SCNC: 9.2 MG/DL (ref 8.6–10.5)
CHLORIDE SERPL-SCNC: 96 MMOL/L (ref 98–107)
CO2 SERPL-SCNC: 24 MMOL/L (ref 22–29)
CREAT SERPL-MCNC: 3.34 MG/DL (ref 0.57–1)
DEPRECATED RDW RBC AUTO: 48.8 FL (ref 37–54)
ERYTHROCYTE [DISTWIDTH] IN BLOOD BY AUTOMATED COUNT: 15.7 % (ref 12.3–15.4)
GFR SERPL CREATININE-BSD FRML MDRD: 14 ML/MIN/1.73
GFR SERPL CREATININE-BSD FRML MDRD: ABNORMAL ML/MIN/{1.73_M2}
GLOBULIN UR ELPH-MCNC: 4.3 GM/DL
GLUCOSE BLDC GLUCOMTR-MCNC: 123 MG/DL (ref 70–130)
GLUCOSE BLDC GLUCOMTR-MCNC: 126 MG/DL (ref 70–130)
GLUCOSE BLDC GLUCOMTR-MCNC: 290 MG/DL (ref 70–130)
GLUCOSE BLDC GLUCOMTR-MCNC: 392 MG/DL (ref 70–130)
GLUCOSE SERPL-MCNC: 303 MG/DL (ref 65–99)
HCT VFR BLD AUTO: 29.4 % (ref 34–46.6)
HGB BLD-MCNC: 9.2 G/DL (ref 12–15.9)
MCH RBC QN AUTO: 27.1 PG (ref 26.6–33)
MCHC RBC AUTO-ENTMCNC: 31.3 G/DL (ref 31.5–35.7)
MCV RBC AUTO: 86.7 FL (ref 79–97)
PLATELET # BLD AUTO: 270 10*3/MM3 (ref 140–450)
PMV BLD AUTO: 8.8 FL (ref 6–12)
POTASSIUM SERPL-SCNC: 5 MMOL/L (ref 3.5–5.2)
PROT SERPL-MCNC: 7.7 G/DL (ref 6–8.5)
RBC # BLD AUTO: 3.39 10*6/MM3 (ref 3.77–5.28)
SODIUM SERPL-SCNC: 130 MMOL/L (ref 136–145)
WBC # BLD AUTO: 9.55 10*3/MM3 (ref 3.4–10.8)

## 2021-10-19 PROCEDURE — 97110 THERAPEUTIC EXERCISES: CPT

## 2021-10-19 PROCEDURE — 94799 UNLISTED PULMONARY SVC/PX: CPT

## 2021-10-19 PROCEDURE — 80053 COMPREHEN METABOLIC PANEL: CPT | Performed by: STUDENT IN AN ORGANIZED HEALTH CARE EDUCATION/TRAINING PROGRAM

## 2021-10-19 PROCEDURE — 82962 GLUCOSE BLOOD TEST: CPT

## 2021-10-19 PROCEDURE — 63710000001 INSULIN DETEMIR PER 5 UNITS: Performed by: STUDENT IN AN ORGANIZED HEALTH CARE EDUCATION/TRAINING PROGRAM

## 2021-10-19 PROCEDURE — 85027 COMPLETE CBC AUTOMATED: CPT | Performed by: STUDENT IN AN ORGANIZED HEALTH CARE EDUCATION/TRAINING PROGRAM

## 2021-10-19 PROCEDURE — 82043 UR ALBUMIN QUANTITATIVE: CPT | Performed by: STUDENT IN AN ORGANIZED HEALTH CARE EDUCATION/TRAINING PROGRAM

## 2021-10-19 PROCEDURE — 99231 SBSQ HOSP IP/OBS SF/LOW 25: CPT | Performed by: INTERNAL MEDICINE

## 2021-10-19 PROCEDURE — 63710000001 INSULIN ASPART PER 5 UNITS: Performed by: STUDENT IN AN ORGANIZED HEALTH CARE EDUCATION/TRAINING PROGRAM

## 2021-10-19 PROCEDURE — 94760 N-INVAS EAR/PLS OXIMETRY 1: CPT

## 2021-10-19 PROCEDURE — 97116 GAIT TRAINING THERAPY: CPT

## 2021-10-19 PROCEDURE — 36415 COLL VENOUS BLD VENIPUNCTURE: CPT | Performed by: STUDENT IN AN ORGANIZED HEALTH CARE EDUCATION/TRAINING PROGRAM

## 2021-10-19 RX ORDER — SODIUM CHLORIDE 9 MG/ML
75 INJECTION, SOLUTION INTRAVENOUS CONTINUOUS
Status: DISPENSED | OUTPATIENT
Start: 2021-10-19 | End: 2021-10-19

## 2021-10-19 RX ORDER — CLONIDINE HYDROCHLORIDE 0.1 MG/1
0.1 TABLET ORAL EVERY 12 HOURS SCHEDULED
Status: DISCONTINUED | OUTPATIENT
Start: 2021-10-19 | End: 2021-10-21

## 2021-10-19 RX ORDER — PANTOPRAZOLE SODIUM 40 MG/10ML
40 INJECTION, POWDER, LYOPHILIZED, FOR SOLUTION INTRAVENOUS
Status: DISCONTINUED | OUTPATIENT
Start: 2021-10-20 | End: 2021-10-20

## 2021-10-19 RX ADMIN — CLONIDINE HYDROCHLORIDE 0.1 MG: 0.1 TABLET ORAL at 21:48

## 2021-10-19 RX ADMIN — IPRATROPIUM BROMIDE AND ALBUTEROL SULFATE 3 ML: 2.5; .5 SOLUTION RESPIRATORY (INHALATION) at 07:33

## 2021-10-19 RX ADMIN — CLONIDINE HYDROCHLORIDE 0.1 MG: 0.1 TABLET ORAL at 11:43

## 2021-10-19 RX ADMIN — INSULIN ASPART 20 UNITS: 100 INJECTION, SOLUTION INTRAVENOUS; SUBCUTANEOUS at 11:48

## 2021-10-19 RX ADMIN — CANAGLIFLOZIN 100 MG: 100 TABLET, FILM COATED ORAL at 09:51

## 2021-10-19 RX ADMIN — NEOMYCIN SULFATE, POLYMYXIN B SULFATE AND HYDROCORTISONE 4 DROP: 3.5; 10000; 1 SOLUTION AURICULAR (OTIC) at 21:48

## 2021-10-19 RX ADMIN — SODIUM CHLORIDE, PRESERVATIVE FREE 10 ML: 5 INJECTION INTRAVENOUS at 21:46

## 2021-10-19 RX ADMIN — SODIUM CHLORIDE, PRESERVATIVE FREE 10 ML: 5 INJECTION INTRAVENOUS at 10:02

## 2021-10-19 RX ADMIN — NYSTATIN: 100000 POWDER TOPICAL at 10:03

## 2021-10-19 RX ADMIN — METOPROLOL TARTRATE 50 MG: 50 TABLET, FILM COATED ORAL at 21:48

## 2021-10-19 RX ADMIN — INSULIN DETEMIR 15 UNITS: 100 INJECTION, SOLUTION SUBCUTANEOUS at 09:49

## 2021-10-19 RX ADMIN — INSULIN ASPART 20 UNITS: 100 INJECTION, SOLUTION INTRAVENOUS; SUBCUTANEOUS at 10:02

## 2021-10-19 RX ADMIN — LIDOCAINE 1 PATCH: 50 PATCH CUTANEOUS at 09:54

## 2021-10-19 RX ADMIN — GABAPENTIN 400 MG: 400 CAPSULE ORAL at 09:50

## 2021-10-19 RX ADMIN — DULOXETINE HYDROCHLORIDE 20 MG: 20 CAPSULE, DELAYED RELEASE ORAL at 09:50

## 2021-10-19 RX ADMIN — MONTELUKAST 10 MG: 10 TABLET, FILM COATED ORAL at 21:48

## 2021-10-19 RX ADMIN — INSULIN DETEMIR 40 UNITS: 100 INJECTION, SOLUTION SUBCUTANEOUS at 21:46

## 2021-10-19 RX ADMIN — NEOMYCIN SULFATE, POLYMYXIN B SULFATE AND HYDROCORTISONE 4 DROP: 3.5; 10000; 1 SOLUTION AURICULAR (OTIC) at 09:56

## 2021-10-19 RX ADMIN — PANTOPRAZOLE SODIUM 40 MG: 40 INJECTION, POWDER, FOR SOLUTION INTRAVENOUS at 09:51

## 2021-10-19 RX ADMIN — SODIUM CHLORIDE 75 ML/HR: 9 INJECTION, SOLUTION INTRAVENOUS at 11:47

## 2021-10-19 RX ADMIN — NYSTATIN: 100000 POWDER TOPICAL at 21:48

## 2021-10-19 RX ADMIN — ATORVASTATIN CALCIUM 20 MG: 20 TABLET, FILM COATED ORAL at 09:51

## 2021-10-19 RX ADMIN — NYSTATIN: 100000 POWDER TOPICAL at 17:50

## 2021-10-19 RX ADMIN — NEOMYCIN SULFATE, POLYMYXIN B SULFATE AND HYDROCORTISONE 4 DROP: 3.5; 10000; 1 SOLUTION AURICULAR (OTIC) at 15:38

## 2021-10-19 RX ADMIN — ISOSORBIDE MONONITRATE 30 MG: 30 TABLET, EXTENDED RELEASE ORAL at 05:12

## 2021-10-19 RX ADMIN — LEVOTHYROXINE SODIUM 25 MCG: 25 TABLET ORAL at 05:12

## 2021-10-19 RX ADMIN — INSULIN ASPART 20 UNITS: 100 INJECTION, SOLUTION INTRAVENOUS; SUBCUTANEOUS at 11:44

## 2021-10-19 RX ADMIN — FERROUS SULFATE TAB EC 324 MG (65 MG FE EQUIVALENT) 324 MG: 324 (65 FE) TABLET DELAYED RESPONSE at 09:50

## 2021-10-19 RX ADMIN — GABAPENTIN 400 MG: 400 CAPSULE ORAL at 21:48

## 2021-10-19 RX ADMIN — SODIUM CHLORIDE, PRESERVATIVE FREE 10 ML: 5 INJECTION INTRAVENOUS at 21:48

## 2021-10-19 RX ADMIN — SODIUM CHLORIDE, PRESERVATIVE FREE 10 ML: 5 INJECTION INTRAVENOUS at 11:45

## 2021-10-19 RX ADMIN — BUMETANIDE 2 MG: 1 TABLET ORAL at 09:50

## 2021-10-19 RX ADMIN — CYANOCOBALAMIN TAB 1000 MCG 1000 MCG: 1000 TAB at 09:51

## 2021-10-19 RX ADMIN — CETIRIZINE HYDROCHLORIDE 10 MG: 10 TABLET, FILM COATED ORAL at 09:51

## 2021-10-19 RX ADMIN — GABAPENTIN 400 MG: 400 CAPSULE ORAL at 15:38

## 2021-10-19 RX ADMIN — SODIUM CHLORIDE, PRESERVATIVE FREE 10 ML: 5 INJECTION INTRAVENOUS at 10:01

## 2021-10-19 RX ADMIN — OXYBUTYNIN CHLORIDE 5 MG: 5 TABLET, EXTENDED RELEASE ORAL at 09:51

## 2021-10-19 RX ADMIN — INSULIN ASPART 16 UNITS: 100 INJECTION, SOLUTION INTRAVENOUS; SUBCUTANEOUS at 10:01

## 2021-10-19 RX ADMIN — IPRATROPIUM BROMIDE AND ALBUTEROL SULFATE 3 ML: 2.5; .5 SOLUTION RESPIRATORY (INHALATION) at 19:12

## 2021-10-19 RX ADMIN — IPRATROPIUM BROMIDE AND ALBUTEROL SULFATE 3 ML: 2.5; .5 SOLUTION RESPIRATORY (INHALATION) at 14:51

## 2021-10-19 RX ADMIN — METOPROLOL TARTRATE 50 MG: 50 TABLET, FILM COATED ORAL at 09:51

## 2021-10-20 PROBLEM — E87.1 HYPONATREMIA: Status: ACTIVE | Noted: 2021-10-20

## 2021-10-20 LAB
ALBUMIN SERPL-MCNC: 3.1 G/DL (ref 3.5–5.2)
ALBUMIN UR-MCNC: 192 MG/DL
ALBUMIN/GLOB SERPL: 0.8 G/DL
ALP SERPL-CCNC: 121 U/L (ref 39–117)
ALT SERPL W P-5'-P-CCNC: 8 U/L (ref 1–33)
ANION GAP SERPL CALCULATED.3IONS-SCNC: 11 MMOL/L (ref 5–15)
AST SERPL-CCNC: 12 U/L (ref 1–32)
BILIRUB SERPL-MCNC: 0.2 MG/DL (ref 0–1.2)
BUN SERPL-MCNC: 62 MG/DL (ref 8–23)
BUN/CREAT SERPL: 17.1 (ref 7–25)
CALCIUM SPEC-SCNC: 8.9 MG/DL (ref 8.6–10.5)
CHLORIDE SERPL-SCNC: 97 MMOL/L (ref 98–107)
CO2 SERPL-SCNC: 23 MMOL/L (ref 22–29)
CREAT SERPL-MCNC: 3.63 MG/DL (ref 0.57–1)
DEPRECATED RDW RBC AUTO: 48.9 FL (ref 37–54)
ERYTHROCYTE [DISTWIDTH] IN BLOOD BY AUTOMATED COUNT: 15.6 % (ref 12.3–15.4)
GFR SERPL CREATININE-BSD FRML MDRD: 13 ML/MIN/1.73
GFR SERPL CREATININE-BSD FRML MDRD: ABNORMAL ML/MIN/{1.73_M2}
GLOBULIN UR ELPH-MCNC: 4.1 GM/DL
GLUCOSE BLDC GLUCOMTR-MCNC: 171 MG/DL (ref 70–130)
GLUCOSE BLDC GLUCOMTR-MCNC: 204 MG/DL (ref 70–130)
GLUCOSE BLDC GLUCOMTR-MCNC: 215 MG/DL (ref 70–130)
GLUCOSE BLDC GLUCOMTR-MCNC: 235 MG/DL (ref 70–130)
GLUCOSE BLDC GLUCOMTR-MCNC: 276 MG/DL (ref 70–130)
GLUCOSE SERPL-MCNC: 277 MG/DL (ref 65–99)
HCT VFR BLD AUTO: 26.5 % (ref 34–46.6)
HGB BLD-MCNC: 8.3 G/DL (ref 12–15.9)
MCH RBC QN AUTO: 27.1 PG (ref 26.6–33)
MCHC RBC AUTO-ENTMCNC: 31.3 G/DL (ref 31.5–35.7)
MCV RBC AUTO: 86.6 FL (ref 79–97)
PLATELET # BLD AUTO: 236 10*3/MM3 (ref 140–450)
PMV BLD AUTO: 8.7 FL (ref 6–12)
POTASSIUM SERPL-SCNC: 5 MMOL/L (ref 3.5–5.2)
PROT SERPL-MCNC: 7.2 G/DL (ref 6–8.5)
RBC # BLD AUTO: 3.06 10*6/MM3 (ref 3.77–5.28)
SODIUM SERPL-SCNC: 131 MMOL/L (ref 136–145)
WBC # BLD AUTO: 7.4 10*3/MM3 (ref 3.4–10.8)

## 2021-10-20 PROCEDURE — 85027 COMPLETE CBC AUTOMATED: CPT | Performed by: STUDENT IN AN ORGANIZED HEALTH CARE EDUCATION/TRAINING PROGRAM

## 2021-10-20 PROCEDURE — 63710000001 INSULIN DETEMIR PER 5 UNITS: Performed by: STUDENT IN AN ORGANIZED HEALTH CARE EDUCATION/TRAINING PROGRAM

## 2021-10-20 PROCEDURE — 97116 GAIT TRAINING THERAPY: CPT

## 2021-10-20 PROCEDURE — 82962 GLUCOSE BLOOD TEST: CPT

## 2021-10-20 PROCEDURE — 80053 COMPREHEN METABOLIC PANEL: CPT | Performed by: STUDENT IN AN ORGANIZED HEALTH CARE EDUCATION/TRAINING PROGRAM

## 2021-10-20 PROCEDURE — 94799 UNLISTED PULMONARY SVC/PX: CPT

## 2021-10-20 PROCEDURE — 97530 THERAPEUTIC ACTIVITIES: CPT

## 2021-10-20 PROCEDURE — 63710000001 INSULIN ASPART PER 5 UNITS: Performed by: STUDENT IN AN ORGANIZED HEALTH CARE EDUCATION/TRAINING PROGRAM

## 2021-10-20 PROCEDURE — 94760 N-INVAS EAR/PLS OXIMETRY 1: CPT

## 2021-10-20 PROCEDURE — 99231 SBSQ HOSP IP/OBS SF/LOW 25: CPT | Performed by: INTERNAL MEDICINE

## 2021-10-20 PROCEDURE — 97110 THERAPEUTIC EXERCISES: CPT

## 2021-10-20 RX ORDER — PANTOPRAZOLE SODIUM 40 MG/10ML
40 INJECTION, POWDER, LYOPHILIZED, FOR SOLUTION INTRAVENOUS 2 TIMES DAILY
Status: DISCONTINUED | OUTPATIENT
Start: 2021-10-20 | End: 2021-10-20

## 2021-10-20 RX ORDER — PANTOPRAZOLE SODIUM 40 MG/10ML
40 INJECTION, POWDER, LYOPHILIZED, FOR SOLUTION INTRAVENOUS
Status: DISCONTINUED | OUTPATIENT
Start: 2021-10-21 | End: 2021-10-28 | Stop reason: HOSPADM

## 2021-10-20 RX ADMIN — LEVOTHYROXINE SODIUM 25 MCG: 25 TABLET ORAL at 05:24

## 2021-10-20 RX ADMIN — INSULIN ASPART 8 UNITS: 100 INJECTION, SOLUTION INTRAVENOUS; SUBCUTANEOUS at 11:29

## 2021-10-20 RX ADMIN — MONTELUKAST 10 MG: 10 TABLET, FILM COATED ORAL at 20:46

## 2021-10-20 RX ADMIN — METOPROLOL TARTRATE 50 MG: 50 TABLET, FILM COATED ORAL at 20:46

## 2021-10-20 RX ADMIN — CLONIDINE HYDROCHLORIDE 0.1 MG: 0.1 TABLET ORAL at 20:46

## 2021-10-20 RX ADMIN — NYSTATIN: 100000 POWDER TOPICAL at 20:47

## 2021-10-20 RX ADMIN — IPRATROPIUM BROMIDE AND ALBUTEROL SULFATE 3 ML: 2.5; .5 SOLUTION RESPIRATORY (INHALATION) at 07:49

## 2021-10-20 RX ADMIN — GABAPENTIN 400 MG: 400 CAPSULE ORAL at 08:31

## 2021-10-20 RX ADMIN — PANTOPRAZOLE SODIUM 40 MG: 40 INJECTION, POWDER, FOR SOLUTION INTRAVENOUS at 05:24

## 2021-10-20 RX ADMIN — METOPROLOL TARTRATE 50 MG: 50 TABLET, FILM COATED ORAL at 08:31

## 2021-10-20 RX ADMIN — SODIUM CHLORIDE, PRESERVATIVE FREE 10 ML: 5 INJECTION INTRAVENOUS at 20:47

## 2021-10-20 RX ADMIN — OXYBUTYNIN CHLORIDE 5 MG: 5 TABLET, EXTENDED RELEASE ORAL at 08:31

## 2021-10-20 RX ADMIN — LIDOCAINE 1 PATCH: 50 PATCH CUTANEOUS at 08:34

## 2021-10-20 RX ADMIN — IPRATROPIUM BROMIDE AND ALBUTEROL SULFATE 3 ML: 2.5; .5 SOLUTION RESPIRATORY (INHALATION) at 19:26

## 2021-10-20 RX ADMIN — CLONIDINE HYDROCHLORIDE 0.1 MG: 0.1 TABLET ORAL at 08:31

## 2021-10-20 RX ADMIN — GABAPENTIN 400 MG: 400 CAPSULE ORAL at 20:46

## 2021-10-20 RX ADMIN — GABAPENTIN 400 MG: 400 CAPSULE ORAL at 17:25

## 2021-10-20 RX ADMIN — INSULIN DETEMIR 40 UNITS: 100 INJECTION, SOLUTION SUBCUTANEOUS at 21:43

## 2021-10-20 RX ADMIN — NYSTATIN: 100000 POWDER TOPICAL at 17:25

## 2021-10-20 RX ADMIN — DULOXETINE HYDROCHLORIDE 20 MG: 20 CAPSULE, DELAYED RELEASE ORAL at 08:31

## 2021-10-20 RX ADMIN — CYANOCOBALAMIN TAB 1000 MCG 1000 MCG: 1000 TAB at 08:31

## 2021-10-20 RX ADMIN — CETIRIZINE HYDROCHLORIDE 10 MG: 10 TABLET, FILM COATED ORAL at 08:31

## 2021-10-20 RX ADMIN — INSULIN ASPART 20 UNITS: 100 INJECTION, SOLUTION INTRAVENOUS; SUBCUTANEOUS at 08:32

## 2021-10-20 RX ADMIN — INSULIN DETEMIR 15 UNITS: 100 INJECTION, SOLUTION SUBCUTANEOUS at 06:46

## 2021-10-20 RX ADMIN — INSULIN ASPART 8 UNITS: 100 INJECTION, SOLUTION INTRAVENOUS; SUBCUTANEOUS at 17:26

## 2021-10-20 RX ADMIN — SODIUM CHLORIDE, PRESERVATIVE FREE 10 ML: 5 INJECTION INTRAVENOUS at 20:46

## 2021-10-20 RX ADMIN — INSULIN ASPART 12 UNITS: 100 INJECTION, SOLUTION INTRAVENOUS; SUBCUTANEOUS at 06:46

## 2021-10-20 RX ADMIN — SODIUM CHLORIDE, PRESERVATIVE FREE 10 ML: 5 INJECTION INTRAVENOUS at 08:40

## 2021-10-20 RX ADMIN — CANAGLIFLOZIN 100 MG: 100 TABLET, FILM COATED ORAL at 08:33

## 2021-10-20 RX ADMIN — INSULIN ASPART 20 UNITS: 100 INJECTION, SOLUTION INTRAVENOUS; SUBCUTANEOUS at 17:26

## 2021-10-20 RX ADMIN — ISOSORBIDE MONONITRATE 30 MG: 30 TABLET, EXTENDED RELEASE ORAL at 05:24

## 2021-10-20 RX ADMIN — ATORVASTATIN CALCIUM 20 MG: 20 TABLET, FILM COATED ORAL at 08:31

## 2021-10-20 RX ADMIN — IPRATROPIUM BROMIDE AND ALBUTEROL SULFATE 3 ML: 2.5; .5 SOLUTION RESPIRATORY (INHALATION) at 11:30

## 2021-10-20 RX ADMIN — NEOMYCIN SULFATE, POLYMYXIN B SULFATE AND HYDROCORTISONE 4 DROP: 3.5; 10000; 1 SOLUTION AURICULAR (OTIC) at 08:32

## 2021-10-20 RX ADMIN — NYSTATIN: 100000 POWDER TOPICAL at 08:31

## 2021-10-20 RX ADMIN — NEOMYCIN SULFATE, POLYMYXIN B SULFATE AND HYDROCORTISONE 4 DROP: 3.5; 10000; 1 SOLUTION AURICULAR (OTIC) at 17:26

## 2021-10-20 RX ADMIN — FERROUS SULFATE TAB EC 324 MG (65 MG FE EQUIVALENT) 324 MG: 324 (65 FE) TABLET DELAYED RESPONSE at 08:31

## 2021-10-20 RX ADMIN — INSULIN ASPART 20 UNITS: 100 INJECTION, SOLUTION INTRAVENOUS; SUBCUTANEOUS at 12:45

## 2021-10-21 ENCOUNTER — APPOINTMENT (OUTPATIENT)
Dept: ULTRASOUND IMAGING | Facility: HOSPITAL | Age: 61
End: 2021-10-21

## 2021-10-21 ENCOUNTER — APPOINTMENT (OUTPATIENT)
Dept: GENERAL RADIOLOGY | Facility: HOSPITAL | Age: 61
End: 2021-10-21

## 2021-10-21 PROBLEM — J96.11 CHRONIC RESPIRATORY FAILURE WITH HYPOXIA: Status: ACTIVE | Noted: 2020-06-24

## 2021-10-21 PROBLEM — Z99.2 ESRD (END STAGE RENAL DISEASE) ON DIALYSIS: Status: ACTIVE | Noted: 2021-10-11

## 2021-10-21 PROBLEM — N18.6 ESRD (END STAGE RENAL DISEASE) ON DIALYSIS: Status: ACTIVE | Noted: 2021-10-11

## 2021-10-21 PROBLEM — H92.01 EAR PAIN, RIGHT: Status: RESOLVED | Noted: 2021-10-18 | Resolved: 2021-10-21

## 2021-10-21 LAB
ALBUMIN SERPL-MCNC: 3.5 G/DL (ref 3.5–5.2)
ALBUMIN/GLOB SERPL: 0.8 G/DL
ALP SERPL-CCNC: 133 U/L (ref 39–117)
ALT SERPL W P-5'-P-CCNC: 9 U/L (ref 1–33)
ANION GAP SERPL CALCULATED.3IONS-SCNC: 10 MMOL/L (ref 5–15)
AST SERPL-CCNC: 14 U/L (ref 1–32)
BILIRUB SERPL-MCNC: <0.2 MG/DL (ref 0–1.2)
BUN SERPL-MCNC: 65 MG/DL (ref 8–23)
BUN/CREAT SERPL: 18.6 (ref 7–25)
CALCIUM SPEC-SCNC: 8.8 MG/DL (ref 8.6–10.5)
CHLORIDE SERPL-SCNC: 99 MMOL/L (ref 98–107)
CO2 SERPL-SCNC: 23 MMOL/L (ref 22–29)
CREAT SERPL-MCNC: 3.5 MG/DL (ref 0.57–1)
DEPRECATED RDW RBC AUTO: 49.7 FL (ref 37–54)
ERYTHROCYTE [DISTWIDTH] IN BLOOD BY AUTOMATED COUNT: 15.6 % (ref 12.3–15.4)
GFR SERPL CREATININE-BSD FRML MDRD: 13 ML/MIN/1.73
GFR SERPL CREATININE-BSD FRML MDRD: ABNORMAL ML/MIN/{1.73_M2}
GLOBULIN UR ELPH-MCNC: 4.2 GM/DL
GLUCOSE BLDC GLUCOMTR-MCNC: 145 MG/DL (ref 70–130)
GLUCOSE BLDC GLUCOMTR-MCNC: 240 MG/DL (ref 70–130)
GLUCOSE BLDC GLUCOMTR-MCNC: 242 MG/DL (ref 70–130)
GLUCOSE BLDC GLUCOMTR-MCNC: 246 MG/DL (ref 70–130)
GLUCOSE SERPL-MCNC: 251 MG/DL (ref 65–99)
HBV SURFACE AG SERPL QL IA: NORMAL
HCT VFR BLD AUTO: 28.6 % (ref 34–46.6)
HGB BLD-MCNC: 9 G/DL (ref 12–15.9)
HOLD SPECIMEN: NORMAL
HOLD SPECIMEN: NORMAL
INR PPP: 1.1 (ref 0.8–1.2)
MCH RBC QN AUTO: 27.4 PG (ref 26.6–33)
MCHC RBC AUTO-ENTMCNC: 31.5 G/DL (ref 31.5–35.7)
MCV RBC AUTO: 87.2 FL (ref 79–97)
PLATELET # BLD AUTO: 262 10*3/MM3 (ref 140–450)
PMV BLD AUTO: 8.9 FL (ref 6–12)
POTASSIUM SERPL-SCNC: 5.5 MMOL/L (ref 3.5–5.2)
PROT SERPL-MCNC: 7.7 G/DL (ref 6–8.5)
PROTHROMBIN TIME: 14.1 SECONDS (ref 11.1–15.3)
RBC # BLD AUTO: 3.28 10*6/MM3 (ref 3.77–5.28)
SODIUM SERPL-SCNC: 132 MMOL/L (ref 136–145)
WBC # BLD AUTO: 7.29 10*3/MM3 (ref 3.4–10.8)

## 2021-10-21 PROCEDURE — 5A1D70Z PERFORMANCE OF URINARY FILTRATION, INTERMITTENT, LESS THAN 6 HOURS PER DAY: ICD-10-PCS | Performed by: INTERNAL MEDICINE

## 2021-10-21 PROCEDURE — 36558 INSERT TUNNELED CV CATH: CPT | Performed by: THORACIC SURGERY (CARDIOTHORACIC VASCULAR SURGERY)

## 2021-10-21 PROCEDURE — 82962 GLUCOSE BLOOD TEST: CPT

## 2021-10-21 PROCEDURE — 63710000001 INSULIN DETEMIR PER 5 UNITS: Performed by: STUDENT IN AN ORGANIZED HEALTH CARE EDUCATION/TRAINING PROGRAM

## 2021-10-21 PROCEDURE — 63710000001 INSULIN ASPART PER 5 UNITS: Performed by: STUDENT IN AN ORGANIZED HEALTH CARE EDUCATION/TRAINING PROGRAM

## 2021-10-21 PROCEDURE — C1894 INTRO/SHEATH, NON-LASER: HCPCS | Performed by: THORACIC SURGERY (CARDIOTHORACIC VASCULAR SURGERY)

## 2021-10-21 PROCEDURE — 85027 COMPLETE CBC AUTOMATED: CPT | Performed by: STUDENT IN AN ORGANIZED HEALTH CARE EDUCATION/TRAINING PROGRAM

## 2021-10-21 PROCEDURE — 0JH63XZ INSERTION OF TUNNELED VASCULAR ACCESS DEVICE INTO CHEST SUBCUTANEOUS TISSUE AND FASCIA, PERCUTANEOUS APPROACH: ICD-10-PCS | Performed by: THORACIC SURGERY (CARDIOTHORACIC VASCULAR SURGERY)

## 2021-10-21 PROCEDURE — 77001 FLUOROGUIDE FOR VEIN DEVICE: CPT | Performed by: THORACIC SURGERY (CARDIOTHORACIC VASCULAR SURGERY)

## 2021-10-21 PROCEDURE — 97110 THERAPEUTIC EXERCISES: CPT

## 2021-10-21 PROCEDURE — 80053 COMPREHEN METABOLIC PANEL: CPT | Performed by: STUDENT IN AN ORGANIZED HEALTH CARE EDUCATION/TRAINING PROGRAM

## 2021-10-21 PROCEDURE — 99232 SBSQ HOSP IP/OBS MODERATE 35: CPT | Performed by: STUDENT IN AN ORGANIZED HEALTH CARE EDUCATION/TRAINING PROGRAM

## 2021-10-21 PROCEDURE — 85610 PROTHROMBIN TIME: CPT | Performed by: INTERNAL MEDICINE

## 2021-10-21 PROCEDURE — C1750 CATH, HEMODIALYSIS,LONG-TERM: HCPCS | Performed by: THORACIC SURGERY (CARDIOTHORACIC VASCULAR SURGERY)

## 2021-10-21 PROCEDURE — 94799 UNLISTED PULMONARY SVC/PX: CPT

## 2021-10-21 PROCEDURE — 02HV33Z INSERTION OF INFUSION DEVICE INTO SUPERIOR VENA CAVA, PERCUTANEOUS APPROACH: ICD-10-PCS | Performed by: THORACIC SURGERY (CARDIOTHORACIC VASCULAR SURGERY)

## 2021-10-21 PROCEDURE — 76937 US GUIDE VASCULAR ACCESS: CPT

## 2021-10-21 PROCEDURE — 25010000002 HEPARIN (PORCINE) PER 1000 UNITS: Performed by: INTERNAL MEDICINE

## 2021-10-21 PROCEDURE — 87340 HEPATITIS B SURFACE AG IA: CPT | Performed by: INTERNAL MEDICINE

## 2021-10-21 PROCEDURE — 25010000002 FENTANYL CITRATE (PF) 50 MCG/ML SOLUTION: Performed by: THORACIC SURGERY (CARDIOTHORACIC VASCULAR SURGERY)

## 2021-10-21 PROCEDURE — 25010000002 MIDAZOLAM PER 1 MG: Performed by: THORACIC SURGERY (CARDIOTHORACIC VASCULAR SURGERY)

## 2021-10-21 RX ORDER — ALBUMIN (HUMAN) 12.5 G/50ML
12.5 SOLUTION INTRAVENOUS AS NEEDED
Status: ACTIVE | OUTPATIENT
Start: 2021-10-22 | End: 2021-10-22

## 2021-10-21 RX ORDER — HEPARIN SODIUM 1000 [USP'U]/ML
2000 INJECTION, SOLUTION INTRAVENOUS; SUBCUTANEOUS AS NEEDED
Status: DISCONTINUED | OUTPATIENT
Start: 2021-10-21 | End: 2021-10-26

## 2021-10-21 RX ORDER — FENTANYL CITRATE 50 UG/ML
INJECTION, SOLUTION INTRAMUSCULAR; INTRAVENOUS
Status: DISPENSED
Start: 2021-10-21 | End: 2021-10-22

## 2021-10-21 RX ORDER — ALBUMIN (HUMAN) 12.5 G/50ML
12.5 SOLUTION INTRAVENOUS AS NEEDED
Status: ACTIVE | OUTPATIENT
Start: 2021-10-21 | End: 2021-10-22

## 2021-10-21 RX ORDER — LIDOCAINE HYDROCHLORIDE AND EPINEPHRINE 10; 10 MG/ML; UG/ML
INJECTION, SOLUTION INFILTRATION; PERINEURAL
Status: COMPLETED
Start: 2021-10-21 | End: 2021-10-21

## 2021-10-21 RX ORDER — MIDAZOLAM HYDROCHLORIDE 1 MG/ML
INJECTION INTRAMUSCULAR; INTRAVENOUS AS NEEDED
Status: DISCONTINUED | OUTPATIENT
Start: 2021-10-21 | End: 2021-10-21 | Stop reason: HOSPADM

## 2021-10-21 RX ORDER — LIDOCAINE AND PRILOCAINE 25; 25 MG/G; MG/G
CREAM TOPICAL AS NEEDED
Status: DISCONTINUED | OUTPATIENT
Start: 2021-10-21 | End: 2021-10-28 | Stop reason: HOSPADM

## 2021-10-21 RX ORDER — MIDAZOLAM HYDROCHLORIDE 1 MG/ML
INJECTION INTRAMUSCULAR; INTRAVENOUS
Status: DISPENSED
Start: 2021-10-21 | End: 2021-10-22

## 2021-10-21 RX ORDER — HEPARIN SODIUM 1000 [USP'U]/ML
2000 INJECTION, SOLUTION INTRAVENOUS; SUBCUTANEOUS AS NEEDED
Status: DISCONTINUED | OUTPATIENT
Start: 2021-10-22 | End: 2021-10-26

## 2021-10-21 RX ORDER — HYDROCODONE BITARTRATE AND ACETAMINOPHEN 5; 325 MG/1; MG/1
1 TABLET ORAL EVERY 4 HOURS PRN
Status: COMPLETED | OUTPATIENT
Start: 2021-10-21 | End: 2021-10-22

## 2021-10-21 RX ORDER — FENTANYL CITRATE 50 UG/ML
INJECTION, SOLUTION INTRAMUSCULAR; INTRAVENOUS AS NEEDED
Status: DISCONTINUED | OUTPATIENT
Start: 2021-10-21 | End: 2021-10-21 | Stop reason: HOSPADM

## 2021-10-21 RX ADMIN — ISOSORBIDE MONONITRATE 30 MG: 30 TABLET, EXTENDED RELEASE ORAL at 06:07

## 2021-10-21 RX ADMIN — INSULIN ASPART 12 UNITS: 100 INJECTION, SOLUTION INTRAVENOUS; SUBCUTANEOUS at 09:03

## 2021-10-21 RX ADMIN — CETIRIZINE HYDROCHLORIDE 10 MG: 10 TABLET, FILM COATED ORAL at 09:00

## 2021-10-21 RX ADMIN — SODIUM CHLORIDE, PRESERVATIVE FREE 10 ML: 5 INJECTION INTRAVENOUS at 09:22

## 2021-10-21 RX ADMIN — LIDOCAINE HYDROCHLORIDE AND EPINEPHRINE: 10; 10 INJECTION, SOLUTION INFILTRATION; PERINEURAL at 14:02

## 2021-10-21 RX ADMIN — LIDOCAINE 1 PATCH: 50 PATCH CUTANEOUS at 09:02

## 2021-10-21 RX ADMIN — SODIUM CHLORIDE, PRESERVATIVE FREE 10 ML: 5 INJECTION INTRAVENOUS at 21:09

## 2021-10-21 RX ADMIN — GABAPENTIN 400 MG: 400 CAPSULE ORAL at 09:01

## 2021-10-21 RX ADMIN — FERROUS SULFATE TAB EC 324 MG (65 MG FE EQUIVALENT) 324 MG: 324 (65 FE) TABLET DELAYED RESPONSE at 09:02

## 2021-10-21 RX ADMIN — SODIUM CHLORIDE, PRESERVATIVE FREE 10 ML: 5 INJECTION INTRAVENOUS at 09:16

## 2021-10-21 RX ADMIN — LEVOTHYROXINE SODIUM 25 MCG: 25 TABLET ORAL at 06:07

## 2021-10-21 RX ADMIN — INSULIN DETEMIR 15 UNITS: 100 INJECTION, SOLUTION SUBCUTANEOUS at 06:07

## 2021-10-21 RX ADMIN — GABAPENTIN 400 MG: 400 CAPSULE ORAL at 21:08

## 2021-10-21 RX ADMIN — CLONIDINE HYDROCHLORIDE 0.1 MG: 0.1 TABLET ORAL at 09:01

## 2021-10-21 RX ADMIN — OXYBUTYNIN CHLORIDE 5 MG: 5 TABLET, EXTENDED RELEASE ORAL at 09:02

## 2021-10-21 RX ADMIN — INSULIN DETEMIR 45 UNITS: 100 INJECTION, SOLUTION SUBCUTANEOUS at 21:09

## 2021-10-21 RX ADMIN — DULOXETINE HYDROCHLORIDE 20 MG: 20 CAPSULE, DELAYED RELEASE ORAL at 09:01

## 2021-10-21 RX ADMIN — IPRATROPIUM BROMIDE AND ALBUTEROL SULFATE 3 ML: 2.5; .5 SOLUTION RESPIRATORY (INHALATION) at 10:57

## 2021-10-21 RX ADMIN — HEPARIN SODIUM 2000 UNITS: 1000 INJECTION INTRAVENOUS; SUBCUTANEOUS at 17:58

## 2021-10-21 RX ADMIN — MONTELUKAST 10 MG: 10 TABLET, FILM COATED ORAL at 21:07

## 2021-10-21 RX ADMIN — CYANOCOBALAMIN TAB 1000 MCG 1000 MCG: 1000 TAB at 09:01

## 2021-10-21 RX ADMIN — CYCLOBENZAPRINE HYDROCHLORIDE 10 MG: 10 TABLET, FILM COATED ORAL at 18:59

## 2021-10-21 RX ADMIN — METOPROLOL TARTRATE 50 MG: 50 TABLET, FILM COATED ORAL at 21:08

## 2021-10-21 RX ADMIN — INSULIN ASPART 8 UNITS: 100 INJECTION, SOLUTION INTRAVENOUS; SUBCUTANEOUS at 11:49

## 2021-10-21 RX ADMIN — ATORVASTATIN CALCIUM 20 MG: 20 TABLET, FILM COATED ORAL at 09:20

## 2021-10-21 RX ADMIN — HYDROCODONE BITARTRATE AND ACETAMINOPHEN 1 TABLET: 5; 325 TABLET ORAL at 19:32

## 2021-10-21 RX ADMIN — NYSTATIN: 100000 POWDER TOPICAL at 17:23

## 2021-10-21 RX ADMIN — NYSTATIN: 100000 POWDER TOPICAL at 09:16

## 2021-10-21 RX ADMIN — IPRATROPIUM BROMIDE AND ALBUTEROL SULFATE 3 ML: 2.5; .5 SOLUTION RESPIRATORY (INHALATION) at 20:12

## 2021-10-21 RX ADMIN — METOPROLOL TARTRATE 50 MG: 50 TABLET, FILM COATED ORAL at 09:01

## 2021-10-21 RX ADMIN — PANTOPRAZOLE SODIUM 40 MG: 40 INJECTION, POWDER, FOR SOLUTION INTRAVENOUS at 06:07

## 2021-10-21 RX ADMIN — IPRATROPIUM BROMIDE AND ALBUTEROL SULFATE 3 ML: 2.5; .5 SOLUTION RESPIRATORY (INHALATION) at 07:21

## 2021-10-21 RX ADMIN — GABAPENTIN 400 MG: 400 CAPSULE ORAL at 17:23

## 2021-10-22 LAB
ALBUMIN SERPL-MCNC: 3.5 G/DL (ref 3.5–5.2)
ALBUMIN/GLOB SERPL: 0.8 G/DL
ALP SERPL-CCNC: 125 U/L (ref 39–117)
ALT SERPL W P-5'-P-CCNC: 11 U/L (ref 1–33)
ANION GAP SERPL CALCULATED.3IONS-SCNC: 13 MMOL/L (ref 5–15)
AST SERPL-CCNC: 14 U/L (ref 1–32)
BILIRUB SERPL-MCNC: 0.2 MG/DL (ref 0–1.2)
BUN SERPL-MCNC: 43 MG/DL (ref 8–23)
BUN/CREAT SERPL: 13.9 (ref 7–25)
CALCIUM SPEC-SCNC: 8.7 MG/DL (ref 8.6–10.5)
CHLORIDE SERPL-SCNC: 94 MMOL/L (ref 98–107)
CO2 SERPL-SCNC: 24 MMOL/L (ref 22–29)
CREAT SERPL-MCNC: 3.1 MG/DL (ref 0.57–1)
DEPRECATED RDW RBC AUTO: 48.7 FL (ref 37–54)
ERYTHROCYTE [DISTWIDTH] IN BLOOD BY AUTOMATED COUNT: 15.6 % (ref 12.3–15.4)
GFR SERPL CREATININE-BSD FRML MDRD: 15 ML/MIN/1.73
GLOBULIN UR ELPH-MCNC: 4.2 GM/DL
GLUCOSE BLDC GLUCOMTR-MCNC: 235 MG/DL (ref 70–130)
GLUCOSE BLDC GLUCOMTR-MCNC: 318 MG/DL (ref 70–130)
GLUCOSE BLDC GLUCOMTR-MCNC: 321 MG/DL (ref 70–130)
GLUCOSE BLDC GLUCOMTR-MCNC: 86 MG/DL (ref 70–130)
GLUCOSE SERPL-MCNC: 300 MG/DL (ref 65–99)
HCT VFR BLD AUTO: 28.9 % (ref 34–46.6)
HGB BLD-MCNC: 9 G/DL (ref 12–15.9)
MCH RBC QN AUTO: 26.9 PG (ref 26.6–33)
MCHC RBC AUTO-ENTMCNC: 31.1 G/DL (ref 31.5–35.7)
MCV RBC AUTO: 86.3 FL (ref 79–97)
PLATELET # BLD AUTO: 249 10*3/MM3 (ref 140–450)
PMV BLD AUTO: 8.8 FL (ref 6–12)
POTASSIUM SERPL-SCNC: 4.4 MMOL/L (ref 3.5–5.2)
PROT SERPL-MCNC: 7.7 G/DL (ref 6–8.5)
RBC # BLD AUTO: 3.35 10*6/MM3 (ref 3.77–5.28)
SODIUM SERPL-SCNC: 131 MMOL/L (ref 136–145)
WBC # BLD AUTO: 6.78 10*3/MM3 (ref 3.4–10.8)

## 2021-10-22 PROCEDURE — 85027 COMPLETE CBC AUTOMATED: CPT | Performed by: STUDENT IN AN ORGANIZED HEALTH CARE EDUCATION/TRAINING PROGRAM

## 2021-10-22 PROCEDURE — 63710000001 INSULIN ASPART PER 5 UNITS: Performed by: STUDENT IN AN ORGANIZED HEALTH CARE EDUCATION/TRAINING PROGRAM

## 2021-10-22 PROCEDURE — 82962 GLUCOSE BLOOD TEST: CPT

## 2021-10-22 PROCEDURE — 63710000001 ONDANSETRON PER 8 MG: Performed by: STUDENT IN AN ORGANIZED HEALTH CARE EDUCATION/TRAINING PROGRAM

## 2021-10-22 PROCEDURE — 63710000001 INSULIN DETEMIR PER 5 UNITS: Performed by: STUDENT IN AN ORGANIZED HEALTH CARE EDUCATION/TRAINING PROGRAM

## 2021-10-22 PROCEDURE — 80053 COMPREHEN METABOLIC PANEL: CPT | Performed by: STUDENT IN AN ORGANIZED HEALTH CARE EDUCATION/TRAINING PROGRAM

## 2021-10-22 PROCEDURE — 97110 THERAPEUTIC EXERCISES: CPT

## 2021-10-22 PROCEDURE — 94799 UNLISTED PULMONARY SVC/PX: CPT

## 2021-10-22 PROCEDURE — 99232 SBSQ HOSP IP/OBS MODERATE 35: CPT | Performed by: STUDENT IN AN ORGANIZED HEALTH CARE EDUCATION/TRAINING PROGRAM

## 2021-10-22 PROCEDURE — 25010000002 HEPARIN (PORCINE) PER 1000 UNITS: Performed by: INTERNAL MEDICINE

## 2021-10-22 PROCEDURE — 94760 N-INVAS EAR/PLS OXIMETRY 1: CPT

## 2021-10-22 PROCEDURE — 97535 SELF CARE MNGMENT TRAINING: CPT

## 2021-10-22 RX ORDER — LISINOPRIL 10 MG/1
10 TABLET ORAL NIGHTLY
Status: DISCONTINUED | OUTPATIENT
Start: 2021-10-22 | End: 2021-10-28 | Stop reason: HOSPADM

## 2021-10-22 RX ORDER — ALBUMIN (HUMAN) 12.5 G/50ML
12.5 SOLUTION INTRAVENOUS AS NEEDED
Status: ACTIVE | OUTPATIENT
Start: 2021-10-23 | End: 2021-10-23

## 2021-10-22 RX ORDER — BUMETANIDE 1 MG/1
2 TABLET ORAL
Status: DISCONTINUED | OUTPATIENT
Start: 2021-10-24 | End: 2021-10-28 | Stop reason: HOSPADM

## 2021-10-22 RX ORDER — HEPARIN SODIUM 1000 [USP'U]/ML
2000 INJECTION, SOLUTION INTRAVENOUS; SUBCUTANEOUS AS NEEDED
Status: DISCONTINUED | OUTPATIENT
Start: 2021-10-23 | End: 2021-10-26

## 2021-10-22 RX ORDER — HYDROCODONE BITARTRATE AND ACETAMINOPHEN 5; 325 MG/1; MG/1
1 TABLET ORAL ONCE
Status: COMPLETED | OUTPATIENT
Start: 2021-10-23 | End: 2021-10-22

## 2021-10-22 RX ADMIN — GABAPENTIN 400 MG: 400 CAPSULE ORAL at 17:03

## 2021-10-22 RX ADMIN — INSULIN DETEMIR 50 UNITS: 100 INJECTION, SOLUTION SUBCUTANEOUS at 21:47

## 2021-10-22 RX ADMIN — HYDROCODONE BITARTRATE AND ACETAMINOPHEN 1 TABLET: 5; 325 TABLET ORAL at 23:39

## 2021-10-22 RX ADMIN — INSULIN ASPART 16 UNITS: 100 INJECTION, SOLUTION INTRAVENOUS; SUBCUTANEOUS at 11:24

## 2021-10-22 RX ADMIN — CYANOCOBALAMIN TAB 1000 MCG 1000 MCG: 1000 TAB at 08:38

## 2021-10-22 RX ADMIN — LEVOTHYROXINE SODIUM 25 MCG: 25 TABLET ORAL at 06:03

## 2021-10-22 RX ADMIN — IPRATROPIUM BROMIDE AND ALBUTEROL SULFATE 3 ML: 2.5; .5 SOLUTION RESPIRATORY (INHALATION) at 11:05

## 2021-10-22 RX ADMIN — INSULIN ASPART 27 UNITS: 100 INJECTION, SOLUTION INTRAVENOUS; SUBCUTANEOUS at 11:24

## 2021-10-22 RX ADMIN — HEPARIN SODIUM 2000 UNITS: 1000 INJECTION INTRAVENOUS; SUBCUTANEOUS at 18:30

## 2021-10-22 RX ADMIN — FERROUS SULFATE TAB EC 324 MG (65 MG FE EQUIVALENT) 324 MG: 324 (65 FE) TABLET DELAYED RESPONSE at 08:38

## 2021-10-22 RX ADMIN — INSULIN DETEMIR 15 UNITS: 100 INJECTION, SOLUTION SUBCUTANEOUS at 07:18

## 2021-10-22 RX ADMIN — GABAPENTIN 400 MG: 400 CAPSULE ORAL at 08:38

## 2021-10-22 RX ADMIN — INSULIN ASPART 16 UNITS: 100 INJECTION, SOLUTION INTRAVENOUS; SUBCUTANEOUS at 08:36

## 2021-10-22 RX ADMIN — ISOSORBIDE MONONITRATE 30 MG: 30 TABLET, EXTENDED RELEASE ORAL at 06:03

## 2021-10-22 RX ADMIN — SODIUM CHLORIDE, PRESERVATIVE FREE 10 ML: 5 INJECTION INTRAVENOUS at 21:47

## 2021-10-22 RX ADMIN — LIDOCAINE 1 PATCH: 50 PATCH CUTANEOUS at 08:38

## 2021-10-22 RX ADMIN — HYDROCODONE BITARTRATE AND ACETAMINOPHEN 1 TABLET: 5; 325 TABLET ORAL at 06:03

## 2021-10-22 RX ADMIN — CETIRIZINE HYDROCHLORIDE 10 MG: 10 TABLET, FILM COATED ORAL at 08:38

## 2021-10-22 RX ADMIN — IPRATROPIUM BROMIDE AND ALBUTEROL SULFATE 3 ML: 2.5; .5 SOLUTION RESPIRATORY (INHALATION) at 19:51

## 2021-10-22 RX ADMIN — PANTOPRAZOLE SODIUM 40 MG: 40 INJECTION, POWDER, FOR SOLUTION INTRAVENOUS at 06:02

## 2021-10-22 RX ADMIN — ONDANSETRON 4 MG: 4 TABLET, FILM COATED ORAL at 17:04

## 2021-10-22 RX ADMIN — GABAPENTIN 400 MG: 400 CAPSULE ORAL at 21:46

## 2021-10-22 RX ADMIN — ATORVASTATIN CALCIUM 20 MG: 20 TABLET, FILM COATED ORAL at 08:38

## 2021-10-22 RX ADMIN — DULOXETINE HYDROCHLORIDE 20 MG: 20 CAPSULE, DELAYED RELEASE ORAL at 08:39

## 2021-10-22 RX ADMIN — NYSTATIN: 100000 POWDER TOPICAL at 21:47

## 2021-10-22 RX ADMIN — INSULIN ASPART 27 UNITS: 100 INJECTION, SOLUTION INTRAVENOUS; SUBCUTANEOUS at 08:36

## 2021-10-22 RX ADMIN — SODIUM CHLORIDE, PRESERVATIVE FREE 10 ML: 5 INJECTION INTRAVENOUS at 21:48

## 2021-10-22 RX ADMIN — METOPROLOL TARTRATE 50 MG: 50 TABLET, FILM COATED ORAL at 21:46

## 2021-10-22 RX ADMIN — LISINOPRIL 10 MG: 10 TABLET ORAL at 21:46

## 2021-10-22 RX ADMIN — HYDROCODONE BITARTRATE AND ACETAMINOPHEN 1 TABLET: 5; 325 TABLET ORAL at 18:29

## 2021-10-22 RX ADMIN — OXYBUTYNIN CHLORIDE 5 MG: 5 TABLET, EXTENDED RELEASE ORAL at 08:39

## 2021-10-22 RX ADMIN — MONTELUKAST 10 MG: 10 TABLET, FILM COATED ORAL at 21:47

## 2021-10-22 RX ADMIN — NYSTATIN: 100000 POWDER TOPICAL at 06:04

## 2021-10-22 RX ADMIN — METOPROLOL TARTRATE 50 MG: 50 TABLET, FILM COATED ORAL at 08:39

## 2021-10-22 RX ADMIN — HYDROCODONE BITARTRATE AND ACETAMINOPHEN 1 TABLET: 5; 325 TABLET ORAL at 11:23

## 2021-10-22 RX ADMIN — CANAGLIFLOZIN 100 MG: 100 TABLET, FILM COATED ORAL at 08:38

## 2021-10-23 LAB
ALBUMIN SERPL-MCNC: 3.6 G/DL (ref 3.5–5.2)
ALBUMIN/GLOB SERPL: 0.8 G/DL
ALP SERPL-CCNC: 127 U/L (ref 39–117)
ALT SERPL W P-5'-P-CCNC: 11 U/L (ref 1–33)
ANION GAP SERPL CALCULATED.3IONS-SCNC: 11 MMOL/L (ref 5–15)
AST SERPL-CCNC: 13 U/L (ref 1–32)
BILIRUB SERPL-MCNC: 0.3 MG/DL (ref 0–1.2)
BUN SERPL-MCNC: 31 MG/DL (ref 8–23)
BUN/CREAT SERPL: 10.8 (ref 7–25)
CALCIUM SPEC-SCNC: 8.9 MG/DL (ref 8.6–10.5)
CHLORIDE SERPL-SCNC: 89 MMOL/L (ref 98–107)
CO2 SERPL-SCNC: 25 MMOL/L (ref 22–29)
CREAT SERPL-MCNC: 2.88 MG/DL (ref 0.57–1)
DEPRECATED RDW RBC AUTO: 47.6 FL (ref 37–54)
ERYTHROCYTE [DISTWIDTH] IN BLOOD BY AUTOMATED COUNT: 15.4 % (ref 12.3–15.4)
GFR SERPL CREATININE-BSD FRML MDRD: 17 ML/MIN/1.73
GLOBULIN UR ELPH-MCNC: 4.4 GM/DL
GLUCOSE BLDC GLUCOMTR-MCNC: 109 MG/DL (ref 70–130)
GLUCOSE BLDC GLUCOMTR-MCNC: 174 MG/DL (ref 70–130)
GLUCOSE BLDC GLUCOMTR-MCNC: 194 MG/DL (ref 70–130)
GLUCOSE BLDC GLUCOMTR-MCNC: 336 MG/DL (ref 70–130)
GLUCOSE SERPL-MCNC: 327 MG/DL (ref 65–99)
HCT VFR BLD AUTO: 30.3 % (ref 34–46.6)
HGB BLD-MCNC: 9.6 G/DL (ref 12–15.9)
MCH RBC QN AUTO: 27 PG (ref 26.6–33)
MCHC RBC AUTO-ENTMCNC: 31.7 G/DL (ref 31.5–35.7)
MCV RBC AUTO: 85.4 FL (ref 79–97)
PLATELET # BLD AUTO: 261 10*3/MM3 (ref 140–450)
PMV BLD AUTO: 9.1 FL (ref 6–12)
POTASSIUM SERPL-SCNC: 4.8 MMOL/L (ref 3.5–5.2)
PROT SERPL-MCNC: 8 G/DL (ref 6–8.5)
RBC # BLD AUTO: 3.55 10*6/MM3 (ref 3.77–5.28)
SODIUM SERPL-SCNC: 125 MMOL/L (ref 136–145)
WBC # BLD AUTO: 10.34 10*3/MM3 (ref 3.4–10.8)

## 2021-10-23 PROCEDURE — 63710000001 INSULIN ASPART PER 5 UNITS: Performed by: STUDENT IN AN ORGANIZED HEALTH CARE EDUCATION/TRAINING PROGRAM

## 2021-10-23 PROCEDURE — 82962 GLUCOSE BLOOD TEST: CPT

## 2021-10-23 PROCEDURE — 25010000002 HEPARIN (PORCINE) PER 1000 UNITS: Performed by: INTERNAL MEDICINE

## 2021-10-23 PROCEDURE — 36415 COLL VENOUS BLD VENIPUNCTURE: CPT | Performed by: STUDENT IN AN ORGANIZED HEALTH CARE EDUCATION/TRAINING PROGRAM

## 2021-10-23 PROCEDURE — 94799 UNLISTED PULMONARY SVC/PX: CPT

## 2021-10-23 PROCEDURE — 63710000001 INSULIN DETEMIR PER 5 UNITS: Performed by: STUDENT IN AN ORGANIZED HEALTH CARE EDUCATION/TRAINING PROGRAM

## 2021-10-23 PROCEDURE — 25010000002 ONDANSETRON PER 1 MG: Performed by: STUDENT IN AN ORGANIZED HEALTH CARE EDUCATION/TRAINING PROGRAM

## 2021-10-23 PROCEDURE — 80053 COMPREHEN METABOLIC PANEL: CPT | Performed by: STUDENT IN AN ORGANIZED HEALTH CARE EDUCATION/TRAINING PROGRAM

## 2021-10-23 PROCEDURE — 99232 SBSQ HOSP IP/OBS MODERATE 35: CPT | Performed by: STUDENT IN AN ORGANIZED HEALTH CARE EDUCATION/TRAINING PROGRAM

## 2021-10-23 PROCEDURE — 85027 COMPLETE CBC AUTOMATED: CPT | Performed by: STUDENT IN AN ORGANIZED HEALTH CARE EDUCATION/TRAINING PROGRAM

## 2021-10-23 RX ORDER — HYDROCODONE BITARTRATE AND ACETAMINOPHEN 5; 325 MG/1; MG/1
1 TABLET ORAL EVERY 8 HOURS PRN
Status: DISCONTINUED | OUTPATIENT
Start: 2021-10-23 | End: 2021-10-28 | Stop reason: HOSPADM

## 2021-10-23 RX ADMIN — HYDROCODONE BITARTRATE AND ACETAMINOPHEN 1 TABLET: 5; 325 TABLET ORAL at 16:05

## 2021-10-23 RX ADMIN — INSULIN ASPART 30 UNITS: 100 INJECTION, SOLUTION INTRAVENOUS; SUBCUTANEOUS at 12:15

## 2021-10-23 RX ADMIN — IPRATROPIUM BROMIDE AND ALBUTEROL SULFATE 3 ML: 2.5; .5 SOLUTION RESPIRATORY (INHALATION) at 19:16

## 2021-10-23 RX ADMIN — LISINOPRIL 10 MG: 10 TABLET ORAL at 20:20

## 2021-10-23 RX ADMIN — INSULIN ASPART 27 UNITS: 100 INJECTION, SOLUTION INTRAVENOUS; SUBCUTANEOUS at 09:12

## 2021-10-23 RX ADMIN — ONDANSETRON 4 MG: 2 INJECTION INTRAMUSCULAR; INTRAVENOUS at 20:20

## 2021-10-23 RX ADMIN — HEPARIN SODIUM 2000 UNITS: 1000 INJECTION INTRAVENOUS; SUBCUTANEOUS at 11:19

## 2021-10-23 RX ADMIN — LEVOTHYROXINE SODIUM 25 MCG: 25 TABLET ORAL at 05:08

## 2021-10-23 RX ADMIN — INSULIN ASPART 16 UNITS: 100 INJECTION, SOLUTION INTRAVENOUS; SUBCUTANEOUS at 09:14

## 2021-10-23 RX ADMIN — MONTELUKAST 10 MG: 10 TABLET, FILM COATED ORAL at 20:20

## 2021-10-23 RX ADMIN — INSULIN DETEMIR 50 UNITS: 100 INJECTION, SOLUTION SUBCUTANEOUS at 21:44

## 2021-10-23 RX ADMIN — ONDANSETRON 4 MG: 2 INJECTION INTRAMUSCULAR; INTRAVENOUS at 09:55

## 2021-10-23 RX ADMIN — INSULIN ASPART 30 UNITS: 100 INJECTION, SOLUTION INTRAVENOUS; SUBCUTANEOUS at 17:57

## 2021-10-23 RX ADMIN — GABAPENTIN 400 MG: 400 CAPSULE ORAL at 16:05

## 2021-10-23 RX ADMIN — INSULIN DETEMIR 20 UNITS: 100 INJECTION, SOLUTION SUBCUTANEOUS at 09:11

## 2021-10-23 RX ADMIN — SODIUM CHLORIDE, PRESERVATIVE FREE 10 ML: 5 INJECTION INTRAVENOUS at 20:21

## 2021-10-23 RX ADMIN — GABAPENTIN 400 MG: 400 CAPSULE ORAL at 20:20

## 2021-10-23 RX ADMIN — ISOSORBIDE MONONITRATE 30 MG: 30 TABLET, EXTENDED RELEASE ORAL at 05:08

## 2021-10-23 RX ADMIN — PANTOPRAZOLE SODIUM 40 MG: 40 INJECTION, POWDER, FOR SOLUTION INTRAVENOUS at 05:10

## 2021-10-23 RX ADMIN — HEPARIN SODIUM 2000 UNITS: 1000 INJECTION INTRAVENOUS; SUBCUTANEOUS at 08:10

## 2021-10-23 RX ADMIN — IPRATROPIUM BROMIDE AND ALBUTEROL SULFATE 3 ML: 2.5; .5 SOLUTION RESPIRATORY (INHALATION) at 06:53

## 2021-10-23 RX ADMIN — INSULIN ASPART 4 UNITS: 100 INJECTION, SOLUTION INTRAVENOUS; SUBCUTANEOUS at 12:16

## 2021-10-23 RX ADMIN — METOPROLOL TARTRATE 50 MG: 50 TABLET, FILM COATED ORAL at 20:20

## 2021-10-23 RX ADMIN — LIDOCAINE 1 PATCH: 50 PATCH CUTANEOUS at 14:23

## 2021-10-23 RX ADMIN — HEPARIN SODIUM 2000 UNITS: 1000 INJECTION INTRAVENOUS; SUBCUTANEOUS at 11:18

## 2021-10-24 PROBLEM — Z79.4 TYPE 2 DIABETES MELLITUS WITH DIABETIC NEPHROPATHY, WITH LONG-TERM CURRENT USE OF INSULIN: Chronic | Status: ACTIVE | Noted: 2021-03-31

## 2021-10-24 PROBLEM — E11.21 TYPE 2 DIABETES MELLITUS WITH DIABETIC NEPHROPATHY, WITH LONG-TERM CURRENT USE OF INSULIN: Chronic | Status: ACTIVE | Noted: 2021-03-31

## 2021-10-24 PROBLEM — N34.2 INFECTIVE URETHRITIS: Status: ACTIVE | Noted: 2021-10-24

## 2021-10-24 PROBLEM — E11.21 TYPE 2 DIABETES MELLITUS WITH DIABETIC NEPHROPATHY: Status: ACTIVE | Noted: 2021-03-31

## 2021-10-24 PROBLEM — Z98.890 HISTORY OF INSERTION OF TUNNELED CENTRAL VENOUS CATHETER (CVC) WITH PORT: Status: ACTIVE | Noted: 2021-10-24

## 2021-10-24 PROBLEM — N30.01 ACUTE CYSTITIS WITH HEMATURIA: Status: ACTIVE | Noted: 2021-10-24

## 2021-10-24 LAB
ALBUMIN SERPL-MCNC: 3.4 G/DL (ref 3.5–5.2)
ALBUMIN/GLOB SERPL: 0.7 G/DL
ALP SERPL-CCNC: 138 U/L (ref 39–117)
ALT SERPL W P-5'-P-CCNC: 10 U/L (ref 1–33)
ANION GAP SERPL CALCULATED.3IONS-SCNC: 11 MMOL/L (ref 5–15)
AST SERPL-CCNC: 13 U/L (ref 1–32)
BACTERIA UR QL AUTO: ABNORMAL /HPF
BILIRUB SERPL-MCNC: 0.2 MG/DL (ref 0–1.2)
BILIRUB UR QL STRIP: NEGATIVE
BUN SERPL-MCNC: 25 MG/DL (ref 8–23)
BUN/CREAT SERPL: 8 (ref 7–25)
CALCIUM SPEC-SCNC: 9.1 MG/DL (ref 8.6–10.5)
CHLORIDE SERPL-SCNC: 89 MMOL/L (ref 98–107)
CLARITY UR: ABNORMAL
CO2 SERPL-SCNC: 26 MMOL/L (ref 22–29)
COLOR UR: YELLOW
CREAT SERPL-MCNC: 3.13 MG/DL (ref 0.57–1)
DEPRECATED RDW RBC AUTO: 49 FL (ref 37–54)
ERYTHROCYTE [DISTWIDTH] IN BLOOD BY AUTOMATED COUNT: 15.6 % (ref 12.3–15.4)
GFR SERPL CREATININE-BSD FRML MDRD: 15 ML/MIN/1.73
GLOBULIN UR ELPH-MCNC: 4.9 GM/DL
GLUCOSE BLDC GLUCOMTR-MCNC: 143 MG/DL (ref 70–130)
GLUCOSE BLDC GLUCOMTR-MCNC: 169 MG/DL (ref 70–130)
GLUCOSE BLDC GLUCOMTR-MCNC: 238 MG/DL (ref 70–130)
GLUCOSE BLDC GLUCOMTR-MCNC: 313 MG/DL (ref 70–130)
GLUCOSE SERPL-MCNC: 300 MG/DL (ref 65–99)
GLUCOSE UR STRIP-MCNC: ABNORMAL MG/DL
HCT VFR BLD AUTO: 30.3 % (ref 34–46.6)
HGB BLD-MCNC: 9.5 G/DL (ref 12–15.9)
HGB UR QL STRIP.AUTO: ABNORMAL
HYALINE CASTS UR QL AUTO: ABNORMAL /LPF
KETONES UR QL STRIP: NEGATIVE
LEUKOCYTE ESTERASE UR QL STRIP.AUTO: ABNORMAL
MCH RBC QN AUTO: 26.9 PG (ref 26.6–33)
MCHC RBC AUTO-ENTMCNC: 31.4 G/DL (ref 31.5–35.7)
MCV RBC AUTO: 85.8 FL (ref 79–97)
NITRITE UR QL STRIP: NEGATIVE
PH UR STRIP.AUTO: 7 [PH] (ref 5–9)
PLATELET # BLD AUTO: 247 10*3/MM3 (ref 140–450)
PMV BLD AUTO: 9.1 FL (ref 6–12)
POTASSIUM SERPL-SCNC: 4.4 MMOL/L (ref 3.5–5.2)
PROT SERPL-MCNC: 8.3 G/DL (ref 6–8.5)
PROT UR QL STRIP: ABNORMAL
RBC # BLD AUTO: 3.53 10*6/MM3 (ref 3.77–5.28)
RBC # UR: ABNORMAL /HPF
REF LAB TEST METHOD: ABNORMAL
SODIUM SERPL-SCNC: 126 MMOL/L (ref 136–145)
SP GR UR STRIP: 1.01 (ref 1–1.03)
SQUAMOUS #/AREA URNS HPF: ABNORMAL /HPF
UROBILINOGEN UR QL STRIP: ABNORMAL
WBC # BLD AUTO: 10.52 10*3/MM3 (ref 3.4–10.8)
WBC UR QL AUTO: ABNORMAL /HPF

## 2021-10-24 PROCEDURE — 63710000001 INSULIN DETEMIR PER 5 UNITS: Performed by: STUDENT IN AN ORGANIZED HEALTH CARE EDUCATION/TRAINING PROGRAM

## 2021-10-24 PROCEDURE — 97116 GAIT TRAINING THERAPY: CPT

## 2021-10-24 PROCEDURE — 63710000001 INSULIN ASPART PER 5 UNITS: Performed by: STUDENT IN AN ORGANIZED HEALTH CARE EDUCATION/TRAINING PROGRAM

## 2021-10-24 PROCEDURE — 94799 UNLISTED PULMONARY SVC/PX: CPT

## 2021-10-24 PROCEDURE — 87086 URINE CULTURE/COLONY COUNT: CPT | Performed by: STUDENT IN AN ORGANIZED HEALTH CARE EDUCATION/TRAINING PROGRAM

## 2021-10-24 PROCEDURE — 97110 THERAPEUTIC EXERCISES: CPT

## 2021-10-24 PROCEDURE — 85027 COMPLETE CBC AUTOMATED: CPT | Performed by: STUDENT IN AN ORGANIZED HEALTH CARE EDUCATION/TRAINING PROGRAM

## 2021-10-24 PROCEDURE — 80053 COMPREHEN METABOLIC PANEL: CPT | Performed by: STUDENT IN AN ORGANIZED HEALTH CARE EDUCATION/TRAINING PROGRAM

## 2021-10-24 PROCEDURE — 99232 SBSQ HOSP IP/OBS MODERATE 35: CPT | Performed by: STUDENT IN AN ORGANIZED HEALTH CARE EDUCATION/TRAINING PROGRAM

## 2021-10-24 PROCEDURE — 82962 GLUCOSE BLOOD TEST: CPT

## 2021-10-24 PROCEDURE — 87077 CULTURE AEROBIC IDENTIFY: CPT | Performed by: STUDENT IN AN ORGANIZED HEALTH CARE EDUCATION/TRAINING PROGRAM

## 2021-10-24 PROCEDURE — 97530 THERAPEUTIC ACTIVITIES: CPT

## 2021-10-24 PROCEDURE — 87186 SC STD MICRODIL/AGAR DIL: CPT | Performed by: STUDENT IN AN ORGANIZED HEALTH CARE EDUCATION/TRAINING PROGRAM

## 2021-10-24 PROCEDURE — 81001 URINALYSIS AUTO W/SCOPE: CPT | Performed by: STUDENT IN AN ORGANIZED HEALTH CARE EDUCATION/TRAINING PROGRAM

## 2021-10-24 RX ORDER — LIDOCAINE 50 MG/G
1 PATCH TOPICAL EVERY 24 HOURS
Status: DISCONTINUED | OUTPATIENT
Start: 2021-10-24 | End: 2021-10-28 | Stop reason: HOSPADM

## 2021-10-24 RX ORDER — CEFDINIR 300 MG/1
300 CAPSULE ORAL
Status: DISCONTINUED | OUTPATIENT
Start: 2021-10-24 | End: 2021-10-25

## 2021-10-24 RX ADMIN — INSULIN ASPART 30 UNITS: 100 INJECTION, SOLUTION INTRAVENOUS; SUBCUTANEOUS at 17:51

## 2021-10-24 RX ADMIN — NYSTATIN: 100000 POWDER TOPICAL at 22:31

## 2021-10-24 RX ADMIN — INSULIN ASPART 8 UNITS: 100 INJECTION, SOLUTION INTRAVENOUS; SUBCUTANEOUS at 11:45

## 2021-10-24 RX ADMIN — DULOXETINE HYDROCHLORIDE 20 MG: 20 CAPSULE, DELAYED RELEASE ORAL at 08:33

## 2021-10-24 RX ADMIN — ATORVASTATIN CALCIUM 20 MG: 20 TABLET, FILM COATED ORAL at 08:34

## 2021-10-24 RX ADMIN — HYDROCODONE BITARTRATE AND ACETAMINOPHEN 1 TABLET: 5; 325 TABLET ORAL at 22:31

## 2021-10-24 RX ADMIN — NYSTATIN: 100000 POWDER TOPICAL at 16:31

## 2021-10-24 RX ADMIN — GABAPENTIN 400 MG: 400 CAPSULE ORAL at 21:07

## 2021-10-24 RX ADMIN — CYANOCOBALAMIN TAB 1000 MCG 1000 MCG: 1000 TAB at 08:34

## 2021-10-24 RX ADMIN — CANAGLIFLOZIN 100 MG: 100 TABLET, FILM COATED ORAL at 08:33

## 2021-10-24 RX ADMIN — ISOSORBIDE MONONITRATE 30 MG: 30 TABLET, EXTENDED RELEASE ORAL at 06:03

## 2021-10-24 RX ADMIN — INSULIN ASPART 30 UNITS: 100 INJECTION, SOLUTION INTRAVENOUS; SUBCUTANEOUS at 11:45

## 2021-10-24 RX ADMIN — SODIUM CHLORIDE, PRESERVATIVE FREE 10 ML: 5 INJECTION INTRAVENOUS at 22:33

## 2021-10-24 RX ADMIN — MONTELUKAST 10 MG: 10 TABLET, FILM COATED ORAL at 21:07

## 2021-10-24 RX ADMIN — LEVOTHYROXINE SODIUM 25 MCG: 25 TABLET ORAL at 06:03

## 2021-10-24 RX ADMIN — HYDROCODONE BITARTRATE AND ACETAMINOPHEN 1 TABLET: 5; 325 TABLET ORAL at 13:56

## 2021-10-24 RX ADMIN — INSULIN DETEMIR 50 UNITS: 100 INJECTION, SOLUTION SUBCUTANEOUS at 21:07

## 2021-10-24 RX ADMIN — METOPROLOL TARTRATE 50 MG: 50 TABLET, FILM COATED ORAL at 08:33

## 2021-10-24 RX ADMIN — IPRATROPIUM BROMIDE AND ALBUTEROL SULFATE 3 ML: 2.5; .5 SOLUTION RESPIRATORY (INHALATION) at 19:23

## 2021-10-24 RX ADMIN — LISINOPRIL 10 MG: 10 TABLET ORAL at 21:07

## 2021-10-24 RX ADMIN — INSULIN ASPART 16 UNITS: 100 INJECTION, SOLUTION INTRAVENOUS; SUBCUTANEOUS at 08:35

## 2021-10-24 RX ADMIN — GABAPENTIN 400 MG: 400 CAPSULE ORAL at 08:34

## 2021-10-24 RX ADMIN — LIDOCAINE 1 PATCH: 50 PATCH CUTANEOUS at 13:56

## 2021-10-24 RX ADMIN — INSULIN ASPART 30 UNITS: 100 INJECTION, SOLUTION INTRAVENOUS; SUBCUTANEOUS at 08:33

## 2021-10-24 RX ADMIN — NYSTATIN: 100000 POWDER TOPICAL at 08:35

## 2021-10-24 RX ADMIN — SODIUM CHLORIDE, PRESERVATIVE FREE 10 ML: 5 INJECTION INTRAVENOUS at 22:32

## 2021-10-24 RX ADMIN — HYDROCODONE BITARTRATE AND ACETAMINOPHEN 1 TABLET: 5; 325 TABLET ORAL at 06:03

## 2021-10-24 RX ADMIN — GABAPENTIN 400 MG: 400 CAPSULE ORAL at 16:28

## 2021-10-24 RX ADMIN — CETIRIZINE HYDROCHLORIDE 10 MG: 10 TABLET, FILM COATED ORAL at 08:34

## 2021-10-24 RX ADMIN — FERROUS SULFATE TAB EC 324 MG (65 MG FE EQUIVALENT) 324 MG: 324 (65 FE) TABLET DELAYED RESPONSE at 08:34

## 2021-10-24 RX ADMIN — IPRATROPIUM BROMIDE AND ALBUTEROL SULFATE 3 ML: 2.5; .5 SOLUTION RESPIRATORY (INHALATION) at 14:24

## 2021-10-24 RX ADMIN — METOPROLOL TARTRATE 50 MG: 50 TABLET, FILM COATED ORAL at 21:07

## 2021-10-24 RX ADMIN — INSULIN DETEMIR 20 UNITS: 100 INJECTION, SOLUTION SUBCUTANEOUS at 06:44

## 2021-10-24 RX ADMIN — OXYBUTYNIN CHLORIDE 5 MG: 5 TABLET, EXTENDED RELEASE ORAL at 08:34

## 2021-10-24 RX ADMIN — PANTOPRAZOLE SODIUM 40 MG: 40 INJECTION, POWDER, FOR SOLUTION INTRAVENOUS at 06:04

## 2021-10-24 RX ADMIN — CEFDINIR 300 MG: 300 CAPSULE ORAL at 13:56

## 2021-10-24 RX ADMIN — LIDOCAINE 1 PATCH: 50 PATCH CUTANEOUS at 08:32

## 2021-10-24 RX ADMIN — BUMETANIDE 2 MG: 1 TABLET ORAL at 08:33

## 2021-10-24 RX ADMIN — IPRATROPIUM BROMIDE AND ALBUTEROL SULFATE 3 ML: 2.5; .5 SOLUTION RESPIRATORY (INHALATION) at 06:55

## 2021-10-24 RX ADMIN — IPRATROPIUM BROMIDE AND ALBUTEROL SULFATE 3 ML: 2.5; .5 SOLUTION RESPIRATORY (INHALATION) at 11:05

## 2021-10-25 LAB
ALBUMIN SERPL-MCNC: 3.4 G/DL (ref 3.5–5.2)
ALBUMIN/GLOB SERPL: 0.8 G/DL
ALP SERPL-CCNC: 129 U/L (ref 39–117)
ALT SERPL W P-5'-P-CCNC: 10 U/L (ref 1–33)
ANION GAP SERPL CALCULATED.3IONS-SCNC: 13 MMOL/L (ref 5–15)
AST SERPL-CCNC: 13 U/L (ref 1–32)
BILIRUB SERPL-MCNC: 0.2 MG/DL (ref 0–1.2)
BUN SERPL-MCNC: 37 MG/DL (ref 8–23)
BUN/CREAT SERPL: 9.5 (ref 7–25)
CALCIUM SPEC-SCNC: 9 MG/DL (ref 8.6–10.5)
CHLORIDE SERPL-SCNC: 88 MMOL/L (ref 98–107)
CO2 SERPL-SCNC: 23 MMOL/L (ref 22–29)
CREAT SERPL-MCNC: 3.88 MG/DL (ref 0.57–1)
DEPRECATED RDW RBC AUTO: 46.1 FL (ref 37–54)
ERYTHROCYTE [DISTWIDTH] IN BLOOD BY AUTOMATED COUNT: 15.3 % (ref 12.3–15.4)
GFR SERPL CREATININE-BSD FRML MDRD: 12 ML/MIN/1.73
GFR SERPL CREATININE-BSD FRML MDRD: ABNORMAL ML/MIN/{1.73_M2}
GLOBULIN UR ELPH-MCNC: 4.1 GM/DL
GLUCOSE BLDC GLUCOMTR-MCNC: 167 MG/DL (ref 70–130)
GLUCOSE BLDC GLUCOMTR-MCNC: 184 MG/DL (ref 70–130)
GLUCOSE BLDC GLUCOMTR-MCNC: 245 MG/DL (ref 70–130)
GLUCOSE BLDC GLUCOMTR-MCNC: 370 MG/DL (ref 70–130)
GLUCOSE SERPL-MCNC: 252 MG/DL (ref 65–99)
HCT VFR BLD AUTO: 28.6 % (ref 34–46.6)
HGB BLD-MCNC: 9.2 G/DL (ref 12–15.9)
MCH RBC QN AUTO: 27.1 PG (ref 26.6–33)
MCHC RBC AUTO-ENTMCNC: 32.2 G/DL (ref 31.5–35.7)
MCV RBC AUTO: 84.1 FL (ref 79–97)
PLATELET # BLD AUTO: 241 10*3/MM3 (ref 140–450)
PMV BLD AUTO: 8.8 FL (ref 6–12)
POTASSIUM SERPL-SCNC: 4.8 MMOL/L (ref 3.5–5.2)
PROT SERPL-MCNC: 7.5 G/DL (ref 6–8.5)
QT INTERVAL: 434 MS
QT INTERVAL: 498 MS
QTC INTERVAL: 475 MS
QTC INTERVAL: 529 MS
RBC # BLD AUTO: 3.4 10*6/MM3 (ref 3.77–5.28)
SODIUM SERPL-SCNC: 124 MMOL/L (ref 136–145)
WBC # BLD AUTO: 10.78 10*3/MM3 (ref 3.4–10.8)

## 2021-10-25 PROCEDURE — 80053 COMPREHEN METABOLIC PANEL: CPT | Performed by: STUDENT IN AN ORGANIZED HEALTH CARE EDUCATION/TRAINING PROGRAM

## 2021-10-25 PROCEDURE — 94799 UNLISTED PULMONARY SVC/PX: CPT

## 2021-10-25 PROCEDURE — 63710000001 INSULIN DETEMIR PER 5 UNITS: Performed by: STUDENT IN AN ORGANIZED HEALTH CARE EDUCATION/TRAINING PROGRAM

## 2021-10-25 PROCEDURE — 63710000001 INSULIN ASPART PER 5 UNITS: Performed by: STUDENT IN AN ORGANIZED HEALTH CARE EDUCATION/TRAINING PROGRAM

## 2021-10-25 PROCEDURE — 25010000002 HEPARIN (PORCINE) PER 1000 UNITS: Performed by: INTERNAL MEDICINE

## 2021-10-25 PROCEDURE — 97110 THERAPEUTIC EXERCISES: CPT

## 2021-10-25 PROCEDURE — 97530 THERAPEUTIC ACTIVITIES: CPT

## 2021-10-25 PROCEDURE — 82962 GLUCOSE BLOOD TEST: CPT

## 2021-10-25 PROCEDURE — 97116 GAIT TRAINING THERAPY: CPT

## 2021-10-25 PROCEDURE — 97535 SELF CARE MNGMENT TRAINING: CPT

## 2021-10-25 PROCEDURE — 85027 COMPLETE CBC AUTOMATED: CPT | Performed by: STUDENT IN AN ORGANIZED HEALTH CARE EDUCATION/TRAINING PROGRAM

## 2021-10-25 PROCEDURE — 99232 SBSQ HOSP IP/OBS MODERATE 35: CPT | Performed by: STUDENT IN AN ORGANIZED HEALTH CARE EDUCATION/TRAINING PROGRAM

## 2021-10-25 RX ORDER — BISACODYL 5 MG/1
5 TABLET, DELAYED RELEASE ORAL DAILY PRN
Status: DISCONTINUED | OUTPATIENT
Start: 2021-10-25 | End: 2021-10-26

## 2021-10-25 RX ORDER — CEFDINIR 300 MG/1
300 CAPSULE ORAL
Status: CANCELLED | OUTPATIENT
Start: 2021-10-25 | End: 2021-11-02

## 2021-10-25 RX ORDER — POLYETHYLENE GLYCOL 3350 17 G/17G
17 POWDER, FOR SOLUTION ORAL DAILY PRN
Status: DISCONTINUED | OUTPATIENT
Start: 2021-10-25 | End: 2021-10-26

## 2021-10-25 RX ORDER — ALBUMIN (HUMAN) 12.5 G/50ML
12.5 SOLUTION INTRAVENOUS AS NEEDED
Status: ACTIVE | OUTPATIENT
Start: 2021-10-25 | End: 2021-10-25

## 2021-10-25 RX ORDER — HEPARIN SODIUM 1000 [USP'U]/ML
2000 INJECTION, SOLUTION INTRAVENOUS; SUBCUTANEOUS AS NEEDED
Status: DISCONTINUED | OUTPATIENT
Start: 2021-10-25 | End: 2021-10-28 | Stop reason: HOSPADM

## 2021-10-25 RX ORDER — CEFDINIR 300 MG/1
300 CAPSULE ORAL
Status: DISCONTINUED | OUTPATIENT
Start: 2021-10-26 | End: 2021-10-26

## 2021-10-25 RX ORDER — BISACODYL 10 MG
10 SUPPOSITORY, RECTAL RECTAL DAILY PRN
Status: DISCONTINUED | OUTPATIENT
Start: 2021-10-25 | End: 2021-10-26

## 2021-10-25 RX ORDER — AMOXICILLIN 250 MG
2 CAPSULE ORAL 2 TIMES DAILY
Status: DISCONTINUED | OUTPATIENT
Start: 2021-10-25 | End: 2021-10-26

## 2021-10-25 RX ADMIN — MONTELUKAST 10 MG: 10 TABLET, FILM COATED ORAL at 20:03

## 2021-10-25 RX ADMIN — IPRATROPIUM BROMIDE AND ALBUTEROL SULFATE 3 ML: 2.5; .5 SOLUTION RESPIRATORY (INHALATION) at 19:38

## 2021-10-25 RX ADMIN — INSULIN ASPART 30 UNITS: 100 INJECTION, SOLUTION INTRAVENOUS; SUBCUTANEOUS at 11:30

## 2021-10-25 RX ADMIN — INSULIN ASPART 8 UNITS: 100 INJECTION, SOLUTION INTRAVENOUS; SUBCUTANEOUS at 08:35

## 2021-10-25 RX ADMIN — LISINOPRIL 10 MG: 10 TABLET ORAL at 20:03

## 2021-10-25 RX ADMIN — HEPARIN SODIUM 2000 UNITS: 1000 INJECTION INTRAVENOUS; SUBCUTANEOUS at 17:00

## 2021-10-25 RX ADMIN — LIDOCAINE 1 PATCH: 50 PATCH CUTANEOUS at 08:29

## 2021-10-25 RX ADMIN — GABAPENTIN 400 MG: 400 CAPSULE ORAL at 08:31

## 2021-10-25 RX ADMIN — INSULIN DETEMIR 20 UNITS: 100 INJECTION, SOLUTION SUBCUTANEOUS at 08:32

## 2021-10-25 RX ADMIN — INSULIN ASPART 20 UNITS: 100 INJECTION, SOLUTION INTRAVENOUS; SUBCUTANEOUS at 11:30

## 2021-10-25 RX ADMIN — OXYBUTYNIN CHLORIDE 5 MG: 5 TABLET, EXTENDED RELEASE ORAL at 08:31

## 2021-10-25 RX ADMIN — HEPARIN SODIUM 2000 UNITS: 1000 INJECTION INTRAVENOUS; SUBCUTANEOUS at 17:14

## 2021-10-25 RX ADMIN — CANAGLIFLOZIN 100 MG: 100 TABLET, FILM COATED ORAL at 08:31

## 2021-10-25 RX ADMIN — DULOXETINE HYDROCHLORIDE 20 MG: 20 CAPSULE, DELAYED RELEASE ORAL at 08:31

## 2021-10-25 RX ADMIN — ATORVASTATIN CALCIUM 20 MG: 20 TABLET, FILM COATED ORAL at 08:31

## 2021-10-25 RX ADMIN — ISOSORBIDE MONONITRATE 30 MG: 30 TABLET, EXTENDED RELEASE ORAL at 05:29

## 2021-10-25 RX ADMIN — INSULIN ASPART 30 UNITS: 100 INJECTION, SOLUTION INTRAVENOUS; SUBCUTANEOUS at 17:40

## 2021-10-25 RX ADMIN — IPRATROPIUM BROMIDE AND ALBUTEROL SULFATE 3 ML: 2.5; .5 SOLUTION RESPIRATORY (INHALATION) at 07:29

## 2021-10-25 RX ADMIN — METOPROLOL TARTRATE 50 MG: 50 TABLET, FILM COATED ORAL at 20:03

## 2021-10-25 RX ADMIN — DOCUSATE SODIUM 50 MG AND SENNOSIDES 8.6 MG 2 TABLET: 8.6; 5 TABLET, FILM COATED ORAL at 20:03

## 2021-10-25 RX ADMIN — HYDROCODONE BITARTRATE AND ACETAMINOPHEN 1 TABLET: 5; 325 TABLET ORAL at 18:34

## 2021-10-25 RX ADMIN — INSULIN ASPART 4 UNITS: 100 INJECTION, SOLUTION INTRAVENOUS; SUBCUTANEOUS at 17:41

## 2021-10-25 RX ADMIN — FERROUS SULFATE TAB EC 324 MG (65 MG FE EQUIVALENT) 324 MG: 324 (65 FE) TABLET DELAYED RESPONSE at 08:31

## 2021-10-25 RX ADMIN — SODIUM CHLORIDE, PRESERVATIVE FREE 10 ML: 5 INJECTION INTRAVENOUS at 20:05

## 2021-10-25 RX ADMIN — INSULIN DETEMIR 50 UNITS: 100 INJECTION, SOLUTION SUBCUTANEOUS at 20:01

## 2021-10-25 RX ADMIN — IPRATROPIUM BROMIDE AND ALBUTEROL SULFATE 3 ML: 2.5; .5 SOLUTION RESPIRATORY (INHALATION) at 10:48

## 2021-10-25 RX ADMIN — PANTOPRAZOLE SODIUM 40 MG: 40 INJECTION, POWDER, FOR SOLUTION INTRAVENOUS at 05:29

## 2021-10-25 RX ADMIN — NYSTATIN: 100000 POWDER TOPICAL at 08:36

## 2021-10-25 RX ADMIN — INSULIN ASPART 30 UNITS: 100 INJECTION, SOLUTION INTRAVENOUS; SUBCUTANEOUS at 08:33

## 2021-10-25 RX ADMIN — IPRATROPIUM BROMIDE AND ALBUTEROL SULFATE 3 ML: 2.5; .5 SOLUTION RESPIRATORY (INHALATION) at 14:25

## 2021-10-25 RX ADMIN — GABAPENTIN 400 MG: 400 CAPSULE ORAL at 20:03

## 2021-10-25 RX ADMIN — LEVOTHYROXINE SODIUM 25 MCG: 25 TABLET ORAL at 05:29

## 2021-10-25 RX ADMIN — CETIRIZINE HYDROCHLORIDE 10 MG: 10 TABLET, FILM COATED ORAL at 08:32

## 2021-10-25 RX ADMIN — CYANOCOBALAMIN TAB 1000 MCG 1000 MCG: 1000 TAB at 08:31

## 2021-10-26 PROBLEM — N39.0 URINARY TRACT INFECTION DUE TO PROTEUS: Status: ACTIVE | Noted: 2021-10-24

## 2021-10-26 PROBLEM — B96.4 URINARY TRACT INFECTION DUE TO PROTEUS: Status: ACTIVE | Noted: 2021-10-24

## 2021-10-26 PROBLEM — K92.2 GI BLEED: Status: RESOLVED | Noted: 2021-05-13 | Resolved: 2021-10-26

## 2021-10-26 LAB
ALBUMIN SERPL-MCNC: 3.5 G/DL (ref 3.5–5.2)
ALBUMIN/GLOB SERPL: 0.8 G/DL
ALP SERPL-CCNC: 132 U/L (ref 39–117)
ALT SERPL W P-5'-P-CCNC: 10 U/L (ref 1–33)
ANION GAP SERPL CALCULATED.3IONS-SCNC: 14 MMOL/L (ref 5–15)
AST SERPL-CCNC: 12 U/L (ref 1–32)
BACTERIA SPEC AEROBE CULT: ABNORMAL
BACTERIA SPEC AEROBE CULT: ABNORMAL
BILIRUB SERPL-MCNC: 0.2 MG/DL (ref 0–1.2)
BUN SERPL-MCNC: 24 MG/DL (ref 8–23)
BUN/CREAT SERPL: 8.2 (ref 7–25)
CALCIUM SPEC-SCNC: 9.3 MG/DL (ref 8.6–10.5)
CHLORIDE SERPL-SCNC: 91 MMOL/L (ref 98–107)
CO2 SERPL-SCNC: 25 MMOL/L (ref 22–29)
CREAT SERPL-MCNC: 2.92 MG/DL (ref 0.57–1)
DEPRECATED RDW RBC AUTO: 44.8 FL (ref 37–54)
ERYTHROCYTE [DISTWIDTH] IN BLOOD BY AUTOMATED COUNT: 14.8 % (ref 12.3–15.4)
GFR SERPL CREATININE-BSD FRML MDRD: 16 ML/MIN/1.73
GLOBULIN UR ELPH-MCNC: 4.3 GM/DL
GLUCOSE BLDC GLUCOMTR-MCNC: 106 MG/DL (ref 70–130)
GLUCOSE BLDC GLUCOMTR-MCNC: 172 MG/DL (ref 70–130)
GLUCOSE BLDC GLUCOMTR-MCNC: 180 MG/DL (ref 70–130)
GLUCOSE BLDC GLUCOMTR-MCNC: 290 MG/DL (ref 70–130)
GLUCOSE BLDC GLUCOMTR-MCNC: 355 MG/DL (ref 70–130)
GLUCOSE SERPL-MCNC: 288 MG/DL (ref 65–99)
HBA1C MFR BLD: 8.8 % (ref 4.8–5.6)
HCT VFR BLD AUTO: 30.1 % (ref 34–46.6)
HGB BLD-MCNC: 9.7 G/DL (ref 12–15.9)
MCH RBC QN AUTO: 26.8 PG (ref 26.6–33)
MCHC RBC AUTO-ENTMCNC: 32.2 G/DL (ref 31.5–35.7)
MCV RBC AUTO: 83.1 FL (ref 79–97)
PLATELET # BLD AUTO: 254 10*3/MM3 (ref 140–450)
PMV BLD AUTO: 8.8 FL (ref 6–12)
POTASSIUM SERPL-SCNC: 4.3 MMOL/L (ref 3.5–5.2)
PROT SERPL-MCNC: 7.8 G/DL (ref 6–8.5)
RBC # BLD AUTO: 3.62 10*6/MM3 (ref 3.77–5.28)
SODIUM SERPL-SCNC: 130 MMOL/L (ref 136–145)
WBC # BLD AUTO: 8.89 10*3/MM3 (ref 3.4–10.8)

## 2021-10-26 PROCEDURE — 25010000002 ONDANSETRON PER 1 MG: Performed by: STUDENT IN AN ORGANIZED HEALTH CARE EDUCATION/TRAINING PROGRAM

## 2021-10-26 PROCEDURE — 99232 SBSQ HOSP IP/OBS MODERATE 35: CPT | Performed by: STUDENT IN AN ORGANIZED HEALTH CARE EDUCATION/TRAINING PROGRAM

## 2021-10-26 PROCEDURE — 97530 THERAPEUTIC ACTIVITIES: CPT

## 2021-10-26 PROCEDURE — 82962 GLUCOSE BLOOD TEST: CPT

## 2021-10-26 PROCEDURE — 63710000001 INSULIN ASPART PER 5 UNITS: Performed by: STUDENT IN AN ORGANIZED HEALTH CARE EDUCATION/TRAINING PROGRAM

## 2021-10-26 PROCEDURE — 63710000001 INSULIN DETEMIR PER 5 UNITS: Performed by: STUDENT IN AN ORGANIZED HEALTH CARE EDUCATION/TRAINING PROGRAM

## 2021-10-26 PROCEDURE — 85027 COMPLETE CBC AUTOMATED: CPT | Performed by: STUDENT IN AN ORGANIZED HEALTH CARE EDUCATION/TRAINING PROGRAM

## 2021-10-26 PROCEDURE — 94799 UNLISTED PULMONARY SVC/PX: CPT

## 2021-10-26 PROCEDURE — 83036 HEMOGLOBIN GLYCOSYLATED A1C: CPT | Performed by: FAMILY MEDICINE

## 2021-10-26 PROCEDURE — 97535 SELF CARE MNGMENT TRAINING: CPT

## 2021-10-26 PROCEDURE — 80053 COMPREHEN METABOLIC PANEL: CPT | Performed by: STUDENT IN AN ORGANIZED HEALTH CARE EDUCATION/TRAINING PROGRAM

## 2021-10-26 PROCEDURE — 94760 N-INVAS EAR/PLS OXIMETRY 1: CPT

## 2021-10-26 PROCEDURE — 97116 GAIT TRAINING THERAPY: CPT

## 2021-10-26 RX ORDER — ALBUMIN (HUMAN) 12.5 G/50ML
12.5 SOLUTION INTRAVENOUS AS NEEDED
Status: ACTIVE | OUTPATIENT
Start: 2021-10-27 | End: 2021-10-27

## 2021-10-26 RX ORDER — AMOXICILLIN 250 MG
2 CAPSULE ORAL 2 TIMES DAILY
Status: DISCONTINUED | OUTPATIENT
Start: 2021-10-26 | End: 2021-10-28 | Stop reason: HOSPADM

## 2021-10-26 RX ORDER — CEFDINIR 300 MG/1
300 CAPSULE ORAL
Status: DISCONTINUED | OUTPATIENT
Start: 2021-10-27 | End: 2021-10-28

## 2021-10-26 RX ORDER — POLYETHYLENE GLYCOL 3350 17 G/17G
17 POWDER, FOR SOLUTION ORAL 2 TIMES DAILY
Status: DISCONTINUED | OUTPATIENT
Start: 2021-10-26 | End: 2021-10-28 | Stop reason: HOSPADM

## 2021-10-26 RX ORDER — POLYETHYLENE GLYCOL 3350 17 G/17G
17 POWDER, FOR SOLUTION ORAL DAILY
Status: DISCONTINUED | OUTPATIENT
Start: 2021-10-26 | End: 2021-10-26

## 2021-10-26 RX ORDER — HEPARIN SODIUM 1000 [USP'U]/ML
2000 INJECTION, SOLUTION INTRAVENOUS; SUBCUTANEOUS AS NEEDED
Status: DISCONTINUED | OUTPATIENT
Start: 2021-10-27 | End: 2021-10-28 | Stop reason: HOSPADM

## 2021-10-26 RX ADMIN — LIDOCAINE 1 PATCH: 50 PATCH CUTANEOUS at 14:14

## 2021-10-26 RX ADMIN — LIDOCAINE 1 PATCH: 50 PATCH CUTANEOUS at 08:24

## 2021-10-26 RX ADMIN — IPRATROPIUM BROMIDE AND ALBUTEROL SULFATE 3 ML: 2.5; .5 SOLUTION RESPIRATORY (INHALATION) at 19:13

## 2021-10-26 RX ADMIN — INSULIN ASPART 20 UNITS: 100 INJECTION, SOLUTION INTRAVENOUS; SUBCUTANEOUS at 11:38

## 2021-10-26 RX ADMIN — METOPROLOL TARTRATE 50 MG: 50 TABLET, FILM COATED ORAL at 20:42

## 2021-10-26 RX ADMIN — INSULIN DETEMIR 20 UNITS: 100 INJECTION, SOLUTION SUBCUTANEOUS at 08:34

## 2021-10-26 RX ADMIN — ISOSORBIDE MONONITRATE 30 MG: 30 TABLET, EXTENDED RELEASE ORAL at 05:30

## 2021-10-26 RX ADMIN — SODIUM CHLORIDE, PRESERVATIVE FREE 10 ML: 5 INJECTION INTRAVENOUS at 21:31

## 2021-10-26 RX ADMIN — LISINOPRIL 10 MG: 10 TABLET ORAL at 20:42

## 2021-10-26 RX ADMIN — CANAGLIFLOZIN 100 MG: 100 TABLET, FILM COATED ORAL at 08:24

## 2021-10-26 RX ADMIN — ONDANSETRON 4 MG: 2 INJECTION INTRAMUSCULAR; INTRAVENOUS at 12:05

## 2021-10-26 RX ADMIN — GABAPENTIN 400 MG: 400 CAPSULE ORAL at 17:41

## 2021-10-26 RX ADMIN — GABAPENTIN 400 MG: 400 CAPSULE ORAL at 20:42

## 2021-10-26 RX ADMIN — CETIRIZINE HYDROCHLORIDE 10 MG: 10 TABLET, FILM COATED ORAL at 08:24

## 2021-10-26 RX ADMIN — INSULIN ASPART 30 UNITS: 100 INJECTION, SOLUTION INTRAVENOUS; SUBCUTANEOUS at 11:39

## 2021-10-26 RX ADMIN — IPRATROPIUM BROMIDE AND ALBUTEROL SULFATE 3 ML: 2.5; .5 SOLUTION RESPIRATORY (INHALATION) at 10:17

## 2021-10-26 RX ADMIN — MONTELUKAST 10 MG: 10 TABLET, FILM COATED ORAL at 20:42

## 2021-10-26 RX ADMIN — HYDROCODONE BITARTRATE AND ACETAMINOPHEN 1 TABLET: 5; 325 TABLET ORAL at 08:23

## 2021-10-26 RX ADMIN — FERROUS SULFATE TAB EC 324 MG (65 MG FE EQUIVALENT) 324 MG: 324 (65 FE) TABLET DELAYED RESPONSE at 08:24

## 2021-10-26 RX ADMIN — CEFDINIR 300 MG: 300 CAPSULE ORAL at 14:08

## 2021-10-26 RX ADMIN — INSULIN ASPART 12 UNITS: 100 INJECTION, SOLUTION INTRAVENOUS; SUBCUTANEOUS at 08:34

## 2021-10-26 RX ADMIN — IPRATROPIUM BROMIDE AND ALBUTEROL SULFATE 3 ML: 2.5; .5 SOLUTION RESPIRATORY (INHALATION) at 14:54

## 2021-10-26 RX ADMIN — BUMETANIDE 2 MG: 1 TABLET ORAL at 08:24

## 2021-10-26 RX ADMIN — CYANOCOBALAMIN TAB 1000 MCG 1000 MCG: 1000 TAB at 08:24

## 2021-10-26 RX ADMIN — IPRATROPIUM BROMIDE AND ALBUTEROL SULFATE 3 ML: 2.5; .5 SOLUTION RESPIRATORY (INHALATION) at 06:55

## 2021-10-26 RX ADMIN — ATORVASTATIN CALCIUM 20 MG: 20 TABLET, FILM COATED ORAL at 08:24

## 2021-10-26 RX ADMIN — INSULIN ASPART 30 UNITS: 100 INJECTION, SOLUTION INTRAVENOUS; SUBCUTANEOUS at 08:36

## 2021-10-26 RX ADMIN — GABAPENTIN 400 MG: 400 CAPSULE ORAL at 08:23

## 2021-10-26 RX ADMIN — HYDROCODONE BITARTRATE AND ACETAMINOPHEN 1 TABLET: 5; 325 TABLET ORAL at 23:24

## 2021-10-26 RX ADMIN — INSULIN DETEMIR 50 UNITS: 100 INJECTION, SOLUTION SUBCUTANEOUS at 20:42

## 2021-10-26 RX ADMIN — DULOXETINE HYDROCHLORIDE 20 MG: 20 CAPSULE, DELAYED RELEASE ORAL at 08:24

## 2021-10-26 RX ADMIN — LEVOTHYROXINE SODIUM 25 MCG: 25 TABLET ORAL at 05:30

## 2021-10-26 RX ADMIN — DOCUSATE SODIUM 50 MG AND SENNOSIDES 8.6 MG 2 TABLET: 8.6; 5 TABLET, FILM COATED ORAL at 08:24

## 2021-10-26 RX ADMIN — METOPROLOL TARTRATE 50 MG: 50 TABLET, FILM COATED ORAL at 08:24

## 2021-10-26 RX ADMIN — NYSTATIN: 100000 POWDER TOPICAL at 20:52

## 2021-10-26 RX ADMIN — OXYBUTYNIN CHLORIDE 5 MG: 5 TABLET, EXTENDED RELEASE ORAL at 08:24

## 2021-10-27 ENCOUNTER — DOCUMENTATION (OUTPATIENT)
Dept: FAMILY MEDICINE CLINIC | Facility: CLINIC | Age: 61
End: 2021-10-27

## 2021-10-27 LAB
ALBUMIN SERPL-MCNC: 3.2 G/DL (ref 3.5–5.2)
ALBUMIN/GLOB SERPL: 0.7 G/DL
ALP SERPL-CCNC: 127 U/L (ref 39–117)
ALT SERPL W P-5'-P-CCNC: 11 U/L (ref 1–33)
ANION GAP SERPL CALCULATED.3IONS-SCNC: 13 MMOL/L (ref 5–15)
AST SERPL-CCNC: 14 U/L (ref 1–32)
BILIRUB SERPL-MCNC: 0.2 MG/DL (ref 0–1.2)
BUN SERPL-MCNC: 37 MG/DL (ref 8–23)
BUN/CREAT SERPL: 10.5 (ref 7–25)
CALCIUM SPEC-SCNC: 9.6 MG/DL (ref 8.6–10.5)
CHLORIDE SERPL-SCNC: 90 MMOL/L (ref 98–107)
CO2 SERPL-SCNC: 23 MMOL/L (ref 22–29)
CREAT SERPL-MCNC: 3.51 MG/DL (ref 0.57–1)
DEPRECATED RDW RBC AUTO: 46.2 FL (ref 37–54)
ERYTHROCYTE [DISTWIDTH] IN BLOOD BY AUTOMATED COUNT: 15.2 % (ref 12.3–15.4)
GFR SERPL CREATININE-BSD FRML MDRD: 13 ML/MIN/1.73
GFR SERPL CREATININE-BSD FRML MDRD: ABNORMAL ML/MIN/{1.73_M2}
GLOBULIN UR ELPH-MCNC: 4.5 GM/DL
GLUCOSE BLDC GLUCOMTR-MCNC: 124 MG/DL (ref 70–130)
GLUCOSE BLDC GLUCOMTR-MCNC: 128 MG/DL (ref 70–130)
GLUCOSE BLDC GLUCOMTR-MCNC: 231 MG/DL (ref 70–130)
GLUCOSE BLDC GLUCOMTR-MCNC: 258 MG/DL (ref 70–130)
GLUCOSE BLDC GLUCOMTR-MCNC: 334 MG/DL (ref 70–130)
GLUCOSE SERPL-MCNC: 244 MG/DL (ref 65–99)
HCT VFR BLD AUTO: 29 % (ref 34–46.6)
HGB BLD-MCNC: 9.3 G/DL (ref 12–15.9)
MCH RBC QN AUTO: 26.7 PG (ref 26.6–33)
MCHC RBC AUTO-ENTMCNC: 32.1 G/DL (ref 31.5–35.7)
MCV RBC AUTO: 83.3 FL (ref 79–97)
PLATELET # BLD AUTO: 263 10*3/MM3 (ref 140–450)
PMV BLD AUTO: 8.9 FL (ref 6–12)
POTASSIUM SERPL-SCNC: 4.8 MMOL/L (ref 3.5–5.2)
PROT SERPL-MCNC: 7.7 G/DL (ref 6–8.5)
RBC # BLD AUTO: 3.48 10*6/MM3 (ref 3.77–5.28)
SARS-COV-2 N GENE RESP QL NAA+PROBE: NOT DETECTED
SODIUM SERPL-SCNC: 126 MMOL/L (ref 136–145)
WBC # BLD AUTO: 7.4 10*3/MM3 (ref 3.4–10.8)

## 2021-10-27 PROCEDURE — 63710000001 INSULIN DETEMIR PER 5 UNITS: Performed by: STUDENT IN AN ORGANIZED HEALTH CARE EDUCATION/TRAINING PROGRAM

## 2021-10-27 PROCEDURE — 94799 UNLISTED PULMONARY SVC/PX: CPT

## 2021-10-27 PROCEDURE — 63710000001 INSULIN ASPART PER 5 UNITS: Performed by: STUDENT IN AN ORGANIZED HEALTH CARE EDUCATION/TRAINING PROGRAM

## 2021-10-27 PROCEDURE — 99231 SBSQ HOSP IP/OBS SF/LOW 25: CPT | Performed by: STUDENT IN AN ORGANIZED HEALTH CARE EDUCATION/TRAINING PROGRAM

## 2021-10-27 PROCEDURE — 82962 GLUCOSE BLOOD TEST: CPT

## 2021-10-27 PROCEDURE — 97110 THERAPEUTIC EXERCISES: CPT

## 2021-10-27 PROCEDURE — 85027 COMPLETE CBC AUTOMATED: CPT | Performed by: STUDENT IN AN ORGANIZED HEALTH CARE EDUCATION/TRAINING PROGRAM

## 2021-10-27 PROCEDURE — 80053 COMPREHEN METABOLIC PANEL: CPT | Performed by: STUDENT IN AN ORGANIZED HEALTH CARE EDUCATION/TRAINING PROGRAM

## 2021-10-27 PROCEDURE — 25010000002 HEPARIN (PORCINE) PER 1000 UNITS: Performed by: INTERNAL MEDICINE

## 2021-10-27 PROCEDURE — 87635 SARS-COV-2 COVID-19 AMP PRB: CPT | Performed by: STUDENT IN AN ORGANIZED HEALTH CARE EDUCATION/TRAINING PROGRAM

## 2021-10-27 RX ADMIN — IPRATROPIUM BROMIDE AND ALBUTEROL SULFATE 3 ML: 2.5; .5 SOLUTION RESPIRATORY (INHALATION) at 06:49

## 2021-10-27 RX ADMIN — INSULIN ASPART 30 UNITS: 100 INJECTION, SOLUTION INTRAVENOUS; SUBCUTANEOUS at 12:28

## 2021-10-27 RX ADMIN — LIDOCAINE 1 PATCH: 50 PATCH CUTANEOUS at 08:35

## 2021-10-27 RX ADMIN — IPRATROPIUM BROMIDE AND ALBUTEROL SULFATE 3 ML: 2.5; .5 SOLUTION RESPIRATORY (INHALATION) at 20:34

## 2021-10-27 RX ADMIN — CYANOCOBALAMIN TAB 1000 MCG 1000 MCG: 1000 TAB at 12:21

## 2021-10-27 RX ADMIN — INSULIN ASPART 16 UNITS: 100 INJECTION, SOLUTION INTRAVENOUS; SUBCUTANEOUS at 08:36

## 2021-10-27 RX ADMIN — INSULIN ASPART 8 UNITS: 100 INJECTION, SOLUTION INTRAVENOUS; SUBCUTANEOUS at 12:30

## 2021-10-27 RX ADMIN — SODIUM CHLORIDE, PRESERVATIVE FREE 10 ML: 5 INJECTION INTRAVENOUS at 21:45

## 2021-10-27 RX ADMIN — DULOXETINE HYDROCHLORIDE 20 MG: 20 CAPSULE, DELAYED RELEASE ORAL at 12:21

## 2021-10-27 RX ADMIN — HEPARIN SODIUM 2000 UNITS: 1000 INJECTION INTRAVENOUS; SUBCUTANEOUS at 11:51

## 2021-10-27 RX ADMIN — INSULIN ASPART 30 UNITS: 100 INJECTION, SOLUTION INTRAVENOUS; SUBCUTANEOUS at 08:35

## 2021-10-27 RX ADMIN — METOPROLOL TARTRATE 50 MG: 50 TABLET, FILM COATED ORAL at 20:10

## 2021-10-27 RX ADMIN — CEFDINIR 300 MG: 300 CAPSULE ORAL at 14:41

## 2021-10-27 RX ADMIN — CETIRIZINE HYDROCHLORIDE 10 MG: 10 TABLET, FILM COATED ORAL at 12:22

## 2021-10-27 RX ADMIN — ATORVASTATIN CALCIUM 20 MG: 20 TABLET, FILM COATED ORAL at 12:22

## 2021-10-27 RX ADMIN — IPRATROPIUM BROMIDE AND ALBUTEROL SULFATE 3 ML: 2.5; .5 SOLUTION RESPIRATORY (INHALATION) at 14:12

## 2021-10-27 RX ADMIN — GABAPENTIN 400 MG: 400 CAPSULE ORAL at 12:21

## 2021-10-27 RX ADMIN — HYDROCODONE BITARTRATE AND ACETAMINOPHEN 1 TABLET: 5; 325 TABLET ORAL at 16:20

## 2021-10-27 RX ADMIN — OXYBUTYNIN CHLORIDE 5 MG: 5 TABLET, EXTENDED RELEASE ORAL at 12:23

## 2021-10-27 RX ADMIN — INSULIN DETEMIR 25 UNITS: 100 INJECTION, SOLUTION SUBCUTANEOUS at 20:10

## 2021-10-27 RX ADMIN — PANTOPRAZOLE SODIUM 40 MG: 40 INJECTION, POWDER, FOR SOLUTION INTRAVENOUS at 04:54

## 2021-10-27 RX ADMIN — NYSTATIN: 100000 POWDER TOPICAL at 23:37

## 2021-10-27 RX ADMIN — MONTELUKAST 10 MG: 10 TABLET, FILM COATED ORAL at 20:10

## 2021-10-27 RX ADMIN — ISOSORBIDE MONONITRATE 30 MG: 30 TABLET, EXTENDED RELEASE ORAL at 04:54

## 2021-10-27 RX ADMIN — LEVOTHYROXINE SODIUM 25 MCG: 25 TABLET ORAL at 04:54

## 2021-10-27 RX ADMIN — GABAPENTIN 400 MG: 400 CAPSULE ORAL at 20:10

## 2021-10-27 RX ADMIN — INSULIN DETEMIR 20 UNITS: 100 INJECTION, SOLUTION SUBCUTANEOUS at 08:35

## 2021-10-27 RX ADMIN — FERROUS SULFATE TAB EC 324 MG (65 MG FE EQUIVALENT) 324 MG: 324 (65 FE) TABLET DELAYED RESPONSE at 12:23

## 2021-10-27 RX ADMIN — CANAGLIFLOZIN 100 MG: 100 TABLET, FILM COATED ORAL at 12:22

## 2021-10-27 RX ADMIN — GABAPENTIN 400 MG: 400 CAPSULE ORAL at 16:20

## 2021-10-27 RX ADMIN — INSULIN DETEMIR 25 UNITS: 100 INJECTION, SOLUTION SUBCUTANEOUS at 20:09

## 2021-10-27 RX ADMIN — LISINOPRIL 10 MG: 10 TABLET ORAL at 20:10

## 2021-10-27 NOTE — PROGRESS NOTES
Rec'd call from physician requesting assistance with transportation.  Pt is in the hospital and will be going to [facility] when she is d/c'd.  Pt receives HD and will need transportation to/from HD once she is home from rehab.   Per physician, pt has 2 cars registered to her household, although they are not in working order.  Advised will not be able to access PACs until she notifies her county that the cars are not drivable and she turns in her registrations back to the county.  Should call her county for exact instructions.      In addition, advised physician that [employer] often offers some transportation, so LCSW will contact them to find out if she has that benefit.     Called [employer] and spoke with Ngozi (call ref # [id number]).  Confirmed that pt has 12 one/way (6 RT) rides/calendar year, not to exceed 50 miles per ride.   Ngozi unable to see if pt had used any of her rides.  Provided phone # for [employer] who handles transportation benefits for [employer] ([phone]).  LCSW called [employer], however, was not able to speak with a representative to determine if pt has used any of her transportation benefit for this year.      Advised physician and LCSW will follow up with SNF once pt is admitted to provide above info.                 This document has been electronically signed by Katy Kim LCSW on October 27, 2021 14:22 CDT

## 2021-10-28 VITALS
HEIGHT: 62 IN | BODY MASS INDEX: 52.37 KG/M2 | RESPIRATION RATE: 18 BRPM | SYSTOLIC BLOOD PRESSURE: 150 MMHG | OXYGEN SATURATION: 96 % | TEMPERATURE: 98 F | WEIGHT: 284.6 LBS | DIASTOLIC BLOOD PRESSURE: 67 MMHG | HEART RATE: 65 BPM

## 2021-10-28 PROBLEM — D62 ACUTE BLOOD LOSS ANEMIA: Status: ACTIVE | Noted: 2017-04-16

## 2021-10-28 PROBLEM — Z99.2 ANEMIA DUE TO CHRONIC KIDNEY DISEASE, ON CHRONIC DIALYSIS: Status: ACTIVE | Noted: 2017-04-16

## 2021-10-28 PROBLEM — Z99.2 ANEMIA DUE TO CHRONIC KIDNEY DISEASE, ON CHRONIC DIALYSIS: Status: ACTIVE | Noted: 2021-10-28

## 2021-10-28 PROBLEM — D62 ACUTE BLOOD LOSS ANEMIA: Status: RESOLVED | Noted: 2017-04-16 | Resolved: 2021-10-28

## 2021-10-28 PROBLEM — D63.1 ANEMIA DUE TO CHRONIC KIDNEY DISEASE, ON CHRONIC DIALYSIS: Status: ACTIVE | Noted: 2017-04-16

## 2021-10-28 PROBLEM — N18.6 ANEMIA DUE TO CHRONIC KIDNEY DISEASE, ON CHRONIC DIALYSIS: Status: ACTIVE | Noted: 2021-10-28

## 2021-10-28 PROBLEM — N18.6 ANEMIA DUE TO CHRONIC KIDNEY DISEASE, ON CHRONIC DIALYSIS: Status: ACTIVE | Noted: 2017-04-16

## 2021-10-28 PROBLEM — D63.1 ANEMIA DUE TO CHRONIC KIDNEY DISEASE, ON CHRONIC DIALYSIS: Status: ACTIVE | Noted: 2021-10-28

## 2021-10-28 LAB
ALBUMIN SERPL-MCNC: 3.2 G/DL (ref 3.5–5.2)
ALBUMIN/GLOB SERPL: 0.7 G/DL
ALP SERPL-CCNC: 139 U/L (ref 39–117)
ALT SERPL W P-5'-P-CCNC: 14 U/L (ref 1–33)
ANION GAP SERPL CALCULATED.3IONS-SCNC: 11 MMOL/L (ref 5–15)
AST SERPL-CCNC: 20 U/L (ref 1–32)
BILIRUB SERPL-MCNC: 0.2 MG/DL (ref 0–1.2)
BUN SERPL-MCNC: 26 MG/DL (ref 8–23)
BUN/CREAT SERPL: 9 (ref 7–25)
CALCIUM SPEC-SCNC: 9.1 MG/DL (ref 8.6–10.5)
CHLORIDE SERPL-SCNC: 93 MMOL/L (ref 98–107)
CO2 SERPL-SCNC: 26 MMOL/L (ref 22–29)
CREAT SERPL-MCNC: 2.89 MG/DL (ref 0.57–1)
DEPRECATED RDW RBC AUTO: 46.3 FL (ref 37–54)
ERYTHROCYTE [DISTWIDTH] IN BLOOD BY AUTOMATED COUNT: 15.2 % (ref 12.3–15.4)
GFR SERPL CREATININE-BSD FRML MDRD: 17 ML/MIN/1.73
GLOBULIN UR ELPH-MCNC: 4.4 GM/DL
GLUCOSE BLDC GLUCOMTR-MCNC: 215 MG/DL (ref 70–130)
GLUCOSE BLDC GLUCOMTR-MCNC: 224 MG/DL (ref 70–130)
GLUCOSE BLDC GLUCOMTR-MCNC: 239 MG/DL (ref 70–130)
GLUCOSE SERPL-MCNC: 259 MG/DL (ref 65–99)
HCT VFR BLD AUTO: 28.9 % (ref 34–46.6)
HGB BLD-MCNC: 9.2 G/DL (ref 12–15.9)
MCH RBC QN AUTO: 26.9 PG (ref 26.6–33)
MCHC RBC AUTO-ENTMCNC: 31.8 G/DL (ref 31.5–35.7)
MCV RBC AUTO: 84.5 FL (ref 79–97)
PLATELET # BLD AUTO: 266 10*3/MM3 (ref 140–450)
PMV BLD AUTO: 8.6 FL (ref 6–12)
POTASSIUM SERPL-SCNC: 5 MMOL/L (ref 3.5–5.2)
PROT SERPL-MCNC: 7.6 G/DL (ref 6–8.5)
RBC # BLD AUTO: 3.42 10*6/MM3 (ref 3.77–5.28)
SODIUM SERPL-SCNC: 130 MMOL/L (ref 136–145)
WBC # BLD AUTO: 7.63 10*3/MM3 (ref 3.4–10.8)

## 2021-10-28 PROCEDURE — 80053 COMPREHEN METABOLIC PANEL: CPT | Performed by: STUDENT IN AN ORGANIZED HEALTH CARE EDUCATION/TRAINING PROGRAM

## 2021-10-28 PROCEDURE — 97116 GAIT TRAINING THERAPY: CPT

## 2021-10-28 PROCEDURE — 94664 DEMO&/EVAL PT USE INHALER: CPT

## 2021-10-28 PROCEDURE — 94760 N-INVAS EAR/PLS OXIMETRY 1: CPT

## 2021-10-28 PROCEDURE — 99239 HOSP IP/OBS DSCHRG MGMT >30: CPT | Performed by: STUDENT IN AN ORGANIZED HEALTH CARE EDUCATION/TRAINING PROGRAM

## 2021-10-28 PROCEDURE — 94799 UNLISTED PULMONARY SVC/PX: CPT

## 2021-10-28 PROCEDURE — 82962 GLUCOSE BLOOD TEST: CPT

## 2021-10-28 PROCEDURE — 97530 THERAPEUTIC ACTIVITIES: CPT

## 2021-10-28 PROCEDURE — 97110 THERAPEUTIC EXERCISES: CPT

## 2021-10-28 PROCEDURE — 85027 COMPLETE CBC AUTOMATED: CPT | Performed by: STUDENT IN AN ORGANIZED HEALTH CARE EDUCATION/TRAINING PROGRAM

## 2021-10-28 PROCEDURE — 63710000001 INSULIN DETEMIR PER 5 UNITS: Performed by: STUDENT IN AN ORGANIZED HEALTH CARE EDUCATION/TRAINING PROGRAM

## 2021-10-28 PROCEDURE — 63710000001 INSULIN ASPART PER 5 UNITS: Performed by: STUDENT IN AN ORGANIZED HEALTH CARE EDUCATION/TRAINING PROGRAM

## 2021-10-28 PROCEDURE — 36415 COLL VENOUS BLD VENIPUNCTURE: CPT | Performed by: STUDENT IN AN ORGANIZED HEALTH CARE EDUCATION/TRAINING PROGRAM

## 2021-10-28 RX ORDER — CEFDINIR 300 MG/1
300 CAPSULE ORAL
Qty: 2 CAPSULE | Refills: 0 | Status: SHIPPED | OUTPATIENT
Start: 2021-10-29 | End: 2021-11-01

## 2021-10-28 RX ORDER — GABAPENTIN 400 MG/1
400 CAPSULE ORAL 3 TIMES DAILY
Qty: 90 CAPSULE | Refills: 0 | Status: SHIPPED | OUTPATIENT
Start: 2021-10-28 | End: 2021-12-01

## 2021-10-28 RX ORDER — INSULIN DEGLUDEC INJECTION 100 U/ML
50 INJECTION, SOLUTION SUBCUTANEOUS NIGHTLY
Qty: 15 ML | Refills: 10 | Status: SHIPPED | OUTPATIENT
Start: 2021-10-28 | End: 2021-11-15 | Stop reason: HOSPADM

## 2021-10-28 RX ORDER — CEFDINIR 300 MG/1
300 CAPSULE ORAL
Status: DISCONTINUED | OUTPATIENT
Start: 2021-10-29 | End: 2021-10-28 | Stop reason: HOSPADM

## 2021-10-28 RX ORDER — GABAPENTIN 400 MG/1
400 CAPSULE ORAL 3 TIMES DAILY
Qty: 90 CAPSULE | Refills: 0 | Status: SHIPPED | OUTPATIENT
Start: 2021-10-28 | End: 2021-10-28

## 2021-10-28 RX ORDER — ALBUTEROL SULFATE 2.5 MG/3ML
2.5 SOLUTION RESPIRATORY (INHALATION) EVERY 4 HOURS PRN
Qty: 75 ML | Refills: 3 | Status: SHIPPED | OUTPATIENT
Start: 2021-10-28

## 2021-10-28 RX ORDER — BUMETANIDE 1 MG/1
2 TABLET ORAL
Qty: 60 TABLET | Refills: 0 | Status: CANCELLED | OUTPATIENT
Start: 2021-10-28

## 2021-10-28 RX ORDER — BUMETANIDE 2 MG/1
2 TABLET ORAL
Qty: 30 TABLET | Refills: 0 | Status: SHIPPED | OUTPATIENT
Start: 2021-10-30 | End: 2021-12-15 | Stop reason: HOSPADM

## 2021-10-28 RX ORDER — DULAGLUTIDE 0.75 MG/.5ML
0.75 INJECTION, SOLUTION SUBCUTANEOUS WEEKLY
Qty: 2 ML | Refills: 6 | Status: SHIPPED | OUTPATIENT
Start: 2021-10-28 | End: 2021-11-15 | Stop reason: HOSPADM

## 2021-10-28 RX ORDER — CEFDINIR 300 MG/1
300 CAPSULE ORAL
Qty: 2 CAPSULE | Refills: 0 | Status: CANCELLED | OUTPATIENT
Start: 2021-10-29 | End: 2021-11-01

## 2021-10-28 RX ORDER — AMOXICILLIN 250 MG
2 CAPSULE ORAL 2 TIMES DAILY
Qty: 60 TABLET | Refills: 2 | Status: SHIPPED | OUTPATIENT
Start: 2021-10-28 | End: 2022-04-15

## 2021-10-28 RX ADMIN — ISOSORBIDE MONONITRATE 30 MG: 30 TABLET, EXTENDED RELEASE ORAL at 06:33

## 2021-10-28 RX ADMIN — ALBUTEROL SULFATE 2.5 MG: 2.5 SOLUTION RESPIRATORY (INHALATION) at 07:42

## 2021-10-28 RX ADMIN — IPRATROPIUM BROMIDE AND ALBUTEROL SULFATE 3 ML: 2.5; .5 SOLUTION RESPIRATORY (INHALATION) at 11:22

## 2021-10-28 RX ADMIN — BUMETANIDE 2 MG: 1 TABLET ORAL at 08:07

## 2021-10-28 RX ADMIN — OXYBUTYNIN CHLORIDE 5 MG: 5 TABLET, EXTENDED RELEASE ORAL at 08:07

## 2021-10-28 RX ADMIN — IPRATROPIUM BROMIDE AND ALBUTEROL SULFATE 3 ML: 2.5; .5 SOLUTION RESPIRATORY (INHALATION) at 14:56

## 2021-10-28 RX ADMIN — INSULIN ASPART 12 UNITS: 100 INJECTION, SOLUTION INTRAVENOUS; SUBCUTANEOUS at 08:11

## 2021-10-28 RX ADMIN — INSULIN DETEMIR 20 UNITS: 100 INJECTION, SOLUTION SUBCUTANEOUS at 08:07

## 2021-10-28 RX ADMIN — PANTOPRAZOLE SODIUM 40 MG: 40 INJECTION, POWDER, FOR SOLUTION INTRAVENOUS at 06:33

## 2021-10-28 RX ADMIN — HYDROCODONE BITARTRATE AND ACETAMINOPHEN 1 TABLET: 5; 325 TABLET ORAL at 08:20

## 2021-10-28 RX ADMIN — INSULIN ASPART 30 UNITS: 100 INJECTION, SOLUTION INTRAVENOUS; SUBCUTANEOUS at 11:45

## 2021-10-28 RX ADMIN — LEVOTHYROXINE SODIUM 25 MCG: 25 TABLET ORAL at 06:33

## 2021-10-28 RX ADMIN — CETIRIZINE HYDROCHLORIDE 10 MG: 10 TABLET, FILM COATED ORAL at 08:07

## 2021-10-28 RX ADMIN — DULOXETINE HYDROCHLORIDE 20 MG: 20 CAPSULE, DELAYED RELEASE ORAL at 08:07

## 2021-10-28 RX ADMIN — METOPROLOL TARTRATE 50 MG: 50 TABLET, FILM COATED ORAL at 08:07

## 2021-10-28 RX ADMIN — INSULIN ASPART 8 UNITS: 100 INJECTION, SOLUTION INTRAVENOUS; SUBCUTANEOUS at 11:46

## 2021-10-28 RX ADMIN — GABAPENTIN 400 MG: 400 CAPSULE ORAL at 08:07

## 2021-10-28 RX ADMIN — LIDOCAINE 1 PATCH: 50 PATCH CUTANEOUS at 08:07

## 2021-10-28 RX ADMIN — INSULIN ASPART 30 UNITS: 100 INJECTION, SOLUTION INTRAVENOUS; SUBCUTANEOUS at 08:10

## 2021-10-28 RX ADMIN — ATORVASTATIN CALCIUM 20 MG: 20 TABLET, FILM COATED ORAL at 08:07

## 2021-10-28 RX ADMIN — CANAGLIFLOZIN 100 MG: 100 TABLET, FILM COATED ORAL at 08:07

## 2021-10-28 RX ADMIN — FERROUS SULFATE TAB EC 324 MG (65 MG FE EQUIVALENT) 324 MG: 324 (65 FE) TABLET DELAYED RESPONSE at 08:07

## 2021-10-29 ENCOUNTER — DOCUMENTATION (OUTPATIENT)
Dept: FAMILY MEDICINE CLINIC | Facility: CLINIC | Age: 61
End: 2021-10-29

## 2021-10-29 ENCOUNTER — NURSING HOME (OUTPATIENT)
Dept: FAMILY MEDICINE CLINIC | Facility: CLINIC | Age: 61
End: 2021-10-29

## 2021-10-29 DIAGNOSIS — J42 CHRONIC BRONCHITIS, UNSPECIFIED CHRONIC BRONCHITIS TYPE (HCC): ICD-10-CM

## 2021-10-29 DIAGNOSIS — Z99.2 ANEMIA DUE TO CHRONIC KIDNEY DISEASE, ON CHRONIC DIALYSIS (HCC): ICD-10-CM

## 2021-10-29 DIAGNOSIS — N18.5 CKD (CHRONIC KIDNEY DISEASE), SYMPTOM MANAGEMENT ONLY, STAGE 5 (HCC): ICD-10-CM

## 2021-10-29 DIAGNOSIS — E11.21 TYPE 2 DIABETES MELLITUS WITH DIABETIC NEPHROPATHY, UNSPECIFIED WHETHER LONG TERM INSULIN USE (HCC): Chronic | ICD-10-CM

## 2021-10-29 DIAGNOSIS — J96.12 CHRONIC HYPERCAPNIC RESPIRATORY FAILURE (HCC): ICD-10-CM

## 2021-10-29 DIAGNOSIS — N18.6 ANEMIA DUE TO CHRONIC KIDNEY DISEASE, ON CHRONIC DIALYSIS (HCC): ICD-10-CM

## 2021-10-29 DIAGNOSIS — I25.110 CORONARY ARTERY DISEASE INVOLVING NATIVE CORONARY ARTERY OF NATIVE HEART WITH UNSTABLE ANGINA PECTORIS (HCC): ICD-10-CM

## 2021-10-29 DIAGNOSIS — D63.1 ANEMIA DUE TO CHRONIC KIDNEY DISEASE, ON CHRONIC DIALYSIS (HCC): ICD-10-CM

## 2021-10-29 DIAGNOSIS — I73.9 PERIPHERAL VASCULAR DISEASE (HCC): ICD-10-CM

## 2021-10-29 PROCEDURE — 99306 1ST NF CARE HIGH MDM 50: CPT | Performed by: STUDENT IN AN ORGANIZED HEALTH CARE EDUCATION/TRAINING PROGRAM

## 2021-10-29 NOTE — PROGRESS NOTES
Family Medicine Residency  Alvarado Mauricio MD    Subjective:     Yamileth Slater is a 61 y.o. female with a concurrent medical history of hypertension, coronary artery disease, hyperlipidemia, diabetes mellitus, CKD stage IV, COPD, and chronic respiratory failure on 3 L oxygen at home who presents for nursing home visit.    The patient was admitted to the hospital recently for a GI bleed and was diagnosed with small nonbleeding intestinal AVMs with a plan by GI to follow-up outpatient after patient given transfusion in the hospital.    The patient also had elevated creatinine during her hospitalization and nephrology has started hemodialysis.  The patient says she is now receiving this and says that sometimes she has issues with her blood pressure during treatments but otherwise has no concerns.    She was recently discharged from the hospital to [facility] nursing home.     Today, the patient's vital signs were stable.  She had no acute complaints.  The nursing staff did not have any concerns.    The following portions of the patient's history were reviewed and updated as appropriate: allergies, current medications, past family history, past medical history, past social history, past surgical history and problem list.    Past Medical Hx:  Past Medical History:   Diagnosis Date   • Acute blood loss anemia 4/16/2017    Likely due to gastric oozing at this time. - Dr. Duarte (GI) was consulted and has now signed off, will follow up outpatient - pill colonoscopy showed AVMs - continue to monitor   • Anxiety    • Carotid artery stenosis    • Chronic obstructive lung disease (HCC)    • CKD (chronic kidney disease) stage 4, GFR 15-29 ml/min ([facility])    • Colonic polyp    • Coronary arteriosclerosis    • Diabetes mellitus (HCC)    • Diabetic neuropathy (HCC)    • Ear pain, right 10/18/2021    - canal trauma due to patient scratching and DMT2 - added cortisporin ear drops   • Elevated troponin  10/12/2021    -most likely from CKD -Trending down -Neg chest pain   • GERD (gastroesophageal reflux disease)    • GI bleed 5/13/2021    - GI will follow up outpatient - Protonix 40mg daily - Avoid medical DVT prophy and use mechanical at this time instead. - Continue to monitor - pill colonoscopy results showed AVMs   • History of transfusion    • Hypercholesterolemia    • Hypertension    • Hypomagnesemia 6/27/2021    Monitor and replace   • Morbid obesity (HCC)    • Nephrolithiasis    • Peripheral vascular disease (HCC)    • Sleep apnea    • Substance abuse (HCC)    • Vitamin D deficiency        Past Surgical Hx:  Past Surgical History:   Procedure Laterality Date   • CARDIAC CATHETERIZATION N/A 7/14/2020   • CARDIAC CATHETERIZATION N/A 4/23/2021    Procedure: Left Heart Cath;  Surgeon: Melba Romo MD;  Location: [facility] CATH INVASIVE LOCATION;  Service: Cardiology;  Laterality: N/A;   • CARDIAC CATHETERIZATION N/A 4/30/2021    Procedure: Percutaneous Coronary Intervention;  Surgeon: Russell Voss MD;  Location: [facility] CATH INVASIVE LOCATION;  Service: Cardiovascular;  Laterality: N/A;   • CARDIAC CATHETERIZATION N/A 4/30/2021    Procedure: Stent NIKKI coronary;  Surgeon: Russell Voss MD;  Location: [facility] CATH INVASIVE LOCATION;  Service: Cardiovascular;  Laterality: N/A;   • CAROTID STENT Left    • COLONOSCOPY     • COLONOSCOPY N/A 5/14/2021    Procedure: COLONOSCOPY;  Surgeon: Mingo Duarte MD;  Location: [facility] ENDOSCOPY;  Service: Gastroenterology;  Laterality: N/A;   • CORONARY ARTERY BYPASS GRAFT N/A 2013    CABG X 3   • CYSTOSCOPY BLADDER STONE LITHOTRIPSY Bilateral    • ENDOSCOPY N/A 4/12/2021    Procedure: ESOPHAGOGASTRODUODENOSCOPY;  Surgeon: Mingo Duarte MD;  Location: [facility] ENDOSCOPY;  Service: Gastroenterology;  Laterality: N/A;   • ENDOSCOPY N/A 5/14/2021    Procedure: ESOPHAGOGASTRODUODENOSCOPY;  Surgeon: Mingo Duarte MD;  Location: [facility] ENDOSCOPY;   Service: Gastroenterology;  Laterality: N/A;   • INTERVENTIONAL RADIOLOGY PROCEDURE N/A 10/21/2021    Procedure: tunneled central venous catheter placement;  Surgeon: Donnie Robles MD;  Location: [facility] ANGIO INVASIVE LOCATION;  Service: Interventional Radiology;  Laterality: N/A;       Current Meds:    Current Outpatient Medications:   •  albuterol (PROVENTIL) (2.5 MG/3ML) 0.083% nebulizer solution, Inhale the contents of 1 vial by nebulization Every 4 (Four) Hours As Needed for Wheezing., Disp: 75 mL, Rfl: 3  •  albuterol sulfate  (90 Base) MCG/ACT inhaler, Inhale 2 puffs Every 4 (Four) Hours As Needed for Wheezing., Disp: 18 g, Rfl: 1  •  aspirin (aspirin) 81 MG EC tablet, Take 1 tablet by mouth Daily., Disp: 60 tablet, Rfl: 5  •  atorvastatin (LIPITOR) 20 MG tablet, TAKE ONE TABLET BY MOUTH EVERY NIGHT AT BEDTIME, Disp: 30 tablet, Rfl: 1  •  Blood Glucose Monitoring Suppl ([employer] Blood Glucose Meter) w/Device kit, 1 each 3 (Three) Times a Day., Disp: 1 kit, Rfl: 3  •  [START ON 10/30/2021] bumetanide (BUMEX) 2 MG tablet, Take 1 tablet by mouth 4 (Four) Times a Week., Disp: 30 tablet, Rfl: 0  •  cefdinir (OMNICEF) 300 MG capsule, Take 1 capsule by mouth Every Other Day for 2 doses., Disp: 2 capsule, Rfl: 0  •  cetirizine (zyrTEC) 10 MG tablet, Take 1 tablet by mouth Daily., Disp: 30 tablet, Rfl: 3  •  clopidogrel (PLAVIX) 75 MG tablet, Take 1 tablet by mouth Daily., Disp: 30 tablet, Rfl: 5  •  Continuous Blood Gluc  (FreeStyle Jade 2 [place]) device, 1 each Continuous., Disp: 1 each, Rfl: 0  •  Continuous Blood Gluc Sensor (FreeStyle Jade 2 Sensor) misc, 1 each Every 14 (Fourteen) Days., Disp: 2 each, Rfl: 11  •  cyclobenzaprine (FLEXERIL) 10 MG tablet, Take 1 tablet by mouth 2 (Two) Times a Day As Needed for Muscle Spasms., Disp: 60 tablet, Rfl: 5  •  Dulaglutide (Trulicity) 0.75 MG/0.5ML solution pen-injector, Inject 0.75 mg (1 pen) under the skin into the appropriate area as directed 1  (One) Time Per Week., Disp: 2 mL, Rfl: 6  •  DULoxetine (CYMBALTA) 20 MG capsule, TAKE 1 CAPSULE BY MOUTH DAILY., Disp: 30 capsule, Rfl: 2  •  EASY TOUCH PEN NEEDLES 31G X 8 MM misc, , Disp: , Rfl:   •  ferrous sulfate (FeroSul) 325 (65 FE) MG tablet, Take 1 tablet by mouth Daily With Breakfast., Disp: 30 tablet, Rfl: 3  •  gabapentin (NEURONTIN) 400 MG capsule, Take 1 capsule by mouth 3 (Three) Times a Day., Disp: 90 capsule, Rfl: 0  •  glucose blood test strip, Use as instructed, Disp: 100 each, Rfl: 12  •  insulin aspart (novoLOG FLEXPEN) 100 UNIT/ML solution pen-injector sc pen, Inject 30 Units under the skin into the appropriate area as directed 3 (Three) Times a Day With Meals., Disp: 30 mL, Rfl: 12  •  insulin degludec (Tresiba FlexTouch) 100 UNIT/ML solution pen-injector injection, Inject 50 Units under the skin into the appropriate area as directed Every Night., Disp: 15 mL, Rfl: 10  •  Insulin Pen Needle (NovoFine) 30G X 8 MM misc, As directed 4 times daily, Disp: 100 each, Rfl: 11  •  Insulin Pen Needle 31G X 8 MM misc, Use to inject insulin 4 (Four) Times a Day as directed., Disp: 120 each, Rfl: 12  •  ipratropium-albuterol (DUO-NEB) 0.5-2.5 mg/3 ml nebulizer, Take 3 mL by nebulization 4 (Four) Times a Day., Disp: , Rfl:   •  isosorbide mononitrate (IMDUR) 60 MG 24 hr tablet, Take 1 tablet by mouth Every Morning. (Patient taking differently: Take 0.5 tablets by mouth Every Morning. TAKING 30 MG ), Disp: 90 tablet, Rfl: 3  •  levothyroxine (SYNTHROID, LEVOTHROID) 25 MCG tablet, Take 25 mcg by mouth Daily., Disp: , Rfl:   •  lidocaine (LIDODERM) 5 %, APPLY TO THE AFFECTED AREA(S) TOPICALLY TWO TIMES A DAY. REMOVE NIGHTLY, Disp: 30 patch, Rfl: 1  •  lisinopril (PRINIVIL,ZESTRIL) 10 MG tablet, Take 1 tablet by mouth Daily., Disp: 30 tablet, Rfl: 5  •  metoprolol tartrate (LOPRESSOR) 50 MG tablet, TAKE ONE TABLET BY MOUTH TWO TIMES A DAY. HOLD IF PULSE IS LESS THAN 60, Disp: 60 tablet, Rfl: 1  •  montelukast  (SINGULAIR) 10 MG tablet, Take 1 tablet by mouth Every Night., Disp: 30 tablet, Rfl: 5  •  nitroglycerin (NITROSTAT) 0.4 MG SL tablet, Place 0.4 mg under the tongue Every 5 (Five) Minutes As Needed for Chest Pain (x 3 doses)., Disp: , Rfl:   •  nystatin (MYCOSTATIN) 337663 UNIT/GM powder, Apply  topically to the appropriate area as directed 3 (Three) Times a Day., Disp: 15 g, Rfl: 1  •  ondansetron ODT (Zofran ODT) 4 MG disintegrating tablet, Place 1 tablet on the tongue Every 8 (Eight) Hours As Needed for Nausea or Vomiting., Disp: 30 tablet, Rfl: 0  •  oxybutynin XL (DITROPAN-XL) 5 MG 24 hr tablet, Take 1 tablet by mouth Daily., Disp: 90 tablet, Rfl: 1  •  pantoprazole (PROTONIX) 40 MG EC tablet, Take 1 tablet by mouth Every Night., Disp: 30 tablet, Rfl: 5  •  promethazine (PHENERGAN) 25 MG tablet, Take 25 mg by mouth Every 6 (Six) Hours As Needed., Disp: , Rfl:   •  sennosides-docusate (PERICOLACE) 8.6-50 MG per tablet, Take 2 tablets by mouth 2 (Two) Times a Day., Disp: 60 tablet, Rfl: 2  •  sodium bicarbonate 650 MG tablet, Take 1 tablet by mouth 2 (Two) Times a Day. (Patient taking differently: Take 2 tablets by mouth 2 (Two) Times a Day.), Disp: 60 tablet, Rfl: 1  No current facility-administered medications for this visit.    Allergies:  Allergies   Allergen Reactions   • Adhesive Tape Hives   • Other      Bandaids, MRSA, SURECLOSE       Family Hx:  Family History   Problem Relation Age of Onset   • Heart disease Mother    • Heart disease Father    • Heart disease Sister    • Heart disease Brother         Social History:  Social History     Socioeconomic History   • Marital status:      Spouse name: wesley dumont   Tobacco Use   • Smoking status: Former Smoker     Packs/day: 0.25     Years: 46.00     Pack years: 11.50     Types: Cigarettes   • Smokeless tobacco: Never Used   • Tobacco comment: only smoking 5 a day    Substance and Sexual Activity   • Alcohol use: No   • Drug use: Not Currently      Types: LSD, Marijuana, Methamphetamines   • Sexual activity: Not Currently       Review of Systems  Review of Systems   Respiratory: Negative for shortness of breath (improved from hospitalization ).    Cardiovascular: Negative for leg swelling.   Musculoskeletal:        No leg pain       Objective:     Blood pressure 118/68  Temperature 97.1  Heart rate 70  Respiratory rate 18  SPO2 95% on 3 L nasal cannula    Physical Exam  Constitutional:       General: She is not in acute distress.     Appearance: She is obese. She is not toxic-appearing or diaphoretic.      Comments: Patient on 3 L nasal cannula.   HENT:      Head: Normocephalic and atraumatic.   Cardiovascular:      Rate and Rhythm: Normal rate and regular rhythm.   Pulmonary:      Effort: Pulmonary effort is normal. No respiratory distress.      Breath sounds: Normal breath sounds. No wheezing.   Abdominal:      Tenderness: There is no abdominal tenderness.   Musculoskeletal:      Right lower leg: No edema.      Left lower leg: No edema.   Neurological:      Mental Status: She is alert.          Assessment/Plan:     Yamileth Slater is a 61 y.o. female with a concurrent medical history of hypertension, coronary artery disease, hyperlipidemia, diabetes mellitus, CKD stage IV, COPD, and chronic respiratory failure on 3 L oxygen at home who presents for nursing home visit.    Diagnoses and all orders for this visit:    1. Nursing home resident (Primary)    The patient was admitted to the hospital recently for a GI bleed and was diagnosed with small nonbleeding intestinal AVMs with a plan by GI to follow-up outpatient after patient given transfusion in the hospital.    The patient also had elevated creatinine during her hospitalization and nephrology has started hemodialysis.  The patient says she is now receiving this and says that sometimes she has issues with her blood pressure during treatments but otherwise has no concerns.    She was recently discharged  from the hospital to [facility].     Today, the patient's vital signs were stable.  She had no acute complaints.  The nursing staff did not have any concerns.    · Rx changes: None  · Patient Education: None       Follow-up:     Return in about 1 month (around 11/29/2021) for Nursing home recheck.    Preventative:  Health Maintenance   Topic Date Due   • COVID-19 Vaccine (1) Never done   • ZOSTER VACCINE (1 of 2) Never done   • ANNUAL WELLNESS VISIT  Never done   • DIABETIC EYE EXAM  10/09/2019   • DIABETIC FOOT EXAM  12/26/2019   • PAP SMEAR  01/04/2020   • Pneumococcal Vaccine 0-64 (3 of 4 - PPSV23) 02/04/2020   • INFLUENZA VACCINE  08/01/2021   • MAMMOGRAM  01/10/2022   • HEMOGLOBIN A1C  04/26/2022   • LIPID PANEL  06/02/2022   • URINE MICROALBUMIN  10/19/2022   • TDAP/TD VACCINES (3 - Td or Tdap) 11/10/2024   • COLORECTAL CANCER SCREENING  05/14/2026   • HEPATITIS C SCREENING  Completed   • Hepatitis B  Aged Out     Alcohol use:  reports no history of alcohol use.  Nicotine status  reports that she has quit smoking. Her smoking use included cigarettes. She has a 11.50 pack-year smoking history. She has never used smokeless tobacco.    Goals     • Fasting Blood Glucose       Barriers: compliance with diet      • Quit smoking / using tobacco           RISK SCORE: 5      This document has been electronically signed by Alvarado Mauricio MD on October 29, 2021 14:45 CDT

## 2021-10-29 NOTE — PROGRESS NOTES
Msg left for Mackenzie,  at [facility] re: transportation benefit through [employer], managed by [employer] [phone].  Asked Mackenzie to assist with setting up pt's transportation to outpt HD when she is d/c'd from SNF.   Hoping pt will have taken necessary steps to have car registrations canceled (both cars no longer in working condition) so that she can eventually utilize PACs, but [employer] option may be necessary in the meantime.                  This document has been electronically signed by Katy Kim LCSW on October 29, 2021 10:46 CDT

## 2021-11-04 ENCOUNTER — TELEPHONE (OUTPATIENT)
Dept: FAMILY MEDICINE CLINIC | Facility: CLINIC | Age: 61
End: 2021-11-04

## 2021-11-04 NOTE — TELEPHONE ENCOUNTER
PATIENT IS REQUESTING SOME TYLENOL TO BE SENT OVER TO [facility].    CALL BACK NUMBER FOR THEM [phone].    THANKS,TASHI

## 2021-11-05 RX ORDER — SENNOSIDES 8.6 MG
650 CAPSULE ORAL EVERY 8 HOURS PRN
Qty: 90 TABLET | Refills: 0 | Status: SHIPPED | OUTPATIENT
Start: 2021-11-05 | End: 2021-11-22 | Stop reason: SDUPTHER

## 2021-11-08 ENCOUNTER — APPOINTMENT (OUTPATIENT)
Dept: GENERAL RADIOLOGY | Facility: HOSPITAL | Age: 61
End: 2021-11-08

## 2021-11-08 ENCOUNTER — HOSPITAL ENCOUNTER (INPATIENT)
Facility: HOSPITAL | Age: 61
LOS: 3 days | Discharge: HOME HEALTH | End: 2021-11-15
Attending: EMERGENCY MEDICINE | Admitting: HOSPITALIST

## 2021-11-08 DIAGNOSIS — I25.10 ATHEROSCLEROSIS OF NATIVE CORONARY ARTERY OF NATIVE HEART WITHOUT ANGINA PECTORIS: ICD-10-CM

## 2021-11-08 DIAGNOSIS — J96.12 CHRONIC HYPERCAPNIC RESPIRATORY FAILURE (HCC): ICD-10-CM

## 2021-11-08 DIAGNOSIS — E66.01 MORBID OBESITY (HCC): Chronic | ICD-10-CM

## 2021-11-08 DIAGNOSIS — Z78.9 IMPAIRED MOBILITY AND ADLS: ICD-10-CM

## 2021-11-08 DIAGNOSIS — I50.32 CHRONIC DIASTOLIC CONGESTIVE HEART FAILURE (HCC): Chronic | ICD-10-CM

## 2021-11-08 DIAGNOSIS — R73.9 HYPERGLYCEMIA: ICD-10-CM

## 2021-11-08 DIAGNOSIS — Z74.09 IMPAIRED MOBILITY AND ADLS: ICD-10-CM

## 2021-11-08 DIAGNOSIS — Z74.09 IMPAIRED FUNCTIONAL MOBILITY, BALANCE, GAIT, AND ENDURANCE: ICD-10-CM

## 2021-11-08 DIAGNOSIS — Z98.61 HISTORY OF PTCA: ICD-10-CM

## 2021-11-08 DIAGNOSIS — R07.9 CHEST PAIN, UNSPECIFIED TYPE: Primary | ICD-10-CM

## 2021-11-08 PROBLEM — D64.9 ANEMIA: Status: ACTIVE | Noted: 2021-11-08

## 2021-11-08 PROBLEM — J98.4 PNEUMONITIS: Status: ACTIVE | Noted: 2021-11-08

## 2021-11-08 PROBLEM — J18.9 PNEUMONITIS: Status: ACTIVE | Noted: 2021-11-08

## 2021-11-08 PROBLEM — N18.5 CKD (CHRONIC KIDNEY DISEASE), SYMPTOM MANAGEMENT ONLY, STAGE 5: Status: ACTIVE | Noted: 2020-10-05

## 2021-11-08 PROBLEM — E11.65 TYPE 2 DIABETES MELLITUS WITH HYPERGLYCEMIA: Status: ACTIVE | Noted: 2021-11-08

## 2021-11-08 LAB
ALBUMIN SERPL-MCNC: 3.3 G/DL (ref 3.5–5.2)
ALBUMIN/GLOB SERPL: 0.9 G/DL
ALP SERPL-CCNC: 134 U/L (ref 39–117)
ALT SERPL W P-5'-P-CCNC: 8 U/L (ref 1–33)
ANION GAP SERPL CALCULATED.3IONS-SCNC: 13 MMOL/L (ref 5–15)
AST SERPL-CCNC: 11 U/L (ref 1–32)
BASOPHILS # BLD AUTO: 0.06 10*3/MM3 (ref 0–0.2)
BASOPHILS NFR BLD AUTO: 0.7 % (ref 0–1.5)
BILIRUB SERPL-MCNC: 0.2 MG/DL (ref 0–1.2)
BUN SERPL-MCNC: 48 MG/DL (ref 8–23)
BUN/CREAT SERPL: 14.7 (ref 7–25)
CALCIUM SPEC-SCNC: 8.7 MG/DL (ref 8.6–10.5)
CHLORIDE SERPL-SCNC: 93 MMOL/L (ref 98–107)
CO2 SERPL-SCNC: 25 MMOL/L (ref 22–29)
CRE SCREEN PCR: NOT DETECTED
CREAT SERPL-MCNC: 3.27 MG/DL (ref 0.57–1)
DEPRECATED RDW RBC AUTO: 45.1 FL (ref 37–54)
EOSINOPHIL # BLD AUTO: 0.29 10*3/MM3 (ref 0–0.4)
EOSINOPHIL NFR BLD AUTO: 3.6 % (ref 0.3–6.2)
ERYTHROCYTE [DISTWIDTH] IN BLOOD BY AUTOMATED COUNT: 14.8 % (ref 12.3–15.4)
FLUAV RNA RESP QL NAA+PROBE: NOT DETECTED
FLUBV RNA RESP QL NAA+PROBE: NOT DETECTED
GFR SERPL CREATININE-BSD FRML MDRD: 14 ML/MIN/1.73
GFR SERPL CREATININE-BSD FRML MDRD: ABNORMAL ML/MIN/{1.73_M2}
GLOBULIN UR ELPH-MCNC: 3.8 GM/DL
GLUCOSE BLDC GLUCOMTR-MCNC: 392 MG/DL (ref 70–130)
GLUCOSE SERPL-MCNC: 441 MG/DL (ref 65–99)
HCT VFR BLD AUTO: 24.7 % (ref 34–46.6)
HGB BLD-MCNC: 7.9 G/DL (ref 12–15.9)
HOLD SPECIMEN: NORMAL
HOLD SPECIMEN: NORMAL
IMM GRANULOCYTES # BLD AUTO: 0.07 10*3/MM3 (ref 0–0.05)
IMM GRANULOCYTES NFR BLD AUTO: 0.9 % (ref 0–0.5)
IMP STRAIN: NOT DETECTED
KPC STRAIN: NOT DETECTED
LYMPHOCYTES # BLD AUTO: 2.14 10*3/MM3 (ref 0.7–3.1)
LYMPHOCYTES NFR BLD AUTO: 26.3 % (ref 19.6–45.3)
MCH RBC QN AUTO: 27.2 PG (ref 26.6–33)
MCHC RBC AUTO-ENTMCNC: 32 G/DL (ref 31.5–35.7)
MCV RBC AUTO: 85.2 FL (ref 79–97)
MONOCYTES # BLD AUTO: 0.56 10*3/MM3 (ref 0.1–0.9)
MONOCYTES NFR BLD AUTO: 6.9 % (ref 5–12)
NDM STRAIN: NOT DETECTED
NEUTROPHILS NFR BLD AUTO: 5.02 10*3/MM3 (ref 1.7–7)
NEUTROPHILS NFR BLD AUTO: 61.6 % (ref 42.7–76)
NRBC BLD AUTO-RTO: 0 /100 WBC (ref 0–0.2)
NT-PROBNP SERPL-MCNC: 1386 PG/ML (ref 0–900)
OXA 48 STRAIN: NOT DETECTED
PLATELET # BLD AUTO: 252 10*3/MM3 (ref 140–450)
PMV BLD AUTO: 8.7 FL (ref 6–12)
POTASSIUM SERPL-SCNC: 4.1 MMOL/L (ref 3.5–5.2)
PROT SERPL-MCNC: 7.1 G/DL (ref 6–8.5)
RBC # BLD AUTO: 2.9 10*6/MM3 (ref 3.77–5.28)
SARS-COV-2 RNA RESP QL NAA+PROBE: NOT DETECTED
SODIUM SERPL-SCNC: 131 MMOL/L (ref 136–145)
TROPONIN T SERPL-MCNC: 0.02 NG/ML (ref 0–0.03)
TROPONIN T SERPL-MCNC: 0.02 NG/ML (ref 0–0.03)
TROPONIN T SERPL-MCNC: 0.03 NG/ML (ref 0–0.03)
TROPONIN T SERPL-MCNC: 0.03 NG/ML (ref 0–0.03)
VIM STRAIN: NOT DETECTED
WBC # BLD AUTO: 8.14 10*3/MM3 (ref 3.4–10.8)
WHOLE BLOOD HOLD SPECIMEN: NORMAL
WHOLE BLOOD HOLD SPECIMEN: NORMAL

## 2021-11-08 PROCEDURE — 25010000002 HEPARIN (PORCINE) PER 1000 UNITS: Performed by: STUDENT IN AN ORGANIZED HEALTH CARE EDUCATION/TRAINING PROGRAM

## 2021-11-08 PROCEDURE — 82962 GLUCOSE BLOOD TEST: CPT

## 2021-11-08 PROCEDURE — 63710000001 INSULIN DETEMIR PER 5 UNITS: Performed by: STUDENT IN AN ORGANIZED HEALTH CARE EDUCATION/TRAINING PROGRAM

## 2021-11-08 PROCEDURE — G0378 HOSPITAL OBSERVATION PER HR: HCPCS

## 2021-11-08 PROCEDURE — 71045 X-RAY EXAM CHEST 1 VIEW: CPT

## 2021-11-08 PROCEDURE — 84484 ASSAY OF TROPONIN QUANT: CPT | Performed by: STUDENT IN AN ORGANIZED HEALTH CARE EDUCATION/TRAINING PROGRAM

## 2021-11-08 PROCEDURE — 94760 N-INVAS EAR/PLS OXIMETRY 1: CPT

## 2021-11-08 PROCEDURE — 36415 COLL VENOUS BLD VENIPUNCTURE: CPT | Performed by: STUDENT IN AN ORGANIZED HEALTH CARE EDUCATION/TRAINING PROGRAM

## 2021-11-08 PROCEDURE — 80053 COMPREHEN METABOLIC PANEL: CPT | Performed by: EMERGENCY MEDICINE

## 2021-11-08 PROCEDURE — 63710000001 INSULIN ASPART PER 5 UNITS: Performed by: STUDENT IN AN ORGANIZED HEALTH CARE EDUCATION/TRAINING PROGRAM

## 2021-11-08 PROCEDURE — 99285 EMERGENCY DEPT VISIT HI MDM: CPT

## 2021-11-08 PROCEDURE — 83880 ASSAY OF NATRIURETIC PEPTIDE: CPT | Performed by: EMERGENCY MEDICINE

## 2021-11-08 PROCEDURE — 87081 CULTURE SCREEN ONLY: CPT | Performed by: FAMILY MEDICINE

## 2021-11-08 PROCEDURE — 94640 AIRWAY INHALATION TREATMENT: CPT

## 2021-11-08 PROCEDURE — 93010 ELECTROCARDIOGRAM REPORT: CPT | Performed by: INTERNAL MEDICINE

## 2021-11-08 PROCEDURE — 63710000001 INSULIN REGULAR HUMAN PER 5 UNITS: Performed by: EMERGENCY MEDICINE

## 2021-11-08 PROCEDURE — 94799 UNLISTED PULMONARY SVC/PX: CPT

## 2021-11-08 PROCEDURE — 84484 ASSAY OF TROPONIN QUANT: CPT | Performed by: EMERGENCY MEDICINE

## 2021-11-08 PROCEDURE — 87636 SARSCOV2 & INF A&B AMP PRB: CPT | Performed by: EMERGENCY MEDICINE

## 2021-11-08 PROCEDURE — 85025 COMPLETE CBC W/AUTO DIFF WBC: CPT | Performed by: EMERGENCY MEDICINE

## 2021-11-08 PROCEDURE — 93005 ELECTROCARDIOGRAM TRACING: CPT | Performed by: EMERGENCY MEDICINE

## 2021-11-08 RX ORDER — ACETAMINOPHEN 325 MG/1
650 TABLET ORAL EVERY 8 HOURS PRN
Status: DISCONTINUED | OUTPATIENT
Start: 2021-11-08 | End: 2021-11-15 | Stop reason: HOSPADM

## 2021-11-08 RX ORDER — MORPHINE SULFATE 2 MG/ML
1 INJECTION, SOLUTION INTRAMUSCULAR; INTRAVENOUS EVERY 4 HOURS PRN
Status: DISCONTINUED | OUTPATIENT
Start: 2021-11-08 | End: 2021-11-14

## 2021-11-08 RX ORDER — ALBUTEROL SULFATE 2.5 MG/3ML
2.5 SOLUTION RESPIRATORY (INHALATION) EVERY 4 HOURS PRN
Status: DISCONTINUED | OUTPATIENT
Start: 2021-11-08 | End: 2021-11-15 | Stop reason: HOSPADM

## 2021-11-08 RX ORDER — ACETAMINOPHEN 325 MG/1
650 TABLET ORAL EVERY 4 HOURS PRN
Status: DISCONTINUED | OUTPATIENT
Start: 2021-11-08 | End: 2021-11-08 | Stop reason: SDUPTHER

## 2021-11-08 RX ORDER — SODIUM BICARBONATE 650 MG/1
650 TABLET ORAL 2 TIMES DAILY
Status: DISCONTINUED | OUTPATIENT
Start: 2021-11-08 | End: 2021-11-09

## 2021-11-08 RX ORDER — PROMETHAZINE HYDROCHLORIDE 25 MG/1
25 TABLET ORAL EVERY 6 HOURS PRN
Status: DISCONTINUED | OUTPATIENT
Start: 2021-11-08 | End: 2021-11-15 | Stop reason: HOSPADM

## 2021-11-08 RX ORDER — FLUTICASONE PROPIONATE 50 MCG
2 SPRAY, SUSPENSION (ML) NASAL DAILY
Status: DISCONTINUED | OUTPATIENT
Start: 2021-11-08 | End: 2021-11-15 | Stop reason: HOSPADM

## 2021-11-08 RX ORDER — METOPROLOL TARTRATE 50 MG/1
50 TABLET, FILM COATED ORAL EVERY 12 HOURS SCHEDULED
Status: DISCONTINUED | OUTPATIENT
Start: 2021-11-08 | End: 2021-11-15 | Stop reason: HOSPADM

## 2021-11-08 RX ORDER — ALBUTEROL SULFATE 90 UG/1
2 AEROSOL, METERED RESPIRATORY (INHALATION) EVERY 4 HOURS PRN
Status: DISCONTINUED | OUTPATIENT
Start: 2021-11-08 | End: 2021-11-08 | Stop reason: SDUPTHER

## 2021-11-08 RX ORDER — LISINOPRIL 10 MG/1
10 TABLET ORAL DAILY
Status: DISCONTINUED | OUTPATIENT
Start: 2021-11-08 | End: 2021-11-14

## 2021-11-08 RX ORDER — CLOPIDOGREL BISULFATE 75 MG/1
75 TABLET ORAL DAILY
Status: DISCONTINUED | OUTPATIENT
Start: 2021-11-08 | End: 2021-11-15 | Stop reason: HOSPADM

## 2021-11-08 RX ORDER — NICOTINE POLACRILEX 4 MG
15 LOZENGE BUCCAL
Status: DISCONTINUED | OUTPATIENT
Start: 2021-11-08 | End: 2021-11-15 | Stop reason: HOSPADM

## 2021-11-08 RX ORDER — MONTELUKAST SODIUM 10 MG/1
10 TABLET ORAL NIGHTLY
Status: DISCONTINUED | OUTPATIENT
Start: 2021-11-08 | End: 2021-11-15 | Stop reason: HOSPADM

## 2021-11-08 RX ORDER — BUMETANIDE 1 MG/1
2 TABLET ORAL
Status: DISCONTINUED | OUTPATIENT
Start: 2021-11-09 | End: 2021-11-11

## 2021-11-08 RX ORDER — ASPIRIN 81 MG/1
324 TABLET, CHEWABLE ORAL ONCE
Status: COMPLETED | OUTPATIENT
Start: 2021-11-08 | End: 2021-11-08

## 2021-11-08 RX ORDER — PANTOPRAZOLE SODIUM 40 MG/1
40 TABLET, DELAYED RELEASE ORAL NIGHTLY
Status: DISCONTINUED | OUTPATIENT
Start: 2021-11-08 | End: 2021-11-15 | Stop reason: HOSPADM

## 2021-11-08 RX ORDER — HEPARIN SODIUM 5000 [USP'U]/ML
5000 INJECTION, SOLUTION INTRAVENOUS; SUBCUTANEOUS EVERY 12 HOURS SCHEDULED
Status: DISCONTINUED | OUTPATIENT
Start: 2021-11-08 | End: 2021-11-15 | Stop reason: HOSPADM

## 2021-11-08 RX ORDER — ISOSORBIDE MONONITRATE 30 MG/1
30 TABLET, EXTENDED RELEASE ORAL EVERY MORNING
Status: DISCONTINUED | OUTPATIENT
Start: 2021-11-09 | End: 2021-11-15 | Stop reason: HOSPADM

## 2021-11-08 RX ORDER — OXYBUTYNIN CHLORIDE 5 MG/1
5 TABLET, EXTENDED RELEASE ORAL DAILY
Status: DISCONTINUED | OUTPATIENT
Start: 2021-11-08 | End: 2021-11-15 | Stop reason: HOSPADM

## 2021-11-08 RX ORDER — FERROUS SULFATE TAB EC 324 MG (65 MG FE EQUIVALENT) 324 (65 FE) MG
324 TABLET DELAYED RESPONSE ORAL
Status: DISCONTINUED | OUTPATIENT
Start: 2021-11-09 | End: 2021-11-15 | Stop reason: HOSPADM

## 2021-11-08 RX ORDER — NALOXONE HCL 0.4 MG/ML
0.4 VIAL (ML) INJECTION
Status: DISCONTINUED | OUTPATIENT
Start: 2021-11-08 | End: 2021-11-14

## 2021-11-08 RX ORDER — SODIUM CHLORIDE 0.9 % (FLUSH) 0.9 %
10 SYRINGE (ML) INJECTION EVERY 12 HOURS SCHEDULED
Status: DISCONTINUED | OUTPATIENT
Start: 2021-11-08 | End: 2021-11-15 | Stop reason: HOSPADM

## 2021-11-08 RX ORDER — NYSTATIN 100000 [USP'U]/G
POWDER TOPICAL 3 TIMES DAILY
Status: DISCONTINUED | OUTPATIENT
Start: 2021-11-08 | End: 2021-11-15 | Stop reason: HOSPADM

## 2021-11-08 RX ORDER — LIDOCAINE 50 MG/G
1 PATCH TOPICAL
Status: DISCONTINUED | OUTPATIENT
Start: 2021-11-09 | End: 2021-11-15 | Stop reason: HOSPADM

## 2021-11-08 RX ORDER — CYCLOBENZAPRINE HCL 10 MG
10 TABLET ORAL 2 TIMES DAILY PRN
Status: DISCONTINUED | OUTPATIENT
Start: 2021-11-08 | End: 2021-11-15 | Stop reason: HOSPADM

## 2021-11-08 RX ORDER — GABAPENTIN 400 MG/1
400 CAPSULE ORAL 3 TIMES DAILY
Status: DISCONTINUED | OUTPATIENT
Start: 2021-11-08 | End: 2021-11-15 | Stop reason: HOSPADM

## 2021-11-08 RX ORDER — DEXTROSE MONOHYDRATE 25 G/50ML
25 INJECTION, SOLUTION INTRAVENOUS
Status: DISCONTINUED | OUTPATIENT
Start: 2021-11-08 | End: 2021-11-15 | Stop reason: HOSPADM

## 2021-11-08 RX ORDER — SODIUM CHLORIDE 0.9 % (FLUSH) 0.9 %
10 SYRINGE (ML) INJECTION AS NEEDED
Status: DISCONTINUED | OUTPATIENT
Start: 2021-11-08 | End: 2021-11-15 | Stop reason: HOSPADM

## 2021-11-08 RX ORDER — LEVOTHYROXINE SODIUM 0.03 MG/1
25 TABLET ORAL DAILY
Status: DISCONTINUED | OUTPATIENT
Start: 2021-11-09 | End: 2021-11-15 | Stop reason: HOSPADM

## 2021-11-08 RX ORDER — NITROGLYCERIN 0.4 MG/1
0.4 TABLET SUBLINGUAL
Status: DISCONTINUED | OUTPATIENT
Start: 2021-11-08 | End: 2021-11-15 | Stop reason: HOSPADM

## 2021-11-08 RX ORDER — ASPIRIN 81 MG/1
81 TABLET ORAL DAILY
Status: DISCONTINUED | OUTPATIENT
Start: 2021-11-08 | End: 2021-11-15 | Stop reason: HOSPADM

## 2021-11-08 RX ORDER — ONDANSETRON 4 MG/1
4 TABLET, ORALLY DISINTEGRATING ORAL EVERY 8 HOURS PRN
Status: DISCONTINUED | OUTPATIENT
Start: 2021-11-08 | End: 2021-11-15 | Stop reason: HOSPADM

## 2021-11-08 RX ORDER — IPRATROPIUM BROMIDE AND ALBUTEROL SULFATE 2.5; .5 MG/3ML; MG/3ML
3 SOLUTION RESPIRATORY (INHALATION)
Status: DISCONTINUED | OUTPATIENT
Start: 2021-11-08 | End: 2021-11-15 | Stop reason: HOSPADM

## 2021-11-08 RX ORDER — CETIRIZINE HYDROCHLORIDE 10 MG/1
10 TABLET ORAL DAILY
Status: DISCONTINUED | OUTPATIENT
Start: 2021-11-08 | End: 2021-11-15 | Stop reason: HOSPADM

## 2021-11-08 RX ORDER — AMOXICILLIN 250 MG
2 CAPSULE ORAL 2 TIMES DAILY
Status: DISCONTINUED | OUTPATIENT
Start: 2021-11-08 | End: 2021-11-15 | Stop reason: HOSPADM

## 2021-11-08 RX ORDER — ONDANSETRON 2 MG/ML
4 INJECTION INTRAMUSCULAR; INTRAVENOUS EVERY 6 HOURS PRN
Status: DISCONTINUED | OUTPATIENT
Start: 2021-11-08 | End: 2021-11-15 | Stop reason: HOSPADM

## 2021-11-08 RX ORDER — DULOXETIN HYDROCHLORIDE 20 MG/1
20 CAPSULE, DELAYED RELEASE ORAL DAILY
Status: DISCONTINUED | OUTPATIENT
Start: 2021-11-08 | End: 2021-11-15 | Stop reason: HOSPADM

## 2021-11-08 RX ORDER — LANOLIN ALCOHOL/MO/W.PET/CERES
1.5 CREAM (GRAM) TOPICAL NIGHTLY PRN
Status: DISCONTINUED | OUTPATIENT
Start: 2021-11-08 | End: 2021-11-15 | Stop reason: HOSPADM

## 2021-11-08 RX ORDER — ATORVASTATIN CALCIUM 20 MG/1
20 TABLET, FILM COATED ORAL DAILY
Status: DISCONTINUED | OUTPATIENT
Start: 2021-11-08 | End: 2021-11-15 | Stop reason: HOSPADM

## 2021-11-08 RX ADMIN — INSULIN ASPART 6 UNITS: 100 INJECTION, SOLUTION INTRAVENOUS; SUBCUTANEOUS at 18:39

## 2021-11-08 RX ADMIN — NITROGLYCERIN 1 INCH: 20 OINTMENT TOPICAL at 14:54

## 2021-11-08 RX ADMIN — DULOXETINE HYDROCHLORIDE 20 MG: 20 CAPSULE, DELAYED RELEASE ORAL at 18:39

## 2021-11-08 RX ADMIN — OXYBUTYNIN CHLORIDE 5 MG: 5 TABLET, EXTENDED RELEASE ORAL at 18:39

## 2021-11-08 RX ADMIN — MONTELUKAST 10 MG: 10 TABLET, FILM COATED ORAL at 20:53

## 2021-11-08 RX ADMIN — PANTOPRAZOLE SODIUM 40 MG: 40 TABLET, DELAYED RELEASE ORAL at 20:53

## 2021-11-08 RX ADMIN — IPRATROPIUM BROMIDE AND ALBUTEROL SULFATE 3 ML: 2.5; .5 SOLUTION RESPIRATORY (INHALATION) at 19:54

## 2021-11-08 RX ADMIN — ASPIRIN 81 MG: 81 TABLET, FILM COATED ORAL at 18:39

## 2021-11-08 RX ADMIN — ATORVASTATIN CALCIUM 20 MG: 20 TABLET, FILM COATED ORAL at 18:43

## 2021-11-08 RX ADMIN — INSULIN DETEMIR 50 UNITS: 100 INJECTION, SOLUTION SUBCUTANEOUS at 21:18

## 2021-11-08 RX ADMIN — CLOPIDOGREL BISULFATE 75 MG: 75 TABLET ORAL at 18:39

## 2021-11-08 RX ADMIN — CETIRIZINE HYDROCHLORIDE 10 MG: 10 TABLET, FILM COATED ORAL at 18:39

## 2021-11-08 RX ADMIN — SODIUM BICARBONATE 650 MG: 650 TABLET ORAL at 20:53

## 2021-11-08 RX ADMIN — SODIUM CHLORIDE, PRESERVATIVE FREE 10 ML: 5 INJECTION INTRAVENOUS at 20:53

## 2021-11-08 RX ADMIN — HUMAN INSULIN 10 UNITS: 100 INJECTION, SOLUTION SUBCUTANEOUS at 15:39

## 2021-11-08 RX ADMIN — HEPARIN SODIUM 5000 UNITS: 5000 INJECTION INTRAVENOUS; SUBCUTANEOUS at 20:53

## 2021-11-08 RX ADMIN — GABAPENTIN 400 MG: 400 CAPSULE ORAL at 20:52

## 2021-11-08 RX ADMIN — ASPIRIN 324 MG: 81 TABLET, CHEWABLE ORAL at 14:54

## 2021-11-08 RX ADMIN — METOPROLOL TARTRATE 50 MG: 50 TABLET, FILM COATED ORAL at 20:53

## 2021-11-08 RX ADMIN — LISINOPRIL 10 MG: 10 TABLET ORAL at 18:39

## 2021-11-08 RX ADMIN — INSULIN ASPART 30 UNITS: 100 INJECTION, SOLUTION INTRAVENOUS; SUBCUTANEOUS at 18:40

## 2021-11-08 NOTE — ED PROVIDER NOTES
Subjective   Patient presents emergency department complaint of chest pain.  Patient notes a 1 day history of the same starting last evening.  Patient states that this is nonexertional.  Patient does not do much in terms of exertion.  Patient is a diabetic patient with known coronary disease status post bypass and stents placement.  Patient with morbid obesity.  Prior MI.  Patient recently started on dialysis for chronic kidney disease.  Patient is on Tuesday Thursday Friday with Dr. Lauren.  Patient is in between cardiologist that time, her former cardiologist was Dr. Barragan.  Patient notes that the chest pain started radiating to the left arm today which was of concern to her.  Patient presents for further evaluation.  Patient notes no associated diaphoresis or shortness of breath.  Patient continues to make some urine, urinating approximately 5 times a day.          Review of Systems   Constitutional: Negative.  Negative for appetite change, chills and fever.   HENT: Negative.  Negative for congestion.    Eyes: Negative.  Negative for photophobia and visual disturbance.   Respiratory: Positive for chest tightness. Negative for cough and shortness of breath.    Cardiovascular: Positive for chest pain. Negative for palpitations.   Gastrointestinal: Negative.  Negative for abdominal pain, constipation, diarrhea, nausea and vomiting.   Endocrine: Negative.    Genitourinary: Negative.  Negative for decreased urine volume, dysuria, flank pain and hematuria.   Musculoskeletal: Negative.  Negative for arthralgias, back pain, myalgias, neck pain and neck stiffness.   Skin: Negative.  Negative for pallor.   Neurological: Negative.  Negative for dizziness, syncope, weakness, light-headedness, numbness and headaches.   Psychiatric/Behavioral: Negative.  Negative for confusion and suicidal ideas. The patient is not nervous/anxious.    All other systems reviewed and are negative.      Past Medical History:   Diagnosis Date    • Acute blood loss anemia 4/16/2017    Likely due to gastric oozing at this time. - Dr. Duarte (GI) was consulted and has now signed off, will follow up outpatient - pill colonoscopy showed AVMs - continue to monitor   • Anxiety    • Carotid artery stenosis    • Chronic obstructive lung disease (HCC)    • CKD (chronic kidney disease) stage 4, GFR 15-29 ml/min ([facility])    • Colonic polyp    • Coronary arteriosclerosis    • Diabetes mellitus ([facility])    • Diabetic neuropathy ([facility])    • Ear pain, right 10/18/2021    - canal trauma due to patient scratching and DMT2 - added cortisporin ear drops   • Elevated troponin 10/12/2021    -most likely from CKD -Trending down -Neg chest pain   • GERD (gastroesophageal reflux disease)    • GI bleed 5/13/2021    - GI will follow up outpatient - Protonix 40mg daily - Avoid medical DVT prophy and use mechanical at this time instead. - Continue to monitor - pill colonoscopy results showed AVMs   • History of transfusion    • Hypercholesterolemia    • Hypertension    • Hypomagnesemia 6/27/2021    Monitor and replace   • Morbid obesity ([facility])    • Nephrolithiasis    • Peripheral vascular disease ([facility])    • Sleep apnea    • Substance abuse ([facility])    • Vitamin D deficiency        Allergies   Allergen Reactions   • Adhesive Tape Hives   • Other      Bandaids, MRSA, SURECLOSE       Past Surgical History:   Procedure Laterality Date   • CARDIAC CATHETERIZATION N/A 7/14/2020   • CARDIAC CATHETERIZATION N/A 4/23/2021    Procedure: Left Heart Cath;  Surgeon: Melba Romo MD;  Location: [facility] CATH INVASIVE LOCATION;  Service: Cardiology;  Laterality: N/A;   • CARDIAC CATHETERIZATION N/A 4/30/2021    Procedure: Percutaneous Coronary Intervention;  Surgeon: Russell Voss MD;  Location: [facility] CATH INVASIVE LOCATION;  Service: Cardiovascular;  Laterality: N/A;   • CARDIAC CATHETERIZATION N/A 4/30/2021    Procedure: Stent NIKKI coronary;  Surgeon: Russell Voss MD;  Location:   [facility] CATH INVASIVE LOCATION;  Service: Cardiovascular;  Laterality: N/A;   • CAROTID STENT Left    • COLONOSCOPY     • COLONOSCOPY N/A 5/14/2021    Procedure: COLONOSCOPY;  Surgeon: Mingo Duarte MD;  Location: [facility] ENDOSCOPY;  Service: Gastroenterology;  Laterality: N/A;   • CORONARY ARTERY BYPASS GRAFT N/A 2013    CABG X 3   • CYSTOSCOPY BLADDER STONE LITHOTRIPSY Bilateral    • ENDOSCOPY N/A 4/12/2021    Procedure: ESOPHAGOGASTRODUODENOSCOPY;  Surgeon: Mingo Duarte MD;  Location: [facility] ENDOSCOPY;  Service: Gastroenterology;  Laterality: N/A;   • ENDOSCOPY N/A 5/14/2021    Procedure: ESOPHAGOGASTRODUODENOSCOPY;  Surgeon: Mingo Duarte MD;  Location: [facility] ENDOSCOPY;  Service: Gastroenterology;  Laterality: N/A;   • INTERVENTIONAL RADIOLOGY PROCEDURE N/A 10/21/2021    Procedure: tunneled central venous catheter placement;  Surgeon: Donnie Robles MD;  Location: [facility] ANGIO INVASIVE LOCATION;  Service: Interventional Radiology;  Laterality: N/A;       Family History   Problem Relation Age of Onset   • Heart disease Mother    • Heart disease Father    • Heart disease Sister    • Heart disease Brother        Social History     Socioeconomic History   • Marital status:      Spouse name: wesley dumont   Tobacco Use   • Smoking status: Former Smoker     Packs/day: 0.25     Years: 46.00     Pack years: 11.50     Types: Cigarettes   • Smokeless tobacco: Never Used   • Tobacco comment: only smoking 5 a day - quit april 23 2021   Substance and Sexual Activity   • Alcohol use: No   • Drug use: Not Currently     Types: LSD, Marijuana, Methamphetamines   • Sexual activity: Not Currently           Objective   Physical Exam  Vitals and nursing note reviewed.   Constitutional:       General: She is not in acute distress.     Appearance: She is well-developed. She is obese. She is not diaphoretic.      Comments: Deconditioned appearing female in no acute distress at this time.   HENT:      Head:  Normocephalic and atraumatic.      Nose: Nose normal.   Eyes:      General: No scleral icterus.     Extraocular Movements: Extraocular movements intact.      Conjunctiva/sclera: Conjunctivae normal.   Neck:      Vascular: No JVD.   Cardiovascular:      Rate and Rhythm: Normal rate and regular rhythm.      Heart sounds: Normal heart sounds. No murmur heard.  No friction rub. No gallop.    Pulmonary:      Effort: Pulmonary effort is normal. No respiratory distress.      Breath sounds: No wheezing or rales.   Chest:      Chest wall: No tenderness.   Abdominal:      General: There is no distension.      Palpations: Abdomen is soft. There is no mass.      Tenderness: There is no abdominal tenderness. There is no guarding or rebound.   Musculoskeletal:         General: No tenderness or deformity. Normal range of motion.      Cervical back: Normal range of motion and neck supple.      Right lower leg: Edema present.      Left lower leg: Edema present.      Comments: 1+ pitting edema bilateral lower extremities left greater than right.  Left the area of the patient's prior CABG harvesting.   Lymphadenopathy:      Cervical: No cervical adenopathy.   Skin:     General: Skin is warm and dry.      Capillary Refill: Capillary refill takes less than 2 seconds.      Coloration: Skin is not pale.      Findings: No erythema or rash.   Neurological:      General: No focal deficit present.      Mental Status: She is alert and oriented to person, place, and time.      Cranial Nerves: No cranial nerve deficit.      Motor: No abnormal muscle tone.   Psychiatric:         Behavior: Behavior normal.         Thought Content: Thought content normal.         Judgment: Judgment normal.         Procedures           ED Course  ED Course as of 11/08/21 1541   Mon Nov 08, 2021   1446 Initial EKG with right bundle branch block and prior infarct pattern with Q waves in lead III.  No ST elevation is noted.  No significant changes in the lateral leads.   Possible increase QRS complexes in the precordial leads from comparison in October 2021. [PC]   1448 Chest pain, negative troponin, heart score greater than 3, plan observation for possible acute coronary syndrome.  Patient be admitted for the same. [PC]      ED Course User Index  [PC] Abel Bryan MD                                 Labs Reviewed   COMPREHENSIVE METABOLIC PANEL - Abnormal; Notable for the following components:       Result Value    Glucose 441 (*)     BUN 48 (*)     Creatinine 3.27 (*)     Sodium 131 (*)     Chloride 93 (*)     Albumin 3.30 (*)     Alkaline Phosphatase 134 (*)     eGFR Non  Amer 14 (*)     All other components within normal limits    Narrative:     GFR Normal >60  Chronic Kidney Disease <60  Kidney Failure <15     BNP (IN-HOUSE) - Abnormal; Notable for the following components:    proBNP 1,386.0 (*)     All other components within normal limits    Narrative:     Among patients with dyspnea, NT-proBNP is highly sensitive for the detection of acute congestive heart failure. In addition NT-proBNP of <300 pg/ml effectively rules out acute congestive heart failure with 99% negative predictive value.    Results may be falsely decreased if patient taking Biotin.     CBC WITH AUTO DIFFERENTIAL - Abnormal; Notable for the following components:    RBC 2.90 (*)     Hemoglobin 7.9 (*)     Hematocrit 24.7 (*)     Immature Grans % 0.9 (*)     Immature Grans, Absolute 0.07 (*)     All other components within normal limits   TROPONIN (IN-HOUSE) - Normal    Narrative:     Troponin T Reference Range:  <= 0.03 ng/mL-   Negative for AMI  >0.03 ng/mL-     Abnormal for myocardial necrosis.  Clinicians would have to utilize clinical acumen, EKG, Troponin and serial changes to determine if it is an Acute Myocardial Infarction or myocardial injury due to an underlying chronic condition.       Results may be falsely decreased if patient taking Biotin.     COVID-19 AND FLU A/B, NP SWAB IN  TRANSPORT MEDIA 8-12 HR TAT   RAINBOW DRAW    Narrative:     The following orders were created for panel order [facility] Draw.  Procedure                               Abnormality         Status                     ---------                               -----------         ------                     Green Top (Gel)[[id number]]                                  In process                 Lavender Top[[id number]]                                     In process                 Gold Top - SST[[id number]]                                   In process                 Light Blue Top[[id number]]                                   In process                   Please view results for these tests on the individual orders.   TROPONIN (IN-HOUSE)   CBC AND DIFFERENTIAL    Narrative:     The following orders were created for panel order CBC & Differential.  Procedure                               Abnormality         Status                     ---------                               -----------         ------                     CBC Auto Differential[[id number]]        Abnormal            Final result                 Please view results for these tests on the individual orders.   GREEN TOP   LAVENDER TOP   GOLD TOP - SST   LIGHT BLUE TOP       XR Chest 1 View   Final Result   Interval development of right lower lobe infiltrative   changes indicating pneumonitis.      Electronically signed by:  Hugo Russo MD  11/8/2021 3:32 PM [facility]   Workstation: [id number]                [facility]    Final diagnoses:   Chest pain, unspecified type   Hyperglycemia       ED Disposition  ED Disposition     ED Disposition Condition Comment    Decision to Admit  Level of Care: Telemetry [5]   Diagnosis: Chest pain, unspecified type [[id number]]   Admitting Physician: TARIQ CONDON [[id number]]   Attending Physician: TARIQ CONDON [[id number]]            No follow-up provider specified.       Medication List      No changes were made to your prescriptions during this  visit.          Abel Bryan MD  11/08/21 7752

## 2021-11-08 NOTE — ED NOTES
Applied telemetry box #[id number] and called to verify telemetry communication.      Bouchra Duff RN  11/08/21 8023

## 2021-11-08 NOTE — ED NOTES
EMS- Substernal chest pain came on suddenly 30 minutes ago. 81mg ASA and 0.4 sublingual nitro PTA    FSBG 490- stopped metformin when began dialysis x 2 weeks    Pt is oxygen dependent 3L NC continuous-COPD     Ayala Sepulveda, RN  11/08/21 9452

## 2021-11-08 NOTE — H&P
HISTORY AND PHYSICAL  NAME: Yamileth Slater  : 1960  MRN: [id number]    DATE OF ADMISSION:  2021     DATE & TIME SEEN: 21 at 21    PCP: Rianna Macias MD    CODE STATUS: Full code    CHIEF COMPLAINT:  Chest pain    HPI:  Yamileth Slater is a 61 y.o. female with a CMH of HTN, CAD, HLD, DM, CKD stage IV, COPD, and chronic respiratory failure on 3 L oxygen at home who presents complaining of chest pain. She was sitting in recliner at [facility] and started having sudden, intermittent, squeezing/pressure-like, substernal chest pain which radiated to her elbow. Deep breaths make it worse.     In the ED, patient was given Aspirin 324, nitro ointment for chest pain. 10 Units insulin were given for hyperglycemia. Chest pain resolved thereafter.    Of note, she was admitted from 10/11/21-10/28/21 for GI bleed foor which she required blood transfusion. She also had a capsule endoscopy which showed few small nonbleeding intestinal AVMs and single small intestinal polyp which GI planned to follow outpatient. She also receives outpatient HD on TTS. She was discharged to [facility]. She was admitted to  service for ACS rule out.    CONCURRENT MEDICAL HISTORY:  Past Medical History:   Diagnosis Date   • Acute blood loss anemia 2017    Likely due to gastric oozing at this time. - Dr. Duarte (GI) was consulted and has now signed off, will follow up outpatient - pill colonoscopy showed AVMs - continue to monitor   • Anxiety    • Carotid artery stenosis    • Chronic obstructive lung disease (HCC)    • CKD (chronic kidney disease) stage 4, GFR 15-29 ml/min ([facility])    • Colonic polyp    • Coronary arteriosclerosis    • Diabetes mellitus (HCC)    • Diabetic neuropathy (HCC)    • Ear pain, right 10/18/2021    - canal trauma due to patient scratching and DMT2 - added cortisporin ear drops   • Elevated troponin 10/12/2021    -most likely from CKD -Trending down -Neg chest pain   • GERD  (gastroesophageal reflux disease)    • GI bleed 5/13/2021    - GI will follow up outpatient - Protonix 40mg daily - Avoid medical DVT prophy and use mechanical at this time instead. - Continue to monitor - pill colonoscopy results showed AVMs   • History of transfusion    • Hypercholesterolemia    • Hypertension    • Hypomagnesemia 6/27/2021    Monitor and replace   • Morbid obesity (HCC)    • Nephrolithiasis    • Peripheral vascular disease (HCC)    • Sleep apnea    • Substance abuse (HCC)    • Vitamin D deficiency        PAST SURGICAL HISTORY:  Past Surgical History:   Procedure Laterality Date   • CARDIAC CATHETERIZATION N/A 7/14/2020   • CARDIAC CATHETERIZATION N/A 4/23/2021    Procedure: Left Heart Cath;  Surgeon: Melba Romo MD;  Location: [facility] CATH INVASIVE LOCATION;  Service: Cardiology;  Laterality: N/A;   • CARDIAC CATHETERIZATION N/A 4/30/2021    Procedure: Percutaneous Coronary Intervention;  Surgeon: Russell Voss MD;  Location: [facility] CATH INVASIVE LOCATION;  Service: Cardiovascular;  Laterality: N/A;   • CARDIAC CATHETERIZATION N/A 4/30/2021    Procedure: Stent NIKKI coronary;  Surgeon: Russell Voss MD;  Location: [facility] CATH INVASIVE LOCATION;  Service: Cardiovascular;  Laterality: N/A;   • CAROTID STENT Left    • COLONOSCOPY     • COLONOSCOPY N/A 5/14/2021    Procedure: COLONOSCOPY;  Surgeon: Mingo Duarte MD;  Location: [facility] ENDOSCOPY;  Service: Gastroenterology;  Laterality: N/A;   • CORONARY ARTERY BYPASS GRAFT N/A 2013    CABG X 3   • CYSTOSCOPY BLADDER STONE LITHOTRIPSY Bilateral    • ENDOSCOPY N/A 4/12/2021    Procedure: ESOPHAGOGASTRODUODENOSCOPY;  Surgeon: Mingo Duarte MD;  Location: [facility] ENDOSCOPY;  Service: Gastroenterology;  Laterality: N/A;   • ENDOSCOPY N/A 5/14/2021    Procedure: ESOPHAGOGASTRODUODENOSCOPY;  Surgeon: Mingo Duarte MD;  Location: [facility] ENDOSCOPY;  Service: Gastroenterology;  Laterality: N/A;   • INTERVENTIONAL RADIOLOGY  PROCEDURE N/A 10/21/2021    Procedure: tunneled central venous catheter placement;  Surgeon: Donnie Robles MD;  Location: [facility] ANGIO INVASIVE LOCATION;  Service: Interventional Radiology;  Laterality: N/A;       FAMILY HISTORY:  Family History   Problem Relation Age of Onset   • Heart disease Mother    • Heart disease Father    • Heart disease Sister    • Heart disease Brother         SOCIAL HISTORY:  Social History     Socioeconomic History   • Marital status:      Spouse name: wesley dumont   Tobacco Use   • Smoking status: Former Smoker     Packs/day: 0.25     Years: 46.00     Pack years: 11.50     Types: Cigarettes   • Smokeless tobacco: Never Used   • Tobacco comment: only smoking 5 a day - quit april 23 2021   Substance and Sexual Activity   • Alcohol use: No   • Drug use: Not Currently     Types: LSD, Marijuana, Methamphetamines   • Sexual activity: Not Currently       HOME MEDICATIONS:  Prior to Admission medications    Medication Sig Start Date End Date Taking? Authorizing Provider   acetaminophen (Tylenol 8 Hour) 650 MG 8 hr tablet Take 1 tablet by mouth Every 8 (Eight) Hours As Needed for Mild Pain . 11/5/21   Blake Burris MD   albuterol (PROVENTIL) (2.5 MG/3ML) 0.083% nebulizer solution Inhale the contents of 1 vial by nebulization Every 4 (Four) Hours As Needed for Wheezing. 10/28/21   Haily Mcneil MD   albuterol sulfate  (90 Base) MCG/ACT inhaler Inhale 2 puffs Every 4 (Four) Hours As Needed for Wheezing. 3/26/21   Nirmal Calvillo MD   aspirin (aspirin) 81 MG EC tablet Take 1 tablet by mouth Daily. 5/1/21   Jr Jaime Estrada MD   atorvastatin (LIPITOR) 20 MG tablet TAKE ONE TABLET BY MOUTH EVERY NIGHT AT BEDTIME 10/8/21   Blake Burris MD   Blood Glucose Monitoring Suppl ([employer] Blood Glucose Meter) w/Device kit 1 each 3 (Three) Times a Day. 10/9/20   Nirmal Calvillo MD   bumetanide (BUMEX) 2 MG tablet Take 1 tablet by mouth 4 (Four) Times a  Week. 10/30/21   Haily Mcneil MD   cetirizine (zyrTEC) 10 MG tablet Take 1 tablet by mouth Daily. 3/26/21   Nirmal Calvillo MD   clopidogrel (PLAVIX) 75 MG tablet Take 1 tablet by mouth Daily. 3/26/21   Nirmal Calvillo MD   Continuous Blood Gluc  (FreeStyle Jade 2 [place]) device 1 each Continuous. 3/31/21   Nirmal Calvillo MD   Continuous Blood Gluc Sensor (FreeStyle Jade 2 Sensor) misc 1 each Every 14 (Fourteen) Days. 3/31/21   Nirmal Calvillo MD   cyclobenzaprine (FLEXERIL) 10 MG tablet Take 1 tablet by mouth 2 (Two) Times a Day As Needed for Muscle Spasms. 3/26/21   Nirmal Calvillo MD   Dulaglutide (Trulicity) 0.75 MG/0.5ML solution pen-injector Inject 0.75 mg (1 pen) under the skin into the appropriate area as directed 1 (One) Time Per Week. 10/28/21   Haily Mcneil MD   DULoxetine (CYMBALTA) 20 MG capsule TAKE 1 CAPSULE BY MOUTH DAILY. 7/8/21   Nirmal Calvillo MD   EASY TOUCH PEN NEEDLES 31G X 8 MM misc  8/7/20   ProviderCaio MD   ferrous sulfate (FeroSul) 325 (65 FE) MG tablet Take 1 tablet by mouth Daily With Breakfast. 3/26/21   Nirmal Calvillo MD   gabapentin (NEURONTIN) 400 MG capsule Take 1 capsule by mouth 3 (Three) Times a Day. 10/28/21   Radha Argueta MD   glucose blood test strip Use as instructed 10/9/20   Nirmal Calvillo MD   insulin aspart (novoLOG FLEXPEN) 100 UNIT/ML solution pen-injector sc pen Inject 30 Units under the skin into the appropriate area as directed 3 (Three) Times a Day With Meals. 10/28/21   Haily Mcneil MD   insulin degludec (Tresiba FlexTouch) 100 UNIT/ML solution pen-injector injection Inject 50 Units under the skin into the appropriate area as directed Every Night. 10/28/21   Haily Mcneil MD   Insulin Pen Needle (NovoFine) 30G X 8 MM misc As directed 4 times daily 3/26/21   Nirmal Calvillo MD   Insulin Pen Needle 31G X 8 MM misc Use to inject insulin 4 (Four) Times a  Day as directed. 10/28/21   Haily Mcneil MD   ipratropium-albuterol (DUO-NEB) 0.5-2.5 mg/3 ml nebulizer Take 3 mL by nebulization 4 (Four) Times a Day. 3/22/17   Caio Thompson MD   isosorbide mononitrate (IMDUR) 60 MG 24 hr tablet Take 1 tablet by mouth Every Morning.  Patient taking differently: Take 0.5 tablets by mouth Every Morning. TAKING 30 MG  8/16/21   Margarito Davis MD   levothyroxine (SYNTHROID, LEVOTHROID) 25 MCG tablet Take 25 mcg by mouth Daily. 9/1/21   Yadira Delarosa MD   lidocaine (LIDODERM) 5 % APPLY TO THE AFFECTED AREA(S) TOPICALLY TWO TIMES A DAY. REMOVE NIGHTLY 10/8/21   Blake Burris MD   lisinopril (PRINIVIL,ZESTRIL) 10 MG tablet Take 1 tablet by mouth Daily. 12/23/20   Nirmal Calvillo MD   metoprolol tartrate (LOPRESSOR) 50 MG tablet TAKE ONE TABLET BY MOUTH TWO TIMES A DAY. HOLD IF PULSE IS LESS THAN 60 10/8/21   Blake Burris MD   montelukast (SINGULAIR) 10 MG tablet Take 1 tablet by mouth Every Night. 3/26/21   Nirmal Calvillo MD   nitroglycerin (NITROSTAT) 0.4 MG SL tablet Place 0.4 mg under the tongue Every 5 (Five) Minutes As Needed for Chest Pain (x 3 doses). 1/1/15   Caio Thompson MD   nystatin (MYCOSTATIN) 984830 UNIT/GM powder Apply  topically to the appropriate area as directed 3 (Three) Times a Day. 10/1/21   Carline Coffey MD   ondansetron ODT (Zofran ODT) 4 MG disintegrating tablet Place 1 tablet on the tongue Every 8 (Eight) Hours As Needed for Nausea or Vomiting. 3/26/21   Nirmal Calvillo MD   oxybutynin XL (DITROPAN-XL) 5 MG 24 hr tablet Take 1 tablet by mouth Daily. 3/26/21   Nirmal Calvillo MD   pantoprazole (PROTONIX) 40 MG EC tablet Take 1 tablet by mouth Every Night. 3/26/21   Nirmal Calvillo MD   promethazine (PHENERGAN) 25 MG tablet Take 25 mg by mouth Every 6 (Six) Hours As Needed. 7/13/21   Provider, MD Caio   sennosides-docusate (PERICOLACE) 8.6-50 MG per tablet  Take 2 tablets by mouth 2 (Two) Times a Day. 10/28/21   Haily Mnceil MD   sodium bicarbonate 650 MG tablet Take 1 tablet by mouth 2 (Two) Times a Day.  Patient taking differently: Take 2 tablets by mouth 2 (Two) Times a Day. 4/12/21   Umesh Giraldo III, MD       ALLERGIES:  Adhesive tape and Other    REVIEW OF SYSTEMS  Review of Systems   Constitutional: Negative for chills and fever.   HENT: Negative for facial swelling and trouble swallowing.    Eyes: Negative for photophobia and visual disturbance.   Respiratory: Positive for shortness of breath. Negative for apnea and chest tightness.    Cardiovascular: Negative for chest pain.   Gastrointestinal: Positive for diarrhea. Negative for abdominal distention, nausea and vomiting.   Genitourinary: Negative for pelvic pain.   Musculoskeletal: Negative for arthralgias and myalgias.   Neurological: Negative for seizures and speech difficulty.   Psychiatric/Behavioral: Negative for confusion and hallucinations.       PHYSICAL EXAM:  Temp:  [96.5 °F (35.8 °C)-98 °F (36.7 °C)] 96.5 °F (35.8 °C)  Heart Rate:  [67-72] 67  Resp:  [18-20] 20  BP: (152-172)/(69-74) 152/69  Body mass index is 53.01 kg/m².  Physical Exam  Constitutional:       Appearance: She is well-developed. She is obese.   HENT:      Nose: Nose normal.   Eyes:      Conjunctiva/sclera: Conjunctivae normal.      Pupils: Pupils are equal, round, and reactive to light.   Cardiovascular:      Rate and Rhythm: Normal rate and regular rhythm.      Pulses: Normal pulses.      Heart sounds: Normal heart sounds.   Pulmonary:      Effort: Pulmonary effort is normal. No respiratory distress.      Breath sounds: Normal breath sounds.   Abdominal:      General: Abdomen is flat. Bowel sounds are normal. There is no distension.      Palpations: Abdomen is soft.   Musculoskeletal:         General: Normal range of motion.      Cervical back: Normal range of motion and neck supple.      Right lower leg: Edema present.       Left lower leg: Edema present.   Skin:     General: Skin is warm and dry.   Neurological:      General: No focal deficit present.      Mental Status: She is alert and oriented to person, place, and time. Mental status is at baseline.      Cranial Nerves: No cranial nerve deficit.   Psychiatric:         Mood and Affect: Mood normal.         Behavior: Behavior normal.         DIAGNOSTIC DATA:   Lab Results (last 24 hours)     Procedure Component Value Units Date/Time    Troponin [[id number]]  (Normal) Collected: 11/08/21 1724    Specimen: Blood Updated: 11/08/21 1747     Troponin T 0.024 ng/mL     Narrative:      Troponin T Reference Range:  <= 0.03 ng/mL-   Negative for AMI  >0.03 ng/mL-     Abnormal for myocardial necrosis.  Clinicians would have to utilize clinical acumen, EKG, Troponin and serial changes to determine if it is an Acute Myocardial Infarction or myocardial injury due to an underlying chronic condition.       Results may be falsely decreased if patient taking Biotin.      POC Glucose Once [[id number]]  (Abnormal) Collected: 11/08/21 1625    Specimen: Blood Updated: 11/08/21 1651     Glucose 392 mg/dL      Comment: RN NotifiedOperator: [id number] ANGEL Reyes ID: [id number]       Troponin [[id number]]  (Normal) Collected: 11/08/21 1631    Specimen: Blood Updated: 11/08/21 1649     Troponin T 0.026 ng/mL     Narrative:      Troponin T Reference Range:  <= 0.03 ng/mL-   Negative for AMI  >0.03 ng/mL-     Abnormal for myocardial necrosis.  Clinicians would have to utilize clinical acumen, EKG, Troponin and serial changes to determine if it is an Acute Myocardial Infarction or myocardial injury due to an underlying chronic condition.       Results may be falsely decreased if patient taking Biotin.      [facility] Draw [[id number]] Collected: 11/08/21 1450    Specimen: Blood Updated: 11/08/21 1601    Narrative:      The following orders were created for panel order [facility] Draw.  Procedure                                Abnormality         Status                     ---------                               -----------         ------                     Green Top (Gel)[[id number]]                                  Final result               Lavender Top[[id number]]                                     Final result               Gold Top - SST[[id number]]                                   Final result               Light Blue Top[[id number]]                                   Final result                 Please view results for these tests on the individual orders.    Gold Top - SST [[id number]] Collected: 11/08/21 1450    Specimen: Blood Updated: 11/08/21 1601     Extra Tube Hold for add-ons.     Comment: Auto resulted.       Light Blue Top [[id number]] Collected: 11/08/21 1450    Specimen: Blood Updated: 11/08/21 1601     Extra Tube hold for add-on     Comment: Auto resulted       Green Top (Gel) [[id number]] Collected: 11/08/21 1450    Specimen: Blood Updated: 11/08/21 1601     Extra Tube Hold for add-ons.     Comment: Auto resulted.       Lavender Top [[id number]] Collected: 11/08/21 1450    Specimen: Blood Updated: 11/08/21 1601     Extra Tube hold for add-on     Comment: Auto resulted       COVID-19 and FLU A/B PCR - Swab, Nasopharynx [[id number]]  (Normal) Collected: 11/08/21 1453    Specimen: Swab from Nasopharynx Updated: 11/08/21 1552     COVID19 Not Detected     Influenza A PCR Not Detected     Influenza B PCR Not Detected    Narrative:      Fact sheet for providers: https://www.fda.gov/media/688804/download    Fact sheet for patients: https://www.fda.gov/media/274781/download    Test performed by PCR.    Comprehensive Metabolic Panel [[id number]]  (Abnormal) Collected: 11/08/21 1450    Specimen: Blood Updated: 11/08/21 1519     Glucose 441 mg/dL      BUN 48 mg/dL      Creatinine 3.27 mg/dL      Sodium 131 mmol/L      Potassium 4.1 mmol/L      Chloride 93 mmol/L      CO2 25.0 mmol/L      Calcium 8.7 mg/dL      Total  Protein 7.1 g/dL      Albumin 3.30 g/dL      ALT (SGPT) 8 U/L      AST (SGOT) 11 U/L      Alkaline Phosphatase 134 U/L      Total Bilirubin 0.2 mg/dL      eGFR Non African Amer 14 mL/min/1.73      Comment: <15 Indicative of kidney failure.        eGFR   Amer --     Comment: <15 Indicative of kidney failure.        Globulin 3.8 gm/dL      A/G Ratio 0.9 g/dL      BUN/Creatinine Ratio 14.7     Anion Gap 13.0 mmol/L     Narrative:      GFR Normal >60  Chronic Kidney Disease <60  Kidney Failure <15      Troponin [[id number]]  (Normal) Collected: 11/08/21 1450    Specimen: Blood Updated: 11/08/21 1516     Troponin T 0.025 ng/mL     Narrative:      Troponin T Reference Range:  <= 0.03 ng/mL-   Negative for AMI  >0.03 ng/mL-     Abnormal for myocardial necrosis.  Clinicians would have to utilize clinical acumen, EKG, Troponin and serial changes to determine if it is an Acute Myocardial Infarction or myocardial injury due to an underlying chronic condition.       Results may be falsely decreased if patient taking Biotin.      BNP [[id number]]  (Abnormal) Collected: 11/08/21 1450    Specimen: Blood Updated: 11/08/21 1514     proBNP 1,386.0 pg/mL     Narrative:      Among patients with dyspnea, NT-proBNP is highly sensitive for the detection of acute congestive heart failure. In addition NT-proBNP of <300 pg/ml effectively rules out acute congestive heart failure with 99% negative predictive value.    Results may be falsely decreased if patient taking Biotin.      CBC & Differential [[id number]]  (Abnormal) Collected: 11/08/21 1450    Specimen: Blood Updated: 11/08/21 1142    Narrative:      The following orders were created for panel order CBC & Differential.  Procedure                               Abnormality         Status                     ---------                               -----------         ------                     CBC Auto Differential[[id number]]        Abnormal            Final result                  Please view results for these tests on the individual orders.    CBC Auto Differential [[id number]]  (Abnormal) Collected: 11/08/21 1450    Specimen: Blood Updated: 11/08/21 1502     WBC 8.14 10*3/mm3      RBC 2.90 10*6/mm3      Hemoglobin 7.9 g/dL      Hematocrit 24.7 %      MCV 85.2 fL      MCH 27.2 pg      MCHC 32.0 g/dL      RDW 14.8 %      RDW-SD 45.1 fl      MPV 8.7 fL      Platelets 252 10*3/mm3      Neutrophil % 61.6 %      Lymphocyte % 26.3 %      Monocyte % 6.9 %      Eosinophil % 3.6 %      Basophil % 0.7 %      Immature Grans % 0.9 %      Neutrophils, Absolute 5.02 10*3/mm3      Lymphocytes, Absolute 2.14 10*3/mm3      Monocytes, Absolute 0.56 10*3/mm3      Eosinophils, Absolute 0.29 10*3/mm3      Basophils, Absolute 0.06 10*3/mm3      Immature Grans, Absolute 0.07 10*3/mm3      nRBC 0.0 /100 WBC            Imaging Results (Last 24 Hours)     Procedure Component Value Units Date/Time    XR Chest 1 View [[id number]] Collected: 11/08/21 1450     Updated: 11/08/21 1533    Narrative:      Chest x-ray single view.         CLINICAL INDICATION: Shortness of breath and right-sided chest  pain.    COMPARISON: Chest October 21, 2021    FINDINGS: Cardiac silhouette is enlarged in size. Pulmonary  vascularity is unremarkable.   Midline sternal sutures from prior cardiac surgery. Dual lumen  tunneled central venous catheter, right jugular approach with  catheter tip in superior vena cava in satisfactory position.    Interval development of right lower lobe infiltrative changes  compatible with pneumonitis.      Impression:      Interval development of right lower lobe infiltrative  changes indicating pneumonitis.    Electronically signed by:  Hugo Russo MD  11/8/2021 3:32 PM CST  Workstation: [id number]            I reviewed the patient's new clinical results.    ASSESSMENT AND PLAN: This is a 61 y.o. female with:    Active Hospital Problems    Diagnosis  POA   • **Chest pain [R07.9]  Yes     Priority: High     -S/P  CABG x 3 (Primary), Bilateral carotid artery stenosis w/ 2 stents on left side,  PAD (peripheral artery disease) with stent in right upper leg, Morbid obesity   -Continue aspirin, Plavix, atorvastatin, lisinopril,  isosorbide mononitrate 60 mg daily     -EKG: No ST segment changes.   -Initial troponin negative. Will continue to trend  -NPO after midnight  -Stress test in the AM       • Anemia [D64.9]  Unknown     Priority: High     -Anemia of chronic disease vs GI bleed  -No active bleeding on recent EGD/Colonoscopy  -Recent capsule endoscopy which showed few small nonbleeding intestinal AVMs and single small intestinal polyp which GI planned to follow outpatient  -Drop in Hgb to 7.9 today from recent admission  -Continue to monitor H/H  -Will transfuse if Hgb <8  -Consider GI consult in the AM  -Continue home ferrous sulfate. May be a candidate for epogen and IV iron  -Follow stool occult test     • CKD (chronic kidney disease), symptom management only, stage 5 (HCC) [N18.5]  Yes     Priority: High     Results from last 7 days   Lab Units 11/08/21  1450   CREATININE mg/dL 3.27*     -Completes outpatient HD on TTS  -Nephrology on board. Will require HD in house     • Type 2 diabetes mellitus with hyperglycemia ([facility]) [E11.65]  Unknown     -Continue 20 units of levemir in the morning , 30 units of novolog with meals, and 50 units of levemir at night  -[facility] for additional coverage     • Pneumonitis [J18.9]  Unknown   • Acute on chronic congestive heart failure (HCC) [I50.9]  Yes     - Continue Imdur, metoprolol, lisinopril      • COPD (chronic obstructive pulmonary disease) (HCC) [J44.9]  Yes     -O2 supplementation  -Home inhalers as needed  -DuoNebs  -CPAP at night          DVT prophylaxis: Heparin     Yamileth Slater and I have discussed pain goals for this hospitalization after reviewing her current clinical condition, medical history and prior pain experiences.  The goal is to keep the pain level <2.  To help  achieve this, I plan to use prn tylenol.    JULIAN reviewed via PDMP and consistent with patient reported medications.    Expected Length of Stay: Home in 1-2 days    I discussed the patient's findings and my recommendations with patient.     Kit Brar MD is the attending on record at time of admission, He is aware of the patient's status and agrees with the above history and physical.          PGY-3  [facility]  This document has been electronically signed by Carline Coffey MD on November 8, 2021 18:04 CST

## 2021-11-08 NOTE — ACP (ADVANCE CARE PLANNING)
Patient states that she would like her CODE STATUS to be full Code.  In the event that she cannot make any medical decisions, she would like to designate her spouse Huy Slater or daughter Yamileth Chavez to make decision on her behalf.          PGY-3  [facility]  This document has been electronically signed by Carline Coffey MD on November 8, 2021 17:41 CST

## 2021-11-09 ENCOUNTER — APPOINTMENT (OUTPATIENT)
Dept: NUCLEAR MEDICINE | Facility: HOSPITAL | Age: 61
End: 2021-11-09

## 2021-11-09 LAB
ALBUMIN SERPL-MCNC: 3.5 G/DL (ref 3.5–5.2)
ALBUMIN/GLOB SERPL: 0.9 G/DL
ALP SERPL-CCNC: 127 U/L (ref 39–117)
ALT SERPL W P-5'-P-CCNC: 9 U/L (ref 1–33)
ANION GAP SERPL CALCULATED.3IONS-SCNC: 12 MMOL/L (ref 5–15)
AST SERPL-CCNC: 13 U/L (ref 1–32)
BASOPHILS # BLD AUTO: 0.12 10*3/MM3 (ref 0–0.2)
BASOPHILS NFR BLD AUTO: 1.2 % (ref 0–1.5)
BILIRUB SERPL-MCNC: 0.2 MG/DL (ref 0–1.2)
BUN SERPL-MCNC: 51 MG/DL (ref 8–23)
BUN/CREAT SERPL: 16.1 (ref 7–25)
CALCIUM SPEC-SCNC: 9.1 MG/DL (ref 8.6–10.5)
CHLORIDE SERPL-SCNC: 95 MMOL/L (ref 98–107)
CO2 SERPL-SCNC: 25 MMOL/L (ref 22–29)
CREAT SERPL-MCNC: 3.16 MG/DL (ref 0.57–1)
DEPRECATED RDW RBC AUTO: 44.5 FL (ref 37–54)
EOSINOPHIL # BLD AUTO: 0.28 10*3/MM3 (ref 0–0.4)
EOSINOPHIL NFR BLD AUTO: 2.8 % (ref 0.3–6.2)
ERYTHROCYTE [DISTWIDTH] IN BLOOD BY AUTOMATED COUNT: 14.8 % (ref 12.3–15.4)
GFR SERPL CREATININE-BSD FRML MDRD: 15 ML/MIN/1.73
GLOBULIN UR ELPH-MCNC: 3.9 GM/DL
GLUCOSE BLDC GLUCOMTR-MCNC: 206 MG/DL (ref 70–130)
GLUCOSE BLDC GLUCOMTR-MCNC: 236 MG/DL (ref 70–130)
GLUCOSE BLDC GLUCOMTR-MCNC: 341 MG/DL (ref 70–130)
GLUCOSE SERPL-MCNC: 215 MG/DL (ref 65–99)
HCT VFR BLD AUTO: 26.3 % (ref 34–46.6)
HGB BLD-MCNC: 8.4 G/DL (ref 12–15.9)
IMM GRANULOCYTES # BLD AUTO: 0.08 10*3/MM3 (ref 0–0.05)
IMM GRANULOCYTES NFR BLD AUTO: 0.8 % (ref 0–0.5)
LYMPHOCYTES # BLD AUTO: 1.78 10*3/MM3 (ref 0.7–3.1)
LYMPHOCYTES NFR BLD AUTO: 17.8 % (ref 19.6–45.3)
MCH RBC QN AUTO: 27 PG (ref 26.6–33)
MCHC RBC AUTO-ENTMCNC: 31.9 G/DL (ref 31.5–35.7)
MCV RBC AUTO: 84.6 FL (ref 79–97)
MONOCYTES # BLD AUTO: 0.68 10*3/MM3 (ref 0.1–0.9)
MONOCYTES NFR BLD AUTO: 6.8 % (ref 5–12)
NEUTROPHILS NFR BLD AUTO: 7.07 10*3/MM3 (ref 1.7–7)
NEUTROPHILS NFR BLD AUTO: 70.6 % (ref 42.7–76)
NRBC BLD AUTO-RTO: 0 /100 WBC (ref 0–0.2)
PLATELET # BLD AUTO: 265 10*3/MM3 (ref 140–450)
PMV BLD AUTO: 9 FL (ref 6–12)
POTASSIUM SERPL-SCNC: 4.2 MMOL/L (ref 3.5–5.2)
PROT SERPL-MCNC: 7.4 G/DL (ref 6–8.5)
RBC # BLD AUTO: 3.11 10*6/MM3 (ref 3.77–5.28)
SODIUM SERPL-SCNC: 132 MMOL/L (ref 136–145)
WBC # BLD AUTO: 10.01 10*3/MM3 (ref 3.4–10.8)

## 2021-11-09 PROCEDURE — G0378 HOSPITAL OBSERVATION PER HR: HCPCS

## 2021-11-09 PROCEDURE — 94664 DEMO&/EVAL PT USE INHALER: CPT

## 2021-11-09 PROCEDURE — 85025 COMPLETE CBC W/AUTO DIFF WBC: CPT | Performed by: STUDENT IN AN ORGANIZED HEALTH CARE EDUCATION/TRAINING PROGRAM

## 2021-11-09 PROCEDURE — 82962 GLUCOSE BLOOD TEST: CPT

## 2021-11-09 PROCEDURE — 63710000001 INSULIN ASPART PER 5 UNITS: Performed by: STUDENT IN AN ORGANIZED HEALTH CARE EDUCATION/TRAINING PROGRAM

## 2021-11-09 PROCEDURE — 78452 HT MUSCLE IMAGE SPECT MULT: CPT

## 2021-11-09 PROCEDURE — 25010000002 HEPARIN (PORCINE) PER 1000 UNITS: Performed by: INTERNAL MEDICINE

## 2021-11-09 PROCEDURE — A9500 TC99M SESTAMIBI: HCPCS | Performed by: FAMILY MEDICINE

## 2021-11-09 PROCEDURE — 94799 UNLISTED PULMONARY SVC/PX: CPT

## 2021-11-09 PROCEDURE — 80053 COMPREHEN METABOLIC PANEL: CPT | Performed by: STUDENT IN AN ORGANIZED HEALTH CARE EDUCATION/TRAINING PROGRAM

## 2021-11-09 PROCEDURE — 0 TECHNETIUM SESTAMIBI: Performed by: FAMILY MEDICINE

## 2021-11-09 PROCEDURE — 93017 CV STRESS TEST TRACING ONLY: CPT

## 2021-11-09 PROCEDURE — 63710000001 INSULIN DETEMIR PER 5 UNITS: Performed by: STUDENT IN AN ORGANIZED HEALTH CARE EDUCATION/TRAINING PROGRAM

## 2021-11-09 PROCEDURE — 25010000002 HEPARIN (PORCINE) PER 1000 UNITS: Performed by: STUDENT IN AN ORGANIZED HEALTH CARE EDUCATION/TRAINING PROGRAM

## 2021-11-09 PROCEDURE — 94760 N-INVAS EAR/PLS OXIMETRY 1: CPT

## 2021-11-09 PROCEDURE — G0257 UNSCHED DIALYSIS ESRD PT HOS: HCPCS

## 2021-11-09 RX ORDER — HEPARIN SODIUM 1000 [USP'U]/ML
2000 INJECTION, SOLUTION INTRAVENOUS; SUBCUTANEOUS AS NEEDED
Status: DISCONTINUED | OUTPATIENT
Start: 2021-11-09 | End: 2021-11-15 | Stop reason: HOSPADM

## 2021-11-09 RX ORDER — ALBUMIN (HUMAN) 12.5 G/50ML
12.5 SOLUTION INTRAVENOUS AS NEEDED
Status: ACTIVE | OUTPATIENT
Start: 2021-11-09 | End: 2021-11-10

## 2021-11-09 RX ADMIN — GABAPENTIN 400 MG: 400 CAPSULE ORAL at 15:11

## 2021-11-09 RX ADMIN — MONTELUKAST 10 MG: 10 TABLET, FILM COATED ORAL at 21:05

## 2021-11-09 RX ADMIN — ASPIRIN 81 MG: 81 TABLET, FILM COATED ORAL at 08:23

## 2021-11-09 RX ADMIN — HEPARIN SODIUM 2000 UNITS: 1000 INJECTION INTRAVENOUS; SUBCUTANEOUS at 19:09

## 2021-11-09 RX ADMIN — CETIRIZINE HYDROCHLORIDE 10 MG: 10 TABLET, FILM COATED ORAL at 08:23

## 2021-11-09 RX ADMIN — IPRATROPIUM BROMIDE AND ALBUTEROL SULFATE 3 ML: 2.5; .5 SOLUTION RESPIRATORY (INHALATION) at 10:48

## 2021-11-09 RX ADMIN — INSULIN ASPART 30 UNITS: 100 INJECTION, SOLUTION INTRAVENOUS; SUBCUTANEOUS at 19:11

## 2021-11-09 RX ADMIN — NYSTATIN: 100000 POWDER TOPICAL at 08:40

## 2021-11-09 RX ADMIN — NYSTATIN: 100000 POWDER TOPICAL at 21:10

## 2021-11-09 RX ADMIN — TECHNETIUM TC 99M SESTAMIBI 1 DOSE: 1 INJECTION INTRAVENOUS at 13:23

## 2021-11-09 RX ADMIN — LIDOCAINE 1 PATCH: 700 PATCH TOPICAL at 08:22

## 2021-11-09 RX ADMIN — SODIUM CHLORIDE, PRESERVATIVE FREE 10 ML: 5 INJECTION INTRAVENOUS at 08:22

## 2021-11-09 RX ADMIN — METOPROLOL TARTRATE 50 MG: 50 TABLET, FILM COATED ORAL at 21:06

## 2021-11-09 RX ADMIN — IPRATROPIUM BROMIDE AND ALBUTEROL SULFATE 3 ML: 2.5; .5 SOLUTION RESPIRATORY (INHALATION) at 06:34

## 2021-11-09 RX ADMIN — BUMETANIDE 2 MG: 1 TABLET ORAL at 08:23

## 2021-11-09 RX ADMIN — SODIUM BICARBONATE 650 MG: 650 TABLET ORAL at 08:23

## 2021-11-09 RX ADMIN — INSULIN DETEMIR 50 UNITS: 100 INJECTION, SOLUTION SUBCUTANEOUS at 21:06

## 2021-11-09 RX ADMIN — INSULIN ASPART 3 UNITS: 100 INJECTION, SOLUTION INTRAVENOUS; SUBCUTANEOUS at 08:21

## 2021-11-09 RX ADMIN — HEPARIN SODIUM 5000 UNITS: 5000 INJECTION INTRAVENOUS; SUBCUTANEOUS at 21:06

## 2021-11-09 RX ADMIN — ACETAMINOPHEN 650 MG: 325 TABLET, FILM COATED ORAL at 10:54

## 2021-11-09 RX ADMIN — DULOXETINE HYDROCHLORIDE 20 MG: 20 CAPSULE, DELAYED RELEASE ORAL at 08:23

## 2021-11-09 RX ADMIN — GABAPENTIN 400 MG: 400 CAPSULE ORAL at 21:06

## 2021-11-09 RX ADMIN — ISOSORBIDE MONONITRATE 30 MG: 30 TABLET, EXTENDED RELEASE ORAL at 06:19

## 2021-11-09 RX ADMIN — IPRATROPIUM BROMIDE AND ALBUTEROL SULFATE 3 ML: 2.5; .5 SOLUTION RESPIRATORY (INHALATION) at 19:38

## 2021-11-09 RX ADMIN — CLOPIDOGREL BISULFATE 75 MG: 75 TABLET ORAL at 08:23

## 2021-11-09 RX ADMIN — HEPARIN SODIUM 2000 UNITS: 1000 INJECTION INTRAVENOUS; SUBCUTANEOUS at 19:00

## 2021-11-09 RX ADMIN — GABAPENTIN 400 MG: 400 CAPSULE ORAL at 08:23

## 2021-11-09 RX ADMIN — PANTOPRAZOLE SODIUM 40 MG: 40 TABLET, DELAYED RELEASE ORAL at 21:06

## 2021-11-09 RX ADMIN — HEPARIN SODIUM 5000 UNITS: 5000 INJECTION INTRAVENOUS; SUBCUTANEOUS at 08:22

## 2021-11-09 RX ADMIN — INSULIN ASPART 30 UNITS: 100 INJECTION, SOLUTION INTRAVENOUS; SUBCUTANEOUS at 11:44

## 2021-11-09 RX ADMIN — INSULIN ASPART 3 UNITS: 100 INJECTION, SOLUTION INTRAVENOUS; SUBCUTANEOUS at 19:11

## 2021-11-09 RX ADMIN — OXYBUTYNIN CHLORIDE 5 MG: 5 TABLET, EXTENDED RELEASE ORAL at 08:23

## 2021-11-09 RX ADMIN — INSULIN ASPART 30 UNITS: 100 INJECTION, SOLUTION INTRAVENOUS; SUBCUTANEOUS at 08:22

## 2021-11-09 RX ADMIN — INSULIN ASPART 3 UNITS: 100 INJECTION, SOLUTION INTRAVENOUS; SUBCUTANEOUS at 11:44

## 2021-11-09 RX ADMIN — LISINOPRIL 10 MG: 10 TABLET ORAL at 08:23

## 2021-11-09 RX ADMIN — NYSTATIN: 100000 POWDER TOPICAL at 15:11

## 2021-11-09 RX ADMIN — LEVOTHYROXINE SODIUM 25 MCG: 25 TABLET ORAL at 08:23

## 2021-11-09 RX ADMIN — METOPROLOL TARTRATE 50 MG: 50 TABLET, FILM COATED ORAL at 08:23

## 2021-11-09 RX ADMIN — FERROUS SULFATE TAB EC 324 MG (65 MG FE EQUIVALENT) 324 MG: 324 (65 FE) TABLET DELAYED RESPONSE at 08:23

## 2021-11-09 RX ADMIN — ATORVASTATIN CALCIUM 20 MG: 20 TABLET, FILM COATED ORAL at 08:23

## 2021-11-09 NOTE — SIGNIFICANT NOTE
11/09/21 0923   OTHER   Discipline physical therapist; occupational therapist   Rehab Time/Intention   Session Not Performed patient unavailable for evaluation   Recommendation   PT - Next Appointment 11/10/21   Recommendation   OT - Next Appointment 11/10/21     Initial PT/OT evaluations attempted.  Patient awaiting stress test then has dialysis. Therapy will f/u as able.

## 2021-11-09 NOTE — SIGNIFICANT NOTE
I went to consent the patient to receive a cardiac nuclear stress test tomorrow.  We discussed that nuclear stress test can reproduce chest pain and can also be associated with potential side effects of arrhythmias and dizziness.  The patient agreed to receive the test.      This document has been electronically signed by Alvarado Mauricio MD on November 9, 2021 17:49 CST

## 2021-11-09 NOTE — CONSULTS
[facility]  [address] [place] [address]. [address]  T - [phone]  F - [phone]     Consultation         PATIENT  DEMOGRAPHICS   PATIENT NAME: Yamileth Slater                      PHYSICIAN: BRIDGETT Hadley  : 1960  MRN: [id number]    Subjective   SUBJECTIVE   Referring Provider: Dr. Coffey  Reason for Consultation: ESRD on HD  History of present illness:  This is a 61-year-old female with a past medical history significant for COPD, CKD/ESRD, CAD, DM 2, hypertension, morbid obesity, PVD who presented to the hospital with chest pain. She was had multiple recent admissions with cellulitis, shortness of breath, fluid overload, ERIKA, and was started on HD in the most recent admission. She is now admitted for workup for chest pain and nephrology is consulted to manage her HD needs.    Past Medical History:   Diagnosis Date   • Acute blood loss anemia 2017    Likely due to gastric oozing at this time. - Dr. Duarte (GI) was consulted and has now signed off, will follow up outpatient - pill colonoscopy showed AVMs - continue to monitor   • Anxiety    • Carotid artery stenosis    • Chronic obstructive lung disease (HCC)    • CKD (chronic kidney disease) stage 4, GFR 15-29 ml/min (HCC)    • Colonic polyp    • Coronary arteriosclerosis    • Diabetes mellitus (HCC)    • Diabetic neuropathy (HCC)    • Ear pain, right 10/18/2021    - canal trauma due to patient scratching and DMT2 - added cortisporin ear drops   • Elevated troponin 10/12/2021    -most likely from CKD -Trending down -Neg chest pain   • GERD (gastroesophageal reflux disease)    • GI bleed 2021    - GI will follow up outpatient - Protonix 40mg daily - Avoid medical DVT prophy and use mechanical at this time instead. - Continue to monitor - pill colonoscopy results showed AVMs   • History of transfusion    • Hypercholesterolemia    • Hypertension    • Hypomagnesemia 2021    Monitor and replace   •  Morbid obesity (HCC)    • Nephrolithiasis    • Peripheral vascular disease (HCC)    • Sleep apnea    • Substance abuse (HCC)    • Vitamin D deficiency      Past Surgical History:   Procedure Laterality Date   • CARDIAC CATHETERIZATION N/A 7/14/2020   • CARDIAC CATHETERIZATION N/A 4/23/2021    Procedure: Left Heart Cath;  Surgeon: Melba Romo MD;  Location: [facility] CATH INVASIVE LOCATION;  Service: Cardiology;  Laterality: N/A;   • CARDIAC CATHETERIZATION N/A 4/30/2021    Procedure: Percutaneous Coronary Intervention;  Surgeon: Russell Voss MD;  Location: [facility] CATH INVASIVE LOCATION;  Service: Cardiovascular;  Laterality: N/A;   • CARDIAC CATHETERIZATION N/A 4/30/2021    Procedure: Stent NIKKI coronary;  Surgeon: Russell Voss MD;  Location: [facility] CATH INVASIVE LOCATION;  Service: Cardiovascular;  Laterality: N/A;   • CAROTID STENT Left    • COLONOSCOPY     • COLONOSCOPY N/A 5/14/2021    Procedure: COLONOSCOPY;  Surgeon: Mingo Duarte MD;  Location: [facility] ENDOSCOPY;  Service: Gastroenterology;  Laterality: N/A;   • CORONARY ARTERY BYPASS GRAFT N/A 2013    CABG X 3   • CYSTOSCOPY BLADDER STONE LITHOTRIPSY Bilateral    • ENDOSCOPY N/A 4/12/2021    Procedure: ESOPHAGOGASTRODUODENOSCOPY;  Surgeon: Mingo Duarte MD;  Location: [facility] ENDOSCOPY;  Service: Gastroenterology;  Laterality: N/A;   • ENDOSCOPY N/A 5/14/2021    Procedure: ESOPHAGOGASTRODUODENOSCOPY;  Surgeon: Mingo Duarte MD;  Location: [facility] ENDOSCOPY;  Service: Gastroenterology;  Laterality: N/A;   • INTERVENTIONAL RADIOLOGY PROCEDURE N/A 10/21/2021    Procedure: tunneled central venous catheter placement;  Surgeon: Donnie Robles MD;  Location: [facility] ANGIO INVASIVE LOCATION;  Service: Interventional Radiology;  Laterality: N/A;     Family History   Problem Relation Age of Onset   • Heart disease Mother    • Heart disease Father    • Heart disease Sister    • Heart disease Brother      Social History  "    Tobacco Use   • Smoking status: Former Smoker     Packs/day: 0.25     Years: 46.00     Pack years: 11.50     Types: Cigarettes   • Smokeless tobacco: Never Used   • Tobacco comment: only smoking 5 a day - quit april 23 2021   Substance Use Topics   • Alcohol use: No   • Drug use: Not Currently     Types: LSD, Marijuana, Methamphetamines     Allergies:  Adhesive tape and Other     REVIEW OF SYSTEMS    Review of Systems   Cardiovascular: Positive for chest pain.   All other systems reviewed and are negative.      Objective   OBJECTIVE   Vital Signs  Temp:  [96.5 °F (35.8 °C)-98.1 °F (36.7 °C)] 98 °F (36.7 °C)  Heart Rate:  [58-86] 71  Resp:  [18-20] 18  BP: (136-172)/(60-74) 136/60    Flowsheet Rows      First Filed Value   Admission Height 157.5 cm (62\") Documented at 11/08/2021 1434   Admission Weight 128 kg (282 lb) Documented at 11/08/2021 1434           I/O last 3 completed shifts:  In: 240 [P.O.:240]  Out: 1700 [Urine:1700]    PHYSICAL EXAM    Physical Exam  Constitutional:       Appearance: She is well-developed.   HENT:      Head: Normocephalic and atraumatic.   Eyes:      Conjunctiva/sclera: Conjunctivae normal.      Pupils: Pupils are equal, round, and reactive to light.   Cardiovascular:      Rate and Rhythm: Normal rate and regular rhythm.   Pulmonary:      Effort: Pulmonary effort is normal.      Breath sounds: Normal breath sounds.   Abdominal:      Palpations: Abdomen is soft.   Musculoskeletal:      Cervical back: Neck supple.      Right lower leg: No edema.      Left lower leg: No edema.   Skin:     General: Skin is warm and dry.   Neurological:      Mental Status: She is alert and oriented to person, place, and time.   Psychiatric:         Mood and Affect: Mood normal.         Behavior: Behavior normal.         RESULTS   Results Review:    Results from last 7 days   Lab Units 11/09/21  0608 11/08/21  1450   SODIUM mmol/L 132* 131*   POTASSIUM mmol/L 4.2 4.1   CHLORIDE mmol/L 95* 93*   CO2 mmol/L " 25.0 25.0   BUN mg/dL 51* 48*   CREATININE mg/dL 3.16* 3.27*   CALCIUM mg/dL 9.1 8.7   BILIRUBIN mg/dL 0.2 0.2   ALK PHOS U/L 127* 134*   ALT (SGPT) U/L 9 8   AST (SGOT) U/L 13 11   GLUCOSE mg/dL 215* 441*       Estimated Creatinine Clearance: 23.9 mL/min (A) (by C-G formula based on SCr of 3.16 mg/dL (H)).                Results from last 7 days   Lab Units 11/09/21  0608 11/08/21  1450   WBC 10*3/mm3 10.01 8.14   HEMOGLOBIN g/dL 8.4* 7.9*   PLATELETS 10*3/mm3 265 252              MEDICATIONS    aspirin, 81 mg, Oral, Daily  atorvastatin, 20 mg, Oral, Daily  bumetanide, 2 mg, Oral, Once per day on Sun Tue Thu Sat  cetirizine, 10 mg, Oral, Daily  clopidogrel, 75 mg, Oral, Daily  DULoxetine, 20 mg, Oral, Daily  ferrous sulfate, 324 mg, Oral, Daily With Breakfast  fluticasone, 2 spray, Each Nare, Daily  gabapentin, 400 mg, Oral, TID  heparin (porcine), 5,000 Units, Subcutaneous, Q12H  insulin aspart, 0-7 Units, Subcutaneous, TID AC  insulin aspart, 30 Units, Subcutaneous, TID With Meals  insulin detemir, 50 Units, Subcutaneous, Nightly  ipratropium-albuterol, 3 mL, Nebulization, 4x Daily - RT  isosorbide mononitrate, 30 mg, Oral, QAM  levothyroxine, 25 mcg, Oral, Daily  lidocaine, 1 patch, Transdermal, Q24H  lisinopril, 10 mg, Oral, Daily  metoprolol tartrate, 50 mg, Oral, Q12H  montelukast, 10 mg, Oral, Nightly  nystatin, , Topical, TID  oxybutynin XL, 5 mg, Oral, Daily  pantoprazole, 40 mg, Oral, Nightly  sennosides-docusate, 2 tablet, Oral, BID  sodium chloride, 10 mL, Intravenous, Q12H         Medications Prior to Admission   Medication Sig Dispense Refill Last Dose   • albuterol (PROVENTIL) (2.5 MG/3ML) 0.083% nebulizer solution Inhale the contents of 1 vial by nebulization Every 4 (Four) Hours As Needed for Wheezing. 75 mL 3 11/8/2021 at Unknown time   • albuterol sulfate  (90 Base) MCG/ACT inhaler Inhale 2 puffs Every 4 (Four) Hours As Needed for Wheezing. 18 g 1 Past Month at Unknown time   • aspirin  (aspirin) 81 MG EC tablet Take 1 tablet by mouth Daily. 60 tablet 5 11/8/2021 at Unknown time   • atorvastatin (LIPITOR) 20 MG tablet TAKE ONE TABLET BY MOUTH EVERY NIGHT AT BEDTIME 30 tablet 1 11/7/2021 at Unknown time   • cetirizine (zyrTEC) 10 MG tablet Take 1 tablet by mouth Daily. 30 tablet 3 11/8/2021 at Unknown time   • clopidogrel (PLAVIX) 75 MG tablet Take 1 tablet by mouth Daily. 30 tablet 5 11/8/2021 at Unknown time   • cyclobenzaprine (FLEXERIL) 10 MG tablet Take 1 tablet by mouth 2 (Two) Times a Day As Needed for Muscle Spasms. 60 tablet 5 11/7/2021 at Unknown time   • DULoxetine (CYMBALTA) 20 MG capsule TAKE 1 CAPSULE BY MOUTH DAILY. 30 capsule 2 11/8/2021 at Unknown time   • ferrous sulfate (FeroSul) 325 (65 FE) MG tablet Take 1 tablet by mouth Daily With Breakfast. 30 tablet 3 11/8/2021 at Unknown time   • gabapentin (NEURONTIN) 400 MG capsule Take 1 capsule by mouth 3 (Three) Times a Day. 90 capsule 0 11/8/2021 at Unknown time   • insulin aspart (novoLOG FLEXPEN) 100 UNIT/ML solution pen-injector sc pen Inject 30 Units under the skin into the appropriate area as directed 3 (Three) Times a Day With Meals. 30 mL 12 11/8/2021 at Unknown time   • insulin degludec (Tresiba FlexTouch) 100 UNIT/ML solution pen-injector injection Inject 50 Units under the skin into the appropriate area as directed Every Night. 15 mL 10 11/7/2021 at Unknown time   • ipratropium-albuterol (DUO-NEB) 0.5-2.5 mg/3 ml nebulizer Take 3 mL by nebulization 4 (Four) Times a Day.   11/8/2021 at Unknown time   • levothyroxine (SYNTHROID, LEVOTHROID) 25 MCG tablet Take 25 mcg by mouth Daily.   11/8/2021 at Unknown time   • lidocaine (LIDODERM) 5 % APPLY TO THE AFFECTED AREA(S) TOPICALLY TWO TIMES A DAY. REMOVE NIGHTLY 30 patch 1 11/8/2021 at Unknown time   • lisinopril (PRINIVIL,ZESTRIL) 10 MG tablet Take 1 tablet by mouth Daily. 30 tablet 5 11/8/2021 at Unknown time   • metoprolol tartrate (LOPRESSOR) 50 MG tablet TAKE ONE TABLET BY  MOUTH TWO TIMES A DAY. HOLD IF PULSE IS LESS THAN 60 60 tablet 1 11/8/2021 at Unknown time   • montelukast (SINGULAIR) 10 MG tablet Take 1 tablet by mouth Every Night. 30 tablet 5 11/8/2021 at Unknown time   • nitroglycerin (NITROSTAT) 0.4 MG SL tablet Place 0.4 mg under the tongue Every 5 (Five) Minutes As Needed for Chest Pain (x 3 doses).   11/8/2021 at Unknown time   • nystatin (MYCOSTATIN) 677432 UNIT/GM powder Apply  topically to the appropriate area as directed 3 (Three) Times a Day. 15 g 1 11/8/2021 at Unknown time   • ondansetron ODT (Zofran ODT) 4 MG disintegrating tablet Place 1 tablet on the tongue Every 8 (Eight) Hours As Needed for Nausea or Vomiting. 30 tablet 0 Past Week at Unknown time   • oxybutynin XL (DITROPAN-XL) 5 MG 24 hr tablet Take 1 tablet by mouth Daily. 90 tablet 1 11/8/2021 at Unknown time   • pantoprazole (PROTONIX) 40 MG EC tablet Take 1 tablet by mouth Every Night. 30 tablet 5 11/8/2021 at Unknown time   • sennosides-docusate (PERICOLACE) 8.6-50 MG per tablet Take 2 tablets by mouth 2 (Two) Times a Day. 60 tablet 2 11/8/2021 at Unknown time   • sodium bicarbonate 650 MG tablet Take 1 tablet by mouth 2 (Two) Times a Day. (Patient taking differently: Take 2 tablets by mouth 2 (Two) Times a Day.) 60 tablet 1 11/8/2021 at Unknown time   • acetaminophen (Tylenol 8 Hour) 650 MG 8 hr tablet Take 1 tablet by mouth Every 8 (Eight) Hours As Needed for Mild Pain . 90 tablet 0 More than a month at Unknown time   • Blood Glucose Monitoring Suppl ([employer] Blood Glucose Meter) w/Device kit 1 each 3 (Three) Times a Day. 1 kit 3    • bumetanide (BUMEX) 2 MG tablet Take 1 tablet by mouth 4 (Four) Times a Week. 30 tablet 0 Unknown at Unknown time   • Continuous Blood Gluc  (FreeStyle Jade 2 [place]) device 1 each Continuous. 1 each 0    • Continuous Blood Gluc Sensor (FreeStyle Jade 2 Sensor) misc 1 each Every 14 (Fourteen) Days. 2 each 11    • Dulaglutide (Trulicity) 0.75 MG/0.5ML solution  pen-injector Inject 0.75 mg (1 pen) under the skin into the appropriate area as directed 1 (One) Time Per Week. 2 mL 6 Unknown at Unknown time   • EASY TOUCH PEN NEEDLES 31G X 8 MM misc       • glucose blood test strip Use as instructed 100 each 12    • Insulin Pen Needle (NovoFine) 30G X 8 MM misc As directed 4 times daily 100 each 11    • Insulin Pen Needle 31G X 8 MM misc Use to inject insulin 4 (Four) Times a Day as directed. 120 each 12    • isosorbide mononitrate (IMDUR) 60 MG 24 hr tablet Take 1 tablet by mouth Every Morning. (Patient taking differently: Take 0.5 tablets by mouth Every Morning. TAKING 30 MG ) 90 tablet 3    • promethazine (PHENERGAN) 25 MG tablet Take 25 mg by mouth Every 6 (Six) Hours As Needed.   Unknown at Unknown time     Assessment/Plan   ASSESSMENT / PLAN      Chest pain    COPD (chronic obstructive pulmonary disease) (HCC)    CKD (chronic kidney disease), symptom management only, stage 5 (HCC)    Acute on chronic congestive heart failure (HCC)    Anemia    Type 2 diabetes mellitus with hyperglycemia (HCC)    Pneumonitis    1.  ESRD on HD- Initiated HD on 10/21 due to hyperkalemia and fluid overload, now likely ESRD.  HD TTS now. Plan for HD today to maintain regular schedule     2.  Hyponatremia- Na of 132. Modulate with HD     3.  Chest pain- ongoing ACS evaluation, first part of stress test today     4.  History of CAD     5.  History of COPD - On trilogy at night but left it at home     6.  Anemia / recent GI bleed / b12 deficiency-  s/p capsule endoscopy which showed few small non-bleeding intestinal AVMs and single small polyp. Keep b12. Keep folate. Check Fe.     7. 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 HTN- Continue home antihypertensives. Low dose lisinopril.      Thank you for the consult, we will continue to follow this patient.         I discussed the patients findings and my recommendations with patient      This document has been electronically signed by Jm VALENTIN  BRIDGETT Barrera on November 9, 2021 12:50 CST      For this patient encounter, I have reviewed the Nurse Practitioner's documentation, medical decision making, and treatment plan and personally spent time with the patient.

## 2021-11-09 NOTE — DISCHARGE PLACEMENT REQUEST
"Yamileth Slater (61 y.o. Female)             Date of Birth Social Security Number Address Home Phone MRN    1960  [address]  [place] [address] [phone] [id number]    G[employer]ist Marital Status             None        Admission Date Admission Type Admitting Provider Attending Provider Department, Room/Bed    11/8/21 Emergency Kit Brar MD Shahidi, Paul Cyrus, MD [address], 303/1    Discharge Date Discharge Disposition Discharge Destination                         Attending Provider: Kit Brar MD    Allergies: Adhesive Tape, Other    Isolation: Contact   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)   Code Status: CPR   Advance Care Planning Activity    Ht: 157.5 cm (62\")   Wt: 130 kg (286 lb 2.5 oz)    Admission Cmt: None   Principal Problem: Chest pain [R07.9] More...                 Active Insurance as of 11/8/2021     Primary Coverage     Payor Plan Insurance Group Employer/Plan Group    HUMANA MEDICARE REPLACEMENT HUMANA MEDICARE REPLACEMENT [id number]     Payor Plan Address Payor Plan Phone Number Payor Plan Fax Number Effective Dates    [address] [address] [phone]  7/1/2020 - None Entered    [facility] [address]       Subscriber Name Subscriber Birth Date Member ID       YAMILETH SLATER 1960 [id number]           Secondary Coverage     Payor Plan Insurance Group Employer/Plan Group    [place]      Payor Plan Address Payor Plan Phone Number Payor Plan Fax Number Effective Dates    [address] [phone]  6/28/2019 - None Entered    [facility] [id number]       Subscriber Name Subscriber Birth Date Member ID       YAMILETH SLATER 1960 [id number]                 Emergency Contacts      (Rel.) Home Phone Work Phone Mobile Phone    Huy Slater (Spouse) [phone] -- [phone]    Yamileth Chavez (Daughter) [phone] -- [phone]    Suzanne Rivera (Daughter) " [phone] -- [phone]            Insurance Information                HUMANA MEDICARE REPLACEMENT/HUMANA MEDICARE REPLACEMENT Phone: [phone]    Subscriber: Yamileth Slater Subscriber#: [id number]    Group#: [id number] Precert#: --        [place] Phone: [phone]    Subscriber: Yamileth Slater Subscriber#: [id number]    Group#: -- Precert#: --

## 2021-11-09 NOTE — PROGRESS NOTES
FAMILY MEDICINE RESIDENCY SERVICE  DAILY PROGRESS NOTE    NAME: Yamileth Slater  : 1960  MRN: [id number]      LOS: 0 days     PROVIDER OF SERVICE: Carline Coffey MD    Chief Complaint: Chest pain    Subjective:   Complains of intermittent chest pain.  Was resting comfortably this morning.     Review of Systems:   Review of Systems  Constitutional: Negative for chills and fever.   HENT: Negative for facial swelling and trouble swallowing.    Eyes: Negative for photophobia and visual disturbance.   Respiratory: Negative for apnea and chest tightness.    Cardiovascular: Negative for chest pain.   Gastrointestinal: Positive for Abdominal tenderness. Negative for abdominal distention, nausea and vomiting.   Genitourinary: Negative for pelvic pain.   Musculoskeletal: Negative for arthralgias and myalgias.   Neurological: Negative for seizures and speech difficulty.   Psychiatric/Behavioral: Negative for confusion and hallucinations  Objective:     Vital Signs  Temp:  [96.5 °F (35.8 °C)-98.1 °F (36.7 °C)] 98.1 °F (36.7 °C)  Heart Rate:  [58-86] 67  Resp:  [18-20] 18  BP: (139-172)/(60-74) 142/62  Flow (L/min):  [2-3] 3   Body mass index is 52.34 kg/m².    Physical Exam  Physical Exam  Constitutional:       Appearance: She is well-developed. She is obese.   HENT:      Nose: Nose normal.   Eyes:      Conjunctiva/sclera: Conjunctivae normal.      Pupils: Pupils are equal, round, and reactive to light.   Cardiovascular:      Rate and Rhythm: Normal rate and regular rhythm.      Pulses: Normal pulses.      Heart sounds: Normal heart sounds.   Pulmonary:      Effort: Pulmonary effort is normal. No respiratory distress.      Breath sounds: Normal breath sounds.   Abdominal:      General: There is no distension.      Palpations: Abdomen is soft.      Tenderness: There is abdominal tenderness.   Musculoskeletal:         General: Normal range of motion.      Cervical back: Normal range of motion and neck supple.       Right lower leg: Edema present.      Left lower leg: Edema present.   Skin:     General: Skin is warm and dry.   Neurological:      General: No focal deficit present.      Mental Status: She is alert. Mental status is at baseline.      Cranial Nerves: No cranial nerve deficit.   Psychiatric:         Mood and Affect: Mood normal.         Behavior: Behavior normal.         Scheduled Meds   aspirin, 81 mg, Oral, Daily  atorvastatin, 20 mg, Oral, Daily  bumetanide, 2 mg, Oral, Once per day on Sun Tue Thu Sat  cetirizine, 10 mg, Oral, Daily  clopidogrel, 75 mg, Oral, Daily  DULoxetine, 20 mg, Oral, Daily  ferrous sulfate, 324 mg, Oral, Daily With Breakfast  fluticasone, 2 spray, Each Nare, Daily  gabapentin, 400 mg, Oral, TID  heparin (porcine), 5,000 Units, Subcutaneous, Q12H  insulin aspart, 0-7 Units, Subcutaneous, TID AC  insulin aspart, 30 Units, Subcutaneous, TID With Meals  insulin detemir, 50 Units, Subcutaneous, Nightly  ipratropium-albuterol, 3 mL, Nebulization, 4x Daily - RT  isosorbide mononitrate, 30 mg, Oral, QAM  levothyroxine, 25 mcg, Oral, Daily  lidocaine, 1 patch, Transdermal, Q24H  lisinopril, 10 mg, Oral, Daily  metoprolol tartrate, 50 mg, Oral, Q12H  montelukast, 10 mg, Oral, Nightly  nystatin, , Topical, TID  oxybutynin XL, 5 mg, Oral, Daily  pantoprazole, 40 mg, Oral, Nightly  sennosides-docusate, 2 tablet, Oral, BID  sodium bicarbonate, 650 mg, Oral, BID  sodium chloride, 10 mL, Intravenous, Q12H       PRN Meds   •  acetaminophen  •  albuterol  •  cyclobenzaprine  •  dextrose  •  dextrose  •  glucagon (human recombinant)  •  influenza vaccine  •  melatonin  •  Morphine **AND** naloxone  •  nitroglycerin  •  ondansetron  •  ondansetron ODT  •  promethazine  •  sodium chloride  •  sodium chloride      Diagnostic Data    Results from last 7 days   Lab Units 11/09/21  0608   WBC 10*3/mm3 10.01   HEMOGLOBIN g/dL 8.4*   HEMATOCRIT % 26.3*   PLATELETS 10*3/mm3 265   GLUCOSE mg/dL 215*   CREATININE  mg/dL 3.16*   BUN mg/dL 51*   SODIUM mmol/L 132*   POTASSIUM mmol/L 4.2   AST (SGOT) U/L 13   ALT (SGPT) U/L 9   ALK PHOS U/L 127*   BILIRUBIN mg/dL 0.2   ANION GAP mmol/L 12.0       XR Chest 1 View    Result Date: 11/8/2021  Interval development of right lower lobe infiltrative changes indicating pneumonitis. Electronically signed by:  Hugo Russo MD  11/8/2021 3:32 PM CST Workstation: [id number]        I reviewed the patient's new clinical results.    Assessment/Plan:     Active Hospital Problems    Diagnosis  POA   • **Chest pain [R07.9]  Yes     Priority: High     -S/P CABG x 3 (Primary), Bilateral carotid artery stenosis w/ 2 stents on left side,  PAD (peripheral artery disease) with stent in right upper leg, Morbid obesity   -Continue aspirin, Plavix, atorvastatin, lisinopril,  isosorbide mononitrate 60 mg daily     -EKG: No ST segment changes.   -roponin negative  -NPO after midnight  -Stress test in the AM       • Anemia [D64.9]  Unknown     Priority: High     -Anemia of chronic disease vs GI bleed  -No active bleeding on recent EGD/Colonoscopy  -Recent capsule endoscopy which showed few small nonbleeding intestinal AVMs and single small intestinal polyp which GI planned to follow outpatient  -Continue to monitor H/H  -Will transfuse if Hgb <8  -Continue home ferrous sulfate. May be a candidate for epogen and IV iron  -Follow stool occult test     • CKD (chronic kidney disease), symptom management only, stage 5 (HCC) [N18.5]  Yes     Priority: High     Results from last 7 days   Lab Units 11/09/21  0608 11/08/21  1450   CREATININE mg/dL 3.16* 3.27*     -Completes outpatient HD on TTS  -Nephrology on board. Will require HD in house     • Type 2 diabetes mellitus with hyperglycemia (HCC) [E11.65]  Unknown     -Continue 20 units of levemir in the morning , 30 units of novolog with meals, and 50 units of levemir at night  -SSI for additional coverage     • Pneumonitis [J18.9]  Unknown   • Acute on chronic  congestive heart failure (HCC) [I50.9]  Yes     - Continue Imdur, metoprolol, lisinopril      • COPD (chronic obstructive pulmonary disease) (HCC) [J44.9]  Yes     -O2 supplementation  -Home inhalers as needed  -DuoNebs  -CPAP at night          DVT prophylaxis: Heparin  Code status is   Code Status and Medical Interventions:   Ordered at: 11/08/21 1734     Code Status (Patient has no pulse and is not breathing):    CPR (Attempt to Resuscitate)     Medical Interventions (Patient has pulse or is breathing):    Full Support       Plan for disposition:Home in 0-1 days      Time: 15 minutes           PGY-3  [facility]  This document has been electronically signed by Carline Coffey MD on November 9, 2021 07:51 CST

## 2021-11-09 NOTE — PLAN OF CARE
Goal Outcome Evaluation:  Plan of Care Reviewed With: patient        Progress: no change   Pt oriented to [address] after arrival just after supper time. Pt reports mid-sternal chest pain of 3-4. Vital signs stable. Pt will be NPO after midnight for possible stress test on Tuesday.

## 2021-11-09 NOTE — PLAN OF CARE
Goal Outcome Evaluation:  Plan of Care Reviewed With: patient        Progress: no change   Pt denies having chest pain today. She went for the first part of stress test today and will be NPO after midnight for the second part on Wednesday. Pt received dialysis in the room this afternoon.

## 2021-11-10 ENCOUNTER — APPOINTMENT (OUTPATIENT)
Dept: NUCLEAR MEDICINE | Facility: HOSPITAL | Age: 61
End: 2021-11-10

## 2021-11-10 ENCOUNTER — APPOINTMENT (OUTPATIENT)
Dept: CARDIOLOGY | Facility: HOSPITAL | Age: 61
End: 2021-11-10

## 2021-11-10 LAB
ALBUMIN SERPL-MCNC: 3.4 G/DL (ref 3.5–5.2)
ALBUMIN/GLOB SERPL: 0.8 G/DL
ALP SERPL-CCNC: 151 U/L (ref 39–117)
ALT SERPL W P-5'-P-CCNC: 10 U/L (ref 1–33)
ANION GAP SERPL CALCULATED.3IONS-SCNC: 14 MMOL/L (ref 5–15)
AST SERPL-CCNC: 11 U/L (ref 1–32)
BASOPHILS # BLD AUTO: 0.09 10*3/MM3 (ref 0–0.2)
BASOPHILS NFR BLD AUTO: 1.1 % (ref 0–1.5)
BILIRUB SERPL-MCNC: 0.2 MG/DL (ref 0–1.2)
BUN SERPL-MCNC: 47 MG/DL (ref 8–23)
BUN/CREAT SERPL: 13.5 (ref 7–25)
CALCIUM SPEC-SCNC: 8.9 MG/DL (ref 8.6–10.5)
CHLORIDE SERPL-SCNC: 90 MMOL/L (ref 98–107)
CO2 SERPL-SCNC: 23 MMOL/L (ref 22–29)
CREAT SERPL-MCNC: 3.49 MG/DL (ref 0.57–1)
DEPRECATED RDW RBC AUTO: 45.5 FL (ref 37–54)
EOSINOPHIL # BLD AUTO: 0.32 10*3/MM3 (ref 0–0.4)
EOSINOPHIL NFR BLD AUTO: 3.8 % (ref 0.3–6.2)
ERYTHROCYTE [DISTWIDTH] IN BLOOD BY AUTOMATED COUNT: 15.3 % (ref 12.3–15.4)
GFR SERPL CREATININE-BSD FRML MDRD: 13 ML/MIN/1.73
GFR SERPL CREATININE-BSD FRML MDRD: ABNORMAL ML/MIN/{1.73_M2}
GLOBULIN UR ELPH-MCNC: 4.4 GM/DL
GLUCOSE BLDC GLUCOMTR-MCNC: 226 MG/DL (ref 70–130)
GLUCOSE BLDC GLUCOMTR-MCNC: 290 MG/DL (ref 70–130)
GLUCOSE BLDC GLUCOMTR-MCNC: 304 MG/DL (ref 70–130)
GLUCOSE BLDC GLUCOMTR-MCNC: 343 MG/DL (ref 70–130)
GLUCOSE SERPL-MCNC: 293 MG/DL (ref 65–99)
HCT VFR BLD AUTO: 26.6 % (ref 34–46.6)
HGB BLD-MCNC: 8.5 G/DL (ref 12–15.9)
IMM GRANULOCYTES # BLD AUTO: 0.09 10*3/MM3 (ref 0–0.05)
IMM GRANULOCYTES NFR BLD AUTO: 1.1 % (ref 0–0.5)
LYMPHOCYTES # BLD AUTO: 1.91 10*3/MM3 (ref 0.7–3.1)
LYMPHOCYTES NFR BLD AUTO: 22.7 % (ref 19.6–45.3)
MCH RBC QN AUTO: 27 PG (ref 26.6–33)
MCHC RBC AUTO-ENTMCNC: 32 G/DL (ref 31.5–35.7)
MCV RBC AUTO: 84.4 FL (ref 79–97)
MONOCYTES # BLD AUTO: 0.68 10*3/MM3 (ref 0.1–0.9)
MONOCYTES NFR BLD AUTO: 8.1 % (ref 5–12)
NEUTROPHILS NFR BLD AUTO: 5.32 10*3/MM3 (ref 1.7–7)
NEUTROPHILS NFR BLD AUTO: 63.2 % (ref 42.7–76)
NRBC BLD AUTO-RTO: 0 /100 WBC (ref 0–0.2)
PLATELET # BLD AUTO: 288 10*3/MM3 (ref 140–450)
PMV BLD AUTO: 8.9 FL (ref 6–12)
POTASSIUM SERPL-SCNC: 4.5 MMOL/L (ref 3.5–5.2)
PROT SERPL-MCNC: 7.8 G/DL (ref 6–8.5)
QT INTERVAL: 496 MS
QTC INTERVAL: 550 MS
RBC # BLD AUTO: 3.15 10*6/MM3 (ref 3.77–5.28)
SARS-COV-2 N GENE RESP QL NAA+PROBE: NOT DETECTED
SODIUM SERPL-SCNC: 127 MMOL/L (ref 136–145)
WBC # BLD AUTO: 8.41 10*3/MM3 (ref 3.4–10.8)

## 2021-11-10 PROCEDURE — 63710000001 INSULIN ASPART PER 5 UNITS: Performed by: STUDENT IN AN ORGANIZED HEALTH CARE EDUCATION/TRAINING PROGRAM

## 2021-11-10 PROCEDURE — 94760 N-INVAS EAR/PLS OXIMETRY 1: CPT

## 2021-11-10 PROCEDURE — 94799 UNLISTED PULMONARY SVC/PX: CPT

## 2021-11-10 PROCEDURE — 82962 GLUCOSE BLOOD TEST: CPT

## 2021-11-10 PROCEDURE — 80053 COMPREHEN METABOLIC PANEL: CPT | Performed by: STUDENT IN AN ORGANIZED HEALTH CARE EDUCATION/TRAINING PROGRAM

## 2021-11-10 PROCEDURE — 87635 SARS-COV-2 COVID-19 AMP PRB: CPT | Performed by: FAMILY MEDICINE

## 2021-11-10 PROCEDURE — 25010000002 HEPARIN (PORCINE) PER 1000 UNITS: Performed by: STUDENT IN AN ORGANIZED HEALTH CARE EDUCATION/TRAINING PROGRAM

## 2021-11-10 PROCEDURE — A9500 TC99M SESTAMIBI: HCPCS | Performed by: FAMILY MEDICINE

## 2021-11-10 PROCEDURE — 0 TECHNETIUM SESTAMIBI: Performed by: FAMILY MEDICINE

## 2021-11-10 PROCEDURE — G0378 HOSPITAL OBSERVATION PER HR: HCPCS

## 2021-11-10 PROCEDURE — 85025 COMPLETE CBC W/AUTO DIFF WBC: CPT | Performed by: STUDENT IN AN ORGANIZED HEALTH CARE EDUCATION/TRAINING PROGRAM

## 2021-11-10 PROCEDURE — 25010000002 REGADENOSON 0.4 MG/5ML SOLUTION: Performed by: FAMILY MEDICINE

## 2021-11-10 PROCEDURE — 63710000001 INSULIN DETEMIR PER 5 UNITS: Performed by: STUDENT IN AN ORGANIZED HEALTH CARE EDUCATION/TRAINING PROGRAM

## 2021-11-10 RX ORDER — SODIUM CHLORIDE 0.9 % (FLUSH) 0.9 %
10 SYRINGE (ML) INJECTION ONCE
Status: COMPLETED | OUTPATIENT
Start: 2021-11-10 | End: 2021-11-10

## 2021-11-10 RX ADMIN — TECHNETIUM TC 99M SESTAMIBI 1 DOSE: 1 INJECTION INTRAVENOUS at 10:14

## 2021-11-10 RX ADMIN — IPRATROPIUM BROMIDE AND ALBUTEROL SULFATE 3 ML: 2.5; .5 SOLUTION RESPIRATORY (INHALATION) at 16:00

## 2021-11-10 RX ADMIN — GABAPENTIN 400 MG: 400 CAPSULE ORAL at 16:34

## 2021-11-10 RX ADMIN — HEPARIN SODIUM 5000 UNITS: 5000 INJECTION INTRAVENOUS; SUBCUTANEOUS at 20:17

## 2021-11-10 RX ADMIN — LIDOCAINE 1 PATCH: 700 PATCH TOPICAL at 12:14

## 2021-11-10 RX ADMIN — LISINOPRIL 10 MG: 10 TABLET ORAL at 09:30

## 2021-11-10 RX ADMIN — MONTELUKAST 10 MG: 10 TABLET, FILM COATED ORAL at 20:17

## 2021-11-10 RX ADMIN — ISOSORBIDE MONONITRATE 30 MG: 30 TABLET, EXTENDED RELEASE ORAL at 06:12

## 2021-11-10 RX ADMIN — LEVOTHYROXINE SODIUM 25 MCG: 25 TABLET ORAL at 09:32

## 2021-11-10 RX ADMIN — DULOXETINE HYDROCHLORIDE 20 MG: 20 CAPSULE, DELAYED RELEASE ORAL at 09:30

## 2021-11-10 RX ADMIN — INSULIN ASPART 3 UNITS: 100 INJECTION, SOLUTION INTRAVENOUS; SUBCUTANEOUS at 16:34

## 2021-11-10 RX ADMIN — METOPROLOL TARTRATE 50 MG: 50 TABLET, FILM COATED ORAL at 20:17

## 2021-11-10 RX ADMIN — ACETAMINOPHEN 650 MG: 325 TABLET, FILM COATED ORAL at 22:14

## 2021-11-10 RX ADMIN — REGADENOSON 0.4 MG: 0.08 INJECTION, SOLUTION INTRAVENOUS at 10:13

## 2021-11-10 RX ADMIN — HEPARIN SODIUM 5000 UNITS: 5000 INJECTION INTRAVENOUS; SUBCUTANEOUS at 09:38

## 2021-11-10 RX ADMIN — SODIUM CHLORIDE, PRESERVATIVE FREE 10 ML: 5 INJECTION INTRAVENOUS at 10:13

## 2021-11-10 RX ADMIN — GABAPENTIN 400 MG: 400 CAPSULE ORAL at 09:30

## 2021-11-10 RX ADMIN — ASPIRIN 81 MG: 81 TABLET, FILM COATED ORAL at 09:31

## 2021-11-10 RX ADMIN — INSULIN DETEMIR 50 UNITS: 100 INJECTION, SOLUTION SUBCUTANEOUS at 20:17

## 2021-11-10 RX ADMIN — CETIRIZINE HYDROCHLORIDE 10 MG: 10 TABLET, FILM COATED ORAL at 09:32

## 2021-11-10 RX ADMIN — PANTOPRAZOLE SODIUM 40 MG: 40 TABLET, DELAYED RELEASE ORAL at 20:17

## 2021-11-10 RX ADMIN — FERROUS SULFATE TAB EC 324 MG (65 MG FE EQUIVALENT) 324 MG: 324 (65 FE) TABLET DELAYED RESPONSE at 09:32

## 2021-11-10 RX ADMIN — METOPROLOL TARTRATE 50 MG: 50 TABLET, FILM COATED ORAL at 09:31

## 2021-11-10 RX ADMIN — CLOPIDOGREL BISULFATE 75 MG: 75 TABLET ORAL at 09:32

## 2021-11-10 RX ADMIN — INSULIN ASPART 4 UNITS: 100 INJECTION, SOLUTION INTRAVENOUS; SUBCUTANEOUS at 12:15

## 2021-11-10 RX ADMIN — SODIUM CHLORIDE, PRESERVATIVE FREE 10 ML: 5 INJECTION INTRAVENOUS at 20:18

## 2021-11-10 RX ADMIN — SODIUM CHLORIDE, PRESERVATIVE FREE 10 ML: 5 INJECTION INTRAVENOUS at 12:10

## 2021-11-10 RX ADMIN — NYSTATIN: 100000 POWDER TOPICAL at 20:17

## 2021-11-10 RX ADMIN — IPRATROPIUM BROMIDE AND ALBUTEROL SULFATE 3 ML: 2.5; .5 SOLUTION RESPIRATORY (INHALATION) at 06:54

## 2021-11-10 RX ADMIN — INSULIN ASPART 30 UNITS: 100 INJECTION, SOLUTION INTRAVENOUS; SUBCUTANEOUS at 12:15

## 2021-11-10 RX ADMIN — OXYBUTYNIN CHLORIDE 5 MG: 5 TABLET, EXTENDED RELEASE ORAL at 09:30

## 2021-11-10 RX ADMIN — GABAPENTIN 400 MG: 400 CAPSULE ORAL at 20:17

## 2021-11-10 RX ADMIN — INSULIN ASPART 30 UNITS: 100 INJECTION, SOLUTION INTRAVENOUS; SUBCUTANEOUS at 17:10

## 2021-11-10 RX ADMIN — ATORVASTATIN CALCIUM 20 MG: 20 TABLET, FILM COATED ORAL at 09:31

## 2021-11-10 RX ADMIN — NYSTATIN: 100000 POWDER TOPICAL at 16:34

## 2021-11-10 RX ADMIN — IPRATROPIUM BROMIDE AND ALBUTEROL SULFATE 3 ML: 2.5; .5 SOLUTION RESPIRATORY (INHALATION) at 19:50

## 2021-11-10 NOTE — PROGRESS NOTES
FAMILY MEDICINE RESIDENCY SERVICE  DAILY PROGRESS NOTE    NAME: Yamileth Slater  : 1960  MRN: [id number]      LOS: 0 days     PROVIDER OF SERVICE: Carline Coffey MD    Chief Complaint: Chest pain    Subjective:     Resting comfortably.  Scheduled for part two of a stress test today.  No chest pain today.  Concerned about whether dye will be required for stress test and whether she needs another dialysis session.  Will await nephrology recommendations    Review of Systems:   Review of Systems   Constitutional: Negative for chills and fever.   HENT: Negative for facial swelling and trouble swallowing.    Eyes: Negative for photophobia and visual disturbance.   Respiratory: Negative for apnea, chest tightness and shortness of breath.    Cardiovascular: Negative for chest pain.   Gastrointestinal: Negative for abdominal distention, diarrhea, nausea and vomiting.   Genitourinary: Negative for pelvic pain.   Musculoskeletal: Negative for arthralgias and myalgias.   Neurological: Negative for seizures and speech difficulty.   Psychiatric/Behavioral: Negative for confusion and hallucinations.       Objective:     Vital Signs  Temp:  [97.1 °F (36.2 °C)-98.2 °F (36.8 °C)] 97.1 °F (36.2 °C)  Heart Rate:  [64-80] 65  Resp:  [18-20] 20  BP: (136-162)/(56-78) 139/56  Flow (L/min):  [3] 3   Body mass index is 52.16 kg/m².    Physical Exam  Physical Exam  Constitutional:       Appearance: She is well-developed. She is obese.   HENT:      Head: Normocephalic and atraumatic.      Nose: Nose normal.   Eyes:      Conjunctiva/sclera: Conjunctivae normal.      Pupils: Pupils are equal, round, and reactive to light.   Cardiovascular:      Rate and Rhythm: Normal rate and regular rhythm.      Pulses: Normal pulses.      Heart sounds: Normal heart sounds.   Pulmonary:      Effort: Pulmonary effort is normal. No respiratory distress.      Breath sounds: Normal breath sounds.   Abdominal:      General: Abdomen is flat.  Bowel sounds are normal. There is no distension.      Palpations: Abdomen is soft.   Musculoskeletal:         General: Normal range of motion.      Cervical back: Normal range of motion and neck supple.   Skin:     General: Skin is warm and dry.   Neurological:      General: No focal deficit present.      Mental Status: She is alert and oriented to person, place, and time. Mental status is at baseline.      Cranial Nerves: No cranial nerve deficit.   Psychiatric:         Mood and Affect: Mood normal.         Behavior: Behavior normal.         Scheduled Meds   aspirin, 81 mg, Oral, Daily  atorvastatin, 20 mg, Oral, Daily  bumetanide, 2 mg, Oral, Once per day on Sun Tue Thu Sat  cetirizine, 10 mg, Oral, Daily  clopidogrel, 75 mg, Oral, Daily  DULoxetine, 20 mg, Oral, Daily  ferrous sulfate, 324 mg, Oral, Daily With Breakfast  fluticasone, 2 spray, Each Nare, Daily  gabapentin, 400 mg, Oral, TID  heparin (porcine), 5,000 Units, Subcutaneous, Q12H  insulin aspart, 0-7 Units, Subcutaneous, TID AC  insulin aspart, 30 Units, Subcutaneous, TID With Meals  insulin detemir, 50 Units, Subcutaneous, Nightly  ipratropium-albuterol, 3 mL, Nebulization, 4x Daily - RT  isosorbide mononitrate, 30 mg, Oral, QAM  levothyroxine, 25 mcg, Oral, Daily  lidocaine, 1 patch, Transdermal, Q24H  lisinopril, 10 mg, Oral, Daily  metoprolol tartrate, 50 mg, Oral, Q12H  montelukast, 10 mg, Oral, Nightly  nystatin, , Topical, TID  oxybutynin XL, 5 mg, Oral, Daily  pantoprazole, 40 mg, Oral, Nightly  sennosides-docusate, 2 tablet, Oral, BID  sodium chloride, 10 mL, Intravenous, Q12H       PRN Meds   •  acetaminophen  •  albumin human  •  albuterol  •  cyclobenzaprine  •  dextrose  •  dextrose  •  glucagon (human recombinant)  •  heparin (porcine)  •  influenza vaccine  •  melatonin  •  Morphine **AND** naloxone  •  nitroglycerin  •  ondansetron  •  ondansetron ODT  •  promethazine  •  sodium chloride  •  sodium chloride      Diagnostic Data     Results from last 7 days   Lab Units 11/10/21  0538   WBC 10*3/mm3 8.41   HEMOGLOBIN g/dL 8.5*   HEMATOCRIT % 26.6*   PLATELETS 10*3/mm3 288   GLUCOSE mg/dL 293*   CREATININE mg/dL 3.49*   BUN mg/dL 47*   SODIUM mmol/L 127*   POTASSIUM mmol/L 4.5   AST (SGOT) U/L 11   ALT (SGPT) U/L 10   ALK PHOS U/L 151*   BILIRUBIN mg/dL 0.2   ANION GAP mmol/L 14.0       XR Chest 1 View    Result Date: 11/8/2021  Interval development of right lower lobe infiltrative changes indicating pneumonitis. Electronically signed by:  Hugo Russo MD  11/8/2021 3:32 PM CST Workstation: [id number]        I reviewed the patient's new clinical results.    Assessment/Plan:     Active Hospital Problems    Diagnosis  POA   • **Chest pain [R07.9]  Yes     Priority: High     -S/P CABG x 3 (Primary), Bilateral carotid artery stenosis w/ 2 stents on left side,  PAD (peripheral artery disease) with stent in right upper leg, Morbid obesity   -Continue aspirin, Plavix, atorvastatin, lisinopril,  isosorbide mononitrate 60 mg daily     -EKG: No ST segment changes.   -roponin negative  -NPO after midnight  -Part 2 Stress test today     • Anemia [D64.9]  Unknown     Priority: High     -Anemia of chronic disease vs GI bleed  -No active bleeding on recent EGD/Colonoscopy  -Recent capsule endoscopy which showed few small nonbleeding intestinal AVMs and single small intestinal polyp which GI planned to follow outpatient  -Continue to monitor H/H  -Will transfuse if Hgb <8  -Continue home ferrous sulfate. May be a candidate for epogen and IV iron  -Follow stool occult test     • CKD (chronic kidney disease), symptom management only, stage 5 (HCC) [N18.5]  Yes     Priority: High     Results from last 7 days   Lab Units 11/10/21  0538 11/09/21  0608 11/08/21  1450   CREATININE mg/dL 3.49* 3.16* 3.27*     -Completes outpatient HD on TTS  -Nephrology on board. Will require HD in house     • Type 2 diabetes mellitus with hyperglycemia (HCC) [E11.65]  Unknown      -Continue 20 units of levemir in the morning , 30 units of novolog with meals, and 50 units of levemir at night  -SSI for additional coverage     • Pneumonitis [J18.9]  Unknown   • Acute on chronic congestive heart failure (HCC) [I50.9]  Yes     - Continue Imdur, metoprolol, lisinopril      • COPD (chronic obstructive pulmonary disease) (HCC) [J44.9]  Yes     -O2 supplementation  -Home inhalers as needed  -DuoNebs  -CPAP at night          DVT prophylaxis: Heparin  Code status is   Code Status and Medical Interventions:   Ordered at: 11/08/21 1734     Code Status (Patient has no pulse and is not breathing):    CPR (Attempt to Resuscitate)     Medical Interventions (Patient has pulse or is breathing):    Full Support       Plan for disposition:Home in 0-1 days      Time: 15 minutes           PGY-3  [facility]  This document has been electronically signed by Carline Coffey MD on November 10, 2021 08:56 CST

## 2021-11-10 NOTE — PLAN OF CARE
Goal Outcome Evaluation:  Plan of Care Reviewed With: patient        Progress: no change  Outcome Summary: no c/o CP, 2nd part of stress test will be today, received dialysis yesterday

## 2021-11-10 NOTE — PROGRESS NOTES
"[facility]  [address] [place] [address]. [address]  T - [phone]  F - [phone]     Progress Note          PATIENT  DEMOGRAPHICS   PATIENT NAME: Yamileth Slater                      PHYSICIAN: Ace Lauren MD  : 1960  MRN: [id number]   LOS: 0 days    Patient Care Team:  Rianna Macias MD as PCP - General (Family Medicine)  Subjective   SUBJECTIVE   Just had second part of stress test  No chest pain          Objective   OBJECTIVE   Vital Signs  Temp:  [97.1 °F (36.2 °C)-98.2 °F (36.8 °C)] 97.1 °F (36.2 °C)  Heart Rate:  [64-80] 65  Resp:  [18-20] 20  BP: (138-162)/(56-78) 139/56    Flowsheet Rows      First Filed Value   Admission Height 157.5 cm (62\") Documented at 2021 1434   Admission Weight 128 kg (282 lb) Documented at 2021 1434           I/O last 3 completed shifts:  In: 480 [P.O.:480]  Out: 5200 [Urine:1700; Other:3500]    PHYSICAL EXAM    Physical Exam  Vitals reviewed.   Constitutional:       Appearance: Normal appearance.   HENT:      Nose: Nose normal.   Cardiovascular:      Rate and Rhythm: Normal rate and regular rhythm.      Pulses: Normal pulses.      Heart sounds: Normal heart sounds. No murmur heard.      Pulmonary:      Effort: Pulmonary effort is normal.      Breath sounds: No wheezing or rales.   Abdominal:      General: Abdomen is flat. There is no distension.      Palpations: Abdomen is soft.      Tenderness: There is no abdominal tenderness.   Musculoskeletal:         General: No swelling.   Skin:     Coloration: Skin is not jaundiced.      Findings: No erythema.   Neurological:      General: No focal deficit present.      Mental Status: She is alert. She is disoriented.         RESULTS   Results Review:    Results from last 7 days   Lab Units 11/10/21  0538 21  0608 21  1450   SODIUM mmol/L 127* 132* 131*   POTASSIUM mmol/L 4.5 4.2 4.1   CHLORIDE mmol/L 90* 95* 93*   CO2 mmol/L 23.0 25.0 25.0   BUN mg/dL 47* 51* 48* "   CREATININE mg/dL 3.49* 3.16* 3.27*   CALCIUM mg/dL 8.9 9.1 8.7   BILIRUBIN mg/dL 0.2 0.2 0.2   ALK PHOS U/L 151* 127* 134*   ALT (SGPT) U/L 10 9 8   AST (SGOT) U/L 11 13 11   GLUCOSE mg/dL 293* 215* 441*       Estimated Creatinine Clearance: 21.6 mL/min (A) (by C-G formula based on SCr of 3.49 mg/dL (H)).                Results from last 7 days   Lab Units 11/10/21  0538 11/09/21  0608 11/08/21  1450   WBC 10*3/mm3 8.41 10.01 8.14   HEMOGLOBIN g/dL 8.5* 8.4* 7.9*   PLATELETS 10*3/mm3 288 265 252               Imaging Results (Last 24 Hours)     ** No results found for the last 24 hours. **           MEDICATIONS    aspirin, 81 mg, Oral, Daily  atorvastatin, 20 mg, Oral, Daily  bumetanide, 2 mg, Oral, Once per day on Sun Tue Thu Sat  cetirizine, 10 mg, Oral, Daily  clopidogrel, 75 mg, Oral, Daily  DULoxetine, 20 mg, Oral, Daily  ferrous sulfate, 324 mg, Oral, Daily With Breakfast  fluticasone, 2 spray, Each Nare, Daily  gabapentin, 400 mg, Oral, TID  heparin (porcine), 5,000 Units, Subcutaneous, Q12H  insulin aspart, 0-7 Units, Subcutaneous, TID AC  insulin aspart, 30 Units, Subcutaneous, TID With Meals  insulin detemir, 50 Units, Subcutaneous, Nightly  ipratropium-albuterol, 3 mL, Nebulization, 4x Daily - RT  isosorbide mononitrate, 30 mg, Oral, QAM  levothyroxine, 25 mcg, Oral, Daily  lidocaine, 1 patch, Transdermal, Q24H  lisinopril, 10 mg, Oral, Daily  metoprolol tartrate, 50 mg, Oral, Q12H  montelukast, 10 mg, Oral, Nightly  nystatin, , Topical, TID  oxybutynin XL, 5 mg, Oral, Daily  pantoprazole, 40 mg, Oral, Nightly  sennosides-docusate, 2 tablet, Oral, BID  sodium chloride, 10 mL, Intravenous, Q12H           Assessment/Plan   ASSESSMENT / PLAN      Chest pain    COPD (chronic obstructive pulmonary disease) (HCC)    CKD (chronic kidney disease), symptom management only, stage 5 (HCC)    Acute on chronic congestive heart failure (HCC)    Anemia    Type 2 diabetes mellitus with hyperglycemia (HCC)     Pneumonitis    1.  ESRD on HD- Initiated HD on 10/21 due to hyperkalemia and fluid overload, now likely ESRD.  HD TTS now. Plan for HD tomorrow to maintain regular schedule     2.  Hyponatremia- Na <130. Modulate with HD     3.  Chest pain- ongoing ACS evaluation, first part of stress test today     4.  History of CAD     5.  History of COPD - On trilogy at night but left it at home     6.  Anemia / recent GI bleed / b12 deficiency-  s/p capsule endoscopy which showed few small non-bleeding intestinal AVMs and single small polyp. Keep b12. Keep folate. Check Fe.     7. 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 HTN- Continue home antihypertensives. Low dose lisinopril.                This document has been electronically signed by Ace Lauren MD on November 10, 2021 12:00 CST

## 2021-11-10 NOTE — SIGNIFICANT NOTE
11/10/21 0935   OTHER   Discipline occupational therapist; physical therapist   Rehab Time/Intention   Session Not Performed patient unavailable for evaluation   Recommendation   PT - Next Appointment 11/11/21   Recommendation   OT - Next Appointment 11/11/21    OT/PT evals attempted; Pt with transport and going off floor for stress test. OT will f/u as time permits.

## 2021-11-11 LAB
ALBUMIN SERPL-MCNC: 3.3 G/DL (ref 3.5–5.2)
ALBUMIN/GLOB SERPL: 0.8 G/DL
ALP SERPL-CCNC: 177 U/L (ref 39–117)
ALT SERPL W P-5'-P-CCNC: 9 U/L (ref 1–33)
ANION GAP SERPL CALCULATED.3IONS-SCNC: 16 MMOL/L (ref 5–15)
AST SERPL-CCNC: 9 U/L (ref 1–32)
BASOPHILS # BLD AUTO: 0.09 10*3/MM3 (ref 0–0.2)
BASOPHILS NFR BLD AUTO: 1.1 % (ref 0–1.5)
BILIRUB SERPL-MCNC: <0.2 MG/DL (ref 0–1.2)
BUN SERPL-MCNC: 56 MG/DL (ref 8–23)
BUN/CREAT SERPL: 13.7 (ref 7–25)
CALCIUM SPEC-SCNC: 8.8 MG/DL (ref 8.6–10.5)
CHLORIDE SERPL-SCNC: 88 MMOL/L (ref 98–107)
CO2 SERPL-SCNC: 21 MMOL/L (ref 22–29)
CREAT SERPL-MCNC: 4.08 MG/DL (ref 0.57–1)
DEPRECATED RDW RBC AUTO: 45.6 FL (ref 37–54)
EOSINOPHIL # BLD AUTO: 0.32 10*3/MM3 (ref 0–0.4)
EOSINOPHIL NFR BLD AUTO: 3.9 % (ref 0.3–6.2)
ERYTHROCYTE [DISTWIDTH] IN BLOOD BY AUTOMATED COUNT: 15.3 % (ref 12.3–15.4)
GFR SERPL CREATININE-BSD FRML MDRD: 11 ML/MIN/1.73
GFR SERPL CREATININE-BSD FRML MDRD: ABNORMAL ML/MIN/{1.73_M2}
GLOBULIN UR ELPH-MCNC: 4.3 GM/DL
GLUCOSE BLDC GLUCOMTR-MCNC: 211 MG/DL (ref 70–130)
GLUCOSE BLDC GLUCOMTR-MCNC: 292 MG/DL (ref 70–130)
GLUCOSE BLDC GLUCOMTR-MCNC: 385 MG/DL (ref 70–130)
GLUCOSE BLDC GLUCOMTR-MCNC: 432 MG/DL (ref 70–130)
GLUCOSE BLDC GLUCOMTR-MCNC: 448 MG/DL (ref 70–130)
GLUCOSE SERPL-MCNC: 437 MG/DL (ref 65–99)
HCT VFR BLD AUTO: 25 % (ref 34–46.6)
HGB BLD-MCNC: 8.2 G/DL (ref 12–15.9)
IMM GRANULOCYTES # BLD AUTO: 0.07 10*3/MM3 (ref 0–0.05)
IMM GRANULOCYTES NFR BLD AUTO: 0.9 % (ref 0–0.5)
LYMPHOCYTES # BLD AUTO: 1.73 10*3/MM3 (ref 0.7–3.1)
LYMPHOCYTES NFR BLD AUTO: 21.2 % (ref 19.6–45.3)
MCH RBC QN AUTO: 27.3 PG (ref 26.6–33)
MCHC RBC AUTO-ENTMCNC: 32.8 G/DL (ref 31.5–35.7)
MCV RBC AUTO: 83.3 FL (ref 79–97)
MONOCYTES # BLD AUTO: 0.68 10*3/MM3 (ref 0.1–0.9)
MONOCYTES NFR BLD AUTO: 8.3 % (ref 5–12)
NEUTROPHILS NFR BLD AUTO: 5.26 10*3/MM3 (ref 1.7–7)
NEUTROPHILS NFR BLD AUTO: 64.6 % (ref 42.7–76)
NRBC BLD AUTO-RTO: 0 /100 WBC (ref 0–0.2)
PLATELET # BLD AUTO: 267 10*3/MM3 (ref 140–450)
PMV BLD AUTO: 8.7 FL (ref 6–12)
POTASSIUM SERPL-SCNC: 4.6 MMOL/L (ref 3.5–5.2)
PROT SERPL-MCNC: 7.6 G/DL (ref 6–8.5)
RBC # BLD AUTO: 3 10*6/MM3 (ref 3.77–5.28)
SODIUM SERPL-SCNC: 125 MMOL/L (ref 136–145)
WBC # BLD AUTO: 8.15 10*3/MM3 (ref 3.4–10.8)

## 2021-11-11 PROCEDURE — 25010000002 ALBUMIN HUMAN 25% PER 50 ML: Performed by: INTERNAL MEDICINE

## 2021-11-11 PROCEDURE — 85025 COMPLETE CBC W/AUTO DIFF WBC: CPT | Performed by: STUDENT IN AN ORGANIZED HEALTH CARE EDUCATION/TRAINING PROGRAM

## 2021-11-11 PROCEDURE — 25010000002 MORPHINE PER 10 MG: Performed by: STUDENT IN AN ORGANIZED HEALTH CARE EDUCATION/TRAINING PROGRAM

## 2021-11-11 PROCEDURE — G0257 UNSCHED DIALYSIS ESRD PT HOS: HCPCS

## 2021-11-11 PROCEDURE — 94799 UNLISTED PULMONARY SVC/PX: CPT

## 2021-11-11 PROCEDURE — 63710000001 INSULIN ASPART PER 5 UNITS: Performed by: STUDENT IN AN ORGANIZED HEALTH CARE EDUCATION/TRAINING PROGRAM

## 2021-11-11 PROCEDURE — 25010000002 HEPARIN (PORCINE) PER 1000 UNITS: Performed by: STUDENT IN AN ORGANIZED HEALTH CARE EDUCATION/TRAINING PROGRAM

## 2021-11-11 PROCEDURE — 63710000001 INSULIN DETEMIR PER 5 UNITS: Performed by: STUDENT IN AN ORGANIZED HEALTH CARE EDUCATION/TRAINING PROGRAM

## 2021-11-11 PROCEDURE — P9047 ALBUMIN (HUMAN), 25%, 50ML: HCPCS | Performed by: INTERNAL MEDICINE

## 2021-11-11 PROCEDURE — 82962 GLUCOSE BLOOD TEST: CPT

## 2021-11-11 PROCEDURE — 99214 OFFICE O/P EST MOD 30 MIN: CPT | Performed by: INTERNAL MEDICINE

## 2021-11-11 PROCEDURE — 25010000002 HEPARIN (PORCINE) PER 1000 UNITS: Performed by: INTERNAL MEDICINE

## 2021-11-11 PROCEDURE — G0378 HOSPITAL OBSERVATION PER HR: HCPCS

## 2021-11-11 PROCEDURE — 93010 ELECTROCARDIOGRAM REPORT: CPT | Performed by: INTERNAL MEDICINE

## 2021-11-11 PROCEDURE — 93005 ELECTROCARDIOGRAM TRACING: CPT | Performed by: STUDENT IN AN ORGANIZED HEALTH CARE EDUCATION/TRAINING PROGRAM

## 2021-11-11 PROCEDURE — 94760 N-INVAS EAR/PLS OXIMETRY 1: CPT

## 2021-11-11 PROCEDURE — 80053 COMPREHEN METABOLIC PANEL: CPT | Performed by: STUDENT IN AN ORGANIZED HEALTH CARE EDUCATION/TRAINING PROGRAM

## 2021-11-11 RX ORDER — RANOLAZINE 500 MG/1
500 TABLET, EXTENDED RELEASE ORAL EVERY 12 HOURS SCHEDULED
Status: DISCONTINUED | OUTPATIENT
Start: 2021-11-11 | End: 2021-11-15 | Stop reason: HOSPADM

## 2021-11-11 RX ORDER — BUMETANIDE 1 MG/1
2 TABLET ORAL
Status: DISCONTINUED | OUTPATIENT
Start: 2021-11-12 | End: 2021-11-15 | Stop reason: HOSPADM

## 2021-11-11 RX ORDER — HEPARIN SODIUM 1000 [USP'U]/ML
2000 INJECTION, SOLUTION INTRAVENOUS; SUBCUTANEOUS AS NEEDED
Status: DISCONTINUED | OUTPATIENT
Start: 2021-11-11 | End: 2021-11-15 | Stop reason: HOSPADM

## 2021-11-11 RX ORDER — ALBUMIN (HUMAN) 12.5 G/50ML
12.5 SOLUTION INTRAVENOUS AS NEEDED
Status: DISPENSED | OUTPATIENT
Start: 2021-11-11 | End: 2021-11-11

## 2021-11-11 RX ADMIN — GABAPENTIN 400 MG: 400 CAPSULE ORAL at 08:01

## 2021-11-11 RX ADMIN — INSULIN ASPART 4 UNITS: 100 INJECTION, SOLUTION INTRAVENOUS; SUBCUTANEOUS at 17:15

## 2021-11-11 RX ADMIN — INSULIN ASPART 30 UNITS: 100 INJECTION, SOLUTION INTRAVENOUS; SUBCUTANEOUS at 12:20

## 2021-11-11 RX ADMIN — MONTELUKAST 10 MG: 10 TABLET, FILM COATED ORAL at 20:11

## 2021-11-11 RX ADMIN — IPRATROPIUM BROMIDE AND ALBUTEROL SULFATE 3 ML: 2.5; .5 SOLUTION RESPIRATORY (INHALATION) at 14:20

## 2021-11-11 RX ADMIN — ISOSORBIDE MONONITRATE 30 MG: 30 TABLET, EXTENDED RELEASE ORAL at 05:34

## 2021-11-11 RX ADMIN — ALBUMIN HUMAN 12.5 G: 0.25 SOLUTION INTRAVENOUS at 11:30

## 2021-11-11 RX ADMIN — NYSTATIN: 100000 POWDER TOPICAL at 08:02

## 2021-11-11 RX ADMIN — FLUTICASONE PROPIONATE 2 SPRAY: 50 SPRAY, METERED NASAL at 08:02

## 2021-11-11 RX ADMIN — GABAPENTIN 400 MG: 400 CAPSULE ORAL at 17:15

## 2021-11-11 RX ADMIN — RANOLAZINE 500 MG: 500 TABLET, FILM COATED, EXTENDED RELEASE ORAL at 20:11

## 2021-11-11 RX ADMIN — IPRATROPIUM BROMIDE AND ALBUTEROL SULFATE 3 ML: 2.5; .5 SOLUTION RESPIRATORY (INHALATION) at 10:52

## 2021-11-11 RX ADMIN — NYSTATIN: 100000 POWDER TOPICAL at 20:17

## 2021-11-11 RX ADMIN — CETIRIZINE HYDROCHLORIDE 10 MG: 10 TABLET, FILM COATED ORAL at 08:01

## 2021-11-11 RX ADMIN — CLOPIDOGREL BISULFATE 75 MG: 75 TABLET ORAL at 08:01

## 2021-11-11 RX ADMIN — ATORVASTATIN CALCIUM 20 MG: 20 TABLET, FILM COATED ORAL at 08:01

## 2021-11-11 RX ADMIN — NYSTATIN: 100000 POWDER TOPICAL at 17:16

## 2021-11-11 RX ADMIN — INSULIN ASPART 7 UNITS: 100 INJECTION, SOLUTION INTRAVENOUS; SUBCUTANEOUS at 12:20

## 2021-11-11 RX ADMIN — SODIUM CHLORIDE, PRESERVATIVE FREE 10 ML: 5 INJECTION INTRAVENOUS at 08:10

## 2021-11-11 RX ADMIN — HEPARIN SODIUM 5000 UNITS: 5000 INJECTION INTRAVENOUS; SUBCUTANEOUS at 08:01

## 2021-11-11 RX ADMIN — HEPARIN SODIUM 2000 UNITS: 1000 INJECTION INTRAVENOUS; SUBCUTANEOUS at 12:51

## 2021-11-11 RX ADMIN — GABAPENTIN 400 MG: 400 CAPSULE ORAL at 20:11

## 2021-11-11 RX ADMIN — DULOXETINE HYDROCHLORIDE 20 MG: 20 CAPSULE, DELAYED RELEASE ORAL at 08:01

## 2021-11-11 RX ADMIN — ASPIRIN 81 MG: 81 TABLET, FILM COATED ORAL at 08:01

## 2021-11-11 RX ADMIN — IPRATROPIUM BROMIDE AND ALBUTEROL SULFATE 3 ML: 2.5; .5 SOLUTION RESPIRATORY (INHALATION) at 06:46

## 2021-11-11 RX ADMIN — OXYBUTYNIN CHLORIDE 5 MG: 5 TABLET, EXTENDED RELEASE ORAL at 08:01

## 2021-11-11 RX ADMIN — FERROUS SULFATE TAB EC 324 MG (65 MG FE EQUIVALENT) 324 MG: 324 (65 FE) TABLET DELAYED RESPONSE at 08:01

## 2021-11-11 RX ADMIN — INSULIN ASPART 7 UNITS: 100 INJECTION, SOLUTION INTRAVENOUS; SUBCUTANEOUS at 08:01

## 2021-11-11 RX ADMIN — HEPARIN SODIUM 2000 UNITS: 1000 INJECTION INTRAVENOUS; SUBCUTANEOUS at 12:53

## 2021-11-11 RX ADMIN — LEVOTHYROXINE SODIUM 25 MCG: 25 TABLET ORAL at 08:01

## 2021-11-11 RX ADMIN — BUMETANIDE 2 MG: 1 TABLET ORAL at 08:01

## 2021-11-11 RX ADMIN — METOPROLOL TARTRATE 50 MG: 50 TABLET, FILM COATED ORAL at 20:11

## 2021-11-11 RX ADMIN — SODIUM CHLORIDE, PRESERVATIVE FREE 10 ML: 5 INJECTION INTRAVENOUS at 20:12

## 2021-11-11 RX ADMIN — INSULIN ASPART 30 UNITS: 100 INJECTION, SOLUTION INTRAVENOUS; SUBCUTANEOUS at 17:16

## 2021-11-11 RX ADMIN — HEPARIN SODIUM 5000 UNITS: 5000 INJECTION INTRAVENOUS; SUBCUTANEOUS at 20:12

## 2021-11-11 RX ADMIN — PANTOPRAZOLE SODIUM 40 MG: 40 TABLET, DELAYED RELEASE ORAL at 20:11

## 2021-11-11 RX ADMIN — MORPHINE SULFATE 1 MG: 2 INJECTION, SOLUTION INTRAMUSCULAR; INTRAVENOUS at 14:12

## 2021-11-11 RX ADMIN — INSULIN ASPART 30 UNITS: 100 INJECTION, SOLUTION INTRAVENOUS; SUBCUTANEOUS at 08:02

## 2021-11-11 RX ADMIN — INSULIN DETEMIR 50 UNITS: 100 INJECTION, SOLUTION SUBCUTANEOUS at 20:11

## 2021-11-11 RX ADMIN — MORPHINE SULFATE 1 MG: 2 INJECTION, SOLUTION INTRAMUSCULAR; INTRAVENOUS at 20:12

## 2021-11-11 RX ADMIN — IPRATROPIUM BROMIDE AND ALBUTEROL SULFATE 3 ML: 2.5; .5 SOLUTION RESPIRATORY (INHALATION) at 20:23

## 2021-11-11 NOTE — PROGRESS NOTES
"[facility]  [address] [place] [address]. [address]  T - [phone]  F - [phone]     Progress Note          PATIENT  DEMOGRAPHICS   PATIENT NAME: Yamileth Slater                      PHYSICIAN: Ace Lauren MD  : 1960  MRN: [id number]   LOS: 0 days    Patient Care Team:  Rianna Macias MD as PCP - General (Family Medicine)  Subjective   SUBJECTIVE   bp low, only doing 2 L today         Objective   OBJECTIVE   Vital Signs  Temp:  [97 °F (36.1 °C)-97.7 °F (36.5 °C)] 97.4 °F (36.3 °C)  Heart Rate:  [65-84] 71  Resp:  [16-20] 16  BP: (119-133)/(45-66) 119/45    Flowsheet Rows      First Filed Value   Admission Height 157.5 cm (62\") Documented at 2021 1434   Admission Weight 128 kg (282 lb) Documented at 2021 1434           I/O last 3 completed shifts:  In: 480 [P.O.:480]  Out: -     PHYSICAL EXAM    Physical Exam  Vitals reviewed.   Constitutional:       Appearance: Normal appearance.   HENT:      Nose: Nose normal.   Cardiovascular:      Rate and Rhythm: Normal rate and regular rhythm.      Pulses: Normal pulses.      Heart sounds: Normal heart sounds. No murmur heard.      Pulmonary:      Effort: Pulmonary effort is normal.      Breath sounds: No wheezing or rales.   Abdominal:      General: Abdomen is flat. There is no distension.      Palpations: Abdomen is soft.      Tenderness: There is no abdominal tenderness.   Musculoskeletal:         General: No swelling.   Skin:     Coloration: Skin is not jaundiced.      Findings: No erythema.   Neurological:      General: No focal deficit present.      Mental Status: She is alert. She is disoriented.         RESULTS   Results Review:    Results from last 7 days   Lab Units 21  0600 11/10/21  0538 21  0608   SODIUM mmol/L 125* 127* 132*   POTASSIUM mmol/L 4.6 4.5 4.2   CHLORIDE mmol/L 88* 90* 95*   CO2 mmol/L 21.0* 23.0 25.0   BUN mg/dL 56* 47* 51*   CREATININE mg/dL 4.08* 3.49* 3.16*   CALCIUM mg/dL " 8.8 8.9 9.1   BILIRUBIN mg/dL <0.2 0.2 0.2   ALK PHOS U/L 177* 151* 127*   ALT (SGPT) U/L 9 10 9   AST (SGOT) U/L 9 11 13   GLUCOSE mg/dL 437* 293* 215*       Estimated Creatinine Clearance: 18.4 mL/min (A) (by C-G formula based on SCr of 4.08 mg/dL (H)).                Results from last 7 days   Lab Units 11/11/21  0600 11/10/21  0538 11/09/21  0608 11/08/21  1450   WBC 10*3/mm3 8.15 8.41 10.01 8.14   HEMOGLOBIN g/dL 8.2* 8.5* 8.4* 7.9*   PLATELETS 10*3/mm3 267 288 265 252               Imaging Results (Last 24 Hours)     ** No results found for the last 24 hours. **           MEDICATIONS    aspirin, 81 mg, Oral, Daily  atorvastatin, 20 mg, Oral, Daily  bumetanide, 2 mg, Oral, Once per day on Sun Tue Thu Sat  cetirizine, 10 mg, Oral, Daily  clopidogrel, 75 mg, Oral, Daily  DULoxetine, 20 mg, Oral, Daily  ferrous sulfate, 324 mg, Oral, Daily With Breakfast  fluticasone, 2 spray, Each Nare, Daily  gabapentin, 400 mg, Oral, TID  heparin (porcine), 5,000 Units, Subcutaneous, Q12H  insulin aspart, 0-7 Units, Subcutaneous, TID AC  insulin aspart, 30 Units, Subcutaneous, TID With Meals  insulin detemir, 50 Units, Subcutaneous, Nightly  ipratropium-albuterol, 3 mL, Nebulization, 4x Daily - RT  isosorbide mononitrate, 30 mg, Oral, QAM  levothyroxine, 25 mcg, Oral, Daily  lidocaine, 1 patch, Transdermal, Q24H  lisinopril, 10 mg, Oral, Daily  metoprolol tartrate, 50 mg, Oral, Q12H  montelukast, 10 mg, Oral, Nightly  nystatin, , Topical, TID  oxybutynin XL, 5 mg, Oral, Daily  pantoprazole, 40 mg, Oral, Nightly  sennosides-docusate, 2 tablet, Oral, BID  sodium chloride, 10 mL, Intravenous, Q12H           Assessment/Plan   ASSESSMENT / PLAN      Chest pain    COPD (chronic obstructive pulmonary disease) (HCC)    CKD (chronic kidney disease), symptom management only, stage 5 (HCC)    Acute on chronic congestive heart failure (HCC)    Anemia    Type 2 diabetes mellitus with hyperglycemia (HCC)    Pneumonitis    1.  ESRD on HD-  Initiated HD on 10/21 due to hyperkalemia and fluid overload, now likely ESRD.  HD TTS now. hd today with 2 L UF. permcath flow is only 300ml     2.  Hyponatremia- Na <130. Modulate with HD     3.  Chest pain- ongoing ACS evaluation, stress test shows small-sized, mildly severe area of ischemia located in the basal inferior lateral wall. May need cardiology evaluation     4.  History of CAD     5.  History of COPD - On trilogy at night but left it at home     6.  Anemia / recent GI bleed / b12 deficiency-  s/p capsule endoscopy which showed few small non-bleeding intestinal AVMs and single small polyp. Keep b12. Keep folate. Check Fe.     7. 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 HTN- Continue home antihypertensives. Low dose lisinopril.                This document has been electronically signed by Ace Lauren MD on November 11, 2021 12:00 CST

## 2021-11-11 NOTE — NURSING NOTE
Patient has been educated on the importance of following diabetic diet. Patients blood sugars have remained over 400 today with insulin coverage. Patient states she understands.

## 2021-11-11 NOTE — PROGRESS NOTES
FAMILY MEDICINE RESIDENCY SERVICE  DAILY PROGRESS NOTE    NAME: Yamileth Slater  : 1960  MRN: [id number]      LOS: 0 days     PROVIDER OF SERVICE: Ashley Coker MD    Chief Complaint: Chest pain    Subjective:     Interval History:  History taken from: patient    Patient states that she feels that she is doing well.  She states that she does not chest pain right now but sometimes it comes and goes.  She denies abdominal pain, nausea, vomiting, problems with urination and bowel movements.      Review of Systems:   Review of Systems   Constitutional: Negative for fatigue.   Respiratory: Positive for shortness of breath.    Cardiovascular: Negative for chest pain and leg swelling.   Gastrointestinal: Negative for abdominal pain, constipation, diarrhea and nausea.   Endocrine: Negative for cold intolerance.   Genitourinary: Negative for menstrual problem.   Skin: Negative for rash.   Neurological: Negative for weakness, light-headedness and numbness.       Objective:     Vital Signs  Temp:  [97 °F (36.1 °C)-97.7 °F (36.5 °C)] 97.4 °F (36.3 °C)  Heart Rate:  [65-84] 70  Resp:  [16-20] 18  BP: (124-133)/(58-66) 133/62  Flow (L/min):  [3] 3   Body mass index is 52.16 kg/m².    Physical Exam  Physical Exam  Vitals reviewed.   Constitutional:       Appearance: Normal appearance. She is obese.   HENT:      Head: Normocephalic and atraumatic.      Right Ear: Tympanic membrane normal.      Left Ear: Tympanic membrane normal.      Nose: Nose normal.      Mouth/Throat:      Mouth: Mucous membranes are moist.   Eyes:      Pupils: Pupils are equal, round, and reactive to light.   Cardiovascular:      Pulses: Normal pulses.      Heart sounds: Normal heart sounds.   Pulmonary:      Effort: Pulmonary effort is normal.      Breath sounds: Normal breath sounds.   Abdominal:      General: Abdomen is flat. Bowel sounds are normal.      Palpations: Abdomen is soft.   Musculoskeletal:         General: Normal range of  motion.      Cervical back: Normal range of motion and neck supple.   Skin:     General: Skin is warm and dry.      Capillary Refill: Capillary refill takes less than 2 seconds.   Neurological:      General: No focal deficit present.      Mental Status: She is alert and oriented to person, place, and time. Mental status is at baseline.   Psychiatric:         Mood and Affect: Mood normal.         Scheduled Meds   aspirin, 81 mg, Oral, Daily  atorvastatin, 20 mg, Oral, Daily  bumetanide, 2 mg, Oral, Once per day on Sun Tue Thu Sat  cetirizine, 10 mg, Oral, Daily  clopidogrel, 75 mg, Oral, Daily  DULoxetine, 20 mg, Oral, Daily  ferrous sulfate, 324 mg, Oral, Daily With Breakfast  fluticasone, 2 spray, Each Nare, Daily  gabapentin, 400 mg, Oral, TID  heparin (porcine), 5,000 Units, Subcutaneous, Q12H  insulin aspart, 0-7 Units, Subcutaneous, TID AC  insulin aspart, 30 Units, Subcutaneous, TID With Meals  insulin detemir, 50 Units, Subcutaneous, Nightly  ipratropium-albuterol, 3 mL, Nebulization, 4x Daily - RT  isosorbide mononitrate, 30 mg, Oral, QAM  levothyroxine, 25 mcg, Oral, Daily  lidocaine, 1 patch, Transdermal, Q24H  lisinopril, 10 mg, Oral, Daily  metoprolol tartrate, 50 mg, Oral, Q12H  montelukast, 10 mg, Oral, Nightly  nystatin, , Topical, TID  oxybutynin XL, 5 mg, Oral, Daily  pantoprazole, 40 mg, Oral, Nightly  sennosides-docusate, 2 tablet, Oral, BID  sodium chloride, 10 mL, Intravenous, Q12H       PRN Meds   •  acetaminophen  •  albuterol  •  cyclobenzaprine  •  dextrose  •  dextrose  •  glucagon (human recombinant)  •  heparin (porcine)  •  influenza vaccine  •  melatonin  •  Morphine **AND** naloxone  •  nitroglycerin  •  ondansetron  •  ondansetron ODT  •  promethazine  •  sodium chloride  •  sodium chloride      Diagnostic Data    Results from last 7 days   Lab Units 11/11/21  0600   WBC 10*3/mm3 8.15   HEMOGLOBIN g/dL 8.2*   HEMATOCRIT % 25.0*   PLATELETS 10*3/mm3 267   GLUCOSE mg/dL 437*    CREATININE mg/dL 4.08*   BUN mg/dL 56*   SODIUM mmol/L 125*   POTASSIUM mmol/L 4.6   AST (SGOT) U/L 9   ALT (SGPT) U/L 9   ALK PHOS U/L 177*   BILIRUBIN mg/dL <0.2   ANION GAP mmol/L 16.0*       No radiology results for the last day      I reviewed the patient's new clinical results.    Assessment/Plan:     Active Hospital Problems    Diagnosis  POA   • **Chest pain [R07.9]  Yes     -S/P CABG x 3 (Primary), Bilateral carotid artery stenosis w/ 2 stents on left side,  PAD (peripheral artery disease) with stent in right upper leg, Morbid obesity   -Continue aspirin, Plavix, atorvastatin, lisinopril,  isosorbide mononitrate 60 mg daily     -EKG: No ST segment changes.   -roponin negative  -NPO after midnight  -Cardio consult.      • Anemia [D64.9]  Unknown     -Anemia of chronic disease vs GI bleed  -No active bleeding on recent EGD/Colonoscopy  -Recent capsule endoscopy which showed few small nonbleeding intestinal AVMs and single small intestinal polyp which GI planned to follow outpatient  -Continue to monitor H/H  -Will transfuse if Hgb <8  -Continue home ferrous sulfate. May be a candidate for epogen and IV iron  -Follow stool occult test     • Type 2 diabetes mellitus with hyperglycemia (HCC) [E11.65]  Unknown     -Continue 20 units of levemir in the morning , 30 units of novolog with meals, and 50 units of levemir at night  -SSI for additional coverage     • Pneumonitis [J18.9]  Unknown   • Acute on chronic congestive heart failure (HCC) [I50.9]  Yes     - Continue Imdur, metoprolol, lisinopril      • CKD (chronic kidney disease), symptom management only, stage 5 (HCC) [N18.5]  Yes     Results from last 7 days   Lab Units 11/10/21  0538 11/09/21  0608 11/08/21  1450   CREATININE mg/dL 3.49* 3.16* 3.27*     -Completes outpatient HD on TTS  -Nephrology on board. Will require HD in house     • COPD (chronic obstructive pulmonary disease) (HCC) [J44.9]  Yes     -O2 supplementation  -Home inhalers as  needed  -DuoNebs  -CPAP at night          DVT prophylaxis: Heparin  Code status is   Code Status and Medical Interventions:   Ordered at: 11/08/21 1734     Code Status (Patient has no pulse and is not breathing):    CPR (Attempt to Resuscitate)     Medical Interventions (Patient has pulse or is breathing):    Full Support       Plan for disposition:Where: home      Time: 15 min        This document has been electronically signed by Ashley Coker MD on November 11, 2021 08:08 CST

## 2021-11-11 NOTE — SIGNIFICANT NOTE
11/11/21 0800   OTHER   Discipline occupational therapist; physical therapist   Rehab Time/Intention   Session Not Performed patient unavailable for evaluation  (Patient starting dialysis. Will last 3.5 hours. Will attempt evaluation as able.)     Attempt 2 12:40- Patient with health care provided (MD)    Attempt 3: 13:35- Patient just finished dialysis, eating her lunch, declining therapy eval at this time.

## 2021-11-11 NOTE — CONSULTS
[facility] Consult    Referring Provider: Abel Bryan MD    Reason for Consultation: chest pain, abnormal nuclear     Patient Care Team:  Rianna Macias MD as PCP - General (Family Medicine)    Chief complaint   Chief Complaint   Patient presents with    Chest Pain       Subjective .     History of present illness:     Yamileth Slater is a 61-year-old  female with past medical history of COPD (on home 02), end-stage renal disease on hemodialysis, CAD, type 2 diabetes, hypertension, morbid obesity, PVD who presented to the hospital with intermittent chest pain while at rehabilitation center.  Patient recently has had multiple admissions for cellulitis, shortness of breath, fluid overload, ERIKA was started on hemodialysis the past few weeks.      Patient reports intermittent central substernal chest pain, sudden onset, squeezing/pressure-like. Worse and prolonged the day of presentation. Patient denies radiation.  She denies any worsening or relieving factors.  Reports intermittent chest pain times several years however worsening in past few weeks.  Patient denies any worsening diaphoresis, shortness of breath, palpitations PND or orthopnea.      She has an extensive cardiac history and underwent coronary artery bypass surgery in 2013.  She was evaluated for non-ST segment elevation myocardial infarction in April 2021.  Cardiac catheterization by Dr. Romo showed severe native vessel CAD with left main disease and patent LIMA to LAD but nonfunctioning SVG to RCA and ramus intermedius.     She was referred for redo CABG to [facility], but underwent PCI to discrete lesion of the left main coronary artery, proximal circumflex coronary artery and proximal SVG to ramus intermedius as described below,    Conclusions:   1. Left main: Discrete 70% distal stenosis.  2. LAD: Chronic total occlusion mid segment.  3. LCX: Severely calcified ostial 99% stenosis with BEATRIZ II flow  4. RCA:  Chronic total occlusion mid segment  5.  Ramus: Severely calcified 80% mid vessel stenosis.  6.  SVG to ramus: 99% proximal stenosis.  Diffuse 80% distal stenosis.  7.  SVG to RCA: Occluded proximal anastomosis  8.  Successful PCI of the left main and proximal circumflex coronary artery with a 3.0 x 28 mm Xience Gloria drug-eluting stent postdilated to high pressure with a 3.5 mm NC trek balloon in the proximal segment  9.  PTCA of the native ramus with severe calcification and balloon rupture with noncompliant balloon.  Vessel too small for rotational atherectomy.  10.  Successful PCI of the proximal SVG to ramus with a 2.75 x 28 mm Xience Gloria drug-eluting stent.  PTCA of the distal anastomosis of the SVG to ramus with a 2.5 mm trek balloon.         The CVD Risk score (CAESAR'Pretty, et al., 2008) failed to calculate for the following reasons:    The patient has a prior MI, stroke, CHF, or peripheral vascular disease diagnosis      Review of Systems  Review of Systems   Constitutional: Positive for fatigue. Negative for activity change, chills, fever and unexpected weight change.   HENT: Negative for hearing loss, nosebleeds, tinnitus, trouble swallowing and voice change.    Eyes: Negative for visual disturbance.   Respiratory: Negative for apnea, cough, chest tightness, shortness of breath and wheezing.    Cardiovascular: Negative for palpitations and leg swelling.   Gastrointestinal: Negative for abdominal distention, abdominal pain and blood in stool.   Endocrine: Negative for cold intolerance, heat intolerance, polydipsia, polyphagia and polyuria.   Genitourinary:        HD   Musculoskeletal: Positive for arthralgias. Negative for back pain.   Skin: Negative.    Allergic/Immunologic: Negative.    Neurological: Negative for seizures, syncope, speech difficulty, numbness and headaches.   Hematological: Negative for adenopathy. Does not bruise/bleed easily.   Psychiatric/Behavioral: Negative for agitation,  behavioral problems and suicidal ideas. The patient is not nervous/anxious.        History  Past Medical History:   Diagnosis Date    Acute blood loss anemia 4/16/2017    Likely due to gastric oozing at this time. - Dr. Duarte (GI) was consulted and has now signed off, will follow up outpatient - pill colonoscopy showed AVMs - continue to monitor    Anxiety     Carotid artery stenosis     Chronic obstructive lung disease (HCC)     CKD (chronic kidney disease) stage 4, GFR 15-29 ml/min (HCC)     Colonic polyp     Coronary arteriosclerosis     Diabetes mellitus (HCC)     Diabetic neuropathy (HCC)     Ear pain, right 10/18/2021    - canal trauma due to patient scratching and DMT2 - added cortisporin ear drops    Elevated troponin 10/12/2021    -most likely from CKD -Trending down -Neg chest pain    GERD (gastroesophageal reflux disease)     GI bleed 5/13/2021    - GI will follow up outpatient - Protonix 40mg daily - Avoid medical DVT prophy and use mechanical at this time instead. - Continue to monitor - pill colonoscopy results showed AVMs    History of transfusion     Hypercholesterolemia     Hypertension     Hypomagnesemia 6/27/2021    Monitor and replace    Morbid obesity ([facility])     Nephrolithiasis     Peripheral vascular disease ([facility])     Sleep apnea     Substance abuse ([facility])     Vitamin D deficiency    ,   Past Surgical History:   Procedure Laterality Date    CARDIAC CATHETERIZATION N/A 7/14/2020    CARDIAC CATHETERIZATION N/A 4/23/2021    Procedure: Left Heart Cath;  Surgeon: Melba Romo MD;  Location: [facility] INVAS[facility] LOC[facility];  Service: Cardiology;  Laterality: N/A;    CARDIAC CATHETERIZATION N/A 4/30/2021    Procedure: Percutaneous Coronary Intervention;  Surgeon: Russell Voss MD;  Location: [facility] INVASIVE LOCATION;  Service: Cardiovascular;  Laterality: N/A;    CARDIAC CATHETERIZATION N/A 4/30/2021    Procedure: Stent NIKKI coronary;  Surgeon: Russell Voss MD;  Location:   [facility] INVASIVE LOCATION;  Service: Cardiovascular;  Laterality: N/A;    CAROTID STENT Left     COLONOSCOPY      COLONOSCOPY N/A 5/14/2021    Procedure: COLONOSCOPY;  Surgeon: Mingo Duarte MD;  Location: [facility] ENDOSCOPY;  Service: Gastroenterology;  Laterality: N/A;    CORONARY ARTERY BYPASS GRAFT N/A 2013    CABG X 3    CYSTOSCOPY BLADDER STONE LITHOTRIPSY Bilateral     ENDOSCOPY N/A 4/12/2021    Procedure: ESOPHAGOGASTRODUODENOSCOPY;  Surgeon: Mingo Duarte MD;  Location: [facility] ENDOSCOPY;  Service: Gastroenterology;  Laterality: N/A;    ENDOSCOPY N/A 5/14/2021    Procedure: ESOPHAGOGASTRODUODENOSCOPY;  Surgeon: Mingo Duarte MD;  Location: [facility] ENDOSCOPY;  Service: Gastroenterology;  Laterality: N/A;    INTERVENTIONAL RADIOLOGY PROCEDURE N/A 10/21/2021    Procedure: tunneled central venous catheter placement;  Surgeon: Donnie Robles MD;  Location: [facility] INVASIVE LOCATION;  Service: Interventional Radiology;  Laterality: N/A;   ,   Family History   Problem Relation Age of Onset    Heart disease Mother     Heart disease Father     Heart disease Sister     Heart disease Brother    ,   Social History     Tobacco Use    Smoking status: Former Smoker     Packs/day: 0.25     Years: 46.00     Pack years: 11.50     Types: Cigarettes    Smokeless tobacco: Never Used    Tobacco comment: only smoking 5 a day - quit april 23 2021   Substance Use Topics    Alcohol use: No    Drug use: Not Currently     Types: LSD, Marijuana, Methamphetamines   ,   Medications Prior to Admission   Medication Sig Dispense Refill Last Dose    albuterol (PROVENTIL) (2.5 MG/3ML) 0.083% nebulizer solution Inhale the contents of 1 vial by nebulization Every 4 (Four) Hours As Needed for Wheezing. 75 mL 3 11/8/2021 at Unknown time    albuterol sulfate  (90 Base) MCG/ACT inhaler Inhale 2 puffs Every 4 (Four) Hours As Needed for Wheezing. 18 g 1 Past Month at Unknown time    aspirin (aspirin) 81 MG EC  tablet Take 1 tablet by mouth Daily. 60 tablet 5 11/8/2021 at Unknown time    atorvastatin (LIPITOR) 20 MG tablet TAKE ONE TABLET BY MOUTH EVERY NIGHT AT BEDTIME 30 tablet 1 11/7/2021 at Unknown time    cetirizine (zyrTEC) 10 MG tablet Take 1 tablet by mouth Daily. 30 tablet 3 11/8/2021 at Unknown time    clopidogrel (PLAVIX) 75 MG tablet Take 1 tablet by mouth Daily. 30 tablet 5 11/8/2021 at Unknown time    cyclobenzaprine (FLEXERIL) 10 MG tablet Take 1 tablet by mouth 2 (Two) Times a Day As Needed for Muscle Spasms. 60 tablet 5 11/7/2021 at Unknown time    DULoxetine (CYMBALTA) 20 MG capsule TAKE 1 CAPSULE BY MOUTH DAILY. 30 capsule 2 11/8/2021 at Unknown time    ferrous sulfate (FeroSul) 325 (65 FE) MG tablet Take 1 tablet by mouth Daily With Breakfast. 30 tablet 3 11/8/2021 at Unknown time    gabapentin (NEURONTIN) 400 MG capsule Take 1 capsule by mouth 3 (Three) Times a Day. 90 capsule 0 11/8/2021 at Unknown time    insulin aspart (novoLOG FLEXPEN) 100 UNIT/ML solution pen-injector sc pen Inject 30 Units under the skin into the appropriate area as directed 3 (Three) Times a Day With Meals. 30 mL 12 11/8/2021 at Unknown time    insulin degludec (Tresiba FlexTouch) 100 UNIT/ML solution pen-injector injection Inject 50 Units under the skin into the appropriate area as directed Every Night. 15 mL 10 11/7/2021 at Unknown time    ipratropium-albuterol (DUO-NEB) 0.5-2.5 mg/3 ml nebulizer Take 3 mL by nebulization 4 (Four) Times a Day.   11/8/2021 at Unknown time    levothyroxine (SYNTHROID, LEVOTHROID) 25 MCG tablet Take 25 mcg by mouth Daily.   11/8/2021 at Unknown time    lidocaine (LIDODERM) 5 % APPLY TO THE AFFECTED AREA(S) TOPICALLY TWO TIMES A DAY. REMOVE NIGHTLY 30 patch 1 11/8/2021 at Unknown time    lisinopril (PRINIVIL,ZESTRIL) 10 MG tablet Take 1 tablet by mouth Daily. 30 tablet 5 11/8/2021 at Unknown time    metoprolol tartrate (LOPRESSOR) 50 MG tablet TAKE ONE TABLET BY MOUTH TWO TIMES A DAY. HOLD IF  PULSE IS LESS THAN 60 60 tablet 1 11/8/2021 at Unknown time    montelukast (SINGULAIR) 10 MG tablet Take 1 tablet by mouth Every Night. 30 tablet 5 11/8/2021 at Unknown time    nitroglycerin (NITROSTAT) 0.4 MG SL tablet Place 0.4 mg under the tongue Every 5 (Five) Minutes As Needed for Chest Pain (x 3 doses).   11/8/2021 at Unknown time    nystatin (MYCOSTATIN) 490280 UNIT/GM powder Apply  topically to the appropriate area as directed 3 (Three) Times a Day. 15 g 1 11/8/2021 at Unknown time    ondansetron ODT (Zofran ODT) 4 MG disintegrating tablet Place 1 tablet on the tongue Every 8 (Eight) Hours As Needed for Nausea or Vomiting. 30 tablet 0 Past Week at Unknown time    oxybutynin XL (DITROPAN-XL) 5 MG 24 hr tablet Take 1 tablet by mouth Daily. 90 tablet 1 11/8/2021 at Unknown time    pantoprazole (PROTONIX) 40 MG EC tablet Take 1 tablet by mouth Every Night. 30 tablet 5 11/8/2021 at Unknown time    sennosides-docusate (PERICOLACE) 8.6-50 MG per tablet Take 2 tablets by mouth 2 (Two) Times a Day. 60 tablet 2 11/8/2021 at Unknown time    sodium bicarbonate 650 MG tablet Take 1 tablet by mouth 2 (Two) Times a Day. (Patient taking differently: Take 2 tablets by mouth 2 (Two) Times a Day.) 60 tablet 1 11/8/2021 at Unknown time    acetaminophen (Tylenol 8 Hour) 650 MG 8 hr tablet Take 1 tablet by mouth Every 8 (Eight) Hours As Needed for Mild Pain . 90 tablet 0 More than a month at Unknown time    Blood Glucose Monitoring Suppl ([employer] Blood Glucose Meter) w/Device kit 1 each 3 (Three) Times a Day. 1 kit 3     bumetanide (BUMEX) 2 MG tablet Take 1 tablet by mouth 4 (Four) Times a Week. 30 tablet 0 Unknown at Unknown time    Continuous Blood Gluc  (FreeStyle Jade 2 Griffin) device 1 each Continuous. 1 each 0     Continuous Blood Gluc Sensor (FreeStyle Jade 2 Sensor) misc 1 each Every 14 (Fourteen) Days. 2 each 11     Dulaglutide (Trulicity) 0.75 MG/0.5ML solution pen-injector Inject 0.75 mg (1 pen) under the skin  "into the appropriate area as directed 1 (One) Time Per Week. 2 mL 6 Unknown at Unknown time    EASY TOUCH PEN NEEDLES 31G X 8 MM misc        glucose blood test strip Use as instructed 100 each 12     Insulin Pen Needle (NovoFine) 30G X 8 MM misc As directed 4 times daily 100 each 11     Insulin Pen Needle 31G X 8 MM misc Use to inject insulin 4 (Four) Times a Day as directed. 120 each 12     isosorbide mononitrate (IMDUR) 60 MG 24 hr tablet Take 1 tablet by mouth Every Morning. (Patient taking differently: Take 0.5 tablets by mouth Every Morning. TAKING 30 MG ) 90 tablet 3     promethazine (PHENERGAN) 25 MG tablet Take 25 mg by mouth Every 6 (Six) Hours As Needed.   Unknown at Unknown time      ALLERGIES  Adhesive tape and Other    Objective     Vital Sign Min/Max for last 24 hours  Temp  Min: 97 °F (36.1 °C)  Max: 97.7 °F (36.5 °C)   BP  Min: 114/43  Max: 133/62   Pulse  Min: 65  Max: 84   Resp  Min: 16  Max: 20   SpO2  Min: 93 %  Max: 100 %   Flow (L/min)  Min: 3  Max: 3   Weight  Min: 129 kg (285 lb 3.2 oz)  Max: 129 kg (285 lb 3.2 oz)     Flowsheet Rows        First Filed Value   Admission Height 157.5 cm (62\") Documented at 11/08/2021 1434   Admission Weight 128 kg (282 lb) Documented at 11/08/2021 1434               Physical Exam:  Physical Exam  Vitals and nursing note reviewed.   Constitutional:       General: She is not in acute distress.     Appearance: She is morbidly obese. She is not ill-appearing, toxic-appearing or diaphoretic.      Interventions: Nasal cannula in place.   HENT:      Head: Normocephalic and atraumatic.      Right Ear: External ear normal.      Left Ear: External ear normal.   Eyes:      General: Lids are normal.      Pupils: Pupils are equal, round, and reactive to light.   Neck:      Thyroid: No thyromegaly.      Vascular: No carotid bruit or JVD.      Trachea: No tracheal deviation.   Cardiovascular:      Rate and Rhythm: Normal rate and regular rhythm.      Chest Wall: PMI is not " displaced. No thrill.      Heart sounds: Normal heart sounds, S1 normal and S2 normal. No murmur heard.  No friction rub. No gallop. No S3 or S4 sounds.    Pulmonary:      Effort: Pulmonary effort is normal. No respiratory distress.      Breath sounds: No stridor. Decreased breath sounds present. No wheezing or rales.   Chest:      Chest wall: No tenderness.   Abdominal:      General: Bowel sounds are normal. There is no distension.      Palpations: Abdomen is soft.      Tenderness: There is no abdominal tenderness.   Musculoskeletal:      Right lower leg: No edema.      Left lower leg: No edema.   Skin:     General: Skin is warm and dry.      Findings: No erythema or rash.   Neurological:      Mental Status: She is alert and oriented to person, place, and time.      Cranial Nerves: No cranial nerve deficit.   Psychiatric:         Behavior: Behavior normal.         Thought Content: Thought content normal.         Judgment: Judgment normal.            Results Review:     Results from last 7 days   Lab Units 11/11/21  0600 11/10/21  0538 11/09/21  0608   SODIUM mmol/L 125* 127* 132*   POTASSIUM mmol/L 4.6 4.5 4.2   CHLORIDE mmol/L 88* 90* 95*   CO2 mmol/L 21.0* 23.0 25.0   BUN mg/dL 56* 47* 51*   CREATININE mg/dL 4.08* 3.49* 3.16*   CALCIUM mg/dL 8.8 8.9 9.1   BILIRUBIN mg/dL <0.2 0.2 0.2   ALK PHOS U/L 177* 151* 127*   ALT (SGPT) U/L 9 10 9   AST (SGOT) U/L 9 11 13   GLUCOSE mg/dL 437* 293* 215*       Estimated Creatinine Clearance: 18.4 mL/min (A) (by C-G formula based on SCr of 4.08 mg/dL (H)).          Results from last 7 days   Lab Units 11/11/21  0600 11/10/21  0538 11/09/21  0608 11/08/21  1450   WBC 10*3/mm3 8.15 8.41 10.01 8.14   HEMOGLOBIN g/dL 8.2* 8.5* 8.4* 7.9*   PLATELETS 10*3/mm3 267 288 265 252             Lab Results   Component Value Date    CKTOTAL 43 06/27/2021    CKMB 0.93 08/26/2017    TROPONINI 0.01 08/21/2021    TROPONINT 0.021 11/08/2021     Lab Results   Component Value Date    PROBNP 1,386.0  (H) 11/08/2021       I/O last 3 completed shifts:  In: 480 [P.O.:480]  Out: -     Cardiographics  ECG/EMG Results (last 24 hours)       Procedure Component Value Units Date/Time    ECG 12 Lead [[id number]] Collected: 11/08/21 1434     Updated: 11/10/21 1639     QT Interval 496 ms      QTC Interval 550 ms     Narrative:      Test Reason : Chest Pain  Blood Pressure :   */*   mmHG  Vent. Rate :  74 BPM     Atrial Rate :  74 BPM     P-R Int : 176 ms          QRS Dur : 156 ms      QT Int : 496 ms       P-R-T Axes :  99  95 123 degrees     QTc Int : 550 ms    Normal sinus rhythm  Baseline Artifact  Right bundle branch block  Cannot rule out Inferior infarct , age undetermined  Abnormal ECG  When compared with ECG of 11-OCT-2021 23:56,  No significant change was found    Referred By: NAVEED           Confirmed By: VINNIE WELLER          Results for orders placed during the hospital encounter of 10/11/21    Adult Transthoracic Echo Limited W/ Cont if Necessary Per Protocol    Interpretation Summary  · The study is technically difficult for diagnosis with poor acoustic windows. The quality of the study is limited due to patient body habitus and lung disease. Verbal consent was obtained from the patient to use Definity image enhancer in order to optimize the study.  · Left ventricular wall thickness is consistent with mild concentric hypertrophy.  · Estimated left ventricular EF = 63% Left ventricular ejection fraction appears to be 61 - 65%. Left ventricular systolic function is normal.  · The right ventricular cavity is mildly dilated. LA mildly dilated        XR Chest 2 View    Result Date: 10/11/2021  Bilateral pulmonary edema, likely cardiogenic given cardiomegaly. Cannot exclude superimposed airspace disease/pneumonia in appropriate clinical context. Electronically signed by:  Kamar Khanna MD  10/11/2021 3:21 PM CDT Workstation: [id number]    US Guided Vascular Access    Result Date: 10/12/2021  Impression:  Successful uneventful US guided placement RightBasilic vein, for midline catheter placement. Electronically signed by:  Hugo Russo MD  10/12/2021 3:44 PM CDT Workstation: [id number]    XR Chest 1 View    Result Date: 11/8/2021  Interval development of right lower lobe infiltrative changes indicating pneumonitis. Electronically signed by:  Hugo Russo MD  11/8/2021 3:32 PM CST Workstation: [id number]    XR Chest 1 View    Result Date: 10/16/2021  CONCLUSION: Sternotomy. Stable cardiomegaly. Persistent minimal pulmonary vascular congestion and interstitial edema. Perhaps very small right pleural effusion. [id number] Electronically signed by:  Jon Patricia MD  10/16/2021 12:14 PM CDT Workstation: [id number]    XR Abdomen KUB    Result Date: 10/15/2021  As above. Electronically signed by:  Hugo Russo MD  10/15/2021 9:12 AM CDT Workstation: [id number]    XR Chest Post CVA Port    Result Date: 10/21/2021  Limited study technically. Right IJ catheter in satisfactory position. No pneumothorax. Possible mild fluid overload/CHF. Clinical correlation recommended. Electronically signed by:  Jesse Carr MD  10/21/2021 3:11 PM CDT Workstation: [id number]      Intake/Output         11/10/21 0700 - 11/11/21 0659 11/11/21 0700 - 11/12/21 0659    Intake (ml) 480 480    Output (ml) -- 1600    Net (ml) 480 -1120    Last Weight 129 kg (285 lb 3.2 oz) --              Assessment/Plan       Chest pain    COPD (chronic obstructive pulmonary disease) (HCC)    CKD (chronic kidney disease), symptom management only, stage 5 (HCC)    Acute on chronic congestive heart failure (HCC)    Anemia    Type 2 diabetes mellitus with hyperglycemia (HCC)    Pneumonitis    This is a 61-year-old  female with extensive medical history including hypertension, hyperlipidemia, obesity, iron deficiency anemia, CKD/end-stage renal disease on hemodialysis, MIKE on CPAP, COPD oxygen dependent, peripheral vascular disease, history of carotid endarterectomy on the  left who presented from rehab with chest pain.     She has an extensive cardiac history and underwent coronary artery bypass surgery in 2013.  She was evaluated for non-ST segment elevation myocardial infarction in April 2021.  Cardiac catheterization by Dr. Romo showed severe native vessel CAD with left main disease and patent LIMA to LAD but nonfunctioning SVG to RCA and ramus intermedius.     She was referred for redo CABG to [facility], but underwent PCI to discrete lesion of the left main coronary artery, proximal circumflex coronary artery and proximal SVG to ramus intermedius as described below,  Conclusions:   1. Left main: Discrete 70% distal stenosis.  2. LAD: Chronic total occlusion mid segment.  3. LCX: Severely calcified ostial 99% stenosis with BEATRIZ II flow  4. RCA: Chronic total occlusion mid segment  5.  Ramus: Severely calcified 80% mid vessel stenosis.  6.  SVG to ramus: 99% proximal stenosis.  Diffuse 80% distal stenosis.  7.  SVG to RCA: Occluded proximal anastomosis  8.  Successful PCI of the left main and proximal circumflex coronary artery with a 3.0 x 28 mm Xience Gloria drug-eluting stent postdilated to high pressure with a 3.5 mm NC trek balloon in the proximal segment  9.  PTCA of the native ramus with severe calcification and balloon rupture with noncompliant balloon.  Vessel too small for rotational atherectomy.  10.  Successful PCI of the proximal SVG to ramus with a 2.75 x 28 mm Xience Gloria drug-eluting stent.  PTCA of the distal anastomosis of the SVG to ramus with a 2.5 mm trek balloon.       This admission.  Troponins have been negative.  EKG is sinus rhythm with right bundle branch, negative for any acute findings.  Nuclear stress test was ordered showing GI artifact present, myocardial perfusion study indicates a small sized, mildly severe area of ischemia located in the basal inferior lateral wall.  Consistent with a intermediate risk study.  Last echo showing  preserved biventricular function.  In the setting of multiple cardiac risk factors, significant CAD history and abnormal nuclear stress test options were discussed with patient today including medication management vs ischemic evaluation with cardiac catheterization. Patient was agreeable to cardiac catheterization per Dr. Rios, with planning according with dialysis on Saturday. Patient will be NPO after midnight on Friday.     -For now we will continue with dual antiplatelet therapy with aspirin and Plavix  -Lipid-lowering therapy with atorvastatin 20 mg  -Antianginal therapy with Imdur 30 mg, ADD ranexa 500mg BID  -Metoprolol tartrate 50 mg twice daily, lisinopril 10 mg daily    Hyponatremia: Less than 130, per nephrology    ESRD on HD: She has been on hemodialysis since 10/21    Anemia/recent GI bleed: s/p capsule endoscopy which showed few small non-bleeding intestinal AVMs and single small polyp.  She is on B12 and folate supplementation    COPD: oxygen dependent on trilogy.      I discussed the patients findings and my recommendations with patient and Dr. Davis.              This document has been electronically signed by BRIDGETT Verduzco on November 11, 2021 13:49 CST     Electronically signed by BRIDGETT Verduzco, 11/11/21, 1:49 PM CST.    Patient examined and chart reviewed in detail.  Agree with assessment and plan as outlined by BRIDGETT Goodrich.     Yamileth Slater is a 61-year-old female with past medical history of hypertension, hyperlipidemia, obesity, IDDM, CKD, CAD s/p CABG and COPD (oxygen dependent), sleep apnea on CPAP, peripheral vascular disease with history of left carotid endarterectomy who was seen by me once in the office in August 2021.  She has an extensive cardiac history and was admitted for evaluation of intermittent chest pain while at the rehabilitation center.  The patient has had multiple hospital admissions for cellulitis, dyspnea, fluid overload, worsening  renal failure and was started on hemodialysis by Dr. Lauren.  Her chest pain is at times pressure-like and squeezing in character and at times sharp.  It is located in the right parasternal and retrosternal region.  She indicates that she has had intermittent chest pains even prior to placement of the dialysis catheter in the right subclavian.  Following admission, cardiac enzymes have been negative for myocardial injury.  Subsequent, Lexiscan Cardiolite stress test using a 2-day protocol showed a small area of ischemia in the basal lateral wall.    She has an extensive cardiac history and underwent coronary artery bypass surgery in 2013.  She presented with chest pain in April 2021 and ruled in for an NSTEMI and cardiac catheterization by Dr. Romo showed severe three-vessel native CAD with significant lesions in the little distal left main extending into the proximal circumflex coronary artery.  LIMA was patent and SVG to RCA was nonfunctional and SVG to ramus had significant proximal lesion.     She was referred for redo CABG to [facility], but underwent PCI to discrete lesion of the left main coronary artery, proximal circumflex coronary artery and proximal SVG to ramus intermedius as described below, 3 oh by 28 Xience Gloria stent was placed in the left main coronary artery extending into the proximal circumflex coronary artery and postdilated using a 3.5 NC trek balloon.  PCI to native ramus intermedius was not successful due to significant calcification and balloon rupture.  Successful PCI to proximal SVG to ramus intermedius with placement of a 2.75 x 28 mm Xience Gloria stent.  And PTCA to the distal anastomosis of SVG to ramus with a 2.5 mm Trek balloon     On examination: Patient was pain-free though she did complain of intermittent squeezing chest discomfort from time to time  Pulse rate was 68 bpm regular.  Blood pressure was 130/60 mmHg.  There is mild pallor with no icterus or  cyanosis.  Patient is morbidly obese.  Exam of the heart showed normal first and second heart sounds with no S3 or S4.  No murmurs audible.  There was increased AP diameter of the chest.  Breath sounds diminished in both lung bases.  No rales.  Abdomen was obese with no definite mass or tenderness.  Extremities showed no edema.    Patient's EKG, labs, reviewed in detail.    Treatment options discussed in detail with the patient and both maximal medical management and invasive work-up were discussed.  Patient has opted to consider cardiac catheterization.  As described above, risks and benefits were explained to her in detail and she will be ASA 3 and Mallampati 3.  The patient is scheduled for dialysis on Saturday and I have discussed her case with Dr. Rios who will be performing the cardiac catheterization.  Agree with current management and addition of Ranexa 5 mg twice a day in addition to antianginal therapy with isosorbide mononitrate, beta blockade with metoprolol tartrate and lipid lowering therapy with atorvastatin.  Antiplatelet therapy with aspirin and Plavix have been continued.      Electronically signed by Margarito Davis MD, 11/11/21, 4:44 PM CST.

## 2021-11-12 LAB
ALBUMIN SERPL-MCNC: 3.4 G/DL (ref 3.5–5.2)
ALBUMIN/GLOB SERPL: 0.7 G/DL
ALP SERPL-CCNC: 145 U/L (ref 39–117)
ALT SERPL W P-5'-P-CCNC: 8 U/L (ref 1–33)
ANION GAP SERPL CALCULATED.3IONS-SCNC: 15 MMOL/L (ref 5–15)
AST SERPL-CCNC: 11 U/L (ref 1–32)
BASOPHILS # BLD AUTO: 0.08 10*3/MM3 (ref 0–0.2)
BASOPHILS NFR BLD AUTO: 1.1 % (ref 0–1.5)
BILIRUB SERPL-MCNC: <0.2 MG/DL (ref 0–1.2)
BUN SERPL-MCNC: 44 MG/DL (ref 8–23)
BUN/CREAT SERPL: 12.6 (ref 7–25)
CALCIUM SPEC-SCNC: 8.9 MG/DL (ref 8.6–10.5)
CHLORIDE SERPL-SCNC: 92 MMOL/L (ref 98–107)
CO2 SERPL-SCNC: 20 MMOL/L (ref 22–29)
CREAT SERPL-MCNC: 3.49 MG/DL (ref 0.57–1)
DEPRECATED RDW RBC AUTO: 44 FL (ref 37–54)
EOSINOPHIL # BLD AUTO: 0.27 10*3/MM3 (ref 0–0.4)
EOSINOPHIL NFR BLD AUTO: 3.8 % (ref 0.3–6.2)
ERYTHROCYTE [DISTWIDTH] IN BLOOD BY AUTOMATED COUNT: 15.2 % (ref 12.3–15.4)
GFR SERPL CREATININE-BSD FRML MDRD: 13 ML/MIN/1.73
GFR SERPL CREATININE-BSD FRML MDRD: ABNORMAL ML/MIN/{1.73_M2}
GLOBULIN UR ELPH-MCNC: 4.6 GM/DL
GLUCOSE BLDC GLUCOMTR-MCNC: 253 MG/DL (ref 70–130)
GLUCOSE BLDC GLUCOMTR-MCNC: 395 MG/DL (ref 70–130)
GLUCOSE SERPL-MCNC: 391 MG/DL (ref 65–99)
HCT VFR BLD AUTO: 25 % (ref 34–46.6)
HGB BLD-MCNC: 8.4 G/DL (ref 12–15.9)
IMM GRANULOCYTES # BLD AUTO: 0.08 10*3/MM3 (ref 0–0.05)
IMM GRANULOCYTES NFR BLD AUTO: 1.1 % (ref 0–0.5)
LYMPHOCYTES # BLD AUTO: 1.63 10*3/MM3 (ref 0.7–3.1)
LYMPHOCYTES NFR BLD AUTO: 23.1 % (ref 19.6–45.3)
MCH RBC QN AUTO: 27.5 PG (ref 26.6–33)
MCHC RBC AUTO-ENTMCNC: 33.6 G/DL (ref 31.5–35.7)
MCV RBC AUTO: 81.7 FL (ref 79–97)
MONOCYTES # BLD AUTO: 0.56 10*3/MM3 (ref 0.1–0.9)
MONOCYTES NFR BLD AUTO: 7.9 % (ref 5–12)
NEUTROPHILS NFR BLD AUTO: 4.44 10*3/MM3 (ref 1.7–7)
NEUTROPHILS NFR BLD AUTO: 63 % (ref 42.7–76)
NRBC BLD AUTO-RTO: 0 /100 WBC (ref 0–0.2)
PLATELET # BLD AUTO: 265 10*3/MM3 (ref 140–450)
PMV BLD AUTO: 8.7 FL (ref 6–12)
POTASSIUM SERPL-SCNC: 4.9 MMOL/L (ref 3.5–5.2)
PROT SERPL-MCNC: 8 G/DL (ref 6–8.5)
RBC # BLD AUTO: 3.06 10*6/MM3 (ref 3.77–5.28)
SODIUM SERPL-SCNC: 127 MMOL/L (ref 136–145)
WBC # BLD AUTO: 7.06 10*3/MM3 (ref 3.4–10.8)

## 2021-11-12 PROCEDURE — 94799 UNLISTED PULMONARY SVC/PX: CPT

## 2021-11-12 PROCEDURE — 82962 GLUCOSE BLOOD TEST: CPT

## 2021-11-12 PROCEDURE — 63710000001 INSULIN ASPART PER 5 UNITS: Performed by: STUDENT IN AN ORGANIZED HEALTH CARE EDUCATION/TRAINING PROGRAM

## 2021-11-12 PROCEDURE — 80053 COMPREHEN METABOLIC PANEL: CPT | Performed by: STUDENT IN AN ORGANIZED HEALTH CARE EDUCATION/TRAINING PROGRAM

## 2021-11-12 PROCEDURE — 63710000001 INSULIN DETEMIR PER 5 UNITS: Performed by: INTERNAL MEDICINE

## 2021-11-12 PROCEDURE — 63710000001 INSULIN ASPART PER 5 UNITS: Performed by: INTERNAL MEDICINE

## 2021-11-12 PROCEDURE — 25010000002 MORPHINE PER 10 MG: Performed by: STUDENT IN AN ORGANIZED HEALTH CARE EDUCATION/TRAINING PROGRAM

## 2021-11-12 PROCEDURE — 25010000002 HEPARIN (PORCINE) PER 1000 UNITS: Performed by: STUDENT IN AN ORGANIZED HEALTH CARE EDUCATION/TRAINING PROGRAM

## 2021-11-12 PROCEDURE — 97162 PT EVAL MOD COMPLEX 30 MIN: CPT

## 2021-11-12 PROCEDURE — 97166 OT EVAL MOD COMPLEX 45 MIN: CPT

## 2021-11-12 PROCEDURE — 85025 COMPLETE CBC W/AUTO DIFF WBC: CPT | Performed by: STUDENT IN AN ORGANIZED HEALTH CARE EDUCATION/TRAINING PROGRAM

## 2021-11-12 PROCEDURE — 94760 N-INVAS EAR/PLS OXIMETRY 1: CPT

## 2021-11-12 RX ADMIN — INSULIN ASPART 12 UNITS: 100 INJECTION, SOLUTION INTRAVENOUS; SUBCUTANEOUS at 17:47

## 2021-11-12 RX ADMIN — ATORVASTATIN CALCIUM 20 MG: 20 TABLET, FILM COATED ORAL at 08:31

## 2021-11-12 RX ADMIN — PANTOPRAZOLE SODIUM 40 MG: 40 TABLET, DELAYED RELEASE ORAL at 21:23

## 2021-11-12 RX ADMIN — INSULIN ASPART 34 UNITS: 100 INJECTION, SOLUTION INTRAVENOUS; SUBCUTANEOUS at 11:11

## 2021-11-12 RX ADMIN — RANOLAZINE 500 MG: 500 TABLET, FILM COATED, EXTENDED RELEASE ORAL at 21:23

## 2021-11-12 RX ADMIN — RANOLAZINE 500 MG: 500 TABLET, FILM COATED, EXTENDED RELEASE ORAL at 08:30

## 2021-11-12 RX ADMIN — BUMETANIDE 2 MG: 1 TABLET ORAL at 08:30

## 2021-11-12 RX ADMIN — GABAPENTIN 400 MG: 400 CAPSULE ORAL at 21:23

## 2021-11-12 RX ADMIN — FERROUS SULFATE TAB EC 324 MG (65 MG FE EQUIVALENT) 324 MG: 324 (65 FE) TABLET DELAYED RESPONSE at 08:31

## 2021-11-12 RX ADMIN — GABAPENTIN 400 MG: 400 CAPSULE ORAL at 08:30

## 2021-11-12 RX ADMIN — LISINOPRIL 10 MG: 10 TABLET ORAL at 08:30

## 2021-11-12 RX ADMIN — NYSTATIN: 100000 POWDER TOPICAL at 08:30

## 2021-11-12 RX ADMIN — ASPIRIN 81 MG: 81 TABLET, FILM COATED ORAL at 08:30

## 2021-11-12 RX ADMIN — NYSTATIN: 100000 POWDER TOPICAL at 15:57

## 2021-11-12 RX ADMIN — INSULIN DETEMIR 35 UNITS: 100 INJECTION, SOLUTION SUBCUTANEOUS at 13:39

## 2021-11-12 RX ADMIN — SODIUM CHLORIDE, PRESERVATIVE FREE 10 ML: 5 INJECTION INTRAVENOUS at 21:27

## 2021-11-12 RX ADMIN — CETIRIZINE HYDROCHLORIDE 10 MG: 10 TABLET, FILM COATED ORAL at 08:30

## 2021-11-12 RX ADMIN — METOPROLOL TARTRATE 50 MG: 50 TABLET, FILM COATED ORAL at 21:24

## 2021-11-12 RX ADMIN — OXYBUTYNIN CHLORIDE 5 MG: 5 TABLET, EXTENDED RELEASE ORAL at 08:30

## 2021-11-12 RX ADMIN — DULOXETINE HYDROCHLORIDE 20 MG: 20 CAPSULE, DELAYED RELEASE ORAL at 08:30

## 2021-11-12 RX ADMIN — ISOSORBIDE MONONITRATE 30 MG: 30 TABLET, EXTENDED RELEASE ORAL at 06:23

## 2021-11-12 RX ADMIN — HEPARIN SODIUM 5000 UNITS: 5000 INJECTION INTRAVENOUS; SUBCUTANEOUS at 21:24

## 2021-11-12 RX ADMIN — METOPROLOL TARTRATE 50 MG: 50 TABLET, FILM COATED ORAL at 08:30

## 2021-11-12 RX ADMIN — INSULIN ASPART 30 UNITS: 100 INJECTION, SOLUTION INTRAVENOUS; SUBCUTANEOUS at 08:29

## 2021-11-12 RX ADMIN — LIDOCAINE 1 PATCH: 700 PATCH TOPICAL at 08:31

## 2021-11-12 RX ADMIN — IPRATROPIUM BROMIDE AND ALBUTEROL SULFATE 3 ML: 2.5; .5 SOLUTION RESPIRATORY (INHALATION) at 07:28

## 2021-11-12 RX ADMIN — SODIUM CHLORIDE, PRESERVATIVE FREE 10 ML: 5 INJECTION INTRAVENOUS at 08:34

## 2021-11-12 RX ADMIN — IPRATROPIUM BROMIDE AND ALBUTEROL SULFATE 3 ML: 2.5; .5 SOLUTION RESPIRATORY (INHALATION) at 15:59

## 2021-11-12 RX ADMIN — LEVOTHYROXINE SODIUM 25 MCG: 25 TABLET ORAL at 08:31

## 2021-11-12 RX ADMIN — INSULIN DETEMIR 35 UNITS: 100 INJECTION, SOLUTION SUBCUTANEOUS at 21:40

## 2021-11-12 RX ADMIN — INSULIN ASPART 7 UNITS: 100 INJECTION, SOLUTION INTRAVENOUS; SUBCUTANEOUS at 11:10

## 2021-11-12 RX ADMIN — INSULIN ASPART 6 UNITS: 100 INJECTION, SOLUTION INTRAVENOUS; SUBCUTANEOUS at 08:29

## 2021-11-12 RX ADMIN — MORPHINE SULFATE 1 MG: 2 INJECTION, SOLUTION INTRAMUSCULAR; INTRAVENOUS at 21:24

## 2021-11-12 RX ADMIN — MONTELUKAST 10 MG: 10 TABLET, FILM COATED ORAL at 21:24

## 2021-11-12 RX ADMIN — CLOPIDOGREL BISULFATE 75 MG: 75 TABLET ORAL at 08:30

## 2021-11-12 RX ADMIN — IPRATROPIUM BROMIDE AND ALBUTEROL SULFATE 3 ML: 2.5; .5 SOLUTION RESPIRATORY (INHALATION) at 19:31

## 2021-11-12 RX ADMIN — GABAPENTIN 400 MG: 400 CAPSULE ORAL at 15:57

## 2021-11-12 RX ADMIN — DOCUSATE SODIUM 50 MG AND SENNOSIDES 8.6 MG 2 TABLET: 8.6; 5 TABLET, FILM COATED ORAL at 08:30

## 2021-11-12 RX ADMIN — HEPARIN SODIUM 5000 UNITS: 5000 INJECTION INTRAVENOUS; SUBCUTANEOUS at 08:30

## 2021-11-12 RX ADMIN — INSULIN ASPART 9 UNITS: 100 INJECTION, SOLUTION INTRAVENOUS; SUBCUTANEOUS at 17:46

## 2021-11-12 RX ADMIN — INSULIN ASPART 12 UNITS: 100 INJECTION, SOLUTION INTRAVENOUS; SUBCUTANEOUS at 21:39

## 2021-11-12 RX ADMIN — IPRATROPIUM BROMIDE AND ALBUTEROL SULFATE 3 ML: 2.5; .5 SOLUTION RESPIRATORY (INHALATION) at 11:23

## 2021-11-12 RX ADMIN — MORPHINE SULFATE 1 MG: 2 INJECTION, SOLUTION INTRAMUSCULAR; INTRAVENOUS at 13:44

## 2021-11-12 NOTE — PLAN OF CARE
Goal Outcome Evaluation:  Plan of Care Reviewed With: patient           Outcome Summary: initial OT evaluation complete. co-eval with PT. pt was pleasant and cooperative throughout. has been at [facility] for therapy since recent discharge. plans to return there at discharge. BUE ROM WFL grossly. BUE strength and bilateral  strength are grossly 4+/5. pt was supervision for bed mobility. was able to slip slippers on and off independently sitting at EOB. CGA for transfers and functional mobility with no AD. returned to bed at end of session with all needs in reach. recommend further skilled OT services to address functional mobility and ADL deficits, as well as balance, strength, and endurance. goals established.

## 2021-11-12 NOTE — PLAN OF CARE
Pts FSBS has been elevated. Medications adjusted per MD. Pt has been in pain; medicated with PRN morphine. Dialysis scheduled for 11/13. Will continue to monitor.    Goal Outcome Evaluation:

## 2021-11-12 NOTE — CONSULTS
Adult Nutrition  Assessment    Patient Name:  Yamileth Slater  YOB: 1960  MRN: [id number]  Admit Date:  11/8/2021    Assessment Date:  11/12/2021    Comments:  Pt is in Contact isolation due to CRE.  Pt with dx heart Failure.  Pt receptive to Low Sodium Diet ed.  Mild Sodium Restricted Diet info used to provide Low Sodium Diet ed and diet copy given.  Pt will need Wt Loss Diet ed prior to discharge.  Supper tray had just arrived to Wt Loss Diet ed not appropriate at this time.         Reason for Assessment     Row Name 11/12/21 1722          Reason for Assessment    Reason For Assessment per organizational policy  Low Sodium Diet ed     Diagnosis cardiac disease  dx Heart Failure     Identified At Risk by Screening Criteria need for education                                   Electronically signed by:  Ratna Tomlin RD  11/12/21 17:35 CST

## 2021-11-12 NOTE — THERAPY EVALUATION
Patient Name: Yamileth Slater  : 1960    MRN: [id number]                              Today's Date: 2021       Admit Date: 2021    Visit Dx:     ICD-10-CM ICD-9-CM   1. Chest pain, unspecified type  R07.9 786.50   2. Hyperglycemia  R73.9 790.29   3. Impaired mobility and ADLs  Z74.09 V49.89    Z78.9    4. Impaired functional mobility, balance, gait, and endurance  Z74.09 V49.89     Patient Active Problem List   Diagnosis   • Type 2 diabetes mellitus with diabetic polyneuropathy, with long-term current use of insulin ([facility])   • Chronic obstructive pulmonary disease ([facility])   • Chronic midline low back pain without sciatica   • Vitamin D deficiency   • Type 2 diabetes mellitus ([facility])   • Tobacco dependence syndrome   • Surgical follow-up care   • Shoulder joint pain   • Peripheral vascular disease ([facility])   • Pain   • Neurologic disorder associated with diabetes mellitus ([facility])   • Morbid obesity with BMI of 50.0-59.9, adult ([facility])   • Mixed hyperlipidemia   • Kidney stone   • History of colon polyps   • GERD (gastroesophageal reflux disease)   • Excoriated eczema   • Essential hypertension   • Encounter for medication refill   • Dyslipidemia   • Diabetes mellitus ([facility])   • Coronary artery disease   • COPD (chronic obstructive pulmonary disease) ([facility])   • Chronic folliculitis   • Chest pain   • Mild persistent asthma   • 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   • Uncontrolled type 2 diabetes mellitus with complication, with long-term current use of insulin ([facility])   • Hypothyroidism   • Carotid artery disease ([facility])   • Current smoker   • DM type 2 with diabetic peripheral neuropathy ([facility])   • [place] abuse   • PAD (peripheral artery disease) ([facility])   • S/P CABG x 3   • Intercostal pain   • Venous insufficiency   • Chronic respiratory failure with hypoxia ([facility])   • LAFB (left anterior fascicular block)   • Pulmonary hypertension ([facility])   • Left hip pain   • Neck pain   • CKD (chronic  kidney disease), symptom management only, stage 5 ([facility])   • MIKE and COPD overlap syndrome ([facility])   • Pneumonia due to COVID-19 virus   • Morbid obesity ([facility])   • Type 2 diabetes mellitus with diabetic nephropathy, with long-term current use of insulin ([facility])   • Hyponatremia   • Hyperkalemia   • Anemia   • Cutaneous candidiasis   • Community acquired pneumonia of right middle lobe of lung   • MRSA infection   • Alkaline phosphatase elevation   • COVID-19 ruled out by laboratory testing   • Esophageal dysphagia   • Monilial esophagitis ([facility])   • NSTEMI, initial episode of care ([facility])   • CAD (coronary artery disease)   • Chronic respiratory failure with hypoxia ([facility])   • Pneumonia due to infectious organism   • Chronic hypercapnic respiratory failure ([facility])   • Cellulitis of left leg   • Acute on chronic congestive heart failure ([facility])   • Hyponatremia   • Urinary tract infection due to Proteus   • History of insertion of tunneled central venous catheter (CVC) with port   • Anemia due to chronic kidney disease, on chronic dialysis ([facility])   • Anemia   • Type 2 diabetes mellitus with hyperglycemia ([facility])   • Pneumonitis     Past Medical History:   Diagnosis Date   • Acute blood loss anemia 4/16/2017    Likely due to gastric oozing at this time. - Dr. Duarte (GI) was consulted and has now signed off, will follow up outpatient - pill colonoscopy showed AVMs - continue to monitor   • Anxiety    • Carotid artery stenosis    • Chronic obstructive lung disease ([facility])    • CKD (chronic kidney disease) stage 4, GFR 15-29 ml/min ([facility])    • Colonic polyp    • Coronary arteriosclerosis    • Diabetes mellitus ([facility])    • Diabetic neuropathy ([facility])    • Ear pain, right 10/18/2021    - canal trauma due to patient scratching and DMT2 - added cortisporin ear drops   • Elevated troponin 10/12/2021    -most likely from CKD -Trending down -Neg chest pain   • GERD (gastroesophageal reflux disease)    • GI bleed 5/13/2021    - GI will follow up  outpatient - Protonix 40mg daily - Avoid medical DVT prophy and use mechanical at this time instead. - Continue to monitor - pill colonoscopy results showed AVMs   • History of transfusion    • Hypercholesterolemia    • Hypertension    • Hypomagnesemia 6/27/2021    Monitor and replace   • Morbid obesity ([facility])    • Nephrolithiasis    • Peripheral vascular disease ([facility])    • Sleep apnea    • Substance abuse ([facility])    • Vitamin D deficiency      Past Surgical History:   Procedure Laterality Date   • CARDIAC CATHETERIZATION N/A 7/14/2020   • CARDIAC CATHETERIZATION N/A 4/23/2021    Procedure: Left Heart Cath;  Surgeon: Melba Romo MD;  Location: [facility] CATH INVASIVE LOCATION;  Service: Cardiology;  Laterality: N/A;   • CARDIAC CATHETERIZATION N/A 4/30/2021    Procedure: Percutaneous Coronary Intervention;  Surgeon: Russell Voss MD;  Location: [facility] CATH INVASIVE LOCATION;  Service: Cardiovascular;  Laterality: N/A;   • CARDIAC CATHETERIZATION N/A 4/30/2021    Procedure: Stent NIKKI coronary;  Surgeon: Russell Voss MD;  Location: [facility] INVASIVE LOCATION;  Service: Cardiovascular;  Laterality: N/A;   • CAROTID STENT Left    • COLONOSCOPY     • COLONOSCOPY N/A 5/14/2021    Procedure: COLONOSCOPY;  Surgeon: Mingo Duarte MD;  Location: [facility] ENDOSCOPY;  Service: Gastroenterology;  Laterality: N/A;   • CORONARY ARTERY BYPASS GRAFT N/A 2013    CABG X 3   • CYSTOSCOPY BLADDER STONE LITHOTRIPSY Bilateral    • ENDOSCOPY N/A 4/12/2021    Procedure: ESOPHAGOGASTRODUODENOSCOPY;  Surgeon: Mingo Duarte MD;  Location: [facility] ENDOSCOPY;  Service: Gastroenterology;  Laterality: N/A;   • ENDOSCOPY N/A 5/14/2021    Procedure: ESOPHAGOGASTRODUODENOSCOPY;  Surgeon: Mingo Duarte MD;  Location: [facility] ENDOSCOPY;  Service: Gastroenterology;  Laterality: N/A;   • INTERVENTIONAL RADIOLOGY PROCEDURE N/A 10/21/2021    Procedure: tunneled central venous catheter placement;  Surgeon: Margaret  Donnie Parson MD;  Location: [facility] INVASIVE LOCATION;  Service: Interventional Radiology;  Laterality: N/A;      General Information     Row Name 11/12/21 0836          Physical Therapy Time and Intention    Document Type evaluation  -KW     Mode of Treatment co-treatment; physical therapy; occupational therapy  -KW     Row Name 11/12/21 0836          General Information    Patient Profile Reviewed yes  -KW     Prior Level of Function min assist:; ADL's; independent:; gait; transfer  -KW     Existing Precautions/Restrictions fall  -KW     Barriers to Rehab previous functional deficit  -KW     Row Name 11/12/21 0836          Living Environment    Lives With facility resident  for rehab  -KW     Row Name 11/12/21 0836          Home Main Entrance    Number of Stairs, Main Entrance none  -KW     Row Name 11/12/21 0836          Stairs Within Home, Primary    Stairs, Within Home, Primary from [facility]; planning to return for further therapy  -KW     Row Name 11/12/21 0836          Cognition    Orientation Status (Cognition) oriented x 4  -KW     Row Name 11/12/21 0836          Safety Issues, Functional Mobility    Safety Issues Affecting Function (Mobility) safety precaution awareness; safety precautions follow-through/compliance; awareness of need for assistance  -KW     Impairments Affecting Function (Mobility) balance; endurance/activity tolerance; strength  -KW           User Key  (r) = Recorded By, (t) = Taken By, (c) = Cosigned By    Initials Name Provider Type    KW Hilaria Jerez, PT Physical Therapist               Mobility     Row Name 11/12/21 0800          Bed Mobility    Bed Mobility supine-sit; sit-supine  -KW     Supine-Sit [place] (Bed Mobility) supervision  -KW     Sit-Supine [place] (Bed Mobility) supervision  -KW     Assistive Device (Bed Mobility) bed rails; head of bed elevated  -KW     Row Name 11/12/21 0843          Sit-Stand Transfer    Sit-Stand [place] (Transfers)  contact guard  -KW     Row Name 11/12/21 0843          Gait/Stairs (Locomotion)    [place] Level (Gait) contact guard  -KW     Distance in Feet (Gait) 80ft; no AD but with 1 minor LOB  -KW     Deviations/Abnormal Patterns (Gait) base of support, wide; stride length decreased; gait speed decreased  -KW           User Key  (r) = Recorded By, (t) = Taken By, (c) = Cosigned By    Initials Name Provider Type    KW Hilaria Jerez, STEFFANIE Physical Therapist               Obj/Interventions     Row Name 11/12/21 0901          Range of Motion Comprehensive    Comment, General Range of Motion BLE AROM WFL  -KW     Row Name 11/12/21 0901          Strength Comprehensive (MMT)    Comment, General Manual Muscle Testing (MMT) Assessment BLE grossly 4+/5  -KW     Row Name 11/12/21 0901          Balance    Balance Assessment sitting static balance  -KW     Static Sitting Balance WNL; unsupported; sitting, edge of bed  -KW     Row Name 11/12/21 0901          Sensory Assessment (Somatosensory)    Sensory Assessment (Somatosensory) LE sensation intact  BLE light touch assessed; with exception of B toes; numbness present  -KW           User Key  (r) = Recorded By, (t) = Taken By, (c) = Cosigned By    Initials Name Provider Type    KW Hilaria Jerez, PT Physical Therapist               Goals/Plan     Row Name 11/12/21 0845          Bed Mobility Goal 1 (PT)    Activity/Assistive Device (Bed Mobility Goal 1, PT) sit to supine; supine to sit  -KW     [place] Level/Cues Needed (Bed Mobility Goal 1, PT) independent  -KW     Time Frame (Bed Mobility Goal 1, PT) 2 days  -KW     Progress/Outcomes (Bed Mobility Goal 1, PT) goal not met  -KW     Row Name 11/12/21 0845          Transfer Goal 1 (PT)    Activity/Assistive Device (Transfer Goal 1, PT) sit-to-stand/stand-to-sit; bed-to-chair/chair-to-bed  -KW     [place] Level/Cues Needed (Transfer Goal 1, PT) standby assist  -KW     Time Frame (Transfer Goal 1, PT) 3 days  -KW      Progress/Outcome (Transfer Goal 1, PT) goal not met  -KW     Row Name 11/12/21 0845          Gait Training Goal 1 (PT)    Activity/Assistive Device (Gait Training Goal 1, PT) gait (walking locomotion); assistive device use; decrease fall risk; increase endurance/gait distance  -KW     [place] Level (Gait Training Goal 1, PT) modified independence  -KW     Distance (Gait Training Goal 1, PT) 150ft or more  -KW     Time Frame (Gait Training Goal 1, PT) by discharge  -KW     Progress/Outcome (Gait Training Goal 1, PT) goal not met  -KW           User Key  (r) = Recorded By, (t) = Taken By, (c) = Cosigned By    Initials Name Provider Type    KW Hilaria Jerez, PT Physical Therapist               Clinical Impression     Row Name 11/12/21 0843          Pain    Additional Documentation Pain Scale: Numbers Pre/Post-Treatment (Group)  -KW     [facility] Name 11/12/21 0843          Pain Scale: Numbers Pre/Post-Treatment    Pretreatment Pain Rating 0/10 - no pain  -KW     Posttreatment Pain Rating 0/10 - no pain  -KW     Row Name 11/12/21 0843          Therapy Assessment/Plan (PT)    Rehab Potential (PT) good, to achieve stated therapy goals  -KW     Criteria for Skilled Interventions Met (PT) yes; meets criteria  -KW     Predicted Duration of Therapy Intervention (PT) until goals met or d/c from acute care  -KW     Row Name 11/12/21 0843          Vital Signs    Pre Systolic BP Rehab 126  -KW     Pre Treatment Diastolic BP 58  -KW     Pretreatment Heart Rate (beats/min) 71  -KW     Pre SpO2 (%) 97  -KW     O2 Delivery Pre Treatment supplemental O2  -KW     Pre Patient Position Supine  -KW     Post Patient Position Supine  -KW     Row Name 11/12/21 0843          Positioning and Restraints    Pre-Treatment Position in bed  -KW     Post Treatment Position bed  -KW     In Bed fowlers; call light within reach; encouraged to call for assist; exit alarm on; side rails up x2  -KW           User Key  (r) = Recorded By, (t) = Taken By, (c)  = Cosigned By    Initials Name Provider Type    Hilaria Roldan, PT Physical Therapist               Outcome Measures     Row Name 11/12/21 0903          How much help from another person do you currently need...    Turning from your back to your side while in flat bed without using bedrails? 4  -KW     Moving from lying on back to sitting on the side of a flat bed without bedrails? 4  -KW     Moving to and from a bed to a chair (including a wheelchair)? 3  -KW     Standing up from a chair using your arms (e.g., wheelchair, bedside chair)? 3  -KW     Climbing 3-5 steps with a railing? 3  -KW     To walk in hospital room? 3  -KW     AM-PAC 6 Clicks Score (PT) 20  -KW     Row Name 11/12/21 0903 11/12/21 0825       Functional Assessment    Outcome Measure Options AM-PAC 6 Clicks Basic Mobility (PT)  -KW AM-PAC 6 Clicks Daily Activity (OT)  -ME          User Key  (r) = Recorded By, (t) = Taken By, (c) = Cosigned By    Initials Name Provider Type    Hilaria Roldan PT Physical Therapist    Nikole Crawford, OTR/L Occupational Therapist                             Physical Therapy Education                 Title: PT OT SLP Therapies (In Progress)     Topic: Physical Therapy (In Progress)     Point: Mobility training (Done)     Learning Progress Summary           Patient Acceptance, E, VU by  at 11/12/2021 0903    Comment: Role of PT, POC, use of gait belt                   Point: Home exercise program (Not Started)     Learner Progress:  Not documented in this visit.          Point: Body mechanics (Not Started)     Learner Progress:  Not documented in this visit.          Point: Precautions (Done)     Learning Progress Summary           Patient Acceptance, E, VU by  at 11/12/2021 0903    Comment: Role of PT, POC, use of gait belt                               User Key     Initials Effective Dates Name Provider Type Discipline     06/16/21 -  Hilaria Jerez PT Physical Therapist PT              PT Recommendation  and Plan  Planned Therapy Interventions (PT): balance training, bed mobility training, gait training, home exercise program, transfer training, stretching, patient/family education, strengthening, stair training  Plan of Care Reviewed With: patient  Outcome Summary: PT evaluation completed this date as co-eval with OT. Pt pleasant and agreeable to eval. Pt performing sit<>supine with supervision and sitting EOB WFL. Pt performing sit<>stand with CGA and ambulating 80ft with CGA and no AD. Pt with 1 LOB requiring Carl to correct. Pt would benefit from further skilled PT to increase functional mobility, endurance, strength, safety, balance, and to progress towards PLOF. Upon d/c from acute care, recommend return to SNF for further therapy prior to home.     Time Calculation:    PT Charges     Row Name 11/12/21 0905             Time Calculation    Start Time 0825  -KW      Stop Time 0903  -KW      Time Calculation (min) 38 min  -KW      PT Received On 11/12/21  -KW      PT Goal Re-Cert Due Date 11/25/21  -KW            User Key  (r) = Recorded By, (t) = Taken By, (c) = Cosigned By    Initials Name Provider Type    Hilaria Roldan, STEFFANIE Physical Therapist              Therapy Charges for Today     Code Description Service Date Service Provider Modifiers Qty    [id number] HC PT EVAL MOD COMPLEXITY 3 11/12/2021 Hilaria Jerez, STEFFANIE GP 1          PT G-Codes  Outcome Measure Options: AM-PAC 6 Clicks Basic Mobility (PT)  AM-PAC 6 Clicks Score (PT): 20  AM-PAC 6 Clicks Score (OT): 20    Hilaria Jerez PT  11/12/2021

## 2021-11-12 NOTE — PROGRESS NOTES
"[facility]  [address] [place] [address]. [address]  T - [phone]  F - [phone]     Progress Note          PATIENT  DEMOGRAPHICS   PATIENT NAME: Yamileth Slater                      PHYSICIAN: Ace Lauren MD  : 1960  MRN: [id number]   LOS: 0 days    Patient Care Team:  Rianna Macias MD as PCP - General (Family Medicine)  Subjective   SUBJECTIVE   bp stable   No chest pain          Objective   OBJECTIVE   Vital Signs  Temp:  [96.8 °F (36 °C)-97.3 °F (36.3 °C)] 97 °F (36.1 °C)  Heart Rate:  [] 66  Resp:  [16-18] 16  BP: ()/(52-67) 133/60    Flowsheet Rows      First Filed Value   Admission Height 157.5 cm (62\") Documented at 2021 1434   Admission Weight 128 kg (282 lb) Documented at 2021 1434           I/O last 3 completed shifts:  In: 960 [P.O.:960]  Out: 2200 [Urine:600; Other:1600]    PHYSICAL EXAM    Physical Exam  Vitals reviewed.   Constitutional:       Appearance: Normal appearance.   HENT:      Nose: Nose normal.   Cardiovascular:      Rate and Rhythm: Normal rate and regular rhythm.      Pulses: Normal pulses.      Heart sounds: Normal heart sounds. No murmur heard.      Pulmonary:      Effort: Pulmonary effort is normal.      Breath sounds: No wheezing or rales.   Abdominal:      General: Abdomen is flat. There is no distension.      Palpations: Abdomen is soft.      Tenderness: There is no abdominal tenderness.   Musculoskeletal:         General: No swelling.   Skin:     Coloration: Skin is not jaundiced.      Findings: No erythema.   Neurological:      General: No focal deficit present.      Mental Status: She is alert. She is disoriented.         RESULTS   Results Review:    Results from last 7 days   Lab Units 21  0531 21  0600 11/10/21  0538   SODIUM mmol/L 127* 125* 127*   POTASSIUM mmol/L 4.9 4.6 4.5   CHLORIDE mmol/L 92* 88* 90*   CO2 mmol/L 20.0* 21.0* 23.0   BUN mg/dL 44* 56* 47*   CREATININE mg/dL 3.49* 4.08* " 3.49*   CALCIUM mg/dL 8.9 8.8 8.9   BILIRUBIN mg/dL <0.2 <0.2 0.2   ALK PHOS U/L 145* 177* 151*   ALT (SGPT) U/L 8 9 10   AST (SGOT) U/L 11 9 11   GLUCOSE mg/dL 391* 437* 293*       Estimated Creatinine Clearance: 21.6 mL/min (A) (by C-G formula based on SCr of 3.49 mg/dL (H)).                Results from last 7 days   Lab Units 11/12/21  0531 11/11/21  0600 11/10/21  0538 11/09/21  0608 11/08/21  1450   WBC 10*3/mm3 7.06 8.15 8.41 10.01 8.14   HEMOGLOBIN g/dL 8.4* 8.2* 8.5* 8.4* 7.9*   PLATELETS 10*3/mm3 265 267 288 265 252               Imaging Results (Last 24 Hours)     ** No results found for the last 24 hours. **           MEDICATIONS    aspirin, 81 mg, Oral, Daily  atorvastatin, 20 mg, Oral, Daily  bumetanide, 2 mg, Oral, Once per day on Sun Mon Wed Fri  cetirizine, 10 mg, Oral, Daily  clopidogrel, 75 mg, Oral, Daily  DULoxetine, 20 mg, Oral, Daily  ferrous sulfate, 324 mg, Oral, Daily With Breakfast  fluticasone, 2 spray, Each Nare, Daily  gabapentin, 400 mg, Oral, TID  heparin (porcine), 5,000 Units, Subcutaneous, Q12H  insulin aspart, 3-15 Units, Subcutaneous, 4x Daily AC & at Bedtime  [START ON 11/13/2021] insulin aspart, 3-15 Units, Subcutaneous, Q24H  insulin aspart, 6-30 Units, Subcutaneous, TID With Meals  insulin detemir, 35 Units, Subcutaneous, Q12H  ipratropium-albuterol, 3 mL, Nebulization, 4x Daily - RT  isosorbide mononitrate, 30 mg, Oral, QAM  levothyroxine, 25 mcg, Oral, Daily  lidocaine, 1 patch, Transdermal, Q24H  lisinopril, 10 mg, Oral, Daily  metoprolol tartrate, 50 mg, Oral, Q12H  montelukast, 10 mg, Oral, Nightly  nystatin, , Topical, TID  oxybutynin XL, 5 mg, Oral, Daily  pantoprazole, 40 mg, Oral, Nightly  ranolazine, 500 mg, Oral, Q12H  sennosides-docusate, 2 tablet, Oral, BID  sodium chloride, 10 mL, Intravenous, Q12H           Assessment/Plan   ASSESSMENT / PLAN      Chest pain    COPD (chronic obstructive pulmonary disease) (HCC)    CKD (chronic kidney disease), symptom management  only, stage 5 (HCC)    Acute on chronic congestive heart failure (HCC)    Anemia    Type 2 diabetes mellitus with hyperglycemia (HCC)    Pneumonitis    1.  ESRD on HD- Initiated HD on 10/21 due to hyperkalemia and fluid overload, now likely ESRD.  HD TTS now. hd tomorrow after heart cath     2.  Hyponatremia- Na <130. Modulate with HD     3.  Chest pain- ongoing ACS evaluation, stress test shows small-sized, mildly severe area of ischemia located in the basal inferior lateral wall. Plan for LHC tomorrow     4.  History of CAD     5.  History of COPD - On trilogy at night but left it at home     6.  Anemia / recent GI bleed / b12 deficiency-  s/p capsule endoscopy which showed few small non-bleeding intestinal AVMs and single small polyp. Keep b12. Keep folate. Check Fe.     7. 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 HTN- Continue home antihypertensives. Low dose lisinopril.                This document has been electronically signed by Ace Lauren MD on November 12, 2021 14:09 CST

## 2021-11-12 NOTE — THERAPY EVALUATION
Patient Name: Yamileth Slater  : 1960    MRN: [id number]                              Today's Date: 2021       Admit Date: 2021    Visit Dx:     ICD-10-CM ICD-9-CM   1. Chest pain, unspecified type  R07.9 786.50   2. Hyperglycemia  R73.9 790.29   3. Impaired mobility and ADLs  Z74.09 V49.89    Z78.9      Patient Active Problem List   Diagnosis   • Type 2 diabetes mellitus with diabetic polyneuropathy, with long-term current use of insulin ([facility])   • Chronic obstructive pulmonary disease ([facility])   • Chronic midline low back pain without sciatica   • Vitamin D deficiency   • Type 2 diabetes mellitus ([facility])   • Tobacco dependence syndrome   • Surgical follow-up care   • Shoulder joint pain   • Peripheral vascular disease ([facility])   • Pain   • Neurologic disorder associated with diabetes mellitus ([facility])   • Morbid obesity with BMI of 50.0-59.9, adult ([facility])   • Mixed hyperlipidemia   • Kidney stone   • History of colon polyps   • GERD (gastroesophageal reflux disease)   • Excoriated eczema   • Essential hypertension   • Encounter for medication refill   • Dyslipidemia   • Diabetes mellitus ([facility])   • Coronary artery disease   • COPD (chronic obstructive pulmonary disease) (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)   • Hypothyroidism   • Carotid artery disease ([facility])   • Current smoker   • DM type 2 with diabetic peripheral neuropathy ([facility])   • [place] abuse   • PAD (peripheral artery disease) ([facility])   • S/P CABG x 3   • Intercostal pain   • Venous insufficiency   • Chronic respiratory failure with hypoxia ([facility])   • LAFB (left anterior fascicular block)   • Pulmonary hypertension ([facility])   • Left hip pain   • Neck pain   • CKD (chronic kidney disease), symptom management only, stage 5 ([facility])   • MIKE and COPD overlap  syndrome ([facility])   • Pneumonia due to COVID-19 virus   • Morbid obesity ([facility])   • Type 2 diabetes mellitus with diabetic nephropathy, with long-term current use of insulin ([facility])   • Hyponatremia   • Hyperkalemia   • Anemia   • Cutaneous candidiasis   • Community acquired pneumonia of right middle lobe of lung   • MRSA infection   • Alkaline phosphatase elevation   • COVID-19 ruled out by laboratory testing   • Esophageal dysphagia   • Monilial esophagitis ([facility])   • NSTEMI, initial episode of care ([facility])   • CAD (coronary artery disease)   • Chronic respiratory failure with hypoxia ([facility])   • Pneumonia due to infectious organism   • Chronic hypercapnic respiratory failure ([facility])   • Cellulitis of left leg   • Acute on chronic congestive heart failure ([facility])   • Hyponatremia   • Urinary tract infection due to Proteus   • History of insertion of tunneled central venous catheter (CVC) with port   • Anemia due to chronic kidney disease, on chronic dialysis ([facility])   • Anemia   • Type 2 diabetes mellitus with hyperglycemia ([facility])   • Pneumonitis     Past Medical History:   Diagnosis Date   • Acute blood loss anemia 4/16/2017    Likely due to gastric oozing at this time. - Dr. Duarte (GI) was consulted and has now signed off, will follow up outpatient - pill colonoscopy showed AVMs - continue to monitor   • Anxiety    • Carotid artery stenosis    • Chronic obstructive lung disease ([facility])    • CKD (chronic kidney disease) stage 4, GFR 15-29 ml/min ([facility])    • Colonic polyp    • Coronary arteriosclerosis    • Diabetes mellitus ([facility])    • Diabetic neuropathy ([facility])    • Ear pain, right 10/18/2021    - canal trauma due to patient scratching and DMT2 - added cortisporin ear drops   • Elevated troponin 10/12/2021    -most likely from CKD -Trending down -Neg chest pain   • GERD (gastroesophageal reflux disease)    • GI bleed 5/13/2021    - GI will follow up outpatient - Protonix 40mg daily - Avoid medical DVT prophy and use mechanical at  this time instead. - Continue to monitor - pill colonoscopy results showed AVMs   • History of transfusion    • Hypercholesterolemia    • Hypertension    • Hypomagnesemia 6/27/2021    Monitor and replace   • Morbid obesity ([facility])    • Nephrolithiasis    • Peripheral vascular disease ([facility])    • Sleep apnea    • Substance abuse ([facility])    • Vitamin D deficiency      Past Surgical History:   Procedure Laterality Date   • CARDIAC CATHETERIZATION N/A 7/14/2020   • CARDIAC CATHETERIZATION N/A 4/23/2021    Procedure: Left Heart Cath;  Surgeon: Melba Romo MD;  Location: [facility] CATH INVASIVE LOCATION;  Service: Cardiology;  Laterality: N/A;   • CARDIAC CATHETERIZATION N/A 4/30/2021    Procedure: Percutaneous Coronary Intervention;  Surgeon: Russell Voss MD;  Location: [facility] CATH INVASIVE LOCATION;  Service: Cardiovascular;  Laterality: N/A;   • CARDIAC CATHETERIZATION N/A 4/30/2021    Procedure: Stent NIKKI coronary;  Surgeon: Russell Voss MD;  Location: [facility] CATH INVASIVE LOCATION;  Service: Cardiovascular;  Laterality: N/A;   • CAROTID STENT Left    • COLONOSCOPY     • COLONOSCOPY N/A 5/14/2021    Procedure: COLONOSCOPY;  Surgeon: Mingo Duarte MD;  Location: [facility] ENDOSCOPY;  Service: Gastroenterology;  Laterality: N/A;   • CORONARY ARTERY BYPASS GRAFT N/A 2013    CABG X 3   • CYSTOSCOPY BLADDER STONE LITHOTRIPSY Bilateral    • ENDOSCOPY N/A 4/12/2021    Procedure: ESOPHAGOGASTRODUODENOSCOPY;  Surgeon: Mingo Duarte MD;  Location: [facility] ENDOSCOPY;  Service: Gastroenterology;  Laterality: N/A;   • ENDOSCOPY N/A 5/14/2021    Procedure: ESOPHAGOGASTRODUODENOSCOPY;  Surgeon: Mingo Duarte MD;  Location: [facility] ENDOSCOPY;  Service: Gastroenterology;  Laterality: N/A;   • INTERVENTIONAL RADIOLOGY PROCEDURE N/A 10/21/2021    Procedure: tunneled central venous catheter placement;  Surgeon: Donnie Robles MD;  Location: [facility] ANGIO INVASIVE LOCATION;  Service: Interventional  Radiology;  Laterality: N/A;      General Information     Row Name 11/12/21 0825          OT Time and Intention    Document Type evaluation  -ME     Mode of Treatment co-treatment; occupational therapy; physical therapy  -ME     Row Name 11/12/21 0825          General Information    Patient Profile Reviewed yes  -ME     Prior Level of Function independent:; transfer; gait; min assist:; ADL's  -ME     Existing Precautions/Restrictions fall; oxygen therapy device and L/min  -ME     Row Name 11/12/21 0825          Living Environment    Lives With facility resident  -ME     Row Name 11/12/21 0825          Home Main Entrance    Number of Stairs, Main Entrance none  -ME     Row Name 11/12/21 0825          Stairs Within Home, Primary    Stairs, Within Home, Primary at [facility] for rehab; planning to return for further therapy; has been using a walker at rehab, but does not normally; requires some assistance with bathing  -ME     Number of Stairs, Within Home, Primary none  -ME     Row Name 11/12/21 0825          Cognition    Orientation Status (Cognition) oriented x 4  -ME     Row Name 11/12/21 0825          Safety Issues, Functional Mobility    Safety Issues Affecting Function (Mobility) safety precaution awareness; safety precautions follow-through/compliance  -ME     Impairments Affecting Function (Mobility) balance; endurance/activity tolerance; strength  -ME           User Key  (r) = Recorded By, (t) = Taken By, (c) = Cosigned By    Initials Name Provider Type    ME Nikole Espino OTR/L Occupational Therapist                 Mobility/ADL's     Row Name 11/12/21 0825          Bed Mobility    Bed Mobility supine-sit; sit-supine  -ME     Supine-Sit [place] (Bed Mobility) supervision  -ME     Sit-Supine [place] (Bed Mobility) supervision  -ME     Assistive Device (Bed Mobility) bed rails; head of bed elevated  -ME     Row Name 11/12/21 0825          Transfers    Transfers sit-stand transfer  -ME      Sit-Stand [place] (Transfers) contact guard  -[facility] Name 11/12/21 0825          Functional Mobility    Functional Mobility- Ind. Level contact guard assist  -ME     Row Name 11/12/21 0825          Activities of Daily Living    BADL Assessment/Intervention lower body dressing; grooming  -ME     Row Name 11/12/21 0825          Lower Body Dressing Assessment/Training    [place] Level (Lower Body Dressing) doff; don; shoes/slippers; independent  -ME     Position (Lower Body Dressing) edge of bed sitting  -ME     Row Name 11/12/21 0825          Grooming Assessment/Training    [place] Level (Grooming) wash face, hands; set up; independent  -ME     Position (Grooming) edge of bed sitting  -ME           User Key  (r) = Recorded By, (t) = Taken By, (c) = Cosigned By    Initials Name Provider Type    ME Nikole Espino OTR/L Occupational Therapist               Obj/Interventions     Row Name 11/12/21 0825          Sensory Assessment (Somatosensory)    Sensory Assessment (Somatosensory) other (see comments)  -ME     Row Name 11/12/21 0825          Sensory Interventions    Comment, Sensory Intervention states that she has numbness/tingling in the bilateral finger tips/fingers  -ME     Row Name 11/12/21 0825          Range of Motion Comprehensive    General Range of Motion no range of motion deficits identified; bilateral upper extremity ROM WFL  -ME     Row Name 11/12/21 0825          Strength Comprehensive (MMT)    General Manual Muscle Testing (MMT) Assessment other (see comments)  -ME     Comment, General Manual Muscle Testing (MMT) Assessment BUE strength and bilateral  strength are grossly 4+/5; pt is left handed  -ME           User Key  (r) = Recorded By, (t) = Taken By, (c) = Cosigned By    Initials Name Provider Type    Nikole Crawford OTR/L Occupational Therapist               Goals/Plan     Row Name 11/12/21 0825          Transfer Goal 1 (OT)    Activity/Assistive Device (Transfer Goal 1,  OT) toilet  -ME     [place] Level/Cues Needed (Transfer Goal 1, OT) modified independence  -ME     Time Frame (Transfer Goal 1, OT) long term goal (LTG); by discharge  -ME     Progress/Outcome (Transfer Goal 1, OT) goal not met  -ME     Row Name 11/12/21 0825          Bathing Goal 1 (OT)    Activity/Device (Bathing Goal 1, OT) lower body bathing  -ME     [place] Level/Cues Needed (Bathing Goal 1, OT) set-up required; contact guard assist  -ME     Time Frame (Bathing Goal 1, OT) long term goal (LTG); by discharge  -ME     Progress/Outcomes (Bathing Goal 1, OT) goal not met  -ME     Row Name 11/12/21 0825          Dressing Goal 1 (OT)    Activity/Device (Dressing Goal 1, OT) lower body dressing  -ME     [place]/Cues Needed (Dressing Goal 1, OT) set-up required; contact guard assist  -ME     Time Frame (Dressing Goal 1, OT) long term goal (LTG); by discharge  -ME     Progress/Outcome (Dressing Goal 1, OT) goal not met  -ME     Row Name 11/12/21 0825          Therapy Assessment/Plan (OT)    Planned Therapy Interventions (OT) adaptive equipment training; activity tolerance training; BADL retraining; functional balance retraining; IADL retraining; occupation/activity based interventions; patient/caregiver education/training; ROM/therapeutic exercise; strengthening exercise; transfer/mobility retraining  -ME           User Key  (r) = Recorded By, (t) = Taken By, (c) = Cosigned By    Initials Name Provider Type    ME Nikole Espino OTR/L Occupational Therapist               Clinical Impression     Row Name 11/12/21 0825          Pain Assessment    Additional Documentation Pain Scale: Numbers Pre/Post-Treatment (Group)  -ME     Row Name 11/12/21 0825          Pain Scale: Numbers Pre/Post-Treatment    Pretreatment Pain Rating 0/10 - no pain  -ME     Posttreatment Pain Rating 0/10 - no pain  -ME     Row Name 11/12/21 0825          Plan of Care Review    Plan of Care Reviewed With patient  -ME     Row Name  11/12/21 0825          Therapy Assessment/Plan (OT)    Patient/Family Therapy Goal Statement (OT) to return home  -ME     Rehab Potential (OT) good, to achieve stated therapy goals  -ME     Criteria for Skilled Therapeutic Interventions Met (OT) yes; meets criteria; skilled treatment is necessary  -ME     Therapy Frequency (OT) other (see comments)  5-7 days per week  -ME     Predicted Duration of Therapy Intervention (OT) until d/c or all goals met  -ME     Row Name 11/12/21 0825          Therapy Plan Review/Discharge Plan (OT)    Anticipated Discharge Disposition (OT) [facility] nursing facility  for further rehab  -ME     Row Name 11/12/21 0825          Vital Signs    Pre Systolic BP Rehab 126  -ME     Pre Treatment Diastolic BP 58  -ME     Pretreatment Heart Rate (beats/min) 71  -ME     Pre SpO2 (%) 97  -ME     O2 Delivery Pre Treatment nasal cannula  -ME     Pre Patient Position Supine  -ME     Row Name 11/12/21 0825          Positioning and Restraints    Pre-Treatment Position in bed  -ME     Post Treatment Position bed  -ME     In Bed notified nsg; fowlers; call light within reach; encouraged to call for assist  -ME           User Key  (r) = Recorded By, (t) = Taken By, (c) = Cosigned By    Initials Name Provider Type    ME Nikole Espino, OTR/L Occupational Therapist               Outcome Measures     Row Name 11/12/21 0825          How much help from another is currently needed...    Putting on and taking off regular lower body clothing? 3  -ME     Bathing (including washing, rinsing, and drying) 3  -ME     Toileting (which includes using toilet bed pan or urinal) 3  -ME     Putting on and taking off regular upper body clothing 3  -ME     Taking care of personal grooming (such as brushing teeth) 4  -ME     Eating meals 4  -ME     AM-PAC 6 Clicks Score (OT) 20  -ME     Row Name 11/12/21 0903          How much help from another person do you currently need...    Turning from your back to your side while in flat  bed without using bedrails? 4  -KW     Moving from lying on back to sitting on the side of a flat bed without bedrails? 4  -KW     Moving to and from a bed to a chair (including a wheelchair)? 3  -KW     Standing up from a chair using your arms (e.g., wheelchair, bedside chair)? 3  -KW     Climbing 3-5 steps with a railing? 3  -KW     To walk in hospital room? 3  -KW     AM-PAC 6 Clicks Score (PT) 20  -KW     Row Name 11/12/21 0903 11/12/21 0825       Functional Assessment    Outcome Measure Options AM-PAC 6 Clicks Basic Mobility (PT)  -KW AM-PAC 6 Clicks Daily Activity (OT)  -ME          User Key  (r) = Recorded By, (t) = Taken By, (c) = Cosigned By    Initials Name Provider Type    Hilaria Roldan, PT Physical Therapist    Nikole Crawford, OTR/L Occupational Therapist                Occupational Therapy Education                 Title: PT OT SLP Therapies (In Progress)     Topic: Occupational Therapy (In Progress)     Point: ADL training (Not Started)     Description:   Instruct learner(s) on proper safety adaptation and remediation techniques during self care or transfers.   Instruct in proper use of assistive devices.              Learner Progress:  Not documented in this visit.          Point: Home exercise program (Not Started)     Description:   Instruct learner(s) on appropriate technique for monitoring, assisting and/or progressing therapeutic exercises/activities.              Learner Progress:  Not documented in this visit.          Point: Precautions (Done)     Description:   Instruct learner(s) on prescribed precautions during self-care and functional transfers.              Learning Progress Summary           Patient Acceptance, E, VU by ME at 11/12/2021 0855    Comment: Educated on OT and POC. Educated to call for assistance. Educated on safety precautions.                   Point: Body mechanics (Done)     Description:   Instruct learner(s) on proper positioning and spine alignment during  self-care, functional mobility activities and/or exercises.              Learning Progress Summary           Patient Acceptance, E, VU by ME at 11/12/2021 0855    Comment: Educated on OT and POC. Educated to call for assistance. Educated on safety precautions.                               User Key     Initials Effective Dates Name Provider Type Discipline    ME 06/16/21 -  SrinivasChaunceyAgustin, OTR/L Occupational Therapist OT              OT Recommendation and Plan  Planned Therapy Interventions (OT): adaptive equipment training, activity tolerance training, BADL retraining, functional balance retraining, IADL retraining, occupation/activity based interventions, patient/caregiver education/training, ROM/therapeutic exercise, strengthening exercise, transfer/mobility retraining  Therapy Frequency (OT): other (see comments) (5-7 days per week)  Plan of Care Review  Plan of Care Reviewed With: patient  Outcome Summary: initial OT evaluation complete. co-eval with PT. pt was pleasant and cooperative throughout. has been at [facility] for therapy since recent discharge. plans to return there at discharge. BUE ROM WFL grossly. BUE strength and bilateral  strength are grossly 4+/5. pt was supervision for bed mobility. was able to slip slippers on and off independently sitting at EOB. CGA for transfers and functional mobility with no AD. returned to bed at end of session with all needs in reach. recommend further skilled OT services to address functional mobility and ADL deficits, as well as balance, strength, and endurance. goals established.     Time Calculation:    Time Calculation- OT     Row Name 11/12/21 0904             Time Calculation- OT    OT Start Time 0825  -ME      OT Stop Time 0903  -ME      OT Time Calculation (min) 38 min  -ME      OT Received On 11/12/21  -ME      OT Goal Re-Cert Due Date 11/25/21  -ME              Untimed Charges    OT Eval/Re-eval Minutes 38  -ME              Total  Minutes    Untimed Charges Total Minutes 38  -ME       Total Minutes 38  -ME            User Key  (r) = Recorded By, (t) = Taken By, (c) = Cosigned By    Initials Name Provider Type    Nikole Crawford OTR/L Occupational Therapist              Therapy Charges for Today     Code Description Service Date Service Provider Modifiers Qty    [id number] HC OT EVAL MOD COMPLEXITY 3 11/12/2021 Nikole Espino OTR/L GO 1               Nikole Espino OTR/AISHWARYA  11/12/2021

## 2021-11-12 NOTE — PLAN OF CARE
Goal Outcome Evaluation:           Progress: no change  Outcome Summary: Initial visit.  Mild Sodium Restricted diet info used to provide Low Sodium Diet ed and diet copy placed in packet to be sent with pt on dischargel

## 2021-11-12 NOTE — PLAN OF CARE
Goal Outcome Evaluation:  Plan of Care Reviewed With: patient           Outcome Summary: PT evaluation completed this date as co-eval with OT. Pt pleasant and agreeable to eval. Pt performing sit<>supine with supervision and sitting EOB WFL. Pt performing sit<>stand with CGA and ambulating 80ft with CGA and no AD. Pt with 1 LOB requiring Carl to correct. Pt would benefit from further skilled PT to increase functional mobility, endurance, strength, safety, balance, and to progress towards PLOF. Upon d/c from acute care, recommend return to SNF for further therapy prior to home.

## 2021-11-12 NOTE — PROGRESS NOTES
FAMILY MEDICINE RESIDENCY SERVICE  DAILY PROGRESS NOTE    NAME: Yamileth Slater  : 1960  MRN: [id number]      LOS: 0 days     PROVIDER OF SERVICE: Ashley Coker MD    Chief Complaint: Chest pain    Subjective:     Interval History:  History taken from: patient  The patient states that she is doing well today.  She states that she is she does not have a chest pain today.  She states that she is aware that she is going to the Cath Lab tomorrow before she goes to dialysis.  She denies abdominal pain, nausea vomiting, problems with her bowel movements.     Review of Systems:   Review of Systems   Constitutional: Negative for fatigue.   Respiratory: Negative for shortness of breath.    Cardiovascular: Negative for chest pain and leg swelling.   Gastrointestinal: Negative for abdominal pain, constipation, diarrhea and nausea.   Endocrine: Negative for cold intolerance.   Skin: Negative for rash.   Neurological: Negative for weakness, light-headedness and numbness.       Objective:     Vital Signs  Temp:  [96.8 °F (36 °C)-97.4 °F (36.3 °C)] 97 °F (36.1 °C)  Heart Rate:  [] 77  Resp:  [16-18] 18  BP: ()/(43-67) 133/60  Flow (L/min):  [3] 3   Body mass index is 52.02 kg/m².    Physical Exam  Physical Exam  Vitals reviewed.   Constitutional:       Appearance: Normal appearance. She is obese.   HENT:      Head: Normocephalic and atraumatic.      Right Ear: Tympanic membrane normal.      Left Ear: Tympanic membrane normal.      Nose: Nose normal.      Mouth/Throat:      Mouth: Mucous membranes are moist.   Eyes:      Pupils: Pupils are equal, round, and reactive to light.   Cardiovascular:      Rate and Rhythm: Normal rate.      Pulses: Normal pulses.      Heart sounds: Normal heart sounds.   Pulmonary:      Effort: Pulmonary effort is normal.      Breath sounds: Normal breath sounds.      Comments: On nasal canula   Abdominal:      General: Abdomen is flat. Bowel sounds are normal.      Palpations:  Abdomen is soft.   Musculoskeletal:         General: Normal range of motion.      Cervical back: Normal range of motion and neck supple.   Skin:     General: Skin is warm and dry.      Capillary Refill: Capillary refill takes less than 2 seconds.   Neurological:      General: No focal deficit present.      Mental Status: She is alert and oriented to person, place, and time. Mental status is at baseline.   Psychiatric:         Mood and Affect: Mood normal.         Scheduled Meds   aspirin, 81 mg, Oral, Daily  atorvastatin, 20 mg, Oral, Daily  bumetanide, 2 mg, Oral, Once per day on Sun Mon Wed Fri  cetirizine, 10 mg, Oral, Daily  clopidogrel, 75 mg, Oral, Daily  DULoxetine, 20 mg, Oral, Daily  ferrous sulfate, 324 mg, Oral, Daily With Breakfast  fluticasone, 2 spray, Each Nare, Daily  gabapentin, 400 mg, Oral, TID  heparin (porcine), 5,000 Units, Subcutaneous, Q12H  insulin aspart, 0-7 Units, Subcutaneous, TID AC  insulin aspart, 30 Units, Subcutaneous, TID With Meals  insulin detemir, 50 Units, Subcutaneous, Nightly  ipratropium-albuterol, 3 mL, Nebulization, 4x Daily - RT  isosorbide mononitrate, 30 mg, Oral, QAM  levothyroxine, 25 mcg, Oral, Daily  lidocaine, 1 patch, Transdermal, Q24H  lisinopril, 10 mg, Oral, Daily  metoprolol tartrate, 50 mg, Oral, Q12H  montelukast, 10 mg, Oral, Nightly  nystatin, , Topical, TID  oxybutynin XL, 5 mg, Oral, Daily  pantoprazole, 40 mg, Oral, Nightly  ranolazine, 500 mg, Oral, Q12H  sennosides-docusate, 2 tablet, Oral, BID  sodium chloride, 10 mL, Intravenous, Q12H       PRN Meds   •  acetaminophen  •  albuterol  •  cyclobenzaprine  •  dextrose  •  dextrose  •  glucagon (human recombinant)  •  heparin (porcine)  •  heparin (porcine)  •  influenza vaccine  •  melatonin  •  Morphine **AND** naloxone  •  nitroglycerin  •  ondansetron  •  ondansetron ODT  •  promethazine  •  sodium chloride  •  sodium chloride      Diagnostic Data    Results from last 7 days   Lab Units  11/12/21  0531   WBC 10*3/mm3 7.06   HEMOGLOBIN g/dL 8.4*   HEMATOCRIT % 25.0*   PLATELETS 10*3/mm3 265   GLUCOSE mg/dL 391*   CREATININE mg/dL 3.49*   BUN mg/dL 44*   SODIUM mmol/L 127*   POTASSIUM mmol/L 4.9   AST (SGOT) U/L 11   ALT (SGPT) U/L 8   ALK PHOS U/L 145*   BILIRUBIN mg/dL <0.2   ANION GAP mmol/L 15.0       No radiology results for the last day      I reviewed the patient's new clinical results.    Assessment/Plan:     Active Hospital Problems    Diagnosis  POA   • **Chest pain [R07.9]  Yes     -S/P CABG x 3 (Primary), Bilateral carotid artery stenosis w/ 2 stents on left side,  PAD (peripheral artery disease) with stent in right upper leg, Morbid obesity   -Continue aspirin, Plavix, atorvastatin, lisinopril,  isosorbide mononitrate 60 mg daily     -EKG: No ST segment changes.   -Troponin negative  -NPO after midnight  -Cardio consult states that they would like to take the patient to Cath lab tomorrow before the patient goes to dialysis.     • Anemia [D64.9]  Unknown     -Anemia of chronic disease vs GI bleed  -No active bleeding on recent EGD/Colonoscopy  -Recent capsule endoscopy which showed few small nonbleeding intestinal AVMs and single small intestinal polyp which GI planned to follow outpatient  -Continue to monitor H/H  -Will transfuse if Hgb <8  -Continue home ferrous sulfate. May be a candidate for epogen and IV iron  -Follow stool occult test     • Type 2 diabetes mellitus with hyperglycemia (HCC) [E11.65]  Unknown     -Continue 20 units of levemir in the morning , 30 units of novolog with meals, and 50 units of levemir at night  -SSI for additional coverage     • Pneumonitis [J18.9]  Unknown   • Acute on chronic congestive heart failure (HCC) [I50.9]  Yes     - Continue Imdur, metoprolol, lisinopril      • CKD (chronic kidney disease), symptom management only, stage 5 (HCC) [N18.5]  Yes     Results from last 7 days   Lab Units 11/10/21  0538 11/09/21  0608 11/08/21  1450   CREATININE mg/dL  3.49* 3.16* 3.27*     -Completes outpatient HD on TTS  -Nephrology on board. Will require HD in house     • COPD (chronic obstructive pulmonary disease) ([facility]) [J44.9]  Yes     -O2 supplementation  -Home inhalers as needed  -DuoNebs  -CPAP at night          DVT prophylaxis: Heparin  Code status is   Code Status and Medical Interventions:   Ordered at: 11/08/21 1734     Code Status (Patient has no pulse and is not breathing):    CPR (Attempt to Resuscitate)     Medical Interventions (Patient has pulse or is breathing):    Full Support       Plan for disposition:Where: rehab      Time: 15 min          This document has been electronically signed by Ashley Coker MD on November 12, 2021 08:23 CST

## 2021-11-12 NOTE — NURSING NOTE
"Pt is requesting an additional supper tray. Pt states \"I will eat my fish but I won't eat the sauteed vegetables or the rice.\" Educated pt that Dr. Stephenson has ordered an ADA diet with carb counts. Pt requested a cheese burger and salad.  "

## 2021-11-13 LAB
ABO GROUP BLD: NORMAL
ALBUMIN SERPL-MCNC: 3.1 G/DL (ref 3.5–5.2)
ALBUMIN/GLOB SERPL: 0.8 G/DL
ALP SERPL-CCNC: 160 U/L (ref 39–117)
ALT SERPL W P-5'-P-CCNC: 9 U/L (ref 1–33)
ANION GAP SERPL CALCULATED.3IONS-SCNC: 12 MMOL/L (ref 5–15)
ANION GAP SERPL CALCULATED.3IONS-SCNC: 13 MMOL/L (ref 5–15)
AST SERPL-CCNC: 10 U/L (ref 1–32)
BASOPHILS # BLD AUTO: 0.08 10*3/MM3 (ref 0–0.2)
BASOPHILS NFR BLD AUTO: 1 % (ref 0–1.5)
BILIRUB SERPL-MCNC: 0.2 MG/DL (ref 0–1.2)
BLD GP AB SCN SERPL QL: NEGATIVE
BUN SERPL-MCNC: 21 MG/DL (ref 8–23)
BUN SERPL-MCNC: 52 MG/DL (ref 8–23)
BUN/CREAT SERPL: 10.8 (ref 7–25)
BUN/CREAT SERPL: 12.8 (ref 7–25)
CALCIUM SPEC-SCNC: 9 MG/DL (ref 8.6–10.5)
CALCIUM SPEC-SCNC: 9.1 MG/DL (ref 8.6–10.5)
CHLORIDE SERPL-SCNC: 92 MMOL/L (ref 98–107)
CHLORIDE SERPL-SCNC: 95 MMOL/L (ref 98–107)
CO2 SERPL-SCNC: 22 MMOL/L (ref 22–29)
CO2 SERPL-SCNC: 26 MMOL/L (ref 22–29)
CREAT SERPL-MCNC: 1.95 MG/DL (ref 0.57–1)
CREAT SERPL-MCNC: 4.06 MG/DL (ref 0.57–1)
DEPRECATED RDW RBC AUTO: 43.9 FL (ref 37–54)
EOSINOPHIL # BLD AUTO: 0.29 10*3/MM3 (ref 0–0.4)
EOSINOPHIL NFR BLD AUTO: 3.8 % (ref 0.3–6.2)
ERYTHROCYTE [DISTWIDTH] IN BLOOD BY AUTOMATED COUNT: 14.9 % (ref 12.3–15.4)
GFR SERPL CREATININE-BSD FRML MDRD: 11 ML/MIN/1.73
GFR SERPL CREATININE-BSD FRML MDRD: 26 ML/MIN/1.73
GFR SERPL CREATININE-BSD FRML MDRD: ABNORMAL ML/MIN/{1.73_M2}
GLOBULIN UR ELPH-MCNC: 4.1 GM/DL
GLUCOSE BLDC GLUCOMTR-MCNC: 134 MG/DL (ref 70–130)
GLUCOSE BLDC GLUCOMTR-MCNC: 164 MG/DL (ref 70–130)
GLUCOSE BLDC GLUCOMTR-MCNC: 187 MG/DL (ref 70–130)
GLUCOSE BLDC GLUCOMTR-MCNC: 192 MG/DL (ref 70–130)
GLUCOSE BLDC GLUCOMTR-MCNC: 197 MG/DL (ref 70–130)
GLUCOSE BLDC GLUCOMTR-MCNC: 201 MG/DL (ref 70–130)
GLUCOSE BLDC GLUCOMTR-MCNC: 213 MG/DL (ref 70–130)
GLUCOSE BLDC GLUCOMTR-MCNC: 231 MG/DL (ref 70–130)
GLUCOSE BLDC GLUCOMTR-MCNC: 319 MG/DL (ref 70–130)
GLUCOSE BLDC GLUCOMTR-MCNC: 402 MG/DL (ref 70–130)
GLUCOSE BLDC GLUCOMTR-MCNC: 413 MG/DL (ref 70–130)
GLUCOSE BLDC GLUCOMTR-MCNC: 431 MG/DL (ref 70–130)
GLUCOSE SERPL-MCNC: 214 MG/DL (ref 65–99)
GLUCOSE SERPL-MCNC: 363 MG/DL (ref 65–99)
HCT VFR BLD AUTO: 23.1 % (ref 34–46.6)
HCT VFR BLD AUTO: 24.3 % (ref 34–46.6)
HGB BLD-MCNC: 7.7 G/DL (ref 12–15.9)
HGB BLD-MCNC: 8 G/DL (ref 12–15.9)
IMM GRANULOCYTES # BLD AUTO: 0.14 10*3/MM3 (ref 0–0.05)
IMM GRANULOCYTES NFR BLD AUTO: 1.8 % (ref 0–0.5)
INR PPP: 0.99 (ref 0.8–1.2)
LYMPHOCYTES # BLD AUTO: 1.83 10*3/MM3 (ref 0.7–3.1)
LYMPHOCYTES NFR BLD AUTO: 23.9 % (ref 19.6–45.3)
Lab: NORMAL
MCH RBC QN AUTO: 27.3 PG (ref 26.6–33)
MCHC RBC AUTO-ENTMCNC: 33.3 G/DL (ref 31.5–35.7)
MCV RBC AUTO: 81.9 FL (ref 79–97)
MONOCYTES # BLD AUTO: 0.52 10*3/MM3 (ref 0.1–0.9)
MONOCYTES NFR BLD AUTO: 6.8 % (ref 5–12)
NEUTROPHILS NFR BLD AUTO: 4.79 10*3/MM3 (ref 1.7–7)
NEUTROPHILS NFR BLD AUTO: 62.7 % (ref 42.7–76)
NRBC BLD AUTO-RTO: 0 /100 WBC (ref 0–0.2)
PLATELET # BLD AUTO: 273 10*3/MM3 (ref 140–450)
PMV BLD AUTO: 8.9 FL (ref 6–12)
POTASSIUM SERPL-SCNC: 4 MMOL/L (ref 3.5–5.2)
POTASSIUM SERPL-SCNC: 4.5 MMOL/L (ref 3.5–5.2)
PROT SERPL-MCNC: 7.2 G/DL (ref 6–8.5)
PROTHROMBIN TIME: 13 SECONDS (ref 11.1–15.3)
RBC # BLD AUTO: 2.82 10*6/MM3 (ref 3.77–5.28)
RH BLD: POSITIVE
SODIUM SERPL-SCNC: 127 MMOL/L (ref 136–145)
SODIUM SERPL-SCNC: 133 MMOL/L (ref 136–145)
T&S EXPIRATION DATE: NORMAL
WBC # BLD AUTO: 7.65 10*3/MM3 (ref 3.4–10.8)

## 2021-11-13 PROCEDURE — 94760 N-INVAS EAR/PLS OXIMETRY 1: CPT

## 2021-11-13 PROCEDURE — 94799 UNLISTED PULMONARY SVC/PX: CPT

## 2021-11-13 PROCEDURE — 82962 GLUCOSE BLOOD TEST: CPT

## 2021-11-13 PROCEDURE — C1725 CATH, TRANSLUMIN NON-LASER: HCPCS | Performed by: INTERNAL MEDICINE

## 2021-11-13 PROCEDURE — C1769 GUIDE WIRE: HCPCS | Performed by: INTERNAL MEDICINE

## 2021-11-13 PROCEDURE — 99153 MOD SED SAME PHYS/QHP EA: CPT | Performed by: INTERNAL MEDICINE

## 2021-11-13 PROCEDURE — 85014 HEMATOCRIT: CPT | Performed by: STUDENT IN AN ORGANIZED HEALTH CARE EDUCATION/TRAINING PROGRAM

## 2021-11-13 PROCEDURE — 99222 1ST HOSP IP/OBS MODERATE 55: CPT | Performed by: INTERNAL MEDICINE

## 2021-11-13 PROCEDURE — 0 INSULIN REGULAR HUMAN PER 5 UNITS: Performed by: INTERNAL MEDICINE

## 2021-11-13 PROCEDURE — 25010000002 FENTANYL CITRATE (PF) 50 MCG/ML SOLUTION: Performed by: INTERNAL MEDICINE

## 2021-11-13 PROCEDURE — C1894 INTRO/SHEATH, NON-LASER: HCPCS | Performed by: INTERNAL MEDICINE

## 2021-11-13 PROCEDURE — 86850 RBC ANTIBODY SCREEN: CPT | Performed by: INTERNAL MEDICINE

## 2021-11-13 PROCEDURE — 25010000002 BIVALIRUDIN TRIFLUOROACETATE 250 MG RECONSTITUTED SOLUTION 1 EACH VIAL: Performed by: INTERNAL MEDICINE

## 2021-11-13 PROCEDURE — 25010000002 HEPARIN (PORCINE) PER 1000 UNITS: Performed by: INTERNAL MEDICINE

## 2021-11-13 PROCEDURE — 86900 BLOOD TYPING SEROLOGIC ABO: CPT | Performed by: INTERNAL MEDICINE

## 2021-11-13 PROCEDURE — 25010000002 MIDAZOLAM PER 1 MG: Performed by: INTERNAL MEDICINE

## 2021-11-13 PROCEDURE — 25010000002 HEPARIN (PORCINE) PER 1000 UNITS: Performed by: STUDENT IN AN ORGANIZED HEALTH CARE EDUCATION/TRAINING PROGRAM

## 2021-11-13 PROCEDURE — 85018 HEMOGLOBIN: CPT | Performed by: STUDENT IN AN ORGANIZED HEALTH CARE EDUCATION/TRAINING PROGRAM

## 2021-11-13 PROCEDURE — C1887 CATHETER, GUIDING: HCPCS | Performed by: INTERNAL MEDICINE

## 2021-11-13 PROCEDURE — 85610 PROTHROMBIN TIME: CPT | Performed by: INTERNAL MEDICINE

## 2021-11-13 PROCEDURE — B2131ZZ FLUOROSCOPY OF MULTIPLE CORONARY ARTERY BYPASS GRAFTS USING LOW OSMOLAR CONTRAST: ICD-10-PCS | Performed by: INTERNAL MEDICINE

## 2021-11-13 PROCEDURE — C1874 STENT, COATED/COV W/DEL SYS: HCPCS | Performed by: INTERNAL MEDICINE

## 2021-11-13 PROCEDURE — 0 IOPAMIDOL PER 1 ML: Performed by: INTERNAL MEDICINE

## 2021-11-13 PROCEDURE — 027034Z DILATION OF CORONARY ARTERY, ONE ARTERY WITH DRUG-ELUTING INTRALUMINAL DEVICE, PERCUTANEOUS APPROACH: ICD-10-PCS | Performed by: INTERNAL MEDICINE

## 2021-11-13 PROCEDURE — 93455 CORONARY ART/GRFT ANGIO S&I: CPT | Performed by: INTERNAL MEDICINE

## 2021-11-13 PROCEDURE — 86923 COMPATIBILITY TEST ELECTRIC: CPT

## 2021-11-13 PROCEDURE — 25010000002 HYDRALAZINE PER 20 MG: Performed by: INTERNAL MEDICINE

## 2021-11-13 PROCEDURE — 99152 MOD SED SAME PHYS/QHP 5/>YRS: CPT | Performed by: INTERNAL MEDICINE

## 2021-11-13 PROCEDURE — 4A023N7 MEASUREMENT OF CARDIAC SAMPLING AND PRESSURE, LEFT HEART, PERCUTANEOUS APPROACH: ICD-10-PCS | Performed by: INTERNAL MEDICINE

## 2021-11-13 PROCEDURE — 02C03ZZ EXTIRPATION OF MATTER FROM CORONARY ARTERY, ONE ARTERY, PERCUTANEOUS APPROACH: ICD-10-PCS | Performed by: INTERNAL MEDICINE

## 2021-11-13 PROCEDURE — 25010000002 ONDANSETRON PER 1 MG: Performed by: INTERNAL MEDICINE

## 2021-11-13 PROCEDURE — C9600 PERC DRUG-EL COR STENT SING: HCPCS | Performed by: INTERNAL MEDICINE

## 2021-11-13 PROCEDURE — 85025 COMPLETE CBC W/AUTO DIFF WBC: CPT | Performed by: INTERNAL MEDICINE

## 2021-11-13 PROCEDURE — B2111ZZ FLUOROSCOPY OF MULTIPLE CORONARY ARTERIES USING LOW OSMOLAR CONTRAST: ICD-10-PCS | Performed by: INTERNAL MEDICINE

## 2021-11-13 PROCEDURE — 86901 BLOOD TYPING SEROLOGIC RH(D): CPT | Performed by: INTERNAL MEDICINE

## 2021-11-13 PROCEDURE — C1757 CATH, THROMBECTOMY/EMBOLECT: HCPCS | Performed by: INTERNAL MEDICINE

## 2021-11-13 PROCEDURE — 80053 COMPREHEN METABOLIC PANEL: CPT | Performed by: STUDENT IN AN ORGANIZED HEALTH CARE EDUCATION/TRAINING PROGRAM

## 2021-11-13 DEVICE — XIENCE SKYPOINT™ EVEROLIMUS ELUTING CORONARY STENT SYSTEM 3.25 MM X 15 MM / RAPID-EXCHANGE
Type: IMPLANTABLE DEVICE | Status: FUNCTIONAL
Brand: XIENCE SKYPOINT™

## 2021-11-13 RX ORDER — LIDOCAINE HYDROCHLORIDE 20 MG/ML
INJECTION, SOLUTION INFILTRATION; PERINEURAL AS NEEDED
Status: DISCONTINUED | OUTPATIENT
Start: 2021-11-13 | End: 2021-11-13 | Stop reason: HOSPADM

## 2021-11-13 RX ORDER — SODIUM CHLORIDE 0.9 % (FLUSH) 0.9 %
3 SYRINGE (ML) INJECTION EVERY 12 HOURS SCHEDULED
Status: DISCONTINUED | OUTPATIENT
Start: 2021-11-13 | End: 2021-11-13 | Stop reason: HOSPADM

## 2021-11-13 RX ORDER — HYDRALAZINE HYDROCHLORIDE 20 MG/ML
INJECTION INTRAMUSCULAR; INTRAVENOUS AS NEEDED
Status: DISCONTINUED | OUTPATIENT
Start: 2021-11-13 | End: 2021-11-13 | Stop reason: HOSPADM

## 2021-11-13 RX ORDER — DEXTROSE MONOHYDRATE 25 G/50ML
25-50 INJECTION, SOLUTION INTRAVENOUS
Status: DISCONTINUED | OUTPATIENT
Start: 2021-11-13 | End: 2021-11-14

## 2021-11-13 RX ORDER — ONDANSETRON 2 MG/ML
INJECTION INTRAMUSCULAR; INTRAVENOUS AS NEEDED
Status: DISCONTINUED | OUTPATIENT
Start: 2021-11-13 | End: 2021-11-13 | Stop reason: HOSPADM

## 2021-11-13 RX ORDER — MIDAZOLAM HYDROCHLORIDE 1 MG/ML
INJECTION INTRAMUSCULAR; INTRAVENOUS AS NEEDED
Status: DISCONTINUED | OUTPATIENT
Start: 2021-11-13 | End: 2021-11-13 | Stop reason: HOSPADM

## 2021-11-13 RX ORDER — SODIUM CHLORIDE 0.9 % (FLUSH) 0.9 %
30 SYRINGE (ML) INJECTION ONCE AS NEEDED
Status: DISCONTINUED | OUTPATIENT
Start: 2021-11-13 | End: 2021-11-14

## 2021-11-13 RX ORDER — SODIUM CHLORIDE 0.9 % (FLUSH) 0.9 %
10 SYRINGE (ML) INJECTION AS NEEDED
Status: DISCONTINUED | OUTPATIENT
Start: 2021-11-13 | End: 2021-11-13 | Stop reason: HOSPADM

## 2021-11-13 RX ORDER — SODIUM CHLORIDE 9 MG/ML
75 INJECTION, SOLUTION INTRAVENOUS CONTINUOUS
Status: DISCONTINUED | OUTPATIENT
Start: 2021-11-13 | End: 2021-11-15

## 2021-11-13 RX ORDER — HEPARIN SODIUM 1000 [USP'U]/ML
2000 INJECTION, SOLUTION INTRAVENOUS; SUBCUTANEOUS AS NEEDED
Status: DISCONTINUED | OUTPATIENT
Start: 2021-11-13 | End: 2021-11-13

## 2021-11-13 RX ORDER — SODIUM CHLORIDE 9 MG/ML
30 INJECTION, SOLUTION INTRAVENOUS CONTINUOUS PRN
Status: DISCONTINUED | OUTPATIENT
Start: 2021-11-13 | End: 2021-11-14

## 2021-11-13 RX ORDER — ALBUMIN (HUMAN) 12.5 G/50ML
12.5 SOLUTION INTRAVENOUS AS NEEDED
Status: DISPENSED | OUTPATIENT
Start: 2021-11-13 | End: 2021-11-13

## 2021-11-13 RX ORDER — FENTANYL CITRATE 50 UG/ML
INJECTION, SOLUTION INTRAMUSCULAR; INTRAVENOUS AS NEEDED
Status: DISCONTINUED | OUTPATIENT
Start: 2021-11-13 | End: 2021-11-13 | Stop reason: HOSPADM

## 2021-11-13 RX ADMIN — IPRATROPIUM BROMIDE AND ALBUTEROL SULFATE 3 ML: 2.5; .5 SOLUTION RESPIRATORY (INHALATION) at 19:27

## 2021-11-13 RX ADMIN — SODIUM CHLORIDE 2.8 UNITS/HR: 9 INJECTION, SOLUTION INTRAVENOUS at 18:09

## 2021-11-13 RX ADMIN — HEPARIN SODIUM 5000 UNITS: 5000 INJECTION INTRAVENOUS; SUBCUTANEOUS at 20:47

## 2021-11-13 RX ADMIN — HEPARIN SODIUM 2000 UNITS: 1000 INJECTION INTRAVENOUS; SUBCUTANEOUS at 17:55

## 2021-11-13 RX ADMIN — SODIUM CHLORIDE 30 ML/HR: 9 INJECTION, SOLUTION INTRAVENOUS at 18:07

## 2021-11-13 RX ADMIN — PANTOPRAZOLE SODIUM 40 MG: 40 TABLET, DELAYED RELEASE ORAL at 20:47

## 2021-11-13 RX ADMIN — DOCUSATE SODIUM 50 MG AND SENNOSIDES 8.6 MG 2 TABLET: 8.6; 5 TABLET, FILM COATED ORAL at 20:47

## 2021-11-13 RX ADMIN — SODIUM CHLORIDE 75 ML/HR: 9 INJECTION, SOLUTION INTRAVENOUS at 06:25

## 2021-11-13 RX ADMIN — HEPARIN SODIUM 2000 UNITS: 1000 INJECTION INTRAVENOUS; SUBCUTANEOUS at 17:57

## 2021-11-13 RX ADMIN — IPRATROPIUM BROMIDE AND ALBUTEROL SULFATE 3 ML: 2.5; .5 SOLUTION RESPIRATORY (INHALATION) at 06:39

## 2021-11-13 RX ADMIN — NYSTATIN: 100000 POWDER TOPICAL at 23:41

## 2021-11-13 RX ADMIN — METOPROLOL TARTRATE 50 MG: 50 TABLET, FILM COATED ORAL at 20:47

## 2021-11-13 RX ADMIN — MONTELUKAST 10 MG: 10 TABLET, FILM COATED ORAL at 20:47

## 2021-11-13 RX ADMIN — GABAPENTIN 400 MG: 400 CAPSULE ORAL at 20:48

## 2021-11-13 RX ADMIN — RANOLAZINE 500 MG: 500 TABLET, FILM COATED, EXTENDED RELEASE ORAL at 20:47

## 2021-11-13 NOTE — PLAN OF CARE
Goal Outcome Evaluation:  Plan of Care Reviewed With: patient           Outcome Summary: Pt resting well at this time; to have L side heart cath and dialysis this AM; vital signs stable

## 2021-11-13 NOTE — PROGRESS NOTES
FAMILY MEDICINE DAILY PROGRESS NOTE    NAME: Yamileth Slater  : 1960  MRN: [id number]      LOS: 1 day     PROVIDER OF SERVICE: Alvarado Mauricio MD    Chief Complaint: Chest pain    Subjective:     Interval History:  History taken from: patient chart       Afebrile. VS stable.     Patient was taken back for heart cath this morning, so went to cath lab to lay eyes on her. Review of systems and physical limited to cath lab status. Will return and round again when she is released to the floor.    Critical lab value of hemoglobin 7.7. Previous documentation suggests to transfuse for hemoglobin under 8, but patient currently receiving heart cath. Will discuss with team.    Review of Systems:   Review of Systems   Unable to perform ROS: Other   Patient receiving heart cath.     Objective:     Vital Signs  Temp:  [96.9 °F (36.1 °C)-97.5 °F (36.4 °C)] 96.9 °F (36.1 °C)  Heart Rate:  [66-81] 68  Resp:  [16-18] 18  BP: (132-146)/(60-65) 144/62  Body mass index is 52.75 kg/m².    Physical Exam  Physical Exam  Constitutional:       Comments: Patient on cath lab table undergoing heart cath          Medication Review    Current Facility-Administered Medications:   •  [MAR Hold] acetaminophen (TYLENOL) tablet 650 mg, 650 mg, Oral, Q8H PRN, Carline Coffey MD, 650 mg at 11/10/21 2214  •  [MAR Hold] albuterol (PROVENTIL) nebulizer solution 0.083% 2.5 mg/3mL, 2.5 mg, Nebulization, Q4H PRN, Carline Coffey MD  •  [MAR Hold] aspirin EC tablet 81 mg, 81 mg, Oral, Daily, Carline Coffey MD, 81 mg at 21 0830  •  [MAR Hold] atorvastatin (LIPITOR) tablet 20 mg, 20 mg, Oral, Daily, Carline Coffey MD, 20 mg at 21 0831  •  bivalirudin (ANGIOMAX) bolus from bag, , , PRN, Niall Rios MD, 98.25 mg at 21 0942  •  [MAR Hold] bumetanide (BUMEX) tablet 2 mg, 2 mg, Oral, Once per day on Sun , Ace Lauren MD, 2 mg at 21 0830  •  [MAR Hold] cetirizine (zyrTEC) tablet 10 mg, 10 mg, Oral,  Daily, Carline Coffey MD, 10 mg at 11/12/21 0830  •  [MAR Hold] clopidogrel (PLAVIX) tablet 75 mg, 75 mg, Oral, Daily, Carline Coffey MD, 75 mg at 11/12/21 0830  •  [MAR Hold] cyclobenzaprine (FLEXERIL) tablet 10 mg, 10 mg, Oral, BID PRN, Carline Coffey MD  •  [MAR Hold] dextrose (D50W) (25 g/50 mL) IV injection 25 g, 25 g, Intravenous, Q15 Min PRN, Carline Coffey MD  •  [MAR Hold] dextrose (GLUTOSE) oral gel 15 g, 15 g, Oral, Q15 Min PRN, Carline Coffey MD  •  [MAR Hold] DULoxetine (CYMBALTA) DR capsule 20 mg, 20 mg, Oral, Daily, Carline Coffey MD, 20 mg at 11/12/21 0830  •  fentaNYL citrate (PF) (SUBLIMAZE) injection, , , PRN, Niall Rios MD, 25 mcg at 11/13/21 0859  •  [MAR Hold] ferrous sulfate EC tablet 324 mg, 324 mg, Oral, Daily With Breakfast, Carline Coffey MD, 324 mg at 11/12/21 0831  •  [MAR Hold] fluticasone (FLONASE) 50 MCG/ACT nasal spray 2 spray, 2 spray, Each Nare, Daily, Charlene Castro MD, 2 spray at 11/11/21 0802  •  gabapentin (NEURONTIN) capsule 400 mg, 400 mg, Oral, TID, Carline Coffey MD, 400 mg at 11/12/21 2123  •  [MAR Hold] glucagon (human recombinant) (GLUCAGEN DIAGNOSTIC) injection 1 mg, 1 mg, Subcutaneous, Q15 Min PRN, Carline Coffey MD  •  [MAR Hold] heparin (porcine) 5000 UNIT/ML injection 5,000 Units, 5,000 Units, Subcutaneous, Q12H, Carline Coffey MD, 5,000 Units at 11/12/21 2124  •  [MAR Hold] heparin (porcine) injection 2,000 Units, 2,000 Units, Intracatheter, PRN, Ace Lauren MD, 2,000 Units at 11/11/21 1253  •  [MAR Hold] heparin (porcine) injection 2,000 Units, 2,000 Units, Intracatheter, PRN, Ace Lauren MD, 2,000 Units at 11/11/21 1251  •  heparin 1000 units in sodium chloride 0.9% IV infusion, , , PRN, Niall Rios MD, 1,000 mL at 11/13/21 0828  •  heparin 5000 units in sodium chloride 0.9% IV infusion, , , PRN, Niall Rios MD, 500 mL at 11/13/21 0828  •  [MAR Hold] influenza vac split quad  (FLUZONE,FLUARIX,AFLURIA,FLULAVAL) injection 0.5 mL, 0.5 mL, Intramuscular, During Hospitalization, Kit Brar MD  •  [MAR Hold] insulin aspart (novoLOG) injection 3-15 Units, 3-15 Units, Subcutaneous, 4x Daily AC & at Bedtime, Amor Rivera MD, 12 Units at 11/12/21 2139  •  [MAR Hold] insulin aspart (novoLOG) injection 3-15 Units, 3-15 Units, Subcutaneous, Q24H, Amor Rivera MD  •  [MAR Hold] insulin aspart (novoLOG) injection 8-40 Units, 8-40 Units, Subcutaneous, TID With Meals, Amor Rivera MD  •  [MAR Hold] insulin detemir (LEVEMIR) injection 60 Units, 60 Units, Subcutaneous, Q12H, Amor Rivera MD  •  [MAR Hold] ipratropium-albuterol (DUO-NEB) nebulizer solution 3 mL, 3 mL, Nebulization, 4x Daily - RT, Carline Coffey MD, 3 mL at 11/13/21 0639  •  [MAR Hold] isosorbide mononitrate (IMDUR) 24 hr tablet 30 mg, 30 mg, Oral, QAM, Carline Coffey MD, 30 mg at 11/12/21 0623  •  [MAR Hold] levothyroxine (SYNTHROID, LEVOTHROID) tablet 25 mcg, 25 mcg, Oral, Daily, Carline Coffey MD, 25 mcg at 11/12/21 0831  •  [MAR Hold] lidocaine (LIDODERM) 5 % 1 patch, 1 patch, Transdermal, Q24H, Carline Coffey MD, 1 patch at 11/12/21 0831  •  lidocaine (XYLOCAINE) 2% injection, , , PRN, Niall Rios MD, 20 mL at 11/13/21 0910  •  lisinopril (PRINIVIL,ZESTRIL) tablet 10 mg, 10 mg, Oral, Daily, Carline Coffey MD, 10 mg at 11/12/21 0830  •  [MAR Hold] melatonin tablet 1.5 mg, 1.5 mg, Oral, Nightly PRN, Carline Coffey MD  •  metoprolol tartrate (LOPRESSOR) tablet 50 mg, 50 mg, Oral, Q12H, Carline Coffey MD, 50 mg at 11/12/21 2124  •  midazolam (VERSED) injection, , , PRN, Niall Rios MD, 1 mg at 11/13/21 0859  •  [MAR Hold] montelukast (SINGULAIR) tablet 10 mg, 10 mg, Oral, Nightly, Carline Coffey MD, 10 mg at 11/12/21 2124  •  [MAR Hold] morphine injection 1 mg, 1 mg, Intravenous, Q4H PRN, 1 mg at 11/12/21 2124 **AND** [MAR Hold] naloxone (NARCAN)  injection 0.4 mg, 0.4 mg, Intravenous, Q5 Min PRN, Carline Coffey MD  •  [MAR Hold] nitroglycerin (NITROSTAT) SL tablet 0.4 mg, 0.4 mg, Sublingual, Q5 Min PRN, Carline Coffey MD  •  [MAR Hold] nystatin (MYCOSTATIN) powder, , Topical, TID, Carline Coffey MD, Given at 11/12/21 1557  •  [MAR Hold] ondansetron (ZOFRAN) injection 4 mg, 4 mg, Intravenous, Q6H PRN, Carline Coffey MD  •  [MAR Hold] ondansetron ODT (ZOFRAN-ODT) disintegrating tablet 4 mg, 4 mg, Oral, Q8H PRN, Carline Coffey MD  •  [MAR Hold] oxybutynin XL (DITROPAN-XL) 24 hr tablet 5 mg, 5 mg, Oral, Daily, Carline Coffey MD, 5 mg at 11/12/21 0830  •  [MAR Hold] pantoprazole (PROTONIX) EC tablet 40 mg, 40 mg, Oral, Nightly, Carline Coffey MD, 40 mg at 11/12/21 2123  •  [MAR Hold] promethazine (PHENERGAN) tablet 25 mg, 25 mg, Oral, Q6H PRN, Carline Coffey MD  •  [MAR Hold] ranolazine (RANEXA) 12 hr tablet 500 mg, 500 mg, Oral, Q12H, Katalina Argueta APRN, 500 mg at 11/12/21 2123  •  [MAR Hold] sennosides-docusate (PERICOLACE) 8.6-50 MG per tablet 2 tablet, 2 tablet, Oral, BID, Carline Coffey MD, 2 tablet at 11/12/21 0830  •  [MAR Hold] sodium chloride 0.9 % flush 10 mL, 10 mL, Intravenous, PRN, Carline Coffey MD  •  [MAR Hold] sodium chloride 0.9 % flush 10 mL, 10 mL, Intravenous, Q12H, Carline Coffey MD, 10 mL at 11/12/21 2127  •  [MAR Hold] sodium chloride 0.9 % flush 10 mL, 10 mL, Intravenous, PRN, Carline Coffey MD  •  sodium chloride 0.9 % flush 10 mL, 10 mL, Intravenous, PRN, Niall Rios MD  •  sodium chloride 0.9 % flush 3 mL, 3 mL, Intravenous, Q12H, Niall Rios MD  •  sodium chloride 0.9 % infusion, 75 mL/hr, Intravenous, Continuous, Niall Rios MD, Last Rate: 75 mL/hr at 11/13/21 0625, 75 mL/hr at 11/13/21 0625     Diagnostic Data    Lab Results (last 24 hours)     Procedure Component Value Units Date/Time    Protime-INR [[id number]]  (Normal) Collected: 11/13/21 0536    Specimen: Blood Updated:  11/13/21 0633     Protime 13.0 Seconds      INR 0.99    Narrative:      Therapeutic range for most indications is 2.0-3.0 INR,  or 2.5-3.5 for mechanical heart valves.    Comprehensive Metabolic Panel [[id number]]  (Abnormal) Collected: 11/13/21 0536    Specimen: Blood Updated: 11/13/21 0627     Glucose 363 mg/dL      BUN 52 mg/dL      Creatinine 4.06 mg/dL      Sodium 127 mmol/L      Potassium 4.5 mmol/L      Chloride 92 mmol/L      CO2 22.0 mmol/L      Calcium 9.1 mg/dL      Total Protein 7.2 g/dL      Albumin 3.10 g/dL      ALT (SGPT) 9 U/L      AST (SGOT) 10 U/L      Alkaline Phosphatase 160 U/L      Total Bilirubin 0.2 mg/dL      eGFR Non African Amer 11 mL/min/1.73      Comment: <15 Indicative of kidney failure.        eGFR   Amer --     Comment: <15 Indicative of kidney failure.        Globulin 4.1 gm/dL      A/G Ratio 0.8 g/dL      BUN/Creatinine Ratio 12.8     Anion Gap 13.0 mmol/L     Narrative:      GFR Normal >60  Chronic Kidney Disease <60  Kidney Failure <15      CBC & Differential [[id number]]  (Abnormal) Collected: 11/13/21 0536    Specimen: Blood Updated: 11/13/21 0556    Narrative:      The following orders were created for panel order CBC & Differential.  Procedure                               Abnormality         Status                     ---------                               -----------         ------                     CBC Auto Differential[[id number]]        Abnormal            Final result                 Please view results for these tests on the individual orders.    CBC Auto Differential [[id number]]  (Abnormal) Collected: 11/13/21 0536    Specimen: Blood Updated: 11/13/21 0556     WBC 7.65 10*3/mm3      RBC 2.82 10*6/mm3      Hemoglobin 7.7 g/dL      Hematocrit 23.1 %      MCV 81.9 fL      MCH 27.3 pg      MCHC 33.3 g/dL      RDW 14.9 %      RDW-SD 43.9 fl      MPV 8.9 fL      Platelets 273 10*3/mm3      Neutrophil % 62.7 %      Lymphocyte % 23.9 %      Monocyte % 6.8 %       Eosinophil % 3.8 %      Basophil % 1.0 %      Immature Grans % 1.8 %      Neutrophils, Absolute 4.79 10*3/mm3      Lymphocytes, Absolute 1.83 10*3/mm3      Monocytes, Absolute 0.52 10*3/mm3      Eosinophils, Absolute 0.29 10*3/mm3      Basophils, Absolute 0.08 10*3/mm3      Immature Grans, Absolute 0.14 10*3/mm3      nRBC 0.0 /100 WBC     POC Glucose Once [[id number]]  (Abnormal) Collected: 11/12/21 1610    Specimen: Blood Updated: 11/12/21 1636     Glucose 253 mg/dL      Comment: RN NotifiedOperator: [id number] JUAQUIN CONTRERASNIFERMeter ID: [id number]               I reviewed the patient's new clinical results.    Assessment/Plan:     Active Hospital Problems    Diagnosis  POA   • **Chest pain [R07.9]  Yes     -S/P CABG x 3 (Primary), Bilateral carotid artery stenosis w/ 2 stents on left side,  PAD (peripheral artery disease) with stent in right upper leg, Morbid obesity   -Continue aspirin, Plavix, atorvastatin, lisinopril,  isosorbide mononitrate 60 mg daily     -EKG: No ST segment changes.   -Troponin negative  -currently undergoing cardiac cath      • Anemia [D64.9]  Unknown     -Anemia of chronic disease vs GI bleed  -No active bleeding on recent EGD/Colonoscopy  -Recent capsule endoscopy which showed few small nonbleeding intestinal AVMs and single small intestinal polyp which GI planned to follow outpatient  -Continue to monitor H/H  -Will transfuse if Hgb <8  -Continue home ferrous sulfate. May be a candidate for epogen and IV iron  -Follow stool occult test     • Type 2 diabetes mellitus with hyperglycemia (HCC) [E11.65]  Unknown     -Continue 20 units of levemir in the morning , 30 units of novolog with meals, and 50 units of levemir at night  -SSI for additional coverage     • Pneumonitis [J18.9]  Unknown   • Acute on chronic congestive heart failure (HCC) [I50.9]  Yes     - Continue Imdur, metoprolol, lisinopril      • CKD (chronic kidney disease), symptom management only, stage 5 (HCC) [N18.5]  Yes      Results from last 7 days   Lab Units 11/10/21  0538 11/09/21  0608 11/08/21  1450   CREATININE mg/dL 3.49* 3.16* 3.27*     -Completes outpatient HD on TTS  -Nephrology on board. Will require HD in house     • COPD (chronic obstructive pulmonary disease) ([facility]) [J44.9]  Yes     -O2 supplementation  -Home inhalers as needed  -DuoNebs  -CPAP at night          DVT prophylaxis: Heparin  Code status is   Code Status and Medical Interventions:   Ordered at: 11/08/21 1734     Code Status (Patient has no pulse and is not breathing):    CPR (Attempt to Resuscitate)     Medical Interventions (Patient has pulse or is breathing):    Full Support       Plan for disposition:To place of residence in 1-2 days       Time: 15 min         This document has been electronically signed by Alvarado Mauricio MD on November 13, 2021 09:54 CST    Part of the note may be an electronic transcription or translation of spoken language to printed text using the Dragon Dictation System

## 2021-11-13 NOTE — NURSING NOTE
Informed Pt's  at this time that she will be moving to the ICU post cath lab. AM meds not administered this AM r/t pt being NPO for Heart Cath.

## 2021-11-13 NOTE — NURSING NOTE
Pt had a nose bleed for approximately 2-3minutes. Aqua pack applied to oxygen. Will continue to monitor.

## 2021-11-14 LAB
ALBUMIN SERPL-MCNC: 3.5 G/DL (ref 3.5–5.2)
ALBUMIN/GLOB SERPL: 0.8 G/DL
ALP SERPL-CCNC: 114 U/L (ref 39–117)
ALT SERPL W P-5'-P-CCNC: 8 U/L (ref 1–33)
ANION GAP SERPL CALCULATED.3IONS-SCNC: 13 MMOL/L (ref 5–15)
ANION GAP SERPL CALCULATED.3IONS-SCNC: 9 MMOL/L (ref 5–15)
AST SERPL-CCNC: 17 U/L (ref 1–32)
BASOPHILS # BLD AUTO: 0.08 10*3/MM3 (ref 0–0.2)
BASOPHILS NFR BLD AUTO: 1.1 % (ref 0–1.5)
BILIRUB SERPL-MCNC: 0.2 MG/DL (ref 0–1.2)
BUN SERPL-MCNC: 23 MG/DL (ref 8–23)
BUN SERPL-MCNC: 26 MG/DL (ref 8–23)
BUN/CREAT SERPL: 8.8 (ref 7–25)
BUN/CREAT SERPL: 9.4 (ref 7–25)
CALCIUM SPEC-SCNC: 9.1 MG/DL (ref 8.6–10.5)
CALCIUM SPEC-SCNC: 9.2 MG/DL (ref 8.6–10.5)
CHLORIDE SERPL-SCNC: 96 MMOL/L (ref 98–107)
CHLORIDE SERPL-SCNC: 96 MMOL/L (ref 98–107)
CO2 SERPL-SCNC: 26 MMOL/L (ref 22–29)
CO2 SERPL-SCNC: 28 MMOL/L (ref 22–29)
CREAT SERPL-MCNC: 2.61 MG/DL (ref 0.57–1)
CREAT SERPL-MCNC: 2.77 MG/DL (ref 0.57–1)
DEPRECATED RDW RBC AUTO: 46.2 FL (ref 37–54)
EOSINOPHIL # BLD AUTO: 0.31 10*3/MM3 (ref 0–0.4)
EOSINOPHIL NFR BLD AUTO: 4.1 % (ref 0.3–6.2)
ERYTHROCYTE [DISTWIDTH] IN BLOOD BY AUTOMATED COUNT: 15.6 % (ref 12.3–15.4)
GFR SERPL CREATININE-BSD FRML MDRD: 17 ML/MIN/1.73
GFR SERPL CREATININE-BSD FRML MDRD: 19 ML/MIN/1.73
GLOBULIN UR ELPH-MCNC: 4.2 GM/DL
GLUCOSE BLDC GLUCOMTR-MCNC: 104 MG/DL (ref 70–130)
GLUCOSE BLDC GLUCOMTR-MCNC: 109 MG/DL (ref 70–130)
GLUCOSE BLDC GLUCOMTR-MCNC: 110 MG/DL (ref 70–130)
GLUCOSE BLDC GLUCOMTR-MCNC: 115 MG/DL (ref 70–130)
GLUCOSE BLDC GLUCOMTR-MCNC: 135 MG/DL (ref 70–130)
GLUCOSE BLDC GLUCOMTR-MCNC: 142 MG/DL (ref 70–130)
GLUCOSE BLDC GLUCOMTR-MCNC: 151 MG/DL (ref 70–130)
GLUCOSE BLDC GLUCOMTR-MCNC: 158 MG/DL (ref 70–130)
GLUCOSE BLDC GLUCOMTR-MCNC: 187 MG/DL (ref 70–130)
GLUCOSE BLDC GLUCOMTR-MCNC: 254 MG/DL (ref 70–130)
GLUCOSE BLDC GLUCOMTR-MCNC: 387 MG/DL (ref 70–130)
GLUCOSE BLDC GLUCOMTR-MCNC: 98 MG/DL (ref 70–130)
GLUCOSE SERPL-MCNC: 136 MG/DL (ref 65–99)
GLUCOSE SERPL-MCNC: 92 MG/DL (ref 65–99)
HCT VFR BLD AUTO: 22 % (ref 34–46.6)
HCT VFR BLD AUTO: 22.5 % (ref 34–46.6)
HCT VFR BLD AUTO: 23.1 % (ref 34–46.6)
HGB BLD-MCNC: 7 G/DL (ref 12–15.9)
HGB BLD-MCNC: 7.3 G/DL (ref 12–15.9)
HGB BLD-MCNC: 7.5 G/DL (ref 12–15.9)
IMM GRANULOCYTES # BLD AUTO: 0.1 10*3/MM3 (ref 0–0.05)
IMM GRANULOCYTES NFR BLD AUTO: 1.3 % (ref 0–0.5)
LYMPHOCYTES # BLD AUTO: 1.86 10*3/MM3 (ref 0.7–3.1)
LYMPHOCYTES NFR BLD AUTO: 24.6 % (ref 19.6–45.3)
MCH RBC QN AUTO: 27.1 PG (ref 26.6–33)
MCHC RBC AUTO-ENTMCNC: 32.4 G/DL (ref 31.5–35.7)
MCV RBC AUTO: 83.6 FL (ref 79–97)
MONOCYTES # BLD AUTO: 0.6 10*3/MM3 (ref 0.1–0.9)
MONOCYTES NFR BLD AUTO: 7.9 % (ref 5–12)
NEUTROPHILS NFR BLD AUTO: 4.61 10*3/MM3 (ref 1.7–7)
NEUTROPHILS NFR BLD AUTO: 61 % (ref 42.7–76)
NRBC BLD AUTO-RTO: 0 /100 WBC (ref 0–0.2)
PLATELET # BLD AUTO: 312 10*3/MM3 (ref 140–450)
PMV BLD AUTO: 8.9 FL (ref 6–12)
POTASSIUM SERPL-SCNC: 4 MMOL/L (ref 3.5–5.2)
POTASSIUM SERPL-SCNC: 4.3 MMOL/L (ref 3.5–5.2)
PROT SERPL-MCNC: 7.7 G/DL (ref 6–8.5)
RBC # BLD AUTO: 2.69 10*6/MM3 (ref 3.77–5.28)
SODIUM SERPL-SCNC: 133 MMOL/L (ref 136–145)
SODIUM SERPL-SCNC: 135 MMOL/L (ref 136–145)
WBC # BLD AUTO: 7.56 10*3/MM3 (ref 3.4–10.8)

## 2021-11-14 PROCEDURE — 94760 N-INVAS EAR/PLS OXIMETRY 1: CPT

## 2021-11-14 PROCEDURE — 94799 UNLISTED PULMONARY SVC/PX: CPT

## 2021-11-14 PROCEDURE — 63710000001 INSULIN ASPART PER 5 UNITS: Performed by: STUDENT IN AN ORGANIZED HEALTH CARE EDUCATION/TRAINING PROGRAM

## 2021-11-14 PROCEDURE — 82962 GLUCOSE BLOOD TEST: CPT

## 2021-11-14 PROCEDURE — 25010000002 HEPARIN (PORCINE) PER 1000 UNITS: Performed by: STUDENT IN AN ORGANIZED HEALTH CARE EDUCATION/TRAINING PROGRAM

## 2021-11-14 PROCEDURE — 25010000002 MORPHINE PER 10 MG: Performed by: STUDENT IN AN ORGANIZED HEALTH CARE EDUCATION/TRAINING PROGRAM

## 2021-11-14 PROCEDURE — 85018 HEMOGLOBIN: CPT | Performed by: STUDENT IN AN ORGANIZED HEALTH CARE EDUCATION/TRAINING PROGRAM

## 2021-11-14 PROCEDURE — 85014 HEMATOCRIT: CPT | Performed by: STUDENT IN AN ORGANIZED HEALTH CARE EDUCATION/TRAINING PROGRAM

## 2021-11-14 PROCEDURE — 86900 BLOOD TYPING SEROLOGIC ABO: CPT

## 2021-11-14 PROCEDURE — P9016 RBC LEUKOCYTES REDUCED: HCPCS

## 2021-11-14 PROCEDURE — 63710000001 INSULIN ASPART PER 5 UNITS: Performed by: INTERNAL MEDICINE

## 2021-11-14 PROCEDURE — 36430 TRANSFUSION BLD/BLD COMPNT: CPT

## 2021-11-14 PROCEDURE — 80053 COMPREHEN METABOLIC PANEL: CPT | Performed by: INTERNAL MEDICINE

## 2021-11-14 PROCEDURE — 63710000001 INSULIN DETEMIR PER 5 UNITS: Performed by: INTERNAL MEDICINE

## 2021-11-14 PROCEDURE — 97168 OT RE-EVAL EST PLAN CARE: CPT

## 2021-11-14 PROCEDURE — 85025 COMPLETE CBC W/AUTO DIFF WBC: CPT | Performed by: STUDENT IN AN ORGANIZED HEALTH CARE EDUCATION/TRAINING PROGRAM

## 2021-11-14 RX ORDER — SODIUM CHLORIDE 9 MG/ML
INJECTION, SOLUTION INTRAVENOUS
Status: COMPLETED
Start: 2021-11-14 | End: 2021-11-14

## 2021-11-14 RX ORDER — LISINOPRIL 5 MG/1
5 TABLET ORAL DAILY
Status: DISCONTINUED | OUTPATIENT
Start: 2021-11-14 | End: 2021-11-15 | Stop reason: HOSPADM

## 2021-11-14 RX ORDER — NALOXONE HCL 0.4 MG/ML
0.4 VIAL (ML) INJECTION
Status: DISCONTINUED | OUTPATIENT
Start: 2021-11-14 | End: 2021-11-15 | Stop reason: HOSPADM

## 2021-11-14 RX ADMIN — SODIUM CHLORIDE 75 ML/HR: 9 INJECTION, SOLUTION INTRAVENOUS at 14:54

## 2021-11-14 RX ADMIN — OXYBUTYNIN CHLORIDE 5 MG: 5 TABLET, EXTENDED RELEASE ORAL at 08:59

## 2021-11-14 RX ADMIN — MORPHINE SULFATE 4 MG: 4 INJECTION, SOLUTION INTRAMUSCULAR; INTRAVENOUS at 14:33

## 2021-11-14 RX ADMIN — SODIUM CHLORIDE, PRESERVATIVE FREE 10 ML: 5 INJECTION INTRAVENOUS at 09:02

## 2021-11-14 RX ADMIN — MORPHINE SULFATE 1 MG: 2 INJECTION, SOLUTION INTRAMUSCULAR; INTRAVENOUS at 10:22

## 2021-11-14 RX ADMIN — DULOXETINE HYDROCHLORIDE 20 MG: 20 CAPSULE, DELAYED RELEASE ORAL at 08:58

## 2021-11-14 RX ADMIN — IPRATROPIUM BROMIDE AND ALBUTEROL SULFATE 3 ML: 2.5; .5 SOLUTION RESPIRATORY (INHALATION) at 14:58

## 2021-11-14 RX ADMIN — RANOLAZINE 500 MG: 500 TABLET, FILM COATED, EXTENDED RELEASE ORAL at 21:04

## 2021-11-14 RX ADMIN — INSULIN ASPART 20 UNITS: 100 INJECTION, SOLUTION INTRAVENOUS; SUBCUTANEOUS at 09:57

## 2021-11-14 RX ADMIN — PANTOPRAZOLE SODIUM 40 MG: 40 TABLET, DELAYED RELEASE ORAL at 21:04

## 2021-11-14 RX ADMIN — NYSTATIN: 100000 POWDER TOPICAL at 21:04

## 2021-11-14 RX ADMIN — ASPIRIN 81 MG: 81 TABLET, FILM COATED ORAL at 08:59

## 2021-11-14 RX ADMIN — CETIRIZINE HYDROCHLORIDE 10 MG: 10 TABLET, FILM COATED ORAL at 08:59

## 2021-11-14 RX ADMIN — GABAPENTIN 400 MG: 400 CAPSULE ORAL at 17:06

## 2021-11-14 RX ADMIN — FERROUS SULFATE TAB EC 324 MG (65 MG FE EQUIVALENT) 324 MG: 324 (65 FE) TABLET DELAYED RESPONSE at 08:58

## 2021-11-14 RX ADMIN — ACETAMINOPHEN 650 MG: 325 TABLET, FILM COATED ORAL at 11:46

## 2021-11-14 RX ADMIN — Medication 1.5 MG: at 21:04

## 2021-11-14 RX ADMIN — HEPARIN SODIUM 5000 UNITS: 5000 INJECTION INTRAVENOUS; SUBCUTANEOUS at 09:01

## 2021-11-14 RX ADMIN — NYSTATIN: 100000 POWDER TOPICAL at 09:01

## 2021-11-14 RX ADMIN — ATORVASTATIN CALCIUM 20 MG: 20 TABLET, FILM COATED ORAL at 08:59

## 2021-11-14 RX ADMIN — IPRATROPIUM BROMIDE AND ALBUTEROL SULFATE 3 ML: 2.5; .5 SOLUTION RESPIRATORY (INHALATION) at 10:57

## 2021-11-14 RX ADMIN — METOPROLOL TARTRATE 50 MG: 50 TABLET, FILM COATED ORAL at 08:59

## 2021-11-14 RX ADMIN — LIDOCAINE 1 PATCH: 700 PATCH TOPICAL at 10:27

## 2021-11-14 RX ADMIN — DOCUSATE SODIUM 50 MG AND SENNOSIDES 8.6 MG 2 TABLET: 8.6; 5 TABLET, FILM COATED ORAL at 21:04

## 2021-11-14 RX ADMIN — INSULIN ASPART 9 UNITS: 100 INJECTION, SOLUTION INTRAVENOUS; SUBCUTANEOUS at 11:51

## 2021-11-14 RX ADMIN — INSULIN ASPART 3 UNITS: 100 INJECTION, SOLUTION INTRAVENOUS; SUBCUTANEOUS at 21:40

## 2021-11-14 RX ADMIN — INSULIN ASPART 10 UNITS: 100 INJECTION, SOLUTION INTRAVENOUS; SUBCUTANEOUS at 13:16

## 2021-11-14 RX ADMIN — INSULIN ASPART 3 UNITS: 100 INJECTION, SOLUTION INTRAVENOUS; SUBCUTANEOUS at 09:25

## 2021-11-14 RX ADMIN — IPRATROPIUM BROMIDE AND ALBUTEROL SULFATE 3 ML: 2.5; .5 SOLUTION RESPIRATORY (INHALATION) at 08:12

## 2021-11-14 RX ADMIN — CLOPIDOGREL BISULFATE 75 MG: 75 TABLET ORAL at 08:59

## 2021-11-14 RX ADMIN — HEPARIN SODIUM 5000 UNITS: 5000 INJECTION INTRAVENOUS; SUBCUTANEOUS at 21:04

## 2021-11-14 RX ADMIN — GABAPENTIN 400 MG: 400 CAPSULE ORAL at 21:04

## 2021-11-14 RX ADMIN — METOPROLOL TARTRATE 50 MG: 50 TABLET, FILM COATED ORAL at 21:04

## 2021-11-14 RX ADMIN — GABAPENTIN 400 MG: 400 CAPSULE ORAL at 09:00

## 2021-11-14 RX ADMIN — DOCUSATE SODIUM 50 MG AND SENNOSIDES 8.6 MG 2 TABLET: 8.6; 5 TABLET, FILM COATED ORAL at 08:59

## 2021-11-14 RX ADMIN — INSULIN DETEMIR 50 UNITS: 100 INJECTION, SOLUTION SUBCUTANEOUS at 07:25

## 2021-11-14 RX ADMIN — IPRATROPIUM BROMIDE AND ALBUTEROL SULFATE 3 ML: 2.5; .5 SOLUTION RESPIRATORY (INHALATION) at 19:25

## 2021-11-14 RX ADMIN — NYSTATIN: 100000 POWDER TOPICAL at 17:06

## 2021-11-14 RX ADMIN — BUMETANIDE 2 MG: 1 TABLET ORAL at 08:59

## 2021-11-14 RX ADMIN — INSULIN ASPART 15 UNITS: 100 INJECTION, SOLUTION INTRAVENOUS; SUBCUTANEOUS at 18:05

## 2021-11-14 RX ADMIN — RANOLAZINE 500 MG: 500 TABLET, FILM COATED, EXTENDED RELEASE ORAL at 08:59

## 2021-11-14 RX ADMIN — LEVOTHYROXINE SODIUM 25 MCG: 25 TABLET ORAL at 08:59

## 2021-11-14 RX ADMIN — LISINOPRIL 5 MG: 5 TABLET ORAL at 09:00

## 2021-11-14 RX ADMIN — ISOSORBIDE MONONITRATE 30 MG: 30 TABLET, EXTENDED RELEASE ORAL at 06:27

## 2021-11-14 RX ADMIN — MONTELUKAST 10 MG: 10 TABLET, FILM COATED ORAL at 21:04

## 2021-11-14 NOTE — PROGRESS NOTES
"[facility]  [address] [place] [address]. [address]  T - [phone]  F - [phone]     Progress Note          PATIENT  DEMOGRAPHICS   PATIENT NAME: Yamileth Slater                      PHYSICIAN: Ace Lauren MD  : 1960  MRN: [id number]   LOS: 2 days    Patient Care Team:  Rianna Macias MD as PCP - General (Family Medicine)  Subjective   SUBJECTIVE   Events reviewed - transferred to icu post cath. Was on insulin drip. Stable at present         Objective   OBJECTIVE   Vital Signs  Temp:  [98 °F (36.7 °C)-98.6 °F (37 °C)] 98.3 °F (36.8 °C)  Heart Rate:  [58-89] 60  Resp:  [16-17] 17  BP: ()/(44-85) 124/56    Flowsheet Rows      First Filed Value   Admission Height 157.5 cm (62\") Documented at 2021 1434   Admission Weight 128 kg (282 lb) Documented at 2021 1434           I/O last 3 completed shifts:  In: 393 [I.V.:393]  Out: 3200 [Urine:2000; Other:1200]    PHYSICAL EXAM    Physical Exam  Vitals reviewed.   Constitutional:       Appearance: Normal appearance.   HENT:      Nose: Nose normal.   Cardiovascular:      Rate and Rhythm: Normal rate and regular rhythm.      Pulses: Normal pulses.      Heart sounds: Normal heart sounds. No murmur heard.      Pulmonary:      Effort: Pulmonary effort is normal.      Breath sounds: No wheezing or rales.   Abdominal:      General: Abdomen is flat. There is no distension.      Palpations: Abdomen is soft.      Tenderness: There is no abdominal tenderness.   Musculoskeletal:         General: No swelling.   Skin:     Coloration: Skin is not jaundiced.      Findings: No erythema.   Neurological:      General: No focal deficit present.      Mental Status: She is alert. She is disoriented.         RESULTS   Results Review:    Results from last 7 days   Lab Units 21  0503 21  0041 21  1841 21  0536 21  0536 21  0531 21  0531   SODIUM mmol/L 135* 133* 133*   < > 127*   < > 127* "   POTASSIUM mmol/L 4.3 4.0 4.0   < > 4.5   < > 4.9   CHLORIDE mmol/L 96* 96* 95*   < > 92*   < > 92*   CO2 mmol/L 26.0 28.0 26.0   < > 22.0   < > 20.0*   BUN mg/dL 26* 23 21   < > 52*   < > 44*   CREATININE mg/dL 2.77* 2.61* 1.95*   < > 4.06*   < > 3.49*   CALCIUM mg/dL 9.1 9.2 9.0   < > 9.1   < > 8.9   BILIRUBIN mg/dL 0.2  --   --   --  0.2  --  <0.2   ALK PHOS U/L 114  --   --   --  160*  --  145*   ALT (SGPT) U/L 8  --   --   --  9  --  8   AST (SGOT) U/L 17  --   --   --  10  --  11   GLUCOSE mg/dL 136* 92 214*   < > 363*   < > 391*    < > = values in this interval not displayed.       Estimated Creatinine Clearance: 27.3 mL/min (A) (by C-G formula based on SCr of 2.77 mg/dL (H)).                Results from last 7 days   Lab Units 11/14/21  0503 11/14/21  0041 11/13/21  1733 11/13/21  0536 11/12/21  0531 11/11/21  0600 11/11/21  0600 11/10/21  0538 11/10/21  0538   WBC 10*3/mm3 7.56  --   --  7.65 7.06  --  8.15  --  8.41   HEMOGLOBIN g/dL 7.3* 7.5* 8.0* 7.7* 8.4*   < > 8.2*   < > 8.5*   PLATELETS 10*3/mm3 312  --   --  273 265  --  267  --  288    < > = values in this interval not displayed.       Results from last 7 days   Lab Units 11/13/21  0536   INR  0.99         Imaging Results (Last 24 Hours)     ** No results found for the last 24 hours. **           MEDICATIONS    aspirin, 81 mg, Oral, Daily  atorvastatin, 20 mg, Oral, Daily  bumetanide, 2 mg, Oral, Once per day on Sun Mon Wed Fri  cetirizine, 10 mg, Oral, Daily  clopidogrel, 75 mg, Oral, Daily  DULoxetine, 20 mg, Oral, Daily  ferrous sulfate, 324 mg, Oral, Daily With Breakfast  fluticasone, 2 spray, Each Nare, Daily  gabapentin, 400 mg, Oral, TID  heparin (porcine), 5,000 Units, Subcutaneous, Q12H  insulin aspart, 3-12 Units, Subcutaneous, 4x Daily AC & at Bedtime  [START ON 11/15/2021] insulin aspart, 3-12 Units, Subcutaneous, Q24H  insulin aspart, 5-20 Units, Subcutaneous, TID With Meals  insulin detemir, 50 Units, Subcutaneous,  Daily  ipratropium-albuterol, 3 mL, Nebulization, 4x Daily - RT  isosorbide mononitrate, 30 mg, Oral, QAM  levothyroxine, 25 mcg, Oral, Daily  lidocaine, 1 patch, Transdermal, Q24H  lisinopril, 10 mg, Oral, Daily  metoprolol tartrate, 50 mg, Oral, Q12H  montelukast, 10 mg, Oral, Nightly  nystatin, , Topical, TID  oxybutynin XL, 5 mg, Oral, Daily  pantoprazole, 40 mg, Oral, Nightly  ranolazine, 500 mg, Oral, Q12H  sennosides-docusate, 2 tablet, Oral, BID  sodium chloride, 10 mL, Intravenous, Q12H      sodium chloride, 75 mL/hr, Last Rate: Stopped (11/13/21 1756)        Assessment/Plan   ASSESSMENT / PLAN      Chest pain    COPD (chronic obstructive pulmonary disease) (HCC)    CKD (chronic kidney disease), symptom management only, stage 5 (HCC)    Acute on chronic congestive heart failure (HCC)    Anemia    Type 2 diabetes mellitus with hyperglycemia ([facility])    Pneumonitis    1.  ESRD on HD- Initiated HD on 10/21 due to hyperkalemia and fluid overload, now likely ESRD.  HD TTS now. hd next on Saturday. Close to  1.2L removed yesterday    2.  Hyponatremia- Na <130. Modulate with HD     3.  Chest pain- tress test shows small-sized, mildly severe area of ischemia located in the basal inferior lateral wall. Pronto thrombectomy of the left main coronary artery.  PTCA and stenting of the distal left main and ostial circumflex artery with drug-eluting stent.     4.  History of CAD     5.  History of COPD - On trilogy at night but left it at home     6.  Anemia / recent GI bleed / b12 deficiency-  s/p capsule endoscopy which showed few small non-bleeding intestinal AVMs and single small polyp. Keep b12. Keep folate. Check Fe. epogen with hd     7. 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 HTN- Continue home antihypertensives. Low dose lisinopril.                This document has been electronically signed by Ace Lauren MD on November 14, 2021 07:39 CST

## 2021-11-14 NOTE — SIGNIFICANT NOTE
11/14/21 1319   OTHER   Discipline physical therapy assistant   Rehab Time/Intention   Session Not Performed unable to treat, medical status change  (Pt. will need R/S orders or PT/OT after t/f to CCU)

## 2021-11-14 NOTE — SIGNIFICANT NOTE
Based on this patient's hemoglobin, we believe that a blood transfusion is necessary.  I discussed this with the patient.  She said she has had blood transfusions in the past without issue.  I explained the reasons why she was indicated to have this transfusion and discussed possible side effects such as dizziness or in rare cases anaphylactic reaction. We also discussed the benefits. The patient states that she understood this was a possibility and consented to have the transfusion.      This document has been electronically signed by Alvarado Mauricio MD on November 14, 2021 12:16 CST

## 2021-11-14 NOTE — PROGRESS NOTES
FAMILY MEDICINE DAILY PROGRESS NOTE    NAME: Yamileth Slater  : 1960  MRN: [id number]      LOS: 2 days     PROVIDER OF SERVICE: Alvarado Mauricio MD    Chief Complaint: Chest pain    Subjective:     Interval History:  History taken from: patient    Afebrile. Vital signs stable.    Patient status post 1 day from left heart catheterization with percutaneous intervention. Patient also completed hemodialysis yesterday.    Endocrinologist placed patient on insulin drip overnight and now transitioning to subcutaneous insulin.  Will be doing a carb count today.    Hemoglobin 7.3 this morning trending down from 7.5 yesterday and 8 prior to her left heart catheterization, in which she had a hematoma.  We will continue to trend and transfuse if necessary.    Patient has no complaints today other than groin soreness from her procedure and hunger. Her chest pain has resolved.    Restarting patient's diet and transferring to the floor.      Review of Systems:   Review of Systems   Constitutional: Positive for fatigue.   Respiratory: Negative for shortness of breath and wheezing.    Cardiovascular: Negative for chest pain and leg swelling.   Gastrointestinal: Negative for nausea and vomiting.   Musculoskeletal:        + Groin soreness status post left heart catheterization   Neurological: Negative for weakness and light-headedness.       Objective:     Vital Signs  Temp:  [98 °F (36.7 °C)-98.6 °F (37 °C)] 98.3 °F (36.8 °C)  Heart Rate:  [58-89] 62  Resp:  [16-18] 16  BP: ()/(44-85) 114/54  Body mass index is 52.22 kg/m².    Physical Exam  Physical Exam  Constitutional:       General: She is not in acute distress.     Appearance: She is obese. She is not toxic-appearing.      Comments: Patient on nasal cannula   HENT:      Head: Normocephalic and atraumatic.   Cardiovascular:      Rate and Rhythm: Normal rate and regular rhythm.   Pulmonary:      Effort: Pulmonary effort is normal.      Breath sounds: Normal  breath sounds.   Musculoskeletal:      Cervical back: Normal range of motion.      Right lower leg: No edema.      Left lower leg: No edema.   Neurological:      Mental Status: She is alert.         Medication Review    Current Facility-Administered Medications:   •  acetaminophen (TYLENOL) tablet 650 mg, 650 mg, Oral, Q8H PRN, Carline Coffey MD, 650 mg at 11/10/21 2214  •  albuterol (PROVENTIL) nebulizer solution 0.083% 2.5 mg/3mL, 2.5 mg, Nebulization, Q4H PRN, Carline Coffey MD  •  aspirin EC tablet 81 mg, 81 mg, Oral, Daily, Carline Coffey MD, 81 mg at 11/12/21 0830  •  atorvastatin (LIPITOR) tablet 20 mg, 20 mg, Oral, Daily, Carline Coffey MD, 20 mg at 11/12/21 0831  •  bumetanide (BUMEX) tablet 2 mg, 2 mg, Oral, Once per day on Sun Mon Wed Fri, Carline Coffey MD, 2 mg at 11/12/21 0830  •  cetirizine (zyrTEC) tablet 10 mg, 10 mg, Oral, Daily, Carline Coffey MD, 10 mg at 11/12/21 0830  •  clopidogrel (PLAVIX) tablet 75 mg, 75 mg, Oral, Daily, Carline Coffey MD, 75 mg at 11/12/21 0830  •  cyclobenzaprine (FLEXERIL) tablet 10 mg, 10 mg, Oral, BID PRN, Carline Coffey MD  •  dextrose (D50W) (25 g/50 mL) IV injection 25 g, 25 g, Intravenous, Q15 Min PRN, Carline Coffey MD  •  dextrose (GLUTOSE) oral gel 15 g, 15 g, Oral, Q15 Min PRN, Carline Coffey MD  •  DULoxetine (CYMBALTA) DR capsule 20 mg, 20 mg, Oral, Daily, Carline Coffey MD, 20 mg at 11/12/21 0830  •  [START ON 11/16/2021] epoetin hubert-epbx (RETACRIT) injection 10,000 Units, 10,000 Units, Intravenous, Once per day on Tue Thu Xin Haney Imran, MD  •  ferrous sulfate EC tablet 324 mg, 324 mg, Oral, Daily With Breakfast, Carline Coffey MD, 324 mg at 11/12/21 0831  •  fluticasone (FLONASE) 50 MCG/ACT nasal spray 2 spray, 2 spray, Each Nare, Daily, Carline Coffey MD, 2 spray at 11/11/21 0802  •  gabapentin (NEURONTIN) capsule 400 mg, 400 mg, Oral, TID, Carline Coffey MD, 400 mg at 11/13/21 2048  •  glucagon (human  recombinant) (GLUCAGEN DIAGNOSTIC) injection 1 mg, 1 mg, Subcutaneous, Q15 Min PRN, Carline Coffey MD  •  heparin (porcine) 5000 UNIT/ML injection 5,000 Units, 5,000 Units, Subcutaneous, Q12H, Carline Coffey MD, 5,000 Units at 11/13/21 2047  •  heparin (porcine) injection 2,000 Units, 2,000 Units, Intracatheter, PRN, Carline Coffey MD, 2,000 Units at 11/13/21 1757  •  heparin (porcine) injection 2,000 Units, 2,000 Units, Intracatheter, PRN, Carline Coffey MD, 2,000 Units at 11/13/21 1755  •  influenza vac split quad (FLUZONE,FLUARIX,AFLURIA,FLULAVAL) injection 0.5 mL, 0.5 mL, Intramuscular, During Hospitalization, Carline Coffey MD  •  insulin aspart (novoLOG) injection 3-12 Units, 3-12 Units, Subcutaneous, 4x Daily AC & at Bedtime, Amor Rivera MD  •  [START ON 11/15/2021] insulin aspart (novoLOG) injection 3-12 Units, 3-12 Units, Subcutaneous, Q24H, Amor Rivera MD  •  insulin aspart (novoLOG) injection 5-20 Units, 5-20 Units, Subcutaneous, TID With Meals, Amor Rivera MD  •  insulin detemir (LEVEMIR) injection 50 Units, 50 Units, Subcutaneous, Daily, Amor Rivera MD, 50 Units at 11/14/21 0725  •  ipratropium-albuterol (DUO-NEB) nebulizer solution 3 mL, 3 mL, Nebulization, 4x Daily - RT, Carline Coffey MD, 3 mL at 11/14/21 0812  •  isosorbide mononitrate (IMDUR) 24 hr tablet 30 mg, 30 mg, Oral, QAM, Carline Coffey MD, 30 mg at 11/14/21 0627  •  levothyroxine (SYNTHROID, LEVOTHROID) tablet 25 mcg, 25 mcg, Oral, Daily, Carline Coffey MD, 25 mcg at 11/12/21 0831  •  lidocaine (LIDODERM) 5 % 1 patch, 1 patch, Transdermal, Q24H, Carline Coffey MD, 1 patch at 11/12/21 0831  •  lisinopril (PRINIVIL,ZESTRIL) tablet 5 mg, 5 mg, Oral, Daily, Ace Lauren MD  •  melatonin tablet 1.5 mg, 1.5 mg, Oral, Nightly PRN, Carline Coffey MD  •  metoprolol tartrate (LOPRESSOR) tablet 50 mg, 50 mg, Oral, Q12H, Carline Coffey MD, 50 mg at 11/13/21  2047  •  montelukast (SINGULAIR) tablet 10 mg, 10 mg, Oral, Nightly, Carline Coffey MD, 10 mg at 11/13/21 2047  •  morphine injection 1 mg, 1 mg, Intravenous, Q4H PRN, 1 mg at 11/12/21 2124 **AND** naloxone (NARCAN) injection 0.4 mg, 0.4 mg, Intravenous, Q5 Min PRN, Carline Coffey MD  •  nitroglycerin (NITROSTAT) SL tablet 0.4 mg, 0.4 mg, Sublingual, Q5 Min PRN, Carline Coffey MD  •  nystatin (MYCOSTATIN) powder, , Topical, TID, Carline Coffey MD, Given at 11/13/21 2341  •  ondansetron (ZOFRAN) injection 4 mg, 4 mg, Intravenous, Q6H PRN, Carline Coffey MD  •  ondansetron ODT (ZOFRAN-ODT) disintegrating tablet 4 mg, 4 mg, Oral, Q8H PRN, Carline Coffey MD  •  oxybutynin XL (DITROPAN-XL) 24 hr tablet 5 mg, 5 mg, Oral, Daily, Carline Coffey MD, 5 mg at 11/12/21 0830  •  pantoprazole (PROTONIX) EC tablet 40 mg, 40 mg, Oral, Nightly, Carline Coffey MD, 40 mg at 11/13/21 2047  •  promethazine (PHENERGAN) tablet 25 mg, 25 mg, Oral, Q6H PRN, Carline Coffey MD  •  ranolazine (RANEXA) 12 hr tablet 500 mg, 500 mg, Oral, Q12H, Carline Coffey MD, 500 mg at 11/13/21 2047  •  sennosides-docusate (PERICOLACE) 8.6-50 MG per tablet 2 tablet, 2 tablet, Oral, BID, Carline Coffey MD, 2 tablet at 11/13/21 2047  •  sodium chloride 0.9 % flush 10 mL, 10 mL, Intravenous, PRN, Carline Coffey MD  •  sodium chloride 0.9 % flush 10 mL, 10 mL, Intravenous, Q12H, Carline Coffey MD, 10 mL at 11/12/21 2127  •  sodium chloride 0.9 % flush 10 mL, 10 mL, Intravenous, PRN, Carline Coffey MD  •  sodium chloride 0.9 % infusion, 75 mL/hr, Intravenous, Continuous, Carline Coffey MD, Stopped at 11/13/21 1758     Diagnostic Data    Lab Results (last 24 hours)     Procedure Component Value Units Date/Time    POC Glucose Once [[id number]]  (Abnormal) Collected: 11/14/21 0530    Specimen: Blood Updated: 11/14/21 0549     Glucose 142 mg/dL      Comment: RN NotifiedOperator: [id number] [facility]:  [id number]       POC Glucose Once [[id number]]  (Normal) Collected: 11/14/21 0431    Specimen: Blood Updated: 11/14/21 0549     Glucose 115 mg/dL      Comment: : [id number] THIERNO RIVASAMeter ID: [id number]       Comprehensive Metabolic Panel [[id number]]  (Abnormal) Collected: 11/14/21 0503    Specimen: Blood Updated: 11/14/21 0546     Glucose 136 mg/dL      BUN 26 mg/dL      Creatinine 2.77 mg/dL      Sodium 135 mmol/L      Potassium 4.3 mmol/L      Comment: Slight hemolysis detected by analyzer. Results may be affected.        Chloride 96 mmol/L      CO2 26.0 mmol/L      Calcium 9.1 mg/dL      Total Protein 7.7 g/dL      Albumin 3.50 g/dL      ALT (SGPT) 8 U/L      AST (SGOT) 17 U/L      Alkaline Phosphatase 114 U/L      Total Bilirubin 0.2 mg/dL      eGFR Non African Amer 17 mL/min/1.73      Globulin 4.2 gm/dL      A/G Ratio 0.8 g/dL      BUN/Creatinine Ratio 9.4     Anion Gap 13.0 mmol/L     Narrative:      GFR Normal >60  Chronic Kidney Disease <60  Kidney Failure <15      CBC & Differential [[id number]]  (Abnormal) Collected: 11/14/21 0503    Specimen: Blood Updated: 11/14/21 0533    Narrative:      The following orders were created for panel order CBC & Differential.  Procedure                               Abnormality         Status                     ---------                               -----------         ------                     CBC Auto Differential[[id number]]        Abnormal            Final result                 Please view results for these tests on the individual orders.    CBC Auto Differential [[id number]]  (Abnormal) Collected: 11/14/21 0503    Specimen: Blood Updated: 11/14/21 0533     WBC 7.56 10*3/mm3      RBC 2.69 10*6/mm3      Hemoglobin 7.3 g/dL      Hematocrit 22.5 %      MCV 83.6 fL      MCH 27.1 pg      MCHC 32.4 g/dL      RDW 15.6 %      RDW-SD 46.2 fl      MPV 8.9 fL      Platelets 312 10*3/mm3      Neutrophil % 61.0 %      Lymphocyte % 24.6 %      Monocyte % 7.9 %       Eosinophil % 4.1 %      Basophil % 1.1 %      Immature Grans % 1.3 %      Neutrophils, Absolute 4.61 10*3/mm3      Lymphocytes, Absolute 1.86 10*3/mm3      Monocytes, Absolute 0.60 10*3/mm3      Eosinophils, Absolute 0.31 10*3/mm3      Basophils, Absolute 0.08 10*3/mm3      Immature Grans, Absolute 0.10 10*3/mm3      nRBC 0.0 /100 WBC     POC Glucose Once [[id number]]  (Normal) Collected: 11/14/21 0329    Specimen: Blood Updated: 11/14/21 0342     Glucose 109 mg/dL      Comment: RN NotifiedOperator: [id number] TRACE COLEMANMeter ID: [id number]       POC Glucose Once [[id number]]  (Normal) Collected: 11/14/21 0233    Specimen: Blood Updated: 11/14/21 0256     Glucose 110 mg/dL      Comment: : [id number] THIERNO Project GreenSEAMeter ID: [id number]       POC Glucose Once [[id number]]  (Normal) Collected: 11/14/21 0133    Specimen: Blood Updated: 11/14/21 0202     Glucose 98 mg/dL      Comment: : [id number] Resolute NetworksSEAMeter ID: [id number]       Basic Metabolic Panel [[id number]]  (Abnormal) Collected: 11/14/21 0041    Specimen: Blood Updated: 11/14/21 0144     Glucose 92 mg/dL      BUN 23 mg/dL      Creatinine 2.61 mg/dL      Sodium 133 mmol/L      Potassium 4.0 mmol/L      Chloride 96 mmol/L      CO2 28.0 mmol/L      Calcium 9.2 mg/dL      eGFR Non African Amer 19 mL/min/1.73      BUN/Creatinine Ratio 8.8     Anion Gap 9.0 mmol/L     Narrative:      GFR Normal >60  Chronic Kidney Disease <60  Kidney Failure <15      Hemoglobin & Hematocrit, Blood [[id number]]  (Abnormal) Collected: 11/14/21 0041    Specimen: Blood Updated: 11/14/21 0139     Hemoglobin 7.5 g/dL      Hematocrit 23.1 %     POC Glucose Once [[id number]]  (Normal) Collected: 11/14/21 0031    Specimen: Blood Updated: 11/14/21 0043     Glucose 104 mg/dL      Comment: : [id number] THIERNO CHELSEAMeter ID: [id number]       POC Glucose Once [[id number]]  (Abnormal) Collected: 11/13/21 2313    Specimen: Blood Updated: 11/13/21 2326      Glucose 134 mg/dL      Comment: RN NotifiedOperator: [id number] TRACE SARAHMeter ID: [id number]       POC Glucose Once [[id number]]  (Abnormal) Collected: 11/13/21 2213    Specimen: Blood Updated: 11/13/21 2244     Glucose 164 mg/dL      Comment: RN NotifiedOperator: [id number] TRACE SARAHMeter ID: [id number]       POC Glucose Once [[id number]]  (Abnormal) Collected: 11/13/21 2123    Specimen: Blood Updated: 11/13/21 2244     Glucose 197 mg/dL      Comment: : [id number] THIERNO CHELSEAMeter ID: [id number]       POC Glucose Once [[id number]]  (Abnormal) Collected: 11/13/21 2028    Specimen: Blood Updated: 11/13/21 2042     Glucose 213 mg/dL      Comment: RN NotifiedOperator: [id number] GRICELDA MARQUISMeter ID: [id number]       POC Glucose Once [[id number]]  (Abnormal) Collected: 11/13/21 1929    Specimen: Blood Updated: 11/13/21 2042     Glucose 201 mg/dL      Comment: : [id number] THIERNO CHELSEAMeter ID: [id number]       Basic Metabolic Panel [[id number]]  (Abnormal) Collected: 11/13/21 1841    Specimen: Blood Updated: 11/13/21 1908     Glucose 214 mg/dL      BUN 21 mg/dL      Creatinine 1.95 mg/dL      Sodium 133 mmol/L      Potassium 4.0 mmol/L      Chloride 95 mmol/L      CO2 26.0 mmol/L      Calcium 9.0 mg/dL      eGFR Non African Amer 26 mL/min/1.73      BUN/Creatinine Ratio 10.8     Anion Gap 12.0 mmol/L     Narrative:      GFR Normal >60  Chronic Kidney Disease <60  Kidney Failure <15      POC Glucose Once [[id number]]  (Abnormal) Collected: 11/13/21 1713    Specimen: Blood Updated: 11/13/21 1823     Glucose 187 mg/dL      Comment: RN NotifiedOperator: [id number] DARIEN ESPINOIKAMeter ID: [id number]       POC Glucose Once [[id number]]  (Abnormal) Collected: 11/13/21 1808    Specimen: Blood Updated: 11/13/21 1823     Glucose 192 mg/dL      Comment: : [id number] RAY COURTNEYMeter ID: [id number]       Hemoglobin & Hematocrit, Blood [[id number]]  (Abnormal) Collected:  11/13/21 1733    Specimen: Blood Updated: 11/13/21 1743     Hemoglobin 8.0 g/dL      Hematocrit 24.3 %     POC Glucose Once [[id number]]  (Abnormal) Collected: 11/13/21 1624    Specimen: Blood Updated: 11/13/21 1637     Glucose 231 mg/dL      Comment: RN NotifiedOperator: [id number] PATRIC PILLAIEEMeter ID: [id number]       POC Glucose Once [[id number]]  (Abnormal) Collected: 11/11/21 1015    Specimen: Blood Updated: 11/13/21 1212     Glucose 413 mg/dL      Comment: RN NotifiedOperator: [id number] KEVIN TIFFANYMeter ID: [id number]       POC Glucose Once [[id number]]  (Abnormal) Collected: 11/11/21 1014    Specimen: Blood Updated: 11/13/21 1212     Glucose 402 mg/dL      Comment: RN NotifiedOperator: [id number] KEVIN TIFFANYMeter ID: [id number]       POC Glucose Once [[id number]]  (Abnormal) Collected: 11/12/21 1955    Specimen: Blood Updated: 11/13/21 1156     Glucose 319 mg/dL      Comment: RN NotifiedOperator: [id number] PATRICIA POLLOCKMeter ID: [id number]       POC Glucose Once [[id number]]  (Abnormal) Collected: 11/12/21 1009    Specimen: Blood Updated: 11/13/21 1156     Glucose 431 mg/dL      Comment: RN NotifiedOperator: [id number] REZABRYON CONTRERASNIFERMeter ID: [id number]               I reviewed the patient's new clinical results.    Assessment/Plan:     Active Hospital Problems    Diagnosis  POA   • **Chest pain [R07.9]  Yes     -S/P CABG x 3 (Primary), Bilateral carotid artery stenosis w/ 2 stents on left side,  PAD (peripheral artery disease) with stent in right upper leg, Morbid obesity   -Continue aspirin, Plavix, atorvastatin, lisinopril,  isosorbide mononitrate 60 mg daily     -EKG: No ST segment changes.   -Troponin negative  -Underwent heart catheterization, stents placed in left main circumflex and ostial circumflex artery. Patient also received thrombectomy of left main coronary artery and selective cannulation of left internal mammary artery of saphenous vein graft to right coronary artery           • Anemia [D64.9]  Yes     -Anemia of chronic disease vs GI bleed  -No active bleeding on recent EGD/Colonoscopy  -Recent capsule endoscopy which showed few small nonbleeding intestinal AVMs and single small intestinal polyp which GI planned to follow outpatient  -Hematoma during heart cath  -Continue to monitor H/H  -Will transfuse if Hgb <7  -Continue home ferrous sulfate  -Nephrology to start epogen with hemodialysis     • Type 2 diabetes mellitus with hyperglycemia (HCC) [E11.65]  Yes     Dr. Stephenson of Endocrinology consulted, initiated patient on insulin drip with plans to switch to basal, bolus insulin  Is considering initiating GLP 1 and/or sglt 2 prior to discharge          • Pneumonitis [J18.9]  Unknown   • Acute on chronic congestive heart failure (HCC) [I50.9]  Yes     - Continue Imdur, metoprolol, lisinopril      • CKD (chronic kidney disease), symptom management only, stage 5 (HCC) [N18.5]  Yes     Results from last 7 days   Lab Units 11/14/21  0503 11/14/21  0041 11/13/21  1841   CREATININE mg/dL 2.77* 2.61* 1.95*     -Completes outpatient HD on TTS  -Nephrology on board. Will require HD in house     • COPD (chronic obstructive pulmonary disease) (HCC) [J44.9]  Yes     -O2 supplementation  -Home inhalers as needed  -DuoNebs  -CPAP at night          DVT prophylaxis: Heparin  Code status is   Code Status and Medical Interventions:   Ordered at: 11/08/21 1734     Code Status (Patient has no pulse and is not breathing):    CPR (Attempt to Resuscitate)     Medical Interventions (Patient has pulse or is breathing):    Full Support       Plan for disposition:To place of residence in 1-2 days       Time: 15 min         This document has been electronically signed by Alvarado Mauricio MD on November 14, 2021 08:37 CST    Part of the note may be an electronic transcription or translation of spoken language to printed text using the Dragon Dictation System

## 2021-11-14 NOTE — PLAN OF CARE
Goal Outcome Evaluation:  Plan of Care Reviewed With: patient           Outcome Summary: OT re-start on this date after pt down to CCU for heart cath.  Pt UB strength 4+/5.  Limited re-start due to receiving blood.  Pt sup to sit to supine with CGA.  Pt able to comb hair with set up only.  Pt could benefit from continued OT services to regain lost function for ADLs and functional mob.

## 2021-11-14 NOTE — THERAPY RE-EVALUATION
Acute Care - Occupational Therapy Re-Evaluation  [facility]     Patient Name: Yamileth Slater  : 1960  MRN: [id number]  Today's Date: 2021        Referring Physician: SHADI Chaney MD.    Admit Date: 2021       ICD-10-CM ICD-9-CM   1. Chest pain, unspecified type  R07.9 786.50   2. Hyperglycemia  R73.9 790.29   3. Impaired mobility and ADLs  Z74.09 V49.89    Z78.9    4. Impaired functional mobility, balance, gait, and endurance  Z74.09 V49.89     Patient Active Problem List   Diagnosis   • Type 2 diabetes mellitus with diabetic polyneuropathy, with long-term current use of insulin ([facility])   • Chronic obstructive pulmonary disease ([facility])   • Chronic midline low back pain without sciatica   • Vitamin D deficiency   • Type 2 diabetes mellitus ([facility])   • Tobacco dependence syndrome   • Surgical follow-up care   • Shoulder joint pain   • Peripheral vascular disease ([facility])   • Pain   • Neurologic disorder associated with diabetes mellitus ([facility])   • Morbid obesity with BMI of 50.0-59.9, adult ([facility])   • Mixed hyperlipidemia   • Kidney stone   • History of colon polyps   • GERD (gastroesophageal reflux disease)   • Excoriated eczema   • Essential hypertension   • Encounter for medication refill   • Dyslipidemia   • Diabetes mellitus ([facility])   • Coronary artery disease   • COPD (chronic obstructive pulmonary disease) ([facility])   • Chronic folliculitis   • Chest pain   • Mild persistent asthma   • 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   • Uncontrolled type 2 diabetes mellitus with complication, with long-term current use of insulin ([facility])   • Hypothyroidism   • Carotid artery disease ([facility])   • Current smoker   • DM type 2 with diabetic peripheral neuropathy ([facility])   • [place] abuse   • PAD (peripheral artery disease) ([facility])   • S/P CABG x 3   • Intercostal pain   • Venous insufficiency   • Chronic respiratory failure with hypoxia ([facility])   • LAFB (left anterior fascicular block)   • Pulmonary  hypertension ([facility])   • Left hip pain   • Neck pain   • CKD (chronic kidney disease), symptom management only, stage 5 ([facility])   • MIKE and COPD overlap syndrome ([facility])   • Pneumonia due to COVID-19 virus   • Morbid obesity ([facility])   • Type 2 diabetes mellitus with diabetic nephropathy, with long-term current use of insulin ([facility])   • Hyponatremia   • Hyperkalemia   • Anemia   • Cutaneous candidiasis   • Community acquired pneumonia of right middle lobe of lung   • MRSA infection   • Alkaline phosphatase elevation   • COVID-19 ruled out by laboratory testing   • Esophageal dysphagia   • Monilial esophagitis ([facility])   • NSTEMI, initial episode of care ([facility])   • CAD (coronary artery disease)   • Chronic respiratory failure with hypoxia ([facility])   • Pneumonia due to infectious organism   • Chronic hypercapnic respiratory failure ([facility])   • Cellulitis of left leg   • Acute on chronic congestive heart failure ([facility])   • Hyponatremia   • Urinary tract infection due to Proteus   • History of insertion of tunneled central venous catheter (CVC) with port   • Anemia due to chronic kidney disease, on chronic dialysis ([facility])   • Anemia   • Type 2 diabetes mellitus with hyperglycemia ([facility])   • Pneumonitis     Past Medical History:   Diagnosis Date   • Acute blood loss anemia 4/16/2017    Likely due to gastric oozing at this time. - Dr. Duarte (GI) was consulted and has now signed off, will follow up outpatient - pill colonoscopy showed AVMs - continue to monitor   • Anxiety    • Carotid artery stenosis    • Chronic obstructive lung disease ([facility])    • CKD (chronic kidney disease) stage 4, GFR 15-29 ml/min ([facility])    • Colonic polyp    • Coronary arteriosclerosis    • Diabetes mellitus ([facility])    • Diabetic neuropathy ([facility])    • Ear pain, right 10/18/2021    - canal trauma due to patient scratching and DMT2 - added cortisporin ear drops   • Elevated troponin 10/12/2021    -most likely from CKD -Trending down -Neg chest pain   • GERD (gastroesophageal  reflux disease)    • GI bleed 5/13/2021    - GI will follow up outpatient - Protonix 40mg daily - Avoid medical DVT prophy and use mechanical at this time instead. - Continue to monitor - pill colonoscopy results showed AVMs   • History of transfusion    • Hypercholesterolemia    • Hypertension    • Hypomagnesemia 6/27/2021    Monitor and replace   • Morbid obesity ([facility])    • Nephrolithiasis    • Peripheral vascular disease ([facility])    • Sleep apnea    • Substance abuse ([facility])    • Vitamin D deficiency      Past Surgical History:   Procedure Laterality Date   • CARDIAC CATHETERIZATION N/A 7/14/2020   • CARDIAC CATHETERIZATION N/A 4/23/2021    Procedure: Left Heart Cath;  Surgeon: Melba Romo MD;  Location: [facility] CATH INVASIVE LOCATION;  Service: Cardiology;  Laterality: N/A;   • CARDIAC CATHETERIZATION N/A 4/30/2021    Procedure: Percutaneous Coronary Intervention;  Surgeon: Russell Voss MD;  Location: [facility] CATH INVASIVE LOCATION;  Service: Cardiovascular;  Laterality: N/A;   • CARDIAC CATHETERIZATION N/A 4/30/2021    Procedure: Stent NIKKI coronary;  Surgeon: Russell Voss MD;  Location: [facility] CATH INVASIVE LOCATION;  Service: Cardiovascular;  Laterality: N/A;   • CAROTID STENT Left    • COLONOSCOPY     • COLONOSCOPY N/A 5/14/2021    Procedure: COLONOSCOPY;  Surgeon: Mingo Duarte MD;  Location: [facility] ENDOSCOPY;  Service: Gastroenterology;  Laterality: N/A;   • CORONARY ARTERY BYPASS GRAFT N/A 2013    CABG X 3   • CYSTOSCOPY BLADDER STONE LITHOTRIPSY Bilateral    • ENDOSCOPY N/A 4/12/2021    Procedure: ESOPHAGOGASTRODUODENOSCOPY;  Surgeon: Mingo Duarte MD;  Location: [facility] ENDOSCOPY;  Service: Gastroenterology;  Laterality: N/A;   • ENDOSCOPY N/A 5/14/2021    Procedure: ESOPHAGOGASTRODUODENOSCOPY;  Surgeon: Mingo Duarte MD;  Location: [facility] ENDOSCOPY;  Service: Gastroenterology;  Laterality: N/A;   • INTERVENTIONAL RADIOLOGY PROCEDURE N/A 10/21/2021    Procedure:  tunneled central venous catheter placement;  Surgeon: Donnie Robles MD;  Location: [facility] ANGIO INVASIVE LOCATION;  Service: Interventional Radiology;  Laterality: N/A;         OT ASSESSMENT FLOWSHEET (last 12 hours)     OT Evaluation and Treatment     Row Name 11/14/21 1540                   OT Time and Intention    Subjective Information complains of; pain  -RB        Document Type re-evaluation; other (see comments)  Re-start after heart cath.  -RB        Mode of Treatment co-treatment; occupational therapy; physical therapy  -RB        Patient Effort adequate  -RB                  General Information    Patient Profile Reviewed yes  -RB        Referring Physician SHADI Chaney MD.  -RB        Existing Precautions/Restrictions fall; oxygen therapy device and L/min  -RB        Barriers to Rehab previous functional deficit  -RB                  Living Environment    Lives With facility resident  For rehab.  -RB                  Cognition    Orientation Status (Cognition) oriented x 4  -RB                  Pain Assessment    Additional Documentation Pain Scale: Numbers Pre/Post-Treatment (Group)  -RB                  Pain Scale: Numbers Pre/Post-Treatment    Pretreatment Pain Rating 6/10  -RB        Posttreatment Pain Rating 5/10  -RB        Pain Location head  -RB        Pain Intervention(s) Medication (See MAR)  -RB                  Range of Motion Comprehensive    General Range of Motion no range of motion deficits identified; bilateral upper extremity ROM WFL  -RB                  Strength Comprehensive (MMT)    General Manual Muscle Testing (MMT) Assessment other (see comments)  -RB        Comment, General Manual Muscle Testing (MMT) Assessment 4+5 grossly for B UE strength.  -RB                  Bed Mobility    Bed Mobility supine-sit; sit-supine  -RB        Supine-Sit Friend (Bed Mobility) contact guard  -RB        Sit-Supine Friend (Bed Mobility) contact guard  -RB        Assistive  Device (Bed Mobility) bed rails; head of bed elevated  -RB                  Functional Mobility    Functional Mobility- Ind. Level not tested  -RB        Functional Mobility- Comment Due to pt receiving blood.  -RB                  Transfer Assessment/Treatment    Transfers --  -RB        Comment (Transfers) Deferred due to receiving blood.  -RB                  Transfers    Sit-Stand [place] (Transfers) --  -RB                  Safety Issues, Functional Mobility    Impairments Affecting Function (Mobility) balance; endurance/activity tolerance; strength  -RB                  Activities of Daily Living    BADL Assessment/Intervention lower body dressing; grooming  -RB                  Lower Body Dressing Assessment/Training    [place] Level (Lower Body Dressing) don; socks; dependent (less than 25% patient effort)  -RB        Position (Lower Body Dressing) sitting up in bed  -RB                  Grooming Assessment/Training    [place] Level (Grooming) hair care, combing/brushing; set up  -RB        Position (Grooming) sitting up in bed  -RB                  Wound Right groin Incision    Wound - Properties Group Side: Right  -CR Location: groin  -CR Primary Wound Type: Incision  -CR        Retired Wound - Properties Group Side: Right  -CR Location: groin  -CR Primary Wound Type: Incision  -CR                  Vital Signs    Pre Systolic BP Rehab 124  -RB        Pre Treatment Diastolic BP 57  -RB        Post Systolic BP Rehab 131  -RB        Post Treatment Diastolic BP 62  -RB        Pretreatment Heart Rate (beats/min) 69  -RB        Posttreatment Heart Rate (beats/min) 78  -RB        Pre SpO2 (%) 96  -RB        O2 Delivery Pre Treatment supplemental O2  3 L  -RB        Post SpO2 (%) 97  -RB        O2 Delivery Post Treatment supplemental O2  -RB        Pre Patient Position Supine  -RB        Intra Patient Position Sitting  -RB        Post Patient Position Supine  -RB                  Transfer Goal 1  (OT)    Activity/Assistive Device (Transfer Goal 1, OT) toilet  -RB        [place] Level/Cues Needed (Transfer Goal 1, OT) modified independence  -RB        Time Frame (Transfer Goal 1, OT) long term goal (LTG); by discharge  -RB        Progress/Outcome (Transfer Goal 1, OT) goal not met  -RB                  Bathing Goal 1 (OT)    Activity/Device (Bathing Goal 1, OT) lower body bathing  -RB        [place] Level/Cues Needed (Bathing Goal 1, OT) set-up required; contact guard assist  -RB        Time Frame (Bathing Goal 1, OT) long term goal (LTG); by discharge  -RB        Progress/Outcomes (Bathing Goal 1, OT) goal not met  -RB                  Dressing Goal 1 (OT)    Activity/Device (Dressing Goal 1, OT) lower body dressing  -RB        [place]/Cues Needed (Dressing Goal 1, OT) set-up required; contact guard assist  -RB        Time Frame (Dressing Goal 1, OT) long term goal (LTG); by discharge  -RB        Progress/Outcome (Dressing Goal 1, OT) goal not met  -RB                  Positioning and Restraints    Pre-Treatment Position in bed  -RB        Post Treatment Position bed  -RB        In Bed supine; call light within reach; encouraged to call for assist; exit alarm on  -RB                  Therapy Assessment/Plan (OT)    Rehab Potential (OT) good, to achieve stated therapy goals  -RB        Criteria for Skilled Therapeutic Interventions Met (OT) yes; meets criteria; skilled treatment is necessary  -RB        Therapy Frequency (OT) other (see comments)  5-7 days per week  -RB                  Therapy Plan Review/Discharge Plan (OT)    Anticipated Discharge Disposition (OT) skilled nursing facility  for further rehab  -RB              User Key  (r) = Recorded By, (t) = Taken By, (c) = Cosigned By    Initials Name Effective Dates    RB Toney Mantilla, OT 06/16/21 -     Maira Felton, RN 06/16/21 -                  Occupational Therapy Education                 Title: PT OT SLP Therapies (In Progress)      Topic: Occupational Therapy (In Progress)     Point: ADL training (Not Started)     Description:   Instruct learner(s) on proper safety adaptation and remediation techniques during self care or transfers.   Instruct in proper use of assistive devices.              Learner Progress:  Not documented in this visit.          Point: Home exercise program (Not Started)     Description:   Instruct learner(s) on appropriate technique for monitoring, assisting and/or progressing therapeutic exercises/activities.              Learner Progress:  Not documented in this visit.          Point: Precautions (Done)     Description:   Instruct learner(s) on prescribed precautions during self-care and functional transfers.              Learning Progress Summary           Patient Acceptance, E, VU by RB at 11/14/2021 1630    Comment: Edu pt on reason for re-cert after heart cath.    Acceptance, E, VU by ME at 11/12/2021 0855    Comment: Educated on OT and POC. Educated to call for assistance. Educated on safety precautions.                   Point: Body mechanics (Done)     Description:   Instruct learner(s) on proper positioning and spine alignment during self-care, functional mobility activities and/or exercises.              Learning Progress Summary           Patient Acceptance, E, VU by ME at 11/12/2021 0855    Comment: Educated on OT and POC. Educated to call for assistance. Educated on safety precautions.                               User Key     Initials Effective Dates Name Provider Type Discipline     06/16/21 -  Toney Mantilla, SARA Occupational Therapist OT    ME 06/16/21 -  Nikole Espino OTR/L Occupational Therapist OT                  OT Recommendation and Plan  Therapy Frequency (OT): other (see comments) (5-7 days per week)  Plan of Care Review  Plan of Care Reviewed With: patient  Outcome Summary: OT re-start on this date after pt down to CCU for heart cath.  Pt UB strength 4+/5.  Limited re-start due to  receiving blood.  Pt sup to sit to supine with CGA.  Pt able to comb hair with set up only.  Pt could benefit from continued OT services to regain lost function for ADLs and functional mob.  Plan of Care Reviewed With: patient  Outcome Summary: OT re-start on this date after pt down to CCU for heart cath.  Pt UB strength 4+/5.  Limited re-start due to receiving blood.  Pt sup to sit to supine with CGA.  Pt able to comb hair with set up only.  Pt could benefit from continued OT services to regain lost function for ADLs and functional mob.     Outcome Measures     Row Name 11/14/21 1540             How much help from another is currently needed...    Putting on and taking off regular lower body clothing? 2  -RB      Bathing (including washing, rinsing, and drying) 3  -RB      Toileting (which includes using toilet bed pan or urinal) 3  -RB      Putting on and taking off regular upper body clothing 3  -RB      Taking care of personal grooming (such as brushing teeth) 4  -RB      Eating meals 4  -RB      AM-PAC 6 Clicks Score (OT) 19  -RB            User Key  (r) = Recorded By, (t) = Taken By, (c) = Cosigned By    Initials Name Provider Type    RB Toney Mantilla OT Occupational Therapist                Time Calculation:    Time Calculation- OT     Row Name 11/14/21 1635             Time Calculation- OT    OT Start Time 1540  -RB      OT Stop Time 1615  -RB      OT Time Calculation (min) 35 min  -RB      OT Received On 11/14/21  -RB      OT Goal Re-Cert Due Date 11/27/21  -RB            User Key  (r) = Recorded By, (t) = Taken By, (c) = Cosigned By    Initials Name Provider Type    RB Toney Mantilla OT Occupational Therapist              Therapy Charges for Today     Code Description Service Date Service Provider Modifiers Qty    [id number]  OT RE-EVAL 2 11/14/2021 Toney Mantilla OT GO 1               Toney Mantilla OT  11/14/2021

## 2021-11-14 NOTE — PLAN OF CARE
Problem: Adult Inpatient Plan of Care  Goal: Plan of Care Review  Outcome: Ongoing, Progressing  Flowsheets (Taken 11/14/2021 1445)  Progress: no change  Plan of Care Reviewed With: patient  Outcome Summary: Patient is tolerating her blood transfusion.  Goal: Patient-Specific Goal (Individualized)  Outcome: Ongoing, Progressing  Goal: Absence of Hospital-Acquired Illness or Injury  Outcome: Ongoing, Progressing  Intervention: Identify and Manage Fall Risk  Recent Flowsheet Documentation  Taken 11/14/2021 1445 by Jody Kumar, RN  Safety Promotion/Fall Prevention: safety round/check completed  Goal: Optimal Comfort and Wellbeing  Outcome: Ongoing, Progressing  Intervention: Provide Person-Centered Care  Recent Flowsheet Documentation  Taken 11/14/2021 1445 by Jody Kumar RN  Trust Relationship/Rapport: care explained  Goal: Readiness for Transition of Care  Outcome: Ongoing, Progressing   Goal Outcome Evaluation:  Plan of Care Reviewed With: patient        Progress: no change  Outcome Summary: Patient is tolerating her blood transfusion.

## 2021-11-14 NOTE — CONSULTS
CONSULT NOTE     Yamileth Slater is a 61 y.o. female who I am being consulted for  evaluation of  Type 2 Diabetes - Hyperglycemia       Consulting Provider  Dr. Coker    HPI    61 y o Female     T2DM more than 10 years    Complicated by CAD /CHF , ESRD , Retinopathy , Neuropathy     Poor Glycemic Control    Glucose readings inpatient 300 to 400 despite more than 1 unit per Kg of insulin and being NPO        Allergies   Allergen Reactions   • Adhesive Tape Hives   • Other      Bandaids, MRSA, SURECLOSE       Past Medical History:   Diagnosis Date   • Acute blood loss anemia 4/16/2017    Likely due to gastric oozing at this time. - Dr. Duarte (GI) was consulted and has now signed off, will follow up outpatient - pill colonoscopy showed AVMs - continue to monitor   • Anxiety    • Carotid artery stenosis    • Chronic obstructive lung disease (HCC)    • CKD (chronic kidney disease) stage 4, GFR 15-29 ml/min ([facility])    • Colonic polyp    • Coronary arteriosclerosis    • Diabetes mellitus (HCC)    • Diabetic neuropathy ([facility])    • Ear pain, right 10/18/2021    - canal trauma due to patient scratching and DMT2 - added cortisporin ear drops   • Elevated troponin 10/12/2021    -most likely from CKD -Trending down -Neg chest pain   • GERD (gastroesophageal reflux disease)    • GI bleed 5/13/2021    - GI will follow up outpatient - Protonix 40mg daily - Avoid medical DVT prophy and use mechanical at this time instead. - Continue to monitor - pill colonoscopy results showed AVMs   • History of transfusion    • Hypercholesterolemia    • Hypertension    • Hypomagnesemia 6/27/2021    Monitor and replace   • Morbid obesity ([facility])    • Nephrolithiasis    • Peripheral vascular disease ([facility])    • Sleep apnea    • Substance abuse ([facility])    • Vitamin D deficiency      Family History   Problem Relation Age of Onset   • Heart disease Mother    • Heart disease Father    • Heart disease Sister    • Heart disease Brother      Social History      Tobacco Use   • Smoking status: Former Smoker     Packs/day: 0.25     Years: 46.00     Pack years: 11.50     Types: Cigarettes   • Smokeless tobacco: Never Used   • Tobacco comment: only smoking 5 a day - quit april 23 2021   Substance Use Topics   • Alcohol use: No   • Drug use: Not Currently     Types: LSD, Marijuana, Methamphetamines         Current Facility-Administered Medications:   •  acetaminophen (TYLENOL) tablet 650 mg, 650 mg, Oral, Q8H PRN, Carline Coffey MD, 650 mg at 11/10/21 2214  •  albumin human 25 % IV SOLN 12.5 g, 12.5 g, Intravenous, PRN, Ace Lauren MD  •  albuterol (PROVENTIL) nebulizer solution 0.083% 2.5 mg/3mL, 2.5 mg, Nebulization, Q4H PRN, Carline Coffey MD  •  aspirin EC tablet 81 mg, 81 mg, Oral, Daily, Carline Coffey MD, 81 mg at 11/12/21 0830  •  atorvastatin (LIPITOR) tablet 20 mg, 20 mg, Oral, Daily, Carline Coffey MD, 20 mg at 11/12/21 0831  •  bumetanide (BUMEX) tablet 2 mg, 2 mg, Oral, Once per day on Sun Mon Wed Fri, Carline Coffey MD, 2 mg at 11/12/21 0830  •  cetirizine (zyrTEC) tablet 10 mg, 10 mg, Oral, Daily, Carline Coffey MD, 10 mg at 11/12/21 0830  •  clopidogrel (PLAVIX) tablet 75 mg, 75 mg, Oral, Daily, Carline Coffey MD, 75 mg at 11/12/21 0830  •  cyclobenzaprine (FLEXERIL) tablet 10 mg, 10 mg, Oral, BID PRN, Carline Coffey MD  •  dextrose (D50W) (25 g/50 mL) IV injection 25 g, 25 g, Intravenous, Q15 Min PRN, Carline Coffey MD  •  dextrose (D50W) (25 g/50 mL) IV injection 25-50 mL, 25-50 mL, Intravenous, Q30 Min PRN, Amor Rivera MD  •  dextrose (GLUTOSE) oral gel 15 g, 15 g, Oral, Q15 Min PRN, Carline Coffey MD  •  DULoxetine (CYMBALTA) DR capsule 20 mg, 20 mg, Oral, Daily, Carline Coffey MD, 20 mg at 11/12/21 0830  •  ferrous sulfate EC tablet 324 mg, 324 mg, Oral, Daily With Breakfast, Carline Coffey MD, 324 mg at 11/12/21 0831  •  fluticasone (FLONASE) 50 MCG/ACT nasal spray 2 spray, 2 spray, Each Nare, Daily,  Carline Coffey MD, 2 spray at 11/11/21 0802  •  gabapentin (NEURONTIN) capsule 400 mg, 400 mg, Oral, TID, Carline Coffey MD, 400 mg at 11/13/21 2048  •  glucagon (human recombinant) (GLUCAGEN DIAGNOSTIC) injection 1 mg, 1 mg, Subcutaneous, Q15 Min PRN, Carline Coffey MD  •  heparin (porcine) 5000 UNIT/ML injection 5,000 Units, 5,000 Units, Subcutaneous, Q12H, Carline Coffey MD, 5,000 Units at 11/13/21 2047  •  heparin (porcine) injection 2,000 Units, 2,000 Units, Intracatheter, PRN, Carline Coffey MD, 2,000 Units at 11/13/21 1757  •  heparin (porcine) injection 2,000 Units, 2,000 Units, Intracatheter, PRN, Carline Coffey MD, 2,000 Units at 11/13/21 1755  •  influenza vac split quad (FLUZONE,FLUARIX,AFLURIA,FLULAVAL) injection 0.5 mL, 0.5 mL, Intramuscular, During Hospitalization, Carline Coffey MD  •  insulin aspart (novoLOG) injection 8-40 Units, 8-40 Units, Subcutaneous, TID With Meals, Carline Coffey MD  •  insulin regular (HumuLIN R,NovoLIN R) 100 Units in sodium chloride 0.9 % 100 mL (1 Units/mL) infusion, 0-50 Units/hr, Intravenous, Titrated, Amor Rivera MD, Last Rate: 7 mL/hr at 11/13/21 2126, 7 Units/hr at 11/13/21 2126  •  ipratropium-albuterol (DUO-NEB) nebulizer solution 3 mL, 3 mL, Nebulization, 4x Daily - RT, Carline Coffey MD, 3 mL at 11/13/21 1927  •  isosorbide mononitrate (IMDUR) 24 hr tablet 30 mg, 30 mg, Oral, QAM, Carline Coffey MD, 30 mg at 11/12/21 0623  •  levothyroxine (SYNTHROID, LEVOTHROID) tablet 25 mcg, 25 mcg, Oral, Daily, Carline Coffey MD, 25 mcg at 11/12/21 0831  •  lidocaine (LIDODERM) 5 % 1 patch, 1 patch, Transdermal, Q24H, Carline Coffey MD, 1 patch at 11/12/21 0831  •  lisinopril (PRINIVIL,ZESTRIL) tablet 10 mg, 10 mg, Oral, Daily, Carline Coffey MD, 10 mg at 11/12/21 0830  •  melatonin tablet 1.5 mg, 1.5 mg, Oral, Nightly PRN, Carline Coffey MD  •  metoprolol tartrate (LOPRESSOR) tablet 50 mg, 50 mg, Oral, Q12H, Alexx  MD Carline, 50 mg at 11/13/21 2047  •  montelukast (SINGULAIR) tablet 10 mg, 10 mg, Oral, Nightly, Carline Coffey MD, 10 mg at 11/13/21 2047  •  morphine injection 1 mg, 1 mg, Intravenous, Q4H PRN, 1 mg at 11/12/21 2124 **AND** naloxone (NARCAN) injection 0.4 mg, 0.4 mg, Intravenous, Q5 Min PRN, Carline Coffey MD  •  nitroglycerin (NITROSTAT) SL tablet 0.4 mg, 0.4 mg, Sublingual, Q5 Min PRN, Carline Coffey MD  •  nystatin (MYCOSTATIN) powder, , Topical, TID, Carline Coffey MD, Given at 11/12/21 1557  •  ondansetron (ZOFRAN) injection 4 mg, 4 mg, Intravenous, Q6H PRN, Carline Coffey MD  •  ondansetron ODT (ZOFRAN-ODT) disintegrating tablet 4 mg, 4 mg, Oral, Q8H PRN, Carline Coffey MD  •  oxybutynin XL (DITROPAN-XL) 24 hr tablet 5 mg, 5 mg, Oral, Daily, Carline Coffey MD, 5 mg at 11/12/21 0830  •  pantoprazole (PROTONIX) EC tablet 40 mg, 40 mg, Oral, Nightly, Carline Coffey MD, 40 mg at 11/13/21 2047  •  promethazine (PHENERGAN) tablet 25 mg, 25 mg, Oral, Q6H PRN, Carline Coffey MD  •  ranolazine (RANEXA) 12 hr tablet 500 mg, 500 mg, Oral, Q12H, Carline Coffey MD, 500 mg at 11/13/21 2047  •  sennosides-docusate (PERICOLACE) 8.6-50 MG per tablet 2 tablet, 2 tablet, Oral, BID, Carline Coffey MD, 2 tablet at 11/13/21 2047  •  sodium chloride 0.9 % flush 10 mL, 10 mL, Intravenous, PRN, Carline Coffey MD  •  sodium chloride 0.9 % flush 10 mL, 10 mL, Intravenous, Q12H, Carline Coffey MD, 10 mL at 11/12/21 2127  •  sodium chloride 0.9 % flush 10 mL, 10 mL, Intravenous, PRN, Carline Coffey MD  •  sodium chloride 0.9 % flush 30 mL, 30 mL, Intravenous, Once PRN, Amor Rivera MD  •  sodium chloride 0.9 % infusion, 75 mL/hr, Intravenous, Continuous, Carline Coffey MD, Stopped at 11/13/21 1756  •  sodium chloride 0.9 % infusion, 30 mL/hr, Intravenous, Continuous PRN, Amor Rivera MD, Last Rate: 30 mL/hr at 11/13/21 1807, 30 mL/hr at 11/13/21 1807    No current  facility-administered medications on file prior to encounter.     Current Outpatient Medications on File Prior to Encounter   Medication Sig Dispense Refill   • albuterol (PROVENTIL) (2.5 MG/3ML) 0.083% nebulizer solution Inhale the contents of 1 vial by nebulization Every 4 (Four) Hours As Needed for Wheezing. 75 mL 3   • albuterol sulfate  (90 Base) MCG/ACT inhaler Inhale 2 puffs Every 4 (Four) Hours As Needed for Wheezing. 18 g 1   • aspirin (aspirin) 81 MG EC tablet Take 1 tablet by mouth Daily. 60 tablet 5   • atorvastatin (LIPITOR) 20 MG tablet TAKE ONE TABLET BY MOUTH EVERY NIGHT AT BEDTIME 30 tablet 1   • cetirizine (zyrTEC) 10 MG tablet Take 1 tablet by mouth Daily. 30 tablet 3   • clopidogrel (PLAVIX) 75 MG tablet Take 1 tablet by mouth Daily. 30 tablet 5   • cyclobenzaprine (FLEXERIL) 10 MG tablet Take 1 tablet by mouth 2 (Two) Times a Day As Needed for Muscle Spasms. 60 tablet 5   • DULoxetine (CYMBALTA) 20 MG capsule TAKE 1 CAPSULE BY MOUTH DAILY. 30 capsule 2   • ferrous sulfate (FeroSul) 325 (65 FE) MG tablet Take 1 tablet by mouth Daily With Breakfast. 30 tablet 3   • gabapentin (NEURONTIN) 400 MG capsule Take 1 capsule by mouth 3 (Three) Times a Day. 90 capsule 0   • insulin aspart (novoLOG FLEXPEN) 100 UNIT/ML solution pen-injector sc pen Inject 30 Units under the skin into the appropriate area as directed 3 (Three) Times a Day With Meals. 30 mL 12   • insulin degludec (Tresiba FlexTouch) 100 UNIT/ML solution pen-injector injection Inject 50 Units under the skin into the appropriate area as directed Every Night. 15 mL 10   • ipratropium-albuterol (DUO-NEB) 0.5-2.5 mg/3 ml nebulizer Take 3 mL by nebulization 4 (Four) Times a Day.     • levothyroxine (SYNTHROID, LEVOTHROID) 25 MCG tablet Take 25 mcg by mouth Daily.     • lidocaine (LIDODERM) 5 % APPLY TO THE AFFECTED AREA(S) TOPICALLY TWO TIMES A DAY. REMOVE NIGHTLY 30 patch 1   • lisinopril (PRINIVIL,ZESTRIL) 10 MG tablet Take 1 tablet by  mouth Daily. 30 tablet 5   • metoprolol tartrate (LOPRESSOR) 50 MG tablet TAKE ONE TABLET BY MOUTH TWO TIMES A DAY. HOLD IF PULSE IS LESS THAN 60 60 tablet 1   • montelukast (SINGULAIR) 10 MG tablet Take 1 tablet by mouth Every Night. 30 tablet 5   • nitroglycerin (NITROSTAT) 0.4 MG SL tablet Place 0.4 mg under the tongue Every 5 (Five) Minutes As Needed for Chest Pain (x 3 doses).     • nystatin (MYCOSTATIN) 017197 UNIT/GM powder Apply  topically to the appropriate area as directed 3 (Three) Times a Day. 15 g 1   • ondansetron ODT (Zofran ODT) 4 MG disintegrating tablet Place 1 tablet on the tongue Every 8 (Eight) Hours As Needed for Nausea or Vomiting. 30 tablet 0   • oxybutynin XL (DITROPAN-XL) 5 MG 24 hr tablet Take 1 tablet by mouth Daily. 90 tablet 1   • pantoprazole (PROTONIX) 40 MG EC tablet Take 1 tablet by mouth Every Night. 30 tablet 5   • sennosides-docusate (PERICOLACE) 8.6-50 MG per tablet Take 2 tablets by mouth 2 (Two) Times a Day. 60 tablet 2   • sodium bicarbonate 650 MG tablet Take 1 tablet by mouth 2 (Two) Times a Day. (Patient taking differently: Take 2 tablets by mouth 2 (Two) Times a Day.) 60 tablet 1   • acetaminophen (Tylenol 8 Hour) 650 MG 8 hr tablet Take 1 tablet by mouth Every 8 (Eight) Hours As Needed for Mild Pain . 90 tablet 0   • Blood Glucose Monitoring Suppl ([employer] Blood Glucose Meter) w/Device kit 1 each 3 (Three) Times a Day. 1 kit 3   • bumetanide (BUMEX) 2 MG tablet Take 1 tablet by mouth 4 (Four) Times a Week. 30 tablet 0   • Continuous Blood Gluc  (FreeStyle Jade 2 [place]) device 1 each Continuous. 1 each 0   • Continuous Blood Gluc Sensor (FreeStyle Jade 2 Sensor) misc 1 each Every 14 (Fourteen) Days. 2 each 11   • Dulaglutide (Trulicity) 0.75 MG/0.5ML solution pen-injector Inject 0.75 mg (1 pen) under the skin into the appropriate area as directed 1 (One) Time Per Week. 2 mL 6   • EASY TOUCH PEN NEEDLES 31G X 8 MM misc      • glucose blood test strip Use as  instructed 100 each 12   • Insulin Pen Needle (NovoFine) 30G X 8 MM misc As directed 4 times daily 100 each 11   • Insulin Pen Needle 31G X 8 MM misc Use to inject insulin 4 (Four) Times a Day as directed. 120 each 12   • isosorbide mononitrate (IMDUR) 60 MG 24 hr tablet Take 1 tablet by mouth Every Morning. (Patient taking differently: Take 0.5 tablets by mouth Every Morning. TAKING 30 MG ) 90 tablet 3   • promethazine (PHENERGAN) 25 MG tablet Take 25 mg by mouth Every 6 (Six) Hours As Needed.         Medications Discontinued During This Encounter   Medication Reason   • acetaminophen (TYLENOL) tablet 650 mg Duplicate order   • albuterol sulfate HFA (PROVENTIL HFA;VENTOLIN HFA;PROAIR HFA) inhaler 2 puff Duplicate order   • sodium bicarbonate tablet 650 mg    • bumetanide (BUMEX) tablet 2 mg    • insulin aspart (novoLOG) injection 30 Units    • insulin detemir (LEVEMIR) injection 50 Units    • insulin aspart (novoLOG) injection 34 Units    • insulin aspart (novoLOG) injection 0-7 Units    • insulin aspart (novoLOG) injection 3-15 Units    • insulin detemir (LEVEMIR) injection 60 Units    • insulin aspart (novoLOG) injection 6-30 Units    • insulin detemir (LEVEMIR) injection 35 Units    • iopamidol (ISOVUE-370) 76 % injection Patient Discharge   • hydrALAZINE (APRESOLINE) injection Patient Discharge   • Bivalirudin Trifluoroacetate (ANGIOMAX) 250 mg in sodium chloride 0.9 % 50 mL (5 mg/mL) infusion Patient Discharge   • ondansetron (ZOFRAN) injection Patient Discharge   • bivalirudin (ANGIOMAX) bolus from bag Patient Discharge   • lidocaine (XYLOCAINE) 2% injection Patient Discharge   • midazolam (VERSED) injection Patient Discharge   • fentaNYL citrate (PF) (SUBLIMAZE) injection Patient Discharge   • heparin 5000 units in sodium chloride 0.9% IV infusion Patient Discharge   • heparin 1000 units in sodium chloride 0.9% IV infusion Patient Discharge   • sodium chloride 0.9 % flush 3 mL Patient Transfer   • sodium  chloride 0.9 % flush 10 mL Patient Transfer   • insulin aspart (novoLOG) injection 3-15 Units    • insulin aspart (novoLOG) injection 3-15 Units    • insulin detemir (LEVEMIR) injection 60 Units    • heparin (porcine) injection 2,000 Units      Review of Systems   Constitutional: Positive for fatigue. Negative for activity change, appetite change, chills, diaphoresis, fever and unexpected weight change.   HENT: Negative for congestion, dental problem, drooling, ear discharge, ear pain, facial swelling, mouth sores, postnasal drip, rhinorrhea, sinus pressure, sore throat, tinnitus, trouble swallowing and voice change.    Eyes: Negative for photophobia, pain, discharge, redness, itching and visual disturbance.   Respiratory: Negative for apnea, cough, choking, chest tightness, shortness of breath, wheezing and stridor.    Cardiovascular: Negative for chest pain, palpitations and leg swelling.   Gastrointestinal: Negative for abdominal distention, abdominal pain, constipation, diarrhea, nausea and vomiting.   Endocrine: Negative for cold intolerance, heat intolerance, polydipsia, polyphagia and polyuria.   Genitourinary: Negative for decreased urine volume, difficulty urinating, dysuria, flank pain, frequency, hematuria and urgency.   Musculoskeletal: Positive for myalgias. Negative for arthralgias, back pain, gait problem, joint swelling, neck pain and neck stiffness.   Skin: Negative for color change, pallor, rash and wound.   Allergic/Immunologic: Negative for immunocompromised state.   Neurological: Negative for dizziness, tremors, seizures, syncope, facial asymmetry, speech difficulty, weakness, light-headedness, numbness and headaches.   Hematological: Negative for adenopathy.   Psychiatric/Behavioral: Negative for agitation, behavioral problems, confusion, decreased concentration, dysphoric mood, hallucinations, self-injury, sleep disturbance and suicidal ideas. The patient is not nervous/anxious and is not  "hyperactive.         Objective:   /63   Pulse 89   Temp 98 °F (36.7 °C) (Temporal)   Resp 17   Ht 157.5 cm (62\")   Wt 131 kg (288 lb 6.4 oz)   LMP  (LMP Unknown)   SpO2 99%   BMI 52.75 kg/m²     Physical Exam  Constitutional:       General: She is not in acute distress.     Appearance: Normal appearance. She is not ill-appearing, toxic-appearing or diaphoretic.      Comments: somnolent   HENT:      Head: Normocephalic and atraumatic.      Right Ear: External ear normal.      Left Ear: External ear normal.      Nose: Nose normal. No congestion or rhinorrhea.      Mouth/Throat:      Mouth: Mucous membranes are moist.   Eyes:      General: No scleral icterus.        Right eye: No discharge.         Left eye: No discharge.      Extraocular Movements: Extraocular movements intact.      Conjunctiva/sclera: Conjunctivae normal.   Neck:      Vascular: No carotid bruit.   Cardiovascular:      Rate and Rhythm: Normal rate and regular rhythm.      Pulses: Normal pulses.      Heart sounds: Normal heart sounds. No murmur heard.      Pulmonary:      Effort: Pulmonary effort is normal. No respiratory distress.      Breath sounds: Normal breath sounds. No stridor. No wheezing, rhonchi or rales.   Chest:      Chest wall: No tenderness.   Abdominal:      General: There is no distension.      Palpations: Abdomen is soft.   Musculoskeletal:         General: No swelling or tenderness. Normal range of motion.      Cervical back: Normal range of motion and neck supple. No rigidity. No muscular tenderness.   Lymphadenopathy:      Cervical: No cervical adenopathy.   Skin:     Coloration: Skin is not jaundiced or pale.      Findings: No bruising, erythema, lesion or rash.   Neurological:      General: No focal deficit present.      Mental Status: She is oriented to person, place, and time.      Cranial Nerves: No cranial nerve deficit.      Motor: No weakness.   Psychiatric:         Mood and Affect: Mood normal.         " Behavior: Behavior normal.         Thought Content: Thought content normal.         Judgment: Judgment normal.         Lab Review    Lab Results   Component Value Date    HGBA1C 8.80 (H) 10/26/2021       Lab Results   Component Value Date    GLUCOSE 214 (H) 11/13/2021    CALCIUM 9.0 11/13/2021     (L) 11/13/2021    K 4.0 11/13/2021    CO2 26.0 11/13/2021    CL 95 (L) 11/13/2021    BUN 21 11/13/2021    CREATININE 1.95 (H) 11/13/2021    EGFRIFAFRI  11/13/2021      Comment:      <15 Indicative of kidney failure.    EGFRIFNONA 26 (L) 11/13/2021    BCR 10.8 11/13/2021    ANIONGAP 12.0 11/13/2021     Lab Results   Component Value Date    GLUCOSE 214 (H) 11/13/2021    BUN 21 11/13/2021    CREATININE 1.95 (H) 11/13/2021    EGFRIFNONA 26 (L) 11/13/2021    EGFRIFAFRI  11/13/2021      Comment:      <15 Indicative of kidney failure.    BCR 10.8 11/13/2021    CO2 26.0 11/13/2021    CALCIUM 9.0 11/13/2021    ALBUMIN 3.10 (L) 11/13/2021    AST 10 11/13/2021    ALT 9 11/13/2021       Lab Results   Component Value Date    WBC 7.65 11/13/2021    HGB 8.0 (L) 11/13/2021    HCT 24.3 (L) 11/13/2021    MCV 81.9 11/13/2021     11/13/2021       Lab Results   Component Value Date    TSH 1.120 04/24/2021           Assessment/Plan     Type 2 Diabetes with hyperglycemia     I escalated insulin to 60 bid and novolog 8 units per carb and remained hyperglycemic    Taking advantage of ICU stay and importance of glycemic control aiming for glucose less  wilkerson 180, I instituted insulin Drip.     Based on overnight data, switch to basal , bolus insulin in am     She will benefit from GLP 1 even though limited data in ESRD    No data for sglt2 once ESRD has been reached so I won't use     ====    Addendum     From 10 pm to 6 am , she needed 20 units of insulin drip   So 60 of basal per day should be enough   I will rx 50 units of levemir this am, stop pump and do 4 units per 15 grams of carb consumed and sliding scale of 3 per 50  We will  keep diet to no more than 4 carbs per meal = 60 grams of carb per meal .

## 2021-11-15 ENCOUNTER — READMISSION MANAGEMENT (OUTPATIENT)
Dept: CALL CENTER | Facility: HOSPITAL | Age: 61
End: 2021-11-15

## 2021-11-15 ENCOUNTER — HOME HEALTH ADMISSION (OUTPATIENT)
Dept: HOME HEALTH SERVICES | Facility: HOME HEALTHCARE | Age: 61
End: 2021-11-15

## 2021-11-15 VITALS
TEMPERATURE: 97.1 F | HEART RATE: 64 BPM | HEIGHT: 62 IN | DIASTOLIC BLOOD PRESSURE: 65 MMHG | RESPIRATION RATE: 18 BRPM | BODY MASS INDEX: 53.92 KG/M2 | OXYGEN SATURATION: 98 % | WEIGHT: 293 LBS | SYSTOLIC BLOOD PRESSURE: 145 MMHG

## 2021-11-15 PROBLEM — I50.32 CHRONIC DIASTOLIC CONGESTIVE HEART FAILURE: Chronic | Status: ACTIVE | Noted: 2021-10-11

## 2021-11-15 LAB
ALBUMIN SERPL-MCNC: 3.3 G/DL (ref 3.5–5.2)
ALBUMIN/GLOB SERPL: 0.8 G/DL
ALP SERPL-CCNC: 121 U/L (ref 39–117)
ALT SERPL W P-5'-P-CCNC: 7 U/L (ref 1–33)
ANION GAP SERPL CALCULATED.3IONS-SCNC: 14 MMOL/L (ref 5–15)
AST SERPL-CCNC: 15 U/L (ref 1–32)
BASOPHILS # BLD AUTO: 0.07 10*3/MM3 (ref 0–0.2)
BASOPHILS NFR BLD AUTO: 0.8 % (ref 0–1.5)
BILIRUB SERPL-MCNC: 0.2 MG/DL (ref 0–1.2)
BUN SERPL-MCNC: 36 MG/DL (ref 8–23)
BUN/CREAT SERPL: 8.3 (ref 7–25)
CALCIUM SPEC-SCNC: 9 MG/DL (ref 8.6–10.5)
CHLORIDE SERPL-SCNC: 91 MMOL/L (ref 98–107)
CO2 SERPL-SCNC: 22 MMOL/L (ref 22–29)
CREAT SERPL-MCNC: 4.35 MG/DL (ref 0.57–1)
DEPRECATED RDW RBC AUTO: 44.6 FL (ref 37–54)
EOSINOPHIL # BLD AUTO: 0.34 10*3/MM3 (ref 0–0.4)
EOSINOPHIL NFR BLD AUTO: 3.7 % (ref 0.3–6.2)
ERYTHROCYTE [DISTWIDTH] IN BLOOD BY AUTOMATED COUNT: 14.8 % (ref 12.3–15.4)
FERRITIN SERPL-MCNC: 152.6 NG/ML (ref 13–150)
GFR SERPL CREATININE-BSD FRML MDRD: 10 ML/MIN/1.73
GFR SERPL CREATININE-BSD FRML MDRD: ABNORMAL ML/MIN/{1.73_M2}
GLOBULIN UR ELPH-MCNC: 4.4 GM/DL
GLUCOSE BLDC GLUCOMTR-MCNC: 173 MG/DL (ref 70–130)
GLUCOSE BLDC GLUCOMTR-MCNC: 277 MG/DL (ref 70–130)
GLUCOSE SERPL-MCNC: 212 MG/DL (ref 65–99)
HCT VFR BLD AUTO: 25.6 % (ref 34–46.6)
HCT VFR BLD AUTO: 25.9 % (ref 34–46.6)
HGB BLD-MCNC: 8.5 G/DL (ref 12–15.9)
HGB BLD-MCNC: 8.6 G/DL (ref 12–15.9)
IMM GRANULOCYTES # BLD AUTO: 0.14 10*3/MM3 (ref 0–0.05)
IMM GRANULOCYTES NFR BLD AUTO: 1.5 % (ref 0–0.5)
IRON 24H UR-MRATE: 36 MCG/DL (ref 37–145)
IRON SATN MFR SERPL: 14 % (ref 20–50)
LYMPHOCYTES # BLD AUTO: 1.84 10*3/MM3 (ref 0.7–3.1)
LYMPHOCYTES NFR BLD AUTO: 20.2 % (ref 19.6–45.3)
MCH RBC QN AUTO: 27.7 PG (ref 26.6–33)
MCHC RBC AUTO-ENTMCNC: 33.2 G/DL (ref 31.5–35.7)
MCV RBC AUTO: 83.5 FL (ref 79–97)
MONOCYTES # BLD AUTO: 0.71 10*3/MM3 (ref 0.1–0.9)
MONOCYTES NFR BLD AUTO: 7.8 % (ref 5–12)
NEUTROPHILS NFR BLD AUTO: 6.01 10*3/MM3 (ref 1.7–7)
NEUTROPHILS NFR BLD AUTO: 66 % (ref 42.7–76)
NRBC BLD AUTO-RTO: 0 /100 WBC (ref 0–0.2)
PLATELET # BLD AUTO: 241 10*3/MM3 (ref 140–450)
PMV BLD AUTO: 8.5 FL (ref 6–12)
POTASSIUM SERPL-SCNC: 4.6 MMOL/L (ref 3.5–5.2)
PROT SERPL-MCNC: 7.7 G/DL (ref 6–8.5)
QT INTERVAL: 432 MS
QTC INTERVAL: 545 MS
RBC # BLD AUTO: 3.1 10*6/MM3 (ref 3.77–5.28)
SARS-COV-2 N GENE RESP QL NAA+PROBE: NOT DETECTED
SODIUM SERPL-SCNC: 127 MMOL/L (ref 136–145)
TIBC SERPL-MCNC: 255 MCG/DL (ref 298–536)
TRANSFERRIN SERPL-MCNC: 171 MG/DL (ref 200–360)
WBC # BLD AUTO: 9.11 10*3/MM3 (ref 3.4–10.8)

## 2021-11-15 PROCEDURE — 85018 HEMOGLOBIN: CPT | Performed by: STUDENT IN AN ORGANIZED HEALTH CARE EDUCATION/TRAINING PROGRAM

## 2021-11-15 PROCEDURE — 85014 HEMATOCRIT: CPT | Performed by: STUDENT IN AN ORGANIZED HEALTH CARE EDUCATION/TRAINING PROGRAM

## 2021-11-15 PROCEDURE — 82728 ASSAY OF FERRITIN: CPT | Performed by: STUDENT IN AN ORGANIZED HEALTH CARE EDUCATION/TRAINING PROGRAM

## 2021-11-15 PROCEDURE — 97530 THERAPEUTIC ACTIVITIES: CPT

## 2021-11-15 PROCEDURE — 94799 UNLISTED PULMONARY SVC/PX: CPT

## 2021-11-15 PROCEDURE — 84466 ASSAY OF TRANSFERRIN: CPT | Performed by: STUDENT IN AN ORGANIZED HEALTH CARE EDUCATION/TRAINING PROGRAM

## 2021-11-15 PROCEDURE — G0008 ADMIN INFLUENZA VIRUS VAC: HCPCS | Performed by: STUDENT IN AN ORGANIZED HEALTH CARE EDUCATION/TRAINING PROGRAM

## 2021-11-15 PROCEDURE — 85025 COMPLETE CBC W/AUTO DIFF WBC: CPT | Performed by: STUDENT IN AN ORGANIZED HEALTH CARE EDUCATION/TRAINING PROGRAM

## 2021-11-15 PROCEDURE — 99233 SBSQ HOSP IP/OBS HIGH 50: CPT | Performed by: INTERNAL MEDICINE

## 2021-11-15 PROCEDURE — C9803 HOPD COVID-19 SPEC COLLECT: HCPCS | Performed by: STUDENT IN AN ORGANIZED HEALTH CARE EDUCATION/TRAINING PROGRAM

## 2021-11-15 PROCEDURE — 94664 DEMO&/EVAL PT USE INHALER: CPT

## 2021-11-15 PROCEDURE — 63710000001 INSULIN ASPART PER 5 UNITS: Performed by: STUDENT IN AN ORGANIZED HEALTH CARE EDUCATION/TRAINING PROGRAM

## 2021-11-15 PROCEDURE — 90686 IIV4 VACC NO PRSV 0.5 ML IM: CPT | Performed by: STUDENT IN AN ORGANIZED HEALTH CARE EDUCATION/TRAINING PROGRAM

## 2021-11-15 PROCEDURE — 25010000002 MORPHINE PER 10 MG: Performed by: STUDENT IN AN ORGANIZED HEALTH CARE EDUCATION/TRAINING PROGRAM

## 2021-11-15 PROCEDURE — 25010000002 HEPARIN (PORCINE) PER 1000 UNITS: Performed by: STUDENT IN AN ORGANIZED HEALTH CARE EDUCATION/TRAINING PROGRAM

## 2021-11-15 PROCEDURE — 80053 COMPREHEN METABOLIC PANEL: CPT | Performed by: STUDENT IN AN ORGANIZED HEALTH CARE EDUCATION/TRAINING PROGRAM

## 2021-11-15 PROCEDURE — 82962 GLUCOSE BLOOD TEST: CPT

## 2021-11-15 PROCEDURE — 25010000002 INFLUENZA VAC SPLIT QUAD 0.5 ML SUSPENSION PREFILLED SYRINGE: Performed by: STUDENT IN AN ORGANIZED HEALTH CARE EDUCATION/TRAINING PROGRAM

## 2021-11-15 PROCEDURE — 63710000001 INSULIN DETEMIR PER 5 UNITS: Performed by: STUDENT IN AN ORGANIZED HEALTH CARE EDUCATION/TRAINING PROGRAM

## 2021-11-15 PROCEDURE — 97535 SELF CARE MNGMENT TRAINING: CPT

## 2021-11-15 PROCEDURE — 87635 SARS-COV-2 COVID-19 AMP PRB: CPT | Performed by: STUDENT IN AN ORGANIZED HEALTH CARE EDUCATION/TRAINING PROGRAM

## 2021-11-15 PROCEDURE — 63710000001 INSULIN ASPART PER 5 UNITS: Performed by: INTERNAL MEDICINE

## 2021-11-15 PROCEDURE — 94760 N-INVAS EAR/PLS OXIMETRY 1: CPT

## 2021-11-15 PROCEDURE — 63710000001 INSULIN DETEMIR PER 5 UNITS: Performed by: INTERNAL MEDICINE

## 2021-11-15 PROCEDURE — 83540 ASSAY OF IRON: CPT | Performed by: STUDENT IN AN ORGANIZED HEALTH CARE EDUCATION/TRAINING PROGRAM

## 2021-11-15 PROCEDURE — 97164 PT RE-EVAL EST PLAN CARE: CPT

## 2021-11-15 RX ORDER — ISOSORBIDE MONONITRATE 30 MG/1
30 TABLET, EXTENDED RELEASE ORAL EVERY MORNING
Qty: 30 TABLET | Refills: 0 | Status: ON HOLD | OUTPATIENT
Start: 2021-11-15 | End: 2021-12-15

## 2021-11-15 RX ORDER — LISINOPRIL 5 MG/1
5 TABLET ORAL DAILY
Qty: 30 TABLET | Refills: 0 | Status: ON HOLD | OUTPATIENT
Start: 2021-11-16 | End: 2021-12-15

## 2021-11-15 RX ORDER — FLUTICASONE PROPIONATE 50 MCG
2 SPRAY, SUSPENSION (ML) NASAL DAILY
Qty: 9.9 ML | Refills: 0 | Status: SHIPPED | OUTPATIENT
Start: 2021-11-16 | End: 2022-01-26

## 2021-11-15 RX ORDER — RANOLAZINE 500 MG/1
500 TABLET, EXTENDED RELEASE ORAL EVERY 12 HOURS SCHEDULED
Qty: 30 TABLET | Refills: 0 | Status: ON HOLD | OUTPATIENT
Start: 2021-11-15 | End: 2021-12-15

## 2021-11-15 RX ADMIN — NYSTATIN: 100000 POWDER TOPICAL at 08:35

## 2021-11-15 RX ADMIN — INSULIN ASPART 12 UNITS: 100 INJECTION, SOLUTION INTRAVENOUS; SUBCUTANEOUS at 11:58

## 2021-11-15 RX ADMIN — HEPARIN SODIUM 5000 UNITS: 5000 INJECTION INTRAVENOUS; SUBCUTANEOUS at 08:36

## 2021-11-15 RX ADMIN — INSULIN DETEMIR 10 UNITS: 100 INJECTION, SOLUTION SUBCUTANEOUS at 11:00

## 2021-11-15 RX ADMIN — LISINOPRIL 5 MG: 5 TABLET ORAL at 08:36

## 2021-11-15 RX ADMIN — MORPHINE SULFATE 4 MG: 4 INJECTION, SOLUTION INTRAMUSCULAR; INTRAVENOUS at 12:11

## 2021-11-15 RX ADMIN — ASPIRIN 81 MG: 81 TABLET, FILM COATED ORAL at 08:36

## 2021-11-15 RX ADMIN — DULOXETINE HYDROCHLORIDE 20 MG: 20 CAPSULE, DELAYED RELEASE ORAL at 08:36

## 2021-11-15 RX ADMIN — INSULIN DETEMIR 50 UNITS: 100 INJECTION, SOLUTION SUBCUTANEOUS at 08:37

## 2021-11-15 RX ADMIN — INFLUENZA VIRUS VACCINE 0.5 ML: 15; 15; 15; 15 SUSPENSION INTRAMUSCULAR at 13:04

## 2021-11-15 RX ADMIN — ATORVASTATIN CALCIUM 20 MG: 20 TABLET, FILM COATED ORAL at 08:36

## 2021-11-15 RX ADMIN — OXYBUTYNIN CHLORIDE 5 MG: 5 TABLET, EXTENDED RELEASE ORAL at 08:36

## 2021-11-15 RX ADMIN — INSULIN ASPART 6 UNITS: 100 INJECTION, SOLUTION INTRAVENOUS; SUBCUTANEOUS at 11:59

## 2021-11-15 RX ADMIN — LIDOCAINE 1 PATCH: 700 PATCH TOPICAL at 08:35

## 2021-11-15 RX ADMIN — IPRATROPIUM BROMIDE AND ALBUTEROL SULFATE 3 ML: 2.5; .5 SOLUTION RESPIRATORY (INHALATION) at 11:06

## 2021-11-15 RX ADMIN — METOPROLOL TARTRATE 50 MG: 50 TABLET, FILM COATED ORAL at 08:36

## 2021-11-15 RX ADMIN — FERROUS SULFATE TAB EC 324 MG (65 MG FE EQUIVALENT) 324 MG: 324 (65 FE) TABLET DELAYED RESPONSE at 08:36

## 2021-11-15 RX ADMIN — LEVOTHYROXINE SODIUM 25 MCG: 25 TABLET ORAL at 08:36

## 2021-11-15 RX ADMIN — ISOSORBIDE MONONITRATE 30 MG: 30 TABLET, EXTENDED RELEASE ORAL at 05:53

## 2021-11-15 RX ADMIN — DOCUSATE SODIUM 50 MG AND SENNOSIDES 8.6 MG 2 TABLET: 8.6; 5 TABLET, FILM COATED ORAL at 08:36

## 2021-11-15 RX ADMIN — INSULIN ASPART 6 UNITS: 100 INJECTION, SOLUTION INTRAVENOUS; SUBCUTANEOUS at 08:37

## 2021-11-15 RX ADMIN — CETIRIZINE HYDROCHLORIDE 10 MG: 10 TABLET, FILM COATED ORAL at 08:36

## 2021-11-15 RX ADMIN — BUMETANIDE 2 MG: 1 TABLET ORAL at 08:35

## 2021-11-15 RX ADMIN — GABAPENTIN 400 MG: 400 CAPSULE ORAL at 08:36

## 2021-11-15 RX ADMIN — RANOLAZINE 500 MG: 500 TABLET, FILM COATED, EXTENDED RELEASE ORAL at 08:36

## 2021-11-15 RX ADMIN — CLOPIDOGREL BISULFATE 75 MG: 75 TABLET ORAL at 08:36

## 2021-11-15 RX ADMIN — INSULIN ASPART 10 UNITS: 100 INJECTION, SOLUTION INTRAVENOUS; SUBCUTANEOUS at 08:39

## 2021-11-15 RX ADMIN — IPRATROPIUM BROMIDE AND ALBUTEROL SULFATE 3 ML: 2.5; .5 SOLUTION RESPIRATORY (INHALATION) at 07:09

## 2021-11-15 NOTE — DISCHARGE PLACEMENT REQUEST
"Yamileth Slater (61 y.o. Female)             Date of Birth Social Security Number Address Home Phone MRN    1960  [address]  [facility] [place] [address] [phone] [id number]    [employer] Mar[employer] Status             None        Admission Date Admission Type Admitting Provider Attending Provider Department, Room/Bed    11/8/21 Emergency Alex Wells MD Romines, Kyle A, MD [address], 326/1    Discharge Date Discharge Disposition Discharge Destination           [facility] Care [facility]              Attending Provider: Alvarado Mauricio MD    Allergies: Adhesive Tape, Other    Isolation: Contact   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                 Active Insurance as of 11/8/2021     Primary Coverage     Payor Plan Insurance Group Employer/Plan Group    HUMANA MEDICARE REPLACEMENT HU[employer]NA MEDICARE REPLACEMENT [id number]     Payor Plan Address Payor Plan Phone Number Payor Plan Fax Number Effective Dates    [address] [address] [phone]  7/1/2020 - None Entered    [facility] [address]       Subscriber Name Subscriber Birth Date Member ID       YAMILETH SLATER 1960 [id number]           Secondary Coverage     Payor Plan Insurance Group Employer/Plan Group    [place]      Payor Plan Address Payor Plan Phone Number Payor Plan Fax Number Effective Dates    [address] [phone]  6/28/2019 - None Entered    [facility] [address]       Subscriber Name Subscriber Birth Date Member ID       YAMILETH SLATER 1960 [id number]                 Emergency Contacts      (Rel.) Home Phone Work Phone Mobile Phone    Huy Slater (Spouse) [phone] -- [phone]    Yamileth Chavez (Daughter) " [phone] -- [phone]    Suzanne Rivera (Daughter) [phone] -- [phone]            Insurance Information                HU[employer]NA MEDICARE REPLACEMENT/HU[employer]NA MEDICARE REPLACEMENT Phone: [phone]    Subscriber: Yamileth Slater Subscriber#: [id number]    Group#: [id number] Precert#: --        [place] Phone: [phone]    Subscriber: Yamileth Slater Subscriber#: [id number]    Group#: -- Precert#: --

## 2021-11-15 NOTE — THERAPY TREATMENT NOTE
Patient Name: Yamileth Slater  : 1960    MRN: [id number]                              Today's Date: 11/15/2021       Admit Date: 2021    Visit Dx:     ICD-10-CM ICD-9-CM   1. Chest pain, unspecified type  R07.9 786.50   2. Hyperglycemia  R73.9 790.29   3. Impaired mobility and ADLs  Z74.09 V49.89    Z78.9    4. Impaired functional mobility, balance, gait, and endurance  Z74.09 V49.89   5. Chronic hypercapnic respiratory failure ([facility])  J96.12 518.83   6. Morbid obesity ([facility])  E66.01 278.01   7. Chronic diastolic congestive heart failure ([facility])  I50.32 428.32     428.0     Patient Active Problem List   Diagnosis   • Type 2 diabetes mellitus with diabetic polyneuropathy, with long-term current use of insulin ([facility])   • Chronic obstructive pulmonary disease ([facility])   • Chronic midline low back pain without sciatica   • Vitamin D deficiency   • Type 2 diabetes mellitus ([facility])   • Tobacco dependence syndrome   • Surgical follow-up care   • Shoulder joint pain   • Peripheral vascular disease ([facility])   • Pain   • Neurologic disorder associated with diabetes mellitus ([facility])   • Morbid obesity with BMI of 50.0-59.9, adult ([facility])   • Mixed hyperlipidemia   • Kidney stone   • History of colon polyps   • GERD (gastroesophageal reflux disease)   • Excoriated eczema   • Essential hypertension   • Encounter for medication refill   • Dyslipidemia   • Diabetes mellitus ([facility])   • Coronary artery disease   • COPD (chronic obstructive pulmonary disease) ([facility])   • Chronic folliculitis   • Coronary artery disease   • Mild persistent asthma   • 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   • Uncontrolled type 2 diabetes mellitus with complication, with long-term current use of insulin ([facility])   • Hypothyroidism   • Carotid artery disease ([facility])   • Current smoker   • DM type 2 with diabetic peripheral neuropathy ([facility])   • [place] abuse   • PAD (peripheral artery disease) ([facility])   • S/P CABG x 3   • Intercostal pain   •  Venous insufficiency   • Chronic respiratory failure with hypoxia ([facility])   • LAFB (left anterior fascicular block)   • Pulmonary hypertension ([facility])   • Left hip pain   • Neck pain   • CKD (chronic kidney disease), symptom management only, stage 5 ([facility])   • MIKE and COPD overlap syndrome ([facility])   • Pneumonia due to COVID-19 virus   • Morbid obesity ([facility])   • Type 2 diabetes mellitus with diabetic nephropathy, with long-term current use of insulin ([facility])   • Hyponatremia   • Hyperkalemia   • Anemia   • Cutaneous candidiasis   • Community acquired pneumonia of right middle lobe of lung   • MRSA infection   • Alkaline phosphatase elevation   • COVID-19 ruled out by laboratory testing   • Esophageal dysphagia   • Monilial esophagitis ([facility])   • NSTEMI, initial episode of care ([facility])   • CAD (coronary artery disease)   • Chronic respiratory failure with hypoxia ([facility])   • Pneumonia due to infectious organism   • Chronic hypercapnic respiratory failure ([facility])   • Cellulitis of left leg   • Chronic diastolic congestive heart failure ([facility])   • Hyponatremia   • Urinary tract infection due to Proteus   • History of insertion of tunneled central venous catheter (CVC) with port   • Anemia due to chronic kidney disease, on chronic dialysis ([facility])   • Anemia   • Type 2 diabetes mellitus with hyperglycemia ([facility])   • Pneumonitis     Past Medical History:   Diagnosis Date   • Acute blood loss anemia 4/16/2017    Likely due to gastric oozing at this time. - Dr. Duarte (GI) was consulted and has now signed off, will follow up outpatient - pill colonoscopy showed AVMs - continue to monitor   • Anxiety    • Carotid artery stenosis    • Chronic obstructive lung disease ([facility])    • CKD (chronic kidney disease) stage 4, GFR 15-29 ml/min ([facility])    • Colonic polyp    • Coronary arteriosclerosis    • Diabetes mellitus ([facility])    • Diabetic neuropathy ([facility])    • Ear pain, right 10/18/2021    - canal trauma due to patient scratching and DMT2 - added  cortisporin ear drops   • Elevated troponin 10/12/2021    -most likely from CKD -Trending down -Neg chest pain   • GERD (gastroesophageal reflux disease)    • GI bleed 5/13/2021    - GI will follow up outpatient - Protonix 40mg daily - Avoid medical DVT prophy and use mechanical at this time instead. - Continue to monitor - pill colonoscopy results showed AVMs   • History of transfusion    • Hypercholesterolemia    • Hypertension    • Hypomagnesemia 6/27/2021    Monitor and replace   • Morbid obesity ([facility])    • Nephrolithiasis    • Peripheral vascular disease ([facility])    • Sleep apnea    • Substance abuse ([facility])    • Vitamin D deficiency      Past Surgical History:   Procedure Laterality Date   • CARDIAC CATHETERIZATION N/A 7/14/2020   • CARDIAC CATHETERIZATION N/A 4/23/2021    Procedure: Left Heart Cath;  Surgeon: Melba Romo MD;  Location: [facility] CATH INVASIVE LOCATION;  Service: Cardiology;  Laterality: N/A;   • CARDIAC CATHETERIZATION N/A 4/30/2021    Procedure: Percutaneous Coronary Intervention;  Surgeon: Russell Voss MD;  Location: [facility] CATH INVASIVE LOCATION;  Service: Cardiovascular;  Laterality: N/A;   • CARDIAC CATHETERIZATION N/A 4/30/2021    Procedure: Stent NIKKI coronary;  Surgeon: Russell Voss MD;  Location: [facility] CATH INVASIVE LOCATION;  Service: Cardiovascular;  Laterality: N/A;   • CARDIAC CATHETERIZATION Left 11/13/2021    Procedure: Left Heart Cath;  Surgeon: Niall Rios MD;  Location: [facility] CATH INVASIVE LOCATION;  Service: Cardiology;  Laterality: Left;   • CAROTID STENT Left    • COLONOSCOPY     • COLONOSCOPY N/A 5/14/2021    Procedure: COLONOSCOPY;  Surgeon: Mingo Duarte MD;  Location: [facility] ENDOSCOPY;  Service: Gastroenterology;  Laterality: N/A;   • CORONARY ARTERY BYPASS GRAFT N/A 2013    CABG X 3   • CYSTOSCOPY BLADDER STONE LITHOTRIPSY Bilateral    • ENDOSCOPY N/A 4/12/2021    Procedure: ESOPHAGOGASTRODUODENOSCOPY;  Surgeon: Mingo Duarte MD;   Location: [facility] ENDOSCOPY;  Service: Gastroenterology;  Laterality: N/A;   • ENDOSCOPY N/A 5/14/2021    Procedure: ESOPHAGOGASTRODUODENOSCOPY;  Surgeon: Mingo Duarte MD;  Location: [facility] ENDOSCOPY;  Service: Gastroenterology;  Laterality: N/A;   • INTERVENTIONAL RADIOLOGY PROCEDURE N/A 10/21/2021    Procedure: tunneled central venous catheter placement;  Surgeon: Donnie Robles MD;  Location: [facility] ANGIO INVASIVE LOCATION;  Service: Interventional Radiology;  Laterality: N/A;      General Information     Row Name 11/15/21 1340          OT Time and Intention    Document Type therapy note (daily note)  -LW     Mode of Treatment individual therapy; occupational therapy  -LW     Row Name 11/15/21 1340 11/15/21 0715       General Information    Patient Profile Reviewed yes  -LW yes  -LW    Existing Precautions/Restrictions fall; oxygen therapy device and L/min  -LW --    Row Name 11/15/21 1340          Cognition    Orientation Status (Cognition) oriented x 4  -LW     Row Name 11/15/21 1340          Safety Issues, Functional Mobility    Impairments Affecting Function (Mobility) balance; endurance/activity tolerance; strength  -LW           User Key  (r) = Recorded By, (t) = Taken By, (c) = Cosigned By    Initials Name Provider Type    LW Rekha Perez COTA Occupational Therapy Assistant                 Mobility/ADL's     Row Name 11/15/21 1340          Bed Mobility    Bed Mobility supine-sit  -LW     Sit-Supine [place] (Bed Mobility) supervision  -LW     Assistive Device (Bed Mobility) bed rails; draw sheet  -LW     Row Name 11/15/21 1340          Transfers    Transfers bed-chair transfer; sit-stand transfer; toilet transfer  -LW     Bed-Chair [place] (Transfers) supervision  -LW     Assistive Device (Bed-Chair Transfers) other (see comments)  HHA  -LW     Sit-Stand [place] (Transfers) supervision  -LW     [place] Level (Toilet Transfer) supervision  -LW     Assistive Device  (Toilet Transfer) commode; grab bars/safety frame; other (see comments)  A  -     Row Name 11/15/21 1340          Sit-Stand Transfer    Assistive Device (Sit-Stand Transfers) other (see comments)  A  -     Row Name 11/15/21 1340          Toilet Transfer    Type (Toilet Transfer) sit-stand; stand-sit  -     Row Name 11/15/21 1340          Functional Mobility    Functional Mobility- Ind. Level supervision required  -     Functional Mobility-Distance (Feet) 25  -     Row Name 11/15/21 1340          Activities of Daily Living    BADL Assessment/Intervention bathing; upper body dressing; lower body dressing; grooming; toileting  -     Row Name 11/15/21 1340          Lower Body Dressing Assessment/Training    [place] Level (Lower Body Dressing) doff; don; socks; dependent (less than 25% patient effort)  -LW     Position (Lower Body Dressing) supported sitting  -     Row Name 11/15/21 1340          Grooming Assessment/Training    [place] Level (Grooming) hair care, combing/brushing; oral care regimen; wash face, hands; standby assist  -LW     Position (Grooming) supported sitting  -[facility] Name 11/15/21 1340          Bathing Assessment/Intervention    [place] Level (Bathing) lower body; dependent (less than 25% patient effort); upper body; upper extremities; chest/trunk; supervision  -LW     Position (Bathing) supported sitting  -LW     Comment (Bathing) [place] bath  -     Row Name 11/15/21 1340          Upper Body Dressing Assessment/Training    [place] Level (Upper Body Dressing) doff; don; pull-over garment; supervision  -LW     Position (Upper Body Dressing) supported sitting  -     Row Name 11/15/21 1340          Toileting Assessment/Training    [place] Level (Toileting) adjust/manage clothing; perform perineal hygiene; dependent (less than 25% patient effort)  -     Assistive Devices (Toileting) commode; grab bar/safety frame  -LW     Position (Toileting) unsupported  sitting  -LW     Comment (Toileting) Pt t/f to toilet and toileting X3  -LW           User Key  (r) = Recorded By, (t) = Taken By, (c) = Cosigned By    Initials Name Provider Type    Rekha Trotter COTA Occupational Therapy Assistant               Obj/Interventions    No documentation.                Goals/Plan     Row Name 11/15/21 0715          Transfer Goal 1 (OT)    Activity/Assistive Device (Transfer Goal 1, OT) toilet  -LW     [place] Level/Cues Needed (Transfer Goal 1, OT) modified independence  -LW     Time Frame (Transfer Goal 1, OT) long term goal (LTG); by discharge  -LW     Progress/Outcome (Transfer Goal 1, OT) goal not met  -LW     Row Name 11/15/21 0715          Bathing Goal 1 (OT)    Activity/Device (Bathing Goal 1, OT) lower body bathing  -LW     [place] Level/Cues Needed (Bathing Goal 1, OT) set-up required; contact guard assist  -LW     Time Frame (Bathing Goal 1, OT) long term goal (LTG); by discharge  -LW     Progress/Outcomes (Bathing Goal 1, OT) goal met  -LW     Row Name 11/15/21 0715          Dressing Goal 1 (OT)    Activity/Device (Dressing Goal 1, OT) lower body dressing  -LW     [place]/Cues Needed (Dressing Goal 1, OT) set-up required; contact guard assist  -LW     Time Frame (Dressing Goal 1, OT) long term goal (LTG); by discharge  -LW     Progress/Outcome (Dressing Goal 1, OT) goal not met  -LW           User Key  (r) = Recorded By, (t) = Taken By, (c) = Cosigned By    Initials Name Provider Type    Rekha Trotter COTA Occupational Therapy Assistant               Clinical Impression     Row Name 11/15/21 0715          Pain Scale: Numbers Pre/Post-Treatment    Pretreatment Pain Rating 0/10 - no pain  -LW     Posttreatment Pain Rating 0/10 - no pain  -LW     Row Name 11/15/21 0715          Plan of Care Review    Progress improving  -LW     Outcome Summary Pt supine in bed upon entry. Pt had incontinent bladder episode. Pt sup to sit, sit to stand, and t/f to  toilet x3 with Supervision. Pt completed grooming with Supervision. Pt completed UB bathing with Supervision, LB bathing and dressing with Dependence. Pt t/f to bschai. All needs in reach.  -LW     Row Name 11/15/21 0715          Vital Signs    Pre Systolic BP Rehab 133  -LW     Pre Treatment Diastolic BP 62  -LW     Pretreatment Heart Rate (beats/min) 68  -LW     Pre SpO2 (%) 98  -LW     Row Name 11/15/21 0715          Positioning and Restraints    Pre-Treatment Position in bed  -LW     Post Treatment Position chair  -LW     In Chair reclined; call light within reach; encouraged to call for assist; exit alarm on  -LW           User Key  (r) = Recorded By, (t) = Taken By, (c) = Cosigned By    Initials Name Provider Type    LW Rekha Perez COTA Occupational Therapy Assistant               Outcome Measures     Row Name 11/15/21 0715          How much help from another is currently needed...    Putting on and taking off regular lower body clothing? 2  -LW     Bathing (including washing, rinsing, and drying) 3  -LW     Toileting (which includes using toilet bed pan or urinal) 3  -LW     Putting on and taking off regular upper body clothing 3  -LW     Taking care of personal grooming (such as brushing teeth) 4  -LW     Eating meals 4  -LW     AM-PAC 6 Clicks Score (OT) 19  -LW     Row Name 11/15/21 0955          How much help from another person do you currently need...    Turning from your back to your side while in flat bed without using bedrails? 3  -CZ     Moving from lying on back to sitting on the side of a flat bed without bedrails? 3  -CZ     Moving to and from a bed to a chair (including a wheelchair)? 3  -CZ     Standing up from a chair using your arms (e.g., wheelchair, bedside chair)? 3  -CZ     Climbing 3-5 steps with a railing? 3  -CZ     To walk in hospital room? 3  -CZ     AM-PAC 6 Clicks Score (PT) 18  -CZ     Row Name 11/15/21 0955          Functional Assessment    Outcome Measure Options AM-PAC 6  Clicks Basic Mobility (PT)  -           User Key  (r) = Recorded By, (t) = Taken By, (c) = Cosigned By    Initials Name Provider Type    Rekha Trotter COTA Occupational Therapy Assistant    Christopher Alvarenga, PT Physical Therapist                Occupational Therapy Education                 Title: PT OT SLP Therapies (In Progress)     Topic: Occupational Therapy (Done)     Point: ADL training (Done)     Description:   Instruct learner(s) on proper safety adaptation and remediation techniques during self care or transfers.   Instruct in proper use of assistive devices.              Learning Progress Summary           Patient Acceptance, E,TB, VU by LW at 11/15/2021 1354                   Point: Home exercise program (Done)     Description:   Instruct learner(s) on appropriate technique for monitoring, assisting and/or progressing therapeutic exercises/activities.              Learning Progress Summary           Patient Acceptance, E,TB, VU by LW at 11/15/2021 1354                   Point: Precautions (Done)     Description:   Instruct learner(s) on prescribed precautions during self-care and functional transfers.              Learning Progress Summary           Patient Acceptance, E,TB, VU by ISRAEL at 11/15/2021 1354    Acceptance, E, VU by  at 11/14/2021 1630    Comment: Edu pt on reason for re-cert after heart cath.    Acceptance, E, VU by ME at 11/12/2021 0855    Comment: Educated on OT and POC. Educated to call for assistance. Educated on safety precautions.                   Point: Body mechanics (Done)     Description:   Instruct learner(s) on proper positioning and spine alignment during self-care, functional mobility activities and/or exercises.              Learning Progress Summary           Patient Acceptance, E,TB, VU by ISRAEL at 11/15/2021 1354    Acceptance, E, VU by ME at 11/12/2021 0855    Comment: Educated on OT and POC. Educated to call for assistance. Educated on safety precautions.                                User Key     Initials Effective Dates Name Provider Type Discipline    RB 06/16/21 -  Toney Mantilla OT Occupational Therapist OT     06/16/21 -  Rekha Perez COTA Occupational Therapy Assistant OT    ME 06/16/21 -  Nikole Espino OTR/L Occupational Therapist OT              OT Recommendation and Plan     Plan of Care Review  Progress: improving  Outcome Summary: Pt supine in bed upon entry. Pt had incontinent bladder episode. Pt sup to sit, sit to stand, and t/f to toilet x3 with Supervision. Pt completed grooming with Supervision. Pt completed UB bathing with Supervision, LB bathing and dressing with Dependence. Pt t/f to bschai. All needs in reach.     Time Calculation:    Time Calculation- OT     Row Name 11/15/21 1354             Time Calculation- OT    OT Start Time 0715  -LW      OT Stop Time 0840  -LW      OT Time Calculation (min) 85 min  -LW      Total Timed Code Minutes- OT 85 minute(s)  -LW      OT Received On 11/15/21  -LW              Timed Charges    50289 - OT Self Care/Mgmt Minutes 85  -LW              Total Minutes    Timed Charges Total Minutes 85  -LW       Total Minutes 85  -LW            User Key  (r) = Recorded By, (t) = Taken By, (c) = Cosigned By    Initials Name Provider Type    LW Rekha Perez COTA Occupational Therapy Assistant              Therapy Charges for Today     Code Description Service Date Service Provider Modifiers Qty    [id number]  OT SELF CARE/MGMT/TRAIN EA 15 MIN 11/15/2021 Rekha Perez COTA GO 6               ISHAN Cosme  11/15/2021

## 2021-11-15 NOTE — PROGRESS NOTES
"[facility]  [address] [place] [address]. [address]  T - [phone]  F - [phone]     Progress Note          PATIENT  DEMOGRAPHICS   PATIENT NAME: Yamileth Slater                      PHYSICIAN: Ace Lauren MD  : 1960  MRN: [id number]   LOS: 3 days    Patient Care Team:  Rianna Macias MD as PCP - General (Family Medicine)  Subjective   SUBJECTIVE   Feels well   No soa         Objective   OBJECTIVE   Vital Signs  Temp:  [97 °F (36.1 °C)-98.1 °F (36.7 °C)] 97.2 °F (36.2 °C)  Heart Rate:  [59-78] 71  Resp:  [18-20] 20  BP: (115-181)/(53-77) 133/62    Flowsheet Rows      First Filed Value   Admission Height 157.5 cm (62\") Documented at 2021 1434   Admission Weight 128 kg (282 lb) Documented at 2021 1434           I/O last 3 completed shifts:  In: 2625.8 [P.O.:1410; I.V.:393; Blood:822.8]  Out: 1350 [Urine:1350]    PHYSICAL EXAM    Physical Exam  Vitals reviewed.   Constitutional:       Appearance: Normal appearance.   HENT:      Nose: Nose normal.   Cardiovascular:      Rate and Rhythm: Normal rate and regular rhythm.      Pulses: Normal pulses.      Heart sounds: Normal heart sounds. No murmur heard.      Pulmonary:      Effort: Pulmonary effort is normal.      Breath sounds: No wheezing or rales.   Abdominal:      General: Abdomen is flat. There is no distension.      Palpations: Abdomen is soft.      Tenderness: There is no abdominal tenderness.   Musculoskeletal:         General: No swelling.   Skin:     Coloration: Skin is not jaundiced.      Findings: No erythema.   Neurological:      General: No focal deficit present.      Mental Status: She is alert. She is disoriented.         RESULTS   Results Review:    Results from last 7 days   Lab Units 11/15/21  0533 21  0503 21  0041 21  1841 21  0536   SODIUM mmol/L 127* 135* 133*   < > 127*   POTASSIUM mmol/L 4.6 4.3 4.0   < > 4.5   CHLORIDE mmol/L 91* 96* 96*   < > 92*   CO2 mmol/L " 22.0 26.0 28.0   < > 22.0   BUN mg/dL 36* 26* 23   < > 52*   CREATININE mg/dL 4.35* 2.77* 2.61*   < > 4.06*   CALCIUM mg/dL 9.0 9.1 9.2   < > 9.1   BILIRUBIN mg/dL 0.2 0.2  --   --  0.2   ALK PHOS U/L 121* 114  --   --  160*   ALT (SGPT) U/L 7 8  --   --  9   AST (SGOT) U/L 15 17  --   --  10   GLUCOSE mg/dL 212* 136* 92   < > 363*    < > = values in this interval not displayed.       Estimated Creatinine Clearance: 17.8 mL/min (A) (by C-G formula based on SCr of 4.35 mg/dL (H)).                Results from last 7 days   Lab Units 11/15/21  0533 11/14/21  1011 11/14/21  0503 11/14/21  0041 11/13/21  1733 11/13/21  0536 11/13/21  0536 11/12/21  0531 11/12/21  0531 11/11/21  0600 11/11/21  0600   WBC 10*3/mm3 9.11  --  7.56  --   --   --  7.65  --  7.06  --  8.15   HEMOGLOBIN g/dL 8.6* 7.0* 7.3* 7.5* 8.0*   < > 7.7*   < > 8.4*   < > 8.2*   PLATELETS 10*3/mm3 241  --  312  --   --   --  273  --  265  --  267    < > = values in this interval not displayed.       Results from last 7 days   Lab Units 11/13/21  0536   INR  0.99         Imaging Results (Last 24 Hours)     ** No results found for the last 24 hours. **           MEDICATIONS    aspirin, 81 mg, Oral, Daily  atorvastatin, 20 mg, Oral, Daily  bumetanide, 2 mg, Oral, Once per day on Sun Mon Wed Fri  cetirizine, 10 mg, Oral, Daily  clopidogrel, 75 mg, Oral, Daily  DULoxetine, 20 mg, Oral, Daily  [START ON 11/16/2021] epoetin hubert/hubert-epbx, 10,000 Units, Intravenous, Once per day on Tue Thu Sat  ferrous sulfate, 324 mg, Oral, Daily With Breakfast  fluticasone, 2 spray, Each Nare, Daily  gabapentin, 400 mg, Oral, TID  heparin (porcine), 5,000 Units, Subcutaneous, Q12H  insulin aspart, 3-12 Units, Subcutaneous, 4x Daily AC & at Bedtime  insulin aspart, 3-12 Units, Subcutaneous, Q24H  insulin aspart, 6-24 Units, Subcutaneous, TID With Meals  insulin detemir, 10 Units, Subcutaneous, Once  [START ON 11/16/2021] insulin detemir, 60 Units, Subcutaneous,  Daily  ipratropium-albuterol, 3 mL, Nebulization, 4x Daily - RT  isosorbide mononitrate, 30 mg, Oral, QAM  levothyroxine, 25 mcg, Oral, Daily  lidocaine, 1 patch, Transdermal, Q24H  lisinopril, 5 mg, Oral, Daily  metoprolol tartrate, 50 mg, Oral, Q12H  montelukast, 10 mg, Oral, Nightly  nystatin, , Topical, TID  oxybutynin XL, 5 mg, Oral, Daily  pantoprazole, 40 mg, Oral, Nightly  ranolazine, 500 mg, Oral, Q12H  sennosides-docusate, 2 tablet, Oral, BID  sodium chloride, 10 mL, Intravenous, Q12H      sodium chloride, 75 mL/hr, Last Rate: 75 mL/hr (11/14/21 1454)        Assessment/Plan   ASSESSMENT / PLAN      Coronary artery disease    COPD (chronic obstructive pulmonary disease) (HCC)    CKD (chronic kidney disease), symptom management only, stage 5 ([facility])    Chronic diastolic congestive heart failure (HCC)    Anemia    Type 2 diabetes mellitus with hyperglycemia ([facility])    Pneumonitis    1.  ESRD on HD- Initiated HD on 10/21 due to hyperkalemia and fluid overload, now likely ESRD.  HD TTS now. hd next on tomorrow.     Ok to discharge and f/u at next dialysis session     2.  Hyponatremia- Na <130. Modulate with HD     3.  Chest pain- tress test shows small-sized, mildly severe area of ischemia located in the basal inferior lateral wall. Pronto thrombectomy of the left main coronary artery.  PTCA and stenting of the distal left main and ostial circumflex artery with drug-eluting stent.     4.  History of CAD     5.  History of COPD - On trilogy at night but left it at home     6.  Anemia / recent GI bleed / b12 deficiency-  s/p capsule endoscopy which showed few small non-bleeding intestinal AVMs and single small polyp. Keep b12. Keep folate. will give iv fe with hd in out patient setting     7. 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 HTN- Continue home antihypertensives. Low dose lisinopril.                This document has been electronically signed by Ace Lauren MD on November 15, 2021 10:52 CST

## 2021-11-15 NOTE — OUTREACH NOTE
Prep Survey      Responses   [facility] facility patient discharged from? [place]   Is LACE score < 7 ? No   Emergency Room discharge w/ pulse ox? No   Eligibility TCM   [facility]   Date of Admission 11/08/21   Date of Discharge 11/15/21   Discharge Disposition Home or Self Care   Discharge diagnosis CABG -heart cath this visit   Does the patient have one of the following disease processes/diagnoses(primary or secondary)? Other   Does the patient have Home health ordered? Yes   What is the Home health agency?  [facility]    Is there a DME ordered? No   Prep survey completed? Yes          Marybel Hsu RN

## 2021-11-15 NOTE — THERAPY RE-EVALUATION
Patient Name: Yamileth Slater  : 1960    MRN: [id number]                              Today's Date: 11/15/2021       Admit Date: 2021    Visit Dx:     ICD-10-CM ICD-9-CM   1. Chest pain, unspecified type  R07.9 786.50   2. Hyperglycemia  R73.9 790.29   3. Impaired mobility and ADLs  Z74.09 V49.89    Z78.9    4. Impaired functional mobility, balance, gait, and endurance  Z74.09 V49.89   5. Chronic hypercapnic respiratory failure ([facility])  J96.12 518.83   6. Morbid obesity ([facility])  E66.01 278.01   7. Chronic diastolic congestive heart failure ([facility])  I50.32 428.32     428.0     Patient Active Problem List   Diagnosis   • Type 2 diabetes mellitus with diabetic polyneuropathy, with long-term current use of insulin ([facility])   • Chronic obstructive pulmonary disease ([facility])   • Chronic midline low back pain without sciatica   • Vitamin D deficiency   • Type 2 diabetes mellitus ([facility])   • Tobacco dependence syndrome   • Surgical follow-up care   • Shoulder joint pain   • Peripheral vascular disease ([facility])   • Pain   • Neurologic disorder associated with diabetes mellitus ([facility])   • Morbid obesity with BMI of 50.0-59.9, adult ([facility])   • Mixed hyperlipidemia   • Kidney stone   • History of colon polyps   • GERD (gastroesophageal reflux disease)   • Excoriated eczema   • Essential hypertension   • Encounter for medication refill   • Dyslipidemia   • Diabetes mellitus ([facility])   • Coronary artery disease   • COPD (chronic obstructive pulmonary disease) ([facility])   • Chronic folliculitis   • Coronary artery disease   • Mild persistent asthma   • 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   • Uncontrolled type 2 diabetes mellitus with complication, with long-term current use of insulin ([facility])   • Hypothyroidism   • Carotid artery disease ([facility])   • Current smoker   • DM type 2 with diabetic peripheral neuropathy ([facility])   • [place] abuse   • PAD (peripheral artery disease) ([facility])   • S/P CABG x 3   • Intercostal pain   •  Venous insufficiency   • Chronic respiratory failure with hypoxia ([facility])   • LAFB (left anterior fascicular block)   • Pulmonary hypertension ([facility])   • Left hip pain   • Neck pain   • CKD (chronic kidney disease), symptom management only, stage 5 ([facility])   • MIKE and COPD overlap syndrome ([facility])   • Pneumonia due to COVID-19 virus   • Morbid obesity ([facility])   • Type 2 diabetes mellitus with diabetic nephropathy, with long-term current use of insulin ([facility])   • Hyponatremia   • Hyperkalemia   • Anemia   • Cutaneous candidiasis   • Community acquired pneumonia of right middle lobe of lung   • MRSA infection   • Alkaline phosphatase elevation   • COVID-19 ruled out by laboratory testing   • Esophageal dysphagia   • Monilial esophagitis ([facility])   • NSTEMI, initial episode of care ([facility])   • CAD (coronary artery disease)   • Chronic respiratory failure with hypoxia ([facility])   • Pneumonia due to infectious organism   • Chronic hypercapnic respiratory failure ([facility])   • Cellulitis of left leg   • Chronic diastolic congestive heart failure ([facility])   • Hyponatremia   • Urinary tract infection due to Proteus   • History of insertion of tunneled central venous catheter (CVC) with port   • Anemia due to chronic kidney disease, on chronic dialysis ([facility])   • Anemia   • Type 2 diabetes mellitus with hyperglycemia ([facility])   • Pneumonitis     Past Medical History:   Diagnosis Date   • Acute blood loss anemia 4/16/2017    Likely due to gastric oozing at this time. - Dr. Duarte (GI) was consulted and has now signed off, will follow up outpatient - pill colonoscopy showed AVMs - continue to monitor   • Anxiety    • Carotid artery stenosis    • Chronic obstructive lung disease ([facility])    • CKD (chronic kidney disease) stage 4, GFR 15-29 ml/min ([facility])    • Colonic polyp    • Coronary arteriosclerosis    • Diabetes mellitus ([facility])    • Diabetic neuropathy ([facility])    • Ear pain, right 10/18/2021    - canal trauma due to patient scratching and DMT2 - added  cortisporin ear drops   • Elevated troponin 10/12/2021    -most likely from CKD -Trending down -Neg chest pain   • GERD (gastroesophageal reflux disease)    • GI bleed 5/13/2021    - GI will follow up outpatient - Protonix 40mg daily - Avoid medical DVT prophy and use mechanical at this time instead. - Continue to monitor - pill colonoscopy results showed AVMs   • History of transfusion    • Hypercholesterolemia    • Hypertension    • Hypomagnesemia 6/27/2021    Monitor and replace   • Morbid obesity ([facility])    • Nephrolithiasis    • Peripheral vascular disease ([facility])    • Sleep apnea    • Substance abuse ([facility])    • Vitamin D deficiency      Past Surgical History:   Procedure Laterality Date   • CARDIAC CATHETERIZATION N/A 7/14/2020   • CARDIAC CATHETERIZATION N/A 4/23/2021    Procedure: Left Heart Cath;  Surgeon: Melba Romo MD;  Location: [facility] CATH INVASIVE LOCATION;  Service: Cardiology;  Laterality: N/A;   • CARDIAC CATHETERIZATION N/A 4/30/2021    Procedure: Percutaneous Coronary Intervention;  Surgeon: Russell Voss MD;  Location: [facility] CATH INVASIVE LOCATION;  Service: Cardiovascular;  Laterality: N/A;   • CARDIAC CATHETERIZATION N/A 4/30/2021    Procedure: Stent NIKKI coronary;  Surgeon: Russell Voss MD;  Location: [facility] INVASIVE LOCATION;  Service: Cardiovascular;  Laterality: N/A;   • CAROTID STENT Left    • COLONOSCOPY     • COLONOSCOPY N/A 5/14/2021    Procedure: COLONOSCOPY;  Surgeon: Mingo Duarte MD;  Location: [facility] ENDOSCOPY;  Service: Gastroenterology;  Laterality: N/A;   • CORONARY ARTERY BYPASS GRAFT N/A 2013    CABG X 3   • CYSTOSCOPY BLADDER STONE LITHOTRIPSY Bilateral    • ENDOSCOPY N/A 4/12/2021    Procedure: ESOPHAGOGASTRODUODENOSCOPY;  Surgeon: Mingo Duarte MD;  Location: [facility] ENDOSCOPY;  Service: Gastroenterology;  Laterality: N/A;   • ENDOSCOPY N/A 5/14/2021    Procedure: ESOPHAGOGASTRODUODENOSCOPY;  Surgeon: Mingo Duarte MD;  Location:  [facility] ENDOSCOPY;  Service: Gastroenterology;  Laterality: N/A;   • INTERVENTIONAL RADIOLOGY PROCEDURE N/A 10/21/2021    Procedure: tunneled central venous catheter placement;  Surgeon: Donnie Robles MD;  Location: [facility] ANGIO INVASIVE LOCATION;  Service: Interventional Radiology;  Laterality: N/A;      General Information     Row Name 11/15/21 0955          Physical Therapy Time and Intention    Document Type re-evaluation  -CZ     Mode of Treatment individual therapy; physical therapy  -CZ     Row Name 11/15/21 0955          General Information    Patient Profile Reviewed yes  -CZ     Prior Level of Function independent:; all household mobility  -CZ     Existing Precautions/Restrictions fall; oxygen therapy device and L/min  -CZ     Barriers to Rehab previous functional deficit  -CZ     Row Name 11/15/21 0955          Living Environment    Lives With facility resident  -CZ     Row Name 11/15/21 0955          Stairs Within Home, Primary    Stairs, Within Home, Primary Now planning to return home upon discharge.  -CZ     Number of Stairs, Within Home, Primary none  -CZ     Row Name 11/15/21 0955          Cognition    Orientation Status (Cognition) oriented x 4  -CZ     Row Name 11/15/21 0955          Safety Issues, Functional Mobility    Impairments Affecting Function (Mobility) balance; endurance/activity tolerance; strength  -CZ           User Key  (r) = Recorded By, (t) = Taken By, (c) = Cosigned By    Initials Name Provider Type    CZ Christopher Arnett, PT Physical Therapist               Mobility     Row Name 11/15/21 0955          Bed Mobility    Bed Mobility supine-sit  -CZ     Row Name 11/15/21 0955          Sit-Stand Transfer    Sit-Stand [place] (Transfers) supervision  -     Row Name 11/15/21 0955          Gait/Stairs (Locomotion)    [place] Level (Gait) supervision  -     Distance in Feet (Gait) 15'x2.  -CZ     Comment (Gait/Stairs) Wide stance, good use of walker, no LOB.  -CZ            User Key  (r) = Recorded By, (t) = Taken By, (c) = Cosigned By    Initials Name Provider Type    CZ Christopher Arnett, PT Physical Therapist               Obj/Interventions     Row Name 11/15/21 0955          Range of Motion Comprehensive    General Range of Motion bilateral lower extremity ROM WFL  -CZ     Row Name 11/15/21 0955          Strength Comprehensive (MMT)    General Manual Muscle Testing (MMT) Assessment other (see comments)  -CZ     Comment, General Manual Muscle Testing (MMT) Assessment BLEs: 4/5.  R hip not tested secondary to recent heart cath procedure.  -CZ           User Key  (r) = Recorded By, (t) = Taken By, (c) = Cosigned By    Initials Name Provider Type    CZ Christopher Arnett, PT Physical Therapist               Goals/Plan     Row Name 11/15/21 0955          Bed Mobility Goal 1 (PT)    Activity/Assistive Device (Bed Mobility Goal 1, PT) sit to supine/supine to sit  -CZ     [place] Level/Cues Needed (Bed Mobility Goal 1, PT) independent  -CZ     Time Frame (Bed Mobility Goal 1, PT) by discharge  -CZ     Strategies/Barriers (Bed Mobility Goal 1, PT) HOB flat, no bed rails.  -CZ     Progress/Outcomes (Bed Mobility Goal 1, PT) goal not met  -CZ     Row Name 11/15/21 0955          Transfer Goal 1 (PT)    Activity/Assistive Device (Transfer Goal 1, PT) sit-to-stand/stand-to-sit; bed-to-chair/chair-to-bed; walker, rolling  -CZ     [place] Level/Cues Needed (Transfer Goal 1, PT) modified independence  -CZ     Time Frame (Transfer Goal 1, PT) by discharge  -CZ     Progress/Outcome (Transfer Goal 1, PT) goal not met  -CZ     Row Name 11/15/21 0955          Gait Training Goal 1 (PT)    Activity/Assistive Device (Gait Training Goal 1, PT) walker, rolling  -CZ     [place] Level (Gait Training Goal 1, PT) modified independence  -CZ     Distance (Gait Training Goal 1, PT) 150ft or more  -CZ     Time Frame (Gait Training Goal 1, PT) by discharge  -CZ     Progress/Outcome (Gait Training  Goal 1, PT) goal not met  -CZ           User Key  (r) = Recorded By, (t) = Taken By, (c) = Cosigned By    Initials Name Provider Type    CZ Christopher Arnett, PT Physical Therapist               Clinical Impression     Row Name 11/15/21 0955          Pain    Additional Documentation Pain Scale: Numbers Pre/Post-Treatment (Group)  -CZ     Row Name 11/15/21 0955          Pain Scale: Numbers Pre/Post-Treatment    Pretreatment Pain Rating 0/10 - no pain  -CZ     Posttreatment Pain Rating 0/10 - no pain  -CZ     Row Name 11/15/21 0955          Plan of Care Review    Plan of Care Reviewed With patient  -CZ     Outcome Summary PT re-evaluation complete.  Patient alert and cooperative.  She requires SPV with transfers and gait, ambulating 15'x2 with FWW, wide stance, no LOB, good use of walker.  Patient likely to discharge home today, will need HHPT; family is available to assist patient.  Goals reviewed/updated, continue skilled I/P PT.  -     Row Name 11/15/21 0955          Therapy Assessment/Plan (PT)    Rehab Potential (PT) good, to achieve stated therapy goals  -CZ     Criteria for Skilled Interventions Met (PT) yes; skilled treatment is necessary  -CZ     Predicted Duration of Therapy Intervention (PT) Likely discharge today.  -     Row Name 11/15/21 0955          Vital Signs    Pre Systolic BP Rehab 134  -CZ     Pre Treatment Diastolic BP 53  -CZ     Post Systolic BP Rehab 138  -CZ     Post Treatment Diastolic BP 51  -CZ     Pretreatment Heart Rate (beats/min) 66  -CZ     Posttreatment Heart Rate (beats/min) 69  -CZ     Pre SpO2 (%) 98  -CZ     O2 Delivery Pre Treatment nasal cannula  -CZ     Post SpO2 (%) 99  -CZ     O2 Delivery Post Treatment nasal cannula  -CZ     Pre Patient Position Sitting  -CZ     Post Patient Position Sitting  -CZ     Row Name 11/15/21 0955          Positioning and Restraints    Pre-Treatment Position sitting in chair/recliner  -CZ     Post Treatment Position chair  -CZ     In Chair  sitting; call light within reach; encouraged to call for assist; with nsg  -CZ           User Key  (r) = Recorded By, (t) = Taken By, (c) = Cosigned By    Initials Name Provider Type    Christopher Alvarenga, PT Physical Therapist               Outcome Measures     Row Name 11/15/21 0955          How much help from another person do you currently need...    Turning from your back to your side while in flat bed without using bedrails? 3  -CZ     Moving from lying on back to sitting on the side of a flat bed without bedrails? 3  -CZ     Moving to and from a bed to a chair (including a wheelchair)? 3  -CZ     Standing up from a chair using your arms (e.g., wheelchair, bedside chair)? 3  -CZ     Climbing 3-5 steps with a railing? 3  -CZ     To walk in hospital room? 3  -CZ     AM-PAC 6 Clicks Score (PT) 18  -CZ     Row Name 11/15/21 0955          Functional Assessment    Outcome Measure Options AM-PAC 6 Clicks Basic Mobility (PT)  -CZ           User Key  (r) = Recorded By, (t) = Taken By, (c) = Cosigned By    Initials Name Provider Type    Christopher Alvarenga, PT Physical Therapist                             Physical Therapy Education                 Title: PT OT SLP Therapies (In Progress)     Topic: Physical Therapy (In Progress)     Point: Mobility training (Done)     Learning Progress Summary           Patient Acceptance, E, VU by ROSANNA at 11/15/2021 1052    Comment: HHPT, PT POC, goals updated.    Acceptance, E, VU by STELLA at 11/12/2021 0903    Comment: Role of PT, POC, use of gait belt                   Point: Home exercise program (Not Started)     Learner Progress:  Not documented in this visit.          Point: Body mechanics (Not Started)     Learner Progress:  Not documented in this visit.          Point: Precautions (Done)     Learning Progress Summary           Patient Acceptance, E, VU by STELLA at 11/12/2021 0903    Comment: Role of PT, POC, use of gait belt                               User Key     Initials  Effective Dates Name Provider Type Discipline    CZ 06/16/21 -  Christopher Arnett, PT Physical Therapist PT    KW 06/16/21 -  Hilaria Jerez PT Physical Therapist PT              PT Recommendation and Plan  Planned Therapy Interventions (PT): balance training, bed mobility training, gait training, patient/family education, transfer training, strengthening, stretching  Plan of Care Reviewed With: patient  Outcome Summary: PT re-evaluation complete.  Patient alert and cooperative.  She requires SPV with transfers and gait, ambulating 15'x2 with FWW, wide stance, no LOB, good use of walker.  Patient likely to discharge home today, will need HHPT; family is available to assist patient.  Goals reviewed/updated, continue skilled I/P PT.     Time Calculation:    PT Charges     Row Name 11/15/21 1055             Time Calculation    Start Time 0955  -CZ      Stop Time 1033  -CZ      Time Calculation (min) 38 min  -CZ      PT Received On 11/15/21  -CZ      PT Goal Re-Cert Due Date 11/28/21  -CZ              Time Calculation- PT    Total Timed Code Minutes- PT 15 minute(s)  -CZ              Timed Charges    92655 - PT Therapeutic Activity Minutes 15  -CZ              Untimed Charges    PT Eval/Re-eval Minutes 23  -CZ              Total Minutes    Timed Charges Total Minutes 15  -CZ      Untimed Charges Total Minutes 23  -CZ       Total Minutes 38  -CZ            User Key  (r) = Recorded By, (t) = Taken By, (c) = Cosigned By    Initials Name Provider Type    CZ Christopher Arnett, PT Physical Therapist              Therapy Charges for Today     Code Description Service Date Service Provider Modifiers Qty    12725634334 HC PT THERAPEUTIC ACT EA 15 MIN 11/15/2021 Christopher Arnett, PT GP 1    10859372756 HC PT RE-EVAL ESTABLISHED PLAN 2 11/15/2021 Christopher Arnett, PT GP 1          PT G-Codes  Outcome Measure Options: AM-PAC 6 Clicks Basic Mobility (PT)  AM-PAC 6 Clicks Score (PT): 18  AM-PAC 6 Clicks Score (OT): 19    Christopher FLORES  Melquiades, PT  11/15/2021

## 2021-11-15 NOTE — PLAN OF CARE
Goal Outcome Evaluation:  Plan of Care Reviewed With: patient           Outcome Summary: PT re-evaluation complete.  Patient alert and cooperative.  She requires SPV with transfers and gait, ambulating 15'x2 with FWW, wide stance, no LOB, good use of walker.  Patient likely to discharge home today, will need HHPT; family is available to assist patient.  Goals reviewed/updated, continue skilled I/P PT.

## 2021-11-15 NOTE — PROGRESS NOTES
FAMILY MEDICINE DAILY PROGRESS NOTE    NAME: Yamileth Slater  : 1960  MRN: [id number]      LOS: 3 days     PROVIDER OF SERVICE: Alvarado Mauricio MD    Chief Complaint: Coronary artery disease    Subjective:     Interval History: History taken from: patient    Afebrile. Vital signs stable.  Satting 95% on 3 L nasal cannula.    Patient status post 2 days from left heart catheterization with percutaneous intervention.  Also status post 2 days from hemodialysis.  Next hemodialysis session scheduled for tomorrow.    Patient is also status post 2 units of packed red blood cell transfusion yesterday.  Hemoglobin improved from 7-8.6.     Patient has no complaints today. She is no longer experiencing chest pain.    We will be following up with cardiology as well as endocrinology, which evaluated the patient during her stay.  We will also be working with case management for discharge placement now that her chest pain has resolved as this lady came from a rehabilitation facility.  Anticipate discharge soon.      Review of Systems:   Review of Systems   Constitutional: Negative for chills, fatigue and fever.   Respiratory: Negative for shortness of breath and wheezing.    Cardiovascular: Negative for chest pain, palpitations and leg swelling.   Gastrointestinal: Negative for abdominal pain, nausea and vomiting.   Genitourinary: Negative for difficulty urinating and dysuria.   Neurological: Negative for dizziness, weakness and light-headedness.       Objective:     Vital Signs  Temp:  [97 °F (36.1 °C)-98.1 °F (36.7 °C)] 97 °F (36.1 °C)  Heart Rate:  [59-78] 72  Resp:  [16-20] 18  BP: (111-181)/(53-77) 156/70  Body mass index is 54.6 kg/m².    Physical Exam  Physical Exam  Constitutional:       General: She is not in acute distress.     Appearance: She is obese. She is not toxic-appearing or diaphoretic.      Comments: Nasal cannula in place   Cardiovascular:      Rate and Rhythm: Normal rate and regular rhythm.    Pulmonary:      Effort: Pulmonary effort is normal.      Breath sounds: Normal breath sounds.   Abdominal:      Tenderness: There is no abdominal tenderness. There is no guarding.   Musculoskeletal:      Right lower leg: No edema.      Left lower leg: No edema.         Medication Review    Current Facility-Administered Medications:   •  acetaminophen (TYLENOL) tablet 650 mg, 650 mg, Oral, Q8H PRN, Alvarado Mauricio MD, 650 mg at 11/14/21 1146  •  albuterol (PROVENTIL) nebulizer solution 0.083% 2.5 mg/3mL, 2.5 mg, Nebulization, Q4H PRN, Alvarado Mauricio MD  •  aspirin EC tablet 81 mg, 81 mg, Oral, Daily, Alvarado Mauricio MD, 81 mg at 11/14/21 0859  •  atorvastatin (LIPITOR) tablet 20 mg, 20 mg, Oral, Daily, Alvarado Mauricio MD, 20 mg at 11/14/21 0859  •  bumetanide (BUMEX) tablet 2 mg, 2 mg, Oral, Once per day on Sun Mon Wed Fri, Alvarado Mauricio MD, 2 mg at 11/14/21 0859  •  cetirizine (zyrTEC) tablet 10 mg, 10 mg, Oral, Daily, Alvarado Mauricio MD, 10 mg at 11/14/21 0859  •  clopidogrel (PLAVIX) tablet 75 mg, 75 mg, Oral, Daily, Alvarado Mauricio MD, 75 mg at 11/14/21 0859  •  cyclobenzaprine (FLEXERIL) tablet 10 mg, 10 mg, Oral, BID PRN, Alvarado Mauricio MD  •  dextrose (D50W) (25 g/50 mL) IV injection 25 g, 25 g, Intravenous, Q15 Min PRN, Alvarado Mauricio MD  •  dextrose (GLUTOSE) oral gel 15 g, 15 g, Oral, Q15 Min PRN, Alvarado Mauricio MD  •  DULoxetine (CYMBALTA) DR capsule 20 mg, 20 mg, Oral, Daily, Alvarado Mauricio MD, 20 mg at 11/14/21 0858  •  [START ON 11/16/2021] epoetin hubert-epbx (RETACRIT) injection 10,000 Units, 10,000 Units, Intravenous, Once per day on Tue Thu Sat, Alvarado Mauricio MD  •  ferrous sulfate EC tablet 324 mg, 324 mg, Oral, Daily With Breakfast, Alvarado Mauricio MD, 324 mg at 11/14/21 0858  •  fluticasone (FLONASE) 50 MCG/ACT nasal spray 2 spray, 2 spray, Each Nare, Daily, Alvarado Mauricio MD, 2 spray at 11/11/21 0802  •  gabapentin (NEURONTIN) capsule 400 mg, 400 mg, Oral, TID, Luly  Alvarado SEPARZA MD, 400 mg at 11/14/21 2104  •  glucagon (human recombinant) (GLUCAGEN DIAGNOSTIC) injection 1 mg, 1 mg, Subcutaneous, Q15 Min PRN, Alvarado Mauricio MD  •  heparin (porcine) 5000 UNIT/ML injection 5,000 Units, 5,000 Units, Subcutaneous, Q12H, Alvarado Mauricio MD, 5,000 Units at 11/14/21 2104  •  heparin (porcine) injection 2,000 Units, 2,000 Units, Intracatheter, PRN, Alvarado Mauricio MD, 2,000 Units at 11/13/21 1757  •  heparin (porcine) injection 2,000 Units, 2,000 Units, Intracatheter, PRN, Alvarado Mauricio MD, 2,000 Units at 11/13/21 1755  •  influenza vac split quad (FLUZONE,FLUARIX,AFLURIA,FLULAVAL) injection 0.5 mL, 0.5 mL, Intramuscular, During Hospitalization, Alvarado Mauricio MD  •  insulin aspart (novoLOG) injection 3-12 Units, 3-12 Units, Subcutaneous, 4x Daily AC & at Bedtime, Alvarado Mauricio MD, 3 Units at 11/14/21 2140  •  insulin aspart (novoLOG) injection 3-12 Units, 3-12 Units, Subcutaneous, Q24H, Alvarado Mauricio MD  •  insulin aspart (novoLOG) injection 5-20 Units, 5-20 Units, Subcutaneous, TID With Meals, Alvarado Mauricio MD, 15 Units at 11/14/21 1805  •  insulin detemir (LEVEMIR) injection 50 Units, 50 Units, Subcutaneous, Daily, Alvarado Mauricio MD, 50 Units at 11/14/21 0725  •  ipratropium-albuterol (DUO-NEB) nebulizer solution 3 mL, 3 mL, Nebulization, 4x Daily - RT, Alvarado Mauricio MD, 3 mL at 11/15/21 0709  •  isosorbide mononitrate (IMDUR) 24 hr tablet 30 mg, 30 mg, Oral, QAM, Alvarado Mauricio MD, 30 mg at 11/15/21 0553  •  levothyroxine (SYNTHROID, LEVOTHROID) tablet 25 mcg, 25 mcg, Oral, Daily, Alvarado Mauricio MD, 25 mcg at 11/14/21 0859  •  lidocaine (LIDODERM) 5 % 1 patch, 1 patch, Transdermal, Q24H, Alvarado Mauricio MD, 1 patch at 11/14/21 1027  •  lisinopril (PRINIVIL,ZESTRIL) tablet 5 mg, 5 mg, Oral, Daily, Alvarado Mauricio MD, 5 mg at 11/14/21 0900  •  melatonin tablet 1.5 mg, 1.5 mg, Oral, Nightly PRN, Alvarado Mauricio MD, 1.5 mg at 11/14/21 2104  •  metoprolol  tartrate (LOPRESSOR) tablet 50 mg, 50 mg, Oral, Q12H, Alvarado Mauricio MD, 50 mg at 11/14/21 2104  •  montelukast (SINGULAIR) tablet 10 mg, 10 mg, Oral, Nightly, Alvarado Mauricio MD, 10 mg at 11/14/21 2104  •  morphine injection 4 mg, 4 mg, Intravenous, Q4H PRN, 4 mg at 11/14/21 1433 **AND** naloxone (NARCAN) injection 0.4 mg, 0.4 mg, Intravenous, Q5 Min PRN, Alvarado Mauricio MD  •  nitroglycerin (NITROSTAT) SL tablet 0.4 mg, 0.4 mg, Sublingual, Q5 Min PRN, Alvarado Mauricio MD  •  nystatin (MYCOSTATIN) powder, , Topical, TID, Alvarado Mauricio MD, Given at 11/14/21 2104  •  ondansetron (ZOFRAN) injection 4 mg, 4 mg, Intravenous, Q6H PRN, Alvarado Mauricio MD  •  ondansetron ODT (ZOFRAN-ODT) disintegrating tablet 4 mg, 4 mg, Oral, Q8H PRN, Alvarado Mauricio MD  •  oxybutynin XL (DITROPAN-XL) 24 hr tablet 5 mg, 5 mg, Oral, Daily, Alvarado Mauricio MD, 5 mg at 11/14/21 0859  •  pantoprazole (PROTONIX) EC tablet 40 mg, 40 mg, Oral, Nightly, Alvarado Mauricio MD, 40 mg at 11/14/21 2104  •  promethazine (PHENERGAN) tablet 25 mg, 25 mg, Oral, Q6H PRN, Alvarado Mauricio MD  •  ranolazine (RANEXA) 12 hr tablet 500 mg, 500 mg, Oral, Q12H, Alvarado Mauricio MD, 500 mg at 11/14/21 2104  •  sennosides-docusate (PERICOLACE) 8.6-50 MG per tablet 2 tablet, 2 tablet, Oral, BID, Alvarado Mauricio MD, 2 tablet at 11/14/21 2104  •  sodium chloride 0.9 % flush 10 mL, 10 mL, Intravenous, PRN, Alvarado Mauricio MD  •  sodium chloride 0.9 % flush 10 mL, 10 mL, Intravenous, Q12H, Alvarado Mauricio MD, 10 mL at 11/14/21 0902  •  sodium chloride 0.9 % flush 10 mL, 10 mL, Intravenous, PRN, Alvarado Mauricio MD  •  sodium chloride 0.9 % infusion, 75 mL/hr, Intravenous, Continuous, Alvarado Mauricio MD, Last Rate: 75 mL/hr at 11/14/21 1454, 75 mL/hr at 11/14/21 1454     Diagnostic Data    Lab Results (last 24 hours)     Procedure Component Value Units Date/Time    Comprehensive Metabolic Panel [[id number]]  (Abnormal) Collected: 11/15/21 0533    Specimen:  Blood Updated: 11/15/21 0654     Glucose 212 mg/dL      BUN 36 mg/dL      Creatinine 4.35 mg/dL      Sodium 127 mmol/L      Potassium 4.6 mmol/L      Chloride 91 mmol/L      CO2 22.0 mmol/L      Calcium 9.0 mg/dL      Total Protein 7.7 g/dL      Albumin 3.30 g/dL      ALT (SGPT) 7 U/L      AST (SGOT) 15 U/L      Alkaline Phosphatase 121 U/L      Total Bilirubin 0.2 mg/dL      eGFR Non African Amer 10 mL/min/1.73      Comment: <15 Indicative of kidney failure.        eGFR   Amer --     Comment: <15 Indicative of kidney failure.        Globulin 4.4 gm/dL      A/G Ratio 0.8 g/dL      BUN/Creatinine Ratio 8.3     Anion Gap 14.0 mmol/L     Narrative:      GFR Normal >60  Chronic Kidney Disease <60  Kidney Failure <15      Iron Profile [[id number]]  (Abnormal) Collected: 11/15/21 0533    Specimen: Blood Updated: 11/15/21 0650     Iron 36 mcg/dL      Iron Saturation 14 %      Transferrin 171 mg/dL      TIBC 255 mcg/dL     Ferritin [[id number]]  (Abnormal) Collected: 11/15/21 0533    Specimen: Blood Updated: 11/15/21 0648     Ferritin 152.60 ng/mL     Narrative:      Results may be falsely decreased if patient taking Biotin.      POC Glucose Once [[id number]]  (Abnormal) Collected: 11/15/21 0615    Specimen: Blood Updated: 11/15/21 0635     Glucose 277 mg/dL      Comment: RN NotifiedOperator: [id number] DARIEN Cruz ID: [id number]       CBC & Differential [[id number]]  (Abnormal) Collected: 11/15/21 0533    Specimen: Blood Updated: 11/15/21 0609    Narrative:      The following orders were created for panel order CBC & Differential.  Procedure                               Abnormality         Status                     ---------                               -----------         ------                     CBC Auto Differential[[id number]]        Abnormal            Final result                 Please view results for these tests on the individual orders.    CBC Auto Differential [[id number]]  (Abnormal) Collected:  11/15/21 0533    Specimen: Blood Updated: 11/15/21 0609     WBC 9.11 10*3/mm3      RBC 3.10 10*6/mm3      Hemoglobin 8.6 g/dL      Hematocrit 25.9 %      MCV 83.5 fL      MCH 27.7 pg      MCHC 33.2 g/dL      RDW 14.8 %      RDW-SD 44.6 fl      MPV 8.5 fL      Platelets 241 10*3/mm3      Neutrophil % 66.0 %      Lymphocyte % 20.2 %      Monocyte % 7.8 %      Eosinophil % 3.7 %      Basophil % 0.8 %      Immature Grans % 1.5 %      Neutrophils, Absolute 6.01 10*3/mm3      Lymphocytes, Absolute 1.84 10*3/mm3      Monocytes, Absolute 0.71 10*3/mm3      Eosinophils, Absolute 0.34 10*3/mm3      Basophils, Absolute 0.07 10*3/mm3      Immature Grans, Absolute 0.14 10*3/mm3      nRBC 0.0 /100 WBC     POC Glucose Once [[id number]]  (Abnormal) Collected: 11/14/21 1712    Specimen: Blood Updated: 11/14/21 1724     Glucose 135 mg/dL      Comment: RN NotifiedOperator: [id number] MARKS ALEXISMeter ID: [id number]       POC Glucose Once [[id number]]  (Abnormal) Collected: 11/13/21 0633    Specimen: Blood Updated: 11/14/21 1649     Glucose 387 mg/dL      Comment: RN NotifiedOperator: [id number] PATRICIA LEHMANNEYMeter ID: [id number]       POC Glucose Once [[id number]]  (Abnormal) Collected: 11/14/21 1142    Specimen: Blood Updated: 11/14/21 1217     Glucose 254 mg/dL      Comment: RN NotifiedOperator: [id number] DARIEN ESPINOIKAMeter ID: [id number]       POC Glucose Once [[id number]]  (Abnormal) Collected: 11/14/21 0616    Specimen: Blood Updated: 11/14/21 1215     Glucose 151 mg/dL      Comment: RN NotifiedOperator: [id number] TRACE WIGGINSAHMeter ID: [id number]       Hemoglobin & Hematocrit, Blood [[id number]]  (Abnormal) Collected: 11/14/21 1011    Specimen: Blood Updated: 11/14/21 1044     Hemoglobin 7.0 g/dL      Hematocrit 22.0 %     POC Glucose Once [[id number]]  (Abnormal) Collected: 11/14/21 0917    Specimen: Blood Updated: 11/14/21 0944     Glucose 187 mg/dL      Comment: : [id number] FALLON Pratt ID:  [id number]       POC Glucose Once [[id number]]  (Abnormal) Collected: 11/14/21 0720    Specimen: Blood Updated: 11/14/21 0851     Glucose 158 mg/dL      Comment: : [id number] FALLON Pratt ID: [id number]               I reviewed the patient's new clinical results.    Assessment/Plan:     Active Hospital Problems    Diagnosis  POA   • **Coronary artery disease [I25.10]  Yes     -S/P CABG x 3 (Primary), Bilateral carotid artery stenosis w/ 2 stents on left side,  PAD (peripheral artery disease) with stent in right upper leg, Morbid obesity   -Continue aspirin, Plavix, atorvastatin, lisinopril,  isosorbide mononitrate 60 mg daily     -EKG: No ST segment changes.   -Troponin negative  -Underwent heart catheterization, stents placed in left main circumflex and ostial circumflex artery. Patient also received thrombectomy of left main coronary artery and selective cannulation of left internal mammary artery of saphenous vein graft to right coronary artery          • Anemia [D64.9]  Yes     -Anemia of chronic disease vs GI bleed  -No active bleeding on recent EGD/Colonoscopy  -Recent capsule endoscopy which showed few small nonbleeding intestinal AVMs and single small intestinal polyp which GI planned to follow outpatient  -Hematoma during heart cath  -Continue to monitor H/H  -Will transfuse if Hgb <7  -Continue home ferrous sulfate  -will start iron transfusions based on iron studies/ferritin   -Nephrology to start epogen with hemodialysis     • Type 2 diabetes mellitus with hyperglycemia (HCC) [E11.65]  Yes     Dr. Stephenson of Endocrinology consulted, initiated patient on insulin drip with plans to switch to basal, bolus insulin  Is considering initiating GLP 1 and/or sglt 2 prior to discharge          • Pneumonitis [J18.9]  Unknown   • Acute on chronic congestive heart failure (HCC) [I50.9]  Yes     - Continue Imdur, metoprolol, lisinopril      • CKD (chronic kidney disease), symptom management only, stage  5 ([facility]) [N18.5]  Yes     Results from last 7 days   Lab Units 11/14/21  0503 11/14/21  0041 11/13/21  1841   CREATININE mg/dL 2.77* 2.61* 1.95*     -Completes outpatient HD on TTS  -Nephrology on board. Will require HD in house     • COPD (chronic obstructive pulmonary disease) ([facility]) [J44.9]  Yes     -O2 supplementation  -Home inhalers as needed  -DuoNebs  -CPAP at night          DVT prophylaxis: Heparin  Code status is   Code Status and Medical Interventions:   Ordered at: 11/08/21 1734     Code Status (Patient has no pulse and is not breathing):    CPR (Attempt to Resuscitate)     Medical Interventions (Patient has pulse or is breathing):    Full Support       Plan for disposition:To place of residence in 1-2 days       Time: 15 min         This document has been electronically signed by Alvarado Mauricio MD on November 15, 2021 07:37 CST    Part of the note may be an electronic transcription or translation of spoken language to printed text using the Dragon Dictation System

## 2021-11-15 NOTE — PROGRESS NOTES
DISCHARGE SUMMARY    PATIENT NAME: Yamileth Slater         PHYSICIAN: Alvarado Mauricio MD  : 1960  MRN: [id number]    ADMITTED: 2021     DISCHARGED: 11/15/2021    ADMISSION DIAGNOSES:  Active Hospital Problems    Diagnosis  POA   • **Coronary artery disease [I25.10]  Yes   • Anemia [D64.9]  Yes   • Type 2 diabetes mellitus with hyperglycemia (HCC) [E11.65]  Yes   • Pneumonitis [J18.9]  Unknown   • Chronic diastolic congestive heart failure (HCC) [I50.32]  Yes   • CKD (chronic kidney disease), symptom management only, stage 5 (HCC) [N18.5]  Yes   • COPD (chronic obstructive pulmonary disease) (HCC) [J44.9]  Yes      Resolved Hospital Problems   No resolved problems to display.       DISCHARGE DIAGNOSES:   Active Hospital Problems    Diagnosis  POA   • **Coronary artery disease [I25.10]  Yes   • Anemia [D64.9]  Yes   • Type 2 diabetes mellitus with hyperglycemia (HCC) [E11.65]  Yes   • Pneumonitis [J18.9]  Unknown   • Chronic diastolic congestive heart failure (HCC) [I50.32]  Yes   • CKD (chronic kidney disease), symptom management only, stage 5 (HCC) [N18.5]  Yes   • COPD (chronic obstructive pulmonary disease) (HCC) [J44.9]  Yes      Resolved Hospital Problems   No resolved problems to display.       SERVICE: [facility]  Attending: Alex Wells MD  Resident: Alvarado Mauricio MD    CONSULTS:   Consult Orders (all) (From admission, onward)     Start     Ordered    21 1133  Inpatient Endocrinology Consult  Once        Specialty:  Endocrinology  Provider:  (Not yet assigned)    21 1133    21 1155  Inpatient Cardiology Consult  Once        Specialty:  Cardiology  Provider:  (Not yet assigned)    21 1157    21 1816  Inpatient Case Management  Consult  Once        Provider:  (Not yet assigned)    21 1815    21 1703  Inpatient Nephrology Consult  Once        Specialty:  Nephrology  Provider:  Ace Lauren MD    21 170                 PROCEDURES:   Nuclear stress test  Left heart catheterization  Hemodialysis    HISTORY OF PRESENT ILLNESS:     Retrieved from history and physical exam by Carline Coffey on 11/8/2021:    Yamileth Slater is a 61 y.o. female with a CMH of HTN, CAD, HLD, DM, CKD stage IV, COPD, and chronic respiratory failure on 3 L oxygen at home who presents complaining of chest pain. She was sitting in recliner at [facility] and started having sudden, intermittent, squeezing/pressure-like, substernal chest pain which radiated to her elbow. Deep breaths make it worse.      In the ED, patient was given Aspirin 324, nitro ointment for chest pain. 10 Units insulin were given for hyperglycemia. Chest pain resolved thereafter.     Of note, she was admitted from 10/11/21-10/28/21 for GI bleed foor which she required blood transfusion. She also had a capsule endoscopy which showed few small nonbleeding intestinal AVMs and single small intestinal polyp which GI planned to follow outpatient. She also receives outpatient HD on TTS. She was discharged to [facility]. She was admitted to  service for ACS rule out.    DIAGNOSTIC DATA:   Lab Results (last 24 hours)     Procedure Component Value Units Date/Time    COVID PRE-OP / PRE-PROCEDURE SCREENING ORDER (NO ISOLATION) - Swab, Nasopharynx [[id number]] Collected: 11/15/21 1102    Specimen: Swab from Nasopharynx Updated: 11/15/21 1102    Narrative:      The following orders were created for panel order COVID PRE-OP / PRE-PROCEDURE SCREENING ORDER (NO ISOLATION) - Swab, Nasopharynx.  Procedure                               Abnormality         Status                     ---------                               -----------         ------                     COVID-19MUMTAZ/SETH IN-...[[id number]]                      In process                   Please view results for these tests on the individual orders.    COVID-MUMTAZ Osei/SETH IN-HOUSE, NP SWAB IN TRANSPORT MEDIA 8-10 HR TAT - Swab,  Nasopharynx [[id number]] Collected: 11/15/21 1102    Specimen: Swab from Nasopharynx Updated: 11/15/21 1102    Comprehensive Metabolic Panel [[id number]]  (Abnormal) Collected: 11/15/21 0533    Specimen: Blood Updated: 11/15/21 0654     Glucose 212 mg/dL      BUN 36 mg/dL      Creatinine 4.35 mg/dL      Sodium 127 mmol/L      Potassium 4.6 mmol/L      Chloride 91 mmol/L      CO2 22.0 mmol/L      Calcium 9.0 mg/dL      Total Protein 7.7 g/dL      Albumin 3.30 g/dL      ALT (SGPT) 7 U/L      AST (SGOT) 15 U/L      Alkaline Phosphatase 121 U/L      Total Bilirubin 0.2 mg/dL      eGFR Non African Amer 10 mL/min/1.73      Comment: <15 Indicative of kidney failure.        eGFR   Amer --     Comment: <15 Indicative of kidney failure.        Globulin 4.4 gm/dL      A/G Ratio 0.8 g/dL      BUN/Creatinine Ratio 8.3     Anion Gap 14.0 mmol/L     Narrative:      GFR Normal >60  Chronic Kidney Disease <60  Kidney Failure <15      Iron Profile [[id number]]  (Abnormal) Collected: 11/15/21 0533    Specimen: Blood Updated: 11/15/21 0650     Iron 36 mcg/dL      Iron Saturation 14 %      Transferrin 171 mg/dL      TIBC 255 mcg/dL     Ferritin [[id number]]  (Abnormal) Collected: 11/15/21 0533    Specimen: Blood Updated: 11/15/21 0648     Ferritin 152.60 ng/mL     Narrative:      Results may be falsely decreased if patient taking Biotin.      POC Glucose Once [[id number]]  (Abnormal) Collected: 11/15/21 0615    Specimen: Blood Updated: 11/15/21 0635     Glucose 277 mg/dL      Comment: RN NotifiedOperator: [id number] DARIEN WIGGINSTiffany ID: [id number]       CBC & Differential [[id number]]  (Abnormal) Collected: 11/15/21 0533    Specimen: Blood Updated: 11/15/21 0609    Narrative:      The following orders were created for panel order CBC & Differential.  Procedure                               Abnormality         Status                     ---------                               -----------         ------                     CBC Auto  Detail Level: Zone Differential[[id number]]        Abnormal            Final result                 Please view results for these tests on the individual orders.    CBC Auto Differential [[id number]]  (Abnormal) Collected: 11/15/21 0533    Specimen: Blood Updated: 11/15/21 0609     WBC 9.11 10*3/mm3      RBC 3.10 10*6/mm3      Hemoglobin 8.6 g/dL      Hematocrit 25.9 %      MCV 83.5 fL      MCH 27.7 pg      MCHC 33.2 g/dL      RDW 14.8 %      RDW-SD 44.6 fl      MPV 8.5 fL      Platelets 241 10*3/mm3      Neutrophil % 66.0 %      Lymphocyte % 20.2 %      Monocyte % 7.8 %      Eosinophil % 3.7 %      Basophil % 0.8 %      Immature Grans % 1.5 %      Neutrophils, Absolute 6.01 10*3/mm3      Lymphocytes, Absolute 1.84 10*3/mm3      Monocytes, Absolute 0.71 10*3/mm3      Eosinophils, Absolute 0.34 10*3/mm3      Basophils, Absolute 0.07 10*3/mm3      Immature Grans, Absolute 0.14 10*3/mm3      nRBC 0.0 /100 WBC     POC Glucose Once [[id number]]  (Abnormal) Collected: 11/14/21 1712    Specimen: Blood Updated: 11/14/21 1724     Glucose 135 mg/dL      Comment: RN NotifiedOperator: [id number] MARKS ALEXISMeter ID: [id number]       POC Glucose Once [[id number]]  (Abnormal) Collected: 11/13/21 0633    Specimen: Blood Updated: 11/14/21 1649     Glucose 387 mg/dL      Comment: RN NotifiedOperator: [id number] PATRICIA POLLOCKMeter ID: [id number]       POC Glucose Once [[id number]]  (Abnormal) Collected: 11/14/21 1142    Specimen: Blood Updated: 11/14/21 1217     Glucose 254 mg/dL      Comment: RN NotifiedOperator: [id number] DARIEN CLAIREMeter ID: [id number]       POC Glucose Once [[id number]]  (Abnormal) Collected: 11/14/21 0616    Specimen: Blood Updated: 11/14/21 1215     Glucose 151 mg/dL      Comment: RN NotifiedOperator: [id number] TRACE WIGGINSAHMeter ID: [id number]           Imaging Results (Last 72 Hours)     ** No results found for the last 72 hours. **             HOSPITAL COURSE:    The patient was admitted to the hospital for  ACS rule out.    ACS rule out:  ECG showed no ST segment changes.  Troponins were negative.  Cardiology was consulted and the patient received a nuclear stress test that was consistent with an intermediate risk study.  The patient then received a left heart catheterization with stenting of the left distal main and ostial circumflex arteries and thrombectomy of the left main coronary artery. The chest pain subsequently resolved.  She was started on Ranexa 500 mg twice daily by cardiology and her dose of Imdur was adjusted down to 30 mg daily prior to discharge.  Her other medicines were continued.    Diabetes:  Dr. Elijah Stephenson of endocrinology was consulted due to the patient's persistent hyperglycemia.  He started her on an insulin drip and then switched her to subcutaneous insulin and performed a carb count.  It was thought that the patient's insulin requirements were consistent with her known weight and that her persistent hyperglycemia is secondary to her persistent bad habit of eating foods that are contrary to what should be a diabetic consistent carbohydrate diet.  Dr. Stephenson asked to see her outpatient on Monday, November 22 at 11:30 AM and this appointment was set up.  In the meantime, the patient was to be continued on 60 mg of long-acting Levemir per Dr. Stephenson.    End-stage renal disease:  The patient has end-stage renal disease and has been on hemodialysis.  She received hemodialysis several times during her hospital stay.  Per discharge, nephrology documented no concern with hyponatremia in the setting of modulation with hemodialysis.  They recommended keeping vitamin B12 and folate supplementation.   Her dose of lisinopril was decreased from 10 mg to 5 mg by nephrology during her stay.  She will be following up outpatient with Dr. Lauren of nephrology.    Anemia:  The patient has a normocytic anemia consistent with her chronic disease state and end-stage renal function.  She did receive 2 units  of blood transfusion after her catheterization, due to hematoma.  After this, her hemoglobin remained stable. The patient will now begin getting IV iron and Epogen with hemodialysis in the outpatient setting.         DISCHARGE CONDITION:   Stable     DISPOSITION:  Home-Health Care [facility]    DISCHARGE MEDICATIONS     Discharge Medications      New Medications      Instructions Start Date   epoetin hubert-epbx 64098 UNIT/ML injection  Commonly known as: RETACRIT   10,000 Units, Subcutaneous, 3 Times Weekly   Start Date: November 17, 2021     fluticasone 50 MCG/ACT nasal spray  Commonly known as: FLONASE   2 sprays, Each Nare, Daily   Start Date: November 16, 2021     insulin detemir 100 UNIT/ML injection  Commonly known as: LEVEMIR   60 Units, Subcutaneous, Daily   Start Date: November 16, 2021     ranolazine 500 MG 12 hr tablet  Commonly known as: RANEXA   500 mg, Oral, Every 12 Hours Scheduled         Changes to Medications      Instructions Start Date   isosorbide mononitrate 30 MG 24 hr tablet  Commonly known as: IMDUR  What changed:   · medication strength  · how much to take   30 mg, Oral, Every Morning      lisinopril 5 MG tablet  Commonly known as: SARAHZESTRIL  What changed:   · medication strength  · how much to take   5 mg, Oral, Daily   Start Date: November 16, 2021        Continue These Medications      Instructions Start Date   acetaminophen 650 MG 8 hr tablet  Commonly known as: Tylenol 8 Hour   650 mg, Oral, Every 8 Hours PRN      Adult Aspirin Regimen 81 MG EC tablet  Generic drug: aspirin   81 mg, Oral, Daily      albuterol sulfate  (90 Base) MCG/ACT inhaler  Commonly known as: PROVENTIL HFA;VENTOLIN HFA;PROAIR HFA   2 puffs, Inhalation, Every 4 Hours PRN      albuterol (2.5 MG/3ML) 0.083% nebulizer solution  Commonly known as: PROVENTIL   Inhale the contents of 1 vial by nebulization Every 4 (Four) Hours As Needed for Wheezing.      atorvastatin 20 MG tablet  Commonly known as: LIPITOR   TAKE  ONE TABLET BY MOUTH EVERY NIGHT AT BEDTIME      bumetanide 2 MG tablet  Commonly known as: BUMEX   2 mg, Oral, 4 Times Weekly      cetirizine 10 MG tablet  Commonly known as: zyrTEC   10 mg, Oral, Daily      clopidogrel 75 MG tablet  Commonly known as: PLAVIX   75 mg, Oral, Daily      [employer] Blood Glucose Meter w/Device kit   1 each, Does not apply, 3 Times Daily      cyclobenzaprine 10 MG tablet  Commonly known as: FLEXERIL   10 mg, Oral, 2 Times Daily PRN      DULoxetine 20 MG capsule  Commonly known as: CYMBALTA   20 mg, Oral, Daily      Easy Touch Pen Needles 31G X 8 MM misc  Generic drug: Insulin Pen Needle   No dose, route, or frequency recorded.      NovoFine 30G X 8 MM misc  Generic drug: Insulin Pen Needle   As directed 4 times daily      Insulin Pen Needle 31G X 8 MM misc   Use to inject insulin 4 (Four) Times a Day as directed.      ferrous sulfate 325 (65 FE) MG tablet  Commonly known as: FeroSul   325 mg, Oral, Daily With Breakfast      FreeStyle Jade 2 [place] device   1 each, Does not apply, Continuous      FreeStyle Jade 2 Sensor misc   1 each, Does not apply, Every 14 Days      gabapentin 400 MG capsule  Commonly known as: NEURONTIN   400 mg, Oral, 3 Times Daily      glucose blood test strip   Use as instructed      insulin aspart 100 UNIT/ML solution pen-injector sc pen  Commonly known as: novoLOG FLEXPEN   30 Units, Subcutaneous, 3 Times Daily With Meals      ipratropium-albuterol 0.5-2.5 mg/3 ml nebulizer  Commonly known as: DUO-NEB   3 mL, Nebulization, 4 Times Daily - RT      levothyroxine 25 MCG tablet  Commonly known as: SYNTHROID, LEVOTHROID   25 mcg, Oral, Daily      lidocaine 5 %  Commonly known as: LIDODERM   APPLY TO THE AFFECTED AREA(S) TOPICALLY TWO TIMES A DAY. REMOVE NIGHTLY      metoprolol tartrate 50 MG tablet  Commonly known as: LOPRESSOR   TAKE ONE TABLET BY MOUTH TWO TIMES A DAY. HOLD IF PULSE IS LESS THAN 60      montelukast 10 MG tablet  Commonly known as: SINGULAIR   10 mg,  Oral, Nightly      nitroglycerin 0.4 MG SL tablet  Commonly known as: NITROSTAT   0.4 mg, Sublingual, Every 5 Minutes PRN      nystatin 118052 UNIT/GM powder  Commonly known as: MYCOSTATIN   Topical, 3 Times Daily      ondansetron ODT 4 MG disintegrating tablet  Commonly known as: Zofran ODT   4 mg, Translingual, Every 8 Hours PRN      oxybutynin XL 5 MG 24 hr tablet  Commonly known as: DITROPAN-XL   5 mg, Oral, Daily      pantoprazole 40 MG EC tablet  Commonly known as: PROTONIX   40 mg, Oral, Nightly      promethazine 25 MG tablet  Commonly known as: PHENERGAN   25 mg, Oral, Every 6 Hours PRN      sennosides-docusate 8.6-50 MG per tablet  Commonly known as: PERICOLACE   2 tablets, Oral, 2 Times Daily         Stop These Medications    sodium bicarbonate 650 MG tablet     Tresiba FlexTouch 100 UNIT/ML solution pen-injector injection  Generic drug: insulin degludec     Trulicity 0.75 MG/0.5ML solution pen-injector  Generic drug: Dulaglutide            INSTRUCTIONS:  Activity:   Activity Instructions     Activity as Tolerated          Diet:   Diet Instructions     Diet: Consistent Carbohydrate      Discharge Diet: Consistent Carbohydrate          FOLLOW UP:   Additional Instructions for the Follow-ups that You Need to Schedule     Ambulatory Referral to Home Health   As directed      Face to Face Visit Date: 11/15/2021    Follow-up provider for Plan of Care?: I treated the patient in an acute care facility and will not continue treatment after discharge.    Follow-up provider: MAITE QUINN [[id number]]    Reason/Clinical Findings: Weight, deconditioning    Describe mobility limitations that make leaving home difficult: Weight, deconditioning    Nursing/Therapeutic Services Requested: Physical Therapy Occupational Therapy    PT orders: Other (add comment) (CHF Managment and Medication Education)    Frequency: 1 Week 1         Ambulatory Referral to Home Health   As directed      Face to Face Visit Date: 11/15/2021     Follow-up provider for Plan of Care?: I treated the patient in an acute care facility and will not continue treatment after discharge.    Follow-up provider: RIANNA QUINN [[id number]]    Reason/Clinical Findings: Strengthening/Weakness/ADL difficulty    Describe mobility limitations that make leaving home difficult: patient needs attention to her mobility and has been improving with PT/OT    Nursing/Therapeutic Services Requested: Physical Therapy Occupational Therapy    Occupational orders: Activities of daily living Strengthening    Frequency: 1 Week 1         Discharge Follow-up with PCP   As directed       Currently Documented PCP:    Rianna Quinn MD    PCP Phone Number:    [phone]     Follow Up Details: Within 1 week for post hospital visit         Discharge Follow-up with Specified Provider: Dr. Elijah Downs Endocrinology - Dr. Nava requested appointment on November Monday 22 11:30 am   As directed      To: Dr. Elijah Downs Endocrinology - Dr. Nava requested appointment on November Monday 22 11:30 am    Follow Up Details: Dr. Nava requested appointment on November Monday 22 11:30 am         Discharge Follow-up with Specified Provider: Dr. Lauren of Nephrology; 2 Weeks   As directed      To: Dr. Lauren of Nephrology    Follow Up: 2 Weeks    Follow Up Details: As previously scheduled for dialysis appointments         Discharge Follow-up with Specified Provider: Dr. Bautista of Cardiology; 1 Month   As directed      To: Dr. Bautista of Cardiology    Follow Up: 1 Month    Follow Up Details: Dr. Bautista of Cardiology For Hospitalization for stent placement            Contact information for follow-up providers     Margarito Davis MD. Schedule an appointment as soon as possible for a visit in 1 month(s).    Specialty: Cardiology  Why: 1 month hospital follow up, stent.   Contact information:  [address] [place] [address] [address]  [facility] [address] [facility] [address] [phone]              Detail Level: Simple Rianna Macias MD Follow up.    Specialty: Family Medicine  Why: Within 1 week for post hospital visit  Contact information:  [address] [place] [address] [phone]                   Contact information for after-discharge care     Home Medical Care     [facility] .    Service: Home Health Services  Contact information:  [address] [place] [address] [phone]                             PENDING TEST RESULTS AT DISCHARGE  [unfilled]    Time: 35 minutes was spent in discharge planning, medication reconciliation and coordination of care for this patient.    Alex Wells MD is the attending at time of discharge, He is aware of the patient's status and agrees with the above discharge summary.      This document has been electronically signed by Alvarado Mauricio MD on November 15, 2021 11:56 CST    Part of this note may be an electronic transcription or translation of spoken language to printed text using the Dragon Dictation System

## 2021-11-15 NOTE — PAYOR COMM NOTE
"Komal Rodarte  Case Management Extender  [phone] phone  [phone] fax    Ref# [id number]      Yamileth Slater (61 y.o. 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                 Active Insurance as of 11/8/2021     Primary Coverage     Payor Plan Insurance Group Employer/Plan Group    HUMANA MEDICARE REPLACEMENT HUMANA MEDICARE REPLACEMENT [id number]     Payor Plan Address Payor Plan Phone Number Payor Plan Fax Number Effective Dates    [address] [address] [phone]  7/1/2020 - None Entered    [facility] [id number]       Subscriber Name Subscriber Birth Date Member ID       YAMILETH SLATER 1960 [id number]           Secondary Coverage     Payor Plan Insurance Group Employer/Plan Group    [place]      Payor Plan Address Payor Plan Phone Number Payor Plan Fax Number Effective Dates    [address] [phone]  6/28/2019 - None Entered    [facility] [id number]       Subscriber Name Subscriber Birth Date Member ID       YAMILETH SLATER 1960 [id number]                 Emergency Contacts      (Rel.) Home Phone Work Phone Mobile Phone    BryonHuy (Spouse) [phone] " -- [phone]    Yamileth Chavez (Daughter) [phone] -- [phone]    Suzanne Rivera (Daughter) [phone] -- [phone]               History & Physical      Carline Coffey MD at 21 1552     Attestation signed by Kit Brar MD at 11/10/21 2196    I have performed a history and physical examination of the patient on 21 at 1000am. I have discussed the management of the patient with the resident.  I have reviewed the notes, assessment and plan, and/or procedures  performed by the resident. I concur with the resident’s documentation.    I have noted the following changes from initial resident’s H and P:     Patient states that she is having intermittent episodes of chest pain. She was recently in a SNF for rehab therapy. States that with chest pain, she would have nausea and SOA.     Physical Exam:  General: NAD.  CV: S1 and S2 normal. RRR.  Pulmonary: Lungs clear to auscultation bilaterally.  Abdomen: Bowel sounds present and normal.  Abdomen is soft, and nontender.  Extremities: +1 edema present on lower extremities.     Plan: Nuclear stress test to work up for ischemic causes of chest pain. Nephrology consulted for HD management.       This document has been electronically signed by Kit Brar MD on November 10, 2021 23:13 CST                            HISTORY AND PHYSICAL  NAME: Yamileth Slater  : 1960  MRN: [id number]    DATE OF ADMISSION:  2021     DATE & TIME SEEN: 21 at 21    PCP: Rianna Macisa MD    CODE STATUS: Full code    CHIEF COMPLAINT:  Chest pain    HPI:  Yamileth Slater is a 61 y.o. female with a CMH of HTN, CAD, HLD, DM, CKD stage IV, COPD, and chronic respiratory failure on 3 L oxygen at home who presents complaining of chest pain. She was sitting in recliner at [facility] and started having sudden, intermittent, squeezing/pressure-like, substernal chest pain which radiated to her elbow. Deep breaths make it worse.     In the  ED, patient was given Aspirin 324, nitro ointment for chest pain. 10 Units insulin were given for hyperglycemia. Chest pain resolved thereafter.    Of note, she was admitted from 10/11/21-10/28/21 for GI bleed foor which she required blood transfusion. She also had a capsule endoscopy which showed few small nonbleeding intestinal AVMs and single small intestinal polyp which GI planned to follow outpatient. She also receives outpatient HD on TTS. She was discharged to [facility] SNF. She was admitted to  service for ACS rule out.    CONCURRENT MEDICAL HISTORY:  Past Medical History:   Diagnosis Date   • Acute blood loss anemia 4/16/2017    Likely due to gastric oozing at this time. - Dr. Duarte (GI) was consulted and has now signed off, will follow up outpatient - pill colonoscopy showed AVMs - continue to monitor   • Anxiety    • Carotid artery stenosis    • Chronic obstructive lung disease (HCC)    • CKD (chronic kidney disease) stage 4, GFR 15-29 ml/min ([facility])    • Colonic polyp    • Coronary arteriosclerosis    • Diabetes mellitus (HCC)    • Diabetic neuropathy (HCC)    • Ear pain, right 10/18/2021    - canal trauma due to patient scratching and DMT2 - added cortisporin ear drops   • Elevated troponin 10/12/2021    -most likely from CKD -Trending down -Neg chest pain   • GERD (gastroesophageal reflux disease)    • GI bleed 5/13/2021    - GI will follow up outpatient - Protonix 40mg daily - Avoid medical DVT prophy and use mechanical at this time instead. - Continue to monitor - pill colonoscopy results showed AVMs   • History of transfusion    • Hypercholesterolemia    • Hypertension    • Hypomagnesemia 6/27/2021    Monitor and replace   • Morbid obesity (HCC)    • Nephrolithiasis    • Peripheral vascular disease (HCC)    • Sleep apnea    • Substance abuse (HCC)    • Vitamin D deficiency        PAST SURGICAL HISTORY:  Past Surgical History:   Procedure Laterality Date   • CARDIAC CATHETERIZATION N/A 7/14/2020   •  CARDIAC CATHETERIZATION N/A 4/23/2021    Procedure: Left Heart Cath;  Surgeon: Melba Romo MD;  Location: [facility] CATH INVASIVE LOCATION;  Service: Cardiology;  Laterality: N/A;   • CARDIAC CATHETERIZATION N/A 4/30/2021    Procedure: Percutaneous Coronary Intervention;  Surgeon: Russell Voss MD;  Location: [facility] CATH INVASIVE LOCATION;  Service: Cardiovascular;  Laterality: N/A;   • CARDIAC CATHETERIZATION N/A 4/30/2021    Procedure: Stent NIKKI coronary;  Surgeon: Russell Voss MD;  Location: [facility] CATH INVASIVE LOCATION;  Service: Cardiovascular;  Laterality: N/A;   • CAROTID STENT Left    • COLONOSCOPY     • COLONOSCOPY N/A 5/14/2021    Procedure: COLONOSCOPY;  Surgeon: Mingo Duarte MD;  Location: [facility] ENDOSCOPY;  Service: Gastroenterology;  Laterality: N/A;   • CORONARY ARTERY BYPASS GRAFT N/A 2013    CABG X 3   • CYSTOSCOPY BLADDER STONE LITHOTRIPSY Bilateral    • ENDOSCOPY N/A 4/12/2021    Procedure: ESOPHAGOGASTRODUODENOSCOPY;  Surgeon: Mingo Duarte MD;  Location: [facility] ENDOSCOPY;  Service: Gastroenterology;  Laterality: N/A;   • ENDOSCOPY N/A 5/14/2021    Procedure: ESOPHAGOGASTRODUODENOSCOPY;  Surgeon: Mingo Duarte MD;  Location: [facility] ENDOSCOPY;  Service: Gastroenterology;  Laterality: N/A;   • INTERVENTIONAL RADIOLOGY PROCEDURE N/A 10/21/2021    Procedure: tunneled central venous catheter placement;  Surgeon: Donnie Robles MD;  Location: [facility] ANGIO INVASIVE LOCATION;  Service: Interventional Radiology;  Laterality: N/A;       FAMILY HISTORY:  Family History   Problem Relation Age of Onset   • Heart disease Mother    • Heart disease Father    • Heart disease Sister    • Heart disease Brother         SOCIAL HISTORY:  Social History     Socioeconomic History   • Marital status:      Spouse name: wesley dumont   Tobacco Use   • Smoking status: Former Smoker     Packs/day: 0.25     Years: 46.00     Pack years: 11.50     Types: Cigarettes   •  Smokeless tobacco: Never Used   • Tobacco comment: only smoking 5 a day - quit april 23 2021   Substance and Sexual Activity   • Alcohol use: No   • Drug use: Not Currently     Types: LSD, Marijuana, Methamphetamines   • Sexual activity: Not Currently       HOME MEDICATIONS:  Prior to Admission medications    Medication Sig Start Date End Date Taking? Authorizing Provider   acetaminophen (Tylenol 8 Hour) 650 MG 8 hr tablet Take 1 tablet by mouth Every 8 (Eight) Hours As Needed for Mild Pain . 11/5/21   Blake Burris MD   albuterol (PROVENTIL) (2.5 MG/3ML) 0.083% nebulizer solution Inhale the contents of 1 vial by nebulization Every 4 (Four) Hours As Needed for Wheezing. 10/28/21   Haily Mcneil MD   albuterol sulfate  (90 Base) MCG/ACT inhaler Inhale 2 puffs Every 4 (Four) Hours As Needed for Wheezing. 3/26/21   Nirmal Calvillo MD   aspirin (aspirin) 81 MG EC tablet Take 1 tablet by mouth Daily. 5/1/21   Jr Jaime Estrada MD   atorvastatin (LIPITOR) 20 MG tablet TAKE ONE TABLET BY MOUTH EVERY NIGHT AT BEDTIME 10/8/21   Blake Burris MD   Blood Glucose Monitoring Suppl ([employer] Blood Glucose Meter) w/Device kit 1 each 3 (Three) Times a Day. 10/9/20   Nirmal Calvillo MD   bumetanide (BUMEX) 2 MG tablet Take 1 tablet by mouth 4 (Four) Times a Week. 10/30/21   Haily Mcneil MD   cetirizine (zyrTEC) 10 MG tablet Take 1 tablet by mouth Daily. 3/26/21   Nirmal Calvillo MD   clopidogrel (PLAVIX) 75 MG tablet Take 1 tablet by mouth Daily. 3/26/21   Nirmal Calvillo MD   Continuous Blood Gluc  (FreeStyle Jade 2 [place]) device 1 each Continuous. 3/31/21   Nirmal Calvillo MD   Continuous Blood Gluc Sensor (FreeStyle Jade 2 Sensor) misc 1 each Every 14 (Fourteen) Days. 3/31/21   Nirmal Calvillo MD   cyclobenzaprine (FLEXERIL) 10 MG tablet Take 1 tablet by mouth 2 (Two) Times a Day As Needed for Muscle Spasms. 3/26/21   Nirmal Calvillo MD    Dulaglutide (Trulicity) 0.75 MG/0.5ML solution pen-injector Inject 0.75 mg (1 pen) under the skin into the appropriate area as directed 1 (One) Time Per Week. 10/28/21   Haily Mcneil MD   DULoxetine (CYMBALTA) 20 MG capsule TAKE 1 CAPSULE BY MOUTH DAILY. 7/8/21   Nirmal Calvillo MD   EASY TOUCH PEN NEEDLES 31G X 8 MM misc  8/7/20   Caio Thompson MD   ferrous sulfate (FeroSul) 325 (65 FE) MG tablet Take 1 tablet by mouth Daily With Breakfast. 3/26/21   Nirmal Calvillo MD   gabapentin (NEURONTIN) 400 MG capsule Take 1 capsule by mouth 3 (Three) Times a Day. 10/28/21   Radha Argueta MD   glucose blood test strip Use as instructed 10/9/20   Nirmal Calvillo MD   insulin aspart (novoLOG FLEXPEN) 100 UNIT/ML solution pen-injector sc pen Inject 30 Units under the skin into the appropriate area as directed 3 (Three) Times a Day With Meals. 10/28/21   Haily Mcneil MD   insulin degludec (Tresiba FlexTouch) 100 UNIT/ML solution pen-injector injection Inject 50 Units under the skin into the appropriate area as directed Every Night. 10/28/21   Haily Mcneil MD   Insulin Pen Needle (NovoFine) 30G X 8 MM misc As directed 4 times daily 3/26/21   Nirmal Calvillo MD   Insulin Pen Needle 31G X 8 MM misc Use to inject insulin 4 (Four) Times a Day as directed. 10/28/21   Haily Mcneil MD   ipratropium-albuterol (DUO-NEB) 0.5-2.5 mg/3 ml nebulizer Take 3 mL by nebulization 4 (Four) Times a Day. 3/22/17   Caio Thompson MD   isosorbide mononitrate (IMDUR) 60 MG 24 hr tablet Take 1 tablet by mouth Every Morning.  Patient taking differently: Take 0.5 tablets by mouth Every Morning. TAKING 30 MG  8/16/21   Margarito Davis MD   levothyroxine (SYNTHROID, LEVOTHROID) 25 MCG tablet Take 25 mcg by mouth Daily. 9/1/21   Yadira Delarosa MD   lidocaine (LIDODERM) 5 % APPLY TO THE AFFECTED AREA(S) TOPICALLY TWO TIMES A DAY. REMOVE NIGHTLY 10/8/21   Blake Burris MD    lisinopril (PRINIVIL,ZESTRIL) 10 MG tablet Take 1 tablet by mouth Daily. 12/23/20   Nirmal Calvillo MD   metoprolol tartrate (LOPRESSOR) 50 MG tablet TAKE ONE TABLET BY MOUTH TWO TIMES A DAY. HOLD IF PULSE IS LESS THAN 60 10/8/21   Blake Burris MD   montelukast (SINGULAIR) 10 MG tablet Take 1 tablet by mouth Every Night. 3/26/21   Nirmal Calvillo MD   nitroglycerin (NITROSTAT) 0.4 MG SL tablet Place 0.4 mg under the tongue Every 5 (Five) Minutes As Needed for Chest Pain (x 3 doses). 1/1/15   Caio Thmopson MD   nystatin (MYCOSTATIN) 816040 UNIT/GM powder Apply  topically to the appropriate area as directed 3 (Three) Times a Day. 10/1/21   Carline Coffey MD   ondansetron ODT (Zofran ODT) 4 MG disintegrating tablet Place 1 tablet on the tongue Every 8 (Eight) Hours As Needed for Nausea or Vomiting. 3/26/21   Nirmal Calvillo MD   oxybutynin XL (DITROPAN-XL) 5 MG 24 hr tablet Take 1 tablet by mouth Daily. 3/26/21   Nirmal Calvillo MD   pantoprazole (PROTONIX) 40 MG EC tablet Take 1 tablet by mouth Every Night. 3/26/21   Nirmal Calvillo MD   promethazine (PHENERGAN) 25 MG tablet Take 25 mg by mouth Every 6 (Six) Hours As Needed. 7/13/21   ProviderCaio MD   sennosides-docusate (PERICOLACE) 8.6-50 MG per tablet Take 2 tablets by mouth 2 (Two) Times a Day. 10/28/21   Haily Mcneil MD   sodium bicarbonate 650 MG tablet Take 1 tablet by mouth 2 (Two) Times a Day.  Patient taking differently: Take 2 tablets by mouth 2 (Two) Times a Day. 4/12/21   Umesh Giraldo III, MD       ALLERGIES:  Adhesive tape and Other    REVIEW OF SYSTEMS  Review of Systems   Constitutional: Negative for chills and fever.   HENT: Negative for facial swelling and trouble swallowing.    Eyes: Negative for photophobia and visual disturbance.   Respiratory: Positive for shortness of breath. Negative for apnea and chest tightness.    Cardiovascular: Negative for chest pain.    Gastrointestinal: Positive for diarrhea. Negative for abdominal distention, nausea and vomiting.   Genitourinary: Negative for pelvic pain.   Musculoskeletal: Negative for arthralgias and myalgias.   Neurological: Negative for seizures and speech difficulty.   Psychiatric/Behavioral: Negative for confusion and hallucinations.       PHYSICAL EXAM:  Temp:  [96.5 °F (35.8 °C)-98 °F (36.7 °C)] 96.5 °F (35.8 °C)  Heart Rate:  [67-72] 67  Resp:  [18-20] 20  BP: (152-172)/(69-74) 152/69  Body mass index is 53.01 kg/m².  Physical Exam  Constitutional:       Appearance: She is well-developed. She is obese.   HENT:      Nose: Nose normal.   Eyes:      Conjunctiva/sclera: Conjunctivae normal.      Pupils: Pupils are equal, round, and reactive to light.   Cardiovascular:      Rate and Rhythm: Normal rate and regular rhythm.      Pulses: Normal pulses.      Heart sounds: Normal heart sounds.   Pulmonary:      Effort: Pulmonary effort is normal. No respiratory distress.      Breath sounds: Normal breath sounds.   Abdominal:      General: Abdomen is flat. Bowel sounds are normal. There is no distension.      Palpations: Abdomen is soft.   Musculoskeletal:         General: Normal range of motion.      Cervical back: Normal range of motion and neck supple.      Right lower leg: Edema present.      Left lower leg: Edema present.   Skin:     General: Skin is warm and dry.   Neurological:      General: No focal deficit present.      Mental Status: She is alert and oriented to person, place, and time. Mental status is at baseline.      Cranial Nerves: No cranial nerve deficit.   Psychiatric:         Mood and Affect: Mood normal.         Behavior: Behavior normal.         DIAGNOSTIC DATA:   Lab Results (last 24 hours)     Procedure Component Value Units Date/Time    Troponin [[id number]]  (Normal) Collected: 11/08/21 1724    Specimen: Blood Updated: 11/08/21 1747     Troponin T 0.024 ng/mL     Narrative:      Troponin T Reference  Range:  <= 0.03 ng/mL-   Negative for AMI  >0.03 ng/mL-     Abnormal for myocardial necrosis.  Clinicians would have to utilize clinical acumen, EKG, Troponin and serial changes to determine if it is an Acute Myocardial Infarction or myocardial injury due to an underlying chronic condition.       Results may be falsely decreased if patient taking Biotin.      POC Glucose Once [[id number]]  (Abnormal) Collected: 11/08/21 1625    Specimen: Blood Updated: 11/08/21 1651     Glucose 392 mg/dL      Comment: RN NotifiedOperator: [id number] ANGEL Reyes ID: [id number]       Troponin [[id number]]  (Normal) Collected: 11/08/21 1631    Specimen: Blood Updated: 11/08/21 1649     Troponin T 0.026 ng/mL     Narrative:      Troponin T Reference Range:  <= 0.03 ng/mL-   Negative for AMI  >0.03 ng/mL-     Abnormal for myocardial necrosis.  Clinicians would have to utilize clinical acumen, EKG, Troponin and serial changes to determine if it is an Acute Myocardial Infarction or myocardial injury due to an underlying chronic condition.       Results may be falsely decreased if patient taking Biotin.      [place] Draw [[id number]] Collected: 11/08/21 1450    Specimen: Blood Updated: 11/08/21 1601    Narrative:      The following orders were created for panel order [place] Draw.  Procedure                               Abnormality         Status                     ---------                               -----------         ------                     Green Top (Gel)[[id number]]                                  Final result               Lavender Top[[id number]]                                     Final result               Gold Top - SST[[id number]]                                   Final result               Light Blue Top[[id number]]                                   Final result                 Please view results for these tests on the individual orders.    Gold Top - SST [[id number]] Collected: 11/08/21 1450    Specimen: Blood  Updated: 11/08/21 1601     Extra Tube Hold for add-ons.     Comment: Auto resulted.       Light Blue Top [[id number]] Collected: 11/08/21 1450    Specimen: Blood Updated: 11/08/21 1601     Extra Tube hold for add-on     Comment: Auto resulted       Green Top (Gel) [[id number]] Collected: 11/08/21 1450    Specimen: Blood Updated: 11/08/21 1601     Extra Tube Hold for add-ons.     Comment: Auto resulted.       Lavender Top [[id number]] Collected: 11/08/21 1450    Specimen: Blood Updated: 11/08/21 1601     Extra Tube hold for add-on     Comment: Auto resulted       COVID-19 and FLU A/B PCR - Swab, Nasopharynx [[id number]]  (Normal) Collected: 11/08/21 1453    Specimen: Swab from Nasopharynx Updated: 11/08/21 1552     COVID19 Not Detected     Influenza A PCR Not Detected     Influenza B PCR Not Detected    Narrative:      Fact sheet for providers: https://www.fda.gov/media/669650/download    Fact sheet for patients: https://www.fda.gov/media/812077/download    Test performed by PCR.    Comprehensive Metabolic Panel [[id number]]  (Abnormal) Collected: 11/08/21 1450    Specimen: Blood Updated: 11/08/21 1519     Glucose 441 mg/dL      BUN 48 mg/dL      Creatinine 3.27 mg/dL      Sodium 131 mmol/L      Potassium 4.1 mmol/L      Chloride 93 mmol/L      CO2 25.0 mmol/L      Calcium 8.7 mg/dL      Total Protein 7.1 g/dL      Albumin 3.30 g/dL      ALT (SGPT) 8 U/L      AST (SGOT) 11 U/L      Alkaline Phosphatase 134 U/L      Total Bilirubin 0.2 mg/dL      eGFR Non African Amer 14 mL/min/1.73      Comment: <15 Indicative of kidney failure.        eGFR   Amer --     Comment: <15 Indicative of kidney failure.        Globulin 3.8 gm/dL      A/G Ratio 0.9 g/dL      BUN/Creatinine Ratio 14.7     Anion Gap 13.0 mmol/L     Narrative:      GFR Normal >60  Chronic Kidney Disease <60  Kidney Failure <15      Troponin [[id number]]  (Normal) Collected: 11/08/21 1450    Specimen: Blood Updated: 11/08/21 1516     Troponin T 0.025 ng/mL      Narrative:      Troponin T Reference Range:  <= 0.03 ng/mL-   Negative for AMI  >0.03 ng/mL-     Abnormal for myocardial necrosis.  Clinicians would have to utilize clinical acumen, EKG, Troponin and serial changes to determine if it is an Acute Myocardial Infarction or myocardial injury due to an underlying chronic condition.       Results may be falsely decreased if patient taking Biotin.      BNP [[id number]]  (Abnormal) Collected: 11/08/21 1450    Specimen: Blood Updated: 11/08/21 1514     proBNP 1,386.0 pg/mL     Narrative:      Among patients with dyspnea, NT-proBNP is highly sensitive for the detection of acute congestive heart failure. In addition NT-proBNP of <300 pg/ml effectively rules out acute congestive heart failure with 99% negative predictive value.    Results may be falsely decreased if patient taking Biotin.      CBC & Differential [[id number]]  (Abnormal) Collected: 11/08/21 1450    Specimen: Blood Updated: 11/08/21 1502    Narrative:      The following orders were created for panel order CBC & Differential.  Procedure                               Abnormality         Status                     ---------                               -----------         ------                     CBC Auto Differential[[id number]]        Abnormal            Final result                 Please view results for these tests on the individual orders.    CBC Auto Differential [[id number]]  (Abnormal) Collected: 11/08/21 1450    Specimen: Blood Updated: 11/08/21 1502     WBC 8.14 10*3/mm3      RBC 2.90 10*6/mm3      Hemoglobin 7.9 g/dL      Hematocrit 24.7 %      MCV 85.2 fL      MCH 27.2 pg      MCHC 32.0 g/dL      RDW 14.8 %      RDW-SD 45.1 fl      MPV 8.7 fL      Platelets 252 10*3/mm3      Neutrophil % 61.6 %      Lymphocyte % 26.3 %      Monocyte % 6.9 %      Eosinophil % 3.6 %      Basophil % 0.7 %      Immature Grans % 0.9 %      Neutrophils, Absolute 5.02 10*3/mm3      Lymphocytes, Absolute 2.14 10*3/mm3       Monocytes, Absolute 0.56 10*3/mm3      Eosinophils, Absolute 0.29 10*3/mm3      Basophils, Absolute 0.06 10*3/mm3      Immature Grans, Absolute 0.07 10*3/mm3      nRBC 0.0 /100 WBC            Imaging Results (Last 24 Hours)     Procedure Component Value Units Date/Time    XR Chest 1 View [[id number]] Collected: 11/08/21 1450     Updated: 11/08/21 1533    Narrative:      Chest x-ray single view.         CLINICAL INDICATION: Shortness of breath and right-sided chest  pain.    COMPARISON: Chest October 21, 2021    FINDINGS: Cardiac silhouette is enlarged in size. Pulmonary  vascularity is unremarkable.   Midline sternal sutures from prior cardiac surgery. Dual lumen  tunneled central venous catheter, right jugular approach with  catheter tip in superior vena cava in satisfactory position.    Interval development of right lower lobe infiltrative changes  compatible with pneumonitis.      Impression:      Interval development of right lower lobe infiltrative  changes indicating pneumonitis.    Electronically signed by:  Hugo Russo MD  11/8/2021 3:32 PM CST  Workstation: [id number]            I reviewed the patient's new clinical results.    ASSESSMENT AND PLAN: This is a 61 y.o. female with:    Active Hospital Problems    Diagnosis  POA   • **Chest pain [R07.9]  Yes     Priority: High     -S/P CABG x 3 (Primary), Bilateral carotid artery stenosis w/ 2 stents on left side,  PAD (peripheral artery disease) with stent in right upper leg, Morbid obesity   -Continue aspirin, Plavix, atorvastatin, lisinopril,  isosorbide mononitrate 60 mg daily     -EKG: No ST segment changes.   -Initial troponin negative. Will continue to trend  -NPO after midnight  -Stress test in the AM       • Anemia [D64.9]  Unknown     Priority: High     -Anemia of chronic disease vs GI bleed  -No active bleeding on recent EGD/Colonoscopy  -Recent capsule endoscopy which showed few small nonbleeding intestinal AVMs and single small intestinal polyp which  GI planned to follow outpatient  -Drop in Hgb to 7.9 today from recent admission  -Continue to monitor H/H  -Will transfuse if Hgb <8  -Consider GI consult in the AM  -Continue home ferrous sulfate. May be a candidate for epogen and IV iron  -Follow stool occult test     • CKD (chronic kidney disease), symptom management only, stage 5 ([facility]) [N18.5]  Yes     Priority: High     Results from last 7 days   Lab Units 11/08/21  1450   CREATININE mg/dL 3.27*     -Completes outpatient HD on TTS  -Nephrology on board. Will require HD in house     • Type 2 diabetes mellitus with hyperglycemia ([facility]) [E11.65]  Unknown     -Continue 20 units of levemir in the morning , 30 units of novolog with meals, and 50 units of levemir at night  -[facility] for additional coverage     • Pneumonitis [J18.9]  Unknown   • Acute on chronic congestive heart failure ([facility]) [I50.9]  Yes     - Continue Imdur, metoprolol, lisinopril      • COPD (chronic obstructive pulmonary disease) ([facility]) [J44.9]  Yes     -O2 supplementation  -Home inhalers as needed  -DuoNebs  -CPAP at night          DVT prophylaxis: Heparin     Yamileth Slater and I have discussed pain goals for this hospitalization after reviewing her current clinical condition, medical history and prior pain experiences.  The goal is to keep the pain level <2.  To help achieve this, I plan to use prn tylenol.    JULIAN reviewed via PDMP and consistent with patient reported medications.    Expected Length of Stay: Home in 1-2 days    I discussed the patient's findings and my recommendations with patient.     Kit Brar MD is the attending on record at time of admission, He is aware of the patient's status and agrees with the above history and physical.          PGY-3  [facility]  This document has been electronically signed by Carline Coffey MD on November 8, 2021 18:04 CST          Electronically signed by Kit Brar MD at 11/10/21 4379           Emergency Department Notes      Ayala Sepulveda, RN at 11/08/21 1431        EMS- Substernal chest pain came on suddenly 30 minutes ago. 81mg ASA and 0.4 sublingual nitro PTA    FSBG 490- stopped metformin when began dialysis x 2 weeks    Pt is oxygen dependent 3L NC continuous-COPD     Ayala Sepulveda, KENTRELL  11/08/21 1433      Electronically signed by Ayala Sepulveda RN at 11/08/21 1433     Abel Bryan MD at 11/08/21 1443          Subjective   Patient presents emergency department complaint of chest pain.  Patient notes a 1 day history of the same starting last evening.  Patient states that this is nonexertional.  Patient does not do much in terms of exertion.  Patient is a diabetic patient with known coronary disease status post bypass and stents placement.  Patient with morbid obesity.  Prior MI.  Patient recently started on dialysis for chronic kidney disease.  Patient is on Tuesday Thursday Friday with Dr. Lauren.  Patient is in between cardiologist that time, her former cardiologist was Dr. Barragan.  Patient notes that the chest pain started radiating to the left arm today which was of concern to her.  Patient presents for further evaluation.  Patient notes no associated diaphoresis or shortness of breath.  Patient continues to make some urine, urinating approximately 5 times a day.          Review of Systems   Constitutional: Negative.  Negative for appetite change, chills and fever.   HENT: Negative.  Negative for congestion.    Eyes: Negative.  Negative for photophobia and visual disturbance.   Respiratory: Positive for chest tightness. Negative for cough and shortness of breath.    Cardiovascular: Positive for chest pain. Negative for palpitations.   Gastrointestinal: Negative.  Negative for abdominal pain, constipation, diarrhea, nausea and vomiting.   Endocrine: Negative.    Genitourinary: Negative.  Negative for decreased urine volume, dysuria, flank pain and hematuria.    Musculoskeletal: Negative.  Negative for arthralgias, back pain, myalgias, neck pain and neck stiffness.   Skin: Negative.  Negative for pallor.   Neurological: Negative.  Negative for dizziness, syncope, weakness, light-headedness, numbness and headaches.   Psychiatric/Behavioral: Negative.  Negative for confusion and suicidal ideas. The patient is not nervous/anxious.    All other systems reviewed and are negative.      Past Medical History:   Diagnosis Date   • Acute blood loss anemia 4/16/2017    Likely due to gastric oozing at this time. - Dr. Duarte (GI) was consulted and has now signed off, will follow up outpatient - pill colonoscopy showed AVMs - continue to monitor   • Anxiety    • Carotid artery stenosis    • Chronic obstructive lung disease (HCC)    • CKD (chronic kidney disease) stage 4, GFR 15-29 ml/min ([facility])    • Colonic polyp    • Coronary arteriosclerosis    • Diabetes mellitus (HCC)    • Diabetic neuropathy ([facility])    • Ear pain, right 10/18/2021    - canal trauma due to patient scratching and DMT2 - added cortisporin ear drops   • Elevated troponin 10/12/2021    -most likely from CKD -Trending down -Neg chest pain   • GERD (gastroesophageal reflux disease)    • GI bleed 5/13/2021    - GI will follow up outpatient - Protonix 40mg daily - Avoid medical DVT prophy and use mechanical at this time instead. - Continue to monitor - pill colonoscopy results showed AVMs   • History of transfusion    • Hypercholesterolemia    • Hypertension    • Hypomagnesemia 6/27/2021    Monitor and replace   • Morbid obesity ([facility])    • Nephrolithiasis    • Peripheral vascular disease ([facility])    • Sleep apnea    • Substance abuse ([facility])    • Vitamin D deficiency        Allergies   Allergen Reactions   • Adhesive Tape Hives   • Other      Bandaids, MRSA, SURECLOSE       Past Surgical History:   Procedure Laterality Date   • CARDIAC CATHETERIZATION N/A 7/14/2020   • CARDIAC CATHETERIZATION N/A 4/23/2021    Procedure: Left  Heart Cath;  Surgeon: Melba Romo MD;  Location: [facility] CATH INVASIVE LOCATION;  Service: Cardiology;  Laterality: N/A;   • CARDIAC CATHETERIZATION N/A 4/30/2021    Procedure: Percutaneous Coronary Intervention;  Surgeon: Russell Voss MD;  Location: [facility] CATH INVASIVE LOCATION;  Service: Cardiovascular;  Laterality: N/A;   • CARDIAC CATHETERIZATION N/A 4/30/2021    Procedure: Stent NIKKI coronary;  Surgeon: Russell Voss MD;  Location: [facility] CATH INVASIVE LOCATION;  Service: Cardiovascular;  Laterality: N/A;   • CAROTID STENT Left    • COLONOSCOPY     • COLONOSCOPY N/A 5/14/2021    Procedure: COLONOSCOPY;  Surgeon: Mingo Duarte MD;  Location: [facility] ENDOSCOPY;  Service: Gastroenterology;  Laterality: N/A;   • CORONARY ARTERY BYPASS GRAFT N/A 2013    CABG X 3   • CYSTOSCOPY BLADDER STONE LITHOTRIPSY Bilateral    • ENDOSCOPY N/A 4/12/2021    Procedure: ESOPHAGOGASTRODUODENOSCOPY;  Surgeon: Mingo Duarte MD;  Location: [facility] ENDOSCOPY;  Service: Gastroenterology;  Laterality: N/A;   • ENDOSCOPY N/A 5/14/2021    Procedure: ESOPHAGOGASTRODUODENOSCOPY;  Surgeon: Mingo Duarte MD;  Location: [facility] ENDOSCOPY;  Service: Gastroenterology;  Laterality: N/A;   • INTERVENTIONAL RADIOLOGY PROCEDURE N/A 10/21/2021    Procedure: tunneled central venous catheter placement;  Surgeon: Donnie Robles MD;  Location: [facility] ANGIO INVASIVE LOCATION;  Service: Interventional Radiology;  Laterality: N/A;       Family History   Problem Relation Age of Onset   • Heart disease Mother    • Heart disease Father    • Heart disease Sister    • Heart disease Brother        Social History     Socioeconomic History   • Marital status:      Spouse name: wesley dumont   Tobacco Use   • Smoking status: Former Smoker     Packs/day: 0.25     Years: 46.00     Pack years: 11.50     Types: Cigarettes   • Smokeless tobacco: Never Used   • Tobacco comment: only smoking 5 a day - quit april 23 2021    Substance and Sexual Activity   • Alcohol use: No   • Drug use: Not Currently     Types: LSD, Marijuana, Methamphetamines   • Sexual activity: Not Currently           Objective   Physical Exam  Vitals and nursing note reviewed.   Constitutional:       General: She is not in acute distress.     Appearance: She is well-developed. She is obese. She is not diaphoretic.      Comments: Deconditioned appearing female in no acute distress at this time.   HENT:      Head: Normocephalic and atraumatic.      Nose: Nose normal.   Eyes:      General: No scleral icterus.     Extraocular Movements: Extraocular movements intact.      Conjunctiva/sclera: Conjunctivae normal.   Neck:      Vascular: No JVD.   Cardiovascular:      Rate and Rhythm: Normal rate and regular rhythm.      Heart sounds: Normal heart sounds. No murmur heard.  No friction rub. No gallop.    Pulmonary:      Effort: Pulmonary effort is normal. No respiratory distress.      Breath sounds: No wheezing or rales.   Chest:      Chest wall: No tenderness.   Abdominal:      General: There is no distension.      Palpations: Abdomen is soft. There is no mass.      Tenderness: There is no abdominal tenderness. There is no guarding or rebound.   Musculoskeletal:         General: No tenderness or deformity. Normal range of motion.      Cervical back: Normal range of motion and neck supple.      Right lower leg: Edema present.      Left lower leg: Edema present.      Comments: 1+ pitting edema bilateral lower extremities left greater than right.  Left the area of the patient's prior CABG harvesting.   Lymphadenopathy:      Cervical: No cervical adenopathy.   Skin:     General: Skin is warm and dry.      Capillary Refill: Capillary refill takes less than 2 seconds.      Coloration: Skin is not pale.      Findings: No erythema or rash.   Neurological:      General: No focal deficit present.      Mental Status: She is alert and oriented to person, place, and time.       Cranial Nerves: No cranial nerve deficit.      Motor: No abnormal muscle tone.   Psychiatric:         Behavior: Behavior normal.         Thought Content: Thought content normal.         Judgment: Judgment normal.         Procedures          ED Course  ED Course as of 11/08/21 1541   Mon Nov 08, 2021   1446 Initial EKG with right bundle branch block and prior infarct pattern with Q waves in lead III.  No ST elevation is noted.  No significant changes in the lateral leads.  Possible increase QRS complexes in the precordial leads from comparison in October 2021. [PC]   1448 Chest pain, negative troponin, heart score greater than 3, plan observation for possible acute coronary syndrome.  Patient be admitted for the same. [PC]      ED Course User Index  [PC] Abel Bryan MD                                 Labs Reviewed   COMPREHENSIVE METABOLIC PANEL - Abnormal; Notable for the following components:       Result Value    Glucose 441 (*)     BUN 48 (*)     Creatinine 3.27 (*)     Sodium 131 (*)     Chloride 93 (*)     Albumin 3.30 (*)     Alkaline Phosphatase 134 (*)     eGFR Non  Amer 14 (*)     All other components within normal limits    Narrative:     GFR Normal >60  Chronic Kidney Disease <60  Kidney Failure <15     BNP (IN-HOUSE) - Abnormal; Notable for the following components:    proBNP 1,386.0 (*)     All other components within normal limits    Narrative:     Among patients with dyspnea, NT-proBNP is highly sensitive for the detection of acute congestive heart failure. In addition NT-proBNP of <300 pg/ml effectively rules out acute congestive heart failure with 99% negative predictive value.    Results may be falsely decreased if patient taking Biotin.     CBC WITH AUTO DIFFERENTIAL - Abnormal; Notable for the following components:    RBC 2.90 (*)     Hemoglobin 7.9 (*)     Hematocrit 24.7 (*)     Immature Grans % 0.9 (*)     Immature Grans, Absolute 0.07 (*)     All other components within normal  limits   TROPONIN (IN-HOUSE) - Normal    Narrative:     Troponin T Reference Range:  <= 0.03 ng/mL-   Negative for AMI  >0.03 ng/mL-     Abnormal for myocardial necrosis.  Clinicians would have to utilize clinical acumen, EKG, Troponin and serial changes to determine if it is an Acute Myocardial Infarction or myocardial injury due to an underlying chronic condition.       Results may be falsely decreased if patient taking Biotin.     COVID-19 AND FLU A/B, NP SWAB IN TRANSPORT MEDIA 8-12 HR TAT   RAINBOW DRAW    Narrative:     The following orders were created for panel order [facility] Draw.  Procedure                               Abnormality         Status                     ---------                               -----------         ------                     Green Top (Gel)[[id number]]                                  In process                 Lavender Top[[id number]]                                     In process                 Gold Top - SST[[id number]]                                   In process                 Light Blue Top[[id number]]                                   In process                   Please view results for these tests on the individual orders.   TROPONIN (IN-HOUSE)   CBC AND DIFFERENTIAL    Narrative:     The following orders were created for panel order CBC & Differential.  Procedure                               Abnormality         Status                     ---------                               -----------         ------                     CBC Auto Differential[[id number]]        Abnormal            Final result                 Please view results for these tests on the individual orders.   GREEN TOP   LAVENDER TOP   GOLD TOP - SST   LIGHT BLUE TOP       XR Chest 1 View   Final Result   Interval development of right lower lobe infiltrative   changes indicating pneumonitis.      Electronically signed by:  Hugo Russo MD  11/8/2021 3:32 PM [facility]   Workstation: [id number]                 MDM    Final diagnoses:   Chest pain, unspecified type   Hyperglycemia       ED Disposition  ED Disposition     ED Disposition Condition Comment    Decision to Admit  Level of Care: Telemetry [5]   Diagnosis: Chest pain, unspecified type [[id number]]   Admitting Physician: TARIQ CONDON [[id number]]   Attending Physician: TARIQ CONDON [[id number]]            No follow-up provider specified.       Medication List      No changes were made to your prescriptions during this visit.          Abel Bryan MD  21 1541      Electronically signed by Abel Bryan MD at 21 1541     Ayala Sepulveda, RN at 21 1622        Report called to KENTRELL Chu Shaine, RN  21 162      Electronically signed by Ayala Sepulveda RN at 21 1622     Bouchra Duff RN at 21 1708        Applied telemetry box #[id number] and called to verify telemetry communication.      Bouchra Duff RN  21 1712      Electronically signed by Bouchra Duff RN at 21 1712          Physician Progress Notes (last 72 hours)      Alvarado Mauricio MD at 11/15/21 0612              FAMILY MEDICINE DAILY PROGRESS NOTE    NAME: Yamileth Slater  : 1960  MRN: [id number]      LOS: 3 days     PROVIDER OF SERVICE: Alvarado Mauricio MD    Chief Complaint: Coronary artery disease    Subjective:     Interval History: History taken from: patient    Afebrile. Vital signs stable.  Satting 95% on 3 L nasal cannula.    Patient status post 2 days from left heart catheterization with percutaneous intervention.  Also status post 2 days from hemodialysis.  Next hemodialysis session scheduled for tomorrow.    Patient is also status post 2 units of packed red blood cell transfusion yesterday.  Hemoglobin improved from 7-8.6.     Patient has no complaints today. She is no longer experiencing chest pain.    We will be following up with cardiology as well as endocrinology, which evaluated the patient during her  stay.  We will also be working with case management for discharge placement now that her chest pain has resolved as this lady came from a rehabilitation facility.  Anticipate discharge soon.      Review of Systems:   Review of Systems   Constitutional: Negative for chills, fatigue and fever.   Respiratory: Negative for shortness of breath and wheezing.    Cardiovascular: Negative for chest pain, palpitations and leg swelling.   Gastrointestinal: Negative for abdominal pain, nausea and vomiting.   Genitourinary: Negative for difficulty urinating and dysuria.   Neurological: Negative for dizziness, weakness and light-headedness.       Objective:     Vital Signs  Temp:  [97 °F (36.1 °C)-98.1 °F (36.7 °C)] 97 °F (36.1 °C)  Heart Rate:  [59-78] 72  Resp:  [16-20] 18  BP: (111-181)/(53-77) 156/70  Body mass index is 54.6 kg/m².    Physical Exam  Physical Exam  Constitutional:       General: She is not in acute distress.     Appearance: She is obese. She is not toxic-appearing or diaphoretic.      Comments: Nasal cannula in place   Cardiovascular:      Rate and Rhythm: Normal rate and regular rhythm.   Pulmonary:      Effort: Pulmonary effort is normal.      Breath sounds: Normal breath sounds.   Abdominal:      Tenderness: There is no abdominal tenderness. There is no guarding.   Musculoskeletal:      Right lower leg: No edema.      Left lower leg: No edema.         Medication Review    Current Facility-Administered Medications:   •  acetaminophen (TYLENOL) tablet 650 mg, 650 mg, Oral, Q8H PRN, Alvarado Mauricio MD, 650 mg at 11/14/21 1146  •  albuterol (PROVENTIL) nebulizer solution 0.083% 2.5 mg/3mL, 2.5 mg, Nebulization, Q4H PRN, Alvarado Mauricio MD  •  aspirin EC tablet 81 mg, 81 mg, Oral, Daily, Alvarado Mauricio MD, 81 mg at 11/14/21 0859  •  atorvastatin (LIPITOR) tablet 20 mg, 20 mg, Oral, Daily, Alvarado Mauricio MD, 20 mg at 11/14/21 0859  •  bumetanide (BUMEX) tablet 2 mg, 2 mg, Oral, Once per day on Sun Mon Wed  Fri, Alvarado Mauricio MD, 2 mg at 11/14/21 0859  •  cetirizine (zyrTEC) tablet 10 mg, 10 mg, Oral, Daily, Alvarado Mauricio MD, 10 mg at 11/14/21 0859  •  clopidogrel (PLAVIX) tablet 75 mg, 75 mg, Oral, Daily, Alvarado Mauricio MD, 75 mg at 11/14/21 0859  •  cyclobenzaprine (FLEXERIL) tablet 10 mg, 10 mg, Oral, BID PRN, Alvarado Mauricio MD  •  dextrose (D50W) (25 g/50 mL) IV injection 25 g, 25 g, Intravenous, Q15 Min PRN, Alvarado Mauricio MD  •  dextrose (GLUTOSE) oral gel 15 g, 15 g, Oral, Q15 Min PRN, Alvarado Mauricio MD  •  DULoxetine (CYMBALTA) DR capsule 20 mg, 20 mg, Oral, Daily, Alvarado Mauricio MD, 20 mg at 11/14/21 0858  •  [START ON 11/16/2021] epoetin hubert-epbx (RETACRIT) injection 10,000 Units, 10,000 Units, Intravenous, Once per day on Tue Thu Sat, Alvarado Mauricio MD  •  ferrous sulfate EC tablet 324 mg, 324 mg, Oral, Daily With Breakfast, Alvarado Mauricio MD, 324 mg at 11/14/21 0858  •  fluticasone (FLONASE) 50 MCG/ACT nasal spray 2 spray, 2 spray, Each Nare, Daily, Alvarado Mauricio MD, 2 spray at 11/11/21 0802  •  gabapentin (NEURONTIN) capsule 400 mg, 400 mg, Oral, TID, Alvarado Mauricio MD, 400 mg at 11/14/21 2104  •  glucagon (human recombinant) (GLUCAGEN DIAGNOSTIC) injection 1 mg, 1 mg, Subcutaneous, Q15 Min PRN, Alvarado Mauricio MD  •  heparin (porcine) 5000 UNIT/ML injection 5,000 Units, 5,000 Units, Subcutaneous, Q12H, Alvarado Mauricio MD, 5,000 Units at 11/14/21 2104  •  heparin (porcine) injection 2,000 Units, 2,000 Units, Intracatheter, PRN, Alvarado Mauricio MD, 2,000 Units at 11/13/21 1757  •  heparin (porcine) injection 2,000 Units, 2,000 Units, Intracatheter, PRN, Alvarado Mauricio MD, 2,000 Units at 11/13/21 1755  •  influenza vac split quad (FLUZONE,FLUARIX,AFLURIA,FLULAVAL) injection 0.5 mL, 0.5 mL, Intramuscular, During Hospitalization, Alvarado Mauricio MD  •  insulin aspart (novoLOG) injection 3-12 Units, 3-12 Units, Subcutaneous, 4x Daily AC & at Bedtime, Alvarado Mauricio MD, 3 Units  at 11/14/21 2140  •  insulin aspart (novoLOG) injection 3-12 Units, 3-12 Units, Subcutaneous, Q24H, Alvarado Mauricio MD  •  insulin aspart (novoLOG) injection 5-20 Units, 5-20 Units, Subcutaneous, TID With Meals, Alvarado Mauricio MD, 15 Units at 11/14/21 1805  •  insulin detemir (LEVEMIR) injection 50 Units, 50 Units, Subcutaneous, Daily, Alvarado Mauricio MD, 50 Units at 11/14/21 0725  •  ipratropium-albuterol (DUO-NEB) nebulizer solution 3 mL, 3 mL, Nebulization, 4x Daily - RT, Alvarado Mauricio MD, 3 mL at 11/15/21 0709  •  isosorbide mononitrate (IMDUR) 24 hr tablet 30 mg, 30 mg, Oral, QAM, Alvarado Mauricio MD, 30 mg at 11/15/21 0553  •  levothyroxine (SYNTHROID, LEVOTHROID) tablet 25 mcg, 25 mcg, Oral, Daily, Alvarado Mauricio MD, 25 mcg at 11/14/21 0859  •  lidocaine (LIDODERM) 5 % 1 patch, 1 patch, Transdermal, Q24H, Alvarado Mauricio MD, 1 patch at 11/14/21 1027  •  lisinopril (PRINIVIL,ZESTRIL) tablet 5 mg, 5 mg, Oral, Daily, Alvarado Mauricio MD, 5 mg at 11/14/21 0900  •  melatonin tablet 1.5 mg, 1.5 mg, Oral, Nightly PRN, Alvarado Mauricio MD, 1.5 mg at 11/14/21 2104  •  metoprolol tartrate (LOPRESSOR) tablet 50 mg, 50 mg, Oral, Q12H, Alvarado Mauricio MD, 50 mg at 11/14/21 2104  •  montelukast (SINGULAIR) tablet 10 mg, 10 mg, Oral, Nightly, Alvarado Mauricio MD, 10 mg at 11/14/21 2104  •  morphine injection 4 mg, 4 mg, Intravenous, Q4H PRN, 4 mg at 11/14/21 1433 **AND** naloxone (NARCAN) injection 0.4 mg, 0.4 mg, Intravenous, Q5 Min PRN, Alvarado Mauricio MD  •  nitroglycerin (NITROSTAT) SL tablet 0.4 mg, 0.4 mg, Sublingual, Q5 Min PRN, Alvarado Mauricio MD  •  nystatin (MYCOSTATIN) powder, , Topical, TID, Alvarado Mauricio MD, Given at 11/14/21 2104  •  ondansetron (ZOFRAN) injection 4 mg, 4 mg, Intravenous, Q6H PRN, Alvarado Mauricio MD  •  ondansetron ODT (ZOFRAN-ODT) disintegrating tablet 4 mg, 4 mg, Oral, Q8H PRN, Alvarado Mauricio MD  •  oxybutynin XL (DITROPAN-XL) 24 hr tablet 5 mg, 5 mg, Oral, Daily, Luly  Alvarado ESPARZA MD, 5 mg at 11/14/21 0859  •  pantoprazole (PROTONIX) EC tablet 40 mg, 40 mg, Oral, Nightly, Alvarado Mauricio MD, 40 mg at 11/14/21 2104  •  promethazine (PHENERGAN) tablet 25 mg, 25 mg, Oral, Q6H PRN, Alvarado Mauricio MD  •  ranolazine (RANEXA) 12 hr tablet 500 mg, 500 mg, Oral, Q12H, Alvarado Mauricio MD, 500 mg at 11/14/21 2104  •  sennosides-docusate (PERICOLACE) 8.6-50 MG per tablet 2 tablet, 2 tablet, Oral, BID, Alvarado Mauricio MD, 2 tablet at 11/14/21 2104  •  sodium chloride 0.9 % flush 10 mL, 10 mL, Intravenous, PRN, Alvarado Mauricio MD  •  sodium chloride 0.9 % flush 10 mL, 10 mL, Intravenous, Q12H, Alvarado Mauricio MD, 10 mL at 11/14/21 0902  •  sodium chloride 0.9 % flush 10 mL, 10 mL, Intravenous, PRN, Alvarado Mauricio MD  •  sodium chloride 0.9 % infusion, 75 mL/hr, Intravenous, Continuous, Alvarado Mauricio MD, Last Rate: 75 mL/hr at 11/14/21 1454, 75 mL/hr at 11/14/21 1454     Diagnostic Data    Lab Results (last 24 hours)     Procedure Component Value Units Date/Time    Comprehensive Metabolic Panel [[id number]]  (Abnormal) Collected: 11/15/21 0533    Specimen: Blood Updated: 11/15/21 0654     Glucose 212 mg/dL      BUN 36 mg/dL      Creatinine 4.35 mg/dL      Sodium 127 mmol/L      Potassium 4.6 mmol/L      Chloride 91 mmol/L      CO2 22.0 mmol/L      Calcium 9.0 mg/dL      Total Protein 7.7 g/dL      Albumin 3.30 g/dL      ALT (SGPT) 7 U/L      AST (SGOT) 15 U/L      Alkaline Phosphatase 121 U/L      Total Bilirubin 0.2 mg/dL      eGFR Non African Amer 10 mL/min/1.73      Comment: <15 Indicative of kidney failure.        eGFR   Amer --     Comment: <15 Indicative of kidney failure.        Globulin 4.4 gm/dL      A/G Ratio 0.8 g/dL      BUN/Creatinine Ratio 8.3     Anion Gap 14.0 mmol/L     Narrative:      GFR Normal >60  Chronic Kidney Disease <60  Kidney Failure <15      Iron Profile [[id number]]  (Abnormal) Collected: 11/15/21 0533    Specimen: Blood Updated: 11/15/21 0650     Iron  36 mcg/dL      Iron Saturation 14 %      Transferrin 171 mg/dL      TIBC 255 mcg/dL     Ferritin [[id number]]  (Abnormal) Collected: 11/15/21 0533    Specimen: Blood Updated: 11/15/21 0648     Ferritin 152.60 ng/mL     Narrative:      Results may be falsely decreased if patient taking Biotin.      POC Glucose Once [[id number]]  (Abnormal) Collected: 11/15/21 0615    Specimen: Blood Updated: 11/15/21 0635     Glucose 277 mg/dL      Comment: RN NotifiedOperator: [id number] DARIEN JAREDTiffany ID: [id number]       CBC & Differential [[id number]]  (Abnormal) Collected: 11/15/21 0533    Specimen: Blood Updated: 11/15/21 0609    Narrative:      The following orders were created for panel order CBC & Differential.  Procedure                               Abnormality         Status                     ---------                               -----------         ------                     CBC Auto Differential[[id number]]        Abnormal            Final result                 Please view results for these tests on the individual orders.    CBC Auto Differential [[id number]]  (Abnormal) Collected: 11/15/21 0533    Specimen: Blood Updated: 11/15/21 0609     WBC 9.11 10*3/mm3      RBC 3.10 10*6/mm3      Hemoglobin 8.6 g/dL      Hematocrit 25.9 %      MCV 83.5 fL      MCH 27.7 pg      MCHC 33.2 g/dL      RDW 14.8 %      RDW-SD 44.6 fl      MPV 8.5 fL      Platelets 241 10*3/mm3      Neutrophil % 66.0 %      Lymphocyte % 20.2 %      Monocyte % 7.8 %      Eosinophil % 3.7 %      Basophil % 0.8 %      Immature Grans % 1.5 %      Neutrophils, Absolute 6.01 10*3/mm3      Lymphocytes, Absolute 1.84 10*3/mm3      Monocytes, Absolute 0.71 10*3/mm3      Eosinophils, Absolute 0.34 10*3/mm3      Basophils, Absolute 0.07 10*3/mm3      Immature Grans, Absolute 0.14 10*3/mm3      nRBC 0.0 /100 WBC     POC Glucose Once [[id number]]  (Abnormal) Collected: 11/14/21 1712    Specimen: Blood Updated: 11/14/21 1724     Glucose 135 mg/dL      Comment: RN  NotifiedOperator: [id number] SELINA FAYEISMeter ID: [id number]       POC Glucose Once [[id number]]  (Abnormal) Collected: 11/13/21 0633    Specimen: Blood Updated: 11/14/21 1649     Glucose 387 mg/dL      Comment: RN NotifiedOperator: [id number] PATRICIA POLLOCKMeter ID: [id number]       POC Glucose Once [[id number]]  (Abnormal) Collected: 11/14/21 1142    Specimen: Blood Updated: 11/14/21 1217     Glucose 254 mg/dL      Comment: RN NotifiedOperator: [id number] DARIEN ESPINOIKAMeter ID: [id number]       POC Glucose Once [[id number]]  (Abnormal) Collected: 11/14/21 0616    Specimen: Blood Updated: 11/14/21 1215     Glucose 151 mg/dL      Comment: RN NotifiedOperator: [id number] TRACE WIGGINSAHMeter ID: [id number]       Hemoglobin & Hematocrit, Blood [[id number]]  (Abnormal) Collected: 11/14/21 1011    Specimen: Blood Updated: 11/14/21 1044     Hemoglobin 7.0 g/dL      Hematocrit 22.0 %     POC Glucose Once [[id number]]  (Abnormal) Collected: 11/14/21 0917    Specimen: Blood Updated: 11/14/21 0944     Glucose 187 mg/dL      Comment: : [id number] FALLON LEHMANNEYMeter ID: [id number]       POC Glucose Once [[id number]]  (Abnormal) Collected: 11/14/21 0720    Specimen: Blood Updated: 11/14/21 0851     Glucose 158 mg/dL      Comment: : [id number] RAY DOMINIKNEYMeter ID: [id number]               I reviewed the patient's new clinical results.    Assessment/Plan:     Active Hospital Problems    Diagnosis  POA   • **Coronary artery disease [I25.10]  Yes     -S/P CABG x 3 (Primary), Bilateral carotid artery stenosis w/ 2 stents on left side,  PAD (peripheral artery disease) with stent in right upper leg, Morbid obesity   -Continue aspirin, Plavix, atorvastatin, lisinopril,  isosorbide mononitrate 60 mg daily     -EKG: No ST segment changes.   -Troponin negative  -Underwent heart catheterization, stents placed in left main circumflex and ostial circumflex artery. Patient also received thrombectomy of left main  coronary artery and selective cannulation of left internal mammary artery of saphenous vein graft to right coronary artery          • Anemia [D64.9]  Yes     -Anemia of chronic disease vs GI bleed  -No active bleeding on recent EGD/Colonoscopy  -Recent capsule endoscopy which showed few small nonbleeding intestinal AVMs and single small intestinal polyp which GI planned to follow outpatient  -Hematoma during heart cath  -Continue to monitor H/H  -Will transfuse if Hgb <7  -Continue home ferrous sulfate  -will start iron transfusions based on iron studies/ferritin   -Nephrology to start epogen with hemodialysis     • Type 2 diabetes mellitus with hyperglycemia (HCC) [E11.65]  Yes     Dr. Stephenson of Endocrinology consulted, initiated patient on insulin drip with plans to switch to basal, bolus insulin  Is considering initiating GLP 1 and/or sglt 2 prior to discharge          • Pneumonitis [J18.9]  Unknown   • Acute on chronic congestive heart failure (HCC) [I50.9]  Yes     - Continue Imdur, metoprolol, lisinopril      • CKD (chronic kidney disease), symptom management only, stage 5 ([facility]) [N18.5]  Yes     Results from last 7 days   Lab Units 11/14/21  0503 11/14/21  0041 11/13/21  1841   CREATININE mg/dL 2.77* 2.61* 1.95*     -Completes outpatient HD on TTS  -Nephrology on board. Will require HD in house     • COPD (chronic obstructive pulmonary disease) ([facility]) [J44.9]  Yes     -O2 supplementation  -Home inhalers as needed  -DuoNebs  -CPAP at night          DVT prophylaxis: Heparin  Code status is   Code Status and Medical Interventions:   Ordered at: 11/08/21 1734     Code Status (Patient has no pulse and is not breathing):    CPR (Attempt to Resuscitate)     Medical Interventions (Patient has pulse or is breathing):    Full Support       Plan for disposition:To place of residence in 1-2 days       Time: 15 min         This document has been electronically signed by Alvarado Mauricio MD on November 15, 2021 07:37  "CST    Part of the note may be an electronic transcription or translation of spoken language to printed text using the Dragon Dictation System       Electronically signed by Alvarado Mauricio MD at 11/15/21 0744     Ace Lauren MD at 21 0739          [facility]  [address] [place] [address]. [address]  T - [phone]  F - [phone]     Progress Note          PATIENT  DEMOGRAPHICS   PATIENT NAME: Yamileth Slater                      PHYSICIAN: Ace Lauren MD  : 1960  MRN: [id number]   LOS: 2 days    Patient Care Team:  Rianna Macias MD as PCP - General (Family Medicine)  Subjective   SUBJECTIVE   Events reviewed - transferred to icu post cath. Was on insulin drip. Stable at present         Objective   OBJECTIVE   Vital Signs  Temp:  [98 °F (36.7 °C)-98.6 °F (37 °C)] 98.3 °F (36.8 °C)  Heart Rate:  [58-89] 60  Resp:  [16-17] 17  BP: ()/(44-85) 124/56    Flowsheet Rows      First Filed Value   Admission Height 157.5 cm (62\") Documented at 2021 1434   Admission Weight 128 kg (282 lb) Documented at 2021 1434         I/O last 3 completed shifts:  In: 393 [I.V.:393]  Out: 3200 [Urine:2000; Other:1200]    PHYSICAL EXAM    Physical Exam  Vitals reviewed.   Constitutional:       Appearance: Normal appearance.   HENT:      Nose: Nose normal.   Cardiovascular:      Rate and Rhythm: Normal rate and regular rhythm.      Pulses: Normal pulses.      Heart sounds: Normal heart sounds. No murmur heard.      Pulmonary:      Effort: Pulmonary effort is normal.      Breath sounds: No wheezing or rales.   Abdominal:      General: Abdomen is flat. There is no distension.      Palpations: Abdomen is soft.      Tenderness: There is no abdominal tenderness.   Musculoskeletal:         General: No swelling.   Skin:     Coloration: Skin is not jaundiced.      Findings: No erythema.   Neurological:      General: No focal deficit present.      Mental Status: She is alert. " She is disoriented.         RESULTS   Results Review:    Results from last 7 days   Lab Units 11/14/21  0503 11/14/21  0041 11/13/21  1841 11/13/21  0536 11/13/21  0536 11/12/21  0531 11/12/21  0531   SODIUM mmol/L 135* 133* 133*   < > 127*   < > 127*   POTASSIUM mmol/L 4.3 4.0 4.0   < > 4.5   < > 4.9   CHLORIDE mmol/L 96* 96* 95*   < > 92*   < > 92*   CO2 mmol/L 26.0 28.0 26.0   < > 22.0   < > 20.0*   BUN mg/dL 26* 23 21   < > 52*   < > 44*   CREATININE mg/dL 2.77* 2.61* 1.95*   < > 4.06*   < > 3.49*   CALCIUM mg/dL 9.1 9.2 9.0   < > 9.1   < > 8.9   BILIRUBIN mg/dL 0.2  --   --   --  0.2  --  <0.2   ALK PHOS U/L 114  --   --   --  160*  --  145*   ALT (SGPT) U/L 8  --   --   --  9  --  8   AST (SGOT) U/L 17  --   --   --  10  --  11   GLUCOSE mg/dL 136* 92 214*   < > 363*   < > 391*    < > = values in this interval not displayed.       Estimated Creatinine Clearance: 27.3 mL/min (A) (by C-G formula based on SCr of 2.77 mg/dL (H)).                Results from last 7 days   Lab Units 11/14/21  0503 11/14/21  0041 11/13/21  1733 11/13/21  0536 11/12/21  0531 11/11/21  0600 11/11/21  0600 11/10/21  0538 11/10/21  0538   WBC 10*3/mm3 7.56  --   --  7.65 7.06  --  8.15  --  8.41   HEMOGLOBIN g/dL 7.3* 7.5* 8.0* 7.7* 8.4*   < > 8.2*   < > 8.5*   PLATELETS 10*3/mm3 312  --   --  273 265  --  267  --  288    < > = values in this interval not displayed.       Results from last 7 days   Lab Units 11/13/21  0536   INR  0.99         Imaging Results (Last 24 Hours)     ** No results found for the last 24 hours. **           MEDICATIONS    aspirin, 81 mg, Oral, Daily  atorvastatin, 20 mg, Oral, Daily  bumetanide, 2 mg, Oral, Once per day on Sun Mon Wed Fri  cetirizine, 10 mg, Oral, Daily  clopidogrel, 75 mg, Oral, Daily  DULoxetine, 20 mg, Oral, Daily  ferrous sulfate, 324 mg, Oral, Daily With Breakfast  fluticasone, 2 spray, Each Nare, Daily  gabapentin, 400 mg, Oral, TID  heparin (porcine), 5,000 Units, Subcutaneous,  Q12H  insulin aspart, 3-12 Units, Subcutaneous, 4x Daily AC & at Bedtime  [START ON 11/15/2021] insulin aspart, 3-12 Units, Subcutaneous, Q24H  insulin aspart, 5-20 Units, Subcutaneous, TID With Meals  insulin detemir, 50 Units, Subcutaneous, Daily  ipratropium-albuterol, 3 mL, Nebulization, 4x Daily - RT  isosorbide mononitrate, 30 mg, Oral, QAM  levothyroxine, 25 mcg, Oral, Daily  lidocaine, 1 patch, Transdermal, Q24H  lisinopril, 10 mg, Oral, Daily  metoprolol tartrate, 50 mg, Oral, Q12H  montelukast, 10 mg, Oral, Nightly  nystatin, , Topical, TID  oxybutynin XL, 5 mg, Oral, Daily  pantoprazole, 40 mg, Oral, Nightly  ranolazine, 500 mg, Oral, Q12H  sennosides-docusate, 2 tablet, Oral, BID  sodium chloride, 10 mL, Intravenous, Q12H      sodium chloride, 75 mL/hr, Last Rate: Stopped (11/13/21 1756)        Assessment/Plan   ASSESSMENT / PLAN      Chest pain    COPD (chronic obstructive pulmonary disease) (HCC)    CKD (chronic kidney disease), symptom management only, stage 5 (HCC)    Acute on chronic congestive heart failure (HCC)    Anemia    Type 2 diabetes mellitus with hyperglycemia (HCC)    Pneumonitis    1.  ESRD on HD- Initiated HD on 10/21 due to hyperkalemia and fluid overload, now likely ESRD.  HD TTS now. hd next on Saturday. Close to  1.2L removed yesterday    2.  Hyponatremia- Na <130. Modulate with HD     3.  Chest pain- tress test shows small-sized, mildly severe area of ischemia located in the basal inferior lateral wall. Pronto thrombectomy of the left main coronary artery.  PTCA and stenting of the distal left main and ostial circumflex artery with drug-eluting stent.     4.  History of CAD     5.  History of COPD - On trilogy at night but left it at home     6.  Anemia / recent GI bleed / b12 deficiency-  s/p capsule endoscopy which showed few small non-bleeding intestinal AVMs and single small polyp. Keep b12. Keep folate. Check Fe. epogen with hd     7. Isolation- history of CRE in the  past     8. HTN- Continue home antihypertensives. Low dose lisinopril.                This document has been electronically signed by Ace Lauren MD on 2021 07:39 CST           Electronically signed by Ace Lauren MD at 21 0742     Alvarado Mauricio MD at 21 0617     Attestation signed by Alex Wells MD at 21 1230    I have seen and evaluated the patient.  I have discussed the case with the resident. I have reviewed the notes, assessment and plan, and/or procedures performed by the resident. I concur with the resident’s documentation.     Physical Exam:  General: NAD.  CV: S1 and S2 normal. RRR.  Pulmonary: Clear to auscultation bilaterally, no wheezing, no rales.   Abdomen: Bowel sounds present and normal. Abdomen is soft, obese, and nontender   Extremities: No lower extremity edema.      Plan: 61 y.o. female with a CMH of HTN, CAD, HLD, DM, CKD stage IV, COPD, and chronic respiratory failure on 3 L oxygen admitted for ACS rule out. Pt s/p cardiac cath. Pt on dialysis being managed by Nephrology.                      FAMILY MEDICINE DAILY PROGRESS NOTE    NAME: Yamileth Slater  : 1960  MRN: [id number]      LOS: 2 days     PROVIDER OF SERVICE: Alvarado Mauricio MD    Chief Complaint: Chest pain    Subjective:     Interval History:  History taken from: patient    Afebrile. Vital signs stable.    Patient status post 1 day from left heart catheterization with percutaneous intervention. Patient also completed hemodialysis yesterday.    Endocrinologist placed patient on insulin drip overnight and now transitioning to subcutaneous insulin.  Will be doing a carb count today.    Hemoglobin 7.3 this morning trending down from 7.5 yesterday and 8 prior to her left heart catheterization, in which she had a hematoma.  We will continue to trend and transfuse if necessary.    Patient has no complaints today other than groin soreness from her procedure and hunger. Her chest  pain has resolved.    Restarting patient's diet and transferring to the floor.      Review of Systems:   Review of Systems   Constitutional: Positive for fatigue.   Respiratory: Negative for shortness of breath and wheezing.    Cardiovascular: Negative for chest pain and leg swelling.   Gastrointestinal: Negative for nausea and vomiting.   Musculoskeletal:        + Groin soreness status post left heart catheterization   Neurological: Negative for weakness and light-headedness.       Objective:     Vital Signs  Temp:  [98 °F (36.7 °C)-98.6 °F (37 °C)] 98.3 °F (36.8 °C)  Heart Rate:  [58-89] 62  Resp:  [16-18] 16  BP: ()/(44-85) 114/54  Body mass index is 52.22 kg/m².    Physical Exam  Physical Exam  Constitutional:       General: She is not in acute distress.     Appearance: She is obese. She is not toxic-appearing.      Comments: Patient on nasal cannula   HENT:      Head: Normocephalic and atraumatic.   Cardiovascular:      Rate and Rhythm: Normal rate and regular rhythm.   Pulmonary:      Effort: Pulmonary effort is normal.      Breath sounds: Normal breath sounds.   Musculoskeletal:      Cervical back: Normal range of motion.      Right lower leg: No edema.      Left lower leg: No edema.   Neurological:      Mental Status: She is alert.         Medication Review    Current Facility-Administered Medications:   •  acetaminophen (TYLENOL) tablet 650 mg, 650 mg, Oral, Q8H PRN, Carline Coffey MD, 650 mg at 11/10/21 2214  •  albuterol (PROVENTIL) nebulizer solution 0.083% 2.5 mg/3mL, 2.5 mg, Nebulization, Q4H PRN, Carline Coffey MD  •  aspirin EC tablet 81 mg, 81 mg, Oral, Daily, Carline Coffey MD, 81 mg at 11/12/21 0830  •  atorvastatin (LIPITOR) tablet 20 mg, 20 mg, Oral, Daily, Carline Coffey MD, 20 mg at 11/12/21 0831  •  bumetanide (BUMEX) tablet 2 mg, 2 mg, Oral, Once per day on Sun Mon Wed Fri, Carline Coffey MD, 2 mg at 11/12/21 0830  •  cetirizine (zyrTEC) tablet 10 mg, 10 mg, Oral, Daily,  Carline Coffey MD, 10 mg at 11/12/21 0830  •  clopidogrel (PLAVIX) tablet 75 mg, 75 mg, Oral, Daily, Carline Coffey MD, 75 mg at 11/12/21 0830  •  cyclobenzaprine (FLEXERIL) tablet 10 mg, 10 mg, Oral, BID PRN, Carline Coffey MD  •  dextrose (D50W) (25 g/50 mL) IV injection 25 g, 25 g, Intravenous, Q15 Min PRN, Carline Coffey MD  •  dextrose (GLUTOSE) oral gel 15 g, 15 g, Oral, Q15 Min PRN, Carline Coffey MD  •  DULoxetine (CYMBALTA) DR capsule 20 mg, 20 mg, Oral, Daily, Carline Coffey MD, 20 mg at 11/12/21 0830  •  [START ON 11/16/2021] epoetin hubert-epbx (RETACRIT) injection 10,000 Units, 10,000 Units, Intravenous, Once per day on Tue Thu SatXin Imran, MD  •  ferrous sulfate EC tablet 324 mg, 324 mg, Oral, Daily With Breakfast, Carline Coffey MD, 324 mg at 11/12/21 0831  •  fluticasone (FLONASE) 50 MCG/ACT nasal spray 2 spray, 2 spray, Each Nare, Daily, Carline Coffey MD, 2 spray at 11/11/21 0802  •  gabapentin (NEURONTIN) capsule 400 mg, 400 mg, Oral, TID, Carline Coffey MD, 400 mg at 11/13/21 2048  •  glucagon (human recombinant) (GLUCAGEN DIAGNOSTIC) injection 1 mg, 1 mg, Subcutaneous, Q15 Min PRN, Carline Coffey MD  •  heparin (porcine) 5000 UNIT/ML injection 5,000 Units, 5,000 Units, Subcutaneous, Q12H, Carline Coffey MD, 5,000 Units at 11/13/21 2047  •  heparin (porcine) injection 2,000 Units, 2,000 Units, Intracatheter, PRN, Carline Coffey MD, 2,000 Units at 11/13/21 1757  •  heparin (porcine) injection 2,000 Units, 2,000 Units, Intracatheter, PRN, Carline Coffey MD, 2,000 Units at 11/13/21 1755  •  influenza vac split quad (FLUZONE,FLUARIX,AFLURIA,FLULAVAL) injection 0.5 mL, 0.5 mL, Intramuscular, During Hospitalization, Carline Coffey MD  •  insulin aspart (novoLOG) injection 3-12 Units, 3-12 Units, Subcutaneous, 4x Daily AC & at Bedtime, Amor Rivera MD  •  [START ON 11/15/2021] insulin aspart (novoLOG) injection 3-12 Units, 3-12 Units,  Subcutaneous, Q24H, Amor Rivera MD  •  insulin aspart (novoLOG) injection 5-20 Units, 5-20 Units, Subcutaneous, TID With Meals, Amor Rivera MD  •  insulin detemir (LEVEMIR) injection 50 Units, 50 Units, Subcutaneous, Daily, Amor Rivera MD, 50 Units at 11/14/21 0725  •  ipratropium-albuterol (DUO-NEB) nebulizer solution 3 mL, 3 mL, Nebulization, 4x Daily - RT, Carline Coffey MD, 3 mL at 11/14/21 0812  •  isosorbide mononitrate (IMDUR) 24 hr tablet 30 mg, 30 mg, Oral, QAM, Carline Coffey MD, 30 mg at 11/14/21 0627  •  levothyroxine (SYNTHROID, LEVOTHROID) tablet 25 mcg, 25 mcg, Oral, Daily, Carline Coffey MD, 25 mcg at 11/12/21 0831  •  lidocaine (LIDODERM) 5 % 1 patch, 1 patch, Transdermal, Q24H, Carline Coffey MD, 1 patch at 11/12/21 0831  •  lisinopril (PRINIVIL,ZESTRIL) tablet 5 mg, 5 mg, Oral, Daily, DosAce gamble MD  •  melatonin tablet 1.5 mg, 1.5 mg, Oral, Nightly PRN, Carline Coffey MD  •  metoprolol tartrate (LOPRESSOR) tablet 50 mg, 50 mg, Oral, Q12H, Carline Coffey MD, 50 mg at 11/13/21 2047  •  montelukast (SINGULAIR) tablet 10 mg, 10 mg, Oral, Nightly, Carline Coffey MD, 10 mg at 11/13/21 2047  •  morphine injection 1 mg, 1 mg, Intravenous, Q4H PRN, 1 mg at 11/12/21 2124 **AND** naloxone (NARCAN) injection 0.4 mg, 0.4 mg, Intravenous, Q5 Min PRN, Carline Coffey MD  •  nitroglycerin (NITROSTAT) SL tablet 0.4 mg, 0.4 mg, Sublingual, Q5 Min PRN, Carline Coffey MD  •  nystatin (MYCOSTATIN) powder, , Topical, TID, Carline Coffey MD, Given at 11/13/21 2341  •  ondansetron (ZOFRAN) injection 4 mg, 4 mg, Intravenous, Q6H PRN, Carline Coffey MD  •  ondansetron ODT (ZOFRAN-ODT) disintegrating tablet 4 mg, 4 mg, Oral, Q8H PRN, Carline Coffey MD  •  oxybutynin XL (DITROPAN-XL) 24 hr tablet 5 mg, 5 mg, Oral, Daily, Carline Coffey MD, 5 mg at 11/12/21 0830  •  pantoprazole (PROTONIX) EC tablet 40 mg, 40 mg, Oral, Nightly, Alexx  MD Carline, 40 mg at 11/13/21 2047  •  promethazine (PHENERGAN) tablet 25 mg, 25 mg, Oral, Q6H PRN, Carline Coffey MD  •  ranolazine (RANEXA) 12 hr tablet 500 mg, 500 mg, Oral, Q12H, Carline Coffey MD, 500 mg at 11/13/21 2047  •  sennosides-docusate (PERICOLACE) 8.6-50 MG per tablet 2 tablet, 2 tablet, Oral, BID, Carline oCffey MD, 2 tablet at 11/13/21 2047  •  sodium chloride 0.9 % flush 10 mL, 10 mL, Intravenous, PRN, Carline Coffey MD  •  sodium chloride 0.9 % flush 10 mL, 10 mL, Intravenous, Q12H, Carline Coffey MD, 10 mL at 11/12/21 2127  •  sodium chloride 0.9 % flush 10 mL, 10 mL, Intravenous, PRN, Carline Coffey MD  •  sodium chloride 0.9 % infusion, 75 mL/hr, Intravenous, Continuous, Carline Coffey MD, Stopped at 11/13/21 1756     Diagnostic Data    Lab Results (last 24 hours)     Procedure Component Value Units Date/Time    POC Glucose Once [[id number]]  (Abnormal) Collected: 11/14/21 0530    Specimen: Blood Updated: 11/14/21 0549     Glucose 142 mg/dL      Comment: RN NotifiedOperator: [id number] GRICELDA Boys RanchMeter ID: [id number]       POC Glucose Once [[id number]]  (Normal) Collected: 11/14/21 0431    Specimen: Blood Updated: 11/14/21 0549     Glucose 115 mg/dL      Comment: : [id number] THIERNO RIVASAMeter ID: [id number]       Comprehensive Metabolic Panel [[id number]]  (Abnormal) Collected: 11/14/21 0503    Specimen: Blood Updated: 11/14/21 0546     Glucose 136 mg/dL      BUN 26 mg/dL      Creatinine 2.77 mg/dL      Sodium 135 mmol/L      Potassium 4.3 mmol/L      Comment: Slight hemolysis detected by analyzer. Results may be affected.        Chloride 96 mmol/L      CO2 26.0 mmol/L      Calcium 9.1 mg/dL      Total Protein 7.7 g/dL      Albumin 3.50 g/dL      ALT (SGPT) 8 U/L      AST (SGOT) 17 U/L      Alkaline Phosphatase 114 U/L      Total Bilirubin 0.2 mg/dL      eGFR Non African Amer 17 mL/min/1.73      Globulin 4.2 gm/dL      A/G Ratio 0.8 g/dL       BUN/Creatinine Ratio 9.4     Anion Gap 13.0 mmol/L     Narrative:      GFR Normal >60  Chronic Kidney Disease <60  Kidney Failure <15      CBC & Differential [[id number]]  (Abnormal) Collected: 11/14/21 0503    Specimen: Blood Updated: 11/14/21 0533    Narrative:      The following orders were created for panel order CBC & Differential.  Procedure                               Abnormality         Status                     ---------                               -----------         ------                     CBC Auto Differential[[id number]]        Abnormal            Final result                 Please view results for these tests on the individual orders.    CBC Auto Differential [[id number]]  (Abnormal) Collected: 11/14/21 0503    Specimen: Blood Updated: 11/14/21 0533     WBC 7.56 10*3/mm3      RBC 2.69 10*6/mm3      Hemoglobin 7.3 g/dL      Hematocrit 22.5 %      MCV 83.6 fL      MCH 27.1 pg      MCHC 32.4 g/dL      RDW 15.6 %      RDW-SD 46.2 fl      MPV 8.9 fL      Platelets 312 10*3/mm3      Neutrophil % 61.0 %      Lymphocyte % 24.6 %      Monocyte % 7.9 %      Eosinophil % 4.1 %      Basophil % 1.1 %      Immature Grans % 1.3 %      Neutrophils, Absolute 4.61 10*3/mm3      Lymphocytes, Absolute 1.86 10*3/mm3      Monocytes, Absolute 0.60 10*3/mm3      Eosinophils, Absolute 0.31 10*3/mm3      Basophils, Absolute 0.08 10*3/mm3      Immature Grans, Absolute 0.10 10*3/mm3      nRBC 0.0 /100 WBC     POC Glucose Once [[id number]]  (Normal) Collected: 11/14/21 0329    Specimen: Blood Updated: 11/14/21 0342     Glucose 109 mg/dL      Comment: RN NotifiedOperator: [id number] TRACE JAREDMeter ID: [id number]       POC Glucose Once [[id number]]  (Normal) Collected: 11/14/21 0233    Specimen: Blood Updated: 11/14/21 0256     Glucose 110 mg/dL      Comment: : [id number] THIERNO NOEMYSEAMeter ID: [id number]       POC Glucose Once [[id number]]  (Normal) Collected: 11/14/21 0133    Specimen: Blood Updated:  11/14/21 0202     Glucose 98 mg/dL      Comment: : [id number] THIERNO CHELSEAMeter ID: [id number]       Basic Metabolic Panel [[id number]]  (Abnormal) Collected: 11/14/21 0041    Specimen: Blood Updated: 11/14/21 0144     Glucose 92 mg/dL      BUN 23 mg/dL      Creatinine 2.61 mg/dL      Sodium 133 mmol/L      Potassium 4.0 mmol/L      Chloride 96 mmol/L      CO2 28.0 mmol/L      Calcium 9.2 mg/dL      eGFR Non African Amer 19 mL/min/1.73      BUN/Creatinine Ratio 8.8     Anion Gap 9.0 mmol/L     Narrative:      GFR Normal >60  Chronic Kidney Disease <60  Kidney Failure <15      Hemoglobin & Hematocrit, Blood [[id number]]  (Abnormal) Collected: 11/14/21 0041    Specimen: Blood Updated: 11/14/21 0139     Hemoglobin 7.5 g/dL      Hematocrit 23.1 %     POC Glucose Once [[id number]]  (Normal) Collected: 11/14/21 0031    Specimen: Blood Updated: 11/14/21 0043     Glucose 104 mg/dL      Comment: : [id number] THIERNO CHELSEAMeter ID: [id number]       POC Glucose Once [[id number]]  (Abnormal) Collected: 11/13/21 2313    Specimen: Blood Updated: 11/13/21 2326     Glucose 134 mg/dL      Comment: RN NotifiedOperator: [id number] [employer] Log[employer] CorporationMeter ID: [id number]       POC Glucose Once [[id number]]  (Abnormal) Collected: 11/13/21 2213    Specimen: Blood Updated: 11/13/21 2244     Glucose 164 mg/dL      Comment: RN NotifiedOperator: [id number] [employer]iron Logistics CorporationMeter ID: [id number]       POC Glucose Once [[id number]]  (Abnormal) Collected: 11/13/21 2123    Specimen: Blood Updated: 11/13/21 2244     Glucose 197 mg/dL      Comment: : [id number] THIERNO CHELSEAMeter ID: [id number]       POC Glucose Once [[id number]]  (Abnormal) Collected: 11/13/21 2028    Specimen: Blood Updated: 11/13/21 2042     Glucose 213 mg/dL      Comment: RN NotifiedOperator: [id number] Saint Monica's HomeMeter ID: [id number]       POC Glucose Once [[id number]]  (Abnormal) Collected: 11/13/21 1929    Specimen: Blood Updated: 11/13/21  2042     Glucose 201 mg/dL      Comment: : [id number] THIERNO RIVASAMeter ID: [id number]       Basic Metabolic Panel [[id number]]  (Abnormal) Collected: 11/13/21 1841    Specimen: Blood Updated: 11/13/21 1908     Glucose 214 mg/dL      BUN 21 mg/dL      Creatinine 1.95 mg/dL      Sodium 133 mmol/L      Potassium 4.0 mmol/L      Chloride 95 mmol/L      CO2 26.0 mmol/L      Calcium 9.0 mg/dL      eGFR Non African Amer 26 mL/min/1.73      BUN/Creatinine Ratio 10.8     Anion Gap 12.0 mmol/L     Narrative:      GFR Normal >60  Chronic Kidney Disease <60  Kidney Failure <15      POC Glucose Once [[id number]]  (Abnormal) Collected: 11/13/21 1713    Specimen: Blood Updated: 11/13/21 1823     Glucose 187 mg/dL      Comment: RN NotifiedOperator: [id number] DARIEN SHONTIKAMeter ID: [id number]       POC Glucose Once [[id number]]  (Abnormal) Collected: 11/13/21 1808    Specimen: Blood Updated: 11/13/21 1823     Glucose 192 mg/dL      Comment: : [id number] FALLON LEHMANNEYMeter ID: [id number]       Hemoglobin & Hematocrit, Blood [[id number]]  (Abnormal) Collected: 11/13/21 1733    Specimen: Blood Updated: 11/13/21 1743     Hemoglobin 8.0 g/dL      Hematocrit 24.3 %     POC Glucose Once [[id number]]  (Abnormal) Collected: 11/13/21 1624    Specimen: Blood Updated: 11/13/21 1637     Glucose 231 mg/dL      Comment: RN NotifiedOperator: [id number] PATRIC PILLAIEEMeter ID: [id number]       POC Glucose Once [[id number]]  (Abnormal) Collected: 11/11/21 1015    Specimen: Blood Updated: 11/13/21 1212     Glucose 413 mg/dL      Comment: RN NotifiedOperator: [id number] KEVIN CORTEZFANYMeter ID: [id number]       POC Glucose Once [[id number]]  (Abnormal) Collected: 11/11/21 1014    Specimen: Blood Updated: 11/13/21 1212     Glucose 402 mg/dL      Comment: RN NotifiedOperator: [id number] KEVIN TIFFANYMeter ID: [id number]       POC Glucose Once [[id number]]  (Abnormal) Collected: 11/12/21 1955    Specimen: Blood Updated:  11/13/21 1156     Glucose 319 mg/dL      Comment: RN NotifiedOperator: [id number] PTARICIA POLLOCKMeter ID: [id number]       POC Glucose Once [[id number]]  (Abnormal) Collected: 11/12/21 1009    Specimen: Blood Updated: 11/13/21 1156     Glucose 431 mg/dL      Comment: RN NotifiedOperator: [id number] JUAQUIN MCKEONMeter ID: [id number]               I reviewed the patient's new clinical results.    Assessment/Plan:     Active Hospital Problems    Diagnosis  POA   • **Chest pain [R07.9]  Yes     -S/P CABG x 3 (Primary), Bilateral carotid artery stenosis w/ 2 stents on left side,  PAD (peripheral artery disease) with stent in right upper leg, Morbid obesity   -Continue aspirin, Plavix, atorvastatin, lisinopril,  isosorbide mononitrate 60 mg daily     -EKG: No ST segment changes.   -Troponin negative  -Underwent heart catheterization, stents placed in left main circumflex and ostial circumflex artery. Patient also received thrombectomy of left main coronary artery and selective cannulation of left internal mammary artery of saphenous vein graft to right coronary artery          • Anemia [D64.9]  Yes     -Anemia of chronic disease vs GI bleed  -No active bleeding on recent EGD/Colonoscopy  -Recent capsule endoscopy which showed few small nonbleeding intestinal AVMs and single small intestinal polyp which GI planned to follow outpatient  -Hematoma during heart cath  -Continue to monitor H/H  -Will transfuse if Hgb <7  -Continue home ferrous sulfate  -Nephrology to start epogen with hemodialysis     • Type 2 diabetes mellitus with hyperglycemia (HCC) [E11.65]  Yes     Dr. Stephenson of Endocrinology consulted, initiated patient on insulin drip with plans to switch to basal, bolus insulin  Is considering initiating GLP 1 and/or sglt 2 prior to discharge          • Pneumonitis [J18.9]  Unknown   • Acute on chronic congestive heart failure (HCC) [I50.9]  Yes     - Continue Imdur, metoprolol, lisinopril      • CKD (chronic  kidney disease), symptom management only, stage 5 ([facility]) [N18.5]  Yes     Results from last 7 days   Lab Units 11/14/21  0503 11/14/21  0041 11/13/21  1841   CREATININE mg/dL 2.77* 2.61* 1.95*     -Completes outpatient HD on TTS  -Nephrology on board. Will require HD in house     • COPD (chronic obstructive pulmonary disease) ([facility]) [J44.9]  Yes     -O2 supplementation  -Home inhalers as needed  -DuoNebs  -CPAP at night          DVT prophylaxis: Heparin  Code status is   Code Status and Medical Interventions:   Ordered at: 11/08/21 1734     Code Status (Patient has no pulse and is not breathing):    CPR (Attempt to Resuscitate)     Medical Interventions (Patient has pulse or is breathing):    Full Support       Plan for disposition:To place of residence in 1-2 days       Time: 15 min         This document has been electronically signed by Alvarado Mauricio MD on November 14, 2021 08:37 CST    Part of the note may be an electronic transcription or translation of spoken language to printed text using the Dragon Dictation System       Electronically signed by Alex Wells MD at 11/14/21 1230     Alvarado Mauricio MD at 11/13/21 0950     Attestation signed by Alex Wells MD at 11/13/21 1243    I have seen and evaluated the patient.  I have discussed the case with the resident. I have reviewed the notes, assessment and plan, and/or procedures performed by the resident. I concur with the resident’s documentation.     Physical Exam:  General: NAD.  CV: S1 and S2 normal. RRR.  Pulmonary: Clear to auscultation bilaterally, no wheezing, no rales.   Abdomen: Bowel sounds present and normal. Abdomen is soft, obese, and nontender   Extremities: No lower extremity edema.      Plan: 61 y.o. female with a CMH of HTN, CAD, HLD, DM, CKD stage IV, COPD, and chronic respiratory failure on 3 L oxygen admitted for ACS rule out. Pt to get cardiac cath today.                      FAMILY MEDICINE DAILY PROGRESS NOTE    NAME:  Yamileth Slater  : 1960  MRN: [id number]      LOS: 1 day     PROVIDER OF SERVICE: Alvarado Mauricio MD    Chief Complaint: Chest pain    Subjective:     Interval History:  History taken from: patient chart       Afebrile. VS stable.     Patient was taken back for heart cath this morning, so went to cath lab to lay eyes on her. Review of systems and physical limited to cath lab status. Will return and round again when she is released to the floor.    Critical lab value of hemoglobin 7.7. Previous documentation suggests to transfuse for hemoglobin under 8, but patient currently receiving heart cath. Will discuss with team.    Review of Systems:   Review of Systems   Unable to perform ROS: Other   Patient receiving heart cath.     Objective:     Vital Signs  Temp:  [96.9 °F (36.1 °C)-97.5 °F (36.4 °C)] 96.9 °F (36.1 °C)  Heart Rate:  [66-81] 68  Resp:  [16-18] 18  BP: (132-146)/(60-65) 144/62  Body mass index is 52.75 kg/m².    Physical Exam  Physical Exam  Constitutional:       Comments: Patient on cath lab table undergoing heart cath          Medication Review    Current Facility-Administered Medications:   •  [MAR Hold] acetaminophen (TYLENOL) tablet 650 mg, 650 mg, Oral, Q8H PRN, Carline Coffey MD, 650 mg at 11/10/21 2214  •  [MAR Hold] albuterol (PROVENTIL) nebulizer solution 0.083% 2.5 mg/3mL, 2.5 mg, Nebulization, Q4H PRN, Carline Coffey MD  •  [MAR Hold] aspirin EC tablet 81 mg, 81 mg, Oral, Daily, Carline Coffey MD, 81 mg at 21 0830  •  [MAR Hold] atorvastatin (LIPITOR) tablet 20 mg, 20 mg, Oral, Daily, Carline Coffey MD, 20 mg at 21 0831  •  bivalirudin (ANGIOMAX) bolus from bag, , , PRN, Niall Rios MD, 98.25 mg at 21 0942  •  [MAR Hold] bumetanide (BUMEX) tablet 2 mg, 2 mg, Oral, Once per day on Sun , Ace Lauren MD, 2 mg at 21 0830  •  [MAR Hold] cetirizine (zyrTEC) tablet 10 mg, 10 mg, Oral, Daily, Carline Coffey MD, 10 mg at 21  0830  •  [MAR Hold] clopidogrel (PLAVIX) tablet 75 mg, 75 mg, Oral, Daily, Carline Coffey MD, 75 mg at 11/12/21 0830  •  [MAR Hold] cyclobenzaprine (FLEXERIL) tablet 10 mg, 10 mg, Oral, BID PRN, Carline Coffey MD  •  [MAR Hold] dextrose (D50W) (25 g/50 mL) IV injection 25 g, 25 g, Intravenous, Q15 Min PRN, Carline Coffey MD  •  [MAR Hold] dextrose (GLUTOSE) oral gel 15 g, 15 g, Oral, Q15 Min PRN, Carline Coffey MD  •  [MAR Hold] DULoxetine (CYMBALTA) DR capsule 20 mg, 20 mg, Oral, Daily, Carline Coffey MD, 20 mg at 11/12/21 0830  •  fentaNYL citrate (PF) (SUBLIMAZE) injection, , , PRN, Niall Rios MD, 25 mcg at 11/13/21 0859  •  [MAR Hold] ferrous sulfate EC tablet 324 mg, 324 mg, Oral, Daily With Breakfast, Carline Coffey MD, 324 mg at 11/12/21 0831  •  [MAR Hold] fluticasone (FLONASE) 50 MCG/ACT nasal spray 2 spray, 2 spray, Each Nare, Daily, Charlene Castro MD, 2 spray at 11/11/21 0802  •  gabapentin (NEURONTIN) capsule 400 mg, 400 mg, Oral, TID, Carline Coffey MD, 400 mg at 11/12/21 2123  •  [MAR Hold] glucagon (human recombinant) (GLUCAGEN DIAGNOSTIC) injection 1 mg, 1 mg, Subcutaneous, Q15 Min PRN, Carline Coffey MD  •  [MAR Hold] heparin (porcine) 5000 UNIT/ML injection 5,000 Units, 5,000 Units, Subcutaneous, Q12H, Carline Coffey MD, 5,000 Units at 11/12/21 2124  •  [MAR Hold] heparin (porcine) injection 2,000 Units, 2,000 Units, Intracatheter, PRN, Ace Lauren MD, 2,000 Units at 11/11/21 1253  •  [MAR Hold] heparin (porcine) injection 2,000 Units, 2,000 Units, Intracatheter, PRN, Ace aLuren MD, 2,000 Units at 11/11/21 1251  •  heparin 1000 units in sodium chloride 0.9% IV infusion, , , PRN, Niall Rios MD, 1,000 mL at 11/13/21 0828  •  heparin 5000 units in sodium chloride 0.9% IV infusion, , , PRN, Niall Rios MD, 500 mL at 11/13/21 0828  •  [MAR Hold] influenza vac split quad (FLUZONE,FLUARIX,AFLURIA,FLULAVAL) injection 0.5 mL, 0.5 mL, Intramuscular,  During Hospitalization, Kit Brar MD  •  [MAR Hold] insulin aspart (novoLOG) injection 3-15 Units, 3-15 Units, Subcutaneous, 4x Daily AC & at Bedtime, Amor Rivera MD, 12 Units at 11/12/21 2139  •  [MAR Hold] insulin aspart (novoLOG) injection 3-15 Units, 3-15 Units, Subcutaneous, Q24H, Amor Rivera MD  •  [MAR Hold] insulin aspart (novoLOG) injection 8-40 Units, 8-40 Units, Subcutaneous, TID With Meals, Amor Rivera MD  •  [MAR Hold] insulin detemir (LEVEMIR) injection 60 Units, 60 Units, Subcutaneous, Q12H, mAor Rivera MD  •  [MAR Hold] ipratropium-albuterol (DUO-NEB) nebulizer solution 3 mL, 3 mL, Nebulization, 4x Daily - RT, Carline Coffey MD, 3 mL at 11/13/21 0639  •  [MAR Hold] isosorbide mononitrate (IMDUR) 24 hr tablet 30 mg, 30 mg, Oral, QAM, Carline Coffey MD, 30 mg at 11/12/21 0623  •  [MAR Hold] levothyroxine (SYNTHROID, LEVOTHROID) tablet 25 mcg, 25 mcg, Oral, Daily, Carline Coffey MD, 25 mcg at 11/12/21 0831  •  [MAR Hold] lidocaine (LIDODERM) 5 % 1 patch, 1 patch, Transdermal, Q24H, Carline Coffey MD, 1 patch at 11/12/21 0831  •  lidocaine (XYLOCAINE) 2% injection, , , PRN, Niall Rios MD, 20 mL at 11/13/21 0910  •  lisinopril (PRINIVIL,ZESTRIL) tablet 10 mg, 10 mg, Oral, Daily, Carline Coffey MD, 10 mg at 11/12/21 0830  •  [MAR Hold] melatonin tablet 1.5 mg, 1.5 mg, Oral, Nightly PRN, Carline Coffey MD  •  metoprolol tartrate (LOPRESSOR) tablet 50 mg, 50 mg, Oral, Q12H, Carline Coffey MD, 50 mg at 11/12/21 2124  •  midazolam (VERSED) injection, , , PRN, Niall Rios MD, 1 mg at 11/13/21 0859  •  [MAR Hold] montelukast (SINGULAIR) tablet 10 mg, 10 mg, Oral, Nightly, Carline Coffey MD, 10 mg at 11/12/21 2124  •  [MAR Hold] morphine injection 1 mg, 1 mg, Intravenous, Q4H PRN, 1 mg at 11/12/21 2124 **AND** [MAR Hold] naloxone (NARCAN) injection 0.4 mg, 0.4 mg, Intravenous, Q5 Min PRN, Carline Coffey MD  •   [MAR Hold] nitroglycerin (NITROSTAT) SL tablet 0.4 mg, 0.4 mg, Sublingual, Q5 Min PRN, Carline Coffey MD  •  [MAR Hold] nystatin (MYCOSTATIN) powder, , Topical, TID, Carline Coffey MD, Given at 11/12/21 1557  •  [MAR Hold] ondansetron (ZOFRAN) injection 4 mg, 4 mg, Intravenous, Q6H PRN, Carline Coffey MD  •  [MAR Hold] ondansetron ODT (ZOFRAN-ODT) disintegrating tablet 4 mg, 4 mg, Oral, Q8H PRN, Carline Coffey MD  •  [MAR Hold] oxybutynin XL (DITROPAN-XL) 24 hr tablet 5 mg, 5 mg, Oral, Daily, Carline Coffey MD, 5 mg at 11/12/21 0830  •  [MAR Hold] pantoprazole (PROTONIX) EC tablet 40 mg, 40 mg, Oral, Nightly, Carline Coffey MD, 40 mg at 11/12/21 2123  •  [MAR Hold] promethazine (PHENERGAN) tablet 25 mg, 25 mg, Oral, Q6H PRN, Carline Coffey MD  •  [MAR Hold] ranolazine (RANEXA) 12 hr tablet 500 mg, 500 mg, Oral, Q12H, Katalina Argueta, APRN, 500 mg at 11/12/21 2123  •  [MAR Hold] sennosides-docusate (PERICOLACE) 8.6-50 MG per tablet 2 tablet, 2 tablet, Oral, BID, Carline Coffey MD, 2 tablet at 11/12/21 0830  •  [MAR Hold] sodium chloride 0.9 % flush 10 mL, 10 mL, Intravenous, PRN, Carline Coffey MD  •  [MAR Hold] sodium chloride 0.9 % flush 10 mL, 10 mL, Intravenous, Q12H, Carline Coffey MD, 10 mL at 11/12/21 2127  •  [MAR Hold] sodium chloride 0.9 % flush 10 mL, 10 mL, Intravenous, PRN, Carline Coffey MD  •  sodium chloride 0.9 % flush 10 mL, 10 mL, Intravenous, PRN, Niall Rios MD  •  sodium chloride 0.9 % flush 3 mL, 3 mL, Intravenous, Q12H, Niall Rios MD  •  sodium chloride 0.9 % infusion, 75 mL/hr, Intravenous, Continuous, Niall Rios MD, Last Rate: 75 mL/hr at 11/13/21 0625, 75 mL/hr at 11/13/21 0625     Diagnostic Data    Lab Results (last 24 hours)     Procedure Component Value Units Date/Time    Protime-INR [[id number]]  (Normal) Collected: 11/13/21 0536    Specimen: Blood Updated: 11/13/21 0633     Protime 13.0 Seconds      INR 0.99    Narrative:       Therapeutic range for most indications is 2.0-3.0 INR,  or 2.5-3.5 for mechanical heart valves.    Comprehensive Metabolic Panel [[id number]]  (Abnormal) Collected: 11/13/21 0536    Specimen: Blood Updated: 11/13/21 0627     Glucose 363 mg/dL      BUN 52 mg/dL      Creatinine 4.06 mg/dL      Sodium 127 mmol/L      Potassium 4.5 mmol/L      Chloride 92 mmol/L      CO2 22.0 mmol/L      Calcium 9.1 mg/dL      Total Protein 7.2 g/dL      Albumin 3.10 g/dL      ALT (SGPT) 9 U/L      AST (SGOT) 10 U/L      Alkaline Phosphatase 160 U/L      Total Bilirubin 0.2 mg/dL      eGFR Non African Amer 11 mL/min/1.73      Comment: <15 Indicative of kidney failure.        eGFR   Amer --     Comment: <15 Indicative of kidney failure.        Globulin 4.1 gm/dL      A/G Ratio 0.8 g/dL      BUN/Creatinine Ratio 12.8     Anion Gap 13.0 mmol/L     Narrative:      GFR Normal >60  Chronic Kidney Disease <60  Kidney Failure <15      CBC & Differential [[id number]]  (Abnormal) Collected: 11/13/21 0536    Specimen: Blood Updated: 11/13/21 0556    Narrative:      The following orders were created for panel order CBC & Differential.  Procedure                               Abnormality         Status                     ---------                               -----------         ------                     CBC Auto Differential[[id number]]        Abnormal            Final result                 Please view results for these tests on the individual orders.    CBC Auto Differential [[id number]]  (Abnormal) Collected: 11/13/21 0536    Specimen: Blood Updated: 11/13/21 0556     WBC 7.65 10*3/mm3      RBC 2.82 10*6/mm3      Hemoglobin 7.7 g/dL      Hematocrit 23.1 %      MCV 81.9 fL      MCH 27.3 pg      MCHC 33.3 g/dL      RDW 14.9 %      RDW-SD 43.9 fl      MPV 8.9 fL      Platelets 273 10*3/mm3      Neutrophil % 62.7 %      Lymphocyte % 23.9 %      Monocyte % 6.8 %      Eosinophil % 3.8 %      Basophil % 1.0 %      Immature Grans % 1.8 %       Neutrophils, Absolute 4.79 10*3/mm3      Lymphocytes, Absolute 1.83 10*3/mm3      Monocytes, Absolute 0.52 10*3/mm3      Eosinophils, Absolute 0.29 10*3/mm3      Basophils, Absolute 0.08 10*3/mm3      Immature Grans, Absolute 0.14 10*3/mm3      nRBC 0.0 /100 WBC     POC Glucose Once [[id number]]  (Abnormal) Collected: 11/12/21 1610    Specimen: Blood Updated: 11/12/21 1636     Glucose 253 mg/dL      Comment: RN NotifiedOperator: [id number] JUAQUIN MCKEONMeter ID: [id number]               I reviewed the patient's new clinical results.    Assessment/Plan:     Active Hospital Problems    Diagnosis  POA   • **Chest pain [R07.9]  Yes     -S/P CABG x 3 (Primary), Bilateral carotid artery stenosis w/ 2 stents on left side,  PAD (peripheral artery disease) with stent in right upper leg, Morbid obesity   -Continue aspirin, Plavix, atorvastatin, lisinopril,  isosorbide mononitrate 60 mg daily     -EKG: No ST segment changes.   -Troponin negative  -currently undergoing cardiac cath      • Anemia [D64.9]  Unknown     -Anemia of chronic disease vs GI bleed  -No active bleeding on recent EGD/Colonoscopy  -Recent capsule endoscopy which showed few small nonbleeding intestinal AVMs and single small intestinal polyp which GI planned to follow outpatient  -Continue to monitor H/H  -Will transfuse if Hgb <8  -Continue home ferrous sulfate. May be a candidate for epogen and IV iron  -Follow stool occult test     • Type 2 diabetes mellitus with hyperglycemia (HCC) [E11.65]  Unknown     -Continue 20 units of levemir in the morning , 30 units of novolog with meals, and 50 units of levemir at night  -SSI for additional coverage     • Pneumonitis [J18.9]  Unknown   • Acute on chronic congestive heart failure (HCC) [I50.9]  Yes     - Continue Imdur, metoprolol, lisinopril      • CKD (chronic kidney disease), symptom management only, stage 5 (HCC) [N18.5]  Yes     Results from last 7 days   Lab Units 11/10/21  0538 11/09/21  0608  "21  1450   CREATININE mg/dL 3.49* 3.16* 3.27*     -Completes outpatient HD on TTS  -Nephrology on board. Will require HD in house     • COPD (chronic obstructive pulmonary disease) ([facility]) [J44.9]  Yes     -O2 supplementation  -Home inhalers as needed  -DuoNebs  -CPAP at night          DVT prophylaxis: Heparin  Code status is   Code Status and Medical Interventions:   Ordered at: 21 1734     Code Status (Patient has no pulse and is not breathing):    CPR (Attempt to Resuscitate)     Medical Interventions (Patient has pulse or is breathing):    Full Support       Plan for disposition:To place of residence in 1-2 days       Time: 15 min         This document has been electronically signed by Alvarado Mauricio MD on 2021 09:54 CST    Part of the note may be an electronic transcription or translation of spoken language to printed text using the Dragon Dictation System       Electronically signed by Alex Wells MD at 21 1243     Ace Lauren MD at 21 1409          [facility]  [address] [place] [address]. [address]  T - [phone]  F - [phone]     Progress Note          PATIENT  DEMOGRAPHICS   PATIENT NAME: Yamileth Smithgonzales Slater                      PHYSICIAN: Ace Lauren MD  : 1960  MRN: [id number]   LOS: 0 days    Patient Care Team:  Rianna Macias MD as PCP - General (Family Medicine)  Subjective   SUBJECTIVE   bp stable   No chest pain          Objective   OBJECTIVE   Vital Signs  Temp:  [96.8 °F (36 °C)-97.3 °F (36.3 °C)] 97 °F (36.1 °C)  Heart Rate:  [] 66  Resp:  [16-18] 16  BP: ()/(52-67) 133/60    Flowsheet Rows      First Filed Value   Admission Height 157.5 cm (62\") Documented at 2021 1434   Admission Weight 128 kg (282 lb) Documented at 2021 1434         I/O last 3 completed shifts:  In: 960 [P.O.:960]  Out: 2200 [Urine:600; Other:1600]    PHYSICAL EXAM    Physical Exam  Vitals reviewed. "   Constitutional:       Appearance: Normal appearance.   HENT:      Nose: Nose normal.   Cardiovascular:      Rate and Rhythm: Normal rate and regular rhythm.      Pulses: Normal pulses.      Heart sounds: Normal heart sounds. No murmur heard.      Pulmonary:      Effort: Pulmonary effort is normal.      Breath sounds: No wheezing or rales.   Abdominal:      General: Abdomen is flat. There is no distension.      Palpations: Abdomen is soft.      Tenderness: There is no abdominal tenderness.   Musculoskeletal:         General: No swelling.   Skin:     Coloration: Skin is not jaundiced.      Findings: No erythema.   Neurological:      General: No focal deficit present.      Mental Status: She is alert. She is disoriented.         RESULTS   Results Review:    Results from last 7 days   Lab Units 11/12/21  0531 11/11/21  0600 11/10/21  0538   SODIUM mmol/L 127* 125* 127*   POTASSIUM mmol/L 4.9 4.6 4.5   CHLORIDE mmol/L 92* 88* 90*   CO2 mmol/L 20.0* 21.0* 23.0   BUN mg/dL 44* 56* 47*   CREATININE mg/dL 3.49* 4.08* 3.49*   CALCIUM mg/dL 8.9 8.8 8.9   BILIRUBIN mg/dL <0.2 <0.2 0.2   ALK PHOS U/L 145* 177* 151*   ALT (SGPT) U/L 8 9 10   AST (SGOT) U/L 11 9 11   GLUCOSE mg/dL 391* 437* 293*       Estimated Creatinine Clearance: 21.6 mL/min (A) (by C-G formula based on SCr of 3.49 mg/dL (H)).                Results from last 7 days   Lab Units 11/12/21 0531 11/11/21  0600 11/10/21  0538 11/09/21  0608 11/08/21  1450   WBC 10*3/mm3 7.06 8.15 8.41 10.01 8.14   HEMOGLOBIN g/dL 8.4* 8.2* 8.5* 8.4* 7.9*   PLATELETS 10*3/mm3 265 267 288 265 252               Imaging Results (Last 24 Hours)     ** No results found for the last 24 hours. **           MEDICATIONS    aspirin, 81 mg, Oral, Daily  atorvastatin, 20 mg, Oral, Daily  bumetanide, 2 mg, Oral, Once per day on Sun Mon Wed Fri  cetirizine, 10 mg, Oral, Daily  clopidogrel, 75 mg, Oral, Daily  DULoxetine, 20 mg, Oral, Daily  ferrous sulfate, 324 mg, Oral, Daily With  Breakfast  fluticasone, 2 spray, Each Nare, Daily  gabapentin, 400 mg, Oral, TID  heparin (porcine), 5,000 Units, Subcutaneous, Q12H  insulin aspart, 3-15 Units, Subcutaneous, 4x Daily AC & at Bedtime  [START ON 11/13/2021] insulin aspart, 3-15 Units, Subcutaneous, Q24H  insulin aspart, 6-30 Units, Subcutaneous, TID With Meals  insulin detemir, 35 Units, Subcutaneous, Q12H  ipratropium-albuterol, 3 mL, Nebulization, 4x Daily - RT  isosorbide mononitrate, 30 mg, Oral, QAM  levothyroxine, 25 mcg, Oral, Daily  lidocaine, 1 patch, Transdermal, Q24H  lisinopril, 10 mg, Oral, Daily  metoprolol tartrate, 50 mg, Oral, Q12H  montelukast, 10 mg, Oral, Nightly  nystatin, , Topical, TID  oxybutynin XL, 5 mg, Oral, Daily  pantoprazole, 40 mg, Oral, Nightly  ranolazine, 500 mg, Oral, Q12H  sennosides-docusate, 2 tablet, Oral, BID  sodium chloride, 10 mL, Intravenous, Q12H           Assessment/Plan   ASSESSMENT / PLAN      Chest pain    COPD (chronic obstructive pulmonary disease) (HCC)    CKD (chronic kidney disease), symptom management only, stage 5 (HCC)    Acute on chronic congestive heart failure (HCC)    Anemia    Type 2 diabetes mellitus with hyperglycemia (HCC)    Pneumonitis    1.  ESRD on HD- Initiated HD on 10/21 due to hyperkalemia and fluid overload, now likely ESRD.  HD TTS now. hd tomorrow after heart cath     2.  Hyponatremia- Na <130. Modulate with HD     3.  Chest pain- ongoing ACS evaluation, stress test shows small-sized, mildly severe area of ischemia located in the basal inferior lateral wall. Plan for LHC tomorrow     4.  History of CAD     5.  History of COPD - On trilogy at night but left it at home     6.  Anemia / recent GI bleed / b12 deficiency-  s/p capsule endoscopy which showed few small non-bleeding intestinal AVMs and single small polyp. Keep b12. Keep folate. Check Fe.     7. 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 HTN- Continue home antihypertensives. Low dose lisinopril.                 This document has been electronically signed by Ace Lauren MD on 2021 14:09 CST           Electronically signed by Ace Lauren MD at 21 1411     Ashley Coker MD at 21 0817     Attestation signed by Alex Wells MD at 21 1221    I have seen and evaluated the patient.  I have discussed the case with the resident. I have reviewed the notes, assessment and plan, and/or procedures performed by the resident. I concur with the resident’s documentation.    Physical Exam:  General: NAD.  CV: S1 and S2 normal. RRR.  Pulmonary: Clear to auscultation bilaterally, no wheezing, no rales.   Abdomen: Bowel sounds present and normal. Abdomen is soft, obese, and nontender   Extremities: No lower extremity edema.     Plan: 61 y.o. female with a CMH of HTN, CAD, HLD, DM, CKD stage IV, COPD, and chronic respiratory failure on 3 L oxygen admitted for ACS rule out. Pt to get cardiac cath tomorrow.                      FAMILY MEDICINE RESIDENCY SERVICE  DAILY PROGRESS NOTE    NAME: Yamileth Slater  : 1960  MRN: [id number]      LOS: 0 days     PROVIDER OF SERVICE: Ashley Coker MD    Chief Complaint: Chest pain    Subjective:     Interval History:  History taken from: patient  The patient states that she is doing well today.  She states that she is she does not have a chest pain today.  She states that she is aware that she is going to the Cath Lab tomorrow before she goes to dialysis.  She denies abdominal pain, nausea vomiting, problems with her bowel movements.     Review of Systems:   Review of Systems   Constitutional: Negative for fatigue.   Respiratory: Negative for shortness of breath.    Cardiovascular: Negative for chest pain and leg swelling.   Gastrointestinal: Negative for abdominal pain, constipation, diarrhea and nausea.   Endocrine: Negative for cold intolerance.   Skin: Negative for rash.   Neurological: Negative for weakness, light-headedness and numbness.        Objective:     Vital Signs  Temp:  [96.8 °F (36 °C)-97.4 °F (36.3 °C)] 97 °F (36.1 °C)  Heart Rate:  [] 77  Resp:  [16-18] 18  BP: ()/(43-67) 133/60  Flow (L/min):  [3] 3   Body mass index is 52.02 kg/m².    Physical Exam  Physical Exam  Vitals reviewed.   Constitutional:       Appearance: Normal appearance. She is obese.   HENT:      Head: Normocephalic and atraumatic.      Right Ear: Tympanic membrane normal.      Left Ear: Tympanic membrane normal.      Nose: Nose normal.      Mouth/Throat:      Mouth: Mucous membranes are moist.   Eyes:      Pupils: Pupils are equal, round, and reactive to light.   Cardiovascular:      Rate and Rhythm: Normal rate.      Pulses: Normal pulses.      Heart sounds: Normal heart sounds.   Pulmonary:      Effort: Pulmonary effort is normal.      Breath sounds: Normal breath sounds.      Comments: On nasal canula   Abdominal:      General: Abdomen is flat. Bowel sounds are normal.      Palpations: Abdomen is soft.   Musculoskeletal:         General: Normal range of motion.      Cervical back: Normal range of motion and neck supple.   Skin:     General: Skin is warm and dry.      Capillary Refill: Capillary refill takes less than 2 seconds.   Neurological:      General: No focal deficit present.      Mental Status: She is alert and oriented to person, place, and time. Mental status is at baseline.   Psychiatric:         Mood and Affect: Mood normal.         Scheduled Meds   aspirin, 81 mg, Oral, Daily  atorvastatin, 20 mg, Oral, Daily  bumetanide, 2 mg, Oral, Once per day on Sun Mon Wed Fri  cetirizine, 10 mg, Oral, Daily  clopidogrel, 75 mg, Oral, Daily  DULoxetine, 20 mg, Oral, Daily  ferrous sulfate, 324 mg, Oral, Daily With Breakfast  fluticasone, 2 spray, Each Nare, Daily  gabapentin, 400 mg, Oral, TID  heparin (porcine), 5,000 Units, Subcutaneous, Q12H  insulin aspart, 0-7 Units, Subcutaneous, TID AC  insulin aspart, 30 Units, Subcutaneous, TID With  Meals  insulin detemir, 50 Units, Subcutaneous, Nightly  ipratropium-albuterol, 3 mL, Nebulization, 4x Daily - RT  isosorbide mononitrate, 30 mg, Oral, QAM  levothyroxine, 25 mcg, Oral, Daily  lidocaine, 1 patch, Transdermal, Q24H  lisinopril, 10 mg, Oral, Daily  metoprolol tartrate, 50 mg, Oral, Q12H  montelukast, 10 mg, Oral, Nightly  nystatin, , Topical, TID  oxybutynin XL, 5 mg, Oral, Daily  pantoprazole, 40 mg, Oral, Nightly  ranolazine, 500 mg, Oral, Q12H  sennosides-docusate, 2 tablet, Oral, BID  sodium chloride, 10 mL, Intravenous, Q12H       PRN Meds   •  acetaminophen  •  albuterol  •  cyclobenzaprine  •  dextrose  •  dextrose  •  glucagon (human recombinant)  •  heparin (porcine)  •  heparin (porcine)  •  influenza vaccine  •  melatonin  •  Morphine **AND** naloxone  •  nitroglycerin  •  ondansetron  •  ondansetron ODT  •  promethazine  •  sodium chloride  •  sodium chloride      Diagnostic Data    Results from last 7 days   Lab Units 11/12/21  0531   WBC 10*3/mm3 7.06   HEMOGLOBIN g/dL 8.4*   HEMATOCRIT % 25.0*   PLATELETS 10*3/mm3 265   GLUCOSE mg/dL 391*   CREATININE mg/dL 3.49*   BUN mg/dL 44*   SODIUM mmol/L 127*   POTASSIUM mmol/L 4.9   AST (SGOT) U/L 11   ALT (SGPT) U/L 8   ALK PHOS U/L 145*   BILIRUBIN mg/dL <0.2   ANION GAP mmol/L 15.0       No radiology results for the last day      I reviewed the patient's new clinical results.    Assessment/Plan:     Active Hospital Problems    Diagnosis  POA   • **Chest pain [R07.9]  Yes     -S/P CABG x 3 (Primary), Bilateral carotid artery stenosis w/ 2 stents on left side,  PAD (peripheral artery disease) with stent in right upper leg, Morbid obesity   -Continue aspirin, Plavix, atorvastatin, lisinopril,  isosorbide mononitrate 60 mg daily     -EKG: No ST segment changes.   -Troponin negative  -NPO after midnight  -Cardio consult states that they would like to take the patient to Cath lab tomorrow before the patient goes to dialysis.     • Anemia [D64.9]   Unknown     -Anemia of chronic disease vs GI bleed  -No active bleeding on recent EGD/Colonoscopy  -Recent capsule endoscopy which showed few small nonbleeding intestinal AVMs and single small intestinal polyp which GI planned to follow outpatient  -Continue to monitor H/H  -Will transfuse if Hgb <8  -Continue home ferrous sulfate. May be a candidate for epogen and IV iron  -Follow stool occult test     • Type 2 diabetes mellitus with hyperglycemia (HCC) [E11.65]  Unknown     -Continue 20 units of levemir in the morning , 30 units of novolog with meals, and 50 units of levemir at night  -SSI for additional coverage     • Pneumonitis [J18.9]  Unknown   • Acute on chronic congestive heart failure (HCC) [I50.9]  Yes     - Continue Imdur, metoprolol, lisinopril      • CKD (chronic kidney disease), symptom management only, stage 5 (HCC) [N18.5]  Yes     Results from last 7 days   Lab Units 11/10/21  0538 11/09/21  0608 11/08/21  1450   CREATININE mg/dL 3.49* 3.16* 3.27*     -Completes outpatient HD on TTS  -Nephrology on board. Will require HD in house     • COPD (chronic obstructive pulmonary disease) ([facility]) [J44.9]  Yes     -O2 supplementation  -Home inhalers as needed  -DuoNebs  -CPAP at night          DVT prophylaxis: Heparin  Code status is   Code Status and Medical Interventions:   Ordered at: 11/08/21 1734     Code Status (Patient has no pulse and is not breathing):    CPR (Attempt to Resuscitate)     Medical Interventions (Patient has pulse or is breathing):    Full Support       Plan for disposition:Where: rehab      Time: 15 min          This document has been electronically signed by Ashley Coker MD on November 12, 2021 08:23 CST    Electronically signed by Alex Wells MD at 11/12/21 1221       Medical Student Notes (last 72 hours)  Notes from 11/12/21 0805 through 11/15/21 0805   No notes of this type exist for this encounter.            Consult Notes (last 72 hours)      Amor Rivera MD  at 11/13/21 2132      Consult Orders    1. Inpatient Endocrinology Consult [[id number]] ordered by Carline Coffey MD at 11/12/21 1133               CONSULT NOTE    Subjective Yamileth Slater is a 61 y.o. female who I am being consulted for  evaluation of  Type 2 Diabetes - Hyperglycemia       Consulting Provider  Dr. Coker    HPI    61 y o Female     T2DM more than 10 years    Complicated by CAD /CHF , ESRD , Retinopathy , Neuropathy     Poor Glycemic Control    Glucose readings inpatient 300 to 400 despite more than 1 unit per Kg of insulin and being NPO        Allergies   Allergen Reactions   • Adhesive Tape Hives   • Other      Bandaids, MRSA, SURECLOSE       Past Medical History:   Diagnosis Date   • Acute blood loss anemia 4/16/2017    Likely due to gastric oozing at this time. - Dr. Duarte (GI) was consulted and has now signed off, will follow up outpatient - pill colonoscopy showed AVMs - continue to monitor   • Anxiety    • Carotid artery stenosis    • Chronic obstructive lung disease (HCC)    • CKD (chronic kidney disease) stage 4, GFR 15-29 ml/min ([facility])    • Colonic polyp    • Coronary arteriosclerosis    • Diabetes mellitus ([facility])    • Diabetic neuropathy ([facility])    • Ear pain, right 10/18/2021    - canal trauma due to patient scratching and DMT2 - added cortisporin ear drops   • Elevated troponin 10/12/2021    -most likely from CKD -Trending down -Neg chest pain   • GERD (gastroesophageal reflux disease)    • GI bleed 5/13/2021    - GI will follow up outpatient - Protonix 40mg daily - Avoid medical DVT prophy and use mechanical at this time instead. - Continue to monitor - pill colonoscopy results showed AVMs   • History of transfusion    • Hypercholesterolemia    • Hypertension    • Hypomagnesemia 6/27/2021    Monitor and replace   • Morbid obesity ([facility])    • Nephrolithiasis    • Peripheral vascular disease ([facility])    • Sleep apnea    • Substance abuse ([facility])    • Vitamin D deficiency      Family  History   Problem Relation Age of Onset   • Heart disease Mother    • Heart disease Father    • Heart disease Sister    • Heart disease Brother      Social History     Tobacco Use   • Smoking status: Former Smoker     Packs/day: 0.25     Years: 46.00     Pack years: 11.50     Types: Cigarettes   • Smokeless tobacco: Never Used   • Tobacco comment: only smoking 5 a day - quit april 23 2021   Substance Use Topics   • Alcohol use: No   • Drug use: Not Currently     Types: LSD, Marijuana, Methamphetamines         Current Facility-Administered Medications:   •  acetaminophen (TYLENOL) tablet 650 mg, 650 mg, Oral, Q8H PRN, Carline Coffey MD, 650 mg at 11/10/21 2214  •  albumin human 25 % IV SOLN 12.5 g, 12.5 g, Intravenous, PRN, Ace Lauren MD  •  albuterol (PROVENTIL) nebulizer solution 0.083% 2.5 mg/3mL, 2.5 mg, Nebulization, Q4H PRN, Carline Coffey MD  •  aspirin EC tablet 81 mg, 81 mg, Oral, Daily, Carline Coffey MD, 81 mg at 11/12/21 0830  •  atorvastatin (LIPITOR) tablet 20 mg, 20 mg, Oral, Daily, Carline Coffey MD, 20 mg at 11/12/21 0831  •  bumetanide (BUMEX) tablet 2 mg, 2 mg, Oral, Once per day on Sun Mon Wed Fri, Carline Coffey MD, 2 mg at 11/12/21 0830  •  cetirizine (zyrTEC) tablet 10 mg, 10 mg, Oral, Daily, Carline Coffey MD, 10 mg at 11/12/21 0830  •  clopidogrel (PLAVIX) tablet 75 mg, 75 mg, Oral, Daily, Carline Coffey MD, 75 mg at 11/12/21 0830  •  cyclobenzaprine (FLEXERIL) tablet 10 mg, 10 mg, Oral, BID PRN, Carline Coffey MD  •  dextrose (D50W) (25 g/50 mL) IV injection 25 g, 25 g, Intravenous, Q15 Min PRN, Carline Coffey MD  •  dextrose (D50W) (25 g/50 mL) IV injection 25-50 mL, 25-50 mL, Intravenous, Q30 Min PRN, Amor Rivera MD  •  dextrose (GLUTOSE) oral gel 15 g, 15 g, Oral, Q15 Min PRN, Carline Coffey MD  •  DULoxetine (CYMBALTA) DR capsule 20 mg, 20 mg, Oral, Daily, Carline Coffey MD, 20 mg at 11/12/21 0830  •  ferrous sulfate EC tablet 324 mg,  324 mg, Oral, Daily With Breakfast, Carline Coffey MD, 324 mg at 11/12/21 0831  •  fluticasone (FLONASE) 50 MCG/ACT nasal spray 2 spray, 2 spray, Each Nare, Daily, Carline Coffey MD, 2 spray at 11/11/21 0802  •  gabapentin (NEURONTIN) capsule 400 mg, 400 mg, Oral, TID, Carline Coffey MD, 400 mg at 11/13/21 2048  •  glucagon (human recombinant) (GLUCAGEN DIAGNOSTIC) injection 1 mg, 1 mg, Subcutaneous, Q15 Min PRN, Carline Coffey MD  •  heparin (porcine) 5000 UNIT/ML injection 5,000 Units, 5,000 Units, Subcutaneous, Q12H, Carline Coffey MD, 5,000 Units at 11/13/21 2047  •  heparin (porcine) injection 2,000 Units, 2,000 Units, Intracatheter, PRN, Carline Coffey MD, 2,000 Units at 11/13/21 1757  •  heparin (porcine) injection 2,000 Units, 2,000 Units, Intracatheter, PRN, Carline Coffey MD, 2,000 Units at 11/13/21 1755  •  influenza vac split quad (FLUZONE,FLUARIX,AFLURIA,FLULAVAL) injection 0.5 mL, 0.5 mL, Intramuscular, During Hospitalization, Carline Coffey MD  •  insulin aspart (novoLOG) injection 8-40 Units, 8-40 Units, Subcutaneous, TID With Meals, Carline Coffey MD  •  insulin regular (HumuLIN R,NovoLIN R) 100 Units in sodium chloride 0.9 % 100 mL (1 Units/mL) infusion, 0-50 Units/hr, Intravenous, Titrated, Amor Rivera MD, Last Rate: 7 mL/hr at 11/13/21 2126, 7 Units/hr at 11/13/21 2126  •  ipratropium-albuterol (DUO-NEB) nebulizer solution 3 mL, 3 mL, Nebulization, 4x Daily - RT, Carline Coffey MD, 3 mL at 11/13/21 1927  •  isosorbide mononitrate (IMDUR) 24 hr tablet 30 mg, 30 mg, Oral, QAM, Carline Coffey MD, 30 mg at 11/12/21 0623  •  levothyroxine (SYNTHROID, LEVOTHROID) tablet 25 mcg, 25 mcg, Oral, Daily, Carline Coffey MD, 25 mcg at 11/12/21 0831  •  lidocaine (LIDODERM) 5 % 1 patch, 1 patch, Transdermal, Q24H, Carline Coffey MD, 1 patch at 11/12/21 0831  •  lisinopril (PRINIVIL,ZESTRIL) tablet 10 mg, 10 mg, Oral, Daily, Carline Coffey MD, 10 mg at  11/12/21 0830  •  melatonin tablet 1.5 mg, 1.5 mg, Oral, Nightly PRN, Carline Coffey MD  •  metoprolol tartrate (LOPRESSOR) tablet 50 mg, 50 mg, Oral, Q12H, Carline Coffey MD, 50 mg at 11/13/21 2047  •  montelukast (SINGULAIR) tablet 10 mg, 10 mg, Oral, Nightly, Carline Coffey MD, 10 mg at 11/13/21 2047  •  morphine injection 1 mg, 1 mg, Intravenous, Q4H PRN, 1 mg at 11/12/21 2124 **AND** naloxone (NARCAN) injection 0.4 mg, 0.4 mg, Intravenous, Q5 Min PRN, Carline Coffey MD  •  nitroglycerin (NITROSTAT) SL tablet 0.4 mg, 0.4 mg, Sublingual, Q5 Min PRN, Carline Coffey MD  •  nystatin (MYCOSTATIN) powder, , Topical, TID, Carline Coffey MD, Given at 11/12/21 1557  •  ondansetron (ZOFRAN) injection 4 mg, 4 mg, Intravenous, Q6H PRN, Carline Coffey MD  •  ondansetron ODT (ZOFRAN-ODT) disintegrating tablet 4 mg, 4 mg, Oral, Q8H PRN, Carline Coffey MD  •  oxybutynin XL (DITROPAN-XL) 24 hr tablet 5 mg, 5 mg, Oral, Daily, Carline Coffey MD, 5 mg at 11/12/21 0830  •  pantoprazole (PROTONIX) EC tablet 40 mg, 40 mg, Oral, Nightly, Carline Coffey MD, 40 mg at 11/13/21 2047  •  promethazine (PHENERGAN) tablet 25 mg, 25 mg, Oral, Q6H PRN, Carline Coffey MD  •  ranolazine (RANEXA) 12 hr tablet 500 mg, 500 mg, Oral, Q12H, Carline Coffey MD, 500 mg at 11/13/21 2047  •  sennosides-docusate (PERICOLACE) 8.6-50 MG per tablet 2 tablet, 2 tablet, Oral, BID, Carline Coffey MD, 2 tablet at 11/13/21 2047  •  sodium chloride 0.9 % flush 10 mL, 10 mL, Intravenous, PRN, Carline Coffey MD  •  sodium chloride 0.9 % flush 10 mL, 10 mL, Intravenous, Q12H, Carline Coffey MD, 10 mL at 11/12/21 2127  •  sodium chloride 0.9 % flush 10 mL, 10 mL, Intravenous, PRN, Carline Coffey MD  •  sodium chloride 0.9 % flush 30 mL, 30 mL, Intravenous, Once PRN, Amor Rivera MD  •  sodium chloride 0.9 % infusion, 75 mL/hr, Intravenous, Continuous, Carline Coffey MD, Stopped at 11/13/21 1756  •  sodium  chloride 0.9 % infusion, 30 mL/hr, Intravenous, Continuous PRN, Amor Rivera MD, Last Rate: 30 mL/hr at 11/13/21 1807, 30 mL/hr at 11/13/21 1807    No current facility-administered medications on file prior to encounter.     Current Outpatient Medications on File Prior to Encounter   Medication Sig Dispense Refill   • albuterol (PROVENTIL) (2.5 MG/3ML) 0.083% nebulizer solution Inhale the contents of 1 vial by nebulization Every 4 (Four) Hours As Needed for Wheezing. 75 mL 3   • albuterol sulfate  (90 Base) MCG/ACT inhaler Inhale 2 puffs Every 4 (Four) Hours As Needed for Wheezing. 18 g 1   • aspirin (aspirin) 81 MG EC tablet Take 1 tablet by mouth Daily. 60 tablet 5   • atorvastatin (LIPITOR) 20 MG tablet TAKE ONE TABLET BY MOUTH EVERY NIGHT AT BEDTIME 30 tablet 1   • cetirizine (zyrTEC) 10 MG tablet Take 1 tablet by mouth Daily. 30 tablet 3   • clopidogrel (PLAVIX) 75 MG tablet Take 1 tablet by mouth Daily. 30 tablet 5   • cyclobenzaprine (FLEXERIL) 10 MG tablet Take 1 tablet by mouth 2 (Two) Times a Day As Needed for Muscle Spasms. 60 tablet 5   • DULoxetine (CYMBALTA) 20 MG capsule TAKE 1 CAPSULE BY MOUTH DAILY. 30 capsule 2   • ferrous sulfate (FeroSul) 325 (65 FE) MG tablet Take 1 tablet by mouth Daily With Breakfast. 30 tablet 3   • gabapentin (NEURONTIN) 400 MG capsule Take 1 capsule by mouth 3 (Three) Times a Day. 90 capsule 0   • insulin aspart (novoLOG FLEXPEN) 100 UNIT/ML solution pen-injector sc pen Inject 30 Units under the skin into the appropriate area as directed 3 (Three) Times a Day With Meals. 30 mL 12   • insulin degludec (Tresiba FlexTouch) 100 UNIT/ML solution pen-injector injection Inject 50 Units under the skin into the appropriate area as directed Every Night. 15 mL 10   • ipratropium-albuterol (DUO-NEB) 0.5-2.5 mg/3 ml nebulizer Take 3 mL by nebulization 4 (Four) Times a Day.     • levothyroxine (SYNTHROID, LEVOTHROID) 25 MCG tablet Take 25 mcg by mouth Daily.     •  lidocaine (LIDODERM) 5 % APPLY TO THE AFFECTED AREA(S) TOPICALLY TWO TIMES A DAY. REMOVE NIGHTLY 30 patch 1   • lisinopril (PRINIVIL,ZESTRIL) 10 MG tablet Take 1 tablet by mouth Daily. 30 tablet 5   • metoprolol tartrate (LOPRESSOR) 50 MG tablet TAKE ONE TABLET BY MOUTH TWO TIMES A DAY. HOLD IF PULSE IS LESS THAN 60 60 tablet 1   • montelukast (SINGULAIR) 10 MG tablet Take 1 tablet by mouth Every Night. 30 tablet 5   • nitroglycerin (NITROSTAT) 0.4 MG SL tablet Place 0.4 mg under the tongue Every 5 (Five) Minutes As Needed for Chest Pain (x 3 doses).     • nystatin (MYCOSTATIN) 061536 UNIT/GM powder Apply  topically to the appropriate area as directed 3 (Three) Times a Day. 15 g 1   • ondansetron ODT (Zofran ODT) 4 MG disintegrating tablet Place 1 tablet on the tongue Every 8 (Eight) Hours As Needed for Nausea or Vomiting. 30 tablet 0   • oxybutynin XL (DITROPAN-XL) 5 MG 24 hr tablet Take 1 tablet by mouth Daily. 90 tablet 1   • pantoprazole (PROTONIX) 40 MG EC tablet Take 1 tablet by mouth Every Night. 30 tablet 5   • sennosides-docusate (PERICOLACE) 8.6-50 MG per tablet Take 2 tablets by mouth 2 (Two) Times a Day. 60 tablet 2   • sodium bicarbonate 650 MG tablet Take 1 tablet by mouth 2 (Two) Times a Day. (Patient taking differently: Take 2 tablets by mouth 2 (Two) Times a Day.) 60 tablet 1   • acetaminophen (Tylenol 8 Hour) 650 MG 8 hr tablet Take 1 tablet by mouth Every 8 (Eight) Hours As Needed for Mild Pain . 90 tablet 0   • Blood Glucose Monitoring Suppl ([employer] Blood Glucose Meter) w/Device kit 1 each 3 (Three) Times a Day. 1 kit 3   • bumetanide (BUMEX) 2 MG tablet Take 1 tablet by mouth 4 (Four) Times a Week. 30 tablet 0   • Continuous Blood Gluc  (FreeStyle Jade 2 [employer]) device 1 each Continuous. 1 each 0   • Continuous Blood Gluc Sensor (FreeStyle Jade 2 Sensor) misc 1 each Every 14 (Fourteen) Days. 2 each 11   • Dulaglutide (Trulicity) 0.75 MG/0.5ML solution pen-injector Inject 0.75 mg (1 pen)  under the skin into the appropriate area as directed 1 (One) Time Per Week. 2 mL 6   • EASY TOUCH PEN NEEDLES 31G X 8 MM misc      • glucose blood test strip Use as instructed 100 each 12   • Insulin Pen Needle (NovoFine) 30G X 8 MM misc As directed 4 times daily 100 each 11   • Insulin Pen Needle 31G X 8 MM misc Use to inject insulin 4 (Four) Times a Day as directed. 120 each 12   • isosorbide mononitrate (IMDUR) 60 MG 24 hr tablet Take 1 tablet by mouth Every Morning. (Patient taking differently: Take 0.5 tablets by mouth Every Morning. TAKING 30 MG ) 90 tablet 3   • promethazine (PHENERGAN) 25 MG tablet Take 25 mg by mouth Every 6 (Six) Hours As Needed.         Medications Discontinued During This Encounter   Medication Reason   • acetaminophen (TYLENOL) tablet 650 mg Duplicate order   • albuterol sulfate HFA (PROVENTIL HFA;VENTOLIN HFA;PROAIR HFA) inhaler 2 puff Duplicate order   • sodium bicarbonate tablet 650 mg    • bumetanide (BUMEX) tablet 2 mg    • insulin aspart (novoLOG) injection 30 Units    • insulin detemir (LEVEMIR) injection 50 Units    • insulin aspart (novoLOG) injection 34 Units    • insulin aspart (novoLOG) injection 0-7 Units    • insulin aspart (novoLOG) injection 3-15 Units    • insulin detemir (LEVEMIR) injection 60 Units    • insulin aspart (novoLOG) injection 6-30 Units    • insulin detemir (LEVEMIR) injection 35 Units    • iopamidol (ISOVUE-370) 76 % injection Patient Discharge   • hydrALAZINE (APRESOLINE) injection Patient Discharge   • Bivalirudin Trifluoroacetate (ANGIOMAX) 250 mg in sodium chloride 0.9 % 50 mL (5 mg/mL) infusion Patient Discharge   • ondansetron (ZOFRAN) injection Patient Discharge   • bivalirudin (ANGIOMAX) bolus from bag Patient Discharge   • lidocaine (XYLOCAINE) 2% injection Patient Discharge   • midazolam (VERSED) injection Patient Discharge   • fentaNYL citrate (PF) (SUBLIMAZE) injection Patient Discharge   • heparin 5000 units in sodium chloride 0.9% IV  infusion Patient Discharge   • heparin 1000 units in sodium chloride 0.9% IV infusion Patient Discharge   • sodium chloride 0.9 % flush 3 mL Patient Transfer   • sodium chloride 0.9 % flush 10 mL Patient Transfer   • insulin aspart (novoLOG) injection 3-15 Units    • insulin aspart (novoLOG) injection 3-15 Units    • insulin detemir (LEVEMIR) injection 60 Units    • heparin (porcine) injection 2,000 Units      Review of Systems   Constitutional: Positive for fatigue. Negative for activity change, appetite change, chills, diaphoresis, fever and unexpected weight change.   HENT: Negative for congestion, dental problem, drooling, ear discharge, ear pain, facial swelling, mouth sores, postnasal drip, rhinorrhea, sinus pressure, sore throat, tinnitus, trouble swallowing and voice change.    Eyes: Negative for photophobia, pain, discharge, redness, itching and visual disturbance.   Respiratory: Negative for apnea, cough, choking, chest tightness, shortness of breath, wheezing and stridor.    Cardiovascular: Negative for chest pain, palpitations and leg swelling.   Gastrointestinal: Negative for abdominal distention, abdominal pain, constipation, diarrhea, nausea and vomiting.   Endocrine: Negative for cold intolerance, heat intolerance, polydipsia, polyphagia and polyuria.   Genitourinary: Negative for decreased urine volume, difficulty urinating, dysuria, flank pain, frequency, hematuria and urgency.   Musculoskeletal: Positive for myalgias. Negative for arthralgias, back pain, gait problem, joint swelling, neck pain and neck stiffness.   Skin: Negative for color change, pallor, rash and wound.   Allergic/Immunologic: Negative for immunocompromised state.   Neurological: Negative for dizziness, tremors, seizures, syncope, facial asymmetry, speech difficulty, weakness, light-headedness, numbness and headaches.   Hematological: Negative for adenopathy.   Psychiatric/Behavioral: Negative for agitation, behavioral problems,  "confusion, decreased concentration, dysphoric mood, hallucinations, self-injury, sleep disturbance and suicidal ideas. The patient is not nervous/anxious and is not hyperactive.         Objective:   /63   Pulse 89   Temp 98 °F (36.7 °C) (Temporal)   Resp 17   Ht 157.5 cm (62\")   Wt 131 kg (288 lb 6.4 oz)   LMP  (LMP Unknown)   SpO2 99%   BMI 52.75 kg/m²     Physical Exam  Constitutional:       General: She is not in acute distress.     Appearance: Normal appearance. She is not ill-appearing, toxic-appearing or diaphoretic.      Comments: somnolent   HENT:      Head: Normocephalic and atraumatic.      Right Ear: External ear normal.      Left Ear: External ear normal.      Nose: Nose normal. No congestion or rhinorrhea.      Mouth/Throat:      Mouth: Mucous membranes are moist.   Eyes:      General: No scleral icterus.        Right eye: No discharge.         Left eye: No discharge.      Extraocular Movements: Extraocular movements intact.      Conjunctiva/sclera: Conjunctivae normal.   Neck:      Vascular: No carotid bruit.   Cardiovascular:      Rate and Rhythm: Normal rate and regular rhythm.      Pulses: Normal pulses.      Heart sounds: Normal heart sounds. No murmur heard.      Pulmonary:      Effort: Pulmonary effort is normal. No respiratory distress.      Breath sounds: Normal breath sounds. No stridor. No wheezing, rhonchi or rales.   Chest:      Chest wall: No tenderness.   Abdominal:      General: There is no distension.      Palpations: Abdomen is soft.   Musculoskeletal:         General: No swelling or tenderness. Normal range of motion.      Cervical back: Normal range of motion and neck supple. No rigidity. No muscular tenderness.   Lymphadenopathy:      Cervical: No cervical adenopathy.   Skin:     Coloration: Skin is not jaundiced or pale.      Findings: No bruising, erythema, lesion or rash.   Neurological:      General: No focal deficit present.      Mental Status: She is oriented to " person, place, and time.      Cranial Nerves: No cranial nerve deficit.      Motor: No weakness.   Psychiatric:         Mood and Affect: Mood normal.         Behavior: Behavior normal.         Thought Content: Thought content normal.         Judgment: Judgment normal.         Lab Review    Lab Results   Component Value Date    HGBA1C 8.80 (H) 10/26/2021       Lab Results   Component Value Date    GLUCOSE 214 (H) 11/13/2021    CALCIUM 9.0 11/13/2021     (L) 11/13/2021    K 4.0 11/13/2021    CO2 26.0 11/13/2021    CL 95 (L) 11/13/2021    BUN 21 11/13/2021    CREATININE 1.95 (H) 11/13/2021    EGFRIFAFRI  11/13/2021      Comment:      <15 Indicative of kidney failure.    EGFRIFNONA 26 (L) 11/13/2021    BCR 10.8 11/13/2021    ANIONGAP 12.0 11/13/2021     Lab Results   Component Value Date    GLUCOSE 214 (H) 11/13/2021    BUN 21 11/13/2021    CREATININE 1.95 (H) 11/13/2021    EGFRIFNONA 26 (L) 11/13/2021    EGFRIFAFRI  11/13/2021      Comment:      <15 Indicative of kidney failure.    BCR 10.8 11/13/2021    CO2 26.0 11/13/2021    CALCIUM 9.0 11/13/2021    ALBUMIN 3.10 (L) 11/13/2021    AST 10 11/13/2021    ALT 9 11/13/2021       Lab Results   Component Value Date    WBC 7.65 11/13/2021    HGB 8.0 (L) 11/13/2021    HCT 24.3 (L) 11/13/2021    MCV 81.9 11/13/2021     11/13/2021       Lab Results   Component Value Date    TSH 1.120 04/24/2021           Assessment/Plan     Type 2 Diabetes with hyperglycemia     I escalated insulin to 60 bid and novolog 8 units per carb and remained hyperglycemic    Taking advantage of ICU stay and importance of glycemic control aiming for glucose less  wilkerson 180, I instituted insulin Drip.     Based on overnight data, switch to basal , bolus insulin in am     She will benefit from GLP 1 even though limited data in ESRD    No data for sglt2 once ESRD has been reached so I won't use     ====    Addendum     From 10 pm to 6 am , she needed 20 units of insulin drip   So 60 of basal per  day should be enough   I will rx 50 units of levemir this am, stop pump and do 4 units per 15 grams of carb consumed and sliding scale of 3 per 50  We will keep diet to no more than 4 carbs per meal = 60 grams of carb per meal .                 Electronically signed by Amor Rivera MD at 11/14/21 1106

## 2021-11-15 NOTE — DISCHARGE SUMMARY
DISCHARGE SUMMARY    PATIENT NAME: Yamileth Slater         PHYSICIAN: Alvarado Mauricio MD  : 1960  MRN: [id number]    ADMITTED: 2021     DISCHARGED: 11/15/2021    ADMISSION DIAGNOSES:  Active Hospital Problems    Diagnosis  POA   • **Coronary artery disease [I25.10]  Yes   • Anemia [D64.9]  Yes   • Type 2 diabetes mellitus with hyperglycemia (HCC) [E11.65]  Yes   • Pneumonitis [J18.9]  Unknown   • Chronic diastolic congestive heart failure (HCC) [I50.32]  Yes   • CKD (chronic kidney disease), symptom management only, stage 5 (HCC) [N18.5]  Yes   • COPD (chronic obstructive pulmonary disease) (HCC) [J44.9]  Yes      Resolved Hospital Problems   No resolved problems to display.       DISCHARGE DIAGNOSES:   Active Hospital Problems    Diagnosis  POA   • **Coronary artery disease [I25.10]  Yes   • Anemia [D64.9]  Yes   • Type 2 diabetes mellitus with hyperglycemia (HCC) [E11.65]  Yes   • Pneumonitis [J18.9]  Unknown   • Chronic diastolic congestive heart failure (HCC) [I50.32]  Yes   • CKD (chronic kidney disease), symptom management only, stage 5 (HCC) [N18.5]  Yes   • COPD (chronic obstructive pulmonary disease) (HCC) [J44.9]  Yes      Resolved Hospital Problems   No resolved problems to display.       SERVICE: Family Medicine Res[facility]  Attending: Alex Wells MD  Resident: Alvarado Mauricio MD    CONSULTS:   Consult Orders (all) (From admission, onward)     Start     Ordered    21 1133  Inpatient Endocrinology Consult  Once        Specialty:  Endocrinology  Provider:  (Not yet assigned)    21 1133    21 1155  Inpatient Cardiology Consult  Once        Specialty:  Cardiology  Provider:  (Not yet assigned)    21 1157    21 1816  Inpatient Case Management  Consult  Once        Provider:  (Not yet assigned)    21 1815    21 1703  Inpatient Nephrology Consult  Once        Specialty:  Nephrology  Provider:  Ace Lauren MD    21 170                 PROCEDURES:   Nuclear stress test  Left heart catheterization  Hemodialysis    HISTORY OF PRESENT ILLNESS:     Retrieved from history and physical exam by Carline Coffey on 11/8/2021:    Yamileth Slater is a 61 y.o. female with a CMH of HTN, CAD, HLD, DM, CKD stage IV, COPD, and chronic respiratory failure on 3 L oxygen at home who presents complaining of chest pain. She was sitting in recliner at [facility] and started having sudden, intermittent, squeezing/pressure-like, substernal chest pain which radiated to her elbow. Deep breaths make it worse.      In the ED, patient was given Aspirin 324, nitro ointment for chest pain. 10 Units insulin were given for hyperglycemia. Chest pain resolved thereafter.     Of note, she was admitted from 10/11/21-10/28/21 for GI bleed foor which she required blood transfusion. She also had a capsule endoscopy which showed few small nonbleeding intestinal AVMs and single small intestinal polyp which GI planned to follow outpatient. She also receives outpatient HD on TTS. She was discharged to [facility]. She was admitted to  service for ACS rule out.    DIAGNOSTIC DATA:   Lab Results (last 24 hours)     Procedure Component Value Units Date/Time    COVID PRE-OP / PRE-PROCEDURE SCREENING ORDER (NO ISOLATION) - Swab, Nasopharynx [[id number]] Collected: 11/15/21 1102    Specimen: Swab from Nasopharynx Updated: 11/15/21 1102    Narrative:      The following orders were created for panel order COVID PRE-OP / PRE-PROCEDURE SCREENING ORDER (NO ISOLATION) - Swab, Nasopharynx.  Procedure                               Abnormality         Status                     ---------                               -----------         ------                     COVID-19MUMTAZ/SETH IN-...[[id number]]                      In process                   Please view results for these tests on the individual orders.    COVID-MUMTAZ Osei/SETH IN-HOUSE, NP SWAB IN TRANSPORT MEDIA 8-10 HR TAT - Swab,  Nasopharynx [[id number]] Collected: 11/15/21 1102    Specimen: Swab from Nasopharynx Updated: 11/15/21 1102    Comprehensive Metabolic Panel [[id number]]  (Abnormal) Collected: 11/15/21 0533    Specimen: Blood Updated: 11/15/21 0654     Glucose 212 mg/dL      BUN 36 mg/dL      Creatinine 4.35 mg/dL      Sodium 127 mmol/L      Potassium 4.6 mmol/L      Chloride 91 mmol/L      CO2 22.0 mmol/L      Calcium 9.0 mg/dL      Total Protein 7.7 g/dL      Albumin 3.30 g/dL      ALT (SGPT) 7 U/L      AST (SGOT) 15 U/L      Alkaline Phosphatase 121 U/L      Total Bilirubin 0.2 mg/dL      eGFR Non African Amer 10 mL/min/1.73      Comment: <15 Indicative of kidney failure.        eGFR   Amer --     Comment: <15 Indicative of kidney failure.        Globulin 4.4 gm/dL      A/G Ratio 0.8 g/dL      BUN/Creatinine Ratio 8.3     Anion Gap 14.0 mmol/L     Narrative:      GFR Normal >60  Chronic Kidney Disease <60  Kidney Failure <15      Iron Profile [[id number]]  (Abnormal) Collected: 11/15/21 0533    Specimen: Blood Updated: 11/15/21 0650     Iron 36 mcg/dL      Iron Saturation 14 %      Transferrin 171 mg/dL      TIBC 255 mcg/dL     Ferritin [[id number]]  (Abnormal) Collected: 11/15/21 0533    Specimen: Blood Updated: 11/15/21 0648     Ferritin 152.60 ng/mL     Narrative:      Results may be falsely decreased if patient taking Biotin.      POC Glucose Once [[id number]]  (Abnormal) Collected: 11/15/21 0615    Specimen: Blood Updated: 11/15/21 0635     Glucose 277 mg/dL      Comment: RN NotifiedOperator: [id number] DARIEN WIGGINSTiffany ID: [id number]       CBC & Differential [[id number]]  (Abnormal) Collected: 11/15/21 0533    Specimen: Blood Updated: 11/15/21 0609    Narrative:      The following orders were created for panel order CBC & Differential.  Procedure                               Abnormality         Status                     ---------                               -----------         ------                     CBC Auto  Differential[[id number]]        Abnormal            Final result                 Please view results for these tests on the individual orders.    CBC Auto Differential [[id number]]  (Abnormal) Collected: 11/15/21 0533    Specimen: Blood Updated: 11/15/21 0609     WBC 9.11 10*3/mm3      RBC 3.10 10*6/mm3      Hemoglobin 8.6 g/dL      Hematocrit 25.9 %      MCV 83.5 fL      MCH 27.7 pg      MCHC 33.2 g/dL      RDW 14.8 %      RDW-SD 44.6 fl      MPV 8.5 fL      Platelets 241 10*3/mm3      Neutrophil % 66.0 %      Lymphocyte % 20.2 %      Monocyte % 7.8 %      Eosinophil % 3.7 %      Basophil % 0.8 %      Immature Grans % 1.5 %      Neutrophils, Absolute 6.01 10*3/mm3      Lymphocytes, Absolute 1.84 10*3/mm3      Monocytes, Absolute 0.71 10*3/mm3      Eosinophils, Absolute 0.34 10*3/mm3      Basophils, Absolute 0.07 10*3/mm3      Immature Grans, Absolute 0.14 10*3/mm3      nRBC 0.0 /100 WBC     POC Glucose Once [[id number]]  (Abnormal) Collected: 11/14/21 1712    Specimen: Blood Updated: 11/14/21 1724     Glucose 135 mg/dL      Comment: RN NotifiedOperator: [id number] MARKS ALEXISMeter ID: [id number]       POC Glucose Once [[id number]]  (Abnormal) Collected: 11/13/21 0633    Specimen: Blood Updated: 11/14/21 1649     Glucose 387 mg/dL      Comment: RN NotifiedOperator: [id number] PATRICIA POLLOCKMeter ID: [id number]       POC Glucose Once [[id number]]  (Abnormal) Collected: 11/14/21 1142    Specimen: Blood Updated: 11/14/21 1217     Glucose 254 mg/dL      Comment: RN NotifiedOperator: [id number] DARIEN CLAIREMeter ID: [id number]       POC Glucose Once [[id number]]  (Abnormal) Collected: 11/14/21 0616    Specimen: Blood Updated: 11/14/21 1215     Glucose 151 mg/dL      Comment: RN NotifiedOperator: [id number] TRACE WIGGINSAHMeter ID: [id number]           Imaging Results (Last 72 Hours)     ** No results found for the last 72 hours. **             HOSPITAL COURSE:    The patient was admitted to the hospital for  ACS rule out.    ACS rule out:  ECG showed no ST segment changes.  Troponins were negative.  Cardiology was consulted and the patient received a nuclear stress test that was consistent with an intermediate risk study.  The patient then received a left heart catheterization with stenting of the left distal main and ostial circumflex arteries and thrombectomy of the left main coronary artery. The chest pain subsequently resolved.  She was started on Ranexa 500 mg twice daily by cardiology and her dose of Imdur was adjusted down to 30 mg daily prior to discharge.  Her other medicines were continued.    Diabetes:  Dr. Elijah Stephenson of endocrinology was consulted due to the patient's persistent hyperglycemia.  He started her on an insulin drip and then switched her to subcutaneous insulin and performed a carb count.  It was thought that the patient's insulin requirements were consistent with her known weight and that her persistent hyperglycemia is secondary to her persistent bad habit of eating foods that are contrary to what should be a diabetic consistent carbohydrate diet.  Dr. Stephenson asked to see her outpatient on Monday, November 22 at 11:30 AM and this appointment was set up.  In the meantime, the patient was to be continued on 60 mg of long-acting Levemir per Dr. Stephenson.    End-stage renal disease:  The patient has end-stage renal disease and has been on hemodialysis.  She received hemodialysis several times during her hospital stay.  Per discharge, nephrology documented no concern with hyponatremia in the setting of modulation with hemodialysis.  They recommended keeping vitamin B12 and folate supplementation.   Her dose of lisinopril was decreased from 10 mg to 5 mg by nephrology during her stay.  She will be following up outpatient with Dr. Lauren of nephrology.    Anemia:  The patient has a normocytic anemia consistent with her chronic disease state and end-stage renal function.  She did receive 2 units  of blood transfusion after her catheterization, due to hematoma.  After this, her hemoglobin remained stable. The patient will now begin getting IV iron and Epogen with hemodialysis in the outpatient setting.         DISCHARGE CONDITION:   Stable     DISPOSITION:  [place]-Health Care [facility]    DISCHARGE MEDICATIONS     Discharge Medications      New Medications      Instructions Start Date   epoetin hubert-epbx 09807 UNIT/ML injection  Commonly known as: RETACRIT   10,000 Units, Subcutaneous, 3 Times Weekly   Start Date: November 17, 2021     fluticasone 50 MCG/ACT nasal spray  Commonly known as: FLONASE   2 sprays, Each Nare, Daily   Start Date: November 16, 2021     insulin detemir 100 UNIT/ML injection  Commonly known as: LEVEMIR   60 Units, Subcutaneous, Daily   Start Date: November 16, 2021     ranolazine 500 MG 12 hr tablet  Commonly known as: RANEXA   500 mg, Oral, Every 12 Hours Scheduled         Changes to Medications      Instructions Start Date   isosorbide mononitrate 30 MG 24 hr tablet  Commonly known as: IMDUR  What changed:   · medication strength  · how much to take   30 mg, Oral, Every Morning      lisinopril 5 MG tablet  Commonly known as: SARAHZESTRIL  What changed:   · medication strength  · how much to take   5 mg, Oral, Daily   Start Date: November 16, 2021        Continue These Medications      Instructions Start Date   acetaminophen 650 MG 8 hr tablet  Commonly known as: Tylenol 8 Hour   650 mg, Oral, Every 8 Hours PRN      Adult Aspirin Regimen 81 MG EC tablet  Generic drug: aspirin   81 mg, Oral, Daily      albuterol sulfate  (90 Base) MCG/ACT inhaler  Commonly known as: PROVENTIL HFA;VENTOLIN HFA;PROAIR HFA   2 puffs, Inhalation, Every 4 Hours PRN      albuterol (2.5 MG/3ML) 0.083% nebulizer solution  Commonly known as: PROVENTIL   Inhale the contents of 1 vial by nebulization Every 4 (Four) Hours As Needed for Wheezing.      atorvastatin 20 MG tablet  Commonly known as: LIPITOR   TAKE  ONE TABLET BY MOUTH EVERY NIGHT AT BEDTIME      bumetanide 2 MG tablet  Commonly known as: BUMEX   2 mg, Oral, 4 Times Weekly      cetirizine 10 MG tablet  Commonly known as: zyrTEC   10 mg, Oral, Daily      clopidogrel 75 MG tablet  Commonly known as: PLAVIX   75 mg, Oral, Daily      [employer] Blood Glucose Meter w/Device kit   1 each, Does not apply, 3 Times Daily      cyclobenzaprine 10 MG tablet  Commonly known as: FLEXERIL   10 mg, Oral, 2 Times Daily PRN      DULoxetine 20 MG capsule  Commonly known as: CYMBALTA   20 mg, Oral, Daily      Easy Touch Pen Needles 31G X 8 MM misc  Generic drug: Insulin Pen Needle   No dose, route, or frequency recorded.      NovoFine 30G X 8 MM misc  Generic drug: Insulin Pen Needle   As directed 4 times daily      Insulin Pen Needle 31G X 8 MM misc   Use to inject insulin 4 (Four) Times a Day as directed.      ferrous sulfate 325 (65 FE) MG tablet  Commonly known as: FeroSul   325 mg, Oral, Daily With Breakfast      FreeStyle Jade 2 [place] device   1 each, Does not apply, Continuous      FreeStyle Jade 2 Sensor misc   1 each, Does not apply, Every 14 Days      gabapentin 400 MG capsule  Commonly known as: NEURONTIN   400 mg, Oral, 3 Times Daily      glucose blood test strip   Use as instructed      insulin aspart 100 UNIT/ML solution pen-injector sc pen  Commonly known as: novoLOG FLEXPEN   30 Units, Subcutaneous, 3 Times Daily With Meals      ipratropium-albuterol 0.5-2.5 mg/3 ml nebulizer  Commonly known as: DUO-NEB   3 mL, Nebulization, 4 Times Daily - RT      levothyroxine 25 MCG tablet  Commonly known as: SYNTHROID, LEVOTHROID   25 mcg, Oral, Daily      lidocaine 5 %  Commonly known as: LIDODERM   APPLY TO THE AFFECTED AREA(S) TOPICALLY TWO TIMES A DAY. REMOVE NIGHTLY      metoprolol tartrate 50 MG tablet  Commonly known as: LOPRESSOR   TAKE ONE TABLET BY MOUTH TWO TIMES A DAY. HOLD IF PULSE IS LESS THAN 60      montelukast 10 MG tablet  Commonly known as: SINGULAIR   10 mg,  Oral, Nightly      nitroglycerin 0.4 MG SL tablet  Commonly known as: NITROSTAT   0.4 mg, Sublingual, Every 5 Minutes PRN      nystatin 145742 UNIT/GM powder  Commonly known as: MYCOSTATIN   Topical, 3 Times Daily      ondansetron ODT 4 MG disintegrating tablet  Commonly known as: Zofran ODT   4 mg, Translingual, Every 8 Hours PRN      oxybutynin XL 5 MG 24 hr tablet  Commonly known as: DITROPAN-XL   5 mg, Oral, Daily      pantoprazole 40 MG EC tablet  Commonly known as: PROTONIX   40 mg, Oral, Nightly      promethazine 25 MG tablet  Commonly known as: PHENERGAN   25 mg, Oral, Every 6 Hours PRN      sennosides-docusate 8.6-50 MG per tablet  Commonly known as: PERICOLACE   2 tablets, Oral, 2 Times Daily         Stop These Medications    sodium bicarbonate 650 MG tablet     Tresiba FlexTouch 100 UNIT/ML solution pen-injector injection  Generic drug: insulin degludec     Trulicity 0.75 MG/0.5ML solution pen-injector  Generic drug: Dulaglutide            INSTRUCTIONS:  Activity:   Activity Instructions     Activity as Tolerated          Diet:   Diet Instructions     Diet: Consistent Carbohydrate      Discharge Diet: Consistent Carbohydrate          FOLLOW UP:   Additional Instructions for the Follow-ups that You Need to Schedule     Ambulatory Referral to Home Health   As directed      Face to Face Visit Date: 11/15/2021    Follow-up provider for Plan of Care?: I treated the patient in an acute care facility and will not continue treatment after discharge.    Follow-up provider: MAITE QUINN [[id number]]    Reason/Clinical Findings: Weight, deconditioning    Describe mobility limitations that make leaving home difficult: Weight, deconditioning    Nursing/Therapeutic Services Requested: Physical Therapy Occupational Therapy    PT orders: Other (add comment) (CHF Managment and Medication Education)    Frequency: 1 Week 1         Ambulatory Referral to Home Health   As directed      Face to Face Visit Date: 11/15/2021     Follow-up provider for Plan of Care?: I treated the patient in an acute care facility and will not continue treatment after discharge.    Follow-up provider: RIANNA QUINN [[id number]]    Reason/Clinical Findings: Strengthening/Weakness/ADL difficulty    Describe mobility limitations that make leaving home difficult: patient needs attention to her mobility and has been improving with PT/OT    Nursing/Therapeutic Services Requested: Physical Therapy Occupational Therapy    Occupational orders: Activities of daily living Strengthening    Frequency: 1 Week 1         Discharge Follow-up with PCP   As directed       Currently Documented PCP:    Rianna Quinn MD    PCP Phone Number:    [phone]     Follow Up Details: Within 1 week for post hospital visit         Discharge Follow-up with Specified Provider: Dr. Elijah Downs Endocrinology - Dr. Nava requested appointment on November Monday 22 11:30 am   As directed      To: Dr. Elijah Downs Endocrinology - Dr. Nava requested appointment on November Monday 22 11:30 am    Follow Up Details: Dr. Nava requested appointment on November Monday 22 11:30 am         Discharge Follow-up with Specified Provider: Dr. Lauren of Nephrology; 2 Weeks   As directed      To: Dr. Lauern of Nephrology    Follow Up: 2 Weeks    Follow Up Details: As previously scheduled for dialysis appointments         Discharge Follow-up with Specified Provider: Dr. Bautista of Cardiology; 1 Month   As directed      To: Dr. Bautista of Cardiology    Follow Up: 1 Month    Follow Up Details: Dr. Bautista of Cardiology For Hospitalization for stent placement            Contact information for follow-up providers     Margarito Davis MD. Schedule an appointment as soon as possible for a visit in 1 month(s).    Specialty: Cardiology  Why: 1 month hospital follow up, stent.   Contact information:  [address] [place] [address] [address]  [facility] [address] [address] [facility] [address] [phone]              Rianna Macias MD Follow up.    Specialty: Family Medicine  Why: Within 1 week for post hospital visit  Contact information:  [address] [place] [address] [phone]                   Contact information for after-discharge care     Home Medical Care     [facility] .    Service: Home Health Services  Contact information:  [address] [place] [address] [phone]                             PENDING TEST RESULTS AT DISCHARGE  [unfilled]    Time: 35 minutes was spent in discharge planning, medication reconciliation and coordination of care for this patient.    Alex Wells MD is the attending at time of discharge, He is aware of the patient's status and agrees with the above discharge summary.      This document has been electronically signed by Alvarado Mauricio MD on November 15, 2021 11:56 CST    Part of this note may be an electronic transcription or translation of spoken language to printed text using the Dragon Dictation System

## 2021-11-15 NOTE — PLAN OF CARE
Problem: Adult Inpatient Plan of Care  Goal: Plan of Care Review  Outcome: Ongoing, Progressing  Flowsheets  Taken 11/15/2021 0229 by Charlette Miranda RN  Progress: improving  Taken 11/14/2021 1631 by Toney Mantilla OT  Plan of Care Reviewed With: patient  Goal: Patient-Specific Goal (Individualized)  Outcome: Ongoing, Progressing  Goal: Absence of Hospital-Acquired Illness or Injury  Outcome: Ongoing, Progressing  Intervention: Identify and Manage Fall Risk  Recent Flowsheet Documentation  Taken 11/15/2021 0200 by Charlette Miranda RN  Safety Promotion/Fall Prevention: safety round/check completed  Taken 11/15/2021 0000 by Charlette Miranda RN  Safety Promotion/Fall Prevention: safety round/check completed  Taken 11/14/2021 2210 by Charlette Miranda RN  Safety Promotion/Fall Prevention: safety round/check completed  Taken 11/14/2021 2110 by Charlette Miranda RN  Safety Promotion/Fall Prevention: safety round/check completed  Taken 11/14/2021 2010 by Charlette Miranda RN  Safety Promotion/Fall Prevention: safety round/check completed  Taken 11/14/2021 1910 by Charlette Miranda RN  Safety Promotion/Fall Prevention: safety round/check completed  Intervention: Prevent Skin Injury  Recent Flowsheet Documentation  Taken 11/15/2021 0200 by Charlette Miranda RN  Body Position: tilted, left  Taken 11/15/2021 0000 by Charlette Miranda RN  Body Position: supine  Taken 11/14/2021 2210 by Charlette Miranda RN  Body Position: tilted, right  Taken 11/14/2021 2110 by Charlette Miranda RN  Body Position: tilted, left  Taken 11/14/2021 2010 by Charlette Miranda RN  Body Position: tilted, right  Taken 11/14/2021 1910 by Charlette Miranda RN  Body Position:   turned   weight shift assistance provided   supine  Intervention: Prevent and Manage VTE (venous thromboembolism) Risk  Recent Flowsheet Documentation  Taken 11/14/2021 1910 by Charlette Miranda RN  VTE Prevention/Management: (heparin) other (see  Medication was sent at 7:02am to [facility].   comments)  Intervention: Prevent Infection  Recent Flowsheet Documentation  Taken 11/14/2021 1910 by Charlette Miranda, RN  Infection Prevention: single patient room provided  Goal: Optimal Comfort and Wellbeing  Outcome: Ongoing, Progressing  Intervention: Provide Person-Centered Care  Recent Flowsheet Documentation  Taken 11/14/2021 1910 by Charlette Miranda, RN  Trust Relationship/Rapport: care explained  Goal: Readiness for Transition of Care  Outcome: Ongoing, Progressing   Goal Outcome Evaluation:           Progress: improving

## 2021-11-15 NOTE — PROGRESS NOTES
"  [facility] Progress Note   LOS: 3 days   Patient Care Team:  Rianna Macias MD as PCP - General (Family Medicine)    Chief Complaint:  Chest pain    Subjective     Interval History:   Patient Denies: shortness of air, chest pain, PND, orthopnea, palpitations, dizziness, syncope and edema  Patient Complaints: no complaints today  History taken from: patient    S/p Stenting over the weekend for CP by Dr. Rios. She has a good result.   Right groin site is WNL this AM. Soft and tender. Zac patch removed and bandaid applied. She was given groin instructions including lifting and soaking restrictions.   I asked her to remove the bandaid with bathing and to place a dry rag or towel in her fold for a couple days to keep it dry while it heals.   She will continue dialysis as outpatient for volume management.  She is chest pain free.     Objective     Vital Sign Min/Max for last 24 hours  Temp  Min: 97 °F (36.1 °C)  Max: 98.1 °F (36.7 °C)   BP  Min: 115/53  Max: 181/73   Pulse  Min: 59  Max: 78   Resp  Min: 18  Max: 20   SpO2  Min: 92 %  Max: 100 %   Flow (L/min)  Min: 3  Max: 3   Weight  Min: 135 kg (298 lb 8 oz)  Max: 135 kg (298 lb 8 oz)     Flowsheet Rows        First Filed Value   Admission Height 157.5 cm (62\") Documented at 11/08/2021 1434   Admission Weight 128 kg (282 lb) Documented at 11/08/2021 1434              11/13/21  0535 11/14/21  0500 11/15/21  0555   Weight: 131 kg (288 lb 6.4 oz) 129 kg (285 lb 7.9 oz) 135 kg (298 lb 8 oz)       Physical Exam:  Physical Exam  Vitals and nursing note reviewed.   Constitutional:       General: She is not in acute distress.     Appearance: She is well-developed. She is not diaphoretic.   HENT:      Head: Normocephalic.   Eyes:      Conjunctiva/sclera: Conjunctivae normal.   Neck:      Vascular: No JVD.   Cardiovascular:      Rate and Rhythm: Normal rate and regular rhythm.      Heart sounds: Normal heart sounds, S1 normal and S2 normal. No murmur heard.  No " friction rub. No gallop.    Pulmonary:      Effort: Pulmonary effort is normal. No respiratory distress.      Breath sounds: Normal breath sounds. No wheezing or rales.   Abdominal:      General: Bowel sounds are normal. There is no distension.      Palpations: Abdomen is soft.   Musculoskeletal:         General: Normal range of motion.   Skin:     General: Skin is warm and dry.      Findings: No erythema.   Neurological:      Mental Status: She is alert and oriented to person, place, and time.   Psychiatric:         Behavior: Behavior normal.         Thought Content: Thought content normal.         Judgment: Judgment normal.          Results Reviewed by myself:     Results from last 7 days   Lab Units 11/15/21  0533 11/14/21  0503 11/14/21  0041 11/13/21  1841 11/13/21  0536   SODIUM mmol/L 127* 135* 133*   < > 127*   POTASSIUM mmol/L 4.6 4.3 4.0   < > 4.5   CHLORIDE mmol/L 91* 96* 96*   < > 92*   CO2 mmol/L 22.0 26.0 28.0   < > 22.0   BUN mg/dL 36* 26* 23   < > 52*   CREATININE mg/dL 4.35* 2.77* 2.61*   < > 4.06*   CALCIUM mg/dL 9.0 9.1 9.2   < > 9.1   BILIRUBIN mg/dL 0.2 0.2  --   --  0.2   ALK PHOS U/L 121* 114  --   --  160*   ALT (SGPT) U/L 7 8  --   --  9   AST (SGOT) U/L 15 17  --   --  10   GLUCOSE mg/dL 212* 136* 92   < > 363*    < > = values in this interval not displayed.       Estimated Creatinine Clearance: 17.8 mL/min (A) (by C-G formula based on SCr of 4.35 mg/dL (H)).                Results from last 7 days   Lab Units 11/15/21  0533 11/14/21  1011 11/14/21  0503 11/14/21  0041 11/13/21  1733 11/13/21  0536 11/13/21  0536 11/12/21  0531 11/12/21  0531 11/11/21  0600 11/11/21  0600   WBC 10*3/mm3 9.11  --  7.56  --   --   --  7.65  --  7.06  --  8.15   HEMOGLOBIN g/dL 8.6* 7.0* 7.3* 7.5* 8.0*   < > 7.7*   < > 8.4*   < > 8.2*   PLATELETS 10*3/mm3 241  --  312  --   --   --  273  --  265  --  267    < > = values in this interval not displayed.       Results from last 7 days   Lab Units 11/13/21  0536    INR  0.99     Lab Results   Component Value Date    PROBNP 1,386.0 (H) 11/08/2021       I/O last 3 completed shifts:  In: 2625.8 [P.O.:1410; I.V.:393; Blood:822.8]  Out: 1350 [Urine:1350]    Cardiographics:  ECG/EMG Results (last 24 hours)       ** No results found for the last 24 hours. **            Results for orders placed during the hospital encounter of 10/11/21    Adult Transthoracic Echo Limited W/ Cont if Necessary Per Protocol    Interpretation Summary  · The study is technically difficult for diagnosis with poor acoustic windows. The quality of the study is limited due to patient body habitus and lung disease. Verbal consent was obtained from the patient to use Definity image enhancer in order to optimize the study.  · Left ventricular wall thickness is consistent with mild concentric hypertrophy.  · Estimated left ventricular EF = 63% Left ventricular ejection fraction appears to be 61 - 65%. Left ventricular systolic function is normal.  · The right ventricular cavity is mildly dilated. LA mildly dilated      XR Chest 1 View    Result Date: 11/8/2021  Interval development of right lower lobe infiltrative changes indicating pneumonitis. Electronically signed by:  Hugo Russo MD  11/8/2021 3:32 PM CST Workstation: [id number]    XR Chest 1 View    Result Date: 10/16/2021  CONCLUSION: Sternotomy. Stable cardiomegaly. Persistent minimal pulmonary vascular congestion and interstitial edema. Perhaps very small right pleural effusion. [id number] Electronically signed by:  Jon Patricia MD  10/16/2021 12:14 PM CDT Workstation: [email or web address]    XR Chest Post CVA Port    Result Date: 10/21/2021  Limited study technically. Right IJ catheter in satisfactory position. No pneumothorax. Possible mild fluid overload/CHF. Clinical correlation recommended. Electronically signed by:  Jesse Carr MD  10/21/2021 3:11 PM CDT Workstation: [id number]    [facility]   Impression   A.  Distal left main and ostial circumflex artery stenosis  with evidence of haziness.  B.  Patent LIMA to the LAD and a patent saphenous vein graft to the ramus intermedius branch.  C.  100% occluded right coronary artery with an occluded saphenous vein graft to the right coronary artery.     Recommendations: Due to the patient symptoms of chest pain with distal left main and ostial circumflex artery stenosis plan was to consider percutaneous intervention to the distal left main and ostial circumflex artery stenosis.  Discussed with Dr. Robles the coronary artery finding.  Dr. Robles reviewed the films.  Patient is not a candidate for a redo single-vessel coronary artery bypass grafting.  Plan was to proceed with percutaneous intervention.            Pronto thrombectomy and PTCA and stenting of the distal left main and ostial circumflex artery operative Report     Is a continuation of the left heart catheterization after the administration of Angiomax bolus and drip the procedure was continued when the ACT was greater than 300 ms.  Using a 6 Romanian left Vito guiding catheter selective cannulation were done of the left coronary artery which confirmed the stenosis.  A Choice PT wire was advanced into the circumflex artery.     A Pronto thrombectomy catheter was advanced and 2 passes were made.  There was difficulty encountered in advancing the Pronto thrombectomy catheter in the circumflex system.     The Pronto thrombectomy catheter was retrieved out of the body and using a 3.0 mm x 15 mm balloon was then advanced and predilatation was done.  The balloons were deflated pulled back into the guiding catheter and a 3.25 mm x 15 mm Xience Gloria drug-eluting stent was then successfully deployed with reduction of stenosis from 95% to less than 0% stenosis.  The stent was postdilated with the 3.5 mm balloon.     The wire was pulled back into the guiding catheter and final angiogram was performed which confirmed above finding.     Final impression: Successful Pronto  thrombectomy and PTCA and stenting of the distal left main and ostial circumflex artery was done with deployment of four 3.25 mm x 15 mm Xience Gloria drug-eluting stent with reduction of stenosis from 95% to less than 0% stenosis with good BEATRIZ-3 flow.     Patient ACT was 346 ms.  And iliofemoral arteriogram was done.  Angiomax infusion was stopped.  Plan was to consider Angio-Seal when the ACT decreases to under 200.  Patient arterial sheath accidentally after checking the ACT was pulled back with patient having hematoma.  Manual compression and a FemoStop was placed.  Patient had a Zac patch with manual compression and patient was transferred to the intensive care unit.     We will check an H&H.  Patient would have frequent groin examination.  ICU nurse was informed to follow the patient closely.         Medication Review:     Current Facility-Administered Medications:     acetaminophen (TYLENOL) tablet 650 mg, 650 mg, Oral, Q8H PRN, Alvarado Mauricio MD, 650 mg at 11/14/21 1146    albuterol (PROVENTIL) nebulizer solution 0.083% 2.5 mg/3mL, 2.5 mg, Nebulization, Q4H PRN, Alvarado Mauricio MD    aspirin EC tablet 81 mg, 81 mg, Oral, Daily, Alvarado Mauricio MD, 81 mg at 11/15/21 0836    atorvastatin (LIPITOR) tablet 20 mg, 20 mg, Oral, Daily, Alvarado Mauricio MD, 20 mg at 11/15/21 0836    bumetanide (BUMEX) tablet 2 mg, 2 mg, Oral, Once per day on Sun Mon Wed Fri, Alvarado Mauricio MD, 2 mg at 11/15/21 0835    cetirizine (zyrTEC) tablet 10 mg, 10 mg, Oral, Daily, Alvarado Mauricio MD, 10 mg at 11/15/21 0836    clopidogrel (PLAVIX) tablet 75 mg, 75 mg, Oral, Daily, Alvarado Mauricio MD, 75 mg at 11/15/21 0836    cyclobenzaprine (FLEXERIL) tablet 10 mg, 10 mg, Oral, BID PRN, Alvarado Mauricio MD    dextrose (D50W) (25 g/50 mL) IV injection 25 g, 25 g, Intravenous, Q15 Min PRN, Alvarado Mauricio MD    dextrose (GLUTOSE) oral gel 15 g, 15 g, Oral, Q15 Min PRN, Alvarado Mauricio MD    DULoxetine (CYMBALTA) DR capsule 20 mg,  20 mg, Oral, Daily, Alvarado Mauricio MD, 20 mg at 11/15/21 0836    [START ON 11/16/2021] epoetin hubert-epbx (RETACRIT) injection 10,000 Units, 10,000 Units, Intravenous, Once per day on Tue Thu Sat, Alvarado Muaricio MD    ferrous sulfate EC tablet 324 mg, 324 mg, Oral, Daily With Breakfast, Alvarado Mauricio MD, 324 mg at 11/15/21 0836    fluticasone (FLONASE) 50 MCG/ACT nasal spray 2 spray, 2 spray, Each Nare, Daily, Alvarado Mauricio MD, 2 spray at 11/11/21 0802    gabapentin (NEURONTIN) capsule 400 mg, 400 mg, Oral, TID, Alvarado Mauricio MD, 400 mg at 11/15/21 0836    glucagon (human recombinant) (GLUCAGEN DIAGNOSTIC) injection 1 mg, 1 mg, Subcutaneous, Q15 Min PRN, Alvarado Mauricio MD    heparin (porcine) 5000 UNIT/ML injection 5,000 Units, 5,000 Units, Subcutaneous, Q12H, Alvarado Mauricio MD, 5,000 Units at 11/15/21 0836    heparin (porcine) injection 2,000 Units, 2,000 Units, Intracatheter, PRN, Alvarado Mauricio MD, 2,000 Units at 11/13/21 1757    heparin (porcine) injection 2,000 Units, 2,000 Units, Intracatheter, PRN, Alvaraod Mauricio MD, 2,000 Units at 11/13/21 1755    influenza vac split quad (FLUZONE,FLUARIX,AFLURIA,FLULAVAL) injection 0.5 mL, 0.5 mL, Intramuscular, During Hospitalization, Alvarado Mauricio MD    insulin aspart (novoLOG) injection 3-12 Units, 3-12 Units, Subcutaneous, 4x Daily AC & at Bedtime, Alvarado Mauricio MD, 6 Units at 11/15/21 0837    insulin aspart (novoLOG) injection 3-12 Units, 3-12 Units, Subcutaneous, Q24H, Alvarado Mauricio MD    insulin aspart (novoLOG) injection 6-24 Units, 6-24 Units, Subcutaneous, TID With Meals, Amor Rivera MD    insulin detemir (LEVEMIR) injection 10 Units, 10 Units, Subcutaneous, Once, Amor Rivera MD    [START ON 11/16/2021] insulin detemir (LEVEMIR) injection 60 Units, 60 Units, Subcutaneous, Daily, Amor Rivera MD    ipratropium-albuterol (DUO-NEB) nebulizer solution 3 mL, 3 mL, Nebulization, 4x Daily - RT,  Alvarado Mauricio MD, 3 mL at 11/15/21 0709    isosorbide mononitrate (IMDUR) 24 hr tablet 30 mg, 30 mg, Oral, QAM, Alvarado Mauricio MD, 30 mg at 11/15/21 0553    levothyroxine (SYNTHROID, LEVOTHROID) tablet 25 mcg, 25 mcg, Oral, Daily, Alvarado Mauricio MD, 25 mcg at 11/15/21 0836    lidocaine (LIDODERM) 5 % 1 patch, 1 patch, Transdermal, Q24H, Alvarado Mauricio MD, 1 patch at 11/15/21 0835    lisinopril (PRINIVIL,ZESTRIL) tablet 5 mg, 5 mg, Oral, Daily, Alvarado Mauricio MD, 5 mg at 11/15/21 0836    melatonin tablet 1.5 mg, 1.5 mg, Oral, Nightly PRN, Alvarado Mauricio MD, 1.5 mg at 11/14/21 2104    metoprolol tartrate (LOPRESSOR) tablet 50 mg, 50 mg, Oral, Q12H, Alvarado Mauricio MD, 50 mg at 11/15/21 0836    montelukast (SINGULAIR) tablet 10 mg, 10 mg, Oral, Nightly, Alvarado Mauricio MD, 10 mg at 11/14/21 2104    morphine injection 4 mg, 4 mg, Intravenous, Q4H PRN, 4 mg at 11/14/21 1433 **AND** naloxone (NARCAN) injection 0.4 mg, 0.4 mg, Intravenous, Q5 Min PRN, Alvarado Mauricio MD    nitroglycerin (NITROSTAT) SL tablet 0.4 mg, 0.4 mg, Sublingual, Q5 Min PRN, Alvarado Mauricio MD    nystatin (MYCOSTATIN) powder, , Topical, TID, Alvarado Mauricio MD, Given at 11/15/21 0835    ondansetron (ZOFRAN) injection 4 mg, 4 mg, Intravenous, Q6H PRN, Alvarado Mauricio MD    ondansetron ODT (ZOFRAN-ODT) disintegrating tablet 4 mg, 4 mg, Oral, Q8H PRN, Alvarado Mauricio MD    oxybutynin XL (DITROPAN-XL) 24 hr tablet 5 mg, 5 mg, Oral, Daily, Alvarado Mauricio MD, 5 mg at 11/15/21 0836    pantoprazole (PROTONIX) EC tablet 40 mg, 40 mg, Oral, Nightly, Alvarado Mauricio MD, 40 mg at 11/14/21 2104    promethazine (PHENERGAN) tablet 25 mg, 25 mg, Oral, Q6H PRN, Alvarado Mauricio MD    ranolazine (RANEXA) 12 hr tablet 500 mg, 500 mg, Oral, Q12H, Alvarado Mauricio MD, 500 mg at 11/15/21 0836    sennosides-docusate (PERICOLACE) 8.6-50 MG per tablet 2 tablet, 2 tablet, Oral, BID, Alvarado Mauricio MD, 2 tablet at 11/15/21 0836    sodium chloride 0.9 %  flush 10 mL, 10 mL, Intravenous, PRN, Alvarado Mauricio MD    sodium chloride 0.9 % flush 10 mL, 10 mL, Intravenous, Q12H, Alvarado Mauricio MD, 10 mL at 11/14/21 0902    sodium chloride 0.9 % flush 10 mL, 10 mL, Intravenous, PRN, Alvarado Mauricio MD    sodium chloride 0.9 % infusion, 75 mL/hr, Intravenous, Continuous, Alvarado Mauricio MD, Last Rate: 75 mL/hr at 11/14/21 1454, 75 mL/hr at 11/14/21 1454    Patient's recent labs and results as included in the subjective data were reviewed by me. Any pertinent outside data will be included.        Assessment/Plan       Coronary artery disease  Successful Pronto thrombectomy and PTCA and stenting of the distal left main and ostial circumflex artery was done with deployment of four 3.25 mm x 15 mm Xience Gloria drug-eluting stent with reduction of stenosis from 95% to less than 0% stenosis with good BEATRIZ-3 flow.    Continue current medications.    Antiplatelet therapy: Plavix 75mg daily    Beta-blocker:  Lopressor 50mg BID    Statin: Lipitor 20mg nightly    Aspirin: 81mg daily    Nitrate: Imdur 30mg daily and Ranexa 500mg BID        CKD (chronic kidney disease), symptom management only, stage 5 (HCC)   - ESRD on Dialysis for volume removal with associated HFpEF        Plan for disposition:Where: home When:  today. Follow up with Dr. Handy in 1 month.             This document has been electronically signed by BRIDGETT Waters on November 15, 2021 09:33 CST      Electronically signed by BRIDGETT Waters, 11/15/21, 9:33 AM CST.    History and events reviewed in detail.  Patient examined and chart reviewed.  Agree with assessment and plan as outlined by BRIDGETT Nogueira.  Patient underwent PCI to distal left main/ostial/proximal left circumflex coronary artery by Dr. Rios on Saturday with placement of a 3.25 x 15 mm Xience Gloria stent.  Reduction stenosis to 0%.  Patient did develop a hematoma postprocedure with this appears to have improved  considerably/resolved.  Patient denied any chest pain/dyspnea.  Heart rate 66 bpm regular blood pressure 150/73 mmHg  Mild pallor with no icterus or cyanosis  Exam the heart showed normal first and second heart sounds with no S3 or S4.  No heart murmurs.  Lung exam showed increased AP diameter of the chest.  Breath sounds diminished and bases.  No rales or rhonchi.  Abdomen obese.  No tenderness    Labs reviewed.  Sodium 127, potassium 4.6, BUN 36 and creatinine 4.35.  Hemoglobin 8.6.    Continue current management with antiplatelet therapy with aspirin and Plavix, beta blockade with Lopressor 50 mg twice daily, statin therapy with Lipitor 20 mg daily and antianginal therapy with isosorbide mononitrate and Ranexa.    We will follow-up in the office in 1 month.    Electronically signed by Margarito Davis MD, 11/15/21, 1:06 PM CST.

## 2021-11-15 NOTE — PLAN OF CARE
Problem: Adult Inpatient Plan of Care  Goal: Plan of Care Review  Recent Flowsheet Documentation  Taken 11/15/2021 0715 by Rehka Perez COTA  Progress: improving  Outcome Summary: Pt supine in bed upon entry. Pt had incontinent bladder episode. Pt sup to sit, sit to stand, and t/f to toilet x3 with Supervision. Pt completed grooming with Supervision. Pt completed UB bathing with Supervision, LB bathing and dressing with Dependence. Pt t/f to bschai. All needs in reach.   Goal Outcome Evaluation:           Progress: improving  Outcome Summary: Pt supine in bed upon entry. Pt had incontinent bladder episode. Pt sup to sit, sit to stand, and t/f to toilet x3 with Supervision. Pt completed grooming with Supervision. Pt completed UB bathing with Supervision, LB bathing and dressing with Dependence. Pt t/f to bschai. All needs in reach.

## 2021-11-16 ENCOUNTER — TRANSITIONAL CARE MANAGEMENT TELEPHONE ENCOUNTER (OUTPATIENT)
Dept: CALL CENTER | Facility: HOSPITAL | Age: 61
End: 2021-11-16

## 2021-11-16 LAB
BH BB BLOOD EXPIRATION DATE: NORMAL
BH BB BLOOD EXPIRATION DATE: NORMAL
BH BB BLOOD TYPE BARCODE: 5100
BH BB BLOOD TYPE BARCODE: NORMAL
BH BB DISPENSE STATUS: NORMAL
BH BB DISPENSE STATUS: NORMAL
BH BB PRODUCT CODE: NORMAL
BH BB PRODUCT CODE: NORMAL
BH BB UNIT NUMBER: NORMAL
BH BB UNIT NUMBER: NORMAL
CROSSMATCH INTERPRETATION: NORMAL
CROSSMATCH INTERPRETATION: NORMAL
GLUCOSE BLDC GLUCOMTR-MCNC: 441 MG/DL (ref 70–130)
UNIT  ABO: NORMAL
UNIT  ABO: NORMAL
UNIT  RH: NORMAL
UNIT  RH: NORMAL

## 2021-11-16 NOTE — OUTREACH NOTE
Call Center TCM Note      Responses   [facility] [place], [facility] by [facility] patient discharged from? [place]   Does the patient have one of the following disease processes/diagnoses(primary or secondary)? Other   TCM attempt successful? No  [verbal release on file]   Unsuccessful attempts Attempt 1  [attempted both patient and ]          Roma Lopez RN    11/16/2021, 14:02 EST

## 2021-11-16 NOTE — OUTREACH NOTE
Call Center TCM Note      Responses   [facility] patient discharged from? [place]   Does the patient have one of the following disease processes/diagnoses(primary or secondary)? Other   TCM attempt successful? No   Unsuccessful attempts Attempt 2          Roma Lopez RN    11/16/2021, 16:04 EST

## 2021-11-16 NOTE — PAYOR COMM NOTE
"Komal Rodarte  Case Management Extender  [phone] phone  [phone] fax    Auth# [id number]    Yamileth Slater (61 y.o. 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                 Active Insurance as of 11/8/2021     Primary Coverage     Payor Plan Insurance Group Employer/Plan Group    HUMANA MEDICARE REPLACEMENT HUMANA MEDICARE REPLACEMENT [id number]     Payor Plan Address Payor Plan Phone Number Payor Plan Fax Number Effective Dates    [address] [address] [phone]  7/1/2020 - None Entered    [facility] [address]       Subscriber Name Subscriber Birth Date Member ID       YAMILETH SLATER 1960 [id number]           Secondary Coverage     Payor Plan Insurance Group Employer/Plan Group    [place]      Payor Plan Address Payor Plan Phone Number Payor Plan Fax Number Effective Dates    [address] [phone]  6/28/2019 - None Entered    [facility] [address]       Subscriber Name Subscriber Birth Date Member ID       YAMILETH SLATER 1960 [id number]                 Emergency Contacts      (Rel.) Home Phone Work Phone Mobile Phone    Huy Slater (Spouse) [phone] -- " [phone]    Yamileth Chavez (Daughter) [phone] -- [phone]    Suzanne Rivera (Daughter) [phone] -- [phone]               Discharge Summary      Alvarado Mauricio MD at 11/15/21 1317              DISCHARGE SUMMARY    PATIENT NAME: Yamileth Slater         PHYSICIAN: Alvarado Mauricio MD  : 1960  MRN: [id number]    ADMITTED: 2021     DISCHARGED: 11/15/2021    ADMISSION DIAGNOSES:  Active Hospital Problems    Diagnosis  POA   • **Coronary artery disease [I25.10]  Yes   • Anemia [D64.9]  Yes   • Type 2 diabetes mellitus with hyperglycemia (HCC) [E11.65]  Yes   • Pneumonitis [J18.9]  Unknown   • Chronic diastolic congestive heart failure (HCC) [I50.32]  Yes   • CKD (chronic kidney disease), symptom management only, stage 5 (HCC) [N18.5]  Yes   • COPD (chronic obstructive pulmonary disease) (HCC) [J44.9]  Yes      Resolved Hospital Problems   No resolved problems to display.       DISCHARGE DIAGNOSES:   Active Hospital Problems    Diagnosis  POA   • **Coronary artery disease [I25.10]  Yes   • Anemia [D64.9]  Yes   • Type 2 diabetes mellitus with hyperglycemia (HCC) [E11.65]  Yes   • Pneumonitis [J18.9]  Unknown   • Chronic diastolic congestive heart failure (HCC) [I50.32]  Yes   • CKD (chronic kidney disease), symptom management only, stage 5 (HCC) [N18.5]  Yes   • COPD (chronic obstructive pulmonary disease) (HCC) [J44.9]  Yes      Resolved Hospital Problems   No resolved problems to display.       SERVICE: Family Medicine Residency  Attending: Alex Wells MD  Resident: Alvarado Mauricio MD    CONSULTS:   Consult Orders (all) (From admission, onward)     Start     Ordered    21 1133  Inpatient Endocrinology Consult  Once        Specialty:  Endocrinology  Provider:  (Not yet assigned)    21 1133    21 1155  Inpatient Cardiology Consult  Once        Specialty:  Cardiology  Provider:  (Not yet assigned)    21 1157    21 1816  Inpatient Case Management Social  Services Consult  Once        Provider:  (Not yet assigned)    11/08/21 1815    11/08/21 1703  Inpatient Nephrology Consult  Once        Specialty:  Nephrology  Provider:  Ace Lauren MD    11/08/21 1702                PROCEDURES:   Nuclear stress test  Left heart catheterization  Hemodialysis    HISTORY OF PRESENT ILLNESS:     Retrieved from history and physical exam by Carline Coffey on 11/8/2021:    Yamileth Slater is a 61 y.o. female with a CMH of HTN, CAD, HLD, DM, CKD stage IV, COPD, and chronic respiratory failure on 3 L oxygen at home who presents complaining of chest pain. She was sitting in recliner at [facility] and started having sudden, intermittent, squeezing/pressure-like, substernal chest pain which radiated to her elbow. Deep breaths make it worse.      In the ED, patient was given Aspirin 324, nitro ointment for chest pain. 10 Units insulin were given for hyperglycemia. Chest pain resolved thereafter.     Of note, she was admitted from 10/11/21-10/28/21 for GI bleed foor which she required blood transfusion. She also had a capsule endoscopy which showed few small nonbleeding intestinal AVMs and single small intestinal polyp which GI planned to follow outpatient. She also receives outpatient HD on TTS. She was discharged to [facility]. She was admitted to  service for ACS rule out.    DIAGNOSTIC DATA:   Lab Results (last 24 hours)     Procedure Component Value Units Date/Time    COVID PRE-OP / PRE-PROCEDURE SCREENING ORDER (NO ISOLATION) - Swab, Nasopharynx [[id number]] Collected: 11/15/21 1102    Specimen: Swab from Nasopharynx Updated: 11/15/21 1102    Narrative:      The following orders were created for panel order COVID PRE-OP / PRE-PROCEDURE SCREENING ORDER (NO ISOLATION) - Swab, Nasopharynx.  Procedure                               Abnormality         Status                     ---------                               -----------         ------                     COVID-19, [facility]  IN-...[[id number]]                      In process                   Please view results for these tests on the individual orders.    COVID-19, [facility] IN-HOUSE, NP SWAB IN TRANSPORT MEDIA 8-10 HR TAT - Swab, Nasopharynx [[id number]] Collected: 11/15/21 1102    Specimen: Swab from Nasopharynx Updated: 11/15/21 1102    Comprehensive Metabolic Panel [[id number]]  (Abnormal) Collected: 11/15/21 0533    Specimen: Blood Updated: 11/15/21 0654     Glucose 212 mg/dL      BUN 36 mg/dL      Creatinine 4.35 mg/dL      Sodium 127 mmol/L      Potassium 4.6 mmol/L      Chloride 91 mmol/L      CO2 22.0 mmol/L      Calcium 9.0 mg/dL      Total Protein 7.7 g/dL      Albumin 3.30 g/dL      ALT (SGPT) 7 U/L      AST (SGOT) 15 U/L      Alkaline Phosphatase 121 U/L      Total Bilirubin 0.2 mg/dL      eGFR Non African Amer 10 mL/min/1.73      Comment: <15 Indicative of kidney failure.        eGFR   Amer --     Comment: <15 Indicative of kidney failure.        Globulin 4.4 gm/dL      A/G Ratio 0.8 g/dL      BUN/Creatinine Ratio 8.3     Anion Gap 14.0 mmol/L     Narrative:      GFR Normal >60  Chronic Kidney Disease <60  Kidney Failure <15      Iron Profile [[id number]]  (Abnormal) Collected: 11/15/21 0533    Specimen: Blood Updated: 11/15/21 0650     Iron 36 mcg/dL      Iron Saturation 14 %      Transferrin 171 mg/dL      TIBC 255 mcg/dL     Ferritin [[id number]]  (Abnormal) Collected: 11/15/21 0533    Specimen: Blood Updated: 11/15/21 0648     Ferritin 152.60 ng/mL     Narrative:      Results may be falsely decreased if patient taking Biotin.      POC Glucose Once [[id number]]  (Abnormal) Collected: 11/15/21 0615    Specimen: Blood Updated: 11/15/21 0635     Glucose 277 mg/dL      Comment: RN NotifiedOperator: [id number] DARIEN Cruz ID: [id number]       CBC & Differential [[id number]]  (Abnormal) Collected: 11/15/21 0533    Specimen: Blood Updated: 11/15/21 0609    Narrative:      The following orders were created for panel  order CBC & Differential.  Procedure                               Abnormality         Status                     ---------                               -----------         ------                     CBC Auto Differential[[id number]]        Abnormal            Final result                 Please view results for these tests on the individual orders.    CBC Auto Differential [[id number]]  (Abnormal) Collected: 11/15/21 0533    Specimen: Blood Updated: 11/15/21 0609     WBC 9.11 10*3/mm3      RBC 3.10 10*6/mm3      Hemoglobin 8.6 g/dL      Hematocrit 25.9 %      MCV 83.5 fL      MCH 27.7 pg      MCHC 33.2 g/dL      RDW 14.8 %      RDW-SD 44.6 fl      MPV 8.5 fL      Platelets 241 10*3/mm3      Neutrophil % 66.0 %      Lymphocyte % 20.2 %      Monocyte % 7.8 %      Eosinophil % 3.7 %      Basophil % 0.8 %      Immature Grans % 1.5 %      Neutrophils, Absolute 6.01 10*3/mm3      Lymphocytes, Absolute 1.84 10*3/mm3      Monocytes, Absolute 0.71 10*3/mm3      Eosinophils, Absolute 0.34 10*3/mm3      Basophils, Absolute 0.07 10*3/mm3      Immature Grans, Absolute 0.14 10*3/mm3      nRBC 0.0 /100 WBC     POC Glucose Once [[id number]]  (Abnormal) Collected: 11/14/21 1712    Specimen: Blood Updated: 11/14/21 1724     Glucose 135 mg/dL      Comment: RN NotifiedOperator: [id number] MARKS ALEXISMeter ID: [id number]       POC Glucose Once [[id number]]  (Abnormal) Collected: 11/13/21 0633    Specimen: Blood Updated: 11/14/21 1649     Glucose 387 mg/dL      Comment: RN NotifiedOperator: [id number] PATRICIA POLLOCKMeter ID: [id number]       POC Glucose Once [[id number]]  (Abnormal) Collected: 11/14/21 1142    Specimen: Blood Updated: 11/14/21 1217     Glucose 254 mg/dL      Comment: RN NotifiedOperator: [id number] DARIEN SHONTIKAMeter ID: [id number]       POC Glucose Once [[id number]]  (Abnormal) Collected: 11/14/21 0616    Specimen: Blood Updated: 11/14/21 1215     Glucose 151 mg/dL      Comment: RN NotifiedOperator:  [id number] TRACE Cruz ID: [id number]           Imaging Results (Last 72 Hours)     ** No results found for the last 72 hours. **             HOSPITAL COURSE:    The patient was admitted to the hospital for ACS rule out.    ACS rule out:  ECG showed no ST segment changes.  Troponins were negative.  Cardiology was consulted and the patient received a nuclear stress test that was consistent with an intermediate risk study.  The patient then received a left heart catheterization with stenting of the left distal main and ostial circumflex arteries and thrombectomy of the left main coronary artery. The chest pain subsequently resolved.  She was started on Ranexa 500 mg twice daily by cardiology and her dose of Imdur was adjusted down to 30 mg daily prior to discharge.  Her other medicines were continued.    Diabetes:  Dr. Elijah Stephenson of endocrinology was consulted due to the patient's persistent hyperglycemia.  He started her on an insulin drip and then switched her to subcutaneous insulin and performed a carb count.  It was thought that the patient's insulin requirements were consistent with her known weight and that her persistent hyperglycemia is secondary to her persistent bad habit of eating foods that are contrary to what should be a diabetic consistent carbohydrate diet.  Dr. Stephenson asked to see her outpatient on Monday, November 22 at 11:30 AM and this appointment was set up.  In the meantime, the patient was to be continued on 60 mg of long-acting Levemir per Dr. Stephenson.    End-stage renal disease:  The patient has end-stage renal disease and has been on hemodialysis.  She received hemodialysis several times during her hospital stay.  Per discharge, nephrology documented no concern with hyponatremia in the setting of modulation with hemodialysis.  They recommended keeping vitamin B12 and folate supplementation.   Her dose of lisinopril was decreased from 10 mg to 5 mg by nephrology during her  stay.  She will be following up outpatient with Dr. Lauren of nephrology.    Anemia:  The patient has a normocytic anemia consistent with her chronic disease state and end-stage renal function.  She did receive 2 units of blood transfusion after her catheterization, due to hematoma.  After this, her hemoglobin remained stable. The patient will now begin getting IV iron and Epogen with hemodialysis in the outpatient setting.         DISCHARGE CONDITION:   Stable     DISPOSITION:  [place] [facility] [place]    DISCHARGE MEDICATIONS     Discharge Medications      New Medications      Instructions Start Date   epoetin hubert-epbx 83529 UNIT/ML injection  Commonly known as: RETACRIT   10,000 Units, Subcutaneous, 3 Times Weekly   Start Date: November 17, 2021     fluticasone 50 MCG/ACT nasal spray  Commonly known as: FLONASE   2 sprays, Each Nare, Daily   Start Date: November 16, 2021     insulin detemir 100 UNIT/ML injection  Commonly known as: LEVEMIR   60 Units, Subcutaneous, Daily   Start Date: November 16, 2021     ranolazine 500 MG 12 hr tablet  Commonly known as: RANEXA   500 mg, Oral, Every 12 Hours Scheduled         Changes to Medications      Instructions Start Date   isosorbide mononitrate 30 MG 24 hr tablet  Commonly known as: IMDUR  What changed:   · medication strength  · how much to take   30 mg, Oral, Every Morning      lisinopril 5 MG tablet  Commonly known as: PRINIVIL,ZESTRIL  What changed:   · medication strength  · how much to take   5 mg, Oral, Daily   Start Date: November 16, 2021        Continue These Medications      Instructions Start Date   acetaminophen 650 MG 8 hr tablet  Commonly known as: Tylenol 8 Hour   650 mg, Oral, Every 8 Hours PRN      Adult Aspirin Regimen 81 MG EC tablet  Generic drug: aspirin   81 mg, Oral, Daily      albuterol sulfate  (90 Base) MCG/ACT inhaler  Commonly known as: PROVENTIL HFA;VENTOLIN HFA;PROAIR HFA   2 puffs, Inhalation, Every 4 Hours PRN      albuterol (2.5  MG/3ML) 0.083% nebulizer solution  Commonly known as: PROVENTIL   Inhale the contents of 1 vial by nebulization Every 4 (Four) Hours As Needed for Wheezing.      atorvastatin 20 MG tablet  Commonly known as: LIPITOR   TAKE ONE TABLET BY MOUTH EVERY NIGHT AT BEDTIME      bumetanide 2 MG tablet  Commonly known as: BUMEX   2 mg, Oral, 4 Times Weekly      cetirizine 10 MG tablet  Commonly known as: zyrTEC   10 mg, Oral, Daily      clopidogrel 75 MG tablet  Commonly known as: PLAVIX   75 mg, Oral, Daily      [employer] Blood Glucose Meter w/Device kit   1 each, Does not apply, 3 Times Daily      cyclobenzaprine 10 MG tablet  Commonly known as: FLEXERIL   10 mg, Oral, 2 Times Daily PRN      DULoxetine 20 MG capsule  Commonly known as: CYMBALTA   20 mg, Oral, Daily      Easy Touch Pen Needles 31G X 8 MM misc  Generic drug: Insulin Pen Needle   No dose, route, or frequency recorded.      NovoFine 30G X 8 MM misc  Generic drug: Insulin Pen Needle   As directed 4 times daily      Insulin Pen Needle 31G X 8 MM misc   Use to inject insulin 4 (Four) Times a Day as directed.      ferrous sulfate 325 (65 FE) MG tablet  Commonly known as: FeroSul   325 mg, Oral, Daily With Breakfast      FreeStyle Jade 2 Ola device   1 each, Does not apply, Continuous      FreeStyle Jade 2 Sensor misc   1 each, Does not apply, Every 14 Days      gabapentin 400 MG capsule  Commonly known as: NEURONTIN   400 mg, Oral, 3 Times Daily      glucose blood test strip   Use as instructed      insulin aspart 100 UNIT/ML solution pen-injector sc pen  Commonly known as: novoLOG FLEXPEN   30 Units, Subcutaneous, 3 Times Daily With Meals      ipratropium-albuterol 0.5-2.5 mg/3 ml nebulizer  Commonly known as: DUO-NEB   3 mL, Nebulization, 4 Times Daily - RT      levothyroxine 25 MCG tablet  Commonly known as: SYNTHROID, LEVOTHROID   25 mcg, Oral, Daily      lidocaine 5 %  Commonly known as: LIDODERM   APPLY TO THE AFFECTED AREA(S) TOPICALLY TWO TIMES A DAY. REMOVE  NIGHTLY      metoprolol tartrate 50 MG tablet  Commonly known as: LOPRESSOR   TAKE ONE TABLET BY MOUTH TWO TIMES A DAY. HOLD IF PULSE IS LESS THAN 60      montelukast 10 MG tablet  Commonly known as: SINGULAIR   10 mg, Oral, Nightly      nitroglycerin 0.4 MG SL tablet  Commonly known as: NITROSTAT   0.4 mg, Sublingual, Every 5 Minutes PRN      nystatin 993727 UNIT/GM powder  Commonly known as: MYCOSTATIN   Topical, 3 Times Daily      ondansetron ODT 4 MG disintegrating tablet  Commonly known as: Zofran ODT   4 mg, Translingual, Every 8 Hours PRN      oxybutynin XL 5 MG 24 hr tablet  Commonly known as: DITROPAN-XL   5 mg, Oral, Daily      pantoprazole 40 MG EC tablet  Commonly known as: PROTONIX   40 mg, Oral, Nightly      promethazine 25 MG tablet  Commonly known as: PHENERGAN   25 mg, Oral, Every 6 Hours PRN      sennosides-docusate 8.6-50 MG per tablet  Commonly known as: PERICOLACE   2 tablets, Oral, 2 Times Daily         Stop These Medications    sodium bicarbonate 650 MG tablet     Tresiba FlexTouch 100 UNIT/ML solution pen-injector injection  Generic drug: insulin degludec     Trulicity 0.75 MG/0.5ML solution pen-injector  Generic drug: Dulaglutide            INSTRUCTIONS:  Activity:   Activity Instructions     Activity as Tolerated          Diet:   Diet Instructions     Diet: Consistent Carbohydrate      Discharge Diet: Consistent Carbohydrate          FOLLOW UP:   Additional Instructions for the Follow-ups that You Need to Schedule     Ambulatory Referral to Home Health   As directed      Face to Face Visit Date: 11/15/2021    Follow-up provider for Plan of Care?: I treated the patient in an acute care facility and will not continue treatment after discharge.    Follow-up provider: MAITE QUINN [[id number]]    Reason/Clinical Findings: Weight, deconditioning    Describe mobility limitations that make leaving home difficult: Weight, deconditioning    Nursing/Therapeutic Services Requested: Physical Therapy  Occupational Therapy    PT orders: Other (add comment) (CHF Managment and Medication Education)    Frequency: 1 Week 1         Ambulatory Referral to Home Health   As directed      Face to Face Visit Date: 11/15/2021    Follow-up provider for Plan of Care?: I treated the patient in an acute care facility and will not continue treatment after discharge.    Follow-up provider: RIANNA QUINN [[id number]]    Reason/Clinical Findings: Strengthening/Weakness/ADL difficulty    Describe mobility limitations that make leaving home difficult: patient needs attention to her mobility and has been improving with PT/OT    Nursing/Therapeutic Services Requested: Physical Therapy Occupational Therapy    Occupational orders: Activities of daily living Strengthening    Frequency: 1 Week 1         Discharge Follow-up with PCP   As directed       Currently Documented PCP:    Rianna Quinn MD    PCP Phone Number:    [phone]     Follow Up Details: Within 1 week for post hospital visit         Discharge Follow-up with Specified Provider: Dr. Elijah Downs Endocrinology - Dr. Nava requested appointment on November Monday 22 11:30 am   As directed      To: Dr. Elijah Downs Endocrinology - Dr. Nava requested appointment on November Monday 22 11:30 am    Follow Up Details: Dr. Nava requested appointment on November Monday 22 11:30 am         Discharge Follow-up with Specified Provider: Dr. Lauren of Nephrology; 2 Weeks   As directed      To: Dr. Lauren of Nephrology    Follow Up: 2 Weeks    Follow Up Details: As previously scheduled for dialysis appointments         Discharge Follow-up with Specified Provider: Dr. Bautista of Cardiology; 1 Month   As directed      To: Dr. Bautista of Cardiology    Follow Up: 1 Month    Follow Up Details: Dr. Bautista of Cardiology For Hospitalization for stent placement            Contact information for follow-up providers     Margarito Davis MD. Schedule an appointment as soon as  possible for a visit in 1 month(s).    Specialty: Cardiology  Why: 1 month hospital follow up, stent.   Contact information:  [facility]   [address]  [facility] [id number] [phone]             Rianna Macias MD Follow up.    Specialty: Family Medicine  Why: Within 1 week for post hospital visit  Contact information:  [address] [place] [id number] [phone]                   Contact information for after-discharge care     Home Medical Care     [facility] .    Service: Home Health Services  Contact information:  [address] [place] [address] [phone]                             PENDING TEST RESULTS AT DISCHARGE  [unfilled]    Time: 35 minutes was spent in discharge planning, medication reconciliation and coordination of care for this patient.    Alex Wells MD is the attending at time of discharge, He is aware of the patient's status and agrees with the above discharge summary.      This document has been electronically signed by Alvarado Mauricio MD on November 15, 2021 11:56 CST    Part of this note may be an electronic transcription or translation of spoken language to printed text using the Dragon Dictation System              Electronically signed by Alvarado Mauricio MD at 11/15/21 2867

## 2021-11-17 ENCOUNTER — TELEPHONE (OUTPATIENT)
Dept: FAMILY MEDICINE CLINIC | Facility: CLINIC | Age: 61
End: 2021-11-17

## 2021-11-17 ENCOUNTER — OFFICE VISIT (OUTPATIENT)
Dept: FAMILY MEDICINE CLINIC | Facility: CLINIC | Age: 61
End: 2021-11-17

## 2021-11-17 ENCOUNTER — TRANSITIONAL CARE MANAGEMENT TELEPHONE ENCOUNTER (OUTPATIENT)
Dept: CALL CENTER | Facility: HOSPITAL | Age: 61
End: 2021-11-17

## 2021-11-17 VITALS
TEMPERATURE: 97.1 F | HEIGHT: 62 IN | DIASTOLIC BLOOD PRESSURE: 82 MMHG | WEIGHT: 286.3 LBS | HEART RATE: 68 BPM | BODY MASS INDEX: 52.68 KG/M2 | OXYGEN SATURATION: 98 % | SYSTOLIC BLOOD PRESSURE: 120 MMHG

## 2021-11-17 DIAGNOSIS — R07.9 CHEST PAIN, UNSPECIFIED TYPE: Primary | ICD-10-CM

## 2021-11-17 DIAGNOSIS — D50.8 OTHER IRON DEFICIENCY ANEMIA: ICD-10-CM

## 2021-11-17 DIAGNOSIS — Z12.31 VISIT FOR SCREENING MAMMOGRAM: ICD-10-CM

## 2021-11-17 PROCEDURE — 99213 OFFICE O/P EST LOW 20 MIN: CPT | Performed by: STUDENT IN AN ORGANIZED HEALTH CARE EDUCATION/TRAINING PROGRAM

## 2021-11-17 RX ORDER — GABAPENTIN 800 MG/1
TABLET ORAL
COMMUNITY
Start: 2021-11-01 | End: 2021-12-01 | Stop reason: SDUPTHER

## 2021-11-17 NOTE — TELEPHONE ENCOUNTER
PATIENT CALLED ADVISING SHE WAS SEEN TODAY BY DR CABRALES.  SHE NEEDS TO KNOW IF HE WILL PRESCRIBE HER PAIN MEDS FOR HER DUE TO HER HAVING DIALYSIS.  SHE ADVISED AFTER HER DIALYSIS SHE HURTS FROM HEAD TO TOE.    PLEASE CALL     [phone]

## 2021-11-17 NOTE — PROGRESS NOTES
Family Medicine Residency  Alvarado Mauricio MD    Subjective:     Yamileth Slater is a 61 y.o. female with a concurrent medical history of noncompliance, CAD, chronic congestive heart failure, dyslipidemia, essential hypertension, venous insufficiency, physical deconditioning, diabetes mellitus, hypothyroidism, morbid obesity type 2 diabetes mellitus, CKD stage V on dialysis and a past medical history of CABG x3 and NSTEMI who presents for hospital follow-up visit.    The patient was recently admitted for chest pain.  Stress test led her to receive a left heart catheterization in which 2 stents were placed.  Initially, the patient said she had not had any chest pain since her discharge from the hospital.  During our encounter, she said she temporarily experienced chest pain.  An ECG was obtained that demonstrated sinus rhythm and signs of old infarct consistent with her history.  This pain quickly resolved and she was no longer having chest pain for the duration of the encounter.    The following portions of the patient's history were reviewed and updated as appropriate: allergies, current medications, past family history, past medical history, past social history, past surgical history and problem list.    Past Medical Hx:  Past Medical History:   Diagnosis Date   • Acute blood loss anemia 4/16/2017    Likely due to gastric oozing at this time. - Dr. Duarte (GI) was consulted and has now signed off, will follow up outpatient - pill colonoscopy showed AVMs - continue to monitor   • Anxiety    • Carotid artery stenosis    • Chronic obstructive lung disease (HCC)    • CKD (chronic kidney disease) stage 4, GFR 15-29 ml/min ([facility])    • Colonic polyp    • Coronary arteriosclerosis    • Diabetes mellitus (HCC)    • Diabetic neuropathy (HCC)    • Ear pain, right 10/18/2021    - canal trauma due to patient scratching and DMT2 - added cortisporin ear drops   • Elevated troponin 10/12/2021    -most likely from CKD  -Trending down -Neg chest pain   • GERD (gastroesophageal reflux disease)    • GI bleed 5/13/2021    - GI will follow up outpatient - Protonix 40mg daily - Avoid medical DVT prophy and use mechanical at this time instead. - Continue to monitor - pill colonoscopy results showed AVMs   • History of transfusion    • Hypercholesterolemia    • Hypertension    • Hypomagnesemia 6/27/2021    Monitor and replace   • Morbid obesity (HCC)    • Nephrolithiasis    • Peripheral vascular disease (HCC)    • Sleep apnea    • Substance abuse (HCC)    • Vitamin D deficiency        Past Surgical Hx:  Past Surgical History:   Procedure Laterality Date   • CARDIAC CATHETERIZATION N/A 7/14/2020   • CARDIAC CATHETERIZATION N/A 4/23/2021    Procedure: Left Heart Cath;  Surgeon: Melba Romo MD;  Location: [facility] CATH INVASIVE LOCATION;  Service: Cardiology;  Laterality: N/A;   • CARDIAC CATHETERIZATION N/A 4/30/2021    Procedure: Percutaneous Coronary Intervention;  Surgeon: Russell Voss MD;  Location: [facility] CATH INVASIVE LOCATION;  Service: Cardiovascular;  Laterality: N/A;   • CARDIAC CATHETERIZATION N/A 4/30/2021    Procedure: Stent NIKKI coronary;  Surgeon: Russell Voss MD;  Location: [facility] CATH INVASIVE LOCATION;  Service: Cardiovascular;  Laterality: N/A;   • CARDIAC CATHETERIZATION Left 11/13/2021    Procedure: Left Heart Cath;  Surgeon: Niall Rios MD;  Location: [facility] CATH INVASIVE LOCATION;  Service: Cardiology;  Laterality: Left;   • CAROTID STENT Left    • COLONOSCOPY     • COLONOSCOPY N/A 5/14/2021    Procedure: COLONOSCOPY;  Surgeon: Mingo Duarte MD;  Location: [facility] ENDOSCOPY;  Service: Gastroenterology;  Laterality: N/A;   • CORONARY ARTERY BYPASS GRAFT N/A 2013    CABG X 3   • CYSTOSCOPY BLADDER STONE LITHOTRIPSY Bilateral    • ENDOSCOPY N/A 4/12/2021    Procedure: ESOPHAGOGASTRODUODENOSCOPY;  Surgeon: Mingo Duarte MD;  Location: [facility] ENDOSCOPY;  Service: Gastroenterology;   Laterality: N/A;   • ENDOSCOPY N/A 5/14/2021    Procedure: ESOPHAGOGASTRODUODENOSCOPY;  Surgeon: Mingo Duarte MD;  Location: [facility] ENDOSCOPY;  Service: Gastroenterology;  Laterality: N/A;   • INTERVENTIONAL RADIOLOGY PROCEDURE N/A 10/21/2021    Procedure: tunneled central venous catheter placement;  Surgeon: Donnie Robles MD;  Location: [facility] ANGIO INVASIVE LOCATION;  Service: Interventional Radiology;  Laterality: N/A;       Current Meds:    Current Outpatient Medications:   •  acetaminophen (Tylenol 8 Hour) 650 MG 8 hr tablet, Take 1 tablet by mouth Every 8 (Eight) Hours As Needed for Mild Pain ., Disp: 90 tablet, Rfl: 0  •  albuterol (PROVENTIL) (2.5 MG/3ML) 0.083% nebulizer solution, Inhale the contents of 1 vial by nebulization Every 4 (Four) Hours As Needed for Wheezing., Disp: 75 mL, Rfl: 3  •  albuterol sulfate  (90 Base) MCG/ACT inhaler, Inhale 2 puffs Every 4 (Four) Hours As Needed for Wheezing., Disp: 18 g, Rfl: 1  •  aspirin (aspirin) 81 MG EC tablet, Take 1 tablet by mouth Daily., Disp: 60 tablet, Rfl: 5  •  atorvastatin (LIPITOR) 20 MG tablet, TAKE ONE TABLET BY MOUTH EVERY NIGHT AT BEDTIME, Disp: 30 tablet, Rfl: 1  •  Blood Glucose Monitoring Suppl ([employer] Blood Glucose Meter) w/Device kit, 1 each 3 (Three) Times a Day., Disp: 1 kit, Rfl: 3  •  bumetanide (BUMEX) 2 MG tablet, Take 1 tablet by mouth 4 (Four) Times a Week., Disp: 30 tablet, Rfl: 0  •  cetirizine (zyrTEC) 10 MG tablet, Take 1 tablet by mouth Daily., Disp: 30 tablet, Rfl: 3  •  clopidogrel (PLAVIX) 75 MG tablet, Take 1 tablet by mouth Daily., Disp: 30 tablet, Rfl: 5  •  Continuous Blood Gluc  (FreeStyle Jade 2 Cotter) device, 1 each Continuous., Disp: 1 each, Rfl: 0  •  Continuous Blood Gluc Sensor (FreeStyle Jade 2 Sensor) misc, 1 each Every 14 (Fourteen) Days., Disp: 2 each, Rfl: 11  •  cyclobenzaprine (FLEXERIL) 10 MG tablet, Take 1 tablet by mouth 2 (Two) Times a Day As Needed for Muscle Spasms., Disp:  60 tablet, Rfl: 5  •  DULoxetine (CYMBALTA) 20 MG capsule, TAKE 1 CAPSULE BY MOUTH DAILY., Disp: 30 capsule, Rfl: 2  •  EASY TOUCH PEN NEEDLES 31G X 8 MM misc, , Disp: , Rfl:   •  epoetin hubert-epbx (RETACRIT) 31880 UNIT/ML injection, Inject 1 mL under the skin into the appropriate area as directed 3 (Three) Times a Week. Indications: ESRD on Dialysis, Disp: 6.6 mL, Rfl: 0  •  ferrous sulfate (FeroSul) 325 (65 FE) MG tablet, Take 1 tablet by mouth Daily With Breakfast., Disp: 30 tablet, Rfl: 3  •  fluticasone (FLONASE) 50 MCG/ACT nasal spray, 2 sprays by Each Nare route Daily., Disp: 9.9 mL, Rfl: 0  •  gabapentin (NEURONTIN) 400 MG capsule, Take 1 capsule by mouth 3 (Three) Times a Day., Disp: 90 capsule, Rfl: 0  •  glucose blood test strip, Use as instructed, Disp: 100 each, Rfl: 12  •  insulin aspart (novoLOG FLEXPEN) 100 UNIT/ML solution pen-injector sc pen, Inject 30 Units under the skin into the appropriate area as directed 3 (Three) Times a Day With Meals., Disp: 30 mL, Rfl: 12  •  insulin detemir (LEVEMIR) 100 UNIT/ML injection, Inject 60 Units under the skin into the appropriate area as directed Daily., Disp: 2 mL, Rfl: 12  •  Insulin Pen Needle (NovoFine) 30G X 8 MM misc, As directed 4 times daily, Disp: 100 each, Rfl: 11  •  Insulin Pen Needle 31G X 8 MM misc, Use to inject insulin 4 (Four) Times a Day as directed., Disp: 120 each, Rfl: 12  •  ipratropium-albuterol (DUO-NEB) 0.5-2.5 mg/3 ml nebulizer, Take 3 mL by nebulization 4 (Four) Times a Day., Disp: , Rfl:   •  isosorbide mononitrate (IMDUR) 30 MG 24 hr tablet, Take 1 tablet by mouth Every Morning., Disp: 30 tablet, Rfl: 0  •  levothyroxine (SYNTHROID, LEVOTHROID) 25 MCG tablet, Take 25 mcg by mouth Daily., Disp: , Rfl:   •  lidocaine (LIDODERM) 5 %, APPLY TO THE AFFECTED AREA(S) TOPICALLY TWO TIMES A DAY. REMOVE NIGHTLY, Disp: 30 patch, Rfl: 1  •  lisinopril (PRINIVIL,ZESTRIL) 5 MG tablet, Take 1 tablet by mouth Daily., Disp: 30 tablet, Rfl: 0  •   metoprolol tartrate (LOPRESSOR) 50 MG tablet, TAKE ONE TABLET BY MOUTH TWO TIMES A DAY. HOLD IF PULSE IS LESS THAN 60, Disp: 60 tablet, Rfl: 1  •  montelukast (SINGULAIR) 10 MG tablet, Take 1 tablet by mouth Every Night., Disp: 30 tablet, Rfl: 5  •  nitroglycerin (NITROSTAT) 0.4 MG SL tablet, Place 0.4 mg under the tongue Every 5 (Five) Minutes As Needed for Chest Pain (x 3 doses)., Disp: , Rfl:   •  nystatin (MYCOSTATIN) 356014 UNIT/GM powder, Apply  topically to the appropriate area as directed 3 (Three) Times a Day., Disp: 15 g, Rfl: 1  •  ondansetron ODT (Zofran ODT) 4 MG disintegrating tablet, Place 1 tablet on the tongue Every 8 (Eight) Hours As Needed for Nausea or Vomiting., Disp: 30 tablet, Rfl: 0  •  oxybutynin XL (DITROPAN-XL) 5 MG 24 hr tablet, Take 1 tablet by mouth Daily., Disp: 90 tablet, Rfl: 1  •  pantoprazole (PROTONIX) 40 MG EC tablet, Take 1 tablet by mouth Every Night., Disp: 30 tablet, Rfl: 5  •  promethazine (PHENERGAN) 25 MG tablet, Take 25 mg by mouth Every 6 (Six) Hours As Needed., Disp: , Rfl:   •  ranolazine (RANEXA) 500 MG 12 hr tablet, Take 1 tablet by mouth Every 12 (Twelve) Hours., Disp: 30 tablet, Rfl: 0  •  sennosides-docusate (PERICOLACE) 8.6-50 MG per tablet, Take 2 tablets by mouth 2 (Two) Times a Day., Disp: 60 tablet, Rfl: 2  •  gabapentin (NEURONTIN) 800 MG tablet, , Disp: , Rfl:     Allergies:  Allergies   Allergen Reactions   • Adhesive Tape Hives   • Other      Bandaids, MRSA, SURECLOSE       Family Hx:  Family History   Problem Relation Age of Onset   • Heart disease Mother    • Heart disease Father    • Heart disease Sister    • Heart disease Brother         Social History:  Social History     Socioeconomic History   • Marital status:      Spouse name: wesley dumont   Tobacco Use   • Smoking status: Former Smoker     Packs/day: 0.25     Years: 46.00     Pack years: 11.50     Types: Cigarettes   • Smokeless tobacco: Never Used   • Tobacco comment: only smoking 5 a  "day - quit april 23 2021   Substance and Sexual Activity   • Alcohol use: No   • Drug use: Not Currently     Types: LSD, Marijuana, Methamphetamines   • Sexual activity: Not Currently       Review of Systems  Review of Systems   Constitutional: Positive for fatigue. Negative for fever.   Respiratory: Negative for shortness of breath and wheezing.    Cardiovascular: Negative for chest pain, palpitations and leg swelling.   Gastrointestinal: Negative for abdominal pain, nausea and vomiting.   Neurological: Positive for weakness. Negative for light-headedness.   Psychiatric/Behavioral: Positive for sleep disturbance.       Objective:     /82   Pulse 68   Temp 97.1 °F (36.2 °C)   Ht 157.5 cm (62\")   Wt 130 kg (286 lb 4.8 oz)   LMP  (LMP Unknown)   SpO2 98% Comment: oxygen level 3  BMI 52.36 kg/m²   Physical Exam  Constitutional:       Appearance: She is obese.      Comments: On wheelchair. On oxygen.    HENT:      Head: Normocephalic.   Cardiovascular:      Rate and Rhythm: Normal rate and regular rhythm.   Pulmonary:      Effort: Pulmonary effort is normal. No respiratory distress.      Breath sounds: Wheezing present.   Musculoskeletal:      Cervical back: Normal range of motion.      Right lower leg: No edema.      Left lower leg: No edema.   Skin:     Coloration: Skin is not jaundiced.   Neurological:      Motor: No weakness.          Assessment/Plan:     Diagnoses and all orders for this visit:    1. Chest pain, unspecified type (Primary)    Yamileth Slater is a 61 y.o. female with a concurrent medical history of noncompliance, CAD, chronic congestive heart failure, dyslipidemia, essential hypertension, venous insufficiency, physical deconditioning, diabetes mellitus, hypothyroidism, morbid obesity type 2 diabetes mellitus, CKD stage V on dialysis and a past medical history of CABG x3 and NSTEMI who presents for hospital follow-up visit.  Also been started on Ranexa during her " hospitalization.    The patient was recently admitted for chest pain.  Stress test led her to receive a left heart catheterization in which 2 stents were placed.  Initially, the patient said she had not had any chest pain since her discharge from the hospital.  During our encounter, she said she temporarily experienced chest pain.  An ECG was obtained that demonstrated sinus rhythm and signs of old infarct consistent with her history.  I reviewed this with my attending and will place it to be scanned into the chart.  This pain quickly resolved and she was no longer having chest pain for the duration of the encounter.    Patient encouraged to be compliant with her diabetes diet and to follow-up outpatient with endocrinology and cardiology as previously scheduled.      -     ECG 12 Lead    2.  Breast lump  The patient noted on our way out of the room that she had noticed a lump on her breast and had not had a mammogram recently.  There was not time as we were on the way out of the room to do a breast exam.  This could be done in the future by her PCP, but in the meantime I will put in for an order for her to receive a mammogram.    3.  Patient needs follow-up with PCP for wellness exam  This is a very complicated patient.  Has an extensive medicine list and problem list that is complicated by noncompliance.  Her problem list needs to be updated.  She also has several care gaps that need to be addressed.  I will schedule her for an annual wellness visit with her PCP specifically, to give her PCP time to get to know her and go through all of these conditions and address them.        · Rx changes: None  · Patient Education: Counseled to go to the emergency room if she developed unrelenting chest pain  · Compliance at present is estimated to be poor.      Follow-up:     Return in about 1 month (around 12/17/2021) for Annual WITH PCP ONLY.    Preventative:  Health Maintenance   Topic Date Due   • ANNUAL WELLNESS VISIT   Never done   • DIABETIC EYE EXAM  10/09/2019   • DIABETIC FOOT EXAM  12/26/2019   • PAP SMEAR  01/04/2020   • ZOSTER VACCINE (1 of 2) 11/17/2021 (Originally 3/10/2009)   • COVID-19 Vaccine (1) 11/19/2021 (Originally 3/10/1972)   • Pneumococcal Vaccine 0-64 (3 of 4 - PPSV23) 11/17/2022 (Originally 2/4/2020)   • MAMMOGRAM  01/10/2022   • HEMOGLOBIN A1C  04/26/2022   • LIPID PANEL  06/02/2022   • URINE MICROALBUMIN  10/19/2022   • TDAP/TD VACCINES (3 - Td or Tdap) 11/10/2024   • COLORECTAL CANCER SCREENING  05/14/2026   • HEPATITIS C SCREENING  Completed   • INFLUENZA VACCINE  Completed   • Hepatitis B  Aged Out       Alcohol use:  reports no history of alcohol use.  Nicotine status  reports that she has quit smoking. Her smoking use included cigarettes. She has a 11.50 pack-year smoking history. She has never used smokeless tobacco.    Goals     • Fasting Blood Glucose       Barriers: compliance with diet      • Quit smoking / using tobacco           RISK SCORE: 5      This document has been electronically signed by Alvarado Mauricio MD on November 17, 2021 14:36 CST

## 2021-11-19 ENCOUNTER — TELEPHONE (OUTPATIENT)
Dept: FAMILY MEDICINE CLINIC | Facility: CLINIC | Age: 61
End: 2021-11-19

## 2021-11-19 NOTE — TELEPHONE ENCOUNTER
attempted to call pt in reference to referral appt date & time. got recording voice mail not set up yet

## 2021-11-22 ENCOUNTER — HOME CARE VISIT (OUTPATIENT)
Dept: HOME HEALTH SERVICES | Facility: CLINIC | Age: 61
End: 2021-11-22

## 2021-11-22 VITALS
DIASTOLIC BLOOD PRESSURE: 72 MMHG | HEART RATE: 74 BPM | OXYGEN SATURATION: 91 % | TEMPERATURE: 97.1 F | RESPIRATION RATE: 18 BRPM | SYSTOLIC BLOOD PRESSURE: 125 MMHG

## 2021-11-22 VITALS
DIASTOLIC BLOOD PRESSURE: 70 MMHG | TEMPERATURE: 97.9 F | SYSTOLIC BLOOD PRESSURE: 122 MMHG | OXYGEN SATURATION: 94 % | HEART RATE: 62 BPM | RESPIRATION RATE: 18 BRPM

## 2021-11-22 PROCEDURE — G0151 HHCP-SERV OF PT,EA 15 MIN: HCPCS

## 2021-11-22 PROCEDURE — G0152 HHCP-SERV OF OT,EA 15 MIN: HCPCS

## 2021-11-22 RX ORDER — SENNOSIDES 8.6 MG
650 CAPSULE ORAL EVERY 8 HOURS PRN
Qty: 90 TABLET | Refills: 0 | Status: SHIPPED | OUTPATIENT
Start: 2021-11-22 | End: 2022-02-01 | Stop reason: SDUPTHER

## 2021-11-22 NOTE — HOME HEALTH
DIALYSIS CURRENTLY TUES, THURSDAY, SATURDAY HOWEVER NEXT WEEK STARTS NEW DIALYSIS SCHEDULE (WEEK OF 11/29/21) BEGINS MON, WED, FRIDAY    REASON FOR REFERRAL:  62 y/o female hospitalized a [facility] 10/11/21-10/28/21 for GI bleed and s/p transfusion.  She transferred to [facility] 10/28/21-11/8/21 for rehab services then transferred back to [facility] 11/8/21-11/15/21 with complaints of chest pain and was treated for penumonia, acute on chronic CHF, and s/p cardiac stenting x2.  Patient also is dependent on dialysis Tues, Thurs, Saturdays.      PMH:  CAD, DM, CKD, CHF, COPD, HTN, HLD, GERD, PVD, anemia, chronic respiratory failure-3L per NC, anxiety, neuropathy, substance abuse, carotid stenting, CABG x3.    PLOF:  PRN assistance for ADLs from spouse, independent with functional mobility without AD inside apartment, and SB(A) for mobility using rollator walker outside of home.    HOME ENVIRONMENT: Patient lives in one level apartment with spouse with no steps to enter.    PRECAUTIONS: HIGH FALL RISK    MD APPTS:  12/14/21 Dr Davis, 11/30/21 Dr Lauren    DME: Oxygen equipment, rollator walker, shower chair in tub/shower combo with curtain, hand held shower, handicapped height toilet, grab bar in shower, trilogy    AROM B UES: WFL    STRENGTH B UES: 4-/5 grossly throughout.    HAND DOMINANCE: R hand dominant, B  strengths: 4-/5.  Patient with complaints of decreased sensation B Hands at B fingers.  Patient with decreased fine motor coordination NENA.    REHAB POTENTIAL:  Good for Goals    WISH TO ADDRESS BATH/DRESSING WITH OT:  NO    PATIENT'S GOAL: GET STRONGER

## 2021-11-22 NOTE — TELEPHONE ENCOUNTER
Spoke with patient regarding her having pain after her dialysis session which are 3 times a week. Patient stated that she has been given morphine, dilaudid, and norco before for pain at the hospital setting. I explained to her that the hospital is a controlled enviroment, and that I will be sending tylenol for her pain. Patient agreed. All questions answered during the call.

## 2021-11-23 ENCOUNTER — READMISSION MANAGEMENT (OUTPATIENT)
Dept: CALL CENTER | Facility: HOSPITAL | Age: 61
End: 2021-11-23

## 2021-11-23 NOTE — HOME HEALTH
PATIENT WAS HOSPITALIZED FOR 3 WEEKS IN OCTOBER SECONDARY TO A GI BLEED. PATIENT HAS HAD AN INCREASE IN WEAKNESS AND HAS REECNTLY STARTED DIALYSIS AND WAS SENT TO A SNF FOR REHAB PRIOR TO DISCHARGE HOME FROM HOSPITAL. WHILE AT THE SNF PATIENT DEVELOPED CHEST PAIN AND WAS SENT DIRECTLY TO THE ER, SHE WAS FOUND TO HAVE 2 BLOCKAGES AND SHE NEEDED 2 STENTS PLACED WHILE HOSPITALIZED. PATIENT STATES SHE DEVELOPED HYPOTHERMIA WHILE HOSPITALIZED AFTER HER HEART CATH BUT SHE HAS STARTED FEELING BETTER. PATIENT WAS DISCHARGED HOME 11/15/2021 BUT WAS UNABLE TO BE ADMITTED LAST WEEK SECONDARY TO PATIENT REQUEST. PATIENT HAS HAD A HARD TIME WITH WEAKNESS AND FEELING BAD SINCE MULTIPLE HOSPITAL STAYS AND DIALYSIS.    PMHX: DM, CAD, CHF, CKD WITH DIALYSIS, HTN,       PATIENT GOAL: GET HERSELF BETTER    PRIOR LEVEL OF FUNCTION: PATIENT WAS LIVING N WITH HER SPOUSE. SHE WAS ABLE TO DO MOST THINGS INDEPENDENTLY BUT SHE DID HAVE SOME ASSITANCE FROM HER SPOUSE WHEN NEEDED     MEDICAL NECESSITY:  PATIENT WAS RECENTLY HOSPITALIZED 2 TIMES AND THEN SPENT TIME IN REHAB DUE TO CAD, CKD, CHF, COPD. PATIENT HAS RECENTLY STARTED DIALYSIS AND HAS HAD STENTS PLACED IN THE LAST HOSPITALIZATIONS. PATIENT CONTINUES TO HAVE WEAKNESS IN LE, DECREASED BALANCE, DECREASED GAIT, DIFFICULTY WITH TRANSFERS ALL WARRANTING SKILLED PT SERVICES. SHE HAS DECREASED UE STRENGTH DECREASED ADL, LIMITED STANDING TOLERANCE WARRANTING SKILLED OT SERVICES. PATIENT NEEDS MEDICATION MANAGEMENT, CP ASSESSMENTS AND HAS DIFFICULTY WITH NEW TRANSITION TO DIALYSIS REQUIRING SKILLED NURSING SERVICES

## 2021-11-23 NOTE — PROGRESS NOTES
Yamileth Slater 11/17/21  Subjective:     Yamileth Slater is a 61 y.o. female who presents for   Chief Complaint   Patient presents with   • Hospital Follow Up Visit       61-year-old female with history of coronary artery disease recently admitted with chest pain with subsequent work-up showing coronary artery disease requiring PTCA with stent placement and transfusion 2 units packed cells here today for post hospital follow-up patient also has end-stage renal disease and has undergone dialysis 3 times per week.  She continues to be fatigued and complains of increased somnolence and is generally debilitated.  Please see Dr. Mauricio's note for more detailed information regarding today's encounter including an episode of chest pain that she had during the visit which resolved her EKG did not show any acute changes.  Please also see Dr. Mauricio's note for more detailed information regarding physical exam findings and plan of care.        Past Medical Hx:  Past Medical History:   Diagnosis Date   • Acute blood loss anemia 4/16/2017    Likely due to gastric oozing at this time. - Dr. Duarte (GI) was consulted and has now signed off, will follow up outpatient - pill colonoscopy showed AVMs - continue to monitor   • Anxiety    • Carotid artery stenosis    • Chronic obstructive lung disease (HCC)    • CKD (chronic kidney disease) stage 4, GFR 15-29 ml/min (HCC)    • Colonic polyp    • Coronary arteriosclerosis    • Diabetes mellitus (HCC)    • Diabetic neuropathy (HCC)    • Ear pain, right 10/18/2021    - canal trauma due to patient scratching and DMT2 - added cortisporin ear drops   • Elevated troponin 10/12/2021    -most likely from CKD -Trending down -Neg chest pain   • GERD (gastroesophageal reflux disease)    • GI bleed 5/13/2021    - GI will follow up outpatient - Protonix 40mg daily - Avoid medical DVT prophy and use mechanical at this time instead. - Continue to monitor - pill colonoscopy results showed  AVMs   • History of transfusion    • Hypercholesterolemia    • Hypertension    • Hypomagnesemia 6/27/2021    Monitor and replace   • Morbid obesity (HCC)    • Nephrolithiasis    • Peripheral vascular disease (HCC)    • Sleep apnea    • Substance abuse (HCC)    • Vitamin D deficiency        Past Surgical Hx:  Past Surgical History:   Procedure Laterality Date   • CARDIAC CATHETERIZATION N/A 7/14/2020   • CARDIAC CATHETERIZATION N/A 4/23/2021    Procedure: Left Heart Cath;  Surgeon: Melba Romo MD;  Location: [facility] CATH INVASIVE LOCATION;  Service: Cardiology;  Laterality: N/A;   • CARDIAC CATHETERIZATION N/A 4/30/2021    Procedure: Percutaneous Coronary Intervention;  Surgeon: Russell Voss MD;  [facility];  Service: Cardiovascular;  Laterality: N/A;   • CARDIAC CATHETERIZATION N/A 4/30/2021    Procedure: Stent NIKKI coronary;  Surgeon: Russell Voss MD;  Location: [facility] CATH INVASIVE LOCATION;  Service: Cardiovascular;  Laterality: N/A;   • CARDIAC CATHETERIZATION Left 11/13/2021    Procedure: Left Heart Cath;  Surgeon: Niall Rios MD;  Location: [facility] CATH INVASIVE LOCATION;  Service: Cardiology;  Laterality: Left;   • CAROTID STENT Left    • COLONOSCOPY     • COLONOSCOPY N/A 5/14/2021    Procedure: COLONOSCOPY;  Surgeon: Mingo Duarte MD;  Location: [facility] ENDOSCOPY;  Service: Gastroenterology;  Laterality: N/A;   • CORONARY ARTERY BYPASS GRAFT N/A 2013    CABG X 3   • CYSTOSCOPY BLADDER STONE LITHOTRIPSY Bilateral    • ENDOSCOPY N/A 4/12/2021    Procedure: ESOPHAGOGASTRODUODENOSCOPY;  Surgeon: Mingo Duarte MD;  Location: [facility] ENDOSCOPY;  Service: Gastroenterology;  Laterality: N/A;   • ENDOSCOPY N/A 5/14/2021    Procedure: ESOPHAGOGASTRODUODENOSCOPY;  Surgeon: Mingo Duarte MD;  Location: [facility] ENDOSCOPY;  Service: Gastroenterology;  Laterality: N/A;   • INTERVENTIONAL RADIOLOGY PROCEDURE N/A 10/21/2021    Procedure: tunneled central  venous catheter placement;  Surgeon: Donnie Robles MD;  Location: [facility] ANGIO INVASIVE LOCATION;  Service: Interventional Radiology;  Laterality: N/A;       Health Maintenance:  Health Maintenance   Topic Date Due   • COVID-19 Vaccine (1) Never done   • ZOSTER VACCINE (1 of 2) Never done   • ANNUAL WELLNESS VISIT  Never done   • DIABETIC EYE EXAM  10/09/2019   • DIABETIC FOOT EXAM  12/26/2019   • PAP SMEAR  01/04/2020   • Pneumococcal Vaccine 0-64 (3 of 4 - PPSV23) 11/17/2022 (Originally 2/4/2020)   • MAMMOGRAM  01/10/2022   • HEMOGLOBIN A1C  04/26/2022   • LIPID PANEL  06/02/2022   • URINE MICROALBUMIN  10/19/2022   • TDAP/TD VACCINES (3 - Td or Tdap) 11/10/2024   • COLORECTAL CANCER SCREENING  05/14/2026   • HEPATITIS C SCREENING  Completed   • INFLUENZA VACCINE  Completed   • Hepatitis B  Aged Out       Current Meds:    Current Outpatient Medications:   •  albuterol (PROVENTIL) (2.5 MG/3ML) 0.083% nebulizer solution, Inhale the contents of 1 vial by nebulization Every 4 (Four) Hours As Needed for Wheezing., Disp: 75 mL, Rfl: 3  •  albuterol sulfate  (90 Base) MCG/ACT inhaler, Inhale 2 puffs Every 4 (Four) Hours As Needed for Wheezing., Disp: 18 g, Rfl: 1  •  aspirin (aspirin) 81 MG EC tablet, Take 1 tablet by mouth Daily., Disp: 60 tablet, Rfl: 5  •  atorvastatin (LIPITOR) 20 MG tablet, TAKE ONE TABLET BY MOUTH EVERY NIGHT AT BEDTIME, Disp: 30 tablet, Rfl: 1  •  Blood Glucose Monitoring Suppl ([employer] Blood Glucose Meter) w/Device kit, 1 each 3 (Three) Times a Day., Disp: 1 kit, Rfl: 3  •  bumetanide (BUMEX) 2 MG tablet, Take 1 tablet by mouth 4 (Four) Times a Week., Disp: 30 tablet, Rfl: 0  •  cetirizine (zyrTEC) 10 MG tablet, Take 1 tablet by mouth Daily., Disp: 30 tablet, Rfl: 3  •  clopidogrel (PLAVIX) 75 MG tablet, Take 1 tablet by mouth Daily., Disp: 30 tablet, Rfl: 5  •  Continuous Blood Gluc  (FreeStyle Jade 2 [place]) device, 1 each Continuous., Disp: 1 each, Rfl: 0  •  Continuous  Blood Gluc Sensor (FreeStyle Jade 2 Sensor) misc, 1 each Every 14 (Fourteen) Days., Disp: 2 each, Rfl: 11  •  cyclobenzaprine (FLEXERIL) 10 MG tablet, Take 1 tablet by mouth 2 (Two) Times a Day As Needed for Muscle Spasms., Disp: 60 tablet, Rfl: 5  •  DULoxetine (CYMBALTA) 20 MG capsule, TAKE 1 CAPSULE BY MOUTH DAILY., Disp: 30 capsule, Rfl: 2  •  EASY TOUCH PEN NEEDLES 31G X 8 MM misc, , Disp: , Rfl:   •  epoetin hubert-epbx (RETACRIT) 90630 UNIT/ML injection, Inject 1 mL under the skin into the appropriate area as directed 3 (Three) Times a Week. Indications: ESRD on Dialysis, Disp: 6.6 mL, Rfl: 0  •  ferrous sulfate (FeroSul) 325 (65 FE) MG tablet, Take 1 tablet by mouth Daily With Breakfast., Disp: 30 tablet, Rfl: 3  •  fluticasone (FLONASE) 50 MCG/ACT nasal spray, 2 sprays by Each Nare route Daily., Disp: 9.9 mL, Rfl: 0  •  gabapentin (NEURONTIN) 400 MG capsule, Take 1 capsule by mouth 3 (Three) Times a Day., Disp: 90 capsule, Rfl: 0  •  glucose blood test strip, Use as instructed, Disp: 100 each, Rfl: 12  •  insulin aspart (novoLOG FLEXPEN) 100 UNIT/ML solution pen-injector sc pen, Inject 30 Units under the skin into the appropriate area as directed 3 (Three) Times a Day With Meals., Disp: 30 mL, Rfl: 12  •  insulin detemir (LEVEMIR) 100 UNIT/ML injection, Inject 60 Units under the skin into the appropriate area as directed Daily., Disp: 2 mL, Rfl: 12  •  Insulin Pen Needle (NovoFine) 30G X 8 MM misc, As directed 4 times daily, Disp: 100 each, Rfl: 11  •  Insulin Pen Needle 31G X 8 MM misc, Use to inject insulin 4 (Four) Times a Day as directed., Disp: 120 each, Rfl: 12  •  ipratropium-albuterol (DUO-NEB) 0.5-2.5 mg/3 ml nebulizer, Take 3 mL by nebulization 4 (Four) Times a Day., Disp: , Rfl:   •  isosorbide mononitrate (IMDUR) 30 MG 24 hr tablet, Take 1 tablet by mouth Every Morning., Disp: 30 tablet, Rfl: 0  •  levothyroxine (SYNTHROID, LEVOTHROID) 25 MCG tablet, Take 25 mcg by mouth Daily., Disp: , Rfl:   •   lidocaine (LIDODERM) 5 %, APPLY TO THE AFFECTED AREA(S) TOPICALLY TWO TIMES A DAY. REMOVE NIGHTLY, Disp: 30 patch, Rfl: 1  •  lisinopril (PRINIVIL,ZESTRIL) 5 MG tablet, Take 1 tablet by mouth Daily., Disp: 30 tablet, Rfl: 0  •  metoprolol tartrate (LOPRESSOR) 50 MG tablet, TAKE ONE TABLET BY MOUTH TWO TIMES A DAY. HOLD IF PULSE IS LESS THAN 60, Disp: 60 tablet, Rfl: 1  •  montelukast (SINGULAIR) 10 MG tablet, Take 1 tablet by mouth Every Night., Disp: 30 tablet, Rfl: 5  •  nitroglycerin (NITROSTAT) 0.4 MG SL tablet, Place 0.4 mg under the tongue Every 5 (Five) Minutes As Needed for Chest Pain (x 3 doses)., Disp: , Rfl:   •  nystatin (MYCOSTATIN) 113014 UNIT/GM powder, Apply  topically to the appropriate area as directed 3 (Three) Times a Day., Disp: 15 g, Rfl: 1  •  ondansetron ODT (Zofran ODT) 4 MG disintegrating tablet, Place 1 tablet on the tongue Every 8 (Eight) Hours As Needed for Nausea or Vomiting., Disp: 30 tablet, Rfl: 0  •  oxybutynin XL (DITROPAN-XL) 5 MG 24 hr tablet, Take 1 tablet by mouth Daily., Disp: 90 tablet, Rfl: 1  •  pantoprazole (PROTONIX) 40 MG EC tablet, Take 1 tablet by mouth Every Night., Disp: 30 tablet, Rfl: 5  •  promethazine (PHENERGAN) 25 MG tablet, Take 25 mg by mouth Every 6 (Six) Hours As Needed., Disp: , Rfl:   •  ranolazine (RANEXA) 500 MG 12 hr tablet, Take 1 tablet by mouth Every 12 (Twelve) Hours., Disp: 30 tablet, Rfl: 0  •  sennosides-docusate (PERICOLACE) 8.6-50 MG per tablet, Take 2 tablets by mouth 2 (Two) Times a Day., Disp: 60 tablet, Rfl: 2  •  acetaminophen (Tylenol 8 Hour) 650 MG 8 hr tablet, Take 1 tablet by mouth Every 8 (Eight) Hours As Needed for Mild Pain ., Disp: 90 tablet, Rfl: 0  •  gabapentin (NEURONTIN) 800 MG tablet, , Disp: , Rfl:     Allergies:  Adhesive tape and Other    Family Hx:  Family History   Problem Relation Age of Onset   • Heart disease Mother    • Heart disease Father    • Heart disease Sister    • Heart disease Brother         Social  "History:  Social History     Socioeconomic History   • Marital status:      Spouse name: wesley dumont   Tobacco Use   • Smoking status: Former Smoker     Packs/day: 0.25     Years: 46.00     Pack years: 11.50     Types: Cigarettes   • Smokeless tobacco: Never Used   • Tobacco comment: only smoking 5 a day - quit april 23 2021   Substance and Sexual Activity   • Alcohol use: No   • Drug use: Not Currently     Types: LSD, Marijuana, Methamphetamines   • Sexual activity: Not Currently       Review of Systems  Review of Systems    Objective:     /82   Pulse 68   Temp 97.1 °F (36.2 °C)   Ht 157.5 cm (62\")   Wt 130 kg (286 lb 4.8 oz)   LMP  (LMP Unknown)   SpO2 98% Comment: oxygen level 3  BMI 52.36 kg/m²   Physical Exam General appearance alert obese no distress please see Dr. Mauricio's note for more detailed information regarding physical exam findings  Lab Review  Results for orders placed or performed during the hospital encounter of 11/08/21   COVID-19 and FLU A/B PCR - Swab, Nasopharynx    Specimen: Nasopharynx; Swab   Result Value Ref Range    COVID19 Not Detected Not Detected - Ref. Range    Influenza A PCR Not Detected Not Detected    Influenza B PCR Not Detected Not Detected   CRE Screen by PCR - Swab, Large Intestine, Rectum    Specimen: Large Intestine, Rectum; Swab   Result Value Ref Range    CRE SCREEN Not Detected Not Detected, Invalid    OXA 48 Strain Not Detected     IMP STRAIN Not Detected     VIM STRAIN Not Detected     NDM Strain Not Detected     KPC Strain Not Detected    COVID-19, [facility] IN-HOUSE, NP SWAB IN TRANSPORT MEDIA 8-10 HR TAT - Swab, Nasopharynx    Specimen: Nasopharynx; Swab   Result Value Ref Range    COVID19 Not Detected Not Detected - Ref. Range   COVID-19, [facility] IN-HOUSE, NP SWAB IN TRANSPORT MEDIA 8-10 HR TAT - Swab, Nasopharynx    Specimen: Nasopharynx; Swab   Result Value Ref Range    COVID19 Not Detected Not Detected - Ref. Range   Troponin    Specimen: " Blood   Result Value Ref Range    Troponin T 0.025 0.000 - 0.030 ng/mL   Troponin    Specimen: Blood   Result Value Ref Range    Troponin T 0.026 0.000 - 0.030 ng/mL   Comprehensive Metabolic Panel    Specimen: Blood   Result Value Ref Range    Glucose 441 (C) 65 - 99 mg/dL    BUN 48 (H) 8 - 23 mg/dL    Creatinine 3.27 (H) 0.57 - 1.00 mg/dL    Sodium 131 (L) 136 - 145 mmol/L    Potassium 4.1 3.5 - 5.2 mmol/L    Chloride 93 (L) 98 - 107 mmol/L    CO2 25.0 22.0 - 29.0 mmol/L    Calcium 8.7 8.6 - 10.5 mg/dL    Total Protein 7.1 6.0 - 8.5 g/dL    Albumin 3.30 (L) 3.50 - 5.20 g/dL    ALT (SGPT) 8 1 - 33 U/L    AST (SGOT) 11 1 - 32 U/L    Alkaline Phosphatase 134 (H) 39 - 117 U/L    Total Bilirubin 0.2 0.0 - 1.2 mg/dL    eGFR Non African Amer 14 (L) >60 mL/min/1.73    eGFR  African Amer      Globulin 3.8 gm/dL    A/G Ratio 0.9 g/dL    BUN/Creatinine Ratio 14.7 7.0 - 25.0    Anion Gap 13.0 5.0 - 15.0 mmol/L   BNP    Specimen: Blood   Result Value Ref Range    proBNP 1,386.0 (H) 0.0 - 900.0 pg/mL   CBC Auto Differential    Specimen: Blood   Result Value Ref Range    WBC 8.14 3.40 - 10.80 10*3/mm3    RBC 2.90 (L) 3.77 - 5.28 10*6/mm3    Hemoglobin 7.9 (L) 12.0 - 15.9 g/dL    Hematocrit 24.7 (L) 34.0 - 46.6 %    MCV 85.2 79.0 - 97.0 fL    MCH 27.2 26.6 - 33.0 pg    MCHC 32.0 31.5 - 35.7 g/dL    RDW 14.8 12.3 - 15.4 %    RDW-SD 45.1 37.0 - 54.0 fl    MPV 8.7 6.0 - 12.0 fL    Platelets 252 140 - 450 10*3/mm3    Neutrophil % 61.6 42.7 - 76.0 %    Lymphocyte % 26.3 19.6 - 45.3 %    Monocyte % 6.9 5.0 - 12.0 %    Eosinophil % 3.6 0.3 - 6.2 %    Basophil % 0.7 0.0 - 1.5 %    Immature Grans % 0.9 (H) 0.0 - 0.5 %    Neutrophils, Absolute 5.02 1.70 - 7.00 10*3/mm3    Lymphocytes, Absolute 2.14 0.70 - 3.10 10*3/mm3    Monocytes, Absolute 0.56 0.10 - 0.90 10*3/mm3    Eosinophils, Absolute 0.29 0.00 - 0.40 10*3/mm3    Basophils, Absolute 0.06 0.00 - 0.20 10*3/mm3    Immature Grans, Absolute 0.07 (H) 0.00 - 0.05 10*3/mm3    nRBC 0.0 0.0 -  0.2 /100 WBC   Troponin    Specimen: Blood   Result Value Ref Range    Troponin T 0.024 0.000 - 0.030 ng/mL   Troponin    Specimen: Blood   Result Value Ref Range    Troponin T 0.021 0.000 - 0.030 ng/mL   Comprehensive Metabolic Panel    Specimen: Blood   Result Value Ref Range    Glucose 215 (H) 65 - 99 mg/dL    BUN 51 (H) 8 - 23 mg/dL    Creatinine 3.16 (H) 0.57 - 1.00 mg/dL    Sodium 132 (L) 136 - 145 mmol/L    Potassium 4.2 3.5 - 5.2 mmol/L    Chloride 95 (L) 98 - 107 mmol/L    CO2 25.0 22.0 - 29.0 mmol/L    Calcium 9.1 8.6 - 10.5 mg/dL    Total Protein 7.4 6.0 - 8.5 g/dL    Albumin 3.50 3.50 - 5.20 g/dL    ALT (SGPT) 9 1 - 33 U/L    AST (SGOT) 13 1 - 32 U/L    Alkaline Phosphatase 127 (H) 39 - 117 U/L    Total Bilirubin 0.2 0.0 - 1.2 mg/dL    eGFR Non African Amer 15 (L) >60 mL/min/1.73    Globulin 3.9 gm/dL    A/G Ratio 0.9 g/dL    BUN/Creatinine Ratio 16.1 7.0 - 25.0    Anion Gap 12.0 5.0 - 15.0 mmol/L   CBC Auto Differential    Specimen: Blood   Result Value Ref Range    WBC 10.01 3.40 - 10.80 10*3/mm3    RBC 3.11 (L) 3.77 - 5.28 10*6/mm3    Hemoglobin 8.4 (L) 12.0 - 15.9 g/dL    Hematocrit 26.3 (L) 34.0 - 46.6 %    MCV 84.6 79.0 - 97.0 fL    MCH 27.0 26.6 - 33.0 pg    MCHC 31.9 31.5 - 35.7 g/dL    RDW 14.8 12.3 - 15.4 %    RDW-SD 44.5 37.0 - 54.0 fl    MPV 9.0 6.0 - 12.0 fL    Platelets 265 140 - 450 10*3/mm3    Neutrophil % 70.6 42.7 - 76.0 %    Lymphocyte % 17.8 (L) 19.6 - 45.3 %    Monocyte % 6.8 5.0 - 12.0 %    Eosinophil % 2.8 0.3 - 6.2 %    Basophil % 1.2 0.0 - 1.5 %    Immature Grans % 0.8 (H) 0.0 - 0.5 %    Neutrophils, Absolute 7.07 (H) 1.70 - 7.00 10*3/mm3    Lymphocytes, Absolute 1.78 0.70 - 3.10 10*3/mm3    Monocytes, Absolute 0.68 0.10 - 0.90 10*3/mm3    Eosinophils, Absolute 0.28 0.00 - 0.40 10*3/mm3    Basophils, Absolute 0.12 0.00 - 0.20 10*3/mm3    Immature Grans, Absolute 0.08 (H) 0.00 - 0.05 10*3/mm3    nRBC 0.0 0.0 - 0.2 /100 WBC   Comprehensive Metabolic Panel    Specimen: Blood    Result Value Ref Range    Glucose 293 (H) 65 - 99 mg/dL    BUN 47 (H) 8 - 23 mg/dL    Creatinine 3.49 (H) 0.57 - 1.00 mg/dL    Sodium 127 (L) 136 - 145 mmol/L    Potassium 4.5 3.5 - 5.2 mmol/L    Chloride 90 (L) 98 - 107 mmol/L    CO2 23.0 22.0 - 29.0 mmol/L    Calcium 8.9 8.6 - 10.5 mg/dL    Total Protein 7.8 6.0 - 8.5 g/dL    Albumin 3.40 (L) 3.50 - 5.20 g/dL    ALT (SGPT) 10 1 - 33 U/L    AST (SGOT) 11 1 - 32 U/L    Alkaline Phosphatase 151 (H) 39 - 117 U/L    Total Bilirubin 0.2 0.0 - 1.2 mg/dL    eGFR Non African Amer 13 (L) >60 mL/min/1.73    eGFR  African Amer      Globulin 4.4 gm/dL    A/G Ratio 0.8 g/dL    BUN/Creatinine Ratio 13.5 7.0 - 25.0    Anion Gap 14.0 5.0 - 15.0 mmol/L   CBC Auto Differential    Specimen: Blood   Result Value Ref Range    WBC 8.41 3.40 - 10.80 10*3/mm3    RBC 3.15 (L) 3.77 - 5.28 10*6/mm3    Hemoglobin 8.5 (L) 12.0 - 15.9 g/dL    Hematocrit 26.6 (L) 34.0 - 46.6 %    MCV 84.4 79.0 - 97.0 fL    MCH 27.0 26.6 - 33.0 pg    MCHC 32.0 31.5 - 35.7 g/dL    RDW 15.3 12.3 - 15.4 %    RDW-SD 45.5 37.0 - 54.0 fl    MPV 8.9 6.0 - 12.0 fL    Platelets 288 140 - 450 10*3/mm3    Neutrophil % 63.2 42.7 - 76.0 %    Lymphocyte % 22.7 19.6 - 45.3 %    Monocyte % 8.1 5.0 - 12.0 %    Eosinophil % 3.8 0.3 - 6.2 %    Basophil % 1.1 0.0 - 1.5 %    Immature Grans % 1.1 (H) 0.0 - 0.5 %    Neutrophils, Absolute 5.32 1.70 - 7.00 10*3/mm3    Lymphocytes, Absolute 1.91 0.70 - 3.10 10*3/mm3    Monocytes, Absolute 0.68 0.10 - 0.90 10*3/mm3    Eosinophils, Absolute 0.32 0.00 - 0.40 10*3/mm3    Basophils, Absolute 0.09 0.00 - 0.20 10*3/mm3    Immature Grans, Absolute 0.09 (H) 0.00 - 0.05 10*3/mm3    nRBC 0.0 0.0 - 0.2 /100 WBC   Comprehensive Metabolic Panel    Specimen: Blood   Result Value Ref Range    Glucose 437 (C) 65 - 99 mg/dL    BUN 56 (H) 8 - 23 mg/dL    Creatinine 4.08 (H) 0.57 - 1.00 mg/dL    Sodium 125 (L) 136 - 145 mmol/L    Potassium 4.6 3.5 - 5.2 mmol/L    Chloride 88 (L) 98 - 107 mmol/L    CO2 21.0  (L) 22.0 - 29.0 mmol/L    Calcium 8.8 8.6 - 10.5 mg/dL    Total Protein 7.6 6.0 - 8.5 g/dL    Albumin 3.30 (L) 3.50 - 5.20 g/dL    ALT (SGPT) 9 1 - 33 U/L    AST (SGOT) 9 1 - 32 U/L    Alkaline Phosphatase 177 (H) 39 - 117 U/L    Total Bilirubin <0.2 0.0 - 1.2 mg/dL    eGFR Non African Amer 11 (L) >60 mL/min/1.73    eGFR  African Amer      Globulin 4.3 gm/dL    A/G Ratio 0.8 g/dL    BUN/Creatinine Ratio 13.7 7.0 - 25.0    Anion Gap 16.0 (H) 5.0 - 15.0 mmol/L   CBC Auto Differential    Specimen: Blood   Result Value Ref Range    WBC 8.15 3.40 - 10.80 10*3/mm3    RBC 3.00 (L) 3.77 - 5.28 10*6/mm3    Hemoglobin 8.2 (L) 12.0 - 15.9 g/dL    Hematocrit 25.0 (L) 34.0 - 46.6 %    MCV 83.3 79.0 - 97.0 fL    MCH 27.3 26.6 - 33.0 pg    MCHC 32.8 31.5 - 35.7 g/dL    RDW 15.3 12.3 - 15.4 %    RDW-SD 45.6 37.0 - 54.0 fl    MPV 8.7 6.0 - 12.0 fL    Platelets 267 140 - 450 10*3/mm3    Neutrophil % 64.6 42.7 - 76.0 %    Lymphocyte % 21.2 19.6 - 45.3 %    Monocyte % 8.3 5.0 - 12.0 %    Eosinophil % 3.9 0.3 - 6.2 %    Basophil % 1.1 0.0 - 1.5 %    Immature Grans % 0.9 (H) 0.0 - 0.5 %    Neutrophils, Absolute 5.26 1.70 - 7.00 10*3/mm3    Lymphocytes, Absolute 1.73 0.70 - 3.10 10*3/mm3    Monocytes, Absolute 0.68 0.10 - 0.90 10*3/mm3    Eosinophils, Absolute 0.32 0.00 - 0.40 10*3/mm3    Basophils, Absolute 0.09 0.00 - 0.20 10*3/mm3    Immature Grans, Absolute 0.07 (H) 0.00 - 0.05 10*3/mm3    nRBC 0.0 0.0 - 0.2 /100 WBC   Comprehensive Metabolic Panel    Specimen: Blood   Result Value Ref Range    Glucose 391 (H) 65 - 99 mg/dL    BUN 44 (H) 8 - 23 mg/dL    Creatinine 3.49 (H) 0.57 - 1.00 mg/dL    Sodium 127 (L) 136 - 145 mmol/L    Potassium 4.9 3.5 - 5.2 mmol/L    Chloride 92 (L) 98 - 107 mmol/L    CO2 20.0 (L) 22.0 - 29.0 mmol/L    Calcium 8.9 8.6 - 10.5 mg/dL    Total Protein 8.0 6.0 - 8.5 g/dL    Albumin 3.40 (L) 3.50 - 5.20 g/dL    ALT (SGPT) 8 1 - 33 U/L    AST (SGOT) 11 1 - 32 U/L    Alkaline Phosphatase 145 (H) 39 - 117 U/L     Total Bilirubin <0.2 0.0 - 1.2 mg/dL    eGFR Non African Amer 13 (L) >60 mL/min/1.73    eGFR  African Amer      Globulin 4.6 gm/dL    A/G Ratio 0.7 g/dL    BUN/Creatinine Ratio 12.6 7.0 - 25.0    Anion Gap 15.0 5.0 - 15.0 mmol/L   CBC Auto Differential    Specimen: Blood   Result Value Ref Range    WBC 7.06 3.40 - 10.80 10*3/mm3    RBC 3.06 (L) 3.77 - 5.28 10*6/mm3    Hemoglobin 8.4 (L) 12.0 - 15.9 g/dL    Hematocrit 25.0 (L) 34.0 - 46.6 %    MCV 81.7 79.0 - 97.0 fL    MCH 27.5 26.6 - 33.0 pg    MCHC 33.6 31.5 - 35.7 g/dL    RDW 15.2 12.3 - 15.4 %    RDW-SD 44.0 37.0 - 54.0 fl    MPV 8.7 6.0 - 12.0 fL    Platelets 265 140 - 450 10*3/mm3    Neutrophil % 63.0 42.7 - 76.0 %    Lymphocyte % 23.1 19.6 - 45.3 %    Monocyte % 7.9 5.0 - 12.0 %    Eosinophil % 3.8 0.3 - 6.2 %    Basophil % 1.1 0.0 - 1.5 %    Immature Grans % 1.1 (H) 0.0 - 0.5 %    Neutrophils, Absolute 4.44 1.70 - 7.00 10*3/mm3    Lymphocytes, Absolute 1.63 0.70 - 3.10 10*3/mm3    Monocytes, Absolute 0.56 0.10 - 0.90 10*3/mm3    Eosinophils, Absolute 0.27 0.00 - 0.40 10*3/mm3    Basophils, Absolute 0.08 0.00 - 0.20 10*3/mm3    Immature Grans, Absolute 0.08 (H) 0.00 - 0.05 10*3/mm3    nRBC 0.0 0.0 - 0.2 /100 WBC   Comprehensive Metabolic Panel    Specimen: Blood   Result Value Ref Range    Glucose 363 (H) 65 - 99 mg/dL    BUN 52 (H) 8 - 23 mg/dL    Creatinine 4.06 (H) 0.57 - 1.00 mg/dL    Sodium 127 (L) 136 - 145 mmol/L    Potassium 4.5 3.5 - 5.2 mmol/L    Chloride 92 (L) 98 - 107 mmol/L    CO2 22.0 22.0 - 29.0 mmol/L    Calcium 9.1 8.6 - 10.5 mg/dL    Total Protein 7.2 6.0 - 8.5 g/dL    Albumin 3.10 (L) 3.50 - 5.20 g/dL    ALT (SGPT) 9 1 - 33 U/L    AST (SGOT) 10 1 - 32 U/L    Alkaline Phosphatase 160 (H) 39 - 117 U/L    Total Bilirubin 0.2 0.0 - 1.2 mg/dL    eGFR Non African Amer 11 (L) >60 mL/min/1.73    eGFR  African Amer      Globulin 4.1 gm/dL    A/G Ratio 0.8 g/dL    BUN/Creatinine Ratio 12.8 7.0 - 25.0    Anion Gap 13.0 5.0 - 15.0 mmol/L   Protime-INR     Specimen: Blood   Result Value Ref Range    Protime 13.0 11.1 - 15.3 Seconds    INR 0.99 0.80 - 1.20   CBC Auto Differential    Specimen: Blood   Result Value Ref Range    WBC 7.65 3.40 - 10.80 10*3/mm3    RBC 2.82 (L) 3.77 - 5.28 10*6/mm3    Hemoglobin 7.7 (L) 12.0 - 15.9 g/dL    Hematocrit 23.1 (L) 34.0 - 46.6 %    MCV 81.9 79.0 - 97.0 fL    MCH 27.3 26.6 - 33.0 pg    MCHC 33.3 31.5 - 35.7 g/dL    RDW 14.9 12.3 - 15.4 %    RDW-SD 43.9 37.0 - 54.0 fl    MPV 8.9 6.0 - 12.0 fL    Platelets 273 140 - 450 10*3/mm3    Neutrophil % 62.7 42.7 - 76.0 %    Lymphocyte % 23.9 19.6 - 45.3 %    Monocyte % 6.8 5.0 - 12.0 %    Eosinophil % 3.8 0.3 - 6.2 %    Basophil % 1.0 0.0 - 1.5 %    Immature Grans % 1.8 (H) 0.0 - 0.5 %    Neutrophils, Absolute 4.79 1.70 - 7.00 10*3/mm3    Lymphocytes, Absolute 1.83 0.70 - 3.10 10*3/mm3    Monocytes, Absolute 0.52 0.10 - 0.90 10*3/mm3    Eosinophils, Absolute 0.29 0.00 - 0.40 10*3/mm3    Basophils, Absolute 0.08 0.00 - 0.20 10*3/mm3    Immature Grans, Absolute 0.14 (H) 0.00 - 0.05 10*3/mm3    nRBC 0.0 0.0 - 0.2 /100 WBC   Basic Metabolic Panel    Specimen: Blood   Result Value Ref Range    Glucose 214 (H) 65 - 99 mg/dL    BUN 21 8 - 23 mg/dL    Creatinine 1.95 (H) 0.57 - 1.00 mg/dL    Sodium 133 (L) 136 - 145 mmol/L    Potassium 4.0 3.5 - 5.2 mmol/L    Chloride 95 (L) 98 - 107 mmol/L    CO2 26.0 22.0 - 29.0 mmol/L    Calcium 9.0 8.6 - 10.5 mg/dL    eGFR Non African Amer 26 (L) >60 mL/min/1.73    BUN/Creatinine Ratio 10.8 7.0 - 25.0    Anion Gap 12.0 5.0 - 15.0 mmol/L   Basic Metabolic Panel    Specimen: Blood   Result Value Ref Range    Glucose 92 65 - 99 mg/dL    BUN 23 8 - 23 mg/dL    Creatinine 2.61 (H) 0.57 - 1.00 mg/dL    Sodium 133 (L) 136 - 145 mmol/L    Potassium 4.0 3.5 - 5.2 mmol/L    Chloride 96 (L) 98 - 107 mmol/L    CO2 28.0 22.0 - 29.0 mmol/L    Calcium 9.2 8.6 - 10.5 mg/dL    eGFR Non African Amer 19 (L) >60 mL/min/1.73    BUN/Creatinine Ratio 8.8 7.0 - 25.0    Anion Gap 9.0  5.0 - 15.0 mmol/L   Hemoglobin & Hematocrit, Blood    Specimen: Blood   Result Value Ref Range    Hemoglobin 8.0 (L) 12.0 - 15.9 g/dL    Hematocrit 24.3 (L) 34.0 - 46.6 %   Hemoglobin & Hematocrit, Blood    Specimen: Blood   Result Value Ref Range    Hemoglobin 7.5 (L) 12.0 - 15.9 g/dL    Hematocrit 23.1 (L) 34.0 - 46.6 %   Comprehensive Metabolic Panel    Specimen: Blood   Result Value Ref Range    Glucose 136 (H) 65 - 99 mg/dL    BUN 26 (H) 8 - 23 mg/dL    Creatinine 2.77 (H) 0.57 - 1.00 mg/dL    Sodium 135 (L) 136 - 145 mmol/L    Potassium 4.3 3.5 - 5.2 mmol/L    Chloride 96 (L) 98 - 107 mmol/L    CO2 26.0 22.0 - 29.0 mmol/L    Calcium 9.1 8.6 - 10.5 mg/dL    Total Protein 7.7 6.0 - 8.5 g/dL    Albumin 3.50 3.50 - 5.20 g/dL    ALT (SGPT) 8 1 - 33 U/L    AST (SGOT) 17 1 - 32 U/L    Alkaline Phosphatase 114 39 - 117 U/L    Total Bilirubin 0.2 0.0 - 1.2 mg/dL    eGFR Non African Amer 17 (L) >60 mL/min/1.73    Globulin 4.2 gm/dL    A/G Ratio 0.8 g/dL    BUN/Creatinine Ratio 9.4 7.0 - 25.0    Anion Gap 13.0 5.0 - 15.0 mmol/L   CBC Auto Differential    Specimen: Blood   Result Value Ref Range    WBC 7.56 3.40 - 10.80 10*3/mm3    RBC 2.69 (L) 3.77 - 5.28 10*6/mm3    Hemoglobin 7.3 (L) 12.0 - 15.9 g/dL    Hematocrit 22.5 (L) 34.0 - 46.6 %    MCV 83.6 79.0 - 97.0 fL    MCH 27.1 26.6 - 33.0 pg    MCHC 32.4 31.5 - 35.7 g/dL    RDW 15.6 (H) 12.3 - 15.4 %    RDW-SD 46.2 37.0 - 54.0 fl    MPV 8.9 6.0 - 12.0 fL    Platelets 312 140 - 450 10*3/mm3    Neutrophil % 61.0 42.7 - 76.0 %    Lymphocyte % 24.6 19.6 - 45.3 %    Monocyte % 7.9 5.0 - 12.0 %    Eosinophil % 4.1 0.3 - 6.2 %    Basophil % 1.1 0.0 - 1.5 %    Immature Grans % 1.3 (H) 0.0 - 0.5 %    Neutrophils, Absolute 4.61 1.70 - 7.00 10*3/mm3    Lymphocytes, Absolute 1.86 0.70 - 3.10 10*3/mm3    Monocytes, Absolute 0.60 0.10 - 0.90 10*3/mm3    Eosinophils, Absolute 0.31 0.00 - 0.40 10*3/mm3    Basophils, Absolute 0.08 0.00 - 0.20 10*3/mm3    Immature Grans, Absolute 0.10 (H)  0.00 - 0.05 10*3/mm3    nRBC 0.0 0.0 - 0.2 /100 WBC   Hemoglobin & Hematocrit, Blood    Specimen: Blood   Result Value Ref Range    Hemoglobin 7.0 (L) 12.0 - 15.9 g/dL    Hematocrit 22.0 (L) 34.0 - 46.6 %   Comprehensive Metabolic Panel    Specimen: Blood   Result Value Ref Range    Glucose 212 (H) 65 - 99 mg/dL    BUN 36 (H) 8 - 23 mg/dL    Creatinine 4.35 (H) 0.57 - 1.00 mg/dL    Sodium 127 (L) 136 - 145 mmol/L    Potassium 4.6 3.5 - 5.2 mmol/L    Chloride 91 (L) 98 - 107 mmol/L    CO2 22.0 22.0 - 29.0 mmol/L    Calcium 9.0 8.6 - 10.5 mg/dL    Total Protein 7.7 6.0 - 8.5 g/dL    Albumin 3.30 (L) 3.50 - 5.20 g/dL    ALT (SGPT) 7 1 - 33 U/L    AST (SGOT) 15 1 - 32 U/L    Alkaline Phosphatase 121 (H) 39 - 117 U/L    Total Bilirubin 0.2 0.0 - 1.2 mg/dL    eGFR Non African Amer 10 (L) >60 mL/min/1.73    eGFR  African Amer      Globulin 4.4 gm/dL    A/G Ratio 0.8 g/dL    BUN/Creatinine Ratio 8.3 7.0 - 25.0    Anion Gap 14.0 5.0 - 15.0 mmol/L   Iron Profile    Specimen: Blood   Result Value Ref Range    Iron 36 (L) 37 - 145 mcg/dL    Iron Saturation 14 (L) 20 - 50 %    Transferrin 171 (L) 200 - 360 mg/dL    TIBC 255 (L) 298 - 536 mcg/dL   Ferritin    Specimen: Blood   Result Value Ref Range    Ferritin 152.60 (H) 13.00 - 150.00 ng/mL   CBC Auto Differential    Specimen: Blood   Result Value Ref Range    WBC 9.11 3.40 - 10.80 10*3/mm3    RBC 3.10 (L) 3.77 - 5.28 10*6/mm3    Hemoglobin 8.6 (L) 12.0 - 15.9 g/dL    Hematocrit 25.9 (L) 34.0 - 46.6 %    MCV 83.5 79.0 - 97.0 fL    MCH 27.7 26.6 - 33.0 pg    MCHC 33.2 31.5 - 35.7 g/dL    RDW 14.8 12.3 - 15.4 %    RDW-SD 44.6 37.0 - 54.0 fl    MPV 8.5 6.0 - 12.0 fL    Platelets 241 140 - 450 10*3/mm3    Neutrophil % 66.0 42.7 - 76.0 %    Lymphocyte % 20.2 19.6 - 45.3 %    Monocyte % 7.8 5.0 - 12.0 %    Eosinophil % 3.7 0.3 - 6.2 %    Basophil % 0.8 0.0 - 1.5 %    Immature Grans % 1.5 (H) 0.0 - 0.5 %    Neutrophils, Absolute 6.01 1.70 - 7.00 10*3/mm3    Lymphocytes, Absolute 1.84  0.70 - 3.10 10*3/mm3    Monocytes, Absolute 0.71 0.10 - 0.90 10*3/mm3    Eosinophils, Absolute 0.34 0.00 - 0.40 10*3/mm3    Basophils, Absolute 0.07 0.00 - 0.20 10*3/mm3    Immature Grans, Absolute 0.14 (H) 0.00 - 0.05 10*3/mm3    nRBC 0.0 0.0 - 0.2 /100 WBC   Hemoglobin & Hematocrit, Blood    Specimen: Blood   Result Value Ref Range    Hemoglobin 8.5 (L) 12.0 - 15.9 g/dL    Hematocrit 25.6 (L) 34.0 - 46.6 %   Stress Test With Myocardial Perfusion Two Day   Result Value Ref Range    Target HR (85%) 134 bpm    Max. Pred. HR (100%) 158 bpm    BH CV REST NUCLEAR ISOTOPE DOSE 29.3 mCi    BH CV STRESS NUCLEAR ISOTOPE DOSE 35.4 mCi    Nuc Stress EF 60 %   POC Glucose Once    Specimen: Blood   Result Value Ref Range    Glucose 392 (H) 70 - 130 mg/dL   POC Glucose Once    Specimen: Blood   Result Value Ref Range    Glucose 206 (H) 70 - 130 mg/dL   POC Glucose Once    Specimen: Blood   Result Value Ref Range    Glucose 341 (H) 70 - 130 mg/dL   POC Glucose Once    Specimen: Blood   Result Value Ref Range    Glucose 236 (H) 70 - 130 mg/dL   POC Glucose Once    Specimen: Blood   Result Value Ref Range    Glucose 304 (H) 70 - 130 mg/dL   POC Glucose Once    Specimen: Blood   Result Value Ref Range    Glucose 290 (H) 70 - 130 mg/dL   POC Glucose Once    Specimen: Blood   Result Value Ref Range    Glucose 343 (H) 70 - 130 mg/dL   POC Glucose Once    Specimen: Blood   Result Value Ref Range    Glucose 226 (H) 70 - 130 mg/dL   POC Glucose Once    Specimen: Blood   Result Value Ref Range    Glucose 211 (H) 70 - 130 mg/dL   POC Glucose Once    Specimen: Blood   Result Value Ref Range    Glucose 448 (C) 70 - 130 mg/dL   POC Glucose Once    Specimen: Blood   Result Value Ref Range    Glucose 432 (C) 70 - 130 mg/dL   POC Glucose Once    Specimen: Blood   Result Value Ref Range    Glucose 292 (H) 70 - 130 mg/dL   POC Glucose Once    Specimen: Blood   Result Value Ref Range    Glucose 385 (H) 70 - 130 mg/dL   POC Glucose Once    Specimen:  Blood   Result Value Ref Range    Glucose 395 (H) 70 - 130 mg/dL   POC Glucose Once    Specimen: Blood   Result Value Ref Range    Glucose 253 (H) 70 - 130 mg/dL   POC Glucose Once    Specimen: Blood   Result Value Ref Range    Glucose 431 (C) 70 - 130 mg/dL   POC Glucose Once    Specimen: Blood   Result Value Ref Range    Glucose 319 (H) 70 - 130 mg/dL   POC Glucose Once    Specimen: Blood   Result Value Ref Range    Glucose 402 (C) 70 - 130 mg/dL   POC Glucose Once    Specimen: Blood   Result Value Ref Range    Glucose 413 (C) 70 - 130 mg/dL   POC Glucose Once    Specimen: Blood   Result Value Ref Range    Glucose 231 (H) 70 - 130 mg/dL   POC Glucose Once    Specimen: Blood   Result Value Ref Range    Glucose 192 (H) 70 - 130 mg/dL   POC Glucose Once    Specimen: Blood   Result Value Ref Range    Glucose 187 (H) 70 - 130 mg/dL   POC Glucose Once    Specimen: Blood   Result Value Ref Range    Glucose 201 (H) 70 - 130 mg/dL   POC Glucose Once    Specimen: Blood   Result Value Ref Range    Glucose 213 (H) 70 - 130 mg/dL   POC Glucose Once    Specimen: Blood   Result Value Ref Range    Glucose 197 (H) 70 - 130 mg/dL   POC Glucose Once    Specimen: Blood   Result Value Ref Range    Glucose 164 (H) 70 - 130 mg/dL   POC Glucose Once    Specimen: Blood   Result Value Ref Range    Glucose 134 (H) 70 - 130 mg/dL   POC Glucose Once    Specimen: Blood   Result Value Ref Range    Glucose 104 70 - 130 mg/dL   POC Glucose Once    Specimen: Blood   Result Value Ref Range    Glucose 98 70 - 130 mg/dL   POC Glucose Once    Specimen: Blood   Result Value Ref Range    Glucose 110 70 - 130 mg/dL   POC Glucose Once    Specimen: Blood   Result Value Ref Range    Glucose 109 70 - 130 mg/dL   POC Glucose Once    Specimen: Blood   Result Value Ref Range    Glucose 115 70 - 130 mg/dL   POC Glucose Once    Specimen: Blood   Result Value Ref Range    Glucose 142 (H) 70 - 130 mg/dL   POC Glucose Once    Specimen: Blood   Result Value Ref Range     Glucose 158 (H) 70 - 130 mg/dL   POC Glucose Once    Specimen: Blood   Result Value Ref Range    Glucose 187 (H) 70 - 130 mg/dL   POC Glucose Once    Specimen: Blood   Result Value Ref Range    Glucose 151 (H) 70 - 130 mg/dL   POC Glucose Once    Specimen: Blood   Result Value Ref Range    Glucose 254 (H) 70 - 130 mg/dL   POC Glucose Once    Specimen: Blood   Result Value Ref Range    Glucose 387 (H) 70 - 130 mg/dL   POC Glucose Once    Specimen: Blood   Result Value Ref Range    Glucose 135 (H) 70 - 130 mg/dL   POC Glucose Once    Specimen: Blood   Result Value Ref Range    Glucose 277 (H) 70 - 130 mg/dL   POC Glucose Once    Specimen: Blood   Result Value Ref Range    Glucose 173 (H) 70 - 130 mg/dL   POC Glucose Once    Specimen: Blood   Result Value Ref Range    Glucose 441 (C) 70 - 130 mg/dL   ECG 12 Lead   Result Value Ref Range    QT Interval 496 ms    QTC Interval 550 ms   ECG 12 Lead   Result Value Ref Range    QT Interval 432 ms    QTC Interval 545 ms   Type & Screen    Specimen: Blood   Result Value Ref Range    ABO Type O     RH type Positive     Antibody Screen Negative     T&S Expiration Date 11/16/2021 11:59:59 PM    PREVIOUS HISTORY    Specimen: Blood   Result Value Ref Range    Previous History Previous Record on File    Prepare RBC, 2 Units   Result Value Ref Range    Product Code L6539H72     Unit Number [id number]     UNIT  ABO O     UNIT  RH POS     Crossmatch Interpretation Compatible     Dispense Status PT     Blood Expiration Date 202112092359     Blood Type Barcode 5100     Product Code L5034M12     Unit Number [id number]     UNIT  ABO O     UNIT  RH POS     Crossmatch Interpretation Compatible     Dispense Status PT     Blood Expiration Date 202112102359     Blood Type Barcode     Green Top (Gel)   Result Value Ref Range    Extra Tube Hold for add-ons.    Lavender Top   Result Value Ref Range    Extra Tube hold for add-on    Gold Top - SST   Result Value Ref Range    Extra Tube  Hold for add-ons.    Light Blue Top   Result Value Ref Range    Extra Tube hold for add-on             Assessment:     Diagnoses and all orders for this visit:    1. Chest pain, unspecified type (Primary)  -     ECG 12 Lead    2. Other iron deficiency anemia  -     CBC w AUTO Differential    3. Visit for screening mammogram  -     Mammo Screening Bilateral With CAD; Future      Patient Active Problem List   Diagnosis   • Type 2 diabetes mellitus with diabetic polyneuropathy, with long-term current use of insulin ([facility])   • Chronic obstructive pulmonary disease ([facility])   • Chronic midline low back pain without sciatica   • Vitamin D deficiency   • Type 2 diabetes mellitus ([facility])   • Tobacco dependence syndrome   • Surgical follow-up care   • Shoulder joint pain   • Peripheral vascular disease ([facility])   • Pain   • Neurologic disorder associated with diabetes mellitus ([facility])   • Morbid obesity with BMI of 50.0-59.9, adult ([facility])   • Mixed hyperlipidemia   • Kidney stone   • History of colon polyps   • GERD (gastroesophageal reflux disease)   • Excoriated eczema   • Essential hypertension   • Encounter for medication refill   • Dyslipidemia   • Diabetes mellitus ([facility])   • Coronary artery disease   • COPD (chronic obstructive pulmonary disease) ([facility])   • Chronic folliculitis   • Coronary artery disease   • Mild persistent asthma   • 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   • Uncontrolled type 2 diabetes mellitus with complication, with long-term current use of insulin ([facility])   • Hypothyroidism   • Carotid artery disease ([facility])   • Current smoker   • DM type 2 with diabetic peripheral neuropathy ([facility])   • [place] abuse   • PAD (peripheral artery disease) ([facility])   • S/P CABG x 3   • Intercostal pain   • Venous insufficiency   • Chronic respiratory failure with hypoxia ([facility])   • LAFB (left anterior fascicular block)   • Pulmonary hypertension ([facility])   • Left hip pain   • Neck pain   • CKD (chronic kidney disease),  symptom management only, stage 5 ([facility])   • MIKE and COPD overlap syndrome ([facility])   • Pneumonia due to COVID-19 virus   • Morbid obesity ([facility])   • Type 2 diabetes mellitus with diabetic nephropathy, with long-term current use of insulin ([facility])   • Hyponatremia   • Hyperkalemia   • Anemia   • Cutaneous candidiasis   • Community acquired pneumonia of right middle lobe of lung   • MRSA infection   • Alkaline phosphatase elevation   • COVID-19 ruled out by laboratory testing   • Esophageal dysphagia   • Monilial esophagitis ([facility])   • NSTEMI, initial episode of care ([facility])   • CAD (coronary artery disease)   • Chronic respiratory failure with hypoxia ([facility])   • Pneumonia due to infectious organism   • Chronic hypercapnic respiratory failure ([facility])   • Cellulitis of left leg   • Chronic diastolic congestive heart failure ([facility])   • Hyponatremia   • Urinary tract infection due to Proteus   • History of insertion of tunneled central venous catheter (CVC) with port   • Anemia due to chronic kidney disease, on chronic dialysis ([facility])   • Anemia   • Type 2 diabetes mellitus with hyperglycemia ([facility])   • Pneumonitis         Plan:   I was present onsite throughout the encounter and saw the patient alongside Dr. Mauricio.  Given her multiple comorbidities and history of noncompliance patient is at very high risk for readmission and indeed has been in the hospital multiple times this year and even spent a brief time at the nursing home culminating in a return visit to the hospital.  Long-term care was denied by the patient's insurance and therefore she is going home we will maintain close follow-up with the patient please see Dr. Mauricio's note for more detailed information regarding plan of care.  Patient will schedule follow-up wellness visit with her PCP within 2 weeks  I have seen and examined the patient.  I have reviewed the notes, assessments, and/or procedures performed by Dr. Mauricio, I concur with her/his documentation and  assessment and plan for Yamileth Slater.            This document has been electronically signed by Blake Burris MD on November 23, 2021 11:37 CST

## 2021-11-23 NOTE — OUTREACH NOTE
Medical Week 2 Survey      Responses   [facility] patient discharged from? [facility]   Does the patient have one of the following disease processes/diagnoses(primary or secondary)? Other   Week 2 attempt successful? No   Unsuccessful attempts Attempt 1   Rescheduled Revoked   Revoke Decline to participate   Revoked No further contact(revokes)-requires comment   Graduated/Revoked comments UTR x4          Dayna Mathew RN

## 2021-11-23 NOTE — CASE COMMUNICATION
Patient's dialysis beginning next week (week of 11/29/21) is Monday, Wed, Friday per her report. Thank you.

## 2021-11-24 LAB
BH CV REST NUCLEAR ISOTOPE DOSE: 29.3 MCI
BH CV STRESS NUCLEAR ISOTOPE DOSE: 35.4 MCI
LV EF NUC BP: 60 %
MAXIMAL PREDICTED HEART RATE: 158 BPM
STRESS TARGET HR: 134 BPM

## 2021-11-26 ENCOUNTER — HOME CARE VISIT (OUTPATIENT)
Dept: HOME HEALTH SERVICES | Facility: HOME HEALTHCARE | Age: 61
End: 2021-11-26

## 2021-11-30 ENCOUNTER — HOME CARE VISIT (OUTPATIENT)
Dept: HOME HEALTH SERVICES | Facility: CLINIC | Age: 61
End: 2021-11-30

## 2021-12-01 DIAGNOSIS — Z79.4 TYPE 2 DIABETES MELLITUS WITH DIABETIC NEPHROPATHY, WITH LONG-TERM CURRENT USE OF INSULIN (HCC): ICD-10-CM

## 2021-12-01 DIAGNOSIS — Z79.4 TYPE 2 DIABETES MELLITUS WITH DIABETIC POLYNEUROPATHY, WITH LONG-TERM CURRENT USE OF INSULIN (HCC): Primary | ICD-10-CM

## 2021-12-01 DIAGNOSIS — IMO0002 UNCONTROLLED TYPE 2 DIABETES MELLITUS WITH DIABETIC POLYNEUROPATHY, WITH LONG-TERM CURRENT USE OF INSULIN: Primary | ICD-10-CM

## 2021-12-01 DIAGNOSIS — E11.21 TYPE 2 DIABETES MELLITUS WITH DIABETIC NEPHROPATHY, WITH LONG-TERM CURRENT USE OF INSULIN (HCC): ICD-10-CM

## 2021-12-01 DIAGNOSIS — E11.42 TYPE 2 DIABETES MELLITUS WITH DIABETIC POLYNEUROPATHY, WITH LONG-TERM CURRENT USE OF INSULIN (HCC): Primary | ICD-10-CM

## 2021-12-01 RX ORDER — GABAPENTIN 800 MG/1
800 TABLET ORAL 3 TIMES DAILY
Qty: 90 TABLET | Refills: 0 | Status: SHIPPED | OUTPATIENT
Start: 2021-12-01 | End: 2021-12-01

## 2021-12-01 RX ORDER — GABAPENTIN 400 MG/1
400 CAPSULE ORAL 3 TIMES DAILY
Qty: 90 CAPSULE | Refills: 0 | Status: CANCELLED | OUTPATIENT
Start: 2021-12-01

## 2021-12-01 RX ORDER — GABAPENTIN 800 MG/1
800 TABLET ORAL 3 TIMES DAILY
Qty: 90 TABLET | Refills: 0 | Status: CANCELLED | OUTPATIENT
Start: 2021-12-01

## 2021-12-01 RX ORDER — GABAPENTIN 800 MG/1
800 TABLET ORAL 3 TIMES DAILY
Qty: 90 TABLET | Refills: 0 | Status: SHIPPED | OUTPATIENT
Start: 2021-12-01 | End: 2022-01-29 | Stop reason: HOSPADM

## 2021-12-01 NOTE — TELEPHONE ENCOUNTER
Incoming Refill Request      Medication requested (name and dose):   gabapentin (NEURONTIN) 800 MG tablet  Pharmacy where request should be sent:   [facility]  Additional details provided by patient: LIPS AND MOUTH SWOLLEN. COULD THIS BE FROM DIALYSIS    Best call back number: [phone]    Does the patient have less than a 3 day supply:  [x] Yes  [] No    Ferdinand Ramos Rep  12/01/21, 14:02 CST

## 2021-12-02 ENCOUNTER — HOME CARE VISIT (OUTPATIENT)
Dept: HOME HEALTH SERVICES | Facility: CLINIC | Age: 61
End: 2021-12-02

## 2021-12-02 VITALS
SYSTOLIC BLOOD PRESSURE: 110 MMHG | DIASTOLIC BLOOD PRESSURE: 70 MMHG | OXYGEN SATURATION: 98 % | HEART RATE: 58 BPM | RESPIRATION RATE: 20 BRPM | TEMPERATURE: 96 F

## 2021-12-02 PROCEDURE — G0158 HHC OT ASSISTANT EA 15: HCPCS

## 2021-12-02 NOTE — CASE COMMUNICATION
Patient will be D/C from  OT next visit on 12/8/21   No issues currently, expected all goals will be met.  Also a reminder to put in for PT visits after this date    Huy PASTOR  12/2/21

## 2021-12-09 ENCOUNTER — HOME CARE VISIT (OUTPATIENT)
Dept: HOME HEALTH SERVICES | Facility: CLINIC | Age: 61
End: 2021-12-09

## 2021-12-09 VITALS
HEART RATE: 68 BPM | RESPIRATION RATE: 20 BRPM | OXYGEN SATURATION: 98 % | SYSTOLIC BLOOD PRESSURE: 102 MMHG | DIASTOLIC BLOOD PRESSURE: 50 MMHG | TEMPERATURE: 97.1 F

## 2021-12-09 PROCEDURE — G0180 MD CERTIFICATION HHA PATIENT: HCPCS | Performed by: HOSPITALIST

## 2021-12-09 PROCEDURE — G0158 HHC OT ASSISTANT EA 15: HCPCS

## 2021-12-10 RX ORDER — BUMETANIDE 1 MG/1
1 TABLET ORAL 2 TIMES DAILY
Qty: 60 TABLET | Refills: 3 | Status: SHIPPED | OUTPATIENT
Start: 2021-12-10 | End: 2021-12-15 | Stop reason: HOSPADM

## 2021-12-10 RX ORDER — METOPROLOL TARTRATE 50 MG/1
TABLET, FILM COATED ORAL
Qty: 60 TABLET | Refills: 1 | Status: SHIPPED | OUTPATIENT
Start: 2021-12-10 | End: 2022-01-20 | Stop reason: HOSPADM

## 2021-12-10 RX ORDER — ATORVASTATIN CALCIUM 20 MG/1
TABLET, FILM COATED ORAL
Qty: 30 TABLET | Refills: 1 | Status: SHIPPED | OUTPATIENT
Start: 2021-12-10 | End: 2022-02-07

## 2021-12-10 NOTE — CASE COMMUNICATION
OCCUPATIONAL THERAPY DISCHARGE  PATIENT MET ALL GOALS  PRIOR VS CURRENT LEVEL   At Eval  Ambulation: contact guard assist   Bathing: moderate assist   Toileting: contact guard assist, minimum assist   Upper body dressing: contact guard assist   Lower body dressing: minimum assist, moderate assist   Feeding: independent   Grooming: contact guard assist   Physical transfers: contact guard assist, minimum assist      At D/C - BUE strength/ endurance progressing with HEP.  Patient completes ADLs with assist from family as needed.  Family completes cooking and homemaking.  DISCHARGE STATUS     TO SELF    DISCHARGE CONDITION   GOOD     DISCHARGE REASON: MET ALL GOALS  MD VISIT:  unknown  INSTRUCTIONS HOME PROGRAM PROVIDED TO:  patient  CONTENT -  BUE therex with red Tband various planes 2x10-20, twice daily with R/Bs PRN  PATIENT CAREGIVER LEVEL OF COMPLIANCE:  good  UNMET N EEDS   N/A  SERVICES CONTINUING   PT  COMMUNICATION / CARE COORDINATION:  none  HHACN/MEDICARE 2 DAY NOTICE GIVEN:  no

## 2021-12-12 ENCOUNTER — APPOINTMENT (OUTPATIENT)
Dept: GENERAL RADIOLOGY | Facility: HOSPITAL | Age: 61
End: 2021-12-12

## 2021-12-12 ENCOUNTER — HOSPITAL ENCOUNTER (INPATIENT)
Facility: HOSPITAL | Age: 61
LOS: 3 days | Discharge: HOME HEALTH | End: 2021-12-15
Attending: EMERGENCY MEDICINE | Admitting: HOSPITALIST

## 2021-12-12 DIAGNOSIS — I50.9 ACUTE ON CHRONIC CONGESTIVE HEART FAILURE, UNSPECIFIED HEART FAILURE TYPE (HCC): ICD-10-CM

## 2021-12-12 DIAGNOSIS — N18.6 END STAGE RENAL DISEASE (HCC): ICD-10-CM

## 2021-12-12 DIAGNOSIS — R06.00 DYSPNEA, UNSPECIFIED TYPE: Primary | ICD-10-CM

## 2021-12-12 DIAGNOSIS — J41.1 MUCOPURULENT CHRONIC BRONCHITIS (HCC): ICD-10-CM

## 2021-12-12 DIAGNOSIS — R77.8 ELEVATED TROPONIN: ICD-10-CM

## 2021-12-12 DIAGNOSIS — D64.9 ANEMIA, UNSPECIFIED TYPE: ICD-10-CM

## 2021-12-12 DIAGNOSIS — J18.9 PNEUMONIA OF RIGHT LUNG DUE TO INFECTIOUS ORGANISM, UNSPECIFIED PART OF LUNG: ICD-10-CM

## 2021-12-12 DIAGNOSIS — R73.9 HYPERGLYCEMIA: ICD-10-CM

## 2021-12-12 DIAGNOSIS — N18.5 CKD (CHRONIC KIDNEY DISEASE), SYMPTOM MANAGEMENT ONLY, STAGE 5 (HCC): ICD-10-CM

## 2021-12-12 PROBLEM — Z91.199 PERSONAL HISTORY OF NONCOMPLIANCE WITH MEDICAL TREATMENT, PRESENTING HAZARDS TO HEALTH: Status: ACTIVE | Noted: 2021-12-12

## 2021-12-12 LAB
ALBUMIN SERPL-MCNC: 3.7 G/DL (ref 3.5–5.2)
ALBUMIN/GLOB SERPL: 0.9 G/DL
ALP SERPL-CCNC: 215 U/L (ref 39–117)
ALT SERPL W P-5'-P-CCNC: 10 U/L (ref 1–33)
ANION GAP SERPL CALCULATED.3IONS-SCNC: 10 MMOL/L (ref 5–15)
ANISOCYTOSIS BLD QL: ABNORMAL
AST SERPL-CCNC: 14 U/L (ref 1–32)
BASOPHILS # BLD MANUAL: 0.26 10*3/MM3 (ref 0–0.2)
BASOPHILS NFR BLD MANUAL: 3 % (ref 0–1.5)
BILIRUB SERPL-MCNC: 0.2 MG/DL (ref 0–1.2)
BUN SERPL-MCNC: 50 MG/DL (ref 8–23)
BUN/CREAT SERPL: 15.5 (ref 7–25)
CALCIUM SPEC-SCNC: 8.7 MG/DL (ref 8.6–10.5)
CHLORIDE SERPL-SCNC: 92 MMOL/L (ref 98–107)
CO2 SERPL-SCNC: 26 MMOL/L (ref 22–29)
CREAT SERPL-MCNC: 3.23 MG/DL (ref 0.57–1)
DEPRECATED RDW RBC AUTO: 55.8 FL (ref 37–54)
EOSINOPHIL # BLD MANUAL: 0.87 10*3/MM3 (ref 0–0.4)
EOSINOPHIL NFR BLD MANUAL: 10 % (ref 0.3–6.2)
ERYTHROCYTE [DISTWIDTH] IN BLOOD BY AUTOMATED COUNT: 17.6 % (ref 12.3–15.4)
FLUAV RNA RESP QL NAA+PROBE: NOT DETECTED
FLUBV RNA RESP QL NAA+PROBE: NOT DETECTED
GFR SERPL CREATININE-BSD FRML MDRD: 15 ML/MIN/1.73
GLOBULIN UR ELPH-MCNC: 4 GM/DL
GLUCOSE SERPL-MCNC: 502 MG/DL (ref 65–99)
HCT VFR BLD AUTO: 24.4 % (ref 34–46.6)
HGB BLD-MCNC: 7.7 G/DL (ref 12–15.9)
HOLD SPECIMEN: NORMAL
LYMPHOCYTES # BLD MANUAL: 1.56 10*3/MM3 (ref 0.7–3.1)
LYMPHOCYTES NFR BLD MANUAL: 2 % (ref 5–12)
MCH RBC QN AUTO: 28.2 PG (ref 26.6–33)
MCHC RBC AUTO-ENTMCNC: 31.6 G/DL (ref 31.5–35.7)
MCV RBC AUTO: 89.4 FL (ref 79–97)
MONOCYTES # BLD: 0.17 10*3/MM3 (ref 0.1–0.9)
NEUTROPHILS # BLD AUTO: 5.82 10*3/MM3 (ref 1.7–7)
NEUTROPHILS NFR BLD MANUAL: 67 % (ref 42.7–76)
NT-PROBNP SERPL-MCNC: 4156 PG/ML (ref 0–900)
PLATELET # BLD AUTO: 240 10*3/MM3 (ref 140–450)
PMV BLD AUTO: 9.1 FL (ref 6–12)
POLYCHROMASIA BLD QL SMEAR: ABNORMAL
POTASSIUM SERPL-SCNC: 4.1 MMOL/L (ref 3.5–5.2)
PROT SERPL-MCNC: 7.7 G/DL (ref 6–8.5)
RBC # BLD AUTO: 2.73 10*6/MM3 (ref 3.77–5.28)
SARS-COV-2 RNA RESP QL NAA+PROBE: NOT DETECTED
SMALL PLATELETS BLD QL SMEAR: ADEQUATE
SODIUM SERPL-SCNC: 128 MMOL/L (ref 136–145)
TROPONIN T SERPL-MCNC: 0.03 NG/ML (ref 0–0.03)
VARIANT LYMPHS NFR BLD MANUAL: 18 % (ref 19.6–45.3)
WBC MORPH BLD: NORMAL
WBC NRBC COR # BLD: 8.69 10*3/MM3 (ref 3.4–10.8)

## 2021-12-12 PROCEDURE — 87636 SARSCOV2 & INF A&B AMP PRB: CPT | Performed by: EMERGENCY MEDICINE

## 2021-12-12 PROCEDURE — 85007 BL SMEAR W/DIFF WBC COUNT: CPT | Performed by: EMERGENCY MEDICINE

## 2021-12-12 PROCEDURE — 63710000001 INSULIN DETEMIR PER 5 UNITS: Performed by: STUDENT IN AN ORGANIZED HEALTH CARE EDUCATION/TRAINING PROGRAM

## 2021-12-12 PROCEDURE — G0378 HOSPITAL OBSERVATION PER HR: HCPCS

## 2021-12-12 PROCEDURE — 25010000002 HEPARIN (PORCINE) PER 1000 UNITS: Performed by: STUDENT IN AN ORGANIZED HEALTH CARE EDUCATION/TRAINING PROGRAM

## 2021-12-12 PROCEDURE — 99222 1ST HOSP IP/OBS MODERATE 55: CPT | Performed by: STUDENT IN AN ORGANIZED HEALTH CARE EDUCATION/TRAINING PROGRAM

## 2021-12-12 PROCEDURE — 82962 GLUCOSE BLOOD TEST: CPT

## 2021-12-12 PROCEDURE — 25010000002 AZITHROMYCIN PER 500 MG: Performed by: EMERGENCY MEDICINE

## 2021-12-12 PROCEDURE — 71045 X-RAY EXAM CHEST 1 VIEW: CPT

## 2021-12-12 PROCEDURE — 63710000001 INSULIN REGULAR HUMAN PER 5 UNITS: Performed by: EMERGENCY MEDICINE

## 2021-12-12 PROCEDURE — 99284 EMERGENCY DEPT VISIT MOD MDM: CPT

## 2021-12-12 PROCEDURE — 85025 COMPLETE CBC W/AUTO DIFF WBC: CPT | Performed by: EMERGENCY MEDICINE

## 2021-12-12 PROCEDURE — 93005 ELECTROCARDIOGRAM TRACING: CPT | Performed by: EMERGENCY MEDICINE

## 2021-12-12 PROCEDURE — 94640 AIRWAY INHALATION TREATMENT: CPT

## 2021-12-12 PROCEDURE — 84484 ASSAY OF TROPONIN QUANT: CPT | Performed by: EMERGENCY MEDICINE

## 2021-12-12 PROCEDURE — 83880 ASSAY OF NATRIURETIC PEPTIDE: CPT | Performed by: EMERGENCY MEDICINE

## 2021-12-12 PROCEDURE — 93010 ELECTROCARDIOGRAM REPORT: CPT | Performed by: INTERNAL MEDICINE

## 2021-12-12 PROCEDURE — 87040 BLOOD CULTURE FOR BACTERIA: CPT | Performed by: EMERGENCY MEDICINE

## 2021-12-12 PROCEDURE — 25010000002 CEFTRIAXONE PER 250 MG: Performed by: EMERGENCY MEDICINE

## 2021-12-12 PROCEDURE — 80053 COMPREHEN METABOLIC PANEL: CPT | Performed by: EMERGENCY MEDICINE

## 2021-12-12 PROCEDURE — 25010000002 METHYLPREDNISOLONE PER 125 MG: Performed by: EMERGENCY MEDICINE

## 2021-12-12 RX ORDER — ATORVASTATIN CALCIUM 20 MG/1
20 TABLET, FILM COATED ORAL DAILY
Status: DISCONTINUED | OUTPATIENT
Start: 2021-12-13 | End: 2021-12-15 | Stop reason: HOSPADM

## 2021-12-12 RX ORDER — ASPIRIN 81 MG/1
81 TABLET ORAL DAILY
Status: DISCONTINUED | OUTPATIENT
Start: 2021-12-13 | End: 2021-12-15 | Stop reason: HOSPADM

## 2021-12-12 RX ORDER — ALBUTEROL SULFATE 2.5 MG/3ML
2.5 SOLUTION RESPIRATORY (INHALATION) EVERY 4 HOURS PRN
Status: DISCONTINUED | OUTPATIENT
Start: 2021-12-12 | End: 2021-12-15 | Stop reason: HOSPADM

## 2021-12-12 RX ORDER — DULOXETIN HYDROCHLORIDE 20 MG/1
20 CAPSULE, DELAYED RELEASE ORAL DAILY
Status: DISCONTINUED | OUTPATIENT
Start: 2021-12-13 | End: 2021-12-15 | Stop reason: HOSPADM

## 2021-12-12 RX ORDER — SODIUM CHLORIDE 0.9 % (FLUSH) 0.9 %
10 SYRINGE (ML) INJECTION AS NEEDED
Status: DISCONTINUED | OUTPATIENT
Start: 2021-12-12 | End: 2021-12-15 | Stop reason: HOSPADM

## 2021-12-12 RX ORDER — GABAPENTIN 400 MG/1
800 CAPSULE ORAL EVERY 8 HOURS SCHEDULED
Status: DISCONTINUED | OUTPATIENT
Start: 2021-12-12 | End: 2021-12-15

## 2021-12-12 RX ORDER — LISINOPRIL 5 MG/1
5 TABLET ORAL DAILY
Status: DISCONTINUED | OUTPATIENT
Start: 2021-12-13 | End: 2021-12-15 | Stop reason: HOSPADM

## 2021-12-12 RX ORDER — METOPROLOL TARTRATE 50 MG/1
50 TABLET, FILM COATED ORAL EVERY 12 HOURS SCHEDULED
Status: DISCONTINUED | OUTPATIENT
Start: 2021-12-12 | End: 2021-12-15 | Stop reason: HOSPADM

## 2021-12-12 RX ORDER — NICOTINE POLACRILEX 4 MG
15 LOZENGE BUCCAL
Status: DISCONTINUED | OUTPATIENT
Start: 2021-12-12 | End: 2021-12-14 | Stop reason: SDUPTHER

## 2021-12-12 RX ORDER — IPRATROPIUM BROMIDE AND ALBUTEROL SULFATE 2.5; .5 MG/3ML; MG/3ML
3 SOLUTION RESPIRATORY (INHALATION) ONCE
Status: COMPLETED | OUTPATIENT
Start: 2021-12-12 | End: 2021-12-12

## 2021-12-12 RX ORDER — NYSTATIN 100000 [USP'U]/G
POWDER TOPICAL 3 TIMES DAILY
Status: DISCONTINUED | OUTPATIENT
Start: 2021-12-12 | End: 2021-12-15 | Stop reason: HOSPADM

## 2021-12-12 RX ORDER — IPRATROPIUM BROMIDE AND ALBUTEROL SULFATE 2.5; .5 MG/3ML; MG/3ML
3 SOLUTION RESPIRATORY (INHALATION)
Status: DISCONTINUED | OUTPATIENT
Start: 2021-12-12 | End: 2021-12-15 | Stop reason: HOSPADM

## 2021-12-12 RX ORDER — FERROUS SULFATE TAB EC 324 MG (65 MG FE EQUIVALENT) 324 (65 FE) MG
324 TABLET DELAYED RESPONSE ORAL
Status: DISCONTINUED | OUTPATIENT
Start: 2021-12-13 | End: 2021-12-15 | Stop reason: HOSPADM

## 2021-12-12 RX ORDER — LEVOTHYROXINE SODIUM 0.03 MG/1
25 TABLET ORAL
Status: DISCONTINUED | OUTPATIENT
Start: 2021-12-13 | End: 2021-12-15 | Stop reason: HOSPADM

## 2021-12-12 RX ORDER — BUMETANIDE 1 MG/1
1 TABLET ORAL 2 TIMES DAILY
Status: DISCONTINUED | OUTPATIENT
Start: 2021-12-12 | End: 2021-12-14

## 2021-12-12 RX ORDER — OXYBUTYNIN CHLORIDE 5 MG/1
5 TABLET, EXTENDED RELEASE ORAL DAILY
Status: DISCONTINUED | OUTPATIENT
Start: 2021-12-13 | End: 2021-12-15 | Stop reason: HOSPADM

## 2021-12-12 RX ORDER — MONTELUKAST SODIUM 10 MG/1
10 TABLET ORAL NIGHTLY
Status: DISCONTINUED | OUTPATIENT
Start: 2021-12-12 | End: 2021-12-15 | Stop reason: HOSPADM

## 2021-12-12 RX ORDER — SODIUM CHLORIDE 0.9 % (FLUSH) 0.9 %
10 SYRINGE (ML) INJECTION EVERY 12 HOURS SCHEDULED
Status: DISCONTINUED | OUTPATIENT
Start: 2021-12-12 | End: 2021-12-15 | Stop reason: HOSPADM

## 2021-12-12 RX ORDER — METHYLPREDNISOLONE SODIUM SUCCINATE 125 MG/2ML
125 INJECTION, POWDER, LYOPHILIZED, FOR SOLUTION INTRAMUSCULAR; INTRAVENOUS ONCE
Status: COMPLETED | OUTPATIENT
Start: 2021-12-12 | End: 2021-12-12

## 2021-12-12 RX ORDER — ONDANSETRON 4 MG/1
4 TABLET, ORALLY DISINTEGRATING ORAL EVERY 8 HOURS PRN
Status: DISCONTINUED | OUTPATIENT
Start: 2021-12-12 | End: 2021-12-15 | Stop reason: HOSPADM

## 2021-12-12 RX ORDER — CETIRIZINE HYDROCHLORIDE 10 MG/1
10 TABLET ORAL DAILY
Status: DISCONTINUED | OUTPATIENT
Start: 2021-12-13 | End: 2021-12-15 | Stop reason: HOSPADM

## 2021-12-12 RX ORDER — ALBUTEROL SULFATE 90 UG/1
2 AEROSOL, METERED RESPIRATORY (INHALATION) EVERY 4 HOURS PRN
Status: DISCONTINUED | OUTPATIENT
Start: 2021-12-12 | End: 2021-12-12 | Stop reason: SDUPTHER

## 2021-12-12 RX ORDER — CYCLOBENZAPRINE HCL 10 MG
10 TABLET ORAL 2 TIMES DAILY PRN
Status: DISCONTINUED | OUTPATIENT
Start: 2021-12-12 | End: 2021-12-15 | Stop reason: HOSPADM

## 2021-12-12 RX ORDER — RANOLAZINE 500 MG/1
500 TABLET, EXTENDED RELEASE ORAL EVERY 12 HOURS SCHEDULED
Status: DISCONTINUED | OUTPATIENT
Start: 2021-12-12 | End: 2021-12-15 | Stop reason: HOSPADM

## 2021-12-12 RX ORDER — DEXTROSE MONOHYDRATE 25 G/50ML
25 INJECTION, SOLUTION INTRAVENOUS
Status: DISCONTINUED | OUTPATIENT
Start: 2021-12-12 | End: 2021-12-14 | Stop reason: SDUPTHER

## 2021-12-12 RX ORDER — HYDRALAZINE HYDROCHLORIDE 10 MG/1
10 TABLET, FILM COATED ORAL EVERY 6 HOURS PRN
Status: DISCONTINUED | OUTPATIENT
Start: 2021-12-12 | End: 2021-12-15 | Stop reason: HOSPADM

## 2021-12-12 RX ORDER — CLOPIDOGREL BISULFATE 75 MG/1
75 TABLET ORAL DAILY
Status: DISCONTINUED | OUTPATIENT
Start: 2021-12-13 | End: 2021-12-15 | Stop reason: HOSPADM

## 2021-12-12 RX ORDER — PANTOPRAZOLE SODIUM 40 MG/1
40 TABLET, DELAYED RELEASE ORAL NIGHTLY
Status: DISCONTINUED | OUTPATIENT
Start: 2021-12-12 | End: 2021-12-15 | Stop reason: HOSPADM

## 2021-12-12 RX ORDER — ISOSORBIDE MONONITRATE 30 MG/1
30 TABLET, EXTENDED RELEASE ORAL EVERY MORNING
Status: DISCONTINUED | OUTPATIENT
Start: 2021-12-13 | End: 2021-12-15 | Stop reason: HOSPADM

## 2021-12-12 RX ORDER — HEPARIN SODIUM 5000 [USP'U]/ML
5000 INJECTION, SOLUTION INTRAVENOUS; SUBCUTANEOUS EVERY 8 HOURS SCHEDULED
Status: DISCONTINUED | OUTPATIENT
Start: 2021-12-12 | End: 2021-12-15 | Stop reason: HOSPADM

## 2021-12-12 RX ORDER — AMOXICILLIN 250 MG
2 CAPSULE ORAL 2 TIMES DAILY
Status: DISCONTINUED | OUTPATIENT
Start: 2021-12-12 | End: 2021-12-15 | Stop reason: HOSPADM

## 2021-12-12 RX ADMIN — METOPROLOL TARTRATE 50 MG: 50 TABLET, FILM COATED ORAL at 23:53

## 2021-12-12 RX ADMIN — HUMAN INSULIN 5 UNITS: 100 INJECTION, SOLUTION SUBCUTANEOUS at 22:15

## 2021-12-12 RX ADMIN — PANTOPRAZOLE SODIUM 40 MG: 40 TABLET, DELAYED RELEASE ORAL at 23:53

## 2021-12-12 RX ADMIN — INSULIN DETEMIR 30 UNITS: 100 INJECTION, SOLUTION SUBCUTANEOUS at 23:54

## 2021-12-12 RX ADMIN — IPRATROPIUM BROMIDE AND ALBUTEROL SULFATE 3 ML: 2.5; .5 SOLUTION RESPIRATORY (INHALATION) at 20:12

## 2021-12-12 RX ADMIN — METHYLPREDNISOLONE SODIUM SUCCINATE 125 MG: 125 INJECTION, POWDER, FOR SOLUTION INTRAMUSCULAR; INTRAVENOUS at 20:44

## 2021-12-12 RX ADMIN — CEFTRIAXONE SODIUM 1 G: 1 INJECTION, POWDER, FOR SOLUTION INTRAMUSCULAR; INTRAVENOUS at 22:12

## 2021-12-12 RX ADMIN — BUMETANIDE 1 MG: 1 TABLET ORAL at 23:52

## 2021-12-12 RX ADMIN — NYSTATIN: 100000 POWDER TOPICAL at 23:51

## 2021-12-12 RX ADMIN — HEPARIN SODIUM 5000 UNITS: 5000 INJECTION INTRAVENOUS; SUBCUTANEOUS at 23:51

## 2021-12-12 RX ADMIN — DOCUSATE SODIUM 50 MG AND SENNOSIDES 8.6 MG 2 TABLET: 8.6; 5 TABLET, FILM COATED ORAL at 23:52

## 2021-12-12 RX ADMIN — GABAPENTIN 800 MG: 400 CAPSULE ORAL at 23:53

## 2021-12-12 RX ADMIN — MONTELUKAST 10 MG: 10 TABLET, FILM COATED ORAL at 23:53

## 2021-12-12 RX ADMIN — RANOLAZINE 500 MG: 500 TABLET, FILM COATED, EXTENDED RELEASE ORAL at 23:52

## 2021-12-13 ENCOUNTER — HOME CARE VISIT (OUTPATIENT)
Dept: HOME HEALTH SERVICES | Facility: HOME HEALTHCARE | Age: 61
End: 2021-12-13

## 2021-12-13 ENCOUNTER — HOME CARE VISIT (OUTPATIENT)
Dept: HOME HEALTH SERVICES | Facility: CLINIC | Age: 61
End: 2021-12-13

## 2021-12-13 PROBLEM — R06.00 DYSPNEA: Status: ACTIVE | Noted: 2021-12-13

## 2021-12-13 LAB
ABO GROUP BLD: NORMAL
ALBUMIN SERPL-MCNC: 3.6 G/DL (ref 3.5–5.2)
ALBUMIN/GLOB SERPL: 0.9 G/DL
ALP SERPL-CCNC: 231 U/L (ref 39–117)
ALT SERPL W P-5'-P-CCNC: 11 U/L (ref 1–33)
ANION GAP SERPL CALCULATED.3IONS-SCNC: 12 MMOL/L (ref 5–15)
ANION GAP SERPL CALCULATED.3IONS-SCNC: 14 MMOL/L (ref 5–15)
ANION GAP SERPL CALCULATED.3IONS-SCNC: 15 MMOL/L (ref 5–15)
AST SERPL-CCNC: 13 U/L (ref 1–32)
BASOPHILS # BLD AUTO: 0.05 10*3/MM3 (ref 0–0.2)
BASOPHILS NFR BLD AUTO: 0.6 % (ref 0–1.5)
BILIRUB SERPL-MCNC: 0.2 MG/DL (ref 0–1.2)
BLD GP AB SCN SERPL QL: NEGATIVE
BUN SERPL-MCNC: 35 MG/DL (ref 8–23)
BUN SERPL-MCNC: 51 MG/DL (ref 8–23)
BUN SERPL-MCNC: 57 MG/DL (ref 8–23)
BUN/CREAT SERPL: 12.8 (ref 7–25)
BUN/CREAT SERPL: 15.8 (ref 7–25)
BUN/CREAT SERPL: 16.9 (ref 7–25)
CALCIUM SPEC-SCNC: 8.5 MG/DL (ref 8.6–10.5)
CALCIUM SPEC-SCNC: 8.6 MG/DL (ref 8.6–10.5)
CALCIUM SPEC-SCNC: 8.6 MG/DL (ref 8.6–10.5)
CHLORIDE SERPL-SCNC: 89 MMOL/L (ref 98–107)
CHLORIDE SERPL-SCNC: 90 MMOL/L (ref 98–107)
CHLORIDE SERPL-SCNC: 94 MMOL/L (ref 98–107)
CO2 SERPL-SCNC: 20 MMOL/L (ref 22–29)
CO2 SERPL-SCNC: 22 MMOL/L (ref 22–29)
CO2 SERPL-SCNC: 22 MMOL/L (ref 22–29)
CREAT SERPL-MCNC: 2.73 MG/DL (ref 0.57–1)
CREAT SERPL-MCNC: 3.23 MG/DL (ref 0.57–1)
CREAT SERPL-MCNC: 3.38 MG/DL (ref 0.57–1)
DEPRECATED RDW RBC AUTO: 54.5 FL (ref 37–54)
EOSINOPHIL # BLD AUTO: 0.04 10*3/MM3 (ref 0–0.4)
EOSINOPHIL NFR BLD AUTO: 0.5 % (ref 0.3–6.2)
ERYTHROCYTE [DISTWIDTH] IN BLOOD BY AUTOMATED COUNT: 17.2 % (ref 12.3–15.4)
GFR SERPL CREATININE-BSD FRML MDRD: 14 ML/MIN/1.73
GFR SERPL CREATININE-BSD FRML MDRD: 15 ML/MIN/1.73
GFR SERPL CREATININE-BSD FRML MDRD: 18 ML/MIN/1.73
GFR SERPL CREATININE-BSD FRML MDRD: ABNORMAL ML/MIN/{1.73_M2}
GLOBULIN UR ELPH-MCNC: 4 GM/DL
GLUCOSE BLDC GLUCOMTR-MCNC: 227 MG/DL (ref 70–130)
GLUCOSE BLDC GLUCOMTR-MCNC: 244 MG/DL (ref 70–130)
GLUCOSE BLDC GLUCOMTR-MCNC: 277 MG/DL (ref 70–130)
GLUCOSE BLDC GLUCOMTR-MCNC: 287 MG/DL (ref 70–130)
GLUCOSE BLDC GLUCOMTR-MCNC: 324 MG/DL (ref 70–130)
GLUCOSE BLDC GLUCOMTR-MCNC: 470 MG/DL (ref 70–130)
GLUCOSE BLDC GLUCOMTR-MCNC: 518 MG/DL (ref 70–130)
GLUCOSE BLDC GLUCOMTR-MCNC: 521 MG/DL (ref 70–130)
GLUCOSE BLDC GLUCOMTR-MCNC: 530 MG/DL (ref 70–130)
GLUCOSE BLDC GLUCOMTR-MCNC: 552 MG/DL (ref 70–130)
GLUCOSE BLDC GLUCOMTR-MCNC: 572 MG/DL (ref 70–130)
GLUCOSE SERPL-MCNC: 241 MG/DL (ref 65–99)
GLUCOSE SERPL-MCNC: 516 MG/DL (ref 65–99)
GLUCOSE SERPL-MCNC: 565 MG/DL (ref 65–99)
HBV SURFACE AG SERPL QL IA: NORMAL
HCT VFR BLD AUTO: 23.3 % (ref 34–46.6)
HGB BLD-MCNC: 7.4 G/DL (ref 12–15.9)
HOLD SPECIMEN: NORMAL
IMM GRANULOCYTES # BLD AUTO: 0.12 10*3/MM3 (ref 0–0.05)
IMM GRANULOCYTES NFR BLD AUTO: 1.4 % (ref 0–0.5)
LYMPHOCYTES # BLD AUTO: 0.95 10*3/MM3 (ref 0.7–3.1)
LYMPHOCYTES NFR BLD AUTO: 10.7 % (ref 19.6–45.3)
Lab: NORMAL
MCH RBC QN AUTO: 28.2 PG (ref 26.6–33)
MCHC RBC AUTO-ENTMCNC: 31.8 G/DL (ref 31.5–35.7)
MCV RBC AUTO: 88.9 FL (ref 79–97)
MONOCYTES # BLD AUTO: 0.13 10*3/MM3 (ref 0.1–0.9)
MONOCYTES NFR BLD AUTO: 1.5 % (ref 5–12)
NEUTROPHILS NFR BLD AUTO: 7.59 10*3/MM3 (ref 1.7–7)
NEUTROPHILS NFR BLD AUTO: 85.3 % (ref 42.7–76)
NRBC BLD AUTO-RTO: 0 /100 WBC (ref 0–0.2)
PLATELET # BLD AUTO: 192 10*3/MM3 (ref 140–450)
PMV BLD AUTO: 8.8 FL (ref 6–12)
POTASSIUM SERPL-SCNC: 3.8 MMOL/L (ref 3.5–5.2)
POTASSIUM SERPL-SCNC: 4.9 MMOL/L (ref 3.5–5.2)
POTASSIUM SERPL-SCNC: 5.2 MMOL/L (ref 3.5–5.2)
PROT SERPL-MCNC: 7.6 G/DL (ref 6–8.5)
QT INTERVAL: 508 MS
QTC INTERVAL: 519 MS
RBC # BLD AUTO: 2.62 10*6/MM3 (ref 3.77–5.28)
RH BLD: POSITIVE
SODIUM SERPL-SCNC: 124 MMOL/L (ref 136–145)
SODIUM SERPL-SCNC: 126 MMOL/L (ref 136–145)
SODIUM SERPL-SCNC: 128 MMOL/L (ref 136–145)
T&S EXPIRATION DATE: NORMAL
TROPONIN T SERPL-MCNC: 0.02 NG/ML (ref 0–0.03)
TROPONIN T SERPL-MCNC: 0.03 NG/ML (ref 0–0.03)
WBC NRBC COR # BLD: 8.88 10*3/MM3 (ref 3.4–10.8)
WHOLE BLOOD HOLD SPECIMEN: NORMAL

## 2021-12-13 PROCEDURE — 84484 ASSAY OF TROPONIN QUANT: CPT | Performed by: STUDENT IN AN ORGANIZED HEALTH CARE EDUCATION/TRAINING PROGRAM

## 2021-12-13 PROCEDURE — 99231 SBSQ HOSP IP/OBS SF/LOW 25: CPT | Performed by: STUDENT IN AN ORGANIZED HEALTH CARE EDUCATION/TRAINING PROGRAM

## 2021-12-13 PROCEDURE — 87340 HEPATITIS B SURFACE AG IA: CPT | Performed by: INTERNAL MEDICINE

## 2021-12-13 PROCEDURE — 86901 BLOOD TYPING SEROLOGIC RH(D): CPT | Performed by: STUDENT IN AN ORGANIZED HEALTH CARE EDUCATION/TRAINING PROGRAM

## 2021-12-13 PROCEDURE — 94799 UNLISTED PULMONARY SVC/PX: CPT

## 2021-12-13 PROCEDURE — 0 INSULIN REGULAR HUMAN PER 5 UNITS: Performed by: STUDENT IN AN ORGANIZED HEALTH CARE EDUCATION/TRAINING PROGRAM

## 2021-12-13 PROCEDURE — 36415 COLL VENOUS BLD VENIPUNCTURE: CPT | Performed by: STUDENT IN AN ORGANIZED HEALTH CARE EDUCATION/TRAINING PROGRAM

## 2021-12-13 PROCEDURE — 82962 GLUCOSE BLOOD TEST: CPT

## 2021-12-13 PROCEDURE — 5A1D70Z PERFORMANCE OF URINARY FILTRATION, INTERMITTENT, LESS THAN 6 HOURS PER DAY: ICD-10-PCS | Performed by: INTERNAL MEDICINE

## 2021-12-13 PROCEDURE — 63710000001 INSULIN REGULAR HUMAN PER 5 UNITS: Performed by: STUDENT IN AN ORGANIZED HEALTH CARE EDUCATION/TRAINING PROGRAM

## 2021-12-13 PROCEDURE — 25010000002 HEPARIN (PORCINE) PER 1000 UNITS: Performed by: STUDENT IN AN ORGANIZED HEALTH CARE EDUCATION/TRAINING PROGRAM

## 2021-12-13 PROCEDURE — 85025 COMPLETE CBC W/AUTO DIFF WBC: CPT | Performed by: STUDENT IN AN ORGANIZED HEALTH CARE EDUCATION/TRAINING PROGRAM

## 2021-12-13 PROCEDURE — 94760 N-INVAS EAR/PLS OXIMETRY 1: CPT

## 2021-12-13 PROCEDURE — 86900 BLOOD TYPING SEROLOGIC ABO: CPT | Performed by: STUDENT IN AN ORGANIZED HEALTH CARE EDUCATION/TRAINING PROGRAM

## 2021-12-13 PROCEDURE — 63710000001 INSULIN ASPART PER 5 UNITS: Performed by: STUDENT IN AN ORGANIZED HEALTH CARE EDUCATION/TRAINING PROGRAM

## 2021-12-13 PROCEDURE — G0257 UNSCHED DIALYSIS ESRD PT HOS: HCPCS

## 2021-12-13 PROCEDURE — 80053 COMPREHEN METABOLIC PANEL: CPT | Performed by: STUDENT IN AN ORGANIZED HEALTH CARE EDUCATION/TRAINING PROGRAM

## 2021-12-13 PROCEDURE — 25010000002 EPOETIN ALFA-EPBX 10000 UNIT/ML SOLUTION: Performed by: STUDENT IN AN ORGANIZED HEALTH CARE EDUCATION/TRAINING PROGRAM

## 2021-12-13 PROCEDURE — 63710000001 INSULIN DETEMIR PER 5 UNITS: Performed by: STUDENT IN AN ORGANIZED HEALTH CARE EDUCATION/TRAINING PROGRAM

## 2021-12-13 PROCEDURE — 94664 DEMO&/EVAL PT USE INHALER: CPT

## 2021-12-13 PROCEDURE — 25010000002 HEPARIN (PORCINE) PER 1000 UNITS: Performed by: INTERNAL MEDICINE

## 2021-12-13 PROCEDURE — 86850 RBC ANTIBODY SCREEN: CPT | Performed by: STUDENT IN AN ORGANIZED HEALTH CARE EDUCATION/TRAINING PROGRAM

## 2021-12-13 RX ORDER — SODIUM CHLORIDE 9 MG/ML
100 INJECTION, SOLUTION INTRAVENOUS CONTINUOUS
Status: DISCONTINUED | OUTPATIENT
Start: 2021-12-13 | End: 2021-12-13

## 2021-12-13 RX ORDER — HEPARIN SODIUM 1000 [USP'U]/ML
2000 INJECTION, SOLUTION INTRAVENOUS; SUBCUTANEOUS AS NEEDED
Status: DISCONTINUED | OUTPATIENT
Start: 2021-12-13 | End: 2021-12-15 | Stop reason: HOSPADM

## 2021-12-13 RX ORDER — DEXTROSE MONOHYDRATE 25 G/50ML
25-50 INJECTION, SOLUTION INTRAVENOUS
Status: DISCONTINUED | OUTPATIENT
Start: 2021-12-13 | End: 2021-12-14 | Stop reason: SDUPTHER

## 2021-12-13 RX ORDER — ALBUMIN (HUMAN) 12.5 G/50ML
12.5 SOLUTION INTRAVENOUS AS NEEDED
Status: ACTIVE | OUTPATIENT
Start: 2021-12-13 | End: 2021-12-14

## 2021-12-13 RX ADMIN — METOPROLOL TARTRATE 50 MG: 50 TABLET, FILM COATED ORAL at 08:23

## 2021-12-13 RX ADMIN — FERROUS SULFATE TAB EC 324 MG (65 MG FE EQUIVALENT) 324 MG: 324 (65 FE) TABLET DELAYED RESPONSE at 08:23

## 2021-12-13 RX ADMIN — GABAPENTIN 800 MG: 400 CAPSULE ORAL at 22:26

## 2021-12-13 RX ADMIN — SODIUM CHLORIDE, PRESERVATIVE FREE 10 ML: 5 INJECTION INTRAVENOUS at 08:27

## 2021-12-13 RX ADMIN — CYCLOBENZAPRINE HYDROCHLORIDE 10 MG: 10 TABLET, FILM COATED ORAL at 20:26

## 2021-12-13 RX ADMIN — GABAPENTIN 800 MG: 400 CAPSULE ORAL at 06:03

## 2021-12-13 RX ADMIN — INSULIN DETEMIR 30 UNITS: 100 INJECTION, SOLUTION SUBCUTANEOUS at 21:23

## 2021-12-13 RX ADMIN — RANOLAZINE 500 MG: 500 TABLET, FILM COATED, EXTENDED RELEASE ORAL at 20:26

## 2021-12-13 RX ADMIN — LEVOTHYROXINE SODIUM 25 MCG: 25 TABLET ORAL at 06:31

## 2021-12-13 RX ADMIN — DOCUSATE SODIUM 50 MG AND SENNOSIDES 8.6 MG 2 TABLET: 8.6; 5 TABLET, FILM COATED ORAL at 08:24

## 2021-12-13 RX ADMIN — CYCLOBENZAPRINE HYDROCHLORIDE 10 MG: 10 TABLET, FILM COATED ORAL at 00:52

## 2021-12-13 RX ADMIN — HEPARIN SODIUM 4000 UNITS: 1000 INJECTION INTRAVENOUS; SUBCUTANEOUS at 17:38

## 2021-12-13 RX ADMIN — PANTOPRAZOLE SODIUM 40 MG: 40 TABLET, DELAYED RELEASE ORAL at 20:21

## 2021-12-13 RX ADMIN — SODIUM CHLORIDE, PRESERVATIVE FREE 10 ML: 5 INJECTION INTRAVENOUS at 00:01

## 2021-12-13 RX ADMIN — SODIUM CHLORIDE 19 UNITS/HR: 9 INJECTION, SOLUTION INTRAVENOUS at 18:10

## 2021-12-13 RX ADMIN — METOPROLOL TARTRATE 50 MG: 50 TABLET, FILM COATED ORAL at 20:19

## 2021-12-13 RX ADMIN — ISOSORBIDE MONONITRATE 30 MG: 30 TABLET, EXTENDED RELEASE ORAL at 06:31

## 2021-12-13 RX ADMIN — MONTELUKAST 10 MG: 10 TABLET, FILM COATED ORAL at 20:19

## 2021-12-13 RX ADMIN — SODIUM CHLORIDE 4 UNITS/HR: 9 INJECTION, SOLUTION INTRAVENOUS at 10:30

## 2021-12-13 RX ADMIN — INSULIN DETEMIR 30 UNITS: 100 INJECTION, SOLUTION SUBCUTANEOUS at 21:24

## 2021-12-13 RX ADMIN — IPRATROPIUM BROMIDE AND ALBUTEROL SULFATE 3 ML: 2.5; .5 SOLUTION RESPIRATORY (INHALATION) at 00:04

## 2021-12-13 RX ADMIN — INSULIN ASPART 40 UNITS: 100 INJECTION, SOLUTION INTRAVENOUS; SUBCUTANEOUS at 06:32

## 2021-12-13 RX ADMIN — IPRATROPIUM BROMIDE AND ALBUTEROL SULFATE 3 ML: 2.5; .5 SOLUTION RESPIRATORY (INHALATION) at 06:54

## 2021-12-13 RX ADMIN — BUMETANIDE 1 MG: 1 TABLET ORAL at 09:59

## 2021-12-13 RX ADMIN — HUMAN INSULIN 10 UNITS: 100 INJECTION, SOLUTION SUBCUTANEOUS at 00:01

## 2021-12-13 RX ADMIN — RANOLAZINE 500 MG: 500 TABLET, FILM COATED, EXTENDED RELEASE ORAL at 08:23

## 2021-12-13 RX ADMIN — HEPARIN SODIUM 5000 UNITS: 5000 INJECTION INTRAVENOUS; SUBCUTANEOUS at 22:26

## 2021-12-13 RX ADMIN — OXYBUTYNIN CHLORIDE 5 MG: 5 TABLET, EXTENDED RELEASE ORAL at 08:24

## 2021-12-13 RX ADMIN — LISINOPRIL 5 MG: 5 TABLET ORAL at 08:24

## 2021-12-13 RX ADMIN — BUMETANIDE 1 MG: 1 TABLET ORAL at 20:19

## 2021-12-13 RX ADMIN — ATORVASTATIN CALCIUM 20 MG: 20 TABLET, FILM COATED ORAL at 08:23

## 2021-12-13 RX ADMIN — ASPIRIN 81 MG: 81 TABLET, FILM COATED ORAL at 08:23

## 2021-12-13 RX ADMIN — SODIUM CHLORIDE, PRESERVATIVE FREE 10 ML: 5 INJECTION INTRAVENOUS at 20:18

## 2021-12-13 RX ADMIN — HEPARIN SODIUM 5000 UNITS: 5000 INJECTION INTRAVENOUS; SUBCUTANEOUS at 14:25

## 2021-12-13 RX ADMIN — HEPARIN SODIUM 5000 UNITS: 5000 INJECTION INTRAVENOUS; SUBCUTANEOUS at 06:03

## 2021-12-13 RX ADMIN — DULOXETINE HYDROCHLORIDE 20 MG: 20 CAPSULE, DELAYED RELEASE ORAL at 08:23

## 2021-12-13 RX ADMIN — CETIRIZINE HYDROCHLORIDE 10 MG: 10 TABLET, FILM COATED ORAL at 08:23

## 2021-12-13 RX ADMIN — IPRATROPIUM BROMIDE AND ALBUTEROL SULFATE 3 ML: 2.5; .5 SOLUTION RESPIRATORY (INHALATION) at 10:44

## 2021-12-13 RX ADMIN — NYSTATIN 1 APPLICATION: 100000 POWDER TOPICAL at 08:26

## 2021-12-13 RX ADMIN — CLOPIDOGREL BISULFATE 75 MG: 75 TABLET ORAL at 08:23

## 2021-12-13 RX ADMIN — EPOETIN ALFA-EPBX 10000 UNITS: 10000 INJECTION, SOLUTION INTRAVENOUS; SUBCUTANEOUS at 17:36

## 2021-12-13 RX ADMIN — HUMAN INSULIN 10 UNITS: 100 INJECTION, SOLUTION SUBCUTANEOUS at 03:37

## 2021-12-13 RX ADMIN — HUMAN INSULIN 15 UNITS: 100 INJECTION, SOLUTION SUBCUTANEOUS at 06:45

## 2021-12-13 RX ADMIN — NYSTATIN: 100000 POWDER TOPICAL at 20:22
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
## 2021-12-13 NOTE — ED NOTES
Matt schuster with bed flow she is able to see now that pt is changed to stepdown bed, bed assigned. Still awaiting page back from dr flores due to last fsbs 574 and prn insulin gtt fluids not ordered at this time.     Kylah New RN  12/13/21 [id number]

## 2021-12-13 NOTE — ED NOTES
Dr flores is to review chart and order any continuous or prn fluids, discussed pt is dialysis pt. Also informed of last fsbs and insulin gtt rate at this time.     Kylah New, RN  12/13/21 [id number]

## 2021-12-13 NOTE — NURSING NOTE
MD paged about a BS of 324. Per insulin order pt is already at max titration of 20 units/hr. MD stated that he is ok with leaving rate at 20 units/hr until BS are at a level where the unit dose can be titrated down.

## 2021-12-13 NOTE — ED NOTES
Dr flores paged to discuss fsbs, if prn fluids needed with insulin gtt, and bed status needing changed per landen with Kylah Hendrickson, RN  12/13/21 0875

## 2021-12-13 NOTE — ACP (ADVANCE CARE PLANNING)
Family Medicine Residency  Haily Mcneil MD          The patient desires to be full code. She designates Yamileth Chavez who can be contacted at [phone] to make medical decisions on her behalf if She is incapable of doing so. Patient declared said statement while fully alert and oriented on 12/12/21 at 21:44 CST.  Patient usually has her , however, he no longer has a telephone for contact.         This document has been electronically signed by Haily Mcneil MD on December 12, 2021 21:44 CST

## 2021-12-13 NOTE — H&P
HISTORY AND PHYSICAL  NAME: Yamileth Slater  : 1960  MRN: [id number]    DATE OF ADMISSION:  2021     DATE & TIME SEEN: 21 at 2143    PCP: Rianna Macias MD    CODE STATUS: Full code    CHIEF COMPLAINT:  No electricity at home from the [employer] and cannot use her Trilogy    HPI:  Yamileth Slater is a 61 y.o. 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 She denies any physical complaints and is due for dialysis tomorrow. She states that she has difficulty coming to her outpatient visits due to lack of transportation and recent dialysis schedule has complicated her being able to keep her outpatient appointments with PCP and specialists.      CONCURRENT MEDICAL HISTORY:  Past Medical History:   Diagnosis Date   • Acute blood loss anemia 2017    Likely due to gastric oozing at this time. - Dr. Duarte (GI) was consulted and has now signed off, will follow up outpatient - pill colonoscopy showed AVMs - continue to monitor   • Anxiety    • Carotid artery stenosis    • Chronic obstructive lung disease (HCC)    • CKD (chronic kidney disease) stage 4, GFR 15-29 ml/min (HCC)    • Colonic polyp    • Coronary arteriosclerosis    • Diabetes mellitus (HCC)    • Diabetic neuropathy (HCC)    • Ear pain, right 10/18/2021    - canal trauma due to patient scratching and DMT2 - added cortisporin ear drops   • Elevated troponin 10/12/2021    -most likely from CKD -Trending down -Neg chest pain   • GERD (gastroesophageal reflux disease)    • GI bleed 2021    - GI will follow up outpatient - Protonix 40mg daily - Avoid medical DVT prophy and use mechanical at this time instead. - Continue to  monitor - pill colonoscopy results showed AVMs   • History of transfusion    • Hypercholesterolemia    • Hypertension    • Hypomagnesemia 6/27/2021    Monitor and replace   • Morbid obesity (HCC)    • Nephrolithiasis    • Peripheral vascular disease (HCC)    • Sleep apnea    • Substance abuse (HCC)    • Vitamin D deficiency        PAST SURGICAL HISTORY:  Past Surgical History:   Procedure Laterality Date   • CARDIAC CATHETERIZATION N/A 7/14/2020   • CARDIAC CATHETERIZATION N/A 4/23/2021    Procedure: Left Heart Cath;  Surgeon: Melba Romo MD;  Location: [facility] CATH INVASIVE LOCATION;  Service: Cardiology;  Laterality: N/A;   • CARDIAC CATHETERIZATION N/A 4/30/2021    Procedure: Percutaneous Coronary Intervention;  Surgeon: Russell Voss MD;  Location: [facility] CATH INVASIVE LOCATION;  Service: Cardiovascular;  Laterality: N/A;   • CARDIAC CATHETERIZATION N/A 4/30/2021    Procedure: Stent NIKKI coronary;  Surgeon: Russell Voss MD;  Location: [facility] CATH INVASIVE LOCATION;  Service: Cardiovascular;  Laterality: N/A;   • CARDIAC CATHETERIZATION Left 11/13/2021    Procedure: Left Heart Cath;  Surgeon: Niall Rios MD;  Location: [facility] CATH INVASIVE LOCATION;  Service: Cardiology;  Laterality: Left;   • CAROTID STENT Left    • COLONOSCOPY     • COLONOSCOPY N/A 5/14/2021    Procedure: COLONOSCOPY;  Surgeon: Mingo Duarte MD;  Location: [facility] ENDOSCOPY;  Service: Gastroenterology;  Laterality: N/A;   • CORONARY ARTERY BYPASS GRAFT N/A 2013    CABG X 3   • CYSTOSCOPY BLADDER STONE LITHOTRIPSY Bilateral    • ENDOSCOPY N/A 4/12/2021    Procedure: ESOPHAGOGASTRODUODENOSCOPY;  Surgeon: Mingo Duarte MD;  Location: [facility] ENDOSCOPY;  Service: Gastroenterology;  Laterality: N/A;   • ENDOSCOPY N/A 5/14/2021    Procedure: ESOPHAGOGASTRODUODENOSCOPY;  Surgeon: Mingo Duarte MD;  Location: [facility] ENDOSCOPY;  Service: Gastroenterology;  Laterality: N/A;   • INTERVENTIONAL RADIOLOGY  PROCEDURE N/A 10/21/2021    Procedure: tunneled central venous catheter placement;  Surgeon: Donnie Robles MD;  Location: [facility] ANGIO INVASIVE LOCATION;  Service: Interventional Radiology;  Laterality: N/A;       FAMILY HISTORY:  Family History   Problem Relation Age of Onset   • Heart disease Mother    • Lung cancer Mother    • Heart disease Father    • Heart attack Father    • Diabetes Father    • Heart disease Half-Sister         Dad's side   • Heart disease Brother    • No Known Problems Sister    • No Known Problems Sister    • No Known Problems Sister    • No Known Problems Sister    • No Known Problems Sister    • Pancreatic cancer Half-Sister         Dad's side   • No Known Problems Brother    • No Known Problems Brother    • Heart attack Half-Brother    • Heart attack Half-Brother    • No Known Problems Maternal Grandmother    • No Known Problems Maternal Grandfather    • No Known Problems Paternal Grandmother    • No Known Problems Paternal Grandfather         SOCIAL HISTORY:  Social History     Socioeconomic History   • Marital status:      Spouse name: wesley dumont   Tobacco Use   • Smoking status: Former Smoker     Packs/day: 0.25     Years: 46.00     Pack years: 11.50     Types: Cigarettes   • Smokeless tobacco: Never Used   • Tobacco comment: only smoking 5 a day - quit april 23 2021   Substance and Sexual Activity   • Alcohol use: No   • Drug use: Not Currently     Types: LSD, Marijuana, Methamphetamines   • Sexual activity: Not Currently       HOME MEDICATIONS:  Prior to Admission medications    Medication Sig Start Date End Date Taking? Authorizing Provider   acetaminophen (Tylenol 8 Hour) 650 MG 8 hr tablet Take 1 tablet by mouth Every 8 (Eight) Hours As Needed for Mild Pain . 11/22/21  Yes Blake Burris MD   albuterol (PROVENTIL) (2.5 MG/3ML) 0.083% nebulizer solution Inhale the contents of 1 vial by nebulization Every 4 (Four) Hours As Needed for Wheezing. 10/28/21  Yes  Haily Mcneil MD   albuterol sulfate  (90 Base) MCG/ACT inhaler Inhale 2 puffs Every 4 (Four) Hours As Needed for Wheezing. 3/26/21  Yes Nirmal Calvillo MD   aspirin (aspirin) 81 MG EC tablet Take 1 tablet by mouth Daily. 5/1/21  Yes Jr Jaime Estrada MD   atorvastatin (LIPITOR) 20 MG tablet TAKE ONE TABLET BY MOUTH EVERY NIGHT AT BEDTIME 12/10/21  Yes Blake Burris MD   Blood Glucose Monitoring Suppl ([employer] Blood Glucose Meter) w/Device kit 1 each 3 (Three) Times a Day. 10/9/20  Yes Nirmal Calvillo MD   bumetanide (BUMEX) 1 MG tablet TAKE 1 TABLET BY MOUTH 2 (TWO) TIMES A DAY. 12/10/21  Yes Blake Burris MD   bumetanide (BUMEX) 2 MG tablet Take 1 tablet by mouth 4 (Four) Times a Week. 10/30/21  Yes Haily Mcneil MD   cetirizine (zyrTEC) 10 MG tablet Take 1 tablet by mouth Daily. 3/26/21  Yes Nirmal Calvillo MD   clopidogrel (PLAVIX) 75 MG tablet Take 1 tablet by mouth Daily. 3/26/21  Yes Nirmal Calvillo MD   Continuous Blood Gluc  (FreeStyle Jade 2 [place]) device 1 each Continuous. 3/31/21  Yes Nirmal Calvillo MD   Continuous Blood Gluc Sensor (FreeStyle Jade 2 Sensor) misc 1 each Every 14 (Fourteen) Days. 3/31/21  Yes Nirmal Calvillo MD   cyclobenzaprine (FLEXERIL) 10 MG tablet Take 1 tablet by mouth 2 (Two) Times a Day As Needed for Muscle Spasms. 3/26/21  Yes Nirmal Calvillo MD   DULoxetine (CYMBALTA) 20 MG capsule TAKE 1 CAPSULE BY MOUTH DAILY. 7/8/21  Yes Nirmal Calvillo MD   EASY TOUCH PEN NEEDLES 31G X 8 MM misc  8/7/20  Yes Caio Thompson MD   epoetin hubert-epbx (RETACRIT) 09126 UNIT/ML injection Inject 1 mL under the skin into the appropriate area as directed 3 (Three) Times a Week. Indications: ESRD on Dialysis 11/17/21  Yes Alvarado Mauricio MD   ferrous sulfate (FeroSul) 325 (65 FE) MG tablet Take 1 tablet by mouth Daily With Breakfast. 3/26/21  Yes Nirmal Calvillo MD   fluticasone  (FLONASE) 50 MCG/ACT nasal spray 2 sprays by Each Nare route Daily. 11/16/21  Yes Alvarado Mauricio MD   gabapentin (NEURONTIN) 800 MG tablet Take 1 tablet by mouth 3 (Three) Times a Day. 12/1/21  Yes Helder Lee MD   glucose blood test strip Use as instructed 10/9/20  Yes Nirmal Calvillo MD   insulin aspart (novoLOG FLEXPEN) 100 UNIT/ML solution pen-injector sc pen Inject 30 Units under the skin into the appropriate area as directed 3 (Three) Times a Day With Meals. 10/28/21  Yes Haily Mcneil MD   insulin detemir (LEVEMIR) 100 UNIT/ML injection Inject 60 Units under the skin into the appropriate area as directed Daily. 11/16/21  Yes Alvarado Mauricio MD   Insulin Pen Needle (NovoFine) 30G X 8 MM misc As directed 4 times daily 3/26/21  Yes Nirmal Calvillo MD   Insulin Pen Needle 31G X 8 MM misc Use to inject insulin 4 (Four) Times a Day as directed. 10/28/21  Yes Haily Mcneil MD   ipratropium-albuterol (DUO-NEB) 0.5-2.5 mg/3 ml nebulizer Take 3 mL by nebulization 4 (Four) Times a Day. 3/22/17  Yes Caio Thompson MD   isosorbide mononitrate (IMDUR) 30 MG 24 hr tablet Take 1 tablet by mouth Every Morning. 11/15/21  Yes Alvarado Mauricio MD   levothyroxine (SYNTHROID, LEVOTHROID) 25 MCG tablet Take 25 mcg by mouth Daily. 9/1/21  Yes Yadira Delarosa MD   lidocaine (LIDODERM) 5 % APPLY TO THE AFFECTED AREA(S) TOPICALLY TWO TIMES A DAY. REMOVE NIGHTLY 10/8/21  Yes Blake Burris MD   lisinopril (PRINIVIL,ZESTRIL) 5 MG tablet Take 1 tablet by mouth Daily. 11/16/21  Yes Alvarado Mauricio MD   metoprolol tartrate (LOPRESSOR) 50 MG tablet TAKE ONE TABLET BY MOUTH TWO TIMES A DAY. HOLD IF PULSE IS LESS THAN 60 12/10/21  Yes Blake Burris MD   montelukast (SINGULAIR) 10 MG tablet Take 1 tablet by mouth Every Night. 3/26/21  Yes Nirmal Calvillo MD   nystatin (MYCOSTATIN) 328642 UNIT/GM powder Apply  topically to the appropriate area as directed 3 (Three) Times a Day.  10/1/21  Yes Carline Coffey MD   O2 (OXYGEN) Inhale 3 L/min Continuous. 1/1/21  Yes Caio Thompson MD   ondansetron ODT (Zofran ODT) 4 MG disintegrating tablet Place 1 tablet on the tongue Every 8 (Eight) Hours As Needed for Nausea or Vomiting. 3/26/21  Yes Nirmal Calvillo MD   oxybutynin XL (DITROPAN-XL) 5 MG 24 hr tablet Take 1 tablet by mouth Daily. 3/26/21  Yes Nirmal Calvillo MD   pantoprazole (PROTONIX) 40 MG EC tablet Take 1 tablet by mouth Every Night. 3/26/21  Yes Nirmal Calvillo MD   promethazine (PHENERGAN) 25 MG tablet Take 25 mg by mouth Every 6 (Six) Hours As Needed. 7/13/21  Yes Caio Thompson MD   ranolazine (RANEXA) 500 MG 12 hr tablet Take 1 tablet by mouth Every 12 (Twelve) Hours. 11/15/21  Yes Alvarado Mauricio MD   sennosides-docusate (PERICOLACE) 8.6-50 MG per tablet Take 2 tablets by mouth 2 (Two) Times a Day. 10/28/21  Yes Haily Mcneil MD   nitroglycerin (NITROSTAT) 0.4 MG SL tablet Place 0.4 mg under the tongue Every 5 (Five) Minutes As Needed for Chest Pain (x 3 doses). 1/1/15   Caio Thompson MD       ALLERGIES:  Adhesive tape and Other    REVIEW OF SYSTEMS  Review of Systems   Constitutional: Negative for chills and fever.   HENT: Negative for rhinorrhea and sore throat.    Eyes: Negative for photophobia and visual disturbance.   Respiratory: Negative for cough and shortness of breath.    Cardiovascular: Negative for chest pain and palpitations.   Gastrointestinal: Negative for abdominal pain and nausea.   Genitourinary: Negative for difficulty urinating and flank pain.   Musculoskeletal: Negative for arthralgias and back pain.   Neurological: Negative for dizziness and headaches.   Psychiatric/Behavioral: Negative for confusion. The patient is not nervous/anxious.        PHYSICAL EXAM:  Temp:  [98.2 °F (36.8 °C)] 98.2 °F (36.8 °C)  Heart Rate:  [67-70] 67  Resp:  [20] 20  BP: (136-193)/(65-92) 173/78  Body mass index is 52.68  kg/m².  Physical Exam  Vitals reviewed.   Constitutional:       General: She is not in acute distress.     Appearance: Normal appearance. She is well-developed and well-groomed. She is morbidly obese.   HENT:      Head: Normocephalic.      Right Ear: Hearing and external ear normal.      Left Ear: Hearing and external ear normal.      Nose: Nose normal.      Mouth/Throat:      Lips: Pink.      Mouth: Mucous membranes are moist.      Pharynx: Oropharynx is clear. Uvula midline.   Eyes:      General: Lids are normal.      Conjunctiva/sclera: Conjunctivae normal.      Pupils: Pupils are equal, round, and reactive to light.   Cardiovascular:      Rate and Rhythm: Normal rate and regular rhythm.      Pulses:           Radial pulses are 2+ on the right side and 2+ on the left side.        Dorsalis pedis pulses are 1+ on the right side and 1+ on the left side.        Posterior tibial pulses are 1+ on the right side and 1+ on the left side.      Heart sounds: Normal heart sounds. No murmur heard.  No friction rub. No gallop.    Pulmonary:      Effort: Pulmonary effort is normal.      Breath sounds: Normal breath sounds. No wheezing, rhonchi or rales.   Chest:       Abdominal:      General: Abdomen is protuberant. Bowel sounds are normal.      Palpations: Abdomen is soft.      Tenderness: There is no abdominal tenderness.   Musculoskeletal:      Cervical back: Neck supple.      Right lower leg: No edema.      Left lower leg: No edema.   Skin:     General: Skin is warm.   Neurological:      Mental Status: She is alert and oriented to person, place, and time.   Psychiatric:         Attention and Perception: Attention and perception normal.         Mood and Affect: Mood and affect normal.         Speech: Speech normal.         Behavior: Behavior normal. Behavior is cooperative.         Thought Content: Thought content normal.         Cognition and Memory: Cognition and memory normal.         Judgment: Judgment normal.          DIAGNOSTIC DATA:   Lab Results (last 24 hours)     Procedure Component Value Units Date/Time    Blood Culture - Blood, Arm, Right [[id number]] Collected: 12/12/21 2219    Specimen: Blood from Arm, Right Updated: 12/12/21 2225    COVID-19 and FLU A/B PCR - Swab, Nasopharynx [[id number]]  (Normal) Collected: 12/12/21 2137    Specimen: Swab from Nasopharynx Updated: 12/12/21 2203     COVID19 Not Detected     Influenza A PCR Not Detected     Influenza B PCR Not Detected    Narrative:      Fact sheet for providers: https://www.fda.gov/media/287088/download    Fact sheet for patients: https://www.fda.gov/media/562727/download    Test performed by PCR.    Extra Tubes [[id number]] Collected: 12/12/21 2033    Specimen: Blood, Venous Line Updated: 12/12/21 2145    Narrative:      The following orders were created for panel order Extra Tubes.  Procedure                               Abnormality         Status                     ---------                               -----------         ------                     Gold Top - SST[[id number]]                                   Final result                 Please view results for these tests on the individual orders.    Gold Top - SST [[id number]] Collected: 12/12/21 2033    Specimen: Blood Updated: 12/12/21 2145     Extra Tube Hold for add-ons.     Comment: Auto resulted.       Blood Culture - Blood, Wrist, Right [[id number]] Collected: 12/12/21 2136    Specimen: Blood from Wrist, Right Updated: 12/12/21 2140    Manual Differential [[id number]]  (Abnormal) Collected: 12/12/21 2033    Specimen: Blood Updated: 12/12/21 2115     Neutrophil % 67.0 %      Lymphocyte % 18.0 %      Monocyte % 2.0 %      Eosinophil % 10.0 %      Basophil % 3.0 %      Neutrophils Absolute 5.82 10*3/mm3      Lymphocytes Absolute 1.56 10*3/mm3      Monocytes Absolute 0.17 10*3/mm3      Eosinophils Absolute 0.87 10*3/mm3      Basophils Absolute 0.26 10*3/mm3      Anisocytosis Mod/2+     Polychromasia Mod/2+      WBC Morphology Normal     Platelet Estimate Adequate    Troponin [[id number]]  (Abnormal) Collected: 12/12/21 2033    Specimen: Blood Updated: 12/12/21 2106     Troponin T 0.032 ng/mL     Narrative:      Troponin T Reference Range:  <= 0.03 ng/mL-   Negative for AMI  >0.03 ng/mL-     Abnormal for myocardial necrosis.  Clinicians would have to utilize clinical acumen, EKG, Troponin and serial changes to determine if it is an Acute Myocardial Infarction or myocardial injury due to an underlying chronic condition.       Results may be falsely decreased if patient taking Biotin.      Comprehensive Metabolic Panel [[id number]]  (Abnormal) Collected: 12/12/21 2033    Specimen: Blood Updated: 12/12/21 2105     Glucose 502 mg/dL      BUN 50 mg/dL      Creatinine 3.23 mg/dL      Sodium 128 mmol/L      Potassium 4.1 mmol/L      Chloride 92 mmol/L      CO2 26.0 mmol/L      Calcium 8.7 mg/dL      Total Protein 7.7 g/dL      Albumin 3.70 g/dL      ALT (SGPT) 10 U/L      AST (SGOT) 14 U/L      Alkaline Phosphatase 215 U/L      Total Bilirubin 0.2 mg/dL      eGFR Non African Amer 15 mL/min/1.73      Globulin 4.0 gm/dL      A/G Ratio 0.9 g/dL      BUN/Creatinine Ratio 15.5     Anion Gap 10.0 mmol/L     Narrative:      GFR Normal >60  Chronic Kidney Disease <60  Kidney Failure <15      BNP [[id number]]  (Abnormal) Collected: 12/12/21 2033    Specimen: Blood Updated: 12/12/21 2059     proBNP 4,156.0 pg/mL     Narrative:      Among patients with dyspnea, NT-proBNP is highly sensitive for the detection of acute congestive heart failure. In addition NT-proBNP of <300 pg/ml effectively rules out acute congestive heart failure with 99% negative predictive value.    Results may be falsely decreased if patient taking Biotin.      CBC & Differential [[id number]]  (Abnormal) Collected: 12/12/21 2033    Specimen: Blood Updated: 12/12/21 2047    Narrative:      The following orders were created for panel order CBC & Differential.  Procedure                                Abnormality         Status                     ---------                               -----------         ------                     CBC Auto Differential[[id number]]        Abnormal            Final result                 Please view results for these tests on the individual orders.    CBC Auto Differential [[id number]]  (Abnormal) Collected: 12/12/21 2033    Specimen: Blood Updated: 12/12/21 2047     WBC 8.69 10*3/mm3      RBC 2.73 10*6/mm3      Hemoglobin 7.7 g/dL      Hematocrit 24.4 %      MCV 89.4 fL      MCH 28.2 pg      MCHC 31.6 g/dL      RDW 17.6 %      RDW-SD 55.8 fl      MPV 9.1 fL      Platelets 240 10*3/mm3            Imaging Results (Last 24 Hours)     Procedure Component Value Units Date/Time    XR Chest 1 View [[id number]] Collected: 12/12/21 2024     Updated: 12/12/21 2100    Narrative:      EXAM:    XR Chest, 1 View    FACILITY:    [facility]    REFERRING:    ROXY DIAS    CLINICAL HISTORY:    61 years, Female; dyspnea    TECHNIQUE:    Frontal view of the chest.    COMPARISON:    November 8, 2021    FINDINGS:    Lungs:  Airspace and interstitial infiltrates seen within the  right lung.    Pleural space:  Right pleural effusion.        No pneumothorax.    Heart:  Stable cardiac silhouette.    Mediastinum:  No acute pathology    Bones/joints:  Midline sternotomy wires.    Tubes, lines and devices:  Dual-lumen central venous catheter  again noted in similar position.      Impression:        Airspace and interstitial infiltrates seen within the right  lung.  Right pleural effusion.    Electronically signed by:  Radha De Leon DO  12/12/2021 8:58  PM CST Workstation: [id number]            I reviewed the patient's new clinical results.  I reviewed the patient's new imaging results and agree with the interpretation.  Discussed with Dr. Wells    ASSESSMENT AND PLAN: This is a 61 y.o. female with:    Active Hospital Problems    Diagnosis  POA   •  **Chronic respiratory failure with hypoxia ([facility]) [J96.11]  Yes     · Uses home O2 3L for sats 88%-92%  · Currently on room air  · Strict maintain sats 88%-92% for respiratory drive  · Home CPAP Trilogy at night  · If decompensates, stat ABG, CXR, EKG  · CXR showed shelley infiltrative changes on the right, with right pleural effusion, consistent with several prior CXR in the last few months  · Azithromycin & Ceftriaxone X 1 in the ER.     • Personal history of noncompliance with medical treatment, presenting hazards to health [Z91.19]  Not Applicable     · Difficulty showing up to clinic visits due to complexity of medical problems and transportation  · Would really benefit from a couple home visits from PCP to help improve compliance     • Anemia due to chronic kidney disease, on chronic dialysis ([facility]) [N18.6, D63.1, Z99.2]  Not Applicable     · Nephrology consult in am for dialysis scheduled MWF  · Epoetin (Retacrit) 10,000 units three times a week on dialysis days MWF  · If Hgb less than 7 transfuse PRBCs  · Type & Screen  · Trend H&H, daily CBC     • Hyponatremia [E87.1]  Yes     · Corrected for glucose 138  · Monitor     • MIKE and COPD overlap syndrome ([facility]) [G47.33, J44.9]  Yes     · Home Trilogy     • CKD (chronic kidney disease), symptom management only, stage 5 ([facility]) [N18.5]  Yes     Results from last 7 days   Lab Units 12/12/21  2033   CREATININE mg/dL 3.23*     · Baseline creatinine 2-3 GFR 13-25  · GFR 15  · Dialysis MWF, sees Dr. Lauren  · Nephrology consult in am, appreciate recommendations  · Continue Bumex 1mg bid daily  · Holding Bumex 2mg 4 times a week     • Hypothyroidism [E03.9]  Yes     · Levothyroxine 25mcg  Lab Results   Component Value Date    TSH 1.120 04/24/2021 ·          • Uncontrolled type 2 diabetes mellitus with complication, with long-term current use of insulin ([facility]) [E11.8, E11.65, Z79.4]  Not Applicable     · Levemir 60 units nightly divided into two 30 units doses to be  administered together  · Novolog 40 units tid before meals  · High dose SSI  · States she has an insulin pump at home from Dr. Calvillo but has never been able to come to clinic to have it installed.   · Has a Freestyle payam  · Continue Gabapentin 800mg tid  · Continue Duloxetine 20mg daily  Lab Results   Component Value Date    HGBA1C 8.80 (H) 10/26/2021 ·     ·      • Dyslipidemia [E78.5]  Yes     · Continue Atorvastatin 20mg daily  Lab Results   Component Value Date    CHOL 130 06/02/2021    CHLPL 153 11/09/2016    TRIG 338 (H) 06/02/2021    HDL 39 (L) 06/02/2021    LDL 41 06/02/2021 ·          • Essential hypertension [I10]  Yes     · Elevated BP in ER SBP 160s  · Metoprolol 50mg bid, hold if HR < 60bpm  · Lisinopril 5mg daily  · Telemetry  · Hydralazine po 10mg prn q6h for SBP > 170     • Coronary artery disease [I25.10]  Yes     · S/P CABG x 3 (Primary), Bilateral carotid artery stenosis w/ 2 stents on left side,  PAD (peripheral artery disease) with stent in right upper leg, Morbid obesity Body mass index is 52.68 kg/m².  · Continue aspirin, Plavix, atorvastatin, lisinopril,  isosorbide mononitrate 60 mg daily   · EKG: No ST segment changes.   · Troponin elevated chronically 0.032, trend X 2  · Last cath 11/13/21: Left heart catheterization with iliofemoral arteriogram. Pronto thrombectomy of the left main coronary artery. PTCA and stenting of the distal left main and ostial circumflex artery with deployment of a 3.25 mm x 15 mm Xience Gloria drug-eluting stent. Selective cannulation of the left internal mammary artery. Selective cannulation of the saphenous vein graft to the right coronary artery and a saphenous vein graft to the ramus intermedius branch. Not a candidate for single vessel CABG. Sees Dr. Rios.   · Ranolazine 500mg bid       • Morbid obesity with BMI of 50.0-59.9, adult (HCC) [E66.01, Z68.43]  Not Applicable     · Body mass index is 52.68 kg/m².   · Consistent carb, cardiac, diet          Mechanical Order History:     None      Pharmalogical Order History:      Ordered     Dose Route Frequency Stop    12/12/21 2300  heparin (porcine) 5000 UNIT/ML injection 5,000 Units         5,000 Units SC Every 8 Hours Scheduled --                Yamileth Nga Bryon and I have discussed pain goals for this hospitalization after reviewing her current clinical condition, medical history and prior pain experiences.  The goal is to keep the pain level 6/10.  To help achieve this, I plan to provide her home Gabapentin.    JULIAN # PDMP, reviewed and consistent with patient reported medications.    Expected Length of Stay: Where: home and When:  2 days    I discussed the patient's findings and my recommendations with patient and primary care team.     Alex Wells MD is the attending on record at time of admission, He is aware of the patient's status and agrees with the above history and physical.        This document has been electronically signed by Haily Mcneil MD on December 12, 2021 23:40 CST

## 2021-12-13 NOTE — PROGRESS NOTES
FAMILY MEDICINE DAILY PROGRESS NOTE  NAME: Yamileth Slater  : 1960  MRN: [id number]     LOS: 0 days     PROVIDER OF SERVICE: Jerman Coffman MD    Chief Complaint: Chronic respiratory failure with hypoxia (HCC)    Subjective:     Interval History:  History taken from: patient chart RN    Patient using home trilogy CPAP this morning. She had not been able to use it since Friday secondary to power loss. She began feeling weak yesterday.  Patient is due for dialysis.  Patient complained of chronic low back pain.  She was about to get a breathing treatment this morning.  Her creatinine was 3.23.  Hemoglobin was 7.4.  Patient's blood sugar has been difficult to control overnight.  Her most recent reading was 521 at 754.  She has received her Levemir 30 units and has received 75 units of short acting insulin thus far.  Discussed with nursing and asked for recheck 2 hours after her 40 or 9 AM.    Review of Systems:   Review of Systems   Constitutional: Negative for chills, fatigue and fever.   HENT: Negative for congestion, postnasal drip and rhinorrhea.    Respiratory: Negative for cough and shortness of breath.    Cardiovascular: Negative for chest pain, palpitations and leg swelling.   Gastrointestinal: Negative for abdominal pain, diarrhea, nausea and vomiting.   Genitourinary: Negative for difficulty urinating, dysuria and hematuria.   Musculoskeletal: Positive for back pain. Negative for arthralgias.   Skin: Negative for rash and wound.   Neurological: Negative for syncope, weakness and headaches.       Objective:     Vital Signs  Temp:  [98.2 °F (36.8 °C)] 98.2 °F (36.8 °C)  Heart Rate:  [61-81] 61  Resp:  [20] 20  BP: (125-193)/(56-92) 137/62    Physical Exam  Physical Exam  Vitals reviewed.   Constitutional:       Appearance: She is well-developed. She is obese. She is not diaphoretic.   HENT:      Head: Normocephalic and atraumatic.      Comments: Trilogy mask on     Mouth/Throat:      Pharynx: No  oropharyngeal exudate.   Eyes:      General: No scleral icterus.     Conjunctiva/sclera: Conjunctivae normal.      Pupils: Pupils are equal, round, and reactive to light.   Cardiovascular:      Rate and Rhythm: Normal rate and regular rhythm.      Heart sounds: No murmur heard.      Pulmonary:      Effort: Pulmonary effort is normal. No respiratory distress.      Breath sounds: Normal breath sounds.   Chest:      Comments: Dialysis catheter in place  Abdominal:      General: Bowel sounds are normal. There is no distension.      Palpations: Abdomen is soft.      Tenderness: There is no abdominal tenderness. Negative signs include Frazier's sign.   Musculoskeletal:         General: No deformity.      Right lower leg: No edema.      Left lower leg: No edema.   Skin:     General: Skin is warm and dry.   Neurological:      General: No focal deficit present.      Mental Status: She is alert. Mental status is at baseline.   Psychiatric:         Mood and Affect: Mood normal.         Behavior: Behavior normal.         Medication Review    Current Facility-Administered Medications:   •  albuterol (PROVENTIL) nebulizer solution 0.083% 2.5 mg/3mL, 2.5 mg, Nebulization, Q4H PRN, Haily Mcneil MD  •  aspirin EC tablet 81 mg, 81 mg, Oral, Daily, Haily Mcneil MD, 81 mg at 12/13/21 0823  •  atorvastatin (LIPITOR) tablet 20 mg, 20 mg, Oral, Daily, Haily Mcneil MD, 20 mg at 12/13/21 0823  •  bumetanide (BUMEX) tablet 1 mg, 1 mg, Oral, BID, Haily Mcneil MD, 1 mg at 12/12/21 2352  •  cetirizine (zyrTEC) tablet 10 mg, 10 mg, Oral, Daily, Haily Mcneil MD, 10 mg at 12/13/21 0823  •  clopidogrel (PLAVIX) tablet 75 mg, 75 mg, Oral, Daily, Haily Mcneil MD, 75 mg at 12/13/21 0823  •  cyclobenzaprine (FLEXERIL) tablet 10 mg, 10 mg, Oral, BID PRN, Haily Mcneil MD, 10 mg at 12/13/21 0052  •  dextrose (D50W) (25 g/50 mL) IV injection 25 g, 25 g, Intravenous, Q15 Min PRN, Haily Mcneil MD  •  dextrose (GLUTOSE) oral gel 15 g, 15 g,  Oral, Q15 Min PRN, Haily Mcneil MD  •  DULoxetine (CYMBALTA) DR capsule 20 mg, 20 mg, Oral, Daily, Haily Mcneil MD, 20 mg at 12/13/21 0823  •  epoetin hubert-epbx (RETACRIT) injection 10,000 Units, 10,000 Units, Subcutaneous, Once per day on Mon Wed Fri, Haily Mcneil MD  •  ferrous sulfate EC tablet 324 mg, 324 mg, Oral, Daily With Breakfast, Haily Mcneil MD, 324 mg at 12/13/21 0823  •  gabapentin (NEURONTIN) capsule 800 mg, 800 mg, Oral, Q8H, Haily Mcneil MD, 800 mg at 12/13/21 0603  •  glucagon (human recombinant) (GLUCAGEN DIAGNOSTIC) injection 1 mg, 1 mg, Subcutaneous, Q15 Min PRN, Haily Mcneil MD  •  heparin (porcine) 5000 UNIT/ML injection 5,000 Units, 5,000 Units, Subcutaneous, Q8H, Haily Mcneil MD, 5,000 Units at 12/13/21 0603  •  hydrALAZINE (APRESOLINE) tablet 10 mg, 10 mg, Oral, Q6H PRN, Haily Mcneil MD  •  insulin aspart (novoLOG) injection 0-24 Units, 0-24 Units, Subcutaneous, TID AC, Haily Mcneil MD  •  insulin aspart (novoLOG) injection 40 Units, 40 Units, Subcutaneous, TID With Meals, Haily Mcneil MD, 40 Units at 12/13/21 0632  •  insulin detemir (LEVEMIR) injection 30 Units, 30 Units, Subcutaneous, Nightly, 30 Units at 12/12/21 2354 **AND** insulin detemir (LEVEMIR) injection 30 Units, 30 Units, Subcutaneous, Nightly, Haily Mcneil MD, 30 Units at 12/12/21 2354  •  ipratropium-albuterol (DUO-NEB) nebulizer solution 3 mL, 3 mL, Nebulization, 4x Daily - RT, Haily Mcneil MD, 3 mL at 12/13/21 0654  •  isosorbide mononitrate (IMDUR) 24 hr tablet 30 mg, 30 mg, Oral, QAM, Haily Mcneil MD, 30 mg at 12/13/21 0631  •  levothyroxine (SYNTHROID, LEVOTHROID) tablet 25 mcg, 25 mcg, Oral, Q AM, Haily Mcneil MD, 25 mcg at 12/13/21 0631  •  lisinopril (PRINIVIL,ZESTRIL) tablet 5 mg, 5 mg, Oral, Daily, Haily Mcneil MD, 5 mg at 12/13/21 0824  •  metoprolol tartrate (LOPRESSOR) tablet 50 mg, 50 mg, Oral, Q12H, Haily Mcneil MD, 50 mg at 12/13/21 0823  •  montelukast (SINGULAIR) tablet 10 mg,  10 mg, Oral, Nightly, Haily Mcneil MD, 10 mg at 12/12/21 2353  •  nystatin (MYCOSTATIN) powder, , Topical, TID, Haily Mcneil MD, 1 application at 12/13/21 0826  •  ondansetron ODT (ZOFRAN-ODT) disintegrating tablet 4 mg, 4 mg, Oral, Q8H PRN, Haily Mcneil MD  •  oxybutynin XL (DITROPAN-XL) 24 hr tablet 5 mg, 5 mg, Oral, Daily, Haily Mcneil MD, 5 mg at 12/13/21 0824  •  pantoprazole (PROTONIX) EC tablet 40 mg, 40 mg, Oral, Nightly, Haily Mcneil MD, 40 mg at 12/12/21 2353  •  ranolazine (RANEXA) 12 hr tablet 500 mg, 500 mg, Oral, Q12H, Haily Mcneil MD, 500 mg at 12/13/21 0823  •  sennosides-docusate (PERICOLACE) 8.6-50 MG per tablet 2 tablet, 2 tablet, Oral, BID, Haily Mcneil MD, 2 tablet at 12/13/21 0824  •  [COMPLETED] Insert peripheral IV, , , Once **AND** sodium chloride 0.9 % flush 10 mL, 10 mL, Intravenous, PRN, Haily Mcneil MD  •  sodium chloride 0.9 % flush 10 mL, 10 mL, Intravenous, Q12H, Haily Mcneil MD, 10 mL at 12/13/21 0827  •  sodium chloride 0.9 % flush 10 mL, 10 mL, Intravenous, PRN, Haily Mcneil MD    Current Outpatient Medications:   •  acetaminophen (Tylenol 8 Hour) 650 MG 8 hr tablet, Take 1 tablet by mouth Every 8 (Eight) Hours As Needed for Mild Pain ., Disp: 90 tablet, Rfl: 0  •  albuterol (PROVENTIL) (2.5 MG/3ML) 0.083% nebulizer solution, Inhale the contents of 1 vial by nebulization Every 4 (Four) Hours As Needed for Wheezing., Disp: 75 mL, Rfl: 3  •  albuterol sulfate  (90 Base) MCG/ACT inhaler, Inhale 2 puffs Every 4 (Four) Hours As Needed for Wheezing., Disp: 18 g, Rfl: 1  •  aspirin (aspirin) 81 MG EC tablet, Take 1 tablet by mouth Daily., Disp: 60 tablet, Rfl: 5  •  atorvastatin (LIPITOR) 20 MG tablet, TAKE ONE TABLET BY MOUTH EVERY NIGHT AT BEDTIME, Disp: 30 tablet, Rfl: 1  •  Blood Glucose Monitoring Suppl ([employer] Blood Glucose Meter) w/Device kit, 1 each 3 (Three) Times a Day., Disp: 1 kit, Rfl: 3  •  bumetanide (BUMEX) 1 MG tablet, TAKE 1 TABLET BY MOUTH 2  (TWO) TIMES A DAY., Disp: 60 tablet, Rfl: 3  •  bumetanide (BUMEX) 2 MG tablet, Take 1 tablet by mouth 4 (Four) Times a Week., Disp: 30 tablet, Rfl: 0  •  cetirizine (zyrTEC) 10 MG tablet, Take 1 tablet by mouth Daily., Disp: 30 tablet, Rfl: 3  •  clopidogrel (PLAVIX) 75 MG tablet, Take 1 tablet by mouth Daily., Disp: 30 tablet, Rfl: 5  •  Continuous Blood Gluc  (FreeStyle Jade 2 [employer]) device, 1 each Continuous., Disp: 1 each, Rfl: 0  •  Continuous Blood Gluc Sensor (FreeStyle Jade 2 Sensor) misc, 1 each Every 14 (Fourteen) Days., Disp: 2 each, Rfl: 11  •  cyclobenzaprine (FLEXERIL) 10 MG tablet, Take 1 tablet by mouth 2 (Two) Times a Day As Needed for Muscle Spasms., Disp: 60 tablet, Rfl: 5  •  DULoxetine (CYMBALTA) 20 MG capsule, TAKE 1 CAPSULE BY MOUTH DAILY., Disp: 30 capsule, Rfl: 2  •  EASY TOUCH PEN NEEDLES 31G X 8 MM misc, , Disp: , Rfl:   •  epoetin hubert-epbx (RETACRIT) 96413 UNIT/ML injection, Inject 1 mL under the skin into the appropriate area as directed 3 (Three) Times a Week. Indications: ESRD on Dialysis, Disp: 6.6 mL, Rfl: 0  •  ferrous sulfate (FeroSul) 325 (65 FE) MG tablet, Take 1 tablet by mouth Daily With Breakfast., Disp: 30 tablet, Rfl: 3  •  fluticasone (FLONASE) 50 MCG/ACT nasal spray, 2 sprays by Each Nare route Daily., Disp: 9.9 mL, Rfl: 0  •  gabapentin (NEURONTIN) 800 MG tablet, Take 1 tablet by mouth 3 (Three) Times a Day., Disp: 90 tablet, Rfl: 0  •  glucose blood test strip, Use as instructed, Disp: 100 each, Rfl: 12  •  insulin aspart (novoLOG FLEXPEN) 100 UNIT/ML solution pen-injector sc pen, Inject 30 Units under the skin into the appropriate area as directed 3 (Three) Times a Day With Meals., Disp: 30 mL, Rfl: 12  •  insulin detemir (LEVEMIR) 100 UNIT/ML injection, Inject 60 Units under the skin into the appropriate area as directed Daily., Disp: 2 mL, Rfl: 12  •  Insulin Pen Needle (NovoFine) 30G X 8 MM misc, As directed 4 times daily, Disp: 100 each, Rfl: 11  •   Insulin Pen Needle 31G X 8 MM misc, Use to inject insulin 4 (Four) Times a Day as directed., Disp: 120 each, Rfl: 12  •  ipratropium-albuterol (DUO-NEB) 0.5-2.5 mg/3 ml nebulizer, Take 3 mL by nebulization 4 (Four) Times a Day., Disp: , Rfl:   •  isosorbide mononitrate (IMDUR) 30 MG 24 hr tablet, Take 1 tablet by mouth Every Morning., Disp: 30 tablet, Rfl: 0  •  levothyroxine (SYNTHROID, LEVOTHROID) 25 MCG tablet, Take 25 mcg by mouth Daily., Disp: , Rfl:   •  lidocaine (LIDODERM) 5 %, APPLY TO THE AFFECTED AREA(S) TOPICALLY TWO TIMES A DAY. REMOVE NIGHTLY, Disp: 30 patch, Rfl: 1  •  lisinopril (PRINIVIL,ZESTRIL) 5 MG tablet, Take 1 tablet by mouth Daily., Disp: 30 tablet, Rfl: 0  •  metoprolol tartrate (LOPRESSOR) 50 MG tablet, TAKE ONE TABLET BY MOUTH TWO TIMES A DAY. HOLD IF PULSE IS LESS THAN 60, Disp: 60 tablet, Rfl: 1  •  montelukast (SINGULAIR) 10 MG tablet, Take 1 tablet by mouth Every Night., Disp: 30 tablet, Rfl: 5  •  nystatin (MYCOSTATIN) 339255 UNIT/GM powder, Apply  topically to the appropriate area as directed 3 (Three) Times a Day., Disp: 15 g, Rfl: 1  •  O2 (OXYGEN), Inhale 3 L/min Continuous., Disp: , Rfl:   •  ondansetron ODT (Zofran ODT) 4 MG disintegrating tablet, Place 1 tablet on the tongue Every 8 (Eight) Hours As Needed for Nausea or Vomiting., Disp: 30 tablet, Rfl: 0  •  oxybutynin XL (DITROPAN-XL) 5 MG 24 hr tablet, Take 1 tablet by mouth Daily., Disp: 90 tablet, Rfl: 1  •  pantoprazole (PROTONIX) 40 MG EC tablet, Take 1 tablet by mouth Every Night., Disp: 30 tablet, Rfl: 5  •  promethazine (PHENERGAN) 25 MG tablet, Take 25 mg by mouth Every 6 (Six) Hours As Needed., Disp: , Rfl:   •  ranolazine (RANEXA) 500 MG 12 hr tablet, Take 1 tablet by mouth Every 12 (Twelve) Hours., Disp: 30 tablet, Rfl: 0  •  sennosides-docusate (PERICOLACE) 8.6-50 MG per tablet, Take 2 tablets by mouth 2 (Two) Times a Day., Disp: 60 tablet, Rfl: 2  •  nitroglycerin (NITROSTAT) 0.4 MG SL tablet, Place 0.4 mg under  the tongue Every 5 (Five) Minutes As Needed for Chest Pain (x 3 doses)., Disp: , Rfl:      Diagnostic Data    Lab Results (last 24 hours)     Procedure Component Value Units Date/Time    CBC Auto Differential [[id number]]  (Abnormal) Collected: 12/13/21 0242    Specimen: Blood Updated: 12/13/21 0316     WBC 8.88 10*3/mm3      RBC 2.62 10*6/mm3      Hemoglobin 7.4 g/dL      Hematocrit 23.3 %      MCV 88.9 fL      MCH 28.2 pg      MCHC 31.8 g/dL      RDW 17.2 %      RDW-SD 54.5 fl      MPV 8.8 fL      Platelets 192 10*3/mm3      Neutrophil % 85.3 %      Lymphocyte % 10.7 %      Monocyte % 1.5 %      Eosinophil % 0.5 %      Basophil % 0.6 %      Immature Grans % 1.4 %      Neutrophils, Absolute 7.59 10*3/mm3      Lymphocytes, Absolute 0.95 10*3/mm3      Monocytes, Absolute 0.13 10*3/mm3      Eosinophils, Absolute 0.04 10*3/mm3      Basophils, Absolute 0.05 10*3/mm3      Immature Grans, Absolute 0.12 10*3/mm3      nRBC 0.0 /100 WBC     Comprehensive Metabolic Panel [[id number]]  (Abnormal) Collected: 12/13/21 0242    Specimen: Blood Updated: 12/13/21 0311     Glucose 516 mg/dL      BUN 51 mg/dL      Creatinine 3.23 mg/dL      Sodium 128 mmol/L      Potassium 4.9 mmol/L      Chloride 94 mmol/L      CO2 22.0 mmol/L      Calcium 8.5 mg/dL      Total Protein 7.6 g/dL      Albumin 3.60 g/dL      ALT (SGPT) 11 U/L      AST (SGOT) 13 U/L      Alkaline Phosphatase 231 U/L      Total Bilirubin 0.2 mg/dL      eGFR Non African Amer 15 mL/min/1.73      Globulin 4.0 gm/dL      A/G Ratio 0.9 g/dL      BUN/Creatinine Ratio 15.8     Anion Gap 12.0 mmol/L     Narrative:      GFR Normal >60  Chronic Kidney Disease <60  Kidney Failure <15      Troponin [[id number]]  (Normal) Collected: 12/13/21 0242    Specimen: Blood Updated: 12/13/21 0308     Troponin T 0.023 ng/mL     Narrative:      Troponin T Reference Range:  <= 0.03 ng/mL-   Negative for AMI  >0.03 ng/mL-     Abnormal for myocardial necrosis.  Clinicians would have to utilize clinical  acumen, EKG, Troponin and serial changes to determine if it is an Acute Myocardial Infarction or myocardial injury due to an underlying chronic condition.       Results may be falsely decreased if patient taking Biotin.      Troponin [[id number]]  (Normal) Collected: 12/13/21 0012    Specimen: Blood Updated: 12/13/21 0035     Troponin T 0.025 ng/mL     Narrative:      Troponin T Reference Range:  <= 0.03 ng/mL-   Negative for AMI  >0.03 ng/mL-     Abnormal for myocardial necrosis.  Clinicians would have to utilize clinical acumen, EKG, Troponin and serial changes to determine if it is an Acute Myocardial Infarction or myocardial injury due to an underlying chronic condition.       Results may be falsely decreased if patient taking Biotin.      Blood Culture - Blood, Arm, Right [[id number]] Collected: 12/12/21 2219    Specimen: Blood from Arm, Right Updated: 12/12/21 2225    COVID-19 and FLU A/B PCR - Swab, Nasopharynx [[id number]]  (Normal) Collected: 12/12/21 2137    Specimen: Swab from Nasopharynx Updated: 12/12/21 2203     COVID19 Not Detected     Influenza A PCR Not Detected     Influenza B PCR Not Detected    Narrative:      Fact sheet for providers: https://www.fda.gov/media/338991/download    Fact sheet for patients: https://www.fda.gov/media/799327/download    Test performed by PCR.    Extra Tubes [[id number]] Collected: 12/12/21 2033    Specimen: Blood, Venous Line Updated: 12/12/21 2145    Narrative:      The following orders were created for panel order Extra Tubes.  Procedure                               Abnormality         Status                     ---------                               -----------         ------                     Gold Top - SST[[id number]]                                   Final result                 Please view results for these tests on the individual orders.    Gold Top - SST [[id number]] Collected: 12/12/21 2033    Specimen: Blood Updated: 12/12/21 2145     Extra Tube Hold for  add-ons.     Comment: Auto resulted.       Blood Culture - Blood, Wrist, Right [[id number]] Collected: 12/12/21 2136    Specimen: Blood from Wrist, Right Updated: 12/12/21 2140    Manual Differential [[id number]]  (Abnormal) Collected: 12/12/21 2033    Specimen: Blood Updated: 12/12/21 2115     Neutrophil % 67.0 %      Lymphocyte % 18.0 %      Monocyte % 2.0 %      Eosinophil % 10.0 %      Basophil % 3.0 %      Neutrophils Absolute 5.82 10*3/mm3      Lymphocytes Absolute 1.56 10*3/mm3      Monocytes Absolute 0.17 10*3/mm3      Eosinophils Absolute 0.87 10*3/mm3      Basophils Absolute 0.26 10*3/mm3      Anisocytosis Mod/2+     Polychromasia Mod/2+     WBC Morphology Normal     Platelet Estimate Adequate    Troponin [[id number]]  (Abnormal) Collected: 12/12/21 2033    Specimen: Blood Updated: 12/12/21 2106     Troponin T 0.032 ng/mL     Narrative:      Troponin T Reference Range:  <= 0.03 ng/mL-   Negative for AMI  >0.03 ng/mL-     Abnormal for myocardial necrosis.  Clinicians would have to utilize clinical acumen, EKG, Troponin and serial changes to determine if it is an Acute Myocardial Infarction or myocardial injury due to an underlying chronic condition.       Results may be falsely decreased if patient taking Biotin.      Comprehensive Metabolic Panel [[id number]]  (Abnormal) Collected: 12/12/21 2033    Specimen: Blood Updated: 12/12/21 2105     Glucose 502 mg/dL      BUN 50 mg/dL      Creatinine 3.23 mg/dL      Sodium 128 mmol/L      Potassium 4.1 mmol/L      Chloride 92 mmol/L      CO2 26.0 mmol/L      Calcium 8.7 mg/dL      Total Protein 7.7 g/dL      Albumin 3.70 g/dL      ALT (SGPT) 10 U/L      AST (SGOT) 14 U/L      Alkaline Phosphatase 215 U/L      Total Bilirubin 0.2 mg/dL      eGFR Non African Amer 15 mL/min/1.73      Globulin 4.0 gm/dL      A/G Ratio 0.9 g/dL      BUN/Creatinine Ratio 15.5     Anion Gap 10.0 mmol/L     Narrative:      GFR Normal >60  Chronic Kidney Disease <60  Kidney Failure <15      BNP  [[id number]]  (Abnormal) Collected: 12/12/21 2033    Specimen: Blood Updated: 12/12/21 2059     proBNP 4,156.0 pg/mL     Narrative:      Among patients with dyspnea, NT-proBNP is highly sensitive for the detection of acute congestive heart failure. In addition NT-proBNP of <300 pg/ml effectively rules out acute congestive heart failure with 99% negative predictive value.    Results may be falsely decreased if patient taking Biotin.      CBC & Differential [[id number]]  (Abnormal) Collected: 12/12/21 2033    Specimen: Blood Updated: 12/12/21 2047    Narrative:      The following orders were created for panel order CBC & Differential.  Procedure                               Abnormality         Status                     ---------                               -----------         ------                     CBC Auto Differential[[id number]]        Abnormal            Final result                 Please view results for these tests on the individual orders.    CBC Auto Differential [[id number]]  (Abnormal) Collected: 12/12/21 2033    Specimen: Blood Updated: 12/12/21 2047     WBC 8.69 10*3/mm3      RBC 2.73 10*6/mm3      Hemoglobin 7.7 g/dL      Hematocrit 24.4 %      MCV 89.4 fL      MCH 28.2 pg      MCHC 31.6 g/dL      RDW 17.6 %      RDW-SD 55.8 fl      MPV 9.1 fL      Platelets 240 10*3/mm3            Imaging Results (Last 24 Hours)     Procedure Component Value Units Date/Time    XR Chest 1 View [[id number]] Collected: 12/12/21 2024     Updated: 12/12/21 2100    Narrative:      EXAM:    XR Chest, 1 View    FACILITY:    [facility]    REFERRING:    ROXY DIAS    CLINICAL HISTORY:    61 years, Female; dyspnea    TECHNIQUE:    Frontal view of the chest.    COMPARISON:    November 8, 2021    FINDINGS:    Lungs:  Airspace and interstitial infiltrates seen within the  right lung.    Pleural space:  Right pleural effusion.        No pneumothorax.    Heart:  Stable cardiac silhouette.    Mediastinum:  No acute  pathology    Bones/joints:  Midline sternotomy wires.    Tubes, lines and devices:  Dual-lumen central venous catheter  again noted in similar position.      Impression:        Airspace and interstitial infiltrates seen within the right  lung.  Right pleural effusion.    Electronically signed by:  Radha De Leon DO  12/12/2021 8:58  PM CST Workstation: [id number]          I reviewed the patient's new clinical results.    Assessment/Plan:     Active Hospital Problems    Diagnosis    • **Chronic respiratory failure with hypoxia (HCC)      · Uses home O2 3L for sats 88%-92%  · Currently on room air  · Strict maintain sats 88%-92% for respiratory drive  · Home CPAP Trilogy at night  · If decompensates, stat ABG, CXR, EKG  · CXR showed shelley infiltrative changes on the right, with right pleural effusion, consistent with several prior CXR in the last few months  · Azithromycin & Ceftriaxone X 1 in the ER.     • Personal history of noncompliance with medical treatment, presenting hazards to health      · Difficulty showing up to clinic visits due to complexity of medical problems and transportation  · Would really benefit from a couple home visits from PCP to help improve compliance     • Anemia due to chronic kidney disease, on chronic dialysis (HCC)      · Nephrology consult in am for dialysis scheduled MWF  · Epoetin (Retacrit) 10,000 units three times a week on dialysis days MWF  · If Hgb less than 7 transfuse PRBCs  · Type & Screen  ·  daily CBC     • MIKE and COPD overlap syndrome (HCC)      · Home Trilogy  · On room air at baseline and here     • CKD (chronic kidney disease), symptom management only, stage 5 ([facility])      Results from last 7 days   Lab Units 12/12/21 2033   CREATININE mg/dL 3.23*     · Baseline creatinine 2-3 GFR 13-25  · GFR 15  · Dialysis MWF, sees Dr. Lauren  · Nephrology consult in am, appreciate recommendations  · Continue Bumex 1mg bid daily  · Holding Bumex 2mg 4 times a week     •  Hypothyroidism      · Levothyroxine 25mcg  Lab Results   Component Value Date    TSH 1.120 04/24/2021 ·          • Uncontrolled type 2 diabetes mellitus with complication, with long-term current use of insulin (HCC)      · Levemir 60 units nightly divided into two 30 units doses to be administered together  · Novolog 40 units tid before meals  · High dose SSI  · States she has an insulin pump at home from Dr. Calvillo but has never been able to come to clinic to have it installed.   · Has a Freestyle payam  · Continue Gabapentin 800mg tid  · Continue Duloxetine 20mg daily  Lab Results   Component Value Date    HGBA1C 8.80 (H) 10/26/2021 ·          • Dyslipidemia      · Continue Atorvastatin 20mg daily  Lab Results   Component Value Date    CHOL 130 06/02/2021    CHLPL 153 11/09/2016    TRIG 338 (H) 06/02/2021    HDL 39 (L) 06/02/2021    LDL 41 06/02/2021 ·          • Essential hypertension      · Elevated BP in ER SBP 160s  · Metoprolol 50mg bid, hold if HR < 60bpm  · Lisinopril 5mg daily  · Telemetry  · Hydralazine po 10mg prn q6h for SBP > 170     • Coronary artery disease      · S/P CABG x 3 (Primary), Bilateral carotid artery stenosis w/ 2 stents on left side,  PAD (peripheral artery disease) with stent in right upper leg, Morbid obesity Body mass index is 52.68 kg/m².  · Continue aspirin, Plavix, atorvastatin, lisinopril,  isosorbide mononitrate 60 mg daily   · EKG: No ST segment changes.   · Troponin elevated chronically 0.032, #2 0.025, trend   · Last cath 11/13/21: Left heart catheterization with iliofemoral arteriogram. Pronto thrombectomy of the left main coronary artery. PTCA and stenting of the distal left main and ostial circumflex artery with deployment of a 3.25 mm x 15 mm Xience Gloria drug-eluting stent. Selective cannulation of the left internal mammary artery. Selective cannulation of the saphenous vein graft to the right coronary artery and a saphenous vein graft to the ramus intermedius branch.  Not a candidate for single vessel CABG. Sees Dr. Rios.   · Ranolazine 500mg bid       • Morbid obesity with BMI of 50.0-59.9, adult (HCC)      · Body mass index is 52.68 kg/m².   · Consistent carb, cardiac, diet           DVT prophylaxis: Heparin  Code Status and Medical Interventions:   Ordered at: 12/12/21 2816     Level Of Support Discussed With:    Patient     Code Status (Patient has no pulse and is not breathing):    CPR (Attempt to Resuscitate)     Medical Interventions (Patient has pulse or is breathing):    Full Support       Plan for disposition:1-2 todays      Time: 20 minutes       Jerman Coffman MD   PGY-2    [facility]  84 [place] [address]  Office: [phone]    This document has been electronically signed by Jerman Coffman MD on December 13, 2021 08:39 CST

## 2021-12-13 NOTE — ED NOTES
Bed flow notified of pt on insulin gtt will required sdu or ccu bed     Kylah New RN  12/13/21 0227

## 2021-12-13 NOTE — CONSULTS
[facility]  [address] [place] [address]. [address]   - [phone]  F - [phone]     Consultation         PATIENT  DEMOGRAPHICS   PATIENT NAME: Yamileth Slater                      PHYSICIAN: BRIDGETT Hadley  : 1960  MRN: [id number]    Subjective   SUBJECTIVE   Referring Provider: Dr. Coffman  Reason for Consultation: ESRD on HD  History of present illness:  This is a 61-year-old female with a past medical history significant for COPD, CKD/ESRD, CAD, DM 2, hypertension, morbid obesity, PVD who presented to the hospital with chest pain. She was had multiple recent admissions with cellulitis, shortness of breath, fluid overload, ERIKA, and was started on HD in a recent admission.  This time she presented to the hospital due to not having electricity at home to use her trilogy.  On evaluation here she was also found to have significantly elevated blood glucose and was admitted for further treatment.  She is currently on insulin drip. Nephrology has been consulted to manage her dialysis needs.    Past Medical History:   Diagnosis Date   • Acute blood loss anemia 2017    Likely due to gastric oozing at this time. - Dr. Duarte (GI) was consulted and has now signed off, will follow up outpatient - pill colonoscopy showed AVMs - continue to monitor   • Anxiety    • Carotid artery stenosis    • Chronic obstructive lung disease (HCC)    • CKD (chronic kidney disease) stage 4, GFR 15-29 ml/min (HCC)    • Colonic polyp    • Coronary arteriosclerosis    • Diabetes mellitus (HCC)    • Diabetic neuropathy (HCC)    • Ear pain, right 10/18/2021    - canal trauma due to patient scratching and DMT2 - added cortisporin ear drops   • Elevated troponin 10/12/2021    -most likely from CKD -Trending down -Neg chest pain   • GERD (gastroesophageal reflux disease)    • GI bleed 2021    - GI will follow up outpatient - Protonix 40mg daily - Avoid medical DVT prophy and use mechanical at  this time instead. - Continue to monitor - pill colonoscopy results showed AVMs   • History of transfusion    • Hypercholesterolemia    • Hypertension    • Hypomagnesemia 6/27/2021    Monitor and replace   • Morbid obesity (HCC)    • Nephrolithiasis    • Peripheral vascular disease (HCC)    • Sleep apnea    • Substance abuse (HCC)    • Vitamin D deficiency      Past Surgical History:   Procedure Laterality Date   • CARDIAC CATHETERIZATION N/A 7/14/2020   • CARDIAC CATHETERIZATION N/A 4/23/2021    Procedure: Left Heart Cath;  Surgeon: Melba Romo MD;  Location: [facility] CATH INVASIVE LOCATION;  Service: Cardiology;  Laterality: N/A;   • CARDIAC CATHETERIZATION N/A 4/30/2021    Procedure: Percutaneous Coronary Intervention;  Surgeon: Russell Voss MD;  Location: [facility] CATH INVASIVE LOCATION;  Service: Cardiovascular;  Laterality: N/A;   • CARDIAC CATHETERIZATION N/A 4/30/2021    Procedure: Stent NIKKI coronary;  Surgeon: Russell Voss MD;  Location: [facility] CATH INVASIVE LOCATION;  Service: Cardiovascular;  Laterality: N/A;   • CARDIAC CATHETERIZATION Left 11/13/2021    Procedure: Left Heart Cath;  Surgeon: Niall Rios MD;  Location: [facility] CATH INVASIVE LOCATION;  Service: Cardiology;  Laterality: Left;   • CAROTID STENT Left    • COLONOSCOPY     • COLONOSCOPY N/A 5/14/2021    Procedure: COLONOSCOPY;  Surgeon: Mingo Duarte MD;  Location: [facility] ENDOSCOPY;  Service: Gastroenterology;  Laterality: N/A;   • CORONARY ARTERY BYPASS GRAFT N/A 2013    CABG X 3   • CYSTOSCOPY BLADDER STONE LITHOTRIPSY Bilateral    • ENDOSCOPY N/A 4/12/2021    Procedure: ESOPHAGOGASTRODUODENOSCOPY;  Surgeon: Mingo Duarte MD;  Location: [facility] ENDOSCOPY;  Service: Gastroenterology;  Laterality: N/A;   • ENDOSCOPY N/A 5/14/2021    Procedure: ESOPHAGOGASTRODUODENOSCOPY;  Surgeon: Mingo Duarte MD;  Location: [facility] ENDOSCOPY;  Service: Gastroenterology;  Laterality: N/A;   • INTERVENTIONAL RADIOLOGY  "PROCEDURE N/A 10/21/2021    Procedure: tunneled central venous catheter placement;  Surgeon: Donnie Robles MD;  Location: [facility] ANGIO INVASIVE LOCATION;  Service: Interventional Radiology;  Laterality: N/A;     Family History   Problem Relation Age of Onset   • Heart disease Mother    • Lung cancer Mother    • Heart disease Father    • Heart attack Father    • Diabetes Father    • Heart disease Half-Sister         Dad's side   • Heart disease Brother    • No Known Problems Sister    • No Known Problems Sister    • No Known Problems Sister    • No Known Problems Sister    • No Known Problems Sister    • Pancreatic cancer Half-Sister         Dad's side   • No Known Problems Brother    • No Known Problems Brother    • Heart attack Half-Brother    • Heart attack Half-Brother    • No Known Problems Maternal Grandmother    • No Known Problems Maternal Grandfather    • No Known Problems Paternal Grandmother    • No Known Problems Paternal Grandfather      Social History     Tobacco Use   • Smoking status: Former Smoker     Packs/day: 0.25     Years: 46.00     Pack years: 11.50     Types: Cigarettes   • Smokeless tobacco: Never Used   • Tobacco comment: only smoking 5 a day - quit april 23 2021   Substance Use Topics   • Alcohol use: No   • Drug use: Not Currently     Types: LSD, Marijuana, Methamphetamines     Allergies:  Adhesive tape and Other     REVIEW OF SYSTEMS    Review of Systems   All other systems reviewed and are negative.      Objective   OBJECTIVE   Vital Signs  Temp:  [98.2 °F (36.8 °C)] 98.2 °F (36.8 °C)  Heart Rate:  [61-81] 70  Resp:  [18-20] 18  BP: (125-193)/(56-92) 155/66    Flowsheet Rows      First Filed Value   Admission Height 157.5 cm (62\") Documented at 12/12/2021 2007   Admission Weight 131 kg (288 lb) Documented at 12/12/2021 2007           I/O last 3 completed shifts:  In: 100 [IV Piggyback:100]  Out: -     PHYSICAL EXAM    Physical Exam  Constitutional:       Appearance: She is " well-developed.   HENT:      Head: Normocephalic and atraumatic.   Eyes:      Conjunctiva/sclera: Conjunctivae normal.      Pupils: Pupils are equal, round, and reactive to light.   Cardiovascular:      Rate and Rhythm: Normal rate and regular rhythm.   Pulmonary:      Effort: Pulmonary effort is normal.      Breath sounds: Normal breath sounds.   Abdominal:      Palpations: Abdomen is soft.   Musculoskeletal:      Cervical back: Neck supple.      Right lower leg: No edema.      Left lower leg: No edema.   Skin:     General: Skin is warm and dry.   Neurological:      Mental Status: She is alert and oriented to person, place, and time.   Psychiatric:         Mood and Affect: Mood normal.         Behavior: Behavior normal.         RESULTS   Results Review:    Results from last 7 days   Lab Units 12/13/21 0242 12/12/21 2033   SODIUM mmol/L 128* 128*   POTASSIUM mmol/L 4.9 4.1   CHLORIDE mmol/L 94* 92*   CO2 mmol/L 22.0 26.0   BUN mg/dL 51* 50*   CREATININE mg/dL 3.23* 3.23*   CALCIUM mg/dL 8.5* 8.7   BILIRUBIN mg/dL 0.2 0.2   ALK PHOS U/L 231* 215*   ALT (SGPT) U/L 11 10   AST (SGOT) U/L 13 14   GLUCOSE mg/dL 516* 502*       Estimated Creatinine Clearance: 23.5 mL/min (A) (by C-G formula based on SCr of 3.23 mg/dL (H)).                Results from last 7 days   Lab Units 12/13/21  0242 12/12/21 2033   WBC 10*3/mm3 8.88 8.69   HEMOGLOBIN g/dL 7.4* 7.7*   PLATELETS 10*3/mm3 192 240              MEDICATIONS    aspirin, 81 mg, Oral, Daily  atorvastatin, 20 mg, Oral, Daily  bumetanide, 1 mg, Oral, BID  cetirizine, 10 mg, Oral, Daily  clopidogrel, 75 mg, Oral, Daily  DULoxetine, 20 mg, Oral, Daily  epoetin hubert-epbx, 10,000 Units, Subcutaneous, Once per day on Mon Wed Fri  ferrous sulfate, 324 mg, Oral, Daily With Breakfast  gabapentin, 800 mg, Oral, Q8H  heparin (porcine), 5,000 Units, Subcutaneous, Q8H  insulin detemir, 30 Units, Subcutaneous, Nightly   And  insulin detemir, 30 Units, Subcutaneous,  Nightly  ipratropium-albuterol, 3 mL, Nebulization, 4x Daily - RT  isosorbide mononitrate, 30 mg, Oral, QAM  levothyroxine, 25 mcg, Oral, Q AM  lisinopril, 5 mg, Oral, Daily  metoprolol tartrate, 50 mg, Oral, Q12H  montelukast, 10 mg, Oral, Nightly  nystatin, , Topical, TID  oxybutynin XL, 5 mg, Oral, Daily  pantoprazole, 40 mg, Oral, Nightly  ranolazine, 500 mg, Oral, Q12H  sennosides-docusate, 2 tablet, Oral, BID  sodium chloride, 10 mL, Intravenous, Q12H      insulin, 1-20 Units/hr, Last Rate: 12 Units/hr (12/13/21 1222)  sodium chloride, 100 mL/hr      (Not in a hospital admission)    Assessment/Plan   ASSESSMENT / PLAN      Chronic respiratory failure with hypoxia (HCC)    Morbid obesity with BMI of 50.0-59.9, adult (HCC)    Essential hypertension    Dyslipidemia    Coronary artery disease    Uncontrolled type 2 diabetes mellitus with complication, with long-term current use of insulin (HCC)    Hypothyroidism    CKD (chronic kidney disease), symptom management only, stage 5 (HCC)    MIKE and COPD overlap syndrome (HCC)    Anemia due to chronic kidney disease, on chronic dialysis (HCC)    Personal history of noncompliance with medical treatment, presenting hazards to health    1.  ESRD on HD- Initiated HD on 10/21 due to hyperkalemia and fluid overload, now likely ESRD.  HD MEF for now.  Next HD today. keep bumex on non dialysis days.      2.  Hyponatremia- Na 128. Modulate with HD     3.  Hyperglycemia-started on insulin drip, moved to ICU     4.  History of CAD     5.  History of COPD - On trilogy at night but had no power at home     6.  Anemia / recent GI bleed / b12 deficiency-  s/p capsule endoscopy which showed few small non-bleeding intestinal AVMs and single small polyp.      7. 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 HTN- Continue home antihypertensives. Low dose lisinopril 5mg daily          I discussed the patients findings and my recommendations with patient      This document has been  electronically signed by BRIDGETT Hadley on December 13, 2021 13:18 CST      For this patient encounter, I have reviewed the Nurse Practitioner's documentation, medical decision making, and treatment plan and personally spent time with the patient.

## 2021-12-14 ENCOUNTER — HOME CARE VISIT (OUTPATIENT)
Dept: HOME HEALTH SERVICES | Facility: HOME HEALTHCARE | Age: 61
End: 2021-12-14

## 2021-12-14 LAB
ALBUMIN SERPL-MCNC: 3.7 G/DL (ref 3.5–5.2)
ALBUMIN/GLOB SERPL: 0.9 G/DL
ALP SERPL-CCNC: 140 U/L (ref 39–117)
ALT SERPL W P-5'-P-CCNC: 9 U/L (ref 1–33)
ANION GAP SERPL CALCULATED.3IONS-SCNC: 10 MMOL/L (ref 5–15)
ANION GAP SERPL CALCULATED.3IONS-SCNC: 13 MMOL/L (ref 5–15)
ANION GAP SERPL CALCULATED.3IONS-SCNC: 13 MMOL/L (ref 5–15)
ANION GAP SERPL CALCULATED.3IONS-SCNC: 16 MMOL/L (ref 5–15)
AST SERPL-CCNC: 9 U/L (ref 1–32)
BASOPHILS # BLD AUTO: 0.04 10*3/MM3 (ref 0–0.2)
BASOPHILS NFR BLD AUTO: 0.3 % (ref 0–1.5)
BILIRUB SERPL-MCNC: 0.2 MG/DL (ref 0–1.2)
BUN SERPL-MCNC: 40 MG/DL (ref 8–23)
BUN SERPL-MCNC: 43 MG/DL (ref 8–23)
BUN SERPL-MCNC: 45 MG/DL (ref 8–23)
BUN SERPL-MCNC: 48 MG/DL (ref 8–23)
BUN/CREAT SERPL: 13.4 (ref 7–25)
BUN/CREAT SERPL: 13.6 (ref 7–25)
BUN/CREAT SERPL: 14.4 (ref 7–25)
BUN/CREAT SERPL: 15 (ref 7–25)
CALCIUM SPEC-SCNC: 8.5 MG/DL (ref 8.6–10.5)
CALCIUM SPEC-SCNC: 8.6 MG/DL (ref 8.6–10.5)
CALCIUM SPEC-SCNC: 8.6 MG/DL (ref 8.6–10.5)
CALCIUM SPEC-SCNC: 8.8 MG/DL (ref 8.6–10.5)
CHLORIDE SERPL-SCNC: 93 MMOL/L (ref 98–107)
CHLORIDE SERPL-SCNC: 93 MMOL/L (ref 98–107)
CHLORIDE SERPL-SCNC: 94 MMOL/L (ref 98–107)
CHLORIDE SERPL-SCNC: 94 MMOL/L (ref 98–107)
CO2 SERPL-SCNC: 22 MMOL/L (ref 22–29)
CO2 SERPL-SCNC: 24 MMOL/L (ref 22–29)
CO2 SERPL-SCNC: 24 MMOL/L (ref 22–29)
CO2 SERPL-SCNC: 26 MMOL/L (ref 22–29)
CREAT SERPL-MCNC: 2.98 MG/DL (ref 0.57–1)
CREAT SERPL-MCNC: 2.98 MG/DL (ref 0.57–1)
CREAT SERPL-MCNC: 3.21 MG/DL (ref 0.57–1)
CREAT SERPL-MCNC: 3.32 MG/DL (ref 0.57–1)
DEPRECATED RDW RBC AUTO: 54.9 FL (ref 37–54)
EOSINOPHIL # BLD AUTO: 0.04 10*3/MM3 (ref 0–0.4)
EOSINOPHIL NFR BLD AUTO: 0.3 % (ref 0.3–6.2)
ERYTHROCYTE [DISTWIDTH] IN BLOOD BY AUTOMATED COUNT: 17.4 % (ref 12.3–15.4)
GFR SERPL CREATININE-BSD FRML MDRD: 14 ML/MIN/1.73
GFR SERPL CREATININE-BSD FRML MDRD: 15 ML/MIN/1.73
GFR SERPL CREATININE-BSD FRML MDRD: 16 ML/MIN/1.73
GFR SERPL CREATININE-BSD FRML MDRD: 16 ML/MIN/1.73
GFR SERPL CREATININE-BSD FRML MDRD: ABNORMAL ML/MIN/{1.73_M2}
GLOBULIN UR ELPH-MCNC: 4 GM/DL
GLUCOSE BLDC GLUCOMTR-MCNC: 111 MG/DL (ref 70–130)
GLUCOSE BLDC GLUCOMTR-MCNC: 133 MG/DL (ref 70–130)
GLUCOSE BLDC GLUCOMTR-MCNC: 191 MG/DL (ref 70–130)
GLUCOSE BLDC GLUCOMTR-MCNC: 202 MG/DL (ref 70–130)
GLUCOSE BLDC GLUCOMTR-MCNC: 203 MG/DL (ref 70–130)
GLUCOSE BLDC GLUCOMTR-MCNC: 225 MG/DL (ref 70–130)
GLUCOSE BLDC GLUCOMTR-MCNC: 263 MG/DL (ref 70–130)
GLUCOSE BLDC GLUCOMTR-MCNC: 297 MG/DL (ref 70–130)
GLUCOSE BLDC GLUCOMTR-MCNC: 426 MG/DL (ref 70–130)
GLUCOSE BLDC GLUCOMTR-MCNC: 503 MG/DL (ref 70–130)
GLUCOSE BLDC GLUCOMTR-MCNC: 574 MG/DL (ref 70–130)
GLUCOSE BLDC GLUCOMTR-MCNC: 78 MG/DL (ref 70–130)
GLUCOSE BLDC GLUCOMTR-MCNC: 81 MG/DL (ref 70–130)
GLUCOSE BLDC GLUCOMTR-MCNC: 86 MG/DL (ref 70–130)
GLUCOSE BLDC GLUCOMTR-MCNC: 92 MG/DL (ref 70–130)
GLUCOSE BLDC GLUCOMTR-MCNC: 92 MG/DL (ref 70–130)
GLUCOSE SERPL-MCNC: 181 MG/DL (ref 65–99)
GLUCOSE SERPL-MCNC: 197 MG/DL (ref 65–99)
GLUCOSE SERPL-MCNC: 201 MG/DL (ref 65–99)
GLUCOSE SERPL-MCNC: 79 MG/DL (ref 65–99)
HCT VFR BLD AUTO: 24.4 % (ref 34–46.6)
HGB BLD-MCNC: 7.9 G/DL (ref 12–15.9)
IMM GRANULOCYTES # BLD AUTO: 0.16 10*3/MM3 (ref 0–0.05)
IMM GRANULOCYTES NFR BLD AUTO: 1.2 % (ref 0–0.5)
LYMPHOCYTES # BLD AUTO: 2.73 10*3/MM3 (ref 0.7–3.1)
LYMPHOCYTES NFR BLD AUTO: 20.6 % (ref 19.6–45.3)
MCH RBC QN AUTO: 28.2 PG (ref 26.6–33)
MCHC RBC AUTO-ENTMCNC: 32.4 G/DL (ref 31.5–35.7)
MCV RBC AUTO: 87.1 FL (ref 79–97)
MONOCYTES # BLD AUTO: 1.2 10*3/MM3 (ref 0.1–0.9)
MONOCYTES NFR BLD AUTO: 9.1 % (ref 5–12)
NEUTROPHILS NFR BLD AUTO: 68.5 % (ref 42.7–76)
NEUTROPHILS NFR BLD AUTO: 9.08 10*3/MM3 (ref 1.7–7)
NRBC BLD AUTO-RTO: 0.2 /100 WBC (ref 0–0.2)
PLATELET # BLD AUTO: 199 10*3/MM3 (ref 140–450)
PMV BLD AUTO: 9.2 FL (ref 6–12)
POTASSIUM SERPL-SCNC: 3.7 MMOL/L (ref 3.5–5.2)
POTASSIUM SERPL-SCNC: 3.9 MMOL/L (ref 3.5–5.2)
POTASSIUM SERPL-SCNC: 4.3 MMOL/L (ref 3.5–5.2)
POTASSIUM SERPL-SCNC: 4.4 MMOL/L (ref 3.5–5.2)
PROT SERPL-MCNC: 7.7 G/DL (ref 6–8.5)
RBC # BLD AUTO: 2.8 10*6/MM3 (ref 3.77–5.28)
SODIUM SERPL-SCNC: 130 MMOL/L (ref 136–145)
SODIUM SERPL-SCNC: 130 MMOL/L (ref 136–145)
SODIUM SERPL-SCNC: 131 MMOL/L (ref 136–145)
SODIUM SERPL-SCNC: 131 MMOL/L (ref 136–145)
WBC NRBC COR # BLD: 13.25 10*3/MM3 (ref 3.4–10.8)
WHOLE BLOOD HOLD SPECIMEN: NORMAL

## 2021-12-14 PROCEDURE — 36415 COLL VENOUS BLD VENIPUNCTURE: CPT | Performed by: STUDENT IN AN ORGANIZED HEALTH CARE EDUCATION/TRAINING PROGRAM

## 2021-12-14 PROCEDURE — 63710000001 INSULIN ASPART PER 5 UNITS: Performed by: STUDENT IN AN ORGANIZED HEALTH CARE EDUCATION/TRAINING PROGRAM

## 2021-12-14 PROCEDURE — 0 INSULIN REGULAR HUMAN PER 5 UNITS: Performed by: STUDENT IN AN ORGANIZED HEALTH CARE EDUCATION/TRAINING PROGRAM

## 2021-12-14 PROCEDURE — 63710000001 INSULIN DETEMIR PER 5 UNITS: Performed by: STUDENT IN AN ORGANIZED HEALTH CARE EDUCATION/TRAINING PROGRAM

## 2021-12-14 PROCEDURE — 25010000002 HEPARIN (PORCINE) PER 1000 UNITS: Performed by: STUDENT IN AN ORGANIZED HEALTH CARE EDUCATION/TRAINING PROGRAM

## 2021-12-14 PROCEDURE — 99231 SBSQ HOSP IP/OBS SF/LOW 25: CPT | Performed by: STUDENT IN AN ORGANIZED HEALTH CARE EDUCATION/TRAINING PROGRAM

## 2021-12-14 PROCEDURE — 82962 GLUCOSE BLOOD TEST: CPT

## 2021-12-14 PROCEDURE — 80053 COMPREHEN METABOLIC PANEL: CPT | Performed by: STUDENT IN AN ORGANIZED HEALTH CARE EDUCATION/TRAINING PROGRAM

## 2021-12-14 PROCEDURE — 85025 COMPLETE CBC W/AUTO DIFF WBC: CPT | Performed by: STUDENT IN AN ORGANIZED HEALTH CARE EDUCATION/TRAINING PROGRAM

## 2021-12-14 RX ORDER — ALBUMIN (HUMAN) 12.5 G/50ML
12.5 SOLUTION INTRAVENOUS AS NEEDED
Status: DISCONTINUED | OUTPATIENT
Start: 2021-12-15 | End: 2021-12-15 | Stop reason: HOSPADM

## 2021-12-14 RX ORDER — NICOTINE POLACRILEX 4 MG
15 LOZENGE BUCCAL
Status: DISCONTINUED | OUTPATIENT
Start: 2021-12-14 | End: 2021-12-15 | Stop reason: HOSPADM

## 2021-12-14 RX ORDER — DEXTROSE MONOHYDRATE 25 G/50ML
25 INJECTION, SOLUTION INTRAVENOUS
Status: DISCONTINUED | OUTPATIENT
Start: 2021-12-14 | End: 2021-12-15 | Stop reason: HOSPADM

## 2021-12-14 RX ORDER — BUMETANIDE 1 MG/1
2 TABLET ORAL
Status: DISCONTINUED | OUTPATIENT
Start: 2021-12-16 | End: 2021-12-15 | Stop reason: HOSPADM

## 2021-12-14 RX ORDER — HEPARIN SODIUM 1000 [USP'U]/ML
2000 INJECTION, SOLUTION INTRAVENOUS; SUBCUTANEOUS AS NEEDED
Status: DISCONTINUED | OUTPATIENT
Start: 2021-12-15 | End: 2021-12-15 | Stop reason: HOSPADM

## 2021-12-14 RX ADMIN — OXYBUTYNIN CHLORIDE 5 MG: 5 TABLET, EXTENDED RELEASE ORAL at 08:37

## 2021-12-14 RX ADMIN — ATORVASTATIN CALCIUM 20 MG: 20 TABLET, FILM COATED ORAL at 08:38

## 2021-12-14 RX ADMIN — PANTOPRAZOLE SODIUM 40 MG: 40 TABLET, DELAYED RELEASE ORAL at 19:54

## 2021-12-14 RX ADMIN — HEPARIN SODIUM 5000 UNITS: 5000 INJECTION INTRAVENOUS; SUBCUTANEOUS at 06:10

## 2021-12-14 RX ADMIN — BUMETANIDE 1 MG: 1 TABLET ORAL at 08:38

## 2021-12-14 RX ADMIN — FERROUS SULFATE TAB EC 324 MG (65 MG FE EQUIVALENT) 324 MG: 324 (65 FE) TABLET DELAYED RESPONSE at 08:37

## 2021-12-14 RX ADMIN — ASPIRIN 81 MG: 81 TABLET, FILM COATED ORAL at 08:38

## 2021-12-14 RX ADMIN — RANOLAZINE 500 MG: 500 TABLET, FILM COATED, EXTENDED RELEASE ORAL at 08:37

## 2021-12-14 RX ADMIN — INSULIN ASPART 30 UNITS: 100 INJECTION, SOLUTION INTRAVENOUS; SUBCUTANEOUS at 07:45

## 2021-12-14 RX ADMIN — INSULIN DETEMIR 30 UNITS: 100 INJECTION, SOLUTION SUBCUTANEOUS at 21:46

## 2021-12-14 RX ADMIN — DULOXETINE HYDROCHLORIDE 20 MG: 20 CAPSULE, DELAYED RELEASE ORAL at 08:38

## 2021-12-14 RX ADMIN — GABAPENTIN 800 MG: 400 CAPSULE ORAL at 21:46

## 2021-12-14 RX ADMIN — HEPARIN SODIUM 5000 UNITS: 5000 INJECTION INTRAVENOUS; SUBCUTANEOUS at 21:46

## 2021-12-14 RX ADMIN — METOPROLOL TARTRATE 50 MG: 50 TABLET, FILM COATED ORAL at 19:54

## 2021-12-14 RX ADMIN — GABAPENTIN 800 MG: 400 CAPSULE ORAL at 13:39

## 2021-12-14 RX ADMIN — ISOSORBIDE MONONITRATE 30 MG: 30 TABLET, EXTENDED RELEASE ORAL at 06:10

## 2021-12-14 RX ADMIN — LISINOPRIL 5 MG: 5 TABLET ORAL at 08:38

## 2021-12-14 RX ADMIN — MONTELUKAST 10 MG: 10 TABLET, FILM COATED ORAL at 19:54

## 2021-12-14 RX ADMIN — SODIUM CHLORIDE, PRESERVATIVE FREE 10 ML: 5 INJECTION INTRAVENOUS at 20:25

## 2021-12-14 RX ADMIN — CLOPIDOGREL BISULFATE 75 MG: 75 TABLET ORAL at 08:37

## 2021-12-14 RX ADMIN — GABAPENTIN 800 MG: 400 CAPSULE ORAL at 06:10

## 2021-12-14 RX ADMIN — RANOLAZINE 500 MG: 500 TABLET, FILM COATED, EXTENDED RELEASE ORAL at 19:54

## 2021-12-14 RX ADMIN — HEPARIN SODIUM 5000 UNITS: 5000 INJECTION INTRAVENOUS; SUBCUTANEOUS at 13:38

## 2021-12-14 RX ADMIN — INSULIN ASPART 30 UNITS: 100 INJECTION, SOLUTION INTRAVENOUS; SUBCUTANEOUS at 12:05

## 2021-12-14 RX ADMIN — NYSTATIN: 100000 POWDER TOPICAL at 08:40

## 2021-12-14 RX ADMIN — CETIRIZINE HYDROCHLORIDE 10 MG: 10 TABLET, FILM COATED ORAL at 08:39

## 2021-12-14 RX ADMIN — SODIUM CHLORIDE, PRESERVATIVE FREE 10 ML: 5 INJECTION INTRAVENOUS at 08:41

## 2021-12-14 RX ADMIN — SODIUM CHLORIDE 19 UNITS/HR: 9 INJECTION, SOLUTION INTRAVENOUS at 00:08

## 2021-12-14 RX ADMIN — LEVOTHYROXINE SODIUM 25 MCG: 25 TABLET ORAL at 06:10

## 2021-12-14 NOTE — CASE COMMUNICATION
Pt missed a physical therapy appt on 12/13/21 due to hospitalization on 12/12/21 at [facility].
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
## 2021-12-14 NOTE — PROGRESS NOTES
"[facility]  [address] [place] [address]. [address]  T - [phone]  F - [phone]     Progress Note          PATIENT  DEMOGRAPHICS   PATIENT NAME: Yamileth Slater                      PHYSICIAN: BRIDGETT Hadley  : 1960  MRN: [id number]   LOS: 1 day    Patient Care Team:  Rianna Macias MD as PCP - General (Family Medicine)  Subjective   SUBJECTIVE   Comfortable, no marked dyspnea.       Objective   OBJECTIVE   Vital Signs  Temp:  [97 °F (36.1 °C)-98.6 °F (37 °C)] 97.9 °F (36.6 °C)  Heart Rate:  [52-75] 64  Resp:  [17-22] 18  BP: (103-161)/(53-91) 128/91    Flowsheet Rows      First Filed Value   Admission Height 157.5 cm (62\") Documented at 2021   Admission Weight 131 kg (288 lb) Documented at 2021           I/O last 3 completed shifts:  In: 924 [P.O.:560; I.V.:264; IV Piggyback:100]  Out: 5500 [Urine:1500; Other:4000]    PHYSICAL EXAM    Physical Exam  Constitutional:       Appearance: She is well-developed.   HENT:      Head: Normocephalic.   Eyes:      Pupils: Pupils are equal, round, and reactive to light.   Cardiovascular:      Rate and Rhythm: Normal rate and regular rhythm.      Heart sounds: Normal heart sounds.   Pulmonary:      Effort: Pulmonary effort is normal.      Breath sounds: Normal breath sounds.   Abdominal:      General: Bowel sounds are normal.      Palpations: Abdomen is soft.   Musculoskeletal:         General: No swelling.   Skin:     Coloration: Skin is not jaundiced.   Neurological:      General: No focal deficit present.      Mental Status: She is alert and oriented to person, place, and time.         RESULTS   Results Review:    Results from last 7 days   Lab Units 21  1323 21  0916 21  0410 21  1258 21  0242 21   SODIUM mmol/L 130* 131* 130*   < > 128*   < > 128*   POTASSIUM mmol/L 3.9 4.3 4.4   < > 4.9   < > 4.1   CHLORIDE mmol/L 93* 94* 94*   < > 94*   < > " 92*   CO2 mmol/L 24.0 24.0 26.0   < > 22.0   < > 26.0   BUN mg/dL 48* 45* 43*   < > 51*   < > 50*   CREATININE mg/dL 3.21* 3.32* 2.98*   < > 3.23*   < > 3.23*   CALCIUM mg/dL 8.6 8.5* 8.8   < > 8.5*   < > 8.7   BILIRUBIN mg/dL  --   --  0.2  --  0.2  --  0.2   ALK PHOS U/L  --   --  140*  --  231*  --  215*   ALT (SGPT) U/L  --   --  9  --  11  --  10   AST (SGOT) U/L  --   --  9  --  13  --  14   GLUCOSE mg/dL 181* 197* 79   < > 516*   < > 502*    < > = values in this interval not displayed.       Estimated Creatinine Clearance: 24 mL/min (A) (by C-G formula based on SCr of 3.21 mg/dL (H)).                Results from last 7 days   Lab Units 12/14/21  0410 12/13/21  0242 12/12/21  2033   WBC 10*3/mm3 13.25* 8.88 8.69   HEMOGLOBIN g/dL 7.9* 7.4* 7.7*   PLATELETS 10*3/mm3 199 192 240               Imaging Results (Last 24 Hours)     ** No results found for the last 24 hours. **           MEDICATIONS    aspirin, 81 mg, Oral, Daily  atorvastatin, 20 mg, Oral, Daily  bumetanide, 1 mg, Oral, BID  cetirizine, 10 mg, Oral, Daily  clopidogrel, 75 mg, Oral, Daily  DULoxetine, 20 mg, Oral, Daily  epoetin hubert-epbx, 10,000 Units, Subcutaneous, Once per day on Mon Wed Fri  ferrous sulfate, 324 mg, Oral, Daily With Breakfast  gabapentin, 800 mg, Oral, Q8H  heparin (porcine), 5,000 Units, Subcutaneous, Q8H  insulin aspart, 0-24 Units, Subcutaneous, TID AC  insulin aspart, 40 Units, Subcutaneous, TID AC  insulin detemir, 30 Units, Subcutaneous, Nightly   And  insulin detemir, 30 Units, Subcutaneous, Nightly  ipratropium-albuterol, 3 mL, Nebulization, 4x Daily - RT  isosorbide mononitrate, 30 mg, Oral, QAM  levothyroxine, 25 mcg, Oral, Q AM  lisinopril, 5 mg, Oral, Daily  metoprolol tartrate, 50 mg, Oral, Q12H  montelukast, 10 mg, Oral, Nightly  nystatin, , Topical, TID  oxybutynin XL, 5 mg, Oral, Daily  pantoprazole, 40 mg, Oral, Nightly  ranolazine, 500 mg, Oral, Q12H  sennosides-docusate, 2 tablet, Oral, BID  sodium chloride, 10  mL, Intravenous, Q12H           Assessment/Plan   ASSESSMENT / PLAN      Uncontrolled type 2 diabetes mellitus with complication, with long-term current use of insulin (HCC)    Essential hypertension    Dyslipidemia    Coronary artery disease    Hypothyroidism    Chronic respiratory failure with hypoxia (HCC)    CKD (chronic kidney disease), symptom management only, stage 5 (HCC)    MIKE and COPD overlap syndrome (HCC)    Anemia due to chronic kidney disease, on chronic dialysis (HCC)    Personal history of noncompliance with medical treatment, presenting hazards to health    Dyspnea       1.  ESRD on HD- Initiated HD on 10/21 due to hyperkalemia and fluid overload, now likely ESRD.  HD MWF for now. Keep bumex 2 mg on non dialysis days and can continue HD MWF at the time of discharge.      2.  Hyponatremia- Na stable close to 130     3.  Hyperglycemia- off insulin gtt     4.  History of CAD     5.  History of COPD - On trilogy at night but had no power at home     6.  Anemia / recent GI bleed / b12 deficiency-  s/p capsule endoscopy which showed few small non-bleeding intestinal AVMs and single small polyp.      7. 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 HTN- Continue home antihypertensives. Low dose lisinopril 5mg daily            This document has been electronically signed by BRIDGETT Hadley on December 14, 2021 13:49 CST      For this patient encounter, I have reviewed the Nurse Practitioner's documentation, medical decision making, and treatment plan and personally spent time with the patient.

## 2021-12-14 NOTE — PLAN OF CARE
Goal Outcome Evaluation:  Plan of Care Reviewed With: patient        Progress: improving  Outcome Summary: insulin gett continued through the night and titrate per order. drip discontinued per order at 6am, orders for subQ insulin placed. Last few hourly blood sugars were under 100. No complaints of pain. Patient slept with her personal trilogy oxygen system with no difficulties. Bradycardic otherwise VSS. WIll continue to monitor.

## 2021-12-14 NOTE — PLAN OF CARE
Goal Outcome Evaluation:  Plan of Care Reviewed With: caregiver, patient        Progress: no change  Outcome Summary: Pt known to our services.  Hx of DM, CHF, and ESRD.  Admitted for REsp distress with no power to run her triology.  Education provided.  Will monitor

## 2021-12-14 NOTE — PLAN OF CARE
Goal Outcome Evaluation:   Patient arrived on unit this shift. VSS. Dialysis done today, 4L removed. Urine output adequate. Insuliln gtt running per order. No complaints at this time. Continuing to monitor.

## 2021-12-14 NOTE — NURSING NOTE
SBAR report called to KENTRELL Lopez on [address]. Pt transferred to room 353 via transporter after Telemetry placed and verified with Tele Tech to be operating properly. Pt transferred with all her belongings to include: patients own CPAP (trilogy), cell phone, clothing, shoes, , and jewelry she was wearing on her left wrist at this time.

## 2021-12-14 NOTE — PLAN OF CARE
Problem: Fluid Volume Excess (Chronic Kidney Disease)  Goal: Fluid Balance  Outcome: Ongoing, Progressing  Intervention: Monitor and Manage Hypervolemia  Flowsheets (Taken 12/14/2021 1350)  Fluid/Electrolyte Management: (1500) fluids restricted  Skin Protection: tubing/devices free from skin contact     Problem: Adult Inpatient Plan of Care  Goal: Plan of Care Review  Outcome: Ongoing, Progressing  Flowsheets (Taken 12/14/2021 1447)  Progress: improving  Plan of Care Reviewed With: patient  Outcome Summary: Educated pt of fluid restriction   Goal Outcome Evaluation:  Plan of Care Reviewed With: patient        Progress: improving  Outcome Summary: Educated pt of fluid restriction

## 2021-12-14 NOTE — PROGRESS NOTES
FAMILY MEDICINE DAILY PROGRESS NOTE  NAME: Yamileth Slater  : 1960  MRN: [id number]     LOS: 1 day     PROVIDER OF SERVICE: Jerman Coffman MD    Chief Complaint: Uncontrolled type 2 diabetes mellitus with complication, with long-term current use of insulin ([facility])    Subjective:     Interval History:  History taken from: patient chart RN    Patient no acute events overnight.  She was using her CPAP this morning.  Patient still on room air otherwise.  She went to dialysis yesterday.  She still has hyponatremia this morning.  Creatinine was 2.98 this morning.  Patient had a slight bump in her white blood cell count from 8.8-13.2.  Hemoglobin up from 7.4-7.9.  No complaints of pain other than chronic low back pain this morning.  Patient was on insulin drip yesterday.  Blood sugar this morning was down to 79.  Insulin drip stopped.  Restarting patient on subcu insulin.  Social work on board for this patient.    Review of Systems:   Review of Systems   Constitutional: Negative for chills, fatigue and fever.   HENT: Negative for congestion and rhinorrhea.    Respiratory: Negative for cough and shortness of breath.    Cardiovascular: Negative for chest pain, palpitations and leg swelling.   Gastrointestinal: Negative for nausea and vomiting.   Genitourinary: Negative for difficulty urinating, dysuria and hematuria.   Musculoskeletal: Negative for arthralgias and back pain.   Neurological: Negative for dizziness, syncope, weakness, light-headedness and headaches.       Objective:     Vital Signs  Temp:  [97 °F (36.1 °C)-98.6 °F (37 °C)] 97 °F (36.1 °C)  Heart Rate:  [54-75] 54  Resp:  [18-22] 20  BP: (103-161)/(53-81) 146/66    Physical Exam  Physical Exam  Vitals reviewed.   Constitutional:       Appearance: She is well-developed. She is obese. She is not ill-appearing or diaphoretic.   HENT:      Head: Normocephalic and atraumatic.   Eyes:      General: No scleral icterus.     Conjunctiva/sclera: Conjunctivae  normal.   Cardiovascular:      Rate and Rhythm: Normal rate and regular rhythm.      Heart sounds: No murmur heard.      Pulmonary:      Effort: Pulmonary effort is normal. No respiratory distress.      Breath sounds: Normal breath sounds.   Abdominal:      General: Bowel sounds are normal. There is no distension.      Palpations: Abdomen is soft.      Tenderness: There is no abdominal tenderness. Negative signs include Frazier's sign.   Musculoskeletal:         General: No deformity.      Right lower leg: No edema.      Left lower leg: No edema.   Skin:     General: Skin is warm and dry.   Neurological:      General: No focal deficit present.      Mental Status: She is alert. Mental status is at baseline.   Psychiatric:         Mood and Affect: Mood normal.         Behavior: Behavior normal.         Medication Review    Current Facility-Administered Medications:   •  albumin human 25 % IV SOLN 12.5 g, 12.5 g, Intravenous, PRN, Ace Lauren MD  •  albuterol (PROVENTIL) nebulizer solution 0.083% 2.5 mg/3mL, 2.5 mg, Nebulization, Q4H PRN, Haily Mcneil MD  •  aspirin EC tablet 81 mg, 81 mg, Oral, Daily, Haily Mcneil MD, 81 mg at 12/13/21 0823  •  atorvastatin (LIPITOR) tablet 20 mg, 20 mg, Oral, Daily, Haily Mcneil MD, 20 mg at 12/13/21 0823  •  bumetanide (BUMEX) tablet 1 mg, 1 mg, Oral, BID, Haily Mcneil MD, 1 mg at 12/13/21 2019  •  cetirizine (zyrTEC) tablet 10 mg, 10 mg, Oral, Daily, Haily Mcneil MD, 10 mg at 12/13/21 0823  •  clopidogrel (PLAVIX) tablet 75 mg, 75 mg, Oral, Daily, Haily Mcneil MD, 75 mg at 12/13/21 0823  •  cyclobenzaprine (FLEXERIL) tablet 10 mg, 10 mg, Oral, BID PRN, Haily Mcneil MD, 10 mg at 12/13/21 2026  •  dextrose (D50W) (25 g/50 mL) IV injection 25 g, 25 g, Intravenous, Q15 Min PRN, Haily Mcneil MD  •  dextrose (D50W) (25 g/50 mL) IV injection 25-50 mL, 25-50 mL, Intravenous, Q30 Min PRN, Jerman Coffman MD  •  dextrose (GLUTOSE) oral gel 15 g, 15 g, Oral, Q15 Min PRN,  Haily Mcneil MD  •  DULoxetine (CYMBALTA) DR capsule 20 mg, 20 mg, Oral, Daily, Haily Mcneil MD, 20 mg at 12/13/21 0823  •  epoetin hubert-epbx (RETACRIT) injection 10,000 Units, 10,000 Units, Subcutaneous, Once per day on Mon Wed Fri, Haily Mcneil MD, 10,000 Units at 12/13/21 1736  •  ferrous sulfate EC tablet 324 mg, 324 mg, Oral, Daily With Breakfast, Haily Mcneil MD, 324 mg at 12/13/21 0823  •  gabapentin (NEURONTIN) capsule 800 mg, 800 mg, Oral, Q8H, Haily Mcneil MD, 800 mg at 12/14/21 0610  •  glucagon (human recombinant) (GLUCAGEN DIAGNOSTIC) injection 1 mg, 1 mg, Subcutaneous, Q15 Min PRN, Haily Mcneil MD  •  heparin (porcine) 5000 UNIT/ML injection 5,000 Units, 5,000 Units, Subcutaneous, Q8H, Haily Mcneil MD, 5,000 Units at 12/14/21 0610  •  heparin (porcine) injection 2,000 Units, 2,000 Units, Intracatheter, PRN, Ace Lauren MD, 4,000 Units at 12/13/21 1738  •  hydrALAZINE (APRESOLINE) tablet 10 mg, 10 mg, Oral, Q6H PRN, Haily Mcneil MD  •  insulin aspart (novoLOG) injection 30 Units, 30 Units, Subcutaneous, TID AC, Jerman Coffman MD, 30 Units at 12/14/21 0745  •  insulin detemir (LEVEMIR) injection 30 Units, 30 Units, Subcutaneous, Nightly, 30 Units at 12/13/21 2123 **AND** insulin detemir (LEVEMIR) injection 30 Units, 30 Units, Subcutaneous, Nightly, Haily Mcneil MD, 30 Units at 12/13/21 2124  •  ipratropium-albuterol (DUO-NEB) nebulizer solution 3 mL, 3 mL, Nebulization, 4x Daily - RT, Haily Mcneil MD, 3 mL at 12/13/21 1044  •  isosorbide mononitrate (IMDUR) 24 hr tablet 30 mg, 30 mg, Oral, QAM, Haily Mcneil MD, 30 mg at 12/14/21 0610  •  levothyroxine (SYNTHROID, LEVOTHROID) tablet 25 mcg, 25 mcg, Oral, Q AM, Haily Mcneil MD, 25 mcg at 12/14/21 0610  •  lisinopril (PRINIVIL,ZESTRIL) tablet 5 mg, 5 mg, Oral, Daily, Haily Mcneil MD, 5 mg at 12/13/21 0824  •  metoprolol tartrate (LOPRESSOR) tablet 50 mg, 50 mg, Oral, Q12H, Haily Mcneil MD, 50 mg at 12/13/21 2019  •   montelukast (SINGULAIR) tablet 10 mg, 10 mg, Oral, Nightly, Haily Mcneil MD, 10 mg at 12/13/21 2019  •  nystatin (MYCOSTATIN) powder, , Topical, TID, Haily Mcneil MD, Given at 12/13/21 2022  •  ondansetron ODT (ZOFRAN-ODT) disintegrating tablet 4 mg, 4 mg, Oral, Q8H PRN, Haily Mcneil MD  •  oxybutynin XL (DITROPAN-XL) 24 hr tablet 5 mg, 5 mg, Oral, Daily, Haily Mcneil MD, 5 mg at 12/13/21 0824  •  pantoprazole (PROTONIX) EC tablet 40 mg, 40 mg, Oral, Nightly, Haily Mcneil MD, 40 mg at 12/13/21 2021  •  ranolazine (RANEXA) 12 hr tablet 500 mg, 500 mg, Oral, Q12H, Haily Mcneil MD, 500 mg at 12/13/21 2026  •  sennosides-docusate (PERICOLACE) 8.6-50 MG per tablet 2 tablet, 2 tablet, Oral, BID, Haily Mcneil MD, 2 tablet at 12/13/21 0824  •  [COMPLETED] Insert peripheral IV, , , Once **AND** sodium chloride 0.9 % flush 10 mL, 10 mL, Intravenous, PRN, Haily Mcneil MD  •  sodium chloride 0.9 % flush 10 mL, 10 mL, Intravenous, Q12H, Haily Mcneil MD, 10 mL at 12/13/21 2018  •  sodium chloride 0.9 % flush 10 mL, 10 mL, Intravenous, PRN, Haily Mcneil MD     Diagnostic Data    Lab Results (last 24 hours)     Procedure Component Value Units Date/Time    POC Glucose Once [[id number]]  (Normal) Collected: 12/14/21 0612    Specimen: Blood Updated: 12/14/21 0631     Glucose 92 mg/dL      Comment: : [id number] [facility] [id number]       POC Glucose Once [[id number]]  (Normal) Collected: 12/14/21 0530    Specimen: Blood Updated: 12/14/21 0631     Glucose 92 mg/dL      Comment: : [id number] [facility]: [id number]       Comprehensive Metabolic Panel [[id number]]  (Abnormal) Collected: 12/14/21 0410    Specimen: Blood Updated: 12/14/21 0455     Glucose 79 mg/dL      BUN 43 mg/dL      Creatinine 2.98 mg/dL      Sodium 130 mmol/L      Potassium 4.4 mmol/L      Chloride 94 mmol/L      CO2 26.0 mmol/L      Calcium 8.8 mg/dL      Total Protein 7.7 g/dL      Albumin 3.70 g/dL       ALT (SGPT) 9 U/L      AST (SGOT) 9 U/L      Alkaline Phosphatase 140 U/L      Total Bilirubin 0.2 mg/dL      eGFR Non African Amer 16 mL/min/1.73      Globulin 4.0 gm/dL      A/G Ratio 0.9 g/dL      BUN/Creatinine Ratio 14.4     Anion Gap 10.0 mmol/L     Narrative:      GFR Normal >60  Chronic Kidney Disease <60  Kidney Failure <15      POC Glucose Once [[id number]]  (Normal) Collected: 12/14/21 0410    Specimen: Blood Updated: 12/14/21 0437     Glucose 81 mg/dL      Comment: : [id number] [facility] ID: [id number]       POC Glucose Once [[id number]]  (Normal) Collected: 12/14/21 0319    Specimen: Blood Updated: 12/14/21 0437     Glucose 86 mg/dL      Comment: : [id number] [facility] ID: [id number]       POC Glucose Once [[id number]]  (Normal) Collected: 12/14/21 0217    Specimen: Blood Updated: 12/14/21 0436     Glucose 111 mg/dL      Comment: : [id number] [facility] ID: [id number]       CBC Auto Differential [[id number]]  (Abnormal) Collected: 12/14/21 0410    Specimen: Blood Updated: 12/14/21 0426     WBC 13.25 10*3/mm3      RBC 2.80 10*6/mm3      Hemoglobin 7.9 g/dL      Hematocrit 24.4 %      MCV 87.1 fL      MCH 28.2 pg      MCHC 32.4 g/dL      RDW 17.4 %      RDW-SD 54.9 fl      MPV 9.2 fL      Platelets 199 10*3/mm3      Neutrophil % 68.5 %      Lymphocyte % 20.6 %      Monocyte % 9.1 %      Eosinophil % 0.3 %      Basophil % 0.3 %      Immature Grans % 1.2 %      Neutrophils, Absolute 9.08 10*3/mm3      Lymphocytes, Absolute 2.73 10*3/mm3      Monocytes, Absolute 1.20 10*3/mm3      Eosinophils, Absolute 0.04 10*3/mm3      Basophils, Absolute 0.04 10*3/mm3      Immature Grans, Absolute 0.16 10*3/mm3      nRBC 0.2 /100 WBC     Basic Metabolic Panel [[id number]]  (Abnormal) Collected: 12/14/21 0007    Specimen: Blood Updated: 12/14/21 0038     Glucose 201 mg/dL      BUN 40 mg/dL      Creatinine 2.98 mg/dL      Sodium 131 mmol/L      Potassium 3.7 mmol/L       Chloride 93 mmol/L      CO2 22.0 mmol/L      Calcium 8.6 mg/dL      eGFR Non African Amer 16 mL/min/1.73      BUN/Creatinine Ratio 13.4     Anion Gap 16.0 mmol/L     Narrative:      GFR Normal >60  Chronic Kidney Disease <60  Kidney Failure <15      POC Glucose Once [[id number]]  (Abnormal) Collected: 12/13/21 2227    Specimen: Blood Updated: 12/13/21 2301     Glucose 277 mg/dL      Comment: : [id number] [facility] ID: [id number]       Blood Culture - Blood, Arm, Right [[id number]]  (Normal) Collected: 12/12/21 2219    Specimen: Blood from Arm, Right Updated: 12/13/21 2230     Blood Culture No growth at 24 hours    Blood Culture - Blood, Wrist, Right [[id number]]  (Normal) Collected: 12/12/21 2136    Specimen: Blood from Wrist, Right Updated: 12/13/21 2145     Blood Culture No growth at 24 hours    Basic Metabolic Panel [[id number]]  (Abnormal) Collected: 12/13/21 2007    Specimen: Blood Updated: 12/13/21 2051     Glucose 241 mg/dL      BUN 35 mg/dL      Creatinine 2.73 mg/dL      Sodium 126 mmol/L      Potassium 3.8 mmol/L      Chloride 89 mmol/L      CO2 22.0 mmol/L      Calcium 8.6 mg/dL      eGFR Non African Amer 18 mL/min/1.73      BUN/Creatinine Ratio 12.8     Anion Gap 15.0 mmol/L     Narrative:      GFR Normal >60  Chronic Kidney Disease <60  Kidney Failure <15      POC Glucose Once [[id number]]  (Abnormal) Collected: 12/13/21 1939    Specimen: Blood Updated: 12/13/21 1952     Glucose 244 mg/dL      Comment: : [id number] [facility] ID: [id number]       POC Glucose Once [[id number]]  (Abnormal) Collected: 12/13/21 1803    Specimen: Blood Updated: 12/13/21 1951     Glucose 227 mg/dL      Comment: RN NotifiedOperator: [id number] TAYA JUANEEMeter ID: [id number]       POC Glucose Once [[id number]]  (Abnormal) Collected: 12/13/21 1706    Specimen: Blood Updated: 12/13/21 1951     Glucose 287 mg/dL      Comment: RN NotifiedOperator: [id number] TAYA Nguyễn ID:  [id number]       POC Glucose Once [[id number]]  (Abnormal) Collected: 12/13/21 1617    Specimen: Blood Updated: 12/13/21 1951     Glucose 324 mg/dL      Comment: RN NotifiedOperator: [id number] TAYA RIVERAMeter ID: [id number]       Extra Tubes [[id number]] Collected: 12/13/21 1416    Specimen: Blood, Venous Line Updated: 12/13/21 1531    Narrative:      The following orders were created for panel order Extra Tubes.  Procedure                               Abnormality         Status                     ---------                               -----------         ------                     Lavender Top[[id number]]                                     Final result               Lavender Top[[id number]]                                     Final result               Green Top (Gel)[[id number]]                                  Final result               Light Blue Top[[id number]]                                   Final result                 Please view results for these tests on the individual orders.    Light Blue Top [[id number]] Collected: 12/13/21 1416    Specimen: Blood Updated: 12/13/21 1531     Extra Tube hold for add-on     Comment: Auto resulted       Green Top (Gel) [[id number]] Collected: 12/13/21 1416    Specimen: Blood Updated: 12/13/21 1531     Extra Tube Hold for add-ons.     Comment: Auto resulted.       Lavender Top [[id number]] Collected: 12/13/21 1416    Specimen: Blood Updated: 12/13/21 1531     Extra Tube hold for add-on     Comment: Auto resulted       Lavender Top [[id number]] Collected: 12/13/21 1416    Specimen: Blood Updated: 12/13/21 1531     Extra Tube hold for add-on     Comment: Auto resulted       Hepatitis B Surface Antigen [[id number]]  (Normal) Collected: 12/13/21 1421    Specimen: Blood Updated: 12/13/21 1455     Hepatitis B Surface Ag Non-Reactive    Basic Metabolic Panel [[id number]]  (Abnormal) Collected: 12/13/21 1258    Specimen: Blood Updated: 12/13/21 1326     Glucose 565 mg/dL      BUN 57  mg/dL      Creatinine 3.38 mg/dL      Sodium 124 mmol/L      Potassium 5.2 mmol/L      Chloride 90 mmol/L      CO2 20.0 mmol/L      Calcium 8.6 mg/dL      eGFR   Amer --     Comment: <15 Indicative of kidney failure.        eGFR Non African Amer 14 mL/min/1.73      Comment: <15 Indicative of kidney failure.        BUN/Creatinine Ratio 16.9     Anion Gap 14.0 mmol/L     Narrative:      GFR Normal >60  Chronic Kidney Disease <60  Kidney Failure <15      POC Glucose Once [[id number]]  (Abnormal) Collected: 12/13/21 1126    Specimen: Blood Updated: 12/13/21 1209     Glucose 572 mg/dL      Comment: RN NotifiedNotify DoctorOperator: [id number] WALLY VINCENTMeter ID: [id number]       POC Glucose Once [[id number]]  (Abnormal) Collected: 12/13/21 1030    Specimen: Blood Updated: 12/13/21 1208     Glucose 518 mg/dL      Comment: Result Not ConfirmedOperator: [id number] RAMBO Roque ID: [id number]       POC Glucose Once [[id number]]  (Abnormal) Collected: 12/13/21 0940    Specimen: Blood Updated: 12/13/21 1208     Glucose 552 mg/dL      Comment: RN NotifiedNotify DoctorOperator: [id number] WALLY SIEGELENMeter ID: [id number]       POC Glucose Once [[id number]]  (Abnormal) Collected: 12/13/21 0752    Specimen: Blood Updated: 12/13/21 1208     Glucose 521 mg/dL      Comment: RN NotifiedNotify DoctorOperator: [id number] WALLY VINCENTMeter ID: [id number]       POC Glucose Once [[id number]]  (Abnormal) Collected: 12/13/21 0602    Specimen: Blood Updated: 12/13/21 1208     Glucose 530 mg/dL      Comment: Notify DoctorOperator: [id number] MARITZA NESBITTMeter ID: [id number]       POC Glucose Once [[id number]]  (Abnormal) Collected: 12/12/21 2303    Specimen: Blood Updated: 12/13/21 1206     Glucose 470 mg/dL      Comment: Notify DoctorOperator: [id number] MARITZA NESBITTMeter ID: [id number]              Imaging Results (Last 24 Hours)     ** No results found for the last 24 hours. **          I reviewed the patient's new  clinical results.    Assessment/Plan:     Active Hospital Problems    Diagnosis    • **Uncontrolled type 2 diabetes mellitus with complication, with long-term current use of insulin (HCC)      · Levemir 60 units nightly divided into two 30 units doses to be administered together  · Novolog 30 units tid before meals  · High dose SSI  · States she has an insulin pump at home from Dr. Calvillo but has never been able to come to clinic to have it installed.   · Has a Freestyle payam  · Continue Gabapentin 800mg tid  · Continue Duloxetine 20mg daily  Lab Results   Component Value Date    HGBA1C 8.80 (H) 10/26/2021 ·     · Now on insulin ggt  · BMP q4h       • Dyspnea    • Personal history of noncompliance with medical treatment, presenting hazards to health      · Difficulty showing up to clinic visits due to complexity of medical problems and transportation  · Would really benefit from a couple home visits from PCP to help improve compliance     • Anemia due to chronic kidney disease, on chronic dialysis (HCC)      · Nephrology consult in am for dialysis scheduled MWF  · Epoetin (Retacrit) 10,000 units three times a week on dialysis days MWF  · If Hgb less than 7 transfuse PRBCs  · Type & Screen  ·  daily CBC     • MIKE and COPD overlap syndrome (HCC)      · Home Trilogy  · On room air at baseline and here     • CKD (chronic kidney disease), symptom management only, stage 5 (HCC)      Results from last 7 days   Lab Units 12/14/21  0410 12/14/21  0007 12/13/21 2007   CREATININE mg/dL 2.98* 2.98* 2.73*     · Baseline creatinine 2-3 GFR 13-25  · GFR 15  · Dialysis MWF, sees Dr. Lauren  · Nephrology consult,, appreciate recommendations  · Continue Bumex 1mg bid daily  · Holding Bumex 2mg 4 times a week     • Chronic respiratory failure with hypoxia (HCC)      · Uses home O2 3L for sats 88%-92%  · Currently on room air  · Strict maintain sats 88%-92% for respiratory drive  · Home CPAP Trilogy at night  · If decompensates,  stat ABG, CXR, EKG  · CXR showed shelley infiltrative changes on the right, with right pleural effusion, consistent with several prior CXR in the last few months  · Azithromycin & Ceftriaxone X 1 in the ER     • Hypothyroidism      · Levothyroxine 25mcg  Lab Results   Component Value Date    TSH 1.120 04/24/2021 ·          • Dyslipidemia      · Continue Atorvastatin 20mg daily  Lab Results   Component Value Date    CHOL 130 06/02/2021    CHLPL 153 11/09/2016    TRIG 338 (H) 06/02/2021    HDL 39 (L) 06/02/2021    LDL 41 06/02/2021 ·          • Essential hypertension      · Elevated BP in ER SBP 160s  · Metoprolol 50mg bid, hold if HR < 60bpm  · Lisinopril 5mg daily  · Telemetry  · Hydralazine po 10mg prn q6h for SBP > 170  · Patient with slight bradycardia 50s     • Coronary artery disease      · S/P CABG x 3 (Primary), Bilateral carotid artery stenosis w/ 2 stents on left side,  PAD (peripheral artery disease) with stent in right upper leg, Morbid obesity Body mass index is 52.68 kg/m².  · Continue aspirin, Plavix, atorvastatin, lisinopril,  isosorbide mononitrate 60 mg daily   · EKG: No ST segment changes.   · Troponin elevated chronically 0.032, #2 0.025, trend   · Last cath 11/13/21: Left heart catheterization with iliofemoral arteriogram. Pronto thrombectomy of the left main coronary artery. PTCA and stenting of the distal left main and ostial circumflex artery with deployment of a 3.25 mm x 15 mm Xience Gloria drug-eluting stent. Selective cannulation of the left internal mammary artery. Selective cannulation of the saphenous vein graft to the right coronary artery and a saphenous vein graft to the ramus intermedius branch. Not a candidate for single vessel CABG. Sees Dr. Rios.   · Ranolazine 500mg bid             DVT prophylaxis: Heparin  Code Status and Medical Interventions:   Ordered at: 12/12/21 0060     Level Of Support Discussed With:    Patient     Code Status (Patient has no pulse and is not  breathing):    CPR (Attempt to Resuscitate)     Medical Interventions (Patient has pulse or is breathing):    Full Support       Plan for disposition:Home or SNF in 1-3 days      Time: 20 minutes        Jerman Coffman MD   PGY-2    [place] [address]  Office: [phone]    This document has been electronically signed by Jerman Coffman MD on December 14, 2021 08:04 CST

## 2021-12-15 ENCOUNTER — READMISSION MANAGEMENT (OUTPATIENT)
Dept: CALL CENTER | Facility: HOSPITAL | Age: 61
End: 2021-12-15

## 2021-12-15 VITALS
HEIGHT: 62 IN | OXYGEN SATURATION: 96 % | TEMPERATURE: 98 F | RESPIRATION RATE: 18 BRPM | HEART RATE: 71 BPM | DIASTOLIC BLOOD PRESSURE: 58 MMHG | WEIGHT: 291.38 LBS | BODY MASS INDEX: 53.62 KG/M2 | SYSTOLIC BLOOD PRESSURE: 113 MMHG

## 2021-12-15 PROBLEM — R06.00 DYSPNEA: Status: RESOLVED | Noted: 2021-12-13 | Resolved: 2021-12-15

## 2021-12-15 LAB
ALBUMIN SERPL-MCNC: 3.3 G/DL (ref 3.5–5.2)
ALBUMIN/GLOB SERPL: 0.9 G/DL
ALP SERPL-CCNC: 135 U/L (ref 39–117)
ALT SERPL W P-5'-P-CCNC: 10 U/L (ref 1–33)
ANION GAP SERPL CALCULATED.3IONS-SCNC: 14 MMOL/L (ref 5–15)
AST SERPL-CCNC: 21 U/L (ref 1–32)
BASOPHILS # BLD AUTO: 0.08 10*3/MM3 (ref 0–0.2)
BASOPHILS NFR BLD AUTO: 0.9 % (ref 0–1.5)
BILIRUB SERPL-MCNC: 0.2 MG/DL (ref 0–1.2)
BUN SERPL-MCNC: 70 MG/DL (ref 8–23)
BUN/CREAT SERPL: 17.9 (ref 7–25)
CALCIUM SPEC-SCNC: 8.4 MG/DL (ref 8.6–10.5)
CHLORIDE SERPL-SCNC: 92 MMOL/L (ref 98–107)
CO2 SERPL-SCNC: 22 MMOL/L (ref 22–29)
CREAT SERPL-MCNC: 3.92 MG/DL (ref 0.57–1)
DEPRECATED RDW RBC AUTO: 55.6 FL (ref 37–54)
EOSINOPHIL # BLD AUTO: 0.26 10*3/MM3 (ref 0–0.4)
EOSINOPHIL NFR BLD AUTO: 2.8 % (ref 0.3–6.2)
ERYTHROCYTE [DISTWIDTH] IN BLOOD BY AUTOMATED COUNT: 17.2 % (ref 12.3–15.4)
GFR SERPL CREATININE-BSD FRML MDRD: 12 ML/MIN/1.73
GFR SERPL CREATININE-BSD FRML MDRD: ABNORMAL ML/MIN/{1.73_M2}
GLOBULIN UR ELPH-MCNC: 3.6 GM/DL
GLUCOSE BLDC GLUCOMTR-MCNC: 292 MG/DL (ref 70–130)
GLUCOSE SERPL-MCNC: 287 MG/DL (ref 65–99)
HCT VFR BLD AUTO: 24.9 % (ref 34–46.6)
HGB BLD-MCNC: 7.9 G/DL (ref 12–15.9)
IMM GRANULOCYTES # BLD AUTO: 0.14 10*3/MM3 (ref 0–0.05)
IMM GRANULOCYTES NFR BLD AUTO: 1.5 % (ref 0–0.5)
LYMPHOCYTES # BLD AUTO: 3.17 10*3/MM3 (ref 0.7–3.1)
LYMPHOCYTES NFR BLD AUTO: 34.5 % (ref 19.6–45.3)
MCH RBC QN AUTO: 28.2 PG (ref 26.6–33)
MCHC RBC AUTO-ENTMCNC: 31.7 G/DL (ref 31.5–35.7)
MCV RBC AUTO: 88.9 FL (ref 79–97)
MONOCYTES # BLD AUTO: 0.65 10*3/MM3 (ref 0.1–0.9)
MONOCYTES NFR BLD AUTO: 7.1 % (ref 5–12)
NEUTROPHILS NFR BLD AUTO: 4.89 10*3/MM3 (ref 1.7–7)
NEUTROPHILS NFR BLD AUTO: 53.2 % (ref 42.7–76)
NRBC BLD AUTO-RTO: 0 /100 WBC (ref 0–0.2)
PLATELET # BLD AUTO: 205 10*3/MM3 (ref 140–450)
PMV BLD AUTO: 9 FL (ref 6–12)
POTASSIUM SERPL-SCNC: 4.6 MMOL/L (ref 3.5–5.2)
PROT SERPL-MCNC: 6.9 G/DL (ref 6–8.5)
RBC # BLD AUTO: 2.8 10*6/MM3 (ref 3.77–5.28)
SODIUM SERPL-SCNC: 128 MMOL/L (ref 136–145)
WBC NRBC COR # BLD: 9.19 10*3/MM3 (ref 3.4–10.8)

## 2021-12-15 PROCEDURE — 94799 UNLISTED PULMONARY SVC/PX: CPT

## 2021-12-15 PROCEDURE — 99239 HOSP IP/OBS DSCHRG MGMT >30: CPT | Performed by: STUDENT IN AN ORGANIZED HEALTH CARE EDUCATION/TRAINING PROGRAM

## 2021-12-15 PROCEDURE — 94640 AIRWAY INHALATION TREATMENT: CPT

## 2021-12-15 PROCEDURE — 25010000002 HEPARIN (PORCINE) PER 1000 UNITS: Performed by: INTERNAL MEDICINE

## 2021-12-15 PROCEDURE — 5A1D70Z PERFORMANCE OF URINARY FILTRATION, INTERMITTENT, LESS THAN 6 HOURS PER DAY: ICD-10-PCS | Performed by: INTERNAL MEDICINE

## 2021-12-15 PROCEDURE — 82962 GLUCOSE BLOOD TEST: CPT

## 2021-12-15 PROCEDURE — 85025 COMPLETE CBC W/AUTO DIFF WBC: CPT | Performed by: STUDENT IN AN ORGANIZED HEALTH CARE EDUCATION/TRAINING PROGRAM

## 2021-12-15 PROCEDURE — 80053 COMPREHEN METABOLIC PANEL: CPT | Performed by: STUDENT IN AN ORGANIZED HEALTH CARE EDUCATION/TRAINING PROGRAM

## 2021-12-15 PROCEDURE — 63710000001 INSULIN ASPART PER 5 UNITS: Performed by: STUDENT IN AN ORGANIZED HEALTH CARE EDUCATION/TRAINING PROGRAM

## 2021-12-15 PROCEDURE — 25010000002 EPOETIN ALFA-EPBX 10000 UNIT/ML SOLUTION: Performed by: INTERNAL MEDICINE

## 2021-12-15 PROCEDURE — 25010000002 HEPARIN (PORCINE) PER 1000 UNITS: Performed by: STUDENT IN AN ORGANIZED HEALTH CARE EDUCATION/TRAINING PROGRAM

## 2021-12-15 PROCEDURE — 94760 N-INVAS EAR/PLS OXIMETRY 1: CPT

## 2021-12-15 RX ORDER — ISOSORBIDE MONONITRATE 30 MG/1
30 TABLET, EXTENDED RELEASE ORAL EVERY MORNING
Qty: 30 TABLET | Refills: 0 | Status: SHIPPED | OUTPATIENT
Start: 2021-12-15 | End: 2022-02-24 | Stop reason: SDUPTHER

## 2021-12-15 RX ORDER — LISINOPRIL 5 MG/1
5 TABLET ORAL DAILY
Qty: 30 TABLET | Refills: 0 | Status: SHIPPED | OUTPATIENT
Start: 2021-12-15 | End: 2022-02-01 | Stop reason: SDUPTHER

## 2021-12-15 RX ORDER — BUMETANIDE 2 MG/1
2 TABLET ORAL
Qty: 16 TABLET | Refills: 0 | Status: ON HOLD | OUTPATIENT
Start: 2021-12-16 | End: 2022-01-10

## 2021-12-15 RX ORDER — GABAPENTIN 300 MG/1
600 CAPSULE ORAL EVERY 12 HOURS SCHEDULED
Status: DISCONTINUED | OUTPATIENT
Start: 2021-12-15 | End: 2021-12-15 | Stop reason: HOSPADM

## 2021-12-15 RX ORDER — RANOLAZINE 500 MG/1
500 TABLET, EXTENDED RELEASE ORAL EVERY 12 HOURS SCHEDULED
Qty: 60 TABLET | Refills: 0 | Status: SHIPPED | OUTPATIENT
Start: 2021-12-15 | End: 2022-02-01 | Stop reason: SDUPTHER

## 2021-12-15 RX ADMIN — IPRATROPIUM BROMIDE AND ALBUTEROL SULFATE 3 ML: 2.5; .5 SOLUTION RESPIRATORY (INHALATION) at 10:23

## 2021-12-15 RX ADMIN — ISOSORBIDE MONONITRATE 30 MG: 30 TABLET, EXTENDED RELEASE ORAL at 05:20

## 2021-12-15 RX ADMIN — GABAPENTIN 800 MG: 400 CAPSULE ORAL at 05:20

## 2021-12-15 RX ADMIN — NYSTATIN: 100000 POWDER TOPICAL at 10:46

## 2021-12-15 RX ADMIN — INSULIN ASPART 40 UNITS: 100 INJECTION, SOLUTION INTRAVENOUS; SUBCUTANEOUS at 12:00

## 2021-12-15 RX ADMIN — ATORVASTATIN CALCIUM 20 MG: 20 TABLET, FILM COATED ORAL at 10:46

## 2021-12-15 RX ADMIN — OXYBUTYNIN CHLORIDE 5 MG: 5 TABLET, EXTENDED RELEASE ORAL at 10:45

## 2021-12-15 RX ADMIN — FERROUS SULFATE TAB EC 324 MG (65 MG FE EQUIVALENT) 324 MG: 324 (65 FE) TABLET DELAYED RESPONSE at 10:46

## 2021-12-15 RX ADMIN — LEVOTHYROXINE SODIUM 25 MCG: 25 TABLET ORAL at 05:20

## 2021-12-15 RX ADMIN — INSULIN ASPART 12 UNITS: 100 INJECTION, SOLUTION INTRAVENOUS; SUBCUTANEOUS at 17:21

## 2021-12-15 RX ADMIN — INSULIN ASPART 20 UNITS: 100 INJECTION, SOLUTION INTRAVENOUS; SUBCUTANEOUS at 10:47

## 2021-12-15 RX ADMIN — IPRATROPIUM BROMIDE AND ALBUTEROL SULFATE 3 ML: 2.5; .5 SOLUTION RESPIRATORY (INHALATION) at 07:06

## 2021-12-15 RX ADMIN — CYCLOBENZAPRINE HYDROCHLORIDE 10 MG: 10 TABLET, FILM COATED ORAL at 11:05

## 2021-12-15 RX ADMIN — RANOLAZINE 500 MG: 500 TABLET, FILM COATED, EXTENDED RELEASE ORAL at 10:46

## 2021-12-15 RX ADMIN — CLOPIDOGREL BISULFATE 75 MG: 75 TABLET ORAL at 10:46

## 2021-12-15 RX ADMIN — IPRATROPIUM BROMIDE AND ALBUTEROL SULFATE 3 ML: 2.5; .5 SOLUTION RESPIRATORY (INHALATION) at 14:28

## 2021-12-15 RX ADMIN — HEPARIN SODIUM 3800 UNITS: 1000 INJECTION, SOLUTION INTRAVENOUS; SUBCUTANEOUS at 17:48

## 2021-12-15 RX ADMIN — NYSTATIN: 100000 POWDER TOPICAL at 17:22

## 2021-12-15 RX ADMIN — EPOETIN ALFA-EPBX 10000 UNITS: 10000 INJECTION, SOLUTION INTRAVENOUS; SUBCUTANEOUS at 14:12

## 2021-12-15 RX ADMIN — DULOXETINE HYDROCHLORIDE 20 MG: 20 CAPSULE, DELAYED RELEASE ORAL at 10:45

## 2021-12-15 RX ADMIN — HEPARIN SODIUM 5000 UNITS: 5000 INJECTION INTRAVENOUS; SUBCUTANEOUS at 05:21

## 2021-12-15 RX ADMIN — SODIUM CHLORIDE, PRESERVATIVE FREE 10 ML: 5 INJECTION INTRAVENOUS at 10:48

## 2021-12-15 RX ADMIN — CETIRIZINE HYDROCHLORIDE 10 MG: 10 TABLET, FILM COATED ORAL at 10:45

## 2021-12-15 RX ADMIN — LISINOPRIL 5 MG: 5 TABLET ORAL at 10:46

## 2021-12-15 RX ADMIN — METOPROLOL TARTRATE 50 MG: 50 TABLET, FILM COATED ORAL at 10:45

## 2021-12-15 RX ADMIN — ASPIRIN 81 MG: 81 TABLET, FILM COATED ORAL at 10:45

## 2021-12-15 RX ADMIN — INSULIN ASPART 40 UNITS: 100 INJECTION, SOLUTION INTRAVENOUS; SUBCUTANEOUS at 17:22

## 2021-12-15 NOTE — DISCHARGE PLACEMENT REQUEST
"Yamileth Slater (61 y.o. Female)             Date of Birth Social Security Number Address Home Phone MRN    1960  [address]  [place] [id number] [phone] [id number]    Yarsani Marital Status             None        Admission Date Admission Type Admitting Provider Attending Provider Department, Room/Bed    12/12/21 Emergency Alex Wells MD Banks, Andrew, MD [facility], 355/1    Discharge Date Discharge Disposition Discharge Destination           Home or Self Care              Attending Provider: Jerman Coffman MD    Allergies: Adhesive Tape, Other    Isolation: Contact   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                 Active Insurance as of 12/12/2021     Primary Coverage     Payor Plan Insurance Group Employer/Plan Group    HUMANA MEDICARE REPLACEMENT HUMANA MEDICARE REPLACEMENT [id number]     Payor Plan Address Payor Plan Phone Number Payor Plan Fax Number Effective Dates    [address] [address] [phone]  7/1/2020 - None Entered    [facility] [id number]       Subscriber Name Subscriber Birth Date Member ID       YAMILETH SLATER 1960 [id number]           Secondary Coverage     Payor Plan Insurance Group Employer/Plan Group    [place]      Payor Plan Address Payor Plan Phone Number Payor Plan Fax Number Effective Dates    [address] [phone]  6/28/2019 - None Entered    [facility] [id number]       Subscriber Name Subscriber Birth Date Member ID       YAMILETH SLATER 1960 [id number]                 Emergency Contacts      (Rel.) Home Phone Work Phone Mobile Phone    Yamileth Chavez (Daughter) [phone] " -- [phone]    Suzanne Rivera (Daughter) [phone] -- [phone]    Huy Slater (Spouse) [phone] -- [phone]            [address] [address]  Phone:  [phone]  Fax:  [phone] Date: Dec 15, 2021      Ambulatory Referral to Home Health     Patient:  Yamileth Slater MRN:  [id number]   [address]  [place] [address] :  1960  SSN:    Phone: [phone] Sex:  F      INSURANCE PAYOR PLAN GROUP # SUBSCRIBER ID   Primary:  Secondary:    HUMANA MEDICARE REPLACEMENT  [place] [id number]  [id number]    [id number]  [id number]      Referring Provider Information:  DAVID ALMENDAREZ Phone: [phone] Fax: [phone]      Referral Information:   # Visits:  999 Referral Type: Home Health [42]   Urgency:  Routine Referral Reason: Specialty Services Required   Start Date: Dec 15, 2021 End Date:  To be determined by Insurer   Diagnosis: End stage renal disease (HCC) (N18.6 [ICD-10-CM] 585.6 [ICD-9-CM])  Mucopurulent chronic bronchitis (HCC) (J41.1 [ICD-10-CM] 491.1 [ICD-9-CM])  CKD (chronic kidney disease), symptom management only, stage 5 (HCC) (N18.5 [ICD-10-CM] 585.5 [ICD-9-CM])      Refer to Dept:   Refer to Provider:   Refer to Facility:       Face to Face Visit Date: 12/15/2021  Follow-up provider for Plan of Care? I treated the patient in an acute care facility and will not continue treatment after discharge.  Follow-up provider: MAITE QUINN [[id number]]  Reason/Clinical Findings: CKD stage 5, dialysis patient, debility 2/2 obesity  Describe mobility limitations that make leaving home difficult: decreased ability to ambulate 2/2 respiratory status and obesity  Nursing/Therapeutic Services Requested: Skilled Nursing  Nursing/Therapeutic Services Requested: Physical Therapy  Nursing/Therapeutic Services Requested: Occupational Therapy  Skilled nursing orders: CHF management  PT  orders: Therapeutic exercise  Occupational orders: Activities of daily living  Frequency: 1 Week 1     This document serves as a request of services and does not constitute Insurance authorization or approval of services.  To determine eligibility, please contact the members Insurance carrier to verify and review coverage.     If you have medical questions regarding this request for services. Please contact [address] at [phone] during normal business hours.        Authorizing Provider:Jerman Coffman MD  Authorizing Provider's NPI: [id number]  Order Entered By: Jerman Coffman MD 12/15/2021  2:55 PM     Electronically signed by: Jerman Coffman MD 12/15/2021  2:55 PM       Insurance Information                HUMANA MEDICARE REPLACEMENT/HUMANA MEDICARE REPLACEMENT Phone: [phone]    Subscriber: Yamileth Slater Subscriber#: [id number]    Group#: [id number] Precert#: --        [place] Phone: [phone]    Subscriber: Yamileth Slater Subscriber#: [id number]    Group#: -- Precert#: --

## 2021-12-15 NOTE — PROGRESS NOTES
FAMILY MEDICINE DAILY PROGRESS NOTE  NAME: Yamileth Slater  : 1960  MRN: [id number]     LOS: 2 days     PROVIDER OF SERVICE: Jerman Coffman MD    Chief Complaint: Uncontrolled type 2 diabetes mellitus with complication, with long-term current use of insulin (HCC)    Subjective:     Interval History:  History taken from: patient chart RN    Patient unhappy with her fluid restriction at this time.  Denies pain right now.  Use trilogy overnight without issue.  She is on room air this morning satting well.  Patient is due for dialysis today.  She had some concern about her hemoglobin which is at 7.9.  Patient's sodium is down to 128.  We will managed with dialysis.  Patient did not receive any sliding scale insulin yesterday.  She was restarted back on her home regiment of 60 units long-acting, 40 units short-acting with meals.  She had one dose held secondary to glucose being less than 100.  Questionable compliance with insulin regimen and diet at home.  No signs or symptoms of hypoglycemia.    Review of Systems:   Review of Systems   Constitutional: Negative for chills, fatigue and fever.   HENT: Negative for congestion, postnasal drip and rhinorrhea.    Eyes: Negative for pain and visual disturbance.   Respiratory: Negative for cough and shortness of breath.    Cardiovascular: Negative for chest pain, palpitations and leg swelling.   Gastrointestinal: Negative for abdominal pain, diarrhea, nausea and vomiting.   Genitourinary: Negative for difficulty urinating and dysuria.   Musculoskeletal: Negative for arthralgias and back pain.   Neurological: Negative for dizziness and light-headedness.       Objective:     Vital Signs  Temp:  [97 °F (36.1 °C)-98.2 °F (36.8 °C)] 97.9 °F (36.6 °C)  Heart Rate:  [50-64] 50  Resp:  [17-20] 18  BP: (122-176)/(58-91) 129/62    Physical Exam  Physical Exam  Vitals reviewed.   Constitutional:       Appearance: She is well-developed. She is obese. She is not ill-appearing or  diaphoretic.   HENT:      Head: Normocephalic and atraumatic.      Mouth/Throat:      Pharynx: No oropharyngeal exudate.   Eyes:      General: No scleral icterus.     Conjunctiva/sclera: Conjunctivae normal.   Cardiovascular:      Rate and Rhythm: Normal rate and regular rhythm.      Heart sounds: No murmur heard.      Pulmonary:      Effort: Pulmonary effort is normal. No respiratory distress.      Breath sounds: Normal breath sounds. No wheezing or rhonchi.   Abdominal:      General: Bowel sounds are normal. There is no distension.      Palpations: Abdomen is soft.      Tenderness: There is no abdominal tenderness. Negative signs include Frazier's sign.   Musculoskeletal:         General: No deformity.      Right lower leg: No edema.      Left lower leg: No edema.   Skin:     General: Skin is warm and dry.   Neurological:      General: No focal deficit present.      Mental Status: She is alert and oriented to person, place, and time. Mental status is at baseline.   Psychiatric:         Behavior: Behavior normal.         Medication Review    Current Facility-Administered Medications:   •  albumin human 25 % IV SOLN 12.5 g, 12.5 g, Intravenous, PRN, Ace Lauren MD  •  albuterol (PROVENTIL) nebulizer solution 0.083% 2.5 mg/3mL, 2.5 mg, Nebulization, Q4H PRN, Alejandra Altamirano MD  •  aspirin EC tablet 81 mg, 81 mg, Oral, Daily, Alejandra Altamirano MD, 81 mg at 12/14/21 0838  •  atorvastatin (LIPITOR) tablet 20 mg, 20 mg, Oral, Daily, Alejandra Altamirano MD, 20 mg at 12/14/21 0838  •  [START ON 12/16/2021] bumetanide (BUMEX) tablet 2 mg, 2 mg, Oral, Once per day on Sun Tue Thu Sat, Ace Lauren MD  •  cetirizine (zyrTEC) tablet 10 mg, 10 mg, Oral, Daily, Alejandra Altamirano MD, 10 mg at 12/14/21 0839  •  clopidogrel (PLAVIX) tablet 75 mg, 75 mg, Oral, Daily, Alejandra Altamirano MD, 75 mg at 12/14/21 0837  •  cyclobenzaprine (FLEXERIL) tablet 10 mg, 10 mg, Oral, BID PRN, Alejandra Altamirano MD, 10 mg at 12/13/21 2026  •   dextrose (D50W) (25 g/50 mL) IV injection 25 g, 25 g, Intravenous, Q15 Min PRN, Jerman Coffman MD  •  dextrose (GLUTOSE) oral gel 15 g, 15 g, Oral, Q15 Min PRN, Jerman Coffman MD  •  DULoxetine (CYMBALTA) DR capsule 20 mg, 20 mg, Oral, Daily, Alejandra Altamirano MD, 20 mg at 12/14/21 0838  •  epoetin hubert-epbx (RETACRIT) injection 10,000 Units, 10,000 Units, Intravenous, Once per day on Mon Wed Fri, Ace Lauren MD  •  ferrous sulfate EC tablet 324 mg, 324 mg, Oral, Daily With Breakfast, Alejandra Altamirano MD, 324 mg at 12/14/21 0837  •  gabapentin (NEURONTIN) capsule 800 mg, 800 mg, Oral, Q8H, Alejandra Altamirano MD, 800 mg at 12/15/21 0520  •  glucagon (human recombinant) (GLUCAGEN DIAGNOSTIC) injection 1 mg, 1 mg, Subcutaneous, Q15 Min PRN, Jerman Coffman MD  •  heparin (porcine) 5000 UNIT/ML injection 5,000 Units, 5,000 Units, Subcutaneous, Q8H, Alejandra Altamirano MD, 5,000 Units at 12/15/21 0521  •  heparin (porcine) injection 2,000 Units, 2,000 Units, Intracatheter, PRN, Alejandra Altamirano MD, 4,000 Units at 12/13/21 1738  •  heparin (porcine) injection 2,000 Units, 2,000 Units, Intracatheter, PRN, Ace Lauren MD  •  hydrALAZINE (APRESOLINE) tablet 10 mg, 10 mg, Oral, Q6H PRN, Alejandra Altamirano MD  •  insulin aspart (novoLOG) injection 0-24 Units, 0-24 Units, Subcutaneous, TID Augustus NEGRON Andrew, MD  •  insulin aspart (novoLOG) injection 40 Units, 40 Units, Subcutaneous, TID Augustus NEGRON Andrew, MD  •  insulin detemir (LEVEMIR) injection 30 Units, 30 Units, Subcutaneous, Nightly, 30 Units at 12/14/21 0896 **AND** insulin detemir (LEVEMIR) injection 30 Units, 30 Units, Subcutaneous, Nightly, Alejandra Altamirano MD, 30 Units at 12/14/21 2146  •  ipratropium-albuterol (DUO-NEB) nebulizer solution 3 mL, 3 mL, Nebulization, 4x Daily - RT, Alejandra Altamirano MD, 3 mL at 12/13/21 1044  •  isosorbide mononitrate (IMDUR) 24 hr tablet 30 mg, 30 mg, Oral, QAM, Alejandra Altamirano MD, 30 mg at 12/15/21 0520  •  levothyroxine  (SYNTHROID, LEVOTHROID) tablet 25 mcg, 25 mcg, Oral, Q AM, Alejandra Altamirano MD, 25 mcg at 12/15/21 0520  •  lisinopril (PRINIVIL,ZESTRIL) tablet 5 mg, 5 mg, Oral, Daily, Alejandra Altamirano MD, 5 mg at 12/14/21 0838  •  metoprolol tartrate (LOPRESSOR) tablet 50 mg, 50 mg, Oral, Q12H, Alejandra Altamirano MD, 50 mg at 12/14/21 1954  •  montelukast (SINGULAIR) tablet 10 mg, 10 mg, Oral, Nightly, Alejandra Altamirano MD, 10 mg at 12/14/21 1954  •  nystatin (MYCOSTATIN) powder, , Topical, TID, Alejandra Altamirano MD, Given at 12/14/21 0840  •  ondansetron ODT (ZOFRAN-ODT) disintegrating tablet 4 mg, 4 mg, Oral, Q8H PRN, Alejandra Altamirano MD  •  oxybutynin XL (DITROPAN-XL) 24 hr tablet 5 mg, 5 mg, Oral, Daily, Alejandra Altamirano MD, 5 mg at 12/14/21 0837  •  pantoprazole (PROTONIX) EC tablet 40 mg, 40 mg, Oral, Nightly, Alejandra Altamirano MD, 40 mg at 12/14/21 1954  •  ranolazine (RANEXA) 12 hr tablet 500 mg, 500 mg, Oral, Q12H, Alejandra Altamirano MD, 500 mg at 12/14/21 1954  •  sennosides-docusate (PERICOLACE) 8.6-50 MG per tablet 2 tablet, 2 tablet, Oral, BID, Alejandra Altamirano MD, 2 tablet at 12/13/21 0824  •  [COMPLETED] Insert peripheral IV, , , Once **AND** sodium chloride 0.9 % flush 10 mL, 10 mL, Intravenous, PRN, Alejandra Altamirano MD  •  sodium chloride 0.9 % flush 10 mL, 10 mL, Intravenous, Q12H, Alejandra Altamirano MD, 10 mL at 12/14/21 2025  •  sodium chloride 0.9 % flush 10 mL, 10 mL, Intravenous, PRN, Alejandra Altamirano MD     Diagnostic Data    Lab Results (last 24 hours)     Procedure Component Value Units Date/Time    Comprehensive Metabolic Panel [[id number]]  (Abnormal) Collected: 12/15/21 0548    Specimen: Blood Updated: 12/15/21 0614     Glucose 287 mg/dL      BUN 70 mg/dL      Creatinine 3.92 mg/dL      Sodium 128 mmol/L      Potassium 4.6 mmol/L      Chloride 92 mmol/L      CO2 22.0 mmol/L      Calcium 8.4 mg/dL      Total Protein 6.9 g/dL      Albumin 3.30 g/dL      ALT (SGPT) 10 U/L      AST (SGOT) 21 U/L       Alkaline Phosphatase 135 U/L      Total Bilirubin 0.2 mg/dL      eGFR Non African Amer 12 mL/min/1.73      Comment: <15 Indicative of kidney failure.        eGFR   Amer --     Comment: <15 Indicative of kidney failure.        Globulin 3.6 gm/dL      A/G Ratio 0.9 g/dL      BUN/Creatinine Ratio 17.9     Anion Gap 14.0 mmol/L     Narrative:      GFR Normal >60  Chronic Kidney Disease <60  Kidney Failure <15      CBC Auto Differential [[id number]]  (Abnormal) Collected: 12/15/21 0548    Specimen: Blood Updated: 12/15/21 0606     WBC 9.19 10*3/mm3      RBC 2.80 10*6/mm3      Hemoglobin 7.9 g/dL      Hematocrit 24.9 %      MCV 88.9 fL      MCH 28.2 pg      MCHC 31.7 g/dL      RDW 17.2 %      RDW-SD 55.6 fl      MPV 9.0 fL      Platelets 205 10*3/mm3      Neutrophil % 53.2 %      Lymphocyte % 34.5 %      Monocyte % 7.1 %      Eosinophil % 2.8 %      Basophil % 0.9 %      Immature Grans % 1.5 %      Neutrophils, Absolute 4.89 10*3/mm3      Lymphocytes, Absolute 3.17 10*3/mm3      Monocytes, Absolute 0.65 10*3/mm3      Eosinophils, Absolute 0.26 10*3/mm3      Basophils, Absolute 0.08 10*3/mm3      Immature Grans, Absolute 0.14 10*3/mm3      nRBC 0.0 /100 WBC     Blood Culture - Blood, Arm, Right [[id number]]  (Normal) Collected: 12/12/21 2219    Specimen: Blood from Arm, Right Updated: 12/14/21 2230     Blood Culture No growth at 2 days    Blood Culture - Blood, Wrist, Right [[id number]]  (Normal) Collected: 12/12/21 2136    Specimen: Blood from Wrist, Right Updated: 12/14/21 2145     Blood Culture No growth at 2 days    POC Glucose Once [[id number]]  (Abnormal) Collected: 12/14/21 1928    Specimen: Blood Updated: 12/14/21 1941     Glucose 191 mg/dL      Comment: RN NotifiedOperator: [id number] RAJ ACELAMeter ID: [id number]       POC Glucose Once [[id number]]  (Normal) Collected: 12/14/21 1621    Specimen: Blood Updated: 12/14/21 1634     Glucose 78 mg/dL      Comment: RN NotifiedOperator: [id number] ISMAEL  CRYSTALMeter ID: [id number]       Basic Metabolic Panel [[id number]]  (Abnormal) Collected: 12/14/21 1323    Specimen: Blood Updated: 12/14/21 1345     Glucose 181 mg/dL      BUN 48 mg/dL      Creatinine 3.21 mg/dL      Sodium 130 mmol/L      Potassium 3.9 mmol/L      Chloride 93 mmol/L      CO2 24.0 mmol/L      Calcium 8.6 mg/dL      eGFR Non African Amer 15 mL/min/1.73      BUN/Creatinine Ratio 15.0     Anion Gap 13.0 mmol/L     Narrative:      GFR Normal >60  Chronic Kidney Disease <60  Kidney Failure <15      POC Glucose Once [[id number]]  (Abnormal) Collected: 12/14/21 1049    Specimen: Blood Updated: 12/14/21 1148     Glucose 202 mg/dL      Comment: RN NotifiedOperator: [id number] DARIEN ESPINOIKAMeter ID: [id number]       POC Glucose Once [[id number]]  (Abnormal) Collected: 12/13/21 1513    Specimen: Blood Updated: 12/14/21 1106     Glucose 426 mg/dL      Comment: RN NotifiedOperator: [id number] TAYA CASAREZNNEEMeter ID: [id number]       POC Glucose Once [[id number]]  (Abnormal) Collected: 12/13/21 1419    Specimen: Blood Updated: 12/14/21 1106     Glucose 503 mg/dL      Comment: : [id number] DE LA GARZA BREANNAMeter ID: [id number]       POC Glucose Once [[id number]]  (Abnormal) Collected: 12/13/21 1219    Specimen: Blood Updated: 12/14/21 1106     Glucose 574 mg/dL      Comment: RN NotifiedNotify DoctorOperator: [id number] WALLY SIEGELENMeter ID: [id number]       Extra Tubes [[id number]] Collected: 12/14/21 0916    Specimen: Blood, Venous Line Updated: 12/14/21 1030    Narrative:      The following orders were created for panel order Extra Tubes.  Procedure                               Abnormality         Status                     ---------                               -----------         ------                     Lavender Top[[id number]]                                     Final result                 Please view results for these tests on the individual orders.    Lavender Top [[id number]] Collected:  12/14/21 0916    Specimen: Blood Updated: 12/14/21 1030     Extra Tube hold for add-on     Comment: Auto resulted       Basic Metabolic Panel [[id number]]  (Abnormal) Collected: 12/14/21 0916    Specimen: Blood Updated: 12/14/21 1010     Glucose 197 mg/dL      BUN 45 mg/dL      Creatinine 3.32 mg/dL      Sodium 131 mmol/L      Potassium 4.3 mmol/L      Chloride 94 mmol/L      CO2 24.0 mmol/L      Calcium 8.5 mg/dL      eGFR   Amer --     Comment: <15 Indicative of kidney failure.        eGFR Non African Amer 14 mL/min/1.73      Comment: <15 Indicative of kidney failure.        BUN/Creatinine Ratio 13.6     Anion Gap 13.0 mmol/L     Narrative:      GFR Normal >60  Chronic Kidney Disease <60  Kidney Failure <15      POC Glucose Once [[id number]]  (Abnormal) Collected: 12/14/21 0122    Specimen: Blood Updated: 12/14/21 0950     Glucose 133 mg/dL      Comment: : [id number] MAG HEATHERMeter ID: [id number]       POC Glucose Once [[id number]]  (Abnormal) Collected: 12/14/21 0007    Specimen: Blood Updated: 12/14/21 0949     Glucose 203 mg/dL      Comment: : [id number] MAG HEATHERMeter ID: [id number]       POC Glucose Once [[id number]]  (Abnormal) Collected: 12/13/21 2319    Specimen: Blood Updated: 12/14/21 0949     Glucose 225 mg/dL      Comment: : [id number] MAG HEATHERMeter ID: [id number]       POC Glucose Once [[id number]]  (Abnormal) Collected: 12/13/21 2125    Specimen: Blood Updated: 12/14/21 0949     Glucose 297 mg/dL      Comment: : [id number] MAG HEATHERMeter ID: [id number]       POC Glucose Once [[id number]]  (Abnormal) Collected: 12/13/21 2029    Specimen: Blood Updated: 12/14/21 0949     Glucose 263 mg/dL      Comment: : [id number] MAG HEATHERMeter ID: [id number]       POC Glucose Once [[id number]]  (Normal) Collected: 12/14/21 0612    Specimen: Blood Updated: 12/14/21 0631     Glucose 92 mg/dL      Comment: : [id number] MAG  HEATHERMeter ID: [id number]       POC Glucose Once [[id number]]  (Normal) Collected: 12/14/21 0530    Specimen: Blood Updated: 12/14/21 0631     Glucose 92 mg/dL      Comment: : [id number] MAG HEATHERMeter ID: [id number]              Imaging Results (Last 24 Hours)     ** No results found for the last 24 hours. **          I reviewed the patient's new clinical results.    Assessment/Plan:     Active Hospital Problems    Diagnosis    • **Uncontrolled type 2 diabetes mellitus with complication, with long-term current use of insulin ([facility])      · Levemir 60 units nightly divided into two 30 units doses to be administered together  · Novolog 30 units tid before meals  · High dose SSI  · States she has an insulin pump at home from Dr. Calvillo but has never been able to come to clinic to have it installed.   · Has a Freestyle payam  · Continue Gabapentin 800mg tid  · Continue Duloxetine 20mg daily  Lab Results   Component Value Date    HGBA1C 8.80 (H) 10/26/2021 ·     · Now on insulin ggt  · BMP q4h       • Dyspnea    • Personal history of noncompliance with medical treatment, presenting hazards to health      · Difficulty showing up to clinic visits due to complexity of medical problems and transportation  · Would really benefit from a couple home visits from PCP to help improve compliance     • Anemia due to chronic kidney disease, on chronic dialysis ([facility])      · Nephrology consult in am for dialysis scheduled MWF  · Epoetin (Retacrit) 10,000 units three times a week on dialysis days MWF  · If Hgb less than 7 transfuse PRBCs  · Type & Screen  ·  daily CBC     • MIKE and COPD overlap syndrome (HCC)      · Home Trilogy  · On room air at baseline and here     • CKD (chronic kidney disease), symptom management only, stage 5 ([facility])      Results from last 7 days   Lab Units 12/14/21  0410 12/14/21  0007 12/13/21 2007   CREATININE mg/dL 2.98* 2.98* 2.73*     · Baseline creatinine 2-3 GFR 13-25  · GFR  15  · Dialysis MWF, sees Dr. Lauren  · Nephrology consult,, appreciate recommendations  · Continue Bumex 1mg bid daily  · Holding Bumex 2mg 4 times a week     • Chronic respiratory failure with hypoxia (HCC)      · Uses home O2 3L for sats 88%-92%  · Currently on room air  · Strict maintain sats 88%-92% for respiratory drive  · Home CPAP Trilogy at night  · If decompensates, stat ABG, CXR, EKG  · CXR showed shelley infiltrative changes on the right, with right pleural effusion, consistent with several prior CXR in the last few months  · Azithromycin & Ceftriaxone X 1 in the ER     • Hypothyroidism      · Levothyroxine 25mcg  Lab Results   Component Value Date    TSH 1.120 04/24/2021 ·          • Dyslipidemia      · Continue Atorvastatin 20mg daily  Lab Results   Component Value Date    CHOL 130 06/02/2021    CHLPL 153 11/09/2016    TRIG 338 (H) 06/02/2021    HDL 39 (L) 06/02/2021    LDL 41 06/02/2021 ·          • Essential hypertension      · Elevated BP in ER SBP 160s  · Metoprolol 50mg bid, hold if HR < 60bpm  · Lisinopril 5mg daily  · Telemetry  · Hydralazine po 10mg prn q6h for SBP > 170  · Patient with slight bradycardia 50s     • Coronary artery disease      · S/P CABG x 3 (Primary), Bilateral carotid artery stenosis w/ 2 stents on left side,  PAD (peripheral artery disease) with stent in right upper leg, Morbid obesity Body mass index is 52.68 kg/m².  · Continue aspirin, Plavix, atorvastatin, lisinopril,  isosorbide mononitrate 60 mg daily   · EKG: No ST segment changes.   · Troponin elevated chronically 0.032, #2 0.025, trend   · Last cath 11/13/21: Left heart catheterization with iliofemoral arteriogram. Pronto thrombectomy of the left main coronary artery. PTCA and stenting of the distal left main and ostial circumflex artery with deployment of a 3.25 mm x 15 mm Xience Gloria drug-eluting stent. Selective cannulation of the left internal mammary artery. Selective cannulation of the saphenous vein graft to  the right coronary artery and a saphenous vein graft to the ramus intermedius branch. Not a candidate for single vessel CABG. Sees Dr. Rios.   · Ranolazine 500mg bid             DVT prophylaxis: Heparin  Code Status and Medical Interventions:   Ordered at: 12/12/21 9818     Level Of Support Discussed With:    Patient     Code Status (Patient has no pulse and is not breathing):    CPR (Attempt to Resuscitate)     Medical Interventions (Patient has pulse or is breathing):    Full Support       Plan for disposition:home or other placement in 1-3 days      Time: 15 minutes        Jerman Coffman MD   PGY-2    [facility]  [address] [place] [address]  Office: [phone]    This document has been electronically signed by Jerman Coffman MD on December 15, 2021 08:30 CST

## 2021-12-15 NOTE — NURSING NOTE
[facility] called to give report. Patient just finished dialysis. No complications noted. 2 liters pulled. Patient request to eat dinner before she left. Charge nurse notified.  No return call at this time.

## 2021-12-15 NOTE — PROGRESS NOTES
"[facility]  [address] [place] [address]. [address]  T - [phone]  F - [phone]     Progress Note          PATIENT  DEMOGRAPHICS   PATIENT NAME: Yamileth Slater                      PHYSICIAN: Ace Lauren MD  : 1960  MRN: [id number]   LOS: 2 days    Patient Care Team:  Rianna Macias MD as PCP - General (Family Medicine)  Subjective   SUBJECTIVE   Comfortable, no marked dyspnea.       Objective   OBJECTIVE   Vital Signs  Temp:  [97.9 °F (36.6 °C)-98.2 °F (36.8 °C)] 98 °F (36.7 °C)  Heart Rate:  [50-85] 63  Resp:  [18-20] 18  BP: (113-176)/(58-80) 113/62    Flowsheet Rows      First Filed Value   Admission Height 157.5 cm (62\") Documented at 2021   Admission Weight 131 kg (288 lb) Documented at 2021           I/O last 3 completed shifts:  In: 1184 [P.O.:920; I.V.:264]  Out: 2200 [Urine:2200]    PHYSICAL EXAM    Physical Exam  Constitutional:       Appearance: She is well-developed.   HENT:      Head: Normocephalic.   Eyes:      Pupils: Pupils are equal, round, and reactive to light.   Cardiovascular:      Rate and Rhythm: Normal rate and regular rhythm.      Heart sounds: Normal heart sounds.   Pulmonary:      Effort: Pulmonary effort is normal.      Breath sounds: Normal breath sounds.   Abdominal:      General: Bowel sounds are normal.      Palpations: Abdomen is soft.   Musculoskeletal:         General: No swelling.   Skin:     Coloration: Skin is not jaundiced.   Neurological:      General: No focal deficit present.      Mental Status: She is alert and oriented to person, place, and time.         RESULTS   Results Review:    Results from last 7 days   Lab Units 12/15/21  0548 21  1323 21  0916 21  0410 21  0410 21  1258 21  0242   SODIUM mmol/L 128* 130* 131*   < > 130*   < > 128*   POTASSIUM mmol/L 4.6 3.9 4.3   < > 4.4   < > 4.9   CHLORIDE mmol/L 92* 93* 94*   < > 94*   < > 94*   CO2 mmol/L 22.0 24.0 " 24.0   < > 26.0   < > 22.0   BUN mg/dL 70* 48* 45*   < > 43*   < > 51*   CREATININE mg/dL 3.92* 3.21* 3.32*   < > 2.98*   < > 3.23*   CALCIUM mg/dL 8.4* 8.6 8.5*   < > 8.8   < > 8.5*   BILIRUBIN mg/dL 0.2  --   --   --  0.2  --  0.2   ALK PHOS U/L 135*  --   --   --  140*  --  231*   ALT (SGPT) U/L 10  --   --   --  9  --  11   AST (SGOT) U/L 21  --   --   --  9  --  13   GLUCOSE mg/dL 287* 181* 197*   < > 79   < > 516*    < > = values in this interval not displayed.       Estimated Creatinine Clearance: 19.5 mL/min (A) (by C-G formula based on SCr of 3.92 mg/dL (H)).                Results from last 7 days   Lab Units 12/15/21  0548 12/14/21  0410 12/13/21  0242 12/12/21  2033   WBC 10*3/mm3 9.19 13.25* 8.88 8.69   HEMOGLOBIN g/dL 7.9* 7.9* 7.4* 7.7*   PLATELETS 10*3/mm3 205 199 192 240               Imaging Results (Last 24 Hours)     ** No results found for the last 24 hours. **           MEDICATIONS    aspirin, 81 mg, Oral, Daily  atorvastatin, 20 mg, Oral, Daily  [START ON 12/16/2021] bumetanide, 2 mg, Oral, Once per day on Sun Tue Thu Sat  cetirizine, 10 mg, Oral, Daily  clopidogrel, 75 mg, Oral, Daily  DULoxetine, 20 mg, Oral, Daily  epoetin hubert-epbx, 10,000 Units, Intravenous, Once per day on Mon Wed Fri  ferrous sulfate, 324 mg, Oral, Daily With Breakfast  gabapentin, 800 mg, Oral, Q8H  heparin (porcine), 5,000 Units, Subcutaneous, Q8H  insulin aspart, 0-24 Units, Subcutaneous, TID AC  insulin aspart, 40 Units, Subcutaneous, TID AC  insulin detemir, 30 Units, Subcutaneous, Nightly   And  insulin detemir, 30 Units, Subcutaneous, Nightly  ipratropium-albuterol, 3 mL, Nebulization, 4x Daily - RT  isosorbide mononitrate, 30 mg, Oral, QAM  levothyroxine, 25 mcg, Oral, Q AM  lisinopril, 5 mg, Oral, Daily  metoprolol tartrate, 50 mg, Oral, Q12H  montelukast, 10 mg, Oral, Nightly  nystatin, , Topical, TID  oxybutynin XL, 5 mg, Oral, Daily  pantoprazole, 40 mg, Oral, Nightly  ranolazine, 500 mg, Oral,  Q12H  sennosides-docusate, 2 tablet, Oral, BID  sodium chloride, 10 mL, Intravenous, Q12H           Assessment/Plan   ASSESSMENT / PLAN      Uncontrolled type 2 diabetes mellitus with complication, with long-term current use of insulin (HCC)    Essential hypertension    Dyslipidemia    Coronary artery disease    Hypothyroidism    Chronic respiratory failure with hypoxia (HCC)    CKD (chronic kidney disease), symptom management only, stage 5 (HCC)    MIKE and COPD overlap syndrome (HCC)    Anemia due to chronic kidney disease, on chronic dialysis (HCC)    Personal history of noncompliance with medical treatment, presenting hazards to health    Dyspnea       1.  ESRD on HD- Initiated HD on 10/21 due to hyperkalemia and fluid overload, now likely ESRD.  HD MWF for now. Keep bumex 2 mg on non dialysis days. Hd today     2.  Hyponatremia- Na stable close to 130     3.  Hyperglycemia- off insulin gtt glucose is better     4.  History of CAD     5.  History of COPD - On trilogy at night      6.  Anemia / recent GI bleed / b12 deficiency-  s/p capsule endoscopy which showed few small non-bleeding intestinal AVMs and single small polyp.      7. 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 HTN- Continue home antihypertensives. Low dose lisinopril 5mg daily     dispo dc today after dialysis           This document has been electronically signed by Ace Lauren MD on December 15, 2021 12:05 CST

## 2021-12-15 NOTE — PLAN OF CARE
Goal Outcome Evaluation:              Outcome Summary: pt resting well. no signs of distress. no complaints at this time. will continue to monitor

## 2021-12-16 ENCOUNTER — TRANSITIONAL CARE MANAGEMENT TELEPHONE ENCOUNTER (OUTPATIENT)
Dept: CALL CENTER | Facility: HOSPITAL | Age: 61
End: 2021-12-16

## 2021-12-16 ENCOUNTER — HOME CARE VISIT (OUTPATIENT)
Dept: HOME HEALTH SERVICES | Facility: CLINIC | Age: 61
End: 2021-12-16

## 2021-12-16 VITALS
OXYGEN SATURATION: 95 % | DIASTOLIC BLOOD PRESSURE: 80 MMHG | SYSTOLIC BLOOD PRESSURE: 128 MMHG | HEART RATE: 64 BPM | RESPIRATION RATE: 18 BRPM

## 2021-12-16 DIAGNOSIS — N18.4 CKD (CHRONIC KIDNEY DISEASE) STAGE 4, GFR 15-29 ML/MIN (HCC): Primary | ICD-10-CM

## 2021-12-16 LAB
GLUCOSE BLDC GLUCOMTR-MCNC: 288 MG/DL (ref 70–130)
GLUCOSE BLDC GLUCOMTR-MCNC: 436 MG/DL (ref 70–130)

## 2021-12-16 PROCEDURE — G0299 HHS/HOSPICE OF RN EA 15 MIN: HCPCS

## 2021-12-16 PROCEDURE — G0151 HHCP-SERV OF PT,EA 15 MIN: HCPCS

## 2021-12-16 NOTE — OUTREACH NOTE
Prep Survey      Responses   [facility] patient discharged from? [place]   Is LACE score < 7 ? No   Emergency Room discharge w/ pulse ox? No   Eligibility [facility]   Date of Admission 12/12/21   Date of Discharge 12/15/21   Discharge Disposition Home or Self Care   Discharge diagnosis Uncontrolled type 2 diabetes mellitus with complication,     ESRD on HD   Does the patient have one of the following disease processes/diagnoses(primary or secondary)? Other   Does the patient have Home health ordered? Yes   What is the Home health agency?  [facility] [place]   Is there a DME ordered? No   Prep survey completed? Yes          Danae Banerjee RN

## 2021-12-16 NOTE — DISCHARGE SUMMARY
DISCHARGE SUMMARY    PATIENT NAME: Yamileth Slater       PHYSICIAN: Jerman Coffman MD  : 1960  MRN: [id number]    ADMITTED: 2021     DISCHARGED: 12/15/21    ADMISSION DIAGNOSES:  Active Hospital Problems    Diagnosis  POA   • **Uncontrolled type 2 diabetes mellitus with complication, with long-term current use of insulin (HCC) [E11.8, E11.65, Z79.4]  Not Applicable   • Personal history of noncompliance with medical treatment, presenting hazards to health [Z91.19]  Not Applicable   • Anemia due to chronic kidney disease, on chronic dialysis (HCC) [N18.6, D63.1, Z99.2]  Not Applicable   • MIKE and COPD overlap syndrome (HCC) [G47.33, J44.9]  Yes   • CKD (chronic kidney disease), symptom management only, stage 5 (HCC) [N18.5]  Yes   • Chronic respiratory failure with hypoxia (HCC) [J96.11]  Yes   • Hypothyroidism [E03.9]  Yes   • Dyslipidemia [E78.5]  Yes   • Essential hypertension [I10]  Yes   • Coronary artery disease [I25.10]  Yes      Resolved Hospital Problems    Diagnosis Date Resolved POA   • Dyspnea [R06.00] 12/15/2021 Yes     DISCHARGE DIAGNOSES:   Active Hospital Problems    Diagnosis  POA   • **Uncontrolled type 2 diabetes mellitus with complication, with long-term current use of insulin (HCC) [E11.8, E11.65, Z79.4]  Not Applicable   • Personal history of noncompliance with medical treatment, presenting hazards to health [Z91.19]  Not Applicable   • Anemia due to chronic kidney disease, on chronic dialysis (HCC) [N18.6, D63.1, Z99.2]  Not Applicable   • MIKE and COPD overlap syndrome (HCC) [G47.33, J44.9]  Yes   • CKD (chronic kidney disease), symptom management only, stage 5 (HCC) [N18.5]  Yes   • Chronic respiratory failure with hypoxia (HCC) [J96.11]  Yes   • Hypothyroidism [E03.9]  Yes   • Dyslipidemia [E78.5]  Yes   • Essential hypertension [I10]  Yes   • Coronary artery disease [I25.10]  Yes      Resolved Hospital Problems    Diagnosis Date Resolved POA   • Dyspnea [R06.00]  12/15/2021 Yes       SERVICE: [facility]  Attending: Alex Wells MD  Resident: Jerman Coffman MD    CONSULTS:   Consult Orders (all) (From admission, onward)     Start     Ordered    12/13/21 0905  Inpatient Case Management  Consult  Once        Provider:  (Not yet assigned)    12/13/21 0904    12/13/21 0858  Inpatient Nephrology Consult  Once        Specialty:  Nephrology  Provider:  Ace Lauren MD    12/13/21 0858                PROCEDURES:   None    HISTORY OF PRESENT ILLNESS:     Yamileth Slater is a 61 y.o. 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 She denies any physical complaints and is due for dialysis tomorrow. She states that she has difficulty coming to her outpatient visits due to lack of transportation and recent dialysis schedule has complicated her being able to keep her outpatient appointments with PCP and specialists. (Dr. Mcneil, 12/12/21)    DIAGNOSTIC DATA:     Lab Results (last 7 days)     Procedure Component Value Units Date/Time    POC Glucose Once [[id number]]  (Abnormal) Collected: 12/15/21 1602    Specimen: Blood Updated: 12/15/21 1631     Glucose 292 mg/dL      Comment: RN NotifiedOperator: [id number] MERLE MEDLEYeter ID: [id number]       Comprehensive Metabolic Panel [[id number]]  (Abnormal) Collected: 12/15/21 0548    Specimen: Blood Updated: 12/15/21 0614     Glucose 287 mg/dL      BUN 70 mg/dL      Creatinine 3.92 mg/dL      Sodium 128 mmol/L      Potassium 4.6 mmol/L      Chloride 92 mmol/L      CO2 22.0 mmol/L      Calcium 8.4 mg/dL      Total Protein 6.9 g/dL      Albumin 3.30 g/dL      ALT (SGPT) 10 U/L      AST (SGOT) 21 U/L       Alkaline Phosphatase 135 U/L      Total Bilirubin 0.2 mg/dL      eGFR Non African Amer 12 mL/min/1.73      Comment: <15 Indicative of kidney failure.        eGFR   Amer --     Comment: <15 Indicative of kidney failure.        Globulin 3.6 gm/dL      A/G Ratio 0.9 g/dL      BUN/Creatinine Ratio 17.9     Anion Gap 14.0 mmol/L     Narrative:      GFR Normal >60  Chronic Kidney Disease <60  Kidney Failure <15      CBC Auto Differential [[id number]]  (Abnormal) Collected: 12/15/21 0548    Specimen: Blood Updated: 12/15/21 0606     WBC 9.19 10*3/mm3      RBC 2.80 10*6/mm3      Hemoglobin 7.9 g/dL      Hematocrit 24.9 %      MCV 88.9 fL      MCH 28.2 pg      MCHC 31.7 g/dL      RDW 17.2 %      RDW-SD 55.6 fl      MPV 9.0 fL      Platelets 205 10*3/mm3      Neutrophil % 53.2 %      Lymphocyte % 34.5 %      Monocyte % 7.1 %      Eosinophil % 2.8 %      Basophil % 0.9 %      Immature Grans % 1.5 %      Neutrophils, Absolute 4.89 10*3/mm3      Lymphocytes, Absolute 3.17 10*3/mm3      Monocytes, Absolute 0.65 10*3/mm3      Eosinophils, Absolute 0.26 10*3/mm3      Basophils, Absolute 0.08 10*3/mm3      Immature Grans, Absolute 0.14 10*3/mm3      nRBC 0.0 /100 WBC     POC Glucose Once [[id number]]  (Abnormal) Collected: 12/14/21 1928    Specimen: Blood Updated: 12/14/21 1941     Glucose 191 mg/dL      Comment: RN NotifiedOperator: [id number] ANTHONY ACELAMeter ID: [id number]       POC Glucose Once [[id number]]  (Normal) Collected: 12/14/21 1621    Specimen: Blood Updated: 12/14/21 1634     Glucose 78 mg/dL      Comment: RN NotifiedOperator: [id number] Delmont CRYSTALMeter ID: [id number]       Basic Metabolic Panel [[id number]]  (Abnormal) Collected: 12/14/21 1323    Specimen: Blood Updated: 12/14/21 1345     Glucose 181 mg/dL      BUN 48 mg/dL      Creatinine 3.21 mg/dL      Sodium 130 mmol/L      Potassium 3.9 mmol/L      Chloride 93 mmol/L      CO2 24.0 mmol/L      Calcium 8.6 mg/dL      eGFR Non African Amer 15  mL/min/1.73      BUN/Creatinine Ratio 15.0     Anion Gap 13.0 mmol/L     Narrative:      GFR Normal >60  Chronic Kidney Disease <60  Kidney Failure <15      POC Glucose Once [[id number]]  (Abnormal) Collected: 12/14/21 1049    Specimen: Blood Updated: 12/14/21 1148     Glucose 202 mg/dL      Comment: RN NotifiedOperator: [id number] DARIEN YANGNTIKAMeter ID: [id number]       POC Glucose Once [[id number]]  (Abnormal) Collected: 12/13/21 1513    Specimen: Blood Updated: 12/14/21 1106     Glucose 426 mg/dL      Comment: RN NotifiedOperator: [id number] TAYA PRATHERWNNEEMeter ID: [id number]       POC Glucose Once [[id number]]  (Abnormal) Collected: 12/13/21 1419    Specimen: Blood Updated: 12/14/21 1106     Glucose 503 mg/dL      Comment: : [id number] DE LA GARZA BREANNAMeter ID: [id number]       POC Glucose Once [[id number]]  (Abnormal) Collected: 12/13/21 1219    Specimen: Blood Updated: 12/14/21 1106     Glucose 574 mg/dL      Comment: RN NotifiedNotify DoctorOperator: [id number] WALLY SIEGELENMeter ID: [id number]       Extra Tubes [[id number]] Collected: 12/14/21 0916    Specimen: Blood, Venous Line Updated: 12/14/21 1030    Narrative:      The following orders were created for panel order Extra Tubes.  Procedure                               Abnormality         Status                     ---------                               -----------         ------                     Lavender Top[[id number]]                                     Final result                 Please view results for these tests on the individual orders.    Lavender Top [[id number]] Collected: 12/14/21 0916    Specimen: Blood Updated: 12/14/21 1030     Extra Tube hold for add-on     Comment: Auto resulted       Basic Metabolic Panel [[id number]]  (Abnormal) Collected: 12/14/21 0916    Specimen: Blood Updated: 12/14/21 1010     Glucose 197 mg/dL      BUN 45 mg/dL      Creatinine 3.32 mg/dL      Sodium 131 mmol/L      Potassium 4.3 mmol/L      Chloride 94  mmol/L      CO2 24.0 mmol/L      Calcium 8.5 mg/dL      eGFR   Amer --     Comment: <15 Indicative of kidney failure.        eGFR Non African Amer 14 mL/min/1.73      Comment: <15 Indicative of kidney failure.        BUN/Creatinine Ratio 13.6     Anion Gap 13.0 mmol/L     Narrative:      GFR Normal >60  Chronic Kidney Disease <60  Kidney Failure <15      POC Glucose Once [[id number]]  (Abnormal) Collected: 12/14/21 0122    Specimen: Blood Updated: 12/14/21 0950     Glucose 133 mg/dL      Comment: : [id number] MAG HEATHERMeter ID: [id number]       POC Glucose Once [[id number]]  (Abnormal) Collected: 12/14/21 0007    Specimen: Blood Updated: 12/14/21 0949     Glucose 203 mg/dL      Comment: : [id number] MAG HEATHERMeter ID: [id number]       POC Glucose Once [[id number]]  (Abnormal) Collected: 12/13/21 2319    Specimen: Blood Updated: 12/14/21 0949     Glucose 225 mg/dL      Comment: : [id number] MAG HEATHERMeter ID: [id number]       POC Glucose Once [[id number]]  (Abnormal) Collected: 12/13/21 2125    Specimen: Blood Updated: 12/14/21 0949     Glucose 297 mg/dL      Comment: : [id number] MAG HEATHERMeter ID: [id number]       POC Glucose Once [[id number]]  (Abnormal) Collected: 12/13/21 2029    Specimen: Blood Updated: 12/14/21 0949     Glucose 263 mg/dL      Comment: : [id number] MAG HEATHERMeter ID: [id number]       POC Glucose Once [[id number]]  (Normal) Collected: 12/14/21 0612    Specimen: Blood Updated: 12/14/21 0631     Glucose 92 mg/dL      Comment: : [id number] MAG HEATHERMeter ID: [id number]       POC Glucose Once [[id number]]  (Normal) Collected: 12/14/21 0530    Specimen: Blood Updated: 12/14/21 0631     Glucose 92 mg/dL      Comment: : [id number] MAG HEATHERMeter ID: [id number]       Comprehensive Metabolic Panel [[id number]]  (Abnormal) Collected: 12/14/21 0410    Specimen: Blood Updated: 12/14/21 0456      Glucose 79 mg/dL      BUN 43 mg/dL      Creatinine 2.98 mg/dL      Sodium 130 mmol/L      Potassium 4.4 mmol/L      Chloride 94 mmol/L      CO2 26.0 mmol/L      Calcium 8.8 mg/dL      Total Protein 7.7 g/dL      Albumin 3.70 g/dL      ALT (SGPT) 9 U/L      AST (SGOT) 9 U/L      Alkaline Phosphatase 140 U/L      Total Bilirubin 0.2 mg/dL      eGFR Non African Amer 16 mL/min/1.73      Globulin 4.0 gm/dL      A/G Ratio 0.9 g/dL      BUN/Creatinine Ratio 14.4     Anion Gap 10.0 mmol/L     Narrative:      GFR Normal >60  Chronic Kidney Disease <60  Kidney Failure <15      POC Glucose Once [[id number]]  (Normal) Collected: 12/14/21 0410    Specimen: Blood Updated: 12/14/21 0437     Glucose 81 mg/dL      Comment: : [id number] [employer] HEATHERMeter ID: [id number]       POC Glucose Once [[id number]]  (Normal) Collected: 12/14/21 0319    Specimen: Blood Updated: 12/14/21 0437     Glucose 86 mg/dL      Comment: : [id number] [employer] HEATHERMeter ID: [id number]       POC Glucose Once [[id number]]  (Normal) Collected: 12/14/21 0217    Specimen: Blood Updated: 12/14/21 0436     Glucose 111 mg/dL      Comment: : [id number] [employer] HEATHERMeter ID: [id number]       CBC Auto Differential [[id number]]  (Abnormal) Collected: 12/14/21 0410    Specimen: Blood Updated: 12/14/21 0426     WBC 13.25 10*3/mm3      RBC 2.80 10*6/mm3      Hemoglobin 7.9 g/dL      Hematocrit 24.4 %      MCV 87.1 fL      MCH 28.2 pg      MCHC 32.4 g/dL      RDW 17.4 %      RDW-SD 54.9 fl      MPV 9.2 fL      Platelets 199 10*3/mm3      Neutrophil % 68.5 %      Lymphocyte % 20.6 %      Monocyte % 9.1 %      Eosinophil % 0.3 %      Basophil % 0.3 %      Immature Grans % 1.2 %      Neutrophils, Absolute 9.08 10*3/mm3      Lymphocytes, Absolute 2.73 10*3/mm3      Monocytes, Absolute 1.20 10*3/mm3      Eosinophils, Absolute 0.04 10*3/mm3      Basophils, Absolute 0.04 10*3/mm3      Immature Grans, Absolute 0.16 10*3/mm3      nRBC 0.2 /100 WBC      Basic Metabolic Panel [[id number]]  (Abnormal) Collected: 12/14/21 0007    Specimen: Blood Updated: 12/14/21 0038     Glucose 201 mg/dL      BUN 40 mg/dL      Creatinine 2.98 mg/dL      Sodium 131 mmol/L      Potassium 3.7 mmol/L      Chloride 93 mmol/L      CO2 22.0 mmol/L      Calcium 8.6 mg/dL      eGFR Non African Amer 16 mL/min/1.73      BUN/Creatinine Ratio 13.4     Anion Gap 16.0 mmol/L     Narrative:      GFR Normal >60  Chronic Kidney Disease <60  Kidney Failure <15      POC Glucose Once [[id number]]  (Abnormal) Collected: 12/13/21 2227    Specimen: Blood Updated: 12/13/21 2301     Glucose 277 mg/dL      Comment: : [id number] [employer] PerformanceHERMeter ID: [id number]       Basic Metabolic Panel [[id number]]  (Abnormal) Collected: 12/13/21 2007    Specimen: Blood Updated: 12/13/21 2051     Glucose 241 mg/dL      BUN 35 mg/dL      Creatinine 2.73 mg/dL      Sodium 126 mmol/L      Potassium 3.8 mmol/L      Chloride 89 mmol/L      CO2 22.0 mmol/L      Calcium 8.6 mg/dL      eGFR Non African Amer 18 mL/min/1.73      BUN/Creatinine Ratio 12.8     Anion Gap 15.0 mmol/L     Narrative:      GFR Normal >60  Chronic Kidney Disease <60  Kidney Failure <15      POC Glucose Once [[id number]]  (Abnormal) Collected: 12/13/21 1939    Specimen: Blood Updated: 12/13/21 1952     Glucose 244 mg/dL      Comment: : [id number] [employer] 1 PerformanceHERMeter ID: [id number]       POC Glucose Once [[id number]]  (Abnormal) Collected: 12/13/21 1803    Specimen: Blood Updated: 12/13/21 1951     Glucose 227 mg/dL      Comment: RN NotifiedOperator: [id number] TAYA JUANEEMeter ID: [id number]       POC Glucose Once [[id number]]  (Abnormal) Collected: 12/13/21 1706    Specimen: Blood Updated: 12/13/21 1951     Glucose 287 mg/dL      Comment: RN NotifiedOperator: [id number] TAYA PRATHERWJACEKEEMeter ID: [id number]       POC Glucose Once [[id number]]  (Abnormal) Collected: 12/13/21 1617    Specimen: Blood Updated: 12/13/21 1951     Glucose 324  mg/dL      Comment: RN NotifiedOperator: [id number] TAYA PRATHERWNNEEMeter ID: [id number]       Extra Tubes [[id number]] Collected: 12/13/21 1416    Specimen: Blood, Venous Line Updated: 12/13/21 1531    Narrative:      The following orders were created for panel order Extra Tubes.  Procedure                               Abnormality         Status                     ---------                               -----------         ------                     Lavender Top[[id number]]                                     Final result               Lavender Top[[id number]]                                     Final result               Green Top (Gel)[[id number]]                                  Final result               Light Blue Top[[id number]]                                   Final result                 Please view results for these tests on the individual orders.    Light Blue Top [[id number]] Collected: 12/13/21 1416    Specimen: Blood Updated: 12/13/21 1531     Extra Tube hold for add-on     Comment: Auto resulted       Green Top (Gel) [[id number]] Collected: 12/13/21 1416    Specimen: Blood Updated: 12/13/21 1531     Extra Tube Hold for add-ons.     Comment: Auto resulted.       Lavender Top [[id number]] Collected: 12/13/21 1416    Specimen: Blood Updated: 12/13/21 1531     Extra Tube hold for add-on     Comment: Auto resulted       Lavender Top [[id number]] Collected: 12/13/21 1416    Specimen: Blood Updated: 12/13/21 1531     Extra Tube hold for add-on     Comment: Auto resulted       Hepatitis B Surface Antigen [[id number]]  (Normal) Collected: 12/13/21 1421    Specimen: Blood Updated: 12/13/21 1455     Hepatitis B Surface Ag Non-Reactive    Basic Metabolic Panel [[id number]]  (Abnormal) Collected: 12/13/21 1258    Specimen: Blood Updated: 12/13/21 1326     Glucose 565 mg/dL      BUN 57 mg/dL      Creatinine 3.38 mg/dL      Sodium 124 mmol/L      Potassium 5.2 mmol/L      Chloride 90 mmol/L      CO2 20.0 mmol/L      Calcium  8.6 mg/dL      eGFR   Amer --     Comment: <15 Indicative of kidney failure.        eGFR Non African Amer 14 mL/min/1.73      Comment: <15 Indicative of kidney failure.        BUN/Creatinine Ratio 16.9     Anion Gap 14.0 mmol/L     Narrative:      GFR Normal >60  Chronic Kidney Disease <60  Kidney Failure <15      POC Glucose Once [[id number]]  (Abnormal) Collected: 12/13/21 1126    Specimen: Blood Updated: 12/13/21 1209     Glucose 572 mg/dL      Comment: RN NotifiedNotify DoctorOperator: [id number] WALLY SIEGELENMeter ID: [id number]       POC Glucose Once [[id number]]  (Abnormal) Collected: 12/13/21 1030    Specimen: Blood Updated: 12/13/21 1208     Glucose 518 mg/dL      Comment: Result Not ConfirmedOperator: [id number] RAMBO USMANdamari ID: [id number]       POC Glucose Once [[id number]]  (Abnormal) Collected: 12/13/21 0940    Specimen: Blood Updated: 12/13/21 1208     Glucose 552 mg/dL      Comment: RN NotifiedNotify DoctorOperator: [id number] WALLY SIEGELENMeter ID: [id number]       POC Glucose Once [[id number]]  (Abnormal) Collected: 12/13/21 0752    Specimen: Blood Updated: 12/13/21 1208     Glucose 521 mg/dL      Comment: RN NotifiedNotify DoctorOperator: [id number] WALLY SIEGELENMeter ID: [id number]       POC Glucose Once [[id number]]  (Abnormal) Collected: 12/13/21 0602    Specimen: Blood Updated: 12/13/21 1208     Glucose 530 mg/dL      Comment: Notify DoctorOperator: [id number] MARITZA KATIEMeter ID: [id number]       POC Glucose Once [[id number]]  (Abnormal) Collected: 12/12/21 2303    Specimen: Blood Updated: 12/13/21 1206     Glucose 470 mg/dL      Comment: Notify DoctorOperator: [id number] MARITZA KATIEMeter ID: [id number]       CBC Auto Differential [[id number]]  (Abnormal) Collected: 12/13/21 0242    Specimen: Blood Updated: 12/13/21 0316     WBC 8.88 10*3/mm3      RBC 2.62 10*6/mm3      Hemoglobin 7.4 g/dL      Hematocrit 23.3 %      MCV 88.9 fL      MCH 28.2 pg      MCHC 31.8 g/dL      RDW  17.2 %      RDW-SD 54.5 fl      MPV 8.8 fL      Platelets 192 10*3/mm3      Neutrophil % 85.3 %      Lymphocyte % 10.7 %      Monocyte % 1.5 %      Eosinophil % 0.5 %      Basophil % 0.6 %      Immature Grans % 1.4 %      Neutrophils, Absolute 7.59 10*3/mm3      Lymphocytes, Absolute 0.95 10*3/mm3      Monocytes, Absolute 0.13 10*3/mm3      Eosinophils, Absolute 0.04 10*3/mm3      Basophils, Absolute 0.05 10*3/mm3      Immature Grans, Absolute 0.12 10*3/mm3      nRBC 0.0 /100 WBC     Comprehensive Metabolic Panel [[id number]]  (Abnormal) Collected: 12/13/21 0242    Specimen: Blood Updated: 12/13/21 0311     Glucose 516 mg/dL      BUN 51 mg/dL      Creatinine 3.23 mg/dL      Sodium 128 mmol/L      Potassium 4.9 mmol/L      Chloride 94 mmol/L      CO2 22.0 mmol/L      Calcium 8.5 mg/dL      Total Protein 7.6 g/dL      Albumin 3.60 g/dL      ALT (SGPT) 11 U/L      AST (SGOT) 13 U/L      Alkaline Phosphatase 231 U/L      Total Bilirubin 0.2 mg/dL      eGFR Non African Amer 15 mL/min/1.73      Globulin 4.0 gm/dL      A/G Ratio 0.9 g/dL      BUN/Creatinine Ratio 15.8     Anion Gap 12.0 mmol/L     Narrative:      GFR Normal >60  Chronic Kidney Disease <60  Kidney Failure <15      Troponin [[id number]]  (Normal) Collected: 12/13/21 0242    Specimen: Blood Updated: 12/13/21 0308     Troponin T 0.023 ng/mL     Narrative:      Troponin T Reference Range:  <= 0.03 ng/mL-   Negative for AMI  >0.03 ng/mL-     Abnormal for myocardial necrosis.  Clinicians would have to utilize clinical acumen, EKG, Troponin and serial changes to determine if it is an Acute Myocardial Infarction or myocardial injury due to an underlying chronic condition.       Results may be falsely decreased if patient taking Biotin.      Troponin [[id number]]  (Normal) Collected: 12/13/21 0012    Specimen: Blood Updated: 12/13/21 0035     Troponin T 0.025 ng/mL     Narrative:      Troponin T Reference Range:  <= 0.03 ng/mL-   Negative for AMI  >0.03 ng/mL-      Abnormal for myocardial necrosis.  Clinicians would have to utilize clinical acumen, EKG, Troponin and serial changes to determine if it is an Acute Myocardial Infarction or myocardial injury due to an underlying chronic condition.       Results may be falsely decreased if patient taking Biotin.      COVID-19 and FLU A/B PCR - Swab, Nasopharynx [[id number]]  (Normal) Collected: 12/12/21 2137    Specimen: Swab from Nasopharynx Updated: 12/12/21 2203     COVID19 Not Detected     Influenza A PCR Not Detected     Influenza B PCR Not Detected    Narrative:      Fact sheet for providers: https://www.fda.gov/media/441099/download    Fact sheet for patients: https://www.fda.gov/media/543299/download    Test performed by PCR.    Extra Tubes [[id number]] Collected: 12/12/21 2033    Specimen: Blood, Venous Line Updated: 12/12/21 2145    Narrative:      The following orders were created for panel order Extra Tubes.  Procedure                               Abnormality         Status                     ---------                               -----------         ------                     Gold Top - SST[[id number]]                                   Final result                 Please view results for these tests on the individual orders.    Gold Top - SST [[id number]] Collected: 12/12/21 2033    Specimen: Blood Updated: 12/12/21 2145     Extra Tube Hold for add-ons.     Comment: Auto resulted.       Manual Differential [[id number]]  (Abnormal) Collected: 12/12/21 2033    Specimen: Blood Updated: 12/12/21 2115     Neutrophil % 67.0 %      Lymphocyte % 18.0 %      Monocyte % 2.0 %      Eosinophil % 10.0 %      Basophil % 3.0 %      Neutrophils Absolute 5.82 10*3/mm3      Lymphocytes Absolute 1.56 10*3/mm3      Monocytes Absolute 0.17 10*3/mm3      Eosinophils Absolute 0.87 10*3/mm3      Basophils Absolute 0.26 10*3/mm3      Anisocytosis Mod/2+     Polychromasia Mod/2+     WBC Morphology Normal     Platelet Estimate Adequate    Troponin  [[id number]]  (Abnormal) Collected: 12/12/21 2033    Specimen: Blood Updated: 12/12/21 2106     Troponin T 0.032 ng/mL     Narrative:      Troponin T Reference Range:  <= 0.03 ng/mL-   Negative for AMI  >0.03 ng/mL-     Abnormal for myocardial necrosis.  Clinicians would have to utilize clinical acumen, EKG, Troponin and serial changes to determine if it is an Acute Myocardial Infarction or myocardial injury due to an underlying chronic condition.       Results may be falsely decreased if patient taking Biotin.      Comprehensive Metabolic Panel [[id number]]  (Abnormal) Collected: 12/12/21 2033    Specimen: Blood Updated: 12/12/21 2105     Glucose 502 mg/dL      BUN 50 mg/dL      Creatinine 3.23 mg/dL      Sodium 128 mmol/L      Potassium 4.1 mmol/L      Chloride 92 mmol/L      CO2 26.0 mmol/L      Calcium 8.7 mg/dL      Total Protein 7.7 g/dL      Albumin 3.70 g/dL      ALT (SGPT) 10 U/L      AST (SGOT) 14 U/L      Alkaline Phosphatase 215 U/L      Total Bilirubin 0.2 mg/dL      eGFR Non African Amer 15 mL/min/1.73      Globulin 4.0 gm/dL      A/G Ratio 0.9 g/dL      BUN/Creatinine Ratio 15.5     Anion Gap 10.0 mmol/L     Narrative:      GFR Normal >60  Chronic Kidney Disease <60  Kidney Failure <15      BNP [[id number]]  (Abnormal) Collected: 12/12/21 2033    Specimen: Blood Updated: 12/12/21 2059     proBNP 4,156.0 pg/mL     Narrative:      Among patients with dyspnea, NT-proBNP is highly sensitive for the detection of acute congestive heart failure. In addition NT-proBNP of <300 pg/ml effectively rules out acute congestive heart failure with 99% negative predictive value.    Results may be falsely decreased if patient taking Biotin.      CBC & Differential [[id number]]  (Abnormal) Collected: 12/12/21 2033    Specimen: Blood Updated: 12/12/21 2047    Narrative:      The following orders were created for panel order CBC & Differential.  Procedure                               Abnormality         Status                      ---------                               -----------         ------                     CBC Auto Differential[[id number]]        Abnormal            Final result                 Please view results for these tests on the individual orders.    CBC Auto Differential [[id number]]  (Abnormal) Collected: 12/12/21 2033    Specimen: Blood Updated: 12/12/21 2047     WBC 8.69 10*3/mm3      RBC 2.73 10*6/mm3      Hemoglobin 7.7 g/dL      Hematocrit 24.4 %      MCV 89.4 fL      MCH 28.2 pg      MCHC 31.6 g/dL      RDW 17.6 %      RDW-SD 55.8 fl      MPV 9.1 fL      Platelets 240 10*3/mm3         XR Chest 1 View    Result Date: 12/12/2021  EXAM:   XR Chest, 1 View FACILITY:   [facility] REFERRING:   ROXY DIAS CLINICAL HISTORY:   61 years, Female; dyspnea TECHNIQUE:   Frontal view of the chest. COMPARISON:   November 8, 2021 FINDINGS:   Lungs:  Airspace and interstitial infiltrates seen within the right lung.   Pleural space:  Right pleural effusion.       No pneumothorax.   Heart:  Stable cardiac silhouette.   Mediastinum:  No acute pathology   Bones/joints:  Midline sternotomy wires.   Tubes, lines and devices:  Dual-lumen central venous catheter again noted in similar position.       Airspace and interstitial infiltrates seen within the right lung. Right pleural effusion. Electronically signed by:  Radha De Leon DO  12/12/2021 8:58 PM CST Workstation: [id number]     HOSPITAL COURSE:    patient was admitted for chronic respiratory failure. Patient's home lost power. She was unable to use her trilogy machine. She is reportedly on 3 L at home. During this hospital admission she was on room air and maintained good oxygen saturations. Chest x-ray was consistent with previous. She was able to use the trilogy machine overnight. Patient received two rounds of dialysis while in the hospital. She had some hyponatremia that was corrected at dialysis. Patient was also anemic but close to her baseline. Nephrology  was on board and helping to manage this as well. We had difficulty controlling her blood sugar initially on this admission. Ultimately she got put on an insulin drip until we are able to transition over to her home regimen. There is some difficulty with patient maintaining consistent carb diet and restricting her fluids. Her blood sugar was controlled prior to discharge. She was encouraged to stick to her fluid restriction and limit caloric intake. We discussed with case management about where she could possibly go as she still did not have power. Her daughter high-power and she was having to stay there until power returned at her home. She had her trilogy with her in the hospital and was taking it upon discharge. Discussed importance of keeping follow-up with dialysis. Asked patient to follow-up with her PCP. She felt comfortable being discharged home with home health. Case management discussed that home health can still go to her daughters location if need be.    DISCHARGE CONDITION:   good    DISPOSITION:  Home or Self Care    DISCHARGE MEDICATIONS     Discharge Medications      Changes to Medications      Instructions Start Date   bumetanide 2 MG tablet  Commonly known as: BUMEX  What changed:   · medication strength  · how much to take  · when to take this  · Another medication with the same name was removed. Continue taking this medication, and follow the directions you see here.  Notes to patient: As directed   2 mg, Oral, 4 Times Weekly         Continue These Medications      Instructions Start Date   acetaminophen 650 MG 8 hr tablet  Commonly known as: Tylenol 8 Hour  Notes to patient: As needed    650 mg, Oral, Every 8 Hours PRN      Adult Aspirin Regimen 81 MG EC tablet  Generic drug: aspirin   81 mg, Oral, Daily      albuterol sulfate  (90 Base) MCG/ACT inhaler  Commonly known as: PROVENTIL HFA;VENTOLIN HFA;PROAIR HFA  Notes to patient: As needed   2 puffs, Inhalation, Every 4 Hours PRN       albuterol (2.5 MG/3ML) 0.083% nebulizer solution  Commonly known as: PROVENTIL  Notes to patient: As needed   Inhale the contents of 1 vial by nebulization Every 4 (Four) Hours As Needed for Wheezing.      atorvastatin 20 MG tablet  Commonly known as: LIPITOR  Notes to patient: 12/15/2021   TAKE ONE TABLET BY MOUTH EVERY NIGHT AT BEDTIME      cetirizine 10 MG tablet  Commonly known as: zyrTEC  Notes to patient: 12/16/2021   10 mg, Oral, Daily      clopidogrel 75 MG tablet  Commonly known as: PLAVIX  Notes to patient: 12/16/2021   75 mg, Oral, Daily      [employer] Blood Glucose Meter w/Device kit   1 each, Does not apply, 3 Times Daily      cyclobenzaprine 10 MG tablet  Commonly known as: FLEXERIL  Notes to patient: 12/15/2021   10 mg, Oral, 2 Times Daily PRN      DULoxetine 20 MG capsule  Commonly known as: CYMBALTA  Notes to patient: 12/16/2021   20 mg, Oral, Daily      Easy Touch Pen Needles 31G X 8 MM misc  Generic drug: Insulin Pen Needle   No dose, route, or frequency recorded.      NovoFine 30G X 8 MM misc  Generic drug: Insulin Pen Needle  Notes to patient: 12/15/2021   As directed 4 times daily      Insulin Pen Needle 31G X 8 MM misc  Notes to patient: 12/15/2021   Use to inject insulin 4 (Four) Times a Day as directed.      epoetin hubert-epbx 44984 UNIT/ML injection  Commonly known as: RETACRIT  Notes to patient: On dialysis days   10,000 Units, Subcutaneous, 3 Times Weekly      ferrous sulfate 325 (65 FE) MG tablet  Commonly known as: FeroSul  Notes to patient: 12/16/2021   325 mg, Oral, Daily With Breakfast      fluticasone 50 MCG/ACT nasal spray  Commonly known as: FLONASE  Notes to patient: 12/16/2021   2 sprays, Each Nare, Daily      FreeStyle Jade 2 [place] device   1 each, Does not apply, Continuous      FreeStyle Jade 2 Sensor misc   1 each, Does not apply, Every 14 Days      gabapentin 800 MG tablet  Commonly known as: NEURONTIN  Notes to patient: 12/15/2021   800 mg, Oral, 3 Times Daily      glucose  blood test strip   Use as instructed      insulin aspart 100 UNIT/ML solution pen-injector sc pen  Commonly known as: novoLOG FLEXPEN  Notes to patient: 12/15/2021   30 Units, Subcutaneous, 3 Times Daily With Meals      insulin detemir 100 UNIT/ML injection  Commonly known as: LEVEMIR  Notes to patient: 12/16/2021   60 Units, Subcutaneous, Daily      ipratropium-albuterol 0.5-2.5 mg/3 ml nebulizer  Commonly known as: DUO-NEB  Notes to patient: 12/15/2021   3 mL, Nebulization, 4 Times Daily - RT      isosorbide mononitrate 30 MG 24 hr tablet  Commonly known as: IMDUR  Notes to patient: 12/16/2021   30 mg, Oral, Every Morning      levothyroxine 25 MCG tablet  Commonly known as: SYNTHROID, LEVOTHROID  Notes to patient: 12/16/2021   25 mcg, Oral, Daily      lidocaine 5 %  Commonly known as: LIDODERM  Notes to patient: 12/15/2021   APPLY TO THE AFFECTED AREA(S) TOPICALLY TWO TIMES A DAY. REMOVE NIGHTLY      lisinopril 5 MG tablet  Commonly known as: PRINIVIL,ZESTRIL  Notes to patient: 12/16/2021   5 mg, Oral, Daily      metoprolol tartrate 50 MG tablet  Commonly known as: LOPRESSOR  Notes to patient: 12/15/2021   TAKE ONE TABLET BY MOUTH TWO TIMES A DAY. HOLD IF PULSE IS LESS THAN 60      montelukast 10 MG tablet  Commonly known as: SINGULAIR  Notes to patient: 12/15/2021   10 mg, Oral, Nightly      nitroglycerin 0.4 MG SL tablet  Commonly known as: NITROSTAT  Notes to patient: As needed   0.4 mg, Sublingual, Every 5 Minutes PRN      nystatin 789835 UNIT/GM powder  Commonly known as: MYCOSTATIN  Notes to patient: 12/15/2021   Topical, 3 Times Daily      O2  Commonly known as: OXYGEN   3 L/min, Inhalation, Continuous      ondansetron ODT 4 MG disintegrating tablet  Commonly known as: Zofran ODT  Notes to patient: As needed   4 mg, Translingual, Every 8 Hours PRN      oxybutynin XL 5 MG 24 hr tablet  Commonly known as: DITROPAN-XL  Notes to patient: 12/16/2021   5 mg, Oral, Daily      pantoprazole 40 MG EC tablet  Commonly  known as: PROTONIX  Notes to patient: 12/15/2021   40 mg, Oral, Nightly      promethazine 25 MG tablet  Commonly known as: PHENERGAN  Notes to patient: As needed   25 mg, Oral, Every 6 Hours PRN      ranolazine 500 MG 12 hr tablet  Commonly known as: RANEXA  Notes to patient: 12/15/2021   500 mg, Oral, Every 12 Hours Scheduled      sennosides-docusate 8.6-50 MG per tablet  Commonly known as: PERICOLACE  Notes to patient: 12/15/2021   2 tablets, Oral, 2 Times Daily             INSTRUCTIONS:  Activity:   Activity Instructions     Activity as Tolerated          Diet:   Diet Instructions     Diet: Consistent Carbohydrate, Renal, Cardiac      Discharge Diet:  Consistent Carbohydrate  Renal  Cardiac       Fluid Restriction per day: 1000 mL Fluid          FOLLOW UP:   Additional Instructions for the Follow-ups that You Need to Schedule     Ambulatory Referral to Home Health   As directed      Face to Face Visit Date: 12/15/2021    Follow-up provider for Plan of Care?: I treated the patient in an acute care facility and will not continue treatment after discharge.    Follow-up provider: RIANNA QUINN [[id number]]    Reason/Clinical Findings: CKD stage 5, dialysis patient, debility 2/2 obesity    Describe mobility limitations that make leaving home difficult: decreased ability to ambulate 2/2 respiratory status and obesity    Nursing/Therapeutic Services Requested: Skilled Nursing Physical Therapy Occupational Therapy    Skilled nursing orders: CHF management    PT orders: Therapeutic exercise    Occupational orders: Activities of daily living    Frequency: 1 Week 1         Discharge Follow-up with PCP   As directed       Currently Documented PCP:    Rianna Quinn MD    PCP Phone Number:    [phone]     Follow Up Details: Please follow up with your PCP the end of this week or early next week            Contact information for follow-up providers     Rianna Quinn MD .    Specialty: Family Medicine  Why: Please follow  up with your PCP the end of this week or early next week  Contact information:  [address] [place] [facility] [phone]             Rianna Macias MD .    Specialty: Family Medicine  Contact information:  [address] [place] [address] [phone]                   Contact information for after-discharge care     Home Medical Care     [facility] .    Service: Home Health Services  Contact information:  [address] [place] [address] [phone]                             PENDING TEST RESULTS AT DISCHARGE  Pending Labs     Order Current Status    Blood Culture - Blood, Arm, Right Preliminary result    Blood Culture - Blood, Wrist, Right Preliminary result          Time: >30 minutes were spent in discharge planning, medication reconciliation and coordination of care for this patient.    Alex Wells MD is the attending at time of discharge, He is aware of the patient's status and agrees with the above discharge summary.       Jerman Coffman MD   PGY-2    [facility]  [address], [place] [id number]  Office: [phone]    This document has been electronically signed by Jerman Coffman MD on December 16, 2021 12:05 CST

## 2021-12-16 NOTE — HOME HEALTH
PATIENT INITIALLY PRESENTED TO THE ER SECONDARY TO HAVING NO POWER AND SHE HAD NO CHARGE FOR HER TRILOGY MACHINE AND IS ON CONSTANT OXYGEN AND NEEDED SUPPLEMNTATION. PATIENT STATES ONCE SHE ARRIVED AT THE HOSPITAL THEY FOUND MULTIPLE ISSUES WITH HER WHEN SHE WAS IN THE HOSPITAL. WHILE HOSPITALIZED SHE WAS FOUND TO HAVE ANEMIA, RESPIRATORY FAILURE, UNCONTROLLED DIABETES. SHE STATES SHE IS FEELING BETTER SINCE COMING HOME.    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

## 2021-12-16 NOTE — OUTREACH NOTE
Call Center TCM Note      Responses   [facility] patient discharged from? [place]   Does the patient have one of the following disease processes/diagnoses(primary or secondary)? Other   TCM attempt successful? No   Unsuccessful attempts Attempt 1  [call to pt and call to Yamileth dtr--Yamileth communicated that it is best to attempt contact with patient]   Comments regarding PCP Hospital PCP FOLLOW UP APPOINTMENT IS 12/20/21@1100am          Roma Lopez RN    12/16/2021, 14:25 EST

## 2021-12-16 NOTE — PAYOR COMM NOTE
"Komal Rodarte  Case Management Extender  [phone] phone  [phone] fax    Auth# [id number]    Yamileth Slater (61 y.o. Female)             Date of Birth Social Security Number Address Home Phone MRN    1960  [address]  [place] [address] [phone] [id number]    Zoroastrianism Marital Status             None        Admission Date Admission Type Admitting Provider Attending Provider Department, Room/Bed    12/12/21 Emergency Alex Wells MD  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                 Active Insurance as of 12/12/2021     Primary Coverage     Payor Plan Insurance Group Employer/Plan Group    HUMANA MEDICARE REPLACEMENT HUMANA MEDICARE REPLACEMENT [id number]     Payor Plan Address Payor Plan Phone Number Payor Plan Fax Number Effective Dates    [address] [address] [phone]  7/1/2020 - None Entered    [facility] [address]       Subscriber Name Subscriber Birth Date Member ID       YAMILETH SLATER 1960 [id number]           Secondary Coverage     Payor Plan Insurance Group Employer/Plan Group    [place]      Payor Plan Address Payor Plan Phone Number Payor Plan Fax Number Effective Dates    [address] [phone]  6/28/2019 - None Entered    [facility] [id number]       Subscriber Name Subscriber Birth Date Member ID       YAMILETH SLATER 1960 [id number]                 Emergency Contacts     Contact " Person (Rel.) Home Phone Work Phone Mobile Phone    Yamileth Chavez (Daughter) [phone] -- [phone]    Suzanne Rivera (Daughter) [phone] -- [phone]    BryonHuy (Spouse) [phone] -- [phone]            Discharge Summary    No notes of this type exist for this encounter.         Discharge Order (From admission, onward)     Start     Ordered    12/15/21 1253  Discharge patient  Once        Expected Discharge Date: 12/15/21    Expected Discharge Time: Afternoon    Discharge Disposition: Home or Self Care    Physician of Record for Attribution - Please select from Treatment Team: RAFAT SALCDEO [[id number]]    Review needed by CMO to determine Physician of Record: No       Question Answer Comment   Physician of Record for Attribution - Please select from Treatment Team RAFAT SALCEDO    Review needed by CMO to determine Physician of Record No        12/15/21 4874

## 2021-12-16 NOTE — OUTREACH NOTE
Call Center TCM Note      Responses   [facility] patient discharged from? [place]   Does the patient have one of the following disease processes/diagnoses(primary or secondary)? Other   TCM attempt successful? No   Unsuccessful attempts Attempt 2          Roma Lopez RN    12/16/2021, 15:06 EST

## 2021-12-17 ENCOUNTER — TRANSITIONAL CARE MANAGEMENT TELEPHONE ENCOUNTER (OUTPATIENT)
Dept: CALL CENTER | Facility: HOSPITAL | Age: 61
End: 2021-12-17

## 2021-12-17 VITALS
HEART RATE: 71 BPM | SYSTOLIC BLOOD PRESSURE: 128 MMHG | BODY MASS INDEX: 53.55 KG/M2 | DIASTOLIC BLOOD PRESSURE: 80 MMHG | HEIGHT: 62 IN | OXYGEN SATURATION: 96 % | RESPIRATION RATE: 22 BRPM | WEIGHT: 291 LBS

## 2021-12-17 LAB
BACTERIA SPEC AEROBE CULT: NORMAL
BACTERIA SPEC AEROBE CULT: NORMAL

## 2021-12-17 NOTE — OUTREACH NOTE
Call Center TCM Note      Responses   [facility] patient discharged from? [place]   Does the patient have one of the following disease processes/diagnoses(primary or secondary)? Other   TCM attempt successful? Yes   Call start time 1105   Revoked Reason Patient/Family Refused  [hung up]   Call end time 1106   Discharge diagnosis Uncontrolled type 2 diabetes mellitus with complication,     ESRD on HD          Noah Green RN    12/17/2021, 11:06 EST

## 2021-12-19 ENCOUNTER — HOME CARE VISIT (OUTPATIENT)
Dept: HOME HEALTH SERVICES | Facility: CLINIC | Age: 61
End: 2021-12-19

## 2021-12-19 VITALS
HEART RATE: 62 BPM | DIASTOLIC BLOOD PRESSURE: 82 MMHG | WEIGHT: 293 LBS | RESPIRATION RATE: 18 BRPM | TEMPERATURE: 98 F | BODY MASS INDEX: 53.92 KG/M2 | SYSTOLIC BLOOD PRESSURE: 162 MMHG | HEIGHT: 62 IN | OXYGEN SATURATION: 99 %

## 2021-12-19 PROCEDURE — G0299 HHS/HOSPICE OF RN EA 15 MIN: HCPCS

## 2021-12-21 ENCOUNTER — HOME CARE VISIT (OUTPATIENT)
Dept: HOME HEALTH SERVICES | Facility: CLINIC | Age: 61
End: 2021-12-21

## 2021-12-21 VITALS
RESPIRATION RATE: 18 BRPM | SYSTOLIC BLOOD PRESSURE: 132 MMHG | DIASTOLIC BLOOD PRESSURE: 76 MMHG | OXYGEN SATURATION: 94 % | HEART RATE: 76 BPM | TEMPERATURE: 48.2 F

## 2021-12-21 PROCEDURE — G0152 HHCP-SERV OF OT,EA 15 MIN: HCPCS

## 2021-12-21 NOTE — HOME HEALTH
DIALYSIS CURRENTLY  MON, WED, FRIDAY     REASON FOR REFERRAL: 62 y/o female hospitalized a [facility] 10/11/21-10/28/21 for GI bleed and s/p transfusion. She transferred to [facility] 10/28/21-11/8/21 for rehab services then transferred back to [facility] 11/8/21-11/15/21 with complaints of chest pain and was treated for penumonia, acute on chronic CHF, and s/p cardiac stenting.  She received a course of OT and regained her strength and increased ADL independence.  She then was readmitted to [facility] 12/12/21 with cmplaints of no electricity for O2/trilogy and was treated for hyponamtremia, chronic respiratory failure with hypoxia.  Patient reported due to her dialysis schedule and just finished OT course prior to most recent hospitalization, she would like to just participate with PT at this time.  She reported that she will continue to complete her B UE HEP as previously instructed by OT staff.    PMH: CAD, DM, CKD, CHF, COPD, HTN, HLD, GERD, PVD, anemia, chronic respiratory failure-3L per NC, anxiety, neuropathy, substance abuse, carotid stenting, CABG x3.     PLOF: PRN assistance for ADLs from spouse, independent with functional mobility without AD inside apartment, and SB(A) for mobility using rollator walker outside of home.     HOME ENVIRONMENT: Patient lives in one level apartment with spouse with no steps to enter.    PRECAUTIONS: monitor O2 with activity    MD APPTS: 1/5/22 Dr Pagan    DME: Oxygen equipment, rollator walker, shower chair in tub/shower combo with curtain, hand held shower, handicapped height toilet, grab bar in shower, trilogy     AROM B UES: WFL STRENGTH B UES: 4/5 grossly throughout.     HAND DOMINANCE: R hand dominant, B  strengths: 4-/5. Patient with complaints of decreased sensation B Hands at B fingers.     REHAB POTENTIAL: Good for Goals     WISH TO ADDRESS BATH/DRESSING WITH OT: NO     PATIENT'S GOAL: None stated

## 2021-12-22 ENCOUNTER — READMISSION MANAGEMENT (OUTPATIENT)
Dept: CALL CENTER | Facility: HOSPITAL | Age: 61
End: 2021-12-22

## 2021-12-22 NOTE — OUTREACH NOTE
Medical Week 2 Survey      Responses   [facility] patient discharged from? [place]   Does the patient have one of the following disease processes/diagnoses(primary or secondary)? Other   Week 2 attempt successful? No   Unsuccessful attempts Attempt 2          Sarah Perez RN

## 2021-12-23 ENCOUNTER — HOME CARE VISIT (OUTPATIENT)
Dept: HOME HEALTH SERVICES | Facility: CLINIC | Age: 61
End: 2021-12-23

## 2021-12-23 ENCOUNTER — TELEPHONE (OUTPATIENT)
Dept: FAMILY MEDICINE CLINIC | Facility: CLINIC | Age: 61
End: 2021-12-23

## 2021-12-23 VITALS
RESPIRATION RATE: 18 BRPM | DIASTOLIC BLOOD PRESSURE: 80 MMHG | HEART RATE: 60 BPM | SYSTOLIC BLOOD PRESSURE: 132 MMHG | OXYGEN SATURATION: 96 % | TEMPERATURE: 97.2 F

## 2021-12-23 PROCEDURE — G0157 HHC PT ASSISTANT EA 15: HCPCS

## 2021-12-24 NOTE — HOME HEALTH
pt up amb. in home mod w/ small dog in hands and reports just waking up and needs a cup of coffee before beginning exs.; pt reports still adjusting to increased fatigue from dialysis 3 times per week

## 2021-12-27 ENCOUNTER — APPOINTMENT (OUTPATIENT)
Dept: GENERAL RADIOLOGY | Facility: HOSPITAL | Age: 61
End: 2021-12-27

## 2021-12-27 ENCOUNTER — READMISSION MANAGEMENT (OUTPATIENT)
Dept: CALL CENTER | Facility: HOSPITAL | Age: 61
End: 2021-12-27

## 2021-12-27 ENCOUNTER — HOSPITAL ENCOUNTER (OUTPATIENT)
Facility: HOSPITAL | Age: 61
Setting detail: OBSERVATION
Discharge: HOME | End: 2021-12-27
Attending: EMERGENCY MEDICINE | Admitting: FAMILY MEDICINE

## 2021-12-27 VITALS
BODY MASS INDEX: 51.89 KG/M2 | OXYGEN SATURATION: 98 % | SYSTOLIC BLOOD PRESSURE: 118 MMHG | DIASTOLIC BLOOD PRESSURE: 58 MMHG | HEART RATE: 69 BPM | HEIGHT: 62 IN | WEIGHT: 282 LBS | TEMPERATURE: 98.1 F | RESPIRATION RATE: 20 BRPM

## 2021-12-27 DIAGNOSIS — N18.6 ESRD (END STAGE RENAL DISEASE) (HCC): ICD-10-CM

## 2021-12-27 DIAGNOSIS — J81.0 ACUTE PULMONARY EDEMA (HCC): ICD-10-CM

## 2021-12-27 DIAGNOSIS — R53.1 WEAKNESS: Primary | ICD-10-CM

## 2021-12-27 DIAGNOSIS — J90 PLEURAL EFFUSION, RIGHT: ICD-10-CM

## 2021-12-27 DIAGNOSIS — E11.65 TYPE 2 DIABETES MELLITUS WITH HYPERGLYCEMIA, UNSPECIFIED WHETHER LONG TERM INSULIN USE (HCC): ICD-10-CM

## 2021-12-27 LAB
ALBUMIN SERPL-MCNC: 3.5 G/DL (ref 3.5–5.2)
ALBUMIN/GLOB SERPL: 0.9 G/DL
ALP SERPL-CCNC: 203 U/L (ref 39–117)
ALT SERPL W P-5'-P-CCNC: 8 U/L (ref 1–33)
ANION GAP SERPL CALCULATED.3IONS-SCNC: 15 MMOL/L (ref 5–15)
APTT PPP: 26.4 SECONDS (ref 20–40.3)
AST SERPL-CCNC: 9 U/L (ref 1–32)
BASOPHILS # BLD AUTO: 0.05 10*3/MM3 (ref 0–0.2)
BASOPHILS NFR BLD AUTO: 0.5 % (ref 0–1.5)
BILIRUB SERPL-MCNC: 0.2 MG/DL (ref 0–1.2)
BUN SERPL-MCNC: 32 MG/DL (ref 8–23)
BUN/CREAT SERPL: 11.3 (ref 7–25)
CALCIUM SPEC-SCNC: 8.6 MG/DL (ref 8.6–10.5)
CHLORIDE SERPL-SCNC: 91 MMOL/L (ref 98–107)
CO2 SERPL-SCNC: 24 MMOL/L (ref 22–29)
CREAT SERPL-MCNC: 2.83 MG/DL (ref 0.57–1)
DEPRECATED RDW RBC AUTO: 52.9 FL (ref 37–54)
EOSINOPHIL # BLD AUTO: 0.26 10*3/MM3 (ref 0–0.4)
EOSINOPHIL NFR BLD AUTO: 2.4 % (ref 0.3–6.2)
ERYTHROCYTE [DISTWIDTH] IN BLOOD BY AUTOMATED COUNT: 16.7 % (ref 12.3–15.4)
FLUAV RNA RESP QL NAA+PROBE: NOT DETECTED
FLUBV RNA RESP QL NAA+PROBE: NOT DETECTED
GFR SERPL CREATININE-BSD FRML MDRD: 17 ML/MIN/1.73
GLOBULIN UR ELPH-MCNC: 3.8 GM/DL
GLUCOSE SERPL-MCNC: 358 MG/DL (ref 65–99)
HCT VFR BLD AUTO: 23.8 % (ref 34–46.6)
HGB BLD-MCNC: 7.8 G/DL (ref 12–15.9)
HOLD SPECIMEN: NORMAL
IMM GRANULOCYTES # BLD AUTO: 0.17 10*3/MM3 (ref 0–0.05)
IMM GRANULOCYTES NFR BLD AUTO: 1.5 % (ref 0–0.5)
INR PPP: 1.04 (ref 0.8–1.2)
LYMPHOCYTES # BLD AUTO: 1.31 10*3/MM3 (ref 0.7–3.1)
LYMPHOCYTES NFR BLD AUTO: 11.9 % (ref 19.6–45.3)
MAGNESIUM SERPL-MCNC: 1.6 MG/DL (ref 1.6–2.4)
MCH RBC QN AUTO: 28.9 PG (ref 26.6–33)
MCHC RBC AUTO-ENTMCNC: 32.8 G/DL (ref 31.5–35.7)
MCV RBC AUTO: 88.1 FL (ref 79–97)
MONOCYTES # BLD AUTO: 0.47 10*3/MM3 (ref 0.1–0.9)
MONOCYTES NFR BLD AUTO: 4.3 % (ref 5–12)
NEUTROPHILS NFR BLD AUTO: 79.4 % (ref 42.7–76)
NEUTROPHILS NFR BLD AUTO: 8.78 10*3/MM3 (ref 1.7–7)
NRBC BLD AUTO-RTO: 0 /100 WBC (ref 0–0.2)
PLATELET # BLD AUTO: 223 10*3/MM3 (ref 140–450)
PMV BLD AUTO: 9 FL (ref 6–12)
POTASSIUM SERPL-SCNC: 3.7 MMOL/L (ref 3.5–5.2)
PROT SERPL-MCNC: 7.3 G/DL (ref 6–8.5)
PROTHROMBIN TIME: 13.5 SECONDS (ref 11.1–15.3)
RBC # BLD AUTO: 2.7 10*6/MM3 (ref 3.77–5.28)
SARS-COV-2 RNA RESP QL NAA+PROBE: NOT DETECTED
SODIUM SERPL-SCNC: 130 MMOL/L (ref 136–145)
TROPONIN T SERPL-MCNC: 0.04 NG/ML (ref 0–0.03)
WBC NRBC COR # BLD: 11.04 10*3/MM3 (ref 3.4–10.8)

## 2021-12-27 PROCEDURE — 96360 HYDRATION IV INFUSION INIT: CPT

## 2021-12-27 PROCEDURE — 96361 HYDRATE IV INFUSION ADD-ON: CPT

## 2021-12-27 PROCEDURE — 83735 ASSAY OF MAGNESIUM: CPT | Performed by: EMERGENCY MEDICINE

## 2021-12-27 PROCEDURE — C9803 HOPD COVID-19 SPEC COLLECT: HCPCS

## 2021-12-27 PROCEDURE — 71046 X-RAY EXAM CHEST 2 VIEWS: CPT

## 2021-12-27 PROCEDURE — 93005 ELECTROCARDIOGRAM TRACING: CPT | Performed by: EMERGENCY MEDICINE

## 2021-12-27 PROCEDURE — 87636 SARSCOV2 & INF A&B AMP PRB: CPT | Performed by: EMERGENCY MEDICINE

## 2021-12-27 PROCEDURE — 85025 COMPLETE CBC W/AUTO DIFF WBC: CPT | Performed by: EMERGENCY MEDICINE

## 2021-12-27 PROCEDURE — G0378 HOSPITAL OBSERVATION PER HR: HCPCS

## 2021-12-27 PROCEDURE — 85730 THROMBOPLASTIN TIME PARTIAL: CPT | Performed by: EMERGENCY MEDICINE

## 2021-12-27 PROCEDURE — 93010 ELECTROCARDIOGRAM REPORT: CPT | Performed by: INTERNAL MEDICINE

## 2021-12-27 PROCEDURE — 99234 HOSP IP/OBS SM DT SF/LOW 45: CPT | Performed by: STUDENT IN AN ORGANIZED HEALTH CARE EDUCATION/TRAINING PROGRAM

## 2021-12-27 PROCEDURE — 99284 EMERGENCY DEPT VISIT MOD MDM: CPT

## 2021-12-27 PROCEDURE — 85610 PROTHROMBIN TIME: CPT | Performed by: EMERGENCY MEDICINE

## 2021-12-27 PROCEDURE — 63710000001 INSULIN REGULAR HUMAN PER 5 UNITS: Performed by: EMERGENCY MEDICINE

## 2021-12-27 PROCEDURE — 80053 COMPREHEN METABOLIC PANEL: CPT | Performed by: EMERGENCY MEDICINE

## 2021-12-27 PROCEDURE — 84484 ASSAY OF TROPONIN QUANT: CPT | Performed by: EMERGENCY MEDICINE

## 2021-12-27 RX ORDER — ACETAMINOPHEN 325 MG/1
650 TABLET ORAL ONCE
Status: COMPLETED | OUTPATIENT
Start: 2021-12-27 | End: 2021-12-27

## 2021-12-27 RX ORDER — SODIUM CHLORIDE 0.9 % (FLUSH) 0.9 %
10 SYRINGE (ML) INJECTION AS NEEDED
Status: DISCONTINUED | OUTPATIENT
Start: 2021-12-27 | End: 2021-12-27 | Stop reason: HOSPADM

## 2021-12-27 RX ORDER — SODIUM CHLORIDE 9 MG/ML
75 INJECTION, SOLUTION INTRAVENOUS CONTINUOUS
Status: DISCONTINUED | OUTPATIENT
Start: 2021-12-27 | End: 2021-12-27 | Stop reason: HOSPADM

## 2021-12-27 RX ORDER — ASPIRIN 81 MG/1
324 TABLET, CHEWABLE ORAL ONCE
Status: COMPLETED | OUTPATIENT
Start: 2021-12-27 | End: 2021-12-27

## 2021-12-27 RX ADMIN — ACETAMINOPHEN 650 MG: 325 TABLET, FILM COATED ORAL at 18:26

## 2021-12-27 RX ADMIN — ASPIRIN 324 MG: 81 TABLET, CHEWABLE ORAL at 18:26

## 2021-12-27 RX ADMIN — SODIUM CHLORIDE 75 ML/HR: 9 INJECTION, SOLUTION INTRAVENOUS at 16:00

## 2021-12-27 RX ADMIN — HUMAN INSULIN 6 UNITS: 100 INJECTION, SOLUTION SUBCUTANEOUS at 18:26

## 2021-12-27 NOTE — ED NOTES
Per EMS they are en route with patient for weakness, not feeling well. Was at dialysis today at [facility], refused finishing treatment due to not feeling well. Possibly received 3 hrs worth of treatment. ETA 2-3 min. /76, HR 76, O2 99% on home O2.     Kylah New, RN  12/27/21 [id number]

## 2021-12-27 NOTE — ED NOTES
Patient states when she left dialysis she had trouble getting in her car due to weakness. States is also having a headache. Denies any covid exposure.     Kylah New RN  12/27/21 1169

## 2021-12-28 ENCOUNTER — HOME CARE VISIT (OUTPATIENT)
Dept: HOME HEALTH SERVICES | Facility: CLINIC | Age: 61
End: 2021-12-28

## 2021-12-28 LAB
QT INTERVAL: 496 MS
QTC INTERVAL: 538 MS

## 2021-12-28 PROCEDURE — G0300 HHS/HOSPICE OF LPN EA 15 MIN: HCPCS

## 2021-12-28 NOTE — OUTREACH NOTE
Medical Week 3 Survey      Responses   [facility] patient discharged from? [place]   Does the patient have one of the following disease processes/diagnoses(primary or secondary)? Other   Week 3 attempt successful? No   Revoke Readmitted          Alison Hooker RN

## 2021-12-28 NOTE — DISCHARGE SUMMARY
"    DISCHARGE SUMMARY    PATIENT NAME: Yamileth Slater         PHYSICIAN: Haily Mcneil MD  : 1960  MRN: [id number]    ADMITTED: 2021     DISCHARGED: 21     Chief Complaint   Patient presents with   • Weakness - Generalized   • Headache     ADMISSION DIAGNOSES:  Active Hospital Problems   No active problems to display.      Resolved Hospital Problems    Diagnosis Date Resolved POA   • Weakness [R53.1] 2021 Yes     DISCHARGE DIAGNOSES:   Active Hospital Problems   No active problems to display.      Resolved Hospital Problems    Diagnosis Date Resolved POA   • Weakness [R53.1] 2021 Yes       SERVICE: Family Medicine Residency  Attending: Kit rBar MD  Resident: Haily Mcneil MD    CONSULTS:   Consult Orders (all) (From admission, onward)     Start     Ordered    21 175  Family Practice - Resident (on-call MD unless specified)  Once        Specialty:  Family Medicine  Provider:  (Not yet assigned)    21 175              Scheduled Meds:  Continuous Infusions:No current facility-administered medications for this encounter.    PRN Meds:.None    PROCEDURES:   None    HISTORY OF PRESENT ILLNESS:     Yamileth Slater is a 61 year old female with a CMH of CKD 4 on dialysis MWF, uncontrolled DM 2, HFpEF, GERD, HTN, HLD, MIKE on CPAP, obesity hypoventilation syndrome who presents to the emergency department complaining of generalized weakness after an incomplete dialysis session. She states she felt like \"air in her head\" and \"foggy brained\" during dialysis and cut the dialysis session short. Her  accompanies her and states that she just looked weak getting out of the car and that's when they called an ambulance.     In the ER, patient's vitals are stable, is drinking water, on IVF NS, and appears well. She states she feels weak however, does not feel she needs to be admitted. She received 6 units subcu insulin.    Review of Systems   Constitutional: Negative for " "chills and fever.   HENT: Positive for ear pain (  Left ear). Negative for rhinorrhea and sore throat.    Eyes: Negative for photophobia and visual disturbance.   Respiratory: Negative for cough and shortness of breath.    Cardiovascular: Negative for chest pain and palpitations.   Gastrointestinal: Negative for abdominal pain and nausea.   Genitourinary: Negative for difficulty urinating and flank pain.   Musculoskeletal: Negative for arthralgias and back pain.   Neurological: Positive for weakness and light-headedness. Negative for dizziness, syncope and headaches.   Psychiatric/Behavioral: Negative for confusion. The patient is not nervous/anxious.      /58 (BP Location: Right arm)   Pulse 69   Temp 98.1 °F (36.7 °C) (Oral)   Resp 20   Ht 157.5 cm (62\")   Wt 128 kg (282 lb)   LMP  (LMP Unknown)   SpO2 98%   BMI 51.58 kg/m²     Physical Exam  Vitals reviewed.   Constitutional:       General: She is not in acute distress.     Appearance: Normal appearance. She is well-developed and well-groomed. She is morbidly obese.      Interventions: Nasal cannula in place.   HENT:      Head: Normocephalic.      Right Ear: Hearing, tympanic membrane, ear canal and external ear normal.      Left Ear: Hearing, tympanic membrane, ear canal and external ear normal. No hemotympanum (Blood vessel ruputure in memebrane, contained).      Nose: Nose normal.      Mouth/Throat:      Lips: Pink.      Mouth: Mucous membranes are moist.      Pharynx: Oropharynx is clear. Uvula midline.   Eyes:      General: Lids are normal.      Conjunctiva/sclera: Conjunctivae normal.      Pupils: Pupils are equal, round, and reactive to light.   Cardiovascular:      Rate and Rhythm: Normal rate and regular rhythm.      Pulses: Normal pulses.           Radial pulses are 2+ on the right side and 2+ on the left side.      Heart sounds: Normal heart sounds. No murmur heard.  No friction rub. No gallop.    Pulmonary:      Effort: Pulmonary effort " is normal.      Breath sounds: Normal breath sounds. No wheezing, rhonchi or rales.   Abdominal:      General: Abdomen is protuberant. Bowel sounds are normal.      Palpations: Abdomen is soft.   Musculoskeletal:      Cervical back: Neck supple.      Right lower leg: No edema.      Left lower leg: No edema.   Skin:     General: Skin is warm.   Neurological:      Mental Status: She is alert and oriented to person, place, and time.      GCS: GCS eye subscore is 4. GCS verbal subscore is 5. GCS motor subscore is 6.   Psychiatric:         Attention and Perception: Attention and perception normal.         Mood and Affect: Mood and affect normal.         Speech: Speech normal.         Behavior: Behavior normal. Behavior is cooperative.         Thought Content: Thought content normal.         Cognition and Memory: Cognition and memory normal.         Judgment: Judgment normal.       Past Medical History:   Diagnosis Date   • Acute blood loss anemia 4/16/2017    Likely due to gastric oozing at this time. - Dr. Duarte (GI) was consulted and has now signed off, will follow up outpatient - pill colonoscopy showed AVMs - continue to monitor   • Anxiety    • CAD (coronary artery disease) 4/24/2021    S/P 3 stents 5/1/2021 for [facility] Continue ASA 81mg & Clopidogrel 75mg Continue Atorvastatin 40mg   • Carotid artery stenosis    • Chronic obstructive lung disease (HCC)    • CKD (chronic kidney disease) stage 4, GFR 15-29 ml/min (HCC)    • Colonic polyp    • Coronary arteriosclerosis    • Diabetes mellitus (HCC)    • Diabetic neuropathy (HCC)    • Ear pain, right 10/18/2021    - canal trauma due to patient scratching and DMT2 - added cortisporin ear drops   • Elevated troponin 10/12/2021    -most likely from CKD -Trending down -Neg chest pain   • GERD (gastroesophageal reflux disease)    • GI bleed 5/13/2021    - GI will follow up outpatient - Protonix 40mg daily - Avoid medical DVT prophy and use mechanical at this time instead. -  Continue to monitor - pill colonoscopy results showed AVMs   • History of transfusion    • Hypercholesterolemia    • Hypertension    • Hypomagnesemia 6/27/2021    Monitor and replace   • Morbid obesity (HCC)    • Nephrolithiasis    • Peripheral vascular disease (HCC)    • Sleep apnea    • Substance abuse (HCC)    • Vitamin D deficiency      Past Surgical History:   Procedure Laterality Date   • CARDIAC CATHETERIZATION N/A 7/14/2020   • CARDIAC CATHETERIZATION N/A 4/23/2021    Procedure: Left Heart Cath;  Surgeon: Melba Romo MD;  Location: [facility] CATH INVASIVE LOCATION;  Service: Cardiology;  Laterality: N/A;   • CARDIAC CATHETERIZATION N/A 4/30/2021    Procedure: Percutaneous Coronary Intervention;  Surgeon: Russell Voss MD;  Location: [facility] CATH INVASIVE LOCATION;  Service: Cardiovascular;  Laterality: N/A;   • CARDIAC CATHETERIZATION N/A 4/30/2021    Procedure: Stent NIKKI coronary;  Surgeon: Russell Voss MD;  Location: [facility] CATH INVASIVE LOCATION;  Service: Cardiovascular;  Laterality: N/A;   • CARDIAC CATHETERIZATION Left 11/13/2021    Procedure: Left Heart Cath;  Surgeon: Niall Rios MD;  Location: [facility] CATH INVASIVE LOCATION;  Service: Cardiology;  Laterality: Left;   • CAROTID STENT Left    • COLONOSCOPY     • COLONOSCOPY N/A 5/14/2021    Procedure: COLONOSCOPY;  Surgeon: Mingo Duarte MD;  Location: [facility] ENDOSCOPY;  Service: Gastroenterology;  Laterality: N/A;   • CORONARY ARTERY BYPASS GRAFT N/A 2013    CABG X 3   • CYSTOSCOPY BLADDER STONE LITHOTRIPSY Bilateral    • ENDOSCOPY N/A 4/12/2021    Procedure: ESOPHAGOGASTRODUODENOSCOPY;  Surgeon: Mingo Duarte MD;  Location: [facility] ENDOSCOPY;  Service: Gastroenterology;  Laterality: N/A;   • ENDOSCOPY N/A 5/14/2021    Procedure: ESOPHAGOGASTRODUODENOSCOPY;  Surgeon: Mingo Duarte MD;  Location: [facility] ENDOSCOPY;  Service: Gastroenterology;  Laterality: N/A;   • INTERVENTIONAL RADIOLOGY PROCEDURE N/A  10/21/2021    Procedure: tunneled central venous catheter placement;  Surgeon: Donnie Robles MD;  Location: [facility] INVASIVE LOCATION;  Service: Interventional Radiology;  Laterality: N/A;     Social History     Socioeconomic History   • Marital status:      Spouse name: wesley dumont   Tobacco Use   • Smoking status: Former Smoker     Packs/day: 0.25     Years: 46.00     Pack years: 11.50     Types: Cigarettes   • Smokeless tobacco: Never Used   • Tobacco comment: only smoking 5 a day - quit april 23 2021   Substance and Sexual Activity   • Alcohol use: No   • Drug use: Not Currently     Types: LSD, Marijuana, Methamphetamines   • Sexual activity: Not Currently     Allergies   Allergen Reactions   • Adhesive Tape Hives   • Other      Bandaids, MRSA, SURECLOSE         DIAGNOSTIC DATA:   Lab Results (last 24 hours)     Procedure Component Value Units Date/Time    Extra Tubes [[id number]] Collected: 12/27/21 1552    Specimen: Blood, Venous Line Updated: 12/27/21 1700    Narrative:      The following orders were created for panel order Extra Tubes.  Procedure                               Abnormality         Status                     ---------                               -----------         ------                     Gold Top - SST[[id number]]                                   Final result                 Please view results for these tests on the individual orders.    Gold Top - SST [[id number]] Collected: 12/27/21 1552    Specimen: Blood Updated: 12/27/21 1700     Extra Tube Hold for add-ons.     Comment: Auto resulted.       COVID-19 and FLU A/B PCR - Swab, Nasopharynx [[id number]]  (Normal) Collected: 12/27/21 1600    Specimen: Swab from Nasopharynx Updated: 12/27/21 1648     COVID19 Not Detected     Influenza A PCR Not Detected     Influenza B PCR Not Detected    Narrative:      Fact sheet for providers: https://www.fda.gov/media/481626/download    Fact sheet for patients:  https://www.fda.gov/media/751733/download    Test performed by PCR.    Magnesium [[id number]]  (Normal) Collected: 12/27/21 1548    Specimen: Blood Updated: 12/27/21 1647     Magnesium 1.6 mg/dL     Troponin [[id number]]  (Abnormal) Collected: 12/27/21 1548    Specimen: Blood Updated: 12/27/21 1612     Troponin T 0.038 ng/mL     Narrative:      Troponin T Reference Range:  <= 0.03 ng/mL-   Negative for AMI  >0.03 ng/mL-     Abnormal for myocardial necrosis.  Clinicians would have to utilize clinical acumen, EKG, Troponin and serial changes to determine if it is an Acute Myocardial Infarction or myocardial injury due to an underlying chronic condition.       Results may be falsely decreased if patient taking Biotin.      Comprehensive Metabolic Panel [[id number]]  (Abnormal) Collected: 12/27/21 1548    Specimen: Blood Updated: 12/27/21 1611     Glucose 358 mg/dL      BUN 32 mg/dL      Creatinine 2.83 mg/dL      Sodium 130 mmol/L      Potassium 3.7 mmol/L      Chloride 91 mmol/L      CO2 24.0 mmol/L      Calcium 8.6 mg/dL      Total Protein 7.3 g/dL      Albumin 3.50 g/dL      ALT (SGPT) 8 U/L      AST (SGOT) 9 U/L      Alkaline Phosphatase 203 U/L      Total Bilirubin 0.2 mg/dL      eGFR Non African Amer 17 mL/min/1.73      Globulin 3.8 gm/dL      A/G Ratio 0.9 g/dL      BUN/Creatinine Ratio 11.3     Anion Gap 15.0 mmol/L     Narrative:      GFR Normal >60  Chronic Kidney Disease <60  Kidney Failure <15      aPTT [[id number]]  (Normal) Collected: 12/27/21 1548    Specimen: Blood Updated: 12/27/21 1609     PTT 26.4 seconds     Narrative:      The recommended Heparin therapeutic range is 68-97 seconds.    Protime-INR [[id number]]  (Normal) Collected: 12/27/21 1548    Specimen: Blood Updated: 12/27/21 1609     Protime 13.5 Seconds      INR 1.04    Narrative:      Therapeutic range for most indications is 2.0-3.0 INR,  or 2.5-3.5 for mechanical heart valves.    CBC & Differential [[id number]]  (Abnormal) Collected: 12/27/21  1548    Specimen: Blood Updated: 12/27/21 1555    Narrative:      The following orders were created for panel order CBC & Differential.  Procedure                               Abnormality         Status                     ---------                               -----------         ------                     CBC Auto Differential[[id number]]        Abnormal            Final result                 Please view results for these tests on the individual orders.    CBC Auto Differential [[id number]]  (Abnormal) Collected: 12/27/21 1548    Specimen: Blood Updated: 12/27/21 1555     WBC 11.04 10*3/mm3      RBC 2.70 10*6/mm3      Hemoglobin 7.8 g/dL      Hematocrit 23.8 %      MCV 88.1 fL      MCH 28.9 pg      MCHC 32.8 g/dL      RDW 16.7 %      RDW-SD 52.9 fl      MPV 9.0 fL      Platelets 223 10*3/mm3      Neutrophil % 79.4 %      Lymphocyte % 11.9 %      Monocyte % 4.3 %      Eosinophil % 2.4 %      Basophil % 0.5 %      Immature Grans % 1.5 %      Neutrophils, Absolute 8.78 10*3/mm3      Lymphocytes, Absolute 1.31 10*3/mm3      Monocytes, Absolute 0.47 10*3/mm3      Eosinophils, Absolute 0.26 10*3/mm3      Basophils, Absolute 0.05 10*3/mm3      Immature Grans, Absolute 0.17 10*3/mm3      nRBC 0.0 /100 WBC         XR Chest 2 View    Result Date: 12/27/2021  Chest x-ray PA and lateral. CLINICAL INDICATION: Shortness of breath. Weakness. COMPARISON: Chest December 12, 2021. FINDINGS: Cardiac silhouette is enlarged in size. Pulmonary vascularity is increased. There are midline sternal sutures and prior cardiac surgery. Large caliber dual lumen central venous tunneled catheter ,right jugular approach with catheter tip in the superior  vena cava in satisfactory position. Small right-sided effusion. No significant changes since prior examination. Pression: Cardiomegaly. Pulmonary vascular prominence. Midline sternal sutures and prior cardiac surgery. Small right-sided effusion. Central venous catheter with catheter tip in  superior vena cava in satisfactory position. Electronically signed by:  Hugo Russo MD  12/27/2021 4:22 PM CST Workstation: [id number]      HOSPITAL COURSE:  Patient was stable in the ER stated she felt better than before she came in. Physical exam findings were within normal limits, lab values were stable per patient after just having had a dialysis session earlier today. Patient was able to ambulate without difficulty and was amenable to discharge from the ER. She was encouraged to follow up with our office and schedule an appointment for Thursday afternoon, and bring all of her medications to the visit, to help her optimize her glucose management. Patient understood and acknowledged education, counseling, medication/treatment benefits and side effects. Risks, complications, and expectations of outcomes were also addressed, discussed, and acknowledged. All questions were answered and addressed.   I sent a message to our  to call her in the morning.     DISCHARGE CONDITION:   Stable    DISPOSITION:  Home or Self Care    DISCHARGE MEDICATIONS     Discharge Medications      Continue These Medications      Instructions Start Date   acetaminophen 650 MG 8 hr tablet  Commonly known as: Tylenol 8 Hour   650 mg, Oral, Every 8 Hours PRN      Adult Aspirin Regimen 81 MG EC tablet  Generic drug: aspirin   81 mg, Oral, Daily      albuterol sulfate  (90 Base) MCG/ACT inhaler  Commonly known as: PROVENTIL HFA;VENTOLIN HFA;PROAIR HFA   2 puffs, Inhalation, Every 4 Hours PRN      albuterol (2.5 MG/3ML) 0.083% nebulizer solution  Commonly known as: PROVENTIL   Inhale the contents of 1 vial by nebulization Every 4 (Four) Hours As Needed for Wheezing.      atorvastatin 20 MG tablet  Commonly known as: LIPITOR   TAKE ONE TABLET BY MOUTH EVERY NIGHT AT BEDTIME      bumetanide 2 MG tablet  Commonly known as: BUMEX   2 mg, Oral, 4 Times Weekly      cetirizine 10 MG tablet  Commonly known as: zyrTEC   10 mg, Oral,  Daily      clopidogrel 75 MG tablet  Commonly known as: PLAVIX   75 mg, Oral, Daily      [employer] Blood Glucose Meter w/Device kit   1 each, Does not apply, 3 Times Daily      cyclobenzaprine 10 MG tablet  Commonly known as: FLEXERIL   10 mg, Oral, 2 Times Daily PRN      DULoxetine 20 MG capsule  Commonly known as: CYMBALTA   20 mg, Oral, Daily      Easy Touch Pen Needles 31G X 8 MM misc  Generic drug: Insulin Pen Needle   No dose, route, or frequency recorded.      NovoFine 30G X 8 MM misc  Generic drug: Insulin Pen Needle   As directed 4 times daily      Insulin Pen Needle 31G X 8 MM misc   Use to inject insulin 4 (Four) Times a Day as directed.      epoetin hubert-epbx 95535 UNIT/ML injection  Commonly known as: RETACRIT   10,000 Units, Subcutaneous, 3 Times Weekly      ferrous sulfate 325 (65 FE) MG tablet  Commonly known as: FeroSul   325 mg, Oral, Daily With Breakfast      fluticasone 50 MCG/ACT nasal spray  Commonly known as: FLONASE   2 sprays, Each Nare, Daily      FreeStyle Jade 2 [place] device   1 each, Does not apply, Continuous      FreeStyle Jade 2 Sensor misc   1 each, Does not apply, Every 14 Days      gabapentin 800 MG tablet  Commonly known as: NEURONTIN   800 mg, Oral, 3 Times Daily      glucose blood test strip   Use as instructed      insulin aspart 100 UNIT/ML solution pen-injector sc pen  Commonly known as: novoLOG FLEXPEN   30 Units, Subcutaneous, 3 Times Daily With Meals      insulin detemir 100 UNIT/ML injection  Commonly known as: LEVEMIR   60 Units, Subcutaneous, Daily      ipratropium-albuterol 0.5-2.5 mg/3 ml nebulizer  Commonly known as: DUO-NEB   3 mL, Nebulization, 4 Times Daily - RT      isosorbide mononitrate 30 MG 24 hr tablet  Commonly known as: IMDUR   30 mg, Oral, Every Morning      levothyroxine 25 MCG tablet  Commonly known as: SYNTHROID, LEVOTHROID   25 mcg, Oral, Daily      lidocaine 5 %  Commonly known as: LIDODERM   APPLY TO THE AFFECTED AREA(S) TOPICALLY TWO TIMES A DAY.  REMOVE NIGHTLY      lisinopril 5 MG tablet  Commonly known as: PRINIVIL,ZESTRIL   5 mg, Oral, Daily      metoprolol tartrate 50 MG tablet  Commonly known as: LOPRESSOR   TAKE ONE TABLET BY MOUTH TWO TIMES A DAY. HOLD IF PULSE IS LESS THAN 60      montelukast 10 MG tablet  Commonly known as: SINGULAIR   10 mg, Oral, Nightly      nitroglycerin 0.4 MG SL tablet  Commonly known as: NITROSTAT   0.4 mg, Sublingual, Every 5 Minutes PRN      nystatin 853705 UNIT/GM powder  Commonly known as: MYCOSTATIN   Topical, 3 Times Daily      O2  Commonly known as: OXYGEN   3 L/min, Inhalation, Continuous      ondansetron ODT 4 MG disintegrating tablet  Commonly known as: Zofran ODT   4 mg, Translingual, Every 8 Hours PRN      oxybutynin XL 5 MG 24 hr tablet  Commonly known as: DITROPAN-XL   5 mg, Oral, Daily      pantoprazole 40 MG EC tablet  Commonly known as: PROTONIX   40 mg, Oral, Nightly      promethazine 25 MG tablet  Commonly known as: PHENERGAN   25 mg, Oral, Every 6 Hours PRN      ranolazine 500 MG 12 hr tablet  Commonly known as: RANEXA   500 mg, Oral, Every 12 Hours Scheduled      sennosides-docusate 8.6-50 MG per tablet  Commonly known as: PERICOLACE   2 tablets, Oral, 2 Times Daily             INSTRUCTIONS:  Activity:   Activity Instructions     Activity as Tolerated          Diet:   Diet Instructions     Diet: Consistent Carbohydrate, Renal, Cardiac      Discharge Diet:  Consistent Carbohydrate  Renal  Cardiac             FOLLOW UP:    Follow-up Information     Rianna Macias MD. Schedule an appointment as soon as possible for a visit on 12/30/2021.    Specialty: Family Medicine  Contact information:  [address] [place] [address] [phone]                         PENDING TEST RESULTS AT DISCHARGE      Time: >30 minutes were spent in discharge planning, medication reconciliation and coordination of care for this patient.    Kit Brar MD is the attending at time of discharge, He is aware of the patient's  status and agrees with the above discharge summary.        This document has been electronically signed by Haily Mcneil MD on December 27, 2021 19:53 CST

## 2021-12-28 NOTE — ED PROVIDER NOTES
"Subjective   62yo female pmh significant htn/hyperlipidemia/dm2/cad/esrd/nestor/HFpEF/chronic respiratory failure/hypothyroid/obesity, presents ED via EMS c/o 1d hx generalized weakness.  Pt reportedly had partial hemodialysis today and notified dialysis staff of \"not feeling well.\"  Pt subsequently disconnected from hemodialysis and was unable to ambulate to vehicle secondary to weakness.  Pt referred ED for further evaluation.  Denies chest pain/soa/focal motor weakness/paresthesia/abd pain/syncope.  ROS (+) mild usual headache associated with dialysis.      Weakness - Generalized  Severity:  Moderate  Duration:  1 day  Chronicity:  Recurrent  Associated symptoms: headaches        Review of Systems   Constitutional: Positive for fatigue.   Respiratory: Negative.    Cardiovascular: Negative.    Gastrointestinal: Negative.    Allergic/Immunologic: Positive for immunocompromised state.   Neurological: Positive for weakness and headaches.   All other systems reviewed and are negative.      Past Medical History:   Diagnosis Date   • Acute blood loss anemia 4/16/2017    Likely due to gastric oozing at this time. - Dr. Duarte (GI) was consulted and has now signed off, will follow up outpatient - pill colonoscopy showed AVMs - continue to monitor   • Anxiety    • Carotid artery stenosis    • Chronic obstructive lung disease (HCC)    • CKD (chronic kidney disease) stage 4, GFR 15-29 ml/min (HCC)    • Colonic polyp    • Coronary arteriosclerosis    • Diabetes mellitus (HCC)    • Diabetic neuropathy (HCC)    • Ear pain, right 10/18/2021    - canal trauma due to patient scratching and DMT2 - added cortisporin ear drops   • Elevated troponin 10/12/2021    -most likely from CKD -Trending down -Neg chest pain   • GERD (gastroesophageal reflux disease)    • GI bleed 5/13/2021    - GI will follow up outpatient - Protonix 40mg daily - Avoid medical DVT prophy and use mechanical at this time instead. - Continue to monitor - pill " colonoscopy results showed AVMs   • History of transfusion    • Hypercholesterolemia    • Hypertension    • Hypomagnesemia 6/27/2021    Monitor and replace   • Morbid obesity (HCC)    • Nephrolithiasis    • Peripheral vascular disease (HCC)    • Sleep apnea    • Substance abuse (HCC)    • Vitamin D deficiency        Allergies   Allergen Reactions   • Adhesive Tape Hives   • Other      Bandaids, MRSA, SURECLOSE       Past Surgical History:   Procedure Laterality Date   • CARDIAC CATHETERIZATION N/A 7/14/2020   • CARDIAC CATHETERIZATION N/A 4/23/2021    Procedure: Left Heart Cath;  Surgeon: Melba Romo MD;  Location: [facility] CATH INVASIVE LOCATION;  Service: Cardiology;  Laterality: N/A;   • CARDIAC CATHETERIZATION N/A 4/30/2021    Procedure: Percutaneous Coronary Intervention;  Surgeon: Russell Voss MD;  Location: [facility] CATH INVASIVE LOCATION;  Service: Cardiovascular;  Laterality: N/A;   • CARDIAC CATHETERIZATION N/A 4/30/2021    Procedure: Stent NIKKI coronary;  Surgeon: Russell Voss MD;  Location: [facility] CATH INVASIVE LOCATION;  Service: Cardiovascular;  Laterality: N/A;   • CARDIAC CATHETERIZATION Left 11/13/2021    Procedure: Left Heart Cath;  Surgeon: Niall Rios MD;  Location: [facility] CATH INVASIVE LOCATION;  Service: Cardiology;  Laterality: Left;   • CAROTID STENT Left    • COLONOSCOPY     • COLONOSCOPY N/A 5/14/2021    Procedure: COLONOSCOPY;  Surgeon: Mingo Duarte MD;  Location: [facility] ENDOSCOPY;  Service: Gastroenterology;  Laterality: N/A;   • CORONARY ARTERY BYPASS GRAFT N/A 2013    CABG X 3   • CYSTOSCOPY BLADDER STONE LITHOTRIPSY Bilateral    • ENDOSCOPY N/A 4/12/2021    Procedure: ESOPHAGOGASTRODUODENOSCOPY;  Surgeon: Mingo Duarte MD;  Location: [facility] ENDOSCOPY;  Service: Gastroenterology;  Laterality: N/A;   • ENDOSCOPY N/A 5/14/2021    Procedure: ESOPHAGOGASTRODUODENOSCOPY;  Surgeon: Mingo Duarte MD;  Location: [facility] ENDOSCOPY;  Service:  Gastroenterology;  Laterality: N/A;   • INTERVENTIONAL RADIOLOGY PROCEDURE N/A 10/21/2021    Procedure: tunneled central venous catheter placement;  Surgeon: Donnie Robles MD;  Location: [facility] INVASIVE LOCATION;  Service: Interventional Radiology;  Laterality: N/A;       Family History   Problem Relation Age of Onset   • Heart disease Mother    • Lung cancer Mother    • Heart disease Father    • Heart attack Father    • Diabetes Father    • Heart disease Half-Sister         Dad's side   • Heart disease Brother    • No Known Problems Sister    • No Known Problems Sister    • No Known Problems Sister    • No Known Problems Sister    • No Known Problems Sister    • Pancreatic cancer Half-Sister         Dad's side   • No Known Problems Brother    • No Known Problems Brother    • Heart attack Half-Brother    • Heart attack Half-Brother    • No Known Problems Maternal Grandmother    • No Known Problems Maternal Grandfather    • No Known Problems Paternal Grandmother    • No Known Problems Paternal Grandfather        Social History     Socioeconomic History   • Marital status:      Spouse name: wesley dumont   Tobacco Use   • Smoking status: Former Smoker     Packs/day: 0.25     Years: 46.00     Pack years: 11.50     Types: Cigarettes   • Smokeless tobacco: Never Used   • Tobacco comment: only smoking 5 a day - quit april 23 2021   Substance and Sexual Activity   • Alcohol use: No   • Drug use: Not Currently     Types: LSD, Marijuana, Methamphetamines   • Sexual activity: Not Currently           Objective   Physical Exam  Vitals and nursing note reviewed.   Constitutional:       Appearance: Normal appearance. She is obese.   HENT:      Head: Normocephalic and atraumatic.      Right Ear: Tympanic membrane, ear canal and external ear normal.      Left Ear: Tympanic membrane, ear canal and external ear normal.      Mouth/Throat:      Mouth: Mucous membranes are moist.   Eyes:      Pupils: Pupils are  equal, round, and reactive to light.   Cardiovascular:      Rate and Rhythm: Normal rate and regular rhythm.      Pulses: Normal pulses.      Heart sounds: Normal heart sounds. No murmur heard.  No friction rub. No gallop.    Pulmonary:      Effort: Pulmonary effort is normal.      Breath sounds: Examination of the right-middle field reveals decreased breath sounds. Examination of the left-middle field reveals decreased breath sounds. Examination of the right-lower field reveals decreased breath sounds. Examination of the left-lower field reveals decreased breath sounds. Decreased breath sounds present. No wheezing, rhonchi or rales.   Chest:       Abdominal:      General: Bowel sounds are normal. There is no distension.      Palpations: Abdomen is soft.      Tenderness: There is no abdominal tenderness. There is no guarding or rebound.   Musculoskeletal:         General: No deformity.      Cervical back: Normal range of motion and neck supple. No rigidity.      Right lower leg: No edema.      Left lower leg: No edema.   Lymphadenopathy:      Cervical: No cervical adenopathy.   Skin:     General: Skin is warm and dry.   Neurological:      General: No focal deficit present.      Mental Status: She is alert and oriented to person, place, and time.      GCS: GCS eye subscore is 4. GCS verbal subscore is 5. GCS motor subscore is 6.      Cranial Nerves: Cranial nerves are intact.      Sensory: Sensation is intact.      Motor: Motor function is intact.      Coordination: Coordination is intact. Finger-Nose-Finger Test normal.         ECG 12 Lead      Date/Time: 12/27/2021 6:04 PM  Performed by: Gianluca Chatman MD  Authorized by: Gianluca Chatman MD   Interpreted by physician  Rhythm: sinus rhythm  Rate: normal  BPM: 71  QRS axis: right  Conduction: right bundle branch block  ST Segments: ST segments normal  Other findings: prolonged QTc interval  Q waves: II, III and aVF  Clinical impression: abnormal ECG                  ED Course      Labs Reviewed   COMPREHENSIVE METABOLIC PANEL - Abnormal; Notable for the following components:       Result Value    Glucose 358 (*)     BUN 32 (*)     Creatinine 2.83 (*)     Sodium 130 (*)     Chloride 91 (*)     Alkaline Phosphatase 203 (*)     eGFR Non  Amer 17 (*)     All other components within normal limits    Narrative:     GFR Normal >60  Chronic Kidney Disease <60  Kidney Failure <15     TROPONIN (IN-HOUSE) - Abnormal; Notable for the following components:    Troponin T 0.038 (*)     All other components within normal limits    Narrative:     Troponin T Reference Range:  <= 0.03 ng/mL-   Negative for AMI  >0.03 ng/mL-     Abnormal for myocardial necrosis.  Clinicians would have to utilize clinical acumen, EKG, Troponin and serial changes to determine if it is an Acute Myocardial Infarction or myocardial injury due to an underlying chronic condition.       Results may be falsely decreased if patient taking Biotin.     CBC WITH AUTO DIFFERENTIAL - Abnormal; Notable for the following components:    WBC 11.04 (*)     RBC 2.70 (*)     Hemoglobin 7.8 (*)     Hematocrit 23.8 (*)     RDW 16.7 (*)     Neutrophil % 79.4 (*)     Lymphocyte % 11.9 (*)     Monocyte % 4.3 (*)     Immature Grans % 1.5 (*)     Neutrophils, Absolute 8.78 (*)     Immature Grans, Absolute 0.17 (*)     All other components within normal limits   COVID-19 AND FLU A/B, NP SWAB IN TRANSPORT MEDIA 8-12 HR TAT - Normal    Narrative:     Fact sheet for providers: https://www.fda.gov/media/814980/download    Fact sheet for patients: https://www.fda.gov/media/620991/download    Test performed by PCR.   PROTIME-INR - Normal    Narrative:     Therapeutic range for most indications is 2.0-3.0 INR,  or 2.5-3.5 for mechanical heart valves.   APTT - Normal    Narrative:     The recommended Heparin therapeutic range is 68-97 seconds.   MAGNESIUM - Normal   TROPONIN (IN-HOUSE)   CBC AND DIFFERENTIAL    Narrative:     The  following orders were created for panel order CBC & Differential.  Procedure                               Abnormality         Status                     ---------                               -----------         ------                     CBC Auto Differential[[id number]]        Abnormal            Final result                 Please view results for these tests on the individual orders.   EXTRA TUBES    Narrative:     The following orders were created for panel order Extra Tubes.  Procedure                               Abnormality         Status                     ---------                               -----------         ------                     Gold [facility] - SST[[id number]]                                   Final result                 Please view results for these tests on the individual orders.   [facility] - SST     XR Chest 2 View    Result Date: 12/27/2021  Narrative: Chest x-ray PA and lateral. CLINICAL INDICATION: Shortness of breath. Weakness. COMPARISON: Chest December 12, 2021. FINDINGS: Cardiac silhouette is enlarged in size. Pulmonary vascularity is increased. There are midline sternal sutures and prior cardiac surgery. Large caliber dual lumen central venous tunneled catheter ,right jugular approach with catheter tip in the superior  vena cava in satisfactory position. Small right-sided effusion. No significant changes since prior examination. Pression: Cardiomegaly. Pulmonary vascular prominence. Midline sternal sutures and prior cardiac surgery. Small right-sided effusion. Central venous catheter with catheter tip in superior vena cava in satisfactory position. Electronically signed by:  Hugo Russo MD  12/27/2021 4:22 PM CST Workstation: [id number]    XR Chest 1 View    Result Date: 12/12/2021  Narrative: EXAM:   XR Chest, 1 View FACILITY:   [facility] REFERRING:   ROXY DIAS CLINICAL HISTORY:   61 years, Female; dyspnea TECHNIQUE:   Frontal view of the chest. COMPARISON:    November 8, 2021 FINDINGS:   Lungs:  Airspace and interstitial infiltrates seen within the right lung.   Pleural space:  Right pleural effusion.       No pneumothorax.   Heart:  Stable cardiac silhouette.   Mediastinum:  No acute pathology   Bones/joints:  Midline sternotomy wires.   Tubes, lines and devices:  Dual-lumen central venous catheter again noted in similar position.     Impression:   Airspace and interstitial infiltrates seen within the right lung. Right pleural effusion. Electronically signed by:  Radha De Leon DO  12/12/2021 8:58 PM CST Workstation: [id number]                                               [facility]    Final diagnoses:   Weakness   Type 2 diabetes mellitus with hyperglycemia, unspecified whether long term insulin use (HCC)   ESRD (end stage renal disease) (HCC)   Pleural effusion, right   Acute pulmonary edema (HCC)       ED Disposition  ED Disposition     ED Disposition Condition Comment    Decision to Admit  Level of Care: Telemetry [5]   Admitting Physician: TARIQ CONDON [[id number]]   Attending Physician: TARIQ CONDON [[id number]]   Patient Class: Observation [104]            No follow-up provider specified.       Medication List      No changes were made to your prescriptions during this visit.          Gianluca Chatman MD  12/27/21 1019

## 2021-12-28 NOTE — OUTREACH NOTE
Prep Survey      Responses   [facility] patient discharged from? [place]   Is LACE score < 7 ? Yes   Emergency Room discharge w/ pulse ox? No   Eligibility Not Eligible   What are the reasons patient is not eligible? Other  [ED patient for 4 hours]   Does the patient have one of the following disease processes/diagnoses(primary or secondary)? Other   Prep survey completed? Yes          Alison Hooker RN

## 2021-12-29 VITALS
SYSTOLIC BLOOD PRESSURE: 158 MMHG | RESPIRATION RATE: 18 BRPM | OXYGEN SATURATION: 100 % | TEMPERATURE: 97.5 F | DIASTOLIC BLOOD PRESSURE: 78 MMHG | HEART RATE: 82 BPM

## 2021-12-29 DIAGNOSIS — D50.8 OTHER IRON DEFICIENCY ANEMIA: ICD-10-CM

## 2021-12-29 DIAGNOSIS — E66.9 DIABETES MELLITUS TYPE 2 IN OBESE: ICD-10-CM

## 2021-12-29 DIAGNOSIS — E11.69 DIABETES MELLITUS TYPE 2 IN OBESE: ICD-10-CM

## 2021-12-30 ENCOUNTER — HOME CARE VISIT (OUTPATIENT)
Dept: HOME HEALTH SERVICES | Facility: CLINIC | Age: 61
End: 2021-12-30

## 2021-12-30 ENCOUNTER — TELEPHONE (OUTPATIENT)
Dept: FAMILY MEDICINE CLINIC | Facility: CLINIC | Age: 61
End: 2021-12-30

## 2021-12-30 PROCEDURE — G0157 HHC PT ASSISTANT EA 15: HCPCS

## 2021-12-30 NOTE — TELEPHONE ENCOUNTER
ATTEMPTED TO CONTACT PT TO SCHEDULE SAME DAY APT WITH PT. NO ANSWER. NO VOICEMAIL SET UP.  ----- Message from Haily Mcneil MD sent at 12/27/2021  7:53 PM CST -----  Regarding: ER follow up  Hello all,     If you have a minute to call this patient and see if she can be squeezed in on Thursday afternoon as an ER discharge follow up. Please remind her to bring all of her medications and insulin pump for instructions on how to use it and install it.     Thank you. I hope she makes it.    ?  This document has been electronically signed by Haily Mcneil MD on December 27, 2021 19:56 CST

## 2021-12-31 ENCOUNTER — HOME CARE VISIT (OUTPATIENT)
Dept: HOME HEALTH SERVICES | Facility: HOME HEALTHCARE | Age: 61
End: 2021-12-31

## 2022-01-02 VITALS
SYSTOLIC BLOOD PRESSURE: 130 MMHG | DIASTOLIC BLOOD PRESSURE: 78 MMHG | OXYGEN SATURATION: 96 % | HEART RATE: 71 BPM | RESPIRATION RATE: 18 BRPM | TEMPERATURE: 97.6 F

## 2022-01-03 NOTE — CASE COMMUNICATION
PT WAS REFERRED TO HOME HEALTH FOLLOWING HOSPITALIZATION FOR 3 WEEKS IN OCTOBER SECONDARY TO A GI BLEED. PATIENT HAS HAD AN INCREASE IN WEAKNESS AND HAS REECNTLY STARTED DIALYSIS AND WAS SENT TO A SNF FOR REHAB PRIOR TO DISCHARGE HOME FROM HOSPITAL. WHILE AT THE SNF PATIENT DEVELOPED CHEST PAIN AND WAS SENT DIRECTLY TO THE ER, SHE WAS FOUND TO HAVE 2 BLOCKAGES AND SHE NEEDED 2 STENTS PLACED WHILE HOSPITALIZED. PATIENT STATES SHE DEVELOP ED HYPOTHERMIA WHILE HOSPITALIZED AFTER HER HEART CATH BUT SHE HAS STARTED FEELING BETTER. PATIENT WAS DISCHARGED HOME 11/15/2021 BUT WAS UNABLE TO BE ADMITTED LAST WEEK SECONDARY TO PATIENT REQUEST. PATIENT HAS HAD A HARD TIME WITH WEAKNESS AND FEELING BAD SINCE MULTIPLE HOSPITAL STAYS AND DIALYSIS.         PRIOR VS CURRENT LEVEL OF FUNCTION:    GAIT: 40'ft with rollator and CGA on eval; Gait 3'min inside home without AD and I at DC.     T/F:  SBA on eval; sit to stands from recliner with I at DC.     BED MOBILITY: SBA for eval; I on DC    DISCHARGE CONDITION: GOOD    DISCHARGE REASON: GOALS MET    DISCHARGE TO SELF-CARE WITH FAMILY ASSIST AS NEEDED.      NEXT MD VISIT: UNSURE

## 2022-01-03 NOTE — HOME HEALTH
Patient stated she just returned from dialysis so I'm pretty tired. I can do everything I need to do, just get tired quick.

## 2022-01-04 ENCOUNTER — HOME CARE VISIT (OUTPATIENT)
Dept: HOME HEALTH SERVICES | Facility: CLINIC | Age: 62
End: 2022-01-04

## 2022-01-04 PROCEDURE — G0299 HHS/HOSPICE OF RN EA 15 MIN: HCPCS

## 2022-01-04 RX ORDER — FERROUS SULFATE 325(65) MG
1 TABLET ORAL
Qty: 30 TABLET | Refills: 4 | OUTPATIENT
Start: 2022-01-04

## 2022-01-04 RX ORDER — LISINOPRIL 10 MG/1
10 TABLET ORAL DAILY
Qty: 30 TABLET | Refills: 6 | OUTPATIENT
Start: 2022-01-04

## 2022-01-04 RX ORDER — CLOPIDOGREL BISULFATE 75 MG/1
75 TABLET ORAL DAILY
Qty: 30 TABLET | Refills: 5 | OUTPATIENT
Start: 2022-01-04

## 2022-01-04 RX ORDER — CYCLOBENZAPRINE HCL 10 MG
10 TABLET ORAL 2 TIMES DAILY PRN
Qty: 60 TABLET | Refills: 5 | OUTPATIENT
Start: 2022-01-04

## 2022-01-07 VITALS
SYSTOLIC BLOOD PRESSURE: 118 MMHG | RESPIRATION RATE: 20 BRPM | DIASTOLIC BLOOD PRESSURE: 72 MMHG | OXYGEN SATURATION: 95 % | TEMPERATURE: 98 F | HEART RATE: 90 BPM

## 2022-01-09 ENCOUNTER — APPOINTMENT (OUTPATIENT)
Dept: INTERVENTIONAL RADIOLOGY/VASCULAR | Facility: HOSPITAL | Age: 62
End: 2022-01-09

## 2022-01-09 ENCOUNTER — APPOINTMENT (OUTPATIENT)
Dept: GENERAL RADIOLOGY | Facility: HOSPITAL | Age: 62
End: 2022-01-09

## 2022-01-09 ENCOUNTER — APPOINTMENT (OUTPATIENT)
Dept: ULTRASOUND IMAGING | Facility: HOSPITAL | Age: 62
End: 2022-01-09

## 2022-01-09 ENCOUNTER — HOSPITAL ENCOUNTER (INPATIENT)
Facility: HOSPITAL | Age: 62
LOS: 10 days | Discharge: HOME HEALTH | End: 2022-01-20
Attending: STUDENT IN AN ORGANIZED HEALTH CARE EDUCATION/TRAINING PROGRAM | Admitting: HOSPITALIST

## 2022-01-09 ENCOUNTER — APPOINTMENT (OUTPATIENT)
Dept: CT IMAGING | Facility: HOSPITAL | Age: 62
End: 2022-01-09

## 2022-01-09 DIAGNOSIS — R41.82 ALTERED MENTAL STATUS, UNSPECIFIED ALTERED MENTAL STATUS TYPE: ICD-10-CM

## 2022-01-09 DIAGNOSIS — I10 HYPERTENSION, UNSPECIFIED TYPE: ICD-10-CM

## 2022-01-09 DIAGNOSIS — Z78.9 IMPAIRED MOBILITY AND ADLS: ICD-10-CM

## 2022-01-09 DIAGNOSIS — R53.81 PHYSICAL DECONDITIONING: ICD-10-CM

## 2022-01-09 DIAGNOSIS — E13.11 DIABETIC KETOACIDOSIS WITH COMA ASSOCIATED WITH OTHER SPECIFIED DIABETES MELLITUS: Primary | ICD-10-CM

## 2022-01-09 DIAGNOSIS — Z74.09 IMPAIRED FUNCTIONAL MOBILITY, BALANCE, GAIT, AND ENDURANCE: ICD-10-CM

## 2022-01-09 DIAGNOSIS — R13.10 DYSPHAGIA, UNSPECIFIED TYPE: ICD-10-CM

## 2022-01-09 DIAGNOSIS — Z74.09 IMPAIRED MOBILITY AND ADLS: ICD-10-CM

## 2022-01-09 PROBLEM — I25.10 CAD (CORONARY ARTERY DISEASE): Status: RESOLVED | Noted: 2021-04-24 | Resolved: 2022-01-09

## 2022-01-09 PROBLEM — R65.10 SIRS (SYSTEMIC INFLAMMATORY RESPONSE SYNDROME) (HCC): Status: ACTIVE | Noted: 2022-01-09

## 2022-01-09 PROBLEM — E11.00 TYPE 2 DIABETES MELLITUS WITH HYPEROSMOLAR HYPERGLYCEMIC STATE (HHS): Status: ACTIVE | Noted: 2017-01-27

## 2022-01-09 PROBLEM — E11.10 DIABETIC ACIDOSIS: Status: ACTIVE | Noted: 2022-01-09

## 2022-01-09 PROBLEM — I50.31 ACUTE DIASTOLIC CHF (CONGESTIVE HEART FAILURE) (HCC): Status: ACTIVE | Noted: 2022-01-09

## 2022-01-09 PROBLEM — I50.30 UNSPECIFIED DIASTOLIC (CONGESTIVE) HEART FAILURE (HCC): Status: ACTIVE | Noted: 2021-10-11

## 2022-01-09 LAB
ABO GROUP BLD: NORMAL
ACETONE BLD QL: ABNORMAL
ALBUMIN SERPL-MCNC: 3.8 G/DL (ref 3.5–5.2)
ALBUMIN/GLOB SERPL: 0.9 G/DL
ALP SERPL-CCNC: 192 U/L (ref 39–117)
ALT SERPL W P-5'-P-CCNC: 7 U/L (ref 1–33)
AMPHET+METHAMPHET UR QL: NEGATIVE
AMPHETAMINES UR QL: NEGATIVE
ANION GAP SERPL CALCULATED.3IONS-SCNC: 21 MMOL/L (ref 5–15)
ANION GAP SERPL CALCULATED.3IONS-SCNC: 24 MMOL/L (ref 5–15)
ANION GAP SERPL CALCULATED.3IONS-SCNC: 25 MMOL/L (ref 5–15)
ANION GAP SERPL CALCULATED.3IONS-SCNC: 26 MMOL/L (ref 5–15)
APTT PPP: 23.2 SECONDS (ref 20–40.3)
ARTERIAL PATENCY WRIST A: ABNORMAL
AST SERPL-CCNC: 10 U/L (ref 1–32)
ATMOSPHERIC PRESS: 754 MMHG
BACTERIA UR QL AUTO: ABNORMAL /HPF
BARBITURATES UR QL SCN: NEGATIVE
BASE EXCESS BLDA CALC-SCNC: -7.5 MMOL/L (ref 0–2)
BASOPHILS # BLD AUTO: 0.08 10*3/MM3 (ref 0–0.2)
BASOPHILS NFR BLD AUTO: 0.4 % (ref 0–1.5)
BDY SITE: ABNORMAL
BENZODIAZ UR QL SCN: NEGATIVE
BILIRUB SERPL-MCNC: 0.2 MG/DL (ref 0–1.2)
BILIRUB UR QL STRIP: NEGATIVE
BLD GP AB SCN SERPL QL: NEGATIVE
BUN SERPL-MCNC: 44 MG/DL (ref 8–23)
BUN SERPL-MCNC: 46 MG/DL (ref 8–23)
BUN SERPL-MCNC: 47 MG/DL (ref 8–23)
BUN SERPL-MCNC: 47 MG/DL (ref 8–23)
BUN/CREAT SERPL: 12.1 (ref 7–25)
BUN/CREAT SERPL: 12.1 (ref 7–25)
BUN/CREAT SERPL: 12.7 (ref 7–25)
BUN/CREAT SERPL: 13.7 (ref 7–25)
BUPRENORPHINE SERPL-MCNC: NEGATIVE NG/ML
CALCIUM SPEC-SCNC: 8.7 MG/DL (ref 8.6–10.5)
CALCIUM SPEC-SCNC: 8.9 MG/DL (ref 8.6–10.5)
CALCIUM SPEC-SCNC: 9 MG/DL (ref 8.6–10.5)
CALCIUM SPEC-SCNC: 9.2 MG/DL (ref 8.6–10.5)
CANNABINOIDS SERPL QL: NEGATIVE
CHLORIDE SERPL-SCNC: 87 MMOL/L (ref 98–107)
CHLORIDE SERPL-SCNC: 89 MMOL/L (ref 98–107)
CHLORIDE SERPL-SCNC: 90 MMOL/L (ref 98–107)
CHLORIDE SERPL-SCNC: 94 MMOL/L (ref 98–107)
CLARITY UR: CLEAR
CO2 SERPL-SCNC: 17 MMOL/L (ref 22–29)
CO2 SERPL-SCNC: 18 MMOL/L (ref 22–29)
COCAINE UR QL: NEGATIVE
COLOR UR: YELLOW
CREAT SERPL-MCNC: 3.43 MG/DL (ref 0.57–1)
CREAT SERPL-MCNC: 3.46 MG/DL (ref 0.57–1)
CREAT SERPL-MCNC: 3.79 MG/DL (ref 0.57–1)
CREAT SERPL-MCNC: 3.87 MG/DL (ref 0.57–1)
D-LACTATE SERPL-SCNC: 2.5 MMOL/L (ref 0.5–2)
D-LACTATE SERPL-SCNC: 4.8 MMOL/L (ref 0.5–2)
DEPRECATED RDW RBC AUTO: 53.4 FL (ref 37–54)
EOSINOPHIL # BLD AUTO: 0 10*3/MM3 (ref 0–0.4)
EOSINOPHIL NFR BLD AUTO: 0 % (ref 0.3–6.2)
ERYTHROCYTE [DISTWIDTH] IN BLOOD BY AUTOMATED COUNT: 16 % (ref 12.3–15.4)
ETHANOL BLD-MCNC: <10 MG/DL (ref 0–10)
ETHANOL UR QL: <0.01 %
FLUAV SUBTYP SPEC NAA+PROBE: NOT DETECTED
FLUBV RNA ISLT QL NAA+PROBE: NOT DETECTED
GAS FLOW AIRWAY: 3 LPM
GFR SERPL CREATININE-BSD FRML MDRD: 12 ML/MIN/1.73
GFR SERPL CREATININE-BSD FRML MDRD: 12 ML/MIN/1.73
GFR SERPL CREATININE-BSD FRML MDRD: 13 ML/MIN/1.73
GFR SERPL CREATININE-BSD FRML MDRD: 14 ML/MIN/1.73
GFR SERPL CREATININE-BSD FRML MDRD: ABNORMAL ML/MIN/{1.73_M2}
GLOBULIN UR ELPH-MCNC: 4.2 GM/DL
GLUCOSE SERPL-MCNC: 711 MG/DL (ref 65–99)
GLUCOSE SERPL-MCNC: 721 MG/DL (ref 65–99)
GLUCOSE SERPL-MCNC: 783 MG/DL (ref 65–99)
GLUCOSE SERPL-MCNC: 800 MG/DL (ref 65–99)
GLUCOSE SERPL-MCNC: 865 MG/DL (ref 65–99)
GLUCOSE UR STRIP-MCNC: ABNORMAL MG/DL
HCO3 BLDA-SCNC: 17.8 MMOL/L (ref 20–26)
HCT VFR BLD AUTO: 22.1 % (ref 34–46.6)
HGB BLD-MCNC: 7 G/DL (ref 12–15.9)
HGB UR QL STRIP.AUTO: ABNORMAL
HOLD SPECIMEN: NORMAL
HYALINE CASTS UR QL AUTO: ABNORMAL /LPF
IMM GRANULOCYTES # BLD AUTO: 0.28 10*3/MM3 (ref 0–0.05)
IMM GRANULOCYTES NFR BLD AUTO: 1.6 % (ref 0–0.5)
INR PPP: 1.1 (ref 0.8–1.2)
KETONES UR QL STRIP: ABNORMAL
LEUKOCYTE ESTERASE UR QL STRIP.AUTO: NEGATIVE
LIPASE SERPL-CCNC: 54 U/L (ref 13–60)
LYMPHOCYTES # BLD AUTO: 1.44 10*3/MM3 (ref 0.7–3.1)
LYMPHOCYTES NFR BLD AUTO: 8.1 % (ref 19.6–45.3)
Lab: ABNORMAL
Lab: NORMAL
MAGNESIUM SERPL-MCNC: 1.9 MG/DL (ref 1.6–2.4)
MCH RBC QN AUTO: 28.7 PG (ref 26.6–33)
MCHC RBC AUTO-ENTMCNC: 31.7 G/DL (ref 31.5–35.7)
MCV RBC AUTO: 90.6 FL (ref 79–97)
METHADONE UR QL SCN: NEGATIVE
MODALITY: ABNORMAL
MONOCYTES # BLD AUTO: 0.62 10*3/MM3 (ref 0.1–0.9)
MONOCYTES NFR BLD AUTO: 3.5 % (ref 5–12)
NEUTROPHILS NFR BLD AUTO: 15.36 10*3/MM3 (ref 1.7–7)
NEUTROPHILS NFR BLD AUTO: 86.4 % (ref 42.7–76)
NITRITE UR QL STRIP: NEGATIVE
NRBC BLD AUTO-RTO: 0 /100 WBC (ref 0–0.2)
NT-PROBNP SERPL-MCNC: ABNORMAL PG/ML (ref 0–900)
OPIATES UR QL: NEGATIVE
OSMOLALITY SERPL: 341 MOSM/KG (ref 280–301)
OXYCODONE UR QL SCN: NEGATIVE
PCO2 BLDA: 34.9 MM HG (ref 35–45)
PCP UR QL SCN: NEGATIVE
PH BLDA: 7.32 PH UNITS (ref 7.35–7.45)
PH UR STRIP.AUTO: 7 [PH] (ref 5–9)
PHOSPHATE SERPL-MCNC: 3.2 MG/DL (ref 2.5–4.5)
PHOSPHATE SERPL-MCNC: 5.1 MG/DL (ref 2.5–4.5)
PHOSPHATE SERPL-MCNC: 6.1 MG/DL (ref 2.5–4.5)
PLATELET # BLD AUTO: 423 10*3/MM3 (ref 140–450)
PMV BLD AUTO: 9.1 FL (ref 6–12)
PO2 BLDA: 98.5 MM HG (ref 83–108)
POTASSIUM SERPL-SCNC: 3.4 MMOL/L (ref 3.5–5.2)
POTASSIUM SERPL-SCNC: 3.8 MMOL/L (ref 3.5–5.2)
POTASSIUM SERPL-SCNC: 3.9 MMOL/L (ref 3.5–5.2)
POTASSIUM SERPL-SCNC: 4 MMOL/L (ref 3.5–5.2)
PROCALCITONIN SERPL-MCNC: 0.29 NG/ML (ref 0–0.25)
PROPOXYPH UR QL: NEGATIVE
PROT SERPL-MCNC: 8 G/DL (ref 6–8.5)
PROT UR QL STRIP: ABNORMAL
PROTHROMBIN TIME: 14.1 SECONDS (ref 11.1–15.3)
RBC # BLD AUTO: 2.44 10*6/MM3 (ref 3.77–5.28)
RBC # UR STRIP: ABNORMAL /HPF
REF LAB TEST METHOD: ABNORMAL
RH BLD: POSITIVE
SAO2 % BLDCOA: 97.9 % (ref 94–99)
SARS-COV-2 RNA PNL SPEC NAA+PROBE: NOT DETECTED
SODIUM SERPL-SCNC: 129 MMOL/L (ref 136–145)
SODIUM SERPL-SCNC: 132 MMOL/L (ref 136–145)
SODIUM SERPL-SCNC: 133 MMOL/L (ref 136–145)
SODIUM SERPL-SCNC: 133 MMOL/L (ref 136–145)
SP GR UR STRIP: 1.02 (ref 1–1.03)
SQUAMOUS #/AREA URNS HPF: ABNORMAL /HPF
T&S EXPIRATION DATE: NORMAL
TRICYCLICS UR QL SCN: NEGATIVE
TROPONIN T SERPL-MCNC: 0.27 NG/ML (ref 0–0.03)
TROPONIN T SERPL-MCNC: 0.39 NG/ML (ref 0–0.03)
TROPONIN T SERPL-MCNC: 0.45 NG/ML (ref 0–0.03)
UROBILINOGEN UR QL STRIP: ABNORMAL
VENTILATOR MODE: ABNORMAL
WBC # UR STRIP: ABNORMAL /HPF
WBC NRBC COR # BLD: 17.78 10*3/MM3 (ref 3.4–10.8)
WHOLE BLOOD HOLD SPECIMEN: NORMAL
YEAST URNS QL MICRO: ABNORMAL /HPF

## 2022-01-09 PROCEDURE — 25010000002 HYDRALAZINE PER 20 MG: Performed by: NURSE PRACTITIONER

## 2022-01-09 PROCEDURE — 70450 CT HEAD/BRAIN W/O DYE: CPT

## 2022-01-09 PROCEDURE — 93005 ELECTROCARDIOGRAM TRACING: CPT | Performed by: STUDENT IN AN ORGANIZED HEALTH CARE EDUCATION/TRAINING PROGRAM

## 2022-01-09 PROCEDURE — 87040 BLOOD CULTURE FOR BACTERIA: CPT | Performed by: STUDENT IN AN ORGANIZED HEALTH CARE EDUCATION/TRAINING PROGRAM

## 2022-01-09 PROCEDURE — C1751 CATH, INF, PER/CENT/MIDLINE: HCPCS

## 2022-01-09 PROCEDURE — 94660 CPAP INITIATION&MGMT: CPT

## 2022-01-09 PROCEDURE — 36415 COLL VENOUS BLD VENIPUNCTURE: CPT

## 2022-01-09 PROCEDURE — 63710000001 INSULIN REGULAR HUMAN PER 5 UNITS: Performed by: STUDENT IN AN ORGANIZED HEALTH CARE EDUCATION/TRAINING PROGRAM

## 2022-01-09 PROCEDURE — P9612 CATHETERIZE FOR URINE SPEC: HCPCS

## 2022-01-09 PROCEDURE — 87150 DNA/RNA AMPLIFIED PROBE: CPT | Performed by: STUDENT IN AN ORGANIZED HEALTH CARE EDUCATION/TRAINING PROGRAM

## 2022-01-09 PROCEDURE — 93010 ELECTROCARDIOGRAM REPORT: CPT | Performed by: INTERNAL MEDICINE

## 2022-01-09 PROCEDURE — 94799 UNLISTED PULMONARY SVC/PX: CPT

## 2022-01-09 PROCEDURE — 82009 KETONE BODYS QUAL: CPT | Performed by: STUDENT IN AN ORGANIZED HEALTH CARE EDUCATION/TRAINING PROGRAM

## 2022-01-09 PROCEDURE — 86901 BLOOD TYPING SEROLOGIC RH(D): CPT | Performed by: STUDENT IN AN ORGANIZED HEALTH CARE EDUCATION/TRAINING PROGRAM

## 2022-01-09 PROCEDURE — 82962 GLUCOSE BLOOD TEST: CPT

## 2022-01-09 PROCEDURE — 36600 WITHDRAWAL OF ARTERIAL BLOOD: CPT

## 2022-01-09 PROCEDURE — 84145 PROCALCITONIN (PCT): CPT | Performed by: STUDENT IN AN ORGANIZED HEALTH CARE EDUCATION/TRAINING PROGRAM

## 2022-01-09 PROCEDURE — G0378 HOSPITAL OBSERVATION PER HR: HCPCS

## 2022-01-09 PROCEDURE — 99284 EMERGENCY DEPT VISIT MOD MDM: CPT

## 2022-01-09 PROCEDURE — 94761 N-INVAS EAR/PLS OXIMETRY MLT: CPT

## 2022-01-09 PROCEDURE — 83880 ASSAY OF NATRIURETIC PEPTIDE: CPT | Performed by: STUDENT IN AN ORGANIZED HEALTH CARE EDUCATION/TRAINING PROGRAM

## 2022-01-09 PROCEDURE — 84484 ASSAY OF TROPONIN QUANT: CPT | Performed by: STUDENT IN AN ORGANIZED HEALTH CARE EDUCATION/TRAINING PROGRAM

## 2022-01-09 PROCEDURE — 81001 URINALYSIS AUTO W/SCOPE: CPT | Performed by: STUDENT IN AN ORGANIZED HEALTH CARE EDUCATION/TRAINING PROGRAM

## 2022-01-09 PROCEDURE — 25010000002 VANCOMYCIN 10 G RECONSTITUTED SOLUTION: Performed by: STUDENT IN AN ORGANIZED HEALTH CARE EDUCATION/TRAINING PROGRAM

## 2022-01-09 PROCEDURE — 83735 ASSAY OF MAGNESIUM: CPT | Performed by: STUDENT IN AN ORGANIZED HEALTH CARE EDUCATION/TRAINING PROGRAM

## 2022-01-09 PROCEDURE — 25010000002 MORPHINE PER 10 MG: Performed by: STUDENT IN AN ORGANIZED HEALTH CARE EDUCATION/TRAINING PROGRAM

## 2022-01-09 PROCEDURE — 25010000002 SODIUM CHLORIDE 0.9 % WITH KCL 20 MEQ 20-0.9 MEQ/L-% SOLUTION: Performed by: STUDENT IN AN ORGANIZED HEALTH CARE EDUCATION/TRAINING PROGRAM

## 2022-01-09 PROCEDURE — 99220 PR INITIAL OBSERVATION CARE/DAY 70 MINUTES: CPT | Performed by: STUDENT IN AN ORGANIZED HEALTH CARE EDUCATION/TRAINING PROGRAM

## 2022-01-09 PROCEDURE — 87636 SARSCOV2 & INF A&B AMP PRB: CPT | Performed by: STUDENT IN AN ORGANIZED HEALTH CARE EDUCATION/TRAINING PROGRAM

## 2022-01-09 PROCEDURE — 0 INSULIN REGULAR HUMAN PER 5 UNITS: Performed by: STUDENT IN AN ORGANIZED HEALTH CARE EDUCATION/TRAINING PROGRAM

## 2022-01-09 PROCEDURE — 83690 ASSAY OF LIPASE: CPT | Performed by: STUDENT IN AN ORGANIZED HEALTH CARE EDUCATION/TRAINING PROGRAM

## 2022-01-09 PROCEDURE — 84100 ASSAY OF PHOSPHORUS: CPT | Performed by: STUDENT IN AN ORGANIZED HEALTH CARE EDUCATION/TRAINING PROGRAM

## 2022-01-09 PROCEDURE — 86850 RBC ANTIBODY SCREEN: CPT | Performed by: STUDENT IN AN ORGANIZED HEALTH CARE EDUCATION/TRAINING PROGRAM

## 2022-01-09 PROCEDURE — 25010000002 HEPARIN (PORCINE) PER 1000 UNITS: Performed by: STUDENT IN AN ORGANIZED HEALTH CARE EDUCATION/TRAINING PROGRAM

## 2022-01-09 PROCEDURE — 85610 PROTHROMBIN TIME: CPT | Performed by: STUDENT IN AN ORGANIZED HEALTH CARE EDUCATION/TRAINING PROGRAM

## 2022-01-09 PROCEDURE — 87340 HEPATITIS B SURFACE AG IA: CPT | Performed by: INTERNAL MEDICINE

## 2022-01-09 PROCEDURE — 25010000002 PIPERACILLIN SOD-TAZOBACTAM PER 1 G

## 2022-01-09 PROCEDURE — B548ZZA ULTRASONOGRAPHY OF SUPERIOR VENA CAVA, GUIDANCE: ICD-10-PCS | Performed by: HOSPITALIST

## 2022-01-09 PROCEDURE — 02HV33Z INSERTION OF INFUSION DEVICE INTO SUPERIOR VENA CAVA, PERCUTANEOUS APPROACH: ICD-10-PCS | Performed by: HOSPITALIST

## 2022-01-09 PROCEDURE — 85730 THROMBOPLASTIN TIME PARTIAL: CPT | Performed by: STUDENT IN AN ORGANIZED HEALTH CARE EDUCATION/TRAINING PROGRAM

## 2022-01-09 PROCEDURE — 36415 COLL VENOUS BLD VENIPUNCTURE: CPT | Performed by: STUDENT IN AN ORGANIZED HEALTH CARE EDUCATION/TRAINING PROGRAM

## 2022-01-09 PROCEDURE — 94640 AIRWAY INHALATION TREATMENT: CPT

## 2022-01-09 PROCEDURE — 80306 DRUG TEST PRSMV INSTRMNT: CPT | Performed by: STUDENT IN AN ORGANIZED HEALTH CARE EDUCATION/TRAINING PROGRAM

## 2022-01-09 PROCEDURE — 83605 ASSAY OF LACTIC ACID: CPT | Performed by: STUDENT IN AN ORGANIZED HEALTH CARE EDUCATION/TRAINING PROGRAM

## 2022-01-09 PROCEDURE — 82077 ASSAY SPEC XCP UR&BREATH IA: CPT | Performed by: STUDENT IN AN ORGANIZED HEALTH CARE EDUCATION/TRAINING PROGRAM

## 2022-01-09 PROCEDURE — 71045 X-RAY EXAM CHEST 1 VIEW: CPT

## 2022-01-09 PROCEDURE — 25010000002 PIPERACILLIN SOD-TAZOBACTAM PER 1 G: Performed by: CHIROPRACTOR

## 2022-01-09 PROCEDURE — 86900 BLOOD TYPING SEROLOGIC ABO: CPT | Performed by: STUDENT IN AN ORGANIZED HEALTH CARE EDUCATION/TRAINING PROGRAM

## 2022-01-09 PROCEDURE — 82803 BLOOD GASES ANY COMBINATION: CPT

## 2022-01-09 PROCEDURE — 80053 COMPREHEN METABOLIC PANEL: CPT | Performed by: STUDENT IN AN ORGANIZED HEALTH CARE EDUCATION/TRAINING PROGRAM

## 2022-01-09 PROCEDURE — 83930 ASSAY OF BLOOD OSMOLALITY: CPT | Performed by: STUDENT IN AN ORGANIZED HEALTH CARE EDUCATION/TRAINING PROGRAM

## 2022-01-09 PROCEDURE — 85025 COMPLETE CBC W/AUTO DIFF WBC: CPT | Performed by: STUDENT IN AN ORGANIZED HEALTH CARE EDUCATION/TRAINING PROGRAM

## 2022-01-09 RX ORDER — LISINOPRIL 5 MG/1
5 TABLET ORAL DAILY
Status: CANCELLED | OUTPATIENT
Start: 2022-01-09

## 2022-01-09 RX ORDER — ACETAMINOPHEN 160 MG/5ML
650 SOLUTION ORAL EVERY 4 HOURS PRN
Status: DISCONTINUED | OUTPATIENT
Start: 2022-01-09 | End: 2022-01-20 | Stop reason: HOSPADM

## 2022-01-09 RX ORDER — ALBUTEROL SULFATE 2.5 MG/3ML
2.5 SOLUTION RESPIRATORY (INHALATION) EVERY 4 HOURS PRN
Status: DISCONTINUED | OUTPATIENT
Start: 2022-01-09 | End: 2022-01-20 | Stop reason: HOSPADM

## 2022-01-09 RX ORDER — POLYETHYLENE GLYCOL 3350 17 G/17G
17 POWDER, FOR SOLUTION ORAL DAILY PRN
Status: DISCONTINUED | OUTPATIENT
Start: 2022-01-09 | End: 2022-01-20 | Stop reason: HOSPADM

## 2022-01-09 RX ORDER — MORPHINE SULFATE 2 MG/ML
1 INJECTION, SOLUTION INTRAMUSCULAR; INTRAVENOUS EVERY 4 HOURS PRN
Status: DISCONTINUED | OUTPATIENT
Start: 2022-01-09 | End: 2022-01-14

## 2022-01-09 RX ORDER — CLOPIDOGREL BISULFATE 75 MG/1
75 TABLET ORAL DAILY
Status: DISCONTINUED | OUTPATIENT
Start: 2022-01-09 | End: 2022-01-20 | Stop reason: HOSPADM

## 2022-01-09 RX ORDER — POTASSIUM CHLORIDE, DEXTROSE MONOHYDRATE AND SODIUM CHLORIDE 300; 5; 900 MG/100ML; G/100ML; MG/100ML
150 INJECTION, SOLUTION INTRAVENOUS CONTINUOUS PRN
Status: CANCELLED | OUTPATIENT
Start: 2022-01-09

## 2022-01-09 RX ORDER — SODIUM CHLORIDE 9 MG/ML
10 INJECTION, SOLUTION INTRAVENOUS CONTINUOUS PRN
Status: DISCONTINUED | OUTPATIENT
Start: 2022-01-09 | End: 2022-01-20 | Stop reason: HOSPADM

## 2022-01-09 RX ORDER — BUMETANIDE 1 MG/1
2 TABLET ORAL
Status: CANCELLED | OUTPATIENT
Start: 2022-01-09

## 2022-01-09 RX ORDER — LEVOTHYROXINE SODIUM 0.03 MG/1
25 TABLET ORAL DAILY
Status: DISCONTINUED | OUTPATIENT
Start: 2022-01-09 | End: 2022-01-20 | Stop reason: HOSPADM

## 2022-01-09 RX ORDER — LABETALOL HYDROCHLORIDE 5 MG/ML
20 INJECTION, SOLUTION INTRAVENOUS ONCE
Status: COMPLETED | OUTPATIENT
Start: 2022-01-09 | End: 2022-01-09

## 2022-01-09 RX ORDER — RANOLAZINE 500 MG/1
500 TABLET, EXTENDED RELEASE ORAL EVERY 12 HOURS SCHEDULED
Status: DISCONTINUED | OUTPATIENT
Start: 2022-01-09 | End: 2022-01-20 | Stop reason: HOSPADM

## 2022-01-09 RX ORDER — SODIUM CHLORIDE AND POTASSIUM CHLORIDE 300; 900 MG/100ML; MG/100ML
250 INJECTION, SOLUTION INTRAVENOUS CONTINUOUS PRN
Status: CANCELLED | OUTPATIENT
Start: 2022-01-09

## 2022-01-09 RX ORDER — DEXTROSE, SODIUM CHLORIDE, AND POTASSIUM CHLORIDE 5; .9; .15 G/100ML; G/100ML; G/100ML
150 INJECTION INTRAVENOUS CONTINUOUS PRN
Status: CANCELLED | OUTPATIENT
Start: 2022-01-09

## 2022-01-09 RX ORDER — PANTOPRAZOLE SODIUM 40 MG/10ML
40 INJECTION, POWDER, LYOPHILIZED, FOR SOLUTION INTRAVENOUS
Status: DISCONTINUED | OUTPATIENT
Start: 2022-01-10 | End: 2022-01-13

## 2022-01-09 RX ORDER — DEXTROSE MONOHYDRATE 25 G/50ML
12.5 INJECTION, SOLUTION INTRAVENOUS AS NEEDED
Status: CANCELLED | OUTPATIENT
Start: 2022-01-09

## 2022-01-09 RX ORDER — SODIUM CHLORIDE 0.9 % (FLUSH) 0.9 %
3 SYRINGE (ML) INJECTION EVERY 12 HOURS SCHEDULED
Status: CANCELLED | OUTPATIENT
Start: 2022-01-09

## 2022-01-09 RX ORDER — DEXTROSE AND SODIUM CHLORIDE 5; .9 G/100ML; G/100ML
50 INJECTION, SOLUTION INTRAVENOUS CONTINUOUS PRN
Status: DISCONTINUED | OUTPATIENT
Start: 2022-01-09 | End: 2022-01-10

## 2022-01-09 RX ORDER — ASPIRIN 81 MG/1
81 TABLET ORAL DAILY
Status: DISCONTINUED | OUTPATIENT
Start: 2022-01-09 | End: 2022-01-20 | Stop reason: HOSPADM

## 2022-01-09 RX ORDER — LISINOPRIL 5 MG/1
5 TABLET ORAL DAILY
Status: DISCONTINUED | OUTPATIENT
Start: 2022-01-09 | End: 2022-01-20 | Stop reason: HOSPADM

## 2022-01-09 RX ORDER — DEXTROSE AND SODIUM CHLORIDE 5; .45 G/100ML; G/100ML
50 INJECTION, SOLUTION INTRAVENOUS CONTINUOUS PRN
Status: DISCONTINUED | OUTPATIENT
Start: 2022-01-09 | End: 2022-01-10

## 2022-01-09 RX ORDER — AMOXICILLIN 250 MG
2 CAPSULE ORAL 2 TIMES DAILY
Status: DISCONTINUED | OUTPATIENT
Start: 2022-01-09 | End: 2022-01-20 | Stop reason: HOSPADM

## 2022-01-09 RX ORDER — BISACODYL 5 MG/1
5 TABLET, DELAYED RELEASE ORAL DAILY PRN
Status: CANCELLED | OUTPATIENT
Start: 2022-01-09

## 2022-01-09 RX ORDER — HYDRALAZINE HYDROCHLORIDE 20 MG/ML
10 INJECTION INTRAMUSCULAR; INTRAVENOUS EVERY 6 HOURS PRN
Status: DISCONTINUED | OUTPATIENT
Start: 2022-01-09 | End: 2022-01-20 | Stop reason: HOSPADM

## 2022-01-09 RX ORDER — SODIUM CHLORIDE, SODIUM LACTATE, POTASSIUM CHLORIDE, CALCIUM CHLORIDE 600; 310; 30; 20 MG/100ML; MG/100ML; MG/100ML; MG/100ML
INJECTION, SOLUTION INTRAVENOUS
Status: DISCONTINUED
Start: 2022-01-09 | End: 2022-01-20 | Stop reason: HOSPADM

## 2022-01-09 RX ORDER — BISACODYL 5 MG/1
5 TABLET, DELAYED RELEASE ORAL DAILY PRN
Status: DISCONTINUED | OUTPATIENT
Start: 2022-01-09 | End: 2022-01-20 | Stop reason: HOSPADM

## 2022-01-09 RX ORDER — ALBUTEROL SULFATE 90 UG/1
2 AEROSOL, METERED RESPIRATORY (INHALATION) EVERY 4 HOURS PRN
Status: DISCONTINUED | OUTPATIENT
Start: 2022-01-09 | End: 2022-01-09

## 2022-01-09 RX ORDER — SODIUM CHLORIDE 0.9 % (FLUSH) 0.9 %
3 SYRINGE (ML) INJECTION EVERY 12 HOURS SCHEDULED
Status: DISCONTINUED | OUTPATIENT
Start: 2022-01-09 | End: 2022-01-11

## 2022-01-09 RX ORDER — GABAPENTIN 300 MG/1
300 CAPSULE ORAL EVERY 8 HOURS SCHEDULED
Status: DISCONTINUED | OUTPATIENT
Start: 2022-01-09 | End: 2022-01-20 | Stop reason: HOSPADM

## 2022-01-09 RX ORDER — SODIUM CHLORIDE 9 MG/ML
250 INJECTION, SOLUTION INTRAVENOUS CONTINUOUS PRN
Status: CANCELLED | OUTPATIENT
Start: 2022-01-09

## 2022-01-09 RX ORDER — SODIUM CHLORIDE 0.9 % (FLUSH) 0.9 %
10 SYRINGE (ML) INJECTION AS NEEDED
Status: DISCONTINUED | OUTPATIENT
Start: 2022-01-09 | End: 2022-01-20 | Stop reason: HOSPADM

## 2022-01-09 RX ORDER — ACETAMINOPHEN 650 MG/1
650 SUPPOSITORY RECTAL EVERY 4 HOURS PRN
Status: DISCONTINUED | OUTPATIENT
Start: 2022-01-09 | End: 2022-01-20 | Stop reason: HOSPADM

## 2022-01-09 RX ORDER — POTASSIUM CHLORIDE, DEXTROSE MONOHYDRATE AND SODIUM CHLORIDE 300; 5; 900 MG/100ML; G/100ML; MG/100ML
50 INJECTION, SOLUTION INTRAVENOUS CONTINUOUS PRN
Status: DISCONTINUED | OUTPATIENT
Start: 2022-01-09 | End: 2022-01-10

## 2022-01-09 RX ORDER — ACETAMINOPHEN 325 MG/1
650 TABLET ORAL EVERY 4 HOURS PRN
Status: DISCONTINUED | OUTPATIENT
Start: 2022-01-09 | End: 2022-01-20 | Stop reason: HOSPADM

## 2022-01-09 RX ORDER — DEXTROSE AND SODIUM CHLORIDE 5; .45 G/100ML; G/100ML
150 INJECTION, SOLUTION INTRAVENOUS CONTINUOUS PRN
Status: CANCELLED | OUTPATIENT
Start: 2022-01-09

## 2022-01-09 RX ORDER — SODIUM CHLORIDE 0.9 % (FLUSH) 0.9 %
10 SYRINGE (ML) INJECTION AS NEEDED
Status: CANCELLED | OUTPATIENT
Start: 2022-01-09

## 2022-01-09 RX ORDER — POLYETHYLENE GLYCOL 3350 17 G/17G
17 POWDER, FOR SOLUTION ORAL DAILY PRN
Status: CANCELLED | OUTPATIENT
Start: 2022-01-09

## 2022-01-09 RX ORDER — METOPROLOL TARTRATE 50 MG/1
50 TABLET, FILM COATED ORAL EVERY 12 HOURS SCHEDULED
Status: CANCELLED | OUTPATIENT
Start: 2022-01-09

## 2022-01-09 RX ORDER — BISACODYL 10 MG
10 SUPPOSITORY, RECTAL RECTAL DAILY PRN
Status: CANCELLED | OUTPATIENT
Start: 2022-01-09

## 2022-01-09 RX ORDER — RANOLAZINE 500 MG/1
500 TABLET, EXTENDED RELEASE ORAL EVERY 12 HOURS SCHEDULED
Status: CANCELLED | OUTPATIENT
Start: 2022-01-09

## 2022-01-09 RX ORDER — SODIUM CHLORIDE 9 MG/ML
250 INJECTION, SOLUTION INTRAVENOUS CONTINUOUS PRN
Status: DISCONTINUED | OUTPATIENT
Start: 2022-01-09 | End: 2022-01-10

## 2022-01-09 RX ORDER — OXYBUTYNIN CHLORIDE 5 MG/1
5 TABLET, EXTENDED RELEASE ORAL DAILY
Status: DISCONTINUED | OUTPATIENT
Start: 2022-01-09 | End: 2022-01-18

## 2022-01-09 RX ORDER — ALBUTEROL SULFATE 90 UG/1
2 AEROSOL, METERED RESPIRATORY (INHALATION) EVERY 4 HOURS PRN
Status: CANCELLED | OUTPATIENT
Start: 2022-01-09

## 2022-01-09 RX ORDER — SODIUM CHLORIDE 450 MG/100ML
50 INJECTION, SOLUTION INTRAVENOUS CONTINUOUS PRN
Status: DISCONTINUED | OUTPATIENT
Start: 2022-01-09 | End: 2022-01-10

## 2022-01-09 RX ORDER — DEXTROSE, SODIUM CHLORIDE, AND POTASSIUM CHLORIDE 5; .9; .15 G/100ML; G/100ML; G/100ML
50 INJECTION INTRAVENOUS CONTINUOUS PRN
Status: DISCONTINUED | OUTPATIENT
Start: 2022-01-09 | End: 2022-01-10

## 2022-01-09 RX ORDER — SODIUM CHLORIDE AND POTASSIUM CHLORIDE 150; 450 MG/100ML; MG/100ML
50 INJECTION, SOLUTION INTRAVENOUS CONTINUOUS PRN
Status: DISCONTINUED | OUTPATIENT
Start: 2022-01-09 | End: 2022-01-10

## 2022-01-09 RX ORDER — LEVOTHYROXINE SODIUM 0.03 MG/1
25 TABLET ORAL DAILY
Status: CANCELLED | OUTPATIENT
Start: 2022-01-09

## 2022-01-09 RX ORDER — BISACODYL 10 MG
10 SUPPOSITORY, RECTAL RECTAL DAILY PRN
Status: DISCONTINUED | OUTPATIENT
Start: 2022-01-09 | End: 2022-01-20 | Stop reason: HOSPADM

## 2022-01-09 RX ORDER — LABETALOL HYDROCHLORIDE 5 MG/ML
20 INJECTION, SOLUTION INTRAVENOUS EVERY 6 HOURS PRN
Status: DISCONTINUED | OUTPATIENT
Start: 2022-01-09 | End: 2022-01-20 | Stop reason: HOSPADM

## 2022-01-09 RX ORDER — DEXTROSE MONOHYDRATE 25 G/50ML
12.5 INJECTION, SOLUTION INTRAVENOUS AS NEEDED
Status: DISCONTINUED | OUTPATIENT
Start: 2022-01-09 | End: 2022-01-20 | Stop reason: HOSPADM

## 2022-01-09 RX ORDER — SODIUM CHLORIDE AND POTASSIUM CHLORIDE 300; 900 MG/100ML; MG/100ML
50 INJECTION, SOLUTION INTRAVENOUS CONTINUOUS PRN
Status: DISCONTINUED | OUTPATIENT
Start: 2022-01-09 | End: 2022-01-10

## 2022-01-09 RX ORDER — SODIUM CHLORIDE 0.9 % (FLUSH) 0.9 %
10 SYRINGE (ML) INJECTION ONCE AS NEEDED
Status: CANCELLED | OUTPATIENT
Start: 2022-01-09

## 2022-01-09 RX ORDER — SODIUM CHLORIDE 0.9 % (FLUSH) 0.9 %
10 SYRINGE (ML) INJECTION EVERY 12 HOURS SCHEDULED
Status: CANCELLED | OUTPATIENT
Start: 2022-01-09

## 2022-01-09 RX ORDER — ALBUTEROL SULFATE 2.5 MG/3ML
2.5 SOLUTION RESPIRATORY (INHALATION) EVERY 4 HOURS PRN
Status: CANCELLED | OUTPATIENT
Start: 2022-01-09

## 2022-01-09 RX ORDER — METOPROLOL TARTRATE 50 MG/1
50 TABLET, FILM COATED ORAL EVERY 12 HOURS SCHEDULED
Status: DISCONTINUED | OUTPATIENT
Start: 2022-01-09 | End: 2022-01-13

## 2022-01-09 RX ORDER — DEXTROSE, SODIUM CHLORIDE, AND POTASSIUM CHLORIDE 5; .45; .15 G/100ML; G/100ML; G/100ML
50 INJECTION INTRAVENOUS CONTINUOUS PRN
Status: DISCONTINUED | OUTPATIENT
Start: 2022-01-09 | End: 2022-01-10

## 2022-01-09 RX ORDER — MONTELUKAST SODIUM 10 MG/1
10 TABLET ORAL NIGHTLY
Status: CANCELLED | OUTPATIENT
Start: 2022-01-09

## 2022-01-09 RX ORDER — ONDANSETRON 2 MG/ML
4 INJECTION INTRAMUSCULAR; INTRAVENOUS EVERY 6 HOURS PRN
Status: CANCELLED | OUTPATIENT
Start: 2022-01-09

## 2022-01-09 RX ORDER — DEXTROSE AND SODIUM CHLORIDE 5; .9 G/100ML; G/100ML
150 INJECTION, SOLUTION INTRAVENOUS CONTINUOUS PRN
Status: CANCELLED | OUTPATIENT
Start: 2022-01-09

## 2022-01-09 RX ORDER — GABAPENTIN 300 MG/1
300 CAPSULE ORAL EVERY 8 HOURS SCHEDULED
Status: CANCELLED | OUTPATIENT
Start: 2022-01-09

## 2022-01-09 RX ORDER — NYSTATIN 100000 [USP'U]/G
POWDER TOPICAL EVERY 12 HOURS SCHEDULED
Status: DISCONTINUED | OUTPATIENT
Start: 2022-01-09 | End: 2022-01-18 | Stop reason: SDUPTHER

## 2022-01-09 RX ORDER — OXYBUTYNIN CHLORIDE 5 MG/1
5 TABLET, EXTENDED RELEASE ORAL DAILY
Status: CANCELLED | OUTPATIENT
Start: 2022-01-09

## 2022-01-09 RX ORDER — ASPIRIN 81 MG/1
81 TABLET ORAL DAILY
Status: CANCELLED | OUTPATIENT
Start: 2022-01-09

## 2022-01-09 RX ORDER — SODIUM CHLORIDE AND POTASSIUM CHLORIDE 150; 900 MG/100ML; MG/100ML
250 INJECTION, SOLUTION INTRAVENOUS CONTINUOUS PRN
Status: CANCELLED | OUTPATIENT
Start: 2022-01-09

## 2022-01-09 RX ORDER — CLOPIDOGREL BISULFATE 75 MG/1
75 TABLET ORAL DAILY
Status: CANCELLED | OUTPATIENT
Start: 2022-01-09

## 2022-01-09 RX ORDER — ACETAMINOPHEN 325 MG/1
650 TABLET ORAL EVERY 4 HOURS PRN
Status: CANCELLED | OUTPATIENT
Start: 2022-01-09

## 2022-01-09 RX ORDER — ATORVASTATIN CALCIUM 20 MG/1
20 TABLET, FILM COATED ORAL DAILY
Status: DISCONTINUED | OUTPATIENT
Start: 2022-01-09 | End: 2022-01-20 | Stop reason: HOSPADM

## 2022-01-09 RX ORDER — ATORVASTATIN CALCIUM 20 MG/1
20 TABLET, FILM COATED ORAL DAILY
Status: CANCELLED | OUTPATIENT
Start: 2022-01-09

## 2022-01-09 RX ORDER — DEXTROSE, SODIUM CHLORIDE, AND POTASSIUM CHLORIDE 5; .45; .3 G/100ML; G/100ML; G/100ML
50 INJECTION INTRAVENOUS CONTINUOUS PRN
Status: DISCONTINUED | OUTPATIENT
Start: 2022-01-09 | End: 2022-01-10

## 2022-01-09 RX ORDER — MONTELUKAST SODIUM 10 MG/1
10 TABLET ORAL NIGHTLY
Status: DISCONTINUED | OUTPATIENT
Start: 2022-01-09 | End: 2022-01-20 | Stop reason: HOSPADM

## 2022-01-09 RX ORDER — DEXTROSE, SODIUM CHLORIDE, AND POTASSIUM CHLORIDE 5; .45; .15 G/100ML; G/100ML; G/100ML
150 INJECTION INTRAVENOUS CONTINUOUS PRN
Status: CANCELLED | OUTPATIENT
Start: 2022-01-09

## 2022-01-09 RX ORDER — HEPARIN SODIUM 5000 [USP'U]/ML
5000 INJECTION, SOLUTION INTRAVENOUS; SUBCUTANEOUS EVERY 8 HOURS SCHEDULED
Status: DISCONTINUED | OUTPATIENT
Start: 2022-01-09 | End: 2022-01-20 | Stop reason: HOSPADM

## 2022-01-09 RX ORDER — ISOSORBIDE MONONITRATE 30 MG/1
30 TABLET, EXTENDED RELEASE ORAL EVERY MORNING
Status: CANCELLED | OUTPATIENT
Start: 2022-01-10

## 2022-01-09 RX ORDER — DEXTROSE, SODIUM CHLORIDE, AND POTASSIUM CHLORIDE 5; .45; .3 G/100ML; G/100ML; G/100ML
150 INJECTION INTRAVENOUS CONTINUOUS PRN
Status: CANCELLED | OUTPATIENT
Start: 2022-01-09

## 2022-01-09 RX ORDER — SODIUM CHLORIDE AND POTASSIUM CHLORIDE 150; 900 MG/100ML; MG/100ML
250 INJECTION, SOLUTION INTRAVENOUS CONTINUOUS PRN
Status: DISCONTINUED | OUTPATIENT
Start: 2022-01-09 | End: 2022-01-10

## 2022-01-09 RX ORDER — IPRATROPIUM BROMIDE AND ALBUTEROL SULFATE 2.5; .5 MG/3ML; MG/3ML
3 SOLUTION RESPIRATORY (INHALATION)
Status: DISCONTINUED | OUTPATIENT
Start: 2022-01-09 | End: 2022-01-20 | Stop reason: HOSPADM

## 2022-01-09 RX ORDER — SODIUM CHLORIDE 0.9 % (FLUSH) 0.9 %
10 SYRINGE (ML) INJECTION ONCE AS NEEDED
Status: DISCONTINUED | OUTPATIENT
Start: 2022-01-09 | End: 2022-01-20 | Stop reason: HOSPADM

## 2022-01-09 RX ORDER — ONDANSETRON 2 MG/ML
4 INJECTION INTRAMUSCULAR; INTRAVENOUS EVERY 6 HOURS PRN
Status: DISCONTINUED | OUTPATIENT
Start: 2022-01-09 | End: 2022-01-20 | Stop reason: HOSPADM

## 2022-01-09 RX ORDER — ISOSORBIDE MONONITRATE 30 MG/1
30 TABLET, EXTENDED RELEASE ORAL EVERY MORNING
Status: DISCONTINUED | OUTPATIENT
Start: 2022-01-10 | End: 2022-01-20 | Stop reason: HOSPADM

## 2022-01-09 RX ORDER — SODIUM CHLORIDE 9 MG/ML
10 INJECTION, SOLUTION INTRAVENOUS CONTINUOUS PRN
Status: CANCELLED | OUTPATIENT
Start: 2022-01-09

## 2022-01-09 RX ORDER — PANTOPRAZOLE SODIUM 40 MG/10ML
40 INJECTION, POWDER, LYOPHILIZED, FOR SOLUTION INTRAVENOUS
Status: CANCELLED | OUTPATIENT
Start: 2022-01-10

## 2022-01-09 RX ORDER — IPRATROPIUM BROMIDE AND ALBUTEROL SULFATE 2.5; .5 MG/3ML; MG/3ML
3 SOLUTION RESPIRATORY (INHALATION)
Status: CANCELLED | OUTPATIENT
Start: 2022-01-09

## 2022-01-09 RX ORDER — SODIUM CHLORIDE 0.9 % (FLUSH) 0.9 %
10 SYRINGE (ML) INJECTION EVERY 12 HOURS SCHEDULED
Status: DISCONTINUED | OUTPATIENT
Start: 2022-01-09 | End: 2022-01-20 | Stop reason: HOSPADM

## 2022-01-09 RX ORDER — AMOXICILLIN 250 MG
2 CAPSULE ORAL 2 TIMES DAILY
Status: CANCELLED | OUTPATIENT
Start: 2022-01-09

## 2022-01-09 RX ORDER — SODIUM CHLORIDE AND POTASSIUM CHLORIDE 150; 450 MG/100ML; MG/100ML
250 INJECTION, SOLUTION INTRAVENOUS CONTINUOUS PRN
Status: CANCELLED | OUTPATIENT
Start: 2022-01-09

## 2022-01-09 RX ORDER — SODIUM CHLORIDE 450 MG/100ML
250 INJECTION, SOLUTION INTRAVENOUS CONTINUOUS PRN
Status: CANCELLED | OUTPATIENT
Start: 2022-01-09

## 2022-01-09 RX ORDER — BUMETANIDE 1 MG/1
2 TABLET ORAL
Status: DISCONTINUED | OUTPATIENT
Start: 2022-01-09 | End: 2022-01-10

## 2022-01-09 RX ADMIN — HUMAN INSULIN 10 UNITS: 100 INJECTION, SOLUTION SUBCUTANEOUS at 13:12

## 2022-01-09 RX ADMIN — SODIUM CHLORIDE 15 UNITS/HR: 900 INJECTION INTRAVENOUS at 21:58

## 2022-01-09 RX ADMIN — NYSTATIN: 100000 POWDER TOPICAL at 20:05

## 2022-01-09 RX ADMIN — HEPARIN SODIUM 5000 UNITS: 5000 INJECTION INTRAVENOUS; SUBCUTANEOUS at 21:24

## 2022-01-09 RX ADMIN — VANCOMYCIN HYDROCHLORIDE 2500 MG: 500 INJECTION, POWDER, LYOPHILIZED, FOR SOLUTION INTRAVENOUS at 16:06

## 2022-01-09 RX ADMIN — SODIUM CHLORIDE 13 UNITS/HR: 900 INJECTION INTRAVENOUS at 16:04

## 2022-01-09 RX ADMIN — SODIUM CHLORIDE, PRESERVATIVE FREE 3 ML: 5 INJECTION INTRAVENOUS at 20:04

## 2022-01-09 RX ADMIN — SODIUM CHLORIDE 8 UNITS/HR: 9 INJECTION, SOLUTION INTRAVENOUS at 15:03

## 2022-01-09 RX ADMIN — HYDRALAZINE HYDROCHLORIDE 10 MG: 20 INJECTION INTRAMUSCULAR; INTRAVENOUS at 16:28

## 2022-01-09 RX ADMIN — LABETALOL HYDROCHLORIDE 20 MG: 5 INJECTION, SOLUTION INTRAVENOUS at 17:37

## 2022-01-09 RX ADMIN — IPRATROPIUM BROMIDE AND ALBUTEROL SULFATE 3 ML: 2.5; .5 SOLUTION RESPIRATORY (INHALATION) at 17:28

## 2022-01-09 RX ADMIN — MORPHINE SULFATE 1 MG: 2 INJECTION, SOLUTION INTRAMUSCULAR; INTRAVENOUS at 17:37

## 2022-01-09 RX ADMIN — SODIUM CHLORIDE, PRESERVATIVE FREE 10 ML: 5 INJECTION INTRAVENOUS at 20:04

## 2022-01-09 RX ADMIN — POTASSIUM CHLORIDE AND SODIUM CHLORIDE 250 ML/HR: 900; 150 INJECTION, SOLUTION INTRAVENOUS at 16:10

## 2022-01-09 RX ADMIN — LABETALOL HYDROCHLORIDE 20 MG: 5 INJECTION, SOLUTION INTRAVENOUS at 12:55

## 2022-01-09 RX ADMIN — PIPERACILLIN SODIUM AND TAZOBACTAM SODIUM 3.38 G: 3; .375 INJECTION, POWDER, LYOPHILIZED, FOR SOLUTION INTRAVENOUS at 18:13

## 2022-01-09 NOTE — PROGRESS NOTES
TWO PATIENT IDENTIFIERS WERE USED. CONSENT WAS SIGNED PER FAMILY EDUCATION MATERIAL WAS GIVEN TO PATIENT AND / OR FAMILY. THE PATIENT WAS DRAPED WITH FULL BODY DRAPE AND PATIENT'S RIGHT ARM WAS PREPPED WITH CHLORAPREP.  ULTRASOUND WAS USED TO LOCALIZE THERIGHT BASILIC  VEIN. SUBCUTANEOUS TISSUE AT THE CATHETER SITE WAS INFILTRATED WITH 2% LIDOCAINE. UNDER ULTRASOUND GUIDANCE, THE VEIN WAS ACCESSED WITH A 21GAUGE  NEEDLE. AN 0.018 WIRE WAS THEN THREADED THROUGH THE NEEDLE INTO THE CENTRAL VENOUS SYSTEM. THE 21GAUGE  NEEDLE WAS REMOVED AND A 5 [place] PEEL AWAY SHEATH WAS PLACED OVER THE WIRE. THE PICC LINE CATHETER WAS CUT AT 37 CM. THE PICC LINE CATHETER WAS THEN PLACED OVER THE WIRE INTO THE VEIN, THE SHEATH WAS PEELED AWAY,WIRE WAS REMOVED. CATHETER WAS FLUSHED WITH NORMAL SALINE AND TIPS APPLIED. BIOPATCH PLACED. CATHETER SECURED WITH STATLOCK AND TEGADERM. PATIENT TOLERATED PROCEDURE WELL. THIS WAS DONE IN THE   ER      IMPRESSION: SUCCESSFUL PLACEMENT OF TRIPLE LUMEN SOLO PICC        Dalila Lopez  1/9/2022  12:26 CST

## 2022-01-09 NOTE — H&P
HISTORY AND PHYSICAL  NAME: Yamileth Slater  : 1960  MRN: [id number]    DATE OF ADMISSION:  2022     DATE & TIME SEEN: 22 at 1415    PCP: Rianna Macias MD    CODE STATUS: Full code    CHIEF COMPLAINT:  AMS    HPI:  Yamileth Slater is a 61 y.o. female with a CMH of CKD stage 4 on dialsysi MWF, CAD s/p CABG x3  uncontrolled DM type 2, HFpEF, GERD, HTN, HLD, MIKE on CPAP who presents to the ED with AMS. Patient is accompanied with  who states last known normal was around midnight. She went to bed around 9pm after dinner. She woke up multiple times through the night with increased urinary frequency and wet the bed. Around 7 am this morning patient's  noticed patient was altered and brought her to ED. She is a [facility] dialysis patient and completed her dialysis on Friday without difficulty.    ED course: Serum glucose 800, 1 L NS, CT Head wnl, EKG, sinus tachycardia, picc line placed, Zosyn/Vancomycin started, Insulin Drip ordered.           CONCURRENT MEDICAL HISTORY:  Past Medical History:   Diagnosis Date   • Acute blood loss anemia 2017    Likely due to gastric oozing at this time. - Dr. Duarte (GI) was consulted and has now signed off, will follow up outpatient - pill colonoscopy showed AVMs - continue to monitor   • Anxiety    • CAD (coronary artery disease) 2021    S/P 3 stents 2021 for BHL Continue ASA 81mg & Clopidogrel 75mg Continue Atorvastatin 40mg   • Carotid artery stenosis    • Chronic obstructive lung disease (HCC)    • CKD (chronic kidney disease) stage 4, GFR 15-29 ml/min ([facility])    • Colonic polyp    • Coronary arteriosclerosis    • Diabetes mellitus (HCC)    • Diabetic neuropathy (HCC)    • Ear pain, right 10/18/2021    - canal trauma due to patient scratching and DMT2 - added cortisporin ear drops   • Elevated troponin 10/12/2021    -most likely from CKD -Trending down -Neg chest pain   • GERD (gastroesophageal reflux disease)    • GI bleed  5/13/2021    - GI will follow up outpatient - Protonix 40mg daily - Avoid medical DVT prophy and use mechanical at this time instead. - Continue to monitor - pill colonoscopy results showed AVMs   • History of transfusion    • Hypercholesterolemia    • Hypertension    • Hypomagnesemia 6/27/2021    Monitor and replace   • Morbid obesity (HCC)    • Nephrolithiasis    • Peripheral vascular disease (HCC)    • Sleep apnea    • Substance abuse (HCC)    • Vitamin D deficiency        PAST SURGICAL HISTORY:  Past Surgical History:   Procedure Laterality Date   • CARDIAC CATHETERIZATION N/A 7/14/2020   • CARDIAC CATHETERIZATION N/A 4/23/2021    Procedure: Left Heart Cath;  Surgeon: Melba Romo MD;  Location: [facility] CATH INVASIVE LOCATION;  Service: Cardiology;  Laterality: N/A;   • CARDIAC CATHETERIZATION N/A 4/30/2021    Procedure: Percutaneous Coronary Intervention;  Surgeon: Russell Voss MD;  Location: [facility] CATH INVASIVE LOCATION;  Service: Cardiovascular;  Laterality: N/A;   • CARDIAC CATHETERIZATION N/A 4/30/2021    Procedure: Stent NIKKI coronary;  Surgeon: Russell Voss MD;  Location: [facility] INVASIVE LOCATION;  Service: Cardiovascular;  Laterality: N/A;   • CARDIAC CATHETERIZATION Left 11/13/2021    Procedure: Left Heart Cath;  Surgeon: Niall Rios MD;  Location: [facility] CATH INVASIVE LOCATION;  Service: Cardiology;  Laterality: Left;   • CAROTID STENT Left    • COLONOSCOPY     • COLONOSCOPY N/A 5/14/2021    Procedure: COLONOSCOPY;  Surgeon: Mingo Duarte MD;  Location: [facility] ENDOSCOPY;  Service: Gastroenterology;  Laterality: N/A;   • CORONARY ARTERY BYPASS GRAFT N/A 2013    CABG X 3   • CYSTOSCOPY BLADDER STONE LITHOTRIPSY Bilateral    • ENDOSCOPY N/A 4/12/2021    Procedure: ESOPHAGOGASTRODUODENOSCOPY;  Surgeon: Mingo Duarte MD;  Location: [facility] ENDOSCOPY;  Service: Gastroenterology;  Laterality: N/A;   • ENDOSCOPY N/A 5/14/2021    Procedure:  ESOPHAGOGASTRODUODENOSCOPY;  Surgeon: Mingo Duarte MD;  Location: [facility] ENDOSCOPY;  Service: Gastroenterology;  Laterality: N/A;   • INTERVENTIONAL RADIOLOGY PROCEDURE N/A 10/21/2021    Procedure: tunneled central venous catheter placement;  Surgeon: Donnie Robles MD;  Location: [facility] ANGIO INVASIVE LOCATION;  Service: Interventional Radiology;  Laterality: N/A;       FAMILY HISTORY:  Family History   Problem Relation Age of Onset   • Heart disease Mother    • Lung cancer Mother    • Heart disease Father    • Heart attack Father    • Diabetes Father    • Heart disease Half-Sister         Dad's side   • Heart disease Brother    • No Known Problems Sister    • No Known Problems Sister    • No Known Problems Sister    • No Known Problems Sister    • No Known Problems Sister    • Pancreatic cancer Half-Sister         Dad's side   • No Known Problems Brother    • No Known Problems Brother    • Heart attack Half-Brother    • Heart attack Half-Brother    • No Known Problems Maternal Grandmother    • No Known Problems Maternal Grandfather    • No Known Problems Paternal Grandmother    • No Known Problems Paternal Grandfather         SOCIAL HISTORY:  Social History     Socioeconomic History   • Marital status:      Spouse name: wesley dumont   Tobacco Use   • Smoking status: Former Smoker     Packs/day: 0.25     Years: 46.00     Pack years: 11.50     Types: Cigarettes   • Smokeless tobacco: Never Used   • Tobacco comment: only smoking 5 a day - quit april 23 2021   Substance and Sexual Activity   • Alcohol use: No   • Drug use: Not Currently     Types: LSD, Marijuana, Methamphetamines   • Sexual activity: Not Currently       HOME MEDICATIONS:  Prior to Admission medications    Medication Sig Start Date End Date Taking? Authorizing Provider   acetaminophen (Tylenol 8 Hour) 650 MG 8 hr tablet Take 1 tablet by mouth Every 8 (Eight) Hours As Needed for Mild Pain . 11/22/21  Yes Blake Burris MD    albuterol (PROVENTIL) (2.5 MG/3ML) 0.083% nebulizer solution Inhale the contents of 1 vial by nebulization Every 4 (Four) Hours As Needed for Wheezing. 10/28/21  Yes Haily Mcneil MD   albuterol sulfate  (90 Base) MCG/ACT inhaler Inhale 2 puffs Every 4 (Four) Hours As Needed for Wheezing. 3/26/21  Yes Nirmal Calvillo MD   aspirin (aspirin) 81 MG EC tablet Take 1 tablet by mouth Daily. 5/1/21  Yes Jr Jaime Estrada MD   atorvastatin (LIPITOR) 20 MG tablet TAKE ONE TABLET BY MOUTH EVERY NIGHT AT BEDTIME 12/10/21  Yes Blake Burris MD   clopidogrel (PLAVIX) 75 MG tablet Take 1 tablet by mouth Daily. 3/26/21  Yes Nirmal Calvillo MD   cyclobenzaprine (FLEXERIL) 10 MG tablet Take 1 tablet by mouth 2 (Two) Times a Day As Needed for Muscle Spasms. 3/26/21  Yes Nirmal Calvillo MD   fluticasone (FLONASE) 50 MCG/ACT nasal spray 2 sprays by Each Nare route Daily. 11/16/21  Yes Alvarado Mauricio MD   gabapentin (NEURONTIN) 800 MG tablet Take 1 tablet by mouth 3 (Three) Times a Day. 12/1/21  Yes Helder Lee MD   glucose blood test strip Use as instructed 10/9/20  Yes Nirmal Calvillo MD   insulin aspart (novoLOG FLEXPEN) 100 UNIT/ML solution pen-injector sc pen Inject 30 Units under the skin into the appropriate area as directed 3 (Three) Times a Day With Meals. 10/28/21  Yes Haily Mcneil MD   insulin detemir (LEVEMIR) 100 UNIT/ML injection Inject 60 Units under the skin into the appropriate area as directed Daily. 11/16/21  Yes Alvarado Mauricio MD   ipratropium-albuterol (DUO-NEB) 0.5-2.5 mg/3 ml nebulizer Take 3 mL by nebulization 4 (Four) Times a Day. 3/22/17  Yes Caio Thompson MD   lisinopril (PRINIVIL,ZESTRIL) 5 MG tablet TAKE 1 TABLET BY MOUTH DAILY. 12/15/21  Yes Blake Burris MD   montelukast (SINGULAIR) 10 MG tablet Take 1 tablet by mouth Every Night. 3/26/21  Yes Nirmal Calvillo MD   O2 (OXYGEN) Inhale 3 L/min Continuous. 1/1/21  Yes  Caio Thompson MD   oxybutynin XL (DITROPAN-XL) 5 MG 24 hr tablet Take 1 tablet by mouth Daily. 3/26/21  Yes Nirmal Calvillo MD   pantoprazole (PROTONIX) 40 MG EC tablet Take 1 tablet by mouth Every Night. 3/26/21  Yes Nirmal Calvillo MD   Blood Glucose Monitoring Suppl ([employer] Blood Glucose Meter) w/Device kit 1 each 3 (Three) Times a Day. 10/9/20   Nirmal Calvillo MD   bumetanide (BUMEX) 2 MG tablet Take 1 tablet by mouth 4 (Four) Times a Week. 12/16/21   Jerman Coffman MD   cetirizine (zyrTEC) 10 MG tablet Take 1 tablet by mouth Daily. 3/26/21   Nirmal Calvlilo MD   Continuous Blood Gluc  (FreeStyle Jade 2 [place]) device 1 each Continuous. 3/31/21   Nirmal Calvillo MD   Continuous Blood Gluc Sensor (FreeStyle Jade 2 Sensor) misc 1 each Every 14 (Fourteen) Days. 3/31/21   Nirmal Calvillo MD   DULoxetine (CYMBALTA) 20 MG capsule TAKE 1 CAPSULE BY MOUTH DAILY. 7/8/21   Nirmal Calvillo MD   EASY TOUCH PEN NEEDLES 31G X 8 MM misc  8/7/20   Caio Thompson MD   epoetin hubert-epbx (RETACRIT) 00223 UNIT/ML injection Inject 1 mL under the skin into the appropriate area as directed 3 (Three) Times a Week. Indications: ESRD on Dialysis 11/17/21   Alvarado Mauricio MD   ferrous sulfate (FeroSul) 325 (65 FE) MG tablet Take 1 tablet by mouth Daily With Breakfast. 3/26/21   Nirmal Calvillo MD   Insulin Pen Needle (NovoFine) 30G X 8 MM misc As directed 4 times daily 3/26/21   Nirmal Calvillo MD   Insulin Pen Needle 31G X 8 MM misc Use to inject insulin 4 (Four) Times a Day as directed. 10/28/21   Haily Mcneil MD   isosorbide mononitrate (IMDUR) 30 MG 24 hr tablet TAKE 1 TABLET BY MOUTH EVERY MORNING. 12/15/21   Blake Burris MD   levothyroxine (SYNTHROID, LEVOTHROID) 25 MCG tablet Take 25 mcg by mouth Daily. 9/1/21   Yadira Delarosa MD   lidocaine (LIDODERM) 5 % APPLY TO THE AFFECTED AREA(S) TOPICALLY TWO TIMES A DAY.  REMOVE NIGHTLY 10/8/21   Blake Burris MD   metoprolol tartrate (LOPRESSOR) 50 MG tablet TAKE ONE TABLET BY MOUTH TWO TIMES A DAY. HOLD IF PULSE IS LESS THAN 60 12/10/21   Blake Burris MD   nitroglycerin (NITROSTAT) 0.4 MG SL tablet Place 0.4 mg under the tongue Every 5 (Five) Minutes As Needed for Chest Pain (x 3 doses). 1/1/15   Caio Thompson MD   nystatin (MYCOSTATIN) 135277 UNIT/GM powder Apply  topically to the appropriate area as directed 3 (Three) Times a Day. 10/1/21   Carline Coffey MD   ondansetron ODT (Zofran ODT) 4 MG disintegrating tablet Place 1 tablet on the tongue Every 8 (Eight) Hours As Needed for Nausea or Vomiting. 3/26/21   Nirmal Calvillo MD   promethazine (PHENERGAN) 25 MG tablet Take 25 mg by mouth Every 6 (Six) Hours As Needed. 7/13/21   Caio Thompson MD   ranolazine (RANEXA) 500 MG 12 hr tablet TAKE 1 TABLET BY MOUTH EVERY 12 (TWELVE) HOURS. 12/15/21   Blake Burris MD   sennosides-docusate (PERICOLACE) 8.6-50 MG per tablet Take 2 tablets by mouth 2 (Two) Times a Day. 10/28/21   Haily Mcneil MD       ALLERGIES:  Adhesive tape and Other    REVIEW OF SYSTEMS  Review of Systems   Unable to perform ROS: Mental status change       PHYSICAL EXAM:  Temp:  [98.2 °F (36.8 °C)-101.2 °F (38.4 °C)] 101.2 °F (38.4 °C)  Heart Rate:  [102-108] 103  Resp:  [16-24] 24  BP: (168-211)/(69-95) 176/77  Body mass index is 45.83 kg/m².  Physical Exam  Vitals reviewed.   Constitutional:       General: She is in acute distress.      Appearance: She is toxic-appearing.   HENT:      Mouth/Throat:      Mouth: Mucous membranes are dry.   Cardiovascular:      Rate and Rhythm: Regular rhythm. Tachycardia present.      Pulses: Normal pulses.      Heart sounds: Normal heart sounds.   Pulmonary:      Breath sounds: Rhonchi present.   Abdominal:      General: Abdomen is flat.      Palpations: Abdomen is soft.   Neurological:      Mental Status: She is disoriented and confused.          DIAGNOSTIC DATA:   Lab Results (last 24 hours)     Procedure Component Value Units Date/Time    Phosphorus [[id number]]  (Abnormal) Collected: 01/09/22 1618    Specimen: Blood Updated: 01/09/22 1653     Phosphorus 5.1 mg/dL     Extra Tubes [[id number]] Collected: 01/09/22 1618    Specimen: Blood, Venous Line Updated: 01/09/22 1642    Narrative:      The following orders were created for panel order Extra Tubes.  Procedure                               Abnormality         Status                     ---------                               -----------         ------                     Green Top (Gel)[[id number]]                                  In process                 Green Top (Gel)[[id number]]                                  In process                   Please view results for these tests on the individual orders.    Green Top (Gel) [[id number]] Collected: 01/09/22 1618    Specimen: Blood Updated: 01/09/22 1642    Green Top (Gel) [[id number]] Collected: 01/09/22 1618    Specimen: Blood Updated: 01/09/22 1642    Troponin [[id number]] Collected: 01/09/22 1618    Specimen: Blood Updated: 01/09/22 1641    Extra Tubes [[id number]] Collected: 01/09/22 1627    Specimen: Blood, Venous Line Updated: 01/09/22 1627    Narrative:      The following orders were created for panel order Extra Tubes.  Procedure                               Abnormality         Status                     ---------                               -----------         ------                     Lavender Top[[id number]]                                     In process                   Please view results for these tests on the individual orders.    Lavender Top [[id number]] Collected: 01/09/22 1627    Specimen: Blood Updated: 01/09/22 1627    Blood Culture - Blood, Arm, Left [[id number]] Collected: 01/09/22 1618    Specimen: Blood from Arm, Left Updated: 01/09/22 1626    Blood Culture - Blood, Hand, Left [[id number]] Collected: 01/09/22 1619    Specimen:  Blood from Hand, Left Updated: 01/09/22 1626    Procalcitonin [[id number]] Collected: 01/09/22 1618    Specimen: Blood Updated: 01/09/22 1626    Basic Metabolic Panel [[id number]] Collected: 01/09/22 1618    Specimen: Blood Updated: 01/09/22 1626    STAT Lactic Acid, Reflex [[id number]] Collected: 01/09/22 1618    Specimen: Blood Updated: 01/09/22 1626    Osmolality, Serum [[id number]]  (Abnormal) Collected: 01/09/22 1214    Specimen: Blood Updated: 01/09/22 1541     Osmolality 341 mOsm/kg     Phosphorus [[id number]]  (Abnormal) Collected: 01/09/22 1214    Specimen: Blood Updated: 01/09/22 1527     Phosphorus 6.1 mg/dL     Urinalysis, Microscopic Only - Urine, Catheter [[id number]]  (Abnormal) Collected: 01/09/22 1450    Specimen: Urine, Catheter Updated: 01/09/22 1519     RBC, UA 0-2 /HPF      WBC, UA 0-2 /HPF      Bacteria, UA Trace /HPF      Squamous Epithelial Cells, UA 0-2 /HPF      Yeast, UA Small/1+ Yeast /HPF      Hyaline Casts, UA None Seen /LPF      Methodology Manual Light Microscopy    Urine Drug Screen - Urine, Clean Catch [[id number]]  (Normal) Collected: 01/09/22 1450    Specimen: Urine, Clean Catch Updated: 01/09/22 1505     THC, Screen, Urine Negative     Phencyclidine (PCP), Urine Negative     Cocaine Screen, Urine Negative     Methamphetamine, Ur Negative     Opiate Screen Negative     Amphetamine Screen, Urine Negative     Benzodiazepine Screen, Urine Negative     Tricyclic Antidepressants Screen Negative     Methadone Screen, Urine Negative     Barbiturates Screen, Urine Negative     Oxycodone Screen, Urine Negative     Propoxyphene Screen Negative     Buprenorphine, Screen, Urine Negative    Narrative:      Cutoff For Drugs Screened:    Amphetamines               500 ng/ml  Barbiturates               200 ng/ml  Benzodiazepines            150 ng/ml  Cocaine                    150 ng/ml  Methadone                  200 ng/ml  Opiates                    100 ng/ml  Phencyclidine               25 ng/ml  THC                             50 ng/ml  Methamphetamine            500 ng/ml  Tricyclic Antidepressants  300 ng/ml  Oxycodone                  100 ng/ml  Propoxyphene               300 ng/ml  Buprenorphine               10 ng/ml    The normal value for all drugs tested is negative. This report includes unconfirmed screening results, with the cutoff values listed, to be used for medical treatment purposes only.  Unconfirmed results must not be used for non-medical purposes such as employment or legal testing.  Clinical consideration should be applied to any drug of abuse test, particularly when unconfirmed results are used.      Urinalysis With Culture If Indicated - Urine, Catheter [[id number]]  (Abnormal) Collected: 01/09/22 1450    Specimen: Urine, Catheter Updated: 01/09/22 1500     Color, UA Yellow     Appearance, UA Clear     pH, UA 7.0     Specific Gravity, UA 1.022     Glucose, UA >=1000 mg/dL (3+)     Ketones, UA 15 mg/dL (1+)     Bilirubin, UA Negative     Blood, UA Small (1+)     Protein, UA >=300 mg/dL (3+)     Leuk Esterase, UA Negative     Nitrite, UA Negative     Urobilinogen, UA 0.2 E.U./dL    COVID-19 and FLU A/B PCR - Swab, Nasopharynx [[id number]]  (Normal) Collected: 01/09/22 1413    Specimen: Swab from Nasopharynx Updated: 01/09/22 1437     COVID19 Not Detected     Influenza A PCR Not Detected     Influenza B PCR Not Detected    Narrative:      Fact sheet for providers: https://www.fda.gov/media/881092/download    Fact sheet for patients: https://www.fda.gov/media/885196/download    Test performed by PCR.    Acetone [[id number]]  (Abnormal) Collected: 01/09/22 1214    Specimen: Blood Updated: 01/09/22 1348     Acetone Moderate    Lactic Acid, Plasma [[id number]]  (Abnormal) Collected: 01/09/22 1214    Specimen: Blood Updated: 01/09/22 1321     Lactate 2.5 mmol/L     Extra Tubes [[id number]] Collected: 01/09/22 1214    Specimen: Blood Updated: 01/09/22 1315    Narrative:      The following orders were  created for panel order Extra Tubes.  Procedure                               Abnormality         Status                     ---------                               -----------         ------                     Gold Top - [facility][[id number]]                                   Final result                 Please view results for these tests on the individual orders.    Gold Top - [facility] [[id number]] Collected: 01/09/22 1214    Specimen: Blood Updated: 01/09/22 1315     Extra Tube Hold for add-ons.     Comment: Auto resulted.       Comprehensive Metabolic Panel [[id number]]  (Abnormal) Collected: 01/09/22 1214    Specimen: Blood Updated: 01/09/22 1305     Glucose 800 mg/dL      BUN 44 mg/dL      Creatinine 3.46 mg/dL      Sodium 129 mmol/L      Potassium 4.0 mmol/L      Chloride 87 mmol/L      CO2 18.0 mmol/L      Calcium 9.0 mg/dL      Total Protein 8.0 g/dL      Albumin 3.80 g/dL      ALT (SGPT) 7 U/L      AST (SGOT) 10 U/L      Alkaline Phosphatase 192 U/L      Total Bilirubin 0.2 mg/dL      eGFR Non African Amer 13 mL/min/1.73      Comment: <15 Indicative of kidney failure.        eGFR   Amer --     Comment: <15 Indicative of kidney failure.        Globulin 4.2 gm/dL      A/G Ratio 0.9 g/dL      BUN/Creatinine Ratio 12.7     Anion Gap 24.0 mmol/L     Narrative:      GFR Normal >60  Chronic Kidney Disease <60  Kidney Failure <15      Troponin [[id number]]  (Abnormal) Collected: 01/09/22 1214    Specimen: Blood Updated: 01/09/22 1257     Troponin T 0.268 ng/mL     Narrative:      Troponin T Reference Range:  <= 0.03 ng/mL-   Negative for AMI  >0.03 ng/mL-     Abnormal for myocardial necrosis.  Clinicians would have to utilize clinical acumen, EKG, Troponin and serial changes to determine if it is an Acute Myocardial Infarction or myocardial injury due to an underlying chronic condition.       Results may be falsely decreased if patient taking Biotin.      aPTT [[id number]]  (Normal) Collected: 01/09/22 1214    Specimen:  Blood Updated: 01/09/22 1252     PTT 23.2 seconds     Narrative:      The recommended Heparin therapeutic range is 68-97 seconds.    Protime-INR [[id number]]  (Normal) Collected: 01/09/22 1214    Specimen: Blood Updated: 01/09/22 1252     Protime 14.1 Seconds      INR 1.10    Narrative:      Therapeutic range for most indications is 2.0-3.0 INR,  or 2.5-3.5 for mechanical heart valves.    Lipase [[id number]]  (Normal) Collected: 01/09/22 1214    Specimen: Blood Updated: 01/09/22 1251     Lipase 54 U/L     Ethanol [[id number]] Collected: 01/09/22 1214    Specimen: Blood Updated: 01/09/22 1251     Ethanol <10 mg/dL      Ethanol % <0.010 %     Magnesium [[id number]]  (Normal) Collected: 01/09/22 1214    Specimen: Blood Updated: 01/09/22 1251     Magnesium 1.9 mg/dL     BNP [[id number]]  (Abnormal) Collected: 01/09/22 1214    Specimen: Blood Updated: 01/09/22 1249     proBNP 18,229.0 pg/mL     Narrative:      Among patients with dyspnea, NT-proBNP is highly sensitive for the detection of acute congestive heart failure. In addition NT-proBNP of <300 pg/ml effectively rules out acute congestive heart failure with 99% negative predictive value.    Results may be falsely decreased if patient taking Biotin.      CBC & Differential [[id number]]  (Abnormal) Collected: 01/09/22 1214    Specimen: Blood Updated: 01/09/22 1230    Narrative:      The following orders were created for panel order CBC & Differential.  Procedure                               Abnormality         Status                     ---------                               -----------         ------                     CBC Auto Differential[[id number]]        Abnormal            Final result                 Please view results for these tests on the individual orders.    CBC Auto Differential [[id number]]  (Abnormal) Collected: 01/09/22 1214    Specimen: Blood Updated: 01/09/22 1230     WBC 17.78 10*3/mm3      RBC 2.44 10*6/mm3      Hemoglobin 7.0 g/dL      Hematocrit  22.1 %      MCV 90.6 fL      MCH 28.7 pg      MCHC 31.7 g/dL      RDW 16.0 %      RDW-SD 53.4 fl      MPV 9.1 fL      Platelets 423 10*3/mm3      Neutrophil % 86.4 %      Lymphocyte % 8.1 %      Monocyte % 3.5 %      Eosinophil % 0.0 %      Basophil % 0.4 %      Immature Grans % 1.6 %      Neutrophils, Absolute 15.36 10*3/mm3      Lymphocytes, Absolute 1.44 10*3/mm3      Monocytes, Absolute 0.62 10*3/mm3      Eosinophils, Absolute 0.00 10*3/mm3      Basophils, Absolute 0.08 10*3/mm3      Immature Grans, Absolute 0.28 10*3/mm3      nRBC 0.0 /100 WBC     Blood Culture - Blood, Arm, Left [[id number]] Collected: 01/09/22 1214    Specimen: Blood from Arm, Left Updated: 01/09/22 1218    Blood Gas, Arterial - [[id number]]  (Abnormal) Collected: 01/09/22 1113    Specimen: Arterial Blood Updated: 01/09/22 1131     Site Left Brachial     Shadi's Test N/A     pH, Arterial 7.316 pH units      Comment: 84 Value below reference range        pCO2, Arterial 34.9 mm Hg      Comment: 84 Value below reference range        pO2, Arterial 98.5 mm Hg      HCO3, Arterial 17.8 mmol/L      Comment: 84 Value below reference range        Base Excess, Arterial -7.5 mmol/L      Comment: 84 Value below reference range        O2 Saturation, Arterial 97.9 %      Barometric Pressure for Blood Gas 754 mmHg      Modality Nasal Cannula     Flow Rate 3.0 lpm      Ventilator Mode NA     Collected by KS     Comment: Meter: [id number]     :  [id number]              Imaging Results (Last 24 Hours)     Procedure Component Value Units Date/Time    US Guidance PICC NC [[id number]] Resulted: 01/09/22 1251     Updated: 01/09/22 1251    Narrative:      This procedure was auto-finalized with no dictation required.    XR Chest Post CVA Port [[id number]] Collected: 01/09/22 1220     Updated: 01/09/22 1236    Narrative:        PORTABLE CHEST    HISTORY: PICC line placement    Portable AP upright film of the chest was obtained at 12:05 PM.  COMPARISON: 10:36  AM    FINDINGS:   Right PICC line now in place with tip projected over the proximal  superior vena cava.  Right internal jugular multilumen catheter remains in place with  tip projected over the proximal superior vena cava.  Sternotomy.  Cardiomegaly with minimal pulmonary vascular congestion and  interstitial edema.  Perhaps very small right pleural effusion.  Old granulomatous disease.  No pneumothorax.  No acute osseous abnormality.  Degenerative changes are present in the thoracic spine.      Impression:      CONCLUSION:  Right PICC line now in place with tip projected over the proximal  superior vena cava.  Right internal jugular multilumen catheter remains in place with  tip projected over the proximal superior vena cava.  Sternotomy.  Cardiomegaly with minimal pulmonary vascular congestion and  interstitial edema.  Perhaps very small right pleural effusion.    [id number]    Electronically signed by:  Jon Patricia MD  1/9/2022 12:35 PM CST  Workstation: [facility] PICC W Imaging Guidance [[id number]] Resulted: 01/09/22 1227     Updated: 01/09/22 1227    Narrative:      This procedure was auto-finalized with no dictation required.    XR Chest 1 View [[id number]] Collected: 01/09/22 1100     Updated: 01/09/22 1113    Narrative:      EXAM: XR CHEST 1 VIEW    COMPARISONS: Radiograph 12/27/2021    INDICATION: AMS    FINDINGS:  Frontal view of the chest.    Lungs are symmetric and adequately expanded. No focal  consolidation, effusion, or pneumothorax. No change in mildly  prominent central pulmonary vasculature and interstitial  markings. Stable cardiomegaly. No acute osseous abnormalities.  Sternotomy wires are again seen. Right IJ dialysis catheter is  seen.      Impression:      Unchanged cardiomegaly with probable mild pulmonary edema.    Electronically signed by:  Kamar Khanna MD  1/9/2022  11:12 AM CST Workstation: [id number]    CT Head Without Contrast [[id number]] Collected: 01/09/22 1035      Updated: 01/09/22 1053    Narrative:      EXAM: CT HEAD WITHOUT IV CONTRAST    COMPARISONS: CT head 3/31/2021    INDICATION: AMS    TECHNIQUE: Noncontrast CT images were obtained of the brain.  Reformats were provided. All CT scans at this facility uses dose  modulation, iterative reconstruction, and/or weight-based dosing  when appropriate to achieve a radiation dose as low as reasonably  achievable.    FINDINGS:    No intracranial hemorrhage, appreciable mass, mass effect or  midline shift.     There is preservation of the gray-white matter differentiation.  No dense MCA or insular ribbon sign.    There is cerebral volume loss with ex vacuo ventricular  dilatation. Confluent and patchy periventricular hypodensities  are likely sequela of chronic ischemic microvascular disease.    Calvarium is intact. Paranasal sinuses are unremarkable. Mastoid  air cells are clear. Orbits are unremarkable.      Impression:      No acute intracranial process.    Unchanged sequela of chronic ischemic microvascular disease and  cerebral volume loss.    Electronically signed by:  Kamar Khanna MD  1/9/2022  10:52 AM CST Workstation: [id number]            I reviewed the patient's new clinical results.    ASSESSMENT AND PLAN: This is a 61 y.o. female with:    Active Hospital Problems    Diagnosis  POA   • **Type 2 diabetes mellitus with hyperosmolar hyperglycemic state (HHS) ([facility]) [E11.00, E11.65]  Yes     Priority: High     - Admission glucose 800, anion gap 24, pH 7.3, CO2 34.9  - A1c (oct 2021) - 8.8  -HHS protocol started.   - Insulin Drip    - 1L fluids in ED. Caution with fluids as dialysis patient   - BMP, Phosphorus, Magnesium [id number]   - Moderate Serum Acetone   -Beta hydroxybutyrate pending   -NPO   - Nephrology consulted. Appreciate recommendations  - Picc line in place  - Hold home Diabetes meds  - Dialysis patient MWF (received last dialysis on Friday 1/7/2022)  - Magnesium on admission 1.9  - Protonix 40mg stress  ulcer prophylaxis      • SIRS (systemic inflammatory response syndrome) ([facility]) [R65.10]  Yes     Priority: High     Admission   - WBC 17.78    -    - RR 16  - CXR - Mild pulm edema  - Blood cultures pending x2  - Procalcitonin pending  - UA : glucose 1000, negative Leucocytes/nitrate  - Empiric Zosyn and Vancomcyin      • Altered mental status [R41.82]  Yes     - AMS on presentation  - initial ABG pH 7.3, CO2 34  - UA negative for acute cysitits  -CTA head wnl   - Empiric Zosyn and Vancomycin  -Lactate 2.5 on admission   - blood cultures pending       • Personal history of noncompliance with medical treatment, presenting hazards to health [Z91.19]  Not Applicable     · Difficulty showing up to clinic visits due to complexity of medical problems and transportation  · Would really benefit from a couple home visits from PCP to help improve compliance      • Anemia due to chronic kidney disease, on chronic dialysis ([facility]) [N18.6, D63.1, Z99.2]  Not Applicable     - MWF Dialysis patient  - Epoetin (Retacrit) 10,000 units three times a week on dialysis days MWF  - Hg 7 on admission. Transfuse if Hg less than 7  - Type and screen done   - Daily CBC   -Cr. 3.4 on admission         • Unspecified diastolic (congestive) heart failure ([facility]) [I50.30]  Yes     - Continue Imdur, metoprolol, lisinopril, bumex  - Most recent echo 10/2021 : EF 63%  - pro-BNP 32854  -Troponin 0.2, Trend x2   - MWF Dialysis patient, caution with HHS fluids      • MIKE and COPD overlap syndrome ([facility]) [G47.33, J44.9]  Yes     · Home Trilogy/CPAP  · Home 3L oxygen dependent. Maintain strict Sats between 88-92%      • Hypothyroidism [E03.9]  Yes     Continue home Levothyroxine 25mcg, stable     • Coronary artery disease [I25.10]  Yes     · S/P CABG x 3 (Primary), Bilateral carotid artery stenosis w/ 2 stents on left side,  PAD (peripheral artery disease) with stent in right upper leg  · Continue   - aspirin  - Plavix 75mg daily  - atorvastatin 20 mg  daily  - lisinopril 5mg daily  -lopressor 50mg   -Imdur 30mg daily  - Ranexa - 500mg BID   · Troponin 0.26, Trend x2   · EKG - sinus tachycardia             • COPD (chronic obstructive pulmonary disease) ([facility]) [J44.9]  Yes     - Dependent on 3L NC at Home  - Maintain strict sats between 88-92%  -Singulair 10mg   -DuoNebs  -NIPPV via Respiratory. Patient uses Trilogy at home/night     • Hypertension [I10]  Yes     · Elevated BP in ER   · Metoprolol 50mg bid, hold if HR < 60bpm  · Lisinopril 5mg daily  · Telemetry  · Hydralazine 10mg prn q6h for SBP > 170  · Labetalol 20mg Q6h >160           DVT prophylaxis: SCDs/TEDs     Yamileth Slater and I have discussed pain goals for this hospitalization after reviewing her current clinical condition, medical history and prior pain experiences.  The goal is to keep the pain level minimal.  To help achieve this, I plan to pain medications.    JULIAN # , reviewed and consistent with patient reported medications.    Expected Length of Stay: Where: home and When:  3-4days    I discussed the patient's findings and my recommendations with family.     Kit Brar MD is the attending on record at time of admission, He is aware of the patient's status and agrees with the above history and physical.        This document has been electronically signed by Luz Quijano MD on January 9, 2022 16:54 CST

## 2022-01-09 NOTE — CONSULTS
[facility]  [address] [place] [address]. [address]   - [phone]  F - [phone]     Consultation         PATIENT  DEMOGRAPHICS   PATIENT NAME: Yamileth Slater                      PHYSICIAN: BRIDGETT Hadley  : 1960  MRN: [id number]    Subjective   SUBJECTIVE   Referring Provider: Dr. Morillo  Reason for Consultation: ESRD on HD  History of present illness:  This is a 61-year-old female with a past medical history significant for COPD, CKD/ESRD, CAD, DM 2, hypertension, morbid obesity, PVD who presented to the hospital with altered mental status. Her  reports that she was at her baseline last night but was severely altered and lethargic this morning when he awoke. On evaluation here she is found to be in DKA or HHS and is being admitted for further management. Nephrology is consulted to manage ESRD on HD.    Past Medical History:   Diagnosis Date   • Acute blood loss anemia 2017    Likely due to gastric oozing at this time. - Dr. Duarte (GI) was consulted and has now signed off, will follow up outpatient - pill colonoscopy showed AVMs - continue to monitor   • Anxiety    • Carotid artery stenosis    • Chronic obstructive lung disease (HCC)    • CKD (chronic kidney disease) stage 4, GFR 15-29 ml/min (HCC)    • Colonic polyp    • Coronary arteriosclerosis    • Diabetes mellitus (HCC)    • Diabetic neuropathy (HCC)    • Ear pain, right 10/18/2021    - canal trauma due to patient scratching and DMT2 - added cortisporin ear drops   • Elevated troponin 10/12/2021    -most likely from CKD -Trending down -Neg chest pain   • GERD (gastroesophageal reflux disease)    • GI bleed 2021    - GI will follow up outpatient - Protonix 40mg daily - Avoid medical DVT prophy and use mechanical at this time instead. - Continue to monitor - pill colonoscopy results showed AVMs   • History of transfusion    • Hypercholesterolemia    • Hypertension    • Hypomagnesemia  6/27/2021    Monitor and replace   • Morbid obesity (HCC)    • Nephrolithiasis    • Peripheral vascular disease (HCC)    • Sleep apnea    • Substance abuse (HCC)    • Vitamin D deficiency      Past Surgical History:   Procedure Laterality Date   • CARDIAC CATHETERIZATION N/A 7/14/2020   • CARDIAC CATHETERIZATION N/A 4/23/2021    Procedure: Left Heart Cath;  Surgeon: Melba Romo MD;  Location: [facility] CATH INVASIVE LOCATION;  Service: Cardiology;  Laterality: N/A;   • CARDIAC CATHETERIZATION N/A 4/30/2021    Procedure: Percutaneous Coronary Intervention;  Surgeon: Russell Voss MD;  Location: [facility] CATH INVASIVE LOCATION;  Service: Cardiovascular;  Laterality: N/A;   • CARDIAC CATHETERIZATION N/A 4/30/2021    Procedure: Stent NIKKI coronary;  Surgeon: Russell Voss MD;  Location: [facility] CATH INVASIVE LOCATION;  Service: Cardiovascular;  Laterality: N/A;   • CARDIAC CATHETERIZATION Left 11/13/2021    Procedure: Left Heart Cath;  Surgeon: Niall Rios MD;  Location: [facility] CATH INVASIVE LOCATION;  Service: Cardiology;  Laterality: Left;   • CAROTID STENT Left    • COLONOSCOPY     • COLONOSCOPY N/A 5/14/2021    Procedure: COLONOSCOPY;  Surgeon: Mingo Duarte MD;  Location: [facility] ENDOSCOPY;  Service: Gastroenterology;  Laterality: N/A;   • CORONARY ARTERY BYPASS GRAFT N/A 2013    CABG X 3   • CYSTOSCOPY BLADDER STONE LITHOTRIPSY Bilateral    • ENDOSCOPY N/A 4/12/2021    Procedure: ESOPHAGOGASTRODUODENOSCOPY;  Surgeon: Mingo Duarte MD;  Location: [facility] ENDOSCOPY;  Service: Gastroenterology;  Laterality: N/A;   • ENDOSCOPY N/A 5/14/2021    Procedure: ESOPHAGOGASTRODUODENOSCOPY;  Surgeon: Mingo Duarte MD;  Location: [facility] ENDOSCOPY;  Service: Gastroenterology;  Laterality: N/A;   • INTERVENTIONAL RADIOLOGY PROCEDURE N/A 10/21/2021    Procedure: tunneled central venous catheter placement;  Surgeon: Donnie Robles MD;  Location: [facility] ANGIO INVASIVE LOCATION;   "Service: Interventional Radiology;  Laterality: N/A;     Family History   Problem Relation Age of Onset   • Heart disease Mother    • Lung cancer Mother    • Heart disease Father    • Heart attack Father    • Diabetes Father    • Heart disease Half-Sister         Dad's side   • Heart disease Brother    • No Known Problems Sister    • No Known Problems Sister    • No Known Problems Sister    • No Known Problems Sister    • No Known Problems Sister    • Pancreatic cancer Half-Sister         Dad's side   • No Known Problems Brother    • No Known Problems Brother    • Heart attack Half-Brother    • Heart attack Half-Brother    • No Known Problems Maternal Grandmother    • No Known Problems Maternal Grandfather    • No Known Problems Paternal Grandmother    • No Known Problems Paternal Grandfather      Social History     Tobacco Use   • Smoking status: Former Smoker     Packs/day: 0.25     Years: 46.00     Pack years: 11.50     Types: Cigarettes   • Smokeless tobacco: Never Used   • Tobacco comment: only smoking 5 a day - quit april 23 2021   Substance Use Topics   • Alcohol use: No   • Drug use: Not Currently     Types: LSD, Marijuana, Methamphetamines     Allergies:  Adhesive tape and Other     REVIEW OF SYSTEMS    Review of Systems   Unable to perform ROS: Mental status change       Objective   OBJECTIVE   Vital Signs  Temp:  [98.2 °F (36.8 °C)] 98.2 °F (36.8 °C)  Heart Rate:  [102-108] 106  Resp:  [16-24] 16  BP: (168-211)/(69-95) 168/69    Flowsheet Rows      First Filed Value   Admission Height 167.6 cm (66\") Documented at 01/09/2022 1304   Admission Weight 132 kg (291 lb 9.6 oz) Documented at 01/09/2022 1048           No intake/output data recorded.    PHYSICAL EXAM    Physical Exam  Constitutional:       General: She is in acute distress.      Appearance: She is well-developed. She is ill-appearing.   HENT:      Head: Normocephalic and atraumatic.   Eyes:      Conjunctiva/sclera: Conjunctivae normal.      Pupils: " Pupils are equal, round, and reactive to light.   Cardiovascular:      Rate and Rhythm: Normal rate and regular rhythm.   Pulmonary:      Effort: Pulmonary effort is normal.      Breath sounds: Normal breath sounds.   Abdominal:      Palpations: Abdomen is soft.   Musculoskeletal:      Cervical back: Neck supple.      Right lower leg: No edema.      Left lower leg: No edema.   Skin:     General: Skin is warm and dry.   Neurological:      Mental Status: She is disoriented and confused.   Psychiatric:         Mood and Affect: Mood normal.         Behavior: Behavior normal.         RESULTS   Results Review:    Results from last 7 days   Lab Units 01/09/22  1214   SODIUM mmol/L 129*   POTASSIUM mmol/L 4.0   CHLORIDE mmol/L 87*   CO2 mmol/L 18.0*   BUN mg/dL 44*   CREATININE mg/dL 3.46*   CALCIUM mg/dL 9.0   BILIRUBIN mg/dL 0.2   ALK PHOS U/L 192*   ALT (SGPT) U/L 7   AST (SGOT) U/L 10   GLUCOSE mg/dL 800*       Estimated Creatinine Clearance: 23.5 mL/min (A) (by C-G formula based on SCr of 3.46 mg/dL (H)).    Results from last 7 days   Lab Units 01/09/22  1214   MAGNESIUM mg/dL 1.9             Results from last 7 days   Lab Units 01/09/22  1214   WBC 10*3/mm3 17.78*   HEMOGLOBIN g/dL 7.0*   PLATELETS 10*3/mm3 423       Results from last 7 days   Lab Units 01/09/22  1214   INR  1.10        MEDICATIONS    piperacillin-tazobactam, 3.375 g, Intravenous, Once  vancomycin, 2,500 mg, Intravenous, Once      insulin, 8 Units/hr      (Not in a hospital admission)    Assessment/Plan   ASSESSMENT / PLAN      Diabetic acidosis (HCC)    1.  ESRD on HD- Initiated HD on 10/21 due to hyperkalemia and fluid overload, now likely ESRD.  HD MWF for now.  Next HD tomorrow. No urgent indication for HD today.      2.  Hyponatremia- Na 129 in the setting of severe hyperglycemia, hyperosmolar hyponatremia.     3.  HHS-started on insulin drip, managed per primary team. Pt has received ivf bolus in ER. Would suggest 50ml/h of ivf if needed bc of  lack of osmotic diuresis in ESRD patient.      4.  History of CAD     5.  History of COPD - On trilogy at night but did not use it last night     6.  Anemia / previous GI bleed / b12 deficiency-  s/p capsule endoscopy which showed few small non-bleeding intestinal AVMs and single small polyp. Will likely require transfusion soon as her Hgb is 7 and will likely dilute further.     7.  HTN- Continue home antihypertensives when she can tolerate oral meds. Add PRN IV antihypertensives for now.    Thank you for the consult, we will continue to follow this patient.         I discussed the patients findings and my recommendations with family and primary care team      This document has been electronically signed by BRIDGETT Hadley on January 9, 2022 14:42 CST      For this patient encounter, I have reviewed the Nurse Practitioner's documentation, medical decision making, and treatment plan and personally spent time with the patient.

## 2022-01-09 NOTE — ED PROVIDER NOTES
Subjective   61-year-old female on dialysis comes to the ER chief complaint of new altered mental status this morning when she woke up.  Last known normal was around midnight per the .     states the patient wears a trilogy at home, but did not wear it last night.      History provided by:  EMS personnel and spouse  History limited by:  Mental status change   used: No        Review of Systems   Unable to perform ROS: Mental status change       Past Medical History:   Diagnosis Date   • Acute blood loss anemia 4/16/2017    Likely due to gastric oozing at this time. - Dr. Duarte (GI) was consulted and has now signed off, will follow up outpatient - pill colonoscopy showed AVMs - continue to monitor   • Anxiety    • Carotid artery stenosis    • Chronic obstructive lung disease ([facility])    • CKD (chronic kidney disease) stage 4, GFR 15-29 ml/min ([facility])    • Colonic polyp    • Coronary arteriosclerosis    • Diabetes mellitus ([facility])    • Diabetic neuropathy ([facility])    • Ear pain, right 10/18/2021    - canal trauma due to patient scratching and DMT2 - added cortisporin ear drops   • Elevated troponin 10/12/2021    -most likely from CKD -Trending down -Neg chest pain   • GERD (gastroesophageal reflux disease)    • GI bleed 5/13/2021    - GI will follow up outpatient - Protonix 40mg daily - Avoid medical DVT prophy and use mechanical at this time instead. - Continue to monitor - pill colonoscopy results showed AVMs   • History of transfusion    • Hypercholesterolemia    • Hypertension    • Hypomagnesemia 6/27/2021    Monitor and replace   • Morbid obesity ([facility])    • Nephrolithiasis    • Peripheral vascular disease ([facility])    • Sleep apnea    • Substance abuse ([facility])    • Vitamin D deficiency        Allergies   Allergen Reactions   • Adhesive Tape Hives   • Other      Bandaids, MRSA, SURECLOSE       Past Surgical History:   Procedure Laterality Date   • CARDIAC CATHETERIZATION N/A 7/14/2020   • CARDIAC  CATHETERIZATION N/A 4/23/2021    Procedure: Left Heart Cath;  Surgeon: Melba Romo MD;  Location: [facility] CATH INVASIVE LOCATION;  Service: Cardiology;  Laterality: N/A;   • CARDIAC CATHETERIZATION N/A 4/30/2021    Procedure: Percutaneous Coronary Intervention;  Surgeon: Russell Voss MD;  Location: [facility] CATH INVASIVE LOCATION;  Service: Cardiovascular;  Laterality: N/A;   • CARDIAC CATHETERIZATION N/A 4/30/2021    Procedure: Stent NIKKI coronary;  Surgeon: Russell Voss MD;  Location: [facility] CATH INVASIVE LOCATION;  Service: Cardiovascular;  Laterality: N/A;   • CARDIAC CATHETERIZATION Left 11/13/2021    Procedure: Left Heart Cath;  Surgeon: Niall Rios MD;  Location: [facility] CATH INVASIVE LOCATION;  Service: Cardiology;  Laterality: Left;   • CAROTID STENT Left    • COLONOSCOPY     • COLONOSCOPY N/A 5/14/2021    Procedure: COLONOSCOPY;  Surgeon: Mingo Duarte MD;  Location: [facility] ENDOSCOPY;  Service: Gastroenterology;  Laterality: N/A;   • CORONARY ARTERY BYPASS GRAFT N/A 2013    CABG X 3   • CYSTOSCOPY BLADDER STONE LITHOTRIPSY Bilateral    • ENDOSCOPY N/A 4/12/2021    Procedure: ESOPHAGOGASTRODUODENOSCOPY;  Surgeon: Mingo Duarte MD;  Location: [facility] ENDOSCOPY;  Service: Gastroenterology;  Laterality: N/A;   • ENDOSCOPY N/A 5/14/2021    Procedure: ESOPHAGOGASTRODUODENOSCOPY;  Surgeon: Mingo Duarte MD;  Location: [facility] ENDOSCOPY;  Service: Gastroenterology;  Laterality: N/A;   • INTERVENTIONAL RADIOLOGY PROCEDURE N/A 10/21/2021    Procedure: tunneled central venous catheter placement;  Surgeon: Donnie Robles MD;  Location: [facility]IO INVASIVE LOCATION;  Service: Interventional Radiology;  Laterality: N/A;       Family History   Problem Relation Age of Onset   • Heart disease Mother    • Lung cancer Mother    • Heart disease Father    • Heart attack Father    • Diabetes Father    • Heart disease Half-Sister         Dad's side   • Heart disease Brother   "  • No Known Problems Sister    • No Known Problems Sister    • No Known Problems Sister    • No Known Problems Sister    • No Known Problems Sister    • Pancreatic cancer Half-Sister         Dad's side   • No Known Problems Brother    • No Known Problems Brother    • Heart attack Half-Brother    • Heart attack Half-Brother    • No Known Problems Maternal Grandmother    • No Known Problems Maternal Grandfather    • No Known Problems Paternal Grandmother    • No Known Problems Paternal Grandfather        Social History     Socioeconomic History   • Marital status:      Spouse name: wesley dumont   Tobacco Use   • Smoking status: Former Smoker     Packs/day: 0.25     Years: 46.00     Pack years: 11.50     Types: Cigarettes   • Smokeless tobacco: Never Used   • Tobacco comment: only smoking 5 a day - quit april 23 2021   Substance and Sexual Activity   • Alcohol use: No   • Drug use: Not Currently     Types: LSD, Marijuana, Methamphetamines   • Sexual activity: Not Currently           Objective    Vitals:    01/09/22 1101 01/09/22 1201 01/09/22 1246 01/09/22 1304   BP: (!) 184/84 (!) 193/87 (!) 211/95    BP Location:       Patient Position:       Pulse: 102 106 108    Resp: 16 16 16    Temp:       TempSrc:       SpO2: 98% 97% 98%    Weight:       Height:    167.6 cm (66\")       Physical Exam  Vitals and nursing note reviewed.   Constitutional:       Appearance: She is well-developed. She is obese. She is ill-appearing. She is not diaphoretic.   HENT:      Head: Normocephalic.      Right Ear: External ear normal.      Left Ear: External ear normal.   Eyes:      Pupils: Pupils are equal, round, and reactive to light.   Pulmonary:      Effort: Pulmonary effort is normal. No accessory muscle usage or respiratory distress.      Breath sounds: No wheezing.   Chest:      Chest wall: No tenderness.   Abdominal:      General: Bowel sounds are normal.      Palpations: Abdomen is soft.      Tenderness: There is no abdominal " tenderness (deep palpation).   Musculoskeletal:         General: Normal range of motion.   Skin:     General: Skin is warm and dry.      Capillary Refill: Capillary refill takes less than 2 seconds.   Neurological:      Mental Status: She is alert. She is disoriented and confused.      GCS: GCS eye subscore is 2. GCS verbal subscore is 2. GCS motor subscore is 5.      Cranial Nerves: No facial asymmetry.   Psychiatric:         Behavior: Behavior normal.         ECG 12 Lead      Date/Time: 1/9/2022 1:10 PM  Performed by: Bladimir Morillo MD  Authorized by: Bladimir Morillo MD   Interpreted by physician  Rhythm: sinus tachycardia  Rate: tachycardic  QRS axis: normal  Conduction: right bundle branch block  ST segment elevation noted on lead: none.  Clinical impression: abnormal ECG                 ED Course      Results for orders placed or performed during the hospital encounter of 01/09/22   Comprehensive Metabolic Panel    Specimen: Blood   Result Value Ref Range    Glucose 800 (C) 65 - 99 mg/dL    BUN 44 (H) 8 - 23 mg/dL    Creatinine 3.46 (H) 0.57 - 1.00 mg/dL    Sodium 129 (L) 136 - 145 mmol/L    Potassium 4.0 3.5 - 5.2 mmol/L    Chloride 87 (L) 98 - 107 mmol/L    CO2 18.0 (L) 22.0 - 29.0 mmol/L    Calcium 9.0 8.6 - 10.5 mg/dL    Total Protein 8.0 6.0 - 8.5 g/dL    Albumin 3.80 3.50 - 5.20 g/dL    ALT (SGPT) 7 1 - 33 U/L    AST (SGOT) 10 1 - 32 U/L    Alkaline Phosphatase 192 (H) 39 - 117 U/L    Total Bilirubin 0.2 0.0 - 1.2 mg/dL    eGFR Non African Amer 13 (L) >60 mL/min/1.73    eGFR  African Amer      Globulin 4.2 gm/dL    A/G Ratio 0.9 g/dL    BUN/Creatinine Ratio 12.7 7.0 - 25.0    Anion Gap 24.0 (H) 5.0 - 15.0 mmol/L   aPTT    Specimen: Blood   Result Value Ref Range    PTT 23.2 20.0 - 40.3 seconds   Protime-INR    Specimen: Blood   Result Value Ref Range    Protime 14.1 11.1 - 15.3 Seconds    INR 1.10 0.80 - 1.20   Lipase    Specimen: Blood   Result Value Ref Range    Lipase 54 13 - 60 U/L   BNP     Specimen: Blood   Result Value Ref Range    proBNP 18,229.0 (H) 0.0 - 900.0 pg/mL   Troponin    Specimen: Blood   Result Value Ref Range    Troponin T 0.268 (C) 0.000 - 0.030 ng/mL   Ethanol    Specimen: Blood   Result Value Ref Range    Ethanol <10 0 - 10 mg/dL    Ethanol % <0.010 %   Magnesium    Specimen: Blood   Result Value Ref Range    Magnesium 1.9 1.6 - 2.4 mg/dL   CBC Auto Differential    Specimen: Blood   Result Value Ref Range    WBC 17.78 (H) 3.40 - 10.80 10*3/mm3    RBC 2.44 (L) 3.77 - 5.28 10*6/mm3    Hemoglobin 7.0 (L) 12.0 - 15.9 g/dL    Hematocrit 22.1 (L) 34.0 - 46.6 %    MCV 90.6 79.0 - 97.0 fL    MCH 28.7 26.6 - 33.0 pg    MCHC 31.7 31.5 - 35.7 g/dL    RDW 16.0 (H) 12.3 - 15.4 %    RDW-SD 53.4 37.0 - 54.0 fl    MPV 9.1 6.0 - 12.0 fL    Platelets 423 140 - 450 10*3/mm3    Neutrophil % 86.4 (H) 42.7 - 76.0 %    Lymphocyte % 8.1 (L) 19.6 - 45.3 %    Monocyte % 3.5 (L) 5.0 - 12.0 %    Eosinophil % 0.0 (L) 0.3 - 6.2 %    Basophil % 0.4 0.0 - 1.5 %    Immature Grans % 1.6 (H) 0.0 - 0.5 %    Neutrophils, Absolute 15.36 (H) 1.70 - 7.00 10*3/mm3    Lymphocytes, Absolute 1.44 0.70 - 3.10 10*3/mm3    Monocytes, Absolute 0.62 0.10 - 0.90 10*3/mm3    Eosinophils, Absolute 0.00 0.00 - 0.40 10*3/mm3    Basophils, Absolute 0.08 0.00 - 0.20 10*3/mm3    Immature Grans, Absolute 0.28 (H) 0.00 - 0.05 10*3/mm3    nRBC 0.0 0.0 - 0.2 /100 WBC   Blood Gas, Arterial -    Specimen: Arterial Blood   Result Value Ref Range    Site Left Brachial     Shadi's Test N/A     pH, Arterial 7.316 (L) 7.350 - 7.450 pH units    pCO2, Arterial 34.9 (L) 35.0 - 45.0 mm Hg    pO2, Arterial 98.5 83.0 - 108.0 mm Hg    HCO3, Arterial 17.8 (L) 20.0 - 26.0 mmol/L    Base Excess, Arterial -7.5 (L) 0.0 - 2.0 mmol/L    O2 Saturation, Arterial 97.9 94.0 - 99.0 %    Barometric Pressure for Blood Gas 754 mmHg    Modality Nasal Cannula     Flow Rate 3.0 lpm    Ventilator Mode NA     Collected by KS    Type & Screen    Specimen: Blood   Result Value  Ref Range    ABO Type O     RH type Positive     Antibody Screen Negative     T&S Expiration Date 1/12/2022 11:59:59 PM    PREVIOUS HISTORY    Specimen: Blood   Result Value Ref Range    Previous History Previous Record on File    Gold Top - SST   Result Value Ref Range    Extra Tube Hold for add-ons.      *Note: Due to a large number of results and/or encounters for the requested time period, some results have not been displayed. A complete set of results can be found in Results Review.     US Guidance PICC NC   Final Result      XR Chest Post CVA Port   Final Result   CONCLUSION:   Right PICC line now in place with tip projected over the proximal   superior vena cava.   Right internal jugular multilumen catheter remains in place with   tip projected over the proximal superior vena cava.   Sternotomy.   Cardiomegaly with minimal pulmonary vascular congestion and   interstitial edema.   Perhaps very small right pleural effusion.      [id number]      Electronically signed by:  Jon Patricia MD  1/9/2022 12:35 PM CST   Workstation: [id number]      IR PICC W Imaging Guidance   Final Result      XR Chest 1 View   Final Result   Unchanged cardiomegaly with probable mild pulmonary edema.      Electronically signed by:  Kamar Khanna MD  1/9/2022   11:12 AM CST Workstation: [id number]      CT Head Without Contrast   Final Result   No acute intracranial process.      Unchanged sequela of chronic ischemic microvascular disease and   cerebral volume loss.      Electronically signed by:  Kamar Khanna MD  1/9/2022   10:52 AM CST Workstation: [id number]              MDM  Number of Diagnoses or Management Options  Altered mental status, unspecified altered mental status type: new and requires workup  Diabetic ketoacidosis with coma associated with other specified diabetes mellitus (HCC): new and requires workup  Hypertension, unspecified type: new and requires workup  Diagnosis management comments: Vital signs  are stable, afebrile.  Labs obtained significant for glucose of 800, bicarb of 18, anion gap of 24, pH of 7.32 with a normal PCO2 and PO2.  White count is 18 with a hemoglobin of 7.  BNP is 18,000, troponin 0.27.  CT head negative for acute intracranial abnormalities.  Chest x-ray shows cardiomegaly with mild pulmonary edema.  Patient is due for dialysis tomorrow.  Patient received antibiotics, a liter of fluid by EMS, IV insulin and started on an insulin drip for what looks like DKA.  Spoke with the on-call resident who agrees to admit.       Amount and/or Complexity of Data Reviewed  Decide to obtain previous medical records or to obtain history from someone other than the patient: yes        Final diagnoses:   Diabetic ketoacidosis with coma associated with other specified diabetes mellitus (HCC)   Hypertension, unspecified type   Altered mental status, unspecified altered mental status type       ED Disposition  ED Disposition     ED Disposition Condition Comment    Decision to Admit  Level of Care: Stepdown [25]   Diagnosis: Diabetic ketoacidosis with coma associated with other specified diabetes mellitus (HCC) [7478580]   Admitting Physician: TARIQ CONDON [[id number]]   Attending Physician: TARIQ CONDON [[id number]]            No follow-up provider specified.       Medication List      No changes were made to your prescriptions during this visit.          Bladimir Morillo MD  01/09/22 3670

## 2022-01-09 NOTE — ED NOTES
Dalila from [facility] is on her way to insert PICC line into patient's right arm.      Milly Dial RN  01/09/22 8088

## 2022-01-09 NOTE — ED NOTES
This RN called lab and asked for someone to come draw the patient's bloodwork. Two previous attempts were made to get it by this RN and was not successful.      Milly Dial, RN  01/09/22 1111

## 2022-01-10 ENCOUNTER — HOME CARE VISIT (OUTPATIENT)
Dept: HOME HEALTH SERVICES | Facility: HOME HEALTHCARE | Age: 62
End: 2022-01-10

## 2022-01-10 ENCOUNTER — APPOINTMENT (OUTPATIENT)
Dept: CARDIOLOGY | Facility: HOSPITAL | Age: 62
End: 2022-01-10

## 2022-01-10 PROBLEM — E11.10 DIABETIC ACIDOSIS: Status: RESOLVED | Noted: 2022-01-10 | Resolved: 2022-01-10

## 2022-01-10 PROBLEM — E11.10 DIABETIC ACIDOSIS: Status: ACTIVE | Noted: 2022-01-10

## 2022-01-10 LAB
AMMONIA BLD-SCNC: 34 UMOL/L (ref 11–51)
ANION GAP SERPL CALCULATED.3IONS-SCNC: 12 MMOL/L (ref 5–15)
ANION GAP SERPL CALCULATED.3IONS-SCNC: 14 MMOL/L (ref 5–15)
ANION GAP SERPL CALCULATED.3IONS-SCNC: 15 MMOL/L (ref 5–15)
ANION GAP SERPL CALCULATED.3IONS-SCNC: 17 MMOL/L (ref 5–15)
ARTERIAL PATENCY WRIST A: ABNORMAL
ATMOSPHERIC PRESS: 760 MMHG
BASE EXCESS BLDA CALC-SCNC: -1.2 MMOL/L (ref 0–2)
BDY SITE: ABNORMAL
BH CV ECHO MEAS - ACS: 1.7 CM
BH CV ECHO MEAS - AO MAX PG (FULL): 2.1 MMHG
BH CV ECHO MEAS - AO MAX PG: 8.9 MMHG
BH CV ECHO MEAS - AO MEAN PG (FULL): 2 MMHG
BH CV ECHO MEAS - AO MEAN PG: 5 MMHG
BH CV ECHO MEAS - AO ROOT AREA (BSA CORRECTED): 1.2
BH CV ECHO MEAS - AO ROOT AREA: 6.2 CM^2
BH CV ECHO MEAS - AO ROOT DIAM: 2.8 CM
BH CV ECHO MEAS - AO V2 MAX: 149 CM/SEC
BH CV ECHO MEAS - AO V2 MEAN: 103 CM/SEC
BH CV ECHO MEAS - AO V2 VTI: 24.8 CM
BH CV ECHO MEAS - AVA(I,A): 2.5 CM^2
BH CV ECHO MEAS - AVA(I,D): 2.5 CM^2
BH CV ECHO MEAS - AVA(V,A): 2.7 CM^2
BH CV ECHO MEAS - AVA(V,D): 2.7 CM^2
BH CV ECHO MEAS - BSA(HAYCOCK): 2.5 M^2
BH CV ECHO MEAS - BSA: 2.3 M^2
BH CV ECHO MEAS - BZI_BMI: 45 KILOGRAMS/M^2
BH CV ECHO MEAS - BZI_METRIC_HEIGHT: 167.6 CM
BH CV ECHO MEAS - BZI_METRIC_WEIGHT: 126.6 KG
BH CV ECHO MEAS - EDV(CUBED): 85.2 ML
BH CV ECHO MEAS - EDV(MOD-SP2): 41.2 ML
BH CV ECHO MEAS - EDV(MOD-SP4): 43 ML
BH CV ECHO MEAS - EDV(TEICH): 87.7 ML
BH CV ECHO MEAS - EF(CUBED): 85.7 %
BH CV ECHO MEAS - EF(MOD-SP2): 73.3 %
BH CV ECHO MEAS - EF(MOD-SP4): 66.7 %
BH CV ECHO MEAS - EF(TEICH): 79.3 %
BH CV ECHO MEAS - ESV(CUBED): 12.2 ML
BH CV ECHO MEAS - ESV(MOD-SP2): 11 ML
BH CV ECHO MEAS - ESV(MOD-SP4): 14.3 ML
BH CV ECHO MEAS - ESV(TEICH): 18.1 ML
BH CV ECHO MEAS - FS: 47.7 %
BH CV ECHO MEAS - IVS/LVPW: 0.97
BH CV ECHO MEAS - IVSD: 1.2 CM
BH CV ECHO MEAS - LA DIMENSION: 3.8 CM
BH CV ECHO MEAS - LA/AO: 1.4
BH CV ECHO MEAS - LV DIASTOLIC VOL/BSA (35-75): 18.7 ML/M^2
BH CV ECHO MEAS - LV MASS(C)D: 198.3 GRAMS
BH CV ECHO MEAS - LV MASS(C)DI: 86.1 GRAMS/M^2
BH CV ECHO MEAS - LV MAX PG: 6.8 MMHG
BH CV ECHO MEAS - LV MEAN PG: 3 MMHG
BH CV ECHO MEAS - LV SYSTOLIC VOL/BSA (12-30): 6.2 ML/M^2
BH CV ECHO MEAS - LV V1 MAX: 130 CM/SEC
BH CV ECHO MEAS - LV V1 MEAN: 78.3 CM/SEC
BH CV ECHO MEAS - LV V1 VTI: 19.5 CM
BH CV ECHO MEAS - LVIDD: 4.4 CM
BH CV ECHO MEAS - LVIDS: 2.3 CM
BH CV ECHO MEAS - LVLD AP2: 7.1 CM
BH CV ECHO MEAS - LVLD AP4: 6.8 CM
BH CV ECHO MEAS - LVLS AP2: 5.4 CM
BH CV ECHO MEAS - LVLS AP4: 6 CM
BH CV ECHO MEAS - LVOT AREA (M): 3.1 CM^2
BH CV ECHO MEAS - LVOT AREA: 3.1 CM^2
BH CV ECHO MEAS - LVOT DIAM: 2 CM
BH CV ECHO MEAS - LVPWD: 1.3 CM
BH CV ECHO MEAS - MV A MAX VEL: 125 CM/SEC
BH CV ECHO MEAS - MV DEC SLOPE: 678 CM/SEC^2
BH CV ECHO MEAS - MV E MAX VEL: 109 CM/SEC
BH CV ECHO MEAS - MV E/A: 0.87
BH CV ECHO MEAS - MV P1/2T MAX VEL: 121 CM/SEC
BH CV ECHO MEAS - MV P1/2T: 52.3 MSEC
BH CV ECHO MEAS - MVA P1/2T LCG: 1.8 CM^2
BH CV ECHO MEAS - MVA(P1/2T): 4.2 CM^2
BH CV ECHO MEAS - PA MAX PG (FULL): 2 MMHG
BH CV ECHO MEAS - PA MAX PG: 7.8 MMHG
BH CV ECHO MEAS - PA V2 MAX: 140 CM/SEC
BH CV ECHO MEAS - RV MAX PG: 5.9 MMHG
BH CV ECHO MEAS - RV MEAN PG: 3 MMHG
BH CV ECHO MEAS - RV V1 MAX: 121 CM/SEC
BH CV ECHO MEAS - RV V1 MEAN: 76.8 CM/SEC
BH CV ECHO MEAS - RV V1 VTI: 17.8 CM
BH CV ECHO MEAS - SI(AO): 66.3 ML/M^2
BH CV ECHO MEAS - SI(CUBED): 31.7 ML/M^2
BH CV ECHO MEAS - SI(LVOT): 26.6 ML/M^2
BH CV ECHO MEAS - SI(MOD-SP2): 13.1 ML/M^2
BH CV ECHO MEAS - SI(MOD-SP4): 12.5 ML/M^2
BH CV ECHO MEAS - SI(TEICH): 30.2 ML/M^2
BH CV ECHO MEAS - SV(AO): 152.7 ML
BH CV ECHO MEAS - SV(CUBED): 73 ML
BH CV ECHO MEAS - SV(LVOT): 61.3 ML
BH CV ECHO MEAS - SV(MOD-SP2): 30.2 ML
BH CV ECHO MEAS - SV(MOD-SP4): 28.7 ML
BH CV ECHO MEAS - SV(TEICH): 69.6 ML
BUN SERPL-MCNC: 32 MG/DL (ref 8–23)
BUN SERPL-MCNC: 49 MG/DL (ref 8–23)
BUN SERPL-MCNC: 51 MG/DL (ref 8–23)
BUN SERPL-MCNC: 52 MG/DL (ref 8–23)
BUN/CREAT SERPL: 11 (ref 7–25)
BUN/CREAT SERPL: 12.5 (ref 7–25)
BUN/CREAT SERPL: 13 (ref 7–25)
BUN/CREAT SERPL: 13.1 (ref 7–25)
CALCIUM SPEC-SCNC: 8.1 MG/DL (ref 8.6–10.5)
CALCIUM SPEC-SCNC: 8.8 MG/DL (ref 8.6–10.5)
CALCIUM SPEC-SCNC: 8.9 MG/DL (ref 8.6–10.5)
CALCIUM SPEC-SCNC: 8.9 MG/DL (ref 8.6–10.5)
CHLORIDE SERPL-SCNC: 101 MMOL/L (ref 98–107)
CHLORIDE SERPL-SCNC: 103 MMOL/L (ref 98–107)
CHLORIDE SERPL-SCNC: 105 MMOL/L (ref 98–107)
CHLORIDE SERPL-SCNC: 97 MMOL/L (ref 98–107)
CO2 SERPL-SCNC: 20 MMOL/L (ref 22–29)
CO2 SERPL-SCNC: 21 MMOL/L (ref 22–29)
CO2 SERPL-SCNC: 22 MMOL/L (ref 22–29)
CO2 SERPL-SCNC: 23 MMOL/L (ref 22–29)
CREAT SERPL-MCNC: 2.91 MG/DL (ref 0.57–1)
CREAT SERPL-MCNC: 3.9 MG/DL (ref 0.57–1)
CREAT SERPL-MCNC: 3.93 MG/DL (ref 0.57–1)
CREAT SERPL-MCNC: 4.01 MG/DL (ref 0.57–1)
DEPRECATED RDW RBC AUTO: 50.7 FL (ref 37–54)
ERYTHROCYTE [DISTWIDTH] IN BLOOD BY AUTOMATED COUNT: 16 % (ref 12.3–15.4)
GAS FLOW AIRWAY: 2 LPM
GFR SERPL CREATININE-BSD FRML MDRD: 11 ML/MIN/1.73
GFR SERPL CREATININE-BSD FRML MDRD: 12 ML/MIN/1.73
GFR SERPL CREATININE-BSD FRML MDRD: 12 ML/MIN/1.73
GFR SERPL CREATININE-BSD FRML MDRD: 16 ML/MIN/1.73
GFR SERPL CREATININE-BSD FRML MDRD: ABNORMAL ML/MIN/{1.73_M2}
GLUCOSE BLDC GLUCOMTR-MCNC: 127 MG/DL (ref 70–130)
GLUCOSE BLDC GLUCOMTR-MCNC: 160 MG/DL (ref 70–130)
GLUCOSE BLDC GLUCOMTR-MCNC: 193 MG/DL (ref 70–130)
GLUCOSE BLDC GLUCOMTR-MCNC: 204 MG/DL (ref 70–130)
GLUCOSE BLDC GLUCOMTR-MCNC: 204 MG/DL (ref 70–130)
GLUCOSE BLDC GLUCOMTR-MCNC: 214 MG/DL (ref 70–130)
GLUCOSE BLDC GLUCOMTR-MCNC: 240 MG/DL (ref 70–130)
GLUCOSE BLDC GLUCOMTR-MCNC: 245 MG/DL (ref 70–130)
GLUCOSE BLDC GLUCOMTR-MCNC: 266 MG/DL (ref 70–130)
GLUCOSE BLDC GLUCOMTR-MCNC: 282 MG/DL (ref 70–130)
GLUCOSE BLDC GLUCOMTR-MCNC: 376 MG/DL (ref 70–130)
GLUCOSE BLDC GLUCOMTR-MCNC: 441 MG/DL (ref 70–130)
GLUCOSE BLDC GLUCOMTR-MCNC: 505 MG/DL (ref 70–130)
GLUCOSE BLDC GLUCOMTR-MCNC: 571 MG/DL (ref 70–130)
GLUCOSE SERPL-MCNC: 136 MG/DL (ref 65–99)
GLUCOSE SERPL-MCNC: 203 MG/DL (ref 65–99)
GLUCOSE SERPL-MCNC: 255 MG/DL (ref 65–99)
GLUCOSE SERPL-MCNC: 311 MG/DL (ref 65–99)
HBV SURFACE AG SERPL QL IA: NORMAL
HCO3 BLDA-SCNC: 22.7 MMOL/L (ref 20–26)
HCT VFR BLD AUTO: 25.6 % (ref 34–46.6)
HGB BLD-MCNC: 8.5 G/DL (ref 12–15.9)
HOLD SPECIMEN: NORMAL
LV EF 2D ECHO EST: 65 %
Lab: ABNORMAL
MAXIMAL PREDICTED HEART RATE: 158 BPM
MCH RBC QN AUTO: 28.8 PG (ref 26.6–33)
MCHC RBC AUTO-ENTMCNC: 33.2 G/DL (ref 31.5–35.7)
MCV RBC AUTO: 86.8 FL (ref 79–97)
MODALITY: ABNORMAL
PCO2 BLDA: 33.4 MM HG (ref 35–45)
PH BLDA: 7.44 PH UNITS (ref 7.35–7.45)
PHOSPHATE SERPL-MCNC: 2.5 MG/DL (ref 2.5–4.5)
PHOSPHATE SERPL-MCNC: 2.6 MG/DL (ref 2.5–4.5)
PHOSPHATE SERPL-MCNC: 2.7 MG/DL (ref 2.5–4.5)
PHOSPHATE SERPL-MCNC: 2.8 MG/DL (ref 2.5–4.5)
PHOSPHATE SERPL-MCNC: 3 MG/DL (ref 2.5–4.5)
PHOSPHATE SERPL-MCNC: 3.2 MG/DL (ref 2.5–4.5)
PLATELET # BLD AUTO: 259 10*3/MM3 (ref 140–450)
PMV BLD AUTO: 8.9 FL (ref 6–12)
PO2 BLDA: 78.3 MM HG (ref 83–108)
POTASSIUM SERPL-SCNC: 2.8 MMOL/L (ref 3.5–5.2)
POTASSIUM SERPL-SCNC: 2.9 MMOL/L (ref 3.5–5.2)
POTASSIUM SERPL-SCNC: 3.1 MMOL/L (ref 3.5–5.2)
POTASSIUM SERPL-SCNC: 3.2 MMOL/L (ref 3.5–5.2)
RBC # BLD AUTO: 2.95 10*6/MM3 (ref 3.77–5.28)
SAO2 % BLDCOA: 97.8 % (ref 94–99)
SODIUM SERPL-SCNC: 132 MMOL/L (ref 136–145)
SODIUM SERPL-SCNC: 138 MMOL/L (ref 136–145)
SODIUM SERPL-SCNC: 139 MMOL/L (ref 136–145)
SODIUM SERPL-SCNC: 141 MMOL/L (ref 136–145)
STRESS TARGET HR: 134 BPM
TROPONIN T SERPL-MCNC: 0.63 NG/ML (ref 0–0.03)
TROPONIN T SERPL-MCNC: 0.63 NG/ML (ref 0–0.03)
TROPONIN T SERPL-MCNC: 0.67 NG/ML (ref 0–0.03)
VANCOMYCIN SERPL-MCNC: 16.1 MCG/ML (ref 5–40)
VENTILATOR MODE: ABNORMAL
WBC NRBC COR # BLD: 11.34 10*3/MM3 (ref 3.4–10.8)

## 2022-01-10 PROCEDURE — 80048 BASIC METABOLIC PNL TOTAL CA: CPT | Performed by: STUDENT IN AN ORGANIZED HEALTH CARE EDUCATION/TRAINING PROGRAM

## 2022-01-10 PROCEDURE — 94799 UNLISTED PULMONARY SVC/PX: CPT

## 2022-01-10 PROCEDURE — 94664 DEMO&/EVAL PT USE INHALER: CPT

## 2022-01-10 PROCEDURE — 80202 ASSAY OF VANCOMYCIN: CPT | Performed by: CHIROPRACTOR

## 2022-01-10 PROCEDURE — 84484 ASSAY OF TROPONIN QUANT: CPT | Performed by: STUDENT IN AN ORGANIZED HEALTH CARE EDUCATION/TRAINING PROGRAM

## 2022-01-10 PROCEDURE — 25010000002 VANCOMYCIN 10 G RECONSTITUTED SOLUTION: Performed by: CHIROPRACTOR

## 2022-01-10 PROCEDURE — 99232 SBSQ HOSP IP/OBS MODERATE 35: CPT | Performed by: STUDENT IN AN ORGANIZED HEALTH CARE EDUCATION/TRAINING PROGRAM

## 2022-01-10 PROCEDURE — 99222 1ST HOSP IP/OBS MODERATE 55: CPT | Performed by: INTERNAL MEDICINE

## 2022-01-10 PROCEDURE — 94660 CPAP INITIATION&MGMT: CPT

## 2022-01-10 PROCEDURE — 25010000002 PIPERACILLIN SOD-TAZOBACTAM PER 1 G: Performed by: CHIROPRACTOR

## 2022-01-10 PROCEDURE — 82962 GLUCOSE BLOOD TEST: CPT

## 2022-01-10 PROCEDURE — 94761 N-INVAS EAR/PLS OXIMETRY MLT: CPT

## 2022-01-10 PROCEDURE — 84100 ASSAY OF PHOSPHORUS: CPT | Performed by: STUDENT IN AN ORGANIZED HEALTH CARE EDUCATION/TRAINING PROGRAM

## 2022-01-10 PROCEDURE — 25010000002 PERFLUTREN (DEFINITY) 8.476 MG IN SODIUM CHLORIDE (PF) 0.9 % 10 ML INJECTION: Performed by: CHIROPRACTOR

## 2022-01-10 PROCEDURE — 93306 TTE W/DOPPLER COMPLETE: CPT | Performed by: INTERNAL MEDICINE

## 2022-01-10 PROCEDURE — 82803 BLOOD GASES ANY COMBINATION: CPT

## 2022-01-10 PROCEDURE — 36600 WITHDRAWAL OF ARTERIAL BLOOD: CPT

## 2022-01-10 PROCEDURE — 93306 TTE W/DOPPLER COMPLETE: CPT

## 2022-01-10 PROCEDURE — 63710000001 INSULIN ASPART PER 5 UNITS: Performed by: STUDENT IN AN ORGANIZED HEALTH CARE EDUCATION/TRAINING PROGRAM

## 2022-01-10 PROCEDURE — 25010000002 HEPARIN (PORCINE) PER 1000 UNITS: Performed by: STUDENT IN AN ORGANIZED HEALTH CARE EDUCATION/TRAINING PROGRAM

## 2022-01-10 PROCEDURE — 63710000001 INSULIN DETEMIR PER 5 UNITS: Performed by: STUDENT IN AN ORGANIZED HEALTH CARE EDUCATION/TRAINING PROGRAM

## 2022-01-10 PROCEDURE — 36415 COLL VENOUS BLD VENIPUNCTURE: CPT | Performed by: STUDENT IN AN ORGANIZED HEALTH CARE EDUCATION/TRAINING PROGRAM

## 2022-01-10 PROCEDURE — 25010000002 HEPARIN (PORCINE) PER 1000 UNITS: Performed by: INTERNAL MEDICINE

## 2022-01-10 PROCEDURE — 5A1D70Z PERFORMANCE OF URINARY FILTRATION, INTERMITTENT, LESS THAN 6 HOURS PER DAY: ICD-10-PCS | Performed by: INTERNAL MEDICINE

## 2022-01-10 PROCEDURE — 82140 ASSAY OF AMMONIA: CPT | Performed by: STUDENT IN AN ORGANIZED HEALTH CARE EDUCATION/TRAINING PROGRAM

## 2022-01-10 PROCEDURE — 85027 COMPLETE CBC AUTOMATED: CPT | Performed by: NURSE PRACTITIONER

## 2022-01-10 RX ORDER — HEPARIN SODIUM 1000 [USP'U]/ML
2000 INJECTION, SOLUTION INTRAVENOUS; SUBCUTANEOUS AS NEEDED
Status: DISCONTINUED | OUTPATIENT
Start: 2022-01-10 | End: 2022-01-14

## 2022-01-10 RX ORDER — ALBUMIN (HUMAN) 12.5 G/50ML
12.5 SOLUTION INTRAVENOUS AS NEEDED
Status: ACTIVE | OUTPATIENT
Start: 2022-01-10 | End: 2022-01-10

## 2022-01-10 RX ORDER — NICOTINE POLACRILEX 4 MG
15 LOZENGE BUCCAL
Status: DISCONTINUED | OUTPATIENT
Start: 2022-01-10 | End: 2022-01-20 | Stop reason: HOSPADM

## 2022-01-10 RX ORDER — BUMETANIDE 1 MG/1
1 TABLET ORAL
Status: DISCONTINUED | OUTPATIENT
Start: 2022-01-10 | End: 2022-01-11

## 2022-01-10 RX ORDER — POTASSIUM CHLORIDE 750 MG/1
10 TABLET, FILM COATED, EXTENDED RELEASE ORAL DAILY
COMMUNITY
End: 2022-01-20 | Stop reason: HOSPADM

## 2022-01-10 RX ORDER — BUMETANIDE 1 MG/1
1 TABLET ORAL 2 TIMES DAILY
COMMUNITY
End: 2022-01-20 | Stop reason: HOSPADM

## 2022-01-10 RX ORDER — DEXTROSE MONOHYDRATE 25 G/50ML
25 INJECTION, SOLUTION INTRAVENOUS
Status: DISCONTINUED | OUTPATIENT
Start: 2022-01-10 | End: 2022-01-20 | Stop reason: HOSPADM

## 2022-01-10 RX ADMIN — IPRATROPIUM BROMIDE AND ALBUTEROL SULFATE 3 ML: 2.5; .5 SOLUTION RESPIRATORY (INHALATION) at 20:05

## 2022-01-10 RX ADMIN — PERFLUTREN 1.5 ML: 6.52 INJECTION, SUSPENSION INTRAVENOUS at 14:27

## 2022-01-10 RX ADMIN — PIPERACILLIN SODIUM AND TAZOBACTAM SODIUM 3.38 G: 3; .375 INJECTION, POWDER, LYOPHILIZED, FOR SOLUTION INTRAVENOUS at 14:13

## 2022-01-10 RX ADMIN — HEPARIN SODIUM 5000 UNITS: 5000 INJECTION INTRAVENOUS; SUBCUTANEOUS at 21:44

## 2022-01-10 RX ADMIN — INSULIN DETEMIR 30 UNITS: 100 INJECTION, SOLUTION SUBCUTANEOUS at 07:49

## 2022-01-10 RX ADMIN — POTASSIUM CHLORIDE, DEXTROSE MONOHYDRATE AND SODIUM CHLORIDE 50 ML/HR: 300; 5; 450 INJECTION, SOLUTION INTRAVENOUS at 01:26

## 2022-01-10 RX ADMIN — PIPERACILLIN SODIUM AND TAZOBACTAM SODIUM 3.38 G: 3; .375 INJECTION, POWDER, LYOPHILIZED, FOR SOLUTION INTRAVENOUS at 03:07

## 2022-01-10 RX ADMIN — INSULIN ASPART 6 UNITS: 100 INJECTION, SOLUTION INTRAVENOUS; SUBCUTANEOUS at 13:39

## 2022-01-10 RX ADMIN — SODIUM CHLORIDE, PRESERVATIVE FREE 10 ML: 5 INJECTION INTRAVENOUS at 20:48

## 2022-01-10 RX ADMIN — INSULIN ASPART 30 UNITS: 100 INJECTION, SOLUTION INTRAVENOUS; SUBCUTANEOUS at 17:41

## 2022-01-10 RX ADMIN — SODIUM CHLORIDE, PRESERVATIVE FREE 10 ML: 5 INJECTION INTRAVENOUS at 08:27

## 2022-01-10 RX ADMIN — HEPARIN SODIUM 2000 UNITS: 1000 INJECTION INTRAVENOUS; SUBCUTANEOUS at 08:05

## 2022-01-10 RX ADMIN — HEPARIN SODIUM 5000 UNITS: 5000 INJECTION INTRAVENOUS; SUBCUTANEOUS at 13:39

## 2022-01-10 RX ADMIN — METOPROLOL TARTRATE 50 MG: 50 TABLET, FILM COATED ORAL at 20:48

## 2022-01-10 RX ADMIN — SODIUM CHLORIDE, PRESERVATIVE FREE: 5 INJECTION INTRAVENOUS at 08:25

## 2022-01-10 RX ADMIN — HEPARIN SODIUM 2000 UNITS: 1000 INJECTION INTRAVENOUS; SUBCUTANEOUS at 11:36

## 2022-01-10 RX ADMIN — SODIUM CHLORIDE, PRESERVATIVE FREE 3 ML: 5 INJECTION INTRAVENOUS at 20:50

## 2022-01-10 RX ADMIN — BUMETANIDE 1 MG: 1 TABLET ORAL at 20:48

## 2022-01-10 RX ADMIN — PANTOPRAZOLE SODIUM 40 MG: 40 INJECTION, POWDER, FOR SOLUTION INTRAVENOUS at 05:25

## 2022-01-10 RX ADMIN — RANOLAZINE 500 MG: 500 TABLET, FILM COATED, EXTENDED RELEASE ORAL at 20:48

## 2022-01-10 RX ADMIN — IPRATROPIUM BROMIDE AND ALBUTEROL SULFATE 3 ML: 2.5; .5 SOLUTION RESPIRATORY (INHALATION) at 07:25

## 2022-01-10 RX ADMIN — NYSTATIN: 100000 POWDER TOPICAL at 08:25

## 2022-01-10 RX ADMIN — INSULIN DETEMIR 30 UNITS: 100 INJECTION, SOLUTION SUBCUTANEOUS at 20:48

## 2022-01-10 RX ADMIN — IPRATROPIUM BROMIDE AND ALBUTEROL SULFATE 3 ML: 2.5; .5 SOLUTION RESPIRATORY (INHALATION) at 14:38

## 2022-01-10 RX ADMIN — NYSTATIN 1 APPLICATION: 100000 POWDER TOPICAL at 20:48

## 2022-01-10 RX ADMIN — MONTELUKAST 10 MG: 10 TABLET, FILM COATED ORAL at 20:48

## 2022-01-10 RX ADMIN — HEPARIN SODIUM 5000 UNITS: 5000 INJECTION INTRAVENOUS; SUBCUTANEOUS at 05:26

## 2022-01-10 RX ADMIN — DOCUSATE SODIUM 50 MG AND SENNOSIDES 8.6 MG 2 TABLET: 8.6; 5 TABLET, FILM COATED ORAL at 20:48

## 2022-01-10 RX ADMIN — VANCOMYCIN HYDROCHLORIDE 500 MG: 100 INJECTION, POWDER, LYOPHILIZED, FOR SOLUTION INTRAVENOUS at 14:17

## 2022-01-10 RX ADMIN — IPRATROPIUM BROMIDE AND ALBUTEROL SULFATE 3 ML: 2.5; .5 SOLUTION RESPIRATORY (INHALATION) at 10:40

## 2022-01-10 RX ADMIN — INSULIN ASPART 4 UNITS: 100 INJECTION, SOLUTION INTRAVENOUS; SUBCUTANEOUS at 17:41

## 2022-01-10 RX ADMIN — GABAPENTIN 300 MG: 300 CAPSULE ORAL at 21:44

## 2022-01-10 RX ADMIN — HEPARIN SODIUM 2000 UNITS: 1000 INJECTION INTRAVENOUS; SUBCUTANEOUS at 11:35

## 2022-01-10 NOTE — PROGRESS NOTES
"Pharmacokinetics by Pharmacy - Vancomycin    Yamileth Slater is a 61 y.o. female receiving vancomycin IV day 2 for sepsis  Patient is also receiving Zosyn    Objective:  [Ht: 167.6 cm (66\"); Wt: 127 kg (279 lb 5.2 oz)]     WBC   Date Value Ref Range Status   01/10/2022 11.34 (H) 3.40 - 10.80 10*3/mm3 Final   01/09/2022 17.78 (H) 3.40 - 10.80 10*3/mm3 Final   12/27/2021 11.04 (H) 3.40 - 10.80 10*3/mm3 Final      C-Reactive Protein   Date Value Ref Range Status   08/09/2021 4.79 (H) 0.00 - 0.50 mg/dL Final   08/08/2021 6.47 (H) 0.00 - 0.50 mg/dL Final   08/07/2021 7.00 (H) 0.00 - 0.50 mg/dL Final     Lactate   Date Value Ref Range Status   01/09/2022 4.8 (C) 0.5 - 2.0 mmol/L Final   01/09/2022 2.5 (C) 0.5 - 2.0 mmol/L Final   10/11/2021 1.3 0.5 - 2.0 mmol/L Final      Temp Readings from Last 1 Encounters:   01/10/22 99.1 °F (37.3 °C) (Axillary)     Estimated Creatinine Clearance: 27.3 mL/min (A) (by C-G formula based on SCr of 2.91 mg/dL (H)).   Creatinine   Date Value Ref Range Status   01/10/2022 2.91 (H) 0.57 - 1.00 mg/dL Final   01/10/2022 3.90 (H) 0.57 - 1.00 mg/dL Final   01/10/2022 4.01 (H) 0.57 - 1.00 mg/dL Final   08/25/2021 2.84 (H) 0.57 - 1.11 mg/dL Final   08/24/2021 3.14 (H) 0.57 - 1.11 mg/dL Final   08/23/2021 3.80 (H) 0.57 - 1.11 mg/dL Final       Vancomycin Trough   Date Value Ref Range Status   08/07/2021 20.30 (H) 5.00 - 20.00 mcg/mL Final   08/04/2021 24.90 (H) 5.00 - 20.00 mcg/mL Final     Vancomycin Random   Date Value Ref Range Status   01/10/2022 16.10 5.00 - 40.00 mcg/mL Final   04/02/2021 15.80 5.00 - 40.00 mcg/mL Final       Culture Results:  Microbiology Results (last 10 days)       Procedure Component Value - Date/Time    COVID-19 and FLU A/B PCR - Swab, Nasopharynx [[id number]]  (Normal) Collected: 01/09/22 1413    Lab Status: Final result Specimen: Swab from Nasopharynx Updated: 01/09/22 1437     COVID19 Not Detected     Influenza A PCR Not Detected     Influenza B PCR Not Detected    " Narrative:      Fact sheet for providers: https://www.fda.gov/media/402638/download    Fact sheet for patients: https://www.fda.gov/media/800659/download    Test performed by PCR.    Blood Culture - Blood, Arm, Left [[id number]]  (Normal) Collected: 01/09/22 1214    Lab Status: Preliminary result Specimen: Blood from Arm, Left Updated: 01/10/22 1230     Blood Culture No growth at 24 hours          No results found for: RESPCX      Assessment:  WBC elevated but down from yesterday, afebrile today   Scr 2.91, hemodialysis MWF    Blood Cx: No growth at 24 hours    Vancomycin random level drawn at 1130 after hemodialysis today was 16.1. Will give patient a one time dose of 500 mg tonight and get a random level on Wednesday before next dialysis session.     Plan:  1. Give 500 mg IV vancomycin tonight at 1500.  2. Will order vancomycin random for 0600 every Monday, Wednesday, Friday before dialysis  3. Pharmacy will monitor renal function and adjust dose accordingly.      Trang Fritz RPH   01/10/22 13:40 CST

## 2022-01-10 NOTE — CONSULTS
"Adult Nutrition  Assessment    Patient Name:  Yamileth Slater  YOB: 1960  MRN: [id number]  Admit Date:  1/9/2022    Assessment Date:  1/10/2022    Comments: Pt currently on bipap.  Admitted with HHS and was on an insulin drip, now discontinued.  Pt is known to our services from previous admits.  She also has a hx of COPD and ESRD requiring hemodialysis.  Uncontrolled DM with a degress of noncompliance has been an issue.  RD will follow and make recommendations as appropriate.  Silvestre POC     Reason for Assessment     Row Name 01/10/22 1613          Reason for Assessment    Reason For Assessment per organizational policy; diagnosis/disease state     Diagnosis diabetes diagnosis/complications     Identified At Risk by Screening Criteria need for education                Nutrition/Diet History     Row Name 01/10/22 1613          Nutrition/Diet History    Typical Food/Fluid Intake Pt resting on bipap                Anthropometrics     Row Name 01/10/22 1425          Anthropometrics    Height 167.6 cm (65.98\")     Weight 127 kg (279 lb 15.8 oz)            Ideal Body Weight (IBW)    Ideal Body Weight (IBW) (kg) 59.54     % Ideal Body Weight 213.3            Body Mass Index (BMI)    BMI (kg/m2) 45.31                Labs/Tests/Procedures/Meds     Row Name 01/10/22 1613          Labs/Procedures/Meds    Lab Results Reviewed reviewed, pertinent            Diagnostic Tests/Procedures    Diagnostic Test/Procedure Reviewed reviewed, pertinent            Medications    Pertinent Medications Reviewed reviewed, pertinent                Physical Findings     Row Name 01/10/22 1613          Physical Findings    Overall Physical Appearance obese; on oxygen therapy                Estimated/Assessed Needs     Row Name 01/10/22 1614 01/10/22 1425       Calculation Measurements    Weight Used For Calculations 59 kg (130 lb) --    Height -- 167.6 cm (65.98\")       Estimated/Assessed Needs    Additional Documentation " Additional Requirements (Group); Fluid Requirements (Group); [place] Equation (Group); Protein Requirements (Group); Calorie Requirements (Group) --       Calorie Requirements    Estimated Calorie Need Method [facility] --       [place] Equation    RMR ([facility] Equation) 1166.176 --    [facility] Activity Factors 1.4 - 1.5 --    Activity Factors ([facility]) 2637.2234 - 0852.264 --       Protein Requirements    Weight Used For Protein Calculations 59 kg (130 lb) --    Est Protein Requirement Amount (gms/kg) 1.4 gm protein --    Estimated Protein Requirements (gms/day) 82.56 --       Fluid Requirements    Fluid Requirements (mL/day) 1500 --    Estimated Fluid Requirement Method other (see comments)  fluid restrictions --    RDA Method (mL) 1500 --               Nutrition Prescription Ordered     Row Name 01/10/22 1615          Nutrition Prescription PO    Current PO Diet NPO                       Electronically signed by:  Gissel Littlejohn RD  01/10/22 16:19 CST

## 2022-01-10 NOTE — PLAN OF CARE
Goal Outcome Evaluation:  Plan of Care Reviewed With: patient, spouse        Progress: no change  Outcome Summary: new admit this shift from ED. Pt AMS. Requiring insulin gtt. PRN BP and pain meds given. UOP adequate. Febrile and on 3L NC. Will continue to monitor.

## 2022-01-10 NOTE — PLAN OF CARE
Goal Outcome Evaluation:  Plan of Care Reviewed With: spouse        Progress: improving  Outcome Summary: Pt taken off insulin gtt this am et started on long acting insulin as well as sliding scale. Pt remains lethargic et mostly non-verbal, except yes or no answers, at this time. VSS. Will continue to monitor.

## 2022-01-10 NOTE — SIGNIFICANT NOTE
This patient with known CKD has increasing troponins x3 in the presence of EKG changes with T wave inversion.  Patient presented with altered mental status unable to describe history of chest pain.  I consulted the patient's cardiologist Dr. Rios who recommended heparin and to continue to trend troponins.    Update: Dr. Rios called to let me know that although he did the cardiac catheterization on this patient, Dr. Bautista is the patient's Cardiologist. He requested that I change the consult order to Dr. Bautista so that he would see the patient in the morning. I have gone into the orders and done so now.      This document has been electronically signed by Alvarado Mauricio MD on January 9, 2022 20:03 CST

## 2022-01-10 NOTE — PROGRESS NOTES
"[facility]  [address] [place] [address]. [id number]  T - [phone]  F - [phone]     Progress Note          PATIENT  DEMOGRAPHICS   PATIENT NAME: Yamileth Slater                      PHYSICIAN: Ace Lauren MD  : 1960  MRN: [id number]   LOS: 0 days    Patient Care Team:  Rianna Macias MD as PCP - General (Family Medicine)  Subjective   SUBJECTIVE   Seen during hd - bp is stable . Makes reasonable urine         Objective   OBJECTIVE   Vital Signs  Temp:  [98.2 °F (36.8 °C)-101.2 °F (38.4 °C)] 99.1 °F (37.3 °C)  Heart Rate:  [] 92  Resp:  [16-24] 21  BP: (119-211)/(55-95) 140/56    Flowsheet Rows      First Filed Value   Admission Height 167.6 cm (66\") Documented at 2022 1304   Admission Weight 132 kg (291 lb 9.6 oz) Documented at 2022 1048           I/O last 3 completed shifts:  In: 3270 [I.V.:2528; IV Piggyback:742]  Out: 1285 [Urine:1285]    PHYSICAL EXAM    Physical Exam  Constitutional:       Appearance: She is well-developed.   HENT:      Head: Normocephalic.   Eyes:      Pupils: Pupils are equal, round, and reactive to light.   Cardiovascular:      Rate and Rhythm: Normal rate and regular rhythm.      Heart sounds: Normal heart sounds.   Pulmonary:      Effort: Pulmonary effort is normal.      Breath sounds: Normal breath sounds.   Abdominal:      General: Bowel sounds are normal.      Palpations: Abdomen is soft.   Musculoskeletal:         General: No swelling.   Skin:     Coloration: Skin is not jaundiced.   Neurological:      Mental Status: She is alert. She is disoriented.         RESULTS   Results Review:    Results from last 7 days   Lab Units 01/10/22  0820 01/10/22  0419 22  2357 22  1618 22  1214   SODIUM mmol/L 138 141 139   < > 129*   POTASSIUM mmol/L 2.8* 3.1* 2.9*   < > 4.0   CHLORIDE mmol/L 103 105 101   < > 87*   CO2 mmol/L 23.0 22.0 21.0*   < > 18.0*   BUN mg/dL 51* 52* 49*   < > 44*   CREATININE mg/dL 3.90* " 4.01* 3.93*   < > 3.46*   CALCIUM mg/dL 8.8 8.9 8.9   < > 9.0   BILIRUBIN mg/dL  --   --   --   --  0.2   ALK PHOS U/L  --   --   --   --  192*   ALT (SGPT) U/L  --   --   --   --  7   AST (SGOT) U/L  --   --   --   --  10   GLUCOSE mg/dL 136* 203* 311*   < > 800*    < > = values in this interval not displayed.       Estimated Creatinine Clearance: 20.4 mL/min (A) (by C-G formula based on SCr of 3.9 mg/dL (H)).    Results from last 7 days   Lab Units 01/10/22  0820 01/10/22  0419 01/09/22  2357 01/09/22  1618 01/09/22  1214   MAGNESIUM mg/dL  --   --   --   --  1.9   PHOSPHORUS mg/dL 3.2 3.0 2.7   < > 6.1*    < > = values in this interval not displayed.             Results from last 7 days   Lab Units 01/10/22  0419 01/09/22  1214   WBC 10*3/mm3 11.34* 17.78*   HEMOGLOBIN g/dL 8.5* 7.0*   PLATELETS 10*3/mm3 259 423       Results from last 7 days   Lab Units 01/09/22  1214   INR  1.10         Imaging Results (Last 24 Hours)     Procedure Component Value Units Date/Time    US Guidance PICC NC [[id number]] Resulted: 01/09/22 1251     Updated: 01/09/22 1251    Narrative:      This procedure was auto-finalized with no dictation required.    XR Chest Post CVA Port [[id number]] Collected: 01/09/22 1220     Updated: 01/09/22 1236    Narrative:        PORTABLE CHEST    HISTORY: PICC line placement    Portable AP upright film of the chest was obtained at 12:05 PM.  COMPARISON: 10:36 AM    FINDINGS:   Right PICC line now in place with tip projected over the proximal  superior vena cava.  Right internal jugular multilumen catheter remains in place with  tip projected over the proximal superior vena cava.  Sternotomy.  Cardiomegaly with minimal pulmonary vascular congestion and  interstitial edema.  Perhaps very small right pleural effusion.  Old granulomatous disease.  No pneumothorax.  No acute osseous abnormality.  Degenerative changes are present in the thoracic spine.      Impression:      CONCLUSION:  Right PICC line now in  place with tip projected over the proximal  superior vena cava.  Right internal jugular multilumen catheter remains in place with  tip projected over the proximal superior vena cava.  Sternotomy.  Cardiomegaly with minimal pulmonary vascular congestion and  interstitial edema.  Perhaps very small right pleural effusion.    [id number]    Electronically signed by:  Jon Patricia MD  1/9/2022 12:35 PM CST  Workstation: [id number]    IR PICC W Imaging Guidance [[id number]] Resulted: 01/09/22 1227     Updated: 01/09/22 1227    Narrative:      This procedure was auto-finalized with no dictation required.    XR Chest 1 View [[id number]] Collected: 01/09/22 1100     Updated: 01/09/22 1113    Narrative:      EXAM: XR CHEST 1 VIEW    COMPARISONS: Radiograph 12/27/2021    INDICATION: AMS    FINDINGS:  Frontal view of the chest.    Lungs are symmetric and adequately expanded. No focal  consolidation, effusion, or pneumothorax. No change in mildly  prominent central pulmonary vasculature and interstitial  markings. Stable cardiomegaly. No acute osseous abnormalities.  Sternotomy wires are again seen. Right IJ dialysis catheter is  seen.      Impression:      Unchanged cardiomegaly with probable mild pulmonary edema.    Electronically signed by:  Kamar Khanna MD  1/9/2022  11:12 AM CST Workstation: [id number]    CT Head Without Contrast [[id number]] Collected: 01/09/22 1035     Updated: 01/09/22 1053    Narrative:      EXAM: CT HEAD WITHOUT IV CONTRAST    COMPARISONS: CT head 3/31/2021    INDICATION: AMS    TECHNIQUE: Noncontrast CT images were obtained of the brain.  Reformats were provided. All CT scans at this facility uses dose  modulation, iterative reconstruction, and/or weight-based dosing  when appropriate to achieve a radiation dose as low as reasonably  achievable.    FINDINGS:    No intracranial hemorrhage, appreciable mass, mass effect or  midline shift.     There is preservation of the gray-white matter  differentiation.  No dense MCA or insular ribbon sign.    There is cerebral volume loss with ex vacuo ventricular  dilatation. Confluent and patchy periventricular hypodensities  are likely sequela of chronic ischemic microvascular disease.    Calvarium is intact. Paranasal sinuses are unremarkable. Mastoid  air cells are clear. Orbits are unremarkable.      Impression:      No acute intracranial process.    Unchanged sequela of chronic ischemic microvascular disease and  cerebral volume loss.    Electronically signed by:  Kamar Khanna MD  1/9/2022  10:52 AM [facility] Workstation: [id number]           MEDICATIONS    aspirin, 81 mg, Oral, Daily  atorvastatin, 20 mg, Oral, Daily  bumetanide, 2 mg, Oral, Once per day on Sun Tue Thu Sat  clopidogrel, 75 mg, Oral, Daily  gabapentin, 300 mg, Oral, Q8H  heparin (porcine), 5,000 Units, Subcutaneous, Q8H  insulin aspart, 0-9 Units, Subcutaneous, TID AC  insulin aspart, 30 Units, Subcutaneous, TID With Meals  insulin detemir, 30 Units, Subcutaneous, Q12H  [START ON 1/11/2022] insulin detemir, 60 Units, Subcutaneous, Nightly  ipratropium-albuterol, 3 mL, Nebulization, 4x Daily - RT  isosorbide mononitrate, 30 mg, Oral, QAM  levothyroxine, 25 mcg, Oral, Daily  lisinopril, 5 mg, Oral, Daily  metoprolol tartrate, 50 mg, Oral, Q12H  montelukast, 10 mg, Oral, Nightly  nystatin, , Topical, Q12H  oxybutynin XL, 5 mg, Oral, Daily  pantoprazole, 40 mg, Intravenous, Q AM  piperacillin-tazobactam, 3.375 g, Intravenous, Q12H  ranolazine, 500 mg, Oral, Q12H  senna-docusate sodium, 2 tablet, Oral, BID  sodium chloride, 10 mL, Intravenous, Q12H  sodium chloride, 3 mL, Intravenous, Q12H      O2, 3 L/min, Last Rate: 3 L/min (01/09/22 1809)  Pharmacy to dose vancomycin,   Pharmacy to Dose Zosyn,   sodium chloride, 10 mL/hr        Assessment/Plan   ASSESSMENT / PLAN      Type 2 diabetes mellitus with hyperosmolar hyperglycemic state (HHS) (HCC)    Hypertension    COPD (chronic obstructive  pulmonary disease) (HCC)    Coronary artery disease    Hypothyroidism    MIKE and COPD overlap syndrome (HCC)    Unspecified diastolic (congestive) heart failure (HCC)    Anemia due to chronic kidney disease, on chronic dialysis (HCC)    Personal history of noncompliance with medical treatment, presenting hazards to health    SIRS (systemic inflammatory response syndrome) (HCC)    Altered mental status    1.  ESRD on HD- Initiated HD on 10/21 due to hyperkalemia and fluid overload, now likely ESRD.  HD MWF for now.  hd today. 4 k bath. uf 1.5L today      2.  Hyponatremia- Na 129 in the setting of severe hyperglycemia, hyperosmolar hyponatremia. Now better with glucose correction     3.  HHS-started on insulin drip, managed per primary team. Off insulin drip     4.  History of CAD     5.  History of COPD - On trilogy at night but did not use it last night     6.  Anemia / previous GI bleed / b12 deficiency-  s/p capsule endoscopy which showed few small non-bleeding intestinal AVMs and single small polyp. Will likely require transfusion soon as her Hgb is 7 and will likely dilute further.     7.  HTN- Continue home antihypertensives when she can tolerate oral meds.                 This document has been electronically signed by Ace Lauren MD on January 10, 2022 10:15 CST

## 2022-01-10 NOTE — PROGRESS NOTES
CRITICAL CARE DAILY PROGRESS NOTE  [facility]  NAME: Yamileth Slater  : 1960  MRN: [id number]      LOS: 0 days     PROVIDER OF SERVICE: Luz Quijano MD    Chief Complaint: Type 2 diabetes mellitus with hyperosmolar hyperglycemic state (HHS) (HCC)    Subjective:     Interval History: History provided by: patient chart    Patient was seen resting in bed this morning. She is responsive to her name today. She appears less toxic than on admission yesterday. No acute overnight events. Glucose has been coming down. On admission was 800, now 203. Insulin drip will be stopped and subc insulin will be started. Will continue to monitor glucose. Cardiology is consulted and Dr. Handy is following.     Feeding: NPO; Dialysis patient  Analgesia: Morphine IV , Tylenol   Sedation: none  Thromboprophylaxis: Heparin  HOB: 30 degrees  Ulcer ppx: PPIs  Glucose control: SSI, Basal insulin and Prandial insulin  SBT: N/A  Bowel Regimen:Miralax and Colace   Indwelling catheters: PIC line, Day# 2; Forman catheter is in place.  De-escalation of antibiotics: Blood cultures prending    Review of Systems:   Review of Systems   Unable to perform ROS: Patient nonverbal       Objective:     Vital Signs  Temp:  [99 °F (37.2 °C)-101.2 °F (38.4 °C)] 99.1 °F (37.3 °C)  Heart Rate:  [] 95  Resp:  [16-24] 21  BP: ()/(52-95) 98/52  Body mass index is 45.08 kg/m².         I/O last 3 completed shifts:  In: 3270 [I.V.:2528; IV Piggyback:742]  Out: 1285 [Urine:1285]    Physical Exam  Physical Exam  Constitutional:       Appearance: She is obese.   HENT:      Head: Normocephalic and atraumatic.      Nose: Nose normal.      Mouth/Throat:      Mouth: Mucous membranes are moist.   Cardiovascular:      Rate and Rhythm: Normal rate and regular rhythm.      Pulses: Normal pulses.      Heart sounds: Normal heart sounds.   Pulmonary:      Effort: Pulmonary effort is normal.      Breath sounds: Rhonchi present.    Abdominal:      General: Abdomen is flat. Bowel sounds are normal.      Palpations: Abdomen is soft.   Musculoskeletal:         General: Normal range of motion.      Cervical back: Normal range of motion.   Skin:     General: Skin is warm and dry.      Capillary Refill: Capillary refill takes less than 2 seconds.   Neurological:      Mental Status: She is alert.   Psychiatric:         Mood and Affect: Mood normal.         Behavior: Behavior normal.         Medication Review    Current Facility-Administered Medications:   •  acetaminophen (TYLENOL) tablet 650 mg, 650 mg, Oral, Q4H PRN **OR** acetaminophen (TYLENOL) 160 MG/5ML solution 650 mg, 650 mg, Oral, Q4H PRN **OR** acetaminophen (TYLENOL) suppository 650 mg, 650 mg, Rectal, Q4H PRN, Luz Quijano MD  •  albumin human 25 % IV SOLN 12.5 g, 12.5 g, Intravenous, PRN, Ace Lauren MD  •  albuterol (PROVENTIL) nebulizer solution 0.083% 2.5 mg/3mL, 2.5 mg, Nebulization, Q4H PRN, Luz Quijano MD  •  aspirin EC tablet 81 mg, 81 mg, Oral, Daily, Luz Quijano MD  •  atorvastatin (LIPITOR) tablet 20 mg, 20 mg, Oral, Daily, Luz Quijano MD  •  sennosides-docusate (PERICOLACE) 8.6-50 MG per tablet 2 tablet, 2 tablet, Oral, BID **AND** polyethylene glycol (MIRALAX) packet 17 g, 17 g, Oral, Daily PRN **AND** bisacodyl (DULCOLAX) EC tablet 5 mg, 5 mg, Oral, Daily PRN **AND** bisacodyl (DULCOLAX) suppository 10 mg, 10 mg, Rectal, Daily PRN, Luz Quijano MD  •  bumetanide (BUMEX) tablet 2 mg, 2 mg, Oral, Once per day on Sun Tue Thu Sat, Luz Quijano MD  •  clopidogrel (PLAVIX) tablet 75 mg, 75 mg, Oral, Daily, Luz Quijano MD  •  dextrose (D50W) (25 g/50 mL) IV injection 12.5 g, 12.5 g, Intravenous, PRN, Luz Quijano MD  •  dextrose (D50W) (25 g/50 mL) IV injection 25 g, 25 g, Intravenous, Q15 Min PRN, Paulina Solano MD  •  dextrose (GLUTOSE) oral gel 15 g, 15 g, Oral, Q15 Min PRN, Paulina Solano MD  •  gabapentin (NEURONTIN) capsule 300 mg, 300 mg,  Oral, Q8H, Luz Quijano MD  •  glucagon (human recombinant) (GLUCAGEN DIAGNOSTIC) injection 1 mg, 1 mg, Subcutaneous, Q15 Min PRN, Paulina Solano MD  •  heparin (porcine) 5000 UNIT/ML injection 5,000 Units, 5,000 Units, Subcutaneous, Q8H, Alvarado Mauricio MD, 5,000 Units at 01/10/22 0526  •  heparin (porcine) injection 2,000 Units, 2,000 Units, Intracatheter, PRN, Ace Lauren MD, 2,000 Units at 01/10/22 0805  •  hydrALAZINE (APRESOLINE) injection 10 mg, 10 mg, Intravenous, Q6H PRN, Jm Barrera APRN, 10 mg at 01/09/22 1628  •  insulin aspart (novoLOG) injection 0-9 Units, 0-9 Units, Subcutaneous, TID AC, Paulina Solano MD  •  insulin aspart (novoLOG) injection 30 Units, 30 Units, Subcutaneous, TID With Meals, Paulina Solano MD  •  insulin detemir (LEVEMIR) injection 30 Units, 30 Units, Subcutaneous, Q12H, Paulina Solano MD, 30 Units at 01/10/22 0749  •  [START ON 1/11/2022] insulin detemir (LEVEMIR) injection 60 Units, 60 Units, Subcutaneous, Nightly, Paulina Solano MD  •  ipratropium-albuterol (DUO-NEB) nebulizer solution 3 mL, 3 mL, Nebulization, 4x Daily - RT, Luz Quijano MD, 3 mL at 01/10/22 1040  •  isosorbide mononitrate (IMDUR) 24 hr tablet 30 mg, 30 mg, Oral, QAM, Luz Quijano MD  •  labetalol (NORMODYNE,TRANDATE) injection 20 mg, 20 mg, Intravenous, Q6H PRN, Luz Quijano MD, 20 mg at 01/09/22 1737  •  levothyroxine (SYNTHROID, LEVOTHROID) tablet 25 mcg, 25 mcg, Oral, Daily, Luz Quijano MD  •  lisinopril (PRINIVIL,ZESTRIL) tablet 5 mg, 5 mg, Oral, Daily, Luz Quijano MD  •  metoprolol tartrate (LOPRESSOR) tablet 50 mg, 50 mg, Oral, Q12H, Luz Quijano MD  •  montelukast (SINGULAIR) tablet 10 mg, 10 mg, Oral, Nightly, Luz Quijano MD  •  morphine injection 1 mg, 1 mg, Intravenous, Q4H PRN, Luz Quijano MD, 1 mg at 01/09/22 1737  •  nystatin (MYCOSTATIN) powder, , Topical, Q12H, Luz Quijano MD, Given at 01/10/22 0825  •  O2 (OXYGEN), 3 L/min, Inhalation,  Continuous, Luz Quijano MD, Last Rate: 180,000 mL/hr at 01/09/22 1809, 3 L/min at 01/09/22 1809  •  ondansetron (ZOFRAN) injection 4 mg, 4 mg, Intravenous, Q6H PRN, Luz Quijano MD  •  oxybutynin XL (DITROPAN-XL) 24 hr tablet 5 mg, 5 mg, Oral, Daily, Luz Quijano MD  •  pantoprazole (PROTONIX) injection 40 mg, 40 mg, Intravenous, Q AM, Luz Quijano MD, 40 mg at 01/10/22 0525  •  Pharmacy to dose vancomycin, , Does not apply, Continuous PRNSamm Tehmina, MD  •  Pharmacy to Dose Zosyn, , Does not apply, Continuous PRKINJAL, Luz Quijano MD  •  piperacillin-tazobactam (ZOSYN) 3.375 g/100 mL 0.9% NS IVPB (mbp), 3.375 g, Intravenous, Q12H, Huy Santa MD  •  ranolazine (RANEXA) 12 hr tablet 500 mg, 500 mg, Oral, Q12H, Luz Quijano MD  •  sodium chloride 0.9 % flush 10 mL, 10 mL, Intravenous, PRN, Luz Quijano MD  •  sodium chloride 0.9 % flush 10 mL, 10 mL, Intravenous, Once PRN, Luz Quijano MD  •  sodium chloride 0.9 % flush 10 mL, 10 mL, Intravenous, Q12H, Luz Quijano MD, 10 mL at 01/10/22 0827  •  sodium chloride 0.9 % flush 10 mL, 10 mL, Intravenous, PRN, Luz Quijano MD  •  sodium chloride 0.9 % flush 3 mL, 3 mL, Intravenous, Q12H, Luz Quijano MD, Given at 01/10/22 0825  •  sodium chloride 0.9 % infusion, 10 mL/hr, Intravenous, Continuous PRSamm LAYTON Tehmina, MD     Diagnostic Data    Lab Results (last 24 hours)     Procedure Component Value Units Date/Time    POC Glucose Once [[id number]]  (Abnormal) Collected: 01/09/22 2218    Specimen: Blood Updated: 01/10/22 1104     Glucose 441 mg/dL      Comment: : [phone] MAG HEATHERMeter ID: [id number]       POC Glucose Once [[id number]]  (Abnormal) Collected: 01/09/22 2121    Specimen: Blood Updated: 01/10/22 1104     Glucose 505 mg/dL      Comment: : [id number] MAG HEATHERMeter ID: [id number]       POC Glucose Once [[id number]]  (Abnormal) Collected: 01/09/22 2016    Specimen: Blood Updated: 01/10/22 1103     Glucose  571 mg/dL      Comment: : [id number] MAG HEATHERMeter ID: [id number]       Troponin [[id number]]  (Abnormal) Collected: 01/10/22 0820    Specimen: Blood Updated: 01/10/22 0852     Troponin T 0.626 ng/mL     Narrative:      Troponin T Reference Range:  <= 0.03 ng/mL-   Negative for AMI  >0.03 ng/mL-     Abnormal for myocardial necrosis.  Clinicians would have to utilize clinical acumen, EKG, Troponin and serial changes to determine if it is an Acute Myocardial Infarction or myocardial injury due to an underlying chronic condition.       Results may be falsely decreased if patient taking Biotin.      Basic Metabolic Panel [[id number]]  (Abnormal) Collected: 01/10/22 0820    Specimen: Blood Updated: 01/10/22 0849     Glucose 136 mg/dL      BUN 51 mg/dL      Creatinine 3.90 mg/dL      Sodium 138 mmol/L      Potassium 2.8 mmol/L      Chloride 103 mmol/L      CO2 23.0 mmol/L      Calcium 8.8 mg/dL      eGFR   Amer --     Comment: <15 Indicative of kidney failure.        eGFR Non African Amer 12 mL/min/1.73      Comment: <15 Indicative of kidney failure.        BUN/Creatinine Ratio 13.1     Anion Gap 12.0 mmol/L     Narrative:      GFR Normal >60  Chronic Kidney Disease <60  Kidney Failure <15      Phosphorus [[id number]]  (Normal) Collected: 01/10/22 0820    Specimen: Blood Updated: 01/10/22 0849     Phosphorus 3.2 mg/dL     POC Glucose Once [[id number]]  (Normal) Collected: 01/10/22 0821    Specimen: Blood Updated: 01/10/22 0833     Glucose 127 mg/dL      Comment: : [id number] YESICA AUDREYMeter ID: [id number]       POC Glucose Once [[id number]]  (Abnormal) Collected: 01/10/22 0524    Specimen: Blood Updated: 01/10/22 0540     Glucose 193 mg/dL      Comment: : [id number] MAG HEATHERMeter ID: [id number]       POC Glucose Once [[id number]]  (Abnormal) Collected: 01/10/22 0420    Specimen: Blood Updated: 01/10/22 0540     Glucose 204 mg/dL      Comment: : [phone] MAG  HEATHERMeter ID: [id number]       Troponin [[id number]]  (Abnormal) Collected: 01/10/22 0419    Specimen: Blood Updated: 01/10/22 0538     Troponin T 0.629 ng/mL     Narrative:      Troponin T Reference Range:  <= 0.03 ng/mL-   Negative for AMI  >0.03 ng/mL-     Abnormal for myocardial necrosis.  Clinicians would have to utilize clinical acumen, EKG, Troponin and serial changes to determine if it is an Acute Myocardial Infarction or myocardial injury due to an underlying chronic condition.       Results may be falsely decreased if patient taking Biotin.      Basic Metabolic Panel [[id number]]  (Abnormal) Collected: 01/10/22 0419    Specimen: Blood Updated: 01/10/22 0525     Glucose 203 mg/dL      BUN 52 mg/dL      Creatinine 4.01 mg/dL      Sodium 141 mmol/L      Potassium 3.1 mmol/L      Chloride 105 mmol/L      CO2 22.0 mmol/L      Calcium 8.9 mg/dL      eGFR   Amer --     Comment: <15 Indicative of kidney failure.        eGFR Non African Amer 11 mL/min/1.73      Comment: <15 Indicative of kidney failure.        BUN/Creatinine Ratio 13.0     Anion Gap 14.0 mmol/L     Narrative:      GFR Normal >60  Chronic Kidney Disease <60  Kidney Failure <15      Phosphorus [[id number]]  (Normal) Collected: 01/10/22 0419    Specimen: Blood Updated: 01/10/22 0525     Phosphorus 3.0 mg/dL     CBC (No Diff) [[id number]]  (Abnormal) Collected: 01/10/22 0419    Specimen: Blood Updated: 01/10/22 0459     WBC 11.34 10*3/mm3      RBC 2.95 10*6/mm3      Hemoglobin 8.5 g/dL      Hematocrit 25.6 %      MCV 86.8 fL      MCH 28.8 pg      MCHC 33.2 g/dL      RDW 16.0 %      RDW-SD 50.7 fl      MPV 8.9 fL      Platelets 259 10*3/mm3     POC Glucose Once [[id number]]  (Abnormal) Collected: 01/10/22 0325    Specimen: Blood Updated: 01/10/22 0350     Glucose 204 mg/dL      Comment: : [id number] MAG HEATHERMeter ID: [id number]       POC Glucose Once [[id number]]  (Abnormal) Collected: 01/10/22 0236    Specimen: Blood Updated:  01/10/22 0350     Glucose 214 mg/dL      Comment: : [id number] MAG HEATHERMeter ID: [id number]       POC Glucose Once [[id number]]  (Abnormal) Collected: 01/10/22 0122    Specimen: Blood Updated: 01/10/22 0350     Glucose 240 mg/dL      Comment: : [id number] MAG HEATHERMeter ID: [id number]       POC Glucose Once [[id number]]  (Abnormal) Collected: 01/10/22 0018    Specimen: Blood Updated: 01/10/22 0350     Glucose 282 mg/dL      Comment: : [id number] MAG HEATHERMeter ID: [id number]       POC Glucose Once [[id number]]  (Abnormal) Collected: 01/09/22 2307    Specimen: Blood Updated: 01/10/22 0349     Glucose 376 mg/dL      Comment: : [id number] MAG HEATHERMeter ID: [id number]       Basic Metabolic Panel [[id number]]  (Abnormal) Collected: 01/09/22 2357    Specimen: Blood Updated: 01/10/22 0047     Glucose 311 mg/dL      BUN 49 mg/dL      Creatinine 3.93 mg/dL      Sodium 139 mmol/L      Potassium 2.9 mmol/L      Chloride 101 mmol/L      CO2 21.0 mmol/L      Calcium 8.9 mg/dL      eGFR   Amer --     Comment: <15 Indicative of kidney failure.        eGFR Non African Amer 12 mL/min/1.73      Comment: <15 Indicative of kidney failure.        BUN/Creatinine Ratio 12.5     Anion Gap 17.0 mmol/L     Narrative:      GFR Normal >60  Chronic Kidney Disease <60  Kidney Failure <15      Phosphorus [[id number]]  (Normal) Collected: 01/09/22 2357    Specimen: Blood Updated: 01/10/22 0047     Phosphorus 2.7 mg/dL     Troponin [[id number]]  (Abnormal) Collected: 01/09/22 2357    Specimen: Blood Updated: 01/10/22 0047     Troponin T 0.673 ng/mL     Narrative:      Troponin T Reference Range:  <= 0.03 ng/mL-   Negative for AMI  >0.03 ng/mL-     Abnormal for myocardial necrosis.  Clinicians would have to utilize clinical acumen, EKG, Troponin and serial changes to determine if it is an Acute Myocardial Infarction or myocardial injury due to an underlying chronic condition.        Results may be falsely decreased if patient taking Biotin.      Basic Metabolic Panel [[id number]]  (Abnormal) Collected: 01/09/22 1618    Specimen: Blood Updated: 01/09/22 2248     Glucose 865 mg/dL      BUN 46 mg/dL      Creatinine 3.79 mg/dL      Sodium 132 mmol/L      Potassium 3.8 mmol/L      Chloride 89 mmol/L      CO2 17.0 mmol/L      Calcium 9.2 mg/dL      eGFR   Amer --     Comment: <15 Indicative of kidney failure.        eGFR Non African Amer 12 mL/min/1.73      Comment: <15 Indicative of kidney failure.        BUN/Creatinine Ratio 12.1     Anion Gap 26.0 mmol/L     Narrative:      GFR Normal >60  Chronic Kidney Disease <60  Kidney Failure <15      Troponin [[id number]]  (Abnormal) Collected: 01/09/22 1828    Specimen: Blood Updated: 01/09/22 1946     Troponin T 0.451 ng/mL     Narrative:      Troponin T Reference Range:  <= 0.03 ng/mL-   Negative for AMI  >0.03 ng/mL-     Abnormal for myocardial necrosis.  Clinicians would have to utilize clinical acumen, EKG, Troponin and serial changes to determine if it is an Acute Myocardial Infarction or myocardial injury due to an underlying chronic condition.       Results may be falsely decreased if patient taking Biotin.      Phosphorus [[id number]]  (Normal) Collected: 01/09/22 1828    Specimen: Blood Updated: 01/09/22 1946     Phosphorus 3.2 mg/dL     Basic Metabolic Panel [[id number]]  (Abnormal) Collected: 01/09/22 1828    Specimen: Blood Updated: 01/09/22 1945     Glucose 721 mg/dL      BUN 47 mg/dL      Creatinine 3.87 mg/dL      Sodium 133 mmol/L      Potassium 3.4 mmol/L      Chloride 94 mmol/L      CO2 18.0 mmol/L      Calcium 8.7 mg/dL      eGFR   Amer --     Comment: <15 Indicative of kidney failure.        eGFR Non African Amer 12 mL/min/1.73      Comment: <15 Indicative of kidney failure.        BUN/Creatinine Ratio 12.1     Anion Gap 21.0 mmol/L     Narrative:      GFR Normal >60  Chronic Kidney Disease <60  Kidney Failure <15       Glucose, Random [[id number]]  (Abnormal) Collected: 01/09/22 1828    Specimen: Blood Updated: 01/09/22 1921     Glucose 711 mg/dL     Extra Tubes [[id number]] Collected: 01/09/22 1618    Specimen: Blood, Venous Line Updated: 01/09/22 1730    Narrative:      The following orders were created for panel order Extra Tubes.  Procedure                               Abnormality         Status                     ---------                               -----------         ------                     Green Top (Gel)[[id number]]                                  Final result               Green Top (Gel)[[id number]]                                  Final result                 Please view results for these tests on the individual orders.    Green Top (Gel) [[id number]] Collected: 01/09/22 1618    Specimen: Blood Updated: 01/09/22 1730     Extra Tube Hold for add-ons.     Comment: Auto resulted.       Green Top (Gel) [[id number]] Collected: 01/09/22 1618    Specimen: Blood Updated: 01/09/22 1730     Extra Tube Hold for add-ons.     Comment: Auto resulted.       Extra Tubes [[id number]] Collected: 01/09/22 1627    Specimen: Blood, Venous Line Updated: 01/09/22 1730    Narrative:      The following orders were created for panel order Extra Tubes.  Procedure                               Abnormality         Status                     ---------                               -----------         ------                     Lavender Top[[id number]]                                     Final result                 Please view results for these tests on the individual orders.    Lavender Top [[id number]] Collected: 01/09/22 1627    Specimen: Blood Updated: 01/09/22 1730     Extra Tube hold for add-on     Comment: Auto resulted       Basic Metabolic Panel [[id number]]  (Abnormal) Collected: 01/09/22 1618    Specimen: Blood Updated: 01/09/22 1709     Glucose 783 mg/dL      BUN 47 mg/dL      Creatinine 3.43 mg/dL      Sodium 133 mmol/L      Potassium  "3.9 mmol/L      Chloride 90 mmol/L      CO2 18.0 mmol/L      Calcium 8.9 mg/dL      eGFR   Amer --     Comment: <15 Indicative of kidney failure.        eGFR Non African Amer 14 mL/min/1.73      Comment: <15 Indicative of kidney failure.        BUN/Creatinine Ratio 13.7     Anion Gap 25.0 mmol/L     Narrative:      GFR Normal >60  Chronic Kidney Disease <60  Kidney Failure <15      Troponin [[id number]]  (Abnormal) Collected: 01/09/22 1618    Specimen: Blood Updated: 01/09/22 1709     Troponin T 0.387 ng/mL     Narrative:      Troponin T Reference Range:  <= 0.03 ng/mL-   Negative for AMI  >0.03 ng/mL-     Abnormal for myocardial necrosis.  Clinicians would have to utilize clinical acumen, EKG, Troponin and serial changes to determine if it is an Acute Myocardial Infarction or myocardial injury due to an underlying chronic condition.       Results may be falsely decreased if patient taking Biotin.      STAT Lactic Acid, Reflex [[id number]]  (Abnormal) Collected: 01/09/22 1618    Specimen: Blood Updated: 01/09/22 1707     Lactate 4.8 mmol/L     Procalcitonin [[id number]]  (Abnormal) Collected: 01/09/22 1618    Specimen: Blood Updated: 01/09/22 1701     Procalcitonin 0.29 ng/mL     Narrative:      As a Marker for Sepsis (Non-Neonates):     1. <0.5 ng/mL represents a low risk of severe sepsis and/or septic shock.  2. >2 ng/mL represents a high risk of severe sepsis and/or septic shock.    As a Marker for Lower Respiratory Tract Infections that require antibiotic therapy:  PCT on Admission     Antibiotic Therapy             6-12 Hrs later  >0.5                          Strongly Recommended            >0.25 - <0.5             Recommended  0.1 - 0.25                  Discouraged                       Remeasure/reassess PCT  <0.1                         Strongly Discouraged         Remeasure/reassess PCT      As 28 day mortality risk marker: \"Change in Procalcitonin Result\" (>80% or <=80%) if Day 0 (or Day 1) and Day 4 " values are available. Refer to http://www.[facility]-pct-calculator.com/    Change in PCT <=80 %   A decrease of PCT levels below or equal to 80% defines a positive change in PCT test result representing a higher risk for 28-day all-cause mortality of patients diagnosed with severe sepsis or septic shock.    Change in PCT >80 %   A decrease of PCT levels of more than 80% defines a negative change in PCT result representing a lower risk for 28-day all-cause mortality of patients diagnosed with severe sepsis or septic shock.                Phosphorus [[id number]]  (Abnormal) Collected: 01/09/22 1618    Specimen: Blood Updated: 01/09/22 1653     Phosphorus 5.1 mg/dL     Blood Culture - Blood, Arm, Left [[id number]] Collected: 01/09/22 1618    Specimen: Blood from Arm, Left Updated: 01/09/22 1626    Blood Culture - Blood, Hand, Left [[id number]] Collected: 01/09/22 1619    Specimen: Blood from Hand, Left Updated: 01/09/22 1626    Osmolality, Serum [[id number]]  (Abnormal) Collected: 01/09/22 1214    Specimen: Blood Updated: 01/09/22 1541     Osmolality 341 mOsm/kg     Phosphorus [[id number]]  (Abnormal) Collected: 01/09/22 1214    Specimen: Blood Updated: 01/09/22 1527     Phosphorus 6.1 mg/dL     Urinalysis, Microscopic Only - Urine, Catheter [[id number]]  (Abnormal) Collected: 01/09/22 1450    Specimen: Urine, Catheter Updated: 01/09/22 1519     RBC, UA 0-2 /HPF      WBC, UA 0-2 /HPF      Bacteria, UA Trace /HPF      Squamous Epithelial Cells, UA 0-2 /HPF      Yeast, UA Small/1+ Yeast /HPF      Hyaline Casts, UA None Seen /LPF      Methodology Manual Light Microscopy    Urine Drug Screen - Urine, Clean Catch [[id number]]  (Normal) Collected: 01/09/22 1450    Specimen: Urine, Clean Catch Updated: 01/09/22 1505     THC, Screen, Urine Negative     Phencyclidine (PCP), Urine Negative     Cocaine Screen, Urine Negative     Methamphetamine, Ur Negative     Opiate Screen Negative     Amphetamine Screen, Urine Negative      Benzodiazepine Screen, Urine Negative     Tricyclic Antidepressants Screen Negative     Methadone Screen, Urine Negative     Barbiturates Screen, Urine Negative     Oxycodone Screen, Urine Negative     Propoxyphene Screen Negative     Buprenorphine, Screen, Urine Negative    Narrative:      Cutoff For Drugs Screened:    Amphetamines               500 ng/ml  Barbiturates               200 ng/ml  Benzodiazepines            150 ng/ml  Cocaine                    150 ng/ml  Methadone                  200 ng/ml  Opiates                    100 ng/ml  Phencyclidine               25 ng/ml  THC                            50 ng/ml  Methamphetamine            500 ng/ml  Tricyclic Antidepressants  300 ng/ml  Oxycodone                  100 ng/ml  Propoxyphene               300 ng/ml  Buprenorphine               10 ng/ml    The normal value for all drugs tested is negative. This report includes unconfirmed screening results, with the cutoff values listed, to be used for medical treatment purposes only.  Unconfirmed results must not be used for non-medical purposes such as employment or legal testing.  Clinical consideration should be applied to any drug of abuse test, particularly when unconfirmed results are used.      Urinalysis With Culture If Indicated - Urine, Catheter [[id number]]  (Abnormal) Collected: 01/09/22 1450    Specimen: Urine, Catheter Updated: 01/09/22 1500     Color, UA Yellow     Appearance, UA Clear     pH, UA 7.0     Specific Gravity, UA 1.022     Glucose, UA >=1000 mg/dL (3+)     Ketones, UA 15 mg/dL (1+)     Bilirubin, UA Negative     Blood, UA Small (1+)     Protein, UA >=300 mg/dL (3+)     Leuk Esterase, UA Negative     Nitrite, UA Negative     Urobilinogen, UA 0.2 E.U./dL    COVID-19 and FLU A/B PCR - Swab, Nasopharynx [[id number]]  (Normal) Collected: 01/09/22 1413    Specimen: Swab from Nasopharynx Updated: 01/09/22 1437     COVID19 Not Detected     Influenza A PCR Not Detected     Influenza B PCR Not  Detected    Narrative:      Fact sheet for providers: https://www.fda.gov/media/438213/download    Fact sheet for patients: https://www.fda.gov/media/669499/download    Test performed by PCR.    Acetone [[id number]]  (Abnormal) Collected: 01/09/22 1214    Specimen: Blood Updated: 01/09/22 1348     Acetone Moderate    Lactic Acid, Plasma [[id number]]  (Abnormal) Collected: 01/09/22 1214    Specimen: Blood Updated: 01/09/22 1321     Lactate 2.5 mmol/L     Extra Tubes [[id number]] Collected: 01/09/22 1214    Specimen: Blood Updated: 01/09/22 1315    Narrative:      The following orders were created for panel order Extra Tubes.  Procedure                               Abnormality         Status                     ---------                               -----------         ------                     Gold Top - SST[[id number]]                                   Final result                 Please view results for these tests on the individual orders.    Gold Top - SST [[id number]] Collected: 01/09/22 1214    Specimen: Blood Updated: 01/09/22 1315     Extra Tube Hold for add-ons.     Comment: Auto resulted.       Comprehensive Metabolic Panel [[id number]]  (Abnormal) Collected: 01/09/22 1214    Specimen: Blood Updated: 01/09/22 1305     Glucose 800 mg/dL      BUN 44 mg/dL      Creatinine 3.46 mg/dL      Sodium 129 mmol/L      Potassium 4.0 mmol/L      Chloride 87 mmol/L      CO2 18.0 mmol/L      Calcium 9.0 mg/dL      Total Protein 8.0 g/dL      Albumin 3.80 g/dL      ALT (SGPT) 7 U/L      AST (SGOT) 10 U/L      Alkaline Phosphatase 192 U/L      Total Bilirubin 0.2 mg/dL      eGFR Non African Amer 13 mL/min/1.73      Comment: <15 Indicative of kidney failure.        eGFR   Amer --     Comment: <15 Indicative of kidney failure.        Globulin 4.2 gm/dL      A/G Ratio 0.9 g/dL      BUN/Creatinine Ratio 12.7     Anion Gap 24.0 mmol/L     Narrative:      GFR Normal >60  Chronic Kidney Disease <60  Kidney Failure <15       Troponin [[id number]]  (Abnormal) Collected: 01/09/22 1214    Specimen: Blood Updated: 01/09/22 1257     Troponin T 0.268 ng/mL     Narrative:      Troponin T Reference Range:  <= 0.03 ng/mL-   Negative for AMI  >0.03 ng/mL-     Abnormal for myocardial necrosis.  Clinicians would have to utilize clinical acumen, EKG, Troponin and serial changes to determine if it is an Acute Myocardial Infarction or myocardial injury due to an underlying chronic condition.       Results may be falsely decreased if patient taking Biotin.      aPTT [[id number]]  (Normal) Collected: 01/09/22 1214    Specimen: Blood Updated: 01/09/22 1252     PTT 23.2 seconds     Narrative:      The recommended Heparin therapeutic range is 68-97 seconds.    Protime-INR [[id number]]  (Normal) Collected: 01/09/22 1214    Specimen: Blood Updated: 01/09/22 1252     Protime 14.1 Seconds      INR 1.10    Narrative:      Therapeutic range for most indications is 2.0-3.0 INR,  or 2.5-3.5 for mechanical heart valves.    Lipase [[id number]]  (Normal) Collected: 01/09/22 1214    Specimen: Blood Updated: 01/09/22 1251     Lipase 54 U/L     Ethanol [[id number]] Collected: 01/09/22 1214    Specimen: Blood Updated: 01/09/22 1251     Ethanol <10 mg/dL      Ethanol % <0.010 %     Magnesium [[id number]]  (Normal) Collected: 01/09/22 1214    Specimen: Blood Updated: 01/09/22 1251     Magnesium 1.9 mg/dL     BNP [[id number]]  (Abnormal) Collected: 01/09/22 1214    Specimen: Blood Updated: 01/09/22 1249     proBNP 18,229.0 pg/mL     Narrative:      Among patients with dyspnea, NT-proBNP is highly sensitive for the detection of acute congestive heart failure. In addition NT-proBNP of <300 pg/ml effectively rules out acute congestive heart failure with 99% negative predictive value.    Results may be falsely decreased if patient taking Biotin.      CBC & Differential [[id number]]  (Abnormal) Collected: 01/09/22 1214    Specimen: Blood Updated: 01/09/22 1230    Narrative:      The  following orders were created for panel order CBC & Differential.  Procedure                               Abnormality         Status                     ---------                               -----------         ------                     CBC Auto Differential[[id number]]        Abnormal            Final result                 Please view results for these tests on the individual orders.    CBC Auto Differential [[id number]]  (Abnormal) Collected: 01/09/22 1214    Specimen: Blood Updated: 01/09/22 1230     WBC 17.78 10*3/mm3      RBC 2.44 10*6/mm3      Hemoglobin 7.0 g/dL      Hematocrit 22.1 %      MCV 90.6 fL      MCH 28.7 pg      MCHC 31.7 g/dL      RDW 16.0 %      RDW-SD 53.4 fl      MPV 9.1 fL      Platelets 423 10*3/mm3      Neutrophil % 86.4 %      Lymphocyte % 8.1 %      Monocyte % 3.5 %      Eosinophil % 0.0 %      Basophil % 0.4 %      Immature Grans % 1.6 %      Neutrophils, Absolute 15.36 10*3/mm3      Lymphocytes, Absolute 1.44 10*3/mm3      Monocytes, Absolute 0.62 10*3/mm3      Eosinophils, Absolute 0.00 10*3/mm3      Basophils, Absolute 0.08 10*3/mm3      Immature Grans, Absolute 0.28 10*3/mm3      nRBC 0.0 /100 WBC     Blood Culture - Blood, Arm, Left [[id number]] Collected: 01/09/22 1214    Specimen: Blood from Arm, Left Updated: 01/09/22 1218    Blood Gas, Arterial - [[id number]]  (Abnormal) Collected: 01/09/22 1113    Specimen: Arterial Blood Updated: 01/09/22 1131     Site Left Brachial     Shadi's Test N/A     pH, Arterial 7.316 pH units      Comment: 84 Value below reference range        pCO2, Arterial 34.9 mm Hg      Comment: 84 Value below reference range        pO2, Arterial 98.5 mm Hg      HCO3, Arterial 17.8 mmol/L      Comment: 84 Value below reference range        Base Excess, Arterial -7.5 mmol/L      Comment: 84 Value below reference range        O2 Saturation, Arterial 97.9 %      Barometric Pressure for Blood Gas 754 mmHg      Modality Nasal Cannula     Flow Rate 3.0 lpm       Ventilator Mode NA     Collected by KS     Comment: Meter: [id number]     :  [id number]             I reviewed the patient's new clinical results.    Assessment/Plan:     Active Hospital Problems    Diagnosis  POA   • **Type 2 diabetes mellitus with hyperosmolar hyperglycemic state (HHS) ([facility]) [E11.00, E11.65]  Yes     Priority: High     - Admission glucose 800, anion gap 24, pH 7.3, CO2 34.9  - A1c (oct 2021) - 8.8  -HHS protocol started.   - Insulin Drip, overlap with Subc insulin once criteria met     - 1L fluids in ED. Caution with fluids as dialysis patient   - BMP, Phosphorus, Magnesium Q4rihut   - Moderate Serum Acetone   -Beta hydroxybutyrate pending   -NPO   - Nephrology consulted. Appreciate recommendations  - Picc line in place  - Hold home Diabetes meds  - Dialysis patient MWF (received last dialysis on Friday 1/7/2022)  - Magnesium on admission 1.9  - Protonix 40mg stress ulcer prophylaxis       • SIRS (systemic inflammatory response syndrome) ([facility]) [R65.10]  Yes     Priority: High     Admission   - WBC 17.78    -    - RR 16  - 1/10: VSS/wnl  - CXR - Mild pulm edema  - Blood cultures pending x2  - Procalcitonin 0.29  - UA : glucose 1000, negative Leucocytes/nitrate  - Empiric Zosyn and Vancomcyin      • Altered mental status [R41.82]  Yes     - AMS on presentation  - initial ABG pH 7.3, CO2 34  - Procal 0.29  - UA negative for acute cysitits  -CTA head wnl   - Empiric Zosyn and Vancomycin  -Lactate 2.5 on admission   - blood cultures pending       • Personal history of noncompliance with medical treatment, presenting hazards to health [Z91.19]  Not Applicable     · Difficulty showing up to clinic visits due to complexity of medical problems and transportation  · Would really benefit from a couple home visits from PCP to help improve compliance      • Anemia due to chronic kidney disease, on chronic dialysis ([facility]) [N18.6, D63.1, Z99.2]  Not Applicable     - MWF Dialysis patient  -  Epoetin (Retacrit) 10,000 units three times a week on dialysis days MWF  - Hg 7 on admission. Transfuse if Hg less than 7  - Type and screen done   - Daily CBC   -Cr. 3.4 on admission         • Unspecified diastolic (congestive) heart failure (HCC) [I50.30]  Yes     - Continue Imdur, metoprolol, lisinopril, bumex  - Most recent echo 10/2021 : EF 63%  - pro-BNP 11569  -Troponin 0.387, 0.451, 0.673  - Repeat EKG : NSR with t-wave inversion  -Repeat Echo pending  -Cardiology on board (Dr. Handy) - appreciate recommendations  - MWF Dialysis patient, caution with HHS fluids      • MIKE and COPD overlap syndrome ([facility]) [G47.33, J44.9]  Yes     · Home Trilogy/CPAP  · Home 3L oxygen dependent. Maintain strict Sats between 88-92%      • Hypothyroidism [E03.9]  Yes     Continue home Levothyroxine 25mcg, stable     • Coronary artery disease [I25.10]  Yes     · S/P CABG x 3 (Primary), Bilateral carotid artery stenosis w/ 2 stents on left side,  PAD (peripheral artery disease) with stent in right upper leg  · Continue   - aspirin  - Plavix 75mg daily  - atorvastatin 20 mg daily  - lisinopril 5mg daily  -lopressor 50mg   -Imdur 30mg daily  - Ranexa - 500mg BID   · Troponin 0.26, Trend x2   · EKG - sinus tachycardia             • COPD (chronic obstructive pulmonary disease) ([facility]) [J44.9]  Yes     - Dependent on 3L NC at Home  - Maintain strict sats between 88-92%  -Singulair 10mg   -DuoNebs  -NIPPV via Respiratory. Patient uses Trilogy at home/night      • Hypertension [I10]  Yes     · Elevated BP in ER   · Metoprolol 50mg bid, hold if HR < 60bpm  · Lisinopril 5mg daily  · Telemetry  · Hydralazine 10mg prn q6h for SBP > 170  · Labetalol 20mg Q6h >160           Code status is   Code Status and Medical Interventions:   Ordered at: 01/09/22 1508     Level Of Support Discussed With:    Next of Kin (If No Surrogate)     Code Status (Patient has no pulse and is not breathing):    CPR (Attempt to Resuscitate)     Medical Interventions  (Patient has pulse or is breathing):    Full Support     Comments:    Discussed with  Huy Slater at bedside       Prognosis: fair  Plan for disposition:Where: home and When:  3-4days    Time: Critical care 25 min        This document has been electronically signed by Luz Quijano MD on January 10, 2022 11:20 CST

## 2022-01-10 NOTE — ACP (ADVANCE CARE PLANNING)
Family Medicine Residency  Luz Quijano MD         Patient was not able to communicate her code status at time of admission as she was AMS. Her  who is in the room states she would have liked to be full code and was full code on last hospitalization.   Huy Slater is designated as health care proxy who can be contacted at [phone] to make medical decisions on her behalf if She is incapable of doing so.         This document has been electronically signed by Luz Quijano MD on January 10, 2022 11:11 CST

## 2022-01-10 NOTE — NURSING NOTE
Pt removed from Bipap et placed on 2L N/C at this time. Pt mental status assessed et pt remains as she was upon admission-nonverbal et confused, at this time. Pt will nod her head yes or no but does not always seem purposeful. Pt will not follow commands. VSS. Will continue to monitor.

## 2022-01-10 NOTE — PAYOR COMM NOTE
"  Emilie Adams  Case Management Extender  [phone] Phone  [phone] Fax    REF#[id number]    Yamileth Slater (61 y.o. 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                 Active Insurance as of 1/9/2022     Primary Coverage     Payor Plan Insurance Group Employer/Plan Group    ANTHEM MEDICARE REPLACEMENT ANTHEM MEDICARE ADVANTAGE [id number]     Payor Plan Address Payor Plan Phone Number Payor Plan Fax Number Effective Dates    [address] [phone]  1/1/2022 - None Entered    [facility] [address]       Subscriber Name Subscriber Birth Date Member ID       YAMILETH SLATER 1960 [id number]           Secondary Coverage     Payor Plan Insurance Group Employer/Plan Group    [place]      Payor Plan Address Payor Plan Phone Number Payor Plan Fax Number Effective Dates    [address] [phone]  6/28/2019 - None Entered    [facility] [id number]       Subscriber Name Subscriber Birth Date Member ID       YAMILETH SLATER 1960 [id number]                 Emergency Contacts      (Rel.) Home " Phone Work Phone Mobile Phone    Yamileth Chavez (Daughter) [phone] -- [phone]    Suzanne Rivera (Daughter) [phone] -- [phone]    Huy Slater (Spouse) [phone] -- [phone]              Lab Results (last 24 hours)     Procedure Component Value Units Date/Time    Phosphorus [[id number]] Collected: 01/10/22 1214    Specimen: Blood Updated: 01/10/22 1215    Basic Metabolic Panel [[id number]] Collected: 01/10/22 1214    Specimen: Blood Updated: 01/10/22 1215    Hepatitis B Surface Antigen [[id number]]  (Normal) Collected: 01/09/22 1214    Specimen: Blood Updated: 01/10/22 1148     Hepatitis B Surface Ag Non-Reactive    POC Glucose Once [[id number]]  (Abnormal) Collected: 01/09/22 2218    Specimen: Blood Updated: 01/10/22 1104     Glucose 441 mg/dL      Comment: : [id number] MAG HEATHERMeter ID: [id number]       POC Glucose Once [[id number]]  (Abnormal) Collected: 01/09/22 2121    Specimen: Blood Updated: 01/10/22 1104     Glucose 505 mg/dL      Comment: : [id number] MAG HEATHERMeter ID: [id number]       POC Glucose Once [[id number]]  (Abnormal) Collected: 01/09/22 2016    Specimen: Blood Updated: 01/10/22 1103     Glucose 571 mg/dL      Comment: : [id number] MAG HEATHERMeter ID: [id number]       Troponin [[id number]]  (Abnormal) Collected: 01/10/22 0820    Specimen: Blood Updated: 01/10/22 0852     Troponin T 0.626 ng/mL     Narrative:      Troponin T Reference Range:  <= 0.03 ng/mL-   Negative for AMI  >0.03 ng/mL-     Abnormal for myocardial necrosis.  Clinicians would have to utilize clinical acumen, EKG, Troponin and serial changes to determine if it is an Acute Myocardial Infarction or myocardial injury due to an underlying chronic condition.       Results may be falsely decreased if patient taking Biotin.      Basic Metabolic Panel [[id number]]  (Abnormal) Collected: 01/10/22 0820    Specimen: Blood Updated: 01/10/22 0849     Glucose 136 mg/dL      BUN 51  mg/dL      Creatinine 3.90 mg/dL      Sodium 138 mmol/L      Potassium 2.8 mmol/L      Chloride 103 mmol/L      CO2 23.0 mmol/L      Calcium 8.8 mg/dL      eGFR   Amer --     Comment: <15 Indicative of kidney failure.        eGFR Non African Amer 12 mL/min/1.73      Comment: <15 Indicative of kidney failure.        BUN/Creatinine Ratio 13.1     Anion Gap 12.0 mmol/L     Narrative:      GFR Normal >60  Chronic Kidney Disease <60  Kidney Failure <15      Phosphorus [[id number]]  (Normal) Collected: 01/10/22 0820    Specimen: Blood Updated: 01/10/22 0849     Phosphorus 3.2 mg/dL     POC Glucose Once [[id number]]  (Normal) Collected: 01/10/22 0821    Specimen: Blood Updated: 01/10/22 0833     Glucose 127 mg/dL      Comment: : [id number] [facility] AUDREYMeter ID: [id number]       POC Glucose Once [[id number]]  (Abnormal) Collected: 01/10/22 0524    Specimen: Blood Updated: 01/10/22 0540     Glucose 193 mg/dL      Comment: : [id number] [employer] HEATHERMeter ID: [id number]       POC Glucose Once [[id number]]  (Abnormal) Collected: 01/10/22 0420    Specimen: Blood Updated: 01/10/22 0540     Glucose 204 mg/dL      Comment: : [id number] MAG HEATHERMeter ID: [id number]       Troponin [[id number]]  (Abnormal) Collected: 01/10/22 0419    Specimen: Blood Updated: 01/10/22 0538     Troponin T 0.629 ng/mL     Narrative:      Troponin T Reference Range:  <= 0.03 ng/mL-   Negative for AMI  >0.03 ng/mL-     Abnormal for myocardial necrosis.  Clinicians would have to utilize clinical acumen, EKG, Troponin and serial changes to determine if it is an Acute Myocardial Infarction or myocardial injury due to an underlying chronic condition.       Results may be falsely decreased if patient taking Biotin.      Basic Metabolic Panel [[id number]]  (Abnormal) Collected: 01/10/22 0419    Specimen: Blood Updated: 01/10/22 0525     Glucose 203 mg/dL      BUN 52 mg/dL      Creatinine 4.01 mg/dL      Sodium 141 mmol/L       Potassium 3.1 mmol/L      Chloride 105 mmol/L      CO2 22.0 mmol/L      Calcium 8.9 mg/dL      eGFR   Amer --     Comment: <15 Indicative of kidney failure.        eGFR Non African Amer 11 mL/min/1.73      Comment: <15 Indicative of kidney failure.        BUN/Creatinine Ratio 13.0     Anion Gap 14.0 mmol/L     Narrative:      GFR Normal >60  Chronic Kidney Disease <60  Kidney Failure <15      Phosphorus [[id number]]  (Normal) Collected: 01/10/22 0419    Specimen: Blood Updated: 01/10/22 0525     Phosphorus 3.0 mg/dL     CBC (No Diff) [[id number]]  (Abnormal) Collected: 01/10/22 0419    Specimen: Blood Updated: 01/10/22 0459     WBC 11.34 10*3/mm3      RBC 2.95 10*6/mm3      Hemoglobin 8.5 g/dL      Hematocrit 25.6 %      MCV 86.8 fL      MCH 28.8 pg      MCHC 33.2 g/dL      RDW 16.0 %      RDW-SD 50.7 fl      MPV 8.9 fL      Platelets 259 10*3/mm3     POC Glucose Once [[id number]]  (Abnormal) Collected: 01/10/22 0325    Specimen: Blood Updated: 01/10/22 0350     Glucose 204 mg/dL      Comment: : [id number] [employer] HEATHERMeter ID: [id number]       POC Glucose Once [[id number]]  (Abnormal) Collected: 01/10/22 0236    Specimen: Blood Updated: 01/10/22 0350     Glucose 214 mg/dL      Comment: : [id number] MAG HEATHERMeter ID: [id number]       POC Glucose Once [[id number]]  (Abnormal) Collected: 01/10/22 0122    Specimen: Blood Updated: 01/10/22 0350     Glucose 240 mg/dL      Comment: : [id number] MAG HEATHERMeter ID: [id number]       POC Glucose Once [[id number]]  (Abnormal) Collected: 01/10/22 0018    Specimen: Blood Updated: 01/10/22 0350     Glucose 282 mg/dL      Comment: : [id number] [employer] HEATHERMeter ID: [id number]       POC Glucose Once [[id number]]  (Abnormal) Collected: 01/09/22 2307    Specimen: Blood Updated: 01/10/22 0349     Glucose 376 mg/dL      Comment: : [phone] [facility] [id number]       Basic Metabolic Panel  [[id number]]  (Abnormal) Collected: 01/09/22 2357    Specimen: Blood Updated: 01/10/22 0047     Glucose 311 mg/dL      BUN 49 mg/dL      Creatinine 3.93 mg/dL      Sodium 139 mmol/L      Potassium 2.9 mmol/L      Chloride 101 mmol/L      CO2 21.0 mmol/L      Calcium 8.9 mg/dL      eGFR   Amer --     Comment: <15 Indicative of kidney failure.        eGFR Non African Amer 12 mL/min/1.73      Comment: <15 Indicative of kidney failure.        BUN/Creatinine Ratio 12.5     Anion Gap 17.0 mmol/L     Narrative:      GFR Normal >60  Chronic Kidney Disease <60  Kidney Failure <15      Phosphorus [[id number]]  (Normal) Collected: 01/09/22 2357    Specimen: Blood Updated: 01/10/22 0047     Phosphorus 2.7 mg/dL     Troponin [[id number]]  (Abnormal) Collected: 01/09/22 2357    Specimen: Blood Updated: 01/10/22 0047     Troponin T 0.673 ng/mL     Narrative:      Troponin T Reference Range:  <= 0.03 ng/mL-   Negative for AMI  >0.03 ng/mL-     Abnormal for myocardial necrosis.  Clinicians would have to utilize clinical acumen, EKG, Troponin and serial changes to determine if it is an Acute Myocardial Infarction or myocardial injury due to an underlying chronic condition.       Results may be falsely decreased if patient taking Biotin.      Basic Metabolic Panel [[id number]]  (Abnormal) Collected: 01/09/22 1618    Specimen: Blood Updated: 01/09/22 2248     Glucose 865 mg/dL      BUN 46 mg/dL      Creatinine 3.79 mg/dL      Sodium 132 mmol/L      Potassium 3.8 mmol/L      Chloride 89 mmol/L      CO2 17.0 mmol/L      Calcium 9.2 mg/dL      eGFR   Amer --     Comment: <15 Indicative of kidney failure.        eGFR Non African Amer 12 mL/min/1.73      Comment: <15 Indicative of kidney failure.        BUN/Creatinine Ratio 12.1     Anion Gap 26.0 mmol/L     Narrative:      GFR Normal >60  Chronic Kidney Disease <60  Kidney Failure <15      Troponin [[id number]]  (Abnormal) Collected: 01/09/22 1828    Specimen: Blood Updated:  01/09/22 1946     Troponin T 0.451 ng/mL     Narrative:      Troponin T Reference Range:  <= 0.03 ng/mL-   Negative for AMI  >0.03 ng/mL-     Abnormal for myocardial necrosis.  Clinicians would have to utilize clinical acumen, EKG, Troponin and serial changes to determine if it is an Acute Myocardial Infarction or myocardial injury due to an underlying chronic condition.       Results may be falsely decreased if patient taking Biotin.      Phosphorus [[id number]]  (Normal) Collected: 01/09/22 1828    Specimen: Blood Updated: 01/09/22 1946     Phosphorus 3.2 mg/dL     Basic Metabolic Panel [[id number]]  (Abnormal) Collected: 01/09/22 1828    Specimen: Blood Updated: 01/09/22 1945     Glucose 721 mg/dL      BUN 47 mg/dL      Creatinine 3.87 mg/dL      Sodium 133 mmol/L      Potassium 3.4 mmol/L      Chloride 94 mmol/L      CO2 18.0 mmol/L      Calcium 8.7 mg/dL      eGFR   Amer --     Comment: <15 Indicative of kidney failure.        eGFR Non African Amer 12 mL/min/1.73      Comment: <15 Indicative of kidney failure.        BUN/Creatinine Ratio 12.1     Anion Gap 21.0 mmol/L     Narrative:      GFR Normal >60  Chronic Kidney Disease <60  Kidney Failure <15      Glucose, Random [[id number]]  (Abnormal) Collected: 01/09/22 1828    Specimen: Blood Updated: 01/09/22 1921     Glucose 711 mg/dL     Extra Tubes [[id number]] Collected: 01/09/22 1618    Specimen: Blood, Venous Line Updated: 01/09/22 1730    Narrative:      The following orders were created for panel order Extra Tubes.  Procedure                               Abnormality         Status                     ---------                               -----------         ------                     Green Top (Gel)[[id number]]                                  Final result               Green Top (Gel)[[id number]]                                  Final result                 Please view results for these tests on the individual orders.    Green Top (Gel) [[id number]]  Collected: 01/09/22 1618    Specimen: Blood Updated: 01/09/22 1730     Extra Tube Hold for add-ons.     Comment: Auto resulted.       Green Top (Gel) [[id number]] Collected: 01/09/22 1618    Specimen: Blood Updated: 01/09/22 1730     Extra Tube Hold for add-ons.     Comment: Auto resulted.       Extra Tubes [[id number]] Collected: 01/09/22 1627    Specimen: Blood, Venous Line Updated: 01/09/22 1730    Narrative:      The following orders were created for panel order Extra Tubes.  Procedure                               Abnormality         Status                     ---------                               -----------         ------                     Lavender Top[[id number]]                                     Final result                 Please view results for these tests on the individual orders.    Lavender Top [[id number]] Collected: 01/09/22 1627    Specimen: Blood Updated: 01/09/22 1730     Extra Tube hold for add-on     Comment: Auto resulted       Basic Metabolic Panel [[id number]]  (Abnormal) Collected: 01/09/22 1618    Specimen: Blood Updated: 01/09/22 1709     Glucose 783 mg/dL      BUN 47 mg/dL      Creatinine 3.43 mg/dL      Sodium 133 mmol/L      Potassium 3.9 mmol/L      Chloride 90 mmol/L      CO2 18.0 mmol/L      Calcium 8.9 mg/dL      eGFR   Amer --     Comment: <15 Indicative of kidney failure.        eGFR Non African Amer 14 mL/min/1.73      Comment: <15 Indicative of kidney failure.        BUN/Creatinine Ratio 13.7     Anion Gap 25.0 mmol/L     Narrative:      GFR Normal >60  Chronic Kidney Disease <60  Kidney Failure <15      Troponin [[id number]]  (Abnormal) Collected: 01/09/22 1618    Specimen: Blood Updated: 01/09/22 1709     Troponin T 0.387 ng/mL     Narrative:      Troponin T Reference Range:  <= 0.03 ng/mL-   Negative for AMI  >0.03 ng/mL-     Abnormal for myocardial necrosis.  Clinicians would have to utilize clinical acumen, EKG, Troponin and serial changes to determine if it is an  "Acute Myocardial Infarction or myocardial injury due to an underlying chronic condition.       Results may be falsely decreased if patient taking Biotin.      STAT Lactic Acid, Reflex [[id number]]  (Abnormal) Collected: 01/09/22 1618    Specimen: Blood Updated: 01/09/22 1707     Lactate 4.8 mmol/L     Procalcitonin [[id number]]  (Abnormal) Collected: 01/09/22 1618    Specimen: Blood Updated: 01/09/22 1701     Procalcitonin 0.29 ng/mL     Narrative:      As a Marker for Sepsis (Non-Neonates):     1. <0.5 ng/mL represents a low risk of severe sepsis and/or septic shock.  2. >2 ng/mL represents a high risk of severe sepsis and/or septic shock.    As a Marker for Lower Respiratory Tract Infections that require antibiotic therapy:  PCT on Admission     Antibiotic Therapy             6-12 Hrs later  >0.5                          Strongly Recommended            >0.25 - <0.5             Recommended  0.1 - 0.25                  Discouraged                       Remeasure/reassess PCT  <0.1                         Strongly Discouraged         Remeasure/reassess PCT      As 28 day mortality risk marker: \"Change in Procalcitonin Result\" (>80% or <=80%) if Day 0 (or Day 1) and Day 4 values are available. Refer to http://www.MXP4s-pct-calculator.com/    Change in PCT <=80 %   A decrease of PCT levels below or equal to 80% defines a positive change in PCT test result representing a higher risk for 28-day all-cause mortality of patients diagnosed with severe sepsis or septic shock.    Change in PCT >80 %   A decrease of PCT levels of more than 80% defines a negative change in PCT result representing a lower risk for 28-day all-cause mortality of patients diagnosed with severe sepsis or septic shock.                Phosphorus [[id number]]  (Abnormal) Collected: 01/09/22 1618    Specimen: Blood Updated: 01/09/22 1653     Phosphorus 5.1 mg/dL     Blood Culture - Blood, Arm, Left [[id number]] Collected: 01/09/22 1618    Specimen: Blood " from Arm, Left Updated: 01/09/22 1626    Blood Culture - Blood, Hand, Left [[id number]] Collected: 01/09/22 1619    Specimen: Blood from Hand, Left Updated: 01/09/22 1626    Osmolality, Serum [[id number]]  (Abnormal) Collected: 01/09/22 1214    Specimen: Blood Updated: 01/09/22 1541     Osmolality 341 mOsm/kg     Phosphorus [[id number]]  (Abnormal) Collected: 01/09/22 1214    Specimen: Blood Updated: 01/09/22 1527     Phosphorus 6.1 mg/dL     Urinalysis, Microscopic Only - Urine, Catheter [[id number]]  (Abnormal) Collected: 01/09/22 1450    Specimen: Urine, Catheter Updated: 01/09/22 1519     RBC, UA 0-2 /HPF      WBC, UA 0-2 /HPF      Bacteria, UA Trace /HPF      Squamous Epithelial Cells, UA 0-2 /HPF      Yeast, UA Small/1+ Yeast /HPF      Hyaline Casts, UA None Seen /LPF      Methodology Manual Light Microscopy    Urine Drug Screen - Urine, Clean Catch [[id number]]  (Normal) Collected: 01/09/22 1450    Specimen: Urine, Clean Catch Updated: 01/09/22 1505     THC, Screen, Urine Negative     Phencyclidine (PCP), Urine Negative     Cocaine Screen, Urine Negative     Methamphetamine, Ur Negative     Opiate Screen Negative     Amphetamine Screen, Urine Negative     Benzodiazepine Screen, Urine Negative     Tricyclic Antidepressants Screen Negative     Methadone Screen, Urine Negative     Barbiturates Screen, Urine Negative     Oxycodone Screen, Urine Negative     Propoxyphene Screen Negative     Buprenorphine, Screen, Urine Negative    Narrative:      Cutoff For Drugs Screened:    Amphetamines               500 ng/ml  Barbiturates               200 ng/ml  Benzodiazepines            150 ng/ml  Cocaine                    150 ng/ml  Methadone                  200 ng/ml  Opiates                    100 ng/ml  Phencyclidine               25 ng/ml  THC                            50 ng/ml  Methamphetamine            500 ng/ml  Tricyclic Antidepressants  300 ng/ml  Oxycodone                  100 ng/ml  Propoxyphene               300  ng/ml  Buprenorphine               10 ng/ml    The normal value for all drugs tested is negative. This report includes unconfirmed screening results, with the cutoff values listed, to be used for medical treatment purposes only.  Unconfirmed results must not be used for non-medical purposes such as employment or legal testing.  Clinical consideration should be applied to any drug of abuse test, particularly when unconfirmed results are used.      Urinalysis With Culture If Indicated - Urine, Catheter [[id number]]  (Abnormal) Collected: 01/09/22 1450    Specimen: Urine, Catheter Updated: 01/09/22 1500     Color, UA Yellow     Appearance, UA Clear     pH, UA 7.0     Specific Gravity, UA 1.022     Glucose, UA >=1000 mg/dL (3+)     Ketones, UA 15 mg/dL (1+)     Bilirubin, UA Negative     Blood, UA Small (1+)     Protein, UA >=300 mg/dL (3+)     Leuk Esterase, UA Negative     Nitrite, UA Negative     Urobilinogen, UA 0.2 E.U./dL    COVID-19 and FLU A/B PCR - Swab, Nasopharynx [[id number]]  (Normal) Collected: 01/09/22 1413    Specimen: Swab from Nasopharynx Updated: 01/09/22 1437     COVID19 Not Detected     Influenza A PCR Not Detected     Influenza B PCR Not Detected    Narrative:      Fact sheet for providers: https://www.fda.gov/media/016613/download    Fact sheet for patients: https://www.fda.gov/media/505702/download    Test performed by PCR.    Acetone [[id number]]  (Abnormal) Collected: 01/09/22 1214    Specimen: Blood Updated: 01/09/22 1348     Acetone Moderate    Lactic Acid, Plasma [[id number]]  (Abnormal) Collected: 01/09/22 1214    Specimen: Blood Updated: 01/09/22 1321     Lactate 2.5 mmol/L     Extra Tubes [[id number]] Collected: 01/09/22 1214    Specimen: Blood Updated: 01/09/22 1315    Narrative:      The following orders were created for panel order Extra Tubes.  Procedure                               Abnormality         Status                     ---------                               -----------          ------                     Gold Top - [facility][[id number]]                                   Final result                 Please view results for these tests on the individual orders.    Gold Top - [facility] [[id number]] Collected: 01/09/22 1214    Specimen: Blood Updated: 01/09/22 1315     Extra Tube Hold for add-ons.     Comment: Auto resulted.       Comprehensive Metabolic Panel [[id number]]  (Abnormal) Collected: 01/09/22 1214    Specimen: Blood Updated: 01/09/22 1305     Glucose 800 mg/dL      BUN 44 mg/dL      Creatinine 3.46 mg/dL      Sodium 129 mmol/L      Potassium 4.0 mmol/L      Chloride 87 mmol/L      CO2 18.0 mmol/L      Calcium 9.0 mg/dL      Total Protein 8.0 g/dL      Albumin 3.80 g/dL      ALT (SGPT) 7 U/L      AST (SGOT) 10 U/L      Alkaline Phosphatase 192 U/L      Total Bilirubin 0.2 mg/dL      eGFR Non African Amer 13 mL/min/1.73      Comment: <15 Indicative of kidney failure.        eGFR   Amer --     Comment: <15 Indicative of kidney failure.        Globulin 4.2 gm/dL      A/G Ratio 0.9 g/dL      BUN/Creatinine Ratio 12.7     Anion Gap 24.0 mmol/L     Narrative:      GFR Normal >60  Chronic Kidney Disease <60  Kidney Failure <15      Troponin [[id number]]  (Abnormal) Collected: 01/09/22 1214    Specimen: Blood Updated: 01/09/22 1257     Troponin T 0.268 ng/mL     Narrative:      Troponin T Reference Range:  <= 0.03 ng/mL-   Negative for AMI  >0.03 ng/mL-     Abnormal for myocardial necrosis.  Clinicians would have to utilize clinical acumen, EKG, Troponin and serial changes to determine if it is an Acute Myocardial Infarction or myocardial injury due to an underlying chronic condition.       Results may be falsely decreased if patient taking Biotin.      aPTT [[id number]]  (Normal) Collected: 01/09/22 1214    Specimen: Blood Updated: 01/09/22 1252     PTT 23.2 seconds     Narrative:      The recommended Heparin therapeutic range is 68-97 seconds.    Protime-INR [[id number]]  (Normal) Collected:  01/09/22 1214    Specimen: Blood Updated: 01/09/22 1252     Protime 14.1 Seconds      INR 1.10    Narrative:      Therapeutic range for most indications is 2.0-3.0 INR,  or 2.5-3.5 for mechanical heart valves.    Lipase [[id number]]  (Normal) Collected: 01/09/22 1214    Specimen: Blood Updated: 01/09/22 1251     Lipase 54 U/L     Ethanol [[id number]] Collected: 01/09/22 1214    Specimen: Blood Updated: 01/09/22 1251     Ethanol <10 mg/dL      Ethanol % <0.010 %     Magnesium [[id number]]  (Normal) Collected: 01/09/22 1214    Specimen: Blood Updated: 01/09/22 1251     Magnesium 1.9 mg/dL     BNP [[id number]]  (Abnormal) Collected: 01/09/22 1214    Specimen: Blood Updated: 01/09/22 1249     proBNP 18,229.0 pg/mL     Narrative:      Among patients with dyspnea, NT-proBNP is highly sensitive for the detection of acute congestive heart failure. In addition NT-proBNP of <300 pg/ml effectively rules out acute congestive heart failure with 99% negative predictive value.    Results may be falsely decreased if patient taking Biotin.      CBC & Differential [[id number]]  (Abnormal) Collected: 01/09/22 1214    Specimen: Blood Updated: 01/09/22 1230    Narrative:      The following orders were created for panel order CBC & Differential.  Procedure                               Abnormality         Status                     ---------                               -----------         ------                     CBC Auto Differential[[id number]]        Abnormal            Final result                 Please view results for these tests on the individual orders.    CBC Auto Differential [[id number]]  (Abnormal) Collected: 01/09/22 1214    Specimen: Blood Updated: 01/09/22 1230     WBC 17.78 10*3/mm3      RBC 2.44 10*6/mm3      Hemoglobin 7.0 g/dL      Hematocrit 22.1 %      MCV 90.6 fL      MCH 28.7 pg      MCHC 31.7 g/dL      RDW 16.0 %      RDW-SD 53.4 fl      MPV 9.1 fL      Platelets 423 10*3/mm3      Neutrophil % 86.4 %      Lymphocyte  % 8.1 %      Monocyte % 3.5 %      Eosinophil % 0.0 %      Basophil % 0.4 %      Immature Grans % 1.6 %      Neutrophils, Absolute 15.36 10*3/mm3      Lymphocytes, Absolute 1.44 10*3/mm3      Monocytes, Absolute 0.62 10*3/mm3      Eosinophils, Absolute 0.00 10*3/mm3      Basophils, Absolute 0.08 10*3/mm3      Immature Grans, Absolute 0.28 10*3/mm3      nRBC 0.0 /100 WBC         Imaging Results (Last 24 Hours)     Procedure Component Value Units Date/Time    US Guidance PICC NC [[id number]] Resulted: 01/09/22 1251     Updated: 01/09/22 1251    Narrative:      This procedure was auto-finalized with no dictation required.    XR Chest Post CVA Port [[id number]] Collected: 01/09/22 1220     Updated: 01/09/22 1236    Narrative:        PORTABLE CHEST    HISTORY: PICC line placement    Portable AP upright film of the chest was obtained at 12:05 PM.  COMPARISON: 10:36 AM    FINDINGS:   Right PICC line now in place with tip projected over the proximal  superior vena cava.  Right internal jugular multilumen catheter remains in place with  tip projected over the proximal superior vena cava.  Sternotomy.  Cardiomegaly with minimal pulmonary vascular congestion and  interstitial edema.  Perhaps very small right pleural effusion.  Old granulomatous disease.  No pneumothorax.  No acute osseous abnormality.  Degenerative changes are present in the thoracic spine.      Impression:      CONCLUSION:  Right PICC line now in place with tip projected over the proximal  superior vena cava.  Right internal jugular multilumen catheter remains in place with  tip projected over the proximal superior vena cava.  Sternotomy.  Cardiomegaly with minimal pulmonary vascular congestion and  interstitial edema.  Perhaps very small right pleural effusion.    [id number]    Electronically signed by:  Jon Patricia MD  1/9/2022 12:35 PM CST  Workstation: [employer] PICC W Imaging Guidance [[id number]] Resulted: 01/09/22 1227     Updated: 01/09/22 1227     Narrative:      This procedure was auto-finalized with no dictation required.        ECG/EMG Results (last 24 hours)     Procedure Component Value Units Date/Time    ECG 12 Lead [[id number]]  (Abnormal) Collected: 01/09/22 1028     Updated: 01/09/22 1318    ECG 12 Lead [[id number]] Collected: 01/09/22 1833     Updated: 01/10/22 0734     QT Interval 438 ms      QTC Interval 541 ms     Narrative:      Test Reason : Elavated troponin  Blood Pressure :   */*   mmHG  Vent. Rate :  92 BPM     Atrial Rate :  92 BPM     P-R Int : 104 ms          QRS Dur : 166 ms      QT Int : 438 ms       P-R-T Axes :  10 270  58 degrees     QTc Int : 541 ms    Sinus rhythm with short NM  Right bundle branch block  Abnormal ECG  When compared with ECG of 09-JAN-2022 10:28,  Nonspecific T wave abnormality now evident in Inferior leads  T wave inversion more evident in Anterolateral leads    Referred By:            Confirmed By:

## 2022-01-10 NOTE — CONSULTS
[facility] Consult    Referring Provider: Bladimir Morillo MD      Reason for Consultation:  elevated troponin    Patient Care Team:  Rianna Macias MD as PCP - General (Family Medicine)    Chief complaint   Chief Complaint   Patient presents with    Altered Mental Status       Subjective .     History of present illness:     Mr. Slater is a 61 year old patient who presents to [facility] with hyperglycemia and altered mental status.    She was diagnosed with HHS and started on insulin and fluids.   She has an elevated troponin that is flat, and peaked.     She is unable to give interval history due to her mental state.     Last [facility] was November 2021 and she had LM stent by Dr. Rios on a weekend. She normally follows with Dr. Handy. She has had high risk PCI stenting in the past with Dr. Voss at [place] due to not being a re do CABG candidate.         Review of Systems  Review of Systems   Unable to perform ROS: Mental status change       History  Past Medical History:   Diagnosis Date    Acute blood loss anemia 4/16/2017    Likely due to gastric oozing at this time. - Dr. Duarte (GI) was consulted and has now signed off, will follow up outpatient - pill colonoscopy showed AVMs - continue to monitor    Anxiety     CAD (coronary artery disease) 4/24/2021    S/P 3 stents 5/1/2021 for [facility] Continue ASA 81mg & Clopidogrel 75mg Continue Atorvastatin 40mg    Carotid artery stenosis     Chronic obstructive lung disease (HCC)     CKD (chronic kidney disease) stage 4, GFR 15-29 ml/min (HCC)     Colonic polyp     Coronary arteriosclerosis     Diabetes mellitus (HCC)     Diabetic neuropathy (HCC)     Ear pain, right 10/18/2021    - canal trauma due to patient scratching and DMT2 - added cortisporin ear drops    Elevated troponin 10/12/2021    -most likely from CKD -Trending down -Neg chest pain    GERD (gastroesophageal reflux disease)     GI bleed 5/13/2021    - GI will follow up outpatient  - Protonix 40mg daily - Avoid medical DVT prophy and use mechanical at this time instead. - Continue to monitor - pill colonoscopy results showed AVMs    History of transfusion     Hypercholesterolemia     Hypertension     Hypomagnesemia 6/27/2021    Monitor and replace    Morbid obesity (HCC)     Nephrolithiasis     Peripheral vascular disease (HCC)     Sleep apnea     Substance abuse (HCC)     Vitamin D deficiency    ,   Past Surgical History:   Procedure Laterality Date    CARDIAC CATHETERIZATION N/A 7/14/2020    CARDIAC CATHETERIZATION N/A 4/23/2021    Procedure: Left Heart Cath;  Surgeon: Melba Romo MD;  Location: [facility] CATH INVASIVE LOCATION;  Service: Cardiology;  Laterality: N/A;    CARDIAC CATHETERIZATION N/A 4/30/2021    Procedure: Percutaneous Coronary Intervention;  Surgeon: Russell Voss MD;  Location: [facility] CATH INVASIVE LOCATION;  Service: Cardiovascular;  Laterality: N/A;    CARDIAC CATHETERIZATION N/A 4/30/2021    Procedure: Stent NIKKI coronary;  Surgeon: Russell Voss MD;  Location: [facility] CATH INVASIVE LOCATION;  Service: Cardiovascular;  Laterality: N/A;    CARDIAC CATHETERIZATION Left 11/13/2021    Procedure: Left Heart Cath;  Surgeon: Niall Rios MD;  Location: [facility] CATH INVASIVE LOCATION;  Service: Cardiology;  Laterality: Left;    CAROTID STENT Left     COLONOSCOPY      COLONOSCOPY N/A 5/14/2021    Procedure: COLONOSCOPY;  Surgeon: Mingo Duarte MD;  Location: [facility] ENDOSCOPY;  Service: Gastroenterology;  Laterality: N/A;    CORONARY ARTERY BYPASS GRAFT N/A 2013    CABG X 3    CYSTOSCOPY BLADDER STONE LITHOTRIPSY Bilateral     ENDOSCOPY N/A 4/12/2021    Procedure: ESOPHAGOGASTRODUODENOSCOPY;  Surgeon: Mingo Duarte MD;  Location: [facility] ENDOSCOPY;  Service: Gastroenterology;  Laterality: N/A;    ENDOSCOPY N/A 5/14/2021    Procedure: ESOPHAGOGASTRODUODENOSCOPY;  Surgeon: Mingo Duarte MD;  Location: [facility] ENDOSCOPY;  Service:  Gastroenterology;  Laterality: N/A;    INTERVENTIONAL RADIOLOGY PROCEDURE N/A 10/21/2021    Procedure: tunneled central venous catheter placement;  Surgeon: Donnie Robles MD;  Location: [facility] ANGIO INVASIVE LOCATION;  Service: Interventional Radiology;  Laterality: N/A;   ,   Family History   Problem Relation Age of Onset    Heart disease Mother     Lung cancer Mother     Heart disease Father     Heart attack Father     Diabetes Father     Heart disease Half-Sister         Dad's side    Heart disease Brother     No Known Problems Sister     No Known Problems Sister     No Known Problems Sister     No Known Problems Sister     No Known Problems Sister     Pancreatic cancer Half-Sister         Dad's side    No Known Problems Brother     No Known Problems Brother     Heart attack Half-Brother     Heart attack Half-Brother     No Known Problems Maternal Grandmother     No Known Problems Maternal Grandfather     No Known Problems Paternal Grandmother     No Known Problems Paternal Grandfather    ,   Social History     Tobacco Use    Smoking status: Former Smoker     Packs/day: 0.25     Years: 46.00     Pack years: 11.50     Types: Cigarettes    Smokeless tobacco: Never Used    Tobacco comment: only smoking 5 a day - quit april 23 2021   Substance Use Topics    Alcohol use: No    Drug use: Not Currently     Types: LSD, Marijuana, Methamphetamines   ,   Medications Prior to Admission   Medication Sig Dispense Refill Last Dose    acetaminophen (Tylenol 8 Hour) 650 MG 8 hr tablet Take 1 tablet by mouth Every 8 (Eight) Hours As Needed for Mild Pain . 90 tablet 0 1/8/2022 at Unknown time    albuterol (PROVENTIL) (2.5 MG/3ML) 0.083% nebulizer solution Inhale the contents of 1 vial by nebulization Every 4 (Four) Hours As Needed for Wheezing. 75 mL 3 Past Week at Unknown time    albuterol sulfate  (90 Base) MCG/ACT inhaler Inhale 2 puffs Every 4 (Four) Hours As Needed for Wheezing. 18 g 1 Past Week at Unknown  time    aspirin (aspirin) 81 MG EC tablet Take 1 tablet by mouth Daily. 60 tablet 5 1/8/2022 at Unknown time    atorvastatin (LIPITOR) 20 MG tablet TAKE ONE TABLET BY MOUTH EVERY NIGHT AT BEDTIME 30 tablet 1 1/8/2022 at Unknown time    bumetanide (BUMEX) 1 MG tablet Take 1 mg by mouth 2 (Two) Times a Day.       clopidogrel (PLAVIX) 75 MG tablet Take 1 tablet by mouth Daily. 30 tablet 5 1/8/2022 at Unknown time    cyclobenzaprine (FLEXERIL) 10 MG tablet Take 1 tablet by mouth 2 (Two) Times a Day As Needed for Muscle Spasms. 60 tablet 5 1/8/2022 at Unknown time    fluticasone (FLONASE) 50 MCG/ACT nasal spray 2 sprays by Each Nare route Daily. 9.9 mL 0 Past Week at Unknown time    gabapentin (NEURONTIN) 800 MG tablet Take 1 tablet by mouth 3 (Three) Times a Day. 90 tablet 0 1/8/2022 at Unknown time    glucose blood test strip Use as instructed 100 each 12 1/8/2022 at Unknown time    insulin aspart (novoLOG FLEXPEN) 100 UNIT/ML solution pen-injector sc pen Inject 30 Units under the skin into the appropriate area as directed 3 (Three) Times a Day With Meals. 30 mL 12 1/8/2022 at Unknown time    insulin detemir (LEVEMIR) 100 UNIT/ML injection Inject 60 Units under the skin into the appropriate area as directed Daily. 2 mL 12 1/8/2022 at Unknown time    ipratropium-albuterol (DUO-NEB) 0.5-2.5 mg/3 ml nebulizer Take 3 mL by nebulization 4 (Four) Times a Day.   Past Week at Unknown time    lisinopril (PRINIVIL,ZESTRIL) 5 MG tablet TAKE 1 TABLET BY MOUTH DAILY. 30 tablet 0 1/8/2022 at Unknown time    montelukast (SINGULAIR) 10 MG tablet Take 1 tablet by mouth Every Night. 30 tablet 5 1/8/2022 at Unknown time    O2 (OXYGEN) Inhale 3 L/min Continuous.   1/9/2022 at Unknown time    oxybutynin XL (DITROPAN-XL) 5 MG 24 hr tablet Take 1 tablet by mouth Daily. 90 tablet 1 1/8/2022 at Unknown time    pantoprazole (PROTONIX) 40 MG EC tablet Take 1 tablet by mouth Every Night. 30 tablet 5 1/8/2022 at Unknown time    potassium chloride  10 MEQ CR tablet Take 10 mEq by mouth Daily.       Blood Glucose Monitoring Suppl ([employer] Blood Glucose Meter) w/Device kit 1 each 3 (Three) Times a Day. 1 kit 3 Unknown at Unknown time    cetirizine (zyrTEC) 10 MG tablet Take 1 tablet by mouth Daily. 30 tablet 3 Unknown at Unknown time    Continuous Blood Gluc  (FreeStyle Jade 2 [place]) device 1 each Continuous. 1 each 0 Unknown at Unknown time    Continuous Blood Gluc Sensor (FreeStyle Jade 2 Sensor) misc 1 each Every 14 (Fourteen) Days. 2 each 11 Unknown at Unknown time    EASY TOUCH PEN NEEDLES 31G X 8 MM misc    Unknown at Unknown time    epoetin hubert-epbx (RETACRIT) 61284 UNIT/ML injection Inject 1 mL under the skin into the appropriate area as directed 3 (Three) Times a Week. Indications: ESRD on Dialysis 6.6 mL 0 Unknown at Unknown time    ferrous sulfate (FeroSul) 325 (65 FE) MG tablet Take 1 tablet by mouth Daily With Breakfast. 30 tablet 3 Unknown at Unknown time    Insulin Pen Needle (NovoFine) 30G X 8 MM misc As directed 4 times daily 100 each 11 Unknown at Unknown time    Insulin Pen Needle 31G X 8 MM misc Use to inject insulin 4 (Four) Times a Day as directed. 120 each 12 Unknown at Unknown time    isosorbide mononitrate (IMDUR) 30 MG 24 hr tablet TAKE 1 TABLET BY MOUTH EVERY MORNING. 30 tablet 0 Unknown at Unknown time    levothyroxine (SYNTHROID, LEVOTHROID) 25 MCG tablet Take 25 mcg by mouth Daily.   Unknown at Unknown time    lidocaine (LIDODERM) 5 % APPLY TO THE AFFECTED AREA(S) TOPICALLY TWO TIMES A DAY. REMOVE NIGHTLY 30 patch 1 Unknown at Unknown time    metoprolol tartrate (LOPRESSOR) 50 MG tablet TAKE ONE TABLET BY MOUTH TWO TIMES A DAY. HOLD IF PULSE IS LESS THAN 60 60 tablet 1 Unknown at Unknown time    nitroglycerin (NITROSTAT) 0.4 MG SL tablet Place 0.4 mg under the tongue Every 5 (Five) Minutes As Needed for Chest Pain (x 3 doses).   More than a month at Unknown time    nystatin (MYCOSTATIN) 502282 UNIT/GM powder Apply  topically  "to the appropriate area as directed 3 (Three) Times a Day. 15 g 1 Unknown at Unknown time    ondansetron ODT (Zofran ODT) 4 MG disintegrating tablet Place 1 tablet on the tongue Every 8 (Eight) Hours As Needed for Nausea or Vomiting. 30 tablet 0 More than a month at Unknown time    promethazine (PHENERGAN) 25 MG tablet Take 25 mg by mouth Every 6 (Six) Hours As Needed.   More than a month at Unknown time    ranolazine (RANEXA) 500 MG 12 hr tablet TAKE 1 TABLET BY MOUTH EVERY 12 (TWELVE) HOURS. 60 tablet 0 Unknown at Unknown time    sennosides-docusate (PERICOLACE) 8.6-50 MG per tablet Take 2 tablets by mouth 2 (Two) Times a Day. 60 tablet 2 Unknown at Unknown time      ALLERGIES  Adhesive tape and Other    The following portions of the patient's history were reviewed and updated as appropriate: allergies, current medications, past family history, past medical history, past social history, past surgical history and problem list.     Old and outside records reviewed (if available) and pertinent information is included in the objective data.     Objective     Vital Sign Min/Max for last 24 hours  Temp  Min: 99 °F (37.2 °C)  Max: 101.2 °F (38.4 °C)   BP  Min: 93/52  Max: 191/85   Pulse  Min: 85  Max: 108   Resp  Min: 18  Max: 24   SpO2  Min: 97 %  Max: 100 %   Flow (L/min)  Min: 2  Max: 3   Weight  Min: 127 kg (279 lb 5.2 oz)  Max: 129 kg (283 lb 15.2 oz)     Flowsheet Rows        First Filed Value   Admission Height 167.6 cm (66\") Documented at 01/09/2022 1304   Admission Weight 132 kg (291 lb 9.6 oz) Documented at 01/09/2022 1048               Physical Exam:  Physical Exam  Vitals and nursing note reviewed.   Constitutional:       General: She is not in acute distress.     Appearance: She is well-developed. She is obese.   HENT:      Head: Normocephalic.   Eyes:      Conjunctiva/sclera: Conjunctivae normal.   Neck:      Vascular: No JVD.   Cardiovascular:      Rate and Rhythm: Normal rate and regular rhythm.      " Heart sounds: Normal heart sounds, S1 normal and S2 normal. No murmur heard.  No friction rub. No gallop.    Pulmonary:      Effort: Pulmonary effort is normal. No respiratory distress.      Breath sounds: Normal breath sounds. No wheezing or rales.   Abdominal:      General: Bowel sounds are normal. There is no distension.      Palpations: Abdomen is soft.   Musculoskeletal:         General: Normal range of motion.   Skin:     General: Skin is warm and dry.      Findings: No erythema.            Results Reviewed by myself:     Results from last 7 days   Lab Units 01/10/22  1214 01/10/22  0820 01/10/22  0419 01/09/22  1618 01/09/22  1214   SODIUM mmol/L 132* 138 141   < > 129*   POTASSIUM mmol/L 3.2* 2.8* 3.1*   < > 4.0   CHLORIDE mmol/L 97* 103 105   < > 87*   CO2 mmol/L 20.0* 23.0 22.0   < > 18.0*   BUN mg/dL 32* 51* 52*   < > 44*   CREATININE mg/dL 2.91* 3.90* 4.01*   < > 3.46*   CALCIUM mg/dL 8.1* 8.8 8.9   < > 9.0   BILIRUBIN mg/dL  --   --   --   --  0.2   ALK PHOS U/L  --   --   --   --  192*   ALT (SGPT) U/L  --   --   --   --  7   AST (SGOT) U/L  --   --   --   --  10   GLUCOSE mg/dL 255* 136* 203*   < > 800*    < > = values in this interval not displayed.       Estimated Creatinine Clearance: 27.3 mL/min (A) (by C-G formula based on SCr of 2.91 mg/dL (H)).    Results from last 7 days   Lab Units 01/10/22  1214 01/10/22  0820 01/10/22  0419 01/09/22  1618 01/09/22  1214   MAGNESIUM mg/dL  --   --   --   --  1.9   PHOSPHORUS mg/dL 2.6 3.2 3.0   < > 6.1*    < > = values in this interval not displayed.       Results from last 7 days   Lab Units 01/10/22  0419 01/09/22  1214   WBC 10*3/mm3 11.34* 17.78*   HEMOGLOBIN g/dL 8.5* 7.0*   PLATELETS 10*3/mm3 259 423       Results from last 7 days   Lab Units 01/09/22  1214   INR  1.10       Lab Results   Component Value Date    CKTOTAL 43 06/27/2021    CKMB 0.93 08/26/2017    TROPONINI 0.01 08/21/2021    TROPONINT 0.626 (C) 01/10/2022     Lab Results   Component Value  Date    PROBNP 18,229.0 (H) 01/09/2022       I/O last 3 completed shifts:  In: 3270 [I.V.:2528; IV Piggyback:742]  Out: 1285 [Urine:1285]    Cardiographics  ECG/EMG Results (last 24 hours)       Procedure Component Value Units Date/Time    ECG 12 Lead [[id number]] Collected: 01/09/22 1833     Updated: 01/10/22 0734     QT Interval 438 ms      QTC Interval 541 ms     Narrative:      Test Reason : Elavated troponin  Blood Pressure :   */*   mmHG  Vent. Rate :  92 BPM     Atrial Rate :  92 BPM     P-R Int : 104 ms          QRS Dur : 166 ms      QT Int : 438 ms       P-R-T Axes :  10 270  58 degrees     QTc Int : 541 ms    Sinus rhythm with short NM  Right bundle branch block  Abnormal ECG  When compared with ECG of 09-JAN-2022 10:28,  Nonspecific T wave abnormality now evident in Inferior leads  T wave inversion more evident in Anterolateral leads    Referred By:            Confirmed By:     SCANNED EKG [[id number]] Resulted: 01/09/22     Updated: 01/10/22 1313    SCANNED EKG [[id number]] Resulted: 01/09/22     Updated: 01/10/22 1319    Adult Transthoracic Echo Complete W/ Cont if Necessary Per Protocol [[id number]] Collected: 01/10/22 0900     Updated: 01/10/22 1431     BSA 2.3 m^2      IVSd 1.2 cm      LVIDd 4.4 cm      LVIDs 2.3 cm      LVPWd 1.3 cm      IVS/LVPW 0.97     FS 47.7 %      EDV(Teich) 87.7 ml      ESV(Teich) 18.1 ml      EF(Teich) 79.3 %      EDV(cubed) 85.2 ml      ESV(cubed) 12.2 ml      EF(cubed) 85.7 %      LV mass(C)d 198.3 grams      LV mass(C)dI 86.1 grams/m^2      SV(Teich) 69.6 ml      SI(Teich) 30.2 ml/m^2      SV(cubed) 73.0 ml      SI(cubed) 31.7 ml/m^2      Ao root diam 2.8 cm      Ao root area 6.2 cm^2      ACS 1.7 cm      LA dimension 3.8 cm      LA/Ao 1.4     LVOT diam 2.0 cm      LVOT area 3.1 cm^2      LVOT area(traced) 3.1 cm^2      LVLd ap4 6.8 cm      EDV(MOD-sp4) 43.0 ml      LVLs ap4 6.0 cm      ESV(MOD-sp4) 14.3 ml      EF(MOD-sp4) 66.7 %      LVLd ap2 7.1 cm      EDV(MOD-sp2) 41.2  ml      LVLs ap2 5.4 cm      ESV(MOD-sp2) 11.0 ml      EF(MOD-sp2) 73.3 %      SV(MOD-sp4) 28.7 ml      SI(MOD-sp4) 12.5 ml/m^2      SV(MOD-sp2) 30.2 ml      SI(MOD-sp2) 13.1 ml/m^2      Ao root area (BSA corrected) 1.2     LV Roger Vol (BSA corrected) 18.7 ml/m^2      LV Sys Vol (BSA corrected) 6.2 ml/m^2      MV E max jay 109.0 cm/sec      MV A max jay 125.0 cm/sec      MV E/A 0.87     MV P1/2t max jay 121.0 cm/sec      MV P1/2t 52.3 msec      MVA(P1/2t) 4.2 cm^2      MV dec slope 678.0 cm/sec^2      Ao pk jay 149.0 cm/sec      Ao max PG 8.9 mmHg      Ao max PG (full) 2.1 mmHg      Ao V2 mean 103.0 cm/sec      Ao mean PG 5.0 mmHg      Ao mean PG (full) 2.0 mmHg      Ao V2 VTI 24.8 cm      MARIALUISA(I,A) 2.5 cm^2      MARIALUISA(I,D) 2.5 cm^2      MARIALUISA(V,A) 2.7 cm^2      MARIALUISA(V,D) 2.7 cm^2      LV V1 max PG 6.8 mmHg      LV V1 mean PG 3.0 mmHg      LV V1 max 130.0 cm/sec      LV V1 mean 78.3 cm/sec      LV V1 VTI 19.5 cm      SV(Ao) 152.7 ml      SI(Ao) 66.3 ml/m^2      SV(LVOT) 61.3 ml      SI(LVOT) 26.6 ml/m^2      PA V2 max 140.0 cm/sec      PA max PG 7.8 mmHg      PA max PG (full) 2.0 mmHg      RV V1 max PG 5.9 mmHg      RV V1 mean PG 3.0 mmHg      RV V1 max 121.0 cm/sec      RV V1 mean 76.8 cm/sec      RV V1 VTI 17.8 cm      MVA P1/2T LCG 1.8 cm^2       CV ECHO COLT - BZI_BMI 45.0 kilograms/m^2       CV ECHO COLT - BSA([facility]) 2.5 m^2       CV ECHO COLT - BZI_METRIC_WEIGHT 126.6 kg       CV ECHO COLT - BZI_METRIC_HEIGHT 167.6 cm      Target HR (85%) 134 bpm      Max. Pred. HR (100%) 158 bpm             Results for orders placed during the hospital encounter of 10/11/21    Adult Transthoracic Echo Limited W/ Cont if Necessary Per Protocol    Interpretation Summary  · The study is technically difficult for diagnosis with poor acoustic windows. The quality of the study is limited due to patient body habitus and lung disease. Verbal consent was obtained from the patient to use Definity image enhancer in order to optimize  the study.  · Left ventricular wall thickness is consistent with mild concentric hypertrophy.  · Estimated left ventricular EF = 63% Left ventricular ejection fraction appears to be 61 - 65%. Left ventricular systolic function is normal.  · The right ventricular cavity is mildly dilated. LA mildly dilated      CT Head Without Contrast    Result Date: 1/9/2022  No acute intracranial process. Unchanged sequela of chronic ischemic microvascular disease and cerebral volume loss. Electronically signed by:  Kamar Khanna MD  1/9/2022 10:52 AM CST Workstation: [id number]    XR Chest 1 View    Result Date: 1/9/2022  Unchanged cardiomegaly with probable mild pulmonary edema. Electronically signed by:  Kamar Khanna MD  1/9/2022 11:12 AM CST Workstation: [id number]    XR Chest 1 View    Result Date: 12/12/2021    Airspace and interstitial infiltrates seen within the right lung. Right pleural effusion. Electronically signed by:  Radha De Leon DO  12/12/2021 8:58 PM CST Workstation: [id number]    XR Chest Post CVA Port    Result Date: 1/9/2022  CONCLUSION: Right PICC line now in place with tip projected over the proximal superior vena cava. Right internal jugular multilumen catheter remains in place with tip projected over the proximal superior vena cava. Sternotomy. Cardiomegaly with minimal pulmonary vascular congestion and interstitial edema. Perhaps very small right pleural effusion. [id number] Electronically signed by:  Jon Patricia MD  1/9/2022 12:35 PM CST Workstation: [id number]      Intake/Output         01/09/22 0700 - 01/10/22 0659 01/10/22 0700 - 01/11/22 0659    Intake (ml) 3270 0    Output (ml) 1285 1250    Net (ml) 1985 -1250    Last Weight 127 kg (279 lb 5.2 oz) 127 kg (279 lb 15.8 oz)              Assessment/Plan     Abnormal Troponin not consistent with ACS.   This is likely secondary to acute medical illness.   The pt. has not had CP. No high-risk features.   Most recent troponin was  0.6 and is trending down  -Medical Management  -TTE to evaluate EF and for RWMAs.  -The pt. will not require an ischemia evaluation at some point. She has high risk, diffuse coronary artery disease. No invasive interventions in the absence of symptoms.             Disposition: per primary team. Will sign off at this time.     I discussed the patient's findings and my recommendations with patient and nursing staff and Dr. Handy            This document has been electronically signed by BRIDGETT Waters on January 10, 2022 14:58 CST      Electronically signed by BRIDGETT Waters, 01/10/22, 2:58 PM CST.    Patient examined and chart reviewed in detail.  Patient seen by me at about 1 PM today.    Yamileth Slater is a 61-year-old female with a past medical history significant for COPD, CKD/ESRD on hemodialysis 3 times a week, CAD status post CABG and PCI to left main/proximal circumflex coronary artery in November 2021 by Dr. Rios,  DM 2, hypertension, morbid obesity, PVD who presented to the hospital with altered mental status.  The patient apparently became severely lethargic and noted to have DKA/HHS.    She has had previous intervention to left main/LCx by Dr. Voss at [place].    Patient was drowsy and unable to give a history.  CT scan of the head did not reveal any acute changes.  She underwent dialysis today.  Heart rate 85 bpm.  Blood pressure 130/80 mmHg.  Exam the heart showed normal first and second heart sounds with no S3 or S4.  Lung exam showed normal breath sounds with no rales or rhonchi.  Abdomen is obese with no definite mass or tenderness.    All her lab results were reviewed in detail.  BUN was 32 and creatinine 2.91.  Hemoglobin is 8.5.  Troponin elevated at 0.626.    To new current management.  No definite indication for any invasive work-up at this time.  Will review echocardiogram that has been ordered.  Continue current medical management including current cardiac  medications.    Electronically signed by Margarito Davis MD, 01/10/22, 7:09 PM CST.

## 2022-01-10 NOTE — PAYOR COMM NOTE
"Emilie Adams  Case Management Extender  [phone] Phone  [phone] Fax    REF#[id number]      Yamileth Slater (61 y.o. Female)             Date of Birth Social Security Number Address Home Phone MRN    1960  [address]  [place] [address] [phone] [id number]    Sikh Marital Status             None        Admission Date Admission Type Admitting Provider Attending Provider Department, Room/Bed    1/9/22 Emergency Kit Brar MD McNevin, James, MD 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                 Active Insurance as of 1/9/2022     Primary Coverage     Payor Plan Insurance Group Employer/Plan Group    ANTHEM MEDICARE REPLACEMENT ANTHEM MEDICARE ADVANTAGE [id number]     Payor Plan Address Payor Plan Phone Number Payor Plan Fax Number Effective Dates    [address] [phone]  1/1/2022 - None Entered    [facility] [id number]       Subscriber Name Subscriber Birth Date Member ID       YAMILETH SLATER 1960 [id number]           Secondary Coverage     Payor Plan Insurance Group Employer/Plan Group    [place]      Payor Plan Address Payor Plan Phone Number Payor Plan Fax Number Effective Dates    [address] [phone]  6/28/2019 - None Entered    [facility] [id number]       Subscriber Name Subscriber Birth Date Member ID       YAMILETH SLATER 1960 [id number]                 Emergency Contacts      (Rel.) Home " Phone Work Phone Mobile Phone    Yamileth Chavez (Daughter) [phone] -- [phone]    Suzanne Rievra (Daughter) [phone] -- [phone]    Huy Slater (Spouse) [phone] -- [phone]               History & Physical      Luz Quijano MD at 22 1549              HISTORY AND PHYSICAL  NAME: Yamileth Slater  : 1960  MRN: [id number]    DATE OF ADMISSION:  2022     DATE & TIME SEEN: 22 at 1415    PCP: Rianna Macias MD    CODE STATUS: Full code    CHIEF COMPLAINT:  AMS    HPI:  Yamileth Slater is a 61 y.o. female with a CMH of CKD stage 4 on dialsysi MWF, CAD s/p CABG x3  uncontrolled DM type 2, HFpEF, GERD, HTN, HLD, MIKE on CPAP who presents to the ED with AMS. Patient is accompanied with  who states last known normal was around midnight. She went to bed around 9pm after dinner. She woke up multiple times through the night with increased urinary frequency and wet the bed. Around 7 am this morning patient's  noticed patient was altered and brought her to ED. She is a [facility] dialysis patient and completed her dialysis on Friday without difficulty.    ED course: Serum glucose 800, 1 L NS, CT Head wnl, EKG, sinus tachycardia, picc line placed, Zosyn/Vancomycin started, Insulin Drip ordered.           CONCURRENT MEDICAL HISTORY:  Past Medical History:   Diagnosis Date   • Acute blood loss anemia 2017    Likely due to gastric oozing at this time. - Dr. Duarte (GI) was consulted and has now signed off, will follow up outpatient - pill colonoscopy showed AVMs - continue to monitor   • Anxiety    • CAD (coronary artery disease) 2021    S/P 3 stents 2021 for BHL Continue ASA 81mg & Clopidogrel 75mg Continue Atorvastatin 40mg   • Carotid artery stenosis    • Chronic obstructive lung disease (HCC)    • CKD (chronic kidney disease) stage 4, GFR 15-29 ml/min (HCC)    • Colonic polyp    • Coronary arteriosclerosis    • Diabetes mellitus (HCC)    • Diabetic  neuropathy (HCC)    • Ear pain, right 10/18/2021    - canal trauma due to patient scratching and DMT2 - added cortisporin ear drops   • Elevated troponin 10/12/2021    -most likely from CKD -Trending down -Neg chest pain   • GERD (gastroesophageal reflux disease)    • GI bleed 5/13/2021    - GI will follow up outpatient - Protonix 40mg daily - Avoid medical DVT prophy and use mechanical at this time instead. - Continue to monitor - pill colonoscopy results showed AVMs   • History of transfusion    • Hypercholesterolemia    • Hypertension    • Hypomagnesemia 6/27/2021    Monitor and replace   • Morbid obesity (HCC)    • Nephrolithiasis    • Peripheral vascular disease (HCC)    • Sleep apnea    • Substance abuse (HCC)    • Vitamin D deficiency        PAST SURGICAL HISTORY:  Past Surgical History:   Procedure Laterality Date   • CARDIAC CATHETERIZATION N/A 7/14/2020   • CARDIAC CATHETERIZATION N/A 4/23/2021    Procedure: Left Heart Cath;  Surgeon: Melba Romo MD;  Location: [facility] CATH INVASIVE LOCATION;  Service: Cardiology;  Laterality: N/A;   • CARDIAC CATHETERIZATION N/A 4/30/2021    Procedure: Percutaneous Coronary Intervention;  Surgeon: Russell Voss MD;  Location: [facility] CATH INVASIVE LOCATION;  Service: Cardiovascular;  Laterality: N/A;   • CARDIAC CATHETERIZATION N/A 4/30/2021    Procedure: Stent NIKKI coronary;  Surgeon: Russell Voss MD;  Location: [facility] CATH INVASIVE LOCATION;  Service: Cardiovascular;  Laterality: N/A;   • CARDIAC CATHETERIZATION Left 11/13/2021    Procedure: Left Heart Cath;  Surgeon: Naill Rios MD;  Location: [facility] CATH INVASIVE LOCATION;  Service: Cardiology;  Laterality: Left;   • CAROTID STENT Left    • COLONOSCOPY     • COLONOSCOPY N/A 5/14/2021    Procedure: COLONOSCOPY;  Surgeon: Mingo Duarte MD;  Location: [facility] ENDOSCOPY;  Service: Gastroenterology;  Laterality: N/A;   • CORONARY ARTERY BYPASS GRAFT N/A 2013    CABG X 3   • CYSTOSCOPY  BLADDER STONE LITHOTRIPSY Bilateral    • ENDOSCOPY N/A 4/12/2021    Procedure: ESOPHAGOGASTRODUODENOSCOPY;  Surgeon: Mingo Duarte MD;  Location: [facility] ENDOSCOPY;  Service: Gastroenterology;  Laterality: N/A;   • ENDOSCOPY N/A 5/14/2021    Procedure: ESOPHAGOGASTRODUODENOSCOPY;  Surgeon: Mingo Duarte MD;  Location: [facility] ENDOSCOPY;  Service: Gastroenterology;  Laterality: N/A;   • INTERVENTIONAL RADIOLOGY PROCEDURE N/A 10/21/2021    Procedure: tunneled central venous catheter placement;  Surgeon: Donnie Robles MD;  Location: [facility] ANGIO INVASIVE LOCATION;  Service: Interventional Radiology;  Laterality: N/A;       FAMILY HISTORY:  Family History   Problem Relation Age of Onset   • Heart disease Mother    • Lung cancer Mother    • Heart disease Father    • Heart attack Father    • Diabetes Father    • Heart disease Half-Sister         Dad's side   • Heart disease Brother    • No Known Problems Sister    • No Known Problems Sister    • No Known Problems Sister    • No Known Problems Sister    • No Known Problems Sister    • Pancreatic cancer Half-Sister         Dad's side   • No Known Problems Brother    • No Known Problems Brother    • Heart attack Half-Brother    • Heart attack Half-Brother    • No Known Problems Maternal Grandmother    • No Known Problems Maternal Grandfather    • No Known Problems Paternal Grandmother    • No Known Problems Paternal Grandfather         SOCIAL HISTORY:  Social History     Socioeconomic History   • Marital status:      Spouse name: wesley dumont   Tobacco Use   • Smoking status: Former Smoker     Packs/day: 0.25     Years: 46.00     Pack years: 11.50     Types: Cigarettes   • Smokeless tobacco: Never Used   • Tobacco comment: only smoking 5 a day - quit april 23 2021   Substance and Sexual Activity   • Alcohol use: No   • Drug use: Not Currently     Types: LSD, Marijuana, Methamphetamines   • Sexual activity: Not Currently       HOME MEDICATIONS:  Prior  to Admission medications    Medication Sig Start Date End Date Taking? Authorizing Provider   acetaminophen (Tylenol 8 Hour) 650 MG 8 hr tablet Take 1 tablet by mouth Every 8 (Eight) Hours As Needed for Mild Pain . 11/22/21  Yes Blake Burris MD   albuterol (PROVENTIL) (2.5 MG/3ML) 0.083% nebulizer solution Inhale the contents of 1 vial by nebulization Every 4 (Four) Hours As Needed for Wheezing. 10/28/21  Yes Haily Mcneil MD   albuterol sulfate  (90 Base) MCG/ACT inhaler Inhale 2 puffs Every 4 (Four) Hours As Needed for Wheezing. 3/26/21  Yes Nirmal Calvillo MD   aspirin (aspirin) 81 MG EC tablet Take 1 tablet by mouth Daily. 5/1/21  Yes Jr Jaime Estrada MD   atorvastatin (LIPITOR) 20 MG tablet TAKE ONE TABLET BY MOUTH EVERY NIGHT AT BEDTIME 12/10/21  Yes Blake Burris MD   clopidogrel (PLAVIX) 75 MG tablet Take 1 tablet by mouth Daily. 3/26/21  Yes Nirmal Calvillo MD   cyclobenzaprine (FLEXERIL) 10 MG tablet Take 1 tablet by mouth 2 (Two) Times a Day As Needed for Muscle Spasms. 3/26/21  Yes Nirmal Calvillo MD   fluticasone (FLONASE) 50 MCG/ACT nasal spray 2 sprays by Each Nare route Daily. 11/16/21  Yes Alvarado Mauricio MD   gabapentin (NEURONTIN) 800 MG tablet Take 1 tablet by mouth 3 (Three) Times a Day. 12/1/21  Yes Helder Lee MD   glucose blood test strip Use as instructed 10/9/20  Yes Nirmal Calvillo MD   insulin aspart (novoLOG FLEXPEN) 100 UNIT/ML solution pen-injector sc pen Inject 30 Units under the skin into the appropriate area as directed 3 (Three) Times a Day With Meals. 10/28/21  Yes Haily Mcneil MD   insulin detemir (LEVEMIR) 100 UNIT/ML injection Inject 60 Units under the skin into the appropriate area as directed Daily. 11/16/21  Yes Alvarado Mauricio MD   ipratropium-albuterol (DUO-NEB) 0.5-2.5 mg/3 ml nebulizer Take 3 mL by nebulization 4 (Four) Times a Day. 3/22/17  Yes Provider, MD Caio   lisinopril  (PRINIVIL,ZESTRIL) 5 MG tablet TAKE 1 TABLET BY MOUTH DAILY. 12/15/21  Yes Blake Burris MD   montelukast (SINGULAIR) 10 MG tablet Take 1 tablet by mouth Every Night. 3/26/21  Yes Nirmal Calvillo MD   O2 (OXYGEN) Inhale 3 L/min Continuous. 1/1/21  Yes Caio Thompson MD   oxybutynin XL (DITROPAN-XL) 5 MG 24 hr tablet Take 1 tablet by mouth Daily. 3/26/21  Yes Nirmal Calvillo MD   pantoprazole (PROTONIX) 40 MG EC tablet Take 1 tablet by mouth Every Night. 3/26/21  Yes Nirmal Calvillo MD   Blood Glucose Monitoring Suppl ([employer] Blood Glucose Meter) w/Device kit 1 each 3 (Three) Times a Day. 10/9/20   Nirmal Calvillo MD   bumetanide (BUMEX) 2 MG tablet Take 1 tablet by mouth 4 (Four) Times a Week. 12/16/21   Jerman Coffman MD   cetirizine (zyrTEC) 10 MG tablet Take 1 tablet by mouth Daily. 3/26/21   Nirmal Calvillo MD   Continuous Blood Gluc  (FreeStyle Jade 2 Moncure) device 1 each Continuous. 3/31/21   Nirmal Calvillo MD   Continuous Blood Gluc Sensor (FreeStyle Jade 2 Sensor) misc 1 each Every 14 (Fourteen) Days. 3/31/21   Nirmal Calvillo MD   DULoxetine (CYMBALTA) 20 MG capsule TAKE 1 CAPSULE BY MOUTH DAILY. 7/8/21   Nirmal Calvillo MD   EASY TOUCH PEN NEEDLES 31G X 8 MM misc  8/7/20   Caio Thompson MD   epoetin hubert-epbx (RETACRIT) 06609 UNIT/ML injection Inject 1 mL under the skin into the appropriate area as directed 3 (Three) Times a Week. Indications: ESRD on Dialysis 11/17/21   Alvarado Mauricio MD   ferrous sulfate (FeroSul) 325 (65 FE) MG tablet Take 1 tablet by mouth Daily With Breakfast. 3/26/21   Nirmal Calvillo MD   Insulin Pen Needle (NovoFine) 30G X 8 MM misc As directed 4 times daily 3/26/21   Nirmal Calvillo MD   Insulin Pen Needle 31G X 8 MM misc Use to inject insulin 4 (Four) Times a Day as directed. 10/28/21   Haily Mcneil MD   isosorbide mononitrate (IMDUR) 30 MG 24 hr tablet TAKE  1 TABLET BY MOUTH EVERY MORNING. 12/15/21   Blake Burris MD   levothyroxine (SYNTHROID, LEVOTHROID) 25 MCG tablet Take 25 mcg by mouth Daily. 9/1/21   Yadira Delarosa MD   lidocaine (LIDODERM) 5 % APPLY TO THE AFFECTED AREA(S) TOPICALLY TWO TIMES A DAY. REMOVE NIGHTLY 10/8/21   Blake Burris MD   metoprolol tartrate (LOPRESSOR) 50 MG tablet TAKE ONE TABLET BY MOUTH TWO TIMES A DAY. HOLD IF PULSE IS LESS THAN 60 12/10/21   Blake Burris MD   nitroglycerin (NITROSTAT) 0.4 MG SL tablet Place 0.4 mg under the tongue Every 5 (Five) Minutes As Needed for Chest Pain (x 3 doses). 1/1/15   Caio Thompson MD   nystatin (MYCOSTATIN) 923685 UNIT/GM powder Apply  topically to the appropriate area as directed 3 (Three) Times a Day. 10/1/21   Carline Coffey MD   ondansetron ODT (Zofran ODT) 4 MG disintegrating tablet Place 1 tablet on the tongue Every 8 (Eight) Hours As Needed for Nausea or Vomiting. 3/26/21   Nirmal Calvillo MD   promethazine (PHENERGAN) 25 MG tablet Take 25 mg by mouth Every 6 (Six) Hours As Needed. 7/13/21   Caio Thompson MD   ranolazine (RANEXA) 500 MG 12 hr tablet TAKE 1 TABLET BY MOUTH EVERY 12 (TWELVE) HOURS. 12/15/21   Blake Burris MD   sennosides-docusate (PERICOLACE) 8.6-50 MG per tablet Take 2 tablets by mouth 2 (Two) Times a Day. 10/28/21   Haily Mcneil MD       ALLERGIES:  Adhesive tape and Other    REVIEW OF SYSTEMS  Review of Systems   Unable to perform ROS: Mental status change       PHYSICAL EXAM:  Temp:  [98.2 °F (36.8 °C)-101.2 °F (38.4 °C)] 101.2 °F (38.4 °C)  Heart Rate:  [102-108] 103  Resp:  [16-24] 24  BP: (168-211)/(69-95) 176/77  Body mass index is 45.83 kg/m².  Physical Exam  Vitals reviewed.   Constitutional:       General: She is in acute distress.      Appearance: She is toxic-appearing.   HENT:      Mouth/Throat:      Mouth: Mucous membranes are dry.   Cardiovascular:      Rate and Rhythm: Regular rhythm. Tachycardia present.       Pulses: Normal pulses.      Heart sounds: Normal heart sounds.   Pulmonary:      Breath sounds: Rhonchi present.   Abdominal:      General: Abdomen is flat.      Palpations: Abdomen is soft.   Neurological:      Mental Status: She is disoriented and confused.         DIAGNOSTIC DATA:   Lab Results (last 24 hours)     Procedure Component Value Units Date/Time    Phosphorus [[id number]]  (Abnormal) Collected: 01/09/22 1618    Specimen: Blood Updated: 01/09/22 1653     Phosphorus 5.1 mg/dL     Extra Tubes [[id number]] Collected: 01/09/22 1618    Specimen: Blood, Venous Line Updated: 01/09/22 1642    Narrative:      The following orders were created for panel order Extra Tubes.  Procedure                               Abnormality         Status                     ---------                               -----------         ------                     Green Top (Gel)[[id number]]                                  In process                 Green Top (Gel)[[id number]]                                  In process                   Please view results for these tests on the individual orders.    Green Top (Gel) [[id number]] Collected: 01/09/22 1618    Specimen: Blood Updated: 01/09/22 1642    Green Top (Gel) [[id number]] Collected: 01/09/22 1618    Specimen: Blood Updated: 01/09/22 1642    Troponin [[id number]] Collected: 01/09/22 1618    Specimen: Blood Updated: 01/09/22 1641    Extra Tubes [[id number]] Collected: 01/09/22 1627    Specimen: Blood, Venous Line Updated: 01/09/22 1627    Narrative:      The following orders were created for panel order Extra Tubes.  Procedure                               Abnormality         Status                     ---------                               -----------         ------                     Lavender Top[[id number]]                                     In process                   Please view results for these tests on the individual orders.    Lavender Top [[id number]] Collected: 01/09/22  1627    Specimen: Blood Updated: 01/09/22 1627    Blood Culture - Blood, Arm, Left [[id number]] Collected: 01/09/22 1618    Specimen: Blood from Arm, Left Updated: 01/09/22 1626    Blood Culture - Blood, Hand, Left [[id number]] Collected: 01/09/22 1619    Specimen: Blood from Hand, Left Updated: 01/09/22 1626    Procalcitonin [[id number]] Collected: 01/09/22 1618    Specimen: Blood Updated: 01/09/22 1626    Basic Metabolic Panel [[id number]] Collected: 01/09/22 1618    Specimen: Blood Updated: 01/09/22 1626    STAT Lactic Acid, Reflex [[id number]] Collected: 01/09/22 1618    Specimen: Blood Updated: 01/09/22 1626    Osmolality, Serum [[id number]]  (Abnormal) Collected: 01/09/22 1214    Specimen: Blood Updated: 01/09/22 1541     Osmolality 341 mOsm/kg     Phosphorus [[id number]]  (Abnormal) Collected: 01/09/22 1214    Specimen: Blood Updated: 01/09/22 1527     Phosphorus 6.1 mg/dL     Urinalysis, Microscopic Only - Urine, Catheter [[id number]]  (Abnormal) Collected: 01/09/22 1450    Specimen: Urine, Catheter Updated: 01/09/22 1519     RBC, UA 0-2 /HPF      WBC, UA 0-2 /HPF      Bacteria, UA Trace /HPF      Squamous Epithelial Cells, UA 0-2 /HPF      Yeast, UA Small/1+ Yeast /HPF      Hyaline Casts, UA None Seen /LPF      Methodology Manual Light Microscopy    Urine Drug Screen - Urine, Clean Catch [[id number]]  (Normal) Collected: 01/09/22 1450    Specimen: Urine, Clean Catch Updated: 01/09/22 1505     THC, Screen, Urine Negative     Phencyclidine (PCP), Urine Negative     Cocaine Screen, Urine Negative     Methamphetamine, Ur Negative     Opiate Screen Negative     Amphetamine Screen, Urine Negative     Benzodiazepine Screen, Urine Negative     Tricyclic Antidepressants Screen Negative     Methadone Screen, Urine Negative     Barbiturates Screen, Urine Negative     Oxycodone Screen, Urine Negative     Propoxyphene Screen Negative     Buprenorphine, Screen, Urine Negative    Narrative:      Cutoff For Drugs  Screened:    Amphetamines               500 ng/ml  Barbiturates               200 ng/ml  Benzodiazepines            150 ng/ml  Cocaine                    150 ng/ml  Methadone                  200 ng/ml  Opiates                    100 ng/ml  Phencyclidine               25 ng/ml  THC                            50 ng/ml  Methamphetamine            500 ng/ml  Tricyclic Antidepressants  300 ng/ml  Oxycodone                  100 ng/ml  Propoxyphene               300 ng/ml  Buprenorphine               10 ng/ml    The normal value for all drugs tested is negative. This report includes unconfirmed screening results, with the cutoff values listed, to be used for medical treatment purposes only.  Unconfirmed results must not be used for non-medical purposes such as employment or legal testing.  Clinical consideration should be applied to any drug of abuse test, particularly when unconfirmed results are used.      Urinalysis With Culture If Indicated - Urine, Catheter [[id number]]  (Abnormal) Collected: 01/09/22 1450    Specimen: Urine, Catheter Updated: 01/09/22 1500     Color, UA Yellow     Appearance, UA Clear     pH, UA 7.0     Specific Gravity, UA 1.022     Glucose, UA >=1000 mg/dL (3+)     Ketones, UA 15 mg/dL (1+)     Bilirubin, UA Negative     Blood, UA Small (1+)     Protein, UA >=300 mg/dL (3+)     Leuk Esterase, UA Negative     Nitrite, UA Negative     Urobilinogen, UA 0.2 E.U./dL    COVID-19 and FLU A/B PCR - Swab, Nasopharynx [[id number]]  (Normal) Collected: 01/09/22 1413    Specimen: Swab from Nasopharynx Updated: 01/09/22 1437     COVID19 Not Detected     Influenza A PCR Not Detected     Influenza B PCR Not Detected    Narrative:      Fact sheet for providers: https://www.fda.gov/media/953492/download    Fact sheet for patients: https://www.fda.gov/media/265251/download    Test performed by PCR.    Acetone [[id number]]  (Abnormal) Collected: 01/09/22 1214    Specimen: Blood Updated: 01/09/22 1348     Acetone  Moderate    Lactic Acid, Plasma [[id number]]  (Abnormal) Collected: 01/09/22 1214    Specimen: Blood Updated: 01/09/22 1321     Lactate 2.5 mmol/L     Extra Tubes [[id number]] Collected: 01/09/22 1214    Specimen: Blood Updated: 01/09/22 1315    Narrative:      The following orders were created for panel order Extra Tubes.  Procedure                               Abnormality         Status                     ---------                               -----------         ------                     Gold Top - SST[[id number]]                                   Final result                 Please view results for these tests on the individual orders.    Gold Top - SST [[id number]] Collected: 01/09/22 1214    Specimen: Blood Updated: 01/09/22 1315     Extra Tube Hold for add-ons.     Comment: Auto resulted.       Comprehensive Metabolic Panel [[id number]]  (Abnormal) Collected: 01/09/22 1214    Specimen: Blood Updated: 01/09/22 1305     Glucose 800 mg/dL      BUN 44 mg/dL      Creatinine 3.46 mg/dL      Sodium 129 mmol/L      Potassium 4.0 mmol/L      Chloride 87 mmol/L      CO2 18.0 mmol/L      Calcium 9.0 mg/dL      Total Protein 8.0 g/dL      Albumin 3.80 g/dL      ALT (SGPT) 7 U/L      AST (SGOT) 10 U/L      Alkaline Phosphatase 192 U/L      Total Bilirubin 0.2 mg/dL      eGFR Non African Amer 13 mL/min/1.73      Comment: <15 Indicative of kidney failure.        eGFR   Amer --     Comment: <15 Indicative of kidney failure.        Globulin 4.2 gm/dL      A/G Ratio 0.9 g/dL      BUN/Creatinine Ratio 12.7     Anion Gap 24.0 mmol/L     Narrative:      GFR Normal >60  Chronic Kidney Disease <60  Kidney Failure <15      Troponin [[id number]]  (Abnormal) Collected: 01/09/22 1214    Specimen: Blood Updated: 01/09/22 1257     Troponin T 0.268 ng/mL     Narrative:      Troponin T Reference Range:  <= 0.03 ng/mL-   Negative for AMI  >0.03 ng/mL-     Abnormal for myocardial necrosis.  Clinicians would have to utilize clinical  acumen, EKG, Troponin and serial changes to determine if it is an Acute Myocardial Infarction or myocardial injury due to an underlying chronic condition.       Results may be falsely decreased if patient taking Biotin.      aPTT [[id number]]  (Normal) Collected: 01/09/22 1214    Specimen: Blood Updated: 01/09/22 1252     PTT 23.2 seconds     Narrative:      The recommended Heparin therapeutic range is 68-97 seconds.    Protime-INR [[id number]]  (Normal) Collected: 01/09/22 1214    Specimen: Blood Updated: 01/09/22 1252     Protime 14.1 Seconds      INR 1.10    Narrative:      Therapeutic range for most indications is 2.0-3.0 INR,  or 2.5-3.5 for mechanical heart valves.    Lipase [[id number]]  (Normal) Collected: 01/09/22 1214    Specimen: Blood Updated: 01/09/22 1251     Lipase 54 U/L     Ethanol [[id number]] Collected: 01/09/22 1214    Specimen: Blood Updated: 01/09/22 1251     Ethanol <10 mg/dL      Ethanol % <0.010 %     Magnesium [[id number]]  (Normal) Collected: 01/09/22 1214    Specimen: Blood Updated: 01/09/22 1251     Magnesium 1.9 mg/dL     BNP [[id number]]  (Abnormal) Collected: 01/09/22 1214    Specimen: Blood Updated: 01/09/22 1249     proBNP 18,229.0 pg/mL     Narrative:      Among patients with dyspnea, NT-proBNP is highly sensitive for the detection of acute congestive heart failure. In addition NT-proBNP of <300 pg/ml effectively rules out acute congestive heart failure with 99% negative predictive value.    Results may be falsely decreased if patient taking Biotin.      CBC & Differential [[id number]]  (Abnormal) Collected: 01/09/22 1214    Specimen: Blood Updated: 01/09/22 1230    Narrative:      The following orders were created for panel order CBC & Differential.  Procedure                               Abnormality         Status                     ---------                               -----------         ------                     CBC Auto Differential[[id number]]        Abnormal            Final  result                 Please view results for these tests on the individual orders.    CBC Auto Differential [[id number]]  (Abnormal) Collected: 01/09/22 1214    Specimen: Blood Updated: 01/09/22 1230     WBC 17.78 10*3/mm3      RBC 2.44 10*6/mm3      Hemoglobin 7.0 g/dL      Hematocrit 22.1 %      MCV 90.6 fL      MCH 28.7 pg      MCHC 31.7 g/dL      RDW 16.0 %      RDW-SD 53.4 fl      MPV 9.1 fL      Platelets 423 10*3/mm3      Neutrophil % 86.4 %      Lymphocyte % 8.1 %      Monocyte % 3.5 %      Eosinophil % 0.0 %      Basophil % 0.4 %      Immature Grans % 1.6 %      Neutrophils, Absolute 15.36 10*3/mm3      Lymphocytes, Absolute 1.44 10*3/mm3      Monocytes, Absolute 0.62 10*3/mm3      Eosinophils, Absolute 0.00 10*3/mm3      Basophils, Absolute 0.08 10*3/mm3      Immature Grans, Absolute 0.28 10*3/mm3      nRBC 0.0 /100 WBC     Blood Culture - Blood, Arm, Left [[id number]] Collected: 01/09/22 1214    Specimen: Blood from Arm, Left Updated: 01/09/22 1218    Blood Gas, Arterial - [[id number]]  (Abnormal) Collected: 01/09/22 1113    Specimen: Arterial Blood Updated: 01/09/22 1131     Site Left Brachial     Shadi's Test N/A     pH, Arterial 7.316 pH units      Comment: 84 Value below reference range        pCO2, Arterial 34.9 mm Hg      Comment: 84 Value below reference range        pO2, Arterial 98.5 mm Hg      HCO3, Arterial 17.8 mmol/L      Comment: 84 Value below reference range        Base Excess, Arterial -7.5 mmol/L      Comment: 84 Value below reference range        O2 Saturation, Arterial 97.9 %      Barometric Pressure for Blood Gas 754 mmHg      Modality Nasal Cannula     Flow Rate 3.0 lpm      Ventilator Mode NA     Collected by KS     Comment: Meter: [id number]     :  [id number]              Imaging Results (Last 24 Hours)     Procedure Component Value Units Date/Time    US Guidance PICC NC [[id number]] Resulted: 01/09/22 1251     Updated: 01/09/22 1251    Narrative:      This procedure was  auto-finalized with no dictation required.    XR Chest Post CVA Port [[id number]] Collected: 01/09/22 1220     Updated: 01/09/22 1236    Narrative:        PORTABLE CHEST    HISTORY: PICC line placement    Portable AP upright film of the chest was obtained at 12:05 PM.  COMPARISON: 10:36 AM    FINDINGS:   Right PICC line now in place with tip projected over the proximal  superior vena cava.  Right internal jugular multilumen catheter remains in place with  tip projected over the proximal superior vena cava.  Sternotomy.  Cardiomegaly with minimal pulmonary vascular congestion and  interstitial edema.  Perhaps very small right pleural effusion.  Old granulomatous disease.  No pneumothorax.  No acute osseous abnormality.  Degenerative changes are present in the thoracic spine.      Impression:      CONCLUSION:  Right PICC line now in place with tip projected over the proximal  superior vena cava.  Right internal jugular multilumen catheter remains in place with  tip projected over the proximal superior vena cava.  Sternotomy.  Cardiomegaly with minimal pulmonary vascular congestion and  interstitial edema.  Perhaps very small right pleural effusion.    [id number]    Electronically signed by:  Jon Patricia MD  1/9/2022 12:35 PM CST  Workstation: [id number] PICC W Imaging Guidance [[id number]] Resulted: 01/09/22 1227     Updated: 01/09/22 1227    Narrative:      This procedure was auto-finalized with no dictation required.    XR Chest 1 View [[id number]] Collected: 01/09/22 1100     Updated: 01/09/22 1113    Narrative:      EXAM: XR CHEST 1 VIEW    COMPARISONS: Radiograph 12/27/2021    INDICATION: AMS    FINDINGS:  Frontal view of the chest.    Lungs are symmetric and adequately expanded. No focal  consolidation, effusion, or pneumothorax. No change in mildly  prominent central pulmonary vasculature and interstitial  markings. Stable cardiomegaly. No acute osseous abnormalities.  Sternotomy wires are again seen. Right  IJ dialysis catheter is  seen.      Impression:      Unchanged cardiomegaly with probable mild pulmonary edema.    Electronically signed by:  Kamar Khanna MD  1/9/2022  11:12 AM CST Workstation: [id number]    CT Head Without Contrast [[id number]] Collected: 01/09/22 1035     Updated: 01/09/22 1053    Narrative:      EXAM: CT HEAD WITHOUT IV CONTRAST    COMPARISONS: CT head 3/31/2021    INDICATION: AMS    TECHNIQUE: Noncontrast CT images were obtained of the brain.  Reformats were provided. All CT scans at this facility uses dose  modulation, iterative reconstruction, and/or weight-based dosing  when appropriate to achieve a radiation dose as low as reasonably  achievable.    FINDINGS:    No intracranial hemorrhage, appreciable mass, mass effect or  midline shift.     There is preservation of the gray-white matter differentiation.  No dense MCA or insular ribbon sign.    There is cerebral volume loss with ex vacuo ventricular  dilatation. Confluent and patchy periventricular hypodensities  are likely sequela of chronic ischemic microvascular disease.    Calvarium is intact. Paranasal sinuses are unremarkable. Mastoid  air cells are clear. Orbits are unremarkable.      Impression:      No acute intracranial process.    Unchanged sequela of chronic ischemic microvascular disease and  cerebral volume loss.    Electronically signed by:  Kamar Khanna MD  1/9/2022  10:52 AM CST Workstation: [id number]            I reviewed the patient's new clinical results.    ASSESSMENT AND PLAN: This is a 61 y.o. female with:    Active Hospital Problems    Diagnosis  POA   • **Type 2 diabetes mellitus with hyperosmolar hyperglycemic state (HHS) (HCC) [E11.00, E11.65]  Yes     Priority: High     - Admission glucose 800, anion gap 24, pH 7.3, CO2 34.9  - A1c (oct 2021) - 8.8  -HHS protocol started.   - Insulin Drip    - 1L fluids in ED. Caution with fluids as dialysis patient   - BMP, Phosphorus, Magnesium [id number]   -  Moderate Serum Acetone   -Beta hydroxybutyrate pending   -NPO   - Nephrology consulted. Appreciate recommendations  - Picc line in place  - Hold home Diabetes meds  - Dialysis patient MWF (received last dialysis on Friday 1/7/2022)  - Magnesium on admission 1.9  - Protonix 40mg stress ulcer prophylaxis      • SIRS (systemic inflammatory response syndrome) ([facility]) [R65.10]  Yes     Priority: High     Admission   - WBC 17.78    -    - RR 16  - CXR - Mild pulm edema  - Blood cultures pending x2  - Procalcitonin pending  - UA : glucose 1000, negative Leucocytes/nitrate  - Empiric Zosyn and Vancomcyin      • Altered mental status [R41.82]  Yes     - AMS on presentation  - initial ABG pH 7.3, CO2 34  - UA negative for acute cysitits  -CTA head wnl   - Empiric Zosyn and Vancomycin  -Lactate 2.5 on admission   - blood cultures pending       • Personal history of noncompliance with medical treatment, presenting hazards to health [Z91.19]  Not Applicable     · Difficulty showing up to clinic visits due to complexity of medical problems and transportation  · Would really benefit from a couple home visits from PCP to help improve compliance      • Anemia due to chronic kidney disease, on chronic dialysis ([facility]) [N18.6, D63.1, Z99.2]  Not Applicable     - MWF Dialysis patient  - Epoetin (Retacrit) 10,000 units three times a week on dialysis days MWF  - Hg 7 on admission. Transfuse if Hg less than 7  - Type and screen done   - Daily CBC   -Cr. 3.4 on admission         • Unspecified diastolic (congestive) heart failure ([facility]) [I50.30]  Yes     - Continue Imdur, metoprolol, lisinopril, bumex  - Most recent echo 10/2021 : EF 63%  - pro-BNP 94272  -Troponin 0.2, Trend x2   - MWF Dialysis patient, caution with HHS fluids      • MIKE and COPD overlap syndrome ([facility]) [G47.33, J44.9]  Yes     · Home Trilogy/CPAP  · Home 3L oxygen dependent. Maintain strict Sats between 88-92%      • Hypothyroidism [E03.9]  Yes     Continue home  Levothyroxine 25mcg, stable     • Coronary artery disease [I25.10]  Yes     · S/P CABG x 3 (Primary), Bilateral carotid artery stenosis w/ 2 stents on left side,  PAD (peripheral artery disease) with stent in right upper leg  · Continue   - aspirin  - Plavix 75mg daily  - atorvastatin 20 mg daily  - lisinopril 5mg daily  -lopressor 50mg   -Imdur 30mg daily  - Ranexa - 500mg BID   · Troponin 0.26, Trend x2   · EKG - sinus tachycardia             • COPD (chronic obstructive pulmonary disease) (HCC) [J44.9]  Yes     - Dependent on 3L NC at Home  - Maintain strict sats between 88-92%  -Singulair 10mg   -DuoNebs  -NIPPV via Respiratory. Patient uses Trilogy at home/night     • Hypertension [I10]  Yes     · Elevated BP in ER   · Metoprolol 50mg bid, hold if HR < 60bpm  · Lisinopril 5mg daily  · Telemetry  · Hydralazine 10mg prn q6h for SBP > 170  · Labetalol 20mg Q6h >160           DVT prophylaxis: SCDs/TEDs     Yamileth Slater and I have discussed pain goals for this hospitalization after reviewing her current clinical condition, medical history and prior pain experiences.  The goal is to keep the pain level minimal.  To help achieve this, I plan to pain medications.    JULIAN # , reviewed and consistent with patient reported medications.    Expected Length of Stay: Where: home and When:  3-4days    I discussed the patient's findings and my recommendations with family.     Kit Brar MD is the attending on record at time of admission, He is aware of the patient's status and agrees with the above history and physical.        This document has been electronically signed by Luz Quijano MD on January 9, 2022 16:54 CST      Electronically signed by Luz Quijano MD at 01/09/22 1654          Emergency Department Notes      Milly Dial RN at 01/09/22 1015        LKW is 0000 last night.      Milly Dial RN  01/09/22 1016      Electronically signed by Milly Dial RN at 01/09/22 1016     Milly Dial RN  "at 01/09/22 1016        Patient's dialysis is M, W, F     Milly Dial, RN  01/09/22 1016      Electronically signed by Milly Dial RN at 01/09/22 1016     Kate Mccracken LPN at 01/09/22 1026        Pts glucose reading \"HIGH\".     Kate Mccracken LPN  01/09/22 1027      Electronically signed by Kate Mccracken LPN at 01/09/22 1027     Bladimir Morillo MD at 01/09/22 1029      Procedure Orders    1. ECG 12 Lead [[id number]] ordered by Bladimir Morillo MD               Subjective   61-year-old female on dialysis comes to the ER chief complaint of new altered mental status this morning when she woke up.  Last known normal was around midnight per the .     states the patient wears a trilogy at home, but did not wear it last night.      History provided by:  EMS personnel and spouse  History limited by:  Mental status change   used: No        Review of Systems   Unable to perform ROS: Mental status change       Past Medical History:   Diagnosis Date   • Acute blood loss anemia 4/16/2017    Likely due to gastric oozing at this time. - Dr. Duarte (GI) was consulted and has now signed off, will follow up outpatient - pill colonoscopy showed AVMs - continue to monitor   • Anxiety    • Carotid artery stenosis    • Chronic obstructive lung disease (HCC)    • CKD (chronic kidney disease) stage 4, GFR 15-29 ml/min ([facility])    • Colonic polyp    • Coronary arteriosclerosis    • Diabetes mellitus (HCC)    • Diabetic neuropathy (HCC)    • Ear pain, right 10/18/2021    - canal trauma due to patient scratching and DMT2 - added cortisporin ear drops   • Elevated troponin 10/12/2021    -most likely from CKD -Trending down -Neg chest pain   • GERD (gastroesophageal reflux disease)    • GI bleed 5/13/2021    - GI will follow up outpatient - Protonix 40mg daily - Avoid medical DVT prophy and use mechanical at this time instead. - Continue to monitor - pill colonoscopy results showed AVMs   • " History of transfusion    • Hypercholesterolemia    • Hypertension    • Hypomagnesemia 6/27/2021    Monitor and replace   • Morbid obesity (HCC)    • Nephrolithiasis    • Peripheral vascular disease (HCC)    • Sleep apnea    • Substance abuse (HCC)    • Vitamin D deficiency        Allergies   Allergen Reactions   • Adhesive Tape Hives   • Other      Bandaids, MRSA, SURECLOSE       Past Surgical History:   Procedure Laterality Date   • CARDIAC CATHETERIZATION N/A 7/14/2020   • CARDIAC CATHETERIZATION N/A 4/23/2021    Procedure: Left Heart Cath;  Surgeon: Melba Romo MD;  Location: [facility] CATH INVASIVE LOCATION;  Service: Cardiology;  Laterality: N/A;   • CARDIAC CATHETERIZATION N/A 4/30/2021    Procedure: Percutaneous Coronary Intervention;  Surgeon: Russell Voss MD;  [place] [facility] [place];  Service: Cardiovascular;  Laterality: N/A;   • CARDIAC CATHETERIZATION N/A 4/30/2021    Procedure: Stent NIKKI coronary;  Surgeon: Russell Voss MD;  [place] [facility] [place] [place];  Service: Cardiovascular;  Laterality: N/A;   • CARDIAC CATHETERIZATION Left 11/13/2021    Procedure: Left Heart Cath;  Surgeon: Niall Rios MD;  Location: [facility] CATH INVASIVE LOCATION;  Service: Cardiology;  Laterality: Left;   • CAROTID STENT Left    • COLONOSCOPY     • COLONOSCOPY N/A 5/14/2021    Procedure: COLONOSCOPY;  Surgeon: Mingo Duarte MD;  Location: [facility] ENDOSCOPY;  Service: Gastroenterology;  Laterality: N/A;   • CORONARY ARTERY BYPASS GRAFT N/A 2013    CABG X 3   • CYSTOSCOPY BLADDER STONE LITHOTRIPSY Bilateral    • ENDOSCOPY N/A 4/12/2021    Procedure: ESOPHAGOGASTRODUODENOSCOPY;  Surgeon: Mingo Duarte MD;  Location: [facility] ENDOSCOPY;  Service: Gastroenterology;  Laterality: N/A;   • ENDOSCOPY N/A 5/14/2021    Procedure: ESOPHAGOGASTRODUODENOSCOPY;  Surgeon: Mingo Duarte MD;  Location: [facility] ENDOSCOPY;  Service: Gastroenterology;  Laterality: N/A;   •  "INTERVENTIONAL RADIOLOGY PROCEDURE N/A 10/21/2021    Procedure: tunneled central venous catheter placement;  Surgeon: Donnie Robles MD;  Location: [facility]GIO INVASIVE LOCATION;  Service: Interventional Radiology;  Laterality: N/A;       Family History   Problem Relation Age of Onset   • Heart disease Mother    • Lung cancer Mother    • Heart disease Father    • Heart attack Father    • Diabetes Father    • Heart disease Half-Sister         Dad's side   • Heart disease Brother    • No Known Problems Sister    • No Known Problems Sister    • No Known Problems Sister    • No Known Problems Sister    • No Known Problems Sister    • Pancreatic cancer Half-Sister         Dad's side   • No Known Problems Brother    • No Known Problems Brother    • Heart attack Half-Brother    • Heart attack Half-Brother    • No Known Problems Maternal Grandmother    • No Known Problems Maternal Grandfather    • No Known Problems Paternal Grandmother    • No Known Problems Paternal Grandfather        Social History     Socioeconomic History   • Marital status:      Spouse name: wesley dumont   Tobacco Use   • Smoking status: Former Smoker     Packs/day: 0.25     Years: 46.00     Pack years: 11.50     Types: Cigarettes   • Smokeless tobacco: Never Used   • Tobacco comment: only smoking 5 a day - quit april 23 2021   Substance and Sexual Activity   • Alcohol use: No   • Drug use: Not Currently     Types: LSD, Marijuana, Methamphetamines   • Sexual activity: Not Currently           Objective    Vitals:    01/09/22 1101 01/09/22 1201 01/09/22 1246 01/09/22 1304   BP: (!) 184/84 (!) 193/87 (!) 211/95    BP Location:       Patient Position:       Pulse: 102 106 108    Resp: 16 16 16    Temp:       TempSrc:       SpO2: 98% 97% 98%    Weight:       Height:    167.6 cm (66\")       Physical Exam  Vitals and nursing note reviewed.   Constitutional:       Appearance: She is well-developed. She is obese. She is ill-appearing. She is " not diaphoretic.   HENT:      Head: Normocephalic.      Right Ear: External ear normal.      Left Ear: External ear normal.   Eyes:      Pupils: Pupils are equal, round, and reactive to light.   Pulmonary:      Effort: Pulmonary effort is normal. No accessory muscle usage or respiratory distress.      Breath sounds: No wheezing.   Chest:      Chest wall: No tenderness.   Abdominal:      General: Bowel sounds are normal.      Palpations: Abdomen is soft.      Tenderness: There is no abdominal tenderness (deep palpation).   Musculoskeletal:         General: Normal range of motion.   Skin:     General: Skin is warm and dry.      Capillary Refill: Capillary refill takes less than 2 seconds.   Neurological:      Mental Status: She is alert. She is disoriented and confused.      GCS: GCS eye subscore is 2. GCS verbal subscore is 2. GCS motor subscore is 5.      Cranial Nerves: No facial asymmetry.   Psychiatric:         Behavior: Behavior normal.         ECG 12 Lead      Date/Time: 1/9/2022 1:10 PM  Performed by: Bladimir Morillo MD  Authorized by: Bladimir Morillo MD   Interpreted by physician  Rhythm: sinus tachycardia  Rate: tachycardic  QRS axis: normal  Conduction: right bundle branch block  ST segment elevation noted on lead: none.  Clinical impression: abnormal ECG                ED Course      Results for orders placed or performed during the hospital encounter of 01/09/22   Comprehensive Metabolic Panel    Specimen: Blood   Result Value Ref Range    Glucose 800 (C) 65 - 99 mg/dL    BUN 44 (H) 8 - 23 mg/dL    Creatinine 3.46 (H) 0.57 - 1.00 mg/dL    Sodium 129 (L) 136 - 145 mmol/L    Potassium 4.0 3.5 - 5.2 mmol/L    Chloride 87 (L) 98 - 107 mmol/L    CO2 18.0 (L) 22.0 - 29.0 mmol/L    Calcium 9.0 8.6 - 10.5 mg/dL    Total Protein 8.0 6.0 - 8.5 g/dL    Albumin 3.80 3.50 - 5.20 g/dL    ALT (SGPT) 7 1 - 33 U/L    AST (SGOT) 10 1 - 32 U/L    Alkaline Phosphatase 192 (H) 39 - 117 U/L    Total Bilirubin 0.2 0.0 - 1.2  mg/dL    eGFR Non African Amer 13 (L) >60 mL/min/1.73    eGFR  African Amer      Globulin 4.2 gm/dL    A/G Ratio 0.9 g/dL    BUN/Creatinine Ratio 12.7 7.0 - 25.0    Anion Gap 24.0 (H) 5.0 - 15.0 mmol/L   aPTT    Specimen: Blood   Result Value Ref Range    PTT 23.2 20.0 - 40.3 seconds   Protime-INR    Specimen: Blood   Result Value Ref Range    Protime 14.1 11.1 - 15.3 Seconds    INR 1.10 0.80 - 1.20   Lipase    Specimen: Blood   Result Value Ref Range    Lipase 54 13 - 60 U/L   BNP    Specimen: Blood   Result Value Ref Range    proBNP 18,229.0 (H) 0.0 - 900.0 pg/mL   Troponin    Specimen: Blood   Result Value Ref Range    Troponin T 0.268 (C) 0.000 - 0.030 ng/mL   Ethanol    Specimen: Blood   Result Value Ref Range    Ethanol <10 0 - 10 mg/dL    Ethanol % <0.010 %   Magnesium    Specimen: Blood   Result Value Ref Range    Magnesium 1.9 1.6 - 2.4 mg/dL   CBC Auto Differential    Specimen: Blood   Result Value Ref Range    WBC 17.78 (H) 3.40 - 10.80 10*3/mm3    RBC 2.44 (L) 3.77 - 5.28 10*6/mm3    Hemoglobin 7.0 (L) 12.0 - 15.9 g/dL    Hematocrit 22.1 (L) 34.0 - 46.6 %    MCV 90.6 79.0 - 97.0 fL    MCH 28.7 26.6 - 33.0 pg    MCHC 31.7 31.5 - 35.7 g/dL    RDW 16.0 (H) 12.3 - 15.4 %    RDW-SD 53.4 37.0 - 54.0 fl    MPV 9.1 6.0 - 12.0 fL    Platelets 423 140 - 450 10*3/mm3    Neutrophil % 86.4 (H) 42.7 - 76.0 %    Lymphocyte % 8.1 (L) 19.6 - 45.3 %    Monocyte % 3.5 (L) 5.0 - 12.0 %    Eosinophil % 0.0 (L) 0.3 - 6.2 %    Basophil % 0.4 0.0 - 1.5 %    Immature Grans % 1.6 (H) 0.0 - 0.5 %    Neutrophils, Absolute 15.36 (H) 1.70 - 7.00 10*3/mm3    Lymphocytes, Absolute 1.44 0.70 - 3.10 10*3/mm3    Monocytes, Absolute 0.62 0.10 - 0.90 10*3/mm3    Eosinophils, Absolute 0.00 0.00 - 0.40 10*3/mm3    Basophils, Absolute 0.08 0.00 - 0.20 10*3/mm3    Immature Grans, Absolute 0.28 (H) 0.00 - 0.05 10*3/mm3    nRBC 0.0 0.0 - 0.2 /100 WBC   Blood Gas, Arterial -    Specimen: Arterial Blood   Result Value Ref Range    Site Left  Brachial     Shadi's Test N/A     pH, Arterial 7.316 (L) 7.350 - 7.450 pH units    pCO2, Arterial 34.9 (L) 35.0 - 45.0 mm Hg    pO2, Arterial 98.5 83.0 - 108.0 mm Hg    HCO3, Arterial 17.8 (L) 20.0 - 26.0 mmol/L    Base Excess, Arterial -7.5 (L) 0.0 - 2.0 mmol/L    O2 Saturation, Arterial 97.9 94.0 - 99.0 %    Barometric Pressure for Blood Gas 754 mmHg    Modality Nasal Cannula     Flow Rate 3.0 lpm    Ventilator Mode NA     Collected by KS    Type & Screen    Specimen: Blood   Result Value Ref Range    ABO Type O     RH type Positive     Antibody Screen Negative     T&S Expiration Date 1/12/2022 11:59:59 PM    PREVIOUS HISTORY    Specimen: Blood   Result Value Ref Range    Previous History Previous Record on File    Gold Top - SST   Result Value Ref Range    Extra Tube Hold for add-ons.      *Note: Due to a large number of results and/or encounters for the requested time period, some results have not been displayed. A complete set of results can be found in Results Review.     US Guidance PICC NC   Final Result      XR Chest Post CVA Port   Final Result   CONCLUSION:   Right PICC line now in place with tip projected over the proximal   superior vena cava.   Right internal jugular multilumen catheter remains in place with   tip projected over the proximal superior vena cava.   Sternotomy.   Cardiomegaly with minimal pulmonary vascular congestion and   interstitial edema.   Perhaps very small right pleural effusion.      [id number]      Electronically signed by:  Jon Patricia MD  1/9/2022 12:35 PM CST   Workstation: [email or web address]      IR PICC W Imaging Guidance   Final Result      XR Chest 1 View   Final Result   Unchanged cardiomegaly with probable mild pulmonary edema.      Electronically signed by:  Kamar Khanna MD  1/9/2022   11:12 AM CST Workstation: [id number]      CT Head Without Contrast   Final Result   No acute intracranial process.      Unchanged sequela of chronic ischemic microvascular disease and    cerebral volume loss.      Electronically signed by:  Kamar Khanna MD  1/9/2022   10:52 AM [facility] Workstation: [id number]              MDM  Number of Diagnoses or Management Options  Altered mental status, unspecified altered mental status type: new and requires workup  Diabetic ketoacidosis with coma associated with other specified diabetes mellitus (HCC): new and requires workup  Hypertension, unspecified type: new and requires workup  Diagnosis management comments: Vital signs are stable, afebrile.  Labs obtained significant for glucose of 800, bicarb of 18, anion gap of 24, pH of 7.32 with a normal PCO2 and PO2.  White count is 18 with a hemoglobin of 7.  BNP is 18,000, troponin 0.27.  CT head negative for acute intracranial abnormalities.  Chest x-ray shows cardiomegaly with mild pulmonary edema.  Patient is due for dialysis tomorrow.  Patient received antibiotics, a liter of fluid by EMS, IV insulin and started on an insulin drip for what looks like DKA.  Spoke with the on-call resident who agrees to admit.       Amount and/or Complexity of Data Reviewed  Decide to obtain previous medical records or to obtain history from someone other than the patient: yes        Final diagnoses:   Diabetic ketoacidosis with coma associated with other specified diabetes mellitus (HCC)   Hypertension, unspecified type   Altered mental status, unspecified altered mental status type       ED Disposition  ED Disposition     ED Disposition Condition Comment    Decision to Admit  Level of Care: Stepdown [25]   Diagnosis: Diabetic ketoacidosis with coma associated with other specified diabetes mellitus (HCC) [[id number]]   Admitting Physician: TARIQ CONDON [[id number]]   Attending Physician: TARIQ CONDON [[id number]]            No follow-up provider specified.       Medication List      No changes were made to your prescriptions during this visit.          Bladimir Morillo MD  01/09/22 2240      Electronically signed by  Bladimir Morillo MD at 01/09/22 1318     Milly Dial RN at 01/09/22 1055        This RN called lab and asked for someone to come draw the patient's bloodwork. Two previous attempts were made to get it by this RN and was not successful.      Milly Dial RN  01/09/22 1111      Electronically signed by Milly Dial RN at 01/09/22 1111     Milly Dial RN at 01/09/22 1101        Dalila from [facility] is on her way to insert PICC line into patient's right arm.      Milly Dial RN  01/09/22 1101      Electronically signed by Milly Dial RN at 01/09/22 1101     Milly Dial RN at 01/09/22 1152        Dalila from [facility] at bedside now.      Milly Dial RN  01/09/22 1152      Electronically signed by Milly Dial RN at 01/09/22 1152     Milly Dial RN at 01/09/22 1302        MD made aware of troponin lab value.      Milly Dial RN  01/09/22 1302      Electronically signed by Milly Dial RN at 01/09/22 1302     Milly Dial RN at 01/09/22 1458        Report given to KENTRELL Almendarez on CCU.      Milly Dial RN  01/09/22 1458      Electronically signed by Milly Dial RN at 01/09/22 1458

## 2022-01-10 NOTE — PAYOR COMM NOTE
"  Emilie Adams  Case Management Extender  [phone] Phone  [phone] Fax    REF#[id number]    Yamileth Slater (61 y.o. 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                 Active Insurance as of 1/9/2022     Primary Coverage     Payor Plan Insurance Group Employer/Plan Group    ANTHEM MEDICARE REPLACEMENT ANTHEM MEDICARE ADVANTAGE [id number]     Payor Plan Address Payor Plan Phone Number Payor Plan Fax Number Effective Dates    [address] [phone]  1/1/2022 - None Entered    [facility] [address]       Subscriber Name Subscriber Birth Date Member ID       YAMILETH SLATER 1960 [id number]           Secondary Coverage     Payor Plan Insurance Group Employer/Plan Group    [place]      Payor Plan Address Payor Plan Phone Number Payor Plan Fax Number Effective Dates    [address] [phone]  6/28/2019 - None Entered    [facility] [id number]       Subscriber Name Subscriber Birth Date Member ID       YAMILETH SLATER 1960 [id number]                 Emergency Contacts      (Rel.) Home " Phone Work Phone Mobile Phone    Yamileth Chavez (Daughter) [phone] -- [phone]    Suzanne Rivera (Daughter) [phone] -- [phone]    Huy Slater (Spouse) [phone] -- [phone]               Physician Progress Notes (last 24 hours)      Luz Quijano MD at 01/10/22 1111              CRITICAL CARE DAILY PROGRESS NOTE  Family Medicine Residency Service  NAME: Yamileth Slater  : 1960  MRN: [id number]      LOS: 0 days     PROVIDER OF SERVICE: Luz Quijano MD    Chief Complaint: Type 2 diabetes mellitus with hyperosmolar hyperglycemic state (HHS) (HCC)    Subjective:     Interval History: History provided by: patient chart    Patient was seen resting in bed this morning. She is responsive to her name today. She appears less toxic than on admission yesterday. No acute overnight events. Glucose has been coming down. On admission was 800, now 203. Insulin drip will be stopped and subc insulin will be started. Will continue to monitor glucose. Cardiology is consulted and Dr. Handy is following.     Feeding: NPO; Dialysis patient  Analgesia: Morphine IV , Tylenol   Sedation: none  Thromboprophylaxis: Heparin  HOB: 30 degrees  Ulcer ppx: PPIs  Glucose control: SSI, Basal insulin and Prandial insulin  SBT: N/A  Bowel Regimen:Miralax and Colace   Indwelling catheters: PIC line, Day# 2; Forman catheter is in place.  De-escalation of antibiotics: Blood cultures prending    Review of Systems:   Review of Systems   Unable to perform ROS: Patient nonverbal       Objective:     Vital Signs  Temp:  [99 °F (37.2 °C)-101.2 °F (38.4 °C)] 99.1 °F (37.3 °C)  Heart Rate:  [] 95  Resp:  [16-24] 21  BP: ()/(52-95) 98/52  Body mass index is 45.08 kg/m².         I/O last 3 completed shifts:  In: 3270 [I.V.:2528; IV Piggyback:742]  Out: 1285 [Urine:1285]    Physical Exam  Physical Exam  Constitutional:       Appearance: She is obese.   HENT:      Head: Normocephalic and atraumatic.      Nose: Nose  normal.      Mouth/Throat:      Mouth: Mucous membranes are moist.   Cardiovascular:      Rate and Rhythm: Normal rate and regular rhythm.      Pulses: Normal pulses.      Heart sounds: Normal heart sounds.   Pulmonary:      Effort: Pulmonary effort is normal.      Breath sounds: Rhonchi present.   Abdominal:      General: Abdomen is flat. Bowel sounds are normal.      Palpations: Abdomen is soft.   Musculoskeletal:         General: Normal range of motion.      Cervical back: Normal range of motion.   Skin:     General: Skin is warm and dry.      Capillary Refill: Capillary refill takes less than 2 seconds.   Neurological:      Mental Status: She is alert.   Psychiatric:         Mood and Affect: Mood normal.         Behavior: Behavior normal.         Medication Review    Current Facility-Administered Medications:   •  acetaminophen (TYLENOL) tablet 650 mg, 650 mg, Oral, Q4H PRN **OR** acetaminophen (TYLENOL) 160 MG/5ML solution 650 mg, 650 mg, Oral, Q4H PRN **OR** acetaminophen (TYLENOL) suppository 650 mg, 650 mg, Rectal, Q4H PRN, Luz Quijano MD  •  albumin human 25 % IV SOLN 12.5 g, 12.5 g, Intravenous, PRN, Ace Lauren MD  •  albuterol (PROVENTIL) nebulizer solution 0.083% 2.5 mg/3mL, 2.5 mg, Nebulization, Q4H PRN, Luz Quijano MD  •  aspirin EC tablet 81 mg, 81 mg, Oral, Daily, Luz Quijano MD  •  atorvastatin (LIPITOR) tablet 20 mg, 20 mg, Oral, Daily, Luz Quijano MD  •  sennosides-docusate (PERICOLACE) 8.6-50 MG per tablet 2 tablet, 2 tablet, Oral, BID **AND** polyethylene glycol (MIRALAX) packet 17 g, 17 g, Oral, Daily PRN **AND** bisacodyl (DULCOLAX) EC tablet 5 mg, 5 mg, Oral, Daily PRN **AND** bisacodyl (DULCOLAX) suppository 10 mg, 10 mg, Rectal, Daily PRN, Luz Quijano MD  •  bumetanide (BUMEX) tablet 2 mg, 2 mg, Oral, Once per day on Sun Tue Thu Samm Haney Tehmina, MD  •  clopidogrel (PLAVIX) tablet 75 mg, 75 mg, Oral, Daily, Luz Quijano MD  •  dextrose (D50W) (25 g/50 mL) IV injection  12.5 g, 12.5 g, Intravenous, PRN, Luz Quijano MD  •  dextrose (D50W) (25 g/50 mL) IV injection 25 g, 25 g, Intravenous, Q15 Min PRN, Paulina Solano MD  •  dextrose (GLUTOSE) oral gel 15 g, 15 g, Oral, Q15 Min PRN, Paulina Solano MD  •  gabapentin (NEURONTIN) capsule 300 mg, 300 mg, Oral, Q8H, Luz Quijano MD  •  glucagon (human recombinant) (GLUCAGEN DIAGNOSTIC) injection 1 mg, 1 mg, Subcutaneous, Q15 Min PRN, Paulina Solano MD  •  heparin (porcine) 5000 UNIT/ML injection 5,000 Units, 5,000 Units, Subcutaneous, Q8H, Alvarado Mauricio MD, 5,000 Units at 01/10/22 0526  •  heparin (porcine) injection 2,000 Units, 2,000 Units, Intracatheter, PRN, Ace Lauren MD, 2,000 Units at 01/10/22 0805  •  hydrALAZINE (APRESOLINE) injection 10 mg, 10 mg, Intravenous, Q6H PRN, Jm Barrera APRN, 10 mg at 01/09/22 1628  •  insulin aspart (novoLOG) injection 0-9 Units, 0-9 Units, Subcutaneous, TID AC, Paulina Solano MD  •  insulin aspart (novoLOG) injection 30 Units, 30 Units, Subcutaneous, TID With Meals, Paulina Solano MD  •  insulin detemir (LEVEMIR) injection 30 Units, 30 Units, Subcutaneous, Q12H, Paulina Solano MD, 30 Units at 01/10/22 0749  •  [START ON 1/11/2022] insulin detemir (LEVEMIR) injection 60 Units, 60 Units, Subcutaneous, Nightly, Paulina Solano MD  •  ipratropium-albuterol (DUO-NEB) nebulizer solution 3 mL, 3 mL, Nebulization, 4x Daily - RT, Luz Quijano MD, 3 mL at 01/10/22 1040  •  isosorbide mononitrate (IMDUR) 24 hr tablet 30 mg, 30 mg, Oral, QAM, Luz Quijano MD  •  labetalol (NORMODYNE,TRANDATE) injection 20 mg, 20 mg, Intravenous, Q6H PRN, Luz Quijano MD, 20 mg at 01/09/22 1737  •  levothyroxine (SYNTHROID, LEVOTHROID) tablet 25 mcg, 25 mcg, Oral, Daily, Luz Quijano MD  •  lisinopril (PRINIVIL,ZESTRIL) tablet 5 mg, 5 mg, Oral, Daily, Luz Quijano MD  •  metoprolol tartrate (LOPRESSOR) tablet 50 mg, 50 mg, Oral, Q12H, Luz Quijano MD  •   montelukast (SINGULAIR) tablet 10 mg, 10 mg, Oral, Nightly, Luz Quijano MD  •  morphine injection 1 mg, 1 mg, Intravenous, Q4H PRN, Luz Quijano MD, 1 mg at 01/09/22 1737  •  nystatin (MYCOSTATIN) powder, , Topical, Q12H, Luz Quijano MD, Given at 01/10/22 0825  •  O2 (OXYGEN), 3 L/min, Inhalation, Continuous, Luz Quijano MD, Last Rate: 180,000 mL/hr at 01/09/22 1809, 3 L/min at 01/09/22 1809  •  ondansetron (ZOFRAN) injection 4 mg, 4 mg, Intravenous, Q6H PRN, Luz Quijano MD  •  oxybutynin XL (DITROPAN-XL) 24 hr tablet 5 mg, 5 mg, Oral, Daily, Luz Quijano MD  •  pantoprazole (PROTONIX) injection 40 mg, 40 mg, Intravenous, Q AM, Luz Quijano MD, 40 mg at 01/10/22 0525  •  Pharmacy to dose vancomycin, , Does not apply, Continuous PRN, Luz Quijano MD  •  Pharmacy to Dose Zosyn, , Does not apply, Continuous PRN, Luz Quijano MD  •  piperacillin-tazobactam (ZOSYN) 3.375 g/100 mL 0.9% NS IVPB (mbp), 3.375 g, Intravenous, Q12H, Huy Santa MD  •  ranolazine (RANEXA) 12 hr tablet 500 mg, 500 mg, Oral, Q12H, Luz Quijano MD  •  sodium chloride 0.9 % flush 10 mL, 10 mL, Intravenous, PRN, Luz Quijano MD  •  sodium chloride 0.9 % flush 10 mL, 10 mL, Intravenous, Once PRN, Luz Quijano MD  •  sodium chloride 0.9 % flush 10 mL, 10 mL, Intravenous, Q12H, Luz Quijano MD, 10 mL at 01/10/22 0827  •  sodium chloride 0.9 % flush 10 mL, 10 mL, Intravenous, PRN, Luz Quijano MD  •  sodium chloride 0.9 % flush 3 mL, 3 mL, Intravenous, Q12H, Luz Qiujano MD, Given at 01/10/22 0825  •  sodium chloride 0.9 % infusion, 10 mL/hr, Intravenous, Continuous PRKINJAL, Luz Quijano MD     Diagnostic Data    Lab Results (last 24 hours)     Procedure Component Value Units Date/Time    POC Glucose Once [[id number]]  (Abnormal) Collected: 01/09/22 2218    Specimen: Blood Updated: 01/10/22 1104     Glucose 441 mg/dL      Comment: : [id number] MAG HEATHERMeter ID: [id number]       POC Glucose Once  [[id number]]  (Abnormal) Collected: 01/09/22 2121    Specimen: Blood Updated: 01/10/22 1104     Glucose 505 mg/dL      Comment: : [id number] MAG HEATHERMeter ID: [id number]       POC Glucose Once [[id number]]  (Abnormal) Collected: 01/09/22 2016    Specimen: Blood Updated: 01/10/22 1103     Glucose 571 mg/dL      Comment: : [id number] MAG HEATHERMeter ID: [id number]       Troponin [[id number]]  (Abnormal) Collected: 01/10/22 0820    Specimen: Blood Updated: 01/10/22 0852     Troponin T 0.626 ng/mL     Narrative:      Troponin T Reference Range:  <= 0.03 ng/mL-   Negative for AMI  >0.03 ng/mL-     Abnormal for myocardial necrosis.  Clinicians would have to utilize clinical acumen, EKG, Troponin and serial changes to determine if it is an Acute Myocardial Infarction or myocardial injury due to an underlying chronic condition.       Results may be falsely decreased if patient taking Biotin.      Basic Metabolic Panel [[id number]]  (Abnormal) Collected: 01/10/22 0820    Specimen: Blood Updated: 01/10/22 0849     Glucose 136 mg/dL      BUN 51 mg/dL      Creatinine 3.90 mg/dL      Sodium 138 mmol/L      Potassium 2.8 mmol/L      Chloride 103 mmol/L      CO2 23.0 mmol/L      Calcium 8.8 mg/dL      eGFR   Amer --     Comment: <15 Indicative of kidney failure.        eGFR Non African Amer 12 mL/min/1.73      Comment: <15 Indicative of kidney failure.        BUN/Creatinine Ratio 13.1     Anion Gap 12.0 mmol/L     Narrative:      GFR Normal >60  Chronic Kidney Disease <60  Kidney Failure <15      Phosphorus [[id number]]  (Normal) Collected: 01/10/22 0820    Specimen: Blood Updated: 01/10/22 0849     Phosphorus 3.2 mg/dL     POC Glucose Once [[id number]]  (Normal) Collected: 01/10/22 0821    Specimen: Blood Updated: 01/10/22 0833     Glucose 127 mg/dL      Comment: : [id number] YOSELINKATHLEEN AUDREYMeter ID: [id number]       POC Glucose Once [[id number]]  (Abnormal) Collected: 01/10/22 0524     Specimen: Blood Updated: 01/10/22 0540     Glucose 193 mg/dL      Comment: : [id number] MAG HEATHERMeter ID: [id number]       POC Glucose Once [[id number]]  (Abnormal) Collected: 01/10/22 0420    Specimen: Blood Updated: 01/10/22 0540     Glucose 204 mg/dL      Comment: : [id number] MAG HEATHERMeter ID: [id number]       Troponin [[id number]]  (Abnormal) Collected: 01/10/22 0419    Specimen: Blood Updated: 01/10/22 0538     Troponin T 0.629 ng/mL     Narrative:      Troponin T Reference Range:  <= 0.03 ng/mL-   Negative for AMI  >0.03 ng/mL-     Abnormal for myocardial necrosis.  Clinicians would have to utilize clinical acumen, EKG, Troponin and serial changes to determine if it is an Acute Myocardial Infarction or myocardial injury due to an underlying chronic condition.       Results may be falsely decreased if patient taking Biotin.      Basic Metabolic Panel [[id number]]  (Abnormal) Collected: 01/10/22 0419    Specimen: Blood Updated: 01/10/22 0525     Glucose 203 mg/dL      BUN 52 mg/dL      Creatinine 4.01 mg/dL      Sodium 141 mmol/L      Potassium 3.1 mmol/L      Chloride 105 mmol/L      CO2 22.0 mmol/L      Calcium 8.9 mg/dL      eGFR   Amer --     Comment: <15 Indicative of kidney failure.        eGFR Non African Amer 11 mL/min/1.73      Comment: <15 Indicative of kidney failure.        BUN/Creatinine Ratio 13.0     Anion Gap 14.0 mmol/L     Narrative:      GFR Normal >60  Chronic Kidney Disease <60  Kidney Failure <15      Phosphorus [[id number]]  (Normal) Collected: 01/10/22 0419    Specimen: Blood Updated: 01/10/22 0525     Phosphorus 3.0 mg/dL     CBC (No Diff) [[id number]]  (Abnormal) Collected: 01/10/22 0419    Specimen: Blood Updated: 01/10/22 0459     WBC 11.34 10*3/mm3      RBC 2.95 10*6/mm3      Hemoglobin 8.5 g/dL      Hematocrit 25.6 %      MCV 86.8 fL      MCH 28.8 pg      MCHC 33.2 g/dL      RDW 16.0 %      RDW-SD 50.7 fl      MPV 8.9 fL      Platelets 259  10*3/mm3     POC Glucose Once [[id number]]  (Abnormal) Collected: 01/10/22 0325    Specimen: Blood Updated: 01/10/22 0350     Glucose 204 mg/dL      Comment: : [id number] MAG HEATHERMeter ID: [id number]       POC Glucose Once [[id number]]  (Abnormal) Collected: 01/10/22 0236    Specimen: Blood Updated: 01/10/22 0350     Glucose 214 mg/dL      Comment: : [id number] MAG HEATHERMeter ID: [id number]       POC Glucose Once [[id number]]  (Abnormal) Collected: 01/10/22 0122    Specimen: Blood Updated: 01/10/22 0350     Glucose 240 mg/dL      Comment: : [id number] MAG HEATHERMeter ID: [id number]       POC Glucose Once [[id number]]  (Abnormal) Collected: 01/10/22 0018    Specimen: Blood Updated: 01/10/22 0350     Glucose 282 mg/dL      Comment: : [id number] MAG HEATHERMeter ID: [id number]       POC Glucose Once [[id number]]  (Abnormal) Collected: 01/09/22 2307    Specimen: Blood Updated: 01/10/22 0349     Glucose 376 mg/dL      Comment: : [id number] MAG HEATHERMeter ID: [id number]       Basic Metabolic Panel [[id number]]  (Abnormal) Collected: 01/09/22 2357    Specimen: Blood Updated: 01/10/22 0047     Glucose 311 mg/dL      BUN 49 mg/dL      Creatinine 3.93 mg/dL      Sodium 139 mmol/L      Potassium 2.9 mmol/L      Chloride 101 mmol/L      CO2 21.0 mmol/L      Calcium 8.9 mg/dL      eGFR   Amer --     Comment: <15 Indicative of kidney failure.        eGFR Non African Amer 12 mL/min/1.73      Comment: <15 Indicative of kidney failure.        BUN/Creatinine Ratio 12.5     Anion Gap 17.0 mmol/L     Narrative:      GFR Normal >60  Chronic Kidney Disease <60  Kidney Failure <15      Phosphorus [[id number]]  (Normal) Collected: 01/09/22 2357    Specimen: Blood Updated: 01/10/22 0047     Phosphorus 2.7 mg/dL     Troponin [[id number]]  (Abnormal) Collected: 01/09/22 4918    Specimen: Blood Updated: 01/10/22 0047     Troponin T 0.673 ng/mL     Narrative:       Troponin T Reference Range:  <= 0.03 ng/mL-   Negative for AMI  >0.03 ng/mL-     Abnormal for myocardial necrosis.  Clinicians would have to utilize clinical acumen, EKG, Troponin and serial changes to determine if it is an Acute Myocardial Infarction or myocardial injury due to an underlying chronic condition.       Results may be falsely decreased if patient taking Biotin.      Basic Metabolic Panel [[id number]]  (Abnormal) Collected: 01/09/22 1618    Specimen: Blood Updated: 01/09/22 2248     Glucose 865 mg/dL      BUN 46 mg/dL      Creatinine 3.79 mg/dL      Sodium 132 mmol/L      Potassium 3.8 mmol/L      Chloride 89 mmol/L      CO2 17.0 mmol/L      Calcium 9.2 mg/dL      eGFR   Amer --     Comment: <15 Indicative of kidney failure.        eGFR Non African Amer 12 mL/min/1.73      Comment: <15 Indicative of kidney failure.        BUN/Creatinine Ratio 12.1     Anion Gap 26.0 mmol/L     Narrative:      GFR Normal >60  Chronic Kidney Disease <60  Kidney Failure <15      Troponin [[id number]]  (Abnormal) Collected: 01/09/22 1828    Specimen: Blood Updated: 01/09/22 1946     Troponin T 0.451 ng/mL     Narrative:      Troponin T Reference Range:  <= 0.03 ng/mL-   Negative for AMI  >0.03 ng/mL-     Abnormal for myocardial necrosis.  Clinicians would have to utilize clinical acumen, EKG, Troponin and serial changes to determine if it is an Acute Myocardial Infarction or myocardial injury due to an underlying chronic condition.       Results may be falsely decreased if patient taking Biotin.      Phosphorus [[id number]]  (Normal) Collected: 01/09/22 1828    Specimen: Blood Updated: 01/09/22 1946     Phosphorus 3.2 mg/dL     Basic Metabolic Panel [[id number]]  (Abnormal) Collected: 01/09/22 1828    Specimen: Blood Updated: 01/09/22 1945     Glucose 721 mg/dL      BUN 47 mg/dL      Creatinine 3.87 mg/dL      Sodium 133 mmol/L      Potassium 3.4 mmol/L      Chloride 94 mmol/L      CO2 18.0 mmol/L      Calcium 8.7 mg/dL       eGFR   Amer --     Comment: <15 Indicative of kidney failure.        eGFR Non African Amer 12 mL/min/1.73      Comment: <15 Indicative of kidney failure.        BUN/Creatinine Ratio 12.1     Anion Gap 21.0 mmol/L     Narrative:      GFR Normal >60  Chronic Kidney Disease <60  Kidney Failure <15      Glucose, Random [[id number]]  (Abnormal) Collected: 01/09/22 1828    Specimen: Blood Updated: 01/09/22 1921     Glucose 711 mg/dL     Extra Tubes [[id number]] Collected: 01/09/22 1618    Specimen: Blood, Venous Line Updated: 01/09/22 1730    Narrative:      The following orders were created for panel order Extra Tubes.  Procedure                               Abnormality         Status                     ---------                               -----------         ------                     Green Top (Gel)[[id number]]                                  Final result               Green Top (Gel)[[id number]]                                  Final result                 Please view results for these tests on the individual orders.    Green Top (Gel) [[id number]] Collected: 01/09/22 1618    Specimen: Blood Updated: 01/09/22 1730     Extra Tube Hold for add-ons.     Comment: Auto resulted.       Green Top (Gel) [[id number]] Collected: 01/09/22 1618    Specimen: Blood Updated: 01/09/22 1730     Extra Tube Hold for add-ons.     Comment: Auto resulted.       Extra Tubes [[id number]] Collected: 01/09/22 1627    Specimen: Blood, Venous Line Updated: 01/09/22 1730    Narrative:      The following orders were created for panel order Extra Tubes.  Procedure                               Abnormality         Status                     ---------                               -----------         ------                     Lavender Top[[id number]]                                     Final result                 Please view results for these tests on the individual orders.    Lavender Top [[id number]] Collected: 01/09/22 1627    Specimen:  Blood Updated: 01/09/22 1730     Extra Tube hold for add-on     Comment: Auto resulted       Basic Metabolic Panel [[id number]]  (Abnormal) Collected: 01/09/22 1618    Specimen: Blood Updated: 01/09/22 1709     Glucose 783 mg/dL      BUN 47 mg/dL      Creatinine 3.43 mg/dL      Sodium 133 mmol/L      Potassium 3.9 mmol/L      Chloride 90 mmol/L      CO2 18.0 mmol/L      Calcium 8.9 mg/dL      eGFR   Amer --     Comment: <15 Indicative of kidney failure.        eGFR Non African Amer 14 mL/min/1.73      Comment: <15 Indicative of kidney failure.        BUN/Creatinine Ratio 13.7     Anion Gap 25.0 mmol/L     Narrative:      GFR Normal >60  Chronic Kidney Disease <60  Kidney Failure <15      Troponin [[id number]]  (Abnormal) Collected: 01/09/22 1618    Specimen: Blood Updated: 01/09/22 1709     Troponin T 0.387 ng/mL     Narrative:      Troponin T Reference Range:  <= 0.03 ng/mL-   Negative for AMI  >0.03 ng/mL-     Abnormal for myocardial necrosis.  Clinicians would have to utilize clinical acumen, EKG, Troponin and serial changes to determine if it is an Acute Myocardial Infarction or myocardial injury due to an underlying chronic condition.       Results may be falsely decreased if patient taking Biotin.      STAT Lactic Acid, Reflex [[id number]]  (Abnormal) Collected: 01/09/22 1618    Specimen: Blood Updated: 01/09/22 1707     Lactate 4.8 mmol/L     Procalcitonin [[id number]]  (Abnormal) Collected: 01/09/22 1618    Specimen: Blood Updated: 01/09/22 1701     Procalcitonin 0.29 ng/mL     Narrative:      As a Marker for Sepsis (Non-Neonates):     1. <0.5 ng/mL represents a low risk of severe sepsis and/or septic shock.  2. >2 ng/mL represents a high risk of severe sepsis and/or septic shock.    As a Marker for Lower Respiratory Tract Infections that require antibiotic therapy:  PCT on Admission     Antibiotic Therapy             6-12 Hrs later  >0.5                          Strongly Recommended            >0.25 -  "<0.5             Recommended  0.1 - 0.25                  Discouraged                       Remeasure/reassess PCT  <0.1                         Strongly Discouraged         Remeasure/reassess PCT      As 28 day mortality risk marker: \"Change in Procalcitonin Result\" (>80% or <=80%) if Day 0 (or Day 1) and Day 4 values are available. Refer to http://[email or web address]-[email or web address]/    Change in PCT <=80 %   A decrease of PCT levels below or equal to 80% defines a positive change in PCT test result representing a higher risk for 28-day all-cause mortality of patients diagnosed with severe sepsis or septic shock.    Change in PCT >80 %   A decrease of PCT levels of more than 80% defines a negative change in PCT result representing a lower risk for 28-day all-cause mortality of patients diagnosed with severe sepsis or septic shock.                Phosphorus [[id number]]  (Abnormal) Collected: 01/09/22 1618    Specimen: Blood Updated: 01/09/22 1653     Phosphorus 5.1 mg/dL     Blood Culture - Blood, Arm, Left [[id number]] Collected: 01/09/22 1618    Specimen: Blood from Arm, Left Updated: 01/09/22 1626    Blood Culture - Blood, Hand, Left [[id number]] Collected: 01/09/22 1619    Specimen: Blood from Hand, Left Updated: 01/09/22 1626    Osmolality, Serum [[id number]]  (Abnormal) Collected: 01/09/22 1214    Specimen: Blood Updated: 01/09/22 1541     Osmolality 341 mOsm/kg     Phosphorus [[id number]]  (Abnormal) Collected: 01/09/22 1214    Specimen: Blood Updated: 01/09/22 1527     Phosphorus 6.1 mg/dL     Urinalysis, Microscopic Only - Urine, Catheter [[id number]]  (Abnormal) Collected: 01/09/22 1450    Specimen: Urine, Catheter Updated: 01/09/22 1519     RBC, UA 0-2 /HPF      WBC, UA 0-2 /HPF      Bacteria, UA Trace /HPF      Squamous Epithelial Cells, UA 0-2 /HPF      Yeast, UA Small/1+ Yeast /HPF      Hyaline Casts, UA None Seen /LPF      Methodology Manual Light Microscopy    Urine Drug Screen - Urine, Clean Catch [[id number]] "  (Normal) Collected: 01/09/22 1450    Specimen: Urine, Clean Catch Updated: 01/09/22 1505     THC, Screen, Urine Negative     Phencyclidine (PCP), Urine Negative     Cocaine Screen, Urine Negative     Methamphetamine, Ur Negative     Opiate Screen Negative     Amphetamine Screen, Urine Negative     Benzodiazepine Screen, Urine Negative     Tricyclic Antidepressants Screen Negative     Methadone Screen, Urine Negative     Barbiturates Screen, Urine Negative     Oxycodone Screen, Urine Negative     Propoxyphene Screen Negative     Buprenorphine, Screen, Urine Negative    Narrative:      Cutoff For Drugs Screened:    Amphetamines               500 ng/ml  Barbiturates               200 ng/ml  Benzodiazepines            150 ng/ml  Cocaine                    150 ng/ml  Methadone                  200 ng/ml  Opiates                    100 ng/ml  Phencyclidine               25 ng/ml  THC                            50 ng/ml  Methamphetamine            500 ng/ml  Tricyclic Antidepressants  300 ng/ml  Oxycodone                  100 ng/ml  Propoxyphene               300 ng/ml  Buprenorphine               10 ng/ml    The normal value for all drugs tested is negative. This report includes unconfirmed screening results, with the cutoff values listed, to be used for medical treatment purposes only.  Unconfirmed results must not be used for non-medical purposes such as employment or legal testing.  Clinical consideration should be applied to any drug of abuse test, particularly when unconfirmed results are used.      Urinalysis With Culture If Indicated - Urine, Catheter [[id number]]  (Abnormal) Collected: 01/09/22 1450    Specimen: Urine, Catheter Updated: 01/09/22 1500     Color, UA Yellow     Appearance, UA Clear     pH, UA 7.0     Specific Gravity, UA 1.022     Glucose, UA >=1000 mg/dL (3+)     Ketones, UA 15 mg/dL (1+)     Bilirubin, UA Negative     Blood, UA Small (1+)     Protein, UA >=300 mg/dL (3+)     Leuk Esterase, UA  Negative     Nitrite, UA Negative     Urobilinogen, UA 0.2 E.U./dL    COVID-19 and FLU A/B PCR - Swab, Nasopharynx [[id number]]  (Normal) Collected: 01/09/22 1413    Specimen: Swab from Nasopharynx Updated: 01/09/22 1437     COVID19 Not Detected     Influenza A PCR Not Detected     Influenza B PCR Not Detected    Narrative:      Fact sheet for providers: https://www.fda.gov/media/161218/download    Fact sheet for patients: https://www.fda.gov/media/644842/download    Test performed by PCR.    Acetone [[id number]]  (Abnormal) Collected: 01/09/22 1214    Specimen: Blood Updated: 01/09/22 1348     Acetone Moderate    Lactic Acid, Plasma [[id number]]  (Abnormal) Collected: 01/09/22 1214    Specimen: Blood Updated: 01/09/22 1321     Lactate 2.5 mmol/L     Extra Tubes [[id number]] Collected: 01/09/22 1214    Specimen: Blood Updated: 01/09/22 1315    Narrative:      The following orders were created for panel order Extra Tubes.  Procedure                               Abnormality         Status                     ---------                               -----------         ------                     Gold Top - SST[[id number]]                                   Final result                 Please view results for these tests on the individual orders.    Gold Top - SST [[id number]] Collected: 01/09/22 1214    Specimen: Blood Updated: 01/09/22 1315     Extra Tube Hold for add-ons.     Comment: Auto resulted.       Comprehensive Metabolic Panel [[id number]]  (Abnormal) Collected: 01/09/22 1214    Specimen: Blood Updated: 01/09/22 1305     Glucose 800 mg/dL      BUN 44 mg/dL      Creatinine 3.46 mg/dL      Sodium 129 mmol/L      Potassium 4.0 mmol/L      Chloride 87 mmol/L      CO2 18.0 mmol/L      Calcium 9.0 mg/dL      Total Protein 8.0 g/dL      Albumin 3.80 g/dL      ALT (SGPT) 7 U/L      AST (SGOT) 10 U/L      Alkaline Phosphatase 192 U/L      Total Bilirubin 0.2 mg/dL      eGFR Non African Amer 13 mL/min/1.73      Comment: <15  Indicative of kidney failure.        eGFR   Amer --     Comment: <15 Indicative of kidney failure.        Globulin 4.2 gm/dL      A/G Ratio 0.9 g/dL      BUN/Creatinine Ratio 12.7     Anion Gap 24.0 mmol/L     Narrative:      GFR Normal >60  Chronic Kidney Disease <60  Kidney Failure <15      Troponin [[id number]]  (Abnormal) Collected: 01/09/22 1214    Specimen: Blood Updated: 01/09/22 1257     Troponin T 0.268 ng/mL     Narrative:      Troponin T Reference Range:  <= 0.03 ng/mL-   Negative for AMI  >0.03 ng/mL-     Abnormal for myocardial necrosis.  Clinicians would have to utilize clinical acumen, EKG, Troponin and serial changes to determine if it is an Acute Myocardial Infarction or myocardial injury due to an underlying chronic condition.       Results may be falsely decreased if patient taking Biotin.      aPTT [[id number]]  (Normal) Collected: 01/09/22 1214    Specimen: Blood Updated: 01/09/22 1252     PTT 23.2 seconds     Narrative:      The recommended Heparin therapeutic range is 68-97 seconds.    Protime-INR [[id number]]  (Normal) Collected: 01/09/22 1214    Specimen: Blood Updated: 01/09/22 1252     Protime 14.1 Seconds      INR 1.10    Narrative:      Therapeutic range for most indications is 2.0-3.0 INR,  or 2.5-3.5 for mechanical heart valves.    Lipase [[id number]]  (Normal) Collected: 01/09/22 1214    Specimen: Blood Updated: 01/09/22 1251     Lipase 54 U/L     Ethanol [[id number]] Collected: 01/09/22 1214    Specimen: Blood Updated: 01/09/22 1251     Ethanol <10 mg/dL      Ethanol % <0.010 %     Magnesium [[id number]]  (Normal) Collected: 01/09/22 1214    Specimen: Blood Updated: 01/09/22 1251     Magnesium 1.9 mg/dL     BNP [[id number]]  (Abnormal) Collected: 01/09/22 1214    Specimen: Blood Updated: 01/09/22 1249     proBNP 18,229.0 pg/mL     Narrative:      Among patients with dyspnea, NT-proBNP is highly sensitive for the detection of acute congestive heart failure. In addition NT-proBNP of  <300 pg/ml effectively rules out acute congestive heart failure with 99% negative predictive value.    Results may be falsely decreased if patient taking Biotin.      CBC & Differential [[id number]]  (Abnormal) Collected: 01/09/22 1214    Specimen: Blood Updated: 01/09/22 1230    Narrative:      The following orders were created for panel order CBC & Differential.  Procedure                               Abnormality         Status                     ---------                               -----------         ------                     CBC Auto Differential[[id number]]        Abnormal            Final result                 Please view results for these tests on the individual orders.    CBC Auto Differential [[id number]]  (Abnormal) Collected: 01/09/22 1214    Specimen: Blood Updated: 01/09/22 1230     WBC 17.78 10*3/mm3      RBC 2.44 10*6/mm3      Hemoglobin 7.0 g/dL      Hematocrit 22.1 %      MCV 90.6 fL      MCH 28.7 pg      MCHC 31.7 g/dL      RDW 16.0 %      RDW-SD 53.4 fl      MPV 9.1 fL      Platelets 423 10*3/mm3      Neutrophil % 86.4 %      Lymphocyte % 8.1 %      Monocyte % 3.5 %      Eosinophil % 0.0 %      Basophil % 0.4 %      Immature Grans % 1.6 %      Neutrophils, Absolute 15.36 10*3/mm3      Lymphocytes, Absolute 1.44 10*3/mm3      Monocytes, Absolute 0.62 10*3/mm3      Eosinophils, Absolute 0.00 10*3/mm3      Basophils, Absolute 0.08 10*3/mm3      Immature Grans, Absolute 0.28 10*3/mm3      nRBC 0.0 /100 WBC     Blood Culture - Blood, Arm, Left [[id number]] Collected: 01/09/22 1214    Specimen: Blood from Arm, Left Updated: 01/09/22 1218    Blood Gas, Arterial - [[id number]]  (Abnormal) Collected: 01/09/22 1113    Specimen: Arterial Blood Updated: 01/09/22 1131     Site Left Brachial     Shadi's Test N/A     pH, Arterial 7.316 pH units      Comment: 84 Value below reference range        pCO2, Arterial 34.9 mm Hg      Comment: 84 Value below reference range        pO2, Arterial 98.5 mm Hg      HCO3,  Arterial 17.8 mmol/L      Comment: 84 Value below reference range        Base Excess, Arterial -7.5 mmol/L      Comment: 84 Value below reference range        O2 Saturation, Arterial 97.9 %      Barometric Pressure for Blood Gas 754 mmHg      Modality Nasal Cannula     Flow Rate 3.0 lpm      Ventilator Mode NA     Collected by KS     Comment: Meter: [id number]     :  [id number]             I reviewed the patient's new clinical results.    Assessment/Plan:     Active Hospital Problems    Diagnosis  POA   • **Type 2 diabetes mellitus with hyperosmolar hyperglycemic state (HHS) ([facility]) [E11.00, E11.65]  Yes     Priority: High     - Admission glucose 800, anion gap 24, pH 7.3, CO2 34.9  - A1c (oct 2021) - 8.8  -HHS protocol started.   - Insulin Drip, overlap with Subc insulin once criteria met     - 1L fluids in ED. Caution with fluids as dialysis patient   - BMP, Phosphorus, Magnesium [id number]   - Moderate Serum Acetone   -Beta hydroxybutyrate pending   -NPO   - Nephrology consulted. Appreciate recommendations  - Picc line in place  - Hold home Diabetes meds  - Dialysis patient MWF (received last dialysis on Friday 1/7/2022)  - Magnesium on admission 1.9  - Protonix 40mg stress ulcer prophylaxis       • SIRS (systemic inflammatory response syndrome) ([facility]) [R65.10]  Yes     Priority: High     Admission   - WBC 17.78    -    - RR 16  - 1/10: VSS/wnl  - CXR - Mild pulm edema  - Blood cultures pending x2  - Procalcitonin 0.29  - UA : glucose 1000, negative Leucocytes/nitrate  - Empiric Zosyn and Vancomcyin      • Altered mental status [R41.82]  Yes     - AMS on presentation  - initial ABG pH 7.3, CO2 34  - Procal 0.29  - UA negative for acute cysitits  -CTA head wnl   - Empiric Zosyn and Vancomycin  -Lactate 2.5 on admission   - blood cultures pending       • Personal history of noncompliance with medical treatment, presenting hazards to health [Z91.19]  Not Applicable     · Difficulty showing up to clinic  visits due to complexity of medical problems and transportation  · Would really benefit from a couple home visits from PCP to help improve compliance      • Anemia due to chronic kidney disease, on chronic dialysis ([facility]) [N18.6, D63.1, Z99.2]  Not Applicable     - MWF Dialysis patient  - Epoetin (Retacrit) 10,000 units three times a week on dialysis days MWF  - Hg 7 on admission. Transfuse if Hg less than 7  - Type and screen done   - Daily CBC   -Cr. 3.4 on admission         • Unspecified diastolic (congestive) heart failure ([facility]) [I50.30]  Yes     - Continue Imdur, metoprolol, lisinopril, bumex  - Most recent echo 10/2021 : EF 63%  - pro-BNP 80956  -Troponin 0.387, 0.451, 0.673  - Repeat EKG : NSR with t-wave inversion  -Repeat Echo pending  -Cardiology on board (Dr. Handy) - appreciate recommendations  - MWF Dialysis patient, caution with HHS fluids      • MIKE and COPD overlap syndrome ([facility]) [G47.33, J44.9]  Yes     · Home Trilogy/CPAP  · Home 3L oxygen dependent. Maintain strict Sats between 88-92%      • Hypothyroidism [E03.9]  Yes     Continue home Levothyroxine 25mcg, stable     • Coronary artery disease [I25.10]  Yes     · S/P CABG x 3 (Primary), Bilateral carotid artery stenosis w/ 2 stents on left side,  PAD (peripheral artery disease) with stent in right upper leg  · Continue   - aspirin  - Plavix 75mg daily  - atorvastatin 20 mg daily  - lisinopril 5mg daily  -lopressor 50mg   -Imdur 30mg daily  - Ranexa - 500mg BID   · Troponin 0.26, Trend x2   · EKG - sinus tachycardia             • COPD (chronic obstructive pulmonary disease) ([facility]) [J44.9]  Yes     - Dependent on 3L NC at Home  - Maintain strict sats between 88-92%  -Singulair 10mg   -DuoNebs  -NIPPV via Respiratory. Patient uses Trilogy at home/night      • Hypertension [I10]  Yes     · Elevated BP in ER   · Metoprolol 50mg bid, hold if HR < 60bpm  · Lisinopril 5mg daily  · Telemetry  · Hydralazine 10mg prn q6h for SBP > 170  · Labetalol 20mg Q6h  ">160           Code status is   Code Status and Medical Interventions:   Ordered at: 22 1508     Level Of Support Discussed With:    Next of Kin (If No Surrogate)     Code Status (Patient has no pulse and is not breathing):    CPR (Attempt to Resuscitate)     Medical Interventions (Patient has pulse or is breathing):    Full Support     Comments:    Discussed with  Huy Slater at bedside       Prognosis: fair  Plan for disposition:Where: home and When:  3-4days    Time: Critical care 25 min        This document has been electronically signed by Luz Quijano MD on January 10, 2022 11:20 CST        Electronically signed by Luz Quijaon MD at 01/10/22 1120     Ace Lauren MD at 01/10/22 1015          [facility] NEPHROLOGY [address] [address]  T - [phone]  F - [phone]     Progress Note          PATIENT  DEMOGRAPHICS   PATIENT NAME: Yamileth Slater                      PHYSICIAN: Ace Lauren MD  : 1960  MRN: [id number]   LOS: 0 days    Patient Care Team:  Rianna Macias MD as PCP - General (Family Medicine)  Subjective   SUBJECTIVE   Seen during hd - bp is stable . Makes reasonable urine         Objective   OBJECTIVE   Vital Signs  Temp:  [98.2 °F (36.8 °C)-101.2 °F (38.4 °C)] 99.1 °F (37.3 °C)  Heart Rate:  [] 92  Resp:  [16-24] 21  BP: (119-211)/(55-95) 140/56    Flowsheet Rows      First Filed Value   Admission Height 167.6 cm (66\") Documented at 2022 1304   Admission Weight 132 kg (291 lb 9.6 oz) Documented at 2022 1048           I/O last 3 completed shifts:  In: 3270 [I.V.:2528; IV Piggyback:742]  Out: 1285 [Urine:1285]    PHYSICAL EXAM    Physical Exam  Constitutional:       Appearance: She is well-developed.   HENT:      Head: Normocephalic.   Eyes:      Pupils: Pupils are equal, round, and reactive to light.   Cardiovascular:      Rate and Rhythm: Normal rate and regular rhythm.      Heart sounds: Normal heart " sounds.   Pulmonary:      Effort: Pulmonary effort is normal.      Breath sounds: Normal breath sounds.   Abdominal:      General: Bowel sounds are normal.      Palpations: Abdomen is soft.   Musculoskeletal:         General: No swelling.   Skin:     Coloration: Skin is not jaundiced.   Neurological:      Mental Status: She is alert. She is disoriented.         RESULTS   Results Review:    Results from last 7 days   Lab Units 01/10/22  0820 01/10/22  0419 01/09/22  2357 01/09/22  1618 01/09/22  1214   SODIUM mmol/L 138 141 139   < > 129*   POTASSIUM mmol/L 2.8* 3.1* 2.9*   < > 4.0   CHLORIDE mmol/L 103 105 101   < > 87*   CO2 mmol/L 23.0 22.0 21.0*   < > 18.0*   BUN mg/dL 51* 52* 49*   < > 44*   CREATININE mg/dL 3.90* 4.01* 3.93*   < > 3.46*   CALCIUM mg/dL 8.8 8.9 8.9   < > 9.0   BILIRUBIN mg/dL  --   --   --   --  0.2   ALK PHOS U/L  --   --   --   --  192*   ALT (SGPT) U/L  --   --   --   --  7   AST (SGOT) U/L  --   --   --   --  10   GLUCOSE mg/dL 136* 203* 311*   < > 800*    < > = values in this interval not displayed.       Estimated Creatinine Clearance: 20.4 mL/min (A) (by C-G formula based on SCr of 3.9 mg/dL (H)).    Results from last 7 days   Lab Units 01/10/22  0820 01/10/22  0419 01/09/22  2357 01/09/22  1618 01/09/22  1214   MAGNESIUM mg/dL  --   --   --   --  1.9   PHOSPHORUS mg/dL 3.2 3.0 2.7   < > 6.1*    < > = values in this interval not displayed.             Results from last 7 days   Lab Units 01/10/22  0419 01/09/22  1214   WBC 10*3/mm3 11.34* 17.78*   HEMOGLOBIN g/dL 8.5* 7.0*   PLATELETS 10*3/mm3 259 423       Results from last 7 days   Lab Units 01/09/22  1214   INR  1.10         Imaging Results (Last 24 Hours)     Procedure Component Value Units Date/Time    US Guidance PICC NC [[id number]] Resulted: 01/09/22 1251     Updated: 01/09/22 1251    Narrative:      This procedure was auto-finalized with no dictation required.    XR Chest Post CVA Port [[id number]] Collected: 01/09/22 1220      Updated: 01/09/22 1236    Narrative:        PORTABLE CHEST    HISTORY: PICC line placement    Portable AP upright film of the chest was obtained at 12:05 PM.  COMPARISON: 10:36 AM    FINDINGS:   Right PICC line now in place with tip projected over the proximal  superior vena cava.  Right internal jugular multilumen catheter remains in place with  tip projected over the proximal superior vena cava.  Sternotomy.  Cardiomegaly with minimal pulmonary vascular congestion and  interstitial edema.  Perhaps very small right pleural effusion.  Old granulomatous disease.  No pneumothorax.  No acute osseous abnormality.  Degenerative changes are present in the thoracic spine.      Impression:      CONCLUSION:  Right PICC line now in place with tip projected over the proximal  superior vena cava.  Right internal jugular multilumen catheter remains in place with  tip projected over the proximal superior vena cava.  Sternotomy.  Cardiomegaly with minimal pulmonary vascular congestion and  interstitial edema.  Perhaps very small right pleural effusion.    [id number]    Electronically signed by:  Jon Patricia MD  1/9/2022 12:35 PM CST  Workstation: [employer] PICC W Imaging Guidance [[id number]] Resulted: 01/09/22 1227     Updated: 01/09/22 1227    Narrative:      This procedure was auto-finalized with no dictation required.    XR Chest 1 View [[id number]] Collected: 01/09/22 1100     Updated: 01/09/22 1113    Narrative:      EXAM: XR CHEST 1 VIEW    COMPARISONS: Radiograph 12/27/2021    INDICATION: AMS    FINDINGS:  Frontal view of the chest.    Lungs are symmetric and adequately expanded. No focal  consolidation, effusion, or pneumothorax. No change in mildly  prominent central pulmonary vasculature and interstitial  markings. Stable cardiomegaly. No acute osseous abnormalities.  Sternotomy wires are again seen. Right IJ dialysis catheter is  seen.      Impression:      Unchanged cardiomegaly with probable mild pulmonary  edema.    Electronically signed by:  Kamar Khanna MD  1/9/2022  11:12 AM CST Workstation: [id number]    CT Head Without Contrast [[id number]] Collected: 01/09/22 1035     Updated: 01/09/22 1053    Narrative:      EXAM: CT HEAD WITHOUT IV CONTRAST    COMPARISONS: CT head 3/31/2021    INDICATION: AMS    TECHNIQUE: Noncontrast CT images were obtained of the brain.  Reformats were provided. All CT scans at this facility uses dose  modulation, iterative reconstruction, and/or weight-based dosing  when appropriate to achieve a radiation dose as low as reasonably  achievable.    FINDINGS:    No intracranial hemorrhage, appreciable mass, mass effect or  midline shift.     There is preservation of the gray-white matter differentiation.  No dense MCA or insular ribbon sign.    There is cerebral volume loss with ex vacuo ventricular  dilatation. Confluent and patchy periventricular hypodensities  are likely sequela of chronic ischemic microvascular disease.    Calvarium is intact. Paranasal sinuses are unremarkable. Mastoid  air cells are clear. Orbits are unremarkable.      Impression:      No acute intracranial process.    Unchanged sequela of chronic ischemic microvascular disease and  cerebral volume loss.    Electronically signed by:  Kamar Khanna MD  1/9/2022  10:52 AM CST Workstation: [id number]           MEDICATIONS    aspirin, 81 mg, Oral, Daily  atorvastatin, 20 mg, Oral, Daily  bumetanide, 2 mg, Oral, Once per day on Sun Tue Thu Sat  clopidogrel, 75 mg, Oral, Daily  gabapentin, 300 mg, Oral, Q8H  heparin (porcine), 5,000 Units, Subcutaneous, Q8H  insulin aspart, 0-9 Units, Subcutaneous, TID AC  insulin aspart, 30 Units, Subcutaneous, TID With Meals  insulin detemir, 30 Units, Subcutaneous, Q12H  [START ON 1/11/2022] insulin detemir, 60 Units, Subcutaneous, Nightly  ipratropium-albuterol, 3 mL, Nebulization, 4x Daily - RT  isosorbide mononitrate, 30 mg, Oral, QAM  levothyroxine, 25 mcg, Oral,  Daily  lisinopril, 5 mg, Oral, Daily  metoprolol tartrate, 50 mg, Oral, Q12H  montelukast, 10 mg, Oral, Nightly  nystatin, , Topical, Q12H  oxybutynin XL, 5 mg, Oral, Daily  pantoprazole, 40 mg, Intravenous, Q AM  piperacillin-tazobactam, 3.375 g, Intravenous, Q12H  ranolazine, 500 mg, Oral, Q12H  senna-docusate sodium, 2 tablet, Oral, BID  sodium chloride, 10 mL, Intravenous, Q12H  sodium chloride, 3 mL, Intravenous, Q12H      O2, 3 L/min, Last Rate: 3 L/min (01/09/22 1809)  Pharmacy to dose vancomycin,   Pharmacy to Dose Zosyn,   sodium chloride, 10 mL/hr        Assessment/Plan   ASSESSMENT / PLAN      Type 2 diabetes mellitus with hyperosmolar hyperglycemic state (HHS) ([facility])    Hypertension    COPD (chronic obstructive pulmonary disease) ([facility])    Coronary artery disease    Hypothyroidism    MIKE and COPD overlap syndrome ([facility])    Unspecified diastolic (congestive) heart failure ([facility])    Anemia due to chronic kidney disease, on chronic dialysis ([facility])    Personal history of noncompliance with medical treatment, presenting hazards to health    SIRS (systemic inflammatory response syndrome) ([facility])    Altered mental status    1.  ESRD on HD- Initiated HD on 10/21 due to hyperkalemia and fluid overload, now likely ESRD.  HD MWF for now.  hd today. 4 k bath. uf 1.5L today      2.  Hyponatremia- Na 129 in the setting of severe hyperglycemia, hyperosmolar hyponatremia. Now better with glucose correction     3.  HHS-started on insulin drip, managed per primary team. Off insulin drip     4.  History of CAD     5.  History of COPD - On trilogy at night but did not use it last night     6.  Anemia / previous GI bleed / b12 deficiency-  s/p capsule endoscopy which showed few small non-bleeding intestinal AVMs and single small polyp. Will likely require transfusion soon as her Hgb is 7 and will likely dilute further.     7.  HTN- Continue home antihypertensives when she can tolerate oral meds.                 This document has  been electronically signed by Ace Lauren MD on January 10, 2022 10:15 CST           Electronically signed by Ace Lauren MD at 01/10/22 1023       Medical Student Notes (last 24 hours)  Notes from 22 1225 through 01/10/22 1225   No notes of this type exist for this encounter.            Consult Notes (last 24 hours)      Ace Lauren MD at 22 1442          [facility]  [address] [place] [address]. [address]  T - [phone]  F - [phone]     Consultation         PATIENT  DEMOGRAPHICS   PATIENT NAME: Yamileth Slaetr                      PHYSICIAN: BRIDGETT Hadley  : 1960  MRN: [id number]    Subjective   SUBJECTIVE   Referring Provider: Dr. Morillo  Reason for Consultation: ESRD on HD  History of present illness:  This is a 61-year-old female with a past medical history significant for COPD, CKD/ESRD, CAD, DM 2, hypertension, morbid obesity, PVD who presented to the hospital with altered mental status. Her  reports that she was at her baseline last night but was severely altered and lethargic this morning when he awoke. On evaluation here she is found to be in DKA or HHS and is being admitted for further management. Nephrology is consulted to manage ESRD on HD.    Past Medical History:   Diagnosis Date   • Acute blood loss anemia 2017    Likely due to gastric oozing at this time. - Dr. Duarte (GI) was consulted and has now signed off, will follow up outpatient - pill colonoscopy showed AVMs - continue to monitor   • Anxiety    • Carotid artery stenosis    • Chronic obstructive lung disease (HCC)    • CKD (chronic kidney disease) stage 4, GFR 15-29 ml/min (HCC)    • Colonic polyp    • Coronary arteriosclerosis    • Diabetes mellitus (HCC)    • Diabetic neuropathy (HCC)    • Ear pain, right 10/18/2021    - canal trauma due to patient scratching and DMT2 - added cortisporin ear drops   • Elevated troponin 10/12/2021    -most likely from CKD  -Trending down -Neg chest pain   • GERD (gastroesophageal reflux disease)    • GI bleed 5/13/2021    - GI will follow up outpatient - Protonix 40mg daily - Avoid medical DVT prophy and use mechanical at this time instead. - Continue to monitor - pill colonoscopy results showed AVMs   • History of transfusion    • Hypercholesterolemia    • Hypertension    • Hypomagnesemia 6/27/2021    Monitor and replace   • Morbid obesity (HCC)    • Nephrolithiasis    • Peripheral vascular disease (HCC)    • Sleep apnea    • Substance abuse (HCC)    • Vitamin D deficiency      Past Surgical History:   Procedure Laterality Date   • CARDIAC CATHETERIZATION N/A 7/14/2020   • CARDIAC CATHETERIZATION N/A 4/23/2021    Procedure: Left Heart Cath;  Surgeon: Melba Romo MD;  Location: [facility] CATH INVASIVE LOCATION;  Service: Cardiology;  Laterality: N/A;   • CARDIAC CATHETERIZATION N/A 4/30/2021    Procedure: Percutaneous Coronary Intervention;  Surgeon: Russell Voss MD;  Location: [facility] CATH INVASIVE LOCATION;  Service: Cardiovascular;  Laterality: N/A;   • CARDIAC CATHETERIZATION N/A 4/30/2021    Procedure: Stent NIKKI coronary;  Surgeon: Russell Voss MD;  Location: [facility] CATH INVASIVE LOCATION;  Service: Cardiovascular;  Laterality: N/A;   • CARDIAC CATHETERIZATION Left 11/13/2021    Procedure: Left Heart Cath;  Surgeon: Niall Rios MD;  Location: [facility] CATH INVASIVE LOCATION;  Service: Cardiology;  Laterality: Left;   • CAROTID STENT Left    • COLONOSCOPY     • COLONOSCOPY N/A 5/14/2021    Procedure: COLONOSCOPY;  Surgeon: Mingo Duarte MD;  Location: [facility] ENDOSCOPY;  Service: Gastroenterology;  Laterality: N/A;   • CORONARY ARTERY BYPASS GRAFT N/A 2013    CABG X 3   • CYSTOSCOPY BLADDER STONE LITHOTRIPSY Bilateral    • ENDOSCOPY N/A 4/12/2021    Procedure: ESOPHAGOGASTRODUODENOSCOPY;  Surgeon: Mingo Duarte MD;  Location: [facility] ENDOSCOPY;  Service: Gastroenterology;  Laterality: N/A;   •  "ENDOSCOPY N/A 5/14/2021    Procedure: ESOPHAGOGASTRODUODENOSCOPY;  Surgeon: Mingo Duarte MD;  Location: [facility] ENDOSCOPY;  Service: Gastroenterology;  Laterality: N/A;   • INTERVENTIONAL RADIOLOGY PROCEDURE N/A 10/21/2021    Procedure: tunneled central venous catheter placement;  Surgeon: Donnie Robles MD;  Location: [facility] ANGIO INVASIVE LOCATION;  Service: Interventional Radiology;  Laterality: N/A;     Family History   Problem Relation Age of Onset   • Heart disease Mother    • Lung cancer Mother    • Heart disease Father    • Heart attack Father    • Diabetes Father    • Heart disease Half-Sister         Dad's side   • Heart disease Brother    • No Known Problems Sister    • No Known Problems Sister    • No Known Problems Sister    • No Known Problems Sister    • No Known Problems Sister    • Pancreatic cancer Half-Sister         Dad's side   • No Known Problems Brother    • No Known Problems Brother    • Heart attack Half-Brother    • Heart attack Half-Brother    • No Known Problems Maternal Grandmother    • No Known Problems Maternal Grandfather    • No Known Problems Paternal Grandmother    • No Known Problems Paternal Grandfather      Social History     Tobacco Use   • Smoking status: Former Smoker     Packs/day: 0.25     Years: 46.00     Pack years: 11.50     Types: Cigarettes   • Smokeless tobacco: Never Used   • Tobacco comment: only smoking 5 a day - quit april 23 2021   Substance Use Topics   • Alcohol use: No   • Drug use: Not Currently     Types: LSD, Marijuana, Methamphetamines     Allergies:  Adhesive tape and Other     REVIEW OF SYSTEMS    Review of Systems   Unable to perform ROS: Mental status change       Objective   OBJECTIVE   Vital Signs  Temp:  [98.2 °F (36.8 °C)] 98.2 °F (36.8 °C)  Heart Rate:  [102-108] 106  Resp:  [16-24] 16  BP: (168-211)/(69-95) 168/69    Flowsheet Rows      First Filed Value   Admission Height 167.6 cm (66\") Documented at 01/09/2022 1304   Admission " Weight 132 kg (291 lb 9.6 oz) Documented at 01/09/2022 1048           No intake/output data recorded.    PHYSICAL EXAM    Physical Exam  Constitutional:       General: She is in acute distress.      Appearance: She is well-developed. She is ill-appearing.   HENT:      Head: Normocephalic and atraumatic.   Eyes:      Conjunctiva/sclera: Conjunctivae normal.      Pupils: Pupils are equal, round, and reactive to light.   Cardiovascular:      Rate and Rhythm: Normal rate and regular rhythm.   Pulmonary:      Effort: Pulmonary effort is normal.      Breath sounds: Normal breath sounds.   Abdominal:      Palpations: Abdomen is soft.   Musculoskeletal:      Cervical back: Neck supple.      Right lower leg: No edema.      Left lower leg: No edema.   Skin:     General: Skin is warm and dry.   Neurological:      Mental Status: She is disoriented and confused.   Psychiatric:         Mood and Affect: Mood normal.         Behavior: Behavior normal.         RESULTS   Results Review:    Results from last 7 days   Lab Units 01/09/22  1214   SODIUM mmol/L 129*   POTASSIUM mmol/L 4.0   CHLORIDE mmol/L 87*   CO2 mmol/L 18.0*   BUN mg/dL 44*   CREATININE mg/dL 3.46*   CALCIUM mg/dL 9.0   BILIRUBIN mg/dL 0.2   ALK PHOS U/L 192*   ALT (SGPT) U/L 7   AST (SGOT) U/L 10   GLUCOSE mg/dL 800*       Estimated Creatinine Clearance: 23.5 mL/min (A) (by C-G formula based on SCr of 3.46 mg/dL (H)).    Results from last 7 days   Lab Units 01/09/22  1214   MAGNESIUM mg/dL 1.9             Results from last 7 days   Lab Units 01/09/22  1214   WBC 10*3/mm3 17.78*   HEMOGLOBIN g/dL 7.0*   PLATELETS 10*3/mm3 423       Results from last 7 days   Lab Units 01/09/22  1214   INR  1.10        MEDICATIONS    piperacillin-tazobactam, 3.375 g, Intravenous, Once  vancomycin, 2,500 mg, Intravenous, Once      insulin, 8 Units/hr      (Not in a hospital admission)    Assessment/Plan   ASSESSMENT / PLAN      Diabetic acidosis (HCC)    1.  ESRD on HD- Initiated HD on  10/21 due to hyperkalemia and fluid overload, now likely ESRD.  HD MWF for now.  Next HD tomorrow. No urgent indication for HD today.      2.  Hyponatremia- Na 129 in the setting of severe hyperglycemia, hyperosmolar hyponatremia.     3.  HHS-started on insulin drip, managed per primary team. Pt has received ivf bolus in ER. Would suggest 50ml/h of ivf if needed bc of lack of osmotic diuresis in ESRD patient.      4.  History of CAD     5.  History of COPD - On trilogy at night but did not use it last night     6.  Anemia / previous GI bleed / b12 deficiency-  s/p capsule endoscopy which showed few small non-bleeding intestinal AVMs and single small polyp. Will likely require transfusion soon as her Hgb is 7 and will likely dilute further.     7.  HTN- Continue home antihypertensives when she can tolerate oral meds. Add PRN IV antihypertensives for now.    Thank you for the consult, we will continue to follow this patient.         I discussed the patients findings and my recommendations with family and primary care team      This document has been electronically signed by BRIDGETT Hadley on January 9, 2022 14:42 CST      For this patient encounter, I have reviewed the Nurse Practitioner's documentation, medical decision making, and treatment plan and personally spent time with the patient.           Electronically signed by Ace Lauren MD at 01/09/22 [id number]

## 2022-01-11 ENCOUNTER — HOME CARE VISIT (OUTPATIENT)
Dept: HOME HEALTH SERVICES | Facility: HOME HEALTHCARE | Age: 62
End: 2022-01-11

## 2022-01-11 PROBLEM — E11.29 TYPE 2 DIABETES MELLITUS WITH KIDNEY COMPLICATION, WITH LONG-TERM CURRENT USE OF INSULIN: Status: ACTIVE | Noted: 2022-01-11

## 2022-01-11 PROBLEM — R65.10 SIRS (SYSTEMIC INFLAMMATORY RESPONSE SYNDROME): Status: RESOLVED | Noted: 2022-01-09 | Resolved: 2022-01-11

## 2022-01-11 PROBLEM — R41.82 ALTERED MENTAL STATUS: Status: RESOLVED | Noted: 2022-01-09 | Resolved: 2022-01-11

## 2022-01-11 PROBLEM — E11.00 TYPE 2 DIABETES MELLITUS WITH HYPEROSMOLAR HYPERGLYCEMIC STATE (HHS): Status: RESOLVED | Noted: 2017-01-27 | Resolved: 2022-01-11

## 2022-01-11 PROBLEM — Z79.4 TYPE 2 DIABETES MELLITUS WITH KIDNEY COMPLICATION, WITH LONG-TERM CURRENT USE OF INSULIN: Status: ACTIVE | Noted: 2022-01-11

## 2022-01-11 LAB
ANION GAP SERPL CALCULATED.3IONS-SCNC: 15 MMOL/L (ref 5–15)
BUN SERPL-MCNC: 43 MG/DL (ref 8–23)
BUN/CREAT SERPL: 9.7 (ref 7–25)
CALCIUM SPEC-SCNC: 8.4 MG/DL (ref 8.6–10.5)
CHLORIDE SERPL-SCNC: 100 MMOL/L (ref 98–107)
CO2 SERPL-SCNC: 25 MMOL/L (ref 22–29)
CREAT SERPL-MCNC: 4.43 MG/DL (ref 0.57–1)
DEPRECATED RDW RBC AUTO: 53.9 FL (ref 37–54)
ERYTHROCYTE [DISTWIDTH] IN BLOOD BY AUTOMATED COUNT: 16.1 % (ref 12.3–15.4)
GFR SERPL CREATININE-BSD FRML MDRD: 10 ML/MIN/1.73
GFR SERPL CREATININE-BSD FRML MDRD: ABNORMAL ML/MIN/{1.73_M2}
GLUCOSE BLDC GLUCOMTR-MCNC: 115 MG/DL (ref 70–130)
GLUCOSE BLDC GLUCOMTR-MCNC: 145 MG/DL (ref 70–130)
GLUCOSE BLDC GLUCOMTR-MCNC: 148 MG/DL (ref 70–130)
GLUCOSE BLDC GLUCOMTR-MCNC: 168 MG/DL (ref 70–130)
GLUCOSE BLDC GLUCOMTR-MCNC: 200 MG/DL (ref 70–130)
GLUCOSE BLDC GLUCOMTR-MCNC: 213 MG/DL (ref 70–130)
GLUCOSE BLDC GLUCOMTR-MCNC: 229 MG/DL (ref 70–130)
GLUCOSE BLDC GLUCOMTR-MCNC: 308 MG/DL (ref 70–130)
GLUCOSE SERPL-MCNC: 193 MG/DL (ref 65–99)
HCT VFR BLD AUTO: 28.8 % (ref 34–46.6)
HGB BLD-MCNC: 8.9 G/DL (ref 12–15.9)
MCH RBC QN AUTO: 28.3 PG (ref 26.6–33)
MCHC RBC AUTO-ENTMCNC: 30.9 G/DL (ref 31.5–35.7)
MCV RBC AUTO: 91.7 FL (ref 79–97)
PHOSPHATE SERPL-MCNC: 4.3 MG/DL (ref 2.5–4.5)
PHOSPHATE SERPL-MCNC: 4.7 MG/DL (ref 2.5–4.5)
PHOSPHATE SERPL-MCNC: 4.7 MG/DL (ref 2.5–4.5)
PHOSPHATE SERPL-MCNC: 5.4 MG/DL (ref 2.5–4.5)
PHOSPHATE SERPL-MCNC: 5.5 MG/DL (ref 2.5–4.5)
PHOSPHATE SERPL-MCNC: 5.8 MG/DL (ref 2.5–4.5)
PLATELET # BLD AUTO: 226 10*3/MM3 (ref 140–450)
PMV BLD AUTO: 9 FL (ref 6–12)
POTASSIUM SERPL-SCNC: 3.5 MMOL/L (ref 3.5–5.2)
RBC # BLD AUTO: 3.14 10*6/MM3 (ref 3.77–5.28)
SODIUM SERPL-SCNC: 140 MMOL/L (ref 136–145)
WBC NRBC COR # BLD: 11.07 10*3/MM3 (ref 3.4–10.8)

## 2022-01-11 PROCEDURE — 85027 COMPLETE CBC AUTOMATED: CPT | Performed by: NURSE PRACTITIONER

## 2022-01-11 PROCEDURE — 80048 BASIC METABOLIC PNL TOTAL CA: CPT | Performed by: NURSE PRACTITIONER

## 2022-01-11 PROCEDURE — 82962 GLUCOSE BLOOD TEST: CPT

## 2022-01-11 PROCEDURE — 84100 ASSAY OF PHOSPHORUS: CPT | Performed by: STUDENT IN AN ORGANIZED HEALTH CARE EDUCATION/TRAINING PROGRAM

## 2022-01-11 PROCEDURE — 63710000001 INSULIN DETEMIR PER 5 UNITS: Performed by: STUDENT IN AN ORGANIZED HEALTH CARE EDUCATION/TRAINING PROGRAM

## 2022-01-11 PROCEDURE — 94799 UNLISTED PULMONARY SVC/PX: CPT

## 2022-01-11 PROCEDURE — 99231 SBSQ HOSP IP/OBS SF/LOW 25: CPT | Performed by: STUDENT IN AN ORGANIZED HEALTH CARE EDUCATION/TRAINING PROGRAM

## 2022-01-11 PROCEDURE — 25010000002 PIPERACILLIN SOD-TAZOBACTAM PER 1 G: Performed by: CHIROPRACTOR

## 2022-01-11 PROCEDURE — 63710000001 INSULIN ASPART PER 5 UNITS: Performed by: STUDENT IN AN ORGANIZED HEALTH CARE EDUCATION/TRAINING PROGRAM

## 2022-01-11 PROCEDURE — 94760 N-INVAS EAR/PLS OXIMETRY 1: CPT

## 2022-01-11 PROCEDURE — 36415 COLL VENOUS BLD VENIPUNCTURE: CPT | Performed by: STUDENT IN AN ORGANIZED HEALTH CARE EDUCATION/TRAINING PROGRAM

## 2022-01-11 PROCEDURE — 25010000002 HEPARIN (PORCINE) PER 1000 UNITS: Performed by: STUDENT IN AN ORGANIZED HEALTH CARE EDUCATION/TRAINING PROGRAM

## 2022-01-11 PROCEDURE — 92610 EVALUATE SWALLOWING FUNCTION: CPT | Performed by: SPEECH-LANGUAGE PATHOLOGIST

## 2022-01-11 RX ORDER — BUMETANIDE 1 MG/1
2 TABLET ORAL
Status: DISCONTINUED | OUTPATIENT
Start: 2022-01-13 | End: 2022-01-20 | Stop reason: HOSPADM

## 2022-01-11 RX ADMIN — RANOLAZINE 500 MG: 500 TABLET, FILM COATED, EXTENDED RELEASE ORAL at 20:37

## 2022-01-11 RX ADMIN — HEPARIN SODIUM 5000 UNITS: 5000 INJECTION INTRAVENOUS; SUBCUTANEOUS at 13:00

## 2022-01-11 RX ADMIN — NYSTATIN: 100000 POWDER TOPICAL at 22:04

## 2022-01-11 RX ADMIN — RANOLAZINE 500 MG: 500 TABLET, FILM COATED, EXTENDED RELEASE ORAL at 08:07

## 2022-01-11 RX ADMIN — GABAPENTIN 300 MG: 300 CAPSULE ORAL at 13:00

## 2022-01-11 RX ADMIN — PANTOPRAZOLE SODIUM 40 MG: 40 INJECTION, POWDER, FOR SOLUTION INTRAVENOUS at 06:48

## 2022-01-11 RX ADMIN — HEPARIN SODIUM 5000 UNITS: 5000 INJECTION INTRAVENOUS; SUBCUTANEOUS at 06:48

## 2022-01-11 RX ADMIN — NYSTATIN: 100000 POWDER TOPICAL at 08:07

## 2022-01-11 RX ADMIN — IPRATROPIUM BROMIDE AND ALBUTEROL SULFATE 3 ML: 2.5; .5 SOLUTION RESPIRATORY (INHALATION) at 08:15

## 2022-01-11 RX ADMIN — BUMETANIDE 1 MG: 1 TABLET ORAL at 08:07

## 2022-01-11 RX ADMIN — ATORVASTATIN CALCIUM 20 MG: 20 TABLET, FILM COATED ORAL at 08:07

## 2022-01-11 RX ADMIN — CLOPIDOGREL 75 MG: 75 TABLET, FILM COATED ORAL at 08:07

## 2022-01-11 RX ADMIN — GABAPENTIN 300 MG: 300 CAPSULE ORAL at 20:37

## 2022-01-11 RX ADMIN — INSULIN ASPART 30 UNITS: 100 INJECTION, SOLUTION INTRAVENOUS; SUBCUTANEOUS at 08:08

## 2022-01-11 RX ADMIN — SODIUM CHLORIDE, PRESERVATIVE FREE 10 ML: 5 INJECTION INTRAVENOUS at 08:12

## 2022-01-11 RX ADMIN — SODIUM CHLORIDE, PRESERVATIVE FREE 10 ML: 5 INJECTION INTRAVENOUS at 20:39

## 2022-01-11 RX ADMIN — PIPERACILLIN SODIUM AND TAZOBACTAM SODIUM 3.38 G: 3; .375 INJECTION, POWDER, LYOPHILIZED, FOR SOLUTION INTRAVENOUS at 03:16

## 2022-01-11 RX ADMIN — LEVOTHYROXINE SODIUM 25 MCG: 25 TABLET ORAL at 08:07

## 2022-01-11 RX ADMIN — IPRATROPIUM BROMIDE AND ALBUTEROL SULFATE 3 ML: 2.5; .5 SOLUTION RESPIRATORY (INHALATION) at 10:30

## 2022-01-11 RX ADMIN — DOCUSATE SODIUM 50 MG AND SENNOSIDES 8.6 MG 2 TABLET: 8.6; 5 TABLET, FILM COATED ORAL at 08:07

## 2022-01-11 RX ADMIN — OXYBUTYNIN CHLORIDE 5 MG: 5 TABLET, EXTENDED RELEASE ORAL at 08:07

## 2022-01-11 RX ADMIN — INSULIN ASPART 2 UNITS: 100 INJECTION, SOLUTION INTRAVENOUS; SUBCUTANEOUS at 12:57

## 2022-01-11 RX ADMIN — HEPARIN SODIUM 5000 UNITS: 5000 INJECTION INTRAVENOUS; SUBCUTANEOUS at 20:37

## 2022-01-11 RX ADMIN — INSULIN DETEMIR 60 UNITS: 100 INJECTION, SOLUTION SUBCUTANEOUS at 21:07

## 2022-01-11 RX ADMIN — INSULIN ASPART 30 UNITS: 100 INJECTION, SOLUTION INTRAVENOUS; SUBCUTANEOUS at 12:57

## 2022-01-11 RX ADMIN — GABAPENTIN 300 MG: 300 CAPSULE ORAL at 06:48

## 2022-01-11 RX ADMIN — DOCUSATE SODIUM 50 MG AND SENNOSIDES 8.6 MG 2 TABLET: 8.6; 5 TABLET, FILM COATED ORAL at 20:37

## 2022-01-11 RX ADMIN — MONTELUKAST 10 MG: 10 TABLET, FILM COATED ORAL at 20:37

## 2022-01-11 RX ADMIN — INSULIN ASPART 30 UNITS: 100 INJECTION, SOLUTION INTRAVENOUS; SUBCUTANEOUS at 18:18

## 2022-01-11 RX ADMIN — IPRATROPIUM BROMIDE AND ALBUTEROL SULFATE 3 ML: 2.5; .5 SOLUTION RESPIRATORY (INHALATION) at 14:05

## 2022-01-11 RX ADMIN — ASPIRIN 81 MG: 81 TABLET, FILM COATED ORAL at 08:07

## 2022-01-11 RX ADMIN — IPRATROPIUM BROMIDE AND ALBUTEROL SULFATE 3 ML: 2.5; .5 SOLUTION RESPIRATORY (INHALATION) at 19:16

## 2022-01-11 RX ADMIN — INSULIN ASPART 4 UNITS: 100 INJECTION, SOLUTION INTRAVENOUS; SUBCUTANEOUS at 06:54

## 2022-01-11 NOTE — PLAN OF CARE
Patient oriented to person, speech becoming clearer throughout shift. HR NSR on monitor, BP occasionally soft but adequate. O2 weaned to RA while awake, on bipap when sleeping.  Meds administered as seen on MAR, bedside swallow eval performed, patient tolerated. Inadequate UOP noted via knowles catheter, patient on HD 3X weekly, urine throughout shift turned brown, thick, purulent appearing, knowles catheter flushed multiple times.  Fall and safety precautions remain intact.       Problem: Fall Injury Risk  Goal: Absence of Fall and Fall-Related Injury  Outcome: Ongoing, Progressing  Intervention: Identify and Manage Contributors to Fall Injury Risk  Recent Flowsheet Documentation  Taken 1/10/2022 2030 by Angela Tubbs RN  Medication Review/Management: medications reviewed  Intervention: Promote Injury-Free Environment  Recent Flowsheet Documentation  Taken 1/10/2022 2030 by Angela Tubbs RN  Safety Promotion/Fall Prevention:  • clutter free environment maintained  • fall prevention program maintained  • lighting adjusted  • room organization consistent  • safety round/check completed     Problem: Skin Injury Risk Increased  Goal: Skin Health and Integrity  Outcome: Ongoing, Progressing  Intervention: Optimize Skin Protection  Recent Flowsheet Documentation  Taken 1/10/2022 2030 by Angela Tubbs RN  Pressure Reduction Techniques:  • weight shift assistance provided  • pressure points protected  • heels elevated off bed  Head of Bed (HOB): HOB at 45 degrees  Pressure Reduction Devices:  • pressure-redistributing mattress utilized  • positioning supports utilized  • heel offloading device utilized  Skin Protection:  • adhesive use limited  • incontinence pads utilized     Problem: Adult Inpatient Plan of Care  Goal: Plan of Care Review  Outcome: Ongoing, Progressing  Flowsheets (Taken 1/10/2022 2125)  Progress: improving  Plan of Care Reviewed With: patient  Goal: Patient-Specific Goal  (Individualized)  Outcome: Ongoing, Progressing  Goal: Absence of Hospital-Acquired Illness or Injury  Outcome: Ongoing, Progressing  Intervention: Identify and Manage Fall Risk  Recent Flowsheet Documentation  Taken 1/10/2022 2030 by Angela Tubbs RN  Safety Promotion/Fall Prevention:  • clutter free environment maintained  • fall prevention program maintained  • lighting adjusted  • room organization consistent  • safety round/check completed  Intervention: Prevent Skin Injury  Recent Flowsheet Documentation  Taken 1/10/2022 2030 by Angela Tubbs RN  Body Position:  • turned  • tilted, right  • lower extremity elevated, left  • lower extremity elevated, right  Skin Protection:  • adhesive use limited  • incontinence pads utilized  Intervention: Prevent and Manage VTE (venous thromboembolism) Risk  Recent Flowsheet Documentation  Taken 1/10/2022 2030 by Angela Tubbs RN  VTE Prevention/Management: (see MAR)  • bilateral  • sequential compression devices on  Intervention: Prevent Infection  Recent Flowsheet Documentation  Taken 1/10/2022 2030 by Angela Tubbs RN  Infection Prevention:  • environmental surveillance performed  • rest/sleep promoted  • personal protective equipment utilized  Goal: Optimal Comfort and Wellbeing  Outcome: Ongoing, Progressing  Goal: Readiness for Transition of Care  Outcome: Ongoing, Progressing     Problem: COPD Comorbidity  Goal: Maintenance of COPD Symptom Control  Outcome: Ongoing, Progressing  Intervention: Maintain COPD-Symptom Control  Recent Flowsheet Documentation  Taken 1/10/2022 2030 by Angela Tubbs RN  Medication Review/Management: medications reviewed     Problem: Diabetes Comorbidity  Goal: Blood Glucose Level Within Desired Range  Outcome: Ongoing, Progressing     Problem: Hypertension Comorbidity  Goal: Blood Pressure in Desired Range  Outcome: Ongoing, Progressing  Intervention: Maintain Hypertension-Management Strategies  Recent Flowsheet  Documentation  Taken 1/10/2022 2030 by Angela Tubbs RN  Medication Review/Management: medications reviewed     Problem: Obstructive Sleep Apnea Risk or Actual (Comorbidity Management)  Goal: Unobstructed Breathing During Sleep  Outcome: Ongoing, Progressing     Problem: Pain Chronic (Persistent) (Comorbidity Management)  Goal: Acceptable Pain Control and Functional Ability  Outcome: Ongoing, Progressing  Intervention: Manage Persistent Pain  Recent Flowsheet Documentation  Taken 1/10/2022 2030 by Angela Tubbs RN  Medication Review/Management: medications reviewed     Problem: Hyperosmolar Hyperglycemic State  Goal: Serum Glucose and Electrolytes Within Desired Range  Outcome: Ongoing, Progressing     Problem: Diabetic Ketoacidosis  Goal: Fluid and Electrolyte Balance with Absence of Ketosis  Outcome: Ongoing, Progressing     Problem: Confusion Acute  Goal: Optimal Cognitive Function  Outcome: Ongoing, Progressing   Goal Outcome Evaluation:  Plan of Care Reviewed With: patient        Progress: improving

## 2022-01-11 NOTE — THERAPY DISCHARGE NOTE
Acute Care - Speech Language Pathology   Swallow Initial Evaluation/Discharge [facility]     Patient Name: Yamileth Slater  : 1960  MRN: [id number]  Today's Date: 2022               Admit Date: 2022    Visit Dx:    ICD-10-CM ICD-9-CM   1. Diabetic ketoacidosis with coma associated with other specified diabetes mellitus ([facility])  E13.11 250.32   2. Hypertension, unspecified type  I10 401.9   3. Altered mental status, unspecified altered mental status type  R41.82 780.97   4. Dysphagia, unspecified type  R13.10 787.20     Patient Active Problem List   Diagnosis   • Chronic obstructive pulmonary disease ([facility])   • Chronic midline low back pain without sciatica   • Vitamin D deficiency   • Tobacco dependence syndrome   • Surgical follow-up care   • Shoulder joint pain   • Peripheral vascular disease ([facility])   • Pain   • Neurologic disorder associated with diabetes mellitus ([facility])   • Morbid obesity with BMI of 50.0-59.9, adult ([facility])   • Mixed hyperlipidemia   • Kidney stone   • History of colon polyps   • GERD (gastroesophageal reflux disease)   • Excoriated eczema   • Hypertension   • Encounter for medication refill   • Dyslipidemia   • Diabetes mellitus ([facility])   • COPD (chronic obstructive pulmonary disease) ([facility])   • Chronic folliculitis   • Coronary artery disease   • Mild persistent asthma   • 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   • Hypothyroidism   • Carotid artery disease ([facility])   • Current smoker   • [place] abuse   • PAD (peripheral artery disease) ([facility])   • Intercostal pain   • Venous insufficiency   • LAFB (left anterior fascicular block)   • Pulmonary hypertension ([facility])   • Left hip pain   • Neck pain   • CKD (chronic kidney disease), symptom management only, stage 5 ([facility])   • MIKE and COPD overlap syndrome ([facility])   • Pneumonia due to COVID-19 virus   • Hyperkalemia   • Cutaneous candidiasis   • Community acquired pneumonia of right middle lobe of lung   • MRSA infection    • Alkaline phosphatase elevation   • COVID-19 ruled out by laboratory testing   • Esophageal dysphagia   • Monilial esophagitis ([facility])   • NSTEMI, initial episode of care ([facility])   • Pneumonia due to infectious organism   • Cellulitis of left leg   • Unspecified diastolic (congestive) heart failure ([facility])   • Hyponatremia   • Urinary tract infection due to Proteus   • History of insertion of tunneled central venous catheter (CVC) with port   • Anemia due to chronic kidney disease, on chronic dialysis ([facility])   • Pneumonitis   • Personal history of noncompliance with medical treatment, presenting hazards to health   • Type 2 diabetes mellitus with kidney complication, with long-term current use of insulin ([facility])     Past Medical History:   Diagnosis Date   • Acute blood loss anemia 4/16/2017    Likely due to gastric oozing at this time. - Dr. Duarte (GI) was consulted and has now signed off, will follow up outpatient - pill colonoscopy showed AVMs - continue to monitor   • Altered mental status 1/9/2022    - AMS on presentation - initial ABG pH 7.3, CO2 34 - Procal 0.29 - UA negative for acute cysitits -CTA head wnl  - Empiric Zosyn and Vancomycin -Lactate 2.5 on admission  - blood cultures no growth at 24 hours     • Anxiety    • CAD (coronary artery disease) 4/24/2021    S/P 3 stents 5/1/2021 for [facility] Continue ASA 81mg & Clopidogrel 75mg Continue Atorvastatin 40mg   • Carotid artery stenosis    • Chronic obstructive lung disease ([facility])    • CKD (chronic kidney disease) stage 4, GFR 15-29 ml/min ([facility])    • Colonic polyp    • Coronary arteriosclerosis    • Diabetes mellitus ([facility])    • Diabetic neuropathy ([facility])    • Ear pain, right 10/18/2021    - canal trauma due to patient scratching and DMT2 - added cortisporin ear drops   • Elevated troponin 10/12/2021    -most likely from CKD -Trending down -Neg chest pain   • GERD (gastroesophageal reflux disease)    • GI bleed 5/13/2021    - GI will follow up outpatient - Protonix  40mg daily - Avoid medical DVT prophy and use mechanical at this time instead. - Continue to monitor - pill colonoscopy results showed AVMs   • History of transfusion    • Hypercholesterolemia    • Hypertension    • Hypomagnesemia 6/27/2021    Monitor and replace   • Morbid obesity (HCC)    • Nephrolithiasis    • Peripheral vascular disease (HCC)    • SIRS (systemic inflammatory response syndrome) ([facility]) 1/9/2022    Admission  - WBC 17.78   -   - RR 16 - 1/10: VSS/wnl - CXR - Mild pulm edema - Blood cultures no growth at 24 hours  - Procalcitonin 0.29 - UA : glucose 1000, negative Leucocytes/nitrate - Empiric Zosyn and Vancomcyin    • Sleep apnea    • Substance abuse ([facility])    • Vitamin D deficiency      Past Surgical History:   Procedure Laterality Date   • CARDIAC CATHETERIZATION N/A 7/14/2020   • CARDIAC CATHETERIZATION N/A 4/23/2021    Procedure: Left Heart Cath;  Surgeon: Melba Romo MD;  Location: [facility] CATH INVASIVE LOCATION;  Service: Cardiology;  Laterality: N/A;   • CARDIAC CATHETERIZATION N/A 4/30/2021    Procedure: Percutaneous Coronary Intervention;  Surgeon: Russell Voss MD;  Location: [facility] CATH INVASIVE LOCATION;  Service: Cardiovascular;  Laterality: N/A;   • CARDIAC CATHETERIZATION N/A 4/30/2021    Procedure: Stent NIKKI coronary;  Surgeon: Russell Voss MD;  Location: [facility] CATH INVASIVE LOCATION;  Service: Cardiovascular;  Laterality: N/A;   • CARDIAC CATHETERIZATION Left 11/13/2021    Procedure: Left Heart Cath;  Surgeon: Niall Rios MD;  Location: [facility] CATH INVASIVE LOCATION;  Service: Cardiology;  Laterality: Left;   • CAROTID STENT Left    • COLONOSCOPY     • COLONOSCOPY N/A 5/14/2021    Procedure: COLONOSCOPY;  Surgeon: Mingo Duarte MD;  Location: [facility] ENDOSCOPY;  Service: Gastroenterology;  Laterality: N/A;   • CORONARY ARTERY BYPASS GRAFT N/A 2013    CABG X 3   • CYSTOSCOPY BLADDER STONE LITHOTRIPSY Bilateral    • ENDOSCOPY N/A 4/12/2021     Procedure: ESOPHAGOGASTRODUODENOSCOPY;  Surgeon: Mingo Duarte MD;  Location: [facility] ENDOSCOPY;  Service: Gastroenterology;  Laterality: N/A;   • ENDOSCOPY N/A 5/14/2021    Procedure: ESOPHAGOGASTRODUODENOSCOPY;  Surgeon: Mingo Duarte MD;  Location: [facility] ENDOSCOPY;  Service: Gastroenterology;  Laterality: N/A;   • INTERVENTIONAL RADIOLOGY PROCEDURE N/A 10/21/2021    Procedure: tunneled central venous catheter placement;  Surgeon: Donnie Robles MD;  Location: [facility] ANGIO INVASIVE LOCATION;  Service: Interventional Radiology;  Laterality: N/A;       SLP Recommendation and Plan                                                                Plan of Care Reviewed With: patient (01/11/22 1513)  Progress: improving (01/11/22 1513)  Outcome Summary: Swallow evaluation completed. No s/s of aspiration on regular solids and regular liquids. Pt on regular diet and regular liquids. No tx recommended. RN aware. (01/11/22 1513)    SWALLOW EVALUATION (last 72 hours)     SLP Adult Swallow Evaluation     Row Name 01/11/22 1248                   Rehab Evaluation    Document Type evaluation  -EK        Subjective Information no complaints  -EK        Patient Observations alert; cooperative; agree to therapy  -EK        Patient Effort good  -EK                  General Information    Patient Profile Reviewed yes  -EK        Current Method of Nutrition regular textures; thin liquids  -EK        Precautions/Limitations, Vision corrective lenses needed for reading  -EK        Precautions/Limitations, Hearing WFL  -EK        Prior Level of Function-Communication WFL  -EK        Prior Level of Function-Swallowing no diet consistency restrictions  -EK                  Pain    Additional Documentation Pain Scale: Numbers Pre/Post-Treatment (Group)  -EK                  Pain Scale: Numbers Pre/Post-Treatment    Pretreatment Pain Rating 0/10 - no pain  -EK        Posttreatment Pain Rating 0/10 - no pain  -EK                   Oral Motor Structure and Function    Dentition Assessment natural, present and adequate  -EK        Secretion Management WNL/WFL  -EK        Mucosal Quality moist, healthy  -EK        Volitional Swallow WFL  -EK        Volitional Cough WFL  -EK                  Oral Musculature and Cranial Nerve Assessment    Oral Motor General Assessment WFL  -EK                  General Eating/Swallowing Observations    Eating/Swallowing Skills self-fed  -EK        Positioning During Eating upright 90 degree; upright in bed  -EK        Utensils Used spoon; straw  -EK        Pre SpO2 (%) 99  -EK        Post SpO2 (%) 99  -EK                  Clinical Swallow Eval    Oral Prep Phase WFL  -EK        Oral Transit WFL  -EK        Oral Residue WFL  -EK        Pharyngeal Phase WFL  -EK        Clinical Swallow Evaluation Summary Swallow evaluation completed. No s/s of aspiration on regular solids and regular liquids. Pt on regular diet and regular liquids. No tx recommended. RN aware.  -EK              User Key  (r) = Recorded By, (t) = Taken By, (c) = Cosigned By    Initials Name Effective Dates    Cintia Edwards CCC-SLP 06/16/21 -                 EDUCATION  The patient has been educated in the following areas:   Dysphagia (Swallowing Impairment).                 Time Calculation:    Time Calculation- SLP     Row Name 01/11/22 1514             Time Calculation- SLP    SLP Start Time 1248  -EK      SLP Stop Time 1314  -EK      SLP Time Calculation (min) 26 min  -EK      SLP Received On 01/11/22  -EK            User Key  (r) = Recorded By, (t) = Taken By, (c) = Cosigned By    Initials Name Provider Type    EK Cintia Barillas CCC-SLP Speech and Language Pathologist                Therapy Charges for Today     Code Description Service Date Service Provider Modifiers Qty    [id number]  ST EVAL ORAL PHARYNG SWALLOW 2 1/11/2022 Cintia Barillas CCC-JACKIE GN 1                    Cintia Hill  KILO Barillas-SLP  1/11/2022

## 2022-01-11 NOTE — PROGRESS NOTES
"[facility]  [address] [place] [address]. [address]  T - [phone]  F - [phone]     Progress Note          PATIENT  DEMOGRAPHICS   PATIENT NAME: Yamileth Slater                      PHYSICIAN: Ace Lauren MD  : 1960  MRN: [id number]   LOS: 1 day    Patient Care Team:  Rianna Macias MD as PCP - General (Family Medicine)  Subjective   SUBJECTIVE   More alert , no soa.          Objective   OBJECTIVE   Vital Signs  Temp:  [97.1 °F (36.2 °C)-99.1 °F (37.3 °C)] 98.4 °F (36.9 °C)  Heart Rate:  [] 85  Resp:  [12-22] 16  BP: ()/(46-59) 99/46    Flowsheet Rows      First Filed Value   Admission Height 167.6 cm (66\") Documented at 2022 1304   Admission Weight 132 kg (291 lb 9.6 oz) Documented at 2022 1048           I/O last 3 completed shifts:  In: 3120 [P.O.:250; I.V.:2528; IV Piggyback:342]  Out: 1965 [Urine:965; Other:1000]    PHYSICAL EXAM    Physical Exam  Constitutional:       Appearance: She is well-developed.   HENT:      Head: Normocephalic.   Eyes:      Pupils: Pupils are equal, round, and reactive to light.   Cardiovascular:      Rate and Rhythm: Normal rate and regular rhythm.      Heart sounds: Normal heart sounds.   Pulmonary:      Effort: Pulmonary effort is normal.      Breath sounds: Normal breath sounds.   Abdominal:      General: Bowel sounds are normal.      Palpations: Abdomen is soft.   Musculoskeletal:         General: No swelling.   Skin:     Coloration: Skin is not jaundiced.   Neurological:      Mental Status: She is alert. She is disoriented.         RESULTS   Results Review:    Results from last 7 days   Lab Units 22  0401 01/10/22  1214 01/10/22  0820 22  1618 22  1214   SODIUM mmol/L 140 132* 138   < > 129*   POTASSIUM mmol/L 3.5 3.2* 2.8*   < > 4.0   CHLORIDE mmol/L 100 97* 103   < > 87*   CO2 mmol/L 25.0 20.0* 23.0   < > 18.0*   BUN mg/dL 43* 32* 51*   < > 44*   CREATININE mg/dL 4.43* 2.91* 3.90*   < > " 3.46*   CALCIUM mg/dL 8.4* 8.1* 8.8   < > 9.0   BILIRUBIN mg/dL  --   --   --   --  0.2   ALK PHOS U/L  --   --   --   --  192*   ALT (SGPT) U/L  --   --   --   --  7   AST (SGOT) U/L  --   --   --   --  10   GLUCOSE mg/dL 193* 255* 136*   < > 800*    < > = values in this interval not displayed.       Estimated Creatinine Clearance: 17.6 mL/min (A) (by C-G formula based on SCr of 4.43 mg/dL (H)).    Results from last 7 days   Lab Units 01/11/22  1147 01/11/22  0738 01/11/22  0401 01/09/22  1618 01/09/22  1214   MAGNESIUM mg/dL  --   --   --   --  1.9   PHOSPHORUS mg/dL 4.7* 5.4* 4.7*   < > 6.1*    < > = values in this interval not displayed.             Results from last 7 days   Lab Units 01/11/22  0401 01/10/22  0419 01/09/22  1214   WBC 10*3/mm3 11.07* 11.34* 17.78*   HEMOGLOBIN g/dL 8.9* 8.5* 7.0*   PLATELETS 10*3/mm3 226 259 423       Results from last 7 days   Lab Units 01/09/22  1214   INR  1.10         Imaging Results (Last 24 Hours)     ** No results found for the last 24 hours. **           MEDICATIONS    aspirin, 81 mg, Oral, Daily  atorvastatin, 20 mg, Oral, Daily  bumetanide, 1 mg, Oral, BID  clopidogrel, 75 mg, Oral, Daily  gabapentin, 300 mg, Oral, Q8H  heparin (porcine), 5,000 Units, Subcutaneous, Q8H  insulin aspart, 0-9 Units, Subcutaneous, TID AC  insulin aspart, 30 Units, Subcutaneous, TID With Meals  insulin detemir, 60 Units, Subcutaneous, Nightly  ipratropium-albuterol, 3 mL, Nebulization, 4x Daily - RT  isosorbide mononitrate, 30 mg, Oral, QAM  levothyroxine, 25 mcg, Oral, Daily  lisinopril, 5 mg, Oral, Daily  metoprolol tartrate, 50 mg, Oral, Q12H  montelukast, 10 mg, Oral, Nightly  nystatin, , Topical, Q12H  oxybutynin XL, 5 mg, Oral, Daily  pantoprazole, 40 mg, Intravenous, Q AM  ranolazine, 500 mg, Oral, Q12H  senna-docusate sodium, 2 tablet, Oral, BID  sodium chloride, 10 mL, Intravenous, Q12H      O2, 3 L/min, Last Rate: 3 L/min (01/09/22 1809)  sodium chloride, 10  mL/hr        Assessment/Plan   ASSESSMENT / PLAN      Type 2 diabetes mellitus with kidney complication, with long-term current use of insulin (HCC)    Hypertension    COPD (chronic obstructive pulmonary disease) (HCC)    Coronary artery disease    Hypothyroidism    MIKE and COPD overlap syndrome (HCC)    Unspecified diastolic (congestive) heart failure (HCC)    Anemia due to chronic kidney disease, on chronic dialysis (HCC)    Personal history of noncompliance with medical treatment, presenting hazards to health    1.  ESRD on HD- Initiated HD on 10/21 due to hyperkalemia and fluid overload, now likely ESRD.  HD MWF for now.  hd tomorrow. 4 k bath. Keep bumex 2mg on non dialysis days     2.  Hyponatremia- Na 129 in the setting of severe hyperglycemia, hyperosmolar hyponatremia. Now better with glucose correction     3.  HHS-started on insulin drip, managed per primary team. Off insulin drip     4.  History of CAD     5.  History of COPD - On trilogy at night but did not use it last night     6.  Anemia / previous GI bleed / b12 deficiency-  s/p capsule endoscopy which showed few small non-bleeding intestinal AVMs and single small polyp. Will likely require transfusion soon as her Hgb is 7 and will likely dilute further.     7.  HTN- Continue home antihypertensives                  This document has been electronically signed by Ace Lauren MD on January 11, 2022 12:21 CST

## 2022-01-11 NOTE — CONSULTS
Nutrition Services    Patient Name:  Yamileth Slater  YOB: 1960  MRN: [id number]  Admit Date:  1/9/2022  Po diet has been started.  RD visited to assess educational needs.  Of note--She has been educated in the past regarding her diets.  She was confused and education was not appropriate at this time.  Her behavior showed that she was confused.  RD will continue to monitor her tx plans and follow POC.      Electronically signed by:  Gissel Littlejohn RD  01/11/22 11:52 CST

## 2022-01-11 NOTE — PLAN OF CARE
Pt complaint with BiPAP all night. Still minimal responding.       Problem: Noninvasive Ventilation Acute  Goal: Effective Unassisted Ventilation and Oxygenation  Outcome: Ongoing, Progressing   Goal Outcome Evaluation:

## 2022-01-11 NOTE — PROGRESS NOTES
CRITICAL CARE DAILY PROGRESS NOTE  Family Medicine Residency Service  NAME: Yamileth Slater  : 1960  MRN: [id number]      LOS: 1 day     PROVIDER OF SERVICE: Luz Quijano MD    Chief Complaint: Type 2 diabetes mellitus with kidney complication, with long-term current use of insulin (HCC)    Subjective:     Interval History: History provided by: patient chart    Patient was seen awake this AM. She was resting in her bed. She was responsive and was able to communicate this AM. She does not remember what happened and why she was brought to the hospital. I notified her about her glucose being 800 on admission and patient was surprised. She states she is only hurting at spots where she was stuck for blood draws. She is improving from admission. She currently denies chest pain, soa, abdominal discomfort, n/v.     Feeding: oral;   Analgesia: Morphine IV  and Tylenol   Sedation: N/A  Thromboprophylaxis: Heparin  HOB: 30 degrees  Ulcer ppx: PPIs  Glucose control: SSI, Basal insulin and Prandial insulin  SBT: N/A  Bowel Regimen:Miralax and Colace   Indwelling catheters: PIC line, Day# 3; Froman catheter is in place.  De-escalation of antibiotics: D/C Zosyn and Vancomycin     Review of Systems:   Review of Systems   Constitutional: Negative.    Respiratory: Negative.  Negative for chest tightness and shortness of breath.    Cardiovascular: Negative.  Negative for chest pain and leg swelling.   Gastrointestinal: Negative.  Negative for abdominal distention, abdominal pain, diarrhea, nausea and vomiting.   Endocrine: Negative.    Musculoskeletal: Negative for arthralgias and myalgias.   Skin: Negative.    Neurological: Negative.  Negative for dizziness and headaches.   Psychiatric/Behavioral: Negative.  Negative for suicidal ideas.       Objective:     Vital Signs  Temp:  [97.1 °F (36.2 °C)-99.1 °F (37.3 °C)] 98.4 °F (36.9 °C)  Heart Rate:  [] 85  Resp:  [12-22] 16  BP: ()/(46-59) 99/46  Body mass  index is 43.9 kg/m².         I/O last 3 completed shifts:  In: 3120 [P.O.:250; I.V.:2528; IV Piggyback:342]  Out: 1965 [Urine:965; Other:1000]    Physical Exam  Physical Exam  Constitutional:       General: She is not in acute distress.     Appearance: Normal appearance. She is obese. She is not toxic-appearing.   HENT:      Head: Normocephalic and atraumatic.      Nose: Nose normal.      Mouth/Throat:      Mouth: Mucous membranes are moist.   Cardiovascular:      Rate and Rhythm: Normal rate and regular rhythm.      Pulses: Normal pulses.      Heart sounds: Normal heart sounds.   Pulmonary:      Effort: Pulmonary effort is normal.      Breath sounds: Normal breath sounds.   Abdominal:      General: Abdomen is flat. Bowel sounds are normal.      Palpations: Abdomen is soft.   Musculoskeletal:         General: Normal range of motion.      Cervical back: Normal range of motion.   Skin:     General: Skin is warm and dry.      Capillary Refill: Capillary refill takes less than 2 seconds.   Neurological:      General: No focal deficit present.      Mental Status: She is alert and oriented to person, place, and time.   Psychiatric:         Mood and Affect: Mood normal.         Behavior: Behavior normal.         Thought Content: Thought content normal.         Medication Review    Current Facility-Administered Medications:   •  acetaminophen (TYLENOL) tablet 650 mg, 650 mg, Oral, Q4H PRN **OR** acetaminophen (TYLENOL) 160 MG/5ML solution 650 mg, 650 mg, Oral, Q4H PRN **OR** acetaminophen (TYLENOL) suppository 650 mg, 650 mg, Rectal, Q4H PRN, Luz Quijano MD  •  albuterol (PROVENTIL) nebulizer solution 0.083% 2.5 mg/3mL, 2.5 mg, Nebulization, Q4H PRN, Luz Quijano MD  •  aspirin EC tablet 81 mg, 81 mg, Oral, Daily, Luz Quijano MD, 81 mg at 01/11/22 0807  •  atorvastatin (LIPITOR) tablet 20 mg, 20 mg, Oral, Daily, Luz Quijano MD, 20 mg at 01/11/22 0807  •  sennosides-docusate (PERICOLACE) 8.6-50 MG per tablet 2  tablet, 2 tablet, Oral, BID, 2 tablet at 01/11/22 0807 **AND** polyethylene glycol (MIRALAX) packet 17 g, 17 g, Oral, Daily PRN **AND** bisacodyl (DULCOLAX) EC tablet 5 mg, 5 mg, Oral, Daily PRN **AND** bisacodyl (DULCOLAX) suppository 10 mg, 10 mg, Rectal, Daily PRN, Luz Quijano MD  •  bumetanide (BUMEX) tablet 1 mg, 1 mg, Oral, BID, Paulina Solano MD, 1 mg at 01/11/22 0807  •  clopidogrel (PLAVIX) tablet 75 mg, 75 mg, Oral, Daily, Luz Quijano MD, 75 mg at 01/11/22 0807  •  dextrose (D50W) (25 g/50 mL) IV injection 12.5 g, 12.5 g, Intravenous, PRN, Luz Quijano MD  •  dextrose (D50W) (25 g/50 mL) IV injection 25 g, 25 g, Intravenous, Q15 Min PRN, Paulina Solano MD  •  dextrose (GLUTOSE) oral gel 15 g, 15 g, Oral, Q15 Min PRN, Paulina Solano MD  •  gabapentin (NEURONTIN) capsule 300 mg, 300 mg, Oral, Q8H, Luz Quijano MD, 300 mg at 01/11/22 0648  •  glucagon (human recombinant) (GLUCAGEN DIAGNOSTIC) injection 1 mg, 1 mg, Subcutaneous, Q15 Min PRN, Paulina Solano MD  •  heparin (porcine) 5000 UNIT/ML injection 5,000 Units, 5,000 Units, Subcutaneous, Q8H, Alvarado Mauricio MD, 5,000 Units at 01/11/22 0648  •  heparin (porcine) injection 2,000 Units, 2,000 Units, Intracatheter, PRN, Ace Lauren MD, 2,000 Units at 01/10/22 1136  •  hydrALAZINE (APRESOLINE) injection 10 mg, 10 mg, Intravenous, Q6H PRN, Jm Barrera APRN, 10 mg at 01/09/22 1628  •  insulin aspart (novoLOG) injection 0-9 Units, 0-9 Units, Subcutaneous, TID AC, Paulina Solano MD, 4 Units at 01/11/22 0654  •  insulin aspart (novoLOG) injection 30 Units, 30 Units, Subcutaneous, TID With Meals, Paulina Solano MD, 30 Units at 01/11/22 0808  •  insulin detemir (LEVEMIR) injection 60 Units, 60 Units, Subcutaneous, Nightly, Paulina Solano MD  •  ipratropium-albuterol (DUO-NEB) nebulizer solution 3 mL, 3 mL, Nebulization, 4x Daily - RT, Luz Quijano MD, 3 mL at 01/11/22 1030  •  isosorbide mononitrate  (IMDUR) 24 hr tablet 30 mg, 30 mg, Oral, QAM, Luz Quijano MD  •  labetalol (NORMODYNE,TRANDATE) injection 20 mg, 20 mg, Intravenous, Q6H PRN, Luz Quijano MD, 20 mg at 01/09/22 1737  •  levothyroxine (SYNTHROID, LEVOTHROID) tablet 25 mcg, 25 mcg, Oral, Daily, Luz Quijano MD, 25 mcg at 01/11/22 0807  •  lisinopril (PRINIVIL,ZESTRIL) tablet 5 mg, 5 mg, Oral, Daily, Luz Quijano MD  •  metoprolol tartrate (LOPRESSOR) tablet 50 mg, 50 mg, Oral, Q12H, Luz Quijano MD, 50 mg at 01/10/22 2048  •  montelukast (SINGULAIR) tablet 10 mg, 10 mg, Oral, Nightly, Luz Quijano MD, 10 mg at 01/10/22 2048  •  morphine injection 1 mg, 1 mg, Intravenous, Q4H PRN, Luz Quijano MD, 1 mg at 01/09/22 1737  •  nystatin (MYCOSTATIN) powder, , Topical, Q12H, Luz Quijano MD, Given at 01/11/22 0807  •  O2 (OXYGEN), 3 L/min, Inhalation, Continuous, Luz Quijano MD, Last Rate: 180,000 mL/hr at 01/09/22 1809, 3 L/min at 01/09/22 1809  •  ondansetron (ZOFRAN) injection 4 mg, 4 mg, Intravenous, Q6H PRN, Luz Quijano MD  •  oxybutynin XL (DITROPAN-XL) 24 hr tablet 5 mg, 5 mg, Oral, Daily, Luz Quijano MD, 5 mg at 01/11/22 0807  •  pantoprazole (PROTONIX) injection 40 mg, 40 mg, Intravenous, Q AM, Luz Quijano MD, 40 mg at 01/11/22 0648  •  ranolazine (RANEXA) 12 hr tablet 500 mg, 500 mg, Oral, Q12H, Luz Quijano MD, 500 mg at 01/11/22 0807  •  sodium chloride 0.9 % flush 10 mL, 10 mL, Intravenous, PRN, Luz Quijano MD  •  sodium chloride 0.9 % flush 10 mL, 10 mL, Intravenous, Once PRN, Luz Quijano MD  •  sodium chloride 0.9 % flush 10 mL, 10 mL, Intravenous, Q12H, Luz Quijano MD, 10 mL at 01/11/22 0812  •  sodium chloride 0.9 % flush 10 mL, 10 mL, Intravenous, PRN, Luz Quijano MD  •  sodium chloride 0.9 % infusion, 10 mL/hr, Intravenous, Continuous PRKINJAL, Luz Quijano MD     Diagnostic Data    Lab Results (last 24 hours)     Procedure Component Value Units Date/Time    Phosphorus [[id number]]  (Abnormal)  Collected: 01/11/22 0738    Specimen: Blood Updated: 01/11/22 0817     Phosphorus 5.4 mg/dL     POC Glucose Once [[id number]]  (Abnormal) Collected: 01/11/22 0650    Specimen: Blood Updated: 01/11/22 0710     Glucose 213 mg/dL      Comment: : [id number] RADHA RMASEYAMeter ID: [id number]       Basic Metabolic Panel [[id number]]  (Abnormal) Collected: 01/11/22 0401    Specimen: Blood Updated: 01/11/22 0515     Glucose 193 mg/dL      BUN 43 mg/dL      Creatinine 4.43 mg/dL      Sodium 140 mmol/L      Potassium 3.5 mmol/L      Chloride 100 mmol/L      CO2 25.0 mmol/L      Calcium 8.4 mg/dL      eGFR   Amer --     Comment: <15 Indicative of kidney failure.        eGFR Non African Amer 10 mL/min/1.73      Comment: <15 Indicative of kidney failure.        BUN/Creatinine Ratio 9.7     Anion Gap 15.0 mmol/L     Narrative:      GFR Normal >60  Chronic Kidney Disease <60  Kidney Failure <15      Phosphorus [[id number]]  (Abnormal) Collected: 01/11/22 0401    Specimen: Blood Updated: 01/11/22 0447     Phosphorus 4.7 mg/dL     CBC (No Diff) [[id number]]  (Abnormal) Collected: 01/11/22 0401    Specimen: Blood Updated: 01/11/22 0434     WBC 11.07 10*3/mm3      RBC 3.14 10*6/mm3      Hemoglobin 8.9 g/dL      Hematocrit 28.8 %      MCV 91.7 fL      MCH 28.3 pg      MCHC 30.9 g/dL      RDW 16.1 %      RDW-SD 53.9 fl      MPV 9.0 fL      Platelets 226 10*3/mm3     POC Glucose Once [[id number]]  (Abnormal) Collected: 01/11/22 0341    Specimen: Blood Updated: 01/11/22 0356     Glucose 200 mg/dL      Comment: : [id number] RADHA GynesonicsAMeter ID: [id number]       POC Glucose Once [[id number]]  (Abnormal) Collected: 01/10/22 2356    Specimen: Blood Updated: 01/11/22 0355     Glucose 168 mg/dL      Comment: : [id number] RADHA CubeaconANTHAMeter ID: [id number]       POC Glucose Once [[id number]]  (Abnormal) Collected: 01/10/22 2046    Specimen: Blood Updated: 01/11/22 0354     Glucose 148 mg/dL      Comment:  : [id number] RADHA Krishnan ID: [id number]       Phosphorus [[id number]]  (Normal) Collected: 01/11/22 0007    Specimen: Blood Updated: 01/11/22 0040     Phosphorus 4.3 mg/dL     Phosphorus [[id number]]  (Normal) Collected: 01/10/22 1940    Specimen: Blood Updated: 01/10/22 2002     Phosphorus 2.8 mg/dL     POC Glucose Once [[id number]]  (Abnormal) Collected: 01/10/22 1714    Specimen: Blood Updated: 01/10/22 1727     Glucose 245 mg/dL      Comment: : [id number] YESICA CERRATOREYMeter ID: [id number]       Blood Culture - Blood, Arm, Left [[id number]]  (Normal) Collected: 01/09/22 1618    Specimen: Blood from Arm, Left Updated: 01/10/22 1631     Blood Culture No growth at 24 hours    Blood Culture - Blood, Hand, Left [[id number]]  (Normal) Collected: 01/09/22 1619    Specimen: Blood from Hand, Left Updated: 01/10/22 1631     Blood Culture No growth at 24 hours    Ammonia [[id number]]  (Normal) Collected: 01/10/22 1550    Specimen: Blood Updated: 01/10/22 1614     Ammonia 34 umol/L     Phosphorus [[id number]]  (Normal) Collected: 01/10/22 1551    Specimen: Blood Updated: 01/10/22 1612     Phosphorus 2.5 mg/dL     Blood Gas, Arterial - [[id number]]  (Abnormal) Collected: 01/10/22 1540    Specimen: Arterial Blood Updated: 01/10/22 1545     Site Left Brachial     Shadi's Test N/A     pH, Arterial 7.440 pH units      pCO2, Arterial 33.4 mm Hg      Comment: 84 Value below reference range        pO2, Arterial 78.3 mm Hg      Comment: 84 Value below reference range        HCO3, Arterial 22.7 mmol/L      Base Excess, Arterial -1.2 mmol/L      Comment: 84 Value below reference range        O2 Saturation, Arterial 97.8 %      Barometric Pressure for Blood Gas 760 mmHg      Modality Nasal Cannula     Flow Rate 2.0 lpm      Ventilator Mode NA     Collected by      Comment: Meter: [id number]     :  [id number]       POC Glucose Once [[id number]]  (Abnormal) Collected: 01/10/22 1333    Specimen: Blood  Updated: 01/10/22 1345     Glucose 266 mg/dL      Comment: : [phone] IPOCK AUDREYMeter ID: [id number]       POC Glucose Once [[id number]]  (Abnormal) Collected: 01/10/22 1112    Specimen: Blood Updated: 01/10/22 1344     Glucose 160 mg/dL      Comment: : [phone] IPOCK AUDREYMeter ID: [id number]       Vancomycin, Random [[id number]]  (Normal) Collected: 01/10/22 1132    Specimen: Blood Updated: 01/10/22 1313     Vancomycin Random 16.10 mcg/mL     Extra Tubes [[id number]] Collected: 01/10/22 1130    Specimen: Blood, Venous Line Updated: 01/10/22 1245    Narrative:      The following orders were created for panel order Extra Tubes.  Procedure                               Abnormality         Status                     ---------                               -----------         ------                     Gold Top - SST[[id number]]                                   Final result                 Please view results for these tests on the individual orders.    Gold Top - SST [[id number]] Collected: 01/10/22 1130    Specimen: Blood Updated: 01/10/22 1245     Extra Tube Hold for add-ons.     Comment: Auto resulted.       Basic Metabolic Panel [[id number]]  (Abnormal) Collected: 01/10/22 1214    Specimen: Blood Updated: 01/10/22 1237     Glucose 255 mg/dL      BUN 32 mg/dL      Creatinine 2.91 mg/dL      Sodium 132 mmol/L      Potassium 3.2 mmol/L      Chloride 97 mmol/L      CO2 20.0 mmol/L      Calcium 8.1 mg/dL      eGFR Non African Amer 16 mL/min/1.73      BUN/Creatinine Ratio 11.0     Anion Gap 15.0 mmol/L     Narrative:      GFR Normal >60  Chronic Kidney Disease <60  Kidney Failure <15      Phosphorus [[id number]]  (Normal) Collected: 01/10/22 1214    Specimen: Blood Updated: 01/10/22 1237     Phosphorus 2.6 mg/dL     Blood Culture - Blood, Arm, Left [[id number]]  (Normal) Collected: 01/09/22 1214    Specimen: Blood from Arm, Left Updated: 01/10/22 1230     Blood Culture No growth at 24 hours     Hepatitis B Surface Antigen [[id number]]  (Normal) Collected: 01/09/22 1214    Specimen: Blood Updated: 01/10/22 1148     Hepatitis B Surface Ag Non-Reactive          I reviewed the patient's new clinical results.    Assessment/Plan:     Active Hospital Problems    Diagnosis  POA   • **Type 2 diabetes mellitus with kidney complication, with long-term current use of insulin (HCC) [E11.29, Z79.4]  Not Applicable     - Levemir 60 units nightly  - Novolog 30 with meals  - SSI  -HgA1c 10/2021 : 8.80       • Personal history of noncompliance with medical treatment, presenting hazards to health [Z91.19]  Not Applicable     · Difficulty showing up to clinic visits due to complexity of medical problems and transportation  · Would really benefit from a couple home visits from PCP to help improve compliance      • Anemia due to chronic kidney disease, on chronic dialysis (HCC) [N18.6, D63.1, Z99.2]  Not Applicable     - MWF Dialysis patient  - Epoetin (Retacrit) 10,000 units three times a week on dialysis days MWF  - Hg 7 on admission. Transfuse if Hg less than 7  - Type and screen done   - Daily CBC   -Cr. 3.4 on admission         • Unspecified diastolic (congestive) heart failure (HCC) [I50.30]  Yes     - Continue Imdur, metoprolol, lisinopril, bumex  - pro-BNP 14874  -Troponin 0.387, 0.451, 0.673  - Repeat EKG : NSR with t-wave inversion  - Echo 1/10/2022 : EF 61-65%  -Cardiology on board (Dr. Handy) - appreciate recommendations  - MWF Dialysis patient, caution with HHS fluids      • MIKE and COPD overlap syndrome (HCC) [G47.33, J44.9]  Yes     · Home Trilogy/CPAP  · Home 3L oxygen dependent. Maintain strict Sats between 88-92%      • Hypothyroidism [E03.9]  Yes     Continue home Levothyroxine 25mcg, stable     • Coronary artery disease [I25.10]  Yes     · S/P CABG x 3 (Primary), Bilateral carotid artery stenosis w/ 2 stents on left side,  PAD (peripheral artery disease) with stent in right upper leg  · Continue   - aspirin  -  Plavix 75mg daily  - atorvastatin 20 mg daily  - lisinopril 5mg daily  -lopressor 50mg   -Imdur 30mg daily  - Ranexa - 500mg BID   · Troponin 0.26, Trend x2   · EKG - sinus tachycardia             • COPD (chronic obstructive pulmonary disease) (HCC) [J44.9]  Yes     - Dependent on 3L NC at Home  - Maintain strict sats between 88-92%  -Singulair 10mg   -DuoNebs  -NIPPV via Respiratory. Patient uses Trilogy at home/night      • Hypertension [I10]  Yes     · Elevated BP in ER   · Metoprolol 50mg bid, hold if HR < 60bpm  · Lisinopril 5mg daily  · Telemetry  · Hydralazine 10mg prn q6h for SBP > 170  · Labetalol 20mg Q6h >160           Code status is   Code Status and Medical Interventions:   Ordered at: 01/09/22 1508     Level Of Support Discussed With:    Next of Kin (If No Surrogate)     Code Status (Patient has no pulse and is not breathing):    CPR (Attempt to Resuscitate)     Medical Interventions (Patient has pulse or is breathing):    Full Support     Comments:    Discussed with  Huy Slater at bedside       Prognosis: good  Plan for disposition:Where: home and When:  1-2days    Time: Critical care 20 mins min        This document has been electronically signed by Luz Quijano MD on January 11, 2022 11:29 CST

## 2022-01-11 NOTE — PLAN OF CARE
Goal Outcome Evaluation:  Plan of Care Reviewed With: patient        Progress: improving  Outcome Summary: Swallow evaluation completed. No s/s of aspiration on regular solids and regular liquids. Pt on regular diet and regular liquids. No tx recommended. RN aware.

## 2022-01-12 LAB
ANION GAP SERPL CALCULATED.3IONS-SCNC: 17 MMOL/L (ref 5–15)
BACTERIA BLD CULT: NORMAL
BUN SERPL-MCNC: 57 MG/DL (ref 8–23)
BUN/CREAT SERPL: 8.5 (ref 7–25)
CALCIUM SPEC-SCNC: 7.9 MG/DL (ref 8.6–10.5)
CHLORIDE SERPL-SCNC: 99 MMOL/L (ref 98–107)
CO2 SERPL-SCNC: 19 MMOL/L (ref 22–29)
CREAT SERPL-MCNC: 6.73 MG/DL (ref 0.57–1)
DEPRECATED RDW RBC AUTO: 51.6 FL (ref 37–54)
ERYTHROCYTE [DISTWIDTH] IN BLOOD BY AUTOMATED COUNT: 15.7 % (ref 12.3–15.4)
GFR SERPL CREATININE-BSD FRML MDRD: 6 ML/MIN/1.73
GFR SERPL CREATININE-BSD FRML MDRD: ABNORMAL ML/MIN/{1.73_M2}
GLUCOSE BLDC GLUCOMTR-MCNC: 118 MG/DL (ref 70–130)
GLUCOSE BLDC GLUCOMTR-MCNC: 302 MG/DL (ref 70–130)
GLUCOSE BLDC GLUCOMTR-MCNC: 303 MG/DL (ref 70–130)
GLUCOSE BLDC GLUCOMTR-MCNC: 85 MG/DL (ref 70–130)
GLUCOSE BLDC GLUCOMTR-MCNC: 86 MG/DL (ref 70–130)
GLUCOSE SERPL-MCNC: 313 MG/DL (ref 65–99)
HCT VFR BLD AUTO: 27 % (ref 34–46.6)
HGB BLD-MCNC: 8.6 G/DL (ref 12–15.9)
MCH RBC QN AUTO: 28.8 PG (ref 26.6–33)
MCHC RBC AUTO-ENTMCNC: 31.9 G/DL (ref 31.5–35.7)
MCV RBC AUTO: 90.3 FL (ref 79–97)
PHOSPHATE SERPL-MCNC: 3.7 MG/DL (ref 2.5–4.5)
PHOSPHATE SERPL-MCNC: 4 MG/DL (ref 2.5–4.5)
PHOSPHATE SERPL-MCNC: 4.1 MG/DL (ref 2.5–4.5)
PHOSPHATE SERPL-MCNC: 6.4 MG/DL (ref 2.5–4.5)
PHOSPHATE SERPL-MCNC: 6.9 MG/DL (ref 2.5–4.5)
PHOSPHATE SERPL-MCNC: 7 MG/DL (ref 2.5–4.5)
PLATELET # BLD AUTO: 202 10*3/MM3 (ref 140–450)
PMV BLD AUTO: 9 FL (ref 6–12)
POTASSIUM SERPL-SCNC: 3.6 MMOL/L (ref 3.5–5.2)
RBC # BLD AUTO: 2.99 10*6/MM3 (ref 3.77–5.28)
SODIUM SERPL-SCNC: 135 MMOL/L (ref 136–145)
WBC NRBC COR # BLD: 9.07 10*3/MM3 (ref 3.4–10.8)

## 2022-01-12 PROCEDURE — 63710000001 INSULIN ASPART PER 5 UNITS: Performed by: STUDENT IN AN ORGANIZED HEALTH CARE EDUCATION/TRAINING PROGRAM

## 2022-01-12 PROCEDURE — 25010000002 MORPHINE PER 10 MG: Performed by: STUDENT IN AN ORGANIZED HEALTH CARE EDUCATION/TRAINING PROGRAM

## 2022-01-12 PROCEDURE — 25010000002 HEPARIN (PORCINE) PER 1000 UNITS: Performed by: STUDENT IN AN ORGANIZED HEALTH CARE EDUCATION/TRAINING PROGRAM

## 2022-01-12 PROCEDURE — 85027 COMPLETE CBC AUTOMATED: CPT | Performed by: STUDENT IN AN ORGANIZED HEALTH CARE EDUCATION/TRAINING PROGRAM

## 2022-01-12 PROCEDURE — 25010000002 HEPARIN (PORCINE) PER 1000 UNITS: Performed by: INTERNAL MEDICINE

## 2022-01-12 PROCEDURE — 94799 UNLISTED PULMONARY SVC/PX: CPT

## 2022-01-12 PROCEDURE — 80048 BASIC METABOLIC PNL TOTAL CA: CPT | Performed by: STUDENT IN AN ORGANIZED HEALTH CARE EDUCATION/TRAINING PROGRAM

## 2022-01-12 PROCEDURE — 99232 SBSQ HOSP IP/OBS MODERATE 35: CPT | Performed by: STUDENT IN AN ORGANIZED HEALTH CARE EDUCATION/TRAINING PROGRAM

## 2022-01-12 PROCEDURE — 97162 PT EVAL MOD COMPLEX 30 MIN: CPT

## 2022-01-12 PROCEDURE — 63710000001 INSULIN DETEMIR PER 5 UNITS: Performed by: STUDENT IN AN ORGANIZED HEALTH CARE EDUCATION/TRAINING PROGRAM

## 2022-01-12 PROCEDURE — 84100 ASSAY OF PHOSPHORUS: CPT | Performed by: STUDENT IN AN ORGANIZED HEALTH CARE EDUCATION/TRAINING PROGRAM

## 2022-01-12 PROCEDURE — 82962 GLUCOSE BLOOD TEST: CPT

## 2022-01-12 PROCEDURE — 97166 OT EVAL MOD COMPLEX 45 MIN: CPT

## 2022-01-12 RX ORDER — DEXTROSE MONOHYDRATE 25 G/50ML
25 INJECTION, SOLUTION INTRAVENOUS
Status: DISCONTINUED | OUTPATIENT
Start: 2022-01-12 | End: 2022-01-12

## 2022-01-12 RX ORDER — HEPARIN SODIUM 1000 [USP'U]/ML
2000 INJECTION, SOLUTION INTRAVENOUS; SUBCUTANEOUS AS NEEDED
Status: DISCONTINUED | OUTPATIENT
Start: 2022-01-12 | End: 2022-01-14

## 2022-01-12 RX ORDER — NICOTINE POLACRILEX 4 MG
15 LOZENGE BUCCAL
Status: DISCONTINUED | OUTPATIENT
Start: 2022-01-12 | End: 2022-01-12

## 2022-01-12 RX ORDER — ALBUMIN (HUMAN) 12.5 G/50ML
12.5 SOLUTION INTRAVENOUS AS NEEDED
Status: ACTIVE | OUTPATIENT
Start: 2022-01-12 | End: 2022-01-12

## 2022-01-12 RX ADMIN — ISOSORBIDE MONONITRATE 30 MG: 30 TABLET, EXTENDED RELEASE ORAL at 05:17

## 2022-01-12 RX ADMIN — MORPHINE SULFATE 1 MG: 2 INJECTION, SOLUTION INTRAMUSCULAR; INTRAVENOUS at 06:29

## 2022-01-12 RX ADMIN — IPRATROPIUM BROMIDE AND ALBUTEROL SULFATE 3 ML: 2.5; .5 SOLUTION RESPIRATORY (INHALATION) at 20:02

## 2022-01-12 RX ADMIN — DOCUSATE SODIUM 50 MG AND SENNOSIDES 8.6 MG 2 TABLET: 8.6; 5 TABLET, FILM COATED ORAL at 08:08

## 2022-01-12 RX ADMIN — INSULIN ASPART 10 UNITS: 100 INJECTION, SOLUTION INTRAVENOUS; SUBCUTANEOUS at 14:22

## 2022-01-12 RX ADMIN — GABAPENTIN 300 MG: 300 CAPSULE ORAL at 05:17

## 2022-01-12 RX ADMIN — IPRATROPIUM BROMIDE AND ALBUTEROL SULFATE 3 ML: 2.5; .5 SOLUTION RESPIRATORY (INHALATION) at 07:31

## 2022-01-12 RX ADMIN — INSULIN ASPART 30 UNITS: 100 INJECTION, SOLUTION INTRAVENOUS; SUBCUTANEOUS at 07:59

## 2022-01-12 RX ADMIN — OXYBUTYNIN CHLORIDE 5 MG: 5 TABLET, EXTENDED RELEASE ORAL at 08:08

## 2022-01-12 RX ADMIN — SODIUM CHLORIDE, PRESERVATIVE FREE 10 ML: 5 INJECTION INTRAVENOUS at 21:12

## 2022-01-12 RX ADMIN — ATORVASTATIN CALCIUM 20 MG: 20 TABLET, FILM COATED ORAL at 08:08

## 2022-01-12 RX ADMIN — GABAPENTIN 300 MG: 300 CAPSULE ORAL at 14:55

## 2022-01-12 RX ADMIN — RANOLAZINE 500 MG: 500 TABLET, FILM COATED, EXTENDED RELEASE ORAL at 08:08

## 2022-01-12 RX ADMIN — GABAPENTIN 300 MG: 300 CAPSULE ORAL at 21:13

## 2022-01-12 RX ADMIN — HEPARIN SODIUM 5000 UNITS: 5000 INJECTION INTRAVENOUS; SUBCUTANEOUS at 05:17

## 2022-01-12 RX ADMIN — HEPARIN SODIUM 3600 UNITS: 1000 INJECTION INTRAVENOUS; SUBCUTANEOUS at 11:04

## 2022-01-12 RX ADMIN — CLOPIDOGREL 75 MG: 75 TABLET, FILM COATED ORAL at 08:08

## 2022-01-12 RX ADMIN — METOPROLOL TARTRATE 50 MG: 50 TABLET, FILM COATED ORAL at 21:13

## 2022-01-12 RX ADMIN — HEPARIN SODIUM 5000 UNITS: 5000 INJECTION INTRAVENOUS; SUBCUTANEOUS at 14:22

## 2022-01-12 RX ADMIN — SODIUM CHLORIDE, PRESERVATIVE FREE 10 ML: 5 INJECTION INTRAVENOUS at 08:10

## 2022-01-12 RX ADMIN — ASPIRIN 81 MG: 81 TABLET, FILM COATED ORAL at 08:08

## 2022-01-12 RX ADMIN — PANTOPRAZOLE SODIUM 40 MG: 40 INJECTION, POWDER, FOR SOLUTION INTRAVENOUS at 05:17

## 2022-01-12 RX ADMIN — INSULIN ASPART 7 UNITS: 100 INJECTION, SOLUTION INTRAVENOUS; SUBCUTANEOUS at 07:59

## 2022-01-12 RX ADMIN — LEVOTHYROXINE SODIUM 25 MCG: 25 TABLET ORAL at 08:08

## 2022-01-12 RX ADMIN — INSULIN ASPART 30 UNITS: 100 INJECTION, SOLUTION INTRAVENOUS; SUBCUTANEOUS at 17:32

## 2022-01-12 RX ADMIN — HEPARIN SODIUM 5000 UNITS: 5000 INJECTION INTRAVENOUS; SUBCUTANEOUS at 21:13

## 2022-01-12 RX ADMIN — INSULIN ASPART 30 UNITS: 100 INJECTION, SOLUTION INTRAVENOUS; SUBCUTANEOUS at 11:02

## 2022-01-12 RX ADMIN — NYSTATIN: 100000 POWDER TOPICAL at 14:22

## 2022-01-12 RX ADMIN — INSULIN DETEMIR 30 UNITS: 100 INJECTION, SOLUTION SUBCUTANEOUS at 11:00

## 2022-01-12 RX ADMIN — MONTELUKAST 10 MG: 10 TABLET, FILM COATED ORAL at 21:13

## 2022-01-12 RX ADMIN — HEPARIN SODIUM 2000 UNITS: 1000 INJECTION INTRAVENOUS; SUBCUTANEOUS at 08:12

## 2022-01-12 RX ADMIN — RANOLAZINE 500 MG: 500 TABLET, FILM COATED, EXTENDED RELEASE ORAL at 21:12

## 2022-01-12 RX ADMIN — INSULIN DETEMIR 30 UNITS: 100 INJECTION, SOLUTION SUBCUTANEOUS at 22:00

## 2022-01-12 RX ADMIN — INSULIN ASPART 7 UNITS: 100 INJECTION, SOLUTION INTRAVENOUS; SUBCUTANEOUS at 11:00

## 2022-01-12 NOTE — SIGNIFICANT NOTE
01/12/22 0859   OTHER   Discipline occupational therapist; physical therapist   Rehab Time/Intention   Session Not Performed other (see comments)  (Patient at dialysis, will f/u as able)

## 2022-01-12 NOTE — PLAN OF CARE
Goal Outcome Evaluation:           Progress: no change  Outcome Summary: pt resting well. vss. no signs of distress.

## 2022-01-12 NOTE — PROGRESS NOTES
"[facility]  [address] [place] [address]. [address]  T - [phone]  F - [phone]     Progress Note          PATIENT  DEMOGRAPHICS   PATIENT NAME: Yamileth Slater                      PHYSICIAN: Ace Lauren MD  : 1960  MRN: [id number]   LOS: 2 days    Patient Care Team:  Rianna Macias MD as PCP - General (Family Medicine)  Subjective   SUBJECTIVE   Alert. Finished hd , remove close to 1 L today.         Objective   OBJECTIVE   Vital Signs  Temp:  [96.9 °F (36.1 °C)-97.6 °F (36.4 °C)] 97.5 °F (36.4 °C)  Heart Rate:  [] 83  Resp:  [16-20] 20  BP: ()/(50-65) 101/50    Flowsheet Rows      First Filed Value   Admission Height 167.6 cm (66\") Documented at 2022 1304   Admission Weight 132 kg (291 lb 9.6 oz) Documented at 2022 1048           I/O last 3 completed shifts:  In: 470 [P.O.:370; IV Piggyback:100]  Out: 230 [Urine:230]    PHYSICAL EXAM    Physical Exam  Constitutional:       Appearance: She is well-developed.   HENT:      Head: Normocephalic.   Eyes:      Pupils: Pupils are equal, round, and reactive to light.   Cardiovascular:      Rate and Rhythm: Normal rate and regular rhythm.      Heart sounds: Normal heart sounds.   Pulmonary:      Effort: Pulmonary effort is normal.      Breath sounds: Normal breath sounds.   Abdominal:      General: Bowel sounds are normal.      Palpations: Abdomen is soft.   Musculoskeletal:         General: No swelling.   Skin:     Coloration: Skin is not jaundiced.   Neurological:      Mental Status: She is alert. She is disoriented.         RESULTS   Results Review:    Results from last 7 days   Lab Units 22  0432 22  0401 01/10/22  1214 22  1618 22  1214   SODIUM mmol/L 135* 140 132*   < > 129*   POTASSIUM mmol/L 3.6 3.5 3.2*   < > 4.0   CHLORIDE mmol/L 99 100 97*   < > 87*   CO2 mmol/L 19.0* 25.0 20.0*   < > 18.0*   BUN mg/dL 57* 43* 32*   < > 44*   CREATININE mg/dL 6.73* 4.43* 2.91*   " < > 3.46*   CALCIUM mg/dL 7.9* 8.4* 8.1*   < > 9.0   BILIRUBIN mg/dL  --   --   --   --  0.2   ALK PHOS U/L  --   --   --   --  192*   ALT (SGPT) U/L  --   --   --   --  7   AST (SGOT) U/L  --   --   --   --  10   GLUCOSE mg/dL 313* 193* 255*   < > 800*    < > = values in this interval not displayed.       Estimated Creatinine Clearance: 11.9 mL/min (A) (by C-G formula based on SCr of 6.73 mg/dL (H)).    Results from last 7 days   Lab Units 01/12/22  1214 01/12/22  0815 01/12/22  0432 01/09/22  1618 01/09/22  1214   MAGNESIUM mg/dL  --   --   --   --  1.9   PHOSPHORUS mg/dL 3.7 7.0* 6.9*   < > 6.1*    < > = values in this interval not displayed.             Results from last 7 days   Lab Units 01/12/22  0432 01/11/22  0401 01/10/22  0419 01/09/22  1214   WBC 10*3/mm3 9.07 11.07* 11.34* 17.78*   HEMOGLOBIN g/dL 8.6* 8.9* 8.5* 7.0*   PLATELETS 10*3/mm3 202 226 259 423       Results from last 7 days   Lab Units 01/09/22  1214   INR  1.10         Imaging Results (Last 24 Hours)     ** No results found for the last 24 hours. **           MEDICATIONS    aspirin, 81 mg, Oral, Daily  atorvastatin, 20 mg, Oral, Daily  [START ON 1/13/2022] bumetanide, 2 mg, Oral, Once per day on Sun Tue Thu Sat  clopidogrel, 75 mg, Oral, Daily  gabapentin, 300 mg, Oral, Q8H  heparin (porcine), 5,000 Units, Subcutaneous, Q8H  insulin aspart, 0-24 Units, Subcutaneous, TID AC  insulin aspart, 30 Units, Subcutaneous, TID With Meals  insulin detemir, 30 Units, Subcutaneous, Q12H  ipratropium-albuterol, 3 mL, Nebulization, 4x Daily - RT  isosorbide mononitrate, 30 mg, Oral, QAM  levothyroxine, 25 mcg, Oral, Daily  lisinopril, 5 mg, Oral, Daily  metoprolol tartrate, 50 mg, Oral, Q12H  montelukast, 10 mg, Oral, Nightly  nystatin, , Topical, Q12H  oxybutynin XL, 5 mg, Oral, Daily  pantoprazole, 40 mg, Intravenous, Q AM  ranolazine, 500 mg, Oral, Q12H  senna-docusate sodium, 2 tablet, Oral, BID  sodium chloride, 10 mL, Intravenous, Q12H      O2, 3  L/min, Last Rate: 3 L/min (01/09/22 8389)  sodium chloride, 10 mL/hr        Assessment/Plan   ASSESSMENT / PLAN      Type 2 diabetes mellitus with kidney complication, with long-term current use of insulin (HCC)    Hypertension    COPD (chronic obstructive pulmonary disease) (HCC)    Coronary artery disease    Hypothyroidism    MIKE and COPD overlap syndrome (HCC)    Unspecified diastolic (congestive) heart failure (HCC)    Anemia due to chronic kidney disease, on chronic dialysis (HCC)    Personal history of noncompliance with medical treatment, presenting hazards to health    1.  ESRD on HD- Initiated HD on 10/21 due to hyperkalemia and fluid overload, now  ESRD.  HD MWF for now.  Keep bumex 2mg on non dialysis days. Euvolemic at present. Cr much higher than Monday lab result.      2.  Hyponatremia- Na 129 in the setting of severe hyperglycemia, hyperosmolar hyponatremia. Now better with glucose correction     3.  HHS-started on insulin drip, managed per primary team. Off insulin drip. On aspart and levemir     4.  History of CAD     5.  History of COPD - On trilogy at night but did not use it last night     6.  Anemia / previous GI bleed / b12 deficiency-  s/p capsule endoscopy which showed few small non-bleeding intestinal AVMs and single small polyp. Will likely require transfusion soon as her Hgb is 7 and will likely dilute further.     7.  HTN- Continue home antihypertensives                  This document has been electronically signed by Ace Lauren MD on January 12, 2022 16:33 CST

## 2022-01-12 NOTE — PLAN OF CARE
Goal Outcome Evaluation:              Outcome Summary: dialysis performed this morning. 1 liter pulled off. pt tolerated well. Pt covered with insulin as ordered. No pain noted.

## 2022-01-12 NOTE — PROGRESS NOTES
FAMILY MEDICINE DAILY PROGRESS NOTE  NAME: Yamileth Slater  : 1960  MRN: [id number]     LOS: 2 days     PROVIDER OF SERVICE: Paulina Solano MD    Chief Complaint: Type 2 diabetes mellitus with kidney complication, with long-term current use of insulin (HCC)    Subjective:     Interval History:  History taken from: patient chart    Patient awake but limited interaction.  Dialysis nurse at bedside to get started on dialysis for today.  Patient communicating with simple phrases or words or hand gestures.     Review of Systems:   Difficulty in obtaining ROS secondary to patient's limited interaction.  Review of Systems   Constitutional: Positive for activity change and appetite change.   Respiratory: Negative for chest tightness and shortness of breath.    Cardiovascular: Negative for chest pain and palpitations.   Gastrointestinal: Negative for abdominal pain.   Musculoskeletal: Positive for arthralgias and myalgias. Negative for back pain.   Neurological: Negative for dizziness, weakness and headaches.   Psychiatric/Behavioral: Negative for agitation and confusion.       Objective:     Vital Signs  Temp:  [96.9 °F (36.1 °C)-97.7 °F (36.5 °C)] 97.1 °F (36.2 °C)  Heart Rate:  [] 88  Resp:  [16-20] 20  BP: ()/(51-65) 114/56    Physical Exam  Physical Exam  Vitals and nursing note reviewed.   Constitutional:       Appearance: She is well-developed.      Comments: Limited verbal response   HENT:      Head: Normocephalic and atraumatic.      Mouth/Throat:      Mouth: Mucous membranes are moist.   Eyes:      Extraocular Movements: Extraocular movements intact.      Pupils: Pupils are equal, round, and reactive to light.   Neck:      Thyroid: No thyromegaly.      Vascular: No JVD.      Trachea: No tracheal deviation.   Cardiovascular:      Rate and Rhythm: Normal rate and regular rhythm.      Pulses: Normal pulses.           Radial pulses are 2+ on the right side and 2+ on the left side.         Dorsalis pedis pulses are 2+ on the right side and 2+ on the left side.      Heart sounds: Normal heart sounds, S1 normal and S2 normal.   Pulmonary:      Effort: Pulmonary effort is normal.      Breath sounds: Normal breath sounds.      Comments: On 3 L supplemental oxygen via nasal cannula  Abdominal:      General: Bowel sounds are normal.   Musculoskeletal:         General: Normal range of motion.      Right lower leg: No edema.      Left lower leg: No edema.   Skin:     General: Skin is warm and dry.      Capillary Refill: Capillary refill takes 2 to 3 seconds.   Neurological:      Mental Status: She is alert.      GCS: GCS eye subscore is 4. GCS verbal subscore is 5. GCS motor subscore is 6.      Comments: Patient with grossly slowed responses.  Interactive using eyes and simple words and phrases.  Does follow commands   Psychiatric:         Speech: Speech normal.         Behavior: Behavior normal.         Thought Content: Thought content normal.         Medication Review    Current Facility-Administered Medications:   •  acetaminophen (TYLENOL) tablet 650 mg, 650 mg, Oral, Q4H PRN **OR** acetaminophen (TYLENOL) 160 MG/5ML solution 650 mg, 650 mg, Oral, Q4H PRN **OR** acetaminophen (TYLENOL) suppository 650 mg, 650 mg, Rectal, Q4H PRN, Paulina Solano MD  •  albumin human 25 % IV SOLN 12.5 g, 12.5 g, Intravenous, PRN, Ace Lauren MD  •  albuterol (PROVENTIL) nebulizer solution 0.083% 2.5 mg/3mL, 2.5 mg, Nebulization, Q4H PRN, Paulina Solano MD  •  aspirin EC tablet 81 mg, 81 mg, Oral, Daily, Paulina Solano MD, 81 mg at 01/12/22 0808  •  atorvastatin (LIPITOR) tablet 20 mg, 20 mg, Oral, Daily, Paulina Solano MD, 20 mg at 01/12/22 0808  •  sennosides-docusate (PERICOLACE) 8.6-50 MG per tablet 2 tablet, 2 tablet, Oral, BID, 2 tablet at 01/12/22 0808 **AND** polyethylene glycol (MIRALAX) packet 17 g, 17 g, Oral, Daily PRN **AND** bisacodyl (DULCOLAX) EC tablet 5 mg, 5 mg, Oral, Daily PRN  **AND** bisacodyl (DULCOLAX) suppository 10 mg, 10 mg, Rectal, Daily PRN, Paulina Solano MD  •  [START ON 1/13/2022] bumetanide (BUMEX) tablet 2 mg, 2 mg, Oral, Once per day on Sun Tue Thu Sat, Ace Lauren MD  •  clopidogrel (PLAVIX) tablet 75 mg, 75 mg, Oral, Daily, Paulina Solano MD, 75 mg at 01/12/22 0808  •  dextrose (D50W) (25 g/50 mL) IV injection 12.5 g, 12.5 g, Intravenous, PRN, Paulina Solano MD  •  dextrose (D50W) (25 g/50 mL) IV injection 25 g, 25 g, Intravenous, Q15 Min PRN, Paulina Solano MD  •  dextrose (GLUTOSE) oral gel 15 g, 15 g, Oral, Q15 Min PRN, Paulina Solano MD  •  gabapentin (NEURONTIN) capsule 300 mg, 300 mg, Oral, Q8H, Paulina Solano MD, 300 mg at 01/12/22 0517  •  glucagon (human recombinant) (GLUCAGEN DIAGNOSTIC) injection 1 mg, 1 mg, Subcutaneous, Q15 Min PRN, Paulina Solano MD  •  heparin (porcine) 5000 UNIT/ML injection 5,000 Units, 5,000 Units, Subcutaneous, Q8H, Paulina Solano MD, 5,000 Units at 01/12/22 0517  •  heparin (porcine) injection 2,000 Units, 2,000 Units, Intracatheter, PRN, Paulina Solano MD, 2,000 Units at 01/10/22 1136  •  heparin (porcine) injection 2,000 Units, 2,000 Units, Intracatheter, PRN, Ace Lauren MD, 3,600 Units at 01/12/22 1104  •  hydrALAZINE (APRESOLINE) injection 10 mg, 10 mg, Intravenous, Q6H PRN, Paulina Solano MD, 10 mg at 01/09/22 1628  •  insulin aspart (novoLOG) injection 0-9 Units, 0-9 Units, Subcutaneous, TID AC, Paulina Solano MD, 7 Units at 01/12/22 1100  •  insulin aspart (novoLOG) injection 30 Units, 30 Units, Subcutaneous, TID With Meals, Paulina Solano MD, 30 Units at 01/12/22 1102  •  insulin detemir (LEVEMIR) injection 30 Units, 30 Units, Subcutaneous, Q12H, Paulina Solano MD, 30 Units at 01/12/22 1100  •  ipratropium-albuterol (DUO-NEB) nebulizer solution 3 mL, 3 mL, Nebulization, 4x Daily - RT, Paulina Solano MD, 3 mL at 01/12/22 0731  •  isosorbide  mononitrate (IMDUR) 24 hr tablet 30 mg, 30 mg, Oral, QAM, Paulina Solano MD, 30 mg at 01/12/22 0517  •  labetalol (NORMODYNE,TRANDATE) injection 20 mg, 20 mg, Intravenous, Q6H PRN, Paulina Solano MD, 20 mg at 01/09/22 1737  •  levothyroxine (SYNTHROID, LEVOTHROID) tablet 25 mcg, 25 mcg, Oral, Daily, Paulina Solano MD, 25 mcg at 01/12/22 0808  •  lisinopril (PRINIVIL,ZESTRIL) tablet 5 mg, 5 mg, Oral, Daily, Paulina Solano MD  •  metoprolol tartrate (LOPRESSOR) tablet 50 mg, 50 mg, Oral, Q12H, Paulina Solano MD, 50 mg at 01/10/22 2048  •  montelukast (SINGULAIR) tablet 10 mg, 10 mg, Oral, Nightly, Paulina Solano MD, 10 mg at 01/11/22 2037  •  morphine injection 1 mg, 1 mg, Intravenous, Q4H PRN, Paulina Solano MD, 1 mg at 01/12/22 0629  •  nystatin (MYCOSTATIN) powder, , Topical, Q12H, Paulina Solano MD, Given at 01/11/22 2204  •  O2 (OXYGEN), 3 L/min, Inhalation, Continuous, Paulina Solano MD, Last Rate: 180,000 mL/hr at 01/09/22 1809, 3 L/min at 01/09/22 1809  •  ondansetron (ZOFRAN) injection 4 mg, 4 mg, Intravenous, Q6H PRN, Paulina Solano MD  •  oxybutynin XL (DITROPAN-XL) 24 hr tablet 5 mg, 5 mg, Oral, Daily, Paulina Solano MD, 5 mg at 01/12/22 0808  •  pantoprazole (PROTONIX) injection 40 mg, 40 mg, Intravenous, Q AM, Paulina Solano MD, 40 mg at 01/12/22 0517  •  ranolazine (RANEXA) 12 hr tablet 500 mg, 500 mg, Oral, Q12H, Paulina Solano MD, 500 mg at 01/12/22 0808  •  sodium chloride 0.9 % flush 10 mL, 10 mL, Intravenous, PRN, Paulina Solano MD  •  sodium chloride 0.9 % flush 10 mL, 10 mL, Intravenous, Once PRN, Paulina Solano MD  •  sodium chloride 0.9 % flush 10 mL, 10 mL, Intravenous, Q12H, Paulina Solano MD, 10 mL at 01/12/22 0810  •  sodium chloride 0.9 % flush 10 mL, 10 mL, Intravenous, PRN, Paulina Solano MD  •  sodium chloride 0.9 % infusion, 10 mL/hr, Intravenous, Continuous PRN, Paulina Solano,  MD     Diagnostic Data    Lab Results (last 24 hours)     Procedure Component Value Units Date/Time    POC Glucose Once [[id number]]  (Abnormal) Collected: 01/12/22 1056    Specimen: Blood Updated: 01/12/22 1115     Glucose 302 mg/dL      Comment: RN NotifiedOperator: [id number] SAMANTHA REBAMeter ID: [id number]       Phosphorus [[id number]]  (Abnormal) Collected: 01/12/22 0815    Specimen: Blood Updated: 01/12/22 0929     Phosphorus 7.0 mg/dL     POC Glucose Once [[id number]]  (Abnormal) Collected: 01/12/22 0607    Specimen: Blood Updated: 01/12/22 0627     Glucose 303 mg/dL      Comment: RN NotifiedOperator: [id number] GERA LYLEANNAMeter ID: [id number]       CBC (No Diff) [[id number]]  (Abnormal) Collected: 01/12/22 0432    Specimen: Blood Updated: 01/12/22 0515     WBC 9.07 10*3/mm3      RBC 2.99 10*6/mm3      Hemoglobin 8.6 g/dL      Hematocrit 27.0 %      MCV 90.3 fL      MCH 28.8 pg      MCHC 31.9 g/dL      RDW 15.7 %      RDW-SD 51.6 fl      MPV 9.0 fL      Platelets 202 10*3/mm3     Basic Metabolic Panel [[id number]]  (Abnormal) Collected: 01/12/22 0432    Specimen: Blood Updated: 01/12/22 0507     Glucose 313 mg/dL      BUN 57 mg/dL      Creatinine 6.73 mg/dL      Sodium 135 mmol/L      Potassium 3.6 mmol/L      Chloride 99 mmol/L      CO2 19.0 mmol/L      Calcium 7.9 mg/dL      eGFR   Amer --     Comment: <15 Indicative of kidney failure.        eGFR Non African Amer 6 mL/min/1.73      Comment: <15 Indicative of kidney failure.        BUN/Creatinine Ratio 8.5     Anion Gap 17.0 mmol/L     Narrative:      GFR Normal >60  Chronic Kidney Disease <60  Kidney Failure <15      Phosphorus [[id number]]  (Abnormal) Collected: 01/12/22 0432    Specimen: Blood Updated: 01/12/22 0500     Phosphorus 6.9 mg/dL     Blood Culture ID, PCR - Blood, Arm, Left [[id number]]  (Normal) Collected: 01/09/22 1618    Specimen: Blood from Arm, Left Updated: 01/12/22 0422     BCID, PCR Negative by BCID PCR. Culture to Follow.     Narrative:      Sensitivity to Follow    Phosphorus [[id number]]  (Abnormal) Collected: 01/12/22 0046    Specimen: Blood Updated: 01/12/22 0127     Phosphorus 6.4 mg/dL     Phosphorus [[id number]]  (Abnormal) Collected: 01/11/22 2015    Specimen: Blood Updated: 01/11/22 2111     Phosphorus 5.5 mg/dL     POC Glucose Once [[id number]]  (Abnormal) Collected: 01/11/22 2005    Specimen: Blood Updated: 01/11/22 2034     Glucose 308 mg/dL      Comment: RN NotifiedOperator: [id number] GERA ADRIANNAMeter ID: [id number]       POC Glucose Once [[id number]]  (Normal) Collected: 01/11/22 1658    Specimen: Blood Updated: 01/11/22 1841     Glucose 115 mg/dL      Comment: RN NotifiedOperator: [id number] BECCA TAKELAMeter ID: [id number]       Blood Culture - Blood, Arm, Left [[id number]]  (Normal) Collected: 01/09/22 1618    Specimen: Blood from Arm, Left Updated: 01/11/22 1631     Blood Culture No growth at 2 days    Blood Culture - Blood, Hand, Left [[id number]]  (Normal) Collected: 01/09/22 1619    Specimen: Blood from Hand, Left Updated: 01/11/22 1631     Blood Culture No growth at 2 days    Phosphorus [[id number]]  (Abnormal) Collected: 01/11/22 1536    Specimen: Blood Updated: 01/11/22 1608     Phosphorus 5.8 mg/dL     Blood Culture - Blood, Arm, Left [[id number]]  (Normal) Collected: 01/09/22 1214    Specimen: Blood from Arm, Left Updated: 01/11/22 1230     Blood Culture No growth at 2 days    Phosphorus [[id number]]  (Abnormal) Collected: 01/11/22 1147    Specimen: Blood Updated: 01/11/22 1212     Phosphorus 4.7 mg/dL     POC Glucose Once [[id number]]  (Abnormal) Collected: 01/11/22 1112    Specimen: Blood Updated: 01/11/22 1211     Glucose 145 mg/dL      Comment: RN NotifiedOperator: [id number] DARIEN ESPINOIKAMeter ID: [id number]       POC Glucose Once [[id number]]  (Abnormal) Collected: 01/11/22 0738    Specimen: Blood Updated: 01/11/22 1210     Glucose 229 mg/dL      Comment: RN NotifiedOperator: [id number] DARIEN  Estephanie ID: [id number]              Imaging Results (Last 24 Hours)     ** No results found for the last 24 hours. **          I reviewed the patient's new clinical results.    Assessment/Plan:     Active Hospital Problems    Diagnosis    • **Type 2 diabetes mellitus with kidney complication, with long-term current use of insulin (HCC)      - Levemir 60 units nightly at home. Will divide doses daily for better control  - Novolog 30 with meals  - SSI  -HgA1c 10/2021 : 8.80       • Personal history of noncompliance with medical treatment, presenting hazards to health      · Difficulty showing up to clinic visits due to complexity of medical problems and transportation  · Would really benefit from a couple home visits from PCP to help improve compliance      • COPD (chronic obstructive pulmonary disease) ([facility])      - Dependent on 3L NC at Home  - Maintain strict sats between 88-92%  -Singulair 10mg   -DuoNebs  -NIPPV via Respiratory. Patient uses Trilogy at home/night      • Anemia due to chronic kidney disease, on chronic dialysis ([facility])      - MWF Dialysis patient  - Epoetin (Retacrit) 10,000 units three times a week on dialysis days MWF  - Hg 7 on admission. Transfuse if Hg less than 7  - Type and screen done   - Daily CBC            • Unspecified diastolic (congestive) heart failure ([facility])      - Continue Imdur, metoprolol, lisinopril, bumex  - pro-BNP 64306  - Echo 1/10/2022 : EF 61-65%  -Cardiology on board (Dr. Handy) - appreciate recommendations  - MWF Dialysis patient, caution with HHS fluids      • MIKE and COPD overlap syndrome ([facility])      · Home Trilogy/CPAP  · Home 3L oxygen dependent. Maintain strict Sats between 88-92%      • Hypothyroidism      Continue home Levothyroxine 25mcg, stable     • Coronary artery disease      · S/P CABG x 3 (Primary), Bilateral carotid artery stenosis w/ 2 stents on left side,  PAD (peripheral artery disease) with stent in right upper leg  · Continue   - aspirin  - Plavix  75mg daily  - atorvastatin 20 mg daily  - lisinopril 5mg daily  -lopressor 50mg   -Imdur 30mg daily  - Ranexa - 500mg BID   · EKG - sinus tachycardia             • Hypertension        · Metoprolol 50mg bid, hold if HR < 60bpm  · Lisinopril 5mg daily  · Telemetry  · Hydralazine 10mg prn q6h for SBP > 170  · Labetalol 20mg Q6h >160             DVT prophylaxis: SCDs  Code Status and Medical Interventions:   Ordered at: 01/09/22 1508     Level Of Support Discussed With:    Next of Kin (If No Surrogate)     Code Status (Patient has no pulse and is not breathing):    CPR (Attempt to Resuscitate)     Medical Interventions (Patient has pulse or is breathing):    Full Support     Comments:    Discussed with  Huy Slater at bedside       Plan for disposition:Where: home health and When:  2-3days      Time: 25 minutes     This document has been electronically signed by Paulina Solano MD on January 12, 2022 11:51 CST    Paulina Solano MD PGY-3  Part of this note may be an electronic transcription/translation of spoken language to printed text using the Dragon Dictation System.

## 2022-01-12 NOTE — THERAPY EVALUATION
Patient Name: Yamileth Slater  : 1960    MRN: [id number]                              Today's Date: 2022       Admit Date: 2022    Visit Dx:     ICD-10-CM ICD-9-CM   1. Diabetic ketoacidosis with coma associated with other specified diabetes mellitus ([facility])  E13.11 250.32   2. Hypertension, unspecified type  I10 401.9   3. Altered mental status, unspecified altered mental status type  R41.82 780.97   4. Dysphagia, unspecified type  R13.10 787.20   5. Impaired functional mobility, balance, gait, and endurance  Z74.09 V49.89   6. Impaired mobility and ADLs  Z74.09 V49.89    Z78.9      Patient Active Problem List   Diagnosis   • Chronic obstructive pulmonary disease (HCC)   • Chronic midline low back pain without sciatica   • Vitamin D deficiency   • Tobacco dependence syndrome   • Surgical follow-up care   • Shoulder joint pain   • Peripheral vascular disease ([facility])   • Pain   • Neurologic disorder associated with diabetes mellitus ([facility])   • Morbid obesity with BMI of 50.0-59.9, adult ([facility])   • Mixed hyperlipidemia   • Kidney stone   • History of colon polyps   • GERD (gastroesophageal reflux disease)   • Excoriated eczema   • Hypertension   • Encounter for medication refill   • Dyslipidemia   • Diabetes mellitus ([facility])   • COPD (chronic obstructive pulmonary disease) ([facility])   • Chronic folliculitis   • Coronary artery disease   • Mild persistent asthma   • 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   • Hypothyroidism   • Carotid artery disease ([facility])   • Current smoker   • [place] abuse   • PAD (peripheral artery disease) ([facility])   • Intercostal pain   • Venous insufficiency   • LAFB (left anterior fascicular block)   • Pulmonary hypertension ([facility])   • Left hip pain   • Neck pain   • CKD (chronic kidney disease), symptom management only, stage 5 ([facility])   • MIKE and COPD overlap syndrome ([facility])   • Pneumonia due to COVID-19 virus   • Hyperkalemia   • Cutaneous candidiasis   • Community  acquired pneumonia of right middle lobe of lung   • MRSA infection   • Alkaline phosphatase elevation   • COVID-19 ruled out by laboratory testing   • Esophageal dysphagia   • Monilial esophagitis ([facility])   • NSTEMI, initial episode of care ([facility])   • Pneumonia due to infectious organism   • Cellulitis of left leg   • Unspecified diastolic (congestive) heart failure ([facility])   • Hyponatremia   • Urinary tract infection due to Proteus   • History of insertion of tunneled central venous catheter (CVC) with port   • Anemia due to chronic kidney disease, on chronic dialysis ([facility])   • Pneumonitis   • Personal history of noncompliance with medical treatment, presenting hazards to health   • Type 2 diabetes mellitus with kidney complication, with long-term current use of insulin ([facility])     Past Medical History:   Diagnosis Date   • Acute blood loss anemia 4/16/2017    Likely due to gastric oozing at this time. - Dr. Duarte (GI) was consulted and has now signed off, will follow up outpatient - pill colonoscopy showed AVMs - continue to monitor   • Altered mental status 1/9/2022    - AMS on presentation - initial ABG pH 7.3, CO2 34 - Procal 0.29 - UA negative for acute cysitits -CTA head wnl  - Empiric Zosyn and Vancomycin -Lactate 2.5 on admission  - blood cultures no growth at 24 hours     • Anxiety    • CAD (coronary artery disease) 4/24/2021    S/P 3 stents 5/1/2021 for [facility] Continue ASA 81mg & Clopidogrel 75mg Continue Atorvastatin 40mg   • Carotid artery stenosis    • Chronic obstructive lung disease ([facility])    • CKD (chronic kidney disease) stage 4, GFR 15-29 ml/min ([facility])    • Colonic polyp    • Coronary arteriosclerosis    • Diabetes mellitus ([facility])    • Diabetic neuropathy ([facility])    • Ear pain, right 10/18/2021    - canal trauma due to patient scratching and DMT2 - added cortisporin ear drops   • Elevated troponin 10/12/2021    -most likely from CKD -Trending down -Neg chest pain   • GERD (gastroesophageal reflux disease)    •  GI bleed 5/13/2021    - GI will follow up outpatient - Protonix 40mg daily - Avoid medical DVT prophy and use mechanical at this time instead. - Continue to monitor - pill colonoscopy results showed AVMs   • History of transfusion    • Hypercholesterolemia    • Hypertension    • Hypomagnesemia 6/27/2021    Monitor and replace   • Morbid obesity ([facility])    • Nephrolithiasis    • Peripheral vascular disease (HCC)    • SIRS (systemic inflammatory response syndrome) ([facility]) 1/9/2022    Admission  - WBC 17.78   -   - RR 16 - 1/10: VSS/wnl - CXR - Mild pulm edema - Blood cultures no growth at 24 hours  - Procalcitonin 0.29 - UA : glucose 1000, negative Leucocytes/nitrate - Empiric Zosyn and Vancomcyin    • Sleep apnea    • Substance abuse ([facility])    • Vitamin D deficiency      Past Surgical History:   Procedure Laterality Date   • CARDIAC CATHETERIZATION N/A 7/14/2020   • CARDIAC CATHETERIZATION N/A 4/23/2021    Procedure: Left Heart Cath;  Surgeon: Melba Romo MD;  Location: [facility] CATH INVASIVE LOCATION;  Service: Cardiology;  Laterality: N/A;   • CARDIAC CATHETERIZATION N/A 4/30/2021    Procedure: Percutaneous Coronary Intervention;  Surgeon: Russell Voss MD;  Location: [facility] CATH INVASIVE LOCATION;  Service: Cardiovascular;  Laterality: N/A;   • CARDIAC CATHETERIZATION N/A 4/30/2021    Procedure: Stent NIKKI coronary;  Surgeon: Russell Voss MD;  Location: [facility] CATH INVASIVE LOCATION;  Service: Cardiovascular;  Laterality: N/A;   • CARDIAC CATHETERIZATION Left 11/13/2021    Procedure: Left Heart Cath;  Surgeon: Niall Rios MD;  Location: [facility] CATH INVASIVE LOCATION;  Service: Cardiology;  Laterality: Left;   • CAROTID STENT Left    • COLONOSCOPY     • COLONOSCOPY N/A 5/14/2021    Procedure: COLONOSCOPY;  Surgeon: Mingo Duarte MD;  Location: [facility] ENDOSCOPY;  Service: Gastroenterology;  Laterality: N/A;   • CORONARY ARTERY BYPASS GRAFT N/A 2013    CABG X 3   • CYSTOSCOPY  BLADDER STONE LITHOTRIPSY Bilateral    • ENDOSCOPY N/A 4/12/2021    Procedure: ESOPHAGOGASTRODUODENOSCOPY;  Surgeon: Mingo Duarte MD;  Location: [facility] ENDOSCOPY;  Service: Gastroenterology;  Laterality: N/A;   • ENDOSCOPY N/A 5/14/2021    Procedure: ESOPHAGOGASTRODUODENOSCOPY;  Surgeon: Mingo Duarte MD;  Location: [facility] ENDOSCOPY;  Service: Gastroenterology;  Laterality: N/A;   • INTERVENTIONAL RADIOLOGY PROCEDURE N/A 10/21/2021    Procedure: tunneled central venous catheter placement;  Surgeon: Donnie Robles MD;  Location: [facility] ANGIO INVASIVE LOCATION;  Service: Interventional Radiology;  Laterality: N/A;      General Information     [facility] Name 01/12/22 1512          OT Time and Intention    Document Type evaluation  -     Mode of Treatment occupational therapy; individual therapy  -[facility] Name 01/12/22 1512          General Information    Patient Profile Reviewed yes  -     Prior Level of Function independent:; feeding; grooming; min assist:; transfer; gait; mod assist:; dressing; bathing; dependent:; home management; cooking; cleaning; driving; shopping  -[facility] Name 01/12/22 1512          Living Environment    Lives With spouse  -[facility] Name 01/12/22 1512          Home Main Entrance    Number of Stairs, Main Entrance none  -[facility] Name 01/12/22 1512          Stairs Within Home, Primary    Stairs, Within Home, Primary apartment, 1st floor. has a tub with a shower chair. Comfort height toilet. Assist with dressing and bathing. Patient manages meds, finances.  does cooking, cleaning, driving. For transfers,  always provides CGA. She uses a rollator at all times as well. Does have a RW at home.  -     Row Name 01/12/22 1512          Cognition    Orientation Status (Cognition) unable/difficult to assess  -[facility] Name 01/12/22 1512          Safety Issues, Functional Mobility    Safety Issues Affecting Function (Mobility) ability to follow commands; safety  precaution awareness; safety precautions follow-through/compliance; insight into deficits/self-awareness  -     Impairments Affecting Function (Mobility) balance; coordination; endurance/activity tolerance; strength; shortness of breath  -           User Key  (r) = Recorded By, (t) = Taken By, (c) = Cosigned By    Initials Name Provider Type     Ottoniel Robles, OT Occupational Therapist                 Mobility/ADL's     Row Name 01/12/22 1512          Bed Mobility    Bed Mobility sit-supine; scooting/bridging  -     Scooting/Bridging [place] (Bed Mobility) dependent (less than 25% patient effort); 2 person assist  -     Supine-Sit [place] (Bed Mobility) maximum assist (25% patient effort)  -     Sit-Supine [place] (Bed Mobility) maximum assist (25% patient effort)  -     Assistive Device (Bed Mobility) bed rails; head of bed elevated; draw sheet  -[facility] Name 01/12/22 1512          Transfers    Transfers sit-stand transfer  -     Sit-Stand [place] (Transfers) minimum assist (75% patient effort)  -SJ     Row Name 01/12/22 1512          Sit-Stand Transfer    Assistive Device (Sit-Stand Transfers) walker, front-wheeled  -     Row Name 01/12/22 1512          Activities of Daily Living    BADL Assessment/Intervention grooming  -[facility] Name 01/12/22 1512          Grooming Assessment/Training    [place] Level (Grooming) set up  -     Comment (Grooming) washing hands and face with wash cloth supine  -           User Key  (r) = Recorded By, (t) = Taken By, (c) = Cosigned By    Initials Name Provider Type     Ottoniel Robles OT Occupational Therapist               Obj/Interventions     Row Name 01/12/22 1512          Sensory Assessment (Somatosensory)    Sensory Assessment (Somatosensory) UE sensation intact  -[facility] Name 01/12/22 1512          Range of Motion Comprehensive    General Range of Motion bilateral upper extremity ROM WFL  -[facility] Name 01/12/22  1512          Strength Comprehensive (MMT)    Comment, General Manual Muscle Testing (MMT) Assessment BUE 4/5 grossly  -SJ           User Key  (r) = Recorded By, (t) = Taken By, (c) = Cosigned By    Initials Name Provider Type    Ottoniel Pardo, OT Occupational Therapist               Goals/Plan     Row Name 01/12/22 1522          Transfer Goal 1 (OT)    Activity/Assistive Device (Transfer Goal 1, OT) toilet  -SJ     [place] Level/Cues Needed (Transfer Goal 1, OT) standby assist  -SJ     Time Frame (Transfer Goal 1, OT) long term goal (LTG); by discharge  -SJ     Progress/Outcome (Transfer Goal 1, OT) goal not met  -[facility] Name 01/12/22 1522          Bathing Goal 1 (OT)    Activity/Device (Bathing Goal 1, OT) lower body bathing  -SJ     [place] Level/Cues Needed (Bathing Goal 1, OT) minimum assist (75% or more patient effort)  -SJ     Time Frame (Bathing Goal 1, OT) long term goal (LTG); by discharge  -     Progress/Outcomes (Bathing Goal 1, OT) goal not met  -[facility] Name 01/12/22 1522          Dressing Goal 1 (OT)    Activity/Device (Dressing Goal 1, OT) lower body dressing  -SJ     [place]/Cues Needed (Dressing Goal 1, OT) minimum assist (75% or more patient effort)  -SJ     Time Frame (Dressing Goal 1, OT) long term goal (LTG); by discharge  -     Progress/Outcome (Dressing Goal 1, OT) goal not met  -[facility] Name 01/12/22 1522          Toileting Goal 1 (OT)    Activity/Device (Toileting Goal 1, OT) toileting skills, all  -SJ     [place] Level/Cues Needed (Toileting Goal 1, OT) minimum assist (75% or more patient effort)  -SJ     Time Frame (Toileting Goal 1, OT) long term goal (LTG); by discharge  -     Progress/Outcome (Toileting Goal 1, OT) goal not met  -[facility] Name 01/12/22 1522          Therapy Assessment/Plan (OT)    Planned Therapy Interventions (OT) activity tolerance training; adaptive equipment training; BADL retraining; cognitive/visual perception  retraining; edema control/reduction; functional balance retraining; IADL retraining; manual therapy/joint mobilization; neuromuscular control/coordination retraining; occupation/activity based interventions; passive ROM/stretching; patient/caregiver education/training; ROM/therapeutic exercise; strengthening exercise; transfer/mobility retraining  -           User Key  (r) = Recorded By, (t) = Taken By, (c) = Cosigned By    Initials Name Provider Type     Ottoniel Robles, OT Occupational Therapist               Clinical Impression     Row Name 01/12/22 1512          Pain Scale: FACES Pre/Post-Treatment    Pain: FACES Scale, Pretreatment 0-->no hurt  -SJ     Posttreatment Pain Rating 0-->no hurt  -SJ     Row Name 01/12/22 1512          Plan of Care Review    Outcome Summary OT eval complete. Supine in bed upon arrival. Patient does follow one and two step commands. Patient non-verbal this session, struggling to state her first and last name, but attempting to state it. History provided by  who was present in room. Supine <> sit with max A. Sit to stand with min A and RW, did take one step towards HOB. Dependent for scooting up in bed.  Patient with decreased safety awarenss, decreased activity tolerance, decreased safety in ADLs, and decreased safety in transfers. Recommend rehab prior to return home, dispo may change pending patient progress. Cont inpatient OT to work on self-care, transfers, and conditioning.  -     Row Name 01/12/22 1512          Therapy Assessment/Plan (OT)    Patient/Family Therapy Goal Statement (OT) family goal is to return home  -     Rehab Potential (OT) good, to achieve stated therapy goals  -     Criteria for Skilled Therapeutic Interventions Met (OT) yes; skilled treatment is necessary  -     Therapy Frequency (OT) other (see comments)  5-7 d/wk  -     Predicted Duration of Therapy Intervention (OT) until discharge or all goals met  -     Row Name 01/12/22 1512           Therapy Plan Review/Discharge Plan (OT)    Anticipated Discharge Disposition (OT) skilled nursing [facility]  -     Row Name 01/12/22 1512          Vital Signs    Pre Patient Position Supine  -SJ     Post Patient Position Supine  -     Row Name 01/12/22 1512          Positioning and Restraints    Pre-Treatment Position in bed  -SJ     Post Treatment Position bed  -SJ     In Bed notified nsg; sitting EOB; call light within reach; encouraged to call for assist; exit alarm on  -SJ           User Key  (r) = Recorded By, (t) = Taken By, (c) = Cosigned By    Initials Name Provider Type     Ottoniel Robles, OT Occupational Therapist               Outcome Measures     Row Name 01/12/22 1522          How much help from another is currently needed...    Putting on and taking off regular lower body clothing? 1  -SJ     Bathing (including washing, rinsing, and drying) 1  -SJ     Toileting (which includes using toilet bed pan or urinal) 1  -SJ     Putting on and taking off regular upper body clothing 2  -SJ     Taking care of personal grooming (such as brushing teeth) 3  -SJ     Eating meals 3  -SJ     AM-PAC 6 Clicks Score (OT) 11  -     Row Name 01/12/22 1347          How much help from another person do you currently need...    Turning from your back to your side while in flat bed without using bedrails? 3  -LR     Moving from lying on back to sitting on the side of a flat bed without bedrails? 3  -LR     Moving to and from a bed to a chair (including a wheelchair)? 3  -LR     Standing up from a chair using your arms (e.g., wheelchair, bedside chair)? 3  -LR     Climbing 3-5 steps with a railing? 3  -LR     To walk in hospital room? 3  -LR     AM-PAC 6 Clicks Score (PT) 18  -LR     Row Name 01/12/22 1522 01/12/22 1347       Functional Assessment    Outcome Measure Options AM-PAC 6 Clicks Daily Activity (OT)  - AM-PAC 6 Clicks Basic Mobility (PT)  -LR          User Key  (r) = Recorded By, (t) = Taken By, (c) =  Cosigned By    Initials Name Provider Type    LR Lucien Gilman Physical Therapist     Ottoniel Robles OT Occupational Therapist                Occupational Therapy Education                 Title: PT OT SLP Therapies (In Progress)     Topic: Occupational Therapy (In Progress)     Point: ADL training (Not Started)     Description:   Instruct learner(s) on proper safety adaptation and remediation techniques during self care or transfers.   Instruct in proper use of assistive devices.              Learner Progress:  Not documented in this visit.          Point: Home exercise program (Not Started)     Description:   Instruct learner(s) on appropriate technique for monitoring, assisting and/or progressing therapeutic exercises/activities.              Learner Progress:  Not documented in this visit.          Point: Precautions (Done)     Description:   Instruct learner(s) on prescribed precautions during self-care and functional transfers.              Learning Progress Summary           Patient Acceptance, E,TB, VU by  at 1/12/2022 1553    Comment: POC, role of OT, transfer training   Family Acceptance, E,TB, VU by  at 1/12/2022 1553    Comment: POC, role of OT, transfer training                   Point: Body mechanics (Done)     Description:   Instruct learner(s) on proper positioning and spine alignment during self-care, functional mobility activities and/or exercises.              Learning Progress Summary           Patient Acceptance, E,TB, VU by  at 1/12/2022 1553    Comment: POC, role of OT, transfer training   Family Acceptance, E,TB, VU by  at 1/12/2022 1553    Comment: POC, role of OT, transfer training                               User Key     Initials Effective Dates Name Provider Type Discipline     06/14/21 -  Ottoniel Robles OT Occupational Therapist OT              OT Recommendation and Plan  Planned Therapy Interventions (OT): activity tolerance training, adaptive equipment training,  BADL retraining, cognitive/visual perception retraining, edema control/reduction, functional balance retraining, IADL retraining, manual therapy/joint mobilization, neuromuscular control/coordination retraining, occupation/activity based interventions, passive ROM/stretching, patient/caregiver education/training, ROM/therapeutic exercise, strengthening exercise, transfer/mobility retraining  Therapy Frequency (OT): other (see comments) (5-7 d/wk)  Plan of Care Review  Outcome Summary: OT eval complete. Supine in bed upon arrival. Patient does follow one and two step commands. Patient non-verbal this session, struggling to state her first and last name, but attempting to state it. History provided by  who was present in room. Supine <> sit with max A. Sit to stand with min A and RW, did take one step towards HOB. Dependent for scooting up in bed.  Patient with decreased safety awarenss, decreased activity tolerance, decreased safety in ADLs, and decreased safety in transfers. Recommend rehab prior to return home, dispo may change pending patient progress. Cont inpatient OT to work on self-care, transfers, and conditioning.     Time Calculation:    Time Calculation- OT     Row Name 01/12/22 1555             Time Calculation- OT    OT Start Time 1512  -      OT Stop Time 1550  -      OT Time Calculation (min) 38 min  -SJ      OT Received On 01/12/22  -      OT Goal Re-Cert Due Date 01/25/22  -              Untimed Charges    OT Eval/Re-eval Minutes 38  -SJ              Total Minutes    Untimed Charges Total Minutes 38  -SJ       Total Minutes 38  -SJ            User Key  (r) = Recorded By, (t) = Taken By, (c) = Cosigned By    Initials Name Provider Type     Ottoniel Robles OT Occupational Therapist              Therapy Charges for Today     Code Description Service Date Service Provider Modifiers Qty    [id number]  OT THER SUPP EA 15 MIN 1/12/2022 Ottoniel Robles OT GO 1    [id number]  OT  EVAL MOD COMPLEXITY 3 1/12/2022 Ottoniel Robles, OT GO 1               Ottoniel Robles OT  1/12/2022

## 2022-01-12 NOTE — PLAN OF CARE
Goal Outcome Evaluation:        Outcome Summary: OT eval complete. Supine in bed upon arrival. Patient does follow one and two step commands. Patient non-verbal this session, struggling to state her first and last name, but attempting to state it. History provided by  who was present in room. Supine <> sit with max A. Sit to stand with min A and RW, did take one step towards HOB. Dependent for scooting up in bed.  Patient with decreased safety awarenss, decreased activity tolerance, decreased safety in ADLs, and decreased safety in transfers. Recommend rehab prior to return home, dispo may change pending patient progress. Cont inpatient OT to work on self-care, transfers, and conditioning.

## 2022-01-12 NOTE — THERAPY EVALUATION
Patient Name: Yamileth Slater  : 1960    MRN: [id number]                              Today's Date: 2022       Admit Date: 2022    Visit Dx:     ICD-10-CM ICD-9-CM   1. Diabetic ketoacidosis with coma associated with other specified diabetes mellitus ([facility])  E13.11 250.32   2. Hypertension, unspecified type  I10 401.9   3. Altered mental status, unspecified altered mental status type  R41.82 780.97   4. Dysphagia, unspecified type  R13.10 787.20   5. Impaired functional mobility, balance, gait, and endurance  Z74.09 V49.89     Patient Active Problem List   Diagnosis   • Chronic obstructive pulmonary disease (HCC)   • Chronic midline low back pain without sciatica   • Vitamin D deficiency   • Tobacco dependence syndrome   • Surgical follow-up care   • Shoulder joint pain   • Peripheral vascular disease ([facility])   • Pain   • Neurologic disorder associated with diabetes mellitus ([facility])   • Morbid obesity with BMI of 50.0-59.9, adult ([facility])   • Mixed hyperlipidemia   • Kidney stone   • History of colon polyps   • GERD (gastroesophageal reflux disease)   • Excoriated eczema   • Hypertension   • Encounter for medication refill   • Dyslipidemia   • Diabetes mellitus ([facility])   • COPD (chronic obstructive pulmonary disease) ([facility])   • Chronic folliculitis   • Coronary artery disease   • Mild persistent asthma   • 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   • Hypothyroidism   • Carotid artery disease ([facility])   • Current smoker   • [place] abuse   • PAD (peripheral artery disease) ([facility])   • Intercostal pain   • Venous insufficiency   • LAFB (left anterior fascicular block)   • Pulmonary hypertension ([facility])   • Left hip pain   • Neck pain   • CKD (chronic kidney disease), symptom management only, stage 5 ([facility])   • MIKE and COPD overlap syndrome ([facility])   • Pneumonia due to COVID-19 virus   • Hyperkalemia   • Cutaneous candidiasis   • Community acquired pneumonia of right middle lobe of lung   • MRSA  infection   • Alkaline phosphatase elevation   • COVID-19 ruled out by laboratory testing   • Esophageal dysphagia   • Monilial esophagitis ([facility])   • NSTEMI, initial episode of care ([facility])   • Pneumonia due to infectious organism   • Cellulitis of left leg   • Unspecified diastolic (congestive) heart failure ([facility])   • Hyponatremia   • Urinary tract infection due to Proteus   • History of insertion of tunneled central venous catheter (CVC) with port   • Anemia due to chronic kidney disease, on chronic dialysis ([facility])   • Pneumonitis   • Personal history of noncompliance with medical treatment, presenting hazards to health   • Type 2 diabetes mellitus with kidney complication, with long-term current use of insulin ([facility])     Past Medical History:   Diagnosis Date   • Acute blood loss anemia 4/16/2017    Likely due to gastric oozing at this time. - Dr. Duarte (GI) was consulted and has now signed off, will follow up outpatient - pill colonoscopy showed AVMs - continue to monitor   • Altered mental status 1/9/2022    - AMS on presentation - initial ABG pH 7.3, CO2 34 - Procal 0.29 - UA negative for acute cysitits -CTA head wnl  - Empiric Zosyn and Vancomycin -Lactate 2.5 on admission  - blood cultures no growth at 24 hours     • Anxiety    • CAD (coronary artery disease) 4/24/2021    S/P 3 stents 5/1/2021 for [facility] Continue ASA 81mg & Clopidogrel 75mg Continue Atorvastatin 40mg   • Carotid artery stenosis    • Chronic obstructive lung disease ([facility])    • CKD (chronic kidney disease) stage 4, GFR 15-29 ml/min ([facility])    • Colonic polyp    • Coronary arteriosclerosis    • Diabetes mellitus ([facility])    • Diabetic neuropathy ([facility])    • Ear pain, right 10/18/2021    - canal trauma due to patient scratching and DMT2 - added cortisporin ear drops   • Elevated troponin 10/12/2021    -most likely from CKD -Trending down -Neg chest pain   • GERD (gastroesophageal reflux disease)    • GI bleed 5/13/2021    - GI will follow up outpatient -  Protonix 40mg daily - Avoid medical DVT prophy and use mechanical at this time instead. - Continue to monitor - pill colonoscopy results showed AVMs   • History of transfusion    • Hypercholesterolemia    • Hypertension    • Hypomagnesemia 6/27/2021    Monitor and replace   • Morbid obesity ([facility])    • Nephrolithiasis    • Peripheral vascular disease (HCC)    • SIRS (systemic inflammatory response syndrome) ([facility]) 1/9/2022    Admission  - WBC 17.78   -   - RR 16 - 1/10: VSS/wnl - CXR - Mild pulm edema - Blood cultures no growth at 24 hours  - Procalcitonin 0.29 - UA : glucose 1000, negative Leucocytes/nitrate - Empiric Zosyn and Vancomcyin    • Sleep apnea    • Substance abuse ([facility])    • Vitamin D deficiency      Past Surgical History:   Procedure Laterality Date   • CARDIAC CATHETERIZATION N/A 7/14/2020   • CARDIAC CATHETERIZATION N/A 4/23/2021    Procedure: Left Heart Cath;  Surgeon: Melba Romo MD;  Location: [facility] CATH INVASIVE LOCATION;  Service: Cardiology;  Laterality: N/A;   • CARDIAC CATHETERIZATION N/A 4/30/2021    Procedure: Percutaneous Coronary Intervention;  Surgeon: Russell Voss MD;  Location: [facility] CATH INVASIVE LOCATION;  Service: Cardiovascular;  Laterality: N/A;   • CARDIAC CATHETERIZATION N/A 4/30/2021    Procedure: Stent NIKKI coronary;  Surgeon: Russell Voss MD;  Location: [facility] CATH INVASIVE LOCATION;  Service: Cardiovascular;  Laterality: N/A;   • CARDIAC CATHETERIZATION Left 11/13/2021    Procedure: Left Heart Cath;  Surgeon: Niall Rios MD;  Location: [facility] CATH INVASIVE LOCATION;  Service: Cardiology;  Laterality: Left;   • CAROTID STENT Left    • COLONOSCOPY     • COLONOSCOPY N/A 5/14/2021    Procedure: COLONOSCOPY;  Surgeon: Mingo Duarte MD;  Location: [facility] ENDOSCOPY;  Service: Gastroenterology;  Laterality: N/A;   • CORONARY ARTERY BYPASS GRAFT N/A 2013    CABG X 3   • CYSTOSCOPY BLADDER STONE LITHOTRIPSY Bilateral    • ENDOSCOPY N/A  4/12/2021    Procedure: ESOPHAGOGASTRODUODENOSCOPY;  Surgeon: Mingo Duarte MD;  Location: [facility] ENDOSCOPY;  Service: Gastroenterology;  Laterality: N/A;   • ENDOSCOPY N/A 5/14/2021    Procedure: ESOPHAGOGASTRODUODENOSCOPY;  Surgeon: Mingo Duarte MD;  Location: [facility] ENDOSCOPY;  Service: Gastroenterology;  Laterality: N/A;   • INTERVENTIONAL RADIOLOGY PROCEDURE N/A 10/21/2021    Procedure: tunneled central venous catheter placement;  Surgeon: Donnie Robles MD;  Location: [facility] ANGIO INVASIVE LOCATION;  Service: Interventional Radiology;  Laterality: N/A;      General Information     Row Name 01/12/22 1347          Physical Therapy Time and Intention    Document Type evaluation  -LR     Mode of Treatment individual therapy; physical therapy  -LR     Row Name 01/12/22 1347          General Information    Patient Profile Reviewed yes  -LR     Prior Level of Function independent:; all household mobility; gait; transfer  -LR     Existing Precautions/Restrictions fall  -LR     Barriers to Rehab previous functional deficit; cognitive status  -LR     Row Name 01/12/22 1347          Living Environment    Lives With spouse  -LR     Row Name 01/12/22 1347          Stairs Within Home, Primary    Stairs, Within Home, Primary Unable to get home information as patient is answering yes/no questions with delayed response.  -LR     Row Name 01/12/22 1347          Cognition    Orientation Status (Cognition) unable/difficult to assess  -LR     Row Name 01/12/22 1347          Safety Issues, Functional Mobility    Safety Issues Affecting Function (Mobility) ability to follow commands; at risk behavior observed; safety precaution awareness; safety precautions follow-through/compliance; awareness of need for assistance; insight into deficits/self-awareness; positioning of assistive device  -LR     Impairments Affecting Function (Mobility) balance; coordination; endurance/activity tolerance; strength; shortness of  breath  -LR           User Key  (r) = Recorded By, (t) = Taken By, (c) = Cosigned By    Initials Name Provider Type    LR Lucien Gilman Physical Therapist               Mobility     Row Name 01/12/22 1347          Bed Mobility    Bed Mobility sit-supine; scooting/bridging  -LR     Scooting/Bridging [place] (Bed Mobility) dependent (less than 25% patient effort); 2 person assist  -LR     Sit-Supine [place] (Bed Mobility) contact guard  -LR     Assistive Device (Bed Mobility) bed rails; head of bed elevated; draw sheet  -LR     Row Name 01/12/22 1347          Sit-Stand Transfer    Sit-Stand [place] (Transfers) minimum assist (75% patient effort)  -LR     Row Name 01/12/22 1347          Gait/Stairs (Locomotion)    [place] Level (Gait) contact guard  -LR     Distance in Feet (Gait) 2'x2  -LR     Deviations/Abnormal Patterns (Gait) base of support, wide; nasir decreased; festinating/shuffling; gait speed decreased; stride length decreased  -LR     Bilateral Gait Deviations forward flexed posture  -LR           User Key  (r) = Recorded By, (t) = Taken By, (c) = Cosigned By    Initials Name Provider Type    LR Lucien Gilman Physical Therapist               Obj/Interventions     Row Name 01/12/22 1347          Range of Motion Comprehensive    General Range of Motion no range of motion deficits identified  -LR     Row Name 01/12/22 1347          Strength Comprehensive (MMT)    Comment, General Manual Muscle Testing (MMT) Assessment BLE grossly 4-/5  -LR     Row Name 01/12/22 1347          Sensory Assessment (Somatosensory)    Sensory Assessment (Somatosensory) unable/difficult to assess  -LR           User Key  (r) = Recorded By, (t) = Taken By, (c) = Cosigned By    Initials Name Provider Type    LR Lucien Gilman Physical Therapist               Goals/Plan     Row Name 01/12/22 1347          Bed Mobility Goal 1 (PT)    Activity/Assistive Device (Bed Mobility Goal 1, PT) sit to supine; supine to  sit  -LR     [place] Level/Cues Needed (Bed Mobility Goal 1, PT) modified independence  -LR     Time Frame (Bed Mobility Goal 1, PT) by discharge  -LR     Progress/Outcomes (Bed Mobility Goal 1, PT) goal not met  -LR     Row Name 01/12/22 1347          Transfer Goal 1 (PT)    Activity/Assistive Device (Transfer Goal 1, PT) sit-to-stand/stand-to-sit; bed-to-chair/chair-to-bed  -LR     [place] Level/Cues Needed (Transfer Goal 1, PT) standby assist  -LR     Time Frame (Transfer Goal 1, PT) by discharge  -LR     Progress/Outcome (Transfer Goal 1, PT) goal not met  -LR     Row Name 01/12/22 1347          Gait Training Goal 1 (PT)    Activity/Assistive Device (Gait Training Goal 1, PT) gait (walking locomotion); assistive device use  -LR     [place] Level (Gait Training Goal 1, PT) standby assist  -LR     Distance (Gait Training Goal 1, PT) 50'x2  -LR     Time Frame (Gait Training Goal 1, PT) by discharge  -LR     Progress/Outcome (Gait Training Goal 1, PT) goal not met  -LR           User Key  (r) = Recorded By, (t) = Taken By, (c) = Cosigned By    Initials Name Provider Type    LR Lucien Gilman Physical Therapist               Clinical Impression     Row Name 01/12/22 1347          Pain    Additional Documentation Pain Scale: FACES Pre/Post-Treatment (Group)  -LR     Row Name 01/12/22 1347          Pain Scale: Numbers Pre/Post-Treatment    Pretreatment Pain Rating 0/10 - no pain  -LR     Posttreatment Pain Rating 0/10 - no pain  -LR     Row Name 01/12/22 1347          Pain Scale: FACES Pre/Post-Treatment    Pain: FACES Scale, Pretreatment 0-->no hurt  -LR     Posttreatment Pain Rating 0-->no hurt  -LR     Row Name 01/12/22 1349          Plan of Care Review    Plan of Care Reviewed With patient  -LR     Outcome Summary PT eval completed on this date. Patient nonverbal at ths time and only answering yes/no questions with significant delay in responses. Unable to get home information at this time Bed  mobility: CGA for sit>supine transfer with HOB up and bed rails.  Transfers: MinAx1 sit<>stand transfer with RW. Gait: CGA for ambulation 2'x1 with RW. ambulation limited by O2 line and space in room.  -LR     Row Name 01/12/22 5582          Therapy Assessment/Plan (PT)    Patient/Family Therapy Goals Statement (PT) Patient wants to return home.  -LR     Rehab Potential (PT) good, to achieve stated therapy goals  -LR     Criteria for Skilled Interventions Met (PT) yes; meets criteria; skilled treatment is necessary  -LR     Predicted Duration of Therapy Intervention (PT) Until d/c or until all goals met  -LR     Row Name 01/12/22 1349          Vital Signs    Pre Systolic BP Rehab 107  -LR     Pre Treatment Diastolic BP 59  -LR     Pretreatment Heart Rate (beats/min) 92  -LR     Pre SpO2 (%) 98  -LR     O2 Delivery Pre Treatment supplemental O2  -LR     Pre Patient Position Sitting  -LR     Row Name 01/12/22 6446          Positioning and Restraints    Pre-Treatment Position in bed  -LR     Post Treatment Position bed  -LR     In Bed notified nsg; sitting EOB; call light within reach; encouraged to call for assist; exit alarm on  -LR           User Key  (r) = Recorded By, (t) = Taken By, (c) = Cosigned By    Initials Name Provider Type    LR Lucien Gilman Physical Therapist               Outcome Measures     Row Name 01/12/22 6859          How much help from another person do you currently need...    Turning from your back to your side while in flat bed without using bedrails? 3  -LR     Moving from lying on back to sitting on the side of a flat bed without bedrails? 3  -LR     Moving to and from a bed to a chair (including a wheelchair)? 3  -LR     Standing up from a chair using your arms (e.g., wheelchair, bedside chair)? 3  -LR     Climbing 3-5 steps with a railing? 3  -LR     To walk in hospital room? 3  -LR     AM-PAC 6 Clicks Score (PT) 18  -LR     Row Name 01/12/22 1677          Functional Assessment     Outcome Measure Options AM-PAC 6 Clicks Basic Mobility (PT)  -LR           User Key  (r) = Recorded By, (t) = Taken By, (c) = Cosigned By    Initials Name Provider Type    LR Lucien Gilman Physical Therapist                             Physical Therapy Education                 Title: PT OT SLP Therapies (In Progress)     Topic: Physical Therapy (In Progress)     Point: Mobility training (In Progress)     Learning Progress Summary           Patient Acceptance, E,TB, NR by LR at 1/12/2022 1416    Comment: Educated on PT POC and goals.                   Point: Home exercise program (In Progress)     Learning Progress Summary           Patient Acceptance, E,TB, NR by LR at 1/12/2022 1416    Comment: Educated on PT POC and goals.                   Point: Body mechanics (In Progress)     Learning Progress Summary           Patient Acceptance, E,TB, NR by LR at 1/12/2022 1416    Comment: Educated on PT POC and goals.                   Point: Precautions (In Progress)     Learning Progress Summary           Patient Acceptance, E,TB, NR by LR at 1/12/2022 1416    Comment: Educated on PT POC and goals.                               User Key     Initials Effective Dates Name Provider Type Discipline    LR 06/16/21 -  Lucien Gilman Physical Therapist PT              PT Recommendation and Plan  Planned Therapy Interventions (PT): balance training, neuromuscular re-education, bed mobility training, transfer training, orthotic fitting/training, gait training, home exercise program, vestibular therapy, patient/family education, wheelchair management/propulsion training, postural re-education, joint mobilization, prosthetic fitting/training, ROM (range of motion), manual therapy techniques, lumbar stabilization, stair training, strengthening, stretching  Plan of Care Reviewed With: patient  Outcome Summary: PT eval completed on this date. Patient nonverbal at ths time and only answering yes/no questions with significant delay  in responses. Unable to get home information at this time Bed mobility: CGA for sit>supine transfer with HOB up and bed rails.  Transfers: MinAx1 sit<>stand transfer with RW. Gait: CGA for ambulation 2'x1 with RW. ambulation limited by O2 line and space in room.     Time Calculation:    PT Charges     Row Name 01/12/22 1422             Time Calculation    Start Time 1344  -LR      Stop Time 1422  -LR      Time Calculation (min) 38 min  -LR      PT Received On 01/12/22  -LR      PT Goal Re-Cert Due Date 01/25/22  -LR              Untimed Charges    PT Eval/Re-eval Minutes 38  -LR              Total Minutes    Untimed Charges Total Minutes 38  -LR       Total Minutes 38  -LR            User Key  (r) = Recorded By, (t) = Taken By, (c) = Cosigned By    Initials Name Provider Type    LR Lucien Gilman Physical Therapist              Therapy Charges for Today     Code Description Service Date Service Provider Modifiers Qty    80347353419 HC PT EVAL MOD COMPLEXITY 3 1/12/2022 Lucien Gilman GP 1          PT G-Codes  Outcome Measure Options: AM-PAC 6 Clicks Basic Mobility (PT)  AM-PAC 6 Clicks Score (PT): 18    Lucien Gilman  1/12/2022

## 2022-01-12 NOTE — PLAN OF CARE
Goal Outcome Evaluation:  Plan of Care Reviewed With: patient           Outcome Summary: PT eval completed on this date. Patient nonverbal at ths time and only answering yes/no questions with significant delay in responses. Unable to get home information at this time Bed mobility: CGA for sit>supine transfer with HOB up and bed rails.  Transfers: MinAx1 sit<>stand transfer with RW. Gait: CGA for ambulation 2'x1 with RW. ambulation limited by O2 line and space in room.

## 2022-01-13 ENCOUNTER — APPOINTMENT (OUTPATIENT)
Dept: GENERAL RADIOLOGY | Facility: HOSPITAL | Age: 62
End: 2022-01-13

## 2022-01-13 ENCOUNTER — APPOINTMENT (OUTPATIENT)
Dept: MRI IMAGING | Facility: HOSPITAL | Age: 62
End: 2022-01-13

## 2022-01-13 ENCOUNTER — APPOINTMENT (OUTPATIENT)
Dept: CT IMAGING | Facility: HOSPITAL | Age: 62
End: 2022-01-13

## 2022-01-13 PROBLEM — N30.01 ACUTE CYSTITIS WITH HEMATURIA: Status: ACTIVE | Noted: 2021-03-31

## 2022-01-13 PROBLEM — R53.81 PHYSICAL DECONDITIONING: Status: ACTIVE | Noted: 2022-01-13

## 2022-01-13 LAB
ANION GAP SERPL CALCULATED.3IONS-SCNC: 13 MMOL/L (ref 5–15)
BACTERIA SPEC AEROBE CULT: ABNORMAL
BACTERIA UR QL AUTO: ABNORMAL /HPF
BILIRUB UR QL STRIP: NEGATIVE
BUN SERPL-MCNC: 43 MG/DL (ref 8–23)
BUN/CREAT SERPL: 7.3 (ref 7–25)
CALCIUM SPEC-SCNC: 8.3 MG/DL (ref 8.6–10.5)
CHLORIDE SERPL-SCNC: 97 MMOL/L (ref 98–107)
CLARITY UR: ABNORMAL
CO2 SERPL-SCNC: 22 MMOL/L (ref 22–29)
COLOR UR: YELLOW
CREAT SERPL-MCNC: 5.91 MG/DL (ref 0.57–1)
DEPRECATED RDW RBC AUTO: 48.4 FL (ref 37–54)
ERYTHROCYTE [DISTWIDTH] IN BLOOD BY AUTOMATED COUNT: 15.2 % (ref 12.3–15.4)
GFR SERPL CREATININE-BSD FRML MDRD: 7 ML/MIN/1.73
GFR SERPL CREATININE-BSD FRML MDRD: ABNORMAL ML/MIN/{1.73_M2}
GLUCOSE BLDC GLUCOMTR-MCNC: 100 MG/DL (ref 70–130)
GLUCOSE BLDC GLUCOMTR-MCNC: 137 MG/DL (ref 70–130)
GLUCOSE BLDC GLUCOMTR-MCNC: 137 MG/DL (ref 70–130)
GLUCOSE BLDC GLUCOMTR-MCNC: 169 MG/DL (ref 70–130)
GLUCOSE BLDC GLUCOMTR-MCNC: 191 MG/DL (ref 70–130)
GLUCOSE SERPL-MCNC: 129 MG/DL (ref 65–99)
GLUCOSE UR STRIP-MCNC: ABNORMAL MG/DL
GRAM STN SPEC: ABNORMAL
GRAN CASTS URNS QL MICRO: ABNORMAL /LPF
HCT VFR BLD AUTO: 26.6 % (ref 34–46.6)
HGB BLD-MCNC: 8.5 G/DL (ref 12–15.9)
HGB UR QL STRIP.AUTO: ABNORMAL
HYALINE CASTS UR QL AUTO: ABNORMAL /LPF
ISOLATED FROM: ABNORMAL
KETONES UR QL STRIP: ABNORMAL
LEUKOCYTE ESTERASE UR QL STRIP.AUTO: ABNORMAL
MCH RBC QN AUTO: 28 PG (ref 26.6–33)
MCHC RBC AUTO-ENTMCNC: 32 G/DL (ref 31.5–35.7)
MCV RBC AUTO: 87.5 FL (ref 79–97)
NITRITE UR QL STRIP: NEGATIVE
PH UR STRIP.AUTO: <=5 [PH] (ref 5–9)
PHOSPHATE SERPL-MCNC: 6 MG/DL (ref 2.5–4.5)
PLATELET # BLD AUTO: 220 10*3/MM3 (ref 140–450)
PMV BLD AUTO: 9.5 FL (ref 6–12)
POTASSIUM SERPL-SCNC: 3.8 MMOL/L (ref 3.5–5.2)
PROT UR QL STRIP: ABNORMAL
RBC # BLD AUTO: 3.04 10*6/MM3 (ref 3.77–5.28)
RBC # UR STRIP: ABNORMAL /HPF
REF LAB TEST METHOD: ABNORMAL
SODIUM SERPL-SCNC: 132 MMOL/L (ref 136–145)
SP GR UR STRIP: 1.02 (ref 1–1.03)
SQUAMOUS #/AREA URNS HPF: ABNORMAL /HPF
UROBILINOGEN UR QL STRIP: ABNORMAL
WBC # UR STRIP: ABNORMAL /HPF
WBC NRBC COR # BLD: 9.06 10*3/MM3 (ref 3.4–10.8)
YEAST URNS QL MICRO: ABNORMAL /HPF

## 2022-01-13 PROCEDURE — 97530 THERAPEUTIC ACTIVITIES: CPT

## 2022-01-13 PROCEDURE — 87086 URINE CULTURE/COLONY COUNT: CPT | Performed by: STUDENT IN AN ORGANIZED HEALTH CARE EDUCATION/TRAINING PROGRAM

## 2022-01-13 PROCEDURE — 84100 ASSAY OF PHOSPHORUS: CPT | Performed by: CHIROPRACTOR

## 2022-01-13 PROCEDURE — 25010000002 MORPHINE PER 10 MG: Performed by: STUDENT IN AN ORGANIZED HEALTH CARE EDUCATION/TRAINING PROGRAM

## 2022-01-13 PROCEDURE — 87040 BLOOD CULTURE FOR BACTERIA: CPT | Performed by: STUDENT IN AN ORGANIZED HEALTH CARE EDUCATION/TRAINING PROGRAM

## 2022-01-13 PROCEDURE — 87076 CULTURE ANAEROBE IDENT EACH: CPT | Performed by: STUDENT IN AN ORGANIZED HEALTH CARE EDUCATION/TRAINING PROGRAM

## 2022-01-13 PROCEDURE — 63710000001 INSULIN ASPART PER 5 UNITS: Performed by: STUDENT IN AN ORGANIZED HEALTH CARE EDUCATION/TRAINING PROGRAM

## 2022-01-13 PROCEDURE — 36415 COLL VENOUS BLD VENIPUNCTURE: CPT | Performed by: STUDENT IN AN ORGANIZED HEALTH CARE EDUCATION/TRAINING PROGRAM

## 2022-01-13 PROCEDURE — 94760 N-INVAS EAR/PLS OXIMETRY 1: CPT

## 2022-01-13 PROCEDURE — 94660 CPAP INITIATION&MGMT: CPT

## 2022-01-13 PROCEDURE — 82962 GLUCOSE BLOOD TEST: CPT

## 2022-01-13 PROCEDURE — 85027 COMPLETE CBC AUTOMATED: CPT | Performed by: STUDENT IN AN ORGANIZED HEALTH CARE EDUCATION/TRAINING PROGRAM

## 2022-01-13 PROCEDURE — 25010000002 HEPARIN (PORCINE) PER 1000 UNITS: Performed by: STUDENT IN AN ORGANIZED HEALTH CARE EDUCATION/TRAINING PROGRAM

## 2022-01-13 PROCEDURE — 81001 URINALYSIS AUTO W/SCOPE: CPT | Performed by: STUDENT IN AN ORGANIZED HEALTH CARE EDUCATION/TRAINING PROGRAM

## 2022-01-13 PROCEDURE — 99231 SBSQ HOSP IP/OBS SF/LOW 25: CPT | Performed by: STUDENT IN AN ORGANIZED HEALTH CARE EDUCATION/TRAINING PROGRAM

## 2022-01-13 PROCEDURE — 94799 UNLISTED PULMONARY SVC/PX: CPT

## 2022-01-13 PROCEDURE — 80048 BASIC METABOLIC PNL TOTAL CA: CPT | Performed by: STUDENT IN AN ORGANIZED HEALTH CARE EDUCATION/TRAINING PROGRAM

## 2022-01-13 PROCEDURE — 25010000002 CEFTRIAXONE PER 250 MG: Performed by: STUDENT IN AN ORGANIZED HEALTH CARE EDUCATION/TRAINING PROGRAM

## 2022-01-13 PROCEDURE — 63710000001 INSULIN DETEMIR PER 5 UNITS: Performed by: STUDENT IN AN ORGANIZED HEALTH CARE EDUCATION/TRAINING PROGRAM

## 2022-01-13 PROCEDURE — 70450 CT HEAD/BRAIN W/O DYE: CPT

## 2022-01-13 PROCEDURE — 72170 X-RAY EXAM OF PELVIS: CPT

## 2022-01-13 RX ORDER — PANTOPRAZOLE SODIUM 40 MG/1
40 TABLET, DELAYED RELEASE ORAL
Status: DISCONTINUED | OUTPATIENT
Start: 2022-01-14 | End: 2022-01-20 | Stop reason: HOSPADM

## 2022-01-13 RX ORDER — MIDODRINE HYDROCHLORIDE 5 MG/1
5 TABLET ORAL
Status: DISCONTINUED | OUTPATIENT
Start: 2022-01-14 | End: 2022-01-20 | Stop reason: HOSPADM

## 2022-01-13 RX ADMIN — MONTELUKAST 10 MG: 10 TABLET, FILM COATED ORAL at 20:15

## 2022-01-13 RX ADMIN — IPRATROPIUM BROMIDE AND ALBUTEROL SULFATE 3 ML: 2.5; .5 SOLUTION RESPIRATORY (INHALATION) at 15:51

## 2022-01-13 RX ADMIN — METOPROLOL TARTRATE 25 MG: 25 TABLET, FILM COATED ORAL at 20:15

## 2022-01-13 RX ADMIN — SODIUM CHLORIDE, PRESERVATIVE FREE 10 ML: 5 INJECTION INTRAVENOUS at 08:22

## 2022-01-13 RX ADMIN — OXYBUTYNIN CHLORIDE 5 MG: 5 TABLET, EXTENDED RELEASE ORAL at 08:21

## 2022-01-13 RX ADMIN — GABAPENTIN 300 MG: 300 CAPSULE ORAL at 05:26

## 2022-01-13 RX ADMIN — ISOSORBIDE MONONITRATE 30 MG: 30 TABLET, EXTENDED RELEASE ORAL at 08:21

## 2022-01-13 RX ADMIN — INSULIN DETEMIR 30 UNITS: 100 INJECTION, SOLUTION SUBCUTANEOUS at 21:10

## 2022-01-13 RX ADMIN — GABAPENTIN 300 MG: 300 CAPSULE ORAL at 13:30

## 2022-01-13 RX ADMIN — INSULIN ASPART 30 UNITS: 100 INJECTION, SOLUTION INTRAVENOUS; SUBCUTANEOUS at 08:23

## 2022-01-13 RX ADMIN — IPRATROPIUM BROMIDE AND ALBUTEROL SULFATE 3 ML: 2.5; .5 SOLUTION RESPIRATORY (INHALATION) at 07:44

## 2022-01-13 RX ADMIN — RANOLAZINE 500 MG: 500 TABLET, FILM COATED, EXTENDED RELEASE ORAL at 08:21

## 2022-01-13 RX ADMIN — ASPIRIN 81 MG: 81 TABLET, FILM COATED ORAL at 08:21

## 2022-01-13 RX ADMIN — METOPROLOL TARTRATE 50 MG: 50 TABLET, FILM COATED ORAL at 08:21

## 2022-01-13 RX ADMIN — BUMETANIDE 2 MG: 1 TABLET ORAL at 08:21

## 2022-01-13 RX ADMIN — CLOPIDOGREL 75 MG: 75 TABLET, FILM COATED ORAL at 08:21

## 2022-01-13 RX ADMIN — CEFTRIAXONE SODIUM 1 G: 1 INJECTION, POWDER, FOR SOLUTION INTRAMUSCULAR; INTRAVENOUS at 11:59

## 2022-01-13 RX ADMIN — MORPHINE SULFATE 1 MG: 2 INJECTION, SOLUTION INTRAMUSCULAR; INTRAVENOUS at 21:09

## 2022-01-13 RX ADMIN — HEPARIN SODIUM 5000 UNITS: 5000 INJECTION INTRAVENOUS; SUBCUTANEOUS at 05:20

## 2022-01-13 RX ADMIN — LISINOPRIL 5 MG: 5 TABLET ORAL at 08:21

## 2022-01-13 RX ADMIN — GABAPENTIN 300 MG: 300 CAPSULE ORAL at 21:09

## 2022-01-13 RX ADMIN — IPRATROPIUM BROMIDE AND ALBUTEROL SULFATE 3 ML: 2.5; .5 SOLUTION RESPIRATORY (INHALATION) at 21:00

## 2022-01-13 RX ADMIN — INSULIN ASPART 30 UNITS: 100 INJECTION, SOLUTION INTRAVENOUS; SUBCUTANEOUS at 12:01

## 2022-01-13 RX ADMIN — ATORVASTATIN CALCIUM 20 MG: 20 TABLET, FILM COATED ORAL at 08:21

## 2022-01-13 RX ADMIN — NYSTATIN: 100000 POWDER TOPICAL at 00:09

## 2022-01-13 RX ADMIN — SODIUM CHLORIDE, PRESERVATIVE FREE 10 ML: 5 INJECTION INTRAVENOUS at 20:16

## 2022-01-13 RX ADMIN — NYSTATIN: 100000 POWDER TOPICAL at 08:22

## 2022-01-13 RX ADMIN — LEVOTHYROXINE SODIUM 25 MCG: 25 TABLET ORAL at 08:21

## 2022-01-13 RX ADMIN — ACETAMINOPHEN 650 MG: 325 TABLET, FILM COATED ORAL at 20:25

## 2022-01-13 RX ADMIN — HEPARIN SODIUM 5000 UNITS: 5000 INJECTION INTRAVENOUS; SUBCUTANEOUS at 21:10

## 2022-01-13 RX ADMIN — PANTOPRAZOLE SODIUM 40 MG: 40 INJECTION, POWDER, FOR SOLUTION INTRAVENOUS at 05:19

## 2022-01-13 RX ADMIN — INSULIN ASPART 4 UNITS: 100 INJECTION, SOLUTION INTRAVENOUS; SUBCUTANEOUS at 12:00

## 2022-01-13 RX ADMIN — HEPARIN SODIUM 5000 UNITS: 5000 INJECTION INTRAVENOUS; SUBCUTANEOUS at 13:30

## 2022-01-13 RX ADMIN — RANOLAZINE 500 MG: 500 TABLET, FILM COATED, EXTENDED RELEASE ORAL at 20:15

## 2022-01-13 RX ADMIN — MORPHINE SULFATE 1 MG: 2 INJECTION, SOLUTION INTRAMUSCULAR; INTRAVENOUS at 16:41

## 2022-01-13 RX ADMIN — INSULIN ASPART 30 UNITS: 100 INJECTION, SOLUTION INTRAVENOUS; SUBCUTANEOUS at 18:04

## 2022-01-13 RX ADMIN — NYSTATIN: 100000 POWDER TOPICAL at 22:37

## 2022-01-13 RX ADMIN — INSULIN DETEMIR 30 UNITS: 100 INJECTION, SOLUTION SUBCUTANEOUS at 08:27

## 2022-01-13 NOTE — PROGRESS NOTES
HD#4-61 y.o. female with a CMH of ESRD on dialysis MWF (since 10/21/2021), CAD s/p CABG x3,  uncontrolled DM type 2, HFpEF, GERD, HTN, HLD, MIKE on CPAP who presents to the ED with AMS and noted HHS.    I was present with the resident during the entire history and physical exam. I discussed the case with the resident.  I have reviewed the notes, assessment and plan, and/or procedures performed by the resident. I concur with the resident’s documentation        Interval events:  Admitted with insulin gtt.  Off of insulin gtt since- 1/10.  BG better controlled since 1/11 with range  over the past 24 hours+.  Pt's confusion noted to be improving since admission but not at baseline per Nephro.  Pt with fall this am (discussed with staff who witness) - pt reportedly lowered herself to floor and landed on buttocks. Pt did not strike head- al lift needed to place pt back in bed. No acute/focal findings on physical exam on rounds- but noted ongoing confusion, reported hallucinations.  Head CT - pending.  MRI brain (? Acute/subacute CVA) not possible due to body habitus.  Pt seen by PT - will benefit from rehab facillit prior to DC home.  Pt acknowledges that she has found rehab helpful in the past.    Temp:  [97.5 °F (36.4 °C)-97.8 °F (36.6 °C)] 97.6 °F (36.4 °C)  Heart Rate:  [63-89] 71  Resp:  [16-20] 18  BP: (101-145)/(50-83) 111/56    GEN:  Alert, Oriented to person ( difficulties noted reciting her name), place, not time  HEENT:  NCAT  HEART:  S1, S2  LUNGS:  CTA bilaterally  ABD:  + BS, soft, NT, ND  EXT:  No focal ttp over bony prominces, no pitting edema    A/P: 61 y.o. female admitted with:  Active Hospital Problems    Diagnosis  POA   • **Type 2 diabetes mellitus with kidney complication, with long-term current use of insulin ([facility]) [E11.29, Z79.4]  Not Applicable   • Physical deconditioning [R53.81]  Yes   • Personal history of noncompliance with medical treatment, presenting hazards to health [Z91.19]  Not  Applicable   • Anemia due to chronic kidney disease, on chronic dialysis ([facility]) [N18.6, D63.1, Z99.2]  Not Applicable   • Unspecified diastolic (congestive) heart failure ([facility]) [I50.30]  Yes   • Acute cystitis with hematuria [N30.01]  No   • MIKE and COPD overlap syndrome ([facility]) [G47.33, J44.9]  Yes   • Hypothyroidism [E03.9]  Yes   • Fall [W19.XXXA]  Unknown   • Coronary artery disease [I25.10]  Yes   • COPD (chronic obstructive pulmonary disease) ([facility]) [J44.9]  Yes   • Hypertension [I10]  Yes      Resolved Hospital Problems    Diagnosis Date Resolved POA   • Diabetic acidosis ([facility]) [E11.10] 01/10/2022 Yes   • SIRS (systemic inflammatory response syndrome) ([facility]) [R65.10] 01/11/2022 Yes   • Altered mental status [R41.82] 01/11/2022 Yes   • CAD (coronary artery disease) [I25.10] 01/09/2022 Yes   • Type 2 diabetes mellitus with hyperosmolar hyperglycemic state (HHS) ([facility]) [E11.00, E11.65] 01/11/2022 Yes     PLAN:  Obtain Head CT- given reported fall.  Review medications- limit all psychotropic/sedating medications.  Fall precaution.  Frequent reorienting.  Need to ensure good sleep/wake cycle. Cont CTX (+ UTI)  Appreciate discussion with Nephro - noted adjusments to BB and addition of Midodrine on dialysis days.            Signature    This document has been electronically signed by Radha Argueta MD on January 13, 2022 14:53 CST

## 2022-01-13 NOTE — SIGNIFICANT NOTE
"Patient assisted herself to the floor earlier this am after her \"legs couldn't hold me.\"   Urinalysis shows acute cystitis with hematuria.  Forman cath out.  Will contact case management for rehab dispo.      This document has been electronically signed by Paulina Solano MD on January 13, 2022 10:50 CST      "

## 2022-01-13 NOTE — PLAN OF CARE
Goal Outcome Evaluation:  Plan of Care Reviewed With: patient        Progress: no change  Outcome Summary: Patient had a fall this morning. No injury at the time but she is now c/o right hip/buttock pain. Morphine given and MD notified. Catheter removed this morning. no output yet, but patient does not void much-diaylisis patient. Bladder scan showed 4ml. CM consulted to find a rehab facility or [facility] home for rehab. U/a sent- uti-one dose rocephin given. Order for patient to have midodrine before diaylsis tomorrow. CT of head today- stable. Patient has been experiencing VH of rats in the floor, a horse in her room and smoke in the air. MD aware. Blood culture ordered, in process. VSS. Will continue to monitor.

## 2022-01-13 NOTE — DISCHARGE PLACEMENT REQUEST
"Yamileth Slater (61 y.o. 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                 Active Insurance as of 1/9/2022     Primary Coverage     Payor Plan Insurance Group Employer/Plan Group    ANTHEM MEDICARE REPLACEMENT ANTHEM MEDICARE ADVANTAGE [id number]     Payor Plan Address Payor Plan Phone Number Payor Plan Fax Number Effective Dates    [address] [phone]  1/1/2022 - None Entered    [facility] [address]       Subscriber Name Subscriber Birth Date Member ID       YAMILETH SLATER 1960 [id number]           Secondary Coverage     Payor Plan Insurance Group Employer/Plan Group    [place]      Payor Plan Address Payor Plan Phone Number Payor Plan Fax Number Effective Dates    [address] [phone]  6/28/2019 - None Entered    [facility] [address]       Subscriber Name Subscriber Birth Date Member ID       YAMILETH SLATER 1960 [id number]                 Emergency Contacts      (Rel.) Home Phone Work Phone Mobile Phone    Yamileth Chavez (Daughter) [phone] -- [phone]    Suzanne Rivera " (Daughter) [phone] -- [phone]    Huy Slater (Spouse) [phone] -- [phone]            Emergency Contact Information     Name Relation Home Work Mobile    Yamileth Chavez Daughter [phone]    Suzanne Rivera Daughter [phone]    BryonHuy Spouse [phone]          Insurance Information                [facility] MEDICARE REPLACEMENT/[facility] MEDICARE ADVANTAGE Phone: [phone]    Subscriber: Yamileth Slater Subscriber#: [id number]    Group#: [id number] Precert#: --        [place] Phone: [phone]    Subscriber: Yamileth Slater Subscriber#: [id number]    Group#: -- Precert#: --

## 2022-01-13 NOTE — PLAN OF CARE
Goal Outcome Evaluation:              Outcome Summary: Patient rested throughout the night, patient tolerated cpap on at HS. No falls or skin breakdown at this time. VSS, bs continues q4hrs.

## 2022-01-13 NOTE — PLAN OF CARE
Patient resting comfortably at this time on BiPAP. Patient tolerates BiPAP but remains quiet in nature and only speaks using few words when off BiPAP.

## 2022-01-13 NOTE — PROGRESS NOTES
"[facility]  [address] [place] [address]. [address]  T - [phone]  F - [phone]     Progress Note          PATIENT  DEMOGRAPHICS   PATIENT NAME: Yamileth Slater                      PHYSICIAN: Ace Lauren MD  : 1960  MRN: [id number]   LOS: 3 days    Patient Care Team:  Rianna Macias MD as PCP - General (Family Medicine)  Subjective   SUBJECTIVE   Fell this am, had BM today         Objective   OBJECTIVE   Vital Signs  Temp:  [97.5 °F (36.4 °C)-97.8 °F (36.6 °C)] 97.6 °F (36.4 °C)  Heart Rate:  [63-89] 71  Resp:  [16-20] 18  BP: ()/(50-83) 111/56    Flowsheet Rows      First Filed Value   Admission Height 167.6 cm (66\") Documented at 2022 1304   Admission Weight 132 kg (291 lb 9.6 oz) Documented at 2022 1048           I/O last 3 completed shifts:  In: 480 [P.O.:480]  Out: 1150 [Urine:150; Other:1000]    PHYSICAL EXAM    Physical Exam  Constitutional:       Appearance: She is well-developed.   HENT:      Head: Normocephalic.   Eyes:      Pupils: Pupils are equal, round, and reactive to light.   Cardiovascular:      Rate and Rhythm: Normal rate and regular rhythm.      Heart sounds: Normal heart sounds.   Pulmonary:      Effort: Pulmonary effort is normal.      Breath sounds: Normal breath sounds.   Abdominal:      General: Bowel sounds are normal.      Palpations: Abdomen is soft.   Musculoskeletal:         General: No swelling.   Skin:     Coloration: Skin is not jaundiced.   Neurological:      Mental Status: She is alert. She is disoriented.         RESULTS   Results Review:    Results from last 7 days   Lab Units 22  0621 22  0432 22  0401 22  1618 22  1214   SODIUM mmol/L 132* 135* 140   < > 129*   POTASSIUM mmol/L 3.8 3.6 3.5   < > 4.0   CHLORIDE mmol/L 97* 99 100   < > 87*   CO2 mmol/L 22.0 19.0* 25.0   < > 18.0*   BUN mg/dL 43* 57* 43*   < > 44*   CREATININE mg/dL 5.91* 6.73* 4.43*   < > 3.46*   CALCIUM mg/dL " 8.3* 7.9* 8.4*   < > 9.0   BILIRUBIN mg/dL  --   --   --   --  0.2   ALK PHOS U/L  --   --   --   --  192*   ALT (SGPT) U/L  --   --   --   --  7   AST (SGOT) U/L  --   --   --   --  10   GLUCOSE mg/dL 129* 313* 193*   < > 800*    < > = values in this interval not displayed.       Estimated Creatinine Clearance: 13.6 mL/min (A) (by C-G formula based on SCr of 5.91 mg/dL (H)).    Results from last 7 days   Lab Units 01/13/22  0621 01/12/22  1950 01/12/22  1634 01/09/22  1618 01/09/22  1214   MAGNESIUM mg/dL  --   --   --   --  1.9   PHOSPHORUS mg/dL 6.0* 4.1 4.0   < > 6.1*    < > = values in this interval not displayed.             Results from last 7 days   Lab Units 01/13/22  0621 01/12/22  0432 01/11/22  0401 01/10/22  0419 01/09/22  1214   WBC 10*3/mm3 9.06 9.07 11.07* 11.34* 17.78*   HEMOGLOBIN g/dL 8.5* 8.6* 8.9* 8.5* 7.0*   PLATELETS 10*3/mm3 220 202 226 259 423       Results from last 7 days   Lab Units 01/09/22  1214   INR  1.10         Imaging Results (Last 24 Hours)     ** No results found for the last 24 hours. **           MEDICATIONS    aspirin, 81 mg, Oral, Daily  atorvastatin, 20 mg, Oral, Daily  bumetanide, 2 mg, Oral, Once per day on Sun Tue Thu Sat  cefTRIAXone, 1 g, Intravenous, Once  clopidogrel, 75 mg, Oral, Daily  gabapentin, 300 mg, Oral, Q8H  heparin (porcine), 5,000 Units, Subcutaneous, Q8H  insulin aspart, 0-24 Units, Subcutaneous, TID AC  insulin aspart, 30 Units, Subcutaneous, TID With Meals  insulin detemir, 30 Units, Subcutaneous, Q12H  ipratropium-albuterol, 3 mL, Nebulization, 4x Daily - RT  isosorbide mononitrate, 30 mg, Oral, QAM  levothyroxine, 25 mcg, Oral, Daily  lisinopril, 5 mg, Oral, Daily  metoprolol tartrate, 50 mg, Oral, Q12H  montelukast, 10 mg, Oral, Nightly  nystatin, , Topical, Q12H  oxybutynin XL, 5 mg, Oral, Daily  [START ON 1/14/2022] pantoprazole, 40 mg, Oral, Q AM  ranolazine, 500 mg, Oral, Q12H  senna-docusate sodium, 2 tablet, Oral, BID  sodium chloride, 10 mL,  Intravenous, Q12H      O2, 3 L/min, Last Rate: 3 L/min (01/09/22 7819)  sodium chloride, 10 mL/hr        Assessment/Plan   ASSESSMENT / PLAN      Type 2 diabetes mellitus with kidney complication, with long-term current use of insulin (HCC)    Hypertension    COPD (chronic obstructive pulmonary disease) (HCC)    Coronary artery disease    Hypothyroidism    MIKE and COPD overlap syndrome (HCC)    Acute cystitis with hematuria    Unspecified diastolic (congestive) heart failure (HCC)    Anemia due to chronic kidney disease, on chronic dialysis (HCC)    Personal history of noncompliance with medical treatment, presenting hazards to health    Physical deconditioning    1.  ESRD on HD- Initiated HD on 10/21 due to hyperkalemia and fluid overload, now  ESRD.  HD MWF for now.  Keep bumex 2mg on non dialysis days. Euvolemic at present. Cr much higher this admission pre dialysis than it used to be. bp low and difficult UF with hd and will add midodrine pre dialysis       2.  Hyponatremia- Na 129 in the setting of severe hyperglycemia, hyperosmolar hyponatremia. Now better with glucose correction     3.  HHS-started on insulin drip, managed per primary team. Off insulin drip. On aspart and levemir     4.  History of CAD     5.  History of COPD - On trilogy at night but did not use it last night     6.  Anemia / previous GI bleed / b12 deficiency-  s/p capsule endoscopy which showed few small non-bleeding intestinal AVMs and single small polyp. hgb stable     7.  HTN- Continue home antihypertensives  and may have orthostatic. Lower metoprolol to 25mg                This document has been electronically signed by Ace Lauren MD on January 13, 2022 11:43 CST

## 2022-01-13 NOTE — DISCHARGE PLACEMENT REQUEST
"Yamileth Slater (61 y.o. 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                 Active Insurance as of 1/9/2022     Primary Coverage     Payor Plan Insurance Group Employer/Plan Group    ANTHEM MEDICARE REPLACEMENT [employer]M MEDICARE ADVANTAGE [id number]     Payor Plan Address Payor Plan Phone Number Payor Plan Fax Number Effective Dates    [address] [phone]  1/1/2022 - None Entered    [facility] [address]       Subscriber Name Subscriber Birth Date Member ID       YAMILETH SLATER 1960 [id number]           Secondary Coverage     Payor Plan Insurance Group Employer/Plan Group    [place]      Payor Plan Address Payor Plan Phone Number Payor Plan Fax Number Effective Dates    [address] [phone]  6/28/2019 - None Entered    [facility] [address]       Subscriber Name Subscriber Birth Date Member ID       YAMILETH SLATER 1960 [id number]                 Emergency Contacts      (Rel.) Home Phone Work Phone Mobile Phone    Yamileth Chavez (Daughter) [phone] -- [phone]    Suzanne Rivera " (Daughter) [phone] -- [phone]    Huy Slater (Spouse) [phone] -- [phone]            Emergency Contact Information     Name Relation Home Work Mobile    Yamileth Chavez Daughter [phone]    Suzanne Rivera Daughter [phone]    BryonHuy Spouse [phone]          Insurance Information                [facility] MEDICARE REPLACEMENT/[facility] MEDICARE ADVANTAGE Phone: [phone]    Subscriber: Yamileth Slater Subscriber#: [id number]    Group#: [id number] Precert#: --        [place] Phone: [phone]    Subscriber: Yamileth Slater Subscriber#: [id number]    Group#: -- Precert#: --

## 2022-01-13 NOTE — PAYOR COMM NOTE
"Csasie Ojeda RN [facility]  [phone]    phone  [phone]    Fax  Cont Stay    Yamileth Slater (61 y.o. 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                 Active Insurance as of 1/9/2022     Primary Coverage     Payor Plan Insurance Group Employer/Plan Group    ANTHEM MEDICARE REPLACEMENT ANTHEM MEDICARE ADVANTAGE [id number]     Payor Plan Address Payor Plan Phone Number Payor Plan Fax Number Effective Dates    [address] [phone]  1/1/2022 - None Entered    [facility] [address]       Subscriber Name Subscriber Birth Date Member ID       YAMILETH SLATER 1960 [id number]           Secondary Coverage     Payor Plan Insurance Group Employer/Plan Group    [place]      Payor Plan Address Payor Plan Phone Number Payor Plan Fax Number Effective Dates    [address] [phone]  6/28/2019 - None Entered    [facility] [id number]       Subscriber Name Subscriber Birth Date Member ID       YAMILETH SLATER 1960 [id number]                 Emergency Contacts     Contact " Person (Rel.) Home Phone Work Phone Mobile Phone    Yamileth Chavez (Daughter) [phone] -- [phone]    Suzanne Rivera (Daughter) [phone] -- [phone]    Huy Slater (Spouse) [phone] -- [phone]            Vital Signs (last day)     Date/Time Temp Temp src Pulse Resp BP Patient Position SpO2    01/13/22 0744 -- -- 71 18 -- -- 98    01/13/22 0721 97.6 (36.4) Oral 67 18 111/56 Lying 98    01/13/22 0333 -- -- 63 16 -- -- 99    01/13/22 0300 97.8 (36.6) Axillary 67 16 144/68 Lying 100    01/13/22 0025 -- -- 74 -- -- -- --    01/12/22 2300 97.8 (36.6) Oral 89 18 135/65 Lying 99    01/12/22 2211 -- -- 88 17 -- -- 99    01/12/22 2113 -- -- 85 -- 145/63 -- --    01/12/22 2112 -- -- 85 -- 145/83 -- --    01/12/22 2008 -- -- 87 -- -- -- --    01/12/22 2002 -- -- 85 18 -- -- 98    01/12/22 1907 97.6 (36.4) Oral 82 20 119/54 Sitting 98    01/12/22 1539 -- -- 83 -- -- -- --    01/12/22 1537 97.5 (36.4) Temporal 86 20 101/50 Lying 98    01/12/22 1419 -- -- 87 -- 93/53 Lying --    01/12/22 1104 -- -- 88 -- -- -- --    01/12/22 0731 -- -- 84 20 -- -- 99    01/12/22 0714 97.1 (36.2) Oral 94 20 114/56 Lying 99    01/12/22 0400 -- -- 90 20 -- -- 99    01/12/22 0300 96.9 (36.1) Axillary 89 18 139/65 Lying 99    01/12/22 0125 -- -- 97 19 -- -- 97    01/12/22 0048 -- -- 98 -- -- -- --          Oxygen Therapy (last day)     Date/Time SpO2 Device (Oxygen Therapy) Flow (L/min) Oxygen Concentration (%) ETCO2 (mmHg)    01/13/22 0744 98 nasal cannula 2 -- --    01/13/22 0721 98 nasal cannula 2 -- --    01/13/22 0333 99 NPPV/NIV -- 30 --    01/13/22 0300 100 nasal cannula 2 -- --    01/13/22 0058 -- nasal cannula 2 -- --    01/12/22 2300 99 NPPV/NIV -- 30 --    01/12/22 2211 99 NPPV/NIV -- 30 --    01/12/22 2135 -- -- 2 -- --    01/12/22 2008 -- nasal cannula 2 -- --    01/12/22 2002 98 nasal cannula 2 -- --    01/12/22 1907 98 nasal cannula 2 -- --    01/12/22 1537 98 nasal cannula 2 -- --    01/12/22 0731 99 nasal cannula 2  -- --    01/12/22 0730 -- nasal cannula 2 -- --    01/12/22 0714 99 nasal cannula 2 -- --    01/12/22 0400 99 NPPV/NIV -- 30 --    01/12/22 0300 99 NPPV/NIV -- 30 --    01/12/22 0125 97 NPPV/NIV -- 30 --          Current Facility-Administered Medications   Medication Dose Route Frequency Provider Last Rate Last Admin   • acetaminophen (TYLENOL) tablet 650 mg  650 mg Oral Q4H PRN Paulina Solano MD        Or   • acetaminophen (TYLENOL) 160 MG/5ML solution 650 mg  650 mg Oral Q4H PRN Paulina Solano MD        Or   • acetaminophen (TYLENOL) suppository 650 mg  650 mg Rectal Q4H PRN Paulina Solano MD       • albuterol (PROVENTIL) nebulizer solution 0.083% 2.5 mg/3mL  2.5 mg Nebulization Q4H PRN Paulina Solano MD       • aspirin EC tablet 81 mg  81 mg Oral Daily Paulina Solano MD   81 mg at 01/13/22 0821   • atorvastatin (LIPITOR) tablet 20 mg  20 mg Oral Daily Paulina Solano MD   20 mg at 01/13/22 0821   • sennosides-docusate (PERICOLACE) 8.6-50 MG per tablet 2 tablet  2 tablet Oral BID Paulina Solano MD   2 tablet at 01/12/22 0808    And   • polyethylene glycol (MIRALAX) packet 17 g  17 g Oral Daily PRN Paulina Solano MD        And   • bisacodyl (DULCOLAX) EC tablet 5 mg  5 mg Oral Daily PRN Paulina Solano MD        And   • bisacodyl (DULCOLAX) suppository 10 mg  10 mg Rectal Daily PRN Paulina Solano MD       • bumetanide (BUMEX) tablet 2 mg  2 mg Oral Once per day on Sun Tue Thu Ace Ng MD   2 mg at 01/13/22 0821   • clopidogrel (PLAVIX) tablet 75 mg  75 mg Oral Daily Paulina Solano MD   75 mg at 01/13/22 0821   • dextrose (D50W) (25 g/50 mL) IV injection 12.5 g  12.5 g Intravenous PRN Paulina Solano MD       • dextrose (D50W) (25 g/50 mL) IV injection 25 g  25 g Intravenous Q15 Min PRN Paulina Solano MD       • dextrose (GLUTOSE) oral gel 15 g  15 g Oral Q15 Min PRN Paulina Solano MD       • gabapentin (NEURONTIN) capsule 300  mg  300 mg Oral Q8H Paulina Solano MD   300 mg at 01/13/22 0526   • glucagon (human recombinant) (GLUCAGEN DIAGNOSTIC) injection 1 mg  1 mg Subcutaneous Q15 Min PRN Paulina Solano MD       • heparin (porcine) 5000 UNIT/ML injection 5,000 Units  5,000 Units Subcutaneous Q8H Paulina Solano MD   5,000 Units at 01/13/22 0520   • heparin (porcine) injection 2,000 Units  2,000 Units Intracatheter PRN Paulina Solano MD   2,000 Units at 01/10/22 1136   • heparin (porcine) injection 2,000 Units  2,000 Units Intracatheter PRN Ace Lauren MD   3,600 Units at 01/12/22 1104   • hydrALAZINE (APRESOLINE) injection 10 mg  10 mg Intravenous Q6H PRN Paulina Solano MD   10 mg at 01/09/22 1628   • hydrocortisone-bacitracin-zinc oxide-nystatin (MAGIC BARRIER) ointment 1 application  1 application Topical PRN Paulina Solano MD       • insulin aspart (novoLOG) injection 0-24 Units  0-24 Units Subcutaneous TID AC Paulina Solano MD       • insulin aspart (novoLOG) injection 30 Units  30 Units Subcutaneous TID With Meals Paulina Solano MD   30 Units at 01/13/22 0823   • insulin detemir (LEVEMIR) injection 30 Units  30 Units Subcutaneous Q12H Paulina Solano MD   30 Units at 01/13/22 0827   • ipratropium-albuterol (DUO-NEB) nebulizer solution 3 mL  3 mL Nebulization 4x Daily - RT Paulina Solano MD   3 mL at 01/13/22 0744   • isosorbide mononitrate (IMDUR) 24 hr tablet 30 mg  30 mg Oral QAM Paulina Solano MD   30 mg at 01/13/22 0821   • labetalol (NORMODYNE,TRANDATE) injection 20 mg  20 mg Intravenous Q6H PRN Paulina Solano MD   20 mg at 01/09/22 1737   • levothyroxine (SYNTHROID, LEVOTHROID) tablet 25 mcg  25 mcg Oral Daily Paulina Solano MD   25 mcg at 01/13/22 0821   • lisinopril (PRINIVIL,ZESTRIL) tablet 5 mg  5 mg Oral Daily Paulina Solano MD   5 mg at 01/13/22 0821   • metoprolol tartrate (LOPRESSOR) tablet 50 mg  50 mg Oral Q12H Paulina Solano MD    50 mg at 22   • montelukast (SINGULAIR) tablet 10 mg  10 mg Oral Nightly Paulina Solano MD   10 mg at 22   • morphine injection 1 mg  1 mg Intravenous Q4H PRN Paulina Solano MD   1 mg at 22 06   • nystatin (MYCOSTATIN) powder   Topical Q12H Paulina Solano MD   Given at 22   • O2 (OXYGEN)  3 L/min Inhalation Continuous Paulina Solano ,000 mL/hr at 22 1809 3 L/min at 22 180   • ondansetron (ZOFRAN) injection 4 mg  4 mg Intravenous Q6H PRN Paulina Solano MD       • oxybutynin XL (DITROPAN-XL) 24 hr tablet 5 mg  5 mg Oral Daily Paulina Solano MD   5 mg at 22   • [START ON 2022] pantoprazole (PROTONIX) EC tablet 40 mg  40 mg Oral Q AM Luz Quijano MD       • ranolazine (RANEXA) 12 hr tablet 500 mg  500 mg Oral Q12H Paulina Solano MD   500 mg at 22   • sodium chloride 0.9 % flush 10 mL  10 mL Intravenous PRN Paulina Solano MD       • sodium chloride 0.9 % flush 10 mL  10 mL Intravenous Once PRN Paulina Solano MD       • sodium chloride 0.9 % flush 10 mL  10 mL Intravenous Q12H Paulina Solano MD   10 mL at 22   • sodium chloride 0.9 % flush 10 mL  10 mL Intravenous PRN Paulina Solano MD       • sodium chloride 0.9 % infusion  10 mL/hr Intravenous Continuous PRN Paulina Solano MD            Physician Progress Notes (last 24 hours)      Luz Quijano MD at 22 08              FAMILY MEDICINE RESIDENCY SERVICE  DAILY PROGRESS NOTE    NAME: Yamileth Slater  : 1960  MRN: [id number]      LOS: 3 days     PROVIDER OF SERVICE: Luz Quijano MD    Chief Complaint: Type 2 diabetes mellitus with kidney complication, with long-term current use of insulin (HCC)    Subjective:     Interval History:  History taken from: patient chart    Patient was seen awake resting comfortably this morning on exam. No acute overnight events. Levemir was schedule  30 AM 30PM but due to higher glucose readings in AM we will transition to levemir 60 at night and continue 30 novolog TID. Encouraged to work with PT/OT. Is able to walk to bathroom at home with support of walker. MWF dialysis patient, received dialysis on M and W during admission. Initially was AMS on admission. This morning patient was AOx4. Overall doing better. Denies SOA, chest pain, abdominal discomfort, n/v.     Review of Systems:   Review of Systems   Constitutional: Negative.    Respiratory: Negative.    Cardiovascular: Negative.    Gastrointestinal: Negative.    Endocrine: Negative.    Musculoskeletal: Negative for arthralgias and myalgias.   Skin: Negative.    Neurological: Negative.  Negative for headaches.   Psychiatric/Behavioral: Negative.  Negative for suicidal ideas.       Objective:     Vital Signs  Temp:  [97.5 °F (36.4 °C)-97.8 °F (36.6 °C)] 97.6 °F (36.4 °C)  Heart Rate:  [63-89] 71  Resp:  [16-20] 18  BP: ()/(50-83) 111/56  Flow (L/min):  [2] 2   Body mass index is 45.86 kg/m².    Physical Exam  Physical Exam  Vitals and nursing note reviewed.   Constitutional:       Appearance: Normal appearance. She is normal weight.   HENT:      Head: Normocephalic and atraumatic.      Nose: Nose normal.      Mouth/Throat:      Mouth: Mucous membranes are moist.   Cardiovascular:      Rate and Rhythm: Normal rate and regular rhythm.      Pulses: Normal pulses.      Heart sounds: Normal heart sounds.   Pulmonary:      Effort: Pulmonary effort is normal.      Breath sounds: Normal breath sounds.   Abdominal:      General: Abdomen is flat. Bowel sounds are normal.      Palpations: Abdomen is soft.   Musculoskeletal:         General: Normal range of motion.      Cervical back: Normal range of motion.   Skin:     General: Skin is warm and dry.      Capillary Refill: Capillary refill takes less than 2 seconds.   Neurological:      General: No focal deficit present.      Mental Status: She is alert and  oriented to person, place, and time. Mental status is at baseline.   Psychiatric:         Mood and Affect: Mood normal.         Behavior: Behavior normal.         Scheduled Meds   aspirin, 81 mg, Oral, Daily  atorvastatin, 20 mg, Oral, Daily  bumetanide, 2 mg, Oral, Once per day on Sun Tue Thu Sat  clopidogrel, 75 mg, Oral, Daily  gabapentin, 300 mg, Oral, Q8H  heparin (porcine), 5,000 Units, Subcutaneous, Q8H  insulin aspart, 0-24 Units, Subcutaneous, TID AC  insulin aspart, 30 Units, Subcutaneous, TID With Meals  insulin detemir, 30 Units, Subcutaneous, Q12H  ipratropium-albuterol, 3 mL, Nebulization, 4x Daily - RT  isosorbide mononitrate, 30 mg, Oral, QAM  levothyroxine, 25 mcg, Oral, Daily  lisinopril, 5 mg, Oral, Daily  metoprolol tartrate, 50 mg, Oral, Q12H  montelukast, 10 mg, Oral, Nightly  nystatin, , Topical, Q12H  oxybutynin XL, 5 mg, Oral, Daily  pantoprazole, 40 mg, Intravenous, Q AM  ranolazine, 500 mg, Oral, Q12H  senna-docusate sodium, 2 tablet, Oral, BID  sodium chloride, 10 mL, Intravenous, Q12H       PRN Meds   •  acetaminophen **OR** acetaminophen **OR** acetaminophen  •  albuterol  •  senna-docusate sodium **AND** polyethylene glycol **AND** bisacodyl **AND** bisacodyl  •  dextrose  •  dextrose  •  dextrose  •  glucagon (human recombinant)  •  heparin (porcine)  •  heparin (porcine)  •  hydrALAZINE  •  hydrocortisone-bacitracin-zinc oxide-nystatin  •  labetalol  •  Morphine  •  ondansetron  •  sodium chloride  •  sodium chloride  •  sodium chloride  •  sodium chloride      Diagnostic Data    Results from last 7 days   Lab Units 01/13/22  0621 01/09/22  1618 01/09/22  1214   WBC 10*3/mm3 9.06   < > 17.78*   HEMOGLOBIN g/dL 8.5*   < > 7.0*   HEMATOCRIT % 26.6*   < > 22.1*   PLATELETS 10*3/mm3 220   < > 423   GLUCOSE mg/dL 129*   < > 800*   CREATININE mg/dL 5.91*   < > 3.46*   BUN mg/dL 43*   < > 44*   SODIUM mmol/L 132*   < > 129*   POTASSIUM mmol/L 3.8   < > 4.0   AST (SGOT) U/L  --   --  10    ALT (SGPT) U/L  --   --  7   ALK PHOS U/L  --   --  192*   BILIRUBIN mg/dL  --   --  0.2   ANION GAP mmol/L 13.0   < > 24.0*    < > = values in this interval not displayed.       No radiology results for the last day      I reviewed the patient's new clinical results.    Assessment/Plan:     Active Hospital Problems    Diagnosis  POA   • **Type 2 diabetes mellitus with kidney complication, with long-term current use of insulin (HCC) [E11.29, Z79.4]  Not Applicable     - Levemir 60 units nightly - Novolog 30 with meals  - SSI  -HgA1c 10/2021 : 8.80       • Physical deconditioning [R53.81]  Unknown     - PT/OT      • Personal history of noncompliance with medical treatment, presenting hazards to health [Z91.19]  Not Applicable     · Difficulty showing up to clinic visits due to complexity of medical problems and transportation  · Would really benefit from a couple home visits from PCP to help improve compliance       • Anemia due to chronic kidney disease, on chronic dialysis (HCC) [N18.6, D63.1, Z99.2]  Not Applicable     - MWF Dialysis patient  - Epoetin (Retacrit) 10,000 units three times a week on dialysis days MWF  - Hg 7 on admission. Transfuse if Hg less than 7  - Type and screen done   - Daily CBC             • Unspecified diastolic (congestive) heart failure (HCC) [I50.30]  Yes     - Continue Imdur, metoprolol, lisinopril, bumex  - pro-BNP 77861  - Echo 1/10/2022 : EF 61-65%  -Cardiology on board (Dr. Handy) - appreciate recommendations  - MWF Dialysis patient, caution with HHS fluids      • MIKE and COPD overlap syndrome (HCC) [G47.33, J44.9]  Yes     · Home Trilogy/CPAP  · Home 3L oxygen dependent. Maintain strict Sats between 88-92%      • Hypothyroidism [E03.9]  Yes     Continue home Levothyroxine 25mcg, stable     • Coronary artery disease [I25.10]  Yes     · S/P CABG x 3 (Primary), Bilateral carotid artery stenosis w/ 2 stents on left side,  PAD (peripheral artery disease) with stent in right upper  leg  · Continue   - aspirin  - Plavix 75mg daily  - atorvastatin 20 mg daily  - lisinopril 5mg daily  -lopressor 50mg   -Imdur 30mg daily  - Ranexa - 500mg BID   · EKG - sinus tachycardia             • COPD (chronic obstructive pulmonary disease) (HCC) [J44.9]  Yes     - Dependent on 3L NC at Home  - Maintain strict sats between 88-92%  -Singulair 10mg   -DuoNebs  -NIPPV via Respiratory. Patient uses Trilogy at home/night      • Hypertension [I10]  Yes       · Metoprolol 50mg bid, hold if HR < 60bpm  · Lisinopril 5mg daily  · Telemetry  · Hydralazine 10mg prn q6h for SBP > 170  · Labetalol 20mg Q6h >160           DVT prophylaxis: Heparin  Code status is   Code Status and Medical Interventions:   Ordered at: 22 1508     Level Of Support Discussed With:    Next of Kin (If No Surrogate)     Code Status (Patient has no pulse and is not breathing):    CPR (Attempt to Resuscitate)     Medical Interventions (Patient has pulse or is breathing):    Full Support     Comments:    Discussed with  Huy Slater at bedside       Plan for disposition:Where: home and When:  1days      Time: 20 mins         This document has been electronically signed by Luz Quijano MD on 2022 08:45 CST        Electronically signed by Luz Quijano MD at 22 0845     Ace Lauren MD at 22 1633          [facility]  [address] [place] [address]. [address]  T - [phone]  F - [phone]     Progress Note          PATIENT  DEMOGRAPHICS   PATIENT NAME: Yamileth Slater                      PHYSICIAN: Ace Lauren MD  : 1960  MRN: [id number]   LOS: 2 days    Patient Care Team:  Rianna Macias MD as PCP - General (Family Medicine)  Subjective   SUBJECTIVE   Alert. Finished hd , remove close to 1 L today.         Objective   OBJECTIVE   Vital Signs  Temp:  [96.9 °F (36.1 °C)-97.6 °F (36.4 °C)] 97.5 °F (36.4 °C)  Heart Rate:  [] 83  Resp:  [16-20] 20  BP:  "()/(50-65) 101/50    Flowsheet Rows      First Filed Value   Admission Height 167.6 cm (66\") Documented at 01/09/2022 1304   Admission Weight 132 kg (291 lb 9.6 oz) Documented at 01/09/2022 1048           I/O last 3 completed shifts:  In: 470 [P.O.:370; IV Piggyback:100]  Out: 230 [Urine:230]    PHYSICAL EXAM    Physical Exam  Constitutional:       Appearance: She is well-developed.   HENT:      Head: Normocephalic.   Eyes:      Pupils: Pupils are equal, round, and reactive to light.   Cardiovascular:      Rate and Rhythm: Normal rate and regular rhythm.      Heart sounds: Normal heart sounds.   Pulmonary:      Effort: Pulmonary effort is normal.      Breath sounds: Normal breath sounds.   Abdominal:      General: Bowel sounds are normal.      Palpations: Abdomen is soft.   Musculoskeletal:         General: No swelling.   Skin:     Coloration: Skin is not jaundiced.   Neurological:      Mental Status: She is alert. She is disoriented.         RESULTS   Results Review:    Results from last 7 days   Lab Units 01/12/22  0432 01/11/22  0401 01/10/22  1214 01/09/22  1618 01/09/22  1214   SODIUM mmol/L 135* 140 132*   < > 129*   POTASSIUM mmol/L 3.6 3.5 3.2*   < > 4.0   CHLORIDE mmol/L 99 100 97*   < > 87*   CO2 mmol/L 19.0* 25.0 20.0*   < > 18.0*   BUN mg/dL 57* 43* 32*   < > 44*   CREATININE mg/dL 6.73* 4.43* 2.91*   < > 3.46*   CALCIUM mg/dL 7.9* 8.4* 8.1*   < > 9.0   BILIRUBIN mg/dL  --   --   --   --  0.2   ALK PHOS U/L  --   --   --   --  192*   ALT (SGPT) U/L  --   --   --   --  7   AST (SGOT) U/L  --   --   --   --  10   GLUCOSE mg/dL 313* 193* 255*   < > 800*    < > = values in this interval not displayed.       Estimated Creatinine Clearance: 11.9 mL/min (A) (by C-G formula based on SCr of 6.73 mg/dL (H)).    Results from last 7 days   Lab Units 01/12/22  1214 01/12/22  0815 01/12/22  0432 01/09/22  1618 01/09/22  1214   MAGNESIUM mg/dL  --   --   --   --  1.9   PHOSPHORUS mg/dL 3.7 7.0* 6.9*   < > 6.1*    " < > = values in this interval not displayed.             Results from last 7 days   Lab Units 01/12/22  0432 01/11/22  0401 01/10/22  0419 01/09/22  1214   WBC 10*3/mm3 9.07 11.07* 11.34* 17.78*   HEMOGLOBIN g/dL 8.6* 8.9* 8.5* 7.0*   PLATELETS 10*3/mm3 202 226 259 423       Results from last 7 days   Lab Units 01/09/22  1214   INR  1.10         Imaging Results (Last 24 Hours)     ** No results found for the last 24 hours. **           MEDICATIONS    aspirin, 81 mg, Oral, Daily  atorvastatin, 20 mg, Oral, Daily  [START ON 1/13/2022] bumetanide, 2 mg, Oral, Once per day on Sun Tue Thu Sat  clopidogrel, 75 mg, Oral, Daily  gabapentin, 300 mg, Oral, Q8H  heparin (porcine), 5,000 Units, Subcutaneous, Q8H  insulin aspart, 0-24 Units, Subcutaneous, TID AC  insulin aspart, 30 Units, Subcutaneous, TID With Meals  insulin detemir, 30 Units, Subcutaneous, Q12H  ipratropium-albuterol, 3 mL, Nebulization, 4x Daily - RT  isosorbide mononitrate, 30 mg, Oral, QAM  levothyroxine, 25 mcg, Oral, Daily  lisinopril, 5 mg, Oral, Daily  metoprolol tartrate, 50 mg, Oral, Q12H  montelukast, 10 mg, Oral, Nightly  nystatin, , Topical, Q12H  oxybutynin XL, 5 mg, Oral, Daily  pantoprazole, 40 mg, Intravenous, Q AM  ranolazine, 500 mg, Oral, Q12H  senna-docusate sodium, 2 tablet, Oral, BID  sodium chloride, 10 mL, Intravenous, Q12H      O2, 3 L/min, Last Rate: 3 L/min (01/09/22 1809)  sodium chloride, 10 mL/hr        Assessment/Plan   ASSESSMENT / PLAN      Type 2 diabetes mellitus with kidney complication, with long-term current use of insulin (HCC)    Hypertension    COPD (chronic obstructive pulmonary disease) (HCC)    Coronary artery disease    Hypothyroidism    MIKE and COPD overlap syndrome ([facility])    Unspecified diastolic (congestive) heart failure (HCC)    Anemia due to chronic kidney disease, on chronic dialysis (HCC)    Personal history of noncompliance with medical treatment, presenting hazards to health    1.  ESRD on  HD- Initiated HD on 10/21 due to hyperkalemia and fluid overload, now  ESRD.  HD MWF for now.  Keep bumex 2mg on non dialysis days. Euvolemic at present. Cr much higher than Monday lab result.      2.  Hyponatremia- Na 129 in the setting of severe hyperglycemia, hyperosmolar hyponatremia. Now better with glucose correction     3.  HHS-started on insulin drip, managed per primary team. Off insulin drip. On aspart and levemir     4.  History of CAD     5.  History of COPD - On trilogy at night but did not use it last night     6.  Anemia / previous GI bleed / b12 deficiency-  s/p capsule endoscopy which showed few small non-bleeding intestinal AVMs and single small polyp. Will likely require transfusion soon as her Hgb is 7 and will likely dilute further.     7.  HTN- Continue home antihypertensives                  This document has been electronically signed by Ace Lauren MD on January 12, 2022 16:33 CST           Electronically signed by Ace Lauren MD at 01/12/22 1633       Medical Student Notes (last 24 hours)  Notes from 01/12/22 0927 through 01/13/22 0927   No notes of this type exist for this encounter.         Consult Notes (last 24 hours)  Notes from 01/12/22 0927 through 01/13/22 0927   No notes of this type exist for this encounter.

## 2022-01-13 NOTE — SIGNIFICANT NOTE
Given patient's fall this morning and confusion, ABG, CT/MRI of head were ordered. MRI was unable to be done due to body habitus. CT of head did not reveal any acute intracranial pathology. Patient refused ABG this am. Attempted to call patient's , Huy Slater, twice to explain to him the need for an ABG to rule out pathologies that could be causing patient's confusion and to get a better understanding of patient's baseline. Was not able to get a hold of him on both attempts. Patient does have acute cystitis which could likely be causing confusion. Repeat Blood cultures are pending.         This document has been electronically signed by Luz Quijano MD on January 13, 2022 16:32 CST

## 2022-01-13 NOTE — SIGNIFICANT NOTE
Patient fell in floor. CNA was close by, she said that patient had stood up and became weak and fell to the floor on to her bottom. No injuries noted. VSS. MD notified. Patient was assisted back to bed using mechanical lift.

## 2022-01-13 NOTE — THERAPY TREATMENT NOTE
Acute Care - Physical Therapy Treatment Note  [facility]     Patient Name: Yamileth Slater  : 1960  MRN: [id number]  Today's Date: 2022      Visit Dx:     ICD-10-CM ICD-9-CM   1. Diabetic ketoacidosis with coma associated with other specified diabetes mellitus ([facility])  E13.11 250.32   2. Hypertension, unspecified type  I10 401.9   3. Altered mental status, unspecified altered mental status type  R41.82 780.97   4. Dysphagia, unspecified type  R13.10 787.20   5. Impaired functional mobility, balance, gait, and endurance  Z74.09 V49.89   6. Impaired mobility and ADLs  Z74.09 V49.89    Z78.9      Patient Active Problem List   Diagnosis   • Chronic obstructive pulmonary disease (HCC)   • Chronic midline low back pain without sciatica   • Vitamin D deficiency   • Tobacco dependence syndrome   • Surgical follow-up care   • Shoulder joint pain   • Peripheral vascular disease ([facility])   • Pain   • Neurologic disorder associated with diabetes mellitus ([facility])   • Morbid obesity with BMI of 50.0-59.9, adult ([facility])   • Mixed hyperlipidemia   • Kidney stone   • History of colon polyps   • GERD (gastroesophageal reflux disease)   • Excoriated eczema   • Hypertension   • Encounter for medication refill   • Dyslipidemia   • Diabetes mellitus ([facility])   • COPD (chronic obstructive pulmonary disease) ([facility])   • Chronic folliculitis   • Coronary artery disease   • Mild persistent asthma   • 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   • Fall   • Hypothyroidism   • Carotid artery disease ([facility])   • Current smoker   • [place] abuse   • PAD (peripheral artery disease) ([facility])   • Intercostal pain   • Venous insufficiency   • LAFB (left anterior fascicular block)   • Pulmonary hypertension ([facility])   • Left hip pain   • Neck pain   • CKD (chronic kidney disease), symptom management only, stage 5 ([facility])   • MIKE and COPD overlap syndrome ([facility])   • Pneumonia due to COVID-19 virus   • Hyperkalemia   • Acute cystitis with  hematuria   • Cutaneous candidiasis   • Community acquired pneumonia of right middle lobe of lung   • MRSA infection   • Alkaline phosphatase elevation   • COVID-19 ruled out by laboratory testing   • Esophageal dysphagia   • Monilial esophagitis ([facility])   • NSTEMI, initial episode of care ([facility])   • Pneumonia due to infectious organism   • Cellulitis of left leg   • Unspecified diastolic (congestive) heart failure ([facility])   • Hyponatremia   • Urinary tract infection due to Proteus   • History of insertion of tunneled central venous catheter (CVC) with port   • Anemia due to chronic kidney disease, on chronic dialysis ([facility])   • Pneumonitis   • Personal history of noncompliance with medical treatment, presenting hazards to health   • Type 2 diabetes mellitus with kidney complication, with long-term current use of insulin ([facility])   • Physical deconditioning     Past Medical History:   Diagnosis Date   • Acute blood loss anemia 4/16/2017    Likely due to gastric oozing at this time. - Dr. Duarte (GI) was consulted and has now signed off, will follow up outpatient - pill colonoscopy showed AVMs - continue to monitor   • Altered mental status 1/9/2022    - AMS on presentation - initial ABG pH 7.3, CO2 34 - Procal 0.29 - UA negative for acute cysitits -CTA head wnl  - Empiric Zosyn and Vancomycin -Lactate 2.5 on admission  - blood cultures no growth at 24 hours     • Anxiety    • CAD (coronary artery disease) 4/24/2021    S/P 3 stents 5/1/2021 for [facility] Continue ASA 81mg & Clopidogrel 75mg Continue Atorvastatin 40mg   • Carotid artery stenosis    • Chronic obstructive lung disease ([facility])    • CKD (chronic kidney disease) stage 4, GFR 15-29 ml/min ([facility])    • Colonic polyp    • Coronary arteriosclerosis    • Diabetes mellitus ([facility])    • Diabetic neuropathy ([facility])    • Ear pain, right 10/18/2021    - canal trauma due to patient scratching and DMT2 - added cortisporin ear drops   • Elevated troponin 10/12/2021    -most likely from CKD  -Trending down -Neg chest pain   • GERD (gastroesophageal reflux disease)    • GI bleed 5/13/2021    - GI will follow up outpatient - Protonix 40mg daily - Avoid medical DVT prophy and use mechanical at this time instead. - Continue to monitor - pill colonoscopy results showed AVMs   • History of transfusion    • Hypercholesterolemia    • Hypertension    • Hypomagnesemia 6/27/2021    Monitor and replace   • Morbid obesity ([facility])    • Nephrolithiasis    • Peripheral vascular disease ([facility])    • SIRS (systemic inflammatory response syndrome) ([facility]) 1/9/2022    Admission  - WBC 17.78   -   - RR 16 - 1/10: VSS/wnl - CXR - Mild pulm edema - Blood cultures no growth at 24 hours  - Procalcitonin 0.29 - UA : glucose 1000, negative Leucocytes/nitrate - Empiric Zosyn and Vancomcyin    • Sleep apnea    • Substance abuse ([facility])    • Vitamin D deficiency      Past Surgical History:   Procedure Laterality Date   • CARDIAC CATHETERIZATION N/A 7/14/2020   • CARDIAC CATHETERIZATION N/A 4/23/2021    Procedure: Left Heart Cath;  Surgeon: Melba Romo MD;  Location: [facility] CATH INVASIVE LOCATION;  Service: Cardiology;  Laterality: N/A;   • CARDIAC CATHETERIZATION N/A 4/30/2021    Procedure: Percutaneous Coronary Intervention;  Surgeon: Russell Voss MD;  Location: [facility] CATH INVASIVE LOCATION;  Service: Cardiovascular;  Laterality: N/A;   • CARDIAC CATHETERIZATION N/A 4/30/2021    Procedure: Stent NIKKI coronary;  Surgeon: Russell Voss MD;  Location: [facility] CATH INVASIVE LOCATION;  Service: Cardiovascular;  Laterality: N/A;   • CARDIAC CATHETERIZATION Left 11/13/2021    Procedure: Left Heart Cath;  Surgeon: Niall Rios MD;  Location: [facility] CATH INVASIVE LOCATION;  Service: Cardiology;  Laterality: Left;   • CAROTID STENT Left    • COLONOSCOPY     • COLONOSCOPY N/A 5/14/2021    Procedure: COLONOSCOPY;  Surgeon: Mingo Duarte MD;  Location: [facility] ENDOSCOPY;  Service: Gastroenterology;  Laterality:  N/A;   • CORONARY ARTERY BYPASS GRAFT N/A 2013    CABG X 3   • CYSTOSCOPY BLADDER STONE LITHOTRIPSY Bilateral    • ENDOSCOPY N/A 4/12/2021    Procedure: ESOPHAGOGASTRODUODENOSCOPY;  Surgeon: Mingo Duarte MD;  Location: [facility] ENDOSCOPY;  Service: Gastroenterology;  Laterality: N/A;   • ENDOSCOPY N/A 5/14/2021    Procedure: ESOPHAGOGASTRODUODENOSCOPY;  Surgeon: Mingo Duarte MD;  Location: [facility] ENDOSCOPY;  Service: Gastroenterology;  Laterality: N/A;   • INTERVENTIONAL RADIOLOGY PROCEDURE N/A 10/21/2021    Procedure: tunneled central venous catheter placement;  Surgeon: Donnie Robles MD;  Location: [facility] ANGIO INVASIVE LOCATION;  Service: Interventional Radiology;  Laterality: N/A;     PT Assessment (last 12 hours)     PT Evaluation and Treatment     Row Name 01/13/22 1045          Physical Therapy Time and Intention    Subjective Information no complaints  -RW     Document Type therapy note (daily note)  -RW     Mode of Treatment individual therapy; physical therapy  -RW     Patient Effort good  -     Row Name 01/13/22 1045          General Information    Patient Profile Reviewed yes  -RW     Existing Precautions/Restrictions fall  -     Row Name 01/13/22 1045          Cognition    Orientation Status (Cognition) unable/difficult to assess  -     Row Name 01/13/22 1045          Pain Scale: Numbers Pre/Post-Treatment    Pretreatment Pain Rating 0/10 - no pain  -RW     Posttreatment Pain Rating 0/10 - no pain  -     Row Name 01/13/22 1045          Pain Scale: FACES Pre/Post-Treatment    Pain: FACES Scale, Pretreatment 0-->no hurt  -RW     Posttreatment Pain Rating 0-->no hurt  -RW     Row Name 01/13/22 1045          Range of Motion Comprehensive    General Range of Motion no range of motion deficits identified  -     Row Name 01/13/22 1045          Bed Mobility    Bed Mobility --  -RW     Scooting/Bridging [place] (Bed Mobility) --  -RW     Supine-Sit [place] (Bed Mobility)  standby assist  -RW     Sit-Supine [place] (Bed Mobility) standby assist  -RW     Assistive Device (Bed Mobility) bed rails; head of bed elevated; draw sheet  -     Row Name 01/13/22 1045          Transfers    Sit-Stand [place] (Transfers) minimum assist (75% patient effort)  -     Row Name 01/13/22 1045          Sit-Stand Transfer    Assistive Device (Sit-Stand Transfers) walker, front-wheeled  -     Row Name 01/13/22 1045          Gait/Stairs (Locomotion)    [place] Level (Gait) contact guard  -RW     Assistive Device (Gait) walker, front-wheeled  -RW     Distance in Feet (Gait) 2-3 steps forward and back  -RW     Pattern (Gait) step-to  -RW     Deviations/Abnormal Patterns (Gait) base of support, wide; nasir decreased; gait speed decreased; stride length decreased; antalgic  -RW     Bilateral Gait Deviations forward flexed posture  -     Row Name 01/13/22 1045          Safety Issues, Functional Mobility    Impairments Affecting Function (Mobility) balance; coordination; endurance/activity tolerance; strength; shortness of breath  -     Row Name 01/13/22 1045          Balance    Comment, Balance reaching out of base of support with good bal  -     Row Name 01/13/22 1045          Motor Skills    Therapeutic Exercise hip; knee; ankle  -     Row Name 01/13/22 1045          Hip (Therapeutic Exercise)    Hip (Therapeutic Exercise) strengthening exercise  -RW     Hip Strengthening (Therapeutic Exercise) marching while seated; 10 repetitions  -     Row Name 01/13/22 1045          Knee (Therapeutic Exercise)    Knee (Therapeutic Exercise) strengthening exercise  -RW     Knee Strengthening (Therapeutic Exercise) LAQ (long arc quad); 10 repetitions  -     Row Name 01/13/22 1045          Ankle (Therapeutic Exercise)    Ankle (Therapeutic Exercise) AROM (active range of motion)  -     Ankle AROM (Therapeutic Exercise) dorsiflexion; plantarflexion; 10 repetitions; 5 repetitions  -     Row  Name 01/13/22 1045          Vital Signs    Pre Systolic BP Rehab 147  -RW     Pre Treatment Diastolic BP 63  -RW     Pretreatment Heart Rate (beats/min) 67  -RW     Pre SpO2 (%) 99  -RW     O2 Delivery Pre Treatment nasal cannula  -RW     Row Name 01/13/22 1045          Bed Mobility Goal 1 (PT)    Activity/Assistive Device (Bed Mobility Goal 1, PT) sit to supine; supine to sit  -RW     [place] Level/Cues Needed (Bed Mobility Goal 1, PT) modified independence  -RW     Time Frame (Bed Mobility Goal 1, PT) by discharge  -RW     Progress/Outcomes (Bed Mobility Goal 1, PT) goal not met  -RW     Row Name 01/13/22 1045          Transfer Goal 1 (PT)    Activity/Assistive Device (Transfer Goal 1, PT) sit-to-stand/stand-to-sit; bed-to-chair/chair-to-bed  -RW     [place] Level/Cues Needed (Transfer Goal 1, PT) standby assist  -RW     Time Frame (Transfer Goal 1, PT) by discharge  -RW     Progress/Outcome (Transfer Goal 1, PT) goal not met  -RW     Row Name 01/13/22 1045          Gait Training Goal 1 (PT)    Activity/Assistive Device (Gait Training Goal 1, PT) gait (walking locomotion); assistive device use  -RW     [place] Level (Gait Training Goal 1, PT) standby assist  -RW     Distance (Gait Training Goal 1, PT) 50'x2  -RW     Time Frame (Gait Training Goal 1, PT) by discharge  -RW     Progress/Outcome (Gait Training Goal 1, PT) goal not met  -RW     Row Name 01/13/22 1045          Positioning and Restraints    Pre-Treatment Position in bed  -RW     Post Treatment Position bed  -RW     Row Name 01/13/22 1045          Therapy Assessment/Plan (PT)    Rehab Potential (PT) good, to achieve stated therapy goals  -RW     Criteria for Skilled Interventions Met (PT) yes; meets criteria; skilled treatment is necessary  -RW           User Key  (r) = Recorded By, (t) = Taken By, (c) = Cosigned By    Initials Name Provider Type    RW Tavon Adams, PTA Physical Therapy Assistant                Physical Therapy Education                  Title: PT OT SLP Therapies (In Progress)     Topic: Physical Therapy (In Progress)     Point: Mobility training (In Progress)     Learning Progress Summary           Patient Acceptance, E,TB, NR by LR at 1/12/2022 1416    Comment: Educated on PT POC and goals.                   Point: Home exercise program (In Progress)     Learning Progress Summary           Patient Acceptance, E,TB, NR by LR at 1/12/2022 1416    Comment: Educated on PT POC and goals.                   Point: Body mechanics (In Progress)     Learning Progress Summary           Patient Acceptance, E,TB, NR by LR at 1/12/2022 1416    Comment: Educated on PT POC and goals.                   Point: Precautions (In Progress)     Learning Progress Summary           Patient Acceptance, E,TB, NR by LR at 1/12/2022 1416    Comment: Educated on PT POC and goals.                               User Key     Initials Effective Dates Name Provider Type Discipline    LR 06/16/21 -  Lucien Gilman Physical Therapist PT              PT Recommendation and Plan  Anticipated Discharge Disposition (PT): skilled nursing facility, home with 24/7 care  Therapy Frequency (PT): other (see comments) (5-7d/week)  Plan of Care Reviewed With: patient  Progress: improving  Outcome Summary: pt transfers to eob with sba using bedrails and hob elevated, seated therex eob and some reaching activity for core strengthening and bal assessment. sit to stand x 5 and then stands to amb with fww and after 2-3 steps feels like her leg is going to buckle so stepped back min assist and sat on bed . sit to sup with sba. would benifit from rehab to home stay at [place].       Time Calculation:    PT Charges     Row Name 01/13/22 1420             Time Calculation    Start Time 1045  -RW      Stop Time 1127  -RW      Time Calculation (min) 42 min  -RW              Time Calculation- PT    Total Timed Code Minutes- PT 42 minute(s)  -RW              Timed Charges    56706 - PT Therapeutic  Activity Minutes 42  -RW              Total Minutes    Timed Charges Total Minutes 42  -RW       Total Minutes 42  -RW            User Key  (r) = Recorded By, (t) = Taken By, (c) = Cosigned By    Initials Name Provider Type    Tavon Avila PTA Physical Therapy Assistant              Therapy Charges for Today     Code Description Service Date Service Provider Modifiers Qty    [id number] HC PT THERAPEUTIC ACT EA 15 MIN 1/13/2022 Tavon Adams PTA GP 3          PT G-Codes  Outcome Measure Options: AM-PAC 6 Clicks Daily Activity (OT)  AM-PAC 6 Clicks Score (PT): 18  AM-PAC 6 Clicks Score (OT): 11    Tavon Adams PTA  1/13/2022

## 2022-01-13 NOTE — SIGNIFICANT NOTE
01/13/22 0930   OT Time and Intention   Session Not Performed patient unavailable for treatment  (Attempted OT tx this date. Pt on the floor with nursing staff upon arrival d/t fall. Staff reports getting al to assist pt off the ground as unable to assist manually. Will check back later as time allows and pt appropriate.)     Vidya Rodriguez, OTR/L

## 2022-01-13 NOTE — PROGRESS NOTES
FAMILY MEDICINE RESIDENCY SERVICE  DAILY PROGRESS NOTE    NAME: Yamileth Slater  : 1960  MRN: [id number]      LOS: 3 days     PROVIDER OF SERVICE: Luz Quijano MD    Chief Complaint: Type 2 diabetes mellitus with kidney complication, with long-term current use of insulin (HCC)    Subjective:     Interval History:  History taken from: patient chart    Patient was seen awake resting comfortably this morning on exam. No acute overnight events. Levemir 30 AM 30PM continue 30 novolog TID + SSI. Encouraged to work with PT/OT. Is able to walk to bathroom at home with support of walker. Previously was at a nursing home for PT/OT and states she was happy with it. MWF dialysis patient, received dialysis on M and W during admission. Initially was AMS on admission. This morning patient was AOx3. Overall doing better. Denies SOA, chest pain, abdominal discomfort, n/v.     - Around 9:45 this morning, patient tried to get out of her bed and patient assisted herself to the floor after she felt her legs gave out on her. CNA was close by. No injuries were noted. Patient had good ROM of her left leg on repeat examination. States her bottom hurts. Will order CT/MRI head.     Review of Systems:   Review of Systems   Constitutional: Negative.    Respiratory: Negative.    Cardiovascular: Negative.    Gastrointestinal: Negative.    Endocrine: Negative.    Musculoskeletal: Negative for arthralgias and myalgias.   Skin: Negative.    Neurological: Negative.  Negative for headaches.   Psychiatric/Behavioral: Negative.  Negative for suicidal ideas.       Objective:     Vital Signs  Temp:  [97.5 °F (36.4 °C)-97.8 °F (36.6 °C)] 97.6 °F (36.4 °C)  Heart Rate:  [63-89] 71  Resp:  [16-20] 18  BP: (101-145)/(50-83) 111/56  Flow (L/min):  [2] 2   Body mass index is 45.86 kg/m².    Physical Exam  Physical Exam  Vitals and nursing note reviewed.   Constitutional:       Appearance: Normal appearance. She is normal weight.   HENT:       Head: Normocephalic and atraumatic.      Nose: Nose normal.      Mouth/Throat:      Mouth: Mucous membranes are moist.   Cardiovascular:      Rate and Rhythm: Normal rate and regular rhythm.      Pulses: Normal pulses.      Heart sounds: Normal heart sounds.   Pulmonary:      Effort: Pulmonary effort is normal.      Breath sounds: Normal breath sounds.   Abdominal:      General: Abdomen is flat. Bowel sounds are normal.      Palpations: Abdomen is soft.   Musculoskeletal:         General: Normal range of motion.      Cervical back: Normal range of motion.   Skin:     General: Skin is warm and dry.      Capillary Refill: Capillary refill takes less than 2 seconds.   Neurological:      General: No focal deficit present.      Mental Status: She is alert and oriented to person, place, and time. Mental status is at baseline.   Psychiatric:         Mood and Affect: Mood normal.         Behavior: Behavior normal.         Scheduled Meds   aspirin, 81 mg, Oral, Daily  atorvastatin, 20 mg, Oral, Daily  bumetanide, 2 mg, Oral, Once per day on Sun Tue Thu Sat  clopidogrel, 75 mg, Oral, Daily  gabapentin, 300 mg, Oral, Q8H  heparin (porcine), 5,000 Units, Subcutaneous, Q8H  insulin aspart, 0-24 Units, Subcutaneous, TID AC  insulin aspart, 30 Units, Subcutaneous, TID With Meals  insulin detemir, 30 Units, Subcutaneous, Q12H  ipratropium-albuterol, 3 mL, Nebulization, 4x Daily - RT  isosorbide mononitrate, 30 mg, Oral, QAM  levothyroxine, 25 mcg, Oral, Daily  lisinopril, 5 mg, Oral, Daily  metoprolol tartrate, 25 mg, Oral, Q12H  [START ON 1/14/2022] midodrine, 5 mg, Oral, Once per day on Mon Wed Fri  montelukast, 10 mg, Oral, Nightly  nystatin, , Topical, Q12H  oxybutynin XL, 5 mg, Oral, Daily  [START ON 1/14/2022] pantoprazole, 40 mg, Oral, Q AM  ranolazine, 500 mg, Oral, Q12H  senna-docusate sodium, 2 tablet, Oral, BID  sodium chloride, 10 mL, Intravenous, Q12H       PRN Meds   •  acetaminophen **OR** acetaminophen **OR**  acetaminophen  •  albuterol  •  senna-docusate sodium **AND** polyethylene glycol **AND** bisacodyl **AND** bisacodyl  •  dextrose  •  dextrose  •  dextrose  •  glucagon (human recombinant)  •  heparin (porcine)  •  heparin (porcine)  •  hydrALAZINE  •  hydrocortisone-bacitracin-zinc oxide-nystatin  •  labetalol  •  Morphine  •  ondansetron  •  sodium chloride  •  sodium chloride  •  sodium chloride  •  sodium chloride      Diagnostic Data    Results from last 7 days   Lab Units 01/13/22  0621 01/09/22  1618 01/09/22  1214   WBC 10*3/mm3 9.06   < > 17.78*   HEMOGLOBIN g/dL 8.5*   < > 7.0*   HEMATOCRIT % 26.6*   < > 22.1*   PLATELETS 10*3/mm3 220   < > 423   GLUCOSE mg/dL 129*   < > 800*   CREATININE mg/dL 5.91*   < > 3.46*   BUN mg/dL 43*   < > 44*   SODIUM mmol/L 132*   < > 129*   POTASSIUM mmol/L 3.8   < > 4.0   AST (SGOT) U/L  --   --  10   ALT (SGPT) U/L  --   --  7   ALK PHOS U/L  --   --  192*   BILIRUBIN mg/dL  --   --  0.2   ANION GAP mmol/L 13.0   < > 24.0*    < > = values in this interval not displayed.       CT Head Without Contrast    Result Date: 1/13/2022  1. Stable appearance of the brain without radiographic evidence of acute intracranial pathology. Electronically signed by:  Hilda Orellana MD  1/13/2022 1:53 PM [facility] Workstation: [id number]        I reviewed the patient's new clinical results.    Assessment/Plan:     Active Hospital Problems    Diagnosis  POA   • **Type 2 diabetes mellitus with kidney complication, with long-term current use of insulin (HCC) [E11.29, Z79.4]  Not Applicable     - Levemir 60 units nightly - Novolog 30 with meals  - SSI  -HgA1c 10/2021 : 8.80       • Physical deconditioning [R53.81]  Yes     - PT/OT      • Personal history of noncompliance with medical treatment, presenting hazards to health [Z91.19]  Not Applicable     · Difficulty showing up to clinic visits due to complexity of medical problems and transportation  · Would really benefit from a couple home visits from PCP  to help improve compliance       • Anemia due to chronic kidney disease, on chronic dialysis ([facility]) [N18.6, D63.1, Z99.2]  Not Applicable     - MWF Dialysis patient  - Epoetin (Retacrit) 10,000 units three times a week on dialysis days MWF  - Hg 7 on admission. Transfuse if Hg less than 7  - Type and screen done   - Daily CBC             • Unspecified diastolic (congestive) heart failure (HCC) [I50.30]  Yes     - Continue Imdur, metoprolol, lisinopril, bumex  - pro-BNP 41884  - Echo 1/10/2022 : EF 61-65%  -Cardiology on board (Dr. Handy) - appreciate recommendations  - MWF Dialysis patient, caution with HHS fluids      • Acute cystitis with hematuria [N30.01]  No     -IV Rocephin 1 gm q 24- transition to omnicef   -Urine culture pending     • MIKE and COPD overlap syndrome ([facility]) [G47.33, J44.9]  Yes     · Home Trilogy/CPAP  · Home 3L oxygen dependent. Maintain strict Sats between 88-92%      • Hypothyroidism [E03.9]  Yes     Continue home Levothyroxine 25mcg, stable     • Fall [W19.XXXA]  Unknown     - No LOC  - Patient states her legs gave out  - CT of head - No acute intracranial pathology  - MRI unable to be done due to body habitus          • Coronary artery disease [I25.10]  Yes     · S/P CABG x 3 (Primary), Bilateral carotid artery stenosis w/ 2 stents on left side,  PAD (peripheral artery disease) with stent in right upper leg  · Continue   - aspirin  - Plavix 75mg daily  - atorvastatin 20 mg daily  - lisinopril 5mg daily  -lopressor 50mg   -Imdur 30mg daily  - Ranexa - 500mg BID   · EKG - sinus tachycardia             • COPD (chronic obstructive pulmonary disease) ([facility]) [J44.9]  Yes     - Dependent on 3L NC at Home  - Maintain strict sats between 88-92%  -Singulair 10mg   -DuoNebs  -NIPPV via Respiratory. Patient uses Trilogy at home/night      • Hypertension [I10]  Yes       · Metoprolol 50mg bid, hold if HR < 60bpm  · Lisinopril 5mg daily  · Telemetry  · Hydralazine 10mg prn q6h for SBP > 170  · Labetalol  20mg Q6h >160           DVT prophylaxis: Heparin  Code status is   Code Status and Medical Interventions:   Ordered at: 01/09/22 1508     Level Of Support Discussed With:    Next of Kin (If No Surrogate)     Code Status (Patient has no pulse and is not breathing):    CPR (Attempt to Resuscitate)     Medical Interventions (Patient has pulse or is breathing):    Full Support     Comments:    Discussed with  Huy Slater at bedside       Plan for disposition:Where: home and When:  1days      Time: 20 mins         This document has been electronically signed by Luz Quijano MD on January 13, 2022 14:22 CST

## 2022-01-14 LAB
ANION GAP SERPL CALCULATED.3IONS-SCNC: 18 MMOL/L (ref 5–15)
BACTERIA SPEC AEROBE CULT: NO GROWTH
BACTERIA SPEC AEROBE CULT: NORMAL
BACTERIA SPEC AEROBE CULT: NORMAL
BUN SERPL-MCNC: 49 MG/DL (ref 8–23)
BUN/CREAT SERPL: 6.3 (ref 7–25)
CALCIUM SPEC-SCNC: 7.9 MG/DL (ref 8.6–10.5)
CHLORIDE SERPL-SCNC: 91 MMOL/L (ref 98–107)
CO2 SERPL-SCNC: 17 MMOL/L (ref 22–29)
CREAT SERPL-MCNC: 7.79 MG/DL (ref 0.57–1)
DEPRECATED RDW RBC AUTO: 47.5 FL (ref 37–54)
ERYTHROCYTE [DISTWIDTH] IN BLOOD BY AUTOMATED COUNT: 14.7 % (ref 12.3–15.4)
GFR SERPL CREATININE-BSD FRML MDRD: 5 ML/MIN/1.73
GFR SERPL CREATININE-BSD FRML MDRD: ABNORMAL ML/MIN/{1.73_M2}
GLUCOSE BLDC GLUCOMTR-MCNC: 161 MG/DL (ref 70–130)
GLUCOSE BLDC GLUCOMTR-MCNC: 166 MG/DL (ref 70–130)
GLUCOSE BLDC GLUCOMTR-MCNC: 171 MG/DL (ref 70–130)
GLUCOSE BLDC GLUCOMTR-MCNC: 195 MG/DL (ref 70–130)
GLUCOSE BLDC GLUCOMTR-MCNC: 245 MG/DL (ref 70–130)
GLUCOSE SERPL-MCNC: 232 MG/DL (ref 65–99)
HCT VFR BLD AUTO: 24.7 % (ref 34–46.6)
HGB BLD-MCNC: 8 G/DL (ref 12–15.9)
MCH RBC QN AUTO: 28.3 PG (ref 26.6–33)
MCHC RBC AUTO-ENTMCNC: 32.4 G/DL (ref 31.5–35.7)
MCV RBC AUTO: 87.3 FL (ref 79–97)
PLATELET # BLD AUTO: 204 10*3/MM3 (ref 140–450)
PMV BLD AUTO: 9.1 FL (ref 6–12)
POTASSIUM SERPL-SCNC: 3.8 MMOL/L (ref 3.5–5.2)
RBC # BLD AUTO: 2.83 10*6/MM3 (ref 3.77–5.28)
SODIUM SERPL-SCNC: 126 MMOL/L (ref 136–145)
WBC NRBC COR # BLD: 7.66 10*3/MM3 (ref 3.4–10.8)

## 2022-01-14 PROCEDURE — 85027 COMPLETE CBC AUTOMATED: CPT | Performed by: STUDENT IN AN ORGANIZED HEALTH CARE EDUCATION/TRAINING PROGRAM

## 2022-01-14 PROCEDURE — 25010000002 ALTEPLASE 2 MG RECONSTITUTED SOLUTION: Performed by: INTERNAL MEDICINE

## 2022-01-14 PROCEDURE — 25010000002 HEPARIN (PORCINE) PER 1000 UNITS: Performed by: INTERNAL MEDICINE

## 2022-01-14 PROCEDURE — 80048 BASIC METABOLIC PNL TOTAL CA: CPT | Performed by: STUDENT IN AN ORGANIZED HEALTH CARE EDUCATION/TRAINING PROGRAM

## 2022-01-14 PROCEDURE — 99231 SBSQ HOSP IP/OBS SF/LOW 25: CPT | Performed by: STUDENT IN AN ORGANIZED HEALTH CARE EDUCATION/TRAINING PROGRAM

## 2022-01-14 PROCEDURE — 94760 N-INVAS EAR/PLS OXIMETRY 1: CPT

## 2022-01-14 PROCEDURE — 63710000001 INSULIN ASPART PER 5 UNITS: Performed by: STUDENT IN AN ORGANIZED HEALTH CARE EDUCATION/TRAINING PROGRAM

## 2022-01-14 PROCEDURE — 94799 UNLISTED PULMONARY SVC/PX: CPT

## 2022-01-14 PROCEDURE — 63710000001 INSULIN DETEMIR PER 5 UNITS: Performed by: STUDENT IN AN ORGANIZED HEALTH CARE EDUCATION/TRAINING PROGRAM

## 2022-01-14 PROCEDURE — 82962 GLUCOSE BLOOD TEST: CPT

## 2022-01-14 PROCEDURE — 97530 THERAPEUTIC ACTIVITIES: CPT

## 2022-01-14 PROCEDURE — 25010000002 HEPARIN (PORCINE) PER 1000 UNITS: Performed by: STUDENT IN AN ORGANIZED HEALTH CARE EDUCATION/TRAINING PROGRAM

## 2022-01-14 RX ORDER — CEFDINIR 300 MG/1
300 CAPSULE ORAL
Status: DISCONTINUED | OUTPATIENT
Start: 2022-01-14 | End: 2022-01-14

## 2022-01-14 RX ORDER — ALBUMIN (HUMAN) 12.5 G/50ML
12.5 SOLUTION INTRAVENOUS AS NEEDED
Status: ACTIVE | OUTPATIENT
Start: 2022-01-14 | End: 2022-01-14

## 2022-01-14 RX ORDER — HEPARIN SODIUM 1000 [USP'U]/ML
2000 INJECTION, SOLUTION INTRAVENOUS; SUBCUTANEOUS AS NEEDED
Status: DISCONTINUED | OUTPATIENT
Start: 2022-01-14 | End: 2022-01-20

## 2022-01-14 RX ORDER — LIDOCAINE 50 MG/G
1 PATCH TOPICAL
Status: DISCONTINUED | OUTPATIENT
Start: 2022-01-14 | End: 2022-01-20 | Stop reason: HOSPADM

## 2022-01-14 RX ADMIN — IPRATROPIUM BROMIDE AND ALBUTEROL SULFATE 3 ML: 2.5; .5 SOLUTION RESPIRATORY (INHALATION) at 20:00

## 2022-01-14 RX ADMIN — RANOLAZINE 500 MG: 500 TABLET, FILM COATED, EXTENDED RELEASE ORAL at 12:39

## 2022-01-14 RX ADMIN — CEFDINIR 300 MG: 300 CAPSULE ORAL at 14:11

## 2022-01-14 RX ADMIN — LIDOCAINE 1 PATCH: 50 PATCH CUTANEOUS at 17:56

## 2022-01-14 RX ADMIN — GABAPENTIN 300 MG: 300 CAPSULE ORAL at 22:09

## 2022-01-14 RX ADMIN — INSULIN ASPART 30 UNITS: 100 INJECTION, SOLUTION INTRAVENOUS; SUBCUTANEOUS at 10:46

## 2022-01-14 RX ADMIN — MIDODRINE HYDROCHLORIDE 5 MG: 5 TABLET ORAL at 08:44

## 2022-01-14 RX ADMIN — INSULIN DETEMIR 30 UNITS: 100 INJECTION, SOLUTION SUBCUTANEOUS at 22:00

## 2022-01-14 RX ADMIN — HEPARIN SODIUM 2000 UNITS: 1000 INJECTION INTRAVENOUS; SUBCUTANEOUS at 08:45

## 2022-01-14 RX ADMIN — NYSTATIN: 100000 POWDER TOPICAL at 20:20

## 2022-01-14 RX ADMIN — HEPARIN SODIUM 5000 UNITS: 5000 INJECTION INTRAVENOUS; SUBCUTANEOUS at 05:15

## 2022-01-14 RX ADMIN — GABAPENTIN 300 MG: 300 CAPSULE ORAL at 14:10

## 2022-01-14 RX ADMIN — INSULIN ASPART 8 UNITS: 100 INJECTION, SOLUTION INTRAVENOUS; SUBCUTANEOUS at 17:57

## 2022-01-14 RX ADMIN — ATORVASTATIN CALCIUM 20 MG: 20 TABLET, FILM COATED ORAL at 12:39

## 2022-01-14 RX ADMIN — HEPARIN SODIUM 5000 UNITS: 5000 INJECTION INTRAVENOUS; SUBCUTANEOUS at 22:10

## 2022-01-14 RX ADMIN — ASPIRIN 81 MG: 81 TABLET, FILM COATED ORAL at 12:39

## 2022-01-14 RX ADMIN — NYSTATIN: 100000 POWDER TOPICAL at 12:39

## 2022-01-14 RX ADMIN — LEVOTHYROXINE SODIUM 25 MCG: 25 TABLET ORAL at 12:35

## 2022-01-14 RX ADMIN — INSULIN ASPART 30 UNITS: 100 INJECTION, SOLUTION INTRAVENOUS; SUBCUTANEOUS at 17:56

## 2022-01-14 RX ADMIN — ISOSORBIDE MONONITRATE 30 MG: 30 TABLET, EXTENDED RELEASE ORAL at 06:32

## 2022-01-14 RX ADMIN — OXYBUTYNIN CHLORIDE 5 MG: 5 TABLET, EXTENDED RELEASE ORAL at 12:35

## 2022-01-14 RX ADMIN — INSULIN ASPART 4 UNITS: 100 INJECTION, SOLUTION INTRAVENOUS; SUBCUTANEOUS at 12:47

## 2022-01-14 RX ADMIN — MONTELUKAST 10 MG: 10 TABLET, FILM COATED ORAL at 20:19

## 2022-01-14 RX ADMIN — RANOLAZINE 500 MG: 500 TABLET, FILM COATED, EXTENDED RELEASE ORAL at 20:19

## 2022-01-14 RX ADMIN — CLOPIDOGREL 75 MG: 75 TABLET, FILM COATED ORAL at 12:35

## 2022-01-14 RX ADMIN — IPRATROPIUM BROMIDE AND ALBUTEROL SULFATE 3 ML: 2.5; .5 SOLUTION RESPIRATORY (INHALATION) at 08:48

## 2022-01-14 RX ADMIN — SODIUM CHLORIDE, PRESERVATIVE FREE 10 ML: 5 INJECTION INTRAVENOUS at 12:48

## 2022-01-14 RX ADMIN — INSULIN DETEMIR 30 UNITS: 100 INJECTION, SOLUTION SUBCUTANEOUS at 10:45

## 2022-01-14 RX ADMIN — LISINOPRIL 5 MG: 5 TABLET ORAL at 12:34

## 2022-01-14 RX ADMIN — IPRATROPIUM BROMIDE AND ALBUTEROL SULFATE 3 ML: 2.5; .5 SOLUTION RESPIRATORY (INHALATION) at 12:00

## 2022-01-14 RX ADMIN — HEPARIN SODIUM 5000 UNITS: 5000 INJECTION INTRAVENOUS; SUBCUTANEOUS at 14:10

## 2022-01-14 RX ADMIN — GABAPENTIN 300 MG: 300 CAPSULE ORAL at 05:16

## 2022-01-14 RX ADMIN — PANTOPRAZOLE SODIUM 40 MG: 40 TABLET, DELAYED RELEASE ORAL at 05:15

## 2022-01-14 RX ADMIN — IPRATROPIUM BROMIDE AND ALBUTEROL SULFATE 3 ML: 2.5; .5 SOLUTION RESPIRATORY (INHALATION) at 15:38

## 2022-01-14 RX ADMIN — ALTEPLASE: 2.2 INJECTION, POWDER, LYOPHILIZED, FOR SOLUTION INTRAVENOUS at 12:27

## 2022-01-14 RX ADMIN — INSULIN ASPART 8 UNITS: 100 INJECTION, SOLUTION INTRAVENOUS; SUBCUTANEOUS at 07:30

## 2022-01-14 RX ADMIN — SODIUM CHLORIDE, PRESERVATIVE FREE 10 ML: 5 INJECTION INTRAVENOUS at 20:19

## 2022-01-14 RX ADMIN — METOPROLOL TARTRATE 25 MG: 25 TABLET, FILM COATED ORAL at 20:19

## 2022-01-14 NOTE — THERAPY TREATMENT NOTE
Acute Care - Physical Therapy Treatment Note  [facility]     Patient Name: Yamileth Slater  : 1960  MRN: [id number]  Today's Date: 2022      Visit Dx:     ICD-10-CM ICD-9-CM   1. Diabetic ketoacidosis with coma associated with other specified diabetes mellitus ([facility])  E13.11 250.32   2. Hypertension, unspecified type  I10 401.9   3. Altered mental status, unspecified altered mental status type  R41.82 780.97   4. Dysphagia, unspecified type  R13.10 787.20   5. Impaired functional mobility, balance, gait, and endurance  Z74.09 V49.89   6. Impaired mobility and ADLs  Z74.09 V49.89    Z78.9      Patient Active Problem List   Diagnosis   • Chronic obstructive pulmonary disease (HCC)   • Chronic midline low back pain without sciatica   • Vitamin D deficiency   • Tobacco dependence syndrome   • Surgical follow-up care   • Shoulder joint pain   • Peripheral vascular disease ([facility])   • Pain   • Neurologic disorder associated with diabetes mellitus ([facility])   • Morbid obesity with BMI of 50.0-59.9, adult ([facility])   • Mixed hyperlipidemia   • Kidney stone   • History of colon polyps   • GERD (gastroesophageal reflux disease)   • Excoriated eczema   • Hypertension   • Encounter for medication refill   • Dyslipidemia   • Diabetes mellitus ([facility])   • COPD (chronic obstructive pulmonary disease) ([facility])   • Chronic folliculitis   • Coronary artery disease   • Mild persistent asthma   • 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   • Fall   • Hypothyroidism   • Carotid artery disease ([facility])   • Current smoker   • [place] abuse   • PAD (peripheral artery disease) ([facility])   • Intercostal pain   • Venous insufficiency   • LAFB (left anterior fascicular block)   • Pulmonary hypertension ([facility])   • Left hip pain   • Neck pain   • CKD (chronic kidney disease), symptom management only, stage 5 ([facility])   • MIKE and COPD overlap syndrome ([facility])   • Pneumonia due to COVID-19 virus   • Hyperkalemia   • Acute cystitis with  hematuria   • Cutaneous candidiasis   • Community acquired pneumonia of right middle lobe of lung   • MRSA infection   • Alkaline phosphatase elevation   • COVID-19 ruled out by laboratory testing   • Esophageal dysphagia   • Monilial esophagitis ([facility])   • NSTEMI, initial episode of care ([facility])   • Pneumonia due to infectious organism   • Cellulitis of left leg   • Unspecified diastolic (congestive) heart failure ([facility])   • Hyponatremia   • Urinary tract infection due to Proteus   • History of insertion of tunneled central venous catheter (CVC) with port   • Anemia due to chronic kidney disease, on chronic dialysis ([facility])   • Pneumonitis   • Personal history of noncompliance with medical treatment, presenting hazards to health   • Type 2 diabetes mellitus with kidney complication, with long-term current use of insulin ([facility])   • Physical deconditioning     Past Medical History:   Diagnosis Date   • Acute blood loss anemia 4/16/2017    Likely due to gastric oozing at this time. - Dr. Duarte (GI) was consulted and has now signed off, will follow up outpatient - pill colonoscopy showed AVMs - continue to monitor   • Altered mental status 1/9/2022    - AMS on presentation - initial ABG pH 7.3, CO2 34 - Procal 0.29 - UA negative for acute cysitits -CTA head wnl  - Empiric Zosyn and Vancomycin -Lactate 2.5 on admission  - blood cultures no growth at 24 hours     • Anxiety    • CAD (coronary artery disease) 4/24/2021    S/P 3 stents 5/1/2021 for [facility] Continue ASA 81mg & Clopidogrel 75mg Continue Atorvastatin 40mg   • Carotid artery stenosis    • Chronic obstructive lung disease ([facility])    • CKD (chronic kidney disease) stage 4, GFR 15-29 ml/min ([facility])    • Colonic polyp    • Coronary arteriosclerosis    • Diabetes mellitus ([facility])    • Diabetic neuropathy ([facility])    • Ear pain, right 10/18/2021    - canal trauma due to patient scratching and DMT2 - added cortisporin ear drops   • Elevated troponin 10/12/2021    -most likely from CKD  -Trending down -Neg chest pain   • GERD (gastroesophageal reflux disease)    • GI bleed 5/13/2021    - GI will follow up outpatient - Protonix 40mg daily - Avoid medical DVT prophy and use mechanical at this time instead. - Continue to monitor - pill colonoscopy results showed AVMs   • History of transfusion    • Hypercholesterolemia    • Hypertension    • Hypomagnesemia 6/27/2021    Monitor and replace   • Morbid obesity ([facility])    • Nephrolithiasis    • Peripheral vascular disease ([facility])    • SIRS (systemic inflammatory response syndrome) ([facility]) 1/9/2022    Admission  - WBC 17.78   -   - RR 16 - 1/10: VSS/wnl - CXR - Mild pulm edema - Blood cultures no growth at 24 hours  - Procalcitonin 0.29 - UA : glucose 1000, negative Leucocytes/nitrate - Empiric Zosyn and Vancomcyin    • Sleep apnea    • Substance abuse ([facility])    • Vitamin D deficiency      Past Surgical History:   Procedure Laterality Date   • CARDIAC CATHETERIZATION N/A 7/14/2020   • CARDIAC CATHETERIZATION N/A 4/23/2021    Procedure: Left Heart Cath;  Surgeon: Melba Romo MD;  Location: [facility] CATH INVASIVE LOCATION;  Service: Cardiology;  Laterality: N/A;   • CARDIAC CATHETERIZATION N/A 4/30/2021    Procedure: Percutaneous Coronary Intervention;  Surgeon: Russell Voss MD;  Location: [facility] CATH INVASIVE LOCATION;  Service: Cardiovascular;  Laterality: N/A;   • CARDIAC CATHETERIZATION N/A 4/30/2021    Procedure: Stent NIKKI coronary;  Surgeon: Russell Voss MD;  Location: [facility] CATH INVASIVE LOCATION;  Service: Cardiovascular;  Laterality: N/A;   • CARDIAC CATHETERIZATION Left 11/13/2021    Procedure: Left Heart Cath;  Surgeon: Niall Rios MD;  Location: [facility] CATH INVASIVE LOCATION;  Service: Cardiology;  Laterality: Left;   • CAROTID STENT Left    • COLONOSCOPY     • COLONOSCOPY N/A 5/14/2021    Procedure: COLONOSCOPY;  Surgeon: Mingo Duarte MD;  Location: [facility] ENDOSCOPY;  Service: Gastroenterology;  Laterality:  N/A;   • CORONARY ARTERY BYPASS GRAFT N/A 2013    CABG X 3   • CYSTOSCOPY BLADDER STONE LITHOTRIPSY Bilateral    • ENDOSCOPY N/A 4/12/2021    Procedure: ESOPHAGOGASTRODUODENOSCOPY;  Surgeon: Mingo Duarte MD;  Location: [facility] ENDOSCOPY;  Service: Gastroenterology;  Laterality: N/A;   • ENDOSCOPY N/A 5/14/2021    Procedure: ESOPHAGOGASTRODUODENOSCOPY;  Surgeon: Migno Duarte MD;  Location: [facility] ENDOSCOPY;  Service: Gastroenterology;  Laterality: N/A;   • INTERVENTIONAL RADIOLOGY PROCEDURE N/A 10/21/2021    Procedure: tunneled central venous catheter placement;  Surgeon: Donnie Robles MD;  Location: [facility] ANGIO INVASIVE LOCATION;  Service: Interventional Radiology;  Laterality: N/A;     PT Assessment (last 12 hours)     PT Evaluation and Treatment     Row Name 01/14/22 1408          Physical Therapy Time and Intention    Subjective Information no complaints  -RW     Document Type therapy note (daily note)  -RW     Mode of Treatment individual therapy; physical therapy  -RW     Patient Effort good  -RW     Comment spouse in room  -RW     Row Name 01/14/22 1408          General Information    Patient Profile Reviewed yes  -RW     Existing Precautions/Restrictions fall  -RW     Row Name 01/14/22 1408          Cognition    Orientation Status (Cognition) oriented x 3  -RW     Row Name 01/14/22 1408          Pain Scale: Numbers Pre/Post-Treatment    Pretreatment Pain Rating 0/10 - no pain  -RW     Posttreatment Pain Rating 0/10 - no pain  -RW     Row Name 01/14/22 1408          Pain Scale: FACES Pre/Post-Treatment    Pain: FACES Scale, Pretreatment 0-->no hurt  -RW     Posttreatment Pain Rating 0-->no hurt  -RW     Row Name 01/14/22 1408          Bed Mobility    Supine-Sit [place] (Bed Mobility) standby assist  -RW     Assistive Device (Bed Mobility) bed rails; head of bed elevated; draw sheet  -RW     Row Name 01/14/22 1408          Transfers    Sit-Stand [place] (Transfers) contact guard   -RW     Row Name 01/14/22 1408          Sit-Stand Transfer    Assistive Device (Sit-Stand Transfers) walker, front-wheeled  -RW     Row Name 01/14/22 1408          Gait/Stairs (Locomotion)    [place] Level (Gait) contact guard; 1 person assist; 1 person to manage equipment; 2 person assist  -RW     Assistive Device (Gait) walker, front-wheeled  -RW     Distance in Feet (Gait) 36,42,40,30  -RW     Pattern (Gait) step-through  -RW     Deviations/Abnormal Patterns (Gait) base of support, wide; nasir decreased; gait speed decreased; stride length decreased; antalgic  -RW     Bilateral Gait Deviations forward flexed posture  -RW     Comment (Gait/Stairs) follow with wc  -RW     Row Name 01/14/22 1408          Safety Issues, Functional Mobility    Impairments Affecting Function (Mobility) balance; coordination; endurance/activity tolerance; strength; shortness of breath  -RW     Row Name 01/14/22 1408          Knee (Therapeutic Exercise)    Knee Strengthening (Therapeutic Exercise) LAQ (long arc quad); 10 repetitions; 5 repetitions  -RW     Row Name 01/14/22 1408          Vital Signs    Pre Systolic BP Rehab 127  -RW     Pre Treatment Diastolic BP 53  -RW     Pretreatment Heart Rate (beats/min) 71  -RW     Posttreatment Heart Rate (beats/min) 85  -RW     Pre SpO2 (%) 99  -RW     O2 Delivery Pre Treatment supplemental O2  -RW     Post SpO2 (%) 99  -RW     O2 Delivery Post Treatment supplemental O2  -RW     Row Name 01/14/22 1408          Bed Mobility Goal 1 (PT)    Activity/Assistive Device (Bed Mobility Goal 1, PT) sit to supine; supine to sit  -RW     [place] Level/Cues Needed (Bed Mobility Goal 1, PT) modified independence  -RW     Time Frame (Bed Mobility Goal 1, PT) by discharge  -RW     Progress/Outcomes (Bed Mobility Goal 1, PT) goal not met  -RW     Row Name 01/14/22 1408          Transfer Goal 1 (PT)    Activity/Assistive Device (Transfer Goal 1, PT) sit-to-stand/stand-to-sit; bed-to-chair/chair-to-bed   -RW     [place] Level/Cues Needed (Transfer Goal 1, PT) standby assist  -RW     Time Frame (Transfer Goal 1, PT) by discharge  -RW     Progress/Outcome (Transfer Goal 1, PT) goal not met  -RW     Row Name 01/14/22 1408          Gait Training Goal 1 (PT)    Activity/Assistive Device (Gait Training Goal 1, PT) gait (walking locomotion); assistive device use  -RW     [place] Level (Gait Training Goal 1, PT) standby assist  -RW     Distance (Gait Training Goal 1, PT) 50'x2  -RW     Time Frame (Gait Training Goal 1, PT) by discharge  -RW     Progress/Outcome (Gait Training Goal 1, PT) goal partially met  -RW     Row Name 01/14/22 1408          Positioning and Restraints    Pre-Treatment Position in bed  -RW     Post Treatment Position bed  -RW     In Bed sitting EOB  -RW     Row Name 01/14/22 1408          Therapy Assessment/Plan (PT)    Rehab Potential (PT) good, to achieve stated therapy goals  -RW     Criteria for Skilled Interventions Met (PT) yes; meets criteria; skilled treatment is necessary  -RW           User Key  (r) = Recorded By, (t) = Taken By, (c) = Cosigned By    Initials Name Provider Type    RW Tavon Adams, PTA Physical Therapy Assistant                Physical Therapy Education                 Title: PT OT SLP Therapies (In Progress)     Topic: Physical Therapy (In Progress)     Point: Mobility training (In Progress)     Learning Progress Summary           Patient Acceptance, E,TB, NR by LR at 1/12/2022 1416    Comment: Educated on PT POC and goals.                   Point: Home exercise program (In Progress)     Learning Progress Summary           Patient Acceptance, E,TB, NR by LR at 1/12/2022 1416    Comment: Educated on PT POC and goals.                   Point: Body mechanics (In Progress)     Learning Progress Summary           Patient Acceptance, E,TB, NR by LR at 1/12/2022 1416    Comment: Educated on PT POC and goals.                   Point: Precautions (In Progress)      Learning Progress Summary           Patient Acceptance, E,TB, NR by LR at 1/12/2022 1416    Comment: Educated on PT POC and goals.                               User Key     Initials Effective Dates Name Provider Type Discipline    LR 06/16/21 -  Lucien Gilman Physical Therapist PT              PT Recommendation and Plan  Anticipated Discharge Disposition (PT): skilled nursing facility, home with 24/7 care  Therapy Frequency (PT): other (see comments) (5-7d/week)  Plan of Care Reviewed With: patient  Progress: improving  Outcome Summary: pt agreeable to eob and does with sba. stands with cga and with fww and followed by wc amb 36,42,40,30'. seated eob back at room. spouse at bedside.vss o2 at 99 on 2liters.       Time Calculation:    PT Charges     Row Name 01/14/22 1559             Time Calculation    Start Time 1408  -RW      Stop Time 1452  -RW      Time Calculation (min) 44 min  -RW              Time Calculation- PT    Total Timed Code Minutes- PT 44 minute(s)  -RW              Timed Charges    61570 - PT Therapeutic Activity Minutes 44  -RW              Total Minutes    Timed Charges Total Minutes 44  -RW       Total Minutes 44  -RW            User Key  (r) = Recorded By, (t) = Taken By, (c) = Cosigned By    Initials Name Provider Type    RW Tavon Adams PTA Physical Therapy Assistant              Therapy Charges for Today     Code Description Service Date Service Provider Modifiers Qty    25174791413 HC PT THERAPEUTIC ACT EA 15 MIN 1/13/2022 Tavon Adams PTA GP 3    84217227527 HC PT THERAPEUTIC ACT EA 15 MIN 1/14/2022 Tavon Adams PTA GP 3          PT G-Codes  Outcome Measure Options: AM-PAC 6 Clicks Daily Activity (OT)  AM-PAC 6 Clicks Score (PT): 18  AM-PAC 6 Clicks Score (OT): 11    Tavon Adams PTA  1/14/2022

## 2022-01-14 NOTE — PROGRESS NOTES
"[facility]  [address] [place] [address]. [address]  T - [phone]  F - [phone]     Progress Note          PATIENT  DEMOGRAPHICS   PATIENT NAME: Yamileth Slater                      PHYSICIAN: Ace Lauren MD  : 1960  MRN: [id number]   LOS: 4 days    Patient Care Team:  Rianna Macias MD as PCP - General (Family Medicine)  Subjective   SUBJECTIVE   Seen during hd  Blood flow is only 200ml/min  No soa         Objective   OBJECTIVE   Vital Signs  Temp:  [96.6 °F (35.9 °C)-97.1 °F (36.2 °C)] 96.6 °F (35.9 °C)  Heart Rate:  [55-74] 64  Resp:  [16-18] 16  BP: ()/(51-74) 131/67    Flowsheet Rows      First Filed Value   Admission Height 167.6 cm (66\") Documented at 2022 1304   Admission Weight 132 kg (291 lb 9.6 oz) Documented at 2022 1048           I/O last 3 completed shifts:  In: 480 [P.O.:480]  Out: 50 [Urine:50]    PHYSICAL EXAM    Physical Exam  Constitutional:       Appearance: She is well-developed.   HENT:      Head: Normocephalic.   Eyes:      Pupils: Pupils are equal, round, and reactive to light.   Cardiovascular:      Rate and Rhythm: Normal rate and regular rhythm.      Heart sounds: Normal heart sounds.   Pulmonary:      Effort: Pulmonary effort is normal.      Breath sounds: Normal breath sounds.   Abdominal:      General: Bowel sounds are normal.      Palpations: Abdomen is soft.   Musculoskeletal:         General: No swelling.   Skin:     Coloration: Skin is not jaundiced.   Neurological:      Mental Status: She is alert. She is disoriented.         RESULTS   Results Review:    Results from last 7 days   Lab Units 22  0621 22  0621 22  0432 22  1618 22  1214   SODIUM mmol/L 126* 132* 135*   < > 129*   POTASSIUM mmol/L 3.8 3.8 3.6   < > 4.0   CHLORIDE mmol/L 91* 97* 99   < > 87*   CO2 mmol/L 17.0* 22.0 19.0*   < > 18.0*   BUN mg/dL 49* 43* 57*   < > 44*   CREATININE mg/dL 7.79* 5.91* 6.73*   < > 3.46* "   CALCIUM mg/dL 7.9* 8.3* 7.9*   < > 9.0   BILIRUBIN mg/dL  --   --   --   --  0.2   ALK PHOS U/L  --   --   --   --  192*   ALT (SGPT) U/L  --   --   --   --  7   AST (SGOT) U/L  --   --   --   --  10   GLUCOSE mg/dL 232* 129* 313*   < > 800*    < > = values in this interval not displayed.       Estimated Creatinine Clearance: 10.4 mL/min (A) (by C-G formula based on SCr of 7.79 mg/dL (H)).    Results from last 7 days   Lab Units 01/13/22  0621 01/12/22  1950 01/12/22  1634 01/09/22  1618 01/09/22  1214   MAGNESIUM mg/dL  --   --   --   --  1.9   PHOSPHORUS mg/dL 6.0* 4.1 4.0   < > 6.1*    < > = values in this interval not displayed.             Results from last 7 days   Lab Units 01/14/22  0621 01/13/22  0621 01/12/22  0432 01/11/22  0401 01/10/22  0419   WBC 10*3/mm3 7.66 9.06 9.07 11.07* 11.34*   HEMOGLOBIN g/dL 8.0* 8.5* 8.6* 8.9* 8.5*   PLATELETS 10*3/mm3 204 220 202 226 259       Results from last 7 days   Lab Units 01/09/22  1214   INR  1.10         Imaging Results (Last 24 Hours)     Procedure Component Value Units Date/Time    XR Pelvis 1 or 2 View [[id number]] Collected: 01/13/22 1750     Updated: 01/13/22 1842    Narrative:      EXAMINATION: XR PELVIS 1 VIEW    INDICATION: fall onto buttocks, right-sided pain. E13.11 Other  specified diabetes mellitus with ketoacidosis with coma I10  Essential (primary) hypertension R41.82 Altered mental status,  unspecified R13.10 Dysphagia, unspecified Z74.09 Other reduced  mobility Z74.09 Other reduced mobility Z78.9 Other specified  health status    TECHNIQUE: AP frontal radiograph pelvis.    COMPARISON: 10/5/2020.    FINDINGS:     Bowel gas overlies portions of pelvis and sacrum, reducing  sensitivity of evaluation of these regions. No gross displaced  fracture is identified on single frontal radiographic image.    Extensive atherosclerotic vascular disease.        Impression:        No gross displaced fracture evident on frontal radiographic  image. If there is  persistent clinical concern, would recommend  repeat imaging or cross-sectional imaging for increased  sensitivity.    Electronically signed by:  Jm Mckeon MD  1/13/2022 6:41 PM  CST Workstation: [id number]    CT Head Without Contrast [[id number]] Collected: 01/13/22 1239     Updated: 01/13/22 1354    Narrative:      EXAM: CT HEAD WO CONTRAST    DATE: 1/13/2022 12:39 PM CST    TECHNIQUE: Axial images were obtained at 5 mm intervals from the  level of the skull base to the vertex. No IV contrast was  administered. CTDI 50.10    This exam was performed according to our departmental  dose-optimization program, which includes automated exposure  control, adjustment of the mA and/or kV according to patient size  and/or use of iterative reconstruction technique.    CLINICAL INDICATION: fall, E13.11 Other specified diabetes  mellitus with ketoacidosis with coma I10 Essential (primary)  hypertension R41.82 Altered mental status, unspecified R13.10  Dysphagia, unspecified Z74.09 Other reduced mobility Z74.09 Other  reduced mobility Z78.9 Other specified health status    COMPARISON: None available.    FINDINGS: There is no evidence of an intracranial mass or  hemorrhage. The ventricles are normal in size and configuration.  There is evidence of mild chronic ischemic microvascular disease  with mild decreased attenuation in the periventricular deep white  matter unchanged from the prior exam earlier this month. There is  no evidence of acute ischemia/macro infarction. No extra-axial  fluid collections are identified.    Bone window images are within normal limits. The left frontal  sinus demonstrates a small mucous retention cyst as seen on prior  exams.      Impression:        1. Stable appearance of the brain without radiographic evidence  of acute intracranial pathology.    Electronically signed by:  Hilda Orellana MD  1/13/2022 1:53 PM  CST Workstation: [id number]           MEDICATIONS    aspirin, 81 mg, Oral,  Daily  atorvastatin, 20 mg, Oral, Daily  bumetanide, 2 mg, Oral, Once per day on Sun Tue Thu Sat  cefdinir, 300 mg, Oral, Once per day on Sun Mon Wed Fri  clopidogrel, 75 mg, Oral, Daily  gabapentin, 300 mg, Oral, Q8H  heparin (porcine), 5,000 Units, Subcutaneous, Q8H  insulin aspart, 0-24 Units, Subcutaneous, TID AC  insulin aspart, 30 Units, Subcutaneous, TID With Meals  insulin detemir, 30 Units, Subcutaneous, Q12H  ipratropium-albuterol, 3 mL, Nebulization, 4x Daily - RT  isosorbide mononitrate, 30 mg, Oral, QAM  levothyroxine, 25 mcg, Oral, Daily  lisinopril, 5 mg, Oral, Daily  metoprolol tartrate, 25 mg, Oral, Q12H  midodrine, 5 mg, Oral, Once per day on Mon Wed Fri  montelukast, 10 mg, Oral, Nightly  nystatin, , Topical, Q12H  oxybutynin XL, 5 mg, Oral, Daily  pantoprazole, 40 mg, Oral, Q AM  ranolazine, 500 mg, Oral, Q12H  senna-docusate sodium, 2 tablet, Oral, BID  sodium chloride, 10 mL, Intravenous, Q12H      O2, 3 L/min, Last Rate: 3 L/min (01/09/22 1809)  sodium chloride, 10 mL/hr        Assessment/Plan   ASSESSMENT / PLAN      Fall    Hypertension    COPD (chronic obstructive pulmonary disease) (HCC)    Coronary artery disease    Hypothyroidism    MIKE and COPD overlap syndrome ([facility])    Acute cystitis with hematuria    Unspecified diastolic (congestive) heart failure (HCC)    Anemia due to chronic kidney disease, on chronic dialysis ([facility])    Personal history of noncompliance with medical treatment, presenting hazards to health    Type 2 diabetes mellitus with kidney complication, with long-term current use of insulin ([facility])    Physical deconditioning    1.  ESRD on HD- Initiated HD on 10/21 due to hyperkalemia and fluid overload, now  ESRD.  HD MWF for now.  Keep bumex 2mg on non dialysis days. Euvolemic at present. Cr much higher this admission pre dialysis than it used to be ?due to poor catheter flow vs loss of function.    keep midodrine pre dialysis  . Activase today. If catheter not working next  week then needs line exchange, will evaluate after Monday hd     2.  Hyponatremia- Na 129 in the setting of severe hyperglycemia, hyperosmolar hyponatremia. Now better with glucose correction     3.  HHS-started on insulin drip, managed per primary team. Off insulin drip. On aspart and levemir     4.  History of CAD     5.  History of COPD - On trilogy at night but did not use it last night     6.  Anemia / previous GI bleed / b12 deficiency-  s/p capsule endoscopy which showed few small non-bleeding intestinal AVMs and single small polyp. hgb stable     7.  HTN- Continue home antihypertensives. Metoprolol lower to 25mg    dispo awaiting snf approval                This document has been electronically signed by Ace Lauren MD on January 14, 2022 10:31 CST

## 2022-01-14 NOTE — PROGRESS NOTES
"    FAMILY MEDICINE RESIDENCY SERVICE  DAILY PROGRESS NOTE    NAME: Yamileth Slater  : 1960  MRN: [id number]      LOS: 4 days     PROVIDER OF SERVICE: Luz Quijano MD    Chief Complaint: Fall    Subjective:     Interval History:  History taken from: patient chart    Patient was seen awake resting comfortably this morning. No acute overnight events. Dialysis was being set up in room for patient. Yesterday morning patient assisted herself to the floor after she states her \"legs gave out\". CT head did not reveal acute intracranial abnormality. MRI could not be done due to body habitus. Pelvic x-ray did not show any gross displaced fracture. This morning patient is AOx3. She states her bottom hurts and will give her lidocaine patch.  She was previously at [facility] for rehab per PT recommendations and is interested in going to another rehab facility after being discharged. She states her  would like her to come home if she cannot go to [facility] but she is interested in getting stronger and going to a rehab facility. I informed her case management is currently working on finding her a facility. Pt denies chest pain, soa, back pain, dizziness, headache, blurry vision, n/v, abdominal discomfort or extremity pain.      Review of Systems:   Review of Systems   Constitutional: Negative.    Respiratory: Negative.  Negative for chest tightness.    Cardiovascular: Negative.    Gastrointestinal: Negative.    Endocrine: Negative.    Musculoskeletal: Negative for arthralgias, back pain and myalgias.   Skin: Negative.    Neurological: Negative.  Negative for dizziness, light-headedness and headaches.   Psychiatric/Behavioral: Negative.  Negative for agitation and suicidal ideas. The patient is not nervous/anxious.        Objective:     Vital Signs  Temp:  [96.6 °F (35.9 °C)-97.1 °F (36.2 °C)] 96.6 °F (35.9 °C)  Heart Rate:  [55-74] 66  Resp:  [16-18] 16  BP: ()/(51-74) 106/55  Flow (L/min):  [2] 2   Body mass index " is 46.54 kg/m².    Physical Exam  Physical Exam  Constitutional:       Appearance: Normal appearance. She is normal weight.   HENT:      Head: Normocephalic and atraumatic.      Nose: Nose normal.      Mouth/Throat:      Mouth: Mucous membranes are moist.   Cardiovascular:      Rate and Rhythm: Normal rate and regular rhythm.      Pulses: Normal pulses.      Heart sounds: Normal heart sounds.   Pulmonary:      Effort: Pulmonary effort is normal. No respiratory distress.      Breath sounds: Normal breath sounds.   Abdominal:      General: Abdomen is flat. Bowel sounds are normal. There is no distension.      Palpations: Abdomen is soft.      Tenderness: There is no abdominal tenderness.   Musculoskeletal:         General: Normal range of motion.      Cervical back: Normal range of motion.      Right lower leg: No edema.      Left lower leg: No edema.   Skin:     General: Skin is warm and dry.      Capillary Refill: Capillary refill takes less than 2 seconds.   Neurological:      General: No focal deficit present.      Mental Status: She is alert and oriented to person, place, and time.   Psychiatric:         Mood and Affect: Mood normal.         Behavior: Behavior normal.         Scheduled Meds   alteplase, 2 mg, Intracatheter, Once in Dialysis  aspirin, 81 mg, Oral, Daily  atorvastatin, 20 mg, Oral, Daily  bumetanide, 2 mg, Oral, Once per day on Sun Tue Thu Sat  cefdinir, 300 mg, Oral, Once per day on Sun Mon Wed Fri  clopidogrel, 75 mg, Oral, Daily  gabapentin, 300 mg, Oral, Q8H  heparin (porcine), 5,000 Units, Subcutaneous, Q8H  insulin aspart, 0-24 Units, Subcutaneous, TID AC  insulin aspart, 30 Units, Subcutaneous, TID With Meals  insulin detemir, 30 Units, Subcutaneous, Q12H  ipratropium-albuterol, 3 mL, Nebulization, 4x Daily - RT  isosorbide mononitrate, 30 mg, Oral, QAM  levothyroxine, 25 mcg, Oral, Daily  lisinopril, 5 mg, Oral, Daily  metoprolol tartrate, 25 mg, Oral, Q12H  midodrine, 5 mg, Oral, Once per  day on Mon Wed Fri  montelukast, 10 mg, Oral, Nightly  nystatin, , Topical, Q12H  oxybutynin XL, 5 mg, Oral, Daily  pantoprazole, 40 mg, Oral, Q AM  ranolazine, 500 mg, Oral, Q12H  senna-docusate sodium, 2 tablet, Oral, BID  sodium chloride, 10 mL, Intravenous, Q12H       PRN Meds   •  acetaminophen **OR** acetaminophen **OR** acetaminophen  •  albumin human  •  albuterol  •  senna-docusate sodium **AND** polyethylene glycol **AND** bisacodyl **AND** bisacodyl  •  dextrose  •  dextrose  •  dextrose  •  glucagon (human recombinant)  •  heparin (porcine)  •  hydrALAZINE  •  hydrocortisone-bacitracin-zinc oxide-nystatin  •  labetalol  •  Morphine  •  ondansetron  •  sodium chloride  •  sodium chloride  •  sodium chloride  •  sodium chloride      Diagnostic Data    Results from last 7 days   Lab Units 01/14/22  0621 01/09/22  1618 01/09/22  1214   WBC 10*3/mm3 7.66   < > 17.78*   HEMOGLOBIN g/dL 8.0*   < > 7.0*   HEMATOCRIT % 24.7*   < > 22.1*   PLATELETS 10*3/mm3 204   < > 423   GLUCOSE mg/dL 232*   < > 800*   CREATININE mg/dL 7.79*   < > 3.46*   BUN mg/dL 49*   < > 44*   SODIUM mmol/L 126*   < > 129*   POTASSIUM mmol/L 3.8   < > 4.0   AST (SGOT) U/L  --   --  10   ALT (SGPT) U/L  --   --  7   ALK PHOS U/L  --   --  192*   BILIRUBIN mg/dL  --   --  0.2   ANION GAP mmol/L 18.0*   < > 24.0*    < > = values in this interval not displayed.       CT Head Without Contrast    Result Date: 1/13/2022  1. Stable appearance of the brain without radiographic evidence of acute intracranial pathology. Electronically signed by:  Hilda Orellana MD  1/13/2022 1:53 PM CST Workstation: [id number]    XR Pelvis 1 or 2 View    Result Date: 1/13/2022  No gross displaced fracture evident on frontal radiographic image. If there is persistent clinical concern, would recommend repeat imaging or cross-sectional imaging for increased sensitivity. Electronically signed by:  Jm Mckeon MD  1/13/2022 6:41 PM CST Workstation: [id number]  reviewed the patient's new clinical results.    Assessment/Plan:     Active Hospital Problems    Diagnosis  POA   • **Fall [W19.XXXA]  No     Priority: High     - witnessed fall without head trauma on 1/13  - No LOC; Patient states her legs gave out  - 1/9: CT head for AMS which has since resolved.    - 1/13 : Repeat CT of head - No acute intracranial pathology. Unchanged since admission CT    - 1/14: Patient AOx4 on exam, Lidocaine patch for buttock discomfort  -1/13: Xray pelvis   -No gross displaced fracture evident      - MRI unable to be done due to body habitus          • Physical deconditioning [R53.81]  Yes     Priority: High     - PT/OT   - SNF placement pending      • Type 2 diabetes mellitus with kidney complication, with long-term current use of insulin ([facility]) [E11.29, Z79.4]  Not Applicable     Priority: High     - Levemir 30units Q12  - Novolog 30 with meals  - SSI  -HgA1c 10/2021 : 8.80       • Acute cystitis with hematuria [N30.01]  No     Priority: High     1/13: IV Rocephin 1 gm q 24  1/14 : transition to omnicef 300mg - renally dosed for 4 doses to have a 5 dose treatment course   1/14: Urine culture does not show growth      • Personal history of noncompliance with medical treatment, presenting hazards to health [Z91.19]  Not Applicable     Priority: Medium     · Difficulty showing up to clinic visits due to complexity of medical problems and transportation  · Would really benefit from a couple home visits from PCP to help improve compliance       • Anemia due to chronic kidney disease, on chronic dialysis ([facility]) [N18.6, D63.1, Z99.2]  Not Applicable     Priority: Low     - MWF Dialysis patient  - Epoetin (Retacrit) 10,000 units three times a week on dialysis days MWF  - Hg 7 on admission. Transfuse if Hg less than 7   - 1/14: Hg 8   - Daily CBC   - Type and screen done            • MIKE and COPD overlap syndrome ([facility]) [G47.33, J44.9]  Yes     Priority: Low     · Home Trilogy/CPAP  · Home 3L oxygen  dependent. Maintain strict Sats between 88-92%      • Hypothyroidism [E03.9]  Yes     Priority: Low     Continue home Levothyroxine 25mcg, stable     • Coronary artery disease [I25.10]  Yes     Priority: Low     · S/P CABG x 3 (Primary), Bilateral carotid artery stenosis w/ 2 stents on left side,  PAD (peripheral artery disease) with stent in right upper leg  · Continue   - aspirin 81mg   - Plavix 75mg daily  - atorvastatin 20 mg daily  - lisinopril 5mg daily  -lopressor 25mg BID  -Imdur 30mg daily  - Ranexa - 500mg BID   · EKG - sinus tachycardia             • COPD (chronic obstructive pulmonary disease) (HCC) [J44.9]  Yes     Priority: Low     - Dependent on 3L NC at Home  - Maintain strict sats between 88-92%  -Singulair 10mg   -DuoNebs  -NIPPV via Respiratory. Patient uses Trilogy at home/night      • Hypertension [I10]  Yes     Priority: Low       · Metoprolol 25mg bid, hold if HR < 60bpm  · Lisinopril 5mg daily  · Telemetry  · Hydralazine 10mg prn q6h for SBP > 170  · Labetalol 20mg Q6h >160       • Unspecified diastolic (congestive) heart failure (HCC) [I50.30]  Yes     - Continue Imdur, metoprolol, lisinopril, bumex  - pro-BNP 92119 on admission  - Echo 1/10/2022 : EF 61-65%  -Cardiology on board (Dr. Handy) - appreciate recommendations  - MWF Dialysis patient         DVT prophylaxis: Heparin  Code status is   Code Status and Medical Interventions:   Ordered at: 01/09/22 1508     Level Of Support Discussed With:    Next of Kin (If No Surrogate)     Code Status (Patient has no pulse and is not breathing):    CPR (Attempt to Resuscitate)     Medical Interventions (Patient has pulse or is breathing):    Full Support     Comments:    Discussed with  Huy Slater at bedside       Plan for disposition:Where: rehab and When:  1-2days pending placement       Time: 20 mins         This document has been electronically signed by Luz Quijano MD on January 14, 2022 12:10 CST

## 2022-01-14 NOTE — PLAN OF CARE
Goal Outcome Evaluation:              Outcome Summary: Patient rested well this evening on CPAP, patient free from injury and fall at this time. Patient is alert and oriented with no hallucinations. VSS. Patient to have dialysis in AM. Patients blood sugars have been okay at this time. Bladder scan obtained due to low output with only 70 mls shown, rechecked bladder scan this am showed increase to 209

## 2022-01-14 NOTE — SIGNIFICANT NOTE
01/14/22 0959   OTHER   Discipline occupational therapist   Rehab Time/Intention   Session Not Performed patient unavailable for treatment   OT tx attempted; pt getting HD. Will f/u as time permits

## 2022-01-14 NOTE — CONSULTS
Adult Nutrition  Assessment    Patient Name:  Yamileth Slater  YOB: 1960  MRN: [id number]  Admit Date:  1/9/2022    Assessment Date:  1/14/2022    Comments:  Pt currently receiving dialysis.  She is alert and seems to be back at her baseline.  She reports that finances are an issue when it comes to getting healthy food.  They have food stamps.  RD provided pt with some community resources for food assistance.  Rd also discussed with pt the importance of portion control in regards especially to carbs.  We also discussed renal diet and the need for low sodium.  Diet copies and contact number provided.  MD noted that Creat is higher than her norm--?? Poor catheter flow.  Rd will monitor POC.      Reason for Assessment     Row Name 01/14/22 140          Reason for Assessment    Reason For Assessment follow-up protocol                Nutrition/Diet History     Row Name 01/14/22 1405          Nutrition/Diet History    Typical Food/Fluid Intake Pt currently receiving dialysis.  Dialysis nurse is present and she reports that she still has urine output.  Her main problem is her uncontrolled DM.  Pt said that she loves carbs and loves [employer] noodles.  ?? catheter flow.                  Labs/Tests/Procedures/Meds     Row Name 01/14/22 1407          Labs/Procedures/Meds    Lab Results Reviewed reviewed, pertinent     Lab Results Comments Na 126; Gluc 191/169/232/166; Bun 49; Creat 7.79; Phos 6.0            Diagnostic Tests/Procedures    Diagnostic Test/Procedure Reviewed reviewed, pertinent     Diagnostic Test/Procedures Comments Dialysis            Medications    Pertinent Medications Reviewed reviewed, pertinent     Pertinent Medications Comments Bumex; SSI; Novolog with meals; Levemir; Midodrine prior to dialysis                Physical Findings     Row Name 01/14/22 1410          Physical Findings    Overall Physical Appearance obese; on oxygen therapy                  Nutrition Prescription Ordered      Row Name 01/14/22 1410          Nutrition Prescription PO    Current PO Diet Regular     Fluid Consistency Thin     Common Modifiers Cardiac; Consistent Carbohydrate; Renal; Fluid Restriction     Fluid Restriction mL per Tray 250 mL     Fluid Restriction mL per Day 1500 mL                Evaluation of Received Nutrient/Fluid Intake     Row Name 01/14/22 1410          PO Evaluation    Number of Days PO Intake Evaluated 2 days     Number of Meals 5     % PO Intake 75%                     Electronically signed by:  Gissel Littlejohn RD  01/14/22 14:15 CST

## 2022-01-14 NOTE — SIGNIFICANT NOTE
01/14/22 1137   OTHER   Discipline physical therapy assistant   Rehab Time/Intention   Session Not Performed patient unavailable for treatment   Having dialysis

## 2022-01-14 NOTE — PLAN OF CARE
Problem: Fall Injury Risk  Goal: Absence of Fall and Fall-Related Injury  Outcome: Ongoing, Progressing  Intervention: Promote Injury-Free Environment  Recent Flowsheet Documentation  Taken 1/14/2022 1414 by Jef Woods RN  Safety Promotion/Fall Prevention: safety round/check completed  Taken 1/14/2022 1302 by Jef Woods RN  Safety Promotion/Fall Prevention: safety round/check completed  Taken 1/14/2022 1252 by Jef Woods RN  Safety Promotion/Fall Prevention: safety round/check completed  Taken 1/14/2022 1100 by Jef Woods RN  Safety Promotion/Fall Prevention: safety round/check completed  Taken 1/14/2022 0924 by Jef Woods RN  Safety Promotion/Fall Prevention: safety round/check completed     Problem: Skin Injury Risk Increased  Goal: Skin Health and Integrity  Outcome: Ongoing, Progressing     Problem: Adult Inpatient Plan of Care  Goal: Plan of Care Review  Outcome: Ongoing, Progressing  Flowsheets  Taken 1/14/2022 1420 by Jef Woods RN  Progress: improving  Outcome Summary: pt alert x4. had dialysis. working with PT VSS. continue to monitor  Taken 1/13/2022 1701 by Milly Ugarte RN  Plan of Care Reviewed With: patient  Goal: Patient-Specific Goal (Individualized)  Outcome: Ongoing, Progressing  Goal: Absence of Hospital-Acquired Illness or Injury  Outcome: Ongoing, Progressing  Intervention: Identify and Manage Fall Risk  Recent Flowsheet Documentation  Taken 1/14/2022 1414 by Jef Woods RN  Safety Promotion/Fall Prevention: safety round/check completed  Taken 1/14/2022 1302 by Jef Woods RN  Safety Promotion/Fall Prevention: safety round/check completed  Taken 1/14/2022 1252 by Jef Woods RN  Safety Promotion/Fall Prevention: safety round/check completed  Taken 1/14/2022 1100 by Jef Woods RN  Safety Promotion/Fall Prevention: safety round/check completed  Taken 1/14/2022 0924 by Jef Woods RN  Safety Promotion/Fall  Prevention: safety round/check completed  Intervention: Prevent and Manage VTE (venous thromboembolism) Risk  Recent Flowsheet Documentation  Taken 1/14/2022 1252 by Jef Woods RN  VTE Prevention/Management:   bilateral   sequential compression devices off  Goal: Optimal Comfort and Wellbeing  Outcome: Ongoing, Progressing  Intervention: Provide Person-Centered Care  Recent Flowsheet Documentation  Taken 1/14/2022 1252 by Jef Woods RN  Trust Relationship/Rapport: care explained  Goal: Readiness for Transition of Care  Outcome: Ongoing, Progressing     Problem: COPD Comorbidity  Goal: Maintenance of COPD Symptom Control  Outcome: Ongoing, Progressing     Problem: Diabetes Comorbidity  Goal: Blood Glucose Level Within Desired Range  Outcome: Ongoing, Progressing  Intervention: Maintain Glycemic Control  Recent Flowsheet Documentation  Taken 1/14/2022 1252 by Jef Woods RN  Glycemic Management: blood glucose monitoring     Problem: Hypertension Comorbidity  Goal: Blood Pressure in Desired Range  Outcome: Ongoing, Progressing     Problem: Obstructive Sleep Apnea Risk or Actual (Comorbidity Management)  Goal: Unobstructed Breathing During Sleep  Outcome: Ongoing, Progressing     Problem: Pain Chronic (Persistent) (Comorbidity Management)  Goal: Acceptable Pain Control and Functional Ability  Outcome: Ongoing, Progressing     Problem: Hyperosmolar Hyperglycemic State  Goal: Serum Glucose and Electrolytes Within Desired Range  Outcome: Ongoing, Progressing  Intervention: Monitor and Manage HHS  Recent Flowsheet Documentation  Taken 1/14/2022 1252 by Jef Woods RN  Glycemic Management: blood glucose monitoring     Problem: Diabetic Ketoacidosis  Goal: Fluid and Electrolyte Balance with Absence of Ketosis  Outcome: Ongoing, Progressing  Intervention: Monitor and Manage Ketoacidosis  Recent Flowsheet Documentation  Taken 1/14/2022 1252 by Jef Woods RN  Glycemic Management: blood  glucose monitoring     Problem: Confusion Acute  Goal: Optimal Cognitive Function  Outcome: Ongoing, Progressing     Problem: Noninvasive Ventilation Acute  Goal: Effective Unassisted Ventilation and Oxygenation  Outcome: Ongoing, Progressing   Goal Outcome Evaluation:           Progress: improving  Outcome Summary: pt alert x4. had dialysis. working with PT VSS. continue to monitor

## 2022-01-14 NOTE — PLAN OF CARE
Goal Outcome Evaluation:  Plan of Care Reviewed With: patient        Progress: improving  Outcome Summary: pt agreeable to eob and does with sba. stands with cga and with fww and followed by w[facility] amb 36,42,40,30'. seated eob back at room. spouse at bedside.vss o2 at 99 on 2liters.

## 2022-01-15 PROBLEM — N30.01 ACUTE CYSTITIS WITH HEMATURIA: Status: RESOLVED | Noted: 2021-03-31 | Resolved: 2022-01-15

## 2022-01-15 LAB
ANION GAP SERPL CALCULATED.3IONS-SCNC: 14 MMOL/L (ref 5–15)
BUN SERPL-MCNC: 41 MG/DL (ref 8–23)
BUN/CREAT SERPL: 5.7 (ref 7–25)
CALCIUM SPEC-SCNC: 8 MG/DL (ref 8.6–10.5)
CHLORIDE SERPL-SCNC: 93 MMOL/L (ref 98–107)
CO2 SERPL-SCNC: 23 MMOL/L (ref 22–29)
CREAT SERPL-MCNC: 7.23 MG/DL (ref 0.57–1)
DEPRECATED RDW RBC AUTO: 46.6 FL (ref 37–54)
ERYTHROCYTE [DISTWIDTH] IN BLOOD BY AUTOMATED COUNT: 14.6 % (ref 12.3–15.4)
GFR SERPL CREATININE-BSD FRML MDRD: 6 ML/MIN/1.73
GFR SERPL CREATININE-BSD FRML MDRD: ABNORMAL ML/MIN/{1.73_M2}
GLUCOSE BLDC GLUCOMTR-MCNC: 151 MG/DL (ref 70–130)
GLUCOSE BLDC GLUCOMTR-MCNC: 289 MG/DL (ref 70–130)
GLUCOSE BLDC GLUCOMTR-MCNC: 89 MG/DL (ref 70–130)
GLUCOSE BLDC GLUCOMTR-MCNC: 98 MG/DL (ref 70–130)
GLUCOSE SERPL-MCNC: 179 MG/DL (ref 65–99)
HCT VFR BLD AUTO: 24.6 % (ref 34–46.6)
HGB BLD-MCNC: 7.7 G/DL (ref 12–15.9)
MCH RBC QN AUTO: 27.3 PG (ref 26.6–33)
MCHC RBC AUTO-ENTMCNC: 31.3 G/DL (ref 31.5–35.7)
MCV RBC AUTO: 87.2 FL (ref 79–97)
PLATELET # BLD AUTO: 225 10*3/MM3 (ref 140–450)
PMV BLD AUTO: 9.4 FL (ref 6–12)
POTASSIUM SERPL-SCNC: 4.7 MMOL/L (ref 3.5–5.2)
RBC # BLD AUTO: 2.82 10*6/MM3 (ref 3.77–5.28)
SODIUM SERPL-SCNC: 130 MMOL/L (ref 136–145)
WBC NRBC COR # BLD: 8.68 10*3/MM3 (ref 3.4–10.8)

## 2022-01-15 PROCEDURE — 80048 BASIC METABOLIC PNL TOTAL CA: CPT | Performed by: STUDENT IN AN ORGANIZED HEALTH CARE EDUCATION/TRAINING PROGRAM

## 2022-01-15 PROCEDURE — 99231 SBSQ HOSP IP/OBS SF/LOW 25: CPT | Performed by: STUDENT IN AN ORGANIZED HEALTH CARE EDUCATION/TRAINING PROGRAM

## 2022-01-15 PROCEDURE — 63710000001 INSULIN DETEMIR PER 5 UNITS: Performed by: STUDENT IN AN ORGANIZED HEALTH CARE EDUCATION/TRAINING PROGRAM

## 2022-01-15 PROCEDURE — 94799 UNLISTED PULMONARY SVC/PX: CPT

## 2022-01-15 PROCEDURE — 36415 COLL VENOUS BLD VENIPUNCTURE: CPT | Performed by: STUDENT IN AN ORGANIZED HEALTH CARE EDUCATION/TRAINING PROGRAM

## 2022-01-15 PROCEDURE — 85027 COMPLETE CBC AUTOMATED: CPT | Performed by: STUDENT IN AN ORGANIZED HEALTH CARE EDUCATION/TRAINING PROGRAM

## 2022-01-15 PROCEDURE — 82962 GLUCOSE BLOOD TEST: CPT

## 2022-01-15 PROCEDURE — 63710000001 INSULIN ASPART PER 5 UNITS: Performed by: STUDENT IN AN ORGANIZED HEALTH CARE EDUCATION/TRAINING PROGRAM

## 2022-01-15 PROCEDURE — 94760 N-INVAS EAR/PLS OXIMETRY 1: CPT

## 2022-01-15 PROCEDURE — 25010000002 HEPARIN (PORCINE) PER 1000 UNITS: Performed by: STUDENT IN AN ORGANIZED HEALTH CARE EDUCATION/TRAINING PROGRAM

## 2022-01-15 PROCEDURE — 87040 BLOOD CULTURE FOR BACTERIA: CPT | Performed by: STUDENT IN AN ORGANIZED HEALTH CARE EDUCATION/TRAINING PROGRAM

## 2022-01-15 RX ORDER — MUSCLE RUB CREAM 100; 150 MG/G; MG/G
1 CREAM TOPICAL
Status: DISCONTINUED | OUTPATIENT
Start: 2022-01-15 | End: 2022-01-20 | Stop reason: HOSPADM

## 2022-01-15 RX ADMIN — IPRATROPIUM BROMIDE AND ALBUTEROL SULFATE 3 ML: 2.5; .5 SOLUTION RESPIRATORY (INHALATION) at 19:06

## 2022-01-15 RX ADMIN — SODIUM CHLORIDE, PRESERVATIVE FREE 10 ML: 5 INJECTION INTRAVENOUS at 21:36

## 2022-01-15 RX ADMIN — INSULIN ASPART 12 UNITS: 100 INJECTION, SOLUTION INTRAVENOUS; SUBCUTANEOUS at 11:37

## 2022-01-15 RX ADMIN — SODIUM CHLORIDE, PRESERVATIVE FREE 10 ML: 5 INJECTION INTRAVENOUS at 08:30

## 2022-01-15 RX ADMIN — LISINOPRIL 5 MG: 5 TABLET ORAL at 08:35

## 2022-01-15 RX ADMIN — INSULIN ASPART 30 UNITS: 100 INJECTION, SOLUTION INTRAVENOUS; SUBCUTANEOUS at 08:40

## 2022-01-15 RX ADMIN — ATORVASTATIN CALCIUM 20 MG: 20 TABLET, FILM COATED ORAL at 08:35

## 2022-01-15 RX ADMIN — CLOPIDOGREL 75 MG: 75 TABLET, FILM COATED ORAL at 08:35

## 2022-01-15 RX ADMIN — LIDOCAINE 1 PATCH: 50 PATCH CUTANEOUS at 08:35

## 2022-01-15 RX ADMIN — GABAPENTIN 300 MG: 300 CAPSULE ORAL at 21:36

## 2022-01-15 RX ADMIN — RANOLAZINE 500 MG: 500 TABLET, FILM COATED, EXTENDED RELEASE ORAL at 08:34

## 2022-01-15 RX ADMIN — ISOSORBIDE MONONITRATE 30 MG: 30 TABLET, EXTENDED RELEASE ORAL at 06:06

## 2022-01-15 RX ADMIN — METOPROLOL TARTRATE 25 MG: 25 TABLET, FILM COATED ORAL at 21:43

## 2022-01-15 RX ADMIN — RANOLAZINE 500 MG: 500 TABLET, FILM COATED, EXTENDED RELEASE ORAL at 21:44

## 2022-01-15 RX ADMIN — HEPARIN SODIUM 5000 UNITS: 5000 INJECTION INTRAVENOUS; SUBCUTANEOUS at 21:36

## 2022-01-15 RX ADMIN — LEVOTHYROXINE SODIUM 25 MCG: 25 TABLET ORAL at 08:35

## 2022-01-15 RX ADMIN — OXYBUTYNIN CHLORIDE 5 MG: 5 TABLET, EXTENDED RELEASE ORAL at 08:35

## 2022-01-15 RX ADMIN — IPRATROPIUM BROMIDE AND ALBUTEROL SULFATE 3 ML: 2.5; .5 SOLUTION RESPIRATORY (INHALATION) at 08:17

## 2022-01-15 RX ADMIN — PANTOPRAZOLE SODIUM 40 MG: 40 TABLET, DELAYED RELEASE ORAL at 05:12

## 2022-01-15 RX ADMIN — NYSTATIN: 100000 POWDER TOPICAL at 08:36

## 2022-01-15 RX ADMIN — BUMETANIDE 2 MG: 1 TABLET ORAL at 08:35

## 2022-01-15 RX ADMIN — ASPIRIN 81 MG: 81 TABLET, FILM COATED ORAL at 08:35

## 2022-01-15 RX ADMIN — ACETAMINOPHEN 650 MG: 325 TABLET, FILM COATED ORAL at 16:55

## 2022-01-15 RX ADMIN — IPRATROPIUM BROMIDE AND ALBUTEROL SULFATE 3 ML: 2.5; .5 SOLUTION RESPIRATORY (INHALATION) at 11:10

## 2022-01-15 RX ADMIN — GABAPENTIN 300 MG: 300 CAPSULE ORAL at 05:12

## 2022-01-15 RX ADMIN — INSULIN ASPART 4 UNITS: 100 INJECTION, SOLUTION INTRAVENOUS; SUBCUTANEOUS at 08:36

## 2022-01-15 RX ADMIN — HEPARIN SODIUM 5000 UNITS: 5000 INJECTION INTRAVENOUS; SUBCUTANEOUS at 05:12

## 2022-01-15 RX ADMIN — HEPARIN SODIUM 5000 UNITS: 5000 INJECTION INTRAVENOUS; SUBCUTANEOUS at 13:42

## 2022-01-15 RX ADMIN — INSULIN ASPART 30 UNITS: 100 INJECTION, SOLUTION INTRAVENOUS; SUBCUTANEOUS at 11:37

## 2022-01-15 RX ADMIN — INSULIN DETEMIR 30 UNITS: 100 INJECTION, SOLUTION SUBCUTANEOUS at 08:36

## 2022-01-15 RX ADMIN — NYSTATIN: 100000 POWDER TOPICAL at 21:37

## 2022-01-15 RX ADMIN — METOPROLOL TARTRATE 25 MG: 25 TABLET, FILM COATED ORAL at 08:35

## 2022-01-15 RX ADMIN — INSULIN DETEMIR 30 UNITS: 100 INJECTION, SOLUTION SUBCUTANEOUS at 21:47

## 2022-01-15 RX ADMIN — GABAPENTIN 300 MG: 300 CAPSULE ORAL at 13:42

## 2022-01-15 RX ADMIN — MONTELUKAST 10 MG: 10 TABLET, FILM COATED ORAL at 21:36

## 2022-01-15 NOTE — PROGRESS NOTES
HD#[age] y.o. female with a CMH of ESRD on dialysis MWF (since 10/21/2021), CAD s/p CABG x3,  uncontrolled DM type 2, HFpEF, GERD, HTN, HLD, MIKE on CPAP who presents to the ED with AMS and noted HHS.    I was present with the resident during the entire history and physical exam. I discussed the case with the resident.  I have reviewed the notes, assessment and plan, and/or procedures performed by the resident. I concur with the resident’s documentation        Interval events:  Pt seen on rounds - and continues to have clear mental status/oriented X4.  Pt continues to report pain mostly in Rt buttocks - but notes improvement to Lidocaine patch.    Temp:  [96.2 °F (35.7 °C)-97.8 °F (36.6 °C)] 96.2 °F (35.7 °C)  Heart Rate:  [67-75] 67  Resp:  [18-20] 18  BP: (105-133)/(52-83) 128/59    GEN:  Alert, Oriented to person ( difficulties noted reciting her name), place, not time  HEENT:  NCAT  HEART:  S1, S2  LUNGS:  CTA bilaterally  ABD:  + BS, soft, NT, ND  EXT:  No focal ttp over bony prominces, no pitting edema    A/P: 61 y.o. female admitted with:  Active Hospital Problems    Diagnosis  POA   • **Fall [W19.XXXA]  No   • Physical deconditioning [R53.81]  Yes   • Type 2 diabetes mellitus with kidney complication, with long-term current use of insulin ([facility]) [E11.29, Z79.4]  Not Applicable   • Personal history of noncompliance with medical treatment, presenting hazards to health [Z91.19]  Not Applicable   • Anemia due to chronic kidney disease, on chronic dialysis (HCC) [N18.6, D63.1, Z99.2]  Not Applicable   • Unspecified diastolic (congestive) heart failure (HCC) [I50.30]  Yes   • MIKE and COPD overlap syndrome ([facility]) [G47.33, J44.9]  Yes   • Hypothyroidism [E03.9]  Yes   • Coronary artery disease [I25.10]  Yes   • COPD (chronic obstructive pulmonary disease) ([facility]) [J44.9]  Yes   • Hypertension [I10]  Yes      Resolved Hospital Problems    Diagnosis Date Resolved POA   • Diabetic acidosis (HCC) [E11.10] 01/10/2022 Yes   •  SIRS (systemic inflammatory response syndrome) (HCC) [R65.10] 01/11/2022 Yes   • Altered mental status [R41.82] 01/11/2022 Yes   • CAD (coronary artery disease) [I25.10] 01/09/2022 Yes   • Acute cystitis with hematuria [N30.01] 01/15/2022 No   • Type 2 diabetes mellitus with hyperosmolar hyperglycemic state (HHS) (HCC) [E11.00, E11.65] 01/11/2022 Yes     PLAN:  Continue current management plan.  Awaiting SNF.  Noted concern for HD catheter- unclear if it would be ideal to plan to replace on Mon. Prior to next HD session and/or if Nephro will need to ensure that new catheter is functional prior to DC.          Signature    This document has been electronically signed by Radha Argueta MD on January 15, 2022 15:54 CST

## 2022-01-15 NOTE — SIGNIFICANT NOTE
Spoke with Nephrology regarding possible replacement of dialysis catheter. Patient is clinically stable from our standpoint and awaiting SNF placement. SNF referrals have been sent out by case management. Nephrology would like to wait until HD on Monday and then reassess the need for catheter replacement. They will contact CT surgery if replacement is necessary prior to discharge.         This document has been electronically signed by uLz Quijano MD on January 15, 2022 15:16 CST

## 2022-01-15 NOTE — PROGRESS NOTES
HD#6-61 y.o. female with a CMH of ESRD on dialysis MWF (since 10/21/2021), CAD s/p CABG x3,  uncontrolled DM type 2, HFpEF, GERD, HTN, HLD, MIKE on CPAP who presents to the ED with AMS and noted HHS.    I was present with the resident during the entire history and physical exam. I discussed the case with the resident.  I have reviewed the notes, assessment and plan, and/or procedures performed by the resident. I concur with the resident’s documentation        Interval events:  Pt seen on rounds - and continues to have clear mental status/oriented X4.  Pt continues to report pain mostly in Rt buttocks - but notes improvement to Lidocaine patch. Noted + blood ctx (1/2) bottles from 1/13 which was a repeat from 1/9 also + thought to be contaminent (Micrococcus).    Temp:  [96.2 °F (35.7 °C)-97.8 °F (36.6 °C)] 96.2 °F (35.7 °C)  Heart Rate:  [67-75] 67  Resp:  [18-20] 18  BP: (105-133)/(52-83) 128/59    GEN:  Alert, Oriented to person ( difficulties noted reciting her name), place, not time  HEENT:  NCAT  HEART:  S1, S2  LUNGS:  CTA bilaterally  ABD:  + BS, soft, NT, ND  EXT:  No focal ttp over bony prominces, no pitting edema    A/P: 61 y.o. female admitted with:  Active Hospital Problems    Diagnosis  POA   • **Fall [W19.XXXA]  No   • Physical deconditioning [R53.81]  Yes   • Type 2 diabetes mellitus with kidney complication, with long-term current use of insulin (HCC) [E11.29, Z79.4]  Not Applicable   • Personal history of noncompliance with medical treatment, presenting hazards to health [Z91.19]  Not Applicable   • Anemia due to chronic kidney disease, on chronic dialysis (HCC) [N18.6, D63.1, Z99.2]  Not Applicable   • Unspecified diastolic (congestive) heart failure (HCC) [I50.30]  Yes   • MIKE and COPD overlap syndrome (HCC) [G47.33, J44.9]  Yes   • Hypothyroidism [E03.9]  Yes   • Coronary artery disease [I25.10]  Yes   • COPD (chronic obstructive pulmonary disease) (HCC) [J44.9]  Yes   • Hypertension [I10]  Yes       Resolved Hospital Problems    Diagnosis Date Resolved POA   • Diabetic acidosis (HCC) [E11.10] 01/10/2022 Yes   • SIRS (systemic inflammatory response syndrome) (HCC) [R65.10] 01/11/2022 Yes   • Altered mental status [R41.82] 01/11/2022 Yes   • CAD (coronary artery disease) [I25.10] 01/09/2022 Yes   • Acute cystitis with hematuria [N30.01] 01/15/2022 No   • Type 2 diabetes mellitus with hyperosmolar hyperglycemic state (HHS) (HCC) [E11.00, E11.65] 01/11/2022 Yes     PLAN:  Continue current management plan.  Awaiting SNF.  Noted concern for HD catheter- unclear if it would be ideal to plan to replace on Mon. Prior to next HD session and/or if Nephro will need to ensure that new catheter is functional prior to DC.          Signature    This document has been electronically signed by Radha Argueta MD on January 15, 2022 15:47 CST

## 2022-01-15 NOTE — PLAN OF CARE
Goal Outcome Evaluation:              Outcome Summary: Patient rested this evening with one incontinence episode, bladder scan obtained with 239 after incontinence. Patient tolerated home trilogy machine. Patient free from further injuries or falls at this time, VSS.

## 2022-01-15 NOTE — PROGRESS NOTES
FAMILY MEDICINE RESIDENCY SERVICE  DAILY PROGRESS NOTE    NAME: Yamileht Slater  : 1960  MRN: [id number]      LOS: 5 days     PROVIDER OF SERVICE: Luz Quijano MD    Chief Complaint: Fall    Subjective:     Interval History:  History taken from: patient chart    Patient was seen awake resting comfortably in bed this AM. There were no acute overnight events. Patient is AOx4. One episode of incontinence yesterday evening. She denies dysuria. VSS. She is a MWF dialysis patient and received dialysis yesterday. 2000mL output from dialysis on Friday. She states dialysis took longer than usual and nephrology team might consider placing a new catheter after dialysis on Monday. Denies chest pain, abdominal discomfort, soa.     Review of Systems:   Review of Systems   Constitutional: Negative.    Respiratory: Negative.    Cardiovascular: Negative.    Gastrointestinal: Negative.  Negative for nausea and vomiting.   Endocrine: Negative.    Musculoskeletal: Negative for arthralgias and myalgias.        Buttock discomfort    Skin: Negative.    Neurological: Negative.  Negative for headaches.   Psychiatric/Behavioral: Negative.  Negative for suicidal ideas.       Objective:     Vital Signs  Temp:  [96.3 °F (35.7 °C)-97.8 °F (36.6 °C)] 97.3 °F (36.3 °C)  Heart Rate:  [64-75] 67  Resp:  [16-20] 18  BP: (105-133)/(52-83) 119/67  Flow (L/min):  [2-3] 3   Body mass index is 45.96 kg/m².    Physical Exam  Physical Exam  Vitals and nursing note reviewed.   Constitutional:       Appearance: Normal appearance. She is normal weight.   HENT:      Head: Normocephalic and atraumatic.      Nose: Nose normal.      Mouth/Throat:      Mouth: Mucous membranes are moist.   Cardiovascular:      Rate and Rhythm: Normal rate and regular rhythm.      Pulses: Normal pulses.      Heart sounds: Normal heart sounds.   Pulmonary:      Effort: Pulmonary effort is normal.      Breath sounds: Normal breath sounds.   Abdominal:      General:  Abdomen is flat. Bowel sounds are normal.      Palpations: Abdomen is soft.      Tenderness: There is no abdominal tenderness.   Musculoskeletal:         General: Normal range of motion.      Cervical back: Normal range of motion.   Skin:     General: Skin is warm and dry.      Capillary Refill: Capillary refill takes less than 2 seconds.   Neurological:      General: No focal deficit present.      Mental Status: She is alert and oriented to person, place, and time.   Psychiatric:         Mood and Affect: Mood normal.         Behavior: Behavior normal.         Scheduled Meds   aspirin, 81 mg, Oral, Daily  atorvastatin, 20 mg, Oral, Daily  bumetanide, 2 mg, Oral, Once per day on Sun Tue Thu Sat  clopidogrel, 75 mg, Oral, Daily  gabapentin, 300 mg, Oral, Q8H  heparin (porcine), 5,000 Units, Subcutaneous, Q8H  insulin aspart, 0-24 Units, Subcutaneous, TID AC  insulin aspart, 30 Units, Subcutaneous, TID With Meals  insulin detemir, 30 Units, Subcutaneous, Q12H  ipratropium-albuterol, 3 mL, Nebulization, 4x Daily - RT  isosorbide mononitrate, 30 mg, Oral, QAM  levothyroxine, 25 mcg, Oral, Daily  lidocaine, 1 patch, Transdermal, Q24H  lisinopril, 5 mg, Oral, Daily  metoprolol tartrate, 25 mg, Oral, Q12H  midodrine, 5 mg, Oral, Once per day on Mon Wed Fri  montelukast, 10 mg, Oral, Nightly  nystatin, , Topical, Q12H  oxybutynin XL, 5 mg, Oral, Daily  pantoprazole, 40 mg, Oral, Q AM  ranolazine, 500 mg, Oral, Q12H  senna-docusate sodium, 2 tablet, Oral, BID  sodium chloride, 10 mL, Intravenous, Q12H       PRN Meds   •  acetaminophen **OR** acetaminophen **OR** acetaminophen  •  albuterol  •  senna-docusate sodium **AND** polyethylene glycol **AND** bisacodyl **AND** bisacodyl  •  dextrose  •  dextrose  •  dextrose  •  glucagon (human recombinant)  •  heparin (porcine)  •  hydrALAZINE  •  hydrocortisone-bacitracin-zinc oxide-nystatin  •  labetalol  •  muscle rub  •  ondansetron  •  sodium chloride  •  sodium chloride  •   sodium chloride  •  sodium chloride      Diagnostic Data    Results from last 7 days   Lab Units 01/15/22  0719 01/09/22  1618 01/09/22  1214   WBC 10*3/mm3 8.68   < > 17.78*   HEMOGLOBIN g/dL 7.7*   < > 7.0*   HEMATOCRIT % 24.6*   < > 22.1*   PLATELETS 10*3/mm3 225   < > 423   GLUCOSE mg/dL 179*   < > 800*   CREATININE mg/dL 7.23*   < > 3.46*   BUN mg/dL 41*   < > 44*   SODIUM mmol/L 130*   < > 129*   POTASSIUM mmol/L 4.7   < > 4.0   AST (SGOT) U/L  --   --  10   ALT (SGPT) U/L  --   --  7   ALK PHOS U/L  --   --  192*   BILIRUBIN mg/dL  --   --  0.2   ANION GAP mmol/L 14.0   < > 24.0*    < > = values in this interval not displayed.       CT Head Without Contrast    Result Date: 1/13/2022  1. Stable appearance of the brain without radiographic evidence of acute intracranial pathology. Electronically signed by:  Hilda Orellana MD  1/13/2022 1:53 PM CST Workstation: [id number]    XR Pelvis 1 or 2 View    Result Date: 1/13/2022  No gross displaced fracture evident on frontal radiographic image. If there is persistent clinical concern, would recommend repeat imaging or cross-sectional imaging for increased sensitivity. Electronically signed by:  Jm Mckeon MD  1/13/2022 6:41 PM CST Workstation: [id number]        I reviewed the patient's new clinical results.    Assessment/Plan:     Active Hospital Problems    Diagnosis  POA   • **Fall [W19.XXXA]  No     Priority: High     - witnessed fall without head trauma on 1/13  - No LOC; Patient states her legs gave out  - 1/9: CT head for AMS which has since resolved.    - 1/13 : Repeat CT of head - No acute intracranial pathology. Unchanged since admission CT    - 1/14: Patient AOx4 on exam, Lidocaine patch/ bengay for buttock discomfort   - 1/15 : patient AO x4   -1/13: Xray pelvis   -No gross displaced fracture evident      - MRI unable to be done due to body habitus          • Physical deconditioning [R53.81]  Yes     Priority: High     - PT/OT   - SNF placement pending       • Type 2 diabetes mellitus with kidney complication, with long-term current use of insulin (HCC) [E11.29, Z79.4]  Not Applicable     Priority: High     - Levemir 30units Q12  - Novolog 30 with meals  - SSI  -HgA1c 10/2021 : 8.80       • Personal history of noncompliance with medical treatment, presenting hazards to health [Z91.19]  Not Applicable     Priority: Medium     · Difficulty showing up to clinic visits due to complexity of medical problems and transportation  · Would really benefit from a couple home visits from PCP to help improve compliance       • Anemia due to chronic kidney disease, on chronic dialysis (HCC) [N18.6, D63.1, Z99.2]  Not Applicable     Priority: Medium     - MWF Dialysis patient  - Epoetin (Retacrit) 10,000 units three times a week on dialysis days MWF  - Hg 7 on admission. Transfuse if Hg less than 7   - 1/14: Hg 8   - 1/15: Hg 7.7   - Daily CBC   - Type and screen done   -1/15 : Tunnel Catheter possibly need to be replaced after next HD session on Monday due to poor catheter flow vs loss of function. Will coordinate with nephrology if catheter can be replaced sooner as patient is awaiting SNF placement.            • Unspecified diastolic (congestive) heart failure (HCC) [I50.30]  Yes     Priority: Low     - Continue Imdur, metoprolol, lisinopril, bumex  - pro-BNP 11939 on admission  - Echo 1/10/2022 : EF 61-65%  -Cardiology on board (Dr. Handy) - appreciate recommendations  - MWF Dialysis patient     • MIKE and COPD overlap syndrome (HCC) [G47.33, J44.9]  Yes     Priority: Low     · Home Trilogy/CPAP  · Home 3L oxygen dependent. Maintain strict Sats between 88-92%      • Hypothyroidism [E03.9]  Yes     Priority: Low     Continue home Levothyroxine 25mcg, stable     • Coronary artery disease [I25.10]  Yes     Priority: Low     · S/P CABG x 3 (Primary), Bilateral carotid artery stenosis w/ 2 stents on left side,  PAD (peripheral artery disease) with stent in right upper leg  · Continue    - aspirin 81mg   - Plavix 75mg daily  - atorvastatin 20 mg daily  - lisinopril 5mg daily  -lopressor 25mg BID  -Imdur 30mg daily  - Ranexa - 500mg BID   · EKG - sinus tachycardia             • COPD (chronic obstructive pulmonary disease) (HCC) [J44.9]  Yes     Priority: Low     - Dependent on 3L NC at Home  - Maintain strict sats between 88-92%  -Singulair 10mg   -DuoNebs  -NIPPV via Respiratory. Patient uses Trilogy at home/night      • Hypertension [I10]  Yes     Priority: Low       · Metoprolol 25mg bid, hold if HR < 60bpm  · Lisinopril 5mg daily  · Telemetry  · Hydralazine 10mg prn q6h for SBP > 170  · Labetalol 20mg Q6h >160           DVT prophylaxis: Heparin  Code status is   Code Status and Medical Interventions:   Ordered at: 01/09/22 1508     Level Of Support Discussed With:    Next of Kin (If No Surrogate)     Code Status (Patient has no pulse and is not breathing):    CPR (Attempt to Resuscitate)     Medical Interventions (Patient has pulse or is breathing):    Full Support     Comments:    Discussed with  Huy Slater at bedside       Plan for disposition:Where: SNF and When:  1-2days pending SNF placement       Time: 15 mins         This document has been electronically signed by Luz Quijano MD on January 15, 2022 12:11 CST

## 2022-01-15 NOTE — SIGNIFICANT NOTE
Family Medicine Residency  Haily Mcneil MD            Got a call from the lab of a positive blood culture in 1 out of 1 bottles for gram positive bacilli, likely a contaminant.       This document has been electronically signed by Haily Mcneil MD on January 15, 2022 04:50 CST

## 2022-01-16 LAB
ANION GAP SERPL CALCULATED.3IONS-SCNC: 14 MMOL/L (ref 5–15)
BACTERIA SPEC AEROBE CULT: ABNORMAL
BUN SERPL-MCNC: 49 MG/DL (ref 8–23)
BUN/CREAT SERPL: 6.3 (ref 7–25)
CALCIUM SPEC-SCNC: 8.5 MG/DL (ref 8.6–10.5)
CHLORIDE SERPL-SCNC: 91 MMOL/L (ref 98–107)
CO2 SERPL-SCNC: 22 MMOL/L (ref 22–29)
CREAT SERPL-MCNC: 7.77 MG/DL (ref 0.57–1)
DEPRECATED RDW RBC AUTO: 46.5 FL (ref 37–54)
ERYTHROCYTE [DISTWIDTH] IN BLOOD BY AUTOMATED COUNT: 14.5 % (ref 12.3–15.4)
GFR SERPL CREATININE-BSD FRML MDRD: 5 ML/MIN/1.73
GFR SERPL CREATININE-BSD FRML MDRD: ABNORMAL ML/MIN/{1.73_M2}
GLUCOSE BLDC GLUCOMTR-MCNC: 127 MG/DL (ref 70–130)
GLUCOSE BLDC GLUCOMTR-MCNC: 247 MG/DL (ref 70–130)
GLUCOSE BLDC GLUCOMTR-MCNC: 285 MG/DL (ref 70–130)
GLUCOSE BLDC GLUCOMTR-MCNC: 89 MG/DL (ref 70–130)
GLUCOSE SERPL-MCNC: 284 MG/DL (ref 65–99)
GRAM STN SPEC: ABNORMAL
HCT VFR BLD AUTO: 23.5 % (ref 34–46.6)
HGB BLD-MCNC: 7.6 G/DL (ref 12–15.9)
ISOLATED FROM: ABNORMAL
MCH RBC QN AUTO: 28.4 PG (ref 26.6–33)
MCHC RBC AUTO-ENTMCNC: 32.3 G/DL (ref 31.5–35.7)
MCV RBC AUTO: 87.7 FL (ref 79–97)
PLATELET # BLD AUTO: 266 10*3/MM3 (ref 140–450)
PMV BLD AUTO: 9.2 FL (ref 6–12)
POTASSIUM SERPL-SCNC: 4.6 MMOL/L (ref 3.5–5.2)
RBC # BLD AUTO: 2.68 10*6/MM3 (ref 3.77–5.28)
SODIUM SERPL-SCNC: 127 MMOL/L (ref 136–145)
WBC NRBC COR # BLD: 6.84 10*3/MM3 (ref 3.4–10.8)

## 2022-01-16 PROCEDURE — 94760 N-INVAS EAR/PLS OXIMETRY 1: CPT

## 2022-01-16 PROCEDURE — 63710000001 INSULIN DETEMIR PER 5 UNITS: Performed by: STUDENT IN AN ORGANIZED HEALTH CARE EDUCATION/TRAINING PROGRAM

## 2022-01-16 PROCEDURE — 63710000001 INSULIN ASPART PER 5 UNITS: Performed by: STUDENT IN AN ORGANIZED HEALTH CARE EDUCATION/TRAINING PROGRAM

## 2022-01-16 PROCEDURE — 94664 DEMO&/EVAL PT USE INHALER: CPT

## 2022-01-16 PROCEDURE — 25010000002 HEPARIN (PORCINE) PER 1000 UNITS: Performed by: STUDENT IN AN ORGANIZED HEALTH CARE EDUCATION/TRAINING PROGRAM

## 2022-01-16 PROCEDURE — 94799 UNLISTED PULMONARY SVC/PX: CPT

## 2022-01-16 PROCEDURE — 97110 THERAPEUTIC EXERCISES: CPT

## 2022-01-16 PROCEDURE — 99231 SBSQ HOSP IP/OBS SF/LOW 25: CPT | Performed by: STUDENT IN AN ORGANIZED HEALTH CARE EDUCATION/TRAINING PROGRAM

## 2022-01-16 PROCEDURE — 25010000002 ONDANSETRON PER 1 MG: Performed by: STUDENT IN AN ORGANIZED HEALTH CARE EDUCATION/TRAINING PROGRAM

## 2022-01-16 PROCEDURE — 97535 SELF CARE MNGMENT TRAINING: CPT

## 2022-01-16 PROCEDURE — 80048 BASIC METABOLIC PNL TOTAL CA: CPT | Performed by: STUDENT IN AN ORGANIZED HEALTH CARE EDUCATION/TRAINING PROGRAM

## 2022-01-16 PROCEDURE — 82962 GLUCOSE BLOOD TEST: CPT

## 2022-01-16 PROCEDURE — 97116 GAIT TRAINING THERAPY: CPT

## 2022-01-16 PROCEDURE — 85027 COMPLETE CBC AUTOMATED: CPT | Performed by: STUDENT IN AN ORGANIZED HEALTH CARE EDUCATION/TRAINING PROGRAM

## 2022-01-16 RX ADMIN — ACETAMINOPHEN 650 MG: 325 TABLET, FILM COATED ORAL at 16:22

## 2022-01-16 RX ADMIN — ISOSORBIDE MONONITRATE 30 MG: 30 TABLET, EXTENDED RELEASE ORAL at 06:26

## 2022-01-16 RX ADMIN — METOPROLOL TARTRATE 25 MG: 25 TABLET, FILM COATED ORAL at 21:02

## 2022-01-16 RX ADMIN — LISINOPRIL 5 MG: 5 TABLET ORAL at 08:33

## 2022-01-16 RX ADMIN — HEPARIN SODIUM 5000 UNITS: 5000 INJECTION INTRAVENOUS; SUBCUTANEOUS at 21:01

## 2022-01-16 RX ADMIN — HEPARIN SODIUM 5000 UNITS: 5000 INJECTION INTRAVENOUS; SUBCUTANEOUS at 13:39

## 2022-01-16 RX ADMIN — NYSTATIN: 100000 POWDER TOPICAL at 08:39

## 2022-01-16 RX ADMIN — GABAPENTIN 300 MG: 300 CAPSULE ORAL at 13:39

## 2022-01-16 RX ADMIN — LIDOCAINE 1 PATCH: 50 PATCH CUTANEOUS at 08:37

## 2022-01-16 RX ADMIN — SODIUM CHLORIDE, PRESERVATIVE FREE 10 ML: 5 INJECTION INTRAVENOUS at 08:38

## 2022-01-16 RX ADMIN — INSULIN ASPART 30 UNITS: 100 INJECTION, SOLUTION INTRAVENOUS; SUBCUTANEOUS at 17:04

## 2022-01-16 RX ADMIN — NYSTATIN: 100000 POWDER TOPICAL at 21:21

## 2022-01-16 RX ADMIN — SODIUM CHLORIDE, PRESERVATIVE FREE 10 ML: 5 INJECTION INTRAVENOUS at 21:20

## 2022-01-16 RX ADMIN — HEPARIN SODIUM 5000 UNITS: 5000 INJECTION INTRAVENOUS; SUBCUTANEOUS at 05:26

## 2022-01-16 RX ADMIN — ACETAMINOPHEN 650 MG: 325 TABLET, FILM COATED ORAL at 02:43

## 2022-01-16 RX ADMIN — IPRATROPIUM BROMIDE AND ALBUTEROL SULFATE 3 ML: 2.5; .5 SOLUTION RESPIRATORY (INHALATION) at 07:52

## 2022-01-16 RX ADMIN — IPRATROPIUM BROMIDE AND ALBUTEROL SULFATE 3 ML: 2.5; .5 SOLUTION RESPIRATORY (INHALATION) at 19:58

## 2022-01-16 RX ADMIN — ONDANSETRON 4 MG: 2 INJECTION INTRAMUSCULAR; INTRAVENOUS at 13:41

## 2022-01-16 RX ADMIN — BUMETANIDE 2 MG: 1 TABLET ORAL at 08:33

## 2022-01-16 RX ADMIN — INSULIN ASPART 30 UNITS: 100 INJECTION, SOLUTION INTRAVENOUS; SUBCUTANEOUS at 08:36

## 2022-01-16 RX ADMIN — RANOLAZINE 500 MG: 500 TABLET, FILM COATED, EXTENDED RELEASE ORAL at 08:33

## 2022-01-16 RX ADMIN — INSULIN ASPART 12 UNITS: 100 INJECTION, SOLUTION INTRAVENOUS; SUBCUTANEOUS at 08:36

## 2022-01-16 RX ADMIN — INSULIN DETEMIR 30 UNITS: 100 INJECTION, SOLUTION SUBCUTANEOUS at 08:34

## 2022-01-16 RX ADMIN — ASPIRIN 81 MG: 81 TABLET, FILM COATED ORAL at 08:33

## 2022-01-16 RX ADMIN — CLOPIDOGREL 75 MG: 75 TABLET, FILM COATED ORAL at 08:33

## 2022-01-16 RX ADMIN — INSULIN ASPART 30 UNITS: 100 INJECTION, SOLUTION INTRAVENOUS; SUBCUTANEOUS at 11:25

## 2022-01-16 RX ADMIN — INSULIN DETEMIR 30 UNITS: 100 INJECTION, SOLUTION SUBCUTANEOUS at 21:28

## 2022-01-16 RX ADMIN — MONTELUKAST 10 MG: 10 TABLET, FILM COATED ORAL at 21:02

## 2022-01-16 RX ADMIN — OXYBUTYNIN CHLORIDE 5 MG: 5 TABLET, EXTENDED RELEASE ORAL at 08:33

## 2022-01-16 RX ADMIN — IPRATROPIUM BROMIDE AND ALBUTEROL SULFATE 3 ML: 2.5; .5 SOLUTION RESPIRATORY (INHALATION) at 15:12

## 2022-01-16 RX ADMIN — METOPROLOL TARTRATE 25 MG: 25 TABLET, FILM COATED ORAL at 08:33

## 2022-01-16 RX ADMIN — MENTHOL, METHYL SALICYLATE 1 APPLICATION: 10; 15 CREAM TOPICAL at 05:25

## 2022-01-16 RX ADMIN — GABAPENTIN 300 MG: 300 CAPSULE ORAL at 21:02

## 2022-01-16 RX ADMIN — RANOLAZINE 500 MG: 500 TABLET, FILM COATED, EXTENDED RELEASE ORAL at 21:02

## 2022-01-16 RX ADMIN — INSULIN ASPART 12 UNITS: 100 INJECTION, SOLUTION INTRAVENOUS; SUBCUTANEOUS at 11:26

## 2022-01-16 RX ADMIN — GABAPENTIN 300 MG: 300 CAPSULE ORAL at 05:26

## 2022-01-16 RX ADMIN — LEVOTHYROXINE SODIUM 25 MCG: 25 TABLET ORAL at 08:33

## 2022-01-16 RX ADMIN — ATORVASTATIN CALCIUM 20 MG: 20 TABLET, FILM COATED ORAL at 08:33

## 2022-01-16 RX ADMIN — PANTOPRAZOLE SODIUM 40 MG: 40 TABLET, DELAYED RELEASE ORAL at 05:26

## 2022-01-16 NOTE — PROGRESS NOTES
FAMILY MEDICINE RESIDENCY SERVICE  DAILY PROGRESS NOTE    NAME: Yamileth Slater  : 1960  MRN: [id number]      LOS: 6 days     PROVIDER OF SERVICE: Luz Quijano MD    Chief Complaint: Fall    Subjective:     Interval History:  History taken from: patient chart    Patient was seen awake resting comfortably this morning. She is AO x4. Per patient, she got up yesterday evening and walked herself to the bathroom. As she stood in front of the toilet seat she helped herslf to the toilet. She called her nurse to help her back to her bed. Explained to patient she must use her bed alarm if she needs to get up from bed to ensure her safety. She is agreeable at this time. States bengay has helped her buttock pain. Awaiting SNF placement. Dialysis tomorrow.      Review of Systems:   Review of Systems   Constitutional: Negative.    Respiratory: Negative.    Cardiovascular: Negative.    Gastrointestinal: Negative.    Endocrine: Negative.    Musculoskeletal: Negative for arthralgias and myalgias.   Skin: Negative.    Neurological: Negative.  Negative for headaches.   Psychiatric/Behavioral: Negative.  Negative for suicidal ideas.       Objective:     Vital Signs  Temp:  [96.2 °F (35.7 °C)-97.9 °F (36.6 °C)] 97.9 °F (36.6 °C)  Heart Rate:  [65-68] 66  Resp:  [18-20] 18  BP: (115-151)/(58-68) 122/59  Flow (L/min):  [2-3] 2   Body mass index is 46.44 kg/m².    Physical Exam  Physical Exam  Constitutional:       Appearance: Normal appearance. She is normal weight.   HENT:      Head: Normocephalic and atraumatic.      Nose: Nose normal.      Mouth/Throat:      Mouth: Mucous membranes are moist.   Cardiovascular:      Rate and Rhythm: Normal rate and regular rhythm.      Pulses: Normal pulses.      Heart sounds: Normal heart sounds.   Pulmonary:      Effort: Pulmonary effort is normal.      Breath sounds: Normal breath sounds.   Abdominal:      General: Bowel sounds are normal.      Palpations: Abdomen is soft.    Musculoskeletal:         General: Normal range of motion.      Cervical back: Normal range of motion.   Skin:     General: Skin is warm and dry.      Capillary Refill: Capillary refill takes less than 2 seconds.   Neurological:      General: No focal deficit present.      Mental Status: She is alert and oriented to person, place, and time.   Psychiatric:         Mood and Affect: Mood normal.         Behavior: Behavior normal.         Scheduled Meds   aspirin, 81 mg, Oral, Daily  atorvastatin, 20 mg, Oral, Daily  bumetanide, 2 mg, Oral, Once per day on Sun Tue Thu Sat  clopidogrel, 75 mg, Oral, Daily  gabapentin, 300 mg, Oral, Q8H  heparin (porcine), 5,000 Units, Subcutaneous, Q8H  insulin aspart, 0-24 Units, Subcutaneous, TID AC  insulin aspart, 30 Units, Subcutaneous, TID With Meals  insulin detemir, 30 Units, Subcutaneous, Q12H  ipratropium-albuterol, 3 mL, Nebulization, 4x Daily - RT  isosorbide mononitrate, 30 mg, Oral, QAM  levothyroxine, 25 mcg, Oral, Daily  lidocaine, 1 patch, Transdermal, Q24H  lisinopril, 5 mg, Oral, Daily  metoprolol tartrate, 25 mg, Oral, Q12H  midodrine, 5 mg, Oral, Once per day on Mon Wed Fri  montelukast, 10 mg, Oral, Nightly  nystatin, , Topical, Q12H  oxybutynin XL, 5 mg, Oral, Daily  pantoprazole, 40 mg, Oral, Q AM  ranolazine, 500 mg, Oral, Q12H  senna-docusate sodium, 2 tablet, Oral, BID  sodium chloride, 10 mL, Intravenous, Q12H       PRN Meds   •  acetaminophen **OR** acetaminophen **OR** acetaminophen  •  albuterol  •  senna-docusate sodium **AND** polyethylene glycol **AND** bisacodyl **AND** bisacodyl  •  dextrose  •  dextrose  •  dextrose  •  glucagon (human recombinant)  •  heparin (porcine)  •  hydrALAZINE  •  hydrocortisone-bacitracin-zinc oxide-nystatin  •  labetalol  •  muscle rub  •  ondansetron  •  sodium chloride  •  sodium chloride  •  sodium chloride  •  sodium chloride      Diagnostic Data    Results from last 7 days   Lab Units 01/16/22  0701   WBC 10*3/mm3  6.84   HEMOGLOBIN g/dL 7.6*   HEMATOCRIT % 23.5*   PLATELETS 10*3/mm3 266   GLUCOSE mg/dL 284*   CREATININE mg/dL 7.77*   BUN mg/dL 49*   SODIUM mmol/L 127*   POTASSIUM mmol/L 4.6   ANION GAP mmol/L 14.0       No radiology results for the last day      I reviewed the patient's new clinical results.    Assessment/Plan:     Active Hospital Problems    Diagnosis  POA   • **Fall [W19.XXXA]  No     Priority: High     - witnessed fall without head trauma on 1/13  - No LOC; Patient states her legs gave out  - 1/9: CT head for AMS which has since resolved.    - 1/13 : Repeat CT of head - No acute intracranial pathology. Unchanged since admission CT    - 1/14: Patient AOx4 on exam, Lidocaine patch/ bengay for buttock discomfort   - 1/15 : patient AO x4   -1/13: Xray pelvis   -No gross displaced fracture evident      - MRI unable to be done due to body habitus          • Physical deconditioning [R53.81]  Yes     Priority: High     - PT/OT   - SNF placement pending      • Type 2 diabetes mellitus with kidney complication, with long-term current use of insulin (HCC) [E11.29, Z79.4]  Not Applicable     Priority: High     - Levemir 30units Q12  - Novolog 30 with meals  - SSI  -HgA1c 10/2021 : 8.80       • Personal history of noncompliance with medical treatment, presenting hazards to health [Z91.19]  Not Applicable     Priority: Medium     · Difficulty showing up to clinic visits due to complexity of medical problems and transportation  · Would really benefit from a couple home visits from PCP to help improve compliance       • Anemia due to chronic kidney disease, on chronic dialysis (HCC) [N18.6, D63.1, Z99.2]  Not Applicable     Priority: Medium     - MWF Dialysis patient  - Epoetin (Retacrit) 10,000 units three times a week on dialysis days MWF  - Hg 7 on admission. Transfuse if Hg less than 7   - 1/14: Hg 8   - 1/15: Hg 7.7   - Daily CBC   - Type and screen done   -1/15 : Tunnel Catheter possibly need to be replaced after  next HD session on Monday due to poor catheter flow vs loss of function. Will coordinate with nephrology if catheter can be replaced sooner as patient is awaiting SNF placement.            • Unspecified diastolic (congestive) heart failure (HCC) [I50.30]  Yes     Priority: Low     - Continue Imdur, metoprolol, lisinopril, bumex  - pro-BNP 22191 on admission  - Echo 1/10/2022 : EF 61-65%  -Cardiology on board (Dr. Handy) - appreciate recommendations  - MWF Dialysis patient     • MIKE and COPD overlap syndrome ([facility]) [G47.33, J44.9]  Yes     Priority: Low     · Home Trilogy/CPAP  · Home 3L oxygen dependent. Maintain strict Sats between 88-92%      • Hypothyroidism [E03.9]  Yes     Priority: Low     Continue home Levothyroxine 25mcg, stable     • Coronary artery disease [I25.10]  Yes     Priority: Low     · S/P CABG x 3 (Primary), Bilateral carotid artery stenosis w/ 2 stents on left side,  PAD (peripheral artery disease) with stent in right upper leg  · Continue   - aspirin 81mg   - Plavix 75mg daily  - atorvastatin 20 mg daily  - lisinopril 5mg daily  -lopressor 25mg BID  -Imdur 30mg daily  - Ranexa - 500mg BID   · EKG - sinus tachycardia             • COPD (chronic obstructive pulmonary disease) ([facility]) [J44.9]  Yes     Priority: Low     - Dependent on 3L NC at Home  - Maintain strict sats between 88-92%  -Singulair 10mg   -DuoNebs  -NIPPV via Respiratory. Patient uses Trilogy at home/night      • Hypertension [I10]  Yes     Priority: Low       · Metoprolol 25mg bid, hold if HR < 60bpm  · Lisinopril 5mg daily  · Telemetry  · Hydralazine 10mg prn q6h for SBP > 170  · Labetalol 20mg Q6h >160           DVT prophylaxis: Heparin  Code status is   Code Status and Medical Interventions:   Ordered at: 01/09/22 150     Level Of Support Discussed With:    Next of Kin (If No Surrogate)     Code Status (Patient has no pulse and is not breathing):    CPR (Attempt to Resuscitate)     Medical Interventions (Patient has pulse or is  breathing):    Full Support     Comments:    Discussed with  Huy Slater at bedside       Plan for disposition:Where: SNF and When:  tomorrow pending placement       Time: 20 mins         This document has been electronically signed by Luz Quijano MD on January 16, 2022 12:32 CST

## 2022-01-16 NOTE — PROGRESS NOTES
"[facility]  [address] [place] [address]. [address]  T - [phone]  F - [phone]     Progress Note          PATIENT  DEMOGRAPHICS   PATIENT NAME: Yamileth Slater                      PHYSICIAN: BRIDGETT Hadley  : 1960  MRN: [id number]   LOS: 6 days    Patient Care Team:  Rianna Macias MD as PCP - General (Family Medicine)  Subjective   SUBJECTIVE   No soa         Objective   OBJECTIVE   Vital Signs  Temp:  [96.2 °F (35.7 °C)-97.9 °F (36.6 °C)] 97.9 °F (36.6 °C)  Heart Rate:  [65-68] 66  Resp:  [18-20] 18  BP: (115-151)/(58-68) 122/59    Flowsheet Rows      First Filed Value   Admission Height 167.6 cm (66\") Documented at 2022 1304   Admission Weight 132 kg (291 lb 9.6 oz) Documented at 2022 1048           I/O last 3 completed shifts:  In: 960 [P.O.:960]  Out: 300 [Urine:300]    PHYSICAL EXAM    Physical Exam  Constitutional:       Appearance: She is well-developed.   HENT:      Head: Normocephalic.   Eyes:      Pupils: Pupils are equal, round, and reactive to light.   Cardiovascular:      Rate and Rhythm: Normal rate and regular rhythm.      Heart sounds: Normal heart sounds.   Pulmonary:      Effort: Pulmonary effort is normal.      Breath sounds: Normal breath sounds.   Abdominal:      General: Bowel sounds are normal.      Palpations: Abdomen is soft.   Musculoskeletal:         General: No swelling.   Skin:     Coloration: Skin is not jaundiced.   Neurological:      Mental Status: She is alert and oriented to person, place, and time.         RESULTS   Results Review:    Results from last 7 days   Lab Units 22  0701 01/15/22  0719 22  0621   SODIUM mmol/L 127* 130* 126*   POTASSIUM mmol/L 4.6 4.7 3.8   CHLORIDE mmol/L 91* 93* 91*   CO2 mmol/L 22.0 23.0 17.0*   BUN mg/dL 49* 41* 49*   CREATININE mg/dL 7.77* 7.23* 7.79*   CALCIUM mg/dL 8.5* 8.0* 7.9*   GLUCOSE mg/dL 284* 179* 232*       Estimated Creatinine Clearance: 10.4 mL/min (A) (by " C-G formula based on SCr of 7.77 mg/dL (H)).    Results from last 7 days   Lab Units 01/13/22  0621 01/12/22  1950 01/12/22  1634   PHOSPHORUS mg/dL 6.0* 4.1 4.0             Results from last 7 days   Lab Units 01/16/22  0701 01/15/22  0719 01/14/22  0621 01/13/22  0621 01/12/22  0432   WBC 10*3/mm3 6.84 8.68 7.66 9.06 9.07   HEMOGLOBIN g/dL 7.6* 7.7* 8.0* 8.5* 8.6*   PLATELETS 10*3/mm3 266 225 204 220 202               Imaging Results (Last 24 Hours)     ** No results found for the last 24 hours. **           MEDICATIONS    aspirin, 81 mg, Oral, Daily  atorvastatin, 20 mg, Oral, Daily  bumetanide, 2 mg, Oral, Once per day on Sun Tue Thu Sat  clopidogrel, 75 mg, Oral, Daily  gabapentin, 300 mg, Oral, Q8H  heparin (porcine), 5,000 Units, Subcutaneous, Q8H  insulin aspart, 0-24 Units, Subcutaneous, TID AC  insulin aspart, 30 Units, Subcutaneous, TID With Meals  insulin detemir, 30 Units, Subcutaneous, Q12H  ipratropium-albuterol, 3 mL, Nebulization, 4x Daily - RT  isosorbide mononitrate, 30 mg, Oral, QAM  levothyroxine, 25 mcg, Oral, Daily  lidocaine, 1 patch, Transdermal, Q24H  lisinopril, 5 mg, Oral, Daily  metoprolol tartrate, 25 mg, Oral, Q12H  midodrine, 5 mg, Oral, Once per day on Mon Wed Fri  montelukast, 10 mg, Oral, Nightly  nystatin, , Topical, Q12H  oxybutynin XL, 5 mg, Oral, Daily  pantoprazole, 40 mg, Oral, Q AM  ranolazine, 500 mg, Oral, Q12H  senna-docusate sodium, 2 tablet, Oral, BID  sodium chloride, 10 mL, Intravenous, Q12H      O2, 3 L/min, Last Rate: 3 L/min (01/09/22 1809)  sodium chloride, 10 mL/hr        Assessment/Plan   ASSESSMENT / PLAN      Fall    Hypertension    COPD (chronic obstructive pulmonary disease) (HCC)    Coronary artery disease    Hypothyroidism    MIKE and COPD overlap syndrome (HCC)    Unspecified diastolic (congestive) heart failure (HCC)    Anemia due to chronic kidney disease, on chronic dialysis (HCC)    Personal history of noncompliance with medical treatment, presenting  hazards to health    Type 2 diabetes mellitus with kidney complication, with long-term current use of insulin (HCC)    Physical deconditioning    1.  ESRD on HD- Initiated HD on 10/21 due to hyperkalemia and fluid overload, now  ESRD.  HD MWF for now.  Keep bumex 2mg on non dialysis days. Euvolemic at present. Cr much higher this admission pre dialysis than it used to be ?due to poor catheter flow vs loss of function.    -keep midodrine pre dialysis.  If catheter not working next week then needs line exchange, will evaluate after HD Monday     2.  Hyponatremia- in the setting of severe hyperglycemia, hyperosmolar hyponatremia. Now better with glucose correction     3.  HHS-started on insulin drip, managed per primary team. Off insulin drip. On aspart and levemir     4.  History of CAD     5.  History of COPD      6.  Anemia / previous GI bleed / b12 deficiency-  s/p capsule endoscopy which showed few small non-bleeding intestinal AVMs and single small polyp. Hgb slowly dropping.     7.  HTN- Continue home antihypertensives. Metoprolol lowered to 25mg    dispo awaiting snf approval           This document has been electronically signed by BRIDGETT Hadley on January 16, 2022 13:43 CST

## 2022-01-16 NOTE — THERAPY TREATMENT NOTE
Acute Care - Physical Therapy Treatment Note  [facility]     Patient Name: Yamileth Slater  : 1960  MRN: [id number]  Today's Date: 2022      Visit Dx:     ICD-10-CM ICD-9-CM   1. Diabetic ketoacidosis with coma associated with other specified diabetes mellitus ([facility])  E13.11 250.32   2. Hypertension, unspecified type  I10 401.9   3. Altered mental status, unspecified altered mental status type  R41.82 780.97   4. Dysphagia, unspecified type  R13.10 787.20   5. Impaired functional mobility, balance, gait, and endurance  Z74.09 V49.89   6. Impaired mobility and ADLs  Z74.09 V49.89    Z78.9      Patient Active Problem List   Diagnosis   • Chronic obstructive pulmonary disease (HCC)   • Chronic midline low back pain without sciatica   • Vitamin D deficiency   • Tobacco dependence syndrome   • Surgical follow-up care   • Shoulder joint pain   • Peripheral vascular disease ([facility])   • Pain   • Neurologic disorder associated with diabetes mellitus ([facility])   • Morbid obesity with BMI of 50.0-59.9, adult ([facility])   • Mixed hyperlipidemia   • Kidney stone   • History of colon polyps   • GERD (gastroesophageal reflux disease)   • Excoriated eczema   • Hypertension   • Encounter for medication refill   • Dyslipidemia   • Diabetes mellitus ([facility])   • COPD (chronic obstructive pulmonary disease) ([facility])   • Chronic folliculitis   • Coronary artery disease   • Mild persistent asthma   • 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   • Fall   • Hypothyroidism   • Carotid artery disease ([facility])   • Current smoker   • [place] abuse   • PAD (peripheral artery disease) ([facility])   • Intercostal pain   • Venous insufficiency   • LAFB (left anterior fascicular block)   • Pulmonary hypertension ([facility])   • Left hip pain   • Neck pain   • CKD (chronic kidney disease), symptom management only, stage 5 ([facility])   • MIKE and COPD overlap syndrome ([facility])   • Pneumonia due to COVID-19 virus   • Hyperkalemia   • Cutaneous candidiasis    • Community acquired pneumonia of right middle lobe of lung   • MRSA infection   • Alkaline phosphatase elevation   • COVID-19 ruled out by laboratory testing   • Esophageal dysphagia   • Monilial esophagitis ([facility])   • NSTEMI, initial episode of care ([facility])   • Pneumonia due to infectious organism   • Cellulitis of left leg   • Unspecified diastolic (congestive) heart failure ([facility])   • Hyponatremia   • Urinary tract infection due to Proteus   • History of insertion of tunneled central venous catheter (CVC) with port   • Anemia due to chronic kidney disease, on chronic dialysis ([facility])   • Pneumonitis   • Personal history of noncompliance with medical treatment, presenting hazards to health   • Type 2 diabetes mellitus with kidney complication, with long-term current use of insulin ([facility])   • Physical deconditioning     Past Medical History:   Diagnosis Date   • Acute blood loss anemia 4/16/2017    Likely due to gastric oozing at this time. - Dr. Duarte (GI) was consulted and has now signed off, will follow up outpatient - pill colonoscopy showed AVMs - continue to monitor   • Acute cystitis with hematuria 3/31/2021    1/13: IV Rocephin 1 gm q 24 1/14 : transitioned  to omnicef 300mg. Urine cultures resulted and did not show growth. Omnicef discontinued as patient is asymptomatic   • Altered mental status 1/9/2022    - AMS on presentation - initial ABG pH 7.3, CO2 34 - Procal 0.29 - UA negative for acute cysitits -CTA head wnl  - Empiric Zosyn and Vancomycin -Lactate 2.5 on admission  - blood cultures no growth at 24 hours     • Anxiety    • CAD (coronary artery disease) 4/24/2021    S/P 3 stents 5/1/2021 for [facility] Continue ASA 81mg & Clopidogrel 75mg Continue Atorvastatin 40mg   • Carotid artery stenosis    • Chronic obstructive lung disease ([facility])    • CKD (chronic kidney disease) stage 4, GFR 15-29 ml/min ([facility])    • Colonic polyp    • Coronary arteriosclerosis    • Diabetes mellitus ([facility])    • Diabetic neuropathy ([facility])     • Ear pain, right 10/18/2021    - canal trauma due to patient scratching and DMT2 - added cortisporin ear drops   • Elevated troponin 10/12/2021    -most likely from CKD -Trending down -Neg chest pain   • GERD (gastroesophageal reflux disease)    • GI bleed 5/13/2021    - GI will follow up outpatient - Protonix 40mg daily - Avoid medical DVT prophy and use mechanical at this time instead. - Continue to monitor - pill colonoscopy results showed AVMs   • History of transfusion    • Hypercholesterolemia    • Hypertension    • Hypomagnesemia 6/27/2021    Monitor and replace   • Morbid obesity (HCC)    • Nephrolithiasis    • Peripheral vascular disease ([facility])    • SIRS (systemic inflammatory response syndrome) ([facility]) 1/9/2022    Admission  - WBC 17.78   -   - RR 16 - 1/10: VSS/wnl - CXR - Mild pulm edema - Blood cultures no growth at 24 hours  - Procalcitonin 0.29 - UA : glucose 1000, negative Leucocytes/nitrate - Empiric Zosyn and Vancomcyin    • Sleep apnea    • Substance abuse ([facility])    • Vitamin D deficiency      Past Surgical History:   Procedure Laterality Date   • CARDIAC CATHETERIZATION N/A 7/14/2020   • CARDIAC CATHETERIZATION N/A 4/23/2021    Procedure: Left Heart Cath;  Surgeon: Melba Romo MD;  Location: [facility] CATH INVASIVE LOCATION;  Service: Cardiology;  Laterality: N/A;   • CARDIAC CATHETERIZATION N/A 4/30/2021    Procedure: Percutaneous Coronary Intervention;  Surgeon: Russell Voss MD;  Location: [facility] CATH INVASIVE LOCATION;  Service: Cardiovascular;  Laterality: N/A;   • CARDIAC CATHETERIZATION N/A 4/30/2021    Procedure: Stent NIKKI coronary;  Surgeon: Russell Voss MD;  Location: [facility] CATH INVASIVE LOCATION;  Service: Cardiovascular;  Laterality: N/A;   • CARDIAC CATHETERIZATION Left 11/13/2021    Procedure: Left Heart Cath;  Surgeon: Niall Rios MD;  Location: [facility] CATH INVASIVE LOCATION;  Service: Cardiology;  Laterality: Left;   • CAROTID STENT Left    •  COLONOSCOPY     • COLONOSCOPY N/A 5/14/2021    Procedure: COLONOSCOPY;  Surgeon: Mingo Duarte MD;  Location: [facility] ENDOSCOPY;  Service: Gastroenterology;  Laterality: N/A;   • CORONARY ARTERY BYPASS GRAFT N/A 2013    CABG X 3   • CYSTOSCOPY BLADDER STONE LITHOTRIPSY Bilateral    • ENDOSCOPY N/A 4/12/2021    Procedure: ESOPHAGOGASTRODUODENOSCOPY;  Surgeon: Mingo Duarte MD;  Location: [facility] ENDOSCOPY;  Service: Gastroenterology;  Laterality: N/A;   • ENDOSCOPY N/A 5/14/2021    Procedure: ESOPHAGOGASTRODUODENOSCOPY;  Surgeon: Mingo Duarte MD;  Location: [facility] ENDOSCOPY;  Service: Gastroenterology;  Laterality: N/A;   • INTERVENTIONAL RADIOLOGY PROCEDURE N/A 10/21/2021    Procedure: tunneled central venous catheter placement;  Surgeon: Donnie Robles MD;  Location: [facility] ANGIO INVASIVE LOCATION;  Service: Interventional Radiology;  Laterality: N/A;     PT Assessment (last 12 hours)     PT Evaluation and Treatment     Row Name 01/16/22 1610          Physical Therapy Time and Intention    Subjective Information complains of; pain  -LN     Document Type therapy note (daily note)  -LN     Mode of Treatment individual therapy; physical therapy  -LN     Patient Effort good  -LN     Row Name 01/16/22 1610          General Information    Patient Profile Reviewed yes  -LN     Existing Precautions/Restrictions fall  -LN     Row Name 01/16/22 1610          Cognition    Orientation Status (Cognition) oriented x 3  -LN     Row Name 01/16/22 1610          Pain Scale: Numbers Pre/Post-Treatment    Pretreatment Pain Rating 10/10  -LN     Posttreatment Pain Rating 10/10  -LN     Pain Location back  tailbone  -LN     Pre/Posttreatment Pain Comment tylenol given  -LN     Row Name 01/16/22 1610          Pain Scale: FACES Pre/Post-Treatment    Pain: FACES Scale, Pretreatment 0-->no hurt  -LN     Posttreatment Pain Rating 0-->no hurt  -LN     Row Name 01/16/22 1610          Bed Mobility    Supine-Sit  [place] (Bed Mobility) standby assist  -LN     Sit-Supine [place] (Bed Mobility) standby assist  bed flat no hr  -LN     Assistive Device (Bed Mobility) bed rails; head of bed elevated; draw sheet  -     Row Name 01/16/22 1610          Transfers    Sit-Stand [place] (Transfers) contact guard  -LN     Stand-Sit [place] (Transfers) contact guard  -[facility] Name 01/16/22 1610          Sit-Stand Transfer    Assistive Device (Sit-Stand Transfers) walker, front-wheeled  -LN     Row Name 01/16/22 1610          Stand-Sit Transfer    Assistive Device (Stand-Sit Transfers) walker, front-wheeled  -LN     Row Name 01/16/22 1610          Gait/Stairs (Locomotion)    [place] Level (Gait) contact guard; 1 person assist; 1 person to manage equipment; 2 person assist  -LN     Assistive Device (Gait) walker, front-wheeled  -LN     Distance in Feet (Gait) 30  -LN     Pattern (Gait) step-through  -LN     Deviations/Abnormal Patterns (Gait) base of support, wide; nasir decreased; gait speed decreased; stride length decreased; antalgic  -LN     Bilateral Gait Deviations forward flexed posture  -[facility] Name 01/16/22 1610          Safety Issues, Functional Mobility    Impairments Affecting Function (Mobility) balance; coordination; endurance/activity tolerance; strength; shortness of breath  -[facility] Name 01/16/22 1610          Hip (Therapeutic Exercise)    Hip (Therapeutic Exercise) AROM (active range of motion)  -LN     Hip AROM (Therapeutic Exercise) bilateral; flexion; aDduction; aBduction  -[facility] Name 01/16/22 1610          Knee (Therapeutic Exercise)    Knee (Therapeutic Exercise) AROM (active range of motion)  -LN     Knee AROM (Therapeutic Exercise) bilateral; LAQ (long arc quad)  -[facility] Name 01/16/22 1610          Ankle (Therapeutic Exercise)    Ankle (Therapeutic Exercise) AROM (active range of motion)  -LN     Ankle AROM (Therapeutic Exercise) bilateral; dorsiflexion; plantarflexion   -LN     Row Name 01/16/22 1610          Plan of Care Review    Plan of Care Reviewed With patient  -LN     Outcome Summary sup-sit-sup sba of 1,sit-stand-sit cga of 1,amb 30' with rw and cga of 1-no episode of knees buckling  -LN     Row Name 01/16/22 1610          Bed Mobility Goal 1 (PT)    Activity/Assistive Device (Bed Mobility Goal 1, PT) sit to supine; supine to sit  -LN     [place] Level/Cues Needed (Bed Mobility Goal 1, PT) modified independence  -LN     Time Frame (Bed Mobility Goal 1, PT) by discharge  -LN     Progress/Outcomes (Bed Mobility Goal 1, PT) goal not met  -LN     Row Name 01/16/22 1610          Transfer Goal 1 (PT)    Activity/Assistive Device (Transfer Goal 1, PT) sit-to-stand/stand-to-sit; bed-to-chair/chair-to-bed  -LN     [place] Level/Cues Needed (Transfer Goal 1, PT) standby assist  -LN     Time Frame (Transfer Goal 1, PT) by discharge  -LN     Progress/Outcome (Transfer Goal 1, PT) goal not met  -LN     Row Name 01/16/22 1610          Gait Training Goal 1 (PT)    Activity/Assistive Device (Gait Training Goal 1, PT) gait (walking locomotion); assistive device use  -LN     [place] Level (Gait Training Goal 1, PT) standby assist  -LN     Distance (Gait Training Goal 1, PT) 50'x2  -LN     Time Frame (Gait Training Goal 1, PT) by discharge  -LN     Progress/Outcome (Gait Training Goal 1, PT) goal partially met  -LN     Row Name 01/16/22 1610          Positioning and Restraints    In Bed notified nsg; supine; call light within reach; encouraged to call for assist; exit alarm on  -LN     Row Name 01/16/22 1610          Therapy Assessment/Plan (PT)    Rehab Potential (PT) good, to achieve stated therapy goals  -LN     Criteria for Skilled Interventions Met (PT) yes; meets criteria; skilled treatment is necessary  -LN           User Key  (r) = Recorded By, (t) = Taken By, (c) = Cosigned By    Initials Name Provider Type    Lor Mcgrath, PTA Physical Therapy Assistant                 Physical Therapy Education                 Title: PT OT SLP Therapies (In Progress)     Topic: Physical Therapy (In Progress)     Point: Mobility training (In Progress)     Learning Progress Summary           Patient Acceptance, E,TB, NR by LR at 1/12/2022 1416    Comment: Educated on PT POC and goals.                   Point: Home exercise program (In Progress)     Learning Progress Summary           Patient Acceptance, E,TB, NR by LR at 1/12/2022 1416    Comment: Educated on PT POC and goals.                   Point: Body mechanics (In Progress)     Learning Progress Summary           Patient Acceptance, E,TB, NR by LR at 1/12/2022 1416    Comment: Educated on PT POC and goals.                   Point: Precautions (In Progress)     Learning Progress Summary           Patient Acceptance, E,TB, NR by LR at 1/12/2022 1416    Comment: Educated on PT POC and goals.                               User Key     Initials Effective Dates Name Provider Type Discipline    LR 06/16/21 -  Lucien Gilman Physical Therapist PT              PT Recommendation and Plan  Anticipated Discharge Disposition (PT): skilled nursing facility, home with 24/7 care  Therapy Frequency (PT): other (see comments) (5-7d/week)  Plan of Care Reviewed With: patient  Outcome Summary: sup-sit-sup sba of 1,sit-stand-sit cga of 1,amb 30' with rw and cga of 1-no episode of knees buckling       Time Calculation:    PT Charges     Row Name 01/16/22 1645             Time Calculation    Start Time 1610  -LN      Stop Time 1640  -LN      Time Calculation (min) 30 min  -LN      PT Received On 01/16/22  -LN              Time Calculation- PT    Total Timed Code Minutes- PT 30 minute(s)  -LN            User Key  (r) = Recorded By, (t) = Taken By, (c) = Cosigned By    Initials Name Provider Type    LN Lor Tavarez PTA Physical Therapy Assistant              Therapy Charges for Today     Code Description Service Date Service Provider Modifiers Qty     [id number]  GAIT TRAINING EA 15 MIN 1/16/2022 Lor Tavarez, PTA GP 1    [id number] HC PT THER PROC EA 15 MIN 1/16/2022 Lor Tavarez, RENETTA GP 1          PT G-Codes  Outcome Measure Options: AM-PAC 6 Clicks Daily Activity (OT)  AM-PAC 6 Clicks Score (PT): 18  AM-PAC 6 Clicks Score (OT): 12    Lor Tavarez PTA  1/16/2022

## 2022-01-16 NOTE — PLAN OF CARE
Goal Outcome Evaluation:  Plan of Care Reviewed With: patient           Outcome Summary: Patient rested in bed on personal machine, vss. Patient given medication for hip/buttock pain. Patient refuses bed alarm and stated that she walked into the bathroom herself and back into bed with mild complications but no falls or injury, patient educuated on call bell compliance for safety. VSS.

## 2022-01-16 NOTE — PLAN OF CARE
Goal Outcome Evaluation:  Plan of Care Reviewed With: patient           Outcome Summary: sup-sit-sup sba of 1,sit-stand-sit cga of 1,amb 30' with rw and cga of 1-no episode of knees buckling

## 2022-01-17 LAB
ANION GAP SERPL CALCULATED.3IONS-SCNC: 15 MMOL/L (ref 5–15)
BUN SERPL-MCNC: 53 MG/DL (ref 8–23)
BUN/CREAT SERPL: 6.7 (ref 7–25)
CALCIUM SPEC-SCNC: 8.3 MG/DL (ref 8.6–10.5)
CHLORIDE SERPL-SCNC: 89 MMOL/L (ref 98–107)
CO2 SERPL-SCNC: 21 MMOL/L (ref 22–29)
CREAT SERPL-MCNC: 7.96 MG/DL (ref 0.57–1)
DEPRECATED RDW RBC AUTO: 44 FL (ref 37–54)
ERYTHROCYTE [DISTWIDTH] IN BLOOD BY AUTOMATED COUNT: 14.3 % (ref 12.3–15.4)
GFR SERPL CREATININE-BSD FRML MDRD: 5 ML/MIN/1.73
GFR SERPL CREATININE-BSD FRML MDRD: ABNORMAL ML/MIN/{1.73_M2}
GLUCOSE BLDC GLUCOMTR-MCNC: 108 MG/DL (ref 70–130)
GLUCOSE BLDC GLUCOMTR-MCNC: 158 MG/DL (ref 70–130)
GLUCOSE BLDC GLUCOMTR-MCNC: 241 MG/DL (ref 70–130)
GLUCOSE BLDC GLUCOMTR-MCNC: 244 MG/DL (ref 70–130)
GLUCOSE SERPL-MCNC: 233 MG/DL (ref 65–99)
HCT VFR BLD AUTO: 23.3 % (ref 34–46.6)
HGB BLD-MCNC: 7.5 G/DL (ref 12–15.9)
MCH RBC QN AUTO: 27.5 PG (ref 26.6–33)
MCHC RBC AUTO-ENTMCNC: 32.2 G/DL (ref 31.5–35.7)
MCV RBC AUTO: 85.3 FL (ref 79–97)
PLATELET # BLD AUTO: 271 10*3/MM3 (ref 140–450)
PMV BLD AUTO: 9.6 FL (ref 6–12)
POTASSIUM SERPL-SCNC: 4.8 MMOL/L (ref 3.5–5.2)
RBC # BLD AUTO: 2.73 10*6/MM3 (ref 3.77–5.28)
SODIUM SERPL-SCNC: 125 MMOL/L (ref 136–145)
WBC NRBC COR # BLD: 6.84 10*3/MM3 (ref 3.4–10.8)

## 2022-01-17 PROCEDURE — 85027 COMPLETE CBC AUTOMATED: CPT | Performed by: STUDENT IN AN ORGANIZED HEALTH CARE EDUCATION/TRAINING PROGRAM

## 2022-01-17 PROCEDURE — 99231 SBSQ HOSP IP/OBS SF/LOW 25: CPT | Performed by: STUDENT IN AN ORGANIZED HEALTH CARE EDUCATION/TRAINING PROGRAM

## 2022-01-17 PROCEDURE — 80048 BASIC METABOLIC PNL TOTAL CA: CPT | Performed by: STUDENT IN AN ORGANIZED HEALTH CARE EDUCATION/TRAINING PROGRAM

## 2022-01-17 PROCEDURE — 94799 UNLISTED PULMONARY SVC/PX: CPT

## 2022-01-17 PROCEDURE — 25010000002 HEPARIN (PORCINE) PER 1000 UNITS: Performed by: STUDENT IN AN ORGANIZED HEALTH CARE EDUCATION/TRAINING PROGRAM

## 2022-01-17 PROCEDURE — 97116 GAIT TRAINING THERAPY: CPT

## 2022-01-17 PROCEDURE — 25010000002 EPOETIN ALFA-EPBX 10000 UNIT/ML SOLUTION: Performed by: INTERNAL MEDICINE

## 2022-01-17 PROCEDURE — 97535 SELF CARE MNGMENT TRAINING: CPT

## 2022-01-17 PROCEDURE — 82962 GLUCOSE BLOOD TEST: CPT

## 2022-01-17 PROCEDURE — 25010000002 HEPARIN (PORCINE) PER 1000 UNITS: Performed by: INTERNAL MEDICINE

## 2022-01-17 PROCEDURE — 63710000001 INSULIN DETEMIR PER 5 UNITS: Performed by: STUDENT IN AN ORGANIZED HEALTH CARE EDUCATION/TRAINING PROGRAM

## 2022-01-17 PROCEDURE — 97110 THERAPEUTIC EXERCISES: CPT

## 2022-01-17 PROCEDURE — 97530 THERAPEUTIC ACTIVITIES: CPT

## 2022-01-17 PROCEDURE — 63710000001 INSULIN ASPART PER 5 UNITS: Performed by: STUDENT IN AN ORGANIZED HEALTH CARE EDUCATION/TRAINING PROGRAM

## 2022-01-17 PROCEDURE — 94760 N-INVAS EAR/PLS OXIMETRY 1: CPT

## 2022-01-17 RX ORDER — HEPARIN SODIUM 1000 [USP'U]/ML
4000 INJECTION, SOLUTION INTRAVENOUS; SUBCUTANEOUS AS NEEDED
Status: DISCONTINUED | OUTPATIENT
Start: 2022-01-17 | End: 2022-01-20 | Stop reason: HOSPADM

## 2022-01-17 RX ORDER — ALBUMIN (HUMAN) 12.5 G/50ML
12.5 SOLUTION INTRAVENOUS AS NEEDED
Status: ACTIVE | OUTPATIENT
Start: 2022-01-17 | End: 2022-01-18

## 2022-01-17 RX ADMIN — LEVOTHYROXINE SODIUM 25 MCG: 25 TABLET ORAL at 11:32

## 2022-01-17 RX ADMIN — OXYBUTYNIN CHLORIDE 5 MG: 5 TABLET, EXTENDED RELEASE ORAL at 11:32

## 2022-01-17 RX ADMIN — DOCUSATE SODIUM 50 MG AND SENNOSIDES 8.6 MG 2 TABLET: 8.6; 5 TABLET, FILM COATED ORAL at 11:31

## 2022-01-17 RX ADMIN — HEPARIN SODIUM 4000 UNITS: 1000 INJECTION INTRAVENOUS; SUBCUTANEOUS at 17:42

## 2022-01-17 RX ADMIN — LIDOCAINE 1 PATCH: 50 PATCH CUTANEOUS at 09:00

## 2022-01-17 RX ADMIN — HEPARIN SODIUM 5000 UNITS: 5000 INJECTION INTRAVENOUS; SUBCUTANEOUS at 05:06

## 2022-01-17 RX ADMIN — RANOLAZINE 500 MG: 500 TABLET, FILM COATED, EXTENDED RELEASE ORAL at 21:24

## 2022-01-17 RX ADMIN — ISOSORBIDE MONONITRATE 30 MG: 30 TABLET, EXTENDED RELEASE ORAL at 05:05

## 2022-01-17 RX ADMIN — ATORVASTATIN CALCIUM 20 MG: 20 TABLET, FILM COATED ORAL at 11:32

## 2022-01-17 RX ADMIN — INSULIN ASPART 30 UNITS: 100 INJECTION, SOLUTION INTRAVENOUS; SUBCUTANEOUS at 11:34

## 2022-01-17 RX ADMIN — RANOLAZINE 500 MG: 500 TABLET, FILM COATED, EXTENDED RELEASE ORAL at 11:32

## 2022-01-17 RX ADMIN — SODIUM CHLORIDE, PRESERVATIVE FREE 10 ML: 5 INJECTION INTRAVENOUS at 21:26

## 2022-01-17 RX ADMIN — PANTOPRAZOLE SODIUM 40 MG: 40 TABLET, DELAYED RELEASE ORAL at 05:06

## 2022-01-17 RX ADMIN — DOCUSATE SODIUM 50 MG AND SENNOSIDES 8.6 MG 2 TABLET: 8.6; 5 TABLET, FILM COATED ORAL at 21:24

## 2022-01-17 RX ADMIN — GABAPENTIN 300 MG: 300 CAPSULE ORAL at 21:24

## 2022-01-17 RX ADMIN — CLOPIDOGREL 75 MG: 75 TABLET, FILM COATED ORAL at 11:32

## 2022-01-17 RX ADMIN — INSULIN ASPART 8 UNITS: 100 INJECTION, SOLUTION INTRAVENOUS; SUBCUTANEOUS at 08:20

## 2022-01-17 RX ADMIN — INSULIN ASPART 8 UNITS: 100 INJECTION, SOLUTION INTRAVENOUS; SUBCUTANEOUS at 11:33

## 2022-01-17 RX ADMIN — GABAPENTIN 300 MG: 300 CAPSULE ORAL at 05:06

## 2022-01-17 RX ADMIN — GABAPENTIN 300 MG: 300 CAPSULE ORAL at 15:28

## 2022-01-17 RX ADMIN — INSULIN DETEMIR 30 UNITS: 100 INJECTION, SOLUTION SUBCUTANEOUS at 08:19

## 2022-01-17 RX ADMIN — HEPARIN SODIUM 5000 UNITS: 5000 INJECTION INTRAVENOUS; SUBCUTANEOUS at 15:29

## 2022-01-17 RX ADMIN — MONTELUKAST 10 MG: 10 TABLET, FILM COATED ORAL at 21:24

## 2022-01-17 RX ADMIN — IPRATROPIUM BROMIDE AND ALBUTEROL SULFATE 3 ML: 2.5; .5 SOLUTION RESPIRATORY (INHALATION) at 18:48

## 2022-01-17 RX ADMIN — LISINOPRIL 5 MG: 5 TABLET ORAL at 11:32

## 2022-01-17 RX ADMIN — IPRATROPIUM BROMIDE AND ALBUTEROL SULFATE 3 ML: 2.5; .5 SOLUTION RESPIRATORY (INHALATION) at 08:39

## 2022-01-17 RX ADMIN — EPOETIN ALFA-EPBX 10000 UNITS: 10000 INJECTION, SOLUTION INTRAVENOUS; SUBCUTANEOUS at 17:15

## 2022-01-17 RX ADMIN — ASPIRIN 81 MG: 81 TABLET, FILM COATED ORAL at 11:32

## 2022-01-17 RX ADMIN — NYSTATIN: 100000 POWDER TOPICAL at 21:25

## 2022-01-17 RX ADMIN — MIDODRINE HYDROCHLORIDE 5 MG: 5 TABLET ORAL at 11:32

## 2022-01-17 RX ADMIN — INSULIN ASPART 30 UNITS: 100 INJECTION, SOLUTION INTRAVENOUS; SUBCUTANEOUS at 08:19

## 2022-01-17 RX ADMIN — IPRATROPIUM BROMIDE AND ALBUTEROL SULFATE 3 ML: 2.5; .5 SOLUTION RESPIRATORY (INHALATION) at 12:35

## 2022-01-17 RX ADMIN — METOPROLOL TARTRATE 25 MG: 25 TABLET, FILM COATED ORAL at 21:24

## 2022-01-17 RX ADMIN — NYSTATIN: 100000 POWDER TOPICAL at 09:00

## 2022-01-17 RX ADMIN — HEPARIN SODIUM 5000 UNITS: 5000 INJECTION INTRAVENOUS; SUBCUTANEOUS at 21:25

## 2022-01-17 RX ADMIN — INSULIN DETEMIR 35 UNITS: 100 INJECTION, SOLUTION SUBCUTANEOUS at 20:32

## 2022-01-17 RX ADMIN — HEPARIN SODIUM 2000 UNITS: 1000 INJECTION INTRAVENOUS; SUBCUTANEOUS at 14:00

## 2022-01-17 RX ADMIN — METOPROLOL TARTRATE 25 MG: 25 TABLET, FILM COATED ORAL at 11:32

## 2022-01-17 NOTE — PLAN OF CARE
Goal Outcome Evaluation:  Plan of Care Reviewed With: patient        Progress: improving  Outcome Summary: Pt was SBA with bed mobility. Pt was set with grooming task. Pt gave good effort with UE ther ex. Discussed home safety/EC. Continue OT POC.

## 2022-01-17 NOTE — THERAPY TREATMENT NOTE
Acute Care - Physical Therapy Treatment Note  [facility]     Patient Name: Yamileth Slater  : 1960  MRN: [id number]  Today's Date: 2022      Visit Dx:     ICD-10-CM ICD-9-CM   1. Diabetic ketoacidosis with coma associated with other specified diabetes mellitus ([facility])  E13.11 250.32   2. Hypertension, unspecified type  I10 401.9   3. Altered mental status, unspecified altered mental status type  R41.82 780.97   4. Dysphagia, unspecified type  R13.10 787.20   5. Impaired functional mobility, balance, gait, and endurance  Z74.09 V49.89   6. Impaired mobility and ADLs  Z74.09 V49.89    Z78.9      Patient Active Problem List   Diagnosis   • Chronic obstructive pulmonary disease (HCC)   • Chronic midline low back pain without sciatica   • Vitamin D deficiency   • Tobacco dependence syndrome   • Surgical follow-up care   • Shoulder joint pain   • Peripheral vascular disease ([facility])   • Pain   • Neurologic disorder associated with diabetes mellitus ([facility])   • Morbid obesity with BMI of 50.0-59.9, adult ([facility])   • Mixed hyperlipidemia   • Kidney stone   • History of colon polyps   • GERD (gastroesophageal reflux disease)   • Excoriated eczema   • Hypertension   • Encounter for medication refill   • Dyslipidemia   • Diabetes mellitus ([facility])   • COPD (chronic obstructive pulmonary disease) ([facility])   • Chronic folliculitis   • Coronary artery disease   • Mild persistent asthma   • 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   • Fall   • Hypothyroidism   • Carotid artery disease ([facility])   • Current smoker   • [place] abuse   • PAD (peripheral artery disease) ([facility])   • Intercostal pain   • Venous insufficiency   • LAFB (left anterior fascicular block)   • Pulmonary hypertension ([facility])   • Left hip pain   • Neck pain   • CKD (chronic kidney disease), symptom management only, stage 5 ([facility])   • MIKE and COPD overlap syndrome ([facility])   • Pneumonia due to COVID-19 virus   • Hyperkalemia   • Cutaneous candidiasis    • Community acquired pneumonia of right middle lobe of lung   • MRSA infection   • Alkaline phosphatase elevation   • COVID-19 ruled out by laboratory testing   • Esophageal dysphagia   • Monilial esophagitis ([facility])   • NSTEMI, initial episode of care ([facility])   • Pneumonia due to infectious organism   • Cellulitis of left leg   • Unspecified diastolic (congestive) heart failure ([facility])   • Hyponatremia   • Urinary tract infection due to Proteus   • History of insertion of tunneled central venous catheter (CVC) with port   • Anemia due to chronic kidney disease, on chronic dialysis ([facility])   • Pneumonitis   • Personal history of noncompliance with medical treatment, presenting hazards to health   • Type 2 diabetes mellitus with kidney complication, with long-term current use of insulin ([facility])   • Physical deconditioning     Past Medical History:   Diagnosis Date   • Acute blood loss anemia 4/16/2017    Likely due to gastric oozing at this time. - Dr. Duarte (GI) was consulted and has now signed off, will follow up outpatient - pill colonoscopy showed AVMs - continue to monitor   • Acute cystitis with hematuria 3/31/2021    1/13: IV Rocephin 1 gm q 24 1/14 : transitioned  to omnicef 300mg. Urine cultures resulted and did not show growth. Omnicef discontinued as patient is asymptomatic   • Altered mental status 1/9/2022    - AMS on presentation - initial ABG pH 7.3, CO2 34 - Procal 0.29 - UA negative for acute cysitits -CTA head wnl  - Empiric Zosyn and Vancomycin -Lactate 2.5 on admission  - blood cultures no growth at 24 hours     • Anxiety    • CAD (coronary artery disease) 4/24/2021    S/P 3 stents 5/1/2021 for [facility] Continue ASA 81mg & Clopidogrel 75mg Continue Atorvastatin 40mg   • Carotid artery stenosis    • Chronic obstructive lung disease ([facility])    • CKD (chronic kidney disease) stage 4, GFR 15-29 ml/min ([facility])    • Colonic polyp    • Coronary arteriosclerosis    • Diabetes mellitus ([facility])    • Diabetic neuropathy ([facility])     • Ear pain, right 10/18/2021    - canal trauma due to patient scratching and DMT2 - added cortisporin ear drops   • Elevated troponin 10/12/2021    -most likely from CKD -Trending down -Neg chest pain   • GERD (gastroesophageal reflux disease)    • GI bleed 5/13/2021    - GI will follow up outpatient - Protonix 40mg daily - Avoid medical DVT prophy and use mechanical at this time instead. - Continue to monitor - pill colonoscopy results showed AVMs   • History of transfusion    • Hypercholesterolemia    • Hypertension    • Hypomagnesemia 6/27/2021    Monitor and replace   • Morbid obesity (HCC)    • Nephrolithiasis    • Peripheral vascular disease ([facility])    • SIRS (systemic inflammatory response syndrome) ([facility]) 1/9/2022    Admission  - WBC 17.78   -   - RR 16 - 1/10: VSS/wnl - CXR - Mild pulm edema - Blood cultures no growth at 24 hours  - Procalcitonin 0.29 - UA : glucose 1000, negative Leucocytes/nitrate - Empiric Zosyn and Vancomcyin    • Sleep apnea    • Substance abuse ([facility])    • Vitamin D deficiency      Past Surgical History:   Procedure Laterality Date   • CARDIAC CATHETERIZATION N/A 7/14/2020   • CARDIAC CATHETERIZATION N/A 4/23/2021    Procedure: Left Heart Cath;  Surgeon: Melba Romo MD;  Location: [facility] CATH INVASIVE LOCATION;  Service: Cardiology;  Laterality: N/A;   • CARDIAC CATHETERIZATION N/A 4/30/2021    Procedure: Percutaneous Coronary Intervention;  Surgeon: Russell Voss MD;  Location: [facility] CATH INVASIVE LOCATION;  Service: Cardiovascular;  Laterality: N/A;   • CARDIAC CATHETERIZATION N/A 4/30/2021    Procedure: Stent NIKKI coronary;  Surgeon: Russell Voss MD;  Location: [facility] CATH INVASIVE LOCATION;  Service: Cardiovascular;  Laterality: N/A;   • CARDIAC CATHETERIZATION Left 11/13/2021    Procedure: Left Heart Cath;  Surgeon: Niall Rios MD;  Location: [facility] CATH INVASIVE LOCATION;  Service: Cardiology;  Laterality: Left;   • CAROTID STENT Left    •  COLONOSCOPY     • COLONOSCOPY N/A 5/14/2021    Procedure: COLONOSCOPY;  Surgeon: Mingo Duarte MD;  Location: [facility] ENDOSCOPY;  Service: Gastroenterology;  Laterality: N/A;   • CORONARY ARTERY BYPASS GRAFT N/A 2013    CABG X 3   • CYSTOSCOPY BLADDER STONE LITHOTRIPSY Bilateral    • ENDOSCOPY N/A 4/12/2021    Procedure: ESOPHAGOGASTRODUODENOSCOPY;  Surgeon: Mingo Duarte MD;  Location: [facility] ENDOSCOPY;  Service: Gastroenterology;  Laterality: N/A;   • ENDOSCOPY N/A 5/14/2021    Procedure: ESOPHAGOGASTRODUODENOSCOPY;  Surgeon: Mingo Duarte MD;  Location: [facility] ENDOSCOPY;  Service: Gastroenterology;  Laterality: N/A;   • INTERVENTIONAL RADIOLOGY PROCEDURE N/A 10/21/2021    Procedure: tunneled central venous catheter placement;  Surgeon: Donnie Robles MD;  Location: [facility] ANGIO INVASIVE LOCATION;  Service: Interventional Radiology;  Laterality: N/A;     PT Assessment (last 12 hours)     PT Evaluation and Treatment     Row Name 01/17/22 1333          Physical Therapy Time and Intention    Document Type therapy note (daily note)  -     Mode of Treatment individual therapy; physical therapy  -     Patient Effort good  -     Row Name 01/17/22 1333          General Information    Patient Profile Reviewed yes  -     Existing Precautions/Restrictions fall  -     Row Name 01/17/22 6993          Cognition    Affect/Mental Status (Cognitive) WFL  -     Orientation Status (Cognition) oriented x 3  -     Personal Safety Interventions fall prevention program maintained; gait belt; muscle strengthening facilitated; nonskid shoes/slippers when out of bed; supervised activity  -     Row Name 01/17/22 9943          Pain Scale: Numbers Pre/Post-Treatment    Pretreatment Pain Rating 6/10  -     Posttreatment Pain Rating 6/10  -     Pain Location --  coccyx  -     Row Name 01/17/22 4823          Pain Scale: FACES Pre/Post-Treatment    Pain: FACES Scale, Pretreatment --  -HENRIQUE      Posttreatment Pain Rating --  -[facility] Name 01/17/22 1333          Bed Mobility    Bed Mobility supine-sit; sit-supine  -     Supine-Sit [place] (Bed Mobility) minimum assist (75% patient effort)  -     Sit-Supine [place] (Bed Mobility) minimum assist (75% patient effort)  -     Assistive Device (Bed Mobility) bed rails; head of bed elevated; draw sheet  -     Row Name 01/17/22 1333          Transfers    Transfers toilet transfer  -     Sit-Stand [place] (Transfers) contact guard; minimum assist (75% patient effort)  -     Stand-Sit [place] (Transfers) contact guard  -     [place] Level (Toilet Transfer) contact guard; minimum assist (75% patient effort)  -     Assistive Device (Toilet Transfer) walker, front-wheeled  -[facility] Name 01/17/22 1333          Sit-Stand Transfer    Assistive Device (Sit-Stand Transfers) walker, front-wheeled  -[facility] Name 01/17/22 1333          Stand-Sit Transfer    Assistive Device (Stand-Sit Transfers) walker, front-wheeled  -[facility] Name 01/17/22 1333          Toilet Transfer    Type (Toilet Transfer) stand-sit; sit-stand  -[facility] Name 01/17/22 1333          Gait/Stairs (Locomotion)    Gait/Stairs Locomotion gait/ambulation independence; gait/ambulation assistive device; distance ambulated; gait pattern; gait deviations; maintains weight-bearing status  -     [place] Level (Gait) contact guard  -     Assistive Device (Gait) walker, front-wheeled  -     Distance in Feet (Gait) 6, 6, 30, 30  -     Pattern (Gait) step-through  -     Deviations/Abnormal Patterns (Gait) base of support, wide; nasir decreased; gait speed decreased; stride length decreased; antalgic  -     Bilateral Gait Deviations forward flexed posture  -     Row Name 01/17/22 1333          Safety Issues, Functional Mobility    Impairments Affecting Function (Mobility) balance; coordination; endurance/activity tolerance; strength; shortness of  breath  -     Row Name 01/17/22 1333          Vital Signs    Pre Systolic BP Rehab 138  -HENRQIUE     Pre Treatment Diastolic BP 60  -HENRIQUE     Post Systolic BP Rehab --  pt w/ dialysis nurse at end of PT session  -HENRIQUE     Pretreatment Heart Rate (beats/min) 67  -HENRIQUE     Intratreatment Heart Rate (beats/min) 67  -HENRIQUE     Pre SpO2 (%) 99  -HENRIQUE     O2 Delivery Pre Treatment room air  -HENRIQUE     Intra SpO2 (%) 98  -HENRIQUE     O2 Delivery Intra Treatment room air  -HENRIQUE     Pre Patient Position Supine  -HENRIQUE     Post Patient Position Supine  -HENRIQUE     Row Name 01/17/22 1333          Bed Mobility Goal 1 (PT)    Activity/Assistive Device (Bed Mobility Goal 1, PT) sit to supine; supine to sit  -HENRIQUE     [place] Level/Cues Needed (Bed Mobility Goal 1, PT) modified independence  -HENRIQUE     Time Frame (Bed Mobility Goal 1, PT) by discharge  -HENRIQUE     Progress/Outcomes (Bed Mobility Goal 1, PT) goal not met  -     Row Name 01/17/22 1333          Transfer Goal 1 (PT)    Activity/Assistive Device (Transfer Goal 1, PT) sit-to-stand/stand-to-sit; bed-to-chair/chair-to-bed  -HENRIQUE     [place] Level/Cues Needed (Transfer Goal 1, PT) standby assist  -HENRIQUE     Time Frame (Transfer Goal 1, PT) by discharge  -HENRIQUE     Progress/Outcome (Transfer Goal 1, PT) goal not met  -     Row Name 01/17/22 1333          Gait Training Goal 1 (PT)    Activity/Assistive Device (Gait Training Goal 1, PT) gait (walking locomotion); assistive device use  -HENRIQUE     [place] Level (Gait Training Goal 1, PT) standby assist  -HENRIQUE     Distance (Gait Training Goal 1, PT) 50'x2  -HENRIQUE     Time Frame (Gait Training Goal 1, PT) by discharge  -HENRIQUE     Progress/Outcome (Gait Training Goal 1, PT) goal partially met  -     Row Name 01/17/22 1333          Positioning and Restraints    Pre-Treatment Position in bed  -HENRIQUE     Post Treatment Position bed  -HENRIQUE     In Bed fowlers; call light within reach; encouraged to call for assist; exit alarm on  w/ nsg  -HENRIQUE     Row Name 01/17/22 1332           Therapy Assessment/Plan (PT)    Rehab Potential (PT) good, to achieve stated therapy goals  -     Criteria for Skilled Interventions Met (PT) yes; meets criteria; skilled treatment is necessary  -           User Key  (r) = Recorded By, (t) = Taken By, (c) = Cosigned By    Initials Name Provider Type    HENRIQUE Oliver Bennett PTA Physical Therapy Assistant                Physical Therapy Education                 Title: PT OT SLP Therapies (In Progress)     Topic: Physical Therapy (In Progress)     Point: Mobility training (In Progress)     Learning Progress Summary           Patient Acceptance, E, NR by HENRIQUE at 1/17/2022 1600    Acceptance, E,TB, NR by LR at 1/12/2022 1416    Comment: Educated on PT POC and goals.                   Point: Home exercise program (In Progress)     Learning Progress Summary           Patient Acceptance, E,TB, NR by LR at 1/12/2022 1416    Comment: Educated on PT POC and goals.                   Point: Body mechanics (In Progress)     Learning Progress Summary           Patient Acceptance, E,TB, NR by LR at 1/12/2022 1416    Comment: Educated on PT POC and goals.                   Point: Precautions (In Progress)     Learning Progress Summary           Patient Acceptance, E,TB, NR by LR at 1/12/2022 1416    Comment: Educated on PT POC and goals.                               User Key     Initials Effective Dates Name Provider Type Discipline     06/16/21 -  Oliver Bennett PTA Physical Therapy Assistant PT    LR 06/16/21 -  Lucien Gilman Physical Therapist PT              PT Recommendation and Plan  Anticipated Discharge Disposition (PT): skilled nursing facility, home with 24/7 care  Therapy Frequency (PT): other (see comments) (5-7d/week)  Plan of Care Reviewed With: patient  Progress: improving  Outcome Summary: pt is pleasant and agreeable to PT. pt requires min A for sup-sit-sup. CGA-min A for sit-stand and t/fs. pt participated in gait training, 6, 6, 30, 30 ft CGA w/ RW. tx  ended as dialysis nurse arrived. no new goals met at this time. pt would continue to benefit from PT services.   Outcome Measures     Row Name 01/17/22 1333             How much help from another person do you currently need...    Turning from your back to your side while in flat bed without using bedrails? 3  -HENRIQUE      Moving from lying on back to sitting on the side of a flat bed without bedrails? 3  -HENRIQUE      Moving to and from a bed to a chair (including a wheelchair)? 3  -HENRIQUE      Standing up from a chair using your arms (e.g., wheelchair, bedside chair)? 3  -HENRIQUE      Climbing 3-5 steps with a railing? 3  -HENRIQUE      To walk in hospital room? 3  -HENRIQUE      AM-PAC 6 Clicks Score (PT) 18  -HENRIQUE              Functional Assessment    Outcome Measure Options AM-PAC 6 Clicks Basic Mobility (PT)  -HENRIQUE            User Key  (r) = Recorded By, (t) = Taken By, (c) = Cosigned By    Initials Name Provider Type     Oliver Bennett PTA Physical Therapy Assistant                 Time Calculation:    PT Charges     Row Name 01/17/22 1602             Time Calculation    Start Time 1333  -HENRIQUE      Stop Time 1357  -HENRIQUE      Time Calculation (min) 24 min  -HENRIQUE              Time Calculation- PT    Total Timed Code Minutes- PT 24 minute(s)  -HENRIQUE              Timed Charges    85984 - Gait Training Minutes  9  -HENRIQUE      77053 - PT Therapeutic Activity Minutes 15  -HENRIQUE              Total Minutes    Timed Charges Total Minutes 24  -HENRIQUE       Total Minutes 24  -HENRIQUE            User Key  (r) = Recorded By, (t) = Taken By, (c) = Cosigned By    Initials Name Provider Type     Oliver Bennett PTA Physical Therapy Assistant              Therapy Charges for Today     Code Description Service Date Service Provider Modifiers Qty    77463551948 HC GAIT TRAINING EA 15 MIN 1/17/2022 Oliver Bennett, PTA GP 1    65340693756 HC PT THERAPEUTIC ACT EA 15 MIN 1/17/2022 Oliver Bennett PTA GP 1          PT G-Codes  Outcome Measure Options: AM-PAC 6 Clicks Basic  Mobility (PT)  AM-PAC 6 Clicks Score (PT): 18  AM-PAC 6 Clicks Score (OT): 12    Oliver Bennett, PTA  1/17/2022

## 2022-01-17 NOTE — PROGRESS NOTES
"[facility]  [address] [place] [address]. [address]  T - [phone]  F - [phone]     Progress Note          PATIENT  DEMOGRAPHICS   PATIENT NAME: Yamileth Slater                      PHYSICIAN: Ace Lauren MD  : 1960  MRN: [id number]   LOS: 7 days    Patient Care Team:  Rianna Macias MD as PCP - General (Family Medicine)  Subjective   SUBJECTIVE   No soa, awaiting hd        Objective   OBJECTIVE   Vital Signs  Temp:  [97.6 °F (36.4 °C)-98 °F (36.7 °C)] 97.6 °F (36.4 °C)  Heart Rate:  [58-88] 62  Resp:  [16-18] 16  BP: (119-158)/(58-70) 119/58    Flowsheet Rows      First Filed Value   Admission Height 167.6 cm (66\") Documented at 2022 1304   Admission Weight 132 kg (291 lb 9.6 oz) Documented at 2022 1048           I/O last 3 completed shifts:  In: 600 [P.O.:600]  Out: -     PHYSICAL EXAM    Physical Exam  Constitutional:       Appearance: She is well-developed.   HENT:      Head: Normocephalic.   Eyes:      Pupils: Pupils are equal, round, and reactive to light.   Cardiovascular:      Rate and Rhythm: Normal rate and regular rhythm.      Heart sounds: Normal heart sounds.   Pulmonary:      Effort: Pulmonary effort is normal.      Breath sounds: Normal breath sounds.   Abdominal:      General: Bowel sounds are normal.      Palpations: Abdomen is soft.   Musculoskeletal:         General: No swelling.   Skin:     Coloration: Skin is not jaundiced.   Neurological:      Mental Status: She is alert and oriented to person, place, and time.         RESULTS   Results Review:    Results from last 7 days   Lab Units 22  0606 22  0701 01/15/22  0719   SODIUM mmol/L 125* 127* 130*   POTASSIUM mmol/L 4.8 4.6 4.7   CHLORIDE mmol/L 89* 91* 93*   CO2 mmol/L 21.0* 22.0 23.0   BUN mg/dL 53* 49* 41*   CREATININE mg/dL 7.96* 7.77* 7.23*   CALCIUM mg/dL 8.3* 8.5* 8.0*   GLUCOSE mg/dL 233* 284* 179*       Estimated Creatinine Clearance: 10.2 mL/min (A) (by C-G " formula based on SCr of 7.96 mg/dL (H)).    Results from last 7 days   Lab Units 01/13/22  0621 01/12/22  1950 01/12/22  1634   PHOSPHORUS mg/dL 6.0* 4.1 4.0             Results from last 7 days   Lab Units 01/17/22  0606 01/16/22  0701 01/15/22  0719 01/14/22  0621 01/13/22  0621   WBC 10*3/mm3 6.84 6.84 8.68 7.66 9.06   HEMOGLOBIN g/dL 7.5* 7.6* 7.7* 8.0* 8.5*   PLATELETS 10*3/mm3 271 266 225 204 220               Imaging Results (Last 24 Hours)     ** No results found for the last 24 hours. **           MEDICATIONS    aspirin, 81 mg, Oral, Daily  atorvastatin, 20 mg, Oral, Daily  bumetanide, 2 mg, Oral, Once per day on Sun Tue Thu Sat  clopidogrel, 75 mg, Oral, Daily  gabapentin, 300 mg, Oral, Q8H  heparin (porcine), 5,000 Units, Subcutaneous, Q8H  insulin aspart, 0-24 Units, Subcutaneous, TID AC  insulin aspart, 30 Units, Subcutaneous, TID With Meals  [START ON 1/18/2022] insulin detemir, 30 Units, Subcutaneous, Daily  insulin detemir, 35 Units, Subcutaneous, Nightly  ipratropium-albuterol, 3 mL, Nebulization, 4x Daily - RT  isosorbide mononitrate, 30 mg, Oral, QAM  levothyroxine, 25 mcg, Oral, Daily  lidocaine, 1 patch, Transdermal, Q24H  lisinopril, 5 mg, Oral, Daily  metoprolol tartrate, 25 mg, Oral, Q12H  midodrine, 5 mg, Oral, Once per day on Mon Wed Fri  montelukast, 10 mg, Oral, Nightly  nystatin, , Topical, Q12H  oxybutynin XL, 5 mg, Oral, Daily  pantoprazole, 40 mg, Oral, Q AM  ranolazine, 500 mg, Oral, Q12H  senna-docusate sodium, 2 tablet, Oral, BID  sodium chloride, 10 mL, Intravenous, Q12H      O2, 3 L/min, Last Rate: 3 L/min (01/09/22 1809)  sodium chloride, 10 mL/hr        Assessment/Plan   ASSESSMENT / PLAN      Fall    Hypertension    COPD (chronic obstructive pulmonary disease) (HCC)    Coronary artery disease    Hypothyroidism    MIKE and COPD overlap syndrome (HCC)    Unspecified diastolic (congestive) heart failure (HCC)    Anemia due to chronic kidney disease, on chronic dialysis (HCC)     Personal history of noncompliance with medical treatment, presenting hazards to health    Type 2 diabetes mellitus with kidney complication, with long-term current use of insulin (HCC)    Physical deconditioning    1.  ESRD on HD- Initiated HD on 10/21 due to hyperkalemia and fluid overload, now  ESRD.  HD MWF for now.  Keep bumex 2mg on non dialysis days. Euvolemic at present. Cr much higher this admission pre dialysis than it used to be ?due to poor catheter flow vs loss of function.    -keep midodrine pre dialysis.  If catheter not working today then needs line exchange, will evaluate after HD today     2.  Hyponatremia- in the setting of severe hyperglycemia, hyperosmolar hyponatremia. Now better with glucose correction     3.  HHS-started on insulin drip, managed per primary team. Off insulin drip. On aspart and levemir     4.  History of CAD     5.  History of COPD      6.  Anemia / previous GI bleed / b12 deficiency-  s/p capsule endoscopy which showed few small non-bleeding intestinal AVMs and single small polyp. Hgb slowly dropping.     7.  HTN- Continue home antihypertensives. Metoprolol lowered to 25mg    dispo awaiting snf approval           This document has been electronically signed by Ace Lauren MD on January 17, 2022 12:05 CST

## 2022-01-17 NOTE — PAYOR COMM NOTE
"    Karmen Colorado RN [facility]  [phone]     Phone  [phone]      Fax  Cont. Stay Review      Yamileth Slater (61 y.o. 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                 Active Insurance as of 1/9/2022     Primary Coverage     Payor Plan Insurance Group Employer/Plan Group    ANTHEM MEDICARE REPLACEMENT ANTHEM MEDICARE ADVANTAGE [id number]     Payor Plan Address Payor Plan Phone Number Payor Plan Fax Number Effective Dates    [address] [phone]  1/1/2022 - None Entered    [facility] [phone]       Subscriber Name Subscriber Birth Date Member ID       YAMILETH SLATER 1960 [id number]           Secondary Coverage     Payor Plan Insurance Group Employer/Plan Group    [place]      Payor Plan Address Payor Plan Phone Number Payor Plan Fax Number Effective Dates    [address] [phone]  6/28/2019 - None Entered    [facility] [id number]       Subscriber Name Subscriber Birth Date Member ID       YAMILETH SLATER 1960 [id number]                 Emergency Contacts      (Rel.) Home Phone Work Phone Mobile Phone    Yamileth Chavez " (Daughter) [phone] -- [phone]    Suzanne Rivera (Daughter) [phone] -- [phone]    Huy Slater (Spouse) [phone] -- [phone]            Vital Signs (last day)     Date/Time Temp Temp src Pulse Resp BP Patient Position SpO2    01/17/22 0848 -- -- 62 16 -- -- --    01/17/22 0839 -- -- 88 18 -- -- 95    01/17/22 0747 97.6 (36.4) Temporal 63 18 119/58 Lying 100    01/17/22 0728 -- -- 61 -- -- -- --    01/17/22 0123 -- -- 58 -- -- -- --    01/16/22 2011 97.9 (36.6) Axillary 61 18 149/70 Lying 100    01/16/22 2006 -- -- 63 -- -- -- 100    01/16/22 1958 -- -- 63 18 -- -- 100    01/16/22 1558 98 (36.7) Oral 60 18 158/67 Lying 100    01/16/22 1553 -- -- 59 -- -- -- --    01/16/22 1526 -- -- 67 18 -- -- --    01/16/22 1512 -- -- 60 18 -- -- 99    01/16/22 1203 -- -- 66 -- -- -- --    01/16/22 0803 -- -- 67 18 -- -- --    01/16/22 0752 -- -- 65 18 -- -- 99    01/16/22 0717 97.9 (36.6) Temporal 65 18 122/59 Lying 99    01/16/22 0424 96.9 (36.1) Axillary 66 20 151/68 Lying 100    01/16/22 0128 -- -- 67 -- -- -- --          Oxygen Therapy (last day)     Date/Time SpO2 Device (Oxygen Therapy) Flow (L/min) Oxygen Concentration (%) ETCO2 (mmHg)    01/17/22 0839 95 nasal cannula 3 -- --    01/17/22 0747 100 nasal cannula -- -- --    01/16/22 2236 -- NPPV/NIV -- -- --    01/16/22 2011 100 CPAP -- -- --    01/16/22 2006 100 nasal cannula 2 -- --    01/16/22 1958 100 nasal cannula 2 -- --    01/16/22 1558 100 humidified; nasal cannula 2 -- --    01/16/22 1526 -- humidified; nasal cannula 2 -- --    01/16/22 1512 99 nasal cannula 2 -- --    01/16/22 0803 -- nasal cannula 2 -- --    01/16/22 0752 99 nasal cannula 3 -- --    01/16/22 0717 99 CPAP -- -- --    01/16/22 0424 100 CPAP 3 -- --          Current Facility-Administered Medications   Medication Dose Route Frequency Provider Last Rate Last Admin   • acetaminophen (TYLENOL) tablet 650 mg  650 mg Oral Q4H PRN Paulina Solano MD   650 mg at 01/16/22 0943     Or   • acetaminophen (TYLENOL) 160 MG/5ML solution 650 mg  650 mg Oral Q4H PRN Paulina Solano MD        Or   • acetaminophen (TYLENOL) suppository 650 mg  650 mg Rectal Q4H PRN Paulina Solano MD       • albumin human 25 % IV SOLN 12.5 g  12.5 g Intravenous PRN Timothy Valle MD       • albuterol (PROVENTIL) nebulizer solution 0.083% 2.5 mg/3mL  2.5 mg Nebulization Q4H PRN Paulina Solano MD       • aspirin EC tablet 81 mg  81 mg Oral Daily Paulina Solano MD   81 mg at 01/16/22 0833   • atorvastatin (LIPITOR) tablet 20 mg  20 mg Oral Daily Paulina Solano MD   20 mg at 01/16/22 0833   • sennosides-docusate (PERICOLACE) 8.6-50 MG per tablet 2 tablet  2 tablet Oral BID Paulina Solano MD   2 tablet at 01/12/22 0808    And   • polyethylene glycol (MIRALAX) packet 17 g  17 g Oral Daily PRN Paulina Solano MD        And   • bisacodyl (DULCOLAX) EC tablet 5 mg  5 mg Oral Daily PRN Paulina Solano MD        And   • bisacodyl (DULCOLAX) suppository 10 mg  10 mg Rectal Daily PRN Paulina Solano MD       • bumetanide (BUMEX) tablet 2 mg  2 mg Oral Once per day on Sun Tue Thu Sat Ace Lauren MD   2 mg at 01/16/22 0833   • clopidogrel (PLAVIX) tablet 75 mg  75 mg Oral Daily Paulina Solano MD   75 mg at 01/16/22 0833   • dextrose (D50W) (25 g/50 mL) IV injection 12.5 g  12.5 g Intravenous PRN Paulina Solano MD       • dextrose (D50W) (25 g/50 mL) IV injection 25 g  25 g Intravenous Q15 Min PRN Paulina Solano MD       • dextrose (GLUTOSE) oral gel 15 g  15 g Oral Q15 Min PRN Paulina Solano MD       • gabapentin (NEURONTIN) capsule 300 mg  300 mg Oral Q8H Paulina Solano MD   300 mg at 01/17/22 0506   • glucagon (human recombinant) (GLUCAGEN DIAGNOSTIC) injection 1 mg  1 mg Subcutaneous Q15 Min PRN Paulina Solano MD       • heparin (porcine) 5000 UNIT/ML injection 5,000 Units  5,000 Units Subcutaneous Q8H Paulina Solano MD   5,000  Units at 01/17/22 0506   • heparin (porcine) injection 2,000 Units  2,000 Units Intracatheter PRN Ace Laruen MD   2,000 Units at 01/14/22 0845   • heparin (porcine) injection 4,000 Units  4,000 Units Intracatheter PRN Timothy Valle MD       • hydrALAZINE (APRESOLINE) injection 10 mg  10 mg Intravenous Q6H PRN Paulina Solano MD   10 mg at 01/09/22 1628   • hydrocortisone-bacitracin-zinc oxide-nystatin (MAGIC BARRIER) ointment 1 application  1 application Topical PRN Paulina Solano MD       • insulin aspart (novoLOG) injection 0-24 Units  0-24 Units Subcutaneous TID AC Paulina Solano MD   8 Units at 01/17/22 0820   • insulin aspart (novoLOG) injection 30 Units  30 Units Subcutaneous TID With Meals Paulina Solano MD   30 Units at 01/17/22 0819   • [START ON 1/18/2022] insulin detemir (LEVEMIR) injection 30 Units  30 Units Subcutaneous Daily Luz Quijano MD       • insulin detemir (LEVEMIR) injection 35 Units  35 Units Subcutaneous Nightly Luz Quijano MD       • ipratropium-albuterol (DUO-NEB) nebulizer solution 3 mL  3 mL Nebulization 4x Daily - RT Paulina Solano MD   3 mL at 01/17/22 0839   • isosorbide mononitrate (IMDUR) 24 hr tablet 30 mg  30 mg Oral QAM Paulina Solano MD   30 mg at 01/17/22 0505   • labetalol (NORMODYNE,TRANDATE) injection 20 mg  20 mg Intravenous Q6H PRN Paulina Solano MD   20 mg at 01/09/22 1737   • levothyroxine (SYNTHROID, LEVOTHROID) tablet 25 mcg  25 mcg Oral Daily Paulina Solano MD   25 mcg at 01/16/22 0833   • lidocaine (LIDODERM) 5 % 1 patch  1 patch Transdermal Q24H Luz Quijano MD   1 patch at 01/16/22 0837   • lisinopril (PRINIVIL,ZESTRIL) tablet 5 mg  5 mg Oral Daily Paulina Solano MD   5 mg at 01/16/22 0833   • metoprolol tartrate (LOPRESSOR) tablet 25 mg  25 mg Oral Q12H Ace Lauren MD   25 mg at 01/16/22 2102   • midodrine (PROAMATINE) tablet 5 mg  5 mg Oral Once per day on Mon Wed Fri Ace Lauren MD   5 mg  at 22 0844   • montelukast (SINGULAIR) tablet 10 mg  10 mg Oral Nightly Paulina Solano MD   10 mg at 22   • muscle rub (BenGay) 10-15 % cream 1 application  1 application Topical Q1H PRN Luz Quijano MD   1 application at 22 0525   • nystatin (MYCOSTATIN) powder   Topical Q12H Paulina Solano MD   Given at 22   • O2 (OXYGEN)  3 L/min Inhalation Continuous Paulina Solano ,000 mL/hr at 22 1809 3 L/min at 22 1809   • ondansetron (ZOFRAN) injection 4 mg  4 mg Intravenous Q6H PRN Paulina Solano MD   4 mg at 22 1341   • oxybutynin XL (DITROPAN-XL) 24 hr tablet 5 mg  5 mg Oral Daily Paulina Solano MD   5 mg at 22 0833   • pantoprazole (PROTONIX) EC tablet 40 mg  40 mg Oral Q AM Luz Quijano MD   40 mg at 22 0506   • ranolazine (RANEXA) 12 hr tablet 500 mg  500 mg Oral Q12H Paulina Solano MD   500 mg at 22   • sodium chloride 0.9 % flush 10 mL  10 mL Intravenous PRN Paulina Solano MD       • sodium chloride 0.9 % flush 10 mL  10 mL Intravenous Once PRN Paulina Solano MD       • sodium chloride 0.9 % flush 10 mL  10 mL Intravenous Q12H Paulina Solano MD   10 mL at 22   • sodium chloride 0.9 % flush 10 mL  10 mL Intravenous PRN Paulina Solano MD       • sodium chloride 0.9 % infusion  10 mL/hr Intravenous Continuous PRN Paulina Solano MD            Physician Progress Notes (last 24 hours)      Luz Quijano MD at 22 0735              FAMILY MEDICINE RESIDENCY SERVICE  DAILY PROGRESS NOTE    NAME: Yamileth Slater  : 1960  MRN: [id number]      LOS: 7 days     PROVIDER OF SERVICE: Luz Quijano MD    Chief Complaint: Fall    Subjective:     Interval History:  History taken from: patient chart    Patient was seen awake resting comfortably in bed this AM. No acute overnight events. VSS. Worked with PT/OT yesterday and no signs of knees buckling.  Awaiting SNF placement. Referrals for SNF have been sent out per case management. HD today and nephrology will assess need for catheter replacement.. Denies soa, chest pain, abdominal pain, n/v, dizziness.     Review of Systems:   Review of Systems   Constitutional: Negative.    Respiratory: Negative.    Cardiovascular: Negative.    Gastrointestinal: Negative.    Endocrine: Negative.    Musculoskeletal: Negative for arthralgias and myalgias.   Skin: Negative.    Neurological: Negative.  Negative for headaches.   Psychiatric/Behavioral: Negative.  Negative for suicidal ideas.       Objective:     Vital Signs  Temp:  [97.9 °F (36.6 °C)-98 °F (36.7 °C)] 97.9 °F (36.6 °C)  Heart Rate:  [58-67] 58  Resp:  [18] 18  BP: (149-158)/(67-70) 149/70  Flow (L/min):  [2-3] 2   Body mass index is 47.05 kg/m².    Physical Exam  Physical Exam  Constitutional:       Appearance: Normal appearance. She is normal weight.   HENT:      Head: Normocephalic and atraumatic.      Nose: Nose normal.      Mouth/Throat:      Mouth: Mucous membranes are moist.   Cardiovascular:      Rate and Rhythm: Normal rate and regular rhythm.      Pulses: Normal pulses.      Heart sounds: Normal heart sounds.   Pulmonary:      Effort: Pulmonary effort is normal.      Breath sounds: Normal breath sounds.   Abdominal:      General: Abdomen is flat. Bowel sounds are normal.      Palpations: Abdomen is soft.   Musculoskeletal:         General: Normal range of motion.      Cervical back: Normal range of motion.   Skin:     General: Skin is warm and dry.      Capillary Refill: Capillary refill takes less than 2 seconds.   Neurological:      General: No focal deficit present.      Mental Status: She is alert and oriented to person, place, and time.   Psychiatric:         Mood and Affect: Mood normal.         Behavior: Behavior normal.         Scheduled Meds   aspirin, 81 mg, Oral, Daily  atorvastatin, 20 mg, Oral, Daily  bumetanide, 2 mg, Oral, Once per day on Sun  Tue Thu Sat  clopidogrel, 75 mg, Oral, Daily  gabapentin, 300 mg, Oral, Q8H  heparin (porcine), 5,000 Units, Subcutaneous, Q8H  insulin aspart, 0-24 Units, Subcutaneous, TID AC  insulin aspart, 30 Units, Subcutaneous, TID With Meals  insulin detemir, 30 Units, Subcutaneous, Q12H  ipratropium-albuterol, 3 mL, Nebulization, 4x Daily - RT  isosorbide mononitrate, 30 mg, Oral, QAM  levothyroxine, 25 mcg, Oral, Daily  lidocaine, 1 patch, Transdermal, Q24H  lisinopril, 5 mg, Oral, Daily  metoprolol tartrate, 25 mg, Oral, Q12H  midodrine, 5 mg, Oral, Once per day on Mon Wed Fri  montelukast, 10 mg, Oral, Nightly  nystatin, , Topical, Q12H  oxybutynin XL, 5 mg, Oral, Daily  pantoprazole, 40 mg, Oral, Q AM  ranolazine, 500 mg, Oral, Q12H  senna-docusate sodium, 2 tablet, Oral, BID  sodium chloride, 10 mL, Intravenous, Q12H       PRN Meds   •  acetaminophen **OR** acetaminophen **OR** acetaminophen  •  albumin human  •  albuterol  •  senna-docusate sodium **AND** polyethylene glycol **AND** bisacodyl **AND** bisacodyl  •  dextrose  •  dextrose  •  dextrose  •  glucagon (human recombinant)  •  heparin (porcine)  •  heparin (porcine)  •  hydrALAZINE  •  hydrocortisone-bacitracin-zinc oxide-nystatin  •  labetalol  •  muscle rub  •  ondansetron  •  sodium chloride  •  sodium chloride  •  sodium chloride  •  sodium chloride      Diagnostic Data    Results from last 7 days   Lab Units 01/17/22  0606   WBC 10*3/mm3 6.84   HEMOGLOBIN g/dL 7.5*   HEMATOCRIT % 23.3*   PLATELETS 10*3/mm3 271   GLUCOSE mg/dL 233*   CREATININE mg/dL 7.96*   BUN mg/dL 53*   SODIUM mmol/L 125*   POTASSIUM mmol/L 4.8   ANION GAP mmol/L 15.0       No radiology results for the last day      I reviewed the patient's new clinical results.    Assessment/Plan:     Active Hospital Problems    Diagnosis  POA   • **Fall [W19.XXXA]  No     Priority: High     - witnessed fall without head trauma on 1/13  - No LOC; Patient states her legs gave out  - 1/9: CT head for  AMS which has since resolved.    - 1/13 : Repeat CT of head - No acute intracranial pathology. Unchanged since admission CT    - 1/14: Patient AOx4 on exam, Lidocaine patch/ bengay for buttock discomfort   - 1/15 : patient AO x4   -1/13: Xray pelvis   -No gross displaced fracture evident   - 1/15: Bengay for buttock pain has helped with pain      - MRI unable to be done due to body habitus          • Physical deconditioning [R53.81]  Yes     Priority: High     - PT/OT   - SNF placement pending      • Type 2 diabetes mellitus with kidney complication, with long-term current use of insulin (HCC) [E11.29, Z79.4]  Not Applicable     Priority: High     - Levemir 30units Q12  - Novolog 30 with meals  - SSI  -HgA1c 10/2021 : 8.80       • Personal history of noncompliance with medical treatment, presenting hazards to health [Z91.19]  Not Applicable     Priority: Medium     · Difficulty showing up to clinic visits due to complexity of medical problems and transportation  · Would really benefit from a couple home visits from PCP to help improve compliance       • Anemia due to chronic kidney disease, on chronic dialysis (HCC) [N18.6, D63.1, Z99.2]  Not Applicable     Priority: Medium     - MWF Dialysis patient  - Epoetin (Retacrit) 10,000 units three times a week on dialysis days MWF  - Hg 7 on admission. Transfuse if Hg less than 7   - 1/14: Hg 8   - 1/15: Hg 7.7   - Daily CBC   - Type and screen done   -1/15 : Tunnel Catheter possibly need to be replaced after next HD session on Monday due to poor catheter flow vs loss of function. Will coordinate with nephrology if catheter can be replaced sooner as patient is awaiting SNF placement.            • Unspecified diastolic (congestive) heart failure (HCC) [I50.30]  Yes     Priority: Low     - Continue Imdur, metoprolol, lisinopril, bumex  - pro-BNP 18844 on admission  - Echo 1/10/2022 : EF 61-65%  -Cardiology on board (Dr. Handy) - appreciate recommendations  - MWF Dialysis  patient     • MIKE and COPD overlap syndrome ([facility]) [G47.33, J44.9]  Yes     Priority: Low     · Home Trilogy/CPAP  · Home 3L oxygen dependent. Maintain strict Sats between 88-92%      • Hypothyroidism [E03.9]  Yes     Priority: Low     Continue home Levothyroxine 25mcg, stable     • Coronary artery disease [I25.10]  Yes     Priority: Low     · S/P CABG x 3 (Primary), Bilateral carotid artery stenosis w/ 2 stents on left side,  PAD (peripheral artery disease) with stent in right upper leg  · Continue   - aspirin 81mg   - Plavix 75mg daily  - atorvastatin 20 mg daily  - lisinopril 5mg daily  -lopressor 25mg BID  -Imdur 30mg daily  - Ranexa - 500mg BID   · EKG - sinus tachycardia             • COPD (chronic obstructive pulmonary disease) ([facility]) [J44.9]  Yes     Priority: Low     - Dependent on 3L NC at Home  - Maintain strict sats between 88-92%  -Singulair 10mg   -DuoNebs  -NIPPV via Respiratory. Patient uses Trilogy at home/night      • Hypertension [I10]  Yes     Priority: Low       · Metoprolol 25mg bid, hold if HR < 60bpm  · Lisinopril 5mg daily  · Telemetry  · Hydralazine 10mg prn q6h for SBP > 170  · Labetalol 20mg Q6h >160           DVT prophylaxis: Heparin  Code status is   Code Status and Medical Interventions:   Ordered at: 01/09/22 1508     Level Of Support Discussed With:    Next of Kin (If No Surrogate)     Code Status (Patient has no pulse and is not breathing):    CPR (Attempt to Resuscitate)     Medical Interventions (Patient has pulse or is breathing):    Full Support     Comments:    Discussed with  Huy Slater at bedside       Plan for disposition:Where: SNF and When:  tomorrow pending placement       Time: 15 mins         This document has been electronically signed by Luz Quijano MD on January 17, 2022 07:38 CST        Electronically signed by Luz Quijano MD at 01/17/22 0738     Jm Barrera APRN at 01/16/22 1343     Attestation signed by Timothy Valle MD at 01/16/22 1353    I  "personally evaluated and examined the patient in conjunction with BRIDGETT Malloy and agree with the assessment, treatment plan, and disposition of the patient as recorded.     No acute events overnight.  Denied chest pain or shortness of breath.  Vitals reviewed.  On exam, no lower extreme edema bilaterally.  Lungs clear to auscultation bilaterally.  Labs reviewed.  Received HD on Friday with UF of 2 kg.  Tolerated well.  No HD needs today.  Will schedule for HD tomorrow.      Timothy Valle MD  [facility]                     [facility]  [address] [place] [address]. [phone]  T - [phone]  F - [phone]     Progress Note          PATIENT  DEMOGRAPHICS   PATIENT NAME: Yamileth Sylvester Bryon                      PHYSICIAN: BRIDGETT Hadley  : 1960  MRN: [id number]   LOS: 6 days    Patient Care Team:  Rianna Macias MD as PCP - General (Family Medicine)  Subjective   SUBJECTIVE   No soa         Objective   OBJECTIVE   Vital Signs  Temp:  [96.2 °F (35.7 °C)-97.9 °F (36.6 °C)] 97.9 °F (36.6 °C)  Heart Rate:  [65-68] 66  Resp:  [18-20] 18  BP: (115-151)/(58-68) 122/59    Flowsheet Rows      First Filed Value   Admission Height 167.6 cm (66\") Documented at 2022 1304   Admission Weight 132 kg (291 lb 9.6 oz) Documented at 2022 1048           I/O last 3 completed shifts:  In: 960 [P.O.:960]  Out: 300 [Urine:300]    PHYSICAL EXAM    Physical Exam  Constitutional:       Appearance: She is well-developed.   HENT:      Head: Normocephalic.   Eyes:      Pupils: Pupils are equal, round, and reactive to light.   Cardiovascular:      Rate and Rhythm: Normal rate and regular rhythm.      Heart sounds: Normal heart sounds.   Pulmonary:      Effort: Pulmonary effort is normal.      Breath sounds: Normal breath sounds.   Abdominal:      General: Bowel sounds are normal.      Palpations: Abdomen is soft.   Musculoskeletal:         General: No " swelling.   Skin:     Coloration: Skin is not jaundiced.   Neurological:      Mental Status: She is alert and oriented to person, place, and time.         RESULTS   Results Review:    Results from last 7 days   Lab Units 01/16/22  0701 01/15/22  0719 01/14/22  0621   SODIUM mmol/L 127* 130* 126*   POTASSIUM mmol/L 4.6 4.7 3.8   CHLORIDE mmol/L 91* 93* 91*   CO2 mmol/L 22.0 23.0 17.0*   BUN mg/dL 49* 41* 49*   CREATININE mg/dL 7.77* 7.23* 7.79*   CALCIUM mg/dL 8.5* 8.0* 7.9*   GLUCOSE mg/dL 284* 179* 232*       Estimated Creatinine Clearance: 10.4 mL/min (A) (by C-G formula based on SCr of 7.77 mg/dL (H)).    Results from last 7 days   Lab Units 01/13/22  0621 01/12/22  1950 01/12/22  1634   PHOSPHORUS mg/dL 6.0* 4.1 4.0             Results from last 7 days   Lab Units 01/16/22  0701 01/15/22  0719 01/14/22  0621 01/13/22  0621 01/12/22  0432   WBC 10*3/mm3 6.84 8.68 7.66 9.06 9.07   HEMOGLOBIN g/dL 7.6* 7.7* 8.0* 8.5* 8.6*   PLATELETS 10*3/mm3 266 225 204 220 202               Imaging Results (Last 24 Hours)     ** No results found for the last 24 hours. **           MEDICATIONS    aspirin, 81 mg, Oral, Daily  atorvastatin, 20 mg, Oral, Daily  bumetanide, 2 mg, Oral, Once per day on Sun Tue Thu Sat  clopidogrel, 75 mg, Oral, Daily  gabapentin, 300 mg, Oral, Q8H  heparin (porcine), 5,000 Units, Subcutaneous, Q8H  insulin aspart, 0-24 Units, Subcutaneous, TID AC  insulin aspart, 30 Units, Subcutaneous, TID With Meals  insulin detemir, 30 Units, Subcutaneous, Q12H  ipratropium-albuterol, 3 mL, Nebulization, 4x Daily - RT  isosorbide mononitrate, 30 mg, Oral, QAM  levothyroxine, 25 mcg, Oral, Daily  lidocaine, 1 patch, Transdermal, Q24H  lisinopril, 5 mg, Oral, Daily  metoprolol tartrate, 25 mg, Oral, Q12H  midodrine, 5 mg, Oral, Once per day on Mon Wed Fri  montelukast, 10 mg, Oral, Nightly  nystatin, , Topical, Q12H  oxybutynin XL, 5 mg, Oral, Daily  pantoprazole, 40 mg, Oral, Q AM  ranolazine, 500 mg, Oral,  Q12H  senna-docusate sodium, 2 tablet, Oral, BID  sodium chloride, 10 mL, Intravenous, Q12H      O2, 3 L/min, Last Rate: 3 L/min (01/09/22 1809)  sodium chloride, 10 mL/hr        Assessment/Plan   ASSESSMENT / PLAN      Fall    Hypertension    COPD (chronic obstructive pulmonary disease) (HCC)    Coronary artery disease    Hypothyroidism    MIKE and COPD overlap syndrome (HCC)    Unspecified diastolic (congestive) heart failure (HCC)    Anemia due to chronic kidney disease, on chronic dialysis ([facility])    Personal history of noncompliance with medical treatment, presenting hazards to health    Type 2 diabetes mellitus with kidney complication, with long-term current use of insulin ([facility])    Physical deconditioning    1.  ESRD on HD- Initiated HD on 10/21 due to hyperkalemia and fluid overload, now  ESRD.  HD MWF for now.  Keep bumex 2mg on non dialysis days. Euvolemic at present. Cr much higher this admission pre dialysis than it used to be ?due to poor catheter flow vs loss of function.    -keep midodrine pre dialysis.  If catheter not working next week then needs line exchange, will evaluate after HD Monday     2.  Hyponatremia- in the setting of severe hyperglycemia, hyperosmolar hyponatremia. Now better with glucose correction     3.  HHS-started on insulin drip, managed per primary team. Off insulin drip. On aspart and levemir     4.  History of CAD     5.  History of COPD      6.  Anemia / previous GI bleed / b12 deficiency-  s/p capsule endoscopy which showed few small non-bleeding intestinal AVMs and single small polyp. Hgb slowly dropping.     7.  HTN- Continue home antihypertensives. Metoprolol lowered to 25mg    dispo awaiting snf approval           This document has been electronically signed by BRIDGETT Hadley on January 16, 2022 13:43 CST           Electronically signed by Timothy Valle MD at 01/16/22 1355     Luz Quijano MD at 01/16/22 1228     Attestation signed by Radha Argueta MD at  01/16/22 1534      HD#[id number] y.o. female with a CMH of ESRD on dialysis MWF (since 10/21/2021), CAD s/p CABG x3,  uncontrolled DM type 2, HFpEF, GERD, HTN, HLD, MIKE on CPAP who presents to the ED with AMS and noted HHS.     I was present with the resident during the entire history and physical exam. I discussed the case with the resident.  I have reviewed the notes, assessment and plan, and/or procedures performed by the resident. I concur with the resident’s documentation         Interval events:  Per nursing - pt refusing bed alarm.  > up and ambulating in room to bathroom.  Improvement noted with topical medication to Rt buttocks/upper ext.     Temp:  [96.2 °F (35.7 °C)-97.3 °F (36.3 °C)] 96.9 °F (36.1 °C)  Heart Rate:  [66-73] 66  Resp:  [18-20] 20  BP: (115-151)/(58-68) 151/68     GEN:  Alert X Oriented X4  HEENT:  NCAT  HEART:  S1, S2  LUNGS:  CTA bilaterally  ABD:  + BS, soft, NT, ND  EXT:  No focal ttp over bony prominces, no pitting edema     A/P: 61 y.o. female admitted with:        Active Hospital Problems     Diagnosis   POA   • **Fall [W19.XXXA]   No   • Physical deconditioning [R53.81]   Yes   • Type 2 diabetes mellitus with kidney complication, with long-term current use of insulin (HCC) [E11.29, Z79.4]   Not Applicable   • Personal history of noncompliance with medical treatment, presenting hazards to health [Z91.19]   Not Applicable   • Anemia due to chronic kidney disease, on chronic dialysis (HCC) [N18.6, D63.1, Z99.2]   Not Applicable   • Unspecified diastolic (congestive) heart failure (HCC) [I50.30]   Yes   • MIKE and COPD overlap syndrome (HCC) [G47.33, J44.9]   Yes   • Hypothyroidism [E03.9]   Yes   • Coronary artery disease [I25.10]   Yes   • COPD (chronic obstructive pulmonary disease) (HCC) [J44.9]   Yes   • Hypertension [I10]   Yes       Resolved Hospital Problems     Diagnosis Date Resolved POA   • Diabetic acidosis (HCC) [E11.10] 01/10/2022 Yes   • SIRS (systemic inflammatory response  syndrome) (HCC) [R65.10] 2022 Yes   • Altered mental status [R41.82] 2022 Yes   • CAD (coronary artery disease) [I25.10] 2022 Yes   • Acute cystitis with hematuria [N30.01] 01/15/2022 No   • Type 2 diabetes mellitus with hyperosmolar hyperglycemic state (HHS) (HCC) [E11.00, E11.65] 2022 Yes      PLAN:  Continue current management plan.  Awaiting SNF.  Noted concern for HD catheter- unclear if it would be ideal to plan to replace on Mon. Prior to next HD session and/or if Nephro will need to ensure that new catheter is functional prior to DC.          Signature     This document has been electronically signed by Radha Argueta MD on 2022 07:10 RUST                                          FAMILY MEDICINE RESIDENCY SERVICE  DAILY PROGRESS NOTE    NAME: Yamileth Salter  : 1960  MRN: [id number]      LOS: 6 days     PROVIDER OF SERVICE: Luz Quijano MD    Chief Complaint: Fall    Subjective:     Interval History:  History taken from: patient chart    Patient was seen awake resting comfortably this morning. She is AO x4. Per patient, she got up yesterday evening and walked herself to the bathroom. As she stood in front of the toilet seat she helped herslf to the toilet. She called her nurse to help her back to her bed. Explained to patient she must use her bed alarm if she needs to get up from bed to ensure her safety. She is agreeable at this time. States bengay has helped her buttock pain. Awaiting SNF placement. Dialysis tomorrow.      Review of Systems:   Review of Systems   Constitutional: Negative.    Respiratory: Negative.    Cardiovascular: Negative.    Gastrointestinal: Negative.    Endocrine: Negative.    Musculoskeletal: Negative for arthralgias and myalgias.   Skin: Negative.    Neurological: Negative.  Negative for headaches.   Psychiatric/Behavioral: Negative.  Negative for suicidal ideas.       Objective:     Vital Signs  Temp:  [96.2 °F (35.7 °C)-97.9 °F  (36.6 °C)] 97.9 °F (36.6 °C)  Heart Rate:  [65-68] 66  Resp:  [18-20] 18  BP: (115-151)/(58-68) 122/59  Flow (L/min):  [2-3] 2   Body mass index is 46.44 kg/m².    Physical Exam  Physical Exam  Constitutional:       Appearance: Normal appearance. She is normal weight.   HENT:      Head: Normocephalic and atraumatic.      Nose: Nose normal.      Mouth/Throat:      Mouth: Mucous membranes are moist.   Cardiovascular:      Rate and Rhythm: Normal rate and regular rhythm.      Pulses: Normal pulses.      Heart sounds: Normal heart sounds.   Pulmonary:      Effort: Pulmonary effort is normal.      Breath sounds: Normal breath sounds.   Abdominal:      General: Bowel sounds are normal.      Palpations: Abdomen is soft.   Musculoskeletal:         General: Normal range of motion.      Cervical back: Normal range of motion.   Skin:     General: Skin is warm and dry.      Capillary Refill: Capillary refill takes less than 2 seconds.   Neurological:      General: No focal deficit present.      Mental Status: She is alert and oriented to person, place, and time.   Psychiatric:         Mood and Affect: Mood normal.         Behavior: Behavior normal.         Scheduled Meds   aspirin, 81 mg, Oral, Daily  atorvastatin, 20 mg, Oral, Daily  bumetanide, 2 mg, Oral, Once per day on Sun Tue Thu Sat  clopidogrel, 75 mg, Oral, Daily  gabapentin, 300 mg, Oral, Q8H  heparin (porcine), 5,000 Units, Subcutaneous, Q8H  insulin aspart, 0-24 Units, Subcutaneous, TID AC  insulin aspart, 30 Units, Subcutaneous, TID With Meals  insulin detemir, 30 Units, Subcutaneous, Q12H  ipratropium-albuterol, 3 mL, Nebulization, 4x Daily - RT  isosorbide mononitrate, 30 mg, Oral, QAM  levothyroxine, 25 mcg, Oral, Daily  lidocaine, 1 patch, Transdermal, Q24H  lisinopril, 5 mg, Oral, Daily  metoprolol tartrate, 25 mg, Oral, Q12H  midodrine, 5 mg, Oral, Once per day on Mon Wed Fri  montelukast, 10 mg, Oral, Nightly  nystatin, , Topical, Q12H  oxybutynin XL, 5 mg,  Oral, Daily  pantoprazole, 40 mg, Oral, Q AM  ranolazine, 500 mg, Oral, Q12H  senna-docusate sodium, 2 tablet, Oral, BID  sodium chloride, 10 mL, Intravenous, Q12H       PRN Meds   •  acetaminophen **OR** acetaminophen **OR** acetaminophen  •  albuterol  •  senna-docusate sodium **AND** polyethylene glycol **AND** bisacodyl **AND** bisacodyl  •  dextrose  •  dextrose  •  dextrose  •  glucagon (human recombinant)  •  heparin (porcine)  •  hydrALAZINE  •  hydrocortisone-bacitracin-zinc oxide-nystatin  •  labetalol  •  muscle rub  •  ondansetron  •  sodium chloride  •  sodium chloride  •  sodium chloride  •  sodium chloride      Diagnostic Data    Results from last 7 days   Lab Units 01/16/22  0701   WBC 10*3/mm3 6.84   HEMOGLOBIN g/dL 7.6*   HEMATOCRIT % 23.5*   PLATELETS 10*3/mm3 266   GLUCOSE mg/dL 284*   CREATININE mg/dL 7.77*   BUN mg/dL 49*   SODIUM mmol/L 127*   POTASSIUM mmol/L 4.6   ANION GAP mmol/L 14.0       No radiology results for the last day      I reviewed the patient's new clinical results.    Assessment/Plan:     Active Hospital Problems    Diagnosis  POA   • **Fall [W19.XXXA]  No     Priority: High     - witnessed fall without head trauma on 1/13  - No LOC; Patient states her legs gave out  - 1/9: CT head for AMS which has since resolved.    - 1/13 : Repeat CT of head - No acute intracranial pathology. Unchanged since admission CT    - 1/14: Patient AOx4 on exam, Lidocaine patch/ bengay for buttock discomfort   - 1/15 : patient AO x4   -1/13: Xray pelvis   -No gross displaced fracture evident      - MRI unable to be done due to body habitus          • Physical deconditioning [R53.81]  Yes     Priority: High     - PT/OT   - SNF placement pending      • Type 2 diabetes mellitus with kidney complication, with long-term current use of insulin (HCC) [E11.29, Z79.4]  Not Applicable     Priority: High     - Levemir 30units Q12  - Novolog 30 with meals  - SSI  -HgA1c 10/2021 : 8.80       • Personal history  of noncompliance with medical treatment, presenting hazards to health [Z91.19]  Not Applicable     Priority: Medium     · Difficulty showing up to clinic visits due to complexity of medical problems and transportation  · Would really benefit from a couple home visits from PCP to help improve compliance       • Anemia due to chronic kidney disease, on chronic dialysis (HCC) [N18.6, D63.1, Z99.2]  Not Applicable     Priority: Medium     - MWF Dialysis patient  - Epoetin (Retacrit) 10,000 units three times a week on dialysis days MWF  - Hg 7 on admission. Transfuse if Hg less than 7   - 1/14: Hg 8   - 1/15: Hg 7.7   - Daily CBC   - Type and screen done   -1/15 : Tunnel Catheter possibly need to be replaced after next HD session on Monday due to poor catheter flow vs loss of function. Will coordinate with nephrology if catheter can be replaced sooner as patient is awaiting SNF placement.            • Unspecified diastolic (congestive) heart failure ([facility]) [I50.30]  Yes     Priority: Low     - Continue Imdur, metoprolol, lisinopril, bumex  - pro-BNP 52305 on admission  - Echo 1/10/2022 : EF 61-65%  -Cardiology on board (Dr. Handy) - appreciate recommendations  - MWF Dialysis patient     • MIKE and COPD overlap syndrome ([facility]) [G47.33, J44.9]  Yes     Priority: Low     · Home Trilogy/CPAP  · Home 3L oxygen dependent. Maintain strict Sats between 88-92%      • Hypothyroidism [E03.9]  Yes     Priority: Low     Continue home Levothyroxine 25mcg, stable     • Coronary artery disease [I25.10]  Yes     Priority: Low     · S/P CABG x 3 (Primary), Bilateral carotid artery stenosis w/ 2 stents on left side,  PAD (peripheral artery disease) with stent in right upper leg  · Continue   - aspirin 81mg   - Plavix 75mg daily  - atorvastatin 20 mg daily  - lisinopril 5mg daily  -lopressor 25mg BID  -Imdur 30mg daily  - Ranexa - 500mg BID   · EKG - sinus tachycardia             • COPD (chronic obstructive pulmonary disease) ([facility]) [J44.9]  Yes      Priority: Low     - Dependent on 3L NC at Home  - Maintain strict sats between 88-92%  -Singulair 10mg   -DuoNebs  -NIPPV via Respiratory. Patient uses Trilogy at home/night      • Hypertension [I10]  Yes     Priority: Low       · Metoprolol 25mg bid, hold if HR < 60bpm  · Lisinopril 5mg daily  · Telemetry  · Hydralazine 10mg prn q6h for SBP > 170  · Labetalol 20mg Q6h >160           DVT prophylaxis: Heparin  Code status is   Code Status and Medical Interventions:   Ordered at: 01/09/22 1508     Level Of Support Discussed With:    Next of Kin (If No Surrogate)     Code Status (Patient has no pulse and is not breathing):    CPR (Attempt to Resuscitate)     Medical Interventions (Patient has pulse or is breathing):    Full Support     Comments:    Discussed with  Huy Slater at bedside       Plan for disposition:Where: SNF and When:  tomorrow pending placement       Time: 20 mins         This document has been electronically signed by Luz Quijano MD on January 16, 2022 12:32 CST        Electronically signed by Radha Argueta MD at 01/16/22 1534       Medical Student Notes (last 24 hours)  Notes from 01/16/22 0928 through 01/17/22 0928   No notes of this type exist for this encounter.         Consult Notes (last 24 hours)  Notes from 01/16/22 0928 through 01/17/22 0928   No notes of this type exist for this encounter.

## 2022-01-17 NOTE — PLAN OF CARE
Goal Outcome Evaluation:  Plan of Care Reviewed With: patient        Progress: improving  Outcome Summary: pt is pleasant and agreeable to PT. pt requires min A for sup-sit-sup. CGA-min A for sit-stand and t/fs. pt participated in gait training, 6, 6, 30, 30 ft CGA w/ RW. tx ended as dialysis nurse arrived. no new goals met at this time. pt would continue to benefit from PT services.

## 2022-01-17 NOTE — PROGRESS NOTES
FAMILY MEDICINE RESIDENCY SERVICE  DAILY PROGRESS NOTE    NAME: Yamileth Slater  : 1960  MRN: [id number]      LOS: 7 days     PROVIDER OF SERVICE: Luz Quijano MD    Chief Complaint: Fall    Subjective:     Interval History:  History taken from: patient chart    Patient was seen awake resting comfortably in bed this AM. No acute overnight events. VSS. Worked with PT/OT yesterday and no signs of knees buckling. Awaiting SNF placement. Referrals for SNF have been sent out per case management. HD today and nephrology will assess need for catheter replacement.. Denies soa, chest pain, abdominal pain, n/v, dizziness.     Review of Systems:   Review of Systems   Constitutional: Negative.    Respiratory: Negative.    Cardiovascular: Negative.    Gastrointestinal: Negative.    Endocrine: Negative.    Musculoskeletal: Negative for arthralgias and myalgias.   Skin: Negative.    Neurological: Negative.  Negative for headaches.   Psychiatric/Behavioral: Negative.  Negative for suicidal ideas.       Objective:     Vital Signs  Temp:  [97.9 °F (36.6 °C)-98 °F (36.7 °C)] 97.9 °F (36.6 °C)  Heart Rate:  [58-67] 58  Resp:  [18] 18  BP: (149-158)/(67-70) 149/70  Flow (L/min):  [2-3] 2   Body mass index is 47.05 kg/m².    Physical Exam  Physical Exam  Constitutional:       Appearance: Normal appearance. She is normal weight.   HENT:      Head: Normocephalic and atraumatic.      Nose: Nose normal.      Mouth/Throat:      Mouth: Mucous membranes are moist.   Cardiovascular:      Rate and Rhythm: Normal rate and regular rhythm.      Pulses: Normal pulses.      Heart sounds: Normal heart sounds.   Pulmonary:      Effort: Pulmonary effort is normal.      Breath sounds: Normal breath sounds.   Abdominal:      General: Abdomen is flat. Bowel sounds are normal.      Palpations: Abdomen is soft.   Musculoskeletal:         General: Normal range of motion.      Cervical back: Normal range of motion.   Skin:     General: Skin is  warm and dry.      Capillary Refill: Capillary refill takes less than 2 seconds.   Neurological:      General: No focal deficit present.      Mental Status: She is alert and oriented to person, place, and time.   Psychiatric:         Mood and Affect: Mood normal.         Behavior: Behavior normal.         Scheduled Meds   aspirin, 81 mg, Oral, Daily  atorvastatin, 20 mg, Oral, Daily  bumetanide, 2 mg, Oral, Once per day on Sun Tue Thu Sat  clopidogrel, 75 mg, Oral, Daily  gabapentin, 300 mg, Oral, Q8H  heparin (porcine), 5,000 Units, Subcutaneous, Q8H  insulin aspart, 0-24 Units, Subcutaneous, TID AC  insulin aspart, 30 Units, Subcutaneous, TID With Meals  insulin detemir, 30 Units, Subcutaneous, Q12H  ipratropium-albuterol, 3 mL, Nebulization, 4x Daily - RT  isosorbide mononitrate, 30 mg, Oral, QAM  levothyroxine, 25 mcg, Oral, Daily  lidocaine, 1 patch, Transdermal, Q24H  lisinopril, 5 mg, Oral, Daily  metoprolol tartrate, 25 mg, Oral, Q12H  midodrine, 5 mg, Oral, Once per day on Mon Wed Fri  montelukast, 10 mg, Oral, Nightly  nystatin, , Topical, Q12H  oxybutynin XL, 5 mg, Oral, Daily  pantoprazole, 40 mg, Oral, Q AM  ranolazine, 500 mg, Oral, Q12H  senna-docusate sodium, 2 tablet, Oral, BID  sodium chloride, 10 mL, Intravenous, Q12H       PRN Meds   •  acetaminophen **OR** acetaminophen **OR** acetaminophen  •  albumin human  •  albuterol  •  senna-docusate sodium **AND** polyethylene glycol **AND** bisacodyl **AND** bisacodyl  •  dextrose  •  dextrose  •  dextrose  •  glucagon (human recombinant)  •  heparin (porcine)  •  heparin (porcine)  •  hydrALAZINE  •  hydrocortisone-bacitracin-zinc oxide-nystatin  •  labetalol  •  muscle rub  •  ondansetron  •  sodium chloride  •  sodium chloride  •  sodium chloride  •  sodium chloride      Diagnostic Data    Results from last 7 days   Lab Units 01/17/22  0606   WBC 10*3/mm3 6.84   HEMOGLOBIN g/dL 7.5*   HEMATOCRIT % 23.3*   PLATELETS 10*3/mm3 271   GLUCOSE mg/dL 233*    CREATININE mg/dL 7.96*   BUN mg/dL 53*   SODIUM mmol/L 125*   POTASSIUM mmol/L 4.8   ANION GAP mmol/L 15.0       No radiology results for the last day      I reviewed the patient's new clinical results.    Assessment/Plan:     Active Hospital Problems    Diagnosis  POA   • **Fall [W19.XXXA]  No     Priority: High     - witnessed fall without head trauma on 1/13  - No LOC; Patient states her legs gave out  - 1/9: CT head for AMS which has since resolved.    - 1/13 : Repeat CT of head - No acute intracranial pathology. Unchanged since admission CT    - 1/14: Patient AOx4 on exam, Lidocaine patch/ bengay for buttock discomfort   - 1/15 : patient AO x4   -1/13: Xray pelvis   -No gross displaced fracture evident   - 1/15: Bengay for buttock pain has helped with pain      - MRI unable to be done due to body habitus          • Physical deconditioning [R53.81]  Yes     Priority: High     - PT/OT   - SNF placement pending      • Type 2 diabetes mellitus with kidney complication, with long-term current use of insulin ([facility]) [E11.29, Z79.4]  Not Applicable     Priority: High     - Levemir 30units Q12  - Novolog 30 with meals  - SSI  -HgA1c 10/2021 : 8.80       • Personal history of noncompliance with medical treatment, presenting hazards to health [Z91.19]  Not Applicable     Priority: Medium     · Difficulty showing up to clinic visits due to complexity of medical problems and transportation  · Would really benefit from a couple home visits from PCP to help improve compliance       • Anemia due to chronic kidney disease, on chronic dialysis (HCC) [N18.6, D63.1, Z99.2]  Not Applicable     Priority: Medium     - MWF Dialysis patient  - Epoetin (Retacrit) 10,000 units three times a week on dialysis days MWF  - Hg 7 on admission. Transfuse if Hg less than 7   - 1/14: Hg 8   - 1/15: Hg 7.7   - Daily CBC   - Type and screen done   -1/15 : Tunnel Catheter possibly need to be replaced after next HD session on Monday due to poor  catheter flow vs loss of function. Will coordinate with nephrology if catheter can be replaced sooner as patient is awaiting SNF placement.            • Unspecified diastolic (congestive) heart failure (HCC) [I50.30]  Yes     Priority: Low     - Continue Imdur, metoprolol, lisinopril, bumex  - pro-BNP 84593 on admission  - Echo 1/10/2022 : EF 61-65%  -Cardiology on board (Dr. Handy) - appreciate recommendations  - MWF Dialysis patient     • MIKE and COPD overlap syndrome ([facility]) [G47.33, J44.9]  Yes     Priority: Low     · Home Trilogy/CPAP  · Home 3L oxygen dependent. Maintain strict Sats between 88-92%      • Hypothyroidism [E03.9]  Yes     Priority: Low     Continue home Levothyroxine 25mcg, stable     • Coronary artery disease [I25.10]  Yes     Priority: Low     · S/P CABG x 3 (Primary), Bilateral carotid artery stenosis w/ 2 stents on left side,  PAD (peripheral artery disease) with stent in right upper leg  · Continue   - aspirin 81mg   - Plavix 75mg daily  - atorvastatin 20 mg daily  - lisinopril 5mg daily  -lopressor 25mg BID  -Imdur 30mg daily  - Ranexa - 500mg BID   · EKG - sinus tachycardia             • COPD (chronic obstructive pulmonary disease) ([facility]) [J44.9]  Yes     Priority: Low     - Dependent on 3L NC at Home  - Maintain strict sats between 88-92%  -Singulair 10mg   -DuoNebs  -NIPPV via Respiratory. Patient uses Trilogy at home/night      • Hypertension [I10]  Yes     Priority: Low       · Metoprolol 25mg bid, hold if HR < 60bpm  · Lisinopril 5mg daily  · Telemetry  · Hydralazine 10mg prn q6h for SBP > 170  · Labetalol 20mg Q6h >160           DVT prophylaxis: Heparin  Code status is   Code Status and Medical Interventions:   Ordered at: 01/09/22 1508     Level Of Support Discussed With:    Next of Kin (If No Surrogate)     Code Status (Patient has no pulse and is not breathing):    CPR (Attempt to Resuscitate)     Medical Interventions (Patient has pulse or is breathing):    Full Support     Comments:     Discussed with  Huy Slater at bedside       Plan for disposition:Where: SNF and When:  tomorrow pending placement       Time: 15 mins         This document has been electronically signed by Luz Quijano MD on January 17, 2022 07:38 CST

## 2022-01-17 NOTE — THERAPY TREATMENT NOTE
Patient Name: Yamileth Slater  : 1960    MRN: [id number]                              Today's Date: 2022       Admit Date: 2022    Visit Dx:     ICD-10-CM ICD-9-CM   1. Diabetic ketoacidosis with coma associated with other specified diabetes mellitus ([facility])  E13.11 250.32   2. Hypertension, unspecified type  I10 401.9   3. Altered mental status, unspecified altered mental status type  R41.82 780.97   4. Dysphagia, unspecified type  R13.10 787.20   5. Impaired functional mobility, balance, gait, and endurance  Z74.09 V49.89   6. Impaired mobility and ADLs  Z74.09 V49.89    Z78.9      Patient Active Problem List   Diagnosis   • Chronic obstructive pulmonary disease (HCC)   • Chronic midline low back pain without sciatica   • Vitamin D deficiency   • Tobacco dependence syndrome   • Surgical follow-up care   • Shoulder joint pain   • Peripheral vascular disease ([facility])   • Pain   • Neurologic disorder associated with diabetes mellitus ([facility])   • Morbid obesity with BMI of 50.0-59.9, adult ([facility])   • Mixed hyperlipidemia   • Kidney stone   • History of colon polyps   • GERD (gastroesophageal reflux disease)   • Excoriated eczema   • Hypertension   • Encounter for medication refill   • Dyslipidemia   • Diabetes mellitus ([facility])   • COPD (chronic obstructive pulmonary disease) ([facility])   • Chronic folliculitis   • Coronary artery disease   • Mild persistent asthma   • 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   • Fall   • Hypothyroidism   • Carotid artery disease ([facility])   • Current smoker   • [place] abuse   • PAD (peripheral artery disease) ([facility])   • Intercostal pain   • Venous insufficiency   • LAFB (left anterior fascicular block)   • Pulmonary hypertension ([facility])   • Left hip pain   • Neck pain   • CKD (chronic kidney disease), symptom management only, stage 5 ([facility])   • MIKE and COPD overlap syndrome ([facility])   • Pneumonia due to COVID-19 virus   • Hyperkalemia   • Cutaneous candidiasis   •  Community acquired pneumonia of right middle lobe of lung   • MRSA infection   • Alkaline phosphatase elevation   • COVID-19 ruled out by laboratory testing   • Esophageal dysphagia   • Monilial esophagitis ([facility])   • NSTEMI, initial episode of care ([facility])   • Pneumonia due to infectious organism   • Cellulitis of left leg   • Unspecified diastolic (congestive) heart failure ([facility])   • Hyponatremia   • Urinary tract infection due to Proteus   • History of insertion of tunneled central venous catheter (CVC) with port   • Anemia due to chronic kidney disease, on chronic dialysis ([facility])   • Pneumonitis   • Personal history of noncompliance with medical treatment, presenting hazards to health   • Type 2 diabetes mellitus with kidney complication, with long-term current use of insulin ([facility])   • Physical deconditioning     Past Medical History:   Diagnosis Date   • Acute blood loss anemia 4/16/2017    Likely due to gastric oozing at this time. - Dr. Duarte (GI) was consulted and has now signed off, will follow up outpatient - pill colonoscopy showed AVMs - continue to monitor   • Acute cystitis with hematuria 3/31/2021    1/13: IV Rocephin 1 gm q 24 1/14 : transitioned  to omnicef 300mg. Urine cultures resulted and did not show growth. Omnicef discontinued as patient is asymptomatic   • Altered mental status 1/9/2022    - AMS on presentation - initial ABG pH 7.3, CO2 34 - Procal 0.29 - UA negative for acute cysitits -CTA head wnl  - Empiric Zosyn and Vancomycin -Lactate 2.5 on admission  - blood cultures no growth at 24 hours     • Anxiety    • CAD (coronary artery disease) 4/24/2021    S/P 3 stents 5/1/2021 for [facility] Continue ASA 81mg & Clopidogrel 75mg Continue Atorvastatin 40mg   • Carotid artery stenosis    • Chronic obstructive lung disease (HCC)    • CKD (chronic kidney disease) stage 4, GFR 15-29 ml/min ([facility])    • Colonic polyp    • Coronary arteriosclerosis    • Diabetes mellitus ([facility])    • Diabetic neuropathy ([facility])     • Ear pain, right 10/18/2021    - canal trauma due to patient scratching and DMT2 - added cortisporin ear drops   • Elevated troponin 10/12/2021    -most likely from CKD -Trending down -Neg chest pain   • GERD (gastroesophageal reflux disease)    • GI bleed 5/13/2021    - GI will follow up outpatient - Protonix 40mg daily - Avoid medical DVT prophy and use mechanical at this time instead. - Continue to monitor - pill colonoscopy results showed AVMs   • History of transfusion    • Hypercholesterolemia    • Hypertension    • Hypomagnesemia 6/27/2021    Monitor and replace   • Morbid obesity (HCC)    • Nephrolithiasis    • Peripheral vascular disease ([facility])    • SIRS (systemic inflammatory response syndrome) ([facility]) 1/9/2022    Admission  - WBC 17.78   -   - RR 16 - 1/10: VSS/wnl - CXR - Mild pulm edema - Blood cultures no growth at 24 hours  - Procalcitonin 0.29 - UA : glucose 1000, negative Leucocytes/nitrate - Empiric Zosyn and Vancomcyin    • Sleep apnea    • Substance abuse ([facility])    • Vitamin D deficiency      Past Surgical History:   Procedure Laterality Date   • CARDIAC CATHETERIZATION N/A 7/14/2020   • CARDIAC CATHETERIZATION N/A 4/23/2021    Procedure: Left Heart Cath;  Surgeon: Melba Romo MD;  Location: [facility] CATH INVASIVE LOCATION;  Service: Cardiology;  Laterality: N/A;   • CARDIAC CATHETERIZATION N/A 4/30/2021    Procedure: Percutaneous Coronary Intervention;  Surgeon: Russell Voss MD;  Location: [facility] CATH INVASIVE LOCATION;  Service: Cardiovascular;  Laterality: N/A;   • CARDIAC CATHETERIZATION N/A 4/30/2021    Procedure: Stent NIKKI coronary;  Surgeon: Russell Voss MD;  Location: [facility] CATH INVASIVE LOCATION;  Service: Cardiovascular;  Laterality: N/A;   • CARDIAC CATHETERIZATION Left 11/13/2021    Procedure: Left Heart Cath;  Surgeon: Niall Rios MD;  Location: [facility] CATH INVASIVE LOCATION;  Service: Cardiology;  Laterality: Left;   • CAROTID STENT Left    •  COLONOSCOPY     • COLONOSCOPY N/A 5/14/2021    Procedure: COLONOSCOPY;  Surgeon: Mingo Duarte MD;  Location: [facility] ENDOSCOPY;  Service: Gastroenterology;  Laterality: N/A;   • CORONARY ARTERY BYPASS GRAFT N/A 2013    CABG X 3   • CYSTOSCOPY BLADDER STONE LITHOTRIPSY Bilateral    • ENDOSCOPY N/A 4/12/2021    Procedure: ESOPHAGOGASTRODUODENOSCOPY;  Surgeon: Mingo Duarte MD;  Location: [facility] ENDOSCOPY;  Service: Gastroenterology;  Laterality: N/A;   • ENDOSCOPY N/A 5/14/2021    Procedure: ESOPHAGOGASTRODUODENOSCOPY;  Surgeon: Mingo Duarte MD;  Location: [facility] ENDOSCOPY;  Service: Gastroenterology;  Laterality: N/A;   • INTERVENTIONAL RADIOLOGY PROCEDURE N/A 10/21/2021    Procedure: tunneled central venous catheter placement;  Surgeon: Donnie Robles MD;  Location: [facility] ANGIO INVASIVE LOCATION;  Service: Interventional Radiology;  Laterality: N/A;      General Information     Row Name 01/17/22 1059          OT Time and Intention    Document Type therapy note (daily note)  -CS     Mode of Treatment occupational therapy  -CS     Row Name 01/17/22 1059          General Information    Patient Profile Reviewed yes  -CS     Existing Precautions/Restrictions fall  -CS     Row Name 01/17/22 1059          Cognition    Orientation Status (Cognition) oriented x 3  -CS     Row Name 01/17/22 1059          Safety Issues, Functional Mobility    Impairments Affecting Function (Mobility) balance; coordination; endurance/activity tolerance; strength; shortness of breath  -CS           User Key  (r) = Recorded By, (t) = Taken By, (c) = Cosigned By    Initials Name Provider Type    CS May Montez COTA Occupational Therapy Assistant                 Mobility/ADL's     Row Name 01/17/22 1059          Bed Mobility    Supine-Sit Rum[place] (Bed Mobility) standby assist  -CS     Sit-Supine [place] (Bed Mobility) standby assist  -CS     Assistive Device (Bed Mobility) bed rails; head of bed elevated;  draw sheet  -     Row Name 01/17/22 1059          Activities of Daily Living    BADL Assessment/Intervention grooming  -     Row Name 01/17/22 1059          Grooming Assessment/Training    Jal Level (Grooming) grooming skills; hair care, combing/brushing; oral care regimen; wash face, hands; other (see comments)  shampoo cap  -     Position (Grooming) edge of bed sitting  -           User Key  (r) = Recorded By, (t) = Taken By, (c) = Cosigned By    Initials Name Provider Type     May Montez COTA Occupational Therapy Assistant               Obj/Interventions     Row Name 01/17/22 1059          Shoulder (Therapeutic Exercise)    Shoulder (Therapeutic Exercise) AROM (active range of motion)  -     Shoulder AROM (Therapeutic Exercise) bilateral; flexion; extension; external rotation; internal rotation  -     Row Name 01/17/22 1059          Elbow/Forearm (Therapeutic Exercise)    Elbow/Forearm (Therapeutic Exercise) AROM (active range of motion)  -     Elbow/Forearm AROM (Therapeutic Exercise) bilateral; flexion; extension; supination; pronation  -[facility] Name 01/17/22 1059          Wrist (Therapeutic Exercise)    Wrist (Therapeutic Exercise) AROM (active range of motion)  -     Wrist AROM (Therapeutic Exercise) bilateral; flexion; extension  -     Row Name 01/17/22 1059          Hand (Therapeutic Exercise)    Hand (Therapeutic Exercise) AROM (active range of motion)  -     Hand AROM/AAROM (Therapeutic Exercise) bilateral; finger flexion; finger extension  -     Row Name 01/17/22 1059          Therapeutic Exercise    Therapeutic Exercise shoulder; elbow/forearm; wrist; hand  -           User Key  (r) = Recorded By, (t) = Taken By, (c) = Cosigned By    Initials Name Provider Type    May Del Valle COTA Occupational Therapy Assistant               Goals/Plan     Row Name 01/17/22 1059          Transfer Goal 1 (OT)    Activity/Assistive Device (Transfer Goal 1, OT) toilet   -CS     [place] Level/Cues Needed (Transfer Goal 1, OT) standby assist  -CS     Time Frame (Transfer Goal 1, OT) long term goal (LTG); by discharge  -CS     Progress/Outcome (Transfer Goal 1, OT) goal not met  -CS     Row Name 01/17/22 1059          Bathing Goal 1 (OT)    Activity/Device (Bathing Goal 1, OT) lower body bathing  -CS     [place] Level/Cues Needed (Bathing Goal 1, OT) minimum assist (75% or more patient effort)  -CS     Time Frame (Bathing Goal 1, OT) long term goal (LTG); by discharge  -CS     Progress/Outcomes (Bathing Goal 1, OT) goal not met  -CS     Row Name 01/17/22 1059          Dressing Goal 1 (OT)    Activity/Device (Dressing Goal 1, OT) lower body dressing  -CS     [place]/Cues Needed (Dressing Goal 1, OT) minimum assist (75% or more patient effort)  -CS     Time Frame (Dressing Goal 1, OT) long term goal (LTG); by discharge  -CS     Progress/Outcome (Dressing Goal 1, OT) goal not met  -CS     Row Name 01/17/22 1059          Toileting Goal 1 (OT)    Activity/Device (Toileting Goal 1, OT) toileting skills, all  -CS     [place] Level/Cues Needed (Toileting Goal 1, OT) minimum assist (75% or more patient effort)  -CS     Time Frame (Toileting Goal 1, OT) long term goal (LTG); by discharge  -CS     Progress/Outcome (Toileting Goal 1, OT) goal not met  -CS           User Key  (r) = Recorded By, (t) = Taken By, (c) = Cosigned By    Initials Name Provider Type    CS May Montez COTA Occupational Therapy Assistant               Clinical Impression     Row Name 01/17/22 1052          Pain Scale: Numbers Pre/Post-Treatment    Pretreatment Pain Rating 0/10 - no pain  -CS     Posttreatment Pain Rating 0/10 - no pain  -CS     Row Name 01/17/22 1052          Plan of Care Review    Plan of Care Reviewed With patient  -CS     Progress improving  -CS     Outcome Summary Pt was SBA with bed mobility. Pt was set with grooming task. Pt gave good effort with UE ther ex. Discussed home  safety/EC. Continue OT POC.  -CS     Row Name 01/17/22 1059          Therapy Assessment/Plan (OT)    Rehab Potential (OT) good, to achieve stated therapy goals  -CS     Criteria for Skilled Therapeutic Interventions Met (OT) yes; skilled treatment is necessary  -CS     Row Name 01/17/22 1059          Therapy Plan Review/Discharge Plan (OT)    Anticipated Discharge Disposition (OT) skilled nursing facility  -     Row Name 01/17/22 1059          Vital Signs    Pre Patient Position Supine  -CS     Intra Patient Position Sitting  -CS     Post Patient Position Sitting  -CS     Row Name 01/17/22 1059          Positioning and Restraints    Pre-Treatment Position in bed  -CS     Post Treatment Position bed  -CS     In Bed sitting EOB; call light within reach; encouraged to call for assist; exit alarm on  -CS           User Key  (r) = Recorded By, (t) = Taken By, (c) = Cosigned By    Initials Name Provider Type    CS May Montez COTA Occupational Therapy Assistant               Outcome Measures    No documentation.                 Occupational Therapy Education                 Title: PT OT SLP Therapies (In Progress)     Topic: Occupational Therapy (In Progress)     Point: ADL training (Not Started)     Description:   Instruct learner(s) on proper safety adaptation and remediation techniques during self care or transfers.   Instruct in proper use of assistive devices.              Learner Progress:  Not documented in this visit.          Point: Home exercise program (Not Started)     Description:   Instruct learner(s) on appropriate technique for monitoring, assisting and/or progressing therapeutic exercises/activities.              Learner Progress:  Not documented in this visit.          Point: Precautions (Done)     Description:   Instruct learner(s) on prescribed precautions during self-care and functional transfers.              Learning Progress Summary           Patient Acceptance, E,TB, VU by  at 1/12/2022  1553    Comment: POC, role of OT, transfer training   Family Acceptance, E,TB, VU by  at 1/12/2022 1553    Comment: POC, role of OT, transfer training                   Point: Body mechanics (Done)     Description:   Instruct learner(s) on proper positioning and spine alignment during self-care, functional mobility activities and/or exercises.              Learning Progress Summary           Patient Acceptance, E,TB, VU by  at 1/12/2022 1553    Comment: POC, role of OT, transfer training   Family Acceptance, E,TB, VU by  at 1/12/2022 1553    Comment: POC, role of OT, transfer training                               User Key     Initials Effective Dates Name Provider Type Discipline     06/14/21 -  Ottoniel Robles OT Occupational Therapist OT              OT Recommendation and Plan     Plan of Care Review  Plan of Care Reviewed With: patient  Progress: improving  Outcome Summary: Pt was SBA with bed mobility. Pt was set with grooming task. Pt gave good effort with UE ther ex. Discussed home safety/EC. Continue OT POC.     Time Calculation:    Time Calculation- OT     Row Name 01/17/22 1401             Time Calculation- OT    OT Start Time 1059  -CS      OT Stop Time 1158  -CS      OT Time Calculation (min) 59 min  -CS      Total Timed Code Minutes- OT 59 minute(s)  -CS      OT Received On 01/17/22  -CS              Timed Charges    41859 - OT Therapeutic Exercise Minutes 30  -CS      45233 - OT Self Care/Mgmt Minutes 29  -CS              Total Minutes    Timed Charges Total Minutes 59  -CS       Total Minutes 59  -CS            User Key  (r) = Recorded By, (t) = Taken By, (c) = Cosigned By    Initials Name Provider Type    CS May Montez COTA Occupational Therapy Assistant              Therapy Charges for Today     Code Description Service Date Service Provider Modifiers Qty    05871551854 HC OT THER PROC EA 15 MIN 1/17/2022 May Montez COTA GO 2    [id number] HC OT SELF CARE/MGMT/TRAIN EA 15  MIN 1/17/2022 May Montez COTA GO 2               ISHAN Smith  1/17/2022

## 2022-01-17 NOTE — PLAN OF CARE
Goal Outcome Evaluation:           Progress: improving  Outcome Summary: pt resting well. vss. no signs of distress.

## 2022-01-18 LAB
ANION GAP SERPL CALCULATED.3IONS-SCNC: 15 MMOL/L (ref 5–15)
BUN SERPL-MCNC: 40 MG/DL (ref 8–23)
BUN/CREAT SERPL: 6.8 (ref 7–25)
CALCIUM SPEC-SCNC: 8.8 MG/DL (ref 8.6–10.5)
CHLORIDE SERPL-SCNC: 90 MMOL/L (ref 98–107)
CO2 SERPL-SCNC: 22 MMOL/L (ref 22–29)
CREAT SERPL-MCNC: 5.88 MG/DL (ref 0.57–1)
DEPRECATED RDW RBC AUTO: 44.9 FL (ref 37–54)
ERYTHROCYTE [DISTWIDTH] IN BLOOD BY AUTOMATED COUNT: 14.3 % (ref 12.3–15.4)
GFR SERPL CREATININE-BSD FRML MDRD: 7 ML/MIN/1.73
GFR SERPL CREATININE-BSD FRML MDRD: ABNORMAL ML/MIN/{1.73_M2}
GLUCOSE BLDC GLUCOMTR-MCNC: 142 MG/DL (ref 70–130)
GLUCOSE BLDC GLUCOMTR-MCNC: 226 MG/DL (ref 70–130)
GLUCOSE BLDC GLUCOMTR-MCNC: 277 MG/DL (ref 70–130)
GLUCOSE BLDC GLUCOMTR-MCNC: 73 MG/DL (ref 70–130)
GLUCOSE SERPL-MCNC: 221 MG/DL (ref 65–99)
HCT VFR BLD AUTO: 25.2 % (ref 34–46.6)
HGB BLD-MCNC: 8.1 G/DL (ref 12–15.9)
MCH RBC QN AUTO: 27.7 PG (ref 26.6–33)
MCHC RBC AUTO-ENTMCNC: 32.1 G/DL (ref 31.5–35.7)
MCV RBC AUTO: 86.3 FL (ref 79–97)
PLATELET # BLD AUTO: 351 10*3/MM3 (ref 140–450)
PMV BLD AUTO: 9 FL (ref 6–12)
POTASSIUM SERPL-SCNC: 5.1 MMOL/L (ref 3.5–5.2)
RBC # BLD AUTO: 2.92 10*6/MM3 (ref 3.77–5.28)
SODIUM SERPL-SCNC: 127 MMOL/L (ref 136–145)
WBC NRBC COR # BLD: 6.73 10*3/MM3 (ref 3.4–10.8)

## 2022-01-18 PROCEDURE — 97535 SELF CARE MNGMENT TRAINING: CPT

## 2022-01-18 PROCEDURE — 82962 GLUCOSE BLOOD TEST: CPT

## 2022-01-18 PROCEDURE — 97110 THERAPEUTIC EXERCISES: CPT

## 2022-01-18 PROCEDURE — 63710000001 INSULIN ASPART PER 5 UNITS: Performed by: STUDENT IN AN ORGANIZED HEALTH CARE EDUCATION/TRAINING PROGRAM

## 2022-01-18 PROCEDURE — 99231 SBSQ HOSP IP/OBS SF/LOW 25: CPT | Performed by: STUDENT IN AN ORGANIZED HEALTH CARE EDUCATION/TRAINING PROGRAM

## 2022-01-18 PROCEDURE — 63710000001 INSULIN DETEMIR PER 5 UNITS: Performed by: STUDENT IN AN ORGANIZED HEALTH CARE EDUCATION/TRAINING PROGRAM

## 2022-01-18 PROCEDURE — 25010000002 HEPARIN (PORCINE) PER 1000 UNITS: Performed by: STUDENT IN AN ORGANIZED HEALTH CARE EDUCATION/TRAINING PROGRAM

## 2022-01-18 PROCEDURE — 85027 COMPLETE CBC AUTOMATED: CPT | Performed by: STUDENT IN AN ORGANIZED HEALTH CARE EDUCATION/TRAINING PROGRAM

## 2022-01-18 PROCEDURE — 80048 BASIC METABOLIC PNL TOTAL CA: CPT | Performed by: STUDENT IN AN ORGANIZED HEALTH CARE EDUCATION/TRAINING PROGRAM

## 2022-01-18 PROCEDURE — 94799 UNLISTED PULMONARY SVC/PX: CPT

## 2022-01-18 RX ORDER — OXYBUTYNIN CHLORIDE 5 MG/1
5 TABLET, EXTENDED RELEASE ORAL NIGHTLY
Status: DISCONTINUED | OUTPATIENT
Start: 2022-01-19 | End: 2022-01-20 | Stop reason: HOSPADM

## 2022-01-18 RX ORDER — NYSTATIN 100000 [USP'U]/G
POWDER TOPICAL EVERY 12 HOURS SCHEDULED
Status: DISCONTINUED | OUTPATIENT
Start: 2022-01-18 | End: 2022-01-18

## 2022-01-18 RX ORDER — NYSTATIN 100000 [USP'U]/G
POWDER TOPICAL EVERY 12 HOURS SCHEDULED
Status: DISCONTINUED | OUTPATIENT
Start: 2022-01-18 | End: 2022-01-20 | Stop reason: HOSPADM

## 2022-01-18 RX ORDER — HYDROCODONE BITARTRATE AND ACETAMINOPHEN 5; 325 MG/1; MG/1
1 TABLET ORAL EVERY 6 HOURS PRN
Status: DISCONTINUED | OUTPATIENT
Start: 2022-01-18 | End: 2022-01-20 | Stop reason: HOSPADM

## 2022-01-18 RX ADMIN — GABAPENTIN 300 MG: 300 CAPSULE ORAL at 06:13

## 2022-01-18 RX ADMIN — GABAPENTIN 300 MG: 300 CAPSULE ORAL at 20:50

## 2022-01-18 RX ADMIN — SODIUM CHLORIDE, PRESERVATIVE FREE 10 ML: 5 INJECTION INTRAVENOUS at 09:16

## 2022-01-18 RX ADMIN — ATORVASTATIN CALCIUM 20 MG: 20 TABLET, FILM COATED ORAL at 09:02

## 2022-01-18 RX ADMIN — INSULIN ASPART 30 UNITS: 100 INJECTION, SOLUTION INTRAVENOUS; SUBCUTANEOUS at 09:04

## 2022-01-18 RX ADMIN — HYDROCODONE BITARTRATE AND ACETAMINOPHEN 1 TABLET: 5; 325 TABLET ORAL at 12:28

## 2022-01-18 RX ADMIN — SODIUM CHLORIDE, PRESERVATIVE FREE 10 ML: 5 INJECTION INTRAVENOUS at 20:51

## 2022-01-18 RX ADMIN — IPRATROPIUM BROMIDE AND ALBUTEROL SULFATE 3 ML: 2.5; .5 SOLUTION RESPIRATORY (INHALATION) at 14:38

## 2022-01-18 RX ADMIN — HEPARIN SODIUM 5000 UNITS: 5000 INJECTION INTRAVENOUS; SUBCUTANEOUS at 06:13

## 2022-01-18 RX ADMIN — IPRATROPIUM BROMIDE AND ALBUTEROL SULFATE 3 ML: 2.5; .5 SOLUTION RESPIRATORY (INHALATION) at 21:04

## 2022-01-18 RX ADMIN — ASPIRIN 81 MG: 81 TABLET, FILM COATED ORAL at 09:02

## 2022-01-18 RX ADMIN — BUMETANIDE 2 MG: 1 TABLET ORAL at 09:02

## 2022-01-18 RX ADMIN — ACETAMINOPHEN 650 MG: 325 TABLET, FILM COATED ORAL at 09:13

## 2022-01-18 RX ADMIN — LIDOCAINE 1 PATCH: 50 PATCH CUTANEOUS at 09:13

## 2022-01-18 RX ADMIN — MONTELUKAST 10 MG: 10 TABLET, FILM COATED ORAL at 20:50

## 2022-01-18 RX ADMIN — LISINOPRIL 5 MG: 5 TABLET ORAL at 09:12

## 2022-01-18 RX ADMIN — HEPARIN SODIUM 5000 UNITS: 5000 INJECTION INTRAVENOUS; SUBCUTANEOUS at 14:14

## 2022-01-18 RX ADMIN — RANOLAZINE 500 MG: 500 TABLET, FILM COATED, EXTENDED RELEASE ORAL at 20:50

## 2022-01-18 RX ADMIN — OXYBUTYNIN CHLORIDE 5 MG: 5 TABLET, EXTENDED RELEASE ORAL at 09:15

## 2022-01-18 RX ADMIN — ZINC OXIDE 1 APPLICATION: 200 OINTMENT TOPICAL at 20:53

## 2022-01-18 RX ADMIN — DOCUSATE SODIUM 50 MG AND SENNOSIDES 8.6 MG 2 TABLET: 8.6; 5 TABLET, FILM COATED ORAL at 20:50

## 2022-01-18 RX ADMIN — IPRATROPIUM BROMIDE AND ALBUTEROL SULFATE 3 ML: 2.5; .5 SOLUTION RESPIRATORY (INHALATION) at 11:03

## 2022-01-18 RX ADMIN — METOPROLOL TARTRATE 25 MG: 25 TABLET, FILM COATED ORAL at 20:50

## 2022-01-18 RX ADMIN — NYSTATIN: 100000 POWDER TOPICAL at 20:53

## 2022-01-18 RX ADMIN — INSULIN ASPART 8 UNITS: 100 INJECTION, SOLUTION INTRAVENOUS; SUBCUTANEOUS at 09:03

## 2022-01-18 RX ADMIN — NYSTATIN: 100000 POWDER TOPICAL at 09:07

## 2022-01-18 RX ADMIN — METOPROLOL TARTRATE 25 MG: 25 TABLET, FILM COATED ORAL at 09:12

## 2022-01-18 RX ADMIN — INSULIN DETEMIR 30 UNITS: 100 INJECTION, SOLUTION SUBCUTANEOUS at 09:18

## 2022-01-18 RX ADMIN — CLOPIDOGREL 75 MG: 75 TABLET, FILM COATED ORAL at 09:04

## 2022-01-18 RX ADMIN — LEVOTHYROXINE SODIUM 25 MCG: 25 TABLET ORAL at 10:00

## 2022-01-18 RX ADMIN — RANOLAZINE 500 MG: 500 TABLET, FILM COATED, EXTENDED RELEASE ORAL at 09:12

## 2022-01-18 RX ADMIN — ISOSORBIDE MONONITRATE 30 MG: 30 TABLET, EXTENDED RELEASE ORAL at 06:13

## 2022-01-18 RX ADMIN — HEPARIN SODIUM 5000 UNITS: 5000 INJECTION INTRAVENOUS; SUBCUTANEOUS at 20:51

## 2022-01-18 RX ADMIN — INSULIN ASPART 12 UNITS: 100 INJECTION, SOLUTION INTRAVENOUS; SUBCUTANEOUS at 12:29

## 2022-01-18 RX ADMIN — INSULIN ASPART 30 UNITS: 100 INJECTION, SOLUTION INTRAVENOUS; SUBCUTANEOUS at 12:28

## 2022-01-18 RX ADMIN — PANTOPRAZOLE SODIUM 40 MG: 40 TABLET, DELAYED RELEASE ORAL at 06:13

## 2022-01-18 RX ADMIN — HYDROCODONE BITARTRATE AND ACETAMINOPHEN 1 TABLET: 5; 325 TABLET ORAL at 20:51

## 2022-01-18 RX ADMIN — DOCUSATE SODIUM 50 MG AND SENNOSIDES 8.6 MG 2 TABLET: 8.6; 5 TABLET, FILM COATED ORAL at 09:12

## 2022-01-18 RX ADMIN — MENTHOL, METHYL SALICYLATE 1 APPLICATION: 10; 15 CREAM TOPICAL at 09:06

## 2022-01-18 RX ADMIN — GABAPENTIN 300 MG: 300 CAPSULE ORAL at 14:15

## 2022-01-18 RX ADMIN — IPRATROPIUM BROMIDE AND ALBUTEROL SULFATE 3 ML: 2.5; .5 SOLUTION RESPIRATORY (INHALATION) at 07:00

## 2022-01-18 NOTE — THERAPY TREATMENT NOTE
Patient Name: Yamileth Slater  : 1960    MRN: [id number]                              Today's Date: 2022       Admit Date: 2022    Visit Dx:     ICD-10-CM ICD-9-CM   1. Diabetic ketoacidosis with coma associated with other specified diabetes mellitus ([facility])  E13.11 250.32   2. Hypertension, unspecified type  I10 401.9   3. Altered mental status, unspecified altered mental status type  R41.82 780.97   4. Dysphagia, unspecified type  R13.10 787.20   5. Impaired functional mobility, balance, gait, and endurance  Z74.09 V49.89   6. Impaired mobility and ADLs  Z74.09 V49.89    Z78.9      Patient Active Problem List   Diagnosis   • Chronic obstructive pulmonary disease (HCC)   • Chronic midline low back pain without sciatica   • Vitamin D deficiency   • Tobacco dependence syndrome   • Surgical follow-up care   • Shoulder joint pain   • Peripheral vascular disease ([facility])   • Pain   • Neurologic disorder associated with diabetes mellitus ([facility])   • Morbid obesity with BMI of 50.0-59.9, adult ([facility])   • Mixed hyperlipidemia   • Kidney stone   • History of colon polyps   • GERD (gastroesophageal reflux disease)   • Excoriated eczema   • Hypertension   • Encounter for medication refill   • Dyslipidemia   • Diabetes mellitus ([facility])   • COPD (chronic obstructive pulmonary disease) ([facility])   • Chronic folliculitis   • Coronary artery disease   • Mild persistent asthma   • 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   • Fall   • Hypothyroidism   • Carotid artery disease ([facility])   • Current smoker   • Marihuana abuse   • PAD (peripheral artery disease) ([facility])   • Intercostal pain   • Venous insufficiency   • LAFB (left anterior fascicular block)   • Pulmonary hypertension ([facility])   • Left hip pain   • Neck pain   • CKD (chronic kidney disease), symptom management only, stage 5 ([facility])   • MIKE and COPD overlap syndrome ([facility])   • Pneumonia due to COVID-19 virus   • Hyperkalemia   • Cutaneous candidiasis   •  Community acquired pneumonia of right middle lobe of lung   • MRSA infection   • Alkaline phosphatase elevation   • COVID-19 ruled out by laboratory testing   • Esophageal dysphagia   • Monilial esophagitis ([facility])   • NSTEMI, initial episode of care ([facility])   • Pneumonia due to infectious organism   • Cellulitis of left leg   • Unspecified diastolic (congestive) heart failure ([facility])   • Hyponatremia   • Urinary tract infection due to Proteus   • History of insertion of tunneled central venous catheter (CVC) with port   • Anemia due to chronic kidney disease, on chronic dialysis ([facility])   • Pneumonitis   • Personal history of noncompliance with medical treatment, presenting hazards to health   • Type 2 diabetes mellitus with kidney complication, with long-term current use of insulin ([facility])   • Physical deconditioning     Past Medical History:   Diagnosis Date   • Acute blood loss anemia 4/16/2017    Likely due to gastric oozing at this time. - Dr. Duarte (GI) was consulted and has now signed off, will follow up outpatient - pill colonoscopy showed AVMs - continue to monitor   • Acute cystitis with hematuria 3/31/2021    1/13: IV Rocephin 1 gm q 24 1/14 : transitioned  to omnicef 300mg. Urine cultures resulted and did not show growth. Omnicef discontinued as patient is asymptomatic   • Altered mental status 1/9/2022    - AMS on presentation - initial ABG pH 7.3, CO2 34 - Procal 0.29 - UA negative for acute cysitits -CTA head wnl  - Empiric Zosyn and Vancomycin -Lactate 2.5 on admission  - blood cultures no growth at 24 hours     • Anxiety    • CAD (coronary artery disease) 4/24/2021    S/P 3 stents 5/1/2021 for [facility] Continue ASA 81mg & Clopidogrel 75mg Continue Atorvastatin 40mg   • Carotid artery stenosis    • Chronic obstructive lung disease (HCC)    • CKD (chronic kidney disease) stage 4, GFR 15-29 ml/min ([facility])    • Colonic polyp    • Coronary arteriosclerosis    • Diabetes mellitus ([facility])    • Diabetic neuropathy ([facility])     • Ear pain, right 10/18/2021    - canal trauma due to patient scratching and DMT2 - added cortisporin ear drops   • Elevated troponin 10/12/2021    -most likely from CKD -Trending down -Neg chest pain   • GERD (gastroesophageal reflux disease)    • GI bleed 5/13/2021    - GI will follow up outpatient - Protonix 40mg daily - Avoid medical DVT prophy and use mechanical at this time instead. - Continue to monitor - pill colonoscopy results showed AVMs   • History of transfusion    • Hypercholesterolemia    • Hypertension    • Hypomagnesemia 6/27/2021    Monitor and replace   • Morbid obesity (HCC)    • Nephrolithiasis    • Peripheral vascular disease ([facility])    • SIRS (systemic inflammatory response syndrome) ([facility]) 1/9/2022    Admission  - WBC 17.78   -   - RR 16 - 1/10: VSS/wnl - CXR - Mild pulm edema - Blood cultures no growth at 24 hours  - Procalcitonin 0.29 - UA : glucose 1000, negative Leucocytes/nitrate - Empiric Zosyn and Vancomcyin    • Sleep apnea    • Substance abuse ([facility])    • Vitamin D deficiency      Past Surgical History:   Procedure Laterality Date   • CARDIAC CATHETERIZATION N/A 7/14/2020   • CARDIAC CATHETERIZATION N/A 4/23/2021    Procedure: Left Heart Cath;  Surgeon: Melba Romo MD;  Location: [facility] CATH INVASIVE LOCATION;  Service: Cardiology;  Laterality: N/A;   • CARDIAC CATHETERIZATION N/A 4/30/2021    Procedure: Percutaneous Coronary Intervention;  Surgeon: Russell Voss MD;  Location: [facility] CATH INVASIVE LOCATION;  Service: Cardiovascular;  Laterality: N/A;   • CARDIAC CATHETERIZATION N/A 4/30/2021    Procedure: Stent NIKKI coronary;  Surgeon: Russell Voss MD;  Location: [facility] CATH INVASIVE LOCATION;  Service: Cardiovascular;  Laterality: N/A;   • CARDIAC CATHETERIZATION Left 11/13/2021    Procedure: Left Heart Cath;  Surgeon: Niall Rios MD;  Location: [facility] CATH INVASIVE LOCATION;  Service: Cardiology;  Laterality: Left;   • CAROTID STENT Left    •  COLONOSCOPY     • COLONOSCOPY N/A 5/14/2021    Procedure: COLONOSCOPY;  Surgeon: Mingo Duarte MD;  Location: [facility] ENDOSCOPY;  Service: Gastroenterology;  Laterality: N/A;   • CORONARY ARTERY BYPASS GRAFT N/A 2013    CABG X 3   • CYSTOSCOPY BLADDER STONE LITHOTRIPSY Bilateral    • ENDOSCOPY N/A 4/12/2021    Procedure: ESOPHAGOGASTRODUODENOSCOPY;  Surgeon: Mingo Duarte MD;  Location: [facility] ENDOSCOPY;  Service: Gastroenterology;  Laterality: N/A;   • ENDOSCOPY N/A 5/14/2021    Procedure: ESOPHAGOGASTRODUODENOSCOPY;  Surgeon: Mingo Duarte MD;  Location: [facility] ENDOSCOPY;  Service: Gastroenterology;  Laterality: N/A;   • INTERVENTIONAL RADIOLOGY PROCEDURE N/A 10/21/2021    Procedure: tunneled central venous catheter placement;  Surgeon: Donnie Robles MD;  Location: [facility] ANGIO INVASIVE LOCATION;  Service: Interventional Radiology;  Laterality: N/A;      General Information     Row Name 01/18/22 0956          OT Time and Intention    Document Type therapy note (daily note)  -CS     Mode of Treatment occupational therapy  -CS     Row Name 01/18/22 0956          General Information    Patient Profile Reviewed yes  -CS     Existing Precautions/Restrictions fall  -CS     Row Name 01/18/22 0956          Cognition    Orientation Status (Cognition) oriented x 3  -CS     Row Name 01/18/22 0956          Safety Issues, Functional Mobility    Impairments Affecting Function (Mobility) balance; coordination; endurance/activity tolerance; strength; shortness of breath  -CS           User Key  (r) = Recorded By, (t) = Taken By, (c) = Cosigned By    Initials Name Provider Type    CS May Montez COTA Occupational Therapy Assistant                 Mobility/ADL's     Row Name 01/18/22 0956          Bed Mobility    Supine-Sit Vance (Bed Mobility) supervision  -CS     Sit-Supine Vance (Bed Mobility) supervision  -CS     Assistive Device (Bed Mobility) bed rails; head of bed elevated  -CS      [facility] Name 01/18/22 0956          Transfers    Transfers sit-stand transfer; toilet transfer  -     [place] Level (Toilet Transfer) contact guard; minimum assist (75% patient effort)  -     Assistive Device (Toilet Transfer) walker, front-wheeled  -     Row Name 01/18/22 0956          Toilet Transfer    Type (Toilet Transfer) stand-sit; sit-stand  -[facility] Name 01/18/22 0956          Functional Mobility    Functional Mobility- Ind. Level contact guard assist  -     Functional Mobility- Device rolling walker  -     Functional Mobility-Distance (Feet) 10  -     Row Name 01/18/22 0956          Activities of Daily Living    BADL Assessment/Intervention grooming; toileting  -[facility] Name 01/18/22 0956          Grooming Assessment/Training    [place]ia Level (Grooming) grooming skills; hair care, combing/brushing; wash face, hands; set up  -     Position (Grooming) edge of bed sitting  -[facility] Name 01/18/22 0956          Toileting Assessment/Training    [place]ia Level (Toileting) toileting skills; perform perineal hygiene; standby assist  -     Assistive Devices (Toileting) commode; grab bar/safety frame  -           User Key  (r) = Recorded By, (t) = Taken By, (c) = Cosigned By    Initials Name Provider Type     May Montez COTA Occupational Therapy Assistant               Obj/Interventions     [facility] Name 01/18/22 0956          Shoulder (Therapeutic Exercise)    Shoulder (Therapeutic Exercise) AROM (active range of motion)  -     Shoulder AROM (Therapeutic Exercise) bilateral; flexion; extension; external rotation; internal rotation  -[facility] Name 01/18/22 0956          Elbow/Forearm (Therapeutic Exercise)    Elbow/Forearm (Therapeutic Exercise) AROM (active range of motion)  -     Elbow/Forearm AROM (Therapeutic Exercise) bilateral; flexion; extension; supination; pronation  -     Row Name 01/18/22 0956          Wrist (Therapeutic Exercise)    Wrist (Therapeutic  Exercise) AROM (active range of motion)  -     Wrist AROM (Therapeutic Exercise) bilateral; flexion; extension  -     Row Name 01/18/22 0956          Hand (Therapeutic Exercise)    Hand (Therapeutic Exercise) AROM (active range of motion)  -     Hand AROM/AAROM (Therapeutic Exercise) bilateral; finger flexion; finger extension  -     Row Name 01/18/22 0956          Therapeutic Exercise    Therapeutic Exercise shoulder; elbow/forearm; wrist; hand  -CS           User Key  (r) = Recorded By, (t) = Taken By, (c) = Cosigned By    Initials Name Provider Type    CS May Montez COTA Occupational Therapy Assistant               Goals/Plan     Row Name 01/18/22 0956          Transfer Goal 1 (OT)    Activity/Assistive Device (Transfer Goal 1, OT) toilet  -CS     [place] Level/Cues Needed (Transfer Goal 1, OT) standby assist  -CS     Time Frame (Transfer Goal 1, OT) long term goal (LTG); by discharge  -CS     Progress/Outcome (Transfer Goal 1, OT) goal not met  -     Row Name 01/18/22 0956          Bathing Goal 1 (OT)    Activity/Device (Bathing Goal 1, OT) lower body bathing  -CS     [place] Level/Cues Needed (Bathing Goal 1, OT) minimum assist (75% or more patient effort)  -CS     Time Frame (Bathing Goal 1, OT) long term goal (LTG); by discharge  -CS     Progress/Outcomes (Bathing Goal 1, OT) goal not met  -     Row Name 01/18/22 0956          Dressing Goal 1 (OT)    Activity/Device (Dressing Goal 1, OT) lower body dressing  -CS     [place]/Cues Needed (Dressing Goal 1, OT) minimum assist (75% or more patient effort)  -CS     Time Frame (Dressing Goal 1, OT) long term goal (LTG); by discharge  -CS     Progress/Outcome (Dressing Goal 1, OT) goal not met  -     Row Name 01/18/22 0956          Toileting Goal 1 (OT)    Activity/Device (Toileting Goal 1, OT) toileting skills, all  -CS     [place] Level/Cues Needed (Toileting Goal 1, OT) minimum assist (75% or more patient effort)  -CS      Time Frame (Toileting Goal 1, OT) long term goal (LTG); by discharge  -CS     Progress/Outcome (Toileting Goal 1, OT) goal not met  -CS           User Key  (r) = Recorded By, (t) = Taken By, (c) = Cosigned By    Initials Name Provider Type    May Del Valle COTA Occupational Therapy Assistant               Clinical Impression     Row Name 01/18/22 0956          Pain Scale: Numbers Pre/Post-Treatment    Pretreatment Pain Rating 5/10  -CS     Posttreatment Pain Rating 5/10  -CS     Pain Location other (see comments)  Buttock  -CS     Row Name 01/18/22 0956          Plan of Care Review    Plan of Care Reviewed With patient  -CS     Progress improving  -CS     Outcome Summary Pt tolerated tx well this date. Pt was SBA with bed mobility. Pt was CGA/Min A with toilet t/f. Pt was set up with grooming task. Pt gave good effort with UE ther ex. Continue OT POC.  -CS     Row Name 01/18/22 0956          Therapy Assessment/Plan (OT)    Rehab Potential (OT) good, to achieve stated therapy goals  -CS     Criteria for Skilled Therapeutic Interventions Met (OT) yes; skilled treatment is necessary  -CS     Row Name 01/18/22 0956          Therapy Plan Review/Discharge Plan (OT)    Anticipated Discharge Disposition (OT) skilled nursing facility  -     Row Name 01/18/22 0956          Vital Signs    Pre Patient Position Supine  -CS     Intra Patient Position Sitting  -CS     Post Patient Position Supine  -CS     Row Name 01/18/22 0956          Positioning and Restraints    Pre-Treatment Position in bed  -CS     Post Treatment Position bed  -CS     In Bed supine; call light within reach; encouraged to call for assist; exit alarm on  -CS           User Key  (r) = Recorded By, (t) = Taken By, (c) = Cosigned By    Initials Name Provider Type    May Del Valle COTA Occupational Therapy Assistant               Outcome Measures    No documentation.                 Occupational Therapy Education                 Title: PT OT SLP  Therapies (In Progress)     Topic: Occupational Therapy (In Progress)     Point: ADL training (Not Started)     Description:   Instruct learner(s) on proper safety adaptation and remediation techniques during self care or transfers.   Instruct in proper use of assistive devices.              Learner Progress:  Not documented in this visit.          Point: Home exercise program (Not Started)     Description:   Instruct learner(s) on appropriate technique for monitoring, assisting and/or progressing therapeutic exercises/activities.              Learner Progress:  Not documented in this visit.          Point: Precautions (Done)     Description:   Instruct learner(s) on prescribed precautions during self-care and functional transfers.              Learning Progress Summary           Patient Acceptance, E,TB, VU by  at 1/12/2022 1553    Comment: POC, role of OT, transfer training   Family Acceptance, E,TB, VU by  at 1/12/2022 1553    Comment: POC, role of OT, transfer training                   Point: Body mechanics (Done)     Description:   Instruct learner(s) on proper positioning and spine alignment during self-care, functional mobility activities and/or exercises.              Learning Progress Summary           Patient Acceptance, E,TB, VU by  at 1/12/2022 1553    Comment: POC, role of OT, transfer training   Family Acceptance, E,TB, VU by  at 1/12/2022 1553    Comment: POC, role of OT, transfer training                               User Key     Initials Effective Dates Name Provider Type Discipline     06/14/21 -  Ottoniel Robles OT Occupational Therapist OT              OT Recommendation and Plan     Plan of Care Review  Plan of Care Reviewed With: patient  Progress: improving  Outcome Summary: Pt tolerated tx well this date. Pt was SBA with bed mobility. Pt was CGA/Min A with toilet t/f. Pt was set up with grooming task. Pt gave good effort with UE ther ex. Continue OT POC.     Time Calculation:     Time Calculation- OT     Row Name 01/18/22 1317             Time Calculation- OT    OT Start Time 0956  -CS      OT Stop Time 1053  -CS      OT Time Calculation (min) 57 min  -CS      Total Timed Code Minutes- OT 57 minute(s)  -CS      OT Received On 01/18/22  -CS              Timed Charges    89151 - OT Therapeutic Exercise Minutes 30  -CS      55876 - OT Self Care/Mgmt Minutes 27  -CS              Total Minutes    Timed Charges Total Minutes 57  -CS       Total Minutes 57  -CS            User Key  (r) = Recorded By, (t) = Taken By, (c) = Cosigned By    Initials Name Provider Type    CS May Montez COTA Occupational Therapy Assistant              Therapy Charges for Today     Code Description Service Date Service Provider Modifiers Qty    45231661826 HC OT THER PROC EA 15 MIN 1/17/2022 May Montez COTA GO 2    90912805895 HC OT SELF CARE/MGMT/TRAIN EA 15 MIN 1/17/2022 May Montez COTA GO 2    31810825533 HC OT THER PROC EA 15 MIN 1/18/2022 May Montez COTA GO 2    58839385351 HC OT SELF CARE/MGMT/TRAIN EA 15 MIN 1/18/2022 May Montez COTA GO 2               ISHAN Smith  1/18/2022

## 2022-01-18 NOTE — PLAN OF CARE
Goal Outcome Evaluation:      Pt has done good today.she feels helpless after finding out about the nursing home situation.have asked for some creams for her coccyx and abd folds.will cont to monitor

## 2022-01-18 NOTE — CONSULTS
Nutrition Services    Patient Name:  Yamileth Slater  YOB: 1960  MRN: [id number]  Admit Date:  1/9/2022    Pt claims that she is eating well.  Weak and her tail bone is hurting from a recent fall.  She is suppose to be going to rehab soon. No questions about her diet but when she gets home she will try to make needed changes.  Pt received dialysis yesterday and catheter worked.  Levemir increased in pm for better glucose control.  SNP placement pending due to physical deconditioning.    Labs:  Na 127; Gluc 241/244/158/226; bun 40; Creta 5.88  Meds: Bumex; Epoetin; SSI; Novolog with meals; Levemir;   Intake 96% average  Will continue to monitor POC  Electronically signed by:  Gissel Littlejohn RD  01/18/22 14:47 CST

## 2022-01-18 NOTE — DISCHARGE PLACEMENT REQUEST
"Yamileth Slater (61 y.o. Female)             Date of Birth Social Security Number Address Home Phone MRN    1960  [address]  [place] [address] [phone] [id number]    Restoration Marital Status             None        Admission Date Admission Type Admitting Provider Attending Provider Department, Room/Bed    1/9/22 Emergency Kit Brar MD McNevin, James, MD [address], 382/1    Discharge Date Discharge Disposition Discharge Destination                         Attending Provider: Huy Santa MD    Allergies: Adhesive Tape, Other    Isolation: Contact   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)   Code Status: CPR   Advance Care Planning Activity    Ht: 167.6 cm (65.98\")   Wt: 130 kg (285 lb 9.6 oz)    Admission Cmt: None   Principal Problem: Fall [W19.XXXA] More...                 Active Insurance as of 1/9/2022     Primary Coverage     Payor Plan Insurance Group Employer/Plan Group    ANTHEM MEDICARE REPLACEMENT ANTHEM MEDICARE ADVANTAGE [id number]     Payor Plan Address Payor Plan Phone Number Payor Plan Fax Number Effective Dates    [address] [phone]  1/1/2022 - None Entered    [facility] [address]       Subscriber Name Subscriber Birth Date Member ID       YAMILETH SLATER 1960 [id number]           Secondary Coverage     Payor Plan Insurance Group Employer/Plan Group    [place]      Payor Plan Address Payor Plan Phone Number Payor Plan Fax Number Effective Dates    [address] [phone]  6/28/2019 - None Entered    [facility] [address]       Subscriber Name Subscriber Birth Date Member ID       YAMILETH SLATER 1960 [id number]                 Emergency Contacts      (Rel.) Home Phone Work Phone Mobile Phone    Yamileth Chavez (Daughter) [phone] -- [phone]    MiguelvamshihectorJjSuzanne (Daughter) [phone] -- [phone]    Huy Slater (Spouse) [phone] -- " [phone]            Emergency Contact Information     Name Relation Home Work Mobile    Yamileth Chavez Daughter [phone]    Suzanne Rivera Daughter [phone]    Huy Slater Spouse [phone]          Insurance Information                [facility] MEDICARE REPLACEMENT/[facility] MEDICARE ADVANTAGE Phone: [phone]    Subscriber: Yamileth Slater Subscriber#: [id number]    Group#: [id number] Precert#: --        [place] Phone: [phone]    Subscriber: Yamileth Slater Subscriber#: [id number]    Group#: -- Precert#: --

## 2022-01-18 NOTE — PROGRESS NOTES
FAMILY MEDICINE RESIDENCY SERVICE  DAILY PROGRESS NOTE    NAME: Yamileth Slater  : 1960  MRN: [id number]      LOS: 8 days     PROVIDER OF SERVICE: Luz Quijano MD    Chief Complaint: Fall    Subjective:     Interval History:  History taken from: patient chart    Patient was seen awake and resting this AM. She received HD yesterday and catheter worked appropriately. Replacement is not necessary at this time. Increased Levemir PM dose to 35 units for tighter control of glucose readings. Patient previously was at [facility] however due to insurance change she understands she will not be going to [facility] and Is agreeable to a different SNF. Denies soa, chest pain, abdominal pain, n/v, dizziness.        Review of Systems:   Review of Systems   Constitutional: Negative.    Respiratory: Negative.    Cardiovascular: Negative.    Gastrointestinal: Negative.    Endocrine: Negative.    Musculoskeletal: Negative for arthralgias and myalgias.   Skin: Negative.    Neurological: Negative.  Negative for headaches.   Psychiatric/Behavioral: Negative.  Negative for suicidal ideas.       Objective:     Vital Signs  Temp:  [96.1 °F (35.6 °C)-97.8 °F (36.6 °C)] 96.1 °F (35.6 °C)  Heart Rate:  [58-88] 62  Resp:  [16-22] 18  BP: (100-153)/(60-74) 131/60  Flow (L/min):  [3] 3   Body mass index is 46.12 kg/m².    Physical Exam  Physical Exam  Constitutional:       Appearance: Normal appearance. She is obese.   HENT:      Head: Normocephalic and atraumatic.      Nose: Nose normal.      Mouth/Throat:      Mouth: Mucous membranes are moist.   Cardiovascular:      Rate and Rhythm: Normal rate and regular rhythm.      Pulses: Normal pulses.      Heart sounds: Normal heart sounds.   Pulmonary:      Effort: Pulmonary effort is normal.      Breath sounds: Normal breath sounds.   Abdominal:      General: Abdomen is flat. Bowel sounds are normal.      Palpations: Abdomen is soft.   Musculoskeletal:         General: Normal range of motion.       Cervical back: Normal range of motion.   Skin:     General: Skin is warm and dry.      Capillary Refill: Capillary refill takes less than 2 seconds.   Neurological:      General: No focal deficit present.      Mental Status: She is alert and oriented to person, place, and time.   Psychiatric:         Mood and Affect: Mood normal.         Behavior: Behavior normal.         Scheduled Meds   aspirin, 81 mg, Oral, Daily  atorvastatin, 20 mg, Oral, Daily  bumetanide, 2 mg, Oral, Once per day on Sun Tue Thu Sat  clopidogrel, 75 mg, Oral, Daily  epoetin hubert/hubert-epbx, 10,000 Units, Intravenous, Once per day on Mon Wed Fri  gabapentin, 300 mg, Oral, Q8H  heparin (porcine), 5,000 Units, Subcutaneous, Q8H  insulin aspart, 0-24 Units, Subcutaneous, TID AC  insulin aspart, 30 Units, Subcutaneous, TID With Meals  insulin detemir, 30 Units, Subcutaneous, Daily  insulin detemir, 35 Units, Subcutaneous, Nightly  ipratropium-albuterol, 3 mL, Nebulization, 4x Daily - RT  isosorbide mononitrate, 30 mg, Oral, QAM  levothyroxine, 25 mcg, Oral, Daily  lidocaine, 1 patch, Transdermal, Q24H  lisinopril, 5 mg, Oral, Daily  metoprolol tartrate, 25 mg, Oral, Q12H  midodrine, 5 mg, Oral, Once per day on Mon Wed Fri  montelukast, 10 mg, Oral, Nightly  nystatin, , Topical, Q12H  oxybutynin XL, 5 mg, Oral, Daily  pantoprazole, 40 mg, Oral, Q AM  ranolazine, 500 mg, Oral, Q12H  senna-docusate sodium, 2 tablet, Oral, BID  sodium chloride, 10 mL, Intravenous, Q12H       PRN Meds   •  acetaminophen **OR** acetaminophen **OR** acetaminophen  •  albumin human  •  albuterol  •  senna-docusate sodium **AND** polyethylene glycol **AND** bisacodyl **AND** bisacodyl  •  dextrose  •  dextrose  •  dextrose  •  glucagon (human recombinant)  •  heparin (porcine)  •  heparin (porcine)  •  hydrALAZINE  •  hydrocortisone-bacitracin-zinc oxide-nystatin  •  labetalol  •  muscle rub  •  ondansetron  •  sodium chloride  •  sodium chloride  •  sodium chloride  •   sodium chloride      Diagnostic Data    Results from last 7 days   Lab Units 01/18/22  0623   WBC 10*3/mm3 6.73   HEMOGLOBIN g/dL 8.1*   HEMATOCRIT % 25.2*   PLATELETS 10*3/mm3 351   GLUCOSE mg/dL 221*   CREATININE mg/dL 5.88*   BUN mg/dL 40*   SODIUM mmol/L 127*   POTASSIUM mmol/L 5.1   ANION GAP mmol/L 15.0       No radiology results for the last day      I reviewed the patient's new clinical results.    Assessment/Plan:     Active Hospital Problems    Diagnosis  POA   • **Fall [W19.XXXA]  No     Priority: High     - witnessed fall without head trauma on 1/13  - No LOC; Patient states her legs gave out  - 1/9: CT head for AMS which has since resolved.    - 1/13 : Repeat CT of head - No acute intracranial pathology. Unchanged since admission CT    - 1/14: Patient AOx4 on exam, Lidocaine patch/ bengay for buttock discomfort   - 1/15 : patient AO x4   -1/13: Xray pelvis   -No gross displaced fracture evident   - 1/15: Bengay for buttock pain has helped with pain      - MRI unable to be done due to body habitus          • Physical deconditioning [R53.81]  Yes     Priority: High     - PT/OT   - SNF placement pending      • Type 2 diabetes mellitus with kidney complication, with long-term current use of insulin (HCC) [E11.29, Z79.4]  Not Applicable     Priority: High     - Levemir 30units AM. 35 units PM  - Novolog 30 with meals  - SSI  -HgA1c 10/2021 : 8.80       • Personal history of noncompliance with medical treatment, presenting hazards to health [Z91.19]  Not Applicable     Priority: Medium     · Difficulty showing up to clinic visits due to complexity of medical problems and transportation  · Would really benefit from a couple home visits from PCP to help improve compliance       • Anemia due to chronic kidney disease, on chronic dialysis (HCC) [N18.6, D63.1, Z99.2]  Not Applicable     Priority: Medium     - MWF Dialysis patient - Nephrology following   - Epoetin (Retacrit) 10,000 units three times a week on  dialysis days MWF  - Hg 7 on admission. Transfuse if Hg less than 7   - 1/14: Hg 8   - 1/15: Hg 7.7   - Daily CBC   - Type and screen done   -1/15 : Tunnel Catheter possibly need to be replaced after next HD session on Monday due to poor catheter flow vs loss of function. Will coordinate with nephrology if catheter can be replaced sooner as patient is awaiting SNF placement.            • Unspecified diastolic (congestive) heart failure (HCC) [I50.30]  Yes     Priority: Low     - Continue Imdur, metoprolol, lisinopril, bumex  - pro-BNP 04847 on admission  - Echo 1/10/2022 : EF 61-65%  -Cardiology on board (Dr. Handy) - appreciate recommendations  - MWF Dialysis patient     • MIKE and COPD overlap syndrome ([facility]) [G47.33, J44.9]  Yes     Priority: Low     · Home Trilogy/CPAP  · Home 3L oxygen dependent. Maintain strict Sats between 88-92%      • Hypothyroidism [E03.9]  Yes     Priority: Low     Continue home Levothyroxine 25mcg, stable     • Coronary artery disease [I25.10]  Yes     Priority: Low     · S/P CABG x 3 (Primary), Bilateral carotid artery stenosis w/ 2 stents on left side,  PAD (peripheral artery disease) with stent in right upper leg  · Continue   - aspirin 81mg   - Plavix 75mg daily  - atorvastatin 20 mg daily  - lisinopril 5mg daily  -lopressor 25mg BID  -Imdur 30mg daily  - Ranexa - 500mg BID   · EKG - sinus tachycardia             • COPD (chronic obstructive pulmonary disease) ([facility]) [J44.9]  Yes     Priority: Low     - Dependent on 3L NC at Home  - Maintain strict sats between 88-92%  -Singulair 10mg   -DuoNebs  -NIPPV via Respiratory. Patient uses Trilogy at home/night      • Hypertension [I10]  Yes     Priority: Low       · Metoprolol 25mg bid, hold if HR < 60bpm  · Lisinopril 5mg daily  · Telemetry  · Hydralazine 10mg prn q6h for SBP > 170  · Labetalol 20mg Q6h >160           DVT prophylaxis: Heparin  Code status is   Code Status and Medical Interventions:   Ordered at: 01/09/22 1508     Level Of  Support Discussed With:    Next of Kin (If No Surrogate)     Code Status (Patient has no pulse and is not breathing):    CPR (Attempt to Resuscitate)     Medical Interventions (Patient has pulse or is breathing):    Full Support     Comments:    Discussed with  Huy Slater at bedside       Plan for disposition:Where: SNF and When: today - pending placement       Time: 20 mins         This document has been electronically signed by Luz Quijano MD on January 18, 2022 08:13 CST

## 2022-01-18 NOTE — PROGRESS NOTES
"[facility]  [address] [place] [address]. [address]  T - [phone]  F - [phone]     Progress Note          PATIENT  DEMOGRAPHICS   PATIENT NAME: Yamileth Slater                      PHYSICIAN: Ace Lauren MD  : 1960  MRN: [id number]   LOS: 8 days    Patient Care Team:  Rianna Macias MD as PCP - General (Family Medicine)  Subjective   SUBJECTIVE   No soa, comfortable.        Objective   OBJECTIVE   Vital Signs  Temp:  [96.1 °F (35.6 °C)-97.8 °F (36.6 °C)] 96.1 °F (35.6 °C)  Heart Rate:  [58-74] 61  Resp:  [18-22] 20  BP: (100-153)/(60-74) 131/60    Flowsheet Rows      First Filed Value   Admission Height 167.6 cm (66\") Documented at 2022 1304   Admission Weight 132 kg (291 lb 9.6 oz) Documented at 2022 1048           I/O last 3 completed shifts:  In: 600 [P.O.:600]  Out: 4100 [Urine:1100; Other:3000]    PHYSICAL EXAM    Physical Exam  Constitutional:       Appearance: She is well-developed.   HENT:      Head: Normocephalic.   Eyes:      Pupils: Pupils are equal, round, and reactive to light.   Cardiovascular:      Rate and Rhythm: Normal rate and regular rhythm.      Heart sounds: Normal heart sounds.   Pulmonary:      Effort: Pulmonary effort is normal.      Breath sounds: Normal breath sounds.   Abdominal:      General: Bowel sounds are normal.      Palpations: Abdomen is soft.   Musculoskeletal:         General: No swelling.   Skin:     Coloration: Skin is not jaundiced.   Neurological:      Mental Status: She is alert and oriented to person, place, and time.         RESULTS   Results Review:    Results from last 7 days   Lab Units 22  0623 22  0606 22  0701   SODIUM mmol/L 127* 125* 127*   POTASSIUM mmol/L 5.1 4.8 4.6   CHLORIDE mmol/L 90* 89* 91*   CO2 mmol/L 22.0 21.0* 22.0   BUN mg/dL 40* 53* 49*   CREATININE mg/dL 5.88* 7.96* 7.77*   CALCIUM mg/dL 8.8 8.3* 8.5*   GLUCOSE mg/dL 221* 233* 284*       Estimated Creatinine " Clearance: 13.7 mL/min (A) (by C-G formula based on SCr of 5.88 mg/dL (H)).    Results from last 7 days   Lab Units 01/13/22  0621 01/12/22  1950 01/12/22  1634   PHOSPHORUS mg/dL 6.0* 4.1 4.0             Results from last 7 days   Lab Units 01/18/22  0623 01/17/22  0606 01/16/22  0701 01/15/22  0719 01/14/22  0621   WBC 10*3/mm3 6.73 6.84 6.84 8.68 7.66   HEMOGLOBIN g/dL 8.1* 7.5* 7.6* 7.7* 8.0*   PLATELETS 10*3/mm3 351 271 266 225 204               Imaging Results (Last 24 Hours)     ** No results found for the last 24 hours. **           MEDICATIONS    aspirin, 81 mg, Oral, Daily  atorvastatin, 20 mg, Oral, Daily  bumetanide, 2 mg, Oral, Once per day on Sun Tue Thu Sat  clopidogrel, 75 mg, Oral, Daily  epoetin hubert/hubert-epbx, 10,000 Units, Intravenous, Once per day on Mon Wed Fri  gabapentin, 300 mg, Oral, Q8H  heparin (porcine), 5,000 Units, Subcutaneous, Q8H  insulin aspart, 0-24 Units, Subcutaneous, TID AC  insulin aspart, 30 Units, Subcutaneous, TID With Meals  insulin detemir, 30 Units, Subcutaneous, Daily  insulin detemir, 35 Units, Subcutaneous, Nightly  ipratropium-albuterol, 3 mL, Nebulization, 4x Daily - RT  isosorbide mononitrate, 30 mg, Oral, QAM  levothyroxine, 25 mcg, Oral, Daily  lidocaine, 1 patch, Transdermal, Q24H  lisinopril, 5 mg, Oral, Daily  metoprolol tartrate, 25 mg, Oral, Q12H  midodrine, 5 mg, Oral, Once per day on Mon Wed Fri  montelukast, 10 mg, Oral, Nightly  nystatin, , Topical, Q12H  [START ON 1/19/2022] oxybutynin XL, 5 mg, Oral, Nightly  pantoprazole, 40 mg, Oral, Q AM  ranolazine, 500 mg, Oral, Q12H  senna-docusate sodium, 2 tablet, Oral, BID  sodium chloride, 10 mL, Intravenous, Q12H      O2, 3 L/min, Last Rate: 3 L/min (01/09/22 1809)  sodium chloride, 10 mL/hr        Assessment/Plan   ASSESSMENT / PLAN      Fall    Hypertension    COPD (chronic obstructive pulmonary disease) (HCC)    Coronary artery disease    Hypothyroidism    MIKE and COPD overlap syndrome (HCC)     Unspecified diastolic (congestive) heart failure (HCC)    Anemia due to chronic kidney disease, on chronic dialysis (HCC)    Personal history of noncompliance with medical treatment, presenting hazards to health    Type 2 diabetes mellitus with kidney complication, with long-term current use of insulin (HCC)    Physical deconditioning    1.  ESRD on HD- Initiated HD on 10/21 due to hyperkalemia and fluid overload, now  ESRD.  HD MWF for now.  Keep bumex 2mg on non dialysis days. Euvolemic at present. Cr much higher this admission pre dialysis than it used to be ?due to poor catheter flow vs loss of function.    -keep midodrine pre dialysis. Catheter worked well after alteplase and able to get 300 - 350 blood flow. Next hd tomorrow. Plan for snf noted.      2.  Hyponatremia- in the setting of severe hyperglycemia, hyperosmolar hyponatremia. Now better with glucose correction     3.  HHS-started on insulin drip, managed per primary team. Off insulin drip. On aspart and levemir     4.  History of CAD     5.  History of COPD      6.  Anemia / previous GI bleed / b12 deficiency-  s/p capsule endoscopy which showed few small non-bleeding intestinal AVMs and single small polyp. Hgb slowly dropping.     7.  HTN- Continue home antihypertensives. Metoprolol lowered to 25mg           This document has been electronically signed by Ace Lauren MD on January 18, 2022 12:01 CST

## 2022-01-18 NOTE — PLAN OF CARE
Goal Outcome Evaluation:      From the time I started taking care of this pt,she was in dialysis ans her sugar was 108 and I held 30 units bc she couldn't eat till after dialysis was done.will cont to monitor

## 2022-01-19 ENCOUNTER — APPOINTMENT (OUTPATIENT)
Dept: GENERAL RADIOLOGY | Facility: HOSPITAL | Age: 62
End: 2022-01-19

## 2022-01-19 ENCOUNTER — APPOINTMENT (OUTPATIENT)
Dept: CT IMAGING | Facility: HOSPITAL | Age: 62
End: 2022-01-19
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
## 2022-01-19 LAB
ANION GAP SERPL CALCULATED.3IONS-SCNC: 14 MMOL/L (ref 5–15)
BUN SERPL-MCNC: 46 MG/DL (ref 8–23)
BUN/CREAT SERPL: 7.4 (ref 7–25)
CALCIUM SPEC-SCNC: 8.8 MG/DL (ref 8.6–10.5)
CHLORIDE SERPL-SCNC: 92 MMOL/L (ref 98–107)
CO2 SERPL-SCNC: 23 MMOL/L (ref 22–29)
CREAT SERPL-MCNC: 6.25 MG/DL (ref 0.57–1)
DEPRECATED RDW RBC AUTO: 44.5 FL (ref 37–54)
ERYTHROCYTE [DISTWIDTH] IN BLOOD BY AUTOMATED COUNT: 14.4 % (ref 12.3–15.4)
GFR SERPL CREATININE-BSD FRML MDRD: 7 ML/MIN/1.73
GFR SERPL CREATININE-BSD FRML MDRD: ABNORMAL ML/MIN/{1.73_M2}
GLUCOSE BLDC GLUCOMTR-MCNC: 118 MG/DL (ref 70–130)
GLUCOSE BLDC GLUCOMTR-MCNC: 132 MG/DL (ref 70–130)
GLUCOSE BLDC GLUCOMTR-MCNC: 177 MG/DL (ref 70–130)
GLUCOSE BLDC GLUCOMTR-MCNC: 229 MG/DL (ref 70–130)
GLUCOSE BLDC GLUCOMTR-MCNC: 387 MG/DL (ref 70–130)
GLUCOSE SERPL-MCNC: 238 MG/DL (ref 65–99)
HCT VFR BLD AUTO: 23.4 % (ref 34–46.6)
HGB BLD-MCNC: 7.6 G/DL (ref 12–15.9)
MCH RBC QN AUTO: 27.7 PG (ref 26.6–33)
MCHC RBC AUTO-ENTMCNC: 32.5 G/DL (ref 31.5–35.7)
MCV RBC AUTO: 85.4 FL (ref 79–97)
PLATELET # BLD AUTO: 311 10*3/MM3 (ref 140–450)
PMV BLD AUTO: 8.6 FL (ref 6–12)
POTASSIUM SERPL-SCNC: 5.3 MMOL/L (ref 3.5–5.2)
QT INTERVAL: 438 MS
QT INTERVAL: 454 MS
QTC INTERVAL: 541 MS
QTC INTERVAL: 591 MS
RBC # BLD AUTO: 2.74 10*6/MM3 (ref 3.77–5.28)
SODIUM SERPL-SCNC: 129 MMOL/L (ref 136–145)
WBC NRBC COR # BLD: 6.31 10*3/MM3 (ref 3.4–10.8)

## 2022-01-19 PROCEDURE — 25010000002 HEPARIN (PORCINE) PER 1000 UNITS: Performed by: STUDENT IN AN ORGANIZED HEALTH CARE EDUCATION/TRAINING PROGRAM

## 2022-01-19 PROCEDURE — 85027 COMPLETE CBC AUTOMATED: CPT | Performed by: STUDENT IN AN ORGANIZED HEALTH CARE EDUCATION/TRAINING PROGRAM

## 2022-01-19 PROCEDURE — 25010000002 EPOETIN ALFA-EPBX 10000 UNIT/ML SOLUTION: Performed by: INTERNAL MEDICINE

## 2022-01-19 PROCEDURE — 25010000002 HEPARIN (PORCINE) PER 1000 UNITS: Performed by: INTERNAL MEDICINE

## 2022-01-19 PROCEDURE — 63710000001 INSULIN DETEMIR PER 5 UNITS: Performed by: STUDENT IN AN ORGANIZED HEALTH CARE EDUCATION/TRAINING PROGRAM

## 2022-01-19 PROCEDURE — 94799 UNLISTED PULMONARY SVC/PX: CPT

## 2022-01-19 PROCEDURE — 99232 SBSQ HOSP IP/OBS MODERATE 35: CPT | Performed by: STUDENT IN AN ORGANIZED HEALTH CARE EDUCATION/TRAINING PROGRAM

## 2022-01-19 PROCEDURE — 82962 GLUCOSE BLOOD TEST: CPT

## 2022-01-19 PROCEDURE — 80048 BASIC METABOLIC PNL TOTAL CA: CPT | Performed by: STUDENT IN AN ORGANIZED HEALTH CARE EDUCATION/TRAINING PROGRAM

## 2022-01-19 PROCEDURE — 70450 CT HEAD/BRAIN W/O DYE: CPT

## 2022-01-19 PROCEDURE — 97110 THERAPEUTIC EXERCISES: CPT

## 2022-01-19 PROCEDURE — 73030 X-RAY EXAM OF SHOULDER: CPT

## 2022-01-19 PROCEDURE — 63710000001 INSULIN ASPART PER 5 UNITS: Performed by: STUDENT IN AN ORGANIZED HEALTH CARE EDUCATION/TRAINING PROGRAM

## 2022-01-19 PROCEDURE — 97535 SELF CARE MNGMENT TRAINING: CPT

## 2022-01-19 PROCEDURE — 94760 N-INVAS EAR/PLS OXIMETRY 1: CPT

## 2022-01-19 PROCEDURE — 73502 X-RAY EXAM HIP UNI 2-3 VIEWS: CPT

## 2022-01-19 RX ORDER — MUSCLE RUB CREAM 100; 150 MG/G; MG/G
1 CREAM TOPICAL 4 TIMES DAILY PRN
Qty: 85 G | Refills: 1 | Status: SHIPPED | OUTPATIENT
Start: 2022-01-19 | End: 2022-12-13

## 2022-01-19 RX ORDER — HEPARIN SODIUM 1000 [USP'U]/ML
2000 INJECTION, SOLUTION INTRAVENOUS; SUBCUTANEOUS AS NEEDED
Status: DISCONTINUED | OUTPATIENT
Start: 2022-01-19 | End: 2022-01-20 | Stop reason: HOSPADM

## 2022-01-19 RX ORDER — BUMETANIDE 2 MG/1
2 TABLET ORAL
Qty: 16 TABLET | Refills: 0 | Status: ON HOLD | OUTPATIENT
Start: 2022-01-20 | End: 2022-03-17 | Stop reason: SDUPTHER

## 2022-01-19 RX ORDER — ALBUMIN (HUMAN) 12.5 G/50ML
12.5 SOLUTION INTRAVENOUS AS NEEDED
Status: ACTIVE | OUTPATIENT
Start: 2022-01-19 | End: 2022-01-19

## 2022-01-19 RX ADMIN — HEPARIN SODIUM 2000 UNITS: 1000 INJECTION INTRAVENOUS; SUBCUTANEOUS at 15:45

## 2022-01-19 RX ADMIN — OXYBUTYNIN CHLORIDE 5 MG: 5 TABLET, EXTENDED RELEASE ORAL at 20:21

## 2022-01-19 RX ADMIN — METOPROLOL TARTRATE 25 MG: 25 TABLET, FILM COATED ORAL at 08:44

## 2022-01-19 RX ADMIN — GABAPENTIN 300 MG: 300 CAPSULE ORAL at 14:07

## 2022-01-19 RX ADMIN — HYDROCODONE BITARTRATE AND ACETAMINOPHEN 1 TABLET: 5; 325 TABLET ORAL at 12:48

## 2022-01-19 RX ADMIN — ZINC OXIDE 1 APPLICATION: 200 OINTMENT TOPICAL at 08:47

## 2022-01-19 RX ADMIN — ATORVASTATIN CALCIUM 20 MG: 20 TABLET, FILM COATED ORAL at 08:44

## 2022-01-19 RX ADMIN — GABAPENTIN 300 MG: 300 CAPSULE ORAL at 06:29

## 2022-01-19 RX ADMIN — HEPARIN SODIUM 5000 UNITS: 5000 INJECTION INTRAVENOUS; SUBCUTANEOUS at 21:46

## 2022-01-19 RX ADMIN — HEPARIN SODIUM 5000 UNITS: 5000 INJECTION INTRAVENOUS; SUBCUTANEOUS at 06:29

## 2022-01-19 RX ADMIN — INSULIN ASPART 30 UNITS: 100 INJECTION, SOLUTION INTRAVENOUS; SUBCUTANEOUS at 08:44

## 2022-01-19 RX ADMIN — INSULIN DETEMIR 30 UNITS: 100 INJECTION, SOLUTION SUBCUTANEOUS at 09:01

## 2022-01-19 RX ADMIN — CLOPIDOGREL 75 MG: 75 TABLET, FILM COATED ORAL at 08:44

## 2022-01-19 RX ADMIN — GABAPENTIN 300 MG: 300 CAPSULE ORAL at 20:21

## 2022-01-19 RX ADMIN — HEPARIN SODIUM 3600 UNITS: 1000 INJECTION INTRAVENOUS; SUBCUTANEOUS at 20:04

## 2022-01-19 RX ADMIN — DOCUSATE SODIUM 50 MG AND SENNOSIDES 8.6 MG 2 TABLET: 8.6; 5 TABLET, FILM COATED ORAL at 08:44

## 2022-01-19 RX ADMIN — HYDROCODONE BITARTRATE AND ACETAMINOPHEN 1 TABLET: 5; 325 TABLET ORAL at 20:21

## 2022-01-19 RX ADMIN — METOPROLOL TARTRATE 25 MG: 25 TABLET, FILM COATED ORAL at 20:21

## 2022-01-19 RX ADMIN — IPRATROPIUM BROMIDE AND ALBUTEROL SULFATE 3 ML: 2.5; .5 SOLUTION RESPIRATORY (INHALATION) at 07:17

## 2022-01-19 RX ADMIN — IPRATROPIUM BROMIDE AND ALBUTEROL SULFATE 3 ML: 2.5; .5 SOLUTION RESPIRATORY (INHALATION) at 10:49

## 2022-01-19 RX ADMIN — GABAPENTIN 300 MG: 300 CAPSULE ORAL at 21:46

## 2022-01-19 RX ADMIN — ASPIRIN 81 MG: 81 TABLET, FILM COATED ORAL at 08:44

## 2022-01-19 RX ADMIN — NYSTATIN: 100000 POWDER TOPICAL at 08:47

## 2022-01-19 RX ADMIN — HEPARIN SODIUM 5000 UNITS: 5000 INJECTION INTRAVENOUS; SUBCUTANEOUS at 14:07

## 2022-01-19 RX ADMIN — INSULIN DETEMIR 35 UNITS: 100 INJECTION, SOLUTION SUBCUTANEOUS at 21:00

## 2022-01-19 RX ADMIN — RANOLAZINE 500 MG: 500 TABLET, FILM COATED, EXTENDED RELEASE ORAL at 20:21

## 2022-01-19 RX ADMIN — NYSTATIN: 100000 POWDER TOPICAL at 21:46

## 2022-01-19 RX ADMIN — MONTELUKAST 10 MG: 10 TABLET, FILM COATED ORAL at 20:21

## 2022-01-19 RX ADMIN — EPOETIN ALFA-EPBX 10000 UNITS: 10000 INJECTION, SOLUTION INTRAVENOUS; SUBCUTANEOUS at 17:16

## 2022-01-19 RX ADMIN — INSULIN ASPART 20 UNITS: 100 INJECTION, SOLUTION INTRAVENOUS; SUBCUTANEOUS at 12:40

## 2022-01-19 RX ADMIN — RANOLAZINE 500 MG: 500 TABLET, FILM COATED, EXTENDED RELEASE ORAL at 08:44

## 2022-01-19 RX ADMIN — MIDODRINE HYDROCHLORIDE 5 MG: 5 TABLET ORAL at 08:43

## 2022-01-19 RX ADMIN — HEPARIN SODIUM 5000 UNITS: 5000 INJECTION INTRAVENOUS; SUBCUTANEOUS at 20:21

## 2022-01-19 RX ADMIN — SODIUM CHLORIDE, PRESERVATIVE FREE 10 ML: 5 INJECTION INTRAVENOUS at 20:21

## 2022-01-19 RX ADMIN — INSULIN ASPART 30 UNITS: 100 INJECTION, SOLUTION INTRAVENOUS; SUBCUTANEOUS at 12:40

## 2022-01-19 RX ADMIN — PANTOPRAZOLE SODIUM 40 MG: 40 TABLET, DELAYED RELEASE ORAL at 06:29

## 2022-01-19 RX ADMIN — LEVOTHYROXINE SODIUM 25 MCG: 25 TABLET ORAL at 08:44

## 2022-01-19 RX ADMIN — DOCUSATE SODIUM 50 MG AND SENNOSIDES 8.6 MG 2 TABLET: 8.6; 5 TABLET, FILM COATED ORAL at 20:21

## 2022-01-19 RX ADMIN — LIDOCAINE 1 PATCH: 50 PATCH CUTANEOUS at 08:44

## 2022-01-19 RX ADMIN — ISOSORBIDE MONONITRATE 30 MG: 30 TABLET, EXTENDED RELEASE ORAL at 06:29

## 2022-01-19 RX ADMIN — LISINOPRIL 5 MG: 5 TABLET ORAL at 08:43

## 2022-01-19 NOTE — SIGNIFICANT NOTE
Received call from nurse earlier this morning that patient had fallen in the bathroom.  Patient was assisted back to bed and complained of hitting her head, having a nosebleed, right shoulder and right hip pain.    Stat CT head, right shoulder x-ray, pelvis with right hip x-rays ordered.    Up at the bedside at 1025, patient was resting in bed with her slipper socks on.  Neuro exam done with no noted deficits.  Patient complained of posterior right shoulder tenderness, and right hip and proximal femur tenderness.  Patient exhibited full range of motion at both of those joints.  Patient stated she wanted to go home, but possibly tomorrow.  She was unable to tell me what needed to happen today in order for her to go home tomorrow.  Patient is supposed to have dialysis today and was to be discharged.    Sent message to case management to look for other rehab facilities should patient require additional rehab at discharge other than home PT.    My attending, Dr. Argueta, was present for this event.        This document has been electronically signed by Paulina Solano MD on January 19, 2022 10:47 CST    Paulina Solano MD PGY-3  Part of this note may be an electronic transcription/translation of spoken language to printed text using the Dragon Dictation System.

## 2022-01-19 NOTE — SIGNIFICANT NOTE
01/19/22 1452   OTHER   Discipline physical therapy assistant   Rehab Time/Intention   Session Not Performed patient/family declined treatment

## 2022-01-19 NOTE — DISCHARGE SUMMARY
DISCHARGE SUMMARY    PATIENT NAME: Yamileth Slater       PHYSICIAN: Luz Quijano MD  : 1960  MRN: [id number]    ADMITTED: 2022     DISCHARGED: 2022    ADMISSION DIAGNOSES:  Active Hospital Problems    Diagnosis  POA   • **Physical deconditioning [R53.81]  Yes     Priority: High   • Type 2 diabetes mellitus with kidney complication, with long-term current use of insulin (HCC) [E11.29, Z79.4]  Not Applicable     Priority: High   • Personal history of noncompliance with medical treatment, presenting hazards to health [Z91.19]  Not Applicable     Priority: Medium   • Anemia due to chronic kidney disease, on chronic dialysis (HCC) [N18.6, D63.1, Z99.2]  Not Applicable     Priority: Medium   • Unspecified diastolic (congestive) heart failure (HCC) [I50.30]  Yes     Priority: Low   • MIKE and COPD overlap syndrome (HCC) [G47.33, J44.9]  Yes     Priority: Low   • Hypothyroidism [E03.9]  Yes     Priority: Low   • Coronary artery disease [I25.10]  Yes     Priority: Low   • COPD (chronic obstructive pulmonary disease) (HCC) [J44.9]  Yes     Priority: Low   • Hypertension [I10]  Yes     Priority: Low      Resolved Hospital Problems    Diagnosis Date Resolved POA   • SIRS (systemic inflammatory response syndrome) ([facility]) [R65.10] 2022 Yes     Priority: High   • Acute cystitis with hematuria [N30.01] 01/15/2022 No     Priority: High   • Fall [W19.XXXA] 2022 No     Priority: High   • Type 2 diabetes mellitus with hyperosmolar hyperglycemic state (HHS) (HCC) [E11.00, E11.65] 2022 Yes     Priority: High   • Diabetic acidosis (HCC) [E11.10] 01/10/2022 Yes   • Altered mental status [R41.82] 2022 Yes   • CAD (coronary artery disease) [I25.10] 2022 Yes     DISCHARGE DIAGNOSES:   Active Hospital Problems    Diagnosis  POA   • **Physical deconditioning [R53.81]  Yes     Priority: High   • Type 2 diabetes mellitus with kidney complication, with long-term current use of insulin (HCC)  [E11.29, Z79.4]  Not Applicable     Priority: High   • Personal history of noncompliance with medical treatment, presenting hazards to health [Z91.19]  Not Applicable     Priority: Medium   • Anemia due to chronic kidney disease, on chronic dialysis (HCC) [N18.6, D63.1, Z99.2]  Not Applicable     Priority: Medium   • Unspecified diastolic (congestive) heart failure (HCC) [I50.30]  Yes     Priority: Low   • MIKE and COPD overlap syndrome (HCC) [G47.33, J44.9]  Yes     Priority: Low   • Hypothyroidism [E03.9]  Yes     Priority: Low   • Coronary artery disease [I25.10]  Yes     Priority: Low   • COPD (chronic obstructive pulmonary disease) (HCC) [J44.9]  Yes     Priority: Low   • Hypertension [I10]  Yes     Priority: Low      Resolved Hospital Problems    Diagnosis Date Resolved POA   • SIRS (systemic inflammatory response syndrome) (HCC) [R65.10] 01/11/2022 Yes     Priority: High   • Acute cystitis with hematuria [N30.01] 01/15/2022 No     Priority: High   • Fall [W19.XXXA] 01/19/2022 No     Priority: High   • Type 2 diabetes mellitus with hyperosmolar hyperglycemic state (HHS) (HCC) [E11.00, E11.65] 01/11/2022 Yes     Priority: High   • Diabetic acidosis (HCC) [E11.10] 01/10/2022 Yes   • Altered mental status [R41.82] 01/11/2022 Yes   • CAD (coronary artery disease) [I25.10] 01/09/2022 Yes       SERVICE: Family Medicine Residency  Attending: Radha Argueta MD  Resident: Luz Quijano MD    CONSULTS:   Consult Orders (all) (From admission, onward)     Start     Ordered    01/13/22 1051  Inpatient Case Management  Consult  Once        Comments: She will need rehab at discharge and can go as early as today if accepted.   Provider:  (Not yet assigned)    01/13/22 1051    01/10/22 0829  Inpatient Cardiology Consult  Once        Comments: Spoke with Dr. Rios overnight (on-call Cardiologist)   Specialty:  Cardiology  Provider:  Margarito Davis MD    01/10/22 0829    01/09/22 2049  Inpatient  Cardiology Consult  Once,   Status:  Canceled        Comments: Spoke with Dr. Rios overnight (on-call Cardiologist)   Specialty:  Cardiology  Provider:  Margarito Davis MD    01/09/22 2048 01/09/22 2000  Inpatient Cardiology Consult  Once,   Status:  Canceled        Specialty:  Cardiology  Provider:  Niall Rios MD    01/09/22 2000 01/09/22 1548  Inpatient Nephrology Consult  Once        Specialty:  Nephrology  Provider:  Ace Lauren MD    01/09/22 1548 01/09/22 1511  Inpatient Nutrition Consult  Once        Provider:  (Not yet assigned)    01/09/22 1511    01/09/22 1511  Inpatient Diabetes Educator Consult  Once        Provider:  (Not yet assigned)    01/09/22 1511    Signed and Held  Inpatient Nutrition Consult  Once,   Status:  Canceled        Provider:  (Not yet assigned)    Signed and Held    Signed and Held  Inpatient Diabetes Educator Consult  Once,   Status:  Canceled        Provider:  (Not yet assigned)    Signed and Held                PROCEDURES:   None    HISTORY OF PRESENT ILLNESS:   Taken from Dr Luz Quijano's H and P dated 1/9/2022 at 1549 hours  Yamileth Slater is a 61 y.o. female with a CMH of CKD stage 4 on dialsysi MWF, CAD s/p CABG x3  uncontrolled DM type 2, HFpEF, GERD, HTN, HLD, MIKE on CPAP who presents to the ED with AMS. Patient is accompanied with  who states last known normal was around midnight. She went to bed around 9pm after dinner. She woke up multiple times through the night with increased urinary frequency and wet the bed. Around 7 am this morning patient's  noticed patient was altered and brought her to ED. She is a [facility]wf dialysis patient and completed her dialysis on Friday without difficulty.     ED course: Serum glucose 800, 1 L NS, CT Head wnl, EKG, sinus tachycardia, picc line placed, Zosyn/Vancomycin started, Insulin Drip ordered.   DIAGNOSTIC DATA:   XR Shoulder 2+ View Right    Result Date: 1/19/2022  Negative shoulder.  Electronically signed by:  Young Johnson MD  1/19/2022 10:22 AM CST Workstation: [id number]    CT Head Without Contrast    Result Date: 1/19/2022  1. Chronic changes with generalized cortical atrophy and evidence of chronic ischemic microvascular disease. 2. No radiographic evidence of acute intracranial pathology. 3. Findings as above indicative of mastoiditis on the left. Electronically signed by:  Hilda Orellana MD  1/19/2022 10:07 AM CST Workstation: [id number]    CT Head Without Contrast    Result Date: 1/13/2022  1. Stable appearance of the brain without radiographic evidence of acute intracranial pathology. Electronically signed by:  Hilda Orellana MD  1/13/2022 1:53 PM CST Workstation: [id number]    CT Head Without Contrast    Result Date: 1/9/2022  No acute intracranial process. Unchanged sequela of chronic ischemic microvascular disease and cerebral volume loss. Electronically signed by:  Kamar Khanna MD  1/9/2022 10:52 AM CST Workstation: [id number]    XR Chest 1 View    Result Date: 1/9/2022  Unchanged cardiomegaly with probable mild pulmonary edema. Electronically signed by:  Kamar Khanna MD  1/9/2022 11:12 AM CST Workstation: [id number]    XR Pelvis 1 or 2 View    Result Date: 1/13/2022  No gross displaced fracture evident on frontal radiographic image. If there is persistent clinical concern, would recommend repeat imaging or cross-sectional imaging for increased sensitivity. Electronically signed by:  Jm Mckeon MD  1/13/2022 6:41 PM CST Workstation: [id number]    XR Hip With or Without Pelvis 2 - 3 View Right    Result Date: 1/19/2022  No acute AP pelvis or right hip abnormality. Electronically signed by:  Young Johnson MD  1/19/2022 10:20 AM CST Workstation: [id number]    XR Chest Post CVA Port    Result Date: 1/9/2022  CONCLUSION: Right PICC line now in place with tip projected over the proximal superior vena cava. Right internal jugular multilumen catheter remains in place with  tip projected over the proximal superior vena cava. Sternotomy. Cardiomegaly with minimal pulmonary vascular congestion and interstitial edema. Perhaps very small right pleural effusion. [id number] Electronically signed by:  Jon Patricia MD  1/9/2022 12:35 PM [employer] Workstation: [email or web address]    Lab Results (last 24 hours)     Procedure Component Value Units Date/Time    Blood Culture - Blood, Arm, Right [[id number]]  (Normal) Collected: 01/15/22 0725    Specimen: Blood from Arm, Right Updated: 01/20/22 0732     Blood Culture No growth at 5 days    Blood Culture - Blood, Arm, Left [[id number]]  (Normal) Collected: 01/15/22 0719    Specimen: Blood from Arm, Left Updated: 01/20/22 0732     Blood Culture No growth at 5 days    Basic Metabolic Panel [[id number]]  (Abnormal) Collected: 01/20/22 0622    Specimen: Blood Updated: 01/20/22 0658     Glucose 252 mg/dL      BUN 31 mg/dL      Creatinine 4.65 mg/dL      Sodium 127 mmol/L      Potassium 5.5 mmol/L      Chloride 93 mmol/L      CO2 26.0 mmol/L      Calcium 8.9 mg/dL      eGFR   Amer --     Comment: <15 Indicative of kidney failure.        eGFR Non African Amer 10 mL/min/1.73      Comment: <15 Indicative of kidney failure.        BUN/Creatinine Ratio 6.7     Anion Gap 8.0 mmol/L     Narrative:      GFR Normal >60  Chronic Kidney Disease <60  Kidney Failure <15      POC Glucose Once [[id number]]  (Abnormal) Collected: 01/20/22 0623    Specimen: Blood Updated: 01/20/22 0644     Glucose 242 mg/dL      Comment: RN NotifiedOperator: [id number] [facility]: [id number]       CBC (No Diff) [[id number]]  (Abnormal) Collected: 01/20/22 0622    Specimen: Blood Updated: 01/20/22 0636     WBC 7.68 10*3/mm3      RBC 2.81 10*6/mm3      Hemoglobin 7.8 g/dL      Hematocrit 24.5 %      MCV 87.2 fL      MCH 27.8 pg      MCHC 31.8 g/dL      RDW 14.3 %      RDW-SD 45.1 fl      MPV 8.5 fL      Platelets 332 10*3/mm3     POC Glucose Once [[id number]]  (Abnormal) Collected:  01/19/22 2326    Specimen: Blood Updated: 01/19/22 2353     Glucose 177 mg/dL      Comment: RN NotifiedOperator: [id number] AGENT CHRISTOPHERMeter ID: [id number]       POC Glucose Once [[id number]]  (Normal) Collected: 01/19/22 2106    Specimen: Blood Updated: 01/19/22 2121     Glucose 118 mg/dL      Comment: RN NotifiedOperator: [id number] AGENT CHRISTOPHERMeter ID: [id number]       POC Glucose Once [[id number]]  (Abnormal) Collected: 01/19/22 1640    Specimen: Blood Updated: 01/19/22 1652     Glucose 132 mg/dL      Comment: RN NotifiedOperator: [id number] YAAKOV RAYAMeter ID: [id number]              HOSPITAL COURSE:  Patient was admitted for altered mental status, HHS, and chronic renal failure requiring hemodialysis.  She was started on an insulin drip for several hours and was transitioned to long-acting and short acting insulin.  She had several CT head scans which were negative for acute findings.  She was unable to fit in the MRI scanner for MRI of the brain due to body habitus.  Patient received hemodialysis on her home schedule of Monday Wednesday Friday.  Patient slowly returned to baseline and was working well with physical therapy.  Attempt to place patient in rehab facility was unsuccessful secondary to insurance coverage and availability of beds.  Patient was ambulating independently around the room on day of discharge and will be going home with home health nursing and home PT for continued therapy.  Patient had a controlled descent to the floor on hospital day 5 when she tried to get out of bed by herself without her walker.  Patient had no injury but CT of the head was done which was negative for acute findings.  Patient had pain over her tailbone from when she let her self down to the floor while hanging onto the computer station.  Lidocaine patch and Bengay was used to help alleviate her pain.  Patient had a second fall on day 10 without any injury. She complained of headache, nose bleed,  right shoulder and right hip pain. CT head, shoulder xray and pelvis with hip xray were ordered which resulted with normal findings and no acute pathology. Neuro exam was unremarkable with no noted deficits.   Over the course of her hospital stay, her Levemir dosing was adjusted to 30 units in the morning and 35 units at night.  Patient was given sliding scale as well as 30 units novolog 3 times a day with meals.  Patient's predinner NovoLog dosing was held several times by nursing staff which made it difficult for us to fully appreciate the effects of the Levemir nighttime dosing.  Patient will be fitted with freestyle payam CGM device prior to discharge with close follow-up in clinic over the next couple of weeks for adjustment of insulin dosing.  DISCHARGE CONDITION:   Stable    DISPOSITION:  Home-Health Care [facility]    DISCHARGE MEDICATIONS     Discharge Medications      New Medications      Instructions Start Date   Levemir FlexTouch 100 UNIT/ML injection  Generic drug: insulin detemir  Replaces: insulin detemir 100 UNIT/ML injection   Inject 30 Units under the skin into the appropriate area as directed Every morning and 35 units every night.      muscle rub 10-15 % cream cream   Apply 1 application topically to the appropriate area as directed 4 (Four) Times a Day As Needed for buttock pain.         Changes to Medications      Instructions Start Date   bumetanide 2 MG tablet  Commonly known as: BUMEX  What changed: Another medication with the same name was removed. Continue taking this medication, and follow the directions you see here.  Notes to patient: As directed   2 mg, Oral, 4 Times Weekly      insulin aspart 100 UNIT/ML solution pen-injector sc pen  Commonly known as: novoLOG FLEXPEN  What changed: Another medication with the same name was added. Make sure you understand how and when to take each.   30 Units, Subcutaneous, 3 Times Daily With Meals      NovoLOG FlexPen 100 UNIT/ML solution pen-injector sc  pen  Generic drug: insulin aspart  What changed: You were already taking a medication with the same name, and this prescription was added. Make sure you understand how and when to take each.   Inject 30 units in addition to sliding scale on sheet of paper (0-24 Units) under the skin as directed 3 (three) times daily with meals.      metoprolol tartrate 25 MG tablet  Commonly known as: LOPRESSOR  What changed:   · medication strength  · See the new instructions.   Take 1 tablet by mouth Every 12 (Twelve) Hours.         Continue These Medications      Instructions Start Date   acetaminophen 650 MG 8 hr tablet  Commonly known as: Tylenol 8 Hour   650 mg, Oral, Every 8 Hours PRN      Adult Aspirin Regimen 81 MG EC tablet  Generic drug: aspirin   81 mg, Oral, Daily      albuterol sulfate  (90 Base) MCG/ACT inhaler  Commonly known as: PROVENTIL HFA;VENTOLIN HFA;PROAIR HFA   2 puffs, Inhalation, Every 4 Hours PRN      albuterol (2.5 MG/3ML) 0.083% nebulizer solution  Commonly known as: PROVENTIL   Inhale the contents of 1 vial by nebulization Every 4 (Four) Hours As Needed for Wheezing.      atorvastatin 20 MG tablet  Commonly known as: LIPITOR   TAKE ONE TABLET BY MOUTH EVERY NIGHT AT BEDTIME      cetirizine 10 MG tablet  Commonly known as: zyrTEC   10 mg, Oral, Daily      clopidogrel 75 MG tablet  Commonly known as: PLAVIX   75 mg, Oral, Daily      [employer] Blood Glucose Meter w/Device kit   1 each, Does not apply, 3 Times Daily      Easy Touch Pen Needles 31G X 8 MM misc  Generic drug: Insulin Pen Needle  Notes to patient: As directed   No dose, route, or frequency recorded.      NovoFine 30G X 8 MM misc  Generic drug: Insulin Pen Needle  Notes to patient: As directed   As directed 4 times daily      TRUEplus Pen Needles 31G X 8 MM misc  Generic drug: Insulin Pen Needle   Use to inject insulin 4 (Four) Times a Day as directed.      epoetin hubert-epbx 14141 UNIT/ML injection  Commonly known as: RETACRIT   10,000 Units,  Subcutaneous, 3 Times Weekly      ferrous sulfate 325 (65 FE) MG tablet  Commonly known as: FeroSul   325 mg, Oral, Daily With Breakfast      fluticasone 50 MCG/ACT nasal spray  Commonly known as: FLONASE   2 sprays, Each Nare, Daily      FreeStyle Jade 2 [place] device  Notes to patient: As directed   1 each, Does not apply, Continuous      FreeStyle Jade 2 Sensor misc   1 each, Does not apply, Every 14 Days      gabapentin 800 MG tablet  Commonly known as: NEURONTIN   800 mg, Oral, 3 Times Daily      glucose blood test strip  Notes to patient: As directed   Use as instructed      ipratropium-albuterol 0.5-2.5 mg/3 ml nebulizer  Commonly known as: DUO-NEB   3 mL, Nebulization, 4 Times Daily - RT      isosorbide mononitrate 30 MG 24 hr tablet  Commonly known as: IMDUR   30 mg, Oral, Every Morning      levothyroxine 25 MCG tablet  Commonly known as: SYNTHROID, LEVOTHROID   25 mcg, Oral, Daily      lidocaine 5 %  Commonly known as: LIDODERM   APPLY TO THE AFFECTED AREA(S) TOPICALLY TWO TIMES A DAY. REMOVE NIGHTLY      lisinopril 5 MG tablet  Commonly known as: PRINIVIL,ZESTRIL   5 mg, Oral, Daily      montelukast 10 MG tablet  Commonly known as: SINGULAIR   10 mg, Oral, Nightly      nitroglycerin 0.4 MG SL tablet  Commonly known as: NITROSTAT   0.4 mg, Sublingual, Every 5 Minutes PRN      nystatin 692409 UNIT/GM powder  Commonly known as: MYCOSTATIN   Topical, 3 Times Daily      O2  Commonly known as: OXYGEN  Notes to patient: As directed   3 L/min, Inhalation, Continuous      ondansetron ODT 4 MG disintegrating tablet  Commonly known as: Zofran ODT   4 mg, Translingual, Every 8 Hours PRN      oxybutynin XL 5 MG 24 hr tablet  Commonly known as: DITROPAN-XL   5 mg, Oral, Daily      pantoprazole 40 MG EC tablet  Commonly known as: PROTONIX   40 mg, Oral, Nightly      promethazine 25 MG tablet  Commonly known as: PHENERGAN   25 mg, Oral, Every 6 Hours PRN      ranolazine 500 MG 12 hr tablet  Commonly known as: RANEXA    500 mg, Oral, Every 12 Hours Scheduled      sennosides-docusate 8.6-50 MG per tablet  Commonly known as: PERICOLACE   2 tablets, Oral, 2 Times Daily         Stop These Medications    cyclobenzaprine 10 MG tablet  Commonly known as: FLEXERIL     insulin detemir 100 UNIT/ML injection  Commonly known as: LEVEMIR  Replaced by: Levemir FlexTouch 100 UNIT/ML injection     potassium chloride 10 MEQ CR tablet            INSTRUCTIONS:  Activity:   Activity Instructions     Activity as Tolerated      Activity as Tolerated          Diet:   Diet Instructions     Diet: Consistent Carbohydrate, Cardiac, Renal      Discharge Diet:  Consistent Carbohydrate  Cardiac  Renal       32 ounces fluid restriction    Diet: Consistent Carbohydrate, Renal      Discharge Diet:  Consistent Carbohydrate  Renal       Fluid Restriction per day: Other (See comments)    Fluid restriction 1200 mls/day          FOLLOW UP:   Additional Instructions for the Follow-ups that You Need to Schedule     Ambulatory Referral to Home Health   As directed      Face to Face Visit Date: 1/19/2022    Follow-up provider for Plan of Care?: I treated the patient in an acute care facility and will not continue treatment after discharge.    Follow-up provider: MAITE QUINN [[id number]]    Reason/Clinical Findings: deconditioning    Describe mobility limitations that make leaving home difficult: deconditioning, dialysis    Nursing/Therapeutic Services Requested: Skilled Nursing Physical Therapy    Skilled nursing orders: Medication education Neurovascular assessments    PT orders: Therapeutic exercise Gait Training Home safety assessment    Weight Bearing Status: As Tolerated    Frequency: 1 Week 1         Ambulatory Referral to Home Health   As directed      Face to Face Visit Date: 1/20/2022    Follow-up provider for Plan of Care?: I treated the patient in an acute care facility and will not continue treatment after discharge.    Follow-up provider: MAITE QUINN  [[id number]]    Reason/Clinical Findings: deconditioning    Describe mobility limitations that make leaving home difficult: deconditioing    Nursing/Therapeutic Services Requested: Physical Therapy Occupational Therapy Skilled Nursing    Skilled nursing orders: Medication education (CGM sensor and reader) Other    PT orders: Strengthening Total joint pathway    Occupational orders: Activities of daily living Strengthening    Frequency: 1 Week 1         Discharge Follow-up with PCP   As directed       Currently Documented PCP:    Rianna Macias MD    PCP Phone Number:    [phone]     Follow Up Details: in one week         Discharge Follow-up with PCP   As directed       Currently Documented PCP:    Rianna Macias MD    PCP Phone Number:    [phone]     Follow Up Details: one week            Contact information for follow-up providers     Rianna Macias MD Follow up.    Specialty: Family Medicine  Why: 01/26/2022  Wed. at 10:30am  Contact information:  [address] [place] [address]  [phone]             Rianna Macias MD .    Specialty: Family Medicine  Contact information:  [address] [place] [address]  [phone]                   Contact information for after-discharge care     Dialysis/Infusion     [facility] .    Service: Dialysis  Contact information:  [address] [place] [address]  [phone]                 Home Medical Care     [facility] .    Service: Home Health Services  Contact information:  [address] [place] [address] [phone]                             PENDING TEST RESULTS AT DISCHARGE      Time: >30 minutes were spent in discharge planning, medication reconciliation and coordination of care for this patient.    Alex Wells MD is the attending at time of discharge, He is aware of the patient's status and agrees with the above discharge summary.        This document has been  electronically signed by Luz Quijano MD on January 21, 2022 17:22 CST

## 2022-01-19 NOTE — THERAPY TREATMENT NOTE
Patient Name: Yamileth Slater  : 1960    MRN: [id number]                              Today's Date: 2022       Admit Date: 2022    Visit Dx:     ICD-10-CM ICD-9-CM   1. Diabetic ketoacidosis with coma associated with other specified diabetes mellitus (HCC)  E13.11 250.32   2. Hypertension, unspecified type  I10 401.9   3. Altered mental status, unspecified altered mental status type  R41.82 780.97   4. Dysphagia, unspecified type  R13.10 787.20   5. Impaired functional mobility, balance, gait, and endurance  Z74.09 V49.89   6. Impaired mobility and ADLs  Z74.09 V49.89    Z78.9    7. Physical deconditioning  R53.81 799.3     Patient Active Problem List   Diagnosis   • Chronic obstructive pulmonary disease (HCC)   • Chronic midline low back pain without sciatica   • Vitamin D deficiency   • Tobacco dependence syndrome   • Surgical follow-up care   • Shoulder joint pain   • Peripheral vascular disease ([facility])   • Pain   • Neurologic disorder associated with diabetes mellitus ([facility])   • Morbid obesity with BMI of 50.0-59.9, adult ([facility])   • Mixed hyperlipidemia   • Kidney stone   • History of colon polyps   • GERD (gastroesophageal reflux disease)   • Excoriated eczema   • Hypertension   • Encounter for medication refill   • Dyslipidemia   • Diabetes mellitus ([facility])   • COPD (chronic obstructive pulmonary disease) ([facility])   • Chronic folliculitis   • Coronary artery disease   • Mild persistent asthma   • 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   • Hypothyroidism   • Carotid artery disease ([facility])   • Current smoker   • [place] abuse   • PAD (peripheral artery disease) ([facility])   • Intercostal pain   • Venous insufficiency   • LAFB (left anterior fascicular block)   • Pulmonary hypertension ([facility])   • Left hip pain   • Neck pain   • CKD (chronic kidney disease), symptom management only, stage 5 ([facility])   • MIKE and COPD overlap syndrome ([facility])   • Pneumonia due to COVID-19 virus   • Hyperkalemia    • Cutaneous candidiasis   • Community acquired pneumonia of right middle lobe of lung   • MRSA infection   • Alkaline phosphatase elevation   • COVID-19 ruled out by laboratory testing   • Esophageal dysphagia   • Monilial esophagitis ([facility])   • NSTEMI, initial episode of care ([facility])   • Pneumonia due to infectious organism   • Cellulitis of left leg   • Unspecified diastolic (congestive) heart failure ([facility])   • Hyponatremia   • Urinary tract infection due to Proteus   • History of insertion of tunneled central venous catheter (CVC) with port   • Anemia due to chronic kidney disease, on chronic dialysis ([facility])   • Pneumonitis   • Personal history of noncompliance with medical treatment, presenting hazards to health   • Type 2 diabetes mellitus with kidney complication, with long-term current use of insulin ([facility])   • Physical deconditioning     Past Medical History:   Diagnosis Date   • Acute blood loss anemia 4/16/2017    Likely due to gastric oozing at this time. - Dr. Duarte (GI) was consulted and has now signed off, will follow up outpatient - pill colonoscopy showed AVMs - continue to monitor   • Acute cystitis with hematuria 3/31/2021    1/13: IV Rocephin 1 gm q 24 1/14 : transitioned  to omnicef 300mg. Urine cultures resulted and did not show growth. Omnicef discontinued as patient is asymptomatic   • Altered mental status 1/9/2022    - AMS on presentation - initial ABG pH 7.3, CO2 34 - Procal 0.29 - UA negative for acute cysitits -CTA head wnl  - Empiric Zosyn and Vancomycin -Lactate 2.5 on admission  - blood cultures no growth at 24 hours     • Anxiety    • CAD (coronary artery disease) 4/24/2021    S/P 3 stents 5/1/2021 for [facility] Continue ASA 81mg & Clopidogrel 75mg Continue Atorvastatin 40mg   • Carotid artery stenosis    • Chronic obstructive lung disease (HCC)    • CKD (chronic kidney disease) stage 4, GFR 15-29 ml/min ([facility])    • Colonic polyp    • Coronary arteriosclerosis    • Diabetes mellitus ([facility])    •  Diabetic neuropathy (HCC)    • Ear pain, right 10/18/2021    - canal trauma due to patient scratching and DMT2 - added cortisporin ear drops   • Elevated troponin 10/12/2021    -most likely from CKD -Trending down -Neg chest pain   • GERD (gastroesophageal reflux disease)    • GI bleed 5/13/2021    - GI will follow up outpatient - Protonix 40mg daily - Avoid medical DVT prophy and use mechanical at this time instead. - Continue to monitor - pill colonoscopy results showed AVMs   • History of transfusion    • Hypercholesterolemia    • Hypertension    • Hypomagnesemia 6/27/2021    Monitor and replace   • Morbid obesity ([facility])    • Nephrolithiasis    • Peripheral vascular disease ([facility])    • SIRS (systemic inflammatory response syndrome) ([facility]) 1/9/2022    Admission  - WBC 17.78   -   - RR 16 - 1/10: VSS/wnl - CXR - Mild pulm edema - Blood cultures no growth at 24 hours  - Procalcitonin 0.29 - UA : glucose 1000, negative Leucocytes/nitrate - Empiric Zosyn and Vancomcyin    • Sleep apnea    • Substance abuse ([facility])    • Vitamin D deficiency      Past Surgical History:   Procedure Laterality Date   • CARDIAC CATHETERIZATION N/A 7/14/2020   • CARDIAC CATHETERIZATION N/A 4/23/2021    Procedure: Left Heart Cath;  Surgeon: Melba Romo MD;  Location: [facility] CATH INVASIVE LOCATION;  Service: Cardiology;  Laterality: N/A;   • CARDIAC CATHETERIZATION N/A 4/30/2021    Procedure: Percutaneous Coronary Intervention;  Surgeon: Russell Voss MD;  Location: [facility] CATH INVASIVE LOCATION;  Service: Cardiovascular;  Laterality: N/A;   • CARDIAC CATHETERIZATION N/A 4/30/2021    Procedure: Stent NIKKI coronary;  Surgeon: Russell Voss MD;  Location: [facility] CATH INVASIVE LOCATION;  Service: Cardiovascular;  Laterality: N/A;   • CARDIAC CATHETERIZATION Left 11/13/2021    Procedure: Left Heart Cath;  Surgeon: Niall Rios MD;  Location: [facility] CATH INVASIVE LOCATION;  Service: Cardiology;  Laterality: Left;   •  CAROTID STENT Left    • COLONOSCOPY     • COLONOSCOPY N/A 5/14/2021    Procedure: COLONOSCOPY;  Surgeon: Mingo Duarte MD;  Location: [facility] ENDOSCOPY;  Service: Gastroenterology;  Laterality: N/A;   • CORONARY ARTERY BYPASS GRAFT N/A 2013    CABG X 3   • CYSTOSCOPY BLADDER STONE LITHOTRIPSY Bilateral    • ENDOSCOPY N/A 4/12/2021    Procedure: ESOPHAGOGASTRODUODENOSCOPY;  Surgeon: Mingo Duarte MD;  Location: [facility] ENDOSCOPY;  Service: Gastroenterology;  Laterality: N/A;   • ENDOSCOPY N/A 5/14/2021    Procedure: ESOPHAGOGASTRODUODENOSCOPY;  Surgeon: Mingo Duarte MD;  Location: [facility] ENDOSCOPY;  Service: Gastroenterology;  Laterality: N/A;   • INTERVENTIONAL RADIOLOGY PROCEDURE N/A 10/21/2021    Procedure: tunneled central venous catheter placement;  Surgeon: Donnie Robles MD;  Location: [facility] ANGIO INVASIVE LOCATION;  Service: Interventional Radiology;  Laterality: N/A;      General Information     Row Name 01/19/22 1400          OT Time and Intention    Document Type therapy note (daily note)  -CS     Mode of Treatment occupational therapy  -CS     Row Name 01/19/22 1400          General Information    Patient Profile Reviewed yes  -CS     Existing Precautions/Restrictions fall  -CS     Row Name 01/19/22 1400          Cognition    Orientation Status (Cognition) oriented x 3  -CS     Row Name 01/19/22 1400          Safety Issues, Functional Mobility    Impairments Affecting Function (Mobility) balance; coordination; endurance/activity tolerance; strength; shortness of breath  -CS           User Key  (r) = Recorded By, (t) = Taken By, (c) = Cosigned By    Initials Name Provider Type    CS May Montez COTA Occupational Therapy Assistant                 Mobility/ADL's     Row Name 01/19/22 1400          Bed Mobility    Bed Mobility supine-sit; sit-supine  -CS     Supine-Sit [place] (Bed Mobility) supervision  -CS     Sit-Supine [place] (Bed Mobility) supervision  -CS      Assistive Device (Bed Mobility) bed rails; head of bed elevated  -     Row Name 01/19/22 1400          Transfers    Transfers sit-stand transfer; toilet transfer  -     Sit-Stand [place] (Transfers) contact guard; minimum assist (75% patient effort)  -     [place] Level (Toilet Transfer) contact guard; minimum assist (75% patient effort)  -     Assistive Device (Toilet Transfer) walker, front-wheeled  -     Row Name 01/19/22 1400          Sit-Stand Transfer    Assistive Device (Sit-Stand Transfers) other (see comments)  HHA  -     Row Name 01/19/22 1400          Toilet Transfer    Type (Toilet Transfer) stand-sit; sit-stand  -     Row Name 01/19/22 1400          Functional Mobility    Functional Mobility- Ind. Level contact guard assist; minimum assist (75% patient effort)  -     Functional Mobility-Distance (Feet) 15  -     Row Name 01/19/22 1400          Toileting Assessment/Training    [place] Level (Toileting) toileting skills; perform perineal hygiene; standby assist  -     Position (Toileting) unsupported sitting  -           User Key  (r) = Recorded By, (t) = Taken By, (c) = Cosigned By    Initials Name Provider Type     May Montez COTA Occupational Therapy Assistant               Obj/Interventions     Row Name 01/19/22 1400          Shoulder (Therapeutic Exercise)    Shoulder (Therapeutic Exercise) AROM (active range of motion)  -     Shoulder AROM (Therapeutic Exercise) bilateral; flexion; extension; external rotation; internal rotation  -     Row Name 01/19/22 1400          Elbow/Forearm (Therapeutic Exercise)    Elbow/Forearm (Therapeutic Exercise) AROM (active range of motion)  -     Elbow/Forearm AROM (Therapeutic Exercise) bilateral; flexion; extension; supination; pronation  -     Row Name 01/19/22 1400          Wrist (Therapeutic Exercise)    Wrist (Therapeutic Exercise) AROM (active range of motion)  -     Wrist AROM (Therapeutic Exercise)  bilateral; flexion; extension  -     Row Name 01/19/22 1400          Hand (Therapeutic Exercise)    Hand (Therapeutic Exercise) AROM (active range of motion)  -     Hand AROM/AAROM (Therapeutic Exercise) bilateral; finger flexion; finger extension  -     Row Name 01/19/22 1400          Therapeutic Exercise    Therapeutic Exercise shoulder; elbow/forearm; wrist; hand  -CS           User Key  (r) = Recorded By, (t) = Taken By, (c) = Cosigned By    Initials Name Provider Type    CS May Montez COTA Occupational Therapy Assistant               Goals/Plan     Row Name 01/19/22 1400          Transfer Goal 1 (OT)    Activity/Assistive Device (Transfer Goal 1, OT) toilet  -CS     [place] Level/Cues Needed (Transfer Goal 1, OT) standby assist  -CS     Time Frame (Transfer Goal 1, OT) long term goal (LTG); by discharge  -CS     Progress/Outcome (Transfer Goal 1, OT) goal not met  -     Row Name 01/19/22 1400          Bathing Goal 1 (OT)    Activity/Device (Bathing Goal 1, OT) lower body bathing  -CS     [place] Level/Cues Needed (Bathing Goal 1, OT) minimum assist (75% or more patient effort)  -CS     Time Frame (Bathing Goal 1, OT) long term goal (LTG); by discharge  -CS     Progress/Outcomes (Bathing Goal 1, OT) goal not met  -     Row Name 01/19/22 1400          Dressing Goal 1 (OT)    Activity/Device (Dressing Goal 1, OT) lower body dressing  -CS     [place]/Cues Needed (Dressing Goal 1, OT) minimum assist (75% or more patient effort)  -CS     Time Frame (Dressing Goal 1, OT) long term goal (LTG); by discharge  -CS     Progress/Outcome (Dressing Goal 1, OT) goal not met  -     Row Name 01/19/22 1400          Toileting Goal 1 (OT)    Activity/Device (Toileting Goal 1, OT) toileting skills, all  -CS     [place] Level/Cues Needed (Toileting Goal 1, OT) minimum assist (75% or more patient effort)  -CS     Time Frame (Toileting Goal 1, OT) long term goal (LTG); by discharge  -CS      Progress/Outcome (Toileting Goal 1, OT) goal not met  -CS           User Key  (r) = Recorded By, (t) = Taken By, (c) = Cosigned By    Initials Name Provider Type    May Del Valle COTA Occupational Therapy Assistant               Clinical Impression     Row Name 01/19/22 1400          Pain Scale: Numbers Pre/Post-Treatment    Pretreatment Pain Rating 5/10  -CS     Posttreatment Pain Rating 5/10  -CS     Pain Location hip  -CS     Row Name 01/19/22 1400          Plan of Care Review    Plan of Care Reviewed With patient  -CS     Progress no change  -CS     Outcome Summary Pt tolerated tx well this date. Pt was SBA with bed mobility. Pt was CGA/min A with sit-stand. Pt was CGA/min A with toilet t/f; SBA  with toileting. Pt gave good effort with UE ther ex. Continue OT POC.  -CS     Row Name 01/19/22 1400          Therapy Assessment/Plan (OT)    Rehab Potential (OT) good, to achieve stated therapy goals  -CS     Criteria for Skilled Therapeutic Interventions Met (OT) yes; skilled treatment is necessary  -CS     Row Name 01/19/22 1400          Therapy Plan Review/Discharge Plan (OT)    Anticipated Discharge Disposition (OT) skilled nursing facility  -     Row Name 01/19/22 1400          Vital Signs    Pre Patient Position Supine  -CS     Intra Patient Position Sitting  -CS     Post Patient Position Supine  -CS     Row Name 01/19/22 1400          Positioning and Restraints    Pre-Treatment Position in bed  -CS     Post Treatment Position bed  -CS     In Bed fowlers; call light within reach; encouraged to call for assist; exit alarm on  -CS           User Key  (r) = Recorded By, (t) = Taken By, (c) = Cosigned By    Initials Name Provider Type    May Del Valle COTA Occupational Therapy Assistant               Outcome Measures    No documentation.                 Occupational Therapy Education                 Title: PT OT SLP Therapies (In Progress)     Topic: Occupational Therapy (In Progress)     Point:  ADL training (Not Started)     Description:   Instruct learner(s) on proper safety adaptation and remediation techniques during self care or transfers.   Instruct in proper use of assistive devices.              Learner Progress:  Not documented in this visit.          Point: Home exercise program (Not Started)     Description:   Instruct learner(s) on appropriate technique for monitoring, assisting and/or progressing therapeutic exercises/activities.              Learner Progress:  Not documented in this visit.          Point: Precautions (Done)     Description:   Instruct learner(s) on prescribed precautions during self-care and functional transfers.              Learning Progress Summary           Patient Acceptance, E,TB, VU by  at 1/12/2022 1553    Comment: POC, role of OT, transfer training   Family Acceptance, E,TB, VU by  at 1/12/2022 1553    Comment: POC, role of OT, transfer training                   Point: Body mechanics (Done)     Description:   Instruct learner(s) on proper positioning and spine alignment during self-care, functional mobility activities and/or exercises.              Learning Progress Summary           Patient Acceptance, E,TB, VU by  at 1/12/2022 1553    Comment: POC, role of OT, transfer training   Family Acceptance, E,TB, VU by  at 1/12/2022 1553    Comment: POC, role of OT, transfer training                               User Key     Initials Effective Dates Name Provider Type Discipline     06/14/21 -  Ottoniel Robles OT Occupational Therapist OT              OT Recommendation and Plan     Plan of Care Review  Plan of Care Reviewed With: patient  Progress: no change  Outcome Summary: Pt tolerated tx well this date. Pt was SBA with bed mobility. Pt was CGA/min A with sit-stand. Pt was CGA/min A with toilet t/f; SBA  with toileting. Pt gave good effort with UE ther ex. Continue OT POC.     Time Calculation:    Time Calculation- OT     Row Name 01/19/22 9642              Time Calculation- OT    OT Start Time 1400  -CS      OT Stop Time 1440  -CS      OT Time Calculation (min) 40 min  -CS      Total Timed Code Minutes- OT 40 minute(s)  -CS      OT Received On 01/19/22  -CS              Timed Charges    66196 - OT Therapeutic Exercise Minutes 25  -CS      14048 - OT Self Care/Mgmt Minutes 15  -CS              Total Minutes    Timed Charges Total Minutes 40  -CS       Total Minutes 40  -CS            User Key  (r) = Recorded By, (t) = Taken By, (c) = Cosigned By    Initials Name Provider Type     May Montez COTA Occupational Therapy Assistant              Therapy Charges for Today     Code Description Service Date Service Provider Modifiers Qty    08588975074 HC OT THER PROC EA 15 MIN 1/18/2022 May Montez COTA GO 2    57162529386 HC OT SELF CARE/MGMT/TRAIN EA 15 MIN 1/18/2022 May Montez COTA GO 2    48352422619 HC OT THER PROC EA 15 MIN 1/19/2022 May Montez COTA GO 2    40363858145 HC OT SELF CARE/MGMT/TRAIN EA 15 MIN 1/19/2022 May Montez COTA GO 1               ISHAN Smith  1/19/2022

## 2022-01-19 NOTE — PLAN OF CARE
Goal Outcome Evaluation:               PT rested well. Ambulates to bathroom, turns self in bed, manages trilogy.

## 2022-01-19 NOTE — PROGRESS NOTES
FAMILY MEDICINE DAILY PROGRESS NOTE  NAME: Yamileth Slater  : 1960  MRN: [id number]     LOS: 9 days     PROVIDER OF SERVICE: Paulina Solano MD    Chief Complaint: Physical deconditioning    Subjective:     Interval History:  History taken from: patient chart  Patient Complaints: Blurry eyesight, numbness in her legs  Patient Denies: Confusion    Patient was on toilet at time of exam.  She has been ambulating around the room by herself.  She will have dialysis today.  She was not accepted for any of our local skilled nursing facilities for rehab and will be going home today with home health after dialysis.  We will arrange outpatient follow-up for CGM placement and education.    Review of Systems:   Review of Systems   Constitutional: Negative for activity change and appetite change.   HENT: Negative for congestion and ear pain.    Eyes: Positive for visual disturbance. Negative for pain and discharge.   Respiratory: Negative for chest tightness and shortness of breath.    Cardiovascular: Negative for chest pain and palpitations.   Gastrointestinal: Negative for abdominal distention and abdominal pain.   Endocrine: Negative for cold intolerance and heat intolerance.   Genitourinary: Negative for difficulty urinating and dysuria.   Musculoskeletal: Negative for arthralgias and back pain.   Skin: Negative for color change and rash.   Allergic/Immunologic: Negative for environmental allergies and food allergies.   Neurological: Positive for weakness and numbness. Negative for dizziness and headaches.   Hematological: Negative for adenopathy. Does not bruise/bleed easily.   Psychiatric/Behavioral: Negative for agitation and confusion.       Objective:     Vital Signs  Temp:  [96.1 °F (35.6 °C)-97.4 °F (36.3 °C)] 97.4 °F (36.3 °C)  Heart Rate:  [58-70] 66  Resp:  [16-20] 16  BP: (131-142)/(60-82) 133/60    Physical Exam  Physical Exam  Vitals and nursing note reviewed.   Constitutional:       Appearance: She  is well-developed.   HENT:      Head: Normocephalic and atraumatic.      Mouth/Throat:      Mouth: Mucous membranes are moist.   Eyes:      Pupils: Pupils are equal, round, and reactive to light.   Neck:      Thyroid: No thyromegaly.      Vascular: No JVD.      Trachea: No tracheal deviation.   Cardiovascular:      Rate and Rhythm: Normal rate.      Pulses:           Radial pulses are 2+ on the right side and 2+ on the left side.        Dorsalis pedis pulses are 2+ on the right side and 2+ on the left side.      Heart sounds: Normal heart sounds, S1 normal and S2 normal.      Comments: Temporary dialysis catheter right chest wall  Pulmonary:      Effort: Pulmonary effort is normal.      Breath sounds: Normal breath sounds.   Abdominal:      General: Bowel sounds are normal.      Palpations: Abdomen is soft.   Musculoskeletal:         General: Normal range of motion.   Skin:     General: Skin is warm and dry.      Capillary Refill: Capillary refill takes 2 to 3 seconds.      Comments: Candidal rash anterior abdominal skin folds   Neurological:      Mental Status: She is alert and oriented to person, place, and time.      GCS: GCS eye subscore is 4. GCS verbal subscore is 5. GCS motor subscore is 6.   Psychiatric:         Speech: Speech normal.         Behavior: Behavior normal.         Thought Content: Thought content normal.         Medication Review    Current Facility-Administered Medications:   •  acetaminophen (TYLENOL) tablet 650 mg, 650 mg, Oral, Q4H PRN, 650 mg at 01/18/22 0913 **OR** acetaminophen (TYLENOL) 160 MG/5ML solution 650 mg, 650 mg, Oral, Q4H PRN **OR** acetaminophen (TYLENOL) suppository 650 mg, 650 mg, Rectal, Q4H PRN, Paulina Solano MD  •  albuterol (PROVENTIL) nebulizer solution 0.083% 2.5 mg/3mL, 2.5 mg, Nebulization, Q4H PRN, Paulina Solano MD  •  aspirin EC tablet 81 mg, 81 mg, Oral, Daily, Paulina Solano MD, 81 mg at 01/18/22 0902  •  atorvastatin (LIPITOR) tablet 20 mg,  20 mg, Oral, Daily, Paulina Solano MD, 20 mg at 01/18/22 0902  •  sennosides-docusate (PERICOLACE) 8.6-50 MG per tablet 2 tablet, 2 tablet, Oral, BID, 2 tablet at 01/18/22 2050 **AND** polyethylene glycol (MIRALAX) packet 17 g, 17 g, Oral, Daily PRN **AND** bisacodyl (DULCOLAX) EC tablet 5 mg, 5 mg, Oral, Daily PRN **AND** bisacodyl (DULCOLAX) suppository 10 mg, 10 mg, Rectal, Daily PRN, Paulina Solano MD  •  bumetanide (BUMEX) tablet 2 mg, 2 mg, Oral, Once per day on Sun Tue Thu Sat, Ace Lauren MD, 2 mg at 01/18/22 0902  •  clopidogrel (PLAVIX) tablet 75 mg, 75 mg, Oral, Daily, Paulina Solano MD, 75 mg at 01/18/22 0904  •  dextrose (D50W) (25 g/50 mL) IV injection 12.5 g, 12.5 g, Intravenous, PRN, Paulina Solano MD  •  dextrose (D50W) (25 g/50 mL) IV injection 25 g, 25 g, Intravenous, Q15 Min PRN, Paulina Solano MD  •  dextrose (GLUTOSE) oral gel 15 g, 15 g, Oral, Q15 Min PRN, Paulina Solano MD  •  epoetin hubert-epbx (RETACRIT) injection 10,000 Units, 10,000 Units, Intravenous, Once per day on Mon Wed Fri, Ace Lauren MD, 10,000 Units at 01/17/22 1715  •  gabapentin (NEURONTIN) capsule 300 mg, 300 mg, Oral, Q8H, Paulina Solano MD, 300 mg at 01/19/22 0629  •  glucagon (human recombinant) (GLUCAGEN DIAGNOSTIC) injection 1 mg, 1 mg, Subcutaneous, Q15 Min PRN, Paulina Solano MD  •  heparin (porcine) 5000 UNIT/ML injection 5,000 Units, 5,000 Units, Subcutaneous, Q8H, Paulina Solano MD, 5,000 Units at 01/19/22 0629  •  heparin (porcine) injection 2,000 Units, 2,000 Units, Intracatheter, PRN, Ace Lauren MD, 2,000 Units at 01/17/22 1400  •  heparin (porcine) injection 4,000 Units, 4,000 Units, Intracatheter, PRN, Timothy Valle MD, 4,000 Units at 01/17/22 1742  •  hydrALAZINE (APRESOLINE) injection 10 mg, 10 mg, Intravenous, Q6H PRN, Paulina Solano MD, 10 mg at 01/09/22 1628  •  HYDROcodone-acetaminophen (NORCO) 5-325 MG per tablet 1 tablet, 1  tablet, Oral, Q6H PRN, Ace Lauren MD, 1 tablet at 01/18/22 2051  •  hydrocortisone-bacitracin-zinc oxide-nystatin (MAGIC BARRIER) ointment 1 application, 1 application, Topical, PRN, Paulina Solano MD, 1 application at 01/18/22 2053  •  insulin aspart (novoLOG) injection 0-24 Units, 0-24 Units, Subcutaneous, TID AC, Paulina Solano MD, 12 Units at 01/18/22 1229  •  insulin aspart (novoLOG) injection 30 Units, 30 Units, Subcutaneous, TID With Meals, Paulina Solano MD, 30 Units at 01/18/22 1228  •  insulin detemir (LEVEMIR) injection 30 Units, 30 Units, Subcutaneous, Daily, Luz Quijano MD, 30 Units at 01/18/22 0918  •  insulin detemir (LEVEMIR) injection 35 Units, 35 Units, Subcutaneous, Nightly, Luz Quijano MD, 35 Units at 01/17/22 2032  •  ipratropium-albuterol (DUO-NEB) nebulizer solution 3 mL, 3 mL, Nebulization, 4x Daily - RT, Paulina Solano MD, 3 mL at 01/19/22 0717  •  isosorbide mononitrate (IMDUR) 24 hr tablet 30 mg, 30 mg, Oral, QAM, Paulina Solano MD, 30 mg at 01/19/22 0629  •  labetalol (NORMODYNE,TRANDATE) injection 20 mg, 20 mg, Intravenous, Q6H PRN, Paulina Solano MD, 20 mg at 01/09/22 1737  •  levothyroxine (SYNTHROID, LEVOTHROID) tablet 25 mcg, 25 mcg, Oral, Daily, Paulina Solano MD, 25 mcg at 01/18/22 1000  •  lidocaine (LIDODERM) 5 % 1 patch, 1 patch, Transdermal, Q24H, Luz Quijano MD, 1 patch at 01/18/22 0913  •  lisinopril (PRINIVIL,ZESTRIL) tablet 5 mg, 5 mg, Oral, Daily, Paulina Solano MD, 5 mg at 01/18/22 0912  •  metoprolol tartrate (LOPRESSOR) tablet 25 mg, 25 mg, Oral, Q12H, Ace Lauren MD, 25 mg at 01/18/22 2050  •  midodrine (PROAMATINE) tablet 5 mg, 5 mg, Oral, Once per day on Mon Wed Fri, Ace Lauren MD, 5 mg at 01/17/22 1132  •  montelukast (SINGULAIR) tablet 10 mg, 10 mg, Oral, Nightly, Paulina Solano MD, 10 mg at 01/18/22 2050  •  muscle rub (BenGay) 10-15 % cream 1 application, 1 application, Topical, Q1H PRN,  Luz Quijano MD, 1 application at 01/18/22 0906  •  nystatin (MYCOSTATIN) powder, , Topical, Q12H, Luz Quijano MD, Given at 01/18/22 2053  •  O2 (OXYGEN), 3 L/min, Inhalation, Continuous, Paulina Solano MD, Last Rate: 180,000 mL/hr at 01/09/22 1809, 3 L/min at 01/09/22 1809  •  ondansetron (ZOFRAN) injection 4 mg, 4 mg, Intravenous, Q6H PRN, Paulina Solano MD, 4 mg at 01/16/22 1341  •  oxybutynin XL (DITROPAN-XL) 24 hr tablet 5 mg, 5 mg, Oral, Nightly, Luz Quijano MD  •  pantoprazole (PROTONIX) EC tablet 40 mg, 40 mg, Oral, Q AM, Luz Quijano MD, 40 mg at 01/19/22 0629  •  ranolazine (RANEXA) 12 hr tablet 500 mg, 500 mg, Oral, Q12H, Paulian Solano MD, 500 mg at 01/18/22 2050  •  sodium chloride 0.9 % flush 10 mL, 10 mL, Intravenous, PRN, Paulina Solano MD  •  sodium chloride 0.9 % flush 10 mL, 10 mL, Intravenous, Once PRN, Paulina Solano MD  •  sodium chloride 0.9 % flush 10 mL, 10 mL, Intravenous, Q12H, Paulina Solano MD, 10 mL at 01/18/22 2051  •  sodium chloride 0.9 % flush 10 mL, 10 mL, Intravenous, PRN, Paulina Solano MD  •  sodium chloride 0.9 % infusion, 10 mL/hr, Intravenous, Continuous PRN, Paulina Solano MD     Diagnostic Data    Lab Results (last 24 hours)     Procedure Component Value Units Date/Time    POC Glucose Once [[id number]]  (Abnormal) Collected: 01/19/22 0606    Specimen: Blood Updated: 01/19/22 0621     Glucose 229 mg/dL      Comment: RN NotifiedOperator: [id number] TASHI Brian ID: [id number]       Basic Metabolic Panel [[id number]]  (Abnormal) Collected: 01/19/22 0537    Specimen: Blood Updated: 01/19/22 0612     Glucose 238 mg/dL      BUN 46 mg/dL      Creatinine 6.25 mg/dL      Sodium 129 mmol/L      Potassium 5.3 mmol/L      Chloride 92 mmol/L      CO2 23.0 mmol/L      Calcium 8.8 mg/dL      eGFR   Amer --     Comment: <15 Indicative of kidney failure.        eGFR Non African Amer 7 mL/min/1.73      Comment: <15  Indicative of kidney failure.        BUN/Creatinine Ratio 7.4     Anion Gap 14.0 mmol/L     Narrative:      GFR Normal >60  Chronic Kidney Disease <60  Kidney Failure <15      CBC (No Diff) [[id number]]  (Abnormal) Collected: 01/19/22 0537    Specimen: Blood Updated: 01/19/22 0553     WBC 6.31 10*3/mm3      RBC 2.74 10*6/mm3      Hemoglobin 7.6 g/dL      Hematocrit 23.4 %      MCV 85.4 fL      MCH 27.7 pg      MCHC 32.5 g/dL      RDW 14.4 %      RDW-SD 44.5 fl      MPV 8.6 fL      Platelets 311 10*3/mm3     POC Glucose Once [[id number]]  (Abnormal) Collected: 01/1960    Specimen: Blood Updated: 01/18/22 2027     Glucose 142 mg/dL      Comment: RN NotifiedOperator: [id number] AKINS JORDANMeter ID: [id number]       POC Glucose Once [[id number]]  (Normal) Collected: 01/18/22 1637    Specimen: Blood Updated: 01/18/22 1707     Glucose 73 mg/dL      Comment: RN NotifiedOperator: [id number] GUEVARA CRYSTALMeter ID: [id number]       POC Glucose Once [[id number]]  (Abnormal) Collected: 01/18/22 1044    Specimen: Blood Updated: 01/18/22 1101     Glucose 277 mg/dL      Comment: RN NotifiedOperator: [id number] GUEVARA CRYSTALMeter ID: [id number]       Blood Culture - Blood, Arm, Left [[id number]]  (Normal) Collected: 01/15/22 0719    Specimen: Blood from Arm, Left Updated: 01/18/22 0730     Blood Culture No growth at 3 days    Blood Culture - Blood, Arm, Right [[id number]]  (Normal) Collected: 01/15/22 0725    Specimen: Blood from Arm, Right Updated: 01/18/22 0730     Blood Culture No growth at 3 days           Imaging Results (Last 24 Hours)     ** No results found for the last 24 hours. **          I reviewed the patient's new clinical results.    Assessment/Plan:     Active Hospital Problems    Diagnosis    • **Physical deconditioning      - PT/OT   - Home health with PT     • Type 2 diabetes mellitus with kidney complication, with long-term current use of insulin (HCC)      - Levemir 30units AM. 35 units PM  -  Novolog 30 with meals  - SSI  -HgA1c 10/2021 : 8.80  -CGM on dischrge       • Personal history of noncompliance with medical treatment, presenting hazards to health      · Difficulty showing up to clinic visits due to complexity of medical problems and transportation  · Would really benefit from a couple home visits from PCP to help improve compliance       • COPD (chronic obstructive pulmonary disease) (HCC)      - Dependent on 3L NC at Home  - Maintain strict sats between 88-92%  -Singulair 10mg   -DuoNebs  -NIPPV via Respiratory. Patient uses Trilogy at home/night      • Anemia due to chronic kidney disease, on chronic dialysis (HCC)      - MWF Dialysis patient - Nephrology following   - Epoetin (Retacrit) 10,000 units three times a week on dialysis days MWF  - Hg 7 on admission. Transfuse if Hg less than 7   - 1/14: Hg 8   - 1/15: Hg 7.7   - Daily CBC   - Type and screen done   -1/15 : Tunnel Catheter possibly need to be replaced after next HD session on Monday due to poor catheter flow vs loss of function. Will coordinate with nephrology if catheter can be replaced sooner as patient is awaiting SNF placement.            • Unspecified diastolic (congestive) heart failure (HCC)      - Continue Imdur, metoprolol, lisinopril, bumex  - pro-BNP 60562 on admission  - Echo 1/10/2022 : EF 61-65%  -Cardiology on board (Dr. Handy) - appreciate recommendations  - MWF Dialysis patient     • MIKE and COPD overlap syndrome (HCC)      · Home Trilogy/CPAP  · Home 3L oxygen dependent. Maintain strict Sats between 88-92%      • Hypothyroidism      Continue home Levothyroxine 25mcg, stable     • Fall      - witnessed fall without head trauma on 1/13  - No LOC; Patient states her legs gave out  - 1/9: CT head for AMS which has since resolved.    - 1/13 : Repeat CT of head - No acute intracranial pathology. Unchanged since admission CT    - 1/14: Patient AOx4 on exam, Lidocaine patch/ bengay for buttock discomfort   - 1/15 : patient  AO x4   -1/13: Xray pelvis   -No gross displaced fracture evident   - 1/15: Bengay for buttock pain has helped with pain      - MRI unable to be done due to body habitus          • Coronary artery disease      · S/P CABG x 3 (Primary), Bilateral carotid artery stenosis w/ 2 stents on left side,  PAD (peripheral artery disease) with stent in right upper leg  · Continue   - aspirin 81mg   - Plavix 75mg daily  - atorvastatin 20 mg daily  - lisinopril 5mg daily  -lopressor 25mg BID  -Imdur 30mg daily  - Ranexa - 500mg BID   · EKG - sinus tachycardia             • Hypertension        · Metoprolol 25mg bid, hold if HR < 60bpm  · Lisinopril 5mg daily  · Telemetry  · Hydralazine 10mg prn q6h for SBP > 170  · Labetalol 20mg Q6h >160             DVT prophylaxis: Heparin  Code Status and Medical Interventions:   Ordered at: 01/09/22 1508     Level Of Support Discussed With:    Next of Kin (If No Surrogate)     Code Status (Patient has no pulse and is not breathing):    CPR (Attempt to Resuscitate)     Medical Interventions (Patient has pulse or is breathing):    Full Support     Comments:    Discussed with  Huy Slater at bedside       Plan for disposition:Where: home health and When:  today After dialysis      Time: 25 minutes     This document has been electronically signed by Paulina Solano MD on January 19, 2022 07:28 CST    Paulina Solano MD PGY-3  Part of this note may be an electronic transcription/translation of spoken language to printed text using the Dragon Dictation System.

## 2022-01-19 NOTE — SIGNIFICANT NOTE
JAROCHO Tracey Quan at 0840 answered a call light in room 382..Pt was found laying on her right side in the bathroom.pt was alert and no s/sx of injury but did say she hit her head,right hip and right shoulder,so out of error of caution.All ct and xray's were negative for anything acute.pt had previous signed a waiver of alarms and informed staff she didn't want anyone helping her to the bathroom,she could do it herself,despite staffs advice.Yesterday she was told she would be discharging and she was given a paper with a list of nursing homes and the ones in town either refused her for whatever reasons and the others had no beds.Pt was quite upset and bothered by that conversation placing a lot of calls to her family about her predicament and to staff members.pt refuses to wear socks also.MD Solano was notified.Staff feels this was an intentional fall to stay longer in the hospital.Spouse was notified by her about the fall and sveral other family members

## 2022-01-19 NOTE — PROGRESS NOTES
"[facility]  [address] [place] [address]. [address]  T - [phone]  F - [phone]     Progress Note          PATIENT  DEMOGRAPHICS   PATIENT NAME: Yamileth Slater                      PHYSICIAN: Ace Lauren MD  : 1960  MRN: [id number]   LOS: 9 days    Patient Care Team:  Rianna Macias MD as PCP - General (Family Medicine)  Subjective   SUBJECTIVE   No soa, had fall this am       Objective   OBJECTIVE   Vital Signs  Temp:  [97.1 °F (36.2 °C)-97.4 °F (36.3 °C)] 97.3 °F (36.3 °C)  Heart Rate:  [58-71] 66  Resp:  [16-20] 18  BP: (124-168)/(60-82) 168/68    Flowsheet Rows      First Filed Value   Admission Height 167.6 cm (66\") Documented at 2022 1304   Admission Weight 132 kg (291 lb 9.6 oz) Documented at 2022 1048           I/O last 3 completed shifts:  In: 1080 [P.O.:1080]  Out: 5100 [Urine:2100; Other:3000]    PHYSICAL EXAM    Physical Exam  Constitutional:       Appearance: She is well-developed.   HENT:      Head: Normocephalic.   Eyes:      Pupils: Pupils are equal, round, and reactive to light.   Cardiovascular:      Rate and Rhythm: Normal rate and regular rhythm.      Heart sounds: Normal heart sounds.   Pulmonary:      Effort: Pulmonary effort is normal.      Breath sounds: Normal breath sounds.   Abdominal:      General: Bowel sounds are normal.      Palpations: Abdomen is soft.   Musculoskeletal:         General: No swelling.   Skin:     Coloration: Skin is not jaundiced.   Neurological:      Mental Status: She is alert and oriented to person, place, and time.         RESULTS   Results Review:    Results from last 7 days   Lab Units 22  0537 22  0623 22  0606   SODIUM mmol/L 129* 127* 125*   POTASSIUM mmol/L 5.3* 5.1 4.8   CHLORIDE mmol/L 92* 90* 89*   CO2 mmol/L 23.0 22.0 21.0*   BUN mg/dL 46* 40* 53*   CREATININE mg/dL 6.25* 5.88* 7.96*   CALCIUM mg/dL 8.8 8.8 8.3*   GLUCOSE mg/dL 238* 221* 233*       Estimated Creatinine " Clearance: 13 mL/min (A) (by C-G formula based on SCr of 6.25 mg/dL (H)).    Results from last 7 days   Lab Units 01/13/22  0621 01/12/22  1950 01/12/22  1634   PHOSPHORUS mg/dL 6.0* 4.1 4.0             Results from last 7 days   Lab Units 01/19/22  0537 01/18/22  0623 01/17/22  0606 01/16/22  0701 01/15/22  0719   WBC 10*3/mm3 6.31 6.73 6.84 6.84 8.68   HEMOGLOBIN g/dL 7.6* 8.1* 7.5* 7.6* 7.7*   PLATELETS 10*3/mm3 311 351 271 266 225               Imaging Results (Last 24 Hours)     Procedure Component Value Units Date/Time    XR Shoulder 2+ View Right [[id number]] Collected: 01/19/22 0925     Updated: 01/19/22 1023    Narrative:          PROCEDURE:  Right  Shoulder 3 views.    REASON FOR EXAM:  fall, pain right shoulder, E13.11 Other  specified diabetes mellitus with ketoacidosis with coma I10  Essential (primary) hypertension R41.82 Altered mental status,  unspecified R13.10 Dysphagia, unspecified Z74.09 Other reduced  mobility Z74.09 Other reduced mobility Z78.9 Other specified  health status R53.81 Other malaise    FINDINGS: Right shoulder bony structures are normal. There is no  evidence of fracture dislocation. Soft tissue structures are  normal.        Impression:      Negative shoulder.    Electronically signed by:  Young Johnson MD  1/19/2022 10:22 AM [facility]  Workstation: [id number]    XR Hip With or Without Pelvis 2 - 3 View Right [[id number]] Collected: 01/19/22 0932     Updated: 01/19/22 1022    Narrative:      Procedure: AP pelvis and right hip two views    Reason for exam: Fall with right hip pain.    FINDINGS: Comparison exam dated January 13, 2022. Bony structures  of the AP pelvis and right hip are normal. There is no evidence  of fracture or dislocation. Soft tissue structures are normal.  Atherosclerotic vascular calcifications are seen in the region of  the pelvis and upper bilateral legs.      Impression:      No acute AP pelvis or right hip abnormality.    Electronically signed by:  Young Johnson MD   1/19/2022 10:20 AM CST  Workstation: [id number]    CT Head Without Contrast [[id number]] Collected: 01/19/22 0952     Updated: 01/19/22 1009    Narrative:      EXAM: CT HEAD WO CONTRAST    DATE: 1/19/2022 9:52 AM CST    TECHNIQUE: Axial images were obtained at 5 mm intervals from the  level of the skull base to the vertex. No IV contrast was  administered. CTDI 50.10    This exam was performed according to our departmental  dose-optimization program, which includes automated exposure  control, adjustment of the mA and/or kV according to patient size  and/or use of iterative reconstruction technique.    CLINICAL INDICATION: fall, hit head, E13.11 Other specified  diabetes mellitus with ketoacidosis with coma I10 Essential  (primary) hypertension R41.82 Altered mental status, unspecified  R13.10 Dysphagia, unspecified Z74.09 Other reduced mobility  Z74.09 Other reduced mobility Z78.9 Other specified health status  R53.81 Other malaise    COMPARISON: None available.    FINDINGS: There is generalized cortical atrophy noted. There is  decreased attenuation in the periventricular deep white matter  compatible with chronic ischemic microvascular disease. There is  no definitive evidence of acute macro infarction. No evidence of  an intracranial mass or hemorrhage is seen. The ventricles are  normal in size and configuration.    Bone window images demonstrate no acute osseous abnormality.  There is fluid throughout the mastoid air cells on the left.        Impression:      1. Chronic changes with generalized cortical atrophy and evidence  of chronic ischemic microvascular disease.  2. No radiographic evidence of acute intracranial pathology.  3. Findings as above indicative of mastoiditis on the left.    Electronically signed by:  Hilda Orellana MD  1/19/2022 10:07 AM  CST Workstation: [id number]           MEDICATIONS    aspirin, 81 mg, Oral, Daily  atorvastatin, 20 mg, Oral, Daily  bumetanide, 2 mg, Oral, Once per day on  Sun Tue Thu Sat  clopidogrel, 75 mg, Oral, Daily  epoetin hubert/hubert-epbx, 10,000 Units, Intravenous, Once per day on Mon Wed Fri  gabapentin, 300 mg, Oral, Q8H  heparin (porcine), 5,000 Units, Subcutaneous, Q8H  insulin aspart, 0-24 Units, Subcutaneous, TID AC  insulin aspart, 30 Units, Subcutaneous, TID With Meals  insulin detemir, 30 Units, Subcutaneous, Daily  insulin detemir, 35 Units, Subcutaneous, Nightly  ipratropium-albuterol, 3 mL, Nebulization, 4x Daily - RT  isosorbide mononitrate, 30 mg, Oral, QAM  levothyroxine, 25 mcg, Oral, Daily  lidocaine, 1 patch, Transdermal, Q24H  lisinopril, 5 mg, Oral, Daily  metoprolol tartrate, 25 mg, Oral, Q12H  midodrine, 5 mg, Oral, Once per day on Mon Wed Fri  montelukast, 10 mg, Oral, Nightly  nystatin, , Topical, Q12H  oxybutynin XL, 5 mg, Oral, Nightly  pantoprazole, 40 mg, Oral, Q AM  ranolazine, 500 mg, Oral, Q12H  senna-docusate sodium, 2 tablet, Oral, BID  sodium chloride, 10 mL, Intravenous, Q12H      O2, 3 L/min, Last Rate: 3 L/min (01/09/22 1809)  sodium chloride, 10 mL/hr        Assessment/Plan   ASSESSMENT / PLAN      Physical deconditioning    Hypertension    COPD (chronic obstructive pulmonary disease) (HCC)    Coronary artery disease    Hypothyroidism    MIKE and COPD overlap syndrome ([facility])    Unspecified diastolic (congestive) heart failure (HCC)    Anemia due to chronic kidney disease, on chronic dialysis ([facility])    Personal history of noncompliance with medical treatment, presenting hazards to health    Type 2 diabetes mellitus with kidney complication, with long-term current use of insulin ([facility])    1.  ESRD on HD- Initiated HD on 10/21 due to hyperkalemia and fluid overload, now  ESRD.  HD MWF for now.  Keep bumex 2mg on non dialysis days. Euvolemic at present. Cr much higher this admission pre dialysis than it used to be ?due to poor catheter flow vs loss of function.    -keep midodrine pre dialysis. Catheter worked well after alteplase and able to get  300 - 350 blood flow. Hd today. Plan for snf noted.      2.  Hyponatremia- in the setting of severe hyperglycemia, hyperosmolar hyponatremia. Now better with glucose correction     3.  HHS-started on insulin drip, managed per primary team. Off insulin drip. On aspart and levemir     4.  History of CAD     5.  History of COPD      6.  Anemia / previous GI bleed / b12 deficiency-  s/p capsule endoscopy which showed few small non-bleeding intestinal AVMs and single small polyp. Hgb slowly dropping.     7.  HTN- Continue home antihypertensives. Metoprolol lowered to 25mg    8. Fall - ct head negative.            This document has been electronically signed by Ace Lauren MD on January 19, 2022 11:28 CST

## 2022-01-19 NOTE — PLAN OF CARE
Goal Outcome Evaluation:  Plan of Care Reviewed With: patient        Progress: no change  Outcome Summary: Pt tolerated tx well this date. Pt was SBA with bed mobility. Pt was CGA/min A with sit-stand. Pt was CGA/min A with toilet t/f; SBA  with toileting. Pt gave good effort with UE ther ex. Continue OT POC.

## 2022-01-19 NOTE — PAYOR COMM NOTE
"    Karmen Colorado RN [facility]  [phone]    Phone  [phone]      Fax  Cont. Stay review      Yamileth Slater (61 y.o. 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                 Active Insurance as of 1/9/2022     Primary Coverage     Payor Plan Insurance Group Employer/Plan Group    ANTHEM MEDICARE REPLACEMENT ANTHEM MEDICARE ADVANTAGE [id number]     Payor Plan Address Payor Plan Phone Number Payor Plan Fax Number Effective Dates    [address] [phone]  1/1/2022 - None Entered    [facility] [id number]       Subscriber Name Subscriber Birth Date Member ID       YAMILETH SLATER 1960 [id number]           Secondary Coverage     Payor Plan Insurance Group Employer/Plan Group    [place]      Payor Plan Address Payor Plan Phone Number Payor Plan Fax Number Effective Dates    [address] [phone]  6/28/2019 - None Entered    [facility] [id number]       Subscriber Name Subscriber Birth Date Member ID       YAMILETH SLATER 1960 [id number]                 Emergency Contacts      (Rel.) Home Phone Work Phone Mobile Phone    " Yamileth Chavez (Daughter) [phone] -- [phone]    Suzanne Rivera (Daughter) [phone] -- [phone]    Huy Slater (Spouse) [phone] -- [phone]            Vital Signs (last day)     Date/Time Temp Temp src Pulse Resp BP Patient Position SpO2    01/19/22 0849 -- -- 68 -- -- -- --    01/19/22 0841 97.3 (36.3) Temporal 71 18 168/68 Sitting 97    01/19/22 0806 97.2 (36.2) Temporal 66 16 124/61 Sitting 100    01/19/22 0726 -- -- 66 -- -- -- --    01/19/22 0717 -- -- 67 16 -- -- 98    01/19/22 0300 97.4 (36.3) Oral 70 18 133/60 Lying 100    01/19/22 0117 -- -- 67 -- -- -- --    01/18/22 2111 -- -- 66 -- -- -- 100    01/18/22 2104 -- -- 66 17 -- -- 99    01/18/22 1900 97.1 (36.2) Temporal 68 18 136/82 -- 98    01/18/22 1551 -- -- 64 -- -- -- --    01/18/22 1528 97.3 (36.3) Temporal 66 18 142/65 Sitting 99    01/18/22 1445 -- -- 60 20 -- -- 100    01/18/22 1438 -- -- 58 20 -- -- 98    01/18/22 1108 -- -- 61 20 -- -- 100    01/18/22 1103 -- -- 62 20 -- -- 96    01/18/22 0838 -- -- 62 -- -- -- --    01/18/22 0738 96.1 (35.6) Temporal 62 18 131/60 Sitting 100    01/18/22 0710 -- -- -- 22 -- -- --    01/18/22 0700 -- -- 59 20 -- -- 89    01/18/22 0314 97 (36.1) Temporal 59 22 153/71 Lying 100    01/18/22 0140 -- -- 58 -- -- -- --          Oxygen Therapy (last day)     Date/Time SpO2 Device (Oxygen Therapy) Flow (L/min) Oxygen Concentration (%) ETCO2 (mmHg)    01/19/22 0841 97 room air -- -- --    01/19/22 0806 100 nasal cannula -- -- --    01/19/22 0726 -- nasal cannula 3 -- --    01/19/22 0717 98 room air -- -- --    01/19/22 0300 100 nasal cannula 3 -- --    01/18/22 2111 100 nasal cannula 3 -- --    01/18/22 2104 99 nasal cannula 3 -- --    01/18/22 1900 98 -- -- -- --    01/18/22 1528 99 nasal cannula -- -- --    01/18/22 1445 100 nasal cannula 3 -- --    01/18/22 1438 98 nasal cannula 3 -- --    01/18/22 1419 -- nasal cannula 3 -- --    01/18/22 1108 100 nasal cannula 3 -- --    01/18/22 1103 96 nasal  cannula 3 -- --    01/18/22 0738 100 nasal cannula -- -- --    01/18/22 0710 -- nasal cannula 3 -- --    01/18/22 0700 89 room air -- -- --    01/18/22 0314 100 room air -- -- --          Current Facility-Administered Medications   Medication Dose Route Frequency Provider Last Rate Last Admin   • acetaminophen (TYLENOL) tablet 650 mg  650 mg Oral Q4H PRN Paulina Solano MD   650 mg at 01/18/22 0913    Or   • acetaminophen (TYLENOL) 160 MG/5ML solution 650 mg  650 mg Oral Q4H PRN Paulina Solano MD        Or   • acetaminophen (TYLENOL) suppository 650 mg  650 mg Rectal Q4H PRN Paulina Solano MD       • albuterol (PROVENTIL) nebulizer solution 0.083% 2.5 mg/3mL  2.5 mg Nebulization Q4H PRN Paulina Solano MD       • aspirin EC tablet 81 mg  81 mg Oral Daily Paulina Solano MD   81 mg at 01/19/22 0844   • atorvastatin (LIPITOR) tablet 20 mg  20 mg Oral Daily Paulina Solano MD   20 mg at 01/19/22 0844   • sennosides-docusate (PERICOLACE) 8.6-50 MG per tablet 2 tablet  2 tablet Oral BID Paulina Solano MD   2 tablet at 01/19/22 0844    And   • polyethylene glycol (MIRALAX) packet 17 g  17 g Oral Daily PRN Paulina Solano MD        And   • bisacodyl (DULCOLAX) EC tablet 5 mg  5 mg Oral Daily PRN Paulina Solano MD        And   • bisacodyl (DULCOLAX) suppository 10 mg  10 mg Rectal Daily PRN Paulina Solano MD       • bumetanide (BUMEX) tablet 2 mg  2 mg Oral Once per day on Sun Tue Thu Ace Ng MD   2 mg at 01/18/22 0902   • clopidogrel (PLAVIX) tablet 75 mg  75 mg Oral Daily Paulina Solano MD   75 mg at 01/19/22 0844   • dextrose (D50W) (25 g/50 mL) IV injection 12.5 g  12.5 g Intravenous PRN Paulina Solano MD       • dextrose (D50W) (25 g/50 mL) IV injection 25 g  25 g Intravenous Q15 Min PRN Paulina Solano MD       • dextrose (GLUTOSE) oral gel 15 g  15 g Oral Q15 Min PRN Paulina Solano MD       • epoetin hubert-epbx (RETACRIT)  injection 10,000 Units  10,000 Units Intravenous Once per day on Mon Wed Fri Ace Lauren MD   10,000 Units at 01/17/22 1715   • gabapentin (NEURONTIN) capsule 300 mg  300 mg Oral Q8H Paulina Solano MD   300 mg at 01/19/22 0629   • glucagon (human recombinant) (GLUCAGEN DIAGNOSTIC) injection 1 mg  1 mg Subcutaneous Q15 Min PRN Paulina Solano MD       • heparin (porcine) 5000 UNIT/ML injection 5,000 Units  5,000 Units Subcutaneous Q8H Paulina Solano MD   5,000 Units at 01/19/22 0629   • heparin (porcine) injection 2,000 Units  2,000 Units Intracatheter PRN Ace Lauren MD   2,000 Units at 01/17/22 1400   • heparin (porcine) injection 4,000 Units  4,000 Units Intracatheter PRN Timothy Valle MD   4,000 Units at 01/17/22 1742   • hydrALAZINE (APRESOLINE) injection 10 mg  10 mg Intravenous Q6H PRN Paulina Solano MD   10 mg at 01/09/22 1628   • HYDROcodone-acetaminophen (NORCO) 5-325 MG per tablet 1 tablet  1 tablet Oral Q6H PRN Ace Lauren MD   1 tablet at 01/18/22 2051   • hydrocortisone-bacitracin-zinc oxide-nystatin (MAGIC BARRIER) ointment 1 application  1 application Topical PRN Paulina Solano MD   1 application at 01/19/22 0847   • insulin aspart (novoLOG) injection 0-24 Units  0-24 Units Subcutaneous TID AC Paulina Solano MD   12 Units at 01/18/22 1229   • insulin aspart (novoLOG) injection 30 Units  30 Units Subcutaneous TID With Meals Paulina Solano MD   30 Units at 01/19/22 0844   • insulin detemir (LEVEMIR) injection 30 Units  30 Units Subcutaneous Daily Luz Quijano MD   30 Units at 01/19/22 0901   • insulin detemir (LEVEMIR) injection 35 Units  35 Units Subcutaneous Nightly Luz Quijano MD   35 Units at 01/17/22 2032   • ipratropium-albuterol (DUO-NEB) nebulizer solution 3 mL  3 mL Nebulization 4x Daily - RT Paulina Solano MD   3 mL at 01/19/22 0717   • isosorbide mononitrate (IMDUR) 24 hr tablet 30 mg  30 mg Oral Paulina Morejon MD    30 mg at 01/19/22 0629   • labetalol (NORMODYNE,TRANDATE) injection 20 mg  20 mg Intravenous Q6H PRN Paulina Solano MD   20 mg at 01/09/22 1737   • levothyroxine (SYNTHROID, LEVOTHROID) tablet 25 mcg  25 mcg Oral Daily Paulina Solano MD   25 mcg at 01/19/22 0844   • lidocaine (LIDODERM) 5 % 1 patch  1 patch Transdermal Q24H Luz Quijano MD   1 patch at 01/19/22 0844   • lisinopril (PRINIVIL,ZESTRIL) tablet 5 mg  5 mg Oral Daily Paulina Solano MD   5 mg at 01/19/22 0843   • metoprolol tartrate (LOPRESSOR) tablet 25 mg  25 mg Oral Q12H Ace Lauren MD   25 mg at 01/19/22 0844   • midodrine (PROAMATINE) tablet 5 mg  5 mg Oral Once per day on Mon Wed Fri Ace Lauren MD   5 mg at 01/19/22 0843   • montelukast (SINGULAIR) tablet 10 mg  10 mg Oral Nightly Paulina Solano MD   10 mg at 01/18/22 2050   • muscle rub (BenGay) 10-15 % cream 1 application  1 application Topical Q1H PRN Luz Quijano MD   1 application at 01/18/22 0906   • nystatin (MYCOSTATIN) powder   Topical Q12H Luz Quijano MD   Given at 01/19/22 0847   • O2 (OXYGEN)  3 L/min Inhalation Continuous Paulina Solano ,000 mL/hr at 01/09/22 1809 3 L/min at 01/09/22 1809   • ondansetron (ZOFRAN) injection 4 mg  4 mg Intravenous Q6H PRN Paulina Solano MD   4 mg at 01/16/22 1341   • oxybutynin XL (DITROPAN-XL) 24 hr tablet 5 mg  5 mg Oral Nightly Luz Quijano MD       • pantoprazole (PROTONIX) EC tablet 40 mg  40 mg Oral Q AM Luz Quijano MD   40 mg at 01/19/22 0629   • ranolazine (RANEXA) 12 hr tablet 500 mg  500 mg Oral Q12H Paulina Solano MD   500 mg at 01/19/22 0844   • sodium chloride 0.9 % flush 10 mL  10 mL Intravenous PRN Paulina Solano MD       • sodium chloride 0.9 % flush 10 mL  10 mL Intravenous Once PRN Paulina Solano MD       • sodium chloride 0.9 % flush 10 mL  10 mL Intravenous Q12H Paulina Solano MD   10 mL at 01/18/22 2051   • sodium chloride 0.9 % flush 10 mL  10 mL  Intravenous PRN Paulina Solano MD       • sodium chloride 0.9 % infusion  10 mL/hr Intravenous Continuous PRN Paulina Solano MD            Physician Progress Notes (last 48 hours)      Paulina Solano MD at 22 0710          FAMILY MEDICINE DAILY PROGRESS NOTE  NAME: Yamileth Slater  : 1960  MRN: [id number]     LOS: 9 days     PROVIDER OF SERVICE: Paulina Solano MD    Chief Complaint: Physical deconditioning    Subjective:     Interval History:  History taken from: patient chart  Patient Complaints: Blurry eyesight, numbness in her legs  Patient Denies: Confusion    Patient was on toilet at time of exam.  She has been ambulating around the room by herself.  She will have dialysis today.  She was not accepted for any of our local skilled nursing facilities for rehab and will be going home today with home health after dialysis.  We will arrange outpatient follow-up for CGM placement and education.    Review of Systems:   Review of Systems   Constitutional: Negative for activity change and appetite change.   HENT: Negative for congestion and ear pain.    Eyes: Positive for visual disturbance. Negative for pain and discharge.   Respiratory: Negative for chest tightness and shortness of breath.    Cardiovascular: Negative for chest pain and palpitations.   Gastrointestinal: Negative for abdominal distention and abdominal pain.   Endocrine: Negative for cold intolerance and heat intolerance.   Genitourinary: Negative for difficulty urinating and dysuria.   Musculoskeletal: Negative for arthralgias and back pain.   Skin: Negative for color change and rash.   Allergic/Immunologic: Negative for environmental allergies and food allergies.   Neurological: Positive for weakness and numbness. Negative for dizziness and headaches.   Hematological: Negative for adenopathy. Does not bruise/bleed easily.   Psychiatric/Behavioral: Negative for agitation and confusion.       Objective:     Vital  Signs  Temp:  [96.1 °F (35.6 °C)-97.4 °F (36.3 °C)] 97.4 °F (36.3 °C)  Heart Rate:  [58-70] 66  Resp:  [16-20] 16  BP: (131-142)/(60-82) 133/60    Physical Exam  Physical Exam  Vitals and nursing note reviewed.   Constitutional:       Appearance: She is well-developed.   HENT:      Head: Normocephalic and atraumatic.      Mouth/Throat:      Mouth: Mucous membranes are moist.   Eyes:      Pupils: Pupils are equal, round, and reactive to light.   Neck:      Thyroid: No thyromegaly.      Vascular: No JVD.      Trachea: No tracheal deviation.   Cardiovascular:      Rate and Rhythm: Normal rate.      Pulses:           Radial pulses are 2+ on the right side and 2+ on the left side.        Dorsalis pedis pulses are 2+ on the right side and 2+ on the left side.      Heart sounds: Normal heart sounds, S1 normal and S2 normal.      Comments: Temporary dialysis catheter right chest wall  Pulmonary:      Effort: Pulmonary effort is normal.      Breath sounds: Normal breath sounds.   Abdominal:      General: Bowel sounds are normal.      Palpations: Abdomen is soft.   Musculoskeletal:         General: Normal range of motion.   Skin:     General: Skin is warm and dry.      Capillary Refill: Capillary refill takes 2 to 3 seconds.      Comments: Candidal rash anterior abdominal skin folds   Neurological:      Mental Status: She is alert and oriented to person, place, and time.      GCS: GCS eye subscore is 4. GCS verbal subscore is 5. GCS motor subscore is 6.   Psychiatric:         Speech: Speech normal.         Behavior: Behavior normal.         Thought Content: Thought content normal.         Medication Review    Current Facility-Administered Medications:   •  acetaminophen (TYLENOL) tablet 650 mg, 650 mg, Oral, Q4H PRN, 650 mg at 01/18/22 0913 **OR** acetaminophen (TYLENOL) 160 MG/5ML solution 650 mg, 650 mg, Oral, Q4H PRN **OR** acetaminophen (TYLENOL) suppository 650 mg, 650 mg, Rectal, Q4H PRN, Paulina Solano MD  •   albuterol (PROVENTIL) nebulizer solution 0.083% 2.5 mg/3mL, 2.5 mg, Nebulization, Q4H PRN, Paulina Solano MD  •  aspirin EC tablet 81 mg, 81 mg, Oral, Daily, Paulina Solano MD, 81 mg at 01/18/22 0902  •  atorvastatin (LIPITOR) tablet 20 mg, 20 mg, Oral, Daily, Paulina Solano MD, 20 mg at 01/18/22 0902  •  sennosides-docusate (PERICOLACE) 8.6-50 MG per tablet 2 tablet, 2 tablet, Oral, BID, 2 tablet at 01/18/22 2050 **AND** polyethylene glycol (MIRALAX) packet 17 g, 17 g, Oral, Daily PRN **AND** bisacodyl (DULCOLAX) EC tablet 5 mg, 5 mg, Oral, Daily PRN **AND** bisacodyl (DULCOLAX) suppository 10 mg, 10 mg, Rectal, Daily PRN, Paulina Solano MD  •  bumetanide (BUMEX) tablet 2 mg, 2 mg, Oral, Once per day on Sun Tue Thu Sat, Ace Lauren MD, 2 mg at 01/18/22 0902  •  clopidogrel (PLAVIX) tablet 75 mg, 75 mg, Oral, Daily, Paulina Solano MD, 75 mg at 01/18/22 0904  •  dextrose (D50W) (25 g/50 mL) IV injection 12.5 g, 12.5 g, Intravenous, PRN, Paulina Solano MD  •  dextrose (D50W) (25 g/50 mL) IV injection 25 g, 25 g, Intravenous, Q15 Min PRN, Paulina Solano MD  •  dextrose (GLUTOSE) oral gel 15 g, 15 g, Oral, Q15 Min PRN, Paulina Solano MD  •  epoetin hubert-epbx (RETACRIT) injection 10,000 Units, 10,000 Units, Intravenous, Once per day on Mon Wed Fri, Ace Lauren MD, 10,000 Units at 01/17/22 1715  •  gabapentin (NEURONTIN) capsule 300 mg, 300 mg, Oral, Q8H, Paulina Solano MD, 300 mg at 01/19/22 0629  •  glucagon (human recombinant) (GLUCAGEN DIAGNOSTIC) injection 1 mg, 1 mg, Subcutaneous, Q15 Min PRN, Paulina Solano MD  •  heparin (porcine) 5000 UNIT/ML injection 5,000 Units, 5,000 Units, Subcutaneous, Q8H, Paulina Solano MD, 5,000 Units at 01/19/22 0629  •  heparin (porcine) injection 2,000 Units, 2,000 Units, Intracatheter, PRN, Ace Lauren MD, 2,000 Units at 01/17/22 1400  •  heparin (porcine) injection 4,000 Units, 4,000 Units, Intracatheter,  PRN, Timothy Valle MD, 4,000 Units at 01/17/22 1742  •  hydrALAZINE (APRESOLINE) injection 10 mg, 10 mg, Intravenous, Q6H PRN, Paulina Solano MD, 10 mg at 01/09/22 1628  •  HYDROcodone-acetaminophen (NORCO) 5-325 MG per tablet 1 tablet, 1 tablet, Oral, Q6H PRN, Ace Lauren MD, 1 tablet at 01/18/22 2051  •  hydrocortisone-bacitracin-zinc oxide-nystatin (MAGIC BARRIER) ointment 1 application, 1 application, Topical, PRN, Paulina Solano MD, 1 application at 01/18/22 2053  •  insulin aspart (novoLOG) injection 0-24 Units, 0-24 Units, Subcutaneous, TID AC, Paulina Solano MD, 12 Units at 01/18/22 1229  •  insulin aspart (novoLOG) injection 30 Units, 30 Units, Subcutaneous, TID With Meals, Paulina Solano MD, 30 Units at 01/18/22 1228  •  insulin detemir (LEVEMIR) injection 30 Units, 30 Units, Subcutaneous, Daily, Luz Quijano MD, 30 Units at 01/18/22 0918  •  insulin detemir (LEVEMIR) injection 35 Units, 35 Units, Subcutaneous, Nightly, Luz Quijano MD, 35 Units at 01/17/22 2032  •  ipratropium-albuterol (DUO-NEB) nebulizer solution 3 mL, 3 mL, Nebulization, 4x Daily - RT, Paulina Solano MD, 3 mL at 01/19/22 0717  •  isosorbide mononitrate (IMDUR) 24 hr tablet 30 mg, 30 mg, Oral, QAM, Paulina Solano MD, 30 mg at 01/19/22 0629  •  labetalol (NORMODYNE,TRANDATE) injection 20 mg, 20 mg, Intravenous, Q6H PRN, Paulina Solano MD, 20 mg at 01/09/22 1737  •  levothyroxine (SYNTHROID, LEVOTHROID) tablet 25 mcg, 25 mcg, Oral, Daily, Paulina Solano MD, 25 mcg at 01/18/22 1000  •  lidocaine (LIDODERM) 5 % 1 patch, 1 patch, Transdermal, Q24H, Luz Quijano MD, 1 patch at 01/18/22 0913  •  lisinopril (PRINIVIL,ZESTRIL) tablet 5 mg, 5 mg, Oral, Daily, Paulina Solano MD, 5 mg at 01/18/22 0912  •  metoprolol tartrate (LOPRESSOR) tablet 25 mg, 25 mg, Oral, Q12H, Ace Lauren MD, 25 mg at 01/18/22 2050  •  midodrine (PROAMATINE) tablet 5 mg, 5 mg, Oral, Once per day on Mon  Wed Fri, Ace Lauren MD, 5 mg at 01/17/22 1132  •  montelukast (SINGULAIR) tablet 10 mg, 10 mg, Oral, Nightly, Paulina Solano MD, 10 mg at 01/18/22 2050  •  muscle rub (BenGay) 10-15 % cream 1 application, 1 application, Topical, Q1H PRN, Luz Quijano MD, 1 application at 01/18/22 0906  •  nystatin (MYCOSTATIN) powder, , Topical, Q12H, Luz Quijano MD, Given at 01/18/22 2053  •  O2 (OXYGEN), 3 L/min, Inhalation, Continuous, Paulina Solano MD, Last Rate: 180,000 mL/hr at 01/09/22 1809, 3 L/min at 01/09/22 1809  •  ondansetron (ZOFRAN) injection 4 mg, 4 mg, Intravenous, Q6H PRN, Paulina Solano MD, 4 mg at 01/16/22 1341  •  oxybutynin XL (DITROPAN-XL) 24 hr tablet 5 mg, 5 mg, Oral, Nightly, Luz Quijano MD  •  pantoprazole (PROTONIX) EC tablet 40 mg, 40 mg, Oral, Q AM, Luz Quijano MD, 40 mg at 01/19/22 0629  •  ranolazine (RANEXA) 12 hr tablet 500 mg, 500 mg, Oral, Q12H, Paulina Solano MD, 500 mg at 01/18/22 2050  •  sodium chloride 0.9 % flush 10 mL, 10 mL, Intravenous, PRN, Paulina Solano MD  •  sodium chloride 0.9 % flush 10 mL, 10 mL, Intravenous, Once PRN, Paulina Solano MD  •  sodium chloride 0.9 % flush 10 mL, 10 mL, Intravenous, Q12H, Paulina Solano MD, 10 mL at 01/18/22 2051  •  sodium chloride 0.9 % flush 10 mL, 10 mL, Intravenous, PRN, Paulina Solano MD  •  sodium chloride 0.9 % infusion, 10 mL/hr, Intravenous, Continuous PRN, Paulina Solano MD     Diagnostic Data    Lab Results (last 24 hours)     Procedure Component Value Units Date/Time    POC Glucose Once [[id number]]  (Abnormal) Collected: 01/19/22 0606    Specimen: Blood Updated: 01/19/22 0621     Glucose 229 mg/dL      Comment: RN NotifiedOperator: [id number] TASHI KinrossMeter ID: [id number]       Basic Metabolic Panel [[id number]]  (Abnormal) Collected: 01/19/22 0537    Specimen: Blood Updated: 01/19/22 0612     Glucose 238 mg/dL      BUN 46 mg/dL      Creatinine 6.25 mg/dL       Sodium 129 mmol/L      Potassium 5.3 mmol/L      Chloride 92 mmol/L      CO2 23.0 mmol/L      Calcium 8.8 mg/dL      eGFR   Amer --     Comment: <15 Indicative of kidney failure.        eGFR Non African Amer 7 mL/min/1.73      Comment: <15 Indicative of kidney failure.        BUN/Creatinine Ratio 7.4     Anion Gap 14.0 mmol/L     Narrative:      GFR Normal >60  Chronic Kidney Disease <60  Kidney Failure <15      CBC (No Diff) [[id number]]  (Abnormal) Collected: 01/19/22 0537    Specimen: Blood Updated: 01/19/22 0553     WBC 6.31 10*3/mm3      RBC 2.74 10*6/mm3      Hemoglobin 7.6 g/dL      Hematocrit 23.4 %      MCV 85.4 fL      MCH 27.7 pg      MCHC 32.5 g/dL      RDW 14.4 %      RDW-SD 44.5 fl      MPV 8.6 fL      Platelets 311 10*3/mm3     POC Glucose Once [[id number]]  (Abnormal) Collected: 01/1960    Specimen: Blood Updated: 01/18/22 2027     Glucose 142 mg/dL      Comment: RN NotifiedOperator: [id number] TASHI JORDANMeter ID: [id number]       POC Glucose Once [[id number]]  (Normal) Collected: 01/18/22 1637    Specimen: Blood Updated: 01/18/22 1707     Glucose 73 mg/dL      Comment: RN NotifiedOperator: [id number] GUEVARA CRYSTALMeter ID: [id number]       POC Glucose Once [[id number]]  (Abnormal) Collected: 01/18/22 1044    Specimen: Blood Updated: 01/18/22 1101     Glucose 277 mg/dL      Comment: RN NotifiedOperator: [id number] GUEVARA CRYSTALMeter ID: [id number]       Blood Culture - Blood, Arm, Left [[id number]]  (Normal) Collected: 01/15/22 0719    Specimen: Blood from Arm, Left Updated: 01/18/22 0730     Blood Culture No growth at 3 days    Blood Culture - Blood, Arm, Right [[id number]]  (Normal) Collected: 01/15/22 0725    Specimen: Blood from Arm, Right Updated: 01/18/22 0730     Blood Culture No growth at 3 days           Imaging Results (Last 24 Hours)     ** No results found for the last 24 hours. **          I reviewed the patient's new clinical results.    Assessment/Plan:     Active  Hospital Problems    Diagnosis    • **Physical deconditioning      - PT/OT   - Home health with PT     • Type 2 diabetes mellitus with kidney complication, with long-term current use of insulin ([facility])      - Levemir 30units AM. 35 units PM  - Novolog 30 with meals  - SSI  -HgA1c 10/2021 : 8.80  -CGM on dischrge       • Personal history of noncompliance with medical treatment, presenting hazards to health      · Difficulty showing up to clinic visits due to complexity of medical problems and transportation  · Would really benefit from a couple home visits from PCP to help improve compliance       • COPD (chronic obstructive pulmonary disease) ([facility])      - Dependent on 3L NC at Home  - Maintain strict sats between 88-92%  -Singulair 10mg   -DuoNebs  -NIPPV via Respiratory. Patient uses Trilogy at home/night      • Anemia due to chronic kidney disease, on chronic dialysis ([facility])      - MWF Dialysis patient - Nephrology following   - Epoetin (Retacrit) 10,000 units three times a week on dialysis days MWF  - Hg 7 on admission. Transfuse if Hg less than 7   - 1/14: Hg 8   - 1/15: Hg 7.7   - Daily CBC   - Type and screen done   -1/15 : Tunnel Catheter possibly need to be replaced after next HD session on Monday due to poor catheter flow vs loss of function. Will coordinate with nephrology if catheter can be replaced sooner as patient is awaiting SNF placement.            • Unspecified diastolic (congestive) heart failure ([facility])      - Continue Imdur, metoprolol, lisinopril, bumex  - pro-BNP 75890 on admission  - Echo 1/10/2022 : EF 61-65%  -Cardiology on board (Dr. Handy) - appreciate recommendations  - MWF Dialysis patient     • MKIE and COPD overlap syndrome ([facility])      · Home Trilogy/CPAP  · Home 3L oxygen dependent. Maintain strict Sats between 88-92%      • Hypothyroidism      Continue home Levothyroxine 25mcg, stable     • Fall      - witnessed fall without head trauma on 1/13  - No LOC; Patient states her legs gave out  -  1/9: CT head for AMS which has since resolved.    - 1/13 : Repeat CT of head - No acute intracranial pathology. Unchanged since admission CT    - 1/14: Patient AOx4 on exam, Lidocaine patch/ bengay for buttock discomfort   - 1/15 : patient AO x4   -1/13: Xray pelvis   -No gross displaced fracture evident   - 1/15: Bengay for buttock pain has helped with pain      - MRI unable to be done due to body habitus          • Coronary artery disease      · S/P CABG x 3 (Primary), Bilateral carotid artery stenosis w/ 2 stents on left side,  PAD (peripheral artery disease) with stent in right upper leg  · Continue   - aspirin 81mg   - Plavix 75mg daily  - atorvastatin 20 mg daily  - lisinopril 5mg daily  -lopressor 25mg BID  -Imdur 30mg daily  - Ranexa - 500mg BID   · EKG - sinus tachycardia             • Hypertension        · Metoprolol 25mg bid, hold if HR < 60bpm  · Lisinopril 5mg daily  · Telemetry  · Hydralazine 10mg prn q6h for SBP > 170  · Labetalol 20mg Q6h >160             DVT prophylaxis: Heparin  Code Status and Medical Interventions:   Ordered at: 01/09/22 1508     Level Of Support Discussed With:    Next of Kin (If No Surrogate)     Code Status (Patient has no pulse and is not breathing):    CPR (Attempt to Resuscitate)     Medical Interventions (Patient has pulse or is breathing):    Full Support     Comments:    Discussed with  Huy Slater at bedside       Plan for disposition:Where: home health and When:  today After dialysis      Time: 25 minutes     This document has been electronically signed by Paulina Solano MD on January 19, 2022 07:28 CST    Paulina Solano MD PGY-3  Part of this note may be an electronic transcription/translation of spoken language to printed text using the Dragon Dictation System.       Electronically signed by Paulina Solano MD at 01/19/22 2397     Ace Lauren MD at 01/18/22 1204          [facility]  [address] [place] [address]. [address]  T  "- [phone]  F - [phone]     Progress Note          PATIENT  DEMOGRAPHICS   PATIENT NAME: Yamileth Slater                      PHYSICIAN: Ace Lauren MD  : 1960  MRN: [id number]   LOS: 8 days    Patient Care Team:  Rianna Macias MD as PCP - General (Family Medicine)  Subjective   SUBJECTIVE   No soa, comfortable.        Objective   OBJECTIVE   Vital Signs  Temp:  [96.1 °F (35.6 °C)-97.8 °F (36.6 °C)] 96.1 °F (35.6 °C)  Heart Rate:  [58-74] 61  Resp:  [18-22] 20  BP: (100-153)/(60-74) 131/60    Flowsheet Rows      First Filed Value   Admission Height 167.6 cm (66\") Documented at 2022 1304   Admission Weight 132 kg (291 lb 9.6 oz) Documented at 2022 1048           I/O last 3 completed shifts:  In: 600 [P.O.:600]  Out: 4100 [Urine:1100; Other:3000]    PHYSICAL EXAM    Physical Exam  Constitutional:       Appearance: She is well-developed.   HENT:      Head: Normocephalic.   Eyes:      Pupils: Pupils are equal, round, and reactive to light.   Cardiovascular:      Rate and Rhythm: Normal rate and regular rhythm.      Heart sounds: Normal heart sounds.   Pulmonary:      Effort: Pulmonary effort is normal.      Breath sounds: Normal breath sounds.   Abdominal:      General: Bowel sounds are normal.      Palpations: Abdomen is soft.   Musculoskeletal:         General: No swelling.   Skin:     Coloration: Skin is not jaundiced.   Neurological:      Mental Status: She is alert and oriented to person, place, and time.         RESULTS   Results Review:    Results from last 7 days   Lab Units 22  0623 22  0606 22  0701   SODIUM mmol/L 127* 125* 127*   POTASSIUM mmol/L 5.1 4.8 4.6   CHLORIDE mmol/L 90* 89* 91*   CO2 mmol/L 22.0 21.0* 22.0   BUN mg/dL 40* 53* 49*   CREATININE mg/dL 5.88* 7.96* 7.77*   CALCIUM mg/dL 8.8 8.3* 8.5*   GLUCOSE mg/dL 221* 233* 284*       Estimated Creatinine Clearance: 13.7 mL/min (A) (by C-G formula based on SCr of 5.88 mg/dL (H)).    Results from " last 7 days   Lab Units 01/13/22  0621 01/12/22  1950 01/12/22  1634   PHOSPHORUS mg/dL 6.0* 4.1 4.0             Results from last 7 days   Lab Units 01/18/22  0623 01/17/22  0606 01/16/22  0701 01/15/22  0719 01/14/22  0621   WBC 10*3/mm3 6.73 6.84 6.84 8.68 7.66   HEMOGLOBIN g/dL 8.1* 7.5* 7.6* 7.7* 8.0*   PLATELETS 10*3/mm3 351 271 266 225 204               Imaging Results (Last 24 Hours)     ** No results found for the last 24 hours. **           MEDICATIONS    aspirin, 81 mg, Oral, Daily  atorvastatin, 20 mg, Oral, Daily  bumetanide, 2 mg, Oral, Once per day on Sun Tue Thu Sat  clopidogrel, 75 mg, Oral, Daily  epoetin hubert/hubert-epbx, 10,000 Units, Intravenous, Once per day on Mon Wed Fri  gabapentin, 300 mg, Oral, Q8H  heparin (porcine), 5,000 Units, Subcutaneous, Q8H  insulin aspart, 0-24 Units, Subcutaneous, TID AC  insulin aspart, 30 Units, Subcutaneous, TID With Meals  insulin detemir, 30 Units, Subcutaneous, Daily  insulin detemir, 35 Units, Subcutaneous, Nightly  ipratropium-albuterol, 3 mL, Nebulization, 4x Daily - RT  isosorbide mononitrate, 30 mg, Oral, QAM  levothyroxine, 25 mcg, Oral, Daily  lidocaine, 1 patch, Transdermal, Q24H  lisinopril, 5 mg, Oral, Daily  metoprolol tartrate, 25 mg, Oral, Q12H  midodrine, 5 mg, Oral, Once per day on Mon Wed Fri  montelukast, 10 mg, Oral, Nightly  nystatin, , Topical, Q12H  [START ON 1/19/2022] oxybutynin XL, 5 mg, Oral, Nightly  pantoprazole, 40 mg, Oral, Q AM  ranolazine, 500 mg, Oral, Q12H  senna-docusate sodium, 2 tablet, Oral, BID  sodium chloride, 10 mL, Intravenous, Q12H      O2, 3 L/min, Last Rate: 3 L/min (01/09/22 6622)  sodium chloride, 10 mL/hr        Assessment/Plan   ASSESSMENT / PLAN      Fall    Hypertension    COPD (chronic obstructive pulmonary disease) (HCC)    Coronary artery disease    Hypothyroidism    MIKE and COPD overlap syndrome (HCC)    Unspecified diastolic (congestive) heart failure (HCC)    Anemia due to chronic kidney disease, on  chronic dialysis (HCC)    Personal history of noncompliance with medical treatment, presenting hazards to health    Type 2 diabetes mellitus with kidney complication, with long-term current use of insulin (HCC)    Physical deconditioning    1.  ESRD on HD- Initiated HD on 10/21 due to hyperkalemia and fluid overload, now  ESRD.  HD MWF for now.  Keep bumex 2mg on non dialysis days. Euvolemic at present. Cr much higher this admission pre dialysis than it used to be ?due to poor catheter flow vs loss of function.    -keep midodrine pre dialysis. Catheter worked well after alteplase and able to get 300 - 350 blood flow. Next hd tomorrow. Plan for snf noted.      2.  Hyponatremia- in the setting of severe hyperglycemia, hyperosmolar hyponatremia. Now better with glucose correction     3.  HHS-started on insulin drip, managed per primary team. Off insulin drip. On aspart and levemir     4.  History of CAD     5.  History of COPD      6.  Anemia / previous GI bleed / b12 deficiency-  s/p capsule endoscopy which showed few small non-bleeding intestinal AVMs and single small polyp. Hgb slowly dropping.     7.  HTN- Continue home antihypertensives. Metoprolol lowered to 25mg           This document has been electronically signed by Ace Lauren MD on January 18, 2022 12:01 CST             Electronically signed by Ace Lauren MD at 01/18/22 1614     Luz Quijano MD at 01/18/22 0806     Attestation signed by Radha Argueta MD at 01/18/22 1158      HD#[id number] y.o. female with a CMH of ESRD on dialysis MWF (since 10/21/2021), CAD s/p CABG x3,  uncontrolled DM type 2, HFpEF, GERD, HTN, HLD, MIKE on CPAP who presents to the ED with AMS and noted HHS.     I was present with the resident during the entire history and physical exam. I discussed the case with the resident.  I have reviewed the notes, assessment and plan, and/or procedures performed by the resident. I concur with the resident’s documentation         Interval  events: Evening Levemir increased overnight.  This am morning BG in 220s (slightly lower then previous 24hrs). BG during the day well controlled at 100-150s.     Temp:  [97 °F (36.1 °C)-97.8 °F (36.6 °C)] 97 °F (36.1 °C)  Heart Rate:  [58-88] 59  Resp:  [16-22] 20  BP: (100-153)/(58-74) 153/71     GEN:  Alert X Oriented X4  HEENT:  NCAT  HEART:  S1, S2  LUNGS:  CTA bilaterally  ABD:  + BS, soft, NT, ND  EXT: moving all extremities - less pain to Rt buttocks.     A/P: 61 y.o. female admitted with:        Active Hospital Problems     Diagnosis   POA   • **Fall [W19.XXXA]   No   • Physical deconditioning [R53.81]   Yes   • Type 2 diabetes mellitus with kidney complication, with long-term current use of insulin ([facility]) [E11.29, Z79.4]   Not Applicable   • Personal history of noncompliance with medical treatment, presenting hazards to health [Z91.19]   Not Applicable   • Anemia due to chronic kidney disease, on chronic dialysis ([facility]) [N18.6, D63.1, Z99.2]   Not Applicable   • Unspecified diastolic (congestive) heart failure ([facility]) [I50.30]   Yes   • MIKE and COPD overlap syndrome ([facility]) [G47.33, J44.9]   Yes   • Hypothyroidism [E03.9]   Yes   • Coronary artery disease [I25.10]   Yes   • COPD (chronic obstructive pulmonary disease) ([facility]) [J44.9]   Yes   • Hypertension [I10]   Yes       Resolved Hospital Problems     Diagnosis Date Resolved POA   • Diabetic acidosis ([facility]) [E11.10] 01/10/2022 Yes   • SIRS (systemic inflammatory response syndrome) ([facility]) [R65.10] 01/11/2022 Yes   • Altered mental status [R41.82] 01/11/2022 Yes   • CAD (coronary artery disease) [I25.10] 01/09/2022 Yes   • Acute cystitis with hematuria [N30.01] 01/15/2022 No   • Type 2 diabetes mellitus with hyperosmolar hyperglycemic state (HHS) ([facility]) [E11.00, E11.65] 01/11/2022 Yes      PLAN:  Continue current management plan.  Awaiting SNF.  Stable asymptomatic hyponatremia.  Signature     This document has been electronically signed by Radha Argueta MD on  2022 07:04 [facility]                                        FAMILY MEDICINE RESIDENCY SERVICE  DAILY PROGRESS NOTE    NAME: Yamileth Slater  : 1960  MRN: [id number]      LOS: 8 days     PROVIDER OF SERVICE: Luz Quijano MD    Chief Complaint: Fall    Subjective:     Interval History:  History taken from: patient chart    Patient was seen awake and resting this AM. She received HD yesterday and catheter worked appropriately. Replacement is not necessary at this time. Increased Levemir PM dose to 35 units for tighter control of glucose readings. [place] [facility] [place] due to insurance change she understands she will not be going [place] [facility] and Is agreeable to a different SNF. Denies soa, chest pain, abdominal pain, n/v, dizziness.        Review of Systems:   Review of Systems   Constitutional: Negative.    Respiratory: Negative.    Cardiovascular: Negative.    Gastrointestinal: Negative.    Endocrine: Negative.    Musculoskeletal: Negative for arthralgias and myalgias.   Skin: Negative.    Neurological: Negative.  Negative for headaches.   Psychiatric/Behavioral: Negative.  Negative for suicidal ideas.       Objective:     Vital Signs  Temp:  [96.1 °F (35.6 °C)-97.8 °F (36.6 °C)] 96.1 °F (35.6 °C)  Heart Rate:  [58-88] 62  Resp:  [16-22] 18  BP: (100-153)/(60-74) 131/60  Flow (L/min):  [3] 3   Body mass index is 46.12 kg/m².    Physical Exam  Physical Exam  Constitutional:       Appearance: Normal appearance. She is obese.   HENT:      Head: Normocephalic and atraumatic.      Nose: Nose normal.      Mouth/Throat:      Mouth: Mucous membranes are moist.   Cardiovascular:      Rate and Rhythm: Normal rate and regular rhythm.      Pulses: Normal pulses.      Heart sounds: Normal heart sounds.   Pulmonary:      Effort: Pulmonary effort is normal.      Breath sounds: Normal breath sounds.   Abdominal:      General: Abdomen is flat. Bowel sounds are normal.      Palpations: Abdomen is soft.    Musculoskeletal:         General: Normal range of motion.      Cervical back: Normal range of motion.   Skin:     General: Skin is warm and dry.      Capillary Refill: Capillary refill takes less than 2 seconds.   Neurological:      General: No focal deficit present.      Mental Status: She is alert and oriented to person, place, and time.   Psychiatric:         Mood and Affect: Mood normal.         Behavior: Behavior normal.         Scheduled Meds   aspirin, 81 mg, Oral, Daily  atorvastatin, 20 mg, Oral, Daily  bumetanide, 2 mg, Oral, Once per day on Sun Tue Thu Sat  clopidogrel, 75 mg, Oral, Daily  epoetin hubert/hubert-epbx, 10,000 Units, Intravenous, Once per day on Mon Wed Fri  gabapentin, 300 mg, Oral, Q8H  heparin (porcine), 5,000 Units, Subcutaneous, Q8H  insulin aspart, 0-24 Units, Subcutaneous, TID AC  insulin aspart, 30 Units, Subcutaneous, TID With Meals  insulin detemir, 30 Units, Subcutaneous, Daily  insulin detemir, 35 Units, Subcutaneous, Nightly  ipratropium-albuterol, 3 mL, Nebulization, 4x Daily - RT  isosorbide mononitrate, 30 mg, Oral, QAM  levothyroxine, 25 mcg, Oral, Daily  lidocaine, 1 patch, Transdermal, Q24H  lisinopril, 5 mg, Oral, Daily  metoprolol tartrate, 25 mg, Oral, Q12H  midodrine, 5 mg, Oral, Once per day on Mon Wed Fri  montelukast, 10 mg, Oral, Nightly  nystatin, , Topical, Q12H  oxybutynin XL, 5 mg, Oral, Daily  pantoprazole, 40 mg, Oral, Q AM  ranolazine, 500 mg, Oral, Q12H  senna-docusate sodium, 2 tablet, Oral, BID  sodium chloride, 10 mL, Intravenous, Q12H       PRN Meds   •  acetaminophen **OR** acetaminophen **OR** acetaminophen  •  albumin human  •  albuterol  •  senna-docusate sodium **AND** polyethylene glycol **AND** bisacodyl **AND** bisacodyl  •  dextrose  •  dextrose  •  dextrose  •  glucagon (human recombinant)  •  heparin (porcine)  •  heparin (porcine)  •  hydrALAZINE  •  hydrocortisone-bacitracin-zinc oxide-nystatin  •  labetalol  •  muscle rub  •  ondansetron  •   sodium chloride  •  sodium chloride  •  sodium chloride  •  sodium chloride      Diagnostic Data    Results from last 7 days   Lab Units 01/18/22  0623   WBC 10*3/mm3 6.73   HEMOGLOBIN g/dL 8.1*   HEMATOCRIT % 25.2*   PLATELETS 10*3/mm3 351   GLUCOSE mg/dL 221*   CREATININE mg/dL 5.88*   BUN mg/dL 40*   SODIUM mmol/L 127*   POTASSIUM mmol/L 5.1   ANION GAP mmol/L 15.0       No radiology results for the last day      I reviewed the patient's new clinical results.    Assessment/Plan:     Active Hospital Problems    Diagnosis  POA   • **Fall [W19.XXXA]  No     Priority: High     - witnessed fall without head trauma on 1/13  - No LOC; Patient states her legs gave out  - 1/9: CT head for AMS which has since resolved.    - 1/13 : Repeat CT of head - No acute intracranial pathology. Unchanged since admission CT    - 1/14: Patient AOx4 on exam, Lidocaine patch/ bengay for buttock discomfort   - 1/15 : patient AO x4   -1/13: Xray pelvis   -No gross displaced fracture evident   - 1/15: Bengay for buttock pain has helped with pain      - MRI unable to be done due to body habitus          • Physical deconditioning [R53.81]  Yes     Priority: High     - PT/OT   - SNF placement pending      • Type 2 diabetes mellitus with kidney complication, with long-term current use of insulin (HCC) [E11.29, Z79.4]  Not Applicable     Priority: High     - Levemir 30units AM. 35 units PM  - Novolog 30 with meals  - SSI  -HgA1c 10/2021 : 8.80       • Personal history of noncompliance with medical treatment, presenting hazards to health [Z91.19]  Not Applicable     Priority: Medium     · Difficulty showing up to clinic visits due to complexity of medical problems and transportation  · Would really benefit from a couple home visits from PCP to help improve compliance       • Anemia due to chronic kidney disease, on chronic dialysis (HCC) [N18.6, D63.1, Z99.2]  Not Applicable     Priority: Medium     - MWF Dialysis patient - Nephrology following    - Epoetin (Retacrit) 10,000 units three times a week on dialysis days MWF  - Hg 7 on admission. Transfuse if Hg less than 7   - 1/14: Hg 8   - 1/15: Hg 7.7   - Daily CBC   - Type and screen done   -1/15 : Tunnel Catheter possibly need to be replaced after next HD session on Monday due to poor catheter flow vs loss of function. Will coordinate with nephrology if catheter can be replaced sooner as patient is awaiting SNF placement.            • Unspecified diastolic (congestive) heart failure (HCC) [I50.30]  Yes     Priority: Low     - Continue Imdur, metoprolol, lisinopril, bumex  - pro-BNP 20791 on admission  - Echo 1/10/2022 : EF 61-65%  -Cardiology on board (Dr. Handy) - appreciate recommendations  - MWF Dialysis patient     • MIKE and COPD overlap syndrome ([facility]) [G47.33, J44.9]  Yes     Priority: Low     · Home Trilogy/CPAP  · Home 3L oxygen dependent. Maintain strict Sats between 88-92%      • Hypothyroidism [E03.9]  Yes     Priority: Low     Continue home Levothyroxine 25mcg, stable     • Coronary artery disease [I25.10]  Yes     Priority: Low     · S/P CABG x 3 (Primary), Bilateral carotid artery stenosis w/ 2 stents on left side,  PAD (peripheral artery disease) with stent in right upper leg  · Continue   - aspirin 81mg   - Plavix 75mg daily  - atorvastatin 20 mg daily  - lisinopril 5mg daily  -lopressor 25mg BID  -Imdur 30mg daily  - Ranexa - 500mg BID   · EKG - sinus tachycardia             • COPD (chronic obstructive pulmonary disease) ([facility]) [J44.9]  Yes     Priority: Low     - Dependent on 3L NC at Home  - Maintain strict sats between 88-92%  -Singulair 10mg   -DuoNebs  -NIPPV via Respiratory. Patient uses Trilogy at home/night      • Hypertension [I10]  Yes     Priority: Low       · Metoprolol 25mg bid, hold if HR < 60bpm  · Lisinopril 5mg daily  · Telemetry  · Hydralazine 10mg prn q6h for SBP > 170  · Labetalol 20mg Q6h >160           DVT prophylaxis: Heparin  Code status is   Code Status and Medical  "Interventions:   Ordered at: 22 1508     Level Of Support Discussed With:    Next of Kin (If No Surrogate)     Code Status (Patient has no pulse and is not breathing):    CPR (Attempt to Resuscitate)     Medical Interventions (Patient has pulse or is breathing):    Full Support     Comments:    Discussed with  Huy Slater at bedside       Plan for disposition:Where: SNF and When: today - pending placement       Time: 20 mins         This document has been electronically signed by Luz Quijano MD on 2022 08:13 CST         Electronically signed by Radha Argueta MD at 22 1158     Ace Lauren MD at 22 1204          [facility]  [address] [place] [address]. [address]  T - [phone]  F - [phone]     Progress Note          PATIENT  DEMOGRAPHICS   PATIENT NAME: Yamileth Slater                      PHYSICIAN: Ace Lauren MD  : 1960  MRN: [id number]   LOS: 7 days    Patient Care Team:  Rianna Macias MD as PCP - General (Family Medicine)  Subjective   SUBJECTIVE   No soa, awaiting hd        Objective   OBJECTIVE   Vital Signs  Temp:  [97.6 °F (36.4 °C)-98 °F (36.7 °C)] 97.6 °F (36.4 °C)  Heart Rate:  [58-88] 62  Resp:  [16-18] 16  BP: (119-158)/(58-70) 119/58    Flowsheet Rows      First Filed Value   Admission Height 167.6 cm (66\") Documented at 2022 1304   Admission Weight 132 kg (291 lb 9.6 oz) Documented at 2022 1048           I/O last 3 completed shifts:  In: 600 [P.O.:600]  Out: -     PHYSICAL EXAM    Physical Exam  Constitutional:       Appearance: She is well-developed.   HENT:      Head: Normocephalic.   Eyes:      Pupils: Pupils are equal, round, and reactive to light.   Cardiovascular:      Rate and Rhythm: Normal rate and regular rhythm.      Heart sounds: Normal heart sounds.   Pulmonary:      Effort: Pulmonary effort is normal.      Breath sounds: Normal breath sounds.   Abdominal:      General: Bowel " sounds are normal.      Palpations: Abdomen is soft.   Musculoskeletal:         General: No swelling.   Skin:     Coloration: Skin is not jaundiced.   Neurological:      Mental Status: She is alert and oriented to person, place, and time.         RESULTS   Results Review:    Results from last 7 days   Lab Units 01/17/22  0606 01/16/22  0701 01/15/22  0719   SODIUM mmol/L 125* 127* 130*   POTASSIUM mmol/L 4.8 4.6 4.7   CHLORIDE mmol/L 89* 91* 93*   CO2 mmol/L 21.0* 22.0 23.0   BUN mg/dL 53* 49* 41*   CREATININE mg/dL 7.96* 7.77* 7.23*   CALCIUM mg/dL 8.3* 8.5* 8.0*   GLUCOSE mg/dL 233* 284* 179*       Estimated Creatinine Clearance: 10.2 mL/min (A) (by C-G formula based on SCr of 7.96 mg/dL (H)).    Results from last 7 days   Lab Units 01/13/22  0621 01/12/22  1950 01/12/22  1634   PHOSPHORUS mg/dL 6.0* 4.1 4.0             Results from last 7 days   Lab Units 01/17/22  0606 01/16/22  0701 01/15/22  0719 01/14/22  0621 01/13/22  0621   WBC 10*3/mm3 6.84 6.84 8.68 7.66 9.06   HEMOGLOBIN g/dL 7.5* 7.6* 7.7* 8.0* 8.5*   PLATELETS 10*3/mm3 271 266 225 204 220               Imaging Results (Last 24 Hours)     ** No results found for the last 24 hours. **           MEDICATIONS    aspirin, 81 mg, Oral, Daily  atorvastatin, 20 mg, Oral, Daily  bumetanide, 2 mg, Oral, Once per day on Sun Tue Thu Sat  clopidogrel, 75 mg, Oral, Daily  gabapentin, 300 mg, Oral, Q8H  heparin (porcine), 5,000 Units, Subcutaneous, Q8H  insulin aspart, 0-24 Units, Subcutaneous, TID AC  insulin aspart, 30 Units, Subcutaneous, TID With Meals  [START ON 1/18/2022] insulin detemir, 30 Units, Subcutaneous, Daily  insulin detemir, 35 Units, Subcutaneous, Nightly  ipratropium-albuterol, 3 mL, Nebulization, 4x Daily - RT  isosorbide mononitrate, 30 mg, Oral, QAM  levothyroxine, 25 mcg, Oral, Daily  lidocaine, 1 patch, Transdermal, Q24H  lisinopril, 5 mg, Oral, Daily  metoprolol tartrate, 25 mg, Oral, Q12H  midodrine, 5 mg, Oral, Once per day on Mon Wed  Fri  montelukast, 10 mg, Oral, Nightly  nystatin, , Topical, Q12H  oxybutynin XL, 5 mg, Oral, Daily  pantoprazole, 40 mg, Oral, Q AM  ranolazine, 500 mg, Oral, Q12H  senna-docusate sodium, 2 tablet, Oral, BID  sodium chloride, 10 mL, Intravenous, Q12H      O2, 3 L/min, Last Rate: 3 L/min (01/09/22 1809)  sodium chloride, 10 mL/hr        Assessment/Plan   ASSESSMENT / PLAN      Fall    Hypertension    COPD (chronic obstructive pulmonary disease) (HCC)    Coronary artery disease    Hypothyroidism    MIKE and COPD overlap syndrome ([facility])    Unspecified diastolic (congestive) heart failure (HCC)    Anemia due to chronic kidney disease, on chronic dialysis ([facility])    Personal history of noncompliance with medical treatment, presenting hazards to health    Type 2 diabetes mellitus with kidney complication, with long-term current use of insulin ([facility])    Physical deconditioning    1.  ESRD on HD- Initiated HD on 10/21 due to hyperkalemia and fluid overload, now  ESRD.  HD MWF for now.  Keep bumex 2mg on non dialysis days. Euvolemic at present. Cr much higher this admission pre dialysis than it used to be ?due to poor catheter flow vs loss of function.    -keep midodrine pre dialysis.  If catheter not working today then needs line exchange, will evaluate after HD today     2.  Hyponatremia- in the setting of severe hyperglycemia, hyperosmolar hyponatremia. Now better with glucose correction     3.  HHS-started on insulin drip, managed per primary team. Off insulin drip. On aspart and levemir     4.  History of CAD     5.  History of COPD      6.  Anemia / previous GI bleed / b12 deficiency-  s/p capsule endoscopy which showed few small non-bleeding intestinal AVMs and single small polyp. Hgb slowly dropping.     7.  HTN- Continue home antihypertensives. Metoprolol lowered to 25mg    dispo awaiting snf approval           This document has been electronically signed by Ace Lauren MD on January 17, 2022 12:05 CST              Electronically signed by Ace Lauren MD at 01/17/22 5938       Medical Student Notes (last 24 hours)  Notes from 01/18/22 0912 through 01/19/22 0912   No notes of this type exist for this encounter.         Consult Notes (last 24 hours)  Notes from 01/18/22 0912 through 01/19/22 0912   No notes of this type exist for this encounter.

## 2022-01-20 ENCOUNTER — READMISSION MANAGEMENT (OUTPATIENT)
Dept: CALL CENTER | Facility: HOSPITAL | Age: 62
End: 2022-01-20

## 2022-01-20 VITALS
SYSTOLIC BLOOD PRESSURE: 174 MMHG | TEMPERATURE: 97.3 F | HEART RATE: 70 BPM | HEIGHT: 66 IN | DIASTOLIC BLOOD PRESSURE: 62 MMHG | BODY MASS INDEX: 45.87 KG/M2 | WEIGHT: 285.4 LBS | RESPIRATION RATE: 20 BRPM | OXYGEN SATURATION: 100 %

## 2022-01-20 LAB
ANION GAP SERPL CALCULATED.3IONS-SCNC: 8 MMOL/L (ref 5–15)
BACTERIA SPEC AEROBE CULT: NORMAL
BACTERIA SPEC AEROBE CULT: NORMAL
BUN SERPL-MCNC: 31 MG/DL (ref 8–23)
BUN/CREAT SERPL: 6.7 (ref 7–25)
CALCIUM SPEC-SCNC: 8.9 MG/DL (ref 8.6–10.5)
CHLORIDE SERPL-SCNC: 93 MMOL/L (ref 98–107)
CO2 SERPL-SCNC: 26 MMOL/L (ref 22–29)
CREAT SERPL-MCNC: 4.65 MG/DL (ref 0.57–1)
DEPRECATED RDW RBC AUTO: 45.1 FL (ref 37–54)
ERYTHROCYTE [DISTWIDTH] IN BLOOD BY AUTOMATED COUNT: 14.3 % (ref 12.3–15.4)
GFR SERPL CREATININE-BSD FRML MDRD: 10 ML/MIN/1.73
GFR SERPL CREATININE-BSD FRML MDRD: ABNORMAL ML/MIN/{1.73_M2}
GLUCOSE BLDC GLUCOMTR-MCNC: 242 MG/DL (ref 70–130)
GLUCOSE SERPL-MCNC: 252 MG/DL (ref 65–99)
HCT VFR BLD AUTO: 24.5 % (ref 34–46.6)
HGB BLD-MCNC: 7.8 G/DL (ref 12–15.9)
MCH RBC QN AUTO: 27.8 PG (ref 26.6–33)
MCHC RBC AUTO-ENTMCNC: 31.8 G/DL (ref 31.5–35.7)
MCV RBC AUTO: 87.2 FL (ref 79–97)
PLATELET # BLD AUTO: 332 10*3/MM3 (ref 140–450)
PMV BLD AUTO: 8.5 FL (ref 6–12)
POTASSIUM SERPL-SCNC: 5.5 MMOL/L (ref 3.5–5.2)
RBC # BLD AUTO: 2.81 10*6/MM3 (ref 3.77–5.28)
SODIUM SERPL-SCNC: 127 MMOL/L (ref 136–145)
WBC NRBC COR # BLD: 7.68 10*3/MM3 (ref 3.4–10.8)

## 2022-01-20 PROCEDURE — 99239 HOSP IP/OBS DSCHRG MGMT >30: CPT | Performed by: STUDENT IN AN ORGANIZED HEALTH CARE EDUCATION/TRAINING PROGRAM

## 2022-01-20 PROCEDURE — G0180 MD CERTIFICATION HHA PATIENT: HCPCS | Performed by: FAMILY MEDICINE

## 2022-01-20 PROCEDURE — 80048 BASIC METABOLIC PNL TOTAL CA: CPT | Performed by: STUDENT IN AN ORGANIZED HEALTH CARE EDUCATION/TRAINING PROGRAM

## 2022-01-20 PROCEDURE — 97530 THERAPEUTIC ACTIVITIES: CPT

## 2022-01-20 PROCEDURE — 25010000002 HEPARIN (PORCINE) PER 1000 UNITS: Performed by: STUDENT IN AN ORGANIZED HEALTH CARE EDUCATION/TRAINING PROGRAM

## 2022-01-20 PROCEDURE — 63710000001 INSULIN ASPART PER 5 UNITS: Performed by: STUDENT IN AN ORGANIZED HEALTH CARE EDUCATION/TRAINING PROGRAM

## 2022-01-20 PROCEDURE — 82962 GLUCOSE BLOOD TEST: CPT

## 2022-01-20 PROCEDURE — 97535 SELF CARE MNGMENT TRAINING: CPT

## 2022-01-20 PROCEDURE — 94799 UNLISTED PULMONARY SVC/PX: CPT

## 2022-01-20 PROCEDURE — 85027 COMPLETE CBC AUTOMATED: CPT | Performed by: STUDENT IN AN ORGANIZED HEALTH CARE EDUCATION/TRAINING PROGRAM

## 2022-01-20 PROCEDURE — 63710000001 INSULIN DETEMIR PER 5 UNITS: Performed by: STUDENT IN AN ORGANIZED HEALTH CARE EDUCATION/TRAINING PROGRAM

## 2022-01-20 RX ADMIN — PANTOPRAZOLE SODIUM 40 MG: 40 TABLET, DELAYED RELEASE ORAL at 06:24

## 2022-01-20 RX ADMIN — IPRATROPIUM BROMIDE AND ALBUTEROL SULFATE 3 ML: 2.5; .5 SOLUTION RESPIRATORY (INHALATION) at 06:34

## 2022-01-20 RX ADMIN — METOPROLOL TARTRATE 25 MG: 25 TABLET, FILM COATED ORAL at 08:38

## 2022-01-20 RX ADMIN — INSULIN ASPART 30 UNITS: 100 INJECTION, SOLUTION INTRAVENOUS; SUBCUTANEOUS at 12:06

## 2022-01-20 RX ADMIN — INSULIN ASPART 8 UNITS: 100 INJECTION, SOLUTION INTRAVENOUS; SUBCUTANEOUS at 08:38

## 2022-01-20 RX ADMIN — ATORVASTATIN CALCIUM 20 MG: 20 TABLET, FILM COATED ORAL at 08:37

## 2022-01-20 RX ADMIN — INSULIN DETEMIR 30 UNITS: 100 INJECTION, SOLUTION SUBCUTANEOUS at 08:41

## 2022-01-20 RX ADMIN — RANOLAZINE 500 MG: 500 TABLET, FILM COATED, EXTENDED RELEASE ORAL at 08:37

## 2022-01-20 RX ADMIN — LEVOTHYROXINE SODIUM 25 MCG: 25 TABLET ORAL at 08:37

## 2022-01-20 RX ADMIN — INSULIN ASPART 8 UNITS: 100 INJECTION, SOLUTION INTRAVENOUS; SUBCUTANEOUS at 12:06

## 2022-01-20 RX ADMIN — LISINOPRIL 5 MG: 5 TABLET ORAL at 08:37

## 2022-01-20 RX ADMIN — BUMETANIDE 2 MG: 1 TABLET ORAL at 08:37

## 2022-01-20 RX ADMIN — GABAPENTIN 300 MG: 300 CAPSULE ORAL at 06:23

## 2022-01-20 RX ADMIN — LIDOCAINE 1 PATCH: 50 PATCH CUTANEOUS at 08:38

## 2022-01-20 RX ADMIN — CLOPIDOGREL 75 MG: 75 TABLET, FILM COATED ORAL at 08:38

## 2022-01-20 RX ADMIN — SODIUM CHLORIDE, PRESERVATIVE FREE 10 ML: 5 INJECTION INTRAVENOUS at 08:46

## 2022-01-20 RX ADMIN — HEPARIN SODIUM 5000 UNITS: 5000 INJECTION INTRAVENOUS; SUBCUTANEOUS at 06:23

## 2022-01-20 RX ADMIN — INSULIN ASPART 30 UNITS: 100 INJECTION, SOLUTION INTRAVENOUS; SUBCUTANEOUS at 08:39

## 2022-01-20 RX ADMIN — HYDROCODONE BITARTRATE AND ACETAMINOPHEN 1 TABLET: 5; 325 TABLET ORAL at 08:46

## 2022-01-20 RX ADMIN — ISOSORBIDE MONONITRATE 30 MG: 30 TABLET, EXTENDED RELEASE ORAL at 06:36

## 2022-01-20 RX ADMIN — ASPIRIN 81 MG: 81 TABLET, FILM COATED ORAL at 08:37

## 2022-01-20 RX ADMIN — NYSTATIN: 100000 POWDER TOPICAL at 08:44

## 2022-01-20 NOTE — SIGNIFICANT NOTE
01/20/22 0950   OTHER   Discipline physical therapy assistant   Rehab Time/Intention   Session Not Performed patient/family declined treatment  (Pt. reports plans to d/c home today & no issues regarding d/c at this time.)

## 2022-01-20 NOTE — PROGRESS NOTES
FAMILY MEDICINE RESIDENCY SERVICE  DAILY PROGRESS NOTE    NAME: Yamileth Slater  : 1960  MRN: [id number]      LOS: 10 days     PROVIDER OF SERVICE: Luz Quijano MD    Chief Complaint: Physical deconditioning    Subjective:     Interval History:  History taken from: patient chart    Patient was seen resting comfortably in bed this AM. Yesterday she fell in the bathroom and subsequently had imaging of head, hips and shoulders all of which came back within normal limits. This morning she is complaining of persistent pain however states it is better than yesterday. She has bengay to alleviate pain. Had a lengthy conversation on the importance of wearing slipper socks and calling for help anytime she would like to use the restroom or move from the bathroom. This morning I placed a cgm sensor on her abdomen and explained how to use this device. Stressed the importance of following up with her PCP to evaluate the sensor and make insulin adjustments as necessary. She expressed understanding and stated she will come to clinic appointments. Denies soa, chest pain, lightheadedness.       Review of Systems:   Review of Systems   Constitutional: Negative.    Respiratory: Negative.    Cardiovascular: Negative.    Gastrointestinal: Negative.    Endocrine: Negative.    Skin: Negative.    Neurological: Negative.  Negative for headaches.   Psychiatric/Behavioral: Negative.  Negative for suicidal ideas.       Objective:     Vital Signs  Temp:  [96.8 °F (36 °C)-97.4 °F (36.3 °C)] 97.4 °F (36.3 °C)  Heart Rate:  [63-76] 68  Resp:  [16-20] 20  BP: (121-174)/(56-77) 174/77  Flow (L/min):  [3] 3   Body mass index is 46.09 kg/m².    Physical Exam  Physical Exam  Constitutional:       Appearance: Normal appearance. She is normal weight.   HENT:      Head: Normocephalic and atraumatic.      Nose: Nose normal.      Mouth/Throat:      Mouth: Mucous membranes are moist.   Cardiovascular:      Rate and Rhythm: Normal rate and  regular rhythm.      Pulses: Normal pulses.      Heart sounds: Normal heart sounds.   Pulmonary:      Effort: Pulmonary effort is normal.      Breath sounds: Normal breath sounds.   Abdominal:      General: Abdomen is flat. Bowel sounds are normal.      Palpations: Abdomen is soft.   Musculoskeletal:         General: Normal range of motion.      Cervical back: Normal range of motion.      Right lower leg: No edema.      Left lower leg: No edema.   Skin:     General: Skin is warm and dry.      Capillary Refill: Capillary refill takes less than 2 seconds.   Neurological:      General: No focal deficit present.      Mental Status: She is alert and oriented to person, place, and time.   Psychiatric:         Mood and Affect: Mood normal.         Behavior: Behavior normal.         Scheduled Meds   aspirin, 81 mg, Oral, Daily  atorvastatin, 20 mg, Oral, Daily  bumetanide, 2 mg, Oral, Once per day on Sun Tue Thu Sat  clopidogrel, 75 mg, Oral, Daily  epoetin hubert/hubert-epbx, 10,000 Units, Intravenous, Once per day on Mon Wed Fri  gabapentin, 300 mg, Oral, Q8H  heparin (porcine), 5,000 Units, Subcutaneous, Q8H  insulin aspart, 0-24 Units, Subcutaneous, TID AC  insulin aspart, 30 Units, Subcutaneous, TID With Meals  insulin detemir, 30 Units, Subcutaneous, Daily  insulin detemir, 35 Units, Subcutaneous, Nightly  ipratropium-albuterol, 3 mL, Nebulization, 4x Daily - RT  isosorbide mononitrate, 30 mg, Oral, QAM  levothyroxine, 25 mcg, Oral, Daily  lidocaine, 1 patch, Transdermal, Q24H  lisinopril, 5 mg, Oral, Daily  metoprolol tartrate, 25 mg, Oral, Q12H  midodrine, 5 mg, Oral, Once per day on Mon Wed Fri  montelukast, 10 mg, Oral, Nightly  nystatin, , Topical, Q12H  oxybutynin XL, 5 mg, Oral, Nightly  pantoprazole, 40 mg, Oral, Q AM  ranolazine, 500 mg, Oral, Q12H  senna-docusate sodium, 2 tablet, Oral, BID  sodium chloride, 10 mL, Intravenous, Q12H       PRN Meds   •  acetaminophen **OR** acetaminophen **OR** acetaminophen  •   albuterol  •  senna-docusate sodium **AND** polyethylene glycol **AND** bisacodyl **AND** bisacodyl  •  dextrose  •  dextrose  •  dextrose  •  glucagon (human recombinant)  •  heparin (porcine)  •  heparin (porcine)  •  heparin (porcine)  •  hydrALAZINE  •  HYDROcodone-acetaminophen  •  hydrocortisone-bacitracin-zinc oxide-nystatin  •  labetalol  •  muscle rub  •  ondansetron  •  sodium chloride  •  sodium chloride  •  sodium chloride  •  sodium chloride      Diagnostic Data    Results from last 7 days   Lab Units 01/20/22  0622   WBC 10*3/mm3 7.68   HEMOGLOBIN g/dL 7.8*   HEMATOCRIT % 24.5*   PLATELETS 10*3/mm3 332   GLUCOSE mg/dL 252*   CREATININE mg/dL 4.65*   BUN mg/dL 31*   SODIUM mmol/L 127*   POTASSIUM mmol/L 5.5*   ANION GAP mmol/L 8.0       XR Shoulder 2+ View Right    Result Date: 1/19/2022  Negative shoulder. Electronically signed by:  Young Johnson MD  1/19/2022 10:22 AM CST Workstation: [id number]    CT Head Without Contrast    Result Date: 1/19/2022  1. Chronic changes with generalized cortical atrophy and evidence of chronic ischemic microvascular disease. 2. No radiographic evidence of acute intracranial pathology. 3. Findings as above indicative of mastoiditis on the left. Electronically signed by:  Hilda Orellana MD  1/19/2022 10:07 AM CST Workstation: [id number]    XR Hip With or Without Pelvis 2 - 3 View Right    Result Date: 1/19/2022  No acute AP pelvis or right hip abnormality. Electronically signed by:  Young Johnson MD  1/19/2022 10:20 AM CST Workstation: [id number]        I reviewed the patient's new clinical results.    Assessment/Plan:     Active Hospital Problems    Diagnosis  POA   • **Physical deconditioning [R53.81]  Yes     Priority: High     - PT/OT   - Home health with PT      • Type 2 diabetes mellitus with kidney complication, with long-term current use of insulin (HCC) [E11.29, Z79.4]  Not Applicable     Priority: High     - Levemir 30units AM. 35 units PM  - Novolog 30 with  meals  - SSI  -HgA1c 10/2021 : 8.80  -CGM placed this AM        • Personal history of noncompliance with medical treatment, presenting hazards to health [Z91.19]  Not Applicable     Priority: Medium     · Difficulty showing up to clinic visits due to complexity of medical problems and transportation  · Would really benefit from a couple home visits from PCP to help improve compliance       • Anemia due to chronic kidney disease, on chronic dialysis (HCC) [N18.6, D63.1, Z99.2]  Not Applicable     Priority: Medium     - MWF Dialysis patient - Nephrology following   - Epoetin (Retacrit) 10,000 units three times a week on dialysis days MWF  - Hg 7 on admission. Transfuse if Hg less than 7   - 1/14: Hg 8   - 1/15: Hg 7.7   - Daily CBC   - Type and screen done   -1/15 : Tunnel Catheter possibly need to be replaced after next HD session on Monday due to poor catheter flow vs loss of function. Will coordinate with nephrology if catheter can be replaced sooner as patient is awaiting SNF placement.            • Unspecified diastolic (congestive) heart failure (HCC) [I50.30]  Yes     Priority: Low     - Continue Imdur, metoprolol, lisinopril, bumex  - pro-BNP 61736 on admission  - Echo 1/10/2022 : EF 61-65%  -Cardiology on board (Dr. Handy) - appreciate recommendations  - MWF Dialysis patient     • MIKE and COPD overlap syndrome (HCC) [G47.33, J44.9]  Yes     Priority: Low     · Home Trilogy/CPAP  · Home 3L oxygen dependent. Maintain strict Sats between 88-92%      • Hypothyroidism [E03.9]  Yes     Priority: Low     Continue home Levothyroxine 25mcg, stable     • Coronary artery disease [I25.10]  Yes     Priority: Low     · S/P CABG x 3 (Primary), Bilateral carotid artery stenosis w/ 2 stents on left side,  PAD (peripheral artery disease) with stent in right upper leg  · Continue   - aspirin 81mg   - Plavix 75mg daily  - atorvastatin 20 mg daily  - lisinopril 5mg daily  -lopressor 25mg BID  -Imdur 30mg daily  - Ranexa - 500mg  BID   · EKG - sinus tachycardia             • COPD (chronic obstructive pulmonary disease) ([facility]) [J44.9]  Yes     Priority: Low     - Dependent on 3L NC at Home  - Maintain strict sats between 88-92%  -Singulair 10mg   -DuoNebs  -NIPPV via Respiratory. Patient uses Trilogy at home/night      • Hypertension [I10]  Yes     Priority: Low       · Metoprolol 25mg bid, hold if HR < 60bpm  · Lisinopril 5mg daily  · Telemetry  · Hydralazine 10mg prn q6h for SBP > 170  · Labetalol 20mg Q6h >160           DVT prophylaxis: Heparin  Code status is   Code Status and Medical Interventions:   Ordered at: 01/09/22 1508     Level Of Support Discussed With:    Next of Kin (If No Surrogate)     Code Status (Patient has no pulse and is not breathing):    CPR (Attempt to Resuscitate)     Medical Interventions (Patient has pulse or is breathing):    Full Support     Comments:    Discussed with  Huy Slater at bedside       Plan for disposition:Where: home and When:  today      Time: 20 mins         This document has been electronically signed by Luz Quijano MD on January 20, 2022 07:37 CST

## 2022-01-20 NOTE — PLAN OF CARE
Goal Outcome Evaluation:            Pt finished dialysis at beginning of shift, 2.5 L removed. Pt tolerated well. Pt on fall risk protocol r/t fall yesterday. Bed alarm in place.

## 2022-01-20 NOTE — OUTREACH NOTE
Prep Survey      Responses   [facility] facility patient discharged from? [facility]   Is LACE score < 7 ? No   Emergency Room discharge w/ pulse ox? No   Eligibility [facility]   Date of Admission 01/09/22   Date of Discharge 01/20/22   Discharge Disposition Home-Health Care Svc   Discharge diagnosis Physical deconditioning  Acute cystitis with hematuria Altered mental status    Does the patient have one of the following disease processes/diagnoses(primary or secondary)? Other   Does the patient have Home health ordered? Yes   What is the Home health agency?  [facility]    Is there a DME ordered? No   Prep survey completed? Yes          Dayna Dueñas RN

## 2022-01-20 NOTE — DISCHARGE PLACEMENT REQUEST
"Yamileth Slater (61 y.o. Female)             Date of Birth Social Security Number Address Home Phone MRN    1960  [address]  [place] [address] [phone] [id number]    [employer] Status             None        Admission Date Admission Type Admitting Provider Attending Provider Department, Room/Bed    1/9/22 Emergency Kit Brar MD McNevin, James, MD [facility] 3 [facility], 382/1    Discharge Date Discharge Disposition Discharge Destination           Home-Health Care [facility]              Attending Provider: Huy Santa MD    Allergies: Adhesive Tape, Other    Isolation: Contact   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                 Active Insurance as of 1/9/2022     Primary Coverage     Payor Plan Insurance Group Employer/Plan Group    ANTHEM MEDICARE REPLACEMENT ANTHEM MEDICARE ADVANTAGE [id number]     Payor Plan Address Payor Plan Phone Number Payor Plan Fax Number Effective Dates    [address] [phone]  1/1/2022 - None Entered    [facility] [address]       Subscriber Name Subscriber Birth Date Member ID       YAMILETH SLATER 1960 [id number]           Secondary Coverage     Payor Plan Insurance Group Employer/Plan Group    [place]      Payor Plan Address Payor Plan Phone Number Payor Plan Fax Number Effective Dates    [address] [phone]  6/28/2019 - None Entered    [facility] [id number]       Subscriber Name Subscriber Birth Date Member ID       YAMILETH SLATER 1960 [id number]                 Emergency Contacts      (Rel.) Home Phone Work Phone Mobile Phone    Yamileth Chavez (Daughter) [phone] -- [phone]    Suzanne Rivera (Daughter) [phone] -- [phone]    " Huy Slater (Spouse) [phone] -- [phone]          Ambulatory Referral to Home Health [CMP005] (Order [id number])  Order  Date: 1/19/2022 Department: [address] Ordering/Authorizing: Paulina Solano MD       Order History  Outpatient  Date/Time Action Taken User Additional Information   01/19/22 0751 Sign Paulina Solano MD      Order Details    Frequency Duration Priority Order Class   None None Routine Internal Referral     Start Date/Time    Start Date   01/19/22     Order Information    Order Date Service Start Date Start Time   01/19/22 Family Medicine Residency - Unassigned ([facility]AD Only) 01/19/22      Reference Links    Associated Reports External References   View Encounter Current Health Referral Information     Order Questions    Question Answer   Face to Face Visit Date: 1/19/2022   Follow-up provider for Plan of Care? I treated the patient in an acute care facility and will not continue treatment after discharge.   Follow-up provider: MAITE QUINN   Reason/Clinical Findings deconditioning   Describe mobility limitations that make leaving home difficult: deconditioning, dialysis   Nursing/Therapeutic Services Requested Skilled Nursing    Physical Therapy   Skilled nursing orders: Medication education    Neurovascular assessments   PT orders: Therapeutic exercise    Gait Training    Home safety assessment   Weight Bearing Status As Tolerated   Frequency: 1 Week 1            Source Order Set / Preference List    Order Set   [facility] GEN EXPRESS DISCHARGE [[id number]]     Clinical Indications     ICD-10-CM ICD-9-CM   Physical deconditioning     R53.81 799.3     Reprint Order Requisition    Ambulatory Referral to Home Health (Order #[id number]) on 1/19/22     Encounter    View Encounter            Order Provider Info        Office phone Pager E-mail   Ordering User Paulina Solano [phone] -- --   Authorizing Provider Paulina Solano MD  [phone] -- --   Attending Provider Huy Santa [phone] -- --     Tracking Reports    Cosign Tracking Order Transmittal Tracking   Authorized by:  Paulina Solano MD  (NPI: [id number])            Lab Component SmartPhrase Guide    Ambulatory Referral to Home Health (Order #[id number]) on 1/19/22         Discharge Order (From admission, onward)     Start     Ordered    01/20/22 1000  Discharge patient  Once        Expected Discharge Date: 01/20/22    Discharge Disposition: Home-Health Care [facility]    Physician of Record for Attribution - Please select from Treatment Team: RAFAT SALCEDO [[id number]]    Review needed by CMO to determine Physician of Record: No       Question Answer Comment   Physician of Record for Attribution - Please select from Treatment Team RAFAT SALCEDO    Review needed by CMO to determine Physician of Record No        01/20/22 1020

## 2022-01-20 NOTE — PROGRESS NOTES
"[facility]  [address] [place] [address]. [address]  T - [phone]  F - [phone]     Progress Note          PATIENT  DEMOGRAPHICS   PATIENT NAME: Yamileth Slater                      PHYSICIAN: Timothy Valle MD  : 1960  MRN: [id number]   LOS: 10 days    Patient Care Team:  Rianna Macias MD as PCP - General (Family Medicine)  Subjective   SUBJECTIVE   No acute events overnight. Denied chest pain, SOB.          Objective   OBJECTIVE   Vital Signs  Temp:  [96.8 °F (36 °C)-97.4 °F (36.3 °C)] 97.3 °F (36.3 °C)  Heart Rate:  [63-76] 70  Resp:  [18-20] 20  BP: (121-174)/(56-77) 174/62    Flowsheet Rows      First Filed Value   Admission Height 167.6 cm (66\") Documented at 2022 1304   Admission Weight 132 kg (291 lb 9.6 oz) Documented at 2022 1048           I/O last 3 completed shifts:  In: 480 [P.O.:480]  Out: 4150 [Urine:1650; Other:2500]    PHYSICAL EXAM    Physical Exam  Vitals and nursing note reviewed.   Constitutional:       General: She is not in acute distress.  Cardiovascular:      Rate and Rhythm: Normal rate and regular rhythm.      Heart sounds: Normal heart sounds. No murmur heard.  No friction rub. No gallop.    Pulmonary:      Effort: No respiratory distress.      Breath sounds: Normal breath sounds. No wheezing, rhonchi or rales.   Abdominal:      General: Bowel sounds are normal. There is no distension.      Palpations: Abdomen is soft.      Tenderness: There is no abdominal tenderness.   Musculoskeletal:         General: No swelling.   Skin:     General: Skin is warm and dry.   Neurological:      Mental Status: She is alert.         RESULTS   Results Review:    Results from last 7 days   Lab Units 22  0622 22  0537 22  0623   SODIUM mmol/L 127* 129* 127*   POTASSIUM mmol/L 5.5* 5.3* 5.1   CHLORIDE mmol/L 93* 92* 90*   CO2 mmol/L 26.0 23.0 22.0   BUN mg/dL 31* 46* 40*   CREATININE mg/dL 4.65* 6.25* 5.88*   CALCIUM mg/dL 8.9 " 8.8 8.8   GLUCOSE mg/dL 252* 238* 221*       Estimated Creatinine Clearance: 17.3 mL/min (A) (by C-G formula based on SCr of 4.65 mg/dL (H)).                Results from last 7 days   Lab Units 01/20/22  0622 01/19/22  0537 01/18/22  0623 01/17/22  0606 01/16/22  0701   WBC 10*3/mm3 7.68 6.31 6.73 6.84 6.84   HEMOGLOBIN g/dL 7.8* 7.6* 8.1* 7.5* 7.6*   PLATELETS 10*3/mm3 332 311 351 271 266               Imaging Results (Last 24 Hours)     ** No results found for the last 24 hours. **           MEDICATIONS    aspirin, 81 mg, Oral, Daily  atorvastatin, 20 mg, Oral, Daily  bumetanide, 2 mg, Oral, Once per day on Sun Tue Thu Sat  clopidogrel, 75 mg, Oral, Daily  epoetin hubert/hubert-epbx, 10,000 Units, Intravenous, Once per day on Mon Wed Fri  gabapentin, 300 mg, Oral, Q8H  heparin (porcine), 5,000 Units, Subcutaneous, Q8H  insulin aspart, 0-24 Units, Subcutaneous, TID AC  insulin aspart, 30 Units, Subcutaneous, TID With Meals  insulin detemir, 30 Units, Subcutaneous, Daily  insulin detemir, 35 Units, Subcutaneous, Nightly  ipratropium-albuterol, 3 mL, Nebulization, 4x Daily - RT  isosorbide mononitrate, 30 mg, Oral, QAM  levothyroxine, 25 mcg, Oral, Daily  lidocaine, 1 patch, Transdermal, Q24H  lisinopril, 5 mg, Oral, Daily  metoprolol tartrate, 25 mg, Oral, Q12H  midodrine, 5 mg, Oral, Once per day on Mon Wed Fri  montelukast, 10 mg, Oral, Nightly  nystatin, , Topical, Q12H  oxybutynin XL, 5 mg, Oral, Nightly  pantoprazole, 40 mg, Oral, Q AM  ranolazine, 500 mg, Oral, Q12H  senna-docusate sodium, 2 tablet, Oral, BID  sodium chloride, 10 mL, Intravenous, Q12H      O2, 3 L/min, Last Rate: 3 L/min (01/09/22 4240)  sodium chloride, 10 mL/hr        Assessment/Plan   ASSESSMENT / PLAN      Physical deconditioning    Hypertension    COPD (chronic obstructive pulmonary disease) (HCC)    Coronary artery disease    Hypothyroidism    MIKE and COPD overlap syndrome (HCC)    Unspecified diastolic (congestive) heart failure (HCC)     Anemia due to chronic kidney disease, on chronic dialysis (HCC)    Personal history of noncompliance with medical treatment, presenting hazards to health    Type 2 diabetes mellitus with kidney complication, with long-term current use of insulin ([facility])    1.  ESRD on HD:  - Initiated HD on 10/21 due to hyperkalemia and fluid overload, now  ESRD.  HD MWF for now.  Keep bumex 2mg on non dialysis days. Euvolemic at present.    - Keep midodrine pre dialysis. Catheter worked well after alteplase and able to get 300 - 350 blood flow.   - Received HD yesterday with UF of 2.5 kgs. Tolerated well. No HD needs today. Next HD tomorrow as outpatient.      2.  Hyponatremia:  - In the setting of severe hyperglycemia, hyperosmolar hyponatremia. Now better with glucose correction  - Fluid restriction of 32 ounces day     3.  HHS: Improved  - Off insulin drip. On aspart and levemir     4. CAD     5. COPD      6. Anemia / previous GI bleed / b12 deficiency:  - s/p capsule endoscopy which showed few small non-bleeding intestinal AVMs and single small polyp.   - Hemoglobin is low at 7.8. Epogen with HD.     7.  Hypertension:  - Continue home antihypertensives. Metoprolol lowered to 25mg BID     8. Fall:  - CT head negative.            This document has been electronically signed by Timothy Valle MD on January 20, 2022 11:32 CST

## 2022-01-20 NOTE — THERAPY TREATMENT NOTE
Patient Name: Yamileth Slater  : 1960    MRN: [id number]                              Today's Date: 2022       Admit Date: 2022    Visit Dx:     ICD-10-CM ICD-9-CM   1. Diabetic ketoacidosis with coma associated with other specified diabetes mellitus (HCC)  E13.11 250.32   2. Hypertension, unspecified type  I10 401.9   3. Altered mental status, unspecified altered mental status type  R41.82 780.97   4. Dysphagia, unspecified type  R13.10 787.20   5. Impaired functional mobility, balance, gait, and endurance  Z74.09 V49.89   6. Impaired mobility and ADLs  Z74.09 V49.89    Z78.9    7. Physical deconditioning  R53.81 799.3     Patient Active Problem List   Diagnosis   • Chronic obstructive pulmonary disease (HCC)   • Chronic midline low back pain without sciatica   • Vitamin D deficiency   • Tobacco dependence syndrome   • Surgical follow-up care   • Shoulder joint pain   • Peripheral vascular disease ([facility])   • Pain   • Neurologic disorder associated with diabetes mellitus ([facility])   • Morbid obesity with BMI of 50.0-59.9, adult ([facility])   • Mixed hyperlipidemia   • Kidney stone   • History of colon polyps   • GERD (gastroesophageal reflux disease)   • Excoriated eczema   • Hypertension   • Encounter for medication refill   • Dyslipidemia   • Diabetes mellitus ([facility])   • COPD (chronic obstructive pulmonary disease) ([facility])   • Chronic folliculitis   • Coronary artery disease   • Mild persistent asthma   • 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   • Hypothyroidism   • Carotid artery disease ([facility])   • Current smoker   • [place] abuse   • PAD (peripheral artery disease) ([facility])   • Intercostal pain   • Venous insufficiency   • LAFB (left anterior fascicular block)   • Pulmonary hypertension ([facility])   • Left hip pain   • Neck pain   • CKD (chronic kidney disease), symptom management only, stage 5 ([facility])   • MIKE and COPD overlap syndrome ([facility])   • Pneumonia due to COVID-19 virus   • Hyperkalemia    • Cutaneous candidiasis   • Community acquired pneumonia of right middle lobe of lung   • MRSA infection   • Alkaline phosphatase elevation   • COVID-19 ruled out by laboratory testing   • Esophageal dysphagia   • Monilial esophagitis ([facility])   • NSTEMI, initial episode of care ([facility])   • Pneumonia due to infectious organism   • Cellulitis of left leg   • Unspecified diastolic (congestive) heart failure ([facility])   • Hyponatremia   • Urinary tract infection due to Proteus   • History of insertion of tunneled central venous catheter (CVC) with port   • Anemia due to chronic kidney disease, on chronic dialysis ([facility])   • Pneumonitis   • Personal history of noncompliance with medical treatment, presenting hazards to health   • Type 2 diabetes mellitus with kidney complication, with long-term current use of insulin ([facility])   • Physical deconditioning     Past Medical History:   Diagnosis Date   • Acute blood loss anemia 4/16/2017    Likely due to gastric oozing at this time. - Dr. Duarte (GI) was consulted and has now signed off, will follow up outpatient - pill colonoscopy showed AVMs - continue to monitor   • Acute cystitis with hematuria 3/31/2021    1/13: IV Rocephin 1 gm q 24 1/14 : transitioned  to omnicef 300mg. Urine cultures resulted and did not show growth. Omnicef discontinued as patient is asymptomatic   • Altered mental status 1/9/2022    - AMS on presentation - initial ABG pH 7.3, CO2 34 - Procal 0.29 - UA negative for acute cysitits -CTA head wnl  - Empiric Zosyn and Vancomycin -Lactate 2.5 on admission  - blood cultures no growth at 24 hours     • Anxiety    • CAD (coronary artery disease) 4/24/2021    S/P 3 stents 5/1/2021 for [facility] Continue ASA 81mg & Clopidogrel 75mg Continue Atorvastatin 40mg   • Carotid artery stenosis    • Chronic obstructive lung disease (HCC)    • CKD (chronic kidney disease) stage 4, GFR 15-29 ml/min ([facility])    • Colonic polyp    • Coronary arteriosclerosis    • Diabetes mellitus ([facility])    •  Diabetic neuropathy (HCC)    • Ear pain, right 10/18/2021    - canal trauma due to patient scratching and DMT2 - added cortisporin ear drops   • Elevated troponin 10/12/2021    -most likely from CKD -Trending down -Neg chest pain   • GERD (gastroesophageal reflux disease)    • GI bleed 5/13/2021    - GI will follow up outpatient - Protonix 40mg daily - Avoid medical DVT prophy and use mechanical at this time instead. - Continue to monitor - pill colonoscopy results showed AVMs   • History of transfusion    • Hypercholesterolemia    • Hypertension    • Hypomagnesemia 6/27/2021    Monitor and replace   • Morbid obesity ([facility])    • Nephrolithiasis    • Peripheral vascular disease ([facility])    • SIRS (systemic inflammatory response syndrome) ([facility]) 1/9/2022    Admission  - WBC 17.78   -   - RR 16 - 1/10: VSS/wnl - CXR - Mild pulm edema - Blood cultures no growth at 24 hours  - Procalcitonin 0.29 - UA : glucose 1000, negative Leucocytes/nitrate - Empiric Zosyn and Vancomcyin    • Sleep apnea    • Substance abuse ([facility])    • Vitamin D deficiency      Past Surgical History:   Procedure Laterality Date   • CARDIAC CATHETERIZATION N/A 7/14/2020   • CARDIAC CATHETERIZATION N/A 4/23/2021    Procedure: Left Heart Cath;  Surgeon: Melba Romo MD;  Location: [facility] CATH INVASIVE LOCATION;  Service: Cardiology;  Laterality: N/A;   • CARDIAC CATHETERIZATION N/A 4/30/2021    Procedure: Percutaneous Coronary Intervention;  Surgeon: Russell Voss MD;  Location: [facility] CATH INVASIVE LOCATION;  Service: Cardiovascular;  Laterality: N/A;   • CARDIAC CATHETERIZATION N/A 4/30/2021    Procedure: Stent NIKKI coronary;  Surgeon: Russell Voss MD;  Location: [facility] CATH INVASIVE LOCATION;  Service: Cardiovascular;  Laterality: N/A;   • CARDIAC CATHETERIZATION Left 11/13/2021    Procedure: Left Heart Cath;  Surgeon: Niall Rios MD;  Location: [facility] CATH INVASIVE LOCATION;  Service: Cardiology;  Laterality: Left;   •  CAROTID STENT Left    • COLONOSCOPY     • COLONOSCOPY N/A 5/14/2021    Procedure: COLONOSCOPY;  Surgeon: Mingo Duarte MD;  Location: [facility] ENDOSCOPY;  Service: Gastroenterology;  Laterality: N/A;   • CORONARY ARTERY BYPASS GRAFT N/A 2013    CABG X 3   • CYSTOSCOPY BLADDER STONE LITHOTRIPSY Bilateral    • ENDOSCOPY N/A 4/12/2021    Procedure: ESOPHAGOGASTRODUODENOSCOPY;  Surgeon: Mingo Duarte MD;  Location: [facility] ENDOSCOPY;  Service: Gastroenterology;  Laterality: N/A;   • ENDOSCOPY N/A 5/14/2021    Procedure: ESOPHAGOGASTRODUODENOSCOPY;  Surgeon: Mingo Duarte MD;  Location: [facility] ENDOSCOPY;  Service: Gastroenterology;  Laterality: N/A;   • INTERVENTIONAL RADIOLOGY PROCEDURE N/A 10/21/2021    Procedure: tunneled central venous catheter placement;  Surgeon: Donnie Robles MD;  Location: [facility] ANGIO INVASIVE LOCATION;  Service: Interventional Radiology;  Laterality: N/A;      General Information     Row Name 01/20/22 1342          OT Time and Intention    Document Type progress note/recertification  -LM     Mode of Treatment individual therapy  -LM           User Key  (r) = Recorded By, (t) = Taken By, (c) = Cosigned By    Initials Name Provider Type    LM Sigrid Ross COTA Occupational Therapy Assistant                 Mobility/ADL's     Row Name 01/20/22 1342          Bed Mobility    Bed Mobility bed mobility (all) activities  -LM     All Activities, [place] (Bed Mobility) contact guard assist  -LM     Scooting/Bridging [place] (Bed Mobility) contact guard  -LM     Supine-Sit [place] (Bed Mobility) contact guard  -LM     Sit-Supine [place] (Bed Mobility) contact guard  -LM     Row Name 01/20/22 1342          Transfers    Transfers sit-stand transfer; toilet transfer  -LM     Sit-Stand [place] (Transfers) contact guard  -LM     [place] Level (Toilet Transfer) contact guard  -LM     Assistive Device (Toilet Transfer) commode, 3-in-1  -LM     Row Name  01/20/22 1342          Toilet Transfer    Type (Toilet Transfer) sit-stand; stand-sit; stand pivot/stand step  -LM     Row Name 01/20/22 1342          Activities of Daily Living    BADL Assessment/Intervention bathing; toileting  -LM           User Key  (r) = Recorded By, (t) = Taken By, (c) = Cosigned By    Initials Name Provider Type    Sigrid Dunn COTA Occupational Therapy Assistant               Obj/Interventions     Row Name 01/20/22 1346          Therapeutic Exercise    Therapeutic Exercise shoulder; elbow/forearm  sat on eob during act and some ther ex b ue  -LM           User Key  (r) = Recorded By, (t) = Taken By, (c) = Cosigned By    Initials Name Provider Type    Sigrid Dunn COTA Occupational Therapy Assistant               Goals/Plan     Row Name 01/20/22 1346          Transfer Goal 1 (OT)    Activity/Assistive Device (Transfer Goal 1, OT) toilet  -LM     [place] Level/Cues Needed (Transfer Goal 1, OT) standby assist  -LM     Time Frame (Transfer Goal 1, OT) long term goal (LTG); by discharge  -LM     Progress/Outcome (Transfer Goal 1, OT) goal not met  -LM     Row Name 01/20/22 1346          Bathing Goal 1 (OT)    Activity/Device (Bathing Goal 1, OT) lower body bathing  -LM     [place] Level/Cues Needed (Bathing Goal 1, OT) minimum assist (75% or more patient effort)  -LM     Time Frame (Bathing Goal 1, OT) long term goal (LTG); by discharge  -LM     Progress/Outcomes (Bathing Goal 1, OT) goal not met  -LM     Row Name 01/20/22 1346          Dressing Goal 1 (OT)    Activity/Device (Dressing Goal 1, OT) lower body dressing  -LM     [place]/Cues Needed (Dressing Goal 1, OT) minimum assist (75% or more patient effort)  -LM     Time Frame (Dressing Goal 1, OT) long term goal (LTG); by discharge  -LM     Progress/Outcome (Dressing Goal 1, OT) goal not met  -LM     Row Name 01/20/22 1346          Toileting Goal 1 (OT)    Activity/Device (Toileting Goal 1, OT) toileting  skills, all  -LM     [place] Level/Cues Needed (Toileting Goal 1, OT) minimum assist (75% or more patient effort)  -LM     Time Frame (Toileting Goal 1, OT) long term goal (LTG); by discharge  -LM     Progress/Outcome (Toileting Goal 1, OT) goal not met  -LM           User Key  (r) = Recorded By, (t) = Taken By, (c) = Cosigned By    Initials Name Provider Type    LM Sigrid Ross COTA Occupational Therapy Assistant               Clinical Impression    No documentation.                Outcome Measures    No documentation.                 Occupational Therapy Education                 Title: PT OT SLP Therapies (Resolved)     Topic: Occupational Therapy (Resolved)     Point: ADL training (Resolved)     Description:   Instruct learner(s) on proper safety adaptation and remediation techniques during self care or transfers.   Instruct in proper use of assistive devices.              Learner Progress:  Not documented in this visit.          Point: Home exercise program (Resolved)     Description:   Instruct learner(s) on appropriate technique for monitoring, assisting and/or progressing therapeutic exercises/activities.              Learner Progress:  Not documented in this visit.          Point: Precautions (Resolved)     Description:   Instruct learner(s) on prescribed precautions during self-care and functional transfers.              Learning Progress Summary           Patient Acceptance, E,TB, VU by  at 1/12/2022 1553    Comment: POC, role of OT, transfer training   Family Acceptance, E,TB, VU by  at 1/12/2022 1553    Comment: POC, role of OT, transfer training                   Point: Body mechanics (Resolved)     Description:   Instruct learner(s) on proper positioning and spine alignment during self-care, functional mobility activities and/or exercises.              Learning Progress Summary           Patient Acceptance, E,TB, VU by  at 1/12/2022 1553    Comment: POC, role of OT, transfer training    Family Acceptance, E,TB, VU by  at 1/12/2022 1553    Comment: POC, role of OT, transfer training                               User Key     Initials Effective Dates Name Provider Type Discipline     06/14/21 -  Ottoniel Robles, OT Occupational Therapist OT              OT Recommendation and Plan           Time Calculation:    Time Calculation- OT     Row Name 01/20/22 1336             Time Calculation- OT    OT Start Time 1003  -LM      OT Stop Time 1042  -LM      OT Time Calculation (min) 39 min  -LM      Total Timed Code Minutes- OT 39 minute(s)  -LM      OT Received On 01/20/22  -LM              Timed Charges    52925 - OT Therapeutic Activity Minutes 24  -LM      00500 - OT Self Care/Mgmt Minutes 15  -LM              Total Minutes    Timed Charges Total Minutes 39  -LM       Total Minutes 39  -LM            User Key  (r) = Recorded By, (t) = Taken By, (c) = Cosigned By    Initials Name Provider Type    LM Sigrid Ross COTA Occupational Therapy Assistant              Therapy Charges for Today     Code Description Service Date Service Provider Modifiers Qty    97445758474 HC OT THERAPEUTIC ACT EA 15 MIN 1/20/2022 Sigrid Ross COTA GO 1    71862660333 HC OT SELF CARE/MGMT/TRAIN EA 15 MIN 1/20/2022 Sigrid Ross COTA GO 2               ISHAN Kerns  1/20/2022

## 2022-01-20 NOTE — SIGNIFICANT NOTE
Spoke with Nephrology (Dr. Jacobo) this AM regarding patient's hyponatremia. Patient is asymptomatic from hyponatremia standpoint. Nephrology is aware of this and will correct electrolyte imbalance during patient's dialysis sessions. Recommended 35 ounce fluid restriction and agreeable with discharge.         This document has been electronically signed by Luz Quijano MD on January 20, 2022 10:27 CST

## 2022-01-21 ENCOUNTER — TRANSITIONAL CARE MANAGEMENT TELEPHONE ENCOUNTER (OUTPATIENT)
Dept: CALL CENTER | Facility: HOSPITAL | Age: 62
End: 2022-01-21

## 2022-01-21 NOTE — OUTREACH NOTE
Call Center TCM Note      Responses   [facility] patient discharged from? [place]   Does the patient have one of the following disease processes/diagnoses(primary or secondary)? Other   TCM attempt successful? No   Unsuccessful attempts Attempt 2          Noah Green RN    1/21/2022, 11:27 EST

## 2022-01-21 NOTE — PAYOR COMM NOTE
"Komal [place]  [facility]  Case Management Extender  [phone] phone  [phone] fax      Auth# [id number]    Yamileth Slater (61 y.o. 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                 Active Insurance as of 1/9/2022     Primary Coverage     Payor Plan Insurance Group Employer/Plan Group    ANTHEM MEDICARE REPLACEMENT ANTHEM MEDICARE ADVANTAGE [id number]     Payor Plan Address Payor Plan Phone Number Payor Plan Fax Number Effective Dates    [address] [phone]  1/1/2022 - None Entered    [facility] [address]       Subscriber Name Subscriber Birth Date Member ID       YAMILETH SLATER 1960 [id number]           Secondary Coverage     Payor Plan Insurance Group Employer/Plan Group    [place]      Payor Plan Address Payor Plan Phone Number Payor Plan Fax Number Effective Dates    [address] [phone]  6/28/2019 - None Entered    [place] [address]       Subscriber Name Subscriber Birth Date Member ID       YAMILETH SLATER 1960 [id number]                 Emergency Contacts      (Rel.) Home Phone Work Phone Mobile Phone "    Yamileth Chavez (Daughter) [phone] -- [phone]    Suzanne Rivera (Daughter) [phone] -- [phone]    Huy Slater (Spouse) [phone] -- [phone]               Discharge Summary      Luz Quijano MD at 22 0835              DISCHARGE SUMMARY    PATIENT NAME: Yamileth Sylvester Bryon       PHYSICIAN: Luz Quijano MD  : 1960  MRN: [id number]    ADMITTED: 2022     DISCHARGED: 2022    ADMISSION DIAGNOSES:  Active Hospital Problems    Diagnosis  POA   • **Physical deconditioning [R53.81]  Yes     Priority: High   • Type 2 diabetes mellitus with kidney complication, with long-term current use of insulin (HCC) [E11.29, Z79.4]  Not Applicable     Priority: High   • Personal history of noncompliance with medical treatment, presenting hazards to health [Z91.19]  Not Applicable     Priority: Medium   • Anemia due to chronic kidney disease, on chronic dialysis (HCC) [N18.6, D63.1, Z99.2]  Not Applicable     Priority: Medium   • Unspecified diastolic (congestive) heart failure (HCC) [I50.30]  Yes     Priority: Low   • MIKE and COPD overlap syndrome ([facility]) [G47.33, J44.9]  Yes     Priority: Low   • Hypothyroidism [E03.9]  Yes     Priority: Low   • Coronary artery disease [I25.10]  Yes     Priority: Low   • COPD (chronic obstructive pulmonary disease) ([facility]) [J44.9]  Yes     Priority: Low   • Hypertension [I10]  Yes     Priority: Low      Resolved Hospital Problems    Diagnosis Date Resolved POA   • SIRS (systemic inflammatory response syndrome) ([facility]) [R65.10] 2022 Yes     Priority: High   • Acute cystitis with hematuria [N30.01] 01/15/2022 No     Priority: High   • Fall [W19.XXXA] 2022 No     Priority: High   • Type 2 diabetes mellitus with hyperosmolar hyperglycemic state (HHS) (HCC) [E11.00, E11.65] 2022 Yes     Priority: High   • Diabetic acidosis (HCC) [E11.10] 01/10/2022 Yes   • Altered mental status [R41.82] 2022 Yes   • CAD (coronary artery disease) [I25.10]  01/09/2022 Yes     DISCHARGE DIAGNOSES:   Active Hospital Problems    Diagnosis  POA   • **Physical deconditioning [R53.81]  Yes     Priority: High   • Type 2 diabetes mellitus with kidney complication, with long-term current use of insulin (HCC) [E11.29, Z79.4]  Not Applicable     Priority: High   • Personal history of noncompliance with medical treatment, presenting hazards to health [Z91.19]  Not Applicable     Priority: Medium   • Anemia due to chronic kidney disease, on chronic dialysis (HCC) [N18.6, D63.1, Z99.2]  Not Applicable     Priority: Medium   • Unspecified diastolic (congestive) heart failure (HCC) [I50.30]  Yes     Priority: Low   • MIKE and COPD overlap syndrome (HCC) [G47.33, J44.9]  Yes     Priority: Low   • Hypothyroidism [E03.9]  Yes     Priority: Low   • Coronary artery disease [I25.10]  Yes     Priority: Low   • COPD (chronic obstructive pulmonary disease) (HCC) [J44.9]  Yes     Priority: Low   • Hypertension [I10]  Yes     Priority: Low      Resolved Hospital Problems    Diagnosis Date Resolved POA   • SIRS (systemic inflammatory response syndrome) (HCC) [R65.10] 01/11/2022 Yes     Priority: High   • Acute cystitis with hematuria [N30.01] 01/15/2022 No     Priority: High   • Fall [W19.XXXA] 01/19/2022 No     Priority: High   • Type 2 diabetes mellitus with hyperosmolar hyperglycemic state (HHS) (HCC) [E11.00, E11.65] 01/11/2022 Yes     Priority: High   • Diabetic acidosis (HCC) [E11.10] 01/10/2022 Yes   • Altered mental status [R41.82] 01/11/2022 Yes   • CAD (coronary artery disease) [I25.10] 01/09/2022 Yes       SERVICE: Family Medicine Residency  Attending: Radha Argueta MD  Resident: Luz Quijano MD    CONSULTS:   Consult Orders (all) (From admission, onward)     Start     Ordered    01/13/22 1051  Inpatient Case Management  Consult  Once        Comments: She will need rehab at discharge and can go as early as today if accepted.   Provider:  (Not yet assigned)    01/13/22  1051    01/10/22 0829  Inpatient Cardiology Consult  Once        Comments: Spoke with Dr. Rios overnight (on-call Cardiologist)   Specialty:  Cardiology  Provider:  Margartio Davis MD    01/10/22 0829    01/09/22 2049  Inpatient Cardiology Consult  Once,   Status:  Canceled        Comments: Spoke with Dr. Rios overnight (on-call Cardiologist)   Specialty:  Cardiology  Provider:  Margarito Davis MD    01/09/22 2048 01/09/22 2000  Inpatient Cardiology Consult  Once,   Status:  Canceled        Specialty:  Cardiology  Provider:  Niall Rios MD    01/09/22 2000 01/09/22 1548  Inpatient Nephrology Consult  Once        Specialty:  Nephrology  Provider:  Ace Lauren MD    01/09/22 1548 01/09/22 1511  Inpatient Nutrition Consult  Once        Provider:  (Not yet assigned)    01/09/22 1511 01/09/22 1511  Inpatient Diabetes Educator Consult  Once        Provider:  (Not yet assigned)    01/09/22 1511    Signed and Held  Inpatient Nutrition Consult  Once,   Status:  Canceled        Provider:  (Not yet assigned)    Signed and Held    Signed and Held  Inpatient Diabetes Educator Consult  Once,   Status:  Canceled        Provider:  (Not yet assigned)    Signed and Held                PROCEDURES:   None    HISTORY OF PRESENT ILLNESS:   Taken from Dr Luz Quijano's H and P dated 1/9/2022 at 1549 hours  Yamileth Slater is a 61 y.o. female with a CMH of CKD stage 4 on dialsysi MWF, CAD s/p CABG x3  uncontrolled DM type 2, HFpEF, GERD, HTN, HLD, MIKE on CPAP who presents to the ED with AMS. Patient is accompanied with  who states last known normal was around midnight. She went to bed around 9pm after dinner. She woke up multiple times through the night with increased urinary frequency and wet the bed. Around 7 am this morning patient's  noticed patient was altered and brought her to ED. She is a mwf dialysis patient and completed her dialysis on Friday without  difficulty.     ED course: Serum glucose 800, 1 L NS, CT Head wnl, EKG, sinus tachycardia, picc line placed, Zosyn/Vancomycin started, Insulin Drip ordered.   DIAGNOSTIC DATA:   XR Shoulder 2+ View Right    Result Date: 1/19/2022  Negative shoulder. Electronically signed by:  Young Johnson MD  1/19/2022 10:22 AM CST Workstation: [id number]    CT Head Without Contrast    Result Date: 1/19/2022  1. Chronic changes with generalized cortical atrophy and evidence of chronic ischemic microvascular disease. 2. No radiographic evidence of acute intracranial pathology. 3. Findings as above indicative of mastoiditis on the left. Electronically signed by:  Hilda Orellana MD  1/19/2022 10:07 AM CST Workstation: [id number]    CT Head Without Contrast    Result Date: 1/13/2022  1. Stable appearance of the brain without radiographic evidence of acute intracranial pathology. Electronically signed by:  Hilda Orellana MD  1/13/2022 1:53 PM CST Workstation: [id number]    CT Head Without Contrast    Result Date: 1/9/2022  No acute intracranial process. Unchanged sequela of chronic ischemic microvascular disease and cerebral volume loss. Electronically signed by:  Kamar Khanna MD  1/9/2022 10:52 AM CST Workstation: [id number]    XR Chest 1 View    Result Date: 1/9/2022  Unchanged cardiomegaly with probable mild pulmonary edema. Electronically signed by:  Kamar Khanna MD  1/9/2022 11:12 AM CST Workstation: [id number]    XR Pelvis 1 or 2 View    Result Date: 1/13/2022  No gross displaced fracture evident on frontal radiographic image. If there is persistent clinical concern, would recommend repeat imaging or cross-sectional imaging for increased sensitivity. Electronically signed by:  Jm Mckeon MD  1/13/2022 6:41 PM CST Workstation: [id number]    XR Hip With or Without Pelvis 2 - 3 View Right    Result Date: 1/19/2022  No acute AP pelvis or right hip abnormality. Electronically signed by:  Young Johnson MD  1/19/2022 10:20  AM CST Workstation: [id number]    XR Chest Post CVA Port    Result Date: 1/9/2022  CONCLUSION: Right PICC line now in place with tip projected over the proximal superior vena cava. Right internal jugular multilumen catheter remains in place with tip projected over the proximal superior vena cava. Sternotomy. Cardiomegaly with minimal pulmonary vascular congestion and interstitial edema. Perhaps very small right pleural effusion. [id number] Electronically signed by:  Jon Patricia MD  1/9/2022 12:35 PM CST Workstation: [id number]    Lab Results (last 24 hours)     Procedure Component Value Units Date/Time    Blood Culture - Blood, Arm, Right [[id number]]  (Normal) Collected: 01/15/22 0725    Specimen: Blood from Arm, Right Updated: 01/20/22 0732     Blood Culture No growth at 5 days    Blood Culture - Blood, Arm, Left [[id number]]  (Normal) Collected: 01/15/22 0719    Specimen: Blood from Arm, Left Updated: 01/20/22 0732     Blood Culture No growth at 5 days    Basic Metabolic Panel [[id number]]  (Abnormal) Collected: 01/20/22 0622    Specimen: Blood Updated: 01/20/22 0658     Glucose 252 mg/dL      BUN 31 mg/dL      Creatinine 4.65 mg/dL      Sodium 127 mmol/L      Potassium 5.5 mmol/L      Chloride 93 mmol/L      CO2 26.0 mmol/L      Calcium 8.9 mg/dL      eGFR   Amer --     Comment: <15 Indicative of kidney failure.        eGFR Non African Amer 10 mL/min/1.73      Comment: <15 Indicative of kidney failure.        BUN/Creatinine Ratio 6.7     Anion Gap 8.0 mmol/L     Narrative:      GFR Normal >60  Chronic Kidney Disease <60  Kidney Failure <15      POC Glucose Once [[id number]]  (Abnormal) Collected: 01/20/22 0623    Specimen: Blood Updated: 01/20/22 0644     Glucose 242 mg/dL      Comment: RN NotifiedOperator: [id number] LUCY Ricci ID: [id number]       CBC (No Diff) [[id number]]  (Abnormal) Collected: 01/20/22 0622    Specimen: Blood Updated: 01/20/22 0636     WBC 7.68 10*3/mm3      RBC 2.81  10*6/mm3      Hemoglobin 7.8 g/dL      Hematocrit 24.5 %      MCV 87.2 fL      MCH 27.8 pg      MCHC 31.8 g/dL      RDW 14.3 %      RDW-SD 45.1 fl      MPV 8.5 fL      Platelets 332 10*3/mm3     POC Glucose Once [[id number]]  (Abnormal) Collected: 01/19/22 2326    Specimen: Blood Updated: 01/19/22 2353     Glucose 177 mg/dL      Comment: RN NotifiedOperator: [id number] AGENT CHRISTOPHERMeter ID: [id number]       POC Glucose Once [[id number]]  (Normal) Collected: 01/19/22 2106    Specimen: Blood Updated: 01/19/22 2121     Glucose 118 mg/dL      Comment: RN NotifiedOperator: [id number] AGENT CHRISTOPHERMeter ID: [id number]       POC Glucose Once [[id number]]  (Abnormal) Collected: 01/19/22 1640    Specimen: Blood Updated: 01/19/22 1652     Glucose 132 mg/dL      Comment: RN NotifiedOperator: [id number] YAAKOV RAYAMeter ID: [id number]              HOSPITAL COURSE:  Patient was admitted for altered mental status, HHS, and chronic renal failure requiring hemodialysis.  She was started on an insulin drip for several hours and was transitioned to long-acting and short acting insulin.  She had several CT head scans which were negative for acute findings.  She was unable to fit in the MRI scanner for MRI of the brain due to body habitus.  Patient received hemodialysis on her home schedule of Monday Wednesday Friday.  Patient slowly returned to baseline and was working well with physical therapy.  Attempt to place patient in rehab facility was unsuccessful secondary to insurance coverage and availability of beds.  Patient was ambulating independently around the room on day of discharge and will be going home with home health nursing and home PT for continued therapy.  Patient had a controlled descent to the floor on hospital day 5 when she tried to get out of bed by herself without her walker.  Patient had no injury but CT of the head was done which was negative for acute findings.  Patient had pain over her tailbone from  when she let her self down to the floor while hanging onto the computer station.  Lidocaine patch and Bengay was used to help alleviate her pain.  Patient had a second fall on day 10 without any injury. She complained of headache, nose bleed, right shoulder and right hip pain. CT head, shoulder xray and pelvis with hip xray were ordered which resulted with normal findings and no acute pathology. Neuro exam was unremarkable with no noted deficits.   Over the course of her hospital stay, her Levemir dosing was adjusted to 30 units in the morning and 35 units at night.  Patient was given sliding scale as well as 30 units novolog 3 times a day with meals.  Patient's predinner NovoLog dosing was held several times by nursing staff which made it difficult for us to fully appreciate the effects of the Levemir nighttime dosing.  Patient will be fitted with freestyle payam CGM device prior to discharge with close follow-up in clinic over the next couple of weeks for adjustment of insulin dosing.  DISCHARGE CONDITION:   Stable    DISPOSITION:  Home-Health Care [facility]    DISCHARGE MEDICATIONS     Discharge Medications      New Medications      Instructions Start Date   Levemir FlexTouch 100 UNIT/ML injection  Generic drug: insulin detemir  Replaces: insulin detemir 100 UNIT/ML injection   Inject 30 Units under the skin into the appropriate area as directed Every morning and 35 units every night.      muscle rub 10-15 % cream cream   Apply 1 application topically to the appropriate area as directed 4 (Four) Times a Day As Needed for buttock pain.         Changes to Medications      Instructions Start Date   bumetanide 2 MG tablet  Commonly known as: BUMEX  What changed: Another medication with the same name was removed. Continue taking this medication, and follow the directions you see here.  Notes to patient: As directed   2 mg, Oral, 4 Times Weekly      insulin aspart 100 UNIT/ML solution pen-injector sc pen  Commonly known as:  novoLOG FLEXPEN  What changed: Another medication with the same name was added. Make sure you understand how and when to take each.   30 Units, Subcutaneous, 3 Times Daily With Meals      NovoLOG FlexPen 100 UNIT/ML solution pen-injector sc pen  Generic drug: insulin aspart  What changed: You were already taking a medication with the same name, and this prescription was added. Make sure you understand how and when to take each.   Inject 30 units in addition to sliding scale on sheet of paper (0-24 Units) under the skin as directed 3 (three) times daily with meals.      metoprolol tartrate 25 MG tablet  Commonly known as: LOPRESSOR  What changed:   · medication strength  · See the new instructions.   Take 1 tablet by mouth Every 12 (Twelve) Hours.         Continue These Medications      Instructions Start Date   acetaminophen 650 MG 8 hr tablet  Commonly known as: Tylenol 8 Hour   650 mg, Oral, Every 8 Hours PRN      Adult Aspirin Regimen 81 MG EC tablet  Generic drug: aspirin   81 mg, Oral, Daily      albuterol sulfate  (90 Base) MCG/ACT inhaler  Commonly known as: PROVENTIL HFA;VENTOLIN HFA;PROAIR HFA   2 puffs, Inhalation, Every 4 Hours PRN      albuterol (2.5 MG/3ML) 0.083% nebulizer solution  Commonly known as: PROVENTIL   Inhale the contents of 1 vial by nebulization Every 4 (Four) Hours As Needed for Wheezing.      atorvastatin 20 MG tablet  Commonly known as: LIPITOR   TAKE ONE TABLET BY MOUTH EVERY NIGHT AT BEDTIME      cetirizine 10 MG tablet  Commonly known as: zyrTEC   10 mg, Oral, Daily      clopidogrel 75 MG tablet  Commonly known as: PLAVIX   75 mg, Oral, Daily      [employer] Blood Glucose Meter w/Device kit   1 each, Does not apply, 3 Times Daily      Easy Touch Pen Needles 31G X 8 MM misc  Generic drug: Insulin Pen Needle  Notes to patient: As directed   No dose, route, or frequency recorded.      NovoFine 30G X 8 MM misc  Generic drug: Insulin Pen Needle  Notes to patient: As directed   As directed  4 times daily      TRUEplus Pen Needles 31G X 8 MM misc  Generic drug: Insulin Pen Needle   Use to inject insulin 4 (Four) Times a Day as directed.      epoetin hubert-epbx 10449 UNIT/ML injection  Commonly known as: RETACRIT   10,000 Units, Subcutaneous, 3 Times Weekly      ferrous sulfate 325 (65 FE) MG tablet  Commonly known as: FeroSul   325 mg, Oral, Daily With Breakfast      fluticasone 50 MCG/ACT nasal spray  Commonly known as: FLONASE   2 sprays, Each Nare, Daily      FreeStyle Jade 2 [place] device  Notes to patient: As directed   1 each, Does not apply, Continuous      FreeStyle Jade 2 Sensor misc   1 each, Does not apply, Every 14 Days      gabapentin 800 MG tablet  Commonly known as: NEURONTIN   800 mg, Oral, 3 Times Daily      glucose blood test strip  Notes to patient: As directed   Use as instructed      ipratropium-albuterol 0.5-2.5 mg/3 ml nebulizer  Commonly known as: DUO-NEB   3 mL, Nebulization, 4 Times Daily - RT      isosorbide mononitrate 30 MG 24 hr tablet  Commonly known as: IMDUR   30 mg, Oral, Every Morning      levothyroxine 25 MCG tablet  Commonly known as: SYNTHROID, LEVOTHROID   25 mcg, Oral, Daily      lidocaine 5 %  Commonly known as: LIDODERM   APPLY TO THE AFFECTED AREA(S) TOPICALLY TWO TIMES A DAY. REMOVE NIGHTLY      lisinopril 5 MG tablet  Commonly known as: PRINIVIL,ZESTRIL   5 mg, Oral, Daily      montelukast 10 MG tablet  Commonly known as: SINGULAIR   10 mg, Oral, Nightly      nitroglycerin 0.4 MG SL tablet  Commonly known as: NITROSTAT   0.4 mg, Sublingual, Every 5 Minutes PRN      nystatin 319067 UNIT/GM powder  Commonly known as: MYCOSTATIN   Topical, 3 Times Daily      O2  Commonly known as: OXYGEN  Notes to patient: As directed   3 L/min, Inhalation, Continuous      ondansetron ODT 4 MG disintegrating tablet  Commonly known as: Zofran ODT   4 mg, Translingual, Every 8 Hours PRN      oxybutynin XL 5 MG 24 hr tablet  Commonly known as: DITROPAN-XL   5 mg, Oral, Daily       pantoprazole 40 MG EC tablet  Commonly known as: PROTONIX   40 mg, Oral, Nightly      promethazine 25 MG tablet  Commonly known as: PHENERGAN   25 mg, Oral, Every 6 Hours PRN      ranolazine 500 MG 12 hr tablet  Commonly known as: RANEXA   500 mg, Oral, Every 12 Hours Scheduled      sennosides-docusate 8.6-50 MG per tablet  Commonly known as: PERICOLACE   2 tablets, Oral, 2 Times Daily         Stop These Medications    cyclobenzaprine 10 MG tablet  Commonly known as: FLEXERIL     insulin detemir 100 UNIT/ML injection  Commonly known as: LEVEMIR  Replaced by: Levemir FlexTouch 100 UNIT/ML injection     potassium chloride 10 MEQ CR tablet            INSTRUCTIONS:  Activity:   Activity Instructions     Activity as Tolerated      Activity as Tolerated          Diet:   Diet Instructions     Diet: Consistent Carbohydrate, Cardiac, Renal      Discharge Diet:  Consistent Carbohydrate  Cardiac  Renal       32 ounces fluid restriction    Diet: Consistent Carbohydrate, Renal      Discharge Diet:  Consistent Carbohydrate  Renal       Fluid Restriction per day: Other (See comments)    Fluid restriction 1200 mls/day          FOLLOW UP:   Additional Instructions for the Follow-ups that You Need to Schedule     Ambulatory Referral to Home Health   As directed      Face to Face Visit Date: 1/19/2022    Follow-up provider for Plan of Care?: I treated the patient in an acute care facility and will not continue treatment after discharge.    Follow-up provider: MAITE QUINN [[id number]]    Reason/Clinical Findings: deconditioning    Describe mobility limitations that make leaving home difficult: deconditioning, dialysis    Nursing/Therapeutic Services Requested: Skilled Nursing Physical Therapy    Skilled nursing orders: Medication education Neurovascular assessments    PT orders: Therapeutic exercise Gait Training Home safety assessment    Weight Bearing Status: As Tolerated    Frequency: 1 Week 1         Ambulatory Referral to Home  Health   As directed      Face to Face Visit Date: 1/20/2022    Follow-up provider for Plan of Care?: I treated the patient in an acute care facility and will not continue treatment after discharge.    Follow-up provider: MAITE QUINN [[id number]]    Reason/Clinical Findings: deconditioning    Describe mobility limitations that make leaving home difficult: deconditioing    Nursing/Therapeutic Services Requested: Physical Therapy Occupational Therapy Skilled Nursing    Skilled nursing orders: Medication education (CGM sensor and reader) Other    PT orders: Strengthening Total joint pathway    Occupational orders: Activities of daily living Strengthening    Frequency: 1 Week 1         Discharge Follow-up with PCP   As directed       Currently Documented PCP:    Maite Quinn MD    PCP Phone Number:    [phone]     Follow Up Details: in one week         Discharge Follow-up with PCP   As directed       Currently Documented PCP:    Maite Quinn MD    PCP Phone Number:    [phone]     Follow Up Details: one week            Contact information for follow-up providers     Maite Quinn MD Follow up.    Specialty: Family Medicine  Why: 01/26/2022  Wed. at 10:30am  Contact information:  [address] [place] [address] [phone]                   Contact information for after-discharge care     Dialysis/Infusion     [facility] .    Service: Dialysis  Contact information:  [address] [place] [id number]  [phone]                 Home Medical Care     [facility] .    Service: Home Health Services  Contact information:  [address] [facility] [address] [phone]                             PENDING TEST RESULTS AT DISCHARGE      Time: >30 minutes were spent in discharge planning, medication reconciliation and coordination of care for this patient.    Alex Wells MD is the attending at time of discharge, He is aware of the  patient's status and agrees with the above discharge summary.        This document has been electronically signed by Luz Quijano MD on January 20, 2022 14:44 CST            Electronically signed by Luz Quijano MD at 01/20/22 1444       Discharge Order (From admission, onward)     Start     Ordered    01/20/22 1000  Discharge patient  Once        Expected Discharge Date: 01/20/22    Discharge Disposition: Home-Health Care [facility]    Physician of Record for Attribution - Please select from Treatment Team: RAFAT SALCEDO [[id number]]    Review needed by CMO to determine Physician of Record: No       Question Answer Comment   Physician of Record for Attribution - Please select from Treatment Team RAFAT SALCEDO    Review needed by CMO to determine Physician of Record No        01/20/22 1020

## 2022-01-21 NOTE — OUTREACH NOTE
Call Center TCM Note      Responses   [facility] patient discharged from? [place]   Does the patient have one of the following disease processes/diagnoses(primary or secondary)? Other   TCM attempt successful? No   Unsuccessful attempts Attempt 1  [no release]          Noah Green RN    1/21/2022, 11:02 EST

## 2022-01-22 ENCOUNTER — HOME CARE VISIT (OUTPATIENT)
Dept: HOME HEALTH SERVICES | Facility: CLINIC | Age: 62
End: 2022-01-22

## 2022-01-22 VITALS
WEIGHT: 282 LBS | TEMPERATURE: 96.9 F | RESPIRATION RATE: 20 BRPM | BODY MASS INDEX: 45.54 KG/M2 | HEART RATE: 79 BPM | OXYGEN SATURATION: 100 % | DIASTOLIC BLOOD PRESSURE: 68 MMHG | SYSTOLIC BLOOD PRESSURE: 138 MMHG

## 2022-01-22 PROCEDURE — G0299 HHS/HOSPICE OF RN EA 15 MIN: HCPCS

## 2022-01-24 ENCOUNTER — TRANSITIONAL CARE MANAGEMENT TELEPHONE ENCOUNTER (OUTPATIENT)
Dept: CALL CENTER | Facility: HOSPITAL | Age: 62
End: 2022-01-24

## 2022-01-24 DIAGNOSIS — IMO0002 UNCONTROLLED TYPE 2 DIABETES MELLITUS WITH DIABETIC POLYNEUROPATHY, WITH LONG-TERM CURRENT USE OF INSULIN: ICD-10-CM

## 2022-01-24 RX ORDER — GABAPENTIN 800 MG/1
800 TABLET ORAL 3 TIMES DAILY
Qty: 90 TABLET | Refills: 0 | Status: CANCELLED | OUTPATIENT
Start: 2022-01-24

## 2022-01-24 NOTE — OUTREACH NOTE
Call Center TCM Note      Responses   [facility] patient discharged from? [place]   Does the patient have one of the following disease processes/diagnoses(primary or secondary)? Other   TCM attempt successful? No   Unsuccessful attempts Attempt 3          Roma Lopez RN    1/24/2022, 11:31 EST

## 2022-01-25 ENCOUNTER — HOME CARE VISIT (OUTPATIENT)
Dept: HOME HEALTH SERVICES | Facility: CLINIC | Age: 62
End: 2022-01-25

## 2022-01-25 ENCOUNTER — TELEPHONE (OUTPATIENT)
Dept: FAMILY MEDICINE CLINIC | Facility: CLINIC | Age: 62
End: 2022-01-25

## 2022-01-25 VITALS
OXYGEN SATURATION: 99 % | HEART RATE: 70 BPM | DIASTOLIC BLOOD PRESSURE: 74 MMHG | SYSTOLIC BLOOD PRESSURE: 134 MMHG | RESPIRATION RATE: 18 BRPM | TEMPERATURE: 97.5 F

## 2022-01-25 VITALS
OXYGEN SATURATION: 99 % | RESPIRATION RATE: 20 BRPM | DIASTOLIC BLOOD PRESSURE: 76 MMHG | SYSTOLIC BLOOD PRESSURE: 134 MMHG | HEART RATE: 78 BPM

## 2022-01-25 PROCEDURE — G0152 HHCP-SERV OF OT,EA 15 MIN: HCPCS

## 2022-01-25 PROCEDURE — G0151 HHCP-SERV OF PT,EA 15 MIN: HCPCS

## 2022-01-25 NOTE — CASE COMMUNICATION
Please schedule no visits on MWF (dialysis) and PT/OT on opposite Tues/Thurs.  except next week patient has appt. 2/1/22 and requests both disciplines to come on Thursday 2/3/22.  Thank you.

## 2022-01-25 NOTE — HOME HEALTH
DIALYSIS  MON, WED, FRIDAY     REASON FOR REFERRAL: 60 y/o female with several  hospitalizations over the past 4-5 months: [facility] 10/11/21-10/28/21 for GI bleed and s/p transfusion. She transferred to [facility] 10/28/21-11/8/21 for rehab services then transferred back to [facility] 11/8/21-11/15/21 with complaints of chest pain and was treated for penumonia, acute on chronic CHF, and s/p cardiac stenting. She then was readmitted to [facility] 12/12/21 with complaints of no electricity for O2/trilogy and was treated for hyponamtremia, chronic respiratory failure with hypoxia.  [facility] 1/9/22-1/20/22 after falling and treated for SIRS, diabetic acidosis, acute cystitis with hematuria, and AMS.  Patient reported that she is weak and wants OT to work on regaining her strength.    PMH: CAD, DM, CKD, CHF, COPD, HTN, HLD, GERD, PVD, anemia, chronic respiratory failure-3L per NC, anxiety, neuropathy, substance abuse, carotid stenting, CABG x3.     PLOF: PRN assistance for ADLs from spouse, independent with functional mobility without AD inside apartment, and SB(A) for mobility using rollator walker outside of home.     HOME ENVIRONMENT: Patient lives in one level apartment with spouse with no steps to enter. PRECAUTIONS: monitor O2 with activity     MD APPTS: 2/1/22 Dr Goldsmith, 2/16/22 Dr Davis    DME: Oxygen equipment, rollator walker, shower chair in tub/shower combo with curtain, hand held shower, handicapped height toilet, grab bar in shower, trilogy     AROM B UES: WFL STRENGTH B UES: 4/5 grossly throughout.     HAND DOMINANCE: R hand dominant, B  strengths: 4-/5. Patient with complaints of decreased sensation B Hands at B fingers.     REHAB POTENTIAL: Good for Goals    WISH TO ADDRESS BATH/DRESSING WITH OT: NO     MEDICAL NECISSITY:  Patient presents with weakness and limited activity tolerance after recent hospitalizations for hyponatremia, diabetic acidosis, SIRS, UTI, and cardiac stenting.  She requires  skilled OT services to work on functional transfer independence, B UE strength, standing tolerance for ADLs, activity tolerance, and review for B UE HEP.    PATIENT'S GOAL: GET MY STRENGTH BACK

## 2022-01-25 NOTE — TELEPHONE ENCOUNTER
Incoming Refill Request      Medication requested (name and dose): gabapentin (NEURONTIN) 800 MG tablet    Pharmacy where request should be sent: [place] [facility]    Additional details provided by patient:     Best call back number: [phone]    Does the patient have less than a 3 day supply:  [x] Yes  [] No    Elaina Griffin Workman  01/25/22, 09:51 CST

## 2022-01-25 NOTE — CASE COMMUNICATION
[facility]  / Case Conference Note  Medical Necessity and focus of care:  Patient presents with weakness and limited activity tolerance after recent hospitalizations for hyponatremia, diabetic acidosis, SIRS, UTI, and cardiac stenting.  She requires skilled OT services to work on functional transfer independence, B UE strength, standing tolerance for ADLs, activity tolerance, and review for B UE HEP.  Caregiver Status: spouse  Patient's goa l(s): Get strength back   Environmental/ Physical issues: No problems noted  Any additional needs: None at this time    Based upon record review and collaboration conference, the recommended frequency for this patient is   SN-----  PT-----  OT---- 1 week 3  ST-----  HHA---  MSW---  Provider__________ Notified @ _________ and agrees with POC     Please verify your eval  scores: (Answer the scores  that pertain to your area of focus remov e the others)   : 5  : 2  : 3

## 2022-01-26 ENCOUNTER — APPOINTMENT (OUTPATIENT)
Dept: GENERAL RADIOLOGY | Facility: HOSPITAL | Age: 62
End: 2022-01-26

## 2022-01-26 ENCOUNTER — HOSPITAL ENCOUNTER (OUTPATIENT)
Facility: HOSPITAL | Age: 62
LOS: 1 days | Discharge: HOME HEALTH | End: 2022-01-29
Attending: EMERGENCY MEDICINE | Admitting: FAMILY MEDICINE

## 2022-01-26 DIAGNOSIS — D64.9 SYMPTOMATIC ANEMIA: ICD-10-CM

## 2022-01-26 DIAGNOSIS — N17.9 ACUTE RENAL FAILURE SUPERIMPOSED ON CHRONIC KIDNEY DISEASE, UNSPECIFIED CKD STAGE, UNSPECIFIED ACUTE RENAL FAILURE TYPE: ICD-10-CM

## 2022-01-26 DIAGNOSIS — D50.0 IRON DEFICIENCY ANEMIA DUE TO CHRONIC BLOOD LOSS: ICD-10-CM

## 2022-01-26 DIAGNOSIS — E11.49 NEUROLOGIC DISORDER ASSOCIATED WITH DIABETES MELLITUS: ICD-10-CM

## 2022-01-26 DIAGNOSIS — Z78.9 IMPAIRED MOBILITY AND ADLS: ICD-10-CM

## 2022-01-26 DIAGNOSIS — R07.9 CHEST PAIN, UNSPECIFIED TYPE: Primary | ICD-10-CM

## 2022-01-26 DIAGNOSIS — N18.5 CKD (CHRONIC KIDNEY DISEASE), SYMPTOM MANAGEMENT ONLY, STAGE 5: ICD-10-CM

## 2022-01-26 DIAGNOSIS — Z74.09 IMPAIRED MOBILITY AND ADLS: ICD-10-CM

## 2022-01-26 DIAGNOSIS — R53.81 PHYSICAL DECONDITIONING: ICD-10-CM

## 2022-01-26 DIAGNOSIS — N18.9 ACUTE RENAL FAILURE SUPERIMPOSED ON CHRONIC KIDNEY DISEASE, UNSPECIFIED CKD STAGE, UNSPECIFIED ACUTE RENAL FAILURE TYPE: ICD-10-CM

## 2022-01-26 DIAGNOSIS — E87.70 HYPERVOLEMIA, UNSPECIFIED HYPERVOLEMIA TYPE: ICD-10-CM

## 2022-01-26 DIAGNOSIS — Z74.09 IMPAIRED FUNCTIONAL MOBILITY, BALANCE, GAIT, AND ENDURANCE: ICD-10-CM

## 2022-01-26 LAB
ABO GROUP BLD: NORMAL
ALBUMIN SERPL-MCNC: 3.3 G/DL (ref 3.5–5.2)
ALBUMIN/GLOB SERPL: 0.8 G/DL
ALP SERPL-CCNC: 150 U/L (ref 39–117)
ALT SERPL W P-5'-P-CCNC: 8 U/L (ref 1–33)
ANION GAP SERPL CALCULATED.3IONS-SCNC: 15 MMOL/L (ref 5–15)
AST SERPL-CCNC: 10 U/L (ref 1–32)
BASOPHILS # BLD AUTO: 0.07 10*3/MM3 (ref 0–0.2)
BASOPHILS NFR BLD AUTO: 0.8 % (ref 0–1.5)
BILIRUB SERPL-MCNC: 0.2 MG/DL (ref 0–1.2)
BLD GP AB SCN SERPL QL: NEGATIVE
BUN SERPL-MCNC: 54 MG/DL (ref 8–23)
BUN/CREAT SERPL: 12.1 (ref 7–25)
CALCIUM SPEC-SCNC: 8.3 MG/DL (ref 8.6–10.5)
CHLORIDE SERPL-SCNC: 94 MMOL/L (ref 98–107)
CO2 SERPL-SCNC: 24 MMOL/L (ref 22–29)
CREAT SERPL-MCNC: 4.46 MG/DL (ref 0.57–1)
DEPRECATED RDW RBC AUTO: 48 FL (ref 37–54)
EOSINOPHIL # BLD AUTO: 0.25 10*3/MM3 (ref 0–0.4)
EOSINOPHIL NFR BLD AUTO: 2.8 % (ref 0.3–6.2)
ERYTHROCYTE [DISTWIDTH] IN BLOOD BY AUTOMATED COUNT: 15 % (ref 12.3–15.4)
FLUAV SUBTYP SPEC NAA+PROBE: NOT DETECTED
FLUBV RNA ISLT QL NAA+PROBE: NOT DETECTED
GFR SERPL CREATININE-BSD FRML MDRD: 10 ML/MIN/1.73
GFR SERPL CREATININE-BSD FRML MDRD: ABNORMAL ML/MIN/{1.73_M2}
GLOBULIN UR ELPH-MCNC: 3.9 GM/DL
GLUCOSE BLDC GLUCOMTR-MCNC: 139 MG/DL (ref 70–130)
GLUCOSE SERPL-MCNC: 314 MG/DL (ref 65–99)
HCT VFR BLD AUTO: 20 % (ref 34–46.6)
HCT VFR BLD AUTO: 27.7 % (ref 34–46.6)
HGB BLD-MCNC: 6.3 G/DL (ref 12–15.9)
HGB BLD-MCNC: 9 G/DL (ref 12–15.9)
HOLD SPECIMEN: NORMAL
HOLD SPECIMEN: NORMAL
IMM GRANULOCYTES # BLD AUTO: 0.1 10*3/MM3 (ref 0–0.05)
IMM GRANULOCYTES NFR BLD AUTO: 1.1 % (ref 0–0.5)
LYMPHOCYTES # BLD AUTO: 1.91 10*3/MM3 (ref 0.7–3.1)
LYMPHOCYTES NFR BLD AUTO: 21.1 % (ref 19.6–45.3)
Lab: NORMAL
MCH RBC QN AUTO: 28.3 PG (ref 26.6–33)
MCHC RBC AUTO-ENTMCNC: 31.5 G/DL (ref 31.5–35.7)
MCV RBC AUTO: 89.7 FL (ref 79–97)
MONOCYTES # BLD AUTO: 0.36 10*3/MM3 (ref 0.1–0.9)
MONOCYTES NFR BLD AUTO: 4 % (ref 5–12)
NEUTROPHILS NFR BLD AUTO: 6.36 10*3/MM3 (ref 1.7–7)
NEUTROPHILS NFR BLD AUTO: 70.2 % (ref 42.7–76)
NRBC BLD AUTO-RTO: 0 /100 WBC (ref 0–0.2)
NT-PROBNP SERPL-MCNC: 5242 PG/ML (ref 0–900)
PLATELET # BLD AUTO: 246 10*3/MM3 (ref 140–450)
PMV BLD AUTO: 9.1 FL (ref 6–12)
POTASSIUM SERPL-SCNC: 4.3 MMOL/L (ref 3.5–5.2)
PROT SERPL-MCNC: 7.2 G/DL (ref 6–8.5)
RBC # BLD AUTO: 2.23 10*6/MM3 (ref 3.77–5.28)
RH BLD: POSITIVE
SARS-COV-2 RNA PNL SPEC NAA+PROBE: NOT DETECTED
SODIUM SERPL-SCNC: 133 MMOL/L (ref 136–145)
T&S EXPIRATION DATE: NORMAL
TROPONIN T SERPL-MCNC: 0.07 NG/ML (ref 0–0.03)
TROPONIN T SERPL-MCNC: 0.07 NG/ML (ref 0–0.03)
TROPONIN T SERPL-MCNC: 0.08 NG/ML (ref 0–0.03)
TROPONIN T SERPL-MCNC: 0.11 NG/ML (ref 0–0.03)
WBC NRBC COR # BLD: 9.05 10*3/MM3 (ref 3.4–10.8)
WHOLE BLOOD HOLD SPECIMEN: NORMAL

## 2022-01-26 PROCEDURE — G0378 HOSPITAL OBSERVATION PER HR: HCPCS

## 2022-01-26 PROCEDURE — 84484 ASSAY OF TROPONIN QUANT: CPT | Performed by: STUDENT IN AN ORGANIZED HEALTH CARE EDUCATION/TRAINING PROGRAM

## 2022-01-26 PROCEDURE — 85014 HEMATOCRIT: CPT | Performed by: STUDENT IN AN ORGANIZED HEALTH CARE EDUCATION/TRAINING PROGRAM

## 2022-01-26 PROCEDURE — 85025 COMPLETE CBC W/AUTO DIFF WBC: CPT | Performed by: EMERGENCY MEDICINE

## 2022-01-26 PROCEDURE — 93005 ELECTROCARDIOGRAM TRACING: CPT | Performed by: EMERGENCY MEDICINE

## 2022-01-26 PROCEDURE — 63710000001 INSULIN DETEMIR PER 5 UNITS: Performed by: STUDENT IN AN ORGANIZED HEALTH CARE EDUCATION/TRAINING PROGRAM

## 2022-01-26 PROCEDURE — 25010000002 HEPARIN (PORCINE) PER 1000 UNITS: Performed by: INTERNAL MEDICINE

## 2022-01-26 PROCEDURE — 86923 COMPATIBILITY TEST ELECTRIC: CPT

## 2022-01-26 PROCEDURE — 93010 ELECTROCARDIOGRAM REPORT: CPT | Performed by: INTERNAL MEDICINE

## 2022-01-26 PROCEDURE — G0257 UNSCHED DIALYSIS ESRD PT HOS: HCPCS

## 2022-01-26 PROCEDURE — 87636 SARSCOV2 & INF A&B AMP PRB: CPT | Performed by: EMERGENCY MEDICINE

## 2022-01-26 PROCEDURE — 82962 GLUCOSE BLOOD TEST: CPT

## 2022-01-26 PROCEDURE — 85018 HEMOGLOBIN: CPT | Performed by: STUDENT IN AN ORGANIZED HEALTH CARE EDUCATION/TRAINING PROGRAM

## 2022-01-26 PROCEDURE — 94799 UNLISTED PULMONARY SVC/PX: CPT

## 2022-01-26 PROCEDURE — 94760 N-INVAS EAR/PLS OXIMETRY 1: CPT

## 2022-01-26 PROCEDURE — C9803 HOPD COVID-19 SPEC COLLECT: HCPCS

## 2022-01-26 PROCEDURE — 94640 AIRWAY INHALATION TREATMENT: CPT

## 2022-01-26 PROCEDURE — 83880 ASSAY OF NATRIURETIC PEPTIDE: CPT | Performed by: EMERGENCY MEDICINE

## 2022-01-26 PROCEDURE — 86900 BLOOD TYPING SEROLOGIC ABO: CPT | Performed by: STUDENT IN AN ORGANIZED HEALTH CARE EDUCATION/TRAINING PROGRAM

## 2022-01-26 PROCEDURE — 84484 ASSAY OF TROPONIN QUANT: CPT | Performed by: EMERGENCY MEDICINE

## 2022-01-26 PROCEDURE — 80053 COMPREHEN METABOLIC PANEL: CPT | Performed by: EMERGENCY MEDICINE

## 2022-01-26 PROCEDURE — P9016 RBC LEUKOCYTES REDUCED: HCPCS

## 2022-01-26 PROCEDURE — 99285 EMERGENCY DEPT VISIT HI MDM: CPT

## 2022-01-26 PROCEDURE — 36415 COLL VENOUS BLD VENIPUNCTURE: CPT | Performed by: STUDENT IN AN ORGANIZED HEALTH CARE EDUCATION/TRAINING PROGRAM

## 2022-01-26 PROCEDURE — 86901 BLOOD TYPING SEROLOGIC RH(D): CPT | Performed by: STUDENT IN AN ORGANIZED HEALTH CARE EDUCATION/TRAINING PROGRAM

## 2022-01-26 PROCEDURE — 86850 RBC ANTIBODY SCREEN: CPT | Performed by: STUDENT IN AN ORGANIZED HEALTH CARE EDUCATION/TRAINING PROGRAM

## 2022-01-26 PROCEDURE — 71045 X-RAY EXAM CHEST 1 VIEW: CPT

## 2022-01-26 PROCEDURE — 86900 BLOOD TYPING SEROLOGIC ABO: CPT

## 2022-01-26 PROCEDURE — 99218 PR INITIAL OBSERVATION CARE/DAY 30 MINUTES: CPT | Performed by: STUDENT IN AN ORGANIZED HEALTH CARE EDUCATION/TRAINING PROGRAM

## 2022-01-26 RX ORDER — NITROGLYCERIN 0.4 MG/1
0.4 TABLET SUBLINGUAL
Status: DISCONTINUED | OUTPATIENT
Start: 2022-01-26 | End: 2022-01-29 | Stop reason: HOSPADM

## 2022-01-26 RX ORDER — LEVOTHYROXINE SODIUM 0.03 MG/1
25 TABLET ORAL
Status: DISCONTINUED | OUTPATIENT
Start: 2022-01-27 | End: 2022-01-29 | Stop reason: HOSPADM

## 2022-01-26 RX ORDER — ISOSORBIDE MONONITRATE 30 MG/1
30 TABLET, EXTENDED RELEASE ORAL EVERY MORNING
Status: DISCONTINUED | OUTPATIENT
Start: 2022-01-27 | End: 2022-01-29 | Stop reason: HOSPADM

## 2022-01-26 RX ORDER — ACETAMINOPHEN 325 MG/1
650 TABLET ORAL EVERY 4 HOURS PRN
Status: DISCONTINUED | OUTPATIENT
Start: 2022-01-26 | End: 2022-01-29 | Stop reason: HOSPADM

## 2022-01-26 RX ORDER — RANOLAZINE 500 MG/1
500 TABLET, EXTENDED RELEASE ORAL EVERY 12 HOURS SCHEDULED
Status: DISCONTINUED | OUTPATIENT
Start: 2022-01-26 | End: 2022-01-29 | Stop reason: HOSPADM

## 2022-01-26 RX ORDER — ATORVASTATIN CALCIUM 20 MG/1
20 TABLET, FILM COATED ORAL DAILY
Status: DISCONTINUED | OUTPATIENT
Start: 2022-01-26 | End: 2022-01-29 | Stop reason: HOSPADM

## 2022-01-26 RX ORDER — ASPIRIN 81 MG/1
81 TABLET ORAL DAILY
Status: DISCONTINUED | OUTPATIENT
Start: 2022-01-26 | End: 2022-01-26

## 2022-01-26 RX ORDER — INSULIN GLARGINE 100 [IU]/ML
INJECTION, SOLUTION SUBCUTANEOUS
Qty: 30 ML | Refills: 1 | OUTPATIENT
Start: 2022-01-26

## 2022-01-26 RX ORDER — ALBUTEROL SULFATE 2.5 MG/3ML
2.5 SOLUTION RESPIRATORY (INHALATION) EVERY 4 HOURS PRN
Status: DISCONTINUED | OUTPATIENT
Start: 2022-01-26 | End: 2022-01-29 | Stop reason: HOSPADM

## 2022-01-26 RX ORDER — GABAPENTIN 400 MG/1
800 CAPSULE ORAL EVERY 8 HOURS SCHEDULED
Status: DISCONTINUED | OUTPATIENT
Start: 2022-01-26 | End: 2022-01-29 | Stop reason: HOSPADM

## 2022-01-26 RX ORDER — OXYBUTYNIN CHLORIDE 5 MG/1
5 TABLET, EXTENDED RELEASE ORAL DAILY
Status: DISCONTINUED | OUTPATIENT
Start: 2022-01-27 | End: 2022-01-29 | Stop reason: HOSPADM

## 2022-01-26 RX ORDER — AMOXICILLIN 250 MG
2 CAPSULE ORAL 2 TIMES DAILY
Status: DISCONTINUED | OUTPATIENT
Start: 2022-01-26 | End: 2022-01-29 | Stop reason: HOSPADM

## 2022-01-26 RX ORDER — NYSTATIN 100000 [USP'U]/G
POWDER TOPICAL 3 TIMES DAILY
Status: DISCONTINUED | OUTPATIENT
Start: 2022-01-26 | End: 2022-01-29 | Stop reason: HOSPADM

## 2022-01-26 RX ORDER — DEXTROSE MONOHYDRATE 25 G/50ML
25 INJECTION, SOLUTION INTRAVENOUS
Status: DISCONTINUED | OUTPATIENT
Start: 2022-01-26 | End: 2022-01-29 | Stop reason: HOSPADM

## 2022-01-26 RX ORDER — BISACODYL 10 MG
10 SUPPOSITORY, RECTAL RECTAL DAILY PRN
Status: DISCONTINUED | OUTPATIENT
Start: 2022-01-26 | End: 2022-01-29 | Stop reason: HOSPADM

## 2022-01-26 RX ORDER — SODIUM CHLORIDE 0.9 % (FLUSH) 0.9 %
10 SYRINGE (ML) INJECTION EVERY 12 HOURS SCHEDULED
Status: DISCONTINUED | OUTPATIENT
Start: 2022-01-26 | End: 2022-01-29 | Stop reason: HOSPADM

## 2022-01-26 RX ORDER — ACETAMINOPHEN 160 MG/5ML
650 SOLUTION ORAL EVERY 4 HOURS PRN
Status: DISCONTINUED | OUTPATIENT
Start: 2022-01-26 | End: 2022-01-29 | Stop reason: HOSPADM

## 2022-01-26 RX ORDER — ALBUTEROL SULFATE 90 UG/1
2 AEROSOL, METERED RESPIRATORY (INHALATION) EVERY 4 HOURS PRN
Status: DISCONTINUED | OUTPATIENT
Start: 2022-01-26 | End: 2022-01-26 | Stop reason: SDUPTHER

## 2022-01-26 RX ORDER — CLOPIDOGREL BISULFATE 75 MG/1
75 TABLET ORAL DAILY
Status: DISCONTINUED | OUTPATIENT
Start: 2022-01-26 | End: 2022-01-28

## 2022-01-26 RX ORDER — IPRATROPIUM BROMIDE AND ALBUTEROL SULFATE 2.5; .5 MG/3ML; MG/3ML
3 SOLUTION RESPIRATORY (INHALATION)
Status: DISCONTINUED | OUTPATIENT
Start: 2022-01-26 | End: 2022-01-29 | Stop reason: HOSPADM

## 2022-01-26 RX ORDER — SODIUM CHLORIDE 0.9 % (FLUSH) 0.9 %
10 SYRINGE (ML) INJECTION AS NEEDED
Status: DISCONTINUED | OUTPATIENT
Start: 2022-01-26 | End: 2022-01-29 | Stop reason: HOSPADM

## 2022-01-26 RX ORDER — ACETAMINOPHEN 650 MG/1
650 SUPPOSITORY RECTAL EVERY 4 HOURS PRN
Status: DISCONTINUED | OUTPATIENT
Start: 2022-01-26 | End: 2022-01-29 | Stop reason: HOSPADM

## 2022-01-26 RX ORDER — NICOTINE POLACRILEX 4 MG
15 LOZENGE BUCCAL
Status: DISCONTINUED | OUTPATIENT
Start: 2022-01-26 | End: 2022-01-29 | Stop reason: HOSPADM

## 2022-01-26 RX ORDER — BISACODYL 5 MG/1
5 TABLET, DELAYED RELEASE ORAL DAILY PRN
Status: DISCONTINUED | OUTPATIENT
Start: 2022-01-26 | End: 2022-01-29 | Stop reason: HOSPADM

## 2022-01-26 RX ORDER — PANTOPRAZOLE SODIUM 40 MG/10ML
40 INJECTION, POWDER, LYOPHILIZED, FOR SOLUTION INTRAVENOUS
Status: DISCONTINUED | OUTPATIENT
Start: 2022-01-27 | End: 2022-01-29 | Stop reason: HOSPADM

## 2022-01-26 RX ORDER — HEPARIN SODIUM 1000 [USP'U]/ML
2000 INJECTION, SOLUTION INTRAVENOUS; SUBCUTANEOUS AS NEEDED
Status: DISCONTINUED | OUTPATIENT
Start: 2022-01-26 | End: 2022-01-29 | Stop reason: HOSPADM

## 2022-01-26 RX ORDER — BUMETANIDE 1 MG/1
2 TABLET ORAL
Status: DISCONTINUED | OUTPATIENT
Start: 2022-01-27 | End: 2022-01-29 | Stop reason: HOSPADM

## 2022-01-26 RX ORDER — LISINOPRIL 5 MG/1
5 TABLET ORAL DAILY
Status: DISCONTINUED | OUTPATIENT
Start: 2022-01-27 | End: 2022-01-29 | Stop reason: HOSPADM

## 2022-01-26 RX ORDER — POLYETHYLENE GLYCOL 3350 17 G/17G
17 POWDER, FOR SOLUTION ORAL DAILY PRN
Status: DISCONTINUED | OUTPATIENT
Start: 2022-01-26 | End: 2022-01-29 | Stop reason: HOSPADM

## 2022-01-26 RX ORDER — FERROUS SULFATE TAB EC 324 MG (65 MG FE EQUIVALENT) 324 (65 FE) MG
324 TABLET DELAYED RESPONSE ORAL
Status: DISCONTINUED | OUTPATIENT
Start: 2022-01-27 | End: 2022-01-29 | Stop reason: HOSPADM

## 2022-01-26 RX ORDER — MONTELUKAST SODIUM 10 MG/1
10 TABLET ORAL NIGHTLY
Status: DISCONTINUED | OUTPATIENT
Start: 2022-01-27 | End: 2022-01-29 | Stop reason: HOSPADM

## 2022-01-26 RX ORDER — ONDANSETRON 2 MG/ML
4 INJECTION INTRAMUSCULAR; INTRAVENOUS EVERY 6 HOURS PRN
Status: DISCONTINUED | OUTPATIENT
Start: 2022-01-26 | End: 2022-01-29 | Stop reason: HOSPADM

## 2022-01-26 RX ORDER — ALBUMIN (HUMAN) 12.5 G/50ML
12.5 SOLUTION INTRAVENOUS AS NEEDED
Status: ACTIVE | OUTPATIENT
Start: 2022-01-26 | End: 2022-01-27

## 2022-01-26 RX ADMIN — SODIUM CHLORIDE, PRESERVATIVE FREE 10 ML: 5 INJECTION INTRAVENOUS at 23:34

## 2022-01-26 RX ADMIN — GABAPENTIN 800 MG: 400 CAPSULE ORAL at 19:01

## 2022-01-26 RX ADMIN — NYSTATIN: 100000 POWDER TOPICAL at 23:34

## 2022-01-26 RX ADMIN — INSULIN DETEMIR 35 UNITS: 100 INJECTION, SOLUTION SUBCUTANEOUS at 21:56

## 2022-01-26 RX ADMIN — HEPARIN SODIUM 2000 UNITS: 1000 INJECTION INTRAVENOUS; SUBCUTANEOUS at 21:39

## 2022-01-26 RX ADMIN — RANOLAZINE 500 MG: 500 TABLET, FILM COATED, EXTENDED RELEASE ORAL at 21:54

## 2022-01-26 RX ADMIN — METOPROLOL TARTRATE 25 MG: 25 TABLET, FILM COATED ORAL at 21:55

## 2022-01-26 RX ADMIN — HEPARIN SODIUM 2000 UNITS: 1000 INJECTION INTRAVENOUS; SUBCUTANEOUS at 21:38

## 2022-01-26 RX ADMIN — IPRATROPIUM BROMIDE AND ALBUTEROL SULFATE 3 ML: 2.5; .5 SOLUTION RESPIRATORY (INHALATION) at 22:36

## 2022-01-26 NOTE — HOME HEALTH
Subjective/Patient History: 61 y.o. female who presents to the ED with AMS. Patient is accompanied with  who states last known normal was around midnight. She went to bed around 9pm after dinner. She woke up multiple times through the night with increased urinary frequency and wet the bed. Around 7 am this morning patient's  noticed patient was altered and brought her to ED. Patient was admitted for diabetic ketoacidosis and remained in hospital from 1/9 to 1/20 before being DC back home.  Patient states that she is having quite a bit of pain from falling multiple times in the hospital. Patient states that she is not getting around very well but trying to keep herself moving more throughout the day. Patient reporting that she is slowly feeling better overall and hoping to be able to build up some more strength to return to her daily activities.     Prior Level of Function: mod i w/ gait, transfers and bed mobility, assistance from  for ADLs    Past Medical History: CKD stage 4 on dialsysi MWF, CAD s/p CABG x3  uncontrolled DM type 2, HFpEF, GERD, HTN, HLD, MIKE on CPAP    Patient Goal: get stronger, remain home, prevent falls

## 2022-01-27 ENCOUNTER — READMISSION MANAGEMENT (OUTPATIENT)
Dept: CALL CENTER | Facility: HOSPITAL | Age: 62
End: 2022-01-27

## 2022-01-27 ENCOUNTER — APPOINTMENT (OUTPATIENT)
Dept: GENERAL RADIOLOGY | Facility: HOSPITAL | Age: 62
End: 2022-01-27

## 2022-01-27 ENCOUNTER — HOME CARE VISIT (OUTPATIENT)
Dept: HOME HEALTH SERVICES | Facility: HOME HEALTHCARE | Age: 62
End: 2022-01-27

## 2022-01-27 PROBLEM — N18.5 CKD (CHRONIC KIDNEY DISEASE), SYMPTOM MANAGEMENT ONLY, STAGE 5: Status: RESOLVED | Noted: 2020-10-05 | Resolved: 2022-01-27

## 2022-01-27 LAB
ALBUMIN SERPL-MCNC: 3.5 G/DL (ref 3.5–5.2)
ALBUMIN/GLOB SERPL: 1 G/DL
ALP SERPL-CCNC: 116 U/L (ref 39–117)
ALT SERPL W P-5'-P-CCNC: 7 U/L (ref 1–33)
ANION GAP SERPL CALCULATED.3IONS-SCNC: 13 MMOL/L (ref 5–15)
AST SERPL-CCNC: 9 U/L (ref 1–32)
BACTERIA UR QL AUTO: ABNORMAL /HPF
BASOPHILS # BLD AUTO: 0.11 10*3/MM3 (ref 0–0.2)
BASOPHILS NFR BLD AUTO: 1.3 % (ref 0–1.5)
BILIRUB SERPL-MCNC: 0.4 MG/DL (ref 0–1.2)
BILIRUB UR QL STRIP: NEGATIVE
BUN SERPL-MCNC: 36 MG/DL (ref 8–23)
BUN/CREAT SERPL: 9.7 (ref 7–25)
CALCIUM SPEC-SCNC: 9.1 MG/DL (ref 8.6–10.5)
CHLORIDE SERPL-SCNC: 98 MMOL/L (ref 98–107)
CLARITY UR: CLEAR
CO2 SERPL-SCNC: 24 MMOL/L (ref 22–29)
COLOR UR: YELLOW
CREAT SERPL-MCNC: 3.72 MG/DL (ref 0.57–1)
DEPRECATED RDW RBC AUTO: 44.3 FL (ref 37–54)
EOSINOPHIL # BLD AUTO: 0.33 10*3/MM3 (ref 0–0.4)
EOSINOPHIL NFR BLD AUTO: 3.8 % (ref 0.3–6.2)
ERYTHROCYTE [DISTWIDTH] IN BLOOD BY AUTOMATED COUNT: 14.5 % (ref 12.3–15.4)
GFR SERPL CREATININE-BSD FRML MDRD: 12 ML/MIN/1.73
GFR SERPL CREATININE-BSD FRML MDRD: ABNORMAL ML/MIN/{1.73_M2}
GLOBULIN UR ELPH-MCNC: 3.6 GM/DL
GLUCOSE BLDC GLUCOMTR-MCNC: 167 MG/DL (ref 70–130)
GLUCOSE BLDC GLUCOMTR-MCNC: 188 MG/DL (ref 70–130)
GLUCOSE BLDC GLUCOMTR-MCNC: 201 MG/DL (ref 70–130)
GLUCOSE BLDC GLUCOMTR-MCNC: 211 MG/DL (ref 70–130)
GLUCOSE SERPL-MCNC: 203 MG/DL (ref 65–99)
GLUCOSE UR STRIP-MCNC: ABNORMAL MG/DL
HCT VFR BLD AUTO: 25.1 % (ref 34–46.6)
HGB BLD-MCNC: 8.5 G/DL (ref 12–15.9)
HGB UR QL STRIP.AUTO: NEGATIVE
HYALINE CASTS UR QL AUTO: ABNORMAL /LPF
IMM GRANULOCYTES # BLD AUTO: 0.08 10*3/MM3 (ref 0–0.05)
IMM GRANULOCYTES NFR BLD AUTO: 0.9 % (ref 0–0.5)
KETONES UR QL STRIP: NEGATIVE
LEUKOCYTE ESTERASE UR QL STRIP.AUTO: NEGATIVE
LYMPHOCYTES # BLD AUTO: 1.58 10*3/MM3 (ref 0.7–3.1)
LYMPHOCYTES NFR BLD AUTO: 18.2 % (ref 19.6–45.3)
MCH RBC QN AUTO: 28.7 PG (ref 26.6–33)
MCHC RBC AUTO-ENTMCNC: 33.9 G/DL (ref 31.5–35.7)
MCV RBC AUTO: 84.8 FL (ref 79–97)
MONOCYTES # BLD AUTO: 0.42 10*3/MM3 (ref 0.1–0.9)
MONOCYTES NFR BLD AUTO: 4.8 % (ref 5–12)
NEUTROPHILS NFR BLD AUTO: 6.18 10*3/MM3 (ref 1.7–7)
NEUTROPHILS NFR BLD AUTO: 71 % (ref 42.7–76)
NITRITE UR QL STRIP: NEGATIVE
NRBC BLD AUTO-RTO: 0 /100 WBC (ref 0–0.2)
PH UR STRIP.AUTO: 7.5 [PH] (ref 5–9)
PLATELET # BLD AUTO: 200 10*3/MM3 (ref 140–450)
PMV BLD AUTO: 8.7 FL (ref 6–12)
POTASSIUM SERPL-SCNC: 4 MMOL/L (ref 3.5–5.2)
PROT SERPL-MCNC: 7.1 G/DL (ref 6–8.5)
PROT UR QL STRIP: ABNORMAL
QT INTERVAL: 436 MS
QT INTERVAL: 456 MS
QTC INTERVAL: 451 MS
QTC INTERVAL: 467 MS
RBC # BLD AUTO: 2.96 10*6/MM3 (ref 3.77–5.28)
RBC # UR STRIP: ABNORMAL /HPF
REF LAB TEST METHOD: ABNORMAL
SARS-COV-2 N GENE RESP QL NAA+PROBE: NOT DETECTED
SODIUM SERPL-SCNC: 135 MMOL/L (ref 136–145)
SP GR UR STRIP: 1.01 (ref 1–1.03)
SQUAMOUS #/AREA URNS HPF: ABNORMAL /HPF
TROPONIN T SERPL-MCNC: 0.09 NG/ML (ref 0–0.03)
UROBILINOGEN UR QL STRIP: ABNORMAL
WBC # UR STRIP: ABNORMAL /HPF
WBC NRBC COR # BLD: 8.7 10*3/MM3 (ref 3.4–10.8)

## 2022-01-27 PROCEDURE — 85025 COMPLETE CBC W/AUTO DIFF WBC: CPT | Performed by: STUDENT IN AN ORGANIZED HEALTH CARE EDUCATION/TRAINING PROGRAM

## 2022-01-27 PROCEDURE — 82962 GLUCOSE BLOOD TEST: CPT

## 2022-01-27 PROCEDURE — G0378 HOSPITAL OBSERVATION PER HR: HCPCS

## 2022-01-27 PROCEDURE — C1769 GUIDE WIRE: HCPCS | Performed by: THORACIC SURGERY (CARDIOTHORACIC VASCULAR SURGERY)

## 2022-01-27 PROCEDURE — 96374 THER/PROPH/DIAG INJ IV PUSH: CPT

## 2022-01-27 PROCEDURE — 97162 PT EVAL MOD COMPLEX 30 MIN: CPT

## 2022-01-27 PROCEDURE — 94760 N-INVAS EAR/PLS OXIMETRY 1: CPT

## 2022-01-27 PROCEDURE — 25010000002 IOPAMIDOL 61 % SOLUTION: Performed by: THORACIC SURGERY (CARDIOTHORACIC VASCULAR SURGERY)

## 2022-01-27 PROCEDURE — 99214 OFFICE O/P EST MOD 30 MIN: CPT | Performed by: INTERNAL MEDICINE

## 2022-01-27 PROCEDURE — 36581 REPLACE TUNNELED CV CATH: CPT | Performed by: THORACIC SURGERY (CARDIOTHORACIC VASCULAR SURGERY)

## 2022-01-27 PROCEDURE — 99224 PR SBSQ OBSERVATION CARE/DAY 15 MINUTES: CPT | Performed by: STUDENT IN AN ORGANIZED HEALTH CARE EDUCATION/TRAINING PROGRAM

## 2022-01-27 PROCEDURE — 25010000002 HEPARIN (PORCINE) PER 1000 UNITS

## 2022-01-27 PROCEDURE — 77001 FLUOROGUIDE FOR VEIN DEVICE: CPT | Performed by: THORACIC SURGERY (CARDIOTHORACIC VASCULAR SURGERY)

## 2022-01-27 PROCEDURE — 25010000002 MIDAZOLAM PER 1 MG: Performed by: THORACIC SURGERY (CARDIOTHORACIC VASCULAR SURGERY)

## 2022-01-27 PROCEDURE — 25010000002 FENTANYL CITRATE (PF) 50 MCG/ML SOLUTION: Performed by: THORACIC SURGERY (CARDIOTHORACIC VASCULAR SURGERY)

## 2022-01-27 PROCEDURE — C1750 CATH, HEMODIALYSIS,LONG-TERM: HCPCS | Performed by: THORACIC SURGERY (CARDIOTHORACIC VASCULAR SURGERY)

## 2022-01-27 PROCEDURE — 63710000001 INSULIN DETEMIR PER 5 UNITS: Performed by: STUDENT IN AN ORGANIZED HEALTH CARE EDUCATION/TRAINING PROGRAM

## 2022-01-27 PROCEDURE — 94799 UNLISTED PULMONARY SVC/PX: CPT

## 2022-01-27 PROCEDURE — 93005 ELECTROCARDIOGRAM TRACING: CPT | Performed by: STUDENT IN AN ORGANIZED HEALTH CARE EDUCATION/TRAINING PROGRAM

## 2022-01-27 PROCEDURE — 63710000001 INSULIN ASPART PER 5 UNITS: Performed by: STUDENT IN AN ORGANIZED HEALTH CARE EDUCATION/TRAINING PROGRAM

## 2022-01-27 PROCEDURE — 81001 URINALYSIS AUTO W/SCOPE: CPT | Performed by: STUDENT IN AN ORGANIZED HEALTH CARE EDUCATION/TRAINING PROGRAM

## 2022-01-27 PROCEDURE — 80053 COMPREHEN METABOLIC PANEL: CPT | Performed by: STUDENT IN AN ORGANIZED HEALTH CARE EDUCATION/TRAINING PROGRAM

## 2022-01-27 PROCEDURE — 84484 ASSAY OF TROPONIN QUANT: CPT | Performed by: STUDENT IN AN ORGANIZED HEALTH CARE EDUCATION/TRAINING PROGRAM

## 2022-01-27 PROCEDURE — 97166 OT EVAL MOD COMPLEX 45 MIN: CPT

## 2022-01-27 PROCEDURE — 87635 SARS-COV-2 COVID-19 AMP PRB: CPT | Performed by: INTERNAL MEDICINE

## 2022-01-27 RX ORDER — MIDAZOLAM HYDROCHLORIDE 1 MG/ML
INJECTION INTRAMUSCULAR; INTRAVENOUS
Status: DISPENSED
Start: 2022-01-27 | End: 2022-01-27

## 2022-01-27 RX ORDER — FENTANYL CITRATE 50 UG/ML
INJECTION, SOLUTION INTRAMUSCULAR; INTRAVENOUS AS NEEDED
Status: DISCONTINUED | OUTPATIENT
Start: 2022-01-27 | End: 2022-01-27 | Stop reason: HOSPADM

## 2022-01-27 RX ORDER — LIDOCAINE HYDROCHLORIDE AND EPINEPHRINE 10; 10 MG/ML; UG/ML
INJECTION, SOLUTION INFILTRATION; PERINEURAL
Status: COMPLETED
Start: 2022-01-27 | End: 2022-01-27

## 2022-01-27 RX ORDER — HEPARIN SODIUM 1000 [USP'U]/ML
INJECTION, SOLUTION INTRAVENOUS; SUBCUTANEOUS
Status: COMPLETED
Start: 2022-01-27 | End: 2022-01-27

## 2022-01-27 RX ORDER — FENTANYL CITRATE 50 UG/ML
INJECTION, SOLUTION INTRAMUSCULAR; INTRAVENOUS
Status: DISPENSED
Start: 2022-01-27 | End: 2022-01-27

## 2022-01-27 RX ORDER — MIDAZOLAM HYDROCHLORIDE 1 MG/ML
INJECTION INTRAMUSCULAR; INTRAVENOUS AS NEEDED
Status: DISCONTINUED | OUTPATIENT
Start: 2022-01-27 | End: 2022-01-27 | Stop reason: HOSPADM

## 2022-01-27 RX ADMIN — NYSTATIN: 100000 POWDER TOPICAL at 08:56

## 2022-01-27 RX ADMIN — IPRATROPIUM BROMIDE AND ALBUTEROL SULFATE 3 ML: 2.5; .5 SOLUTION RESPIRATORY (INHALATION) at 21:15

## 2022-01-27 RX ADMIN — LEVOTHYROXINE SODIUM 25 MCG: 25 TABLET ORAL at 06:31

## 2022-01-27 RX ADMIN — RANOLAZINE 500 MG: 500 TABLET, FILM COATED, EXTENDED RELEASE ORAL at 08:55

## 2022-01-27 RX ADMIN — SODIUM CHLORIDE, PRESERVATIVE FREE 10 ML: 5 INJECTION INTRAVENOUS at 20:45

## 2022-01-27 RX ADMIN — INSULIN ASPART 30 UNITS: 100 INJECTION, SOLUTION INTRAVENOUS; SUBCUTANEOUS at 12:09

## 2022-01-27 RX ADMIN — INSULIN ASPART 8 UNITS: 100 INJECTION, SOLUTION INTRAVENOUS; SUBCUTANEOUS at 08:53

## 2022-01-27 RX ADMIN — LISINOPRIL 5 MG: 5 TABLET ORAL at 08:55

## 2022-01-27 RX ADMIN — ISOSORBIDE MONONITRATE 30 MG: 30 TABLET, EXTENDED RELEASE ORAL at 06:31

## 2022-01-27 RX ADMIN — LIDOCAINE HYDROCHLORIDE AND EPINEPHRINE: 10; 10 INJECTION, SOLUTION INFILTRATION; PERINEURAL at 11:02

## 2022-01-27 RX ADMIN — BUMETANIDE 2 MG: 1 TABLET ORAL at 08:57

## 2022-01-27 RX ADMIN — IPRATROPIUM BROMIDE AND ALBUTEROL SULFATE 3 ML: 2.5; .5 SOLUTION RESPIRATORY (INHALATION) at 14:15

## 2022-01-27 RX ADMIN — SODIUM CHLORIDE, PRESERVATIVE FREE 10 ML: 5 INJECTION INTRAVENOUS at 08:54

## 2022-01-27 RX ADMIN — METOPROLOL TARTRATE 25 MG: 25 TABLET, FILM COATED ORAL at 08:55

## 2022-01-27 RX ADMIN — INSULIN ASPART 30 UNITS: 100 INJECTION, SOLUTION INTRAVENOUS; SUBCUTANEOUS at 08:54

## 2022-01-27 RX ADMIN — GABAPENTIN 800 MG: 400 CAPSULE ORAL at 20:48

## 2022-01-27 RX ADMIN — INSULIN ASPART 4 UNITS: 100 INJECTION, SOLUTION INTRAVENOUS; SUBCUTANEOUS at 12:08

## 2022-01-27 RX ADMIN — INSULIN ASPART 30 UNITS: 100 INJECTION, SOLUTION INTRAVENOUS; SUBCUTANEOUS at 17:51

## 2022-01-27 RX ADMIN — OXYBUTYNIN CHLORIDE 5 MG: 5 TABLET, EXTENDED RELEASE ORAL at 08:55

## 2022-01-27 RX ADMIN — RANOLAZINE 500 MG: 500 TABLET, FILM COATED, EXTENDED RELEASE ORAL at 20:47

## 2022-01-27 RX ADMIN — INSULIN ASPART 8 UNITS: 100 INJECTION, SOLUTION INTRAVENOUS; SUBCUTANEOUS at 17:51

## 2022-01-27 RX ADMIN — INSULIN DETEMIR 30 UNITS: 100 INJECTION, SOLUTION SUBCUTANEOUS at 06:30

## 2022-01-27 RX ADMIN — MONTELUKAST 10 MG: 10 TABLET, FILM COATED ORAL at 20:45

## 2022-01-27 RX ADMIN — DOCUSATE SODIUM 50 MG AND SENNOSIDES 8.6 MG 2 TABLET: 8.6; 5 TABLET, FILM COATED ORAL at 20:48

## 2022-01-27 RX ADMIN — ATORVASTATIN CALCIUM 20 MG: 20 TABLET, FILM COATED ORAL at 08:55

## 2022-01-27 RX ADMIN — HEPARIN SODIUM 2000 UNITS: 1000 INJECTION INTRAVENOUS; SUBCUTANEOUS at 11:13

## 2022-01-27 RX ADMIN — CLOPIDOGREL BISULFATE 75 MG: 75 TABLET ORAL at 08:55

## 2022-01-27 RX ADMIN — GABAPENTIN 800 MG: 400 CAPSULE ORAL at 06:31

## 2022-01-27 RX ADMIN — NYSTATIN: 100000 POWDER TOPICAL at 17:51

## 2022-01-27 RX ADMIN — IPRATROPIUM BROMIDE AND ALBUTEROL SULFATE 3 ML: 2.5; .5 SOLUTION RESPIRATORY (INHALATION) at 07:48

## 2022-01-27 RX ADMIN — ACETAMINOPHEN 650 MG: 325 TABLET, FILM COATED ORAL at 08:55

## 2022-01-27 RX ADMIN — INSULIN DETEMIR 35 UNITS: 100 INJECTION, SOLUTION SUBCUTANEOUS at 21:27

## 2022-01-27 RX ADMIN — METOPROLOL TARTRATE 25 MG: 25 TABLET, FILM COATED ORAL at 20:45

## 2022-01-27 RX ADMIN — PANTOPRAZOLE SODIUM 40 MG: 40 INJECTION, POWDER, FOR SOLUTION INTRAVENOUS at 06:31

## 2022-01-27 RX ADMIN — FERROUS SULFATE TAB EC 324 MG (65 MG FE EQUIVALENT) 324 MG: 324 (65 FE) TABLET DELAYED RESPONSE at 08:55

## 2022-01-27 RX ADMIN — NYSTATIN: 100000 POWDER TOPICAL at 20:45

## 2022-01-27 RX ADMIN — GABAPENTIN 800 MG: 400 CAPSULE ORAL at 14:49

## 2022-01-27 NOTE — OUTREACH NOTE
Medical Week 2 Survey      Responses   [facility] patient discharged from? [place]   Does the patient have one of the following disease processes/diagnoses(primary or secondary)? Other   Week 2 attempt successful? No   Unsuccessful attempts Attempt 2   Revoke Readmitted          Kateryna Hurtado RN

## 2022-01-28 ENCOUNTER — ANESTHESIA (OUTPATIENT)
Dept: GASTROENTEROLOGY | Facility: HOSPITAL | Age: 62
End: 2022-01-28

## 2022-01-28 ENCOUNTER — ANESTHESIA EVENT (OUTPATIENT)
Dept: GASTROENTEROLOGY | Facility: HOSPITAL | Age: 62
End: 2022-01-28

## 2022-01-28 PROBLEM — K31.811 DUODENAL HEMORRHAGE DUE TO ANGIODYSPLASIA OF DUODENUM: Status: ACTIVE | Noted: 2022-01-28

## 2022-01-28 PROBLEM — Z99.2 ESRD ON HEMODIALYSIS: Status: ACTIVE | Noted: 2022-01-28

## 2022-01-28 PROBLEM — N18.6 ESRD ON HEMODIALYSIS: Status: ACTIVE | Noted: 2022-01-28

## 2022-01-28 LAB
ALBUMIN SERPL-MCNC: 3.5 G/DL (ref 3.5–5.2)
ALBUMIN/GLOB SERPL: 0.9 G/DL
ALP SERPL-CCNC: 127 U/L (ref 39–117)
ALT SERPL W P-5'-P-CCNC: 7 U/L (ref 1–33)
ANION GAP SERPL CALCULATED.3IONS-SCNC: 11 MMOL/L (ref 5–15)
AST SERPL-CCNC: 8 U/L (ref 1–32)
BASOPHILS # BLD AUTO: 0.09 10*3/MM3 (ref 0–0.2)
BASOPHILS NFR BLD AUTO: 1 % (ref 0–1.5)
BH BB BLOOD EXPIRATION DATE: NORMAL
BH BB BLOOD EXPIRATION DATE: NORMAL
BH BB BLOOD TYPE BARCODE: 5100
BH BB BLOOD TYPE BARCODE: 5100
BH BB DISPENSE STATUS: NORMAL
BH BB DISPENSE STATUS: NORMAL
BH BB PRODUCT CODE: NORMAL
BH BB PRODUCT CODE: NORMAL
BH BB UNIT NUMBER: NORMAL
BH BB UNIT NUMBER: NORMAL
BILIRUB SERPL-MCNC: 0.3 MG/DL (ref 0–1.2)
BUN SERPL-MCNC: 51 MG/DL (ref 8–23)
BUN/CREAT SERPL: 12.4 (ref 7–25)
CALCIUM SPEC-SCNC: 9.5 MG/DL (ref 8.6–10.5)
CHLORIDE SERPL-SCNC: 99 MMOL/L (ref 98–107)
CO2 SERPL-SCNC: 26 MMOL/L (ref 22–29)
CREAT SERPL-MCNC: 4.12 MG/DL (ref 0.57–1)
CROSSMATCH INTERPRETATION: NORMAL
CROSSMATCH INTERPRETATION: NORMAL
DEPRECATED RDW RBC AUTO: 47.3 FL (ref 37–54)
EOSINOPHIL # BLD AUTO: 0.34 10*3/MM3 (ref 0–0.4)
EOSINOPHIL NFR BLD AUTO: 3.6 % (ref 0.3–6.2)
ERYTHROCYTE [DISTWIDTH] IN BLOOD BY AUTOMATED COUNT: 14.6 % (ref 12.3–15.4)
GFR SERPL CREATININE-BSD FRML MDRD: 11 ML/MIN/1.73
GFR SERPL CREATININE-BSD FRML MDRD: ABNORMAL ML/MIN/{1.73_M2}
GLOBULIN UR ELPH-MCNC: 3.8 GM/DL
GLUCOSE BLDC GLUCOMTR-MCNC: 176 MG/DL (ref 70–130)
GLUCOSE BLDC GLUCOMTR-MCNC: 189 MG/DL (ref 70–130)
GLUCOSE BLDC GLUCOMTR-MCNC: 195 MG/DL (ref 70–130)
GLUCOSE BLDC GLUCOMTR-MCNC: 246 MG/DL (ref 70–130)
GLUCOSE SERPL-MCNC: 173 MG/DL (ref 65–99)
HCT VFR BLD AUTO: 28.7 % (ref 34–46.6)
HGB BLD-MCNC: 9.1 G/DL (ref 12–15.9)
IMM GRANULOCYTES # BLD AUTO: 0.06 10*3/MM3 (ref 0–0.05)
IMM GRANULOCYTES NFR BLD AUTO: 0.6 % (ref 0–0.5)
LYMPHOCYTES # BLD AUTO: 2.04 10*3/MM3 (ref 0.7–3.1)
LYMPHOCYTES NFR BLD AUTO: 21.7 % (ref 19.6–45.3)
MCH RBC QN AUTO: 28.5 PG (ref 26.6–33)
MCHC RBC AUTO-ENTMCNC: 31.7 G/DL (ref 31.5–35.7)
MCV RBC AUTO: 90 FL (ref 79–97)
MONOCYTES # BLD AUTO: 0.59 10*3/MM3 (ref 0.1–0.9)
MONOCYTES NFR BLD AUTO: 6.3 % (ref 5–12)
NEUTROPHILS NFR BLD AUTO: 6.27 10*3/MM3 (ref 1.7–7)
NEUTROPHILS NFR BLD AUTO: 66.8 % (ref 42.7–76)
NRBC BLD AUTO-RTO: 0 /100 WBC (ref 0–0.2)
PLATELET # BLD AUTO: 229 10*3/MM3 (ref 140–450)
PMV BLD AUTO: 8.9 FL (ref 6–12)
POTASSIUM SERPL-SCNC: 5 MMOL/L (ref 3.5–5.2)
PROT SERPL-MCNC: 7.3 G/DL (ref 6–8.5)
QT INTERVAL: 478 MS
QTC INTERVAL: 481 MS
RBC # BLD AUTO: 3.19 10*6/MM3 (ref 3.77–5.28)
SODIUM SERPL-SCNC: 136 MMOL/L (ref 136–145)
UNIT  ABO: NORMAL
UNIT  ABO: NORMAL
UNIT  RH: NORMAL
UNIT  RH: NORMAL
WBC NRBC COR # BLD: 9.39 10*3/MM3 (ref 3.4–10.8)

## 2022-01-28 PROCEDURE — 63710000001 METOPROLOL TARTRATE 25 MG TABLET: Performed by: INTERNAL MEDICINE

## 2022-01-28 PROCEDURE — A9270 NON-COVERED ITEM OR SERVICE: HCPCS | Performed by: INTERNAL MEDICINE

## 2022-01-28 PROCEDURE — 63710000001 MONTELUKAST 10 MG TABLET: Performed by: INTERNAL MEDICINE

## 2022-01-28 PROCEDURE — 94760 N-INVAS EAR/PLS OXIMETRY 1: CPT

## 2022-01-28 PROCEDURE — 85025 COMPLETE CBC W/AUTO DIFF WBC: CPT | Performed by: STUDENT IN AN ORGANIZED HEALTH CARE EDUCATION/TRAINING PROGRAM

## 2022-01-28 PROCEDURE — 43270 EGD LESION ABLATION: CPT | Performed by: INTERNAL MEDICINE

## 2022-01-28 PROCEDURE — 63710000001 SENNOSIDES-DOCUSATE 8.6-50 MG TABLET: Performed by: INTERNAL MEDICINE

## 2022-01-28 PROCEDURE — 63710000001 GABAPENTIN 400 MG CAPSULE: Performed by: INTERNAL MEDICINE

## 2022-01-28 PROCEDURE — G0378 HOSPITAL OBSERVATION PER HR: HCPCS

## 2022-01-28 PROCEDURE — 82962 GLUCOSE BLOOD TEST: CPT

## 2022-01-28 PROCEDURE — 93005 ELECTROCARDIOGRAM TRACING: CPT | Performed by: STUDENT IN AN ORGANIZED HEALTH CARE EDUCATION/TRAINING PROGRAM

## 2022-01-28 PROCEDURE — 63710000001 RANOLAZINE 500 MG TABLET SUSTAINED-RELEASE 12 HOUR: Performed by: INTERNAL MEDICINE

## 2022-01-28 PROCEDURE — 63710000001 INSULIN DETEMIR PER 5 UNITS: Performed by: INTERNAL MEDICINE

## 2022-01-28 PROCEDURE — 94799 UNLISTED PULMONARY SVC/PX: CPT

## 2022-01-28 PROCEDURE — 25010000002 EPOETIN ALFA-EPBX 10000 UNIT/ML SOLUTION: Performed by: STUDENT IN AN ORGANIZED HEALTH CARE EDUCATION/TRAINING PROGRAM

## 2022-01-28 PROCEDURE — G0257 UNSCHED DIALYSIS ESRD PT HOS: HCPCS

## 2022-01-28 PROCEDURE — 93010 ELECTROCARDIOGRAM REPORT: CPT | Performed by: INTERNAL MEDICINE

## 2022-01-28 PROCEDURE — 80053 COMPREHEN METABOLIC PANEL: CPT | Performed by: STUDENT IN AN ORGANIZED HEALTH CARE EDUCATION/TRAINING PROGRAM

## 2022-01-28 PROCEDURE — 63710000001 INSULIN ASPART PER 5 UNITS: Performed by: INTERNAL MEDICINE

## 2022-01-28 PROCEDURE — 96376 TX/PRO/DX INJ SAME DRUG ADON: CPT

## 2022-01-28 PROCEDURE — 99226 PR SBSQ OBSERVATION CARE/DAY 35 MINUTES: CPT | Performed by: CHIROPRACTOR

## 2022-01-28 PROCEDURE — 25010000002 HEPARIN (PORCINE) PER 1000 UNITS: Performed by: INTERNAL MEDICINE

## 2022-01-28 RX ORDER — DEXTROSE AND SODIUM CHLORIDE 5; .45 G/100ML; G/100ML
30 INJECTION, SOLUTION INTRAVENOUS CONTINUOUS PRN
Status: DISCONTINUED | OUTPATIENT
Start: 2022-01-28 | End: 2022-01-29 | Stop reason: HOSPADM

## 2022-01-28 RX ORDER — HEPARIN SODIUM 1000 [USP'U]/ML
2000 INJECTION, SOLUTION INTRAVENOUS; SUBCUTANEOUS AS NEEDED
Status: DISCONTINUED | OUTPATIENT
Start: 2022-01-28 | End: 2022-01-29 | Stop reason: HOSPADM

## 2022-01-28 RX ORDER — ALBUMIN (HUMAN) 12.5 G/50ML
12.5 SOLUTION INTRAVENOUS AS NEEDED
Status: ACTIVE | OUTPATIENT
Start: 2022-01-28 | End: 2022-01-28

## 2022-01-28 RX ADMIN — INSULIN ASPART 8 UNITS: 100 INJECTION, SOLUTION INTRAVENOUS; SUBCUTANEOUS at 17:04

## 2022-01-28 RX ADMIN — METOPROLOL TARTRATE 25 MG: 25 TABLET, FILM COATED ORAL at 20:56

## 2022-01-28 RX ADMIN — DOCUSATE SODIUM 50 MG AND SENNOSIDES 8.6 MG 2 TABLET: 8.6; 5 TABLET, FILM COATED ORAL at 20:55

## 2022-01-28 RX ADMIN — NYSTATIN: 100000 POWDER TOPICAL at 20:56

## 2022-01-28 RX ADMIN — MONTELUKAST 10 MG: 10 TABLET, FILM COATED ORAL at 20:55

## 2022-01-28 RX ADMIN — GABAPENTIN 800 MG: 400 CAPSULE ORAL at 20:55

## 2022-01-28 RX ADMIN — PANTOPRAZOLE SODIUM 40 MG: 40 INJECTION, POWDER, FOR SOLUTION INTRAVENOUS at 06:03

## 2022-01-28 RX ADMIN — LEVOTHYROXINE SODIUM 25 MCG: 25 TABLET ORAL at 06:03

## 2022-01-28 RX ADMIN — INSULIN DETEMIR 35 UNITS: 100 INJECTION, SOLUTION SUBCUTANEOUS at 20:56

## 2022-01-28 RX ADMIN — HEPARIN SODIUM 2000 UNITS: 1000 INJECTION INTRAVENOUS; SUBCUTANEOUS at 11:08

## 2022-01-28 RX ADMIN — RANOLAZINE 500 MG: 500 TABLET, FILM COATED, EXTENDED RELEASE ORAL at 20:56

## 2022-01-28 RX ADMIN — HEPARIN SODIUM 2000 UNITS: 1000 INJECTION INTRAVENOUS; SUBCUTANEOUS at 11:07

## 2022-01-28 RX ADMIN — IPRATROPIUM BROMIDE AND ALBUTEROL SULFATE 3 ML: 2.5; .5 SOLUTION RESPIRATORY (INHALATION) at 08:16

## 2022-01-28 RX ADMIN — SODIUM CHLORIDE, PRESERVATIVE FREE 10 ML: 5 INJECTION INTRAVENOUS at 21:04

## 2022-01-28 RX ADMIN — ISOSORBIDE MONONITRATE 30 MG: 30 TABLET, EXTENDED RELEASE ORAL at 06:09

## 2022-01-28 RX ADMIN — NYSTATIN: 100000 POWDER TOPICAL at 17:05

## 2022-01-28 RX ADMIN — EPOETIN ALFA-EPBX 10000 UNITS: 10000 INJECTION, SOLUTION INTRAVENOUS; SUBCUTANEOUS at 10:13

## 2022-01-28 RX ADMIN — TOPICAL ANESTHETIC 1 SPRAY: 200 SPRAY DENTAL; PERIODONTAL at 14:54

## 2022-01-28 RX ADMIN — IPRATROPIUM BROMIDE AND ALBUTEROL SULFATE 3 ML: 2.5; .5 SOLUTION RESPIRATORY (INHALATION) at 11:44

## 2022-01-28 NOTE — ANESTHESIA POSTPROCEDURE EVALUATION
Patient: Yamileth Slater    Procedure Summary     Date: 01/28/22 Room / Location: [facility] ENDOSCOPY 1 / [facility]COPY    Anesthesia Start: 1509 Anesthesia Stop: 1520    Procedure: ESOPHAGOGASTRODUODENOSCOPY (N/A ) Diagnosis:       Iron deficiency anemia due to chronic blood loss      (Iron deficiency anemia due to chronic blood loss [D50.0])    Surgeons: Mingo Duarte MD Provider: Vandana Hicks CRNA    Anesthesia Type: MAC ASA Status: 4          Anesthesia Type: MAC    Vitals  No vitals data found for the desired time range.          Post Anesthesia Care and Evaluation    Patient location during evaluation: PACU  Patient participation: complete - patient participated  Level of consciousness: awake and alert  Pain score: 0  Pain management: adequate  Airway patency: patent  Anesthetic complications: No anesthetic complications  PONV Status: none  Cardiovascular status: acceptable  Respiratory status: acceptable  Hydration status: acceptable

## 2022-01-29 ENCOUNTER — READMISSION MANAGEMENT (OUTPATIENT)
Dept: CALL CENTER | Facility: HOSPITAL | Age: 62
End: 2022-01-29

## 2022-01-29 VITALS
WEIGHT: 287.9 LBS | BODY MASS INDEX: 52.98 KG/M2 | HEIGHT: 62 IN | TEMPERATURE: 97.6 F | RESPIRATION RATE: 16 BRPM | HEART RATE: 77 BPM | OXYGEN SATURATION: 96 % | SYSTOLIC BLOOD PRESSURE: 132 MMHG | DIASTOLIC BLOOD PRESSURE: 60 MMHG

## 2022-01-29 PROBLEM — K31.811 DUODENAL HEMORRHAGE DUE TO ANGIODYSPLASIA OF DUODENUM: Status: RESOLVED | Noted: 2022-01-28 | Resolved: 2022-01-29

## 2022-01-29 PROBLEM — R07.9 CHEST PAIN: Status: RESOLVED | Noted: 2022-01-26 | Resolved: 2022-01-29

## 2022-01-29 LAB
ALBUMIN SERPL-MCNC: 3.3 G/DL (ref 3.5–5.2)
ALBUMIN/GLOB SERPL: 0.8 G/DL
ALP SERPL-CCNC: 127 U/L (ref 39–117)
ALT SERPL W P-5'-P-CCNC: 6 U/L (ref 1–33)
ANION GAP SERPL CALCULATED.3IONS-SCNC: 12 MMOL/L (ref 5–15)
AST SERPL-CCNC: 9 U/L (ref 1–32)
BASOPHILS # BLD AUTO: 0.08 10*3/MM3 (ref 0–0.2)
BASOPHILS NFR BLD AUTO: 1.1 % (ref 0–1.5)
BILIRUB SERPL-MCNC: 0.2 MG/DL (ref 0–1.2)
BUN SERPL-MCNC: 32 MG/DL (ref 8–23)
BUN/CREAT SERPL: 9.6 (ref 7–25)
CALCIUM SPEC-SCNC: 8.9 MG/DL (ref 8.6–10.5)
CHLORIDE SERPL-SCNC: 95 MMOL/L (ref 98–107)
CO2 SERPL-SCNC: 27 MMOL/L (ref 22–29)
CREAT SERPL-MCNC: 3.34 MG/DL (ref 0.57–1)
DEPRECATED RDW RBC AUTO: 47.3 FL (ref 37–54)
EOSINOPHIL # BLD AUTO: 0.27 10*3/MM3 (ref 0–0.4)
EOSINOPHIL NFR BLD AUTO: 3.6 % (ref 0.3–6.2)
ERYTHROCYTE [DISTWIDTH] IN BLOOD BY AUTOMATED COUNT: 14.9 % (ref 12.3–15.4)
GFR SERPL CREATININE-BSD FRML MDRD: 14 ML/MIN/1.73
GFR SERPL CREATININE-BSD FRML MDRD: ABNORMAL ML/MIN/{1.73_M2}
GLOBULIN UR ELPH-MCNC: 3.9 GM/DL
GLUCOSE BLDC GLUCOMTR-MCNC: 258 MG/DL (ref 70–130)
GLUCOSE BLDC GLUCOMTR-MCNC: 322 MG/DL (ref 70–130)
GLUCOSE BLDC GLUCOMTR-MCNC: 388 MG/DL (ref 70–130)
GLUCOSE SERPL-MCNC: 271 MG/DL (ref 65–99)
HCT VFR BLD AUTO: 27.3 % (ref 34–46.6)
HGB BLD-MCNC: 8.9 G/DL (ref 12–15.9)
IMM GRANULOCYTES # BLD AUTO: 0.06 10*3/MM3 (ref 0–0.05)
IMM GRANULOCYTES NFR BLD AUTO: 0.8 % (ref 0–0.5)
LYMPHOCYTES # BLD AUTO: 2.02 10*3/MM3 (ref 0.7–3.1)
LYMPHOCYTES NFR BLD AUTO: 26.9 % (ref 19.6–45.3)
MCH RBC QN AUTO: 29 PG (ref 26.6–33)
MCHC RBC AUTO-ENTMCNC: 32.6 G/DL (ref 31.5–35.7)
MCV RBC AUTO: 88.9 FL (ref 79–97)
MONOCYTES # BLD AUTO: 0.55 10*3/MM3 (ref 0.1–0.9)
MONOCYTES NFR BLD AUTO: 7.3 % (ref 5–12)
NEUTROPHILS NFR BLD AUTO: 4.53 10*3/MM3 (ref 1.7–7)
NEUTROPHILS NFR BLD AUTO: 60.3 % (ref 42.7–76)
NRBC BLD AUTO-RTO: 0 /100 WBC (ref 0–0.2)
PLATELET # BLD AUTO: 225 10*3/MM3 (ref 140–450)
PMV BLD AUTO: 8.9 FL (ref 6–12)
POTASSIUM SERPL-SCNC: 4.3 MMOL/L (ref 3.5–5.2)
PROT SERPL-MCNC: 7.2 G/DL (ref 6–8.5)
RBC # BLD AUTO: 3.07 10*6/MM3 (ref 3.77–5.28)
SODIUM SERPL-SCNC: 134 MMOL/L (ref 136–145)
WBC NRBC COR # BLD: 7.51 10*3/MM3 (ref 3.4–10.8)

## 2022-01-29 PROCEDURE — 63710000001 SENNOSIDES-DOCUSATE 8.6-50 MG TABLET: Performed by: INTERNAL MEDICINE

## 2022-01-29 PROCEDURE — 63710000001 LISINOPRIL 5 MG TABLET: Performed by: INTERNAL MEDICINE

## 2022-01-29 PROCEDURE — 82962 GLUCOSE BLOOD TEST: CPT

## 2022-01-29 PROCEDURE — A9270 NON-COVERED ITEM OR SERVICE: HCPCS | Performed by: INTERNAL MEDICINE

## 2022-01-29 PROCEDURE — 63710000001 ATORVASTATIN 20 MG TABLET: Performed by: INTERNAL MEDICINE

## 2022-01-29 PROCEDURE — 80053 COMPREHEN METABOLIC PANEL: CPT | Performed by: INTERNAL MEDICINE

## 2022-01-29 PROCEDURE — 63710000001 LEVOTHYROXINE 25 MCG TABLET: Performed by: INTERNAL MEDICINE

## 2022-01-29 PROCEDURE — 85025 COMPLETE CBC W/AUTO DIFF WBC: CPT | Performed by: INTERNAL MEDICINE

## 2022-01-29 PROCEDURE — 63710000001 METOPROLOL TARTRATE 25 MG TABLET: Performed by: INTERNAL MEDICINE

## 2022-01-29 PROCEDURE — 63710000001 BUMETANIDE 1 MG TABLET: Performed by: INTERNAL MEDICINE

## 2022-01-29 PROCEDURE — 63710000001 INSULIN ASPART PER 5 UNITS: Performed by: INTERNAL MEDICINE

## 2022-01-29 PROCEDURE — 63710000001 RANOLAZINE 500 MG TABLET SUSTAINED-RELEASE 12 HOUR: Performed by: INTERNAL MEDICINE

## 2022-01-29 PROCEDURE — G0378 HOSPITAL OBSERVATION PER HR: HCPCS

## 2022-01-29 PROCEDURE — 94799 UNLISTED PULMONARY SVC/PX: CPT

## 2022-01-29 PROCEDURE — 97110 THERAPEUTIC EXERCISES: CPT

## 2022-01-29 PROCEDURE — 63710000001 FERROUS SULFATE 324 (65 FE) MG TABLET DELAYED-RELEASE: Performed by: INTERNAL MEDICINE

## 2022-01-29 PROCEDURE — 63710000001 ISOSORBIDE MONONITRATE 30 MG TABLET SUSTAINED-RELEASE 24 HOUR: Performed by: INTERNAL MEDICINE

## 2022-01-29 PROCEDURE — 97535 SELF CARE MNGMENT TRAINING: CPT

## 2022-01-29 PROCEDURE — 63710000001 OXYBUTYNIN XL 5 MG TABLET SUSTAINED-RELEASE 24 HOUR: Performed by: INTERNAL MEDICINE

## 2022-01-29 PROCEDURE — 99217 PR OBSERVATION CARE DISCHARGE MANAGEMENT: CPT | Performed by: CHIROPRACTOR

## 2022-01-29 PROCEDURE — 94760 N-INVAS EAR/PLS OXIMETRY 1: CPT

## 2022-01-29 PROCEDURE — 63710000001 GABAPENTIN 400 MG CAPSULE: Performed by: INTERNAL MEDICINE

## 2022-01-29 RX ORDER — GABAPENTIN 800 MG/1
800 TABLET ORAL 3 TIMES DAILY
Qty: 90 TABLET | Refills: 0 | Status: ON HOLD | OUTPATIENT
Start: 2022-01-29 | End: 2022-02-25 | Stop reason: SDUPTHER

## 2022-01-29 RX ORDER — GABAPENTIN 800 MG/1
800 TABLET ORAL 3 TIMES DAILY
Qty: 90 TABLET | Refills: 0 | Status: SHIPPED | OUTPATIENT
Start: 2022-01-29 | End: 2022-02-15 | Stop reason: SDUPTHER

## 2022-01-29 RX ADMIN — NYSTATIN: 100000 POWDER TOPICAL at 10:25

## 2022-01-29 RX ADMIN — INSULIN ASPART 30 UNITS: 100 INJECTION, SOLUTION INTRAVENOUS; SUBCUTANEOUS at 10:25

## 2022-01-29 RX ADMIN — RANOLAZINE 500 MG: 500 TABLET, FILM COATED, EXTENDED RELEASE ORAL at 10:24

## 2022-01-29 RX ADMIN — ISOSORBIDE MONONITRATE 30 MG: 30 TABLET, EXTENDED RELEASE ORAL at 06:12

## 2022-01-29 RX ADMIN — GABAPENTIN 800 MG: 400 CAPSULE ORAL at 13:36

## 2022-01-29 RX ADMIN — SODIUM CHLORIDE, PRESERVATIVE FREE 10 ML: 5 INJECTION INTRAVENOUS at 06:12

## 2022-01-29 RX ADMIN — DOCUSATE SODIUM 50 MG AND SENNOSIDES 8.6 MG 2 TABLET: 8.6; 5 TABLET, FILM COATED ORAL at 10:25

## 2022-01-29 RX ADMIN — INSULIN ASPART 20 UNITS: 100 INJECTION, SOLUTION INTRAVENOUS; SUBCUTANEOUS at 10:27

## 2022-01-29 RX ADMIN — BUMETANIDE 2 MG: 1 TABLET ORAL at 10:24

## 2022-01-29 RX ADMIN — LISINOPRIL 5 MG: 5 TABLET ORAL at 10:24

## 2022-01-29 RX ADMIN — GABAPENTIN 800 MG: 400 CAPSULE ORAL at 06:12

## 2022-01-29 RX ADMIN — METOPROLOL TARTRATE 25 MG: 25 TABLET, FILM COATED ORAL at 10:24

## 2022-01-29 RX ADMIN — OXYBUTYNIN CHLORIDE 5 MG: 5 TABLET, EXTENDED RELEASE ORAL at 10:24

## 2022-01-29 RX ADMIN — IPRATROPIUM BROMIDE AND ALBUTEROL SULFATE 3 ML: 2.5; .5 SOLUTION RESPIRATORY (INHALATION) at 07:39

## 2022-01-29 RX ADMIN — ATORVASTATIN CALCIUM 20 MG: 20 TABLET, FILM COATED ORAL at 10:24

## 2022-01-29 RX ADMIN — IPRATROPIUM BROMIDE AND ALBUTEROL SULFATE 3 ML: 2.5; .5 SOLUTION RESPIRATORY (INHALATION) at 12:03

## 2022-01-29 RX ADMIN — FERROUS SULFATE TAB EC 324 MG (65 MG FE EQUIVALENT) 324 MG: 324 (65 FE) TABLET DELAYED RESPONSE at 10:24

## 2022-01-29 RX ADMIN — PANTOPRAZOLE SODIUM 40 MG: 40 INJECTION, POWDER, FOR SOLUTION INTRAVENOUS at 06:12

## 2022-01-29 RX ADMIN — LEVOTHYROXINE SODIUM 25 MCG: 25 TABLET ORAL at 06:12

## 2022-01-30 NOTE — OUTREACH NOTE
Prep Survey      Responses   [facility] discharged [facility]? [place]   Is LACE score < 7 ? No   Emergency Room discharge w/ pulse ox? 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 Other   Does the patient have Home health ordered? Yes   What is the Home health agency?  [facility]   Is there a DME ordered? No   Prep survey completed? Yes          Marybel Hsu RN

## 2022-01-31 ENCOUNTER — TRANSITIONAL CARE MANAGEMENT TELEPHONE ENCOUNTER (OUTPATIENT)
Dept: CALL CENTER | Facility: HOSPITAL | Age: 62
End: 2022-01-31

## 2022-01-31 ENCOUNTER — HOME CARE VISIT (OUTPATIENT)
Dept: HOME HEALTH SERVICES | Facility: HOME HEALTHCARE | Age: 62
End: 2022-01-31

## 2022-01-31 NOTE — OUTREACH NOTE
Call Center TCM Note      Responses   [facility] patient discharged from? [place]   Does the patient have one of the following disease processes/diagnoses(primary or secondary)? Other   TCM attempt successful? No   Unsuccessful attempts Attempt 2          Roma Lopez RN    1/31/2022, 13:37 EST

## 2022-01-31 NOTE — OUTREACH NOTE
Call Center TCM Note      Responses   [facility] [place] patient discharged from? [place]   Does the patient have one of the following disease processes/diagnoses(primary or secondary)? Other   TCM attempt successful? No  [ verbal release ]   Unsuccessful attempts Attempt 1  [attempted pt,  and daughter--dtr reports that pt is likely at  this am---will attempt later this afternoon]   Comments regarding PCP Hospital PCP FOLLOW UP APPOINTMENT IS 22@230pm          Roma Lopez RN    2022, 11:10 EST

## 2022-02-01 ENCOUNTER — HOME CARE VISIT (OUTPATIENT)
Dept: HOME HEALTH SERVICES | Facility: CLINIC | Age: 62
End: 2022-02-01

## 2022-02-01 ENCOUNTER — OFFICE VISIT (OUTPATIENT)
Dept: FAMILY MEDICINE CLINIC | Facility: CLINIC | Age: 62
End: 2022-02-01

## 2022-02-01 ENCOUNTER — LAB (OUTPATIENT)
Dept: LAB | Facility: HOSPITAL | Age: 62
End: 2022-02-01

## 2022-02-01 ENCOUNTER — TRANSITIONAL CARE MANAGEMENT TELEPHONE ENCOUNTER (OUTPATIENT)
Dept: CALL CENTER | Facility: HOSPITAL | Age: 62
End: 2022-02-01

## 2022-02-01 VITALS
BODY MASS INDEX: 53.24 KG/M2 | SYSTOLIC BLOOD PRESSURE: 142 MMHG | HEART RATE: 77 BPM | DIASTOLIC BLOOD PRESSURE: 72 MMHG | OXYGEN SATURATION: 96 % | HEIGHT: 62 IN | TEMPERATURE: 97.7 F | WEIGHT: 289.3 LBS

## 2022-02-01 DIAGNOSIS — D50.0 IRON DEFICIENCY ANEMIA DUE TO CHRONIC BLOOD LOSS: ICD-10-CM

## 2022-02-01 DIAGNOSIS — E11.21 TYPE 2 DIABETES MELLITUS WITH DIABETIC NEPHROPATHY, WITH LONG-TERM CURRENT USE OF INSULIN: ICD-10-CM

## 2022-02-01 DIAGNOSIS — Z13.29 THYROID DISORDER SCREENING: ICD-10-CM

## 2022-02-01 DIAGNOSIS — E11.49 NEUROLOGIC DISORDER ASSOCIATED WITH DIABETES MELLITUS: ICD-10-CM

## 2022-02-01 DIAGNOSIS — I25.110 CORONARY ARTERY DISEASE INVOLVING NATIVE CORONARY ARTERY OF NATIVE HEART WITH UNSTABLE ANGINA PECTORIS: ICD-10-CM

## 2022-02-01 DIAGNOSIS — E11.21 TYPE 2 DIABETES MELLITUS WITH DIABETIC NEPHROPATHY, WITH LONG-TERM CURRENT USE OF INSULIN: Primary | ICD-10-CM

## 2022-02-01 DIAGNOSIS — Z79.4 TYPE 2 DIABETES MELLITUS WITH DIABETIC NEPHROPATHY, WITH LONG-TERM CURRENT USE OF INSULIN: Primary | ICD-10-CM

## 2022-02-01 DIAGNOSIS — M16.0 PRIMARY OSTEOARTHRITIS OF BOTH HIPS: ICD-10-CM

## 2022-02-01 DIAGNOSIS — Z79.4 TYPE 2 DIABETES MELLITUS WITH DIABETIC NEPHROPATHY, WITH LONG-TERM CURRENT USE OF INSULIN: ICD-10-CM

## 2022-02-01 PROCEDURE — 85025 COMPLETE CBC W/AUTO DIFF WBC: CPT

## 2022-02-01 PROCEDURE — 36415 COLL VENOUS BLD VENIPUNCTURE: CPT

## 2022-02-01 PROCEDURE — 84439 ASSAY OF FREE THYROXINE: CPT

## 2022-02-01 PROCEDURE — 83036 HEMOGLOBIN GLYCOSYLATED A1C: CPT

## 2022-02-01 PROCEDURE — 99496 TRANSJ CARE MGMT HIGH F2F 7D: CPT | Performed by: FAMILY MEDICINE

## 2022-02-01 PROCEDURE — 1111F DSCHRG MED/CURRENT MED MERGE: CPT | Performed by: FAMILY MEDICINE

## 2022-02-01 PROCEDURE — 84443 ASSAY THYROID STIM HORMONE: CPT

## 2022-02-01 RX ORDER — RANOLAZINE 500 MG/1
500 TABLET, EXTENDED RELEASE ORAL EVERY 12 HOURS SCHEDULED
Qty: 60 TABLET | Refills: 0 | Status: SHIPPED | OUTPATIENT
Start: 2022-02-01 | End: 2022-03-18 | Stop reason: SDUPTHER

## 2022-02-01 RX ORDER — PROMETHAZINE HYDROCHLORIDE 25 MG/1
25 TABLET ORAL EVERY 6 HOURS PRN
Status: CANCELLED | OUTPATIENT
Start: 2022-02-01

## 2022-02-01 RX ORDER — GABAPENTIN 800 MG/1
800 TABLET ORAL 3 TIMES DAILY
Qty: 90 TABLET | Refills: 0 | Status: CANCELLED | OUTPATIENT
Start: 2022-02-01 | End: 2022-03-03

## 2022-02-01 RX ORDER — LIDOCAINE 50 MG/G
PATCH TOPICAL
Qty: 30 PATCH | Refills: 1 | Status: CANCELLED | OUTPATIENT
Start: 2022-02-01

## 2022-02-01 RX ORDER — SENNOSIDES 8.6 MG
650 CAPSULE ORAL EVERY 8 HOURS PRN
Qty: 90 TABLET | Refills: 0 | Status: SHIPPED | OUTPATIENT
Start: 2022-02-01

## 2022-02-01 RX ORDER — LISINOPRIL 5 MG/1
5 TABLET ORAL DAILY
Qty: 30 TABLET | Refills: 0 | Status: SHIPPED | OUTPATIENT
Start: 2022-02-01 | End: 2022-03-03 | Stop reason: SDUPTHER

## 2022-02-01 NOTE — OUTREACH NOTE
Call Center TCM Note      Responses   [facility] patient discharged from? [place]   Does the patient have one of the following disease processes/diagnoses(primary or secondary)? Other   TCM attempt successful? No   Unsuccessful attempts Attempt 3  [call to pt x 2, call to dtr who has not spoke with pt recently and deferred to pt]          Roma Lopez RN    2/1/2022, 08:45 EST

## 2022-02-02 LAB
BASOPHILS # BLD AUTO: 0.07 10*3/MM3 (ref 0–0.2)
BASOPHILS NFR BLD AUTO: 1.1 % (ref 0–1.5)
DEPRECATED RDW RBC AUTO: 45.9 FL (ref 37–54)
EOSINOPHIL # BLD AUTO: 0.26 10*3/MM3 (ref 0–0.4)
EOSINOPHIL NFR BLD AUTO: 4 % (ref 0.3–6.2)
ERYTHROCYTE [DISTWIDTH] IN BLOOD BY AUTOMATED COUNT: 14.5 % (ref 12.3–15.4)
HBA1C MFR BLD: 8.3 % (ref 4.8–5.6)
HCT VFR BLD AUTO: 26.1 % (ref 34–46.6)
HGB BLD-MCNC: 8.4 G/DL (ref 12–15.9)
IMM GRANULOCYTES # BLD AUTO: 0.02 10*3/MM3 (ref 0–0.05)
IMM GRANULOCYTES NFR BLD AUTO: 0.3 % (ref 0–0.5)
LYMPHOCYTES # BLD AUTO: 2.14 10*3/MM3 (ref 0.7–3.1)
LYMPHOCYTES NFR BLD AUTO: 33.2 % (ref 19.6–45.3)
MCH RBC QN AUTO: 28.3 PG (ref 26.6–33)
MCHC RBC AUTO-ENTMCNC: 32.2 G/DL (ref 31.5–35.7)
MCV RBC AUTO: 87.9 FL (ref 79–97)
MONOCYTES # BLD AUTO: 0.61 10*3/MM3 (ref 0.1–0.9)
MONOCYTES NFR BLD AUTO: 9.5 % (ref 5–12)
NEUTROPHILS NFR BLD AUTO: 3.35 10*3/MM3 (ref 1.7–7)
NEUTROPHILS NFR BLD AUTO: 51.9 % (ref 42.7–76)
NRBC BLD AUTO-RTO: 0 /100 WBC (ref 0–0.2)
PLATELET # BLD AUTO: 289 10*3/MM3 (ref 140–450)
PMV BLD AUTO: 9.1 FL (ref 6–12)
RBC # BLD AUTO: 2.97 10*6/MM3 (ref 3.77–5.28)
T4 FREE SERPL-MCNC: 0.96 NG/DL (ref 0.93–1.7)
TSH SERPL DL<=0.05 MIU/L-ACNC: 2.14 UIU/ML (ref 0.27–4.2)
WBC NRBC COR # BLD: 6.45 10*3/MM3 (ref 3.4–10.8)

## 2022-02-03 ENCOUNTER — HOME CARE VISIT (OUTPATIENT)
Dept: HOME HEALTH SERVICES | Facility: CLINIC | Age: 62
End: 2022-02-03

## 2022-02-03 NOTE — CASE COMMUNICATION
Patient missed a OT visit from [facility] on 2/3/22.     Reason: via phone conversation, patient not home on this date, staying with family at another address, declined session.    Huy PASTOR  2/3/22      For your records only.   As per home health protocol, MD must be notified of missed/cancelled visits; therefore the prescribed frequency was not met.

## 2022-02-05 NOTE — PROGRESS NOTES
Transitional Care Follow Up Visit  Subjective     Yamileth Slater is a 61 y.o. female who presents for a transitional care management visit s/p repair of angiodysplasia of duodenum.    Within 48 business hours after discharge our office contacted her via telephone to coordinate her care and needs.      I reviewed and discussed the details of that call along with the discharge summary, hospital problems, inpatient lab results, inpatient diagnostic studies, and consultation reports with Yamileth.     Current outpatient and discharge medications have been reconciled for the patient.  Reviewed by: Kavita Goldsmith MD      Date of TCM Phone Call 1/29/2022   [facility]   Date of Admission 1/26/2022   Date of Discharge 1/29/2022   [place] Dis[place] [facility]     Risk for Readmission (LACE) Score: 13 (1/29/2022  5:01 AM)    Patient is here today with her  for hospital follow up.  Her main concern is right hip pain with bruises over torso where blood was taken white in hospital .  She is also inquiring about why she received synthroid in the hospital as she is unaware of a thyroid disorder.  Her neuropathy is well controlled on Gabapentin.  Requested assistance with replacing her cgm  and she notes not other concerns at this visit.      Patient is a 61 y.o. female presented with  a CMH of COPD dependent on 3L NC, CKD/ESRD MWF dialysis, CHF with HFpEF, CAD s/p stent, DM type 2, HTN, HLD, Anemia who presents complaining of  Chest pain and shortness of breath. Had chest pain and soa on Thursday and Friday which resolved and returned yesterday night. She went to dialysis today and symptoms persisted and also developed nausea at that time. She was given nitroglycerin in dialysis center and chest pain went from 10/10 to 7/10. She was sent to ED from dialysis. She states she has not missed dialysis since last discharge on 01/20/2022. Patient appears pale in ED however she has not noticed bright  red blood in stool or dark tarry stools.    Course During Hospital Stay:  Patient seen in ED after finishing dialysis session.  Known to have chronically elevated troponins.  Also found to be anemic.  FOBT positive.  Family medicine residency service was contacted and agreed to admit patient for work-up of chest pain and shortness of breath.  Anemia was determined to be the most likely cause of patient's current complaints.  Consent obtained from patient for blood transfusion.  2 units packed red blood cells administered to patient.  Nephrology was consulted to help manage ESRD on HD.  Gastroenterology on call consulted given patient's anemia with fecal occult blood present.  Patient's gastroenterologist Dr. Duarte was notified of patient's admission to hospital.  1/27/2022 patient found to have a poorly functioning tunneled catheter, new catheter put in place by cardiothoracic surgery.  PT and OT consulted and worked with patient for mobility and strength issues.  Patient made n.p.o. at midnight for anticipated EGD following day.  Gastroenterology successfully performed EGD on 1/28/2022 with ablation of angioectasia in the duodenum.  Patient tolerated procedure well and was subsequently able to resume her normal diet.  Patient continued to improve with resolution of the chest pain and her acute increase in shortness of breath.  By the following morning, 1/29/2022 was found to be stable and fit for discharge.  Home medications were continued.  Patient to restart on home oxygen of 3 L for COPD.  Gabapentin sent to patient's pharmacy.  Home health was ordered with PT OT to be continued on outpatient basis for continued strengthening and mobility issues.     The following portions of the patient's history were reviewed and updated as appropriate: allergies, current medications, past family history, past medical history, past social history, past surgical history and problem list.    Review of Systems   Constitutional:  Negative for chills, fatigue and fever.   HENT: Negative for rhinorrhea, sore throat and trouble swallowing.    Eyes: Negative for pain, itching and visual disturbance.   Respiratory: Negative for cough and shortness of breath.    Cardiovascular: Negative.  Negative for chest pain and leg swelling.   Gastrointestinal: Negative for abdominal pain, blood in stool, constipation, diarrhea, nausea and vomiting.   Endocrine: Negative for polydipsia and polyuria.   Genitourinary: Negative for dysuria and hematuria.   Musculoskeletal: Positive for arthralgias. Negative for back pain.   Skin: Negative for rash.   Allergic/Immunologic: Negative for immunocompromised state.   Neurological: Positive for numbness. Negative for dizziness, weakness and headaches.   Hematological: Does not bruise/bleed easily.   Psychiatric/Behavioral: Negative for confusion, hallucinations and sleep disturbance. The patient is nervous/anxious.        Objective   Physical Exam  Constitutional:       Comments: Patient seen sitting in wheelchair with caretaker sitting behind her.    HENT:      Head: Normocephalic and atraumatic.      Mouth/Throat:      Mouth: Mucous membranes are moist.   Eyes:      Extraocular Movements: Extraocular movements intact.      Conjunctiva/sclera: Conjunctivae normal.   Cardiovascular:      Rate and Rhythm: Normal rate and regular rhythm.      Pulses: Normal pulses.      Heart sounds: Normal heart sounds.   Pulmonary:      Effort: Pulmonary effort is normal.      Breath sounds: Normal breath sounds.   Abdominal:      Palpations: Abdomen is soft.   Musculoskeletal:         General: No swelling.      Cervical back: Normal range of motion and neck supple.   Skin:     General: Skin is warm and dry.      Capillary Refill: Capillary refill takes less than 2 seconds.   Neurological:      Mental Status: She is alert and oriented to person, place, and time.   Psychiatric:         Thought Content: Thought content normal.        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)    Relevant Medications    insulin aspart (novoLOG FLEXPEN) 100 UNIT/ML solution pen-injector sc pen    insulin detemir (LEVEMIR) 100 UNIT/ML injection    Type 2 diabetes mellitus with kidney complication, with long-term current use of insulin (HCC) - Primary    Relevant Medications    Continuous Blood Gluc Sensor (FreeStyle Jade 2 Sensor) misc    insulin aspart (novoLOG FLEXPEN) 100 UNIT/ML solution pen-injector sc pen    insulin detemir (LEVEMIR) 100 UNIT/ML injection    Other Relevant Orders    Hemoglobin A1c (Completed)       Hematology and Neoplasia    Anemia    Relevant Orders    CBC w AUTO Differential (Completed)      Other Visit Diagnoses     Thyroid disorder screening        Relevant Orders    TSH+Free T4 (Completed)    Primary osteoarthritis of both hips        Relevant Medications    acetaminophen (Tylenol 8 Hour) 650 MG 8 hr tablet      Diagnoses       Codes Comments    Type 2 diabetes mellitus with diabetic nephropathy, with long-term current use of insulin (HCC)    -  Primary  Free style jade disc placed under Tay supervision .  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

## 2022-02-06 NOTE — CASE COMMUNICATION
Pt missed a physical therapy appt on 2/3/22 due to inclement weather. Patient is staying with family at different location in [place].

## 2022-02-07 ENCOUNTER — READMISSION MANAGEMENT (OUTPATIENT)
Dept: CALL CENTER | Facility: HOSPITAL | Age: 62
End: 2022-02-07

## 2022-02-07 RX ORDER — METOPROLOL TARTRATE 50 MG/1
TABLET, FILM COATED ORAL
Qty: 60 TABLET | Refills: 1 | OUTPATIENT
Start: 2022-02-07

## 2022-02-07 RX ORDER — ATORVASTATIN CALCIUM 20 MG/1
TABLET, FILM COATED ORAL
Qty: 30 TABLET | Refills: 1 | Status: SHIPPED | OUTPATIENT
Start: 2022-02-07 | End: 2022-03-03 | Stop reason: SDUPTHER

## 2022-02-07 NOTE — OUTREACH NOTE
Medical Week 2 Survey      Responses   [facility] [place] patient discharged from? [place]   Does the patient have one of the following disease processes/diagnoses(primary or secondary)? Other   Week 2 attempt successful? 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 Yes   Is the patient having any side effects they believe may be caused by any medication additions or changes? No   Is the patient taking all medications as directed (includes completed medication regime)? Yes   Does the patient have a primary care provider?  Yes   Has the patient kept scheduled appointments due by today? Yes   What is the Home health agency?  [facility]   Has home health visited the patient within 72 hours of discharge? Yes   Psychosocial issues? No   Did the patient receive a copy of their discharge instructions? Yes   Nursing interventions Reviewed instructions with patient   What is the patient's perception of their health status since discharge? Same   Is the patient/caregiver able to teach back the hierarchy of who to call/visit for symptoms/problems? PCP, Specialist, Home health nurse, Urgent Care, ED, 911 Yes   If the patient is a current smoker, are they able to teach back resources for cessation? Not a smoker   Week 2 Call Completed? Yes          JUDE BROTHERS RN

## 2022-02-07 NOTE — PROGRESS NOTES
The patient presents today for a post hospital TCM visit.  I was present onsite throughout the encounter and saw the patient alongside Dr. Goldsmith.  Please see her note for more detailed information regarding today's encounter physical exam findings and plan of care.  I have reviewed the notes, assessments, and/or procedures performed by Dr. Goldsmith, I concur with her/his documentation and assessment and plan for Yamileth Slater.                This document has been electronically signed by Blake Burris MD on February 7, 2022 09:40 CST

## 2022-02-08 RX ORDER — METOPROLOL TARTRATE 50 MG/1
TABLET, FILM COATED ORAL
Qty: 60 TABLET | Refills: 1 | OUTPATIENT
Start: 2022-02-08

## 2022-02-10 ENCOUNTER — HOME CARE VISIT (OUTPATIENT)
Dept: HOME HEALTH SERVICES | Facility: CLINIC | Age: 62
End: 2022-02-10

## 2022-02-10 PROCEDURE — G0157 HHC PT ASSISTANT EA 15: HCPCS

## 2022-02-10 PROCEDURE — G0158 HHC OT ASSISTANT EA 15: HCPCS

## 2022-02-10 NOTE — CASE COMMUNICATION
Patient would like 1 more OT visit for next week, this was the only OT visit d/t last weeks weather and appointments. Patient did fair on this date, however, she was fatigued.  PT will be adding multiple visits.    Huy PASTOR  2/10/22

## 2022-02-11 VITALS
TEMPERATURE: 97.6 F | HEART RATE: 68 BPM | OXYGEN SATURATION: 99 % | RESPIRATION RATE: 18 BRPM | DIASTOLIC BLOOD PRESSURE: 82 MMHG | SYSTOLIC BLOOD PRESSURE: 124 MMHG

## 2022-02-14 NOTE — CASE COMMUNICATION
Patient is progressing well toward all goals achieved.  Patient is pleased with progress and feels safe and able to manage independently without concern.  Pt needs one visit ordered to finalize and ensure independence with HEP, gat, and MMT.  .

## 2022-02-15 ENCOUNTER — READMISSION MANAGEMENT (OUTPATIENT)
Dept: CALL CENTER | Facility: HOSPITAL | Age: 62
End: 2022-02-15

## 2022-02-15 ENCOUNTER — OFFICE VISIT (OUTPATIENT)
Dept: CARDIOLOGY | Facility: CLINIC | Age: 62
End: 2022-02-15

## 2022-02-15 VITALS
DIASTOLIC BLOOD PRESSURE: 70 MMHG | HEIGHT: 62 IN | WEIGHT: 282 LBS | HEART RATE: 65 BPM | TEMPERATURE: 97.8 F | BODY MASS INDEX: 51.89 KG/M2 | SYSTOLIC BLOOD PRESSURE: 140 MMHG | OXYGEN SATURATION: 100 %

## 2022-02-15 DIAGNOSIS — E78.2 MIXED HYPERLIPIDEMIA: ICD-10-CM

## 2022-02-15 DIAGNOSIS — E11.22 TYPE 2 DIABETES MELLITUS WITH CHRONIC KIDNEY DISEASE ON CHRONIC DIALYSIS, WITH LONG-TERM CURRENT USE OF INSULIN: ICD-10-CM

## 2022-02-15 DIAGNOSIS — Z99.2 TYPE 2 DIABETES MELLITUS WITH CHRONIC KIDNEY DISEASE ON CHRONIC DIALYSIS, WITH LONG-TERM CURRENT USE OF INSULIN: ICD-10-CM

## 2022-02-15 DIAGNOSIS — N18.6 ESRD ON HEMODIALYSIS: ICD-10-CM

## 2022-02-15 DIAGNOSIS — E66.01 MORBID OBESITY WITH BMI OF 50.0-59.9, ADULT: ICD-10-CM

## 2022-02-15 DIAGNOSIS — I25.10 CORONARY ARTERY DISEASE INVOLVING NATIVE CORONARY ARTERY OF NATIVE HEART WITHOUT ANGINA PECTORIS: Primary | ICD-10-CM

## 2022-02-15 DIAGNOSIS — N18.6 TYPE 2 DIABETES MELLITUS WITH CHRONIC KIDNEY DISEASE ON CHRONIC DIALYSIS, WITH LONG-TERM CURRENT USE OF INSULIN: ICD-10-CM

## 2022-02-15 DIAGNOSIS — I10 PRIMARY HYPERTENSION: ICD-10-CM

## 2022-02-15 DIAGNOSIS — Z99.2 ESRD ON HEMODIALYSIS: ICD-10-CM

## 2022-02-15 DIAGNOSIS — Z79.4 TYPE 2 DIABETES MELLITUS WITH CHRONIC KIDNEY DISEASE ON CHRONIC DIALYSIS, WITH LONG-TERM CURRENT USE OF INSULIN: ICD-10-CM

## 2022-02-15 PROCEDURE — 99214 OFFICE O/P EST MOD 30 MIN: CPT | Performed by: INTERNAL MEDICINE

## 2022-02-15 RX ORDER — SEVELAMER CARBONATE 800 MG/1
800 TABLET, FILM COATED ORAL
COMMUNITY
Start: 2022-01-26

## 2022-02-15 RX ORDER — SYRINGE-NEEDLE,INSULIN,0.5 ML 30 GX5/16"
SYRINGE, EMPTY DISPOSABLE MISCELLANEOUS
COMMUNITY
Start: 2021-12-01

## 2022-02-15 RX ORDER — POTASSIUM CHLORIDE 750 MG/1
10 CAPSULE, EXTENDED RELEASE ORAL DAILY
COMMUNITY
Start: 2022-01-26 | End: 2022-03-17 | Stop reason: HOSPADM

## 2022-02-15 RX ORDER — LEVOFLOXACIN 250 MG/1
TABLET ORAL
Status: ON HOLD | COMMUNITY
Start: 2022-02-04 | End: 2022-02-24

## 2022-02-15 RX ORDER — INSULIN GLARGINE 100 [IU]/ML
20 INJECTION, SOLUTION SUBCUTANEOUS
Status: ON HOLD | COMMUNITY
End: 2022-02-24

## 2022-02-15 RX ORDER — LISINOPRIL 10 MG/1
10 TABLET ORAL DAILY
COMMUNITY
Start: 2021-12-01 | End: 2022-02-15

## 2022-02-15 RX ORDER — CYCLOBENZAPRINE HCL 5 MG
10 TABLET ORAL
COMMUNITY
End: 2022-04-26 | Stop reason: SDUPTHER

## 2022-02-15 RX ORDER — SODIUM BICARBONATE 650 MG/1
1300 TABLET ORAL
COMMUNITY
End: 2022-02-24 | Stop reason: SDUPTHER

## 2022-02-15 NOTE — PROGRESS NOTES
Yamileth Sylvester Bryon  61 y.o. female    1. Coronary artery disease involving native coronary artery of native heart without angina pectoris    2. Primary hypertension    3. ESRD on hemodialysis ([facility])    4. Mixed hyperlipidemia    5. Morbid obesity with BMI of 50.0-59.9, adult ([facility])    6. Type 2 diabetes mellitus with chronic kidney disease on chronic dialysis, with long-term current use of insulin ([facility])        History of Present Illness  This is a 61-year-old female with past medical history of hypertension, hyperlipidemia, obesity, IDDM, CKD, CAD s/p CABG and COPD (oxygen dependent), sleep apnea on CPAP, peripheral vascular disease with history of left carotid endarterectomy. She has had multiple cardiac interventions in the past.  She was actually referred for redo CABG to [place] but instead underwent complex PCI to discrete lesion and left main coronary artery/proximal left circumflex coronary artery and proximal SVG to ramus intermedius. She subsequently presented in November 2021 with unstable angina and underwent cardiac catheterization and PCI to left main/proximal circumflex coronary artery by Dr. Rios.    She has since had multiple hospital admissions for multiple issues including uncontrolled diabetes, mental status changes, DKA/HHS. The last admission was in January 2022. Fortunately, she remained stable from a cardiac standpoint at that time and maximal medical management was continued.  · Echocardiogram in January 2022 showed:  · The study is technically difficult for diagnosis. The quality of the study is limited due to patient body habitus and lung disease. Verbal consent was obtained from the patient to use Definity image enhancer in order to optimize the study.  · Left ventricular wall thickness is consistent with mild concentric hypertrophy.  · Estimated left ventricular EF = 65% Left ventricular ejection fraction appears to be 61 - 65%. Left ventricular systolic function is normal.  · Left  ventricular diastolic function is consistent with (grade Ia w/high LAP) impaired relaxation.     Her hospital records were reviewed in detail. She denied any chest pain and her mobility is restricted. I suspect that compliance with diet has been an issue and her glucose today was more than 300 according to her. She unfortunately missed her dialysis yesterday and is scheduled for one today.    Clinical exam did not reveal any signs of pulmonary congestion. Her weight has remained stable according to her    Allergies   Allergen Reactions   • Adhesive Tape Hives and Other (See Comments)   • Furosemide Other (See Comments)   • Latex Other (See Comments)   • Nsaids Hives   • Other      Bandaids, MRSA, SURECLOSE         Past Medical History:   Diagnosis Date   • Acute blood loss anemia 4/16/2017    Likely due to gastric oozing at this time. - Dr. Duarte (GI) was consulted and has now signed off, will follow up outpatient - pill colonoscopy showed AVMs - continue to monitor   • Acute cystitis with hematuria 3/31/2021    1/13: IV Rocephin 1 gm q 24 1/14 : transitioned  to omnicef 300mg. Urine cultures resulted and did not show growth. Omnicef discontinued as patient is asymptomatic   • Altered mental status 1/9/2022    - AMS on presentation - initial ABG pH 7.3, CO2 34 - Procal 0.29 - UA negative for acute cysitits -CTA head wnl  - Empiric Zosyn and Vancomycin -Lactate 2.5 on admission  - blood cultures no growth at 24 hours     • Anxiety    • CAD (coronary artery disease) 4/24/2021    S/P 3 stents 5/1/2021 for [facility] Continue ASA 81mg & Clopidogrel 75mg Continue Atorvastatin 40mg   • Carotid artery stenosis    • Chronic obstructive lung disease (HCC)    • CKD (chronic kidney disease) stage 4, GFR 15-29 ml/min ([facility])    • CKD (chronic kidney disease), symptom management only, stage 5 ([facility]) 10/5/2020    Results from last 7 days Lab Units 12/15/21 0548 12/14/21 1323 12/14/21 0916 CREATININE mg/dL 3.92* 3.21* 3.32*  Baseline  creatinine 2-3 GFR 13-25 GFR 15 Dialysis MWF, sees Dr. Lauren Nephrology consult,, appreciate recommendations Continue Bumex 1mg bid daily Holding Bumex 2mg 4 times a week   • Colonic polyp    • Coronary arteriosclerosis    • Diabetes mellitus (HCC)    • Diabetic neuropathy (HCC)    • Ear pain, right 10/18/2021    - canal trauma due to patient scratching and DMT2 - added cortisporin ear drops   • Elevated troponin 10/12/2021    -most likely from CKD -Trending down -Neg chest pain   • GERD (gastroesophageal reflux disease)    • GI bleed 5/13/2021    - GI will follow up outpatient - Protonix 40mg daily - Avoid medical DVT prophy and use mechanical at this time instead. - Continue to monitor - pill colonoscopy results showed AVMs   • History of transfusion    • Hypercholesterolemia    • Hypertension    • Hypomagnesemia 6/27/2021    Monitor and replace   • Morbid obesity ([facility])    • Nephrolithiasis    • Peripheral vascular disease ([facility])    • SIRS (systemic inflammatory response syndrome) ([facility]) 1/9/2022    Admission  - WBC 17.78   -   - RR 16 - 1/10: VSS/wnl - CXR - Mild pulm edema - Blood cultures no growth at 24 hours  - Procalcitonin 0.29 - UA : glucose 1000, negative Leucocytes/nitrate - Empiric Zosyn and Vancomcyin    • Sleep apnea    • Substance abuse ([facility])    • Vitamin D deficiency          Past Surgical History:   Procedure Laterality Date   • CARDIAC CATHETERIZATION N/A 7/14/2020   • CARDIAC CATHETERIZATION N/A 4/23/2021    Procedure: Left Heart Cath;  Surgeon: Melba Romo MD;  Location: [facility] CATH INVASIVE LOCATION;  Service: Cardiology;  Laterality: N/A;   • CARDIAC CATHETERIZATION N/A 4/30/2021    Procedure: Percutaneous Coronary Intervention;  Surgeon: Russell Voss MD;  Location: [facility] CATH INVASIVE LOCATION;  Service: Cardiovascular;  Laterality: N/A;   • CARDIAC CATHETERIZATION N/A 4/30/2021    Procedure: Stent NIKKI coronary;  Surgeon: Russell Voss MD;  Location: [facility]  CATH INVASIVE LOCATION;  Service: Cardiovascular;  Laterality: N/A;   • CARDIAC CATHETERIZATION Left 11/13/2021    Procedure: Left Heart Cath;  Surgeon: Niall Rios MD;  Location: [facility] CATH INVASIVE LOCATION;  Service: Cardiology;  Laterality: Left;   • CAROTID STENT Left    • COLONOSCOPY     • COLONOSCOPY N/A 5/14/2021    Procedure: COLONOSCOPY;  Surgeon: Mingo Duarte MD;  Location: [facility] ENDOSCOPY;  Service: Gastroenterology;  Laterality: N/A;   • CORONARY ARTERY BYPASS GRAFT N/A 2013    CABG X 3   • CYSTOSCOPY BLADDER STONE LITHOTRIPSY Bilateral    • ENDOSCOPY N/A 4/12/2021    Procedure: ESOPHAGOGASTRODUODENOSCOPY;  Surgeon: Mingo Duarte MD;  Location: [facility] ENDOSCOPY;  Service: Gastroenterology;  Laterality: N/A;   • ENDOSCOPY N/A 5/14/2021    Procedure: ESOPHAGOGASTRODUODENOSCOPY;  Surgeon: Mingo Duarte MD;  Location: [facility] ENDOSCOPY;  Service: Gastroenterology;  Laterality: N/A;   • ENDOSCOPY N/A 1/28/2022    Procedure: ESOPHAGOGASTRODUODENOSCOPY;  Surgeon: Mingo Duarte MD;  Location: [facility] ENDOSCOPY;  Service: Gastroenterology;  Laterality: N/A;   • INTERVENTIONAL RADIOLOGY PROCEDURE N/A 10/21/2021    Procedure: tunneled central venous catheter placement;  Surgeon: Donnie Robles MD;  Location: [facility] ANGIO INVASIVE LOCATION;  Service: Interventional Radiology;  Laterality: N/A;   • INTERVENTIONAL RADIOLOGY PROCEDURE N/A 1/27/2022    Procedure: tunneled central venous catheter placement;  Surgeon: Donnie Robles MD;  Location: [facility] ANGIO INVASIVE LOCATION;  Service: Interventional Radiology;  Laterality: N/A;         Family History   Problem Relation Age of Onset   • Heart disease Mother    • Lung cancer Mother    • Heart disease Father    • Heart attack Father    • Diabetes Father    • Heart disease Half-Sister         Dad's side   • Heart disease Brother    • No Known Problems Sister    • No Known Problems Sister    • No Known Problems Sister    • No  Known Problems Sister    • No Known Problems Sister    • Pancreatic cancer Half-Sister         Dad's side   • No Known Problems Brother    • No Known Problems Brother    • Heart attack Half-Brother    • Heart attack Half-Brother    • No Known Problems Maternal Grandmother    • No Known Problems Maternal Grandfather    • No Known Problems Paternal Grandmother    • No Known Problems Paternal Grandfather          Patient has history of tobacco use. She is currently a non-smoker. She has 11.5 pack years smoking history.  She did smoke quarter pack of cigarettes per day for 46 years.    Current Outpatient Medications   Medication Sig Dispense Refill   • acetaminophen (Tylenol 8 Hour) 650 MG 8 hr tablet Take 1 tablet by mouth Every 8 (Eight) Hours As Needed for Mild Pain . 90 tablet 0   • albuterol (PROVENTIL) (2.5 MG/3ML) 0.083% nebulizer solution Inhale the contents of 1 vial by nebulization Every 4 (Four) Hours As Needed for Wheezing. 75 mL 3   • albuterol sulfate  (90 Base) MCG/ACT inhaler Inhale 2 puffs Every 4 (Four) Hours As Needed for Wheezing. 18 g 1   • aspirin (aspirin) 81 MG EC tablet Take 1 tablet by mouth Daily. 60 tablet 5   • atorvastatin (LIPITOR) 20 MG tablet TAKE ONE TABLET BY MOUTH EVERY NIGHT AT BEDTIME 30 tablet 1   • Blood Glucose Monitoring Suppl ([employer] Blood Glucose Meter) w/Device kit 1 each 3 (Three) Times a Day. 1 kit 3   • bumetanide (BUMEX) 2 MG tablet Take 1 tablet by mouth 4 (Four) Times a Week. (Patient taking differently: Take 2 mg by mouth 4 (Four) Times a Week. Pt states she takes medication on Sunday, Monday, Wednesday and Friday.) 16 tablet 0   • clopidogrel (PLAVIX) 75 MG tablet Take 1 tablet by mouth Daily. 30 tablet 5   • Continuous Blood Gluc  (FreeStyle Jade 2 [place]) device 1 each Continuous. 1 each 0   • Continuous Blood Gluc Sensor (FreeStyle Jade 2 Sensor) misc 1 each Every 14 (Fourteen) Days. 2 each 11   • cyclobenzaprine (FLEXERIL) 5 MG tablet Take 10 mg by  "mouth.     • Easy Touch Insulin Syringe 30G X 5/16\" 0.5 ML misc USE AS DIRECTED WITH LEVEMIR     • EASY TOUCH PEN NEEDLES 31G X 8 MM misc      • ferrous sulfate (FeroSul) 325 (65 FE) MG tablet Take 1 tablet by mouth Daily With Breakfast. 30 tablet 3   • gabapentin (Neurontin) 800 MG tablet Take 1 tablet by mouth 3 (Three) Times a Day for 30 days. 90 tablet 0   • glucose blood test strip Use as instructed 100 each 12   • insulin aspart (novoLOG FLEXPEN) 100 UNIT/ML solution pen-injector sc pen Inject 30 units in addition to sliding scale on sheet of paper (0-24 Units) under the skin as directed 3 (three) times daily with meals. 45 mL 12   • insulin aspart (novoLOG FLEXPEN) 100 UNIT/ML solution pen-injector sc pen Inject 30 Units under the skin into the appropriate area as directed 3 (Three) Times a Day With Meals. 30 mL 12   • insulin detemir (LEVEMIR) 100 UNIT/ML injection Inject 30 Units under the skin into the appropriate area as directed Every morning and 35 units every night. 15 mL 12   • insulin glargine (LANTUS, SEMGLEE) 100 UNIT/ML injection Inject 20 Units under the skin into the appropriate area as directed.     • Insulin Pen Needle (NovoFine) 30G X 8 MM misc As directed 4 times daily 100 each 11   • Insulin Pen Needle 31G X 8 MM misc Use to inject insulin 4 (Four) Times a Day as directed. 120 each 12   • ipratropium-albuterol (DUO-NEB) 0.5-2.5 mg/3 ml nebulizer Take 3 mL by nebulization 4 (Four) Times a Day.     • isosorbide mononitrate (IMDUR) 30 MG 24 hr tablet TAKE 1 TABLET BY MOUTH EVERY MORNING. 30 tablet 0   • levoFLOXacin (LEVAQUIN) 250 MG tablet      • lidocaine (LIDODERM) 5 % APPLY TO THE AFFECTED AREA(S) TOPICALLY TWO TIMES A DAY. REMOVE NIGHTLY 30 patch 1   • lisinopril (PRINIVIL,ZESTRIL) 5 MG tablet Take 1 tablet by mouth Daily. 30 tablet 0   • montelukast (SINGULAIR) 10 MG tablet Take 1 tablet by mouth Every Night. 30 tablet 5   • muscle rub (BenGay) 10-15 % cream cream Apply 1 application " topically to the appropriate area as directed 4 (Four) Times a Day As Needed for buttock pain. 85 g 1   • nitroglycerin (NITROSTAT) 0.4 MG SL tablet Place 0.4 mg under the tongue Every 5 (Five) Minutes As Needed for Chest Pain (x 3 doses).     • nystatin (MYCOSTATIN) 731399 UNIT/GM powder Apply  topically to the appropriate area as directed 3 (Three) Times a Day. 15 g 1   • O2 (OXYGEN) Inhale 3 L/min Continuous.     • ondansetron ODT (Zofran ODT) 4 MG disintegrating tablet Place 1 tablet on the tongue Every 8 (Eight) Hours As Needed for Nausea or Vomiting. 30 tablet 0   • oxybutynin XL (DITROPAN-XL) 5 MG 24 hr tablet Take 1 tablet by mouth Daily. 90 tablet 1   • pantoprazole (PROTONIX) 40 MG EC tablet Take 1 tablet by mouth Every Night. 30 tablet 5   • ranolazine (RANEXA) 500 MG 12 hr tablet Take 1 tablet by mouth Every 12 (Twelve) Hours. 60 tablet 0   • sodium bicarbonate 650 MG tablet Take 1,300 mg by mouth.     • epoetin hubert-epbx (RETACRIT) 00857 UNIT/ML injection Inject 1 mL under the skin into the appropriate area as directed 3 (Three) Times a Week. Indications: ESRD on Dialysis 6.6 mL 0   • levothyroxine (SYNTHROID, LEVOTHROID) 25 MCG tablet Take 25 mcg by mouth Daily.     • metoprolol tartrate (LOPRESSOR) 25 MG tablet Take 1 tablet by mouth Every 12 (Twelve) Hours. 60 tablet 3   • Mirabegron ER (Myrbetriq) 25 MG tablet sustained-release 24 hour 24 hr tablet Take 25 mg by mouth Daily.     • potassium chloride (MICRO-K) 10 MEQ CR capsule Take 10 mEq by mouth Daily.     • promethazine (PHENERGAN) 25 MG tablet Take 25 mg by mouth Every 6 (Six) Hours As Needed.     • sennosides-docusate (PERICOLACE) 8.6-50 MG per tablet Take 2 tablets by mouth 2 (Two) Times a Day. 60 tablet 2   • sevelamer (RENVELA) 800 MG tablet Take 800 mg by mouth 3 (Three) Times a Day With Meals.     • [START ON 10/10/2022] tuberculin (Tubersol) 5 UNIT/0.1ML injection Inject 0.1 mL into the appropriate area of the skin as directed by provider  "Daily.       No current facility-administered medications for this visit.         OBJECTIVE    /70 (BP Location: Left arm, Patient Position: Sitting, Cuff Size: Adult)   Pulse 65   Temp 97.8 °F (36.6 °C)   Ht 157.5 cm (62\")   Wt 128 kg (282 lb)   LMP  (LMP Unknown)   SpO2 100%   BMI 51.58 kg/m²         Review of Systems: The following systems are reviewed and changes noted as indicated under history of present illness    Constitutional:  Denies recent weight loss, weight gain, fever or chills     HENT:  Denies any hearing loss, epistaxis, hoarseness, or difficulty speaking.     Eyes: Wears eyeglasses or contact lenses     Respiratory:  Dyspnea with exertion, no cough, wheezing, or hemoptysis.     Cardiovascular: Negative for palpitations, chest pain    Gastrointestinal:  Denies change in bowel habits, dyspepsia, ulcer disease, hematochezia, or melena.     Endocrine: Diabetes not under control    Genitourinary: Negative.      Neurological:  Denies any history of recurrent headaches, strokes, TIA, or seizure disorder.     Hematological: Denies any food allergies, seasonal allergies, bleeding disorders, or lymphadenopathy.     Psychiatric/Behavioral: Denies any history of depression, substance abuse, or change in cognitive function.       Physical Exam: The following systems reviewed and changes noted as indicated below.    Constitutional: Cooperative, alert and oriented, in no acute distress.  Morbidly obese with a BMI of 51.6    HENT:   Head: Normocephalic, normal hair patterns, no masses or tenderness.  Ears, Nose, and Throat: No gross abnormalities. No pallor or cyanosis.   Eyes: EOMS intact, PERRL, conjunctivae and lids unremarkable. Fundoscopic exam and visual fields not performed.   Neck: No palpable masses or adenopathy, no thyromegaly, no JVD, carotid pulses are full and equal bilaterally and without bruit.     Cardiovascular: Regular rhythm, S1 and S2 normal, no S3 or S4.  No murmurs, gallops, or " rubs detected.     Pulmonary/Chest: Chest: normal symmetry, normal respiratory excursion, no use of accessory muscles.     Pulmonary: Normal breath sounds. No rales or rhonchi.    Abdominal: Abdomen soft, bowel sounds normoactive, no masses, no hepatosplenomegaly, nontender, no bruit.     Musculoskeletal: No deformities, clubbing, cyanosis, erythema, or edema observed.    Neurological: No gross motor or sensory deficits noted, affect appropriate, oriented to time, person, place.     Psychiatric: She has a normal mood and affect. Her behavior is normal. Judgment and thought content normal.         Procedures      Lab Results   Component Value Date    WBC 6.45 02/01/2022    HGB 8.4 (L) 02/01/2022    HCT 26.1 (L) 02/01/2022    MCV 87.9 02/01/2022     02/01/2022     Lab Results   Component Value Date    GLUCOSE 271 (H) 01/29/2022    BUN 32 (H) 01/29/2022    CREATININE 3.34 (H) 01/29/2022    EGFRIFNONA 14 (L) 01/29/2022    EGFRIFAFRI  01/29/2022      Comment:      <15 Indicative of kidney failure.    BCR 9.6 01/29/2022    CO2 27.0 01/29/2022    CALCIUM 8.9 01/29/2022    ALBUMIN 3.30 (L) 01/29/2022    AST 9 01/29/2022    ALT 6 01/29/2022     Lab Results   Component Value Date    CHOL 130 06/02/2021    CHOL 168 06/24/2020    CHOL 121 04/04/2018     Lab Results   Component Value Date    TRIG 338 (H) 06/02/2021    TRIG 388 (H) 06/24/2020    TRIG 203 (H) 04/04/2018     Lab Results   Component Value Date    HDL 39 (L) 06/02/2021    HDL 43 06/24/2020    HDL 35 (L) 04/04/2018     No components found for: LDLCALC  Lab Results   Component Value Date    LDL 41 06/02/2021    LDL 47 06/24/2020    LDL 47 04/04/2018     No results found for: HDLLDLRATIO  No components found for: CHOLHDL  Lab Results   Component Value Date    HGBA1C 8.30 (H) 02/01/2022     Lab Results   Component Value Date    TSH 2.140 02/01/2022    THYROIDAB 12 09/24/2014           ASSESSMENT AND PLAN  Yamileth Slater is a 61-year-old female with multiple medical  issues as discussed in detail under history of present illness with recent hospitalization both at [facility] and [facility].   Fortunately she is stable from a cardiac standpoint with no clinical evidence of angina or congestive heart failure at this time. Her heart rate and blood pressure are in the normal range.  As described above, compliance with diet has been an issue and she has had recurrent episodes of hyperglycemia including DKA and HHS. Unfortunately she missed her dialysis yesterday and is scheduled for one today.    After reviewing her medicines I have continued antiplatelet therapy with aspirin and Plavix, lipid-lowering therapy with atorvastatin, antihypertensive therapy with lisinopril, antianginal therapy with isosorbide mononitrate and Ranexa have been continued. Her diuretics are being adjusted by Dr. Lauren.      Diagnoses and all orders for this visit:    1. Coronary artery disease involving native coronary artery of native heart without angina pectoris (Primary)    2. Primary hypertension    3. ESRD on hemodialysis ([facility])    4. Mixed hyperlipidemia    5. Morbid obesity with BMI of 50.0-59.9, adult ([facility])    6. Type 2 diabetes mellitus with chronic kidney disease on chronic dialysis, with long-term current use of insulin ([facility])        Patient's Body mass index is 51.58 kg/m². indicating that she is morbidly obese (BMI > 40 or > 35 with obesity - related health condition). Obesity-related health conditions include the following: obstructive sleep apnea, hypertension, coronary heart disease, diabetes mellitus, dyslipidemias and peripheral vascular disease. Obesity is unchanged. BMI is is above average; BMI management plan is completed. We discussed portion control and increasing exercise..      Yamileth Nga Slater  reports that she quit smoking about 5 years ago. Her smoking use included cigarettes. She started smoking about 23 years ago. She has a 11.50 pack-year  smoking history. She has never used smokeless tobacco.           Margarito Davis MD  2/15/2022  09:09 CST

## 2022-02-15 NOTE — OUTREACH NOTE
Medical Week 3 Survey      Responses   [facility] [place] patient discharged from? [place]   Does the patient have one of the following disease processes/diagnoses(primary or secondary)? Other   Week 3 attempt successful? 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 call rescheduled.           Dayna Mathew RN

## 2022-02-16 ENCOUNTER — READMISSION MANAGEMENT (OUTPATIENT)
Dept: CALL CENTER | Facility: HOSPITAL | Age: 62
End: 2022-02-16

## 2022-02-16 NOTE — OUTREACH NOTE
Medical Week 3 Survey      Responses   [facility] patient discharged from? [place]   Does the patient have one of the following disease processes/diagnoses(primary or secondary)? Other   Week 3 attempt successful? Yes   Call start time 1430   Call end time 1431   Meds reviewed with patient/caregiver? Yes   Is the patient taking all medications as directed (includes completed medication regime)? Yes   Comments regarding appointments At her dialysis apt currently    Has the patient kept scheduled appointments due by today? Yes   What is the patient's perception of their health status since discharge? Improving   Week 3 Call Completed? Yes          Jennifer Delgado RN

## 2022-02-17 ENCOUNTER — HOME CARE VISIT (OUTPATIENT)
Dept: HOME HEALTH SERVICES | Facility: CLINIC | Age: 62
End: 2022-02-17

## 2022-02-17 VITALS
DIASTOLIC BLOOD PRESSURE: 64 MMHG | SYSTOLIC BLOOD PRESSURE: 136 MMHG | RESPIRATION RATE: 18 BRPM | TEMPERATURE: 97.4 F | HEART RATE: 63 BPM | OXYGEN SATURATION: 99 %

## 2022-02-17 PROCEDURE — G0158 HHC OT ASSISTANT EA 15: HCPCS

## 2022-02-18 NOTE — CASE COMMUNICATION
OCCUPATIONAL THERAPY EVALUATION:    Patient is a 62 y/o female with several hospitalizations over the past 4-5 months: [facility] 10/11/21-10/28/21 for GI bleed and s/p transfusion. She transferred to [facility] 10/28/21-11/8/21 for rehab services then transferred back to [facility] 11/8/21-11/15/21 with complaints of chest pain and was treated for penumonia, acute on chronic CHF, and s/p cardiac stenting. She then was readmitted t o [facility] 12/12/21 with complaints of no electricity for O2/trilogy and was treated for hyponamtremia, chronic respiratory failure with hypoxia. [facility] 1/9/22-1/20/22 after falling and treated for SIRS, diabetic acidosis, acute cystitis with hematuria, and AMS.       PRIOR VS CURRENT LEVEL   At Eval  Ambulation: contact guard assist   Bathing: moderate assist   Toileting: contact guard assist, minimum assist   Upper body dressing: contact guar d assist   Lower body dressing: minimum assist, moderate assist   Feeding: independent   Grooming: contact guard assist   Physical transfers: contact guard assist, minimum assist      At D/C -  BUE strength/endurance progressing with HEP.  Patient independent with ADLs bathing, toileting, dressing, grooming, and T/Fs.  Lives with family who completes meals, driving, and homemaking.  DISCHARGE STATUS     TO SELF    DISCHARGE CONDITION    GOOD     DISCHARGE REASON    reached max rehab potential/Goals MET  MD VISIT:  unknown  INSTRUCTIONS HOME PROGRAM PROVIDED TO:  patient  PATIENT CAREGIVER LEVEL OF COMPLIANCE:  good  UNMET NEEDS   N/A  SERVICES CONTINUING  COMMUNICATION / CARE COORDINATION:  none  HHACN/MEDICARE 2 DAY NOTICE GIVEN:  no  PATIENT AND CAREGIVER (NAME) ARE AGREEABLE WITH DISCHARGE PLAN   yes

## 2022-02-22 DIAGNOSIS — R04.2 HEMOPTYSIS: ICD-10-CM

## 2022-02-22 DIAGNOSIS — N18.4 CKD (CHRONIC KIDNEY DISEASE) STAGE 4, GFR 15-29 ML/MIN: ICD-10-CM

## 2022-02-22 DIAGNOSIS — N18.6 ESRD ON HEMODIALYSIS: Primary | ICD-10-CM

## 2022-02-22 DIAGNOSIS — Z99.2 ESRD ON HEMODIALYSIS: Primary | ICD-10-CM

## 2022-02-22 DIAGNOSIS — I77.0 A-V FISTULA: ICD-10-CM

## 2022-02-23 DIAGNOSIS — N18.5 ANEMIA DUE TO STAGE 5 CHRONIC KIDNEY DISEASE, NOT ON CHRONIC DIALYSIS: Primary | ICD-10-CM

## 2022-02-23 DIAGNOSIS — D63.1 ANEMIA DUE TO STAGE 5 CHRONIC KIDNEY DISEASE, NOT ON CHRONIC DIALYSIS: Primary | ICD-10-CM

## 2022-02-23 RX ORDER — SODIUM CHLORIDE 9 MG/ML
250 INJECTION, SOLUTION INTRAVENOUS AS NEEDED
Status: CANCELLED | OUTPATIENT
Start: 2022-02-23

## 2022-02-24 ENCOUNTER — READMISSION MANAGEMENT (OUTPATIENT)
Dept: CALL CENTER | Facility: HOSPITAL | Age: 62
End: 2022-02-24

## 2022-02-24 ENCOUNTER — HOME CARE VISIT (OUTPATIENT)
Dept: HOME HEALTH SERVICES | Facility: CLINIC | Age: 62
End: 2022-02-24

## 2022-02-24 ENCOUNTER — APPOINTMENT (OUTPATIENT)
Dept: GENERAL RADIOLOGY | Facility: HOSPITAL | Age: 62
End: 2022-02-24

## 2022-02-24 ENCOUNTER — APPOINTMENT (OUTPATIENT)
Dept: ONCOLOGY | Facility: HOSPITAL | Age: 62
End: 2022-02-24

## 2022-02-24 ENCOUNTER — HOME CARE VISIT (OUTPATIENT)
Dept: HOME HEALTH SERVICES | Facility: HOME HEALTHCARE | Age: 62
End: 2022-02-24

## 2022-02-24 ENCOUNTER — HOSPITAL ENCOUNTER (OUTPATIENT)
Facility: HOSPITAL | Age: 62
Setting detail: OBSERVATION
Discharge: HOME HEALTH | End: 2022-02-26
Attending: STUDENT IN AN ORGANIZED HEALTH CARE EDUCATION/TRAINING PROGRAM | Admitting: FAMILY MEDICINE

## 2022-02-24 DIAGNOSIS — R06.02 SOB (SHORTNESS OF BREATH): ICD-10-CM

## 2022-02-24 DIAGNOSIS — E11.49 NEUROLOGIC DISORDER ASSOCIATED WITH DIABETES MELLITUS: ICD-10-CM

## 2022-02-24 DIAGNOSIS — D64.9 ANEMIA, UNSPECIFIED TYPE: Primary | ICD-10-CM

## 2022-02-24 LAB
ABO GROUP BLD: NORMAL
ALBUMIN SERPL-MCNC: 3.5 G/DL (ref 3.5–5.2)
ALBUMIN/GLOB SERPL: 1 G/DL
ALP SERPL-CCNC: 172 U/L (ref 39–117)
ALT SERPL W P-5'-P-CCNC: 8 U/L (ref 1–33)
ANION GAP SERPL CALCULATED.3IONS-SCNC: 15 MMOL/L (ref 5–15)
AST SERPL-CCNC: 12 U/L (ref 1–32)
BASOPHILS # BLD AUTO: 0.05 10*3/MM3 (ref 0–0.2)
BASOPHILS NFR BLD AUTO: 0.7 % (ref 0–1.5)
BILIRUB SERPL-MCNC: 0.3 MG/DL (ref 0–1.2)
BLD GP AB SCN SERPL QL: NEGATIVE
BUN SERPL-MCNC: 42 MG/DL (ref 8–23)
BUN/CREAT SERPL: 10.7 (ref 7–25)
CALCIUM SPEC-SCNC: 8.7 MG/DL (ref 8.6–10.5)
CHLORIDE SERPL-SCNC: 94 MMOL/L (ref 98–107)
CO2 SERPL-SCNC: 27 MMOL/L (ref 22–29)
CREAT SERPL-MCNC: 3.94 MG/DL (ref 0.57–1)
DEPRECATED RDW RBC AUTO: 55.7 FL (ref 37–54)
EOSINOPHIL # BLD AUTO: 0.19 10*3/MM3 (ref 0–0.4)
EOSINOPHIL NFR BLD AUTO: 2.8 % (ref 0.3–6.2)
ERYTHROCYTE [DISTWIDTH] IN BLOOD BY AUTOMATED COUNT: 17.2 % (ref 12.3–15.4)
FLUAV RNA RESP QL NAA+PROBE: NOT DETECTED
FLUBV RNA RESP QL NAA+PROBE: NOT DETECTED
GFR SERPL CREATININE-BSD FRML MDRD: 12 ML/MIN/1.73
GLOBULIN UR ELPH-MCNC: 3.6 GM/DL
GLUCOSE BLDC GLUCOMTR-MCNC: 282 MG/DL (ref 70–130)
GLUCOSE BLDC GLUCOMTR-MCNC: 330 MG/DL (ref 70–130)
GLUCOSE SERPL-MCNC: 229 MG/DL (ref 65–99)
HCT VFR BLD AUTO: 20.6 % (ref 34–46.6)
HGB BLD-MCNC: 6.6 G/DL (ref 12–15.9)
HOLD SPECIMEN: NORMAL
IMM GRANULOCYTES # BLD AUTO: 0.05 10*3/MM3 (ref 0–0.05)
IMM GRANULOCYTES NFR BLD AUTO: 0.7 % (ref 0–0.5)
LYMPHOCYTES # BLD AUTO: 1.75 10*3/MM3 (ref 0.7–3.1)
LYMPHOCYTES NFR BLD AUTO: 25.7 % (ref 19.6–45.3)
Lab: NORMAL
MCH RBC QN AUTO: 29.3 PG (ref 26.6–33)
MCHC RBC AUTO-ENTMCNC: 32 G/DL (ref 31.5–35.7)
MCV RBC AUTO: 91.6 FL (ref 79–97)
MONOCYTES # BLD AUTO: 0.47 10*3/MM3 (ref 0.1–0.9)
MONOCYTES NFR BLD AUTO: 6.9 % (ref 5–12)
NEUTROPHILS NFR BLD AUTO: 4.29 10*3/MM3 (ref 1.7–7)
NEUTROPHILS NFR BLD AUTO: 63.2 % (ref 42.7–76)
NRBC BLD AUTO-RTO: 0 /100 WBC (ref 0–0.2)
PLATELET # BLD AUTO: 229 10*3/MM3 (ref 140–450)
PMV BLD AUTO: 9.5 FL (ref 6–12)
POTASSIUM SERPL-SCNC: 3.5 MMOL/L (ref 3.5–5.2)
PROT SERPL-MCNC: 7.1 G/DL (ref 6–8.5)
QT INTERVAL: 518 MS
QTC INTERVAL: 555 MS
RBC # BLD AUTO: 2.25 10*6/MM3 (ref 3.77–5.28)
RH BLD: POSITIVE
SARS-COV-2 RNA RESP QL NAA+PROBE: NOT DETECTED
SODIUM SERPL-SCNC: 136 MMOL/L (ref 136–145)
T&S EXPIRATION DATE: NORMAL
WBC NRBC COR # BLD: 6.8 10*3/MM3 (ref 3.4–10.8)

## 2022-02-24 PROCEDURE — 36430 TRANSFUSION BLD/BLD COMPNT: CPT

## 2022-02-24 PROCEDURE — 71045 X-RAY EXAM CHEST 1 VIEW: CPT

## 2022-02-24 PROCEDURE — 87636 SARSCOV2 & INF A&B AMP PRB: CPT | Performed by: STUDENT IN AN ORGANIZED HEALTH CARE EDUCATION/TRAINING PROGRAM

## 2022-02-24 PROCEDURE — G0378 HOSPITAL OBSERVATION PER HR: HCPCS

## 2022-02-24 PROCEDURE — 85025 COMPLETE CBC W/AUTO DIFF WBC: CPT | Performed by: STUDENT IN AN ORGANIZED HEALTH CARE EDUCATION/TRAINING PROGRAM

## 2022-02-24 PROCEDURE — 93005 ELECTROCARDIOGRAM TRACING: CPT | Performed by: STUDENT IN AN ORGANIZED HEALTH CARE EDUCATION/TRAINING PROGRAM

## 2022-02-24 PROCEDURE — 86900 BLOOD TYPING SEROLOGIC ABO: CPT | Performed by: STUDENT IN AN ORGANIZED HEALTH CARE EDUCATION/TRAINING PROGRAM

## 2022-02-24 PROCEDURE — 99220 PR INITIAL OBSERVATION CARE/DAY 70 MINUTES: CPT | Performed by: FAMILY MEDICINE

## 2022-02-24 PROCEDURE — 80053 COMPREHEN METABOLIC PANEL: CPT | Performed by: STUDENT IN AN ORGANIZED HEALTH CARE EDUCATION/TRAINING PROGRAM

## 2022-02-24 PROCEDURE — 86923 COMPATIBILITY TEST ELECTRIC: CPT

## 2022-02-24 PROCEDURE — 94660 CPAP INITIATION&MGMT: CPT

## 2022-02-24 PROCEDURE — 93010 ELECTROCARDIOGRAM REPORT: CPT | Performed by: INTERNAL MEDICINE

## 2022-02-24 PROCEDURE — P9016 RBC LEUKOCYTES REDUCED: HCPCS

## 2022-02-24 PROCEDURE — 86900 BLOOD TYPING SEROLOGIC ABO: CPT

## 2022-02-24 PROCEDURE — 99285 EMERGENCY DEPT VISIT HI MDM: CPT

## 2022-02-24 PROCEDURE — 82962 GLUCOSE BLOOD TEST: CPT

## 2022-02-24 PROCEDURE — 63710000001 INSULIN REGULAR HUMAN PER 5 UNITS: Performed by: FAMILY MEDICINE

## 2022-02-24 PROCEDURE — 94799 UNLISTED PULMONARY SVC/PX: CPT

## 2022-02-24 PROCEDURE — 86901 BLOOD TYPING SEROLOGIC RH(D): CPT | Performed by: STUDENT IN AN ORGANIZED HEALTH CARE EDUCATION/TRAINING PROGRAM

## 2022-02-24 PROCEDURE — 86850 RBC ANTIBODY SCREEN: CPT | Performed by: STUDENT IN AN ORGANIZED HEALTH CARE EDUCATION/TRAINING PROGRAM

## 2022-02-24 PROCEDURE — C9803 HOPD COVID-19 SPEC COLLECT: HCPCS

## 2022-02-24 PROCEDURE — 63710000001 INSULIN DETEMIR PER 5 UNITS: Performed by: FAMILY MEDICINE

## 2022-02-24 PROCEDURE — 94640 AIRWAY INHALATION TREATMENT: CPT

## 2022-02-24 RX ORDER — LEVOTHYROXINE SODIUM 0.03 MG/1
25 TABLET ORAL
Status: DISCONTINUED | OUTPATIENT
Start: 2022-02-25 | End: 2022-02-26 | Stop reason: HOSPADM

## 2022-02-24 RX ORDER — NICOTINE POLACRILEX 4 MG
15 LOZENGE BUCCAL
Status: DISCONTINUED | OUTPATIENT
Start: 2022-02-24 | End: 2022-02-26 | Stop reason: HOSPADM

## 2022-02-24 RX ORDER — BISACODYL 10 MG
10 SUPPOSITORY, RECTAL RECTAL DAILY PRN
Status: DISCONTINUED | OUTPATIENT
Start: 2022-02-24 | End: 2022-02-26 | Stop reason: HOSPADM

## 2022-02-24 RX ORDER — SODIUM CHLORIDE 0.9 % (FLUSH) 0.9 %
10 SYRINGE (ML) INJECTION EVERY 12 HOURS SCHEDULED
Status: DISCONTINUED | OUTPATIENT
Start: 2022-02-24 | End: 2022-02-26 | Stop reason: HOSPADM

## 2022-02-24 RX ORDER — BISACODYL 5 MG/1
5 TABLET, DELAYED RELEASE ORAL DAILY PRN
Status: DISCONTINUED | OUTPATIENT
Start: 2022-02-24 | End: 2022-02-26 | Stop reason: HOSPADM

## 2022-02-24 RX ORDER — ALBUTEROL SULFATE 90 UG/1
2 AEROSOL, METERED RESPIRATORY (INHALATION) EVERY 4 HOURS PRN
Status: DISCONTINUED | OUTPATIENT
Start: 2022-02-24 | End: 2022-02-24

## 2022-02-24 RX ORDER — ASPIRIN 81 MG/1
81 TABLET ORAL DAILY
Status: DISCONTINUED | OUTPATIENT
Start: 2022-02-25 | End: 2022-02-26 | Stop reason: HOSPADM

## 2022-02-24 RX ORDER — PANTOPRAZOLE SODIUM 40 MG/1
40 TABLET, DELAYED RELEASE ORAL NIGHTLY
Status: DISCONTINUED | OUTPATIENT
Start: 2022-02-24 | End: 2022-02-26 | Stop reason: HOSPADM

## 2022-02-24 RX ORDER — IPRATROPIUM BROMIDE AND ALBUTEROL SULFATE 2.5; .5 MG/3ML; MG/3ML
3 SOLUTION RESPIRATORY (INHALATION)
Status: DISCONTINUED | OUTPATIENT
Start: 2022-02-24 | End: 2022-02-26 | Stop reason: HOSPADM

## 2022-02-24 RX ORDER — ATORVASTATIN CALCIUM 20 MG/1
20 TABLET, FILM COATED ORAL NIGHTLY
Status: DISCONTINUED | OUTPATIENT
Start: 2022-02-24 | End: 2022-02-26 | Stop reason: HOSPADM

## 2022-02-24 RX ORDER — DEXTROSE MONOHYDRATE 25 G/50ML
25 INJECTION, SOLUTION INTRAVENOUS
Status: DISCONTINUED | OUTPATIENT
Start: 2022-02-24 | End: 2022-02-26 | Stop reason: HOSPADM

## 2022-02-24 RX ORDER — ISOSORBIDE MONONITRATE 30 MG/1
30 TABLET, EXTENDED RELEASE ORAL EVERY MORNING
Status: DISCONTINUED | OUTPATIENT
Start: 2022-02-25 | End: 2022-02-26 | Stop reason: HOSPADM

## 2022-02-24 RX ORDER — PROMETHAZINE HYDROCHLORIDE 25 MG/1
25 TABLET ORAL EVERY 6 HOURS PRN
Qty: 30 TABLET | Refills: 0 | Status: SHIPPED | OUTPATIENT
Start: 2022-02-24 | End: 2022-02-26 | Stop reason: HOSPADM

## 2022-02-24 RX ORDER — SODIUM CHLORIDE 0.9 % (FLUSH) 0.9 %
10 SYRINGE (ML) INJECTION AS NEEDED
Status: DISCONTINUED | OUTPATIENT
Start: 2022-02-24 | End: 2022-02-26 | Stop reason: HOSPADM

## 2022-02-24 RX ORDER — LISINOPRIL 5 MG/1
5 TABLET ORAL DAILY
Status: DISCONTINUED | OUTPATIENT
Start: 2022-02-25 | End: 2022-02-26 | Stop reason: HOSPADM

## 2022-02-24 RX ORDER — ISOSORBIDE MONONITRATE 30 MG/1
30 TABLET, EXTENDED RELEASE ORAL EVERY MORNING
Qty: 30 TABLET | Refills: 0 | Status: SHIPPED | OUTPATIENT
Start: 2022-02-24 | End: 2022-03-03 | Stop reason: SDUPTHER

## 2022-02-24 RX ORDER — POLYETHYLENE GLYCOL 3350 17 G/17G
17 POWDER, FOR SOLUTION ORAL DAILY PRN
Status: DISCONTINUED | OUTPATIENT
Start: 2022-02-24 | End: 2022-02-26 | Stop reason: HOSPADM

## 2022-02-24 RX ORDER — METOPROLOL TARTRATE 50 MG/1
TABLET, FILM COATED ORAL
Qty: 60 TABLET | Refills: 1 | OUTPATIENT
Start: 2022-02-24

## 2022-02-24 RX ORDER — SODIUM BICARBONATE 650 MG/1
1300 TABLET ORAL DAILY
Qty: 60 TABLET | Refills: 0 | Status: SHIPPED | OUTPATIENT
Start: 2022-02-24 | End: 2022-03-17 | Stop reason: HOSPADM

## 2022-02-24 RX ORDER — CLOPIDOGREL BISULFATE 75 MG/1
75 TABLET ORAL DAILY
Status: DISCONTINUED | OUTPATIENT
Start: 2022-02-25 | End: 2022-02-26 | Stop reason: HOSPADM

## 2022-02-24 RX ORDER — NYSTATIN 100000 [USP'U]/G
POWDER TOPICAL 3 TIMES DAILY
Status: DISCONTINUED | OUTPATIENT
Start: 2022-02-24 | End: 2022-02-26 | Stop reason: HOSPADM

## 2022-02-24 RX ORDER — ACETAMINOPHEN 325 MG/1
650 TABLET ORAL EVERY 4 HOURS PRN
Status: DISCONTINUED | OUTPATIENT
Start: 2022-02-24 | End: 2022-02-26 | Stop reason: HOSPADM

## 2022-02-24 RX ORDER — GABAPENTIN 800 MG/1
800 TABLET ORAL 3 TIMES DAILY
Qty: 90 TABLET | Refills: 0 | Status: CANCELLED | OUTPATIENT
Start: 2022-02-24 | End: 2022-03-26

## 2022-02-24 RX ORDER — AMOXICILLIN 250 MG
2 CAPSULE ORAL 2 TIMES DAILY
Status: DISCONTINUED | OUTPATIENT
Start: 2022-02-24 | End: 2022-02-26 | Stop reason: HOSPADM

## 2022-02-24 RX ORDER — NITROGLYCERIN 0.4 MG/1
0.4 TABLET SUBLINGUAL
Status: DISCONTINUED | OUTPATIENT
Start: 2022-02-24 | End: 2022-02-26 | Stop reason: HOSPADM

## 2022-02-24 RX ORDER — MONTELUKAST SODIUM 10 MG/1
10 TABLET ORAL NIGHTLY
Status: DISCONTINUED | OUTPATIENT
Start: 2022-02-24 | End: 2022-02-26 | Stop reason: HOSPADM

## 2022-02-24 RX ORDER — ONDANSETRON 2 MG/ML
4 INJECTION INTRAMUSCULAR; INTRAVENOUS EVERY 6 HOURS PRN
Status: DISCONTINUED | OUTPATIENT
Start: 2022-02-24 | End: 2022-02-26 | Stop reason: HOSPADM

## 2022-02-24 RX ORDER — FERROUS SULFATE TAB EC 324 MG (65 MG FE EQUIVALENT) 324 (65 FE) MG
324 TABLET DELAYED RESPONSE ORAL
Status: DISCONTINUED | OUTPATIENT
Start: 2022-02-25 | End: 2022-02-26 | Stop reason: HOSPADM

## 2022-02-24 RX ORDER — AMOXICILLIN 250 MG
2 CAPSULE ORAL 2 TIMES DAILY PRN
Status: DISCONTINUED | OUTPATIENT
Start: 2022-02-24 | End: 2022-02-24

## 2022-02-24 RX ORDER — OXYBUTYNIN CHLORIDE 5 MG/1
5 TABLET, EXTENDED RELEASE ORAL DAILY
Status: DISCONTINUED | OUTPATIENT
Start: 2022-02-25 | End: 2022-02-26 | Stop reason: HOSPADM

## 2022-02-24 RX ORDER — GABAPENTIN 400 MG/1
800 CAPSULE ORAL EVERY 8 HOURS SCHEDULED
Status: DISCONTINUED | OUTPATIENT
Start: 2022-02-24 | End: 2022-02-26 | Stop reason: HOSPADM

## 2022-02-24 RX ORDER — ONDANSETRON 4 MG/1
4 TABLET, FILM COATED ORAL EVERY 6 HOURS PRN
Status: DISCONTINUED | OUTPATIENT
Start: 2022-02-24 | End: 2022-02-26 | Stop reason: HOSPADM

## 2022-02-24 RX ORDER — RANOLAZINE 500 MG/1
500 TABLET, EXTENDED RELEASE ORAL EVERY 12 HOURS SCHEDULED
Status: DISCONTINUED | OUTPATIENT
Start: 2022-02-24 | End: 2022-02-26 | Stop reason: HOSPADM

## 2022-02-24 RX ORDER — ALBUTEROL SULFATE 2.5 MG/3ML
2.5 SOLUTION RESPIRATORY (INHALATION) EVERY 4 HOURS PRN
Status: DISCONTINUED | OUTPATIENT
Start: 2022-02-24 | End: 2022-02-26 | Stop reason: HOSPADM

## 2022-02-24 RX ORDER — ACETAMINOPHEN 160 MG/5ML
650 SOLUTION ORAL EVERY 4 HOURS PRN
Status: DISCONTINUED | OUTPATIENT
Start: 2022-02-24 | End: 2022-02-24

## 2022-02-24 RX ORDER — ACETAMINOPHEN 650 MG/1
650 SUPPOSITORY RECTAL EVERY 4 HOURS PRN
Status: DISCONTINUED | OUTPATIENT
Start: 2022-02-24 | End: 2022-02-26 | Stop reason: HOSPADM

## 2022-02-24 RX ADMIN — IPRATROPIUM BROMIDE AND ALBUTEROL SULFATE 3 ML: 2.5; .5 SOLUTION RESPIRATORY (INHALATION) at 15:58

## 2022-02-24 RX ADMIN — IPRATROPIUM BROMIDE AND ALBUTEROL SULFATE 3 ML: 2.5; .5 SOLUTION RESPIRATORY (INHALATION) at 20:22

## 2022-02-24 RX ADMIN — GABAPENTIN 800 MG: 400 CAPSULE ORAL at 21:00

## 2022-02-24 RX ADMIN — METOPROLOL TARTRATE 25 MG: 25 TABLET, FILM COATED ORAL at 21:01

## 2022-02-24 RX ADMIN — GABAPENTIN 800 MG: 400 CAPSULE ORAL at 16:22

## 2022-02-24 RX ADMIN — ATORVASTATIN CALCIUM 20 MG: 20 TABLET, FILM COATED ORAL at 21:01

## 2022-02-24 RX ADMIN — Medication 10 ML: at 21:17

## 2022-02-24 RX ADMIN — INSULIN DETEMIR 30 UNITS: 100 INJECTION, SOLUTION SUBCUTANEOUS at 21:15

## 2022-02-24 RX ADMIN — DOCUSATE SODIUM 50 MG AND SENNOSIDES 8.6 MG 2 TABLET: 8.6; 5 TABLET, FILM COATED ORAL at 21:01

## 2022-02-24 RX ADMIN — MONTELUKAST SODIUM 10 MG: 10 TABLET, COATED ORAL at 21:01

## 2022-02-24 RX ADMIN — HUMAN INSULIN 8 UNITS: 100 INJECTION, SOLUTION SUBCUTANEOUS at 18:06

## 2022-02-24 RX ADMIN — RANOLAZINE 500 MG: 500 TABLET, FILM COATED, EXTENDED RELEASE ORAL at 21:01

## 2022-02-24 NOTE — OUTREACH NOTE
Medical Week 4 Survey      Responses   [facility] patient discharged from? [place]   Does the patient have one of the following disease processes/diagnoses(primary or secondary)? Other   Week 4 attempt successful? No   Rescheduled Rescheduled-pt requested  [Pt in ED receiving blood transfusion.]          Nery Guzmán RN

## 2022-02-24 NOTE — TELEPHONE ENCOUNTER
Incoming Refill Request      Medication requested (name and dose): gabapentin (Neurontin) 800 MG tablet, SYMBALTA 20 MG, isosorbide mononitrate (IMDUR) 30 MG 24 hr tablet, sodium bicarbonate 650 MG tablet, promethazine (PHENERGAN) 25 MG tablet    Pharmacy where request should be sent: [facility]    Additional details provided by patient:     Best call back number: [phone]    Does the patient have less than a 3 day supply:  [x] Yes  [] No    Elaina Griffin Workman  02/24/22, 09:50 CST

## 2022-02-24 NOTE — TELEPHONE ENCOUNTER
Called and talked with the pharmacy and they have the refills for titi. But pt can't pick them up until the 1st of march.

## 2022-02-25 ENCOUNTER — HOME CARE VISIT (OUTPATIENT)
Dept: HOME HEALTH SERVICES | Facility: HOME HEALTHCARE | Age: 62
End: 2022-02-25

## 2022-02-25 LAB
ALBUMIN SERPL-MCNC: 3.6 G/DL (ref 3.5–5.2)
ALBUMIN/GLOB SERPL: 1 G/DL
ALP SERPL-CCNC: 164 U/L (ref 39–117)
ALT SERPL W P-5'-P-CCNC: 9 U/L (ref 1–33)
ANION GAP SERPL CALCULATED.3IONS-SCNC: 18 MMOL/L (ref 5–15)
AST SERPL-CCNC: 16 U/L (ref 1–32)
BASOPHILS # BLD AUTO: 0.07 10*3/MM3 (ref 0–0.2)
BASOPHILS NFR BLD AUTO: 0.9 % (ref 0–1.5)
BILIRUB SERPL-MCNC: 0.3 MG/DL (ref 0–1.2)
BUN SERPL-MCNC: 54 MG/DL (ref 8–23)
BUN/CREAT SERPL: 12.4 (ref 7–25)
CALCIUM SPEC-SCNC: 8.6 MG/DL (ref 8.6–10.5)
CHLORIDE SERPL-SCNC: 92 MMOL/L (ref 98–107)
CO2 SERPL-SCNC: 24 MMOL/L (ref 22–29)
CREAT SERPL-MCNC: 4.36 MG/DL (ref 0.57–1)
DEPRECATED RDW RBC AUTO: 53.5 FL (ref 37–54)
EOSINOPHIL # BLD AUTO: 0.21 10*3/MM3 (ref 0–0.4)
EOSINOPHIL NFR BLD AUTO: 2.7 % (ref 0.3–6.2)
ERYTHROCYTE [DISTWIDTH] IN BLOOD BY AUTOMATED COUNT: 16.4 % (ref 12.3–15.4)
GFR SERPL CREATININE-BSD FRML MDRD: 10 ML/MIN/1.73
GLOBULIN UR ELPH-MCNC: 3.5 GM/DL
GLUCOSE BLDC GLUCOMTR-MCNC: 176 MG/DL (ref 70–130)
GLUCOSE BLDC GLUCOMTR-MCNC: 240 MG/DL (ref 70–130)
GLUCOSE BLDC GLUCOMTR-MCNC: 306 MG/DL (ref 70–130)
GLUCOSE BLDC GLUCOMTR-MCNC: 331 MG/DL (ref 70–130)
GLUCOSE SERPL-MCNC: 303 MG/DL (ref 65–99)
HCT VFR BLD AUTO: 22.7 % (ref 34–46.6)
HGB BLD-MCNC: 7.3 G/DL (ref 12–15.9)
IMM GRANULOCYTES # BLD AUTO: 0.06 10*3/MM3 (ref 0–0.05)
IMM GRANULOCYTES NFR BLD AUTO: 0.8 % (ref 0–0.5)
LYMPHOCYTES # BLD AUTO: 1.41 10*3/MM3 (ref 0.7–3.1)
LYMPHOCYTES NFR BLD AUTO: 18.4 % (ref 19.6–45.3)
MCH RBC QN AUTO: 29.4 PG (ref 26.6–33)
MCHC RBC AUTO-ENTMCNC: 32.2 G/DL (ref 31.5–35.7)
MCV RBC AUTO: 91.5 FL (ref 79–97)
MONOCYTES # BLD AUTO: 0.39 10*3/MM3 (ref 0.1–0.9)
MONOCYTES NFR BLD AUTO: 5.1 % (ref 5–12)
NEUTROPHILS NFR BLD AUTO: 5.53 10*3/MM3 (ref 1.7–7)
NEUTROPHILS NFR BLD AUTO: 72.1 % (ref 42.7–76)
NRBC BLD AUTO-RTO: 0 /100 WBC (ref 0–0.2)
PLATELET # BLD AUTO: 214 10*3/MM3 (ref 140–450)
PMV BLD AUTO: 9.5 FL (ref 6–12)
POTASSIUM SERPL-SCNC: 4.1 MMOL/L (ref 3.5–5.2)
PROT SERPL-MCNC: 7.1 G/DL (ref 6–8.5)
RBC # BLD AUTO: 2.48 10*6/MM3 (ref 3.77–5.28)
SODIUM SERPL-SCNC: 134 MMOL/L (ref 136–145)
WBC NRBC COR # BLD: 7.67 10*3/MM3 (ref 3.4–10.8)

## 2022-02-25 PROCEDURE — 82962 GLUCOSE BLOOD TEST: CPT

## 2022-02-25 PROCEDURE — 94664 DEMO&/EVAL PT USE INHALER: CPT

## 2022-02-25 PROCEDURE — 36415 COLL VENOUS BLD VENIPUNCTURE: CPT | Performed by: FAMILY MEDICINE

## 2022-02-25 PROCEDURE — 94799 UNLISTED PULMONARY SVC/PX: CPT

## 2022-02-25 PROCEDURE — 94760 N-INVAS EAR/PLS OXIMETRY 1: CPT

## 2022-02-25 PROCEDURE — 99226 PR SBSQ OBSERVATION CARE/DAY 35 MINUTES: CPT | Performed by: FAMILY MEDICINE

## 2022-02-25 PROCEDURE — 94660 CPAP INITIATION&MGMT: CPT

## 2022-02-25 PROCEDURE — 25010000002 HEPARIN (PORCINE) PER 1000 UNITS: Performed by: INTERNAL MEDICINE

## 2022-02-25 PROCEDURE — 80053 COMPREHEN METABOLIC PANEL: CPT | Performed by: FAMILY MEDICINE

## 2022-02-25 PROCEDURE — 85025 COMPLETE CBC W/AUTO DIFF WBC: CPT | Performed by: FAMILY MEDICINE

## 2022-02-25 PROCEDURE — G0257 UNSCHED DIALYSIS ESRD PT HOS: HCPCS

## 2022-02-25 PROCEDURE — 63710000001 INSULIN ASPART PER 5 UNITS: Performed by: STUDENT IN AN ORGANIZED HEALTH CARE EDUCATION/TRAINING PROGRAM

## 2022-02-25 PROCEDURE — G0378 HOSPITAL OBSERVATION PER HR: HCPCS

## 2022-02-25 PROCEDURE — 63710000001 INSULIN DETEMIR PER 5 UNITS: Performed by: FAMILY MEDICINE

## 2022-02-25 RX ORDER — CARBOXYMETHYLCELLULOSE SODIUM 5 MG/ML
2 SOLUTION/ DROPS OPHTHALMIC 3 TIMES DAILY PRN
Status: DISCONTINUED | OUTPATIENT
Start: 2022-02-25 | End: 2022-02-26 | Stop reason: HOSPADM

## 2022-02-25 RX ORDER — HEPARIN SODIUM 1000 [USP'U]/ML
4000 INJECTION, SOLUTION INTRAVENOUS; SUBCUTANEOUS AS NEEDED
Status: DISCONTINUED | OUTPATIENT
Start: 2022-02-25 | End: 2022-02-26 | Stop reason: HOSPADM

## 2022-02-25 RX ORDER — GABAPENTIN 800 MG/1
800 TABLET ORAL 3 TIMES DAILY
Qty: 90 TABLET | Refills: 0 | Status: SHIPPED | OUTPATIENT
Start: 2022-03-01 | End: 2022-02-25 | Stop reason: HOSPADM

## 2022-02-25 RX ORDER — DULOXETIN HYDROCHLORIDE 20 MG/1
20 CAPSULE, DELAYED RELEASE ORAL DAILY
Qty: 30 CAPSULE | Refills: 0 | Status: SHIPPED | OUTPATIENT
Start: 2022-02-25 | End: 2022-03-03 | Stop reason: SDUPTHER

## 2022-02-25 RX ORDER — ALBUMIN (HUMAN) 12.5 G/50ML
12.5 SOLUTION INTRAVENOUS AS NEEDED
Status: ACTIVE | OUTPATIENT
Start: 2022-02-25 | End: 2022-02-25

## 2022-02-25 RX ADMIN — METOPROLOL TARTRATE 25 MG: 25 TABLET, FILM COATED ORAL at 21:52

## 2022-02-25 RX ADMIN — INSULIN ASPART 10 UNITS: 100 INJECTION, SOLUTION INTRAVENOUS; SUBCUTANEOUS at 08:10

## 2022-02-25 RX ADMIN — ATORVASTATIN CALCIUM 20 MG: 20 TABLET, FILM COATED ORAL at 21:52

## 2022-02-25 RX ADMIN — GABAPENTIN 800 MG: 400 CAPSULE ORAL at 21:52

## 2022-02-25 RX ADMIN — Medication 10 ML: at 21:53

## 2022-02-25 RX ADMIN — Medication 10 ML: at 08:09

## 2022-02-25 RX ADMIN — INSULIN ASPART 5 UNITS: 100 INJECTION, SOLUTION INTRAVENOUS; SUBCUTANEOUS at 17:50

## 2022-02-25 RX ADMIN — GABAPENTIN 800 MG: 400 CAPSULE ORAL at 14:03

## 2022-02-25 RX ADMIN — ASPIRIN 81 MG: 81 TABLET, FILM COATED ORAL at 08:09

## 2022-02-25 RX ADMIN — ACETAMINOPHEN 650 MG: 325 TABLET, FILM COATED ORAL at 21:51

## 2022-02-25 RX ADMIN — OXYBUTYNIN CHLORIDE 5 MG: 5 TABLET, EXTENDED RELEASE ORAL at 08:09

## 2022-02-25 RX ADMIN — INSULIN ASPART 12 UNITS: 100 INJECTION, SOLUTION INTRAVENOUS; SUBCUTANEOUS at 17:50

## 2022-02-25 RX ADMIN — INSULIN ASPART 10 UNITS: 100 INJECTION, SOLUTION INTRAVENOUS; SUBCUTANEOUS at 11:14

## 2022-02-25 RX ADMIN — RANOLAZINE 500 MG: 500 TABLET, FILM COATED, EXTENDED RELEASE ORAL at 08:09

## 2022-02-25 RX ADMIN — MONTELUKAST SODIUM 10 MG: 10 TABLET, COATED ORAL at 21:51

## 2022-02-25 RX ADMIN — LEVOTHYROXINE SODIUM 25 MCG: 25 TABLET ORAL at 06:04

## 2022-02-25 RX ADMIN — FERROUS SULFATE TAB EC 324 MG (65 MG FE EQUIVALENT) 324 MG: 324 (65 FE) TABLET DELAYED RESPONSE at 08:09

## 2022-02-25 RX ADMIN — INSULIN ASPART 12 UNITS: 100 INJECTION, SOLUTION INTRAVENOUS; SUBCUTANEOUS at 11:14

## 2022-02-25 RX ADMIN — INSULIN ASPART 12 UNITS: 100 INJECTION, SOLUTION INTRAVENOUS; SUBCUTANEOUS at 08:10

## 2022-02-25 RX ADMIN — RANOLAZINE 500 MG: 500 TABLET, FILM COATED, EXTENDED RELEASE ORAL at 21:52

## 2022-02-25 RX ADMIN — IPRATROPIUM BROMIDE AND ALBUTEROL SULFATE 3 ML: 2.5; .5 SOLUTION RESPIRATORY (INHALATION) at 10:43

## 2022-02-25 RX ADMIN — GABAPENTIN 800 MG: 400 CAPSULE ORAL at 06:04

## 2022-02-25 RX ADMIN — INSULIN DETEMIR 35 UNITS: 100 INJECTION, SOLUTION SUBCUTANEOUS at 21:53

## 2022-02-25 RX ADMIN — ISOSORBIDE MONONITRATE 30 MG: 30 TABLET, EXTENDED RELEASE ORAL at 06:05

## 2022-02-25 RX ADMIN — IPRATROPIUM BROMIDE AND ALBUTEROL SULFATE 3 ML: 2.5; .5 SOLUTION RESPIRATORY (INHALATION) at 07:12

## 2022-02-25 RX ADMIN — CLOPIDOGREL BISULFATE 75 MG: 75 TABLET ORAL at 08:09

## 2022-02-25 RX ADMIN — HEPARIN SODIUM 4000 UNITS: 1000 INJECTION INTRAVENOUS; SUBCUTANEOUS at 18:39

## 2022-02-25 RX ADMIN — IPRATROPIUM BROMIDE AND ALBUTEROL SULFATE 3 ML: 2.5; .5 SOLUTION RESPIRATORY (INHALATION) at 14:56

## 2022-02-25 RX ADMIN — LISINOPRIL 5 MG: 5 TABLET ORAL at 08:09

## 2022-02-25 RX ADMIN — DOCUSATE SODIUM 50 MG AND SENNOSIDES 8.6 MG 2 TABLET: 8.6; 5 TABLET, FILM COATED ORAL at 08:09

## 2022-02-25 RX ADMIN — PANTOPRAZOLE SODIUM 40 MG: 40 TABLET, DELAYED RELEASE ORAL at 21:52

## 2022-02-25 RX ADMIN — METOPROLOL TARTRATE 25 MG: 25 TABLET, FILM COATED ORAL at 08:09

## 2022-02-26 ENCOUNTER — READMISSION MANAGEMENT (OUTPATIENT)
Dept: CALL CENTER | Facility: HOSPITAL | Age: 62
End: 2022-02-26

## 2022-02-26 VITALS
SYSTOLIC BLOOD PRESSURE: 122 MMHG | DIASTOLIC BLOOD PRESSURE: 72 MMHG | HEIGHT: 62 IN | WEIGHT: 293 LBS | BODY MASS INDEX: 53.92 KG/M2 | TEMPERATURE: 97.3 F | OXYGEN SATURATION: 99 % | HEART RATE: 70 BPM | RESPIRATION RATE: 18 BRPM

## 2022-02-26 PROBLEM — D64.9 SYMPTOMATIC ANEMIA: Status: RESOLVED | Noted: 2022-01-26 | Resolved: 2022-02-26

## 2022-02-26 LAB
ALBUMIN SERPL-MCNC: 3.5 G/DL (ref 3.5–5.2)
ALBUMIN/GLOB SERPL: 0.9 G/DL
ALP SERPL-CCNC: 160 U/L (ref 39–117)
ALT SERPL W P-5'-P-CCNC: 8 U/L (ref 1–33)
ANION GAP SERPL CALCULATED.3IONS-SCNC: 15 MMOL/L (ref 5–15)
AST SERPL-CCNC: 12 U/L (ref 1–32)
BASOPHILS # BLD AUTO: 0.06 10*3/MM3 (ref 0–0.2)
BASOPHILS NFR BLD AUTO: 0.9 % (ref 0–1.5)
BH BB BLOOD EXPIRATION DATE: NORMAL
BH BB BLOOD TYPE BARCODE: NORMAL
BH BB DISPENSE STATUS: NORMAL
BH BB PRODUCT CODE: NORMAL
BH BB UNIT NUMBER: NORMAL
BILIRUB SERPL-MCNC: 0.4 MG/DL (ref 0–1.2)
BUN SERPL-MCNC: 31 MG/DL (ref 8–23)
BUN/CREAT SERPL: 9.8 (ref 7–25)
CALCIUM SPEC-SCNC: 9.3 MG/DL (ref 8.6–10.5)
CHLORIDE SERPL-SCNC: 94 MMOL/L (ref 98–107)
CO2 SERPL-SCNC: 23 MMOL/L (ref 22–29)
CREAT SERPL-MCNC: 3.17 MG/DL (ref 0.57–1)
CROSSMATCH INTERPRETATION: NORMAL
DEPRECATED RDW RBC AUTO: 50.4 FL (ref 37–54)
EOSINOPHIL # BLD AUTO: 0.24 10*3/MM3 (ref 0–0.4)
EOSINOPHIL NFR BLD AUTO: 3.5 % (ref 0.3–6.2)
ERYTHROCYTE [DISTWIDTH] IN BLOOD BY AUTOMATED COUNT: 16.1 % (ref 12.3–15.4)
GFR SERPL CREATININE-BSD FRML MDRD: 15 ML/MIN/1.73
GLOBULIN UR ELPH-MCNC: 4 GM/DL
GLUCOSE BLDC GLUCOMTR-MCNC: 276 MG/DL (ref 70–130)
GLUCOSE BLDC GLUCOMTR-MCNC: 282 MG/DL (ref 70–130)
GLUCOSE SERPL-MCNC: 242 MG/DL (ref 65–99)
HCT VFR BLD AUTO: 25 % (ref 34–46.6)
HGB BLD-MCNC: 8 G/DL (ref 12–15.9)
IMM GRANULOCYTES # BLD AUTO: 0.05 10*3/MM3 (ref 0–0.05)
IMM GRANULOCYTES NFR BLD AUTO: 0.7 % (ref 0–0.5)
LYMPHOCYTES # BLD AUTO: 1.56 10*3/MM3 (ref 0.7–3.1)
LYMPHOCYTES NFR BLD AUTO: 22.4 % (ref 19.6–45.3)
MCH RBC QN AUTO: 27.9 PG (ref 26.6–33)
MCHC RBC AUTO-ENTMCNC: 32 G/DL (ref 31.5–35.7)
MCV RBC AUTO: 87.1 FL (ref 79–97)
MONOCYTES # BLD AUTO: 0.4 10*3/MM3 (ref 0.1–0.9)
MONOCYTES NFR BLD AUTO: 5.8 % (ref 5–12)
NEUTROPHILS NFR BLD AUTO: 4.64 10*3/MM3 (ref 1.7–7)
NEUTROPHILS NFR BLD AUTO: 66.7 % (ref 42.7–76)
NRBC BLD AUTO-RTO: 0 /100 WBC (ref 0–0.2)
PLATELET # BLD AUTO: 217 10*3/MM3 (ref 140–450)
PMV BLD AUTO: 9 FL (ref 6–12)
POTASSIUM SERPL-SCNC: 3.8 MMOL/L (ref 3.5–5.2)
PROT SERPL-MCNC: 7.5 G/DL (ref 6–8.5)
RBC # BLD AUTO: 2.87 10*6/MM3 (ref 3.77–5.28)
SODIUM SERPL-SCNC: 132 MMOL/L (ref 136–145)
UNIT  ABO: NORMAL
UNIT  RH: NORMAL
WBC NRBC COR # BLD: 6.95 10*3/MM3 (ref 3.4–10.8)

## 2022-02-26 PROCEDURE — 36415 COLL VENOUS BLD VENIPUNCTURE: CPT | Performed by: FAMILY MEDICINE

## 2022-02-26 PROCEDURE — 99217 PR OBSERVATION CARE DISCHARGE MANAGEMENT: CPT | Performed by: FAMILY MEDICINE

## 2022-02-26 PROCEDURE — 94799 UNLISTED PULMONARY SVC/PX: CPT

## 2022-02-26 PROCEDURE — 63710000001 INSULIN DETEMIR PER 5 UNITS: Performed by: FAMILY MEDICINE

## 2022-02-26 PROCEDURE — G0378 HOSPITAL OBSERVATION PER HR: HCPCS

## 2022-02-26 PROCEDURE — 63710000001 INSULIN ASPART PER 5 UNITS: Performed by: STUDENT IN AN ORGANIZED HEALTH CARE EDUCATION/TRAINING PROGRAM

## 2022-02-26 PROCEDURE — 94760 N-INVAS EAR/PLS OXIMETRY 1: CPT

## 2022-02-26 PROCEDURE — 94660 CPAP INITIATION&MGMT: CPT

## 2022-02-26 PROCEDURE — 80053 COMPREHEN METABOLIC PANEL: CPT | Performed by: FAMILY MEDICINE

## 2022-02-26 PROCEDURE — 85025 COMPLETE CBC W/AUTO DIFF WBC: CPT | Performed by: FAMILY MEDICINE

## 2022-02-26 PROCEDURE — 82962 GLUCOSE BLOOD TEST: CPT

## 2022-02-26 RX ADMIN — GABAPENTIN 800 MG: 400 CAPSULE ORAL at 06:07

## 2022-02-26 RX ADMIN — LEVOTHYROXINE SODIUM 25 MCG: 25 TABLET ORAL at 06:07

## 2022-02-26 RX ADMIN — METOPROLOL TARTRATE 25 MG: 25 TABLET, FILM COATED ORAL at 08:10

## 2022-02-26 RX ADMIN — ISOSORBIDE MONONITRATE 30 MG: 30 TABLET, EXTENDED RELEASE ORAL at 06:07

## 2022-02-26 RX ADMIN — IPRATROPIUM BROMIDE AND ALBUTEROL SULFATE 3 ML: 2.5; .5 SOLUTION RESPIRATORY (INHALATION) at 07:27

## 2022-02-26 RX ADMIN — INSULIN ASPART 8 UNITS: 100 INJECTION, SOLUTION INTRAVENOUS; SUBCUTANEOUS at 08:14

## 2022-02-26 RX ADMIN — RANOLAZINE 500 MG: 500 TABLET, FILM COATED, EXTENDED RELEASE ORAL at 08:10

## 2022-02-26 RX ADMIN — ASPIRIN 81 MG: 81 TABLET, FILM COATED ORAL at 08:10

## 2022-02-26 RX ADMIN — INSULIN ASPART 12 UNITS: 100 INJECTION, SOLUTION INTRAVENOUS; SUBCUTANEOUS at 08:15

## 2022-02-26 RX ADMIN — FERROUS SULFATE TAB EC 324 MG (65 MG FE EQUIVALENT) 324 MG: 324 (65 FE) TABLET DELAYED RESPONSE at 08:10

## 2022-02-26 RX ADMIN — NYSTATIN: 100000 POWDER TOPICAL at 03:04

## 2022-02-26 RX ADMIN — LISINOPRIL 5 MG: 5 TABLET ORAL at 08:10

## 2022-02-26 RX ADMIN — INSULIN DETEMIR 30 UNITS: 100 INJECTION, SOLUTION SUBCUTANEOUS at 08:17

## 2022-02-26 RX ADMIN — OXYBUTYNIN CHLORIDE 5 MG: 5 TABLET, EXTENDED RELEASE ORAL at 08:10

## 2022-02-26 RX ADMIN — CLOPIDOGREL BISULFATE 75 MG: 75 TABLET ORAL at 08:10

## 2022-02-26 RX ADMIN — Medication 10 ML: at 08:15

## 2022-02-26 NOTE — OUTREACH NOTE
Prep Survey      Responses   [facility] facility patient discharged from? [place]   Is LACE score < 7 ? No   Emergency Room discharge w/ pulse ox? No   Eligibility [facility] [place]   Date of Admission 02/24/22   Date of Discharge 02/26/22   Discharge Disposition Home or Self Care   Discharge diagnosis anemia due to chronic kidney disease   Does the patient have one of the following disease processes/diagnoses(primary or secondary)? Other   Does the patient have Home health ordered? Yes   What is the Home health agency?  [facility]   Is there a DME ordered? No   Prep survey completed? Yes          Marybel Hsu RN

## 2022-02-28 ENCOUNTER — TRANSITIONAL CARE MANAGEMENT TELEPHONE ENCOUNTER (OUTPATIENT)
Dept: CALL CENTER | Facility: HOSPITAL | Age: 62
End: 2022-02-28

## 2022-02-28 NOTE — OUTREACH NOTE
Call Center TCM Note      Responses   [facility] patient discharged from? [place]   Does the patient have one of the following disease processes/diagnoses(primary or secondary)? Other   TCM attempt successful? No  [No verbal release. ]   Unsuccessful attempts Attempt 1          Marivel Barrera RN    2/28/2022, 14:49 EST

## 2022-02-28 NOTE — OUTREACH NOTE
Call Center TCM Note      Responses   [facility] [place] patient discharged from? [place]   Does the patient have one of the following disease processes/diagnoses(primary or secondary)? Other   TCM attempt successful? No   Unsuccessful attempts Attempt 2   Does the patient have a primary care provider?  Yes   Does the patient have an appointment with their PCP within 7 days of discharge? Yes   Comments regarding PCP HOSP DC FU appt 3/3/@ 3:45 pm.           Marivel Barrera RN    2/28/2022, 15:34 EST

## 2022-03-01 ENCOUNTER — TRANSITIONAL CARE MANAGEMENT TELEPHONE ENCOUNTER (OUTPATIENT)
Dept: CALL CENTER | Facility: HOSPITAL | Age: 62
End: 2022-03-01

## 2022-03-01 NOTE — OUTREACH NOTE
Call Center TCM Note      Responses   [facility] patient discharged from? [place]   Does the patient have one of the following disease processes/diagnoses(primary or secondary)? Other   TCM attempt successful? No  [ verbal release]   Unsuccessful attempts Attempt 3  [Attempted pt and ]          Roma Lopez, RN    3/1/2022, 10:42 EST

## 2022-03-03 ENCOUNTER — HOME CARE VISIT (OUTPATIENT)
Dept: HOME HEALTH SERVICES | Facility: CLINIC | Age: 62
End: 2022-03-03

## 2022-03-03 ENCOUNTER — OFFICE VISIT (OUTPATIENT)
Dept: FAMILY MEDICINE CLINIC | Facility: CLINIC | Age: 62
End: 2022-03-03

## 2022-03-03 VITALS
HEART RATE: 75 BPM | OXYGEN SATURATION: 98 % | TEMPERATURE: 97.8 F | WEIGHT: 293 LBS | BODY MASS INDEX: 53.92 KG/M2 | HEIGHT: 62 IN | DIASTOLIC BLOOD PRESSURE: 80 MMHG | SYSTOLIC BLOOD PRESSURE: 130 MMHG

## 2022-03-03 DIAGNOSIS — E11.40 TYPE 2 DIABETES MELLITUS WITH DIABETIC NEUROPATHY, UNSPECIFIED WHETHER LONG TERM INSULIN USE: ICD-10-CM

## 2022-03-03 DIAGNOSIS — Z99.2 ANEMIA DUE TO CHRONIC KIDNEY DISEASE, ON CHRONIC DIALYSIS: Primary | ICD-10-CM

## 2022-03-03 DIAGNOSIS — N18.6 ANEMIA DUE TO CHRONIC KIDNEY DISEASE, ON CHRONIC DIALYSIS: Primary | ICD-10-CM

## 2022-03-03 DIAGNOSIS — I10 PRIMARY HYPERTENSION: ICD-10-CM

## 2022-03-03 DIAGNOSIS — D63.1 ANEMIA DUE TO CHRONIC KIDNEY DISEASE, ON CHRONIC DIALYSIS: Primary | ICD-10-CM

## 2022-03-03 DIAGNOSIS — E78.2 MIXED HYPERLIPIDEMIA: ICD-10-CM

## 2022-03-03 PROCEDURE — 1111F DSCHRG MED/CURRENT MED MERGE: CPT | Performed by: STUDENT IN AN ORGANIZED HEALTH CARE EDUCATION/TRAINING PROGRAM

## 2022-03-03 PROCEDURE — G0151 HHCP-SERV OF PT,EA 15 MIN: HCPCS

## 2022-03-03 PROCEDURE — 99213 OFFICE O/P EST LOW 20 MIN: CPT | Performed by: STUDENT IN AN ORGANIZED HEALTH CARE EDUCATION/TRAINING PROGRAM

## 2022-03-03 RX ORDER — DULOXETIN HYDROCHLORIDE 20 MG/1
20 CAPSULE, DELAYED RELEASE ORAL DAILY
Qty: 90 CAPSULE | Refills: 1 | Status: SHIPPED | OUTPATIENT
Start: 2022-03-03 | End: 2022-04-26 | Stop reason: SDUPTHER

## 2022-03-03 RX ORDER — ATORVASTATIN CALCIUM 20 MG/1
20 TABLET, FILM COATED ORAL
Qty: 90 TABLET | Refills: 1 | Status: SHIPPED | OUTPATIENT
Start: 2022-03-03 | End: 2022-04-26 | Stop reason: SDUPTHER

## 2022-03-03 RX ORDER — LISINOPRIL 5 MG/1
5 TABLET ORAL DAILY
Qty: 90 TABLET | Refills: 1 | Status: SHIPPED | OUTPATIENT
Start: 2022-03-03 | End: 2022-04-26

## 2022-03-03 RX ORDER — ISOSORBIDE MONONITRATE 30 MG/1
30 TABLET, EXTENDED RELEASE ORAL EVERY MORNING
Qty: 90 TABLET | Refills: 1 | Status: SHIPPED | OUTPATIENT
Start: 2022-03-03 | End: 2022-10-18

## 2022-03-03 NOTE — PROGRESS NOTES
Family Medicine Residency  Rianna Macias MD    Subjective:     Yamileth Slater is a 61 y.o. female with CMH of HTN, mixed hyperlipidemia, neuropathic pain and CKD secondary to DM type 2 who presents for follow up after being admitted to the hospital due to anemia. Patient reports that she feels ok. She denies any chest pain, sob. Dizziness. Her last hb was 8. She currently attends dialysis M/W/F and reports bilateral ankle pain after her sessions which gets better with Voltaren cream. No other concerns today.    The following portions of the patient's history were reviewed and updated as appropriate: allergies, current medications, past family history, past medical history, past social history, past surgical history and problem list.    Past Medical Hx:  Past Medical History:   Diagnosis Date   • Acute blood loss anemia 4/16/2017    Likely due to gastric oozing at this time. - Dr. Duarte (GI) was consulted and has now signed off, will follow up outpatient - pill colonoscopy showed AVMs - continue to monitor   • Acute cystitis with hematuria 3/31/2021    1/13: IV Rocephin 1 gm q 24 1/14 : transitioned  to omnicef 300mg. Urine cultures resulted and did not show growth. Omnicef discontinued as patient is asymptomatic   • Altered mental status 1/9/2022    - AMS on presentation - initial ABG pH 7.3, CO2 34 - Procal 0.29 - UA negative for acute cysitits -CTA head wnl  - Empiric Zosyn and Vancomycin -Lactate 2.5 on admission  - blood cultures no growth at 24 hours     • Anxiety    • CAD (coronary artery disease) 4/24/2021    S/P 3 stents 5/1/2021 for [facility] Continue ASA 81mg & Clopidogrel 75mg Continue Atorvastatin 40mg   • Carotid artery stenosis    • Chronic obstructive lung disease (HCC)    • CKD (chronic kidney disease) stage 4, GFR 15-29 ml/min (HCC)    • CKD (chronic kidney disease), symptom management only, stage 5 (HCC) 10/5/2020    Results from last 7 days Lab Units 12/15/21 0548 12/14/21 1323 12/14/21 0916  CREATININE mg/dL 3.92* 3.21* 3.32*  Baseline creatinine 2-3 GFR 13-25 GFR 15 Dialysis MWF, sees Dr. Lauren Nephrology consult,, appreciate recommendations Continue Bumex 1mg bid daily Holding Bumex 2mg 4 times a week   • Colonic polyp    • Coronary arteriosclerosis    • Diabetes mellitus (HCC)    • Diabetic neuropathy ([facility])    • Ear pain, right 10/18/2021    - canal trauma due to patient scratching and DMT2 - added cortisporin ear drops   • Elevated troponin 10/12/2021    -most likely from CKD -Trending down -Neg chest pain   • GERD (gastroesophageal reflux disease)    • GI bleed 5/13/2021    - GI will follow up outpatient - Protonix 40mg daily - Avoid medical DVT prophy and use mechanical at this time instead. - Continue to monitor - pill colonoscopy results showed AVMs   • History of transfusion    • Hypercholesterolemia    • Hypertension    • Hypomagnesemia 6/27/2021    Monitor and replace   • Morbid obesity ([facility])    • Nephrolithiasis    • Peripheral vascular disease ([facility])    • SIRS (systemic inflammatory response syndrome) ([facility]) 1/9/2022    Admission  - WBC 17.78   -   - RR 16 - 1/10: VSS/wnl - CXR - Mild pulm edema - Blood cultures no growth at 24 hours  - Procalcitonin 0.29 - UA : glucose 1000, negative Leucocytes/nitrate - Empiric Zosyn and Vancomcyin    • Sleep apnea    • Substance abuse ([facility])    • Vitamin D deficiency        Past Surgical Hx:  Past Surgical History:   Procedure Laterality Date   • CARDIAC CATHETERIZATION N/A 7/14/2020   • CARDIAC CATHETERIZATION N/A 4/23/2021    Procedure: Left Heart Cath;  Surgeon: Melba Romo MD;  Location: [facility] CATH INVASIVE LOCATION;  Service: Cardiology;  Laterality: N/A;   • CARDIAC CATHETERIZATION N/A 4/30/2021    Procedure: Percutaneous Coronary Intervention;  Surgeon: Russell Voss MD;  Location: [facility] INVASIVE LOCATION;  Service: Cardiovascular;  Laterality: N/A;   • CARDIAC CATHETERIZATION N/A 4/30/2021    Procedure: Stent NIKKI  coronary;  Surgeon: Russell Voss MD;  Location: [facility] CATH INVASIVE LOCATION;  Service: Cardiovascular;  Laterality: N/A;   • CARDIAC CATHETERIZATION Left 11/13/2021    Procedure: Left Heart Cath;  Surgeon: Niall Rios MD;  Location: [facility] CATH INVASIVE LOCATION;  Service: Cardiology;  Laterality: Left;   • CAROTID STENT Left    • COLONOSCOPY     • COLONOSCOPY N/A 5/14/2021    Procedure: COLONOSCOPY;  Surgeon: Mingo Duarte MD;  Location: [facility] ENDOSCOPY;  Service: Gastroenterology;  Laterality: N/A;   • CORONARY ARTERY BYPASS GRAFT N/A 2013    CABG X 3   • CYSTOSCOPY BLADDER STONE LITHOTRIPSY Bilateral    • ENDOSCOPY N/A 4/12/2021    Procedure: ESOPHAGOGASTRODUODENOSCOPY;  Surgeon: Mingo Duarte MD;  Location: [facility] ENDOSCOPY;  Service: Gastroenterology;  Laterality: N/A;   • ENDOSCOPY N/A 5/14/2021    Procedure: ESOPHAGOGASTRODUODENOSCOPY;  Surgeon: Mingo Duarte MD;  Location: [facility] ENDOSCOPY;  Service: Gastroenterology;  Laterality: N/A;   • ENDOSCOPY N/A 1/28/2022    Procedure: ESOPHAGOGASTRODUODENOSCOPY;  Surgeon: Mingo Duarte MD;  Location: [facility] ENDOSCOPY;  Service: Gastroenterology;  Laterality: N/A;   • INTERVENTIONAL RADIOLOGY PROCEDURE N/A 10/21/2021    Procedure: tunneled central venous catheter placement;  Surgeon: Donnie Robles MD;  Location: [facility] ANGIO INVASIVE LOCATION;  Service: Interventional Radiology;  Laterality: N/A;   • INTERVENTIONAL RADIOLOGY PROCEDURE N/A 1/27/2022    Procedure: tunneled central venous catheter placement;  Surgeon: Donnie Robles MD;  Location: [facility] ANGIO INVASIVE LOCATION;  Service: Interventional Radiology;  Laterality: N/A;       Current Meds:    Current Outpatient Medications:   •  acetaminophen (Tylenol 8 Hour) 650 MG 8 hr tablet, Take 1 tablet by mouth Every 8 (Eight) Hours As Needed for Mild Pain ., Disp: 90 tablet, Rfl: 0  •  albuterol (PROVENTIL) (2.5 MG/3ML) 0.083% nebulizer solution, Inhale the  "contents of 1 vial by nebulization Every 4 (Four) Hours As Needed for Wheezing., Disp: 75 mL, Rfl: 3  •  albuterol sulfate  (90 Base) MCG/ACT inhaler, Inhale 2 puffs Every 4 (Four) Hours As Needed for Wheezing., Disp: 18 g, Rfl: 1  •  aspirin (aspirin) 81 MG EC tablet, Take 1 tablet by mouth Daily., Disp: 60 tablet, Rfl: 5  •  atorvastatin (LIPITOR) 20 MG tablet, Take 1 tablet by mouth every night at bedtime., Disp: 90 tablet, Rfl: 1  •  Blood Glucose Monitoring Suppl ([employer] Blood Glucose Meter) w/Device kit, 1 each 3 (Three) Times a Day., Disp: 1 kit, Rfl: 3  •  bumetanide (BUMEX) 2 MG tablet, Take 1 tablet by mouth 4 (Four) Times a Week. (Patient taking differently: Take 2 mg by mouth 4 (Four) Times a Week. Pt states she takes medication on Sunday, Monday, Wednesday and Friday.), Disp: 16 tablet, Rfl: 0  •  clopidogrel (PLAVIX) 75 MG tablet, Take 1 tablet by mouth Daily., Disp: 30 tablet, Rfl: 5  •  Continuous Blood Gluc  (FreeStyle Jade 2 [place]) device, 1 each Continuous., Disp: 1 each, Rfl: 0  •  Continuous Blood Gluc Sensor (FreeStyle Jade 2 Sensor) misc, 1 each Every 14 (Fourteen) Days., Disp: 2 each, Rfl: 11  •  cyclobenzaprine (FLEXERIL) 5 MG tablet, Take 10 mg by mouth., Disp: , Rfl:   •  DULoxetine (CYMBALTA) 20 MG capsule, Take 1 capsule by mouth Daily for 30 days., Disp: 90 capsule, Rfl: 1  •  Easy Touch Insulin Syringe 30G X 5/16\" 0.5 ML misc, USE AS DIRECTED WITH CYNDIIR, Disp: , Rfl:   •  EASY TOUCH PEN NEEDLES 31G X 8 MM misc, , Disp: , Rfl:   •  epoetin hubert-epbx (RETACRIT) 77813 UNIT/ML injection, Inject 1 mL under the skin into the appropriate area as directed 3 (Three) Times a Week. Indications: ESRD on Dialysis, Disp: 6.6 mL, Rfl: 0  •  ferrous sulfate (FeroSul) 325 (65 FE) MG tablet, Take 1 tablet by mouth Daily With Breakfast., Disp: 30 tablet, Rfl: 3  •  glucose blood test strip, Use as instructed, Disp: 100 each, Rfl: 12  •  insulin aspart (novoLOG FLEXPEN) 100 UNIT/ML " solution pen-injector sc pen, Inject 30 units in addition to sliding scale on sheet of paper (0-24 Units) under the skin as directed 3 (three) times daily with meals., Disp: 45 mL, Rfl: 12  •  insulin aspart (novoLOG FLEXPEN) 100 UNIT/ML solution pen-injector sc pen, Inject 30 Units under the skin into the appropriate area as directed 3 (Three) Times a Day With Meals., Disp: 30 mL, Rfl: 12  •  insulin detemir (LEVEMIR) 100 UNIT/ML injection, Inject 30 Units under the skin into the appropriate area as directed Every morning and 35 units every night., Disp: 15 mL, Rfl: 12  •  Insulin Pen Needle (NovoFine) 30G X 8 MM misc, As directed 4 times daily, Disp: 100 each, Rfl: 11  •  Insulin Pen Needle 31G X 8 MM misc, Use to inject insulin 4 (Four) Times a Day as directed., Disp: 120 each, Rfl: 12  •  ipratropium-albuterol (DUO-NEB) 0.5-2.5 mg/3 ml nebulizer, Take 3 mL by nebulization 4 (Four) Times a Day., Disp: , Rfl:   •  isosorbide mononitrate (IMDUR) 30 MG 24 hr tablet, Take 1 tablet by mouth Every Morning., Disp: 90 tablet, Rfl: 1  •  levothyroxine (SYNTHROID, LEVOTHROID) 25 MCG tablet, Take 25 mcg by mouth Daily., Disp: , Rfl:   •  lidocaine (LIDODERM) 5 %, APPLY TO THE AFFECTED AREA(S) TOPICALLY TWO TIMES A DAY. REMOVE NIGHTLY, Disp: 30 patch, Rfl: 1  •  lisinopril (PRINIVIL,ZESTRIL) 5 MG tablet, Take 1 tablet by mouth Daily., Disp: 90 tablet, Rfl: 1  •  metoprolol tartrate (LOPRESSOR) 25 MG tablet, Take 1 tablet by mouth Every 12 (Twelve) Hours., Disp: 60 tablet, Rfl: 3  •  Mirabegron ER (Myrbetriq) 25 MG tablet sustained-release 24 hour 24 hr tablet, Take 25 mg by mouth Daily., Disp: , Rfl:   •  montelukast (SINGULAIR) 10 MG tablet, Take 1 tablet by mouth Every Night., Disp: 30 tablet, Rfl: 5  •  muscle rub (BenGay) 10-15 % cream cream, Apply 1 application topically to the appropriate area as directed 4 (Four) Times a Day As Needed for buttock pain., Disp: 85 g, Rfl: 1  •  nitroglycerin (NITROSTAT) 0.4 MG SL  tablet, Place 0.4 mg under the tongue Every 5 (Five) Minutes As Needed for Chest Pain (x 3 doses)., Disp: , Rfl:   •  nystatin (MYCOSTATIN) 684295 UNIT/GM powder, Apply  topically to the appropriate area as directed 3 (Three) Times a Day., Disp: 15 g, Rfl: 1  •  O2 (OXYGEN), Inhale 3 L/min Continuous., Disp: , Rfl:   •  ondansetron ODT (Zofran ODT) 4 MG disintegrating tablet, Place 1 tablet on the tongue Every 8 (Eight) Hours As Needed for Nausea or Vomiting., Disp: 30 tablet, Rfl: 0  •  oxybutynin XL (DITROPAN-XL) 5 MG 24 hr tablet, Take 1 tablet by mouth Daily., Disp: 90 tablet, Rfl: 1  •  pantoprazole (PROTONIX) 40 MG EC tablet, Take 1 tablet by mouth Every Night., Disp: 30 tablet, Rfl: 5  •  potassium chloride (MICRO-K) 10 MEQ CR capsule, Take 10 mEq by mouth Daily., Disp: , Rfl:   •  ranolazine (RANEXA) 500 MG 12 hr tablet, Take 1 tablet by mouth Every 12 (Twelve) Hours., Disp: 60 tablet, Rfl: 0  •  sennosides-docusate (PERICOLACE) 8.6-50 MG per tablet, Take 2 tablets by mouth 2 (Two) Times a Day., Disp: 60 tablet, Rfl: 2  •  sevelamer (RENVELA) 800 MG tablet, Take 800 mg by mouth 3 (Three) Times a Day With Meals., Disp: , Rfl:   •  sodium bicarbonate 650 MG tablet, Take 2 tablets by mouth Daily., Disp: 60 tablet, Rfl: 0  •  Capsaicin 0.035 % cream, Apply 3 g topically 3 (Three) Times a Day As Needed (ankle pain)., Disp: 42.5 g, Rfl: 2  •  Diclofenac Sodium (VOLTAREN) 1 % gel gel, Apply 4 g topically to the appropriate area as directed 4 (Four) Times a Day As Needed (Ankle pain)., Disp: 350 g, Rfl: 2    Allergies:  Allergies   Allergen Reactions   • Adhesive Tape Hives and Other (See Comments)   • Furosemide Other (See Comments)   • Latex Other (See Comments)   • Nsaids Hives   • Other      Bandaids, MRSA, SURECLOSE       Family Hx:  Family History   Problem Relation Age of Onset   • Heart disease Mother    • Lung cancer Mother    • Heart disease Father    • Heart attack Father    • Diabetes Father    • Heart  "disease Half-Sister         Dad's side   • Heart disease Brother    • No Known Problems Sister    • No Known Problems Sister    • No Known Problems Sister    • No Known Problems Sister    • No Known Problems Sister    • Pancreatic cancer Half-Sister         Dad's side   • No Known Problems Brother    • No Known Problems Brother    • Heart attack Half-Brother    • Heart attack Half-Brother    • No Known Problems Maternal Grandmother    • No Known Problems Maternal Grandfather    • No Known Problems Paternal Grandmother    • No Known Problems Paternal Grandfather         Social History:  Social History     Socioeconomic History   • Marital status:      Spouse name: wesley dumont   Tobacco Use   • Smoking status: Former Smoker     Packs/day: 0.25     Years: 46.00     Pack years: 11.50     Types: Cigarettes     Start date: 1999     Quit date: 2017     Years since quittin.0   • Smokeless tobacco: Never Used   • Tobacco comment: only smoking 5 a day - quit 2021   Substance and Sexual Activity   • Alcohol use: No   • Drug use: Not Currently     Types: LSD, Marijuana, Methamphetamines   • Sexual activity: Not Currently       Review of Systems  Review of Systems   Constitutional: Negative for activity change, appetite change, chills, diaphoresis and fatigue.   HENT: Negative for congestion and sneezing.    Respiratory: Negative for cough, choking, shortness of breath and wheezing.    Cardiovascular: Negative for chest pain and palpitations.   Gastrointestinal: Negative for abdominal pain, constipation, diarrhea and nausea.   Musculoskeletal: Positive for arthralgias.   Neurological: Negative for dizziness and headaches.   Hematological: Does not bruise/bleed easily.       Objective:     /80   Pulse 75   Temp 97.8 °F (36.6 °C)   Ht 157.5 cm (62\")   Wt (!) 140 kg (308 lb 4.8 oz)   LMP  (LMP Unknown)   SpO2 98% Comment: o2 level 3  BMI 56.39 kg/m²   Physical Exam  Vitals reviewed. "   Constitutional:       General: She is not in acute distress.     Appearance: She is not ill-appearing.      Comments: Patient on wheelchair.    HENT:      Head: Normocephalic and atraumatic.      Right Ear: External ear normal.      Left Ear: External ear normal.      Mouth/Throat:      Mouth: Mucous membranes are moist.   Eyes:      Conjunctiva/sclera: Conjunctivae normal.   Cardiovascular:      Rate and Rhythm: Normal rate and regular rhythm.   Pulmonary:      Effort: Pulmonary effort is normal.      Breath sounds: Normal breath sounds.      Comments: 3 L NC  Abdominal:      Palpations: Abdomen is soft.      Tenderness: There is no abdominal tenderness.   Skin:     Findings: Bruising present.             Comments: Bruising on left underarm secondary to trauma. Tender to touch.    Neurological:      Mental Status: She is alert.          Assessment/Plan:     Diagnoses and all orders for this visit:    1. Anemia due to chronic kidney disease, on chronic dialysis (HCC) (Primary)      -      Patient denies any chest pain, shortness of breath, dizziness.       -      Hb was 8      -      Patient gets dialysis on M/W/F.    2. Mixed hyperlipidemia  -     atorvastatin (LIPITOR) 20 MG tablet; Take 1 tablet by mouth every night at bedtime.  Dispense: 90 tablet; Refill:   -     Continue current medicaitons    3. Primary hypertension  -     isosorbide mononitrate (IMDUR) 30 MG 24 hr tablet; Take 1 tablet by mouth Every Morning.  Dispense: 90 tablet; Refill: 1  -     lisinopril (PRINIVIL,ZESTRIL) 5 MG tablet; Take 1 tablet by mouth Daily.  Dispense: 90 tablet; Refill: 1  -     Stable. Continue current medications    4. Type 2 diabetes mellitus with diabetic neuropathy, unspecified whether long term insulin use (HCC)  -     DULoxetine (CYMBALTA) 20 MG capsule; Take 1 capsule by mouth Daily for 30 days.  Dispense: 90 capsule; Refill: 1  -     Diclofenac Sodium (VOLTAREN) 1 % gel gel; Apply 4 g topically to the appropriate area  as directed 4 (Four) Times a Day As Needed (Ankle pain).  Dispense: 350 g; Refill: 2  -     Capsaicin 0.035 % cream; Apply 3 g topically 3 (Three) Times a Day As Needed (ankle pain).  Dispense: 42.5 g; Refill: 2  -     Discussed with patient and  how to apply capsicin cream.   -     Patient advised to give us a call or make an appointment if pain does not improve        · Rx changes: Capsaicin cream prescribed. Advised to alternate between Voltaren cream and capsaicin for her ankle pain.   · Patient Education: None   · Compliance at present is estimated to be fair.     Depression screening: Up to date; last screen 3/26/2021     Follow-up:     Return in about 6 months (around 9/3/2022) for Recheck.    Preventative:  Health Maintenance   Topic Date Due   • COVID-19 Vaccine (1) Never done   • ZOSTER VACCINE (1 of 2) Never done   • ANNUAL WELLNESS VISIT  Never done   • DIABETIC EYE EXAM  10/09/2019   • DIABETIC FOOT EXAM  12/26/2019   • PAP SMEAR  01/04/2020   • MAMMOGRAM  01/10/2022   • Pneumococcal Vaccine 0-64 (3 of 4 - PPSV23) 11/17/2022 (Originally 2/4/2020)   • LIPID PANEL  06/02/2022   • HEMOGLOBIN A1C  08/01/2022   • URINE MICROALBUMIN  10/19/2022   • TDAP/TD VACCINES (3 - Td or Tdap) 11/10/2024   • COLORECTAL CANCER SCREENING  05/14/2026   • HEPATITIS C SCREENING  Completed   • INFLUENZA VACCINE  Completed   • Hepatitis B  Aged Out     Female Preventative: patient is due for a pap smear  Recommended: Covid  Vaccine Counseling: Patient declined    Weight  -Class: Obese Class III extreme obesity: > or equal to 40kg/m2  -Patient's Body mass index is 56.39 kg/m². indicating that she is morbidly obese (BMI > 40 or > 35 with obesity - related health condition). Obesity-related health conditions include the following: hypertension, coronary heart disease, diabetes mellitus, dyslipidemias, GERD, peripheral vascular disease, lower extremity venous stasis disease and peripheral vascular disease. Obesity is  worsening. BMI is is above average; BMI management plan is completed. We discussed portion control and increasing exercise.    Alcohol use:  reports no history of alcohol use.  Nicotine status  reports that she quit smoking about 5 years ago. Her smoking use included cigarettes. She started smoking about 23 years ago. She has a 11.50 pack-year smoking history. She has never used smokeless tobacco.    Goals     • Fasting Blood Glucose       Barriers: compliance with diet      • Quit smoking / using tobacco           RISK SCORE: 4    Signature  [place] [address]  Office: [phone]        This document has been electronically signed by Rianna Macias MD on March 3, 2022 17:04 CST

## 2022-03-04 NOTE — HOME HEALTH
PATIENT WENT TO THE HOSPITAL SECONDARY TO NEED FOR BLOOD TRANSFUSION AND THEY KEPT HER IN OBSERVATION FOR A FEW DAYS SECONDARY TO HER LOW BLOOD COUNT. PATIENT STATES SHE HAD A FALL OVER THE WEEKEND BECAUSE SHE LOST HER BALANCE LATERAL. SHE IS DOING OK JUST SOME BRUISING. SHE STATES SHE IS WEAK IN HER LEGS AND HAS WEAK ACTIVITY TOLERANCE      60 DAY SUMMARY: PATIENT HAS RECEIVED SN, OT, AND PT OVER THE LAST 60 DAYS. PATIENT RECEIVED SKILLED PT SERVICES FOR LE STRENGTH, BALANCE, GAIT, TRANSFERS, ACTIVITY TOLERANCE. OT FOR UE STRENGTH, ADL ASSESSMENT AND SN FOR CP ASSESSMENT AND MEDICAITON MANAGEMENT. PATIENT WAS  DOING WELL WITH ALL HER ACTIVITY AND MEDICATIONS BUT THEN SHE WAS REHOSPITALIZED AND HAS INCREASED WEAKNESS AND HAS A FALL IN THE LAST FEW DAYS SECONDARY TO WEAKNESS IN HER LE. PATIENT REQUIRES PT RECERT AT THIS TIME TO ADDRESS HER FALL RISK, RECENT FALL, LE STRENGTH    PATIENT GOAL: GET STRONGER

## 2022-03-08 ENCOUNTER — READMISSION MANAGEMENT (OUTPATIENT)
Dept: CALL CENTER | Facility: HOSPITAL | Age: 62
End: 2022-03-08

## 2022-03-08 NOTE — OUTREACH NOTE
Medical Week 2 Survey    Flowsheet Row Responses   [facility] patient discharged from? [place]   Does the patient have one of the following disease processes/diagnoses(primary or secondary)? Other   Week 2 attempt successful? No   Unsuccessful attempts Attempt 1          KALPESH Chaney Registered Nurse

## 2022-03-08 NOTE — PROGRESS NOTES
"Yamileth Slater is a 61 y.o. female who presents for   Chief Complaint   Patient presents with   • Hospital Follow Up Visit         /80   Pulse 75   Temp 97.8 °F (36.6 °C)   Ht 157.5 cm (62\")   Wt (!) 140 kg (308 lb 4.8 oz)   LMP  (LMP Unknown)   SpO2 98% Comment: o2 level 3  BMI 56.39 kg/m²   Current Outpatient Medications:   •  acetaminophen (Tylenol 8 Hour) 650 MG 8 hr tablet, Take 1 tablet by mouth Every 8 (Eight) Hours As Needed for Mild Pain ., Disp: 90 tablet, Rfl: 0  •  albuterol (PROVENTIL) (2.5 MG/3ML) 0.083% nebulizer solution, Inhale the contents of 1 vial by nebulization Every 4 (Four) Hours As Needed for Wheezing., Disp: 75 mL, Rfl: 3  •  albuterol sulfate  (90 Base) MCG/ACT inhaler, Inhale 2 puffs Every 4 (Four) Hours As Needed for Wheezing., Disp: 18 g, Rfl: 1  •  aspirin (aspirin) 81 MG EC tablet, Take 1 tablet by mouth Daily., Disp: 60 tablet, Rfl: 5  •  atorvastatin (LIPITOR) 20 MG tablet, Take 1 tablet by mouth every night at bedtime., Disp: 90 tablet, Rfl: 1  •  Blood Glucose Monitoring Suppl ([employer] Blood Glucose Meter) w/Device kit, 1 each 3 (Three) Times a Day., Disp: 1 kit, Rfl: 3  •  bumetanide (BUMEX) 2 MG tablet, Take 1 tablet by mouth 4 (Four) Times a Week. (Patient taking differently: Take 2 mg by mouth 4 (Four) Times a Week. Pt states she takes medication on Sunday, Monday, Wednesday and Friday.), Disp: 16 tablet, Rfl: 0  •  clopidogrel (PLAVIX) 75 MG tablet, Take 1 tablet by mouth Daily., Disp: 30 tablet, Rfl: 5  •  Continuous Blood Gluc  (FreeStyle Jade 2 Dubberly) device, 1 each Continuous., Disp: 1 each, Rfl: 0  •  Continuous Blood Gluc Sensor (FreeStyle Jade 2 Sensor) misc, 1 each Every 14 (Fourteen) Days., Disp: 2 each, Rfl: 11  •  cyclobenzaprine (FLEXERIL) 5 MG tablet, Take 10 mg by mouth., Disp: , Rfl:   •  DULoxetine (CYMBALTA) 20 MG capsule, Take 1 capsule by mouth Daily for 30 days., Disp: 90 capsule, Rfl: 1  •  Easy Touch Insulin Syringe 30G X " "5/16\" 0.5 ML misc, USE AS DIRECTED WITH LEVEMIR, Disp: , Rfl:   •  EASY TOUCH PEN NEEDLES 31G X 8 MM misc, , Disp: , Rfl:   •  epoetin hubert-epbx (RETACRIT) 33988 UNIT/ML injection, Inject 1 mL under the skin into the appropriate area as directed 3 (Three) Times a Week. Indications: ESRD on Dialysis, Disp: 6.6 mL, Rfl: 0  •  ferrous sulfate (FeroSul) 325 (65 FE) MG tablet, Take 1 tablet by mouth Daily With Breakfast., Disp: 30 tablet, Rfl: 3  •  glucose blood test strip, Use as instructed, Disp: 100 each, Rfl: 12  •  insulin aspart (novoLOG FLEXPEN) 100 UNIT/ML solution pen-injector sc pen, Inject 30 units in addition to sliding scale on sheet of paper (0-24 Units) under the skin as directed 3 (three) times daily with meals., Disp: 45 mL, Rfl: 12  •  insulin aspart (novoLOG FLEXPEN) 100 UNIT/ML solution pen-injector sc pen, Inject 30 Units under the skin into the appropriate area as directed 3 (Three) Times a Day With Meals., Disp: 30 mL, Rfl: 12  •  insulin detemir (LEVEMIR) 100 UNIT/ML injection, Inject 30 Units under the skin into the appropriate area as directed Every morning and 35 units every night., Disp: 15 mL, Rfl: 12  •  Insulin Pen Needle (NovoFine) 30G X 8 MM misc, As directed 4 times daily, Disp: 100 each, Rfl: 11  •  Insulin Pen Needle 31G X 8 MM misc, Use to inject insulin 4 (Four) Times a Day as directed., Disp: 120 each, Rfl: 12  •  ipratropium-albuterol (DUO-NEB) 0.5-2.5 mg/3 ml nebulizer, Take 3 mL by nebulization 4 (Four) Times a Day., Disp: , Rfl:   •  isosorbide mononitrate (IMDUR) 30 MG 24 hr tablet, Take 1 tablet by mouth Every Morning., Disp: 90 tablet, Rfl: 1  •  levothyroxine (SYNTHROID, LEVOTHROID) 25 MCG tablet, Take 25 mcg by mouth Daily., Disp: , Rfl:   •  lidocaine (LIDODERM) 5 %, APPLY TO THE AFFECTED AREA(S) TOPICALLY TWO TIMES A DAY. REMOVE NIGHTLY, Disp: 30 patch, Rfl: 1  •  lisinopril (PRINIVIL,ZESTRIL) 5 MG tablet, Take 1 tablet by mouth Daily., Disp: 90 tablet, Rfl: 1  •  " metoprolol tartrate (LOPRESSOR) 25 MG tablet, Take 1 tablet by mouth Every 12 (Twelve) Hours., Disp: 60 tablet, Rfl: 3  •  Mirabegron ER (Myrbetriq) 25 MG tablet sustained-release 24 hour 24 hr tablet, Take 25 mg by mouth Daily., Disp: , Rfl:   •  montelukast (SINGULAIR) 10 MG tablet, Take 1 tablet by mouth Every Night., Disp: 30 tablet, Rfl: 5  •  muscle rub (BenGay) 10-15 % cream cream, Apply 1 application topically to the appropriate area as directed 4 (Four) Times a Day As Needed for buttock pain., Disp: 85 g, Rfl: 1  •  nitroglycerin (NITROSTAT) 0.4 MG SL tablet, Place 0.4 mg under the tongue Every 5 (Five) Minutes As Needed for Chest Pain (x 3 doses)., Disp: , Rfl:   •  nystatin (MYCOSTATIN) 030574 UNIT/GM powder, Apply  topically to the appropriate area as directed 3 (Three) Times a Day., Disp: 15 g, Rfl: 1  •  O2 (OXYGEN), Inhale 3 L/min Continuous., Disp: , Rfl:   •  ondansetron ODT (Zofran ODT) 4 MG disintegrating tablet, Place 1 tablet on the tongue Every 8 (Eight) Hours As Needed for Nausea or Vomiting., Disp: 30 tablet, Rfl: 0  •  oxybutynin XL (DITROPAN-XL) 5 MG 24 hr tablet, Take 1 tablet by mouth Daily., Disp: 90 tablet, Rfl: 1  •  pantoprazole (PROTONIX) 40 MG EC tablet, Take 1 tablet by mouth Every Night., Disp: 30 tablet, Rfl: 5  •  potassium chloride (MICRO-K) 10 MEQ CR capsule, Take 10 mEq by mouth Daily., Disp: , Rfl:   •  ranolazine (RANEXA) 500 MG 12 hr tablet, Take 1 tablet by mouth Every 12 (Twelve) Hours., Disp: 60 tablet, Rfl: 0  •  sennosides-docusate (PERICOLACE) 8.6-50 MG per tablet, Take 2 tablets by mouth 2 (Two) Times a Day., Disp: 60 tablet, Rfl: 2  •  sevelamer (RENVELA) 800 MG tablet, Take 800 mg by mouth 3 (Three) Times a Day With Meals., Disp: , Rfl:   •  sodium bicarbonate 650 MG tablet, Take 2 tablets by mouth Daily., Disp: 60 tablet, Rfl: 0  •  Capsaicin 0.035 % cream, Apply 3 g topically 3 (Three) Times a Day As Needed (ankle pain)., Disp: 42.5 g, Rfl: 2  •  Diclofenac  Sodium (VOLTAREN) 1 % gel gel, Apply 4 g topically to the appropriate area as directed 4 (Four) Times a Day As Needed (Ankle pain)., Disp: 350 g, Rfl: 2  Allergies   Allergen Reactions   • Adhesive Tape Hives and Other (See Comments)   • Furosemide Other (See Comments)   • Latex Other (See Comments)   • Nsaids Hives   • Other      Bandaids, MRSA, SURECLOSE       Physical Exam  Constitutional:       Appearance: Normal appearance.   HENT:      Head: Normocephalic and atraumatic.   Skin:     Findings: Bruising present.   Neurological:      General: No focal deficit present.      Mental Status: She is alert.         Assessment:     1. Anemia due to chronic kidney disease, on chronic dialysis (HCC)    2. Mixed hyperlipidemia    3. Primary hypertension    4. Type 2 diabetes mellitus with diabetic neuropathy, unspecified whether long term insulin use (HCC)       Plan:I have seen this patient and discussed the case with resident and agree with the assessment and plan.  ROSELIA Liz M.D.

## 2022-03-09 VITALS
DIASTOLIC BLOOD PRESSURE: 68 MMHG | HEART RATE: 68 BPM | OXYGEN SATURATION: 96 % | SYSTOLIC BLOOD PRESSURE: 112 MMHG | RESPIRATION RATE: 19 BRPM

## 2022-03-10 ENCOUNTER — HOME CARE VISIT (OUTPATIENT)
Dept: HOME HEALTH SERVICES | Facility: CLINIC | Age: 62
End: 2022-03-10

## 2022-03-10 PROCEDURE — G0179 MD RECERTIFICATION HHA PT: HCPCS | Performed by: FAMILY MEDICINE

## 2022-03-10 PROCEDURE — G0157 HHC PT ASSISTANT EA 15: HCPCS

## 2022-03-11 VITALS
RESPIRATION RATE: 18 BRPM | HEART RATE: 68 BPM | DIASTOLIC BLOOD PRESSURE: 70 MMHG | OXYGEN SATURATION: 97 % | TEMPERATURE: 97.6 F | SYSTOLIC BLOOD PRESSURE: 122 MMHG

## 2022-03-11 NOTE — HOME HEALTH
Patient states she had to go to the hospital last night for a blood transfusion. I'm feeling weak and tired today.

## 2022-03-14 ENCOUNTER — APPOINTMENT (OUTPATIENT)
Dept: GENERAL RADIOLOGY | Facility: HOSPITAL | Age: 62
End: 2022-03-14

## 2022-03-14 ENCOUNTER — READMISSION MANAGEMENT (OUTPATIENT)
Dept: CALL CENTER | Facility: HOSPITAL | Age: 62
End: 2022-03-14

## 2022-03-14 ENCOUNTER — HOSPITAL ENCOUNTER (OUTPATIENT)
Facility: HOSPITAL | Age: 62
Setting detail: OBSERVATION
Discharge: HOME | End: 2022-03-17
Attending: FAMILY MEDICINE | Admitting: HOSPITALIST

## 2022-03-14 DIAGNOSIS — Z74.09 IMPAIRED FUNCTIONAL MOBILITY, BALANCE, GAIT, AND ENDURANCE: ICD-10-CM

## 2022-03-14 DIAGNOSIS — Z74.09 IMPAIRED MOBILITY AND ADLS: ICD-10-CM

## 2022-03-14 DIAGNOSIS — I50.9 CONGESTIVE HEART FAILURE, UNSPECIFIED HF CHRONICITY, UNSPECIFIED HEART FAILURE TYPE: ICD-10-CM

## 2022-03-14 DIAGNOSIS — Z78.9 IMPAIRED MOBILITY AND ADLS: ICD-10-CM

## 2022-03-14 DIAGNOSIS — R07.2 PRECORDIAL PAIN: Primary | ICD-10-CM

## 2022-03-14 PROBLEM — R07.9 CHEST PAIN: Status: ACTIVE | Noted: 2022-03-14

## 2022-03-14 LAB
ABO GROUP BLD: NORMAL
ALBUMIN SERPL-MCNC: 3.2 G/DL (ref 3.5–5.2)
ALBUMIN/GLOB SERPL: 0.7 G/DL
ALP SERPL-CCNC: 230 U/L (ref 39–117)
ALT SERPL W P-5'-P-CCNC: 9 U/L (ref 1–33)
ANION GAP SERPL CALCULATED.3IONS-SCNC: 15 MMOL/L (ref 5–15)
AST SERPL-CCNC: 11 U/L (ref 1–32)
BASOPHILS # BLD AUTO: 0.06 10*3/MM3 (ref 0–0.2)
BASOPHILS NFR BLD AUTO: 0.8 % (ref 0–1.5)
BILIRUB SERPL-MCNC: 0.4 MG/DL (ref 0–1.2)
BLD GP AB SCN SERPL QL: NEGATIVE
BUN SERPL-MCNC: 57 MG/DL (ref 8–23)
BUN/CREAT SERPL: 14.6 (ref 7–25)
CALCIUM SPEC-SCNC: 8.7 MG/DL (ref 8.6–10.5)
CHLORIDE SERPL-SCNC: 98 MMOL/L (ref 98–107)
CK SERPL-CCNC: 46 U/L (ref 20–180)
CO2 SERPL-SCNC: 23 MMOL/L (ref 22–29)
CREAT SERPL-MCNC: 3.9 MG/DL (ref 0.57–1)
DEPRECATED RDW RBC AUTO: 50.9 FL (ref 37–54)
EGFRCR SERPLBLD CKD-EPI 2021: 12.4 ML/MIN/1.73
EOSINOPHIL # BLD AUTO: 0.23 10*3/MM3 (ref 0–0.4)
EOSINOPHIL NFR BLD AUTO: 3 % (ref 0.3–6.2)
ERYTHROCYTE [DISTWIDTH] IN BLOOD BY AUTOMATED COUNT: 15.7 % (ref 12.3–15.4)
FLUAV SUBTYP SPEC NAA+PROBE: NOT DETECTED
FLUBV RNA ISLT QL NAA+PROBE: NOT DETECTED
GLOBULIN UR ELPH-MCNC: 4.4 GM/DL
GLUCOSE BLDC GLUCOMTR-MCNC: 56 MG/DL (ref 70–130)
GLUCOSE SERPL-MCNC: 88 MG/DL (ref 65–99)
HCT VFR BLD AUTO: 22.6 % (ref 34–46.6)
HGB BLD-MCNC: 7.1 G/DL (ref 12–15.9)
HOLD SPECIMEN: NORMAL
HOLD SPECIMEN: NORMAL
IMM GRANULOCYTES # BLD AUTO: 0.05 10*3/MM3 (ref 0–0.05)
IMM GRANULOCYTES NFR BLD AUTO: 0.7 % (ref 0–0.5)
LIPASE SERPL-CCNC: 36 U/L (ref 13–60)
LYMPHOCYTES # BLD AUTO: 1.87 10*3/MM3 (ref 0.7–3.1)
LYMPHOCYTES NFR BLD AUTO: 24.6 % (ref 19.6–45.3)
Lab: NORMAL
MAGNESIUM SERPL-MCNC: 1.9 MG/DL (ref 1.6–2.4)
MCH RBC QN AUTO: 28 PG (ref 26.6–33)
MCHC RBC AUTO-ENTMCNC: 31.4 G/DL (ref 31.5–35.7)
MCV RBC AUTO: 89 FL (ref 79–97)
MONOCYTES # BLD AUTO: 0.44 10*3/MM3 (ref 0.1–0.9)
MONOCYTES NFR BLD AUTO: 5.8 % (ref 5–12)
NEUTROPHILS NFR BLD AUTO: 4.95 10*3/MM3 (ref 1.7–7)
NEUTROPHILS NFR BLD AUTO: 65.1 % (ref 42.7–76)
NRBC BLD AUTO-RTO: 0 /100 WBC (ref 0–0.2)
NT-PROBNP SERPL-MCNC: 5964 PG/ML (ref 0–900)
PLATELET # BLD AUTO: 263 10*3/MM3 (ref 140–450)
PMV BLD AUTO: 8.8 FL (ref 6–12)
POTASSIUM SERPL-SCNC: 4 MMOL/L (ref 3.5–5.2)
PROT SERPL-MCNC: 7.6 G/DL (ref 6–8.5)
RBC # BLD AUTO: 2.54 10*6/MM3 (ref 3.77–5.28)
RH BLD: POSITIVE
SARS-COV-2 RNA PNL SPEC NAA+PROBE: NOT DETECTED
SODIUM SERPL-SCNC: 136 MMOL/L (ref 136–145)
T&S EXPIRATION DATE: NORMAL
TROPONIN T SERPL-MCNC: 0.02 NG/ML (ref 0–0.03)
TROPONIN T SERPL-MCNC: 0.03 NG/ML (ref 0–0.03)
WBC NRBC COR # BLD: 7.6 10*3/MM3 (ref 3.4–10.8)
WHOLE BLOOD HOLD SPECIMEN: NORMAL
WHOLE BLOOD HOLD SPECIMEN: NORMAL

## 2022-03-14 PROCEDURE — 93005 ELECTROCARDIOGRAM TRACING: CPT | Performed by: FAMILY MEDICINE

## 2022-03-14 PROCEDURE — C9803 HOPD COVID-19 SPEC COLLECT: HCPCS

## 2022-03-14 PROCEDURE — 96375 TX/PRO/DX INJ NEW DRUG ADDON: CPT

## 2022-03-14 PROCEDURE — 96374 THER/PROPH/DIAG INJ IV PUSH: CPT

## 2022-03-14 PROCEDURE — 84484 ASSAY OF TROPONIN QUANT: CPT | Performed by: FAMILY MEDICINE

## 2022-03-14 PROCEDURE — 86900 BLOOD TYPING SEROLOGIC ABO: CPT | Performed by: STUDENT IN AN ORGANIZED HEALTH CARE EDUCATION/TRAINING PROGRAM

## 2022-03-14 PROCEDURE — 83880 ASSAY OF NATRIURETIC PEPTIDE: CPT | Performed by: FAMILY MEDICINE

## 2022-03-14 PROCEDURE — 82550 ASSAY OF CK (CPK): CPT | Performed by: FAMILY MEDICINE

## 2022-03-14 PROCEDURE — 87340 HEPATITIS B SURFACE AG IA: CPT | Performed by: INTERNAL MEDICINE

## 2022-03-14 PROCEDURE — 99219 PR INITIAL OBSERVATION CARE/DAY 50 MINUTES: CPT | Performed by: STUDENT IN AN ORGANIZED HEALTH CARE EDUCATION/TRAINING PROGRAM

## 2022-03-14 PROCEDURE — 86901 BLOOD TYPING SEROLOGIC RH(D): CPT | Performed by: STUDENT IN AN ORGANIZED HEALTH CARE EDUCATION/TRAINING PROGRAM

## 2022-03-14 PROCEDURE — 87636 SARSCOV2 & INF A&B AMP PRB: CPT | Performed by: FAMILY MEDICINE

## 2022-03-14 PROCEDURE — 82962 GLUCOSE BLOOD TEST: CPT

## 2022-03-14 PROCEDURE — G0378 HOSPITAL OBSERVATION PER HR: HCPCS

## 2022-03-14 PROCEDURE — 87040 BLOOD CULTURE FOR BACTERIA: CPT | Performed by: STUDENT IN AN ORGANIZED HEALTH CARE EDUCATION/TRAINING PROGRAM

## 2022-03-14 PROCEDURE — 80053 COMPREHEN METABOLIC PANEL: CPT | Performed by: FAMILY MEDICINE

## 2022-03-14 PROCEDURE — 85025 COMPLETE CBC W/AUTO DIFF WBC: CPT | Performed by: FAMILY MEDICINE

## 2022-03-14 PROCEDURE — 86923 COMPATIBILITY TEST ELECTRIC: CPT

## 2022-03-14 PROCEDURE — 94799 UNLISTED PULMONARY SVC/PX: CPT

## 2022-03-14 PROCEDURE — 83690 ASSAY OF LIPASE: CPT | Performed by: FAMILY MEDICINE

## 2022-03-14 PROCEDURE — 83735 ASSAY OF MAGNESIUM: CPT | Performed by: FAMILY MEDICINE

## 2022-03-14 PROCEDURE — 99284 EMERGENCY DEPT VISIT MOD MDM: CPT

## 2022-03-14 PROCEDURE — 93005 ELECTROCARDIOGRAM TRACING: CPT

## 2022-03-14 PROCEDURE — 71045 X-RAY EXAM CHEST 1 VIEW: CPT

## 2022-03-14 PROCEDURE — 93010 ELECTROCARDIOGRAM REPORT: CPT | Performed by: INTERNAL MEDICINE

## 2022-03-14 PROCEDURE — 25010000002 MORPHINE PER 10 MG: Performed by: FAMILY MEDICINE

## 2022-03-14 PROCEDURE — 86850 RBC ANTIBODY SCREEN: CPT | Performed by: STUDENT IN AN ORGANIZED HEALTH CARE EDUCATION/TRAINING PROGRAM

## 2022-03-14 RX ORDER — CALCIUM CARBONATE 200(500)MG
1 TABLET,CHEWABLE ORAL 2 TIMES DAILY PRN
Status: DISCONTINUED | OUTPATIENT
Start: 2022-03-14 | End: 2022-03-17 | Stop reason: HOSPADM

## 2022-03-14 RX ORDER — BUMETANIDE 1 MG/1
2 TABLET ORAL DAILY
Status: DISCONTINUED | OUTPATIENT
Start: 2022-03-14 | End: 2022-03-14

## 2022-03-14 RX ORDER — NITROGLYCERIN 0.4 MG/1
0.4 TABLET SUBLINGUAL
Status: DISCONTINUED | OUTPATIENT
Start: 2022-03-14 | End: 2022-03-14 | Stop reason: SDUPTHER

## 2022-03-14 RX ORDER — SODIUM CHLORIDE 0.9 % (FLUSH) 0.9 %
10 SYRINGE (ML) INJECTION EVERY 12 HOURS SCHEDULED
Status: DISCONTINUED | OUTPATIENT
Start: 2022-03-14 | End: 2022-03-17 | Stop reason: HOSPADM

## 2022-03-14 RX ORDER — OXYBUTYNIN CHLORIDE 5 MG/1
5 TABLET, EXTENDED RELEASE ORAL DAILY
Status: DISCONTINUED | OUTPATIENT
Start: 2022-03-15 | End: 2022-03-17 | Stop reason: HOSPADM

## 2022-03-14 RX ORDER — SODIUM CHLORIDE 0.9 % (FLUSH) 0.9 %
10 SYRINGE (ML) INJECTION AS NEEDED
Status: DISCONTINUED | OUTPATIENT
Start: 2022-03-14 | End: 2022-03-17 | Stop reason: HOSPADM

## 2022-03-14 RX ORDER — BUMETANIDE 1 MG/1
2 TABLET ORAL
Status: DISCONTINUED | OUTPATIENT
Start: 2022-03-15 | End: 2022-03-17 | Stop reason: HOSPADM

## 2022-03-14 RX ORDER — CYCLOBENZAPRINE HCL 10 MG
10 TABLET ORAL 3 TIMES DAILY PRN
Status: DISCONTINUED | OUTPATIENT
Start: 2022-03-14 | End: 2022-03-17 | Stop reason: HOSPADM

## 2022-03-14 RX ORDER — LISINOPRIL 5 MG/1
5 TABLET ORAL DAILY
Status: DISCONTINUED | OUTPATIENT
Start: 2022-03-14 | End: 2022-03-17 | Stop reason: HOSPADM

## 2022-03-14 RX ORDER — DEXTROSE MONOHYDRATE 25 G/50ML
25 INJECTION, SOLUTION INTRAVENOUS
Status: DISCONTINUED | OUTPATIENT
Start: 2022-03-14 | End: 2022-03-17 | Stop reason: HOSPADM

## 2022-03-14 RX ORDER — ASPIRIN 81 MG
3 TABLET,CHEWABLE ORAL 3 TIMES DAILY PRN
Status: DISCONTINUED | OUTPATIENT
Start: 2022-03-14 | End: 2022-03-17 | Stop reason: HOSPADM

## 2022-03-14 RX ORDER — LIDOCAINE 50 MG/G
1 PATCH TOPICAL
Status: DISCONTINUED | OUTPATIENT
Start: 2022-03-15 | End: 2022-03-17 | Stop reason: HOSPADM

## 2022-03-14 RX ORDER — NYSTATIN 100000 [USP'U]/G
POWDER TOPICAL 3 TIMES DAILY
Status: DISCONTINUED | OUTPATIENT
Start: 2022-03-14 | End: 2022-03-17 | Stop reason: HOSPADM

## 2022-03-14 RX ORDER — PANTOPRAZOLE SODIUM 40 MG/1
40 TABLET, DELAYED RELEASE ORAL NIGHTLY
Status: DISCONTINUED | OUTPATIENT
Start: 2022-03-14 | End: 2022-03-17 | Stop reason: HOSPADM

## 2022-03-14 RX ORDER — ATORVASTATIN CALCIUM 20 MG/1
20 TABLET, FILM COATED ORAL NIGHTLY
Status: DISCONTINUED | OUTPATIENT
Start: 2022-03-14 | End: 2022-03-17 | Stop reason: HOSPADM

## 2022-03-14 RX ORDER — FERROUS SULFATE TAB EC 324 MG (65 MG FE EQUIVALENT) 324 (65 FE) MG
324 TABLET DELAYED RESPONSE ORAL
Status: DISCONTINUED | OUTPATIENT
Start: 2022-03-15 | End: 2022-03-17 | Stop reason: HOSPADM

## 2022-03-14 RX ORDER — BUDESONIDE AND FORMOTEROL FUMARATE DIHYDRATE 160; 4.5 UG/1; UG/1
2 AEROSOL RESPIRATORY (INHALATION)
Status: DISCONTINUED | OUTPATIENT
Start: 2022-03-14 | End: 2022-03-17 | Stop reason: HOSPADM

## 2022-03-14 RX ORDER — ONDANSETRON 2 MG/ML
4 INJECTION INTRAMUSCULAR; INTRAVENOUS EVERY 6 HOURS PRN
Status: DISCONTINUED | OUTPATIENT
Start: 2022-03-14 | End: 2022-03-17 | Stop reason: HOSPADM

## 2022-03-14 RX ORDER — IPRATROPIUM BROMIDE AND ALBUTEROL SULFATE 2.5; .5 MG/3ML; MG/3ML
3 SOLUTION RESPIRATORY (INHALATION)
Status: DISCONTINUED | OUTPATIENT
Start: 2022-03-14 | End: 2022-03-17 | Stop reason: HOSPADM

## 2022-03-14 RX ORDER — HYDRALAZINE HYDROCHLORIDE 20 MG/ML
10 INJECTION INTRAMUSCULAR; INTRAVENOUS EVERY 4 HOURS PRN
Status: DISCONTINUED | OUTPATIENT
Start: 2022-03-14 | End: 2022-03-17 | Stop reason: HOSPADM

## 2022-03-14 RX ORDER — ONDANSETRON 4 MG/1
4 TABLET, FILM COATED ORAL EVERY 6 HOURS PRN
Status: DISCONTINUED | OUTPATIENT
Start: 2022-03-14 | End: 2022-03-17 | Stop reason: HOSPADM

## 2022-03-14 RX ORDER — LEVOTHYROXINE SODIUM 0.03 MG/1
25 TABLET ORAL DAILY
Status: DISCONTINUED | OUTPATIENT
Start: 2022-03-15 | End: 2022-03-17 | Stop reason: HOSPADM

## 2022-03-14 RX ORDER — MONTELUKAST SODIUM 10 MG/1
10 TABLET ORAL NIGHTLY
Status: DISCONTINUED | OUTPATIENT
Start: 2022-03-14 | End: 2022-03-17 | Stop reason: HOSPADM

## 2022-03-14 RX ORDER — ISOSORBIDE MONONITRATE 30 MG/1
30 TABLET, EXTENDED RELEASE ORAL EVERY MORNING
Status: DISCONTINUED | OUTPATIENT
Start: 2022-03-15 | End: 2022-03-17 | Stop reason: HOSPADM

## 2022-03-14 RX ORDER — LANOLIN ALCOHOL/MO/W.PET/CERES
6 CREAM (GRAM) TOPICAL NIGHTLY PRN
Status: DISCONTINUED | OUTPATIENT
Start: 2022-03-14 | End: 2022-03-17 | Stop reason: HOSPADM

## 2022-03-14 RX ORDER — SEVELAMER CARBONATE 800 MG/1
800 TABLET, FILM COATED ORAL
Status: DISCONTINUED | OUTPATIENT
Start: 2022-03-15 | End: 2022-03-17 | Stop reason: HOSPADM

## 2022-03-14 RX ORDER — BUMETANIDE 1 MG/1
2 TABLET ORAL ONCE
Status: COMPLETED | OUTPATIENT
Start: 2022-03-14 | End: 2022-03-14

## 2022-03-14 RX ORDER — AMOXICILLIN 250 MG
2 CAPSULE ORAL 2 TIMES DAILY
Status: DISCONTINUED | OUTPATIENT
Start: 2022-03-14 | End: 2022-03-17 | Stop reason: HOSPADM

## 2022-03-14 RX ORDER — RANOLAZINE 500 MG/1
500 TABLET, EXTENDED RELEASE ORAL EVERY 12 HOURS SCHEDULED
Status: DISCONTINUED | OUTPATIENT
Start: 2022-03-14 | End: 2022-03-17 | Stop reason: HOSPADM

## 2022-03-14 RX ORDER — ALBUTEROL SULFATE 2.5 MG/3ML
2.5 SOLUTION RESPIRATORY (INHALATION) EVERY 4 HOURS PRN
Status: DISCONTINUED | OUTPATIENT
Start: 2022-03-14 | End: 2022-03-17 | Stop reason: HOSPADM

## 2022-03-14 RX ORDER — CLOPIDOGREL BISULFATE 75 MG/1
75 TABLET ORAL DAILY
Status: DISCONTINUED | OUTPATIENT
Start: 2022-03-15 | End: 2022-03-17 | Stop reason: HOSPADM

## 2022-03-14 RX ORDER — SODIUM BICARBONATE 650 MG/1
1300 TABLET ORAL DAILY
Status: DISCONTINUED | OUTPATIENT
Start: 2022-03-15 | End: 2022-03-17

## 2022-03-14 RX ORDER — NICOTINE POLACRILEX 4 MG
15 LOZENGE BUCCAL
Status: DISCONTINUED | OUTPATIENT
Start: 2022-03-14 | End: 2022-03-17 | Stop reason: HOSPADM

## 2022-03-14 RX ORDER — POTASSIUM CHLORIDE 750 MG/1
10 CAPSULE, EXTENDED RELEASE ORAL DAILY
Status: DISCONTINUED | OUTPATIENT
Start: 2022-03-15 | End: 2022-03-16

## 2022-03-14 RX ORDER — NITROGLYCERIN 0.4 MG/1
0.4 TABLET SUBLINGUAL
Status: DISCONTINUED | OUTPATIENT
Start: 2022-03-14 | End: 2022-03-17 | Stop reason: HOSPADM

## 2022-03-14 RX ADMIN — RANOLAZINE 500 MG: 500 TABLET, FILM COATED, EXTENDED RELEASE ORAL at 22:29

## 2022-03-14 RX ADMIN — METOPROLOL TARTRATE 25 MG: 25 TABLET, FILM COATED ORAL at 22:29

## 2022-03-14 RX ADMIN — BUMETANIDE 2 MG: 1 TABLET ORAL at 22:38

## 2022-03-14 RX ADMIN — MORPHINE SULFATE 4 MG: 4 INJECTION INTRAVENOUS at 17:59

## 2022-03-14 RX ADMIN — PANTOPRAZOLE SODIUM 40 MG: 40 TABLET, DELAYED RELEASE ORAL at 22:29

## 2022-03-14 RX ADMIN — LISINOPRIL 5 MG: 5 TABLET ORAL at 22:29

## 2022-03-14 RX ADMIN — IPRATROPIUM BROMIDE AND ALBUTEROL SULFATE 3 ML: 2.5; .5 SOLUTION RESPIRATORY (INHALATION) at 22:20

## 2022-03-14 RX ADMIN — ATORVASTATIN CALCIUM 20 MG: 20 TABLET, FILM COATED ORAL at 22:29

## 2022-03-14 RX ADMIN — DOCUSATE SODIUM 50 MG AND SENNOSIDES 8.6 MG 2 TABLET: 8.6; 5 TABLET, FILM COATED ORAL at 22:29

## 2022-03-14 RX ADMIN — Medication 10 ML: at 22:29

## 2022-03-14 RX ADMIN — MONTELUKAST 10 MG: 10 TABLET, FILM COATED ORAL at 22:29

## 2022-03-14 NOTE — ED PROVIDER NOTES
Subjective   Patient presents to the emergency department with chest pain and shortness of breath for the past 2 days.  She has a history of CABG was performed in 2013 and has had several stents placed since then, last stent was placed 3 months ago.  Patient continues to smoke.  She is on hemodialysis, Monday Wednesday Friday.  She has not had dialysis since last Friday.        Chest Pain  Pain location:  Substernal area  Pain quality: dull    Pain severity:  Moderate  Duration:  2 days  Progression:  Waxing and waning  Chronicity:  Recurrent  Relieved by:  Rest  Worsened by:  Deep breathing  Associated symptoms: shortness of breath    Associated symptoms: no abdominal pain, no cough, no diaphoresis, no dizziness, no dysphagia, no fatigue, no fever, no headache, no nausea, no vomiting and no weakness    Risk factors: coronary artery disease, diabetes mellitus, high cholesterol, hypertension, obesity and smoking        Review of Systems   Constitutional: Positive for activity change. Negative for appetite change, chills, diaphoresis, fatigue and fever.   HENT: Negative for congestion, ear discharge, ear pain, nosebleeds, rhinorrhea, sinus pressure, sore throat and trouble swallowing.    Eyes: Negative for discharge and redness.   Respiratory: Positive for shortness of breath. Negative for apnea, cough, chest tightness and wheezing.    Cardiovascular: Positive for chest pain.   Gastrointestinal: Negative for abdominal pain, diarrhea, nausea and vomiting.   Endocrine: Negative for polyuria.   Genitourinary: Negative for dysuria, frequency and urgency.   Musculoskeletal: Negative for myalgias and neck pain.   Skin: Negative for color change and rash.   Allergic/Immunologic: Negative for immunocompromised state.   Neurological: Negative for dizziness, seizures, syncope, weakness, light-headedness and headaches.   Hematological: Negative for adenopathy. Does not bruise/bleed easily.   Psychiatric/Behavioral: Negative for  behavioral problems and confusion.   All other systems reviewed and are negative.      Past Medical History:   Diagnosis Date   • Acute blood loss anemia 4/16/2017    Likely due to gastric oozing at this time. - Dr. Duarte (GI) was consulted and has now signed off, will follow up outpatient - pill colonoscopy showed AVMs - continue to monitor   • Acute cystitis with hematuria 3/31/2021    1/13: IV Rocephin 1 gm q 24 1/14 : transitioned  to omnicef 300mg. Urine cultures resulted and did not show growth. Omnicef discontinued as patient is asymptomatic   • Altered mental status 1/9/2022    - AMS on presentation - initial ABG pH 7.3, CO2 34 - Procal 0.29 - UA negative for acute cysitits -CTA head wnl  - Empiric Zosyn and Vancomycin -Lactate 2.5 on admission  - blood cultures no growth at 24 hours     • Anxiety    • CAD (coronary artery disease) 4/24/2021    S/P 3 stents 5/1/2021 for [facility] Continue ASA 81mg & Clopidogrel 75mg Continue Atorvastatin 40mg   • Carotid artery stenosis    • Chronic obstructive lung disease (HCC)    • CKD (chronic kidney disease) stage 4, GFR 15-29 ml/min ([facility])    • CKD (chronic kidney disease), symptom management only, stage 5 (HCC) 10/5/2020    Results from last 7 days Lab Units 12/15/21 0548 12/14/21 1323 12/14/21 0916 CREATININE mg/dL 3.92* 3.21* 3.32*  Baseline creatinine 2-3 GFR 13-25 GFR 15 Dialysis MWF, sees Dr. Lauren Nephrology consult,, appreciate recommendations Continue Bumex 1mg bid daily Holding Bumex 2mg 4 times a week   • Colonic polyp    • Coronary arteriosclerosis    • Diabetes mellitus (HCC)    • Diabetic neuropathy (HCC)    • Ear pain, right 10/18/2021    - canal trauma due to patient scratching and DMT2 - added cortisporin ear drops   • Elevated troponin 10/12/2021    -most likely from CKD -Trending down -Neg chest pain   • GERD (gastroesophageal reflux disease)    • GI bleed 5/13/2021    - GI will follow up outpatient - Protonix 40mg daily - Avoid medical DVT prophy and use  mechanical at this time instead. - Continue to monitor - pill colonoscopy results showed AVMs   • History of transfusion    • Hypercholesterolemia    • Hypertension    • Hypomagnesemia 6/27/2021    Monitor and replace   • Morbid obesity (HCC)    • Nephrolithiasis    • Peripheral vascular disease (HCC)    • SIRS (systemic inflammatory response syndrome) ([facility]) 1/9/2022    Admission  - WBC 17.78   -   - RR 16 - 1/10: VSS/wnl - CXR - Mild pulm edema - Blood cultures no growth at 24 hours  - Procalcitonin 0.29 - UA : glucose 1000, negative Leucocytes/nitrate - Empiric Zosyn and Vancomcyin    • Sleep apnea    • Substance abuse ([facility])    • Vitamin D deficiency        Allergies   Allergen Reactions   • Adhesive Tape Hives and Other (See Comments)   • Latex Other (See Comments)   • Nsaids Hives   • Other      Bandaids, MRSA, SURECLOSE       Past Surgical History:   Procedure Laterality Date   • CARDIAC CATHETERIZATION N/A 7/14/2020   • CARDIAC CATHETERIZATION N/A 4/23/2021    Procedure: Left Heart Cath;  Surgeon: Melba Romo MD;  Location: [facility] CATH INVASIVE LOCATION;  Service: Cardiology;  Laterality: N/A;   • CARDIAC CATHETERIZATION N/A 4/30/2021    Procedure: Percutaneous Coronary Intervention;  Surgeon: Russell Voss MD;  Location: [facility] CATH INVASIVE LOCATION;  Service: Cardiovascular;  Laterality: N/A;   • CARDIAC CATHETERIZATION N/A 4/30/2021    Procedure: Stent NIKKI coronary;  Surgeon: Russell Voss MD;  Location: [facility] CATH INVASIVE LOCATION;  Service: Cardiovascular;  Laterality: N/A;   • CARDIAC CATHETERIZATION Left 11/13/2021    Procedure: Left Heart Cath;  Surgeon: Niall Rios MD;  Location: [facility] CATH INVASIVE LOCATION;  Service: Cardiology;  Laterality: Left;   • CAROTID STENT Left    • COLONOSCOPY     • COLONOSCOPY N/A 5/14/2021    Procedure: COLONOSCOPY;  Surgeon: Mingo Duarte MD;  Location: [facility] ENDOSCOPY;  Service: Gastroenterology;  Laterality: N/A;   •  CORONARY ARTERY BYPASS GRAFT N/A 2013    CABG X 3   • CYSTOSCOPY BLADDER STONE LITHOTRIPSY Bilateral    • ENDOSCOPY N/A 4/12/2021    Procedure: ESOPHAGOGASTRODUODENOSCOPY;  Surgeon: Mingo Duarte MD;  Location: [facility] ENDOSCOPY;  Service: Gastroenterology;  Laterality: N/A;   • ENDOSCOPY N/A 5/14/2021    Procedure: ESOPHAGOGASTRODUODENOSCOPY;  Surgeon: Mingo Duarte MD;  Location: [facility] ENDOSCOPY;  Service: Gastroenterology;  Laterality: N/A;   • ENDOSCOPY N/A 1/28/2022    Procedure: ESOPHAGOGASTRODUODENOSCOPY;  Surgeon: Mingo Duarte MD;  Location: [facility] ENDOSCOPY;  Service: Gastroenterology;  Laterality: N/A;   • INTERVENTIONAL RADIOLOGY PROCEDURE N/A 10/21/2021    Procedure: tunneled central venous catheter placement;  Surgeon: Donnie Robles MD;  Location: [facility] ANGIO INVASIVE LOCATION;  Service: Interventional Radiology;  Laterality: N/A;   • INTERVENTIONAL RADIOLOGY PROCEDURE N/A 1/27/2022    Procedure: tunneled central venous catheter placement;  Surgeon: Donnie Robles MD;  Location: [facility] ANGIO INVASIVE LOCATION;  Service: Interventional Radiology;  Laterality: N/A;       Family History   Problem Relation Age of Onset   • Heart disease Mother    • Lung cancer Mother    • Heart disease Father    • Heart attack Father    • Diabetes Father    • Heart disease Half-Sister         Dad's side   • Heart disease Brother    • No Known Problems Sister    • No Known Problems Sister    • No Known Problems Sister    • No Known Problems Sister    • No Known Problems Sister    • Pancreatic cancer Half-Sister         Dad's side   • No Known Problems Brother    • No Known Problems Brother    • Heart attack Half-Brother    • Heart attack Half-Brother    • No Known Problems Maternal Grandmother    • No Known Problems Maternal Grandfather    • No Known Problems Paternal Grandmother    • No Known Problems Paternal Grandfather        Social History     Socioeconomic History   • Marital status:       Spouse name: wesley dumont   Tobacco Use   • Smoking status: Former Smoker     Packs/day: 0.25     Years: 46.00     Pack years: 11.50     Types: Cigarettes     Start date: 1999     Quit date: 2017     Years since quittin.1   • Smokeless tobacco: Never Used   • Tobacco comment: only smoking 5 a day - quit 2021   Substance and Sexual Activity   • Alcohol use: No   • Drug use: Not Currently     Types: LSD, Marijuana, Methamphetamines   • Sexual activity: Not Currently           Objective   Physical Exam  Vitals and nursing note reviewed.   Constitutional:       Appearance: She is well-developed.   HENT:      Head: Normocephalic and atraumatic.      Nose: Nose normal.   Eyes:      General: No scleral icterus.        Right eye: No discharge.         Left eye: No discharge.      Conjunctiva/sclera: Conjunctivae normal.      Pupils: Pupils are equal, round, and reactive to light.   Neck:      Trachea: No tracheal deviation.   Cardiovascular:      Rate and Rhythm: Normal rate and regular rhythm.      Heart sounds: Normal heart sounds. No murmur heard.  Pulmonary:      Effort: Pulmonary effort is normal. No respiratory distress.      Breath sounds: Normal breath sounds. No stridor. No wheezing or rales.   Abdominal:      General: Bowel sounds are normal. There is no distension.      Palpations: Abdomen is soft. There is no mass.      Tenderness: There is no abdominal tenderness. There is no guarding or rebound.   Musculoskeletal:      Cervical back: Normal range of motion and neck supple.   Skin:     General: Skin is warm and dry.      Findings: No erythema or rash.   Neurological:      Mental Status: She is alert and oriented to person, place, and time.      Coordination: Coordination normal.   Psychiatric:         Behavior: Behavior normal.         Thought Content: Thought content normal.         Procedures           ED Course                   Labs Reviewed   COMPREHENSIVE METABOLIC PANEL -  Abnormal; Notable for the following components:       Result Value    BUN 57 (*)     Creatinine 3.90 (*)     Albumin 3.20 (*)     Alkaline Phosphatase 230 (*)     eGFR 12.4 (*)     All other components within normal limits    Narrative:     GFR Normal >60  Chronic Kidney Disease <60  Kidney Failure <15     BNP (IN-HOUSE) - Abnormal; Notable for the following components:    proBNP 5,964.0 (*)     All other components within normal limits    Narrative:     Among patients with dyspnea, NT-proBNP is highly sensitive for the detection of acute congestive heart failure. In addition NT-proBNP of <300 pg/ml effectively rules out acute congestive heart failure with 99% negative predictive value.    Results may be falsely decreased if patient taking Biotin.     CBC WITH AUTO DIFFERENTIAL - Abnormal; Notable for the following components:    RBC 2.54 (*)     Hemoglobin 7.1 (*)     Hematocrit 22.6 (*)     MCHC 31.4 (*)     RDW 15.7 (*)     Immature Grans % 0.7 (*)     All other components within normal limits   CBC WITH AUTO DIFFERENTIAL - Abnormal; Notable for the following components:    RBC 2.96 (*)     Hemoglobin 8.4 (*)     Hematocrit 26.9 (*)     MCHC 31.2 (*)     Immature Grans % 0.6 (*)     All other components within normal limits   COMPREHENSIVE METABOLIC PANEL - Abnormal; Notable for the following components:    BUN 58 (*)     Creatinine 3.79 (*)     Sodium 131 (*)     Chloride 95 (*)     Alkaline Phosphatase 215 (*)     eGFR 12.8 (*)     All other components within normal limits    Narrative:     GFR Normal >60  Chronic Kidney Disease <60  Kidney Failure <15     CBC WITH AUTO DIFFERENTIAL - Abnormal; Notable for the following components:    RBC 3.06 (*)     Hemoglobin 8.7 (*)     Hematocrit 27.2 (*)     RDW 15.6 (*)     Immature Grans % 0.9 (*)     Immature Grans, Absolute 0.06 (*)     All other components within normal limits   COMPREHENSIVE METABOLIC PANEL - Abnormal; Notable for the following components:     Glucose 400 (*)     BUN 38 (*)     Creatinine 3.02 (*)     Sodium 133 (*)     Chloride 96 (*)     Alkaline Phosphatase 219 (*)     eGFR 16.8 (*)     All other components within normal limits    Narrative:     GFR Normal >60  Chronic Kidney Disease <60  Kidney Failure <15     POCT GLUCOSE FINGERSTICK - Abnormal; Notable for the following components:    Glucose 56 (*)     All other components within normal limits   POCT GLUCOSE FINGERSTICK - Abnormal; Notable for the following components:    Glucose 388 (*)     All other components within normal limits   COVID-19 AND FLU A/B, NP SWAB IN TRANSPORT MEDIA 8-12 HR TAT - Normal    Narrative:     Fact sheet for providers: https://www.fda.gov/media/027525/download    Fact sheet for patients: https://www.fda.gov/media/444709/download    Test performed by PCR.   TROPONIN (IN-HOUSE) - Normal    Narrative:     Troponin T Reference Range:  <= 0.03 ng/mL-   Negative for AMI  >0.03 ng/mL-     Abnormal for myocardial necrosis.  Clinicians would have to utilize clinical acumen, EKG, Troponin and serial changes to determine if it is an Acute Myocardial Infarction or myocardial injury due to an underlying chronic condition.       Results may be falsely decreased if patient taking Biotin.     TROPONIN (IN-HOUSE) - Normal    Narrative:     Troponin T Reference Range:  <= 0.03 ng/mL-   Negative for AMI  >0.03 ng/mL-     Abnormal for myocardial necrosis.  Clinicians would have to utilize clinical acumen, EKG, Troponin and serial changes to determine if it is an Acute Myocardial Infarction or myocardial injury due to an underlying chronic condition.       Results may be falsely decreased if patient taking Biotin.     LIPASE - Normal   CK - Normal   MAGNESIUM - Normal   PROCALCITONIN - Normal    Narrative:     As a Marker for Sepsis (Non-Neonates):    1. <0.5 ng/mL represents a low risk of severe sepsis and/or septic shock.  2. >2 ng/mL represents a high risk of severe sepsis and/or septic  "shock.    As a Marker for Lower Respiratory Tract Infections that require antibiotic therapy:    PCT on Admission    Antibiotic Therapy       6-12 Hrs later    >0.5                Strongly Recommended  >0.25 - <0.5        Recommended   0.1 - 0.25          Discouraged              Remeasure/reassess PCT  <0.1                Strongly Discouraged     Remeasure/reassess PCT    As 28 day mortality risk marker: \"Change in Procalcitonin Result\" (>80% or <=80%) if Day 0 (or Day 1) and Day 4 values are available. Refer to http://www.[facility]-pct-calculator.com    Change in PCT <=80%  A decrease of PCT levels below or equal to 80% defines a positive change in PCT test result representing a higher risk for 28-day all-cause mortality of patients diagnosed with severe sepsis for septic shock.    Change in PCT >80%  A decrease of PCT levels of more than 80% defines a negative change in PCT result representing a lower risk for 28-day all-cause mortality of patients diagnosed with severe sepsis or septic shock.      HEPATITIS B SURFACE ANTIGEN - Normal   POCT GLUCOSE FINGERSTICK - Normal   POCT GLUCOSE FINGERSTICK - Normal   RAINBOW DRAW    Narrative:     The following orders were created for panel order [place] Draw.  Procedure                               Abnormality         Status                     ---------                               -----------         ------                     Green Top (Gel)[[id number]]                                  Final result               Lavender Top[[id number]]                                     Final result               Gold Top - SST[[id number]]                                   Final result               Light Blue Top[[id number]]                                   Final result                 Please view results for these tests on the individual orders.   POCT GLUCOSE FINGERSTICK   POCT GLUCOSE FINGERSTICK   POCT GLUCOSE FINGERSTICK   POCT GLUCOSE FINGERSTICK   POCT GLUCOSE FINGERSTICK   POCT " GLUCOSE FINGERSTICK   POCT GLUCOSE FINGERSTICK   TYPE AND SCREEN   PREPARE RBC   PREVIOUS HISTORY   CBC AND DIFFERENTIAL    Narrative:     The following orders were created for panel order CBC & Differential.  Procedure                               Abnormality         Status                     ---------                               -----------         ------                     CBC Auto Differential[[id number]]        Abnormal            Final result                 Please view results for these tests on the individual orders.   GREEN TOP   LAVENDER TOP   GOLD TOP - SST   LIGHT BLUE TOP       XR Chest 1 View   Final Result   Cardiomegaly . Pulmonary vascular prominence and   right-sided effusion both more pronounced than on prior   examination. Unfavorable change.      Electronically signed by:  Hugo Russo MD  3/14/2022 3:12 PM CDT   Workstation: [id number]      XR Chest 1 View    (Results Pending)                                          MDM    Final diagnoses:   Precordial pain   Congestive heart failure, unspecified HF chronicity, unspecified heart failure type (HCC)       ED Disposition  ED Disposition     ED Disposition   Decision to Admit    Condition   --    Comment   Level of Care: Telemetry [5]   Diagnosis: Chest pain [[id number]]   Admitting Physician: TARIQ CONDON [[id number]]   Attending Physician: TARIQ CONDON [[id number]]               No follow-up provider specified.       Medication List      No changes were made to your prescriptions during this visit.          Russell Mccray MD  03/14/22 1851       Russell Mccray MD  03/14/22 2026       Russell Mccray MD  03/16/22 [id number]

## 2022-03-14 NOTE — OUTREACH NOTE
Medical Week 2 Survey    Flowsheet Row Responses   [facility] facility patient discharged from? [place]   Does the patient have one of the following disease processes/diagnoses(primary or secondary)? Other   Week 2 attempt successful? No   Unsuccessful attempts Attempt 2          GARY BOWLES - Registered Nurse

## 2022-03-15 ENCOUNTER — HOME CARE VISIT (OUTPATIENT)
Dept: HOME HEALTH SERVICES | Facility: HOME HEALTHCARE | Age: 62
End: 2022-03-15

## 2022-03-15 ENCOUNTER — READMISSION MANAGEMENT (OUTPATIENT)
Dept: CALL CENTER | Facility: HOSPITAL | Age: 62
End: 2022-03-15

## 2022-03-15 ENCOUNTER — HOME CARE VISIT (OUTPATIENT)
Dept: HOME HEALTH SERVICES | Facility: CLINIC | Age: 62
End: 2022-03-15

## 2022-03-15 PROBLEM — I50.33 ACUTE ON CHRONIC DIASTOLIC CONGESTIVE HEART FAILURE: Status: ACTIVE | Noted: 2021-10-11

## 2022-03-15 PROBLEM — R07.9 CHEST PAIN: Status: RESOLVED | Noted: 2022-03-14 | Resolved: 2022-03-15

## 2022-03-15 LAB
ALBUMIN SERPL-MCNC: 3.6 G/DL (ref 3.5–5.2)
ALBUMIN/GLOB SERPL: 0.9 G/DL
ALP SERPL-CCNC: 215 U/L (ref 39–117)
ALT SERPL W P-5'-P-CCNC: 8 U/L (ref 1–33)
ANION GAP SERPL CALCULATED.3IONS-SCNC: 11 MMOL/L (ref 5–15)
AST SERPL-CCNC: 11 U/L (ref 1–32)
BASOPHILS # BLD AUTO: 0.07 10*3/MM3 (ref 0–0.2)
BASOPHILS NFR BLD AUTO: 1 % (ref 0–1.5)
BILIRUB SERPL-MCNC: 0.5 MG/DL (ref 0–1.2)
BUN SERPL-MCNC: 58 MG/DL (ref 8–23)
BUN/CREAT SERPL: 15.3 (ref 7–25)
CALCIUM SPEC-SCNC: 9 MG/DL (ref 8.6–10.5)
CHLORIDE SERPL-SCNC: 95 MMOL/L (ref 98–107)
CO2 SERPL-SCNC: 25 MMOL/L (ref 22–29)
CREAT SERPL-MCNC: 3.79 MG/DL (ref 0.57–1)
DEPRECATED RDW RBC AUTO: 50.4 FL (ref 37–54)
EGFRCR SERPLBLD CKD-EPI 2021: 12.8 ML/MIN/1.73
EOSINOPHIL # BLD AUTO: 0.24 10*3/MM3 (ref 0–0.4)
EOSINOPHIL NFR BLD AUTO: 3.5 % (ref 0.3–6.2)
ERYTHROCYTE [DISTWIDTH] IN BLOOD BY AUTOMATED COUNT: 15.4 % (ref 12.3–15.4)
GLOBULIN UR ELPH-MCNC: 4.2 GM/DL
GLUCOSE BLDC GLUCOMTR-MCNC: 82 MG/DL (ref 70–130)
GLUCOSE BLDC GLUCOMTR-MCNC: 86 MG/DL (ref 70–130)
GLUCOSE SERPL-MCNC: 78 MG/DL (ref 65–99)
HBV SURFACE AG SERPL QL IA: NORMAL
HCT VFR BLD AUTO: 26.9 % (ref 34–46.6)
HGB BLD-MCNC: 8.4 G/DL (ref 12–15.9)
IMM GRANULOCYTES # BLD AUTO: 0.04 10*3/MM3 (ref 0–0.05)
IMM GRANULOCYTES NFR BLD AUTO: 0.6 % (ref 0–0.5)
LYMPHOCYTES # BLD AUTO: 1.51 10*3/MM3 (ref 0.7–3.1)
LYMPHOCYTES NFR BLD AUTO: 21.7 % (ref 19.6–45.3)
MCH RBC QN AUTO: 28.4 PG (ref 26.6–33)
MCHC RBC AUTO-ENTMCNC: 31.2 G/DL (ref 31.5–35.7)
MCV RBC AUTO: 90.9 FL (ref 79–97)
MONOCYTES # BLD AUTO: 0.55 10*3/MM3 (ref 0.1–0.9)
MONOCYTES NFR BLD AUTO: 7.9 % (ref 5–12)
NEUTROPHILS NFR BLD AUTO: 4.54 10*3/MM3 (ref 1.7–7)
NEUTROPHILS NFR BLD AUTO: 65.3 % (ref 42.7–76)
NRBC BLD AUTO-RTO: 0 /100 WBC (ref 0–0.2)
PLATELET # BLD AUTO: 277 10*3/MM3 (ref 140–450)
PMV BLD AUTO: 8.9 FL (ref 6–12)
POTASSIUM SERPL-SCNC: 4 MMOL/L (ref 3.5–5.2)
PROCALCITONIN SERPL-MCNC: 0.15 NG/ML (ref 0–0.25)
PROT SERPL-MCNC: 7.8 G/DL (ref 6–8.5)
QT INTERVAL: 494 MS
QTC INTERVAL: 513 MS
RBC # BLD AUTO: 2.96 10*6/MM3 (ref 3.77–5.28)
SODIUM SERPL-SCNC: 131 MMOL/L (ref 136–145)
WBC NRBC COR # BLD: 6.95 10*3/MM3 (ref 3.4–10.8)

## 2022-03-15 PROCEDURE — 97162 PT EVAL MOD COMPLEX 30 MIN: CPT

## 2022-03-15 PROCEDURE — 94664 DEMO&/EVAL PT USE INHALER: CPT

## 2022-03-15 PROCEDURE — 86900 BLOOD TYPING SEROLOGIC ABO: CPT

## 2022-03-15 PROCEDURE — 85025 COMPLETE CBC W/AUTO DIFF WBC: CPT | Performed by: STUDENT IN AN ORGANIZED HEALTH CARE EDUCATION/TRAINING PROGRAM

## 2022-03-15 PROCEDURE — G0378 HOSPITAL OBSERVATION PER HR: HCPCS

## 2022-03-15 PROCEDURE — 82962 GLUCOSE BLOOD TEST: CPT

## 2022-03-15 PROCEDURE — 63710000001 INSULIN ASPART PER 5 UNITS: Performed by: STUDENT IN AN ORGANIZED HEALTH CARE EDUCATION/TRAINING PROGRAM

## 2022-03-15 PROCEDURE — 84145 PROCALCITONIN (PCT): CPT | Performed by: STUDENT IN AN ORGANIZED HEALTH CARE EDUCATION/TRAINING PROGRAM

## 2022-03-15 PROCEDURE — 94799 UNLISTED PULMONARY SVC/PX: CPT

## 2022-03-15 PROCEDURE — 25010000002 HEPARIN (PORCINE) PER 1000 UNITS: Performed by: INTERNAL MEDICINE

## 2022-03-15 PROCEDURE — P9016 RBC LEUKOCYTES REDUCED: HCPCS

## 2022-03-15 PROCEDURE — 94640 AIRWAY INHALATION TREATMENT: CPT

## 2022-03-15 PROCEDURE — 63710000001 INSULIN DETEMIR PER 5 UNITS: Performed by: STUDENT IN AN ORGANIZED HEALTH CARE EDUCATION/TRAINING PROGRAM

## 2022-03-15 PROCEDURE — 94760 N-INVAS EAR/PLS OXIMETRY 1: CPT

## 2022-03-15 PROCEDURE — 97166 OT EVAL MOD COMPLEX 45 MIN: CPT

## 2022-03-15 PROCEDURE — 36415 COLL VENOUS BLD VENIPUNCTURE: CPT | Performed by: STUDENT IN AN ORGANIZED HEALTH CARE EDUCATION/TRAINING PROGRAM

## 2022-03-15 PROCEDURE — 80053 COMPREHEN METABOLIC PANEL: CPT | Performed by: STUDENT IN AN ORGANIZED HEALTH CARE EDUCATION/TRAINING PROGRAM

## 2022-03-15 PROCEDURE — G0257 UNSCHED DIALYSIS ESRD PT HOS: HCPCS

## 2022-03-15 PROCEDURE — 99225 PR SBSQ OBSERVATION CARE/DAY 25 MINUTES: CPT | Performed by: STUDENT IN AN ORGANIZED HEALTH CARE EDUCATION/TRAINING PROGRAM

## 2022-03-15 RX ORDER — HEPARIN SODIUM 1000 [USP'U]/ML
4000 INJECTION, SOLUTION INTRAVENOUS; SUBCUTANEOUS AS NEEDED
Status: DISCONTINUED | OUTPATIENT
Start: 2022-03-15 | End: 2022-03-17 | Stop reason: HOSPADM

## 2022-03-15 RX ORDER — GABAPENTIN 400 MG/1
800 CAPSULE ORAL 3 TIMES DAILY
Status: DISCONTINUED | OUTPATIENT
Start: 2022-03-15 | End: 2022-03-17 | Stop reason: HOSPADM

## 2022-03-15 RX ORDER — ACETAMINOPHEN 325 MG/1
650 TABLET ORAL EVERY 6 HOURS PRN
Status: DISCONTINUED | OUTPATIENT
Start: 2022-03-15 | End: 2022-03-17 | Stop reason: HOSPADM

## 2022-03-15 RX ORDER — ALBUMIN (HUMAN) 12.5 G/50ML
12.5 SOLUTION INTRAVENOUS AS NEEDED
Status: ACTIVE | OUTPATIENT
Start: 2022-03-15 | End: 2022-03-15

## 2022-03-15 RX ORDER — SODIUM CHLORIDE 9 MG/ML
INJECTION, SOLUTION INTRAVENOUS
Status: COMPLETED
Start: 2022-03-15 | End: 2022-03-15

## 2022-03-15 RX ADMIN — ISOSORBIDE MONONITRATE 30 MG: 30 TABLET, EXTENDED RELEASE ORAL at 08:09

## 2022-03-15 RX ADMIN — ATORVASTATIN CALCIUM 20 MG: 20 TABLET, FILM COATED ORAL at 21:07

## 2022-03-15 RX ADMIN — GABAPENTIN 800 MG: 400 CAPSULE ORAL at 08:57

## 2022-03-15 RX ADMIN — LIDOCAINE 1 PATCH: 50 PATCH CUTANEOUS at 08:11

## 2022-03-15 RX ADMIN — RANOLAZINE 500 MG: 500 TABLET, FILM COATED, EXTENDED RELEASE ORAL at 08:10

## 2022-03-15 RX ADMIN — DOCUSATE SODIUM 50 MG AND SENNOSIDES 8.6 MG 2 TABLET: 8.6; 5 TABLET, FILM COATED ORAL at 08:10

## 2022-03-15 RX ADMIN — METOPROLOL TARTRATE 25 MG: 25 TABLET, FILM COATED ORAL at 21:07

## 2022-03-15 RX ADMIN — FERROUS SULFATE TAB EC 324 MG (65 MG FE EQUIVALENT) 324 MG: 324 (65 FE) TABLET DELAYED RESPONSE at 08:10

## 2022-03-15 RX ADMIN — MONTELUKAST 10 MG: 10 TABLET, FILM COATED ORAL at 21:06

## 2022-03-15 RX ADMIN — LEVOTHYROXINE SODIUM 25 MCG: 25 TABLET ORAL at 08:10

## 2022-03-15 RX ADMIN — IPRATROPIUM BROMIDE AND ALBUTEROL SULFATE 3 ML: 2.5; .5 SOLUTION RESPIRATORY (INHALATION) at 19:37

## 2022-03-15 RX ADMIN — HEPARIN SODIUM 4000 UNITS: 1000 INJECTION INTRAVENOUS; SUBCUTANEOUS at 19:26

## 2022-03-15 RX ADMIN — INSULIN DETEMIR 15 UNITS: 100 INJECTION, SOLUTION SUBCUTANEOUS at 21:08

## 2022-03-15 RX ADMIN — BUMETANIDE 2 MG: 1 TABLET ORAL at 08:10

## 2022-03-15 RX ADMIN — METOPROLOL TARTRATE 25 MG: 25 TABLET, FILM COATED ORAL at 08:11

## 2022-03-15 RX ADMIN — IPRATROPIUM BROMIDE AND ALBUTEROL SULFATE 3 ML: 2.5; .5 SOLUTION RESPIRATORY (INHALATION) at 14:42

## 2022-03-15 RX ADMIN — OXYBUTYNIN CHLORIDE 5 MG: 5 TABLET, EXTENDED RELEASE ORAL at 08:10

## 2022-03-15 RX ADMIN — INSULIN ASPART 4 UNITS: 100 INJECTION, SOLUTION INTRAVENOUS; SUBCUTANEOUS at 11:31

## 2022-03-15 RX ADMIN — RANOLAZINE 500 MG: 500 TABLET, FILM COATED, EXTENDED RELEASE ORAL at 21:06

## 2022-03-15 RX ADMIN — BUDESONIDE AND FORMOTEROL FUMARATE DIHYDRATE 2 PUFF: 160; 4.5 AEROSOL RESPIRATORY (INHALATION) at 19:37

## 2022-03-15 RX ADMIN — SEVELAMER CARBONATE 800 MG: 800 TABLET, FILM COATED ORAL at 08:10

## 2022-03-15 RX ADMIN — IPRATROPIUM BROMIDE AND ALBUTEROL SULFATE 3 ML: 2.5; .5 SOLUTION RESPIRATORY (INHALATION) at 07:00

## 2022-03-15 RX ADMIN — DOCUSATE SODIUM 50 MG AND SENNOSIDES 8.6 MG 2 TABLET: 8.6; 5 TABLET, FILM COATED ORAL at 21:06

## 2022-03-15 RX ADMIN — GABAPENTIN 800 MG: 400 CAPSULE ORAL at 21:07

## 2022-03-15 RX ADMIN — SODIUM BICARBONATE 1300 MG: 650 TABLET ORAL at 08:10

## 2022-03-15 RX ADMIN — Medication 10 ML: at 21:07

## 2022-03-15 RX ADMIN — NYSTATIN: 100000 POWDER TOPICAL at 16:14

## 2022-03-15 RX ADMIN — SEVELAMER CARBONATE 800 MG: 800 TABLET, FILM COATED ORAL at 11:25

## 2022-03-15 RX ADMIN — BUDESONIDE AND FORMOTEROL FUMARATE DIHYDRATE 2 PUFF: 160; 4.5 AEROSOL RESPIRATORY (INHALATION) at 07:00

## 2022-03-15 RX ADMIN — ACETAMINOPHEN 650 MG: 325 TABLET, FILM COATED ORAL at 08:57

## 2022-03-15 RX ADMIN — SODIUM CHLORIDE: 9 INJECTION, SOLUTION INTRAVENOUS at 02:20

## 2022-03-15 RX ADMIN — Medication 10 ML: at 08:13

## 2022-03-15 RX ADMIN — IPRATROPIUM BROMIDE AND ALBUTEROL SULFATE 3 ML: 2.5; .5 SOLUTION RESPIRATORY (INHALATION) at 10:57

## 2022-03-15 RX ADMIN — PANTOPRAZOLE SODIUM 40 MG: 40 TABLET, DELAYED RELEASE ORAL at 21:06

## 2022-03-15 RX ADMIN — CLOPIDOGREL BISULFATE 75 MG: 75 TABLET ORAL at 08:09

## 2022-03-15 NOTE — H&P
HISTORY AND PHYSICAL  NAME: Yamileth Slater  : 1960  MRN: [id number]    DATE OF ADMISSION:  3/14/2022     DATE & TIME SEEN: 22 at 1900    PCP: Rianna Macias MD    CODE STATUS: Full code    CHIEF COMPLAINT:  Chest pain/SOA    HPI:  Yamileth Slater is a 62 y.o. female with a CMH of COPD/MIKE (on 3L O2 at home and trilogy machine), ESRD with dialysis M,W,F, anemia of CD, CAD s/p CABG, DMII requiring insulin, debility, HTN, HLD, GERD who presents complaining of shortness of breath and chest pain.  Shortness of breath and chest pain started approximately 2 days ago.  Chest pain is described as dull to sharp pain throughout most of her chest.  She is unable to localize it single spot.  There is no radiation or associated symptoms.  She has not noticed changes with exertion.  She has had a CABG in the past as well as carotid stenting done.  Patient reports continuing to take her cardiac medications.  Her last echo was 2022, cardiac cath 2021.    Patient has worsening shortness of breath over the last couple days.  She remains on 3 L oxygen at home.  Denies cough.  Has had some wheezing and congestion.  She has somewhat recently started smoking cigarettes again.  She was due to get dialysis today but came to the emergency department prior to her scheduled dialysis due to this shortness of breath.  She has noticed some leg swelling although this is somewhat chronic.  Equal bilaterally.  Patient reports still making good urine on nondialysis days.    In the emergency department she was found to be anemic down to a hemoglobin of 7.1.  Her creatinine was up to 3.9 with a GFR around 12.  Chest x-ray was obtained and showed pulmonary vascular prominence and right-sided effusion both more pronounced than on prior examination.  Patient was hypertensive with the greatest systolic being over 180 but trended down without any medical intervention.  Her vitals were otherwise stable.   Nephrology was consulted from emergency department.  Patient was admitted for anemia, ESRD and pulmonary effusion.    CONCURRENT MEDICAL HISTORY:  Past Medical History:   Diagnosis Date   • Acute blood loss anemia 4/16/2017    Likely due to gastric oozing at this time. - Dr. Duarte (GI) was consulted and has now signed off, will follow up outpatient - pill colonoscopy showed AVMs - continue to monitor   • Acute cystitis with hematuria 3/31/2021    1/13: IV Rocephin 1 gm q 24 1/14 : transitioned  to omnicef 300mg. Urine cultures resulted and did not show growth. Omnicef discontinued as patient is asymptomatic   • Altered mental status 1/9/2022    - AMS on presentation - initial ABG pH 7.3, CO2 34 - Procal 0.29 - UA negative for acute cysitits -CTA head wnl  - Empiric Zosyn and Vancomycin -Lactate 2.5 on admission  - blood cultures no growth at 24 hours     • Anxiety    • CAD (coronary artery disease) 4/24/2021    S/P 3 stents 5/1/2021 for [facility] Continue ASA 81mg & Clopidogrel 75mg Continue Atorvastatin 40mg   • Carotid artery stenosis    • Chronic obstructive lung disease (HCC)    • CKD (chronic kidney disease) stage 4, GFR 15-29 ml/min ([facility])    • CKD (chronic kidney disease), symptom management only, stage 5 (HCC) 10/5/2020    Results from last 7 days Lab Units 12/15/21 0548 12/14/21 1323 12/14/21 0916 CREATININE mg/dL 3.92* 3.21* 3.32*  Baseline creatinine 2-3 GFR 13-25 GFR 15 Dialysis MWF, sees Dr. Lauren Nephrology consult,, appreciate recommendations Continue Bumex 1mg bid daily Holding Bumex 2mg 4 times a week   • Colonic polyp    • Coronary arteriosclerosis    • Diabetes mellitus (HCC)    • Diabetic neuropathy (HCC)    • Ear pain, right 10/18/2021    - canal trauma due to patient scratching and DMT2 - added cortisporin ear drops   • Elevated troponin 10/12/2021    -most likely from CKD -Trending down -Neg chest pain   • GERD (gastroesophageal reflux disease)    • GI bleed 5/13/2021    - GI will follow up  outpatient - Protonix 40mg daily - Avoid medical DVT prophy and use mechanical at this time instead. - Continue to monitor - pill colonoscopy results showed AVMs   • History of transfusion    • Hypercholesterolemia    • Hypertension    • Hypomagnesemia 6/27/2021    Monitor and replace   • Morbid obesity ([facility])    • Nephrolithiasis    • Peripheral vascular disease ([facility])    • SIRS (systemic inflammatory response syndrome) ([facility]) 1/9/2022    Admission  - WBC 17.78   -   - RR 16 - 1/10: VSS/wnl - CXR - Mild pulm edema - Blood cultures no growth at 24 hours  - Procalcitonin 0.29 - UA : glucose 1000, negative Leucocytes/nitrate - Empiric Zosyn and Vancomcyin    • Sleep apnea    • Substance abuse ([facility])    • Vitamin D deficiency        PAST SURGICAL HISTORY:  Past Surgical History:   Procedure Laterality Date   • CARDIAC CATHETERIZATION N/A 7/14/2020   • CARDIAC CATHETERIZATION N/A 4/23/2021    Procedure: Left Heart Cath;  Surgeon: Melba Romo MD;  Location: [facility] CATH INVASIVE LOCATION;  Service: Cardiology;  Laterality: N/A;   • CARDIAC CATHETERIZATION N/A 4/30/2021    Procedure: Percutaneous Coronary Intervention;  Surgeon: Russell Voss MD;  Location: [facility] CATH INVASIVE LOCATION;  Service: Cardiovascular;  Laterality: N/A;   • CARDIAC CATHETERIZATION N/A 4/30/2021    Procedure: Stent NIKKI coronary;  Surgeon: Russell Voss MD;  Location: [facility] CATH INVASIVE LOCATION;  Service: Cardiovascular;  Laterality: N/A;   • CARDIAC CATHETERIZATION Left 11/13/2021    Procedure: Left Heart Cath;  Surgeon: Niall Rios MD;  Location: [facility] CATH INVASIVE LOCATION;  Service: Cardiology;  Laterality: Left;   • CAROTID STENT Left    • COLONOSCOPY     • COLONOSCOPY N/A 5/14/2021    Procedure: COLONOSCOPY;  Surgeon: Mingo Duarte MD;  Location: [facility] ENDOSCOPY;  Service: Gastroenterology;  Laterality: N/A;   • CORONARY ARTERY BYPASS GRAFT N/A 2013    CABG X 3   • CYSTOSCOPY BLADDER STONE  LITHOTRIPSY Bilateral    • ENDOSCOPY N/A 4/12/2021    Procedure: ESOPHAGOGASTRODUODENOSCOPY;  Surgeon: Mingo Duarte MD;  Location: [facility] ENDOSCOPY;  Service: Gastroenterology;  Laterality: N/A;   • ENDOSCOPY N/A 5/14/2021    Procedure: ESOPHAGOGASTRODUODENOSCOPY;  Surgeon: Mingo Duarte MD;  Location: [facility] ENDOSCOPY;  Service: Gastroenterology;  Laterality: N/A;   • ENDOSCOPY N/A 1/28/2022    Procedure: ESOPHAGOGASTRODUODENOSCOPY;  Surgeon: Mingo Duarte MD;  Location: [facility] ENDOSCOPY;  Service: Gastroenterology;  Laterality: N/A;   • INTERVENTIONAL RADIOLOGY PROCEDURE N/A 10/21/2021    Procedure: tunneled central venous catheter placement;  Surgeon: Donnie Robles MD;  Location: [facility] ANGIO INVASIVE LOCATION;  Service: Interventional Radiology;  Laterality: N/A;   • INTERVENTIONAL RADIOLOGY PROCEDURE N/A 1/27/2022    Procedure: tunneled central venous catheter placement;  Surgeon: Donnie Robles MD;  Location: [facility] ANGIO INVASIVE LOCATION;  Service: Interventional Radiology;  Laterality: N/A;       FAMILY HISTORY:  Family History   Problem Relation Age of Onset   • Heart disease Mother    • Lung cancer Mother    • Heart disease Father    • Heart attack Father    • Diabetes Father    • Heart disease Half-Sister         Dad's side   • Heart disease Brother    • No Known Problems Sister    • No Known Problems Sister    • No Known Problems Sister    • No Known Problems Sister    • No Known Problems Sister    • Pancreatic cancer Half-Sister         Dad's side   • No Known Problems Brother    • No Known Problems Brother    • Heart attack Half-Brother    • Heart attack Half-Brother    • No Known Problems Maternal Grandmother    • No Known Problems Maternal Grandfather    • No Known Problems Paternal Grandmother    • No Known Problems Paternal Grandfather         SOCIAL HISTORY:  Social History     Socioeconomic History   • Marital status:      Spouse name: wesley dumont    Tobacco Use   • Smoking status: Former Smoker     Packs/day: 0.25     Years: 46.00     Pack years: 11.50     Types: Cigarettes     Start date: 1999     Quit date: 2017     Years since quittin.1   • Smokeless tobacco: Never Used   • Tobacco comment: only smoking 5 a day - quit 2021   Substance and Sexual Activity   • Alcohol use: No   • Drug use: Not Currently     Types: LSD, Marijuana, Methamphetamines   • Sexual activity: Not Currently       HOME MEDICATIONS:  Prior to Admission medications    Medication Sig Start Date End Date Taking? Authorizing Provider   acetaminophen (Tylenol 8 Hour) 650 MG 8 hr tablet Take 1 tablet by mouth Every 8 (Eight) Hours As Needed for Mild Pain . 22   Blake Burris MD   albuterol (PROVENTIL) (2.5 MG/3ML) 0.083% nebulizer solution Inhale the contents of 1 vial by nebulization Every 4 (Four) Hours As Needed for Wheezing. 10/28/21   Haily Mcneil MD   albuterol sulfate  (90 Base) MCG/ACT inhaler Inhale 2 puffs Every 4 (Four) Hours As Needed for Wheezing. 3/26/21   Nirmal Calvillo MD   aspirin (aspirin) 81 MG EC tablet Take 1 tablet by mouth Daily. 21   Jr Jaime Estrada MD   atorvastatin (LIPITOR) 20 MG tablet Take 1 tablet by mouth every night at bedtime. 3/3/22   Umesh Liz MD   Blood Glucose Monitoring Suppl ([employer] Blood Glucose Meter) w/Device kit 1 each 3 (Three) Times a Day. 10/9/20   Nirmal Calvillo MD   bumetanide (BUMEX) 2 MG tablet Take 1 tablet by mouth 4 (Four) Times a Week.  Patient taking differently: Take 2 mg by mouth 4 (Four) Times a Week. Pt states she takes medication on , Monday, Wednesday and Friday. 22   Paulina Solano MD   Capsaicin 0.035 % cream Apply 3 g topically 3 (Three) Times a Day As Needed (ankle pain). 3/3/22   Umesh Liz MD   clopidogrel (PLAVIX) 75 MG tablet Take 1 tablet by mouth Daily. 3/26/21   Nirmal Calvillo MD   Continuous Blood Gluc   "(FreeStyle Jade 2 [place]) device 1 each Continuous. 3/31/21   Nirmal Calvillo MD   Continuous Blood Gluc Sensor (FreeStyle Jade 2 Sensor) misc 1 each Every 14 (Fourteen) Days. 2/1/22   Blake Burris MD   cyclobenzaprine (FLEXERIL) 5 MG tablet Take 10 mg by mouth.    Caio Thompson MD   Diclofenac Sodium (VOLTAREN) 1 % gel gel Apply 4 g topically to the appropriate area as directed 4 (Four) Times a Day As Needed (Ankle pain). 3/3/22   Umesh Liz MD   DULoxetine (CYMBALTA) 20 MG capsule Take 1 capsule by mouth Daily for 30 days. 3/3/22 4/2/22  Umesh Liz MD   Easy Touch Insulin Syringe 30G X 5/16\" 0.5 ML misc USE AS DIRECTED WITH LEVEMIR 12/1/21   Caio Thompson MD   EASY TOUCH PEN NEEDLES 31G X 8 MM misc  8/7/20   Caio Thompson MD   epoetin hubert-epbx (RETACRIT) 27958 UNIT/ML injection Inject 1 mL under the skin into the appropriate area as directed 3 (Three) Times a Week. Indications: ESRD on Dialysis 11/17/21   Alvarado Mauricio MD   ferrous sulfate (FeroSul) 325 (65 FE) MG tablet Take 1 tablet by mouth Daily With Breakfast. 3/26/21   Nirmal Calvillo MD   glucose blood test strip Use as instructed 10/9/20   Nirmal Calvillo MD   insulin aspart (novoLOG FLEXPEN) 100 UNIT/ML solution pen-injector sc pen Inject 30 units in addition to sliding scale on sheet of paper (0-24 Units) under the skin as directed 3 (three) times daily with meals. 1/19/22   Paulina Solano MD   insulin aspart (novoLOG FLEXPEN) 100 UNIT/ML solution pen-injector sc pen Inject 30 Units under the skin into the appropriate area as directed 3 (Three) Times a Day With Meals. 2/1/22   Blake Burris MD   insulin detemir (LEVEMIR) 100 UNIT/ML injection Inject 30 Units under the skin into the appropriate area as directed Every morning and 35 units every night. 2/1/22   Blake Burris MD   Insulin Pen Needle (NovoFine) 30G X 8 MM misc As directed 4 times daily 3/26/21   " Nirmal Calvillo MD   Insulin Pen Needle 31G X 8 MM misc Use to inject insulin 4 (Four) Times a Day as directed. 10/28/21   Haily Mcenil MD   ipratropium-albuterol (DUO-NEB) 0.5-2.5 mg/3 ml nebulizer Take 3 mL by nebulization 4 (Four) Times a Day. 3/22/17   Caio Thompson MD   isosorbide mononitrate (IMDUR) 30 MG 24 hr tablet Take 1 tablet by mouth Every Morning. 3/3/22   Umesh Liz MD   levothyroxine (SYNTHROID, LEVOTHROID) 25 MCG tablet Take 25 mcg by mouth Daily. 9/1/21   Yadira Delarosa MD   lidocaine (LIDODERM) 5 % APPLY TO THE AFFECTED AREA(S) TOPICALLY TWO TIMES A DAY. REMOVE NIGHTLY 10/8/21   Blake Burris MD   lisinopril (PRINIVIL,ZESTRIL) 5 MG tablet Take 1 tablet by mouth Daily. 3/3/22   Umesh Liz MD   metoprolol tartrate (LOPRESSOR) 25 MG tablet Take 1 tablet by mouth Every 12 (Twelve) Hours. 1/19/22 5/19/22  Paulina Solano MD   Mirabegron ER (Myrbetriq) 25 MG tablet sustained-release 24 hour 24 hr tablet Take 25 mg by mouth Daily. 8/30/21   Caio Thompson MD   montelukast (SINGULAIR) 10 MG tablet Take 1 tablet by mouth Every Night. 3/26/21   Nirmal Calvillo MD   muscle rub (BenGay) 10-15 % cream cream Apply 1 application topically to the appropriate area as directed 4 (Four) Times a Day As Needed for buttock pain. 1/19/22   Paulina Solano MD   nitroglycerin (NITROSTAT) 0.4 MG SL tablet Place 0.4 mg under the tongue Every 5 (Five) Minutes As Needed for Chest Pain (x 3 doses). 1/1/15   Caio Thompson MD   nystatin (MYCOSTATIN) 005686 UNIT/GM powder Apply  topically to the appropriate area as directed 3 (Three) Times a Day. 10/1/21   Carline Coffey MD   O2 (OXYGEN) Inhale 3 L/min Continuous. 1/1/21   Provider, MD Caio   ondansetron ODT (Zofran ODT) 4 MG disintegrating tablet Place 1 tablet on the tongue Every 8 (Eight) Hours As Needed for Nausea or Vomiting. 3/26/21   Nirmal Calvillo MD   oxybutynin XL  (DITROPAN-XL) 5 MG 24 hr tablet Take 1 tablet by mouth Daily. 3/26/21   Nrimal Calvillo MD   pantoprazole (PROTONIX) 40 MG EC tablet Take 1 tablet by mouth Every Night. 3/26/21   Nirmal Calvillo MD   potassium chloride (MICRO-K) 10 MEQ CR capsule Take 10 mEq by mouth Daily. 1/26/22   Caio Thompson MD   ranolazine (RANEXA) 500 MG 12 hr tablet Take 1 tablet by mouth Every 12 (Twelve) Hours. 2/1/22   Blake Burris MD   sennosides-docusate (PERICOLACE) 8.6-50 MG per tablet Take 2 tablets by mouth 2 (Two) Times a Day. 10/28/21   Haily Mcneil MD   sevelamer (RENVELA) 800 MG tablet Take 800 mg by mouth 3 (Three) Times a Day With Meals. 1/26/22   Caio Thompson MD   sodium bicarbonate 650 MG tablet Take 2 tablets by mouth Daily. 2/24/22   Blake Burris MD       ALLERGIES:  Adhesive tape, Furosemide, Latex, Nsaids, and Other    REVIEW OF SYSTEMS  Review of Systems   Constitutional: Positive for activity change and fatigue. Negative for chills, fever and unexpected weight change.   HENT: Positive for ear pain (right ear) and postnasal drip. Negative for congestion and rhinorrhea.    Eyes: Negative for pain and visual disturbance.   Respiratory: Positive for shortness of breath and wheezing. Negative for cough, choking and chest tightness.    Cardiovascular: Positive for chest pain and leg swelling. Negative for palpitations.   Gastrointestinal: Negative for abdominal pain, diarrhea, nausea and vomiting.   Genitourinary: Positive for difficulty urinating (at her baseline). Negative for dysuria and hematuria.   Musculoskeletal: Positive for arthralgias, back pain and gait problem.   Skin: Positive for pallor. Negative for color change, rash and wound.   Neurological: Negative for dizziness, syncope, weakness, light-headedness and headaches.   Hematological: Negative for adenopathy. Does not bruise/bleed easily.       PHYSICAL EXAM:  Temp:  [97.7 °F (36.5 °C)] 97.7 °F (36.5 °C)  Heart  Rate:  [60-66] 60  Resp:  [16-24] 16  BP: (121-181)/(56-89) 121/56  Body mass index is 56.52 kg/m².  Physical Exam  Vitals and nursing note reviewed.   Constitutional:       Appearance: She is morbidly obese. She is not ill-appearing or toxic-appearing.   HENT:      Head: Normocephalic and atraumatic.      Right Ear: Tympanic membrane normal. There is no impacted cerumen.      Nose: Nose normal. No congestion.      Mouth/Throat:      Mouth: Mucous membranes are moist.      Pharynx: Oropharynx is clear. No oropharyngeal exudate.   Eyes:      General: No scleral icterus.     Extraocular Movements: Extraocular movements intact.      Conjunctiva/sclera: Conjunctivae normal.      Pupils: Pupils are equal, round, and reactive to light.   Cardiovascular:      Rate and Rhythm: Normal rate and regular rhythm.      Heart sounds: Normal heart sounds. No murmur heard.  Pulmonary:      Effort: Pulmonary effort is normal.      Breath sounds: Decreased air movement and transmitted upper airway sounds present. No decreased breath sounds or rhonchi.      Comments: Transmitted upper airway sounds. Repositioned patient to be sitting more upright and this improved.   Abdominal:      General: Bowel sounds are normal. There is no distension.      Palpations: Abdomen is soft.      Tenderness: There is no abdominal tenderness.   Musculoskeletal:      Cervical back: No rigidity.      Right lower leg: Edema (trace to below knee) present.      Left lower leg: Edema (trace to above knee) present.   Lymphadenopathy:      Cervical: No cervical adenopathy.   Skin:     General: Skin is warm and dry.      Capillary Refill: Capillary refill takes less than 2 seconds.      Coloration: Skin is pale. Skin is not jaundiced.   Neurological:      General: No focal deficit present.      Mental Status: She is alert and oriented to person, place, and time. Mental status is at baseline.      Cranial Nerves: No cranial nerve deficit.   Psychiatric:          Mood and Affect: Mood normal.         Behavior: Behavior normal.         DIAGNOSTIC DATA:   Lab Results (last 24 hours)     Procedure Component Value Units Date/Time    COVID-19 and FLU A/B PCR - Swab, Nasopharynx [[id number]]  (Normal) Collected: 03/14/22 1919    Specimen: Swab from Nasopharynx Updated: 03/1960     COVID19 Not Detected     Influenza A PCR Not Detected     Influenza B PCR Not Detected    Narrative:      Fact sheet for providers: https://www.fda.gov/media/688617/download    Fact sheet for patients: https://www.fda.gov/media/410481/download    Test performed by PCR.    Lipase [[id number]]  (Normal) Collected: 03/14/22 1914    Specimen: Blood Updated: 03/14/22 1941     Lipase 36 U/L     CK [[id number]]  (Normal) Collected: 03/14/22 1914    Specimen: Blood Updated: 03/14/22 1941     Creatine Kinase 46 U/L     Magnesium [[id number]]  (Normal) Collected: 03/14/22 1914    Specimen: Blood Updated: 03/14/22 1941     Magnesium 1.9 mg/dL     Troponin [[id number]]  (Normal) Collected: 03/14/22 1914    Specimen: Blood Updated: 03/14/22 1941     Troponin T 0.025 ng/mL     Narrative:      Troponin T Reference Range:  <= 0.03 ng/mL-   Negative for AMI  >0.03 ng/mL-     Abnormal for myocardial necrosis.  Clinicians would have to utilize clinical acumen, EKG, Troponin and serial changes to determine if it is an Acute Myocardial Infarction or myocardial injury due to an underlying chronic condition.       Results may be falsely decreased if patient taking Biotin.      [place]enton Draw [[id number]] Collected: 03/14/22 1752    Specimen: Blood Updated: 03/14/22 1903    Narrative:      The following orders were created for panel order [place]enton Draw.  Procedure                               Abnormality         Status                     ---------                               -----------         ------                     Green Top (Gel)[[id number]]                                  Final result               Lavender  Top[[id number]]                                     Final result               Gold Top - SST[[id number]]                                   Final result               Light Blue Top[[id number]]                                   Final result                 Please view results for these tests on the individual orders.    Green Top (Gel) [[id number]] Collected: 03/14/22 1752    Specimen: Blood Updated: 03/14/22 1903     Extra Tube Hold for add-ons.     Comment: Auto resulted.       Lavender Top [[id number]] Collected: 03/14/22 1752    Specimen: Blood Updated: 03/14/22 1903     Extra Tube hold for add-on     Comment: Auto resulted       Gold Top - SST [[id number]] Collected: 03/14/22 1752    Specimen: Blood Updated: 03/14/22 1903     Extra Tube Hold for add-ons.     Comment: Auto resulted.       Light Blue Top [[id number]] Collected: 03/14/22 1752    Specimen: Blood Updated: 03/14/22 1903     Extra Tube hold for add-on     Comment: Auto resulted       Troponin [[id number]]  (Normal) Collected: 03/14/22 1752    Specimen: Blood Updated: 03/14/22 1827     Troponin T 0.021 ng/mL     Narrative:      Troponin T Reference Range:  <= 0.03 ng/mL-   Negative for AMI  >0.03 ng/mL-     Abnormal for myocardial necrosis.  Clinicians would have to utilize clinical acumen, EKG, Troponin and serial changes to determine if it is an Acute Myocardial Infarction or myocardial injury due to an underlying chronic condition.       Results may be falsely decreased if patient taking Biotin.      Comprehensive Metabolic Panel [[id number]]  (Abnormal) Collected: 03/14/22 1752    Specimen: Blood Updated: 03/14/22 1827     Glucose 88 mg/dL      BUN 57 mg/dL      Creatinine 3.90 mg/dL      Sodium 136 mmol/L      Potassium 4.0 mmol/L      Chloride 98 mmol/L      CO2 23.0 mmol/L      Calcium 8.7 mg/dL      Total Protein 7.6 g/dL      Albumin 3.20 g/dL      ALT (SGPT) 9 U/L      AST (SGOT) 11 U/L      Alkaline Phosphatase 230 U/L      Total Bilirubin 0.4 mg/dL       Globulin 4.4 gm/dL      A/G Ratio 0.7 g/dL      BUN/Creatinine Ratio 14.6     Anion Gap 15.0 mmol/L      eGFR 12.4 mL/min/1.73      Comment: <15 Indicative of kidney failure       Narrative:      GFR Normal >60  Chronic Kidney Disease <60  Kidney Failure <15      BNP [[id number]]  (Abnormal) Collected: 03/14/22 1752    Specimen: Blood Updated: 03/14/22 1824     proBNP 5,964.0 pg/mL     Narrative:      Among patients with dyspnea, NT-proBNP is highly sensitive for the detection of acute congestive heart failure. In addition NT-proBNP of <300 pg/ml effectively rules out acute congestive heart failure with 99% negative predictive value.    Results may be falsely decreased if patient taking Biotin.      CBC & Differential [[id number]]  (Abnormal) Collected: 03/14/22 1752    Specimen: Blood Updated: 03/14/22 1806    Narrative:      The following orders were created for panel order CBC & Differential.  Procedure                               Abnormality         Status                     ---------                               -----------         ------                     CBC Auto Differential[[id number]]        Abnormal            Final result                 Please view results for these tests on the individual orders.    CBC Auto Differential [[id number]]  (Abnormal) Collected: 03/14/22 1752    Specimen: Blood Updated: 03/14/22 1806     WBC 7.60 10*3/mm3      RBC 2.54 10*6/mm3      Hemoglobin 7.1 g/dL      Hematocrit 22.6 %      MCV 89.0 fL      MCH 28.0 pg      MCHC 31.4 g/dL      RDW 15.7 %      RDW-SD 50.9 fl      MPV 8.8 fL      Platelets 263 10*3/mm3      Neutrophil % 65.1 %      Lymphocyte % 24.6 %      Monocyte % 5.8 %      Eosinophil % 3.0 %      Basophil % 0.8 %      Immature Grans % 0.7 %      Neutrophils, Absolute 4.95 10*3/mm3      Lymphocytes, Absolute 1.87 10*3/mm3      Monocytes, Absolute 0.44 10*3/mm3      Eosinophils, Absolute 0.23 10*3/mm3      Basophils, Absolute 0.06 10*3/mm3      Immature Grans,  Absolute 0.05 10*3/mm3      nRBC 0.0 /100 WBC            Imaging Results (Last 24 Hours)     Procedure Component Value Units Date/Time    XR Chest 1 View [[id number]] Collected: 03/14/22 1445     Updated: 03/14/22 1514    Narrative:      Chest x-ray single view.         CLINICAL INDICATION: Shortness of breath. Chest pain    COMPARISON: Chest February 24, 2022.    FINDINGS: Cardiac silhouette is enlarged in size. Pulmonary  vascularity is increased. Midline sternal sutures from prior  cardiac surgery.    Large-caliber tunneled central venous catheter right jugular  approach with catheter tip in the superior  vena cava in  satisfactory position. Moderate size right-sided pleural  effusion.      Impression:      Cardiomegaly . Pulmonary vascular prominence and  right-sided effusion both more pronounced than on prior  examination. Unfavorable change.    Electronically signed by:  Hugo Russo MD  3/14/2022 3:12 PM CDT  Workstation: [id number]            I reviewed the patient's new clinical results.    ASSESSMENT AND PLAN: This is a 62 y.o. female with:    Active Hospital Problems    Diagnosis  POA   • Chest pain [R07.9]  Yes     First troponin negative, trend  -Chest x-ray: Cardiomegaly, right-sided pleural effusion worse than previous exam  -EKG no ST segment changes  -ECHO: EF 65% (1/9/22)  - Cath: 11/8/21 Dr. Rios  - Continue home medicine, Imdur and Ranexa    Hemoglobin less than 8 and patient having chest pain.     • ESRD on hemodialysis (HCC) [N18.6, Z99.2]  Not Applicable     Cr 3.9, baseline around ~3  - MWF Dialysis patient - Nephrology, Dr. Lauren Consulted. Hemodialysis planned 3/15/2022       • Physical deconditioning [R53.81]  Yes     - PT/OT   - fall precautions      • Type 2 diabetes mellitus with kidney complication, with long-term current use of insulin (HCC) [E11.29, Z79.4]  Not Applicable     Glucose was 88 in ED  - Current orders: Levemir 15 tonight (30 in AM), aspart 12 prandial, high dose sliding  scale  - Likely increase her regimen but concerns with hypoglycemia at current glucose  - Gabapentin pending julian review  Past admission:  - Levemir 30units AM. 35 units PM  - Aspart 12 with meals, High dose SSI             • Anemia due to chronic kidney disease, on chronic dialysis (HCC) [N18.6, D63.1, Z99.2]  Not Applicable     7.1 Hgb at admission  - received 1 unit of PRBCs ordered --> f/u H/H    - down from 8 3 weeks ago  - Patient reports getting blood at OSH  - Discussed possibly getting more with dialysis 3/15 with Dr. Lauren       • (HFpEF) heart failure with preserved ejection fraction ([facility]) [I50.30]  Yes     pro-BNP > than 5900 on admission  - Echo 1/10/2022 : EF 61-65%  -  MWF Dialysis patient  - continue metoprolol, bumex  - Extra dose of bumex        • Chronic obstructive pulmonary disease (HCC) [J44.9]  Yes     Symbicort  - Duo nebs and albuterol as needed  - Continue montelukast and cetirizine  - 3L at home, continue here. Increase to maintain sats 88-92%       • Hypertension [I10]  Yes     - Metoprolol 25mg bid  -Hydralzine PRN > 170 sys            • GERD (gastroesophageal reflux disease) [K21.9]  Yes     Protonix 40mg PO  - Currently under good control with no complaints of heartburn     • Morbid obesity with BMI of 50.0-59.9, adult ([facility]) [E66.01, Z68.43]  Not Applicable     Body mass index is 56.52 kg/m².  - Consistent carb, cardiac, renal diet  - Patient will need to be encouraged to exercise and diet after discharge          DVT prophylaxis: SCDs/TEDs     Yamileth Slater and I have discussed pain goals for this hospitalization after reviewing her current clinical condition, medical history and prior pain experiences.  The goal is to keep the pain level tolerable.  To help achieve this, I plan to use PRN meds.    JULIAN #[id number], reviewed and consistent with patient reported medications.    Expected Length of Stay: 1-3 days    I discussed the patient's findings and my recommendations  with patient, family, nursing staff and primary care team.     Kit Brar MD is the attending on record at time of admission, He is aware of the patient's status and agrees with the above history and physical.       Jerman Coffman MD   PGY-2    [facility]  [address] [place] [address]  Office: [phone]    This document has been electronically signed by Jerman Coffman MD on March 14, 2022 20:47 CDT

## 2022-03-15 NOTE — CASE COMMUNICATION
Pt missed a physical therapy appt on 3/15/22 due to recent hospitalization at [facility] under observation.

## 2022-03-15 NOTE — ACP (ADVANCE CARE PLANNING)
I discussion with this patient about CODE STATUS.  She wishes to be full code.  Discussed specifically compressions, medications, cardioversion, intubation and ventilation.  Patient wants everything to be done in the event that she needs any of those interventions.  Patient wants her son to be a surrogate decision-maker if the need arises.       Jerman Coffman MD   PGY-2    [place] [address]  Office: [phone]    This document has been electronically signed by Jerman Coffman MD on March 14, 2022 19:52 CDT

## 2022-03-15 NOTE — PLAN OF CARE
Goal Outcome Evaluation:  Plan of Care Reviewed With: patient        Progress: improving  Outcome Evaluation: BP elevated.  No complaints of CP. Receiving one unit of PRBC's currently.

## 2022-03-15 NOTE — PLAN OF CARE
Goal Outcome Evaluation:  Plan of Care Reviewed With: patient           Outcome Evaluation: initial OT evaluation complete. co-eval with PT. pt was pleasant and cooperative throughout. BUE ROM WFL grossly. BUE strength and bilateral  strength are grossly 4/5. pt was supervision for bed mobility. CGA for transfers and functional mobility with use of RW. dependent for donning/doffing socks. recommend further skilled OT services to address functional mobility and ADL Deficits, as well as balance, strength, and endurance. recommend home with assistance and home health OT at discharge. goals established.

## 2022-03-15 NOTE — CONSULTS
[facility]  [address] [place] [address]. [id number]  T - [phone]  F - [phone]     Consultation         PATIENT  DEMOGRAPHICS   PATIENT NAME: Yamileth Slater                      PHYSICIAN: Ace Lauren MD  : 1960  MRN: [id number]    Subjective   SUBJECTIVE   Referring Provider: Dr ISAIAS Coffman  Reason for Consultation: esrd  History of present illness:      Ms. Yamileth Slater is a 61-year-old lady with a past medical history of COPD end-stage renal disease coronary artery disease diabetes mellitus type 2 hypertension obesity peripheral arterial disease came into the hospital with shortness of air and chest pain for the past 2 days.  She denies any radiation.  She denies any associated nausea vomiting or diaphoresis.  Denies any cough or phlegm.  She has history of coronary artery disease with coronary artery bypass grafting in  and multiple stents.  Patient is on  dialysis but unable to go to her treatment yesterday on Monday.    Case is discussed with me and she has received 1 unit of packed RBC along with Lasix.  We are going to arrange her hemodialysis today.  Her breathing is much more comfortable now.  She is currently residing at home and is on 3 L oxygen.    Past Medical History:   Diagnosis Date   • Acute blood loss anemia 2017    Likely due to gastric oozing at this time. - Dr. Duarte (GI) was consulted and has now signed off, will follow up outpatient - pill colonoscopy showed AVMs - continue to monitor   • Acute cystitis with hematuria 3/31/2021    1/13: IV Rocephin 1 gm q 24  : transitioned  to omnicef 300mg. Urine cultures resulted and did not show growth. Omnicef discontinued as patient is asymptomatic   • Altered mental status 2022    - AMS on presentation - initial ABG pH 7.3, CO2 34 - Procal 0.29 - UA negative for acute cysitits -CTA head wnl  - Empiric Zosyn and Vancomycin -Lactate 2.5 on admission  - blood cultures  no growth at 24 hours     • Anxiety    • CAD (coronary artery disease) 4/24/2021    S/P 3 stents 5/1/2021 for [facility] Continue ASA 81mg & Clopidogrel 75mg Continue Atorvastatin 40mg   • Carotid artery stenosis    • Chronic obstructive lung disease ([facility])    • CKD (chronic kidney disease) stage 4, GFR 15-29 ml/min ([facility])    • CKD (chronic kidney disease), symptom management only, stage 5 ([facility]) 10/5/2020    Results from last 7 days Lab Units 12/15/21 0548 12/14/21 1323 12/14/21 0916 CREATININE mg/dL 3.92* 3.21* 3.32*  Baseline creatinine 2-3 GFR 13-25 GFR 15 Dialysis MWF, sees Dr. Lauren Nephrology consult,, appreciate recommendations Continue Bumex 1mg bid daily Holding Bumex 2mg 4 times a week   • Colonic polyp    • Coronary arteriosclerosis    • Diabetes mellitus ([facility])    • Diabetic neuropathy ([facility])    • Ear pain, right 10/18/2021    - canal trauma due to patient scratching and DMT2 - added cortisporin ear drops   • Elevated troponin 10/12/2021    -most likely from CKD -Trending down -Neg chest pain   • GERD (gastroesophageal reflux disease)    • GI bleed 5/13/2021    - GI will follow up outpatient - Protonix 40mg daily - Avoid medical DVT prophy and use mechanical at this time instead. - Continue to monitor - pill colonoscopy results showed AVMs   • History of transfusion    • Hypercholesterolemia    • Hypertension    • Hypomagnesemia 6/27/2021    Monitor and replace   • Morbid obesity ([facility])    • Nephrolithiasis    • Peripheral vascular disease ([facility])    • SIRS (systemic inflammatory response syndrome) ([facility]) 1/9/2022    Admission  - WBC 17.78   -   - RR 16 - 1/10: VSS/wnl - CXR - Mild pulm edema - Blood cultures no growth at 24 hours  - Procalcitonin 0.29 - UA : glucose 1000, negative Leucocytes/nitrate - Empiric Zosyn and Vancomcyin    • Sleep apnea    • Substance abuse ([facility])    • Vitamin D deficiency      Past Surgical History:   Procedure Laterality Date   • CARDIAC CATHETERIZATION N/A 7/14/2020   • CARDIAC  CATHETERIZATION N/A 4/23/2021    Procedure: Left Heart Cath;  Surgeon: Melba Romo MD;  Location: [facility] CATH INVASIVE LOCATION;  Service: Cardiology;  Laterality: N/A;   • CARDIAC CATHETERIZATION N/A 4/30/2021    Procedure: Percutaneous Coronary Intervention;  Surgeon: Russell Voss MD;  Location: [facility] CATH INVASIVE LOCATION;  Service: Cardiovascular;  Laterality: N/A;   • CARDIAC CATHETERIZATION N/A 4/30/2021    Procedure: Stent NIKKI coronary;  Surgeon: Russell Voss MD;  Location: [facility] CATH INVASIVE LOCATION;  Service: Cardiovascular;  Laterality: N/A;   • CARDIAC CATHETERIZATION Left 11/13/2021    Procedure: Left Heart Cath;  Surgeon: Niall Rios MD;  Location: [facility] CATH INVASIVE LOCATION;  Service: Cardiology;  Laterality: Left;   • CAROTID STENT Left    • COLONOSCOPY     • COLONOSCOPY N/A 5/14/2021    Procedure: COLONOSCOPY;  Surgeon: Mingo Duarte MD;  Location: [facility] ENDOSCOPY;  Service: Gastroenterology;  Laterality: N/A;   • CORONARY ARTERY BYPASS GRAFT N/A 2013    CABG X 3   • CYSTOSCOPY BLADDER STONE LITHOTRIPSY Bilateral    • ENDOSCOPY N/A 4/12/2021    Procedure: ESOPHAGOGASTRODUODENOSCOPY;  Surgeon: Mingo Duarte MD;  Location: [facility] ENDOSCOPY;  Service: Gastroenterology;  Laterality: N/A;   • ENDOSCOPY N/A 5/14/2021    Procedure: ESOPHAGOGASTRODUODENOSCOPY;  Surgeon: Mingo Duarte MD;  Location: [facility] ENDOSCOPY;  Service: Gastroenterology;  Laterality: N/A;   • ENDOSCOPY N/A 1/28/2022    Procedure: ESOPHAGOGASTRODUODENOSCOPY;  Surgeon: Mingo Duarte MD;  Location: [facility] ENDOSCOPY;  Service: Gastroenterology;  Laterality: N/A;   • INTERVENTIONAL RADIOLOGY PROCEDURE N/A 10/21/2021    Procedure: tunneled central venous catheter placement;  Surgeon: Donnie Robles MD;  Location: [facility] ANGIO INVASIVE LOCATION;  Service: Interventional Radiology;  Laterality: N/A;   • INTERVENTIONAL RADIOLOGY PROCEDURE N/A 1/27/2022    Procedure:  tunneled central venous catheter placement;  Surgeon: Donnie Robles MD;  Location: [facility] ANGIO INVASIVE LOCATION;  Service: Interventional Radiology;  Laterality: N/A;     Family History   Problem Relation Age of Onset   • Heart disease Mother    • Lung cancer Mother    • Heart disease Father    • Heart attack Father    • Diabetes Father    • Heart disease Half-Sister         Dad's side   • Heart disease Brother    • No Known Problems Sister    • No Known Problems Sister    • No Known Problems Sister    • No Known Problems Sister    • No Known Problems Sister    • Pancreatic cancer Half-Sister         Dad's side   • No Known Problems Brother    • No Known Problems Brother    • Heart attack Half-Brother    • Heart attack Half-Brother    • No Known Problems Maternal Grandmother    • No Known Problems Maternal Grandfather    • No Known Problems Paternal Grandmother    • No Known Problems Paternal Grandfather      Social History     Tobacco Use   • Smoking status: Former Smoker     Packs/day: 0.25     Years: 46.00     Pack years: 11.50     Types: Cigarettes     Start date: 1999     Quit date: 2017     Years since quittin.1   • Smokeless tobacco: Never Used   • Tobacco comment: only smoking 5 a day - quit 2021   Substance Use Topics   • Alcohol use: No   • Drug use: Not Currently     Types: LSD, Marijuana, Methamphetamines     Allergies:  Adhesive tape, Latex, Nsaids, and Other     REVIEW OF SYSTEMS    Review of Systems   Constitutional: Negative for chills and fever.   Respiratory: Positive for shortness of breath. Negative for chest tightness.    Cardiovascular: Positive for chest pain and leg swelling.   Gastrointestinal: Negative for abdominal pain, diarrhea and nausea.   Genitourinary: Negative for dysuria, flank pain and hematuria.   Musculoskeletal: Negative for arthralgias.   Neurological: Negative for dizziness, syncope and weakness.   Psychiatric/Behavioral: Negative for  "agitation.       Objective   OBJECTIVE   Vital Signs  Temp:  [96.1 °F (35.6 °C)-97.4 °F (36.3 °C)] 97.2 °F (36.2 °C)  Heart Rate:  [57-67] 63  Resp:  [16-20] 20  BP: (121-197)/(56-91) 167/69    Flowsheet Rows      Flowsheet Row First Filed Value   Admission Height 157.5 cm (62\") Documented at 03/14/2022 1435   Admission Weight 140 kg (309 lb) Documented at 03/14/2022 1435             I/O last 3 completed shifts:  In: 881.3 [P.O.:340; Blood:541.3]  Out: 1100 [Urine:1100]    PHYSICAL EXAM    Physical Exam  Constitutional:       Appearance: She is well-developed.   HENT:      Head: Normocephalic.   Eyes:      Pupils: Pupils are equal, round, and reactive to light.   Cardiovascular:      Rate and Rhythm: Normal rate and regular rhythm.      Heart sounds: Normal heart sounds.   Pulmonary:      Effort: Pulmonary effort is normal.      Breath sounds: Wheezing and rales present.   Abdominal:      General: Bowel sounds are normal.      Palpations: Abdomen is soft.   Musculoskeletal:         General: Swelling present.   Skin:     Coloration: Skin is not jaundiced.   Neurological:      General: No focal deficit present.      Mental Status: She is alert and oriented to person, place, and time.         RESULTS   Results Review:    Results from last 7 days   Lab Units 03/15/22  0608 03/14/22  1752   SODIUM mmol/L 131* 136   POTASSIUM mmol/L 4.0 4.0   CHLORIDE mmol/L 95* 98   CO2 mmol/L 25.0 23.0   BUN mg/dL 58* 57*   CREATININE mg/dL 3.79* 3.90*   CALCIUM mg/dL 9.0 8.7   BILIRUBIN mg/dL 0.5 0.4   ALK PHOS U/L 215* 230*   ALT (SGPT) U/L 8 9   AST (SGOT) U/L 11 11   GLUCOSE mg/dL 78 88       Estimated Creatinine Clearance: 20.7 mL/min (A) (by C-G formula based on SCr of 3.79 mg/dL (H)).    Results from last 7 days   Lab Units 03/14/22  1914   MAGNESIUM mg/dL 1.9             Results from last 7 days   Lab Units 03/15/22  0608 03/14/22  1752   WBC 10*3/mm3 6.95 7.60   HEMOGLOBIN g/dL 8.4* 7.1*   PLATELETS 10*3/mm3 277 263            "   MEDICATIONS    atorvastatin, 20 mg, Oral, Nightly  budesonide-formoterol, 2 puff, Inhalation, BID - RT  bumetanide, 2 mg, Oral, Once per day on Sun Tue Thu Sat  clopidogrel, 75 mg, Oral, Daily  [START ON 3/16/2022] epoetin hubert/hubert-epbx, 10,000 Units, Intravenous, Once per day on Mon Wed Fri  ferrous sulfate, 324 mg, Oral, Daily With Breakfast  gabapentin, 800 mg, Oral, TID  insulin aspart, 0-24 Units, Subcutaneous, TID AC  insulin aspart, 12 Units, Subcutaneous, TID With Meals  insulin detemir, 15 Units, Subcutaneous, Q12H  ipratropium-albuterol, 3 mL, Nebulization, 4x Daily - RT  isosorbide mononitrate, 30 mg, Oral, QAM  levothyroxine, 25 mcg, Oral, Daily  lidocaine, 1 patch, Transdermal, Q24H  lisinopril, 5 mg, Oral, Daily  metoprolol tartrate, 25 mg, Oral, Q12H  montelukast, 10 mg, Oral, Nightly  nystatin, , Topical, TID  oxybutynin XL, 5 mg, Oral, Daily  pantoprazole, 40 mg, Oral, Nightly  potassium chloride, 10 mEq, Oral, Daily  ranolazine, 500 mg, Oral, Q12H  sennosides-docusate, 2 tablet, Oral, BID  sevelamer, 800 mg, Oral, TID With Meals  sodium bicarbonate, 1,300 mg, Oral, Daily  sodium chloride, 10 mL, Intravenous, Q12H      O2, 3 L/min, Last Rate: 3 L/min (03/15/22 1111)      Medications Prior to Admission   Medication Sig Dispense Refill Last Dose   • acetaminophen (Tylenol 8 Hour) 650 MG 8 hr tablet Take 1 tablet by mouth Every 8 (Eight) Hours As Needed for Mild Pain . 90 tablet 0 Past Month at Unknown time   • albuterol (PROVENTIL) (2.5 MG/3ML) 0.083% nebulizer solution Inhale the contents of 1 vial by nebulization Every 4 (Four) Hours As Needed for Wheezing. 75 mL 3 Past Week at Unknown time   • albuterol sulfate  (90 Base) MCG/ACT inhaler Inhale 2 puffs Every 4 (Four) Hours As Needed for Wheezing. 18 g 1 Past Week at Unknown time   • aspirin (aspirin) 81 MG EC tablet Take 1 tablet by mouth Daily. 60 tablet 5 3/14/2022 at 0900   • atorvastatin (LIPITOR) 20 MG tablet Take 1 tablet by mouth  every night at bedtime. 90 tablet 1 3/14/2022 at 2100   • bumetanide (BUMEX) 2 MG tablet Take 1 tablet by mouth 4 (Four) Times a Week. (Patient taking differently: Take 2 mg by mouth 4 (Four) Times a Week. Pt states she takes medication on Sunday, Monday, Wednesday and Friday.) 16 tablet 0 3/14/2022 at 2200   • Capsaicin 0.035 % cream Apply 3 g topically 3 (Three) Times a Day As Needed (ankle pain). 42.5 g 2 3/14/2022 at 0900   • clopidogrel (PLAVIX) 75 MG tablet Take 1 tablet by mouth Daily. 30 tablet 5 3/14/2022 at 0900   • Diclofenac Sodium (VOLTAREN) 1 % gel gel Apply 4 g topically to the appropriate area as directed 4 (Four) Times a Day As Needed (Ankle pain). 350 g 2 3/14/2022 at 0900   • DULoxetine (CYMBALTA) 20 MG capsule Take 1 capsule by mouth Daily for 30 days. 90 capsule 1 3/14/2022 at 0900   • epoetin hubert-epbx (RETACRIT) 13361 UNIT/ML injection Inject 1 mL under the skin into the appropriate area as directed 3 (Three) Times a Week. Indications: ESRD on Dialysis 6.6 mL 0 Past Week at Unknown time   • ferrous sulfate (FeroSul) 325 (65 FE) MG tablet Take 1 tablet by mouth Daily With Breakfast. 30 tablet 3 3/14/2022 at 0900   • insulin aspart (novoLOG FLEXPEN) 100 UNIT/ML solution pen-injector sc pen Inject 30 units in addition to sliding scale on sheet of paper (0-24 Units) under the skin as directed 3 (three) times daily with meals. 45 mL 12 3/14/2022 at 0900   • insulin detemir (LEVEMIR) 100 UNIT/ML injection Inject 30 Units under the skin into the appropriate area as directed Every morning and 35 units every night. 15 mL 12 3/14/2022 at Unknown time   • ipratropium-albuterol (DUO-NEB) 0.5-2.5 mg/3 ml nebulizer Take 3 mL by nebulization 4 (Four) Times a Day.   3/14/2022 at Unknown time   • isosorbide mononitrate (IMDUR) 30 MG 24 hr tablet Take 1 tablet by mouth Every Morning. 90 tablet 1 3/14/2022 at 0900   • levothyroxine (SYNTHROID, LEVOTHROID) 25 MCG tablet Take 25 mcg by mouth Daily.   3/14/2022 at  "0600   • metoprolol tartrate (LOPRESSOR) 25 MG tablet Take 1 tablet by mouth Every 12 (Twelve) Hours. 60 tablet 3 3/14/2022 at 2200   • Mirabegron ER (MYRBETRIQ) 25 MG tablet sustained-release 24 hour 24 hr tablet Take 25 mg by mouth Daily.   3/14/2022 at 0900   • montelukast (SINGULAIR) 10 MG tablet Take 1 tablet by mouth Every Night. 30 tablet 5 3/14/2022 at 2200   • O2 (OXYGEN) Inhale 3 L/min Continuous.   3/14/2022 at Unknown time   • oxybutynin XL (DITROPAN-XL) 5 MG 24 hr tablet Take 1 tablet by mouth Daily. 90 tablet 1 3/14/2022 at 0900   • pantoprazole (PROTONIX) 40 MG EC tablet Take 1 tablet by mouth Every Night. 30 tablet 5 3/14/2022 at 2200   • ranolazine (RANEXA) 500 MG 12 hr tablet Take 1 tablet by mouth Every 12 (Twelve) Hours. 60 tablet 0 3/14/2022 at 2200   • sennosides-docusate (PERICOLACE) 8.6-50 MG per tablet Take 2 tablets by mouth 2 (Two) Times a Day. 60 tablet 2 3/14/2022 at 2200   • sevelamer (RENVELA) 800 MG tablet Take 800 mg by mouth 3 (Three) Times a Day With Meals.   3/14/2022 at 0900   • sodium bicarbonate 650 MG tablet Take 2 tablets by mouth Daily. 60 tablet 0 3/14/2022 at 0900   • Blood Glucose Monitoring Suppl ([employer] Blood Glucose Meter) w/Device kit 1 each 3 (Three) Times a Day. 1 kit 3 Unknown at Unknown time   • Continuous Blood Gluc  (FreeStyle Jade 2 [place]) device 1 each Continuous. 1 each 0 Unknown at Unknown time   • Continuous Blood Gluc Sensor (FreeStyle Jade 2 Sensor) misc 1 each Every 14 (Fourteen) Days. 2 each 11 Unknown at Unknown time   • cyclobenzaprine (FLEXERIL) 5 MG tablet Take 10 mg by mouth.   Unknown at Unknown time   • Easy Touch Insulin Syringe 30G X 5/16\" 0.5 ML misc USE AS DIRECTED WITH LEVEMIR   Unknown at Unknown time   • EASY TOUCH PEN NEEDLES 31G X 8 MM misc    Unknown at Unknown time   • glucose blood test strip Use as instructed 100 each 12 Unknown at Unknown time   • insulin aspart (novoLOG FLEXPEN) 100 UNIT/ML solution pen-injector sc pen " Inject 30 Units under the skin into the appropriate area as directed 3 (Three) Times a Day With Meals. 30 mL 12    • Insulin Pen Needle (NovoFine) 30G X 8 MM misc As directed 4 times daily 100 each 11 Unknown at Unknown time   • Insulin Pen Needle 31G X 8 MM misc Use to inject insulin 4 (Four) Times a Day as directed. 120 each 12 Unknown at Unknown time   • lidocaine (LIDODERM) 5 % APPLY TO THE AFFECTED AREA(S) TOPICALLY TWO TIMES A DAY. REMOVE NIGHTLY 30 patch 1 Unknown at Unknown time   • lisinopril (PRINIVIL,ZESTRIL) 5 MG tablet Take 1 tablet by mouth Daily. 90 tablet 1    • muscle rub (BenGay) 10-15 % cream cream Apply 1 application topically to the appropriate area as directed 4 (Four) Times a Day As Needed for buttock pain. 85 g 1 Unknown at Unknown time   • nitroglycerin (NITROSTAT) 0.4 MG SL tablet Place 0.4 mg under the tongue Every 5 (Five) Minutes As Needed for Chest Pain (x 3 doses).      • nystatin (MYCOSTATIN) 926204 UNIT/GM powder Apply  topically to the appropriate area as directed 3 (Three) Times a Day. 15 g 1 Unknown at Unknown time   • ondansetron ODT (Zofran ODT) 4 MG disintegrating tablet Place 1 tablet on the tongue Every 8 (Eight) Hours As Needed for Nausea or Vomiting. 30 tablet 0 Unknown at Unknown time   • potassium chloride (MICRO-K) 10 MEQ CR capsule Take 10 mEq by mouth Daily.   Unknown at Unknown time     Assessment/Plan   ASSESSMENT / PLAN      Acute on chronic diastolic congestive heart failure ([facility])    Chronic obstructive pulmonary disease ([facility])    Morbid obesity with BMI of 50.0-59.9, adult ([facility])    GERD (gastroesophageal reflux disease)    Hypertension    Anemia due to chronic kidney disease, on chronic dialysis ([facility])    Type 2 diabetes mellitus with kidney complication, with long-term current use of insulin ([facility])    Physical deconditioning    ESRD on hemodialysis ([facility])    1.end-stage renal disease on hemodialysis Monday Wednesday Friday via tunneled dialysis catheter.  She was not  able to do her treatment yesterday because of being ill.  We will arrange for hemodialysis today and plan to do 1 unit of packed RBC during dialysis.  Her breathing is much more comfortable.  We will do 3 hours of treatment today and then again tomorrow to be back on a schedule.    2.chest pain the troponin has been negative.  She has right-sided pleural effusion.  Her last echocardiogram showed EF of 65%.  She has a heart cath back in November 2021.    3.anemia chronic kidney disease possible blood loss - patient has received 1 unit of packed RBC yesterday and plan to receive another unit today    4.diabetes mellitus type 2    Thank you for the consult.  We will continue to follow the patient during their hospital stay.       I discussed the patients findings and my recommendations with patient, nursing staff and consulting provider             This document has been electronically signed by Ace Lauren MD on March 15, 2022 16:52 CDT

## 2022-03-15 NOTE — OUTREACH NOTE
Medical Week 3 Survey    Flowsheet Row Responses   [facility] patient discharged from? [place]   Does the patient have one of the following disease processes/diagnoses(primary or secondary)? Other   Week 3 attempt successful? No   Unsuccessful attempts Attempt 1   Revoke Readmitted          STEPHANIE VALENTIN - Registered Nurse   DEPRESSION/SUICIDAL

## 2022-03-15 NOTE — THERAPY EVALUATION
Patient Name: Yamileth Slater  : 1960    MRN: [id number]                              Today's Date: 3/15/2022       Admit Date: 3/14/2022    Visit Dx:     ICD-10-CM ICD-9-CM   1. Impaired functional mobility, balance, gait, and endurance  Z74.09 V49.89     Patient Active Problem List   Diagnosis   • Chronic obstructive pulmonary disease ([facility])   • Chronic midline low back pain without sciatica   • Vitamin D deficiency   • Tobacco dependence syndrome   • Surgical follow-up care   • Shoulder joint pain   • Peripheral vascular disease ([facility])   • Pain   • Neurologic disorder associated with diabetes mellitus ([facility])   • Morbid obesity with BMI of 50.0-59.9, adult ([facility])   • Mixed hyperlipidemia   • Kidney stone   • History of colon polyps   • GERD (gastroesophageal reflux disease)   • Excoriated eczema   • Hypertension   • Encounter for medication refill   • COPD (chronic obstructive pulmonary disease) ([facility])   • Chronic folliculitis   • Coronary artery disease involving native coronary artery of native heart without angina pectoris   • Mild persistent asthma   • 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   • Hypothyroidism   • Carotid artery disease ([facility])   • Current smoker   • [place] abuse   • PAD (peripheral artery disease) ([facility])   • Intercostal pain   • Venous insufficiency   • LAFB (left anterior fascicular block)   • Pulmonary hypertension ([facility])   • Left hip pain   • Neck pain   • MIKE and COPD overlap syndrome ([facility])   • Pneumonia due to COVID-19 virus   • Hyperkalemia   • Cutaneous candidiasis   • Community acquired pneumonia of right middle lobe of lung   • MRSA infection   • Alkaline phosphatase elevation   • COVID-19 ruled out by laboratory testing   • Esophageal dysphagia   • Monilial esophagitis ([facility])   • NSTEMI, initial episode of care ([facility])   • Pneumonia due to infectious organism   • Cellulitis of left leg   • Acute on chronic diastolic congestive heart failure ([facility])   • Hyponatremia    • Urinary tract infection due to Proteus   • History of insertion of tunneled central venous catheter (CVC) with port   • Anemia due to chronic kidney disease, on chronic dialysis ([facility])   • Pneumonitis   • Personal history of noncompliance with medical treatment, presenting hazards to health   • Type 2 diabetes mellitus with kidney complication, with long-term current use of insulin ([facility])   • Physical deconditioning   • ESRD on hemodialysis ([facility])     Past Medical History:   Diagnosis Date   • Acute blood loss anemia 4/16/2017    Likely due to gastric oozing at this time. - Dr. Duarte (GI) was consulted and has now signed off, will follow up outpatient - pill colonoscopy showed AVMs - continue to monitor   • Acute cystitis with hematuria 3/31/2021    1/13: IV Rocephin 1 gm q 24 1/14 : transitioned  to omnicef 300mg. Urine cultures resulted and did not show growth. Omnicef discontinued as patient is asymptomatic   • Altered mental status 1/9/2022    - AMS on presentation - initial ABG pH 7.3, CO2 34 - Procal 0.29 - UA negative for acute cysitits -CTA head wnl  - Empiric Zosyn and Vancomycin -Lactate 2.5 on admission  - blood cultures no growth at 24 hours     • Anxiety    • CAD (coronary artery disease) 4/24/2021    S/P 3 stents 5/1/2021 for [facility] Continue ASA 81mg & Clopidogrel 75mg Continue Atorvastatin 40mg   • Carotid artery stenosis    • Chronic obstructive lung disease (HCC)    • CKD (chronic kidney disease) stage 4, GFR 15-29 ml/min ([facility])    • CKD (chronic kidney disease), symptom management only, stage 5 ([facility]) 10/5/2020    Results from last 7 days Lab Units 12/15/21 0548 12/14/21 1323 12/14/21 0916 CREATININE mg/dL 3.92* 3.21* 3.32*  Baseline creatinine 2-3 GFR 13-25 GFR 15 Dialysis MWF, sees Dr. Lauren Nephrology consult,, appreciate recommendations Continue Bumex 1mg bid daily Holding Bumex 2mg 4 times a week   • Colonic polyp    • Coronary arteriosclerosis    • Diabetes mellitus (HCC)    • Diabetic  neuropathy ([facility])    • Ear pain, right 10/18/2021    - canal trauma due to patient scratching and DMT2 - added cortisporin ear drops   • Elevated troponin 10/12/2021    -most likely from CKD -Trending down -Neg chest pain   • GERD (gastroesophageal reflux disease)    • GI bleed 5/13/2021    - GI will follow up outpatient - Protonix 40mg daily - Avoid medical DVT prophy and use mechanical at this time instead. - Continue to monitor - pill colonoscopy results showed AVMs   • History of transfusion    • Hypercholesterolemia    • Hypertension    • Hypomagnesemia 6/27/2021    Monitor and replace   • Morbid obesity ([facility])    • Nephrolithiasis    • Peripheral vascular disease ([facility])    • SIRS (systemic inflammatory response syndrome) ([facility]) 1/9/2022    Admission  - WBC 17.78   -   - RR 16 - 1/10: VSS/wnl - CXR - Mild pulm edema - Blood cultures no growth at 24 hours  - Procalcitonin 0.29 - UA : glucose 1000, negative Leucocytes/nitrate - Empiric Zosyn and Vancomcyin    • Sleep apnea    • Substance abuse ([facility])    • Vitamin D deficiency      Past Surgical History:   Procedure Laterality Date   • CARDIAC CATHETERIZATION N/A 7/14/2020   • CARDIAC CATHETERIZATION N/A 4/23/2021    Procedure: Left Heart Cath;  Surgeon: Melba Romo MD;  Location: [facility] CATH INVASIVE LOCATION;  Service: Cardiology;  Laterality: N/A;   • CARDIAC CATHETERIZATION N/A 4/30/2021    Procedure: Percutaneous Coronary Intervention;  Surgeon: Russell Voss MD;  Location: [facility] CATH INVASIVE LOCATION;  Service: Cardiovascular;  Laterality: N/A;   • CARDIAC CATHETERIZATION N/A 4/30/2021    Procedure: Stent NIKKI coronary;  Surgeon: Russell Voss MD;  Location: [facility] CATH INVASIVE LOCATION;  Service: Cardiovascular;  Laterality: N/A;   • CARDIAC CATHETERIZATION Left 11/13/2021    Procedure: Left Heart Cath;  Surgeon: Niall Rios MD;  Location: [facility] CATH INVASIVE LOCATION;  Service: Cardiology;  Laterality: Left;   • CAROTID  STENT Left    • COLONOSCOPY     • COLONOSCOPY N/A 5/14/2021    Procedure: COLONOSCOPY;  Surgeon: Mingo Duarte MD;  Location: [facility] ENDOSCOPY;  Service: Gastroenterology;  Laterality: N/A;   • CORONARY ARTERY BYPASS GRAFT N/A 2013    CABG X 3   • CYSTOSCOPY BLADDER STONE LITHOTRIPSY Bilateral    • ENDOSCOPY N/A 4/12/2021    Procedure: ESOPHAGOGASTRODUODENOSCOPY;  Surgeon: Mingo Duarte MD;  Location: [facility] ENDOSCOPY;  Service: Gastroenterology;  Laterality: N/A;   • ENDOSCOPY N/A 5/14/2021    Procedure: ESOPHAGOGASTRODUODENOSCOPY;  Surgeon: Mingo Duarte MD;  Location: [facility] ENDOSCOPY;  Service: Gastroenterology;  Laterality: N/A;   • ENDOSCOPY N/A 1/28/2022    Procedure: ESOPHAGOGASTRODUODENOSCOPY;  Surgeon: Mingo Duarte MD;  Location: [facility] ENDOSCOPY;  Service: Gastroenterology;  Laterality: N/A;   • INTERVENTIONAL RADIOLOGY PROCEDURE N/A 10/21/2021    Procedure: tunneled central venous catheter placement;  Surgeon: Donnie Robles MD;  Location: [facility] ANGIO INVASIVE LOCATION;  Service: Interventional Radiology;  Laterality: N/A;   • INTERVENTIONAL RADIOLOGY PROCEDURE N/A 1/27/2022    Procedure: tunneled central venous catheter placement;  Surgeon: Donnie Robles MD;  Location: [facility] ANGIO INVASIVE LOCATION;  Service: Interventional Radiology;  Laterality: N/A;      General Information     Row Name 03/15/22 0820          Physical Therapy Time and Intention    Document Type evaluation  -CZ     Mode of Treatment physical therapy;occupational therapy;co-treatment  -CZ     Row Name 03/15/22 0820          General Information    Patient Profile Reviewed yes  -CZ     Prior Level of Function independent:;all household mobility  -CZ     Existing Precautions/Restrictions fall;oxygen therapy device and L/min  -CZ     Barriers to Rehab medically complex  -CZ     Row Name 03/15/22 0820          Living Environment    People in Home spouse;sibling(s)  -CZ     Row Name 03/15/22 0820           Home Main Entrance    Number of Stairs, Main Entrance none  -CZ     Row Name 03/15/22 0820          Stairs Within Home, Primary    Stairs, Within Home, Primary Ambulates with FWW, tub/shower with seat, assist with lower body dressing.  -CZ     Number of Stairs, Within Home, Primary none  -CZ     Row Name 03/15/22 0820          Cognition    Orientation Status (Cognition) oriented x 4  -CZ     Row Name 03/15/22 0820          Safety Issues, Functional Mobility    Impairments Affecting Function (Mobility) strength;endurance/activity tolerance;balance;pain  -CZ           User Key  (r) = Recorded By, (t) = Taken By, (c) = Cosigned By    Initials Name Provider Type    CZ Christopher Arnett, PT Physical Therapist               Mobility     Row Name 03/15/22 0820          Bed Mobility    Bed Mobility supine-sit;sit-supine  -CZ     Supine-Sit [place] (Bed Mobility) supervision  -CZ     Sit-Supine [place] (Bed Mobility) supervision  -CZ     Assistive Device (Bed Mobility) head of bed elevated;bed rails  -CZ     Row Name 03/15/22 0820          Sit-Stand Transfer    Sit-Stand [place] (Transfers) contact guard  -CZ     Assistive Device (Sit-Stand Transfers) walker, front-wheeled  -CZ     Row Name 03/15/22 0820          Gait/Stairs (Locomotion)    [place] Level (Gait) contact guard  -CZ     Assistive Device (Gait) walker, front-wheeled  -CZ     Distance in Feet (Gait) 5'x1 forward/back  -CZ     Comment, (Gait/Stairs) Wide stance, good use of walker, no LOB.  -CZ           User Key  (r) = Recorded By, (t) = Taken By, (c) = Cosigned By    Initials Name Provider Type    Christopher Alvarenga, PT Physical Therapist               Obj/Interventions     Row Name 03/15/22 0820          Range of Motion Comprehensive    General Range of Motion bilateral lower extremity ROM WFL  -CZ     Row Name 03/15/22 0820          Strength Comprehensive (MMT)    Comment, General Manual Muscle Testing (MMT) Assessment BLEs: 3/5  grossly.  -CZ     Row Name 03/15/22 0820          Sensory Assessment (Somatosensory)    Sensory Assessment (Somatosensory) other (see comments)  Decreased sensation B feet.  -CZ           User Key  (r) = Recorded By, (t) = Taken By, (c) = Cosigned By    Initials Name Provider Type    Christopher Alvarenga, PT Physical Therapist               Goals/Plan     Row Name 03/15/22 0820          Bed Mobility Goal 1 (PT)    Activity/Assistive Device (Bed Mobility Goal 1, PT) sit to supine/supine to sit  -CZ     [place] Level/Cues Needed (Bed Mobility Goal 1, PT) independent  -CZ     Time Frame (Bed Mobility Goal 1, PT) by discharge  -CZ     Strategies/Barriers (Bed Mobility Goal 1, PT) HOB flat, no bed rails.  -CZ     Progress/Outcomes (Bed Mobility Goal 1, PT) goal not met  -CZ     Row Name 03/15/22 0820          Transfer Goal 1 (PT)    Activity/Assistive Device (Transfer Goal 1, PT) sit-to-stand/stand-to-sit;bed-to-chair/chair-to-bed;walker, rolling  -CZ     [place] Level/Cues Needed (Transfer Goal 1, PT) modified independence  -CZ     Time Frame (Transfer Goal 1, PT) by discharge  -CZ     Strategies/Barriers (Transfers Goal 1, PT) Hx of falls.  -CZ     Progress/Outcome (Transfer Goal 1, PT) goal not met  -CZ     Row Name 03/15/22 0820          Gait Training Goal 1 (PT)    Activity/Assistive Device (Gait Training Goal 1, PT) walker, rolling  -CZ     [place] Level (Gait Training Goal 1, PT) modified independence  -CZ     Distance (Gait Training Goal 1, PT) 50'x2.  -CZ     Strategies/Barriers (Gait Training Goal 1, PT) Maintain SpO2 >90%.  -CZ     Progress/Outcome (Gait Training Goal 1, PT) goal not met  -CZ           User Key  (r) = Recorded By, (t) = Taken By, (c) = Cosigned By    Initials Name Provider Type    Christopher Alvarenga, PT Physical Therapist               Clinical Impression     Row Name 03/15/22 0820          Pain    Pretreatment Pain Rating 9/10  -CZ     Posttreatment Pain Rating 9/10  -CZ      Pre/Posttreatment Pain Comment Headache d/t toothache.  -CZ     Pain Intervention(s) Medication (See MAR);Ambulation/increased activity;Repositioned;Distraction  -CZ     Row Name 03/15/22 0820          Plan of Care Review    Plan of Care Reviewed With patient  -CZ     Outcome Evaluation Initial PT evaluation complete, co-evaluation with OT.  Patient is alert, cooperative, speech is slightly slurred.  She requires SPV with bed mobility, CGA with transfers and gait, ambulating 5'x1 with FWW, forward/back, wide stance, good use of walker, no LOB. Patient has own 4WW, was active with HHPT prior to admission, would benefit from continuing HHPT upon discharge home.  Goals established, continue skilled I/P PT.  -     Row Name 03/15/22 0820          Therapy Assessment/Plan (PT)    Rehab Potential (PT) good, to achieve stated therapy goals  -     Criteria for Skilled Interventions Met (PT) yes;skilled treatment is necessary  -     Row Name 03/15/22 0820          Vital Signs    Pre Systolic BP Rehab 192  -CZ     Pre Treatment Diastolic BP 79  -CZ     Post Systolic BP Rehab 185  -CZ     Post Treatment Diastolic BP 79  -CZ     Pretreatment Heart Rate (beats/min) 62  -CZ     Posttreatment Heart Rate (beats/min) 61  -CZ     Pre SpO2 (%) 98  -CZ     O2 Delivery Pre Treatment nasal cannula  3 LPM, home O2 at 3 LPM  -CZ     Post SpO2 (%) 97  -CZ     O2 Delivery Post Treatment nasal cannula  -CZ     Pre Patient Position Supine  -CZ     Post Patient Position Supine  -CZ     Row Name 03/15/22 0820          Positioning and Restraints    Pre-Treatment Position in bed  -CZ     Post Treatment Position bed  -CZ     In Bed supine;call light within reach;encouraged to call for assist;exit alarm on  -CZ           User Key  (r) = Recorded By, (t) = Taken By, (c) = Cosigned By    Initials Name Provider Type    CZ Christopher Arnett, PT Physical Therapist               Outcome Measures     Row Name 03/15/22 0836 03/15/22 0700       How much  help from another person do you currently need...    Turning from your back to your side while in flat bed without using bedrails? 3  -CZ 3  -JY    Moving from lying on back to sitting on the side of a flat bed without bedrails? 3  -CZ 3  -JY    Moving to and from a bed to a chair (including a wheelchair)? 3  -CZ 3  -JY    Standing up from a chair using your arms (e.g., wheelchair, bedside chair)? 3  -CZ 3  -JY    Climbing 3-5 steps with a railing? 2  -CZ 2  -JY    To walk in hospital room? 3  -CZ 3  -JY    AM-PAC 6 Clicks Score (PT) 17  -CZ 17  -JY    Row Name 03/15/22 0836 03/15/22 0821       Functional Assessment    Outcome Measure Options AM-PAC 6 Clicks Basic Mobility (PT)  - AM-PAC 6 Clicks Daily Activity (OT)  -ME          User Key  (r) = Recorded By, (t) = Taken By, (c) = Cosigned By    Initials Name Provider Type    Dalila Cali, RN Registered Nurse    Christopher Alvarenga, PT Physical Therapist    Nikole Crawford, OTR/L Occupational Therapist                             Physical Therapy Education                 Title: PT OT SLP Therapies (In Progress)     Topic: Physical Therapy (In Progress)     Point: Mobility training (Done)     Learning Progress Summary           Patient Acceptance, E, VU by  at 3/15/2022 0851    Comment: PT POC, transfer technique, use of walker.                   Point: Home exercise program (Not Started)     Learner Progress:  Not documented in this visit.          Point: Body mechanics (Not Started)     Learner Progress:  Not documented in this visit.          Point: Precautions (Not Started)     Learner Progress:  Not documented in this visit.                      User Key     Initials Effective Dates Name Provider Type Discipline     06/16/21 -  Christopher Arnett, PT Physical Therapist PT              PT Recommendation and Plan  Planned Therapy Interventions (PT): balance training, bed mobility training, patient/family education, transfer training, gait training,  strengthening, stretching  Plan of Care Reviewed With: patient  Outcome Evaluation: Initial PT evaluation complete, co-evaluation with OT.  Patient is alert, cooperative, speech is slightly slurred.  She requires SPV with bed mobility, CGA with transfers and gait, ambulating 5'x1 with FWW, forward/back, wide stance, good use of walker, no LOB. Patient has own 4WW, was active with HHPT prior to admission, would benefit from continuing HHPT upon discharge home.  Goals established, continue skilled I/P PT.     Time Calculation:    PT Charges     Row Name 03/15/22 1202             Time Calculation    Start Time 0820  -CZ      Stop Time 0858  -CZ      Time Calculation (min) 38 min  -CZ      PT Received On 03/15/22  -CZ      PT Goal Re-Cert Due Date 03/28/22  -CZ              Untimed Charges    PT Eval/Re-eval Minutes 38  -CZ              Total Minutes    Untimed Charges Total Minutes 38  -CZ       Total Minutes 38  -CZ            User Key  (r) = Recorded By, (t) = Taken By, (c) = Cosigned By    Initials Name Provider Type    CZ Christopher Arnett, PT Physical Therapist              Therapy Charges for Today     Code Description Service Date Service Provider Modifiers Qty    [id number]  PT EVAL MOD COMPLEXITY 3 3/15/2022 Christopher Arnett, PT GP 1          PT G-Codes  Outcome Measure Options: AM-PAC 6 Clicks Basic Mobility (PT)  AM-PAC 6 Clicks Score (PT): 17  AM-PAC 6 Clicks Score (OT): 17    Christopher Arnett PT  3/15/2022

## 2022-03-15 NOTE — PLAN OF CARE
Goal Outcome Evaluation:  Plan of Care Reviewed With: patient           Outcome Evaluation: Initial PT evaluation complete, co-evaluation with OT.  Patient is alert, cooperative, speech is slightly slurred.  She requires SPV with bed mobility, CGA with transfers and gait, ambulating 5'x1 with FWW, forward/back, wide stance, good use of walker, no LOB. Patient has own 4WW, was active with HHPT prior to admission, would benefit from continuing HHPT upon discharge home.  Goals established, continue skilled I/P PT.

## 2022-03-15 NOTE — SIGNIFICANT NOTE
Blood Transfusion Consent    I spoke with the patient about the risks and benefits of blood transfusion, as outlined below:    Benefits:   Blood transfusion is a life-saving treatment that benefits patients by treating or preventing blood loss, which can lead to a seriously low hemoglobin level and cause damage to body organs due to a lack of oxygen.      Risks:   Patient acknowledged understanding that the use of blood or blood products has the following general risks:      Uncommon (1-5%) chance)   • Mild reactions resulting in itching, rash, fever, headaches.      Rare (<1% chance)   • Respiratory distress (shortness of breath) or lung injury   • Exposure to blood borne micro-organisms (bacteria and parasites) that could result in an infection   • Possible effects on the immune system, which may decrease the body’s ability to fight infection   • Exposure to blood borne viruses such as hepatitis B (an inflammatory disease affecting the liver)   • Shock      Extremely rare (one in a million or less)   • Exposure to blood borne viruses such as hepatitis C (an inflammatory disease affecting the liver) and Human Immunodeficiency Virus (HIV, the virus that causes AIDS)   • Death     I answered all the patient's/family's questions, inquires, and concerns. Patient gave consent to receive blood products if it becomes medically necessary. Patient did not have any questions at the time I left the room.       Jerman Coffman MD   PGY-[address]    [place] [address]  Office: [phone]    This document has been electronically signed by Jerman Coffman MD on March 14, 2022 19:57 CDT

## 2022-03-15 NOTE — PROGRESS NOTES
FAMILY MEDICINE RESIDENCY SERVICE  DAILY PROGRESS NOTE    NAME: Yamileth Slater  : 1960  MRN: [id number]      LOS: 0 days     PROVIDER OF SERVICE: Perez Hooker MD    Chief Complaint: Acute on chronic diastolic congestive heart failure (HCC)    Subjective:     Interval History:  History taken from: patient chart  Patient Complaints: Chest pain has resolved. Continues to have shortness of breath. Patient missed her HD yesterday.  Wt is up 10 pounds from last admission. No other new concerns.     Review of Systems:   Review of Systems   Constitutional: Negative for fever.   HENT: Positive for dental problem.    Respiratory: Positive for shortness of breath and wheezing.    Cardiovascular: Negative for chest pain and leg swelling.   Gastrointestinal: Negative for abdominal pain, blood in stool, constipation, diarrhea, nausea and vomiting.   Genitourinary: Negative for dysuria and hematuria.   Skin: Negative for rash.   Neurological: Negative for dizziness and light-headedness.       Objective:     Vital Signs  Temp:  [96.1 °F (35.6 °C)-97.7 °F (36.5 °C)] 96.4 °F (35.8 °C)  Heart Rate:  [57-66] 62  Resp:  [16-24] 20  BP: (121-197)/(56-91) 145/63  Flow (L/min):  [3-5] 3   Body mass index is 56.52 kg/m².    Physical Exam  Physical Exam  Vitals reviewed.   Constitutional:       General: She is not in acute distress.     Appearance: She is obese. She is ill-appearing.      Interventions: Nasal cannula in place.   HENT:      Head: Normocephalic and atraumatic.   Cardiovascular:      Rate and Rhythm: Normal rate and regular rhythm.      Pulses: Normal pulses.   Pulmonary:      Effort: Pulmonary effort is normal. No respiratory distress.      Breath sounds: Normal air entry. Transmitted upper airway sounds present. Wheezing present. No rhonchi or rales.      Comments: Transmitted upper airway wheeze on exhalation  Abdominal:      Palpations: Abdomen is soft.      Tenderness: There is no abdominal  tenderness.   Musculoskeletal:      Right lower leg: No edema.      Left lower leg: No edema.   Skin:     General: Skin is warm.   Neurological:      General: No focal deficit present.      Mental Status: She is alert.   Psychiatric:         Mood and Affect: Mood normal.         Behavior: Behavior normal.         Scheduled Meds   atorvastatin, 20 mg, Oral, Nightly  budesonide-formoterol, 2 puff, Inhalation, BID - RT  bumetanide, 2 mg, Oral, Once per day on Sun Tue Thu Sat  clopidogrel, 75 mg, Oral, Daily  ferrous sulfate, 324 mg, Oral, Daily With Breakfast  gabapentin, 800 mg, Oral, TID  insulin aspart, 0-24 Units, Subcutaneous, TID AC  insulin aspart, 12 Units, Subcutaneous, TID With Meals  insulin detemir, 15 Units, Subcutaneous, Q12H  ipratropium-albuterol, 3 mL, Nebulization, 4x Daily - RT  isosorbide mononitrate, 30 mg, Oral, QAM  levothyroxine, 25 mcg, Oral, Daily  lidocaine, 1 patch, Transdermal, Q24H  lisinopril, 5 mg, Oral, Daily  metoprolol tartrate, 25 mg, Oral, Q12H  montelukast, 10 mg, Oral, Nightly  nystatin, , Topical, TID  oxybutynin XL, 5 mg, Oral, Daily  pantoprazole, 40 mg, Oral, Nightly  potassium chloride, 10 mEq, Oral, Daily  ranolazine, 500 mg, Oral, Q12H  sennosides-docusate, 2 tablet, Oral, BID  sevelamer, 800 mg, Oral, TID With Meals  sodium bicarbonate, 1,300 mg, Oral, Daily  sodium chloride, 10 mL, Intravenous, Q12H       PRN Meds   •  acetaminophen  •  albuterol  •  calcium carbonate  •  Capsaicin  •  cyclobenzaprine  •  dextrose  •  dextrose  •  glucagon (human recombinant)  •  hydrALAZINE  •  melatonin  •  nitroglycerin  •  ondansetron **OR** ondansetron  •  sodium chloride  •  sodium chloride      Diagnostic Data    Results from last 7 days   Lab Units 03/15/22  0608   WBC 10*3/mm3 6.95   HEMOGLOBIN g/dL 8.4*   HEMATOCRIT % 26.9*   PLATELETS 10*3/mm3 277   GLUCOSE mg/dL 78   CREATININE mg/dL 3.79*   BUN mg/dL 58*   SODIUM mmol/L 131*   POTASSIUM mmol/L 4.0   AST (SGOT) U/L 11   ALT  (SGPT) U/L 8   ALK PHOS U/L 215*   BILIRUBIN mg/dL 0.5   ANION GAP mmol/L 11.0       XR Chest 1 View    Result Date: 3/14/2022  Cardiomegaly . Pulmonary vascular prominence and right-sided effusion both more pronounced than on prior examination. Unfavorable change. Electronically signed by:  Hugo Russo MD  3/14/2022 3:12 PM CDT Workstation: [id number]        I reviewed the patient's new clinical results.    Assessment/Plan:     Active Hospital Problems    Diagnosis  POA   • **Acute on chronic diastolic congestive heart failure (HCC) [I50.33]  Yes     pro-BNP > than 5900 on admission  - Echo 1/10/2022 : EF 61-65%  -  MWF Dialysis patient  - continue metoprolol, bumex  - Extra dose of bumex   Troponin negative x2  -Chest x-ray: Cardiomegaly, right-sided pleural effusion worse than previous exam  -EKG no ST segment changes  - Cath: 11/8/21 Dr. Rios  - Continue home medicine, Imdur and Ranexa       • ESRD on hemodialysis ([facility]) [N18.6, Z99.2]  Not Applicable     Cr 3.9, baseline around ~3  - MWF Dialysis patient   - Nephrology consulted, appreciate recs.   Hemodialysis planned today     • Physical deconditioning [R53.81]  Yes     - PT/OT   - fall precautions      • Type 2 diabetes mellitus with kidney complication, with long-term current use of insulin ([facility]) [E11.29, Z79.4]  Not Applicable     Glucose was 88 in ED  - Current orders: Levemir 15 tonight (30 in AM), aspart 12 prandial, high dose sliding scale  - Likely increase her regimen but concerns with hypoglycemia at current glucose  - Gabapentin   Past admission:  - Levemir 30units AM. 35 units PM  - Aspart 12 with meals, High dose SSI             • Anemia due to chronic kidney disease, on chronic dialysis ([facility]) [N18.6, D63.1, Z99.2]  Not Applicable     7.1 Hgb at admission  - received 1 unit of PRBCs    - down from 8 3 weeks ago  - Patient reports getting blood at OSH  - Discussed possibly getting more with dialysis planned for 3/15        • Chronic obstructive  pulmonary disease (HCC) [J44.9]  Yes     Symbicort  - Duo nebs and albuterol as needed  - Continue montelukast and cetirizine  - 3L at home, continue here. Increase to maintain sats 88-92%       • Hypertension [I10]  Yes     - Metoprolol 25mg bid  -Hydralzine PRN > 170 sys            • GERD (gastroesophageal reflux disease) [K21.9]  Yes     Protonix 40mg PO     • Morbid obesity with BMI of 50.0-59.9, adult (HCC) [E66.01, Z68.43]  Not Applicable     Body mass index is 56.52 kg/m².  - Consistent carb, cardiac, renal diet         DVT prophylaxis: SCDs/TEDs  Code status is   Code Status and Medical Interventions:   Ordered at: 03/14/22 1930     Level Of Support Discussed With:    Patient     Code Status (Patient has no pulse and is not breathing):    CPR (Attempt to Resuscitate)     Medical Interventions (Patient has pulse or is breathing):    Full Support     Comments:    Wants her son to be surrogate decison maker       Plan for disposition:Where: home and When:  2-3days      Time: 30 minutes      This document has been electronically signed by Perez Hooker MD on March 15, 2022 08:52 CDT

## 2022-03-15 NOTE — THERAPY EVALUATION
Patient Name: Yamileth Slater  : 1960    MRN: [id number]                              Today's Date: 3/15/2022       Admit Date: 3/14/2022    Visit Dx:     ICD-10-CM ICD-9-CM   1. Impaired functional mobility, balance, gait, and endurance  Z74.09 V49.89   2. Impaired mobility and ADLs  Z74.09 V49.89    Z78.9      Patient Active Problem List   Diagnosis   • Chronic obstructive pulmonary disease ([facility])   • Chronic midline low back pain without sciatica   • Vitamin D deficiency   • Tobacco dependence syndrome   • Surgical follow-up care   • Shoulder joint pain   • Peripheral vascular disease ([facility])   • Pain   • Neurologic disorder associated with diabetes mellitus ([facility])   • Morbid obesity with BMI of 50.0-59.9, adult ([facility])   • Mixed hyperlipidemia   • Kidney stone   • History of colon polyps   • GERD (gastroesophageal reflux disease)   • Excoriated eczema   • Hypertension   • Encounter for medication refill   • COPD (chronic obstructive pulmonary disease) ([facility])   • Chronic folliculitis   • Coronary artery disease involving native coronary artery of native heart without angina pectoris   • Mild persistent asthma   • 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   • Hypothyroidism   • Carotid artery disease ([facility])   • Current smoker   • Marihuana abuse   • PAD (peripheral artery disease) ([facility])   • Intercostal pain   • Venous insufficiency   • LAFB (left anterior fascicular block)   • Pulmonary hypertension ([facility])   • Left hip pain   • Neck pain   • MIKE and COPD overlap syndrome ([facility])   • Pneumonia due to COVID-19 virus   • Hyperkalemia   • Cutaneous candidiasis   • Community acquired pneumonia of right middle lobe of lung   • MRSA infection   • Alkaline phosphatase elevation   • COVID-19 ruled out by laboratory testing   • Esophageal dysphagia   • Monilial esophagitis ([facility])   • NSTEMI, initial episode of care ([facility])   • Pneumonia due to infectious organism   • Cellulitis of left leg   • Acute on chronic  diastolic congestive heart failure (HCC)   • Hyponatremia   • Urinary tract infection due to Proteus   • History of insertion of tunneled central venous catheter (CVC) with port   • Anemia due to chronic kidney disease, on chronic dialysis ([facility])   • Pneumonitis   • Personal history of noncompliance with medical treatment, presenting hazards to health   • Type 2 diabetes mellitus with kidney complication, with long-term current use of insulin ([facility])   • Physical deconditioning   • ESRD on hemodialysis ([facility])     Past Medical History:   Diagnosis Date   • Acute blood loss anemia 4/16/2017    Likely due to gastric oozing at this time. - Dr. Duarte (GI) was consulted and has now signed off, will follow up outpatient - pill colonoscopy showed AVMs - continue to monitor   • Acute cystitis with hematuria 3/31/2021    1/13: IV Rocephin 1 gm q 24 1/14 : transitioned  to omnicef 300mg. Urine cultures resulted and did not show growth. Omnicef discontinued as patient is asymptomatic   • Altered mental status 1/9/2022    - AMS on presentation - initial ABG pH 7.3, CO2 34 - Procal 0.29 - UA negative for acute cysitits -CTA head wnl  - Empiric Zosyn and Vancomycin -Lactate 2.5 on admission  - blood cultures no growth at 24 hours     • Anxiety    • CAD (coronary artery disease) 4/24/2021    S/P 3 stents 5/1/2021 for [facility] Continue ASA 81mg & Clopidogrel 75mg Continue Atorvastatin 40mg   • Carotid artery stenosis    • Chronic obstructive lung disease ([facility])    • CKD (chronic kidney disease) stage 4, GFR 15-29 ml/min ([facility])    • CKD (chronic kidney disease), symptom management only, stage 5 ([facility]) 10/5/2020    Results from last 7 days Lab Units 12/15/21 0548 12/14/21 1323 12/14/21 0916 CREATININE mg/dL 3.92* 3.21* 3.32*  Baseline creatinine 2-3 GFR 13-25 GFR 15 Dialysis MWF, sees Dr. Lauren Nephrology consult,, appreciate recommendations Continue Bumex 1mg bid daily Holding Bumex 2mg 4 times a week   • Colonic polyp    • Coronary  arteriosclerosis    • Diabetes mellitus (HCC)    • Diabetic neuropathy ([facility])    • Ear pain, right 10/18/2021    - canal trauma due to patient scratching and DMT2 - added cortisporin ear drops   • Elevated troponin 10/12/2021    -most likely from CKD -Trending down -Neg chest pain   • GERD (gastroesophageal reflux disease)    • GI bleed 5/13/2021    - GI will follow up outpatient - Protonix 40mg daily - Avoid medical DVT prophy and use mechanical at this time instead. - Continue to monitor - pill colonoscopy results showed AVMs   • History of transfusion    • Hypercholesterolemia    • Hypertension    • Hypomagnesemia 6/27/2021    Monitor and replace   • Morbid obesity ([facility])    • Nephrolithiasis    • Peripheral vascular disease ([facility])    • SIRS (systemic inflammatory response syndrome) ([facility]) 1/9/2022    Admission  - WBC 17.78   -   - RR 16 - 1/10: VSS/wnl - CXR - Mild pulm edema - Blood cultures no growth at 24 hours  - Procalcitonin 0.29 - UA : glucose 1000, negative Leucocytes/nitrate - Empiric Zosyn and Vancomcyin    • Sleep apnea    • Substance abuse ([facility])    • Vitamin D deficiency      Past Surgical History:   Procedure Laterality Date   • CARDIAC CATHETERIZATION N/A 7/14/2020   • CARDIAC CATHETERIZATION N/A 4/23/2021    Procedure: Left Heart Cath;  Surgeon: Melba Romo MD;  Location: [facility] CATH INVASIVE LOCATION;  Service: Cardiology;  Laterality: N/A;   • CARDIAC CATHETERIZATION N/A 4/30/2021    Procedure: Percutaneous Coronary Intervention;  Surgeon: Russell Voss MD;  Location: [facility] CATH INVASIVE LOCATION;  Service: Cardiovascular;  Laterality: N/A;   • CARDIAC CATHETERIZATION N/A 4/30/2021    Procedure: Stent NIKKI coronary;  Surgeon: Russell Voss MD;  Location: [facility] CATH INVASIVE LOCATION;  Service: Cardiovascular;  Laterality: N/A;   • CARDIAC CATHETERIZATION Left 11/13/2021    Procedure: Left Heart Cath;  Surgeon: Niall Rios MD;  Location: [facility] CATH INVASIVE  LOCATION;  Service: Cardiology;  Laterality: Left;   • CAROTID STENT Left    • COLONOSCOPY     • COLONOSCOPY N/A 5/14/2021    Procedure: COLONOSCOPY;  Surgeon: Mingo Duarte MD;  Location: [facility] ENDOSCOPY;  Service: Gastroenterology;  Laterality: N/A;   • CORONARY ARTERY BYPASS GRAFT N/A 2013    CABG X 3   • CYSTOSCOPY BLADDER STONE LITHOTRIPSY Bilateral    • ENDOSCOPY N/A 4/12/2021    Procedure: ESOPHAGOGASTRODUODENOSCOPY;  Surgeon: Mingo Duarte MD;  Location: [facility] ENDOSCOPY;  Service: Gastroenterology;  Laterality: N/A;   • ENDOSCOPY N/A 5/14/2021    Procedure: ESOPHAGOGASTRODUODENOSCOPY;  Surgeon: Mingo Duarte MD;  Location: [facility] ENDOSCOPY;  Service: Gastroenterology;  Laterality: N/A;   • ENDOSCOPY N/A 1/28/2022    Procedure: ESOPHAGOGASTRODUODENOSCOPY;  Surgeon: Mingo Duarte MD;  Location: [facility] ENDOSCOPY;  Service: Gastroenterology;  Laterality: N/A;   • INTERVENTIONAL RADIOLOGY PROCEDURE N/A 10/21/2021    Procedure: tunneled central venous catheter placement;  Surgeon: Donnie Robles MD;  Location: [facility] ANGIO INVASIVE LOCATION;  Service: Interventional Radiology;  Laterality: N/A;   • INTERVENTIONAL RADIOLOGY PROCEDURE N/A 1/27/2022    Procedure: tunneled central venous catheter placement;  Surgeon: Donnie Robles MD;  Location: [facility] INVASIVE LOCATION;  Service: Interventional Radiology;  Laterality: N/A;      General Information     Row Name 03/15/22 0821          OT Time and Intention    Document Type evaluation  -ME     Mode of Treatment co-treatment;occupational therapy;physical therapy  -ME     Row Name 03/15/22 0821          General Information    Patient Profile Reviewed yes  -ME     Prior Level of Function independent:;all household mobility;min assist:;mod assist:;ADL's  -ME     Existing Precautions/Restrictions fall;oxygen therapy device and L/min  -ME     Barriers to Rehab medically complex  -ME     Row Name 03/15/22 0821          Living  Environment    People in Home sibling(s);spouse  -ME     Row Name 03/15/22 0821          Home Main Entrance    Number of Stairs, Main Entrance none  -ME     Row Name 03/15/22 0821          Stairs Within Home, Primary    Stairs, Within Home, Primary ambulates with a FWW; tub shower with a shower chair; requires assistance with all LB bathing and dressing  -ME     Number of Stairs, Within Home, Primary none  -ME     Row Name 03/15/22 0821          Cognition    Orientation Status (Cognition) oriented x 4  -ME     Row Name 03/15/22 0821          Safety Issues, Functional Mobility    Safety Issues Affecting Function (Mobility) awareness of need for assistance;safety precaution awareness;safety precautions follow-through/compliance  -ME     Impairments Affecting Function (Mobility) balance;endurance/activity tolerance;strength;pain;shortness of breath  -ME           User Key  (r) = Recorded By, (t) = Taken By, (c) = Cosigned By    Initials Name Provider Type    ME Nikole Espino OTR/L Occupational Therapist                 Mobility/ADL's     Row Name 03/15/22 0821          Bed Mobility    Bed Mobility supine-sit;sit-supine  -ME     Supine-Sit [place] (Bed Mobility) supervision  -ME     Sit-Supine [place] (Bed Mobility) supervision  -ME     Assistive Device (Bed Mobility) bed rails;head of bed elevated  -ME     Row Name 03/15/22 0821          Transfers    Transfers sit-stand transfer  -ME     Sit-Stand [place] (Transfers) contact guard  -ME     Row Name 03/15/22 0821          Sit-Stand Transfer    Assistive Device (Sit-Stand Transfers) walker, front-wheeled  -ME     Row Name 03/15/22 0821          Functional Mobility    Functional Mobility- Ind. Level contact guard assist  -ME     Functional Mobility- Device rolling walker  -ME     Row Name 03/15/22 0821          Activities of Daily Living    BADL Assessment/Intervention lower body dressing  -ME     Row Name 03/15/22 0821          Lower Body Dressing  Assessment/Training    [place] Level (Lower Body Dressing) doff;don;socks;dependent (less than 25% patient effort)  -ME     Position (Lower Body Dressing) supine  -ME           User Key  (r) = Recorded By, (t) = Taken By, (c) = Cosigned By    Initials Name Provider Type    Nikole Crawford OTR/L Occupational Therapist               Obj/Interventions     [facility] Name 03/15/22 0821          Sensory Assessment (Somatosensory)    Sensory Assessment (Somatosensory) other (see comments)  -ME     Sensory Assessment complains of numbness/tingling in finger tips  -[facility] Name 03/15/22 0821          Range of Motion Comprehensive    General Range of Motion no range of motion deficits identified;bilateral upper extremity ROM WFL  -[facility] Name 03/15/22 0821          Strength Comprehensive (MMT)    General Manual Muscle Testing (MMT) Assessment other (see comments)  -ME     Comment, General Manual Muscle Testing (MMT) Assessment BUE strength and bilateral  strength are grossly 4/5; pt is left handed  -ME           User Key  (r) = Recorded By, (t) = Taken By, (c) = Cosigned By    Initials Name Provider Type    Nikole Crawford, OTR/L Occupational Therapist               Goals/Plan     Row Name 03/15/22 0821          Transfer Goal 1 (OT)    Activity/Assistive Device (Transfer Goal 1, OT) toilet  -ME     [place] Level/Cues Needed (Transfer Goal 1, OT) supervision required  -ME     Time Frame (Transfer Goal 1, OT) long term goal (LTG);by discharge  -ME     Progress/Outcome (Transfer Goal 1, OT) goal not met  -ME     Row Name 03/15/22 0821          Bathing Goal 1 (OT)    Activity/Device (Bathing Goal 1, OT) lower body bathing  -ME     [place] Level/Cues Needed (Bathing Goal 1, OT) minimum assist (75% or more patient effort)  -ME     Time Frame (Bathing Goal 1, OT) long term goal (LTG);by discharge  -ME     Progress/Outcomes (Bathing Goal 1, OT) goal not met  -ME     Row Name 03/15/22 0821          Dressing  Goal 1 (OT)    Activity/Device (Dressing Goal 1, OT) lower body dressing  AD as needed  -ME     [place]/Cues Needed (Dressing Goal 1, OT) minimum assist (75% or more patient effort)  -ME     Time Frame (Dressing Goal 1, OT) long term goal (LTG);by discharge  -ME     Progress/Outcome (Dressing Goal 1, OT) goal not met  -ME     Row Name 03/15/22 0821          Therapy Assessment/Plan (OT)    Planned Therapy Interventions (OT) activity tolerance training;adaptive equipment training;BADL retraining;functional balance retraining;IADL retraining;occupation/activity based interventions;patient/caregiver education/training;ROM/therapeutic exercise;strengthening exercise;transfer/mobility retraining  -ME           User Key  (r) = Recorded By, (t) = Taken By, (c) = Cosigned By    Initials Name Provider Type    ME Nikole Espino, OTR/L Occupational Therapist               Clinical Impression     [facility] Name 03/15/22 0821          Pain Assessment    Pretreatment Pain Rating 9/10  -ME     Posttreatment Pain Rating 9/10  -ME     Pre/Posttreatment Pain Comment headache due to toothache  -ME     Pain Intervention(s) Repositioned;Ambulation/increased activity;Distraction  -ME     Row Name 03/15/22 0821          Plan of Care Review    Plan of Care Reviewed With patient  -[facility] Name 03/15/22 0821          Therapy Assessment/Plan (OT)    Patient/Family Therapy Goal Statement (OT) to return home  -ME     Rehab Potential (OT) good, to achieve stated therapy goals  -ME     Criteria for Skilled Therapeutic Interventions Met (OT) yes;meets criteria;skilled treatment is necessary  -ME     Therapy Frequency (OT) other (see comments)  3-7 days per week  -ME     Predicted Duration of Therapy Intervention (OT) until d/c or all goals met  -ME     Row Name 03/15/22 0821          Therapy Plan Review/Discharge Plan (OT)    Anticipated Discharge Disposition (OT) home with assist;home with 24/7 care;home with home health  -ME     Row Name  03/15/22 0821          Vital Signs    Pre Systolic BP Rehab 192  -ME     Pre Treatment Diastolic BP 79  -ME     Post Systolic BP Rehab 185  -ME     Post Treatment Diastolic BP 79  -ME     Pretreatment Heart Rate (beats/min) 62  -ME     Posttreatment Heart Rate (beats/min) 61  -ME     Pre SpO2 (%) 98  -ME     O2 Delivery Pre Treatment nasal cannula  -ME     Intra SpO2 (%) 93  sitting eob to wash face  -ME     O2 Delivery Intra Treatment room air  -ME     Post SpO2 (%) 97  -ME     O2 Delivery Post Treatment nasal cannula  -ME     Pre Patient Position Supine  -ME     Intra Patient Position Sitting  -ME     Post Patient Position Supine  -ME     Row Name 03/15/22 0821          Positioning and Restraints    Pre-Treatment Position in bed  -ME     Post Treatment Position bed  -ME     In Bed notified nsg;fowlers;call light within reach;encouraged to call for assist;exit alarm on  -ME           User Key  (r) = Recorded By, (t) = Taken By, (c) = Cosigned By    Initials Name Provider Type    ME Nikole Espino, OTR/L Occupational Therapist               Outcome Measures     Row Name 03/15/22 0821          How much help from another is currently needed...    Putting on and taking off regular lower body clothing? 1  -ME     Bathing (including washing, rinsing, and drying) 2  -ME     Toileting (which includes using toilet bed pan or urinal) 2  -ME     Putting on and taking off regular upper body clothing 4  -ME     Taking care of personal grooming (such as brushing teeth) 4  -ME     Eating meals 4  -ME     AM-PAC 6 Clicks Score (OT) 17  -ME     Row Name 03/15/22 0836 03/15/22 0700       How much help from another person do you currently need...    Turning from your back to your side while in flat bed without using bedrails? 3  -CZ 3  -JY    Moving from lying on back to sitting on the side of a flat bed without bedrails? 3  -CZ 3  -JY    Moving to and from a bed to a chair (including a wheelchair)? 3  -CZ 3  -JY    Standing up from  a chair using your arms (e.g., wheelchair, bedside chair)? 3  -CZ 3  -JY    Climbing 3-5 steps with a railing? 2  -CZ 2  -JY    To walk in hospital room? 3  -CZ 3  -JY    AM-PAC 6 Clicks Score (PT) 17  -CZ 17  -JY    Row Name 03/15/22 0836 03/15/22 0821       Functional Assessment    Outcome Measure Options AM-PAC 6 Clicks Basic Mobility (PT)  - AM-PAC 6 Clicks Daily Activity (OT)  -ME          User Key  (r) = Recorded By, (t) = Taken By, (c) = Cosigned By    Initials Name Provider Type    Dalila Cali, RN Registered Nurse    Christopher Alvarenga, PT Physical Therapist    Nikole Crawford OTR/L Occupational Therapist                Occupational Therapy Education                 Title: PT OT SLP Therapies (In Progress)     Topic: Occupational Therapy (In Progress)     Point: ADL training (Not Started)     Description:   Instruct learner(s) on proper safety adaptation and remediation techniques during self care or transfers.   Instruct in proper use of assistive devices.              Learner Progress:  Not documented in this visit.          Point: Home exercise program (Not Started)     Description:   Instruct learner(s) on appropriate technique for monitoring, assisting and/or progressing therapeutic exercises/activities.              Learner Progress:  Not documented in this visit.          Point: Precautions (Done)     Description:   Instruct learner(s) on prescribed precautions during self-care and functional transfers.              Learning Progress Summary           Patient Acceptance, E, VU by ME at 3/15/2022 0846    Comment: Educated on OT and POC. Educated on safety precautions. Educated to call for assistance.                   Point: Body mechanics (Done)     Description:   Instruct learner(s) on proper positioning and spine alignment during self-care, functional mobility activities and/or exercises.              Learning Progress Summary           Patient Acceptance, E, VU by ME at 3/15/2022  0846    Comment: Educated on OT and POC. Educated on safety precautions. Educated to call for assistance.                               User Key     Initials Effective Dates Name Provider Type Discipline    ME 06/16/21 -  Nikole Espino OTR/L Occupational Therapist OT              OT Recommendation and Plan  Planned Therapy Interventions (OT): activity tolerance training, adaptive equipment training, BADL retraining, functional balance retraining, IADL retraining, occupation/activity based interventions, patient/caregiver education/training, ROM/therapeutic exercise, strengthening exercise, transfer/mobility retraining  Therapy Frequency (OT): other (see comments) (3-7 days per week)  Plan of Care Review  Plan of Care Reviewed With: patient  Outcome Evaluation: initial OT evaluation complete. co-eval with PT. pt was pleasant and cooperative throughout. BUE ROM WFL grossly. BUE strength and bilateral  strength are grossly 4/5. pt was supervision for bed mobility. CGA for transfers and functional mobility with use of RW. dependent for donning/doffing socks. recommend further skilled OT services to address functional mobility and ADL Deficits, as well as balance, strength, and endurance. recommend home with assistance and home health OT at discharge. goals established.     Time Calculation:    Time Calculation- OT     Row Name 03/15/22 1410             Time Calculation- OT    OT Start Time 0820  -ME      OT Stop Time 0858  -ME      OT Time Calculation (min) 38 min  -ME      OT Received On 03/15/22  -ME      OT Goal Re-Cert Due Date 03/28/22  -ME              Untimed Charges    OT Eval/Re-eval Minutes 38  -ME              Total Minutes    Untimed Charges Total Minutes 38  -ME       Total Minutes 38  -ME            User Key  (r) = Recorded By, (t) = Taken By, (c) = Cosigned By    Initials Name Provider Type    ME Nikole Espino OTR/L Occupational Therapist              Therapy Charges for Today     Code Description  Service Date Service Provider Modifiers Qty    33234406253 HC OT EVAL MOD COMPLEXITY 3 3/15/2022 Nikole Espino OTR/L GO 1               Nikole Espino OTR/AISHWARYA  3/15/2022

## 2022-03-15 NOTE — PLAN OF CARE
Problem: Adult Inpatient Plan of Care  Goal: Plan of Care Review  Outcome: Ongoing, Progressing  Goal: Patient-Specific Goal (Individualized)  Outcome: Ongoing, Progressing  Goal: Absence of Hospital-Acquired Illness or Injury  Outcome: Ongoing, Progressing  Intervention: Identify and Manage Fall Risk  Description: Perform standard risk assessment on admission using a validated tool or comprehensive approach appropriate to the patient; reassess fall risk frequently, with change in status or transfer to another level of care.  Communicate fall injury risk to interprofessional healthcare team.  Determine need for increased observation, equipment and environmental modification, such as low bed, signage and supportive, nonskid footwear.  Adjust safety measures to individual developmental age, stage and identified risk factors.  Reinforce the importance of safety and physical activity with patient and family.  Perform regular intentional rounding to assess need for position change, pain assessment and personal needs, including assistance with toileting.  Recent Flowsheet Documentation  Taken 3/15/2022 1329 by Sarah Hooker RN  Safety Promotion/Fall Prevention: safety round/check completed  Goal: Optimal Comfort and Wellbeing  Outcome: Ongoing, Progressing  Goal: Readiness for Transition of Care  Outcome: Ongoing, Progressing     Problem: Fall Injury Risk  Goal: Absence of Fall and Fall-Related Injury  Outcome: Ongoing, Progressing  Intervention: Promote Injury-Free Environment  Description: Provide a safe, barrier-free environment that encourages independent activity.  Keep care area uncluttered and well-lighted.  Determine need for increased observation or monitoring.  Avoid use of devices that minimize mobility, such as restraints or indwelling urinary catheter.  Recent Flowsheet Documentation  Taken 3/15/2022 1324 by Sarah Hooker RN  Safety Promotion/Fall Prevention: safety round/check completed     Problem: Skin  Injury Risk Increased  Goal: Skin Health and Integrity  Outcome: Ongoing, Progressing     Problem: Diabetes Comorbidity  Goal: Blood Glucose Level Within Targeted Range  Outcome: Ongoing, Progressing     Problem: Obstructive Sleep Apnea Risk or Actual Comorbidity Management  Goal: Unobstructed Breathing During Sleep  Outcome: Ongoing, Progressing   Goal Outcome Evaluation:

## 2022-03-15 NOTE — NURSING NOTE
Patient has scab that measures 0.2 x 0.4 cm to left lower extremity.  Suggest leaving [facility]SIE. POA.

## 2022-03-16 ENCOUNTER — APPOINTMENT (OUTPATIENT)
Dept: GENERAL RADIOLOGY | Facility: HOSPITAL | Age: 62
End: 2022-03-16

## 2022-03-16 PROBLEM — I50.33 ACUTE ON CHRONIC DIASTOLIC CONGESTIVE HEART FAILURE: Status: RESOLVED | Noted: 2021-10-11 | Resolved: 2022-03-16

## 2022-03-16 LAB
ALBUMIN SERPL-MCNC: 3.5 G/DL (ref 3.5–5.2)
ALBUMIN/GLOB SERPL: 0.8 G/DL
ALP SERPL-CCNC: 219 U/L (ref 39–117)
ALT SERPL W P-5'-P-CCNC: 6 U/L (ref 1–33)
ANION GAP SERPL CALCULATED.3IONS-SCNC: 11 MMOL/L (ref 5–15)
AST SERPL-CCNC: 8 U/L (ref 1–32)
BASOPHILS # BLD AUTO: 0.06 10*3/MM3 (ref 0–0.2)
BASOPHILS NFR BLD AUTO: 0.9 % (ref 0–1.5)
BH BB BLOOD EXPIRATION DATE: NORMAL
BH BB BLOOD TYPE BARCODE: NORMAL
BH BB DISPENSE STATUS: NORMAL
BH BB PRODUCT CODE: NORMAL
BH BB UNIT NUMBER: NORMAL
BILIRUB SERPL-MCNC: 0.5 MG/DL (ref 0–1.2)
BUN SERPL-MCNC: 38 MG/DL (ref 8–23)
BUN/CREAT SERPL: 12.6 (ref 7–25)
CALCIUM SPEC-SCNC: 9 MG/DL (ref 8.6–10.5)
CHLORIDE SERPL-SCNC: 96 MMOL/L (ref 98–107)
CO2 SERPL-SCNC: 26 MMOL/L (ref 22–29)
CREAT SERPL-MCNC: 3.02 MG/DL (ref 0.57–1)
CROSSMATCH INTERPRETATION: NORMAL
DEPRECATED RDW RBC AUTO: 50 FL (ref 37–54)
EGFRCR SERPLBLD CKD-EPI 2021: 16.8 ML/MIN/1.73
EOSINOPHIL # BLD AUTO: 0.19 10*3/MM3 (ref 0–0.4)
EOSINOPHIL NFR BLD AUTO: 3 % (ref 0.3–6.2)
ERYTHROCYTE [DISTWIDTH] IN BLOOD BY AUTOMATED COUNT: 15.6 % (ref 12.3–15.4)
GLOBULIN UR ELPH-MCNC: 4.2 GM/DL
GLUCOSE BLDC GLUCOMTR-MCNC: 149 MG/DL (ref 70–130)
GLUCOSE BLDC GLUCOMTR-MCNC: 169 MG/DL (ref 70–130)
GLUCOSE BLDC GLUCOMTR-MCNC: 240 MG/DL (ref 70–130)
GLUCOSE BLDC GLUCOMTR-MCNC: 282 MG/DL (ref 70–130)
GLUCOSE BLDC GLUCOMTR-MCNC: 308 MG/DL (ref 70–130)
GLUCOSE BLDC GLUCOMTR-MCNC: 388 MG/DL (ref 70–130)
GLUCOSE SERPL-MCNC: 400 MG/DL (ref 65–99)
HCT VFR BLD AUTO: 27.2 % (ref 34–46.6)
HGB BLD-MCNC: 8.7 G/DL (ref 12–15.9)
IMM GRANULOCYTES # BLD AUTO: 0.06 10*3/MM3 (ref 0–0.05)
IMM GRANULOCYTES NFR BLD AUTO: 0.9 % (ref 0–0.5)
LYMPHOCYTES # BLD AUTO: 1.26 10*3/MM3 (ref 0.7–3.1)
LYMPHOCYTES NFR BLD AUTO: 19.7 % (ref 19.6–45.3)
MCH RBC QN AUTO: 28.4 PG (ref 26.6–33)
MCHC RBC AUTO-ENTMCNC: 32 G/DL (ref 31.5–35.7)
MCV RBC AUTO: 88.9 FL (ref 79–97)
MONOCYTES # BLD AUTO: 0.5 10*3/MM3 (ref 0.1–0.9)
MONOCYTES NFR BLD AUTO: 7.8 % (ref 5–12)
NEUTROPHILS NFR BLD AUTO: 4.31 10*3/MM3 (ref 1.7–7)
NEUTROPHILS NFR BLD AUTO: 67.7 % (ref 42.7–76)
NRBC BLD AUTO-RTO: 0 /100 WBC (ref 0–0.2)
PLATELET # BLD AUTO: 253 10*3/MM3 (ref 140–450)
PMV BLD AUTO: 8.6 FL (ref 6–12)
POTASSIUM SERPL-SCNC: 4.2 MMOL/L (ref 3.5–5.2)
PROT SERPL-MCNC: 7.7 G/DL (ref 6–8.5)
RBC # BLD AUTO: 3.06 10*6/MM3 (ref 3.77–5.28)
SODIUM SERPL-SCNC: 133 MMOL/L (ref 136–145)
UNIT  ABO: NORMAL
UNIT  RH: NORMAL
WBC NRBC COR # BLD: 6.38 10*3/MM3 (ref 3.4–10.8)

## 2022-03-16 PROCEDURE — 71045 X-RAY EXAM CHEST 1 VIEW: CPT

## 2022-03-16 PROCEDURE — 99224 PR SBSQ OBSERVATION CARE/DAY 15 MINUTES: CPT | Performed by: STUDENT IN AN ORGANIZED HEALTH CARE EDUCATION/TRAINING PROGRAM

## 2022-03-16 PROCEDURE — 94799 UNLISTED PULMONARY SVC/PX: CPT

## 2022-03-16 PROCEDURE — 94760 N-INVAS EAR/PLS OXIMETRY 1: CPT

## 2022-03-16 PROCEDURE — 25010000002 EPOETIN ALFA-EPBX 10000 UNIT/ML SOLUTION: Performed by: INTERNAL MEDICINE

## 2022-03-16 PROCEDURE — 63710000001 INSULIN ASPART PER 5 UNITS: Performed by: STUDENT IN AN ORGANIZED HEALTH CARE EDUCATION/TRAINING PROGRAM

## 2022-03-16 PROCEDURE — 36415 COLL VENOUS BLD VENIPUNCTURE: CPT | Performed by: STUDENT IN AN ORGANIZED HEALTH CARE EDUCATION/TRAINING PROGRAM

## 2022-03-16 PROCEDURE — 25010000002 HEPARIN (PORCINE) PER 1000 UNITS: Performed by: INTERNAL MEDICINE

## 2022-03-16 PROCEDURE — G0257 UNSCHED DIALYSIS ESRD PT HOS: HCPCS

## 2022-03-16 PROCEDURE — 63710000001 INSULIN DETEMIR PER 5 UNITS: Performed by: STUDENT IN AN ORGANIZED HEALTH CARE EDUCATION/TRAINING PROGRAM

## 2022-03-16 PROCEDURE — G0378 HOSPITAL OBSERVATION PER HR: HCPCS

## 2022-03-16 PROCEDURE — 80053 COMPREHEN METABOLIC PANEL: CPT | Performed by: STUDENT IN AN ORGANIZED HEALTH CARE EDUCATION/TRAINING PROGRAM

## 2022-03-16 PROCEDURE — 82962 GLUCOSE BLOOD TEST: CPT

## 2022-03-16 PROCEDURE — 85025 COMPLETE CBC W/AUTO DIFF WBC: CPT | Performed by: STUDENT IN AN ORGANIZED HEALTH CARE EDUCATION/TRAINING PROGRAM

## 2022-03-16 RX ORDER — ALBUMIN (HUMAN) 12.5 G/50ML
12.5 SOLUTION INTRAVENOUS AS NEEDED
Status: ACTIVE | OUTPATIENT
Start: 2022-03-16 | End: 2022-03-16

## 2022-03-16 RX ORDER — HEPARIN SODIUM 1000 [USP'U]/ML
4000 INJECTION, SOLUTION INTRAVENOUS; SUBCUTANEOUS AS NEEDED
Status: DISCONTINUED | OUTPATIENT
Start: 2022-03-16 | End: 2022-03-17 | Stop reason: HOSPADM

## 2022-03-16 RX ADMIN — SEVELAMER CARBONATE 800 MG: 800 TABLET, FILM COATED ORAL at 09:42

## 2022-03-16 RX ADMIN — INSULIN DETEMIR 20 UNITS: 100 INJECTION, SOLUTION SUBCUTANEOUS at 09:45

## 2022-03-16 RX ADMIN — ISOSORBIDE MONONITRATE 30 MG: 30 TABLET, EXTENDED RELEASE ORAL at 06:24

## 2022-03-16 RX ADMIN — DOCUSATE SODIUM 50 MG AND SENNOSIDES 8.6 MG 2 TABLET: 8.6; 5 TABLET, FILM COATED ORAL at 09:43

## 2022-03-16 RX ADMIN — BUDESONIDE AND FORMOTEROL FUMARATE DIHYDRATE 2 PUFF: 160; 4.5 AEROSOL RESPIRATORY (INHALATION) at 06:58

## 2022-03-16 RX ADMIN — RANOLAZINE 500 MG: 500 TABLET, FILM COATED, EXTENDED RELEASE ORAL at 21:41

## 2022-03-16 RX ADMIN — SODIUM BICARBONATE 1300 MG: 650 TABLET ORAL at 09:43

## 2022-03-16 RX ADMIN — HEPARIN SODIUM 4000 UNITS: 1000 INJECTION, SOLUTION INTRAVENOUS; SUBCUTANEOUS at 11:29

## 2022-03-16 RX ADMIN — ACETAMINOPHEN 650 MG: 325 TABLET, FILM COATED ORAL at 23:21

## 2022-03-16 RX ADMIN — INSULIN ASPART 12 UNITS: 100 INJECTION, SOLUTION INTRAVENOUS; SUBCUTANEOUS at 17:38

## 2022-03-16 RX ADMIN — RANOLAZINE 500 MG: 500 TABLET, FILM COATED, EXTENDED RELEASE ORAL at 09:43

## 2022-03-16 RX ADMIN — IPRATROPIUM BROMIDE AND ALBUTEROL SULFATE 3 ML: 2.5; .5 SOLUTION RESPIRATORY (INHALATION) at 19:59

## 2022-03-16 RX ADMIN — METOPROLOL TARTRATE 25 MG: 25 TABLET, FILM COATED ORAL at 21:41

## 2022-03-16 RX ADMIN — INSULIN ASPART 12 UNITS: 100 INJECTION, SOLUTION INTRAVENOUS; SUBCUTANEOUS at 12:42

## 2022-03-16 RX ADMIN — CLOPIDOGREL BISULFATE 75 MG: 75 TABLET ORAL at 09:43

## 2022-03-16 RX ADMIN — INSULIN ASPART 12 UNITS: 100 INJECTION, SOLUTION INTRAVENOUS; SUBCUTANEOUS at 09:43

## 2022-03-16 RX ADMIN — LISINOPRIL 5 MG: 5 TABLET ORAL at 09:42

## 2022-03-16 RX ADMIN — LIDOCAINE 1 PATCH: 50 PATCH CUTANEOUS at 09:46

## 2022-03-16 RX ADMIN — OXYBUTYNIN CHLORIDE 5 MG: 5 TABLET, EXTENDED RELEASE ORAL at 09:43

## 2022-03-16 RX ADMIN — INSULIN DETEMIR 20 UNITS: 100 INJECTION, SOLUTION SUBCUTANEOUS at 21:45

## 2022-03-16 RX ADMIN — GABAPENTIN 800 MG: 400 CAPSULE ORAL at 21:41

## 2022-03-16 RX ADMIN — NYSTATIN: 100000 POWDER TOPICAL at 21:42

## 2022-03-16 RX ADMIN — INSULIN ASPART 4 UNITS: 100 INJECTION, SOLUTION INTRAVENOUS; SUBCUTANEOUS at 17:38

## 2022-03-16 RX ADMIN — LEVOTHYROXINE SODIUM 25 MCG: 25 TABLET ORAL at 09:42

## 2022-03-16 RX ADMIN — Medication 10 ML: at 21:41

## 2022-03-16 RX ADMIN — ATORVASTATIN CALCIUM 20 MG: 20 TABLET, FILM COATED ORAL at 21:41

## 2022-03-16 RX ADMIN — FERROUS SULFATE TAB EC 324 MG (65 MG FE EQUIVALENT) 324 MG: 324 (65 FE) TABLET DELAYED RESPONSE at 09:43

## 2022-03-16 RX ADMIN — METOPROLOL TARTRATE 25 MG: 25 TABLET, FILM COATED ORAL at 09:43

## 2022-03-16 RX ADMIN — SEVELAMER CARBONATE 800 MG: 800 TABLET, FILM COATED ORAL at 12:41

## 2022-03-16 RX ADMIN — EPOETIN ALFA-EPBX 10000 UNITS: 10000 INJECTION, SOLUTION INTRAVENOUS; SUBCUTANEOUS at 09:59

## 2022-03-16 RX ADMIN — DOCUSATE SODIUM 50 MG AND SENNOSIDES 8.6 MG 2 TABLET: 8.6; 5 TABLET, FILM COATED ORAL at 21:41

## 2022-03-16 RX ADMIN — PANTOPRAZOLE SODIUM 40 MG: 40 TABLET, DELAYED RELEASE ORAL at 21:41

## 2022-03-16 RX ADMIN — GABAPENTIN 800 MG: 400 CAPSULE ORAL at 17:38

## 2022-03-16 RX ADMIN — MONTELUKAST 10 MG: 10 TABLET, FILM COATED ORAL at 21:41

## 2022-03-16 RX ADMIN — SEVELAMER CARBONATE 800 MG: 800 TABLET, FILM COATED ORAL at 17:38

## 2022-03-16 RX ADMIN — BUDESONIDE AND FORMOTEROL FUMARATE DIHYDRATE 2 PUFF: 160; 4.5 AEROSOL RESPIRATORY (INHALATION) at 19:59

## 2022-03-16 RX ADMIN — Medication 10 ML: at 09:45

## 2022-03-16 RX ADMIN — IPRATROPIUM BROMIDE AND ALBUTEROL SULFATE 3 ML: 2.5; .5 SOLUTION RESPIRATORY (INHALATION) at 06:58

## 2022-03-16 RX ADMIN — INSULIN ASPART 20 UNITS: 100 INJECTION, SOLUTION INTRAVENOUS; SUBCUTANEOUS at 09:44

## 2022-03-16 RX ADMIN — IPRATROPIUM BROMIDE AND ALBUTEROL SULFATE 3 ML: 2.5; .5 SOLUTION RESPIRATORY (INHALATION) at 10:42

## 2022-03-16 RX ADMIN — GABAPENTIN 800 MG: 400 CAPSULE ORAL at 09:43

## 2022-03-16 NOTE — PLAN OF CARE
Goal Outcome Evaluation:  Plan of Care Reviewed With: patient        Progress: improving  Outcome Evaluation: VSS patient will receive dialysis again today to get her back on schedule.  3.5L taken Tuesday.  No CP and ease of breathing improved.

## 2022-03-16 NOTE — PROGRESS NOTES
FAMILY MEDICINE DAILY PROGRESS NOTE    NAME: Yamileth Slater  : 1960  MRN: [id number]      LOS: 0 days     PROVIDER OF SERVICE: Alvarado Mauricio MD    Chief Complaint: Acute on chronic diastolic congestive heart failure (HCC)    Subjective:     Interval History:  History taken from: patient     Afebrile. VS stable. Satting 92% on 3L nasal canula.     Ms. Slater has no acute complaints today. Received dialysis yesterday. Shortness of breath persists but has improved. Chest pain has improved.     White blood count remains normal at 6.38.  Hemoglobin stable at 8.7 following transfusion of packed red blood cells earlier in admission.  Creatinine has improved from 3.79-3.02 today after dialysis yesterday.  Sodium is improved from 1 31-1 33 today which corrects to normal at 136 for glucose.      Glucose elevated at 400, increased from 82 yesterday.  Patient only received 4 units of NovoLog and 15 units of Levemir yesterday.  Prior to admission, patient was on Levemir 30 units in the morning and 35 units in the evening with aspart 12 with meals and high-dose SSI.  Regimen was scaled back while inpatient due to hypoglycemia earlier in admission.    We will order incentive spirometry. Obtaining repeat x-ray this morning postdialysis. Will adjust insulin regimen today.     Per nephrology note, plan for dialysis again today      Review of Systems:   Review of Systems   Constitutional: Negative for chills and fever.   Respiratory: Positive for cough, shortness of breath and wheezing.    Cardiovascular: Negative for chest pain and leg swelling.   Gastrointestinal: Negative for abdominal pain, diarrhea and vomiting.   Genitourinary: Negative for difficulty urinating and dysuria.   Neurological: Negative for dizziness, weakness and light-headedness.       Objective:     Vital Signs  Temp:  [96.4 °F (35.8 °C)-97.5 °F (36.4 °C)] 97.3 °F (36.3 °C)  Heart Rate:  [56-74] 66  Resp:  [17-22] 17  BP: (125-173)/(33-79)  161/79  Body mass index is 56.52 kg/m².    Physical Exam  Physical Exam  Constitutional:       General: She is not in acute distress.     Appearance: She is obese. She is not ill-appearing, toxic-appearing or diaphoretic.   Cardiovascular:      Rate and Rhythm: Normal rate and regular rhythm.   Pulmonary:      Effort: Pulmonary effort is normal. No respiratory distress.      Breath sounds: Wheezing present. No rhonchi or rales.      Comments: Nasal canula in place.  Expiratory wheezes, greater on the right.   Abdominal:      Tenderness: There is no abdominal tenderness. There is no guarding.   Musculoskeletal:      Right lower leg: Edema (trace) present.      Left lower leg: Edema (trace) present.   Neurological:      Mental Status: She is alert.         Medication Review    Current Facility-Administered Medications:   •  acetaminophen (TYLENOL) tablet 650 mg, 650 mg, Oral, Q6H PRN, Perez Hooker MD, 650 mg at 03/15/22 0857  •  albuterol (PROVENTIL) nebulizer solution 0.083% 2.5 mg/3mL, 2.5 mg, Nebulization, Q4H PRN, Jerman Coffman MD  •  atorvastatin (LIPITOR) tablet 20 mg, 20 mg, Oral, Nightly, Jerman Coffman MD, 20 mg at 03/15/22 2107  •  budesonide-formoterol (SYMBICORT) 160-4.5 MCG/ACT inhaler 2 puff, 2 puff, Inhalation, BID - RT, Jerman Coffman MD, 2 puff at 03/15/22 1937  •  bumetanide (BUMEX) tablet 2 mg, 2 mg, Oral, Once per day on Sun Tue Thu Sat, Jerman Coffman MD, 2 mg at 03/15/22 0810  •  calcium carbonate (TUMS) chewable tablet 500 mg (200 mg elemental), 1 tablet, Oral, BID PRN, Jerman Coffman MD  •  Capsaicin 0.1 % cream 3 application, 3 g, Apply externally, TID PRN, Jerman Coffman MD  •  clopidogrel (PLAVIX) tablet 75 mg, 75 mg, Oral, Daily, Jerman Coffman MD, 75 mg at 03/15/22 0809  •  cyclobenzaprine (FLEXERIL) tablet 10 mg, 10 mg, Oral, TID PRN, Jerman Coffman MD  •  dextrose (D50W) (25 g/50 mL) IV injection 25 g, 25 g, Intravenous, Q15 Min PRN, Jerman Coffman MD  •  dextrose (GLUTOSE)  oral gel 15 g, 15 g, Oral, Q15 Min PRN, Jerman Coffman MD  •  epoetin hubert-epbx (RETACRIT) injection 10,000 Units, 10,000 Units, Intravenous, Once per day on Mon Wed Fri, Ace Lauren MD  •  ferrous sulfate EC tablet 324 mg, 324 mg, Oral, Daily With Breakfast, Jerman Coffman MD, 324 mg at 03/15/22 0810  •  gabapentin (NEURONTIN) capsule 800 mg, 800 mg, Oral, TID, Perez Hooker MD, 800 mg at 03/15/22 2107  •  glucagon (human recombinant) (GLUCAGEN DIAGNOSTIC) injection 1 mg, 1 mg, Intramuscular, Q15 Min PRN, Jerman Coffman MD  •  heparin (porcine) injection 4,000 Units, 4,000 Units, Intracatheter, PRN, Ace Lauren MD, 4,000 Units at 03/15/22 1926  •  hydrALAZINE (APRESOLINE) injection 10 mg, 10 mg, Intravenous, Q4H PRN, Jerman Coffman MD  •  insulin aspart (novoLOG) injection 0-24 Units, 0-24 Units, Subcutaneous, TID AC, Jerman Coffman MD, 4 Units at 03/15/22 1131  •  insulin aspart (novoLOG) injection 12 Units, 12 Units, Subcutaneous, TID With Meals, Jerman Coffman MD  •  insulin detemir (LEVEMIR) injection 15 Units, 15 Units, Subcutaneous, Q12H, Jerman Coffman MD, 15 Units at 03/15/22 2108  •  ipratropium-albuterol (DUO-NEB) nebulizer solution 3 mL, 3 mL, Nebulization, 4x Daily - RT, Jerman Coffman MD, 3 mL at 03/15/22 1937  •  isosorbide mononitrate (IMDUR) 24 hr tablet 30 mg, 30 mg, Oral, QAM, Jerman Coffman MD, 30 mg at 03/16/22 0624  •  levothyroxine (SYNTHROID, LEVOTHROID) tablet 25 mcg, 25 mcg, Oral, Daily, Jerman Coffman MD, 25 mcg at 03/15/22 0810  •  lidocaine (LIDODERM) 5 % 1 patch, 1 patch, Transdermal, Q24H, Jerman Coffman MD, 1 patch at 03/15/22 0811  •  lisinopril (PRINIVIL,ZESTRIL) tablet 5 mg, 5 mg, Oral, Daily, Jerman Coffman MD, 5 mg at 03/14/22 2229  •  melatonin tablet 6 mg, 6 mg, Oral, Nightly PRN, Jerman Coffman MD  •  metoprolol tartrate (LOPRESSOR) tablet 25 mg, 25 mg, Oral, Q12H, Jerman Coffman MD, 25 mg at 03/15/22 2107  •  montelukast (SINGULAIR) tablet 10 mg, 10 mg, Oral,  Nightly, Jerman Coffman MD, 10 mg at 03/15/22 2106  •  nitroglycerin (NITROSTAT) SL tablet 0.4 mg, 0.4 mg, Sublingual, Q5 Min PRN, Jerman Coffman MD  •  nystatin (MYCOSTATIN) powder, , Topical, TID, Jerman Coffman MD, Given at 03/15/22 1614  •  O2 (OXYGEN), 3 L/min, Inhalation, Continuous, Jerman Coffman MD, Last Rate: 180,000 mL/hr at 03/16/22 0539, 3 L/min at 03/16/22 0539  •  ondansetron (ZOFRAN) tablet 4 mg, 4 mg, Oral, Q6H PRN **OR** ondansetron (ZOFRAN) injection 4 mg, 4 mg, Intravenous, Q6H PRN, Jerman Coffman MD  •  oxybutynin XL (DITROPAN-XL) 24 hr tablet 5 mg, 5 mg, Oral, Daily, Jerman Coffman MD, 5 mg at 03/15/22 0810  •  pantoprazole (PROTONIX) EC tablet 40 mg, 40 mg, Oral, Nightly, Jerman Coffman MD, 40 mg at 03/15/22 2106  •  potassium chloride (MICRO-K) CR capsule 10 mEq, 10 mEq, Oral, Daily, Jerman Coffman MD  •  ranolazine (RANEXA) 12 hr tablet 500 mg, 500 mg, Oral, Q12H, Jerman Coffman MD, 500 mg at 03/15/22 2106  •  sennosides-docusate (PERICOLACE) 8.6-50 MG per tablet 2 tablet, 2 tablet, Oral, BID, Jerman Coffman MD, 2 tablet at 03/15/22 2106  •  sevelamer (RENVELA) tablet 800 mg, 800 mg, Oral, TID With Meals, Jerman Coffman MD, 800 mg at 03/15/22 1125  •  sodium bicarbonate tablet 1,300 mg, 1,300 mg, Oral, Daily, Jerman Coffman MD, 1,300 mg at 03/15/22 0810  •  sodium chloride 0.9 % flush 10 mL, 10 mL, Intravenous, PRN, Jerman Coffman MD  •  sodium chloride 0.9 % flush 10 mL, 10 mL, Intravenous, Q12H, Jerman Coffman MD, 10 mL at 03/15/22 2107  •  sodium chloride 0.9 % flush 10 mL, 10 mL, Intravenous, PRN, Jerman Coffman MD     Diagnostic Data    Lab Results (last 24 hours)     Procedure Component Value Units Date/Time    CBC Auto Differential [[id number]]  (Abnormal) Collected: 03/16/22 0557    Specimen: Blood Updated: 03/16/22 0609     WBC 6.38 10*3/mm3      RBC 3.06 10*6/mm3      Hemoglobin 8.7 g/dL      Hematocrit 27.2 %      MCV 88.9 fL      MCH 28.4 pg      MCHC 32.0 g/dL      RDW 15.6 %      " RDW-SD 50.0 fl      MPV 8.6 fL      Platelets 253 10*3/mm3      Neutrophil % 67.7 %      Lymphocyte % 19.7 %      Monocyte % 7.8 %      Eosinophil % 3.0 %      Basophil % 0.9 %      Immature Grans % 0.9 %      Neutrophils, Absolute 4.31 10*3/mm3      Lymphocytes, Absolute 1.26 10*3/mm3      Monocytes, Absolute 0.50 10*3/mm3      Eosinophils, Absolute 0.19 10*3/mm3      Basophils, Absolute 0.06 10*3/mm3      Immature Grans, Absolute 0.06 10*3/mm3      nRBC 0.0 /100 WBC     Comprehensive Metabolic Panel [[id number]] Collected: 03/16/22 0557    Specimen: Blood Updated: 03/16/22 0605    SCANNED - LABS [[id number]] Resulted: 03/14/22     Updated: 03/15/22 2235    Hepatitis B Surface Antigen [[id number]]  (Normal) Collected: 03/14/22 1752    Specimen: Blood Updated: 03/15/22 1515     Hepatitis B Surface Ag Non-Reactive    Procalcitonin [[id number]]  (Normal) Collected: 03/15/22 0608    Specimen: Blood Updated: 03/15/22 0930     Procalcitonin 0.15 ng/mL     Narrative:      As a Marker for Sepsis (Non-Neonates):    1. <0.5 ng/mL represents a low risk of severe sepsis and/or septic shock.  2. >2 ng/mL represents a high risk of severe sepsis and/or septic shock.    As a Marker for Lower Respiratory Tract Infections that require antibiotic therapy:    PCT on Admission    Antibiotic Therapy       6-12 Hrs later    >0.5                Strongly Recommended  >0.25 - <0.5        Recommended   0.1 - 0.25          Discouraged              Remeasure/reassess PCT  <0.1                Strongly Discouraged     Remeasure/reassess PCT    As 28 day mortality risk marker: \"Change in Procalcitonin Result\" (>80% or <=80%) if Day 0 (or Day 1) and Day 4 values are available. Refer to http://www.[employer]-pct-calculator.com    Change in PCT <=80%  A decrease of PCT levels below or equal to 80% defines a positive change in PCT test result representing a higher risk for 28-day all-cause mortality of patients diagnosed with severe sepsis for septic " shock.    Change in PCT >80%  A decrease of PCT levels of more than 80% defines a negative change in PCT result representing a lower risk for 28-day all-cause mortality of patients diagnosed with severe sepsis or septic shock.       Comprehensive Metabolic Panel [[id number]]  (Abnormal) Collected: 03/15/22 0608    Specimen: Blood Updated: 03/15/22 0703     Glucose 78 mg/dL      BUN 58 mg/dL      Creatinine 3.79 mg/dL      Sodium 131 mmol/L      Potassium 4.0 mmol/L      Chloride 95 mmol/L      CO2 25.0 mmol/L      Calcium 9.0 mg/dL      Total Protein 7.8 g/dL      Albumin 3.60 g/dL      ALT (SGPT) 8 U/L      AST (SGOT) 11 U/L      Alkaline Phosphatase 215 U/L      Total Bilirubin 0.5 mg/dL      Globulin 4.2 gm/dL      A/G Ratio 0.9 g/dL      BUN/Creatinine Ratio 15.3     Anion Gap 11.0 mmol/L      eGFR 12.8 mL/min/1.73      Comment: <15 Indicative of kidney failure       Narrative:      GFR Normal >60  Chronic Kidney Disease <60  Kidney Failure <15      CBC Auto Differential [[id number]]  (Abnormal) Collected: 03/15/22 0608    Specimen: Blood Updated: 03/15/22 0651     WBC 6.95 10*3/mm3      RBC 2.96 10*6/mm3      Hemoglobin 8.4 g/dL      Hematocrit 26.9 %      MCV 90.9 fL      MCH 28.4 pg      MCHC 31.2 g/dL      RDW 15.4 %      RDW-SD 50.4 fl      MPV 8.9 fL      Platelets 277 10*3/mm3      Neutrophil % 65.3 %      Lymphocyte % 21.7 %      Monocyte % 7.9 %      Eosinophil % 3.5 %      Basophil % 1.0 %      Immature Grans % 0.6 %      Neutrophils, Absolute 4.54 10*3/mm3      Lymphocytes, Absolute 1.51 10*3/mm3      Monocytes, Absolute 0.55 10*3/mm3      Eosinophils, Absolute 0.24 10*3/mm3      Basophils, Absolute 0.07 10*3/mm3      Immature Grans, Absolute 0.04 10*3/mm3      nRBC 0.0 /100 WBC     POC Glucose Once [[id number]]  (Normal) Collected: 03/15/22 0622    Specimen: Blood Updated: 03/15/22 0651     Glucose 82 mg/dL      Comment: : [id number] DARIEN Cruz ID: [id number]               I reviewed  the patient's new clinical results.    Assessment/Plan:     Active Hospital Problems    Diagnosis  POA   • **Acute on chronic diastolic congestive heart failure (HCC) [I50.33]  Yes     pro-BNP > than 5900 on admission  - Echo 1/10/2022 : EF 61-65%  -  MWF Dialysis patient  - continue metoprolol, bumex  - Extra dose of bumex   Troponin negative x2  -Chest x-ray: Cardiomegaly, right-sided pleural effusion worse than previous exam  -EKG no ST segment changes  - Cath: 11/8/21 Dr. Rios  - Continue home medicine, Imdur and Ranexa       • ESRD on hemodialysis ([facility]) [N18.6, Z99.2]  Not Applicable     Cr 3.9, baseline around ~3  - MWF Dialysis patient   - Nephrology consulted, appreciate recs.   Hemodialysis planned today     • Physical deconditioning [R53.81]  Yes     - PT/OT   - fall precautions      • Type 2 diabetes mellitus with kidney complication, with long-term current use of insulin ([facility]) [E11.29, Z79.4]  Not Applicable     Glucose was 88 in ED  - Current orders: Levemir 15 tonight (30 in AM), aspart 12 prandial, high dose sliding scale  - Likely increase her regimen but concerns with hypoglycemia at current glucose  - Gabapentin   Past admission:  - Levemir 30units AM. 35 units PM  - Aspart 12 with meals, High dose SSI             • Anemia due to chronic kidney disease, on chronic dialysis (HCC) [N18.6, D63.1, Z99.2]  Not Applicable     7.1 Hgb at admission  - received 1 unit of PRBCs    - down from 8 3 weeks ago  - Patient reports getting blood at OSH  - Discussed possibly getting more with dialysis planned for 3/15        • Chronic obstructive pulmonary disease (HCC) [J44.9]  Yes     Symbicort  - Duo nebs and albuterol as needed  - Continue montelukast and cetirizine  - 3L at home, continue here. Increase to maintain sats 88-92%       • Hypertension [I10]  Yes     - Metoprolol 25mg bid  -Hydralzine PRN > 170 sys            • GERD (gastroesophageal reflux disease) [K21.9]  Yes     Protonix 40mg PO     • Morbid  obesity with BMI of 50.0-59.9, adult ([facility]) [E66.01, Z68.43]  Not Applicable     Body mass index is 56.52 kg/m².  - Consistent carb, cardiac, renal diet         DVT prophylaxis: Heparin     Code status is   Code Status and Medical Interventions:   Ordered at: 03/14/22 1930     Level Of Support Discussed With:    Patient     Code Status (Patient has no pulse and is not breathing):    CPR (Attempt to Resuscitate)     Medical Interventions (Patient has pulse or is breathing):    Full Support     Comments:    Wants her son to be surrogate decison maker       Plan for disposition:To place of residence in 1-2 days       Time: 15 min         This document has been electronically signed by Alvarado Mauricio MD on March 16, 2022 06:25 CDT    Part of the note may be an electronic transcription or translation of spoken language to printed text using the Dragon Dictation System

## 2022-03-16 NOTE — PLAN OF CARE
Goal Outcome Evaluation:  Plan of Care Reviewed With: patient, caregiver           Outcome Evaluation: Pt eating 100% at meals. RD visit was to review Low Na diet recs d/t CHF dx, but pt indicates she is familiar w/this info and declines review. Will monitor POC.

## 2022-03-16 NOTE — DISCHARGE SUMMARY
DISCHARGE SUMMARY    PATIENT NAME: Yamileth Slater        PHYSICIAN: Perez Hooker MD  : 1960  MRN: [id number]    ADMITTED: 3/14/2022     DISCHARGED: 3/17/2022    ADMISSION DIAGNOSES:  Active Hospital Problems    Diagnosis  POA   • **ESRD on hemodialysis (HCC) [N18.6, Z99.2]  Not Applicable   • Chronic obstructive pulmonary disease (HCC) [J44.9]  Yes     Priority: High   • Type 2 diabetes mellitus with kidney complication, with long-term current use of insulin (HCC) [E11.29, Z79.4]  Not Applicable     Priority: Medium   • Physical deconditioning [R53.81]  Yes   • Anemia due to chronic kidney disease, on chronic dialysis (HCC) [N18.6, D63.1, Z99.2]  Not Applicable   • Hypertension [I10]  Yes   • GERD (gastroesophageal reflux disease) [K21.9]  Yes   • Morbid obesity with BMI of 50.0-59.9, adult (HCC) [E66.01, Z68.43]  Not Applicable      Resolved Hospital Problems    Diagnosis Date Resolved POA   • Chest pain [R07.9] 03/15/2022 Yes   • Acute on chronic diastolic congestive heart failure (HCC) [I50.33] 2022 Yes       DISCHARGE DIAGNOSES:   Active Hospital Problems    Diagnosis  POA   • **ESRD on hemodialysis (HCC) [N18.6, Z99.2]  Not Applicable   • Chronic obstructive pulmonary disease (HCC) [J44.9]  Yes     Priority: High   • Type 2 diabetes mellitus with kidney complication, with long-term current use of insulin (HCC) [E11.29, Z79.4]  Not Applicable     Priority: Medium   • Physical deconditioning [R53.81]  Yes   • Anemia due to chronic kidney disease, on chronic dialysis (HCC) [N18.6, D63.1, Z99.2]  Not Applicable   • Hypertension [I10]  Yes   • GERD (gastroesophageal reflux disease) [K21.9]  Yes   • Morbid obesity with BMI of 50.0-59.9, adult (HCC) [E66.01, Z68.43]  Not Applicable      Resolved Hospital Problems    Diagnosis Date Resolved POA   • Chest pain [R07.9] 03/15/2022 Yes   • Acute on chronic diastolic congestive heart failure (HCC) [I50.33] 2022 Yes       SERVICE:  Family Medicine Residency  Attending: Alex Wells MD  Resident: Perez Hooker MD    CONSULTS:   Consult Orders (all) (From admission, onward)     Start     Ordered    03/14/22 2157  Inpatient Case Management  Consult  Once        Provider:  (Not yet assigned)    03/14/22 2157 03/14/22 2008  Inpatient Nephrology Consult  Once        Specialty:  Nephrology  Provider:  Ace Lauren MD    03/14/22 2008 03/14/22 1849  Family Practice - Faculty (on-call MD unless specified)  Once        Specialty:  Family Medicine  Provider:  Jerman Coffman MD    03/14/22 1849                PROCEDURES:   Hemodialysis 3/15/2022 and 3/16/2022    HISTORY OF PRESENT ILLNESS:   Retrieved from History and Physical Exam by Dr. Jerman Coffman on 3/14/2022:    Yamileth Slater is a 62 y.o. female with a CMH of COPD/MIKE (on 3L O2 at home and trilogy machine), ESRD with dialysis M,W,F, anemia of CD, CAD s/p CABG, DMII requiring insulin, debility, HTN, HLD, GERD who presents complaining of shortness of breath and chest pain.  Shortness of breath and chest pain started approximately 2 days ago.  Chest pain is described as dull to sharp pain throughout most of her chest.  She is unable to localize it single spot.  There is no radiation or associated symptoms.  She has not noticed changes with exertion.  She has had a CABG in the past as well as carotid stenting done.  Patient reports continuing to take her cardiac medications.  Her last echo was January 2022, cardiac cath November 2021.     Patient has worsening shortness of breath over the last couple days.  She remains on 3 L oxygen at home.  Denies cough.  Has had some wheezing and congestion.  She has somewhat recently started smoking cigarettes again.  She was due to get dialysis today but came to the emergency department prior to her scheduled dialysis due to this shortness of breath.  She has noticed some leg swelling although this is somewhat chronic.   Equal bilaterally.  Patient reports still making good urine on nondialysis days.     In the emergency department she was found to be anemic down to a hemoglobin of 7.1.  Her creatinine was up to 3.9 with a GFR around 12.  Chest x-ray was obtained and showed pulmonary vascular prominence and right-sided effusion both more pronounced than on prior examination.  Patient was hypertensive with the greatest systolic being over 180 but trended down without any medical intervention.  Her vitals were otherwise stable.  Nephrology was consulted from emergency department.  Patient was admitted for anemia, ESRD and pulmonary effusion.      DIAGNOSTIC DATA:   Lab Results (last 24 hours)     Procedure Component Value Units Date/Time    POC Glucose Once [[id number]]  (Abnormal) Collected: 03/15/22 1022    Specimen: Blood Updated: 03/17/22 0740     Glucose 180 mg/dL      Comment: RN NotifiedOperator: [id number] JUAQUIN CONTRERASNIFERMeter ID: [id number]       POC Glucose Once [[id number]]  (Abnormal) Collected: 03/17/22 0600    Specimen: Blood Updated: 03/17/22 0635     Glucose 183 mg/dL      Comment: RN NotifiedOperator: [id number] CHRISTIE NOAHMeter ID: [id number]       Comprehensive Metabolic Panel [[id number]]  (Abnormal) Collected: 03/17/22 0545    Specimen: Blood Updated: 03/17/22 0625     Glucose 186 mg/dL      BUN 28 mg/dL      Creatinine 2.51 mg/dL      Sodium 130 mmol/L      Potassium 4.0 mmol/L      Chloride 94 mmol/L      CO2 27.0 mmol/L      Calcium 9.1 mg/dL      Total Protein 7.3 g/dL      Albumin 3.50 g/dL      ALT (SGPT) 6 U/L      AST (SGOT) 9 U/L      Alkaline Phosphatase 184 U/L      Total Bilirubin 0.4 mg/dL      Globulin 3.8 gm/dL      A/G Ratio 0.9 g/dL      BUN/Creatinine Ratio 11.2     Anion Gap 9.0 mmol/L      eGFR 21.0 mL/min/1.73      Comment: National Kidney Foundation and American Society of Nephrology (ASN) Task Force recommended calculation based on the Chronic Kidney Disease Epidemiology Collaboration  (CKD-EPI) equation refit without adjustment for race.       Narrative:      GFR Normal >60  Chronic Kidney Disease <60  Kidney Failure <15      CBC Auto Differential [[id number]]  (Abnormal) Collected: 03/17/22 0545    Specimen: Blood Updated: 03/17/22 0617     WBC 6.37 10*3/mm3      RBC 2.99 10*6/mm3      Hemoglobin 8.5 g/dL      Hematocrit 26.7 %      MCV 89.3 fL      MCH 28.4 pg      MCHC 31.8 g/dL      RDW 15.3 %      RDW-SD 50.0 fl      MPV 8.6 fL      Platelets 275 10*3/mm3      Neutrophil % 61.9 %      Lymphocyte % 25.9 %      Monocyte % 8.3 %      Eosinophil % 2.4 %      Basophil % 0.9 %      Immature Grans % 0.6 %      Neutrophils, Absolute 3.94 10*3/mm3      Lymphocytes, Absolute 1.65 10*3/mm3      Monocytes, Absolute 0.53 10*3/mm3      Eosinophils, Absolute 0.15 10*3/mm3      Basophils, Absolute 0.06 10*3/mm3      Immature Grans, Absolute 0.04 10*3/mm3      nRBC 0.0 /100 WBC     POC Glucose Once [[id number]]  (Abnormal) Collected: 03/16/22 1607    Specimen: Blood Updated: 03/16/22 2037     Glucose 169 mg/dL      Comment: RN NotifiedOperator: [id number] HADLEY TAYLORMeter ID: [id number]       POC Glucose Once [[id number]]  (Abnormal) Collected: 03/16/22 1930    Specimen: Blood Updated: 03/16/22 2028     Glucose 149 mg/dL      Comment: RN NotifiedOperator: [id number] CHRISTIE MACIELAHMeter ID: [id number]       POC Glucose Once [[id number]]  (Abnormal) Collected: 03/15/22 1931    Specimen: Blood Updated: 03/16/22 1449     Glucose 240 mg/dL      Comment: RN NotifiedOperator: [id number] DARINE COLEMANMeter ID: [id number]       POC Glucose Once [[id number]]  (Abnormal) Collected: 03/15/22 1610    Specimen: Blood Updated: 03/16/22 1445     Glucose 308 mg/dL      Comment: RN NotifiedOperator: [id number] CHA LAIRDMeter ID: [id number]           Imaging Results (Last 7 Days)     Procedure Component Value Units Date/Time    XR Chest 1 View [[id number]] Collected: 03/16/22 0628     Updated: 03/16/22 1007    Narrative:       EXAMINATION:  XR CHEST 1 VW    CLINICAL HISTORY:  62 years Female,Congestive Heart failure s/p  RRT, Z74.09 Other reduced mobility Z74.09 Other reduced mobility  Z78.9 Other specified health status    COMPARISON:  Chest x-ray dated 3/14/2022    FINDINGS:  Sternotomy. Cardiomegaly. Pulmonary vascularity is  congested, though the degree of congestion has mildly improved  since 3/14/2022. Small bilateral pleural effusions, right larger  than left, with adjacent atelectasis, also present previously. No  pneumothorax. Right chest wall dialysis catheter with tip in the  low SVC.      Impression:      Cardiomegaly with improving pulmonary vascular  congestion and persistent small pleural effusions.    Electronically signed by:  Jerman Corrales MD  3/16/2022 10:04 AM  CDT Workstation: [id number]    XR Chest 1 View [[id number]] Collected: 03/14/22 1445     Updated: 03/14/22 1514    Narrative:      Chest x-ray single view.         CLINICAL INDICATION: Shortness of breath. Chest pain    COMPARISON: Chest February 24, 2022.    FINDINGS: Cardiac silhouette is enlarged in size. Pulmonary  vascularity is increased. Midline sternal sutures from prior  cardiac surgery.    Large-caliber tunneled central venous catheter right jugular  approach with catheter tip in the superior  vena cava in  satisfactory position. Moderate size right-sided pleural  effusion.      Impression:      Cardiomegaly . Pulmonary vascular prominence and  right-sided effusion both more pronounced than on prior  examination. Unfavorable change.    Electronically signed by:  Hugo Russo MD  3/14/2022 3:12 PM CDT  Workstation: [id number]          HOSPITAL COURSE:  Acute on chronic diastolic congestive heart failure  Chest x-ray on admission showed pulmonary vascular prominence and a right-sided effusion likely contributing to patient's shortness of breath and chest pain. Patient underwent hemodialysis treatment on Tuesday 3-15-22 and Wednesday 3-16-22.  On day of  discharge patient's shortness of breath had resolved and she was stable on her home oxygen supplementation of 3 L nasal cannula.    ESRD on hemodialysis  Creatinine 3.9 on admission.  Patient received 2 rounds of hemodialysis and creatinine had improved to 2.5 on day of discharge.  Per nephrology recommendations, patient sodium bicarbonate discontinued.    Anemia due to chronic kidney disease on chronic dialysis  Hemoglobin on admission was 7.1 patient received 2 units of packed red blood cells in addition to hemodialysis treatments.  Patient's hemoglobin was stable at 8.5 on day of discharge.  Per nephrology recommendations, patient's iron supplementation and Epogen to be discontinued as an outpatient order because this will be addressed with her hemodialysis treatments.    Type 2 diabetes mellitus with kidney complication with long-term use of insulin  Patient was hypoglycemic with blood sugars down to 56 on night of admission.  Patient's Levemir was adjusted down to 20 units in the morning and nightly.  Patient's mealtime insulin kept at 12 units. Patient's NovoLog switched to Humalog at discharge per pharmacy recommendations so that patient's insurance would cover medication.    DISCHARGE CONDITION:   Stable     DISPOSITION:  Home or Self Care    DISCHARGE MEDICATIONS     Discharge Medications      Changes to Medications      Instructions Start Date   bumetanide 2 MG tablet  Commonly known as: BUMEX  What changed: additional instructions   2 mg, Oral, 4 Times Weekly, Take on non-hemodialysis days      insulin detemir 100 UNIT/ML injection  Commonly known as: LEVEMIR  What changed: how much to take   20 Units, Subcutaneous, Nightly      NovoLOG FlexPen 100 UNIT/ML solution pen-injector sc pen  Generic drug: insulin aspart  What changed: Another medication with the same name was changed. Make sure you understand how and when to take each.   Inject 30 units in addition to sliding scale on sheet of paper (0-24  "Units) under the skin as directed 3 (three) times daily with meals.      insulin aspart 100 UNIT/ML solution pen-injector sc pen  Commonly known as: novoLOG FLEXPEN  What changed: how much to take   12 Units, Subcutaneous, 3 Times Daily With Meals         Continue These Medications      Instructions Start Date   acetaminophen 650 MG 8 hr tablet  Commonly known as: Tylenol 8 Hour   650 mg, Oral, Every 8 Hours PRN      Adult Aspirin Regimen 81 MG EC tablet  Generic drug: aspirin   81 mg, Oral, Daily      albuterol sulfate  (90 Base) MCG/ACT inhaler  Commonly known as: PROVENTIL HFA;VENTOLIN HFA;PROAIR HFA   2 puffs, Inhalation, Every 4 Hours PRN      albuterol (2.5 MG/3ML) 0.083% nebulizer solution  Commonly known as: PROVENTIL   Inhale the contents of 1 vial by nebulization Every 4 (Four) Hours As Needed for Wheezing.      atorvastatin 20 MG tablet  Commonly known as: LIPITOR   20 mg, Oral, Every Night at Bedtime      Capsaicin 0.035 % cream   3 g, Apply externally, 3 Times Daily PRN      clopidogrel 75 MG tablet  Commonly known as: PLAVIX   75 mg, Oral, Daily      [employer] Blood Glucose Meter w/Device kit   1 each, Does not apply, 3 Times Daily      cyclobenzaprine 5 MG tablet  Commonly known as: FLEXERIL   10 mg, Oral      Diclofenac Sodium 1 % gel gel  Commonly known as: VOLTAREN   4 g, Topical, 4 Times Daily PRN      DULoxetine 20 MG capsule  Commonly known as: CYMBALTA   20 mg, Oral, Daily      Easy Touch Insulin Syringe 30G X 5/16\" 0.5 ML misc  Generic drug: Insulin Syringe-Needle U-100   USE AS DIRECTED WITH LEVEMIR      Easy Touch Pen Needles 31G X 8 MM misc  Generic drug: Insulin Pen Needle   No dose, route, or frequency recorded.      NovoFine 30G X 8 MM misc  Generic drug: Insulin Pen Needle   As directed 4 times daily      TRUEplus Pen Needles 31G X 8 MM misc  Generic drug: Insulin Pen Needle   Use to inject insulin 4 (Four) Times a Day as directed.      FreeStyle Jade 2 [place] device   1 each, Does " not apply, Continuous      FreeStyle Jade 2 Sensor misc   1 each, Does not apply, Every 14 Days      glucose blood test strip   Use as instructed      ipratropium-albuterol 0.5-2.5 mg/3 ml nebulizer  Commonly known as: DUO-NEB   3 mL, Nebulization, 4 Times Daily - RT      isosorbide mononitrate 30 MG 24 hr tablet  Commonly known as: IMDUR   30 mg, Oral, Every Morning      levothyroxine 25 MCG tablet  Commonly known as: SYNTHROID, LEVOTHROID   25 mcg, Oral, Daily      lidocaine 5 %  Commonly known as: LIDODERM   APPLY TO THE AFFECTED AREA(S) TOPICALLY TWO TIMES A DAY. REMOVE NIGHTLY      lisinopril 5 MG tablet  Commonly known as: PRINIVIL,ZESTRIL   5 mg, Oral, Daily      metoprolol tartrate 25 MG tablet  Commonly known as: LOPRESSOR   Take 1 tablet by mouth Every 12 (Twelve) Hours.      Mirabegron ER 25 MG tablet sustained-release 24 hour 24 hr tablet  Commonly known as: MYRBETRIQ   25 mg, Oral, Daily      montelukast 10 MG tablet  Commonly known as: SINGULAIR   10 mg, Oral, Nightly      muscle rub 10-15 % cream cream   Apply 1 application topically to the appropriate area as directed 4 (Four) Times a Day As Needed for buttock pain.      nitroglycerin 0.4 MG SL tablet  Commonly known as: NITROSTAT   0.4 mg, Sublingual, Every 5 Minutes PRN      nystatin 721987 UNIT/GM powder  Commonly known as: MYCOSTATIN   Topical, 3 Times Daily      O2  Commonly known as: OXYGEN   3 L/min, Inhalation, Continuous      ondansetron ODT 4 MG disintegrating tablet  Commonly known as: Zofran ODT   4 mg, Translingual, Every 8 Hours PRN      oxybutynin XL 5 MG 24 hr tablet  Commonly known as: DITROPAN-XL   5 mg, Oral, Daily      pantoprazole 40 MG EC tablet  Commonly known as: PROTONIX   40 mg, Oral, Nightly      ranolazine 500 MG 12 hr tablet  Commonly known as: RANEXA   500 mg, Oral, Every 12 Hours Scheduled      sennosides-docusate 8.6-50 MG per tablet  Commonly known as: PERICOLACE   2 tablets, Oral, 2 Times Daily      sevelamer 800 MG  tablet  Commonly known as: RENVELA   800 mg, Oral, 3 Times Daily With Meals         Stop These Medications    epoetin hubert-epbx 50106 UNIT/ML injection  Commonly known as: RETACRIT     ferrous sulfate 325 (65 FE) MG tablet  Commonly known as: FeroSul     potassium chloride 10 MEQ CR capsule  Commonly known as: MICRO-K     sodium bicarbonate 650 MG tablet            INSTRUCTIONS:  Activity:   Activity Instructions     Activity as Tolerated          Diet:   Diet Instructions     Diet: Consistent Carbohydrate      Discharge Diet: Consistent Carbohydrate    Fluid Restriction per day: 1500 mL Fluid    Sodium restriction          FOLLOW UP:   Additional Instructions for the Follow-ups that You Need to Schedule     Discharge Follow-up with PCP   As directed       Currently Documented PCP:    Rianna Macias MD    PCP Phone Number:    [phone]     Follow Up Details: Within 1 week for post hospital visit         Discharge Follow-up with Specified Provider: Dr. Lauren of Nephrology   As directed      To: Dr. Lauren of Nephrology            Follow-up Information     Rianna Macias MD. Schedule an appointment as soon as possible for a visit in 1 week(s).    Specialty: Family Medicine  Contact information:  [address]   [facility] [phone]             Ace Lauren MD. Schedule an appointment as soon as possible for a visit.    Specialty: Nephrology  Contact information:  [address] [place], [place] [address] [phone]                         PENDING TEST RESULTS AT DISCHARGE      Time: 35 minutes was spent in discharge planning, medication reconciliation and coordination of care for this patient.    Alex Wells MD is the attending at time of discharge, He is aware of the patient's status and agrees with the above discharge summary.      This document has been electronically signed by Perez Hooker MD on March 17, 2022 16:49 CDT

## 2022-03-16 NOTE — SIGNIFICANT NOTE
03/16/22 1335   OTHER   Discipline physical therapist   Rehab Time/Intention   Session Not Performed patient/family declined treatment   Recommendation   PT - Next Appointment 03/17/22   Patient deferred any PT at this time secondary to fatigue after dialysis. Will f/u as appropriate.

## 2022-03-16 NOTE — PROGRESS NOTES
"[facility]  [address] [place] [address]. [id number]  T - [phone]  F - [phone]     Progress Note          PATIENT  DEMOGRAPHICS   PATIENT NAME: Yamileth Slater                      PHYSICIAN: Ace Lauren MD  : 1960  MRN: [id number]   LOS: 0 days    Patient Care Team:  Rianna Macias MD as PCP - General (Family Medicine)  Subjective   SUBJECTIVE   Getting her hd now. No chest pain. Some soa         Objective   OBJECTIVE   Vital Signs  Temp:  [97.2 °F (36.2 °C)-97.5 °F (36.4 °C)] 97.5 °F (36.4 °C)  Heart Rate:  [56-74] 72  Resp:  [17-22] 18  BP: (125-173)/(33-79) 163/75    Flowsheet Rows    Flowsheet Row First Filed Value   Admission Height 157.5 cm (62\") Documented at 2022 1435   Admission Weight 140 kg (309 lb) Documented at 2022 1435           I/O last 3 completed shifts:  In: 1361.3 [P.O.:820; Blood:541.3]  Out: 6050 [Urine:2550; Other:3500]    PHYSICAL EXAM    Physical Exam  Vitals reviewed.   Constitutional:       Appearance: Normal appearance.   HENT:      Nose: Nose normal.   Cardiovascular:      Rate and Rhythm: Normal rate and regular rhythm.      Pulses: Normal pulses.      Heart sounds: Normal heart sounds. No murmur heard.  Pulmonary:      Effort: Pulmonary effort is normal.      Breath sounds: No wheezing or rales.   Abdominal:      General: Abdomen is flat. There is no distension.      Palpations: Abdomen is soft.      Tenderness: There is no abdominal tenderness.   Musculoskeletal:         General: No swelling.   Skin:     Coloration: Skin is not jaundiced.      Findings: No erythema.   Neurological:      General: No focal deficit present.      Mental Status: She is alert and oriented to person, place, and time.         RESULTS   Results Review:    Results from last 7 days   Lab Units 22  0557 03/15/22  0608 22  1752   SODIUM mmol/L 133* 131* 136   POTASSIUM mmol/L 4.2 4.0 4.0   CHLORIDE mmol/L 96* 95* 98   CO2 mmol/L 26.0 " 25.0 23.0   BUN mg/dL 38* 58* 57*   CREATININE mg/dL 3.02* 3.79* 3.90*   CALCIUM mg/dL 9.0 9.0 8.7   BILIRUBIN mg/dL 0.5 0.5 0.4   ALK PHOS U/L 219* 215* 230*   ALT (SGPT) U/L 6 8 9   AST (SGOT) U/L 8 11 11   GLUCOSE mg/dL 400* 78 88       Estimated Creatinine Clearance: 25.9 mL/min (A) (by C-G formula based on SCr of 3.02 mg/dL (H)).    Results from last 7 days   Lab Units 03/14/22  1914   MAGNESIUM mg/dL 1.9             Results from last 7 days   Lab Units 03/16/22  0557 03/15/22  0608 03/14/22  1752   WBC 10*3/mm3 6.38 6.95 7.60   HEMOGLOBIN g/dL 8.7* 8.4* 7.1*   PLATELETS 10*3/mm3 253 277 263               Imaging Results (Last 24 Hours)     Procedure Component Value Units Date/Time    XR Chest 1 View [[id number]] Collected: 03/16/22 0628     Updated: 03/16/22 1007    Narrative:      EXAMINATION:  XR CHEST 1 VW    CLINICAL HISTORY:  62 years Female,Congestive Heart failure s/p  RRT, Z74.09 Other reduced mobility Z74.09 Other reduced mobility  Z78.9 Other specified health status    COMPARISON:  Chest x-ray dated 3/14/2022    FINDINGS:  Sternotomy. Cardiomegaly. Pulmonary vascularity is  congested, though the degree of congestion has mildly improved  since 3/14/2022. Small bilateral pleural effusions, right larger  than left, with adjacent atelectasis, also present previously. No  pneumothorax. Right chest wall dialysis catheter with tip in the  low SVC.      Impression:      Cardiomegaly with improving pulmonary vascular  congestion and persistent small pleural effusions.    Electronically signed by:  Jerman Corrales MD  3/16/2022 10:04 AM  CDT Workstation: [id number]           MEDICATIONS    atorvastatin, 20 mg, Oral, Nightly  budesonide-formoterol, 2 puff, Inhalation, BID - RT  bumetanide, 2 mg, Oral, Once per day on Sun Tue Thu Sat  clopidogrel, 75 mg, Oral, Daily  epoetin hubert/hubert-epbx, 10,000 Units, Intravenous, Once per day on Mon Wed Fri  ferrous sulfate, 324 mg, Oral, Daily With Breakfast  gabapentin, 800  mg, Oral, TID  insulin aspart, 0-24 Units, Subcutaneous, TID AC  insulin aspart, 12 Units, Subcutaneous, TID With Meals  insulin detemir, 20 Units, Subcutaneous, Q12H  ipratropium-albuterol, 3 mL, Nebulization, 4x Daily - RT  isosorbide mononitrate, 30 mg, Oral, QAM  levothyroxine, 25 mcg, Oral, Daily  lidocaine, 1 patch, Transdermal, Q24H  lisinopril, 5 mg, Oral, Daily  metoprolol tartrate, 25 mg, Oral, Q12H  montelukast, 10 mg, Oral, Nightly  nystatin, , Topical, TID  oxybutynin XL, 5 mg, Oral, Daily  pantoprazole, 40 mg, Oral, Nightly  potassium chloride, 10 mEq, Oral, Daily  ranolazine, 500 mg, Oral, Q12H  sennosides-docusate, 2 tablet, Oral, BID  sevelamer, 800 mg, Oral, TID With Meals  sodium bicarbonate, 1,300 mg, Oral, Daily  sodium chloride, 10 mL, Intravenous, Q12H      O2, 3 L/min, Last Rate: 3 L/min (03/16/22 1022)        Assessment/Plan   ASSESSMENT / PLAN      Acute on chronic diastolic congestive heart failure (HCC)    Chronic obstructive pulmonary disease ([facility])    Morbid obesity with BMI of 50.0-59.9, adult ([facility])    GERD (gastroesophageal reflux disease)    Hypertension    Anemia due to chronic kidney disease, on chronic dialysis ([facility])    Type 2 diabetes mellitus with kidney complication, with long-term current use of insulin ([facility])    Physical deconditioning    ESRD on hemodialysis ([facility])    1.end-stage renal disease on hemodialysis Monday Wednesday Friday via tunneled dialysis catheter.  She was not able to do her treatment Monday because of being ill.  Hd Tuesday and Wednesday (today). Her breathing is much more comfortable. cxr better. Dc kcl    2.chest pain the troponin has been negative.  She has right-sided pleural effusion.  Her last echocardiogram showed EF of 65%.  She has a heart cath back in November 2021.    3.anemia chronic kidney disease possible blood loss - patient has received 2 unit of packed RBC so far     4.diabetes mellitus type 2                This document has been  electronically signed by Ace Lauren MD on March 16, 2022 11:00 CDT

## 2022-03-16 NOTE — CONSULTS
Adult Nutrition  Assessment    Patient Name:  Yamileth Slater  YOB: 1960  MRN: [id number]  Admit Date:  3/14/2022    Assessment Date:  3/16/2022    Comments:  RD consult r/t dx CHF. Hx includes DM/COPD/anemia. Pt getting HD when RD visited. Pt indicates she has received Low Na diet ed previously and denies need for review---dialysis RN in room at time of visit and confirms pt is familiar w/this info. Pt reports overall good appetite, no gi distress. Intakes have been 100% the past 5 meals. RD will monitor course and make recs prn.       Reason for Assessment     Row Name 03/16/22 1111          Reason for Assessment    Reason For Assessment per organizational policy     Diagnosis cardiac disease     Identified At Risk by Screening Criteria need for education                Nutrition/Diet History     Row Name 03/16/22 1111          Nutrition/Diet History    Typical Intake (Food/Fluid/EN/PN) Pt receiving HD at time of RD visit. Pt says she is familiar w/Low Na diet recs and denies need for review. Dialysis RN in room and confirms pt has received this info.                        Nutrition Prescription Ordered     Row Name 03/16/22 1112          Nutrition Prescription PO    Current PO Diet Regular     Fluid Consistency Thin     Common Modifiers Cardiac;Consistent Carbohydrate;Renal                Evaluation of Received Nutrient/Fluid Intake     Row Name 03/16/22 1112          PO Evaluation    Number of Meals 5     % PO Intake 100                     Electronically signed by:  Charlene Escalera RD  03/16/22 11:16 CDT

## 2022-03-17 ENCOUNTER — READMISSION MANAGEMENT (OUTPATIENT)
Dept: CALL CENTER | Facility: HOSPITAL | Age: 62
End: 2022-03-17

## 2022-03-17 ENCOUNTER — HOME CARE VISIT (OUTPATIENT)
Dept: HOME HEALTH SERVICES | Facility: CLINIC | Age: 62
End: 2022-03-17

## 2022-03-17 VITALS
TEMPERATURE: 97.4 F | RESPIRATION RATE: 18 BRPM | BODY MASS INDEX: 53.92 KG/M2 | OXYGEN SATURATION: 98 % | HEIGHT: 62 IN | WEIGHT: 293 LBS | SYSTOLIC BLOOD PRESSURE: 165 MMHG | HEART RATE: 67 BPM | DIASTOLIC BLOOD PRESSURE: 50 MMHG

## 2022-03-17 LAB
ALBUMIN SERPL-MCNC: 3.5 G/DL (ref 3.5–5.2)
ALBUMIN/GLOB SERPL: 0.9 G/DL
ALP SERPL-CCNC: 184 U/L (ref 39–117)
ALT SERPL W P-5'-P-CCNC: 6 U/L (ref 1–33)
ANION GAP SERPL CALCULATED.3IONS-SCNC: 9 MMOL/L (ref 5–15)
AST SERPL-CCNC: 9 U/L (ref 1–32)
BASOPHILS # BLD AUTO: 0.06 10*3/MM3 (ref 0–0.2)
BASOPHILS NFR BLD AUTO: 0.9 % (ref 0–1.5)
BILIRUB SERPL-MCNC: 0.4 MG/DL (ref 0–1.2)
BUN SERPL-MCNC: 28 MG/DL (ref 8–23)
BUN/CREAT SERPL: 11.2 (ref 7–25)
CALCIUM SPEC-SCNC: 9.1 MG/DL (ref 8.6–10.5)
CHLORIDE SERPL-SCNC: 94 MMOL/L (ref 98–107)
CO2 SERPL-SCNC: 27 MMOL/L (ref 22–29)
CREAT SERPL-MCNC: 2.51 MG/DL (ref 0.57–1)
DEPRECATED RDW RBC AUTO: 50 FL (ref 37–54)
EGFRCR SERPLBLD CKD-EPI 2021: 21 ML/MIN/1.73
EOSINOPHIL # BLD AUTO: 0.15 10*3/MM3 (ref 0–0.4)
EOSINOPHIL NFR BLD AUTO: 2.4 % (ref 0.3–6.2)
ERYTHROCYTE [DISTWIDTH] IN BLOOD BY AUTOMATED COUNT: 15.3 % (ref 12.3–15.4)
GLOBULIN UR ELPH-MCNC: 3.8 GM/DL
GLUCOSE BLDC GLUCOMTR-MCNC: 180 MG/DL (ref 70–130)
GLUCOSE BLDC GLUCOMTR-MCNC: 183 MG/DL (ref 70–130)
GLUCOSE SERPL-MCNC: 186 MG/DL (ref 65–99)
HCT VFR BLD AUTO: 26.7 % (ref 34–46.6)
HGB BLD-MCNC: 8.5 G/DL (ref 12–15.9)
IMM GRANULOCYTES # BLD AUTO: 0.04 10*3/MM3 (ref 0–0.05)
IMM GRANULOCYTES NFR BLD AUTO: 0.6 % (ref 0–0.5)
LYMPHOCYTES # BLD AUTO: 1.65 10*3/MM3 (ref 0.7–3.1)
LYMPHOCYTES NFR BLD AUTO: 25.9 % (ref 19.6–45.3)
MCH RBC QN AUTO: 28.4 PG (ref 26.6–33)
MCHC RBC AUTO-ENTMCNC: 31.8 G/DL (ref 31.5–35.7)
MCV RBC AUTO: 89.3 FL (ref 79–97)
MONOCYTES # BLD AUTO: 0.53 10*3/MM3 (ref 0.1–0.9)
MONOCYTES NFR BLD AUTO: 8.3 % (ref 5–12)
NEUTROPHILS NFR BLD AUTO: 3.94 10*3/MM3 (ref 1.7–7)
NEUTROPHILS NFR BLD AUTO: 61.9 % (ref 42.7–76)
NRBC BLD AUTO-RTO: 0 /100 WBC (ref 0–0.2)
PLATELET # BLD AUTO: 275 10*3/MM3 (ref 140–450)
PMV BLD AUTO: 8.6 FL (ref 6–12)
POTASSIUM SERPL-SCNC: 4 MMOL/L (ref 3.5–5.2)
PROT SERPL-MCNC: 7.3 G/DL (ref 6–8.5)
RBC # BLD AUTO: 2.99 10*6/MM3 (ref 3.77–5.28)
SODIUM SERPL-SCNC: 130 MMOL/L (ref 136–145)
WBC NRBC COR # BLD: 6.37 10*3/MM3 (ref 3.4–10.8)

## 2022-03-17 PROCEDURE — 97110 THERAPEUTIC EXERCISES: CPT

## 2022-03-17 PROCEDURE — 94799 UNLISTED PULMONARY SVC/PX: CPT

## 2022-03-17 PROCEDURE — 82962 GLUCOSE BLOOD TEST: CPT

## 2022-03-17 PROCEDURE — 99217 PR OBSERVATION CARE DISCHARGE MANAGEMENT: CPT | Performed by: STUDENT IN AN ORGANIZED HEALTH CARE EDUCATION/TRAINING PROGRAM

## 2022-03-17 PROCEDURE — 85025 COMPLETE CBC W/AUTO DIFF WBC: CPT | Performed by: STUDENT IN AN ORGANIZED HEALTH CARE EDUCATION/TRAINING PROGRAM

## 2022-03-17 PROCEDURE — 63710000001 INSULIN ASPART PER 5 UNITS: Performed by: STUDENT IN AN ORGANIZED HEALTH CARE EDUCATION/TRAINING PROGRAM

## 2022-03-17 PROCEDURE — G0378 HOSPITAL OBSERVATION PER HR: HCPCS

## 2022-03-17 PROCEDURE — 97530 THERAPEUTIC ACTIVITIES: CPT

## 2022-03-17 PROCEDURE — 94760 N-INVAS EAR/PLS OXIMETRY 1: CPT

## 2022-03-17 PROCEDURE — 80053 COMPREHEN METABOLIC PANEL: CPT | Performed by: STUDENT IN AN ORGANIZED HEALTH CARE EDUCATION/TRAINING PROGRAM

## 2022-03-17 PROCEDURE — 63710000001 INSULIN DETEMIR PER 5 UNITS: Performed by: STUDENT IN AN ORGANIZED HEALTH CARE EDUCATION/TRAINING PROGRAM

## 2022-03-17 PROCEDURE — 97116 GAIT TRAINING THERAPY: CPT

## 2022-03-17 RX ORDER — BUMETANIDE 2 MG/1
2 TABLET ORAL
Qty: 16 TABLET | Refills: 0 | Status: SHIPPED | OUTPATIENT
Start: 2022-03-17 | End: 2022-12-13

## 2022-03-17 RX ORDER — INSULIN LISPRO 100 [IU]/ML
12 INJECTION, SOLUTION INTRAVENOUS; SUBCUTANEOUS
Qty: 30 ML | Refills: 12 | Status: SHIPPED | OUTPATIENT
Start: 2022-03-17 | End: 2022-05-24 | Stop reason: SDUPTHER

## 2022-03-17 RX ADMIN — IPRATROPIUM BROMIDE AND ALBUTEROL SULFATE 3 ML: 2.5; .5 SOLUTION RESPIRATORY (INHALATION) at 07:11

## 2022-03-17 RX ADMIN — INSULIN ASPART 12 UNITS: 100 INJECTION, SOLUTION INTRAVENOUS; SUBCUTANEOUS at 06:46

## 2022-03-17 RX ADMIN — INSULIN ASPART 4 UNITS: 100 INJECTION, SOLUTION INTRAVENOUS; SUBCUTANEOUS at 06:46

## 2022-03-17 RX ADMIN — CLOPIDOGREL BISULFATE 75 MG: 75 TABLET ORAL at 09:38

## 2022-03-17 RX ADMIN — SODIUM BICARBONATE 1300 MG: 650 TABLET ORAL at 09:37

## 2022-03-17 RX ADMIN — BUMETANIDE 2 MG: 1 TABLET ORAL at 09:36

## 2022-03-17 RX ADMIN — ACETAMINOPHEN 650 MG: 325 TABLET, FILM COATED ORAL at 14:02

## 2022-03-17 RX ADMIN — SEVELAMER CARBONATE 800 MG: 800 TABLET, FILM COATED ORAL at 12:56

## 2022-03-17 RX ADMIN — BUDESONIDE AND FORMOTEROL FUMARATE DIHYDRATE 2 PUFF: 160; 4.5 AEROSOL RESPIRATORY (INHALATION) at 07:13

## 2022-03-17 RX ADMIN — METOPROLOL TARTRATE 25 MG: 25 TABLET, FILM COATED ORAL at 09:37

## 2022-03-17 RX ADMIN — Medication 10 ML: at 09:40

## 2022-03-17 RX ADMIN — RANOLAZINE 500 MG: 500 TABLET, FILM COATED, EXTENDED RELEASE ORAL at 09:36

## 2022-03-17 RX ADMIN — INSULIN DETEMIR 20 UNITS: 100 INJECTION, SOLUTION SUBCUTANEOUS at 09:42

## 2022-03-17 RX ADMIN — FERROUS SULFATE TAB EC 324 MG (65 MG FE EQUIVALENT) 324 MG: 324 (65 FE) TABLET DELAYED RESPONSE at 09:37

## 2022-03-17 RX ADMIN — INSULIN ASPART 12 UNITS: 100 INJECTION, SOLUTION INTRAVENOUS; SUBCUTANEOUS at 11:25

## 2022-03-17 RX ADMIN — NYSTATIN: 100000 POWDER TOPICAL at 09:39

## 2022-03-17 RX ADMIN — INSULIN ASPART 8 UNITS: 100 INJECTION, SOLUTION INTRAVENOUS; SUBCUTANEOUS at 11:16

## 2022-03-17 RX ADMIN — LEVOTHYROXINE SODIUM 25 MCG: 25 TABLET ORAL at 09:38

## 2022-03-17 RX ADMIN — ISOSORBIDE MONONITRATE 30 MG: 30 TABLET, EXTENDED RELEASE ORAL at 06:47

## 2022-03-17 RX ADMIN — SEVELAMER CARBONATE 800 MG: 800 TABLET, FILM COATED ORAL at 09:37

## 2022-03-17 RX ADMIN — GABAPENTIN 800 MG: 400 CAPSULE ORAL at 09:36

## 2022-03-17 RX ADMIN — OXYBUTYNIN CHLORIDE 5 MG: 5 TABLET, EXTENDED RELEASE ORAL at 09:37

## 2022-03-17 RX ADMIN — LISINOPRIL 5 MG: 5 TABLET ORAL at 09:37

## 2022-03-17 RX ADMIN — IPRATROPIUM BROMIDE AND ALBUTEROL SULFATE 3 ML: 2.5; .5 SOLUTION RESPIRATORY (INHALATION) at 10:53

## 2022-03-17 RX ADMIN — LIDOCAINE 1 PATCH: 50 PATCH CUTANEOUS at 09:35

## 2022-03-17 NOTE — THERAPY TREATMENT NOTE
Patient Name: Yamileth Slater  : 1960    MRN: [id number]                              Today's Date: 3/17/2022       Admit Date: 3/14/2022    Visit Dx:     ICD-10-CM ICD-9-CM   1. Precordial pain  R07.2 786.51   2. Impaired functional mobility, balance, gait, and endurance  Z74.09 V49.89   3. Impaired mobility and ADLs  Z74.09 V49.89    Z78.9    4. Congestive heart failure, unspecified HF chronicity, unspecified heart failure type ([facility])  I50.9 428.0     Patient Active Problem List   Diagnosis   • Chronic obstructive pulmonary disease ([facility])   • Chronic midline low back pain without sciatica   • Vitamin D deficiency   • Tobacco dependence syndrome   • Surgical follow-up care   • Shoulder joint pain   • Peripheral vascular disease ([facility])   • Pain   • Neurologic disorder associated with diabetes mellitus ([facility])   • Morbid obesity with BMI of 50.0-59.9, adult ([facility])   • Mixed hyperlipidemia   • Kidney stone   • History of colon polyps   • GERD (gastroesophageal reflux disease)   • Excoriated eczema   • Hypertension   • Encounter for medication refill   • COPD (chronic obstructive pulmonary disease) ([facility])   • Chronic folliculitis   • Coronary artery disease involving native coronary artery of native heart without angina pectoris   • Mild persistent asthma   • 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   • Hypothyroidism   • Carotid artery disease ([facility])   • Current smoker   • [place] abuse   • PAD (peripheral artery disease) ([facility])   • Intercostal pain   • Venous insufficiency   • LAFB (left anterior fascicular block)   • Pulmonary hypertension ([facility])   • Left hip pain   • Neck pain   • MIKE and COPD overlap syndrome ([facility])   • Pneumonia due to COVID-19 virus   • Hyperkalemia   • Cutaneous candidiasis   • Community acquired pneumonia of right middle lobe of lung   • MRSA infection   • Alkaline phosphatase elevation   • COVID-19 ruled out by laboratory testing   • Esophageal dysphagia   • Monilial  esophagitis ([facility])   • NSTEMI, initial episode of care ([facility])   • Pneumonia due to infectious organism   • Cellulitis of left leg   • Hyponatremia   • Urinary tract infection due to Proteus   • History of insertion of tunneled central venous catheter (CVC) with port   • Anemia due to chronic kidney disease, on chronic dialysis ([facility])   • Pneumonitis   • Personal history of noncompliance with medical treatment, presenting hazards to health   • Type 2 diabetes mellitus with kidney complication, with long-term current use of insulin ([facility])   • Physical deconditioning   • ESRD on hemodialysis ([facility])     Past Medical History:   Diagnosis Date   • Acute blood loss anemia 4/16/2017    Likely due to gastric oozing at this time. - Dr. Duarte (GI) was consulted and has now signed off, will follow up outpatient - pill colonoscopy showed AVMs - continue to monitor   • Acute cystitis with hematuria 3/31/2021    1/13: IV Rocephin 1 gm q 24 1/14 : transitioned  to omnicef 300mg. Urine cultures resulted and did not show growth. Omnicef discontinued as patient is asymptomatic   • Altered mental status 1/9/2022    - AMS on presentation - initial ABG pH 7.3, CO2 34 - Procal 0.29 - UA negative for acute cysitits -CTA head wnl  - Empiric Zosyn and Vancomycin -Lactate 2.5 on admission  - blood cultures no growth at 24 hours     • Anxiety    • CAD (coronary artery disease) 4/24/2021    S/P 3 stents 5/1/2021 for [facility] Continue ASA 81mg & Clopidogrel 75mg Continue Atorvastatin 40mg   • Carotid artery stenosis    • Chronic obstructive lung disease ([facility])    • CKD (chronic kidney disease) stage 4, GFR 15-29 ml/min ([facility])    • CKD (chronic kidney disease), symptom management only, stage 5 ([facility]) 10/5/2020    Results from last 7 days Lab Units 12/15/21 0548 12/14/21 1323 12/14/21 0916 CREATININE mg/dL 3.92* 3.21* 3.32*  Baseline creatinine 2-3 GFR 13-25 GFR 15 Dialysis MWF, sees Dr. Lauren Nephrology consult,, appreciate recommendations Continue Bumex  1mg bid daily Holding Bumex 2mg 4 times a week   • Colonic polyp    • Coronary arteriosclerosis    • Diabetes mellitus (HCC)    • Diabetic neuropathy ([facility])    • Ear pain, right 10/18/2021    - canal trauma due to patient scratching and DMT2 - added cortisporin ear drops   • Elevated troponin 10/12/2021    -most likely from CKD -Trending down -Neg chest pain   • GERD (gastroesophageal reflux disease)    • GI bleed 5/13/2021    - GI will follow up outpatient - Protonix 40mg daily - Avoid medical DVT prophy and use mechanical at this time instead. - Continue to monitor - pill colonoscopy results showed AVMs   • History of transfusion    • Hypercholesterolemia    • Hypertension    • Hypomagnesemia 6/27/2021    Monitor and replace   • Morbid obesity ([facility])    • Nephrolithiasis    • Peripheral vascular disease ([facility])    • SIRS (systemic inflammatory response syndrome) ([facility]) 1/9/2022    Admission  - WBC 17.78   -   - RR 16 - 1/10: VSS/wnl - CXR - Mild pulm edema - Blood cultures no growth at 24 hours  - Procalcitonin 0.29 - UA : glucose 1000, negative Leucocytes/nitrate - Empiric Zosyn and Vancomcyin    • Sleep apnea    • Substance abuse ([facility])    • Vitamin D deficiency      Past Surgical History:   Procedure Laterality Date   • CARDIAC CATHETERIZATION N/A 7/14/2020   • CARDIAC CATHETERIZATION N/A 4/23/2021    Procedure: Left Heart Cath;  Surgeon: Melba Romo MD;  Location: [facility] INVASIVE LOCATION;  Service: Cardiology;  Laterality: N/A;   • CARDIAC CATHETERIZATION N/A 4/30/2021    Procedure: Percutaneous Coronary Intervention;  Surgeon: Russell Voss MD;  Location: [facility] CATH INVASIVE LOCATION;  Service: Cardiovascular;  Laterality: N/A;   • CARDIAC CATHETERIZATION N/A 4/30/2021    Procedure: Stent NIKKI coronary;  Surgeon: Russell Voss MD;  Location: [facility] CATH INVASIVE LOCATION;  Service: Cardiovascular;  Laterality: N/A;   • CARDIAC CATHETERIZATION Left 11/13/2021    Procedure: Left  Heart Cath;  Surgeon: Niall Rios MD;  Location: [facility] CATH INVASIVE LOCATION;  Service: Cardiology;  Laterality: Left;   • CAROTID STENT Left    • COLONOSCOPY     • COLONOSCOPY N/A 5/14/2021    Procedure: COLONOSCOPY;  Surgeon: Mingo Duarte MD;  Location: [facility] ENDOSCOPY;  Service: Gastroenterology;  Laterality: N/A;   • CORONARY ARTERY BYPASS GRAFT N/A 2013    CABG X 3   • CYSTOSCOPY BLADDER STONE LITHOTRIPSY Bilateral    • ENDOSCOPY N/A 4/12/2021    Procedure: ESOPHAGOGASTRODUODENOSCOPY;  Surgeon: Mingo Duarte MD;  Location: [facility] ENDOSCOPY;  Service: Gastroenterology;  Laterality: N/A;   • ENDOSCOPY N/A 5/14/2021    Procedure: ESOPHAGOGASTRODUODENOSCOPY;  Surgeon: Mingo Duarte MD;  Location: [facility] ENDOSCOPY;  Service: Gastroenterology;  Laterality: N/A;   • ENDOSCOPY N/A 1/28/2022    Procedure: ESOPHAGOGASTRODUODENOSCOPY;  Surgeon: Mingo Duarte MD;  Location: [facility] ENDOSCOPY;  Service: Gastroenterology;  Laterality: N/A;   • INTERVENTIONAL RADIOLOGY PROCEDURE N/A 10/21/2021    Procedure: tunneled central venous catheter placement;  Surgeon: Donnie Robles MD;  Location: [facility] ANGIO INVASIVE LOCATION;  Service: Interventional Radiology;  Laterality: N/A;   • INTERVENTIONAL RADIOLOGY PROCEDURE N/A 1/27/2022    Procedure: tunneled central venous catheter placement;  Surgeon: Donnie Robles MD;  Location: [facility] INVASIVE LOCATION;  Service: Interventional Radiology;  Laterality: N/A;      General Information     Row Name 03/17/22 1624          OT Time and Intention    Document Type therapy note (daily note)  -LM     Mode of Treatment individual therapy  -LM           User Key  (r) = Recorded By, (t) = Taken By, (c) = Cosigned By    Initials Name Provider Type    LM Sigrid Ross COTA Occupational Therapy Assistant                 Mobility/ADL's     Row Name 03/17/22 7629          Bed Mobility    Bed Mobility bed mobility (all) activities  -LM     All  Activities, [place] (Bed Mobility) supervision  -LM     Supine-Sit [place] (Bed Mobility) supervision  -LM     Sit-Supine [place] (Bed Mobility) supervision  -     Row Name 03/17/22 1624          Transfers    Transfers bed-chair transfer;sit-stand transfer;stand-sit transfer  -LM     Bed-Chair [place] (Transfers) contact guard  -LM     Sit-Stand [place] (Transfers) supervision  -     Row Name 03/17/22 1624          Activities of Daily Living    BADL Assessment/Intervention bathing;upper body dressing;lower body dressing;grooming  -LM     Row Name 03/17/22 1624          Bathing Assessment/Intervention    [place] Level (Bathing) bathing skills;lower body;upper body;upper extremities;chest/trunk;distal lower extremities/feet;proximal lower extremities;perineal area;moderate assist (50% patient effort);minimum assist (75% patient effort)  -     Row Name 03/17/22 1624          Upper Body Dressing Assessment/Training    [place] Level (Upper Body Dressing) upper body dressing skills;doff;don;pull-over garment;independent  -LM           User Key  (r) = Recorded By, (t) = Taken By, (c) = Cosigned By    Initials Name Provider Type    LM Sigrid Ross COTA Occupational Therapy Assistant               Obj/Interventions    No documentation.                Goals/Plan     [facility] Name 03/17/22 1635          Transfer Goal 1 (OT)    Activity/Assistive Device (Transfer Goal 1, OT) toilet  -LM     [place] Level/Cues Needed (Transfer Goal 1, OT) supervision required  -LM     Time Frame (Transfer Goal 1, OT) long term goal (LTG);by discharge  -LM     Progress/Outcome (Transfer Goal 1, OT) goal not met  -     Row Name 03/17/22 1635          Bathing Goal 1 (OT)    Activity/Device (Bathing Goal 1, OT) lower body bathing  -LM     [place] Level/Cues Needed (Bathing Goal 1, OT) minimum assist (75% or more patient effort)  -LM     Time Frame (Bathing Goal 1, OT) long term goal (LTG);by  discharge  -LM     Progress/Outcomes (Bathing Goal 1, OT) goal not met  -LM     Row Name 03/17/22 1635          Dressing Goal 1 (OT)    Activity/Device (Dressing Goal 1, OT) lower body dressing  AD as needed  -LM     [place]/Cues Needed (Dressing Goal 1, OT) minimum assist (75% or more patient effort)  -LM     Time Frame (Dressing Goal 1, OT) long term goal (LTG);by discharge  -LM     Progress/Outcome (Dressing Goal 1, OT) goal not met  -LM           User Key  (r) = Recorded By, (t) = Taken By, (c) = Cosigned By    Initials Name Provider Type    Sigrid Dunn COTA Occupational Therapy Assistant               Clinical Impression    No documentation.                Outcome Measures    No documentation.                 Occupational Therapy Education                 Title: PT OT SLP Therapies (In Progress)     Topic: Occupational Therapy (In Progress)     Point: ADL training (Not Started)     Description:   Instruct learner(s) on proper safety adaptation and remediation techniques during self care or transfers.   Instruct in proper use of assistive devices.              Learner Progress:  Not documented in this visit.          Point: Home exercise program (Not Started)     Description:   Instruct learner(s) on appropriate technique for monitoring, assisting and/or progressing therapeutic exercises/activities.              Learner Progress:  Not documented in this visit.          Point: Precautions (Done)     Description:   Instruct learner(s) on prescribed precautions during self-care and functional transfers.              Learning Progress Summary           Patient Acceptance, E, VU by ME at 3/15/2022 0846    Comment: Educated on OT and POC. Educated on safety precautions. Educated to call for assistance.                   Point: Body mechanics (Done)     Description:   Instruct learner(s) on proper positioning and spine alignment during self-care, functional mobility activities and/or exercises.               Learning Progress Summary           Patient Acceptance, E, VU by ME at 3/15/2022 0846    Comment: Educated on OT and POC. Educated on safety precautions. Educated to call for assistance.                               User Key     Initials Effective Dates Name Provider Type Discipline    ME 06/16/21 -  Nikole Espino, OTR/L Occupational Therapist OT              OT Recommendation and Plan           Time Calculation:    Time Calculation- OT     Row Name 03/17/22 1622             Time Calculation- OT    OT Start Time 1135  -LM      OT Stop Time 1200  -LM      OT Time Calculation (min) 25 min  -LM      Total Timed Code Minutes- OT 25 minute(s)  -LM      OT Received On 03/17/22  -LM              Timed Charges    28891 - OT Therapeutic Activity Minutes 10  -LM      61540 - OT Self Care/Mgmt Minutes 15  -LM              Total Minutes    Timed Charges Total Minutes 25  -LM       Total Minutes 25  -LM            User Key  (r) = Recorded By, (t) = Taken By, (c) = Cosigned By    Initials Name Provider Type    LM Sigrid Ross COTA Occupational Therapy Assistant              Therapy Charges for Today     Code Description Service Date Service Provider Modifiers Qty    87238214575 HC OT THERAPEUTIC ACT EA 15 MIN 3/17/2022 Sigrid Ross COTA GO 1    58443197562 HC OT THER PROC EA 15 MIN 3/17/2022 Sigrid Ross COTA GO 1               ISHAN Kerns  3/17/2022

## 2022-03-17 NOTE — PROGRESS NOTES
FAMILY MEDICINE RESIDENCY SERVICE  DAILY PROGRESS NOTE    NAME: Yamileth Slater  : 1960  MRN: [id number]      LOS: 0 days     PROVIDER OF SERVICE: Perez Hooker MD    Chief Complaint: ESRD on hemodialysis ([facility]CC)    Subjective:     Interval History:  History taken from: patient chart  Patient Complaints: No new concerns. Is breathing better and feeling better. Sitting up at bedside reading paper during encounter. Reports she was able to walk to the bathroom without issue.     Review of Systems:   Review of Systems   Constitutional: Negative for fever.   Respiratory: Negative for shortness of breath.    Cardiovascular: Negative for chest pain and leg swelling.   Gastrointestinal: Negative for abdominal pain, constipation, diarrhea, nausea and vomiting.   Neurological: Positive for headaches. Negative for dizziness and light-headedness.       Objective:     Vital Signs  Temp:  [97 °F (36.1 °C)-97.7 °F (36.5 °C)] 97.4 °F (36.3 °C)  Heart Rate:  [65-76] 70  Resp:  [18-20] 18  BP: (129-171)/(58-75) 171/72  Flow (L/min):  [3] 3   Body mass index is 54.91 kg/m².    Physical Exam  Physical Exam  Vitals reviewed.   Constitutional:       General: She is not in acute distress.     Appearance: She is obese.      Interventions: Nasal cannula in place.   HENT:      Head: Normocephalic and atraumatic.   Eyes:      Conjunctiva/sclera: Conjunctivae normal.   Cardiovascular:      Rate and Rhythm: Normal rate and regular rhythm.      Pulses: Normal pulses.   Pulmonary:      Effort: Pulmonary effort is normal.      Breath sounds: No rhonchi or rales.   Abdominal:      Palpations: Abdomen is soft.      Tenderness: There is no abdominal tenderness.   Musculoskeletal:      Right lower leg: No edema.      Left lower leg: No edema.   Skin:     General: Skin is warm.   Neurological:      General: No focal deficit present.      Mental Status: She is alert.   Psychiatric:         Mood and Affect: Mood normal.          Behavior: Behavior normal.         Scheduled Meds   atorvastatin, 20 mg, Oral, Nightly  budesonide-formoterol, 2 puff, Inhalation, BID - RT  bumetanide, 2 mg, Oral, Once per day on Sun Tue Thu Sat  clopidogrel, 75 mg, Oral, Daily  epoetin hubert/hubert-epbx, 10,000 Units, Intravenous, Once per day on Mon Wed Fri  ferrous sulfate, 324 mg, Oral, Daily With Breakfast  gabapentin, 800 mg, Oral, TID  insulin aspart, 0-24 Units, Subcutaneous, TID AC  insulin aspart, 12 Units, Subcutaneous, TID With Meals  insulin detemir, 20 Units, Subcutaneous, Q12H  ipratropium-albuterol, 3 mL, Nebulization, 4x Daily - RT  isosorbide mononitrate, 30 mg, Oral, QAM  levothyroxine, 25 mcg, Oral, Daily  lidocaine, 1 patch, Transdermal, Q24H  lisinopril, 5 mg, Oral, Daily  metoprolol tartrate, 25 mg, Oral, Q12H  montelukast, 10 mg, Oral, Nightly  nystatin, , Topical, TID  oxybutynin XL, 5 mg, Oral, Daily  pantoprazole, 40 mg, Oral, Nightly  ranolazine, 500 mg, Oral, Q12H  sennosides-docusate, 2 tablet, Oral, BID  sevelamer, 800 mg, Oral, TID With Meals  sodium bicarbonate, 1,300 mg, Oral, Daily  sodium chloride, 10 mL, Intravenous, Q12H       PRN Meds   •  acetaminophen  •  albuterol  •  calcium carbonate  •  Capsaicin  •  cyclobenzaprine  •  dextrose  •  dextrose  •  glucagon (human recombinant)  •  heparin (porcine)  •  heparin (porcine)  •  hydrALAZINE  •  melatonin  •  nitroglycerin  •  ondansetron **OR** ondansetron  •  sodium chloride  •  sodium chloride      Diagnostic Data    Results from last 7 days   Lab Units 03/17/22  0545   WBC 10*3/mm3 6.37   HEMOGLOBIN g/dL 8.5*   HEMATOCRIT % 26.7*   PLATELETS 10*3/mm3 275   GLUCOSE mg/dL 186*   CREATININE mg/dL 2.51*   BUN mg/dL 28*   SODIUM mmol/L 130*   POTASSIUM mmol/L 4.0   AST (SGOT) U/L 9   ALT (SGPT) U/L 6   ALK PHOS U/L 184*   BILIRUBIN mg/dL 0.4   ANION GAP mmol/L 9.0       XR Chest 1 View    Result Date: 3/16/2022  Cardiomegaly with improving pulmonary vascular congestion and  persistent small pleural effusions. Electronically signed by:  Jerman Corrales MD  3/16/2022 10:04 AM CDT Workstation: [id number]      I reviewed the patient's new clinical results.    Assessment/Plan:     Active Hospital Problems    Diagnosis  POA   • **ESRD on hemodialysis ([facility]) [N18.6, Z99.2]  Not Applicable     Cr 3.9, baseline around ~3  - MWF Dialysis patient   - Nephrology consulted, appreciate recs.   Hemodialysis yesterday     • Chronic obstructive pulmonary disease ([facility]) [J44.9]  Yes     Priority: High     Symbicort  - Duo nebs and albuterol as needed  - Continue montelukast and cetirizine  - 3L at home, continue here. Increase to maintain sats 88-92%       • Type 2 diabetes mellitus with kidney complication, with long-term current use of insulin ([facility]) [E11.29, Z79.4]  Not Applicable     Priority: Medium     Glucose was 88 in ED  Levemir 20 at night and in AM, aspart 12 prandial  High dose SSI   - Gabapentin      • Physical deconditioning [R53.81]  Yes     - PT/OT   - fall precautions      • Anemia due to chronic kidney disease, on chronic dialysis ([facility]) [N18.6, D63.1, Z99.2]  Not Applicable     7.1 Hgb at admission  -received 1 unit of PRBCs    -Hgb greater than 8 after HD       • Hypertension [I10]  Yes     - Metoprolol 25mg bid  -Hydralzine PRN > 170 SBP     • GERD (gastroesophageal reflux disease) [K21.9]  Yes     Protonix 40mg PO     • Morbid obesity with BMI of 50.0-59.9, adult ([facility]) [E66.01, Z68.43]  Not Applicable     Body mass index is 56.52 kg/m².  - Consistent carb, cardiac, renal diet         DVT prophylaxis: Heparin  Code status is   Code Status and Medical Interventions:   Ordered at: 03/14/22 1930     Level Of Support Discussed With:    Patient     Code Status (Patient has no pulse and is not breathing):    CPR (Attempt to Resuscitate)     Medical Interventions (Patient has pulse or is breathing):    Full Support     Comments:    Wants her son to be surrogate decison maker       Plan for  disposition:Where: home health and When:  today      Time: 30 minutes      This document has been electronically signed by Perez Hooker MD on March 17, 2022 06:57 CDT

## 2022-03-17 NOTE — THERAPY TREATMENT NOTE
Acute Care - Physical Therapy Treatment Note  [facility]     Patient Name: Yamileth Slater  : 1960  MRN: [id number]  Today's Date: 3/17/2022      Visit Dx:     ICD-10-CM ICD-9-CM   1. Precordial pain  R07.2 786.51   2. Impaired functional mobility, balance, gait, and endurance  Z74.09 V49.89   3. Impaired mobility and ADLs  Z74.09 V49.89    Z78.9    4. Congestive heart failure, unspecified HF chronicity, unspecified heart failure type ([facility])  I50.9 428.0     Patient Active Problem List   Diagnosis   • Chronic obstructive pulmonary disease ([facility])   • Chronic midline low back pain without sciatica   • Vitamin D deficiency   • Tobacco dependence syndrome   • Surgical follow-up care   • Shoulder joint pain   • Peripheral vascular disease ([facility])   • Pain   • Neurologic disorder associated with diabetes mellitus ([facility])   • Morbid obesity with BMI of 50.0-59.9, adult ([facility])   • Mixed hyperlipidemia   • Kidney stone   • History of colon polyps   • GERD (gastroesophageal reflux disease)   • Excoriated eczema   • Hypertension   • Encounter for medication refill   • COPD (chronic obstructive pulmonary disease) ([facility])   • Chronic folliculitis   • Coronary artery disease involving native coronary artery of native heart without angina pectoris   • Mild persistent asthma   • 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   • Hypothyroidism   • Carotid artery disease ([facility])   • Current smoker   • Marihuana abuse   • PAD (peripheral artery disease) ([facility])   • Intercostal pain   • Venous insufficiency   • LAFB (left anterior fascicular block)   • Pulmonary hypertension ([facility])   • Left hip pain   • Neck pain   • MIKE and COPD overlap syndrome ([facility])   • Pneumonia due to COVID-19 virus   • Hyperkalemia   • Cutaneous candidiasis   • Community acquired pneumonia of right middle lobe of lung   • MRSA infection   • Alkaline phosphatase elevation   • COVID-19 ruled out by laboratory testing   • Esophageal dysphagia   •  Monilial esophagitis ([facility])   • NSTEMI, initial episode of care ([facility])   • Pneumonia due to infectious organism   • Cellulitis of left leg   • Hyponatremia   • Urinary tract infection due to Proteus   • History of insertion of tunneled central venous catheter (CVC) with port   • Anemia due to chronic kidney disease, on chronic dialysis ([facility])   • Pneumonitis   • Personal history of noncompliance with medical treatment, presenting hazards to health   • Type 2 diabetes mellitus with kidney complication, with long-term current use of insulin ([facility])   • Physical deconditioning   • ESRD on hemodialysis ([facility])     Past Medical History:   Diagnosis Date   • Acute blood loss anemia 4/16/2017    Likely due to gastric oozing at this time. - Dr. Duarte (GI) was consulted and has now signed off, will follow up outpatient - pill colonoscopy showed AVMs - continue to monitor   • Acute cystitis with hematuria 3/31/2021    1/13: IV Rocephin 1 gm q 24 1/14 : transitioned  to omnicef 300mg. Urine cultures resulted and did not show growth. Omnicef discontinued as patient is asymptomatic   • Altered mental status 1/9/2022    - AMS on presentation - initial ABG pH 7.3, CO2 34 - Procal 0.29 - UA negative for acute cysitits -CTA head wnl  - Empiric Zosyn and Vancomycin -Lactate 2.5 on admission  - blood cultures no growth at 24 hours     • Anxiety    • CAD (coronary artery disease) 4/24/2021    S/P 3 stents 5/1/2021 for [facility] Continue ASA 81mg & Clopidogrel 75mg Continue Atorvastatin 40mg   • Carotid artery stenosis    • Chronic obstructive lung disease ([facility])    • CKD (chronic kidney disease) stage 4, GFR 15-29 ml/min ([facility])    • CKD (chronic kidney disease), symptom management only, stage 5 ([facility]) 10/5/2020    Results from last 7 days Lab Units 12/15/21 0548 12/14/21 1323 12/14/21 0916 CREATININE mg/dL 3.92* 3.21* 3.32*  Baseline creatinine 2-3 GFR 13-25 GFR 15 Dialysis MWF, sees Dr. Lauren Nephrology consult,, appreciate recommendations Continue  Bumex 1mg bid daily Holding Bumex 2mg 4 times a week   • Colonic polyp    • Coronary arteriosclerosis    • Diabetes mellitus (HCC)    • Diabetic neuropathy ([facility])    • Ear pain, right 10/18/2021    - canal trauma due to patient scratching and DMT2 - added cortisporin ear drops   • Elevated troponin 10/12/2021    -most likely from CKD -Trending down -Neg chest pain   • GERD (gastroesophageal reflux disease)    • GI bleed 5/13/2021    - GI will follow up outpatient - Protonix 40mg daily - Avoid medical DVT prophy and use mechanical at this time instead. - Continue to monitor - pill colonoscopy results showed AVMs   • History of transfusion    • Hypercholesterolemia    • Hypertension    • Hypomagnesemia 6/27/2021    Monitor and replace   • Morbid obesity ([facility])    • Nephrolithiasis    • Peripheral vascular disease ([facility])    • SIRS (systemic inflammatory response syndrome) ([facility]) 1/9/2022    Admission  - WBC 17.78   -   - RR 16 - 1/10: VSS/wnl - CXR - Mild pulm edema - Blood cultures no growth at 24 hours  - Procalcitonin 0.29 - UA : glucose 1000, negative Leucocytes/nitrate - Empiric Zosyn and Vancomcyin    • Sleep apnea    • Substance abuse ([facility])    • Vitamin D deficiency      Past Surgical History:   Procedure Laterality Date   • CARDIAC CATHETERIZATION N/A 7/14/2020   • CARDIAC CATHETERIZATION N/A 4/23/2021    Procedure: Left Heart Cath;  Surgeon: Melba Romo MD;  Location: [facility] INVASIVE LOCATION;  Service: Cardiology;  Laterality: N/A;   • CARDIAC CATHETERIZATION N/A 4/30/2021    Procedure: Percutaneous Coronary Intervention;  Surgeon: Russell Voss MD;  Location: [facility] CATH INVASIVE LOCATION;  Service: Cardiovascular;  Laterality: N/A;   • CARDIAC CATHETERIZATION N/A 4/30/2021    Procedure: Stent NIKKI coronary;  Surgeon: Russell oVss MD;  Location: [facility] CATH INVASIVE LOCATION;  Service: Cardiovascular;  Laterality: N/A;   • CARDIAC CATHETERIZATION Left 11/13/2021     Procedure: Left Heart Cath;  Surgeon: Niall Rios MD;  Location: [facility] CATH INVASIVE LOCATION;  Service: Cardiology;  Laterality: Left;   • CAROTID STENT Left    • COLONOSCOPY     • COLONOSCOPY N/A 5/14/2021    Procedure: COLONOSCOPY;  Surgeon: Mingo Duarte MD;  Location: [facility] ENDOSCOPY;  Service: Gastroenterology;  Laterality: N/A;   • CORONARY ARTERY BYPASS GRAFT N/A 2013    CABG X 3   • CYSTOSCOPY BLADDER STONE LITHOTRIPSY Bilateral    • ENDOSCOPY N/A 4/12/2021    Procedure: ESOPHAGOGASTRODUODENOSCOPY;  Surgeon: Mingo Duarte MD;  Location: [facility] ENDOSCOPY;  Service: Gastroenterology;  Laterality: N/A;   • ENDOSCOPY N/A 5/14/2021    Procedure: ESOPHAGOGASTRODUODENOSCOPY;  Surgeon: Mingo Duarte MD;  Location: [facility] ENDOSCOPY;  Service: Gastroenterology;  Laterality: N/A;   • ENDOSCOPY N/A 1/28/2022    Procedure: ESOPHAGOGASTRODUODENOSCOPY;  Surgeon: Mingo Duarte MD;  Location: [facility] ENDOSCOPY;  Service: Gastroenterology;  Laterality: N/A;   • INTERVENTIONAL RADIOLOGY PROCEDURE N/A 10/21/2021    Procedure: tunneled central venous catheter placement;  Surgeon: Donnie Robles MD;  Location: [facility]GIO INVASIVE LOCATION;  Service: Interventional Radiology;  Laterality: N/A;   • INTERVENTIONAL RADIOLOGY PROCEDURE N/A 1/27/2022    Procedure: tunneled central venous catheter placement;  Surgeon: Donnie Robles MD;  Location: [facility] INVASIVE LOCATION;  Service: Interventional Radiology;  Laterality: N/A;     PT Assessment (last 12 hours)     PT Evaluation and Treatment     Row Name 03/17/22 1315          Physical Therapy Time and Intention    Subjective Information complains of  chest heavy  -LN     Document Type therapy note (daily note)  -LN     Mode of Treatment physical therapy  -LN     Row Name 03/17/22 1315          General Information    Patient Profile Reviewed yes  -LN     Existing Precautions/Restrictions fall;oxygen therapy device and L/min  -LN      Row Name 03/17/22 1315          Pain    Pretreatment Pain Rating 5/10  -LN     Posttreatment Pain Rating 7/10  -LN     Pain Location - Side/Orientation Right  -LN     Pain Location - knee;ear  jaw  -LN     Pain Intervention(s) Nursing Notified;Medication (See MAR)  -LN     Row Name 03/17/22 1315          Cognition    Orientation Status (Cognition) oriented x 4  -LN     [facility] Name 03/17/22 1315          Range of Motion Comprehensive    General Range of Motion bilateral lower extremity ROM WFL  -LN     Row Name 03/17/22 1315          Bed Mobility    Bed Mobility supine-sit;sit-supine  -LN     Supine-Sit [place] (Bed Mobility) not tested  -LN     Sit-Supine [place] (Bed Mobility) not tested  -LN     Assistive Device (Bed Mobility) head of bed elevated;bed rails  -LN     [facility] Name 03/17/22 1315          Transfers    Sit-Stand [place] (Transfers) standby assist  -LN     Stand-Sit [place] (Transfers) standby assist  -LN     [place] Level (Toilet Transfer) contact guard  -LN     Assistive Device (Toilet Transfer) walker, front-wheeled  -LN     Row Name 03/17/22 1315          Sit-Stand Transfer    Assistive Device (Sit-Stand Transfers) walker, front-wheeled  -LN     Row Name 03/17/22 1315          Stand-Sit Transfer    Assistive Device (Stand-Sit Transfers) walker, front-wheeled  -LN     Row Name 03/17/22 1315          Gait/Stairs (Locomotion)    [place] Level (Gait) contact guard  -LN     Assistive Device (Gait) walker, front-wheeled  -LN     Distance in Feet (Gait) 25'x2  -LN     Deviations/Abnormal Patterns (Gait) gait speed decreased  -LN     Row Name 03/17/22 1315          Safety Issues, Functional Mobility    Impairments Affecting Function (Mobility) strength;endurance/activity tolerance;balance;pain  -LN     Row Name 03/17/22 1315          Motor Skills    Therapeutic Exercise hip;knee;ankle  -[facility] Name 03/17/22 1315          Hip (Therapeutic Exercise)    Hip (Therapeutic Exercise)  AROM (active range of motion)  -LN     Hip AROM (Therapeutic Exercise) bilateral;flexion;aBduction;aDduction  -LN     Row Name 03/17/22 1315          Knee (Therapeutic Exercise)    Knee (Therapeutic Exercise) AROM (active range of motion)  -LN     Knee AROM (Therapeutic Exercise) bilateral;LAQ (long arc quad)  -LN     Row Name 03/17/22 1315          Ankle (Therapeutic Exercise)    Ankle (Therapeutic Exercise) AROM (active range of motion)  -LN     Ankle AROM (Therapeutic Exercise) bilateral;dorsiflexion;plantarflexion  -LN     Row Name 03/17/22 1315          Plan of Care Review    Plan of Care Reviewed With patient  -LN     Outcome Evaluation sit-stand-sit sba of 1,amb 25'x2 with rw and cga of 1;sats % with activity  -LN     Row Name 03/17/22 1315          Vital Signs    Pre Systolic BP Rehab 149  -LN     Pre Treatment Diastolic BP 67  -LN     Post Systolic BP Rehab 148  -LN     Post Treatment Diastolic BP 68  -LN     Pretreatment Heart Rate (beats/min) 70  -LN     Intratreatment Heart Rate (beats/min) 67  -LN     Posttreatment Heart Rate (beats/min) 68  -LN     Pre SpO2 (%) 95  -LN     O2 Delivery Pre Treatment supplemental O2  -LN     Intra SpO2 (%) 100  -LN     Post SpO2 (%) 98  -LN     Pre Patient Position Sitting  -LN     Intra Patient Position Standing  -LN     Post Patient Position Sitting  -LN     Row Name 03/17/22 1315          Positioning and Restraints    Post Treatment Position chair  -LN     In Chair notified nsg;sitting;call light within reach;encouraged to call for assist  -LN     Row Name 03/17/22 1315          Therapy Assessment/Plan (PT)    Rehab Potential (PT) good, to achieve stated therapy goals  -LN     Criteria for Skilled Interventions Met (PT) yes;skilled treatment is necessary  -LN     Row Name 03/17/22 1315          Bed Mobility Goal 1 (PT)    Activity/Assistive Device (Bed Mobility Goal 1, PT) sit to supine/supine to sit  -LN     [place] Level/Cues Needed (Bed Mobility Goal 1,  PT) independent  -LN     Time Frame (Bed Mobility Goal 1, PT) by discharge  -LN     Strategies/Barriers (Bed Mobility Goal 1, PT) HOB flat, no bed rails.  -LN     Progress/Outcomes (Bed Mobility Goal 1, PT) goal not met  -LN     Row Name 03/17/22 1315          Transfer Goal 1 (PT)    Activity/Assistive Device (Transfer Goal 1, PT) sit-to-stand/stand-to-sit;bed-to-chair/chair-to-bed;walker, rolling  -LN     [place] Level/Cues Needed (Transfer Goal 1, PT) modified independence  -LN     Time Frame (Transfer Goal 1, PT) by discharge  -LN     Strategies/Barriers (Transfers Goal 1, PT) Hx of falls.  -LN     Progress/Outcome (Transfer Goal 1, PT) goal not met  -LN     Row Name 03/17/22 1315          Gait Training Goal 1 (PT)    Activity/Assistive Device (Gait Training Goal 1, PT) walker, rolling  -LN     [place] Level (Gait Training Goal 1, PT) modified independence  -LN     Distance (Gait Training Goal 1, PT) 50'x2.  -LN     Strategies/Barriers (Gait Training Goal 1, PT) Maintain SpO2 >90%.  -LN     Progress/Outcome (Gait Training Goal 1, PT) goal not met  -LN           User Key  (r) = Recorded By, (t) = Taken By, (c) = Cosigned By    Initials Name Provider Type    Lor Mcgrath PTA Physical Therapy Assistant                Physical Therapy Education                 Title: PT OT SLP Therapies (In Progress)     Topic: Physical Therapy (In Progress)     Point: Mobility training (Done)     Learning Progress Summary           Patient Acceptance, E, VU by ROSANNA at 3/15/2022 0851    Comment: PT POC, transfer technique, use of walker.                   Point: Home exercise program (Not Started)     Learner Progress:  Not documented in this visit.          Point: Body mechanics (Not Started)     Learner Progress:  Not documented in this visit.          Point: Precautions (Not Started)     Learner Progress:  Not documented in this visit.                      User Key     Initials Effective Dates Name Provider Type  Discipline    CZ 06/16/21 -  Christopher Arnett, PT Physical Therapist PT              PT Recommendation and Plan  Anticipated Discharge Disposition (PT): home with assist, home with home health  Therapy Frequency (PT): other (see comments) (3-7 days/week)  Plan of Care Reviewed With: patient  Outcome Evaluation: sit-stand-sit sba of 1,amb 25'x2 with rw and cga of 1;sats % with activity       Time Calculation:    PT Charges     Row Name 03/17/22 1427             Time Calculation    Start Time 1315  -LN      Stop Time 1415  -LN      Time Calculation (min) 60 min  -LN      PT Received On 03/17/22  -LN              Time Calculation- PT    Total Timed Code Minutes- PT 60 minute(s)  -LN            User Key  (r) = Recorded By, (t) = Taken By, (c) = Cosigned By    Initials Name Provider Type    Lor Mcgrath PTA Physical Therapy Assistant              Therapy Charges for Today     Code Description Service Date Service Provider Modifiers Qty    25915699092 HC PT THERAPEUTIC ACT EA 15 MIN 3/17/2022 Lor Tavarez PTA GP 2    70049867491 HC GAIT TRAINING EA 15 MIN 3/17/2022 Lor Tavarez PTA GP 1    00119876856 HC PT THER PROC EA 15 MIN 3/17/2022 Lor Tavarez PTA GP 1          PT G-Codes  Outcome Measure Options: AM-PAC 6 Clicks Basic Mobility (PT)  AM-PAC 6 Clicks Score (PT): 17  AM-PAC 6 Clicks Score (OT): 17    Lor Tavarez PTA  3/17/2022

## 2022-03-17 NOTE — PLAN OF CARE
Goal Outcome Evaluation:  Plan of Care Reviewed With: patient           Outcome Evaluation: sit-stand-sit sba of 1,amb 25'x2 with rw and cga of 1;sats % with activity

## 2022-03-17 NOTE — PROGRESS NOTES
"[facility]  [address] [place] [address]. [address]  T - [phone]  F - [phone]     Progress Note          PATIENT  DEMOGRAPHICS   PATIENT NAME: Yamileth Slater                      PHYSICIAN: cAe Lauren MD  : 1960  MRN: [id number]   LOS: 0 days    Patient Care Team:  Rianna Macias MD as PCP - General (Family Medicine)  Subjective   SUBJECTIVE   Breathing better. No soa       Objective   OBJECTIVE   Vital Signs  Temp:  [97 °F (36.1 °C)-97.7 °F (36.5 °C)] 97.6 °F (36.4 °C)  Heart Rate:  [66-76] 72  Resp:  [18-22] 18  BP: (123-171)/(56-72) 133/60    Flowsheet Rows    Flowsheet Row First Filed Value   Admission Height 157.5 cm (62\") Documented at 2022 1435   Admission Weight 140 kg (309 lb) Documented at 2022 1435           I/O last 3 completed shifts:  In: 240 [P.O.:240]  Out: 7950 [Urine:950; Other:7000]    PHYSICAL EXAM    Physical Exam  Vitals reviewed.   Constitutional:       Appearance: Normal appearance.   HENT:      Nose: Nose normal.   Cardiovascular:      Rate and Rhythm: Normal rate and regular rhythm.      Pulses: Normal pulses.      Heart sounds: Normal heart sounds. No murmur heard.  Pulmonary:      Effort: Pulmonary effort is normal.      Breath sounds: No wheezing or rales.   Abdominal:      General: Abdomen is flat. There is no distension.      Palpations: Abdomen is soft.      Tenderness: There is no abdominal tenderness.   Musculoskeletal:         General: No swelling.   Skin:     Coloration: Skin is not jaundiced.      Findings: No erythema.   Neurological:      General: No focal deficit present.      Mental Status: She is alert and oriented to person, place, and time.         RESULTS   Results Review:    Results from last 7 days   Lab Units 22  0545 22  0557 03/15/22  0608   SODIUM mmol/L 130* 133* 131*   POTASSIUM mmol/L 4.0 4.2 4.0   CHLORIDE mmol/L 94* 96* 95*   CO2 mmol/L 27.0 26.0 25.0   BUN mg/dL 28* 38* 58* "   CREATININE mg/dL 2.51* 3.02* 3.79*   CALCIUM mg/dL 9.1 9.0 9.0   BILIRUBIN mg/dL 0.4 0.5 0.5   ALK PHOS U/L 184* 219* 215*   ALT (SGPT) U/L 6 6 8   AST (SGOT) U/L 9 8 11   GLUCOSE mg/dL 186* 400* 78       Estimated Creatinine Clearance: 30.6 mL/min (A) (by C-G formula based on SCr of 2.51 mg/dL (H)).    Results from last 7 days   Lab Units 03/14/22  1914   MAGNESIUM mg/dL 1.9             Results from last 7 days   Lab Units 03/17/22  0545 03/16/22  0557 03/15/22  0608 03/14/22  1752   WBC 10*3/mm3 6.37 6.38 6.95 7.60   HEMOGLOBIN g/dL 8.5* 8.7* 8.4* 7.1*   PLATELETS 10*3/mm3 275 253 277 263               Imaging Results (Last 24 Hours)     ** No results found for the last 24 hours. **           MEDICATIONS    atorvastatin, 20 mg, Oral, Nightly  budesonide-formoterol, 2 puff, Inhalation, BID - RT  bumetanide, 2 mg, Oral, Once per day on Sun Tue Thu Sat  clopidogrel, 75 mg, Oral, Daily  epoetin hubert/hubert-epbx, 10,000 Units, Intravenous, Once per day on Mon Wed Fri  ferrous sulfate, 324 mg, Oral, Daily With Breakfast  gabapentin, 800 mg, Oral, TID  insulin aspart, 0-24 Units, Subcutaneous, TID AC  insulin aspart, 12 Units, Subcutaneous, TID With Meals  insulin detemir, 20 Units, Subcutaneous, Q12H  ipratropium-albuterol, 3 mL, Nebulization, 4x Daily - RT  isosorbide mononitrate, 30 mg, Oral, QAM  levothyroxine, 25 mcg, Oral, Daily  lidocaine, 1 patch, Transdermal, Q24H  lisinopril, 5 mg, Oral, Daily  metoprolol tartrate, 25 mg, Oral, Q12H  montelukast, 10 mg, Oral, Nightly  nystatin, , Topical, TID  oxybutynin XL, 5 mg, Oral, Daily  pantoprazole, 40 mg, Oral, Nightly  ranolazine, 500 mg, Oral, Q12H  sennosides-docusate, 2 tablet, Oral, BID  sevelamer, 800 mg, Oral, TID With Meals  sodium bicarbonate, 1,300 mg, Oral, Daily  sodium chloride, 10 mL, Intravenous, Q12H      O2, 3 L/min, Last Rate: Stopped (03/16/22 1917)        Assessment/Plan   ASSESSMENT / PLAN      ESRD on hemodialysis (HCC)    Chronic obstructive  pulmonary disease (HCC)    Morbid obesity with BMI of 50.0-59.9, adult (HCC)    GERD (gastroesophageal reflux disease)    Hypertension    Anemia due to chronic kidney disease, on chronic dialysis ([facility])    Type 2 diabetes mellitus with kidney complication, with long-term current use of insulin ([facility])    Physical deconditioning    1.end-stage renal disease on hemodialysis Monday Wednesday Friday via tunneled dialysis catheter.  She was not able to do her treatment Monday because of being ill.  Hd Tuesday and Wednesday here this week .plan for dc noted. Her breathing is much more comfortable. cxr better. no kcl for now. Also stop na bicarbonate    2.chest pain the troponin has been negative.  She has right-sided pleural effusion.  Her last echocardiogram showed EF of 65%.  She has a heart cath back in November 2021.    3.anemia chronic kidney disease possible blood loss - patient has received 2 unit of packed RBC so far hgb holding mid 8s    4.diabetes mellitus type 2                This document has been electronically signed by Ace Lauren MD on March 17, 2022 10:40 CDT

## 2022-03-17 NOTE — OUTREACH NOTE
Prep Survey    Flowsheet Row Responses   [place] facility patient discharged from? [place]   Is LACE score < 7 ? No   Emergency Room discharge w/ pulse ox? No   Eligibility [facility]   Date of Admission 03/14/22   Date of Discharge 03/17/22   [place] Disposition [facility]   Discharge diagnosis chest pain, A/C CHF, ESRD on HD, COPD, T2DM, anemia,    Does the patient have one of the following disease processes/diagnoses(primary or secondary)? CHF   Does the patient have Home health ordered? Yes   What is the Home health agency?  [facility]   Is there a DME ordered? No   Prep survey completed? Yes          SANTIAGO LIU - Registered Nurse

## 2022-03-18 ENCOUNTER — HOME CARE VISIT (OUTPATIENT)
Dept: HOME HEALTH SERVICES | Facility: HOME HEALTHCARE | Age: 62
End: 2022-03-18

## 2022-03-18 ENCOUNTER — TRANSITIONAL CARE MANAGEMENT TELEPHONE ENCOUNTER (OUTPATIENT)
Dept: CALL CENTER | Facility: HOSPITAL | Age: 62
End: 2022-03-18

## 2022-03-18 LAB — GLUCOSE BLDC GLUCOMTR-MCNC: 240 MG/DL (ref 70–130)

## 2022-03-18 RX ORDER — RANOLAZINE 500 MG/1
500 TABLET, EXTENDED RELEASE ORAL EVERY 12 HOURS SCHEDULED
Qty: 60 TABLET | Refills: 0 | Status: SHIPPED | OUTPATIENT
Start: 2022-03-18 | End: 2022-04-21

## 2022-03-18 NOTE — OUTREACH NOTE
Call Center TCM Note    Flowsheet Row Responses   [facility] patient discharged from? [place]   Does the patient have one of the following disease processes/diagnoses(primary or secondary)? CHF   TCM attempt successful? No   Unsuccessful attempts Attempt 2           Chelsy Weiss RN    3/18/2022, 18:27 EDT

## 2022-03-18 NOTE — CASE COMMUNICATION
Pt missed a physical therapy appt on 3/17/22 due to recent hospitalization at [facility] under observation.

## 2022-03-18 NOTE — TELEPHONE ENCOUNTER
Incoming Refill Request      Medication requested (name and dose):   [facility]    Pharmacy where request should be sent: [facility]    Additional details provided by patient: patient states she is still on these meds and needs refills    Best call back number: [phone]  Does the patient have less than a 3 day supply:  [x] Yes  [] No    Ferdinand Ramos Rep  03/18/22, 12:00 CDT           Negative

## 2022-03-18 NOTE — OUTREACH NOTE
Call Center TCM Note    Flowsheet Row Responses   [facility] patient discharged from? [place]   Does the patient have one of the following disease processes/diagnoses(primary or secondary)? CHF   TCM attempt successful? No   Unsuccessful attempts Attempt 1  [patient and spouse]           Chelsy Weiss RN    3/18/2022, 16:25 EDT

## 2022-03-19 ENCOUNTER — TRANSITIONAL CARE MANAGEMENT TELEPHONE ENCOUNTER (OUTPATIENT)
Dept: CALL CENTER | Facility: HOSPITAL | Age: 62
End: 2022-03-19

## 2022-03-19 LAB — BACTERIA SPEC AEROBE CULT: NORMAL

## 2022-03-19 NOTE — OUTREACH NOTE
Call Center TCM Note    Flowsheet Row Responses   [facility] facility patient discharged from? [place]   Does the patient have one of the following disease processes/diagnoses(primary or secondary)? CHF   TCM attempt successful? No  [Verbal release is over a year old]   Unsuccessful attempts Attempt 3           Nan Rodriguez RN    3/19/2022, 12:43 EDT

## 2022-03-24 ENCOUNTER — HOME CARE VISIT (OUTPATIENT)
Dept: HOME HEALTH SERVICES | Facility: HOME HEALTHCARE | Age: 62
End: 2022-03-24

## 2022-03-24 ENCOUNTER — OFFICE VISIT (OUTPATIENT)
Dept: FAMILY MEDICINE CLINIC | Facility: CLINIC | Age: 62
End: 2022-03-24

## 2022-03-24 ENCOUNTER — LAB (OUTPATIENT)
Dept: LAB | Facility: HOSPITAL | Age: 62
End: 2022-03-24

## 2022-03-24 VITALS
OXYGEN SATURATION: 99 % | BODY MASS INDEX: 53.92 KG/M2 | WEIGHT: 293 LBS | SYSTOLIC BLOOD PRESSURE: 140 MMHG | HEIGHT: 62 IN | TEMPERATURE: 96.4 F | DIASTOLIC BLOOD PRESSURE: 78 MMHG | HEART RATE: 85 BPM

## 2022-03-24 DIAGNOSIS — Z79.899 OTHER LONG TERM (CURRENT) DRUG THERAPY: ICD-10-CM

## 2022-03-24 DIAGNOSIS — F17.200 TOBACCO DEPENDENCE SYNDROME: ICD-10-CM

## 2022-03-24 DIAGNOSIS — E11.49 NEUROLOGIC DISORDER ASSOCIATED WITH DIABETES MELLITUS: ICD-10-CM

## 2022-03-24 DIAGNOSIS — Z09 HOSPITAL DISCHARGE FOLLOW-UP: Primary | ICD-10-CM

## 2022-03-24 PROCEDURE — G0480 DRUG TEST DEF 1-7 CLASSES: HCPCS

## 2022-03-24 PROCEDURE — 99213 OFFICE O/P EST LOW 20 MIN: CPT | Performed by: STUDENT IN AN ORGANIZED HEALTH CARE EDUCATION/TRAINING PROGRAM

## 2022-03-24 PROCEDURE — G0481 DRUG TEST DEF 8-14 CLASSES: HCPCS

## 2022-03-24 PROCEDURE — 80307 DRUG TEST PRSMV CHEM ANLYZR: CPT

## 2022-03-24 RX ORDER — GABAPENTIN 800 MG/1
800 TABLET ORAL 3 TIMES DAILY
COMMUNITY
Start: 2022-02-28 | End: 2022-03-24 | Stop reason: SDUPTHER

## 2022-03-24 RX ORDER — GABAPENTIN 800 MG/1
800 TABLET ORAL 3 TIMES DAILY
Qty: 90 TABLET | Refills: 2 | Status: SHIPPED | OUTPATIENT
Start: 2022-03-24 | End: 2022-04-19 | Stop reason: HOSPADM

## 2022-03-26 PROBLEM — Z79.899 OTHER LONG TERM (CURRENT) DRUG THERAPY: Status: ACTIVE | Noted: 2022-03-26

## 2022-03-26 PROBLEM — Z51.89: Status: ACTIVE | Noted: 2022-03-26

## 2022-03-26 NOTE — PROGRESS NOTES
Transitional Care Follow Up Visit  Subjective     Yamileth Slater is a 62 y.o. female who presents for a transitional care management visit.    Within 48 business hours after discharge our office contacted her via telephone to coordinate her care and needs.      I reviewed and discussed the details of that call along with the discharge summary, hospital problems, inpatient lab results, inpatient diagnostic studies, and consultation reports with Yamileth.     Current outpatient and discharge medications have been reconciled for the patient.  Reviewed by: Alejandra Altamirano MD      Date of TCM Phone Call 3/17/2022   [facility]   Date of Admission 3/14/2022   Date of Discharge 3/17/2022   Discharge Disposition [facility]     Risk for Readmission (LACE) Score: 13 (3/17/2022  5:00 AM)      History of Present Illness   Course During Hospital Stay:  Patient admitted from 3/14-3/17 for SOA after missing her dialysis appointments. Nephrology was consulted and she underwent 2 rounds of dialysis with symptomatic improvement. Her Hgb was found to be 7.1 and she received 2 units of PRBC. She was discharged in stable condition. Since discharge, patient reports having some generalized pain and missed her dialysis appointment yesterday. She is due for dialysis tomorrow. Also admits to picking up smoking again for the past 2 moths. Currently smoking 5-6 cigarettes a day after quitting for 9 months. Previously smoked >1 PPD.     The following portions of the patient's history were reviewed and updated as appropriate: allergies, current medications, past family history, past medical history, past social history, past surgical history and problem list.    Review of Systems   Constitutional: Negative for activity change, appetite change, fatigue and fever.   HENT: Negative for congestion, rhinorrhea, sinus pain and sore throat.    Eyes: Negative for pain, redness and visual disturbance.   Respiratory: Positive for  shortness of breath (chronic). Negative for cough and wheezing.    Cardiovascular: Negative for chest pain, palpitations and leg swelling.   Gastrointestinal: Negative for abdominal pain, constipation, diarrhea, nausea and vomiting.   Genitourinary: Negative for dysuria and flank pain.   Musculoskeletal: Negative for arthralgias, back pain, joint swelling and myalgias.   Skin: Negative for color change, pallor and rash.   Neurological: Negative for dizziness, light-headedness and headaches.       Objective   Physical Exam  Vitals and nursing note reviewed.   Constitutional:       General: She is not in acute distress.     Appearance: Normal appearance. She is well-developed. She is not diaphoretic.   HENT:      Head: Normocephalic and atraumatic.      Right Ear: Hearing normal.      Left Ear: Hearing normal.   Eyes:      General: Lids are normal.      Conjunctiva/sclera: Conjunctivae normal.   Cardiovascular:      Rate and Rhythm: Normal rate and regular rhythm.      Heart sounds: Normal heart sounds.   Pulmonary:      Effort: Pulmonary effort is normal. No respiratory distress.      Breath sounds: Decreased breath sounds (worse at base) present. No wheezing or rales.   Abdominal:      General: Bowel sounds are normal. There is no distension.      Palpations: Abdomen is soft.      Tenderness: There is no abdominal tenderness.   Musculoskeletal:         General: No tenderness or deformity. Normal range of motion.      Right lower leg: No edema.      Left lower leg: No edema.   Skin:     General: Skin is warm and dry.      Coloration: Skin is not pale.      Findings: No erythema or rash.   Neurological:      Mental Status: She is alert and oriented to person, place, and time. She is not disoriented.         Assessment/Plan   Diagnoses and all orders for this visit:    1. Hospital discharge follow-up (Primary)    2. 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  Dispense: 90 tablet; Refill: 2    3. Other long term (current) drug therapy   -     Gabapentin, Urine -; Future  -     ToxASSURE Select 13 Discrete -; Future    4. Tobacco dependence syndrome    Patient educated on importance of attending her dialysis appointments. Patient expressed understanding and will go to her appointment tomorrow. She is on a MWF schedule. Had a lengthy discussion about smoking cessation given her multiple co-morbidities. She does not desire medical intervention and states she will work on cutting down with plans of complete cessation. Will refill gabapentin. Patient to complete UDS today.            Alejandra Altamirano M.D. PGY3  [facility]  [address] [place] [address]  Office: [phone]  This document has been electronically signed by Alejandra Altamirano MD on March 26, 2022 14:05 CDT

## 2022-03-28 ENCOUNTER — APPOINTMENT (OUTPATIENT)
Dept: GENERAL RADIOLOGY | Facility: HOSPITAL | Age: 62
End: 2022-03-28

## 2022-03-28 ENCOUNTER — HOSPITAL ENCOUNTER (OUTPATIENT)
Facility: HOSPITAL | Age: 62
Setting detail: OBSERVATION
Discharge: HOME | End: 2022-03-29
Attending: STUDENT IN AN ORGANIZED HEALTH CARE EDUCATION/TRAINING PROGRAM | Admitting: FAMILY MEDICINE

## 2022-03-28 ENCOUNTER — READMISSION MANAGEMENT (OUTPATIENT)
Dept: CALL CENTER | Facility: HOSPITAL | Age: 62
End: 2022-03-28

## 2022-03-28 DIAGNOSIS — R06.02 SOB (SHORTNESS OF BREATH): Primary | ICD-10-CM

## 2022-03-28 DIAGNOSIS — Z74.09 IMPAIRED MOBILITY AND ACTIVITIES OF DAILY LIVING: ICD-10-CM

## 2022-03-28 DIAGNOSIS — D64.9 ANEMIA, UNSPECIFIED TYPE: ICD-10-CM

## 2022-03-28 DIAGNOSIS — Z78.9 IMPAIRED MOBILITY AND ACTIVITIES OF DAILY LIVING: ICD-10-CM

## 2022-03-28 DIAGNOSIS — D63.1 ANEMIA DUE TO STAGE 5 CHRONIC KIDNEY DISEASE, NOT ON CHRONIC DIALYSIS: ICD-10-CM

## 2022-03-28 DIAGNOSIS — N18.5 ANEMIA DUE TO STAGE 5 CHRONIC KIDNEY DISEASE, NOT ON CHRONIC DIALYSIS: ICD-10-CM

## 2022-03-28 PROBLEM — Z99.2 ADMISSION FOR DIALYSIS (HCC): Status: ACTIVE | Noted: 2022-03-28

## 2022-03-28 LAB
ABO GROUP BLD: NORMAL
ALBUMIN SERPL-MCNC: 3.5 G/DL (ref 3.5–5.2)
ALBUMIN/GLOB SERPL: 0.9 G/DL
ALP SERPL-CCNC: 207 U/L (ref 39–117)
ALT SERPL W P-5'-P-CCNC: 7 U/L (ref 1–33)
ANION GAP SERPL CALCULATED.3IONS-SCNC: 12 MMOL/L (ref 5–15)
AST SERPL-CCNC: 11 U/L (ref 1–32)
BASOPHILS # BLD AUTO: 0.05 10*3/MM3 (ref 0–0.2)
BASOPHILS NFR BLD AUTO: 0.6 % (ref 0–1.5)
BILIRUB SERPL-MCNC: 0.2 MG/DL (ref 0–1.2)
BLD GP AB SCN SERPL QL: NEGATIVE
BUN SERPL-MCNC: 60 MG/DL (ref 8–23)
BUN/CREAT SERPL: 16.3 (ref 7–25)
CALCIUM SPEC-SCNC: 8.7 MG/DL (ref 8.6–10.5)
CHLORIDE SERPL-SCNC: 99 MMOL/L (ref 98–107)
CO2 SERPL-SCNC: 24 MMOL/L (ref 22–29)
CREAT SERPL-MCNC: 3.69 MG/DL (ref 0.57–1)
DEPRECATED RDW RBC AUTO: 53.7 FL (ref 37–54)
EGFRCR SERPLBLD CKD-EPI 2021: 13.2 ML/MIN/1.73
EOSINOPHIL # BLD AUTO: 0.21 10*3/MM3 (ref 0–0.4)
EOSINOPHIL NFR BLD AUTO: 2.4 % (ref 0.3–6.2)
ERYTHROCYTE [DISTWIDTH] IN BLOOD BY AUTOMATED COUNT: 16.7 % (ref 12.3–15.4)
FLUAV SUBTYP SPEC NAA+PROBE: NOT DETECTED
FLUBV RNA ISLT QL NAA+PROBE: NOT DETECTED
GLOBULIN UR ELPH-MCNC: 4.1 GM/DL
GLUCOSE BLDC GLUCOMTR-MCNC: 60 MG/DL (ref 70–130)
GLUCOSE BLDC GLUCOMTR-MCNC: 62 MG/DL (ref 70–130)
GLUCOSE SERPL-MCNC: 129 MG/DL (ref 65–99)
HCT VFR BLD AUTO: 24 % (ref 34–46.6)
HGB BLD-MCNC: 7.2 G/DL (ref 12–15.9)
HOLD SPECIMEN: NORMAL
HOLD SPECIMEN: NORMAL
IMM GRANULOCYTES # BLD AUTO: 0.07 10*3/MM3 (ref 0–0.05)
IMM GRANULOCYTES NFR BLD AUTO: 0.8 % (ref 0–0.5)
LYMPHOCYTES # BLD AUTO: 1.81 10*3/MM3 (ref 0.7–3.1)
LYMPHOCYTES NFR BLD AUTO: 21 % (ref 19.6–45.3)
Lab: NORMAL
MCH RBC QN AUTO: 27.4 PG (ref 26.6–33)
MCHC RBC AUTO-ENTMCNC: 30 G/DL (ref 31.5–35.7)
MCV RBC AUTO: 91.3 FL (ref 79–97)
MONOCYTES # BLD AUTO: 0.37 10*3/MM3 (ref 0.1–0.9)
MONOCYTES NFR BLD AUTO: 4.3 % (ref 5–12)
NEUTROPHILS NFR BLD AUTO: 6.09 10*3/MM3 (ref 1.7–7)
NEUTROPHILS NFR BLD AUTO: 70.9 % (ref 42.7–76)
NRBC BLD AUTO-RTO: 0.2 /100 WBC (ref 0–0.2)
NT-PROBNP SERPL-MCNC: 5421 PG/ML (ref 0–900)
PLATELET # BLD AUTO: 246 10*3/MM3 (ref 140–450)
PMV BLD AUTO: 8.9 FL (ref 6–12)
POTASSIUM SERPL-SCNC: 4.9 MMOL/L (ref 3.5–5.2)
PROT SERPL-MCNC: 7.6 G/DL (ref 6–8.5)
RBC # BLD AUTO: 2.63 10*6/MM3 (ref 3.77–5.28)
RH BLD: POSITIVE
SARS-COV-2 RNA PNL SPEC NAA+PROBE: NOT DETECTED
SODIUM SERPL-SCNC: 135 MMOL/L (ref 136–145)
T&S EXPIRATION DATE: NORMAL
TROPONIN T SERPL-MCNC: 0.03 NG/ML (ref 0–0.03)
WBC NRBC COR # BLD: 8.6 10*3/MM3 (ref 3.4–10.8)
WHOLE BLOOD HOLD SPECIMEN: NORMAL
WHOLE BLOOD HOLD SPECIMEN: NORMAL

## 2022-03-28 PROCEDURE — 25010000002 HEPARIN (PORCINE) PER 1000 UNITS: Performed by: INTERNAL MEDICINE

## 2022-03-28 PROCEDURE — 99220 PR INITIAL OBSERVATION CARE/DAY 70 MINUTES: CPT | Performed by: STUDENT IN AN ORGANIZED HEALTH CARE EDUCATION/TRAINING PROGRAM

## 2022-03-28 PROCEDURE — 99285 EMERGENCY DEPT VISIT HI MDM: CPT

## 2022-03-28 PROCEDURE — 85025 COMPLETE CBC W/AUTO DIFF WBC: CPT | Performed by: STUDENT IN AN ORGANIZED HEALTH CARE EDUCATION/TRAINING PROGRAM

## 2022-03-28 PROCEDURE — G0378 HOSPITAL OBSERVATION PER HR: HCPCS

## 2022-03-28 PROCEDURE — 86900 BLOOD TYPING SEROLOGIC ABO: CPT | Performed by: STUDENT IN AN ORGANIZED HEALTH CARE EDUCATION/TRAINING PROGRAM

## 2022-03-28 PROCEDURE — 83880 ASSAY OF NATRIURETIC PEPTIDE: CPT | Performed by: STUDENT IN AN ORGANIZED HEALTH CARE EDUCATION/TRAINING PROGRAM

## 2022-03-28 PROCEDURE — 84484 ASSAY OF TROPONIN QUANT: CPT | Performed by: STUDENT IN AN ORGANIZED HEALTH CARE EDUCATION/TRAINING PROGRAM

## 2022-03-28 PROCEDURE — 86923 COMPATIBILITY TEST ELECTRIC: CPT

## 2022-03-28 PROCEDURE — 71046 X-RAY EXAM CHEST 2 VIEWS: CPT

## 2022-03-28 PROCEDURE — 87636 SARSCOV2 & INF A&B AMP PRB: CPT | Performed by: STUDENT IN AN ORGANIZED HEALTH CARE EDUCATION/TRAINING PROGRAM

## 2022-03-28 PROCEDURE — G0257 UNSCHED DIALYSIS ESRD PT HOS: HCPCS

## 2022-03-28 PROCEDURE — C9803 HOPD COVID-19 SPEC COLLECT: HCPCS

## 2022-03-28 PROCEDURE — 86900 BLOOD TYPING SEROLOGIC ABO: CPT

## 2022-03-28 PROCEDURE — 82962 GLUCOSE BLOOD TEST: CPT

## 2022-03-28 PROCEDURE — 93005 ELECTROCARDIOGRAM TRACING: CPT

## 2022-03-28 PROCEDURE — 86850 RBC ANTIBODY SCREEN: CPT | Performed by: STUDENT IN AN ORGANIZED HEALTH CARE EDUCATION/TRAINING PROGRAM

## 2022-03-28 PROCEDURE — 93010 ELECTROCARDIOGRAM REPORT: CPT | Performed by: INTERNAL MEDICINE

## 2022-03-28 PROCEDURE — 93005 ELECTROCARDIOGRAM TRACING: CPT | Performed by: STUDENT IN AN ORGANIZED HEALTH CARE EDUCATION/TRAINING PROGRAM

## 2022-03-28 PROCEDURE — 86901 BLOOD TYPING SEROLOGIC RH(D): CPT | Performed by: STUDENT IN AN ORGANIZED HEALTH CARE EDUCATION/TRAINING PROGRAM

## 2022-03-28 PROCEDURE — P9016 RBC LEUKOCYTES REDUCED: HCPCS

## 2022-03-28 PROCEDURE — 80053 COMPREHEN METABOLIC PANEL: CPT | Performed by: STUDENT IN AN ORGANIZED HEALTH CARE EDUCATION/TRAINING PROGRAM

## 2022-03-28 RX ORDER — BISACODYL 10 MG
10 SUPPOSITORY, RECTAL RECTAL DAILY PRN
Status: DISCONTINUED | OUTPATIENT
Start: 2022-03-28 | End: 2022-03-29 | Stop reason: HOSPADM

## 2022-03-28 RX ORDER — HEPARIN SODIUM 1000 [USP'U]/ML
4000 INJECTION, SOLUTION INTRAVENOUS; SUBCUTANEOUS AS NEEDED
Status: DISCONTINUED | OUTPATIENT
Start: 2022-03-28 | End: 2022-03-29 | Stop reason: HOSPADM

## 2022-03-28 RX ORDER — CLOPIDOGREL BISULFATE 75 MG/1
75 TABLET ORAL DAILY
Status: DISCONTINUED | OUTPATIENT
Start: 2022-03-29 | End: 2022-03-29 | Stop reason: HOSPADM

## 2022-03-28 RX ORDER — ACETAMINOPHEN 325 MG/1
650 TABLET ORAL EVERY 4 HOURS PRN
Status: DISCONTINUED | OUTPATIENT
Start: 2022-03-28 | End: 2022-03-29 | Stop reason: HOSPADM

## 2022-03-28 RX ORDER — HEPARIN SODIUM 1000 [USP'U]/ML
4000 INJECTION, SOLUTION INTRAVENOUS; SUBCUTANEOUS AS NEEDED
Status: DISCONTINUED | OUTPATIENT
Start: 2022-03-29 | End: 2022-03-29 | Stop reason: HOSPADM

## 2022-03-28 RX ORDER — PANTOPRAZOLE SODIUM 40 MG/1
40 TABLET, DELAYED RELEASE ORAL NIGHTLY
Status: DISCONTINUED | OUTPATIENT
Start: 2022-03-28 | End: 2022-03-29 | Stop reason: HOSPADM

## 2022-03-28 RX ORDER — ATORVASTATIN CALCIUM 20 MG/1
20 TABLET, FILM COATED ORAL DAILY
Status: DISCONTINUED | OUTPATIENT
Start: 2022-03-28 | End: 2022-03-29 | Stop reason: HOSPADM

## 2022-03-28 RX ORDER — SODIUM CHLORIDE 0.9 % (FLUSH) 0.9 %
10 SYRINGE (ML) INJECTION AS NEEDED
Status: DISCONTINUED | OUTPATIENT
Start: 2022-03-28 | End: 2022-03-29 | Stop reason: HOSPADM

## 2022-03-28 RX ORDER — SODIUM CHLORIDE 9 MG/ML
250 INJECTION, SOLUTION INTRAVENOUS AS NEEDED
Status: DISCONTINUED | OUTPATIENT
Start: 2022-03-28 | End: 2022-03-29 | Stop reason: HOSPADM

## 2022-03-28 RX ORDER — NICOTINE POLACRILEX 4 MG
15 LOZENGE BUCCAL
Status: DISCONTINUED | OUTPATIENT
Start: 2022-03-28 | End: 2022-03-29 | Stop reason: HOSPADM

## 2022-03-28 RX ORDER — DEXTROSE MONOHYDRATE 25 G/50ML
25 INJECTION, SOLUTION INTRAVENOUS
Status: DISCONTINUED | OUTPATIENT
Start: 2022-03-28 | End: 2022-03-29 | Stop reason: HOSPADM

## 2022-03-28 RX ORDER — ONDANSETRON 2 MG/ML
4 INJECTION INTRAMUSCULAR; INTRAVENOUS EVERY 6 HOURS PRN
Status: DISCONTINUED | OUTPATIENT
Start: 2022-03-28 | End: 2022-03-29 | Stop reason: HOSPADM

## 2022-03-28 RX ORDER — POLYETHYLENE GLYCOL 3350 17 G/17G
17 POWDER, FOR SOLUTION ORAL DAILY PRN
Status: DISCONTINUED | OUTPATIENT
Start: 2022-03-28 | End: 2022-03-29 | Stop reason: HOSPADM

## 2022-03-28 RX ORDER — ONDANSETRON 4 MG/1
4 TABLET, FILM COATED ORAL EVERY 6 HOURS PRN
Status: DISCONTINUED | OUTPATIENT
Start: 2022-03-28 | End: 2022-03-29 | Stop reason: HOSPADM

## 2022-03-28 RX ORDER — ASPIRIN 81 MG/1
81 TABLET ORAL DAILY
Status: DISCONTINUED | OUTPATIENT
Start: 2022-03-29 | End: 2022-03-29 | Stop reason: HOSPADM

## 2022-03-28 RX ORDER — ISOSORBIDE MONONITRATE 30 MG/1
30 TABLET, EXTENDED RELEASE ORAL EVERY MORNING
Status: DISCONTINUED | OUTPATIENT
Start: 2022-03-29 | End: 2022-03-29 | Stop reason: HOSPADM

## 2022-03-28 RX ORDER — SEVELAMER CARBONATE 800 MG/1
800 TABLET, FILM COATED ORAL
Status: DISCONTINUED | OUTPATIENT
Start: 2022-03-28 | End: 2022-03-29 | Stop reason: HOSPADM

## 2022-03-28 RX ORDER — CALCIUM CARBONATE 200(500)MG
2 TABLET,CHEWABLE ORAL 2 TIMES DAILY PRN
Status: DISCONTINUED | OUTPATIENT
Start: 2022-03-28 | End: 2022-03-29 | Stop reason: HOSPADM

## 2022-03-28 RX ORDER — BISACODYL 5 MG/1
5 TABLET, DELAYED RELEASE ORAL DAILY PRN
Status: DISCONTINUED | OUTPATIENT
Start: 2022-03-28 | End: 2022-03-29 | Stop reason: HOSPADM

## 2022-03-28 RX ORDER — BUMETANIDE 1 MG/1
2 TABLET ORAL
Status: DISCONTINUED | OUTPATIENT
Start: 2022-03-29 | End: 2022-03-29 | Stop reason: HOSPADM

## 2022-03-28 RX ORDER — LISINOPRIL 5 MG/1
5 TABLET ORAL DAILY
Status: DISCONTINUED | OUTPATIENT
Start: 2022-03-29 | End: 2022-03-29 | Stop reason: HOSPADM

## 2022-03-28 RX ORDER — SODIUM CHLORIDE 0.9 % (FLUSH) 0.9 %
10 SYRINGE (ML) INJECTION EVERY 12 HOURS SCHEDULED
Status: DISCONTINUED | OUTPATIENT
Start: 2022-03-28 | End: 2022-03-29 | Stop reason: HOSPADM

## 2022-03-28 RX ORDER — LEVOTHYROXINE SODIUM 0.03 MG/1
25 TABLET ORAL DAILY
Status: DISCONTINUED | OUTPATIENT
Start: 2022-03-29 | End: 2022-03-29 | Stop reason: HOSPADM

## 2022-03-28 RX ORDER — GABAPENTIN 400 MG/1
800 CAPSULE ORAL EVERY 8 HOURS SCHEDULED
Status: DISCONTINUED | OUTPATIENT
Start: 2022-03-28 | End: 2022-03-29 | Stop reason: HOSPADM

## 2022-03-28 RX ORDER — AMOXICILLIN 250 MG
2 CAPSULE ORAL 2 TIMES DAILY
Status: DISCONTINUED | OUTPATIENT
Start: 2022-03-28 | End: 2022-03-29 | Stop reason: HOSPADM

## 2022-03-28 RX ORDER — OXYBUTYNIN CHLORIDE 5 MG/1
5 TABLET, EXTENDED RELEASE ORAL DAILY
Status: DISCONTINUED | OUTPATIENT
Start: 2022-03-29 | End: 2022-03-29 | Stop reason: HOSPADM

## 2022-03-28 RX ORDER — RANOLAZINE 500 MG/1
500 TABLET, EXTENDED RELEASE ORAL EVERY 12 HOURS SCHEDULED
Status: DISCONTINUED | OUTPATIENT
Start: 2022-03-28 | End: 2022-03-29 | Stop reason: HOSPADM

## 2022-03-28 RX ADMIN — METOPROLOL TARTRATE 25 MG: 25 TABLET, FILM COATED ORAL at 21:12

## 2022-03-28 RX ADMIN — GABAPENTIN 800 MG: 400 CAPSULE ORAL at 21:11

## 2022-03-28 RX ADMIN — PANTOPRAZOLE SODIUM 40 MG: 40 TABLET, DELAYED RELEASE ORAL at 21:12

## 2022-03-28 RX ADMIN — Medication 10 ML: at 21:12

## 2022-03-28 RX ADMIN — RANOLAZINE 500 MG: 500 TABLET, FILM COATED, EXTENDED RELEASE ORAL at 21:11

## 2022-03-28 RX ADMIN — ATORVASTATIN CALCIUM 20 MG: 20 TABLET, FILM COATED ORAL at 21:11

## 2022-03-28 RX ADMIN — HEPARIN SODIUM 4000 UNITS: 1000 INJECTION, SOLUTION INTRAVENOUS; SUBCUTANEOUS at 18:38

## 2022-03-28 RX ADMIN — SEVELAMER CARBONATE 800 MG: 800 TABLET, FILM COATED ORAL at 21:10

## 2022-03-28 NOTE — PROGRESS NOTES
I have reviewed the notes, assessments, and/or procedures performed by Dr. Altamirano, I concur with her/his documentation and assessment and plan for Yamileth Slater.                This document has been electronically signed by Blake Burris MD on March 28, 2022 10:15 CDT

## 2022-03-28 NOTE — OUTREACH NOTE
CHF Week 2 Survey    Flowsheet Row Responses   [facility] facility patient discharged from? [place]   Does the patient have one of the following disease processes/diagnoses(primary or secondary)? CHF   Week 2 attempt successful? No   Unsuccessful attempts Attempt 1   Discharge diagnosis chest pain, A/C CHF, ESRD on HD, COPD, T2DM, anemia,           LIZZETTE S - Registered Nurse

## 2022-03-29 ENCOUNTER — READMISSION MANAGEMENT (OUTPATIENT)
Dept: CALL CENTER | Facility: HOSPITAL | Age: 62
End: 2022-03-29

## 2022-03-29 ENCOUNTER — HOME CARE VISIT (OUTPATIENT)
Dept: HOME HEALTH SERVICES | Facility: HOME HEALTHCARE | Age: 62
End: 2022-03-29

## 2022-03-29 VITALS
DIASTOLIC BLOOD PRESSURE: 70 MMHG | TEMPERATURE: 97.1 F | HEART RATE: 76 BPM | BODY MASS INDEX: 53.92 KG/M2 | SYSTOLIC BLOOD PRESSURE: 150 MMHG | HEIGHT: 62 IN | OXYGEN SATURATION: 97 % | RESPIRATION RATE: 18 BRPM | WEIGHT: 293 LBS

## 2022-03-29 LAB
ALBUMIN SERPL-MCNC: 3.3 G/DL (ref 3.5–5.2)
ALBUMIN/GLOB SERPL: 0.8 G/DL
ALP SERPL-CCNC: 184 U/L (ref 39–117)
ALT SERPL W P-5'-P-CCNC: 7 U/L (ref 1–33)
ANION GAP SERPL CALCULATED.3IONS-SCNC: 12 MMOL/L (ref 5–15)
AST SERPL-CCNC: 10 U/L (ref 1–32)
BASOPHILS # BLD AUTO: 0.08 10*3/MM3 (ref 0–0.2)
BASOPHILS NFR BLD AUTO: 0.8 % (ref 0–1.5)
BILIRUB SERPL-MCNC: 0.4 MG/DL (ref 0–1.2)
BUN SERPL-MCNC: 38 MG/DL (ref 8–23)
BUN/CREAT SERPL: 13.3 (ref 7–25)
CALCIUM SPEC-SCNC: 8.4 MG/DL (ref 8.6–10.5)
CHLORIDE SERPL-SCNC: 97 MMOL/L (ref 98–107)
CO2 SERPL-SCNC: 23 MMOL/L (ref 22–29)
CREAT SERPL-MCNC: 2.85 MG/DL (ref 0.57–1)
DEPRECATED RDW RBC AUTO: 52.1 FL (ref 37–54)
EGFRCR SERPLBLD CKD-EPI 2021: 18.1 ML/MIN/1.73
EOSINOPHIL # BLD AUTO: 0.28 10*3/MM3 (ref 0–0.4)
EOSINOPHIL NFR BLD AUTO: 3 % (ref 0.3–6.2)
ERYTHROCYTE [DISTWIDTH] IN BLOOD BY AUTOMATED COUNT: 16.3 % (ref 12.3–15.4)
GABAPENTIN UR-MCNC: 431.3 UG/ML
GLOBULIN UR ELPH-MCNC: 4.2 GM/DL
GLUCOSE BLDC GLUCOMTR-MCNC: 157 MG/DL (ref 70–130)
GLUCOSE BLDC GLUCOMTR-MCNC: 209 MG/DL (ref 70–130)
GLUCOSE BLDC GLUCOMTR-MCNC: 211 MG/DL (ref 70–130)
GLUCOSE SERPL-MCNC: 192 MG/DL (ref 65–99)
HCT VFR BLD AUTO: 26.6 % (ref 34–46.6)
HGB BLD-MCNC: 8.3 G/DL (ref 12–15.9)
IMM GRANULOCYTES # BLD AUTO: 0.07 10*3/MM3 (ref 0–0.05)
IMM GRANULOCYTES NFR BLD AUTO: 0.7 % (ref 0–0.5)
LYMPHOCYTES # BLD AUTO: 1.51 10*3/MM3 (ref 0.7–3.1)
LYMPHOCYTES NFR BLD AUTO: 16 % (ref 19.6–45.3)
MCH RBC QN AUTO: 28.4 PG (ref 26.6–33)
MCHC RBC AUTO-ENTMCNC: 31.2 G/DL (ref 31.5–35.7)
MCV RBC AUTO: 91.1 FL (ref 79–97)
MONOCYTES # BLD AUTO: 0.39 10*3/MM3 (ref 0.1–0.9)
MONOCYTES NFR BLD AUTO: 4.1 % (ref 5–12)
NEUTROPHILS NFR BLD AUTO: 7.12 10*3/MM3 (ref 1.7–7)
NEUTROPHILS NFR BLD AUTO: 75.4 % (ref 42.7–76)
NRBC BLD AUTO-RTO: 0 /100 WBC (ref 0–0.2)
PLATELET # BLD AUTO: 267 10*3/MM3 (ref 140–450)
PMV BLD AUTO: 8.9 FL (ref 6–12)
POTASSIUM SERPL-SCNC: 4.5 MMOL/L (ref 3.5–5.2)
PROT SERPL-MCNC: 7.5 G/DL (ref 6–8.5)
RBC # BLD AUTO: 2.92 10*6/MM3 (ref 3.77–5.28)
SODIUM SERPL-SCNC: 132 MMOL/L (ref 136–145)
WBC NRBC COR # BLD: 9.45 10*3/MM3 (ref 3.4–10.8)

## 2022-03-29 PROCEDURE — G0378 HOSPITAL OBSERVATION PER HR: HCPCS

## 2022-03-29 PROCEDURE — 99217 PR OBSERVATION CARE DISCHARGE MANAGEMENT: CPT | Performed by: STUDENT IN AN ORGANIZED HEALTH CARE EDUCATION/TRAINING PROGRAM

## 2022-03-29 PROCEDURE — 63710000001 INSULIN ASPART PER 5 UNITS: Performed by: STUDENT IN AN ORGANIZED HEALTH CARE EDUCATION/TRAINING PROGRAM

## 2022-03-29 PROCEDURE — 25010000002 HEPARIN (PORCINE) PER 1000 UNITS: Performed by: INTERNAL MEDICINE

## 2022-03-29 PROCEDURE — G0257 UNSCHED DIALYSIS ESRD PT HOS: HCPCS

## 2022-03-29 PROCEDURE — 82962 GLUCOSE BLOOD TEST: CPT

## 2022-03-29 PROCEDURE — 80053 COMPREHEN METABOLIC PANEL: CPT | Performed by: STUDENT IN AN ORGANIZED HEALTH CARE EDUCATION/TRAINING PROGRAM

## 2022-03-29 PROCEDURE — 85025 COMPLETE CBC W/AUTO DIFF WBC: CPT | Performed by: STUDENT IN AN ORGANIZED HEALTH CARE EDUCATION/TRAINING PROGRAM

## 2022-03-29 PROCEDURE — 97166 OT EVAL MOD COMPLEX 45 MIN: CPT

## 2022-03-29 RX ORDER — IPRATROPIUM BROMIDE AND ALBUTEROL SULFATE 2.5; .5 MG/3ML; MG/3ML
3 SOLUTION RESPIRATORY (INHALATION) EVERY 6 HOURS PRN
Status: DISCONTINUED | OUTPATIENT
Start: 2022-03-29 | End: 2022-03-29 | Stop reason: HOSPADM

## 2022-03-29 RX ORDER — MONTELUKAST SODIUM 10 MG/1
10 TABLET ORAL NIGHTLY
Status: DISCONTINUED | OUTPATIENT
Start: 2022-03-29 | End: 2022-03-29 | Stop reason: HOSPADM

## 2022-03-29 RX ADMIN — INSULIN ASPART 3 UNITS: 100 INJECTION, SOLUTION INTRAVENOUS; SUBCUTANEOUS at 08:06

## 2022-03-29 RX ADMIN — ASPIRIN 81 MG: 81 TABLET, FILM COATED ORAL at 14:00

## 2022-03-29 RX ADMIN — CLOPIDOGREL BISULFATE 75 MG: 75 TABLET ORAL at 13:20

## 2022-03-29 RX ADMIN — SEVELAMER CARBONATE 800 MG: 800 TABLET, FILM COATED ORAL at 13:20

## 2022-03-29 RX ADMIN — INSULIN ASPART 12 UNITS: 100 INJECTION, SOLUTION INTRAVENOUS; SUBCUTANEOUS at 08:07

## 2022-03-29 RX ADMIN — LISINOPRIL 5 MG: 5 TABLET ORAL at 13:20

## 2022-03-29 RX ADMIN — HEPARIN SODIUM 4000 UNITS: 1000 INJECTION, SOLUTION INTRAVENOUS; SUBCUTANEOUS at 08:58

## 2022-03-29 RX ADMIN — DOCUSATE SODIUM 50 MG AND SENNOSIDES 8.6 MG 2 TABLET: 8.6; 5 TABLET, FILM COATED ORAL at 13:20

## 2022-03-29 RX ADMIN — METOPROLOL TARTRATE 25 MG: 25 TABLET, FILM COATED ORAL at 13:20

## 2022-03-29 RX ADMIN — GABAPENTIN 800 MG: 400 CAPSULE ORAL at 06:13

## 2022-03-29 RX ADMIN — GABAPENTIN 800 MG: 400 CAPSULE ORAL at 13:20

## 2022-03-29 RX ADMIN — OXYBUTYNIN CHLORIDE 5 MG: 5 TABLET, EXTENDED RELEASE ORAL at 13:20

## 2022-03-29 RX ADMIN — RANOLAZINE 500 MG: 500 TABLET, FILM COATED, EXTENDED RELEASE ORAL at 13:20

## 2022-03-29 RX ADMIN — ISOSORBIDE MONONITRATE 30 MG: 30 TABLET, EXTENDED RELEASE ORAL at 06:13

## 2022-03-29 RX ADMIN — BUMETANIDE 2 MG: 1 TABLET ORAL at 13:20

## 2022-03-29 RX ADMIN — INSULIN ASPART 3 UNITS: 100 INJECTION, SOLUTION INTRAVENOUS; SUBCUTANEOUS at 12:00

## 2022-03-29 RX ADMIN — INSULIN ASPART 12 UNITS: 100 INJECTION, SOLUTION INTRAVENOUS; SUBCUTANEOUS at 12:00

## 2022-03-29 NOTE — OUTREACH NOTE
CHF Week 2 Survey    Flowsheet Row Responses   [facility] facility patient discharged from? [place]   Does the patient have one of the following disease processes/diagnoses(primary or secondary)? CHF   Week 2 attempt successful? No   Revoke Readmitted          BETO CLEMENTS - Registered Nurse

## 2022-03-30 ENCOUNTER — TRANSITIONAL CARE MANAGEMENT TELEPHONE ENCOUNTER (OUTPATIENT)
Dept: CALL CENTER | Facility: HOSPITAL | Age: 62
End: 2022-03-30

## 2022-03-30 LAB
BH BB BLOOD EXPIRATION DATE: NORMAL
BH BB BLOOD TYPE BARCODE: NORMAL
BH BB DISPENSE STATUS: NORMAL
BH BB PRODUCT CODE: NORMAL
BH BB UNIT NUMBER: NORMAL
CROSSMATCH INTERPRETATION: NORMAL
UNIT  ABO: NORMAL
UNIT  RH: NORMAL

## 2022-03-30 NOTE — OUTREACH NOTE
Prep Survey    Flowsheet Row Responses   [facility] facility patient discharged from? [place]   Is LACE score < 7 ? No   Emergency Room discharge w/ pulse ox? No   Eligibility [facility]   Date of Admission 03/28/22   Date of Discharge 03/29/22   Discharge Disposition Home or Self Care   Discharge diagnosis Admission for dialysis    Does the patient have one of the following disease processes/diagnoses(primary or secondary)? Other   Does the patient have Home health ordered? No   Is there a DME ordered? No   Prep survey completed? Yes          BIRGIT LEE - Registered Nurse

## 2022-03-30 NOTE — OUTREACH NOTE
Call Center TCM Note    Flowsheet Row Responses   [facility] patient discharged from? [place]   Does the patient have one of the following disease processes/diagnoses(primary or secondary)? Other   TCM attempt successful? Yes  [no verbal release. ]   Call start time 1418   Call end time 1423   Discharge diagnosis Admission for dialysis    Is the patient taking all medications as directed (includes completed medication regime)? Yes   Comments regarding appointments At her dialysis appt currently    Does the patient have a primary care provider?  Yes   Does the patient have an appointment with their PCP within 7 days of discharge? Yes   Comments regarding PCP HOSP DC FU appt 4/5/22 @ 2:30 pm.    Has the patient kept scheduled appointments due by today? Yes   What is the Home health agency?  [facility]   Has home health visited the patient within 72 hours of discharge? Unsure   What DME was ordered? O[employer] and [employer] at home   Did the patient receive a copy of their discharge instructions? Yes   Nursing interventions Reviewed instructions with patient   What is the patient's perception of their health status since discharge? Same  [At dialysis now. States she always feels bad after dialysis however will be better by the next day. ]   TCM call completed? Yes   Wrap up additional comments pt reports that she is at dialsyis at this time. States she feels the same as she always feels bad after treatment. Reminded Pt of appt with Dr. Altamirano. Call then ended.           Marivel Barrera RN    3/30/2022, 14:24 EDT

## 2022-03-31 ENCOUNTER — HOME CARE VISIT (OUTPATIENT)
Dept: HOME HEALTH SERVICES | Facility: CLINIC | Age: 62
End: 2022-03-31

## 2022-03-31 PROCEDURE — G0151 HHCP-SERV OF PT,EA 15 MIN: HCPCS

## 2022-04-01 LAB
6MAM UR QL CFM: NEGATIVE
6MAM/CREAT UR: NOT DETECTED NG/MG CREAT
7AMINOCLONAZEPAM/CREAT UR: NOT DETECTED NG/MG CREAT
A-OH ALPRAZ/CREAT UR: NOT DETECTED NG/MG CREAT
A-OH-TRIAZOLAM/CREAT UR CFM: NOT DETECTED NG/MG CREAT
ALFENTANIL/CREAT UR CFM: NOT DETECTED NG/MG CREAT
ALPHA-HYDROXYMIDAZOLAM, URINE: NOT DETECTED NG/MG CREAT
ALPRAZ/CREAT UR CFM: NOT DETECTED NG/MG CREAT
AMOBARBITAL UR QL CFM: NOT DETECTED
AMPHET/CREAT UR: NOT DETECTED NG/MG CREAT
AMPHETAMINES UR QL CFM: NEGATIVE
BARBITAL UR QL CFM: NOT DETECTED
BARBITURATES UR QL CFM: NEGATIVE
BENZODIAZ UR QL CFM: NEGATIVE
BH BB BLOOD EXPIRATION DATE: NORMAL
BH BB BLOOD EXPIRATION DATE: NORMAL
BH BB BLOOD TYPE BARCODE: NORMAL
BH BB BLOOD TYPE BARCODE: NORMAL
BH BB DISPENSE STATUS: NORMAL
BH BB DISPENSE STATUS: NORMAL
BH BB PRODUCT CODE: NORMAL
BH BB PRODUCT CODE: NORMAL
BH BB UNIT NUMBER: NORMAL
BH BB UNIT NUMBER: NORMAL
BUPRENORPHINE UR QL CFM: NEGATIVE
BUPRENORPHINE/CREAT UR: NOT DETECTED NG/MG CREAT
BUTABARBITAL UR QL CFM: NOT DETECTED
BUTALBITAL UR QL CFM: NOT DETECTED
BZE/CREAT UR: NOT DETECTED NG/MG CREAT
CANNABINOIDS UR QL CFM: NEGATIVE
CARBOXYTHC/CREAT UR: NOT DETECTED NG/MG CREAT
CLONAZEPAM/CREAT UR CFM: NOT DETECTED NG/MG CREAT
COCAETHYLENE/CREAT UR CFM: NOT DETECTED NG/MG CREAT
COCAINE UR QL CFM: NEGATIVE
COCAINE/CREAT UR CFM: NOT DETECTED NG/MG CREAT
CODEINE/CREAT UR: NOT DETECTED NG/MG CREAT
CREAT UR-MCNC: 35 MG/DL
CROSSMATCH INTERPRETATION: NORMAL
CROSSMATCH INTERPRETATION: NORMAL
DESALKYLFLURAZ/CREAT UR: NOT DETECTED NG/MG CREAT
DESMETHYLFLUNITRAZEPAM: NOT DETECTED NG/MG CREAT
DHC/CREAT UR: NOT DETECTED NG/MG CREAT
DIAZEPAM/CREAT UR: NOT DETECTED NG/MG CREAT
DRUGS UR: NORMAL
EDDP/CREAT UR: NOT DETECTED NG/MG CREAT
ETHANOL UR CFM-MCNC: NOT DETECTED G/DL
ETHANOL UR QL CFM: NEGATIVE
FENTANYL UR QL CFM: NEGATIVE
FENTANYL/CREAT UR: NOT DETECTED NG/MG CREAT
FLUNITRAZEPAM UR QL CFM: NOT DETECTED NG/MG CREAT
HYDROCODONE/CREAT UR: NOT DETECTED NG/MG CREAT
HYDROMORPHONE/CREAT UR: NOT DETECTED NG/MG CREAT
LEVEL OF DETECTION:: NORMAL
LORAZEPAM/CREAT UR: NOT DETECTED NG/MG CREAT
MDA/CREAT UR: NOT DETECTED NG/MG CREAT
MDMA/CREAT UR: NOT DETECTED NG/MG CREAT
MEPHOBARBITAL UR QL CFM: NOT DETECTED
METHADONE UR QL CFM: NEGATIVE
METHADONE/CREAT UR: NOT DETECTED NG/MG CREAT
METHAMPHET/CREAT UR: NOT DETECTED NG/MG CREAT
MIDAZOLAM/CREAT UR CFM: NOT DETECTED NG/MG CREAT
MORPHINE/CREAT UR: NOT DETECTED NG/MG CREAT
N-NORTRAMADOL/CREAT UR CFM: NOT DETECTED NG/MG CREAT
NARCOTICS UR: NEGATIVE
NORBUPRENORPHINE/CREAT UR: NOT DETECTED NG/MG CREAT
NORCODEINE/CREAT UR CFM: NOT DETECTED NG/MG CREAT
NORDIAZEPAM/CREAT UR: NOT DETECTED NG/MG CREAT
NORFENTANYL/CREAT UR: NOT DETECTED NG/MG CREAT
NORHYDROCODONE/CREAT UR: NOT DETECTED NG/MG CREAT
NORMORPHINE UR-MCNC: NOT DETECTED NG/MG CREAT
NOROXYCODONE/CREAT UR: NOT DETECTED NG/MG CREAT
NOROXYMORPHONE/CREAT UR CFM: NOT DETECTED NG/MG CREAT
O-NORTRAMADOL UR CFM-MCNC: NOT DETECTED NG/MG CREAT
OPIATES UR QL CFM: NEGATIVE
OXAZEPAM/CREAT UR: NOT DETECTED NG/MG CREAT
OXYCODONE UR QL CFM: NEGATIVE
OXYCODONE/CREAT UR: NOT DETECTED NG/MG CREAT
OXYMORPHONE/CREAT UR: NOT DETECTED NG/MG CREAT
PENTOBARB UR QL CFM: NOT DETECTED
PHENOBARB UR QL CFM: NOT DETECTED
SECOBARBITAL UR QL CFM: NOT DETECTED
SUFENTANIL/CREAT UR CFM: NOT DETECTED NG/MG CREAT
TAPENTADOL UR QL CFM: NEGATIVE
TAPENTADOL/CREAT UR: NOT DETECTED NG/MG CREAT
TEMAZEPAM/CREAT UR: NOT DETECTED NG/MG CREAT
THIOPENTAL UR QL CFM: NOT DETECTED
TRAMADOL UR QL CFM: NOT DETECTED NG/MG CREAT
UNIT  ABO: NORMAL
UNIT  ABO: NORMAL
UNIT  RH: NORMAL
UNIT  RH: NORMAL

## 2022-04-01 NOTE — HOME HEALTH
PATIENT IS DOING OK, SHE IS WALKING WHEN SHE CAN, SHE IS EXERCISING WHEN SHE CAN. PATIENT HAS SISTER ASSISTING HER AS ABLE. SHE IS READY FOR DISCAHRGE AT THIS TIME AND WILL CONTINUE ON HIS OWN

## 2022-04-01 NOTE — CASE COMMUNICATION
DISCHARGE SUMMARY    PATIENT WAS REFERRED TO HOME HEALTH FOLLOWING [facility]  FOR COPD AND ESRD. SHE HAS HAD MULTIPLE OBSERVATION [facility] STAYS SECONDARY TO ANEMIA AND ESRD. PATIENT HAS REACHED [facility]. SHE IS HAVING DIFFICULTY WITH HOME HEALTH SECONDARY TO MULTIPLE APPOINTMENTS FOR DIALYSIS AND MD VISITS. PATIENT IS READY FOR DISCHARGE AT THIS TIME AND [facility] REACHED [facility]. PATIENT REALLY FEELS SHE IS DOI NG EVERYTHING SHE CAN AT THIS TIME AND HOME HEALTH IS TOO MUCH    PRIOR VS CURRENT LEVEL  MMT 4- EVAL 4 DISCHARGE  GAIT 20 FEET EVAL, 50 FEET DISCHARGE  TRANSFER CGA EVAL MOD I DISCHARGE    DISCHARGE TO SELF CARE/FAMILY CARE  DISCHARGE CONDITION FAIR  DISCHARGE REASON [facility]

## 2022-04-02 LAB
QT INTERVAL: 462 MS
QTC INTERVAL: 488 MS

## 2022-04-06 ENCOUNTER — READMISSION MANAGEMENT (OUTPATIENT)
Dept: CALL CENTER | Facility: HOSPITAL | Age: 62
End: 2022-04-06

## 2022-04-06 NOTE — OUTREACH NOTE
Medical Week 2 Survey    Flowsheet Row Responses   [facility] facility patient discharged from? [place]   Does the patient have one of the following disease processes/diagnoses(primary or secondary)? Other   Week 2 attempt successful? No   Unsuccessful attempts Attempt 1          ROBB LAYTON - Registered Nurse

## 2022-04-12 ENCOUNTER — READMISSION MANAGEMENT (OUTPATIENT)
Dept: CALL CENTER | Facility: HOSPITAL | Age: 62
End: 2022-04-12

## 2022-04-12 NOTE — OUTREACH NOTE
Medical Week 2 Survey    Flowsheet Row Responses   [facility] patient discharged from? [place]   Does the patient have one of the following disease processes/diagnoses(primary or secondary)? Other   Week 2 attempt successful? Yes   Call start time 1620   Discharge diagnosis Admission for dialysis    Call end time 1622   Person spoke with today (if not patient) and relationship DaughterShiv HUNTER   Is the patient taking all medications as directed (includes completed medication regime)? Yes   Does the patient have a primary care provider?  Yes   Has the patient kept scheduled appointments due by today? --  [Daughter not sure on appts]   Psychosocial issues? No   What is the patient's perception of their health status since discharge? Same   Is the patient/caregiver able to teach back signs and symptoms related to disease process for when to call PCP? Yes   Is the patient/caregiver able to teach back signs and symptoms related to disease process for when to call 911? Yes   Is the patient/caregiver able to teach back the hierarchy of who to call/visit for symptoms/problems? PCP, Specialist, Home health nurse, Urgent Care, ED, 911 Yes   Week 2 Call Completed? Yes   Wrap up additional comments Daughter unsure on appts. States yesterday was bad day for Pt. Pt  does go to her Dialysis appts.           АЛЕКСАНДР BOWLES - Registered Nurse

## 2022-04-15 ENCOUNTER — APPOINTMENT (OUTPATIENT)
Dept: GENERAL RADIOLOGY | Facility: HOSPITAL | Age: 62
End: 2022-04-15

## 2022-04-15 ENCOUNTER — HOSPITAL ENCOUNTER (OUTPATIENT)
Facility: HOSPITAL | Age: 62
Setting detail: OBSERVATION
Discharge: HOME | End: 2022-04-19
Attending: EMERGENCY MEDICINE | Admitting: FAMILY MEDICINE

## 2022-04-15 DIAGNOSIS — Z74.09 IMPAIRED FUNCTIONAL MOBILITY AND ENDURANCE: ICD-10-CM

## 2022-04-15 DIAGNOSIS — J18.9 PNEUMONIA OF RIGHT LUNG DUE TO INFECTIOUS ORGANISM, UNSPECIFIED PART OF LUNG: ICD-10-CM

## 2022-04-15 DIAGNOSIS — Z79.4 TYPE 2 DIABETES MELLITUS WITH DIABETIC AUTONOMIC NEUROPATHY, WITH LONG-TERM CURRENT USE OF INSULIN: ICD-10-CM

## 2022-04-15 DIAGNOSIS — E11.43 TYPE 2 DIABETES MELLITUS WITH DIABETIC AUTONOMIC NEUROPATHY, WITH LONG-TERM CURRENT USE OF INSULIN: ICD-10-CM

## 2022-04-15 DIAGNOSIS — E87.70 HYPERVOLEMIA, UNSPECIFIED HYPERVOLEMIA TYPE: Primary | ICD-10-CM

## 2022-04-15 LAB
ALBUMIN SERPL-MCNC: 3.1 G/DL (ref 3.5–5.2)
ALBUMIN/GLOB SERPL: 0.7 G/DL
ALP SERPL-CCNC: 240 U/L (ref 39–117)
ALT SERPL W P-5'-P-CCNC: 8 U/L (ref 1–33)
ANION GAP SERPL CALCULATED.3IONS-SCNC: 11 MMOL/L (ref 5–15)
ANISOCYTOSIS BLD QL: ABNORMAL
AST SERPL-CCNC: 13 U/L (ref 1–32)
BASOPHILS # BLD MANUAL: 0.08 10*3/MM3 (ref 0–0.2)
BASOPHILS NFR BLD MANUAL: 1 % (ref 0–1.5)
BILIRUB SERPL-MCNC: 0.6 MG/DL (ref 0–1.2)
BUN SERPL-MCNC: 42 MG/DL (ref 8–23)
BUN/CREAT SERPL: 12.6 (ref 7–25)
CALCIUM SPEC-SCNC: 8.8 MG/DL (ref 8.6–10.5)
CHLORIDE SERPL-SCNC: 93 MMOL/L (ref 98–107)
CO2 SERPL-SCNC: 26 MMOL/L (ref 22–29)
CREAT SERPL-MCNC: 3.34 MG/DL (ref 0.57–1)
DEPRECATED RDW RBC AUTO: 55.2 FL (ref 37–54)
EGFRCR SERPLBLD CKD-EPI 2021: 14.9 ML/MIN/1.73
EOSINOPHIL # BLD MANUAL: 0.24 10*3/MM3 (ref 0–0.4)
EOSINOPHIL NFR BLD MANUAL: 3 % (ref 0.3–6.2)
ERYTHROCYTE [DISTWIDTH] IN BLOOD BY AUTOMATED COUNT: 17.1 % (ref 12.3–15.4)
FLUAV SUBTYP SPEC NAA+PROBE: NOT DETECTED
FLUBV RNA ISLT QL NAA+PROBE: NOT DETECTED
GLOBULIN UR ELPH-MCNC: 4.4 GM/DL
GLUCOSE BLDC GLUCOMTR-MCNC: 207 MG/DL (ref 70–130)
GLUCOSE SERPL-MCNC: 164 MG/DL (ref 65–99)
HCT VFR BLD AUTO: 24.2 % (ref 34–46.6)
HGB BLD-MCNC: 7.2 G/DL (ref 12–15.9)
HOLD SPECIMEN: NORMAL
HOLD SPECIMEN: NORMAL
LYMPHOCYTES # BLD MANUAL: 1.21 10*3/MM3 (ref 0.7–3.1)
LYMPHOCYTES NFR BLD MANUAL: 6 % (ref 5–12)
MCH RBC QN AUTO: 26.9 PG (ref 26.6–33)
MCHC RBC AUTO-ENTMCNC: 29.8 G/DL (ref 31.5–35.7)
MCV RBC AUTO: 90.3 FL (ref 79–97)
MONOCYTES # BLD: 0.48 10*3/MM3 (ref 0.1–0.9)
NEUTROPHILS # BLD AUTO: 6.06 10*3/MM3 (ref 1.7–7)
NEUTROPHILS NFR BLD MANUAL: 74 % (ref 42.7–76)
NEUTS BAND NFR BLD MANUAL: 1 % (ref 0–5)
NEUTS HYPERSEG # BLD: ABNORMAL 10*3/UL
NT-PROBNP SERPL-MCNC: 7506 PG/ML (ref 0–900)
PLATELET # BLD AUTO: 287 10*3/MM3 (ref 140–450)
PMV BLD AUTO: 8.9 FL (ref 6–12)
POLYCHROMASIA BLD QL SMEAR: ABNORMAL
POTASSIUM SERPL-SCNC: 3.6 MMOL/L (ref 3.5–5.2)
PROCALCITONIN SERPL-MCNC: 0.27 NG/ML (ref 0–0.25)
PROT SERPL-MCNC: 7.5 G/DL (ref 6–8.5)
RBC # BLD AUTO: 2.68 10*6/MM3 (ref 3.77–5.28)
SARS-COV-2 RNA PNL SPEC NAA+PROBE: NOT DETECTED
SMALL PLATELETS BLD QL SMEAR: ADEQUATE
SODIUM SERPL-SCNC: 130 MMOL/L (ref 136–145)
TROPONIN T SERPL-MCNC: 0.02 NG/ML (ref 0–0.03)
VARIANT LYMPHS NFR BLD MANUAL: 15 % (ref 19.6–45.3)
WBC NRBC COR # BLD: 8.08 10*3/MM3 (ref 3.4–10.8)
WHOLE BLOOD HOLD SPECIMEN: NORMAL

## 2022-04-15 PROCEDURE — 36415 COLL VENOUS BLD VENIPUNCTURE: CPT | Performed by: EMERGENCY MEDICINE

## 2022-04-15 PROCEDURE — G0378 HOSPITAL OBSERVATION PER HR: HCPCS

## 2022-04-15 PROCEDURE — 25010000002 METHYLPREDNISOLONE PER 125 MG: Performed by: EMERGENCY MEDICINE

## 2022-04-15 PROCEDURE — 96375 TX/PRO/DX INJ NEW DRUG ADDON: CPT

## 2022-04-15 PROCEDURE — 84145 PROCALCITONIN (PCT): CPT | Performed by: FAMILY MEDICINE

## 2022-04-15 PROCEDURE — 63710000001 INSULIN DETEMIR PER 5 UNITS: Performed by: STUDENT IN AN ORGANIZED HEALTH CARE EDUCATION/TRAINING PROGRAM

## 2022-04-15 PROCEDURE — 93005 ELECTROCARDIOGRAM TRACING: CPT | Performed by: EMERGENCY MEDICINE

## 2022-04-15 PROCEDURE — 96372 THER/PROPH/DIAG INJ SC/IM: CPT

## 2022-04-15 PROCEDURE — 85025 COMPLETE CBC W/AUTO DIFF WBC: CPT | Performed by: EMERGENCY MEDICINE

## 2022-04-15 PROCEDURE — 96374 THER/PROPH/DIAG INJ IV PUSH: CPT

## 2022-04-15 PROCEDURE — 80053 COMPREHEN METABOLIC PANEL: CPT | Performed by: EMERGENCY MEDICINE

## 2022-04-15 PROCEDURE — 93010 ELECTROCARDIOGRAM REPORT: CPT | Performed by: INTERNAL MEDICINE

## 2022-04-15 PROCEDURE — 25010000002 MORPHINE PER 10 MG: Performed by: EMERGENCY MEDICINE

## 2022-04-15 PROCEDURE — 87040 BLOOD CULTURE FOR BACTERIA: CPT | Performed by: EMERGENCY MEDICINE

## 2022-04-15 PROCEDURE — 25010000002 ONDANSETRON PER 1 MG: Performed by: EMERGENCY MEDICINE

## 2022-04-15 PROCEDURE — 94664 DEMO&/EVAL PT USE INHALER: CPT

## 2022-04-15 PROCEDURE — 85007 BL SMEAR W/DIFF WBC COUNT: CPT | Performed by: EMERGENCY MEDICINE

## 2022-04-15 PROCEDURE — 25010000002 FUROSEMIDE PER 20 MG: Performed by: STUDENT IN AN ORGANIZED HEALTH CARE EDUCATION/TRAINING PROGRAM

## 2022-04-15 PROCEDURE — 94799 UNLISTED PULMONARY SVC/PX: CPT

## 2022-04-15 PROCEDURE — 87636 SARSCOV2 & INF A&B AMP PRB: CPT | Performed by: EMERGENCY MEDICINE

## 2022-04-15 PROCEDURE — 94640 AIRWAY INHALATION TREATMENT: CPT

## 2022-04-15 PROCEDURE — 25010000002 CEFTRIAXONE PER 250 MG: Performed by: EMERGENCY MEDICINE

## 2022-04-15 PROCEDURE — 84484 ASSAY OF TROPONIN QUANT: CPT | Performed by: EMERGENCY MEDICINE

## 2022-04-15 PROCEDURE — 94760 N-INVAS EAR/PLS OXIMETRY 1: CPT

## 2022-04-15 PROCEDURE — 25010000002 HEPARIN (PORCINE) PER 1000 UNITS: Performed by: STUDENT IN AN ORGANIZED HEALTH CARE EDUCATION/TRAINING PROGRAM

## 2022-04-15 PROCEDURE — 99285 EMERGENCY DEPT VISIT HI MDM: CPT

## 2022-04-15 PROCEDURE — 82962 GLUCOSE BLOOD TEST: CPT

## 2022-04-15 PROCEDURE — 99219 PR INITIAL OBSERVATION CARE/DAY 50 MINUTES: CPT | Performed by: STUDENT IN AN ORGANIZED HEALTH CARE EDUCATION/TRAINING PROGRAM

## 2022-04-15 PROCEDURE — 71046 X-RAY EXAM CHEST 2 VIEWS: CPT

## 2022-04-15 PROCEDURE — 73110 X-RAY EXAM OF WRIST: CPT

## 2022-04-15 PROCEDURE — 83880 ASSAY OF NATRIURETIC PEPTIDE: CPT | Performed by: EMERGENCY MEDICINE

## 2022-04-15 PROCEDURE — C9803 HOPD COVID-19 SPEC COLLECT: HCPCS

## 2022-04-15 RX ORDER — IPRATROPIUM BROMIDE AND ALBUTEROL SULFATE 2.5; .5 MG/3ML; MG/3ML
3 SOLUTION RESPIRATORY (INHALATION)
Status: DISCONTINUED | OUTPATIENT
Start: 2022-04-15 | End: 2022-04-19 | Stop reason: HOSPADM

## 2022-04-15 RX ORDER — ONDANSETRON 2 MG/ML
4 INJECTION INTRAMUSCULAR; INTRAVENOUS ONCE
Status: COMPLETED | OUTPATIENT
Start: 2022-04-15 | End: 2022-04-15

## 2022-04-15 RX ORDER — GABAPENTIN 100 MG/1
100 CAPSULE ORAL EVERY 8 HOURS SCHEDULED
Status: DISCONTINUED | OUTPATIENT
Start: 2022-04-15 | End: 2022-04-17

## 2022-04-15 RX ORDER — ACETAMINOPHEN 650 MG/1
650 SUPPOSITORY RECTAL EVERY 4 HOURS PRN
Status: DISCONTINUED | OUTPATIENT
Start: 2022-04-15 | End: 2022-04-19 | Stop reason: HOSPADM

## 2022-04-15 RX ORDER — DULOXETIN HYDROCHLORIDE 20 MG/1
20 CAPSULE, DELAYED RELEASE ORAL DAILY
Status: DISCONTINUED | OUTPATIENT
Start: 2022-04-16 | End: 2022-04-19 | Stop reason: HOSPADM

## 2022-04-15 RX ORDER — GABAPENTIN 100 MG/1
200 CAPSULE ORAL EVERY 8 HOURS SCHEDULED
Status: DISCONTINUED | OUTPATIENT
Start: 2022-04-15 | End: 2022-04-15

## 2022-04-15 RX ORDER — METHYLPREDNISOLONE SODIUM SUCCINATE 125 MG/2ML
125 INJECTION, POWDER, LYOPHILIZED, FOR SOLUTION INTRAMUSCULAR; INTRAVENOUS ONCE
Status: COMPLETED | OUTPATIENT
Start: 2022-04-15 | End: 2022-04-15

## 2022-04-15 RX ORDER — POLYETHYLENE GLYCOL 3350 17 G/17G
17 POWDER, FOR SOLUTION ORAL DAILY PRN
Status: DISCONTINUED | OUTPATIENT
Start: 2022-04-15 | End: 2022-04-19 | Stop reason: HOSPADM

## 2022-04-15 RX ORDER — MONTELUKAST SODIUM 10 MG/1
10 TABLET ORAL NIGHTLY
Status: DISCONTINUED | OUTPATIENT
Start: 2022-04-15 | End: 2022-04-19 | Stop reason: HOSPADM

## 2022-04-15 RX ORDER — GABAPENTIN 400 MG/1
800 CAPSULE ORAL EVERY 8 HOURS SCHEDULED
Status: DISCONTINUED | OUTPATIENT
Start: 2022-04-15 | End: 2022-04-15

## 2022-04-15 RX ORDER — BISACODYL 10 MG
10 SUPPOSITORY, RECTAL RECTAL DAILY PRN
Status: DISCONTINUED | OUTPATIENT
Start: 2022-04-15 | End: 2022-04-19 | Stop reason: HOSPADM

## 2022-04-15 RX ORDER — HEPARIN SODIUM 5000 [USP'U]/ML
5000 INJECTION, SOLUTION INTRAVENOUS; SUBCUTANEOUS EVERY 8 HOURS SCHEDULED
Status: DISCONTINUED | OUTPATIENT
Start: 2022-04-15 | End: 2022-04-19 | Stop reason: HOSPADM

## 2022-04-15 RX ORDER — ONDANSETRON 4 MG/1
4 TABLET, FILM COATED ORAL EVERY 6 HOURS PRN
Status: DISCONTINUED | OUTPATIENT
Start: 2022-04-15 | End: 2022-04-19 | Stop reason: HOSPADM

## 2022-04-15 RX ORDER — SEVELAMER CARBONATE 800 MG/1
800 TABLET, FILM COATED ORAL
Status: DISCONTINUED | OUTPATIENT
Start: 2022-04-16 | End: 2022-04-19 | Stop reason: HOSPADM

## 2022-04-15 RX ORDER — PANTOPRAZOLE SODIUM 40 MG/1
40 TABLET, DELAYED RELEASE ORAL NIGHTLY
Status: DISCONTINUED | OUTPATIENT
Start: 2022-04-16 | End: 2022-04-19 | Stop reason: HOSPADM

## 2022-04-15 RX ORDER — ACETAMINOPHEN 160 MG/5ML
650 SOLUTION ORAL EVERY 4 HOURS PRN
Status: DISCONTINUED | OUTPATIENT
Start: 2022-04-15 | End: 2022-04-19 | Stop reason: HOSPADM

## 2022-04-15 RX ORDER — NICOTINE POLACRILEX 4 MG
15 LOZENGE BUCCAL
Status: DISCONTINUED | OUTPATIENT
Start: 2022-04-15 | End: 2022-04-17 | Stop reason: SDUPTHER

## 2022-04-15 RX ORDER — ALBUTEROL SULFATE 2.5 MG/3ML
2.5 SOLUTION RESPIRATORY (INHALATION) EVERY 4 HOURS PRN
Status: DISCONTINUED | OUTPATIENT
Start: 2022-04-15 | End: 2022-04-19 | Stop reason: HOSPADM

## 2022-04-15 RX ORDER — LEVOTHYROXINE SODIUM 0.03 MG/1
25 TABLET ORAL
Status: DISCONTINUED | OUTPATIENT
Start: 2022-04-16 | End: 2022-04-19 | Stop reason: HOSPADM

## 2022-04-15 RX ORDER — CLOPIDOGREL BISULFATE 75 MG/1
75 TABLET ORAL DAILY
Status: DISCONTINUED | OUTPATIENT
Start: 2022-04-16 | End: 2022-04-19 | Stop reason: HOSPADM

## 2022-04-15 RX ORDER — OXYBUTYNIN CHLORIDE 5 MG/1
5 TABLET, EXTENDED RELEASE ORAL DAILY
Status: DISCONTINUED | OUTPATIENT
Start: 2022-04-16 | End: 2022-04-19 | Stop reason: HOSPADM

## 2022-04-15 RX ORDER — NYSTATIN 100000 [USP'U]/G
POWDER TOPICAL 2 TIMES DAILY
Status: DISCONTINUED | OUTPATIENT
Start: 2022-04-16 | End: 2022-04-19 | Stop reason: HOSPADM

## 2022-04-15 RX ORDER — AMOXICILLIN 250 MG
2 CAPSULE ORAL 2 TIMES DAILY
Status: DISCONTINUED | OUTPATIENT
Start: 2022-04-15 | End: 2022-04-19 | Stop reason: HOSPADM

## 2022-04-15 RX ORDER — SODIUM CHLORIDE 0.9 % (FLUSH) 0.9 %
10 SYRINGE (ML) INJECTION AS NEEDED
Status: DISCONTINUED | OUTPATIENT
Start: 2022-04-15 | End: 2022-04-19 | Stop reason: HOSPADM

## 2022-04-15 RX ORDER — HYDROCODONE BITARTRATE AND ACETAMINOPHEN 5; 325 MG/1; MG/1
1 TABLET ORAL EVERY 4 HOURS PRN
Status: DISCONTINUED | OUTPATIENT
Start: 2022-04-15 | End: 2022-04-19 | Stop reason: HOSPADM

## 2022-04-15 RX ORDER — LISINOPRIL 5 MG/1
5 TABLET ORAL DAILY
Status: DISCONTINUED | OUTPATIENT
Start: 2022-04-16 | End: 2022-04-19 | Stop reason: HOSPADM

## 2022-04-15 RX ORDER — POTASSIUM CHLORIDE 750 MG/1
40 CAPSULE, EXTENDED RELEASE ORAL ONCE
Status: DISCONTINUED | OUTPATIENT
Start: 2022-04-15 | End: 2022-04-16

## 2022-04-15 RX ORDER — ISOSORBIDE MONONITRATE 30 MG/1
30 TABLET, EXTENDED RELEASE ORAL EVERY MORNING
Status: DISCONTINUED | OUTPATIENT
Start: 2022-04-16 | End: 2022-04-19 | Stop reason: HOSPADM

## 2022-04-15 RX ORDER — RANOLAZINE 500 MG/1
500 TABLET, EXTENDED RELEASE ORAL EVERY 12 HOURS SCHEDULED
Status: DISCONTINUED | OUTPATIENT
Start: 2022-04-15 | End: 2022-04-19 | Stop reason: HOSPADM

## 2022-04-15 RX ORDER — ASPIRIN 81 MG/1
81 TABLET ORAL DAILY
Status: DISCONTINUED | OUTPATIENT
Start: 2022-04-16 | End: 2022-04-19 | Stop reason: HOSPADM

## 2022-04-15 RX ORDER — ACETAMINOPHEN 325 MG/1
650 TABLET ORAL EVERY 4 HOURS PRN
Status: DISCONTINUED | OUTPATIENT
Start: 2022-04-15 | End: 2022-04-19 | Stop reason: HOSPADM

## 2022-04-15 RX ORDER — SODIUM CHLORIDE 0.9 % (FLUSH) 0.9 %
10 SYRINGE (ML) INJECTION EVERY 12 HOURS SCHEDULED
Status: DISCONTINUED | OUTPATIENT
Start: 2022-04-15 | End: 2022-04-19 | Stop reason: HOSPADM

## 2022-04-15 RX ORDER — FUROSEMIDE 10 MG/ML
80 INJECTION INTRAMUSCULAR; INTRAVENOUS ONCE
Status: COMPLETED | OUTPATIENT
Start: 2022-04-15 | End: 2022-04-15

## 2022-04-15 RX ORDER — DEXTROSE MONOHYDRATE 25 G/50ML
25 INJECTION, SOLUTION INTRAVENOUS
Status: DISCONTINUED | OUTPATIENT
Start: 2022-04-15 | End: 2022-04-17 | Stop reason: SDUPTHER

## 2022-04-15 RX ORDER — BISACODYL 5 MG/1
5 TABLET, DELAYED RELEASE ORAL DAILY PRN
Status: DISCONTINUED | OUTPATIENT
Start: 2022-04-15 | End: 2022-04-19 | Stop reason: HOSPADM

## 2022-04-15 RX ORDER — ATORVASTATIN CALCIUM 20 MG/1
20 TABLET, FILM COATED ORAL DAILY
Status: DISCONTINUED | OUTPATIENT
Start: 2022-04-15 | End: 2022-04-19 | Stop reason: HOSPADM

## 2022-04-15 RX ORDER — IPRATROPIUM BROMIDE AND ALBUTEROL SULFATE 2.5; .5 MG/3ML; MG/3ML
3 SOLUTION RESPIRATORY (INHALATION) ONCE
Status: COMPLETED | OUTPATIENT
Start: 2022-04-15 | End: 2022-04-15

## 2022-04-15 RX ADMIN — FUROSEMIDE 80 MG: 10 INJECTION, SOLUTION INTRAMUSCULAR; INTRAVENOUS at 18:41

## 2022-04-15 RX ADMIN — METOPROLOL TARTRATE 25 MG: 25 TABLET, FILM COATED ORAL at 20:59

## 2022-04-15 RX ADMIN — IPRATROPIUM BROMIDE AND ALBUTEROL SULFATE 3 ML: .5; 3 SOLUTION RESPIRATORY (INHALATION) at 15:01

## 2022-04-15 RX ADMIN — CEFTRIAXONE SODIUM 2 G: 2 INJECTION, POWDER, FOR SOLUTION INTRAMUSCULAR; INTRAVENOUS at 15:59

## 2022-04-15 RX ADMIN — GABAPENTIN 100 MG: 100 CAPSULE ORAL at 21:00

## 2022-04-15 RX ADMIN — HEPARIN SODIUM 5000 UNITS: 5000 INJECTION, SOLUTION INTRAVENOUS; SUBCUTANEOUS at 21:00

## 2022-04-15 RX ADMIN — RANOLAZINE 500 MG: 500 TABLET, FILM COATED, EXTENDED RELEASE ORAL at 21:00

## 2022-04-15 RX ADMIN — IPRATROPIUM BROMIDE AND ALBUTEROL SULFATE 3 ML: .5; 3 SOLUTION RESPIRATORY (INHALATION) at 19:21

## 2022-04-15 RX ADMIN — Medication 10 ML: at 20:59

## 2022-04-15 RX ADMIN — INSULIN DETEMIR 20 UNITS: 100 INJECTION, SOLUTION SUBCUTANEOUS at 21:01

## 2022-04-15 RX ADMIN — METHYLPREDNISOLONE SODIUM SUCCINATE 125 MG: 125 INJECTION, POWDER, FOR SOLUTION INTRAMUSCULAR; INTRAVENOUS at 14:47

## 2022-04-15 RX ADMIN — MONTELUKAST 10 MG: 10 TABLET, FILM COATED ORAL at 20:59

## 2022-04-15 RX ADMIN — MORPHINE SULFATE 4 MG: 4 INJECTION, SOLUTION INTRAMUSCULAR; INTRAVENOUS at 14:46

## 2022-04-15 RX ADMIN — ATORVASTATIN CALCIUM 20 MG: 20 TABLET, FILM COATED ORAL at 18:41

## 2022-04-15 RX ADMIN — ONDANSETRON 4 MG: 2 INJECTION INTRAMUSCULAR; INTRAVENOUS at 14:46

## 2022-04-15 NOTE — SIGNIFICANT NOTE
This patient was admitted today for shortness of breath.  Some of the medicine she has been prescribed are different from her last discharge summary.  I called pharmacy and spoke with pharmacist Miguelina to clarify about some of her medicines, and she looked over the discharge summary and her current orders with me.  Patient's outpatient COPD treatment continued inpatient.  Atorvastatin continued inpatient.  Patient does take Bumex on dialysis days and this will be managed by nephrology, which was consulted on admission and will be performing dialysis tomorrow.  Patient will be receiving 80 mg once of Lasix tonight again put in by resident on admission.  Patient duloxetine continued on admission.  Patient clopidogrel continued on admission.  Patient was discharged on 20 units of Levemir and this is continued on admission through the subcutaneous insulin administration order set.  Patient gabapentin for diabetic polyneuropathy continue admission.  Heart medicines isosorbide mononitrate and Ranexa continued on admission.  Lisinopril continued on admission.  Pantoprazole for GI prophylaxis continued inpatient.  Oxybutynin continued inpatient.  Patient had not been taking her Synthroid because she did not get it previously but this was renewed at this admission by admitting provider.    Patient's Capsaicin cream, Voltaren, and Flexeril not ordered admission.  As needed nitroglycerin for chest pain now ordered on admission and if patient develops chest pain will assess.  Patient had previously been prescribed nystatin cream in the past; not continued on admission and will order if need expressed by nursing or patient but this may have been a distant medicine.  Patient Mirabegron for urinary incontinence not ordered as this is not on formulary and patient told admitting resident that she no longer takes this medicine.      This document has been electronically signed by Alvarado Mauricio MD on April 15, 2022 18:07 CDT

## 2022-04-15 NOTE — ED PROVIDER NOTES
Subjective   62 years old female with multiple medical problems including diabetes mellitus, COPD, chronic respiratory failure on 3 L oxygen, tobacco abuse, coronary artery disease, end-stage renal disease on dialysis presented in the ER with chief complaint of chest pain and shortness of breath with progressive worsening lately.  Patient reports shortness of breath worsening with minimal activity and even with resting despite using oxygen.  Also having chronic smoker cough with not any worsening.  Earlier had a chest pain which is improved now.  Patient reports having bilateral rib pain for the last few days after she slid down on a couch and needed help from EMS to pick her up who squeezed her really hard.  She is also complaining of some pain in the left wrist with movements.      History provided by:  Patient      Review of Systems   Constitutional: Negative for appetite change and fever.   HENT: Negative for congestion, nosebleeds, rhinorrhea, sinus pressure and sneezing.    Respiratory: Positive for cough, chest tightness and shortness of breath.    Cardiovascular: Positive for chest pain and leg swelling.   Gastrointestinal: Negative for abdominal pain.   Genitourinary: Negative for flank pain.   Musculoskeletal: Positive for arthralgias.   Skin: Negative for color change.   Neurological: Negative for syncope and headaches.   Psychiatric/Behavioral: Negative for agitation.       Past Medical History:   Diagnosis Date   • Acute blood loss anemia 4/16/2017    Likely due to gastric oozing at this time. - Dr. Duarte (GI) was consulted and has now signed off, will follow up outpatient - pill colonoscopy showed AVMs - continue to monitor   • Acute cystitis with hematuria 3/31/2021    1/13: IV Rocephin 1 gm q 24 1/14 : transitioned  to omnicef 300mg. Urine cultures resulted and did not show growth. Omnicef discontinued as patient is asymptomatic   • Altered mental status 1/9/2022    - AMS on presentation - initial  ABG pH 7.3, CO2 34 - Procal 0.29 - UA negative for acute cysitits -CTA head wnl  - Empiric Zosyn and Vancomycin -Lactate 2.5 on admission  - blood cultures no growth at 24 hours     • Anxiety    • CAD (coronary artery disease) 4/24/2021    S/P 3 stents 5/1/2021 for [facility] Continue ASA 81mg & Clopidogrel 75mg Continue Atorvastatin 40mg   • Carotid artery stenosis    • Chronic obstructive lung disease ([facility])    • CKD (chronic kidney disease) stage 4, GFR 15-29 ml/min ([facility])    • CKD (chronic kidney disease), symptom management only, stage 5 ([facility]) 10/5/2020    Results from last 7 days Lab Units 12/15/21 0548 12/14/21 1323 12/14/21 0916 CREATININE mg/dL 3.92* 3.21* 3.32*  Baseline creatinine 2-3 GFR 13-25 GFR 15 Dialysis MWF, sees Dr. Lauren Nephrology consult,, appreciate recommendations Continue Bumex 1mg bid daily Holding Bumex 2mg 4 times a week   • Colonic polyp    • Coronary arteriosclerosis    • Diabetes mellitus ([facility])    • Diabetic neuropathy ([facility])    • Ear pain, right 10/18/2021    - canal trauma due to patient scratching and DMT2 - added cortisporin ear drops   • Elevated troponin 10/12/2021    -most likely from CKD -Trending down -Neg chest pain   • GERD (gastroesophageal reflux disease)    • GI bleed 5/13/2021    - GI will follow up outpatient - Protonix 40mg daily - Avoid medical DVT prophy and use mechanical at this time instead. - Continue to monitor - pill colonoscopy results showed AVMs   • History of transfusion    • Hypercholesterolemia    • Hypertension    • Hypomagnesemia 6/27/2021    Monitor and replace   • Morbid obesity ([facility])    • Nephrolithiasis    • Peripheral vascular disease ([facility])    • SIRS (systemic inflammatory response syndrome) ([facility]) 1/9/2022    Admission  - WBC 17.78   -   - RR 16 - 1/10: VSS/wnl - CXR - Mild pulm edema - Blood cultures no growth at 24 hours  - Procalcitonin 0.29 - UA : glucose 1000, negative Leucocytes/nitrate - Empiric Zosyn and Vancomcyin    • Sleep apnea    •  Substance abuse ([facility])    • Vitamin D deficiency        Allergies   Allergen Reactions   • Adhesive Tape Hives and Other (See Comments)   • Latex Other (See Comments)   • Nsaids Hives   • Other      Bandaids, MRSA, SURECLOSE       Past Surgical History:   Procedure Laterality Date   • CARDIAC CATHETERIZATION N/A 7/14/2020   • CARDIAC CATHETERIZATION N/A 4/23/2021    Procedure: Left Heart Cath;  Surgeon: Melba Romo MD;  Location: [facility] CATH INVASIVE LOCATION;  Service: Cardiology;  Laterality: N/A;   • CARDIAC CATHETERIZATION N/A 4/30/2021    Procedure: Percutaneous Coronary Intervention;  Surgeon: Russell Voss MD;  Location: [facility] CATH INVASIVE LOCATION;  Service: Cardiovascular;  Laterality: N/A;   • CARDIAC CATHETERIZATION N/A 4/30/2021    Procedure: Stent NIKKI coronary;  Surgeon: Russell Voss MD;  Location: [facility] CATH INVASIVE LOCATION;  Service: Cardiovascular;  Laterality: N/A;   • CARDIAC CATHETERIZATION Left 11/13/2021    Procedure: Left Heart Cath;  Surgeon: Niall Rios MD;  Location: [facility] CATH INVASIVE LOCATION;  Service: Cardiology;  Laterality: Left;   • CAROTID STENT Left    • COLONOSCOPY     • COLONOSCOPY N/A 5/14/2021    Procedure: COLONOSCOPY;  Surgeon: Mingo Duarte MD;  Location: [facility] ENDOSCOPY;  Service: Gastroenterology;  Laterality: N/A;   • CORONARY ARTERY BYPASS GRAFT N/A 2013    CABG X 3   • CYSTOSCOPY BLADDER STONE LITHOTRIPSY Bilateral    • ENDOSCOPY N/A 4/12/2021    Procedure: ESOPHAGOGASTRODUODENOSCOPY;  Surgeon: Mingo Duarte MD;  Location: [facility] ENDOSCOPY;  Service: Gastroenterology;  Laterality: N/A;   • ENDOSCOPY N/A 5/14/2021    Procedure: ESOPHAGOGASTRODUODENOSCOPY;  Surgeon: Mingo Duarte MD;  Location: [facility] ENDOSCOPY;  Service: Gastroenterology;  Laterality: N/A;   • ENDOSCOPY N/A 1/28/2022    Procedure: ESOPHAGOGASTRODUODENOSCOPY;  Surgeon: Mingo Duarte MD;  Location: [facility] ENDOSCOPY;  Service: Gastroenterology;   Laterality: N/A;   • INTERVENTIONAL RADIOLOGY PROCEDURE N/A 10/21/2021    Procedure: tunneled central venous catheter placement;  Surgeon: Donnie Robles MD;  Location: [facility] ANGIO INVASIVE LOCATION;  Service: Interventional Radiology;  Laterality: N/A;   • INTERVENTIONAL RADIOLOGY PROCEDURE N/A 2022    Procedure: tunneled central venous catheter placement;  Surgeon: Donnie Robles MD;  Location: [facility] INVASIVE LOCATION;  Service: Interventional Radiology;  Laterality: N/A;       Family History   Problem Relation Age of Onset   • Heart disease Mother    • Lung cancer Mother    • Heart disease Father    • Heart attack Father    • Diabetes Father    • Heart disease Half-Sister         Dad's side   • Heart disease Brother    • No Known Problems Sister    • No Known Problems Sister    • No Known Problems Sister    • No Known Problems Sister    • No Known Problems Sister    • Pancreatic cancer Half-Sister         Dad's side   • No Known Problems Brother    • No Known Problems Brother    • Heart attack Half-Brother    • Heart attack Half-Brother    • No Known Problems Maternal Grandmother    • No Known Problems Maternal Grandfather    • No Known Problems Paternal Grandmother    • No Known Problems Paternal Grandfather        Social History     Socioeconomic History   • Marital status:      Spouse name: wesley dumont   Tobacco Use   • Smoking status: Former Smoker     Packs/day: 0.25     Years: 46.00     Pack years: 11.50     Types: Cigarettes     Start date: 1999     Quit date: 2/15/2022     Years since quittin.1   • Smokeless tobacco: Never Used   • Tobacco comment: only smoking 5 a day - quit 2021   Substance and Sexual Activity   • Alcohol use: No   • Drug use: Not Currently     Types: LSD, Marijuana, Methamphetamines   • Sexual activity: Not Currently           Objective   Physical Exam  Vitals and nursing note reviewed.   Constitutional:       Appearance: She is  well-developed.   HENT:      Head: Normocephalic.      Mouth/Throat:      Mouth: Mucous membranes are moist.   Eyes:      Pupils: Pupils are equal, round, and reactive to light.   Cardiovascular:      Rate and Rhythm: Normal rate and regular rhythm.   Pulmonary:      Effort: No accessory muscle usage or respiratory distress.      Breath sounds: Decreased breath sounds, wheezing and rhonchi present.      Comments: Tunnel catheter well in place  Musculoskeletal:         General: Normal range of motion.      Cervical back: Normal range of motion.   Skin:     General: Skin is warm.      Capillary Refill: Capillary refill takes less than 2 seconds.   Neurological:      General: No focal deficit present.      Mental Status: She is alert.   Psychiatric:         Mood and Affect: Mood is anxious.         ECG 12 Lead      Date/Time: 4/15/2022 2:51 PM  Performed by: Rishabh Cruz MD  Authorized by: Rishabh Cruz MD   Interpreted by physician  Rhythm: sinus rhythm  Ectopy: PVCs  Rate: normal  BPM: 73  QRS axis: left  Conduction: right bundle branch block  Clinical impression: abnormal ECG                 ED Course                                                 MDM  Number of Diagnoses or Management Options  Hypervolemia, unspecified hypervolemia type  Pneumonia of right lung due to infectious organism, unspecified part of lung  Diagnosis management comments: Patient dyspnea is multifactorial with fluid overload/pneumonia/COPD exacerbation, given a dose of antibiotic, DuoNeb and steroid.  Discussed with resident on-call Dr. Mackay and patient is being admitted.       Amount and/or Complexity of Data Reviewed  Clinical lab tests: ordered and reviewed  Tests in the radiology section of CPT®: ordered and reviewed  Discuss the patient with other providers: yes      Labs Reviewed   COMPREHENSIVE METABOLIC PANEL - Abnormal; Notable for the following components:       Result Value    Glucose 164 (*)     BUN 42 (*)     Creatinine 3.34  "(*)     Sodium 130 (*)     Chloride 93 (*)     Albumin 3.10 (*)     Alkaline Phosphatase 240 (*)     eGFR 14.9 (*)     All other components within normal limits    Narrative:     GFR Normal >60  Chronic Kidney Disease <60  Kidney Failure <15     BNP (IN-HOUSE) - Abnormal; Notable for the following components:    proBNP 7,506.0 (*)     All other components within normal limits    Narrative:     Among patients with dyspnea, NT-proBNP is highly sensitive for the detection of acute congestive heart failure. In addition NT-proBNP of <300 pg/ml effectively rules out acute congestive heart failure with 99% negative predictive value.    Results may be falsely decreased if patient taking Biotin.     CBC WITH AUTO DIFFERENTIAL - Abnormal; Notable for the following components:    RBC 2.68 (*)     Hemoglobin 7.2 (*)     Hematocrit 24.2 (*)     MCHC 29.8 (*)     RDW 17.1 (*)     RDW-SD 55.2 (*)     All other components within normal limits   MANUAL DIFFERENTIAL - Abnormal; Notable for the following components:    Lymphocyte % 15.0 (*)     All other components within normal limits   PROCALCITONIN - Abnormal; Notable for the following components:    Procalcitonin 0.27 (*)     All other components within normal limits    Narrative:     As a Marker for Sepsis (Non-Neonates):    1. <0.5 ng/mL represents a low risk of severe sepsis and/or septic shock.  2. >2 ng/mL represents a high risk of severe sepsis and/or septic shock.    As a Marker for Lower Respiratory Tract Infections that require antibiotic therapy:    PCT on Admission    Antibiotic Therapy       6-12 Hrs later    >0.5                Strongly Recommended  >0.25 - <0.5        Recommended   0.1 - 0.25          Discouraged              Remeasure/reassess PCT  <0.1                Strongly Discouraged     Remeasure/reassess PCT    As 28 day mortality risk marker: \"Change in Procalcitonin Result\" (>80% or <=80%) if Day 0 (or Day 1) and Day 4 values are available. Refer to " http://www.[facility]-pct-calculator.com    Change in PCT <=80%  A decrease of PCT levels below or equal to 80% defines a positive change in PCT test result representing a higher risk for 28-day all-cause mortality of patients diagnosed with severe sepsis for septic shock.    Change in PCT >80%  A decrease of PCT levels of more than 80% defines a negative change in PCT result representing a lower risk for 28-day all-cause mortality of patients diagnosed with severe sepsis or septic shock.      POCT GLUCOSE FINGERSTICK - Abnormal; Notable for the following components:    Glucose 207 (*)     All other components within normal limits   COVID-19 AND FLU A/B, NP SWAB IN TRANSPORT MEDIA 8-12 HR TAT - Normal    Narrative:     Fact sheet for providers: https://www.fda.gov/media/732498/download    Fact sheet for patients: https://www.fda.gov/media/429692/download    Test performed by PCR.   TROPONIN (IN-HOUSE) - Normal    Narrative:     Troponin T Reference Range:  <= 0.03 ng/mL-   Negative for AMI  >0.03 ng/mL-     Abnormal for myocardial necrosis.  Clinicians would have to utilize clinical acumen, EKG, Troponin and serial changes to determine if it is an Acute Myocardial Infarction or myocardial injury due to an underlying chronic condition.       Results may be falsely decreased if patient taking Biotin.     BLOOD CULTURE   BLOOD CULTURE   COMPREHENSIVE METABOLIC PANEL   CBC WITH AUTO DIFFERENTIAL   POCT GLUCOSE FINGERSTICK   POCT GLUCOSE FINGERSTICK   POCT GLUCOSE FINGERSTICK   POCT GLUCOSE FINGERSTICK   POCT GLUCOSE FINGERSTICK   CBC AND DIFFERENTIAL    Narrative:     The following orders were created for panel order CBC & Differential.  Procedure                               Abnormality         Status                     ---------                               -----------         ------                     CBC Auto Differential[[id number]]        Abnormal            Final result                 Please view results for  these tests on the individual orders.   EXTRA TUBES    Narrative:     The following orders were created for panel order Extra Tubes.  Procedure                               Abnormality         Status                     ---------                               -----------         ------                     Gold Top - SST[[id number]]                                   Final result               Richards Top[[id number]]                                         Final result               Light Blue Top[[id number]]                                   Final result                 Please view results for these tests on the individual orders.   GOLD TOP - SST   GRAY TOP   LIGHT BLUE TOP   CBC AND DIFFERENTIAL    Narrative:     The following orders were created for panel order CBC & Differential.  Procedure                               Abnormality         Status                     ---------                               -----------         ------                     CBC Auto Differential[[id number]]                                                         Please view results for these tests on the individual orders.       XR Chest 2 View    Result Date: 4/15/2022  Narrative: Comparison: 3/28/2022 Indication: Shortness of air, triage protocol Findings: PA and lateral views of the chest are obtained. There is a tunneled right internal jugular catheter with its tip at the cavoatrial junction. There is cardiomegaly. The mediastinum is normal width. Prominence of central vasculature is cephalization. Right pleural effusion. Right base airspace consolidation. Diffuse interstitial opacities.     Impression: Cardiomegaly. Central vascular congestion. Diffuse interstitial opacities likely edema. Right base opacity consistent with effusion and airspace consolidation. Electronically signed by:  Froylan Bennett MD  4/15/2022 2:58 PM CDT Workstation: [id number]    XR Chest 2 View    Result Date: 3/28/2022  Narrative: EXAMINATION:  XR CHEST 2 VW CLINICAL  HISTORY:  62 years Female,SOA Triage Protocol TECHNIQUE: Portable chest] COMPARISON:   3/16/2022 FINDINGS: Heart is mildly enlarged with some pulmonary vascular congestion. Right-sided large bore venous catheter again noted with tip at in superior vena cava. Small right pleural effusion. No pneumothorax. Prior median sternotomy and CABG surgery.     Impression: 1. Mild cardiomegaly with some pulmonary vascular congestion and small right pleural effusion similar to prior exam. Electronically signed by:  Huy Esquivel MD  3/28/2022 1:35 PM CDT Workstation: [id number]    XR Wrist 3+ View Left    Result Date: 4/15/2022  Narrative: Procedure: XR WRIST 3 OR MORE VIEWS. History: R/O FX. Comparison: None Findings: Four x-rays of the left wrist were obtained. There is mild narrowing of the radiocarpal joint. Soft tissue calcifications along the radial artery are likely due to peripheral vascular disease. There is no radiographic evidence of acute fracture, dislocation or suspicious bony abnormality.     Impression: Impression: No radiographic evidence of acute fracture. Electronically signed by:  Umesh Parra MD  4/15/2022 3:24 PM CDT Workstation: [id number]        Final diagnoses:   Hypervolemia, unspecified hypervolemia type   Pneumonia of right lung due to infectious organism, unspecified part of lung       ED Disposition  ED Disposition     ED Disposition   Decision to Admit    Condition   --    Comment   Level of Care: Med/Surg [1]   Diagnosis: Hypervolemia, unspecified hypervolemia type [[id number]]   Admitting Physician: VIRGINIA BHANDARI [[id number]]   Attending Physician: VIRGINIA BHANDARI [[id number]]               No follow-up provider specified.       Medication List      No changes were made to your prescriptions during this visit.          Rishabh Cruz MD  04/15/22 [id number]

## 2022-04-16 ENCOUNTER — READMISSION MANAGEMENT (OUTPATIENT)
Dept: CALL CENTER | Facility: HOSPITAL | Age: 62
End: 2022-04-16

## 2022-04-16 PROBLEM — E11.43 TYPE 2 DIABETES MELLITUS WITH AUTONOMIC NEUROPATHY: Status: ACTIVE | Noted: 2022-01-11

## 2022-04-16 PROBLEM — E87.70 HYPERVOLEMIA: Status: RESOLVED | Noted: 2022-04-15 | Resolved: 2022-04-16

## 2022-04-16 PROBLEM — E87.70 HYPERVOLEMIA, UNSPECIFIED HYPERVOLEMIA TYPE: Status: RESOLVED | Noted: 2022-04-15 | Resolved: 2022-04-16

## 2022-04-16 PROBLEM — J18.9 PNEUMONIA DUE TO INFECTIOUS ORGANISM: Status: RESOLVED | Noted: 2021-05-19 | Resolved: 2022-04-16

## 2022-04-16 LAB
ACETONE BLD QL: NEGATIVE
ALBUMIN SERPL-MCNC: 3.5 G/DL (ref 3.5–5.2)
ALBUMIN/GLOB SERPL: 0.9 G/DL
ALP SERPL-CCNC: 223 U/L (ref 39–117)
ALT SERPL W P-5'-P-CCNC: 8 U/L (ref 1–33)
ANION GAP SERPL CALCULATED.3IONS-SCNC: 13 MMOL/L (ref 5–15)
ANION GAP SERPL CALCULATED.3IONS-SCNC: 13 MMOL/L (ref 5–15)
AST SERPL-CCNC: 10 U/L (ref 1–32)
BASOPHILS # BLD AUTO: 0.02 10*3/MM3 (ref 0–0.2)
BASOPHILS NFR BLD AUTO: 0.2 % (ref 0–1.5)
BILIRUB SERPL-MCNC: 0.4 MG/DL (ref 0–1.2)
BUN SERPL-MCNC: 39 MG/DL (ref 8–23)
BUN SERPL-MCNC: 57 MG/DL (ref 8–23)
BUN/CREAT SERPL: 14.4 (ref 7–25)
BUN/CREAT SERPL: 16.1 (ref 7–25)
CALCIUM SPEC-SCNC: 8.2 MG/DL (ref 8.6–10.5)
CALCIUM SPEC-SCNC: 8.5 MG/DL (ref 8.6–10.5)
CHLORIDE SERPL-SCNC: 91 MMOL/L (ref 98–107)
CHLORIDE SERPL-SCNC: 92 MMOL/L (ref 98–107)
CO2 SERPL-SCNC: 24 MMOL/L (ref 22–29)
CO2 SERPL-SCNC: 25 MMOL/L (ref 22–29)
CREAT SERPL-MCNC: 2.71 MG/DL (ref 0.57–1)
CREAT SERPL-MCNC: 3.55 MG/DL (ref 0.57–1)
DEPRECATED RDW RBC AUTO: 53.8 FL (ref 37–54)
EGFRCR SERPLBLD CKD-EPI 2021: 13.9 ML/MIN/1.73
EGFRCR SERPLBLD CKD-EPI 2021: 19.2 ML/MIN/1.73
EOSINOPHIL # BLD AUTO: 0 10*3/MM3 (ref 0–0.4)
EOSINOPHIL NFR BLD AUTO: 0 % (ref 0.3–6.2)
ERYTHROCYTE [DISTWIDTH] IN BLOOD BY AUTOMATED COUNT: 16.6 % (ref 12.3–15.4)
GLOBULIN UR ELPH-MCNC: 4 GM/DL
GLUCOSE BLDC GLUCOMTR-MCNC: 330 MG/DL (ref 70–130)
GLUCOSE BLDC GLUCOMTR-MCNC: 362 MG/DL (ref 70–130)
GLUCOSE SERPL-MCNC: 321 MG/DL (ref 65–99)
GLUCOSE SERPL-MCNC: 428 MG/DL (ref 65–99)
GLUCOSE SERPL-MCNC: 464 MG/DL (ref 65–99)
HCT VFR BLD AUTO: 24.1 % (ref 34–46.6)
HGB BLD-MCNC: 7.4 G/DL (ref 12–15.9)
IMM GRANULOCYTES # BLD AUTO: 0.15 10*3/MM3 (ref 0–0.05)
IMM GRANULOCYTES NFR BLD AUTO: 1.7 % (ref 0–0.5)
LYMPHOCYTES # BLD AUTO: 0.54 10*3/MM3 (ref 0.7–3.1)
LYMPHOCYTES NFR BLD AUTO: 6.3 % (ref 19.6–45.3)
MCH RBC QN AUTO: 27.6 PG (ref 26.6–33)
MCHC RBC AUTO-ENTMCNC: 30.7 G/DL (ref 31.5–35.7)
MCV RBC AUTO: 89.9 FL (ref 79–97)
MONOCYTES # BLD AUTO: 0.14 10*3/MM3 (ref 0.1–0.9)
MONOCYTES NFR BLD AUTO: 1.6 % (ref 5–12)
NEUTROPHILS NFR BLD AUTO: 7.73 10*3/MM3 (ref 1.7–7)
NEUTROPHILS NFR BLD AUTO: 90.2 % (ref 42.7–76)
NRBC BLD AUTO-RTO: 0.2 /100 WBC (ref 0–0.2)
OSMOLALITY SERPL: 306 MOSM/KG (ref 280–301)
PLATELET # BLD AUTO: 267 10*3/MM3 (ref 140–450)
PMV BLD AUTO: 9.3 FL (ref 6–12)
POTASSIUM SERPL-SCNC: 4.1 MMOL/L (ref 3.5–5.2)
POTASSIUM SERPL-SCNC: 5 MMOL/L (ref 3.5–5.2)
PROT SERPL-MCNC: 7.5 G/DL (ref 6–8.5)
QT INTERVAL: 440 MS
QTC INTERVAL: 485 MS
RBC # BLD AUTO: 2.68 10*6/MM3 (ref 3.77–5.28)
SODIUM SERPL-SCNC: 129 MMOL/L (ref 136–145)
SODIUM SERPL-SCNC: 129 MMOL/L (ref 136–145)
WBC NRBC COR # BLD: 8.58 10*3/MM3 (ref 3.4–10.8)

## 2022-04-16 PROCEDURE — 99224 PR SBSQ OBSERVATION CARE/DAY 15 MINUTES: CPT | Performed by: STUDENT IN AN ORGANIZED HEALTH CARE EDUCATION/TRAINING PROGRAM

## 2022-04-16 PROCEDURE — 94799 UNLISTED PULMONARY SVC/PX: CPT

## 2022-04-16 PROCEDURE — 82962 GLUCOSE BLOOD TEST: CPT

## 2022-04-16 PROCEDURE — 82009 KETONE BODYS QUAL: CPT | Performed by: STUDENT IN AN ORGANIZED HEALTH CARE EDUCATION/TRAINING PROGRAM

## 2022-04-16 PROCEDURE — G0378 HOSPITAL OBSERVATION PER HR: HCPCS

## 2022-04-16 PROCEDURE — 96372 THER/PROPH/DIAG INJ SC/IM: CPT

## 2022-04-16 PROCEDURE — G0257 UNSCHED DIALYSIS ESRD PT HOS: HCPCS

## 2022-04-16 PROCEDURE — 85025 COMPLETE CBC W/AUTO DIFF WBC: CPT | Performed by: STUDENT IN AN ORGANIZED HEALTH CARE EDUCATION/TRAINING PROGRAM

## 2022-04-16 PROCEDURE — 82947 ASSAY GLUCOSE BLOOD QUANT: CPT | Performed by: STUDENT IN AN ORGANIZED HEALTH CARE EDUCATION/TRAINING PROGRAM

## 2022-04-16 PROCEDURE — 25010000002 EPOETIN ALFA PER 1000 UNITS: Performed by: INTERNAL MEDICINE

## 2022-04-16 PROCEDURE — 94760 N-INVAS EAR/PLS OXIMETRY 1: CPT

## 2022-04-16 PROCEDURE — 25010000002 HEPARIN (PORCINE) PER 1000 UNITS: Performed by: STUDENT IN AN ORGANIZED HEALTH CARE EDUCATION/TRAINING PROGRAM

## 2022-04-16 PROCEDURE — 63710000001 INSULIN ASPART PER 5 UNITS: Performed by: STUDENT IN AN ORGANIZED HEALTH CARE EDUCATION/TRAINING PROGRAM

## 2022-04-16 PROCEDURE — 83930 ASSAY OF BLOOD OSMOLALITY: CPT | Performed by: STUDENT IN AN ORGANIZED HEALTH CARE EDUCATION/TRAINING PROGRAM

## 2022-04-16 PROCEDURE — 25010000002 HEPARIN (PORCINE) PER 1000 UNITS: Performed by: INTERNAL MEDICINE

## 2022-04-16 PROCEDURE — 80053 COMPREHEN METABOLIC PANEL: CPT | Performed by: STUDENT IN AN ORGANIZED HEALTH CARE EDUCATION/TRAINING PROGRAM

## 2022-04-16 PROCEDURE — 63710000001 INSULIN DETEMIR PER 5 UNITS: Performed by: FAMILY MEDICINE

## 2022-04-16 RX ORDER — NICOTINE POLACRILEX 4 MG
15 LOZENGE BUCCAL
Status: DISCONTINUED | OUTPATIENT
Start: 2022-04-16 | End: 2022-04-16 | Stop reason: SDUPTHER

## 2022-04-16 RX ORDER — HEPARIN SODIUM 1000 [USP'U]/ML
4000 INJECTION, SOLUTION INTRAVENOUS; SUBCUTANEOUS AS NEEDED
Status: DISCONTINUED | OUTPATIENT
Start: 2022-04-16 | End: 2022-04-19 | Stop reason: HOSPADM

## 2022-04-16 RX ORDER — DEXTROSE MONOHYDRATE 25 G/50ML
25 INJECTION, SOLUTION INTRAVENOUS
Status: DISCONTINUED | OUTPATIENT
Start: 2022-04-16 | End: 2022-04-16 | Stop reason: SDUPTHER

## 2022-04-16 RX ORDER — ALBUMIN (HUMAN) 12.5 G/50ML
12.5 SOLUTION INTRAVENOUS AS NEEDED
Status: ACTIVE | OUTPATIENT
Start: 2022-04-16 | End: 2022-04-17

## 2022-04-16 RX ADMIN — HEPARIN SODIUM 5000 UNITS: 5000 INJECTION, SOLUTION INTRAVENOUS; SUBCUTANEOUS at 05:18

## 2022-04-16 RX ADMIN — Medication 10 ML: at 09:00

## 2022-04-16 RX ADMIN — METOPROLOL TARTRATE 25 MG: 25 TABLET, FILM COATED ORAL at 12:43

## 2022-04-16 RX ADMIN — INSULIN ASPART 16 UNITS: 100 INJECTION, SOLUTION INTRAVENOUS; SUBCUTANEOUS at 11:26

## 2022-04-16 RX ADMIN — CLOPIDOGREL BISULFATE 75 MG: 75 TABLET ORAL at 12:58

## 2022-04-16 RX ADMIN — HEPARIN SODIUM 4000 UNITS: 1000 INJECTION, SOLUTION INTRAVENOUS; SUBCUTANEOUS at 08:21

## 2022-04-16 RX ADMIN — MONTELUKAST 10 MG: 10 TABLET, FILM COATED ORAL at 20:22

## 2022-04-16 RX ADMIN — OXYBUTYNIN CHLORIDE 5 MG: 5 TABLET, EXTENDED RELEASE ORAL at 12:43

## 2022-04-16 RX ADMIN — DULOXETINE HYDROCHLORIDE 20 MG: 20 CAPSULE, DELAYED RELEASE ORAL at 12:43

## 2022-04-16 RX ADMIN — INSULIN ASPART 9 UNITS: 100 INJECTION, SOLUTION INTRAVENOUS; SUBCUTANEOUS at 21:59

## 2022-04-16 RX ADMIN — Medication 10 ML: at 20:23

## 2022-04-16 RX ADMIN — IPRATROPIUM BROMIDE AND ALBUTEROL SULFATE 3 ML: .5; 3 SOLUTION RESPIRATORY (INHALATION) at 15:21

## 2022-04-16 RX ADMIN — SEVELAMER CARBONATE 800 MG: 800 TABLET, FILM COATED ORAL at 17:33

## 2022-04-16 RX ADMIN — IPRATROPIUM BROMIDE AND ALBUTEROL SULFATE 3 ML: .5; 3 SOLUTION RESPIRATORY (INHALATION) at 18:25

## 2022-04-16 RX ADMIN — GABAPENTIN 100 MG: 100 CAPSULE ORAL at 20:23

## 2022-04-16 RX ADMIN — PANTOPRAZOLE SODIUM 40 MG: 40 TABLET, DELAYED RELEASE ORAL at 20:23

## 2022-04-16 RX ADMIN — LISINOPRIL 5 MG: 20 TABLET ORAL at 12:43

## 2022-04-16 RX ADMIN — ATORVASTATIN CALCIUM 20 MG: 20 TABLET, FILM COATED ORAL at 12:42

## 2022-04-16 RX ADMIN — RANOLAZINE 500 MG: 500 TABLET, FILM COATED, EXTENDED RELEASE ORAL at 12:58

## 2022-04-16 RX ADMIN — SEVELAMER CARBONATE 800 MG: 800 TABLET, FILM COATED ORAL at 12:42

## 2022-04-16 RX ADMIN — HEPARIN SODIUM 5000 UNITS: 5000 INJECTION, SOLUTION INTRAVENOUS; SUBCUTANEOUS at 20:23

## 2022-04-16 RX ADMIN — HEPARIN SODIUM 5000 UNITS: 5000 INJECTION, SOLUTION INTRAVENOUS; SUBCUTANEOUS at 13:33

## 2022-04-16 RX ADMIN — IPRATROPIUM BROMIDE AND ALBUTEROL SULFATE 3 ML: .5; 3 SOLUTION RESPIRATORY (INHALATION) at 11:43

## 2022-04-16 RX ADMIN — ASPIRIN 81 MG: 81 TABLET, FILM COATED ORAL at 12:42

## 2022-04-16 RX ADMIN — INSULIN DETEMIR 27 UNITS: 100 INJECTION, SOLUTION SUBCUTANEOUS at 20:54

## 2022-04-16 RX ADMIN — IPRATROPIUM BROMIDE AND ALBUTEROL SULFATE 3 ML: .5; 3 SOLUTION RESPIRATORY (INHALATION) at 08:07

## 2022-04-16 RX ADMIN — INSULIN ASPART 20 UNITS: 100 INJECTION, SOLUTION INTRAVENOUS; SUBCUTANEOUS at 07:45

## 2022-04-16 RX ADMIN — INSULIN ASPART 24 UNITS: 100 INJECTION, SOLUTION INTRAVENOUS; SUBCUTANEOUS at 17:33

## 2022-04-16 RX ADMIN — RANOLAZINE 500 MG: 500 TABLET, FILM COATED, EXTENDED RELEASE ORAL at 20:23

## 2022-04-16 RX ADMIN — EPOETIN ALFA 10000 UNITS: 10000 SOLUTION INTRAVENOUS; SUBCUTANEOUS at 08:19

## 2022-04-16 RX ADMIN — DOCUSATE SODIUM 50 MG AND SENNOSIDES 8.6 MG 2 TABLET: 8.6; 5 TABLET, FILM COATED ORAL at 12:42

## 2022-04-16 RX ADMIN — METOPROLOL TARTRATE 25 MG: 25 TABLET, FILM COATED ORAL at 20:23

## 2022-04-16 NOTE — NURSING NOTE
Pt was very drowsy at start of shift, has become confused as shift progressed, pt states that happens when she is in the hospital. Impulsive, attempts to get up without assistance, removes pur wick, pads, and tele throws it in floor and doesn't recall doing so. She is able to be reoriented but continues behavior and forgets what she has done.

## 2022-04-16 NOTE — PROGRESS NOTES
[unfilled]     62 y.o. female with a CMH of COPD on 3L O2, ESRD with HD MWF, anemia, T2DM, HTN, HLD, CAD who presents complaining of shortness of air. Patient missed her dialysis appointment.    I have seen and evaluated the patient on 04/15/22 in ED with resident Dr. Brian Santa.  I have discussed the case with the resident. I have reviewed the notes, assessment and plan, and/or procedures performed by the resident. I concur with the resident’s documentation.       Interval Events: Pt seen in ED- remains on chronic 3L O2  Presently denying chest pain- states she fell a few days prior to admission notes Lt wrist pain - reviewed negative Xrays..  .    Temp:  [96.4 °F (35.8 °C)-97.3 °F (36.3 °C)] 96.4 °F (35.8 °C)  Heart Rate:  [66-74] 72  Resp:  [18-22] 18  BP: (133-177)/(57-87) 135/60    GEN:  NAD  CV: S1S2  LUNG: decreased BS, scattered crackles  ABD:  + obesity, +BS, s/nt/nd,   EXT:  +pulses  Rt chest tunneled cath    A/P: 62 y.o. female admitted with:  Active Hospital Problems    Diagnosis  POA   • Hypervolemia [E87.70]  Yes   • Hypervolemia, unspecified hypervolemia type [E87.70]  Yes      Resolved Hospital Problems   No resolved problems to display.     Daily plan:  -Volume overload- secondary to missed HD/ESRD - Elevated BNP,Cxray with findings of central vascular congestion, diffuse interstiial opacities- likely edema.  Appreciate discussion w/Nephro  - will give Lasix 80mg IV X1.  Plan for HD in am - no urgent indications at present/on admission.  Lakesha Sevelamer  -? RLL infection-  ?Rt Base opacity.  COVID/Influenza- negative.  Procalcitonin- mildly elevated at 0.27. Received empiric ABx in ED- hold on further.    -COPD- cont home 3L O2, inhalers  -DM2 :  Cont Detemir 20 units qHs, Humalog 12 units w/ meals, SSI, accucheck ACHS.  Limit Neurontin   -Anemia of chronic dx:    Hgb on admission (7.2) - likely dilutional component .No signs of active bleeding.  Pt on EPO  -CAD: cont ASA, Plavix, Statin, Isosorbide,  Ranexa  _HTN:  Cont Metoprolol 25 BID, Lisinopril 5mg, Isosorbide 30mg  -Hypothyroidism:  Cont L-thyroxine    Signature    This document has been electronically signed by Radha Argueta MD on April 15, 2022 19:23 CDT

## 2022-04-16 NOTE — PLAN OF CARE
Goal Outcome Evaluation:  Plan of Care Reviewed With: patient        Progress: improving  Outcome Evaluation: pt recieved dialysis today. 5L of fluid removed. pt very drowsy. FSBS running in 400 before dinner, MD aware.

## 2022-04-16 NOTE — OUTREACH NOTE
Medical Week 3 Survey    Flowsheet Row Responses   [facility] patient discharged from? [place]   Does the patient have one of the following disease processes/diagnoses(primary or secondary)? Other   Week 3 attempt successful? No   Revoke Readmitted          BIRGIT LEE - Registered Nurse

## 2022-04-16 NOTE — H&P
HISTORY AND PHYSICAL  NAME: Yamileth Slater  : 1960  MRN: [id number]    DATE OF ADMISSION:  4/15/2022     DATE & TIME SEEN: 22 at 1354    PCP: Rianna Macias MD    CODE STATUS: Full code    CHIEF COMPLAINT:  Missed dialysis. Shortness of breath    HPI:  Yamileth Slater is a 62 y.o. female with a CMH of DM with autonomic neuropathy, COPD on  3 NL NC at home, ESRD on hemodialysis, hypothyroidism, HDL, HDL, GERD, HTN, anemia of chronic disease who presented to the ED complaining of shortness of breath and chest pain. Patient reports that she missed dialysis this morning because she felt that she was not treated right during her last session at the dialysis facility (one of the nurses did not want to bring her a stool to put her feet up during her dialysis).   In the ED, patient got an EKG which was significant for a new RBBB. BNP was elevated at 7506 (from 5421).  Troponin was WNL. CXR was significant for diffuse interstitial opacities likely to edema and right base opacity consistent with effusion and airspace consolidation. She was given morphine for the pain and one Duoneb treatment.  At the time of evaluation, she denied any chest pain or shortness of breath. Patient was admitted to receive dialysis tomorrow 2022.  Of note, on Monday, patient was transferring herself form her chair to bed and slowly fell down and hurt her left wrist. At that time the EMS who pick her up after the fall, and she states that he squeezed really hard. Left wrist XR is negative for fracture.     CONCURRENT MEDICAL HISTORY:  Past Medical History:   Diagnosis Date   • Acute blood loss anemia 2017    Likely due to gastric oozing at this time. - Dr. Duarte (GI) was consulted and has now signed off, will follow up outpatient - pill colonoscopy showed AVMs - continue to monitor   • Acute cystitis with hematuria 3/31/2021    1/13: IV Rocephin 1 gm q 24  : transitioned  to omnicef 300mg. Urine cultures  resulted and did not show growth. Omnicef discontinued as patient is asymptomatic   • Altered mental status 1/9/2022    - AMS on presentation - initial ABG pH 7.3, CO2 34 - Procal 0.29 - UA negative for acute cysitits -CTA head wnl  - Empiric Zosyn and Vancomycin -Lactate 2.5 on admission  - blood cultures no growth at 24 hours     • Anxiety    • CAD (coronary artery disease) 4/24/2021    S/P 3 stents 5/1/2021 for [facility] Continue ASA 81mg & Clopidogrel 75mg Continue Atorvastatin 40mg   • Carotid artery stenosis    • Chronic obstructive lung disease (HCC)    • CKD (chronic kidney disease) stage 4, GFR 15-29 ml/min ([facility])    • CKD (chronic kidney disease), symptom management only, stage 5 ([facility]) 10/5/2020    Results from last 7 days Lab Units 12/15/21 0548 12/14/21 1323 12/14/21 0916 CREATININE mg/dL 3.92* 3.21* 3.32*  Baseline creatinine 2-3 GFR 13-25 GFR 15 Dialysis MWF, sees Dr. Lauren Nephrology consult,, appreciate recommendations Continue Bumex 1mg bid daily Holding Bumex 2mg 4 times a week   • Colonic polyp    • Coronary arteriosclerosis    • Diabetes mellitus ([facility])    • Diabetic neuropathy ([facility])    • Ear pain, right 10/18/2021    - canal trauma due to patient scratching and DMT2 - added cortisporin ear drops   • Elevated troponin 10/12/2021    -most likely from CKD -Trending down -Neg chest pain   • GERD (gastroesophageal reflux disease)    • GI bleed 5/13/2021    - GI will follow up outpatient - Protonix 40mg daily - Avoid medical DVT prophy and use mechanical at this time instead. - Continue to monitor - pill colonoscopy results showed AVMs   • History of transfusion    • Hypercholesterolemia    • Hypertension    • Hypomagnesemia 6/27/2021    Monitor and replace   • Morbid obesity ([facility])    • Nephrolithiasis    • Peripheral vascular disease ([facility])    • SIRS (systemic inflammatory response syndrome) ([facility]) 1/9/2022    Admission  - WBC 17.78   -   - RR 16 - 1/10: VSS/wnl - CXR - Mild pulm edema - Blood cultures  no growth at 24 hours  - Procalcitonin 0.29 - UA : glucose 1000, negative Leucocytes/nitrate - Empiric Zosyn and Vancomcyin    • Sleep apnea    • Substance abuse (HCC)    • Vitamin D deficiency        PAST SURGICAL HISTORY:  Past Surgical History:   Procedure Laterality Date   • CARDIAC CATHETERIZATION N/A 7/14/2020   • CARDIAC CATHETERIZATION N/A 4/23/2021    Procedure: Left Heart Cath;  Surgeon: Melba Romo MD;  Location: [facility] CATH INVASIVE LOCATION;  Service: Cardiology;  Laterality: N/A;   • CARDIAC CATHETERIZATION N/A 4/30/2021    Procedure: Percutaneous Coronary Intervention;  Surgeon: Russell Voss MD;  Location: [facility] CATH INVASIVE LOCATION;  Service: Cardiovascular;  Laterality: N/A;   • CARDIAC CATHETERIZATION N/A 4/30/2021    Procedure: Stent NIKKI coronary;  Surgeon: Russell Voss MD;  Location: [facility] CATH INVASIVE LOCATION;  Service: Cardiovascular;  Laterality: N/A;   • CARDIAC CATHETERIZATION Left 11/13/2021    Procedure: Left Heart Cath;  Surgeon: Niall Rios MD;  Location: [facility] CATH INVASIVE LOCATION;  Service: Cardiology;  Laterality: Left;   • CAROTID STENT Left    • COLONOSCOPY     • COLONOSCOPY N/A 5/14/2021    Procedure: COLONOSCOPY;  Surgeon: Mingo Duarte MD;  Location: [facility] ENDOSCOPY;  Service: Gastroenterology;  Laterality: N/A;   • CORONARY ARTERY BYPASS GRAFT N/A 2013    CABG X 3   • CYSTOSCOPY BLADDER STONE LITHOTRIPSY Bilateral    • ENDOSCOPY N/A 4/12/2021    Procedure: ESOPHAGOGASTRODUODENOSCOPY;  Surgeon: Mingo Duarte MD;  Location: [facility] ENDOSCOPY;  Service: Gastroenterology;  Laterality: N/A;   • ENDOSCOPY N/A 5/14/2021    Procedure: ESOPHAGOGASTRODUODENOSCOPY;  Surgeon: Mingo Duarte MD;  Location: [facility] ENDOSCOPY;  Service: Gastroenterology;  Laterality: N/A;   • ENDOSCOPY N/A 1/28/2022    Procedure: ESOPHAGOGASTRODUODENOSCOPY;  Surgeon: Mingo Duarte MD;  Location: [facility] ENDOSCOPY;  Service: Gastroenterology;   Laterality: N/A;   • INTERVENTIONAL RADIOLOGY PROCEDURE N/A 10/21/2021    Procedure: tunneled central venous catheter placement;  Surgeon: Donnie Robles MD;  Location: [facility] ANGIO INVASIVE LOCATION;  Service: Interventional Radiology;  Laterality: N/A;   • INTERVENTIONAL RADIOLOGY PROCEDURE N/A 2022    Procedure: tunneled central venous catheter placement;  Surgeon: Donnie Robles MD;  Location: [facility] ANGIO INVASIVE LOCATION;  Service: Interventional Radiology;  Laterality: N/A;       FAMILY HISTORY:  Family History   Problem Relation Age of Onset   • Heart disease Mother    • Lung cancer Mother    • Heart disease Father    • Heart attack Father    • Diabetes Father    • Heart disease Half-Sister         Dad's side   • Heart disease Brother    • No Known Problems Sister    • No Known Problems Sister    • No Known Problems Sister    • No Known Problems Sister    • No Known Problems Sister    • Pancreatic cancer Half-Sister         Dad's side   • No Known Problems Brother    • No Known Problems Brother    • Heart attack Half-Brother    • Heart attack Half-Brother    • No Known Problems Maternal Grandmother    • No Known Problems Maternal Grandfather    • No Known Problems Paternal Grandmother    • No Known Problems Paternal Grandfather         SOCIAL HISTORY:  Social History     Socioeconomic History   • Marital status:      Spouse name: wesley dumont   Tobacco Use   • Smoking status: Former Smoker     Packs/day: 0.25     Years: 46.00     Pack years: 11.50     Types: Cigarettes     Start date: 1999     Quit date: 2/15/2022     Years since quittin.1   • Smokeless tobacco: Never Used   • Tobacco comment: only smoking 5 a day - quit 2021   Substance and Sexual Activity   • Alcohol use: No   • Drug use: Not Currently     Types: LSD, Marijuana, Methamphetamines   • Sexual activity: Not Currently       HOME MEDICATIONS:  Prior to Admission medications    Medication Sig Start  Date End Date Taking? Authorizing Provider   acetaminophen (Tylenol 8 Hour) 650 MG 8 hr tablet Take 1 tablet by mouth Every 8 (Eight) Hours As Needed for Mild Pain . 2/1/22  Yes Blake Burris MD   albuterol (PROVENTIL) (2.5 MG/3ML) 0.083% nebulizer solution Inhale the contents of 1 vial by nebulization Every 4 (Four) Hours As Needed for Wheezing. 10/28/21  Yes Haily Mcneil MD   albuterol sulfate  (90 Base) MCG/ACT inhaler Inhale 2 puffs Every 4 (Four) Hours As Needed for Wheezing. 3/26/21  Yes Nirmal Calvillo MD   aspirin (aspirin) 81 MG EC tablet Take 1 tablet by mouth Daily. 5/1/21  Yes Jr Jaime Estrada MD   atorvastatin (LIPITOR) 20 MG tablet Take 1 tablet by mouth every night at bedtime. 3/3/22  Yes Umesh Liz MD   Blood Glucose Monitoring Suppl ([employer] Blood Glucose Meter) w/Device kit 1 each 3 (Three) Times a Day. 10/9/20  Yes Nirmal Calvillo MD   bumetanide (BUMEX) 2 MG tablet Take 1 tablet by mouth 4 (Four) Times a Week. Take on non-hemodialysis days. 3/17/22  Yes Alejandra Altamirano MD   Capsaicin 0.035 % cream Apply 3 g topically 3 (Three) Times a Day As Needed (ankle pain). 3/3/22  Yes Umesh Liz MD   clopidogrel (PLAVIX) 75 MG tablet Take 1 tablet by mouth Daily. 3/26/21  Yes Nirmal Calvillo MD   Continuous Blood Gluc  (FreeStyle Jade 2 Kulm) device 1 each Continuous. 3/31/21  Yes Nirmal Calvillo MD   Continuous Blood Gluc Sensor (FreeStyle Jade 2 Sensor) misc 1 each Every 14 (Fourteen) Days. 2/1/22  Yes Blake Burris MD   cyclobenzaprine (FLEXERIL) 5 MG tablet Take 10 mg by mouth.   Yes Caio Thompson MD   Diclofenac Sodium (VOLTAREN) 1 % gel gel Apply 4 g topically to the appropriate area as directed 4 (Four) Times a Day As Needed (Ankle pain). 3/3/22  Yes Umesh Liz MD   DULoxetine (CYMBALTA) 20 MG capsule Take 1 capsule by mouth Daily for 30 days. 3/3/22 4/2/22 Yes Umesh Liz MD   Easy Touch Insulin  "Syringe 30G X 5/16\" 0.5 ML misc USE AS DIRECTED WITH LEVEMIR 12/1/21  Yes Caio Thompson MD   EASY TOUCH PEN NEEDLES 31G X 8 MM misc  8/7/20  Yes Caio Thompson MD   gabapentin (NEURONTIN) 800 MG tablet Take 1 tablet by mouth 3 (Three) Times a Day. 3/24/22  Yes Alejandra Altamirano MD   glucose blood test strip Use as instructed 10/9/20  Yes Nirmal Calvillo MD   insulin detemir (LEVEMIR) 100 UNIT/ML injection Inject 20 Units under the skin into the appropriate area as directed Every Night.  Patient taking differently: Inject 30 Units under the skin into the appropriate area as directed 2 (Two) Times a Day. 3/17/22  Yes Alejandra Altamirano MD   Insulin Lispro, 1 Unit Dial, (HUMALOG) 100 UNIT/ML solution pen-injector Inject 12 Units under the skin into the appropriate area as directed 3 (Three) Times a Day With Meals. 3/17/22  Yes Alejandra Altamirano MD   Insulin Pen Needle (NovoFine) 30G X 8 MM misc As directed 4 times daily 3/26/21  Yes Nirmal Calvillo MD   Insulin Pen Needle 31G X 8 MM misc Use to inject insulin 4 (Four) Times a Day as directed. 10/28/21  Yes Haily Mcneil MD   ipratropium-albuterol (DUO-NEB) 0.5-2.5 mg/3 ml nebulizer Take 3 mL by nebulization 4 (Four) Times a Day. 3/22/17  Yes Caio Thompson MD   isosorbide mononitrate (IMDUR) 30 MG 24 hr tablet Take 1 tablet by mouth Every Morning. 3/3/22  Yes Umesh Liz MD   lidocaine (LIDODERM) 5 % APPLY TO THE AFFECTED AREA(S) TOPICALLY TWO TIMES A DAY. REMOVE NIGHTLY 10/8/21  Yes Blake Burris MD   lisinopril (PRINIVIL,ZESTRIL) 5 MG tablet Take 1 tablet by mouth Daily. 3/3/22  Yes Umesh Liz MD   metoprolol tartrate (LOPRESSOR) 25 MG tablet Take 1 tablet by mouth Every 12 (Twelve) Hours. 1/19/22 5/19/22 Yes Paulina Solano MD   montelukast (SINGULAIR) 10 MG tablet Take 1 tablet by mouth Every Night. 3/26/21  Yes Nirmal Calvillo MD   muscle rub (BenGay) 10-15 % cream cream Apply 1 application " topically to the appropriate area as directed 4 (Four) Times a Day As Needed for buttock pain. 1/19/22  Yes Paulina Solano MD   O2 (OXYGEN) Inhale 3 L/min Continuous. 1/1/21  Yes Caio Thompson MD   ondansetron ODT (Zofran ODT) 4 MG disintegrating tablet Place 1 tablet on the tongue Every 8 (Eight) Hours As Needed for Nausea or Vomiting. 3/26/21  Yes Nirmal Calvillo MD   oxybutynin XL (DITROPAN-XL) 5 MG 24 hr tablet Take 1 tablet by mouth Daily. 3/26/21  Yes Nirmal Calvillo MD   pantoprazole (PROTONIX) 40 MG EC tablet Take 1 tablet by mouth Every Night. 3/26/21  Yes Nirmal Calvillo MD   ranolazine (RANEXA) 500 MG 12 hr tablet Take 1 tablet by mouth Every 12 (Twelve) Hours. 3/18/22  Yes Blake Burris MD   sevelamer (RENVELA) 800 MG tablet Take 800 mg by mouth 3 (Three) Times a Day With Meals. 1/26/22  Yes Caio Thompson MD   levothyroxine (SYNTHROID, LEVOTHROID) 25 MCG tablet Take 25 mcg by mouth Daily.  Patient not taking: No sig reported 9/1/21   aYdira Delarosa MD   nitroglycerin (NITROSTAT) 0.4 MG SL tablet Place 0.4 mg under the tongue Every 5 (Five) Minutes As Needed for Chest Pain (x 3 doses). 1/1/15   Caio Thompson MD   insulin aspart (novoLOG FLEXPEN) 100 UNIT/ML solution pen-injector sc pen Inject 30 Units under the skin into the appropriate area as directed 3 (Three) Times a Day With Meals. 2/1/22 3/17/22  Blake Burris MD       ALLERGIES:  Adhesive tape, Latex, Nsaids, and Other    REVIEW OF SYSTEMS  Review of Systems   Constitutional: Negative for appetite change, chills, fatigue and fever.   HENT: Negative for hearing loss, postnasal drip, rhinorrhea, sore throat and trouble swallowing.    Respiratory: Negative for shortness of breath and wheezing.    Cardiovascular: Negative for chest pain and palpitations.   Gastrointestinal: Negative for abdominal pain, constipation, diarrhea, nausea and vomiting.   Genitourinary: Negative for  dysuria.   Musculoskeletal: Positive for back pain.   Skin: Positive for rash.   Neurological: Negative for dizziness, syncope, facial asymmetry, light-headedness, numbness and headaches.       PHYSICAL EXAM:  Temp:  [95.9 °F (35.5 °C)-97.2 °F (36.2 °C)] 96.3 °F (35.7 °C)  Heart Rate:  [69-84] 76  Resp:  [16-22] 16  BP: (135-172)/(58-94) 166/79  Body mass index is 56.15 kg/m².  Physical Exam  Vitals reviewed.   Constitutional:       General: She is not in acute distress.     Appearance: She is obese. She is not ill-appearing or toxic-appearing.   HENT:      Head: Normocephalic and atraumatic.      Right Ear: Tympanic membrane and external ear normal.      Left Ear: Tympanic membrane and external ear normal.      Ears:      Comments: Cerumen present on left ear     Mouth/Throat:      Mouth: Mucous membranes are moist.      Pharynx: No pharyngeal swelling or posterior oropharyngeal erythema.   Eyes:      Extraocular Movements: Extraocular movements intact.      Conjunctiva/sclera: Conjunctivae normal.   Cardiovascular:      Rate and Rhythm: Normal rate and regular rhythm.   Pulmonary:      Effort: Pulmonary effort is normal.      Breath sounds: Examination of the right-lower field reveals rales. Rales present. No wheezing.   Chest:      Chest wall: Tenderness present.   Abdominal:      General: Bowel sounds are normal.      Palpations: Abdomen is soft.      Tenderness: There is no abdominal tenderness.   Musculoskeletal:      Right lower leg: Edema present.      Left lower leg: Edema present.      Comments: Mild lower extremity edema   Neurological:      Mental Status: She is alert and oriented to person, place, and time.         DIAGNOSTIC DATA:   Lab Results (last 24 hours)     Procedure Component Value Units Date/Time    Blood Culture - Blood, Arm, Left [[id number]]  (Normal) Collected: 04/15/22 1613    Specimen: Blood from Arm, Left Updated: 04/16/22 1634     Blood Culture No growth at 24 hours    Blood Culture -  Blood, Arm, Left [[id number]]  (Normal) Collected: 04/15/22 1538    Specimen: Blood from Arm, Left Updated: 04/16/22 1548     Blood Culture No growth at 24 hours    POC Glucose Once [[id number]]  (Abnormal) Collected: 04/16/22 1055    Specimen: Blood Updated: 04/16/22 1113     Glucose 330 mg/dL      Comment: RN NotifiedOperator: [id number] JUSTYN NATHANMeter ID: [id number]       POC Glucose Once [[id number]]  (Abnormal) Collected: 04/16/22 0723    Specimen: Blood Updated: 04/16/22 0746     Glucose 362 mg/dL      Comment: RN NotifiedOperator: [id number] JUSTYN NATHANMeter ID: [id number]       Comprehensive Metabolic Panel [[id number]]  (Abnormal) Collected: 04/16/22 0604    Specimen: Blood Updated: 04/16/22 0715     Glucose 321 mg/dL      BUN 57 mg/dL      Creatinine 3.55 mg/dL      Sodium 129 mmol/L      Potassium 5.0 mmol/L      Chloride 92 mmol/L      CO2 24.0 mmol/L      Calcium 8.5 mg/dL      Total Protein 7.5 g/dL      Albumin 3.50 g/dL      ALT (SGPT) 8 U/L      AST (SGOT) 10 U/L      Alkaline Phosphatase 223 U/L      Total Bilirubin 0.4 mg/dL      Globulin 4.0 gm/dL      A/G Ratio 0.9 g/dL      BUN/Creatinine Ratio 16.1     Anion Gap 13.0 mmol/L      eGFR 13.9 mL/min/1.73      Comment: <15 Indicative of kidney failure       Narrative:      GFR Normal >60  Chronic Kidney Disease <60  Kidney Failure <15      CBC & Differential [[id number]]  (Abnormal) Collected: 04/16/22 0604    Specimen: Blood Updated: 04/16/22 0658    Narrative:      The following orders were created for panel order CBC & Differential.  Procedure                               Abnormality         Status                     ---------                               -----------         ------                     CBC Auto Differential[[id number]]        Abnormal            Final result                 Please view results for these tests on the individual orders.    CBC Auto Differential [[id number]]  (Abnormal) Collected: 04/16/22 0604    Specimen:  "Blood Updated: 04/16/22 0658     WBC 8.58 10*3/mm3      RBC 2.68 10*6/mm3      Hemoglobin 7.4 g/dL      Hematocrit 24.1 %      MCV 89.9 fL      MCH 27.6 pg      MCHC 30.7 g/dL      RDW 16.6 %      RDW-SD 53.8 fl      MPV 9.3 fL      Platelets 267 10*3/mm3      Neutrophil % 90.2 %      Lymphocyte % 6.3 %      Monocyte % 1.6 %      Eosinophil % 0.0 %      Basophil % 0.2 %      Immature Grans % 1.7 %      Neutrophils, Absolute 7.73 10*3/mm3      Lymphocytes, Absolute 0.54 10*3/mm3      Monocytes, Absolute 0.14 10*3/mm3      Eosinophils, Absolute 0.00 10*3/mm3      Basophils, Absolute 0.02 10*3/mm3      Immature Grans, Absolute 0.15 10*3/mm3      nRBC 0.2 /100 WBC     POC Glucose Once [[id number]]  (Abnormal) Collected: 04/15/22 1911    Specimen: Blood Updated: 04/15/22 1940     Glucose 207 mg/dL      Comment: RN NotifiedOperator: [id number] LYNNE Elias ID: [id number]       Procalcitonin [[id number]]  (Abnormal) Collected: 04/15/22 1340    Specimen: Blood Updated: 04/15/22 1906     Procalcitonin 0.27 ng/mL     Narrative:      As a Marker for Sepsis (Non-Neonates):    1. <0.5 ng/mL represents a low risk of severe sepsis and/or septic shock.  2. >2 ng/mL represents a high risk of severe sepsis and/or septic shock.    As a Marker for Lower Respiratory Tract Infections that require antibiotic therapy:    PCT on Admission    Antibiotic Therapy       6-12 Hrs later    >0.5                Strongly Recommended  >0.25 - <0.5        Recommended   0.1 - 0.25          Discouraged              Remeasure/reassess PCT  <0.1                Strongly Discouraged     Remeasure/reassess PCT    As 28 day mortality risk marker: \"Change in Procalcitonin Result\" (>80% or <=80%) if Day 0 (or Day 1) and Day 4 values are available. Refer to http://www.[facility]-pct-calculator.com    Change in PCT <=80%  A decrease of PCT levels below or equal to 80% defines a positive change in PCT test result representing a higher risk for 28-day " all-cause mortality of patients diagnosed with severe sepsis for septic shock.    Change in PCT >80%  A decrease of PCT levels of more than 80% defines a negative change in PCT result representing a lower risk for 28-day all-cause mortality of patients diagnosed with severe sepsis or septic shock.       Extra Tubes [[id number]] Collected: 04/15/22 1340    Specimen: Blood, Venous Line Updated: 04/15/22 1747    Narrative:      The following orders were created for panel order Extra Tubes.  Procedure                               Abnormality         Status                     ---------                               -----------         ------                     Gold Top - SST[[id number]]                                   Final result               Richards Top[[id number]]                                         Final result               Light Blue Top[[id number]]                                   Final result                 Please view results for these tests on the individual orders.    Gray Top [[id number]] Collected: 04/15/22 1340    Specimen: Blood Updated: 04/15/22 1747     Extra Tube Hold for add-ons.     Comment: Auto resulted.              Imaging Results (Last 24 Hours)     ** No results found for the last 24 hours. **            I reviewed the patient's new clinical results.    ASSESSMENT AND PLAN: This is a 62 y.o. female with:    Active Hospital Problems    Diagnosis  POA   • **Admission for dialysis ([facility]) [Z99.2]  Not Applicable   • ESRD on hemodialysis ([facility]) [N18.6, Z99.2]  Not Applicable   • Type 2 diabetes mellitus with autonomic neuropathy ([facility]) [E11.43]  Unknown   • Anemia due to chronic kidney disease, on chronic dialysis ([facility]) [N18.6, D63.1, Z99.2]  Not Applicable   • Hypothyroidism [E03.9]  Yes   • COPD (chronic obstructive pulmonary disease) ([facility]) [J44.9]  Yes   • Mixed hyperlipidemia [E78.2]  Yes   • GERD (gastroesophageal reflux disease) [K21.9]  Yes   • Hypertension [I10]  Yes     #Admission for  Dialysis  - Patient receives dialysis on M/W/F  - Non- compliant  - Missed dialysis this morning 4/15/2022  - Nephrology consulted for HD  - Patient given 80 mg of lasix    #ESRD on HD  - Non compliant  - Creatinine on admission is 3.34   - Baseline Cr around 3  - Continue Epogen  - Bumex hold during admission    #Type 2 DM with autonomic neuropathy  - Continue Levemir 20 units at night  - SSI  - Glucose check QID  - Continue gabapentin   - Duloxetine 20 mg continued    #HTN  - Continue home lisinopril 5 mg, metoprolol 25 mg BID    #CHF  - Continue Imdur 30 mg daily and Ranexa 500 mg BID    #Acute anemia on CKD  - Current HB 7.2. Asymptomatic  - Monitor H/H and symptoms    #Hypothyrodism  - Patient reports not taking levothyroxine, restarted on admission    #COPD  - Dependent on 3 L NC  - Maintain strict sats between 88-92%  - Continue home meds of duoned Q6H, montelukast 10 mg daily    #Mixed hyperlipidemia   - Continue home atorvastatin 20 mg    #GERD  -Continue Protonix 40 mg    #CAD    - Continue plavix 75 mg and aspirin 81 mg      DVT prophylaxis: Heparin     Yamileth Slater and I have discussed pain goals for this hospitalization after reviewing her current clinical condition, medical history and prior pain experiences.  The goal is to keep the pain level at a 2/10.  To help achieve this, I plan to prescribe NSAIDs (if medically appropriate) and consider opoid medications if needed.     JULIAN # Reviewed using Epic PDMP  reviewed and consistent with patient reported medications.    Expected Length of Stay: 1-2 days     I discussed the patient's findings and my recommendations with patient.     Radha Argueta MD is the attending on record at time of admission, She is aware of the patient's status and agrees with the above history and physical.              This document has been electronically signed by Rianna Macias MD on April 16, 2022 16:59 CDT     Part of this note may be an electronic transcription  or translation of spoken language to printed text using the Dragon Dictation System.

## 2022-04-16 NOTE — PROGRESS NOTES
FAMILY MEDICINE DAILY PROGRESS NOTE    NAME: Yamileth Slater  : 1960  MRN: [id number]      LOS: 0 days     PROVIDER OF SERVICE: Rianna Macias MD    Chief Complaint: Admission for dialysis ([facility])    Subjective:   Patient Seen At: 0815    Interval History:  History taken from: patient chart  On the morning of 22 patient was feeling tired because she could not have a good rest at night. Currently denies any chest pain or shortness of breath. She is receiving dialysis at the moment. No acute concerns this morning from the patient.  Of note patient had an episode of AMS, which according to patient she always have when admitted. After, we rounded with the attending, Dr. Argueta, patient was asked if she had any medications with her and let us check her bag. She brought with her her gabapentin and omeprazole. She also had a bottle of xanaflex prescribed to another person.       Review of Systems:   Review of Systems   Constitutional: Negative for fatigue and unexpected weight change.   Respiratory: Negative for chest tightness and shortness of breath.    Cardiovascular: Negative for chest pain and palpitations.   Gastrointestinal: Negative for abdominal pain, constipation, diarrhea and vomiting.   Endocrine: Negative for cold intolerance, heat intolerance, polydipsia and polyuria.   Musculoskeletal: Negative for back pain, myalgias and neck pain.   Skin: Negative for rash.   Neurological: Negative for dizziness, weakness, light-headedness, numbness and headaches.       Objective:     Vital Signs  Temp:  [95.9 °F (35.5 °C)-97.2 °F (36.2 °C)] 96.3 °F (35.7 °C)  Heart Rate:  [69-84] 76  Resp:  [16-22] 16  BP: (135-172)/(58-94) 166/79  Body mass index is 56.15 kg/m².    Physical Exam  Physical Exam  Vitals reviewed.   Constitutional:       Comments: Patient is more tired than usual.    HENT:      Head: Normocephalic and atraumatic.      Mouth/Throat:      Pharynx: Oropharynx is clear.   Eyes:       Conjunctiva/sclera: Conjunctivae normal.   Pulmonary:      Effort: Pulmonary effort is normal.      Breath sounds: Normal breath sounds. No wheezing or rhonchi.      Comments: On 3 L NC  Abdominal:      Palpations: Abdomen is soft.      Tenderness: There is no abdominal tenderness.   Skin:     General: Skin is warm and dry.   Neurological:      Mental Status: Mental status is at baseline.         Medication Review    Current Facility-Administered Medications:   •  acetaminophen (TYLENOL) tablet 650 mg, 650 mg, Oral, Q4H PRN **OR** acetaminophen (TYLENOL) 160 MG/5ML solution 650 mg, 650 mg, Oral, Q4H PRN **OR** acetaminophen (TYLENOL) suppository 650 mg, 650 mg, Rectal, Q4H PRN, Rianna Macias MD  •  albumin human 25 % IV SOLN 12.5 g, 12.5 g, Intravenous, PRN, Ace Lauren MD  •  albuterol (PROVENTIL) nebulizer solution 0.083% 2.5 mg/3mL, 2.5 mg, Nebulization, Q4H PRN, Rianna Macias MD  •  aspirin EC tablet 81 mg, 81 mg, Oral, Daily, Rianna Macias MD, 81 mg at 04/16/22 1242  •  atorvastatin (LIPITOR) tablet 20 mg, 20 mg, Oral, Daily, Rianna Macias MD, 20 mg at 04/16/22 1242  •  sennosides-docusate (PERICOLACE) 8.6-50 MG per tablet 2 tablet, 2 tablet, Oral, BID, 2 tablet at 04/16/22 1242 **AND** polyethylene glycol (MIRALAX) packet 17 g, 17 g, Oral, Daily PRN **AND** bisacodyl (DULCOLAX) EC tablet 5 mg, 5 mg, Oral, Daily PRN **AND** bisacodyl (DULCOLAX) suppository 10 mg, 10 mg, Rectal, Daily PRN, Rianna Macias MD  •  clopidogrel (PLAVIX) tablet 75 mg, 75 mg, Oral, Daily, Rianna Macias MD, 75 mg at 04/16/22 1258  •  dextrose (D50W) (25 g/50 mL) IV injection 25 g, 25 g, Intravenous, Q15 Min PRN, Rianna Macias MD  •  dextrose (GLUTOSE) oral gel 15 g, 15 g, Oral, Q15 Min PRN, Rianna Macias MD  •  DULoxetine (CYMBALTA) DR capsule 20 mg, 20 mg, Oral, Daily, Rianna Macias MD, 20 mg at 04/16/22 1243  •  gabapentin (NEURONTIN) capsule 100 mg, 100 mg, Oral, Q8H, Radha Argueta MD, 100 mg at  04/15/22 2100  •  glucagon (human recombinant) (GLUCAGEN DIAGNOSTIC) injection 1 mg, 1 mg, Intramuscular, Q15 Min PRN, Rianna Macias MD  •  heparin (porcine) 5000 UNIT/ML injection 5,000 Units, 5,000 Units, Subcutaneous, Q8H, Rianna Macias MD, 5,000 Units at 04/16/22 1333  •  heparin (porcine) injection 4,000 Units, 4,000 Units, Intracatheter, PRN, Ace Lauren MD, 4,000 Units at 04/16/22 0821  •  HYDROcodone-acetaminophen (NORCO) 5-325 MG per tablet 1 tablet, 1 tablet, Oral, Q4H PRN, Rianna Macias MD  •  insulin aspart (novoLOG) injection 0-24 Units, 0-24 Units, Subcutaneous, TID AC, Rianna Macias MD, 16 Units at 04/16/22 1126  •  insulin detemir (LEVEMIR) injection 20 Units, 20 Units, Subcutaneous, Nightly, Rianna Macias MD, 20 Units at 04/15/22 2101  •  ipratropium-albuterol (DUO-NEB) nebulizer solution 3 mL, 3 mL, Nebulization, 4x Daily - RT, Rianna Macias MD, 3 mL at 04/16/22 1521  •  isosorbide mononitrate (IMDUR) 24 hr tablet 30 mg, 30 mg, Oral, QAM, Rianna Macias MD  •  levothyroxine (SYNTHROID, LEVOTHROID) tablet 25 mcg, 25 mcg, Oral, Q AM, Rianna Macias MD  •  lisinopril (PRINIVIL,ZESTRIL) tablet 5 mg, 5 mg, Oral, Daily, Rianna Macias MD, 5 mg at 04/16/22 1243  •  metoprolol tartrate (LOPRESSOR) tablet 25 mg, 25 mg, Oral, Q12H, Rianna Macias MD, 25 mg at 04/16/22 1243  •  montelukast (SINGULAIR) tablet 10 mg, 10 mg, Oral, Nightly, Rianna Macias MD, 10 mg at 04/15/22 2059  •  nystatin (MYCOSTATIN) powder, , Topical, BID, Nestor Richards MD  •  ondansetron (ZOFRAN) tablet 4 mg, 4 mg, Oral, Q6H PRN, Rianna Macias MD  •  oxybutynin XL (DITROPAN-XL) 24 hr tablet 5 mg, 5 mg, Oral, Daily, Rianna Macias MD, 5 mg at 04/16/22 1243  •  pantoprazole (PROTONIX) EC tablet 40 mg, 40 mg, Oral, Nightly, Rianna Macias MD  •  ranolazine (RANEXA) 12 hr tablet 500 mg, 500 mg, Oral, Q12H, Rianna Macias MD, 500 mg at 04/16/22 1258  •  sevelamer (RENVELA) tablet 800 mg, 800 mg, Oral,  TID With Meals, Rianna Macias MD, 800 mg at 04/16/22 1242  •  sodium chloride 0.9 % flush 10 mL, 10 mL, Intravenous, PRN, Rianna Macias MD  •  sodium chloride 0.9 % flush 10 mL, 10 mL, Intravenous, Q12H, Rianna Macias MD, 10 mL at 04/15/22 2059  •  sodium chloride 0.9 % flush 10 mL, 10 mL, Intravenous, PRN, Rianna Macias MD     Diagnostic Data    Lab Results (last 24 hours)     Procedure Component Value Units Date/Time    Blood Culture - Blood, Arm, Left [[id number]]  (Normal) Collected: 04/15/22 1613    Specimen: Blood from Arm, Left Updated: 04/16/22 1634     Blood Culture No growth at 24 hours    Blood Culture - Blood, Arm, Left [[id number]]  (Normal) Collected: 04/15/22 1538    Specimen: Blood from Arm, Left Updated: 04/16/22 1548     Blood Culture No growth at 24 hours    POC Glucose Once [[id number]]  (Abnormal) Collected: 04/16/22 1055    Specimen: Blood Updated: 04/16/22 1113     Glucose 330 mg/dL      Comment: RN NotifiedOperator: [id number] JUSTYN NATHANMeter ID: [id number]       POC Glucose Once [[id number]]  (Abnormal) Collected: 04/16/22 0723    Specimen: Blood Updated: 04/16/22 0746     Glucose 362 mg/dL      Comment: RN NotifiedOperator: [id number] JUSTYN NATHANMeter ID: [id number]       Comprehensive Metabolic Panel [[id number]]  (Abnormal) Collected: 04/16/22 0604    Specimen: Blood Updated: 04/16/22 0715     Glucose 321 mg/dL      BUN 57 mg/dL      Creatinine 3.55 mg/dL      Sodium 129 mmol/L      Potassium 5.0 mmol/L      Chloride 92 mmol/L      CO2 24.0 mmol/L      Calcium 8.5 mg/dL      Total Protein 7.5 g/dL      Albumin 3.50 g/dL      ALT (SGPT) 8 U/L      AST (SGOT) 10 U/L      Alkaline Phosphatase 223 U/L      Total Bilirubin 0.4 mg/dL      Globulin 4.0 gm/dL      A/G Ratio 0.9 g/dL      BUN/Creatinine Ratio 16.1     Anion Gap 13.0 mmol/L      eGFR 13.9 mL/min/1.73      Comment: <15 Indicative of kidney failure       Narrative:      GFR Normal >60  Chronic Kidney Disease  <60  Kidney Failure <15      CBC & Differential [[id number]]  (Abnormal) Collected: 04/16/22 0604    Specimen: Blood Updated: 04/16/22 0658    Narrative:      The following orders were created for panel order CBC & Differential.  Procedure                               Abnormality         Status                     ---------                               -----------         ------                     CBC Auto Differential[[id number]]        Abnormal            Final result                 Please view results for these tests on the individual orders.    CBC Auto Differential [[id number]]  (Abnormal) Collected: 04/16/22 0604    Specimen: Blood Updated: 04/16/22 0658     WBC 8.58 10*3/mm3      RBC 2.68 10*6/mm3      Hemoglobin 7.4 g/dL      Hematocrit 24.1 %      MCV 89.9 fL      MCH 27.6 pg      MCHC 30.7 g/dL      RDW 16.6 %      RDW-SD 53.8 fl      MPV 9.3 fL      Platelets 267 10*3/mm3      Neutrophil % 90.2 %      Lymphocyte % 6.3 %      Monocyte % 1.6 %      Eosinophil % 0.0 %      Basophil % 0.2 %      Immature Grans % 1.7 %      Neutrophils, Absolute 7.73 10*3/mm3      Lymphocytes, Absolute 0.54 10*3/mm3      Monocytes, Absolute 0.14 10*3/mm3      Eosinophils, Absolute 0.00 10*3/mm3      Basophils, Absolute 0.02 10*3/mm3      Immature Grans, Absolute 0.15 10*3/mm3      nRBC 0.2 /100 WBC     POC Glucose Once [[id number]]  (Abnormal) Collected: 04/15/22 1911    Specimen: Blood Updated: 04/15/22 1940     Glucose 207 mg/dL      Comment: RN NotifiedOperator: [id number] [facility] BRANDOdamari ID: [id number]       Procalcitonin [[id number]]  (Abnormal) Collected: 04/15/22 1340    Specimen: Blood Updated: 04/15/22 1906     Procalcitonin 0.27 ng/mL     Narrative:      As a Marker for Sepsis (Non-Neonates):    1. <0.5 ng/mL represents a low risk of severe sepsis and/or septic shock.  2. >2 ng/mL represents a high risk of severe sepsis and/or septic shock.    As a Marker for Lower Respiratory Tract Infections that require  "antibiotic therapy:    PCT on Admission    Antibiotic Therapy       6-12 Hrs later    >0.5                Strongly Recommended  >0.25 - <0.5        Recommended   0.1 - 0.25          Discouraged              Remeasure/reassess PCT  <0.1                Strongly Discouraged     Remeasure/reassess PCT    As 28 day mortality risk marker: \"Change in Procalcitonin Result\" (>80% or <=80%) if Day 0 (or Day 1) and Day 4 values are available. Refer to http://www.[facility]-pct-calculator.com    Change in PCT <=80%  A decrease of PCT levels below or equal to 80% defines a positive change in PCT test result representing a higher risk for 28-day all-cause mortality of patients diagnosed with severe sepsis for septic shock.    Change in PCT >80%  A decrease of PCT levels of more than 80% defines a negative change in PCT result representing a lower risk for 28-day all-cause mortality of patients diagnosed with severe sepsis or septic shock.       Extra Tubes [[id number]] Collected: 04/15/22 1340    Specimen: Blood, Venous Line Updated: 04/15/22 1747    Narrative:      The following orders were created for panel order Extra Tubes.  Procedure                               Abnormality         Status                     ---------                               -----------         ------                     Gold Top - SST[[id number]]                                   Final result               Richards Top[[id number]]                                         Final result               Light Blue Top[[id number]]                                   Final result                 Please view results for these tests on the individual orders.    Gray Top [[id number]] Collected: 04/15/22 1340    Specimen: Blood Updated: 04/15/22 1747     Extra Tube Hold for add-ons.     Comment: Auto resulted.               I reviewed the patient's new clinical results.    Assessment/Plan:     Active Hospital Problems    Diagnosis  POA   • **Admission for dialysis (HCC) [Z99.2] "  Not Applicable   • ESRD on hemodialysis ([facility]) [N18.6, Z99.2]  Not Applicable   • Type 2 diabetes mellitus with autonomic neuropathy ([facility]) [E11.43]  Unknown   • Anemia due to chronic kidney disease, on chronic dialysis ([facility]) [N18.6, D63.1, Z99.2]  Not Applicable   • Hypothyroidism [E03.9]  Yes   • COPD (chronic obstructive pulmonary disease) ([facility]) [J44.9]  Yes   • Mixed hyperlipidemia [E78.2]  Yes   • GERD (gastroesophageal reflux disease) [K21.9]  Yes   • Hypertension [I10]  Yes     #Admission for Dialysis  - Patient receives dialysis on M/W/F  - Non- compliant  - Missed dialysis on 4/15/2022  - Nephrology consulted for HD  - Patient given 80 mg of lasix  - Received dialysis this morning      #ESRD on HD  - Non compliant  - Creatinine on admission is 3.34 > 3.55  - Baseline Cr around 3  - Continue Epogen  - Bumex hold during admission     #Type 2 DM with autonomic neuropathy  - Continue Levemir 20 units at night, Humalog 12 units with meals  - SSI  - Glucose check QID  - Continue gabapentin   - Duloxetine 20 mg continued     #HTN  - Continue home lisinopril 5 mg, metoprolol 25 mg BID     #CHF  - Continue Imdur 30 mg daily and Ranexa 500 mg BID     #Acute anemia on CKD  - Current HB 7.2. Asymptomatic  - Monitor H/H and symptoms     #Hypothyrodism  - Patient reports not taking levothyroxine, restarted on admission     #COPD  - Dependent on 3 L NC  - Maintain strict sats between 88-92%  - Continue home meds of duoned Q6H, montelukast 10 mg daily     #Mixed hyperlipidemia   - Continue home atorvastatin 20 mg     #GERD  -Continue Protonix 40 mg     #CAD    - Continue plavix 75 mg and aspirin 81 mg    #Encelopathy  - Limit psychotropic meds       DVT prophylaxis:   Mechanical Order History:     None      Pharmalogical Order History:      Ordered     Dose Route Frequency Stop    04/16/22 0651  heparin (porcine) injection 4,000 Units         4,000 Units IK As Needed --    04/15/22 1706  heparin (porcine) 5000 UNIT/ML  injection 5,000 Units         5,000 Units SC Every 8 Hours Scheduled --               Code status is   Code Status and Medical Interventions:   Ordered at: 04/15/22 1700     Level Of Support Discussed With:    Patient     Code Status (Patient has no pulse and is not breathing):    CPR (Attempt to Resuscitate)     Medical Interventions (Patient has pulse or is breathing):    Full Support       Plan for disposition:Where: home and When:  Once stable       Time: 15 minutes            This document has been electronically signed by Rianna Macias MD on April 16, 2022 17:07 CDT       Part of this note may be an electronic transcription/translation of spoken language to printed text using the Dragon Dictation System.

## 2022-04-16 NOTE — CONSULTS
[facility]  [address] [place] [address]. [address]  T - [phone]  F - [phone]     Consultation         PATIENT  DEMOGRAPHICS   PATIENT NAME: Yamileth Slater                      PHYSICIAN: BRIDGETT Gagnon  : 1960  MRN: [id number]    Subjective   SUBJECTIVE   Referring Provider: Dr Macias  Reason for Consultation: ESRD HD dependent  History of present illness:    This is a 62-year-old female with a past medical history significant for COPD, CKD/ESRD, CAD, DM 2, hypertension, obesity, PVD who presented to the hospital with complaints of shortness of breath. Patient in on chronic home O2 and Triology home vent while sleeping. She missed her HD session on Friday. She now presents with fluid overload and increasing shortness of breath.Patient additionally reported prior fall at home for which she had xray to evaluate left wrist pain.  Nephrology is consulted today to help manage her ESRD and HD needs.     Past Medical History:   Diagnosis Date   • Acute blood loss anemia 2017    Likely due to gastric oozing at this time. - Dr. Duarte (GI) was consulted and has now signed off, will follow up outpatient - pill colonoscopy showed AVMs - continue to monitor   • Acute cystitis with hematuria 3/31/2021    1/13: IV Rocephin 1 gm q 24  : transitioned  to omnicef 300mg. Urine cultures resulted and did not show growth. Omnicef discontinued as patient is asymptomatic   • Altered mental status 2022    - AMS on presentation - initial ABG pH 7.3, CO2 34 - Procal 0.29 - UA negative for acute cysitits -CTA head wnl  - Empiric Zosyn and Vancomycin -Lactate 2.5 on admission  - blood cultures no growth at 24 hours     • Anxiety    • CAD (coronary artery disease) 2021    S/P 3 stents 2021 for [facility] Continue ASA 81mg & Clopidogrel 75mg Continue Atorvastatin 40mg   • Carotid artery stenosis    • Chronic obstructive lung disease (HCC)    • CKD (chronic kidney disease)  stage 4, GFR 15-29 ml/min ([facility])    • CKD (chronic kidney disease), symptom management only, stage 5 ([facility]) 10/5/2020    Results from last 7 days Lab Units 12/15/21 0548 12/14/21 1323 12/14/21 0916 CREATININE mg/dL 3.92* 3.21* 3.32*  Baseline creatinine 2-3 GFR 13-25 GFR 15 Dialysis MWF, sees Dr. Lauren Nephrology consult,, appreciate recommendations Continue Bumex 1mg bid daily Holding Bumex 2mg 4 times a week   • Colonic polyp    • Coronary arteriosclerosis    • Diabetes mellitus ([facility])    • Diabetic neuropathy ([facility])    • Ear pain, right 10/18/2021    - canal trauma due to patient scratching and DMT2 - added cortisporin ear drops   • Elevated troponin 10/12/2021    -most likely from CKD -Trending down -Neg chest pain   • GERD (gastroesophageal reflux disease)    • GI bleed 5/13/2021    - GI will follow up outpatient - Protonix 40mg daily - Avoid medical DVT prophy and use mechanical at this time instead. - Continue to monitor - pill colonoscopy results showed AVMs   • History of transfusion    • Hypercholesterolemia    • Hypertension    • Hypomagnesemia 6/27/2021    Monitor and replace   • Morbid obesity ([facility])    • Nephrolithiasis    • Peripheral vascular disease ([facility])    • SIRS (systemic inflammatory response syndrome) ([facility]) 1/9/2022    Admission  - WBC 17.78   -   - RR 16 - 1/10: VSS/wnl - CXR - Mild pulm edema - Blood cultures no growth at 24 hours  - Procalcitonin 0.29 - UA : glucose 1000, negative Leucocytes/nitrate - Empiric Zosyn and Vancomcyin    • Sleep apnea    • Substance abuse ([facility])    • Vitamin D deficiency      Past Surgical History:   Procedure Laterality Date   • CARDIAC CATHETERIZATION N/A 7/14/2020   • CARDIAC CATHETERIZATION N/A 4/23/2021    Procedure: Left Heart Cath;  Surgeon: Melba Romo MD;  Location: [facility] CATH INVASIVE LOCATION;  Service: Cardiology;  Laterality: N/A;   • CARDIAC CATHETERIZATION N/A 4/30/2021    Procedure: Percutaneous Coronary Intervention;  Surgeon: Shanika  Russell ESPARZA MD;  Location: [facility] CATH INVASIVE LOCATION;  Service: Cardiovascular;  Laterality: N/A;   • CARDIAC CATHETERIZATION N/A 4/30/2021    Procedure: Stent NIKKI coronary;  Surgeon: Russell Voss MD;  Location: [facility] CATH INVASIVE LOCATION;  Service: Cardiovascular;  Laterality: N/A;   • CARDIAC CATHETERIZATION Left 11/13/2021    Procedure: Left Heart Cath;  Surgeon: Niall Rios MD;  Location: [facility] CATH INVASIVE LOCATION;  Service: Cardiology;  Laterality: Left;   • CAROTID STENT Left    • COLONOSCOPY     • COLONOSCOPY N/A 5/14/2021    Procedure: COLONOSCOPY;  Surgeon: Mingo Duarte MD;  Location: [facility] ENDOSCOPY;  Service: Gastroenterology;  Laterality: N/A;   • CORONARY ARTERY BYPASS GRAFT N/A 2013    CABG X 3   • CYSTOSCOPY BLADDER STONE LITHOTRIPSY Bilateral    • ENDOSCOPY N/A 4/12/2021    Procedure: ESOPHAGOGASTRODUODENOSCOPY;  Surgeon: Mingo Duarte MD;  Location: [facility] ENDOSCOPY;  Service: Gastroenterology;  Laterality: N/A;   • ENDOSCOPY N/A 5/14/2021    Procedure: ESOPHAGOGASTRODUODENOSCOPY;  Surgeon: Mingo Duarte MD;  Location: [facility] ENDOSCOPY;  Service: Gastroenterology;  Laterality: N/A;   • ENDOSCOPY N/A 1/28/2022    Procedure: ESOPHAGOGASTRODUODENOSCOPY;  Surgeon: Mingo Duarte MD;  Location: [facility] ENDOSCOPY;  Service: Gastroenterology;  Laterality: N/A;   • INTERVENTIONAL RADIOLOGY PROCEDURE N/A 10/21/2021    Procedure: tunneled central venous catheter placement;  Surgeon: Donnie Robles MD;  Location: [facility] ANGIO INVASIVE LOCATION;  Service: Interventional Radiology;  Laterality: N/A;   • INTERVENTIONAL RADIOLOGY PROCEDURE N/A 1/27/2022    Procedure: tunneled central venous catheter placement;  Surgeon: Donnie Robles MD;  Location: [facility] ANGIO INVASIVE LOCATION;  Service: Interventional Radiology;  Laterality: N/A;     Family History   Problem Relation Age of Onset   • Heart disease Mother    • Lung cancer Mother    • Heart  "disease Father    • Heart attack Father    • Diabetes Father    • Heart disease Half-Sister         Dad's side   • Heart disease Brother    • No Known Problems Sister    • No Known Problems Sister    • No Known Problems Sister    • No Known Problems Sister    • No Known Problems Sister    • Pancreatic cancer Half-Sister         Dad's side   • No Known Problems Brother    • No Known Problems Brother    • Heart attack Half-Brother    • Heart attack Half-Brother    • No Known Problems Maternal Grandmother    • No Known Problems Maternal Grandfather    • No Known Problems Paternal Grandmother    • No Known Problems Paternal Grandfather      Social History     Tobacco Use   • Smoking status: Former Smoker     Packs/day: 0.25     Years: 46.00     Pack years: 11.50     Types: Cigarettes     Start date: 1999     Quit date: 2/15/2022     Years since quittin.1   • Smokeless tobacco: Never Used   • Tobacco comment: only smoking 5 a day - quit 2021   Substance Use Topics   • Alcohol use: No   • Drug use: Not Currently     Types: LSD, Marijuana, Methamphetamines     Allergies:  Adhesive tape, Latex, Nsaids, and Other     REVIEW OF SYSTEMS    Review of Systems   Constitutional: Positive for fatigue.   Respiratory: Positive for shortness of breath.    Cardiovascular: Positive for leg swelling.   Gastrointestinal: Positive for abdominal distention.   Genitourinary: Negative.    Musculoskeletal: Positive for back pain and myalgias.   Skin: Positive for pallor.   Neurological: Positive for weakness.       Objective   OBJECTIVE   Vital Signs  Temp:  [95.9 °F (35.5 °C)-97.3 °F (36.3 °C)] (P) 95.9 °F (35.5 °C)  Heart Rate:  [66-84] 84  Resp:  [18-22] 20  BP: (133-177)/(57-87) (P) 160/94    Flowsheet Rows    Flowsheet Row First Filed Value   Admission Height 157.5 cm (62\") Documented at 04/15/2022 1323   Admission Weight 136 kg (300 lb) Documented at 04/15/2022 1323           I/O last 3 completed shifts:  In: -   Out: " 300 [Urine:300]    PHYSICAL EXAM    Physical Exam  Constitutional:       General: She is not in acute distress.     Appearance: She is obese. She is ill-appearing.   HENT:      Head: Normocephalic and atraumatic.   Cardiovascular:      Rate and Rhythm: Normal rate and regular rhythm.   Pulmonary:      Breath sounds: Rales present.   Abdominal:      General: There is distension.   Musculoskeletal:      Right lower leg: Edema present.      Left lower leg: Edema present.   Skin:     General: Skin is warm and dry.      Coloration: Skin is pale.   Neurological:      Mental Status: Mental status is at baseline.         RESULTS   Results Review:    Results from last 7 days   Lab Units 04/16/22  0604 04/15/22  1340   SODIUM mmol/L 129* 130*   POTASSIUM mmol/L 5.0 3.6   CHLORIDE mmol/L 92* 93*   CO2 mmol/L 24.0 26.0   BUN mg/dL 57* 42*   CREATININE mg/dL 3.55* 3.34*   CALCIUM mg/dL 8.5* 8.8   BILIRUBIN mg/dL 0.4 0.6   ALK PHOS U/L 223* 240*   ALT (SGPT) U/L 8 8   AST (SGOT) U/L 10 13   GLUCOSE mg/dL 321* 164*       Estimated Creatinine Clearance: 21.9 mL/min (A) (by C-G formula based on SCr of 3.55 mg/dL (H)).                Results from last 7 days   Lab Units 04/16/22  0604 04/15/22  1340   WBC 10*3/mm3 8.58 8.08   HEMOGLOBIN g/dL 7.4* 7.2*   PLATELETS 10*3/mm3 267 287              MEDICATIONS    aspirin, 81 mg, Oral, Daily  atorvastatin, 20 mg, Oral, Daily  clopidogrel, 75 mg, Oral, Daily  DULoxetine, 20 mg, Oral, Daily  gabapentin, 100 mg, Oral, Q8H  heparin (porcine), 5,000 Units, Subcutaneous, Q8H  insulin aspart, 0-24 Units, Subcutaneous, TID AC  insulin detemir, 20 Units, Subcutaneous, Nightly  ipratropium-albuterol, 3 mL, Nebulization, 4x Daily - RT  isosorbide mononitrate, 30 mg, Oral, QAM  levothyroxine, 25 mcg, Oral, Q AM  lisinopril, 5 mg, Oral, Daily  metoprolol tartrate, 25 mg, Oral, Q12H  montelukast, 10 mg, Oral, Nightly  nystatin, , Topical, BID  oxybutynin XL, 5 mg, Oral, Daily  pantoprazole, 40 mg, Oral,  Nightly  ranolazine, 500 mg, Oral, Q12H  senna-docusate sodium, 2 tablet, Oral, BID  sevelamer, 800 mg, Oral, TID With Meals  sodium chloride, 10 mL, Intravenous, Q12H         Medications Prior to Admission   Medication Sig Dispense Refill Last Dose   • acetaminophen (Tylenol 8 Hour) 650 MG 8 hr tablet Take 1 tablet by mouth Every 8 (Eight) Hours As Needed for Mild Pain . 90 tablet 0 Past Week at Unknown time   • albuterol (PROVENTIL) (2.5 MG/3ML) 0.083% nebulizer solution Inhale the contents of 1 vial by nebulization Every 4 (Four) Hours As Needed for Wheezing. 75 mL 3 4/14/2022 at Unknown time   • albuterol sulfate  (90 Base) MCG/ACT inhaler Inhale 2 puffs Every 4 (Four) Hours As Needed for Wheezing. 18 g 1 4/14/2022 at Unknown time   • aspirin (aspirin) 81 MG EC tablet Take 1 tablet by mouth Daily. 60 tablet 5 4/15/2022 at Unknown time   • atorvastatin (LIPITOR) 20 MG tablet Take 1 tablet by mouth every night at bedtime. 90 tablet 1 4/14/2022 at Unknown time   • Blood Glucose Monitoring Suppl ([employer] Blood Glucose Meter) w/Device kit 1 each 3 (Three) Times a Day. 1 kit 3 Past Week at Unknown time   • bumetanide (BUMEX) 2 MG tablet Take 1 tablet by mouth 4 (Four) Times a Week. Take on non-hemodialysis days. 16 tablet 0 4/15/2022 at Unknown time   • Capsaicin 0.035 % cream Apply 3 g topically 3 (Three) Times a Day As Needed (ankle pain). 42.5 g 2 Past Week at Unknown time   • clopidogrel (PLAVIX) 75 MG tablet Take 1 tablet by mouth Daily. 30 tablet 5 4/15/2022 at Unknown time   • Continuous Blood Gluc  (FreeStyle Jade 2 [place]) device 1 each Continuous. 1 each 0 Past Week at Unknown time   • Continuous Blood Gluc Sensor (FreeStyle Jade 2 Sensor) misc 1 each Every 14 (Fourteen) Days. 2 each 11 Past Week at Unknown time   • cyclobenzaprine (FLEXERIL) 5 MG tablet Take 10 mg by mouth.   4/15/2022 at Unknown time   • Diclofenac Sodium (VOLTAREN) 1 % gel gel Apply 4 g topically to the appropriate area as  "directed 4 (Four) Times a Day As Needed (Ankle pain). 350 g 2 Past Week at Unknown time   • DULoxetine (CYMBALTA) 20 MG capsule Take 1 capsule by mouth Daily for 30 days. 90 capsule 1    • Easy Touch Insulin Syringe 30G X 5/16\" 0.5 ML misc USE AS DIRECTED WITH LEVEMIR   Past Week at Unknown time   • EASY TOUCH PEN NEEDLES 31G X 8 MM misc    Past Week at Unknown time   • gabapentin (NEURONTIN) 800 MG tablet Take 1 tablet by mouth 3 (Three) Times a Day. 90 tablet 2 4/15/2022 at Unknown time   • glucose blood test strip Use as instructed 100 each 12 Past Week at Unknown time   • insulin detemir (LEVEMIR) 100 UNIT/ML injection Inject 20 Units under the skin into the appropriate area as directed Every Night. (Patient taking differently: Inject 30 Units under the skin into the appropriate area as directed 2 (Two) Times a Day.) 15 mL 12 4/14/2022 at Unknown time   • Insulin Lispro, 1 Unit Dial, (HUMALOG) 100 UNIT/ML solution pen-injector Inject 12 Units under the skin into the appropriate area as directed 3 (Three) Times a Day With Meals. 30 mL 12 4/15/2022 at Unknown time   • Insulin Pen Needle (NovoFine) 30G X 8 MM misc As directed 4 times daily 100 each 11 Past Week at Unknown time   • Insulin Pen Needle 31G X 8 MM misc Use to inject insulin 4 (Four) Times a Day as directed. 120 each 12 Past Week at Unknown time   • ipratropium-albuterol (DUO-NEB) 0.5-2.5 mg/3 ml nebulizer Take 3 mL by nebulization 4 (Four) Times a Day.   4/14/2022 at Unknown time   • isosorbide mononitrate (IMDUR) 30 MG 24 hr tablet Take 1 tablet by mouth Every Morning. 90 tablet 1 4/15/2022 at Unknown time   • lidocaine (LIDODERM) 5 % APPLY TO THE AFFECTED AREA(S) TOPICALLY TWO TIMES A DAY. REMOVE NIGHTLY 30 patch 1 Past Week at Unknown time   • lisinopril (PRINIVIL,ZESTRIL) 5 MG tablet Take 1 tablet by mouth Daily. 90 tablet 1 4/15/2022 at Unknown time   • metoprolol tartrate (LOPRESSOR) 25 MG tablet Take 1 tablet by mouth Every 12 (Twelve) Hours. 60 " tablet 3 4/15/2022 at Unknown time   • montelukast (SINGULAIR) 10 MG tablet Take 1 tablet by mouth Every Night. 30 tablet 5 4/14/2022 at Unknown time   • muscle rub (BenGay) 10-15 % cream cream Apply 1 application topically to the appropriate area as directed 4 (Four) Times a Day As Needed for buttock pain. 85 g 1 Past Week at Unknown time   • O2 (OXYGEN) Inhale 3 L/min Continuous.   4/15/2022 at Unknown time   • ondansetron ODT (Zofran ODT) 4 MG disintegrating tablet Place 1 tablet on the tongue Every 8 (Eight) Hours As Needed for Nausea or Vomiting. 30 tablet 0 Past Month at Unknown time   • oxybutynin XL (DITROPAN-XL) 5 MG 24 hr tablet Take 1 tablet by mouth Daily. 90 tablet 1 4/15/2022 at Unknown time   • pantoprazole (PROTONIX) 40 MG EC tablet Take 1 tablet by mouth Every Night. 30 tablet 5 4/15/2022 at Unknown time   • ranolazine (RANEXA) 500 MG 12 hr tablet Take 1 tablet by mouth Every 12 (Twelve) Hours. 60 tablet 0 4/15/2022 at Unknown time   • sevelamer (RENVELA) 800 MG tablet Take 800 mg by mouth 3 (Three) Times a Day With Meals.   4/15/2022 at Unknown time   • levothyroxine (SYNTHROID, LEVOTHROID) 25 MCG tablet Take 25 mcg by mouth Daily. (Patient not taking: No sig reported)      • nitroglycerin (NITROSTAT) 0.4 MG SL tablet Place 0.4 mg under the tongue Every 5 (Five) Minutes As Needed for Chest Pain (x 3 doses).      Indication: Shortness of air, triage protocol     Findings: PA and lateral views of the chest are obtained. There  is a tunneled right internal jugular catheter with its tip at the  cavoatrial junction. There is cardiomegaly. The mediastinum is  normal width. Prominence of central vasculature is cephalization.  Right pleural effusion. Right base airspace consolidation.  Diffuse interstitial opacities.     IMPRESSION:  Cardiomegaly. Central vascular congestion. Diffuse  interstitial opacities likely edema. Right base opacity  consistent with effusion and airspace  consolidation.  Assessment/Plan   ASSESSMENT / PLAN      Hypervolemia    Hypervolemia, unspecified hypervolemia type    1.  ESRD on HD- Initiated HD on 10/21 due to hyperkalemia and fluid overload, now ESRD.  HD MWF for now.  She signed off early from dialysis on Wednesday and missed Friday session entirely.  Seen on dialysis today- patient is somewhat lethargic during exam- goal is 5000 removal today. She was tolerating HD without complaints.  She does make some urine on her own and she rec'd lasix 80mg last evening- had 300cc output.  - Tried     2.  Anemia / previous GI bleed / b12 deficiency-  s/p capsule endoscopy which showed few small non-bleeding intestinal AVMs and single small polyp.She rec'd transfusion last admission in March. Current Hgb 7.4Dr Alexander has seen the patient in the past. On epogen with dialysis    3. Procalcitonin 0.27- blood culture is pending. 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      4.  Shortness of breath/chronic respiratory failure- on oxygen and night time trilogy.      5.  History of CAD- on Imdur, Ranexa. NTG prn. Sees Cardiology outpt, Dr Pimentel. Current EKG reports right BBB with PVCs.     6.  HTN- /86- on lisinopril 5mg daily, metoprolol. There is edema- will try to get extra fluid off today    7. CKD/MBD- Calcium 8.5. On sevelamer 800mg tid with meals    8. Electrolyte imbalances- Na 129 and potassium 5,0- got oral KCL x 1 yesterday. Stopping this. Will modulate electrolyte with HD.        Thank you for the consult, we will continue to follow this patient.            I discussed the patients findings and my recommendations with patient      This document has been electronically signed by BRIDGETT Gagnon on April 16, 2022 09:02 CDT

## 2022-04-17 LAB
ALBUMIN SERPL-MCNC: 3.4 G/DL (ref 3.5–5.2)
ALBUMIN SERPL-MCNC: 3.5 G/DL (ref 3.5–5.2)
ALBUMIN/GLOB SERPL: 0.9 G/DL
ALBUMIN/GLOB SERPL: 0.9 G/DL
ALP SERPL-CCNC: 200 U/L (ref 39–117)
ALP SERPL-CCNC: 216 U/L (ref 39–117)
ALT SERPL W P-5'-P-CCNC: 6 U/L (ref 1–33)
ALT SERPL W P-5'-P-CCNC: 6 U/L (ref 1–33)
ANION GAP SERPL CALCULATED.3IONS-SCNC: 12 MMOL/L (ref 5–15)
ANION GAP SERPL CALCULATED.3IONS-SCNC: 13 MMOL/L (ref 5–15)
AST SERPL-CCNC: 7 U/L (ref 1–32)
AST SERPL-CCNC: 9 U/L (ref 1–32)
BACTERIA UR QL AUTO: ABNORMAL /HPF
BASOPHILS # BLD AUTO: 0.05 10*3/MM3 (ref 0–0.2)
BASOPHILS NFR BLD AUTO: 0.5 % (ref 0–1.5)
BILIRUB SERPL-MCNC: 0.4 MG/DL (ref 0–1.2)
BILIRUB SERPL-MCNC: 0.4 MG/DL (ref 0–1.2)
BILIRUB UR QL STRIP: NEGATIVE
BUN SERPL-MCNC: 50 MG/DL (ref 8–23)
BUN SERPL-MCNC: 54 MG/DL (ref 8–23)
BUN/CREAT SERPL: 15.8 (ref 7–25)
BUN/CREAT SERPL: 16.9 (ref 7–25)
CALCIUM SPEC-SCNC: 8.5 MG/DL (ref 8.6–10.5)
CALCIUM SPEC-SCNC: 8.8 MG/DL (ref 8.6–10.5)
CHLORIDE SERPL-SCNC: 91 MMOL/L (ref 98–107)
CHLORIDE SERPL-SCNC: 97 MMOL/L (ref 98–107)
CLARITY UR: CLEAR
CO2 SERPL-SCNC: 24 MMOL/L (ref 22–29)
CO2 SERPL-SCNC: 26 MMOL/L (ref 22–29)
COLOR UR: YELLOW
CREAT SERPL-MCNC: 2.96 MG/DL (ref 0.57–1)
CREAT SERPL-MCNC: 3.42 MG/DL (ref 0.57–1)
DEPRECATED RDW RBC AUTO: 54.1 FL (ref 37–54)
EGFRCR SERPLBLD CKD-EPI 2021: 14.5 ML/MIN/1.73
EGFRCR SERPLBLD CKD-EPI 2021: 17.2 ML/MIN/1.73
EOSINOPHIL # BLD AUTO: 0.05 10*3/MM3 (ref 0–0.4)
EOSINOPHIL NFR BLD AUTO: 0.5 % (ref 0.3–6.2)
ERYTHROCYTE [DISTWIDTH] IN BLOOD BY AUTOMATED COUNT: 16.9 % (ref 12.3–15.4)
GLOBULIN UR ELPH-MCNC: 3.7 GM/DL
GLOBULIN UR ELPH-MCNC: 3.9 GM/DL
GLUCOSE BLDC GLUCOMTR-MCNC: 143 MG/DL (ref 70–130)
GLUCOSE BLDC GLUCOMTR-MCNC: 171 MG/DL (ref 70–130)
GLUCOSE BLDC GLUCOMTR-MCNC: 204 MG/DL (ref 70–130)
GLUCOSE BLDC GLUCOMTR-MCNC: 237 MG/DL (ref 70–130)
GLUCOSE BLDC GLUCOMTR-MCNC: 263 MG/DL (ref 70–130)
GLUCOSE BLDC GLUCOMTR-MCNC: 339 MG/DL (ref 70–130)
GLUCOSE BLDC GLUCOMTR-MCNC: 403 MG/DL (ref 70–130)
GLUCOSE BLDC GLUCOMTR-MCNC: 413 MG/DL (ref 70–130)
GLUCOSE BLDC GLUCOMTR-MCNC: 431 MG/DL (ref 70–130)
GLUCOSE BLDC GLUCOMTR-MCNC: 434 MG/DL (ref 70–130)
GLUCOSE BLDC GLUCOMTR-MCNC: 439 MG/DL (ref 70–130)
GLUCOSE BLDC GLUCOMTR-MCNC: 439 MG/DL (ref 70–130)
GLUCOSE BLDC GLUCOMTR-MCNC: 440 MG/DL (ref 70–130)
GLUCOSE BLDC GLUCOMTR-MCNC: 449 MG/DL (ref 70–130)
GLUCOSE SERPL-MCNC: 185 MG/DL (ref 65–99)
GLUCOSE SERPL-MCNC: 276 MG/DL (ref 65–99)
GLUCOSE UR STRIP-MCNC: ABNORMAL MG/DL
HCT VFR BLD AUTO: 23.9 % (ref 34–46.6)
HGB BLD-MCNC: 7.4 G/DL (ref 12–15.9)
HGB UR QL STRIP.AUTO: NEGATIVE
HYALINE CASTS UR QL AUTO: ABNORMAL /LPF
IMM GRANULOCYTES # BLD AUTO: 0.1 10*3/MM3 (ref 0–0.05)
IMM GRANULOCYTES NFR BLD AUTO: 1 % (ref 0–0.5)
KETONES UR QL STRIP: NEGATIVE
LEUKOCYTE ESTERASE UR QL STRIP.AUTO: NEGATIVE
LYMPHOCYTES # BLD AUTO: 1.68 10*3/MM3 (ref 0.7–3.1)
LYMPHOCYTES NFR BLD AUTO: 17.2 % (ref 19.6–45.3)
MAGNESIUM SERPL-MCNC: 2.2 MG/DL (ref 1.6–2.4)
MCH RBC QN AUTO: 28 PG (ref 26.6–33)
MCHC RBC AUTO-ENTMCNC: 31 G/DL (ref 31.5–35.7)
MCV RBC AUTO: 90.5 FL (ref 79–97)
MONOCYTES # BLD AUTO: 0.79 10*3/MM3 (ref 0.1–0.9)
MONOCYTES NFR BLD AUTO: 8.1 % (ref 5–12)
NEUTROPHILS NFR BLD AUTO: 7.1 10*3/MM3 (ref 1.7–7)
NEUTROPHILS NFR BLD AUTO: 72.7 % (ref 42.7–76)
NITRITE UR QL STRIP: NEGATIVE
NRBC BLD AUTO-RTO: 0.4 /100 WBC (ref 0–0.2)
OSMOLALITY UR: 351 MOSM/KG (ref 38–1400)
PH UR STRIP.AUTO: 6.5 [PH] (ref 5–9)
PLATELET # BLD AUTO: 286 10*3/MM3 (ref 140–450)
PMV BLD AUTO: 9 FL (ref 6–12)
POTASSIUM SERPL-SCNC: 4 MMOL/L (ref 3.5–5.2)
POTASSIUM SERPL-SCNC: 4.3 MMOL/L (ref 3.5–5.2)
PROT SERPL-MCNC: 7.1 G/DL (ref 6–8.5)
PROT SERPL-MCNC: 7.4 G/DL (ref 6–8.5)
PROT UR QL STRIP: ABNORMAL
RBC # BLD AUTO: 2.64 10*6/MM3 (ref 3.77–5.28)
RBC # UR STRIP: ABNORMAL /HPF
REF LAB TEST METHOD: ABNORMAL
SODIUM SERPL-SCNC: 128 MMOL/L (ref 136–145)
SODIUM SERPL-SCNC: 135 MMOL/L (ref 136–145)
SP GR UR STRIP: 1.02 (ref 1–1.03)
SQUAMOUS #/AREA URNS HPF: ABNORMAL /HPF
TROPONIN T SERPL-MCNC: 0.02 NG/ML (ref 0–0.03)
TROPONIN T SERPL-MCNC: 0.02 NG/ML (ref 0–0.03)
UROBILINOGEN UR QL STRIP: ABNORMAL
WBC # UR STRIP: ABNORMAL /HPF
WBC NRBC COR # BLD: 9.77 10*3/MM3 (ref 3.4–10.8)

## 2022-04-17 PROCEDURE — 93010 ELECTROCARDIOGRAM REPORT: CPT | Performed by: INTERNAL MEDICINE

## 2022-04-17 PROCEDURE — 83735 ASSAY OF MAGNESIUM: CPT | Performed by: STUDENT IN AN ORGANIZED HEALTH CARE EDUCATION/TRAINING PROGRAM

## 2022-04-17 PROCEDURE — 84484 ASSAY OF TROPONIN QUANT: CPT | Performed by: FAMILY MEDICINE

## 2022-04-17 PROCEDURE — 36415 COLL VENOUS BLD VENIPUNCTURE: CPT | Performed by: STUDENT IN AN ORGANIZED HEALTH CARE EDUCATION/TRAINING PROGRAM

## 2022-04-17 PROCEDURE — G0378 HOSPITAL OBSERVATION PER HR: HCPCS

## 2022-04-17 PROCEDURE — 82962 GLUCOSE BLOOD TEST: CPT

## 2022-04-17 PROCEDURE — 83935 ASSAY OF URINE OSMOLALITY: CPT | Performed by: STUDENT IN AN ORGANIZED HEALTH CARE EDUCATION/TRAINING PROGRAM

## 2022-04-17 PROCEDURE — 82010 KETONE BODYS QUAN: CPT | Performed by: FAMILY MEDICINE

## 2022-04-17 PROCEDURE — 80053 COMPREHEN METABOLIC PANEL: CPT | Performed by: STUDENT IN AN ORGANIZED HEALTH CARE EDUCATION/TRAINING PROGRAM

## 2022-04-17 PROCEDURE — 25010000002 HEPARIN (PORCINE) PER 1000 UNITS: Performed by: STUDENT IN AN ORGANIZED HEALTH CARE EDUCATION/TRAINING PROGRAM

## 2022-04-17 PROCEDURE — 87086 URINE CULTURE/COLONY COUNT: CPT | Performed by: STUDENT IN AN ORGANIZED HEALTH CARE EDUCATION/TRAINING PROGRAM

## 2022-04-17 PROCEDURE — 94799 UNLISTED PULMONARY SVC/PX: CPT

## 2022-04-17 PROCEDURE — 93005 ELECTROCARDIOGRAM TRACING: CPT | Performed by: FAMILY MEDICINE

## 2022-04-17 PROCEDURE — 93005 ELECTROCARDIOGRAM TRACING: CPT | Performed by: STUDENT IN AN ORGANIZED HEALTH CARE EDUCATION/TRAINING PROGRAM

## 2022-04-17 PROCEDURE — 63710000001 INSULIN ASPART PER 5 UNITS: Performed by: STUDENT IN AN ORGANIZED HEALTH CARE EDUCATION/TRAINING PROGRAM

## 2022-04-17 PROCEDURE — 96372 THER/PROPH/DIAG INJ SC/IM: CPT

## 2022-04-17 PROCEDURE — 84484 ASSAY OF TROPONIN QUANT: CPT | Performed by: STUDENT IN AN ORGANIZED HEALTH CARE EDUCATION/TRAINING PROGRAM

## 2022-04-17 PROCEDURE — 94760 N-INVAS EAR/PLS OXIMETRY 1: CPT

## 2022-04-17 PROCEDURE — 99224 PR SBSQ OBSERVATION CARE/DAY 15 MINUTES: CPT | Performed by: STUDENT IN AN ORGANIZED HEALTH CARE EDUCATION/TRAINING PROGRAM

## 2022-04-17 PROCEDURE — 85025 COMPLETE CBC W/AUTO DIFF WBC: CPT | Performed by: STUDENT IN AN ORGANIZED HEALTH CARE EDUCATION/TRAINING PROGRAM

## 2022-04-17 PROCEDURE — 63710000001 INSULIN DETEMIR PER 5 UNITS: Performed by: FAMILY MEDICINE

## 2022-04-17 PROCEDURE — 81001 URINALYSIS AUTO W/SCOPE: CPT | Performed by: STUDENT IN AN ORGANIZED HEALTH CARE EDUCATION/TRAINING PROGRAM

## 2022-04-17 RX ORDER — FUROSEMIDE 40 MG/1
80 TABLET ORAL
Status: DISCONTINUED | OUTPATIENT
Start: 2022-04-17 | End: 2022-04-19

## 2022-04-17 RX ORDER — NICOTINE POLACRILEX 4 MG
15 LOZENGE BUCCAL
Status: DISCONTINUED | OUTPATIENT
Start: 2022-04-17 | End: 2022-04-19 | Stop reason: HOSPADM

## 2022-04-17 RX ORDER — DEXTROSE MONOHYDRATE 25 G/50ML
25 INJECTION, SOLUTION INTRAVENOUS
Status: DISCONTINUED | OUTPATIENT
Start: 2022-04-17 | End: 2022-04-19 | Stop reason: HOSPADM

## 2022-04-17 RX ORDER — GABAPENTIN 300 MG/1
300 CAPSULE ORAL EVERY 12 HOURS SCHEDULED
Status: DISCONTINUED | OUTPATIENT
Start: 2022-04-17 | End: 2022-04-19 | Stop reason: HOSPADM

## 2022-04-17 RX ADMIN — INSULIN ASPART 12 UNITS: 100 INJECTION, SOLUTION INTRAVENOUS; SUBCUTANEOUS at 17:41

## 2022-04-17 RX ADMIN — DOCUSATE SODIUM 50 MG AND SENNOSIDES 8.6 MG 2 TABLET: 8.6; 5 TABLET, FILM COATED ORAL at 08:35

## 2022-04-17 RX ADMIN — ORAL SEMAGLUTIDE 3 MG: 3 TABLET ORAL at 15:29

## 2022-04-17 RX ADMIN — INSULIN ASPART 4 UNITS: 100 INJECTION, SOLUTION INTRAVENOUS; SUBCUTANEOUS at 05:53

## 2022-04-17 RX ADMIN — GABAPENTIN 300 MG: 300 CAPSULE ORAL at 20:28

## 2022-04-17 RX ADMIN — LISINOPRIL 5 MG: 20 TABLET ORAL at 08:36

## 2022-04-17 RX ADMIN — OXYBUTYNIN CHLORIDE 5 MG: 5 TABLET, EXTENDED RELEASE ORAL at 08:35

## 2022-04-17 RX ADMIN — METOPROLOL TARTRATE 25 MG: 25 TABLET, FILM COATED ORAL at 08:37

## 2022-04-17 RX ADMIN — IPRATROPIUM BROMIDE AND ALBUTEROL SULFATE 3 ML: .5; 3 SOLUTION RESPIRATORY (INHALATION) at 12:33

## 2022-04-17 RX ADMIN — LEVOTHYROXINE SODIUM 25 MCG: 25 TABLET ORAL at 05:52

## 2022-04-17 RX ADMIN — ATORVASTATIN CALCIUM 20 MG: 20 TABLET, FILM COATED ORAL at 08:36

## 2022-04-17 RX ADMIN — HEPARIN SODIUM 5000 UNITS: 5000 INJECTION, SOLUTION INTRAVENOUS; SUBCUTANEOUS at 20:28

## 2022-04-17 RX ADMIN — DULOXETINE HYDROCHLORIDE 20 MG: 20 CAPSULE, DELAYED RELEASE ORAL at 08:36

## 2022-04-17 RX ADMIN — NYSTATIN: 100000 POWDER TOPICAL at 15:35

## 2022-04-17 RX ADMIN — METOPROLOL TARTRATE 25 MG: 25 TABLET, FILM COATED ORAL at 20:27

## 2022-04-17 RX ADMIN — SEVELAMER CARBONATE 800 MG: 800 TABLET, FILM COATED ORAL at 08:36

## 2022-04-17 RX ADMIN — NYSTATIN: 100000 POWDER TOPICAL at 20:29

## 2022-04-17 RX ADMIN — PANTOPRAZOLE SODIUM 40 MG: 40 TABLET, DELAYED RELEASE ORAL at 20:28

## 2022-04-17 RX ADMIN — IPRATROPIUM BROMIDE AND ALBUTEROL SULFATE 3 ML: .5; 3 SOLUTION RESPIRATORY (INHALATION) at 07:05

## 2022-04-17 RX ADMIN — INSULIN ASPART 7 UNITS: 100 INJECTION, SOLUTION INTRAVENOUS; SUBCUTANEOUS at 02:34

## 2022-04-17 RX ADMIN — ISOSORBIDE MONONITRATE 30 MG: 30 TABLET, EXTENDED RELEASE ORAL at 05:52

## 2022-04-17 RX ADMIN — FUROSEMIDE 80 MG: 40 TABLET ORAL at 11:50

## 2022-04-17 RX ADMIN — ASPIRIN 81 MG: 81 TABLET, FILM COATED ORAL at 08:37

## 2022-04-17 RX ADMIN — IPRATROPIUM BROMIDE AND ALBUTEROL SULFATE 3 ML: .5; 3 SOLUTION RESPIRATORY (INHALATION) at 19:47

## 2022-04-17 RX ADMIN — SEVELAMER CARBONATE 800 MG: 800 TABLET, FILM COATED ORAL at 17:41

## 2022-04-17 RX ADMIN — INSULIN ASPART 4 UNITS: 100 INJECTION, SOLUTION INTRAVENOUS; SUBCUTANEOUS at 11:50

## 2022-04-17 RX ADMIN — GABAPENTIN 100 MG: 100 CAPSULE ORAL at 05:53

## 2022-04-17 RX ADMIN — Medication 10 ML: at 08:43

## 2022-04-17 RX ADMIN — INSULIN ASPART 8 UNITS: 100 INJECTION, SOLUTION INTRAVENOUS; SUBCUTANEOUS at 15:31

## 2022-04-17 RX ADMIN — HEPARIN SODIUM 5000 UNITS: 5000 INJECTION, SOLUTION INTRAVENOUS; SUBCUTANEOUS at 15:30

## 2022-04-17 RX ADMIN — GABAPENTIN 100 MG: 100 CAPSULE ORAL at 15:30

## 2022-04-17 RX ADMIN — RANOLAZINE 500 MG: 500 TABLET, FILM COATED, EXTENDED RELEASE ORAL at 20:30

## 2022-04-17 RX ADMIN — INSULIN DETEMIR 27 UNITS: 100 INJECTION, SOLUTION SUBCUTANEOUS at 20:27

## 2022-04-17 RX ADMIN — RANOLAZINE 500 MG: 500 TABLET, FILM COATED, EXTENDED RELEASE ORAL at 08:37

## 2022-04-17 RX ADMIN — INSULIN ASPART 16 UNITS: 100 INJECTION, SOLUTION INTRAVENOUS; SUBCUTANEOUS at 20:28

## 2022-04-17 RX ADMIN — IPRATROPIUM BROMIDE AND ALBUTEROL SULFATE 3 ML: .5; 3 SOLUTION RESPIRATORY (INHALATION) at 14:54

## 2022-04-17 RX ADMIN — CLOPIDOGREL BISULFATE 75 MG: 75 TABLET ORAL at 08:37

## 2022-04-17 RX ADMIN — INSULIN ASPART 4 UNITS: 100 INJECTION, SOLUTION INTRAVENOUS; SUBCUTANEOUS at 23:18

## 2022-04-17 RX ADMIN — Medication 10 ML: at 20:30

## 2022-04-17 RX ADMIN — SEVELAMER CARBONATE 800 MG: 800 TABLET, FILM COATED ORAL at 11:50

## 2022-04-17 RX ADMIN — HEPARIN SODIUM 5000 UNITS: 5000 INJECTION, SOLUTION INTRAVENOUS; SUBCUTANEOUS at 05:52

## 2022-04-17 RX ADMIN — MONTELUKAST 10 MG: 10 TABLET, FILM COATED ORAL at 20:27

## 2022-04-17 NOTE — PLAN OF CARE
Goal Outcome Evaluation:  Plan of Care Reviewed With: patient        Progress: improving  Outcome Evaluation: pt more alert this shift. new med started and fsbs lower throughout shift than previous

## 2022-04-17 NOTE — NURSING NOTE
"Pt agitated after asking what the dose of Gabapentin was that Rn was administering, stated \"I don't take that...where is that that was in my purse.\" RN explained it was in pharmacy and pt was very drowsy last night, falling asleep during conversation and had almost fallen off of bed several times just sitting on edge. RN stated that she had asked pt if she had medication with her as it is our policy that pts not have medications at bedside. Pt stated \"cause I didn't want to give it up.\" Pt reeducated on our policy and that some medications taken were controlled substances. Pt was face timing spouse during conversation.  "

## 2022-04-17 NOTE — SIGNIFICANT NOTE
04/17/22 0932   OTHER   Discipline physical therapist   Rehab Time/Intention   Session Not Performed patient/family declined, not feeling well   Recommendation   PT - Next Appointment 04/18/22     PT evaluation attempted.  Patient reports feeling poorly, requests evaluation at a later date.

## 2022-04-17 NOTE — NURSING NOTE
Pt unable to void at this time, bladder scanned, resulted 28ml. Will have to wait until pt has more in bladder to either void or in and out to collect specimen for lab.

## 2022-04-17 NOTE — NURSING NOTE
Telemetry called RN to report pt switching into afib. RN got EKG. EKG reading HR 82 bpm A fib with competing junctional pacemaker with with PVC or PAC. Resident on call paged. Pt asymptommatic at this time

## 2022-04-17 NOTE — PROGRESS NOTES
FAMILY MEDICINE DAILY PROGRESS NOTE    NAME: Yamileth Slater  : 1960  MRN: [id number]      LOS: 0 days     PROVIDER OF SERVICE: Rianna Macias MD    Chief Complaint: Admission for dialysis ([facility])    Subjective:   Patient Seen At: 0741    Interval History:  History taken from: patient  On the morning of 22 patient reports that she slept well and feels good this morning. No acute concerns this morning.  Or note, upon further question, patient reported that she was holding the xanaflex from her cousin.   Discussed with patient that she is currently taking a high dose of Gabapentin (800 mg), and she will be better off with gabapentin 400 mg due to her kidney status. Patient verbalized understanding.       Review of Systems:   Review of Systems   Constitutional: Negative for appetite change, chills, diaphoresis, fatigue and fever.   HENT: Negative for dental problem and trouble swallowing.    Eyes: Negative for visual disturbance.   Respiratory: Negative for cough, chest tightness, shortness of breath and wheezing.    Cardiovascular: Negative for chest pain, palpitations and leg swelling.   Gastrointestinal: Negative for abdominal pain, constipation, diarrhea, nausea and vomiting.   Genitourinary: Negative for dysuria.   Musculoskeletal: Negative for back pain and myalgias.   Skin: Negative for rash.   Neurological: Negative for dizziness, facial asymmetry and headaches.   Psychiatric/Behavioral: Negative for behavioral problems.       Objective:     Vital Signs  Temp:  [96 °F (35.6 °C)-97.9 °F (36.6 °C)] 97.9 °F (36.6 °C)  Heart Rate:  [63-82] 82  Resp:  [18-20] 18  BP: (133-160)/(62-85) 160/77  Body mass index is 54.4 kg/m².    Physical Exam  Physical Exam  Vitals reviewed.   Constitutional:       General: She is not in acute distress.     Appearance: She is obese. She is not toxic-appearing.   HENT:      Head: Normocephalic and atraumatic.      Right Ear: External ear normal.      Left Ear:  External ear normal.      Mouth/Throat:      Pharynx: Oropharynx is clear.   Eyes:      Conjunctiva/sclera: Conjunctivae normal.   Cardiovascular:      Rate and Rhythm: Normal rate and regular rhythm.   Pulmonary:      Effort: Pulmonary effort is normal.      Breath sounds: Normal breath sounds. No wheezing.      Comments: On 3 L NC  Abdominal:      General: Abdomen is protuberant.      Palpations: Abdomen is soft.      Tenderness: There is no abdominal tenderness.   Musculoskeletal:      Right lower leg: No edema.      Left lower leg: No edema.   Skin:     General: Skin is warm and dry.   Neurological:      Mental Status: She is alert.         Medication Review    Current Facility-Administered Medications:   •  acetaminophen (TYLENOL) tablet 650 mg, 650 mg, Oral, Q4H PRN **OR** acetaminophen (TYLENOL) 160 MG/5ML solution 650 mg, 650 mg, Oral, Q4H PRN **OR** acetaminophen (TYLENOL) suppository 650 mg, 650 mg, Rectal, Q4H PRN, Rianna Macias MD  •  albumin human 25 % IV SOLN 12.5 g, 12.5 g, Intravenous, PRN, Ace Lauren MD  •  albuterol (PROVENTIL) nebulizer solution 0.083% 2.5 mg/3mL, 2.5 mg, Nebulization, Q4H PRN, Rianna Macias MD  •  aspirin EC tablet 81 mg, 81 mg, Oral, Daily, Rianna Macias MD, 81 mg at 04/17/22 0837  •  atorvastatin (LIPITOR) tablet 20 mg, 20 mg, Oral, Daily, Rianna Macias MD, 20 mg at 04/17/22 0836  •  sennosides-docusate (PERICOLACE) 8.6-50 MG per tablet 2 tablet, 2 tablet, Oral, BID, 2 tablet at 04/17/22 0835 **AND** polyethylene glycol (MIRALAX) packet 17 g, 17 g, Oral, Daily PRN **AND** bisacodyl (DULCOLAX) EC tablet 5 mg, 5 mg, Oral, Daily PRN **AND** bisacodyl (DULCOLAX) suppository 10 mg, 10 mg, Rectal, Daily PRN, Rianna Macias MD  •  clopidogrel (PLAVIX) tablet 75 mg, 75 mg, Oral, Daily, Rianna Macias MD, 75 mg at 04/17/22 0837  •  dextrose (D50W) (25 g/50 mL) IV injection 25 g, 25 g, Intravenous, Q15 Min PRN, Eduin Griffin MD  •  dextrose (GLUTOSE) oral gel 15 g, 15  g, Oral, Q15 Min PRN, Eduin Griffin MD  •  DULoxetine (CYMBALTA) DR capsule 20 mg, 20 mg, Oral, Daily, Rianna Macias MD, 20 mg at 04/17/22 0836  •  furosemide (LASIX) tablet 80 mg, 80 mg, Oral, Once per day on Sun Tue Thu Sat, Janice Chavez, APRN, 80 mg at 04/17/22 1150  •  gabapentin (NEURONTIN) capsule 100 mg, 100 mg, Oral, Q8H, Radha Argueta MD, 100 mg at 04/17/22 1530  •  glucagon (human recombinant) (GLUCAGEN DIAGNOSTIC) injection 1 mg, 1 mg, Intramuscular, Q15 Min PRN, Eduin Griffin MD  •  heparin (porcine) 5000 UNIT/ML injection 5,000 Units, 5,000 Units, Subcutaneous, Q8H, Rianna Macias MD, 5,000 Units at 04/17/22 1530  •  heparin (porcine) injection 4,000 Units, 4,000 Units, Intracatheter, PRN, Ace Lauren MD, 4,000 Units at 04/16/22 0821  •  HYDROcodone-acetaminophen (NORCO) 5-325 MG per tablet 1 tablet, 1 tablet, Oral, Q4H PRN, Rianna Macias MD  •  insulin aspart (novoLOG) injection 0-24 Units, 0-24 Units, Subcutaneous, Q3H, Eduin Griffin MD, 8 Units at 04/17/22 1531  •  insulin detemir (LEVEMIR) injection 27 Units, 27 Units, Subcutaneous, Nightly, Radha Argueta MD, 27 Units at 04/16/22 2054  •  ipratropium-albuterol (DUO-NEB) nebulizer solution 3 mL, 3 mL, Nebulization, 4x Daily - RT, Rianna Macias MD, 3 mL at 04/17/22 1454  •  isosorbide mononitrate (IMDUR) 24 hr tablet 30 mg, 30 mg, Oral, QAM, Rianna Macias MD, 30 mg at 04/17/22 0552  •  levothyroxine (SYNTHROID, LEVOTHROID) tablet 25 mcg, 25 mcg, Oral, Q AM, Rianna Macias MD, 25 mcg at 04/17/22 0552  •  lisinopril (PRINIVIL,ZESTRIL) tablet 5 mg, 5 mg, Oral, Daily, Rianna Macias MD, 5 mg at 04/17/22 0836  •  metoprolol tartrate (LOPRESSOR) tablet 25 mg, 25 mg, Oral, Q12H, Rianna Macias MD, 25 mg at 04/17/22 0837  •  montelukast (SINGULAIR) tablet 10 mg, 10 mg, Oral, Nightly, Rianna Macias MD, 10 mg at 04/16/22 2022  •  nystatin (MYCOSTATIN) powder, , Topical, BID, Nestor Richards MD, Given at 04/17/22 1535  •  ondansetron  (ZOFRAN) tablet 4 mg, 4 mg, Oral, Q6H PRN, Rianna Macias MD  •  oxybutynin XL (DITROPAN-XL) 24 hr tablet 5 mg, 5 mg, Oral, Daily, Rianna Macias MD, 5 mg at 04/17/22 0835  •  pantoprazole (PROTONIX) EC tablet 40 mg, 40 mg, Oral, Nightly, Rianna Macias MD, 40 mg at 04/16/22 2023  •  ranolazine (RANEXA) 12 hr tablet 500 mg, 500 mg, Oral, Q12H, Rianna Macias MD, 500 mg at 04/17/22 0837  •  Semaglutide tablet 3 mg, 3 mg, Oral, Daily, LeEduin MD, 3 mg at 04/17/22 1529  •  sevelamer (RENVELA) tablet 800 mg, 800 mg, Oral, TID With Meals, Rianna Macias MD, 800 mg at 04/17/22 1150  •  sodium chloride 0.9 % flush 10 mL, 10 mL, Intravenous, PRN, Rianna Macias MD  •  sodium chloride 0.9 % flush 10 mL, 10 mL, Intravenous, Q12H, Rianna Macias MD, 10 mL at 04/17/22 0843  •  sodium chloride 0.9 % flush 10 mL, 10 mL, Intravenous, PRN, Rianna Macias MD     Diagnostic Data    Lab Results (last 24 hours)     Procedure Component Value Units Date/Time    Blood Culture - Blood, Arm, Left [[id number]]  (Normal) Collected: 04/15/22 1613    Specimen: Blood from Arm, Left Updated: 04/17/22 1632     Blood Culture No growth at 2 days    Blood Culture - Blood, Arm, Left [[id number]]  (Normal) Collected: 04/15/22 1538    Specimen: Blood from Arm, Left Updated: 04/17/22 1545     Blood Culture No growth at 2 days    POC Glucose Once [[id number]]  (Abnormal) Collected: 04/17/22 1425    Specimen: Blood Updated: 04/17/22 1437     Glucose 237 mg/dL      Comment: RN NotifiedOperator: [id number] SHERRI Stephens ID: [id number]       POC Glucose Once [[id number]]  (Abnormal) Collected: 04/17/22 1018    Specimen: Blood Updated: 04/17/22 1058     Glucose 171 mg/dL      Comment: RN NotifiedOperator: [id number] RICHA Welch ID: [id number]       POC Glucose Once [[id number]]  (Abnormal) Collected: 04/17/22 0841    Specimen: Blood Updated: 04/17/22 1058     Glucose 143 mg/dL      Comment: RN NotifiedOperator: [id number] SHERRI  Angelo ID: [id number]       Osmolality, Urine - Urine, Clean Catch [[id number]]  (Normal) Collected: 04/17/22 0907    Specimen: Urine, Clean Catch Updated: 04/17/22 0925     Osmolality, Urine 351 mOsm/kg     Urinalysis, Microscopic Only - Urine, Clean Catch [[id number]]  (Abnormal) Collected: 04/17/22 0907    Specimen: Urine, Clean Catch Updated: 04/17/22 0914     RBC, UA 0-2 /HPF      WBC, UA 6-12 /HPF      Bacteria, UA None Seen /HPF      Squamous Epithelial Cells, UA 3-5 /HPF      Hyaline Casts, UA 3-6 /LPF      Methodology Automated Microscopy    Urine Culture - Urine, Urine, Clean Catch [[id number]] Collected: 04/17/22 0907    Specimen: Urine, Clean Catch Updated: 04/17/22 0914    Urinalysis With Culture If Indicated - Urine, Clean Catch [[id number]]  (Abnormal) Collected: 04/17/22 0907    Specimen: Urine, Clean Catch Updated: 04/17/22 0912     Color, UA Yellow     Appearance, UA Clear     pH, UA 6.5     Specific Gravity, UA 1.019     Glucose, UA >=1000 mg/dL (3+)     Ketones, UA Negative     Bilirubin, UA Negative     Blood, UA Negative     Protein, UA >=300 mg/dL (3+)     Leuk Esterase, UA Negative     Nitrite, UA Negative     Urobilinogen, UA 0.2 E.U./dL    Comprehensive Metabolic Panel [[id number]]  (Abnormal) Collected: 04/17/22 0639    Specimen: Blood Updated: 04/17/22 0720     Glucose 185 mg/dL      BUN 50 mg/dL      Creatinine 2.96 mg/dL      Sodium 135 mmol/L      Potassium 4.0 mmol/L      Chloride 97 mmol/L      CO2 26.0 mmol/L      Calcium 8.5 mg/dL      Total Protein 7.1 g/dL      Albumin 3.40 g/dL      ALT (SGPT) 6 U/L      AST (SGOT) 7 U/L      Alkaline Phosphatase 200 U/L      Total Bilirubin 0.4 mg/dL      Globulin 3.7 gm/dL      A/G Ratio 0.9 g/dL      BUN/Creatinine Ratio 16.9     Anion Gap 12.0 mmol/L      eGFR 17.2 mL/min/1.73      Comment: National Kidney Foundation and American Society of Nephrology (ASN) Task Force recommended calculation based on the Chronic Kidney Disease Epidemiology  Collaboration (CKD-EPI) equation refit without adjustment for race.       Narrative:      GFR Normal >60  Chronic Kidney Disease <60  Kidney Failure <15      CBC & Differential [[id number]]  (Abnormal) Collected: 04/17/22 0639    Specimen: Blood Updated: 04/17/22 0710    Narrative:      The following orders were created for panel order CBC & Differential.  Procedure                               Abnormality         Status                     ---------                               -----------         ------                     CBC Auto Differential[[id number]]        Abnormal            Final result                 Please view results for these tests on the individual orders.    CBC Auto Differential [[id number]]  (Abnormal) Collected: 04/17/22 0639    Specimen: Blood Updated: 04/17/22 0710     WBC 9.77 10*3/mm3      RBC 2.64 10*6/mm3      Hemoglobin 7.4 g/dL      Hematocrit 23.9 %      MCV 90.5 fL      MCH 28.0 pg      MCHC 31.0 g/dL      RDW 16.9 %      RDW-SD 54.1 fl      MPV 9.0 fL      Platelets 286 10*3/mm3      Neutrophil % 72.7 %      Lymphocyte % 17.2 %      Monocyte % 8.1 %      Eosinophil % 0.5 %      Basophil % 0.5 %      Immature Grans % 1.0 %      Neutrophils, Absolute 7.10 10*3/mm3      Lymphocytes, Absolute 1.68 10*3/mm3      Monocytes, Absolute 0.79 10*3/mm3      Eosinophils, Absolute 0.05 10*3/mm3      Basophils, Absolute 0.05 10*3/mm3      Immature Grans, Absolute 0.10 10*3/mm3      nRBC 0.4 /100 WBC     Beta-Hydroxybutyrate [[id number]] Collected: 04/17/22 0639    Specimen: Blood Updated: 04/17/22 0700    POC Glucose Once [[id number]]  (Abnormal) Collected: 04/17/22 0550    Specimen: Blood Updated: 04/17/22 0603     Glucose 204 mg/dL      Comment: RN NotifiedOperator: [id number] VIANNEY Mcqueen ID: [id number]       POC Glucose Once [[id number]]  (Abnormal) Collected: 04/17/22 0211    Specimen: Blood Updated: 04/17/22 0224     Glucose 339 mg/dL      Comment: RN NotifiedOperator: [id number]  VIANNEY Mcqueen ID: [id number]       Osmolality, Serum [[id number]]  (Abnormal) Collected: 04/16/22 2151    Specimen: Blood Updated: 04/16/22 2306     Osmolality 306 mOsm/kg     Basic Metabolic Panel [[id number]]  (Abnormal) Collected: 04/16/22 2151    Specimen: Blood Updated: 04/16/22 2247     Glucose 464 mg/dL      BUN 39 mg/dL      Creatinine 2.71 mg/dL      Sodium 129 mmol/L      Potassium 4.1 mmol/L      Chloride 91 mmol/L      CO2 25.0 mmol/L      Calcium 8.2 mg/dL      BUN/Creatinine Ratio 14.4     Anion Gap 13.0 mmol/L      eGFR 19.2 mL/min/1.73      Comment: National Kidney Foundation and American Society of Nephrology (ASN) Task Force recommended calculation based on the Chronic Kidney Disease Epidemiology Collaboration (CKD-EPI) equation refit without adjustment for race.       Narrative:      GFR Normal >60  Chronic Kidney Disease <60  Kidney Failure <15      Acetone [[id number]]  (Normal) Collected: 04/16/22 2151    Specimen: Blood Updated: 04/16/22 2235     Acetone Negative    Glucose, Random [[id number]]  (Abnormal) Collected: 04/16/22 1940    Specimen: Blood Updated: 04/16/22 2049     Glucose 428 mg/dL             I reviewed the patient's new clinical results.    Assessment/Plan:     Active Hospital Problems    Diagnosis  POA   • **Admission for dialysis ([facility]) [Z99.2]  Not Applicable   • ESRD on hemodialysis (HCC) [N18.6, Z99.2]  Not Applicable   • Type 2 diabetes mellitus with autonomic neuropathy (HCC) [E11.43]  Unknown   • Anemia due to chronic kidney disease, on chronic dialysis ([facility]) [N18.6, D63.1, Z99.2]  Not Applicable   • Hypothyroidism [E03.9]  Yes   • COPD (chronic obstructive pulmonary disease) ([facility]) [J44.9]  Yes   • Mixed hyperlipidemia [E78.2]  Yes   • GERD (gastroesophageal reflux disease) [K21.9]  Yes   • Hypertension [I10]  Yes     #Admission for Dialysis  - Patient receives dialysis on M/W/F  - Non- compliant  - Missed dialysis on 4/15/2022  - Nephrology consulted for HD  - Patient  given 80 mg of lasix on admission  - Received dialysis 04/16/2022.      #ESRD on HD  - Non compliant  - Creatinine on admission is 3.34 > 3.55 > 2.96  - Baseline Cr around 3  - Continue Epogen  - Bumex hold during admission     #Type 2 DM with autonomic neuropathy  - Levemir 27 units at night, Humalog 12 units with meals  - SSI  - Rebylsus 3 mg daily added today  - Glucose check QID  - Continue gabapentin . Discussed the the plan of decreasing dosage   - Duloxetine 20 mg continued     #HTN  - Continue home lisinopril 5 mg, metoprolol 25 mg BID     #Acute anemia on CKD  - Current HB 7.2. Asymptomatic  - Monitor H/H and symptoms  - HB 7.2 > 7.4     #Hypothyrodism  - Patient reports not taking levothyroxine, restarted on admission     #COPD  - Dependent on 3 L NC  - Maintain strict sats between 88-92%  - Continue home meds of duoned Q6H, montelukast 10 mg daily     #Mixed hyperlipidemia   - Continue home atorvastatin 20 mg     #GERD  -Continue Protonix 40 mg     #CAD    - Continue plavix 75 mg, aspirin 81 mg, imdur 30 mg daily and Ranexa 500 mg BID     #Encelopathy  - Limit psychotropic meds    DVT prophylaxis:   Mechanical Order History:     None      Pharmalogical Order History:      Ordered     Dose Route Frequency Stop    04/16/22 0651  heparin (porcine) injection 4,000 Units         4,000 Units IK As Needed --    04/15/22 1706  heparin (porcine) 5000 UNIT/ML injection 5,000 Units         5,000 Units SC Every 8 Hours Scheduled --               Code status is   Code Status and Medical Interventions:   Ordered at: 04/15/22 1700     Level Of Support Discussed With:    Patient     Code Status (Patient has no pulse and is not breathing):    CPR (Attempt to Resuscitate)     Medical Interventions (Patient has pulse or is breathing):    Full Support       Plan for disposition:Where: home and When:  1-2 days      Time: 20 minutes            This document has been electronically signed by Rianna Macias MD on April 17, 2022  16:46 CDT       Part of this note may be an electronic transcription/translation of spoken language to printed text using the Dragon Dictation System.

## 2022-04-17 NOTE — PROGRESS NOTES
"[facility]  [address] [place] [address]. [address]  T - [phone]  F - [phone]     Progress Note          PATIENT  DEMOGRAPHICS   PATIENT NAME: Yamileth Slater                      PHYSICIAN: BRIDGETT Gagnon  : 1960  MRN: [id number]   LOS: 0 days    Patient Care Team:  Rianna Macias MD as PCP - General (Family Medicine)  Subjective   SUBJECTIVE   No acute events overnight. Reports some nausea after dialysis yesterday. Making some urine. SOB persists but is better. There is edema.         Objective   OBJECTIVE   Vital Signs  Temp:  [96 °F (35.6 °C)-96.7 °F (35.9 °C)] 96 °F (35.6 °C)  Heart Rate:  [68-82] 73  Resp:  [16-20] 18  BP: (133-172)/(58-85) 133/85    Flowsheet Rows    Flowsheet Row First Filed Value   Admission Height 157.5 cm (62\") Documented at 04/15/2022 1323   Admission Weight 136 kg (300 lb) Documented at 04/15/2022 1323           I/O last 3 completed shifts:  In: 480 [P.O.:480]  Out: 6600 [Urine:1600; Other:5000]    PHYSICAL EXAM    Physical Exam  Constitutional:       General: She is not in acute distress.     Appearance: She is obese. She is ill-appearing.   HENT:      Head: Normocephalic and atraumatic.   Cardiovascular:      Rate and Rhythm: Normal rate and regular rhythm.   Pulmonary:      Effort: Pulmonary effort is normal.      Breath sounds: Rales present.   Abdominal:      General: Bowel sounds are normal. There is distension.   Musculoskeletal:         General: Swelling present.      Right lower leg: Edema present.      Left lower leg: Edema present.   Skin:     General: Skin is warm and dry.      Coloration: Skin is pale.   Neurological:      Mental Status: She is alert. Mental status is at baseline.         RESULTS   Results Review:    Results from last 7 days   Lab Units 22  0639 22  2151 22  1940 22  0604 04/15/22  1340   SODIUM mmol/L 135* 129*  --  129* 130*   POTASSIUM mmol/L 4.0 4.1  --  5.0 3.6   CHLORIDE " mmol/L 97* 91*  --  92* 93*   CO2 mmol/L 26.0 25.0  --  24.0 26.0   BUN mg/dL 50* 39*  --  57* 42*   CREATININE mg/dL 2.96* 2.71*  --  3.55* 3.34*   CALCIUM mg/dL 8.5* 8.2*  --  8.5* 8.8   BILIRUBIN mg/dL 0.4  --   --  0.4 0.6   ALK PHOS U/L 200*  --   --  223* 240*   ALT (SGPT) U/L 6  --   --  8 8   AST (SGOT) U/L 7  --   --  10 13   GLUCOSE mg/dL 185* 464* 428* 321* 164*       Estimated Creatinine Clearance: 25.8 mL/min (A) (by C-G formula based on SCr of 2.96 mg/dL (H)).                Results from last 7 days   Lab Units 04/17/22  0639 04/16/22  0604 04/15/22  1340   WBC 10*3/mm3 9.77 8.58 8.08   HEMOGLOBIN g/dL 7.4* 7.4* 7.2*   PLATELETS 10*3/mm3 286 267 287               Imaging Results (Last 24 Hours)     ** No results found for the last 24 hours. **           MEDICATIONS    aspirin, 81 mg, Oral, Daily  atorvastatin, 20 mg, Oral, Daily  clopidogrel, 75 mg, Oral, Daily  DULoxetine, 20 mg, Oral, Daily  gabapentin, 100 mg, Oral, Q8H  heparin (porcine), 5,000 Units, Subcutaneous, Q8H  insulin aspart, 0-24 Units, Subcutaneous, Q3H  insulin detemir, 27 Units, Subcutaneous, Nightly  ipratropium-albuterol, 3 mL, Nebulization, 4x Daily - RT  isosorbide mononitrate, 30 mg, Oral, QAM  levothyroxine, 25 mcg, Oral, Q AM  lisinopril, 5 mg, Oral, Daily  metoprolol tartrate, 25 mg, Oral, Q12H  montelukast, 10 mg, Oral, Nightly  nystatin, , Topical, BID  oxybutynin XL, 5 mg, Oral, Daily  pantoprazole, 40 mg, Oral, Nightly  ranolazine, 500 mg, Oral, Q12H  senna-docusate sodium, 2 tablet, Oral, BID  sevelamer, 800 mg, Oral, TID With Meals  sodium chloride, 10 mL, Intravenous, Q12H           Assessment/Plan   ASSESSMENT / PLAN      Admission for dialysis ([facility])    Mixed hyperlipidemia    GERD (gastroesophageal reflux disease)    Hypertension    COPD (chronic obstructive pulmonary disease) ([facility])    Hypothyroidism    Anemia due to chronic kidney disease, on chronic dialysis ([facility])    Type 2 diabetes mellitus with autonomic  neuropathy (HCC)    ESRD on hemodialysis (HCC)    1.  ESRD on HD- Initiated HD on 10/21 due to hyperkalemia and fluid overload, now ESRD.  HD MWF at [facility].  She signed off early from dialysis on Wednesday and missed Friday session entirely.  Last HD on Saturday-5000 removed She reports some nausea at end of session.  She does make some urine on her own - 1300cc last 24 hours. Plan furosemide 80 mg on non-dialysis days. Plan next HD Monday    2. Anemia / previous GI bleed / b12 deficiency-  s/p capsule endoscopy which showed few small non-bleeding intestinal AVMs and single small polyp.She rec'd transfusion last admission in March. Current Hgb 7.4. Dr Munoz has seen the patient in the past. On epogen with dialysis     3. Procalcitonin 0.27- blood culture is pending. 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      4.  Shortness of breath/chronic respiratory failure- on oxygen and night time trilogy. Furosemide 80mg on non-dialysis days until edema is reduced.       5.  History of CAD- on Imdur, Ranexa. NTG prn. Sees Cardiology, Dr Pimentel. Current EKG reports right BBB with PVCs.     6.  HTN- Bps are acceptable- on lisinopril 5mg daily, metoprolol. There is still edema.     7. CKD/MBD- Calcium 8.5  On sevelamer 800mg tid with meals     8. Electrolyte imbalances- Na 135 and potassium 4.0- got oral KCL x 1 yesterday. Stopping this. Will modulate electrolytes with HD.                 This document has been electronically signed by BRIDGETT Gagnon on April 17, 2022 10:29 CDT

## 2022-04-17 NOTE — NURSING NOTE
Pt had glucose fsbs over 400 x 2 at start of shift, STAT glucose blood draw from lab ordered. Results still over 400. Lab is calling MD to notify. Levemir was just adjusted before this shift, new dose of 27 units to be given.

## 2022-04-18 ENCOUNTER — APPOINTMENT (OUTPATIENT)
Dept: ULTRASOUND IMAGING | Facility: HOSPITAL | Age: 62
End: 2022-04-18

## 2022-04-18 LAB
ALBUMIN SERPL-MCNC: 2.7 G/DL (ref 3.5–5.2)
ALBUMIN/GLOB SERPL: 0.6 G/DL
ALP SERPL-CCNC: 172 U/L (ref 39–117)
ALT SERPL W P-5'-P-CCNC: 7 U/L (ref 1–33)
ANION GAP SERPL CALCULATED.3IONS-SCNC: 14 MMOL/L (ref 5–15)
AST SERPL-CCNC: 9 U/L (ref 1–32)
BACTERIA SPEC AEROBE CULT: NORMAL
BASOPHILS # BLD AUTO: 0.08 10*3/MM3 (ref 0–0.2)
BASOPHILS NFR BLD AUTO: 1 % (ref 0–1.5)
BILIRUB SERPL-MCNC: 0.4 MG/DL (ref 0–1.2)
BUN SERPL-MCNC: 62 MG/DL (ref 8–23)
BUN/CREAT SERPL: 18.9 (ref 7–25)
CALCIUM SPEC-SCNC: 8.3 MG/DL (ref 8.6–10.5)
CHLORIDE SERPL-SCNC: 97 MMOL/L (ref 98–107)
CO2 SERPL-SCNC: 22 MMOL/L (ref 22–29)
CREAT SERPL-MCNC: 3.28 MG/DL (ref 0.57–1)
DEPRECATED RDW RBC AUTO: 51.5 FL (ref 37–54)
EGFRCR SERPLBLD CKD-EPI 2021: 15.2 ML/MIN/1.73
EOSINOPHIL # BLD AUTO: 0.21 10*3/MM3 (ref 0–0.4)
EOSINOPHIL NFR BLD AUTO: 2.7 % (ref 0.3–6.2)
ERYTHROCYTE [DISTWIDTH] IN BLOOD BY AUTOMATED COUNT: 16.7 % (ref 12.3–15.4)
GLOBULIN UR ELPH-MCNC: 4.3 GM/DL
GLUCOSE BLDC GLUCOMTR-MCNC: 142 MG/DL (ref 70–130)
GLUCOSE BLDC GLUCOMTR-MCNC: 150 MG/DL (ref 70–130)
GLUCOSE BLDC GLUCOMTR-MCNC: 167 MG/DL (ref 70–130)
GLUCOSE BLDC GLUCOMTR-MCNC: 202 MG/DL (ref 70–130)
GLUCOSE BLDC GLUCOMTR-MCNC: 204 MG/DL (ref 70–130)
GLUCOSE BLDC GLUCOMTR-MCNC: 208 MG/DL (ref 70–130)
GLUCOSE BLDC GLUCOMTR-MCNC: 245 MG/DL (ref 70–130)
GLUCOSE BLDC GLUCOMTR-MCNC: 250 MG/DL (ref 70–130)
GLUCOSE BLDC GLUCOMTR-MCNC: 283 MG/DL (ref 70–130)
GLUCOSE BLDC GLUCOMTR-MCNC: 305 MG/DL (ref 70–130)
GLUCOSE BLDC GLUCOMTR-MCNC: 80 MG/DL (ref 70–130)
GLUCOSE BLDC GLUCOMTR-MCNC: 87 MG/DL (ref 70–130)
GLUCOSE SERPL-MCNC: 99 MG/DL (ref 65–99)
HCT VFR BLD AUTO: 26.2 % (ref 34–46.6)
HGB BLD-MCNC: 8.1 G/DL (ref 12–15.9)
IMM GRANULOCYTES # BLD AUTO: 0.07 10*3/MM3 (ref 0–0.05)
IMM GRANULOCYTES NFR BLD AUTO: 0.9 % (ref 0–0.5)
LYMPHOCYTES # BLD AUTO: 1.92 10*3/MM3 (ref 0.7–3.1)
LYMPHOCYTES NFR BLD AUTO: 24.3 % (ref 19.6–45.3)
MCH RBC QN AUTO: 26.8 PG (ref 26.6–33)
MCHC RBC AUTO-ENTMCNC: 30.9 G/DL (ref 31.5–35.7)
MCV RBC AUTO: 86.8 FL (ref 79–97)
MONOCYTES # BLD AUTO: 0.72 10*3/MM3 (ref 0.1–0.9)
MONOCYTES NFR BLD AUTO: 9.1 % (ref 5–12)
NEUTROPHILS NFR BLD AUTO: 4.9 10*3/MM3 (ref 1.7–7)
NEUTROPHILS NFR BLD AUTO: 62 % (ref 42.7–76)
NRBC BLD AUTO-RTO: 0.3 /100 WBC (ref 0–0.2)
PLATELET # BLD AUTO: 314 10*3/MM3 (ref 140–450)
PMV BLD AUTO: 8.4 FL (ref 6–12)
POTASSIUM SERPL-SCNC: 4 MMOL/L (ref 3.5–5.2)
PROT SERPL-MCNC: 7 G/DL (ref 6–8.5)
QT INTERVAL: 438 MS
QT INTERVAL: 438 MS
QT INTERVAL: 452 MS
QTC INTERVAL: 528 MS
QTC INTERVAL: 529 MS
QTC INTERVAL: 544 MS
RBC # BLD AUTO: 3.02 10*6/MM3 (ref 3.77–5.28)
SODIUM SERPL-SCNC: 133 MMOL/L (ref 136–145)
WBC NRBC COR # BLD: 7.9 10*3/MM3 (ref 3.4–10.8)

## 2022-04-18 PROCEDURE — 94799 UNLISTED PULMONARY SVC/PX: CPT

## 2022-04-18 PROCEDURE — 94760 N-INVAS EAR/PLS OXIMETRY 1: CPT

## 2022-04-18 PROCEDURE — 96372 THER/PROPH/DIAG INJ SC/IM: CPT

## 2022-04-18 PROCEDURE — 99224 PR SBSQ OBSERVATION CARE/DAY 15 MINUTES: CPT | Performed by: STUDENT IN AN ORGANIZED HEALTH CARE EDUCATION/TRAINING PROGRAM

## 2022-04-18 PROCEDURE — 63710000001 INSULIN DETEMIR PER 5 UNITS: Performed by: STUDENT IN AN ORGANIZED HEALTH CARE EDUCATION/TRAINING PROGRAM

## 2022-04-18 PROCEDURE — G0378 HOSPITAL OBSERVATION PER HR: HCPCS

## 2022-04-18 PROCEDURE — 25010000002 HEPARIN (PORCINE) PER 1000 UNITS: Performed by: INTERNAL MEDICINE

## 2022-04-18 PROCEDURE — 93005 ELECTROCARDIOGRAM TRACING: CPT | Performed by: FAMILY MEDICINE

## 2022-04-18 PROCEDURE — 25010000002 HEPARIN (PORCINE) PER 1000 UNITS: Performed by: STUDENT IN AN ORGANIZED HEALTH CARE EDUCATION/TRAINING PROGRAM

## 2022-04-18 PROCEDURE — 85025 COMPLETE CBC W/AUTO DIFF WBC: CPT | Performed by: STUDENT IN AN ORGANIZED HEALTH CARE EDUCATION/TRAINING PROGRAM

## 2022-04-18 PROCEDURE — 93010 ELECTROCARDIOGRAM REPORT: CPT | Performed by: INTERNAL MEDICINE

## 2022-04-18 PROCEDURE — 36415 COLL VENOUS BLD VENIPUNCTURE: CPT | Performed by: STUDENT IN AN ORGANIZED HEALTH CARE EDUCATION/TRAINING PROGRAM

## 2022-04-18 PROCEDURE — G0257 UNSCHED DIALYSIS ESRD PT HOS: HCPCS

## 2022-04-18 PROCEDURE — 93970 EXTREMITY STUDY: CPT

## 2022-04-18 PROCEDURE — 93005 ELECTROCARDIOGRAM TRACING: CPT | Performed by: STUDENT IN AN ORGANIZED HEALTH CARE EDUCATION/TRAINING PROGRAM

## 2022-04-18 PROCEDURE — 82962 GLUCOSE BLOOD TEST: CPT

## 2022-04-18 PROCEDURE — 80053 COMPREHEN METABOLIC PANEL: CPT | Performed by: STUDENT IN AN ORGANIZED HEALTH CARE EDUCATION/TRAINING PROGRAM

## 2022-04-18 PROCEDURE — 25010000002 EPOETIN ALFA-EPBX 10000 UNIT/ML SOLUTION: Performed by: INTERNAL MEDICINE

## 2022-04-18 PROCEDURE — 63710000001 INSULIN ASPART PER 5 UNITS: Performed by: STUDENT IN AN ORGANIZED HEALTH CARE EDUCATION/TRAINING PROGRAM

## 2022-04-18 RX ORDER — ALBUMIN (HUMAN) 12.5 G/50ML
12.5 SOLUTION INTRAVENOUS AS NEEDED
Status: DISCONTINUED | OUTPATIENT
Start: 2022-04-18 | End: 2022-04-19 | Stop reason: HOSPADM

## 2022-04-18 RX ADMIN — RANOLAZINE 500 MG: 500 TABLET, FILM COATED, EXTENDED RELEASE ORAL at 11:14

## 2022-04-18 RX ADMIN — DOCUSATE SODIUM 50 MG AND SENNOSIDES 8.6 MG 2 TABLET: 8.6; 5 TABLET, FILM COATED ORAL at 11:14

## 2022-04-18 RX ADMIN — NYSTATIN: 100000 POWDER TOPICAL at 11:19

## 2022-04-18 RX ADMIN — GABAPENTIN 300 MG: 300 CAPSULE ORAL at 11:14

## 2022-04-18 RX ADMIN — DOCUSATE SODIUM 50 MG AND SENNOSIDES 8.6 MG 2 TABLET: 8.6; 5 TABLET, FILM COATED ORAL at 20:01

## 2022-04-18 RX ADMIN — INSULIN ASPART 4 UNITS: 100 INJECTION, SOLUTION INTRAVENOUS; SUBCUTANEOUS at 17:35

## 2022-04-18 RX ADMIN — RANOLAZINE 500 MG: 500 TABLET, FILM COATED, EXTENDED RELEASE ORAL at 20:02

## 2022-04-18 RX ADMIN — ATORVASTATIN CALCIUM 20 MG: 20 TABLET, FILM COATED ORAL at 11:14

## 2022-04-18 RX ADMIN — EPOETIN ALFA-EPBX 10000 UNITS: 10000 INJECTION, SOLUTION INTRAVENOUS; SUBCUTANEOUS at 13:31

## 2022-04-18 RX ADMIN — PANTOPRAZOLE SODIUM 40 MG: 40 TABLET, DELAYED RELEASE ORAL at 20:02

## 2022-04-18 RX ADMIN — HEPARIN SODIUM 5000 UNITS: 5000 INJECTION, SOLUTION INTRAVENOUS; SUBCUTANEOUS at 20:01

## 2022-04-18 RX ADMIN — SEVELAMER CARBONATE 800 MG: 800 TABLET, FILM COATED ORAL at 17:35

## 2022-04-18 RX ADMIN — NYSTATIN 1 APPLICATION: 100000 POWDER TOPICAL at 20:03

## 2022-04-18 RX ADMIN — Medication 10 ML: at 11:14

## 2022-04-18 RX ADMIN — CLOPIDOGREL BISULFATE 75 MG: 75 TABLET ORAL at 11:14

## 2022-04-18 RX ADMIN — IPRATROPIUM BROMIDE AND ALBUTEROL SULFATE 3 ML: .5; 3 SOLUTION RESPIRATORY (INHALATION) at 15:09

## 2022-04-18 RX ADMIN — ASPIRIN 81 MG: 81 TABLET, FILM COATED ORAL at 11:14

## 2022-04-18 RX ADMIN — SEVELAMER CARBONATE 800 MG: 800 TABLET, FILM COATED ORAL at 11:14

## 2022-04-18 RX ADMIN — OXYBUTYNIN CHLORIDE 5 MG: 5 TABLET, EXTENDED RELEASE ORAL at 11:14

## 2022-04-18 RX ADMIN — HEPARIN SODIUM 4000 UNITS: 1000 INJECTION, SOLUTION INTRAVENOUS; SUBCUTANEOUS at 13:31

## 2022-04-18 RX ADMIN — METOPROLOL TARTRATE 25 MG: 25 TABLET, FILM COATED ORAL at 20:02

## 2022-04-18 RX ADMIN — INSULIN ASPART 8 UNITS: 100 INJECTION, SOLUTION INTRAVENOUS; SUBCUTANEOUS at 20:04

## 2022-04-18 RX ADMIN — IPRATROPIUM BROMIDE AND ALBUTEROL SULFATE 3 ML: .5; 3 SOLUTION RESPIRATORY (INHALATION) at 11:39

## 2022-04-18 RX ADMIN — ORAL SEMAGLUTIDE 3 MG: 3 TABLET ORAL at 11:48

## 2022-04-18 RX ADMIN — INSULIN ASPART 8 UNITS: 100 INJECTION, SOLUTION INTRAVENOUS; SUBCUTANEOUS at 14:05

## 2022-04-18 RX ADMIN — INSULIN DETEMIR 25 UNITS: 100 INJECTION, SOLUTION SUBCUTANEOUS at 20:14

## 2022-04-18 RX ADMIN — HEPARIN SODIUM 5000 UNITS: 5000 INJECTION, SOLUTION INTRAVENOUS; SUBCUTANEOUS at 05:16

## 2022-04-18 RX ADMIN — MONTELUKAST 10 MG: 10 TABLET, FILM COATED ORAL at 20:02

## 2022-04-18 RX ADMIN — DULOXETINE HYDROCHLORIDE 20 MG: 20 CAPSULE, DELAYED RELEASE ORAL at 11:14

## 2022-04-18 RX ADMIN — Medication 10 ML: at 20:03

## 2022-04-18 NOTE — SIGNIFICANT NOTE
Spoke with telemetry 1924 hrs. report patient is back into normal rhythm and heart rate is within range.  Will update me should there be any significant changes.    Signature  Froylan Lu MD  [address], [place] [id number]  Office: [phone]      This document has been electronically signed by Froylan Lu MD on April 17, 2022 19:25 CDT

## 2022-04-18 NOTE — PLAN OF CARE
Problem: Adult Inpatient Plan of Care  Goal: Plan of Care Review  Outcome: Ongoing, Progressing  Flowsheets (Taken 4/18/2022 1552)  Plan of Care Reviewed With: patient   Goal Outcome Evaluation:  Plan of Care Reviewed With: patient            volume overload d/t missing HD session. Hx COPD, anemia, CHF, DM. Alb 2.7, labs and meds noted. ADA renal/cardiac diet w/ 1200ml fluid restriction. Pt states nkfa, no c/s problems. Reports fair appetite, unsure about UBW. # w/ BMI 54.5, indicates morbid obesity. RD following.

## 2022-04-18 NOTE — PROGRESS NOTES
"    FAMILY MEDICINE DAILY PROGRESS NOTE    NAME: Yamileth Slater  : 1960  MRN: [id number]      LOS: 0 days     PROVIDER OF SERVICE: Rianna Macias MD    Chief Complaint: Admission for dialysis ([facility])    Subjective:   Patient Seen At: 0800    Interval History:  History taken from: patient  On the morning of 22 patient reports feeling good this morning. She states that she slept well last night.   Regarding her confusion last night, she states that the nurses \"wanted to make her feel like she was confused\". Denies any confusion.     Review of Systems:   Review of Systems   Constitutional: Negative for fatigue and unexpected weight change.   Respiratory: Negative for cough, chest tightness and shortness of breath.    Cardiovascular: Negative for chest pain, palpitations and leg swelling.   Gastrointestinal: Negative for abdominal pain, constipation, diarrhea, nausea and vomiting.   Endocrine: Negative for cold intolerance, heat intolerance, polydipsia and polyuria.   Musculoskeletal: Negative for back pain, myalgias and neck pain.   Skin: Negative for rash.   Neurological: Negative for dizziness, weakness, light-headedness, numbness and headaches.       Objective:     Vital Signs  Temp:  [96.2 °F (35.7 °C)-97.9 °F (36.6 °C)] 97.5 °F (36.4 °C)  Heart Rate:  [] 82  Resp:  [18-20] 20  BP: (138-160)/(62-92) 159/74  Body mass index is 54.54 kg/m².    Physical Exam  Physical Exam  Vitals reviewed.   Constitutional:       General: She is not in acute distress.     Appearance: She is obese. She is not toxic-appearing.   HENT:      Head: Normocephalic and atraumatic.      Right Ear: External ear normal.      Left Ear: External ear normal.      Mouth/Throat:      Pharynx: Oropharynx is clear.   Eyes:      Conjunctiva/sclera: Conjunctivae normal.   Cardiovascular:      Rate and Rhythm: Normal rate and regular rhythm.   Pulmonary:      Effort: Pulmonary effort is normal. No respiratory distress.      " Breath sounds: Normal breath sounds.   Abdominal:      Palpations: Abdomen is soft.      Tenderness: There is no abdominal tenderness.   Musculoskeletal:      Right lower leg: No edema.      Left lower leg: No edema.   Skin:     General: Skin is warm and dry.   Neurological:      Mental Status: She is alert and oriented to person, place, and time. Mental status is at baseline.         Medication Review    Current Facility-Administered Medications:   •  acetaminophen (TYLENOL) tablet 650 mg, 650 mg, Oral, Q4H PRN **OR** acetaminophen (TYLENOL) 160 MG/5ML solution 650 mg, 650 mg, Oral, Q4H PRN **OR** acetaminophen (TYLENOL) suppository 650 mg, 650 mg, Rectal, Q4H PRN, Rianna Macias MD  •  albuterol (PROVENTIL) nebulizer solution 0.083% 2.5 mg/3mL, 2.5 mg, Nebulization, Q4H PRN, Rianna Macias MD  •  aspirin EC tablet 81 mg, 81 mg, Oral, Daily, Rianna Macias MD, 81 mg at 04/17/22 0837  •  atorvastatin (LIPITOR) tablet 20 mg, 20 mg, Oral, Daily, iRanna Macias MD, 20 mg at 04/17/22 0836  •  sennosides-docusate (PERICOLACE) 8.6-50 MG per tablet 2 tablet, 2 tablet, Oral, BID, 2 tablet at 04/17/22 0835 **AND** polyethylene glycol (MIRALAX) packet 17 g, 17 g, Oral, Daily PRN **AND** bisacodyl (DULCOLAX) EC tablet 5 mg, 5 mg, Oral, Daily PRN **AND** bisacodyl (DULCOLAX) suppository 10 mg, 10 mg, Rectal, Daily PRN, Rianna Macias MD  •  clopidogrel (PLAVIX) tablet 75 mg, 75 mg, Oral, Daily, Rianna Macias MD, 75 mg at 04/17/22 0837  •  dextrose (D50W) (25 g/50 mL) IV injection 25 g, 25 g, Intravenous, Q15 Min PRN, Eduin Griffin MD  •  dextrose (GLUTOSE) oral gel 15 g, 15 g, Oral, Q15 Min PRN, Eduin Griffin MD  •  DULoxetine (CYMBALTA) DR capsule 20 mg, 20 mg, Oral, Daily, Rianna Macias MD, 20 mg at 04/17/22 0836  •  furosemide (LASIX) tablet 80 mg, 80 mg, Oral, Once per day on Sun Tue Thu Sat, Janice Chavez APRN, 80 mg at 04/17/22 1150  •  gabapentin (NEURONTIN) capsule 300 mg, 300 mg, Oral, Q12H, Froylan Lu  MD ISAIAS, 300 mg at 04/17/22 2028  •  glucagon (human recombinant) (GLUCAGEN DIAGNOSTIC) injection 1 mg, 1 mg, Intramuscular, Q15 Min PRN, Eduin Griffin MD  •  heparin (porcine) 5000 UNIT/ML injection 5,000 Units, 5,000 Units, Subcutaneous, Q8H, Rianna Macias MD, 5,000 Units at 04/18/22 0516  •  heparin (porcine) injection 4,000 Units, 4,000 Units, Intracatheter, PRN, Ace Lauren MD, 4,000 Units at 04/16/22 0821  •  HYDROcodone-acetaminophen (NORCO) 5-325 MG per tablet 1 tablet, 1 tablet, Oral, Q4H PRN, Rianna Macias MD  •  insulin aspart (novoLOG) injection 0-24 Units, 0-24 Units, Subcutaneous, Q4H, Eduin Griffin MD  •  insulin detemir (LEVEMIR) injection 25 Units, 25 Units, Subcutaneous, Nightly, Eduin Griffin MD  •  ipratropium-albuterol (DUO-NEB) nebulizer solution 3 mL, 3 mL, Nebulization, 4x Daily - RT, Rianna Macias MD, 3 mL at 04/17/22 1947  •  isosorbide mononitrate (IMDUR) 24 hr tablet 30 mg, 30 mg, Oral, QAM, Rianna Macias MD, 30 mg at 04/17/22 0552  •  levothyroxine (SYNTHROID, LEVOTHROID) tablet 25 mcg, 25 mcg, Oral, Q AM, Rianna Macias MD, 25 mcg at 04/17/22 0552  •  lisinopril (PRINIVIL,ZESTRIL) tablet 5 mg, 5 mg, Oral, Daily, Rianna Macias MD, 5 mg at 04/17/22 0836  •  metoprolol tartrate (LOPRESSOR) tablet 25 mg, 25 mg, Oral, Q12H, Rianna Macias MD, 25 mg at 04/17/22 2027  •  montelukast (SINGULAIR) tablet 10 mg, 10 mg, Oral, Nightly, Rianna Macias MD, 10 mg at 04/17/22 2027  •  nystatin (MYCOSTATIN) powder, , Topical, BID, Richards, [place] K, MD, Given at 04/17/22 2029  •  ondansetron (ZOFRAN) tablet 4 mg, 4 mg, Oral, Q6H PRN, Rianna Macias MD  •  oxybutynin XL (DITROPAN-XL) 24 hr tablet 5 mg, 5 mg, Oral, Daily, Rianna Macias MD, 5 mg at 04/17/22 0835  •  pantoprazole (PROTONIX) EC tablet 40 mg, 40 mg, Oral, Nightly, Rianna Macias MD, 40 mg at 04/17/22 2028  •  Pharmacy Consult, , Does not apply, Continuous PRN, Eduin Griffin MD  •  ranolazine (RANEXA) 12 hr tablet 500 mg, 500  mg, Oral, Q12H, Rianna Macias MD, 500 mg at 04/17/22 2030  •  Semaglutide tablet 3 mg, 3 mg, Oral, Daily, LeEduin MD, 3 mg at 04/17/22 1529  •  sevelamer (RENVELA) tablet 800 mg, 800 mg, Oral, TID With Meals, Rianna Macias MD, 800 mg at 04/17/22 1741  •  sodium chloride 0.9 % flush 10 mL, 10 mL, Intravenous, PRN, Rianna Macias MD  •  sodium chloride 0.9 % flush 10 mL, 10 mL, Intravenous, Q12H, Rianna Macias MD, 10 mL at 04/17/22 2030  •  sodium chloride 0.9 % flush 10 mL, 10 mL, Intravenous, PRN, Rianna Macias MD     Diagnostic Data    Lab Results (last 24 hours)     Procedure Component Value Units Date/Time    Urine Culture - Urine, Urine, Clean Catch [[id number]] Collected: 04/17/22 0907    Specimen: Urine, Clean Catch Updated: 04/18/22 0901     Urine Culture <10,000 CFU/mL Normal Urogenital Sushma    POC Glucose Once [[id number]]  (Abnormal) Collected: 04/18/22 0711    Specimen: Blood Updated: 04/18/22 0730     Glucose 142 mg/dL      Comment: RN NotifiedOperator: [id number] RAHEEMCRISTHIANJOSH EARNESTMariaelena ID: [id number]       POC Glucose Once [[id number]]  (Normal) Collected: 04/18/22 0515    Specimen: Blood Updated: 04/18/22 0728     Glucose 87 mg/dL      Comment: : [id number] MORENITA Blackburn ID: [id number]       Comprehensive Metabolic Panel [[id number]]  (Abnormal) Collected: 04/18/22 0626    Specimen: Blood Updated: 04/18/22 0728     Glucose 99 mg/dL      BUN 62 mg/dL      Creatinine 3.28 mg/dL      Sodium 133 mmol/L      Potassium 4.0 mmol/L      Chloride 97 mmol/L      CO2 22.0 mmol/L      Calcium 8.3 mg/dL      Total Protein 7.0 g/dL      Albumin 2.70 g/dL      ALT (SGPT) 7 U/L      AST (SGOT) 9 U/L      Alkaline Phosphatase 172 U/L      Total Bilirubin 0.4 mg/dL      Globulin 4.3 gm/dL      A/G Ratio 0.6 g/dL      BUN/Creatinine Ratio 18.9     Anion Gap 14.0 mmol/L      eGFR 15.2 mL/min/1.73      Comment: National Kidney Foundation and American Society of Nephrology (ASN) Task Force  recommended calculation based on the Chronic Kidney Disease Epidemiology Collaboration (CKD-EPI) equation refit without adjustment for race.       Narrative:      GFR Normal >60  Chronic Kidney Disease <60  Kidney Failure <15      CBC & Differential [[id number]]  (Abnormal) Collected: 04/18/22 0626    Specimen: Blood Updated: 04/18/22 0714    Narrative:      The following orders were created for panel order CBC & Differential.  Procedure                               Abnormality         Status                     ---------                               -----------         ------                     CBC Auto Differential[[id number]]        Abnormal            Final result                 Please view results for these tests on the individual orders.    CBC Auto Differential [[id number]]  (Abnormal) Collected: 04/18/22 0626    Specimen: Blood Updated: 04/18/22 0714     WBC 7.90 10*3/mm3      RBC 3.02 10*6/mm3      Hemoglobin 8.1 g/dL      Hematocrit 26.2 %      MCV 86.8 fL      MCH 26.8 pg      MCHC 30.9 g/dL      RDW 16.7 %      RDW-SD 51.5 fl      MPV 8.4 fL      Platelets 314 10*3/mm3      Neutrophil % 62.0 %      Lymphocyte % 24.3 %      Monocyte % 9.1 %      Eosinophil % 2.7 %      Basophil % 1.0 %      Immature Grans % 0.9 %      Neutrophils, Absolute 4.90 10*3/mm3      Lymphocytes, Absolute 1.92 10*3/mm3      Monocytes, Absolute 0.72 10*3/mm3      Eosinophils, Absolute 0.21 10*3/mm3      Basophils, Absolute 0.08 10*3/mm3      Immature Grans, Absolute 0.07 10*3/mm3      nRBC 0.3 /100 WBC     POC Glucose Once [[id number]]  (Abnormal) Collected: 04/17/22 2317    Specimen: Blood Updated: 04/18/22 0449     Glucose 167 mg/dL      Comment: RN NotifiedOperator: [id number] MORENITA CALLAHANMeter ID: [id number]       POC Glucose Once [[id number]]  (Abnormal) Collected: 04/17/22 2018    Specimen: Blood Updated: 04/18/22 0449     Glucose 305 mg/dL      Comment: Result Not ConfirmedOperator: [id number] MORENITA CALLAHANMeter ID:  [id number]       POC Glucose Once [[id number]]  (Abnormal) Collected: 04/17/22 1917    Specimen: Blood Updated: 04/18/22 0448     Glucose 283 mg/dL      Comment: RN NotifiedOperator: [id number] VIANNEY LINeter ID: [id number]       POC Glucose Once [[id number]]  (Abnormal) Collected: 04/17/22 0729    Specimen: Blood Updated: 04/18/22 0447     Glucose 250 mg/dL      Comment: RN NotifiedOperator: [id number] RICHA Chaemma ID: [id number]       POC Glucose Once [[id number]]  (Normal) Collected: 04/18/22 0230    Specimen: Blood Updated: 04/18/22 0243     Glucose 80 mg/dL      Comment: : [id number] MORENITA CALLAHANMeter ID: [id number]       POC Glucose Once [[id number]]  (Abnormal) Collected: 04/16/22 2133    Specimen: Blood Updated: 04/17/22 2104     Glucose 440 mg/dL      Comment: RN NotifiedOperator: [id number] MORENITA CALLAHANMeter ID: [id number]       POC Glucose Once [[id number]]  (Abnormal) Collected: 04/16/22 1913    Specimen: Blood Updated: 04/17/22 2104     Glucose 449 mg/dL      Comment: RN NotifiedOperator: [id number] VIANNEY LINeter ID: [id number]       POC Glucose Once [[id number]]  (Abnormal) Collected: 04/16/22 1912    Specimen: Blood Updated: 04/17/22 2104     Glucose 413 mg/dL      Comment: RN NotifiedOperator: [id number] VIANNEY LINeter ID: [id number]       POC Glucose Once [[id number]]  (Abnormal) Collected: 04/16/22 1837    Specimen: Blood Updated: 04/17/22 2104     Glucose 439 mg/dL      Comment: RN NotifiedOperator: [id number] SHERRI Stephens ID: [id number]       POC Glucose Once [[id number]]  (Abnormal) Collected: 04/16/22 1752    Specimen: Blood Updated: 04/17/22 2104     Glucose 431 mg/dL      Comment: RN NotifiedOperator: [id number] SHERRI Stephens ID: [id number]       POC Glucose Once [[id number]]  (Abnormal) Collected: 04/16/22 1751    Specimen: Blood Updated: 04/17/22 2104     Glucose 434 mg/dL      Comment: RN NotifiedOperator: [id number] SHERRI Stephens ID:  [id number]       POC Glucose Once [[id number]]  (Abnormal) Collected: 04/16/22 1718    Specimen: Blood Updated: 04/17/22 2103     Glucose 403 mg/dL      Comment: RN NotifiedOperator: [id number] JUSTYN NATHANMeter ID: [id number]       POC Glucose Once [[id number]]  (Abnormal) Collected: 04/16/22 1714    Specimen: Blood Updated: 04/17/22 2103     Glucose 439 mg/dL      Comment: RN NotifiedOperator: [id number] JUSTYN NATHANMeter ID: [id number]       Troponin [[id number]]  (Normal) Collected: 04/17/22 2002    Specimen: Blood Updated: 04/17/22 2049     Troponin T 0.016 ng/mL     Narrative:      Troponin T Reference Range:  <= 0.03 ng/mL-   Negative for AMI  >0.03 ng/mL-     Abnormal for myocardial necrosis.  Clinicians would have to utilize clinical acumen, EKG, Troponin and serial changes to determine if it is an Acute Myocardial Infarction or myocardial injury due to an underlying chronic condition.       Results may be falsely decreased if patient taking Biotin.      Comprehensive Metabolic Panel [[id number]]  (Abnormal) Collected: 04/17/22 1803    Specimen: Blood Updated: 04/17/22 1848     Glucose 276 mg/dL      BUN 54 mg/dL      Creatinine 3.42 mg/dL      Sodium 128 mmol/L      Potassium 4.3 mmol/L      Chloride 91 mmol/L      CO2 24.0 mmol/L      Calcium 8.8 mg/dL      Total Protein 7.4 g/dL      Albumin 3.50 g/dL      ALT (SGPT) 6 U/L      AST (SGOT) 9 U/L      Alkaline Phosphatase 216 U/L      Total Bilirubin 0.4 mg/dL      Globulin 3.9 gm/dL      A/G Ratio 0.9 g/dL      BUN/Creatinine Ratio 15.8     Anion Gap 13.0 mmol/L      eGFR 14.5 mL/min/1.73      Comment: <15 Indicative of kidney failure       Narrative:      GFR Normal >60  Chronic Kidney Disease <60  Kidney Failure <15      Troponin [[id number]]  (Normal) Collected: 04/17/22 1803    Specimen: Blood Updated: 04/17/22 1848     Troponin T 0.019 ng/mL     Narrative:      Troponin T Reference Range:  <= 0.03 ng/mL-   Negative for AMI  >0.03 ng/mL-     Abnormal  for myocardial necrosis.  Clinicians would have to utilize clinical acumen, EKG, Troponin and serial changes to determine if it is an Acute Myocardial Infarction or myocardial injury due to an underlying chronic condition.       Results may be falsely decreased if patient taking Biotin.      Magnesium [[id number]]  (Normal) Collected: 04/17/22 1803    Specimen: Blood Updated: 04/17/22 1848     Magnesium 2.2 mg/dL     POC Glucose Once [[id number]]  (Abnormal) Collected: 04/17/22 1702    Specimen: Blood Updated: 04/17/22 1723     Glucose 263 mg/dL      Comment: RN NotifiedOperator: [id number] RICHA Welch ID: [id number]       Blood Culture - Blood, Arm, Left [[id number]]  (Normal) Collected: 04/15/22 1613    Specimen: Blood from Arm, Left Updated: 04/17/22 1632     Blood Culture No growth at 2 days    Blood Culture - Blood, Arm, Left [[id number]]  (Normal) Collected: 04/15/22 1538    Specimen: Blood from Arm, Left Updated: 04/17/22 1545     Blood Culture No growth at 2 days    POC Glucose Once [[id number]]  (Abnormal) Collected: 04/17/22 1425    Specimen: Blood Updated: 04/17/22 1437     Glucose 237 mg/dL      Comment: RN NotifiedOperator: [id number] SHERRI Stephens ID: [id number]       POC Glucose Once [[id number]]  (Abnormal) Collected: 04/17/22 1018    Specimen: Blood Updated: 04/17/22 1058     Glucose 171 mg/dL      Comment: RN NotifiedOperator: [id number] RICHA Welch ID: [id number]       POC Glucose Once [[id number]]  (Abnormal) Collected: 04/17/22 0841    Specimen: Blood Updated: 04/17/22 1058     Glucose 143 mg/dL      Comment: RN NotifiedOperator: [id number] SHERRI Stephens ID: [id number]               I reviewed the patient's new clinical results.    Assessment/Plan:     Active Hospital Problems    Diagnosis  POA   • **Admission for dialysis (HCC) [Z99.2]  Not Applicable   • ESRD on hemodialysis (HCC) [N18.6, Z99.2]  Not Applicable   • Type 2 diabetes mellitus with autonomic neuropathy (HCC)  [E11.43]  Unknown   • Anemia due to chronic kidney disease, on chronic dialysis ([facility]) [N18.6, D63.1, Z99.2]  Not Applicable   • Hypothyroidism [E03.9]  Yes   • COPD (chronic obstructive pulmonary disease) ([facility]) [J44.9]  Yes   • Mixed hyperlipidemia [E78.2]  Yes   • GERD (gastroesophageal reflux disease) [K21.9]  Yes   • Hypertension [I10]  Yes     #Admission for Dialysis  - Patient receives dialysis on M/W/F  - Non- compliant  - Missed dialysis on 4/15/2022  - Nephrology consulted for HD  - Patient given 80 mg of lasix on admission  - Received dialysis 04/16/2022, due for dialysis today     #ESRD on HD  - Non compliant  - Creatinine on admission is 3.34 > 3.55 > 2.96 > 3.28  - Baseline Cr around 3  - Continue Epogen  - Bumex hold during admission     #Type 2 DM with autonomic neuropathy  - Levemir 25 units at night, Humalog 12 units with meals  - SSI  - Rebylsus 3 mg daily added today  - Glucose check QID  - Continue gabapentin . Discussed the the plan of decreasing dosage   - Duloxetine 20 mg continued     #HTN  - Continue home lisinopril 5 mg, metoprolol 25 mg BID     #Acute anemia on CKD  - Current HB 8.1 Asymptomatic  - Monitor H/H and symptoms  - HB 7.2 > 7.4     #Hypothyrodism  - Patient reports not taking levothyroxine, restarted on admission     #COPD  - Dependent on 3 L NC  - Maintain strict sats between 88-92%  - Continue home meds of duoned Q6H, montelukast 10 mg daily     #Mixed hyperlipidemia   - Continue home atorvastatin 20 mg     #GERD  -Continue Protonix 40 mg     #CAD    - Continue plavix 75 mg, aspirin 81 mg, imdur 30 mg daily and Ranexa 500 mg BID     #Encelopathy  - Limit psychotropic meds    DVT prophylaxis:   Mechanical Order History:     None      Pharmalogical Order History:      Ordered     Dose Route Frequency Stop    04/16/22 0651  heparin (porcine) injection 4,000 Units         4,000 Units IK As Needed --    04/15/22 1706  heparin (porcine) 5000 UNIT/ML injection 5,000 Units          5,000 Units SC Every 8 Hours Scheduled --               Code status is   Code Status and Medical Interventions:   Ordered at: 04/15/22 1700     Level Of Support Discussed With:    Patient     Code Status (Patient has no pulse and is not breathing):    CPR (Attempt to Resuscitate)     Medical Interventions (Patient has pulse or is breathing):    Full Support       Plan for disposition:Where: home, when: possible today      Time: 20 minutes            This document has been electronically signed by Rianna Macias MD on April 18, 2022 09:30 CDT       Part of this note may be an electronic transcription/translation of spoken language to printed text using the Dragon Dictation System.

## 2022-04-18 NOTE — SIGNIFICANT NOTE
04/18/22 0943   OTHER   Discipline physical therapist   Rehab Time/Intention   Session Not Performed patient/family declined evaluation  (Attempting to see pt for PT evaluation, however pt deferring eval at this time. Pt requesting PT to check back later. Will f/u as time allows.)

## 2022-04-18 NOTE — PROGRESS NOTES
Adult Nutrition  Assessment    Patient Name:  Yamileth Slater  YOB: 1960  MRN: [id number]  Admit Date:  4/15/2022    Assessment Date:  4/18/2022    Comments:  61yo female admit w/ volume overload d/t missing HD session. Hx COPD, anemia, CHF, DM. Alb 2.7, labs and meds noted. ADA renal/cardiac diet w/ 1200ml fluid restriction. Pt states nkfa, no c/s problems. Reports fair appetite, unsure about UBW. # w/ BMI 54.5, indicates morbid obesity. RD to follow hospital course.        Reason for Assessment     Row Name 04/18/22 1537          Reason for Assessment    Reason For Assessment identified at risk by screening criteria     Diagnosis fluid status;renal disease     Identified At Risk by Screening Criteria large or nonhealing wound, burn or pressure injury                Nutrition/Diet History     Row Name 04/18/22 1537          Nutrition/Diet History    Typical Intake (Food/Fluid/EN/PN) Pt states nkfa, no c/s problems. RReports fair appetite, unsure about UBW                Anthropometrics     Row Name 04/18/22 1538          Anthropometrics    Weight for Calculation 71.2 kg (157 lb)  used adj IBW--#                Labs/Tests/Procedures/Meds     Row Name 04/18/22 1538          Labs/Procedures/Meds    Lab Results Reviewed reviewed     Lab Results Comments Glu 202, GFR 15L, alb 2.7L            Diagnostic Tests/Procedures    Diagnostic Test/Procedure Reviewed reviewed            Medications    Pertinent Medications Reviewed reviewed     Pertinent Medications Comments lasix non-HD days, SSI,  levemir 25u hs, bowel meds                  Estimated/Assessed Needs - Anthropometrics     Row Name 04/18/22 1538          Anthropometrics    Weight for Calculation 71.2 kg (157 lb)  used adj IBW--#            Estimated/Assessed Needs    Additional Documentation Fluid Requirements (Group);Estimated Calorie Needs (Group);KCAL/KG (Group);Protein Requirements (Group)            Estimated Calorie  Needs    Estimated Calorie Requirement (kcal/day) 1800            KCAL/KG    KCAL/KG 25 Kcal/Kg (kcal)     25 Kcal/Kg (kcal) 1780.375            Protein Requirements    Weight Used For Protein Calculations 71.2 kg (157 lb)     Est Protein Requirement Amount (gms/kg) 1.0 gm protein     Estimated Protein Requirements (gms/day) 71.22            Fluid Requirements    Fluid Requirements (mL/day) 1800     RDA Method (mL) 1800                Nutrition Prescription Ordered     Row Name 04/18/22 1540          Nutrition Prescription PO    Current PO Diet Regular     Common Modifiers Cardiac;Consistent Carbohydrate;Renal;Fluid Restriction     Fluid Restriction mL per Day --  1200                Evaluation of Received Nutrient/Fluid Intake     Row Name 04/18/22 1541          PO Evaluation    % PO Intake % meals                     Electronically signed by:  Roma Simms RD  04/18/22 15:42 CDT

## 2022-04-18 NOTE — PROGRESS NOTES
"[facility]  [address] [place] [address]. [address]  T - [phone]  F - [phone]     Progress Note          PATIENT  DEMOGRAPHICS   PATIENT NAME: Yamileth Slater                      PHYSICIAN: Ace Lauren MD  : 1960  MRN: [id number]   LOS: 0 days    Patient Care Team:  Rianna Macias MD as PCP - General (Family Medicine)  Subjective   SUBJECTIVE   No acute events overnight. soa is better       Objective   OBJECTIVE   Vital Signs  Temp:  [96.2 °F (35.7 °C)-98 °F (36.7 °C)] 98 °F (36.7 °C)  Heart Rate:  [] 103  Resp:  [18-20] 18  BP: (139-164)/(67-92) 164/79    Flowsheet Rows    Flowsheet Row First Filed Value   Admission Height 157.5 cm (62\") Documented at 04/15/2022 1323   Admission Weight 136 kg (300 lb) Documented at 04/15/2022 1323           I/O last 3 completed shifts:  In: 360 [P.O.:360]  Out: 3800 [Urine:3800]    PHYSICAL EXAM    Physical Exam  Constitutional:       General: She is not in acute distress.     Appearance: She is obese. She is ill-appearing.   HENT:      Head: Normocephalic and atraumatic.   Cardiovascular:      Rate and Rhythm: Normal rate and regular rhythm.   Pulmonary:      Effort: Pulmonary effort is normal.      Breath sounds: Rales present.   Abdominal:      General: Bowel sounds are normal. There is distension.   Musculoskeletal:         General: Swelling present.      Right lower leg: Edema present.      Left lower leg: Edema present.   Skin:     General: Skin is warm and dry.      Coloration: Skin is pale.   Neurological:      Mental Status: She is alert. Mental status is at baseline.         RESULTS   Results Review:    Results from last 7 days   Lab Units 22  0626 22  1803 22  0639   SODIUM mmol/L 133* 128* 135*   POTASSIUM mmol/L 4.0 4.3 4.0   CHLORIDE mmol/L 97* 91* 97*   CO2 mmol/L 22.0 24.0 26.0   BUN mg/dL 62* 54* 50*   CREATININE mg/dL 3.28* 3.42* 2.96*   CALCIUM mg/dL 8.3* 8.8 8.5*   BILIRUBIN mg/dL 0.4 " 0.4 0.4   ALK PHOS U/L 172* 216* 200*   ALT (SGPT) U/L 7 6 6   AST (SGOT) U/L 9 9 7   GLUCOSE mg/dL 99 276* 185*       Estimated Creatinine Clearance: 23.3 mL/min (A) (by C-G formula based on SCr of 3.28 mg/dL (H)).    Results from last 7 days   Lab Units 04/17/22  1803   MAGNESIUM mg/dL 2.2             Results from last 7 days   Lab Units 04/18/22  0626 04/17/22  0639 04/16/22  0604 04/15/22  1340   WBC 10*3/mm3 7.90 9.77 8.58 8.08   HEMOGLOBIN g/dL 8.1* 7.4* 7.4* 7.2*   PLATELETS 10*3/mm3 314 286 267 287               Imaging Results (Last 24 Hours)     ** No results found for the last 24 hours. **           MEDICATIONS    aspirin, 81 mg, Oral, Daily  atorvastatin, 20 mg, Oral, Daily  clopidogrel, 75 mg, Oral, Daily  DULoxetine, 20 mg, Oral, Daily  furosemide, 80 mg, Oral, Once per day on Sun Tue Thu Sat  gabapentin, 300 mg, Oral, Q12H  heparin (porcine), 5,000 Units, Subcutaneous, Q8H  insulin aspart, 0-24 Units, Subcutaneous, Q4H  insulin detemir, 25 Units, Subcutaneous, Nightly  ipratropium-albuterol, 3 mL, Nebulization, 4x Daily - RT  isosorbide mononitrate, 30 mg, Oral, QAM  levothyroxine, 25 mcg, Oral, Q AM  lisinopril, 5 mg, Oral, Daily  metoprolol tartrate, 25 mg, Oral, Q12H  montelukast, 10 mg, Oral, Nightly  nystatin, , Topical, BID  oxybutynin XL, 5 mg, Oral, Daily  pantoprazole, 40 mg, Oral, Nightly  ranolazine, 500 mg, Oral, Q12H  Semaglutide, 3 mg, Oral, Daily  senna-docusate sodium, 2 tablet, Oral, BID  sevelamer, 800 mg, Oral, TID With Meals  sodium chloride, 10 mL, Intravenous, Q12H      Pharmacy Consult,         Assessment/Plan   ASSESSMENT / PLAN      Admission for dialysis (HCC)    Mixed hyperlipidemia    GERD (gastroesophageal reflux disease)    Hypertension    COPD (chronic obstructive pulmonary disease) (HCC)    Hypothyroidism    Anemia due to chronic kidney disease, on chronic dialysis (HCC)    Type 2 diabetes mellitus with autonomic neuropathy (HCC)    ESRD on hemodialysis (HCC)    1.   ESRD on HD- Initiated HD on 10/21 due to hyperkalemia and fluid overload, now ESRD.  HD MWF at [facility].  She signed off early from dialysis on Wednesday and missed Friday session entirely.  Last HD on Saturday-5000 removed She reports some nausea at end of session.  She does make some urine on her own. Plan furosemide 80 mg on non-dialysis days. Hd today    2. Anemia / previous GI bleed / b12 deficiency-  s/p capsule endoscopy which showed few small non-bleeding intestinal AVMs and single small polyp.She rec'd transfusion last admission in March. Current Hgb >8. Dr Munoz has seen the patient in the past. On epogen with dialysis     3. Procalcitonin 0.27- blood culture is pending. 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      4.  Shortness of breath/chronic respiratory failure- on oxygen and night time trilogy. Furosemide 80mg on non-dialysis days until edema is reduced.       5.  History of CAD- on Imdur, Ranexa. NTG prn. Sees Cardiology, Dr Pimentel. Current EKG reports right BBB with PVCs.     6.  HTN- Bps are acceptable- on lisinopril 5mg daily, metoprolol. There is still edema.     7. CKD/MBD- Calcium 8.5  On sevelamer 800mg tid with meals     8. Electrolyte imbalances- k stable               This document has been electronically signed by Ace Lauren MD on April 18, 2022 11:34 CDT

## 2022-04-18 NOTE — PLAN OF CARE
Goal Outcome Evaluation:      VSS. Pt had dialysis today and 5 L removed. Held 1400 heparin per Md order. Pt educated on falls risk due to pt turning off alarms to move about the room. Monitoring BS q4hr with coverage per order.

## 2022-04-18 NOTE — PLAN OF CARE
Goal Outcome Evaluation:    Pt was given pm meds at 20:28 and was alert and oriented. Around 21:45 CNA notified RN that pt had become confused much like our previous night. She thought she was leaving and someone was on the way to get her. When RN entered room pt was sitting on side of bed. She had removed the pads and pur wick, turned off her own bed alarm, voided in the floor and on the bed, and was refusing to lay roxi so we could provide care to clean her up. With some redirection she became more cooperative but confusion and impulsiveness has continued through shift.

## 2022-04-19 ENCOUNTER — READMISSION MANAGEMENT (OUTPATIENT)
Dept: CALL CENTER | Facility: HOSPITAL | Age: 62
End: 2022-04-19

## 2022-04-19 VITALS
DIASTOLIC BLOOD PRESSURE: 72 MMHG | BODY MASS INDEX: 53.92 KG/M2 | OXYGEN SATURATION: 98 % | TEMPERATURE: 96.8 F | RESPIRATION RATE: 18 BRPM | HEIGHT: 62 IN | SYSTOLIC BLOOD PRESSURE: 158 MMHG | WEIGHT: 293 LBS | HEART RATE: 81 BPM

## 2022-04-19 LAB
ALBUMIN SERPL-MCNC: 3.1 G/DL (ref 3.5–5.2)
ALBUMIN/GLOB SERPL: 0.9 G/DL
ALP SERPL-CCNC: 170 U/L (ref 39–117)
ALT SERPL W P-5'-P-CCNC: 8 U/L (ref 1–33)
ANION GAP SERPL CALCULATED.3IONS-SCNC: 11 MMOL/L (ref 5–15)
AST SERPL-CCNC: 10 U/L (ref 1–32)
BASOPHILS # BLD AUTO: 0.07 10*3/MM3 (ref 0–0.2)
BASOPHILS NFR BLD AUTO: 1 % (ref 0–1.5)
BILIRUB SERPL-MCNC: 0.4 MG/DL (ref 0–1.2)
BUN SERPL-MCNC: 39 MG/DL (ref 8–23)
BUN/CREAT SERPL: 13.8 (ref 7–25)
CALCIUM SPEC-SCNC: 8.6 MG/DL (ref 8.6–10.5)
CHLORIDE SERPL-SCNC: 98 MMOL/L (ref 98–107)
CO2 SERPL-SCNC: 26 MMOL/L (ref 22–29)
CREAT SERPL-MCNC: 2.83 MG/DL (ref 0.57–1)
DEPRECATED RDW RBC AUTO: 51.1 FL (ref 37–54)
EGFRCR SERPLBLD CKD-EPI 2021: 18.2 ML/MIN/1.73
EOSINOPHIL # BLD AUTO: 0.19 10*3/MM3 (ref 0–0.4)
EOSINOPHIL NFR BLD AUTO: 2.6 % (ref 0.3–6.2)
ERYTHROCYTE [DISTWIDTH] IN BLOOD BY AUTOMATED COUNT: 16.2 % (ref 12.3–15.4)
GLOBULIN UR ELPH-MCNC: 3.3 GM/DL
GLUCOSE BLDC GLUCOMTR-MCNC: 125 MG/DL (ref 70–130)
GLUCOSE BLDC GLUCOMTR-MCNC: 159 MG/DL (ref 70–130)
GLUCOSE BLDC GLUCOMTR-MCNC: 191 MG/DL (ref 70–130)
GLUCOSE BLDC GLUCOMTR-MCNC: 233 MG/DL (ref 70–130)
GLUCOSE BLDC GLUCOMTR-MCNC: 267 MG/DL (ref 70–130)
GLUCOSE SERPL-MCNC: 147 MG/DL (ref 65–99)
HCT VFR BLD AUTO: 28.6 % (ref 34–46.6)
HGB BLD-MCNC: 8.7 G/DL (ref 12–15.9)
IMM GRANULOCYTES # BLD AUTO: 0.09 10*3/MM3 (ref 0–0.05)
IMM GRANULOCYTES NFR BLD AUTO: 1.2 % (ref 0–0.5)
LYMPHOCYTES # BLD AUTO: 1.73 10*3/MM3 (ref 0.7–3.1)
LYMPHOCYTES NFR BLD AUTO: 23.5 % (ref 19.6–45.3)
MCH RBC QN AUTO: 26.5 PG (ref 26.6–33)
MCHC RBC AUTO-ENTMCNC: 30.4 G/DL (ref 31.5–35.7)
MCV RBC AUTO: 87.2 FL (ref 79–97)
MONOCYTES # BLD AUTO: 0.66 10*3/MM3 (ref 0.1–0.9)
MONOCYTES NFR BLD AUTO: 9 % (ref 5–12)
NEUTROPHILS NFR BLD AUTO: 4.61 10*3/MM3 (ref 1.7–7)
NEUTROPHILS NFR BLD AUTO: 62.7 % (ref 42.7–76)
NRBC BLD AUTO-RTO: 0.3 /100 WBC (ref 0–0.2)
PLATELET # BLD AUTO: 290 10*3/MM3 (ref 140–450)
PMV BLD AUTO: 8.5 FL (ref 6–12)
POTASSIUM SERPL-SCNC: 3.6 MMOL/L (ref 3.5–5.2)
PROT SERPL-MCNC: 6.4 G/DL (ref 6–8.5)
QT INTERVAL: 326 MS
QT INTERVAL: 370 MS
QTC INTERVAL: 439 MS
QTC INTERVAL: 498 MS
RBC # BLD AUTO: 3.28 10*6/MM3 (ref 3.77–5.28)
SODIUM SERPL-SCNC: 135 MMOL/L (ref 136–145)
WBC NRBC COR # BLD: 7.35 10*3/MM3 (ref 3.4–10.8)

## 2022-04-19 PROCEDURE — 99214 OFFICE O/P EST MOD 30 MIN: CPT | Performed by: INTERNAL MEDICINE

## 2022-04-19 PROCEDURE — 80053 COMPREHEN METABOLIC PANEL: CPT | Performed by: STUDENT IN AN ORGANIZED HEALTH CARE EDUCATION/TRAINING PROGRAM

## 2022-04-19 PROCEDURE — 94760 N-INVAS EAR/PLS OXIMETRY 1: CPT

## 2022-04-19 PROCEDURE — G0378 HOSPITAL OBSERVATION PER HR: HCPCS

## 2022-04-19 PROCEDURE — 99217 PR OBSERVATION CARE DISCHARGE MANAGEMENT: CPT | Performed by: STUDENT IN AN ORGANIZED HEALTH CARE EDUCATION/TRAINING PROGRAM

## 2022-04-19 PROCEDURE — 94799 UNLISTED PULMONARY SVC/PX: CPT

## 2022-04-19 PROCEDURE — 97162 PT EVAL MOD COMPLEX 30 MIN: CPT

## 2022-04-19 PROCEDURE — 25010000002 HEPARIN (PORCINE) PER 1000 UNITS: Performed by: STUDENT IN AN ORGANIZED HEALTH CARE EDUCATION/TRAINING PROGRAM

## 2022-04-19 PROCEDURE — 63710000001 INSULIN ASPART PER 5 UNITS: Performed by: STUDENT IN AN ORGANIZED HEALTH CARE EDUCATION/TRAINING PROGRAM

## 2022-04-19 PROCEDURE — 82962 GLUCOSE BLOOD TEST: CPT

## 2022-04-19 PROCEDURE — 96372 THER/PROPH/DIAG INJ SC/IM: CPT

## 2022-04-19 PROCEDURE — 36415 COLL VENOUS BLD VENIPUNCTURE: CPT | Performed by: STUDENT IN AN ORGANIZED HEALTH CARE EDUCATION/TRAINING PROGRAM

## 2022-04-19 PROCEDURE — 94664 DEMO&/EVAL PT USE INHALER: CPT

## 2022-04-19 PROCEDURE — 85025 COMPLETE CBC W/AUTO DIFF WBC: CPT | Performed by: STUDENT IN AN ORGANIZED HEALTH CARE EDUCATION/TRAINING PROGRAM

## 2022-04-19 RX ORDER — GABAPENTIN 300 MG/1
300 CAPSULE ORAL EVERY 12 HOURS SCHEDULED
Qty: 60 CAPSULE | Refills: 0
Start: 2022-04-19 | End: 2022-04-26

## 2022-04-19 RX ORDER — LANOLIN ALCOHOL/MO/W.PET/CERES
3 CREAM (GRAM) TOPICAL ONCE
Status: COMPLETED | OUTPATIENT
Start: 2022-04-19 | End: 2022-04-19

## 2022-04-19 RX ADMIN — RANOLAZINE 500 MG: 500 TABLET, FILM COATED, EXTENDED RELEASE ORAL at 09:58

## 2022-04-19 RX ADMIN — ISOSORBIDE MONONITRATE 30 MG: 30 TABLET, EXTENDED RELEASE ORAL at 05:56

## 2022-04-19 RX ADMIN — INSULIN ASPART 8 UNITS: 100 INJECTION, SOLUTION INTRAVENOUS; SUBCUTANEOUS at 01:05

## 2022-04-19 RX ADMIN — ATORVASTATIN CALCIUM 20 MG: 20 TABLET, FILM COATED ORAL at 09:59

## 2022-04-19 RX ADMIN — DULOXETINE HYDROCHLORIDE 20 MG: 20 CAPSULE, DELAYED RELEASE ORAL at 09:58

## 2022-04-19 RX ADMIN — IPRATROPIUM BROMIDE AND ALBUTEROL SULFATE 3 ML: .5; 3 SOLUTION RESPIRATORY (INHALATION) at 10:26

## 2022-04-19 RX ADMIN — HEPARIN SODIUM 5000 UNITS: 5000 INJECTION, SOLUTION INTRAVENOUS; SUBCUTANEOUS at 05:56

## 2022-04-19 RX ADMIN — SEVELAMER CARBONATE 800 MG: 800 TABLET, FILM COATED ORAL at 11:40

## 2022-04-19 RX ADMIN — MELATONIN 3 MG: 3 TAB ORAL at 01:03

## 2022-04-19 RX ADMIN — CLOPIDOGREL BISULFATE 75 MG: 75 TABLET ORAL at 09:58

## 2022-04-19 RX ADMIN — METOPROLOL TARTRATE 25 MG: 25 TABLET, FILM COATED ORAL at 09:59

## 2022-04-19 RX ADMIN — Medication 10 ML: at 10:01

## 2022-04-19 RX ADMIN — GABAPENTIN 300 MG: 300 CAPSULE ORAL at 09:59

## 2022-04-19 RX ADMIN — HYDROCODONE BITARTRATE AND ACETAMINOPHEN 1 TABLET: 5; 325 TABLET ORAL at 01:03

## 2022-04-19 RX ADMIN — ACETAMINOPHEN 650 MG: 325 TABLET ORAL at 10:03

## 2022-04-19 RX ADMIN — OXYBUTYNIN CHLORIDE 5 MG: 5 TABLET, EXTENDED RELEASE ORAL at 09:59

## 2022-04-19 RX ADMIN — LEVOTHYROXINE SODIUM 25 MCG: 25 TABLET ORAL at 05:56

## 2022-04-19 RX ADMIN — IPRATROPIUM BROMIDE AND ALBUTEROL SULFATE 3 ML: .5; 3 SOLUTION RESPIRATORY (INHALATION) at 06:55

## 2022-04-19 RX ADMIN — NYSTATIN: 100000 POWDER TOPICAL at 10:00

## 2022-04-19 RX ADMIN — LISINOPRIL 5 MG: 20 TABLET ORAL at 10:08

## 2022-04-19 RX ADMIN — ASPIRIN 81 MG: 81 TABLET, FILM COATED ORAL at 09:58

## 2022-04-19 RX ADMIN — ORAL SEMAGLUTIDE 3 MG: 3 TABLET ORAL at 10:01

## 2022-04-19 RX ADMIN — DOCUSATE SODIUM 50 MG AND SENNOSIDES 8.6 MG 2 TABLET: 8.6; 5 TABLET, FILM COATED ORAL at 09:58

## 2022-04-19 RX ADMIN — SEVELAMER CARBONATE 800 MG: 800 TABLET, FILM COATED ORAL at 09:58

## 2022-04-19 RX ADMIN — INSULIN ASPART 4 UNITS: 100 INJECTION, SOLUTION INTRAVENOUS; SUBCUTANEOUS at 05:56

## 2022-04-19 RX ADMIN — INSULIN ASPART 12 UNITS: 100 INJECTION, SOLUTION INTRAVENOUS; SUBCUTANEOUS at 11:41

## 2022-04-19 NOTE — PROGRESS NOTES
FAMILY MEDICINE DAILY PROGRESS NOTE    NAME: Yamileth Slater  : 1960  MRN: [id number]      LOS: 0 days     PROVIDER OF SERVICE: Rianna Macias MD    Chief Complaint: Admission for dialysis ([facility])    Subjective:   Patient Seen At: 0650    Interval History:  History taken from: patient chart  On the morning of 22 patient reports feeling ok. She states that she did not slept well because she was turning all night long. No acute concerns this morning.       Review of Systems:   Review of Systems   Constitutional: Negative for fatigue and unexpected weight change.   Respiratory: Negative for chest tightness and shortness of breath.    Cardiovascular: Negative for chest pain and palpitations.   Gastrointestinal: Negative for abdominal pain, constipation, diarrhea and vomiting.   Endocrine: Negative for cold intolerance, heat intolerance, polydipsia and polyuria.   Musculoskeletal: Negative for back pain, myalgias and neck pain.   Skin: Negative for rash.   Neurological: Negative for dizziness, weakness, light-headedness, numbness and headaches.   Psychiatric/Behavioral: Negative for agitation.       Objective:     Vital Signs  Temp:  [96 °F (35.6 °C)-98.7 °F (37.1 °C)] 98.6 °F (37 °C)  Heart Rate:  [] 64  Resp:  [16-20] 16  BP: (120-164)/(57-96) 147/78  Body mass index is 54.54 kg/m².    Physical Exam  Physical Exam  Vitals reviewed.   Constitutional:       General: She is not in acute distress.     Appearance: She is obese. She is not ill-appearing or toxic-appearing.   HENT:      Head: Normocephalic and atraumatic.   Eyes:      Conjunctiva/sclera: Conjunctivae normal.   Cardiovascular:      Rate and Rhythm: Normal rate and regular rhythm.   Pulmonary:      Effort: Pulmonary effort is normal.      Breath sounds: Normal breath sounds. No wheezing.      Comments: On 3 L NC  Abdominal:      General: Bowel sounds are normal.      Palpations: Abdomen is soft.      Tenderness: There is no abdominal  tenderness.   Musculoskeletal:      Right lower leg: No edema.      Left lower leg: No edema.   Neurological:      Mental Status: She is alert and oriented to person, place, and time. Mental status is at baseline.         Medication Review    Current Facility-Administered Medications:   •  acetaminophen (TYLENOL) tablet 650 mg, 650 mg, Oral, Q4H PRN **OR** acetaminophen (TYLENOL) 160 MG/5ML solution 650 mg, 650 mg, Oral, Q4H PRN **OR** acetaminophen (TYLENOL) suppository 650 mg, 650 mg, Rectal, Q4H PRN, Rianna Macias MD  •  albumin human 25 % IV SOLN 12.5 g, 12.5 g, Intravenous, PRN, Tee Jacobo MD  •  albuterol (PROVENTIL) nebulizer solution 0.083% 2.5 mg/3mL, 2.5 mg, Nebulization, Q4H PRN, Rianna Macias MD  •  aspirin EC tablet 81 mg, 81 mg, Oral, Daily, Rianna Macias MD, 81 mg at 04/18/22 1114  •  atorvastatin (LIPITOR) tablet 20 mg, 20 mg, Oral, Daily, Rianna Macias MD, 20 mg at 04/18/22 1114  •  sennosides-docusate (PERICOLACE) 8.6-50 MG per tablet 2 tablet, 2 tablet, Oral, BID, 2 tablet at 04/18/22 2001 **AND** polyethylene glycol (MIRALAX) packet 17 g, 17 g, Oral, Daily PRN **AND** bisacodyl (DULCOLAX) EC tablet 5 mg, 5 mg, Oral, Daily PRN **AND** bisacodyl (DULCOLAX) suppository 10 mg, 10 mg, Rectal, Daily PRN, Rianna Macias MD  •  clopidogrel (PLAVIX) tablet 75 mg, 75 mg, Oral, Daily, Rianna Macias MD, 75 mg at 04/18/22 1114  •  dextrose (D50W) (25 g/50 mL) IV injection 25 g, 25 g, Intravenous, Q15 Min PRN, Eduin Griffin MD  •  dextrose (GLUTOSE) oral gel 15 g, 15 g, Oral, Q15 Min PRN, Eduin Griffin MD  •  DULoxetine (CYMBALTA) DR capsule 20 mg, 20 mg, Oral, Daily, Rianna Macias MD, 20 mg at 04/18/22 1114  •  epoetin hubert-epbx (RETACRIT) injection 10,000 Units, 10,000 Units, Intravenous, Once per day on Mon Wed Fri, Ace Lauren MD, 10,000 Units at 04/18/22 1331  •  gabapentin (NEURONTIN) capsule 300 mg, 300 mg, Oral, Q12H, Froylan Lu MD, 300 mg at 04/18/22 1114  •  glucagon  (human recombinant) (GLUCAGEN DIAGNOSTIC) injection 1 mg, 1 mg, Intramuscular, Q15 Min PRN, Eduin Griffin MD  •  heparin (porcine) 5000 UNIT/ML injection 5,000 Units, 5,000 Units, Subcutaneous, Q8H, Rianna Macias MD, 5,000 Units at 04/19/22 0556  •  heparin (porcine) injection 4,000 Units, 4,000 Units, Intracatheter, PRN, Ace Lauren MD, 4,000 Units at 04/18/22 1331  •  HYDROcodone-acetaminophen (NORCO) 5-325 MG per tablet 1 tablet, 1 tablet, Oral, Q4H PRN, Rianna Macias MD, 1 tablet at 04/19/22 0103  •  insulin aspart (novoLOG) injection 0-24 Units, 0-24 Units, Subcutaneous, Q4H, Eduin Griffin MD, 4 Units at 04/19/22 0556  •  insulin detemir (LEVEMIR) injection 25 Units, 25 Units, Subcutaneous, Nightly, Eduin Griffin MD, 25 Units at 04/18/22 2014  •  ipratropium-albuterol (DUO-NEB) nebulizer solution 3 mL, 3 mL, Nebulization, 4x Daily - RT, Rianna Macias MD, 3 mL at 04/19/22 0655  •  isosorbide mononitrate (IMDUR) 24 hr tablet 30 mg, 30 mg, Oral, QAM, Rianna Macias MD, 30 mg at 04/19/22 0556  •  levothyroxine (SYNTHROID, LEVOTHROID) tablet 25 mcg, 25 mcg, Oral, Q AM, Rianna Macias MD, 25 mcg at 04/19/22 0556  •  lisinopril (PRINIVIL,ZESTRIL) tablet 5 mg, 5 mg, Oral, Daily, Rianna Macias MD, 5 mg at 04/17/22 0836  •  metoprolol tartrate (LOPRESSOR) tablet 25 mg, 25 mg, Oral, Q12H, Rianna Macias MD, 25 mg at 04/18/22 2002  •  montelukast (SINGULAIR) tablet 10 mg, 10 mg, Oral, Nightly, Rianna Macias MD, 10 mg at 04/18/22 2002  •  nystatin (MYCOSTATIN) powder, , Topical, BID, Nestor Richards MD, 1 application at 04/18/22 2003  •  ondansetron (ZOFRAN) tablet 4 mg, 4 mg, Oral, Q6H PRN, Rianna Macias MD  •  oxybutynin XL (DITROPAN-XL) 24 hr tablet 5 mg, 5 mg, Oral, Daily, Rianna Macias MD, 5 mg at 04/18/22 1114  •  pantoprazole (PROTONIX) EC tablet 40 mg, 40 mg, Oral, Nightly, Rianna Macias MD, 40 mg at 04/18/22 2002  •  Pharmacy Consult, , Does not apply, Continuous PRN, Eduin Griffin MD  •   ranolazine (RANEXA) 12 hr tablet 500 mg, 500 mg, Oral, Q12H, Rianna Macias MD, 500 mg at 04/18/22 2002  •  Semaglutide tablet 3 mg, 3 mg, Oral, Daily, LeEduin MD, 3 mg at 04/18/22 1148  •  sevelamer (RENVELA) tablet 800 mg, 800 mg, Oral, TID With Meals, Rianna Macias MD, 800 mg at 04/18/22 1735  •  sodium chloride 0.9 % flush 10 mL, 10 mL, Intravenous, PRN, Rianna Macias MD  •  sodium chloride 0.9 % flush 10 mL, 10 mL, Intravenous, Q12H, Rianna Macias MD, 10 mL at 04/18/22 2003  •  sodium chloride 0.9 % flush 10 mL, 10 mL, Intravenous, PRN, Rianna Macias MD     Diagnostic Data    Lab Results (last 24 hours)     Procedure Component Value Units Date/Time    POC Glucose Once [[id number]]  (Normal) Collected: 04/19/22 0725    Specimen: Blood Updated: 04/19/22 0748     Glucose 125 mg/dL      Comment: RN NotifiedOperator: [id number] HDALEY TAYLORMeter ID: [id number]       Comprehensive Metabolic Panel [[id number]]  (Abnormal) Collected: 04/19/22 0641    Specimen: Blood Updated: 04/19/22 0724     Glucose 147 mg/dL      BUN 39 mg/dL      Creatinine 2.83 mg/dL      Sodium 135 mmol/L      Potassium 3.6 mmol/L      Chloride 98 mmol/L      CO2 26.0 mmol/L      Calcium 8.6 mg/dL      Total Protein 6.4 g/dL      Albumin 3.10 g/dL      ALT (SGPT) 8 U/L      AST (SGOT) 10 U/L      Alkaline Phosphatase 170 U/L      Total Bilirubin 0.4 mg/dL      Globulin 3.3 gm/dL      A/G Ratio 0.9 g/dL      BUN/Creatinine Ratio 13.8     Anion Gap 11.0 mmol/L      eGFR 18.2 mL/min/1.73      Comment: National Kidney Foundation and American Society of Nephrology (ASN) Task Force recommended calculation based on the Chronic Kidney Disease Epidemiology Collaboration (CKD-EPI) equation refit without adjustment for race.       Narrative:      GFR Normal >60  Chronic Kidney Disease <60  Kidney Failure <15      CBC & Differential [[id number]]  (Abnormal) Collected: 04/19/22 0641    Specimen: Blood Updated: 04/19/22 0654    Narrative:       The following orders were created for panel order CBC & Differential.  Procedure                               Abnormality         Status                     ---------                               -----------         ------                     CBC Auto Differential[[id number]]        Abnormal            Final result                 Please view results for these tests on the individual orders.    CBC Auto Differential [[id number]]  (Abnormal) Collected: 04/19/22 0641    Specimen: Blood Updated: 04/19/22 0654     WBC 7.35 10*3/mm3      RBC 3.28 10*6/mm3      Hemoglobin 8.7 g/dL      Hematocrit 28.6 %      MCV 87.2 fL      MCH 26.5 pg      MCHC 30.4 g/dL      RDW 16.2 %      RDW-SD 51.1 fl      MPV 8.5 fL      Platelets 290 10*3/mm3      Neutrophil % 62.7 %      Lymphocyte % 23.5 %      Monocyte % 9.0 %      Eosinophil % 2.6 %      Basophil % 1.0 %      Immature Grans % 1.2 %      Neutrophils, Absolute 4.61 10*3/mm3      Lymphocytes, Absolute 1.73 10*3/mm3      Monocytes, Absolute 0.66 10*3/mm3      Eosinophils, Absolute 0.19 10*3/mm3      Basophils, Absolute 0.07 10*3/mm3      Immature Grans, Absolute 0.09 10*3/mm3      nRBC 0.3 /100 WBC     POC Glucose Once [[id number]]  (Abnormal) Collected: 04/19/22 0508    Specimen: Blood Updated: 04/19/22 0524     Glucose 159 mg/dL      Comment: RN NotifiedOperator: [id number] KIMBELRYN OPALMeter ID: [id number]       POC Glucose Once [[id number]]  (Abnormal) Collected: 04/19/22 0254    Specimen: Blood Updated: 04/19/22 0310     Glucose 191 mg/dL      Comment: RN NotifiedOperator: [id number] ARLENE TKIEMeter ID: [id number]       POC Glucose Once [[id number]]  (Abnormal) Collected: 04/19/22 0036    Specimen: Blood Updated: 04/19/22 0049     Glucose 233 mg/dL      Comment: RN NotifiedOperator: [id number] VINCENT RYELLEMeter ID: [id number]       POC Glucose Once [[id number]]  (Abnormal) Collected: 04/18/22 2257    Specimen: Blood Updated: 04/18/22 2308     Glucose 245 mg/dL       Comment: RN NotifiedOperator: [id number] ARLENE Rodney ID: [id number]       POC Glucose Once [[id number]]  (Abnormal) Collected: 04/18/22 1912    Specimen: Blood Updated: 04/18/22 1934     Glucose 204 mg/dL      Comment: RN NotifiedOperator: [id number] BRANNON Collins ID: [id number]       POC Glucose Once [[id number]]  (Abnormal) Collected: 04/18/22 1702    Specimen: Blood Updated: 04/18/22 1715     Glucose 150 mg/dL      Comment: RN NotifiedOperator: [id number] SHA TINOCOLAMeter ID: [id number]       Blood Culture - Blood, Arm, Left [[id number]]  (Normal) Collected: 04/15/22 1613    Specimen: Blood from Arm, Left Updated: 04/18/22 1633     Blood Culture No growth at 3 days    Blood Culture - Blood, Arm, Left [[id number]]  (Normal) Collected: 04/15/22 1538    Specimen: Blood from Arm, Left Updated: 04/18/22 1547     Blood Culture No growth at 3 days    POC Glucose Once [[id number]]  (Abnormal) Collected: 04/18/22 1323    Specimen: Blood Updated: 04/18/22 1334     Glucose 208 mg/dL      Comment: RN NotifiedOperator: [id number] SHA TINOCOLAMeter ID: [id number]       POC Glucose Once [[id number]]  (Abnormal) Collected: 04/18/22 1033    Specimen: Blood Updated: 04/18/22 1054     Glucose 202 mg/dL      Comment: RN NotifiedOperator: [id number] SHA TINOCOLAMeter ID: [id number]       Urine Culture - Urine, Urine, Clean Catch [[id number]] Collected: 04/17/22 0907    Specimen: Urine, Clean Catch Updated: 04/18/22 0901     Urine Culture <10,000 CFU/mL Normal Urogenital Sushma            I reviewed the patient's new clinical results.    Assessment/Plan:     Active Hospital Problems    Diagnosis  POA   • **Admission for dialysis ([facility]) [Z99.2]  Not Applicable   • ESRD on hemodialysis ([facility]) [N18.6, Z99.2]  Not Applicable   • Type 2 diabetes mellitus with autonomic neuropathy (HCC) [E11.43]  Unknown   • Anemia due to chronic kidney disease, on chronic dialysis (HCC) [N18.6, D63.1, Z99.2]  Not  Applicable   • Hypothyroidism [E03.9]  Yes   • COPD (chronic obstructive pulmonary disease) (HCC) [J44.9]  Yes   • Mixed hyperlipidemia [E78.2]  Yes   • GERD (gastroesophageal reflux disease) [K21.9]  Yes   • Hypertension [I10]  Yes     #Admission for Dialysis  - Patient receives dialysis on M/W/F  - Non- compliant  - Missed dialysis on 4/15/2022  - Nephrology consulted for HD  - Patient given 80 mg of lasix on admission. Lasix d/c 4/19/22. Patient has no more edema.   - Received dialysis 04/16/2022 and 4/18/2022     #ESRD on HD  - Non compliant  - Creatinine on admission is 3.34 > 2.83  - Baseline Cr around 3  - Continue Epogen  - Bumex hold during admission     #Type 2 DM with autonomic neuropathy  - Levemir 25 units at night, Humalog 12 units with meals  - SSI  - Rebylsus 3 mg daily added today  - Glucose check QID  - Continue gabapentin . Discussed the the plan of decreasing dosage   - Duloxetine 20 mg continued     #HTN  - Continue home lisinopril 5 mg, metoprolol 25 mg BID     #Acute anemia on CKD  - Current HB 8.1 Asymptomatic  - Monitor H/H and symptoms  - HB 7.2 > 8.7     #Hypothyrodism  - Patient reports not taking levothyroxine, restarted on admission     #COPD  - Dependent on 3 L NC  - Maintain strict sats between 88-92%  - Continue home meds of duoned Q6H, montelukast 10 mg daily     #Mixed hyperlipidemia   - Continue home atorvastatin 20 mg     #GERD  -Continue Protonix 40 mg     #CAD    - Continue plavix 75 mg, aspirin 81 mg, imdur 30 mg daily and Ranexa 500 mg BID     #Encelopathy  - Limit psychotropic meds    DVT prophylaxis:   Mechanical Order History:     None      Pharmalogical Order History:      Ordered     Dose Route Frequency Stop    04/16/22 0651  heparin (porcine) injection 4,000 Units         4,000 Units IK As Needed --    04/15/22 1706  heparin (porcine) 5000 UNIT/ML injection 5,000 Units         5,000 Units SC Every 8 Hours Scheduled --               Code status is   Code Status and  Medical Interventions:   Ordered at: 04/15/22 1700     Level Of Support Discussed With:    Patient     Code Status (Patient has no pulse and is not breathing):    CPR (Attempt to Resuscitate)     Medical Interventions (Patient has pulse or is breathing):    Full Support       Plan for disposition:Where: home and When:  today       Time: 15 minutes            This document has been electronically signed by Rianna Macias MD on April 19, 2022 08:19 CDT       Part of this note may be an electronic transcription/translation of spoken language to printed text using the Dragon Dictation System.

## 2022-04-19 NOTE — PLAN OF CARE
Goal Outcome Evaluation:      VSS. Pt alert and oriented this shift. Medicated for pain this am x1. Md evaluated pt and cleared for d/c.

## 2022-04-19 NOTE — NURSING NOTE
Arti Long is 12 year old 0 month old, here for a preventive care visit.    Assessment & Plan        Arti was seen today for well child.    Diagnoses and all orders for this visit:    Encounter for routine child health examination w/o abnormal findings  -     BEHAVIORAL/EMOTIONAL ASSESSMENT (39865)  -     HPV, IM (9-26 YRS) - Gardasil 9  -     INFLUENZA VACCINE IM > 6 MONTHS VALENT IIV4 (AFLURIA/FLUZONE)  -     Hemoglobin  -     Lipid Profile  FASTING    Heartburn - causing substernal burning; differential also includes costochondritis. Exam reassuring. Will try a week or two of omeprazole, but asked her to follow up if not helping or worsening.  -     omeprazole (PRILOSEC) 20 MG DR capsule; Take 1 capsule (20 mg) by mouth daily    Other orders  -     COVID-19,Evogen,PFIZER (12+ YRS)      Growth        Normal height and weight    No weight concerns.    Immunizations   Immunizations Administered     Name Date Dose VIS Date Route    COVID-19,PF,Pfizer (12+ Yrs) 1/20/22  1:50 PM 0.3 mL EUA,01/03/2022,Given today Intramuscular    HPV9 1/20/22  1:48 PM 0.5 mL 08/06/2021, Given Today Intramuscular    INFLUENZA VACCINE IM > 6 MONTHS VALENT IIV4 1/20/22  1:48 PM 0.5 mL 08/06/2021, Given Today Intramuscular        Appropriate vaccinations were ordered.      Anticipatory Guidance    Reviewed age appropriate anticipatory guidance.   The following topics were discussed:  SOCIAL/ FAMILY:    Social media    TV/ media    School/ homework  NUTRITION:    Healthy food choices  HEALTH/ SAFETY:    Adequate sleep/ exercise  SEXUALITY:    Menstruation    Cleared for sports:  Not addressed      Referrals/Ongoing Specialty Care  Verbal referral for routine dental care    Follow Up      Return in 1 year (on 1/20/2023) for Preventive Care visit.    Subjective     Additional Questions 1/20/2022   Do you have any questions today that you would like to discuss? Yes   Questions sternum hurts when she wakes up   Has your child had a surgery,  Dr Méndez came to the room notifed him of how much insulin being given and that pt thinks date is march 10 2022     major illness or injury since the last physical exam? No     Pain, like a burning, along epigastric area and lower sternum noted often when she wakes and sometimes just when she's standing - it happens several times daily and gradually resolves. No nausea, vomiting or diarrhea.  No shortness of breath or cough. No association with exercise. No dysphagia or appetite changes. No changes in diet or association with food. No new exercises or weight lifting. No treatments tried.     Social 1/20/2022   Who does your adolescent live with? Parent(s)   Has your adolescent experienced any stressful family events recently? None   In the past 12 months, has lack of transportation kept you from medical appointments or from getting medications? Decline   In the last 12 months, was there a time when you were not able to pay the mortgage or rent on time? No   In the last 12 months, was there a time when you did not have a steady place to sleep or slept in a shelter (including now)? No    (!) TRANSPORTATION CONCERN PRESENT    Health Risks/Safety 1/20/2022   Does your adolescent always wear a seat belt? Yes   Does your adolescent wear a helmet for bicycle, rollerblades, skateboard, scooter, skiing/snowboarding, ATV/snowmobile? Yes          TB Screening 1/20/2022   Since your last Well Child visit, has your adolescent or any of their family members or close contacts had tuberculosis or a positive tuberculosis test? No   Since your last Well Child Visit, has your adolescent or any of their family members or close contacts traveled or lived outside of the [place]? No   Since your last Well Child visit, has your adolescent lived in a high-risk group setting like a correctional facility, health care facility, homeless shelter, or refugee camp?  No        Dyslipidemia Screening 1/20/2022   Have any of the child's parents or grandparents had a stroke or heart attack before age 55 for males or before age 65 for females?  No   Do either  of the child's parents have high cholesterol or are currently taking medications to treat cholesterol? No    Risk Factors: None      Dental Screening 1/20/2022   Has your adolescent seen a dentist? (!) NO   Has your adolescent had cavities in the last 3 years? No   Has your adolescent s parent(s), caregiver, or sibling(s) had any cavities in the last 2 years?  No     Dental Fluoride Varnish:   No, getting in with dentist soon.  Diet 1/20/2022   Do you have questions about your adolescent's eating?  No   Do you have questions about your adolescent's height or weight? No   What does your adolescent regularly drink? Water   How often does your family eat meals together? Every day   How many servings of fruits and vegetables does your adolescent eat a day? 5 or more   Does your adolescent get at least 3 servings of food or beverages that have calcium each day (dairy, green leafy vegetables, etc.)? Yes   Within the past 12 months, you worried that your food would run out before you got money to buy more. Never true   Within the past 12 months, the food you bought just didn't last and you didn't have money to get more. Never true       Activity 1/20/2022   On average, how many days per week does your adolescent engage in moderate to strenuous exercise (like walking fast, running, jogging, dancing, swimming, biking, or other activities that cause a light or heavy sweat)? (!) 3 DAYS   On average, how many minutes does your adolescent engage in exercise at this level? 60 minutes   What does your adolescent do for exercise?  Run   What activities is your adolescent involved with?  Volleyball     Media Use 1/20/2022   How many hours per day is your adolescent viewing a screen for entertainment?  2 hours   Does your adolescent use a screen in their bedroom?  No     Sleep 1/20/2022   Does your adolescent have any trouble with sleep? No   Does your adolescent have daytime sleepiness or take naps? No     Vision/Hearing 1/20/2022  "  Do you have any concerns about your adolescent's hearing or vision? No concerns         School 1/20/2022   Do you have any concerns about your adolescent's learning in school? (!) READING, (!) MATH, (!) WRITING   What grade is your adolescent in school? 7th Grade   What school does your adolescent attend? [employer]   Does your adolescent typically miss more than 2 days of school per month? No     Development / Social-Emotional Screen 1/20/2022   Does your child receive any special educational services? No     Psycho-Social/Depression - PSC-17 required for C&TC through age 18  General screening:  Electronic PSC   PSC SCORES 1/20/2022   Inattentive / Hyperactive Symptoms Subtotal 1   Externalizing Symptoms Subtotal 2   Internalizing Symptoms Subtotal 1   PSC - 17 Total Score 4       Follow up:  no follow up necessary   Teen Screen  Teen Screen completed, reviewed and scanned document within chart    AMB [facility] MENSES SECTION 1/20/2022   What are your adolescent's periods like?  Regular       Constitutional, eye, ENT, skin, respiratory, cardiac, GI, MSK, neuro, and allergy are normal except as otherwise noted.       Objective     Exam  BP 94/62 (BP Location: Left arm, Patient Position: Sitting, Cuff Size: Adult Small)   Pulse 80   Temp 98  F (36.7  C) (Oral)   Ht 5' 1\" (1.549 m)   Wt 113 lb 12.8 oz (51.6 kg)   BMI 21.50 kg/m    36 %ile (Z= -0.35) based on CDC (Girls, 2-20 Years) Stature-for-age data based on Stature recorded on 1/20/2022.  71 %ile (Z= 0.55) based on CDC (Girls, 2-20 Years) weight-for-age data using vitals from 1/20/2022.  79 %ile (Z= 0.80) based on CDC (Girls, 2-20 Years) BMI-for-age based on BMI available as of 1/20/2022.  Blood pressure percentiles are 12 % systolic and 49 % diastolic based on the 2017 AAP Clinical Practice Guideline. This reading is in the normal blood pressure range.  Physical Exam  GENERAL: Active, alert, in no acute distress.  SKIN: Clear. No significant rash, " abnormal pigmentation or lesions  HEAD: Normocephalic  EYES: Pupils equal, round, reactive, Extraocular muscles intact. Normal conjunctivae.  EARS: Normal canals. Tympanic membranes are normal; gray and translucent.  NOSE: Normal without discharge.  MOUTH/THROAT: Clear. No oral lesions. Teeth without obvious abnormalities.  NECK: Supple, no masses.  No thyromegaly.  LYMPH NODES: No adenopathy  LUNGS: Clear. No rales, rhonchi, wheezing or retractions  HEART: Regular rhythm. Normal S1/S2. No murmurs. Normal pulses.  ABDOMEN: Soft, non-tender, not distended, no masses or hepatosplenomegaly. Bowel sounds normal.   NEUROLOGIC: No focal findings. Cranial nerves grossly intact: DTR's normal. Normal gait, strength and tone  BACK: Spine is straight, no scoliosis.  EXTREMITIES: Full range of motion, no deformities  : Normal female external genitalia, Erik stage 4.   BREASTS:  Erik stage 4.  No abnormalities.     No Marfan stigmata: kyphoscoliosis, high-arched palate, pectus excavatuM, arachnodactyly, arm span > height, hyperlaxity, myopia, MVP, aortic insufficieny)  Eyes: normal fundoscopic and pupils  Cardiovascular: normal PMI, simultaneous femoral/radial pulses, no murmurs (standing, supine, Valsalva)  Skin: no HSV, MRSA, tinea corporis  Musculoskeletal    Neck: normal    Back: normal    Shoulder/arm: normal    Elbow/forearm: normal    Wrist/hand/fingers: normal    Hip/thigh: normal    Knee: normal    Leg/ankle: normal    Foot/toes: normal    Functional (Single Leg Hop or Squat): normal      Laurie Hernandez MD  [facility]

## 2022-04-19 NOTE — PROGRESS NOTES
"[facility]  [address] [place] [address]. [id number]   - [phone]  F - [phone]     Progress Note          PATIENT  DEMOGRAPHICS   PATIENT NAME: Yamileth Slater                      PHYSICIAN: Ace Lauren MD  : 1960  MRN: [id number]   LOS: 0 days    Patient Care Team:  Rianna Macias MD as PCP - General (Family Medicine)  Subjective   SUBJECTIVE   Breathing lot better. Plan for discharge today       Objective   OBJECTIVE   Vital Signs  Temp:  [96 °F (35.6 °C)-98.7 °F (37.1 °C)] 96.8 °F (36 °C)  Heart Rate:  [] 81  Resp:  [16-20] 18  BP: (120-158)/(57-96) 158/72    Flowsheet Rows      Flowsheet Row First Filed Value   Admission Height 157.5 cm (62\") Documented at 04/15/2022 1323   Admission Weight 136 kg (300 lb) Documented at 04/15/2022 1323             I/O last 3 completed shifts:  In: 360 [P.O.:360]  Out: 7400 [Urine:2400; Other:5000]    PHYSICAL EXAM    Physical Exam  Constitutional:       General: She is not in acute distress.     Appearance: She is obese. She is ill-appearing.   HENT:      Head: Normocephalic and atraumatic.   Cardiovascular:      Rate and Rhythm: Normal rate and regular rhythm.   Pulmonary:      Effort: Pulmonary effort is normal.      Breath sounds: Rales present.   Abdominal:      General: Bowel sounds are normal. There is distension.   Musculoskeletal:         General: Swelling present.      Right lower leg: Edema present.      Left lower leg: Edema present.   Skin:     General: Skin is warm and dry.      Coloration: Skin is pale.   Neurological:      Mental Status: She is alert. Mental status is at baseline.         RESULTS   Results Review:    Results from last 7 days   Lab Units 22  0641 22  0626 22  1803   SODIUM mmol/L 135* 133* 128*   POTASSIUM mmol/L 3.6 4.0 4.3   CHLORIDE mmol/L 98 97* 91*   CO2 mmol/L 26.0 22.0 24.0   BUN mg/dL 39* 62* 54*   CREATININE mg/dL 2.83* 3.28* 3.42*   CALCIUM mg/dL 8.6 8.3* 8.8 "   BILIRUBIN mg/dL 0.4 0.4 0.4   ALK PHOS U/L 170* 172* 216*   ALT (SGPT) U/L 8 7 6   AST (SGOT) U/L 10 9 9   GLUCOSE mg/dL 147* 99 276*       Estimated Creatinine Clearance: 27 mL/min (A) (by C-G formula based on SCr of 2.83 mg/dL (H)).    Results from last 7 days   Lab Units 04/17/22  1803   MAGNESIUM mg/dL 2.2             Results from last 7 days   Lab Units 04/19/22  0641 04/18/22  0626 04/17/22  0639 04/16/22  0604 04/15/22  1340   WBC 10*3/mm3 7.35 7.90 9.77 8.58 8.08   HEMOGLOBIN g/dL 8.7* 8.1* 7.4* 7.4* 7.2*   PLATELETS 10*3/mm3 290 314 286 267 287               Imaging Results (Last 24 Hours)       Procedure Component Value Units Date/Time    US Venous Doppler Lower Extremity Bilateral (duplex) [[id number]] Collected: 04/18/22 2006     Updated: 04/18/22 2055    Narrative:      EXAM: US LOWER EXTREMITY VEINS BILATERAL    HISTORY: Tachycardia with h/o A. fib, E87.70 Fluid overload,  unspecified J18.9 Pneumonia, unspecified organism    COMPARISON STUDY:      TECHNIQUE:  Grayscale, duplex and color Doppler sonogram of the bilateral  lower extremities was obtained.     FINDINGS:    There is complete coaptation with graded compression at all  visualized levels from the common femoral vein to the calf veins.  There is augmentation of the venous waveform with calf  compression and respiratory variation seen at appropriate levels.   No filling defect is seen.   No Baker's cyst seen.     Unremarkable bilateral greater saphenous veins.      Impression:      IMPRESSION  1.  No evidence of deep venous thrombosis of the bilateral lower  extremities.       Electronically signed by:  Richmond Bernal MD  4/18/2022 8:53 PM CDT  Workstation: [id number]             MEDICATIONS    aspirin, 81 mg, Oral, Daily  atorvastatin, 20 mg, Oral, Daily  clopidogrel, 75 mg, Oral, Daily  DULoxetine, 20 mg, Oral, Daily  epoetin hubert/hubert-epbx, 10,000 Units, Intravenous, Once per day on Mon Wed Fri  gabapentin, 300 mg, Oral, Q12H  heparin (porcine),  5,000 Units, Subcutaneous, Q8H  insulin aspart, 0-24 Units, Subcutaneous, Q4H  insulin detemir, 25 Units, Subcutaneous, Nightly  ipratropium-albuterol, 3 mL, Nebulization, 4x Daily - RT  isosorbide mononitrate, 30 mg, Oral, QAM  levothyroxine, 25 mcg, Oral, Q AM  lisinopril, 5 mg, Oral, Daily  metoprolol tartrate, 25 mg, Oral, Q12H  montelukast, 10 mg, Oral, Nightly  nystatin, , Topical, BID  oxybutynin XL, 5 mg, Oral, Daily  pantoprazole, 40 mg, Oral, Nightly  ranolazine, 500 mg, Oral, Q12H  Semaglutide, 3 mg, Oral, Daily  senna-docusate sodium, 2 tablet, Oral, BID  sevelamer, 800 mg, Oral, TID With Meals  sodium chloride, 10 mL, Intravenous, Q12H      Pharmacy Consult,         Assessment/Plan   ASSESSMENT / PLAN      Admission for dialysis ([facility])    Mixed hyperlipidemia    GERD (gastroesophageal reflux disease)    Hypertension    COPD (chronic obstructive pulmonary disease) ([facility])    Hypothyroidism    Anemia due to chronic kidney disease, on chronic dialysis ([facility])    Type 2 diabetes mellitus with autonomic neuropathy ([facility])    ESRD on hemodialysis ([facility])    1.  ESRD on HD- Initiated HD on 10/21 due to hyperkalemia and fluid overload, now ESRD.  HD MWF at [facility].  She signed off early from dialysis on Wednesday and missed Friday session entirely.  had HD on Saturday and then Monday.     She does make some urine on her own. Plan furosemide 80 mg on non-dialysis days. Hd today    2. Anemia / previous GI bleed / b12 deficiency-  s/p capsule endoscopy which showed few small non-bleeding intestinal AVMs and single small polyp.She rec'd transfusion last admission in March. Current Hgb >8. Dr Munoz has seen the patient in the past. On epogen with dialysis. Hgb is improving     3. Procalcitonin 0.27- blood culture iNGSF. 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      4.  Shortness of breath/chronic respiratory failure- on oxygen and night time  trilogy. Furosemide 80mg on non-dialysis days until edema is reduced.       5.  History of CAD- on Imdur, Ranexa. NTG prn. Sees Cardiology, Dr Pimentel. Current EKG reports right BBB with PVCs.     6.  HTN- Bps are acceptable- on lisinopril 5mg daily, metoprolol. There is still edema.     7. CKD/MBD- Calcium 8.5  On sevelamer 800mg tid with meals    Dispo discharge today              This document has been electronically signed by Ace Lauren MD on April 19, 2022 11:54 CDT

## 2022-04-19 NOTE — PLAN OF CARE
Goal Outcome Evaluation:  Plan of Care Reviewed With: patient           Outcome Evaluation: PT Evaluation in room; pt demonstrated desire to be up and willing to walk/get active. She requires SBA-supervision of 1 for bed mobility, transfers w/ FWRW and gait in room as she his able to manage her02 tubing and safey habits. Her goal is to return home with spouse- we will work with her to prepare her for that goal. She has good rehab potential and good tolerance for mobility in room.  Rec home w/ spouse and HHPT

## 2022-04-19 NOTE — OUTREACH NOTE
Prep Survey    Flowsheet Row Responses   Alevism facility patient discharged from? [place]   Is LACE score < 7 ? No   Emergency Room discharge w/ pulse ox? No   Eligibility [facility]   Date of Admission 04/15/22   Date of Discharge 04/19/22   Discharge Disposition Home or Self Care   Discharge diagnosis Admission for dialysis    Does the patient have one of the following disease processes/diagnoses(primary or secondary)? Other   Does the patient have Home health ordered? No   Is there a DME ordered? No   Prep survey completed? Yes          DORY Chaney Registered Nurse

## 2022-04-19 NOTE — SIGNIFICANT NOTE
"[facility] Medicine  Progress Note    Patient Name: Buddy Park  MRN: [id number]  Patient Class: IP- Inpatient   Admission Date: 2/1/2020  Length of Stay: 6 days  Attending Physician: Rudi Mayers MD  Primary Care Provider: Buddy Chatman MD        Subjective:     Principal Problem:Cerebrovascular accident (CVA) due to occlusion of small artery        HPI:  Buddy Park is a 66 y.o. male with PMHx of HTN, HLD,  who presented to the ED for evaluation of left sided weakness and left facial droop that was noticed this morning by his spouse. However on discussion with patient he mentions he himself had started noticing symptoms of left arm weakness since yesterday morning and had difficulty writing. His speech as per him is baseline as he has a speech deficit from prio . Patient reports onset of fatigue x1 week ago. The spouse endorses noticing the patient "dragging his left foot" this morning, prompting her to bring him to the ED.He is a nonsmoker.He does endorse non compliance to his medications in the last 2 months including his antihypertensive.Patient mentions left carotid neck surgery ~x1-1.5 years ago. Patient has no other medical concerns or complaints at this moment. SHx includes Ear mastoidectomy w/ cochlear implant w/ landmark and Carotid angioplasty.  Patient admitted for work up of possible stroke with neurology consult.     Overview/Hospital Course:  No notes on file    Interval History:  Stable awaiting placement    Review of Systems   Constitutional: Negative for appetite change and fever.   HENT: Negative for sinus pressure and sore throat.    Eyes: Negative for photophobia and redness.   Respiratory: Negative for cough and shortness of breath.    Cardiovascular: Negative for chest pain and leg swelling.   Gastrointestinal: Negative for abdominal distention and abdominal pain.   Genitourinary: Negative for difficulty urinating and dysuria.   Musculoskeletal: " Circa 2000 hrs. called to the patient's room in relation to episode of tachycardia and patient status in relation to glucose level.  Nursing staff note that the patient's heart rate was within range, patient would have evening cardiac medications within the next few minutes.  Doppler ultrasound ordered for bilateral lower limb.  ECG ordered.  Gabapentin held 300 mg,  patient currently asymptomatic in relation to symptoms for which patient is receiving gabapentin, plan to taper patient's gabapentin over the next 7 days.  Patient has done well with one dose of 300 mg gabapentin today.  On discussion with Ms. Slater she was responsive to questions in relation to orientation.  However Ms. Slater had an episode of incontinence while I was in the room.  On discussion with Ms. Slater she stated that she has urinary incontinence from time to time.  On exiting the room the patient was in no acute distress and was sitting comfortably in conversation with the nursing staff.  I advised the nursing staff should they have any concerns in relation to the patient's heart rate or change in status not to hesitate to contact me.  Very much appreciate nursing staff's help with this patient over this evening.    Signature  Froylan Lu MD  [facility]  [address] [place] [address]  Office: [phone]      This document has been electronically signed by Froylan Lu MD on April 18, 2022 20:30 CDT       Positive for arthralgias and gait problem.   Skin: Negative for color change and pallor.   Neurological: Positive for facial asymmetry and weakness.   Psychiatric/Behavioral: Negative for agitation and behavioral problems.     Objective:     Vital Signs (Most Recent):  Temp: 96.9 °F (36.1 °C) (02/09/20 1204)  Pulse: 72 (02/09/20 1204)  Resp: 18 (02/09/20 1204)  BP: 138/70 (02/09/20 1204)  SpO2: 95 % (02/09/20 1204) Vital Signs (24h Range):  Temp:  [96.1 °F (35.6 °C)-98 °F (36.7 °C)] 96.9 °F (36.1 °C)  Pulse:  [68-72] 72  Resp:  [16-18] 18  SpO2:  [94 %-98 %] 95 %  BP: (124-144)/(68-77) 138/70     Weight: 71.9 kg (158 lb 8.2 oz)  Body mass index is 26.38 kg/m².    Intake/Output Summary (Last 24 hours) at 2/9/2020 1349  Last data filed at 2/9/2020 0642  Gross per 24 hour   Intake 240 ml   Output 450 ml   Net -210 ml      Physical Exam   Constitutional: He is oriented to person, place, and time. He appears well-developed and well-nourished.   HENT:   Head: Normocephalic and atraumatic.   Eyes: Pupils are equal, round, and reactive to light. Conjunctivae are normal.   Neck: Normal range of motion. Neck supple.   Cardiovascular: Normal rate and regular rhythm.   Pulmonary/Chest: Effort normal and breath sounds normal.   Abdominal: Soft. He exhibits no distension.   Musculoskeletal: He exhibits no deformity.   Neurological: He is alert and oriented to person, place, and time. A cranial nerve deficit is present. He exhibits abnormal muscle tone.   Skin: Skin is warm and dry.   Psychiatric: He has a normal mood and affect. His behavior is normal.       Significant Labs: All pertinent labs within the past 24 hours have been reviewed.    Significant Imaging: I have reviewed all pertinent imaging results/findings within the past 24 hours.      Assessment/Plan:      * Cerebrovascular accident (CVA) due to occlusion of small artery  Continue statin and antiplt's.  Neurology following.  Echo with bubble done--- good EF.  No  thrombus.  No intracardiac shunts.  It appears to be occlusion of a small artery in right hemispheric white matter.  Will treat with antiplatelets.  Consulting case mgmt for rehab referral.  For some unknown reason, the rehab in [place], [place] does not want to submit a request for authorization to patient's insurance [employer].  We will need to send referrals to either SNF or another inpatient rehab.      BMI 26.0-26.9,adult  Counseled regarding weight loss    Carotid disease, bilateral  S/p left CEA  CTA shows 40% NITZA.  Continue antiplatelets.  Follow-up Neurology    Deafness  Has cochlear implants        HTN (hypertension)  Chronic, controlled.  Latest blood pressure and vitals reviewed-   Temp:  [96.4 °F (35.8 °C)-98 °F (36.7 °C)]   Pulse:  [68-94]   Resp:  [14-18]   BP: (107-141)/(60-76)   SpO2:  [93 %-100 %] .   Home meds for hypertension were reviewed and noted below. Hospital anti-hypertensive changes were made as shown below.  Outpt Hypertension Medications             amLODIPine (NORVASC) 10 MG tablet Take 1 tablet (10 mg total) by mouth once daily.    carvedilol (COREG) 6.25 MG tablet Take 1 tablet (6.25 mg total) by mouth 2 (two) times daily with meals.    nitroGLYCERIN (NITROSTAT) 0.4 MG SL tablet Place 1 tablet (0.4 mg total) under the tongue every 5 (five) minutes as needed for Chest pain.      Hospital Medications             amLODIPine tablet 10 mg 10 mg, Oral, Daily    carvediloL tablet 6.25 mg 6.25 mg, Oral, 2 times daily        Will utilize p.r.n. blood pressure medication only if patient's blood pressure greater than  180/110 and he develops symptoms such as worsening chest pain or shortness of breath.      Hyperlipidemia  Continue on statin.          VTE Risk Mitigation (From admission, onward)         Ordered     IP VTE LOW RISK PATIENT  Once      02/04/20 1120     Place sequential compression device  Until discontinued      02/04/20 1120                      Rudi Mayers MD  Department of  Hospital Medicine [facility]

## 2022-04-19 NOTE — PLAN OF CARE
Goal Outcome Evaluation:  Plan of Care Reviewed With: patient        Progress: improving  Outcome Evaluation: VSS, pt pain medicated, she has not been able to tell me what month it is throughout the shift, she knows who she is and the president, she has been frequently trying to get up and has learned how to turn off the bed alarm, and demanding to keep her purse throughout the shift.

## 2022-04-19 NOTE — THERAPY DISCHARGE NOTE
Patient Name: Yamileth Slater  : 1960    MRN: [id number]                              Today's Date: 2022     PT Evaluation  Admit Date: 4/15/2022    Visit Dx:     ICD-10-CM ICD-9-CM   1. Hypervolemia, unspecified hypervolemia type  E87.70 276.69   2. Pneumonia of right lung due to infectious organism, unspecified part of lung  J18.9 483.8   3. Type 2 diabetes mellitus with diabetic autonomic neuropathy, with long-term current use of insulin ([facility])  E11.43 250.60    Z79.4 337.1     V58.67   4. Impaired functional mobility and endurance  Z74.09 V49.89     Patient Active Problem List   Diagnosis   • Chronic midline low back pain without sciatica   • Vitamin D deficiency   • Tobacco dependence syndrome   • Shoulder joint pain   • Peripheral vascular disease ([facility])   • Morbid obesity with BMI of 50.0-59.9, adult ([facility])   • Mixed hyperlipidemia   • Kidney stone   • History of colon polyps   • GERD (gastroesophageal reflux disease)   • Excoriated eczema   • Hypertension   • COPD (chronic obstructive pulmonary disease) ([facility])   • Chronic folliculitis   • Coronary artery disease involving native coronary artery of native heart without angina pectoris   • 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   • Hypothyroidism   • Carotid artery disease ([facility])   • [place] abuse   • PAD (peripheral artery disease) ([facility])   • Venous insufficiency   • LAFB (left anterior fascicular block)   • Pulmonary hypertension ([facility])   • Left hip pain   • Neck pain   • MIKE and COPD overlap syndrome ([facility])   • Pneumonia due to COVID-19 virus   • Hyperkalemia   • Cutaneous candidiasis   • Community acquired pneumonia of right middle lobe of lung   • MRSA infection   • Esophageal dysphagia   • Monilial esophagitis ([facility])   • NSTEMI, initial episode of care ([facility])   • Cellulitis of left leg   • Urinary tract infection due to Proteus   • History of insertion of tunneled central venous catheter (CVC) with port   • Anemia due to  chronic kidney disease, on chronic dialysis ([facility])   • Pneumonitis   • Personal history of noncompliance with medical treatment, presenting hazards to health   • Type 2 diabetes mellitus with autonomic neuropathy ([facility])   • Physical deconditioning   • ESRD on hemodialysis ([facility])   • Admission for dialysis ([facility])     Past Medical History:   Diagnosis Date   • Acute blood loss anemia 4/16/2017    Likely due to gastric oozing at this time. - Dr. Duarte (GI) was consulted and has now signed off, will follow up outpatient - pill colonoscopy showed AVMs - continue to monitor   • Acute cystitis with hematuria 3/31/2021    1/13: IV Rocephin 1 gm q 24 1/14 : transitioned  to omnicef 300mg. Urine cultures resulted and did not show growth. Omnicef discontinued as patient is asymptomatic   • Altered mental status 1/9/2022    - AMS on presentation - initial ABG pH 7.3, CO2 34 - Procal 0.29 - UA negative for acute cysitits -CTA head wnl  - Empiric Zosyn and Vancomycin -Lactate 2.5 on admission  - blood cultures no growth at 24 hours     • Anxiety    • CAD (coronary artery disease) 4/24/2021    S/P 3 stents 5/1/2021 for [facility] Continue ASA 81mg & Clopidogrel 75mg Continue Atorvastatin 40mg   • Carotid artery stenosis    • Chronic obstructive lung disease ([facility])    • CKD (chronic kidney disease) stage 4, GFR 15-29 ml/min ([facility])    • CKD (chronic kidney disease), symptom management only, stage 5 ([facility]) 10/5/2020    Results from last 7 days Lab Units 12/15/21 0548 12/14/21 1323 12/14/21 0916 CREATININE mg/dL 3.92* 3.21* 3.32*  Baseline creatinine 2-3 GFR 13-25 GFR 15 Dialysis MWF, sees Dr. Lauren Nephrology consult,, appreciate recommendations Continue Bumex 1mg bid daily Holding Bumex 2mg 4 times a week   • Colonic polyp    • Coronary arteriosclerosis    • Diabetes mellitus ([facility])    • Diabetic neuropathy ([facility])    • Ear pain, right 10/18/2021    - canal trauma due to patient scratching and DMT2 - added cortisporin ear drops   • Elevated  troponin 10/12/2021    -most likely from CKD -Trending down -Neg chest pain   • GERD (gastroesophageal reflux disease)    • GI bleed 5/13/2021    - GI will follow up outpatient - Protonix 40mg daily - Avoid medical DVT prophy and use mechanical at this time instead. - Continue to monitor - pill colonoscopy results showed AVMs   • History of transfusion    • Hypercholesterolemia    • Hypertension    • Hypomagnesemia 6/27/2021    Monitor and replace   • Morbid obesity ([facility])    • Nephrolithiasis    • Peripheral vascular disease ([facility])    • SIRS (systemic inflammatory response syndrome) ([facility]) 1/9/2022    Admission  - WBC 17.78   -   - RR 16 - 1/10: VSS/wnl - CXR - Mild pulm edema - Blood cultures no growth at 24 hours  - Procalcitonin 0.29 - UA : glucose 1000, negative Leucocytes/nitrate - Empiric Zosyn and Vancomcyin    • Sleep apnea    • Substance abuse ([facility])    • Vitamin D deficiency      Past Surgical History:   Procedure Laterality Date   • CARDIAC CATHETERIZATION N/A 7/14/2020   • CARDIAC CATHETERIZATION N/A 4/23/2021    Procedure: Left Heart Cath;  Surgeon: Melba Romo MD;  Location: [facility] CATH INVASIVE LOCATION;  Service: Cardiology;  Laterality: N/A;   • CARDIAC CATHETERIZATION N/A 4/30/2021    Procedure: Percutaneous Coronary Intervention;  Surgeon: Russell Voss MD;  Location: [facility] CATH INVASIVE LOCATION;  Service: Cardiovascular;  Laterality: N/A;   • CARDIAC CATHETERIZATION N/A 4/30/2021    Procedure: Stent NIKKI coronary;  Surgeon: Russell Voss MD;  Location: [facility] CATH INVASIVE LOCATION;  Service: Cardiovascular;  Laterality: N/A;   • CARDIAC CATHETERIZATION Left 11/13/2021    Procedure: Left Heart Cath;  Surgeon: Niall Rios MD;  Location: [facility] CATH INVASIVE LOCATION;  Service: Cardiology;  Laterality: Left;   • CAROTID STENT Left    • COLONOSCOPY     • COLONOSCOPY N/A 5/14/2021    Procedure: COLONOSCOPY;  Surgeon: Mingo Duarte MD;  Location: [facility]  ENDOSCOPY;  Service: Gastroenterology;  Laterality: N/A;   • CORONARY ARTERY BYPASS GRAFT N/A 2013    CABG X 3   • CYSTOSCOPY BLADDER STONE LITHOTRIPSY Bilateral    • ENDOSCOPY N/A 4/12/2021    Procedure: ESOPHAGOGASTRODUODENOSCOPY;  Surgeon: Mingo Duarte MD;  Location: [facility] ENDOSCOPY;  Service: Gastroenterology;  Laterality: N/A;   • ENDOSCOPY N/A 5/14/2021    Procedure: ESOPHAGOGASTRODUODENOSCOPY;  Surgeon: Mingo Duarte MD;  Location: [facility] ENDOSCOPY;  Service: Gastroenterology;  Laterality: N/A;   • ENDOSCOPY N/A 1/28/2022    Procedure: ESOPHAGOGASTRODUODENOSCOPY;  Surgeon: Mingo Duarte MD;  Location: [facility] ENDOSCOPY;  Service: Gastroenterology;  Laterality: N/A;   • INTERVENTIONAL RADIOLOGY PROCEDURE N/A 10/21/2021    Procedure: tunneled central venous catheter placement;  Surgeon: Donnie Robles MD;  Location: [facility] ANGIO INVASIVE LOCATION;  Service: Interventional Radiology;  Laterality: N/A;   • INTERVENTIONAL RADIOLOGY PROCEDURE N/A 1/27/2022    Procedure: tunneled central venous catheter placement;  Surgeon: Donnie Robles MD;  Location: [facility] ANGIO INVASIVE LOCATION;  Service: Interventional Radiology;  Laterality: N/A;      General Information     Row Name 04/19/22 1107          Physical Therapy Time and Intention    Document Type evaluation  -GB     Mode of Treatment individual therapy;physical therapy  -GB     Row Name 04/19/22 1107          General Information    Patient Profile Reviewed yes  -GB     Prior Level of Function all household mobility  -GB     Existing Precautions/Restrictions fall;oxygen therapy device and L/min  3L/min  -GB     Barriers to Rehab previous functional deficit;medically complex  -GB     Row Name 04/19/22 1107          Living Environment    People in Home spouse  -GB           User Key  (r) = Recorded By, (t) = Taken By, (c) = Cosigned By    Initials Name Provider Type    Gauri Kohli PT Physical Therapist                Mobility     Row Name 04/19/22 1128          Bed Mobility    Bed Mobility bed mobility (all) activities  -GB     All Activities, [place] (Bed Mobility) modified independence  -GB     Row Name 04/19/22 1128          Bed-Chair Transfer    Bed-Chair [place] (Transfers) modified independence  -GB     Assistive Device (Bed-Chair Transfers) walker, front-wheeled  -GB     Comment, (Bed-Chair Transfer) 4 ft to [facility]; needs cues to push off/reach back; gait in room 16 ft w/out LOB or SOA  -GB     Row Name 04/19/22 1128          Sit-Stand Transfer    Sit-Stand [place] (Transfers) modified independence  -GB     Assistive Device (Sit-Stand Transfers) walker, front-wheeled  -GB     Row Name 04/19/22 1128          Gait/Stairs (Locomotion)    [place] Level (Gait) modified independence;supervision  -GB     Assistive Device (Gait) walker, front-wheeled  -GB     Distance in Feet (Gait) 4,16; needed cues initially to push off surface and reach back for surface  -GB     Deviations/Abnormal Patterns (Gait) base of support, wide  -GB           User Key  (r) = Recorded By, (t) = Taken By, (c) = Cosigned By    Initials Name Provider Type    Gauri Kohli, PT Physical Therapist               Obj/Interventions     Row Name 04/19/22 1256          Range of Motion Comprehensive    General Range of Motion bilateral lower extremity ROM WFL  -GB     Row Name 04/19/22 1256          Strength Comprehensive (MMT)    Comment, General Manual Muscle Testing (MMT) Assessment 4+/5 hip knee ankle flx/ext  -GB     Row Name 04/19/22 1256          Balance    Balance Assessment sitting static balance;standing dynamic balance  -GB     Static Sitting Balance independent  -GB     Position, Sitting Balance unsupported;sitting edge of bed  -GB     Dynamic Standing Balance modified independence;supervision  -GB     Position/Device Used, Standing Balance walker, rolling  -GB     Row Name 04/19/22 1256          Sensory Assessment  (Somatosensory)    Sensory Assessment (Somatosensory) LE sensation intact  -GB           User Key  (r) = Recorded By, (t) = Taken By, (c) = Cosigned By    Initials Name Provider Type    Gauri Kohli, PT Physical Therapist               Goals/Plan     Row Name 04/19/22 1313          Bed Mobility Goal 1 (PT)    Activity/Assistive Device (Bed Mobility Goal 1, PT) bed mobility activities, all  -GB     [place] Level/Cues Needed (Bed Mobility Goal 1, PT) modified independence  -GB     Time Frame (Bed Mobility Goal 1, PT) by discharge  -GB     Progress/Outcomes (Bed Mobility Goal 1, PT) goal ongoing  -GB     Row Name 04/19/22 1313          Transfer Goal 1 (PT)    Activity/Assistive Device (Transfer Goal 1, PT) bed-to-chair/chair-to-bed;toilet  -GB     [place] Level/Cues Needed (Transfer Goal 1, PT) modified independence  -GB     Time Frame (Transfer Goal 1, PT) by discharge  -GB     Progress/Outcome (Transfer Goal 1, PT) goal ongoing  -GB     Row Name 04/19/22 1313          Gait Training Goal 1 (PT)    Activity/Assistive Device (Gait Training Goal 1, PT) gait (walking locomotion);assistive device use  -GB     [place] Level (Gait Training Goal 1, PT) modified independence  -GB     Distance (Gait Training Goal 1, PT) 100 ft or more per trip  -GB     Row Name 04/19/22 1313          Stairs Goal 1 (PT)    Activity/Assistive Device (Stairs Goal 1, PT) ascending stairs;descending stairs  -GB     [place] Level/Cues Needed (Stairs Goal 1, PT) modified independence  -GB     Number of Stairs (Stairs Goal 1, PT) 1 or more  -GB     Time Frame (Stairs Goal 1, PT) 3 days  -GB     Progress/Outcome (Stairs Goal 1, PT) goal not met  -GB           User Key  (r) = Recorded By, (t) = Taken By, (c) = Cosigned By    Initials Name Provider Type    Gauri Kohli, PT Physical Therapist               Clinical Impression     Row Name 04/19/22 1111          Pain    Pretreatment Pain Rating 10/10  -GB      Posttreatment Pain Rating --  -GB     Pain Location - Side/Orientation Bilateral  -GB     Pain Location generalized  -GB     Pain Location - foot;hand  -GB     Pre/Posttreatment Pain Comment cold yaz feet and hands'  -GB     Pain Intervention(s) Repositioned;Ambulation/increased activity  -GB     Row Name 04/19/22 1111          Plan of Care Review    Plan of Care Reviewed With patient  -GB     Outcome Evaluation PT Evaluation in room; pt demonstrated desire to be up and willing to walk/get active. She requires SBA-supervision of 1 for bed mobility, transfers w/ FWRW and gait in room as she his able to manage her02 tubing and safey habits. Her goal is to return home with spouse- we will work with her to prepare her for that goal. She has good rehab potential and good tolerance for mobility in room.  Rec home w/ spouse and HHPT  -GB     Row Name 04/19/22 1111          Therapy Assessment/Plan (PT)    Rehab Potential (PT) good, to achieve stated therapy goals  -GB     Criteria for Skilled Interventions Met (PT) yes  -GB     Therapy Frequency (PT) other (see comments)  5-7 d/w  -GB     Row Name 04/19/22 1111          Vital Signs    Pre Systolic BP Rehab 158  -GB     Pre Treatment Diastolic BP 72  -GB     Pretreatment Heart Rate (beats/min) 77  -GB     Posttreatment Heart Rate (beats/min) 88  -GB     Pre SpO2 (%) 98  3L/min  -GB     Post SpO2 (%) 98  -GB     O2 Delivery Post Treatment room air  -GB     Pre Patient Position Supine  -GB     Intra Patient Position Sitting  -GB     Post Patient Position Sitting  -GB     Row Name 04/19/22 1111          Positioning and Restraints    Pre-Treatment Position in bed  -GB     Post Treatment Position chair  -GB     In Chair call light within reach;encouraged to call for assist;exit alarm on;sitting;notified nsg  nsg aware  -GB           User Key  (r) = Recorded By, (t) = Taken By, (c) = Cosigned By    Initials Name Provider Type    Gauri Kohli, PT Physical  Therapist               Outcome Measures     Row Name 04/19/22 1322          How much help from another person do you currently need...    Turning from your back to your side while in flat bed without using bedrails? 4  -GB     Moving from lying on back to sitting on the side of a flat bed without bedrails? 4  -GB     Moving to and from a bed to a chair (including a wheelchair)? 4  -GB     Standing up from a chair using your arms (e.g., wheelchair, bedside chair)? 4  -GB     Climbing 3-5 steps with a railing? 3  -GB     To walk in hospital room? 3  -GB     AM-PAC 6 Clicks Score (PT) 22  -GB     Row Name 04/19/22 1322          Functional Assessment    Outcome Measure Options AM-PAC 6 Clicks Basic Mobility (PT)  -           User Key  (r) = Recorded By, (t) = Taken By, (c) = Cosigned By    Initials Name Provider Type    Gauri Kohli, PT Physical Therapist                             Physical Therapy Education                 Title: PT OT SLP Therapies (Done)     Topic: Physical Therapy (Done)     Point: Mobility training (Done)     Learning Progress Summary           Patient Acceptance, E,D, DU,VU by JUANCARLOS at 4/19/2022 1322    Comment: POC  push off surface, reach for surface w/ sit<>stand                   Point: Home exercise program (Done)     Learning Progress Summary           Patient Acceptance, E,D, DU,VU by JUANCARLOS at 4/19/2022 1322    Comment: POC  push off surface, reach for surface w/ sit<>stand                   Point: Body mechanics (Done)     Learning Progress Summary           Patient Acceptance, E,D, DU,VU by JUANCARLOS at 4/19/2022 1322    Comment: POC  push off surface, reach for surface w/ sit<>stand                   Point: Precautions (Done)     Learning Progress Summary           Patient Acceptance, E,D, DU,VU by JUANCARLOS at 4/19/2022 1322    Comment: POC  push off surface, reach for surface w/ sit<>stand                               User Key     Initials Effective Dates Name Provider Type Discipline     JUANCARLOS 06/16/21 -  Gauri Anderson PT Physical Therapist PT              PT Recommendation and Plan     Plan of Care Reviewed With: patient  Outcome Evaluation: PT Evaluation in room; pt demonstrated desire to be up and willing to walk/get active. She requires SBA-supervision of 1 for bed mobility, transfers w/ FWRW and gait in room as she his able to manage her02 tubing and safey habits. Her goal is to return home with spouse- we will work with her to prepare her for that goal. She has good rehab potential and good tolerance for mobility in room.  Rec home w/ spouse and HHPT     Time Calculation:    PT Charges     Row Name 04/19/22 1107             Time Calculation    Start Time 1107  -GB      Stop Time 1148  -GB      Time Calculation (min) 41 min  -GB      PT Received On 04/19/22  -GB      PT Goal Re-Cert Due Date 04/28/22  -GB            User Key  (r) = Recorded By, (t) = Taken By, (c) = Cosigned By    Initials Name Provider Type    Gauri Kohli, PT Physical Therapist              Therapy Charges for Today     Code Description Service Date Service Provider Modifiers Qty    67840920936 HC PT EVAL MOD COMPLEXITY 3 4/19/2022 Gauri Anderson, PT GP 1    14018174734 HC PT THER SUPP EA 15 MIN 4/19/2022 Gauri Anderson PT GP 1          PT G-Codes  Outcome Measure Options: AM-PAC 6 Clicks Basic Mobility (PT)  AM-PAC 6 Clicks Score (PT): 22    Gauri Anderson PT  4/19/2022

## 2022-04-19 NOTE — CONSULTS
[facility] [place] [facility] Consult    Referring Provider: BOBBY    Reason for Consultation: abnormal EKG    Patient Care Team:  Rianna Macias MD as PCP - General (Family Medicine)    Chief complaint   Chief Complaint   Patient presents with    Shortness of Breath    Arm Injury     Left       Subjective .     History of present illness:     Ms. Slater is a 62 year old patient who presented to [facility] ER following a missed dialysis treatment. She has since been dialyzed on schedule with volume removal.   She has history of significant CAD. She has DM. Last hospitalized for HHNK.     Cardiology was consulted due to abnormal EKG. Concern for AF, but it was ectopy and PACs.     Negative troponin. Denies chest discomfort.       The CVD Risk score (D'Agostino, et al., 2008) failed to calculate for the following reasons:    The patient has a prior MI, stroke, CHF, or peripheral vascular disease diagnosis      Review of Systems  Review of Systems   Respiratory: Negative for chest tightness and shortness of breath.        History  Past Medical History:   Diagnosis Date    Acute blood loss anemia 4/16/2017    Likely due to gastric oozing at this time. - Dr. Duarte (GI) was consulted and has now signed off, will follow up outpatient - pill colonoscopy showed AVMs - continue to monitor    Acute cystitis with hematuria 3/31/2021    1/13: IV Rocephin 1 gm q 24 1/14 : transitioned  to omnicef 300mg. Urine cultures resulted and did not show growth. Omnicef discontinued as patient is asymptomatic    Altered mental status 1/9/2022    - AMS on presentation - initial ABG pH 7.3, CO2 34 - Procal 0.29 - UA negative for acute cysitits -CTA head wnl  - Empiric Zosyn and Vancomycin -Lactate 2.5 on admission  - blood cultures no growth at 24 hours      Anxiety     CAD (coronary artery disease) 4/24/2021    S/P 3 stents 5/1/2021 for [facility] Continue ASA 81mg & Clopidogrel 75mg Continue Atorvastatin 40mg    Carotid artery stenosis      Chronic obstructive lung disease (HCC)     CKD (chronic kidney disease) stage 4, GFR 15-29 ml/min (HCC)     CKD (chronic kidney disease), symptom management only, stage 5 (HCC) 10/5/2020    Results from last 7 days Lab Units 12/15/21 0548 12/14/21 1323 12/14/21 0916 CREATININE mg/dL 3.92* 3.21* 3.32*  Baseline creatinine 2-3 GFR 13-25 GFR 15 Dialysis MWF, sees Dr. Lauren Nephrology consult,, appreciate recommendations Continue Bumex 1mg bid daily Holding Bumex 2mg 4 times a week    Colonic polyp     Coronary arteriosclerosis     Diabetes mellitus (HCC)     Diabetic neuropathy ([facility])     Ear pain, right 10/18/2021    - canal trauma due to patient scratching and DMT2 - added cortisporin ear drops    Elevated troponin 10/12/2021    -most likely from CKD -Trending down -Neg chest pain    GERD (gastroesophageal reflux disease)     GI bleed 5/13/2021    - GI will follow up outpatient - Protonix 40mg daily - Avoid medical DVT prophy and use mechanical at this time instead. - Continue to monitor - pill colonoscopy results showed AVMs    History of transfusion     Hypercholesterolemia     Hypertension     Hypomagnesemia 6/27/2021    Monitor and replace    Morbid obesity ([facility])     Nephrolithiasis     Peripheral vascular disease ([facility])     SIRS (systemic inflammatory response syndrome) ([facility]) 1/9/2022    Admission  - WBC 17.78   -   - RR 16 - 1/10: VSS/wnl - CXR - Mild pulm edema - Blood cultures no growth at 24 hours  - Procalcitonin 0.29 - UA : glucose 1000, negative Leucocytes/nitrate - Empiric Zosyn and Vancomcyin     Sleep apnea     Substance abuse ([facility])     Vitamin D deficiency    ,   Past Surgical History:   Procedure Laterality Date    CARDIAC CATHETERIZATION N/A 7/14/2020    CARDIAC CATHETERIZATION N/A 4/23/2021    Procedure: Left Heart Cath;  Surgeon: Melba Romo MD;  Location: [facility] CATH INVASIVE LOCATION;  Service: Cardiology;  Laterality: N/A;    CARDIAC CATHETERIZATION N/A 4/30/2021    Procedure:  Percutaneous Coronary Intervention;  Surgeon: Russell Voss MD;  Location: [facility] CATH INVASIVE LOCATION;  Service: Cardiovascular;  Laterality: N/A;    CARDIAC CATHETERIZATION N/A 4/30/2021    Procedure: Stent NIKKI coronary;  Surgeon: Russell Voss MD;  Location: [facility] CATH INVASIVE LOCATION;  Service: Cardiovascular;  Laterality: N/A;    CARDIAC CATHETERIZATION Left 11/13/2021    Procedure: Left Heart Cath;  Surgeon: Niall Rios MD;  Location: [facility] CATH INVASIVE LOCATION;  Service: Cardiology;  Laterality: Left;    CAROTID STENT Left     COLONOSCOPY      COLONOSCOPY N/A 5/14/2021    Procedure: COLONOSCOPY;  Surgeon: Mingo Duarte MD;  Location: [facility] ENDOSCOPY;  Service: Gastroenterology;  Laterality: N/A;    CORONARY ARTERY BYPASS GRAFT N/A 2013    CABG X 3    CYSTOSCOPY BLADDER STONE LITHOTRIPSY Bilateral     ENDOSCOPY N/A 4/12/2021    Procedure: ESOPHAGOGASTRODUODENOSCOPY;  Surgeon: Mingo Duarte MD;  Location: [facility] ENDOSCOPY;  Service: Gastroenterology;  Laterality: N/A;    ENDOSCOPY N/A 5/14/2021    Procedure: ESOPHAGOGASTRODUODENOSCOPY;  Surgeon: Mingo Duarte MD;  Location: [facility] ENDOSCOPY;  Service: Gastroenterology;  Laterality: N/A;    ENDOSCOPY N/A 1/28/2022    Procedure: ESOPHAGOGASTRODUODENOSCOPY;  Surgeon: Mingo Duarte MD;  Location: [facility] ENDOSCOPY;  Service: Gastroenterology;  Laterality: N/A;    INTERVENTIONAL RADIOLOGY PROCEDURE N/A 10/21/2021    Procedure: tunneled central venous catheter placement;  Surgeon: Donnie Robles MD;  Location: [facility] ANGIO INVASIVE LOCATION;  Service: Interventional Radiology;  Laterality: N/A;    INTERVENTIONAL RADIOLOGY PROCEDURE N/A 1/27/2022    Procedure: tunneled central venous catheter placement;  Surgeon: Donnie Robles MD;  Location: [facility] ANGIO INVASIVE LOCATION;  Service: Interventional Radiology;  Laterality: N/A;   ,   Family History   Problem Relation Age of Onset    Heart disease  Mother     Lung cancer Mother     Heart disease Father     Heart attack Father     Diabetes Father     Heart disease Half-Sister         Dad's side    Heart disease Brother     No Known Problems Sister     No Known Problems Sister     No Known Problems Sister     No Known Problems Sister     No Known Problems Sister     Pancreatic cancer Half-Sister         Dad's side    No Known Problems Brother     No Known Problems Brother     Heart attack Half-Brother     Heart attack Half-Brother     No Known Problems Maternal Grandmother     No Known Problems Maternal Grandfather     No Known Problems Paternal Grandmother     No Known Problems Paternal Grandfather    ,   Social History     Tobacco Use    Smoking status: Former Smoker     Packs/day: 0.25     Years: 46.00     Pack years: 11.50     Types: Cigarettes     Start date: 1999     Quit date: 2/15/2022     Years since quittin.1    Smokeless tobacco: Never Used    Tobacco comment: only smoking 5 a day - quit 2021   Substance Use Topics    Alcohol use: No    Drug use: Not Currently     Types: LSD, Marijuana, Methamphetamines   ,   Medications Prior to Admission   Medication Sig Dispense Refill Last Dose    acetaminophen (Tylenol 8 Hour) 650 MG 8 hr tablet Take 1 tablet by mouth Every 8 (Eight) Hours As Needed for Mild Pain . 90 tablet 0 Past Week at Unknown time    albuterol (PROVENTIL) (2.5 MG/3ML) 0.083% nebulizer solution Inhale the contents of 1 vial by nebulization Every 4 (Four) Hours As Needed for Wheezing. 75 mL 3 2022 at Unknown time    albuterol sulfate  (90 Base) MCG/ACT inhaler Inhale 2 puffs Every 4 (Four) Hours As Needed for Wheezing. 18 g 1 2022 at Unknown time    aspirin (aspirin) 81 MG EC tablet Take 1 tablet by mouth Daily. 60 tablet 5 4/15/2022 at Unknown time    atorvastatin (LIPITOR) 20 MG tablet Take 1 tablet by mouth every night at bedtime. 90 tablet 1 2022 at Unknown time    Blood Glucose Monitoring Suppl ([employer]  "Blood Glucose Meter) w/Device kit 1 each 3 (Three) Times a Day. 1 kit 3 Past Week at Unknown time    bumetanide (BUMEX) 2 MG tablet Take 1 tablet by mouth 4 (Four) Times a Week. Take on non-hemodialysis days. 16 tablet 0 4/15/2022 at Unknown time    Capsaicin 0.035 % cream Apply 3 g topically 3 (Three) Times a Day As Needed (ankle pain). 42.5 g 2 Past Week at Unknown time    clopidogrel (PLAVIX) 75 MG tablet Take 1 tablet by mouth Daily. 30 tablet 5 4/15/2022 at Unknown time    Continuous Blood Gluc  (FreeStyle Jade 2 [place]) device 1 each Continuous. 1 each 0 Past Week at Unknown time    Continuous Blood Gluc Sensor (FreeStyle Jade 2 Sensor) misc 1 each Every 14 (Fourteen) Days. 2 each 11 Past Week at Unknown time    cyclobenzaprine (FLEXERIL) 5 MG tablet Take 10 mg by mouth.   4/15/2022 at Unknown time    Diclofenac Sodium (VOLTAREN) 1 % gel gel Apply 4 g topically to the appropriate area as directed 4 (Four) Times a Day As Needed (Ankle pain). 350 g 2 Past Week at Unknown time    DULoxetine (CYMBALTA) 20 MG capsule Take 1 capsule by mouth Daily for 30 days. 90 capsule 1     Easy Touch Insulin Syringe 30G X 5/16\" 0.5 ML misc USE AS DIRECTED WITH LEVEMIR   Past Week at Unknown time    EASY TOUCH PEN NEEDLES 31G X 8 MM misc    Past Week at Unknown time    gabapentin (NEURONTIN) 800 MG tablet Take 1 tablet by mouth 3 (Three) Times a Day. 90 tablet 2 4/15/2022 at Unknown time    glucose blood test strip Use as instructed 100 each 12 Past Week at Unknown time    insulin detemir (LEVEMIR) 100 UNIT/ML injection Inject 20 Units under the skin into the appropriate area as directed Every Night. (Patient taking differently: Inject 30 Units under the skin into the appropriate area as directed 2 (Two) Times a Day.) 15 mL 12 4/14/2022 at Unknown time    Insulin Lispro, 1 Unit Dial, (HUMALOG) 100 UNIT/ML solution pen-injector Inject 12 Units under the skin into the appropriate area as directed 3 (Three) Times a Day " With Meals. 30 mL 12 4/15/2022 at Unknown time    Insulin Pen Needle (NovoFine) 30G X 8 MM misc As directed 4 times daily 100 each 11 Past Week at Unknown time    Insulin Pen Needle 31G X 8 MM misc Use to inject insulin 4 (Four) Times a Day as directed. 120 each 12 Past Week at Unknown time    ipratropium-albuterol (DUO-NEB) 0.5-2.5 mg/3 ml nebulizer Take 3 mL by nebulization 4 (Four) Times a Day.   4/14/2022 at Unknown time    isosorbide mononitrate (IMDUR) 30 MG 24 hr tablet Take 1 tablet by mouth Every Morning. 90 tablet 1 4/15/2022 at Unknown time    lidocaine (LIDODERM) 5 % APPLY TO THE AFFECTED AREA(S) TOPICALLY TWO TIMES A DAY. REMOVE NIGHTLY 30 patch 1 Past Week at Unknown time    lisinopril (PRINIVIL,ZESTRIL) 5 MG tablet Take 1 tablet by mouth Daily. 90 tablet 1 4/15/2022 at Unknown time    metoprolol tartrate (LOPRESSOR) 25 MG tablet Take 1 tablet by mouth Every 12 (Twelve) Hours. 60 tablet 3 4/15/2022 at Unknown time    montelukast (SINGULAIR) 10 MG tablet Take 1 tablet by mouth Every Night. 30 tablet 5 4/14/2022 at Unknown time    muscle rub (BenGay) 10-15 % cream cream Apply 1 application topically to the appropriate area as directed 4 (Four) Times a Day As Needed for buttock pain. 85 g 1 Past Week at Unknown time    O2 (OXYGEN) Inhale 3 L/min Continuous.   4/15/2022 at Unknown time    ondansetron ODT (Zofran ODT) 4 MG disintegrating tablet Place 1 tablet on the tongue Every 8 (Eight) Hours As Needed for Nausea or Vomiting. 30 tablet 0 Past Month at Unknown time    oxybutynin XL (DITROPAN-XL) 5 MG 24 hr tablet Take 1 tablet by mouth Daily. 90 tablet 1 4/15/2022 at Unknown time    pantoprazole (PROTONIX) 40 MG EC tablet Take 1 tablet by mouth Every Night. 30 tablet 5 4/15/2022 at Unknown time    ranolazine (RANEXA) 500 MG 12 hr tablet Take 1 tablet by mouth Every 12 (Twelve) Hours. 60 tablet 0 4/15/2022 at Unknown time    sevelamer (RENVELA) 800 MG tablet Take 800 mg by mouth 3 (Three) Times a Day With  "Meals.   4/15/2022 at Unknown time    levothyroxine (SYNTHROID, LEVOTHROID) 25 MCG tablet Take 25 mcg by mouth Daily. (Patient not taking: No sig reported)       nitroglycerin (NITROSTAT) 0.4 MG SL tablet Place 0.4 mg under the tongue Every 5 (Five) Minutes As Needed for Chest Pain (x 3 doses).         ALLERGIES  Adhesive tape, Latex, Nsaids, and Other    The following portions of the patient's history were reviewed and updated as appropriate: allergies, current medications, past family history, past medical history, past social history, past surgical history and problem list.     Old and outside records reviewed (if available) and pertinent information is included in the objective data.     Objective     Vital Sign Min/Max for last 24 hours  Temp  Min: 96 °F (35.6 °C)  Max: 98.7 °F (37.1 °C)   BP  Min: 120/90  Max: 158/72   Pulse  Min: 61  Max: 127   Resp  Min: 16  Max: 20   SpO2  Min: 94 %  Max: 99 %   Flow (L/min)  Min: 2  Max: 3   No data recorded     Flowsheet Rows      Flowsheet Row First Filed Value   Admission Height 157.5 cm (62\") Documented at 04/15/2022 1323   Admission Weight 136 kg (300 lb) Documented at 04/15/2022 1323               Physical Exam:  Physical Exam  Cardiovascular:      Rate and Rhythm: Normal rate and regular rhythm.   Pulmonary:      Effort: Pulmonary effort is normal.      Breath sounds: Normal breath sounds.            Results Reviewed by myself:     Results from last 7 days   Lab Units 04/19/22  0641 04/18/22  0626 04/17/22  1803   SODIUM mmol/L 135* 133* 128*   POTASSIUM mmol/L 3.6 4.0 4.3   CHLORIDE mmol/L 98 97* 91*   CO2 mmol/L 26.0 22.0 24.0   BUN mg/dL 39* 62* 54*   CREATININE mg/dL 2.83* 3.28* 3.42*   CALCIUM mg/dL 8.6 8.3* 8.8   BILIRUBIN mg/dL 0.4 0.4 0.4   ALK PHOS U/L 170* 172* 216*   ALT (SGPT) U/L 8 7 6   AST (SGOT) U/L 10 9 9   GLUCOSE mg/dL 147* 99 276*       Estimated Creatinine Clearance: 27 mL/min (A) (by C-G formula based on SCr of 2.83 mg/dL (H)).    Results from " last 7 days   Lab Units 04/17/22  1803   MAGNESIUM mg/dL 2.2       Results from last 7 days   Lab Units 04/19/22  0641 04/18/22  0626 04/17/22  0639 04/16/22  0604 04/15/22  1340   WBC 10*3/mm3 7.35 7.90 9.77 8.58 8.08   HEMOGLOBIN g/dL 8.7* 8.1* 7.4* 7.4* 7.2*   PLATELETS 10*3/mm3 290 314 286 267 287             Lab Results   Component Value Date    CKTOTAL 46 03/14/2022    CKMB 0.93 08/26/2017    TROPONINI 0.01 08/21/2021    TROPONINT 0.016 04/17/2022     Lab Results   Component Value Date    PROBNP 7,506.0 (H) 04/15/2022       I/O last 3 completed shifts:  In: 360 [P.O.:360]  Out: 7400 [Urine:2400; Other:5000]    Cardiographics  ECG/EMG Results (last 24 hours)       Procedure Component Value Units Date/Time    ECG 12 Lead [[id number]] Collected: 04/18/22 2038     Updated: 04/18/22 2046     QT Interval 326 ms      QTC Interval 439 ms     Narrative:      Test Reason : tachycardia  Blood Pressure :   */*   mmHG  Vent. Rate : 109 BPM     Atrial Rate : 109 BPM     P-R Int : 192 ms          QRS Dur : 110 ms      QT Int : 326 ms       P-R-T Axes :  43  24 120 degrees     QTc Int : 439 ms    Sinus tachycardia with frequent Premature ventricular complexes with junctional escape complexes  Inferior infarct (cited on or before 18-APR-2022)  ST & T wave abnormality, consider lateral ischemia  Abnormal ECG  When compared with ECG of 18-APR-2022 16:15,  Sinus rhythm is now with junctional escape complexes  QT has shortened    Referred By:            Confirmed By:     ECG 12 Lead [[id number]] Collected: 04/18/22 1615     Updated: 04/18/22 2048     QT Interval 370 ms      QTC Interval 498 ms     Narrative:      Test Reason : A-FIB  Blood Pressure :   */*   mmHG  Vent. Rate : 109 BPM     Atrial Rate : 109 BPM     P-R Int : 192 ms          QRS Dur : 116 ms      QT Int : 370 ms       P-R-T Axes :   *  -3 113 degrees     QTc Int : 498 ms    Sinus tachycardia with frequent Premature ventricular complexes  Cannot rule out Inferior  infarct , age undetermined  ST & T wave abnormality, consider lateral ischemia  Abnormal ECG  When compared with ECG of 17-APR-2022 17:43,  Previous ECG has undetermined rhythm, needs review  Right bundle branch block is no longer Present    Referred By:            Confirmed By:     SCANNED - TELEMETRY   [[id number]] Resulted: 04/15/22     Updated: 04/18/22 2117    SCANNED - TELEMETRY   [[id number]] Resulted: 04/15/22     Updated: 04/18/22 2132    SCANNED - TELEMETRY   [[id number]] Resulted: 04/15/22     Updated: 04/18/22 2201            Results for orders placed during the hospital encounter of 01/09/22    Adult Transthoracic Echo Complete W/ Cont if Necessary Per Protocol    Interpretation Summary  · The study is technically difficult for diagnosis. The quality of the study is limited due to patient body habitus and lung disease. Verbal consent was obtained from the patient to use Definity image enhancer in order to optimize the study.  · Left ventricular wall thickness is consistent with mild concentric hypertrophy.  · Estimated left ventricular EF = 65% Left ventricular ejection fraction appears to be 61 - 65%. Left ventricular systolic function is normal.  · Left ventricular diastolic function is consistent with (grade Ia w/high LAP) impaired relaxation.      XR Chest 2 View    Result Date: 4/15/2022  Cardiomegaly. Central vascular congestion. Diffuse interstitial opacities likely edema. Right base opacity consistent with effusion and airspace consolidation. Electronically signed by:  Froylan Bennett MD  4/15/2022 2:58 PM CDT Workstation: [id number]    XR Chest 2 View    Result Date: 3/28/2022  1. Mild cardiomegaly with some pulmonary vascular congestion and small right pleural effusion similar to prior exam. Electronically signed by:  Huy Esquivel MD  3/28/2022 1:35 PM CDT Workstation: [id number]    XR Wrist 3+ View Left    Result Date: 4/15/2022  Impression: No radiographic evidence of acute fracture. Electronically  signed by:  Umesh Parra MD  4/15/2022 3:24 PM CDT Workstation: [id number]    US Venous Doppler Lower Extremity Bilateral (duplex)    Result Date: 4/18/2022  IMPRESSION 1.  No evidence of deep venous thrombosis of the bilateral lower extremities.  Electronically signed by:  Richmond Bernal MD  4/18/2022 8:53 PM CDT Workstation: [id number]      Intake/Output         04/18/22 0700 - 04/19/22 0659    Intake (ml) 240    Output (ml) 5500    Net (ml) -5260              Assessment/Plan     Abnormal EKG  - provided EKG reading. No AF. PVCs. Cont BB.  - no other cardiac complaints for consultation.       Disposition: per primary team.     I discussed the patient's findings and my recommendations with patient and Dr. Davis            This document has been electronically signed by BRIDGETT Watres on April 19, 2022 11:25 CDT      Electronically signed by BRIDGETT Waters, 04/19/22, 11:25 AM CDT.    Patient examined and chart reviewed.  Agree with assessment and plan as outlined by BRIDGETT Orantes.  Patient evaluated by me at 12:20 PM today    This is a 62-year-old female with past medical history of hypertension, hyperlipidemia, obesity, IDDM, CKD, CAD s/p CABG and COPD (oxygen dependent), sleep apnea on CPAP, peripheral vascular disease with history of left carotid endarterectomy. She has had multiple cardiac interventions in the past.  She was actually referred for redo CABG to [place] but instead underwent complex PCI to discrete lesion and left main coronary artery/proximal left circumflex coronary artery and proximal SVG to ramus intermedius. She subsequently presented in November 2021 with unstable angina and underwent cardiac catheterization and PCI to left main/proximal circumflex coronary artery by Dr. Rios.  She has had multiple previous hospital admissions for mental status changes, uncontrolled diabetes, DKA/HHS.    Patient was admitted following missed hemodialysis and mental status changes and  evidence of volume overload.  On the monitor/EKG she was noted to have irregular heart rhythm and there was question about atrial fibrillation.  Hence cardiology consultation was requested.    Symptomatically, the patient has improved considerably following hemodialysis and denied any cardiac symptoms with no chest pain, shortness of breath or palpitation.    On review of the monitor and recently performed EKGs she has underlying sinus rhythm with right bundle branch block.  PACs and occasional PVCs.  No sustained arrhythmias noted.  No atrial fibrillation noted.    Echocardiogram in January 2022 showed overall normal LV systolic function with an EF of 65%.  There was grade 1A diastolic dysfunction.    On examination:  Patient was comfortable at rest.  Blood pressure 140/70 mmHg, heart rate 61 bpm  Exam of the heart showed normal first and second heart sounds with no S3 or S4.  Lung exam showed normal breath sounds with no rales or rhonchi.  Abdomen soft with no mass or tenderness.    Labs, EKG evaluated.    Continue current management.  No definite indication for anticoagulation at this time.  Patient on Ranexa, metoprolol tartrate, lisinopril, isosorbide mononitrate, antiplatelet therapy with aspirin and Plavix, lipid-lowering therapy with atorvastatin and diuretics.    Electronically signed by Margarito Davis MD, 04/19/22, 2:55 PM CDT.

## 2022-04-19 NOTE — NURSING NOTE
Pt picked a scab and now has an open wound on her left leg bandaged with non adherent and silk tape.

## 2022-04-20 ENCOUNTER — TRANSITIONAL CARE MANAGEMENT TELEPHONE ENCOUNTER (OUTPATIENT)
Dept: CALL CENTER | Facility: HOSPITAL | Age: 62
End: 2022-04-20

## 2022-04-20 DIAGNOSIS — E11.69 DIABETES MELLITUS TYPE 2 IN OBESE: ICD-10-CM

## 2022-04-20 DIAGNOSIS — E66.9 DIABETES MELLITUS TYPE 2 IN OBESE: ICD-10-CM

## 2022-04-20 LAB
B-OH-BUTYR SERPL-MCNC: 0.3 MG/DL
BACTERIA SPEC AEROBE CULT: NORMAL
BACTERIA SPEC AEROBE CULT: NORMAL

## 2022-04-20 NOTE — OUTREACH NOTE
Call Center TCM Note    Flowsheet Row Responses   [facility] patient discharged from? [place]   Does the patient have one of the following disease processes/diagnoses(primary or secondary)? Other   TCM attempt successful? No   Unsuccessful attempts Attempt 2          Nan Rodriguez RN    4/20/2022, 12:29 CDT

## 2022-04-20 NOTE — OUTREACH NOTE
Call Center TCM Note    Flowsheet Row Responses   [facility] patient discharged from? [place]   Does the patient have one of the following disease processes/diagnoses(primary or secondary)? Other   TCM attempt successful? No  [Verbal release is over a year old]   Unsuccessful attempts Attempt 1          Nan Rodriguez RN    4/20/2022, 12:49 EDT

## 2022-04-21 ENCOUNTER — TRANSITIONAL CARE MANAGEMENT TELEPHONE ENCOUNTER (OUTPATIENT)
Dept: CALL CENTER | Facility: HOSPITAL | Age: 62
End: 2022-04-21

## 2022-04-21 RX ORDER — EMPAGLIFLOZIN 25 MG/1
TABLET, FILM COATED ORAL DAILY
Qty: 30 TABLET | Refills: 5 | OUTPATIENT
Start: 2022-04-21

## 2022-04-21 RX ORDER — PANTOPRAZOLE SODIUM 40 MG/1
40 TABLET, DELAYED RELEASE ORAL NIGHTLY
Qty: 30 TABLET | Refills: 6 | Status: SHIPPED | OUTPATIENT
Start: 2022-04-21 | End: 2022-04-26 | Stop reason: SDUPTHER

## 2022-04-21 RX ORDER — RANOLAZINE 500 MG/1
500 TABLET, EXTENDED RELEASE ORAL EVERY 12 HOURS SCHEDULED
Qty: 60 TABLET | Refills: 0 | Status: SHIPPED | OUTPATIENT
Start: 2022-04-21 | End: 2022-04-26 | Stop reason: SDUPTHER

## 2022-04-21 NOTE — DISCHARGE SUMMARY
DISCHARGE SUMMARY    PATIENT NAME: Yamileth Slater       PHYSICIAN: Rianna Macias MD  : 1960  MRN: [id number]    ADMITTED: 4/15/2022     DISCHARGED: 2022    ADMISSION DIAGNOSES:  Active Hospital Problems    Diagnosis  POA   • **Admission for dialysis (HCC) [Z99.2]  Not Applicable   • ESRD on hemodialysis (HCC) [N18.6, Z99.2]  Not Applicable   • Type 2 diabetes mellitus with autonomic neuropathy (HCC) [E11.43]  Yes   • Anemia due to chronic kidney disease, on chronic dialysis (HCC) [N18.6, D63.1, Z99.2]  Not Applicable   • Hypothyroidism [E03.9]  Yes   • COPD (chronic obstructive pulmonary disease) (HCC) [J44.9]  Yes   • Mixed hyperlipidemia [E78.2]  Yes   • GERD (gastroesophageal reflux disease) [K21.9]  Yes   • Hypertension [I10]  Yes      Resolved Hospital Problems   No resolved problems to display.     DISCHARGE DIAGNOSES:   Active Hospital Problems    Diagnosis  POA   • **Admission for dialysis (HCC) [Z99.2]  Not Applicable   • ESRD on hemodialysis (HCC) [N18.6, Z99.2]  Not Applicable   • Type 2 diabetes mellitus with autonomic neuropathy (HCC) [E11.43]  Yes   • Anemia due to chronic kidney disease, on chronic dialysis (HCC) [N18.6, D63.1, Z99.2]  Not Applicable   • Hypothyroidism [E03.9]  Yes   • COPD (chronic obstructive pulmonary disease) (HCC) [J44.9]  Yes   • Mixed hyperlipidemia [E78.2]  Yes   • GERD (gastroesophageal reflux disease) [K21.9]  Yes   • Hypertension [I10]  Yes      Resolved Hospital Problems   No resolved problems to display.       SERVICE: Family Medicine Residency  Attending: Radha Argueta MD  Resident: Rianna Macias MD    CONSULTS:   Consult Orders (all) (From admission, onward)     Start     Ordered    22 0752  Inpatient Cardiology Consult  Once        Specialty:  Cardiology  Provider:  Margarito Davis MD    22 0752    04/15/22 1706  Inpatient Nephrology Consult  Once        Specialty:  Nephrology  Provider:  Ace Lauren MD     "04/15/22 1706                PROCEDURES:   HD on 4/16 and 4/18    HISTORY OF PRESENT ILLNESS:   HPI taken from Rianna Macias MD on 4/15/2022:  \"Yamileth Slater is a 62 y.o. female with a CMH of DM with autonomic neuropathy, COPD on  3 NL NC at home, ESRD on hemodialysis, hypothyroidism, HDL, HDL, GERD, HTN, anemia of chronic disease who presented to the ED complaining of shortness of breath and chest pain. Patient reports that she missed dialysis this morning because she felt that she was not treated right during her last session at the dialysis facility (one of the nurses did not want to bring her a stool to put her feet up during her dialysis).   In the ED, patient got an EKG which was significant for a new RBBB. BNP was elevated at 7506 (from 5421).  Troponin was WNL. CXR was significant for diffuse interstitial opacities likely to edema and right base opacity consistent with effusion and airspace consolidation. She was given morphine for the pain and one Duoneb treatment.  At the time of evaluation, she denied any chest pain or shortness of breath. Patient was admitted to receive dialysis tomorrow 4/16/2022.  Of note, on Monday, patient was transferring herself form her chair to bed and slowly fell down and hurt her left wrist. At that time the EMS who pick her up after the fall, and she states that he squeezed really hard. Left wrist XR is negative for fracture.\"    DIAGNOSTIC DATA:   Lab Results (last 24 hours)     Procedure Component Value Units Date/Time    POC Glucose Once [[id number]]  (Abnormal) Collected: 04/19/22 1104    Specimen: Blood Updated: 04/19/22 1204     Glucose 267 mg/dL      Comment: RN NotifiedOperator: [id number] HADLEY TAYLORMeter ID: [id number]       POC Glucose Once [[id number]]  (Normal) Collected: 04/19/22 0725    Specimen: Blood Updated: 04/19/22 0748     Glucose 125 mg/dL      Comment: RN NotifiedOperator: [id number] HADLEY TAYLORMeter ID: [id number]       Comprehensive " Metabolic Panel [[id number]]  (Abnormal) Collected: 04/19/22 0641    Specimen: Blood Updated: 04/19/22 0724     Glucose 147 mg/dL      BUN 39 mg/dL      Creatinine 2.83 mg/dL      Sodium 135 mmol/L      Potassium 3.6 mmol/L      Chloride 98 mmol/L      CO2 26.0 mmol/L      Calcium 8.6 mg/dL      Total Protein 6.4 g/dL      Albumin 3.10 g/dL      ALT (SGPT) 8 U/L      AST (SGOT) 10 U/L      Alkaline Phosphatase 170 U/L      Total Bilirubin 0.4 mg/dL      Globulin 3.3 gm/dL      A/G Ratio 0.9 g/dL      BUN/Creatinine Ratio 13.8     Anion Gap 11.0 mmol/L      eGFR 18.2 mL/min/1.73      Comment: National Kidney Foundation and American Society of Nephrology (ASN) Task Force recommended calculation based on the Chronic Kidney Disease Epidemiology Collaboration (CKD-EPI) equation refit without adjustment for race.       Narrative:      GFR Normal >60  Chronic Kidney Disease <60  Kidney Failure <15      CBC & Differential [[id number]]  (Abnormal) Collected: 04/19/22 0641    Specimen: Blood Updated: 04/19/22 0654    Narrative:      The following orders were created for panel order CBC & Differential.  Procedure                               Abnormality         Status                     ---------                               -----------         ------                     CBC Auto Differential[[id number]]        Abnormal            Final result                 Please view results for these tests on the individual orders.    CBC Auto Differential [[id number]]  (Abnormal) Collected: 04/19/22 0641    Specimen: Blood Updated: 04/19/22 0654     WBC 7.35 10*3/mm3      RBC 3.28 10*6/mm3      Hemoglobin 8.7 g/dL      Hematocrit 28.6 %      MCV 87.2 fL      MCH 26.5 pg      MCHC 30.4 g/dL      RDW 16.2 %      RDW-SD 51.1 fl      MPV 8.5 fL      Platelets 290 10*3/mm3      Neutrophil % 62.7 %      Lymphocyte % 23.5 %      Monocyte % 9.0 %      Eosinophil % 2.6 %      Basophil % 1.0 %      Immature Grans % 1.2 %      Neutrophils, Absolute  4.61 10*3/mm3      Lymphocytes, Absolute 1.73 10*3/mm3      Monocytes, Absolute 0.66 10*3/mm3      Eosinophils, Absolute 0.19 10*3/mm3      Basophils, Absolute 0.07 10*3/mm3      Immature Grans, Absolute 0.09 10*3/mm3      nRBC 0.3 /100 WBC     POC Glucose Once [[id number]]  (Abnormal) Collected: 04/19/22 0508    Specimen: Blood Updated: 04/19/22 0524     Glucose 159 mg/dL      Comment: RN NotifiedOperator: [id number] KIMBERLYN OPALMeter ID: [id number]       POC Glucose Once [[id number]]  (Abnormal) Collected: 04/19/22 0254    Specimen: Blood Updated: 04/19/22 0310     Glucose 191 mg/dL      Comment: RN NotifiedOperator: [id number] ARLENE NATALIEMeter ID: [id number]       POC Glucose Once [[id number]]  (Abnormal) Collected: 04/19/22 0036    Specimen: Blood Updated: 04/19/22 0049     Glucose 233 mg/dL      Comment: RN NotifiedOperator: [id number] CARLTON ELIASRIJINAMeter ID: [id number]       POC Glucose Once [[id number]]  (Abnormal) Collected: 04/18/22 2257    Specimen: Blood Updated: 04/18/22 2309     Glucose 245 mg/dL      Comment: RN NotifiedOperator: [id number] ARLENE NATALIEMeter ID: [id number]       POC Glucose Once [[id number]]  (Abnormal) Collected: 04/18/22 1912    Specimen: Blood Updated: 04/18/22 1934     Glucose 204 mg/dL      Comment: RN NotifiedOperator: [id number] SOUTH LE'ANNAMeter ID: [id number]       POC Glucose Once [[id number]]  (Abnormal) Collected: 04/18/22 1702    Specimen: Blood Updated: 04/18/22 1715     Glucose 150 mg/dL      Comment: RN NotifiedOperator: [id number] SHA MORANAYLAMeter ID: [id number]           Imaging Results (All)     Procedure Component Value Units Date/Time    US Venous Doppler Lower Extremity Bilateral (duplex) [[id number]] Collected: 04/18/22 2006     Updated: 04/18/22 2055    Narrative:      EXAM: US LOWER EXTREMITY VEINS BILATERAL    HISTORY: Tachycardia with h/o A. fib, E87.70 Fluid overload,  unspecified J18.9 Pneumonia, unspecified organism    COMPARISON  STUDY:      TECHNIQUE:  Grayscale, duplex and color Doppler sonogram of the bilateral  lower extremities was obtained.     FINDINGS:    There is complete coaptation with graded compression at all  visualized levels from the common femoral vein to the calf veins.  There is augmentation of the venous waveform with calf  compression and respiratory variation seen at appropriate levels.   No filling defect is seen.   No Baker's cyst seen.     Unremarkable bilateral greater saphenous veins.      Impression:      IMPRESSION  1.  No evidence of deep venous thrombosis of the bilateral lower  extremities.       Electronically signed by:  Richmond Bernal MD  4/18/2022 8:53 PM CDT  Workstation: [id number]    XR Wrist 3+ View Left [[id number]] Collected: 04/15/22 0230     Updated: 04/15/22 1536    Narrative:      Procedure: XR WRIST 3 OR MORE VIEWS.    History: R/O FX.    Comparison: None    Findings:  Four x-rays of the left wrist were obtained. There is mild  narrowing of the radiocarpal joint. Soft tissue calcifications  along the radial artery are likely due to peripheral vascular  disease.  There is no radiographic evidence of acute fracture, dislocation  or suspicious bony abnormality.      Impression:      Impression: No radiographic evidence of acute fracture.    Electronically signed by:  Umesh Parra MD  4/15/2022 3:24 PM CDT  Workstation: [id number]    XR Chest 2 View [[id number]] Collected: 04/15/22 1347     Updated: 04/15/22 1500    Narrative:      Comparison: 3/28/2022    Indication: Shortness of air, triage protocol    Findings: PA and lateral views of the chest are obtained. There  is a tunneled right internal jugular catheter with its tip at the  cavoatrial junction. There is cardiomegaly. The mediastinum is  normal width. Prominence of central vasculature is cephalization.  Right pleural effusion. Right base airspace consolidation.  Diffuse interstitial opacities.      Impression:      Cardiomegaly. Central vascular  "congestion. Diffuse  interstitial opacities likely edema. Right base opacity  consistent with effusion and airspace consolidation.    Electronically signed by:  Froylan Bennett MD  4/15/2022 2:58 PM CDT  Workstation: [id number]           HOSPITAL COURSE:  Patient was admitted for HD. Patient had an episode of \"AMS\" per nurse, and the following day it was found that she had her gabapentin and pantoprazole prescription in her purse, in addition to Zanaflex prescription that patient \"was holding for my cousin\". During her second night, patient had couple of episodes of hyperglycemia and her insulin was adjusted. Cardiology was consulted due to abnormal EKG, and recommended to continue current regimen.   Upon discharge her Levemir was changed from 20 units  to 25 units at night and semaglutide  3 mg daily was added to her regimen. Moreover, her gabapentin was adjusted for her current kidney function, she was discharged on gabapentin 300 mg BID.     DISCHARGE CONDITION:   Stable     DISPOSITION:  Home or Self Care    DISCHARGE MEDICATIONS     Discharge Medications      New Medications      Instructions Start Date   gabapentin 300 MG capsule  Commonly known as: NEURONTIN  Replaces: gabapentin 800 MG tablet   300 mg, Oral, Every 12 Hours Scheduled      insulin detemir 100 UNIT/ML injection  Commonly known as: LEVEMIR  Replaces: insulin detemir 100 UNIT/ML injection   25 Units, Subcutaneous, Nightly      Semaglutide 3 MG tablet   3 mg, Oral, Daily         Continue These Medications      Instructions Start Date   acetaminophen 650 MG 8 hr tablet  Commonly known as: Tylenol 8 Hour   650 mg, Oral, Every 8 Hours PRN      Adult Aspirin Regimen 81 MG EC tablet  Generic drug: aspirin   81 mg, Oral, Daily      albuterol sulfate  (90 Base) MCG/ACT inhaler  Commonly known as: PROVENTIL HFA;VENTOLIN HFA;PROAIR HFA   2 puffs, Inhalation, Every 4 Hours PRN      albuterol (2.5 MG/3ML) 0.083% nebulizer solution  Commonly known as: " "PROVENTIL   Inhale the contents of 1 vial by nebulization Every 4 (Four) Hours As Needed for Wheezing.      atorvastatin 20 MG tablet  Commonly known as: LIPITOR   20 mg, Oral, Every Night at Bedtime      bumetanide 2 MG tablet  Commonly known as: BUMEX   Take 1 tablet by mouth 4 (Four) Times a Week. Take on non-hemodialysis days.      Capsaicin 0.035 % cream   3 g, Apply externally, 3 Times Daily PRN      clopidogrel 75 MG tablet  Commonly known as: PLAVIX   75 mg, Oral, Daily      [employer] Blood Glucose Meter w/Device kit   1 each, Does not apply, 3 Times Daily      cyclobenzaprine 5 MG tablet  Commonly known as: FLEXERIL   10 mg, Oral      Diclofenac Sodium 1 % gel gel  Commonly known as: VOLTAREN   4 g, Topical, 4 Times Daily PRN      DULoxetine 20 MG capsule  Commonly known as: CYMBALTA   20 mg, Oral, Daily      Easy Touch Insulin Syringe 30G X 5/16\" 0.5 ML misc  Generic drug: Insulin Syringe-Needle U-100   USE AS DIRECTED WITH LEVEMIR      Easy Touch Pen Needles 31G X 8 MM misc  Generic drug: Insulin Pen Needle   No dose, route, or frequency recorded.      NovoFine 30G X 8 MM misc  Generic drug: Insulin Pen Needle   As directed 4 times daily      B-D ULTRAFINE III SHORT PEN 31G X 8 MM misc  Generic drug: Insulin Pen Needle   Use to inject insulin 4 (Four) Times a Day as directed.      FreeStyle Jade 2 [place] device   1 each, Does not apply, Continuous      FreeStyle Jade 2 Sensor misc   1 each, Does not apply, Every 14 Days      glucose blood test strip   Use as instructed      HumaLOG KwikPen 100 UNIT/ML solution pen-injector  Generic drug: Insulin Lispro (1 Unit Dial)   12 Units, Subcutaneous, 3 Times Daily With Meals      ipratropium-albuterol 0.5-2.5 mg/3 ml nebulizer  Commonly known as: DUO-NEB   3 mL, Nebulization, 4 Times Daily - RT      isosorbide mononitrate 30 MG 24 hr tablet  Commonly known as: IMDUR   30 mg, Oral, Every Morning      levothyroxine 25 MCG tablet  Commonly known as: SYNTHROID, " LEVOTHROID   25 mcg, Daily      lidocaine 5 %  Commonly known as: LIDODERM   APPLY TO THE AFFECTED AREA(S) TOPICALLY TWO TIMES A DAY. REMOVE NIGHTLY      lisinopril 5 MG tablet  Commonly known as: PRINIVIL,ZESTRIL   5 mg, Oral, Daily      metoprolol tartrate 25 MG tablet  Commonly known as: LOPRESSOR   Take 1 tablet by mouth Every 12 (Twelve) Hours.      montelukast 10 MG tablet  Commonly known as: SINGULAIR   10 mg, Oral, Nightly      muscle rub 10-15 % cream cream   Apply 1 application topically to the appropriate area as directed 4 (Four) Times a Day As Needed for buttock pain.      nitroglycerin 0.4 MG SL tablet  Commonly known as: NITROSTAT   0.4 mg, Sublingual, Every 5 Minutes PRN      O2  Commonly known as: OXYGEN   3 L/min, Inhalation, Continuous      ondansetron ODT 4 MG disintegrating tablet  Commonly known as: Zofran ODT   4 mg, Translingual, Every 8 Hours PRN      oxybutynin XL 5 MG 24 hr tablet  Commonly known as: DITROPAN-XL   5 mg, Oral, Daily      pantoprazole 40 MG EC tablet  Commonly known as: PROTONIX   40 mg, Oral, Nightly      ranolazine 500 MG 12 hr tablet  Commonly known as: RANEXA   500 mg, Oral, Every 12 Hours Scheduled      sevelamer 800 MG tablet  Commonly known as: RENVELA   800 mg, Oral, 3 Times Daily With Meals         Stop These Medications    gabapentin 800 MG tablet  Commonly known as: NEURONTIN  Replaced by: gabapentin 300 MG capsule     insulin detemir 100 UNIT/ML injection  Commonly known as: LEVEMIR  Replaced by: insulin detemir 100 UNIT/ML injection            INSTRUCTIONS:  Activity:   Activity Instructions     Activity as Tolerated          Diet:   Diet Instructions     Diet: Consistent Carbohydrate, Cardiac      Discharge Diet:  Consistent Carbohydrate  Cardiac       Renal          FOLLOW UP:   Additional Instructions for the Follow-ups that You Need to Schedule     Call MD With Problems / Concerns   As directed      Instructions: Return to care if worsening chest pain,  weakness, shortness of breath.    Order Comments: Instructions: Return to care if worsening chest pain, weakness, shortness of breath.          Discharge Follow-up with PCP   As directed       Currently Documented PCP:    Rianna Macias MD    PCP Phone Number:    [phone]     Follow Up Details: 4/26/22         Discharge Follow-up with Specified Provider: Dialysis   As directed      To: Dialysis    Follow Up Details: Monday, Wendsday, Friday         Discharge Follow-up with Specified Provider: Dr. Davis   As directed      To: Dr. Davis    Follow Up Details: 8/26/22            Follow-up Information     Rianna Macias MD .    Specialty: Family Medicine  Why: 4/26/22  Contact information:  [address] [place], [place] [id number] [phone]             Margarito Davis MD Follow up.    Specialty: Cardiology  Contact information:  [address] [place] [address] [address]  [facility] [address] [place], [place] [id number] [phone]                         PENDING TEST RESULTS AT DISCHARGE      Time: >30 minutes were spent in discharge planning, medication reconciliation and coordination of care for this patient.    Radha Argueta MD is the attending at time of discharge, She is aware of the patient's status and agrees with the above discharge summary.    Signature  Rianna Macias. MD   Residency  [address] [facility], [place] [address]  Office: [phone]        This document has been electronically signed by Rianna Macias MD on April 21, 2022 14:05 CDT

## 2022-04-21 NOTE — OUTREACH NOTE
Call Center TCM Note    Flowsheet Row Responses   [facility] patient discharged from? [place]   Does the patient have one of the following disease processes/diagnoses(primary or secondary)? Other   TCM attempt successful? No   Unsuccessful attempts Attempt 3   Does the patient have a primary care provider?  Yes   Comments regarding PCP HOSP DC FU appt 4/26/22 @ 3pm.           Marivel Barrera RN    4/21/2022, 15:55 EDT

## 2022-04-26 ENCOUNTER — OFFICE VISIT (OUTPATIENT)
Dept: FAMILY MEDICINE CLINIC | Facility: CLINIC | Age: 62
End: 2022-04-26

## 2022-04-26 VITALS
DIASTOLIC BLOOD PRESSURE: 84 MMHG | BODY MASS INDEX: 53.92 KG/M2 | WEIGHT: 293 LBS | HEART RATE: 69 BPM | OXYGEN SATURATION: 98 % | HEIGHT: 62 IN | SYSTOLIC BLOOD PRESSURE: 150 MMHG

## 2022-04-26 DIAGNOSIS — E78.2 MIXED HYPERLIPIDEMIA: ICD-10-CM

## 2022-04-26 DIAGNOSIS — Z09 HOSPITAL DISCHARGE FOLLOW-UP: Primary | ICD-10-CM

## 2022-04-26 DIAGNOSIS — E11.43 TYPE 2 DIABETES MELLITUS WITH DIABETIC AUTONOMIC NEUROPATHY, WITH LONG-TERM CURRENT USE OF INSULIN: ICD-10-CM

## 2022-04-26 DIAGNOSIS — M54.2 NECK PAIN: ICD-10-CM

## 2022-04-26 DIAGNOSIS — I73.9 PAD (PERIPHERAL ARTERY DISEASE): ICD-10-CM

## 2022-04-26 DIAGNOSIS — I10 PRIMARY HYPERTENSION: ICD-10-CM

## 2022-04-26 DIAGNOSIS — K21.9 GASTROESOPHAGEAL REFLUX DISEASE, UNSPECIFIED WHETHER ESOPHAGITIS PRESENT: ICD-10-CM

## 2022-04-26 DIAGNOSIS — E11.40 TYPE 2 DIABETES MELLITUS WITH DIABETIC NEUROPATHY, UNSPECIFIED WHETHER LONG TERM INSULIN USE: ICD-10-CM

## 2022-04-26 DIAGNOSIS — Z79.4 TYPE 2 DIABETES MELLITUS WITH DIABETIC AUTONOMIC NEUROPATHY, WITH LONG-TERM CURRENT USE OF INSULIN: ICD-10-CM

## 2022-04-26 DIAGNOSIS — F17.200 TOBACCO DEPENDENCE SYNDROME: ICD-10-CM

## 2022-04-26 DIAGNOSIS — J44.0 CHRONIC OBSTRUCTIVE PULMONARY DISEASE WITH ACUTE LOWER RESPIRATORY INFECTION: ICD-10-CM

## 2022-04-26 DIAGNOSIS — I25.10 CORONARY ARTERY DISEASE INVOLVING NATIVE CORONARY ARTERY OF NATIVE HEART WITHOUT ANGINA PECTORIS: ICD-10-CM

## 2022-04-26 PROCEDURE — 1111F DSCHRG MED/CURRENT MED MERGE: CPT | Performed by: STUDENT IN AN ORGANIZED HEALTH CARE EDUCATION/TRAINING PROGRAM

## 2022-04-26 PROCEDURE — 99496 TRANSJ CARE MGMT HIGH F2F 7D: CPT | Performed by: STUDENT IN AN ORGANIZED HEALTH CARE EDUCATION/TRAINING PROGRAM

## 2022-04-26 RX ORDER — PANTOPRAZOLE SODIUM 40 MG/1
40 TABLET, DELAYED RELEASE ORAL NIGHTLY
Qty: 90 TABLET | Refills: 0 | Status: SHIPPED | OUTPATIENT
Start: 2022-04-26

## 2022-04-26 RX ORDER — DULOXETIN HYDROCHLORIDE 20 MG/1
40 CAPSULE, DELAYED RELEASE ORAL DAILY
Qty: 180 CAPSULE | Refills: 0 | Status: SHIPPED | OUTPATIENT
Start: 2022-04-26 | End: 2022-12-14

## 2022-04-26 RX ORDER — GABAPENTIN 300 MG/1
300 CAPSULE ORAL DAILY
Qty: 45 CAPSULE | Refills: 2
Start: 2022-04-26 | End: 2022-04-26 | Stop reason: SDUPTHER

## 2022-04-26 RX ORDER — ASPIRIN 81 MG/1
1 TABLET, CHEWABLE ORAL DAILY
COMMUNITY
Start: 2021-11-04 | End: 2022-04-26

## 2022-04-26 RX ORDER — ORAL SEMAGLUTIDE 7 MG/1
7 TABLET ORAL DAILY
Qty: 90 TABLET | Refills: 0 | Status: SHIPPED | OUTPATIENT
Start: 2022-05-19 | End: 2022-10-06

## 2022-04-26 RX ORDER — GABAPENTIN 800 MG/1
TABLET ORAL
COMMUNITY
Start: 2022-04-25 | End: 2022-04-26

## 2022-04-26 RX ORDER — NITROGLYCERIN 0.4 MG/1
1 TABLET SUBLINGUAL
COMMUNITY
Start: 2021-11-04 | End: 2022-04-26 | Stop reason: SDUPTHER

## 2022-04-26 RX ORDER — GABAPENTIN 300 MG/1
300 CAPSULE ORAL DAILY
Qty: 45 CAPSULE | Refills: 2 | Status: SHIPPED | OUTPATIENT
Start: 2022-04-26 | End: 2022-08-22

## 2022-04-26 RX ORDER — ATORVASTATIN CALCIUM 20 MG/1
20 TABLET, FILM COATED ORAL
Qty: 90 TABLET | Refills: 0 | Status: ON HOLD | OUTPATIENT
Start: 2022-04-26 | End: 2023-03-15

## 2022-04-26 RX ORDER — GABAPENTIN 300 MG/1
300 CAPSULE ORAL EVERY 12 HOURS SCHEDULED
Qty: 60 CAPSULE | Refills: 0 | Status: CANCELLED
Start: 2022-04-26

## 2022-04-26 RX ORDER — GABAPENTIN 800 MG/1
TABLET ORAL
Status: CANCELLED | OUTPATIENT
Start: 2022-04-26

## 2022-04-26 RX ORDER — SODIUM BICARBONATE 650 MG/1
1 TABLET ORAL
COMMUNITY
Start: 2021-11-04 | End: 2022-12-13

## 2022-04-26 RX ORDER — OXYBUTYNIN CHLORIDE 5 MG/1
5 TABLET, EXTENDED RELEASE ORAL DAILY
Qty: 90 TABLET | Refills: 0 | Status: SHIPPED | OUTPATIENT
Start: 2022-04-26 | End: 2022-12-13

## 2022-04-26 RX ORDER — LISINOPRIL 20 MG/1
20 TABLET ORAL DAILY
Qty: 90 TABLET | Refills: 0 | Status: SHIPPED | OUTPATIENT
Start: 2022-04-26 | End: 2022-12-13

## 2022-04-26 RX ORDER — RANOLAZINE 500 MG/1
500 TABLET, EXTENDED RELEASE ORAL EVERY 12 HOURS SCHEDULED
Qty: 180 TABLET | Refills: 0 | Status: SHIPPED | OUTPATIENT
Start: 2022-04-26 | End: 2022-09-13 | Stop reason: SDUPTHER

## 2022-04-26 RX ORDER — PROMETHAZINE HYDROCHLORIDE 25 MG/1
SUPPOSITORY RECTAL EVERY 6 HOURS
COMMUNITY
Start: 2021-11-04 | End: 2022-09-10 | Stop reason: HOSPADM

## 2022-04-26 RX ORDER — MONTELUKAST SODIUM 10 MG/1
10 TABLET ORAL NIGHTLY
Qty: 90 TABLET | Refills: 0 | Status: ON HOLD | OUTPATIENT
Start: 2022-04-26 | End: 2023-03-15

## 2022-04-26 RX ORDER — LISINOPRIL 20 MG/1
20 TABLET ORAL DAILY
COMMUNITY
Start: 2022-04-08 | End: 2022-04-26 | Stop reason: SDUPTHER

## 2022-04-26 RX ORDER — CYCLOBENZAPRINE HCL 5 MG
10 TABLET ORAL NIGHTLY PRN
Qty: 90 TABLET | Refills: 0 | Status: SHIPPED | OUTPATIENT
Start: 2022-04-26 | End: 2022-07-07 | Stop reason: HOSPADM

## 2022-04-26 NOTE — PROGRESS NOTES
Family Medicine Residency  Rianna Macias MD    Subjective:     Yamileth Slater is a 62 y.o. female who presents for hospitalization follow up after being admitted for dialysis. Patient does not have any concerns today. She states that she has been to dialysis on scheduled days after discharged.     The following portions of the patient's history were reviewed and updated as appropriate: allergies, current medications, past family history, past medical history, past social history, past surgical history and problem list.    Past Medical Hx:  Past Medical History:   Diagnosis Date   • Acute blood loss anemia 4/16/2017    Likely due to gastric oozing at this time. - Dr. Duarte (GI) was consulted and has now signed off, will follow up outpatient - pill colonoscopy showed AVMs - continue to monitor   • Acute cystitis with hematuria 3/31/2021    1/13: IV Rocephin 1 gm q 24 1/14 : transitioned  to omnicef 300mg. Urine cultures resulted and did not show growth. Omnicef discontinued as patient is asymptomatic   • Altered mental status 1/9/2022    - AMS on presentation - initial ABG pH 7.3, CO2 34 - Procal 0.29 - UA negative for acute cysitits -CTA head wnl  - Empiric Zosyn and Vancomycin -Lactate 2.5 on admission  - blood cultures no growth at 24 hours     • Anxiety    • CAD (coronary artery disease) 4/24/2021    S/P 3 stents 5/1/2021 for [facility] Continue ASA 81mg & Clopidogrel 75mg Continue Atorvastatin 40mg   • Carotid artery stenosis    • Chronic obstructive lung disease (HCC)    • CKD (chronic kidney disease) stage 4, GFR 15-29 ml/min ([facility])    • CKD (chronic kidney disease), symptom management only, stage 5 ([facility]) 10/5/2020    Results from last 7 days Lab Units 12/15/21 0548 12/14/21 1323 12/14/21 0916 CREATININE mg/dL 3.92* 3.21* 3.32*  Baseline creatinine 2-3 GFR 13-25 GFR 15 Dialysis MWF, sees Dr. Lauren Nephrology consult,, appreciate recommendations Continue Bumex 1mg bid daily Holding Bumex 2mg 4 times a week    • Colonic polyp    • Coronary arteriosclerosis    • Diabetes mellitus (HCC)    • Diabetic neuropathy ([facility])    • Ear pain, right 10/18/2021    - canal trauma due to patient scratching and DMT2 - added cortisporin ear drops   • Elevated troponin 10/12/2021    -most likely from CKD -Trending down -Neg chest pain   • GERD (gastroesophageal reflux disease)    • GI bleed 5/13/2021    - GI will follow up outpatient - Protonix 40mg daily - Avoid medical DVT prophy and use mechanical at this time instead. - Continue to monitor - pill colonoscopy results showed AVMs   • History of transfusion    • Hypercholesterolemia    • Hypertension    • Hypomagnesemia 6/27/2021    Monitor and replace   • Morbid obesity ([facility])    • Nephrolithiasis    • Peripheral vascular disease ([facility])    • SIRS (systemic inflammatory response syndrome) ([facility]) 1/9/2022    Admission  - WBC 17.78   -   - RR 16 - 1/10: VSS/wnl - CXR - Mild pulm edema - Blood cultures no growth at 24 hours  - Procalcitonin 0.29 - UA : glucose 1000, negative Leucocytes/nitrate - Empiric Zosyn and Vancomcyin    • Sleep apnea    • Substance abuse ([facility])    • Vitamin D deficiency        Past Surgical Hx:  Past Surgical History:   Procedure Laterality Date   • CARDIAC CATHETERIZATION N/A 7/14/2020   • CARDIAC CATHETERIZATION N/A 4/23/2021    Procedure: Left Heart Cath;  Surgeon: Melba Romo MD;  Location: [facility] CATH INVASIVE LOCATION;  Service: Cardiology;  Laterality: N/A;   • CARDIAC CATHETERIZATION N/A 4/30/2021    Procedure: Percutaneous Coronary Intervention;  Surgeon: Russell Voss MD;  Location: [facility] CATH INVASIVE LOCATION;  Service: Cardiovascular;  Laterality: N/A;   • CARDIAC CATHETERIZATION N/A 4/30/2021    Procedure: Stent NIKKI coronary;  Surgeon: Russell Voss MD;  Location: [facility] CATH INVASIVE LOCATION;  Service: Cardiovascular;  Laterality: N/A;   • CARDIAC CATHETERIZATION Left 11/13/2021    Procedure: Left Heart Cath;  Surgeon:  Niall Rios MD;  Location: [facility] CATH INVASIVE LOCATION;  Service: Cardiology;  Laterality: Left;   • CAROTID STENT Left    • COLONOSCOPY     • COLONOSCOPY N/A 5/14/2021    Procedure: COLONOSCOPY;  Surgeon: Mingo Duarte MD;  Location: [facility] ENDOSCOPY;  Service: Gastroenterology;  Laterality: N/A;   • CORONARY ARTERY BYPASS GRAFT N/A 2013    CABG X 3   • CYSTOSCOPY BLADDER STONE LITHOTRIPSY Bilateral    • ENDOSCOPY N/A 4/12/2021    Procedure: ESOPHAGOGASTRODUODENOSCOPY;  Surgeon: Mingo Duarte MD;  Location: [facility] ENDOSCOPY;  Service: Gastroenterology;  Laterality: N/A;   • ENDOSCOPY N/A 5/14/2021    Procedure: ESOPHAGOGASTRODUODENOSCOPY;  Surgeon: Mingo Duarte MD;  Location: [facility] ENDOSCOPY;  Service: Gastroenterology;  Laterality: N/A;   • ENDOSCOPY N/A 1/28/2022    Procedure: ESOPHAGOGASTRODUODENOSCOPY;  Surgeon: Mingo Duarte MD;  Location: [facility] ENDOSCOPY;  Service: Gastroenterology;  Laterality: N/A;   • INTERVENTIONAL RADIOLOGY PROCEDURE N/A 10/21/2021    Procedure: tunneled central venous catheter placement;  Surgeon: Donnie Robles MD;  Location: [facility] ANGIO INVASIVE LOCATION;  Service: Interventional Radiology;  Laterality: N/A;   • INTERVENTIONAL RADIOLOGY PROCEDURE N/A 1/27/2022    Procedure: tunneled central venous catheter placement;  Surgeon: Donnie Robles MD;  Location: [facility] ANGIO INVASIVE LOCATION;  Service: Interventional Radiology;  Laterality: N/A;       Current Meds:    Current Outpatient Medications:   •  acetaminophen (Tylenol 8 Hour) 650 MG 8 hr tablet, Take 1 tablet by mouth Every 8 (Eight) Hours As Needed for Mild Pain ., Disp: 90 tablet, Rfl: 0  •  albuterol (PROVENTIL) (2.5 MG/3ML) 0.083% nebulizer solution, Inhale the contents of 1 vial by nebulization Every 4 (Four) Hours As Needed for Wheezing., Disp: 75 mL, Rfl: 3  •  albuterol sulfate  (90 Base) MCG/ACT inhaler, Inhale 2 puffs Every 4 (Four) Hours As Needed for Wheezing.,  "Disp: 18 g, Rfl: 1  •  aspirin (aspirin) 81 MG EC tablet, Take 1 tablet by mouth Daily., Disp: 60 tablet, Rfl: 5  •  atorvastatin (LIPITOR) 20 MG tablet, Take 1 tablet by mouth every night at bedtime., Disp: 90 tablet, Rfl: 0  •  Blood Glucose Monitoring Suppl ([employer] Blood Glucose Meter) w/Device kit, 1 each 3 (Three) Times a Day., Disp: 1 kit, Rfl: 3  •  bumetanide (BUMEX) 2 MG tablet, Take 1 tablet by mouth 4 (Four) Times a Week. Take on non-hemodialysis days., Disp: 16 tablet, Rfl: 0  •  Capsaicin 0.035 % cream, Apply 3 g topically 3 (Three) Times a Day As Needed (ankle pain)., Disp: 42.5 g, Rfl: 2  •  Cetirizine HCl 10 MG capsule, Take 1 capsule by mouth Daily., Disp: , Rfl:   •  clopidogrel (PLAVIX) 75 MG tablet, Take 1 tablet by mouth Daily., Disp: 30 tablet, Rfl: 5  •  Continuous Blood Gluc  (FreeStyle Jade 2 Rising Star) device, 1 each Continuous., Disp: 1 each, Rfl: 0  •  Continuous Blood Gluc Sensor (FreeStyle Jade 2 Sensor) misc, 1 each Every 14 (Fourteen) Days., Disp: 2 each, Rfl: 11  •  cyclobenzaprine (FLEXERIL) 5 MG tablet, Take 2 tablets by mouth At Night As Needed for Muscle Spasms., Disp: 90 tablet, Rfl: 0  •  Diclofenac Sodium (VOLTAREN) 1 % gel gel, Apply 4 g topically to the appropriate area as directed 4 (Four) Times a Day As Needed (Ankle pain)., Disp: 350 g, Rfl: 2  •  DULoxetine (CYMBALTA) 20 MG capsule, Take 2 capsules by mouth Daily for 90 days., Disp: 180 capsule, Rfl: 0  •  Easy Touch Insulin Syringe 30G X 5/16\" 0.5 ML misc, USE AS DIRECTED WITH RUCHI, Disp: , Rfl:   •  EASY TOUCH PEN NEEDLES 31G X 8 MM misc, , Disp: , Rfl:   •  gabapentin (NEURONTIN) 300 MG capsule, Take 1 capsule by mouth Daily. And an extra pill M/W/F - dialysis days, Disp: 45 capsule, Rfl: 2  •  glucose blood test strip, Use as instructed, Disp: 100 each, Rfl: 12  •  insulin detemir (LEVEMIR) 100 UNIT/ML injection, Inject 25 Units under the skin into the appropriate area as directed Every Night., Disp: 3 mL, " Rfl: 12  •  Insulin Lispro, 1 Unit Dial, (HUMALOG) 100 UNIT/ML solution pen-injector, Inject 12 Units under the skin into the appropriate area as directed 3 (Three) Times a Day With Meals., Disp: 30 mL, Rfl: 12  •  Insulin Pen Needle (NovoFine) 30G X 8 MM misc, As directed 4 times daily, Disp: 100 each, Rfl: 11  •  Insulin Pen Needle 31G X 8 MM misc, Use to inject insulin 4 (Four) Times a Day as directed., Disp: 120 each, Rfl: 12  •  ipratropium-albuterol (DUO-NEB) 0.5-2.5 mg/3 ml nebulizer, Take 3 mL by nebulization 4 (Four) Times a Day., Disp: , Rfl:   •  isosorbide mononitrate (IMDUR) 30 MG 24 hr tablet, Take 1 tablet by mouth Every Morning., Disp: 90 tablet, Rfl: 1  •  levothyroxine (SYNTHROID, LEVOTHROID) 25 MCG tablet, Take 25 mcg by mouth Daily., Disp: , Rfl:   •  lidocaine (LIDODERM) 5 %, APPLY TO THE AFFECTED AREA(S) TOPICALLY TWO TIMES A DAY. REMOVE NIGHTLY, Disp: 30 patch, Rfl: 1  •  lisinopril (PRINIVIL,ZESTRIL) 20 MG tablet, Take 1 tablet by mouth Daily., Disp: 90 tablet, Rfl: 0  •  Methoxy PEG-Epoetin Beta (MIRCERA IJ), 150 mcg Every 14 (Fourteen) Days., Disp: , Rfl:   •  Methoxy PEG-Epoetin Beta (MIRCERA IJ), 225 mcg Every 14 (Fourteen) Days., Disp: , Rfl:   •  metoprolol tartrate (LOPRESSOR) 25 MG tablet, Take 1 tablet by mouth Every 12 (Twelve) Hours., Disp: 180 tablet, Rfl: 0  •  montelukast (SINGULAIR) 10 MG tablet, Take 1 tablet by mouth Every Night., Disp: 90 tablet, Rfl: 0  •  muscle rub (BenGay) 10-15 % cream cream, Apply 1 application topically to the appropriate area as directed 4 (Four) Times a Day As Needed for buttock pain., Disp: 85 g, Rfl: 1  •  nitroglycerin (NITROSTAT) 0.4 MG SL tablet, Place 0.4 mg under the tongue Every 5 (Five) Minutes As Needed for Chest Pain (x 3 doses)., Disp: , Rfl:   •  O2 (OXYGEN), Inhale 3 L/min Continuous., Disp: , Rfl:   •  ondansetron ODT (Zofran ODT) 4 MG disintegrating tablet, Place 1 tablet on the tongue Every 8 (Eight) Hours As Needed for Nausea or  Vomiting., Disp: 30 tablet, Rfl: 0  •  oxybutynin XL (DITROPAN-XL) 5 MG 24 hr tablet, Take 1 tablet by mouth Daily., Disp: 90 tablet, Rfl: 0  •  pantoprazole (PROTONIX) 40 MG EC tablet, Take 1 tablet by mouth Every Night., Disp: 90 tablet, Rfl: 0  •  promethazine (PHENERGAN) 25 MG suppository, Insert  into the rectum Every 6 (Six) Hours., Disp: , Rfl:   •  ranolazine (RANEXA) 500 MG 12 hr tablet, Take 1 tablet by mouth Every 12 (Twelve) Hours., Disp: 180 tablet, Rfl: 0  •  sevelamer (RENVELA) 800 MG tablet, Take 800 mg by mouth 3 (Three) Times a Day With Meals., Disp: , Rfl:   •  sodium bicarbonate 650 MG tablet, Take 1 tablet by mouth., Disp: , Rfl:   •  [START ON 5/19/2022] Semaglutide (Rybelsus) 7 MG tablet, Take 7 mg by mouth Daily., Disp: 90 tablet, Rfl: 0    Allergies:  Allergies   Allergen Reactions   • Adhesive Tape Hives and Other (See Comments)   • Latex Other (See Comments)   • Nsaids Hives   • Other      Bandaids, MRSA, SURECLOSE       Family Hx:  Family History   Problem Relation Age of Onset   • Heart disease Mother    • Lung cancer Mother    • Heart disease Father    • Heart attack Father    • Diabetes Father    • Heart disease Half-Sister         Dad's side   • Heart disease Brother    • No Known Problems Sister    • No Known Problems Sister    • No Known Problems Sister    • No Known Problems Sister    • No Known Problems Sister    • Pancreatic cancer Half-Sister         Dad's side   • No Known Problems Brother    • No Known Problems Brother    • Heart attack Half-Brother    • Heart attack Half-Brother    • No Known Problems Maternal Grandmother    • No Known Problems Maternal Grandfather    • No Known Problems Paternal Grandmother    • No Known Problems Paternal Grandfather         Social History:  Social History     Socioeconomic History   • Marital status:      Spouse name: wesley dumont   Tobacco Use   • Smoking status: Former Smoker     Packs/day: 0.25     Years: 46.00     Pack years:  "11.50     Types: Cigarettes     Start date: 1999     Quit date: 2/15/2022     Years since quittin.1   • Smokeless tobacco: Never Used   • Tobacco comment: only smoking 5 a day - quit 2021   Substance and Sexual Activity   • Alcohol use: No   • Drug use: Not Currently     Types: LSD, Marijuana, Methamphetamines   • Sexual activity: Not Currently       Review of Systems  Review of Systems   Constitutional: Negative for appetite change, fatigue and fever.   Eyes: Negative for visual disturbance.   Respiratory: Negative for cough, shortness of breath and wheezing.         2 L NC   Cardiovascular: Negative for chest pain, palpitations and leg swelling.   Gastrointestinal: Negative for abdominal pain, constipation, diarrhea, nausea and vomiting.   Genitourinary: Negative for dysuria.   Musculoskeletal: Positive for myalgias. Negative for arthralgias and gait problem.   Skin: Positive for wound.   Neurological: Negative for dizziness, facial asymmetry and headaches.   Psychiatric/Behavioral: Negative for behavioral problems.       Objective:     /84   Pulse 69   Ht 157.5 cm (62\")   Wt 134 kg (295 lb 11.2 oz)   LMP  (LMP Unknown)   SpO2 98%   BMI 54.08 kg/m²   Physical Exam  Vitals reviewed.   Constitutional:       General: She is not in acute distress.     Appearance: She is obese. She is not ill-appearing or toxic-appearing.   HENT:      Head: Normocephalic and atraumatic.      Nose: Nose normal.      Mouth/Throat:      Pharynx: Oropharynx is clear.   Eyes:      Conjunctiva/sclera: Conjunctivae normal.   Cardiovascular:      Rate and Rhythm: Normal rate and regular rhythm.   Pulmonary:      Effort: Pulmonary effort is normal. No respiratory distress.      Breath sounds: No wheezing.      Comments: On 2 L NC  Abdominal:      Palpations: Abdomen is soft.      Tenderness: There is no abdominal tenderness. There is no guarding.   Skin:     General: Skin is warm and dry.          Neurological:      " Mental Status: She is alert and oriented to person, place, and time.   Psychiatric:         Mood and Affect: Mood normal.          Assessment/Plan:     Diagnoses and all orders for this visit:    1. Hospital discharge follow-up (Primary)   Extensive discussion with patient regarding her missing her dialysis. Risk of missing her dialysis session explained to patient. Patient verbalized understanding and stated that she will try her best to attend to all her dialysis sessions.     2. Type 2 diabetes mellitus with diabetic neuropathy, unspecified whether long term insulin use (HCC)  -     DULoxetine (CYMBALTA) 20 MG capsule; Take 2 capsules by mouth Daily for 90 days.  Dispense: 180 capsule; Refill: 0  -     Semaglutide (Rybelsus) 7 MG tablet; Take 7 mg by mouth Daily.  Dispense: 90 tablet; Refill: 0  -     Ambulatory Referral to Podiatry  -     Diclofenac Sodium (VOLTAREN) 1 % gel gel; Apply 4 g topically to the appropriate area as directed 4 (Four) Times a Day As Needed (Ankle pain).  Dispense: 350 g; Refill: 2  -     Capsaicin 0.035 % cream; Apply 3 g topically 3 (Three) Times a Day As Needed (ankle pain).  Dispense: 42.5 g; Refill: 2  -     Discussed with patient that her gabapentin dosage has change due to her dialysis status. From now on, she will take 300 mg daily and 300 mg extra on the days she has dialysis. Her Cymbalta was increased from 20 mg to 40 mg to have a better control of her neuropathic pain. She was also referred to podiatry due to her DM.    3. Type 2 diabetes mellitus with diabetic autonomic neuropathy, with long-term current use of insulin (HCC)  -     Discontinue: gabapentin (NEURONTIN) 300 MG capsule; Take 1 capsule by mouth Daily. And an extra pill M/W/F - dialysis days  Dispense: 45 capsule; Refill: 2  -     gabapentin (NEURONTIN) 300 MG capsule; Take 1 capsule by mouth Daily. And an extra pill M/W/F - dialysis days  Dispense: 45 capsule; Refill: 2    4. Chronic obstructive pulmonary disease  with acute lower respiratory infection (HCC)  -     montelukast (SINGULAIR) 10 MG tablet; Take 1 tablet by mouth Every Night.  Dispense: 90 tablet; Refill: 0  -     Patient uses trilogy machine at home. She is compliant to use it at night, and also brought it to the hospital while hospitalized.    5. Mixed hyperlipidemia  -     atorvastatin (LIPITOR) 20 MG tablet; Take 1 tablet by mouth every night at bedtime.  Dispense: 90 tablet; Refill: 0    6. Gastroesophageal reflux disease, unspecified whether esophagitis present  -     pantoprazole (PROTONIX) 40 MG EC tablet; Take 1 tablet by mouth Every Night.  Dispense: 90 tablet; Refill: 0    7. Primary hypertension  -     lisinopril (PRINIVIL,ZESTRIL) 20 MG tablet; Take 1 tablet by mouth Daily.  Dispense: 90 tablet; Refill: 0  -     metoprolol tartrate (LOPRESSOR) 25 MG tablet; Take 1 tablet by mouth Every 12 (Twelve) Hours.  Dispense: 180 tablet; Refill: 0    8. PAD (peripheral artery disease) (HCC)    9. Coronary artery disease involving native coronary artery of native heart without angina pectoris  -     ranolazine (RANEXA) 500 MG 12 hr tablet; Take 1 tablet by mouth Every 12 (Twelve) Hours.  Dispense: 180 tablet; Refill: 0    10. Tobacco dependence syndrome  Patient states that she is not ready to quit. Discussed with her that she can contact us anytime she decides to quit. Risk of smoking cigarretes discussed with patient.    11. Neck pain  -     cyclobenzaprine (FLEXERIL) 5 MG tablet; Take 2 tablets by mouth At Night As Needed for Muscle Spasms.  Dispense: 90 tablet; Refill: 0    Other orders  -     oxybutynin XL (DITROPAN-XL) 5 MG 24 hr tablet; Take 1 tablet by mouth Daily.  Dispense: 90 tablet; Refill: 0    All medications requested were prescribed for 3 months. She will receive her gabapentin in a monthly base. She will not get extra gabapentin if she finishes her prescription before her next refill. Patient verbalized understanding.     · Rx changes: Duloxetine  increased to 40 mg daily.   · Patient Education: Extensive discussion with patient regarding her compliance with dialysis. Discussed with patient the importance to attend all her dialysis appointments and how it can affect her in the long run missing them. Patient verbalized understanding.   · Compliance at present is estimated to be good.   · Efforts to improve compliance (if necessary) will be directed at increased exercise.  ·   Follow-up:     Return in about 3 months (around 7/26/2022).    Preventative:  Health Maintenance   Topic Date Due   • COVID-19 Vaccine (1) Never done   • ZOSTER VACCINE (1 of 2) Never done   • ANNUAL WELLNESS VISIT  Never done   • DIABETIC EYE EXAM  10/09/2019   • DIABETIC FOOT EXAM  12/26/2019   • PAP SMEAR  01/04/2020   • MAMMOGRAM  01/10/2022   • LIPID PANEL  06/02/2022   • INFLUENZA VACCINE  08/01/2022   • HEMOGLOBIN A1C  08/01/2022   • URINE MICROALBUMIN  10/19/2022   • TDAP/TD VACCINES (3 - Td or Tdap) 11/10/2024   • COLORECTAL CANCER SCREENING  05/14/2026   • Pneumococcal Vaccine 0-64 (4 - PPSV23 or PCV20) 02/18/2027   • HEPATITIS C SCREENING  Completed   • Hepatitis B  Aged Out     Female Preventative: Will discuss during next visit  Recommended: none  Vaccine Counseling: N/A      Alcohol use:  reports no history of alcohol use.  Nicotine status  reports that she quit smoking about 2 months ago. Her smoking use included cigarettes. She started smoking about 23 years ago. She has a 11.50 pack-year smoking history. She has never used smokeless tobacco.     Goals     • Fasting Blood Glucose       Barriers: compliance with diet        • Quit smoking / using tobacco           RISK SCORE: 4    Signature  [place] [address]  Office: [phone]        This document has been electronically signed by Rianna Macias MD on April 28, 2022 14:58 CDT

## 2022-04-27 ENCOUNTER — READMISSION MANAGEMENT (OUTPATIENT)
Dept: CALL CENTER | Facility: HOSPITAL | Age: 62
End: 2022-04-27

## 2022-04-27 NOTE — OUTREACH NOTE
Medical Week 2 Survey    Flowsheet Row Responses   [facility] facility patient discharged from? [place]   Does the patient have one of the following disease processes/diagnoses(primary or secondary)? Other   Week 2 attempt successful? No   Unsuccessful attempts Attempt 2          ROBB LAYTON - Registered Nurse

## 2022-05-05 NOTE — PLAN OF CARE
Goal Outcome Evaluation:  Plan of Care Reviewed With: patient        Progress: improving  Outcome Summary: VVS. Pain controlled with PRN dilaudid. No s/s of acute distress noted. Will continue to monitor.   Patient transferred to [facility]. Patient is drowsy from sedation but responsive and following commands. Distal pulses weakly audible by doppler, same as preprocedure. Daughter updated.

## 2022-05-06 ENCOUNTER — READMISSION MANAGEMENT (OUTPATIENT)
Dept: CALL CENTER | Facility: HOSPITAL | Age: 62
End: 2022-05-06

## 2022-05-06 NOTE — OUTREACH NOTE
Medical Week 3 Survey    Flowsheet Row Responses   [facility] patient discharged from? [place]   Does the patient have one of the following disease processes/diagnoses(primary or secondary)? Other   Week 3 attempt successful? No   Unsuccessful attempts Attempt 1   Call end time 1352   Is the patient interested in additional calls from an ambulatory ?  NOTE:  applies to high risk patients requiring additional follow-up. No   Revoked No further contact(revokes)-requires comment   Graduated/Revoked comments UTR  4          DORY HERNANDEZ - Registered Nurse

## 2022-05-18 DIAGNOSIS — E11.69 DIABETES MELLITUS TYPE 2 IN OBESE: ICD-10-CM

## 2022-05-18 DIAGNOSIS — E66.9 DIABETES MELLITUS TYPE 2 IN OBESE: ICD-10-CM

## 2022-05-18 RX ORDER — EMPAGLIFLOZIN 25 MG/1
TABLET, FILM COATED ORAL DAILY
Qty: 30 TABLET | Refills: 5 | OUTPATIENT
Start: 2022-05-18

## 2022-05-24 RX ORDER — INSULIN LISPRO 100 [IU]/ML
12 INJECTION, SOLUTION INTRAVENOUS; SUBCUTANEOUS
Qty: 30 ML | Refills: 12 | Status: ON HOLD | OUTPATIENT
Start: 2022-05-24 | End: 2022-12-23 | Stop reason: SDUPTHER

## 2022-05-24 NOTE — TELEPHONE ENCOUNTER
Incoming Refill Request      Medication requested (name and dose):   Insulin Lispro, 1 Unit Dial, (HUMALOG) 100 UNIT/ML solution pen-injector    Pharmacy where request should be sent: [facility]    Additional details provided by patient:     Best call back number: [phone]    Does the patient have less than a 3 day supply:  [] Yes  [x] No    Nori Hooker  05/24/22, 09:05 CDT

## 2022-05-27 ENCOUNTER — APPOINTMENT (OUTPATIENT)
Dept: CT IMAGING | Facility: HOSPITAL | Age: 62
End: 2022-05-27

## 2022-05-27 ENCOUNTER — HOSPITAL ENCOUNTER (OUTPATIENT)
Facility: HOSPITAL | Age: 62
Setting detail: OBSERVATION
Discharge: HOME | End: 2022-05-28
Attending: EMERGENCY MEDICINE | Admitting: FAMILY MEDICINE

## 2022-05-27 DIAGNOSIS — N18.6 CHRONIC KIDNEY DISEASE WITH END STAGE RENAL FAILURE ON DIALYSIS: ICD-10-CM

## 2022-05-27 DIAGNOSIS — Z99.2 ANEMIA DUE TO CHRONIC KIDNEY DISEASE, ON CHRONIC DIALYSIS: Primary | ICD-10-CM

## 2022-05-27 DIAGNOSIS — D63.1 ANEMIA DUE TO CHRONIC KIDNEY DISEASE, ON CHRONIC DIALYSIS: Primary | ICD-10-CM

## 2022-05-27 DIAGNOSIS — N18.6 ANEMIA DUE TO CHRONIC KIDNEY DISEASE, ON CHRONIC DIALYSIS: Primary | ICD-10-CM

## 2022-05-27 DIAGNOSIS — Z99.2 CHRONIC KIDNEY DISEASE WITH END STAGE RENAL FAILURE ON DIALYSIS: ICD-10-CM

## 2022-05-27 PROBLEM — Z29.9 DVT PROPHYLAXIS: Status: ACTIVE | Noted: 2022-03-28

## 2022-05-27 PROBLEM — E87.6 HYPOKALEMIA: Status: ACTIVE | Noted: 2022-05-27

## 2022-05-27 PROBLEM — Z79.899 DVT PROPHYLAXIS: Status: ACTIVE | Noted: 2022-03-28

## 2022-05-27 PROBLEM — I16.0 HYPERTENSIVE URGENCY: Status: ACTIVE | Noted: 2022-05-27

## 2022-05-27 LAB
ABO GROUP BLD: NORMAL
ALBUMIN SERPL-MCNC: 3.5 G/DL (ref 3.5–5.2)
ALBUMIN/GLOB SERPL: 0.8 G/DL
ALP SERPL-CCNC: 245 U/L (ref 39–117)
ALT SERPL W P-5'-P-CCNC: 6 U/L (ref 1–33)
ANION GAP SERPL CALCULATED.3IONS-SCNC: 19 MMOL/L (ref 5–15)
ANISOCYTOSIS BLD QL: ABNORMAL
AST SERPL-CCNC: 7 U/L (ref 1–32)
BILIRUB SERPL-MCNC: 0.3 MG/DL (ref 0–1.2)
BLD GP AB SCN SERPL QL: NEGATIVE
BUN SERPL-MCNC: 50 MG/DL (ref 8–23)
BUN/CREAT SERPL: 14.3 (ref 7–25)
CALCIUM SPEC-SCNC: 9.5 MG/DL (ref 8.6–10.5)
CHLORIDE SERPL-SCNC: 86 MMOL/L (ref 98–107)
CO2 SERPL-SCNC: 25 MMOL/L (ref 22–29)
CREAT SERPL-MCNC: 3.5 MG/DL (ref 0.57–1)
DEPRECATED RDW RBC AUTO: 51.1 FL (ref 37–54)
EGFRCR SERPLBLD CKD-EPI 2021: 14.1 ML/MIN/1.73
EOSINOPHIL # BLD MANUAL: 0.13 10*3/MM3 (ref 0–0.4)
EOSINOPHIL NFR BLD MANUAL: 1 % (ref 0.3–6.2)
ERYTHROCYTE [DISTWIDTH] IN BLOOD BY AUTOMATED COUNT: 17.6 % (ref 12.3–15.4)
FLUAV RNA RESP QL NAA+PROBE: NOT DETECTED
FLUBV RNA RESP QL NAA+PROBE: NOT DETECTED
GLOBULIN UR ELPH-MCNC: 4.4 GM/DL
GLUCOSE BLDC GLUCOMTR-MCNC: 257 MG/DL (ref 70–130)
GLUCOSE BLDC GLUCOMTR-MCNC: 280 MG/DL (ref 70–130)
GLUCOSE SERPL-MCNC: 349 MG/DL (ref 65–99)
HCT VFR BLD AUTO: 20.8 % (ref 34–46.6)
HGB BLD-MCNC: 6.4 G/DL (ref 12–15.9)
HOLD SPECIMEN: NORMAL
HYPOCHROMIA BLD QL: ABNORMAL
LYMPHOCYTES # BLD MANUAL: 1.44 10*3/MM3 (ref 0.7–3.1)
LYMPHOCYTES NFR BLD MANUAL: 5 % (ref 5–12)
Lab: NORMAL
MAGNESIUM SERPL-MCNC: 1.6 MG/DL (ref 1.6–2.4)
MCH RBC QN AUTO: 25 PG (ref 26.6–33)
MCHC RBC AUTO-ENTMCNC: 30.8 G/DL (ref 31.5–35.7)
MCV RBC AUTO: 81.3 FL (ref 79–97)
MONOCYTES # BLD: 0.65 10*3/MM3 (ref 0.1–0.9)
NEUTROPHILS # BLD AUTO: 10.85 10*3/MM3 (ref 1.7–7)
NEUTROPHILS NFR BLD MANUAL: 79 % (ref 42.7–76)
NEUTS BAND NFR BLD MANUAL: 4 % (ref 0–5)
PHOSPHATE SERPL-MCNC: 5.3 MG/DL (ref 2.5–4.5)
PLAT MORPH BLD: NORMAL
PLATELET # BLD AUTO: 438 10*3/MM3 (ref 140–450)
PMV BLD AUTO: 9 FL (ref 6–12)
POLYCHROMASIA BLD QL SMEAR: ABNORMAL
POTASSIUM SERPL-SCNC: 3.3 MMOL/L (ref 3.5–5.2)
PROT SERPL-MCNC: 7.9 G/DL (ref 6–8.5)
RBC # BLD AUTO: 2.56 10*6/MM3 (ref 3.77–5.28)
RH BLD: POSITIVE
SARS-COV-2 RNA RESP QL NAA+PROBE: NOT DETECTED
SODIUM SERPL-SCNC: 130 MMOL/L (ref 136–145)
T&S EXPIRATION DATE: NORMAL
VARIANT LYMPHS NFR BLD MANUAL: 11 % (ref 19.6–45.3)
WBC MORPH BLD: NORMAL
WBC NRBC COR # BLD: 13.07 10*3/MM3 (ref 3.4–10.8)
WHOLE BLOOD HOLD COAG: NORMAL
WHOLE BLOOD HOLD SPECIMEN: NORMAL

## 2022-05-27 PROCEDURE — G0378 HOSPITAL OBSERVATION PER HR: HCPCS

## 2022-05-27 PROCEDURE — 25010000002 FUROSEMIDE PER 20 MG: Performed by: INTERNAL MEDICINE

## 2022-05-27 PROCEDURE — 99284 EMERGENCY DEPT VISIT MOD MDM: CPT

## 2022-05-27 PROCEDURE — 36415 COLL VENOUS BLD VENIPUNCTURE: CPT | Performed by: STUDENT IN AN ORGANIZED HEALTH CARE EDUCATION/TRAINING PROGRAM

## 2022-05-27 PROCEDURE — 86923 COMPATIBILITY TEST ELECTRIC: CPT

## 2022-05-27 PROCEDURE — 96375 TX/PRO/DX INJ NEW DRUG ADDON: CPT

## 2022-05-27 PROCEDURE — 94760 N-INVAS EAR/PLS OXIMETRY 1: CPT

## 2022-05-27 PROCEDURE — 85007 BL SMEAR W/DIFF WBC COUNT: CPT | Performed by: EMERGENCY MEDICINE

## 2022-05-27 PROCEDURE — 25010000002 HYDRALAZINE PER 20 MG: Performed by: STUDENT IN AN ORGANIZED HEALTH CARE EDUCATION/TRAINING PROGRAM

## 2022-05-27 PROCEDURE — 87636 SARSCOV2 & INF A&B AMP PRB: CPT

## 2022-05-27 PROCEDURE — 63710000001 INSULIN ASPART PER 5 UNITS: Performed by: STUDENT IN AN ORGANIZED HEALTH CARE EDUCATION/TRAINING PROGRAM

## 2022-05-27 PROCEDURE — 86900 BLOOD TYPING SEROLOGIC ABO: CPT | Performed by: STUDENT IN AN ORGANIZED HEALTH CARE EDUCATION/TRAINING PROGRAM

## 2022-05-27 PROCEDURE — 80053 COMPREHEN METABOLIC PANEL: CPT | Performed by: EMERGENCY MEDICINE

## 2022-05-27 PROCEDURE — 86900 BLOOD TYPING SEROLOGIC ABO: CPT

## 2022-05-27 PROCEDURE — 36430 TRANSFUSION BLD/BLD COMPNT: CPT

## 2022-05-27 PROCEDURE — C9803 HOPD COVID-19 SPEC COLLECT: HCPCS

## 2022-05-27 PROCEDURE — 83735 ASSAY OF MAGNESIUM: CPT | Performed by: STUDENT IN AN ORGANIZED HEALTH CARE EDUCATION/TRAINING PROGRAM

## 2022-05-27 PROCEDURE — 86850 RBC ANTIBODY SCREEN: CPT | Performed by: STUDENT IN AN ORGANIZED HEALTH CARE EDUCATION/TRAINING PROGRAM

## 2022-05-27 PROCEDURE — 99220 PR INITIAL OBSERVATION CARE/DAY 70 MINUTES: CPT | Performed by: STUDENT IN AN ORGANIZED HEALTH CARE EDUCATION/TRAINING PROGRAM

## 2022-05-27 PROCEDURE — 86901 BLOOD TYPING SEROLOGIC RH(D): CPT | Performed by: STUDENT IN AN ORGANIZED HEALTH CARE EDUCATION/TRAINING PROGRAM

## 2022-05-27 PROCEDURE — P9016 RBC LEUKOCYTES REDUCED: HCPCS

## 2022-05-27 PROCEDURE — 63710000001 INSULIN DETEMIR PER 5 UNITS: Performed by: STUDENT IN AN ORGANIZED HEALTH CARE EDUCATION/TRAINING PROGRAM

## 2022-05-27 PROCEDURE — 94799 UNLISTED PULMONARY SVC/PX: CPT

## 2022-05-27 PROCEDURE — 84100 ASSAY OF PHOSPHORUS: CPT | Performed by: STUDENT IN AN ORGANIZED HEALTH CARE EDUCATION/TRAINING PROGRAM

## 2022-05-27 PROCEDURE — 82962 GLUCOSE BLOOD TEST: CPT

## 2022-05-27 PROCEDURE — 70450 CT HEAD/BRAIN W/O DYE: CPT

## 2022-05-27 PROCEDURE — 85025 COMPLETE CBC W/AUTO DIFF WBC: CPT | Performed by: EMERGENCY MEDICINE

## 2022-05-27 PROCEDURE — 96374 THER/PROPH/DIAG INJ IV PUSH: CPT

## 2022-05-27 PROCEDURE — 94640 AIRWAY INHALATION TREATMENT: CPT

## 2022-05-27 RX ORDER — POLYETHYLENE GLYCOL 3350 17 G/17G
17 POWDER, FOR SOLUTION ORAL DAILY PRN
Status: DISCONTINUED | OUTPATIENT
Start: 2022-05-27 | End: 2022-05-28 | Stop reason: HOSPADM

## 2022-05-27 RX ORDER — SODIUM CHLORIDE 0.9 % (FLUSH) 0.9 %
10 SYRINGE (ML) INJECTION AS NEEDED
Status: DISCONTINUED | OUTPATIENT
Start: 2022-05-27 | End: 2022-05-28 | Stop reason: HOSPADM

## 2022-05-27 RX ORDER — LEVOTHYROXINE SODIUM 0.03 MG/1
25 TABLET ORAL
Status: DISCONTINUED | OUTPATIENT
Start: 2022-05-28 | End: 2022-05-28 | Stop reason: HOSPADM

## 2022-05-27 RX ORDER — FAMOTIDINE 40 MG/1
40 TABLET, FILM COATED ORAL DAILY
Status: DISCONTINUED | OUTPATIENT
Start: 2022-05-27 | End: 2022-05-27 | Stop reason: DRUGHIGH

## 2022-05-27 RX ORDER — MONTELUKAST SODIUM 10 MG/1
10 TABLET ORAL NIGHTLY
Status: DISCONTINUED | OUTPATIENT
Start: 2022-05-27 | End: 2022-05-28 | Stop reason: HOSPADM

## 2022-05-27 RX ORDER — NICOTINE POLACRILEX 4 MG
15 LOZENGE BUCCAL
Status: DISCONTINUED | OUTPATIENT
Start: 2022-05-27 | End: 2022-05-28 | Stop reason: HOSPADM

## 2022-05-27 RX ORDER — DEXTROSE MONOHYDRATE 25 G/50ML
25 INJECTION, SOLUTION INTRAVENOUS
Status: DISCONTINUED | OUTPATIENT
Start: 2022-05-27 | End: 2022-05-28 | Stop reason: HOSPADM

## 2022-05-27 RX ORDER — ATORVASTATIN CALCIUM 20 MG/1
20 TABLET, FILM COATED ORAL DAILY
Status: DISCONTINUED | OUTPATIENT
Start: 2022-05-28 | End: 2022-05-28 | Stop reason: HOSPADM

## 2022-05-27 RX ORDER — GABAPENTIN 300 MG/1
300 CAPSULE ORAL DAILY
Status: DISCONTINUED | OUTPATIENT
Start: 2022-05-28 | End: 2022-05-28 | Stop reason: HOSPADM

## 2022-05-27 RX ORDER — BISACODYL 5 MG/1
5 TABLET, DELAYED RELEASE ORAL DAILY PRN
Status: DISCONTINUED | OUTPATIENT
Start: 2022-05-27 | End: 2022-05-28 | Stop reason: HOSPADM

## 2022-05-27 RX ORDER — SODIUM CHLORIDE 0.9 % (FLUSH) 0.9 %
10 SYRINGE (ML) INJECTION EVERY 12 HOURS SCHEDULED
Status: DISCONTINUED | OUTPATIENT
Start: 2022-05-27 | End: 2022-05-28 | Stop reason: HOSPADM

## 2022-05-27 RX ORDER — LISINOPRIL 20 MG/1
20 TABLET ORAL DAILY
Status: DISCONTINUED | OUTPATIENT
Start: 2022-05-28 | End: 2022-05-28 | Stop reason: HOSPADM

## 2022-05-27 RX ORDER — ONDANSETRON 4 MG/1
4 TABLET, FILM COATED ORAL EVERY 6 HOURS PRN
Status: DISCONTINUED | OUTPATIENT
Start: 2022-05-27 | End: 2022-05-28 | Stop reason: HOSPADM

## 2022-05-27 RX ORDER — ISOSORBIDE MONONITRATE 30 MG/1
30 TABLET, EXTENDED RELEASE ORAL EVERY MORNING
Status: DISCONTINUED | OUTPATIENT
Start: 2022-05-28 | End: 2022-05-28 | Stop reason: HOSPADM

## 2022-05-27 RX ORDER — RANOLAZINE 500 MG/1
500 TABLET, EXTENDED RELEASE ORAL EVERY 12 HOURS SCHEDULED
Status: DISCONTINUED | OUTPATIENT
Start: 2022-05-27 | End: 2022-05-28 | Stop reason: HOSPADM

## 2022-05-27 RX ORDER — POTASSIUM CHLORIDE 7.45 MG/ML
10 INJECTION INTRAVENOUS
Status: DISCONTINUED | OUTPATIENT
Start: 2022-05-27 | End: 2022-05-28 | Stop reason: HOSPADM

## 2022-05-27 RX ORDER — INSULIN ASPART 100 [IU]/ML
12 INJECTION, SOLUTION INTRAVENOUS; SUBCUTANEOUS
Status: DISCONTINUED | OUTPATIENT
Start: 2022-05-27 | End: 2022-05-28 | Stop reason: HOSPADM

## 2022-05-27 RX ORDER — SODIUM BICARBONATE 650 MG/1
650 TABLET ORAL DAILY
Status: DISCONTINUED | OUTPATIENT
Start: 2022-05-28 | End: 2022-05-28 | Stop reason: HOSPADM

## 2022-05-27 RX ORDER — MAGNESIUM SULFATE HEPTAHYDRATE 40 MG/ML
2 INJECTION, SOLUTION INTRAVENOUS AS NEEDED
Status: DISCONTINUED | OUTPATIENT
Start: 2022-05-27 | End: 2022-05-28 | Stop reason: HOSPADM

## 2022-05-27 RX ORDER — DULOXETIN HYDROCHLORIDE 20 MG/1
40 CAPSULE, DELAYED RELEASE ORAL DAILY
Status: DISCONTINUED | OUTPATIENT
Start: 2022-05-28 | End: 2022-05-28 | Stop reason: HOSPADM

## 2022-05-27 RX ORDER — OXYBUTYNIN CHLORIDE 5 MG/1
5 TABLET, EXTENDED RELEASE ORAL DAILY
Status: DISCONTINUED | OUTPATIENT
Start: 2022-05-28 | End: 2022-05-28 | Stop reason: HOSPADM

## 2022-05-27 RX ORDER — POTASSIUM CHLORIDE 750 MG/1
40 CAPSULE, EXTENDED RELEASE ORAL ONCE
Status: COMPLETED | OUTPATIENT
Start: 2022-05-27 | End: 2022-05-27

## 2022-05-27 RX ORDER — ACETAMINOPHEN 325 MG/1
650 TABLET ORAL EVERY 4 HOURS PRN
Status: DISCONTINUED | OUTPATIENT
Start: 2022-05-27 | End: 2022-05-28 | Stop reason: HOSPADM

## 2022-05-27 RX ORDER — HYDRALAZINE HYDROCHLORIDE 20 MG/ML
10 INJECTION INTRAMUSCULAR; INTRAVENOUS EVERY 8 HOURS PRN
Status: DISCONTINUED | OUTPATIENT
Start: 2022-05-27 | End: 2022-05-28 | Stop reason: HOSPADM

## 2022-05-27 RX ORDER — ONDANSETRON 2 MG/ML
4 INJECTION INTRAMUSCULAR; INTRAVENOUS EVERY 6 HOURS PRN
Status: DISCONTINUED | OUTPATIENT
Start: 2022-05-27 | End: 2022-05-28 | Stop reason: HOSPADM

## 2022-05-27 RX ORDER — BISACODYL 10 MG
10 SUPPOSITORY, RECTAL RECTAL DAILY PRN
Status: DISCONTINUED | OUTPATIENT
Start: 2022-05-27 | End: 2022-05-28 | Stop reason: HOSPADM

## 2022-05-27 RX ORDER — ALBUTEROL SULFATE 2.5 MG/3ML
2.5 SOLUTION RESPIRATORY (INHALATION) EVERY 4 HOURS PRN
Status: DISCONTINUED | OUTPATIENT
Start: 2022-05-27 | End: 2022-05-28 | Stop reason: HOSPADM

## 2022-05-27 RX ORDER — CETIRIZINE HYDROCHLORIDE 1 MG/ML
2.5 SOLUTION ORAL DAILY PRN
Status: DISCONTINUED | OUTPATIENT
Start: 2022-05-27 | End: 2022-05-28 | Stop reason: HOSPADM

## 2022-05-27 RX ORDER — ASPIRIN 81 MG/1
81 TABLET ORAL DAILY
Status: DISCONTINUED | OUTPATIENT
Start: 2022-05-28 | End: 2022-05-28 | Stop reason: HOSPADM

## 2022-05-27 RX ORDER — INSULIN ASPART 100 [IU]/ML
0-9 INJECTION, SOLUTION INTRAVENOUS; SUBCUTANEOUS
Status: DISCONTINUED | OUTPATIENT
Start: 2022-05-27 | End: 2022-05-28 | Stop reason: HOSPADM

## 2022-05-27 RX ORDER — FUROSEMIDE 10 MG/ML
80 INJECTION INTRAMUSCULAR; INTRAVENOUS ONCE
Status: COMPLETED | OUTPATIENT
Start: 2022-05-27 | End: 2022-05-27

## 2022-05-27 RX ORDER — HYDROCODONE BITARTRATE AND ACETAMINOPHEN 5; 325 MG/1; MG/1
1 TABLET ORAL EVERY 4 HOURS PRN
Status: DISCONTINUED | OUTPATIENT
Start: 2022-05-27 | End: 2022-05-28 | Stop reason: HOSPADM

## 2022-05-27 RX ORDER — FAMOTIDINE 10 MG
10 TABLET ORAL DAILY
Status: DISCONTINUED | OUTPATIENT
Start: 2022-05-27 | End: 2022-05-28 | Stop reason: HOSPADM

## 2022-05-27 RX ORDER — NITROGLYCERIN 0.4 MG/1
0.4 TABLET SUBLINGUAL
Status: DISCONTINUED | OUTPATIENT
Start: 2022-05-27 | End: 2022-05-28 | Stop reason: HOSPADM

## 2022-05-27 RX ORDER — ALBUTEROL SULFATE 90 UG/1
2 AEROSOL, METERED RESPIRATORY (INHALATION) EVERY 4 HOURS PRN
Status: DISCONTINUED | OUTPATIENT
Start: 2022-05-27 | End: 2022-05-27 | Stop reason: CLARIF

## 2022-05-27 RX ORDER — SEVELAMER CARBONATE 800 MG/1
800 TABLET, FILM COATED ORAL
Status: DISCONTINUED | OUTPATIENT
Start: 2022-05-27 | End: 2022-05-28 | Stop reason: HOSPADM

## 2022-05-27 RX ORDER — LANOLIN ALCOHOL/MO/W.PET/CERES
5.25 CREAM (GRAM) TOPICAL NIGHTLY PRN
Status: DISCONTINUED | OUTPATIENT
Start: 2022-05-27 | End: 2022-05-28 | Stop reason: HOSPADM

## 2022-05-27 RX ORDER — MAGNESIUM SULFATE HEPTAHYDRATE 40 MG/ML
4 INJECTION, SOLUTION INTRAVENOUS AS NEEDED
Status: DISCONTINUED | OUTPATIENT
Start: 2022-05-27 | End: 2022-05-28 | Stop reason: HOSPADM

## 2022-05-27 RX ORDER — CLOPIDOGREL BISULFATE 75 MG/1
75 TABLET ORAL DAILY
Status: DISCONTINUED | OUTPATIENT
Start: 2022-05-28 | End: 2022-05-28 | Stop reason: HOSPADM

## 2022-05-27 RX ORDER — CYCLOBENZAPRINE HCL 10 MG
10 TABLET ORAL NIGHTLY PRN
Status: DISCONTINUED | OUTPATIENT
Start: 2022-05-27 | End: 2022-05-28 | Stop reason: HOSPADM

## 2022-05-27 RX ORDER — IPRATROPIUM BROMIDE AND ALBUTEROL SULFATE 2.5; .5 MG/3ML; MG/3ML
3 SOLUTION RESPIRATORY (INHALATION)
Status: DISCONTINUED | OUTPATIENT
Start: 2022-05-27 | End: 2022-05-28 | Stop reason: HOSPADM

## 2022-05-27 RX ORDER — AMOXICILLIN 250 MG
2 CAPSULE ORAL 2 TIMES DAILY
Status: DISCONTINUED | OUTPATIENT
Start: 2022-05-27 | End: 2022-05-28 | Stop reason: HOSPADM

## 2022-05-27 RX ADMIN — POTASSIUM CHLORIDE 40 MEQ: 750 CAPSULE, EXTENDED RELEASE ORAL at 14:50

## 2022-05-27 RX ADMIN — Medication 10 ML: at 21:54

## 2022-05-27 RX ADMIN — RANOLAZINE 500 MG: 500 TABLET, FILM COATED, EXTENDED RELEASE ORAL at 21:54

## 2022-05-27 RX ADMIN — IPRATROPIUM BROMIDE AND ALBUTEROL SULFATE 3 ML: 2.5; .5 SOLUTION RESPIRATORY (INHALATION) at 20:02

## 2022-05-27 RX ADMIN — SEVELAMER CARBONATE 800 MG: 800 TABLET, FILM COATED ORAL at 18:18

## 2022-05-27 RX ADMIN — MONTELUKAST 10 MG: 10 TABLET, FILM COATED ORAL at 21:53

## 2022-05-27 RX ADMIN — INSULIN ASPART 12 UNITS: 100 INJECTION, SOLUTION INTRAVENOUS; SUBCUTANEOUS at 18:21

## 2022-05-27 RX ADMIN — DOCUSATE SODIUM 50 MG AND SENNOSIDES 8.6 MG 2 TABLET: 8.6; 5 TABLET, FILM COATED ORAL at 21:53

## 2022-05-27 RX ADMIN — METOPROLOL TARTRATE 25 MG: 25 TABLET, FILM COATED ORAL at 21:53

## 2022-05-27 RX ADMIN — HYDROCODONE BITARTRATE AND ACETAMINOPHEN 1 TABLET: 5; 325 TABLET ORAL at 16:53

## 2022-05-27 RX ADMIN — HYDRALAZINE HYDROCHLORIDE 10 MG: 20 INJECTION INTRAMUSCULAR; INTRAVENOUS at 15:25

## 2022-05-27 RX ADMIN — HYDROCODONE BITARTRATE AND ACETAMINOPHEN 1 TABLET: 5; 325 TABLET ORAL at 21:53

## 2022-05-27 RX ADMIN — CYCLOBENZAPRINE HYDROCHLORIDE 10 MG: 10 TABLET, FILM COATED ORAL at 21:53

## 2022-05-27 RX ADMIN — FUROSEMIDE 80 MG: 10 INJECTION, SOLUTION INTRAMUSCULAR; INTRAVENOUS at 21:53

## 2022-05-27 RX ADMIN — INSULIN DETEMIR 25 UNITS: 100 INJECTION, SOLUTION SUBCUTANEOUS at 21:55

## 2022-05-27 RX ADMIN — INSULIN ASPART 4 UNITS: 100 INJECTION, SOLUTION INTRAVENOUS; SUBCUTANEOUS at 18:21

## 2022-05-27 RX ADMIN — FAMOTIDINE 10 MG: 10 TABLET ORAL at 18:23

## 2022-05-28 ENCOUNTER — READMISSION MANAGEMENT (OUTPATIENT)
Dept: CALL CENTER | Facility: HOSPITAL | Age: 62
End: 2022-05-28

## 2022-05-28 VITALS
OXYGEN SATURATION: 96 % | HEIGHT: 62 IN | DIASTOLIC BLOOD PRESSURE: 72 MMHG | HEART RATE: 79 BPM | TEMPERATURE: 97.7 F | BODY MASS INDEX: 53.92 KG/M2 | WEIGHT: 293 LBS | RESPIRATION RATE: 18 BRPM | SYSTOLIC BLOOD PRESSURE: 154 MMHG

## 2022-05-28 PROBLEM — Z99.2 NEED FOR ACUTE HEMODIALYSIS: Status: ACTIVE | Noted: 2022-05-28

## 2022-05-28 PROBLEM — I16.0 HYPERTENSIVE URGENCY: Status: RESOLVED | Noted: 2022-05-27 | Resolved: 2022-05-28

## 2022-05-28 PROBLEM — E87.6 HYPOKALEMIA: Status: RESOLVED | Noted: 2022-05-27 | Resolved: 2022-05-28

## 2022-05-28 PROBLEM — Z99.2 NEED FOR ACUTE HEMODIALYSIS: Status: RESOLVED | Noted: 2022-05-28 | Resolved: 2022-05-28

## 2022-05-28 LAB
ANION GAP SERPL CALCULATED.3IONS-SCNC: 15 MMOL/L (ref 5–15)
BASOPHILS # BLD AUTO: 0.06 10*3/MM3 (ref 0–0.2)
BASOPHILS NFR BLD AUTO: 0.6 % (ref 0–1.5)
BUN SERPL-MCNC: 57 MG/DL (ref 8–23)
BUN/CREAT SERPL: 13.8 (ref 7–25)
CALCIUM SPEC-SCNC: 8.9 MG/DL (ref 8.6–10.5)
CHLORIDE SERPL-SCNC: 89 MMOL/L (ref 98–107)
CO2 SERPL-SCNC: 25 MMOL/L (ref 22–29)
CREAT SERPL-MCNC: 4.13 MG/DL (ref 0.57–1)
DEPRECATED RDW RBC AUTO: 50.4 FL (ref 37–54)
EGFRCR SERPLBLD CKD-EPI 2021: 11.6 ML/MIN/1.73
EOSINOPHIL # BLD AUTO: 0.25 10*3/MM3 (ref 0–0.4)
EOSINOPHIL NFR BLD AUTO: 2.4 % (ref 0.3–6.2)
ERYTHROCYTE [DISTWIDTH] IN BLOOD BY AUTOMATED COUNT: 17.3 % (ref 12.3–15.4)
GLUCOSE BLDC GLUCOMTR-MCNC: 204 MG/DL (ref 70–130)
GLUCOSE BLDC GLUCOMTR-MCNC: 241 MG/DL (ref 70–130)
GLUCOSE SERPL-MCNC: 158 MG/DL (ref 65–99)
HCT VFR BLD AUTO: 22.9 % (ref 34–46.6)
HCT VFR BLD AUTO: 25.1 % (ref 34–46.6)
HGB BLD-MCNC: 7.3 G/DL (ref 12–15.9)
HGB BLD-MCNC: 8.1 G/DL (ref 12–15.9)
IMM GRANULOCYTES # BLD AUTO: 0.16 10*3/MM3 (ref 0–0.05)
IMM GRANULOCYTES NFR BLD AUTO: 1.5 % (ref 0–0.5)
LYMPHOCYTES # BLD AUTO: 1.88 10*3/MM3 (ref 0.7–3.1)
LYMPHOCYTES NFR BLD AUTO: 17.7 % (ref 19.6–45.3)
MAGNESIUM SERPL-MCNC: 1.7 MG/DL (ref 1.6–2.4)
MCH RBC QN AUTO: 26.4 PG (ref 26.6–33)
MCHC RBC AUTO-ENTMCNC: 31.9 G/DL (ref 31.5–35.7)
MCV RBC AUTO: 82.7 FL (ref 79–97)
MONOCYTES # BLD AUTO: 0.56 10*3/MM3 (ref 0.1–0.9)
MONOCYTES NFR BLD AUTO: 5.3 % (ref 5–12)
NEUTROPHILS NFR BLD AUTO: 7.7 10*3/MM3 (ref 1.7–7)
NEUTROPHILS NFR BLD AUTO: 72.5 % (ref 42.7–76)
NRBC BLD AUTO-RTO: 0 /100 WBC (ref 0–0.2)
PLATELET # BLD AUTO: 383 10*3/MM3 (ref 140–450)
PMV BLD AUTO: 9 FL (ref 6–12)
POTASSIUM SERPL-SCNC: 3.2 MMOL/L (ref 3.5–5.2)
POTASSIUM SERPL-SCNC: 3.8 MMOL/L (ref 3.5–5.2)
RBC # BLD AUTO: 2.77 10*6/MM3 (ref 3.77–5.28)
SODIUM SERPL-SCNC: 129 MMOL/L (ref 136–145)
WBC NRBC COR # BLD: 10.61 10*3/MM3 (ref 3.4–10.8)

## 2022-05-28 PROCEDURE — 94760 N-INVAS EAR/PLS OXIMETRY 1: CPT

## 2022-05-28 PROCEDURE — 94799 UNLISTED PULMONARY SVC/PX: CPT

## 2022-05-28 PROCEDURE — G0257 UNSCHED DIALYSIS ESRD PT HOS: HCPCS

## 2022-05-28 PROCEDURE — 85025 COMPLETE CBC W/AUTO DIFF WBC: CPT | Performed by: STUDENT IN AN ORGANIZED HEALTH CARE EDUCATION/TRAINING PROGRAM

## 2022-05-28 PROCEDURE — 83735 ASSAY OF MAGNESIUM: CPT | Performed by: STUDENT IN AN ORGANIZED HEALTH CARE EDUCATION/TRAINING PROGRAM

## 2022-05-28 PROCEDURE — 99217 PR OBSERVATION CARE DISCHARGE MANAGEMENT: CPT | Performed by: STUDENT IN AN ORGANIZED HEALTH CARE EDUCATION/TRAINING PROGRAM

## 2022-05-28 PROCEDURE — 86900 BLOOD TYPING SEROLOGIC ABO: CPT

## 2022-05-28 PROCEDURE — 85018 HEMOGLOBIN: CPT | Performed by: STUDENT IN AN ORGANIZED HEALTH CARE EDUCATION/TRAINING PROGRAM

## 2022-05-28 PROCEDURE — 82962 GLUCOSE BLOOD TEST: CPT

## 2022-05-28 PROCEDURE — 80048 BASIC METABOLIC PNL TOTAL CA: CPT | Performed by: STUDENT IN AN ORGANIZED HEALTH CARE EDUCATION/TRAINING PROGRAM

## 2022-05-28 PROCEDURE — G0378 HOSPITAL OBSERVATION PER HR: HCPCS

## 2022-05-28 PROCEDURE — 85014 HEMATOCRIT: CPT | Performed by: STUDENT IN AN ORGANIZED HEALTH CARE EDUCATION/TRAINING PROGRAM

## 2022-05-28 PROCEDURE — 84132 ASSAY OF SERUM POTASSIUM: CPT | Performed by: STUDENT IN AN ORGANIZED HEALTH CARE EDUCATION/TRAINING PROGRAM

## 2022-05-28 PROCEDURE — 25010000002 HEPARIN (PORCINE) PER 1000 UNITS: Performed by: INTERNAL MEDICINE

## 2022-05-28 PROCEDURE — 94664 DEMO&/EVAL PT USE INHALER: CPT

## 2022-05-28 PROCEDURE — P9016 RBC LEUKOCYTES REDUCED: HCPCS

## 2022-05-28 RX ORDER — HEPARIN SODIUM 1000 [USP'U]/ML
4000 INJECTION, SOLUTION INTRAVENOUS; SUBCUTANEOUS AS NEEDED
Status: DISCONTINUED | OUTPATIENT
Start: 2022-05-28 | End: 2022-05-28 | Stop reason: HOSPADM

## 2022-05-28 RX ADMIN — IPRATROPIUM BROMIDE AND ALBUTEROL SULFATE 3 ML: 2.5; .5 SOLUTION RESPIRATORY (INHALATION) at 11:28

## 2022-05-28 RX ADMIN — Medication 10 ML: at 10:28

## 2022-05-28 RX ADMIN — LEVOTHYROXINE SODIUM 25 MCG: 25 TABLET ORAL at 05:57

## 2022-05-28 RX ADMIN — ISOSORBIDE MONONITRATE 30 MG: 30 TABLET, EXTENDED RELEASE ORAL at 05:57

## 2022-05-28 RX ADMIN — IPRATROPIUM BROMIDE AND ALBUTEROL SULFATE 3 ML: 2.5; .5 SOLUTION RESPIRATORY (INHALATION) at 06:54

## 2022-05-28 RX ADMIN — HEPARIN SODIUM 4000 UNITS: 1000 INJECTION, SOLUTION INTRAVENOUS; SUBCUTANEOUS at 08:51

## 2022-05-28 NOTE — OUTREACH NOTE
Prep Survey    Flowsheet Row Responses   [facility] facility patient discharged from? [place]   Is LACE score < 7 ? No   Emergency Room discharge w/ pulse ox? No   Eligibility [facility]   Date of Admission 05/27/22   Date of Discharge 05/28/22   Discharge Disposition Home or Self Care   Discharge diagnosis Anemia due to chronic kidney disease, on chronic dialysis    Does the patient have one of the following disease processes/diagnoses(primary or secondary)? Other   Does the patient have Home health ordered? No   Is there a DME ordered? No   Prep survey completed? Yes          LEEANNA OLVERA - Registered Nurse

## 2022-05-29 NOTE — TELEPHONE ENCOUNTER
refill   bilateral upper extremity Active ROM was WFL (within functional limits)/bilateral  lower extremity Active ROM was WFL (within functional limits)

## 2022-05-30 ENCOUNTER — TRANSITIONAL CARE MANAGEMENT TELEPHONE ENCOUNTER (OUTPATIENT)
Dept: CALL CENTER | Facility: HOSPITAL | Age: 62
End: 2022-05-30

## 2022-05-30 NOTE — OUTREACH NOTE
Call Center TCM Note    Flowsheet Row Responses   [facility] patient discharged from? [place]   Does the patient have one of the following disease processes/diagnoses(primary or secondary)? Other   TCM attempt successful? No   Unsuccessful attempts Attempt 1  [No release]          Noah Green RN    5/30/2022, 14:02 CDT

## 2022-05-30 NOTE — OUTREACH NOTE
Call Center TCM Note    Flowsheet Row Responses   [facility] patient discharged from? [place]   Does the patient have one of the following disease processes/diagnoses(primary or secondary)? Other   TCM attempt successful? No   Unsuccessful attempts Attempt 2          Noah Green RN    5/30/2022, 14:21 CDT

## 2022-05-31 ENCOUNTER — TRANSITIONAL CARE MANAGEMENT TELEPHONE ENCOUNTER (OUTPATIENT)
Dept: CALL CENTER | Facility: HOSPITAL | Age: 62
End: 2022-05-31

## 2022-05-31 ENCOUNTER — TELEPHONE (OUTPATIENT)
Dept: FAMILY MEDICINE CLINIC | Facility: CLINIC | Age: 62
End: 2022-05-31

## 2022-05-31 NOTE — OUTREACH NOTE
Call Center TCM Note    Flowsheet Row Responses   [facility] patient discharged from? [place]   Does the patient have one of the following disease processes/diagnoses(primary or secondary)? Other   TCM attempt successful? No  [no verbal release]   Unsuccessful attempts Attempt 3          Roma Lopez RN    5/31/2022, 14:52 EDT

## 2022-06-01 ENCOUNTER — TELEPHONE (OUTPATIENT)
Dept: FAMILY MEDICINE CLINIC | Facility: CLINIC | Age: 62
End: 2022-06-01

## 2022-06-08 ENCOUNTER — READMISSION MANAGEMENT (OUTPATIENT)
Dept: CALL CENTER | Facility: HOSPITAL | Age: 62
End: 2022-06-08

## 2022-06-08 NOTE — OUTREACH NOTE
Medical Week 2 Survey    Flowsheet Row Responses   [facility] patient discharged from? [place]   Does the patient have one of the following disease processes/diagnoses(primary or secondary)? Other   Week 2 attempt successful? No   Unsuccessful attempts Attempt 1          MARY LIU - Licensed Nurse

## 2022-06-25 ENCOUNTER — APPOINTMENT (OUTPATIENT)
Dept: CT IMAGING | Facility: HOSPITAL | Age: 62
End: 2022-06-25

## 2022-06-25 ENCOUNTER — APPOINTMENT (OUTPATIENT)
Dept: GENERAL RADIOLOGY | Facility: HOSPITAL | Age: 62
End: 2022-06-25

## 2022-06-25 ENCOUNTER — ANESTHESIA EVENT (OUTPATIENT)
Dept: ICU | Facility: HOSPITAL | Age: 62
End: 2022-06-25

## 2022-06-25 ENCOUNTER — ANESTHESIA (OUTPATIENT)
Dept: ICU | Facility: HOSPITAL | Age: 62
End: 2022-06-25

## 2022-06-25 ENCOUNTER — HOSPITAL ENCOUNTER (INPATIENT)
Facility: HOSPITAL | Age: 62
LOS: 12 days | Discharge: HOME HEALTH | End: 2022-07-07
Attending: EMERGENCY MEDICINE | Admitting: FAMILY MEDICINE

## 2022-06-25 DIAGNOSIS — E66.01 MORBID OBESITY WITH BMI OF 50.0-59.9, ADULT: ICD-10-CM

## 2022-06-25 DIAGNOSIS — J81.0 ACUTE PULMONARY EDEMA: ICD-10-CM

## 2022-06-25 DIAGNOSIS — I73.9 PAD (PERIPHERAL ARTERY DISEASE): ICD-10-CM

## 2022-06-25 DIAGNOSIS — Z74.09 IMPAIRED MOBILITY AND ACTIVITIES OF DAILY LIVING: ICD-10-CM

## 2022-06-25 DIAGNOSIS — R13.13 PHARYNGEAL DYSPHAGIA: ICD-10-CM

## 2022-06-25 DIAGNOSIS — J95.851 VENTILATOR ASSOCIATED PNEUMONIA: ICD-10-CM

## 2022-06-25 DIAGNOSIS — J96.00 ACUTE RESPIRATORY FAILURE, UNSPECIFIED WHETHER WITH HYPOXIA OR HYPERCAPNIA: ICD-10-CM

## 2022-06-25 DIAGNOSIS — R73.9 HYPERGLYCEMIA: ICD-10-CM

## 2022-06-25 DIAGNOSIS — R41.82 ALTERED MENTAL STATUS, UNSPECIFIED ALTERED MENTAL STATUS TYPE: Primary | ICD-10-CM

## 2022-06-25 DIAGNOSIS — J96.02 ACUTE RESPIRATORY FAILURE WITH HYPOXIA AND HYPERCAPNIA: ICD-10-CM

## 2022-06-25 DIAGNOSIS — J96.01 ACUTE RESPIRATORY FAILURE WITH HYPOXIA AND HYPERCAPNIA: ICD-10-CM

## 2022-06-25 DIAGNOSIS — Z74.09 IMPAIRED FUNCTIONAL MOBILITY, BALANCE, GAIT, AND ENDURANCE: ICD-10-CM

## 2022-06-25 DIAGNOSIS — Z78.9 IMPAIRED MOBILITY AND ACTIVITIES OF DAILY LIVING: ICD-10-CM

## 2022-06-25 DIAGNOSIS — N18.6 ESRD (END STAGE RENAL DISEASE): ICD-10-CM

## 2022-06-25 DIAGNOSIS — R53.81 PHYSICAL DECONDITIONING: ICD-10-CM

## 2022-06-25 PROBLEM — E11.00 HYPEROSMOLAR HYPERGLYCEMIC STATE (HHS) (HCC): Status: ACTIVE | Noted: 2022-06-25

## 2022-06-25 LAB
ALBUMIN SERPL-MCNC: 2.9 G/DL (ref 3.5–5.2)
ALBUMIN SERPL-MCNC: 3.2 G/DL (ref 3.5–5.2)
ALBUMIN/GLOB SERPL: 0.7 G/DL
ALBUMIN/GLOB SERPL: 0.9 G/DL
ALP SERPL-CCNC: 230 U/L (ref 39–117)
ALP SERPL-CCNC: 241 U/L (ref 39–117)
ALT SERPL W P-5'-P-CCNC: 5 U/L (ref 1–33)
ALT SERPL W P-5'-P-CCNC: <5 U/L (ref 1–33)
AMPHET+METHAMPHET UR QL: NEGATIVE
AMPHETAMINES UR QL: NEGATIVE
ANION GAP SERPL CALCULATED.3IONS-SCNC: 16 MMOL/L (ref 5–15)
ANION GAP SERPL CALCULATED.3IONS-SCNC: 16 MMOL/L (ref 5–15)
ANISOCYTOSIS BLD QL: ABNORMAL
APAP SERPL-MCNC: <5 MCG/ML (ref 0–30)
APTT PPP: 30.4 SECONDS (ref 20–40.3)
ARTERIAL PATENCY WRIST A: ABNORMAL
ARTERIAL PATENCY WRIST A: ABNORMAL
AST SERPL-CCNC: 10 U/L (ref 1–32)
AST SERPL-CCNC: 8 U/L (ref 1–32)
ATMOSPHERIC PRESS: 746 MMHG
ATMOSPHERIC PRESS: 747 MMHG
BACTERIA UR QL AUTO: ABNORMAL /HPF
BARBITURATES UR QL SCN: NEGATIVE
BASE EXCESS BLDA CALC-SCNC: 0.8 MMOL/L (ref 0–2)
BASE EXCESS BLDA CALC-SCNC: 2.4 MMOL/L (ref 0–2)
BASOPHILS # BLD AUTO: 0.06 10*3/MM3 (ref 0–0.2)
BASOPHILS NFR BLD AUTO: 0.6 % (ref 0–1.5)
BDY SITE: ABNORMAL
BDY SITE: ABNORMAL
BENZODIAZ UR QL SCN: NEGATIVE
BILIRUB SERPL-MCNC: 0.2 MG/DL (ref 0–1.2)
BILIRUB SERPL-MCNC: 0.2 MG/DL (ref 0–1.2)
BILIRUB UR QL STRIP: NEGATIVE
BUN SERPL-MCNC: 55 MG/DL (ref 8–23)
BUN SERPL-MCNC: 56 MG/DL (ref 8–23)
BUN/CREAT SERPL: 14.2 (ref 7–25)
BUN/CREAT SERPL: 14.9 (ref 7–25)
BUPRENORPHINE SERPL-MCNC: NEGATIVE NG/ML
CALCIUM SPEC-SCNC: 8.1 MG/DL (ref 8.6–10.5)
CALCIUM SPEC-SCNC: 8.4 MG/DL (ref 8.6–10.5)
CANNABINOIDS SERPL QL: NEGATIVE
CHLORIDE SERPL-SCNC: 85 MMOL/L (ref 98–107)
CHLORIDE SERPL-SCNC: 85 MMOL/L (ref 98–107)
CLARITY UR: CLEAR
CO2 SERPL-SCNC: 24 MMOL/L (ref 22–29)
CO2 SERPL-SCNC: 26 MMOL/L (ref 22–29)
COCAINE UR QL: NEGATIVE
COLOR UR: YELLOW
CREAT SERPL-MCNC: 3.75 MG/DL (ref 0.57–1)
CREAT SERPL-MCNC: 3.87 MG/DL (ref 0.57–1)
D-LACTATE SERPL-SCNC: 2.9 MMOL/L (ref 0.5–2)
DEPRECATED RDW RBC AUTO: 47.7 FL (ref 37–54)
DEPRECATED RDW RBC AUTO: 48.3 FL (ref 37–54)
EGFRCR SERPLBLD CKD-EPI 2021: 12.5 ML/MIN/1.73
EGFRCR SERPLBLD CKD-EPI 2021: 13 ML/MIN/1.73
EOSINOPHIL # BLD AUTO: 0.16 10*3/MM3 (ref 0–0.4)
EOSINOPHIL # BLD MANUAL: 0.09 10*3/MM3 (ref 0–0.4)
EOSINOPHIL NFR BLD AUTO: 1.5 % (ref 0.3–6.2)
EOSINOPHIL NFR BLD MANUAL: 1 % (ref 0.3–6.2)
ERYTHROCYTE [DISTWIDTH] IN BLOOD BY AUTOMATED COUNT: 17.1 % (ref 12.3–15.4)
ERYTHROCYTE [DISTWIDTH] IN BLOOD BY AUTOMATED COUNT: 17.3 % (ref 12.3–15.4)
ETHANOL BLD-MCNC: <10 MG/DL (ref 0–10)
ETHANOL UR QL: <0.01 %
FLUAV SUBTYP SPEC NAA+PROBE: NOT DETECTED
FLUBV RNA ISLT QL NAA+PROBE: NOT DETECTED
GLOBULIN UR ELPH-MCNC: 3.7 GM/DL
GLOBULIN UR ELPH-MCNC: 4.1 GM/DL
GLUCOSE SERPL-MCNC: 576 MG/DL (ref 65–99)
GLUCOSE SERPL-MCNC: 605 MG/DL (ref 65–99)
GLUCOSE UR STRIP-MCNC: ABNORMAL MG/DL
HBA1C MFR BLD: 9.6 % (ref 4.8–5.6)
HCO3 BLDA-SCNC: 27.1 MMOL/L (ref 20–26)
HCO3 BLDA-SCNC: 27.9 MMOL/L (ref 20–26)
HCT VFR BLD AUTO: 18.3 % (ref 34–46.6)
HCT VFR BLD AUTO: 21.5 % (ref 34–46.6)
HGB BLD-MCNC: 6.1 G/DL (ref 12–15.9)
HGB BLD-MCNC: 6.9 G/DL (ref 12–15.9)
HGB UR QL STRIP.AUTO: NEGATIVE
HYALINE CASTS UR QL AUTO: ABNORMAL /LPF
HYPOCHROMIA BLD QL: ABNORMAL
IMM GRANULOCYTES # BLD AUTO: 0.09 10*3/MM3 (ref 0–0.05)
IMM GRANULOCYTES NFR BLD AUTO: 0.8 % (ref 0–0.5)
INHALED O2 CONCENTRATION: 100 %
INHALED O2 CONCENTRATION: 35 %
INR PPP: 1.11 (ref 0.8–1.2)
KETONES UR QL STRIP: NEGATIVE
LEUKOCYTE ESTERASE UR QL STRIP.AUTO: NEGATIVE
LIPASE SERPL-CCNC: 69 U/L (ref 13–60)
LYMPHOCYTES # BLD AUTO: 1.31 10*3/MM3 (ref 0.7–3.1)
LYMPHOCYTES # BLD MANUAL: 1.54 10*3/MM3 (ref 0.7–3.1)
LYMPHOCYTES NFR BLD AUTO: 12.3 % (ref 19.6–45.3)
LYMPHOCYTES NFR BLD MANUAL: 3 % (ref 5–12)
Lab: ABNORMAL
Lab: ABNORMAL
MAGNESIUM SERPL-MCNC: 1.6 MG/DL (ref 1.6–2.4)
MAGNESIUM SERPL-MCNC: 1.6 MG/DL (ref 1.6–2.4)
MCH RBC QN AUTO: 25.7 PG (ref 26.6–33)
MCH RBC QN AUTO: 26.9 PG (ref 26.6–33)
MCHC RBC AUTO-ENTMCNC: 32.1 G/DL (ref 31.5–35.7)
MCHC RBC AUTO-ENTMCNC: 33.3 G/DL (ref 31.5–35.7)
MCV RBC AUTO: 80.2 FL (ref 79–97)
MCV RBC AUTO: 80.6 FL (ref 79–97)
METHADONE UR QL SCN: NEGATIVE
MODALITY: ABNORMAL
MODALITY: ABNORMAL
MONOCYTES # BLD AUTO: 0.54 10*3/MM3 (ref 0.1–0.9)
MONOCYTES # BLD: 0.27 10*3/MM3 (ref 0.1–0.9)
MONOCYTES NFR BLD AUTO: 5.1 % (ref 5–12)
NEUTROPHILS # BLD AUTO: 7.17 10*3/MM3 (ref 1.7–7)
NEUTROPHILS NFR BLD AUTO: 79.7 % (ref 42.7–76)
NEUTROPHILS NFR BLD AUTO: 8.48 10*3/MM3 (ref 1.7–7)
NEUTROPHILS NFR BLD MANUAL: 78 % (ref 42.7–76)
NEUTS BAND NFR BLD MANUAL: 1 % (ref 0–5)
NITRITE UR QL STRIP: NEGATIVE
NRBC BLD AUTO-RTO: 0 /100 WBC (ref 0–0.2)
OPIATES UR QL: NEGATIVE
OSMOLALITY SERPL: 308 MOSM/KG (ref 280–301)
OSMOLALITY SERPL: 309 MOSM/KG (ref 280–301)
OXYCODONE UR QL SCN: NEGATIVE
PCO2 BLDA: 47.7 MM HG (ref 35–45)
PCO2 BLDA: 53.4 MM HG (ref 35–45)
PCP UR QL SCN: NEGATIVE
PEEP RESPIRATORY: 5 CM[H2O]
PEEP RESPIRATORY: 5 CM[H2O]
PH BLDA: 7.31 PH UNITS (ref 7.35–7.45)
PH BLDA: 7.38 PH UNITS (ref 7.35–7.45)
PH UR STRIP.AUTO: 6 [PH] (ref 5–9)
PHOSPHATE SERPL-MCNC: 4.7 MG/DL (ref 2.5–4.5)
PHOSPHATE SERPL-MCNC: 5 MG/DL (ref 2.5–4.5)
PLATELET # BLD AUTO: 317 10*3/MM3 (ref 140–450)
PLATELET # BLD AUTO: 317 10*3/MM3 (ref 140–450)
PMV BLD AUTO: 9.1 FL (ref 6–12)
PMV BLD AUTO: 9.1 FL (ref 6–12)
PO2 BLDA: 110 MM HG (ref 83–108)
PO2 BLDA: 348 MM HG (ref 83–108)
POLYCHROMASIA BLD QL SMEAR: ABNORMAL
POTASSIUM SERPL-SCNC: 3.5 MMOL/L (ref 3.5–5.2)
POTASSIUM SERPL-SCNC: 3.8 MMOL/L (ref 3.5–5.2)
PROPOXYPH UR QL: NEGATIVE
PROT SERPL-MCNC: 6.9 G/DL (ref 6–8.5)
PROT SERPL-MCNC: 7 G/DL (ref 6–8.5)
PROT UR QL STRIP: ABNORMAL
PROTHROMBIN TIME: 14.1 SECONDS (ref 11.1–15.3)
RBC # BLD AUTO: 2.27 10*6/MM3 (ref 3.77–5.28)
RBC # BLD AUTO: 2.68 10*6/MM3 (ref 3.77–5.28)
RBC # UR STRIP: ABNORMAL /HPF
REF LAB TEST METHOD: ABNORMAL
SALICYLATES SERPL-MCNC: <0.3 MG/DL
SAO2 % BLDCOA: 98.9 % (ref 94–99)
SAO2 % BLDCOA: >100 % (ref 94–99)
SARS-COV-2 RNA PNL SPEC NAA+PROBE: NOT DETECTED
SET MECH RESP RATE: 20
SET MECH RESP RATE: 20
SMALL PLATELETS BLD QL SMEAR: ADEQUATE
SODIUM SERPL-SCNC: 125 MMOL/L (ref 136–145)
SODIUM SERPL-SCNC: 127 MMOL/L (ref 136–145)
SP GR UR STRIP: 1.01 (ref 1–1.03)
SQUAMOUS #/AREA URNS HPF: ABNORMAL /HPF
TRICYCLICS UR QL SCN: NEGATIVE
TROPONIN T SERPL-MCNC: 0.06 NG/ML (ref 0–0.03)
TROPONIN T SERPL-MCNC: 0.06 NG/ML (ref 0–0.03)
UROBILINOGEN UR QL STRIP: ABNORMAL
VARIANT LYMPHS NFR BLD MANUAL: 17 % (ref 19.6–45.3)
VENTILATOR MODE: ABNORMAL
VENTILATOR MODE: AC
VT ON VENT VENT: 500 ML
VT ON VENT VENT: 500 ML
WBC # UR STRIP: ABNORMAL /HPF
WBC MORPH BLD: NORMAL
WBC NRBC COR # BLD: 10.64 10*3/MM3 (ref 3.4–10.8)
WBC NRBC COR # BLD: 9.07 10*3/MM3 (ref 3.4–10.8)

## 2022-06-25 PROCEDURE — 94799 UNLISTED PULMONARY SVC/PX: CPT

## 2022-06-25 PROCEDURE — C1751 CATH, INF, PER/CENT/MIDLINE: HCPCS | Performed by: ANESTHESIOLOGY

## 2022-06-25 PROCEDURE — 82077 ASSAY SPEC XCP UR&BREATH IA: CPT | Performed by: EMERGENCY MEDICINE

## 2022-06-25 PROCEDURE — 80053 COMPREHEN METABOLIC PANEL: CPT | Performed by: EMERGENCY MEDICINE

## 2022-06-25 PROCEDURE — 86901 BLOOD TYPING SEROLOGIC RH(D): CPT | Performed by: STUDENT IN AN ORGANIZED HEALTH CARE EDUCATION/TRAINING PROGRAM

## 2022-06-25 PROCEDURE — 93010 ELECTROCARDIOGRAM REPORT: CPT | Performed by: INTERNAL MEDICINE

## 2022-06-25 PROCEDURE — 84484 ASSAY OF TROPONIN QUANT: CPT | Performed by: EMERGENCY MEDICINE

## 2022-06-25 PROCEDURE — 80306 DRUG TEST PRSMV INSTRMNT: CPT | Performed by: EMERGENCY MEDICINE

## 2022-06-25 PROCEDURE — 86923 COMPATIBILITY TEST ELECTRIC: CPT

## 2022-06-25 PROCEDURE — 71045 X-RAY EXAM CHEST 1 VIEW: CPT

## 2022-06-25 PROCEDURE — 0 INSULIN REGULAR HUMAN PER 5 UNITS: Performed by: EMERGENCY MEDICINE

## 2022-06-25 PROCEDURE — 93005 ELECTROCARDIOGRAM TRACING: CPT | Performed by: STUDENT IN AN ORGANIZED HEALTH CARE EDUCATION/TRAINING PROGRAM

## 2022-06-25 PROCEDURE — 36600 WITHDRAWAL OF ARTERIAL BLOOD: CPT

## 2022-06-25 PROCEDURE — 70450 CT HEAD/BRAIN W/O DYE: CPT

## 2022-06-25 PROCEDURE — 94760 N-INVAS EAR/PLS OXIMETRY 1: CPT

## 2022-06-25 PROCEDURE — 25010000002 MIDAZOLAM 50 MG/10ML SOLUTION: Performed by: STUDENT IN AN ORGANIZED HEALTH CARE EDUCATION/TRAINING PROGRAM

## 2022-06-25 PROCEDURE — 87636 SARSCOV2 & INF A&B AMP PRB: CPT | Performed by: EMERGENCY MEDICINE

## 2022-06-25 PROCEDURE — 93005 ELECTROCARDIOGRAM TRACING: CPT | Performed by: EMERGENCY MEDICINE

## 2022-06-25 PROCEDURE — 85610 PROTHROMBIN TIME: CPT | Performed by: EMERGENCY MEDICINE

## 2022-06-25 PROCEDURE — 82803 BLOOD GASES ANY COMBINATION: CPT

## 2022-06-25 PROCEDURE — 85007 BL SMEAR W/DIFF WBC COUNT: CPT | Performed by: EMERGENCY MEDICINE

## 2022-06-25 PROCEDURE — 86900 BLOOD TYPING SEROLOGIC ABO: CPT | Performed by: STUDENT IN AN ORGANIZED HEALTH CARE EDUCATION/TRAINING PROGRAM

## 2022-06-25 PROCEDURE — 5A1955Z RESPIRATORY VENTILATION, GREATER THAN 96 CONSECUTIVE HOURS: ICD-10-PCS | Performed by: EMERGENCY MEDICINE

## 2022-06-25 PROCEDURE — 84100 ASSAY OF PHOSPHORUS: CPT | Performed by: EMERGENCY MEDICINE

## 2022-06-25 PROCEDURE — 99223 1ST HOSP IP/OBS HIGH 75: CPT | Performed by: STUDENT IN AN ORGANIZED HEALTH CARE EDUCATION/TRAINING PROGRAM

## 2022-06-25 PROCEDURE — 0 POTASSIUM CHLORIDE 10 MEQ/100ML SOLUTION: Performed by: STUDENT IN AN ORGANIZED HEALTH CARE EDUCATION/TRAINING PROGRAM

## 2022-06-25 PROCEDURE — 83930 ASSAY OF BLOOD OSMOLALITY: CPT | Performed by: EMERGENCY MEDICINE

## 2022-06-25 PROCEDURE — 83036 HEMOGLOBIN GLYCOSYLATED A1C: CPT | Performed by: EMERGENCY MEDICINE

## 2022-06-25 PROCEDURE — 82962 GLUCOSE BLOOD TEST: CPT

## 2022-06-25 PROCEDURE — 25010000002 MORPHINE PER 10 MG: Performed by: STUDENT IN AN ORGANIZED HEALTH CARE EDUCATION/TRAINING PROGRAM

## 2022-06-25 PROCEDURE — 81001 URINALYSIS AUTO W/SCOPE: CPT | Performed by: EMERGENCY MEDICINE

## 2022-06-25 PROCEDURE — 99291 CRITICAL CARE FIRST HOUR: CPT

## 2022-06-25 PROCEDURE — 85025 COMPLETE CBC W/AUTO DIFF WBC: CPT | Performed by: EMERGENCY MEDICINE

## 2022-06-25 PROCEDURE — 0BH17EZ INSERTION OF ENDOTRACHEAL AIRWAY INTO TRACHEA, VIA NATURAL OR ARTIFICIAL OPENING: ICD-10-PCS | Performed by: EMERGENCY MEDICINE

## 2022-06-25 PROCEDURE — 83690 ASSAY OF LIPASE: CPT | Performed by: EMERGENCY MEDICINE

## 2022-06-25 PROCEDURE — 80179 DRUG ASSAY SALICYLATE: CPT | Performed by: EMERGENCY MEDICINE

## 2022-06-25 PROCEDURE — 86850 RBC ANTIBODY SCREEN: CPT | Performed by: STUDENT IN AN ORGANIZED HEALTH CARE EDUCATION/TRAINING PROGRAM

## 2022-06-25 PROCEDURE — 31500 INSERT EMERGENCY AIRWAY: CPT

## 2022-06-25 PROCEDURE — 51702 INSERT TEMP BLADDER CATH: CPT

## 2022-06-25 PROCEDURE — 85730 THROMBOPLASTIN TIME PARTIAL: CPT | Performed by: EMERGENCY MEDICINE

## 2022-06-25 PROCEDURE — 83735 ASSAY OF MAGNESIUM: CPT | Performed by: EMERGENCY MEDICINE

## 2022-06-25 PROCEDURE — 25010000002 CEFTRIAXONE PER 250 MG: Performed by: EMERGENCY MEDICINE

## 2022-06-25 PROCEDURE — 80143 DRUG ASSAY ACETAMINOPHEN: CPT | Performed by: EMERGENCY MEDICINE

## 2022-06-25 PROCEDURE — 94002 VENT MGMT INPAT INIT DAY: CPT

## 2022-06-25 PROCEDURE — 83605 ASSAY OF LACTIC ACID: CPT | Performed by: STUDENT IN AN ORGANIZED HEALTH CARE EDUCATION/TRAINING PROGRAM

## 2022-06-25 RX ORDER — SODIUM CHLORIDE AND POTASSIUM CHLORIDE 150; 450 MG/100ML; MG/100ML
250 INJECTION, SOLUTION INTRAVENOUS CONTINUOUS PRN
Status: DISCONTINUED | OUTPATIENT
Start: 2022-06-25 | End: 2022-07-01

## 2022-06-25 RX ORDER — NICOTINE POLACRILEX 4 MG
15 LOZENGE BUCCAL
Status: DISCONTINUED | OUTPATIENT
Start: 2022-06-25 | End: 2022-06-26

## 2022-06-25 RX ORDER — PANTOPRAZOLE SODIUM 40 MG/10ML
40 INJECTION, POWDER, LYOPHILIZED, FOR SOLUTION INTRAVENOUS
Status: DISCONTINUED | OUTPATIENT
Start: 2022-06-26 | End: 2022-07-07 | Stop reason: HOSPADM

## 2022-06-25 RX ORDER — SODIUM CHLORIDE 0.9 % (FLUSH) 0.9 %
10 SYRINGE (ML) INJECTION EVERY 12 HOURS SCHEDULED
Status: DISCONTINUED | OUTPATIENT
Start: 2022-06-25 | End: 2022-07-01

## 2022-06-25 RX ORDER — CHLORHEXIDINE GLUCONATE 0.12 MG/ML
15 RINSE ORAL EVERY 12 HOURS SCHEDULED
Status: DISCONTINUED | OUTPATIENT
Start: 2022-06-25 | End: 2022-07-03

## 2022-06-25 RX ORDER — ETOMIDATE 2 MG/ML
INJECTION INTRAVENOUS
Status: COMPLETED | OUTPATIENT
Start: 2022-06-25 | End: 2022-06-25

## 2022-06-25 RX ORDER — DEXTROSE, SODIUM CHLORIDE, AND POTASSIUM CHLORIDE 5; .45; .15 G/100ML; G/100ML; G/100ML
150 INJECTION INTRAVENOUS CONTINUOUS PRN
Status: DISCONTINUED | OUTPATIENT
Start: 2022-06-25 | End: 2022-07-01

## 2022-06-25 RX ORDER — BISACODYL 10 MG
10 SUPPOSITORY, RECTAL RECTAL DAILY PRN
Status: DISCONTINUED | OUTPATIENT
Start: 2022-06-25 | End: 2022-07-07 | Stop reason: HOSPADM

## 2022-06-25 RX ORDER — NALOXONE HCL 0.4 MG/ML
0.4 VIAL (ML) INJECTION
Status: DISCONTINUED | OUTPATIENT
Start: 2022-06-25 | End: 2022-07-07 | Stop reason: HOSPADM

## 2022-06-25 RX ORDER — SODIUM CHLORIDE 0.9 % (FLUSH) 0.9 %
10 SYRINGE (ML) INJECTION AS NEEDED
Status: DISCONTINUED | OUTPATIENT
Start: 2022-06-25 | End: 2022-07-01

## 2022-06-25 RX ORDER — DEXTROSE AND SODIUM CHLORIDE 5; .9 G/100ML; G/100ML
150 INJECTION, SOLUTION INTRAVENOUS CONTINUOUS PRN
Status: DISCONTINUED | OUTPATIENT
Start: 2022-06-25 | End: 2022-07-01

## 2022-06-25 RX ORDER — DEXTROSE AND SODIUM CHLORIDE 5; .45 G/100ML; G/100ML
150 INJECTION, SOLUTION INTRAVENOUS CONTINUOUS PRN
Status: DISCONTINUED | OUTPATIENT
Start: 2022-06-25 | End: 2022-06-26

## 2022-06-25 RX ORDER — SODIUM CHLORIDE AND POTASSIUM CHLORIDE 150; 900 MG/100ML; MG/100ML
250 INJECTION, SOLUTION INTRAVENOUS CONTINUOUS PRN
Status: DISCONTINUED | OUTPATIENT
Start: 2022-06-25 | End: 2022-07-01

## 2022-06-25 RX ORDER — SODIUM CHLORIDE 9 MG/ML
75 INJECTION, SOLUTION INTRAVENOUS CONTINUOUS
Status: DISCONTINUED | OUTPATIENT
Start: 2022-06-25 | End: 2022-07-01

## 2022-06-25 RX ORDER — HEPARIN SODIUM 5000 [USP'U]/ML
5000 INJECTION, SOLUTION INTRAVENOUS; SUBCUTANEOUS EVERY 12 HOURS SCHEDULED
Status: DISCONTINUED | OUTPATIENT
Start: 2022-06-25 | End: 2022-06-25 | Stop reason: ALTCHOICE

## 2022-06-25 RX ORDER — SODIUM CHLORIDE AND POTASSIUM CHLORIDE 300; 900 MG/100ML; MG/100ML
250 INJECTION, SOLUTION INTRAVENOUS CONTINUOUS PRN
Status: DISCONTINUED | OUTPATIENT
Start: 2022-06-25 | End: 2022-07-01

## 2022-06-25 RX ORDER — SODIUM CHLORIDE 9 MG/ML
250 INJECTION, SOLUTION INTRAVENOUS CONTINUOUS PRN
Status: DISCONTINUED | OUTPATIENT
Start: 2022-06-25 | End: 2022-07-01

## 2022-06-25 RX ORDER — POLYETHYLENE GLYCOL 3350 17 G/17G
17 POWDER, FOR SOLUTION ORAL DAILY PRN
Status: DISCONTINUED | OUTPATIENT
Start: 2022-06-25 | End: 2022-07-07 | Stop reason: HOSPADM

## 2022-06-25 RX ORDER — DEXTROSE MONOHYDRATE 25 G/50ML
10-50 INJECTION, SOLUTION INTRAVENOUS
Status: DISCONTINUED | OUTPATIENT
Start: 2022-06-25 | End: 2022-07-07 | Stop reason: HOSPADM

## 2022-06-25 RX ORDER — AMOXICILLIN 250 MG
2 CAPSULE ORAL 2 TIMES DAILY
Status: DISCONTINUED | OUTPATIENT
Start: 2022-06-25 | End: 2022-07-07 | Stop reason: HOSPADM

## 2022-06-25 RX ORDER — DEXTROSE, SODIUM CHLORIDE, AND POTASSIUM CHLORIDE 5; .9; .15 G/100ML; G/100ML; G/100ML
150 INJECTION INTRAVENOUS CONTINUOUS PRN
Status: DISCONTINUED | OUTPATIENT
Start: 2022-06-25 | End: 2022-07-01

## 2022-06-25 RX ORDER — SODIUM CHLORIDE 450 MG/100ML
250 INJECTION, SOLUTION INTRAVENOUS CONTINUOUS PRN
Status: DISCONTINUED | OUTPATIENT
Start: 2022-06-25 | End: 2022-07-01

## 2022-06-25 RX ORDER — POTASSIUM CHLORIDE, DEXTROSE MONOHYDRATE AND SODIUM CHLORIDE 300; 5; 900 MG/100ML; G/100ML; MG/100ML
150 INJECTION, SOLUTION INTRAVENOUS CONTINUOUS PRN
Status: DISCONTINUED | OUTPATIENT
Start: 2022-06-25 | End: 2022-07-01

## 2022-06-25 RX ORDER — ROCURONIUM BROMIDE 10 MG/ML
INJECTION, SOLUTION INTRAVENOUS
Status: COMPLETED | OUTPATIENT
Start: 2022-06-25 | End: 2022-06-25

## 2022-06-25 RX ORDER — BUMETANIDE 0.25 MG/ML
0.5 INJECTION INTRAMUSCULAR; INTRAVENOUS EVERY 12 HOURS
Status: DISCONTINUED | OUTPATIENT
Start: 2022-06-25 | End: 2022-06-26

## 2022-06-25 RX ORDER — NOREPINEPHRINE BIT/0.9 % NACL 8 MG/250ML
.02-.3 INFUSION BOTTLE (ML) INTRAVENOUS
Status: DISCONTINUED | OUTPATIENT
Start: 2022-06-25 | End: 2022-07-01

## 2022-06-25 RX ORDER — POTASSIUM CHLORIDE 7.45 MG/ML
10 INJECTION INTRAVENOUS CONTINUOUS PRN
Status: DISCONTINUED | OUTPATIENT
Start: 2022-06-25 | End: 2022-07-07 | Stop reason: HOSPADM

## 2022-06-25 RX ORDER — MORPHINE SULFATE 2 MG/ML
1 INJECTION, SOLUTION INTRAMUSCULAR; INTRAVENOUS EVERY 4 HOURS PRN
Status: DISPENSED | OUTPATIENT
Start: 2022-06-25 | End: 2022-07-02

## 2022-06-25 RX ORDER — BISACODYL 5 MG/1
5 TABLET, DELAYED RELEASE ORAL DAILY PRN
Status: DISCONTINUED | OUTPATIENT
Start: 2022-06-25 | End: 2022-07-07 | Stop reason: HOSPADM

## 2022-06-25 RX ORDER — SODIUM CHLORIDE 9 MG/ML
INJECTION, SOLUTION INTRAVENOUS
Status: COMPLETED
Start: 2022-06-25 | End: 2022-06-25

## 2022-06-25 RX ORDER — SODIUM CHLORIDE 9 MG/ML
200 INJECTION, SOLUTION INTRAVENOUS CONTINUOUS PRN
Status: DISCONTINUED | OUTPATIENT
Start: 2022-06-25 | End: 2022-07-01

## 2022-06-25 RX ORDER — DEXTROSE, SODIUM CHLORIDE, AND POTASSIUM CHLORIDE 5; .45; .3 G/100ML; G/100ML; G/100ML
150 INJECTION INTRAVENOUS CONTINUOUS PRN
Status: DISCONTINUED | OUTPATIENT
Start: 2022-06-25 | End: 2022-07-01

## 2022-06-25 RX ADMIN — SODIUM CHLORIDE 1000 ML/HR: 9 INJECTION, SOLUTION INTRAVENOUS at 20:10

## 2022-06-25 RX ADMIN — ROCURONIUM BROMIDE 100 MG: 10 INJECTION INTRAVENOUS at 18:14

## 2022-06-25 RX ADMIN — MORPHINE SULFATE 1 MG: 2 INJECTION, SOLUTION INTRAMUSCULAR; INTRAVENOUS at 22:12

## 2022-06-25 RX ADMIN — Medication 10 ML: at 22:26

## 2022-06-25 RX ADMIN — ETOMIDATE 20 MG: 2 INJECTION, SOLUTION INTRAVENOUS at 18:12

## 2022-06-25 RX ADMIN — BUMETANIDE 0.5 MG: 0.25 INJECTION, SOLUTION INTRAMUSCULAR; INTRAVENOUS at 23:35

## 2022-06-25 RX ADMIN — POTASSIUM CHLORIDE 10 MEQ: 7.46 INJECTION, SOLUTION INTRAVENOUS at 23:13

## 2022-06-25 RX ADMIN — MIDAZOLAM 1 MG/HR: 5 INJECTION INTRAMUSCULAR; INTRAVENOUS at 21:31

## 2022-06-25 RX ADMIN — SODIUM CHLORIDE 200 ML/HR: 9 INJECTION, SOLUTION INTRAVENOUS at 23:12

## 2022-06-25 RX ADMIN — SODIUM CHLORIDE 9.9 UNITS/HR: 9 INJECTION, SOLUTION INTRAVENOUS at 19:56

## 2022-06-25 RX ADMIN — SODIUM CHLORIDE 125 ML/HR: 9 INJECTION, SOLUTION INTRAVENOUS at 18:43

## 2022-06-25 RX ADMIN — CHLORHEXIDINE GLUCONATE 15 ML: 1.2 SOLUTION ORAL at 22:26

## 2022-06-25 RX ADMIN — Medication 0.02 MCG/KG/MIN: at 22:34

## 2022-06-25 RX ADMIN — ACTIVATED CHARCOAL 50 G: 208 SUSPENSION ORAL at 19:22

## 2022-06-26 PROBLEM — J96.01 ACUTE RESPIRATORY FAILURE WITH HYPOXIA AND HYPERCAPNIA: Status: ACTIVE | Noted: 2022-06-26

## 2022-06-26 PROBLEM — R65.20: Status: ACTIVE | Noted: 2022-06-26

## 2022-06-26 PROBLEM — Z99.11 ON MECHANICALLY ASSISTED VENTILATION: Status: ACTIVE | Noted: 2022-06-26

## 2022-06-26 PROBLEM — A41.9 SEVERE SEPSIS WITH SEPTIC SHOCK: Status: ACTIVE | Noted: 2022-06-26

## 2022-06-26 PROBLEM — T50.901A ACCIDENTAL DRUG OVERDOSE: Status: ACTIVE | Noted: 2022-06-26

## 2022-06-26 PROBLEM — D64.9 ANEMIA: Status: ACTIVE | Noted: 2022-06-26

## 2022-06-26 PROBLEM — J90 PLEURAL EFFUSION ON RIGHT: Status: ACTIVE | Noted: 2022-06-26

## 2022-06-26 PROBLEM — R65.21 SEVERE SEPSIS WITH SEPTIC SHOCK: Status: ACTIVE | Noted: 2022-06-26

## 2022-06-26 PROBLEM — J96.02 ACUTE RESPIRATORY FAILURE WITH HYPOXIA AND HYPERCAPNIA: Status: ACTIVE | Noted: 2022-06-26

## 2022-06-26 LAB
ABO GROUP BLD: NORMAL
ALBUMIN SERPL-MCNC: 2.5 G/DL (ref 3.5–5.2)
ALBUMIN/GLOB SERPL: 0.7 G/DL
ALP SERPL-CCNC: 154 U/L (ref 39–117)
ALT SERPL W P-5'-P-CCNC: <5 U/L (ref 1–33)
ANION GAP SERPL CALCULATED.3IONS-SCNC: 11 MMOL/L (ref 5–15)
ANION GAP SERPL CALCULATED.3IONS-SCNC: 11 MMOL/L (ref 5–15)
ANION GAP SERPL CALCULATED.3IONS-SCNC: 12 MMOL/L (ref 5–15)
ANION GAP SERPL CALCULATED.3IONS-SCNC: 13 MMOL/L (ref 5–15)
ANION GAP SERPL CALCULATED.3IONS-SCNC: 15 MMOL/L (ref 5–15)
ANION GAP SERPL CALCULATED.3IONS-SCNC: 16 MMOL/L (ref 5–15)
ARTERIAL PATENCY WRIST A: ABNORMAL
AST SERPL-CCNC: 7 U/L (ref 1–32)
ATMOSPHERIC PRESS: 749 MMHG
BASE EXCESS BLDA CALC-SCNC: 2.8 MMOL/L (ref 0–2)
BASOPHILS # BLD AUTO: 0.04 10*3/MM3 (ref 0–0.2)
BASOPHILS NFR BLD AUTO: 0.5 % (ref 0–1.5)
BDY SITE: ABNORMAL
BILIRUB SERPL-MCNC: 0.4 MG/DL (ref 0–1.2)
BLD GP AB SCN SERPL QL: NEGATIVE
BUN SERPL-MCNC: 51 MG/DL (ref 8–23)
BUN SERPL-MCNC: 53 MG/DL (ref 8–23)
BUN SERPL-MCNC: 54 MG/DL (ref 8–23)
BUN/CREAT SERPL: 14 (ref 7–25)
BUN/CREAT SERPL: 14.3 (ref 7–25)
BUN/CREAT SERPL: 14.7 (ref 7–25)
BUN/CREAT SERPL: 14.8 (ref 7–25)
BUN/CREAT SERPL: 14.8 (ref 7–25)
BUN/CREAT SERPL: 15.1 (ref 7–25)
CALCIUM SPEC-SCNC: 7.8 MG/DL (ref 8.6–10.5)
CALCIUM SPEC-SCNC: 8 MG/DL (ref 8.6–10.5)
CALCIUM SPEC-SCNC: 8 MG/DL (ref 8.6–10.5)
CALCIUM SPEC-SCNC: 8.1 MG/DL (ref 8.6–10.5)
CALCIUM SPEC-SCNC: 8.3 MG/DL (ref 8.6–10.5)
CALCIUM SPEC-SCNC: 8.3 MG/DL (ref 8.6–10.5)
CHLORIDE SERPL-SCNC: 92 MMOL/L (ref 98–107)
CHLORIDE SERPL-SCNC: 96 MMOL/L (ref 98–107)
CHLORIDE SERPL-SCNC: 97 MMOL/L (ref 98–107)
CHLORIDE SERPL-SCNC: 98 MMOL/L (ref 98–107)
CO2 SERPL-SCNC: 22 MMOL/L (ref 22–29)
CO2 SERPL-SCNC: 22 MMOL/L (ref 22–29)
CO2 SERPL-SCNC: 24 MMOL/L (ref 22–29)
CO2 SERPL-SCNC: 25 MMOL/L (ref 22–29)
CO2 SERPL-SCNC: 27 MMOL/L (ref 22–29)
CO2 SERPL-SCNC: 27 MMOL/L (ref 22–29)
CREAT SERPL-MCNC: 3.44 MG/DL (ref 0.57–1)
CREAT SERPL-MCNC: 3.5 MG/DL (ref 0.57–1)
CREAT SERPL-MCNC: 3.59 MG/DL (ref 0.57–1)
CREAT SERPL-MCNC: 3.61 MG/DL (ref 0.57–1)
CREAT SERPL-MCNC: 3.7 MG/DL (ref 0.57–1)
CREAT SERPL-MCNC: 3.87 MG/DL (ref 0.57–1)
CRP SERPL-MCNC: 5.46 MG/DL (ref 0–0.5)
D-LACTATE SERPL-SCNC: 1.7 MMOL/L (ref 0.5–2)
DEPRECATED RDW RBC AUTO: 47.2 FL (ref 37–54)
EGFRCR SERPLBLD CKD-EPI 2021: 12.5 ML/MIN/1.73
EGFRCR SERPLBLD CKD-EPI 2021: 13.2 ML/MIN/1.73
EGFRCR SERPLBLD CKD-EPI 2021: 13.6 ML/MIN/1.73
EGFRCR SERPLBLD CKD-EPI 2021: 13.7 ML/MIN/1.73
EGFRCR SERPLBLD CKD-EPI 2021: 14.1 ML/MIN/1.73
EGFRCR SERPLBLD CKD-EPI 2021: 14.4 ML/MIN/1.73
EOSINOPHIL # BLD AUTO: 0.17 10*3/MM3 (ref 0–0.4)
EOSINOPHIL NFR BLD AUTO: 2.3 % (ref 0.3–6.2)
ERYTHROCYTE [DISTWIDTH] IN BLOOD BY AUTOMATED COUNT: 16.7 % (ref 12.3–15.4)
GLOBULIN UR ELPH-MCNC: 3.7 GM/DL
GLUCOSE BLDC GLUCOMTR-MCNC: 118 MG/DL (ref 70–130)
GLUCOSE BLDC GLUCOMTR-MCNC: 128 MG/DL (ref 70–130)
GLUCOSE BLDC GLUCOMTR-MCNC: 129 MG/DL (ref 70–130)
GLUCOSE BLDC GLUCOMTR-MCNC: 135 MG/DL (ref 70–130)
GLUCOSE BLDC GLUCOMTR-MCNC: 153 MG/DL (ref 70–130)
GLUCOSE BLDC GLUCOMTR-MCNC: 185 MG/DL (ref 70–130)
GLUCOSE BLDC GLUCOMTR-MCNC: 200 MG/DL (ref 70–130)
GLUCOSE BLDC GLUCOMTR-MCNC: 202 MG/DL (ref 70–130)
GLUCOSE BLDC GLUCOMTR-MCNC: 225 MG/DL (ref 70–130)
GLUCOSE BLDC GLUCOMTR-MCNC: 294 MG/DL (ref 70–130)
GLUCOSE BLDC GLUCOMTR-MCNC: 367 MG/DL (ref 70–130)
GLUCOSE SERPL-MCNC: 118 MG/DL (ref 65–99)
GLUCOSE SERPL-MCNC: 133 MG/DL (ref 65–99)
GLUCOSE SERPL-MCNC: 149 MG/DL (ref 65–99)
GLUCOSE SERPL-MCNC: 165 MG/DL (ref 65–99)
GLUCOSE SERPL-MCNC: 188 MG/DL (ref 65–99)
GLUCOSE SERPL-MCNC: 329 MG/DL (ref 65–99)
HCO3 BLDA-SCNC: 28.6 MMOL/L (ref 20–26)
HCT VFR BLD AUTO: 21.4 % (ref 34–46.6)
HCT VFR BLD AUTO: 27.3 % (ref 34–46.6)
HGB BLD-MCNC: 7 G/DL (ref 12–15.9)
HGB BLD-MCNC: 8.7 G/DL (ref 12–15.9)
IMM GRANULOCYTES # BLD AUTO: 0.04 10*3/MM3 (ref 0–0.05)
IMM GRANULOCYTES NFR BLD AUTO: 0.5 % (ref 0–0.5)
INHALED O2 CONCENTRATION: 30 %
LYMPHOCYTES # BLD AUTO: 1.91 10*3/MM3 (ref 0.7–3.1)
LYMPHOCYTES NFR BLD AUTO: 26 % (ref 19.6–45.3)
Lab: ABNORMAL
Lab: NORMAL
MAGNESIUM SERPL-MCNC: 1.6 MG/DL (ref 1.6–2.4)
MAGNESIUM SERPL-MCNC: 1.8 MG/DL (ref 1.6–2.4)
MAGNESIUM SERPL-MCNC: 2.1 MG/DL (ref 1.6–2.4)
MAGNESIUM SERPL-MCNC: 2.2 MG/DL (ref 1.6–2.4)
MCH RBC QN AUTO: 26.6 PG (ref 26.6–33)
MCHC RBC AUTO-ENTMCNC: 32.3 G/DL (ref 31.5–35.7)
MCV RBC AUTO: 82.5 FL (ref 79–97)
MODALITY: ABNORMAL
MONOCYTES # BLD AUTO: 0.4 10*3/MM3 (ref 0.1–0.9)
MONOCYTES NFR BLD AUTO: 5.4 % (ref 5–12)
MRSA DNA SPEC QL NAA+PROBE: POSITIVE
NEUTROPHILS NFR BLD AUTO: 4.78 10*3/MM3 (ref 1.7–7)
NEUTROPHILS NFR BLD AUTO: 65.3 % (ref 42.7–76)
NRBC BLD AUTO-RTO: 0 /100 WBC (ref 0–0.2)
PCO2 BLDA: 50.7 MM HG (ref 35–45)
PEEP RESPIRATORY: 5 CM[H2O]
PH BLDA: 7.36 PH UNITS (ref 7.35–7.45)
PHOSPHATE SERPL-MCNC: 3.8 MG/DL (ref 2.5–4.5)
PHOSPHATE SERPL-MCNC: 3.9 MG/DL (ref 2.5–4.5)
PHOSPHATE SERPL-MCNC: 3.9 MG/DL (ref 2.5–4.5)
PHOSPHATE SERPL-MCNC: 4 MG/DL (ref 2.5–4.5)
PLATELET # BLD AUTO: 247 10*3/MM3 (ref 140–450)
PMV BLD AUTO: 8.8 FL (ref 6–12)
PO2 BLDA: 85.7 MM HG (ref 83–108)
POTASSIUM SERPL-SCNC: 3 MMOL/L (ref 3.5–5.2)
POTASSIUM SERPL-SCNC: 3.1 MMOL/L (ref 3.5–5.2)
POTASSIUM SERPL-SCNC: 3.3 MMOL/L (ref 3.5–5.2)
POTASSIUM SERPL-SCNC: 3.6 MMOL/L (ref 3.5–5.2)
POTASSIUM SERPL-SCNC: 3.6 MMOL/L (ref 3.5–5.2)
POTASSIUM SERPL-SCNC: 3.9 MMOL/L (ref 3.5–5.2)
PROCALCITONIN SERPL-MCNC: 0.23 NG/ML (ref 0–0.25)
PROT SERPL-MCNC: 6.2 G/DL (ref 6–8.5)
RBC # BLD AUTO: 2.63 10*6/MM3 (ref 3.77–5.28)
RH BLD: POSITIVE
SAO2 % BLDCOA: 97.5 % (ref 94–99)
SET MECH RESP RATE: 26
SODIUM SERPL-SCNC: 132 MMOL/L (ref 136–145)
SODIUM SERPL-SCNC: 132 MMOL/L (ref 136–145)
SODIUM SERPL-SCNC: 134 MMOL/L (ref 136–145)
T&S EXPIRATION DATE: NORMAL
TOTAL RATE: 26 BREATHS/MINUTE
TROPONIN T SERPL-MCNC: 0.06 NG/ML (ref 0–0.03)
TSH SERPL DL<=0.05 MIU/L-ACNC: 0.46 UIU/ML (ref 0.27–4.2)
VENTILATOR MODE: AC
VT ON VENT VENT: 400 ML
WBC NRBC COR # BLD: 7.34 10*3/MM3 (ref 3.4–10.8)

## 2022-06-26 PROCEDURE — 85014 HEMATOCRIT: CPT | Performed by: STUDENT IN AN ORGANIZED HEALTH CARE EDUCATION/TRAINING PROGRAM

## 2022-06-26 PROCEDURE — P9016 RBC LEUKOCYTES REDUCED: HCPCS

## 2022-06-26 PROCEDURE — 25010000002 MORPHINE PER 10 MG: Performed by: STUDENT IN AN ORGANIZED HEALTH CARE EDUCATION/TRAINING PROGRAM

## 2022-06-26 PROCEDURE — 36430 TRANSFUSION BLD/BLD COMPNT: CPT

## 2022-06-26 PROCEDURE — 84443 ASSAY THYROID STIM HORMONE: CPT | Performed by: STUDENT IN AN ORGANIZED HEALTH CARE EDUCATION/TRAINING PROGRAM

## 2022-06-26 PROCEDURE — 25010000002 MIDAZOLAM 50 MG/10ML SOLUTION: Performed by: STUDENT IN AN ORGANIZED HEALTH CARE EDUCATION/TRAINING PROGRAM

## 2022-06-26 PROCEDURE — 25010000002 ALBUMIN HUMAN 25% PER 50 ML: Performed by: NURSE PRACTITIONER

## 2022-06-26 PROCEDURE — 63710000001 INSULIN DETEMIR PER 5 UNITS: Performed by: STUDENT IN AN ORGANIZED HEALTH CARE EDUCATION/TRAINING PROGRAM

## 2022-06-26 PROCEDURE — P9047 ALBUMIN (HUMAN), 25%, 50ML: HCPCS | Performed by: NURSE PRACTITIONER

## 2022-06-26 PROCEDURE — 86140 C-REACTIVE PROTEIN: CPT | Performed by: FAMILY MEDICINE

## 2022-06-26 PROCEDURE — 25010000002 MIDAZOLAM 50 MG/10ML SOLUTION 10 ML VIAL: Performed by: STUDENT IN AN ORGANIZED HEALTH CARE EDUCATION/TRAINING PROGRAM

## 2022-06-26 PROCEDURE — 84100 ASSAY OF PHOSPHORUS: CPT | Performed by: STUDENT IN AN ORGANIZED HEALTH CARE EDUCATION/TRAINING PROGRAM

## 2022-06-26 PROCEDURE — 36415 COLL VENOUS BLD VENIPUNCTURE: CPT | Performed by: STUDENT IN AN ORGANIZED HEALTH CARE EDUCATION/TRAINING PROGRAM

## 2022-06-26 PROCEDURE — 94799 UNLISTED PULMONARY SVC/PX: CPT

## 2022-06-26 PROCEDURE — 87040 BLOOD CULTURE FOR BACTERIA: CPT | Performed by: STUDENT IN AN ORGANIZED HEALTH CARE EDUCATION/TRAINING PROGRAM

## 2022-06-26 PROCEDURE — 36600 WITHDRAWAL OF ARTERIAL BLOOD: CPT

## 2022-06-26 PROCEDURE — 25010000002 VANCOMYCIN 10 G RECONSTITUTED SOLUTION: Performed by: STUDENT IN AN ORGANIZED HEALTH CARE EDUCATION/TRAINING PROGRAM

## 2022-06-26 PROCEDURE — 85025 COMPLETE CBC W/AUTO DIFF WBC: CPT | Performed by: STUDENT IN AN ORGANIZED HEALTH CARE EDUCATION/TRAINING PROGRAM

## 2022-06-26 PROCEDURE — 87641 MR-STAPH DNA AMP PROBE: CPT | Performed by: STUDENT IN AN ORGANIZED HEALTH CARE EDUCATION/TRAINING PROGRAM

## 2022-06-26 PROCEDURE — 82803 BLOOD GASES ANY COMBINATION: CPT

## 2022-06-26 PROCEDURE — 82962 GLUCOSE BLOOD TEST: CPT

## 2022-06-26 PROCEDURE — 0 INSULIN REGULAR HUMAN PER 5 UNITS: Performed by: STUDENT IN AN ORGANIZED HEALTH CARE EDUCATION/TRAINING PROGRAM

## 2022-06-26 PROCEDURE — 86900 BLOOD TYPING SEROLOGIC ABO: CPT

## 2022-06-26 PROCEDURE — 93010 ELECTROCARDIOGRAM REPORT: CPT | Performed by: INTERNAL MEDICINE

## 2022-06-26 PROCEDURE — 25010000002 MAGNESIUM SULFATE IN D5W 1G/100ML (PREMIX) 1-5 GM/100ML-% SOLUTION: Performed by: STUDENT IN AN ORGANIZED HEALTH CARE EDUCATION/TRAINING PROGRAM

## 2022-06-26 PROCEDURE — 99291 CRITICAL CARE FIRST HOUR: CPT | Performed by: STUDENT IN AN ORGANIZED HEALTH CARE EDUCATION/TRAINING PROGRAM

## 2022-06-26 PROCEDURE — 25010000002 MAGNESIUM SULFATE 2 GM/50ML SOLUTION: Performed by: STUDENT IN AN ORGANIZED HEALTH CARE EDUCATION/TRAINING PROGRAM

## 2022-06-26 PROCEDURE — 80053 COMPREHEN METABOLIC PANEL: CPT | Performed by: STUDENT IN AN ORGANIZED HEALTH CARE EDUCATION/TRAINING PROGRAM

## 2022-06-26 PROCEDURE — 83605 ASSAY OF LACTIC ACID: CPT | Performed by: STUDENT IN AN ORGANIZED HEALTH CARE EDUCATION/TRAINING PROGRAM

## 2022-06-26 PROCEDURE — 93005 ELECTROCARDIOGRAM TRACING: CPT | Performed by: STUDENT IN AN ORGANIZED HEALTH CARE EDUCATION/TRAINING PROGRAM

## 2022-06-26 PROCEDURE — 94003 VENT MGMT INPAT SUBQ DAY: CPT

## 2022-06-26 PROCEDURE — 83735 ASSAY OF MAGNESIUM: CPT | Performed by: STUDENT IN AN ORGANIZED HEALTH CARE EDUCATION/TRAINING PROGRAM

## 2022-06-26 PROCEDURE — 85018 HEMOGLOBIN: CPT | Performed by: STUDENT IN AN ORGANIZED HEALTH CARE EDUCATION/TRAINING PROGRAM

## 2022-06-26 PROCEDURE — 0 POTASSIUM CHLORIDE 10 MEQ/100ML SOLUTION: Performed by: STUDENT IN AN ORGANIZED HEALTH CARE EDUCATION/TRAINING PROGRAM

## 2022-06-26 PROCEDURE — 63710000001 INSULIN ASPART PER 5 UNITS: Performed by: STUDENT IN AN ORGANIZED HEALTH CARE EDUCATION/TRAINING PROGRAM

## 2022-06-26 PROCEDURE — 84484 ASSAY OF TROPONIN QUANT: CPT | Performed by: STUDENT IN AN ORGANIZED HEALTH CARE EDUCATION/TRAINING PROGRAM

## 2022-06-26 PROCEDURE — 84145 PROCALCITONIN (PCT): CPT | Performed by: STUDENT IN AN ORGANIZED HEALTH CARE EDUCATION/TRAINING PROGRAM

## 2022-06-26 PROCEDURE — 25010000002 CEFEPIME PER 500 MG: Performed by: STUDENT IN AN ORGANIZED HEALTH CARE EDUCATION/TRAINING PROGRAM

## 2022-06-26 RX ORDER — MAGNESIUM SULFATE 1 G/100ML
1 INJECTION INTRAVENOUS AS NEEDED
Status: DISCONTINUED | OUTPATIENT
Start: 2022-06-26 | End: 2022-07-07 | Stop reason: HOSPADM

## 2022-06-26 RX ORDER — POTASSIUM CHLORIDE 1.5 G/1.77G
40 POWDER, FOR SOLUTION ORAL AS NEEDED
Status: DISCONTINUED | OUTPATIENT
Start: 2022-06-26 | End: 2022-06-26

## 2022-06-26 RX ORDER — SODIUM CHLORIDE 450 MG/100ML
150 INJECTION, SOLUTION INTRAVENOUS CONTINUOUS PRN
Status: DISCONTINUED | OUTPATIENT
Start: 2022-06-26 | End: 2022-07-01

## 2022-06-26 RX ORDER — ALBUMIN (HUMAN) 12.5 G/50ML
25 SOLUTION INTRAVENOUS ONCE
Status: COMPLETED | OUTPATIENT
Start: 2022-06-26 | End: 2022-06-26

## 2022-06-26 RX ORDER — BUMETANIDE 0.25 MG/ML
2 INJECTION INTRAMUSCULAR; INTRAVENOUS EVERY 12 HOURS
Status: DISCONTINUED | OUTPATIENT
Start: 2022-06-26 | End: 2022-06-30

## 2022-06-26 RX ORDER — INSULIN ASPART 100 [IU]/ML
0-7 INJECTION, SOLUTION INTRAVENOUS; SUBCUTANEOUS
Status: DISCONTINUED | OUTPATIENT
Start: 2022-06-26 | End: 2022-07-04

## 2022-06-26 RX ORDER — MAGNESIUM SULFATE HEPTAHYDRATE 40 MG/ML
4 INJECTION, SOLUTION INTRAVENOUS AS NEEDED
Status: DISCONTINUED | OUTPATIENT
Start: 2022-06-26 | End: 2022-07-07 | Stop reason: HOSPADM

## 2022-06-26 RX ORDER — LISINOPRIL 20 MG/1
20 TABLET ORAL
Status: DISCONTINUED | OUTPATIENT
Start: 2022-06-26 | End: 2022-06-27

## 2022-06-26 RX ORDER — NICOTINE POLACRILEX 4 MG
15 LOZENGE BUCCAL
Status: DISCONTINUED | OUTPATIENT
Start: 2022-06-26 | End: 2022-07-07 | Stop reason: HOSPADM

## 2022-06-26 RX ORDER — POTASSIUM CHLORIDE 7.45 MG/ML
10 INJECTION INTRAVENOUS CONTINUOUS PRN
Status: DISCONTINUED | OUTPATIENT
Start: 2022-06-26 | End: 2022-07-07 | Stop reason: HOSPADM

## 2022-06-26 RX ORDER — POTASSIUM CHLORIDE 750 MG/1
40 CAPSULE, EXTENDED RELEASE ORAL AS NEEDED
Status: DISCONTINUED | OUTPATIENT
Start: 2022-06-26 | End: 2022-06-26

## 2022-06-26 RX ORDER — DEXTROSE MONOHYDRATE 25 G/50ML
25 INJECTION, SOLUTION INTRAVENOUS
Status: DISCONTINUED | OUTPATIENT
Start: 2022-06-26 | End: 2022-07-07 | Stop reason: HOSPADM

## 2022-06-26 RX ORDER — POTASSIUM CHLORIDE 7.45 MG/ML
10 INJECTION INTRAVENOUS
Status: DISCONTINUED | OUTPATIENT
Start: 2022-06-26 | End: 2022-06-26

## 2022-06-26 RX ORDER — MAGNESIUM SULFATE HEPTAHYDRATE 40 MG/ML
2 INJECTION, SOLUTION INTRAVENOUS AS NEEDED
Status: DISCONTINUED | OUTPATIENT
Start: 2022-06-26 | End: 2022-07-07 | Stop reason: HOSPADM

## 2022-06-26 RX ORDER — MAGNESIUM SULFATE 1 G/100ML
1 INJECTION INTRAVENOUS ONCE
Status: COMPLETED | OUTPATIENT
Start: 2022-06-26 | End: 2022-06-26

## 2022-06-26 RX ADMIN — INSULIN ASPART 2 UNITS: 100 INJECTION, SOLUTION INTRAVENOUS; SUBCUTANEOUS at 17:05

## 2022-06-26 RX ADMIN — POTASSIUM CHLORIDE 40 MEQ: 1.5 POWDER, FOR SOLUTION ORAL at 07:14

## 2022-06-26 RX ADMIN — VANCOMYCIN HYDROCHLORIDE 1750 MG: 10 INJECTION, POWDER, LYOPHILIZED, FOR SOLUTION INTRAVENOUS at 02:15

## 2022-06-26 RX ADMIN — SODIUM BICARBONATE 100 MEQ: 84 INJECTION INTRAVENOUS at 20:41

## 2022-06-26 RX ADMIN — DOCUSATE SODIUM 50 MG AND SENNOSIDES 8.6 MG 2 TABLET: 8.6; 5 TABLET, FILM COATED ORAL at 08:13

## 2022-06-26 RX ADMIN — MIDAZOLAM 10 MG/HR: 5 INJECTION INTRAMUSCULAR; INTRAVENOUS at 23:36

## 2022-06-26 RX ADMIN — MIDAZOLAM 10 MG/HR: 5 INJECTION INTRAMUSCULAR; INTRAVENOUS at 16:50

## 2022-06-26 RX ADMIN — BUMETANIDE 2 MG: 0.25 INJECTION, SOLUTION INTRAMUSCULAR; INTRAVENOUS at 23:40

## 2022-06-26 RX ADMIN — ALBUMIN (HUMAN) 25 G: 0.25 INJECTION, SOLUTION INTRAVENOUS at 09:09

## 2022-06-26 RX ADMIN — CEFEPIME HYDROCHLORIDE 2 G: 2 INJECTION, POWDER, FOR SOLUTION INTRAVENOUS at 23:40

## 2022-06-26 RX ADMIN — DOCUSATE SODIUM 50 MG AND SENNOSIDES 8.6 MG 2 TABLET: 8.6; 5 TABLET, FILM COATED ORAL at 20:39

## 2022-06-26 RX ADMIN — POTASSIUM CHLORIDE 10 MEQ: 7.46 INJECTION, SOLUTION INTRAVENOUS at 01:30

## 2022-06-26 RX ADMIN — CHLORHEXIDINE GLUCONATE 15 ML: 1.2 SOLUTION ORAL at 20:39

## 2022-06-26 RX ADMIN — MAGNESIUM SULFATE HEPTAHYDRATE 1 G: 1 INJECTION, SOLUTION INTRAVENOUS at 02:29

## 2022-06-26 RX ADMIN — SODIUM CHLORIDE 8.7 UNITS/HR: 9 INJECTION, SOLUTION INTRAVENOUS at 04:40

## 2022-06-26 RX ADMIN — SODIUM CHLORIDE 150 ML/HR: 4.5 INJECTION, SOLUTION INTRAVENOUS at 01:29

## 2022-06-26 RX ADMIN — INSULIN DETEMIR 10 UNITS: 100 INJECTION, SOLUTION SUBCUTANEOUS at 07:48

## 2022-06-26 RX ADMIN — Medication 10 ML: at 08:13

## 2022-06-26 RX ADMIN — Medication 10 ML: at 21:30

## 2022-06-26 RX ADMIN — MORPHINE SULFATE 1 MG: 2 INJECTION, SOLUTION INTRAMUSCULAR; INTRAVENOUS at 16:54

## 2022-06-26 RX ADMIN — PANTOPRAZOLE SODIUM 40 MG: 40 INJECTION, POWDER, FOR SOLUTION INTRAVENOUS at 08:13

## 2022-06-26 RX ADMIN — METOPROLOL TARTRATE 5 MG: 5 INJECTION INTRAVENOUS at 19:21

## 2022-06-26 RX ADMIN — POTASSIUM CHLORIDE, DEXTROSE MONOHYDRATE AND SODIUM CHLORIDE 150 ML/HR: 300; 5; 450 INJECTION, SOLUTION INTRAVENOUS at 02:11

## 2022-06-26 RX ADMIN — LISINOPRIL 20 MG: 20 TABLET ORAL at 20:39

## 2022-06-26 RX ADMIN — CHLORHEXIDINE GLUCONATE 15 ML: 1.2 SOLUTION ORAL at 08:13

## 2022-06-26 RX ADMIN — CEFEPIME HYDROCHLORIDE 2 G: 2 INJECTION, POWDER, FOR SOLUTION INTRAVENOUS at 00:06

## 2022-06-26 RX ADMIN — MIDAZOLAM 10 MG/HR: 5 INJECTION INTRAMUSCULAR; INTRAVENOUS at 10:08

## 2022-06-26 RX ADMIN — METOPROLOL TARTRATE 25 MG: 25 TABLET, FILM COATED ORAL at 20:39

## 2022-06-26 RX ADMIN — BUMETANIDE 2 MG: 0.25 INJECTION, SOLUTION INTRAMUSCULAR; INTRAVENOUS at 11:16

## 2022-06-26 RX ADMIN — MIDAZOLAM 7 MG/HR: 5 INJECTION INTRAMUSCULAR; INTRAVENOUS at 02:51

## 2022-06-26 RX ADMIN — MAGNESIUM SULFATE HEPTAHYDRATE 2 G: 40 INJECTION, SOLUTION INTRAVENOUS at 06:28

## 2022-06-26 RX ADMIN — METOPROLOL TARTRATE 5 MG: 1 INJECTION, SOLUTION INTRAVENOUS at 17:48

## 2022-06-26 NOTE — ANESTHESIA PROCEDURE NOTES
Central Line      Patient reassessed immediately prior to procedure    Patient location during procedure: ICU  Start time: 6/25/2022 10:00 PM  Stop Time:6/25/2022 10:15 PM  Staff  Anesthesiologist: Myesha Sharif DO  Preanesthetic Checklist  Completed: patient identified, IV checked, site marked, risks and benefits discussed, surgical consent, monitors and equipment checked, pre-op evaluation and timeout performed  Central Line Prep  Sterile Tech:gloves, cap, gown, mask and sterile barriers  Patient monitoring: blood pressure monitoring, continuous pulse oximetry and EKG  Central Line Procedure  Laterality:left  Location:internal jugular  Catheter Type:triple lumen  Catheter Size:7 Fr  Guidance:ultrasound guided  PROCEDURE NOTE/ULTRASOUND INTERPRETATION.  Using ultrasound guidance the potential vascular sites for insertion of the catheter were visualized to determine the patency of the vessel to be used for vascular access.  After selecting the appropriate site for insertion, the needle was visualized under ultrasound being inserted into the internal jugular vein, followed by ultrasound confirmation of wire and catheter placement. There were no abnormalities seen on ultrasound; an image was taken; and the patient tolerated the procedure with no complications. Images: still images not obtained  Assessment  Post procedure:biopatch applied, line sutured and occlusive dressing applied  Assessement:blood return through all ports, free fluid flow and chest x-ray ordered  Complications:no  Patient Tolerance:patient tolerated the procedure well with no apparent complications  Additional Notes  U/S used throughout and needle seen throughout  No complications

## 2022-06-27 PROBLEM — R65.20: Status: RESOLVED | Noted: 2022-06-26 | Resolved: 2022-06-27

## 2022-06-27 PROBLEM — E11.00 HYPEROSMOLAR HYPERGLYCEMIC STATE (HHS) (HCC): Status: RESOLVED | Noted: 2022-06-25 | Resolved: 2022-06-27

## 2022-06-27 LAB
ALBUMIN SERPL-MCNC: 3.1 G/DL (ref 3.5–5.2)
ALBUMIN/GLOB SERPL: 0.9 G/DL
ALP SERPL-CCNC: 146 U/L (ref 39–117)
ALT SERPL W P-5'-P-CCNC: <5 U/L (ref 1–33)
ANION GAP SERPL CALCULATED.3IONS-SCNC: 14 MMOL/L (ref 5–15)
ARTERIAL PATENCY WRIST A: ABNORMAL
ARTERIAL PATENCY WRIST A: ABNORMAL
AST SERPL-CCNC: 8 U/L (ref 1–32)
ATMOSPHERIC PRESS: 751 MMHG
ATMOSPHERIC PRESS: 753 MMHG
BASE EXCESS BLDA CALC-SCNC: 3.1 MMOL/L (ref 0–2)
BASE EXCESS BLDA CALC-SCNC: 5.7 MMOL/L (ref 0–2)
BASOPHILS # BLD AUTO: 0.06 10*3/MM3 (ref 0–0.2)
BASOPHILS NFR BLD AUTO: 0.6 % (ref 0–1.5)
BDY SITE: ABNORMAL
BDY SITE: ABNORMAL
BH BB BLOOD EXPIRATION DATE: NORMAL
BH BB BLOOD EXPIRATION DATE: NORMAL
BH BB BLOOD TYPE BARCODE: 5100
BH BB BLOOD TYPE BARCODE: NORMAL
BH BB DISPENSE STATUS: NORMAL
BH BB DISPENSE STATUS: NORMAL
BH BB PRODUCT CODE: NORMAL
BH BB PRODUCT CODE: NORMAL
BH BB UNIT NUMBER: NORMAL
BH BB UNIT NUMBER: NORMAL
BILIRUB SERPL-MCNC: 0.4 MG/DL (ref 0–1.2)
BUN SERPL-MCNC: 54 MG/DL (ref 8–23)
BUN/CREAT SERPL: 15 (ref 7–25)
CALCIUM SPEC-SCNC: 8.7 MG/DL (ref 8.6–10.5)
CALCIUM SPEC-SCNC: 9 MG/DL (ref 8.6–10.5)
CHLORIDE SERPL-SCNC: 93 MMOL/L (ref 98–107)
CO2 SERPL-SCNC: 25 MMOL/L (ref 22–29)
CREAT SERPL-MCNC: 3.61 MG/DL (ref 0.57–1)
CROSSMATCH INTERPRETATION: NORMAL
CROSSMATCH INTERPRETATION: NORMAL
DEPRECATED RDW RBC AUTO: 50 FL (ref 37–54)
EGFRCR SERPLBLD CKD-EPI 2021: 13.6 ML/MIN/1.73
EOSINOPHIL # BLD AUTO: 0.29 10*3/MM3 (ref 0–0.4)
EOSINOPHIL NFR BLD AUTO: 2.7 % (ref 0.3–6.2)
ERYTHROCYTE [DISTWIDTH] IN BLOOD BY AUTOMATED COUNT: 17.6 % (ref 12.3–15.4)
GLOBULIN UR ELPH-MCNC: 3.5 GM/DL
GLUCOSE BLDC GLUCOMTR-MCNC: 168 MG/DL (ref 70–130)
GLUCOSE BLDC GLUCOMTR-MCNC: 212 MG/DL (ref 70–130)
GLUCOSE BLDC GLUCOMTR-MCNC: 431 MG/DL (ref 70–130)
GLUCOSE BLDC GLUCOMTR-MCNC: 512 MG/DL (ref 70–130)
GLUCOSE BLDC GLUCOMTR-MCNC: 548 MG/DL (ref 70–130)
GLUCOSE BLDC GLUCOMTR-MCNC: 556 MG/DL (ref 70–130)
GLUCOSE SERPL-MCNC: 263 MG/DL (ref 65–99)
HBV SURFACE AG SERPL QL IA: NORMAL
HCO3 BLDA-SCNC: 28.2 MMOL/L (ref 20–26)
HCO3 BLDA-SCNC: 30.2 MMOL/L (ref 20–26)
HCT VFR BLD AUTO: 25 % (ref 34–46.6)
HGB BLD-MCNC: 8.1 G/DL (ref 12–15.9)
HOLD SPECIMEN: NORMAL
HOLD SPECIMEN: NORMAL
IMM GRANULOCYTES # BLD AUTO: 0.07 10*3/MM3 (ref 0–0.05)
IMM GRANULOCYTES NFR BLD AUTO: 0.6 % (ref 0–0.5)
INHALED O2 CONCENTRATION: 30 %
INHALED O2 CONCENTRATION: 30 %
LYMPHOCYTES # BLD AUTO: 1.31 10*3/MM3 (ref 0.7–3.1)
LYMPHOCYTES NFR BLD AUTO: 12.2 % (ref 19.6–45.3)
Lab: ABNORMAL
Lab: ABNORMAL
MAGNESIUM SERPL-MCNC: 1.8 MG/DL (ref 1.6–2.4)
MAGNESIUM SERPL-MCNC: 2 MG/DL (ref 1.6–2.4)
MAGNESIUM SERPL-MCNC: 2.1 MG/DL (ref 1.6–2.4)
MCH RBC QN AUTO: 26.8 PG (ref 26.6–33)
MCHC RBC AUTO-ENTMCNC: 32.4 G/DL (ref 31.5–35.7)
MCV RBC AUTO: 82.8 FL (ref 79–97)
MODALITY: ABNORMAL
MODALITY: ABNORMAL
MONOCYTES # BLD AUTO: 0.71 10*3/MM3 (ref 0.1–0.9)
MONOCYTES NFR BLD AUTO: 6.6 % (ref 5–12)
NEUTROPHILS NFR BLD AUTO: 77.3 % (ref 42.7–76)
NEUTROPHILS NFR BLD AUTO: 8.33 10*3/MM3 (ref 1.7–7)
NRBC BLD AUTO-RTO: 0 /100 WBC (ref 0–0.2)
PCO2 BLDA: 42.6 MM HG (ref 35–45)
PCO2 BLDA: 45.3 MM HG (ref 35–45)
PEEP RESPIRATORY: 5 CM[H2O]
PEEP RESPIRATORY: 5 CM[H2O]
PH BLDA: 7.4 PH UNITS (ref 7.35–7.45)
PH BLDA: 7.46 PH UNITS (ref 7.35–7.45)
PHOSPHATE SERPL-MCNC: 4.1 MG/DL (ref 2.5–4.5)
PHOSPHATE SERPL-MCNC: 4.3 MG/DL (ref 2.5–4.5)
PLATELET # BLD AUTO: 254 10*3/MM3 (ref 140–450)
PMV BLD AUTO: 8.8 FL (ref 6–12)
PO2 BLDA: 82.3 MM HG (ref 83–108)
PO2 BLDA: 87.1 MM HG (ref 83–108)
POTASSIUM SERPL-SCNC: 3.4 MMOL/L (ref 3.5–5.2)
POTASSIUM SERPL-SCNC: 3.8 MMOL/L (ref 3.5–5.2)
PROT SERPL-MCNC: 6.6 G/DL (ref 6–8.5)
PSV: 8 CMH2O
RBC # BLD AUTO: 3.02 10*6/MM3 (ref 3.77–5.28)
SAO2 % BLDCOA: 97.1 % (ref 94–99)
SAO2 % BLDCOA: 98.1 % (ref 94–99)
SET MECH RESP RATE: 26
SODIUM SERPL-SCNC: 132 MMOL/L (ref 136–145)
TOTAL RATE: 26 BREATHS/MINUTE
UNIT  ABO: NORMAL
UNIT  ABO: NORMAL
UNIT  RH: NORMAL
UNIT  RH: NORMAL
VANCOMYCIN SERPL-MCNC: 18 MCG/ML (ref 5–40)
VENTILATOR MODE: ABNORMAL
VENTILATOR MODE: AC
VT ON VENT VENT: 400 ML
WBC NRBC COR # BLD: 10.77 10*3/MM3 (ref 3.4–10.8)

## 2022-06-27 PROCEDURE — 83735 ASSAY OF MAGNESIUM: CPT | Performed by: STUDENT IN AN ORGANIZED HEALTH CARE EDUCATION/TRAINING PROGRAM

## 2022-06-27 PROCEDURE — 25010000002 EPOETIN ALFA-EPBX 10000 UNIT/ML SOLUTION: Performed by: INTERNAL MEDICINE

## 2022-06-27 PROCEDURE — 25010000002 HEPARIN (PORCINE) PER 1000 UNITS: Performed by: STUDENT IN AN ORGANIZED HEALTH CARE EDUCATION/TRAINING PROGRAM

## 2022-06-27 PROCEDURE — 94799 UNLISTED PULMONARY SVC/PX: CPT

## 2022-06-27 PROCEDURE — 93010 ELECTROCARDIOGRAM REPORT: CPT | Performed by: INTERNAL MEDICINE

## 2022-06-27 PROCEDURE — 82310 ASSAY OF CALCIUM: CPT | Performed by: STUDENT IN AN ORGANIZED HEALTH CARE EDUCATION/TRAINING PROGRAM

## 2022-06-27 PROCEDURE — 0 POTASSIUM CHLORIDE 10 MEQ/100ML SOLUTION: Performed by: STUDENT IN AN ORGANIZED HEALTH CARE EDUCATION/TRAINING PROGRAM

## 2022-06-27 PROCEDURE — 63710000001 INSULIN DETEMIR PER 5 UNITS: Performed by: STUDENT IN AN ORGANIZED HEALTH CARE EDUCATION/TRAINING PROGRAM

## 2022-06-27 PROCEDURE — 84100 ASSAY OF PHOSPHORUS: CPT | Performed by: STUDENT IN AN ORGANIZED HEALTH CARE EDUCATION/TRAINING PROGRAM

## 2022-06-27 PROCEDURE — 5A1D70Z PERFORMANCE OF URINARY FILTRATION, INTERMITTENT, LESS THAN 6 HOURS PER DAY: ICD-10-PCS | Performed by: INTERNAL MEDICINE

## 2022-06-27 PROCEDURE — 82803 BLOOD GASES ANY COMBINATION: CPT

## 2022-06-27 PROCEDURE — 87340 HEPATITIS B SURFACE AG IA: CPT | Performed by: INTERNAL MEDICINE

## 2022-06-27 PROCEDURE — 25010000002 MAGNESIUM SULFATE 2 GM/50ML SOLUTION: Performed by: STUDENT IN AN ORGANIZED HEALTH CARE EDUCATION/TRAINING PROGRAM

## 2022-06-27 PROCEDURE — 25010000002 MIDAZOLAM 50 MG/10ML SOLUTION: Performed by: STUDENT IN AN ORGANIZED HEALTH CARE EDUCATION/TRAINING PROGRAM

## 2022-06-27 PROCEDURE — 84132 ASSAY OF SERUM POTASSIUM: CPT | Performed by: STUDENT IN AN ORGANIZED HEALTH CARE EDUCATION/TRAINING PROGRAM

## 2022-06-27 PROCEDURE — 94003 VENT MGMT INPAT SUBQ DAY: CPT

## 2022-06-27 PROCEDURE — 63710000001 INSULIN ASPART PER 5 UNITS: Performed by: STUDENT IN AN ORGANIZED HEALTH CARE EDUCATION/TRAINING PROGRAM

## 2022-06-27 PROCEDURE — 99291 CRITICAL CARE FIRST HOUR: CPT | Performed by: STUDENT IN AN ORGANIZED HEALTH CARE EDUCATION/TRAINING PROGRAM

## 2022-06-27 PROCEDURE — 80202 ASSAY OF VANCOMYCIN: CPT | Performed by: STUDENT IN AN ORGANIZED HEALTH CARE EDUCATION/TRAINING PROGRAM

## 2022-06-27 PROCEDURE — 94761 N-INVAS EAR/PLS OXIMETRY MLT: CPT

## 2022-06-27 PROCEDURE — 25010000002 VANCOMYCIN 10 G RECONSTITUTED SOLUTION: Performed by: STUDENT IN AN ORGANIZED HEALTH CARE EDUCATION/TRAINING PROGRAM

## 2022-06-27 PROCEDURE — 25010000002 HEPARIN (PORCINE) PER 1000 UNITS: Performed by: INTERNAL MEDICINE

## 2022-06-27 PROCEDURE — 36600 WITHDRAWAL OF ARTERIAL BLOOD: CPT

## 2022-06-27 PROCEDURE — 25010000002 HYDRALAZINE PER 20 MG: Performed by: STUDENT IN AN ORGANIZED HEALTH CARE EDUCATION/TRAINING PROGRAM

## 2022-06-27 PROCEDURE — 93005 ELECTROCARDIOGRAM TRACING: CPT | Performed by: STUDENT IN AN ORGANIZED HEALTH CARE EDUCATION/TRAINING PROGRAM

## 2022-06-27 PROCEDURE — 82962 GLUCOSE BLOOD TEST: CPT

## 2022-06-27 PROCEDURE — 85025 COMPLETE CBC W/AUTO DIFF WBC: CPT | Performed by: STUDENT IN AN ORGANIZED HEALTH CARE EDUCATION/TRAINING PROGRAM

## 2022-06-27 PROCEDURE — 36415 COLL VENOUS BLD VENIPUNCTURE: CPT | Performed by: STUDENT IN AN ORGANIZED HEALTH CARE EDUCATION/TRAINING PROGRAM

## 2022-06-27 PROCEDURE — 80053 COMPREHEN METABOLIC PANEL: CPT | Performed by: STUDENT IN AN ORGANIZED HEALTH CARE EDUCATION/TRAINING PROGRAM

## 2022-06-27 PROCEDURE — 25010000002 MORPHINE PER 10 MG: Performed by: STUDENT IN AN ORGANIZED HEALTH CARE EDUCATION/TRAINING PROGRAM

## 2022-06-27 RX ORDER — POTASSIUM CHLORIDE 7.45 MG/ML
10 INJECTION INTRAVENOUS
Status: COMPLETED | OUTPATIENT
Start: 2022-06-27 | End: 2022-06-28

## 2022-06-27 RX ORDER — HEPARIN SODIUM 1000 [USP'U]/ML
4000 INJECTION, SOLUTION INTRAVENOUS; SUBCUTANEOUS AS NEEDED
Status: DISCONTINUED | OUTPATIENT
Start: 2022-06-27 | End: 2022-06-29

## 2022-06-27 RX ORDER — HEPARIN SODIUM 5000 [USP'U]/ML
5000 INJECTION, SOLUTION INTRAVENOUS; SUBCUTANEOUS EVERY 12 HOURS SCHEDULED
Status: DISCONTINUED | OUTPATIENT
Start: 2022-06-27 | End: 2022-07-01

## 2022-06-27 RX ORDER — HYDRALAZINE HYDROCHLORIDE 20 MG/ML
10 INJECTION INTRAMUSCULAR; INTRAVENOUS EVERY 6 HOURS PRN
Status: DISCONTINUED | OUTPATIENT
Start: 2022-06-27 | End: 2022-07-07 | Stop reason: HOSPADM

## 2022-06-27 RX ORDER — LISINOPRIL 10 MG/1
10 TABLET ORAL
Status: DISCONTINUED | OUTPATIENT
Start: 2022-06-28 | End: 2022-06-27

## 2022-06-27 RX ORDER — ENOXAPARIN SODIUM 100 MG/ML
40 INJECTION SUBCUTANEOUS EVERY 24 HOURS
Status: DISCONTINUED | OUTPATIENT
Start: 2022-06-27 | End: 2022-06-27

## 2022-06-27 RX ORDER — GABAPENTIN 300 MG/1
300 CAPSULE ORAL DAILY
Status: DISCONTINUED | OUTPATIENT
Start: 2022-06-27 | End: 2022-07-01

## 2022-06-27 RX ORDER — ALBUMIN (HUMAN) 12.5 G/50ML
25 SOLUTION INTRAVENOUS AS NEEDED
Status: ACTIVE | OUTPATIENT
Start: 2022-06-27 | End: 2022-06-28

## 2022-06-27 RX ORDER — LISINOPRIL 10 MG/1
10 TABLET ORAL
Status: DISCONTINUED | OUTPATIENT
Start: 2022-06-27 | End: 2022-06-28

## 2022-06-27 RX ADMIN — Medication 10 ML: at 08:14

## 2022-06-27 RX ADMIN — INSULIN ASPART 3 UNITS: 100 INJECTION, SOLUTION INTRAVENOUS; SUBCUTANEOUS at 17:02

## 2022-06-27 RX ADMIN — Medication 10 ML: at 08:17

## 2022-06-27 RX ADMIN — LISINOPRIL 20 MG: 20 TABLET ORAL at 08:15

## 2022-06-27 RX ADMIN — CHLORHEXIDINE GLUCONATE 15 ML: 1.2 SOLUTION ORAL at 08:15

## 2022-06-27 RX ADMIN — POTASSIUM CHLORIDE 10 MEQ: 7.46 INJECTION, SOLUTION INTRAVENOUS at 23:09

## 2022-06-27 RX ADMIN — DOCUSATE SODIUM 50 MG AND SENNOSIDES 8.6 MG 2 TABLET: 8.6; 5 TABLET, FILM COATED ORAL at 20:07

## 2022-06-27 RX ADMIN — INSULIN ASPART 4 UNITS: 100 INJECTION, SOLUTION INTRAVENOUS; SUBCUTANEOUS at 06:30

## 2022-06-27 RX ADMIN — INSULIN DETEMIR 10 UNITS: 100 INJECTION, SOLUTION SUBCUTANEOUS at 08:12

## 2022-06-27 RX ADMIN — INSULIN ASPART 2 UNITS: 100 INJECTION, SOLUTION INTRAVENOUS; SUBCUTANEOUS at 10:57

## 2022-06-27 RX ADMIN — Medication 10 ML: at 20:07

## 2022-06-27 RX ADMIN — BUMETANIDE 2 MG: 0.25 INJECTION, SOLUTION INTRAMUSCULAR; INTRAVENOUS at 13:11

## 2022-06-27 RX ADMIN — HEPARIN SODIUM 4000 UNITS: 1000 INJECTION INTRAVENOUS; SUBCUTANEOUS at 12:01

## 2022-06-27 RX ADMIN — MAGNESIUM SULFATE HEPTAHYDRATE 2 G: 40 INJECTION, SOLUTION INTRAVENOUS at 21:04

## 2022-06-27 RX ADMIN — METOPROLOL TARTRATE 25 MG: 25 TABLET, FILM COATED ORAL at 20:07

## 2022-06-27 RX ADMIN — EPOETIN ALFA-EPBX 10000 UNITS: 10000 INJECTION, SOLUTION INTRAVENOUS; SUBCUTANEOUS at 11:41

## 2022-06-27 RX ADMIN — METOPROLOL TARTRATE 25 MG: 25 TABLET, FILM COATED ORAL at 08:15

## 2022-06-27 RX ADMIN — HEPARIN SODIUM 5000 UNITS: 5000 INJECTION, SOLUTION INTRAVENOUS; SUBCUTANEOUS at 20:06

## 2022-06-27 RX ADMIN — PANTOPRAZOLE SODIUM 40 MG: 40 INJECTION, POWDER, FOR SOLUTION INTRAVENOUS at 08:14

## 2022-06-27 RX ADMIN — VANCOMYCIN HYDROCHLORIDE 1250 MG: 10 INJECTION, POWDER, LYOPHILIZED, FOR SOLUTION INTRAVENOUS at 16:12

## 2022-06-27 RX ADMIN — LISINOPRIL 10 MG: 10 TABLET ORAL at 18:49

## 2022-06-27 RX ADMIN — INSULIN DETEMIR 10 UNITS: 100 INJECTION, SOLUTION SUBCUTANEOUS at 20:39

## 2022-06-27 RX ADMIN — HYDRALAZINE HYDROCHLORIDE 10 MG: 20 INJECTION INTRAMUSCULAR; INTRAVENOUS at 15:27

## 2022-06-27 RX ADMIN — GABAPENTIN 300 MG: 300 CAPSULE ORAL at 18:49

## 2022-06-27 RX ADMIN — MORPHINE SULFATE 1 MG: 2 INJECTION, SOLUTION INTRAMUSCULAR; INTRAVENOUS at 15:10

## 2022-06-27 RX ADMIN — MIDAZOLAM 10 MG/HR: 5 INJECTION INTRAMUSCULAR; INTRAVENOUS at 05:33

## 2022-06-27 RX ADMIN — CHLORHEXIDINE GLUCONATE 15 ML: 1.2 SOLUTION ORAL at 20:07

## 2022-06-27 RX ADMIN — DOCUSATE SODIUM 50 MG AND SENNOSIDES 8.6 MG 2 TABLET: 8.6; 5 TABLET, FILM COATED ORAL at 08:15

## 2022-06-27 RX ADMIN — MORPHINE SULFATE 1 MG: 2 INJECTION, SOLUTION INTRAMUSCULAR; INTRAVENOUS at 20:06

## 2022-06-28 ENCOUNTER — APPOINTMENT (OUTPATIENT)
Dept: GENERAL RADIOLOGY | Facility: HOSPITAL | Age: 62
End: 2022-06-28

## 2022-06-28 PROBLEM — E83.42 HYPOMAGNESEMIA: Status: ACTIVE | Noted: 2022-06-28

## 2022-06-28 LAB
ALBUMIN SERPL-MCNC: 2.9 G/DL (ref 3.5–5.2)
ALBUMIN/GLOB SERPL: 0.7 G/DL
ALP SERPL-CCNC: 150 U/L (ref 39–117)
ALT SERPL W P-5'-P-CCNC: <5 U/L (ref 1–33)
ANION GAP SERPL CALCULATED.3IONS-SCNC: 11 MMOL/L (ref 5–15)
ARTERIAL PATENCY WRIST A: ABNORMAL
AST SERPL-CCNC: 6 U/L (ref 1–32)
ATMOSPHERIC PRESS: 752 MMHG
BASE EXCESS BLDA CALC-SCNC: 4.5 MMOL/L (ref 0–2)
BASOPHILS # BLD AUTO: 0.07 10*3/MM3 (ref 0–0.2)
BASOPHILS NFR BLD AUTO: 0.6 % (ref 0–1.5)
BDY SITE: ABNORMAL
BILIRUB SERPL-MCNC: 0.5 MG/DL (ref 0–1.2)
BUN SERPL-MCNC: 24 MG/DL (ref 8–23)
BUN/CREAT SERPL: 10.5 (ref 7–25)
CALCIUM SPEC-SCNC: 9.1 MG/DL (ref 8.6–10.5)
CHLORIDE SERPL-SCNC: 95 MMOL/L (ref 98–107)
CO2 SERPL-SCNC: 27 MMOL/L (ref 22–29)
CREAT SERPL-MCNC: 2.29 MG/DL (ref 0.57–1)
CRP SERPL-MCNC: 27.39 MG/DL (ref 0–0.5)
DEPRECATED RDW RBC AUTO: 51.4 FL (ref 37–54)
EGFRCR SERPLBLD CKD-EPI 2021: 23.5 ML/MIN/1.73
EOSINOPHIL # BLD AUTO: 0.24 10*3/MM3 (ref 0–0.4)
EOSINOPHIL NFR BLD AUTO: 2.1 % (ref 0.3–6.2)
ERYTHROCYTE [DISTWIDTH] IN BLOOD BY AUTOMATED COUNT: 17.4 % (ref 12.3–15.4)
GLOBULIN UR ELPH-MCNC: 4.2 GM/DL
GLUCOSE BLDC GLUCOMTR-MCNC: 211 MG/DL (ref 70–130)
GLUCOSE BLDC GLUCOMTR-MCNC: 220 MG/DL (ref 70–130)
GLUCOSE BLDC GLUCOMTR-MCNC: 251 MG/DL (ref 70–130)
GLUCOSE BLDC GLUCOMTR-MCNC: 258 MG/DL (ref 70–130)
GLUCOSE SERPL-MCNC: 249 MG/DL (ref 65–99)
HCO3 BLDA-SCNC: 29.7 MMOL/L (ref 20–26)
HCT VFR BLD AUTO: 26.8 % (ref 34–46.6)
HGB BLD-MCNC: 8.7 G/DL (ref 12–15.9)
IMM GRANULOCYTES # BLD AUTO: 0.09 10*3/MM3 (ref 0–0.05)
IMM GRANULOCYTES NFR BLD AUTO: 0.8 % (ref 0–0.5)
INHALED O2 CONCENTRATION: 30 %
LYMPHOCYTES # BLD AUTO: 1.37 10*3/MM3 (ref 0.7–3.1)
LYMPHOCYTES NFR BLD AUTO: 11.8 % (ref 19.6–45.3)
Lab: ABNORMAL
MAGNESIUM SERPL-MCNC: 2.2 MG/DL (ref 1.6–2.4)
MCH RBC QN AUTO: 26.9 PG (ref 26.6–33)
MCHC RBC AUTO-ENTMCNC: 32.5 G/DL (ref 31.5–35.7)
MCV RBC AUTO: 83 FL (ref 79–97)
MODALITY: ABNORMAL
MONOCYTES # BLD AUTO: 0.69 10*3/MM3 (ref 0.1–0.9)
MONOCYTES NFR BLD AUTO: 6 % (ref 5–12)
NEUTROPHILS NFR BLD AUTO: 78.7 % (ref 42.7–76)
NEUTROPHILS NFR BLD AUTO: 9.13 10*3/MM3 (ref 1.7–7)
NRBC BLD AUTO-RTO: 0 /100 WBC (ref 0–0.2)
PCO2 BLDA: 46.4 MM HG (ref 35–45)
PEEP RESPIRATORY: 5 CM[H2O]
PH BLDA: 7.41 PH UNITS (ref 7.35–7.45)
PLATELET # BLD AUTO: 245 10*3/MM3 (ref 140–450)
PMV BLD AUTO: 8.3 FL (ref 6–12)
PO2 BLDA: 69.4 MM HG (ref 83–108)
POTASSIUM SERPL-SCNC: 3.9 MMOL/L (ref 3.5–5.2)
PROCALCITONIN SERPL-MCNC: 0.76 NG/ML (ref 0–0.25)
PROT SERPL-MCNC: 7.1 G/DL (ref 6–8.5)
PSV: 12 CMH2O
QT INTERVAL: 496 MS
QTC INTERVAL: 616 MS
RBC # BLD AUTO: 3.23 10*6/MM3 (ref 3.77–5.28)
SAO2 % BLDCOA: 95 % (ref 94–99)
SET MECH RESP RATE: 12
SODIUM SERPL-SCNC: 133 MMOL/L (ref 136–145)
TOTAL RATE: 24 BREATHS/MINUTE
VENTILATOR MODE: ABNORMAL
VT ON VENT VENT: 400 ML
WBC NRBC COR # BLD: 11.59 10*3/MM3 (ref 3.4–10.8)

## 2022-06-28 PROCEDURE — 85025 COMPLETE CBC W/AUTO DIFF WBC: CPT | Performed by: STUDENT IN AN ORGANIZED HEALTH CARE EDUCATION/TRAINING PROGRAM

## 2022-06-28 PROCEDURE — 82803 BLOOD GASES ANY COMBINATION: CPT

## 2022-06-28 PROCEDURE — 84145 PROCALCITONIN (PCT): CPT | Performed by: STUDENT IN AN ORGANIZED HEALTH CARE EDUCATION/TRAINING PROGRAM

## 2022-06-28 PROCEDURE — 25010000002 CEFEPIME PER 500 MG: Performed by: STUDENT IN AN ORGANIZED HEALTH CARE EDUCATION/TRAINING PROGRAM

## 2022-06-28 PROCEDURE — 63710000001 INSULIN ASPART PER 5 UNITS: Performed by: STUDENT IN AN ORGANIZED HEALTH CARE EDUCATION/TRAINING PROGRAM

## 2022-06-28 PROCEDURE — 94799 UNLISTED PULMONARY SVC/PX: CPT

## 2022-06-28 PROCEDURE — 82962 GLUCOSE BLOOD TEST: CPT

## 2022-06-28 PROCEDURE — 94003 VENT MGMT INPAT SUBQ DAY: CPT

## 2022-06-28 PROCEDURE — 36600 WITHDRAWAL OF ARTERIAL BLOOD: CPT

## 2022-06-28 PROCEDURE — 25010000002 HEPARIN (PORCINE) PER 1000 UNITS: Performed by: STUDENT IN AN ORGANIZED HEALTH CARE EDUCATION/TRAINING PROGRAM

## 2022-06-28 PROCEDURE — 99222 1ST HOSP IP/OBS MODERATE 55: CPT | Performed by: INTERNAL MEDICINE

## 2022-06-28 PROCEDURE — 0 POTASSIUM CHLORIDE 10 MEQ/100ML SOLUTION: Performed by: STUDENT IN AN ORGANIZED HEALTH CARE EDUCATION/TRAINING PROGRAM

## 2022-06-28 PROCEDURE — 25010000002 HYDRALAZINE PER 20 MG: Performed by: STUDENT IN AN ORGANIZED HEALTH CARE EDUCATION/TRAINING PROGRAM

## 2022-06-28 PROCEDURE — 93010 ELECTROCARDIOGRAM REPORT: CPT | Performed by: INTERNAL MEDICINE

## 2022-06-28 PROCEDURE — 99291 CRITICAL CARE FIRST HOUR: CPT | Performed by: STUDENT IN AN ORGANIZED HEALTH CARE EDUCATION/TRAINING PROGRAM

## 2022-06-28 PROCEDURE — 83735 ASSAY OF MAGNESIUM: CPT | Performed by: STUDENT IN AN ORGANIZED HEALTH CARE EDUCATION/TRAINING PROGRAM

## 2022-06-28 PROCEDURE — 93005 ELECTROCARDIOGRAM TRACING: CPT | Performed by: STUDENT IN AN ORGANIZED HEALTH CARE EDUCATION/TRAINING PROGRAM

## 2022-06-28 PROCEDURE — 63710000001 INSULIN DETEMIR PER 5 UNITS: Performed by: STUDENT IN AN ORGANIZED HEALTH CARE EDUCATION/TRAINING PROGRAM

## 2022-06-28 PROCEDURE — 71045 X-RAY EXAM CHEST 1 VIEW: CPT

## 2022-06-28 PROCEDURE — 25010000002 MORPHINE PER 10 MG: Performed by: STUDENT IN AN ORGANIZED HEALTH CARE EDUCATION/TRAINING PROGRAM

## 2022-06-28 PROCEDURE — 25010000002 MIDAZOLAM 50 MG/10ML SOLUTION: Performed by: STUDENT IN AN ORGANIZED HEALTH CARE EDUCATION/TRAINING PROGRAM

## 2022-06-28 PROCEDURE — 80053 COMPREHEN METABOLIC PANEL: CPT | Performed by: STUDENT IN AN ORGANIZED HEALTH CARE EDUCATION/TRAINING PROGRAM

## 2022-06-28 PROCEDURE — 86140 C-REACTIVE PROTEIN: CPT | Performed by: STUDENT IN AN ORGANIZED HEALTH CARE EDUCATION/TRAINING PROGRAM

## 2022-06-28 PROCEDURE — 94760 N-INVAS EAR/PLS OXIMETRY 1: CPT

## 2022-06-28 RX ORDER — ISOSORBIDE MONONITRATE 30 MG/1
30 TABLET, EXTENDED RELEASE ORAL
Status: DISCONTINUED | OUTPATIENT
Start: 2022-06-28 | End: 2022-07-07 | Stop reason: HOSPADM

## 2022-06-28 RX ORDER — DEXMEDETOMIDINE HYDROCHLORIDE 4 UG/ML
.2-1.5 INJECTION, SOLUTION INTRAVENOUS
Status: DISCONTINUED | OUTPATIENT
Start: 2022-06-29 | End: 2022-06-28

## 2022-06-28 RX ORDER — DEXMEDETOMIDINE HYDROCHLORIDE 4 UG/ML
.2-1.5 INJECTION, SOLUTION INTRAVENOUS
Status: DISCONTINUED | OUTPATIENT
Start: 2022-06-28 | End: 2022-07-03

## 2022-06-28 RX ORDER — LISINOPRIL 20 MG/1
20 TABLET ORAL
Status: DISCONTINUED | OUTPATIENT
Start: 2022-06-28 | End: 2022-06-30

## 2022-06-28 RX ADMIN — POTASSIUM CHLORIDE 10 MEQ: 7.46 INJECTION, SOLUTION INTRAVENOUS at 00:19

## 2022-06-28 RX ADMIN — HEPARIN SODIUM 5000 UNITS: 5000 INJECTION, SOLUTION INTRAVENOUS; SUBCUTANEOUS at 09:32

## 2022-06-28 RX ADMIN — PANTOPRAZOLE SODIUM 40 MG: 40 INJECTION, POWDER, FOR SOLUTION INTRAVENOUS at 09:33

## 2022-06-28 RX ADMIN — POTASSIUM CHLORIDE 10 MEQ: 7.46 INJECTION, SOLUTION INTRAVENOUS at 01:18

## 2022-06-28 RX ADMIN — DOCUSATE SODIUM 50 MG AND SENNOSIDES 8.6 MG 2 TABLET: 8.6; 5 TABLET, FILM COATED ORAL at 09:33

## 2022-06-28 RX ADMIN — INSULIN ASPART 4 UNITS: 100 INJECTION, SOLUTION INTRAVENOUS; SUBCUTANEOUS at 06:38

## 2022-06-28 RX ADMIN — DOCUSATE SODIUM 50 MG AND SENNOSIDES 8.6 MG 2 TABLET: 8.6; 5 TABLET, FILM COATED ORAL at 20:44

## 2022-06-28 RX ADMIN — INSULIN ASPART 3 UNITS: 100 INJECTION, SOLUTION INTRAVENOUS; SUBCUTANEOUS at 17:45

## 2022-06-28 RX ADMIN — CHLORHEXIDINE GLUCONATE 15 ML: 1.2 SOLUTION ORAL at 09:32

## 2022-06-28 RX ADMIN — INSULIN DETEMIR 10 UNITS: 100 INJECTION, SOLUTION SUBCUTANEOUS at 09:32

## 2022-06-28 RX ADMIN — DEXMEDETOMIDINE HYDROCHLORIDE 0.2 MCG/KG/HR: 100 INJECTION, SOLUTION INTRAVENOUS at 11:52

## 2022-06-28 RX ADMIN — INSULIN ASPART 4 UNITS: 100 INJECTION, SOLUTION INTRAVENOUS; SUBCUTANEOUS at 12:04

## 2022-06-28 RX ADMIN — BUMETANIDE 2 MG: 0.25 INJECTION, SOLUTION INTRAMUSCULAR; INTRAVENOUS at 00:23

## 2022-06-28 RX ADMIN — POTASSIUM CHLORIDE 10 MEQ: 7.46 INJECTION, SOLUTION INTRAVENOUS at 02:18

## 2022-06-28 RX ADMIN — INSULIN DETEMIR 10 UNITS: 100 INJECTION, SOLUTION SUBCUTANEOUS at 20:50

## 2022-06-28 RX ADMIN — METOPROLOL TARTRATE 25 MG: 25 TABLET, FILM COATED ORAL at 20:44

## 2022-06-28 RX ADMIN — CHLORHEXIDINE GLUCONATE 15 ML: 1.2 SOLUTION ORAL at 20:44

## 2022-06-28 RX ADMIN — HEPARIN SODIUM 5000 UNITS: 5000 INJECTION, SOLUTION INTRAVENOUS; SUBCUTANEOUS at 20:44

## 2022-06-28 RX ADMIN — DEXMEDETOMIDINE HYDROCHLORIDE 0.3 MCG/KG/HR: 100 INJECTION, SOLUTION INTRAVENOUS at 22:49

## 2022-06-28 RX ADMIN — METOPROLOL TARTRATE 25 MG: 25 TABLET, FILM COATED ORAL at 09:49

## 2022-06-28 RX ADMIN — MIDAZOLAM 2 MG/HR: 5 INJECTION INTRAMUSCULAR; INTRAVENOUS at 02:15

## 2022-06-28 RX ADMIN — CEFEPIME HYDROCHLORIDE 2 G: 2 INJECTION, POWDER, FOR SOLUTION INTRAVENOUS at 00:23

## 2022-06-28 RX ADMIN — LISINOPRIL 20 MG: 20 TABLET ORAL at 09:33

## 2022-06-28 RX ADMIN — MORPHINE SULFATE 1 MG: 2 INJECTION, SOLUTION INTRAMUSCULAR; INTRAVENOUS at 10:28

## 2022-06-28 RX ADMIN — BUMETANIDE 2 MG: 0.25 INJECTION, SOLUTION INTRAMUSCULAR; INTRAVENOUS at 12:07

## 2022-06-28 RX ADMIN — HYDRALAZINE HYDROCHLORIDE 10 MG: 20 INJECTION INTRAMUSCULAR; INTRAVENOUS at 04:05

## 2022-06-28 RX ADMIN — GABAPENTIN 300 MG: 300 CAPSULE ORAL at 09:32

## 2022-06-28 RX ADMIN — Medication 10 ML: at 09:49

## 2022-06-28 RX ADMIN — Medication 10 ML: at 20:44

## 2022-06-29 ENCOUNTER — APPOINTMENT (OUTPATIENT)
Dept: GENERAL RADIOLOGY | Facility: HOSPITAL | Age: 62
End: 2022-06-29

## 2022-06-29 LAB
ALBUMIN SERPL-MCNC: 2.9 G/DL (ref 3.5–5.2)
ALBUMIN/GLOB SERPL: 0.7 G/DL
ALP SERPL-CCNC: 137 U/L (ref 39–117)
ALT SERPL W P-5'-P-CCNC: <5 U/L (ref 1–33)
ANION GAP SERPL CALCULATED.3IONS-SCNC: 14 MMOL/L (ref 5–15)
ARTERIAL PATENCY WRIST A: ABNORMAL
AST SERPL-CCNC: 7 U/L (ref 1–32)
ATMOSPHERIC PRESS: 753 MMHG
BASE EXCESS BLDA CALC-SCNC: 3.5 MMOL/L (ref 0–2)
BASOPHILS # BLD AUTO: 0.07 10*3/MM3 (ref 0–0.2)
BASOPHILS NFR BLD AUTO: 0.7 % (ref 0–1.5)
BDY SITE: ABNORMAL
BILIRUB SERPL-MCNC: 0.4 MG/DL (ref 0–1.2)
BUN SERPL-MCNC: 33 MG/DL (ref 8–23)
BUN/CREAT SERPL: 11.1 (ref 7–25)
CALCIUM SPEC-SCNC: 9 MG/DL (ref 8.6–10.5)
CHLORIDE SERPL-SCNC: 95 MMOL/L (ref 98–107)
CO2 SERPL-SCNC: 27 MMOL/L (ref 22–29)
CREAT SERPL-MCNC: 2.98 MG/DL (ref 0.57–1)
DEPRECATED RDW RBC AUTO: 53.4 FL (ref 37–54)
EGFRCR SERPLBLD CKD-EPI 2021: 17.1 ML/MIN/1.73
EOSINOPHIL # BLD AUTO: 0.24 10*3/MM3 (ref 0–0.4)
EOSINOPHIL NFR BLD AUTO: 2.3 % (ref 0.3–6.2)
ERYTHROCYTE [DISTWIDTH] IN BLOOD BY AUTOMATED COUNT: 17.2 % (ref 12.3–15.4)
GLOBULIN UR ELPH-MCNC: 4.3 GM/DL
GLUCOSE BLDC GLUCOMTR-MCNC: 147 MG/DL (ref 70–130)
GLUCOSE BLDC GLUCOMTR-MCNC: 201 MG/DL (ref 70–130)
GLUCOSE BLDC GLUCOMTR-MCNC: 208 MG/DL (ref 70–130)
GLUCOSE BLDC GLUCOMTR-MCNC: 215 MG/DL (ref 70–130)
GLUCOSE SERPL-MCNC: 226 MG/DL (ref 65–99)
HCO3 BLDA-SCNC: 28.3 MMOL/L (ref 20–26)
HCT VFR BLD AUTO: 27.7 % (ref 34–46.6)
HGB BLD-MCNC: 8.6 G/DL (ref 12–15.9)
IMM GRANULOCYTES # BLD AUTO: 0.15 10*3/MM3 (ref 0–0.05)
IMM GRANULOCYTES NFR BLD AUTO: 1.4 % (ref 0–0.5)
INHALED O2 CONCENTRATION: 30 %
LYMPHOCYTES # BLD AUTO: 1.62 10*3/MM3 (ref 0.7–3.1)
LYMPHOCYTES NFR BLD AUTO: 15.5 % (ref 19.6–45.3)
Lab: ABNORMAL
MCH RBC QN AUTO: 26.9 PG (ref 26.6–33)
MCHC RBC AUTO-ENTMCNC: 31 G/DL (ref 31.5–35.7)
MCV RBC AUTO: 86.6 FL (ref 79–97)
MODALITY: ABNORMAL
MONOCYTES # BLD AUTO: 0.63 10*3/MM3 (ref 0.1–0.9)
MONOCYTES NFR BLD AUTO: 6 % (ref 5–12)
NEUTROPHILS NFR BLD AUTO: 7.73 10*3/MM3 (ref 1.7–7)
NEUTROPHILS NFR BLD AUTO: 74.1 % (ref 42.7–76)
NRBC BLD AUTO-RTO: 0 /100 WBC (ref 0–0.2)
PAW @ PEAK INSP FLOW SETTING VENT: 17 CMH2O
PCO2 BLDA: 43 MM HG (ref 35–45)
PEEP RESPIRATORY: 5 CM[H2O]
PH BLDA: 7.43 PH UNITS (ref 7.35–7.45)
PLATELET # BLD AUTO: 303 10*3/MM3 (ref 140–450)
PMV BLD AUTO: 8.7 FL (ref 6–12)
PO2 BLDA: 85.6 MM HG (ref 83–108)
POTASSIUM SERPL-SCNC: 3.5 MMOL/L (ref 3.5–5.2)
PROT SERPL-MCNC: 7.2 G/DL (ref 6–8.5)
RBC # BLD AUTO: 3.2 10*6/MM3 (ref 3.77–5.28)
SAO2 % BLDCOA: 97.6 % (ref 94–99)
SET MECH RESP RATE: 12
SODIUM SERPL-SCNC: 136 MMOL/L (ref 136–145)
VANCOMYCIN SERPL-MCNC: 18.1 MCG/ML (ref 5–40)
VENTILATOR MODE: AC
VT ON VENT VENT: 0.4 ML
WBC NRBC COR # BLD: 10.44 10*3/MM3 (ref 3.4–10.8)

## 2022-06-29 PROCEDURE — 87070 CULTURE OTHR SPECIMN AEROBIC: CPT | Performed by: STUDENT IN AN ORGANIZED HEALTH CARE EDUCATION/TRAINING PROGRAM

## 2022-06-29 PROCEDURE — 80202 ASSAY OF VANCOMYCIN: CPT | Performed by: STUDENT IN AN ORGANIZED HEALTH CARE EDUCATION/TRAINING PROGRAM

## 2022-06-29 PROCEDURE — 74018 RADEX ABDOMEN 1 VIEW: CPT

## 2022-06-29 PROCEDURE — 93010 ELECTROCARDIOGRAM REPORT: CPT | Performed by: INTERNAL MEDICINE

## 2022-06-29 PROCEDURE — 25010000002 CEFEPIME PER 500 MG: Performed by: STUDENT IN AN ORGANIZED HEALTH CARE EDUCATION/TRAINING PROGRAM

## 2022-06-29 PROCEDURE — 63710000001 INSULIN ASPART PER 5 UNITS: Performed by: STUDENT IN AN ORGANIZED HEALTH CARE EDUCATION/TRAINING PROGRAM

## 2022-06-29 PROCEDURE — 25010000002 HYDRALAZINE PER 20 MG: Performed by: STUDENT IN AN ORGANIZED HEALTH CARE EDUCATION/TRAINING PROGRAM

## 2022-06-29 PROCEDURE — 25010000002 MORPHINE PER 10 MG: Performed by: STUDENT IN AN ORGANIZED HEALTH CARE EDUCATION/TRAINING PROGRAM

## 2022-06-29 PROCEDURE — 94760 N-INVAS EAR/PLS OXIMETRY 1: CPT

## 2022-06-29 PROCEDURE — 99233 SBSQ HOSP IP/OBS HIGH 50: CPT | Performed by: STUDENT IN AN ORGANIZED HEALTH CARE EDUCATION/TRAINING PROGRAM

## 2022-06-29 PROCEDURE — 25010000002 HEPARIN (PORCINE) PER 1000 UNITS: Performed by: INTERNAL MEDICINE

## 2022-06-29 PROCEDURE — 85025 COMPLETE CBC W/AUTO DIFF WBC: CPT | Performed by: STUDENT IN AN ORGANIZED HEALTH CARE EDUCATION/TRAINING PROGRAM

## 2022-06-29 PROCEDURE — 94799 UNLISTED PULMONARY SVC/PX: CPT

## 2022-06-29 PROCEDURE — 80053 COMPREHEN METABOLIC PANEL: CPT | Performed by: STUDENT IN AN ORGANIZED HEALTH CARE EDUCATION/TRAINING PROGRAM

## 2022-06-29 PROCEDURE — 36600 WITHDRAWAL OF ARTERIAL BLOOD: CPT

## 2022-06-29 PROCEDURE — 82962 GLUCOSE BLOOD TEST: CPT

## 2022-06-29 PROCEDURE — 25010000002 HEPARIN (PORCINE) PER 1000 UNITS: Performed by: STUDENT IN AN ORGANIZED HEALTH CARE EDUCATION/TRAINING PROGRAM

## 2022-06-29 PROCEDURE — 63710000001 INSULIN DETEMIR PER 5 UNITS: Performed by: STUDENT IN AN ORGANIZED HEALTH CARE EDUCATION/TRAINING PROGRAM

## 2022-06-29 PROCEDURE — 25010000002 EPOETIN ALFA PER 1000 UNITS: Performed by: INTERNAL MEDICINE

## 2022-06-29 PROCEDURE — 87205 SMEAR GRAM STAIN: CPT | Performed by: STUDENT IN AN ORGANIZED HEALTH CARE EDUCATION/TRAINING PROGRAM

## 2022-06-29 PROCEDURE — 93005 ELECTROCARDIOGRAM TRACING: CPT | Performed by: INTERNAL MEDICINE

## 2022-06-29 PROCEDURE — 73590 X-RAY EXAM OF LOWER LEG: CPT

## 2022-06-29 PROCEDURE — 82803 BLOOD GASES ANY COMBINATION: CPT

## 2022-06-29 PROCEDURE — 94003 VENT MGMT INPAT SUBQ DAY: CPT

## 2022-06-29 RX ORDER — HEPARIN SODIUM 1000 [USP'U]/ML
4000 INJECTION, SOLUTION INTRAVENOUS; SUBCUTANEOUS AS NEEDED
Status: DISCONTINUED | OUTPATIENT
Start: 2022-06-29 | End: 2022-07-03

## 2022-06-29 RX ADMIN — BUMETANIDE 2 MG: 0.25 INJECTION, SOLUTION INTRAMUSCULAR; INTRAVENOUS at 13:17

## 2022-06-29 RX ADMIN — HEPARIN SODIUM 5000 UNITS: 5000 INJECTION, SOLUTION INTRAVENOUS; SUBCUTANEOUS at 20:39

## 2022-06-29 RX ADMIN — DEXMEDETOMIDINE HYDROCHLORIDE 0.4 MCG/KG/HR: 100 INJECTION, SOLUTION INTRAVENOUS at 08:21

## 2022-06-29 RX ADMIN — INSULIN DETEMIR 10 UNITS: 100 INJECTION, SOLUTION SUBCUTANEOUS at 08:29

## 2022-06-29 RX ADMIN — HEPARIN SODIUM 4000 UNITS: 1000 INJECTION INTRAVENOUS; SUBCUTANEOUS at 07:53

## 2022-06-29 RX ADMIN — Medication 10 ML: at 13:19

## 2022-06-29 RX ADMIN — GABAPENTIN 300 MG: 300 CAPSULE ORAL at 11:54

## 2022-06-29 RX ADMIN — INSULIN ASPART 3 UNITS: 100 INJECTION, SOLUTION INTRAVENOUS; SUBCUTANEOUS at 06:45

## 2022-06-29 RX ADMIN — DEXMEDETOMIDINE HYDROCHLORIDE 0.6 MCG/KG/HR: 100 INJECTION, SOLUTION INTRAVENOUS at 14:58

## 2022-06-29 RX ADMIN — CHLORHEXIDINE GLUCONATE 15 ML: 1.2 SOLUTION ORAL at 08:28

## 2022-06-29 RX ADMIN — HYDRALAZINE HYDROCHLORIDE 10 MG: 20 INJECTION INTRAMUSCULAR; INTRAVENOUS at 05:41

## 2022-06-29 RX ADMIN — HEPARIN SODIUM 5000 UNITS: 5000 INJECTION, SOLUTION INTRAVENOUS; SUBCUTANEOUS at 11:56

## 2022-06-29 RX ADMIN — CEFEPIME HYDROCHLORIDE 2 G: 2 INJECTION, POWDER, FOR SOLUTION INTRAVENOUS at 00:32

## 2022-06-29 RX ADMIN — PANTOPRAZOLE SODIUM 40 MG: 40 INJECTION, POWDER, FOR SOLUTION INTRAVENOUS at 08:28

## 2022-06-29 RX ADMIN — HEPARIN SODIUM 4000 UNITS: 1000 INJECTION INTRAVENOUS; SUBCUTANEOUS at 11:14

## 2022-06-29 RX ADMIN — INSULIN DETEMIR 10 UNITS: 100 INJECTION, SOLUTION SUBCUTANEOUS at 20:40

## 2022-06-29 RX ADMIN — DOCUSATE SODIUM 50 MG AND SENNOSIDES 8.6 MG 2 TABLET: 8.6; 5 TABLET, FILM COATED ORAL at 20:39

## 2022-06-29 RX ADMIN — POLYETHYLENE GLYCOL 3350 17 G: 17 POWDER, FOR SOLUTION ORAL at 20:39

## 2022-06-29 RX ADMIN — DOCUSATE SODIUM 50 MG AND SENNOSIDES 8.6 MG 2 TABLET: 8.6; 5 TABLET, FILM COATED ORAL at 11:55

## 2022-06-29 RX ADMIN — EPOETIN ALFA 10000 UNITS: 10000 SOLUTION INTRAVENOUS; SUBCUTANEOUS at 09:24

## 2022-06-29 RX ADMIN — METOPROLOL TARTRATE 25 MG: 25 TABLET, FILM COATED ORAL at 20:39

## 2022-06-29 RX ADMIN — DEXMEDETOMIDINE HYDROCHLORIDE 0.6 MCG/KG/HR: 100 INJECTION, SOLUTION INTRAVENOUS at 20:45

## 2022-06-29 RX ADMIN — INSULIN ASPART 3 UNITS: 100 INJECTION, SOLUTION INTRAVENOUS; SUBCUTANEOUS at 17:16

## 2022-06-29 RX ADMIN — MORPHINE SULFATE 1 MG: 2 INJECTION, SOLUTION INTRAMUSCULAR; INTRAVENOUS at 03:37

## 2022-06-29 RX ADMIN — BUMETANIDE 2 MG: 0.25 INJECTION, SOLUTION INTRAMUSCULAR; INTRAVENOUS at 00:52

## 2022-06-29 RX ADMIN — BISACODYL 5 MG: 5 TABLET, COATED ORAL at 17:15

## 2022-06-29 RX ADMIN — CHLORHEXIDINE GLUCONATE 15 ML: 1.2 SOLUTION ORAL at 20:39

## 2022-06-30 ENCOUNTER — APPOINTMENT (OUTPATIENT)
Dept: GENERAL RADIOLOGY | Facility: HOSPITAL | Age: 62
End: 2022-06-30

## 2022-06-30 ENCOUNTER — APPOINTMENT (OUTPATIENT)
Dept: CT IMAGING | Facility: HOSPITAL | Age: 62
End: 2022-06-30

## 2022-06-30 PROBLEM — E87.6 HYPOKALEMIA: Status: RESOLVED | Noted: 2022-05-27 | Resolved: 2022-06-30

## 2022-06-30 PROBLEM — J95.851 VENTILATOR ASSOCIATED PNEUMONIA: Status: ACTIVE | Noted: 2022-06-30

## 2022-06-30 LAB
ALBUMIN SERPL-MCNC: 2.6 G/DL (ref 3.5–5.2)
ALBUMIN/GLOB SERPL: 0.5 G/DL
ALP SERPL-CCNC: 148 U/L (ref 39–117)
ALT SERPL W P-5'-P-CCNC: <5 U/L (ref 1–33)
ANION GAP SERPL CALCULATED.3IONS-SCNC: 20 MMOL/L (ref 5–15)
ARTERIAL PATENCY WRIST A: ABNORMAL
AST SERPL-CCNC: 14 U/L (ref 1–32)
ATMOSPHERIC PRESS: 751 MMHG
BASE EXCESS BLDA CALC-SCNC: 1 MMOL/L (ref 0–2)
BASOPHILS # BLD AUTO: 0.07 10*3/MM3 (ref 0–0.2)
BASOPHILS NFR BLD AUTO: 0.6 % (ref 0–1.5)
BDY SITE: ABNORMAL
BILIRUB SERPL-MCNC: 0.4 MG/DL (ref 0–1.2)
BUN SERPL-MCNC: 24 MG/DL (ref 8–23)
BUN/CREAT SERPL: 8.2 (ref 7–25)
CALCIUM SPEC-SCNC: 9.2 MG/DL (ref 8.6–10.5)
CHLORIDE SERPL-SCNC: 93 MMOL/L (ref 98–107)
CO2 SERPL-SCNC: 19 MMOL/L (ref 22–29)
CREAT SERPL-MCNC: 2.92 MG/DL (ref 0.57–1)
CRP SERPL-MCNC: 28.11 MG/DL (ref 0–0.5)
D-LACTATE SERPL-SCNC: 1.1 MMOL/L (ref 0.5–2)
DEPRECATED RDW RBC AUTO: 51.8 FL (ref 37–54)
EGFRCR SERPLBLD CKD-EPI 2021: 17.5 ML/MIN/1.73
EOSINOPHIL # BLD AUTO: 0.27 10*3/MM3 (ref 0–0.4)
EOSINOPHIL NFR BLD AUTO: 2.3 % (ref 0.3–6.2)
ERYTHROCYTE [DISTWIDTH] IN BLOOD BY AUTOMATED COUNT: 16.8 % (ref 12.3–15.4)
GLOBULIN UR ELPH-MCNC: 4.9 GM/DL
GLUCOSE BLDC GLUCOMTR-MCNC: 202 MG/DL (ref 70–130)
GLUCOSE BLDC GLUCOMTR-MCNC: 231 MG/DL (ref 70–130)
GLUCOSE BLDC GLUCOMTR-MCNC: 260 MG/DL (ref 70–130)
GLUCOSE SERPL-MCNC: 260 MG/DL (ref 65–99)
HCO3 BLDA-SCNC: 25.5 MMOL/L (ref 20–26)
HCT VFR BLD AUTO: 31.8 % (ref 34–46.6)
HGB BLD-MCNC: 10 G/DL (ref 12–15.9)
IMM GRANULOCYTES # BLD AUTO: 0.12 10*3/MM3 (ref 0–0.05)
IMM GRANULOCYTES NFR BLD AUTO: 1 % (ref 0–0.5)
INHALED O2 CONCENTRATION: 30 %
LYMPHOCYTES # BLD AUTO: 1.43 10*3/MM3 (ref 0.7–3.1)
LYMPHOCYTES NFR BLD AUTO: 12.1 % (ref 19.6–45.3)
Lab: ABNORMAL
MCH RBC QN AUTO: 26.9 PG (ref 26.6–33)
MCHC RBC AUTO-ENTMCNC: 31.4 G/DL (ref 31.5–35.7)
MCV RBC AUTO: 85.5 FL (ref 79–97)
MODALITY: ABNORMAL
MONOCYTES # BLD AUTO: 0.85 10*3/MM3 (ref 0.1–0.9)
MONOCYTES NFR BLD AUTO: 7.2 % (ref 5–12)
NEUTROPHILS NFR BLD AUTO: 76.8 % (ref 42.7–76)
NEUTROPHILS NFR BLD AUTO: 9.04 10*3/MM3 (ref 1.7–7)
NRBC BLD AUTO-RTO: 0 /100 WBC (ref 0–0.2)
PAW @ PEAK INSP FLOW SETTING VENT: 19 CMH2O
PCO2 BLDA: 39.2 MM HG (ref 35–45)
PEEP RESPIRATORY: 5 CM[H2O]
PH BLDA: 7.42 PH UNITS (ref 7.35–7.45)
PLATELET # BLD AUTO: 265 10*3/MM3 (ref 140–450)
PMV BLD AUTO: 8.5 FL (ref 6–12)
PO2 BLDA: 80 MM HG (ref 83–108)
POTASSIUM SERPL-SCNC: 4.3 MMOL/L (ref 3.5–5.2)
PROCALCITONIN SERPL-MCNC: 2.64 NG/ML (ref 0–0.25)
PROT SERPL-MCNC: 7.5 G/DL (ref 6–8.5)
PSV: 14 CMH2O
RBC # BLD AUTO: 3.72 10*6/MM3 (ref 3.77–5.28)
SAO2 % BLDCOA: 97.2 % (ref 94–99)
SET MECH RESP RATE: 12
SODIUM SERPL-SCNC: 132 MMOL/L (ref 136–145)
VENTILATOR MODE: ABNORMAL
VT ON VENT VENT: 400 ML
WBC NRBC COR # BLD: 11.78 10*3/MM3 (ref 3.4–10.8)

## 2022-06-30 PROCEDURE — 82962 GLUCOSE BLOOD TEST: CPT

## 2022-06-30 PROCEDURE — 80053 COMPREHEN METABOLIC PANEL: CPT | Performed by: STUDENT IN AN ORGANIZED HEALTH CARE EDUCATION/TRAINING PROGRAM

## 2022-06-30 PROCEDURE — 25010000002 HYDRALAZINE PER 20 MG: Performed by: STUDENT IN AN ORGANIZED HEALTH CARE EDUCATION/TRAINING PROGRAM

## 2022-06-30 PROCEDURE — 87040 BLOOD CULTURE FOR BACTERIA: CPT | Performed by: STUDENT IN AN ORGANIZED HEALTH CARE EDUCATION/TRAINING PROGRAM

## 2022-06-30 PROCEDURE — 87186 SC STD MICRODIL/AGAR DIL: CPT | Performed by: STUDENT IN AN ORGANIZED HEALTH CARE EDUCATION/TRAINING PROGRAM

## 2022-06-30 PROCEDURE — 25010000002 HEPARIN (PORCINE) PER 1000 UNITS: Performed by: STUDENT IN AN ORGANIZED HEALTH CARE EDUCATION/TRAINING PROGRAM

## 2022-06-30 PROCEDURE — 74018 RADEX ABDOMEN 1 VIEW: CPT

## 2022-06-30 PROCEDURE — 63710000001 INSULIN ASPART PER 5 UNITS: Performed by: STUDENT IN AN ORGANIZED HEALTH CARE EDUCATION/TRAINING PROGRAM

## 2022-06-30 PROCEDURE — 63710000001 INSULIN DETEMIR PER 5 UNITS: Performed by: STUDENT IN AN ORGANIZED HEALTH CARE EDUCATION/TRAINING PROGRAM

## 2022-06-30 PROCEDURE — 74176 CT ABD & PELVIS W/O CONTRAST: CPT

## 2022-06-30 PROCEDURE — 94799 UNLISTED PULMONARY SVC/PX: CPT

## 2022-06-30 PROCEDURE — 36600 WITHDRAWAL OF ARTERIAL BLOOD: CPT

## 2022-06-30 PROCEDURE — 71045 X-RAY EXAM CHEST 1 VIEW: CPT

## 2022-06-30 PROCEDURE — 87086 URINE CULTURE/COLONY COUNT: CPT | Performed by: STUDENT IN AN ORGANIZED HEALTH CARE EDUCATION/TRAINING PROGRAM

## 2022-06-30 PROCEDURE — 25010000002 LINEZOLID 600 MG/300ML SOLUTION: Performed by: STUDENT IN AN ORGANIZED HEALTH CARE EDUCATION/TRAINING PROGRAM

## 2022-06-30 PROCEDURE — 85025 COMPLETE CBC W/AUTO DIFF WBC: CPT | Performed by: STUDENT IN AN ORGANIZED HEALTH CARE EDUCATION/TRAINING PROGRAM

## 2022-06-30 PROCEDURE — 83605 ASSAY OF LACTIC ACID: CPT | Performed by: STUDENT IN AN ORGANIZED HEALTH CARE EDUCATION/TRAINING PROGRAM

## 2022-06-30 PROCEDURE — 87205 SMEAR GRAM STAIN: CPT | Performed by: STUDENT IN AN ORGANIZED HEALTH CARE EDUCATION/TRAINING PROGRAM

## 2022-06-30 PROCEDURE — 82803 BLOOD GASES ANY COMBINATION: CPT

## 2022-06-30 PROCEDURE — 25010000002 MORPHINE PER 10 MG: Performed by: STUDENT IN AN ORGANIZED HEALTH CARE EDUCATION/TRAINING PROGRAM

## 2022-06-30 PROCEDURE — 87070 CULTURE OTHR SPECIMN AEROBIC: CPT | Performed by: STUDENT IN AN ORGANIZED HEALTH CARE EDUCATION/TRAINING PROGRAM

## 2022-06-30 PROCEDURE — 94003 VENT MGMT INPAT SUBQ DAY: CPT

## 2022-06-30 PROCEDURE — 25010000002 CEFEPIME PER 500 MG: Performed by: STUDENT IN AN ORGANIZED HEALTH CARE EDUCATION/TRAINING PROGRAM

## 2022-06-30 PROCEDURE — 81001 URINALYSIS AUTO W/SCOPE: CPT | Performed by: STUDENT IN AN ORGANIZED HEALTH CARE EDUCATION/TRAINING PROGRAM

## 2022-06-30 PROCEDURE — 99291 CRITICAL CARE FIRST HOUR: CPT | Performed by: STUDENT IN AN ORGANIZED HEALTH CARE EDUCATION/TRAINING PROGRAM

## 2022-06-30 PROCEDURE — 86140 C-REACTIVE PROTEIN: CPT | Performed by: STUDENT IN AN ORGANIZED HEALTH CARE EDUCATION/TRAINING PROGRAM

## 2022-06-30 PROCEDURE — 87147 CULTURE TYPE IMMUNOLOGIC: CPT | Performed by: STUDENT IN AN ORGANIZED HEALTH CARE EDUCATION/TRAINING PROGRAM

## 2022-06-30 PROCEDURE — 84145 PROCALCITONIN (PCT): CPT | Performed by: STUDENT IN AN ORGANIZED HEALTH CARE EDUCATION/TRAINING PROGRAM

## 2022-06-30 PROCEDURE — 25010000002 MEROPENEM PER 100 MG: Performed by: STUDENT IN AN ORGANIZED HEALTH CARE EDUCATION/TRAINING PROGRAM

## 2022-06-30 RX ORDER — LISINOPRIL 20 MG/1
20 TABLET ORAL
Status: DISCONTINUED | OUTPATIENT
Start: 2022-07-02 | End: 2022-06-30

## 2022-06-30 RX ORDER — LISINOPRIL 20 MG/1
20 TABLET ORAL
Status: DISCONTINUED | OUTPATIENT
Start: 2022-07-02 | End: 2022-07-07 | Stop reason: HOSPADM

## 2022-06-30 RX ORDER — LINEZOLID 2 MG/ML
600 INJECTION, SOLUTION INTRAVENOUS EVERY 12 HOURS
Status: DISCONTINUED | OUTPATIENT
Start: 2022-06-30 | End: 2022-07-05

## 2022-06-30 RX ORDER — BUMETANIDE 0.25 MG/ML
2 INJECTION INTRAMUSCULAR; INTRAVENOUS DAILY
Status: DISCONTINUED | OUTPATIENT
Start: 2022-07-01 | End: 2022-07-02

## 2022-06-30 RX ADMIN — Medication 10 ML: at 09:02

## 2022-06-30 RX ADMIN — DEXMEDETOMIDINE HYDROCHLORIDE 0.7 MCG/KG/HR: 100 INJECTION, SOLUTION INTRAVENOUS at 15:59

## 2022-06-30 RX ADMIN — INSULIN DETEMIR 10 UNITS: 100 INJECTION, SOLUTION SUBCUTANEOUS at 09:01

## 2022-06-30 RX ADMIN — CEFEPIME HYDROCHLORIDE 2 G: 2 INJECTION, POWDER, FOR SOLUTION INTRAVENOUS at 00:06

## 2022-06-30 RX ADMIN — LINEZOLID 600 MG: 600 INJECTION, SOLUTION INTRAVENOUS at 22:15

## 2022-06-30 RX ADMIN — GABAPENTIN 300 MG: 300 CAPSULE ORAL at 09:00

## 2022-06-30 RX ADMIN — DOCUSATE SODIUM 50 MG AND SENNOSIDES 8.6 MG 2 TABLET: 8.6; 5 TABLET, FILM COATED ORAL at 20:52

## 2022-06-30 RX ADMIN — INSULIN ASPART 3 UNITS: 100 INJECTION, SOLUTION INTRAVENOUS; SUBCUTANEOUS at 13:57

## 2022-06-30 RX ADMIN — HEPARIN SODIUM 5000 UNITS: 5000 INJECTION, SOLUTION INTRAVENOUS; SUBCUTANEOUS at 09:00

## 2022-06-30 RX ADMIN — DEXMEDETOMIDINE HYDROCHLORIDE 0.7 MCG/KG/HR: 100 INJECTION, SOLUTION INTRAVENOUS at 11:50

## 2022-06-30 RX ADMIN — Medication 10 ML: at 20:52

## 2022-06-30 RX ADMIN — METOPROLOL TARTRATE 25 MG: 25 TABLET, FILM COATED ORAL at 20:08

## 2022-06-30 RX ADMIN — MORPHINE SULFATE 1 MG: 2 INJECTION, SOLUTION INTRAMUSCULAR; INTRAVENOUS at 20:06

## 2022-06-30 RX ADMIN — BUMETANIDE 2 MG: 0.25 INJECTION, SOLUTION INTRAMUSCULAR; INTRAVENOUS at 00:06

## 2022-06-30 RX ADMIN — HEPARIN SODIUM 5000 UNITS: 5000 INJECTION, SOLUTION INTRAVENOUS; SUBCUTANEOUS at 20:06

## 2022-06-30 RX ADMIN — INSULIN DETEMIR 15 UNITS: 100 INJECTION, SOLUTION SUBCUTANEOUS at 20:06

## 2022-06-30 RX ADMIN — DEXMEDETOMIDINE HYDROCHLORIDE 0.7 MCG/KG/HR: 100 INJECTION, SOLUTION INTRAVENOUS at 07:36

## 2022-06-30 RX ADMIN — CHLORHEXIDINE GLUCONATE 15 ML: 1.2 SOLUTION ORAL at 20:06

## 2022-06-30 RX ADMIN — DOCUSATE SODIUM 50 MG AND SENNOSIDES 8.6 MG 2 TABLET: 8.6; 5 TABLET, FILM COATED ORAL at 09:01

## 2022-06-30 RX ADMIN — CHLORHEXIDINE GLUCONATE 15 ML: 1.2 SOLUTION ORAL at 09:00

## 2022-06-30 RX ADMIN — LINEZOLID 600 MG: 600 INJECTION, SOLUTION INTRAVENOUS at 13:32

## 2022-06-30 RX ADMIN — Medication 10 ML: at 09:08

## 2022-06-30 RX ADMIN — INSULIN ASPART 3 UNITS: 100 INJECTION, SOLUTION INTRAVENOUS; SUBCUTANEOUS at 17:36

## 2022-06-30 RX ADMIN — DEXMEDETOMIDINE HYDROCHLORIDE 0.7 MCG/KG/HR: 100 INJECTION, SOLUTION INTRAVENOUS at 21:37

## 2022-06-30 RX ADMIN — LISINOPRIL 20 MG: 20 TABLET ORAL at 09:01

## 2022-06-30 RX ADMIN — INSULIN ASPART 4 UNITS: 100 INJECTION, SOLUTION INTRAVENOUS; SUBCUTANEOUS at 09:08

## 2022-06-30 RX ADMIN — HYDRALAZINE HYDROCHLORIDE 10 MG: 20 INJECTION INTRAMUSCULAR; INTRAVENOUS at 04:26

## 2022-06-30 RX ADMIN — PANTOPRAZOLE SODIUM 40 MG: 40 INJECTION, POWDER, FOR SOLUTION INTRAVENOUS at 09:01

## 2022-06-30 RX ADMIN — MEROPENEM 1 G: 1 INJECTION, POWDER, FOR SOLUTION INTRAVENOUS at 15:18

## 2022-06-30 RX ADMIN — Medication 1 G: at 21:00

## 2022-06-30 RX ADMIN — METOPROLOL TARTRATE 25 MG: 25 TABLET, FILM COATED ORAL at 09:01

## 2022-06-30 RX ADMIN — DEXMEDETOMIDINE HYDROCHLORIDE 0.7 MCG/KG/HR: 100 INJECTION, SOLUTION INTRAVENOUS at 02:46

## 2022-07-01 ENCOUNTER — APPOINTMENT (OUTPATIENT)
Dept: GENERAL RADIOLOGY | Facility: HOSPITAL | Age: 62
End: 2022-07-01

## 2022-07-01 PROBLEM — R10.84 GENERALIZED ABDOMINAL PAIN: Status: ACTIVE | Noted: 2022-07-01

## 2022-07-01 PROBLEM — E83.42 HYPOMAGNESEMIA: Status: RESOLVED | Noted: 2022-06-28 | Resolved: 2022-07-01

## 2022-07-01 LAB
ALBUMIN SERPL-MCNC: 3 G/DL (ref 3.5–5.2)
ALBUMIN/GLOB SERPL: 0.7 G/DL
ALP SERPL-CCNC: 144 U/L (ref 39–117)
ALT SERPL W P-5'-P-CCNC: <5 U/L (ref 1–33)
ANION GAP SERPL CALCULATED.3IONS-SCNC: 16 MMOL/L (ref 5–15)
ARTERIAL PATENCY WRIST A: ABNORMAL
AST SERPL-CCNC: 10 U/L (ref 1–32)
ATMOSPHERIC PRESS: 749 MMHG
BACTERIA SPEC AEROBE CULT: NORMAL
BASE EXCESS BLDA CALC-SCNC: 3.4 MMOL/L (ref 0–2)
BASOPHILS # BLD AUTO: 0.1 10*3/MM3 (ref 0–0.2)
BASOPHILS NFR BLD AUTO: 0.8 % (ref 0–1.5)
BDY SITE: ABNORMAL
BILIRUB SERPL-MCNC: 0.3 MG/DL (ref 0–1.2)
BUN SERPL-MCNC: 37 MG/DL (ref 8–23)
BUN/CREAT SERPL: 10.1 (ref 7–25)
CALCIUM SPEC-SCNC: 9.3 MG/DL (ref 8.6–10.5)
CHLORIDE SERPL-SCNC: 94 MMOL/L (ref 98–107)
CO2 SERPL-SCNC: 24 MMOL/L (ref 22–29)
CREAT SERPL-MCNC: 3.67 MG/DL (ref 0.57–1)
DEPRECATED RDW RBC AUTO: 52.3 FL (ref 37–54)
EGFRCR SERPLBLD CKD-EPI 2021: 13.3 ML/MIN/1.73
EOSINOPHIL # BLD AUTO: 0.41 10*3/MM3 (ref 0–0.4)
EOSINOPHIL NFR BLD AUTO: 3.4 % (ref 0.3–6.2)
ERYTHROCYTE [DISTWIDTH] IN BLOOD BY AUTOMATED COUNT: 16.7 % (ref 12.3–15.4)
GLOBULIN UR ELPH-MCNC: 4.3 GM/DL
GLUCOSE BLDC GLUCOMTR-MCNC: 142 MG/DL (ref 70–130)
GLUCOSE BLDC GLUCOMTR-MCNC: 238 MG/DL (ref 70–130)
GLUCOSE BLDC GLUCOMTR-MCNC: 243 MG/DL (ref 70–130)
GLUCOSE BLDC GLUCOMTR-MCNC: 246 MG/DL (ref 70–130)
GLUCOSE BLDC GLUCOMTR-MCNC: 347 MG/DL (ref 70–130)
GLUCOSE SERPL-MCNC: 289 MG/DL (ref 65–99)
HCO3 BLDA-SCNC: 28.2 MMOL/L (ref 20–26)
HCT VFR BLD AUTO: 30.3 % (ref 34–46.6)
HEMOCCULT STL QL: POSITIVE
HGB BLD-MCNC: 9.5 G/DL (ref 12–15.9)
IMM GRANULOCYTES # BLD AUTO: 0.15 10*3/MM3 (ref 0–0.05)
IMM GRANULOCYTES NFR BLD AUTO: 1.2 % (ref 0–0.5)
INHALED O2 CONCENTRATION: 30 %
LYMPHOCYTES # BLD AUTO: 1.5 10*3/MM3 (ref 0.7–3.1)
LYMPHOCYTES NFR BLD AUTO: 12.4 % (ref 19.6–45.3)
Lab: ABNORMAL
MAGNESIUM SERPL-MCNC: 1.9 MG/DL (ref 1.6–2.4)
MCH RBC QN AUTO: 26.8 PG (ref 26.6–33)
MCHC RBC AUTO-ENTMCNC: 31.4 G/DL (ref 31.5–35.7)
MCV RBC AUTO: 85.6 FL (ref 79–97)
MODALITY: ABNORMAL
MONOCYTES # BLD AUTO: 1.08 10*3/MM3 (ref 0.1–0.9)
MONOCYTES NFR BLD AUTO: 8.9 % (ref 5–12)
NEUTROPHILS NFR BLD AUTO: 73.3 % (ref 42.7–76)
NEUTROPHILS NFR BLD AUTO: 8.88 10*3/MM3 (ref 1.7–7)
NRBC BLD AUTO-RTO: 0 /100 WBC (ref 0–0.2)
PAW @ PEAK INSP FLOW SETTING VENT: 18 CMH2O
PCO2 BLDA: 43.2 MM HG (ref 35–45)
PEEP RESPIRATORY: 5 CM[H2O]
PH BLDA: 7.42 PH UNITS (ref 7.35–7.45)
PLATELET # BLD AUTO: 347 10*3/MM3 (ref 140–450)
PMV BLD AUTO: 8.4 FL (ref 6–12)
PO2 BLDA: 76.7 MM HG (ref 83–108)
POTASSIUM SERPL-SCNC: 4.1 MMOL/L (ref 3.5–5.2)
PROT SERPL-MCNC: 7.3 G/DL (ref 6–8.5)
PSV: 12 CMH2O
RBC # BLD AUTO: 3.54 10*6/MM3 (ref 3.77–5.28)
SAO2 % BLDCOA: 96.4 % (ref 94–99)
SET MECH RESP RATE: 10
SODIUM SERPL-SCNC: 134 MMOL/L (ref 136–145)
VENTILATOR MODE: ABNORMAL
VT ON VENT VENT: 400 ML
WBC NRBC COR # BLD: 12.12 10*3/MM3 (ref 3.4–10.8)

## 2022-07-01 PROCEDURE — 93005 ELECTROCARDIOGRAM TRACING: CPT | Performed by: STUDENT IN AN ORGANIZED HEALTH CARE EDUCATION/TRAINING PROGRAM

## 2022-07-01 PROCEDURE — 63710000001 INSULIN ASPART PER 5 UNITS: Performed by: STUDENT IN AN ORGANIZED HEALTH CARE EDUCATION/TRAINING PROGRAM

## 2022-07-01 PROCEDURE — 25010000002 MAGNESIUM SULFATE 2 GM/50ML SOLUTION: Performed by: STUDENT IN AN ORGANIZED HEALTH CARE EDUCATION/TRAINING PROGRAM

## 2022-07-01 PROCEDURE — 25010000002 HEPARIN (PORCINE) PER 1000 UNITS: Performed by: INTERNAL MEDICINE

## 2022-07-01 PROCEDURE — 25010000002 HYDRALAZINE PER 20 MG: Performed by: STUDENT IN AN ORGANIZED HEALTH CARE EDUCATION/TRAINING PROGRAM

## 2022-07-01 PROCEDURE — 82272 OCCULT BLD FECES 1-3 TESTS: CPT | Performed by: STUDENT IN AN ORGANIZED HEALTH CARE EDUCATION/TRAINING PROGRAM

## 2022-07-01 PROCEDURE — 85025 COMPLETE CBC W/AUTO DIFF WBC: CPT | Performed by: STUDENT IN AN ORGANIZED HEALTH CARE EDUCATION/TRAINING PROGRAM

## 2022-07-01 PROCEDURE — 94799 UNLISTED PULMONARY SVC/PX: CPT

## 2022-07-01 PROCEDURE — 25010000002 MEROPENEM PER 100 MG: Performed by: STUDENT IN AN ORGANIZED HEALTH CARE EDUCATION/TRAINING PROGRAM

## 2022-07-01 PROCEDURE — 25010000002 LINEZOLID 600 MG/300ML SOLUTION: Performed by: STUDENT IN AN ORGANIZED HEALTH CARE EDUCATION/TRAINING PROGRAM

## 2022-07-01 PROCEDURE — 82962 GLUCOSE BLOOD TEST: CPT

## 2022-07-01 PROCEDURE — 63710000001 INSULIN DETEMIR PER 5 UNITS: Performed by: STUDENT IN AN ORGANIZED HEALTH CARE EDUCATION/TRAINING PROGRAM

## 2022-07-01 PROCEDURE — 99222 1ST HOSP IP/OBS MODERATE 55: CPT | Performed by: INTERNAL MEDICINE

## 2022-07-01 PROCEDURE — 25010000002 ALBUMIN HUMAN 25% PER 50 ML: Performed by: INTERNAL MEDICINE

## 2022-07-01 PROCEDURE — 36600 WITHDRAWAL OF ARTERIAL BLOOD: CPT

## 2022-07-01 PROCEDURE — 94003 VENT MGMT INPAT SUBQ DAY: CPT

## 2022-07-01 PROCEDURE — 93010 ELECTROCARDIOGRAM REPORT: CPT | Performed by: INTERNAL MEDICINE

## 2022-07-01 PROCEDURE — 99291 CRITICAL CARE FIRST HOUR: CPT | Performed by: STUDENT IN AN ORGANIZED HEALTH CARE EDUCATION/TRAINING PROGRAM

## 2022-07-01 PROCEDURE — 25010000002 MORPHINE PER 10 MG: Performed by: STUDENT IN AN ORGANIZED HEALTH CARE EDUCATION/TRAINING PROGRAM

## 2022-07-01 PROCEDURE — 25010000002 EPOETIN ALFA PER 1000 UNITS: Performed by: INTERNAL MEDICINE

## 2022-07-01 PROCEDURE — 80053 COMPREHEN METABOLIC PANEL: CPT | Performed by: STUDENT IN AN ORGANIZED HEALTH CARE EDUCATION/TRAINING PROGRAM

## 2022-07-01 PROCEDURE — 82803 BLOOD GASES ANY COMBINATION: CPT

## 2022-07-01 PROCEDURE — P9047 ALBUMIN (HUMAN), 25%, 50ML: HCPCS | Performed by: INTERNAL MEDICINE

## 2022-07-01 PROCEDURE — 83735 ASSAY OF MAGNESIUM: CPT | Performed by: INTERNAL MEDICINE

## 2022-07-01 PROCEDURE — 25010000002 PROPOFOL 10 MG/ML EMULSION: Performed by: CHIROPRACTOR

## 2022-07-01 PROCEDURE — 94760 N-INVAS EAR/PLS OXIMETRY 1: CPT

## 2022-07-01 PROCEDURE — 71045 X-RAY EXAM CHEST 1 VIEW: CPT

## 2022-07-01 RX ORDER — SODIUM CHLORIDE 0.9 % (FLUSH) 0.9 %
10 SYRINGE (ML) INJECTION EVERY 12 HOURS SCHEDULED
Status: DISCONTINUED | OUTPATIENT
Start: 2022-07-01 | End: 2022-07-07 | Stop reason: HOSPADM

## 2022-07-01 RX ORDER — HEPARIN SODIUM 1000 [USP'U]/ML
4000 INJECTION, SOLUTION INTRAVENOUS; SUBCUTANEOUS AS NEEDED
Status: DISCONTINUED | OUTPATIENT
Start: 2022-07-01 | End: 2022-07-04 | Stop reason: SDUPTHER

## 2022-07-01 RX ORDER — LEVOTHYROXINE SODIUM ANHYDROUS 100 UG/5ML
18.75 INJECTION, POWDER, LYOPHILIZED, FOR SOLUTION INTRAVENOUS
Status: DISCONTINUED | OUTPATIENT
Start: 2022-07-01 | End: 2022-07-05

## 2022-07-01 RX ORDER — GABAPENTIN 250 MG/5ML
300 SOLUTION ORAL DAILY
Status: DISCONTINUED | OUTPATIENT
Start: 2022-07-01 | End: 2022-07-04

## 2022-07-01 RX ORDER — SODIUM CHLORIDE 0.9 % (FLUSH) 0.9 %
20 SYRINGE (ML) INJECTION AS NEEDED
Status: DISCONTINUED | OUTPATIENT
Start: 2022-07-01 | End: 2022-07-07 | Stop reason: HOSPADM

## 2022-07-01 RX ORDER — SODIUM CHLORIDE 0.9 % (FLUSH) 0.9 %
10 SYRINGE (ML) INJECTION AS NEEDED
Status: DISCONTINUED | OUTPATIENT
Start: 2022-07-01 | End: 2022-07-07 | Stop reason: HOSPADM

## 2022-07-01 RX ORDER — INSULIN ASPART 100 [IU]/ML
8 INJECTION, SOLUTION INTRAVENOUS; SUBCUTANEOUS
Status: DISCONTINUED | OUTPATIENT
Start: 2022-07-01 | End: 2022-07-02

## 2022-07-01 RX ORDER — ALBUMIN (HUMAN) 12.5 G/50ML
12.5 SOLUTION INTRAVENOUS AS NEEDED
Status: DISPENSED | OUTPATIENT
Start: 2022-07-01 | End: 2022-07-01

## 2022-07-01 RX ADMIN — MORPHINE SULFATE 1 MG: 2 INJECTION, SOLUTION INTRAMUSCULAR; INTRAVENOUS at 20:26

## 2022-07-01 RX ADMIN — DEXMEDETOMIDINE HYDROCHLORIDE 0.7 MCG/KG/HR: 100 INJECTION, SOLUTION INTRAVENOUS at 02:26

## 2022-07-01 RX ADMIN — INSULIN DETEMIR 15 UNITS: 100 INJECTION, SOLUTION SUBCUTANEOUS at 20:54

## 2022-07-01 RX ADMIN — DOCUSATE SODIUM 50 MG AND SENNOSIDES 8.6 MG 2 TABLET: 8.6; 5 TABLET, FILM COATED ORAL at 12:48

## 2022-07-01 RX ADMIN — DEXMEDETOMIDINE HYDROCHLORIDE 0.7 MCG/KG/HR: 100 INJECTION, SOLUTION INTRAVENOUS at 06:35

## 2022-07-01 RX ADMIN — METOPROLOL TARTRATE 25 MG: 25 TABLET, FILM COATED ORAL at 12:49

## 2022-07-01 RX ADMIN — HEPARIN SODIUM 4000 UNITS: 1000 INJECTION INTRAVENOUS; SUBCUTANEOUS at 12:37

## 2022-07-01 RX ADMIN — Medication 10 ML: at 20:23

## 2022-07-01 RX ADMIN — PROPOFOL 5 MCG/KG/MIN: 10 INJECTION, EMULSION INTRAVENOUS at 21:45

## 2022-07-01 RX ADMIN — INSULIN ASPART 5 UNITS: 100 INJECTION, SOLUTION INTRAVENOUS; SUBCUTANEOUS at 08:28

## 2022-07-01 RX ADMIN — HYDRALAZINE HYDROCHLORIDE 10 MG: 20 INJECTION INTRAMUSCULAR; INTRAVENOUS at 00:34

## 2022-07-01 RX ADMIN — ALBUMIN HUMAN 12.5 G: 0.25 SOLUTION INTRAVENOUS at 10:35

## 2022-07-01 RX ADMIN — LINEZOLID 600 MG: 600 INJECTION, SOLUTION INTRAVENOUS at 21:23

## 2022-07-01 RX ADMIN — GABAPENTIN 300 MG: 250 SUSPENSION ORAL at 12:48

## 2022-07-01 RX ADMIN — INSULIN ASPART 8 UNITS: 100 INJECTION, SOLUTION INTRAVENOUS; SUBCUTANEOUS at 08:30

## 2022-07-01 RX ADMIN — DEXMEDETOMIDINE HYDROCHLORIDE 0.4 MCG/KG/HR: 100 INJECTION, SOLUTION INTRAVENOUS at 15:51

## 2022-07-01 RX ADMIN — BUMETANIDE 2 MG: 0.25 INJECTION, SOLUTION INTRAMUSCULAR; INTRAVENOUS at 08:07

## 2022-07-01 RX ADMIN — ISOSORBIDE MONONITRATE 30 MG: 30 TABLET, EXTENDED RELEASE ORAL at 12:48

## 2022-07-01 RX ADMIN — DEXMEDETOMIDINE HYDROCHLORIDE 1.5 MCG/KG/HR: 100 INJECTION, SOLUTION INTRAVENOUS at 20:22

## 2022-07-01 RX ADMIN — ALBUMIN HUMAN 12.5 G: 0.25 SOLUTION INTRAVENOUS at 09:20

## 2022-07-01 RX ADMIN — Medication 1 G: at 04:53

## 2022-07-01 RX ADMIN — MAGNESIUM SULFATE HEPTAHYDRATE 2 G: 40 INJECTION, SOLUTION INTRAVENOUS at 16:17

## 2022-07-01 RX ADMIN — Medication 10 ML: at 20:24

## 2022-07-01 RX ADMIN — EPOETIN ALFA 10000 UNITS: 10000 SOLUTION INTRAVENOUS; SUBCUTANEOUS at 12:27

## 2022-07-01 RX ADMIN — INSULIN DETEMIR 15 UNITS: 100 INJECTION, SOLUTION SUBCUTANEOUS at 08:29

## 2022-07-01 RX ADMIN — PANTOPRAZOLE SODIUM 40 MG: 40 INJECTION, POWDER, FOR SOLUTION INTRAVENOUS at 08:07

## 2022-07-01 RX ADMIN — LEVOTHYROXINE SODIUM ANHYDROUS 18.75 MCG: 100 INJECTION, POWDER, LYOPHILIZED, FOR SOLUTION INTRAVENOUS at 12:48

## 2022-07-01 RX ADMIN — INSULIN ASPART 8 UNITS: 100 INJECTION, SOLUTION INTRAVENOUS; SUBCUTANEOUS at 17:03

## 2022-07-01 RX ADMIN — POLYETHYLENE GLYCOL 3350 17 G: 17 POWDER, FOR SOLUTION ORAL at 12:49

## 2022-07-01 RX ADMIN — SODIUM CHLORIDE 75 ML/HR: 9 INJECTION, SOLUTION INTRAVENOUS at 08:26

## 2022-07-01 RX ADMIN — DOCUSATE SODIUM 50 MG AND SENNOSIDES 8.6 MG 2 TABLET: 8.6; 5 TABLET, FILM COATED ORAL at 20:23

## 2022-07-01 RX ADMIN — METOPROLOL TARTRATE 25 MG: 25 TABLET, FILM COATED ORAL at 20:23

## 2022-07-01 RX ADMIN — CHLORHEXIDINE GLUCONATE 15 ML: 1.2 SOLUTION ORAL at 20:23

## 2022-07-01 RX ADMIN — CHLORHEXIDINE GLUCONATE 15 ML: 1.2 SOLUTION ORAL at 08:08

## 2022-07-01 RX ADMIN — Medication 10 ML: at 08:07

## 2022-07-01 RX ADMIN — INSULIN ASPART 3 UNITS: 100 INJECTION, SOLUTION INTRAVENOUS; SUBCUTANEOUS at 17:03

## 2022-07-01 RX ADMIN — INSULIN ASPART 8 UNITS: 100 INJECTION, SOLUTION INTRAVENOUS; SUBCUTANEOUS at 11:23

## 2022-07-01 RX ADMIN — LINEZOLID 600 MG: 600 INJECTION, SOLUTION INTRAVENOUS at 12:52

## 2022-07-01 NOTE — PROGRESS NOTES
CRITICAL CARE DAILY PROGRESS NOTE  Family Medicine Residency Service  NAME: Yamileth Slater  : 1960  MRN: [id number]      LOS: 6 days     PROVIDER OF SERVICE: Perez Hooker MD    Chief Complaint: Altered mental status    Subjective:     Interval History: History provided by: chart RN  Pt having abdominal pain, reported by blinking response when asked. Tube feeds started yesterday.     Feeding: Tube feeds; 0.9NS  Analgesia: Morphine IV   Sedation: PRecedex  Thromboprophylaxis: Heparin  HOB: 30 degrees  Ulcer ppx: PPIs   Glucose control: SSI  SBT: Patient receiving HD, will attempt SBT after  Bowel Regimen:Miralax and Enema  Indwelling catheters: Central line, Day# 6 and IV, Day# 6; Forman catheter is in place.  De-escalation of antibiotics: continue Meropenum and linezolid, d/c if afebrile tomorrow    Review of Systems:   Review of Systems   Unable to perform ROS: Intubated       Objective:     Vital Signs  Temp:  [97.8 °F (36.6 °C)-99.7 °F (37.6 °C)] 97.8 °F (36.6 °C)  Heart Rate:  [63-75] 68  Resp:  [22-31] 22  BP: (121-201)/(60-86) 146/68  FiO2 (%):  [30 %] 30 %  Body mass index is 50.4 kg/m².    FiO2 (%):  [30 %] 30 %  S RR:  [10-12] 10  PEEP/CPAP (cm H2O):  [5 cm H20] 5 cm H20  CO SUP:  [12 cm H20-14 cm H20] 12 cm H20  MAP (cm H2O):  [6.8-11] 9.3    I/O last 3 completed shifts:  In: 1095 [I.V.:1095]  Out: 550 [Urine:250; Emesis/NG output:300]    Physical Exam  Physical Exam  Constitutional:       Interventions: She is intubated.   HENT:      Head: Normocephalic and atraumatic.   Cardiovascular:      Rate and Rhythm: Normal rate and regular rhythm.      Pulses: Normal pulses.   Pulmonary:      Effort: Pulmonary effort is normal. She is intubated.      Breath sounds: No rhonchi or rales.   Abdominal:      General: Bowel sounds are decreased.      Palpations: Abdomen is soft.   Musculoskeletal:      Right lower leg: No edema.      Left lower leg: No edema.   Skin:     General: Skin is warm.       Capillary Refill: Capillary refill takes less than 2 seconds.         Vent Settings          Resp Rate (Set): 10  Pressure Support (cm H2O): 12 cm H20  FiO2 (%): 30 %  PEEP/CPAP (cm H2O): 5 cm H20    Minute Ventilation (L/min) (Obs): 9.62 L/min  Resp Rate (Observed) Vent: 24     I:E Ratio (Obs): 1:1.90    PIP Observed (cm H2O): 19 cm H2O  Plateau Pressure (cm H2O): 19 cm H2O  Driving Pressure (cm H2O): 14.3 cm H2O    Medication Review    Current Facility-Administered Medications:   •  albumin human 25 % IV SOLN 12.5 g, 12.5 g, Intravenous, PRN, Ace Lauren MD  •  sennosides-docusate (PERICOLACE) 8.6-50 MG per tablet 2 tablet, 2 tablet, Oral, BID, 2 tablet at 06/30/22 2052 **AND** polyethylene glycol (MIRALAX) packet 17 g, 17 g, Oral, Daily PRN, 17 g at 06/29/22 2039 **AND** bisacodyl (DULCOLAX) EC tablet 5 mg, 5 mg, Oral, Daily PRN, 5 mg at 06/29/22 1715 **AND** bisacodyl (DULCOLAX) suppository 10 mg, 10 mg, Rectal, Daily PRN, Charlene Castro MD  •  bumetanide (BUMEX) injection 2 mg, 2 mg, Intravenous, Daily, Ace Lauren MD, 2 mg at 07/01/22 0807  •  chlorhexidine (PERIDEX) 0.12 % solution 15 mL, 15 mL, Mouth/Throat, Q12H, Charlene Castro MD, 15 mL at 07/01/22 0808  •  dexmedetomidine (PRECEDEX) 400 mcg in 100 mL NS infusion, 0.2-1.5 mcg/kg/hr, Intravenous, Titrated, Cintia oMntez MD, Last Rate: 21.9 mL/hr at 07/01/22 0635, 0.7 mcg/kg/hr at 07/01/22 0635  •  dextrose (D50W) (25 g/50 mL) IV injection 10-50 mL, 10-50 mL, Intravenous, Q15 Min PRN, Charlene Castro MD  •  dextrose (D50W) (25 g/50 mL) IV injection 25 g, 25 g, Intravenous, Q15 Min PRN, Jung Leonard MD  •  dextrose (GLUTOSE) oral gel 15 g, 15 g, Oral, Q15 Min PRN, Jung Leonard MD  •  epoetin hubert (EPOGEN,PROCRIT) injection 10,000 Units, 10,000 Units, Intravenous, Once per day on Mon Wed Fri, Ace Lauren MD, 10,000 Units at 06/29/22 0924  •  gabapentin (NEURONTIN) 50 mg/mL solution 300 mg, 300 mg, Oral, Daily, Coffman,  MD Jerman  •  glucagon (human recombinant) (GLUCAGEN DIAGNOSTIC) injection 1 mg, 1 mg, Intramuscular, Q15 Min PRN, Jung Leonard MD  •  heparin (porcine) 5000 UNIT/ML injection 5,000 Units, 5,000 Units, Subcutaneous, Q12H, Jung Leonard MD, 5,000 Units at 06/30/22 2006  •  heparin (porcine) injection 4,000 Units, 4,000 Units, Intracatheter, PRN, Ace Lauren MD, 4,000 Units at 06/29/22 1114  •  heparin (porcine) injection 4,000 Units, 4,000 Units, Intracatheter, PRN, Ace Lauren MD  •  hydrALAZINE (APRESOLINE) injection 10 mg, 10 mg, Intravenous, Q6H PRN, Jung Leonard MD, 10 mg at 07/01/22 0034  •  Insulin Aspart (novoLOG) injection 0-7 Units, 0-7 Units, Subcutaneous, TID AC, Jung Leonard MD, 5 Units at 07/01/22 0828  •  Insulin Aspart (novoLOG) injection 8 Units, 8 Units, Subcutaneous, TID With Meals, Jerman Coffman MD, 8 Units at 07/01/22 0830  •  insulin detemir (LEVEMIR) injection 15 Units, 15 Units, Subcutaneous, Q12H, Jung Leonard MD, 15 Units at 07/01/22 0829  •  isosorbide mononitrate (IMDUR) 24 hr tablet 30 mg, 30 mg, Oral, Q24H, Jung Leonard MD  •  levothyroxine sodium injection 18.75 mcg, 18.75 mcg, Intravenous, Daily, Jerman Coffman MD  •  Linezolid (ZYVOX) 600 mg 300 mL, 600 mg, Intravenous, Q12H, Jung Leonard MD, Last Rate: 300 mL/hr at 06/30/22 2215, 600 mg at 06/30/22 2215  •  [START ON 7/2/2022] lisinopril (PRINIVIL,ZESTRIL) tablet 20 mg, 20 mg, Oral, Once per day on Sun Tue Thu Sat, Jung Leonard MD  •  magnesium sulfate 4 gram infusion - Mg less than or equal to 1mg/dL, 4 g, Intravenous, PRN **OR** magnesium sulfate 3 gram infusion (1gm x 3) - Mg 1.1 - 1.5 mg/dL, 1 g, Intravenous, PRN **OR** Magnesium Sulfate 2 gram infusion- Mg 1.6 - 1.9 mg/dL, 2 g, Intravenous, PRN, Charlene Castro MD, Last Rate: 25 mL/hr at 06/27/22 2104, 2 g at 06/27/22 2104  •  meropenem (MERREM) 1 g/100 mL 0.9% NS (mbp), 1 g, Intravenous, Q12H, Jung Leonard MD, 1 g at 07/01/22 4829  •  metoprolol tartrate  (LOPRESSOR) injection 5 mg, 5 mg, Intravenous, Q5 Min PRN, Froylan Lu MD, 5 mg at 06/26/22 1921  •  metoprolol tartrate (LOPRESSOR) tablet 25 mg, 25 mg, Oral, Q12H, Froylan Lu MD, 25 mg at 06/30/22 2008  •  morphine injection 1 mg, 1 mg, Intravenous, Q4H PRN, 1 mg at 06/30/22 2006 **AND** naloxone (NARCAN) injection 0.4 mg, 0.4 mg, Intravenous, Q5 Min PRN, Charlene Castro MD  •  pantoprazole (PROTONIX) injection 40 mg, 40 mg, Intravenous, Q24H, Charlene Castro MD, 40 mg at 07/01/22 0807  •  Pharmacy to Dose meropenem (MERREM), , Does not apply, Continuous PRN, Jung Leonard MD  •  [DISCONTINUED] sodium chloride 0.9 % infusion, 200 mL/hr, Intravenous, Continuous PRN, Stopped at 06/26/22 0130 **AND** potassium chloride 10 mEq in 100 mL IVPB, 10 mEq, Intravenous, Continuous PRN, Charlene Castro MD, Stopped at 06/26/22 0130  •  [DISCONTINUED] sodium chloride 0.45 % infusion, 150 mL/hr, Intravenous, Continuous PRN, Stopped at 06/26/22 0211 **AND** potassium chloride 10 mEq in 100 mL IVPB, 10 mEq, Intravenous, Continuous PRN, Charlene Castro MD, Stopped at 06/26/22 0212  •  sodium chloride 0.45 % with KCl 20 mEq/L infusion, 250 mL/hr, Intravenous, Continuous PRN, Charlene Castro MD  •  sodium chloride 0.9 % flush 10 mL, 10 mL, Intravenous, Q12H, Jerman Coffman MD  •  sodium chloride 0.9 % flush 10 mL, 10 mL, Intravenous, Q12H, Jerman Coffman MD  •  sodium chloride 0.9 % flush 10 mL, 10 mL, Intravenous, Q12H, Jerman Coffman MD, 10 mL at 07/01/22 0807  •  sodium chloride 0.9 % flush 10 mL, 10 mL, Intravenous, Augustus PARKS Andrew, MD  •  sodium chloride 0.9 % flush 20 mL, 20 mL, Intravenous, Augustus PARKS Andrew, MD     Diagnostic Data    Lab Results (last 24 hours)     Procedure Component Value Units Date/Time    POC Glucose Once [[id number]]  (Abnormal) Collected: 07/01/22 0827    Specimen: Blood Updated: 07/01/22 0908     Glucose 347 mg/dL      Comment: Result Not ConfirmedOperator:  [id number] PAT Blackburn ID: [id number]       Wound Culture - Wound, Leg, Left [[id number]] Collected: 06/29/22 0632    Specimen: Wound from Leg, Left Updated: 07/01/22 0855     Wound Culture No growth at 2 days     Gram Stain Few (2+) WBCs seen      No organisms seen    Blood Culture - Blood, Arm, Right [[id number]]  (Normal) Collected: 06/30/22 0746    Specimen: Blood from Arm, Right Updated: 07/01/22 0803     Blood Culture No growth at 24 hours    Blood Culture - Blood, Blood, Central Line [[id number]]  (Normal) Collected: 06/30/22 0746    Specimen: Blood, Central Line Updated: 07/01/22 0803     Blood Culture No growth at 24 hours    Comprehensive Metabolic Panel [[id number]]  (Abnormal) Collected: 07/01/22 0558    Specimen: Blood Updated: 07/01/22 0641     Glucose 289 mg/dL      BUN 37 mg/dL      Creatinine 3.67 mg/dL      Sodium 134 mmol/L      Potassium 4.1 mmol/L      Comment: Slight hemolysis detected by analyzer. Results may be affected.        Chloride 94 mmol/L      CO2 24.0 mmol/L      Calcium 9.3 mg/dL      Total Protein 7.3 g/dL      Albumin 3.00 g/dL      ALT (SGPT) <5 U/L      AST (SGOT) 10 U/L      Alkaline Phosphatase 144 U/L      Total Bilirubin 0.3 mg/dL      Globulin 4.3 gm/dL      A/G Ratio 0.7 g/dL      BUN/Creatinine Ratio 10.1     Anion Gap 16.0 mmol/L      eGFR 13.3 mL/min/1.73      Comment: <15 Indicative of kidney failure       Narrative:      GFR Normal >60  Chronic Kidney Disease <60  Kidney Failure <15      Magnesium [[id number]]  (Normal) Collected: 07/01/22 0558    Specimen: Blood Updated: 07/01/22 0635     Magnesium 1.9 mg/dL     CBC Auto Differential [[id number]]  (Abnormal) Collected: 07/01/22 0548    Specimen: Blood Updated: 07/01/22 0554     WBC 12.12 10*3/mm3      RBC 3.54 10*6/mm3      Hemoglobin 9.5 g/dL      Hematocrit 30.3 %      MCV 85.6 fL      MCH 26.8 pg      MCHC 31.4 g/dL      RDW 16.7 %      RDW-SD 52.3 fl      MPV 8.4 fL      Platelets 347 10*3/mm3      Neutrophil %  73.3 %      Lymphocyte % 12.4 %      Monocyte % 8.9 %      Eosinophil % 3.4 %      Basophil % 0.8 %      Immature Grans % 1.2 %      Neutrophils, Absolute 8.88 10*3/mm3      Lymphocytes, Absolute 1.50 10*3/mm3      Monocytes, Absolute 1.08 10*3/mm3      Eosinophils, Absolute 0.41 10*3/mm3      Basophils, Absolute 0.10 10*3/mm3      Immature Grans, Absolute 0.15 10*3/mm3      nRBC 0.0 /100 WBC     POC Glucose Once [[id number]]  (Abnormal) Collected: 06/30/22 1736    Specimen: Blood Updated: 07/01/22 0542     Glucose 238 mg/dL      Comment: Result Not ConfirmedOperator: [id number] FRANCISCO RICODIAHMeter ID: [id number]       Blood Gas, Arterial - [[id number]]  (Abnormal) Collected: 07/01/22 0407    Specimen: Arterial Blood Updated: 07/01/22 0409     Site Left Brachial     Shadi's Test N/A     pH, Arterial 7.423 pH units      pCO2, Arterial 43.2 mm Hg      pO2, Arterial 76.7 mm Hg      Comment: 84 Value below reference range        HCO3, Arterial 28.2 mmol/L      Comment: 83 Value above reference range        Base Excess, Arterial 3.4 mmol/L      Comment: 83 Value above reference range        O2 Saturation, Arterial 96.4 %      Barometric Pressure for Blood Gas 749 mmHg      Modality Ventilator     FIO2 30 %      Ventilator Mode SIMV     Set Tidal Volume 400     Set Mech Resp Rate 10.0     PEEP 5.0     PSV 12.0 cmH2O      PIP 18 cmH2O      Comment: Meter: [id number]     :  [id number]        Collected by [email or web address]    Blood Culture - Blood, Hand, Right [[id number]]  (Normal) Collected: 06/26/22 0210    Specimen: Blood from Hand, Right Updated: 07/01/22 0232     Blood Culture No growth at 5 days    POC Glucose Once [[id number]]  (Abnormal) Collected: 06/30/22 2004    Specimen: Blood Updated: 06/30/22 2015     Glucose 202 mg/dL      Comment: RN NotifiedOperator: [id number] RENATE CINDYMeter ID: [id number]       Urinalysis, Microscopic Only - Urine, Clean Catch [[id number]]  (Abnormal) Collected: 06/30/22 1607     Specimen: Urine, Clean Catch Updated: 06/30/22 1700     RBC, UA Too Numerous to Count /HPF      WBC, UA Too Numerous to Count /HPF      Bacteria, UA 4+ /HPF      Squamous Epithelial Cells, UA None Seen /HPF      Yeast, UA Large/3+ Budding Yeast /HPF      Hyaline Casts, UA 21-30 /LPF      Methodology Manual Light Microscopy    Urinalysis With Culture If Indicated - Urine, Clean Catch [[id number]]  (Abnormal) Collected: 06/30/22 1607    Specimen: Urine, Clean Catch Updated: 06/30/22 1619     Color, UA Dark Yellow     Appearance, UA Turbid     pH, UA 5.5     Specific Gravity, UA 1.020     Glucose,  mg/dL (1+)     Ketones, UA 15 mg/dL (1+)     Bilirubin, UA Negative     Blood, UA Large (3+)     Protein, UA >=300 mg/dL (3+)     Leuk Esterase, UA Large (3+)     Nitrite, UA Negative     Urobilinogen, UA 0.2 E.U./dL    Narrative:      In absence of clinical symptoms, the presence of pyuria, bacteria, and/or nitrites on the urinalysis result does not correlate with infection.    POC Glucose Once [[id number]]  (Abnormal) Collected: 06/30/22 1355    Specimen: Blood Updated: 06/30/22 1416     Glucose 231 mg/dL      Comment: Sliding Scale AdminOperator: [id number] FRANCISCO JEDADIAHMeter ID: [id number]       Respiratory Culture - Sputum, Oropharynx [[id number]] Collected: 06/30/22 1029    Specimen: Sputum from Oropharynx Updated: 06/30/22 1241     Gram Stain Many (4+) WBCs seen      Few (2+) Epithelial cells per low power field      Rare (1+) Mixed bacterial nataly          I reviewed the patient's new clinical results.    Assessment/Plan:     Active Hospital Problems    Diagnosis  POA   • **Altered mental status [R41.82]  Yes     Likely due to drug overdose.   - Initial ABG showed hypercapnia   - Repeat ABG today stable.   - Patient intubated on mechanical ventilation.  Tidal volume 8 cc/kg, [employer] managing vent settings   - Soft restraint order placed   - UDS is negative  - Failed SBT yesterday, on SBT currently breathing  spontaneously, following some commands. Opens eyes spontaneously. No meaningfully movements. Will respond appropriately to questions by blinking.   - plan to attempt SBT again today after HD  - Monitor labs and vitals         • Type 2 diabetes mellitus with autonomic neuropathy (HCC) [E11.43]  Yes     Priority: Medium     On low SSI  on Levemir 15 units q12hr  On 8u mealtime  Will slowly transition to home dose, levemir 25 units nightly and 12 units TID short acting as needed  Patient is still intubated therefore there is concern for stress induced hyperglycemia, additionally now receiving Tube feeds.   Will adjust insulin regimen appropriately      • Generalized abdominal pain [R10.84]  Unknown     Could be due to initiation of tube feeds vs dyspepsia vs abdominal cramps related to no PO intake due to intubation vs constipation  Continue current laxative regiment   If no bowel movement by this afternoon will consider enema     • Ventilator associated pneumonia (HCC) [J95.851]  Yes     CXR on 6/30/22 showed evidence of pneumonia with elevated Procal and CRP  On ventilatory support since admission over the last 5 days   Discontinued Cefepime on 6/30, due to worsening clinical course and started on Meropenum 6/30 for better coverage  No fever overnight  blood, urine cultures ordered, respiratory culture pending       • Accidental drug overdose [T50.901A]  Yes     Reports of 10 tablets of 10 mg of Flexeril ingested.  - Overdose labs obtained including UDS, acetaminophen, salicylate, ethanol level  - EKG showed right bundle branch block  - Poison control notified they Recommend symptomatic care  - Monitor Qtc interval changes  - Continues to be sinus tachy when performing SBTs        • On mechanically assisted ventilation (HCC) [Z99.11]  Not Applicable     Vent management and sedation orders placed.   - [facility] intensivist group consulted for vent management appreciate recommendations        • Pleural effusion on  right [J90]  Yes     CXR on 6/30/22 showed a small upper left pulmonary edema vs early pneumonia.   Last Echo 1/2022 EF 61-65 %  Continue to monitor   Plan to recheck procal and CRP at 48 hours from previous       • Acute respiratory failure with hypoxia and hypercapnia (HCC) [J96.01, J96.02]  Yes   • Anemia [D64.9]  Yes     Hgb 6.9 on admission   - Type and screen   - Monitor H &H   - Intubated      • ESRD on hemodialysis (HCC) [N18.6, Z99.2]  Not Applicable     -Receives hemodialysis MWF at [facility] in [place]  Missed dialysis per family prior to arrival   Nephrology consulted appreciated recs          • Acute cystitis with hematuria [N30.01]  Unknown     Fever of 100.6 on 6/30  UA showed 3+ blood, 3+ leuk esterase, Toshia +4  On meropenum  On linezolid     • Hypothyroidism [E03.9]  Yes     -home synthroid 25 mcg  -will start IV synthroid and give PO once extubated and tolerating PO intake     • Hypertension [I10]  Yes     On home metoprolol and imdur  Holding lisinopril for now  BP stable  - PRN Hydralazine       • Coronary artery disease involving native coronary artery of native heart without angina pectoris [I25.10]  Yes     - S/P CABG, Bilateral carotid artery stenosis w/ 2 stents on left side,  PAD (peripheral artery disease) with stent in right upper leg  - Cardiology on board       • COPD (chronic obstructive pulmonary disease) ([facility]) [J44.9]  Yes       Code status is   Code Status and Medical Interventions:   Ordered at: 06/25/22 6027     Level Of Support Discussed With:    Patient     Code Status (Patient has no pulse and is not breathing):    CPR (Attempt to Resuscitate)     Medical Interventions (Patient has pulse or is breathing):    Full Support       Prognosis: fair  Plan for disposition:Where: home health, SNF and rehab and When:  5-7days    Time: Critical care 30 min      This document has been electronically signed by Perez Hooker MD on July 1, 2022 09:55 CDT

## 2022-07-01 NOTE — CONSULTS
SUBJECTIVE:   7/1/2022    Name: Yamileth Slater  DOD: 1960    REASON FOR CONSULT: Heme positive stool    Chief Complaint:     Chief Complaint   Patient presents with   • Altered Mental Status   • Possible Overdose       Subjective     Patient is 62 y.o. female with past medical history of end-stage renal disease on dialysis, diabetes mellitus, morbid obesity, coronary artery disease s/p PTCA, hyperlipidemia, COPD, hypothyroidism, anemia presents with altered mental status with Flexeril in addition.  She was noted to have pneumonia.  Had anemia as well upon admission and required packed red cell transfusion.  Hemoglobin has stabilized since then.  Noted to have heme positive stool today as she had a bowel movement after several days.  Hemoglobin today is 9.5.  Patient is intubated and unable to provide any history.  No history of rectal bleeding or melena per nursing staff.  EGD in January was consistent with a hiatal hernia and duodenal AVM.     ROS/HISTORY/ CURRENT MEDICATIONS/OBJECTIVE/VS/PE:   Review of Systems:   Review of Systems   Unable to perform ROS: Intubated       History:     Past Medical History:   Diagnosis Date   • Acute blood loss anemia 4/16/2017    Likely due to gastric oozing at this time. - Dr. Duarte (GI) was consulted and has now signed off, will follow up outpatient - pill colonoscopy showed AVMs - continue to monitor   • Acute cystitis with hematuria 3/31/2021    1/13: IV Rocephin 1 gm q 24 1/14 : transitioned  to omnicef 300mg. Urine cultures resulted and did not show growth. Omnicef discontinued as patient is asymptomatic   • Altered mental status 1/9/2022    - AMS on presentation - initial ABG pH 7.3, CO2 34 - Procal 0.29 - UA negative for acute cysitits -CTA head wnl  - Empiric Zosyn and Vancomycin -Lactate 2.5 on admission  - blood cultures no growth at 24 hours     • Anxiety    • CAD (coronary artery disease) 4/24/2021    S/P 3 stents 5/1/2021 for [facility] Continue ASA 81mg &  Clopidogrel 75mg Continue Atorvastatin 40mg   • Carotid artery stenosis    • Chronic obstructive lung disease ([facility])    • CKD (chronic kidney disease) stage 4, GFR 15-29 ml/min ([facility])    • CKD (chronic kidney disease), symptom management only, stage 5 ([facility]) 10/5/2020    Results from last 7 days Lab Units 12/15/21 0548 12/14/21 1323 12/14/21 0916 CREATININE mg/dL 3.92* 3.21* 3.32*  Baseline creatinine 2-3 GFR 13-25 GFR 15 Dialysis MWF, sees Dr. Lauren Nephrology consult,, appreciate recommendations Continue Bumex 1mg bid daily Holding Bumex 2mg 4 times a week   • Colonic polyp    • Coronary arteriosclerosis    • Diabetes mellitus ([facility])    • Diabetic neuropathy ([facility])    • Ear pain, right 10/18/2021    - canal trauma due to patient scratching and DMT2 - added cortisporin ear drops   • Elevated troponin 10/12/2021    -most likely from CKD -Trending down -Neg chest pain   • GERD (gastroesophageal reflux disease)    • GI bleed 5/13/2021    - GI will follow up outpatient - Protonix 40mg daily - Avoid medical DVT prophy and use mechanical at this time instead. - Continue to monitor - pill colonoscopy results showed AVMs   • History of transfusion    • Hypercholesterolemia    • Hyperosmolar hyperglycemic state (HHS) ([facility]) 6/25/2022    Serum glucose 605 on admission  Anion gap 16 PH 7.37 Bicarb 27.9 Continue fluids  Insulin drip with HHS protocol  Anion gap closed around 10 AM, received one dose of Levamir subq, will stop insulin drip after 2 hrs     • Hypertension    • Hypokalemia 5/27/2022    Will replace as needed. Will be cautious in the setting of ESRD to avoid need for emergency dialysis   Monitor Qtc intervals on EKG     • Hypomagnesemia 6/27/2021    Monitor and replace   • Morbid obesity ([facility])    • Nephrolithiasis    • Peripheral vascular disease ([facility])    • SIRS (systemic inflammatory response syndrome) ([facility]) 1/9/2022    Admission  - WBC 17.78   -   - RR 16 - 1/10: VSS/wnl - CXR - Mild pulm edema - Blood cultures  no growth at 24 hours  - Procalcitonin 0.29 - UA : glucose 1000, negative Leucocytes/nitrate - Empiric Zosyn and Vancomcyin    • Sleep apnea    • Substance abuse (HCC)    • Vitamin D deficiency      Past Surgical History:   Procedure Laterality Date   • CARDIAC CATHETERIZATION N/A 7/14/2020   • CARDIAC CATHETERIZATION N/A 4/23/2021    Procedure: Left Heart Cath;  Surgeon: Melba Romo MD;  Location: [facility] CATH INVASIVE LOCATION;  Service: Cardiology;  Laterality: N/A;   • CARDIAC CATHETERIZATION N/A 4/30/2021    Procedure: Percutaneous Coronary Intervention;  Surgeon: Russell Voss MD;  Location: [facility] CATH INVASIVE LOCATION;  Service: Cardiovascular;  Laterality: N/A;   • CARDIAC CATHETERIZATION N/A 4/30/2021    Procedure: Stent NIKKI coronary;  Surgeon: Russell Voss MD;  Location: [facility] CATH INVASIVE LOCATION;  Service: Cardiovascular;  Laterality: N/A;   • CARDIAC CATHETERIZATION Left 11/13/2021    Procedure: Left Heart Cath;  Surgeon: Niall Rios MD;  Location: [facility] CATH INVASIVE LOCATION;  Service: Cardiology;  Laterality: Left;   • CAROTID STENT Left    • COLONOSCOPY     • COLONOSCOPY N/A 5/14/2021    Procedure: COLONOSCOPY;  Surgeon: Mingo Duaret MD;  Location: [facility] ENDOSCOPY;  Service: Gastroenterology;  Laterality: N/A;   • CORONARY ARTERY BYPASS GRAFT N/A 2013    CABG X 3   • CYSTOSCOPY BLADDER STONE LITHOTRIPSY Bilateral    • ENDOSCOPY N/A 4/12/2021    Procedure: ESOPHAGOGASTRODUODENOSCOPY;  Surgeon: Mingo Duarte MD;  Location: [facility] ENDOSCOPY;  Service: Gastroenterology;  Laterality: N/A;   • ENDOSCOPY N/A 5/14/2021    Procedure: ESOPHAGOGASTRODUODENOSCOPY;  Surgeon: Mingo Duarte MD;  Location: [facility] ENDOSCOPY;  Service: Gastroenterology;  Laterality: N/A;   • ENDOSCOPY N/A 1/28/2022    Procedure: ESOPHAGOGASTRODUODENOSCOPY;  Surgeon: Mingo Duarte MD;  Location: [facility] ENDOSCOPY;  Service: Gastroenterology;  Laterality: N/A;   •  INTERVENTIONAL RADIOLOGY PROCEDURE N/A 10/21/2021    Procedure: tunneled central venous catheter placement;  Surgeon: Donnie Robles MD;  Location: [facility] ANGIO INVASIVE LOCATION;  Service: Interventional Radiology;  Laterality: N/A;   • INTERVENTIONAL RADIOLOGY PROCEDURE N/A 2022    Procedure: tunneled central venous catheter placement;  Surgeon: Donnie Robles MD;  Location: [facility] ANGIO INVASIVE LOCATION;  Service: Interventional Radiology;  Laterality: N/A;     Family History   Problem Relation Age of Onset   • Heart disease Mother    • Lung cancer Mother    • Heart disease Father    • Heart attack Father    • Diabetes Father    • Heart disease Half-Sister         Dad's side   • Heart disease Brother    • No Known Problems Sister    • No Known Problems Sister    • No Known Problems Sister    • No Known Problems Sister    • No Known Problems Sister    • Pancreatic cancer Half-Sister         Dad's side   • No Known Problems Brother    • No Known Problems Brother    • Heart attack Half-Brother    • Heart attack Half-Brother    • No Known Problems Maternal Grandmother    • No Known Problems Maternal Grandfather    • No Known Problems Paternal Grandmother    • No Known Problems Paternal Grandfather      Social History     Tobacco Use   • Smoking status: Former Smoker     Packs/day: 0.25     Years: 46.00     Pack years: 11.50     Types: Cigarettes     Start date: 1999     Quit date: 2/15/2022     Years since quittin.3   • Smokeless tobacco: Never Used   • Tobacco comment: only smoking 5 a day - quit 2021   Substance Use Topics   • Alcohol use: No   • Drug use: Not Currently     Types: LSD, Marijuana, Methamphetamines     Medications Prior to Admission   Medication Sig Dispense Refill Last Dose   • acetaminophen (Tylenol 8 Hour) 650 MG 8 hr tablet Take 1 tablet by mouth Every 8 (Eight) Hours As Needed for Mild Pain . 90 tablet 0    • albuterol (PROVENTIL) (2.5 MG/3ML) 0.083%  "nebulizer solution Inhale the contents of 1 vial by nebulization Every 4 (Four) Hours As Needed for Wheezing. 75 mL 3    • albuterol sulfate  (90 Base) MCG/ACT inhaler Inhale 2 puffs Every 4 (Four) Hours As Needed for Wheezing. 18 g 1    • aspirin (aspirin) 81 MG EC tablet Take 1 tablet by mouth Daily. 60 tablet 5    • atorvastatin (LIPITOR) 20 MG tablet Take 1 tablet by mouth every night at bedtime. 90 tablet 0    • Blood Glucose Monitoring Suppl ([employer] Blood Glucose Meter) w/Device kit 1 each 3 (Three) Times a Day. 1 kit 3    • bumetanide (BUMEX) 2 MG tablet Take 1 tablet by mouth 4 (Four) Times a Week. Take on non-hemodialysis days. 16 tablet 0    • Capsaicin 0.035 % cream Apply 3 g topically 3 (Three) Times a Day As Needed (ankle pain). 42.5 g 2    • Cetirizine HCl 10 MG capsule Take 1 capsule by mouth Daily.      • clopidogrel (PLAVIX) 75 MG tablet Take 1 tablet by mouth Daily. 30 tablet 5    • Continuous Blood Gluc  (FreeStyle Jade 2 [place]) device 1 each Continuous. 1 each 0    • Continuous Blood Gluc Sensor (FreeStyle Jade 2 Sensor) misc 1 each Every 14 (Fourteen) Days. 2 each 11    • cyclobenzaprine (FLEXERIL) 5 MG tablet Take 2 tablets by mouth At Night As Needed for Muscle Spasms. 90 tablet 0    • Diclofenac Sodium (VOLTAREN) 1 % gel gel Apply 4 g topically to the appropriate area as directed 4 (Four) Times a Day As Needed (Ankle pain). 350 g 2    • DULoxetine (CYMBALTA) 20 MG capsule Take 2 capsules by mouth Daily for 90 days. 180 capsule 0    • Easy Touch Insulin Syringe 30G X 5/16\" 0.5 ML misc USE AS DIRECTED WITH LEVEMIR      • EASY TOUCH PEN NEEDLES 31G X 8 MM misc       • gabapentin (NEURONTIN) 300 MG capsule Take 1 capsule by mouth Daily. And an extra pill M/W/F - dialysis days 45 capsule 2    • glucose blood test strip Use as instructed 100 each 12    • insulin detemir (LEVEMIR) 100 UNIT/ML injection Inject 25 Units under the skin into the appropriate area as directed Every Night. 3 " mL 12    • Insulin Lispro, 1 Unit Dial, (HUMALOG) 100 UNIT/ML solution pen-injector Inject 12 Units under the skin into the appropriate area as directed 3 (Three) Times a Day With Meals. 30 mL 12    • Insulin Pen Needle (NovoFine) 30G X 8 MM misc As directed 4 times daily 100 each 11    • Insulin Pen Needle 31G X 8 MM misc Use to inject insulin 4 (Four) Times a Day as directed. 120 each 12    • ipratropium-albuterol (DUO-NEB) 0.5-2.5 mg/3 ml nebulizer Take 3 mL by nebulization 4 (Four) Times a Day.      • isosorbide mononitrate (IMDUR) 30 MG 24 hr tablet Take 1 tablet by mouth Every Morning. 90 tablet 1    • levothyroxine (SYNTHROID, LEVOTHROID) 25 MCG tablet Take 25 mcg by mouth Daily.      • lisinopril (PRINIVIL,ZESTRIL) 20 MG tablet Take 1 tablet by mouth Daily. 90 tablet 0    • Methoxy PEG-Epoetin Beta (MIRCERA IJ) 150 mcg Every 14 (Fourteen) Days.      • Methoxy PEG-Epoetin Beta (MIRCERA IJ) 225 mcg Every 14 (Fourteen) Days.      • metoprolol tartrate (LOPRESSOR) 25 MG tablet Take 1 tablet by mouth Every 12 (Twelve) Hours. 180 tablet 0    • montelukast (SINGULAIR) 10 MG tablet Take 1 tablet by mouth Every Night. 90 tablet 0    • muscle rub (BenGay) 10-15 % cream cream Apply 1 application topically to the appropriate area as directed 4 (Four) Times a Day As Needed for buttock pain. 85 g 1    • nitroglycerin (NITROSTAT) 0.4 MG SL tablet Place 0.4 mg under the tongue Every 5 (Five) Minutes As Needed for Chest Pain (x 3 doses).      • O2 (OXYGEN) Inhale 3 L/min Continuous.      • ondansetron ODT (Zofran ODT) 4 MG disintegrating tablet Place 1 tablet on the tongue Every 8 (Eight) Hours As Needed for Nausea or Vomiting. 30 tablet 0    • oxybutynin XL (DITROPAN-XL) 5 MG 24 hr tablet Take 1 tablet by mouth Daily. 90 tablet 0    • pantoprazole (PROTONIX) 40 MG EC tablet Take 1 tablet by mouth Every Night. 90 tablet 0    • promethazine (PHENERGAN) 25 MG suppository Insert  into the rectum Every 6 (Six) Hours.      •  ranolazine (RANEXA) 500 MG 12 hr tablet Take 1 tablet by mouth Every 12 (Twelve) Hours. 180 tablet 0    • Semaglutide (Rybelsus) 7 MG tablet Take 7 mg by mouth Daily. 90 tablet 0    • sevelamer (RENVELA) 800 MG tablet Take 800 mg by mouth 3 (Three) Times a Day With Meals.      • sodium bicarbonate 650 MG tablet Take 1 tablet by mouth.        Allergies:  Adhesive tape, Latex, Nsaids, and Other    I have reviewed the patient's medical history, surgical history and family history in the available medical record system.     Current Medications:     Current Facility-Administered Medications   Medication Dose Route Frequency Provider Last Rate Last Admin   • albumin human 25 % IV SOLN 12.5 g  12.5 g Intravenous PRN Ace Lauren MD   12.5 g at 07/01/22 1035   • sennosides-docusate (PERICOLACE) 8.6-50 MG per tablet 2 tablet  2 tablet Oral BID Charlene Castro MD   2 tablet at 07/01/22 1248    And   • polyethylene glycol (MIRALAX) packet 17 g  17 g Oral Daily PRN Charlene Castro MD   17 g at 07/01/22 1249    And   • bisacodyl (DULCOLAX) EC tablet 5 mg  5 mg Oral Daily PRN Charlene Castro MD   5 mg at 06/29/22 1715    And   • bisacodyl (DULCOLAX) suppository 10 mg  10 mg Rectal Daily PRN Charlene Castro MD       • bumetanide (BUMEX) injection 2 mg  2 mg Intravenous Daily Ace Lauren MD   2 mg at 07/01/22 0807   • chlorhexidine (PERIDEX) 0.12 % solution 15 mL  15 mL Mouth/Throat Q12H Charlene Castro MD   15 mL at 07/01/22 0808   • dexmedetomidine (PRECEDEX) 400 mcg in 100 mL NS infusion  0.2-1.5 mcg/kg/hr Intravenous Titrated Cintia Montez MD 12.5 mL/hr at 07/01/22 1551 0.4 mcg/kg/hr at 07/01/22 1551   • dextrose (D50W) (25 g/50 mL) IV injection 10-50 mL  10-50 mL Intravenous Q15 Min PRN Charlene Castro MD       • dextrose (D50W) (25 g/50 mL) IV injection 25 g  25 g Intravenous Q15 Min PRN Jung Leonard MD       • dextrose (GLUTOSE) oral gel 15 g  15 g Oral Q15 Min PRN Jung Leonard MD        • [START ON 7/4/2022] epoetin hubert (EPOGEN,PROCRIT) injection 6,000 Units  6,000 Units Intravenous Once per day on Mon Wed Fri Ace Lauren MD       • gabapentin (NEURONTIN) 50 mg/mL solution 300 mg  300 mg Oral Daily Jerman Coffman MD   300 mg at 07/01/22 1248   • glucagon (human recombinant) (GLUCAGEN DIAGNOSTIC) injection 1 mg  1 mg Intramuscular Q15 Min PRN Jung Leonard MD       • heparin (porcine) injection 4,000 Units  4,000 Units Intracatheter PRN Ace Lauren MD   4,000 Units at 07/01/22 1237   • heparin (porcine) injection 4,000 Units  4,000 Units Intracatheter PRN Ace Lauren MD       • hydrALAZINE (APRESOLINE) injection 10 mg  10 mg Intravenous Q6H PRN Jung Leonard MD   10 mg at 07/01/22 0034   • Insulin Aspart (novoLOG) injection 0-7 Units  0-7 Units Subcutaneous TID AC Jung Leonard MD   3 Units at 07/01/22 1703   • Insulin Aspart (novoLOG) injection 8 Units  8 Units Subcutaneous TID With Meals Jerman Coffman MD   8 Units at 07/01/22 1703   • insulin detemir (LEVEMIR) injection 15 Units  15 Units Subcutaneous Q12H Jung Leonard MD   15 Units at 07/01/22 0829   • isosorbide mononitrate (IMDUR) 24 hr tablet 30 mg  30 mg Oral Q24H Jung Leonard MD   30 mg at 07/01/22 1248   • levothyroxine sodium injection 18.75 mcg  18.75 mcg Intravenous Daily Jerman Coffman MD   18.75 mcg at 07/01/22 1248   • Linezolid (ZYVOX) 600 mg 300 mL  600 mg Intravenous Q12H Jung Leonard  mL/hr at 07/01/22 1252 600 mg at 07/01/22 1252   • [START ON 7/2/2022] lisinopril (PRINIVIL,ZESTRIL) tablet 20 mg  20 mg Oral Once per day on Sun Tue Thu Sat Jung Leonard MD       • magnesium sulfate 4 gram infusion - Mg less than or equal to 1mg/dL  4 g Intravenous PRN Charlene Castro MD        Or   • magnesium sulfate 3 gram infusion (1gm x 3) - Mg 1.1 - 1.5 mg/dL  1 g Intravenous PRN Charlene Castro MD        Or   • Magnesium Sulfate 2 gram infusion- Mg 1.6 - 1.9 mg/dL  2 g Intravenous PRN Charlene Castro MD 25  mL/hr at 07/01/22 1617 2 g at 07/01/22 1617   • [START ON 7/2/2022] meropenem (MERREM) 1 g/100 mL 0.9% NS (mbp)  1 g Intravenous Q24H Jerman Coffman MD       • metoprolol tartrate (LOPRESSOR) injection 5 mg  5 mg Intravenous Q5 Min PRN Froylan Lu MD   5 mg at 06/26/22 1921   • metoprolol tartrate (LOPRESSOR) tablet 25 mg  25 mg Oral Q12H Froylan Lu MD   25 mg at 07/01/22 1249   • morphine injection 1 mg  1 mg Intravenous Q4H PRN Charlene Castro MD   1 mg at 06/30/22 2006    And   • naloxone (NARCAN) injection 0.4 mg  0.4 mg Intravenous Q5 Min PRN Charlene Castro MD       • pantoprazole (PROTONIX) injection 40 mg  40 mg Intravenous Q24H Charlene Castro MD   40 mg at 07/01/22 0807   • Pharmacy to Dose meropenem (MERREM)   Does not apply Continuous PRN Jung Leonard MD       • potassium chloride 10 mEq in 100 mL IVPB  10 mEq Intravenous Continuous PRN Charlene Castro MD   Stopped at 06/26/22 0130   • potassium chloride 10 mEq in 100 mL IVPB  10 mEq Intravenous Continuous PRN Charlene Castro MD   Stopped at 06/26/22 0212   • sodium chloride 0.9 % flush 10 mL  10 mL Intravenous Q12H Jerman Coffman MD       • sodium chloride 0.9 % flush 10 mL  10 mL Intravenous Q12H Jerman Coffman MD       • sodium chloride 0.9 % flush 10 mL  10 mL Intravenous Q12H Jerman Coffman MD   10 mL at 07/01/22 0807   • sodium chloride 0.9 % flush 10 mL  10 mL Intravenous PRN Jerman Coffman MD       • sodium chloride 0.9 % flush 20 mL  20 mL Intravenous PRN Jerman Coffman MD           Objective     Physical Exam:   Temp:  [96.6 °F (35.9 °C)-98.8 °F (37.1 °C)] 97.2 °F (36.2 °C)  Heart Rate:  [63-87] 76  Resp:  [16-31] 16  BP: (102-201)/(53-86) 121/60  FiO2 (%):  [30 %] 30 %    Physical Exam:  General Appearance:   Intubated, in no acute distress   Head:    Normocephalic, without obvious abnormality, atraumatic   Eyes:            Lids and lashes normal, conjunctivae and sclerae normal, no   icterus, no pallor,  corneas clear, PERRLA   Ears:    Ears appear intact with no abnormalities noted   Throat:   No oral lesions, no thrush, oral mucosa moist   Neck:   No adenopathy, supple, trachea midline, no thyromegaly, no     carotid bruit, no JVD   Back:     No kyphosis present, no scoliosis present, no skin lesions,       erythema or scars, no tenderness to percussion or                   palpation,   range of motion normal   Lungs:     Clear to auscultation,respirations regular, even and                   unlabored    Heart:    Regular rhythm and normal rate, normal S1 and S2, no            murmur, no gallop, no rub, no click   Breast Exam:    Deferred   Abdomen:     Normal bowel sounds, no masses, no organomegaly, soft        nontender, nondistended, no guarding, no rebound                 tenderness   Genitalia:    Deferred   Extremities:   Moves all extremities well, no edema, no cyanosis, no              redness   Pulses:   Pulses palpable and equal bilaterally   Skin:   No bleeding, bruising or rash   Lymph nodes:   No palpable adenopathy   Neurologic:   Cranial nerves 2 - 12 grossly intact, sensation intact, DTR        present and equal bilaterally      Results Review:     Lab Results   Component Value Date    WBC 12.12 (H) 07/01/2022    WBC 11.78 (H) 06/30/2022    WBC 10.44 06/29/2022    HGB 9.5 (L) 07/01/2022    HGB 10.0 (L) 06/30/2022    HGB 8.6 (L) 06/29/2022    HCT 30.3 (L) 07/01/2022    HCT 31.8 (L) 06/30/2022    HCT 27.7 (L) 06/29/2022     07/01/2022     06/30/2022     06/29/2022     Results from last 7 days   Lab Units 07/01/22  0558 06/30/22  0601 06/29/22  0635   ALK PHOS U/L 144* 148* 137*   ALT (SGPT) U/L <5 <5 <5   AST (SGOT) U/L 10 14 7     Results from last 7 days   Lab Units 07/01/22  0558 06/30/22  0601 06/29/22  0635   BILIRUBIN mg/dL 0.3 0.4 0.4   ALK PHOS U/L 144* 148* 137*     No results found for: LIPASE  Lab Results   Component Value Date    INR 1.11 06/25/2022    INR 1.10  "01/09/2022    INR 1.04 12/27/2021         Radiology Review:  Imaging Results (Last 72 Hours)     Procedure Component Value Units Date/Time    XR Abdomen KUB [[id number]] Collected: 06/30/22 1849     Updated: 07/01/22 1600    Addenda:         ADDENDUM   ADDENDUM #1       Addendum report: Received question as to whether or not \"tube is  in the right place\". Since tube is not entirely visualized on  this study exact position of the tube is difficult to ascertain.  As stated in the report follow-up is recommended    Electronically signed by:  Pablo Rubio MD  7/1/2022 3:58 PM CDT  Workstation: [id number]    Signed: 07/01/22 1558 by Pablo Rubio MD    Narrative:      INDICATION: Cortrak placement, R41.82 Altered mental status,  unspecified J96.00 Acute respiratory failure, unspecified whether  with hypoxia or hypercapnia J81.0 Acute pulmonary edema N18.6 End  stage renal disease R73.9 Hyperglycemia, unspecified    EXAMINATION/TECHNIQUE: Portable view upper abdomen    COMPARISON: None.  ____________________________________________    FINDINGS:    Feeding tube is positioned well below the diaphragm. The images  do not include the right upper quadrant. The feeding tube crosses  the midline and appears to curve into the right upper quadrant.  It may be positioned near the gastric antrum. Follow-up is  recommended      Impression:      The entire distal feeding tube is not included on this single  portable image. Tip appears to project near the gastric antrum    Electronically signed by:  Pablo Rubio MD  6/30/2022 7:45 PM CDT  Workstation: [id number]    XR Chest 1 View [[id number]] Resulted: 07/01/22 0519     Updated: 07/01/22 0600    XR Abdomen KUB [[id number]] Collected: 06/30/22 2100     Updated: 06/30/22 2153    Narrative:      EXAM:    XR Abdomen, 1 View    CLINICAL HISTORY:    The patient is 62 years old and is Female; Feeding tube  placement, R41.82 Altered mental status, unspecified J96.00 Acute  respiratory failure, " unspecified whether with hypoxia or  hypercapnia J81.0 Acute pulmonary edema N18.6 End stage renal  disease R73.9 Hyperglycemia, unspecified    TECHNIQUE:    Frontal supine view of the abdomen/pelvis.    COMPARISON:    exam earlier today    FINDINGS:    GASTROINTESTINAL TRACT:  Unremarkable.  No dilation.    BONES/JOINTS:  Moderate DJD.    TUBES, LINES AND DEVICES:  Feeding tube extends into the  proximal duodenum.      Impression:        Feeding tube extends into the proximal duodenum.    Electronically signed by:  Nirmal Jimenez MD  6/30/2022 9:51 PM  CDT Workstation: [id number]    CT Abdomen Pelvis Without Contrast [[id number]] Collected: 06/30/22 1212     Updated: 06/30/22 1632    Narrative:      CT ABDOMEN PELVIS WITHOUT CONTRAST    INDICATION: concern for abscess vs diverticulitis, R41.82 Altered  mental status, unspecified J96.00 Acute respiratory failure,  unspecified whether with hypoxia or hypercapnia J81.0 Acute  pulmonary edema N18.6 End stage renal disease R73.9  Hyperglycemia, unspecified    COMPARISON: CT abdomen pelvis without contrast 12/18/2020    TECHNIQUE: Spiral CT images were acquired of the abdomen and  pelvis with multiplanar reconstructions without the  administration of oral or IV contrast.    CONTRAST: None    FINDINGS:  Limited evaluation due to patient body habitus with contact with  the CT gantry producing extensive streak artifact.  INFERIOR THORAX: Coronary atherosclerosis or stents.  Cardiomegaly. Sternotomy wires. Small right pleural effusion.  Groundglass opacities are present at bilateral lung bases with  consolidative opacities and bandlike opacities.  LIVER: Granulomas.  GALLBLADDER: There is haziness surrounding the gallbladder which  could also relate to motion artifact or streak artifact. There  are possible gallstones.  SPLEEN: Granulomas.  PANCREAS: Unremarkable  ADRENAL GLANDS: Unremarkable  KIDNEYS: Mild bilateral perinephric stranding.  URETERS: Unremarkable  BLADDER:  Decompressed with Forman  REPRODUCTIVE: Hysterectomy  GASTROINTESTINAL: Nasogastric tube with tip terminating in  stomach. Unremarkable appendix.  VASCULAR: Severe calcific atherosclerosis.  LYMPH NODES: No lymphadenopathy per CT criteria.  PERITONEUM/RETROPERITONEUM: Unremarkable.   OSSEOUS STRUCTURES: Degenerative change bilateral SI joints.  Straightening of the lumbar lordosis with lumbar spondylosis and  degenerative disc disease.  SOFT TISSUES: Unremarkable      Impression:      1. No evidence of abscess or diverticulitis.  2. Haziness surrounding the gallbladder in a region of the scan  that it exhibits motion artifact. This could relate to motion or  streak artifact. However if there is concern for possible  cholecystitis further evaluation could be obtained with  gallbladder ultrasound or HIDA.  3. Possible cholelithiasis.  4. Cardiomegaly.  5. Small right pleural effusion.  6. Groundglass opacities, consolidative opacities, and bandlike  opacities at the bilateral lung bases, which could represent  pneumonia, atelectasis, or edema.   7. Degenerative change bilateral SI joints.   8. Lumbar spondylosis.  9. Degenerative disc disease.    Electronically signed by:  Yono Malloy MD  6/30/2022 4:30 PM CDT  Workstation: [id number]    XR Chest 1 View [[id number]] Collected: 06/30/22 0519     Updated: 06/30/22 0810    Narrative:        PROCEDURE: Single chest view portable    REASON FOR EXAM:intubated, R41.82 Altered mental status,  unspecified J96.00 Acute respiratory failure, unspecified whether  with hypoxia or hypercapnia J81.0 Acute pulmonary edema N18.6 End  stage renal disease R73.9 Hyperglycemia, unspecified    FINDINGS: Comparison exam dated June 28, 2022. Postsurgical  changes with median sternotomy. ET tube with tip 3.29 cm above  valentina. NG tube with tip below level of diaphragm. Left jugular  central venous catheter with tip in the region of the SVC. Right  jugular double lumen large bore central venous  catheter with tip  in the SVC. Cardiomegaly. Vague left upper lung field perihilar  interstitial groundglass opacity. Lungs are otherwise clear..  Pleural spaces are normal. No acute osseous abnormality.      Impression:      1.  Tubes and lines as described above.  2.  Cardiomegaly.  3.  Vague left upper lung field perihilar interstitial  groundglass opacity suspicious for very mild atypical pulmonary  edema versus early pneumonia.    Electronically signed by:  Young Johnson MD  6/30/2022 8:08 AM CDT  Workstation: [id number]    XR Tibia Fibula 2 View Left [[id number]] Collected: 06/29/22 0930     Updated: 06/29/22 1942    Narrative:      EXAM:  XR TIBIA FIBULA 2 VIEWS    ORDERING PROVIDER:  DAVID ALMENDAREZ    CLINICAL HISTORY:  Osteomyelitis assessment    COMPARISON STUDY:      TECHNIQUE:  Two views of the left lower leg    FINDINGS:  There is no acute displaced fracture.  The alignment is anatomic.   No soft tissue abnormality, or radiopaque foreign body is seen.   There is no joint effusion.  No definite osteolysis  Scattered vascular clips in the leg      Impression:      No acute displaced fracture, or traumatic subluxation based on  current assessment.  No definite osteolysis to indicate radiographic evidence of  osteomyelitis.  If there is concern for subtle involvement due to osteomyelitis,  correlation with MRI or triple phase bone scan is recommended due  to suboptimal sensitivity of radiographic assessment.     Electronically signed by:  Richmond Bernal MD  6/29/2022 7:40 PM CDT  Workstation: [id number]    XR Abdomen KUB [[id number]] Collected: 06/29/22 0935     Updated: 06/29/22 1600    Narrative:      RADIOGRAPH ABDOMEN KUB    INDICATION: assess stool burden, R41.82 Altered mental status,  unspecified J96.00 Acute respiratory failure, unspecified whether  with hypoxia or hypercapnia J81.0 Acute pulmonary edema N18.6 End  stage renal disease R73.9 Hyperglycemia, unspecified    COMPARISON: KUB  10/15/2021    TECHNIQUE: Single frontal radiograph was obtained of the  mid/lower abdomen and pelvis.    FINDINGS:  Due to the patient's size, only the mid and left abdomen were  imaged. The right colon is not visualized. There is a nasogastric  feeding tube terminating in the stomach. Bowel gas pattern is  unremarkable. Penetration to x-rays limited due to the patient's  size. There is no significant amount of stool visualized in the  transverse, descending, sigmoid colon or rectum. There is severe  aortoiliac atherosclerosis. There is osseous degenerative change.      Impression:      1. Ascending colon not imaged due to patient size. Given these  limitations, no significant stool visualized in transverse,  descending, sigmoid colon or rectum.  2. Severe aortoiliac atherosclerosis.    Electronically signed by:  Yoon Malloy MD  6/29/2022 3:57 PM CDT  Workstation: [id number]          I reviewed the patient's new clinical results.    I reviewed the patient's new imaging results and agree with the interpretation.     ASSESSMENT/PLAN:   ASSESSMENT: 1.  Anemia with heme positive stool, likely due to GI blood loss.  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  Hemoglobin is stable.  2.  Pneumonia, on antibiotics  3.  History of Flexeril overdosage  4.  End-stage renal disease on hemodialysis    PLAN: 1.  Continue PPI and current supportive care  2.  Follow H&H closely and transfuse as needed  3.  Endoscopic evaluation if hemoglobin drops.  The risks, benefits, and alternatives of this procedure have been discussed with the patient or the responsible party. The patient understands and agrees to proceed.         Mingo Duarte MD  07/01/22  17:28 CDT

## 2022-07-01 NOTE — PROGRESS NOTES
[facility] Physician - Daily Progress Note  PERMANENT  07/01/2022 09:48    [facility] - CCU/SD - [facility] - [place] ([facility])    DALE FINK    Date of Service 07/01/2022 09:48    Current Problems & Interval Events [facility]  Reason for Admission to ICU: 63 yo F presented on 6-25 with AMS, suspected flexeril overdose.  Required intubation on arrival for airway protection. She has ESRD and is on dialysis.  VS: HR 68, /65, AF, sats 97% on FiO2 30%.  Neuro: Precedex only for sedation. She is more awake.  Still not following commands.  Wean  Precedex.  Pulmonary: Intubated for airway protection. Pulmoanry edema on CXR. Fio2 30%, PEEP 5.  Adequate oxygenation, no  hypercapnia. Using Precedex for SBT but still not following commands. Decrease Precedex and  repeat SBT after dialysis.  Cardiac: HD stable. QT has been prolonged, 520s. No Vtach.  Nephro: ESRD, dialysis dependent  ID-  febrile on 6-30 with elevated procal. CXR shows no definite opacities. Cultures-NGTD.  Abx  broadend.  GI: TFs   PPx: Heparin, Protonix    Physical Exam HR-67, BP-119/57, Temp-36.6(C), Resp Rate-19, O2 Sat%-97    Weight (kg): admission-135, current-125; I&O: intake-0, output-100, Urine-100, dialysis net (0), Total net (-100)    Skin:   Lungs:   Cardiovascular:   Abdomen:   Extremities:   Wound(s):   Neurologic:     Labs Electrolyte Labs:                              CBC Labs:  Na- 134  Cl- 94     BUN- 37                              HGB- 9.5     --------+-----------+----------+- GLU- 289     WBC- 12.12 -+-----------+- PLT- 347  K- 4.1   CO2- 24.0  Cr- 3.67                             HCT- 30.3    Lab Date/Time: 07/01/2022 05:58                Lab Date/Time: 07/01/2022 05:48 WBC and PLT = * 1,000    ABG: paO2- 76.7, pH- 7.423, paCO2- 43.2, HCO3- 28.2, O2Sat %- 96.4, Base Excess- 3.4, FiO2- 30, PEEP- 5.0. Lab Date/Time- 07/01/2022 04:07.    Ventilation Respiratory: FiO2 -  30, Mode - SIMV/VC+, PEEP - 5, Set TV - 400, TV/kg IBW - 7.9811. Date/Time -07/01/2022 07:13.        Electronically Signed by: Cintia Montez) on 07/01/2022 09:51

## 2022-07-01 NOTE — PAYOR COMM NOTE
"    Karmen Colorado RN [facility]  [phone]      Phone  [phone]       Fax  Cont. Stay REview      Yamileth Slater (62 y.o. Female)             Date of Birth   1960    Social Security Number       Address   [address] [place] [address]    Home Phone   [phone]    MRN   [id number]       Nondenominational   None    Marital Status                               Admission Date   6/25/22    Admission Type   Emergency    Admitting Provider   Charlene Castro MD    Attending Provider   Jerman Coffman MD    Department, Room/Bed   [facility] CRITICAL CARE STEPDOWN, 08/A       Discharge Date       Discharge Disposition       Discharge Destination                               Attending Provider: Jerman Coffman MD    Allergies: Adhesive Tape, Latex, Nsaids, Other    Isolation: Contact   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                 Active Insurance as of 6/25/2022     Primary Coverage     Payor Plan Insurance Group Employer/Plan Group    ANTHEM MEDICARE REPLACEMENT ANTHEM MEDICARE ADVANTAGE [id number]     Payor Plan Address Payor Plan Phone Number Payor Plan Fax Number Effective Dates    [address] [phone]  1/1/2022 - None Entered    [facility] [address]       Subscriber Name Subscriber Birth Date Member ID       YAMILETH SLATER 1960 [id number]           Secondary Coverage     Payor Plan Insurance Group Employer/Plan Group    [place]      Payor Plan Address Payor Plan Phone Number Payor Plan Fax Number Effective Dates    [address] [phone]  6/28/2019 - None Entered    [place] [address]       Subscriber Name Subscriber Birth Date Member ID       YAMILETH SLATER 1960 " [id number]                 Emergency Contacts      (Rel.) Home Phone Work Phone Mobile Phone    Huy Slater (Spouse) [phone] -- [phone]    Yamileth Chavez (Daughter) [phone] -- [phone]    Suzanne Rivera (Daughter) [phone] -- [phone]            Vital Signs (last day)     Date/Time Temp Temp src Pulse Resp BP Patient Position SpO2    07/01/22 0800 97.8 (36.6) Temporal 66 22 141/64 Lying 96    07/01/22 0713 -- -- 67 -- -- -- 96    07/01/22 0356 -- -- 66 26 -- -- 96    07/01/22 0200 -- -- 63 22 121/60 -- 95    07/01/22 0100 -- -- 64 22 132/63 -- 95    07/01/22 0059 -- -- 64 23 -- -- 95    07/01/22 0034 -- -- 65 -- 180/76 -- --    07/01/22 0000 98.7 (37.1) Temporal 66 22 160/70 -- 95    06/30/22 2215 -- -- 67 -- 181/76 -- 96    06/30/22 2200 -- -- 68 24 201/86 -- 96    06/30/22 2158 -- -- 68 24 -- -- 96    06/30/22 2100 -- -- 75 24 158/70 -- 94    06/30/22 2000 98.8 (37.1) Temporal 69 24 149/69 -- 98    06/30/22 1908 -- -- 72 31 -- -- 100    06/30/22 1839 -- -- 71 -- -- -- 97    06/30/22 1831 -- -- -- -- 141/63 -- --    06/30/22 1706 -- -- 69 -- -- -- 100    06/30/22 1701 -- -- -- -- 121/61 -- --    06/30/22 1630 -- -- -- -- 149/67 -- --    06/30/22 1615 -- -- 66 -- -- -- 96    06/30/22 1546 -- -- 67 -- -- -- 96    06/30/22 1530 -- -- -- -- 148/66 -- --    06/30/22 1500 -- -- 69 -- 154/68 -- 96    06/30/22 1359 99.7 (37.6) -- -- -- -- -- --    06/30/22 1331 -- -- -- -- 148/73 -- --    06/30/22 1319 -- -- 69 -- -- -- 93    06/30/22 1300 -- -- 72 -- -- -- 93    06/30/22 1241 -- -- -- -- 162/72 -- --    06/30/22 1152 -- -- 69 -- -- -- 96    06/30/22 1131 -- -- -- -- 156/70 -- --    06/30/22 1030 -- -- -- -- 161/67 -- --    06/30/22 1022 -- -- 70 -- -- -- 96    06/30/22 0954 -- -- 72 -- -- -- 96    06/30/22 0931 -- -- -- -- 139/64 -- --    06/30/22 0900 -- -- -- -- 152/66 -- --    06/30/22 0818 -- -- 72 -- -- -- 97    06/30/22 0800 100.6 (38.1) -- 72 -- 137/65 -- 97    06/30/22 0658 --  -- 72 -- -- -- 97    06/30/22 0600 -- -- 72 -- 132/62 -- 97    06/30/22 0500 -- -- 72 -- 132/62 -- 96    06/30/22 0426 -- -- 70 -- 182/77 -- --    06/30/22 0407 -- -- 71 -- -- -- 96    06/30/22 0400 98.8 (37.1) Temporal 71 -- 182/77 -- 96    06/30/22 0300 100 (37.8) Axillary 72 -- 179/76 -- 96    06/30/22 0200 -- -- 70 -- 173/75 -- 95    06/30/22 0100 -- -- 70 22 165/72 -- 96    06/30/22 0047 -- -- 70 21 -- -- 96    06/30/22 0000 -- -- 75 22 159/71 -- 95          Oxygen Therapy (last day)     Date/Time SpO2 Device (Oxygen Therapy) Flow (L/min) Oxygen Concentration (%) ETCO2 (mmHg)    07/01/22 0800 96 ventilator -- -- --    07/01/22 0713 96 ventilator -- 30 --    07/01/22 0356 96 ventilator -- 30 --    07/01/22 0200 95 -- -- -- --    07/01/22 0100 95 -- -- -- --    07/01/22 0059 95 ventilator -- 30 --    07/01/22 0000 95 -- -- -- --    06/30/22 2215 96 -- -- -- --    06/30/22 2200 96 -- -- -- --    06/30/22 2158 96 ventilator -- 30 --    06/30/22 2100 94 -- -- -- --    06/30/22 2000 98 -- -- -- --    06/30/22 1908 100 ventilator -- 30 --    06/30/22 1839 97 -- -- -- --    06/30/22 1706 100 -- -- -- --    06/30/22 1615 96 ventilator -- 30 --    06/30/22 1546 96 -- -- -- --    06/30/22 1500 96 -- -- -- --    06/30/22 1319 93 ventilator -- 30 --    06/30/22 1300 93 -- -- -- --    06/30/22 1152 96 -- -- -- --    06/30/22 1022 96 ventilator -- 30 --    06/30/22 0954 96 -- -- -- --    06/30/22 0818 97 -- -- -- --    06/30/22 0800 97 -- -- -- --    06/30/22 0658 97 ventilator -- 30 --    06/30/22 0600 97 -- -- -- --    06/30/22 0500 96 -- -- -- --    06/30/22 0407 96 ventilator -- 30 --    06/30/22 0400 96 -- -- -- --    06/30/22 0300 96 -- -- -- --    06/30/22 0200 95 -- -- -- --    06/30/22 0100 96 -- -- -- --    06/30/22 0047 96 ventilator -- 30 --    06/30/22 0000 95 -- -- -- --          Current Facility-Administered Medications   Medication Dose Route Frequency Provider Last Rate Last Admin   • albumin human 25 % IV  SOLN 12.5 g  12.5 g Intravenous PRN Ace Lauren MD       • sennosides-docusate (PERICOLACE) 8.6-50 MG per tablet 2 tablet  2 tablet Oral BID Charlene Castro MD   2 tablet at 06/30/22 2052    And   • polyethylene glycol (MIRALAX) packet 17 g  17 g Oral Daily PRN Charlene Castro MD   17 g at 06/29/22 2039    And   • bisacodyl (DULCOLAX) EC tablet 5 mg  5 mg Oral Daily PRN Charlene Castro MD   5 mg at 06/29/22 1715    And   • bisacodyl (DULCOLAX) suppository 10 mg  10 mg Rectal Daily PRN Charlene Castro MD       • bumetanide (BUMEX) injection 2 mg  2 mg Intravenous Daily Ace Lauren MD   2 mg at 07/01/22 0807   • chlorhexidine (PERIDEX) 0.12 % solution 15 mL  15 mL Mouth/Throat Q12H Charlene Castro MD   15 mL at 07/01/22 0808   • dexmedetomidine (PRECEDEX) 400 mcg in 100 mL NS infusion  0.2-1.5 mcg/kg/hr Intravenous Titrated Cintia Montez MD 21.9 mL/hr at 07/01/22 0635 0.7 mcg/kg/hr at 07/01/22 0635   • dextrose (D50W) (25 g/50 mL) IV injection 10-50 mL  10-50 mL Intravenous Q15 Min PRN Charlene Castro MD       • dextrose (D50W) (25 g/50 mL) IV injection 25 g  25 g Intravenous Q15 Min PRN Jung Leonard MD       • dextrose (GLUTOSE) oral gel 15 g  15 g Oral Q15 Min PRN Jung Leonard MD       • epoetin hubert (EPOGEN,PROCRIT) injection 10,000 Units  10,000 Units Intravenous Once per day on Mon Wed Fri Ace Lauren MD   10,000 Units at 06/29/22 0924   • gabapentin (NEURONTIN) 50 mg/mL solution 300 mg  300 mg Oral Daily Jerman Coffman MD       • glucagon (human recombinant) (GLUCAGEN DIAGNOSTIC) injection 1 mg  1 mg Intramuscular Q15 Min PRN Jung Leonard MD       • heparin (porcine) 5000 UNIT/ML injection 5,000 Units  5,000 Units Subcutaneous Q12H Jung Leonard MD   5,000 Units at 06/30/22 2006   • heparin (porcine) injection 4,000 Units  4,000 Units Intracatheter PRN Ace Lauren MD   4,000 Units at 06/29/22 1114   • heparin (porcine) injection 4,000 Units  4,000 Units  Intracatheter PRN Ace Lauren MD       • hydrALAZINE (APRESOLINE) injection 10 mg  10 mg Intravenous Q6H PRN Jung Lenoard MD   10 mg at 07/01/22 0034   • Insulin Aspart (novoLOG) injection 0-7 Units  0-7 Units Subcutaneous TID AC Jung Leonard MD   5 Units at 07/01/22 0828   • Insulin Aspart (novoLOG) injection 8 Units  8 Units Subcutaneous TID With Meals Jerman Coffman MD   8 Units at 07/01/22 0830   • insulin detemir (LEVEMIR) injection 15 Units  15 Units Subcutaneous Q12H Jung Leonard MD   15 Units at 07/01/22 0829   • isosorbide mononitrate (IMDUR) 24 hr tablet 30 mg  30 mg Oral Q24H Jung Leonard MD       • Linezolid (ZYVOX) 600 mg 300 mL  600 mg Intravenous Q12H Jung Leonard  mL/hr at 06/30/22 2215 600 mg at 06/30/22 2215   • [START ON 7/2/2022] lisinopril (PRINIVIL,ZESTRIL) tablet 20 mg  20 mg Oral Once per day on Sun Tue Thu Sat Jung Leonard MD       • magnesium sulfate 4 gram infusion - Mg less than or equal to 1mg/dL  4 g Intravenous PRN Charlene Castro MD        Or   • magnesium sulfate 3 gram infusion (1gm x 3) - Mg 1.1 - 1.5 mg/dL  1 g Intravenous PRN Charlene Castro MD        Or   • Magnesium Sulfate 2 gram infusion- Mg 1.6 - 1.9 mg/dL  2 g Intravenous PRN Charlene Castro MD 25 mL/hr at 06/27/22 2104 2 g at 06/27/22 2104   • meropenem (MERREM) 1 g/100 mL 0.9% NS (mbp)  1 g Intravenous Q12H Jung Leonard MD   1 g at 07/01/22 0453   • metoprolol tartrate (LOPRESSOR) injection 5 mg  5 mg Intravenous Q5 Min PRN Froylan Lu MD   5 mg at 06/26/22 1921   • metoprolol tartrate (LOPRESSOR) tablet 25 mg  25 mg Oral Q12H Froylan Lu MD   25 mg at 06/30/22 2008   • morphine injection 1 mg  1 mg Intravenous Q4H PRN Charlene Castro MD   1 mg at 06/30/22 2006    And   • naloxone (NARCAN) injection 0.4 mg  0.4 mg Intravenous Q5 Min PRN Charlene Castro MD       • norepinephrine (LEVOPHED) 8 mg in 250 mL NS infusion (premix)  0.02-0.3 mcg/kg/min Intravenous Titrated Gloria  "MD Charlene   Held at 22 0300   • pantoprazole (PROTONIX) injection 40 mg  40 mg Intravenous Q24H Charlene Castro MD   40 mg at 22 0807   • Pharmacy to Dose meropenem (MERREM)   Does not apply Continuous PRN Jung Leonard MD       • potassium chloride 10 mEq in 100 mL IVPB  10 mEq Intravenous Continuous PRN Charlene Castro MD   Stopped at 22 0130   • potassium chloride 10 mEq in 100 mL IVPB  10 mEq Intravenous Continuous PRN Charlene Castro MD   Stopped at 22 0212   • sodium chloride 0.45 % with KCl 20 mEq/L infusion  250 mL/hr Intravenous Continuous PRN Charlene Castro MD       • sodium chloride 0.9 % flush 10 mL  10 mL Intravenous Q12H Jerman Coffman MD       • sodium chloride 0.9 % flush 10 mL  10 mL Intravenous Q12H Jerman Coffman MD       • sodium chloride 0.9 % flush 10 mL  10 mL Intravenous Q12H Jerman Coffman MD   10 mL at 22 0807   • sodium chloride 0.9 % flush 10 mL  10 mL Intravenous PRN Jerman Coffman MD       • sodium chloride 0.9 % flush 20 mL  20 mL Intravenous PRN Jerman Coffman MD            Physician Progress Notes (last 48 hours)      Ace Lauren MD at 22 1113          [facility]  [address] [place] [address]. [address]  T - [phone]  F - [phone]     Progress Note          PATIENT  DEMOGRAPHICS   PATIENT NAME: Yamileth Slater                      PHYSICIAN: Ace Lauren MD  : 1960  MRN: [id number]   LOS: 5 days    Patient Care Team:  Rianna Macias MD as PCP - General (Family Medicine)  Subjective   SUBJECTIVE   remained intubated, on hd. bp gets high on non dialysis days       Objective   OBJECTIVE   Vital Signs  Temp:  [97.7 °F (36.5 °C)-101 °F (38.3 °C)] 100.6 °F (38.1 °C)  Heart Rate:  [67-86] 70  Resp:  [21-30] 22  BP: ()/(51-77) 139/64  FiO2 (%):  [30 %] 30 %    Flowsheet Rows    Flowsheet Row First Filed Value   Admission Height 157.5 cm (62\") Documented at 2022 1743 "   Admission Weight 135 kg (297 lb) Documented at 06/25/2022 1743           I/O last 3 completed shifts:  In: 546.4 [I.V.:346.4; Other:50; NG/GT:50; IV Piggyback:100]  Out: 4319 [Urine:819; Emesis/NG output:500; Other:3000]    PHYSICAL EXAM    Physical Exam  Constitutional:       Appearance: She is well-developed. She is ill-appearing.   HENT:      Head: Normocephalic.   Eyes:      Pupils: Pupils are equal, round, and reactive to light.   Cardiovascular:      Rate and Rhythm: Normal rate and regular rhythm.      Heart sounds: Normal heart sounds.   Pulmonary:      Effort: Pulmonary effort is normal.      Breath sounds: Normal breath sounds.   Abdominal:      General: Bowel sounds are normal.      Palpations: Abdomen is soft.   Musculoskeletal:         General: No swelling.   Skin:     Coloration: Skin is not jaundiced.   Neurological:      Deep Tendon Reflexes: Reflexes normal.         RESULTS   Results Review:    Results from last 7 days   Lab Units 06/30/22  0601 06/29/22  0635 06/28/22  0554   SODIUM mmol/L 132* 136 133*   POTASSIUM mmol/L 4.3 3.5 3.9   CHLORIDE mmol/L 93* 95* 95*   CO2 mmol/L 19.0* 27.0 27.0   BUN mg/dL 24* 33* 24*   CREATININE mg/dL 2.92* 2.98* 2.29*   CALCIUM mg/dL 9.2 9.0 9.1   BILIRUBIN mg/dL 0.4 0.4 0.5   ALK PHOS U/L 148* 137* 150*   ALT (SGPT) U/L <5 <5 <5   AST (SGOT) U/L 14 7 6   GLUCOSE mg/dL 260* 226* 249*       Estimated Creatinine Clearance: 24.9 mL/min (A) (by C-G formula based on SCr of 2.92 mg/dL (H)).    Results from last 7 days   Lab Units 06/28/22  0554 06/27/22 2010 06/27/22  0837 06/27/22  0408 06/26/22 2027   MAGNESIUM mg/dL 2.2 1.8 2.1 2.0 2.2   PHOSPHORUS mg/dL  --   --  4.3 4.1 3.8             Results from last 7 days   Lab Units 06/30/22  0716 06/29/22  0516 06/28/22  0554 06/27/22  0536 06/26/22  1237 06/26/22  0457   WBC 10*3/mm3 11.78* 10.44 11.59* 10.77  --  7.34   HEMOGLOBIN g/dL 10.0* 8.6* 8.7* 8.1* 8.7* 7.0*   PLATELETS 10*3/mm3 265 303 245 254  --  247        Results from last 7 days   Lab Units 06/25/22  1832   INR  1.11         Imaging Results (Last 24 Hours)     Procedure Component Value Units Date/Time    XR Chest 1 View [[id number]] Collected: 06/30/22 0519     Updated: 06/30/22 0810    Narrative:        PROCEDURE: Single chest view portable    REASON FOR EXAM:intubated, R41.82 Altered mental status,  unspecified J96.00 Acute respiratory failure, unspecified whether  with hypoxia or hypercapnia J81.0 Acute pulmonary edema N18.6 End  stage renal disease R73.9 Hyperglycemia, unspecified    FINDINGS: Comparison exam dated June 28, 2022. Postsurgical  changes with median sternotomy. ET tube with tip 3.29 cm above  valentina. NG tube with tip below level of diaphragm. Left jugular  central venous catheter with tip in the region of the SVC. Right  jugular double lumen large bore central venous catheter with tip  in the SVC. Cardiomegaly. Vague left upper lung field perihilar  interstitial groundglass opacity. Lungs are otherwise clear..  Pleural spaces are normal. No acute osseous abnormality.      Impression:      1.  Tubes and lines as described above.  2.  Cardiomegaly.  3.  Vague left upper lung field perihilar interstitial  groundglass opacity suspicious for very mild atypical pulmonary  edema versus early pneumonia.    Electronically signed by:  Young Johnson MD  6/30/2022 8:08 AM CDT  Workstation: [id number]    XR Tibia Fibula 2 View Left [[id number]] Collected: 06/29/22 0930     Updated: 06/29/22 1942    Narrative:      EXAM:  XR TIBIA FIBULA 2 VIEWS    ORDERING PROVIDER:  DAVID ALMENDAREZ    CLINICAL HISTORY:  Osteomyelitis assessment    COMPARISON STUDY:      TECHNIQUE:  Two views of the left lower leg    FINDINGS:  There is no acute displaced fracture.  The alignment is anatomic.   No soft tissue abnormality, or radiopaque foreign body is seen.   There is no joint effusion.  No definite osteolysis  Scattered vascular clips in the leg      Impression:      No acute  displaced fracture, or traumatic subluxation based on  current assessment.  No definite osteolysis to indicate radiographic evidence of  osteomyelitis.  If there is concern for subtle involvement due to osteomyelitis,  correlation with MRI or triple phase bone scan is recommended due  to suboptimal sensitivity of radiographic assessment.     Electronically signed by:  Richmond Bernal MD  6/29/2022 7:40 PM CDT  Workstation: [id number]    XR Abdomen KUB [[id number]] Collected: 06/29/22 0935     Updated: 06/29/22 1600    Narrative:      RADIOGRAPH ABDOMEN KUB    INDICATION: assess stool burden, R41.82 Altered mental status,  unspecified J96.00 Acute respiratory failure, unspecified whether  with hypoxia or hypercapnia J81.0 Acute pulmonary edema N18.6 End  stage renal disease R73.9 Hyperglycemia, unspecified    COMPARISON: KUB 10/15/2021    TECHNIQUE: Single frontal radiograph was obtained of the  mid/lower abdomen and pelvis.    FINDINGS:  Due to the patient's size, only the mid and left abdomen were  imaged. The right colon is not visualized. There is a nasogastric  feeding tube terminating in the stomach. Bowel gas pattern is  unremarkable. Penetration to x-rays limited due to the patient's  size. There is no significant amount of stool visualized in the  transverse, descending, sigmoid colon or rectum. There is severe  aortoiliac atherosclerosis. There is osseous degenerative change.      Impression:      1. Ascending colon not imaged due to patient size. Given these  limitations, no significant stool visualized in transverse,  descending, sigmoid colon or rectum.  2. Severe aortoiliac atherosclerosis.    Electronically signed by:  Yoon Malloy MD  6/29/2022 3:57 PM CDT  Workstation: [id number]           MEDICATIONS    bumetanide, 2 mg, Intravenous, Q12H  chlorhexidine, 15 mL, Mouth/Throat, Q12H  epoetin hubert/hubert-epbx, 10,000 Units, Intravenous, Once per day on Mon Wed Fri  gabapentin, 300 mg, Oral, Daily  heparin  (porcine), 5,000 Units, Subcutaneous, Q12H  Insulin Aspart, 0-7 Units, Subcutaneous, TID AC  insulin detemir, 15 Units, Subcutaneous, Q12H  isosorbide mononitrate, 30 mg, Oral, Q24H  Linezolid, 600 mg, Intravenous, Q12H  lisinopril, 20 mg, Oral, Q24H  meropenem, 1 g, Intravenous, Once  meropenem, 1 g, Intravenous, Q12H  metoprolol tartrate, 25 mg, Oral, Q12H  pantoprazole, 40 mg, Intravenous, Q24H  senna-docusate sodium, 2 tablet, Oral, BID  sodium chloride, 10 mL, Intravenous, Q12H  sodium chloride, 10 mL, Intravenous, Q12H      dexmedetomidine, 0.2-1.5 mcg/kg/hr, Last Rate: 0.7 mcg/kg/hr (06/30/22 0736)  dextrose 5 % and sodium chloride 0.45 % with KCl 20 mEq/L, 150 mL/hr  dextrose 5 % and sodium chloride 0.45 % with KCl 40 mEq/L, 150 mL/hr, Last Rate: Stopped (06/26/22 0942)  dextrose 5 % and sodium chloride 0.9 %, 150 mL/hr  dextrose 5 % and sodium chloride 0.9 % with KCl 20 mEq, 150 mL/hr  dextrose 5% and sodium chloride 0.9% with KCl 40 mEq/L, 150 mL/hr  norepinephrine, 0.02-0.3 mcg/kg/min, Last Rate: Stopped (06/26/22 0300)  Pharmacy to Dose Cefepime,   Pharmacy to Dose meropenem (MERREM),   sodium chloride, 200 mL/hr, Last Rate: Stopped (06/26/22 0130)   And  potassium chloride, 10 mEq, Last Rate: Stopped (06/26/22 0130)  sodium chloride, 150 mL/hr, Last Rate: Stopped (06/26/22 0211)   And  potassium chloride, 10 mEq, Last Rate: Stopped (06/26/22 0212)  sodium chloride, 250 mL/hr  sodium chloride 0.45 % with KCl 20 mEq, 250 mL/hr  sodium chloride, 125 mL/hr, Last Rate: Stopped (06/25/22 2010)  sodium chloride, 250 mL/hr  sodium chloride 0.9 % with KCl 20 mEq, 250 mL/hr  sodium chloride 0.9 % with KCl 40 mEq/L, 250 mL/hr        Assessment & Plan   ASSESSMENT / PLAN      Altered mental status    Hypertension    COPD (chronic obstructive pulmonary disease) (HCC)    Coronary artery disease involving native coronary artery of native heart without angina pectoris    Hypothyroidism    Type 2 diabetes mellitus with  autonomic neuropathy (HCC)    ESRD on hemodialysis (HCC)    Accidental drug overdose    On mechanically assisted ventilation (HCC)    Pleural effusion on right    Acute respiratory failure with hypoxia and hypercapnia (HCC)    Anemia    Hypomagnesemia    Ventilator associated pneumonia (HCC)    1.ESRD dependent on hemodialysis since October 2021.  Has tunnel catheter. Patient is on dialysis MWF at [facility].  Electrolytes are stable.  hd tomorrow. Lost weight from 297 to 275 lbs and I will lower bumex to daily. Appears euvolemic now     2. Anemia chronic kidney disease.  Patient had extensive work-up in the past by Dr. Munoz. She also has B12 deficiency.  She has history of AVM.  Patient is on Epogen 13K with HD.  She received 2 units of packed RBCs. hgb stable and improved     3. htn - runs low with hd. Lisinopril on non dialysis day    4. Possible PNA/Chronic Resp Failure related to COPD- Intubated and on the ventilator. She is MRSA screen positive.Patient is on home oxygen and trilogy when sleeping.  On meropenem and zyvox     5. Possible OD on flexeril- rec'd activated charcoal per g tube     6. DM- Glucose was 605 on arrival- management per primary team.      7.coronary artery disease     8.chronic kidney disease/mineral and bone disorder on sevelamer                 This document has been electronically signed by Ace Lauren MD on June 30, 2022 11:13 CDT           Electronically signed by Ace Lauren MD at 06/30/22 1116     Progress Notes signed by Cintia Montez MD at 06/30/22 0911         [facility] Physician - Daily Progress Note  PERMANENT  06/30/2022 10:07    [employer] [facility] - U/SD - [address] - [place] ([facility])    DALE FINK    Date of Service 06/30/2022 10:07    Current Problems & Interval Events [facility]  Reason for Admission to ICU: 61 yo F presented on 6-25 with AMS, suspected flexeril overdose.  Required intubation on  arrival for airway protection. She has ESRD and is on dialysis.  VS: HR 72, /65, 100.6F, sats 98% on Fio2 30%.  Neuro: Precedex only for sedation.  She remains encephalopathic.  Moves all extremities.  Wean Precedex.  Pulmonary: Intubated for airway protection.  Pulmoanry edema on CXR. Fio2 30%, PEEP 5. Adequate oxygenation, no  hypercapnia. Using Precedex for SBT but still not following commands.  Decrease Precedex and repeat SBT today.  Cardiac: HD stable. QT has been prolonged, 520s.  No Vtach.  Nephro: ESRD, dialysis dependent  ID- now febrile with elevated procal.  CXR shows no definite opacities.  cultures ordered.  Abx broadend.  GI: TFs ordered.  PPx: Heparin, Protonix    Physical Exam HR-71, BP-138/65, Temp-38.1(C), Resp Rate-29, O2 Sat%-96    Weight (kg): admission-135, current-125; I&O: intake-0, output-300, Urine-300, dialysis net (-3000), Total net (-3300)    Skin:   Lungs:   Cardiovascular:   Abdomen:   Extremities:   Wound(s):   Neurologic:     Labs CBC Labs:               HGB- 10.0    WBC- 11.78 -+-----------+- PLT- 265               HCT- 31.8    Lab Date/Time: 06/30/2022 07:16 WBC and PLT = * 1,000    ABG: paO2- 80.0, pH- 7.421, paCO2- 39.2, HCO3- 25.5, O2Sat %- 97.2, Base Excess- 1.0, FiO2- 30, PEEP- 5.0. Lab Date/Time- 06/30/2022 04:16.    Ventilation Respiratory: FiO2 - 30, Mode - SIMV/VC+, PEEP - 5, Set TV - 400, TV/kg IBW - 7.9811. Date/Time -06/30/2022 06:58.        Electronically Signed by: Cintia Montez) on 06/30/2022 10:11    Electronically signed by Cintia Montez MD at 06/30/22 0911     Jung Leonard MD at 06/30/22 0647     Attestation signed by Kit Brar MD at 06/30/22 1630    I have seen and evaluated the patient.  I have discussed the case with the resident. I have reviewed the notes, assessment and plan, and/or procedures performed by the resident. I concur with the resident’s documentation.       Patient is intubated and on mechanical ventilation.  She is able to open her eyes to voice, otherwise unable to properly follow commands. She had spiked a temp of 100.6.     Physical Exam:  General: NAD.  CV: S1 and S2 normal. RRR.  Pulmonary: Diminished breath sounds present bilaterally.   Abdomen: Bowel sounds present and normal.  Abdomen is soft, and nontender.  Extremities: No lower extremity edema.      Plan: Escalate antibiotic coverage to Zyvox and Meropenem. Follow new cultures. Nephrology following for ESRD management. Hicuity following for vent management.       This document has been electronically signed by Kit Brar MD on 2022 16:30 CDT                        CRITICAL CARE DAILY PROGRESS NOTE  Family Medicine Residency Service  NAME: Yamileth Slater  : 1960  MRN: [id number]      LOS: 5 days     PROVIDER OF SERVICE: Jung Leonard MD    Chief Complaint: Altered mental status    Subjective:     Interval History: History provided by: chart RN  Patient Complaints: intubated and minimally sedated     No overnight events. Patient continues to open her eyes spontaneously however does not follow commands. Vitals have remained stable. Tolerated HD yesterday. Patient spiked fever overnight, ordered repeat blood cultures and urine cultures. Will attempt to decrease sedation and continue to see if patient is able to follow commands. Wound culture showed no growth, although pt had been on antibiotics when it was obtained.  Changed antibiotics today.     Feeding: Tube feeds; IV push only, no IV fluids  Analgesia: Morphine IV  and Fentanyl IV  Sedation: Precedex   Thromboprophylaxis: Heparin  HOB: 30 degrees  Ulcer ppx: PPIs  Glucose control: SSI  SBT: failed. Does not follow commands   Bowel Regimen:Miralax and Enema  Indwelling catheters: Central line, Day# 5 and IV, Day# 5; Forman catheter is in place.  De-escalation of antibiotics: discontinued Vancomycin     Review of Systems:   Review of Systems   Unable to perform ROS: Intubated        Objective:     Vital Signs  Temp:  [97.7 °F (36.5 °C)-101 °F (38.3 °C)] 100.6 °F (38.1 °C)  Heart Rate:  [67-86] 72  Resp:  [21-30] 22  BP: ()/(43-77) 139/64  FiO2 (%):  [30 %] 30 %  Body mass index is 50.4 kg/m².    FiO2 (%):  [30 %] 30 %  S RR:  [12] 12  PEEP/CPAP (cm H2O):  [5 cm H20] 5 cm H20  [place] SUP:  [14 cm H20] 14 cm H20  MAP (cm H2O):  [6.2-11] 11    I/O last 3 completed shifts:  In: 546.4 [I.V.:346.4; Other:50; NG/GT:50; IV Piggyback:100]  Out: 4319 [Urine:819; Emesis/NG output:500; Other:3000]    Physical Exam  Physical Exam  Vitals and nursing note reviewed.   Constitutional:       General: She is not in acute distress.     Appearance: She is obese. She is ill-appearing. She is not toxic-appearing or diaphoretic.   HENT:      Nose: Nose normal.      Mouth/Throat:      Mouth: Mucous membranes are moist.      Comments: Intubation tube in place  Foamy secretion for short period of time   Eyes:      General:         Right eye: Discharge present.         Left eye: Discharge present.     Conjunctiva/sclera: Conjunctivae normal.      Pupils: Pupils are equal, round, and reactive to light.   Cardiovascular:      Rate and Rhythm: Normal rate and regular rhythm.      Pulses: Normal pulses.      Heart sounds: Normal heart sounds. No murmur heard.  Pulmonary:      Breath sounds: No wheezing.   Abdominal:      General: Bowel sounds are normal.      Palpations: Abdomen is soft.      Tenderness: There is no abdominal tenderness. There is no guarding.   Musculoskeletal:      Right lower leg: No edema.      Left lower leg: No edema.      Comments: SCDs in place. Packing present on the wound on left lower extrmity     Skin:     General: Skin is warm and dry.      Capillary Refill: Capillary refill takes less than 2 seconds.   Neurological:      Comments: Intubated and minimally sedated  Minimal response to painful stimuli, does not localize the stimuli.  Spontaneously opens eyes to sounds however does not  follow commands          Medication Review    Current Facility-Administered Medications:   •  sennosides-docusate (PERICOLACE) 8.6-50 MG per tablet 2 tablet, 2 tablet, Oral, BID, 2 tablet at 06/30/22 0901 **AND** polyethylene glycol (MIRALAX) packet 17 g, 17 g, Oral, Daily PRN, 17 g at 06/29/22 2039 **AND** bisacodyl (DULCOLAX) EC tablet 5 mg, 5 mg, Oral, Daily PRN, 5 mg at 06/29/22 1715 **AND** bisacodyl (DULCOLAX) suppository 10 mg, 10 mg, Rectal, Daily PRN, Charlene Castro MD  •  bumetanide (BUMEX) injection 2 mg, 2 mg, Intravenous, Q12H, Janice Chavez, APRN, 2 mg at 06/30/22 0006  •  chlorhexidine (PERIDEX) 0.12 % solution 15 mL, 15 mL, Mouth/Throat, Q12H, Charlene Castro MD, 15 mL at 06/30/22 0900  •  dexmedetomidine (PRECEDEX) 400 mcg in 100 mL NS infusion, 0.2-1.5 mcg/kg/hr, Intravenous, Titrated, Cintia Montez MD, Last Rate: 21.9 mL/hr at 06/30/22 0736, 0.7 mcg/kg/hr at 06/30/22 0736  •  dextrose (D50W) (25 g/50 mL) IV injection 10-50 mL, 10-50 mL, Intravenous, Q15 Min PRN, Charlene Castro MD  •  dextrose (D50W) (25 g/50 mL) IV injection 25 g, 25 g, Intravenous, Q15 Min PRN, Jung Leonard MD  •  dextrose (GLUTOSE) oral gel 15 g, 15 g, Oral, Q15 Min PRN, Jung Leonard MD  •  dextrose 5 % and sodium chloride 0.45 % with KCl 20 mEq/L infusion, 150 mL/hr, Intravenous, Continuous PRN, Charlene Castro MD  •  dextrose 5 % and sodium chloride 0.45 % with KCl 40 mEq/L infusion, 150 mL/hr, Intravenous, Continuous PRN, Charlene Castro MD, Stopped at 06/26/22 0942  •  dextrose 5 % and sodium chloride 0.9 % infusion, 150 mL/hr, Intravenous, Continuous PRN, Charlene Castro MD  •  dextrose 5 % and sodium chloride 0.9 % with KCl 20 mEq/L infusion, 150 mL/hr, Intravenous, Continuous PRN, Charlene Castro MD  •  dextrose 5 % and sodium chloride 0.9 % with KCl 40 mEq/L infusion, 150 mL/hr, Intravenous, Continuous PRN, Charlene Castro MD  •  epoetin hubert (EPOGEN,PROCRIT) injection 10,000  Units, 10,000 Units, Intravenous, Once per day on Mon Wed Fri, Ace Lauren MD, 10,000 Units at 06/29/22 0924  •  gabapentin (NEURONTIN) capsule 300 mg, 300 mg, Oral, Daily, Jung Leonard MD, 300 mg at 06/30/22 0900  •  glucagon (human recombinant) (GLUCAGEN DIAGNOSTIC) injection 1 mg, 1 mg, Intramuscular, Q15 Min PRN, Jung Leonard MD  •  heparin (porcine) 5000 UNIT/ML injection 5,000 Units, 5,000 Units, Subcutaneous, Q12H, Jung Leonard MD, 5,000 Units at 06/30/22 0900  •  heparin (porcine) injection 4,000 Units, 4,000 Units, Intracatheter, PRN, Ace Lauren MD, 4,000 Units at 06/29/22 1114  •  hydrALAZINE (APRESOLINE) injection 10 mg, 10 mg, Intravenous, Q6H PRN, Jung Leonard MD, 10 mg at 06/30/22 0426  •  Insulin Aspart (novoLOG) injection 0-7 Units, 0-7 Units, Subcutaneous, TID AC, Jung Leonard MD, 4 Units at 06/30/22 0908  •  insulin detemir (LEVEMIR) injection 15 Units, 15 Units, Subcutaneous, Q12H, Jung Leonard MD  •  isosorbide mononitrate (IMDUR) 24 hr tablet 30 mg, 30 mg, Oral, Q24H, Jung Leonard MD  •  Linezolid (ZYVOX) 600 mg 300 mL, 600 mg, Intravenous, Q12H, Jung Leonard MD  •  lisinopril (PRINIVIL,ZESTRIL) tablet 20 mg, 20 mg, Oral, Q24H, Jung Leonard MD, 20 mg at 06/30/22 0901  •  magnesium sulfate 4 gram infusion - Mg less than or equal to 1mg/dL, 4 g, Intravenous, PRN **OR** magnesium sulfate 3 gram infusion (1gm x 3) - Mg 1.1 - 1.5 mg/dL, 1 g, Intravenous, PRN **OR** Magnesium Sulfate 2 gram infusion- Mg 1.6 - 1.9 mg/dL, 2 g, Intravenous, PRN, Charlene Castro MD, Last Rate: 25 mL/hr at 06/27/22 2104, 2 g at 06/27/22 2104  •  metoprolol tartrate (LOPRESSOR) injection 5 mg, 5 mg, Intravenous, Q5 Min PRN, Froylan Lu MD, 5 mg at 06/26/22 1921  •  metoprolol tartrate (LOPRESSOR) tablet 25 mg, 25 mg, Oral, Q12H, Froylan Lu MD, 25 mg at 06/30/22 0901  •  morphine injection 1 mg, 1 mg, Intravenous, Q4H PRN, 1 mg at 06/29/22 0337 **AND** naloxone (NARCAN) injection 0.4 mg, 0.4  mg, Intravenous, Q5 Min PRN, Charlene Castro MD  •  norepinephrine (LEVOPHED) 8 mg in 250 mL NS infusion (premix), 0.02-0.3 mcg/kg/min, Intravenous, Titrated, Charlene Castro MD, Held at 06/26/22 0300  •  pantoprazole (PROTONIX) injection 40 mg, 40 mg, Intravenous, Q24H, Charlene Castro MD, 40 mg at 06/30/22 0901  •  Pharmacy to Dose Cefepime, , Does not apply, Continuous PRN, Jung Leonard MD  •  Pharmacy to Dose meropenem (MERREM), , Does not apply, Continuous PRN, Jung Leonard MD  •  sodium chloride 0.9 % infusion, 200 mL/hr, Intravenous, Continuous PRN, Stopped at 06/26/22 0130 **AND** potassium chloride 10 mEq in 100 mL IVPB, 10 mEq, Intravenous, Continuous PRN, Charlene Castro MD, Stopped at 06/26/22 0130  •  sodium chloride 0.45 % infusion, 150 mL/hr, Intravenous, Continuous PRN, Stopped at 06/26/22 0211 **AND** potassium chloride 10 mEq in 100 mL IVPB, 10 mEq, Intravenous, Continuous PRN, Charlene Castro MD, Stopped at 06/26/22 0212  •  sodium chloride 0.45 % infusion, 250 mL/hr, Intravenous, Continuous PRN, Charlene Castro MD  •  sodium chloride 0.45 % with KCl 20 mEq/L infusion, 250 mL/hr, Intravenous, Continuous PRN, Charlene Castro MD  •  sodium chloride 0.9 % bolus 100 mL, 100 mL, Intravenous, PRN, Ace Lauren MD  •  [COMPLETED] Insert peripheral IV, , , Once **AND** sodium chloride 0.9 % flush 10 mL, 10 mL, Intravenous, PRN, Charlene Castro MD  •  sodium chloride 0.9 % flush 10 mL, 10 mL, Intravenous, Q12H, Charlene Castro MD, 10 mL at 06/30/22 0908  •  sodium chloride 0.9 % flush 10 mL, 10 mL, Intravenous, PRN, Charlene Castro MD  •  sodium chloride 0.9 % flush 10 mL, 10 mL, Intravenous, Q12H, Charlene Castro MD, 10 mL at 06/30/22 0902  •  sodium chloride 0.9 % flush 10 mL, 10 mL, Intravenous, PRN, Charlene Castro MD  •  sodium chloride 0.9 % infusion, 125 mL/hr, Intravenous, Continuous, Charlene Castro MD, Stopped at 06/25/22 2010  •  sodium  chloride 0.9 % infusion, 250 mL/hr, Intravenous, Continuous PRN, Charlene Castro MD  •  sodium chloride 0.9 % with KCl 20 mEq/L infusion, 250 mL/hr, Intravenous, Continuous PRN, Charlene Castro MD  •  sodium chloride 0.9 % with KCl 40 mEq/L infusion, 250 mL/hr, Intravenous, Continuous PRN, Charlene Castro MD     Diagnostic Data    Lab Results (last 24 hours)     Procedure Component Value Units Date/Time    Wound Culture - Wound, Leg, Left [[id number]] Collected: 06/29/22 0632    Specimen: Wound from Leg, Left Updated: 06/30/22 0950     Wound Culture No growth     Gram Stain Few (2+) WBCs seen      No organisms seen    Lactic Acid, Plasma [[id number]]  (Normal) Collected: 06/30/22 0813    Specimen: Blood Updated: 06/30/22 0857     Lactate 1.1 mmol/L     Blood Culture - Blood, Arm, Right [[id number]] Collected: 06/30/22 0746    Specimen: Blood from Arm, Right Updated: 06/30/22 0748    Blood Culture - Blood, Blood, Central Line [[id number]] Collected: 06/30/22 0746    Specimen: Blood, Central Line Updated: 06/30/22 0748    CBC Auto Differential [[id number]]  (Abnormal) Collected: 06/30/22 0716    Specimen: Blood, Central Line Updated: 06/30/22 0742     WBC 11.78 10*3/mm3      RBC 3.72 10*6/mm3      Hemoglobin 10.0 g/dL      Hematocrit 31.8 %      MCV 85.5 fL      MCH 26.9 pg      MCHC 31.4 g/dL      RDW 16.8 %      RDW-SD 51.8 fl      MPV 8.5 fL      Platelets 265 10*3/mm3      Neutrophil % 76.8 %      Lymphocyte % 12.1 %      Monocyte % 7.2 %      Eosinophil % 2.3 %      Basophil % 0.6 %      Immature Grans % 1.0 %      Neutrophils, Absolute 9.04 10*3/mm3      Lymphocytes, Absolute 1.43 10*3/mm3      Monocytes, Absolute 0.85 10*3/mm3      Eosinophils, Absolute 0.27 10*3/mm3      Basophils, Absolute 0.07 10*3/mm3      Immature Grans, Absolute 0.12 10*3/mm3      nRBC 0.0 /100 WBC     Comprehensive Metabolic Panel [[id number]]  (Abnormal) Collected: 06/30/22 0601    Specimen: Blood Updated: 06/30/22 0652      "Glucose 260 mg/dL      BUN 24 mg/dL      Creatinine 2.92 mg/dL      Sodium 132 mmol/L      Potassium 4.3 mmol/L      Comment: Slight hemolysis detected by analyzer. Results may be affected.        Chloride 93 mmol/L      CO2 19.0 mmol/L      Calcium 9.2 mg/dL      Total Protein 7.5 g/dL      Albumin 2.60 g/dL      ALT (SGPT) <5 U/L      AST (SGOT) 14 U/L      Comment: Slight hemolysis detected by analyzer. Results may be affected.        Alkaline Phosphatase 148 U/L      Total Bilirubin 0.4 mg/dL      Globulin 4.9 gm/dL      A/G Ratio 0.5 g/dL      BUN/Creatinine Ratio 8.2     Anion Gap 20.0 mmol/L      eGFR 17.5 mL/min/1.73      Comment: National Kidney Foundation and American Society of Nephrology (ASN) Task Force recommended calculation based on the Chronic Kidney Disease Epidemiology Collaboration (CKD-EPI) equation refit without adjustment for race.       Narrative:      GFR Normal >60  Chronic Kidney Disease <60  Kidney Failure <15      C-reactive Protein [[id number]]  (Abnormal) Collected: 06/30/22 0601    Specimen: Blood Updated: 06/30/22 0648     C-Reactive Protein 28.11 mg/dL     Procalcitonin [[id number]]  (Abnormal) Collected: 06/30/22 0601    Specimen: Blood Updated: 06/30/22 0636     Procalcitonin 2.64 ng/mL     Narrative:      As a Marker for Sepsis (Non-Neonates):    1. <0.5 ng/mL represents a low risk of severe sepsis and/or septic shock.  2. >2 ng/mL represents a high risk of severe sepsis and/or septic shock.    As a Marker for Lower Respiratory Tract Infections that require antibiotic therapy:    PCT on Admission    Antibiotic Therapy       6-12 Hrs later    >0.5                Strongly Recommended  >0.25 - <0.5        Recommended   0.1 - 0.25          Discouraged              Remeasure/reassess PCT  <0.1                Strongly Discouraged     Remeasure/reassess PCT    As 28 day mortality risk marker: \"Change in Procalcitonin Result\" (>80% or <=80%) if Day 0 (or Day 1) and Day 4 values are " available. Refer to http://www.[facility]-pct-calculator.com    Change in PCT <=80%  A decrease of PCT levels below or equal to 80% defines a positive change in PCT test result representing a higher risk for 28-day all-cause mortality of patients diagnosed with severe sepsis for septic shock.    Change in PCT >80%  A decrease of PCT levels of more than 80% defines a negative change in PCT result representing a lower risk for 28-day all-cause mortality of patients diagnosed with severe sepsis or septic shock.       POC Glucose Once [[id number]]  (Abnormal) Collected: 06/30/22 0530    Specimen: Blood Updated: 06/30/22 0556     Glucose 260 mg/dL      Comment: RN NotifiedOperator: [id number] NEEL AIMEEMeter ID: [id number]       Blood Gas, Arterial - [[id number]]  (Abnormal) Collected: 06/30/22 0416    Specimen: Arterial Blood Updated: 06/30/22 0419     Site Left Radial     Shadi's Test N/A     pH, Arterial 7.421 pH units      pCO2, Arterial 39.2 mm Hg      pO2, Arterial 80.0 mm Hg      Comment: 84 Value below reference range        HCO3, Arterial 25.5 mmol/L      Base Excess, Arterial 1.0 mmol/L      O2 Saturation, Arterial 97.2 %      Barometric Pressure for Blood Gas 751 mmHg      Modality Ventilator     FIO2 30 %      Ventilator Mode SIMV     Set Tidal Volume 400     Set Mech Resp Rate 12.0     PEEP 5.0     PSV 14.0 cmH2O      PIP 19 cmH2O      Comment: Meter: [id number]     :  [id number]        Collected by [email or web address]    Blood Culture - Blood, Hand, Right [[id number]]  (Normal) Collected: 06/26/22 0210    Specimen: Blood from Hand, Right Updated: 06/30/22 0232     Blood Culture No growth at 4 days    POC Glucose Once [[id number]]  (Abnormal) Collected: 06/29/22 2006    Specimen: Blood Updated: 06/29/22 2038     Glucose 201 mg/dL      Comment: RN NotifiedOperator: [id number] LUCILA SYKES ANNMeter ID: [id number]       POC Glucose Once [[id number]]  (Abnormal) Collected: 06/29/22 1605    Specimen: Blood  Updated: 06/29/22 1639     Glucose 208 mg/dL      Comment: RN NotifiedOperator: [id number] LUCILA SYKES ANNMeter ID: [id number]       Blood Gas, Arterial - [[id number]]  (Abnormal) Collected: 06/29/22 1333    Specimen: Arterial Blood Updated: 06/29/22 1335     Site Left Radial     Shadi's Test N/A     pH, Arterial 7.427 pH units      pCO2, Arterial 43.0 mm Hg      pO2, Arterial 85.6 mm Hg      HCO3, Arterial 28.3 mmol/L      Comment: 83 Value above reference range        Base Excess, Arterial 3.5 mmol/L      Comment: 83 Value above reference range        O2 Saturation, Arterial 97.6 %      Barometric Pressure for Blood Gas 753 mmHg      Modality Ventilator     FIO2 30 %      Ventilator Mode AC     Set Tidal Volume 0.400     Set Mech Resp Rate 12.0     PEEP 5.0     PIP 17 cmH2O      Comment: Meter: [id number]     :  [id number]        Collected by Marahlorene ARIZA    POC Glucose Once [[id number]]  (Abnormal) Collected: 06/29/22 1036    Specimen: Blood Updated: 06/29/22 1116     Glucose 147 mg/dL      Comment: RN NotifiedOperator: [id number] SAMANTHA REBAMeter ID: [id number]             I reviewed the patient's new clinical results.    Assessment/Plan:     Active Hospital Problems    Diagnosis  POA   • **Altered mental status [R41.82]  Yes     Likely due to drug overdose.   - Initial ABG showed hypercapnia   - Repeat ABG today during SBT  - Patient intubated on mechanical ventilation.  Tidal volume 8 cc/kg, [employer] managing vent settings   - Soft restraint order placed   - UDS is negative  - Failed SBT yesterday, on SBT currently breathing spontaneously, not following commands. Opens eyes spontaneously. No meaningfully movements   - Monitor labs and vitals         • Ventilator associated pneumonia (HCC) [J95.851]  Yes     CXR this AM showed evidence of pneumonia with elevated Procal and CRP  On ventilatory support since admission over the last 5 days   Discontinued Cefepime on 6/30, due to worsening clinical  course and started on Meropenum 6/30 for better coverage  Spiked fever overnight, repeat blood, urine cultures ordered, respiratory culture       • Hypomagnesemia [E83.42]  Unknown     Replace as needed   Monitor EKG changes      • Accidental drug overdose [T50.901A]  Yes     Reports of 10 tablets of 10 mg of Flexeril ingested.  - Overdose labs obtained including UDS, acetaminophen, salicylate, ethanol level  - EKG showed right bundle branch block  - Poison control notified they Recommend symptomatic care  - Monitor Qtc interval changes, received dose of potassium and mag overnight to replace electrolyte   -   - Continues to be sinus tachy when performing SBTs        • On mechanically assisted ventilation (HCC) [Z99.11]  Not Applicable     Vent management and sedation orders placed.   - [facility] intensivist group consulted for vent management appreciate recommendations        • Pleural effusion on right [J90]  Yes     CXR showed a small right pleural effusion.   Last Echo 1/2022 EF 61-65 %  Continue to monitor        • Acute respiratory failure with hypoxia and hypercapnia ([facility]) [J96.01, J96.02]  Yes   • Anemia [D64.9]  Yes     Hgb 6.9 on admission   - Type and screen   - 1 unit blood ordered  - Monitor H &H   - Intubated      • ESRD on hemodialysis ([facility]) [N18.6, Z99.2]  Not Applicable     -Receives hemodialysis MWF at [facility] in [place]  Missed dialysis on Friday per family prior to arrival   Nephrology consulted appreciated recs     Tolerated HD       • Type 2 diabetes mellitus with autonomic neuropathy ([facility]) [E11.43]  Yes     On [facility] protocol - anion gap closed   On SSI, started on Levemir 10 units  Will slowly transition to home dose, levemir 25 units nightly and 12 units TID short acting as needed  Patient is still intubated therefore there is concern for stress induced hyperglycemia. Will adjust insulin regimen appropriately      • Hypothyroidism [E03.9]  Yes     -Continue home synthroid 25 mcg        • Hypertension [I10]  Yes     Restarted home medications which includes Lisinopril, Metoprolol, Imdur   - PRN Hydralazine       • Coronary artery disease involving native coronary artery of native heart without angina pectoris [I25.10]  Yes     - S/P CABG, Bilateral carotid artery stenosis w/ 2 stents on left side,  PAD (peripheral artery disease) with stent in right upper leg  - restarted heart medications today   - Cardiology on board       • COPD (chronic obstructive pulmonary disease) ([facility]) [J44.9]  Yes       Code status is   Code Status and Medical Interventions:   Ordered at: 06/25/22 2157     Level Of Support Discussed With:    Patient     Code Status (Patient has no pulse and is not breathing):    CPR (Attempt to Resuscitate)     Medical Interventions (Patient has pulse or is breathing):    Full Support       Prognosis: fair  Plan for disposition:Where: current living arrangements and When:  2-3days    Time: Critical care 30 min        Jung Leonard MD PGY-2  [facility] Family Medicine Residency   This document has been electronically signed by Jung Leonard MD on June 30, 2022 10:01 CDT          Electronically signed by Kit Brar MD at 06/30/22 1630     Progress Notes signed by Cintia Montez MD at 06/29/22 1131         [facility] Physician - Daily Progress Note  PERMANENT  06/29/2022 12:28    [facility] - CCU/SD - 20 - [place], [place] ([facility])    DALE FINK    Date of Service 06/29/2022 12:28    Current Problems & Interval Events [facility] Tele-ICU  Reason for Admission to ICU: 63 yo F presented on 6-25 with AMS, suspected flexeril overdose.  Required intubation on arrival for airway protection. She has ESRD and is on dialysis.  VS: HR 75, /65, AF, sats 98% on Fio2 30%.  Neuro: Precedex only for sedation.  Agitated.  Does not follow commands.  Pulmonary: Intubated for airway protection. Fio2 30%, PEEP 5. Adequate  oxygenation, no  hypercapnia. Using Precedex for SBT.  Still not following commands.  Not able to extubate today.  Cardiac: HD stable. QT has been prolonged but no arrythmia.  Nephro:  ESRD, dialysis dependent  ID- Empiric abx- stop.  Cts negative  GI: NPO. TFs ordered.  PPx: Heparin, Protonix    Physical Exam HR-72, BP-157/77, Resp Rate-25, O2 Sat%-97    Weight (kg): admission-135, current-127.7; I&O: intake-0, output-1149, Urine-1149, dialysis net (0), Total net (-1149)    Skin:   Lungs:   Cardiovascular:   Abdomen:   Extremities:   Wound(s):   Neurologic:     Labs Electrolyte Labs:                              CBC Labs:  Na- 136  Cl- 95     BUN- 33                              HGB- 8.6     --------+-----------+----------+- GLU- 226     WBC- 10.44 -+-----------+- PLT- 303  K- 3.5   CO2- 27.0  Cr- 2.98                             HCT- 27.7    Lab Date/Time: 2022 06:35                Lab Date/Time: 2022 05:16 WBC and PLT = * 1,000    Ventilation Respiratory: FiO2 - 30, Mode - VC/AC, PEEP - 5, Set TV - 400, TV/kg IBW - 7.9811. Date/Time -2022 11:34.        Electronically Signed by: Cintia Montez) on 2022 12:31    Electronically signed by Cintia Montez MD at 22 1131     Ace Lauren MD at 22 1025          [facility]  [address] [place] [address]. [address]  T - [phone]  F - [phone]     Progress Note          PATIENT  DEMOGRAPHICS   PATIENT NAME: Yamileth Slater                      PHYSICIAN: Ace Lauren MD  : 1960  MRN: [id number]   LOS: 4 days    Patient Care Team:  Rianna Macias MD as PCP - General (Family Medicine)  Subjective   SUBJECTIVE   remained intubated, on hd. bp on low side with hd       Objective   OBJECTIVE   Vital Signs  Temp:  [98.2 °F (36.8 °C)-99.9 °F (37.7 °C)] 98.2 °F (36.8 °C)  Heart Rate:  [] 76  Resp:  [20-26] 26  BP: (117-168)/(56-72) 154/65  FiO2 (%):  [30 %] 30  "%    Flowsheet Rows    Flowsheet Row First Filed Value   Admission Height 157.5 cm (62\") Documented at 06/25/2022 1743   Admission Weight 135 kg (297 lb) Documented at 06/25/2022 1743           I/O last 3 completed shifts:  In: 552.3 [I.V.:352.3; IV Piggyback:200]  Out: 3414 [Urine:2489; Emesis/NG output:925]    PHYSICAL EXAM    Physical Exam  Constitutional:       Appearance: She is well-developed. She is ill-appearing.   HENT:      Head: Normocephalic.   Eyes:      Pupils: Pupils are equal, round, and reactive to light.   Cardiovascular:      Rate and Rhythm: Normal rate and regular rhythm.      Heart sounds: Normal heart sounds.   Pulmonary:      Effort: Pulmonary effort is normal.      Breath sounds: Normal breath sounds.   Abdominal:      General: Bowel sounds are normal.      Palpations: Abdomen is soft.   Musculoskeletal:         General: No swelling.   Skin:     Coloration: Skin is not jaundiced.   Neurological:      Deep Tendon Reflexes: Reflexes normal.         RESULTS   Results Review:    Results from last 7 days   Lab Units 06/29/22  0635 06/28/22  0554 06/27/22 2010 06/27/22 0408   SODIUM mmol/L 136 133*  --  132*   POTASSIUM mmol/L 3.5 3.9 3.4* 3.8   CHLORIDE mmol/L 95* 95*  --  93*   CO2 mmol/L 27.0 27.0  --  25.0   BUN mg/dL 33* 24*  --  54*   CREATININE mg/dL 2.98* 2.29*  --  3.61*   CALCIUM mg/dL 9.0 9.1 9.0 8.7   BILIRUBIN mg/dL 0.4 0.5  --  0.4   ALK PHOS U/L 137* 150*  --  146*   ALT (SGPT) U/L <5 <5  --  <5   AST (SGOT) U/L 7 6  --  8   GLUCOSE mg/dL 226* 249*  --  263*       Estimated Creatinine Clearance: 24.8 mL/min (A) (by C-G formula based on SCr of 2.98 mg/dL (H)).    Results from last 7 days   Lab Units 06/28/22  0554 06/27/22 2010 06/27/22  0837 06/27/22  0408 06/26/22 2027   MAGNESIUM mg/dL 2.2 1.8 2.1 2.0 2.2   PHOSPHORUS mg/dL  --   --  4.3 4.1 3.8             Results from last 7 days   Lab Units 06/29/22  0516 06/28/22  0554 06/27/22  0536 06/26/22  1237 06/26/22  0457 " 06/25/22 2003   WBC 10*3/mm3 10.44 11.59* 10.77  --  7.34 10.64   HEMOGLOBIN g/dL 8.6* 8.7* 8.1* 8.7* 7.0* 6.1*   PLATELETS 10*3/mm3 303 245 254  --  247 317       Results from last 7 days   Lab Units 06/25/22  1832   INR  1.11         Imaging Results (Last 24 Hours)     Procedure Component Value Units Date/Time    XR Abdomen KUB [[id number]] Resulted: 06/29/22 0959     Updated: 06/29/22 0959    XR Tibia Fibula 2 View Left [[id number]] Resulted: 06/29/22 0958     Updated: 06/29/22 0958    XR Chest 1 View [[id number]] Collected: 06/28/22 1017     Updated: 06/28/22 1058    Narrative:        Rest x-ray single view.  CLINICAL INDICATION: Shortness of breath . Intubated    COMPARISON: Chest June 25, 2022    FINDINGS: Cardiac silhouette is enlarged in size.   Sternal sutures, from prior cardiac surgery. Pulmonary  vascularity is increased.     Tunneled central venous catheter right jugular approach with  catheter tip in superior vena cava in satisfactory position.  Left-sided jugular venous catheter with catheter tip in superior  vena cava.    Endotracheal tube with tip 3.6 cm above the bifurcation of the  valentina in satisfactory position. Small right-sided pleural  effusion, unchanged.      Impression:      Cardiomegaly . Small right-sided effusion.    Pulmonary vessel prominence is more pronounced than on prior  exam.    Electronically signed by:  Hugo Russo MD  6/28/2022 10:56 AM CDT  Workstation: [id number]           MEDICATIONS    bumetanide, 2 mg, Intravenous, Q12H  cefepime, 2 g, Intravenous, Q24H  chlorhexidine, 15 mL, Mouth/Throat, Q12H  epoetin hubert/hubert-epbx, 10,000 Units, Intravenous, Once per day on Mon Wed Fri  gabapentin, 300 mg, Oral, Daily  heparin (porcine), 5,000 Units, Subcutaneous, Q12H  Insulin Aspart, 0-7 Units, Subcutaneous, TID AC  insulin detemir, 10 Units, Subcutaneous, Q12H  isosorbide mononitrate, 30 mg, Oral, Q24H  lisinopril, 20 mg, Oral, Q24H  metoprolol tartrate, 25 mg, Oral,  Q12H  pantoprazole, 40 mg, Intravenous, Q24H  senna-docusate sodium, 2 tablet, Oral, BID  sodium chloride, 10 mL, Intravenous, Q12H  sodium chloride, 10 mL, Intravenous, Q12H      dexmedetomidine, 0.2-1.5 mcg/kg/hr, Last Rate: 0.4 mcg/kg/hr (06/29/22 0821)  dextrose 5 % and sodium chloride 0.45 % with KCl 20 mEq/L, 150 mL/hr  dextrose 5 % and sodium chloride 0.45 % with KCl 40 mEq/L, 150 mL/hr, Last Rate: Stopped (06/26/22 0942)  dextrose 5 % and sodium chloride 0.9 %, 150 mL/hr  dextrose 5 % and sodium chloride 0.9 % with KCl 20 mEq, 150 mL/hr  dextrose 5% and sodium chloride 0.9% with KCl 40 mEq/L, 150 mL/hr  midazolam, 1-10 mg/hr, Last Rate: Stopped (06/28/22 1131)  norepinephrine, 0.02-0.3 mcg/kg/min, Last Rate: Stopped (06/26/22 0300)  Pharmacy to Dose Cefepime,   Pharmacy Consult - Pharmacy to dose,   Pharmacy to dose vancomycin,   sodium chloride, 200 mL/hr, Last Rate: Stopped (06/26/22 0130)   And  potassium chloride, 10 mEq, Last Rate: Stopped (06/26/22 0130)  sodium chloride, 150 mL/hr, Last Rate: Stopped (06/26/22 0211)   And  potassium chloride, 10 mEq, Last Rate: Stopped (06/26/22 0212)  sodium chloride, 250 mL/hr  sodium chloride 0.45 % with KCl 20 mEq, 250 mL/hr  sodium chloride, 125 mL/hr, Last Rate: Stopped (06/25/22 2010)  sodium chloride, 250 mL/hr  sodium chloride 0.9 % with KCl 20 mEq, 250 mL/hr  sodium chloride 0.9 % with KCl 40 mEq/L, 250 mL/hr        Assessment & Plan   ASSESSMENT / PLAN      Altered mental status    Hypertension    COPD (chronic obstructive pulmonary disease) ([facility])    Coronary artery disease involving native coronary artery of native heart without angina pectoris    Hypothyroidism    Type 2 diabetes mellitus with autonomic neuropathy ([facility])    ESRD on hemodialysis ([facility])    Hypokalemia    Accidental drug overdose    On mechanically assisted ventilation ([facility])    Pleural effusion on right    Acute respiratory failure with hypoxia and hypercapnia ([facility])    Anemia     Hypomagnesemia    1.ESRD dependent on hemodialysis since October 2021.  Has tunnel catheter. Patient is on dialysis MWF at [facility].  Electrolytes are stable.  hd today. Electrolytes are stable. Keep bumex, plan for 3 L UF today     2. Anemia chronic kidney disease.  Patient had extensive work-up in the past by Dr. Munoz. She also has B12 deficiency.  She has history of AVM.  Patient is on Epogen 13K with HD.  She received 2 units of packed RBCs. hgb stable     3. htn - runs low with hd. Can skip anti htn on dialysis days    4. Possible PNA/Chronic Resp Failure related to COPD- Intubated and on the ventilator. Rec'd Vancomycin and Rocephin. Now on Maxipime. WBC 7.3. She is MRSA screen positive.Patient is on home oxygen and trilogy when sleeping.      5. Possible OD on flexeril- rec'd activated charcoal per g tube     6. DM- Glucose was 605 on arrival- management per primary team. She is not acidotic     7.coronary artery disease     8.chronic kidney disease/mineral and bone disorder on sevelamer                 This document has been electronically signed by Ace Lauren MD on June 29, 2022 10:28 CDT           Electronically signed by Ace Lauren MD at 06/29/22 1032       Medical Student Notes (last 24 hours)  Notes from 06/30/22 0846 through 07/01/22 0846   No notes of this type exist for this encounter.         Consult Notes (last 24 hours)  Notes from 06/30/22 0846 through 07/01/22 0846   No notes of this type exist for this encounter.

## 2022-07-01 NOTE — PLAN OF CARE
Passed SBT today as far as numbers were concerned.  Did not extubate due to increased secretions and fear she couldn't clear them without artificial airway.  Problem: Communication Impairment (Mechanical Ventilation, Invasive)  Goal: Effective Communication  Outcome: Ongoing, Progressing     Problem: Device-Related Complication Risk (Mechanical Ventilation, Invasive)  Goal: Optimal Device Function  Intervention: Optimize Device Care and Function  Recent Flowsheet Documentation  Taken 7/1/2022 1609 by Db Lyle RRT  Airway Safety Measures:   manual resuscitator/mask at bedside   suction at bedside  Taken 7/1/2022 1309 by Db Lyle RRT  Airway Safety Measures:   manual resuscitator/mask at bedside   suction at bedside  Taken 7/1/2022 1030 by Db Lyle RRT  Airway Safety Measures:   manual resuscitator/mask at bedside   suction at bedside  Taken 7/1/2022 0713 by Db Lyle RRT  Airway Safety Measures:   manual resuscitator/mask at bedside   suction at bedside     Problem: Inability to Wean (Mechanical Ventilation, Invasive)  Goal: Mechanical Ventilation Liberation  Outcome: Ongoing, Not Progressing     Problem: Nutrition Impairment (Mechanical Ventilation, Invasive)  Goal: Optimal Nutrition Delivery  Outcome: Ongoing, Progressing     Problem: Ventilator-Induced Lung Injury (Mechanical Ventilation, Invasive)  Goal: Absence of Ventilator-Induced Lung Injury  Outcome: Ongoing, Progressing   Goal Outcome Evaluation:

## 2022-07-01 NOTE — PROGRESS NOTES
Adult Nutrition  Assessment    Patient Name:  Yamileth Slater  YOB: 1960  MRN: [id number]  Admit Date:  6/25/2022    Assessment Date:  7/1/2022    Comments:  Pt remains NPO on the vent.  Dialysis today.  Cortrak placed and EN started.  Currently at 76% of goal rate. Good tolerance except for abd pain per MD assessment.  Abd pain related to multiple possibilities, on a bowel regimen.   Failed SBT but following some commands.  Glucose elevated, insulin regimen being adjusted.  Will continue to follow POC and tube feeding tolerance.      Reason for Assessment     Row Name 07/01/22 1413          Reason for Assessment    Reason For Assessment follow-up protocol                    Labs/Tests/Procedures/Meds     Row Name 07/01/22 1413          Labs/Procedures/Meds    Lab Results Reviewed reviewed, pertinent     Lab Results Comments Gluc 202/289/347; Na 134; Bun 37; Creat 3.678; crp 28.11; H/H 9.5/30.3            Diagnostic Tests/Procedures    Diagnostic Test/Procedure Reviewed reviewed, pertinent     Diagnostic Test/Procedures Comments Vent management; dialysis; PRBC            Medications    Pertinent Medications Reviewed reviewed, pertinent     Pertinent Medications Comments Bumex; Epogen; SSI; Novolog tid; LEvemir; Zyvox; Pericolace; Precedex                Physical Findings     Row Name 07/01/22 1415          Physical Findings    Overall Physical Appearance sedated on the vent     Enteral Access Devices nasoduodenal tube                    Evaluation of Received Nutrient/Fluid Intake     Row Name 07/01/22 1416          Calories Evaluation    Total Calorie Target (kcal) 1500            Protein Evaluation    Total Protein Target (g) 89                     Electronically signed by:  Gissel Littlejohn RD  07/01/22 14:22 CDT

## 2022-07-01 NOTE — SIGNIFICANT NOTE
Paged by NS in relation to correct positioning of feeding tube placement. Contacted [facility], requested repeat reading and imaging.     Signature  Froylan Lu MD  [address], [place] [address]  Office: [phone]      This document has been electronically signed by Froylna Lu MD on June 30, 2022 19:59 CDT

## 2022-07-01 NOTE — PLAN OF CARE
Goal Outcome Evaluation:  Plan of Care Reviewed With: caregiver        Progress: improving  Outcome Evaluation: Pt remains NPO on the vent.  EN started and currently at 76% of goal rate.   Will continue to monitor POC and tube feeding tolerance.

## 2022-07-01 NOTE — PLAN OF CARE
Goal Outcome Evaluation:  Plan of Care Reviewed With: patient           Outcome Evaluation: pt tolerating tube feeding, finally had BM but was positive for occult, pt having copious secretions requiring frequent suctioning and lavage

## 2022-07-01 NOTE — PROGRESS NOTES
Pt remains on vent. RT adjusted vent settings to SIMV 10, PS 12, , PEEP 5, 30%. Pt tolerated changes. RT obtained and sent AM ABG.

## 2022-07-01 NOTE — PROGRESS NOTES
"[facility]  [address] [place] [address]. [id number]  T - [phone]  F - [phone]     Progress Note          PATIENT  DEMOGRAPHICS   PATIENT NAME: Yamileth Slater                      PHYSICIAN: Ace Lauren MD  : 1960  MRN: [id number]   LOS: 6 days    Patient Care Team:  Rianna Macias MD as PCP - General (Family Medicine)  Subjective   SUBJECTIVE   remained intubated, bp on low side. Had ivf earlier and now stopped         Objective   OBJECTIVE   Vital Signs  Temp:  [97.1 °F (36.2 °C)-99.7 °F (37.6 °C)] 97.1 °F (36.2 °C)  Heart Rate:  [63-87] 79  Resp:  [22-31] 22  BP: (102-201)/(53-86) 102/54  FiO2 (%):  [30 %] 30 %    Flowsheet Rows    Flowsheet Row First Filed Value   Admission Height 157.5 cm (62\") Documented at 2022 1743   Admission Weight 135 kg (297 lb) Documented at 2022 1743           I/O last 3 completed shifts:  In: 1095 [I.V.:1095]  Out: 550 [Urine:250; Emesis/NG output:300]    PHYSICAL EXAM    Physical Exam  Constitutional:       Appearance: She is well-developed. She is ill-appearing.   HENT:      Head: Normocephalic.   Eyes:      Pupils: Pupils are equal, round, and reactive to light.   Cardiovascular:      Rate and Rhythm: Normal rate and regular rhythm.      Heart sounds: Normal heart sounds.   Pulmonary:      Effort: Pulmonary effort is normal.      Breath sounds: Normal breath sounds.   Abdominal:      General: Bowel sounds are normal.      Palpations: Abdomen is soft.   Musculoskeletal:         General: No swelling.   Skin:     Coloration: Skin is not jaundiced.   Neurological:      Deep Tendon Reflexes: Reflexes normal.         RESULTS   Results Review:    Results from last 7 days   Lab Units 22  0558 22  0601 22  0635   SODIUM mmol/L 134* 132* 136   POTASSIUM mmol/L 4.1 4.3 3.5   CHLORIDE mmol/L 94* 93* 95*   CO2 mmol/L 24.0 19.0* 27.0   BUN mg/dL 37* 24* 33*   CREATININE mg/dL 3.67* 2.92* 2.98*   CALCIUM mg/dL 9.3 " 9.2 9.0   BILIRUBIN mg/dL 0.3 0.4 0.4   ALK PHOS U/L 144* 148* 137*   ALT (SGPT) U/L <5 <5 <5   AST (SGOT) U/L 10 14 7   GLUCOSE mg/dL 289* 260* 226*       Estimated Creatinine Clearance: 19.8 mL/min (A) (by C-G formula based on SCr of 3.67 mg/dL (H)).    Results from last 7 days   Lab Units 07/01/22  0558 06/28/22  0554 06/27/22 2010 06/27/22  0837 06/27/22  0408 06/26/22 2027   MAGNESIUM mg/dL 1.9 2.2 1.8 2.1 2.0 2.2   PHOSPHORUS mg/dL  --   --   --  4.3 4.1 3.8             Results from last 7 days   Lab Units 07/01/22  0548 06/30/22  0716 06/29/22  0516 06/28/22  0554 06/27/22  0536   WBC 10*3/mm3 12.12* 11.78* 10.44 11.59* 10.77   HEMOGLOBIN g/dL 9.5* 10.0* 8.6* 8.7* 8.1*   PLATELETS 10*3/mm3 347 265 303 245 254       Results from last 7 days   Lab Units 06/25/22  1832   INR  1.11         Imaging Results (Last 24 Hours)     Procedure Component Value Units Date/Time    XR Chest 1 View [[id number]] Resulted: 07/01/22 0519     Updated: 07/01/22 0600    XR Abdomen KUB [[id number]] Collected: 06/30/22 2100     Updated: 06/30/22 2153    Narrative:      EXAM:    XR Abdomen, 1 View    CLINICAL HISTORY:    The patient is 62 years old and is Female; Feeding tube  placement, R41.82 Altered mental status, unspecified J96.00 Acute  respiratory failure, unspecified whether with hypoxia or  hypercapnia J81.0 Acute pulmonary edema N18.6 End stage renal  disease R73.9 Hyperglycemia, unspecified    TECHNIQUE:    Frontal supine view of the abdomen/pelvis.    COMPARISON:    exam earlier today    FINDINGS:    GASTROINTESTINAL TRACT:  Unremarkable.  No dilation.    BONES/JOINTS:  Moderate DJD.    TUBES, LINES AND DEVICES:  Feeding tube extends into the  proximal duodenum.      Impression:        Feeding tube extends into the proximal duodenum.    Electronically signed by:  Nirmal Jimenez MD  6/30/2022 9:51 PM  CDT Workstation: [id number]    XR Abdomen KUB [[id number]] Collected: 06/30/22 1849     Updated: 06/30/22 1947    Narrative:       INDICATION: Cortrak placement, R41.82 Altered mental status,  unspecified J96.00 Acute respiratory failure, unspecified whether  with hypoxia or hypercapnia J81.0 Acute pulmonary edema N18.6 End  stage renal disease R73.9 Hyperglycemia, unspecified    EXAMINATION/TECHNIQUE: Portable view upper abdomen    COMPARISON: None.  ____________________________________________    FINDINGS:    Feeding tube is positioned well below the diaphragm. The images  do not include the right upper quadrant. The feeding tube crosses  the midline and appears to curve into the right upper quadrant.  It may be positioned near the gastric antrum. Follow-up is  recommended      Impression:      The entire distal feeding tube is not included on this single  portable image. Tip appears to project near the gastric antrum    Electronically signed by:  Pablo Rubio MD  6/30/2022 7:45 PM CDT  Workstation: [id number]    CT Abdomen Pelvis Without Contrast [[id number]] Collected: 06/30/22 1212     Updated: 06/30/22 1632    Narrative:      CT ABDOMEN PELVIS WITHOUT CONTRAST    INDICATION: concern for abscess vs diverticulitis, R41.82 Altered  mental status, unspecified J96.00 Acute respiratory failure,  unspecified whether with hypoxia or hypercapnia J81.0 Acute  pulmonary edema N18.6 End stage renal disease R73.9  Hyperglycemia, unspecified    COMPARISON: CT abdomen pelvis without contrast 12/18/2020    TECHNIQUE: Spiral CT images were acquired of the abdomen and  pelvis with multiplanar reconstructions without the  administration of oral or IV contrast.    CONTRAST: None    FINDINGS:  Limited evaluation due to patient body habitus with contact with  the CT gantry producing extensive streak artifact.  INFERIOR THORAX: Coronary atherosclerosis or stents.  Cardiomegaly. Sternotomy wires. Small right pleural effusion.  Groundglass opacities are present at bilateral lung bases with  consolidative opacities and bandlike opacities.  LIVER:  Granulomas.  GALLBLADDER: There is haziness surrounding the gallbladder which  could also relate to motion artifact or streak artifact. There  are possible gallstones.  SPLEEN: Granulomas.  PANCREAS: Unremarkable  ADRENAL GLANDS: Unremarkable  KIDNEYS: Mild bilateral perinephric stranding.  URETERS: Unremarkable  BLADDER: Decompressed with Forman  REPRODUCTIVE: Hysterectomy  GASTROINTESTINAL: Nasogastric tube with tip terminating in  stomach. Unremarkable appendix.  VASCULAR: Severe calcific atherosclerosis.  LYMPH NODES: No lymphadenopathy per CT criteria.  PERITONEUM/RETROPERITONEUM: Unremarkable.   OSSEOUS STRUCTURES: Degenerative change bilateral SI joints.  Straightening of the lumbar lordosis with lumbar spondylosis and  degenerative disc disease.  SOFT TISSUES: Unremarkable      Impression:      1. No evidence of abscess or diverticulitis.  2. Haziness surrounding the gallbladder in a region of the scan  that it exhibits motion artifact. This could relate to motion or  streak artifact. However if there is concern for possible  cholecystitis further evaluation could be obtained with  gallbladder ultrasound or HIDA.  3. Possible cholelithiasis.  4. Cardiomegaly.  5. Small right pleural effusion.  6. Groundglass opacities, consolidative opacities, and bandlike  opacities at the bilateral lung bases, which could represent  pneumonia, atelectasis, or edema.   7. Degenerative change bilateral SI joints.   8. Lumbar spondylosis.  9. Degenerative disc disease.    Electronically signed by:  Yoon Malloy MD  6/30/2022 4:30 PM CDT  Workstation: [id number]           MEDICATIONS    bumetanide, 2 mg, Intravenous, Daily  chlorhexidine, 15 mL, Mouth/Throat, Q12H  epoetin hubert/hubert-epbx, 10,000 Units, Intravenous, Once per day on Mon Wed Fri  gabapentin, 300 mg, Oral, Daily  heparin (porcine), 5,000 Units, Subcutaneous, Q12H  Insulin Aspart, 0-7 Units, Subcutaneous, TID AC  Insulin Aspart, 8 Units, Subcutaneous, TID With  Meals  insulin detemir, 15 Units, Subcutaneous, Q12H  isosorbide mononitrate, 30 mg, Oral, Q24H  levothyroxine sodium, 18.75 mcg, Intravenous, Daily  Linezolid, 600 mg, Intravenous, Q12H  [START ON 7/2/2022] lisinopril, 20 mg, Oral, Once per day on Sun Tue Thu Sat  meropenem, 1 g, Intravenous, Q12H  metoprolol tartrate, 25 mg, Oral, Q12H  pantoprazole, 40 mg, Intravenous, Q24H  senna-docusate sodium, 2 tablet, Oral, BID  sodium chloride, 10 mL, Intravenous, Q12H  sodium chloride, 10 mL, Intravenous, Q12H  sodium chloride, 10 mL, Intravenous, Q12H      dexmedetomidine, 0.2-1.5 mcg/kg/hr, Last Rate: 0.2 mcg/kg/hr (07/01/22 1200)  Pharmacy to Dose meropenem (MERREM),   potassium chloride, 10 mEq, Last Rate: Stopped (06/26/22 0130)  potassium chloride, 10 mEq, Last Rate: Stopped (06/26/22 0212)        Assessment & Plan   ASSESSMENT / PLAN      Altered mental status    Hypertension    COPD (chronic obstructive pulmonary disease) (H[facility])    Coronary artery disease involving native coronary artery of native heart without angina pectoris    Hypothyroidism    Acute cystitis with hematuria    Type 2 diabetes mellitus with autonomic neuropathy (H[facility])    ESRD on hemodialysis (HCC)    Accidental drug overdose    On mechanically assisted ventilation (HCC)    Pleural effusion on right    Acute respiratory failure with hypoxia and hypercapnia ([facility]CC)    Anemia    Ventilator associated pneumonia (HCC)    Generalized abdominal pain    1.ESRD dependent on hemodialysis since October 2021.  Has tunnel catheter. Patient is on dialysis MWF at [facility].  Electrolytes are stable.  had hd today. Lost weight from 297 to 275 lbs and now on bumex daily. Appears euvolemic now     2. Anemia chronic kidney disease.  Patient had extensive work-up in the past by Dr. Munoz. She also has B12 deficiency.  She has history of AVM.  Patient is on Epogen 10K with HD.  She received 2 units of packed RBCs. hgb stable and improved. Lower epogen to 6000  units     3. htn - runs low with hd. Lisinopril on non dialysis day    4. Possible PNA/Chronic Resp Failure related to COPD- Intubated and on the ventilator. She is MRSA screen positive.Patient is on home oxygen and trilogy when sleeping.  On meropenem and zyvox     5. Possible OD on flexeril- rec'd activated charcoal per g tube     6. DM- Glucose was 605 on arrival- management per primary team.      7.coronary artery disease     8.chronic kidney disease/mineral and bone disorder on sevelamer                 This document has been electronically signed by Ace Lauren MD on July 1, 2022 13:44 CDT

## 2022-07-01 NOTE — NURSING NOTE
Notified Dr. Hooker of pts positive hemoccult stool. This RN and respiratory spoke to Dr. Hooker in regards to pts SBT, pt having copious amount of secretions requiring frequent suction, and lavage suction. Concerns regarding extubating pt and her unable to maintain airway due to secretions.

## 2022-07-01 NOTE — PLAN OF CARE
Goal Outcome Evaluation:  Plan of Care Reviewed With: patient        Progress: improving  Outcome Evaluation: Pt vss overnight other than some HTN. Pt required 1 dose of PRN hydralazine. Pt opens eyes and makes eye contact and tracks to some extent but will not or is not able to follow any commands. HD likely today as pt is MWF HD. Pt condition discussed with primary group and attempt to extubate may happen today or tomorrow. Will continue to monitor.

## 2022-07-02 PROBLEM — R10.84 GENERALIZED ABDOMINAL PAIN: Status: RESOLVED | Noted: 2022-07-01 | Resolved: 2022-07-02

## 2022-07-02 PROBLEM — J90 PLEURAL EFFUSION ON RIGHT: Status: RESOLVED | Noted: 2022-06-26 | Resolved: 2022-07-02

## 2022-07-02 PROBLEM — R19.5 POSITIVE FECAL OCCULT BLOOD TEST: Status: ACTIVE | Noted: 2022-07-02

## 2022-07-02 LAB
ALBUMIN SERPL-MCNC: 3.3 G/DL (ref 3.5–5.2)
ALBUMIN/GLOB SERPL: 0.8 G/DL
ALP SERPL-CCNC: 163 U/L (ref 39–117)
ALT SERPL W P-5'-P-CCNC: <5 U/L (ref 1–33)
ANION GAP SERPL CALCULATED.3IONS-SCNC: 14 MMOL/L (ref 5–15)
ARTERIAL PATENCY WRIST A: POSITIVE
AST SERPL-CCNC: 7 U/L (ref 1–32)
ATMOSPHERIC PRESS: 747 MMHG
BACTERIA SPEC AEROBE CULT: NORMAL
BACTERIA SPEC RESP CULT: ABNORMAL
BACTERIA SPEC RESP CULT: ABNORMAL
BACTERIA UR QL AUTO: ABNORMAL /HPF
BACTERIA UR QL AUTO: ABNORMAL /HPF
BASE EXCESS BLDA CALC-SCNC: 4.2 MMOL/L (ref 0–2)
BASOPHILS # BLD AUTO: 0.1 10*3/MM3 (ref 0–0.2)
BASOPHILS NFR BLD AUTO: 0.9 % (ref 0–1.5)
BDY SITE: ABNORMAL
BILIRUB SERPL-MCNC: 0.3 MG/DL (ref 0–1.2)
BILIRUB UR QL STRIP: ABNORMAL
BILIRUB UR QL STRIP: NEGATIVE
BUN SERPL-MCNC: 25 MG/DL (ref 8–23)
BUN/CREAT SERPL: 8.3 (ref 7–25)
CALCIUM SPEC-SCNC: 9.5 MG/DL (ref 8.6–10.5)
CHLORIDE SERPL-SCNC: 88 MMOL/L (ref 98–107)
CLARITY UR: ABNORMAL
CLARITY UR: ABNORMAL
CO2 SERPL-SCNC: 27 MMOL/L (ref 22–29)
COLOR UR: ABNORMAL
COLOR UR: ABNORMAL
CREAT SERPL-MCNC: 3 MG/DL (ref 0.57–1)
CRP SERPL-MCNC: 17.34 MG/DL (ref 0–0.5)
DEPRECATED RDW RBC AUTO: 50.9 FL (ref 37–54)
EGFRCR SERPLBLD CKD-EPI 2021: 17 ML/MIN/1.73
EOSINOPHIL # BLD AUTO: 0.32 10*3/MM3 (ref 0–0.4)
EOSINOPHIL NFR BLD AUTO: 2.8 % (ref 0.3–6.2)
ERYTHROCYTE [DISTWIDTH] IN BLOOD BY AUTOMATED COUNT: 16.6 % (ref 12.3–15.4)
GLOBULIN UR ELPH-MCNC: 4.2 GM/DL
GLUCOSE BLDC GLUCOMTR-MCNC: 221 MG/DL (ref 70–130)
GLUCOSE BLDC GLUCOMTR-MCNC: 235 MG/DL (ref 70–130)
GLUCOSE BLDC GLUCOMTR-MCNC: 260 MG/DL (ref 70–130)
GLUCOSE BLDC GLUCOMTR-MCNC: 340 MG/DL (ref 70–130)
GLUCOSE SERPL-MCNC: 336 MG/DL (ref 65–99)
GLUCOSE UR STRIP-MCNC: ABNORMAL MG/DL
GLUCOSE UR STRIP-MCNC: ABNORMAL MG/DL
GRAM STN SPEC: ABNORMAL
GRAM STN SPEC: NORMAL
GRAM STN SPEC: NORMAL
HCO3 BLDA-SCNC: 29.2 MMOL/L (ref 20–26)
HCT VFR BLD AUTO: 30.1 % (ref 34–46.6)
HGB BLD-MCNC: 9.4 G/DL (ref 12–15.9)
HGB UR QL STRIP.AUTO: ABNORMAL
HGB UR QL STRIP.AUTO: ABNORMAL
HYALINE CASTS UR QL AUTO: ABNORMAL /LPF
HYALINE CASTS UR QL AUTO: ABNORMAL /LPF
IMM GRANULOCYTES # BLD AUTO: 0.14 10*3/MM3 (ref 0–0.05)
IMM GRANULOCYTES NFR BLD AUTO: 1.2 % (ref 0–0.5)
INHALED O2 CONCENTRATION: 30 %
KETONES UR QL STRIP: ABNORMAL
KETONES UR QL STRIP: ABNORMAL
LEUKOCYTE ESTERASE UR QL STRIP.AUTO: ABNORMAL
LEUKOCYTE ESTERASE UR QL STRIP.AUTO: ABNORMAL
LYMPHOCYTES # BLD AUTO: 2.03 10*3/MM3 (ref 0.7–3.1)
LYMPHOCYTES NFR BLD AUTO: 17.6 % (ref 19.6–45.3)
Lab: ABNORMAL
MAGNESIUM SERPL-MCNC: 2.6 MG/DL (ref 1.6–2.4)
MCH RBC QN AUTO: 26.7 PG (ref 26.6–33)
MCHC RBC AUTO-ENTMCNC: 31.2 G/DL (ref 31.5–35.7)
MCV RBC AUTO: 85.5 FL (ref 79–97)
MODALITY: ABNORMAL
MONOCYTES # BLD AUTO: 0.98 10*3/MM3 (ref 0.1–0.9)
MONOCYTES NFR BLD AUTO: 8.5 % (ref 5–12)
NEUTROPHILS NFR BLD AUTO: 69 % (ref 42.7–76)
NEUTROPHILS NFR BLD AUTO: 7.96 10*3/MM3 (ref 1.7–7)
NITRITE UR QL STRIP: NEGATIVE
NITRITE UR QL STRIP: NEGATIVE
NRBC BLD AUTO-RTO: 0 /100 WBC (ref 0–0.2)
PCO2 BLDA: 44.8 MM HG (ref 35–45)
PEEP RESPIRATORY: 5 CM[H2O]
PH BLDA: 7.42 PH UNITS (ref 7.35–7.45)
PH UR STRIP.AUTO: 5.5 [PH] (ref 5–9)
PH UR STRIP.AUTO: 5.5 [PH] (ref 5–9)
PLATELET # BLD AUTO: 259 10*3/MM3 (ref 140–450)
PMV BLD AUTO: 8.5 FL (ref 6–12)
PO2 BLDA: 96.3 MM HG (ref 83–108)
POTASSIUM SERPL-SCNC: 3.1 MMOL/L (ref 3.5–5.2)
POTASSIUM SERPL-SCNC: 4.4 MMOL/L (ref 3.5–5.2)
PROCALCITONIN SERPL-MCNC: 2.36 NG/ML (ref 0–0.25)
PROT SERPL-MCNC: 7.5 G/DL (ref 6–8.5)
PROT UR QL STRIP: ABNORMAL
PROT UR QL STRIP: ABNORMAL
PSV: 8 CMH2O
QT INTERVAL: 466 MS
QTC INTERVAL: 517 MS
RBC # BLD AUTO: 3.52 10*6/MM3 (ref 3.77–5.28)
RBC # UR STRIP: ABNORMAL /HPF
RBC # UR STRIP: ABNORMAL /HPF
REF LAB TEST METHOD: ABNORMAL
REF LAB TEST METHOD: ABNORMAL
SAO2 % BLDCOA: 98.7 % (ref 94–99)
SODIUM SERPL-SCNC: 129 MMOL/L (ref 136–145)
SP GR UR STRIP: 1.02 (ref 1–1.03)
SP GR UR STRIP: 1.03 (ref 1–1.03)
SQUAMOUS #/AREA URNS HPF: ABNORMAL /HPF
SQUAMOUS #/AREA URNS HPF: ABNORMAL /HPF
UROBILINOGEN UR QL STRIP: ABNORMAL
UROBILINOGEN UR QL STRIP: ABNORMAL
VENTILATOR MODE: ABNORMAL
WBC # UR STRIP: ABNORMAL /HPF
WBC # UR STRIP: ABNORMAL /HPF
WBC NRBC COR # BLD: 11.53 10*3/MM3 (ref 3.4–10.8)
YEAST URNS QL MICRO: ABNORMAL /HPF

## 2022-07-02 PROCEDURE — 94003 VENT MGMT INPAT SUBQ DAY: CPT

## 2022-07-02 PROCEDURE — 87086 URINE CULTURE/COLONY COUNT: CPT | Performed by: STUDENT IN AN ORGANIZED HEALTH CARE EDUCATION/TRAINING PROGRAM

## 2022-07-02 PROCEDURE — 86140 C-REACTIVE PROTEIN: CPT | Performed by: STUDENT IN AN ORGANIZED HEALTH CARE EDUCATION/TRAINING PROGRAM

## 2022-07-02 PROCEDURE — 84145 PROCALCITONIN (PCT): CPT | Performed by: STUDENT IN AN ORGANIZED HEALTH CARE EDUCATION/TRAINING PROGRAM

## 2022-07-02 PROCEDURE — 94799 UNLISTED PULMONARY SVC/PX: CPT

## 2022-07-02 PROCEDURE — 84132 ASSAY OF SERUM POTASSIUM: CPT | Performed by: INTERNAL MEDICINE

## 2022-07-02 PROCEDURE — 82803 BLOOD GASES ANY COMBINATION: CPT

## 2022-07-02 PROCEDURE — 25010000002 LINEZOLID 600 MG/300ML SOLUTION: Performed by: STUDENT IN AN ORGANIZED HEALTH CARE EDUCATION/TRAINING PROGRAM

## 2022-07-02 PROCEDURE — 63710000001 INSULIN ASPART PER 5 UNITS: Performed by: STUDENT IN AN ORGANIZED HEALTH CARE EDUCATION/TRAINING PROGRAM

## 2022-07-02 PROCEDURE — 25010000002 PROPOFOL 10 MG/ML EMULSION: Performed by: CHIROPRACTOR

## 2022-07-02 PROCEDURE — 94760 N-INVAS EAR/PLS OXIMETRY 1: CPT

## 2022-07-02 PROCEDURE — 83735 ASSAY OF MAGNESIUM: CPT | Performed by: STUDENT IN AN ORGANIZED HEALTH CARE EDUCATION/TRAINING PROGRAM

## 2022-07-02 PROCEDURE — 25010000002 CEFTRIAXONE PER 250 MG: Performed by: STUDENT IN AN ORGANIZED HEALTH CARE EDUCATION/TRAINING PROGRAM

## 2022-07-02 PROCEDURE — 99232 SBSQ HOSP IP/OBS MODERATE 35: CPT | Performed by: INTERNAL MEDICINE

## 2022-07-02 PROCEDURE — 92610 EVALUATE SWALLOWING FUNCTION: CPT | Performed by: SPEECH-LANGUAGE PATHOLOGIST

## 2022-07-02 PROCEDURE — 81001 URINALYSIS AUTO W/SCOPE: CPT | Performed by: STUDENT IN AN ORGANIZED HEALTH CARE EDUCATION/TRAINING PROGRAM

## 2022-07-02 PROCEDURE — 99291 CRITICAL CARE FIRST HOUR: CPT | Performed by: STUDENT IN AN ORGANIZED HEALTH CARE EDUCATION/TRAINING PROGRAM

## 2022-07-02 PROCEDURE — 63710000001 INSULIN DETEMIR PER 5 UNITS: Performed by: STUDENT IN AN ORGANIZED HEALTH CARE EDUCATION/TRAINING PROGRAM

## 2022-07-02 PROCEDURE — 82962 GLUCOSE BLOOD TEST: CPT

## 2022-07-02 PROCEDURE — 36600 WITHDRAWAL OF ARTERIAL BLOOD: CPT

## 2022-07-02 PROCEDURE — 80053 COMPREHEN METABOLIC PANEL: CPT | Performed by: STUDENT IN AN ORGANIZED HEALTH CARE EDUCATION/TRAINING PROGRAM

## 2022-07-02 PROCEDURE — 97162 PT EVAL MOD COMPLEX 30 MIN: CPT

## 2022-07-02 PROCEDURE — 85025 COMPLETE CBC W/AUTO DIFF WBC: CPT | Performed by: STUDENT IN AN ORGANIZED HEALTH CARE EDUCATION/TRAINING PROGRAM

## 2022-07-02 RX ORDER — POTASSIUM CHLORIDE 7.45 MG/ML
10 INJECTION INTRAVENOUS
Status: DISCONTINUED | OUTPATIENT
Start: 2022-07-02 | End: 2022-07-07 | Stop reason: HOSPADM

## 2022-07-02 RX ORDER — INSULIN ASPART 100 [IU]/ML
8 INJECTION, SOLUTION INTRAVENOUS; SUBCUTANEOUS ONCE
Status: COMPLETED | OUTPATIENT
Start: 2022-07-02 | End: 2022-07-02

## 2022-07-02 RX ORDER — BUMETANIDE 1 MG/1
2 TABLET ORAL
Status: DISCONTINUED | OUTPATIENT
Start: 2022-07-05 | End: 2022-07-07 | Stop reason: HOSPADM

## 2022-07-02 RX ORDER — NYSTATIN 100000 [USP'U]/G
POWDER TOPICAL EVERY 12 HOURS SCHEDULED
Status: DISCONTINUED | OUTPATIENT
Start: 2022-07-02 | End: 2022-07-07 | Stop reason: HOSPADM

## 2022-07-02 RX ORDER — SCOLOPAMINE TRANSDERMAL SYSTEM 1 MG/1
1 PATCH, EXTENDED RELEASE TRANSDERMAL
Status: DISCONTINUED | OUTPATIENT
Start: 2022-07-02 | End: 2022-07-05

## 2022-07-02 RX ORDER — POTASSIUM CHLORIDE 1.5 G/1.77G
40 POWDER, FOR SOLUTION ORAL AS NEEDED
Status: DISCONTINUED | OUTPATIENT
Start: 2022-07-02 | End: 2022-07-07 | Stop reason: HOSPADM

## 2022-07-02 RX ORDER — POTASSIUM CHLORIDE 750 MG/1
40 CAPSULE, EXTENDED RELEASE ORAL AS NEEDED
Status: DISCONTINUED | OUTPATIENT
Start: 2022-07-02 | End: 2022-07-07 | Stop reason: HOSPADM

## 2022-07-02 RX ORDER — INSULIN ASPART 100 [IU]/ML
8 INJECTION, SOLUTION INTRAVENOUS; SUBCUTANEOUS 4 TIMES DAILY
Status: DISCONTINUED | OUTPATIENT
Start: 2022-07-02 | End: 2022-07-06

## 2022-07-02 RX ADMIN — METOPROLOL TARTRATE 25 MG: 25 TABLET, FILM COATED ORAL at 08:02

## 2022-07-02 RX ADMIN — NYSTATIN: 100000 POWDER TOPICAL at 20:39

## 2022-07-02 RX ADMIN — Medication 10 ML: at 08:03

## 2022-07-02 RX ADMIN — NYSTATIN: 100000 POWDER TOPICAL at 10:36

## 2022-07-02 RX ADMIN — CEFTRIAXONE SODIUM 1 G: 1 INJECTION, POWDER, FOR SOLUTION INTRAMUSCULAR; INTRAVENOUS at 09:54

## 2022-07-02 RX ADMIN — Medication 10 ML: at 20:37

## 2022-07-02 RX ADMIN — Medication 10 ML: at 20:36

## 2022-07-02 RX ADMIN — LINEZOLID 600 MG: 600 INJECTION, SOLUTION INTRAVENOUS at 10:50

## 2022-07-02 RX ADMIN — CHLORHEXIDINE GLUCONATE 15 ML: 1.2 SOLUTION ORAL at 20:36

## 2022-07-02 RX ADMIN — ISOSORBIDE MONONITRATE 30 MG: 30 TABLET, EXTENDED RELEASE ORAL at 08:02

## 2022-07-02 RX ADMIN — INSULIN DETEMIR 15 UNITS: 100 INJECTION, SOLUTION SUBCUTANEOUS at 08:06

## 2022-07-02 RX ADMIN — POTASSIUM CHLORIDE 40 MEQ: 1.5 POWDER, FOR SOLUTION ORAL at 04:47

## 2022-07-02 RX ADMIN — Medication 10 ML: at 08:01

## 2022-07-02 RX ADMIN — INSULIN ASPART 5 UNITS: 100 INJECTION, SOLUTION INTRAVENOUS; SUBCUTANEOUS at 06:39

## 2022-07-02 RX ADMIN — PROPOFOL 30 MCG/KG/MIN: 10 INJECTION, EMULSION INTRAVENOUS at 04:47

## 2022-07-02 RX ADMIN — INSULIN ASPART 4 UNITS: 100 INJECTION, SOLUTION INTRAVENOUS; SUBCUTANEOUS at 11:12

## 2022-07-02 RX ADMIN — METOPROLOL TARTRATE 25 MG: 25 TABLET, FILM COATED ORAL at 20:36

## 2022-07-02 RX ADMIN — LISINOPRIL 20 MG: 20 TABLET ORAL at 08:02

## 2022-07-02 RX ADMIN — INSULIN ASPART 3 UNITS: 100 INJECTION, SOLUTION INTRAVENOUS; SUBCUTANEOUS at 17:30

## 2022-07-02 RX ADMIN — PROPOFOL 25 MCG/KG/MIN: 10 INJECTION, EMULSION INTRAVENOUS at 02:22

## 2022-07-02 RX ADMIN — INSULIN ASPART 8 UNITS: 100 INJECTION, SOLUTION INTRAVENOUS; SUBCUTANEOUS at 07:53

## 2022-07-02 RX ADMIN — INSULIN ASPART 8 UNITS: 100 INJECTION, SOLUTION INTRAVENOUS; SUBCUTANEOUS at 11:12

## 2022-07-02 RX ADMIN — LINEZOLID 600 MG: 600 INJECTION, SOLUTION INTRAVENOUS at 23:00

## 2022-07-02 RX ADMIN — POTASSIUM CHLORIDE 40 MEQ: 1.5 POWDER, FOR SOLUTION ORAL at 08:02

## 2022-07-02 RX ADMIN — INSULIN ASPART 8 UNITS: 100 INJECTION, SOLUTION INTRAVENOUS; SUBCUTANEOUS at 07:54

## 2022-07-02 RX ADMIN — BUMETANIDE 2 MG: 0.25 INJECTION, SOLUTION INTRAMUSCULAR; INTRAVENOUS at 08:01

## 2022-07-02 RX ADMIN — LEVOTHYROXINE SODIUM ANHYDROUS 18.75 MCG: 100 INJECTION, POWDER, LYOPHILIZED, FOR SOLUTION INTRAVENOUS at 10:33

## 2022-07-02 RX ADMIN — CHLORHEXIDINE GLUCONATE 15 ML: 1.2 SOLUTION ORAL at 08:09

## 2022-07-02 RX ADMIN — SCOLOPAMINE TRANSDERMAL SYSTEM 1 PATCH: 1 PATCH, EXTENDED RELEASE TRANSDERMAL at 11:11

## 2022-07-02 RX ADMIN — POTASSIUM CHLORIDE 40 MEQ: 1.5 POWDER, FOR SOLUTION ORAL at 12:10

## 2022-07-02 RX ADMIN — PANTOPRAZOLE SODIUM 40 MG: 40 INJECTION, POWDER, FOR SOLUTION INTRAVENOUS at 08:01

## 2022-07-02 RX ADMIN — INSULIN DETEMIR 15 UNITS: 100 INJECTION, SOLUTION SUBCUTANEOUS at 20:39

## 2022-07-02 RX ADMIN — GABAPENTIN 300 MG: 250 SUSPENSION ORAL at 09:53

## 2022-07-02 RX ADMIN — INSULIN ASPART 8 UNITS: 100 INJECTION, SOLUTION INTRAVENOUS; SUBCUTANEOUS at 20:38

## 2022-07-02 NOTE — PROGRESS NOTES
FAMILY MEDICINE RESIDENCY SERVICE  DAILY PROGRESS NOTE    NAME: Yamileth Slater  : 1960  MRN: [id number]      LOS: 7 days     PROVIDER OF SERVICE: Perez Hooker MD    Chief Complaint: Altered mental status    Subjective:     Interval History:  History taken from: patient chart  Patient reports abdominal pain resolved this am. Still intubated but alert and responding appropriately to questions. Will Squeeze hand when instructed.     Review of Systems:   Review of Systems   Constitutional: Negative for fever.   Respiratory: Negative for shortness of breath.    Cardiovascular: Negative for chest pain.   Gastrointestinal: Negative for abdominal pain.   Neurological: Positive for weakness.       Objective:     Vital Signs  Temp:  [96.6 °F (35.9 °C)-99.6 °F (37.6 °C)] 98 °F (36.7 °C)  Heart Rate:  [62-97] 97  Resp:  [16-25] 20  BP: ()/(50-73) 155/68  Flow (L/min):  [3] 3  FiO2 (%):  [30 %] 30 %   Body mass index is 49.6 kg/m².    Physical Exam  Physical Exam  Constitutional:       General: She is not in acute distress.     Interventions: She is intubated.   HENT:      Head: Normocephalic and atraumatic.   Cardiovascular:      Rate and Rhythm: Normal rate and regular rhythm.      Pulses: Normal pulses.   Pulmonary:      Effort: Pulmonary effort is normal. She is intubated.      Breath sounds: No rhonchi or rales.   Abdominal:      Palpations: Abdomen is soft.      Tenderness: There is no abdominal tenderness.   Musculoskeletal:      Right lower leg: No edema.      Left lower leg: No edema.   Skin:     General: Skin is warm.   Neurological:      Mental Status: She is alert.      GCS: GCS eye subscore is 4. GCS motor subscore is 6.   Psychiatric:         Mood and Affect: Mood normal.         Behavior: Behavior normal.         Scheduled Meds   bumetanide, 2 mg, Intravenous, Daily  chlorhexidine, 15 mL, Mouth/Throat, Q12H  [START ON 2022] epoetin hubert/hubert-epbx, 6,000 Units, Intravenous, Once  per day on Mon Wed Fri  gabapentin, 300 mg, Oral, Daily  Insulin Aspart, 0-7 Units, Subcutaneous, TID AC  Insulin Aspart, 8 Units, Subcutaneous, TID With Meals  Insulin Aspart, 8 Units, Subcutaneous, Once  insulin detemir, 15 Units, Subcutaneous, Q12H  isosorbide mononitrate, 30 mg, Oral, Q24H  levothyroxine sodium, 18.75 mcg, Intravenous, Daily  Linezolid, 600 mg, Intravenous, Q12H  lisinopril, 20 mg, Oral, Once per day on Sun Tue Thu Sat  meropenem, 1 g, Intravenous, Q24H  metoprolol tartrate, 25 mg, Oral, Q12H  pantoprazole, 40 mg, Intravenous, Q24H  senna-docusate sodium, 2 tablet, Oral, BID  sodium chloride, 10 mL, Intravenous, Q12H  sodium chloride, 10 mL, Intravenous, Q12H  sodium chloride, 10 mL, Intravenous, Q12H       PRN Meds   •  senna-docusate sodium **AND** polyethylene glycol **AND** bisacodyl **AND** bisacodyl  •  dextrose  •  dextrose  •  dextrose  •  glucagon (human recombinant)  •  heparin (porcine)  •  heparin (porcine)  •  hydrALAZINE  •  magnesium sulfate **OR** magnesium sulfate in D5W 1g/100mL (PREMIX) **OR** magnesium sulfate  •  metoprolol tartrate  •  Morphine **AND** naloxone  •  Pharmacy to Dose meropenem (MERREM)  •  potassium chloride **OR** potassium chloride **OR** potassium chloride  •  [DISCONTINUED] sodium chloride **AND** potassium chloride  •  [DISCONTINUED] sodium chloride **AND** potassium chloride  •  sodium chloride  •  sodium chloride      Diagnostic Data    Results from last 7 days   Lab Units 07/02/22  0334   WBC 10*3/mm3 11.53*   HEMOGLOBIN g/dL 9.4*   HEMATOCRIT % 30.1*   PLATELETS 10*3/mm3 259   GLUCOSE mg/dL 336*   CREATININE mg/dL 3.00*   BUN mg/dL 25*   SODIUM mmol/L 129*   POTASSIUM mmol/L 3.1*   AST (SGOT) U/L 7   ALT (SGPT) U/L <5   ALK PHOS U/L 163*   BILIRUBIN mg/dL 0.3   ANION GAP mmol/L 14.0       CT Abdomen Pelvis Without Contrast    Result Date: 6/30/2022  1. No evidence of abscess or diverticulitis. 2. Haziness surrounding the gallbladder in a region of  "the scan that it exhibits motion artifact. This could relate to motion or streak artifact. However if there is concern for possible cholecystitis further evaluation could be obtained with gallbladder ultrasound or HIDA. 3. Possible cholelithiasis. 4. Cardiomegaly. 5. Small right pleural effusion. 6. Groundglass opacities, consolidative opacities, and bandlike opacities at the bilateral lung bases, which could represent pneumonia, atelectasis, or edema. 7. Degenerative change bilateral SI joints. 8. Lumbar spondylosis. 9. Degenerative disc disease. Electronically signed by:  Yoon Malloy MD  6/30/2022 4:30 PM CDT Workstation: [id number]    XR Chest 1 View    Result Date: 7/2/2022  Poor visualization of level of distal endotracheal tube on this film due to technical factors and overlapping tubing and wiring. Large cardiac silhouette with bilateral hazy airspace disease in lower lungs. Numerous overlying EKG wires obscure evaluation of the lines and tubes on this film. Electronically signed by:  Jm Mckeon MD  7/2/2022 12:05 AM CDT Workstation: [id number]    XR Abdomen KUB    Addendum Date: 7/1/2022     ADDENDUM ADDENDUM #1 Addendum report: Received question as to whether or not \"tube is in the right place\". Since tube is not entirely visualized on this study exact position of the tube is difficult to ascertain. As stated in the report follow-up is recommended Electronically signed by:  Pablo Rubio MD  7/1/2022 3:58 PM CDT Workstation: [id number]     Result Date: 7/1/2022  The entire distal feeding tube is not included on this single portable image. Tip appears to project near the gastric antrum Electronically signed by:  Pablo Rubio MD  6/30/2022 7:45 PM CDT Workstation: [id number]    XR Abdomen KUB    Result Date: 6/30/2022    Feeding tube extends into the proximal duodenum. Electronically signed by:  Nirmal Jimenez MD  6/30/2022 9:51 PM CDT Workstation: [id number]        I reviewed the patient's new clinical " results.    Assessment/Plan:     Active Hospital Problems    Diagnosis  POA   • **Altered mental status [R41.82]  Yes     Likely due to drug overdose.   - Initial ABG showed hypercapnia   - Repeat ABG today improved and stable.   - Patient intubated on mechanical ventilation.  Tidal volume 8 cc/kg, [employer] managing vent settings   - Soft restraint order placed   - UDS is negative  - Patient responding appropriately, will plan to extubate today  - Monitor labs and vitals         • Ventilator associated pneumonia (HCC) [J95.851]  Yes     Priority: High     CXR on 6/30/22 showed evidence of pneumonia with elevated Procal and CRP  On ventilatory support since admission over the last 5 days   Discontinued Cefepime on 6/30, due to worsening clinical course and started on Meropenum 6/30 for better coverage  No fever overnight  Blood cx negative x5 days  respiratory culture positive for MRSA  On linezolid and meropenum    Repeat CXR on 7/1/22 read as,   Poor visualization of level of distal endotracheal tube on this  film due to technical factors and overlapping tubing and wiring.  Large cardiac silhouette with bilateral hazy airspace disease in  lower lungs.  Numerous overlying EKG wires obscure evaluation of the lines and  tubes on this film.     • Type 2 diabetes mellitus with autonomic neuropathy (HCC) [E11.43]  Yes     Priority: Medium     On low SSI  on Levemir 15 units q12hr  On 8u mealtime  Plan to extubate, if patient able to be extubated will plan to switch tube feeds to PO when possible   Will adjust insulin regimen appropriately      • Positive fecal occult blood test [R19.5]  Unknown     Hgb stable  GI consulted and appreciate recs     • Accidental drug overdose [T50.901A]  Yes     Family reports of 10 tablets of 10 mg of Flexeril ingested.  - Overdose labs obtained including UDS, acetaminophen, salicylate, ethanol level  - EKG showed right bundle branch block  - Poison control notified they Recommend  symptomatic care  - Monitor Qtc interval changes, improved  - Continues to be sinus tachy when performing SBTs        • On mechanically assisted ventilation ([facility]) [Z99.11]  Not Applicable     Vent management and sedation orders placed.   - [facility] intensivist group consulted for vent management appreciate recommendations   - plan to extubate today        • Acute respiratory failure with hypoxia and hypercapnia ([facility]) [J96.01, J96.02]  Yes   • Anemia [D64.9]  Yes     Hgb 6.9 on admission   - received 2u PRBC  - Type and screen   - Monitor H &H   -- H&H stable      • ESRD on hemodialysis ([facility]) [N18.6, Z99.2]  Not Applicable     -Receives hemodialysis MWF at [facility] in [place]  Missed dialysis per family prior to arrival   Nephrology consulted appreciated recs          • Acute cystitis with hematuria [N30.01]  Unknown     Fever of 100.6 on 6/30  UA showed 3+ blood, 3+ leuk esterase, Toshia +4  On meropenum  On linezolid     • Hypothyroidism [E03.9]  Yes     -home synthroid 25 mcg  -will start IV synthroid and give PO once extubated and tolerating PO intake     • Hypertension [I10]  Yes     On home metoprolol and imdur  Lisinopril on non-HD days  BP stable  - PRN Hydralazine       • Coronary artery disease involving native coronary artery of native heart without angina pectoris [I25.10]  Yes     - S/P CABG, Bilateral carotid artery stenosis w/ 2 stents on left side,  PAD (peripheral artery disease) with stent in right upper leg  - Cardiology on board       • COPD (chronic obstructive pulmonary disease) ([facility]) [J44.9]  Yes     - will start home inhalers and trelegy at night once extubated         DVT prophylaxis: SCDs/TEDs  Code status is   Code Status and Medical Interventions:   Ordered at: 06/25/22 9330     Level Of Support Discussed With:    Patient     Code Status (Patient has no pulse and is not breathing):    CPR (Attempt to Resuscitate)     Medical Interventions (Patient has pulse or is breathing):     Full Support       Plan for disposition:Where: home health and SNF and When:  3-5 daysdays      Time: 30 mins       This document has been electronically signed by Perez Hooker MD on July 2, 2022 07:52 CDT

## 2022-07-02 NOTE — NURSING NOTE
Pt adamantly refuses any time of suctioning with tonny, will not allow anyone to suction her nor will she do it herself. This nurse and respiratory have explained in detail the importance of clearing secretions, maintaining airway, possibility of reintuation. Pt still refuses any type of suction and becomes aggressive/agitated if attempted

## 2022-07-02 NOTE — THERAPY EVALUATION
Patient Name: Yamileth Slater  : 1960    MRN: [id number]                              Today's Date: 2022     PT Evaluation  Admit Date: 2022    Visit Dx:     ICD-10-CM ICD-9-CM   1. Altered mental status, unspecified altered mental status type  R41.82 780.97   2. Acute respiratory failure, unspecified whether with hypoxia or hypercapnia ([facility])  J96.00 518.81   3. Acute pulmonary edema ([facility])  J81.0 518.4   4. ESRD (end stage renal disease) ([facility])  N18.6 585.6   5. Hyperglycemia  R73.9 790.29   6. Pharyngeal dysphagia  R13.13 787.23   7. Impaired functional mobility, balance, gait, and endurance  Z74.09 V49.89     Patient Active Problem List   Diagnosis   • Chronic midline low back pain without sciatica   • Vitamin D deficiency   • Tobacco dependence syndrome   • Shoulder joint pain   • Peripheral vascular disease ([facility])   • Morbid obesity with BMI of 50.0-59.9, adult ([facility])   • Mixed hyperlipidemia   • Kidney stone   • History of colon polyps   • GERD (gastroesophageal reflux disease)   • Excoriated eczema   • Hypertension   • COPD (chronic obstructive pulmonary disease) ([facility])   • Chronic folliculitis   • Coronary artery disease involving native coronary artery of native heart without angina pectoris   • 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   • Hypothyroidism   • Carotid artery disease ([facility])   • [place] abuse   • PAD (peripheral artery disease) ([facility])   • Venous insufficiency   • LAFB (left anterior fascicular block)   • Pulmonary hypertension ([facility])   • Left hip pain   • Neck pain   • MIKE and COPD overlap syndrome ([facility])   • Pneumonia due to COVID-19 virus   • Hyperkalemia   • Acute cystitis with hematuria   • Cutaneous candidiasis   • Community acquired pneumonia of right middle lobe of lung   • MRSA infection   • Esophageal dysphagia   • Monilial esophagitis ([facility])   • NSTEMI, initial episode of care ([facility])   • Cellulitis of left leg   • Urinary tract infection due to Proteus   •  History of insertion of tunneled central venous catheter (CVC) with port   • Anemia due to chronic kidney disease, on chronic dialysis ([facility])   • Pneumonitis   • Personal history of noncompliance with medical treatment, presenting hazards to health   • Type 2 diabetes mellitus with autonomic neuropathy ([facility])   • Physical deconditioning   • ESRD on hemodialysis ([facility])   • DVT prophylaxis   • Altered mental status   • Accidental drug overdose   • On mechanically assisted ventilation ([facility])   • Acute respiratory failure with hypoxia and hypercapnia ([facility])   • Anemia   • Ventilator associated pneumonia ([facility])   • Positive fecal occult blood test     Past Medical History:   Diagnosis Date   • Acute blood loss anemia 4/16/2017    Likely due to gastric oozing at this time. - Dr. Duarte (GI) was consulted and has now signed off, will follow up outpatient - pill colonoscopy showed AVMs - continue to monitor   • Acute cystitis with hematuria 3/31/2021    1/13: IV Rocephin 1 gm q 24 1/14 : transitioned  to omnicef 300mg. Urine cultures resulted and did not show growth. Omnicef discontinued as patient is asymptomatic   • Altered mental status 1/9/2022    - AMS on presentation - initial ABG pH 7.3, CO2 34 - Procal 0.29 - UA negative for acute cysitits -CTA head wnl  - Empiric Zosyn and Vancomycin -Lactate 2.5 on admission  - blood cultures no growth at 24 hours     • Anxiety    • CAD (coronary artery disease) 4/24/2021    S/P 3 stents 5/1/2021 for [facility] Continue ASA 81mg & Clopidogrel 75mg Continue Atorvastatin 40mg   • Carotid artery stenosis    • Chronic obstructive lung disease ([facility])    • CKD (chronic kidney disease) stage 4, GFR 15-29 ml/min ([facility])    • CKD (chronic kidney disease), symptom management only, stage 5 ([facility]) 10/5/2020    Results from last 7 days Lab Units 12/15/21 0548 12/14/21 1323 12/14/21 0916 CREATININE mg/dL 3.92* 3.21* 3.32*  Baseline creatinine 2-3 GFR 13-25 GFR 15 Dialysis MWF, sees Dr. Lauren Nephrology  consult,, appreciate recommendations Continue Bumex 1mg bid daily Holding Bumex 2mg 4 times a week   • Colonic polyp    • Coronary arteriosclerosis    • Diabetes mellitus (HCC)    • Diabetic neuropathy ([facility])    • Ear pain, right 10/18/2021    - canal trauma due to patient scratching and DMT2 - added cortisporin ear drops   • Elevated troponin 10/12/2021    -most likely from CKD -Trending down -Neg chest pain   • Generalized abdominal pain 7/1/2022    Could be due to initiation of tube feeds vs dyspepsia vs abdominal cramps related to no PO intake due to intubation vs constipation Continue current laxative regiment  If no bowel movement by this afternoon will consider enema   • GERD (gastroesophageal reflux disease)    • GI bleed 5/13/2021    - GI will follow up outpatient - Protonix 40mg daily - Avoid medical DVT prophy and use mechanical at this time instead. - Continue to monitor - pill colonoscopy results showed AVMs   • History of transfusion    • Hypercholesterolemia    • Hyperosmolar hyperglycemic state (HHS) ([facility]) 6/25/2022    Serum glucose 605 on admission  Anion gap 16 PH 7.37 Bicarb 27.9 Continue fluids  Insulin drip with HHS protocol  Anion gap closed around 10 AM, received one dose of Levamir subq, will stop insulin drip after 2 hrs     • Hypertension    • Hypokalemia 5/27/2022    Will replace as needed. Will be cautious in the setting of ESRD to avoid need for emergency dialysis   Monitor Qtc intervals on EKG     • Hypomagnesemia 6/27/2021    Monitor and replace   • Morbid obesity ([facility])    • Nephrolithiasis    • Peripheral vascular disease ([facility])    • Pleural effusion on right 6/26/2022    CXR on 6/30/22 read as a small upper left pulmonary edema vs early pneumonia.  Last Echo 1/2022 EF 61-65 % Continue to monitor  Procal slightly improved, CRP improved On Linezolid and meropenum      • SIRS (systemic inflammatory response syndrome) ([facility]) 1/9/2022    Admission  - WBC 17.78   -   - RR 16 - 1/10:  VSS/wnl - CXR - Mild pulm edema - Blood cultures no growth at 24 hours  - Procalcitonin 0.29 - UA : glucose 1000, negative Leucocytes/nitrate - Empiric Zosyn and Vancomcyin    • Sleep apnea    • Substance abuse (HCC)    • Vitamin D deficiency      Past Surgical History:   Procedure Laterality Date   • CARDIAC CATHETERIZATION N/A 7/14/2020   • CARDIAC CATHETERIZATION N/A 4/23/2021    Procedure: Left Heart Cath;  Surgeon: Melba Romo MD;  Location: [facility] CATH INVASIVE LOCATION;  Service: Cardiology;  Laterality: N/A;   • CARDIAC CATHETERIZATION N/A 4/30/2021    Procedure: Percutaneous Coronary Intervention;  Surgeon: Russell Voss MD;  Location: [facility] CATH INVASIVE LOCATION;  Service: Cardiovascular;  Laterality: N/A;   • CARDIAC CATHETERIZATION N/A 4/30/2021    Procedure: Stent NIKKI coronary;  Surgeon: Russell Voss MD;  Location: [facility] CATH INVASIVE LOCATION;  Service: Cardiovascular;  Laterality: N/A;   • CARDIAC CATHETERIZATION Left 11/13/2021    Procedure: Left Heart Cath;  Surgeon: Niall Rios MD;  Location: [facility] CATH INVASIVE LOCATION;  Service: Cardiology;  Laterality: Left;   • CAROTID STENT Left    • COLONOSCOPY     • COLONOSCOPY N/A 5/14/2021    Procedure: COLONOSCOPY;  Surgeon: Mingo Duarte MD;  Location: [facility] ENDOSCOPY;  Service: Gastroenterology;  Laterality: N/A;   • CORONARY ARTERY BYPASS GRAFT N/A 2013    CABG X 3   • CYSTOSCOPY BLADDER STONE LITHOTRIPSY Bilateral    • ENDOSCOPY N/A 4/12/2021    Procedure: ESOPHAGOGASTRODUODENOSCOPY;  Surgeon: Mingo Duarte MD;  Location: [facility] ENDOSCOPY;  Service: Gastroenterology;  Laterality: N/A;   • ENDOSCOPY N/A 5/14/2021    Procedure: ESOPHAGOGASTRODUODENOSCOPY;  Surgeon: Mingo Duarte MD;  Location: [facility] ENDOSCOPY;  Service: Gastroenterology;  Laterality: N/A;   • ENDOSCOPY N/A 1/28/2022    Procedure: ESOPHAGOGASTRODUODENOSCOPY;  Surgeon: Minog Duarte MD;  Location: [facility] ENDOSCOPY;  Service:  Gastroenterology;  Laterality: N/A;   • INTERVENTIONAL RADIOLOGY PROCEDURE N/A 10/21/2021    Procedure: tunneled central venous catheter placement;  Surgeon: Donnie Robles MD;  Location: [facility] ANGIO INVASIVE LOCATION;  Service: Interventional Radiology;  Laterality: N/A;   • INTERVENTIONAL RADIOLOGY PROCEDURE N/A 2022    Procedure: tunneled central venous catheter placement;  Surgeon: Donnie Robles MD;  Location: [facility] INVASIVE LOCATION;  Service: Interventional Radiology;  Laterality: N/A;      General Information     Row Name 22 1547          Physical Therapy Time and Intention    Document Type evaluation  -GB     Mode of Treatment individual therapy;physical therapy  -GB     Row Name 22 1619 22 154       General Information    Patient Profile Reviewed -- yes  -GB    Prior Level of Function independent:;all household mobility;bed mobility;gait;min assist:;ADL's;bathing;dressing  -GB --    Existing Precautions/Restrictions -- fall  -GB    Row Name 22 1547          Living Environment    People in Home spouse  -GB     Row Name 22 1547          Home Main Entrance    Number of Stairs, Main Entrance none  -GB     Row Name 22 1547          Cognition    Orientation Status (Cognition) oriented to;person    -GB     Row Name 22 1547          Safety Issues, Functional Mobility    Impairments Affecting Function (Mobility) endurance/activity tolerance  -GB     Comment, Safety Issues/Impairments (Mobility) has 02 at home using at 3L/min; uses RW and has w/chair;  -GB           User Key  (r) = Recorded By, (t) = Taken By, (c) = Cosigned By    Initials Name Provider Type    Gauri Kohli, PT Physical Therapist               Mobility     Row Name 22 1612          Bed Mobility    Bed Mobility bed mobility (all) activities;supine-sit-supine;scooting/bridging  -GB     All Activities, [place] (Bed Mobility) moderate assist (50%  patient effort);maximum assist (25% patient effort);2 person assist  -GB     Scooting/Bridging [place] (Bed Mobility) 2 person assist;dependent (less than 25% patient effort)  w trendelenberg briefly to assist  -     Supine-Sit-Supine Kite (Bed Mobility) maximum assist (25% patient effort);2 person assist  -GB     Comment, (Bed Mobility) unable to sit unsupported w/out support of draw sheet w RN and i tho had some sitting balance that she was able to maintain, just weak; Did not lopez EOB more than 10 min but denied dizziness;  PT did not feel safe enough to try standing this visit  -     Row Name 07/02/22 1612          Bed-Chair Transfer    Bed-Chair Kite (Transfers) unable to assess;not tested  -     Row Name 07/02/22 1612          Sit-Stand Transfer    Sit-Stand Kite (Transfers) unable to assess;not tested  -     Row Name 07/02/22 1612          Gait/Stairs (Locomotion)    [place] Level (Gait) not tested;unable to assess  -     Kite Level (Stairs) not tested  -           User Key  (r) = Recorded By, (t) = Taken By, (c) = Cosigned By    Initials Name Provider Type    Gauri Kohli, PT Physical Therapist               Obj/Interventions     Row Name 07/02/22 1601          Range of Motion Comprehensive    General Range of Motion bilateral lower extremity ROM WFL  -     Row Name 07/02/22 1601          Strength Comprehensive (MMT)    Comment, General Manual Muscle Testing (MMT) Assessment 4-/5 hip knee ankle flx/ext yaz; lethargic w/ slow response/limited exertion  -     Row Name 07/02/22 1601          Sensory Assessment (Somatosensory)    Sensory Assessment (Somatosensory) LE sensation intact  yaz feet insensate  -           User Key  (r) = Recorded By, (t) = Taken By, (c) = Cosigned By    Initials Name Provider Type    Gauri Kohli PT Physical Therapist               Goals/Plan     Row Name 07/02/22 1618          Bed Mobility  Goal 1 (PT)    Activity/Assistive Device (Bed Mobility Goal 1, PT) bed mobility activities, all  -GB     [place] Level/Cues Needed (Bed Mobility Goal 1, PT) modified independence  -GB     Time Frame (Bed Mobility Goal 1, PT) 10 days  -GB     Progress/Outcomes (Bed Mobility Goal 1, PT) goal not met  -GB     Row Name 07/02/22 1618          Transfer Goal 1 (PT)    Activity/Assistive Device (Transfer Goal 1, PT) bed-to-chair/chair-to-bed;toilet  -GB     [place] Level/Cues Needed (Transfer Goal 1, PT) modified independence  -GB     Time Frame (Transfer Goal 1, PT) 2 weeks;3 weeks  -GB     Progress/Outcome (Transfer Goal 1, PT) goal not met  -GB     Row Name 07/02/22 1618          Gait Training Goal 1 (PT)    Activity/Assistive Device (Gait Training Goal 1, PT) gait (walking locomotion);assistive device use  -GB     [place] Level (Gait Training Goal 1, PT) modified independence  -GB     Distance (Gait Training Goal 1, PT) 70 ft per trip or more  -GB     Time Frame (Gait Training Goal 1, PT) 3 weeks  -GB     Progress/Outcome (Gait Training Goal 1, PT) goal not met  -GB     Row Name 07/02/22 1618          Therapy Assessment/Plan (PT)    Planned Therapy Interventions (PT) balance training;neuromuscular re-education;transfer training;bed mobility training;gait training;patient/family education;home exercise program;ROM (range of motion);strengthening;motor coordination training;wheelchair management/propulsion training  -GB           User Key  (r) = Recorded By, (t) = Taken By, (c) = Cosigned By    Initials Name Provider Type    Gauri Kohli, PT Physical Therapist               Clinical Impression     Row Name 07/02/22 7764          Pain    Pretreatment Pain Rating 0/10 - no pain  -GB     Posttreatment Pain Rating 0/10 - no pain  -GB     Pain Intervention(s) Repositioned  -GB     Row Name 07/02/22 3476          Plan of Care Review    Plan of Care Reviewed With patient  -GB     Outcome Evaluation  PT eval completed. Pt lethargic but gradually more interactive w/ eval questions and mobility; she required max assist of 2 w/ HOB up & drawsheet to reach sitting EOB and needed support to maintain upright tho denied dizziness and reported feeling better after upright; She lopez upright and mobility ~10 mi before return to supine; She reports being indep mobility at home for gait but assist for bathing.  PT to follow to progress activity skill and lopez; may need rehab to home before home pending progress and pt/family report of what they can handle at home; has had very supportive assist at home.  -GB     Row Name 07/02/22 1551          Therapy Assessment/Plan (PT)    Rehab Potential (PT) fair, will monitor progress closely  -GB     Criteria for Skilled Interventions Met (PT) yes  -GB     Therapy Frequency (PT) other (see comments)  5-7 d/w  -GB     Row Name 07/02/22 1551          Vital Signs    Pre Systolic BP Rehab 127  -GB     Pre Treatment Diastolic BP 61  -GB     Intra Systolic BP Rehab 114  -GB     Intra Treatment Diastolic BP 56  -GB     Post Systolic BP Rehab 118  -GB     Post Treatment Diastolic BP 56  -GB     Pretreatment Heart Rate (beats/min) 85  -GB     Posttreatment Heart Rate (beats/min) 77  -GB     Pre SpO2 (%) 97  -GB     O2 Delivery Pre Treatment nasal cannula  3L/min  -GB     Post SpO2 (%) 98  -GB     O2 Delivery Post Treatment nasal cannula  -GB     Pre Patient Position Supine  -GB     Intra Patient Position Sitting  -GB     Post Patient Position Supine  -GB     Row Name 07/02/22 1551          Positioning and Restraints    Pre-Treatment Position in bed  -GB     Post Treatment Position bed  -GB     In Bed notified nsg;patient within staff view;call light within reach;encouraged to call for assist;side rails up x3;side rails up x1;heels elevated  -GB           User Key  (r) = Recorded By, (t) = Taken By, (c) = Cosigned By    Initials Name Provider Type    Gauri Kohli, PT Physical  Therapist               Outcome Measures     Row Name 07/02/22 1622          How much help from another person do you currently need...    Turning from your back to your side while in flat bed without using bedrails? 2  -GB     Moving from lying on back to sitting on the side of a flat bed without bedrails? 1  -GB     Moving to and from a bed to a chair (including a wheelchair)? 1  -GB     Standing up from a chair using your arms (e.g., wheelchair, bedside chair)? 1  -GB     Climbing 3-5 steps with a railing? 1  -GB     To walk in hospital room? 1  -GB     AM-PAC 6 Clicks Score (PT) 7  -GB     Highest level of mobility 2 --> Bed activities/dependent transfer  -GB     Row Name 07/02/22 1622          Functional Assessment    Outcome Measure Options AM-PAC 6 Clicks Basic Mobility (PT)  -           User Key  (r) = Recorded By, (t) = Taken By, (c) = Cosigned By    Initials Name Provider Type    Gauri Kohli PT Physical Therapist                             Physical Therapy Education                 Title: PT OT SLP Therapies (In Progress)     Topic: Physical Therapy (In Progress)     Point: Mobility training (In Progress)     Learning Progress Summary           Patient Acceptance, E, NR by  at 7/2/2022 1623    Comment: POC                   Point: Home exercise program (In Progress)     Learning Progress Summary           Patient Acceptance, E, NR by  at 7/2/2022 1623    Comment: POC                   Point: Body mechanics (In Progress)     Learning Progress Summary           Patient Acceptance, E, NR by  at 7/2/2022 1623    Comment: POC                   Point: Precautions (In Progress)     Learning Progress Summary           Patient Acceptance, E, NR by  at 7/2/2022 1623    Comment: POC                               User Key     Initials Effective Dates Name Provider Type Discipline     06/16/21 -  Gauri Anderson PT Physical Therapist PT              PT Recommendation and  Plan  Planned Therapy Interventions (PT): balance training, neuromuscular re-education, transfer training, bed mobility training, gait training, patient/family education, home exercise program, ROM (range of motion), strengthening, motor coordination training, wheelchair management/propulsion training  Plan of Care Reviewed With: patient  Outcome Evaluation: PT eval completed. Pt lethargic but gradually more interactive w/ eval questions and mobility; she required max assist of 2 w/ HOB up & drawsheet to reach sitting EOB and needed support to maintain upright tho denied dizziness and reported feeling better after upright; She lopez upright and mobility ~10 mi before return to supine; She reports being indep mobility at home for gait but assist for bathing.  PT to follow to progress activity skill and lopez; may need rehab to home before home pending progress and pt/family report of what they can handle at home; has had very supportive assist at home.     Time Calculation:    PT Charges     Row Name 07/02/22 1547             Time Calculation    Start Time 1547  -GB      Stop Time 1627  -GB      Time Calculation (min) 40 min  -GB      PT Received On 07/02/22  -GB      PT Goal Re-Cert Due Date 07/15/22  -GB            User Key  (r) = Recorded By, (t) = Taken By, (c) = Cosigned By    Initials Name Provider Type    Gauri Kohli, PT Physical Therapist              Therapy Charges for Today     Code Description Service Date Service Provider Modifiers Qty    41144451248 HC PT EVAL MOD COMPLEXITY 3 7/2/2022 Gauri Anderson, PT GP 1    95456614031 HC PT THER SUPP EA 15 MIN 7/2/2022 Gauri Anderson, PT GP 1          PT G-Codes  Outcome Measure Options: AM-PAC 6 Clicks Basic Mobility (PT)  AM-PAC 6 Clicks Score (PT): 7    Gauri Anderson PT  7/2/2022

## 2022-07-02 NOTE — PLAN OF CARE
Goal Outcome Evaluation:  Plan of Care Reviewed With: patient           Pt extubated and transitioned to NC 3L, more responsive and communicating, cortrak with feeding still in place since pt failed swallow eval

## 2022-07-02 NOTE — PLAN OF CARE
Problem: Device-Related Complication Risk (Mechanical Ventilation, Invasive)  Goal: Optimal Device Function  Intervention: Optimize Device Care and Function  Recent Flowsheet Documentation  Taken 7/1/2022 1854 by Isamar Hurst, RRT  Airway Safety Measures:   manual resuscitator/mask at bedside   oxygen flowmeter at bedside   suction at bedside     Problem: Inability to Wean (Mechanical Ventilation, Invasive)  Goal: Mechanical Ventilation Liberation  Outcome: Ongoing, Progressing   Goal Outcome Evaluation:               Pt tolerating current settings, will attempt SBT this AM.

## 2022-07-02 NOTE — THERAPY EVALUATION
CCU/SDU - Speech Language Pathology   Swallow Initial Evaluation [facility]     Patient Name: Yamileth Slater  : 1960  MRN: [id number]  Today's Date: 2022               Admit Date: 2022     Pt seen for skilled ST services this date to assess swallow function s/p extubation.  Pt was repositioned to upright in bed and SLP provided full feeding assist 2/2 weakness.  Pt consumed ice chips w/immediate cough after all trials.  Pt required suctioning; however, did not allow SLP to suction.  Pt was thoroughly educated on need and purpose of suctioning; however, pt continued to decline suction use.  SLP recommends continued NPO at this time.  Pt was educated on results and recommendations and pt nodded in agreement.  Nsg made aware of results and recommendations.    Visit Dx:     ICD-10-CM ICD-9-CM   1. Altered mental status, unspecified altered mental status type  R41.82 780.97   2. Acute respiratory failure, unspecified whether with hypoxia or hypercapnia ([facility])  J96.00 518.81   3. Acute pulmonary edema ([facility])  J81.0 518.4   4. ESRD (end stage renal disease) ([facility])  N18.6 585.6   5. Hyperglycemia  R73.9 790.29   6. Pharyngeal dysphagia  R13.13 787.23     Patient Active Problem List   Diagnosis   • Chronic midline low back pain without sciatica   • Vitamin D deficiency   • Tobacco dependence syndrome   • Shoulder joint pain   • Peripheral vascular disease ([facility])   • Morbid obesity with BMI of 50.0-59.9, adult ([facility])   • Mixed hyperlipidemia   • Kidney stone   • History of colon polyps   • GERD (gastroesophageal reflux disease)   • Excoriated eczema   • Hypertension   • COPD (chronic obstructive pulmonary disease) ([facility])   • Chronic folliculitis   • Coronary artery disease involving native coronary artery of native heart without angina pectoris   • 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   • Hypothyroidism   • Carotid artery disease ([facility])   • Marihuana abuse   • PAD (peripheral artery  disease) ([facility])   • Venous insufficiency   • LAFB (left anterior fascicular block)   • Pulmonary hypertension ([facility])   • Left hip pain   • Neck pain   • MIKE and COPD overlap syndrome ([facility])   • Pneumonia due to COVID-19 virus   • Hyperkalemia   • Acute cystitis with hematuria   • Cutaneous candidiasis   • Community acquired pneumonia of right middle lobe of lung   • MRSA infection   • Esophageal dysphagia   • Monilial esophagitis ([facility])   • NSTEMI, initial episode of care ([facility])   • Cellulitis of left leg   • Urinary tract infection due to Proteus   • History of insertion of tunneled central venous catheter (CVC) with port   • Anemia due to chronic kidney disease, on chronic dialysis ([facility])   • Pneumonitis   • Personal history of noncompliance with medical treatment, presenting hazards to health   • Type 2 diabetes mellitus with autonomic neuropathy ([facility])   • Physical deconditioning   • ESRD on hemodialysis ([facility])   • DVT prophylaxis   • Altered mental status   • Accidental drug overdose   • On mechanically assisted ventilation ([facility])   • Acute respiratory failure with hypoxia and hypercapnia ([facility])   • Anemia   • Ventilator associated pneumonia ([facility])   • Positive fecal occult blood test     Past Medical History:   Diagnosis Date   • Acute blood loss anemia 4/16/2017    Likely due to gastric oozing at this time. - Dr. Duarte (GI) was consulted and has now signed off, will follow up outpatient - pill colonoscopy showed AVMs - continue to monitor   • Acute cystitis with hematuria 3/31/2021    1/13: IV Rocephin 1 gm q 24 1/14 : transitioned  to omnicef 300mg. Urine cultures resulted and did not show growth. Omnicef discontinued as patient is asymptomatic   • Altered mental status 1/9/2022    - AMS on presentation - initial ABG pH 7.3, CO2 34 - Procal 0.29 - UA negative for acute cysitits -CTA head wnl  - Empiric Zosyn and Vancomycin -Lactate 2.5 on admission  - blood cultures no growth at 24 hours     • Anxiety    • CAD  (coronary artery disease) 4/24/2021    S/P 3 stents 5/1/2021 for [facility] Continue ASA 81mg & Clopidogrel 75mg Continue Atorvastatin 40mg   • Carotid artery stenosis    • Chronic obstructive lung disease ([facility])    • CKD (chronic kidney disease) stage 4, GFR 15-29 ml/min ([facility])    • CKD (chronic kidney disease), symptom management only, stage 5 ([facility]) 10/5/2020    Results from last 7 days Lab Units 12/15/21 0548 12/14/21 1323 12/14/21 0916 CREATININE mg/dL 3.92* 3.21* 3.32*  Baseline creatinine 2-3 GFR 13-25 GFR 15 Dialysis MWF, sees Dr. Lauren Nephrology consult,, appreciate recommendations Continue Bumex 1mg bid daily Holding Bumex 2mg 4 times a week   • Colonic polyp    • Coronary arteriosclerosis    • Diabetes mellitus ([facility])    • Diabetic neuropathy ([facility])    • Ear pain, right 10/18/2021    - canal trauma due to patient scratching and DMT2 - added cortisporin ear drops   • Elevated troponin 10/12/2021    -most likely from CKD -Trending down -Neg chest pain   • Generalized abdominal pain 7/1/2022    Could be due to initiation of tube feeds vs dyspepsia vs abdominal cramps related to no PO intake due to intubation vs constipation Continue current laxative regiment  If no bowel movement by this afternoon will consider enema   • GERD (gastroesophageal reflux disease)    • GI bleed 5/13/2021    - GI will follow up outpatient - Protonix 40mg daily - Avoid medical DVT prophy and use mechanical at this time instead. - Continue to monitor - pill colonoscopy results showed AVMs   • History of transfusion    • Hypercholesterolemia    • Hyperosmolar hyperglycemic state (HHS) ([facility]) 6/25/2022    Serum glucose 605 on admission  Anion gap 16 PH 7.37 Bicarb 27.9 Continue fluids  Insulin drip with HHS protocol  Anion gap closed around 10 AM, received one dose of Levamir subq, will stop insulin drip after 2 hrs     • Hypertension    • Hypokalemia 5/27/2022    Will replace as needed. Will be cautious in the setting of ESRD to avoid need for  emergency dialysis   Monitor Qtc intervals on EKG     • Hypomagnesemia 6/27/2021    Monitor and replace   • Morbid obesity ([facility])    • Nephrolithiasis    • Peripheral vascular disease ([facility])    • Pleural effusion on right 6/26/2022    CXR on 6/30/22 read as a small upper left pulmonary edema vs early pneumonia.  Last Echo 1/2022 EF 61-65 % Continue to monitor  Procal slightly improved, CRP improved On Linezolid and meropenum      • SIRS (systemic inflammatory response syndrome) ([facility]) 1/9/2022    Admission  - WBC 17.78   -   - RR 16 - 1/10: VSS/wnl - CXR - Mild pulm edema - Blood cultures no growth at 24 hours  - Procalcitonin 0.29 - UA : glucose 1000, negative Leucocytes/nitrate - Empiric Zosyn and Vancomcyin    • Sleep apnea    • Substance abuse ([facility])    • Vitamin D deficiency      Past Surgical History:   Procedure Laterality Date   • CARDIAC CATHETERIZATION N/A 7/14/2020   • CARDIAC CATHETERIZATION N/A 4/23/2021    Procedure: Left Heart Cath;  Surgeon: Melba Romo MD;  Location: [facility] CATH INVASIVE LOCATION;  Service: Cardiology;  Laterality: N/A;   • CARDIAC CATHETERIZATION N/A 4/30/2021    Procedure: Percutaneous Coronary Intervention;  Surgeon: Russell Voss MD;  Location: [facility] CATH INVASIVE LOCATION;  Service: Cardiovascular;  Laterality: N/A;   • CARDIAC CATHETERIZATION N/A 4/30/2021    Procedure: Stent NIKKI coronary;  Surgeon: Russell Voss MD;  Location: [facility] CATH INVASIVE LOCATION;  Service: Cardiovascular;  Laterality: N/A;   • CARDIAC CATHETERIZATION Left 11/13/2021    Procedure: Left Heart Cath;  Surgeon: Niall Rios MD;  Location: [facility] CATH INVASIVE LOCATION;  Service: Cardiology;  Laterality: Left;   • CAROTID STENT Left    • COLONOSCOPY     • COLONOSCOPY N/A 5/14/2021    Procedure: COLONOSCOPY;  Surgeon: Mingo Duarte MD;  Location: [facility] ENDOSCOPY;  Service: Gastroenterology;  Laterality: N/A;   • CORONARY ARTERY BYPASS GRAFT N/A 2013    CABG X 3   •  CYSTOSCOPY BLADDER STONE LITHOTRIPSY Bilateral    • ENDOSCOPY N/A 4/12/2021    Procedure: ESOPHAGOGASTRODUODENOSCOPY;  Surgeon: Mingo Duarte MD;  Location: [facility] ENDOSCOPY;  Service: Gastroenterology;  Laterality: N/A;   • ENDOSCOPY N/A 5/14/2021    Procedure: ESOPHAGOGASTRODUODENOSCOPY;  Surgeon: Mingo Duarte MD;  Location: [facility] ENDOSCOPY;  Service: Gastroenterology;  Laterality: N/A;   • ENDOSCOPY N/A 1/28/2022    Procedure: ESOPHAGOGASTRODUODENOSCOPY;  Surgeon: Mingo Duarte MD;  Location: [facility] ENDOSCOPY;  Service: Gastroenterology;  Laterality: N/A;   • INTERVENTIONAL RADIOLOGY PROCEDURE N/A 10/21/2021    Procedure: tunneled central venous catheter placement;  Surgeon: Donnie Robles MD;  Location: [facility] ANGIO INVASIVE LOCATION;  Service: Interventional Radiology;  Laterality: N/A;   • INTERVENTIONAL RADIOLOGY PROCEDURE N/A 1/27/2022    Procedure: tunneled central venous catheter placement;  Surgeon: Donnie Robles MD;  Location: [facility] INVASIVE LOCATION;  Service: Interventional Radiology;  Laterality: N/A;       SLP Recommendation and Plan  SLP Swallowing Diagnosis: mod-severe, suspected pharyngeal dysphagia (07/02/22 1138)  SLP Diet Recommendation: NPO, nutritional supplements needed (07/02/22 1138)     SLP Rec. for Method of Medication Administration: meds via alternate route (07/02/22 1138)           Swallow Criteria for Skilled Therapeutic Interventions Met: demonstrates skilled criteria (07/02/22 1138)     Rehab Potential/Prognosis, Swallowing: good, to achieve stated therapy goals (07/02/22 1138)  Therapy Frequency (Swallow): 3 days per week, 5 days per week (07/02/22 1138)  Predicted Duration Therapy Intervention (Days): until discharge (07/02/22 1138)                                         SWALLOW EVALUATION (last 72 hours)     SLP Adult Swallow Evaluation     Row Name 07/02/22 1138                   Rehab Evaluation    Document Type evaluation  -CK         Total Evaluation Minutes, SLP 24  -CK        Subjective Information complains of;weakness;fatigue  -CK        Patient Observations alert;cooperative;agree to therapy  -CK        Patient/Family/Caregiver Comments/Observations No family present at bedside  -CK        Patient Effort good  -CK                  General Information    Patient Profile Reviewed yes  -CK        Pertinent History Of Current Problem Pt extubated this date; pt nods head to having swallowing difficulties in the past, but is unable to provide additional details  -CK        Current Method of Nutrition NPO;nasogastric feedings  -CK        Prior Level of Function-Swallowing unknown  -CK        Plans/Goals Discussed with patient;agreed upon  -CK                  Oral Motor Structure and Function    Dentition Assessment natural, present and adequate  -CK        Secretion Management requires suctioning to control secretions  -CK        Mucosal Quality dry  -CK        Gag Response WFL  -CK        Volitional Cough non-productive;weak;reduced respiratory support  -CK                  General Eating/Swallowing Observations    Respiratory Support Currently in Use nasal cannula  -CK        Eating/Swallowing Skills fed by SLP  -CK        Positioning During Eating upright 90 degree;upright in bed  -CK        Utensils Used spoon  -CK        Consistencies Trialed ice chips  -CK        Pre SpO2 (%) 98  -CK        Post SpO2 (%) 97  -CK                  Clinical Swallow Eval    Pharyngeal Phase suspected pharyngeal impairment  -CK        Clinical Swallow Evaluation Summary Pt seen for skilled ST services this date to assess swallow function s/p extubation.  Pt was repositioned to upright in bed and SLP provided full feeding assist 2/2 weakness.  Pt consumed ice chips w/immediate cough after all trials.  Pt required suctioning; however, did not allow SLP to suction.  Pt was thoroughly educated on need and purpose of suctioning; however, pt continued to decline  suction use.  SLP recommends continued NPO at this time.  Pt was educated on results and recommendations and pt nodded in agreement.  Nsg made aware of results and recommendations.  -CK                  Pharyngeal Phase Concerns    Pharyngeal Phase Concerns cough  -CK        Cough thin  -CK                  Swallowing Quality of Life Assessment    Education and counseling provided Signs of aspiration;Risks of aspiration;Oral care recommendations and rationale;Aspiration precautions  -CK                  SLP Evaluation Clinical Impression    SLP Swallowing Diagnosis mod-severe;suspected pharyngeal dysphagia  -CK        Functional Impact risk of aspiration/pneumonia  -CK        Rehab Potential/Prognosis, Swallowing good, to achieve stated therapy goals  -CK        Swallow Criteria for Skilled Therapeutic Interventions Met demonstrates skilled criteria  -CK                  SLP Treatment Clinical Impressions    Barriers to Overall Progress (SLP) Fatigue  -CK        Care Plan Review evaluation/treatment results reviewed;care plan/treatment goals reviewed;risks/benefits reviewed;patient/other agree to care plan;current/potential barriers reviewed  -CK                  Recommendations    Therapy Frequency (Swallow) 3 days per week;5 days per week  -CK        Predicted Duration Therapy Intervention (Days) until discharge  -CK        SLP Diet Recommendation NPO;nutritional supplements needed  -CK        Oral Care Recommendations Oral Care BID/PRN  -CK        SLP Rec. for Method of Medication Administration meds via alternate route  -CK                  Swallow Goals (SLP)    Swallow LTGs Patient will demonstrate progress toward functional swallow for  -CK        Swallow STGs diet tolerance goal selection (SLP)  -CK        Diet Tolerance Goal Selection (SLP) Patient will tolerate trials of  -CK                  (LTG) Patient will demonstrate progress toward functional swallow for    Diet Texture (Demonstrate progress toward  functional swallow) regular textures  -CK        Liquid viscosity (Demonstrate progress toward functional swallow) thin liquids  -CK        Time Frame (Demonstrate progress toward functional swallow) by discharge  -CK        Barriers (Demonstrate progress toward functional swallow) weakness  -CK        Progress/Outcomes (Demonstrate progress toward functional swallow) new goal  -CK                  (STG) Patient will tolerate trials of    Consistencies Trialed (Tolerate trials) thin liquids;pureed textures  -CK        Desired Outcome (Tolerate trials) without signs/symptoms of aspiration;with adequate oral prep/transit/clearance  -CK        Time Frame (Tolerate trials) 1 week  -CK        Progress/Outcomes (Tolerate trials) new goal  -CK              User Key  (r) = Recorded By, (t) = Taken By, (c) = Cosigned By    Initials Name Effective Dates    May Lutz MS CCC-SLP 06/16/21 -                 EDUCATION  The patient has been educated in the following areas:   Oral Care/Hydration NPO rationale.        SLP GOALS     Row Name 07/02/22 1138             (LTG) Patient will demonstrate progress toward functional swallow for    Diet Texture (Demonstrate progress toward functional swallow) regular textures  -CK      Liquid viscosity (Demonstrate progress toward functional swallow) thin liquids  -CK      Time Frame (Demonstrate progress toward functional swallow) by discharge  -CK      Barriers (Demonstrate progress toward functional swallow) weakness  -CK      Progress/Outcomes (Demonstrate progress toward functional swallow) new goal  -CK            User Key  (r) = Recorded By, (t) = Taken By, (c) = Cosigned By    Initials Name Provider Type    May Lutz MS CCC-SLP Speech and Language Pathologist                   Time Calculation:    Time Calculation- SLP     Row Name 07/02/22 1202             Time Calculation- SLP    SLP Start Time 1138  -CK      SLP Stop Time 1202  -CK      SLP Time Calculation  (min) 24 min  -CK      Total Timed Code Minutes- SLP 24 minute(s)  -CK      SLP Received On 07/02/22  -CK      SLP Goal Re-Cert Due Date 07/16/22  -CK              Untimed Charges    SLP Eval/Re-eval  ST Eval Oral Pharyng Swallow - 09841  -CK      [id number] Eval Oral Pharyng Swallow Minutes 24  -CK              Total Minutes    Untimed Charges Total Minutes 24  -CK       Total Minutes 24  -CK            User Key  (r) = Recorded By, (t) = Taken By, (c) = Cosigned By    Initials Name Provider Type    CK May Barillas MS CCC-SLP Speech and Language Pathologist                Therapy Charges for Today     Code Description Service Date Service Provider Modifiers Qty    [id number] HC ST EVAL ORAL PHARYNG SWALLOW 2 7/2/2022 May Barillas MS CCC-SLP GN 1               May Barillas MS CCC-SLP  7/2/2022

## 2022-07-02 NOTE — PLAN OF CARE
Goal Outcome Evaluation:  Plan of Care Reviewed With: patient           Outcome Evaluation: PT eval completed. Pt lethargic but gradually more interactive w/ eval questions and mobility; she required max assist of 2 w/ HOB up & drawsheet to reach sitting EOB and needed support to maintain upright tho denied dizziness and reported feeling better after upright; She lopez upright and mobility ~10 mi before return to supine; She reports being indep mobility at home for gait but assist for bathing.  PT to follow to progress activity skill and lopez; may need rehab to home before home pending progress and pt/family report of what they can handle at home; has had very supportive assist at home.

## 2022-07-02 NOTE — PROGRESS NOTES
"[facility]  [address] [place] [address]. [id number]  T - [phone]  F - [phone]     Progress Note          PATIENT  DEMOGRAPHICS   PATIENT NAME: Yamileth Slater                      PHYSICIAN: Ace Lauren MD  : 1960  MRN: [id number]   LOS: 7 days    Patient Care Team:  Rianna Macias MD as PCP - General (Family Medicine)  Subjective   SUBJECTIVE   Extubated, tired but awake         Objective   OBJECTIVE   Vital Signs  Temp:  [96.6 °F (35.9 °C)-99.6 °F (37.6 °C)] 97.8 °F (36.6 °C)  Heart Rate:  [62-97] 79  Resp:  [16-25] 20  BP: ()/(50-73) 102/51  FiO2 (%):  [30 %] 30 %    Flowsheet Rows    Flowsheet Row First Filed Value   Admission Height 157.5 cm (62\") Documented at 2022 1743   Admission Weight 135 kg (297 lb) Documented at 2022 1743           I/O last 3 completed shifts:  In: 2318 [I.V.:790; Other:226; NG/GT:702; IV Piggyback:600]  Out: 2472 [Urine:170; Emesis/NG output:300; Other:2000; Stool:2]    PHYSICAL EXAM    Physical Exam  Constitutional:       Appearance: She is well-developed. She is ill-appearing.   HENT:      Head: Normocephalic.   Eyes:      Pupils: Pupils are equal, round, and reactive to light.   Cardiovascular:      Rate and Rhythm: Normal rate and regular rhythm.      Heart sounds: Normal heart sounds.   Pulmonary:      Effort: Pulmonary effort is normal.      Breath sounds: Normal breath sounds.   Abdominal:      General: Bowel sounds are normal.      Palpations: Abdomen is soft.   Musculoskeletal:         General: No swelling.   Skin:     Coloration: Skin is not jaundiced.   Neurological:      Deep Tendon Reflexes: Reflexes normal.         RESULTS   Results Review:    Results from last 7 days   Lab Units 22  0334 22  0558 22  0601   SODIUM mmol/L 129* 134* 132*   POTASSIUM mmol/L 3.1* 4.1 4.3   CHLORIDE mmol/L 88* 94* 93*   CO2 mmol/L 27.0 24.0 19.0*   BUN mg/dL 25* 37* 24*   CREATININE mg/dL 3.00* 3.67* " 2.92*   CALCIUM mg/dL 9.5 9.3 9.2   BILIRUBIN mg/dL 0.3 0.3 0.4   ALK PHOS U/L 163* 144* 148*   ALT (SGPT) U/L <5 <5 <5   AST (SGOT) U/L 7 10 14   GLUCOSE mg/dL 336* 289* 260*       Estimated Creatinine Clearance: 24 mL/min (A) (by C-G formula based on SCr of 3 mg/dL (H)).    Results from last 7 days   Lab Units 07/02/22  0334 07/01/22  0558 06/28/22  0554 06/27/22 2010 06/27/22  0837 06/27/22  0408 06/26/22 2027   MAGNESIUM mg/dL 2.6* 1.9 2.2   < > 2.1 2.0 2.2   PHOSPHORUS mg/dL  --   --   --   --  4.3 4.1 3.8    < > = values in this interval not displayed.             Results from last 7 days   Lab Units 07/02/22  0334 07/01/22  0548 06/30/22  0716 06/29/22  0516 06/28/22  0554   WBC 10*3/mm3 11.53* 12.12* 11.78* 10.44 11.59*   HEMOGLOBIN g/dL 9.4* 9.5* 10.0* 8.6* 8.7*   PLATELETS 10*3/mm3 259 347 265 303 245       Results from last 7 days   Lab Units 06/25/22  1832   INR  1.11         Imaging Results (Last 24 Hours)     Procedure Component Value Units Date/Time    XR Chest 1 View [[id number]] Collected: 07/01/22 0518     Updated: 07/02/22 0007    Narrative:        PROCEDURE: XR CHEST 1 VIEW, 7/1/2022 5:18 AM CDT    CLINICAL INDICATION:    intubate, eval for new opacities, R41.82  Altered mental status, unspecified J96.00 Acute respiratory  failure, unspecified whether with hypoxia or hypercapnia J81.0  Acute pulmonary edema N18.6 End stage renal disease R73.9  Hyperglycemia, unspecified.    COMPARISON: 6/30/2022    TECHNIQUE:  AP portable radiograph of chest     FINDINGS:  Poor visualization of level endotracheal tube due to technical  factors and overlapping tubing and wiring. Numerous EKG leads  obscure evaluation of enteric tube or tubes. An enteric tube or  tubes extend to level of left upper quadrant, tip projects  outside field-of-view. Right IJ catheter tip at level of lower  SVC. Left neck catheter tip at level of confluence of  brachiocephalic vein and SVC.    Large cardiac silhouette. Hazy  "opacification in the bilateral mid  and lower lungs. No pneumothorax identified.        Impression:        Poor visualization of level of distal endotracheal tube on this  film due to technical factors and overlapping tubing and wiring.    Large cardiac silhouette with bilateral hazy airspace disease in  lower lungs.    Numerous overlying EKG wires obscure evaluation of the lines and  tubes on this film.        Electronically signed by:  Jm Mckeon MD  7/2/2022 12:05 AM  CDT Workstation: [id number]    XR Abdomen KUB [[id number]] Collected: 06/30/22 1849     Updated: 07/01/22 1600    Addenda:         ADDENDUM   ADDENDUM #1       Addendum report: Received question as to whether or not \"tube is  in the right place\". Since tube is not entirely visualized on  this study exact position of the tube is difficult to ascertain.  As stated in the report follow-up is recommended    Electronically signed by:  Pablo Rubio MD  7/1/2022 3:58 PM CDT  Workstation: [id number]    Signed: 07/01/22 1558 by Pablo Rubio MD    Narrative:      INDICATION: Cortrak placement, R41.82 Altered mental status,  unspecified J96.00 Acute respiratory failure, unspecified whether  with hypoxia or hypercapnia J81.0 Acute pulmonary edema N18.6 End  stage renal disease R73.9 Hyperglycemia, unspecified    EXAMINATION/TECHNIQUE: Portable view upper abdomen    COMPARISON: None.  ____________________________________________    FINDINGS:    Feeding tube is positioned well below the diaphragm. The images  do not include the right upper quadrant. The feeding tube crosses  the midline and appears to curve into the right upper quadrant.  It may be positioned near the gastric antrum. Follow-up is  recommended      Impression:      The entire distal feeding tube is not included on this single  portable image. Tip appears to project near the gastric antrum    Electronically signed by:  Pablo Rubio MD  6/30/2022 7:45 PM CDT  Workstation: [id number]           MEDICATIONS "    bumetanide, 2 mg, Intravenous, Daily  cefTRIAXone, 1 g, Intravenous, Q24H  chlorhexidine, 15 mL, Mouth/Throat, Q12H  [START ON 7/4/2022] epoetin hubert/hubert-epbx, 6,000 Units, Intravenous, Once per day on Mon Wed Fri  gabapentin, 300 mg, Oral, Daily  Insulin Aspart, 0-7 Units, Subcutaneous, TID AC  Insulin Aspart, 8 Units, Subcutaneous, TID With Meals  insulin detemir, 15 Units, Subcutaneous, Q12H  isosorbide mononitrate, 30 mg, Oral, Q24H  levothyroxine sodium, 18.75 mcg, Intravenous, Daily  Linezolid, 600 mg, Intravenous, Q12H  lisinopril, 20 mg, Oral, Once per day on Sun Tue Thu Sat  metoprolol tartrate, 25 mg, Oral, Q12H  nystatin, , Topical, Q12H  pantoprazole, 40 mg, Intravenous, Q24H  senna-docusate sodium, 2 tablet, Oral, BID  sodium chloride, 10 mL, Intravenous, Q12H  sodium chloride, 10 mL, Intravenous, Q12H  sodium chloride, 10 mL, Intravenous, Q12H      dexmedetomidine, 0.2-1.5 mcg/kg/hr, Last Rate: Stopped (07/01/22 7097)  Pharmacy to Dose meropenem (MERREM),   potassium chloride, 10 mEq, Last Rate: Stopped (06/26/22 0130)  potassium chloride, 10 mEq, Last Rate: Stopped (06/26/22 0212)  propofol, 5-50 mcg/kg/min, Last Rate: Stopped (07/02/22 1913)        Assessment & Plan   ASSESSMENT / PLAN      Altered mental status    Hypertension    COPD (chronic obstructive pulmonary disease) ([facility])    Coronary artery disease involving native coronary artery of native heart without angina pectoris    Hypothyroidism    Acute cystitis with hematuria    Type 2 diabetes mellitus with autonomic neuropathy (HCC)    ESRD on hemodialysis ([facility])    Accidental drug overdose    On mechanically assisted ventilation ([facility])    Acute respiratory failure with hypoxia and hypercapnia ([facility])    Anemia    Ventilator associated pneumonia ([facility])    Positive fecal occult blood test    1.ESRD dependent on hemodialysis since October 2021.  Has tunnel catheter. Patient is on dialysis MWF at [facility]. Lost weight from 297 to 271 lbs and  now on bumex daily. Appears euvolemic now. Change bumex to po non dialysis days     2. Anemia chronic kidney disease.  Patient had extensive work-up in the past by Dr. Munoz. She also has B12 deficiency.  She has history of AVM.  Patient is on Epogen 10K with HD.  She received 2 units of packed RBCs. hgb stable and improved. Lower epogen to 6000 units     3. htn - runs low with hd. Lisinopril on non dialysis day    4. Possible PNA/Chronic Resp Failure related to COPD-now extubated 7/1/22. She is MRSA screen positive.Patient is on home oxygen and trilogy when sleeping.  On meropenem and zyvox     5. Possible OD on flexeril- rec'd activated charcoal per g tube     6. DM- Glucose was 605 on arrival- management per primary team.      7.coronary artery disease     8.chronic kidney disease/mineral and bone disorder on sevelamer                 This document has been electronically signed by Ace Lauren MD on July 2, 2022 10:25 CDT

## 2022-07-02 NOTE — PROGRESS NOTES
[facility] Physician - Daily Progress Note  PERMANENT  07/02/2022 10:47    [facility] - CCU/SD - 20 - [place], [place] ([facility])    DALE FINK    Date of Service 07/02/2022 10:47    [facility] Problems & Interval Events [facility]  Reason for Admission to ICU: 61 yo F presented on 6-25 with AMS, suspected flexeril overdose.  Required intubation on arrival for airway protection. She has ESRD and is on dialysis.  VS: HR 81, BP 98/50, AF, sats 98% on 3 L NC  Neuro: Off sedation.  No deficits.  Awake, follows commands but she is very weakn.  Pulmonary: Intubated for airway protection. Pulmoanry edema on CXR.  Extubated on 7-2.  Still has secretions.  MRSA in sputum.   Encourage IS, oral suctioning.  Cardiac: HD stable. QT had been prolonged, 520s. No Vtach.  Nephro: ESRD, dialysis dependent  ID- febrile on 6-30 with elevated procal.  MRSA in sputum.  She has been on Linezolid and Meropenem.  GI: TFs  PPx: Heparin, Protonix    Physical Exam HR-80, BP-108/54, Temp-36.6(C), Resp Rate-21, O2 Sat%-98    Weight (kg): admission-135, current-123; I&O: intake-0, output-72, Urine-70, dialysis net (-2000), Total net (-2072)    Skin:   Lungs:   Cardiovascular:   Abdomen:   Extremities:   Wound(s):   Neurologic:     Labs Electrolyte Labs:                              CBC Labs:  Na- 129  Cl- 88     BUN- 25                              HGB- 9.4     --------+-----------+----------+- GLU- 336     WBC- 11.53 -+-----------+- PLT- 259  K- 3.1   CO2- 27.0  Cr- 3.00                             HCT- 30.1    Lab Date/Time: 07/02/2022 03:34                Lab Date/Time: 07/02/2022 03:34 WBC and PLT = * 1,000    ABG: paO2- 96.3, pH- 7.423, paCO2- 44.8, HCO3- 29.2, O2Sat %- 98.7, Base Excess- 4.2, FiO2- 30, PEEP- 5.0. Lab Date/Time- 07/02/2022 06:59.    Ventilation Respiratory: . Date/Time -07/02/2022 08:00.        Electronically Signed by: Cintia Montez) on 07/02/2022  10:51

## 2022-07-03 PROBLEM — R41.82 ALTERED MENTAL STATUS: Status: RESOLVED | Noted: 2022-06-25 | Resolved: 2022-07-03

## 2022-07-03 PROBLEM — Z99.11 ON MECHANICALLY ASSISTED VENTILATION: Status: RESOLVED | Noted: 2022-06-26 | Resolved: 2022-07-03

## 2022-07-03 LAB
ALBUMIN SERPL-MCNC: 3.4 G/DL (ref 3.5–5.2)
ALBUMIN/GLOB SERPL: 0.8 G/DL
ALP SERPL-CCNC: 181 U/L (ref 39–117)
ALT SERPL W P-5'-P-CCNC: <5 U/L (ref 1–33)
ANION GAP SERPL CALCULATED.3IONS-SCNC: 13 MMOL/L (ref 5–15)
AST SERPL-CCNC: 10 U/L (ref 1–32)
BACTERIA SPEC AEROBE CULT: ABNORMAL
BASOPHILS # BLD AUTO: 0.11 10*3/MM3 (ref 0–0.2)
BASOPHILS NFR BLD AUTO: 1.1 % (ref 0–1.5)
BILIRUB SERPL-MCNC: 0.2 MG/DL (ref 0–1.2)
BUN SERPL-MCNC: 42 MG/DL (ref 8–23)
BUN/CREAT SERPL: 9.7 (ref 7–25)
CALCIUM SPEC-SCNC: 9.8 MG/DL (ref 8.6–10.5)
CHLORIDE SERPL-SCNC: 90 MMOL/L (ref 98–107)
CO2 SERPL-SCNC: 25 MMOL/L (ref 22–29)
CREAT SERPL-MCNC: 4.35 MG/DL (ref 0.57–1)
DEPRECATED RDW RBC AUTO: 52.9 FL (ref 37–54)
EGFRCR SERPLBLD CKD-EPI 2021: 10.9 ML/MIN/1.73
EOSINOPHIL # BLD AUTO: 0.49 10*3/MM3 (ref 0–0.4)
EOSINOPHIL NFR BLD AUTO: 4.8 % (ref 0.3–6.2)
ERYTHROCYTE [DISTWIDTH] IN BLOOD BY AUTOMATED COUNT: 16.7 % (ref 12.3–15.4)
GLOBULIN UR ELPH-MCNC: 4.2 GM/DL
GLUCOSE BLDC GLUCOMTR-MCNC: 244 MG/DL (ref 70–130)
GLUCOSE BLDC GLUCOMTR-MCNC: 259 MG/DL (ref 70–130)
GLUCOSE BLDC GLUCOMTR-MCNC: 371 MG/DL (ref 70–130)
GLUCOSE SERPL-MCNC: 328 MG/DL (ref 65–99)
HCT VFR BLD AUTO: 31.4 % (ref 34–46.6)
HGB BLD-MCNC: 9.6 G/DL (ref 12–15.9)
IMM GRANULOCYTES # BLD AUTO: 0.28 10*3/MM3 (ref 0–0.05)
IMM GRANULOCYTES NFR BLD AUTO: 2.8 % (ref 0–0.5)
LYMPHOCYTES # BLD AUTO: 2.2 10*3/MM3 (ref 0.7–3.1)
LYMPHOCYTES NFR BLD AUTO: 21.7 % (ref 19.6–45.3)
MAGNESIUM SERPL-MCNC: 2.6 MG/DL (ref 1.6–2.4)
MCH RBC QN AUTO: 26.4 PG (ref 26.6–33)
MCHC RBC AUTO-ENTMCNC: 30.6 G/DL (ref 31.5–35.7)
MCV RBC AUTO: 86.5 FL (ref 79–97)
MONOCYTES # BLD AUTO: 0.92 10*3/MM3 (ref 0.1–0.9)
MONOCYTES NFR BLD AUTO: 9.1 % (ref 5–12)
NEUTROPHILS NFR BLD AUTO: 6.15 10*3/MM3 (ref 1.7–7)
NEUTROPHILS NFR BLD AUTO: 60.5 % (ref 42.7–76)
NRBC BLD AUTO-RTO: 0 /100 WBC (ref 0–0.2)
PLATELET # BLD AUTO: 337 10*3/MM3 (ref 140–450)
PMV BLD AUTO: 8.8 FL (ref 6–12)
POTASSIUM SERPL-SCNC: 4.1 MMOL/L (ref 3.5–5.2)
PROT SERPL-MCNC: 7.6 G/DL (ref 6–8.5)
RBC # BLD AUTO: 3.63 10*6/MM3 (ref 3.77–5.28)
SODIUM SERPL-SCNC: 128 MMOL/L (ref 136–145)
WBC NRBC COR # BLD: 10.15 10*3/MM3 (ref 3.4–10.8)

## 2022-07-03 PROCEDURE — 82962 GLUCOSE BLOOD TEST: CPT

## 2022-07-03 PROCEDURE — 63710000001 INSULIN DETEMIR PER 5 UNITS: Performed by: STUDENT IN AN ORGANIZED HEALTH CARE EDUCATION/TRAINING PROGRAM

## 2022-07-03 PROCEDURE — 83735 ASSAY OF MAGNESIUM: CPT | Performed by: INTERNAL MEDICINE

## 2022-07-03 PROCEDURE — 25010000002 LINEZOLID 600 MG/300ML SOLUTION: Performed by: STUDENT IN AN ORGANIZED HEALTH CARE EDUCATION/TRAINING PROGRAM

## 2022-07-03 PROCEDURE — 63710000001 INSULIN ASPART PER 5 UNITS: Performed by: STUDENT IN AN ORGANIZED HEALTH CARE EDUCATION/TRAINING PROGRAM

## 2022-07-03 PROCEDURE — 99232 SBSQ HOSP IP/OBS MODERATE 35: CPT | Performed by: INTERNAL MEDICINE

## 2022-07-03 PROCEDURE — 92526 ORAL FUNCTION THERAPY: CPT | Performed by: SPEECH-LANGUAGE PATHOLOGIST

## 2022-07-03 PROCEDURE — 80053 COMPREHEN METABOLIC PANEL: CPT | Performed by: STUDENT IN AN ORGANIZED HEALTH CARE EDUCATION/TRAINING PROGRAM

## 2022-07-03 PROCEDURE — 97166 OT EVAL MOD COMPLEX 45 MIN: CPT

## 2022-07-03 PROCEDURE — 99232 SBSQ HOSP IP/OBS MODERATE 35: CPT | Performed by: STUDENT IN AN ORGANIZED HEALTH CARE EDUCATION/TRAINING PROGRAM

## 2022-07-03 PROCEDURE — 25010000002 CEFTRIAXONE PER 250 MG: Performed by: STUDENT IN AN ORGANIZED HEALTH CARE EDUCATION/TRAINING PROGRAM

## 2022-07-03 PROCEDURE — 85025 COMPLETE CBC W/AUTO DIFF WBC: CPT | Performed by: STUDENT IN AN ORGANIZED HEALTH CARE EDUCATION/TRAINING PROGRAM

## 2022-07-03 RX ORDER — FLUCONAZOLE 2 MG/ML
200 INJECTION, SOLUTION INTRAVENOUS DAILY
Status: DISCONTINUED | OUTPATIENT
Start: 2022-07-03 | End: 2022-07-03

## 2022-07-03 RX ORDER — ACETAMINOPHEN 500 MG
500 TABLET ORAL EVERY 6 HOURS PRN
Status: DISCONTINUED | OUTPATIENT
Start: 2022-07-03 | End: 2022-07-07 | Stop reason: HOSPADM

## 2022-07-03 RX ADMIN — Medication 10 ML: at 20:13

## 2022-07-03 RX ADMIN — INSULIN DETEMIR 20 UNITS: 100 INJECTION, SOLUTION SUBCUTANEOUS at 20:10

## 2022-07-03 RX ADMIN — INSULIN ASPART 8 UNITS: 100 INJECTION, SOLUTION INTRAVENOUS; SUBCUTANEOUS at 08:04

## 2022-07-03 RX ADMIN — Medication 10 ML: at 08:03

## 2022-07-03 RX ADMIN — METOPROLOL TARTRATE 25 MG: 25 TABLET, FILM COATED ORAL at 08:03

## 2022-07-03 RX ADMIN — CEFTRIAXONE SODIUM 1 G: 1 INJECTION, POWDER, FOR SOLUTION INTRAMUSCULAR; INTRAVENOUS at 09:07

## 2022-07-03 RX ADMIN — INSULIN ASPART 3 UNITS: 100 INJECTION, SOLUTION INTRAVENOUS; SUBCUTANEOUS at 16:50

## 2022-07-03 RX ADMIN — ISOSORBIDE MONONITRATE 30 MG: 30 TABLET, EXTENDED RELEASE ORAL at 08:03

## 2022-07-03 RX ADMIN — INSULIN ASPART 8 UNITS: 100 INJECTION, SOLUTION INTRAVENOUS; SUBCUTANEOUS at 11:22

## 2022-07-03 RX ADMIN — INSULIN ASPART 5 UNITS: 100 INJECTION, SOLUTION INTRAVENOUS; SUBCUTANEOUS at 06:31

## 2022-07-03 RX ADMIN — DOCUSATE SODIUM 50 MG AND SENNOSIDES 8.6 MG 2 TABLET: 8.6; 5 TABLET, FILM COATED ORAL at 20:10

## 2022-07-03 RX ADMIN — LINEZOLID 600 MG: 600 INJECTION, SOLUTION INTRAVENOUS at 22:04

## 2022-07-03 RX ADMIN — LEVOTHYROXINE SODIUM ANHYDROUS 18.75 MCG: 100 INJECTION, POWDER, LYOPHILIZED, FOR SOLUTION INTRAVENOUS at 10:56

## 2022-07-03 RX ADMIN — NYSTATIN: 100000 POWDER TOPICAL at 08:09

## 2022-07-03 RX ADMIN — INSULIN ASPART 8 UNITS: 100 INJECTION, SOLUTION INTRAVENOUS; SUBCUTANEOUS at 20:10

## 2022-07-03 RX ADMIN — ACETAMINOPHEN 500 MG: 500 TABLET, FILM COATED ORAL at 10:00

## 2022-07-03 RX ADMIN — LINEZOLID 600 MG: 600 INJECTION, SOLUTION INTRAVENOUS at 09:54

## 2022-07-03 RX ADMIN — INSULIN ASPART 8 UNITS: 100 INJECTION, SOLUTION INTRAVENOUS; SUBCUTANEOUS at 17:19

## 2022-07-03 RX ADMIN — PANTOPRAZOLE SODIUM 40 MG: 40 INJECTION, POWDER, FOR SOLUTION INTRAVENOUS at 08:03

## 2022-07-03 RX ADMIN — NYSTATIN 1 APPLICATION: 100000 POWDER TOPICAL at 20:13

## 2022-07-03 RX ADMIN — LISINOPRIL 20 MG: 20 TABLET ORAL at 08:03

## 2022-07-03 RX ADMIN — METOPROLOL TARTRATE 25 MG: 25 TABLET, FILM COATED ORAL at 20:10

## 2022-07-03 RX ADMIN — CHLORHEXIDINE GLUCONATE 15 ML: 1.2 SOLUTION ORAL at 08:04

## 2022-07-03 RX ADMIN — GABAPENTIN 300 MG: 250 SUSPENSION ORAL at 08:04

## 2022-07-03 RX ADMIN — INSULIN DETEMIR 15 UNITS: 100 INJECTION, SOLUTION SUBCUTANEOUS at 08:09

## 2022-07-03 RX ADMIN — ACETAMINOPHEN 500 MG: 500 TABLET, FILM COATED ORAL at 16:13

## 2022-07-03 RX ADMIN — INSULIN ASPART 5 UNITS: 100 INJECTION, SOLUTION INTRAVENOUS; SUBCUTANEOUS at 11:22

## 2022-07-03 NOTE — PROGRESS NOTES
SUBJECTIVE:   7/2/2022  Chief Complaint:     Subjective      Patient got extubated and doing well clinically.  Feels better today.  Denied abdominal pain, nausea or vomiting.  Hemoglobin is 9.4.    History:  Past Medical History:   Diagnosis Date   • Acute blood loss anemia 4/16/2017    Likely due to gastric oozing at this time. - Dr. Duarte (GI) was consulted and has now signed off, will follow up outpatient - pill colonoscopy showed AVMs - continue to monitor   • Acute cystitis with hematuria 3/31/2021    1/13: IV Rocephin 1 gm q 24 1/14 : transitioned  to omnicef 300mg. Urine cultures resulted and did not show growth. Omnicef discontinued as patient is asymptomatic   • Altered mental status 1/9/2022    - AMS on presentation - initial ABG pH 7.3, CO2 34 - Procal 0.29 - UA negative for acute cysitits -CTA head wnl  - Empiric Zosyn and Vancomycin -Lactate 2.5 on admission  - blood cultures no growth at 24 hours     • Anxiety    • CAD (coronary artery disease) 4/24/2021    S/P 3 stents 5/1/2021 for [facility] Continue ASA 81mg & Clopidogrel 75mg Continue Atorvastatin 40mg   • Carotid artery stenosis    • Chronic obstructive lung disease (HCC)    • CKD (chronic kidney disease) stage 4, GFR 15-29 ml/min ([facility])    • CKD (chronic kidney disease), symptom management only, stage 5 (HCC) 10/5/2020    Results from last 7 days Lab Units 12/15/21 0548 12/14/21 1323 12/14/21 0916 CREATININE mg/dL 3.92* 3.21* 3.32*  Baseline creatinine 2-3 GFR 13-25 GFR 15 Dialysis MWF, sees Dr. Lauren Nephrology consult,, appreciate recommendations Continue Bumex 1mg bid daily Holding Bumex 2mg 4 times a week   • Colonic polyp    • Coronary arteriosclerosis    • Diabetes mellitus (HCC)    • Diabetic neuropathy (HCC)    • Ear pain, right 10/18/2021    - canal trauma due to patient scratching and DMT2 - added cortisporin ear drops   • Elevated troponin 10/12/2021    -most likely from CKD -Trending down -Neg chest pain   • Generalized abdominal  pain 7/1/2022    Could be due to initiation of tube feeds vs dyspepsia vs abdominal cramps related to no PO intake due to intubation vs constipation Continue current laxative regiment  If no bowel movement by this afternoon will consider enema   • GERD (gastroesophageal reflux disease)    • GI bleed 5/13/2021    - GI will follow up outpatient - Protonix 40mg daily - Avoid medical DVT prophy and use mechanical at this time instead. - Continue to monitor - pill colonoscopy results showed AVMs   • History of transfusion    • Hypercholesterolemia    • Hyperosmolar hyperglycemic state (HHS) ([facility]) 6/25/2022    Serum glucose 605 on admission  Anion gap 16 PH 7.37 Bicarb 27.9 Continue fluids  Insulin drip with [facility] protocol  Anion gap closed around 10 AM, received one dose of Levamir subq, will stop insulin drip after 2 hrs     • Hypertension    • Hypokalemia 5/27/2022    Will replace as needed. Will be cautious in the setting of ESRD to avoid need for emergency dialysis   Monitor Qtc intervals on EKG     • Hypomagnesemia 6/27/2021    Monitor and replace   • Morbid obesity ([facility])    • Nephrolithiasis    • Peripheral vascular disease ([facility])    • Pleural effusion on right 6/26/2022    CXR on 6/30/22 read as a small upper left pulmonary edema vs early pneumonia.  Last Echo 1/2022 EF 61-65 % Continue to monitor  Procal slightly improved, CRP improved On Linezolid and meropenum      • SIRS (systemic inflammatory response syndrome) ([facility]) 1/9/2022    Admission  - WBC 17.78   -   - RR 16 - 1/10: VSS/wnl - CXR - Mild pulm edema - Blood cultures no growth at 24 hours  - Procalcitonin 0.29 - UA : glucose 1000, negative Leucocytes/nitrate - Empiric Zosyn and Vancomcyin    • Sleep apnea    • Substance abuse ([facility])    • Vitamin D deficiency      Past Surgical History:   Procedure Laterality Date   • CARDIAC CATHETERIZATION N/A 7/14/2020   • CARDIAC CATHETERIZATION N/A 4/23/2021    Procedure: Left Heart Cath;  Surgeon: Melba Romo  MD William;  Location: [facility] CATH INVASIVE LOCATION;  Service: Cardiology;  Laterality: N/A;   • CARDIAC CATHETERIZATION N/A 4/30/2021    Procedure: Percutaneous Coronary Intervention;  Surgeon: Russell Voss MD;  Location: [facility] CATH INVASIVE LOCATION;  Service: Cardiovascular;  Laterality: N/A;   • CARDIAC CATHETERIZATION N/A 4/30/2021    Procedure: Stent NIKKI coronary;  Surgeon: Russell Voss MD;  Location: [facility] CATH INVASIVE LOCATION;  Service: Cardiovascular;  Laterality: N/A;   • CARDIAC CATHETERIZATION Left 11/13/2021    Procedure: Left Heart Cath;  Surgeon: Niall Rios MD;  Location: [facility] CATH INVASIVE LOCATION;  Service: Cardiology;  Laterality: Left;   • CAROTID STENT Left    • COLONOSCOPY     • COLONOSCOPY N/A 5/14/2021    Procedure: COLONOSCOPY;  Surgeon: Mingo Duarte MD;  Location: [facility] ENDOSCOPY;  Service: Gastroenterology;  Laterality: N/A;   • CORONARY ARTERY BYPASS GRAFT N/A 2013    CABG X 3   • CYSTOSCOPY BLADDER STONE LITHOTRIPSY Bilateral    • ENDOSCOPY N/A 4/12/2021    Procedure: ESOPHAGOGASTRODUODENOSCOPY;  Surgeon: Mingo Duarte MD;  Location: [facility] ENDOSCOPY;  Service: Gastroenterology;  Laterality: N/A;   • ENDOSCOPY N/A 5/14/2021    Procedure: ESOPHAGOGASTRODUODENOSCOPY;  Surgeon: Mingo Duarte MD;  Location: [facility] ENDOSCOPY;  Service: Gastroenterology;  Laterality: N/A;   • ENDOSCOPY N/A 1/28/2022    Procedure: ESOPHAGOGASTRODUODENOSCOPY;  Surgeon: Mingo Duarte MD;  Location: [facility] ENDOSCOPY;  Service: Gastroenterology;  Laterality: N/A;   • INTERVENTIONAL RADIOLOGY PROCEDURE N/A 10/21/2021    Procedure: tunneled central venous catheter placement;  Surgeon: Donnie Robles MD;  Location: [facility] ANGIO INVASIVE LOCATION;  Service: Interventional Radiology;  Laterality: N/A;   • INTERVENTIONAL RADIOLOGY PROCEDURE N/A 1/27/2022    Procedure: tunneled central venous catheter placement;  Surgeon: Donnie Robles MD;   Location: [facility] INVASIVE LOCATION;  Service: Interventional Radiology;  Laterality: N/A;     Family History   Problem Relation Age of Onset   • Heart disease Mother    • Lung cancer Mother    • Heart disease Father    • Heart attack Father    • Diabetes Father    • Heart disease Half-Sister         Dad's side   • Heart disease Brother    • No Known Problems Sister    • No Known Problems Sister    • No Known Problems Sister    • No Known Problems Sister    • No Known Problems Sister    • Pancreatic cancer Half-Sister         Dad's side   • No Known Problems Brother    • No Known Problems Brother    • Heart attack Half-Brother    • Heart attack Half-Brother    • No Known Problems Maternal Grandmother    • No Known Problems Maternal Grandfather    • No Known Problems Paternal Grandmother    • No Known Problems Paternal Grandfather      Social History     Tobacco Use   • Smoking status: Former Smoker     Packs/day: 0.25     Years: 46.00     Pack years: 11.50     Types: Cigarettes     Start date: 1999     Quit date: 2/15/2022     Years since quittin.3   • Smokeless tobacco: Never Used   • Tobacco comment: only smoking 5 a day - quit 2021   Substance Use Topics   • Alcohol use: No   • Drug use: Not Currently     Types: LSD, Marijuana, Methamphetamines     Medications Prior to Admission   Medication Sig Dispense Refill Last Dose   • acetaminophen (Tylenol 8 Hour) 650 MG 8 hr tablet Take 1 tablet by mouth Every 8 (Eight) Hours As Needed for Mild Pain . 90 tablet 0    • albuterol (PROVENTIL) (2.5 MG/3ML) 0.083% nebulizer solution Inhale the contents of 1 vial by nebulization Every 4 (Four) Hours As Needed for Wheezing. 75 mL 3    • albuterol sulfate  (90 Base) MCG/ACT inhaler Inhale 2 puffs Every 4 (Four) Hours As Needed for Wheezing. 18 g 1    • aspirin (aspirin) 81 MG EC tablet Take 1 tablet by mouth Daily. 60 tablet 5    • atorvastatin (LIPITOR) 20 MG tablet Take 1 tablet by mouth every  "night at bedtime. 90 tablet 0    • Blood Glucose Monitoring Suppl ([employer] Blood Glucose Meter) w/Device kit 1 each 3 (Three) Times a Day. 1 kit 3    • bumetanide (BUMEX) 2 MG tablet Take 1 tablet by mouth 4 (Four) Times a Week. Take on non-hemodialysis days. 16 tablet 0    • Capsaicin 0.035 % cream Apply 3 g topically 3 (Three) Times a Day As Needed (ankle pain). 42.5 g 2    • Cetirizine HCl 10 MG capsule Take 1 capsule by mouth Daily.      • clopidogrel (PLAVIX) 75 MG tablet Take 1 tablet by mouth Daily. 30 tablet 5    • Continuous Blood Gluc  (FreeStyle Jade 2 [place]) device 1 each Continuous. 1 each 0    • Continuous Blood Gluc Sensor (FreeStyle Jade 2 Sensor) misc 1 each Every 14 (Fourteen) Days. 2 each 11    • cyclobenzaprine (FLEXERIL) 5 MG tablet Take 2 tablets by mouth At Night As Needed for Muscle Spasms. 90 tablet 0    • Diclofenac Sodium (VOLTAREN) 1 % gel gel Apply 4 g topically to the appropriate area as directed 4 (Four) Times a Day As Needed (Ankle pain). 350 g 2    • DULoxetine (CYMBALTA) 20 MG capsule Take 2 capsules by mouth Daily for 90 days. 180 capsule 0    • Easy Touch Insulin Syringe 30G X 5/16\" 0.5 ML misc USE AS DIRECTED WITH LEVEMIR      • EASY TOUCH PEN NEEDLES 31G X 8 MM misc       • gabapentin (NEURONTIN) 300 MG capsule Take 1 capsule by mouth Daily. And an extra pill M/W/F - dialysis days 45 capsule 2    • glucose blood test strip Use as instructed 100 each 12    • insulin detemir (LEVEMIR) 100 UNIT/ML injection Inject 25 Units under the skin into the appropriate area as directed Every Night. 3 mL 12    • Insulin Lispro, 1 Unit Dial, (HUMALOG) 100 UNIT/ML solution pen-injector Inject 12 Units under the skin into the appropriate area as directed 3 (Three) Times a Day With Meals. 30 mL 12    • Insulin Pen Needle (NovoFine) 30G X 8 MM misc As directed 4 times daily 100 each 11    • Insulin Pen Needle 31G X 8 MM misc Use to inject insulin 4 (Four) Times a Day as directed. 120 each 12 "    • ipratropium-albuterol (DUO-NEB) 0.5-2.5 mg/3 ml nebulizer Take 3 mL by nebulization 4 (Four) Times a Day.      • isosorbide mononitrate (IMDUR) 30 MG 24 hr tablet Take 1 tablet by mouth Every Morning. 90 tablet 1    • levothyroxine (SYNTHROID, LEVOTHROID) 25 MCG tablet Take 25 mcg by mouth Daily.      • lisinopril (PRINIVIL,ZESTRIL) 20 MG tablet Take 1 tablet by mouth Daily. 90 tablet 0    • Methoxy PEG-Epoetin Beta (MIRCERA IJ) 150 mcg Every 14 (Fourteen) Days.      • Methoxy PEG-Epoetin Beta (MIRCERA IJ) 225 mcg Every 14 (Fourteen) Days.      • metoprolol tartrate (LOPRESSOR) 25 MG tablet Take 1 tablet by mouth Every 12 (Twelve) Hours. 180 tablet 0    • montelukast (SINGULAIR) 10 MG tablet Take 1 tablet by mouth Every Night. 90 tablet 0    • muscle rub (BenGay) 10-15 % cream cream Apply 1 application topically to the appropriate area as directed 4 (Four) Times a Day As Needed for buttock pain. 85 g 1    • nitroglycerin (NITROSTAT) 0.4 MG SL tablet Place 0.4 mg under the tongue Every 5 (Five) Minutes As Needed for Chest Pain (x 3 doses).      • O2 (OXYGEN) Inhale 3 L/min Continuous.      • ondansetron ODT (Zofran ODT) 4 MG disintegrating tablet Place 1 tablet on the tongue Every 8 (Eight) Hours As Needed for Nausea or Vomiting. 30 tablet 0    • oxybutynin XL (DITROPAN-XL) 5 MG 24 hr tablet Take 1 tablet by mouth Daily. 90 tablet 0    • pantoprazole (PROTONIX) 40 MG EC tablet Take 1 tablet by mouth Every Night. 90 tablet 0    • promethazine (PHENERGAN) 25 MG suppository Insert  into the rectum Every 6 (Six) Hours.      • ranolazine (RANEXA) 500 MG 12 hr tablet Take 1 tablet by mouth Every 12 (Twelve) Hours. 180 tablet 0    • Semaglutide (Rybelsus) 7 MG tablet Take 7 mg by mouth Daily. 90 tablet 0    • sevelamer (RENVELA) 800 MG tablet Take 800 mg by mouth 3 (Three) Times a Day With Meals.      • sodium bicarbonate 650 MG tablet Take 1 tablet by mouth.        Allergies:  Adhesive tape, Latex, Nsaids, and Other      CURRENT MEDICATIONS/OBJECTIVE/VS/PE:     Current Medications:     Current Facility-Administered Medications   Medication Dose Route Frequency Provider Last Rate Last Admin   • sennosides-docusate (PERICOLACE) 8.6-50 MG per tablet 2 tablet  2 tablet Oral BID Charlene Castro MD   2 tablet at 07/01/22 2023    And   • polyethylene glycol (MIRALAX) packet 17 g  17 g Oral Daily PRN Charlene Castro MD   17 g at 07/01/22 1249    And   • bisacodyl (DULCOLAX) EC tablet 5 mg  5 mg Oral Daily PRN Charlene Castro MD   5 mg at 06/29/22 1715    And   • bisacodyl (DULCOLAX) suppository 10 mg  10 mg Rectal Daily PRN Charlene Castro MD       • [START ON 7/5/2022] bumetanide (BUMEX) tablet 2 mg  2 mg Oral Once per day on Sun Tue Thu Sat Ace Lauren MD       • cefTRIAXone (ROCEPHIN) 1 g/100 mL 0.9% NS (MBP)  1 g Intravenous Q24H Perez Hooker MD   1 g at 07/02/22 0954   • chlorhexidine (PERIDEX) 0.12 % solution 15 mL  15 mL Mouth/Throat Q12H Charlene Castro MD   15 mL at 07/02/22 2036   • dexmedetomidine (PRECEDEX) 400 mcg in 100 mL NS infusion  0.2-1.5 mcg/kg/hr Intravenous Titrated Cintia Montez MD   Stopped at 07/01/22 2337   • dextrose (D50W) (25 g/50 mL) IV injection 10-50 mL  10-50 mL Intravenous Q15 Min PRN Charlene Castro MD       • dextrose (D50W) (25 g/50 mL) IV injection 25 g  25 g Intravenous Q15 Min PRN Jung Leonard MD       • dextrose (GLUTOSE) oral gel 15 g  15 g Oral Q15 Min PRN Jung Leonard MD       • [START ON 7/4/2022] epoetin hubert (EPOGEN,PROCRIT) injection 6,000 Units  6,000 Units Intravenous Once per day on Mon Ace Olmstead MD       • gabapentin (NEURONTIN) 50 mg/mL solution 300 mg  300 mg Oral Daily Jerman Coffman MD   300 mg at 07/02/22 0953   • glucagon (human recombinant) (GLUCAGEN DIAGNOSTIC) injection 1 mg  1 mg Intramuscular Q15 Min PRN Jung Leonard MD       • guaiFENesin (ROBITUSSIN) 100 MG/5ML oral solution 400 mg  400 mg Oral Q6H PRN Augustus  MD Jerman       • heparin (porcine) injection 4,000 Units  4,000 Units Intracatheter PRN Ace Lauren MD   4,000 Units at 07/01/22 1237   • heparin (porcine) injection 4,000 Units  4,000 Units Intracatheter PRN Ace Lauren MD       • hydrALAZINE (APRESOLINE) injection 10 mg  10 mg Intravenous Q6H PRN Jung Leonard MD   10 mg at 07/01/22 0034   • Insulin Aspart (novoLOG) injection 0-7 Units  0-7 Units Subcutaneous TID AC Jung Leonard MD   3 Units at 07/02/22 1730   • Insulin Aspart (novoLOG) injection 8 Units  8 Units Subcutaneous 4x Daily Perez Hooker MD   8 Units at 07/02/22 2038   • insulin detemir (LEVEMIR) injection 15 Units  15 Units Subcutaneous Q12H Jung Leonard MD   15 Units at 07/02/22 2039   • isosorbide mononitrate (IMDUR) 24 hr tablet 30 mg  30 mg Oral Q24H Jung Leonard MD   30 mg at 07/02/22 0802   • levothyroxine sodium injection 18.75 mcg  18.75 mcg Intravenous Daily Jerman Coffman MD   18.75 mcg at 07/02/22 1033   • Linezolid (ZYVOX) 600 mg 300 mL  600 mg Intravenous Q12H Jung Leonard  mL/hr at 07/02/22 2300 600 mg at 07/02/22 2300   • lisinopril (PRINIVIL,ZESTRIL) tablet 20 mg  20 mg Oral Once per day on Sun Tue Thu Sat Jung Leonard MD   20 mg at 07/02/22 0802   • magnesium sulfate 4 gram infusion - Mg less than or equal to 1mg/dL  4 g Intravenous PRN Charlene Castro MD        Or   • magnesium sulfate 3 gram infusion (1gm x 3) - Mg 1.1 - 1.5 mg/dL  1 g Intravenous PRN Charlene Castro MD        Or   • Magnesium Sulfate 2 gram infusion- Mg 1.6 - 1.9 mg/dL  2 g Intravenous PRN Charlene Castro MD 25 mL/hr at 07/01/22 1617 2 g at 07/01/22 1617   • metoprolol tartrate (LOPRESSOR) injection 5 mg  5 mg Intravenous Q5 Min PRN Froylan Lu MD   5 mg at 06/26/22 1921   • metoprolol tartrate (LOPRESSOR) tablet 25 mg  25 mg Oral Q12H Froylan Lu MD   25 mg at 07/02/22 2036   • naloxone (NARCAN) injection 0.4 mg  0.4 mg Intravenous Q5 Min PRN Charlene Castro,  MD       • nystatin (MYCOSTATIN) powder   Topical Q12H Perez Hooker MD   Given at 07/02/22 2039   • pantoprazole (PROTONIX) injection 40 mg  40 mg Intravenous Q24H Charlene Castro MD   40 mg at 07/02/22 0801   • potassium chloride (MICRO-K) CR capsule 40 mEq  40 mEq Oral PRN Huy Santa MD        Or   • potassium chloride (KLOR-CON) packet 40 mEq  40 mEq Oral PRN Huy Santa MD   40 mEq at 07/02/22 1210    Or   • potassium chloride 10 mEq in 100 mL IVPB  10 mEq Intravenous Q1H PRN Huy Santa MD       • potassium chloride 10 mEq in 100 mL IVPB  10 mEq Intravenous Continuous PRN Charlene Castro MD   Stopped at 06/26/22 0130   • potassium chloride 10 mEq in 100 mL IVPB  10 mEq Intravenous Continuous PRN Charlene Castro MD   Stopped at 06/26/22 0212   • propofol (DIPRIVAN) infusion 10 mg/mL 100 mL  5-50 mcg/kg/min Intravenous Titrated Huy Santa MD   Stopped at 07/02/22 0531   • scopolamine patch 1 mg/72 hr  1 patch Transdermal Q72H Jerman Coffman MD   1 patch at 07/02/22 1111   • sodium chloride 0.9 % flush 10 mL  10 mL Intravenous Q12H Jerman Coffman MD   10 mL at 07/02/22 2037   • sodium chloride 0.9 % flush 10 mL  10 mL Intravenous Q12H Jerman Coffman MD   10 mL at 07/02/22 0801   • sodium chloride 0.9 % flush 10 mL  10 mL Intravenous Q12H Jerman Coffman MD   10 mL at 07/02/22 2036   • sodium chloride 0.9 % flush 10 mL  10 mL Intravenous PRN Jerman Coffman MD       • sodium chloride 0.9 % flush 20 mL  20 mL Intravenous PRN Jerman Coffman MD           Objective     Review of Systems:   Review of Systems   Constitutional: Negative for chills, fatigue, fever and unexpected weight change.   HENT: Negative for congestion, ear discharge, hearing loss, nosebleeds and sore throat.    Eyes: Negative for pain, discharge and redness.   Respiratory: Negative for cough, chest tightness, shortness of breath and wheezing.    Cardiovascular: Negative for chest pain and palpitations.    Gastrointestinal: Negative for abdominal distention, abdominal pain, blood in stool, constipation, diarrhea, nausea and vomiting.   Endocrine: Negative for cold intolerance, polydipsia, polyphagia and polyuria.   Genitourinary: Negative for dysuria, flank pain, frequency, hematuria and urgency.   Musculoskeletal: Negative for arthralgias, back pain, joint swelling and myalgias.   Skin: Negative for color change, pallor and rash.   Neurological: Negative for tremors, seizures, syncope, weakness and headaches.   Hematological: Negative for adenopathy. Does not bruise/bleed easily.   Psychiatric/Behavioral: Negative for behavioral problems, confusion, dysphoric mood, hallucinations and suicidal ideas. The patient is not nervous/anxious.        Physical Exam:   Temp:  [97.6 °F (36.4 °C)-98.5 °F (36.9 °C)] 98.5 °F (36.9 °C)  Heart Rate:  [62-97] 74  Resp:  [19-20] 20  BP: ()/(48-68) 99/48  FiO2 (%):  [30 %] 30 %     Physical Exam:  General Appearance:    Alert, cooperative, in no acute distress   Head:    Normocephalic, without obvious abnormality, atraumatic   Eyes:            Lids and lashes normal, conjunctivae and sclerae normal, no   icterus, no pallor, corneas clear, PERRLA   Ears:    Ears appear intact with no abnormalities noted   Throat:   No oral lesions, no thrush, oral mucosa moist   Neck:   No adenopathy, supple, trachea midline, no thyromegaly, no     carotid bruit, no JVD   Back:     No kyphosis present, no scoliosis present, no skin lesions,       erythema or scars, no tenderness to percussion or                   palpation,   range of motion normal   Lungs:     Clear to auscultation,respirations regular, even and                   unlabored    Heart:    Regular rhythm and normal rate, normal S1 and S2, no            murmur, no gallop, no rub, no click   Breast Exam:    Deferred   Abdomen:     Normal bowel sounds, no masses, no organomegaly, soft        nontender, nondistended, no guarding, no  rebound                 tenderness   Genitalia:    Deferred   Extremities:   Moves all extremities well, no edema, no cyanosis, no              redness   Pulses:   Pulses palpable and equal bilaterally   Skin:   No bleeding, bruising or rash   Lymph nodes:   No palpable adenopathy   Neurologic:   Cranial nerves 2 - 12 grossly intact, sensation intact, DTR        present and equal bilaterally      Results Review:     Lab Results (last 24 hours)     Procedure Component Value Units Date/Time    POC Glucose Once [[id number]]  (Abnormal) Collected: 07/02/22 1936    Specimen: Blood Updated: 07/02/22 2006     Glucose 235 mg/dL      Comment: RN NotifiedOperator: [id number] LEONORA AMANDAMeter ID: [id number]       POC Glucose Once [[id number]]  (Abnormal) Collected: 07/02/22 1729    Specimen: Blood Updated: 07/02/22 1740     Glucose 221 mg/dL      Comment: Sliding Scale AdminOperator: [id number] PHILLIP MADISONMeter ID: [id number]       Potassium [[id number]]  (Normal) Collected: 07/02/22 1543    Specimen: Blood Updated: 07/02/22 1609     Potassium 4.4 mmol/L     Urinalysis, Microscopic Only - Urine, Clean Catch [[id number]]  (Abnormal) Collected: 07/02/22 1418    Specimen: Urine, Clean Catch Updated: 07/02/22 1514     RBC, UA None Seen /HPF      WBC, UA Too Numerous to Count /HPF      Bacteria, UA 3+ /HPF      Squamous Epithelial Cells, UA 0-2 /HPF      Hyaline Casts, UA 3-6 /LPF      Methodology Manual Light Microscopy    Urine Culture - Urine, Urine, Clean Catch [[id number]] Collected: 07/02/22 1418    Specimen: Urine, Clean Catch Updated: 07/02/22 1514    Urinalysis With Culture If Indicated - Urine, Clean Catch [[id number]]  (Abnormal) Collected: 07/02/22 1418    Specimen: Urine, Clean Catch Updated: 07/02/22 1436     Color, UA Dark Yellow     Appearance, UA Turbid     pH, UA 5.5     Specific Gravity, UA 1.029     Glucose,  mg/dL (1+)     Ketones, UA 15 mg/dL (1+)     Bilirubin, UA Small (1+)     Blood, UA Small (1+)      Protein, UA >=300 mg/dL (3+)     Leuk Esterase, UA Large (3+)     Nitrite, UA Negative     Urobilinogen, UA 1.0 E.U./dL    Narrative:      In absence of clinical symptoms, the presence of pyuria, bacteria, and/or nitrites on the urinalysis result does not correlate with infection.    Wound Culture - Wound, Leg, Left [[id number]] Collected: 06/29/22 0632    Specimen: Wound from Leg, Left Updated: 07/02/22 1236     Wound Culture No growth at 3 days     Gram Stain Few (2+) WBCs seen      No organisms seen    POC Glucose Once [[id number]]  (Abnormal) Collected: 07/02/22 1105    Specimen: Blood Updated: 07/02/22 1127     Glucose 260 mg/dL      Comment: Sliding Scale AdminOperator: [id number] PHILLIP MADISONMeter ID: [id number]       Urinalysis, Microscopic Only - Urine, Clean Catch [[id number]]  (Abnormal) Collected: 06/30/22 1607    Specimen: Urine, Clean Catch Updated: 07/02/22 1020     RBC, UA Too Numerous to Count /HPF      WBC, UA Too Numerous to Count /HPF      Bacteria, UA 4+ /HPF      Squamous Epithelial Cells, UA None Seen /HPF      Yeast, UA Large/3+ Budding Yeast /HPF      Hyaline Casts, UA 21-30 /LPF      Methodology Manual Light Microscopy    Urinalysis With Culture If Indicated - Urine, Clean Catch [[id number]]  (Abnormal) Collected: 06/30/22 1607    Specimen: Urine, Clean Catch Updated: 07/02/22 1020     Color, UA Dark Yellow     Appearance, UA Turbid     pH, UA 5.5     Specific Gravity, UA 1.020     Glucose,  mg/dL (1+)     Ketones, UA 15 mg/dL (1+)     Bilirubin, UA Negative     Blood, UA Large (3+)     Protein, UA >=300 mg/dL (3+)     Leuk Esterase, UA Large (3+)     Nitrite, UA Negative     Urobilinogen, UA 0.2 E.U./dL    Narrative:      In absence of clinical symptoms, the presence of pyuria, bacteria, and/or nitrites on the urinalysis result does not correlate with infection.    Urine Culture - Urine, Urine, Clean Catch [[id number]] Collected: 06/30/22 1607    Specimen: Urine, Clean Catch  Updated: 07/02/22 1020    Respiratory Culture - Sputum, Oropharynx [[id number]]  (Abnormal)  (Susceptibility) Collected: 06/30/22 1029    Specimen: Sputum from Oropharynx Updated: 07/02/22 0822     Respiratory Culture Scant growth (1+) Staphylococcus aureus, MRSA     Comment: Methicillin resistant Staphylococcus aureus, Patient may be an isolation risk.         No Normal Respiratory Nataly     Comment: Methicillin resistant Staphylococcus aureus, Patient may be an isolation risk.        Gram Stain Many (4+) WBCs seen      Few (2+) Epithelial cells per low power field      Rare (1+) Mixed bacterial nataly    Susceptibility      Staphylococcus aureus, MRSA      JULIO CÉSAR      Clindamycin Susceptible      Inducible Clindamycin Resistance Negative      Linezolid Susceptible      Oxacillin Resistant     Tetracycline Susceptible      Trimethoprim + Sulfamethoxazole Susceptible      Vancomycin Susceptible                    Linear View               Susceptibility Comments     Staphylococcus aureus, MRSA    This isolate does not demonstrate inducible clindamycin resistance in vitro.               Blood Culture - Blood, Arm, Right [[id number]]  (Normal) Collected: 06/30/22 0746    Specimen: Blood from Arm, Right Updated: 07/02/22 0801     Blood Culture No growth at 2 days    Blood Culture - Blood, Blood, Central Line [[id number]]  (Normal) Collected: 06/30/22 0746    Specimen: Blood, Central Line Updated: 07/02/22 0801     Blood Culture No growth at 2 days    Blood Gas, Arterial - [[id number]]  (Abnormal) Collected: 07/02/22 0659    Specimen: Arterial Blood Updated: 07/02/22 0702     Site Left Radial     Shadi's Test Positive     pH, Arterial 7.423 pH units      pCO2, Arterial 44.8 mm Hg      pO2, Arterial 96.3 mm Hg      HCO3, Arterial 29.2 mmol/L      Comment: 83 Value above reference range        Base Excess, Arterial 4.2 mmol/L      Comment: 83 Value above reference range        O2 Saturation, Arterial 98.7 %      Barometric  "Pressure for Blood Gas 747 mmHg      Modality Ventilator     FIO2 30 %      Ventilator Mode PSV     PEEP 5.0     PSV 8.0 cmH2O      Collected by gisell     Comment: Meter: [id number]     :  [id number]       Magnesium [[id number]]  (Abnormal) Collected: 07/02/22 0334    Specimen: Blood Updated: 07/02/22 0623     Magnesium 2.6 mg/dL     POC Glucose Once [[id number]]  (Abnormal) Collected: 07/02/22 0556    Specimen: Blood Updated: 07/02/22 0609     Glucose 340 mg/dL      Comment: RN NotifiedOperator: [id number] JUDD Abdullahi ID: [id number]       Procalcitonin [[id number]]  (Abnormal) Collected: 07/02/22 0334    Specimen: Blood Updated: 07/02/22 0423     Procalcitonin 2.36 ng/mL     Narrative:      As a Marker for Sepsis (Non-Neonates):    1. <0.5 ng/mL represents a low risk of severe sepsis and/or septic shock.  2. >2 ng/mL represents a high risk of severe sepsis and/or septic shock.    As a Marker for Lower Respiratory Tract Infections that require antibiotic therapy:    PCT on Admission    Antibiotic Therapy       6-12 Hrs later    >0.5                Strongly Recommended  >0.25 - <0.5        Recommended   0.1 - 0.25          Discouraged              Remeasure/reassess PCT  <0.1                Strongly Discouraged     Remeasure/reassess PCT    As 28 day mortality risk marker: \"Change in Procalcitonin Result\" (>80% or <=80%) if Day 0 (or Day 1) and Day 4 values are available. Refer to http://www.[facility]-pct-calculator.com    Change in PCT <=80%  A decrease of PCT levels below or equal to 80% defines a positive change in PCT test result representing a higher risk for 28-day all-cause mortality of patients diagnosed with severe sepsis for septic shock.    Change in PCT >80%  A decrease of PCT levels of more than 80% defines a negative change in PCT result representing a lower risk for 28-day all-cause mortality of patients diagnosed with severe sepsis or septic shock.       Comprehensive Metabolic Panel " [[id number]]  (Abnormal) Collected: 07/02/22 0334    Specimen: Blood Updated: 07/02/22 0419     Glucose 336 mg/dL      BUN 25 mg/dL      Creatinine 3.00 mg/dL      Sodium 129 mmol/L      Potassium 3.1 mmol/L      Chloride 88 mmol/L      CO2 27.0 mmol/L      Calcium 9.5 mg/dL      Total Protein 7.5 g/dL      Albumin 3.30 g/dL      ALT (SGPT) <5 U/L      AST (SGOT) 7 U/L      Alkaline Phosphatase 163 U/L      Total Bilirubin 0.3 mg/dL      Globulin 4.2 gm/dL      A/G Ratio 0.8 g/dL      BUN/Creatinine Ratio 8.3     Anion Gap 14.0 mmol/L      eGFR 17.0 mL/min/1.73      Comment: National Kid[employer] Foundation and American Society of Nephrology (ASN) Task Force recommended calculation based on the Chronic Kidney Disease Epidemiology Collaboration (CKD-EPI) equation refit without adjustment for race.       Narrative:      GFR Normal >60  Chronic Kidney Disease <60  Kidney Failure <15      C-reactive Protein [[id number]]  (Abnormal) Collected: 07/02/22 0334    Specimen: Blood Updated: 07/02/22 0419     C-Reactive Protein 17.34 mg/dL     CBC Auto Differential [[id number]]  (Abnormal) Collected: 07/02/22 0334    Specimen: Blood Updated: 07/02/22 0356     WBC 11.53 10*3/mm3      RBC 3.52 10*6/mm3      Hemoglobin 9.4 g/dL      Hematocrit 30.1 %      MCV 85.5 fL      MCH 26.7 pg      MCHC 31.2 g/dL      RDW 16.6 %      RDW-SD 50.9 fl      MPV 8.5 fL      Platelets 259 10*3/mm3      Neutrophil % 69.0 %      Lymphocyte % 17.6 %      Monocyte % 8.5 %      Eosinophil % 2.8 %      Basophil % 0.9 %      Immature Grans % 1.2 %      Neutrophils, Absolute 7.96 10*3/mm3      Lymphocytes, Absolute 2.03 10*3/mm3      Monocytes, Absolute 0.98 10*3/mm3      Eosinophils, Absolute 0.32 10*3/mm3      Basophils, Absolute 0.10 10*3/mm3      Immature Grans, Absolute 0.14 10*3/mm3      nRBC 0.0 /100 WBC            I reviewed the patient's new clinical results.  I reviewed the patient's new imaging results and agree with the interpretation.      ASSESSMENT/PLAN:   ASSESSMENT: 1.  Anemia with heme positive stool, likely due to GI blood loss.  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  Hemoglobin is stable.  2.  Pneumonia, on antibiotics  3.  History of Flexeril overdosage  4.  End-stage renal disease on hemodialysis    PLAN:  1.  Continue PPI and current supportive care  2.  Follow H&H closely and transfuse as needed  3.  Endoscopic evaluation if hemoglobin drops.  The risks, benefits, and alternatives of this procedure have been discussed with the patient or the responsible party- the patient understands and agrees to proceed.         Mingo Duarte MD  07/02/22  23:39 CDT

## 2022-07-03 NOTE — THERAPY EVALUATION
Acute Care - Occupational Therapy Initial Evaluation  [facility]     Patient Name: Yamileth Slater  : 1960  MRN: [id number]  Today's Date: 7/3/2022     Date of Referral to OT: 22       Admit Date: 2022       ICD-10-CM ICD-9-CM   1. Altered mental status, unspecified altered mental status type  R41.82 780.97   2. Acute respiratory failure, unspecified whether with hypoxia or hypercapnia ([facility])  J96.00 518.81   3. Acute pulmonary edema ([facility])  J81.0 518.4   4. ESRD (end stage renal disease) ([facility])  N18.6 585.6   5. Hyperglycemia  R73.9 790.29   6. Pharyngeal dysphagia  R13.13 787.23   7. Impaired functional mobility, balance, gait, and endurance  Z74.09 V49.89   8. Impaired mobility and activities of daily living  Z74.09 V49.89    Z78.9      Patient Active Problem List   Diagnosis   • Chronic midline low back pain without sciatica   • Vitamin D deficiency   • Tobacco dependence syndrome   • Shoulder joint pain   • Peripheral vascular disease ([facility])   • Morbid obesity with BMI of 50.0-59.9, adult ([facility])   • Mixed hyperlipidemia   • Kidney stone   • History of colon polyps   • GERD (gastroesophageal reflux disease)   • Excoriated eczema   • Hypertension   • COPD (chronic obstructive pulmonary disease) ([facility])   • Chronic folliculitis   • Coronary artery disease involving native coronary artery of native heart without angina pectoris   • 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   • Hypothyroidism   • Carotid artery disease ([facility])   • [place] abuse   • PAD (peripheral artery disease) ([facility])   • Venous insufficiency   • LAFB (left anterior fascicular block)   • Pulmonary hypertension ([facility])   • Left hip pain   • Neck pain   • MIKE and COPD overlap syndrome ([facility])   • Pneumonia due to COVID-19 virus   • Hyperkalemia   • Acute cystitis with hematuria   • Cutaneous candidiasis   • Community acquired pneumonia of right middle lobe of lung   • MRSA infection   • Esophageal dysphagia   •  Monilial esophagitis ([facility])   • NSTEMI, initial episode of care ([facility])   • Cellulitis of left leg   • Urinary tract infection due to Proteus   • History of insertion of tunneled central venous catheter (CVC) with port   • Anemia due to chronic kidney disease, on chronic dialysis ([facility])   • Pneumonitis   • Personal history of noncompliance with medical treatment, presenting hazards to health   • Type 2 diabetes mellitus with autonomic neuropathy ([facility])   • Physical deconditioning   • ESRD on hemodialysis ([facility])   • DVT prophylaxis   • Accidental drug overdose   • Acute respiratory failure with hypoxia and hypercapnia ([facility])   • Anemia   • Ventilator associated pneumonia ([facility])   • Positive fecal occult blood test     Past Medical History:   Diagnosis Date   • Acute blood loss anemia 4/16/2017    Likely due to gastric oozing at this time. - Dr. Duarte (GI) was consulted and has now signed off, will follow up outpatient - pill colonoscopy showed AVMs - continue to monitor   • Acute cystitis with hematuria 3/31/2021    1/13: IV Rocephin 1 gm q 24 1/14 : transitioned  to omnicef 300mg. Urine cultures resulted and did not show growth. Omnicef discontinued as patient is asymptomatic   • Altered mental status 1/9/2022    - AMS on presentation - initial ABG pH 7.3, CO2 34 - Procal 0.29 - UA negative for acute cysitits -CTA head wnl  - Empiric Zosyn and Vancomycin -Lactate 2.5 on admission  - blood cultures no growth at 24 hours     • Anxiety    • CAD (coronary artery disease) 4/24/2021    S/P 3 stents 5/1/2021 for [facility] Continue ASA 81mg & Clopidogrel 75mg Continue Atorvastatin 40mg   • Carotid artery stenosis    • Chronic obstructive lung disease ([facility])    • CKD (chronic kidney disease) stage 4, GFR 15-29 ml/min ([facility])    • CKD (chronic kidney disease), symptom management only, stage 5 ([facility]) 10/5/2020    Results from last 7 days Lab Units 12/15/21 0548 12/14/21 1323 12/14/21 0916 CREATININE mg/dL 3.92* 3.21* 3.32*  Baseline creatinine  2-3 GFR 13-25 GFR 15 Dialysis MWF, sees Dr. Lauren Nephrology consult,, appreciate recommendations Continue Bumex 1mg bid daily Holding Bumex 2mg 4 times a week   • Colonic polyp    • Coronary arteriosclerosis    • Diabetes mellitus (HCC)    • Diabetic neuropathy (HCC)    • Ear pain, right 10/18/2021    - canal trauma due to patient scratching and DMT2 - added cortisporin ear drops   • Elevated troponin 10/12/2021    -most likely from CKD -Trending down -Neg chest pain   • Generalized abdominal pain 7/1/2022    Could be due to initiation of tube feeds vs dyspepsia vs abdominal cramps related to no PO intake due to intubation vs constipation Continue current laxative regiment  If no bowel movement by this afternoon will consider enema   • GERD (gastroesophageal reflux disease)    • GI bleed 5/13/2021    - GI will follow up outpatient - Protonix 40mg daily - Avoid medical DVT prophy and use mechanical at this time instead. - Continue to monitor - pill colonoscopy results showed AVMs   • History of transfusion    • Hypercholesterolemia    • Hyperosmolar hyperglycemic state (HHS) (HCC) 6/25/2022    Serum glucose 605 on admission  Anion gap 16 PH 7.37 Bicarb 27.9 Continue fluids  Insulin drip with HHS protocol  Anion gap closed around 10 AM, received one dose of Levamir subq, will stop insulin drip after 2 hrs     • Hypertension    • Hypokalemia 5/27/2022    Will replace as needed. Will be cautious in the setting of ESRD to avoid need for emergency dialysis   Monitor Qtc intervals on EKG     • Hypomagnesemia 6/27/2021    Monitor and replace   • Morbid obesity (HCC)    • Nephrolithiasis    • On mechanically assisted ventilation (HCC) 6/26/2022    Vent management and sedation orders placed.  - [facility] intensivist group consulted for vent management appreciate recommendations  - plan to extubate today     • Peripheral vascular disease (HCC)    • Pleural effusion on right 6/26/2022    CXR on 6/30/22 read as a small  upper left pulmonary edema vs early pneumonia.  Last Echo 1/2022 EF 61-65 % Continue to monitor  Procal slightly improved, CRP improved On Linezolid and meropenum      • SIRS (systemic inflammatory response syndrome) ([facility]) 1/9/2022    Admission  - WBC 17.78   -   - RR 16 - 1/10: VSS/wnl - CXR - Mild pulm edema - Blood cultures no growth at 24 hours  - Procalcitonin 0.29 - UA : glucose 1000, negative Leucocytes/nitrate - Empiric Zosyn and Vancomcyin    • Sleep apnea    • Substance abuse ([facility])    • Vitamin D deficiency      Past Surgical History:   Procedure Laterality Date   • CARDIAC CATHETERIZATION N/A 7/14/2020   • CARDIAC CATHETERIZATION N/A 4/23/2021    Procedure: Left Heart Cath;  Surgeon: Melba Romo MD;  Location: [facility] CATH INVASIVE LOCATION;  Service: Cardiology;  Laterality: N/A;   • CARDIAC CATHETERIZATION N/A 4/30/2021    Procedure: Percutaneous Coronary Intervention;  Surgeon: Russell Voss MD;  Location: [facility] CATH INVASIVE LOCATION;  Service: Cardiovascular;  Laterality: N/A;   • CARDIAC CATHETERIZATION N/A 4/30/2021    Procedure: Stent NIKKI coronary;  Surgeon: Russell Voss MD;  Location: [facility] CATH INVASIVE LOCATION;  Service: Cardiovascular;  Laterality: N/A;   • CARDIAC CATHETERIZATION Left 11/13/2021    Procedure: Left Heart Cath;  Surgeon: Niall Rios MD;  Location: [facility] CATH INVASIVE LOCATION;  Service: Cardiology;  Laterality: Left;   • CAROTID STENT Left    • COLONOSCOPY     • COLONOSCOPY N/A 5/14/2021    Procedure: COLONOSCOPY;  Surgeon: Mingo Duarte MD;  Location: [facility] ENDOSCOPY;  Service: Gastroenterology;  Laterality: N/A;   • CORONARY ARTERY BYPASS GRAFT N/A 2013    CABG X 3   • CYSTOSCOPY BLADDER STONE LITHOTRIPSY Bilateral    • ENDOSCOPY N/A 4/12/2021    Procedure: ESOPHAGOGASTRODUODENOSCOPY;  Surgeon: Mingo Duarte MD;  Location: [facility] ENDOSCOPY;  Service: Gastroenterology;  Laterality: N/A;   • ENDOSCOPY N/A 5/14/2021     Procedure: ESOPHAGOGASTRODUODENOSCOPY;  Surgeon: Mingo Duarte MD;  Location: [facility] ENDOSCOPY;  Service: Gastroenterology;  Laterality: N/A;   • ENDOSCOPY N/A 1/28/2022    Procedure: ESOPHAGOGASTRODUODENOSCOPY;  Surgeon: Mingo Duarte MD;  Location: [facility] ENDOSCOPY;  Service: Gastroenterology;  Laterality: N/A;   • INTERVENTIONAL RADIOLOGY PROCEDURE N/A 10/21/2021    Procedure: tunneled central venous catheter placement;  Surgeon: Donnie Robles MD;  Location: [facility] ANGIO INVASIVE LOCATION;  Service: Interventional Radiology;  Laterality: N/A;   • INTERVENTIONAL RADIOLOGY PROCEDURE N/A 1/27/2022    Procedure: tunneled central venous catheter placement;  Surgeon: Donnie Robles MD;  Location: [facility] ANGIO INVASIVE LOCATION;  Service: Interventional Radiology;  Laterality: N/A;         OT ASSESSMENT FLOWSHEET (last 12 hours)     OT Evaluation and Treatment     Row Name 07/03/22 1438                   OT Time and Intention    Subjective Information no complaints  -RB        Document Type evaluation  -RB        Mode of Treatment individual therapy;occupational therapy  -RB        Patient Effort good  -RB                  General Information    Patient Profile Reviewed yes  -RB        Prior Level of Function independent:;all household mobility;gait;transfer;ADL's;dependent:;driving  -RB        Existing Precautions/Restrictions fall  -RB                  Previous Level of Function/Home Environm    Bathing/Grooming, Premorbid Functional Level independent  -RB        Dressing, Premorbid Functional Level independent  -RB                  Living Environment    Current Living Arrangements home  -RB        Home Accessibility wheelchair accessible  -RB        People in Home spouse  -RB        Primary Care Provided by spouse/significant other  -RB                  Home Use of Assistive/Adaptive Equipment    Equipment Currently Used at Home walker, rolling;wheelchair;oxygen;bipap  -RB                   Pain Assessment    Pretreatment Pain Rating 0/10 - no pain  -RB        Posttreatment Pain Rating 0/10 - no pain  -RB                  Cognition    Orientation Status (Cognition) oriented x 4    -RB                  Range of Motion Comprehensive    General Range of Motion no range of motion deficits identified;bilateral upper extremity ROM WNL  -RB                  Strength Comprehensive (MMT)    General Manual Muscle Testing (MMT) Assessment other (see comments)  -RB        Comment, General Manual Muscle Testing (MMT) Assessment B UE strength was 4/5 grossly.  -RB                  Activities of Daily Living    BADL Assessment/Intervention lower body dressing;grooming  -RB                  Lower Body Dressing Assessment/Training    [place] Level (Lower Body Dressing) lower body dressing skills;don;socks;maximum assist (25% patient effort)  -RB        Position (Lower Body Dressing) edge of bed sitting;supported sitting  -RB                  Grooming Assessment/Training    [place] Level (Grooming) hair care, combing/brushing;set up;standby assist  -RB        Position (Grooming) sitting up in bed  -RB                  Bed Mobility    Bed Mobility bed mobility (all) activities  -RB        All Activities, [place] (Bed Mobility) moderate assist (50% patient effort);maximum assist (25% patient effort);2 person assist  -RB                  Functional Mobility    Functional Mobility- Ind. Level not tested  -RB                  Transfer Assessment/Treatment    Transfers sit-stand transfer;stand-sit transfer  -RB                  Transfers    Sit-Stand [place] (Transfers) moderate assist (50% patient effort);maximum assist (25% patient effort);2 person assist  -RB        Stand-Sit [place] (Transfers) moderate assist (50% patient effort);maximum assist (25% patient effort);2 person assist  -RB                  Sit-Stand Transfer    Assistive Device (Sit-Stand Transfers) walker, front-wheeled  -RB                   Stand-Sit Transfer    Assistive Device (Stand-Sit Transfers) walker, front-wheeled  -RB                  Safety Issues, Functional Mobility    Impairments Affecting Function (Mobility) endurance/activity tolerance  -RB                  Wound 06/25/22 2100 Left lower leg    Wound - Properties Group Placement Date: 06/25/22 -KH Placement Time: 2100 -KH Present on Hospital Admission: Y  -KH Side: Left  -KH Orientation: lower  -KH Location: leg  -KH        Retired Wound - Properties Group Placement Date: 06/25/22 -KH Placement Time: 2100 -KH Present on Hospital Admission: Y  -KH Side: Left  -KH Orientation: lower  -KH Location: leg  -KH        Retired Wound - Properties Group Date first assessed: 06/25/22  -KH Time first assessed: 2100 -KH Present on Hospital Admission: Y  -KH Side: Left  -KH Location: leg  -KH                  Wound 07/01/22 2000 Left anterior groin MASD (Moisture associated skin damage)    Wound - Properties Group Placement Date: 07/01/22  -LS Placement Time: 2000 -LS Side: Left  -LS Orientation: anterior  -LS Location: groin  -LS Primary Wound Type: MASD  -LS        Retired Wound - Properties Group Placement Date: 07/01/22  -LS Placement Time: 2000 -LS Side: Left  -LS Orientation: anterior  -LS Location: groin  -LS Primary Wound Type: MASD  -LS        Retired Wound - Properties Group Date first assessed: 07/01/22  -LS Time first assessed: 2000 -LS Side: Left  -LS Location: groin  -LS Primary Wound Type: MASD  -LS                  Wound 07/01/22 2000 Right anterior groin MASD (Moisture associated skin damage)    Wound - Properties Group Placement Date: 07/01/22  -LS Placement Time: 2000 -LS Side: Right  -LS Orientation: anterior  -LS Location: groin  -LS Primary Wound Type: MASD  -LS        Retired Wound - Properties Group Placement Date: 07/01/22  -LS Placement Time: 2000 -LS Side: Right  -LS Orientation: anterior  -LS Location: groin  -LS Primary Wound Type: MASD  -LS         Retired Wound - Properties Group Date first assessed: 07/01/22  -LS Time first assessed: 2000 -LS Side: Right  -LS Location: groin  -LS Primary Wound Type: MASD  -LS                  Wound Left heel Other (comment)    Wound - Properties Group Side: Left  -AK Location: heel  -AK Primary Wound Type: Other  -AK        Retired Wound - Properties Group Side: Left  -AK Location: heel  -AK Primary Wound Type: Other  -AK        Retired Wound - Properties Group Side: Left  -AK Location: heel  -AK Primary Wound Type: Other  -AK                  Plan of Care Review    Plan of Care Reviewed With patient;spouse  -RB                  Vital Signs    Pre Systolic BP Rehab 126  -RB        Pre Treatment Diastolic BP 61  -RB        Post Systolic BP Rehab 119  -RB        Post Treatment Diastolic BP 55  -RB        Pretreatment Heart Rate (beats/min) 68  -RB        Posttreatment Heart Rate (beats/min) 71  -RB        Pre SpO2 (%) 95  -RB        O2 Delivery Pre Treatment supplemental O2  2 L  -RB        Post SpO2 (%) 96  -RB        O2 Delivery Post Treatment supplemental O2  -RB        Pre Patient Position Supine  -RB        Intra Patient Position Standing  -RB        Post Patient Position Supine  -RB                  Positioning and Restraints    Pre-Treatment Position in bed  -RB        Post Treatment Position bed  -RB        In Bed supine;call light within reach;encouraged to call for assist;exit alarm on;with family/caregiver  -RB                  Therapy Assessment/Plan (OT)    Date of Referral to OT 07/02/22  -RB        Functional Level at Time of Evaluation (OT) Imp mob and ADLs.  -RB        OT Diagnosis Imp mob and ADLs.  -RB        Rehab Potential (OT) good, to achieve stated therapy goals  -RB        Criteria for Skilled Therapeutic Interventions Met (OT) yes;meets criteria  -RB        Therapy Frequency (OT) other (see comments)  3-7 days/wk  -RB        Predicted Duration of Therapy Intervention (OT) Until D/C or goals met.  -RB         Problem List (OT) problems related to;balance;coordination;mobility;strength;inability to direct their own care  -RB        Activity Limitations Related to Problem List (OT) unable to ambulate safely;unable to transfer safely;BADLs not performed adequately or safely;IADLs not performed adequately or safely  -RB        Planned Therapy Interventions (OT) activity tolerance training;adaptive equipment training;BADL retraining;functional balance retraining;occupation/activity based interventions;patient/caregiver education/training;ROM/therapeutic exercise;strengthening exercise;transfer/mobility retraining  -RB                  Evaluation Complexity (OT)    Review Occupational Profile/Medical/Therapy History Complexity expanded/moderate complexity  -RB        Assessment, Occupational Performance/Identification of Deficit Complexity 3-5 performance deficits  -RB        Clinical Decision Making Complexity (OT) detailed assessment/moderate complexity  -RB        Overall Complexity of Evaluation (OT) moderate complexity  -RB                  Therapy Plan Review/Discharge Plan (OT)    Anticipated Discharge Disposition (OT) home with 24/7 care;home with home health;home with assist  -RB                  OT Goals    Transfer Goal Selection (OT) transfer, OT goal 1  -RB        Bathing Goal Selection (OT) bathing, OT goal 1  -RB        Dressing Goal Selection (OT) dressing, OT goal 1  -RB        Toileting Goal Selection (OT) toileting, OT goal 1  -RB                  Transfer Goal 1 (OT)    Activity/Assistive Device (Transfer Goal 1, OT) transfers, all  -RB        [place] Level/Cues Needed (Transfer Goal 1, OT) contact guard required  -RB        Time Frame (Transfer Goal 1, OT) long term goal (LTG)  -RB        Progress/Outcome (Transfer Goal 1, OT) goal not met  -RB                  Bathing Goal 1 (OT)    Activity/Device (Bathing Goal 1, OT) upper body bathing  -RB        [place] Level/Cues Needed (Bathing Goal  1, OT) supervision required  -RB        Time Frame (Bathing Goal 1, OT) long term goal (LTG)  -RB        Progress/Outcomes (Bathing Goal 1, OT) goal not met  -RB                  Dressing Goal 1 (OT)    Activity/Device (Dressing Goal 1, OT) dressing skills, all  -RB        [place]/Cues Needed (Dressing Goal 1, OT) contact guard required  -RB        Time Frame (Dressing Goal 1, OT) long term goal (LTG)  -RB        Progress/Outcome (Dressing Goal 1, OT) goal not met  -RB                  Toileting Goal 1 (OT)    Activity/Device (Toileting Goal 1, OT) toileting skills, all  -RB        [place] Level/Cues Needed (Toileting Goal 1, OT) supervision required  -RB        Time Frame (Toileting Goal 1, OT) long term goal (LTG)  -RB        Progress/Outcome (Toileting Goal 1, OT) goal not met  -RB              User Key  (r) = Recorded By, (t) = Taken By, (c) = Cosigned By    Initials Name Effective Dates    RB Toney Mantilla, SARA 06/16/21 -     Aminata Navas RN 06/16/21 -     Caroline Pandey RN 06/16/21 -     Isatu Odonnell RN 06/16/21 -                  Occupational Therapy Education                 Title: PT OT SLP Therapies (In Progress)     Topic: Occupational Therapy (In Progress)     Point: ADL training (Not Started)     Description:   Instruct learner(s) on proper safety adaptation and remediation techniques during self care or transfers.   Instruct in proper use of assistive devices.              Learner Progress:  Not documented in this visit.          Point: Home exercise program (Not Started)     Description:   Instruct learner(s) on appropriate technique for monitoring, assisting and/or progressing therapeutic exercises/activities.              Learner Progress:  Not documented in this visit.          Point: Precautions (Done)     Description:   Instruct learner(s) on prescribed precautions during self-care and functional transfers.              Learning Progress Summary           Patient  Acceptance, E, VU by RB at 7/3/2022 3833    Comment: Edu pt on use of gait belt and non skid socks when OOB and no OOB without assist.                   Point: Body mechanics (Not Started)     Description:   Instruct learner(s) on proper positioning and spine alignment during self-care, functional mobility activities and/or exercises.              Learner Progress:  Not documented in this visit.                      User Key     Initials Effective Dates Name Provider Type Discipline     06/16/21 -  Toney Mantilla, SARA Occupational Therapist OT                  OT Recommendation and Plan  Planned Therapy Interventions (OT): activity tolerance training, adaptive equipment training, BADL retraining, functional balance retraining, occupation/activity based interventions, patient/caregiver education/training, ROM/therapeutic exercise, strengthening exercise, transfer/mobility retraining  Therapy Frequency (OT): other (see comments) (3-7 days/wk)  Plan of Care Review  Plan of Care Reviewed With: patient  Outcome Evaluation: SARA crespo on this date.  Pt was mod to max A of 2 for all bed mobility.  She required mod to max A of 2 also for sit to stand with R/W.  Max A needed for donning sock.  Pt could benefit from OT services to regain lost function for ADLs and functional mobility.  Pt may benefit from rehab to home pending progress.  Plan of Care Reviewed With: patient  Outcome Evaluation: SARA crespo on this date.  Pt was mod to max A of 2 for all bed mobility.  She required mod to max A of 2 also for sit to stand with R/W.  Max A needed for donning sock.  Pt could benefit from OT services to regain lost function for ADLs and functional mobility.  Pt may benefit from rehab to home pending progress.     Outcome Measures     Row Name 07/03/22 1438             How much help from another is currently needed...    Putting on and taking off regular lower body clothing? 2  -RB      Bathing (including washing, rinsing, and drying) 2   -RB      Toileting (which includes using toilet bed pan or urinal) 2  -RB      Putting on and taking off regular upper body clothing 3  -RB      Taking care of personal grooming (such as brushing teeth) 3  -RB      Eating meals 4  -RB      AM-PAC 6 Clicks Score (OT) 16  -RB              Functional Assessment    Outcome Measure Options AM-PAC 6 Clicks Daily Activity (OT)  -RB            User Key  (r) = Recorded By, (t) = Taken By, (c) = Cosigned By    Initials Name Provider Type    Toney Sweet OT Occupational Therapist                Time Calculation:    Time Calculation- OT     Row Name 07/03/22 1553             Time Calculation- OT    OT Start Time 1438  -RB      OT Stop Time 1530  -RB      OT Time Calculation (min) 52 min  -RB      OT Received On 07/03/22  -RB      OT Goal Re-Cert Due Date 07/16/22  -RB            User Key  (r) = Recorded By, (t) = Taken By, (c) = Cosigned By    Initials Name Provider Type    Toney Sweet OT Occupational Therapist              Therapy Charges for Today     Code Description Service Date Service Provider Modifiers Qty    [id number]  OT EVAL MOD COMPLEXITY 3 7/3/2022 Toney Mantilla OT GO 1               Toney Mantilla OT  7/3/2022

## 2022-07-03 NOTE — THERAPY TREATMENT NOTE
CCU - Speech Language Pathology   Swallow Treatment Note [facility] ER     Patient Name: Yamileth Slater  : 1960  MRN: [id number]  Today's Date: 7/3/2022               Admit Date: 2022     Pt seen for skilled ST services this date to address oropharyngeal dysphagia.  Pt more alert and communicative this date w/a stronger, but still hoarse vocal quality.  Pt eager to consume PO; required full feeding assist.  SLP assessed pt's safety and swallow function of ice chips, NTL, and puree at the oral and pharyngeal phases of the swallow.  Pt continues to present w/cough after all ice chip trials.  Pt consumed NTL via spoon w/throat clear on first trial, but no other overt s/s of aspiration on the remaining 115 cc of NTL.  Pt consumed puree solids (full cup of apple sauce) w/no overt s/s of aspiration and efficient bolus management and oral clearance.  SLP recommends advancing diet to puree/NTL via spoon only.  SLP discussed results and recommendations w/pt, MD, and nsg staff and all in agreement.    Visit Dx:     ICD-10-CM ICD-9-CM   1. Altered mental status, unspecified altered mental status type  R41.82 780.97   2. Acute respiratory failure, unspecified whether with hypoxia or hypercapnia ([facility])  J96.00 518.81   3. Acute pulmonary edema ([facility])  J81.0 518.4   4. ESRD (end stage renal disease) ([facility])  N18.6 585.6   5. Hyperglycemia  R73.9 790.29   6. Pharyngeal dysphagia  R13.13 787.23   7. Impaired functional mobility, balance, gait, and endurance  Z74.09 V49.89     Patient Active Problem List   Diagnosis   • Chronic midline low back pain without sciatica   • Vitamin D deficiency   • Tobacco dependence syndrome   • Shoulder joint pain   • Peripheral vascular disease (HCC)   • Morbid obesity with BMI of 50.0-59.9, adult ([facility])   • Mixed hyperlipidemia   • Kidney stone   • History of colon polyps   • GERD (gastroesophageal reflux disease)   • Excoriated eczema   • Hypertension   • COPD (chronic obstructive  pulmonary disease) ([facility])   • Chronic folliculitis   • Coronary artery disease involving native coronary artery of native heart without angina pectoris   • 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   • Hypothyroidism   • Carotid artery disease ([facility])   • Marihuana abuse   • PAD (peripheral artery disease) ([facility])   • Venous insufficiency   • LAFB (left anterior fascicular block)   • Pulmonary hypertension ([facility])   • Left hip pain   • Neck pain   • MIKE and COPD overlap syndrome ([facility])   • Pneumonia due to COVID-19 virus   • Hyperkalemia   • Acute cystitis with hematuria   • Cutaneous candidiasis   • Community acquired pneumonia of right middle lobe of lung   • MRSA infection   • Esophageal dysphagia   • Monilial esophagitis ([facility])   • NSTEMI, initial episode of care ([facility])   • Cellulitis of left leg   • Urinary tract infection due to Proteus   • History of insertion of tunneled central venous catheter (CVC) with port   • Anemia due to chronic kidney disease, on chronic dialysis ([facility])   • Pneumonitis   • Personal history of noncompliance with medical treatment, presenting hazards to health   • Type 2 diabetes mellitus with autonomic neuropathy ([facility])   • Physical deconditioning   • ESRD on hemodialysis ([facility])   • DVT prophylaxis   • Accidental drug overdose   • Acute respiratory failure with hypoxia and hypercapnia ([facility])   • Anemia   • Ventilator associated pneumonia ([facility])   • Positive fecal occult blood test     Past Medical History:   Diagnosis Date   • Acute blood loss anemia 4/16/2017    Likely due to gastric oozing at this time. - Dr. Duarte (GI) was consulted and has now signed off, will follow up outpatient - pill colonoscopy showed AVMs - continue to monitor   • Acute cystitis with hematuria 3/31/2021    1/13: IV Rocephin 1 gm q 24 1/14 : transitioned  to omnicef 300mg. Urine cultures resulted and did not show growth. Omnicef discontinued as patient is asymptomatic   • Altered mental status 1/9/2022    -  AMS on presentation - initial ABG pH 7.3, CO2 34 - Procal 0.29 - UA negative for acute cysitits -CTA head wnl  - Empiric Zosyn and Vancomycin -Lactate 2.5 on admission  - blood cultures no growth at 24 hours     • Anxiety    • CAD (coronary artery disease) 4/24/2021    S/P 3 stents 5/1/2021 for [facility] Continue ASA 81mg & Clopidogrel 75mg Continue Atorvastatin 40mg   • Carotid artery stenosis    • Chronic obstructive lung disease ([facility])    • CKD (chronic kidney disease) stage 4, GFR 15-29 ml/min ([facility])    • CKD (chronic kidney disease), symptom management only, stage 5 ([facility]) 10/5/2020    Results from last 7 days Lab Units 12/15/21 0548 12/14/21 1323 12/14/21 0916 CREATININE mg/dL 3.92* 3.21* 3.32*  Baseline creatinine 2-3 GFR 13-25 GFR 15 Dialysis MWF, sees Dr. Lauren Nephrology consult,, appreciate recommendations Continue Bumex 1mg bid daily Holding Bumex 2mg 4 times a week   • Colonic polyp    • Coronary arteriosclerosis    • Diabetes mellitus ([facility])    • Diabetic neuropathy ([facility])    • Ear pain, right 10/18/2021    - canal trauma due to patient scratching and DMT2 - added cortisporin ear drops   • Elevated troponin 10/12/2021    -most likely from CKD -Trending down -Neg chest pain   • Generalized abdominal pain 7/1/2022    Could be due to initiation of tube feeds vs dyspepsia vs abdominal cramps related to no PO intake due to intubation vs constipation Continue current laxative regiment  If no bowel movement by this afternoon will consider enema   • GERD (gastroesophageal reflux disease)    • GI bleed 5/13/2021    - GI will follow up outpatient - Protonix 40mg daily - Avoid medical DVT prophy and use mechanical at this time instead. - Continue to monitor - pill colonoscopy results showed AVMs   • History of transfusion    • Hypercholesterolemia    • Hyperosmolar hyperglycemic state (HHS) ([facility]) 6/25/2022    Serum glucose 605 on admission  Anion gap 16 PH 7.37 Bicarb 27.9 Continue fluids  Insulin drip with [facility] protocol   Anion gap closed around 10 AM, received one dose of Levamir subq, will stop insulin drip after 2 hrs     • Hypertension    • Hypokalemia 5/27/2022    Will replace as needed. Will be cautious in the setting of ESRD to avoid need for emergency dialysis   Monitor Qtc intervals on EKG     • Hypomagnesemia 6/27/2021    Monitor and replace   • Morbid obesity ([facility])    • Nephrolithiasis    • On mechanically assisted ventilation ([facility]) 6/26/2022    Vent management and sedation orders placed.  - [facility] intensivist group consulted for vent management appreciate recommendations  - plan to extubate today     • Peripheral vascular disease ([facility])    • Pleural effusion on right 6/26/2022    CXR on 6/30/22 read as a small upper left pulmonary edema vs early pneumonia.  Last Echo 1/2022 EF 61-65 % Continue to monitor  Procal slightly improved, CRP improved On Linezolid and meropenum      • SIRS (systemic inflammatory response syndrome) ([facility]) 1/9/2022    Admission  - WBC 17.78   -   - RR 16 - 1/10: VSS/wnl - CXR - Mild pulm edema - Blood cultures no growth at 24 hours  - Procalcitonin 0.29 - UA : glucose 1000, negative Leucocytes/nitrate - Empiric Zosyn and Vancomcyin    • Sleep apnea    • Substance abuse ([facility])    • Vitamin D deficiency      Past Surgical History:   Procedure Laterality Date   • CARDIAC CATHETERIZATION N/A 7/14/2020   • CARDIAC CATHETERIZATION N/A 4/23/2021    Procedure: Left Heart Cath;  Surgeon: Melba Romo MD;  Location: [facility] INVASIVE LOCATION;  Service: Cardiology;  Laterality: N/A;   • CARDIAC CATHETERIZATION N/A 4/30/2021    Procedure: Percutaneous Coronary Intervention;  Surgeon: Russell Voss MD;  Location: [facility] CATH INVASIVE LOCATION;  Service: Cardiovascular;  Laterality: N/A;   • CARDIAC CATHETERIZATION N/A 4/30/2021    Procedure: Stent NIKKI coronary;  Surgeon: Russell Voss MD;  Location: [facility] CATH INVASIVE LOCATION;  Service: Cardiovascular;  Laterality:  N/A;   • CARDIAC CATHETERIZATION Left 11/13/2021    Procedure: Left Heart Cath;  Surgeon: Niall Rios MD;  Location: [facility] CATH INVASIVE LOCATION;  Service: Cardiology;  Laterality: Left;   • CAROTID STENT Left    • COLONOSCOPY     • COLONOSCOPY N/A 5/14/2021    Procedure: COLONOSCOPY;  Surgeon: Mingo Duarte MD;  Location: [facility] ENDOSCOPY;  Service: Gastroenterology;  Laterality: N/A;   • CORONARY ARTERY BYPASS GRAFT N/A 2013    CABG X 3   • CYSTOSCOPY BLADDER STONE LITHOTRIPSY Bilateral    • ENDOSCOPY N/A 4/12/2021    Procedure: ESOPHAGOGASTRODUODENOSCOPY;  Surgeon: Mingo Duarte MD;  Location: [facility] ENDOSCOPY;  Service: Gastroenterology;  Laterality: N/A;   • ENDOSCOPY N/A 5/14/2021    Procedure: ESOPHAGOGASTRODUODENOSCOPY;  Surgeon: Mingo Duarte MD;  Location: [facility] ENDOSCOPY;  Service: Gastroenterology;  Laterality: N/A;   • ENDOSCOPY N/A 1/28/2022    Procedure: ESOPHAGOGASTRODUODENOSCOPY;  Surgeon: Mingo Duarte MD;  Location: [facility] ENDOSCOPY;  Service: Gastroenterology;  Laterality: N/A;   • INTERVENTIONAL RADIOLOGY PROCEDURE N/A 10/21/2021    Procedure: tunneled central venous catheter placement;  Surgeon: Donnie Robles MD;  Location: [facility]GIO INVASIVE LOCATION;  Service: Interventional Radiology;  Laterality: N/A;   • INTERVENTIONAL RADIOLOGY PROCEDURE N/A 1/27/2022    Procedure: tunneled central venous catheter placement;  Surgeon: Donnie Robles MD;  Location: [facility] INVASIVE LOCATION;  Service: Interventional Radiology;  Laterality: N/A;       SLP Recommendation and Plan  SLP Swallowing Diagnosis: mod-severe, suspected pharyngeal dysphagia (07/03/22 0830)  SLP Diet Recommendation: nutritional supplements needed, puree, nectar thick liquids (07/03/22 0830)  Recommended Precautions and Strategies: upright posture during/after eating, small bites of food and sips of liquid, no straw, liquid via spoon only, general aspiration precautions, fatigue  precautions, assist with feeding, 1:1 supervision (07/03/22 0830)  SLP Rec. for Method of Medication Administration: meds via alternate route, meds crushed, with pudding or applesauce (07/03/22 0830)     Monitor for Signs of Aspiration: yes, notify SLP if any concerns (07/03/22 0830)     Swallow Criteria for Skilled Therapeutic Interventions Met: demonstrates skilled criteria (07/03/22 0830)  Anticipated Discharge Disposition (SLP): unknown (07/03/22 0830)  Rehab Potential/Prognosis, Swallowing: good, to achieve stated therapy goals (07/03/22 0830)  Therapy Frequency (Swallow): 3 days per week, 5 days per week (07/03/22 0830)  Predicted Duration Therapy Intervention (Days): until discharge (07/03/22 0830)     Daily Summary of Progress (SLP): progress toward functional goals as expected (07/03/22 0830)               Treatment Assessment (SLP): Pt seen for skilled ST services this date to address oropharyngeal dysphagia.  Pt more alert and communicative this date w/a stronger, but still hoarse vocal quality.  Pt eager to consume PO; required full feeding assist.  SLP assessed pt's safety and swallow function of ice chips, NTL, and puree at the oral and pharyngeal phases of the swallow.  Pt continues to present w/cough after all ice chip trials.  Pt consumed NTL via spoon w/throat clear on first trial, but no other overt s/s of aspiration on the remaining 115 cc of NTL.  Pt consumed puree solids (full cup of apple sauce) w/no overt s/s of aspiration and efficient bolus management and oral clearance.  SLP recommends advancing diet to puree/NTL via spoon only.  SLP discussed results and recommendations w/pt, MD, and nsg staff and all in agreement. (07/03/22 0830)  Plan for Continued Treatment (SLP): continue treatment per plan of care (07/03/22 0830)                SWALLOW EVALUATION (last 72 hours)     SLP Adult Swallow Evaluation     Row Name 07/03/22 0830 07/02/22 4832                Rehab Evaluation    Document Type  therapy note (daily note)  -CK evaluation  -CK       Total Evaluation Minutes, SLP 43  -CK 24  -CK       Subjective Information no complaints  -CK complains of;weakness;fatigue  -CK       Patient Observations alert;cooperative;agree to therapy  -CK alert;cooperative;agree to therapy  -CK       Patient/Family/Caregiver Comments/Observations No family present at bedside  -CK No family present at bedside  -CK       Patient Effort good  -CK good  -CK                General Information    Patient Profile Reviewed yes  -CK yes  -CK       Pertinent History Of Current Problem -- Pt extubated this date; pt nods head to having swallowing difficulties in the past, but is unable to provide additional details  -CK       Current Method of Nutrition NPO;nasogastric feedings  -CK NPO;nasogastric feedings  -CK       Prior Level of Function-Swallowing unknown  -CK unknown  -CK       Plans/Goals Discussed with patient;agreed upon  -CK patient;agreed upon  -CK                Pain Scale: Numbers Pre/Post-Treatment    Pretreatment Pain Rating 0/10 - no pain  -CK --       Posttreatment Pain Rating 0/10 - no pain  -CK --                Oral Motor Structure and Function    Dentition Assessment natural, present and adequate  -CK natural, present and adequate  -CK       Secretion Management requires suctioning to control secretions  -CK requires suctioning to control secretions  -CK       Mucosal Quality dry  -CK dry  -CK       Gag Response WFL  -CK WFL  -CK       Volitional Cough non-productive;weak;reduced respiratory support  -CK non-productive;weak;reduced respiratory support  -CK                General Eating/Swallowing Observations    Respiratory Support Currently in Use nasal cannula  -CK nasal cannula  -CK       Eating/Swallowing Skills fed by SLP  -CK fed by SLP  -CK       Positioning During Eating upright 90 degree;upright in bed  -CK upright 90 degree;upright in bed  -CK       Utensils Used spoon  -CK spoon  -CK       Consistencies  Trialed ice chips;nectar/syrup-thick liquids;pureed  -CK ice chips  -CK       Pre SpO2 (%) 97  -CK 98  -CK       Post SpO2 (%) 96  -CK 97  -CK                Clinical Swallow Eval    Pharyngeal Phase suspected pharyngeal impairment  -CK suspected pharyngeal impairment  -CK       Clinical Swallow Evaluation Summary -- Pt seen for skilled ST services this date to assess swallow function s/p extubation.  Pt was repositioned to upright in bed and SLP provided full feeding assist 2/2 weakness.  Pt consumed ice chips w/immediate cough after all trials.  Pt required suctioning; however, did not allow SLP to suction.  Pt was thoroughly educated on need and purpose of suctioning; however, pt continued to decline suction use.  SLP recommends continued NPO at this time.  Pt was educated on results and recommendations and pt nodded in agreement.  Nsg made aware of results and recommendations.  -CK                Pharyngeal Phase Concerns    Pharyngeal Phase Concerns cough;throat clear  -CK cough  -CK       Cough thin  ice chips  -CK thin  -CK       Throat Clear nectar  -CK --                Swallowing Quality of Life Assessment    Education and counseling provided Signs of aspiration;Risks of aspiration;Safest diet options;Aspiration precautions;Compensatory strategy recommendations and rationale;Feeding assistance and techniques  -CK Signs of aspiration;Risks of aspiration;Oral care recommendations and rationale;Aspiration precautions  -CK                SLP Evaluation Clinical Impression    SLP Swallowing Diagnosis mod-severe;suspected pharyngeal dysphagia  -CK mod-severe;suspected pharyngeal dysphagia  -CK       Functional Impact risk of aspiration/pneumonia  -CK risk of aspiration/pneumonia  -CK       Rehab Potential/Prognosis, Swallowing good, to achieve stated therapy goals  -CK good, to achieve stated therapy goals  -CK       Swallow Criteria for Skilled Therapeutic Interventions Met demonstrates skilled criteria  -CK  demonstrates skilled criteria  -CK                SLP Treatment Clinical Impressions    Treatment Assessment (SLP) Pt seen for skilled ST services this date to address oropharyngeal dysphagia.  Pt more alert and communicative this date w/a stronger, but still hoarse vocal quality.  Pt eager to consume PO; required full feeding assist.  SLP assessed pt's safety and swallow function of ice chips, NTL, and puree at the oral and pharyngeal phases of the swallow.  Pt continues to present w/cough after all ice chip trials.  Pt consumed NTL via spoon w/throat clear on first trial, but no other overt s/s of aspiration on the remaining 115 cc of NTL.  Pt consumed puree solids (full cup of apple sauce) w/no overt s/s of aspiration and efficient bolus management and oral clearance.  SLP recommends advancing diet to puree/NTL via spoon only.  SLP discussed results and recommendations w/pt, MD, and nsg staff and all in agreement.  -CK --       Daily Summary of Progress (SLP) progress toward functional goals as expected  -CK --       Barriers to Overall Progress (SLP) Other (see comments)  weakness  -CK Fatigue  -CK       Plan for Continued Treatment (SLP) continue treatment per plan of care  -CK --       Care Plan Review evaluation/treatment results reviewed;care plan/treatment goals reviewed;risks/benefits reviewed;current/potential barriers reviewed;patient/other agree to care plan  -CK evaluation/treatment results reviewed;care plan/treatment goals reviewed;risks/benefits reviewed;patient/other agree to care plan;current/potential barriers reviewed  -CK                Recommendations    Therapy Frequency (Swallow) 3 days per week;5 days per week  -CK 3 days per week;5 days per week  -CK       Predicted Duration Therapy Intervention (Days) until discharge  -CK until discharge  -CK       SLP Diet Recommendation nutritional supplements needed;puree;nectar thick liquids  -CK NPO;nutritional supplements needed  -CK        Recommended Precautions and Strategies upright posture during/after eating;small bites of food and sips of liquid;no straw;liquid via spoon only;general aspiration precautions;fatigue precautions;assist with feeding;1:1 supervision  -CK --       Oral Care Recommendations Oral Care BID/PRN  -CK Oral Care BID/PRN  -CK       SLP Rec. for Method of Medication Administration meds via alternate route;meds crushed;with pudding or applesauce  -CK meds via alternate route  -CK       Monitor for Signs of Aspiration yes;notify SLP if any concerns  -CK --       Anticipated Discharge Disposition (SLP) unknown  -CK --                Swallow Goals (SLP)    Swallow LTGs Patient will demonstrate progress toward functional swallow for  -CK Patient will demonstrate progress toward functional swallow for  -CK       Swallow STGs diet tolerance goal selection (SLP)  -CK diet tolerance goal selection (SLP)  -CK       Diet Tolerance Goal Selection (SLP) Patient will tolerate trials of  -CK Patient will tolerate trials of  -CK                (LTG) Patient will demonstrate progress toward functional swallow for    Diet Texture (Demonstrate progress toward functional swallow) regular textures  -CK regular textures  -CK       Liquid viscosity (Demonstrate progress toward functional swallow) thin liquids  -CK thin liquids  -CK       Time Frame (Demonstrate progress toward functional swallow) by discharge  -CK by discharge  -CK       Barriers (Demonstrate progress toward functional swallow) weakness  -CK weakness  -CK       Progress/Outcomes (Demonstrate progress toward functional swallow) goal ongoing  -CK new goal  -CK                (STG) Patient will tolerate trials of    Consistencies Trialed (Tolerate trials) thin liquids;pureed textures;nectar/ mildly thick liquids  -CK thin liquids;pureed textures  -CK       Desired Outcome (Tolerate trials) without signs/symptoms of aspiration  -CK without signs/symptoms of aspiration;with adequate oral  prep/transit/clearance  -CK       Time Frame (Tolerate trials) 1 week  -CK 1 week  -CK       Progress/Outcomes (Tolerate trials) goal ongoing;good progress toward goal  -CK new goal  -CK             User Key  (r) = Recorded By, (t) = Taken By, (c) = Cosigned By    Initials Name Effective Dates    CK May Barillas MS CCC-SLP 06/16/21 -                 EDUCATION  The patient has been educated in the following areas:   Dysphagia (Swallowing Impairment) Oral Care/Hydration Developmental Feeding Skills.        SLP GOALS     Row Name 07/03/22 0830 07/02/22 1138          (LTG) Patient will demonstrate progress toward functional swallow for    Diet Texture (Demonstrate progress toward functional swallow) regular textures  -CK regular textures  -CK     Liquid viscosity (Demonstrate progress toward functional swallow) thin liquids  -CK thin liquids  -CK     Time Frame (Demonstrate progress toward functional swallow) by discharge  -CK by discharge  -CK     Barriers (Demonstrate progress toward functional swallow) weakness  -CK weakness  -CK     Progress/Outcomes (Demonstrate progress toward functional swallow) goal ongoing  -CK new goal  -CK           User Key  (r) = Recorded By, (t) = Taken By, (c) = Cosigned By    Initials Name Provider Type    CK May Barillas MS CCC-SLP Speech and Language Pathologist                   Time Calculation:    Time Calculation- SLP     Row Name 07/03/22 0913             Time Calculation- SLP    SLP Start Time 0830  -CK      SLP Stop Time 0913  -CK      SLP Time Calculation (min) 43 min  -CK      Total Timed Code Minutes- SLP 43 minute(s)  -CK      SLP Received On 07/03/22  -CK      SLP Goal Re-Cert Due Date 07/16/22  -CK              Untimed Charges    88015-HM Treatment Swallow Minutes 43  -CK              Total Minutes    Untimed Charges Total Minutes 43  -CK       Total Minutes 43  -CK            User Key  (r) = Recorded By, (t) = Taken By, (c) = Cosigned By    Initials Name  Provider Type    CK May Barillas, MS CCC-SLP Speech and Language Pathologist                Therapy Charges for Today     Code Description Service Date Service Provider Modifiers Qty    09898798892 HC ST EVAL ORAL PHARYNG SWALLOW 2 7/2/2022 May Barillas, MS CCC-SLP GN 1    [id number] HC ST TREATMENT SWALLOW 3 7/3/2022 May Barillas, MS KILO-SLP GN 1               MS PRIMITIVO Herrera  7/3/2022

## 2022-07-03 NOTE — SIGNIFICANT NOTE
07/03/22 9520   OTHER   Discipline physical therapy assistant   Rehab Time/Intention   Session Not Performed patient unavailable for treatment  (OT present in room performing OT eval, will check back tomorrow for treatment.)

## 2022-07-03 NOTE — PLAN OF CARE
Goal Outcome Evaluation:  Plan of Care Reviewed With: patient           Outcome Evaluation: OT eval on this date.  Pt was mod to max A of 2 for all bed mobility.  She required mod to max A of 2 also for sit to stand with R/W.  Max A needed for donning sock.  Pt could benefit from OT services to regain lost function for ADLs and functional mobility.  Pt may benefit from rehab to home pending progress.

## 2022-07-03 NOTE — PROGRESS NOTES
CRITICAL CARE DAILY PROGRESS NOTE  Family Medicine Residency Service  NAME: Yamileth Slater  : 1960  MRN: [id number]      LOS: 8 days     PROVIDER OF SERVICE: Perez Hooker MD    Chief Complaint: Ventilator associated pneumonia (HCC)    Subjective:     Interval History: History provided by: patient chart  Patient reports difficulty speaking today and is interested in eating.  Reports secretions have improved. C/o headache today. Otherwise no other new complaints.  Glucose remains uncontrolled.  We will plan to move her up to regular floor today.     Feeding: Tube feeds; 0.9NS  Analgesia: Morphine IV  and Neurontin  Sedation: none  Thromboprophylaxis: SCDs  HOB: 30 degrees  Ulcer ppx: PPIs  Glucose control: SSI, Basal insulin and Prandial insulin  Bowel Regimen:Miralax and Colace   Indwelling catheters: Central line, Day# 8 and IV, Day# 8; Forman catheter is in place.  De-escalation of antibiotics: D/c meropenum, on ceftriaxone    Review of Systems:   Review of Systems   Constitutional: Negative for fever.   HENT: Positive for trouble swallowing and voice change. Negative for sore throat.    Respiratory: Positive for cough. Negative for shortness of breath.    Cardiovascular: Negative for chest pain and leg swelling.   Gastrointestinal: Negative for abdominal pain, nausea and vomiting.   Neurological: Positive for headaches.       Objective:     Vital Signs  Temp:  [97.6 °F (36.4 °C)-98.5 °F (36.9 °C)] 98 °F (36.7 °C)  Heart Rate:  [72-86] 78  Resp:  [20] 20  BP: ()/(48-63) 142/63  Body mass index is 43.59 kg/m².         I/O last 3 completed shifts:  In: .6 [I.V.:314.6; Other:114; NG/GT:858; IV Piggyback:700]  Out: 45 [Urine:45]    Physical Exam  Physical Exam  Constitutional:       Interventions: Nasal cannula in place.      Comments: NG tube in place  2L    HENT:      Head: Normocephalic and atraumatic.   Cardiovascular:      Rate and Rhythm: Normal rate and regular rhythm.       Pulses: Normal pulses.   Pulmonary:      Effort: Pulmonary effort is normal.      Breath sounds: No rhonchi or rales.   Abdominal:      Palpations: Abdomen is soft.      Tenderness: There is no abdominal tenderness.   Musculoskeletal:      Right lower leg: No edema.      Left lower leg: No edema.   Skin:     General: Skin is warm.   Neurological:      Mental Status: She is alert. Mental status is at baseline.   Psychiatric:         Mood and Affect: Mood normal.         Behavior: Behavior normal.         Medication Review    Current Facility-Administered Medications:   •  sennosides-docusate (PERICOLACE) 8.6-50 MG per tablet 2 tablet, 2 tablet, Oral, BID, 2 tablet at 07/01/22 2023 **AND** polyethylene glycol (MIRALAX) packet 17 g, 17 g, Oral, Daily PRN, 17 g at 07/01/22 1249 **AND** bisacodyl (DULCOLAX) EC tablet 5 mg, 5 mg, Oral, Daily PRN, 5 mg at 06/29/22 1715 **AND** bisacodyl (DULCOLAX) suppository 10 mg, 10 mg, Rectal, Daily PRN, Charlene Castro MD  •  [START ON 7/5/2022] bumetanide (BUMEX) tablet 2 mg, 2 mg, Oral, Once per day on Sun Tue Thu Sat, Ace Lauren MD  •  cefTRIAXone (ROCEPHIN) 1 g/100 mL 0.9% NS (MBP), 1 g, Intravenous, Q24H, Perez Hooker MD, 1 g at 07/02/22 0954  •  chlorhexidine (PERIDEX) 0.12 % solution 15 mL, 15 mL, Mouth/Throat, Q12H, Charlene Castro MD, 15 mL at 07/02/22 2036  •  dexmedetomidine (PRECEDEX) 400 mcg in 100 mL NS infusion, 0.2-1.5 mcg/kg/hr, Intravenous, Titrated, Cintia Montez MD, Stopped at 07/01/22 2337  •  dextrose (D50W) (25 g/50 mL) IV injection 10-50 mL, 10-50 mL, Intravenous, Q15 Min PRN, Charlene Castro MD  •  dextrose (D50W) (25 g/50 mL) IV injection 25 g, 25 g, Intravenous, Q15 Min PRN, Jung Leonard MD  •  dextrose (GLUTOSE) oral gel 15 g, 15 g, Oral, Q15 Min PRN, Jung Leonard MD  •  [START ON 7/4/2022] epoetin hubert (EPOGEN,PROCRIT) injection 6,000 Units, 6,000 Units, Intravenous, Once per day on Mon Wed Fri, Ace Lauren MD  •   gabapentin (NEURONTIN) 50 mg/mL solution 300 mg, 300 mg, Oral, Daily, Jerman Coffman MD, 300 mg at 07/02/22 0953  •  glucagon (human recombinant) (GLUCAGEN DIAGNOSTIC) injection 1 mg, 1 mg, Intramuscular, Q15 Min PRN, Jung Leonard MD  •  guaiFENesin (ROBITUSSIN) 100 MG/5ML oral solution 400 mg, 400 mg, Oral, Q6H PRN, Jerman Coffman MD  •  heparin (porcine) injection 4,000 Units, 4,000 Units, Intracatheter, PRN, Ace Lauren MD, 4,000 Units at 07/01/22 1237  •  heparin (porcine) injection 4,000 Units, 4,000 Units, Intracatheter, PRN, Ace Lauren MD  •  hydrALAZINE (APRESOLINE) injection 10 mg, 10 mg, Intravenous, Q6H PRN, Jung Leonard MD, 10 mg at 07/01/22 0034  •  Insulin Aspart (novoLOG) injection 0-7 Units, 0-7 Units, Subcutaneous, TID AC, Jung Leonard MD, 5 Units at 07/03/22 0631  •  Insulin Aspart (novoLOG) injection 8 Units, 8 Units, Subcutaneous, 4x Daily, Perez Hooker MD, 8 Units at 07/02/22 2038  •  insulin detemir (LEVEMIR) injection 15 Units, 15 Units, Subcutaneous, Q12H, Jung Leonard MD, 15 Units at 07/02/22 2039  •  isosorbide mononitrate (IMDUR) 24 hr tablet 30 mg, 30 mg, Oral, Q24H, Jung Leonard MD, 30 mg at 07/02/22 0802  •  levothyroxine sodium injection 18.75 mcg, 18.75 mcg, Intravenous, Daily, Jerman Coffman MD, 18.75 mcg at 07/02/22 1033  •  Linezolid (ZYVOX) 600 mg 300 mL, 600 mg, Intravenous, Q12H, Jung Leonard MD, Last Rate: 300 mL/hr at 07/02/22 2300, 600 mg at 07/02/22 2300  •  lisinopril (PRINIVIL,ZESTRIL) tablet 20 mg, 20 mg, Oral, Once per day on Sun Tue Thu Horacio Haney Lohita, MD, 20 mg at 07/02/22 0802  •  magnesium sulfate 4 gram infusion - Mg less than or equal to 1mg/dL, 4 g, Intravenous, PRN **OR** magnesium sulfate 3 gram infusion (1gm x 3) - Mg 1.1 - 1.5 mg/dL, 1 g, Intravenous, PRN **OR** Magnesium Sulfate 2 gram infusion- Mg 1.6 - 1.9 mg/dL, 2 g, Intravenous, PRN, Charlene Castro MD, Last Rate: 25 mL/hr at 07/01/22 1617, 2 g at 07/01/22 1617  •  metoprolol  tartrate (LOPRESSOR) injection 5 mg, 5 mg, Intravenous, Q5 Min PRN, Froylan Lu MD, 5 mg at 22  •  metoprolol tartrate (LOPRESSOR) tablet 25 mg, 25 mg, Oral, Q12H, Froylan Lu MD, 25 mg at 22  •  [] morphine injection 1 mg, 1 mg, Intravenous, Q4H PRN, 1 mg at 22 **AND** naloxone (NARCAN) injection 0.4 mg, 0.4 mg, Intravenous, Q5 Min PRN, Charlene Castro MD  •  nystatin (MYCOSTATIN) powder, , Topical, Q12H, Perez Hooker MD, Given at 22  •  pantoprazole (PROTONIX) injection 40 mg, 40 mg, Intravenous, Q24H, Charlene Castro MD, 40 mg at 22  •  potassium chloride (MICRO-K) CR capsule 40 mEq, 40 mEq, Oral, PRN **OR** potassium chloride (KLOR-CON) packet 40 mEq, 40 mEq, Oral, PRN, 40 mEq at 22 1210 **OR** potassium chloride 10 mEq in 100 mL IVPB, 10 mEq, Intravenous, Q1H PRN, Huy Santa MD  •  [DISCONTINUED] sodium chloride 0.9 % infusion, 200 mL/hr, Intravenous, Continuous PRN, Stopped at 22 0130 **AND** potassium chloride 10 mEq in 100 mL IVPB, 10 mEq, Intravenous, Continuous PRN, Charlene Castro MD, Stopped at 22 0130  •  [DISCONTINUED] sodium chloride 0.45 % infusion, 150 mL/hr, Intravenous, Continuous PRN, Stopped at 22 021 **AND** potassium chloride 10 mEq in 100 mL IVPB, 10 mEq, Intravenous, Continuous PRN, Charlene Castro MD, Stopped at 22  •  propofol (DIPRIVAN) infusion 10 mg/mL 100 mL, 5-50 mcg/kg/min, Intravenous, Titrated, Huy Santa MD, Stopped at 22 0531  •  scopolamine patch 1 mg/72 hr, 1 patch, Transdermal, Q72H, Jerman Coffman MD, 1 patch at 22 1111  •  sodium chloride 0.9 % flush 10 mL, 10 mL, Intravenous, Q12H, Jerman Coffman MD, 10 mL at 22 2037  •  sodium chloride 0.9 % flush 10 mL, 10 mL, Intravenous, Q12H, Jerman Coffman MD, 10 mL at 22 0801  •  sodium chloride 0.9 % flush 10 mL, 10 mL, Intravenous, Q12H, Jerman Coffman MD, 10 mL at  07/02/22 2036  •  sodium chloride 0.9 % flush 10 mL, 10 mL, Intravenous, PRN, Jerman Coffman MD  •  sodium chloride 0.9 % flush 20 mL, 20 mL, Intravenous, PRNAugustus Andrew, MD     Diagnostic Data    Lab Results (last 24 hours)     Procedure Component Value Units Date/Time    Urine Culture - Urine, Urine, Clean Catch [[id number]]  (Abnormal) Collected: 06/30/22 1607    Specimen: Urine, Clean Catch Updated: 07/03/22 0745     Urine Culture Yeast isolated     Comment: No further workup.       Narrative:      Colonization of the urinary tract without infection is common. Treatment is discouraged unless the patient is symptomatic, pregnant, or undergoing an invasive urologic procedure.    Urine Culture - Urine, Urine, Clean Catch [[id number]]  (Normal) Collected: 07/02/22 1418    Specimen: Urine, Clean Catch Updated: 07/03/22 0713     Urine Culture Culture in progress    Comprehensive Metabolic Panel [[id number]]  (Abnormal) Collected: 07/03/22 0445    Specimen: Blood Updated: 07/03/22 0520     Glucose 328 mg/dL      BUN 42 mg/dL      Creatinine 4.35 mg/dL      Sodium 128 mmol/L      Potassium 4.1 mmol/L      Chloride 90 mmol/L      CO2 25.0 mmol/L      Calcium 9.8 mg/dL      Total Protein 7.6 g/dL      Albumin 3.40 g/dL      ALT (SGPT) <5 U/L      AST (SGOT) 10 U/L      Alkaline Phosphatase 181 U/L      Total Bilirubin 0.2 mg/dL      Globulin 4.2 gm/dL      A/G Ratio 0.8 g/dL      BUN/Creatinine Ratio 9.7     Anion Gap 13.0 mmol/L      eGFR 10.9 mL/min/1.73      Comment: <15 Indicative of kidney failure       Narrative:      GFR Normal >60  Chronic Kidney Disease <60  Kidney Failure <15      Magnesium [[id number]]  (Abnormal) Collected: 07/03/22 0445    Specimen: Blood Updated: 07/03/22 0519     Magnesium 2.6 mg/dL     CBC Auto Differential [[id number]]  (Abnormal) Collected: 07/03/22 0445    Specimen: Blood Updated: 07/03/22 0501     WBC 10.15 10*3/mm3      RBC 3.63 10*6/mm3      Hemoglobin 9.6 g/dL      Hematocrit 31.4 %       MCV 86.5 fL      MCH 26.4 pg      MCHC 30.6 g/dL      RDW 16.7 %      RDW-SD 52.9 fl      MPV 8.8 fL      Platelets 337 10*3/mm3      Neutrophil % 60.5 %      Lymphocyte % 21.7 %      Monocyte % 9.1 %      Eosinophil % 4.8 %      Basophil % 1.1 %      Immature Grans % 2.8 %      Neutrophils, Absolute 6.15 10*3/mm3      Lymphocytes, Absolute 2.20 10*3/mm3      Monocytes, Absolute 0.92 10*3/mm3      Eosinophils, Absolute 0.49 10*3/mm3      Basophils, Absolute 0.11 10*3/mm3      Immature Grans, Absolute 0.28 10*3/mm3      nRBC 0.0 /100 WBC     POC Glucose Once [[id number]]  (Abnormal) Collected: 07/02/22 1936    Specimen: Blood Updated: 07/02/22 2006     Glucose 235 mg/dL      Comment: RN NotifiedOperator: [id number] LEONORA AMANDAMeter ID: [id number]       POC Glucose Once [[id number]]  (Abnormal) Collected: 07/02/22 1729    Specimen: Blood Updated: 07/02/22 1740     Glucose 221 mg/dL      Comment: Sliding Scale AdminOperator: [id number] PHILLIP MADISONMeter ID: [id number]       Potassium [[id number]]  (Normal) Collected: 07/02/22 1543    Specimen: Blood Updated: 07/02/22 1609     Potassium 4.4 mmol/L     Urinalysis, Microscopic Only - Urine, Clean Catch [[id number]]  (Abnormal) Collected: 07/02/22 1418    Specimen: Urine, Clean Catch Updated: 07/02/22 1514     RBC, UA None Seen /HPF      WBC, UA Too Numerous to Count /HPF      Bacteria, UA 3+ /HPF      Squamous Epithelial Cells, UA 0-2 /HPF      Hyaline Casts, UA 3-6 /LPF      Methodology Manual Light Microscopy    Urinalysis With Culture If Indicated - Urine, Clean Catch [[id number]]  (Abnormal) Collected: 07/02/22 1418    Specimen: Urine, Clean Catch Updated: 07/02/22 1436     Color, UA Dark Yellow     Appearance, UA Turbid     pH, UA 5.5     Specific Gravity, UA 1.029     Glucose,  mg/dL (1+)     Ketones, UA 15 mg/dL (1+)     Bilirubin, UA Small (1+)     Blood, UA Small (1+)     Protein, UA >=300 mg/dL (3+)     Leuk Esterase, UA Large (3+)     Nitrite, UA  Negative     Urobilinogen, UA 1.0 E.U./dL    Narrative:      In absence of clinical symptoms, the presence of pyuria, bacteria, and/or nitrites on the urinalysis result does not correlate with infection.    Wound Culture - Wound, Leg, Left [[id number]] Collected: 06/29/22 0632    Specimen: Wound from Leg, Left Updated: 07/02/22 1236     Wound Culture No growth at 3 days     Gram Stain Few (2+) WBCs seen      No organisms seen    POC Glucose Once [[id number]]  (Abnormal) Collected: 07/02/22 1105    Specimen: Blood Updated: 07/02/22 1127     Glucose 260 mg/dL      Comment: Sliding Scale AdminOperator: [id number] PHILLIP MADISONMeter ID: [id number]       Urinalysis, Microscopic Only - Urine, Clean Catch [[id number]]  (Abnormal) Collected: 06/30/22 1607    Specimen: Urine, Clean Catch Updated: 07/02/22 1020     RBC, UA Too Numerous to Count /HPF      WBC, UA Too Numerous to Count /HPF      Bacteria, UA 4+ /HPF      Squamous Epithelial Cells, UA None Seen /HPF      Yeast, UA Large/3+ Budding Yeast /HPF      Hyaline Casts, UA 21-30 /LPF      Methodology Manual Light Microscopy    Urinalysis With Culture If Indicated - Urine, Clean Catch [[id number]]  (Abnormal) Collected: 06/30/22 1607    Specimen: Urine, Clean Catch Updated: 07/02/22 1020     Color, UA Dark Yellow     Appearance, UA Turbid     pH, UA 5.5     Specific Gravity, UA 1.020     Glucose,  mg/dL (1+)     Ketones, UA 15 mg/dL (1+)     Bilirubin, UA Negative     Blood, UA Large (3+)     Protein, UA >=300 mg/dL (3+)     Leuk Esterase, UA Large (3+)     Nitrite, UA Negative     Urobilinogen, UA 0.2 E.U./dL    Narrative:      In absence of clinical symptoms, the presence of pyuria, bacteria, and/or nitrites on the urinalysis result does not correlate with infection.    Respiratory Culture - Sputum, Oropharynx [[id number]]  (Abnormal)  (Susceptibility) Collected: 06/30/22 1029    Specimen: Sputum from Oropharynx Updated: 07/02/22 0822     Respiratory Culture Scant  growth (1+) Staphylococcus aureus, MRSA     Comment: Methicillin resistant Staphylococcus aureus, Patient may be an isolation risk.         No Normal Respiratory Nataly     Comment: Methicillin resistant Staphylococcus aureus, Patient may be an isolation risk.        Gram Stain Many (4+) WBCs seen      Few (2+) Epithelial cells per low power field      Rare (1+) Mixed bacterial nataly    Susceptibility      Staphylococcus aureus, MRSA      JULIO CÉSAR      Clindamycin Susceptible      Inducible Clindamycin Resistance Negative      Linezolid Susceptible      Oxacillin Resistant     Tetracycline Susceptible      Trimethoprim + Sulfamethoxazole Susceptible      Vancomycin Susceptible                    Linear View               Susceptibility Comments     Staphylococcus aureus, MRSA    This isolate does not demonstrate inducible clindamycin resistance in vitro.               Blood Culture - Blood, Arm, Right [[id number]]  (Normal) Collected: 06/30/22 0746    Specimen: Blood from Arm, Right Updated: 07/02/22 0801     Blood Culture No growth at 2 days    Blood Culture - Blood, Blood, Central Line [[id number]]  (Normal) Collected: 06/30/22 0746    Specimen: Blood, Central Line Updated: 07/02/22 0801     Blood Culture No growth at 2 days          I reviewed the patient's new clinical results.    Assessment/Plan:     Active Hospital Problems    Diagnosis  POA   • **Ventilator associated pneumonia (HCC) [J95.851]  Yes     Priority: High     Procal slightly improved   CRP improved, repeat q48 hrs  Extubated   No fever overnight  Blood cx negative x5 days  respiratory culture positive for MRSA  On linezolid      • Type 2 diabetes mellitus with autonomic neuropathy (HCC) [E11.43]  Yes     Priority: Medium     On low SSI  Consider changing Levemir 20 units q12hr  On 8u QID  Extubated, failed swallow study  will plan to switch tube feeds to PO when possible      • Positive fecal occult blood test [R19.5]  Unknown     Hgb stable  GI consulted  and appreciate recs     • Accidental drug overdose [T50.901A]  Yes     Family reports of 10 tablets of 10 mg of Flexeril ingested.  - Overdose labs obtained including UDS, acetaminophen, salicylate, ethanol level  - EKG showed right bundle branch block  - Poison control notified they Recommend symptomatic care  - Monitor Qtc interval changes, improved  - Continues to be sinus tachy when performing SBTs        • Acute respiratory failure with hypoxia and hypercapnia ([facility]) [J96.01, J96.02]  Yes   • Anemia [D64.9]  Yes     Hgb 6.9 on admission   - received 2u PRBC  - Type and screen   - Monitor H &H   -- H&H stable      • ESRD on hemodialysis ([facility]) [N18.6, Z99.2]  Not Applicable     -Receives hemodialysis MWF at [facility] in [place]  Missed dialysis per family prior to arrival   Nephrology consulted appreciated recs          • Acute cystitis with hematuria [N30.01]  Unknown     Fever of 100.6 on 6/30  UA showed 3+ blood, 3+ leuk esterase, Toshia +4  Meropenum changed to ceftriaxone due to improvement  On linezolid     • Hypothyroidism [E03.9]  Yes     -home synthroid 25 mcg  -will start IV synthroid and give PO once extubated and tolerating PO intake     • Hypertension [I10]  Yes     On home metoprolol and imdur  Lisinopril on non-HD days  BP stable  - PRN Hydralazine       • Coronary artery disease involving native coronary artery of native heart without angina pectoris [I25.10]  Yes     - S/P CABG, Bilateral carotid artery stenosis w/ 2 stents on left side,  PAD (peripheral artery disease) with stent in right upper leg  - Cardiology on board       • COPD (chronic obstructive pulmonary disease) ([facility]) [J44.9]  Yes     - will start home inhalers and trilogy at night once up on regular floor         Code status is   Code Status and Medical Interventions:   Ordered at: 06/25/22 4695     Level Of Support Discussed With:    Patient     Code Status (Patient has no pulse and is not breathing):    CPR (Attempt to  Resuscitate)     Medical Interventions (Patient has pulse or is breathing):    Full Support        Prognosis: fair  Plan for disposition:Where: home and home health and When:  2-4days    Time: 30 mins      This document has been electronically signed by Perez Hooker MD on July 3, 2022 07:48 CDT

## 2022-07-03 NOTE — PROGRESS NOTES
"[facility]  [address] [place] [address]. [address]  T - [phone]  F - [phone]     Progress Note          PATIENT  DEMOGRAPHICS   PATIENT NAME: Yamileth Slater                      PHYSICIAN: Ace Lauren MD  : 1960  MRN: [id number]   LOS: 8 days    Patient Care Team:  Rianna Macias MD as PCP - General (Family Medicine)  Subjective   SUBJECTIVE   bp low normal. UO low         Objective   OBJECTIVE   Vital Signs  Temp:  [97.6 °F (36.4 °C)-98.5 °F (36.9 °C)] 98.1 °F (36.7 °C)  Heart Rate:  [68-86] 68  Resp:  [18-20] 18  BP: ()/(48-65) 111/64    Flowsheet Rows    Flowsheet Row First Filed Value   Admission Height 157.5 cm (62\") Documented at 2022 1743   Admission Weight 135 kg (297 lb) Documented at 2022 1743           I/O last 3 completed shifts:  In: 1986.6 [I.V.:314.6; Other:114; NG/GT:858; IV Piggyback:700]  Out: 45 [Urine:45]    PHYSICAL EXAM    Physical Exam  Constitutional:       Appearance: She is well-developed. She is ill-appearing.   HENT:      Head: Normocephalic.   Eyes:      Pupils: Pupils are equal, round, and reactive to light.   Cardiovascular:      Rate and Rhythm: Normal rate and regular rhythm.      Heart sounds: Normal heart sounds.   Pulmonary:      Effort: Pulmonary effort is normal.      Breath sounds: Normal breath sounds.   Abdominal:      General: Bowel sounds are normal.      Palpations: Abdomen is soft.   Musculoskeletal:         General: No swelling.   Skin:     Coloration: Skin is not jaundiced.   Neurological:      Deep Tendon Reflexes: Reflexes normal.         RESULTS   Results Review:    Results from last 7 days   Lab Units 22  0445 22  1543 22  0334 22  0558   SODIUM mmol/L 128*  --  129* 134*   POTASSIUM mmol/L 4.1 4.4 3.1* 4.1   CHLORIDE mmol/L 90*  --  88* 94*   CO2 mmol/L 25.0  --  27.0 24.0   BUN mg/dL 42*  --  25* 37*   CREATININE mg/dL 4.35*  --  3.00* 3.67*   CALCIUM mg/dL 9.8  --  " 9.5 9.3   BILIRUBIN mg/dL 0.2  --  0.3 0.3   ALK PHOS U/L 181*  --  163* 144*   ALT (SGPT) U/L <5  --  <5 <5   AST (SGOT) U/L 10  --  7 10   GLUCOSE mg/dL 328*  --  336* 289*       Estimated Creatinine Clearance: 15.3 mL/min (A) (by C-G formula based on SCr of 4.35 mg/dL (H)).    Results from last 7 days   Lab Units 07/03/22  0445 07/02/22  0334 07/01/22  0558 06/27/22 2010 06/27/22  0837 06/27/22  0408 06/26/22 2027   MAGNESIUM mg/dL 2.6* 2.6* 1.9   < > 2.1 2.0 2.2   PHOSPHORUS mg/dL  --   --   --   --  4.3 4.1 3.8    < > = values in this interval not displayed.             Results from last 7 days   Lab Units 07/03/22  0445 07/02/22  0334 07/01/22  0548 06/30/22  0716 06/29/22  0516   WBC 10*3/mm3 10.15 11.53* 12.12* 11.78* 10.44   HEMOGLOBIN g/dL 9.6* 9.4* 9.5* 10.0* 8.6*   PLATELETS 10*3/mm3 337 259 347 265 303               Imaging Results (Last 24 Hours)     ** No results found for the last 24 hours. **           MEDICATIONS    [START ON 7/5/2022] bumetanide, 2 mg, Oral, Once per day on Sun Tue Thu Sat  cefTRIAXone, 1 g, Intravenous, Q24H  chlorhexidine, 15 mL, Mouth/Throat, Q12H  [START ON 7/4/2022] epoetin hubert/hubert-epbx, 6,000 Units, Intravenous, Once per day on Mon Wed Fri  gabapentin, 300 mg, Oral, Daily  Insulin Aspart, 0-7 Units, Subcutaneous, TID AC  Insulin Aspart, 8 Units, Subcutaneous, 4x Daily  [START ON 7/4/2022] insulin detemir, 15 Units, Subcutaneous, Daily  insulin detemir, 20 Units, Subcutaneous, Nightly  isosorbide mononitrate, 30 mg, Oral, Q24H  levothyroxine sodium, 18.75 mcg, Intravenous, Daily  Linezolid, 600 mg, Intravenous, Q12H  lisinopril, 20 mg, Oral, Once per day on Sun Tue Thu Sat  metoprolol tartrate, 25 mg, Oral, Q12H  nystatin, , Topical, Q12H  pantoprazole, 40 mg, Intravenous, Q24H  Scopolamine, 1 patch, Transdermal, Q72H  senna-docusate sodium, 2 tablet, Oral, BID  sodium chloride, 10 mL, Intravenous, Q12H  sodium chloride, 10 mL, Intravenous, Q12H  sodium chloride, 10 mL,  Intravenous, Q12H      dexmedetomidine, 0.2-1.5 mcg/kg/hr, Last Rate: Stopped (07/01/22 3127)  potassium chloride, 10 mEq, Last Rate: Stopped (06/26/22 0130)  potassium chloride, 10 mEq, Last Rate: Stopped (06/26/22 0212)  propofol, 5-50 mcg/kg/min, Last Rate: Stopped (07/02/22 0531)        Assessment & Plan   ASSESSMENT / PLAN      Ventilator associated pneumonia (HCC)    Hypertension    COPD (chronic obstructive pulmonary disease) (HCC)    Coronary artery disease involving native coronary artery of native heart without angina pectoris    Hypothyroidism    Acute cystitis with hematuria    Type 2 diabetes mellitus with autonomic neuropathy (HCC)    ESRD on hemodialysis (HCC)    Accidental drug overdose    Acute respiratory failure with hypoxia and hypercapnia (HCC)    Anemia    Positive fecal occult blood test    1.ESRD dependent on hemodialysis since October 2021.  Has tunnel catheter. Patient is on dialysis MWF at [facility]. Lost weight from 297 to 260 lbs and now on bumex po non dialysis days. Hd tomorrow     2. Anemia chronic kidney disease.  Patient had extensive work-up in the past by Dr. Munoz. She also has B12 deficiency.  She has history of AVM.  She received 2 units of packed RBCs. Now on epogen 6000 units     3. htn - runs low with hd. Lisinopril on non dialysis day    4. Possible PNA/Chronic Resp Failure related to COPD-now extubated 7/1/22. She is MRSA screen positive.Patient is on home oxygen and trilogy when sleeping.  On zyvox     5. Possible OD with flexeril- rec'd activated charcoal per g tube     6. DM- Glucose was 605 on arrival- management per primary team.      7.coronary artery disease     8.chronic kidney disease/mineral and bone disorder on sevelamer                 This document has been electronically signed by Ace Lauren MD on July 3, 2022 10:03 CDT

## 2022-07-04 LAB
ALBUMIN SERPL-MCNC: 3.2 G/DL (ref 3.5–5.2)
ALBUMIN/GLOB SERPL: 0.7 G/DL
ALP SERPL-CCNC: 198 U/L (ref 39–117)
ALT SERPL W P-5'-P-CCNC: <5 U/L (ref 1–33)
ANION GAP SERPL CALCULATED.3IONS-SCNC: 16 MMOL/L (ref 5–15)
AST SERPL-CCNC: 10 U/L (ref 1–32)
BACTERIA SPEC AEROBE CULT: ABNORMAL
BASOPHILS # BLD AUTO: 0.1 10*3/MM3 (ref 0–0.2)
BASOPHILS NFR BLD AUTO: 1.1 % (ref 0–1.5)
BILIRUB SERPL-MCNC: 0.2 MG/DL (ref 0–1.2)
BUN SERPL-MCNC: 60 MG/DL (ref 8–23)
BUN/CREAT SERPL: 10.6 (ref 7–25)
CALCIUM SPEC-SCNC: 9.2 MG/DL (ref 8.6–10.5)
CHLORIDE SERPL-SCNC: 89 MMOL/L (ref 98–107)
CO2 SERPL-SCNC: 23 MMOL/L (ref 22–29)
CREAT SERPL-MCNC: 5.65 MG/DL (ref 0.57–1)
CRP SERPL-MCNC: 8.25 MG/DL (ref 0–0.5)
DEPRECATED RDW RBC AUTO: 49.9 FL (ref 37–54)
EGFRCR SERPLBLD CKD-EPI 2021: 7.9 ML/MIN/1.73
EOSINOPHIL # BLD AUTO: 0.51 10*3/MM3 (ref 0–0.4)
EOSINOPHIL NFR BLD AUTO: 5.8 % (ref 0.3–6.2)
ERYTHROCYTE [DISTWIDTH] IN BLOOD BY AUTOMATED COUNT: 16.2 % (ref 12.3–15.4)
GLOBULIN UR ELPH-MCNC: 4.3 GM/DL
GLUCOSE BLDC GLUCOMTR-MCNC: 295 MG/DL (ref 70–130)
GLUCOSE BLDC GLUCOMTR-MCNC: 320 MG/DL (ref 70–130)
GLUCOSE BLDC GLUCOMTR-MCNC: 332 MG/DL (ref 70–130)
GLUCOSE BLDC GLUCOMTR-MCNC: 350 MG/DL (ref 70–130)
GLUCOSE SERPL-MCNC: 383 MG/DL (ref 65–99)
HCT VFR BLD AUTO: 30.6 % (ref 34–46.6)
HGB BLD-MCNC: 9.7 G/DL (ref 12–15.9)
IMM GRANULOCYTES # BLD AUTO: 0.13 10*3/MM3 (ref 0–0.05)
IMM GRANULOCYTES NFR BLD AUTO: 1.5 % (ref 0–0.5)
LYMPHOCYTES # BLD AUTO: 1.81 10*3/MM3 (ref 0.7–3.1)
LYMPHOCYTES NFR BLD AUTO: 20.5 % (ref 19.6–45.3)
MCH RBC QN AUTO: 26.9 PG (ref 26.6–33)
MCHC RBC AUTO-ENTMCNC: 31.7 G/DL (ref 31.5–35.7)
MCV RBC AUTO: 84.8 FL (ref 79–97)
MONOCYTES # BLD AUTO: 0.64 10*3/MM3 (ref 0.1–0.9)
MONOCYTES NFR BLD AUTO: 7.3 % (ref 5–12)
NEUTROPHILS NFR BLD AUTO: 5.62 10*3/MM3 (ref 1.7–7)
NEUTROPHILS NFR BLD AUTO: 63.8 % (ref 42.7–76)
NRBC BLD AUTO-RTO: 0 /100 WBC (ref 0–0.2)
PLATELET # BLD AUTO: 302 10*3/MM3 (ref 140–450)
PMV BLD AUTO: 8.7 FL (ref 6–12)
POTASSIUM SERPL-SCNC: 4.2 MMOL/L (ref 3.5–5.2)
PROT SERPL-MCNC: 7.5 G/DL (ref 6–8.5)
QT INTERVAL: 402 MS
QT INTERVAL: 472 MS
QTC INTERVAL: 582 MS
QTC INTERVAL: 612 MS
RBC # BLD AUTO: 3.61 10*6/MM3 (ref 3.77–5.28)
SODIUM SERPL-SCNC: 128 MMOL/L (ref 136–145)
WBC NRBC COR # BLD: 8.81 10*3/MM3 (ref 3.4–10.8)

## 2022-07-04 PROCEDURE — 25010000002 EPOETIN ALFA PER 1000 UNITS: Performed by: INTERNAL MEDICINE

## 2022-07-04 PROCEDURE — 86140 C-REACTIVE PROTEIN: CPT | Performed by: STUDENT IN AN ORGANIZED HEALTH CARE EDUCATION/TRAINING PROGRAM

## 2022-07-04 PROCEDURE — 97530 THERAPEUTIC ACTIVITIES: CPT

## 2022-07-04 PROCEDURE — 63710000001 INSULIN ASPART PER 5 UNITS: Performed by: STUDENT IN AN ORGANIZED HEALTH CARE EDUCATION/TRAINING PROGRAM

## 2022-07-04 PROCEDURE — 80053 COMPREHEN METABOLIC PANEL: CPT | Performed by: STUDENT IN AN ORGANIZED HEALTH CARE EDUCATION/TRAINING PROGRAM

## 2022-07-04 PROCEDURE — 25010000002 CEFTRIAXONE PER 250 MG: Performed by: STUDENT IN AN ORGANIZED HEALTH CARE EDUCATION/TRAINING PROGRAM

## 2022-07-04 PROCEDURE — 99232 SBSQ HOSP IP/OBS MODERATE 35: CPT | Performed by: STUDENT IN AN ORGANIZED HEALTH CARE EDUCATION/TRAINING PROGRAM

## 2022-07-04 PROCEDURE — 82962 GLUCOSE BLOOD TEST: CPT

## 2022-07-04 PROCEDURE — 63710000001 INSULIN DETEMIR PER 5 UNITS: Performed by: STUDENT IN AN ORGANIZED HEALTH CARE EDUCATION/TRAINING PROGRAM

## 2022-07-04 PROCEDURE — 85025 COMPLETE CBC W/AUTO DIFF WBC: CPT | Performed by: STUDENT IN AN ORGANIZED HEALTH CARE EDUCATION/TRAINING PROGRAM

## 2022-07-04 PROCEDURE — 25010000002 HEPARIN (PORCINE) PER 1000 UNITS: Performed by: INTERNAL MEDICINE

## 2022-07-04 PROCEDURE — 94760 N-INVAS EAR/PLS OXIMETRY 1: CPT

## 2022-07-04 PROCEDURE — 25010000002 LINEZOLID 600 MG/300ML SOLUTION: Performed by: STUDENT IN AN ORGANIZED HEALTH CARE EDUCATION/TRAINING PROGRAM

## 2022-07-04 PROCEDURE — 99232 SBSQ HOSP IP/OBS MODERATE 35: CPT | Performed by: INTERNAL MEDICINE

## 2022-07-04 RX ORDER — FLUCONAZOLE 100 MG/1
100 TABLET ORAL
Status: DISCONTINUED | OUTPATIENT
Start: 2022-07-05 | End: 2022-07-07 | Stop reason: HOSPADM

## 2022-07-04 RX ORDER — GUAIFENESIN 600 MG/1
1200 TABLET, EXTENDED RELEASE ORAL EVERY 12 HOURS SCHEDULED
Status: DISCONTINUED | OUTPATIENT
Start: 2022-07-04 | End: 2022-07-07 | Stop reason: HOSPADM

## 2022-07-04 RX ORDER — FLUCONAZOLE 200 MG/1
200 TABLET ORAL DAILY
Status: DISCONTINUED | OUTPATIENT
Start: 2022-07-04 | End: 2022-07-04

## 2022-07-04 RX ORDER — HEPARIN SODIUM 1000 [USP'U]/ML
4000 INJECTION, SOLUTION INTRAVENOUS; SUBCUTANEOUS AS NEEDED
Status: DISCONTINUED | OUTPATIENT
Start: 2022-07-04 | End: 2022-07-07 | Stop reason: HOSPADM

## 2022-07-04 RX ORDER — GUAIFENESIN 600 MG/1
1200 TABLET, EXTENDED RELEASE ORAL EVERY 12 HOURS SCHEDULED
Status: DISCONTINUED | OUTPATIENT
Start: 2022-07-04 | End: 2022-07-04 | Stop reason: SDUPTHER

## 2022-07-04 RX ORDER — GABAPENTIN 300 MG/1
300 CAPSULE ORAL DAILY
Status: DISCONTINUED | OUTPATIENT
Start: 2022-07-04 | End: 2022-07-07 | Stop reason: HOSPADM

## 2022-07-04 RX ORDER — ALBUMIN (HUMAN) 12.5 G/50ML
12.5 SOLUTION INTRAVENOUS AS NEEDED
Status: ACTIVE | OUTPATIENT
Start: 2022-07-04 | End: 2022-07-04

## 2022-07-04 RX ORDER — NICOTINE POLACRILEX 4 MG
15 LOZENGE BUCCAL
Status: DISCONTINUED | OUTPATIENT
Start: 2022-07-04 | End: 2022-07-04 | Stop reason: SDUPTHER

## 2022-07-04 RX ORDER — DEXTROSE MONOHYDRATE 25 G/50ML
25 INJECTION, SOLUTION INTRAVENOUS
Status: DISCONTINUED | OUTPATIENT
Start: 2022-07-04 | End: 2022-07-04 | Stop reason: SDUPTHER

## 2022-07-04 RX ORDER — INSULIN ASPART 100 [IU]/ML
0-9 INJECTION, SOLUTION INTRAVENOUS; SUBCUTANEOUS
Status: DISCONTINUED | OUTPATIENT
Start: 2022-07-04 | End: 2022-07-07 | Stop reason: HOSPADM

## 2022-07-04 RX ORDER — FLUCONAZOLE 200 MG/1
200 TABLET ORAL
Status: DISCONTINUED | OUTPATIENT
Start: 2022-07-06 | End: 2022-07-07 | Stop reason: HOSPADM

## 2022-07-04 RX ADMIN — INSULIN ASPART 7 UNITS: 100 INJECTION, SOLUTION INTRAVENOUS; SUBCUTANEOUS at 14:05

## 2022-07-04 RX ADMIN — Medication 10 ML: at 20:48

## 2022-07-04 RX ADMIN — METOPROLOL TARTRATE 25 MG: 25 TABLET, FILM COATED ORAL at 20:46

## 2022-07-04 RX ADMIN — ISOSORBIDE MONONITRATE 30 MG: 30 TABLET, EXTENDED RELEASE ORAL at 08:59

## 2022-07-04 RX ADMIN — CEFTRIAXONE SODIUM 1 G: 1 INJECTION, POWDER, FOR SOLUTION INTRAMUSCULAR; INTRAVENOUS at 14:06

## 2022-07-04 RX ADMIN — INSULIN ASPART 8 UNITS: 100 INJECTION, SOLUTION INTRAVENOUS; SUBCUTANEOUS at 20:49

## 2022-07-04 RX ADMIN — INSULIN ASPART 8 UNITS: 100 INJECTION, SOLUTION INTRAVENOUS; SUBCUTANEOUS at 14:06

## 2022-07-04 RX ADMIN — INSULIN DETEMIR 25 UNITS: 100 INJECTION, SOLUTION SUBCUTANEOUS at 08:52

## 2022-07-04 RX ADMIN — HEPARIN SODIUM 4000 UNITS: 1000 INJECTION INTRAVENOUS; SUBCUTANEOUS at 13:32

## 2022-07-04 RX ADMIN — GABAPENTIN 300 MG: 300 CAPSULE ORAL at 14:03

## 2022-07-04 RX ADMIN — INSULIN ASPART 8 UNITS: 100 INJECTION, SOLUTION INTRAVENOUS; SUBCUTANEOUS at 18:10

## 2022-07-04 RX ADMIN — LEVOTHYROXINE SODIUM ANHYDROUS 18.75 MCG: 100 INJECTION, POWDER, LYOPHILIZED, FOR SOLUTION INTRAVENOUS at 14:04

## 2022-07-04 RX ADMIN — NYSTATIN: 100000 POWDER TOPICAL at 20:47

## 2022-07-04 RX ADMIN — METOPROLOL TARTRATE 25 MG: 25 TABLET, FILM COATED ORAL at 08:59

## 2022-07-04 RX ADMIN — PANTOPRAZOLE SODIUM 40 MG: 40 INJECTION, POWDER, FOR SOLUTION INTRAVENOUS at 09:01

## 2022-07-04 RX ADMIN — Medication 10 ML: at 09:00

## 2022-07-04 RX ADMIN — Medication 10 ML: at 09:01

## 2022-07-04 RX ADMIN — NYSTATIN: 100000 POWDER TOPICAL at 08:59

## 2022-07-04 RX ADMIN — GUAIFENESIN 1200 MG: 600 TABLET, EXTENDED RELEASE ORAL at 14:04

## 2022-07-04 RX ADMIN — INSULIN ASPART 7 UNITS: 100 INJECTION, SOLUTION INTRAVENOUS; SUBCUTANEOUS at 18:09

## 2022-07-04 RX ADMIN — Medication 10 ML: at 20:46

## 2022-07-04 RX ADMIN — INSULIN ASPART 8 UNITS: 100 INJECTION, SOLUTION INTRAVENOUS; SUBCUTANEOUS at 08:59

## 2022-07-04 RX ADMIN — INSULIN DETEMIR 25 UNITS: 100 INJECTION, SOLUTION SUBCUTANEOUS at 20:47

## 2022-07-04 RX ADMIN — EPOETIN ALFA 6000 UNITS: 10000 SOLUTION INTRAVENOUS; SUBCUTANEOUS at 12:31

## 2022-07-04 RX ADMIN — Medication 10 ML: at 20:47

## 2022-07-04 RX ADMIN — INSULIN ASPART 7 UNITS: 100 INJECTION, SOLUTION INTRAVENOUS; SUBCUTANEOUS at 06:30

## 2022-07-04 RX ADMIN — LINEZOLID 600 MG: 600 INJECTION, SOLUTION INTRAVENOUS at 14:06

## 2022-07-04 RX ADMIN — GUAIFENESIN 1200 MG: 600 TABLET, EXTENDED RELEASE ORAL at 20:46

## 2022-07-04 NOTE — PLAN OF CARE
Goal Outcome Evaluation:  Plan of Care Reviewed With: caregiver, patient        Progress: improving  Outcome Evaluation: Pt extubabated.  Po diet started after swallowing eval. Will monitor add supplements.  Enteral nutrition to be discontinued.

## 2022-07-04 NOTE — PLAN OF CARE
Goal Outcome Evaluation:  Plan of Care Reviewed With: patient        Progress: no change       Pt's orientation has been a little soft today. She keeps throwing a leg off the bed and insisting she is going home. Her family brought in a home Trilogy machine for use todnight. NG tube and feed were discontinued today. Pt is still on a puree diet. She ate a bag of Cheetos brought in by family. She was warned of the choking risk, but insisted on eating the snack anyway. Blood sugar has been elevated throughout the day.

## 2022-07-04 NOTE — SIGNIFICANT NOTE
07/04/22 1059   OTHER   Discipline speech language pathologist   Rehab Time/Intention   Session Not Performed patient unavailable for treatment  (Pt receiving dialysis)

## 2022-07-04 NOTE — PROGRESS NOTES
"[facility]  [address] [place] [address]. [id number]  T - [phone]  F - [phone]     Progress Note          PATIENT  DEMOGRAPHICS   PATIENT NAME: Yamileth Slater                      PHYSICIAN: Ace Lauren MD  : 1960  MRN: [id number]   LOS: 9 days    Patient Care Team:  Rianna Macias MD as PCP - General (Family Medicine)  Subjective   SUBJECTIVE   bp high before hd, no soa, able to take pureed diet         Objective   OBJECTIVE   Vital Signs  Temp:  [97 °F (36.1 °C)-98.6 °F (37 °C)] 97.8 °F (36.6 °C)  Heart Rate:  [66-82] 74  Resp:  [18] 18  BP: (108-173)/(54-72) 173/72    Flowsheet Rows    Flowsheet Row First Filed Value   Admission Height 157.5 cm (62\") Documented at 2022 1743   Admission Weight 135 kg (297 lb) Documented at 2022 1743           I/O last 3 completed shifts:  In: 923 [P.O.:120; Other:162; NG/GT:341; IV Piggyback:300]  Out: 100 [Urine:100]    PHYSICAL EXAM    Physical Exam  Constitutional:       Appearance: She is well-developed. She is ill-appearing.   HENT:      Head: Normocephalic.   Eyes:      Pupils: Pupils are equal, round, and reactive to light.   Cardiovascular:      Rate and Rhythm: Normal rate and regular rhythm.      Heart sounds: Normal heart sounds.   Pulmonary:      Effort: Pulmonary effort is normal.      Breath sounds: Normal breath sounds.   Abdominal:      General: Bowel sounds are normal.      Palpations: Abdomen is soft.   Musculoskeletal:         General: No swelling.   Skin:     Coloration: Skin is not jaundiced.   Neurological:      Deep Tendon Reflexes: Reflexes normal.         RESULTS   Results Review:    Results from last 7 days   Lab Units 22  0646 22  0445 22  1543 22  0334   SODIUM mmol/L 128* 128*  --  129*   POTASSIUM mmol/L 4.2 4.1 4.4 3.1*   CHLORIDE mmol/L 89* 90*  --  88*   CO2 mmol/L 23.0 25.0  --  27.0   BUN mg/dL 60* 42*  --  25*   CREATININE mg/dL 5.65* 4.35*  --  3.00* "   CALCIUM mg/dL 9.2 9.8  --  9.5   BILIRUBIN mg/dL 0.2 0.2  --  0.3   ALK PHOS U/L 198* 181*  --  163*   ALT (SGPT) U/L <5 <5  --  <5   AST (SGOT) U/L 10 10  --  7   GLUCOSE mg/dL 383* 328*  --  336*       Estimated Creatinine Clearance: 12.8 mL/min (A) (by C-G formula based on SCr of 5.65 mg/dL (H)).    Results from last 7 days   Lab Units 07/03/22  0445 07/02/22  0334 07/01/22  0558   MAGNESIUM mg/dL 2.6* 2.6* 1.9             Results from last 7 days   Lab Units 07/04/22  0646 07/03/22 0445 07/02/22  0334 07/01/22  0548 06/30/22  0716   WBC 10*3/mm3 8.81 10.15 11.53* 12.12* 11.78*   HEMOGLOBIN g/dL 9.7* 9.6* 9.4* 9.5* 10.0*   PLATELETS 10*3/mm3 302 337 259 347 265               Imaging Results (Last 24 Hours)     ** No results found for the last 24 hours. **           MEDICATIONS    [START ON 7/5/2022] bumetanide, 2 mg, Oral, Once per day on Sun Tue Thu Sat  cefTRIAXone, 1 g, Intravenous, Q24H  epoetin hubert/hubert-epbx, 6,000 Units, Intravenous, Once per day on Mon Wed Fri  gabapentin, 300 mg, Nasogastric, Daily  guaiFENesin, 1,200 mg, Oral, Q12H  Insulin Aspart, 0-9 Units, Subcutaneous, TID AC  Insulin Aspart, 8 Units, Subcutaneous, 4x Daily  insulin detemir, 25 Units, Subcutaneous, Q12H  isosorbide mononitrate, 30 mg, Oral, Q24H  levothyroxine sodium, 18.75 mcg, Intravenous, Daily  Linezolid, 600 mg, Intravenous, Q12H  lisinopril, 20 mg, Oral, Once per day on Sun Tue Thu Sat  metoprolol tartrate, 25 mg, Oral, Q12H  nystatin, , Topical, Q12H  pantoprazole, 40 mg, Intravenous, Q24H  Scopolamine, 1 patch, Transdermal, Q72H  senna-docusate sodium, 2 tablet, Oral, BID  sodium chloride, 10 mL, Intravenous, Q12H  sodium chloride, 10 mL, Intravenous, Q12H  sodium chloride, 10 mL, Intravenous, Q12H      potassium chloride, 10 mEq, Last Rate: Stopped (06/26/22 0130)  potassium chloride, 10 mEq, Last Rate: Stopped (06/26/22 0212)        Assessment & Plan   ASSESSMENT / PLAN      Ventilator associated pneumonia (HCC)     Hypertension    COPD (chronic obstructive pulmonary disease) (HCC)    Coronary artery disease involving native coronary artery of native heart without angina pectoris    Hypothyroidism    Acute cystitis with hematuria    Type 2 diabetes mellitus with autonomic neuropathy (HCC)    ESRD on hemodialysis (HCC)    Accidental drug overdose    Acute respiratory failure with hypoxia and hypercapnia (HCC)    Anemia    Positive fecal occult blood test    1.ESRD dependent on hemodialysis since October 2021.  Has tunnel catheter. Patient is on dialysis MWF at [facility]. Lost weight from 297 to 260 lbs and now on bumex po non dialysis days. Hd today 3 L UF is planned     2. Anemia chronic kidney disease.  Patient had extensive work-up in the past by Dr. Munoz. She also has B12 deficiency.  She has history of AVM.  She received 2 units of packed RBCs. Now on epogen 6000 units. hgb between 9-10     3. htn - runs low with hd. Lisinopril on non dialysis days. Observe bp post dialysis     4. Possible PNA/Chronic Resp Failure related to COPD-now extubated 7/1/22. She is MRSA screen positive.Patient is on home oxygen and trilogy when sleeping.  On zyvox. On pureed diet and may remove NG tube today      5. Possible OD with flexeril- rec'd activated charcoal per g tube     6. DM- Glucose was 605 on arrival- management per primary team.      7.coronary artery disease     8.chronic kidney disease/mineral and bone disorder on sevelamer                 This document has been electronically signed by Ace Lauren MD on July 4, 2022 10:25 CDT

## 2022-07-04 NOTE — THERAPY TREATMENT NOTE
Acute Care - Physical Therapy Treatment Note  [facility]     Patient Name: Yamileth Slater  : 1960  MRN: [id number]  Today's Date: 2022      Visit Dx:     ICD-10-CM ICD-9-CM   1. Altered mental status, unspecified altered mental status type  R41.82 780.97   2. Acute respiratory failure, unspecified whether with hypoxia or hypercapnia ([facility])  J96.00 518.81   3. Acute pulmonary edema ([facility])  J81.0 518.4   4. ESRD (end stage renal disease) ([facility])  N18.6 585.6   5. Hyperglycemia  R73.9 790.29   6. Pharyngeal dysphagia  R13.13 787.23   7. Impaired functional mobility, balance, gait, and endurance  Z74.09 V49.89   8. Impaired mobility and activities of daily living  Z74.09 V49.89    Z78.9      Patient Active Problem List   Diagnosis   • Chronic midline low back pain without sciatica   • Vitamin D deficiency   • Tobacco dependence syndrome   • Shoulder joint pain   • Peripheral vascular disease ([facility])   • Morbid obesity with BMI of 50.0-59.9, adult ([facility])   • Mixed hyperlipidemia   • Kidney stone   • History of colon polyps   • GERD (gastroesophageal reflux disease)   • Excoriated eczema   • Hypertension   • COPD (chronic obstructive pulmonary disease) ([facility])   • Chronic folliculitis   • Coronary artery disease involving native coronary artery of native heart without angina pectoris   • 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   • Hypothyroidism   • Carotid artery disease ([facility])   • [place] abuse   • PAD (peripheral artery disease) ([facility])   • Venous insufficiency   • LAFB (left anterior fascicular block)   • Pulmonary hypertension ([facility])   • Left hip pain   • Neck pain   • MIKE and COPD overlap syndrome ([facility])   • Pneumonia due to COVID-19 virus   • Hyperkalemia   • Acute cystitis with hematuria   • Cutaneous candidiasis   • Community acquired pneumonia of right middle lobe of lung   • MRSA infection   • Esophageal dysphagia   • Monilial esophagitis ([facility])   • NSTEMI, initial episode of care  ([facility])   • Cellulitis of left leg   • Urinary tract infection due to Proteus   • History of insertion of tunneled central venous catheter (CVC) with port   • Anemia due to chronic kidney disease, on chronic dialysis ([facility])   • Pneumonitis   • Personal history of noncompliance with medical treatment, presenting hazards to health   • Type 2 diabetes mellitus with autonomic neuropathy ([facility])   • Physical deconditioning   • ESRD on hemodialysis ([facility])   • DVT prophylaxis   • Accidental drug overdose   • Acute respiratory failure with hypoxia and hypercapnia ([facility])   • Anemia   • Ventilator associated pneumonia ([facility])   • Positive fecal occult blood test     Past Medical History:   Diagnosis Date   • Acute blood loss anemia 4/16/2017    Likely due to gastric oozing at this time. - Dr. Duarte (GI) was consulted and has now signed off, will follow up outpatient - pill colonoscopy showed AVMs - continue to monitor   • Acute cystitis with hematuria 3/31/2021    1/13: IV Rocephin 1 gm q 24 1/14 : transitioned  to omnicef 300mg. Urine cultures resulted and did not show growth. Omnicef discontinued as patient is asymptomatic   • Altered mental status 1/9/2022    - AMS on presentation - initial ABG pH 7.3, CO2 34 - Procal 0.29 - UA negative for acute cysitits -CTA head wnl  - Empiric Zosyn and Vancomycin -Lactate 2.5 on admission  - blood cultures no growth at 24 hours     • Anxiety    • CAD (coronary artery disease) 4/24/2021    S/P 3 stents 5/1/2021 for BHL Continue ASA 81mg & Clopidogrel 75mg Continue Atorvastatin 40mg   • Carotid artery stenosis    • Chronic obstructive lung disease ([facility])    • CKD (chronic kidney disease) stage 4, GFR 15-29 ml/min ([facility])    • CKD (chronic kidney disease), symptom management only, stage 5 ([facility]) 10/5/2020    Results from last 7 days Lab Units 12/15/21 0548 12/14/21 1323 12/14/21 0916 CREATININE mg/dL 3.92* 3.21* 3.32*  Baseline creatinine 2-3 GFR 13-25 GFR 15 Dialysis MWF, sees Dr. Lauren Nephrology  consult,, appreciate recommendations Continue Bumex 1mg bid daily Holding Bumex 2mg 4 times a week   • Colonic polyp    • Coronary arteriosclerosis    • Diabetes mellitus (HCC)    • Diabetic neuropathy (HCC)    • Ear pain, right 10/18/2021    - canal trauma due to patient scratching and DMT2 - added cortisporin ear drops   • Elevated troponin 10/12/2021    -most likely from CKD -Trending down -Neg chest pain   • Generalized abdominal pain 7/1/2022    Could be due to initiation of tube feeds vs dyspepsia vs abdominal cramps related to no PO intake due to intubation vs constipation Continue current laxative regiment  If no bowel movement by this afternoon will consider enema   • GERD (gastroesophageal reflux disease)    • GI bleed 5/13/2021    - GI will follow up outpatient - Protonix 40mg daily - Avoid medical DVT prophy and use mechanical at this time instead. - Continue to monitor - pill colonoscopy results showed AVMs   • History of transfusion    • Hypercholesterolemia    • Hyperosmolar hyperglycemic state (HHS) ([facility]) 6/25/2022    Serum glucose 605 on admission  Anion gap 16 PH 7.37 Bicarb 27.9 Continue fluids  Insulin drip with HHS protocol  Anion gap closed around 10 AM, received one dose of Levamir subq, will stop insulin drip after 2 hrs     • Hypertension    • Hypokalemia 5/27/2022    Will replace as needed. Will be cautious in the setting of ESRD to avoid need for emergency dialysis   Monitor Qtc intervals on EKG     • Hypomagnesemia 6/27/2021    Monitor and replace   • Morbid obesity (HCC)    • Nephrolithiasis    • On mechanically assisted ventilation (HCC) 6/26/2022    Vent management and sedation orders placed.  - [facility] intensivist group consulted for vent management appreciate recommendations  - plan to extubate today     • Peripheral vascular disease (HCC)    • Pleural effusion on right 6/26/2022    CXR on 6/30/22 read as a small upper left pulmonary edema vs early pneumonia.  Last Echo 1/2022  EF 61-65 % Continue to monitor  Procal slightly improved, CRP improved On Linezolid and meropenum      • SIRS (systemic inflammatory response syndrome) ([facility]) 1/9/2022    Admission  - WBC 17.78   -   - RR 16 - 1/10: VSS/wnl - CXR - Mild pulm edema - Blood cultures no growth at 24 hours  - Procalcitonin 0.29 - UA : glucose 1000, negative Leucocytes/nitrate - Empiric Zosyn and Vancomcyin    • Sleep apnea    • Substance abuse ([facility])    • Vitamin D deficiency      Past Surgical History:   Procedure Laterality Date   • CARDIAC CATHETERIZATION N/A 7/14/2020   • CARDIAC CATHETERIZATION N/A 4/23/2021    Procedure: Left Heart Cath;  Surgeon: Melba Romo MD;  Location: [facility] CATH INVASIVE LOCATION;  Service: Cardiology;  Laterality: N/A;   • CARDIAC CATHETERIZATION N/A 4/30/2021    Procedure: Percutaneous Coronary Intervention;  Surgeon: Russell Voss MD;  Location: [facility] CATH INVASIVE LOCATION;  Service: Cardiovascular;  Laterality: N/A;   • CARDIAC CATHETERIZATION N/A 4/30/2021    Procedure: Stent NIKKI coronary;  Surgeon: Russell Voss MD;  Location: [facility] CATH INVASIVE LOCATION;  Service: Cardiovascular;  Laterality: N/A;   • CARDIAC CATHETERIZATION Left 11/13/2021    Procedure: Left Heart Cath;  Surgeon: Niall Rios MD;  Location: [facility] CATH INVASIVE LOCATION;  Service: Cardiology;  Laterality: Left;   • CAROTID STENT Left    • COLONOSCOPY     • COLONOSCOPY N/A 5/14/2021    Procedure: COLONOSCOPY;  Surgeon: Mingo Duarte MD;  Location: [facility] ENDOSCOPY;  Service: Gastroenterology;  Laterality: N/A;   • CORONARY ARTERY BYPASS GRAFT N/A 2013    CABG X 3   • CYSTOSCOPY BLADDER STONE LITHOTRIPSY Bilateral    • ENDOSCOPY N/A 4/12/2021    Procedure: ESOPHAGOGASTRODUODENOSCOPY;  Surgeon: Mingo Duarte MD;  Location: [facility] ENDOSCOPY;  Service: Gastroenterology;  Laterality: N/A;   • ENDOSCOPY N/A 5/14/2021    Procedure: ESOPHAGOGASTRODUODENOSCOPY;  Surgeon: Mingo Duarte MD;   Location: [facility] ENDOSCOPY;  Service: Gastroenterology;  Laterality: N/A;   • ENDOSCOPY N/A 1/28/2022    Procedure: ESOPHAGOGASTRODUODENOSCOPY;  Surgeon: Mingo Duarte MD;  Location: [facility] ENDOSCOPY;  Service: Gastroenterology;  Laterality: N/A;   • INTERVENTIONAL RADIOLOGY PROCEDURE N/A 10/21/2021    Procedure: tunneled central venous catheter placement;  Surgeon: Donnie Robles MD;  Location: [facility] ANGIO INVASIVE LOCATION;  Service: Interventional Radiology;  Laterality: N/A;   • INTERVENTIONAL RADIOLOGY PROCEDURE N/A 1/27/2022    Procedure: tunneled central venous catheter placement;  Surgeon: Donnie Robles MD;  Location: [facility] ANGIO INVASIVE LOCATION;  Service: Interventional Radiology;  Laterality: N/A;     PT Assessment (last 12 hours)     PT Evaluation and Treatment     Row Name 07/04/22 1510          Physical Therapy Time and Intention    Document Type therapy note (daily note)  -EM     Patient Effort poor  -EM     Row Name 07/04/22 1510          Pain    Pretreatment Pain Rating 0/10 - no pain  -EM     Posttreatment Pain Rating 0/10 - no pain  -EM     Row Name 07/04/22 1510          Cognition    Affect/Mental Status (Cognition) confused  -EM     Orientation Status (Cognition) oriented to;person;disoriented to;place;situation;time  -EM     Follows Commands (Cognition) does not follow one-step commands  -EM     Personal Safety Interventions fall prevention program maintained;gait belt;supervised activity;nonskid shoes/slippers when out of bed  -EM     Row Name 07/04/22 1510          Bed Mobility    Rolling Left [place] (Bed Mobility) maximum assist (25% patient effort);dependent (less than 25% patient effort);1 person assist  -EM     Rolling Right [place] (Bed Mobility) maximum assist (25% patient effort);dependent (less than 25% patient effort);1 person assist  -EM     Scooting/Bridging [place] (Bed Mobility) dependent (less than 25% patient effort)  x3  -EM      "Sit-Supine [place] (Bed Mobility) dependent (less than 25% patient effort);1 person assist  -EM     Supine-Sit-Supine [place] (Bed Mobility) maximum assist (25% patient effort);1 person assist  -EM     Assistive Device (Bed Mobility) head of bed elevated;bed rails  -EM     Comment, (Bed Mobility) Pt refused to return to supine stating \"im leaving\" req her to dependently be put back to bed x2. Pt sat EOB ~20' with SBAx1.  -EM     Row Name 07/04/22 1510          Transfers    Sit-Stand [place] (Transfers) maximum assist (25% patient effort);1 person assist  x 4 attempts to t/f to bedside commode.  -EM     Stand-Sit [place] (Transfers) maximum assist (25% patient effort);1 person assist  -EM     Row Name 07/04/22 1510          Sit-Stand Transfer    Assistive Device (Sit-Stand Transfers) walker, front-wheeled  -EM     Row Name 07/04/22 1510          Stand-Sit Transfer    Assistive Device (Stand-Sit Transfers) walker, front-wheeled  -EM     Row Name 07/04/22 1510          Gait/Stairs (Locomotion)    [place] Level (Gait) minimum assist (75% patient effort);1 person assist  -EM     Assistive Device (Gait) walker, front-wheeled  -EM     Distance in Feet (Gait) 2' sidestepping at  EOB  -EM     Comment, (Gait/Stairs) Gt deferred by PTA due to pt not following safety commands with standing. Pt attempted to stand multiple attempts wanting to leave causing safety concerns if t/f to chair therefore deferred by PTA. Attempted B LE therex but unsuccessful due to pt not comprehending/following directions.  -EM     Row Name             Wound 06/25/22 2100 Left lower leg    Wound - Properties Group Placement Date: 06/25/22  - Placement Time: 2100 -KH Present on Hospital Admission: Y  -KH Side: Left  -KH Orientation: lower  -KH Location: leg  -KH     Retired Wound - Properties Group Placement Date: 06/25/22  - Placement Time: 2100 -KH Present on Hospital Admission: Y  -KH Side: Left  -KH Orientation: lower "  -KH Location: leg  -KH     Retired Wound - Properties Group Date first assessed: 06/25/22  -KH Time first assessed: 2100 -KH Present on Hospital Admission: Y  -KH Side: Left  -KH Location: leg  -KH     Row Name             Wound 07/01/22 2000 Left anterior groin MASD (Moisture associated skin damage)    Wound - Properties Group Placement Date: 07/01/22  -LS Placement Time: 2000 -LS Side: Left  -LS Orientation: anterior  -LS Location: groin  -LS Primary Wound Type: MASD  -LS     Retired Wound - Properties Group Placement Date: 07/01/22  -LS Placement Time: 2000 -LS Side: Left  -LS Orientation: anterior  -LS Location: groin  -LS Primary Wound Type: MASD  -LS     Retired Wound - Properties Group Date first assessed: 07/01/22  -LS Time first assessed: 2000 -LS Side: Left  -LS Location: groin  -LS Primary Wound Type: MASD  -LS     Row Name             Wound 07/01/22 2000 Right anterior groin MASD (Moisture associated skin damage)    Wound - Properties Group Placement Date: 07/01/22  -LS Placement Time: 2000 -LS Side: Right  -LS Orientation: anterior  -LS Location: groin  -LS Primary Wound Type: MASD  -LS     Retired Wound - Properties Group Placement Date: 07/01/22  -LS Placement Time: 2000 -LS Side: Right  -LS Orientation: anterior  -LS Location: groin  -LS Primary Wound Type: MASD  -LS     Retired Wound - Properties Group Date first assessed: 07/01/22 -LS Time first assessed: 2000 -LS Side: Right  -LS Location: groin  -LS Primary Wound Type: MASD  -LS     Row Name             Wound Left heel Other (comment)    Wound - Properties Group Side: Left  -AK Location: heel  -AK Primary Wound Type: Other  -AK     Retired Wound - Properties Group Side: Left  -AK Location: heel  -AK Primary Wound Type: Other  -AK     Retired Wound - Properties Group Side: Left  -AK Location: heel  -AK Primary Wound Type: Other  -AK     Row Name 07/04/22 1510          Positioning and Restraints    Pre-Treatment Position in bed  [facility]      Post Treatment Position bed  -EM     In Bed with nsg;encouraged to call for assist;call light within reach;exit alarm on  -EM     Row Name 07/04/22 1510          Bed Mobility Goal 1 (PT)    Activity/Assistive Device (Bed Mobility Goal 1, PT) bed mobility activities, all  -EM     [place] Level/Cues Needed (Bed Mobility Goal 1, PT) modified independence  -EM     Time Frame (Bed Mobility Goal 1, PT) 10 days  -EM     Progress/Outcomes (Bed Mobility Goal 1, PT) goal not met  -EM     Row Name 07/04/22 1510          Transfer Goal 1 (PT)    Activity/Assistive Device (Transfer Goal 1, PT) bed-to-chair/chair-to-bed;toilet  -EM     [place] Level/Cues Needed (Transfer Goal 1, PT) modified independence  -EM     Time Frame (Transfer Goal 1, PT) 2 weeks;3 weeks  -EM     Progress/Outcome (Transfer Goal 1, PT) goal not met  -EM     Row Name 07/04/22 1510          Gait Training Goal 1 (PT)    Activity/Assistive Device (Gait Training Goal 1, PT) gait (walking locomotion);assistive device use  -EM     [place] Level (Gait Training Goal 1, PT) modified independence  -EM     Distance (Gait Training Goal 1, PT) 70 ft per trip or more  -EM     Time Frame (Gait Training Goal 1, PT) 3 weeks  -EM     Progress/Outcome (Gait Training Goal 1, PT) goal not met  -EM           User Key  (r) = Recorded By, (t) = Taken By, (c) = Cosigned By    Initials Name Provider Type    EM Nadeem Patricia, PTA Physical Therapist Assistant    Aminata Navas, RN Registered Nurse    Caroline Pandey RN Registered Nurse    Isatu Odonnell RN Registered Nurse                Physical Therapy Education                 Title: PT OT SLP Therapies (In Progress)     Topic: Physical Therapy (In Progress)     Point: Mobility training (In Progress)     Learning Progress Summary           Patient Acceptance, E, NR by JUANCARLOS at 7/2/2022 1623    Comment: POC                   Point: Home exercise program (In Progress)     Learning Progress Summary            Patient Acceptance, E, NR by  at 7/2/2022 1623    Comment: POC                   Point: Body mechanics (In Progress)     Learning Progress Summary           Patient Acceptance, E, NR by  at 7/2/2022 1623    Comment: POC                   Point: Precautions (In Progress)     Learning Progress Summary           Patient Acceptance, E, NR by  at 7/2/2022 1623    Comment: POC                               User Key     Initials Effective Dates Name Provider Type Discipline     06/16/21 -  Gauri Anderson, PT Physical Therapist PT              PT Recommendation and Plan  Anticipated Discharge Disposition (PT): skilled nursing facility  Plan of Care Reviewed With: patient  Progress: improving  Outcome Evaluation: Pt requesting to nsg to get up  upon entry. Pt confused and slightly aggitated (non aggressive) this date. Pt t/f sup-sit with Max Ax1. Pt sat EOB ~20' with SBAx1.Pt t/f sit-stand-sit with Max Ax1 x multiple attempts to stand pivot t/f to bedside commode which was deferred by signee due to safety concerns with pt not following commands. Attempted therex unsuccessfully for B LE due to pt unable to comprehend instructions. Pt declined to return to supine due to wanting to leave. PT/nsg returned pt to supine with dep Ax2. Pt was soiled and voided in bed and nsg and PTA rolled pt x1 each directon wit Max/Dep Ax1 for pericare, gown change, and bed linen change. Dep X3 to scoot to HOB. Pt took 2 sidestepps at EOB with Min Ax1. No goals met this tx.   Outcome Measures     Row Name 07/03/22 4430             How much help from another is currently needed...    Putting on and taking off regular lower body clothing? 2  -RB      Bathing (including washing, rinsing, and drying) 2  -RB      Toileting (which includes using toilet bed pan or urinal) 2  -RB      Putting on and taking off regular upper body clothing 3  -RB      Taking care of personal grooming (such as brushing teeth) 3  -RB      Eating meals 4   -RB      AM-PAC 6 Clicks Score (OT) 16  -RB              Functional Assessment    Outcome Measure Options AM-PAC 6 Clicks Daily Activity (OT)  -RB            User Key  (r) = Recorded By, (t) = Taken By, (c) = Cosigned By    Initials Name Provider Type    RB Toney Mantilla, OT Occupational Therapist                 Time Calculation:    PT Charges     Row Name 07/04/22 1550             Time Calculation    Start Time 1448  -EM      Stop Time 1531  -EM      Time Calculation (min) 43 min  -EM              Time Calculation- PT    Total Timed Code Minutes- PT 43 minute(s)  -EM            User Key  (r) = Recorded By, (t) = Taken By, (c) = Cosigned By    Initials Name Provider Type    EM Nadeem Patricia PTA Physical Therapist Assistant              Therapy Charges for Today     Code Description Service Date Service Provider Modifiers Qty    [id number] HC PT THERAPEUTIC ACT EA 15 MIN 7/4/2022 Nadeem Patricia PTA GP, CQ 3          PT G-Codes  Outcome Measure Options: AM-PAC 6 Clicks Daily Activity (OT)  AM-PAC 6 Clicks Score (PT): 7  AM-PAC 6 Clicks Score (OT): 16    Nadeem Patricia PTA  7/4/2022

## 2022-07-04 NOTE — PROGRESS NOTES
SUBJECTIVE:   7/3/2022  Chief Complaint:     Subjective      Patient feels better today.  Denied abdominal pain, nausea or vomiting.  Hemoglobin is 9.6.  Ambulating with PT.  Tolerating Dobbhoff feeds.    History:  Past Medical History:   Diagnosis Date   • Acute blood loss anemia 4/16/2017    Likely due to gastric oozing at this time. - Dr. Duarte (GI) was consulted and has now signed off, will follow up outpatient - pill colonoscopy showed AVMs - continue to monitor   • Acute cystitis with hematuria 3/31/2021    1/13: IV Rocephin 1 gm q 24 1/14 : transitioned  to omnicef 300mg. Urine cultures resulted and did not show growth. Omnicef discontinued as patient is asymptomatic   • Altered mental status 1/9/2022    - AMS on presentation - initial ABG pH 7.3, CO2 34 - Procal 0.29 - UA negative for acute cysitits -CTA head wnl  - Empiric Zosyn and Vancomycin -Lactate 2.5 on admission  - blood cultures no growth at 24 hours     • Anxiety    • CAD (coronary artery disease) 4/24/2021    S/P 3 stents 5/1/2021 for [facility] Continue ASA 81mg & Clopidogrel 75mg Continue Atorvastatin 40mg   • Carotid artery stenosis    • Chronic obstructive lung disease (HCC)    • CKD (chronic kidney disease) stage 4, GFR 15-29 ml/min ([facility])    • CKD (chronic kidney disease), symptom management only, stage 5 (HCC) 10/5/2020    Results from last 7 days Lab Units 12/15/21 0548 12/14/21 1323 12/14/21 0916 CREATININE mg/dL 3.92* 3.21* 3.32*  Baseline creatinine 2-3 GFR 13-25 GFR 15 Dialysis MWF, sees Dr. Lauren Nephrology consult,, appreciate recommendations Continue Bumex 1mg bid daily Holding Bumex 2mg 4 times a week   • Colonic polyp    • Coronary arteriosclerosis    • Diabetes mellitus (HCC)    • Diabetic neuropathy (HCC)    • Ear pain, right 10/18/2021    - canal trauma due to patient scratching and DMT2 - added cortisporin ear drops   • Elevated troponin 10/12/2021    -most likely from CKD -Trending down -Neg chest pain   • Generalized  abdominal pain 7/1/2022    Could be due to initiation of tube feeds vs dyspepsia vs abdominal cramps related to no PO intake due to intubation vs constipation Continue current laxative regiment  If no bowel movement by this afternoon will consider enema   • GERD (gastroesophageal reflux disease)    • GI bleed 5/13/2021    - GI will follow up outpatient - Protonix 40mg daily - Avoid medical DVT prophy and use mechanical at this time instead. - Continue to monitor - pill colonoscopy results showed AVMs   • History of transfusion    • Hypercholesterolemia    • Hyperosmolar hyperglycemic state (HHS) ([facility]) 6/25/2022    Serum glucose 605 on admission  Anion gap 16 PH 7.37 Bicarb 27.9 Continue fluids  Insulin drip with [facility] protocol  Anion gap closed around 10 AM, received one dose of Levamir subq, will stop insulin drip after 2 hrs     • Hypertension    • Hypokalemia 5/27/2022    Will replace as needed. Will be cautious in the setting of ESRD to avoid need for emergency dialysis   Monitor Qtc intervals on EKG     • Hypomagnesemia 6/27/2021    Monitor and replace   • Morbid obesity ([facility])    • Nephrolithiasis    • On mechanically assisted ventilation ([facility]) 6/26/2022    Vent management and sedation orders placed.  - [facility] intensivist group consulted for vent management appreciate recommendations  - plan to extubate today     • Peripheral vascular disease ([facility])    • Pleural effusion on right 6/26/2022    CXR on 6/30/22 read as a small upper left pulmonary edema vs early pneumonia.  Last Echo 1/2022 EF 61-65 % Continue to monitor  Procal slightly improved, CRP improved On Linezolid and meropenum      • SIRS (systemic inflammatory response syndrome) ([facility]) 1/9/2022    Admission  - WBC 17.78   -   - RR 16 - 1/10: VSS/wnl - CXR - Mild pulm edema - Blood cultures no growth at 24 hours  - Procalcitonin 0.29 - UA : glucose 1000, negative Leucocytes/nitrate - Empiric Zosyn and Vancomcyin    • Sleep apnea    • Substance  abuse (HCC)    • Vitamin D deficiency      Past Surgical History:   Procedure Laterality Date   • CARDIAC CATHETERIZATION N/A 7/14/2020   • CARDIAC CATHETERIZATION N/A 4/23/2021    Procedure: Left Heart Cath;  Surgeon: Melba Romo MD;  Location: [facility] CATH INVASIVE LOCATION;  Service: Cardiology;  Laterality: N/A;   • CARDIAC CATHETERIZATION N/A 4/30/2021    Procedure: Percutaneous Coronary Intervention;  Surgeon: Russell Voss MD;  Location: [facility] CATH INVASIVE LOCATION;  Service: Cardiovascular;  Laterality: N/A;   • CARDIAC CATHETERIZATION N/A 4/30/2021    Procedure: Stent NIKKI coronary;  Surgeon: Russell Voss MD;  Location: [facility] CATH INVASIVE LOCATION;  Service: Cardiovascular;  Laterality: N/A;   • CARDIAC CATHETERIZATION Left 11/13/2021    Procedure: Left Heart Cath;  Surgeon: Niall Rios MD;  Location: [facility] CATH INVASIVE LOCATION;  Service: Cardiology;  Laterality: Left;   • CAROTID STENT Left    • COLONOSCOPY     • COLONOSCOPY N/A 5/14/2021    Procedure: COLONOSCOPY;  Surgeon: Mingo Duarte MD;  Location: [facility] ENDOSCOPY;  Service: Gastroenterology;  Laterality: N/A;   • CORONARY ARTERY BYPASS GRAFT N/A 2013    CABG X 3   • CYSTOSCOPY BLADDER STONE LITHOTRIPSY Bilateral    • ENDOSCOPY N/A 4/12/2021    Procedure: ESOPHAGOGASTRODUODENOSCOPY;  Surgeon: Mingo Duarte MD;  Location: [facility] ENDOSCOPY;  Service: Gastroenterology;  Laterality: N/A;   • ENDOSCOPY N/A 5/14/2021    Procedure: ESOPHAGOGASTRODUODENOSCOPY;  Surgeon: Mingo Duarte MD;  Location: [facility] ENDOSCOPY;  Service: Gastroenterology;  Laterality: N/A;   • ENDOSCOPY N/A 1/28/2022    Procedure: ESOPHAGOGASTRODUODENOSCOPY;  Surgeon: Mingo Duarte MD;  Location: [facility] ENDOSCOPY;  Service: Gastroenterology;  Laterality: N/A;   • INTERVENTIONAL RADIOLOGY PROCEDURE N/A 10/21/2021    Procedure: tunneled central venous catheter placement;  Surgeon: Donnie Robles MD;  Location: [facility] ANGIO  INVASIVE LOCATION;  Service: Interventional Radiology;  Laterality: N/A;   • INTERVENTIONAL RADIOLOGY PROCEDURE N/A 2022    Procedure: tunneled central venous catheter placement;  Surgeon: Donnie Robles MD;  Location: [facility] INVASIVE LOCATION;  Service: Interventional Radiology;  Laterality: N/A;     Family History   Problem Relation Age of Onset   • Heart disease Mother    • Lung cancer Mother    • Heart disease Father    • Heart attack Father    • Diabetes Father    • Heart disease Half-Sister         Dad's side   • Heart disease Brother    • No Known Problems Sister    • No Known Problems Sister    • No Known Problems Sister    • No Known Problems Sister    • No Known Problems Sister    • Pancreatic cancer Half-Sister         Dad's side   • No Known Problems Brother    • No Known Problems Brother    • Heart attack Half-Brother    • Heart attack Half-Brother    • No Known Problems Maternal Grandmother    • No Known Problems Maternal Grandfather    • No Known Problems Paternal Grandmother    • No Known Problems Paternal Grandfather      Social History     Tobacco Use   • Smoking status: Former Smoker     Packs/day: 0.25     Years: 46.00     Pack years: 11.50     Types: Cigarettes     Start date: 1999     Quit date: 2/15/2022     Years since quittin.3   • Smokeless tobacco: Never Used   • Tobacco comment: only smoking 5 a day - quit 2021   Substance Use Topics   • Alcohol use: No   • Drug use: Not Currently     Types: LSD, Marijuana, Methamphetamines     Medications Prior to Admission   Medication Sig Dispense Refill Last Dose   • acetaminophen (Tylenol 8 Hour) 650 MG 8 hr tablet Take 1 tablet by mouth Every 8 (Eight) Hours As Needed for Mild Pain . 90 tablet 0    • albuterol (PROVENTIL) (2.5 MG/3ML) 0.083% nebulizer solution Inhale the contents of 1 vial by nebulization Every 4 (Four) Hours As Needed for Wheezing. 75 mL 3    • albuterol sulfate  (90 Base) MCG/ACT inhaler  "Inhale 2 puffs Every 4 (Four) Hours As Needed for Wheezing. 18 g 1    • aspirin (aspirin) 81 MG EC tablet Take 1 tablet by mouth Daily. 60 tablet 5    • atorvastatin (LIPITOR) 20 MG tablet Take 1 tablet by mouth every night at bedtime. 90 tablet 0    • Blood Glucose Monitoring Suppl ([employer] Blood Glucose Meter) w/Device kit 1 each 3 (Three) Times a Day. 1 kit 3    • bumetanide (BUMEX) 2 MG tablet Take 1 tablet by mouth 4 (Four) Times a Week. Take on non-hemodialysis days. 16 tablet 0    • Capsaicin 0.035 % cream Apply 3 g topically 3 (Three) Times a Day As Needed (ankle pain). 42.5 g 2    • Cetirizine HCl 10 MG capsule Take 1 capsule by mouth Daily.      • clopidogrel (PLAVIX) 75 MG tablet Take 1 tablet by mouth Daily. 30 tablet 5    • Continuous Blood Gluc  (FreeStyle Jade 2 [place]) device 1 each Continuous. 1 each 0    • Continuous Blood Gluc Sensor (FreeStyle Jade 2 Sensor) misc 1 each Every 14 (Fourteen) Days. 2 each 11    • cyclobenzaprine (FLEXERIL) 5 MG tablet Take 2 tablets by mouth At Night As Needed for Muscle Spasms. 90 tablet 0    • Diclofenac Sodium (VOLTAREN) 1 % gel gel Apply 4 g topically to the appropriate area as directed 4 (Four) Times a Day As Needed (Ankle pain). 350 g 2    • DULoxetine (CYMBALTA) 20 MG capsule Take 2 capsules by mouth Daily for 90 days. 180 capsule 0    • Easy Touch Insulin Syringe 30G X 5/16\" 0.5 ML misc USE AS DIRECTED WITH LEVEMIR      • EASY TOUCH PEN NEEDLES 31G X 8 MM misc       • gabapentin (NEURONTIN) 300 MG capsule Take 1 capsule by mouth Daily. And an extra pill M/W/F - dialysis days 45 capsule 2    • glucose blood test strip Use as instructed 100 each 12    • insulin detemir (LEVEMIR) 100 UNIT/ML injection Inject 25 Units under the skin into the appropriate area as directed Every Night. 3 mL 12    • Insulin Lispro, 1 Unit Dial, (HUMALOG) 100 UNIT/ML solution pen-injector Inject 12 Units under the skin into the appropriate area as directed 3 (Three) Times a Day " With Meals. 30 mL 12    • Insulin Pen Needle (NovoFine) 30G X 8 MM misc As directed 4 times daily 100 each 11    • Insulin Pen Needle 31G X 8 MM misc Use to inject insulin 4 (Four) Times a Day as directed. 120 each 12    • ipratropium-albuterol (DUO-NEB) 0.5-2.5 mg/3 ml nebulizer Take 3 mL by nebulization 4 (Four) Times a Day.      • isosorbide mononitrate (IMDUR) 30 MG 24 hr tablet Take 1 tablet by mouth Every Morning. 90 tablet 1    • levothyroxine (SYNTHROID, LEVOTHROID) 25 MCG tablet Take 25 mcg by mouth Daily.      • lisinopril (PRINIVIL,ZESTRIL) 20 MG tablet Take 1 tablet by mouth Daily. 90 tablet 0    • Methoxy PEG-Epoetin Beta (MIRCERA IJ) 150 mcg Every 14 (Fourteen) Days.      • Methoxy PEG-Epoetin Beta (MIRCERA IJ) 225 mcg Every 14 (Fourteen) Days.      • metoprolol tartrate (LOPRESSOR) 25 MG tablet Take 1 tablet by mouth Every 12 (Twelve) Hours. 180 tablet 0    • montelukast (SINGULAIR) 10 MG tablet Take 1 tablet by mouth Every Night. 90 tablet 0    • muscle rub (BenGay) 10-15 % cream cream Apply 1 application topically to the appropriate area as directed 4 (Four) Times a Day As Needed for buttock pain. 85 g 1    • nitroglycerin (NITROSTAT) 0.4 MG SL tablet Place 0.4 mg under the tongue Every 5 (Five) Minutes As Needed for Chest Pain (x 3 doses).      • O2 (OXYGEN) Inhale 3 L/min Continuous.      • ondansetron ODT (Zofran ODT) 4 MG disintegrating tablet Place 1 tablet on the tongue Every 8 (Eight) Hours As Needed for Nausea or Vomiting. 30 tablet 0    • oxybutynin XL (DITROPAN-XL) 5 MG 24 hr tablet Take 1 tablet by mouth Daily. 90 tablet 0    • pantoprazole (PROTONIX) 40 MG EC tablet Take 1 tablet by mouth Every Night. 90 tablet 0    • promethazine (PHENERGAN) 25 MG suppository Insert  into the rectum Every 6 (Six) Hours.      • ranolazine (RANEXA) 500 MG 12 hr tablet Take 1 tablet by mouth Every 12 (Twelve) Hours. 180 tablet 0    • Semaglutide (Rybelsus) 7 MG tablet Take 7 mg by mouth Daily. 90 tablet 0     • sevelamer (RENVELA) 800 MG tablet Take 800 mg by mouth 3 (Three) Times a Day With Meals.      • sodium bicarbonate 650 MG tablet Take 1 tablet by mouth.        Allergies:  Adhesive tape, Latex, Nsaids, and Other     CURRENT MEDICATIONS/OBJECTIVE/VS/PE:     Current Medications:     Current Facility-Administered Medications   Medication Dose Route Frequency Provider Last Rate Last Admin   • acetaminophen (TYLENOL) tablet 500 mg  500 mg Oral Q6H PRN Perez Hooker MD   500 mg at 07/03/22 1613   • sennosides-docusate (PERICOLACE) 8.6-50 MG per tablet 2 tablet  2 tablet Oral BID Charlene Castro MD   2 tablet at 07/03/22 2010    And   • polyethylene glycol (MIRALAX) packet 17 g  17 g Oral Daily PRN Charlene Castro MD   17 g at 07/01/22 1249    And   • bisacodyl (DULCOLAX) EC tablet 5 mg  5 mg Oral Daily PRN Charlene Castro MD   5 mg at 06/29/22 1715    And   • bisacodyl (DULCOLAX) suppository 10 mg  10 mg Rectal Daily PRN Charlene Castro MD       • [START ON 7/5/2022] bumetanide (BUMEX) tablet 2 mg  2 mg Oral Once per day on Sun Tue Thu Sat Ace Lauren MD       • cefTRIAXone (ROCEPHIN) 1 g/100 mL 0.9% NS (MBP)  1 g Intravenous Q24H Perze Hooker MD   1 g at 07/03/22 0907   • dextrose (D50W) (25 g/50 mL) IV injection 10-50 mL  10-50 mL Intravenous Q15 Min PRN Charlene Castro MD       • dextrose (D50W) (25 g/50 mL) IV injection 25 g  25 g Intravenous Q15 Min PRN Jung Leonard MD       • dextrose (GLUTOSE) oral gel 15 g  15 g Oral Q15 Min PRN Jung Leonard MD       • [START ON 7/4/2022] epoetin hubert (EPOGEN,PROCRIT) injection 6,000 Units  6,000 Units Intravenous Once per day on Mon Wed Fri Ace Lauren MD       • gabapentin (NEURONTIN) 50 mg/mL solution 300 mg  300 mg Oral Daily Jerman Coffman MD   300 mg at 07/03/22 0804   • glucagon (human recombinant) (GLUCAGEN DIAGNOSTIC) injection 1 mg  1 mg Intramuscular Q15 Min PRN Jung Leonard MD       • guaiFENesin (ROBITUSSIN) 100  MG/5ML oral solution 400 mg  400 mg Oral Q6H PRN Jerman Coffman MD       • heparin (porcine) injection 4,000 Units  4,000 Units Intracatheter PRN Ace Lauren MD       • hydrALAZINE (APRESOLINE) injection 10 mg  10 mg Intravenous Q6H PRN Jung Leonard MD   10 mg at 07/01/22 0034   • Insulin Aspart (novoLOG) injection 0-7 Units  0-7 Units Subcutaneous TID AC Jung Leonard MD   3 Units at 07/03/22 1650   • Insulin Aspart (novoLOG) injection 8 Units  8 Units Subcutaneous 4x Daily Perez Hooker MD   8 Units at 07/03/22 2010   • [START ON 7/4/2022] insulin detemir (LEVEMIR) injection 15 Units  15 Units Subcutaneous Daily Perez Hooker MD       • insulin detemir (LEVEMIR) injection 20 Units  20 Units Subcutaneous Nightly Perez Hooker MD   20 Units at 07/03/22 2010   • isosorbide mononitrate (IMDUR) 24 hr tablet 30 mg  30 mg Oral Q24H Jung Leonard MD   30 mg at 07/03/22 0803   • levothyroxine sodium injection 18.75 mcg  18.75 mcg Intravenous Daily Jerman Coffman MD   18.75 mcg at 07/03/22 1056   • Linezolid (ZYVOX) 600 mg 300 mL  600 mg Intravenous Q12H Jung Leonard  mL/hr at 07/03/22 0954 600 mg at 07/03/22 0954   • lisinopril (PRINIVIL,ZESTRIL) tablet 20 mg  20 mg Oral Once per day on Sun Tue Thu Sat Jung Leonard MD   20 mg at 07/03/22 0803   • magnesium sulfate 4 gram infusion - Mg less than or equal to 1mg/dL  4 g Intravenous PRN Charlene Castro MD        Or   • magnesium sulfate 3 gram infusion (1gm x 3) - Mg 1.1 - 1.5 mg/dL  1 g Intravenous PRN Charlene Castro MD        Or   • Magnesium Sulfate 2 gram infusion- Mg 1.6 - 1.9 mg/dL  2 g Intravenous PRN Charlene Castro MD 25 mL/hr at 07/01/22 1617 2 g at 07/01/22 1617   • metoprolol tartrate (LOPRESSOR) injection 5 mg  5 mg Intravenous Q5 Min PRN Froylan Lu MD   5 mg at 06/26/22 1921   • metoprolol tartrate (LOPRESSOR) tablet 25 mg  25 mg Oral Q12H Froylan Lu MD   25 mg at 07/03/22 2010   • naloxone  (NARCAN) injection 0.4 mg  0.4 mg Intravenous Q5 Min PRN Charlene Castro MD       • nystatin (MYCOSTATIN) powder   Topical Q12H Perez Hooker MD   1 application at 07/03/22 2013   • pantoprazole (PROTONIX) injection 40 mg  40 mg Intravenous Q24H Charlene Castro MD   40 mg at 07/03/22 0803   • potassium chloride (MICRO-K) CR capsule 40 mEq  40 mEq Oral PRN Huy Santa MD        Or   • potassium chloride (KLOR-CON) packet 40 mEq  40 mEq Oral PRN Huy Santa MD   40 mEq at 07/02/22 1210    Or   • potassium chloride 10 mEq in 100 mL IVPB  10 mEq Intravenous Q1H PRN Huy Santa MD       • potassium chloride 10 mEq in 100 mL IVPB  10 mEq Intravenous Continuous PRN Charlene Castro MD   Stopped at 06/26/22 0130   • potassium chloride 10 mEq in 100 mL IVPB  10 mEq Intravenous Continuous PRN Charlene Castro MD   Stopped at 06/26/22 0212   • scopolamine patch 1 mg/72 hr  1 patch Transdermal Q72H Jerman Coffman MD   1 patch at 07/02/22 1111   • sodium chloride 0.9 % flush 10 mL  10 mL Intravenous Q12H Jerman Coffman MD   10 mL at 07/03/22 2013   • sodium chloride 0.9 % flush 10 mL  10 mL Intravenous Q12H Jerman Coffman MD   10 mL at 07/03/22 2013   • sodium chloride 0.9 % flush 10 mL  10 mL Intravenous Q12H Jerman Coffman MD   10 mL at 07/03/22 2013   • sodium chloride 0.9 % flush 10 mL  10 mL Intravenous PRN Jerman Coffman MD       • sodium chloride 0.9 % flush 20 mL  20 mL Intravenous PRN Jerman Coffman MD           Objective     Review of Systems:   Review of Systems   Constitutional: Negative for chills, fatigue, fever and unexpected weight change.   HENT: Negative for congestion, ear discharge, hearing loss, nosebleeds and sore throat.    Eyes: Negative for pain, discharge and redness.   Respiratory: Negative for cough, chest tightness, shortness of breath and wheezing.    Cardiovascular: Negative for chest pain and palpitations.   Gastrointestinal: Negative for abdominal distention,  abdominal pain, blood in stool, constipation, diarrhea, nausea and vomiting.   Endocrine: Negative for cold intolerance, polydipsia, polyphagia and polyuria.   Genitourinary: Negative for dysuria, flank pain, frequency, hematuria and urgency.   Musculoskeletal: Negative for arthralgias, back pain, joint swelling and myalgias.   Skin: Negative for color change, pallor and rash.   Neurological: Negative for tremors, seizures, syncope, weakness and headaches.   Hematological: Negative for adenopathy. Does not bruise/bleed easily.   Psychiatric/Behavioral: Negative for behavioral problems, confusion, dysphoric mood, hallucinations and suicidal ideas. The patient is not nervous/anxious.        Physical Exam:   Temp:  [97 °F (36.1 °C)-98.6 °F (37 °C)] 97 °F (36.1 °C)  Heart Rate:  [64-81] 78  Resp:  [18] 18  BP: ()/(48-71) 122/54     Physical Exam:  General Appearance:    Alert, cooperative, in no acute distress   Head:    Normocephalic, without obvious abnormality, atraumatic   Eyes:            Lids and lashes normal, conjunctivae and sclerae normal, no   icterus, no pallor, corneas clear, PERRLA   Ears:    Ears appear intact with no abnormalities noted   Throat:   No oral lesions, no thrush, oral mucosa moist   Neck:   No adenopathy, supple, trachea midline, no thyromegaly, no     carotid bruit, no JVD   Back:     No kyphosis present, no scoliosis present, no skin lesions,       erythema or scars, no tenderness to percussion or                   palpation,   range of motion normal   Lungs:     Clear to auscultation,respirations regular, even and                   unlabored    Heart:    Regular rhythm and normal rate, normal S1 and S2, no            murmur, no gallop, no rub, no click   Breast Exam:    Deferred   Abdomen:     Normal bowel sounds, no masses, no organomegaly, soft        nontender, nondistended, no guarding, no rebound                 tenderness   Genitalia:    Deferred   Extremities:   Moves all  extremities well, no edema, no cyanosis, no              redness   Pulses:   Pulses palpable and equal bilaterally   Skin:   No bleeding, bruising or rash   Lymph nodes:   No palpable adenopathy   Neurologic:   Cranial nerves 2 - 12 grossly intact, sensation intact, DTR        present and equal bilaterally      Results Review:     Lab Results (last 24 hours)     Procedure Component Value Units Date/Time    POC Glucose Once [[id number]]  (Abnormal) Collected: 07/03/22 1904    Specimen: Blood Updated: 07/03/22 2000     Glucose 259 mg/dL      Comment: RN NotifiedOperator: [id number] GALAN JENNIFERMeter ID: [id number]       POC Glucose Once [[id number]]  (Abnormal) Collected: 07/03/22 1626    Specimen: Blood Updated: 07/03/22 1643     Glucose 244 mg/dL      Comment: RN NotifiedOperator: [id number] MOSQUERA MELISSAMeter ID: [id number]       POC Glucose Once [[id number]]  (Abnormal) Collected: 07/03/22 1057    Specimen: Blood Updated: 07/03/22 1114     Glucose 371 mg/dL      Comment: Sliding Scale AdminOperator: [id number] KNOT ASHLEYMeter ID: [id number]       Blood Culture - Blood, Blood, Central Line [[id number]]  (Normal) Collected: 06/30/22 0746    Specimen: Blood, Central Line Updated: 07/03/22 0803     Blood Culture No growth at 3 days    Blood Culture - Blood, Arm, Right [[id number]]  (Normal) Collected: 06/30/22 0746    Specimen: Blood from Arm, Right Updated: 07/03/22 0802     Blood Culture No growth at 3 days    Urine Culture - Urine, Urine, Clean Catch [[id number]]  (Abnormal) Collected: 06/30/22 1607    Specimen: Urine, Clean Catch Updated: 07/03/22 0745     Urine Culture Yeast isolated     Comment: No further workup.       Narrative:      Colonization of the urinary tract without infection is common. Treatment is discouraged unless the patient is symptomatic, pregnant, or undergoing an invasive urologic procedure.    Urine Culture - Urine, Urine, Clean Catch [[id number]]  (Normal) Collected: 07/02/22 1418     Specimen: Urine, Clean Catch Updated: 07/03/22 0713     Urine Culture Culture in progress    Comprehensive Metabolic Panel [[id number]]  (Abnormal) Collected: 07/03/22 0445    Specimen: Blood Updated: 07/03/22 0520     Glucose 328 mg/dL      BUN 42 mg/dL      Creatinine 4.35 mg/dL      Sodium 128 mmol/L      Potassium 4.1 mmol/L      Chloride 90 mmol/L      CO2 25.0 mmol/L      Calcium 9.8 mg/dL      Total Protein 7.6 g/dL      Albumin 3.40 g/dL      ALT (SGPT) <5 U/L      AST (SGOT) 10 U/L      Alkaline Phosphatase 181 U/L      Total Bilirubin 0.2 mg/dL      Globulin 4.2 gm/dL      A/G Ratio 0.8 g/dL      BUN/Creatinine Ratio 9.7     Anion Gap 13.0 mmol/L      eGFR 10.9 mL/min/1.73      Comment: <15 Indicative of kidney failure       Narrative:      GFR Normal >60  Chronic Kidney Disease <60  Kidney Failure <15      Magnesium [[id number]]  (Abnormal) Collected: 07/03/22 0445    Specimen: Blood Updated: 07/03/22 0519     Magnesium 2.6 mg/dL     CBC Auto Differential [[id number]]  (Abnormal) Collected: 07/03/22 0445    Specimen: Blood Updated: 07/03/22 0501     WBC 10.15 10*3/mm3      RBC 3.63 10*6/mm3      Hemoglobin 9.6 g/dL      Hematocrit 31.4 %      MCV 86.5 fL      MCH 26.4 pg      MCHC 30.6 g/dL      RDW 16.7 %      RDW-SD 52.9 fl      MPV 8.8 fL      Platelets 337 10*3/mm3      Neutrophil % 60.5 %      Lymphocyte % 21.7 %      Monocyte % 9.1 %      Eosinophil % 4.8 %      Basophil % 1.1 %      Immature Grans % 2.8 %      Neutrophils, Absolute 6.15 10*3/mm3      Lymphocytes, Absolute 2.20 10*3/mm3      Monocytes, Absolute 0.92 10*3/mm3      Eosinophils, Absolute 0.49 10*3/mm3      Basophils, Absolute 0.11 10*3/mm3      Immature Grans, Absolute 0.28 10*3/mm3      nRBC 0.0 /100 WBC            I reviewed the patient's new clinical results.  I reviewed the patient's new imaging results and agree with the interpretation.     ASSESSMENT/PLAN:   ASSESSMENT: 1.  Anemia with heme positive stool, likely due to GI blood  loss.  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  Hemoglobin is stable.  2.  Pneumonia, on antibiotics  3.  History of Flexeril overdosage  4.  End-stage renal disease on hemodialysis  5.  Oropharyngeal dysphagia, speech pathology evaluation noted.  PLAN:  1.  Continue PPI and current supportive care  2.  Follow H&H closely and transfuse as needed  3.  Endoscopic evaluation if hemoglobin drops.  4.  PEG tube placement if dysphagia continues  The risks, benefits, and alternatives of this procedure have been discussed with the patient or the responsible party- the patient understands and agrees to proceed.         Mingo Duarte MD  07/03/22  21:22 CDT

## 2022-07-04 NOTE — PLAN OF CARE
Problem: Adult Inpatient Plan of Care  Goal: Plan of Care Review  Recent Flowsheet Documentation  Taken 7/4/2022 8303 by Mag Grier RN  Progress: improving  Plan of Care Reviewed With: patient  Outcome Evaluation: VSS throughout shift, pt on 2 L NC, pt diet advanced to pureed diet. pt tolerated dinner well. Tube feedings infusing nova source @ 33 ml/hr via coretrack, dialysis in the am. will continue to monitor.

## 2022-07-04 NOTE — SIGNIFICANT NOTE
07/04/22 1035   OTHER   Discipline physical therapy assistant   Rehab Time/Intention   Session Not Performed patient unavailable for treatment  (Pt. recieving HD at this time.)

## 2022-07-04 NOTE — SIGNIFICANT NOTE
07/04/22 1349   OTHER   Discipline speech language pathologist   Rehab Time/Intention   Session Not Performed patient unavailable for treatment  (Second attempt for dysphagia tx; pt still in dialysis.)

## 2022-07-04 NOTE — PROGRESS NOTES
Adult Nutrition  Assessment    Patient Name:  Yamileth Slater  YOB: 1960  MRN: [id number]  Admit Date:  6/25/2022    Assessment Date:  7/4/2022    Comments:  Pt extubated and po diet has been started after SLP eval.  Tolerating pureed diet with Nectar Thick liquids with intake ~50%. Enteral nutrition to be discontinued.   Dialysis continues with hx of ESRD.  Still being managed for Ventilator Pneumonia; DM; Fecal Occult Blood test;  & Accidental Drug Overdose with Acute Resp Failure.  Gluc remains elevated despite an increase in insulin regimen.  Hyponatremia wioth Na+ 128.  RD will continue to monitor POC and po intake. Supplements will be adjusted as diet is advanced and she is no longer on [place] Thick Liquids. SLP continues to monitor & Evaluate     Reason for Assessment     Row Name 07/04/22 1450          Reason for Assessment    Reason For Assessment follow-up protocol                Nutrition/Diet History     Row Name 07/04/22 1458          Nutrition/Diet History    Typical Intake (Food/Fluid/EN/PN) Pt extubated.  She is alert.  She is wanting tube out.                  Labs/Tests/Procedures/Meds     Row Name 07/04/22 1501          Labs/Procedures/Meds    Lab Results Reviewed reviewed, pertinent            Diagnostic Tests/Procedures    Diagnostic Test/Procedure Reviewed reviewed, pertinent            Medications    Pertinent Medications Reviewed reviewed, pertinent                Physical Findings     Row Name 07/04/22 1501          Physical Findings    Overall Physical Appearance Pt alert off the vent                  Nutrition Prescription Ordered     Row Name 07/04/22 1501          Nutrition Prescription PO    Current PO Diet Dysphagia     Dysphagia Level 2  Pureed     Fluid Consistency Nectar/syrup thick            Nutrition Prescription EN    Enteral Route ND     Product Novasource Renal (Nepro)     TF Delivery Method Continuous     Continuous TF Goal Rate (mL/hr) 33 mL/hr      Continuous TF Current Rate (mL/hr) 33 mL/hr     Water flush (mL)  10 mL                Evaluation of Received Nutrient/Fluid Intake     Row Name 07/04/22 1502          Calories Evaluation    Total Calorie Target (kcal) 1500     Oral Calories (kcal) 0  50% x 2     Enteral Calories (kcal) 1584     Parenteral Calories (kcal) 0     Other Calories (kcal) 0     Total Calories (kcal) 1584     % of Kcal Needs 105.6            Protein Evaluation    Total Protein Target (g) 89     Oral Protein (g) 0     Enteral Protein (g) 72     Parenteral Protein (g) 0     Other Protein (g) 0     Total Protein (g) 72     % of Protein Needs 80.9            PO Evaluation    Number of Meals 2     % PO Intake 50% average                     Electronically signed by:  Gissel Littlejohn RD  07/04/22 15:11 CDT

## 2022-07-04 NOTE — PROGRESS NOTES
FAMILY MEDICINE RESIDENCY SERVICE  DAILY PROGRESS NOTE    NAME: Yamileth Slater  : 1960  MRN: [id number]      LOS: 9 days     PROVIDER OF SERVICE: Perez Hooker MD    Chief Complaint: Ventilator associated pneumonia (HCC)    Subjective:     Interval History:  History taken from: patient chart  No new complaints. NG tube still in place. Tolerating pureed foods. Some productive cough. No soa or chest pain.     Review of Systems:   Review of Systems   Constitutional: Negative for fever.   Respiratory: Positive for cough. Negative for shortness of breath.    Cardiovascular: Negative for chest pain and leg swelling.   Gastrointestinal: Negative for abdominal pain, nausea and vomiting.   Genitourinary: Negative for dysuria.   Neurological: Positive for weakness.       Objective:     Vital Signs  Temp:  [97 °F (36.1 °C)-98.6 °F (37 °C)] 97.8 °F (36.6 °C)  Heart Rate:  [64-82] 74  Resp:  [18] 18  BP: (108-173)/(52-72) 173/72  Flow (L/min):  [2] 2   Body mass index is 49.44 kg/m².    Physical Exam  Physical Exam  Vitals reviewed.   Constitutional:       Appearance: She is obese. She is ill-appearing.      Interventions: Nasal cannula in place.      Comments: 2L NC  NG Tube in left nostril   HENT:      Head: Normocephalic and atraumatic.   Eyes:      Conjunctiva/sclera: Conjunctivae normal.   Cardiovascular:      Rate and Rhythm: Normal rate and regular rhythm.      Pulses: Normal pulses.   Pulmonary:      Effort: Pulmonary effort is normal.      Breath sounds: No rhonchi or rales.   Abdominal:      Palpations: Abdomen is soft.      Tenderness: There is no abdominal tenderness.   Musculoskeletal:      Right lower leg: No edema.      Left lower leg: No edema.   Skin:     General: Skin is warm.   Neurological:      General: No focal deficit present.      Mental Status: She is alert.   Psychiatric:         Mood and Affect: Mood normal.         Behavior: Behavior normal.         Scheduled Meds   [START ON  2022] bumetanide, 2 mg, Oral, Once per day on Sun Tue Thu Sat  cefTRIAXone, 1 g, Intravenous, Q24H  epoetin hubert/hubert-epbx, 6,000 Units, Intravenous, Once per day on   gabapentin, 300 mg, Oral, Daily  Insulin Aspart, 0-9 Units, Subcutaneous, TID AC  Insulin Aspart, 8 Units, Subcutaneous, 4x Daily  insulin detemir, 25 Units, Subcutaneous, Q12H  isosorbide mononitrate, 30 mg, Oral, Q24H  levothyroxine sodium, 18.75 mcg, Intravenous, Daily  Linezolid, 600 mg, Intravenous, Q12H  lisinopril, 20 mg, Oral, Once per day on Sun Tue Thu Sat  metoprolol tartrate, 25 mg, Oral, Q12H  nystatin, , Topical, Q12H  pantoprazole, 40 mg, Intravenous, Q24H  Scopolamine, 1 patch, Transdermal, Q72H  senna-docusate sodium, 2 tablet, Oral, BID  sodium chloride, 10 mL, Intravenous, Q12H  sodium chloride, 10 mL, Intravenous, Q12H  sodium chloride, 10 mL, Intravenous, Q12H       PRN Meds   •  acetaminophen  •  albumin human  •  senna-docusate sodium **AND** polyethylene glycol **AND** bisacodyl **AND** bisacodyl  •  dextrose  •  dextrose  •  dextrose  •  dextrose  •  dextrose  •  glucagon (human recombinant)  •  glucagon (human recombinant)  •  guaiFENesin  •  heparin (porcine)  •  hydrALAZINE  •  magnesium sulfate **OR** magnesium sulfate in D5W 1g/100mL (PREMIX) **OR** magnesium sulfate  •  metoprolol tartrate  •  [] Morphine **AND** naloxone  •  potassium chloride **OR** potassium chloride **OR** potassium chloride  •  [DISCONTINUED] sodium chloride **AND** potassium chloride  •  [DISCONTINUED] sodium chloride **AND** potassium chloride  •  sodium chloride  •  sodium chloride      Diagnostic Data    Results from last 7 days   Lab Units 22  0646   WBC 10*3/mm3 8.81   HEMOGLOBIN g/dL 9.7*   HEMATOCRIT % 30.6*   PLATELETS 10*3/mm3 302   GLUCOSE mg/dL 383*   CREATININE mg/dL 5.65*   BUN mg/dL 60*   SODIUM mmol/L 128*   POTASSIUM mmol/L 4.2   AST (SGOT) U/L 10   ALT (SGPT) U/L <5   ALK PHOS U/L 198*   BILIRUBIN mg/dL  0.2   ANION GAP mmol/L 16.0*       No radiology results for the last day      I reviewed the patient's new clinical results.    Assessment/Plan:     Active Hospital Problems    Diagnosis  POA   • **Ventilator associated pneumonia (HCC) [J95.851]  Yes     Priority: High     Procal slightly improved   CRP improved, repeat q48 hrs  Extubated   No fever overnight  Blood cx negative   respiratory culture positive for MRSA  On linezolid      • Type 2 diabetes mellitus with autonomic neuropathy (HCC) [E11.43]  Yes     Priority: Medium     On moderate SSI  On levemir 25u bid  On 8u QID  Extubated, tolerating pureed feeds     • Positive fecal occult blood test [R19.5]  Unknown     Hgb stable  GI consulted and appreciate recs     • Accidental drug overdose [T50.901A]  Yes     Family reports of 10 tablets of 10 mg of Flexeril ingested.  - Overdose labs obtained including UDS, acetaminophen, salicylate, ethanol level  - Poison control notified they Recommend symptomatic care  - Monitor Qtc interval changes, improved       • Acute respiratory failure with hypoxia and hypercapnia (HCC) [J96.01, J96.02]  Yes   • Anemia [D64.9]  Yes     Hgb 6.9 on admission   - received 2u PRBC  - Type and screen   - Monitor H &H   -- H&H stable      • ESRD on hemodialysis (HCC) [N18.6, Z99.2]  Not Applicable     -Receives hemodialysis MWF at [facility] in [place]  Missed dialysis per family prior to arrival   Nephrology consulted appreciated recs          • Acute cystitis with hematuria [N30.01]  Unknown     Fever of 100.6 on 6/30  UA showed 3+ blood, 3+ leuk esterase, Toshia +4  Meropenum changed to ceftriaxone due to improvement  Ur culture pending, first culture showed yeast infection   If repeat culture shows no bacteria will d/c ceftriaxone and add diflucan     • Hypothyroidism [E03.9]  Yes     -home synthroid 25 mcg  -will start IV synthroid and give PO once extubated and tolerating PO intake     • Hypertension [I10]  Yes     On home  metoprolol and imdur  Lisinopril on non-HD days  BP stable  - PRN Hydralazine       • Coronary artery disease involving native coronary artery of native heart without angina pectoris [I25.10]  Yes     - S/P CABG, Bilateral carotid artery stenosis w/ 2 stents on left side,  PAD (peripheral artery disease) with stent in right upper leg  - Cardiology on board       • COPD (chronic obstructive pulmonary disease) ([facility]) [J44.9]  Yes     - will start home inhalers and trilogy at night once up on regular floor         DVT prophylaxis: SCDs/TEDs  Code status is   Code Status and Medical Interventions:   Ordered at: 06/25/22 2304     Level Of Support Discussed With:    Patient     Code Status (Patient has no pulse and is not breathing):    CPR (Attempt to Resuscitate)     Medical Interventions (Patient has pulse or is breathing):    Full Support       Plan for disposition:Where: home health and SNF and When:  2-3days      Time: 30 mins      This document has been electronically signed by Perez Hooker MD on July 4, 2022 08:54 CDT

## 2022-07-04 NOTE — PLAN OF CARE
Goal Outcome Evaluation:  Plan of Care Reviewed With: patient        Progress: improving  Outcome Evaluation: Pt requesting to nsg to get up  upon entry. Pt confused and slightly aggitated (non aggressive) this date. Pt t/f sup-sit with Max Ax1. Pt sat EOB ~20' with SBAx1.Pt t/f sit-stand-sit with Max Ax1 x multiple attempts to stand pivot t/f to bedside commode which was deferred by signee due to safety concerns with pt not following commands. Attempted therex unsuccessfully for B LE due to pt unable to comprehend instructions. Pt declined to return to supine due to wanting to leave. PT/nsg returned pt to supine with dep Ax2. Pt was soiled and voided in bed and nsg and PTA rolled pt x1 each directon wit Max/Dep Ax1 for pericare, gown change, and bed linen change. Dep X3 to scoot to HOB. Pt took 2 sidestepps at EOB with Min Ax1. No goals met this tx.

## 2022-07-05 PROBLEM — B37.49 YEAST UTI: Status: ACTIVE | Noted: 2022-07-05

## 2022-07-05 LAB
ALBUMIN SERPL-MCNC: 3.3 G/DL (ref 3.5–5.2)
ALBUMIN/GLOB SERPL: 0.7 G/DL
ALP SERPL-CCNC: 155 U/L (ref 39–117)
ALT SERPL W P-5'-P-CCNC: <5 U/L (ref 1–33)
ANION GAP SERPL CALCULATED.3IONS-SCNC: 13 MMOL/L (ref 5–15)
AST SERPL-CCNC: 10 U/L (ref 1–32)
BACTERIA SPEC AEROBE CULT: NORMAL
BACTERIA SPEC AEROBE CULT: NORMAL
BASOPHILS # BLD AUTO: 0.09 10*3/MM3 (ref 0–0.2)
BASOPHILS NFR BLD AUTO: 1 % (ref 0–1.5)
BILIRUB SERPL-MCNC: 0.2 MG/DL (ref 0–1.2)
BUN SERPL-MCNC: 30 MG/DL (ref 8–23)
BUN/CREAT SERPL: 8.1 (ref 7–25)
CALCIUM SPEC-SCNC: 9.7 MG/DL (ref 8.6–10.5)
CHLORIDE SERPL-SCNC: 92 MMOL/L (ref 98–107)
CO2 SERPL-SCNC: 27 MMOL/L (ref 22–29)
CREAT SERPL-MCNC: 3.69 MG/DL (ref 0.57–1)
DEPRECATED RDW RBC AUTO: 50.4 FL (ref 37–54)
EGFRCR SERPLBLD CKD-EPI 2021: 13.2 ML/MIN/1.73
EOSINOPHIL # BLD AUTO: 0.36 10*3/MM3 (ref 0–0.4)
EOSINOPHIL NFR BLD AUTO: 4 % (ref 0.3–6.2)
ERYTHROCYTE [DISTWIDTH] IN BLOOD BY AUTOMATED COUNT: 16.6 % (ref 12.3–15.4)
GLOBULIN UR ELPH-MCNC: 4.5 GM/DL
GLUCOSE BLDC GLUCOMTR-MCNC: 154 MG/DL (ref 70–130)
GLUCOSE BLDC GLUCOMTR-MCNC: 262 MG/DL (ref 70–130)
GLUCOSE BLDC GLUCOMTR-MCNC: 291 MG/DL (ref 70–130)
GLUCOSE BLDC GLUCOMTR-MCNC: 318 MG/DL (ref 70–130)
GLUCOSE SERPL-MCNC: 161 MG/DL (ref 65–99)
HCT VFR BLD AUTO: 31.3 % (ref 34–46.6)
HGB BLD-MCNC: 9.8 G/DL (ref 12–15.9)
IMM GRANULOCYTES # BLD AUTO: 0.15 10*3/MM3 (ref 0–0.05)
IMM GRANULOCYTES NFR BLD AUTO: 1.7 % (ref 0–0.5)
LYMPHOCYTES # BLD AUTO: 2 10*3/MM3 (ref 0.7–3.1)
LYMPHOCYTES NFR BLD AUTO: 22.3 % (ref 19.6–45.3)
MCH RBC QN AUTO: 26.6 PG (ref 26.6–33)
MCHC RBC AUTO-ENTMCNC: 31.3 G/DL (ref 31.5–35.7)
MCV RBC AUTO: 84.8 FL (ref 79–97)
MONOCYTES # BLD AUTO: 0.79 10*3/MM3 (ref 0.1–0.9)
MONOCYTES NFR BLD AUTO: 8.8 % (ref 5–12)
NEUTROPHILS NFR BLD AUTO: 5.59 10*3/MM3 (ref 1.7–7)
NEUTROPHILS NFR BLD AUTO: 62.2 % (ref 42.7–76)
NRBC BLD AUTO-RTO: 0 /100 WBC (ref 0–0.2)
PLATELET # BLD AUTO: 276 10*3/MM3 (ref 140–450)
PMV BLD AUTO: 8.5 FL (ref 6–12)
POTASSIUM SERPL-SCNC: 3.5 MMOL/L (ref 3.5–5.2)
PROT SERPL-MCNC: 7.8 G/DL (ref 6–8.5)
RBC # BLD AUTO: 3.69 10*6/MM3 (ref 3.77–5.28)
SODIUM SERPL-SCNC: 132 MMOL/L (ref 136–145)
WBC NRBC COR # BLD: 8.98 10*3/MM3 (ref 3.4–10.8)

## 2022-07-05 PROCEDURE — 63710000001 INSULIN ASPART PER 5 UNITS: Performed by: STUDENT IN AN ORGANIZED HEALTH CARE EDUCATION/TRAINING PROGRAM

## 2022-07-05 PROCEDURE — 85025 COMPLETE CBC W/AUTO DIFF WBC: CPT | Performed by: STUDENT IN AN ORGANIZED HEALTH CARE EDUCATION/TRAINING PROGRAM

## 2022-07-05 PROCEDURE — 97530 THERAPEUTIC ACTIVITIES: CPT

## 2022-07-05 PROCEDURE — 80053 COMPREHEN METABOLIC PANEL: CPT | Performed by: STUDENT IN AN ORGANIZED HEALTH CARE EDUCATION/TRAINING PROGRAM

## 2022-07-05 PROCEDURE — 99221 1ST HOSP IP/OBS SF/LOW 40: CPT | Performed by: PSYCHIATRY & NEUROLOGY

## 2022-07-05 PROCEDURE — 92526 ORAL FUNCTION THERAPY: CPT | Performed by: SPEECH-LANGUAGE PATHOLOGIST

## 2022-07-05 PROCEDURE — 99232 SBSQ HOSP IP/OBS MODERATE 35: CPT | Performed by: INTERNAL MEDICINE

## 2022-07-05 PROCEDURE — 25010000002 LINEZOLID 600 MG/300ML SOLUTION: Performed by: STUDENT IN AN ORGANIZED HEALTH CARE EDUCATION/TRAINING PROGRAM

## 2022-07-05 PROCEDURE — 97535 SELF CARE MNGMENT TRAINING: CPT

## 2022-07-05 PROCEDURE — 97110 THERAPEUTIC EXERCISES: CPT

## 2022-07-05 PROCEDURE — 82962 GLUCOSE BLOOD TEST: CPT

## 2022-07-05 PROCEDURE — 99232 SBSQ HOSP IP/OBS MODERATE 35: CPT | Performed by: STUDENT IN AN ORGANIZED HEALTH CARE EDUCATION/TRAINING PROGRAM

## 2022-07-05 PROCEDURE — 63710000001 INSULIN DETEMIR PER 5 UNITS: Performed by: STUDENT IN AN ORGANIZED HEALTH CARE EDUCATION/TRAINING PROGRAM

## 2022-07-05 RX ORDER — LEVOTHYROXINE SODIUM 0.03 MG/1
25 TABLET ORAL
Status: DISCONTINUED | OUTPATIENT
Start: 2022-07-05 | End: 2022-07-07 | Stop reason: HOSPADM

## 2022-07-05 RX ORDER — LINEZOLID 600 MG/1
600 TABLET, FILM COATED ORAL EVERY 12 HOURS SCHEDULED
Status: DISCONTINUED | OUTPATIENT
Start: 2022-07-05 | End: 2022-07-07 | Stop reason: HOSPADM

## 2022-07-05 RX ADMIN — Medication 10 ML: at 08:43

## 2022-07-05 RX ADMIN — GUAIFENESIN 1200 MG: 600 TABLET, EXTENDED RELEASE ORAL at 20:51

## 2022-07-05 RX ADMIN — GABAPENTIN 300 MG: 300 CAPSULE ORAL at 08:40

## 2022-07-05 RX ADMIN — Medication 10 ML: at 08:40

## 2022-07-05 RX ADMIN — INSULIN DETEMIR 25 UNITS: 100 INJECTION, SOLUTION SUBCUTANEOUS at 10:47

## 2022-07-05 RX ADMIN — INSULIN ASPART 8 UNITS: 100 INJECTION, SOLUTION INTRAVENOUS; SUBCUTANEOUS at 11:02

## 2022-07-05 RX ADMIN — PANTOPRAZOLE SODIUM 40 MG: 40 INJECTION, POWDER, FOR SOLUTION INTRAVENOUS at 08:42

## 2022-07-05 RX ADMIN — LINEZOLID 600 MG: 600 INJECTION, SOLUTION INTRAVENOUS at 02:04

## 2022-07-05 RX ADMIN — LINEZOLID 600 MG: 600 TABLET, FILM COATED ORAL at 20:51

## 2022-07-05 RX ADMIN — Medication 10 ML: at 20:53

## 2022-07-05 RX ADMIN — BUMETANIDE 2 MG: 1 TABLET ORAL at 08:40

## 2022-07-05 RX ADMIN — INSULIN DETEMIR 25 UNITS: 100 INJECTION, SOLUTION SUBCUTANEOUS at 20:53

## 2022-07-05 RX ADMIN — INSULIN ASPART 6 UNITS: 100 INJECTION, SOLUTION INTRAVENOUS; SUBCUTANEOUS at 10:47

## 2022-07-05 RX ADMIN — LISINOPRIL 20 MG: 20 TABLET ORAL at 08:40

## 2022-07-05 RX ADMIN — INSULIN ASPART 6 UNITS: 100 INJECTION, SOLUTION INTRAVENOUS; SUBCUTANEOUS at 16:50

## 2022-07-05 RX ADMIN — INSULIN ASPART 8 UNITS: 100 INJECTION, SOLUTION INTRAVENOUS; SUBCUTANEOUS at 08:42

## 2022-07-05 RX ADMIN — GUAIFENESIN 1200 MG: 600 TABLET, EXTENDED RELEASE ORAL at 08:40

## 2022-07-05 RX ADMIN — LEVOTHYROXINE SODIUM 25 MCG: 25 TABLET ORAL at 08:40

## 2022-07-05 RX ADMIN — LINEZOLID 600 MG: 600 TABLET, FILM COATED ORAL at 11:39

## 2022-07-05 RX ADMIN — INSULIN ASPART 2 UNITS: 100 INJECTION, SOLUTION INTRAVENOUS; SUBCUTANEOUS at 08:41

## 2022-07-05 RX ADMIN — ISOSORBIDE MONONITRATE 30 MG: 30 TABLET, EXTENDED RELEASE ORAL at 08:46

## 2022-07-05 RX ADMIN — FLUCONAZOLE 100 MG: 100 TABLET ORAL at 08:39

## 2022-07-05 RX ADMIN — NYSTATIN: 100000 POWDER TOPICAL at 08:41

## 2022-07-05 RX ADMIN — INSULIN ASPART 8 UNITS: 100 INJECTION, SOLUTION INTRAVENOUS; SUBCUTANEOUS at 17:22

## 2022-07-05 RX ADMIN — METOPROLOL TARTRATE 25 MG: 25 TABLET, FILM COATED ORAL at 08:40

## 2022-07-05 RX ADMIN — INSULIN ASPART 8 UNITS: 100 INJECTION, SOLUTION INTRAVENOUS; SUBCUTANEOUS at 20:54

## 2022-07-05 RX ADMIN — METOPROLOL TARTRATE 25 MG: 25 TABLET, FILM COATED ORAL at 20:51

## 2022-07-05 RX ADMIN — NYSTATIN: 100000 POWDER TOPICAL at 20:52

## 2022-07-05 NOTE — CONSULTS
"Inpatient Consult to Psychiatry    7/5/2022    Referring Provider: Dr Perez Hooker  Reason for Consultation: possible suicide attempt    Source of History:  chart review and the patient    HPI:    Patient is a 62 y.o. female who presents with possible suicide attempt. Onset of symptoms was abrupt starting unknown ago.  Symptoms have been present on an denies any suicidal intentions basis. Symptoms are associated with medical illness.  Symptoms are aggravated by problems with health.   Symptoms improve with pt denies attempt.  Patient's symptom severity is mild.   Patient reports that level of hopefulness is 8/10.  Patient's symptoms occur in the context of possible accidental overdose    Pt is seen on medical services. She reports that about 2 weeks ago she asked her  to hand her the planner with medications in it. She states that she thought she opened the correct one and took her normal 14 pills until  looked at planner and asked her when she was going to take medications, she stated she already had, that's when they discovered she took around 10 flexeril tablets that she had in the bottom orozco for prn use.      Pt's  called EMS.   Pt has been in ICU since until appx 3 days ago when she was moved to med/surg floor.  Pt is seen sitting in chair, she is alert/oriented She is pleasant and logical in conversation.  Pt states that this was an accident and that she does not have any suicidal thoughts. She reports that she didn't feel well that day.   Pt states she has had depression at times, speaks about a dog she had for 15 years passed away.  She states she has another dog now, Jozef, and he is like a \"therapy\" animal for pt.      Pt has been on Cymbalta in the past, denies need for it at this time.  She reports having much support with family.          Psychiatric Review Of Systems:  anxiety and depression    History  Past psychiatric history: depression    Psychiatric Hospitalizations: " Patient has had no prior hospitalizations.    Suicide Attempts: Patient has had no prior suicide attempts.    Prior Treatment and Medications Tried: Cymbalta    History of violence or legal issues: The patient has no significant history of legal issues.    Social History:    Social History     Socioeconomic History   • Marital status:      Spouse name: wesley dumont   Tobacco Use   • Smoking status: Former Smoker     Packs/day: 0.25     Years: 46.00     Pack years: 11.50     Types: Cigarettes     Start date: 1999     Quit date: 2/15/2022     Years since quittin.3   • Smokeless tobacco: Never Used   • Tobacco comment: only smoking 5 a day - quit 2021   Substance and Sexual Activity   • Alcohol use: No   • Drug use: Not Currently     Types: LSD, Marijuana, Methamphetamines   • Sexual activity: Not Currently       Abuse/Trauma: History of physical abuse: no, History of sexual abuse: no and History of verbal/emotional abuse: no      Family History:    Family History   Problem Relation Age of Onset   • Heart disease Mother    • Lung cancer Mother    • Heart disease Father    • Heart attack Father    • Diabetes Father    • Heart disease Half-Sister         Dad's side   • Heart disease Brother    • No Known Problems Sister    • No Known Problems Sister    • No Known Problems Sister    • No Known Problems Sister    • No Known Problems Sister    • Pancreatic cancer Half-Sister         Dad's side   • No Known Problems Brother    • No Known Problems Brother    • Heart attack Half-Brother    • Heart attack Half-Brother    • No Known Problems Maternal Grandmother    • No Known Problems Maternal Grandfather    • No Known Problems Paternal Grandmother    • No Known Problems Paternal Grandfather        Further details: denies family hx of suicide    Past Medical and Surgical History:    Past Medical History:   Diagnosis Date   • Acute blood loss anemia 2017    Likely due to gastric oozing at this time. -  Dr. Duarte (GI) was consulted and has now signed off, will follow up outpatient - pill colonoscopy showed AVMs - continue to monitor   • Acute cystitis with hematuria 3/31/2021    1/13: IV Rocephin 1 gm q 24 1/14 : transitioned  to omnicef 300mg. Urine cultures resulted and did not show growth. Omnicef discontinued as patient is asymptomatic   • Altered mental status 1/9/2022    - AMS on presentation - initial ABG pH 7.3, CO2 34 - Procal 0.29 - UA negative for acute cysitits -CTA head wnl  - Empiric Zosyn and Vancomycin -Lactate 2.5 on admission  - blood cultures no growth at 24 hours     • Anxiety    • CAD (coronary artery disease) 4/24/2021    S/P 3 stents 5/1/2021 for [facility] Continue ASA 81mg & Clopidogrel 75mg Continue Atorvastatin 40mg   • Carotid artery stenosis    • Chronic obstructive lung disease (HCC)    • CKD (chronic kidney disease) stage 4, GFR 15-29 ml/min ([facility])    • CKD (chronic kidney disease), symptom management only, stage 5 ([facility]) 10/5/2020    Results from last 7 days Lab Units 12/15/21 0548 12/14/21 1323 12/14/21 0916 CREATININE mg/dL 3.92* 3.21* 3.32*  Baseline creatinine 2-3 GFR 13-25 GFR 15 Dialysis MWF, sees Dr. Lauren Nephrology consult,, appreciate recommendations Continue Bumex 1mg bid daily Holding Bumex 2mg 4 times a week   • Colonic polyp    • Coronary arteriosclerosis    • Diabetes mellitus (HCC)    • Diabetic neuropathy (HCC)    • Ear pain, right 10/18/2021    - canal trauma due to patient scratching and DMT2 - added cortisporin ear drops   • Elevated troponin 10/12/2021    -most likely from CKD -Trending down -Neg chest pain   • Generalized abdominal pain 7/1/2022    Could be due to initiation of tube feeds vs dyspepsia vs abdominal cramps related to no PO intake due to intubation vs constipation Continue current laxative regiment  If no bowel movement by this afternoon will consider enema   • GERD (gastroesophageal reflux disease)    • GI bleed 5/13/2021    - GI will follow up  outpatient - Protonix 40mg daily - Avoid medical DVT prophy and use mechanical at this time instead. - Continue to monitor - pill colonoscopy results showed AVMs   • History of transfusion    • Hypercholesterolemia    • Hyperosmolar hyperglycemic state (HHS) ([facility]) 6/25/2022    Serum glucose 605 on admission  Anion gap 16 PH 7.37 Bicarb 27.9 Continue fluids  Insulin drip with [facility] protocol  Anion gap closed around 10 AM, received one dose of Levamir subq, will stop insulin drip after 2 hrs     • Hypertension    • Hypokalemia 5/27/2022    Will replace as needed. Will be cautious in the setting of ESRD to avoid need for emergency dialysis   Monitor Qtc intervals on EKG     • Hypomagnesemia 6/27/2021    Monitor and replace   • Morbid obesity ([facility])    • Nephrolithiasis    • On mechanically assisted ventilation ([facility]) 6/26/2022    Vent management and sedation orders placed.  - [facility] intensivist group consulted for vent management appreciate recommendations  - plan to extubate today     • Peripheral vascular disease ([facility])    • Pleural effusion on right 6/26/2022    CXR on 6/30/22 read as a small upper left pulmonary edema vs early pneumonia.  Last Echo 1/2022 EF 61-65 % Continue to monitor  Procal slightly improved, CRP improved On Linezolid and meropenum      • SIRS (systemic inflammatory response syndrome) ([facility]) 1/9/2022    Admission  - WBC 17.78   -   - RR 16 - 1/10: VSS/wnl - CXR - Mild pulm edema - Blood cultures no growth at 24 hours  - Procalcitonin 0.29 - UA : glucose 1000, negative Leucocytes/nitrate - Empiric Zosyn and Vancomcyin    • Sleep apnea    • Substance abuse ([facility])    • Vitamin D deficiency      Past Surgical History:   Procedure Laterality Date   • CARDIAC CATHETERIZATION N/A 7/14/2020   • CARDIAC CATHETERIZATION N/A 4/23/2021    Procedure: Left Heart Cath;  Surgeon: Melba Romo MD;  Location: [facility] CATH INVASIVE LOCATION;  Service: Cardiology;  Laterality: N/A;   • CARDIAC  CATHETERIZATION N/A 4/30/2021    Procedure: Percutaneous Coronary Intervention;  Surgeon: Russell Voss MD;  Location: [facility] CATH INVASIVE LOCATION;  Service: Cardiovascular;  Laterality: N/A;   • CARDIAC CATHETERIZATION N/A 4/30/2021    Procedure: Stent NIKKI coronary;  Surgeon: Russell Voss MD;  Location: [facility] CATH INVASIVE LOCATION;  Service: Cardiovascular;  Laterality: N/A;   • CARDIAC CATHETERIZATION Left 11/13/2021    Procedure: Left Heart Cath;  Surgeon: Niall Rios MD;  Location: [facility] CATH INVASIVE LOCATION;  Service: Cardiology;  Laterality: Left;   • CAROTID STENT Left    • COLONOSCOPY     • COLONOSCOPY N/A 5/14/2021    Procedure: COLONOSCOPY;  Surgeon: Mingo Duarte MD;  Location: [facility] ENDOSCOPY;  Service: Gastroenterology;  Laterality: N/A;   • CORONARY ARTERY BYPASS GRAFT N/A 2013    CABG X 3   • CYSTOSCOPY BLADDER STONE LITHOTRIPSY Bilateral    • ENDOSCOPY N/A 4/12/2021    Procedure: ESOPHAGOGASTRODUODENOSCOPY;  Surgeon: Mingo Duarte MD;  Location: [facility] ENDOSCOPY;  Service: Gastroenterology;  Laterality: N/A;   • ENDOSCOPY N/A 5/14/2021    Procedure: ESOPHAGOGASTRODUODENOSCOPY;  Surgeon: Mingo Duarte MD;  Location: [facility] ENDOSCOPY;  Service: Gastroenterology;  Laterality: N/A;   • ENDOSCOPY N/A 1/28/2022    Procedure: ESOPHAGOGASTRODUODENOSCOPY;  Surgeon: Mingo Duarte MD;  Location: [facility] ENDOSCOPY;  Service: Gastroenterology;  Laterality: N/A;   • INTERVENTIONAL RADIOLOGY PROCEDURE N/A 10/21/2021    Procedure: tunneled central venous catheter placement;  Surgeon: Donnie Robles MD;  Location: [facility] ANGIO INVASIVE LOCATION;  Service: Interventional Radiology;  Laterality: N/A;   • INTERVENTIONAL RADIOLOGY PROCEDURE N/A 1/27/2022    Procedure: tunneled central venous catheter placement;  Surgeon: Donnie Robles MD;  Location: [facility] ANGIO INVASIVE LOCATION;  Service: Interventional Radiology;  Laterality: N/A;     Allergies:   "Adhesive tape, Latex, Nsaids, and Other  Medications Prior to Admission   Medication Sig Dispense Refill Last Dose   • acetaminophen (Tylenol 8 Hour) 650 MG 8 hr tablet Take 1 tablet by mouth Every 8 (Eight) Hours As Needed for Mild Pain . 90 tablet 0    • albuterol (PROVENTIL) (2.5 MG/3ML) 0.083% nebulizer solution Inhale the contents of 1 vial by nebulization Every 4 (Four) Hours As Needed for Wheezing. 75 mL 3    • albuterol sulfate  (90 Base) MCG/ACT inhaler Inhale 2 puffs Every 4 (Four) Hours As Needed for Wheezing. 18 g 1    • aspirin (aspirin) 81 MG EC tablet Take 1 tablet by mouth Daily. 60 tablet 5    • atorvastatin (LIPITOR) 20 MG tablet Take 1 tablet by mouth every night at bedtime. 90 tablet 0    • Blood Glucose Monitoring Suppl ([employer] Blood Glucose Meter) w/Device kit 1 each 3 (Three) Times a Day. 1 kit 3    • bumetanide (BUMEX) 2 MG tablet Take 1 tablet by mouth 4 (Four) Times a Week. Take on non-hemodialysis days. 16 tablet 0    • Capsaicin 0.035 % cream Apply 3 g topically 3 (Three) Times a Day As Needed (ankle pain). 42.5 g 2    • Cetirizine HCl 10 MG capsule Take 1 capsule by mouth Daily.      • clopidogrel (PLAVIX) 75 MG tablet Take 1 tablet by mouth Daily. 30 tablet 5    • Continuous Blood Gluc  (FreeStyle Jade 2 Carlock) device 1 each Continuous. 1 each 0    • Continuous Blood Gluc Sensor (FreeStyle Jade 2 Sensor) misc 1 each Every 14 (Fourteen) Days. 2 each 11    • cyclobenzaprine (FLEXERIL) 5 MG tablet Take 2 tablets by mouth At Night As Needed for Muscle Spasms. 90 tablet 0    • Diclofenac Sodium (VOLTAREN) 1 % gel gel Apply 4 g topically to the appropriate area as directed 4 (Four) Times a Day As Needed (Ankle pain). 350 g 2    • DULoxetine (CYMBALTA) 20 MG capsule Take 2 capsules by mouth Daily for 90 days. 180 capsule 0    • Easy Touch Insulin Syringe 30G X 5/16\" 0.5 ML misc USE AS DIRECTED WITH LEVEMIR      • EASY TOUCH PEN NEEDLES 31G X 8 MM misc       • gabapentin " (NEURONTIN) 300 MG capsule Take 1 capsule by mouth Daily. And an extra pill M/W/F - dialysis days 45 capsule 2    • glucose blood test strip Use as instructed 100 each 12    • insulin detemir (LEVEMIR) 100 UNIT/ML injection Inject 25 Units under the skin into the appropriate area as directed Every Night. 3 mL 12    • Insulin Lispro, 1 Unit Dial, (HUMALOG) 100 UNIT/ML solution pen-injector Inject 12 Units under the skin into the appropriate area as directed 3 (Three) Times a Day With Meals. 30 mL 12    • Insulin Pen Needle (NovoFine) 30G X 8 MM misc As directed 4 times daily 100 each 11    • Insulin Pen Needle 31G X 8 MM misc Use to inject insulin 4 (Four) Times a Day as directed. 120 each 12    • ipratropium-albuterol (DUO-NEB) 0.5-2.5 mg/3 ml nebulizer Take 3 mL by nebulization 4 (Four) Times a Day.      • isosorbide mononitrate (IMDUR) 30 MG 24 hr tablet Take 1 tablet by mouth Every Morning. 90 tablet 1    • levothyroxine (SYNTHROID, LEVOTHROID) 25 MCG tablet Take 25 mcg by mouth Daily.      • lisinopril (PRINIVIL,ZESTRIL) 20 MG tablet Take 1 tablet by mouth Daily. 90 tablet 0    • Methoxy PEG-Epoetin Beta (MIRCERA IJ) 150 mcg Every 14 (Fourteen) Days.      • Methoxy PEG-Epoetin Beta (MIRCERA IJ) 225 mcg Every 14 (Fourteen) Days.      • metoprolol tartrate (LOPRESSOR) 25 MG tablet Take 1 tablet by mouth Every 12 (Twelve) Hours. 180 tablet 0    • montelukast (SINGULAIR) 10 MG tablet Take 1 tablet by mouth Every Night. 90 tablet 0    • muscle rub (BenGay) 10-15 % cream cream Apply 1 application topically to the appropriate area as directed 4 (Four) Times a Day As Needed for buttock pain. 85 g 1    • nitroglycerin (NITROSTAT) 0.4 MG SL tablet Place 0.4 mg under the tongue Every 5 (Five) Minutes As Needed for Chest Pain (x 3 doses).      • O2 (OXYGEN) Inhale 3 L/min Continuous.      • ondansetron ODT (Zofran ODT) 4 MG disintegrating tablet Place 1 tablet on the tongue Every 8 (Eight) Hours As Needed for Nausea or  "Vomiting. 30 tablet 0    • oxybutynin XL (DITROPAN-XL) 5 MG 24 hr tablet Take 1 tablet by mouth Daily. 90 tablet 0    • pantoprazole (PROTONIX) 40 MG EC tablet Take 1 tablet by mouth Every Night. 90 tablet 0    • promethazine (PHENERGAN) 25 MG suppository Insert  into the rectum Every 6 (Six) Hours.      • ranolazine (RANEXA) 500 MG 12 hr tablet Take 1 tablet by mouth Every 12 (Twelve) Hours. 180 tablet 0    • Semaglutide (Rybelsus) 7 MG tablet Take 7 mg by mouth Daily. 90 tablet 0    • sevelamer (RENVELA) 800 MG tablet Take 800 mg by mouth 3 (Three) Times a Day With Meals.      • sodium bicarbonate 650 MG tablet Take 1 tablet by mouth.          Medical Review Of Systems:  Reviewed review of systems from  Dr Perez Hooker note from today.  Constitutional: Negative for chills and fever.   Respiratory: Positive for cough. Negative for shortness of breath.    Cardiovascular: Negative for chest pain and leg swelling.   Gastrointestinal: Negative for abdominal pain, constipation, diarrhea, nausea and vomiting.   Neurological: Positive for weakness.      Add: Psychiatric: Negative for SI/HI/AVH    Objective     Vital Signs    Temp:  [95.9 °F (35.5 °C)-98 °F (36.7 °C)] 97.3 °F (36.3 °C)  Heart Rate:  [64-74] 64  Resp:  [18-20] 18  BP: (110-144)/(55-64) 142/61    Physical Exam:   General Appearance: alert, appears stated age and cooperative,  Hygiene:   fair  Gait & Station: in chair at this time  Musculoskeletal: No tremors or abnormal involuntary movements    Mental Status Exam:   Cooperation:  Cooperative  Eye Contact:  Good  Psychomotor Behavior:  Appropriate  Mood: \"Fine\"  Affect:  mood-congruent  Speech:  Normal  Thought Process:  Coherent  Associations: Circumstantial  Thought Content:     Mood congruent   Suicidal:  None   Homicidal:  None   Hallucinations:  None   Delusion:  None  Cognitive Functioning:   Consciousness: awake, alert and oriented   Orientation:  Person, Place, Time and Situation   Attention: " normal Concentration: Normal   Language:  Intact Vocabulary: Average   Short Term Memory: Intact   Long Term Memory: Intact   Fund of Knowledge: Average  Reliability:  fair  Insight:  Fair  Judgement:  Fair  Impulse Control:  Fair    Diagnostic Data:    Lab Results (last 72 hours)     Procedure Component Value Units Date/Time    POC Glucose Once [[id number]]  (Abnormal) Collected: 07/05/22 1031    Specimen: Blood Updated: 07/05/22 1045     Glucose 262 mg/dL      Comment: RN NotifiedOperator: [id number] SELINA FAYEISMeter ID: [id number]       Blood Culture - Blood, Blood, Central Line [[id number]]  (Normal) Collected: 06/30/22 0746    Specimen: Blood, Central Line Updated: 07/05/22 0803     Blood Culture No growth at 5 days    Blood Culture - Blood, Arm, Right [[id number]]  (Normal) Collected: 06/30/22 0746    Specimen: Blood from Arm, Right Updated: 07/05/22 0803     Blood Culture No growth at 5 days    POC Glucose Once [[id number]]  (Abnormal) Collected: 07/05/22 0717    Specimen: Blood Updated: 07/05/22 0738     Glucose 154 mg/dL      Comment: RN NotifiedOperator: [id number] ZEV FAITHMeter ID: [id number]       Comprehensive Metabolic Panel [[id number]]  (Abnormal) Collected: 07/05/22 0601    Specimen: Blood Updated: 07/05/22 0643     Glucose 161 mg/dL      BUN 30 mg/dL      Creatinine 3.69 mg/dL      Sodium 132 mmol/L      Potassium 3.5 mmol/L      Chloride 92 mmol/L      CO2 27.0 mmol/L      Calcium 9.7 mg/dL      Total Protein 7.8 g/dL      Albumin 3.30 g/dL      ALT (SGPT) <5 U/L      AST (SGOT) 10 U/L      Alkaline Phosphatase 155 U/L      Total Bilirubin 0.2 mg/dL      Globulin 4.5 gm/dL      A/G Ratio 0.7 g/dL      BUN/Creatinine Ratio 8.1     Anion Gap 13.0 mmol/L      eGFR 13.2 mL/min/1.73      Comment: <15 Indicative of kidney failure       Narrative:      GFR Normal >60  Chronic Kidney Disease <60  Kidney Failure <15      CBC Auto Differential [[id number]]  (Abnormal) Collected: 07/05/22 0601     Specimen: Blood Updated: 07/05/22 0624     WBC 8.98 10*3/mm3      RBC 3.69 10*6/mm3      Hemoglobin 9.8 g/dL      Hematocrit 31.3 %      MCV 84.8 fL      MCH 26.6 pg      MCHC 31.3 g/dL      RDW 16.6 %      RDW-SD 50.4 fl      MPV 8.5 fL      Platelets 276 10*3/mm3      Neutrophil % 62.2 %      Lymphocyte % 22.3 %      Monocyte % 8.8 %      Eosinophil % 4.0 %      Basophil % 1.0 %      Immature Grans % 1.7 %      Neutrophils, Absolute 5.59 10*3/mm3      Lymphocytes, Absolute 2.00 10*3/mm3      Monocytes, Absolute 0.79 10*3/mm3      Eosinophils, Absolute 0.36 10*3/mm3      Basophils, Absolute 0.09 10*3/mm3      Immature Grans, Absolute 0.15 10*3/mm3      nRBC 0.0 /100 WBC     POC Glucose Once [[id number]]  (Abnormal) Collected: 07/04/22 2018    Specimen: Blood Updated: 07/04/22 2105     Glucose 295 mg/dL      Comment: RN NotifiedOperator: [id number] [facility] SYBILMeter ID: [id number]       POC Glucose Once [[id number]]  (Abnormal) Collected: 07/04/22 1616    Specimen: Blood Updated: 07/04/22 1707     Glucose 320 mg/dL      Comment: RN NotifiedOperator: [id number] MOSQUERA MELISSAMeter ID: [id number]       POC Glucose Once [[id number]]  (Abnormal) Collected: 07/04/22 1033    Specimen: Blood Updated: 07/04/22 1103     Glucose 332 mg/dL      Comment: RN NotifiedOperator: [id number] MOSQUERA MELISSAMeter ID: [id number]       Urine Culture - Urine, Urine, Clean Catch [[id number]]  (Abnormal) Collected: 07/02/22 1418    Specimen: Urine, Clean Catch Updated: 07/04/22 0930     Urine Culture >100,000 CFU/mL Yeast isolated    Narrative:      Colonization of the urinary tract without infection is common. Treatment is discouraged unless the patient is symptomatic, pregnant, or undergoing an invasive urologic procedure.    Comprehensive Metabolic Panel [[id number]]  (Abnormal) Collected: 07/04/22 0646    Specimen: Blood Updated: 07/04/22 0741     Glucose 383 mg/dL      BUN 60 mg/dL      Creatinine 5.65 mg/dL      Sodium 128 mmol/L       Potassium 4.2 mmol/L      Chloride 89 mmol/L      CO2 23.0 mmol/L      Calcium 9.2 mg/dL      Total Protein 7.5 g/dL      Albumin 3.20 g/dL      ALT (SGPT) <5 U/L      AST (SGOT) 10 U/L      Alkaline Phosphatase 198 U/L      Total Bilirubin 0.2 mg/dL      Globulin 4.3 gm/dL      A/G Ratio 0.7 g/dL      BUN/Creatinine Ratio 10.6     Anion Gap 16.0 mmol/L      eGFR 7.9 mL/min/1.73      Comment: <15 Indicative of kidney failure       Narrative:      GFR Normal >60  Chronic Kidney Disease <60  Kidney Failure <15      C-reactive Protein [[id number]]  (Abnormal) Collected: 07/04/22 0646    Specimen: Blood Updated: 07/04/22 0741     C-Reactive Protein 8.25 mg/dL     CBC Auto Differential [[id number]]  (Abnormal) Collected: 07/04/22 0646    Specimen: Blood Updated: 07/04/22 0718     WBC 8.81 10*3/mm3      RBC 3.61 10*6/mm3      Hemoglobin 9.7 g/dL      Hematocrit 30.6 %      MCV 84.8 fL      MCH 26.9 pg      MCHC 31.7 g/dL      RDW 16.2 %      RDW-SD 49.9 fl      MPV 8.7 fL      Platelets 302 10*3/mm3      Neutrophil % 63.8 %      Lymphocyte % 20.5 %      Monocyte % 7.3 %      Eosinophil % 5.8 %      Basophil % 1.1 %      Immature Grans % 1.5 %      Neutrophils, Absolute 5.62 10*3/mm3      Lymphocytes, Absolute 1.81 10*3/mm3      Monocytes, Absolute 0.64 10*3/mm3      Eosinophils, Absolute 0.51 10*3/mm3      Basophils, Absolute 0.10 10*3/mm3      Immature Grans, Absolute 0.13 10*3/mm3      nRBC 0.0 /100 WBC     POC Glucose Once [[id number]]  (Abnormal) Collected: 07/04/22 0605    Specimen: Blood Updated: 07/04/22 0617     Glucose 350 mg/dL      Comment: RN NotifiedOperator: [id number] ADAMA MCKEONMeter ID: [id number]       POC Glucose Once [[id number]]  (Abnormal) Collected: 07/03/22 1904    Specimen: Blood Updated: 07/03/22 2000     Glucose 259 mg/dL      Comment: RN NotifiedOperator: [id number] ADAMA JENNIFERMeter ID: [id number]       POC Glucose Once [[id number]]  (Abnormal) Collected: 07/03/22 1626    Specimen: Blood  Updated: 07/03/22 1643     Glucose 244 mg/dL      Comment: RN NotifiedOperator: [id number] MOSQUERA MELISSAMeter ID: [id number]       POC Glucose Once [[id number]]  (Abnormal) Collected: 07/03/22 1057    Specimen: Blood Updated: 07/03/22 1114     Glucose 371 mg/dL      Comment: Sliding Scale AdminOperator: [id number] PAT BURLESONLEYMeter ID: [id number]       Urine Culture - Urine, Urine, Clean Catch [[id number]]  (Abnormal) Collected: 06/30/22 1607    Specimen: Urine, Clean Catch Updated: 07/03/22 0745     Urine Culture Yeast isolated     Comment: No further workup.       Narrative:      Colonization of the urinary tract without infection is common. Treatment is discouraged unless the patient is symptomatic, pregnant, or undergoing an invasive urologic procedure.    Comprehensive Metabolic Panel [[id number]]  (Abnormal) Collected: 07/03/22 0445    Specimen: Blood Updated: 07/03/22 0520     Glucose 328 mg/dL      BUN 42 mg/dL      Creatinine 4.35 mg/dL      Sodium 128 mmol/L      Potassium 4.1 mmol/L      Chloride 90 mmol/L      CO2 25.0 mmol/L      Calcium 9.8 mg/dL      Total Protein 7.6 g/dL      Albumin 3.40 g/dL      ALT (SGPT) <5 U/L      AST (SGOT) 10 U/L      Alkaline Phosphatase 181 U/L      Total Bilirubin 0.2 mg/dL      Globulin 4.2 gm/dL      A/G Ratio 0.8 g/dL      BUN/Creatinine Ratio 9.7     Anion Gap 13.0 mmol/L      eGFR 10.9 mL/min/1.73      Comment: <15 Indicative of kidney failure       Narrative:      GFR Normal >60  Chronic Kidney Disease <60  Kidney Failure <15      Magnesium [[id number]]  (Abnormal) Collected: 07/03/22 0445    Specimen: Blood Updated: 07/03/22 0519     Magnesium 2.6 mg/dL     CBC Auto Differential [[id number]]  (Abnormal) Collected: 07/03/22 0445    Specimen: Blood Updated: 07/03/22 0501     WBC 10.15 10*3/mm3      RBC 3.63 10*6/mm3      Hemoglobin 9.6 g/dL      Hematocrit 31.4 %      MCV 86.5 fL      MCH 26.4 pg      MCHC 30.6 g/dL      RDW 16.7 %      RDW-SD 52.9 fl      MPV 8.8 fL       Platelets 337 10*3/mm3      Neutrophil % 60.5 %      Lymphocyte % 21.7 %      Monocyte % 9.1 %      Eosinophil % 4.8 %      Basophil % 1.1 %      Immature Grans % 2.8 %      Neutrophils, Absolute 6.15 10*3/mm3      Lymphocytes, Absolute 2.20 10*3/mm3      Monocytes, Absolute 0.92 10*3/mm3      Eosinophils, Absolute 0.49 10*3/mm3      Basophils, Absolute 0.11 10*3/mm3      Immature Grans, Absolute 0.28 10*3/mm3      nRBC 0.0 /100 WBC     POC Glucose Once [[id number]]  (Abnormal) Collected: 07/02/22 1936    Specimen: Blood Updated: 07/02/22 2006     Glucose 235 mg/dL      Comment: RN NotifiedOperator: [id number] LEONORA AMANDAMeter ID: [id number]       POC Glucose Once [[id number]]  (Abnormal) Collected: 07/02/22 1729    Specimen: Blood Updated: 07/02/22 1740     Glucose 221 mg/dL      Comment: Sliding Scale AdminOperator: [id number] PHILLIP MADISONMeter ID: [id number]       Potassium [[id number]]  (Normal) Collected: 07/02/22 1543    Specimen: Blood Updated: 07/02/22 1609     Potassium 4.4 mmol/L     Urinalysis, Microscopic Only - Urine, Clean Catch [[id number]]  (Abnormal) Collected: 07/02/22 1418    Specimen: Urine, Clean Catch Updated: 07/02/22 1514     RBC, UA None Seen /HPF      WBC, UA Too Numerous to Count /HPF      Bacteria, UA 3+ /HPF      Squamous Epithelial Cells, UA 0-2 /HPF      Hyaline Casts, UA 3-6 /LPF      Methodology Manual Light Microscopy    Urinalysis With Culture If Indicated - Urine, Clean Catch [[id number]]  (Abnormal) Collected: 07/02/22 1418    Specimen: Urine, Clean Catch Updated: 07/02/22 1436     Color, UA Dark Yellow     Appearance, UA Turbid     pH, UA 5.5     Specific Gravity, UA 1.029     Glucose,  mg/dL (1+)     Ketones, UA 15 mg/dL (1+)     Bilirubin, UA Small (1+)     Blood, UA Small (1+)     Protein, UA >=300 mg/dL (3+)     Leuk Esterase, UA Large (3+)     Nitrite, UA Negative     Urobilinogen, UA 1.0 E.U./dL    Narrative:      In absence of clinical symptoms, the presence  of pyuria, bacteria, and/or nitrites on the urinalysis result does not correlate with infection.        Imaging Results (Last 72 Hours)     ** No results found for the last 72 hours. **          Assessment & Plan       Ventilator associated pneumonia (HCC)    Hypertension    COPD (chronic obstructive pulmonary disease) (HCC)    Coronary artery disease involving native coronary artery of native heart without angina pectoris    Hypothyroidism    Acute cystitis with hematuria    Type 2 diabetes mellitus with autonomic neuropathy (HCC)    ESRD on hemodialysis (HCC)    Accidental drug overdose    Acute respiratory failure with hypoxia and hypercapnia (HCC)    Anemia    Positive fecal occult blood test    Yeast UTI      Recommendations:    Pt denies that overdose was intentional and that she does not want to die. She speaks of family and dog that she has reasons to live for.  Pt states that she doesn't need anything for depression at this time.   Thank you for consult, if you have further questions, please contact me.      Jessica Triplett, BRIDGETT  07/05/22  14:29 CDT

## 2022-07-05 NOTE — PLAN OF CARE
Goal Outcome Evaluation:  Plan of Care Reviewed With: patient        Progress: improving  Outcome Evaluation: Pt seen for skilled ST services this date to address oropharyngeal dysphagia.  Pt much improved this date and sitting at EOB and able to safely self feed.  SLP assessed pt's safety and swallow function w/regular and mech soft solids and thin liquids via straw.  Pt consumed regular (chicken strips) and mech soft (french fries) solids w/efficient mastication, good oral clearance, and timely AP transit.  Pt consumed thin liquids via straw w/no overt s/s of aspiration.  SLP recommends advancing pt's diet to regular solids/thin liquids at this time.  Recommend d/c on current diet at this time; pt in agreement w/recommendations.

## 2022-07-05 NOTE — DISCHARGE PLACEMENT REQUEST
"Yamileth Slater (62 y.o. Female)             Date of Birth   1960    Social Security Number       Address   [address] [place] [address]    Home Phone   [phone]    MRN   [id number]       Congregation   None    Marital Status                               Admission Date   6/25/22    Admission Type   Emergency    Admitting Provider   Charlene Castro MD    Attending Provider   Perez Hooker MD    Department, Room/Bed   [address], 304/1       Discharge Date       Discharge Disposition       Discharge Destination                               Attending Provider: Perez Hooker MD    Allergies: Adhesive Tape, Latex, Nsaids, Other    Isolation: Contact   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                 Active Insurance as of 6/25/2022     Primary Coverage     Payor Plan Insurance Group Employer/Plan Group    ANTHEM MEDICARE REPLACEMENT ANTHEM MEDICARE ADVANTAGE [id number]     Payor Plan Address Payor Plan Phone Number Payor Plan Fax Number Effective Dates    [address] [phone]  1/1/2022 - None Entered    [facility] [id number]       Subscriber Name Subscriber Birth Date Member ID       YAMILETH SLATER 1960 [id number]           Secondary Coverage     Payor Plan Insurance Group Employer/Plan Group    [place]      Payor Plan Address Payor Plan Phone Number Payor Plan Fax Number Effective Dates    [address] [phone]  6/28/2019 - None Entered    [facility] [id number]       Subscriber Name Subscriber Birth Date Member ID       YAMILETH SLATER 1960 [id number]                 Emergency Contacts      (Rel.) Home Phone Work Phone Mobile Phone    " Huy Slater (Spouse) [phone] -- [phone]    Yamileth Chavez (Daughter) [phone] -- [phone]    Suzanne Rivera (Daughter) [phone] -- [phone]            Emergency Contact Information     Name Relation Home Work Mobile    Huy Slater Spouse [phone]    Yamileth Chavez Daughter [phone]    Suzanne Rivera Daughter [phone]          Insurance Information                ANTH MEDICARE REPLACEMENT/[facility] MEDICARE ADVANTAGE Phone: [phone]    Subscriber: Bryon Yamileth Nga Subscriber#: [id number]    Group#: [id number] Precert#: --        [place] Phone: [phone]    Subscriber: Yamileth Slater Subscriber#: [id number]    Group#: -- Precert#: --             History & Physical      Charlene Castro MD at 06/25/22 1956     Attestation signed by Yadira Delarosa MD at 06/26/22 1652      I have performed a history and physical examination of the patient on 06/26/22 at 09:43AM. I have discussed the management of the patient with the resident.  I have reviewed the notes, assessment and plan, and/or procedures  performed by the resident. I concur with the resident’s documentation.    I have noted the following changes from initial resident’s H and P:  Pt is intubated/sedated at the time of my exam.    Appears comfortable, anion gap closed overnight; required pressors for a short period of time but is now maintaining MAP without pressor support.  Temp:  [97.4 °F (36.3 °C)-98.7 °F (37.1 °C)] 98.2 °F (36.8 °C)  Heart Rate:  [] 122  Resp:  [16-28] 20  BP: ()/(38-73) 114/59  FiO2 (%):  [30 %-100 %] 30 %  Intubated/sedated, central line in place left neck, RRR, diminished due to body habitus, Abd soft, NT/ND +BS, trace lower ext edema, trace nonpitting edema b/l upper ext, LLE wound covered by bandage    A/P: 62 y.o. female admitted with:  Active Hospital Problems    Diagnosis  POA   • **Severe sepsis  without septic shock (CODE) ([facility]) [R65.20]  Yes   • Accidental drug overdose [T50.901A]  Yes   • On mechanically assisted ventilation ([facility]) [Z99.11]  Not Applicable   • Pleural effusion on right [J90]  Yes   • Acute respiratory failure with hypoxia and hypercapnia ([facility]) [J96.01, J96.02]  Yes   • Anemia [D64.9]  Yes   • Altered mental status [R41.82]  Yes   • Hyperosmolar hyperglycemic state (HHS) ([facility]) [E11.00, E11.65]  Yes   • ESRD on hemodialysis ([facility]) [N18.6, Z99.2]  Not Applicable   • Type 2 diabetes mellitus with autonomic neuropathy ([facility]) [E11.43]  Yes   • Hypertension [I10]  Yes   • Coronary artery disease involving native coronary artery of native heart without angina pectoris [I25.10]  Yes   • COPD (chronic obstructive pulmonary disease) ([facility]) [J44.9]  Yes      Resolved Hospital Problems   No resolved problems to display.       - Hicuity managing vent  - Continue monitoring for potential flexeril overdose  - Nephrology following, will need HD tomorrow  - Anion gap closed, bridge with levemir & stop insulin drip  - follow up blood cultures, continue antibiotics  - receiving 2nd unit of blood this AM    Signature  [place] [address]  Office: [phone]    This document has been electronically signed by Yadira Delarosa MD on 2022 16:48 CDT                          HISTORY AND PHYSICAL  NAME: Yamileth Slater  : 1960  MRN: [id number]    DATE OF ADMISSION:  2022     DATE & TIME SEEN: 22 at 1956    PCP: Rianna Macias MD    CODE STATUS: Full code    CHIEF COMPLAINT:  AMS     HPI:  Yamileth Slater is a 62 y.o. female with a CMH of ESRD on dialysis MWF, diabetes, morbid obesity, CAD s/p stents, hyperlipidemia, COPD, hypothyroidism and chronic elevated troponin.  Patient presented to the ER via EMS due to altered mental status with concerns of drug overdose.  It was reported by EMS  that patient had ingested 10 tablets of Flexeril.     Patient was emergently intubated to protect the airway.  Patient received activated charcoal sorbitol x1.  Labs were significant with glucose of 605, anion gap 16, hemoglobin of 6.9 and elevated troponin.  ABG showed pH of 7.37, bicarb 27.9.     I spoke with patient's daughter Yamileth Chavez who reports that patient may have accidentally used multiple doses of Flexeril because she had not been feeling well and she did miss her dialysis appointment for today.    CONCURRENT MEDICAL HISTORY:  Past Medical History:   Diagnosis Date   • Acute blood loss anemia 4/16/2017    Likely due to gastric oozing at this time. - Dr. Duarte (GI) was consulted and has now signed off, will follow up outpatient - pill colonoscopy showed AVMs - continue to monitor   • Acute cystitis with hematuria 3/31/2021    1/13: IV Rocephin 1 gm q 24 1/14 : transitioned  to omnicef 300mg. Urine cultures resulted and did not show growth. Omnicef discontinued as patient is asymptomatic   • Altered mental status 1/9/2022    - AMS on presentation - initial ABG pH 7.3, CO2 34 - Procal 0.29 - UA negative for acute cysitits -CTA head wnl  - Empiric Zosyn and Vancomycin -Lactate 2.5 on admission  - blood cultures no growth at 24 hours     • Anxiety    • CAD (coronary artery disease) 4/24/2021    S/P 3 stents 5/1/2021 for [facility] Continue ASA 81mg & Clopidogrel 75mg Continue Atorvastatin 40mg   • Carotid artery stenosis    • Chronic obstructive lung disease (HCC)    • CKD (chronic kidney disease) stage 4, GFR 15-29 ml/min ([facility])    • CKD (chronic kidney disease), symptom management only, stage 5 ([facility]) 10/5/2020    Results from last 7 days Lab Units 12/15/21 0548 12/14/21 1323 12/14/21 0916 CREATININE mg/dL 3.92* 3.21* 3.32*  Baseline creatinine 2-3 GFR 13-25 GFR 15 Dialysis MWF, sees Dr. Lauren Nephrology consult,, appreciate recommendations Continue Bumex 1mg bid daily Holding Bumex 2mg 4 times a week   • Colonic  polyp    • Coronary arteriosclerosis    • Diabetes mellitus (HCC)    • Diabetic neuropathy ([facility])    • Ear pain, right 10/18/2021    - canal trauma due to patient scratching and DMT2 - added cortisporin ear drops   • Elevated troponin 10/12/2021    -most likely from CKD -Trending down -Neg chest pain   • GERD (gastroesophageal reflux disease)    • GI bleed 5/13/2021    - GI will follow up outpatient - Protonix 40mg daily - Avoid medical DVT prophy and use mechanical at this time instead. - Continue to monitor - pill colonoscopy results showed AVMs   • History of transfusion    • Hypercholesterolemia    • Hypertension    • Hypomagnesemia 6/27/2021    Monitor and replace   • Morbid obesity ([facility])    • Nephrolithiasis    • Peripheral vascular disease ([facility])    • SIRS (systemic inflammatory response syndrome) ([facility]) 1/9/2022    Admission  - WBC 17.78   -   - RR 16 - 1/10: VSS/wnl - CXR - Mild pulm edema - Blood cultures no growth at 24 hours  - Procalcitonin 0.29 - UA : glucose 1000, negative Leucocytes/nitrate - Empiric Zosyn and Vancomcyin    • Sleep apnea    • Substance abuse ([facility])    • Vitamin D deficiency        PAST SURGICAL HISTORY:  Past Surgical History:   Procedure Laterality Date   • CARDIAC CATHETERIZATION N/A 7/14/2020   • CARDIAC CATHETERIZATION N/A 4/23/2021    Procedure: Left Heart Cath;  Surgeon: Melba Romo MD;  Location: [facility] CATH INVASIVE LOCATION;  Service: Cardiology;  Laterality: N/A;   • CARDIAC CATHETERIZATION N/A 4/30/2021    Procedure: Percutaneous Coronary Intervention;  Surgeon: Russell Voss MD;  Location: [facility] CATH INVASIVE LOCATION;  Service: Cardiovascular;  Laterality: N/A;   • CARDIAC CATHETERIZATION N/A 4/30/2021    Procedure: Stent NIKKI coronary;  Surgeon: Russell Voss MD;  Location: [facility] CATH INVASIVE LOCATION;  Service: Cardiovascular;  Laterality: N/A;   • CARDIAC CATHETERIZATION Left 11/13/2021    Procedure: Left Heart Cath;  Surgeon: Gabriel  MD Niall;  Location: [facility] CATH INVASIVE LOCATION;  Service: Cardiology;  Laterality: Left;   • CAROTID STENT Left    • COLONOSCOPY     • COLONOSCOPY N/A 5/14/2021    Procedure: COLONOSCOPY;  Surgeon: Mingo Duarte MD;  Location: [facility] ENDOSCOPY;  Service: Gastroenterology;  Laterality: N/A;   • CORONARY ARTERY BYPASS GRAFT N/A 2013    CABG X 3   • CYSTOSCOPY BLADDER STONE LITHOTRIPSY Bilateral    • ENDOSCOPY N/A 4/12/2021    Procedure: ESOPHAGOGASTRODUODENOSCOPY;  Surgeon: Mingo Duarte MD;  Location: [facility] ENDOSCOPY;  Service: Gastroenterology;  Laterality: N/A;   • ENDOSCOPY N/A 5/14/2021    Procedure: ESOPHAGOGASTRODUODENOSCOPY;  Surgeon: Mingo Duarte MD;  Location: [facility] ENDOSCOPY;  Service: Gastroenterology;  Laterality: N/A;   • ENDOSCOPY N/A 1/28/2022    Procedure: ESOPHAGOGASTRODUODENOSCOPY;  Surgeon: Mingo Duarte MD;  Location: [facility] ENDOSCOPY;  Service: Gastroenterology;  Laterality: N/A;   • INTERVENTIONAL RADIOLOGY PROCEDURE N/A 10/21/2021    Procedure: tunneled central venous catheter placement;  Surgeon: Donnie Robles MD;  Location: [facility] ANGIO INVASIVE LOCATION;  Service: Interventional Radiology;  Laterality: N/A;   • INTERVENTIONAL RADIOLOGY PROCEDURE N/A 1/27/2022    Procedure: tunneled central venous catheter placement;  Surgeon: Donnie Robles MD;  Location: [facility] ANGIO INVASIVE LOCATION;  Service: Interventional Radiology;  Laterality: N/A;       FAMILY HISTORY:  Family History   Problem Relation Age of Onset   • Heart disease Mother    • Lung cancer Mother    • Heart disease Father    • Heart attack Father    • Diabetes Father    • Heart disease Half-Sister         Dad's side   • Heart disease Brother    • No Known Problems Sister    • No Known Problems Sister    • No Known Problems Sister    • No Known Problems Sister    • No Known Problems Sister    • Pancreatic cancer Half-Sister         Dad's side   • No Known Problems Brother    • No Known  Problems Brother    • Heart attack Half-Brother    • Heart attack Half-Brother    • No Known Problems Maternal Grandmother    • No Known Problems Maternal Grandfather    • No Known Problems Paternal Grandmother    • No Known Problems Paternal Grandfather         SOCIAL HISTORY:  Social History     Socioeconomic History   • Marital status:      Spouse name: wesley dumont   Tobacco Use   • Smoking status: Former Smoker     Packs/day: 0.25     Years: 46.00     Pack years: 11.50     Types: Cigarettes     Start date: 1999     Quit date: 2/15/2022     Years since quittin.3   • Smokeless tobacco: Never Used   • Tobacco comment: only smoking 5 a day - quit 2021   Substance and Sexual Activity   • Alcohol use: No   • Drug use: Not Currently     Types: LSD, Marijuana, Methamphetamines   • Sexual activity: Not Currently       HOME MEDICATIONS:  Prior to Admission medications    Medication Sig Start Date End Date Taking? Authorizing Provider   acetaminophen (Tylenol 8 Hour) 650 MG 8 hr tablet Take 1 tablet by mouth Every 8 (Eight) Hours As Needed for Mild Pain . 22   Blake Burris MD   albuterol (PROVENTIL) (2.5 MG/3ML) 0.083% nebulizer solution Inhale the contents of 1 vial by nebulization Every 4 (Four) Hours As Needed for Wheezing. 10/28/21   Haily Mcneil MD   albuterol sulfate  (90 Base) MCG/ACT inhaler Inhale 2 puffs Every 4 (Four) Hours As Needed for Wheezing. 3/26/21   Nirmal Calvillo MD   aspirin (aspirin) 81 MG EC tablet Take 1 tablet by mouth Daily. 21   Jr Jaime Estrada MD   atorvastatin (LIPITOR) 20 MG tablet Take 1 tablet by mouth every night at bedtime. 22   iKt Brar MD   Blood Glucose Monitoring Suppl ([employer] Blood Glucose Meter) w/Device kit 1 each 3 (Three) Times a Day. 10/9/20   Nirmal Calvillo MD   bumetanide (BUMEX) 2 MG tablet Take 1 tablet by mouth 4 (Four) Times a Week. Take on non-hemodialysis days. 3/17/22   Adarsh  "MD Alejandra   Capsaicin 0.035 % cream Apply 3 g topically 3 (Three) Times a Day As Needed (ankle pain). 4/26/22   Kit Brar MD   Cetirizine HCl 10 MG capsule Take 1 capsule by mouth Daily. 11/4/21   Caio Thompson MD   clopidogrel (PLAVIX) 75 MG tablet Take 1 tablet by mouth Daily. 3/26/21   Nirmal Calvillo MD   Continuous Blood Gluc  (FreeStyle Jade 2 [place]) device 1 each Continuous. 3/31/21   Nirmal Calvillo MD   Continuous Blood Gluc Sensor (FreeStyle Jade 2 Sensor) misc 1 each Every 14 (Fourteen) Days. 2/1/22   Blake Burris MD   cyclobenzaprine (FLEXERIL) 5 MG tablet Take 2 tablets by mouth At Night As Needed for Muscle Spasms. 4/26/22   Kit Brar MD   Diclofenac Sodium (VOLTAREN) 1 % gel gel Apply 4 g topically to the appropriate area as directed 4 (Four) Times a Day As Needed (Ankle pain). 4/26/22   Kit Brar MD   DULoxetine (CYMBALTA) 20 MG capsule Take 2 capsules by mouth Daily for 90 days. 4/26/22 7/25/22  Kit Brar MD   Easy Touch Insulin Syringe 30G X 5/16\" 0.5 ML misc USE AS DIRECTED WITH LEVEMIR 12/1/21   Caio Thompson MD   EASY TOUCH PEN NEEDLES 31G X 8 MM misc  8/7/20   Caio Thompson MD   gabapentin (NEURONTIN) 300 MG capsule Take 1 capsule by mouth Daily. And an extra pill M/W/F - dialysis days 4/26/22   Kit Brar MD   glucose blood test strip Use as instructed 10/9/20   Nirmal Calvillo MD   insulin detemir (LEVEMIR) 100 UNIT/ML injection Inject 25 Units under the skin into the appropriate area as directed Every Night. 4/19/22   Eduin Griffin MD   Insulin Lispro, 1 Unit Dial, (HUMALOG) 100 UNIT/ML solution pen-injector Inject 12 Units under the skin into the appropriate area as directed 3 (Three) Times a Day With Meals. 5/24/22   Blake Burris MD   Insulin Pen Needle (NovoFine) 30G X 8 MM misc As directed 4 times daily 3/26/21   Nirmal Calvillo MD   Insulin Pen Needle " 31G X 8 MM misc Use to inject insulin 4 (Four) Times a Day as directed. 10/28/21   Haily Mcneil MD   ipratropium-albuterol (DUO-NEB) 0.5-2.5 mg/3 ml nebulizer Take 3 mL by nebulization 4 (Four) Times a Day. 3/22/17   Caio Thompson MD   isosorbide mononitrate (IMDUR) 30 MG 24 hr tablet Take 1 tablet by mouth Every Morning. 3/3/22   Umesh Liz MD   levothyroxine (SYNTHROID, LEVOTHROID) 25 MCG tablet Take 25 mcg by mouth Daily. 9/1/21   Yadira Delarosa MD   lisinopril (PRINIVIL,ZESTRIL) 20 MG tablet Take 1 tablet by mouth Daily. 4/26/22   Kit Brar MD   Methoxy PEG-Epoetin Beta (MIRCERA IJ) 150 mcg Every 14 (Fourteen) Days. 3/2/22 3/1/23  Caio Thompson MD   Methoxy PEG-Epoetin Beta (MIRCERA IJ) 225 mcg Every 14 (Fourteen) Days. 3/14/22 3/13/23  Caio Thompson MD   metoprolol tartrate (LOPRESSOR) 25 MG tablet Take 1 tablet by mouth Every 12 (Twelve) Hours. 4/26/22   Kit Brar MD   montelukast (SINGULAIR) 10 MG tablet Take 1 tablet by mouth Every Night. 4/26/22   Kit Brar MD   muscle rub (BenGay) 10-15 % cream cream Apply 1 application topically to the appropriate area as directed 4 (Four) Times a Day As Needed for buttock pain. 1/19/22   Paulina Solano MD   nitroglycerin (NITROSTAT) 0.4 MG SL tablet Place 0.4 mg under the tongue Every 5 (Five) Minutes As Needed for Chest Pain (x 3 doses). 1/1/15   Caio Thompson MD   O2 (OXYGEN) Inhale 3 L/min Continuous. 1/1/21   Caio Thompson MD   ondansetron ODT (Zofran ODT) 4 MG disintegrating tablet Place 1 tablet on the tongue Every 8 (Eight) Hours As Needed for Nausea or Vomiting. 3/26/21   Nirmal Calvillo MD   oxybutynin XL (DITROPAN-XL) 5 MG 24 hr tablet Take 1 tablet by mouth Daily. 4/26/22   Kit Brar MD   pantoprazole (PROTONIX) 40 MG EC tablet Take 1 tablet by mouth Every Night. 4/26/22   Kit Brar MD   promethazine (PHENERGAN) 25 MG suppository  Insert  into the rectum Every 6 (Six) Hours. 11/4/21   Caio Thompson MD   ranolazine (RANEXA) 500 MG 12 hr tablet Take 1 tablet by mouth Every 12 (Twelve) Hours. 4/26/22   Kit Brar MD   Semaglutide (Rybelsus) 7 MG tablet Take 7 mg by mouth Daily. 5/19/22   Kit Brar MD   sevelamer (RENVELA) 800 MG tablet Take 800 mg by mouth 3 (Three) Times a Day With Meals. 1/26/22   Caio Thompson MD   sodium bicarbonate 650 MG tablet Take 1 tablet by mouth. 11/4/21   Caio Thompson MD   insulin aspart (novoLOG FLEXPEN) 100 UNIT/ML solution pen-injector sc pen Inject 30 Units under the skin into the appropriate area as directed 3 (Three) Times a Day With Meals. 2/1/22 3/17/22  Blake Burris MD       ALLERGIES:  Adhesive tape, Latex, Nsaids, and Other    REVIEW OF SYSTEMS  Review of Systems   Unable to perform ROS: Intubated       PHYSICAL EXAM:  Temp:  [97.4 °F (36.3 °C)-98.7 °F (37.1 °C)] 97.9 °F (36.6 °C)  Heart Rate:  [] 62  Resp:  [16-26] 26  BP: ()/(38-73) 95/50  FiO2 (%):  [35 %-100 %] 35 %  Body mass index is 53.57 kg/m².  Physical Exam  Constitutional:       Appearance: She is obese. She is ill-appearing.      Comments: Sedated    HENT:      Head: Normocephalic and atraumatic.      Right Ear: External ear normal.      Left Ear: External ear normal.      Nose: Nose normal.   Eyes:      General: No scleral icterus.        Right eye: No discharge.         Left eye: No discharge.   Cardiovascular:      Rate and Rhythm: Normal rate and regular rhythm.   Pulmonary:      Comments: Intubated   Abdominal:      General: Bowel sounds are normal.      Palpations: Abdomen is soft.   Musculoskeletal:      Right lower leg: Edema (+1) present.      Left lower leg: Edema (+1) present.   Skin:     General: Skin is warm and dry.      Capillary Refill: Capillary refill takes less than 2 seconds.      Findings: Lesion (open wound ) present.   Neurological:      Comments: Unable to  access   Psychiatric:      Comments: Unable to access          DIAGNOSTIC DATA:   Lab Results (last 24 hours)     Procedure Component Value Units Date/Time    STAT Lactic Acid, Reflex [[id number]]  (Normal) Collected: 06/26/22 0210    Specimen: Blood Updated: 06/26/22 0248     Lactate 1.7 mmol/L     Blood Culture - Blood, Hand, Right [[id number]] Collected: 06/26/22 0210    Specimen: Blood from Hand, Right Updated: 06/26/22 0217    Basic Metabolic Panel [[id number]]  (Abnormal) Collected: 06/26/22 0013    Specimen: Blood Updated: 06/26/22 0103     Glucose 329 mg/dL      BUN 54 mg/dL      Creatinine 3.87 mg/dL      Sodium 132 mmol/L      Potassium 3.1 mmol/L      Chloride 92 mmol/L      CO2 24.0 mmol/L      Calcium 7.8 mg/dL      BUN/Creatinine Ratio 14.0     Anion Gap 16.0 mmol/L      eGFR 12.5 mL/min/1.73      Comment: <15 Indicative of kidney failure       Narrative:      GFR Normal >60  Chronic Kidney Disease <60  Kidney Failure <15      Magnesium [[id number]]  (Normal) Collected: 06/26/22 0013    Specimen: Blood Updated: 06/26/22 0103     Magnesium 1.6 mg/dL     Phosphorus [[id number]]  (Normal) Collected: 06/26/22 0013    Specimen: Blood Updated: 06/26/22 0103     Phosphorus 3.9 mg/dL     Troponin [[id number]]  (Abnormal) Collected: 06/26/22 0013    Specimen: Blood Updated: 06/26/22 0053     Troponin T 0.058 ng/mL     Narrative:      Troponin T Reference Range:  <= 0.03 ng/mL-   Negative for AMI  >0.03 ng/mL-     Abnormal for myocardial necrosis.  Clinicians would have to utilize clinical acumen, EKG, Troponin and serial changes to determine if it is an Acute Myocardial Infarction or myocardial injury due to an underlying chronic condition.       Results may be falsely decreased if patient taking Biotin.      POC Glucose Once [[id number]]  (Abnormal) Collected: 06/26/22 0000    Specimen: Blood Updated: 06/26/22 0030     Glucose 367 mg/dL      Comment: : [id number] NINO KATIEMeter ID: [id number]        Procalcitonin [[id number]] Collected: 06/26/22 0013    Specimen: Blood Updated: 06/26/22 0029    Lactic Acid, Plasma [[id number]]  (Abnormal) Collected: 06/25/22 2254    Specimen: Blood Updated: 06/25/22 2337     Lactate 2.9 mmol/L     Blood Gas, Arterial - [[id number]]  (Abnormal) Collected: 06/25/22 2154    Specimen: Arterial Blood Updated: 06/25/22 2200     Site Left Radial     Shadi's Test N/A     pH, Arterial 7.314 pH units      Comment: 84 Value below reference range        pCO2, Arterial 53.4 mm Hg      Comment: 83 Value above reference range        pO2, Arterial 110.0 mm Hg      Comment: 83 Value above reference range        HCO3, Arterial 27.1 mmol/L      Comment: 83 Value above reference range        Base Excess, Arterial 0.8 mmol/L      O2 Saturation, Arterial 98.9 %      Barometric Pressure for Blood Gas 747 mmHg      Modality Ventilator     FIO2 35 %      Ventilator Mode NA     Set Tidal Volume 500     Set Mech Resp Rate 20.0     PEEP 5.0     Collected by Carl     Comment: Meter: [id number]     :  [id number]       Osmolality, Serum [[id number]]  (Abnormal) Collected: 06/25/22 2003    Specimen: Blood Updated: 06/25/22 2052     Osmolality 308 mOsm/kg     Manual Differential [[id number]]  (Abnormal) Collected: 06/25/22 1832    Specimen: Blood Updated: 06/25/22 2041     Neutrophil % 78.0 %      Lymphocyte % 17.0 %      Monocyte % 3.0 %      Eosinophil % 1.0 %      Bands %  1.0 %      Neutrophils Absolute 7.17 10*3/mm3      Lymphocytes Absolute 1.54 10*3/mm3      Monocytes Absolute 0.27 10*3/mm3      Eosinophils Absolute 0.09 10*3/mm3      Anisocytosis Mod/2+     Hypochromia Slight/1+     Polychromasia Slight/1+     WBC Morphology Normal     Platelet Estimate Adequate    Troponin [[id number]]  (Abnormal) Collected: 06/25/22 2003    Specimen: Blood Updated: 06/25/22 2037     Troponin T 0.055 ng/mL     Narrative:      Troponin T Reference Range:  <= 0.03 ng/mL-   Negative for AMI  >0.03 ng/mL-      Abnormal for myocardial necrosis.  Clinicians would have to utilize clinical acumen, EKG, Troponin and serial changes to determine if it is an Acute Myocardial Infarction or myocardial injury due to an underlying chronic condition.       Results may be falsely decreased if patient taking Biotin.      Comprehensive Metabolic Panel [[id number]]  (Abnormal) Collected: 06/25/22 2003    Specimen: Blood Updated: 06/25/22 2036     Glucose 576 mg/dL      BUN 55 mg/dL      Creatinine 3.87 mg/dL      Sodium 127 mmol/L      Potassium 3.5 mmol/L      Chloride 85 mmol/L      CO2 26.0 mmol/L      Calcium 8.1 mg/dL      Total Protein 6.9 g/dL      Albumin 3.20 g/dL      ALT (SGPT) <5 U/L      AST (SGOT) 8 U/L      Alkaline Phosphatase 230 U/L      Total Bilirubin 0.2 mg/dL      Globulin 3.7 gm/dL      A/G Ratio 0.9 g/dL      BUN/Creatinine Ratio 14.2     Anion Gap 16.0 mmol/L      eGFR 12.5 mL/min/1.73      Comment: <15 Indicative of kidney failure       Narrative:      GFR Normal >60  Chronic Kidney Disease <60  Kidney Failure <15      Phosphorus [[id number]]  (Abnormal) Collected: 06/25/22 2003    Specimen: Blood Updated: 06/25/22 2032     Phosphorus 4.7 mg/dL     Magnesium [[id number]]  (Normal) Collected: 06/25/22 2003    Specimen: Blood Updated: 06/25/22 2032     Magnesium 1.6 mg/dL     CBC & Differential [[id number]]  (Abnormal) Collected: 06/25/22 2003    Specimen: Blood Updated: 06/25/22 2030    Narrative:      The following orders were created for panel order CBC & Differential.  Procedure                               Abnormality         Status                     ---------                               -----------         ------                     CBC Auto Differential[[id number]]        Abnormal            Final result                 Please view results for these tests on the individual orders.    CBC Auto Differential [[id number]]  (Abnormal) Collected: 06/25/22 2003    Specimen: Blood Updated: 06/25/22 2030     WBC 10.64  10*3/mm3      RBC 2.27 10*6/mm3      Hemoglobin 6.1 g/dL      Hematocrit 18.3 %      MCV 80.6 fL      MCH 26.9 pg      MCHC 33.3 g/dL      RDW 17.1 %      RDW-SD 47.7 fl      MPV 9.1 fL      Platelets 317 10*3/mm3      Neutrophil % 79.7 %      Lymphocyte % 12.3 %      Monocyte % 5.1 %      Eosinophil % 1.5 %      Basophil % 0.6 %      Immature Grans % 0.8 %      Neutrophils, Absolute 8.48 10*3/mm3      Lymphocytes, Absolute 1.31 10*3/mm3      Monocytes, Absolute 0.54 10*3/mm3      Eosinophils, Absolute 0.16 10*3/mm3      Basophils, Absolute 0.06 10*3/mm3      Immature Grans, Absolute 0.09 10*3/mm3      nRBC 0.0 /100 WBC     Hemoglobin A1c [[id number]]  (Abnormal) Collected: 06/25/22 1832    Specimen: Blood Updated: 06/25/22 1936     Hemoglobin A1C 9.60 %     Narrative:      Hemoglobin A1C Ranges:    Increased Risk for Diabetes  5.7% to 6.4%  Diabetes                     >= 6.5%  Diabetic Goal                < 7.0%    Phosphorus [[id number]]  (Abnormal) Collected: 06/25/22 1832    Specimen: Blood Updated: 06/25/22 1935     Phosphorus 5.0 mg/dL     Osmolality, Serum [[id number]]  (Abnormal) Collected: 06/25/22 1832    Specimen: Blood Updated: 06/25/22 1910     Osmolality 309 mOsm/kg     COVID-19 and FLU A/B PCR - Swab, Nasopharynx [[id number]]  (Normal) Collected: 06/25/22 1839    Specimen: Swab from Nasopharynx Updated: 06/25/22 1906     COVID19 Not Detected     Influenza A PCR Not Detected     Influenza B PCR Not Detected    Narrative:      Fact sheet for providers: https://www.fda.gov/media/800446/download    Fact sheet for patients: https://www.fda.gov/media/029819/download    Test performed by PCR.    Comprehensive Metabolic Panel [[id number]]  (Abnormal) Collected: 06/25/22 1832    Specimen: Blood Updated: 06/25/22 1903     Glucose 605 mg/dL      BUN 56 mg/dL      Creatinine 3.75 mg/dL      Sodium 125 mmol/L      Potassium 3.8 mmol/L      Comment: Slight hemolysis detected by analyzer. Results may be affected.         Chloride 85 mmol/L      CO2 24.0 mmol/L      Calcium 8.4 mg/dL      Total Protein 7.0 g/dL      Albumin 2.90 g/dL      ALT (SGPT) 5 U/L      AST (SGOT) 10 U/L      Alkaline Phosphatase 241 U/L      Total Bilirubin 0.2 mg/dL      Globulin 4.1 gm/dL      A/G Ratio 0.7 g/dL      BUN/Creatinine Ratio 14.9     Anion Gap 16.0 mmol/L      eGFR 13.0 mL/min/1.73      Comment: <15 Indicative of kidney failure       Narrative:      GFR Normal >60  Chronic Kidney Disease <60  Kidney Failure <15      Urine Drug Screen - Urine, Clean Catch [[id number]]  (Normal) Collected: 06/25/22 1840    Specimen: Urine, Clean Catch Updated: 06/25/22 1901     THC, Screen, Urine Negative     Phencyclidine (PCP), Urine Negative     Cocaine Screen, Urine Negative     Methamphetamine, Ur Negative     Opiate Screen Negative     Amphetamine Screen, Urine Negative     Benzodiazepine Screen, Urine Negative     Tricyclic Antidepressants Screen Negative     Methadone Screen, Urine Negative     Barbiturates Screen, Urine Negative     Oxycodone Screen, Urine Negative     Propoxyphene Screen Negative     Buprenorphine, Screen, Urine Negative    Narrative:      Cutoff For Drugs Screened:    Amphetamines               500 ng/ml  Barbiturates               200 ng/ml  Benzodiazepines            150 ng/ml  Cocaine                    150 ng/ml  Methadone                  200 ng/ml  Opiates                    100 ng/ml  Phencyclidine               25 ng/ml  THC                            50 ng/ml  Methamphetamine            500 ng/ml  Tricyclic Antidepressants  300 ng/ml  Oxycodone                  100 ng/ml  Propoxyphene               300 ng/ml  Buprenorphine               10 ng/ml    The normal value for all drugs tested is negative. This report includes unconfirmed screening results, with the cutoff values listed, to be used for medical treatment purposes only.  Unconfirmed results must not be used for non-medical purposes such as employment or legal testing.   Clinical consideration should be applied to any drug of abuse test, particularly when unconfirmed results are used.      Urinalysis, Microscopic Only - Urine, Catheter [[id number]]  (Abnormal) Collected: 06/25/22 1840    Specimen: Urine, Catheter Updated: 06/25/22 1852     RBC, UA 0-2 /HPF      WBC, UA 13-20 /HPF      Bacteria, UA None Seen /HPF      Squamous Epithelial Cells, UA 0-2 /HPF      Hyaline Casts, UA None Seen /LPF      Methodology Automated Microscopy    Lipase [[id number]]  (Abnormal) Collected: 06/25/22 1832    Specimen: Blood Updated: 06/25/22 1851     Lipase 69 U/L     Acetaminophen Level [[id number]]  (Normal) Collected: 06/25/22 1832    Specimen: Blood Updated: 06/25/22 1851     Acetaminophen <5.0 mcg/mL     Ethanol [[id number]] Collected: 06/25/22 1832    Specimen: Blood Updated: 06/25/22 1851     Ethanol <10 mg/dL      Ethanol % <0.010 %     Salicylate Level [[id number]]  (Normal) Collected: 06/25/22 1832    Specimen: Blood Updated: 06/25/22 1851     Salicylate <0.3 mg/dL     Magnesium [[id number]]  (Normal) Collected: 06/25/22 1832    Specimen: Blood Updated: 06/25/22 1851     Magnesium 1.6 mg/dL     Urinalysis With Microscopic If Indicated (No Culture) - Urine, Catheter [[id number]]  (Abnormal) Collected: 06/25/22 1840    Specimen: Urine, Catheter Updated: 06/25/22 1851     Color, UA Yellow     Appearance, UA Clear     pH, UA 6.0     Specific Gravity, UA 1.014     Glucose, UA >=1000 mg/dL (3+)     Ketones, UA Negative     Bilirubin, UA Negative     Blood, UA Negative     Protein,  mg/dL (2+)     Leuk Esterase, UA Negative     Nitrite, UA Negative     Urobilinogen, UA 0.2 E.U./dL    Protime-INR [[id number]]  (Normal) Collected: 06/25/22 1832    Specimen: Blood Updated: 06/25/22 1851     Protime 14.1 Seconds      INR 1.11    Narrative:      Therapeutic range for most indications is 2.0-3.0 INR,  or 2.5-3.5 for mechanical heart valves.    aPTT [[id number]]  (Normal) Collected: 06/25/22 6306     Specimen: Blood Updated: 06/25/22 1851     PTT 30.4 seconds     Narrative:      The recommended Heparin therapeutic range is 68-97 seconds.    Troponin [[id number]]  (Abnormal) Collected: 06/25/22 1832    Specimen: Blood Updated: 06/25/22 1850     Troponin T 0.055 ng/mL     Narrative:      Troponin T Reference Range:  <= 0.03 ng/mL-   Negative for AMI  >0.03 ng/mL-     Abnormal for myocardial necrosis.  Clinicians would have to utilize clinical acumen, EKG, Troponin and serial changes to determine if it is an Acute Myocardial Infarction or myocardial injury due to an underlying chronic condition.       Results may be falsely decreased if patient taking Biotin.      Blood Gas, Arterial - [[id number]]  (Abnormal) Collected: 06/25/22 1846    Specimen: Arterial Blood Updated: 06/25/22 1849     Site Left Radial     Shadi's Test N/A     pH, Arterial 7.375 pH units      pCO2, Arterial 47.7 mm Hg      Comment: 83 Value above reference range        pO2, Arterial 348.0 mm Hg      Comment: 83 Value above reference range        HCO3, Arterial 27.9 mmol/L      Comment: 83 Value above reference range        Base Excess, Arterial 2.4 mmol/L      Comment: 83 Value above reference range        O2 Saturation, Arterial >100.0 %      Comment: 93 Value above reportable range > 100.0        Barometric Pressure for Blood Gas 746 mmHg      Modality Ventilator     FIO2 100 %      Ventilator Mode AC     Set Tidal Volume 500     Set Mech Resp Rate 20.0     PEEP 5.0     Collected by RT     Comment: Meter: [id number]     :  [id number]       CBC & Differential [[id number]]  (Abnormal) Collected: 06/25/22 1832    Specimen: Blood Updated: 06/25/22 1841    Narrative:      The following orders were created for panel order CBC & Differential.  Procedure                               Abnormality         Status                     ---------                               -----------         ------                     CBC Auto  Differential[[id number]]        Abnormal            Final result                 Please view results for these tests on the individual orders.    CBC Auto Differential [[id number]]  (Abnormal) Collected: 06/25/22 1832    Specimen: Blood Updated: 06/25/22 1841     WBC 9.07 10*3/mm3      RBC 2.68 10*6/mm3      Hemoglobin 6.9 g/dL      Hematocrit 21.5 %      MCV 80.2 fL      MCH 25.7 pg      MCHC 32.1 g/dL      RDW 17.3 %      RDW-SD 48.3 fl      MPV 9.1 fL      Platelets 317 10*3/mm3            Imaging Results (Last 24 Hours)     Procedure Component Value Units Date/Time    XR Chest 1 View [[id number]] Collected: 06/25/22 2220     Updated: 06/25/22 2320    Narrative:      XR CHEST 1 VIEW    INDICATION: 62 years Female; line placement LEFT IJ, R41.82  Altered mental status, unspecified J96.00 Acute respiratory  failure, unspecified whether with hypoxia or hypercapnia J81.0  Acute pulmonary edema N18.6 End stage renal disease R73.9  Hyperglycemia, unspecified    TECHNIQUE: 1 view of the chest was performed.    Comparison: 6/25/2022, 9:41 PM.    FINDINGS:  Lungs/Pleura: Small bilateral pleural effusions are unchanged.  Patchy airspace and interstitial opacities in both lungs  predominantly in the bilateral lower lobes appear slightly  worsened from previous study. No pneumothorax.   Heart/Mediastinum: Cardiomegaly.   Bones: Median sternotomy wires are intact.  Soft tissues: Unremarkable.  Lines/Tubes: The tip of the right central venous catheter is at  the cavoatrial junction. The tip of the endotracheal tube is 3.7  cm above the valentina. The tip of the enteric tube is at the distal  esophagus; recommend advancement. The tip of the left IJ catheter  is in the upper SVC, approximately 4 cm above the cavoatrial  junction..  Incidental findings: None.      Impression:      1.  The tip of the left IJ catheter is in the upper SVC,  approximately 4 cm above the cavoatrial junction.  2.  The tip of the right central venous catheter  is at the  cavoatrial junction.   3.  The tip of the endotracheal tube is 3.7 cm above the valentina.   4.  The tip of the enteric tube is at the distal esophagus;  recommend advancement.  5.  Small bilateral pleural effusions are unchanged. Patchy  airspace and interstitial opacities in both lungs predominantly  in the bilateral lower lobes appear slightly worsened from  previous study.     Electronically signed by:  Tiff Ga  6/25/2022 11:18 PM CDT  Workstation: [id number]    XR Chest 1 View [[id number]] Collected: 06/25/22 2135     Updated: 06/25/22 2225    Narrative:      XR CHEST 1 VIEW    INDICATION: 62 years Female; Confirm ET Tube Placement, R41.82  Altered mental status, unspecified J96.00 Acute respiratory  failure, unspecified whether with hypoxia or hypercapnia J81.0  Acute pulmonary edema N18.6 End stage renal disease R73.9  Hyperglycemia, unspecified    TECHNIQUE: 1 view of the chest was performed.    Comparison: 6/25/2022, 6:24 PM.    FINDINGS:  Lungs/Pleura: Interval improvement of the bilateral pleural  effusions. Interval improvement of the pulmonary edema. No  pneumothorax.   Heart/Mediastinum: Cardiomegaly.   Bones: Unremarkable.  Soft tissues: Unremarkable.  Lines/Tubes: The tip of the right central venous catheter is at  the cavoatrial junction. The tip of the endotracheal tube is 3.4  cm is above the valentina. The tip of the enteric tube is at the  distal esophagus; recommend advancement.  Incidental findings: None.      Impression:        1.  The tip of the right central venous catheter is at the  cavoatrial junction.   2.  The tip of the endotracheal tube is 3.4 cm is above the  valentina.   3.  The tip of the enteric tube is at the distal esophagus;  recommend advancement.  4.  Cardiomegaly.   5.  Interval improvement of the bilateral pleural effusions and  pulmonary edema.     Electronically signed by:  Tiff Ga  6/25/2022 10:23 PM CDT  Workstation: [id number]    XR Chest 1 View [[id number]]  Collected: 06/25/22 1841     Updated: 06/25/22 1936    Narrative:      INDICATION: ams    EXAMINATION/TECHNIQUE:   X-RAY - XR CHEST 1 VIEW    COMPARISON: Chest x-ray 4/15/2022  ____________________________________________    FINDINGS:      LINES/DEVICES: Endotracheal tube tip is 3.2 cm from valentina.  Enteric tube is not visualized distally due to poor penetration.  Recommend follow-up abdominal film to localize the tip. The  dialysis catheter is stable in position    LUNGS: There is pulmonary edema. There is a small right effusion.  Left lung is clear. There is no pneumothorax    MEDIASTINUM AND CARDIOVASCULAR STRUCTURES: Cardiac silhouette is  prominent. There is vascular congestion.    BONES AND SOFT TISSUES: Unremarkable.        Impression:        Mild CHF with edema and small right effusion. Distal enteric tube  is not visualized.    Electronically signed by:  Pablo Rubio MD  6/25/2022 7:34 PM CDT  Workstation: [id number]    CT Head Without Contrast [[id number]] Collected: 06/25/22 1902     Updated: 06/25/22 1923    Narrative:      EXAM DESCRIPTION:     CT HEAD WO CONTRAST    CLINICAL HISTORY:   ams    TECHNIQUE:     5mm slice thickness axial images through the brain were performed  in bone and soft tissue windows in the absence of intravenous  contrast.  This exam was performed according to our departmental  dose-optimization program which includes use of Automated  Exposure Control, adjustment of the mA and/or kV according to  patient size and/or use of iterative reconstruction technique.     COMPARISON:     None.    FINDINGS:     There is diffuse age-appropriate atrophy seen throughout the  brain parenchyma. Mild periventricular white matter changes are  seen to be present and there is mild ex vacuo dilatation of the  ventricular system. There is no intra-axial or extra-axial bleed.  There is no mass or mass effect.    The mastoid air cells are patent. No fracture is present. Mucosal  thickening in the nostrils  ethmoid air cells.      Impression:      No acute intracranial pathology.    Electronically signed by:  nAny Morrison MD, FREDERICK  6/25/2022  7:20 PM CDT Workstation: [id number]            I reviewed the patient's new clinical results.    ASSESSMENT AND PLAN: This is a 62 y.o. female with:    Active Hospital Problems    Diagnosis  POA   • **Altered mental status [R41.82]  Yes     Priority: High     Likely due to drug overdose.  Labs obtained  Initial ABG showed hypercapnia  Patient intubated on mechanical ventilation.  Tidal volume 8 cc/kg,  Sedation orders placed for Versed.  Soft restraint order placed, needs to be reassessed.  UDS is negative  Obtain serial ABG  Monitor labs and vitals         • Accidental drug overdose [T50.901A]  Yes     Priority: High     Reports of 10 tablets of 10 mg of Flexeril ingested.  Overdose labs obtained including UDS, acetaminophen, salicylate, ethanol level  EKG showed right bundle branch block  Poison control notified they Recommend symptomatic care       • On mechanically assisted ventilation (HCC) [Z99.11]  Not Applicable     Priority: High     Vent management and sedation orders placed.   [facility] intensivist group consulted for vent management appreciate recommendations        • Severe sepsis with septic shock (HCC) [A41.9, R65.21]  Yes     Priority: High     SIR + hypotension.   Receive 1 dose of ceftriaxone in ER   Cefepime and vanc initiated. Deescalate antibiotics as indicated   Levophed started   Monitor labs and vitals      • Acute respiratory failure with hypoxia and hypercapnia (HCC) [J96.01, J96.02]  Yes     Priority: High   • Anemia [D64.9]  Yes     Priority: High     Hgb 6.9 on admission   Type and screen   1 unit blood ordered  Monitor H &H      • Hyperosmolar hyperglycemic state (HHS) (HCC) [E11.00, E11.65]  Yes     Priority: High     Serum glucose 605 on admission   Anion gap 16  PH 7.37  Bicarb 27.9  Continue fluids   Insulin drip with HHS protocol         • Pleural effusion on right [J90]  Yes     Priority: Medium     CXR showed a small right pleural effusion.   Last Echo 1/2022 EF 61-65 %  Continue to monitor        • ESRD on hemodialysis (HCC) [N18.6, Z99.2]  Not Applicable     Priority: Medium     -Receives hemodialysis MWF at [facility] in [place]  Missed dialysis on Friday per family  Nephrology consulted appreciated recs        • Type 2 diabetes mellitus with autonomic neuropathy (HCC) [E11.43]  Yes     On HHS protocol  Will resume home insulin and SSI once stable     • Hypertension [I10]  Yes     Hold medications due to hypotension  Resume home meds when Vitals are stable       • Coronary artery disease involving native coronary artery of native heart without angina pectoris [I25.10]  Yes     - S/P CABG, Bilateral carotid artery stenosis w/ 2 stents on left side,  PAD (peripheral artery disease) with stent in right upper leg  - Hold Home Plavix, Lipidtor, Lisinopril, Lopressor, Imdur, Ranexa, ASA       • COPD (chronic obstructive pulmonary disease) ([facility]) [J44.9]  Yes       DVT prophylaxis: SCDs/TEDs     Yamileth Slater and I have discussed pain goals for this hospitalization after reviewing her current clinical condition, medical history and prior pain experiences.  The goal is to keep the pain level controlled.  To help achieve this, I plan to give pain medicines as needed.    JULIAN per PDMP, reviewed and consistent with patient reported medications.    Expected Length of Stay: Where: current living arrangements and When:  2-4days    I discussed the patient's findings and my recommendations with family.     Yadira Delarosa MD is the attending on record at time of admission, She is aware of the patient's status and agrees with the above history and physical.      Charlene Castro M.D. PGY 2  [facility]  [address] [place] [address]  Office: [phone]  This document has been  electronically signed by Charlene Castro MD on 2022 02:48 CDT        Electronically signed by Yadira Delarosa MD at 22 1652          Physician Progress Notes (most recent note)      Perez Hooker MD at 22 0620     Attestation signed by Kit Brar MD at 22 1121    I have seen and evaluated the patient.  I have discussed the case with the resident. I have reviewed the notes, assessment and plan, and/or procedures performed by the resident. I concur with the resident’s documentation.       No overnight events. Resting comfortably. Blood sugars improving with insulin regimen.     Physical Exam:  General: NAD.  CV: S1 and S2 normal. RRR.  Pulmonary: Diminished breath sounds present bilaterally.   Abdomen: Bowel sounds present and normal.  Abdomen is soft, and nontender.  Extremities: No lower extremity edema.      Plan: Continue Zyvox, continue current insulin regimen. PT/OT evals. Patient agreeable for discussion on short term rehab to home.       This document has been electronically signed by Kit Barr MD on 2022 11:21 CDT                        F[facility]ILY [facility]ICINE RESIDENCY SERVICE  DAILY PROGRESS NOTE    NAME: Yamileth Slater  : 1960  MRN: [id number]      LOS: 10 days     PROVIDER OF SERVICE: Perez Hooker MD    Chief Complaint: Ventilator associated pneumonia (HCC)    Subjective:     Interval History:  History taken from: patient chart  Feeling better. No complaints this am. HD yesterday. Has home trilogy in room. On 2L NC.     Review of Systems:   Review of Systems   Constitutional: Negative for chills and fever.   Respiratory: Positive for cough. Negative for shortness of breath.    Cardiovascular: Negative for chest pain and leg swelling.   Gastrointestinal: Negative for abdominal pain, constipation, diarrhea, nausea and vomiting.   Neurological: Positive for weakness.       Objective:     Vital Signs  Temp:  [95.9 °F  (35.5 °C)-98 °F (36.7 °C)] 95.9 °F (35.5 °C)  Heart Rate:  [67-74] 68  Resp:  [18-20] 20  BP: (110-173)/(55-72) 138/60  Flow (L/min):  [2] 2   Body mass index is 49 kg/m².    Physical Exam  Physical Exam  Vitals reviewed.   Constitutional:       General: She is not in acute distress.     Appearance: She is morbidly obese.      Interventions: Nasal cannula in place.      Comments: 2L NC   HENT:      Head: Normocephalic and atraumatic.   Eyes:      Conjunctiva/sclera: Conjunctivae normal.   Cardiovascular:      Rate and Rhythm: Normal rate and regular rhythm.      Pulses: Normal pulses.      Heart sounds: Normal heart sounds.   Pulmonary:      Effort: Pulmonary effort is normal.      Breath sounds: No rhonchi or rales.      Comments: Quiet breath sounds in all lung fields  Abdominal:      Palpations: Abdomen is soft.      Tenderness: There is no abdominal tenderness.   Musculoskeletal:      Right lower leg: No edema.      Left lower leg: No edema.   Skin:     General: Skin is warm.   Neurological:      Mental Status: She is alert. Mental status is at baseline.   Psychiatric:         Mood and Affect: Mood normal.         Behavior: Behavior normal.         Scheduled Meds   bumetanide, 2 mg, Oral, Once per day on Sun Tue Thu Sat  epoetin hubert/hubert-epbx, 6,000 Units, Intravenous, Once per day on Mon Wed Fri  [START ON 7/6/2022] fluconazole, 200 mg, Oral, Once per day on Mon Wed Fri   And  fluconazole, 100 mg, Oral, Once per day on Sun Tue Thu Sat  gabapentin, 300 mg, Nasogastric, Daily  guaiFENesin, 1,200 mg, Oral, Q12H  Insulin Aspart, 0-9 Units, Subcutaneous, TID AC  Insulin Aspart, 8 Units, Subcutaneous, 4x Daily  insulin detemir, 25 Units, Subcutaneous, Q12H  isosorbide mononitrate, 30 mg, Oral, Q24H  levothyroxine, 25 mcg, Oral, Q AM  Linezolid, 600 mg, Intravenous, Q12H  lisinopril, 20 mg, Oral, Once per day on Sun Tue Thu Sat  metoprolol tartrate, 25 mg, Oral, Q12H  nystatin, , Topical, Q12H  pantoprazole, 40 mg,  Intravenous, Q24H  Scopolamine, 1 patch, Transdermal, Q72H  senna-docusate sodium, 2 tablet, Oral, BID  sodium chloride, 10 mL, Intravenous, Q12H  sodium chloride, 10 mL, Intravenous, Q12H  sodium chloride, 10 mL, Intravenous, Q12H       PRN Meds   •  acetaminophen  •  senna-docusate sodium **AND** polyethylene glycol **AND** bisacodyl **AND** bisacodyl  •  dextrose  •  dextrose  •  dextrose  •  glucagon (human recombinant)  •  guaiFENesin  •  heparin (porcine)  •  hydrALAZINE  •  magnesium sulfate **OR** magnesium sulfate in D5W 1g/100mL (PREMIX) **OR** magnesium sulfate  •  metoprolol tartrate  •  [] Morphine **AND** naloxone  •  potassium chloride **OR** potassium chloride **OR** potassium chloride  •  [DISCONTINUED] sodium chloride **AND** potassium chloride  •  [DISCONTINUED] sodium chloride **AND** potassium chloride  •  sodium chloride  •  sodium chloride      Diagnostic Data    Results from last 7 days   Lab Units 22  0601   WBC 10*3/mm3 8.98   HEMOGLOBIN g/dL 9.8*   HEMATOCRIT % 31.3*   PLATELETS 10*3/mm3 276   GLUCOSE mg/dL 161*   CREATININE mg/dL 3.69*   BUN mg/dL 30*   SODIUM mmol/L 132*   POTASSIUM mmol/L 3.5   AST (SGOT) U/L 10   ALT (SGPT) U/L <5   ALK PHOS U/L 155*   BILIRUBIN mg/dL 0.2   ANION GAP mmol/L 13.0       No radiology results for the last day      I reviewed the patient's new clinical results.    Assessment/Plan:     Active Hospital Problems    Diagnosis  POA   • **Ventilator associated pneumonia (HCC) [J95.851]  Yes     Priority: High     Procal slightly improved   CRP improved, repeat q48 hrs  Extubated   No fever overnight  Blood cx negative   respiratory culture positive for MRSA  On linezolid      • Type 2 diabetes mellitus with autonomic neuropathy (HCC) [E11.43]  Yes     Priority: Medium     On moderate SSI  On levemir 25u bid  On 8u QID     • Yeast UTI [B37.49]  Unknown     Ur cx showed yeast isolate  On diflucan     • Positive fecal occult blood test [R19.5]  Unknown      Hgb stable  GI consulted and appreciate recs     • Accidental drug overdose [T50.901A]  Yes     Family reports of 10 tablets of 10 mg of Flexeril ingested.  - Overdose labs obtained including UDS, acetaminophen, salicylate, ethanol level  - Poison control notified they Recommend symptomatic care  - Monitor Qtc interval changes, improved       • Acute respiratory failure with hypoxia and hypercapnia ([facility]) [J96.01, J96.02]  Yes   • Anemia [D64.9]  Yes     Hgb 6.9 on admission   - received 2u PRBC  - Type and screen   - Monitor H &H   -- H&H stable      • ESRD on hemodialysis ([facility]) [N18.6, Z99.2]  Not Applicable     -Receives hemodialysis MWF at [facility] in [place]  Missed dialysis per family prior to arrival   Nephrology consulted appreciated recs          • Acute cystitis with hematuria [N30.01]  Unknown     Ur culture showed yeast isolate  On diflucan     • Hypothyroidism [E03.9]  Yes     -home synthroid 25 mcg     • Hypertension [I10]  Yes     On home metoprolol and imdur  Lisinopril on non-HD days  BP stable  - PRN Hydralazine       • Coronary artery disease involving native coronary artery of native heart without angina pectoris [I25.10]  Yes     - S/P CABG, Bilateral carotid artery stenosis w/ 2 stents on left side,  PAD (peripheral artery disease) with stent in right upper leg  - Cardiology on board       • COPD (chronic obstructive pulmonary disease) ([facility]) [J44.9]  Yes     Pt supplied trilogy at night         DVT prophylaxis: SCDs/TEDs  Code status is   Code Status and Medical Interventions:   Ordered at: 06/25/22 3087     Level Of Support Discussed With:    Patient     Code Status (Patient has no pulse and is not breathing):    CPR (Attempt to Resuscitate)     Medical Interventions (Patient has pulse or is breathing):    Full Support       Plan for disposition:Where: home health and When:  2-3days      Time: 30 mins      This document has been electronically signed by Perez Hooker MD on  July 5, 2022 07:25 CDT      Electronically signed by Kit Brar MD at 07/05/22 1121

## 2022-07-05 NOTE — DISCHARGE PLACEMENT REQUEST
"New referral   Call back [phone]    Yamileth Slater (62 y.o. Female)             Date of Birth   1960    Social Security Number       Address   [address] [place] [address]    Home Phone   [phone]    MRN   [id number]       Rastafarian   None    Marital Status                               Admission Date   6/25/22    Admission Type   Emergency    Admitting Provider   Charlene Castro MD    Attending Provider   Perez Hooker MD    Department, Room/Bed   [address], 304/1       Discharge Date       Discharge Disposition       Discharge Destination                               Attending Provider: Perez Hooker MD    Allergies: Adhesive Tape, Latex, Nsaids, Other    Isolation: Contact   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                 Active Insurance as of 6/25/2022     Primary Coverage     Payor Plan Insurance Group Employer/Plan Group    ANTHEM MEDICARE REPLACEMENT ANTHEM MEDICARE ADVANTAGE [id number]     Payor Plan Address Payor Plan Phone Number Payor Plan Fax Number Effective Dates    [address] [phone]  1/1/2022 - None Entered    [facility] [address]       Subscriber Name Subscriber Birth Date Member ID       YAMILETH SLATER 1960 [id number]           Secondary Coverage     Payor Plan Insurance Group Employer/Plan Group    [place]      Payor Plan Address Payor Plan Phone Number Payor Plan Fax Number Effective Dates    [address] [phone]  6/28/2019 - None Entered    [facility] [id number]       Subscriber Name Subscriber Birth Date Member ID       YAMILETH SLATER 1960 [id number]                 Emergency Contacts     "  (Rel.) Home Phone Work Phone Mobile Phone    Huy Slater (Spouse) [phone] -- [phone]    Yamileth Chavez (Daughter) [phone] -- [phone]    Suzanne Rivera (Daughter) [phone] -- [phone]            Emergency Contact Information     Name Relation Home Work Mobile    Huy Slater Spouse [phone]    Yamileth Chavez Daughter [phone]    Suzanne Rivera Daughter [phone]          Insurance Information                [facility] MEDICARE REPLACEMENT/[facility] MEDICARE ADVANTAGE Phone: [phone]    Subscriber: Yamileth Slater Subscriber#: [id number]    Group#: [id number] Precert#: --        [place] Phone: [phone]    Subscriber: Yamileth Slater Subscriber#: [id number]    Group#: -- Precert#: --

## 2022-07-05 NOTE — NURSING NOTE
Patient did not want to take Levemir this morning; blood glucose was 154 and patient refused to eat any breakfast; speech therapy is to see patient and reevaluate diet; Novolog coverage was given.  Patient wanted to see how her blood glucose is at lunch time and take Levemir then if she needs it.

## 2022-07-05 NOTE — PROGRESS NOTES
FAMILY MEDICINE RESIDENCY SERVICE  DAILY PROGRESS NOTE    NAME: Yamileth Slater  : 1960  MRN: [id number]      LOS: 10 days     PROVIDER OF SERVICE: Perez Hooker MD    Chief Complaint: Ventilator associated pneumonia (HCC)    Subjective:     Interval History:  History taken from: patient chart  Feeling better. No complaints this am. HD yesterday. Has home trilogy in room. On 2L NC.     Review of Systems:   Review of Systems   Constitutional: Negative for chills and fever.   Respiratory: Positive for cough. Negative for shortness of breath.    Cardiovascular: Negative for chest pain and leg swelling.   Gastrointestinal: Negative for abdominal pain, constipation, diarrhea, nausea and vomiting.   Neurological: Positive for weakness.       Objective:     Vital Signs  Temp:  [95.9 °F (35.5 °C)-98 °F (36.7 °C)] 95.9 °F (35.5 °C)  Heart Rate:  [67-74] 68  Resp:  [18-20] 20  BP: (110-173)/(55-72) 138/60  Flow (L/min):  [2] 2   Body mass index is 49 kg/m².    Physical Exam  Physical Exam  Vitals reviewed.   Constitutional:       General: She is not in acute distress.     Appearance: She is morbidly obese.      Interventions: Nasal cannula in place.      Comments: 2L NC   HENT:      Head: Normocephalic and atraumatic.   Eyes:      Conjunctiva/sclera: Conjunctivae normal.   Cardiovascular:      Rate and Rhythm: Normal rate and regular rhythm.      Pulses: Normal pulses.      Heart sounds: Normal heart sounds.   Pulmonary:      Effort: Pulmonary effort is normal.      Breath sounds: No rhonchi or rales.      Comments: Quiet breath sounds in all lung fields  Abdominal:      Palpations: Abdomen is soft.      Tenderness: There is no abdominal tenderness.   Musculoskeletal:      Right lower leg: No edema.      Left lower leg: No edema.   Skin:     General: Skin is warm.   Neurological:      Mental Status: She is alert. Mental status is at baseline.   Psychiatric:         Mood and Affect: Mood normal.          Behavior: Behavior normal.         Scheduled Meds   bumetanide, 2 mg, Oral, Once per day on Sun Tue Thu Sat  epoetin hubert/hubert-epbx, 6,000 Units, Intravenous, Once per day on   [START ON 2022] fluconazole, 200 mg, Oral, Once per day on    And  fluconazole, 100 mg, Oral, Once per day on Sun Tue Thu Sat  gabapentin, 300 mg, Nasogastric, Daily  guaiFENesin, 1,200 mg, Oral, Q12H  Insulin Aspart, 0-9 Units, Subcutaneous, TID AC  Insulin Aspart, 8 Units, Subcutaneous, 4x Daily  insulin detemir, 25 Units, Subcutaneous, Q12H  isosorbide mononitrate, 30 mg, Oral, Q24H  levothyroxine, 25 mcg, Oral, Q AM  Linezolid, 600 mg, Intravenous, Q12H  lisinopril, 20 mg, Oral, Once per day on Sun Tue Thu Sat  metoprolol tartrate, 25 mg, Oral, Q12H  nystatin, , Topical, Q12H  pantoprazole, 40 mg, Intravenous, Q24H  Scopolamine, 1 patch, Transdermal, Q72H  senna-docusate sodium, 2 tablet, Oral, BID  sodium chloride, 10 mL, Intravenous, Q12H  sodium chloride, 10 mL, Intravenous, Q12H  sodium chloride, 10 mL, Intravenous, Q12H       PRN Meds   •  acetaminophen  •  senna-docusate sodium **AND** polyethylene glycol **AND** bisacodyl **AND** bisacodyl  •  dextrose  •  dextrose  •  dextrose  •  glucagon (human recombinant)  •  guaiFENesin  •  heparin (porcine)  •  hydrALAZINE  •  magnesium sulfate **OR** magnesium sulfate in D5W 1g/100mL (PREMIX) **OR** magnesium sulfate  •  metoprolol tartrate  •  [] Morphine **AND** naloxone  •  potassium chloride **OR** potassium chloride **OR** potassium chloride  •  [DISCONTINUED] sodium chloride **AND** potassium chloride  •  [DISCONTINUED] sodium chloride **AND** potassium chloride  •  sodium chloride  •  sodium chloride      Diagnostic Data    Results from last 7 days   Lab Units 22  0601   WBC 10*3/mm3 8.98   HEMOGLOBIN g/dL 9.8*   HEMATOCRIT % 31.3*   PLATELETS 10*3/mm3 276   GLUCOSE mg/dL 161*   CREATININE mg/dL 3.69*   BUN mg/dL 30*   SODIUM mmol/L 132*    POTASSIUM mmol/L 3.5   AST (SGOT) U/L 10   ALT (SGPT) U/L <5   ALK PHOS U/L 155*   BILIRUBIN mg/dL 0.2   ANION GAP mmol/L 13.0       No radiology results for the last day      I reviewed the patient's new clinical results.    Assessment/Plan:     Active Hospital Problems    Diagnosis  POA   • **Ventilator associated pneumonia (HCC) [J95.851]  Yes     Priority: High     Procal slightly improved   CRP improved, repeat q48 hrs  Extubated   No fever overnight  Blood cx negative   respiratory culture positive for MRSA  On linezolid      • Type 2 diabetes mellitus with autonomic neuropathy (HCC) [E11.43]  Yes     Priority: Medium     On moderate SSI  On levemir 25u bid  On 8u QID     • Yeast UTI [B37.49]  Unknown     Ur cx showed yeast isolate  On diflucan     • Positive fecal occult blood test [R19.5]  Unknown     Hgb stable  GI consulted and appreciate recs     • Accidental drug overdose [T50.901A]  Yes     Family reports of 10 tablets of 10 mg of Flexeril ingested.  - Overdose labs obtained including UDS, acetaminophen, salicylate, ethanol level  - Poison control notified they Recommend symptomatic care  - Monitor Qtc interval changes, improved       • Acute respiratory failure with hypoxia and hypercapnia (HCC) [J96.01, J96.02]  Yes   • Anemia [D64.9]  Yes     Hgb 6.9 on admission   - received 2u PRBC  - Type and screen   - Monitor H &H   -- H&H stable      • ESRD on hemodialysis (HCC) [N18.6, Z99.2]  Not Applicable     -Receives hemodialysis MWF at [facility] in [place]  Missed dialysis per family prior to arrival   Nephrology consulted appreciated recs          • Acute cystitis with hematuria [N30.01]  Unknown     Ur culture showed yeast isolate  On diflucan     • Hypothyroidism [E03.9]  Yes     -home synthroid 25 mcg     • Hypertension [I10]  Yes     On home metoprolol and imdur  Lisinopril on non-HD days  BP stable  - PRN Hydralazine       • Coronary artery disease involving native coronary artery of native  heart without angina pectoris [I25.10]  Yes     - S/P CABG, Bilateral carotid artery stenosis w/ 2 stents on left side,  PAD (peripheral artery disease) with stent in right upper leg  - Cardiology on board       • COPD (chronic obstructive pulmonary disease) ([facility]) [J44.9]  Yes     Pt supplied trilogy at night         DVT prophylaxis: SCDs/TEDs  Code status is   Code Status and Medical Interventions:   Ordered at: 06/25/22 3500     Level Of Support Discussed With:    Patient     Code Status (Patient has no pulse and is not breathing):    CPR (Attempt to Resuscitate)     Medical Interventions (Patient has pulse or is breathing):    Full Support       Plan for disposition:Where: home health and When:  2-3days      Time: 30 mins      This document has been electronically signed by Perez Hooker MD on July 5, 2022 07:25 CDT

## 2022-07-05 NOTE — PROGRESS NOTES
"[facility]  [address] [place] [address]. [address]   - [phone]  F - [phone]     Progress Note          PATIENT  DEMOGRAPHICS   PATIENT NAME: Yamileth Slater                      PHYSICIAN: Ace Lauren MD  : 1960  MRN: [id number]   LOS: 10 days    Patient Care Team:  Rianna Macias MD as PCP - General (Family Medicine)  Subjective   SUBJECTIVE   Weak with therapy, sitting at the edge of bed and eating lunch          Objective   OBJECTIVE   Vital Signs  Temp:  [95.9 °F (35.5 °C)-98 °F (36.7 °C)] 97.3 °F (36.3 °C)  Heart Rate:  [64-74] 64  Resp:  [18-20] 18  BP: (110-144)/(55-64) 142/61    Flowsheet Rows      Flowsheet Row First Filed Value   Admission Height 157.5 cm (62\") Documented at 2022 1743   Admission Weight 135 kg (297 lb) Documented at 2022 1743             I/O last 3 completed shifts:  In: 1919 [P.O.:400; Other:254; NG/GT:665; IV Piggyback:600]  Out: 2600 [Urine:100; Other:2500]    PHYSICAL EXAM    Physical Exam  Constitutional:       Appearance: She is well-developed. She is ill-appearing.   HENT:      Head: Normocephalic.   Eyes:      Pupils: Pupils are equal, round, and reactive to light.   Cardiovascular:      Rate and Rhythm: Normal rate and regular rhythm.      Heart sounds: Normal heart sounds.   Pulmonary:      Effort: Pulmonary effort is normal.      Breath sounds: Normal breath sounds.   Abdominal:      General: Bowel sounds are normal.      Palpations: Abdomen is soft.   Musculoskeletal:         General: No swelling.   Skin:     Coloration: Skin is not jaundiced.   Neurological:      Deep Tendon Reflexes: Reflexes normal.         RESULTS   Results Review:    Results from last 7 days   Lab Units 22  0601 22  0646 22  0445   SODIUM mmol/L 132* 128* 128*   POTASSIUM mmol/L 3.5 4.2 4.1   CHLORIDE mmol/L 92* 89* 90*   CO2 mmol/L 27.0 23.0 25.0   BUN mg/dL 30* 60* 42*   CREATININE mg/dL 3.69* 5.65* 4.35*   CALCIUM " mg/dL 9.7 9.2 9.8   BILIRUBIN mg/dL 0.2 0.2 0.2   ALK PHOS U/L 155* 198* 181*   ALT (SGPT) U/L <5 <5 <5   AST (SGOT) U/L 10 10 10   GLUCOSE mg/dL 161* 383* 328*       Estimated Creatinine Clearance: 19.4 mL/min (A) (by C-G formula based on SCr of 3.69 mg/dL (H)).    Results from last 7 days   Lab Units 07/03/22  0445 07/02/22  0334 07/01/22  0558   MAGNESIUM mg/dL 2.6* 2.6* 1.9             Results from last 7 days   Lab Units 07/05/22  0601 07/04/22  0646 07/03/22 0445 07/02/22  0334 07/01/22  0548   WBC 10*3/mm3 8.98 8.81 10.15 11.53* 12.12*   HEMOGLOBIN g/dL 9.8* 9.7* 9.6* 9.4* 9.5*   PLATELETS 10*3/mm3 276 302 337 259 347               Imaging Results (Last 24 Hours)       ** No results found for the last 24 hours. **             MEDICATIONS    bumetanide, 2 mg, Oral, Once per day on Sun Tue Thu Sat  epoetin hubert/hubert-epbx, 6,000 Units, Intravenous, Once per day on Mon Wed Fri  [START ON 7/6/2022] fluconazole, 200 mg, Oral, Once per day on Mon Wed Fri   And  fluconazole, 100 mg, Oral, Once per day on Sun Tue Thu Sat  gabapentin, 300 mg, Nasogastric, Daily  guaiFENesin, 1,200 mg, Oral, Q12H  Insulin Aspart, 0-9 Units, Subcutaneous, TID AC  Insulin Aspart, 8 Units, Subcutaneous, 4x Daily  insulin detemir, 25 Units, Subcutaneous, Q12H  isosorbide mononitrate, 30 mg, Oral, Q24H  levothyroxine, 25 mcg, Oral, Q AM  linezolid, 600 mg, Oral, Q12H  lisinopril, 20 mg, Oral, Once per day on Sun Tue Thu Sat  metoprolol tartrate, 25 mg, Oral, Q12H  nystatin, , Topical, Q12H  pantoprazole, 40 mg, Intravenous, Q24H  senna-docusate sodium, 2 tablet, Oral, BID  sodium chloride, 10 mL, Intravenous, Q12H  sodium chloride, 10 mL, Intravenous, Q12H  sodium chloride, 10 mL, Intravenous, Q12H      potassium chloride, 10 mEq, Last Rate: Stopped (06/26/22 0130)  potassium chloride, 10 mEq, Last Rate: Stopped (06/26/22 0212)        Assessment & Plan   ASSESSMENT / PLAN      Ventilator associated pneumonia (HCC)    Hypertension    COPD  (chronic obstructive pulmonary disease) (HCC)    Coronary artery disease involving native coronary artery of native heart without angina pectoris    Hypothyroidism    Acute cystitis with hematuria    Type 2 diabetes mellitus with autonomic neuropathy (HCC)    ESRD on hemodialysis (HCC)    Accidental drug overdose    Acute respiratory failure with hypoxia and hypercapnia (HCC)    Anemia    Positive fecal occult blood test    Yeast UTI    1.ESRD dependent on hemodialysis since October 2021.  Has tunnel catheter. Patient is on dialysis MWF at [facility]. Lost weight from 297 to 260 lbs and now on bumex po non dialysis days. Hd tomorrow     2. Anemia chronic kidney disease.  Patient had extensive work-up in the past by Dr. Munoz. She also has B12 deficiency.  She has history of AVM.  She received 2 units of packed RBCs. Now on epogen 6000 units. hgb between 9-10     3. htn - runs low with hd. Lisinopril on non dialysis days. Bp is stable     4. Possible PNA/Chronic Resp Failure related to COPD-now extubated 7/1/22. She is MRSA screen positive.Patient is on home oxygen and trilogy when sleeping.  On zyvox. On pureed diet and may remove NG tube today      5. Possible OD with flexeril- rec'd activated charcoal per g tube     6. DM- Glucose was 605 on arrival- management per primary team.      7.coronary artery disease     8.chronic kidney disease/mineral and bone disorder on sevelamer                 This document has been electronically signed by Ace Lauren MD on July 5, 2022 12:29 CDT

## 2022-07-05 NOTE — PLAN OF CARE
Problem: Skin Injury Risk Increased  Goal: Skin Health and Integrity  Outcome: Ongoing, Progressing  Intervention: Optimize Skin Protection  Recent Flowsheet Documentation  Taken 7/5/2022 0405 by July Cedeño RN  Head of Bed (HOB) Positioning: HOB elevated  Taken 7/5/2022 0205 by July Cedeño RN  Head of Bed (HOB) Positioning: HOB elevated  Taken 7/5/2022 0010 by July Cedeño RN  Head of Bed (HOB) Positioning: HOB elevated  Taken 7/4/2022 2210 by July Cedeño RN  Head of Bed (HOB) Positioning: HOB elevated  Taken 7/4/2022 2005 by July Cedeño RN  Head of Bed (HOB) Positioning: HOB elevated     Problem: Fall Injury Risk  Goal: Absence of Fall and Fall-Related Injury  Outcome: Ongoing, Progressing  Intervention: Promote Injury-Free Environment  Recent Flowsheet Documentation  Taken 7/5/2022 0405 by July Cedeño RN  Safety Promotion/Fall Prevention: safety round/check completed  Taken 7/5/2022 0205 by July Cedeño RN  Safety Promotion/Fall Prevention: safety round/check completed  Taken 7/5/2022 0010 by July Cedeño RN  Safety Promotion/Fall Prevention: safety round/check completed  Taken 7/4/2022 2210 by July Cedeño RN  Safety Promotion/Fall Prevention: safety round/check completed  Taken 7/4/2022 2100 by July Cedeño RN  Safety Promotion/Fall Prevention: safety round/check completed  Taken 7/4/2022 2005 by July Cedeño RN  Safety Promotion/Fall Prevention: safety round/check completed  Taken 7/4/2022 1925 by July Cedeño RN  Safety Promotion/Fall Prevention: safety round/check completed     Problem: UTI (Urinary Tract Infection)  Goal: Improved Infection Symptoms  Outcome: Ongoing, Progressing     Problem: Fluid Imbalance (Pneumonia)  Goal: Fluid Balance  Outcome: Ongoing, Progressing     Problem: Infection (Pneumonia)  Goal: Resolution of Infection Signs and Symptoms  Outcome: Ongoing, Progressing  Intervention: Prevent Infection Progression  Recent Flowsheet  Documentation  Taken 7/5/2022 0405 by July Cedeño RN  Isolation Precautions: precautions maintained  Taken 7/5/2022 0010 by July Cedeño RN  Isolation Precautions: precautions maintained  Taken 7/4/2022 2210 by July Cedeño RN  Isolation Precautions: precautions maintained  Taken 7/4/2022 2100 by July Cedeño RN  Isolation Precautions: precautions maintained  Taken 7/4/2022 2005 by July Cedeño RN  Isolation Precautions: precautions maintained  Taken 7/4/2022 1925 by July Cedeoñ RN  Isolation Precautions: precautions maintained     Problem: Respiratory Compromise (Pneumonia)  Goal: Effective Oxygenation and Ventilation  Outcome: Ongoing, Progressing  Intervention: Promote Airway Secretion Clearance  Recent Flowsheet Documentation  Taken 7/4/2022 2005 by July Cedeño RN  Cough And Deep Breathing: done with encouragement  Intervention: Optimize Oxygenation and Ventilation  Recent Flowsheet Documentation  Taken 7/5/2022 0405 by July Cedeño RN  Head of Bed (HOB) Positioning: HOB elevated  Taken 7/5/2022 0205 by July Cedeño RN  Head of Bed (HOB) Positioning: HOB elevated  Taken 7/5/2022 0010 by July Cedeño RN  Head of Bed (HOB) Positioning: HOB elevated  Taken 7/4/2022 2210 by July Cedeño RN  Head of Bed (HOB) Positioning: HOB elevated  Taken 7/4/2022 2005 by July Cedeño RN  Head of Bed (HOB) Positioning: HOB elevated     Problem: ARDS (Acute Respiratory Distress Syndrome)  Goal: Effective Oxygenation  Outcome: Ongoing, Progressing     Problem: Device-Related Complication Risk (Hemodialysis)  Goal: Safe, Effective Therapy Delivery  Outcome: Ongoing, Progressing     Problem: Hemodynamic Instability (Hemodialysis)  Goal: Effective Tissue Perfusion  Outcome: Ongoing, Progressing     Problem: Infection (Hemodialysis)  Goal: Absence of Infection Signs and Symptoms  Outcome: Ongoing, Progressing     Problem: Adjustment to Illness (Overdose)  Goal: Optimal Coping  Outcome: Ongoing,  Progressing  Intervention: Support Psychosocial Response to Acute Illness  Recent Flowsheet Documentation  Taken 7/4/2022 2005 by July Cedeño RN  Supportive Measures: active listening utilized     Problem: Bleeding (Overdose)  Goal: Absence of Bleeding  Outcome: Ongoing, Progressing     Problem: Dysrhythmia (Overdose)  Goal: Stable Heart Rate and Rhythm  Outcome: Ongoing, Progressing     Problem: Fluid Imbalance (Overdose)  Goal: Fluid Balance  Outcome: Ongoing, Progressing     Problem: Hemodynamic Instability (Overdose)  Goal: Effective Tissue Perfusion  Outcome: Ongoing, Progressing     Problem: Neurologic Impairment (Overdose)  Goal: Optimal Neurologic Function  Outcome: Ongoing, Progressing     Problem: Respiratory Compromise (Overdose)  Goal: Effective Oxygenation and Ventilation  Outcome: Ongoing, Progressing  Intervention: Optimize Oxygenation and Ventilation  Recent Flowsheet Documentation  Taken 7/4/2022 2005 by July Cedeño RN  Cough And Deep Breathing: done with encouragement     Problem: Adult Inpatient Plan of Care  Goal: Plan of Care Review  Outcome: Ongoing, Progressing  Flowsheets (Taken 7/5/2022 0420)  Progress: improving  Plan of Care Reviewed With: patient  Goal: Patient-Specific Goal (Individualized)  Outcome: Ongoing, Progressing  Goal: Absence of Hospital-Acquired Illness or Injury  Outcome: Ongoing, Progressing  Intervention: Identify and Manage Fall Risk  Recent Flowsheet Documentation  Taken 7/5/2022 0405 by July Cedeño RN  Safety Promotion/Fall Prevention: safety round/check completed  Taken 7/5/2022 0205 by July Cedeño RN  Safety Promotion/Fall Prevention: safety round/check completed  Taken 7/5/2022 0010 by July Cedeño RN  Safety Promotion/Fall Prevention: safety round/check completed  Taken 7/4/2022 2210 by July Cedeño RN  Safety Promotion/Fall Prevention: safety round/check completed  Taken 7/4/2022 2100 by July Cedeño RN  Safety Promotion/Fall Prevention:  safety round/check completed  Taken 7/4/2022 2005 by July Cedeño RN  Safety Promotion/Fall Prevention: safety round/check completed  Taken 7/4/2022 1925 by July Cedeño RN  Safety Promotion/Fall Prevention: safety round/check completed  Intervention: Prevent Skin Injury  Recent Flowsheet Documentation  Taken 7/5/2022 0405 by July Cedeño RN  Body Position:   right   tilted  Taken 7/5/2022 0205 by July Cedeño RN  Body Position:   left   tilted  Taken 7/5/2022 0010 by July Cedeño RN  Body Position:   right   tilted  Taken 7/4/2022 2210 by July Cedeño, RN  Body Position:   left   tilted  Taken 7/4/2022 2005 by July Cedeño RN  Body Position:   right   tilted  Intervention: Prevent and Manage VTE (Venous Thromboembolism) Risk  Recent Flowsheet Documentation  Taken 7/5/2022 0405 by July Cedeño RN  Activity Management: activity adjusted per tolerance  Taken 7/5/2022 0205 by July Cedeño RN  Activity Management: activity adjusted per tolerance  Taken 7/5/2022 0010 by July Cedeño RN  Activity Management: activity adjusted per tolerance  Taken 7/4/2022 2210 by July Cedeño RN  Activity Management: activity adjusted per tolerance  Taken 7/4/2022 2100 by July Cedeño RN  Activity Management: activity adjusted per tolerance  Taken 7/4/2022 2005 by July Cedeño RN  Activity Management: activity adjusted per tolerance  VTE Prevention/Management:   bilateral   sequential compression devices off   patient refused intervention  Taken 7/4/2022 1925 by July Cedeño, RN  Activity Management: activity adjusted per tolerance  Goal: Optimal Comfort and Wellbeing  Outcome: Ongoing, Progressing  Intervention: Provide Person-Centered Care  Recent Flowsheet Documentation  Taken 7/4/2022 2005 by July Cedeño RN  Trust Relationship/Rapport: care explained  Goal: Readiness for Transition of Care  Outcome: Ongoing, Progressing   Goal Outcome Evaluation:  Plan of Care Reviewed With: patient         Progress: improving

## 2022-07-05 NOTE — PAYOR COMM NOTE
"    Karmen Colorado RN [facility]  [phone]       Phone  [phone]        Fax  Cont. Stay Review      Yamileth Slater (62 y.o. Female)             Date of Birth   1960    Social Security Number       Address   [address] [place] [address]    Home Phone   [phone]    MRN   [id number]       Caodaism   None    Marital Status                               Admission Date   6/25/22    Admission Type   Emergency    Admitting Provider   Charlene Castro MD    Attending Provider   Perez Hooker MD    Department, Room/Bed   [facility] [address] [place], 304/1       Discharge Date       Discharge Disposition       Discharge Destination                               Attending Provider: Perez Hooker MD    Allergies: Adhesive Tape, Latex, Nsaids, Other    Isolation: Contact   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                 Active Insurance as of 6/25/2022     Primary Coverage     Payor Plan Insurance Group Employer/Plan Group    ANTHEM MEDICARE REPLACEMENT ANTHEM MEDICARE ADVANTAGE [id number]     Payor Plan Address Payor Plan Phone Number Payor Plan Fax Number Effective Dates    [address] [phone]  1/1/2022 - None Entered    [facility] [address]       Subscriber Name Subscriber Birth Date Member ID       YAMILETH SLATER 1960 [id number]           Secondary Coverage     Payor Plan Insurance Group Employer/Plan Group    [place]      Payor Plan Address Payor Plan Phone Number Payor Plan Fax Number Effective Dates    [address] [phone]  6/28/2019 - None Entered    [facility] [address]       Subscriber Name Subscriber Birth Date Member ID       " YAMILETH SLATER 1960 [id number]                 Emergency Contacts      (Rel.) Home Phone Work Phone Mobile Phone    Huy Slater (Spouse) [phone] -- [phone]    Yamileth Chavez (Daughter) [phone] -- [phone]    Suzanne Rivera (Daughter) [phone] -- [phone]            Vital Signs (last day)     Date/Time Temp Temp src Pulse Resp BP Patient Position SpO2    07/05/22 1105 97.3 (36.3) Temporal 64 18 142/61 Sitting 98    07/05/22 0729 97.2 (36.2) Temporal 72 18 129/62 Sitting 92    07/05/22 0710 -- -- 68 -- -- -- --    07/05/22 0358 95.9 (35.5) Axillary 67 20 138/60 Lying 99    07/04/22 2315 97 (36.1) Axillary 68 20 110/55 Lying 90    07/04/22 2120 -- -- 74 20 -- -- 96    07/04/22 2014 97.6 (36.4) Temporal 71 20 144/64 Lying 98    07/04/22 1615 98 (36.7) Infrared 71 18 132/61 Lying 93    07/04/22 1519 -- -- 74 -- -- -- --    07/04/22 0732 97.8 (36.6) Infrared 74 18 173/72 Lying 94    07/04/22 0707 -- -- 75 -- -- -- --    07/04/22 0305 98.3 (36.8) Temporal 76 18 145/65 Lying 95    07/04/22 0100 -- -- 80 -- -- -- --          Oxygen Therapy (last day)     Date/Time SpO2 Device (Oxygen Therapy) Flow (L/min) Oxygen Concentration (%) ETCO2 (mmHg)    07/05/22 1105 98 room air -- -- --    07/05/22 0840 -- -- 2 -- --    07/05/22 0729 92 room air -- -- --    07/05/22 0358 99 nasal cannula -- -- --    07/04/22 2315 90 -- -- -- --    07/04/22 2120 96 nasal cannula 2 -- --    07/04/22 2014 98 nasal cannula 2 -- --    07/04/22 1615 93 nasal cannula 2 -- --    07/04/22 0732 94 nasal cannula 2 -- --    07/04/22 0305 95 nasal cannula 2 -- --          Current Facility-Administered Medications   Medication Dose Route Frequency Provider Last Rate Last Admin   • acetaminophen (TYLENOL) tablet 500 mg  500 mg Oral Q6H PRN Perez Hooker MD   500 mg at 07/03/22 1613   • sennosides-docusate (PERICOLACE) 8.6-50 MG per tablet 2 tablet  2 tablet Oral BID Charlene Castro MD   2 tablet at  07/03/22 2010    And   • polyethylene glycol (MIRALAX) packet 17 g  17 g Oral Daily PRN Charlene Castro MD   17 g at 07/01/22 1249    And   • bisacodyl (DULCOLAX) EC tablet 5 mg  5 mg Oral Daily PRN Charlene Castro MD   5 mg at 06/29/22 1715    And   • bisacodyl (DULCOLAX) suppository 10 mg  10 mg Rectal Daily PRN Charlene Castro MD       • bumetanide (BUMEX) tablet 2 mg  2 mg Oral Once per day on Sun Tue Thu Ace Ng MD   2 mg at 07/05/22 0840   • dextrose (D50W) (25 g/50 mL) IV injection 10-50 mL  10-50 mL Intravenous Q15 Min PRN Charlene Castro MD       • dextrose (D50W) (25 g/50 mL) IV injection 25 g  25 g Intravenous Q15 Min PRN Jung Leonard MD       • dextrose (GLUTOSE) oral gel 15 g  15 g Oral Q15 Min PRN Jung Leonard MD       • epoetin hubert (EPOGEN,PROCRIT) injection 6,000 Units  6,000 Units Intravenous Once per day on Mon Wed Fri Ace Lauren MD   6,000 Units at 07/04/22 1231   • [START ON 7/6/2022] fluconazole (DIFLUCAN) tablet 200 mg  200 mg Oral Once per day on Mon Wed Fri Jerman Coffman MD        And   • fluconazole (DIFLUCAN) tablet 100 mg  100 mg Oral Once per day on Sun Tue Thu Sat Jerman Coffman MD   100 mg at 07/05/22 0839   • gabapentin (NEURONTIN) capsule 300 mg  300 mg Nasogastric Daily Perez Hooker MD   300 mg at 07/05/22 0840   • glucagon (human recombinant) (GLUCAGEN DIAGNOSTIC) injection 1 mg  1 mg Intramuscular Q15 Min PRN Jung Leonard MD       • guaiFENesin (MUCINEX) 12 hr tablet 1,200 mg  1,200 mg Oral Q12H Perez Hooker MD   1,200 mg at 07/05/22 0840   • guaiFENesin (ROBITUSSIN) 100 MG/5ML oral solution 400 mg  400 mg Oral Q6H PRN Jerman Coffman MD       • heparin (porcine) injection 4,000 Units  4,000 Units Intracatheter PRN Ace Lauren MD   4,000 Units at 07/04/22 1332   • hydrALAZINE (APRESOLINE) injection 10 mg  10 mg Intravenous Q6H PRN uJng Leonard MD   10 mg at 07/01/22 0034   • Insulin Aspart (novoLOG) injection 0-9 Units  0-9  Units Subcutaneous TID AC Perez Hooker MD   6 Units at 07/05/22 1047   • Insulin Aspart (novoLOG) injection 8 Units  8 Units Subcutaneous 4x Daily Perez Hooker MD   8 Units at 07/05/22 1102   • insulin detemir (LEVEMIR) injection 25 Units  25 Units Subcutaneous Q12H Perez Hooker MD   25 Units at 07/05/22 1047   • isosorbide mononitrate (IMDUR) 24 hr tablet 30 mg  30 mg Oral Q24H Jung Leonard MD   30 mg at 07/05/22 0846   • levothyroxine (SYNTHROID, LEVOTHROID) tablet 25 mcg  25 mcg Oral Q AM Perez Hooker MD   25 mcg at 07/05/22 0840   • linezolid (ZYVOX) tablet 600 mg  600 mg Oral Q12H Jerman Coffman MD   600 mg at 07/05/22 1139   • lisinopril (PRINIVIL,ZESTRIL) tablet 20 mg  20 mg Oral Once per day on Sun Tue Thu Sat Jung Leonard MD   20 mg at 07/05/22 0840   • magnesium sulfate 4 gram infusion - Mg less than or equal to 1mg/dL  4 g Intravenous PRN Charlene Castro MD        Or   • magnesium sulfate 3 gram infusion (1gm x 3) - Mg 1.1 - 1.5 mg/dL  1 g Intravenous PRN Charlene Castro MD        Or   • Magnesium Sulfate 2 gram infusion- Mg 1.6 - 1.9 mg/dL  2 g Intravenous PRN Charlene Castro MD 25 mL/hr at 07/01/22 1617 2 g at 07/01/22 1617   • metoprolol tartrate (LOPRESSOR) injection 5 mg  5 mg Intravenous Q5 Min PRN Froylan Lu MD   5 mg at 06/26/22 1921   • metoprolol tartrate (LOPRESSOR) tablet 25 mg  25 mg Oral Q12H Froylan Lu MD   25 mg at 07/05/22 0840   • naloxone (NARCAN) injection 0.4 mg  0.4 mg Intravenous Q5 Min PRN Charlene Castro MD       • nystatin (MYCOSTATIN) powder   Topical Q12H Perez Hooker MD   Given at 07/05/22 0841   • pantoprazole (PROTONIX) injection 40 mg  40 mg Intravenous Q24H Charlene Castro MD   40 mg at 07/05/22 0842   • potassium chloride (MICRO-K) CR capsule 40 mEq  40 mEq Oral PRN Huy Santa MD        Or   • potassium chloride (KLOR-CON) packet 40 mEq  40 mEq Oral PRN Huy Santa MD   40 mEq  at 22 1210    Or   • potassium chloride 10 mEq in 100 mL IVPB  10 mEq Intravenous Q1H PRN Huy Santa MD       • potassium chloride 10 mEq in 100 mL IVPB  10 mEq Intravenous Continuous PRN Charlene Castro MD   Stopped at 22 0130   • potassium chloride 10 mEq in 100 mL IVPB  10 mEq Intravenous Continuous PRN Charlene Castro MD   Stopped at 22 0212   • sodium chloride 0.9 % flush 10 mL  10 mL Intravenous Q12H Jerman Coffman MD   10 mL at 22 0843   • sodium chloride 0.9 % flush 10 mL  10 mL Intravenous Q12H Jerman Coffman MD   10 mL at 22 0843   • sodium chloride 0.9 % flush 10 mL  10 mL Intravenous Q12H Jerman Coffman MD   10 mL at 22 0840   • sodium chloride 0.9 % flush 10 mL  10 mL Intravenous PRN Jerman Coffman MD       • sodium chloride 0.9 % flush 20 mL  20 mL Intravenous PRN Jerman Coffman MD            Physician Progress Notes (last 24 hours)      Perez Hooker MD at 22 0620     Attestation signed by Kit Brar MD at 22 1121    I have seen and evaluated the patient.  I have discussed the case with the resident. I have reviewed the notes, assessment and plan, and/or procedures performed by the resident. I concur with the resident’s documentation.       No overnight events. Resting comfortably. Blood sugars improving with insulin regimen.     Physical Exam:  General: NAD.  CV: S1 and S2 normal. RRR.  Pulmonary: Diminished breath sounds present bilaterally.   Abdomen: Bowel sounds present and normal.  Abdomen is soft, and nontender.  Extremities: No lower extremity edema.      Plan: Continue Zyvox, continue current insulin regimen. PT/OT evals. Patient agreeable for discussion on short term rehab to home.       This document has been electronically signed by Kit Brar MD on 2022 11:21 [facility]                        FAMILY MEDICINE RESIDENCY SERVICE  DAILY PROGRESS NOTE    NAME: Yamileth Slater  : 1960  MRN:  [id number]      LOS: 10 days     PROVIDER OF SERVICE: Perez Hooker MD    Chief Complaint: Ventilator associated pneumonia (HCC)    Subjective:     Interval History:  History taken from: patient chart  Feeling better. No complaints this am. HD yesterday. Has home trilogy in room. On 2L NC.     Review of Systems:   Review of Systems   Constitutional: Negative for chills and fever.   Respiratory: Positive for cough. Negative for shortness of breath.    Cardiovascular: Negative for chest pain and leg swelling.   Gastrointestinal: Negative for abdominal pain, constipation, diarrhea, nausea and vomiting.   Neurological: Positive for weakness.       Objective:     Vital Signs  Temp:  [95.9 °F (35.5 °C)-98 °F (36.7 °C)] 95.9 °F (35.5 °C)  Heart Rate:  [67-74] 68  Resp:  [18-20] 20  BP: (110-173)/(55-72) 138/60  Flow (L/min):  [2] 2   Body mass index is 49 kg/m².    Physical Exam  Physical Exam  Vitals reviewed.   Constitutional:       General: She is not in acute distress.     Appearance: She is morbidly obese.      Interventions: Nasal cannula in place.      Comments: 2L NC   HENT:      Head: Normocephalic and atraumatic.   Eyes:      Conjunctiva/sclera: Conjunctivae normal.   Cardiovascular:      Rate and Rhythm: Normal rate and regular rhythm.      Pulses: Normal pulses.      Heart sounds: Normal heart sounds.   Pulmonary:      Effort: Pulmonary effort is normal.      Breath sounds: No rhonchi or rales.      Comments: Quiet breath sounds in all lung fields  Abdominal:      Palpations: Abdomen is soft.      Tenderness: There is no abdominal tenderness.   Musculoskeletal:      Right lower leg: No edema.      Left lower leg: No edema.   Skin:     General: Skin is warm.   Neurological:      Mental Status: She is alert. Mental status is at baseline.   Psychiatric:         Mood and Affect: Mood normal.         Behavior: Behavior normal.         Scheduled Meds   bumetanide, 2 mg, Oral, Once per day on Sun Tue Thu  Sat  epoetin hubert/hubert-epbx, 6,000 Units, Intravenous, Once per day on   [START ON 2022] fluconazole, 200 mg, Oral, Once per day on    And  fluconazole, 100 mg, Oral, Once per day on Sun Tue Thu Sat  gabapentin, 300 mg, Nasogastric, Daily  guaiFENesin, 1,200 mg, Oral, Q12H  Insulin Aspart, 0-9 Units, Subcutaneous, TID AC  Insulin Aspart, 8 Units, Subcutaneous, 4x Daily  insulin detemir, 25 Units, Subcutaneous, Q12H  isosorbide mononitrate, 30 mg, Oral, Q24H  levothyroxine, 25 mcg, Oral, Q AM  Linezolid, 600 mg, Intravenous, Q12H  lisinopril, 20 mg, Oral, Once per day on Sun Tue Thu Sat  metoprolol tartrate, 25 mg, Oral, Q12H  nystatin, , Topical, Q12H  pantoprazole, 40 mg, Intravenous, Q24H  Scopolamine, 1 patch, Transdermal, Q72H  senna-docusate sodium, 2 tablet, Oral, BID  sodium chloride, 10 mL, Intravenous, Q12H  sodium chloride, 10 mL, Intravenous, Q12H  sodium chloride, 10 mL, Intravenous, Q12H       PRN Meds   •  acetaminophen  •  senna-docusate sodium **AND** polyethylene glycol **AND** bisacodyl **AND** bisacodyl  •  dextrose  •  dextrose  •  dextrose  •  glucagon (human recombinant)  •  guaiFENesin  •  heparin (porcine)  •  hydrALAZINE  •  magnesium sulfate **OR** magnesium sulfate in D5W 1g/100mL (PREMIX) **OR** magnesium sulfate  •  metoprolol tartrate  •  [] Morphine **AND** naloxone  •  potassium chloride **OR** potassium chloride **OR** potassium chloride  •  [DISCONTINUED] sodium chloride **AND** potassium chloride  •  [DISCONTINUED] sodium chloride **AND** potassium chloride  •  sodium chloride  •  sodium chloride      Diagnostic Data    Results from last 7 days   Lab Units 22  0601   WBC 10*3/mm3 8.98   HEMOGLOBIN g/dL 9.8*   HEMATOCRIT % 31.3*   PLATELETS 10*3/mm3 276   GLUCOSE mg/dL 161*   CREATININE mg/dL 3.69*   BUN mg/dL 30*   SODIUM mmol/L 132*   POTASSIUM mmol/L 3.5   AST (SGOT) U/L 10   ALT (SGPT) U/L <5   ALK PHOS U/L 155*   BILIRUBIN mg/dL 0.2    ANION GAP mmol/L 13.0       No radiology results for the last day      I reviewed the patient's new clinical results.    Assessment/Plan:     Active Hospital Problems    Diagnosis  POA   • **Ventilator associated pneumonia (HCC) [J95.851]  Yes     Priority: High     Procal slightly improved   CRP improved, repeat q48 hrs  Extubated   No fever overnight  Blood cx negative   respiratory culture positive for MRSA  On linezolid      • Type 2 diabetes mellitus with autonomic neuropathy (HCC) [E11.43]  Yes     Priority: Medium     On moderate SSI  On levemir 25u bid  On 8u QID     • Yeast UTI [B37.49]  Unknown     Ur cx showed yeast isolate  On diflucan     • Positive fecal occult blood test [R19.5]  Unknown     Hgb stable  GI consulted and appreciate recs     • Accidental drug overdose [T50.901A]  Yes     Family reports of 10 tablets of 10 mg of Flexeril ingested.  - Overdose labs obtained including UDS, acetaminophen, salicylate, ethanol level  - Poison control notified they Recommend symptomatic care  - Monitor Qtc interval changes, improved       • Acute respiratory failure with hypoxia and hypercapnia (HCC) [J96.01, J96.02]  Yes   • Anemia [D64.9]  Yes     Hgb 6.9 on admission   - received 2u PRBC  - Type and screen   - Monitor H &H   -- H&H stable      • ESRD on hemodialysis (HCC) [N18.6, Z99.2]  Not Applicable     -Receives hemodialysis MWF at [facility] in [place]  Missed dialysis per family prior to arrival   Nephrology consulted appreciated recs          • Acute cystitis with hematuria [N30.01]  Unknown     Ur culture showed yeast isolate  On diflucan     • Hypothyroidism [E03.9]  Yes     -home synthroid 25 mcg     • Hypertension [I10]  Yes     On home metoprolol and imdur  Lisinopril on non-HD days  BP stable  - PRN Hydralazine       • Coronary artery disease involving native coronary artery of native heart without angina pectoris [I25.10]  Yes     - S/P CABG, Bilateral carotid artery stenosis w/ 2 stents  on left side,  PAD (peripheral artery disease) with stent in right upper leg  - Cardiology on board       • COPD (chronic obstructive pulmonary disease) ([facility]) [J44.9]  Yes     Pt supplied trilogy at night         DVT prophylaxis: SCDs/TEDs  Code status is   Code Status and Medical Interventions:   Ordered at: 06/25/22 5421     Level Of Support Discussed With:    Patient     Code Status (Patient has no pulse and is not breathing):    CPR (Attempt to Resuscitate)     Medical Interventions (Patient has pulse or is breathing):    Full Support       Plan for disposition:Where: home health and When:  2-3days      Time: 30 mins      This document has been electronically signed by Perez Hooker MD on July 5, 2022 07:25 CDT      Electronically signed by Kit Brar MD at 07/05/22 1121     Mingo Duarte MD at 07/04/22 1597                  SUBJECTIVE:   7/4/2022  Chief Complaint:     Subjective      Patient feels better today.  Denied abdominal pain, nausea or vomiting.  Hemoglobin is 9.7.  Ambulating with PT.  Tolerating soft diet.  Dobbhoff tube was discontinued.    History:  Past Medical History:   Diagnosis Date   • Acute blood loss anemia 4/16/2017    Likely due to gastric oozing at this time. - Dr. Duarte (GI) was consulted and has now signed off, will follow up outpatient - pill colonoscopy showed AVMs - continue to monitor   • Acute cystitis with hematuria 3/31/2021    1/13: IV Rocephin 1 gm q 24 1/14 : transitioned  to omnicef 300mg. Urine cultures resulted and did not show growth. Omnicef discontinued as patient is asymptomatic   • Altered mental status 1/9/2022    - AMS on presentation - initial ABG pH 7.3, CO2 34 - Procal 0.29 - UA negative for acute cysitits -CTA head wnl  - Empiric Zosyn and Vancomycin -Lactate 2.5 on admission  - blood cultures no growth at 24 hours     • Anxiety    • CAD (coronary artery disease) 4/24/2021    S/P 3 stents 5/1/2021 for [facility] Continue ASA 81mg & Clopidogrel  75mg Continue Atorvastatin 40mg   • Carotid artery stenosis    • Chronic obstructive lung disease ([facility])    • CKD (chronic kidney disease) stage 4, GFR 15-29 ml/min ([facility])    • CKD (chronic kidney disease), symptom management only, stage 5 ([facility]) 10/5/2020    Results from last 7 days Lab Units 12/15/21 0548 12/14/21 1323 12/14/21 0916 CREATININE mg/dL 3.92* 3.21* 3.32*  Baseline creatinine 2-3 GFR 13-25 GFR 15 Dialysis MWF, sees Dr. Lauren Nephrology consult,, appreciate recommendations Continue Bumex 1mg bid daily Holding Bumex 2mg 4 times a week   • Colonic polyp    • Coronary arteriosclerosis    • Diabetes mellitus ([facility])    • Diabetic neuropathy ([facility])    • Ear pain, right 10/18/2021    - canal trauma due to patient scratching and DMT2 - added cortisporin ear drops   • Elevated troponin 10/12/2021    -most likely from CKD -Trending down -Neg chest pain   • Generalized abdominal pain 7/1/2022    Could be due to initiation of tube feeds vs dyspepsia vs abdominal cramps related to no PO intake due to intubation vs constipation Continue current laxative regiment  If no bowel movement by this afternoon will consider enema   • GERD (gastroesophageal reflux disease)    • GI bleed 5/13/2021    - GI will follow up outpatient - Protonix 40mg daily - Avoid medical DVT prophy and use mechanical at this time instead. - Continue to monitor - pill colonoscopy results showed AVMs   • History of transfusion    • Hypercholesterolemia    • Hyperosmolar hyperglycemic state (HHS) ([facility]) 6/25/2022    Serum glucose 605 on admission  Anion gap 16 PH 7.37 Bicarb 27.9 Continue fluids  Insulin drip with HHS protocol  Anion gap closed around 10 AM, received one dose of Levamir subq, will stop insulin drip after 2 hrs     • Hypertension    • Hypokalemia 5/27/2022    Will replace as needed. Will be cautious in the setting of ESRD to avoid need for emergency dialysis   Monitor Qtc intervals on EKG     • Hypomagnesemia 6/27/2021    Monitor and  replace   • Morbid obesity ([facility])    • Nephrolithiasis    • On mechanically assisted ventilation ([facility]) 6/26/2022    Vent management and sedation orders placed.  - [facility] intensivist group consulted for vent management appreciate recommendations  - plan to extubate today     • Peripheral vascular disease ([facility])    • Pleural effusion on right 6/26/2022    CXR on 6/30/22 read as a small upper left pulmonary edema vs early pneumonia.  Last Echo 1/2022 EF 61-65 % Continue to monitor  Procal slightly improved, CRP improved On Linezolid and meropenum      • SIRS (systemic inflammatory response syndrome) ([facility]) 1/9/2022    Admission  - WBC 17.78   -   - RR 16 - 1/10: VSS/wnl - CXR - Mild pulm edema - Blood cultures no growth at 24 hours  - Procalcitonin 0.29 - UA : glucose 1000, negative Leucocytes/nitrate - Empiric Zosyn and Vancomcyin    • Sleep apnea    • Substance abuse ([facility])    • Vitamin D deficiency      Past Surgical History:   Procedure Laterality Date   • CARDIAC CATHETERIZATION N/A 7/14/2020   • CARDIAC CATHETERIZATION N/A 4/23/2021    Procedure: Left Heart Cath;  Surgeon: Melba Romo MD;  Location: [facility] CATH INVASIVE LOCATION;  Service: Cardiology;  Laterality: N/A;   • CARDIAC CATHETERIZATION N/A 4/30/2021    Procedure: Percutaneous Coronary Intervention;  Surgeon: Russell Voss MD;  Location: [facility] CATH INVASIVE LOCATION;  Service: Cardiovascular;  Laterality: N/A;   • CARDIAC CATHETERIZATION N/A 4/30/2021    Procedure: Stent NIKKI coronary;  Surgeon: Russell Voss MD;  Location: [facility] CATH INVASIVE LOCATION;  Service: Cardiovascular;  Laterality: N/A;   • CARDIAC CATHETERIZATION Left 11/13/2021    Procedure: Left Heart Cath;  Surgeon: Niall Rios MD;  Location: [facility] CATH INVASIVE LOCATION;  Service: Cardiology;  Laterality: Left;   • CAROTID STENT Left    • COLONOSCOPY     • COLONOSCOPY N/A 5/14/2021    Procedure: COLONOSCOPY;  Surgeon: Mingo Duarte MD;   Location: [facility] ENDOSCOPY;  Service: Gastroenterology;  Laterality: N/A;   • CORONARY ARTERY BYPASS GRAFT N/A 2013    CABG X 3   • CYSTOSCOPY BLADDER STONE LITHOTRIPSY Bilateral    • ENDOSCOPY N/A 4/12/2021    Procedure: ESOPHAGOGASTRODUODENOSCOPY;  Surgeon: Mingo Duarte MD;  Location: [facility] ENDOSCOPY;  Service: Gastroenterology;  Laterality: N/A;   • ENDOSCOPY N/A 5/14/2021    Procedure: ESOPHAGOGASTRODUODENOSCOPY;  Surgeon: Mingo Duarte MD;  Location: [facility] ENDOSCOPY;  Service: Gastroenterology;  Laterality: N/A;   • ENDOSCOPY N/A 1/28/2022    Procedure: ESOPHAGOGASTRODUODENOSCOPY;  Surgeon: Mingo Duarte MD;  Location: [facility] ENDOSCOPY;  Service: Gastroenterology;  Laterality: N/A;   • INTERVENTIONAL RADIOLOGY PROCEDURE N/A 10/21/2021    Procedure: tunneled central venous catheter placement;  Surgeon: Donnie Robles MD;  Location: [facility] ANGIO INVASIVE LOCATION;  Service: Interventional Radiology;  Laterality: N/A;   • INTERVENTIONAL RADIOLOGY PROCEDURE N/A 1/27/2022    Procedure: tunneled central venous catheter placement;  Surgeon: Donnie Robles MD;  Location: [facility] ANGIO INVASIVE LOCATION;  Service: Interventional Radiology;  Laterality: N/A;     Family History   Problem Relation Age of Onset   • Heart disease Mother    • Lung cancer Mother    • Heart disease Father    • Heart attack Father    • Diabetes Father    • Heart disease Half-Sister         Dad's side   • Heart disease Brother    • No Known Problems Sister    • No Known Problems Sister    • No Known Problems Sister    • No Known Problems Sister    • No Known Problems Sister    • Pancreatic cancer Half-Sister         Dad's side   • No Known Problems Brother    • No Known Problems Brother    • Heart attack Half-Brother    • Heart attack Half-Brother    • No Known Problems Maternal Grandmother    • No Known Problems Maternal Grandfather    • No Known Problems Paternal Grandmother    • No Known Problems Paternal  Grandfather      Social History     Tobacco Use   • Smoking status: Former Smoker     Packs/day: 0.25     Years: 46.00     Pack years: 11.50     Types: Cigarettes     Start date: 1999     Quit date: 2/15/2022     Years since quittin.3   • Smokeless tobacco: Never Used   • Tobacco comment: only smoking 5 a day - quit 2021   Substance Use Topics   • Alcohol use: No   • Drug use: Not Currently     Types: LSD, Marijuana, Methamphetamines     Medications Prior to Admission   Medication Sig Dispense Refill Last Dose   • acetaminophen (Tylenol 8 Hour) 650 MG 8 hr tablet Take 1 tablet by mouth Every 8 (Eight) Hours As Needed for Mild Pain . 90 tablet 0    • albuterol (PROVENTIL) (2.5 MG/3ML) 0.083% nebulizer solution Inhale the contents of 1 vial by nebulization Every 4 (Four) Hours As Needed for Wheezing. 75 mL 3    • albuterol sulfate  (90 Base) MCG/ACT inhaler Inhale 2 puffs Every 4 (Four) Hours As Needed for Wheezing. 18 g 1    • aspirin (aspirin) 81 MG EC tablet Take 1 tablet by mouth Daily. 60 tablet 5    • atorvastatin (LIPITOR) 20 MG tablet Take 1 tablet by mouth every night at bedtime. 90 tablet 0    • Blood Glucose Monitoring Suppl ([facility] Blood Glucose Meter) w/Device kit 1 each 3 (Three) Times a Day. 1 kit 3    • bumetanide (BUMEX) 2 MG tablet Take 1 tablet by mouth 4 (Four) Times a Week. Take on non-hemodialysis days. 16 tablet 0    • Capsaicin 0.035 % cream Apply 3 g topically 3 (Three) Times a Day As Needed (ankle pain). 42.5 g 2    • Cetirizine HCl 10 MG capsule Take 1 capsule by mouth Daily.      • clopidogrel (PLAVIX) 75 MG tablet Take 1 tablet by mouth Daily. 30 tablet 5    • Continuous Blood Gluc  (FreeStyle Jade 2 [place]) device 1 each Continuous. 1 each 0    • Continuous Blood Gluc Sensor (FreeStyle Jade 2 Sensor) misc 1 each Every 14 (Fourteen) Days. 2 each 11    • cyclobenzaprine (FLEXERIL) 5 MG tablet Take 2 tablets by mouth At Night As Needed for Muscle Spasms. 90  "tablet 0    • Diclofenac Sodium (VOLTAREN) 1 % gel gel Apply 4 g topically to the appropriate area as directed 4 (Four) Times a Day As Needed (Ankle pain). 350 g 2    • DULoxetine (CYMBALTA) 20 MG capsule Take 2 capsules by mouth Daily for 90 days. 180 capsule 0    • Easy Touch Insulin Syringe 30G X 5/16\" 0.5 ML misc USE AS DIRECTED WITH LEVEMIR      • EASY TOUCH PEN NEEDLES 31G X 8 MM misc       • gabapentin (NEURONTIN) 300 MG capsule Take 1 capsule by mouth Daily. And an extra pill M/W/F - dialysis days 45 capsule 2    • glucose blood test strip Use as instructed 100 each 12    • insulin detemir (LEVEMIR) 100 UNIT/ML injection Inject 25 Units under the skin into the appropriate area as directed Every Night. 3 mL 12    • Insulin Lispro, 1 Unit Dial, (HUMALOG) 100 UNIT/ML solution pen-injector Inject 12 Units under the skin into the appropriate area as directed 3 (Three) Times a Day With Meals. 30 mL 12    • Insulin Pen Needle (NovoFine) 30G X 8 MM misc As directed 4 times daily 100 each 11    • Insulin Pen Needle 31G X 8 MM misc Use to inject insulin 4 (Four) Times a Day as directed. 120 each 12    • ipratropium-albuterol (DUO-NEB) 0.5-2.5 mg/3 ml nebulizer Take 3 mL by nebulization 4 (Four) Times a Day.      • isosorbide mononitrate (IMDUR) 30 MG 24 hr tablet Take 1 tablet by mouth Every Morning. 90 tablet 1    • levothyroxine (SYNTHROID, LEVOTHROID) 25 MCG tablet Take 25 mcg by mouth Daily.      • lisinopril (PRINIVIL,ZESTRIL) 20 MG tablet Take 1 tablet by mouth Daily. 90 tablet 0    • Methoxy PEG-Epoetin Beta (MIRCERA IJ) 150 mcg Every 14 (Fourteen) Days.      • Methoxy PEG-Epoetin Beta (MIRCERA IJ) 225 mcg Every 14 (Fourteen) Days.      • metoprolol tartrate (LOPRESSOR) 25 MG tablet Take 1 tablet by mouth Every 12 (Twelve) Hours. 180 tablet 0    • montelukast (SINGULAIR) 10 MG tablet Take 1 tablet by mouth Every Night. 90 tablet 0    • muscle rub (BenGay) 10-15 % cream cream Apply 1 application topically to the " appropriate area as directed 4 (Four) Times a Day As Needed for buttock pain. 85 g 1    • nitroglycerin (NITROSTAT) 0.4 MG SL tablet Place 0.4 mg under the tongue Every 5 (Five) Minutes As Needed for Chest Pain (x 3 doses).      • O2 (OXYGEN) Inhale 3 L/min Continuous.      • ondansetron ODT (Zofran ODT) 4 MG disintegrating tablet Place 1 tablet on the tongue Every 8 (Eight) Hours As Needed for Nausea or Vomiting. 30 tablet 0    • oxybutynin XL (DITROPAN-XL) 5 MG 24 hr tablet Take 1 tablet by mouth Daily. 90 tablet 0    • pantoprazole (PROTONIX) 40 MG EC tablet Take 1 tablet by mouth Every Night. 90 tablet 0    • promethazine (PHENERGAN) 25 MG suppository Insert  into the rectum Every 6 (Six) Hours.      • ranolazine (RANEXA) 500 MG 12 hr tablet Take 1 tablet by mouth Every 12 (Twelve) Hours. 180 tablet 0    • Semaglutide (Rybelsus) 7 MG tablet Take 7 mg by mouth Daily. 90 tablet 0    • sevelamer (RENVELA) 800 MG tablet Take 800 mg by mouth 3 (Three) Times a Day With Meals.      • sodium bicarbonate 650 MG tablet Take 1 tablet by mouth.        Allergies:  Adhesive tape, Latex, Nsaids, and Other     CURRENT MEDICATIONS/OBJECTIVE/VS/PE:     Current Medications:     Current Facility-Administered Medications   Medication Dose Route Frequency Provider Last Rate Last Admin   • acetaminophen (TYLENOL) tablet 500 mg  500 mg Oral Q6H PRN Perez Hooker MD   500 mg at 07/03/22 1613   • albumin human 25 % IV SOLN 12.5 g  12.5 g Intravenous PRN Ace Lauren MD       • sennosides-docusate (PERICOLACE) 8.6-50 MG per tablet 2 tablet  2 tablet Oral BID Charlene Castro MD   2 tablet at 07/03/22 2010    And   • polyethylene glycol (MIRALAX) packet 17 g  17 g Oral Daily PRN Charlene Castro MD   17 g at 07/01/22 1249    And   • bisacodyl (DULCOLAX) EC tablet 5 mg  5 mg Oral Daily PRN Charlene Castro MD   5 mg at 06/29/22 1715    And   • bisacodyl (DULCOLAX) suppository 10 mg  10 mg Rectal Daily PRN Gloria,  MD Charlene       • [START ON 7/5/2022] bumetanide (BUMEX) tablet 2 mg  2 mg Oral Once per day on Sun Tue Thu Ace Ng MD       • dextrose (D50W) (25 g/50 mL) IV injection 10-50 mL  10-50 mL Intravenous Q15 Min PRN Charlene Castro MD       • dextrose (D50W) (25 g/50 mL) IV injection 25 g  25 g Intravenous Q15 Min PRN Jung Leonard MD       • dextrose (GLUTOSE) oral gel 15 g  15 g Oral Q15 Min PRN Jung Leonard MD       • epoetin hubert (EPOGEN,PROCRIT) injection 6,000 Units  6,000 Units Intravenous Once per day on Mon Wed Fri Ace Lauren MD   6,000 Units at 07/04/22 1231   • [START ON 7/6/2022] fluconazole (DIFLUCAN) tablet 200 mg  200 mg Oral Once per day on Mon Wed Fri Jerman Coffman MD        And   • [START ON 7/5/2022] fluconazole (DIFLUCAN) tablet 100 mg  100 mg Oral Once per day on Sun Tue Thu Jerman House MD       • gabapentin (NEURONTIN) capsule 300 mg  300 mg Nasogastric Daily Perez Hooker MD   300 mg at 07/04/22 1403   • glucagon (human recombinant) (GLUCAGEN DIAGNOSTIC) injection 1 mg  1 mg Intramuscular Q15 Min PRN Jung Leonard MD       • guaiFENesin (MUCINEX) 12 hr tablet 1,200 mg  1,200 mg Oral Q12H Perez Hooker MD   1,200 mg at 07/04/22 2046   • guaiFENesin (ROBITUSSIN) 100 MG/5ML oral solution 400 mg  400 mg Oral Q6H PRN Jerman Coffman MD       • heparin (porcine) injection 4,000 Units  4,000 Units Intracatheter PRN Ace Lauren MD   4,000 Units at 07/04/22 1332   • hydrALAZINE (APRESOLINE) injection 10 mg  10 mg Intravenous Q6H PRN Jung Leonard MD   10 mg at 07/01/22 0034   • Insulin Aspart (novoLOG) injection 0-9 Units  0-9 Units Subcutaneous TID AC Perez Hooker MD   7 Units at 07/04/22 1809   • Insulin Aspart (novoLOG) injection 8 Units  8 Units Subcutaneous 4x Daily Perez Hooker MD   8 Units at 07/04/22 2049   • insulin detemir (LEVEMIR) injection 25 Units  25 Units Subcutaneous Q12H Perez Hooker MD   25 Units at  07/04/22 2047   • isosorbide mononitrate (IMDUR) 24 hr tablet 30 mg  30 mg Oral Q24H Jung Leonard MD   30 mg at 07/04/22 0859   • levothyroxine sodium injection 18.75 mcg  18.75 mcg Intravenous Daily Jerman Coffman MD   18.75 mcg at 07/04/22 1404   • Linezolid (ZYVOX) 600 mg 300 mL  600 mg Intravenous Q12H Jung Leonard  mL/hr at 07/04/22 1406 600 mg at 07/04/22 1406   • lisinopril (PRINIVIL,ZESTRIL) tablet 20 mg  20 mg Oral Once per day on Sun Tue Thu Sat Jung Leonard MD   20 mg at 07/03/22 0803   • magnesium sulfate 4 gram infusion - Mg less than or equal to 1mg/dL  4 g Intravenous PRN Charlene Castro MD        Or   • magnesium sulfate 3 gram infusion (1gm x 3) - Mg 1.1 - 1.5 mg/dL  1 g Intravenous PRN Charlene Castro MD        Or   • Magnesium Sulfate 2 gram infusion- Mg 1.6 - 1.9 mg/dL  2 g Intravenous PRN Charlene Castro MD 25 mL/hr at 07/01/22 1617 2 g at 07/01/22 1617   • metoprolol tartrate (LOPRESSOR) injection 5 mg  5 mg Intravenous Q5 Min PRN Froylan Lu MD   5 mg at 06/26/22 1921   • metoprolol tartrate (LOPRESSOR) tablet 25 mg  25 mg Oral Q12H Froylan Lu MD   25 mg at 07/04/22 2046   • naloxone (NARCAN) injection 0.4 mg  0.4 mg Intravenous Q5 Min PRN Charlene Castro MD       • nystatin (MYCOSTATIN) powder   Topical Q12H Perez Hooker MD   Given at 07/04/22 2047   • pantoprazole (PROTONIX) injection 40 mg  40 mg Intravenous Q24H Charlene Castro MD   40 mg at 07/04/22 0901   • potassium chloride (MICRO-K) CR capsule 40 mEq  40 mEq Oral PRN Huy Santa MD        Or   • potassium chloride (KLOR-CON) packet 40 mEq  40 mEq Oral PRN Huy Snata MD   40 mEq at 07/02/22 1210    Or   • potassium chloride 10 mEq in 100 mL IVPB  10 mEq Intravenous Q1H PRN Huy Santa MD       • potassium chloride 10 mEq in 100 mL IVPB  10 mEq Intravenous Continuous PRN Charlene Castro MD   Stopped at 06/26/22 0130   • potassium chloride 10 mEq in 100 mL IVPB  10 mEq  Intravenous Continuous PRN Charlene Castro MD   Stopped at 06/26/22 0212   • scopolamine patch 1 mg/72 hr  1 patch Transdermal Q72H Jerman Coffman MD   1 patch at 07/02/22 1111   • sodium chloride 0.9 % flush 10 mL  10 mL Intravenous Q12H Jerman Coffman MD   10 mL at 07/04/22 2048   • sodium chloride 0.9 % flush 10 mL  10 mL Intravenous Q12H Jerman Coffman MD   10 mL at 07/04/22 2047   • sodium chloride 0.9 % flush 10 mL  10 mL Intravenous Q12H Jerman Coffman MD   10 mL at 07/04/22 2046   • sodium chloride 0.9 % flush 10 mL  10 mL Intravenous PRN Jerman Coffman MD       • sodium chloride 0.9 % flush 20 mL  20 mL Intravenous PRN Jerman Coffman MD           Objective     Review of Systems:   Review of Systems   Constitutional: Negative for chills, fatigue, fever and unexpected weight change.   HENT: Negative for congestion, ear discharge, hearing loss, nosebleeds and sore throat.    Eyes: Negative for pain, discharge and redness.   Respiratory: Negative for cough, chest tightness, shortness of breath and wheezing.    Cardiovascular: Negative for chest pain and palpitations.   Gastrointestinal: Negative for abdominal distention, abdominal pain, blood in stool, constipation, diarrhea, nausea and vomiting.   Endocrine: Negative for cold intolerance, polydipsia, polyphagia and polyuria.   Genitourinary: Negative for dysuria, flank pain, frequency, hematuria and urgency.   Musculoskeletal: Negative for arthralgias, back pain, joint swelling and myalgias.   Skin: Negative for color change, pallor and rash.   Neurological: Negative for tremors, seizures, syncope, weakness and headaches.   Hematological: Negative for adenopathy. Does not bruise/bleed easily.   Psychiatric/Behavioral: Negative for behavioral problems, confusion, dysphoric mood, hallucinations and suicidal ideas. The patient is not nervous/anxious.        Physical Exam:   Temp:  [97.6 °F (36.4 °C)-98.3 °F (36.8 °C)] 97.6 °F (36.4 °C)  Heart Rate:  [71-82]  74  Resp:  [18-20] 20  BP: (132-173)/(61-72) 144/64     Physical Exam:  General Appearance:    Alert, cooperative, in no acute distress   Head:    Normocephalic, without obvious abnormality, atraumatic   Eyes:            Lids and lashes normal, conjunctivae and sclerae normal, no   icterus, no pallor, corneas clear, PERRLA   Ears:    Ears appear intact with no abnormalities noted   Throat:   No oral lesions, no thrush, oral mucosa moist   Neck:   No adenopathy, supple, trachea midline, no thyromegaly, no     carotid bruit, no JVD   Back:     No kyphosis present, no scoliosis present, no skin lesions,       erythema or scars, no tenderness to percussion or                   palpation,   range of motion normal   Lungs:     Clear to auscultation,respirations regular, even and                   unlabored    Heart:    Regular rhythm and normal rate, normal S1 and S2, no            murmur, no gallop, no rub, no click   Breast Exam:    Deferred   Abdomen:     Normal bowel sounds, no masses, no organomegaly, soft        nontender, nondistended, no guarding, no rebound                 tenderness   Genitalia:    Deferred   Extremities:   Moves all extremities well, no edema, no cyanosis, no              redness   Pulses:   Pulses palpable and equal bilaterally   Skin:   No bleeding, bruising or rash   Lymph nodes:   No palpable adenopathy   Neurologic:   Cranial nerves 2 - 12 grossly intact, sensation intact, DTR        present and equal bilaterally      Results Review:     Lab Results (last 24 hours)     Procedure Component Value Units Date/Time    POC Glucose Once [[id number]]  (Abnormal) Collected: 07/04/22 2018    Specimen: Blood Updated: 07/04/22 2105     Glucose 295 mg/dL      Comment: RN NotifiedOperator: [id number] [facility] SYBILMeter ID: [id number]       POC Glucose Once [[id number]]  (Abnormal) Collected: 07/04/22 1616    Specimen: Blood Updated: 07/04/22 1707     Glucose 320 mg/dL      Comment: RN NotifiedOperator:  [id number] MOSQUERA MELISSAMeter ID: [id number]       POC Glucose Once [[id number]]  (Abnormal) Collected: 07/04/22 1033    Specimen: Blood Updated: 07/04/22 1103     Glucose 332 mg/dL      Comment: RN NotifiedOperator: [id number] MOSQUERA MELISSAMeter ID: [id number]       Urine Culture - Urine, Urine, Clean Catch [[id number]]  (Abnormal) Collected: 07/02/22 1418    Specimen: Urine, Clean Catch Updated: 07/04/22 0930     Urine Culture >100,000 CFU/mL Yeast isolated    Narrative:      Colonization of the urinary tract without infection is common. Treatment is discouraged unless the patient is symptomatic, pregnant, or undergoing an invasive urologic procedure.    Blood Culture - Blood, Arm, Right [[id number]]  (Normal) Collected: 06/30/22 0746    Specimen: Blood from Arm, Right Updated: 07/04/22 0803     Blood Culture No growth at 4 days    Blood Culture - Blood, Blood, Central Line [[id number]]  (Normal) Collected: 06/30/22 0746    Specimen: Blood, Central Line Updated: 07/04/22 0803     Blood Culture No growth at 4 days    Comprehensive Metabolic Panel [[id number]]  (Abnormal) Collected: 07/04/22 0646    Specimen: Blood Updated: 07/04/22 0741     Glucose 383 mg/dL      BUN 60 mg/dL      Creatinine 5.65 mg/dL      Sodium 128 mmol/L      Potassium 4.2 mmol/L      Chloride 89 mmol/L      CO2 23.0 mmol/L      Calcium 9.2 mg/dL      Total Protein 7.5 g/dL      Albumin 3.20 g/dL      ALT (SGPT) <5 U/L      AST (SGOT) 10 U/L      Alkaline Phosphatase 198 U/L      Total Bilirubin 0.2 mg/dL      Globulin 4.3 gm/dL      A/G Ratio 0.7 g/dL      BUN/Creatinine Ratio 10.6     Anion Gap 16.0 mmol/L      eGFR 7.9 mL/min/1.73      Comment: <15 Indicative of kidney failure       Narrative:      GFR Normal >60  Chronic Kidney Disease <60  Kidney Failure <15      C-reactive Protein [[id number]]  (Abnormal) Collected: 07/04/22 0646    Specimen: Blood Updated: 07/04/22 0741     C-Reactive Protein 8.25 mg/dL     CBC Auto Differential [[id number]]   (Abnormal) Collected: 07/04/22 0646    Specimen: Blood Updated: 07/04/22 0718     WBC 8.81 10*3/mm3      RBC 3.61 10*6/mm3      Hemoglobin 9.7 g/dL      Hematocrit 30.6 %      MCV 84.8 fL      MCH 26.9 pg      MCHC 31.7 g/dL      RDW 16.2 %      RDW-SD 49.9 fl      MPV 8.7 fL      Platelets 302 10*3/mm3      Neutrophil % 63.8 %      Lymphocyte % 20.5 %      Monocyte % 7.3 %      Eosinophil % 5.8 %      Basophil % 1.1 %      Immature Grans % 1.5 %      Neutrophils, Absolute 5.62 10*3/mm3      Lymphocytes, Absolute 1.81 10*3/mm3      Monocytes, Absolute 0.64 10*3/mm3      Eosinophils, Absolute 0.51 10*3/mm3      Basophils, Absolute 0.10 10*3/mm3      Immature Grans, Absolute 0.13 10*3/mm3      nRBC 0.0 /100 WBC     POC Glucose Once [[id number]]  (Abnormal) Collected: 07/04/22 0605    Specimen: Blood Updated: 07/04/22 0617     Glucose 350 mg/dL      Comment: RN NotifiedOperator: [id number] GALAN JENNIFERMeter ID: [id number]              I reviewed the patient's new clinical results.  I reviewed the patient's new imaging results and agree with the interpretation.     ASSESSMENT/PLAN:   ASSESSMENT: 1.  Anemia with heme positive stool, likely due to GI blood loss.  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  Hemoglobin is stable.  2.  Pneumonia, on antibiotics  3.  History of Flexeril overdosage  4.  End-stage renal disease on hemodialysis  5.  Oropharyngeal dysphagia, speech pathology evaluation noted.  PLAN:  1.  Continue PPI and current supportive care  2.  Follow H&H closely and transfuse as needed  3.  Endoscopic evaluation if hemoglobin drops.  4.  PEG tube placement if dysphagia continues  The risks, benefits, and alternatives of this procedure have been discussed with the patient or the responsible party- the patient understands and agrees to proceed.         Mingo Duarte MD  07/04/22  22:38 CDT          Electronically signed by Mingo Duarte MD at 07/04/22 2240       Medical Student Notes  (last 24 hours)  Notes from 07/04/22 1240 through 07/05/22 1240   No notes of this type exist for this encounter.         Consult Notes (last 24 hours)  Notes from 07/04/22 1240 through 07/05/22 1240   No notes of this type exist for this encounter.

## 2022-07-05 NOTE — THERAPY DISCHARGE NOTE
Acute Care - Speech Language Pathology   Swallow Treatment Note/Discharge [facility]     Patient Name: Yamileth Slater  : 1960  MRN: [id number]  Today's Date: 2022               Admit Date: 2022     Pt seen for skilled ST services this date to address oropharyngeal dysphagia.  Pt much improved this date and sitting at EOB and able to safely self feed.  SLP assessed pt's safety and swallow function w/regular and mech soft solids and thin liquids via straw.  Pt consumed regular (chicken strips) and mech soft (french fries) solids w/efficient mastication, good oral clearance, and timely AP transit.  Pt consumed thin liquids via straw w/no overt s/s of aspiration.  SLP recommends advancing pt's diet to regular solids/thin liquids at this time.  Recommend d/c on current diet at this time; pt in agreement w/recommendations.    Visit Dx:    ICD-10-CM ICD-9-CM   1. Altered mental status, unspecified altered mental status type  R41.82 780.97   2. Acute respiratory failure, unspecified whether with hypoxia or hypercapnia ([facility])  J96.00 518.81   3. Acute pulmonary edema ([facility])  J81.0 518.4   4. ESRD (end stage renal disease) ([facility])  N18.6 585.6   5. Hyperglycemia  R73.9 790.29   6. Pharyngeal dysphagia  R13.13 787.23   7. Impaired functional mobility, balance, gait, and endurance  Z74.09 V49.89   8. Impaired mobility and activities of daily living  Z74.09 V49.89    Z78.9      Patient Active Problem List   Diagnosis   • Chronic midline low back pain without sciatica   • Vitamin D deficiency   • Tobacco dependence syndrome   • Shoulder joint pain   • Peripheral vascular disease ([facility])   • Morbid obesity with BMI of 50.0-59.9, adult ([facility])   • Mixed hyperlipidemia   • Kidney stone   • History of colon polyps   • GERD (gastroesophageal reflux disease)   • Excoriated eczema   • Hypertension   • COPD (chronic obstructive pulmonary disease) ([facility])   • Chronic folliculitis   • Coronary artery disease involving  native coronary artery of native heart without angina pectoris   • 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   • Hypothyroidism   • Carotid artery disease ([facility])   • Marihu[place] abuse   • PAD (peripheral artery disease) ([facility])   • Venous insufficiency   • LAFB (left anterior fascicular block)   • Pulmonary hypertension ([facility])   • Left hip pain   • Neck pain   • MIKE and COPD overlap syndrome ([facility])   • Pneumonia due to COVID-19 virus   • Hyperkalemia   • Acute cystitis with hematuria   • Cutaneous candidiasis   • Community acquired pneumonia of right middle lobe of lung   • MRSA infection   • Esophageal dysphagia   • Monilial esophagitis ([facility])   • NSTEMI, initial episode of care ([facility])   • Cellulitis of left leg   • Urinary tract infection due to Proteus   • History of insertion of tunneled central venous catheter (CVC) with port   • Anemia due to chronic kidney disease, on chronic dialysis ([facility])   • Pneumonitis   • Personal history of noncompliance with medical treatment, presenting hazards to health   • Type 2 diabetes mellitus with autonomic neuropathy ([facility])   • Physical deconditioning   • ESRD on hemodialysis ([facility])   • DVT prophylaxis   • Accidental drug overdose   • Acute respiratory failure with hypoxia and hypercapnia ([facility])   • Anemia   • Ventilator associated pneumonia ([facility])   • Positive fecal occult blood test   • Yeast UTI     Past Medical History:   Diagnosis Date   • Acute blood loss anemia 4/16/2017    Likely due to gastric oozing at this time. - Dr. Duarte (GI) was consulted and has now signed off, will follow up outpatient - pill colonoscopy showed AVMs - continue to monitor   • Acute cystitis with hematuria 3/31/2021    1/13: IV Rocephin 1 gm q 24 1/14 : transitioned  to omnicef 300mg. Urine cultures resulted and did not show growth. Omnicef discontinued as patient is asymptomatic   • Altered mental status 1/9/2022    - AMS on presentation - initial ABG pH 7.3, CO2 34 - Procal 0.29 - UA  negative for acute cysitits -CTA head wnl  - Empiric Zosyn and Vancomycin -Lactate 2.5 on admission  - blood cultures no growth at 24 hours     • Anxiety    • CAD (coronary artery disease) 4/24/2021    S/P 3 stents 5/1/2021 for [facility] Continue ASA 81mg & Clopidogrel 75mg Continue Atorvastatin 40mg   • Carotid artery stenosis    • Chronic obstructive lung disease ([facility])    • CKD (chronic kidney disease) stage 4, GFR 15-29 ml/min ([facility])    • CKD (chronic kidney disease), symptom management only, stage 5 ([facility]) 10/5/2020    Results from last 7 days Lab Units 12/15/21 0548 12/14/21 1323 12/14/21 0916 CREATININE mg/dL 3.92* 3.21* 3.32*  Baseline creatinine 2-3 GFR 13-25 GFR 15 Dialysis MWF, sees Dr. Lauren Nephrology consult,, appreciate recommendations Continue Bumex 1mg bid daily Holding Bumex 2mg 4 times a week   • Colonic polyp    • Coronary arteriosclerosis    • Diabetes mellitus ([facility])    • Diabetic neuropathy ([facility])    • Ear pain, right 10/18/2021    - canal trauma due to patient scratching and DMT2 - added cortisporin ear drops   • Elevated troponin 10/12/2021    -most likely from CKD -Trending down -Neg chest pain   • Generalized abdominal pain 7/1/2022    Could be due to initiation of tube feeds vs dyspepsia vs abdominal cramps related to no PO intake due to intubation vs constipation Continue current laxative regiment  If no bowel movement by this afternoon will consider enema   • GERD (gastroesophageal reflux disease)    • GI bleed 5/13/2021    - GI will follow up outpatient - Protonix 40mg daily - Avoid medical DVT prophy and use mechanical at this time instead. - Continue to monitor - pill colonoscopy results showed AVMs   • History of transfusion    • Hypercholesterolemia    • Hyperosmolar hyperglycemic state (HHS) ([facility]) 6/25/2022    Serum glucose 605 on admission  Anion gap 16 PH 7.37 Bicarb 27.9 Continue fluids  Insulin drip with HHS protocol  Anion gap closed around 10 AM, received one dose of Levamir subq,  will stop insulin drip after 2 hrs     • Hypertension    • Hypokalemia 5/27/2022    Will replace as needed. Will be cautious in the setting of ESRD to avoid need for emergency dialysis   Monitor Qtc intervals on EKG     • Hypomagnesemia 6/27/2021    Monitor and replace   • Morbid obesity ([facility])    • Nephrolithiasis    • On mechanically assisted ventilation ([facility]) 6/26/2022    Vent management and sedation orders placed.  - [facility] intensivist group consulted for vent management appreciate recommendations  - plan to extubate today     • Peripheral vascular disease ([facility])    • Pleural effusion on right 6/26/2022    CXR on 6/30/22 read as a small upper left pulmonary edema vs early pneumonia.  Last Echo 1/2022 EF 61-65 % Continue to monitor  Procal slightly improved, CRP improved On Linezolid and meropenum      • SIRS (systemic inflammatory response syndrome) ([facility]) 1/9/2022    Admission  - WBC 17.78   -   - RR 16 - 1/10: VSS/wnl - CXR - Mild pulm edema - Blood cultures no growth at 24 hours  - Procalcitonin 0.29 - UA : glucose 1000, negative Leucocytes/nitrate - Empiric Zosyn and Vancomcyin    • Sleep apnea    • Substance abuse ([facility])    • Vitamin D deficiency      Past Surgical History:   Procedure Laterality Date   • CARDIAC CATHETERIZATION N/A 7/14/2020   • CARDIAC CATHETERIZATION N/A 4/23/2021    Procedure: Left Heart Cath;  Surgeon: Melba Romo MD;  Location: [facility] INVASIVE LOCATION;  Service: Cardiology;  Laterality: N/A;   • CARDIAC CATHETERIZATION N/A 4/30/2021    Procedure: Percutaneous Coronary Intervention;  Surgeon: Russell Voss MD;  Location: [facility] CATH INVASIVE LOCATION;  Service: Cardiovascular;  Laterality: N/A;   • CARDIAC CATHETERIZATION N/A 4/30/2021    Procedure: Stent NIKKI coronary;  Surgeon: Russell Voss MD;  Location: [facility] CATH INVASIVE LOCATION;  Service: Cardiovascular;  Laterality: N/A;   • CARDIAC CATHETERIZATION Left 11/13/2021    Procedure: Left  Heart Cath;  Surgeon: Niall Rios MD;  Location: [facility] CATH INVASIVE LOCATION;  Service: Cardiology;  Laterality: Left;   • CAROTID STENT Left    • COLONOSCOPY     • COLONOSCOPY N/A 5/14/2021    Procedure: COLONOSCOPY;  Surgeon: Mingo Duarte MD;  Location: [facility] ENDOSCOPY;  Service: Gastroenterology;  Laterality: N/A;   • CORONARY ARTERY BYPASS GRAFT N/A 2013    CABG X 3   • CYSTOSCOPY BLADDER STONE LITHOTRIPSY Bilateral    • ENDOSCOPY N/A 4/12/2021    Procedure: ESOPHAGOGASTRODUODENOSCOPY;  Surgeon: Mingo Duarte MD;  Location: [facility] ENDOSCOPY;  Service: Gastroenterology;  Laterality: N/A;   • ENDOSCOPY N/A 5/14/2021    Procedure: ESOPHAGOGASTRODUODENOSCOPY;  Surgeon: Mingo Duarte MD;  Location: [facility] ENDOSCOPY;  Service: Gastroenterology;  Laterality: N/A;   • ENDOSCOPY N/A 1/28/2022    Procedure: ESOPHAGOGASTRODUODENOSCOPY;  Surgeon: Mingo Duarte MD;  Location: [facility] ENDOSCOPY;  Service: Gastroenterology;  Laterality: N/A;   • INTERVENTIONAL RADIOLOGY PROCEDURE N/A 10/21/2021    Procedure: tunneled central venous catheter placement;  Surgeon: Donnie Robles MD;  Location: [facility] ANGIO INVASIVE LOCATION;  Service: Interventional Radiology;  Laterality: N/A;   • INTERVENTIONAL RADIOLOGY PROCEDURE N/A 1/27/2022    Procedure: tunneled central venous catheter placement;  Surgeon: Donnie Robles MD;  Location: [facility] ANGIO INVASIVE LOCATION;  Service: Interventional Radiology;  Laterality: N/A;       SLP Recommendation and Plan  SLP Swallowing Diagnosis: mod-severe, suspected pharyngeal dysphagia (07/05/22 1150)  SLP Diet Recommendation: regular textures, thin liquids (07/05/22 1150)     Monitor for Signs of Aspiration: yes, notify SLP if any concerns (07/05/22 1150)     Swallow Criteria for Skilled Therapeutic Interventions Met: demonstrates skilled criteria (07/05/22 1150)  Anticipated Discharge Disposition (SLP): unknown (07/05/22 1237)  Rehab Potential/Prognosis,  Swallowing: good, to achieve stated therapy goals (07/05/22 1150)  Therapy Frequency (Swallow): 3 days per week, 5 days per week (07/05/22 1150)  Predicted Duration Therapy Intervention (Days): until discharge (07/05/22 1150)     Daily Summary of Progress (SLP): prepare for discharge (07/05/22 1150)     Anticipated Discharge Disposition (SLP): unknown (07/05/22 1237)        Reason for Discharge: all goals and outcomes met, no further needs identified (07/05/22 1237)     Treatment Assessment (SLP): Pt seen for skilled ST services this date to address oropharyngeal dysphagia.  Pt much improved this date and sitting at EOB and able to safely self feed.  SLP assessed pt's safety and swallow function w/regular and mech soft solids and thin liquids via straw.  Pt consumed regular (chicken strips) and mech soft (french fries) solids w/efficient mastication, good oral clearance, and timely AP transit.  Pt consumed thin liquids via straw w/no overt s/s of aspiration.  SLP recommends advancing pt's diet to regular solids/thin liquids at this time.  Recommend d/c on current diet at this time; pt in agreement w/recommendations. (07/05/22 1150)  Plan for Continued Treatment (SLP): treatment no longer indicated as all goals met (07/05/22 1150)    Plan of Care Reviewed With: patient (07/05/22 1239)  Progress: improving (07/05/22 1239)  Outcome Evaluation: Pt seen for skilled ST services this date to address oropharyngeal dysphagia.  Pt much improved this date and sitting at EOB and able to safely self feed.  SLP assessed pt's safety and swallow function w/regular and mech soft solids and thin liquids via straw.  Pt consumed regular (chicken strips) and mech soft (french fries) solids w/efficient mastication, good oral clearance, and timely AP transit.  Pt consumed thin liquids via straw w/no overt s/s of aspiration.  SLP recommends advancing pt's diet to regular solids/thin liquids at this time.  Recommend d/c on current diet at  this time; pt in agreement w/recommendations. (07/05/22 1239)    SWALLOW EVALUATION (last 72 hours)     SLP Adult Swallow Evaluation     Row Name 07/05/22 1150 07/03/22 0830                Rehab Evaluation    Document Type therapy note (daily note)  -CK therapy note (daily note)  -CK       Total Evaluation Minutes, SLP 40  -CK 43  -CK       Subjective Information no complaints  -CK no complaints  -CK       Patient Observations alert;cooperative;agree to therapy  -CK alert;cooperative;agree to therapy  -CK       Patient/Family/Caregiver Comments/Observations No family present at bedside  -CK No family present at bedside  -CK       Patient Effort good  -CK good  -CK                General Information    Patient Profile Reviewed yes  -CK yes  -CK       Current Method of Nutrition pureed;thin liquids  -CK NPO;nasogastric feedings  -CK       Prior Level of Function-Swallowing unknown  -CK unknown  -CK       Plans/Goals Discussed with patient;agreed upon  -CK patient;agreed upon  -CK                Pain Scale: Numbers Pre/Post-Treatment    Pretreatment Pain Rating 0/10 - no pain  -CK 0/10 - no pain  -CK       Posttreatment Pain Rating 0/10 - no pain  -CK 0/10 - no pain  -CK                Oral Motor Structure and Function    Dentition Assessment natural, present and adequate  -CK natural, present and adequate  -CK       Secretion Management -- requires suctioning to control secretions  -CK       Mucosal Quality moist, healthy  -CK dry  -CK       Gag Response WFL  -CK WFL  -CK       Volitional Swallow WFL  -CK --       Volitional Cough reduced respiratory support  -CK non-productive;weak;reduced respiratory support  -CK                General Eating/Swallowing Observations    Respiratory Support Currently in Use nasal cannula  -CK nasal cannula  -CK       Eating/Swallowing Skills self-fed;appropriate self-feeding skills observed  -CK fed by SLP  -CK       Positioning During Eating upright 90 degree;upright in bed  sitting  EOB  -CK upright 90 degree;upright in bed  -CK       Utensils Used straw  -CK spoon  -CK       Consistencies Trialed regular textures;soft textures;thin liquids  -CK ice chips;nectar/syrup-thick liquids;pureed  -CK       Pre SpO2 (%) -- 97  -CK       Post SpO2 (%) -- 96  -CK                Clinical Swallow Eval    Oral Prep Phase WFL  -CK --       Oral Transit WFL  -CK --       Oral Residue WFL  -CK --       Pharyngeal Phase no overt signs/symptoms of pharyngeal impairment  -CK suspected pharyngeal impairment  -CK                Pharyngeal Phase Concerns    Pharyngeal Phase Concerns -- cough;throat clear  -CK       Cough -- thin  ice chips  -CK       Throat Clear -- nectar  -CK                Swallowing Quality of Life Assessment    Education and counseling provided Safest diet options  -CK Signs of aspiration;Risks of aspiration;Safest diet options;Aspiration precautions;Compensatory strategy recommendations and rationale;Feeding assistance and techniques  -CK                SLP Evaluation Clinical Impression    SLP Swallowing Diagnosis mod-severe;suspected pharyngeal dysphagia  -CK mod-severe;suspected pharyngeal dysphagia  -CK       Functional Impact risk of aspiration/pneumonia  -CK risk of aspiration/pneumonia  -CK       Rehab Potential/Prognosis, Swallowing good, to achieve stated therapy goals  -CK good, to achieve stated therapy goals  -CK       Swallow Criteria for Skilled Therapeutic Interventions Met demonstrates skilled criteria  -CK demonstrates skilled criteria  -CK                SLP Treatment Clinical Impressions    Treatment Assessment (SLP) Pt seen for skilled ST services this date to address oropharyngeal dysphagia.  Pt much improved this date and sitting at EOB and able to safely self feed.  SLP assessed pt's safety and swallow function w/regular and mech soft solids and thin liquids via straw.  Pt consumed regular (chicken strips) and mech soft (french fries) solids w/efficient mastication, good  oral clearance, and timely AP transit.  Pt consumed thin liquids via straw w/no overt s/s of aspiration.  SLP recommends advancing pt's diet to regular solids/thin liquids at this time.  Recommend d/c on current diet at this time; pt in agreement w/recommendations.  -CK Pt seen for skilled ST services this date to address oropharyngeal dysphagia.  Pt more alert and communicative this date w/a stronger, but still hoarse vocal quality.  Pt eager to consume PO; required full feeding assist.  SLP assessed pt's safety and swallow function of ice chips, NTL, and puree at the oral and pharyngeal phases of the swallow.  Pt continues to present w/cough after all ice chip trials.  Pt consumed NTL via spoon w/throat clear on first trial, but no other overt s/s of aspiration on the remaining 115 cc of NTL.  Pt consumed puree solids (full cup of apple sauce) w/no overt s/s of aspiration and efficient bolus management and oral clearance.  SLP recommends advancing diet to puree/NTL via spoon only.  SLP discussed results and recommendations w/pt, MD, and nsg staff and all in agreement.  -CK       Daily Summary of Progress (SLP) prepare for discharge  -CK progress toward functional goals as expected  -CK       Barriers to Overall Progress (SLP) -- Other (see comments)  weakness  -CK       Plan for Continued Treatment (SLP) treatment no longer indicated as all goals met  -CK continue treatment per plan of care  -CK       Care Plan Review evaluation/treatment results reviewed;care plan/treatment goals reviewed;risks/benefits reviewed;current/potential barriers reviewed;patient/other agree to care plan  -CK evaluation/treatment results reviewed;care plan/treatment goals reviewed;risks/benefits reviewed;current/potential barriers reviewed;patient/other agree to care plan  -CK                Recommendations    Therapy Frequency (Swallow) 3 days per week;5 days per week  -CK 3 days per week;5 days per week  -CK       Predicted Duration  Therapy Intervention (Days) until discharge  -CK until discharge  -CK       SLP Diet Recommendation regular textures;thin liquids  -CK nutritional supplements needed;puree;nectar thick liquids  -CK       Recommended Precautions and Strategies upright posture during/after eating;small bites of food and sips of liquid;no straw;liquid via spoon only;general aspiration precautions;fatigue precautions;assist with feeding;1:1 supervision  -CK upright posture during/after eating;small bites of food and sips of liquid;no straw;liquid via spoon only;general aspiration precautions;fatigue precautions;assist with feeding;1:1 supervision  -CK       Oral Care Recommendations -- Oral Care BID/PRN  -CK       SLP Rec. for Method of Medication Administration as tolerated  -CK meds via alternate route;meds crushed;with pudding or applesauce  -CK       Monitor for Signs of Aspiration yes;notify SLP if any concerns  -CK yes;notify SLP if any concerns  -CK       Anticipated Discharge Disposition (SLP) unknown  -CK unknown  -CK                Swallow Goals (SLP)    Swallow LTGs Patient will demonstrate progress toward functional swallow for  -CK Patient will demonstrate progress toward functional swallow for  -CK       Swallow STGs diet tolerance goal selection (SLP)  -CK diet tolerance goal selection (SLP)  -CK       Diet Tolerance Goal Selection (SLP) Patient will tolerate trials of  -CK Patient will tolerate trials of  -CK                (LTG) Patient will demonstrate progress toward functional swallow for    Diet Texture (Demonstrate progress toward functional swallow) regular textures  -CK regular textures  -CK       Liquid viscosity (Demonstrate progress toward functional swallow) thin liquids  -CK thin liquids  -CK       Time Frame (Demonstrate progress toward functional swallow) by discharge  -CK by discharge  -CK       Barriers (Demonstrate progress toward functional swallow) weakness  -CK weakness  -CK       Progress/Outcomes  (Demonstrate progress toward functional swallow) good progress toward goal;goal partially met  -CK goal ongoing  -CK                (STG) Patient will tolerate trials of    Consistencies Trialed (Tolerate trials) thin liquids;pureed textures;nectar/ mildly thick liquids  -CK thin liquids;pureed textures;nectar/ mildly thick liquids  -CK       Desired Outcome (Tolerate trials) without signs/symptoms of aspiration;with adequate oral prep/transit/clearance  -CK without signs/symptoms of aspiration  -CK       Time Frame (Tolerate trials) 1 week  -CK 1 week  -CK       Progress/Outcomes (Tolerate trials) goal met  -CK goal ongoing;good progress toward goal  -CK             User Key  (r) = Recorded By, (t) = Taken By, (c) = Cosigned By    Initials Name Effective Dates    CK May Barillas MS CCC-SLP 06/16/21 -                 EDUCATION  The patient has been educated in the following areas:   D/C instructions.         SLP GOALS     Row Name 07/05/22 1150 07/03/22 0830          (LTG) Patient will demonstrate progress toward functional swallow for    Diet Texture (Demonstrate progress toward functional swallow) regular textures  -CK regular textures  -CK     Liquid viscosity (Demonstrate progress toward functional swallow) thin liquids  -CK thin liquids  -CK     Time Frame (Demonstrate progress toward functional swallow) by discharge  -CK by discharge  -CK     Barriers (Demonstrate progress toward functional swallow) weakness  -CK weakness  -CK     Progress/Outcomes (Demonstrate progress toward functional swallow) good progress toward goal;goal partially met  -CK goal ongoing  -CK            (STG) Patient will tolerate trials of    Consistencies Trialed (Tolerate trials) thin liquids;pureed textures;nectar/ mildly thick liquids  -CK thin liquids;pureed textures;nectar/ mildly thick liquids  -CK     Desired Outcome (Tolerate trials) without signs/symptoms of aspiration;with adequate oral prep/transit/clearance  -CK  without signs/symptoms of aspiration  -CK     Time Frame (Tolerate trials) 1 week  -CK 1 week  -CK     Progress/Outcomes (Tolerate trials) goal met  -CK goal ongoing;good progress toward goal  -CK           User Key  (r) = Recorded By, (t) = Taken By, (c) = Cosigned By    Initials Name Provider Type    May Lutz MS CCC-SLP Speech and Language Pathologist                     Time Calculation:    Time Calculation- SLP     Row Name 07/05/22 1237             Time Calculation- SLP    SLP Start Time 1150  -CK      SLP Stop Time 1230  -CK      SLP Time Calculation (min) 40 min  -CK      Total Timed Code Minutes- SLP 40 minute(s)  -CK      SLP Received On 07/05/22  -CK              Untimed Charges    89205-CL Treatment Swallow Minutes 40  -CK              Total Minutes    Untimed Charges Total Minutes 40  -CK       Total Minutes 40  -CK            User Key  (r) = Recorded By, (t) = Taken By, (c) = Cosigned By    Initials Name Provider Type    May Lutz MS CCC-SLP Speech and Language Pathologist                Therapy Charges for Today     Code Description Service Date Service Provider Modifiers Qty    [id number]  ST TREATMENT SWALLOW 3 7/5/2022 May Barillas MS CCC-SLP GN 1               SLP Discharge Summary  Anticipated Discharge Disposition (SLP): unknown  Reason for Discharge: all goals and outcomes met, no further needs identified  Progress Toward Achieving Short/long Term Goals: all goals met within established timelines    May [place] MS CCC-SLP  7/5/2022

## 2022-07-05 NOTE — THERAPY TREATMENT NOTE
Patient Name: Yamileth Slater  : 1960    MRN: [id number]                              Today's Date: 2022       Admit Date: 2022    Visit Dx:     ICD-10-CM ICD-9-CM   1. Altered mental status, unspecified altered mental status type  R41.82 780.97   2. Acute respiratory failure, unspecified whether with hypoxia or hypercapnia ([facility])  J96.00 518.81   3. Acute pulmonary edema ([facility])  J81.0 518.4   4. ESRD (end stage renal disease) ([facility])  N18.6 585.6   5. Hyperglycemia  R73.9 790.29   6. Pharyngeal dysphagia  R13.13 787.23   7. Impaired functional mobility, balance, gait, and endurance  Z74.09 V49.89   8. Impaired mobility and activities of daily living  Z74.09 V49.89    Z78.9      Patient Active Problem List   Diagnosis   • Chronic midline low back pain without sciatica   • Vitamin D deficiency   • Tobacco dependence syndrome   • Shoulder joint pain   • Peripheral vascular disease ([facility])   • Morbid obesity with BMI of 50.0-59.9, adult ([facility])   • Mixed hyperlipidemia   • Kidney stone   • History of colon polyps   • GERD (gastroesophageal reflux disease)   • Excoriated eczema   • Hypertension   • COPD (chronic obstructive pulmonary disease) ([facility])   • Chronic folliculitis   • Coronary artery disease involving native coronary artery of native heart without angina pectoris   • 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   • Hypothyroidism   • Carotid artery disease ([facility])   • [place] abuse   • PAD (peripheral artery disease) ([facility])   • Venous insufficiency   • LAFB (left anterior fascicular block)   • Pulmonary hypertension ([facility])   • Left hip pain   • Neck pain   • MIKE and COPD overlap syndrome ([facility])   • Pneumonia due to COVID-19 virus   • Hyperkalemia   • Acute cystitis with hematuria   • Cutaneous candidiasis   • Community acquired pneumonia of right middle lobe of lung   • MRSA infection   • Esophageal dysphagia   • Monilial esophagitis ([facility])   • NSTEMI, initial episode of care ([facility])   •  Cellulitis of left leg   • Urinary tract infection due to Proteus   • History of insertion of tunneled central venous catheter (CVC) with port   • Anemia due to chronic kidney disease, on chronic dialysis ([facility])   • Pneumonitis   • Personal history of noncompliance with medical treatment, presenting hazards to health   • Type 2 diabetes mellitus with autonomic neuropathy ([facility])   • Physical deconditioning   • ESRD on hemodialysis ([facility])   • DVT prophylaxis   • Accidental drug overdose   • Acute respiratory failure with hypoxia and hypercapnia ([facility])   • Anemia   • Ventilator associated pneumonia ([facility])   • Positive fecal occult blood test   • Yeast UTI     Past Medical History:   Diagnosis Date   • Acute blood loss anemia 4/16/2017    Likely due to gastric oozing at this time. - Dr. Duarte (GI) was consulted and has now signed off, will follow up outpatient - pill colonoscopy showed AVMs - continue to monitor   • Acute cystitis with hematuria 3/31/2021    1/13: IV Rocephin 1 gm q 24 1/14 : transitioned  to omnicef 300mg. Urine cultures resulted and did not show growth. Omnicef discontinued as patient is asymptomatic   • Altered mental status 1/9/2022    - AMS on presentation - initial ABG pH 7.3, CO2 34 - Procal 0.29 - UA negative for acute cysitits -CTA head wnl  - Empiric Zosyn and Vancomycin -Lactate 2.5 on admission  - blood cultures no growth at 24 hours     • Anxiety    • CAD (coronary artery disease) 4/24/2021    S/P 3 stents 5/1/2021 for [facility] Continue ASA 81mg & Clopidogrel 75mg Continue Atorvastatin 40mg   • Carotid artery stenosis    • Chronic obstructive lung disease (HCC)    • CKD (chronic kidney disease) stage 4, GFR 15-29 ml/min ([facility])    • CKD (chronic kidney disease), symptom management only, stage 5 ([facility]) 10/5/2020    Results from last 7 days Lab Units 12/15/21 0548 12/14/21 1323 12/14/21 0916 CREATININE mg/dL 3.92* 3.21* 3.32*  Baseline creatinine 2-3 GFR 13-25 GFR 15 Dialysis MWF, sees Dr. Lauren  Nephrology consult,, appreciate recommendations Continue Bumex 1mg bid daily Holding Bumex 2mg 4 times a week   • Colonic polyp    • Coronary arteriosclerosis    • Diabetes mellitus (HCC)    • Diabetic neuropathy (HCC)    • Ear pain, right 10/18/2021    - canal trauma due to patient scratching and DMT2 - added cortisporin ear drops   • Elevated troponin 10/12/2021    -most likely from CKD -Trending down -Neg chest pain   • Generalized abdominal pain 7/1/2022    Could be due to initiation of tube feeds vs dyspepsia vs abdominal cramps related to no PO intake due to intubation vs constipation Continue current laxative regiment  If no bowel movement by this afternoon will consider enema   • GERD (gastroesophageal reflux disease)    • GI bleed 5/13/2021    - GI will follow up outpatient - Protonix 40mg daily - Avoid medical DVT prophy and use mechanical at this time instead. - Continue to monitor - pill colonoscopy results showed AVMs   • History of transfusion    • Hypercholesterolemia    • Hyperosmolar hyperglycemic state (HHS) ([facility]) 6/25/2022    Serum glucose 605 on admission  Anion gap 16 PH 7.37 Bicarb 27.9 Continue fluids  Insulin drip with HHS protocol  Anion gap closed around 10 AM, received one dose of Levamir subq, will stop insulin drip after 2 hrs     • Hypertension    • Hypokalemia 5/27/2022    Will replace as needed. Will be cautious in the setting of ESRD to avoid need for emergency dialysis   Monitor Qtc intervals on EKG     • Hypomagnesemia 6/27/2021    Monitor and replace   • Morbid obesity (HCC)    • Nephrolithiasis    • On mechanically assisted ventilation ([facility]) 6/26/2022    Vent management and sedation orders placed.  - [facility] intensivist group consulted for vent management appreciate recommendations  - plan to extubate today     • Peripheral vascular disease (HCC)    • Pleural effusion on right 6/26/2022    CXR on 6/30/22 read as a small upper left pulmonary edema vs early pneumonia.  Last  Echo 1/2022 EF 61-65 % Continue to monitor  Procal slightly improved, CRP improved On Linezolid and meropenum      • SIRS (systemic inflammatory response syndrome) ([facility]) 1/9/2022    Admission  - WBC 17.78   -   - RR 16 - 1/10: VSS/wnl - CXR - Mild pulm edema - Blood cultures no growth at 24 hours  - Procalcitonin 0.29 - UA : glucose 1000, negative Leucocytes/nitrate - Empiric Zosyn and Vancomcyin    • Sleep apnea    • Substance abuse ([facility])    • Vitamin D deficiency      Past Surgical History:   Procedure Laterality Date   • CARDIAC CATHETERIZATION N/A 7/14/2020   • CARDIAC CATHETERIZATION N/A 4/23/2021    Procedure: Left Heart Cath;  Surgeon: Melba Romo MD;  Location: [facility] CATH INVASIVE LOCATION;  Service: Cardiology;  Laterality: N/A;   • CARDIAC CATHETERIZATION N/A 4/30/2021    Procedure: Percutaneous Coronary Intervention;  Surgeon: Russell Voss MD;  Location: [facility] CATH INVASIVE LOCATION;  Service: Cardiovascular;  Laterality: N/A;   • CARDIAC CATHETERIZATION N/A 4/30/2021    Procedure: Stent NIKKI coronary;  Surgeon: Russell Voss MD;  Location: [facility] CATH INVASIVE LOCATION;  Service: Cardiovascular;  Laterality: N/A;   • CARDIAC CATHETERIZATION Left 11/13/2021    Procedure: Left Heart Cath;  Surgeon: Niall Rios MD;  Location: [facility] CATH INVASIVE LOCATION;  Service: Cardiology;  Laterality: Left;   • CAROTID STENT Left    • COLONOSCOPY     • COLONOSCOPY N/A 5/14/2021    Procedure: COLONOSCOPY;  Surgeon: Mingo Duarte MD;  Location: [facility] ENDOSCOPY;  Service: Gastroenterology;  Laterality: N/A;   • CORONARY ARTERY BYPASS GRAFT N/A 2013    CABG X 3   • CYSTOSCOPY BLADDER STONE LITHOTRIPSY Bilateral    • ENDOSCOPY N/A 4/12/2021    Procedure: ESOPHAGOGASTRODUODENOSCOPY;  Surgeon: Mingo Duarte MD;  Location: [facility] ENDOSCOPY;  Service: Gastroenterology;  Laterality: N/A;   • ENDOSCOPY N/A 5/14/2021    Procedure: ESOPHAGOGASTRODUODENOSCOPY;  Surgeon: Felicia  MD Mingo;  Location: [facility] ENDOSCOPY;  Service: Gastroenterology;  Laterality: N/A;   • ENDOSCOPY N/A 1/28/2022    Procedure: ESOPHAGOGASTRODUODENOSCOPY;  Surgeon: Mingo Duarte MD;  Location: [facility] ENDOSCOPY;  Service: Gastroenterology;  Laterality: N/A;   • INTERVENTIONAL RADIOLOGY PROCEDURE N/A 10/21/2021    Procedure: tunneled central venous catheter placement;  Surgeon: Donnie Robles MD;  Location: [facility] ANGIO INVASIVE LOCATION;  Service: Interventional Radiology;  Laterality: N/A;   • INTERVENTIONAL RADIOLOGY PROCEDURE N/A 1/27/2022    Procedure: tunneled central venous catheter placement;  Surgeon: Donnie Robles MD;  Location: [facility] ANGIO INVASIVE LOCATION;  Service: Interventional Radiology;  Laterality: N/A;      General Information     Row Name 07/05/22 0745          OT Time and Intention    Document Type therapy note (daily note)  -     Mode of Treatment individual therapy;occupational therapy  -     Row Name 07/05/22 0745          General Information    Patient Profile Reviewed yes  -     Existing Precautions/Restrictions fall  -KD     Row Name 07/05/22 0745          Cognition    Orientation Status (Cognition) oriented to;person;disoriented to;place;situation;time  -     Row Name 07/05/22 0745          Safety Issues, Functional Mobility    Impairments Affecting Function (Mobility) endurance/activity tolerance  -           User Key  (r) = Recorded By, (t) = Taken By, (c) = Cosigned By    Initials Name Provider Type     Rosanna Mullen COTA Occupational Therapist Assistant                 Mobility/ADL's     Row Name 07/05/22 0745          Bed Mobility    Supine-Sit-Supine [place] (Bed Mobility) standby assist  -     Assistive Device (Bed Mobility) head of bed elevated;bed rails  -     Row Name 07/05/22 0745          Transfers    Transfers sit-stand transfer;stand-sit transfer  -     Bed-Chair [place] (Transfers) supervision  -     Sit-Stand  [place] (Transfers) supervision  -KD     Stand-Sit [place] (Transfers) supervision  -KD     Row Name 07/05/22 0745          Sit-Stand Transfer    Assistive Device (Sit-Stand Transfers) walker, front-wheeled  -KD     Row Name 07/05/22 0745          Stand-Sit Transfer    Assistive Device (Stand-Sit Transfers) walker, front-wheeled  -KD     Row Name 07/05/22 0745          Functional Mobility    Functional Mobility- Ind. Level supervision required  -KD     Functional Mobility-Distance (Feet) 10  -KD     Row Name 07/05/22 0745          Activities of Daily Living    BADL Assessment/Intervention bathing;upper body dressing;lower body dressing;toileting;grooming  -KD     Row Name 07/05/22 0745          Bathing Assessment/Intervention    [place] Level (Bathing) bathing skills;upper body;lower body;supervision  w/  LH sponge  -KD     Assistive Devices (Bathing) bath mitt  -     Row Name 07/05/22 0745          Upper Body Dressing Assessment/Training    [place] Level (Upper Body Dressing) doff;don;supervision  -KD     Position (Upper Body Dressing) edge of bed sitting  -     Row Name 07/05/22 07          Lower Body Dressing Assessment/Training    [place] Level (Lower Body Dressing) lower body dressing skills;doff;don;socks;supervision  -     Assistive Devices (Lower Body Dressing) sock-aid  -KD     Position (Lower Body Dressing) edge of bed sitting;supported sitting  -     Row Name 07/05/22 0745          Toileting Assessment/Training    [place] Level (Toileting) toileting skills;standby assist  -     Assistive Devices (Toileting) commode, bedside with drop arms  -     Position (Toileting) unsupported sitting  -     Row Name 07/05/22 0745          Grooming Assessment/Training    [place] Level (Grooming) grooming skills;hair care, combing/brushing;wash face, hands;set up  -KD     Position (Grooming) edge of bed sitting  -           User Key  (r) = Recorded By, (t) = Taken By,  (c) = Cosigned By    Initials Name Provider Type    Rosanna Varner COTA Occupational Therapist Assistant               Obj/Interventions    No documentation.                Goals/Plan     Row Name 07/05/22 0745          Transfer Goal 1 (OT)    Activity/Assistive Device (Transfer Goal 1, OT) transfers, all  -KD     [place] Level/Cues Needed (Transfer Goal 1, OT) contact guard required  -KD     Time Frame (Transfer Goal 1, OT) long term goal (LTG)  -KD     Progress/Outcome (Transfer Goal 1, OT) goal not met  -KD     Row Name 07/05/22 07          Bathing Goal 1 (OT)    Activity/Device (Bathing Goal 1, OT) upper body bathing  -KD     [place] Level/Cues Needed (Bathing Goal 1, OT) supervision required  -KD     Time Frame (Bathing Goal 1, OT) long term goal (LTG)  -KD     Progress/Outcomes (Bathing Goal 1, OT) goal not met  -KD     Row Name 07/05/22 07          Dressing Goal 1 (OT)    Activity/Device (Dressing Goal 1, OT) dressing skills, all  -KD     [place]/Cues Needed (Dressing Goal 1, OT) contact guard required  -KD     Time Frame (Dressing Goal 1, OT) long term goal (LTG)  -KD     Progress/Outcome (Dressing Goal 1, OT) goal not met  -KD     Row Name 07/05/22 0745          Toileting Goal 1 (OT)    Activity/Device (Toileting Goal 1, OT) toileting skills, all  -KD     [place] Level/Cues Needed (Toileting Goal 1, OT) supervision required  -KD     Time Frame (Toileting Goal 1, OT) long term goal (LTG)  -KD     Progress/Outcome (Toileting Goal 1, OT) goal not met  -KD           User Key  (r) = Recorded By, (t) = Taken By, (c) = Cosigned By    Initials Name Provider Type    Rosanna Varner COTA Occupational Therapist Assistant               Clinical Impression     Row Name 07/05/22 0745          Pain Assessment    Pretreatment Pain Rating 0/10 - no pain  -KD     Posttreatment Pain Rating 0/10 - no pain  -KD     Row Name 07/05/22 0745          Plan of Care Review    Plan of Care Reviewed With  patient  -KD     Progress improving  -KD     Outcome Evaluation Pt tolerated tx well this date. Sup-sit-SBA. Pt sat EOB and completed ADL. Grooming-set up.  UB bathing/dressing-SBA. LB bathing-SBA w/ LH sponge. LB dressing-SBA w/ sock aid. Sit-stand-CGA, BSC t/f-CGA. Pt sitting EOB w/ all needs in reach upon exit. Nsg in room. Cont OT POC.  -KD     Row Name 07/05/22 0745          Therapy Assessment/Plan (OT)    Therapy Frequency (OT) other (see comments)  3-7 days a week  -KD     Row Name 07/05/22 0745          Therapy Plan Review/Discharge Plan (OT)    Anticipated Discharge Disposition (OT) home with 24/7 care;home with home health;home with assist  -KD     Row Name 07/05/22 0745          Vital Signs    Pre Systolic BP Rehab 138  -KD     Pre Treatment Diastolic BP 60  -KD     Pretreatment Heart Rate (beats/min) 67  -KD     Pre SpO2 (%) 99  -KD     O2 Delivery Pre Treatment room air  -KD     Pre Patient Position Supine  -KD     Intra Patient Position Standing  -KD     Post Patient Position Sitting  -KD     Row Name 07/05/22 0745          Positioning and Restraints    Pre-Treatment Position in bed  -KD     Post Treatment Position bed  -KD     In Bed sitting EOB;call light within reach;encouraged to call for assist;exit alarm on  -KD           User Key  (r) = Recorded By, (t) = Taken By, (c) = Cosigned By    Initials Name Provider Type    KD Rosanna Mullen COTA Occupational Therapist Assistant               Outcome Measures     Row Name 07/05/22 0745          How much help from another is currently needed...    Putting on and taking off regular lower body clothing? 2  -KD     Bathing (including washing, rinsing, and drying) 2  -KD     Toileting (which includes using toilet bed pan or urinal) 2  -KD     Putting on and taking off regular upper body clothing 3  -KD     Taking care of personal grooming (such as brushing teeth) 3  -KD     Eating meals 4  -KD     AM-PAC 6 Clicks Score (OT) 16  -KD           User Key  (r)  = Recorded By, (t) = Taken By, (c) = Cosigned By    Initials Name Provider Type    Rosanna Varner COTA Occupational Therapist Assistant                Occupational Therapy Education                 Title: PT OT SLP Therapies (In Progress)     Topic: Occupational Therapy (In Progress)     Point: ADL training (Not Started)     Description:   Instruct learner(s) on proper safety adaptation and remediation techniques during self care or transfers.   Instruct in proper use of assistive devices.              Learner Progress:  Not documented in this visit.          Point: Home exercise program (Not Started)     Description:   Instruct learner(s) on appropriate technique for monitoring, assisting and/or progressing therapeutic exercises/activities.              Learner Progress:  Not documented in this visit.          Point: Precautions (Done)     Description:   Instruct learner(s) on prescribed precautions during self-care and functional transfers.              Learning Progress Summary           Patient Acceptance, E, VU by VERNON at 7/3/2022 [id number]    Comment: Edu pt on use of gait belt and non skid socks when OOB and no OOB without assist.                   Point: Body mechanics (Not Started)     Description:   Instruct learner(s) on proper positioning and spine alignment during self-care, functional mobility activities and/or exercises.              Learner Progress:  Not documented in this visit.                      User Key     Initials Effective Dates Name Provider Type Discipline    VERNON 06/16/21 -  Toney Mantilla OT Occupational Therapist OT              OT Recommendation and Plan  Therapy Frequency (OT): other (see comments) (3-7 days a week)  Plan of Care Review  Plan of Care Reviewed With: patient  Progress: improving  Outcome Evaluation: Pt tolerated tx well this date. Sup-sit-SBA. Pt sat EOB and completed ADL. Grooming-set up.  UB bathing/dressing-SBA. LB bathing-SBA w/ LH sponge. LB dressing-SBA w/ sock aid.  Sit-stand-CGA, BSC t/f-CGA. Pt sitting EOB w/ all needs in reach upon exit. Nsg in room. Cont OT POC.     Time Calculation:    Time Calculation- OT     Row Name 07/05/22 0745             Time Calculation- OT    OT Start Time 0745  -KD      OT Stop Time 0823  -KD      OT Time Calculation (min) 38 min  -KD      Total Timed Code Minutes- OT 38 minute(s)  -KD      OT Received On 07/05/22  -KD              Timed Charges    39418 - OT Self Care/Mgmt Minutes 38  -KD              Total Minutes    Timed Charges Total Minutes 38  -KD       Total Minutes 38  -KD            User Key  (r) = Recorded By, (t) = Taken By, (c) = Cosigned By    Initials Name Provider Type    Rosanna Varner COTA Occupational Therapist Assistant              Therapy Charges for Today     Code Description Service Date Service Provider Modifiers Qty    [id number] HC OT SELF CARE/MGMT/TRAIN EA 15 MIN 7/5/2022 Rosanna Mullen COTA GO 3               ISHAN Narvaez  7/5/2022

## 2022-07-05 NOTE — PROGRESS NOTES
SUBJECTIVE:   7/4/2022  Chief Complaint:     Subjective      Patient feels better today.  Denied abdominal pain, nausea or vomiting.  Hemoglobin is 9.7.  Ambulating with PT.  Tolerating soft diet.  Dobbhoff tube was discontinued.    History:  Past Medical History:   Diagnosis Date   • Acute blood loss anemia 4/16/2017    Likely due to gastric oozing at this time. - Dr. Duarte (GI) was consulted and has now signed off, will follow up outpatient - pill colonoscopy showed AVMs - continue to monitor   • Acute cystitis with hematuria 3/31/2021    1/13: IV Rocephin 1 gm q 24 1/14 : transitioned  to omnicef 300mg. Urine cultures resulted and did not show growth. Omnicef discontinued as patient is asymptomatic   • Altered mental status 1/9/2022    - AMS on presentation - initial ABG pH 7.3, CO2 34 - Procal 0.29 - UA negative for acute cysitits -CTA head wnl  - Empiric Zosyn and Vancomycin -Lactate 2.5 on admission  - blood cultures no growth at 24 hours     • Anxiety    • CAD (coronary artery disease) 4/24/2021    S/P 3 stents 5/1/2021 for [facility] Continue ASA 81mg & Clopidogrel 75mg Continue Atorvastatin 40mg   • Carotid artery stenosis    • Chronic obstructive lung disease (HCC)    • CKD (chronic kidney disease) stage 4, GFR 15-29 ml/min ([facility])    • CKD (chronic kidney disease), symptom management only, stage 5 (HCC) 10/5/2020    Results from last 7 days Lab Units 12/15/21 0548 12/14/21 1323 12/14/21 0916 CREATININE mg/dL 3.92* 3.21* 3.32*  Baseline creatinine 2-3 GFR 13-25 GFR 15 Dialysis MWF, sees Dr. Lauren Nephrology consult,, appreciate recommendations Continue Bumex 1mg bid daily Holding Bumex 2mg 4 times a week   • Colonic polyp    • Coronary arteriosclerosis    • Diabetes mellitus (HCC)    • Diabetic neuropathy (HCC)    • Ear pain, right 10/18/2021    - canal trauma due to patient scratching and DMT2 - added cortisporin ear drops   • Elevated troponin 10/12/2021    -most likely from CKD -Trending down -Neg  chest pain   • Generalized abdominal pain 7/1/2022    Could be due to initiation of tube feeds vs dyspepsia vs abdominal cramps related to no PO intake due to intubation vs constipation Continue current laxative regiment  If no bowel movement by this afternoon will consider enema   • GERD (gastroesophageal reflux disease)    • GI bleed 5/13/2021    - GI will follow up outpatient - Protonix 40mg daily - Avoid medical DVT prophy and use mechanical at this time instead. - Continue to monitor - pill colonoscopy results showed AVMs   • History of transfusion    • Hypercholesterolemia    • Hyperosmolar hyperglycemic state (HHS) ([facility]) 6/25/2022    Serum glucose 605 on admission  Anion gap 16 PH 7.37 Bicarb 27.9 Continue fluids  Insulin drip with [facility] protocol  Anion gap closed around 10 AM, received one dose of Levamir subq, will stop insulin drip after 2 hrs     • Hypertension    • Hypokalemia 5/27/2022    Will replace as needed. Will be cautious in the setting of ESRD to avoid need for emergency dialysis   Monitor Qtc intervals on EKG     • Hypomagnesemia 6/27/2021    Monitor and replace   • Morbid obesity ([facility])    • Nephrolithiasis    • On mechanically assisted ventilation ([facility]) 6/26/2022    Vent management and sedation orders placed.  - [facility] intensivist group consulted for vent management appreciate recommendations  - plan to extubate today     • Peripheral vascular disease ([facility])    • Pleural effusion on right 6/26/2022    CXR on 6/30/22 read as a small upper left pulmonary edema vs early pneumonia.  Last Echo 1/2022 EF 61-65 % Continue to monitor  Procal slightly improved, CRP improved On Linezolid and meropenum      • SIRS (systemic inflammatory response syndrome) ([facility]) 1/9/2022    Admission  - WBC 17.78   -   - RR 16 - 1/10: VSS/wnl - CXR - Mild pulm edema - Blood cultures no growth at 24 hours  - Procalcitonin 0.29 - UA : glucose 1000, negative Leucocytes/nitrate - Empiric Zosyn and Vancomcyin    •  Sleep apnea    • Substance abuse (HCC)    • Vitamin D deficiency      Past Surgical History:   Procedure Laterality Date   • CARDIAC CATHETERIZATION N/A 7/14/2020   • CARDIAC CATHETERIZATION N/A 4/23/2021    Procedure: Left Heart Cath;  Surgeon: Melba Romo MD;  Location: [facility] CATH INVASIVE LOCATION;  Service: Cardiology;  Laterality: N/A;   • CARDIAC CATHETERIZATION N/A 4/30/2021    Procedure: Percutaneous Coronary Intervention;  Surgeon: Russell Voss MD;  Location: [facility] CATH INVASIVE LOCATION;  Service: Cardiovascular;  Laterality: N/A;   • CARDIAC CATHETERIZATION N/A 4/30/2021    Procedure: Stent NIKKI coronary;  Surgeon: Russell Voss MD;  Location: [facility] CATH INVASIVE LOCATION;  Service: Cardiovascular;  Laterality: N/A;   • CARDIAC CATHETERIZATION Left 11/13/2021    Procedure: Left Heart Cath;  Surgeon: Niall Rios MD;  Location: [facility] CATH INVASIVE LOCATION;  Service: Cardiology;  Laterality: Left;   • CAROTID STENT Left    • COLONOSCOPY     • COLONOSCOPY N/A 5/14/2021    Procedure: COLONOSCOPY;  Surgeon: Mingo Duarte MD;  Location: [facility] ENDOSCOPY;  Service: Gastroenterology;  Laterality: N/A;   • CORONARY ARTERY BYPASS GRAFT N/A 2013    CABG X 3   • CYSTOSCOPY BLADDER STONE LITHOTRIPSY Bilateral    • ENDOSCOPY N/A 4/12/2021    Procedure: ESOPHAGOGASTRODUODENOSCOPY;  Surgeon: Mingo Duarte MD;  Location: [facility] ENDOSCOPY;  Service: Gastroenterology;  Laterality: N/A;   • ENDOSCOPY N/A 5/14/2021    Procedure: ESOPHAGOGASTRODUODENOSCOPY;  Surgeon: Mingo Duarte MD;  Location: [facility] ENDOSCOPY;  Service: Gastroenterology;  Laterality: N/A;   • ENDOSCOPY N/A 1/28/2022    Procedure: ESOPHAGOGASTRODUODENOSCOPY;  Surgeon: Mingo Duarte MD;  Location: [facility] ENDOSCOPY;  Service: Gastroenterology;  Laterality: N/A;   • INTERVENTIONAL RADIOLOGY PROCEDURE N/A 10/21/2021    Procedure: tunneled central venous catheter placement;  Surgeon: Donnie Robles,  MD;  Location: [facility] ANGIO INVASIVE LOCATION;  Service: Interventional Radiology;  Laterality: N/A;   • INTERVENTIONAL RADIOLOGY PROCEDURE N/A 2022    Procedure: tunneled central venous catheter placement;  Surgeon: Donnie Robles MD;  Location: [facility] INVASIVE LOCATION;  Service: Interventional Radiology;  Laterality: N/A;     Family History   Problem Relation Age of Onset   • Heart disease Mother    • Lung cancer Mother    • Heart disease Father    • Heart attack Father    • Diabetes Father    • Heart disease Half-Sister         Dad's side   • Heart disease Brother    • No Known Problems Sister    • No Known Problems Sister    • No Known Problems Sister    • No Known Problems Sister    • No Known Problems Sister    • Pancreatic cancer Half-Sister         Dad's side   • No Known Problems Brother    • No Known Problems Brother    • Heart attack Half-Brother    • Heart attack Half-Brother    • No Known Problems Maternal Grandmother    • No Known Problems Maternal Grandfather    • No Known Problems Paternal Grandmother    • No Known Problems Paternal Grandfather      Social History     Tobacco Use   • Smoking status: Former Smoker     Packs/day: 0.25     Years: 46.00     Pack years: 11.50     Types: Cigarettes     Start date: 1999     Quit date: 2/15/2022     Years since quittin.3   • Smokeless tobacco: Never Used   • Tobacco comment: only smoking 5 a day - quit 2021   Substance Use Topics   • Alcohol use: No   • Drug use: Not Currently     Types: LSD, Marijuana, Methamphetamines     Medications Prior to Admission   Medication Sig Dispense Refill Last Dose   • acetaminophen (Tylenol 8 Hour) 650 MG 8 hr tablet Take 1 tablet by mouth Every 8 (Eight) Hours As Needed for Mild Pain . 90 tablet 0    • albuterol (PROVENTIL) (2.5 MG/3ML) 0.083% nebulizer solution Inhale the contents of 1 vial by nebulization Every 4 (Four) Hours As Needed for Wheezing. 75 mL 3    • albuterol sulfate HFA  "108 (90 Base) MCG/ACT inhaler Inhale 2 puffs Every 4 (Four) Hours As Needed for Wheezing. 18 g 1    • aspirin (aspirin) 81 MG EC tablet Take 1 tablet by mouth Daily. 60 tablet 5    • atorvastatin (LIPITOR) 20 MG tablet Take 1 tablet by mouth every night at bedtime. 90 tablet 0    • Blood Glucose Monitoring Suppl ([employer] Blood Glucose Meter) w/Device kit 1 each 3 (Three) Times a Day. 1 kit 3    • bumetanide (BUMEX) 2 MG tablet Take 1 tablet by mouth 4 (Four) Times a Week. Take on non-hemodialysis days. 16 tablet 0    • Capsaicin 0.035 % cream Apply 3 g topically 3 (Three) Times a Day As Needed (ankle pain). 42.5 g 2    • Cetirizine HCl 10 MG capsule Take 1 capsule by mouth Daily.      • clopidogrel (PLAVIX) 75 MG tablet Take 1 tablet by mouth Daily. 30 tablet 5    • Continuous Blood Gluc  (FreeStyle Jade 2 [place]) device 1 each Continuous. 1 each 0    • Continuous Blood Gluc Sensor (FreeStyle Jade 2 Sensor) misc 1 each Every 14 (Fourteen) Days. 2 each 11    • cyclobenzaprine (FLEXERIL) 5 MG tablet Take 2 tablets by mouth At Night As Needed for Muscle Spasms. 90 tablet 0    • Diclofenac Sodium (VOLTAREN) 1 % gel gel Apply 4 g topically to the appropriate area as directed 4 (Four) Times a Day As Needed (Ankle pain). 350 g 2    • DULoxetine (CYMBALTA) 20 MG capsule Take 2 capsules by mouth Daily for 90 days. 180 capsule 0    • Easy Touch Insulin Syringe 30G X 5/16\" 0.5 ML misc USE AS DIRECTED WITH LEVEMIR      • EASY TOUCH PEN NEEDLES 31G X 8 MM misc       • gabapentin (NEURONTIN) 300 MG capsule Take 1 capsule by mouth Daily. And an extra pill M/W/F - dialysis days 45 capsule 2    • glucose blood test strip Use as instructed 100 each 12    • insulin detemir (LEVEMIR) 100 UNIT/ML injection Inject 25 Units under the skin into the appropriate area as directed Every Night. 3 mL 12    • Insulin Lispro, 1 Unit Dial, (HUMALOG) 100 UNIT/ML solution pen-injector Inject 12 Units under the skin into the appropriate area as " directed 3 (Three) Times a Day With Meals. 30 mL 12    • Insulin Pen Needle (NovoFine) 30G X 8 MM misc As directed 4 times daily 100 each 11    • Insulin Pen Needle 31G X 8 MM misc Use to inject insulin 4 (Four) Times a Day as directed. 120 each 12    • ipratropium-albuterol (DUO-NEB) 0.5-2.5 mg/3 ml nebulizer Take 3 mL by nebulization 4 (Four) Times a Day.      • isosorbide mononitrate (IMDUR) 30 MG 24 hr tablet Take 1 tablet by mouth Every Morning. 90 tablet 1    • levothyroxine (SYNTHROID, LEVOTHROID) 25 MCG tablet Take 25 mcg by mouth Daily.      • lisinopril (PRINIVIL,ZESTRIL) 20 MG tablet Take 1 tablet by mouth Daily. 90 tablet 0    • Methoxy PEG-Epoetin Beta (MIRCERA IJ) 150 mcg Every 14 (Fourteen) Days.      • Methoxy PEG-Epoetin Beta (MIRCERA IJ) 225 mcg Every 14 (Fourteen) Days.      • metoprolol tartrate (LOPRESSOR) 25 MG tablet Take 1 tablet by mouth Every 12 (Twelve) Hours. 180 tablet 0    • montelukast (SINGULAIR) 10 MG tablet Take 1 tablet by mouth Every Night. 90 tablet 0    • muscle rub (BenGay) 10-15 % cream cream Apply 1 application topically to the appropriate area as directed 4 (Four) Times a Day As Needed for buttock pain. 85 g 1    • nitroglycerin (NITROSTAT) 0.4 MG SL tablet Place 0.4 mg under the tongue Every 5 (Five) Minutes As Needed for Chest Pain (x 3 doses).      • O2 (OXYGEN) Inhale 3 L/min Continuous.      • ondansetron ODT (Zofran ODT) 4 MG disintegrating tablet Place 1 tablet on the tongue Every 8 (Eight) Hours As Needed for Nausea or Vomiting. 30 tablet 0    • oxybutynin XL (DITROPAN-XL) 5 MG 24 hr tablet Take 1 tablet by mouth Daily. 90 tablet 0    • pantoprazole (PROTONIX) 40 MG EC tablet Take 1 tablet by mouth Every Night. 90 tablet 0    • promethazine (PHENERGAN) 25 MG suppository Insert  into the rectum Every 6 (Six) Hours.      • ranolazine (RANEXA) 500 MG 12 hr tablet Take 1 tablet by mouth Every 12 (Twelve) Hours. 180 tablet 0    • Semaglutide (Rybelsus) 7 MG tablet Take 7  mg by mouth Daily. 90 tablet 0    • sevelamer (RENVELA) 800 MG tablet Take 800 mg by mouth 3 (Three) Times a Day With Meals.      • sodium bicarbonate 650 MG tablet Take 1 tablet by mouth.        Allergies:  Adhesive tape, Latex, Nsaids, and Other     CURRENT MEDICATIONS/OBJECTIVE/VS/PE:     Current Medications:     Current Facility-Administered Medications   Medication Dose Route Frequency Provider Last Rate Last Admin   • acetaminophen (TYLENOL) tablet 500 mg  500 mg Oral Q6H PRN Perez Hooker MD   500 mg at 07/03/22 1613   • albumin human 25 % IV SOLN 12.5 g  12.5 g Intravenous PRN Ace Lauren MD       • sennosides-docusate (PERICOLACE) 8.6-50 MG per tablet 2 tablet  2 tablet Oral BID Charlene Castro MD   2 tablet at 07/03/22 2010    And   • polyethylene glycol (MIRALAX) packet 17 g  17 g Oral Daily PRN Charlene Castro MD   17 g at 07/01/22 1249    And   • bisacodyl (DULCOLAX) EC tablet 5 mg  5 mg Oral Daily PRN Charlene Castro MD   5 mg at 06/29/22 1715    And   • bisacodyl (DULCOLAX) suppository 10 mg  10 mg Rectal Daily PRN Charlene Castro MD       • [START ON 7/5/2022] bumetanide (BUMEX) tablet 2 mg  2 mg Oral Once per day on Sun Tue Thu Sat Ace Lauren MD       • dextrose (D50W) (25 g/50 mL) IV injection 10-50 mL  10-50 mL Intravenous Q15 Min PRN Charlene Castro MD       • dextrose (D50W) (25 g/50 mL) IV injection 25 g  25 g Intravenous Q15 Min PRN Jung Leonard MD       • dextrose (GLUTOSE) oral gel 15 g  15 g Oral Q15 Min PRN Jugn Leonard MD       • epoetin hubert (EPOGEN,PROCRIT) injection 6,000 Units  6,000 Units Intravenous Once per day on Mon Wed Fri Ace Lauren MD   6,000 Units at 07/04/22 1231   • [START ON 7/6/2022] fluconazole (DIFLUCAN) tablet 200 mg  200 mg Oral Once per day on Mon Wed Fri Jerman Coffman MD        And   • [START ON 7/5/2022] fluconazole (DIFLUCAN) tablet 100 mg  100 mg Oral Once per day on Sun Tue Thu Sat Jerman Coffman MD       • gabapentin  (NEURONTIN) capsule 300 mg  300 mg Nasogastric Daily Perez Hooker MD   300 mg at 07/04/22 1403   • glucagon (human recombinant) (GLUCAGEN DIAGNOSTIC) injection 1 mg  1 mg Intramuscular Q15 Min PRN Jung Leonard MD       • guaiFENesin (MUCINEX) 12 hr tablet 1,200 mg  1,200 mg Oral Q12H Perez Hooker MD   1,200 mg at 07/04/22 2046   • guaiFENesin (ROBITUSSIN) 100 MG/5ML oral solution 400 mg  400 mg Oral Q6H PRN Jerman Coffman MD       • heparin (porcine) injection 4,000 Units  4,000 Units Intracatheter PRN Ace Lauren MD   4,000 Units at 07/04/22 1332   • hydrALAZINE (APRESOLINE) injection 10 mg  10 mg Intravenous Q6H PRN Jung Leonard MD   10 mg at 07/01/22 0034   • Insulin Aspart (novoLOG) injection 0-9 Units  0-9 Units Subcutaneous TID AC Perez Hooker MD   7 Units at 07/04/22 1809   • Insulin Aspart (novoLOG) injection 8 Units  8 Units Subcutaneous 4x Daily Perez Hooker MD   8 Units at 07/04/22 2049   • insulin detemir (LEVEMIR) injection 25 Units  25 Units Subcutaneous Q12H Perez Hooker MD   25 Units at 07/04/22 2047   • isosorbide mononitrate (IMDUR) 24 hr tablet 30 mg  30 mg Oral Q24H Jung Leonard MD   30 mg at 07/04/22 0859   • levothyroxine sodium injection 18.75 mcg  18.75 mcg Intravenous Daily Jerman Coffman MD   18.75 mcg at 07/04/22 1404   • Linezolid (ZYVOX) 600 mg 300 mL  600 mg Intravenous Q12H Jung Leonard  mL/hr at 07/04/22 1406 600 mg at 07/04/22 1406   • lisinopril (PRINIVIL,ZESTRIL) tablet 20 mg  20 mg Oral Once per day on Sun Tue Thu Sat Jung Leonard MD   20 mg at 07/03/22 0803   • magnesium sulfate 4 gram infusion - Mg less than or equal to 1mg/dL  4 g Intravenous PRN Charlene Castro MD        Or   • magnesium sulfate 3 gram infusion (1gm x 3) - Mg 1.1 - 1.5 mg/dL  1 g Intravenous PRN Charlene Castro MD        Or   • Magnesium Sulfate 2 gram infusion- Mg 1.6 - 1.9 mg/dL  2 g Intravenous PRN Charlene Castro MD 25 mL/hr at  07/01/22 1617 2 g at 07/01/22 1617   • metoprolol tartrate (LOPRESSOR) injection 5 mg  5 mg Intravenous Q5 Min PRN Froylan Lu MD   5 mg at 06/26/22 1921   • metoprolol tartrate (LOPRESSOR) tablet 25 mg  25 mg Oral Q12H Froylan Lu MD   25 mg at 07/04/22 2046   • naloxone (NARCAN) injection 0.4 mg  0.4 mg Intravenous Q5 Min PRN Charlene Castro MD       • nystatin (MYCOSTATIN) powder   Topical Q12H Perez Hooker MD   Given at 07/04/22 2047   • pantoprazole (PROTONIX) injection 40 mg  40 mg Intravenous Q24H Charlene Castro MD   40 mg at 07/04/22 0901   • potassium chloride (MICRO-K) CR capsule 40 mEq  40 mEq Oral PRN Huy Santa MD        Or   • potassium chloride (KLOR-CON) packet 40 mEq  40 mEq Oral PRN Huy Santa MD   40 mEq at 07/02/22 1210    Or   • potassium chloride 10 mEq in 100 mL IVPB  10 mEq Intravenous Q1H PRN Huy Santa MD       • potassium chloride 10 mEq in 100 mL IVPB  10 mEq Intravenous Continuous PRN Charlene Castro MD   Stopped at 06/26/22 0130   • potassium chloride 10 mEq in 100 mL IVPB  10 mEq Intravenous Continuous PRN Charlene Castro MD   Stopped at 06/26/22 0212   • scopolamine patch 1 mg/72 hr  1 patch Transdermal Q72H Jerman Coffman MD   1 patch at 07/02/22 1111   • sodium chloride 0.9 % flush 10 mL  10 mL Intravenous Q12H Jerman Coffman MD   10 mL at 07/04/22 2048   • sodium chloride 0.9 % flush 10 mL  10 mL Intravenous Q12H Jerman Coffman MD   10 mL at 07/04/22 2047   • sodium chloride 0.9 % flush 10 mL  10 mL Intravenous Q12H Jerman Coffman MD   10 mL at 07/04/22 2046   • sodium chloride 0.9 % flush 10 mL  10 mL Intravenous PRN Jerman Coffman MD       • sodium chloride 0.9 % flush 20 mL  20 mL Intravenous PRN Jerman Coffman MD           Objective     Review of Systems:   Review of Systems   Constitutional: Negative for chills, fatigue, fever and unexpected weight change.   HENT: Negative for congestion, ear discharge, hearing loss,  nosebleeds and sore throat.    Eyes: Negative for pain, discharge and redness.   Respiratory: Negative for cough, chest tightness, shortness of breath and wheezing.    Cardiovascular: Negative for chest pain and palpitations.   Gastrointestinal: Negative for abdominal distention, abdominal pain, blood in stool, constipation, diarrhea, nausea and vomiting.   Endocrine: Negative for cold intolerance, polydipsia, polyphagia and polyuria.   Genitourinary: Negative for dysuria, flank pain, frequency, hematuria and urgency.   Musculoskeletal: Negative for arthralgias, back pain, joint swelling and myalgias.   Skin: Negative for color change, pallor and rash.   Neurological: Negative for tremors, seizures, syncope, weakness and headaches.   Hematological: Negative for adenopathy. Does not bruise/bleed easily.   Psychiatric/Behavioral: Negative for behavioral problems, confusion, dysphoric mood, hallucinations and suicidal ideas. The patient is not nervous/anxious.        Physical Exam:   Temp:  [97.6 °F (36.4 °C)-98.3 °F (36.8 °C)] 97.6 °F (36.4 °C)  Heart Rate:  [71-82] 74  Resp:  [18-20] 20  BP: (132-173)/(61-72) 144/64     Physical Exam:  General Appearance:    Alert, cooperative, in no acute distress   Head:    Normocephalic, without obvious abnormality, atraumatic   Eyes:            Lids and lashes normal, conjunctivae and sclerae normal, no   icterus, no pallor, corneas clear, PERRLA   Ears:    Ears appear intact with no abnormalities noted   Throat:   No oral lesions, no thrush, oral mucosa moist   Neck:   No adenopathy, supple, trachea midline, no thyromegaly, no     carotid bruit, no JVD   Back:     No kyphosis present, no scoliosis present, no skin lesions,       erythema or scars, no tenderness to percussion or                   palpation,   range of motion normal   Lungs:     Clear to auscultation,respirations regular, even and                   unlabored    Heart:    Regular rhythm and normal rate, normal S1  and S2, no            murmur, no gallop, no rub, no click   Breast Exam:    Deferred   Abdomen:     Normal bowel sounds, no masses, no organomegaly, soft        nontender, nondistended, no guarding, no rebound                 tenderness   Genitalia:    Deferred   Extremities:   Moves all extremities well, no edema, no cyanosis, no              redness   Pulses:   Pulses palpable and equal bilaterally   Skin:   No bleeding, bruising or rash   Lymph nodes:   No palpable adenopathy   Neurologic:   Cranial nerves 2 - 12 grossly intact, sensation intact, DTR        present and equal bilaterally      Results Review:     Lab Results (last 24 hours)     Procedure Component Value Units Date/Time    POC Glucose Once [[id number]]  (Abnormal) Collected: 07/04/22 2018    Specimen: Blood Updated: 07/04/22 2105     Glucose 295 mg/dL      Comment: RN NotifiedOperator: [id number] DAVID SYBILMeter ID: [id number]       POC Glucose Once [[id number]]  (Abnormal) Collected: 07/04/22 1616    Specimen: Blood Updated: 07/04/22 1707     Glucose 320 mg/dL      Comment: RN NotifiedOperator: [id number] MOSQUERA MELISSAMeter ID: [id number]       POC Glucose Once [[id number]]  (Abnormal) Collected: 07/04/22 1033    Specimen: Blood Updated: 07/04/22 1103     Glucose 332 mg/dL      Comment: RN NotifiedOperator: [id number] MOSQUERA MELISSAMeter ID: [id number]       Urine Culture - Urine, Urine, Clean Catch [[id number]]  (Abnormal) Collected: 07/02/22 1418    Specimen: Urine, Clean Catch Updated: 07/04/22 0930     Urine Culture >100,000 CFU/mL Yeast isolated    Narrative:      Colonization of the urinary tract without infection is common. Treatment is discouraged unless the patient is symptomatic, pregnant, or undergoing an invasive urologic procedure.    Blood Culture - Blood, Arm, Right [[id number]]  (Normal) Collected: 06/30/22 0746    Specimen: Blood from Arm, Right Updated: 07/04/22 0803     Blood Culture No growth at 4 days    Blood Culture -  Blood, Blood, Central Line [[id number]]  (Normal) Collected: 06/30/22 0746    Specimen: Blood, Central Line Updated: 07/04/22 0803     Blood Culture No growth at 4 days    Comprehensive Metabolic Panel [[id number]]  (Abnormal) Collected: 07/04/22 0646    Specimen: Blood Updated: 07/04/22 0741     Glucose 383 mg/dL      BUN 60 mg/dL      Creatinine 5.65 mg/dL      Sodium 128 mmol/L      Potassium 4.2 mmol/L      Chloride 89 mmol/L      CO2 23.0 mmol/L      Calcium 9.2 mg/dL      Total Protein 7.5 g/dL      Albumin 3.20 g/dL      ALT (SGPT) <5 U/L      AST (SGOT) 10 U/L      Alkaline Phosphatase 198 U/L      Total Bilirubin 0.2 mg/dL      Globulin 4.3 gm/dL      A/G Ratio 0.7 g/dL      BUN/Creatinine Ratio 10.6     Anion Gap 16.0 mmol/L      eGFR 7.9 mL/min/1.73      Comment: <15 Indicative of kidney failure       Narrative:      GFR Normal >60  Chronic Kidney Disease <60  Kidney Failure <15      C-reactive Protein [[id number]]  (Abnormal) Collected: 07/04/22 0646    Specimen: Blood Updated: 07/04/22 0741     C-Reactive Protein 8.25 mg/dL     CBC Auto Differential [[id number]]  (Abnormal) Collected: 07/04/22 0646    Specimen: Blood Updated: 07/04/22 0718     WBC 8.81 10*3/mm3      RBC 3.61 10*6/mm3      Hemoglobin 9.7 g/dL      Hematocrit 30.6 %      MCV 84.8 fL      MCH 26.9 pg      MCHC 31.7 g/dL      RDW 16.2 %      RDW-SD 49.9 fl      MPV 8.7 fL      Platelets 302 10*3/mm3      Neutrophil % 63.8 %      Lymphocyte % 20.5 %      Monocyte % 7.3 %      Eosinophil % 5.8 %      Basophil % 1.1 %      Immature Grans % 1.5 %      Neutrophils, Absolute 5.62 10*3/mm3      Lymphocytes, Absolute 1.81 10*3/mm3      Monocytes, Absolute 0.64 10*3/mm3      Eosinophils, Absolute 0.51 10*3/mm3      Basophils, Absolute 0.10 10*3/mm3      Immature Grans, Absolute 0.13 10*3/mm3      nRBC 0.0 /100 WBC     POC Glucose Once [[id number]]  (Abnormal) Collected: 07/04/22 0605    Specimen: Blood Updated: 07/04/22 0617     Glucose 350 mg/dL       Comment: RN NotifiedOperator: [id number] ADAMA GONGORAFERMeter ID: [id number]              I reviewed the patient's new clinical results.  I reviewed the patient's new imaging results and agree with the interpretation.     ASSESSMENT/PLAN:   ASSESSMENT: 1.  Anemia with heme positive stool, likely due to GI blood loss.  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  Hemoglobin is stable.  2.  Pneumonia, on antibiotics  3.  History of Flexeril overdosage  4.  End-stage renal disease on hemodialysis  5.  Oropharyngeal dysphagia, speech pathology evaluation noted.  PLAN:  1.  Continue PPI and current supportive care  2.  Follow H&H closely and transfuse as needed  3.  Endoscopic evaluation if hemoglobin drops.  4.  PEG tube placement if dysphagia continues  The risks, benefits, and alternatives of this procedure have been discussed with the patient or the responsible party- the patient understands and agrees to proceed.         Mingo Duarte MD  07/04/22  22:38 CDT

## 2022-07-05 NOTE — PLAN OF CARE
Goal Outcome Evaluation:  Plan of Care Reviewed With: patient        Progress: improving  Outcome Evaluation: Pt tolerated tx well this date. Sup-sit-SBA. Pt sat EOB and completed ADL. Grooming-set up.  UB bathing/dressing-SBA. LB bathing-SBA w/ LH sponge. LB dressing-SBA w/ sock aid. Sit-stand-CGA, BSC t/f-CGA. Pt sitting EOB w/ all needs in reach upon exit. Nsg in room. Cont OT POC.

## 2022-07-05 NOTE — PLAN OF CARE
Goal Outcome Evaluation:  Plan of Care Reviewed With: patient        Progress: improving  Outcome Evaluation: Pt. more alert this p.m. & agreeable to treatment. Pt. transferred sup-sit SBA, sit-stand-sit CGA v.c.'s for hand placement, pt. transferred bed-chair st. pivot with RW CGA, pt. performed x15-20 reps seated therex, pt. left sitting up in recliner with all needs within reach this p.m. Cont. to mobilize

## 2022-07-05 NOTE — THERAPY TREATMENT NOTE
Acute Care - Physical Therapy Treatment Note  [facility]     Patient Name: Yamileth Slater  : 1960  MRN: [id number]  Today's Date: 2022      Visit Dx:     ICD-10-CM ICD-9-CM   1. Altered mental status, unspecified altered mental status type  R41.82 780.97   2. Acute respiratory failure, unspecified whether with hypoxia or hypercapnia ([facility])  J96.00 518.81   3. Acute pulmonary edema ([facility])  J81.0 518.4   4. ESRD (end stage renal disease) ([facility])  N18.6 585.6   5. Hyperglycemia  R73.9 790.29   6. Pharyngeal dysphagia  R13.13 787.23   7. Impaired functional mobility, balance, gait, and endurance  Z74.09 V49.89   8. Impaired mobility and activities of daily living  Z74.09 V49.89    Z78.9      Patient Active Problem List   Diagnosis   • Chronic midline low back pain without sciatica   • Vitamin D deficiency   • Tobacco dependence syndrome   • Shoulder joint pain   • Peripheral vascular disease ([facility])   • Morbid obesity with BMI of 50.0-59.9, adult ([facility])   • Mixed hyperlipidemia   • Kidney stone   • History of colon polyps   • GERD (gastroesophageal reflux disease)   • Excoriated eczema   • Hypertension   • COPD (chronic obstructive pulmonary disease) ([facility])   • Chronic folliculitis   • Coronary artery disease involving native coronary artery of native heart without angina pectoris   • 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   • Hypothyroidism   • Carotid artery disease ([facility])   • [place] abuse   • PAD (peripheral artery disease) ([facility])   • Venous insufficiency   • LAFB (left anterior fascicular block)   • Pulmonary hypertension ([facility])   • Left hip pain   • Neck pain   • MIKE and COPD overlap syndrome ([facility])   • Pneumonia due to COVID-19 virus   • Hyperkalemia   • Acute cystitis with hematuria   • Cutaneous candidiasis   • Community acquired pneumonia of right middle lobe of lung   • MRSA infection   • Esophageal dysphagia   • Monilial esophagitis ([facility])   • NSTEMI, initial episode of care  ([facility])   • Cellulitis of left leg   • Urinary tract infection due to Proteus   • History of insertion of tunneled central venous catheter (CVC) with port   • Anemia due to chronic kidney disease, on chronic dialysis ([facility])   • Pneumonitis   • Personal history of noncompliance with medical treatment, presenting hazards to health   • Type 2 diabetes mellitus with autonomic neuropathy ([facility])   • Physical deconditioning   • ESRD on hemodialysis ([facility])   • DVT prophylaxis   • Accidental drug overdose   • Acute respiratory failure with hypoxia and hypercapnia ([facility])   • Anemia   • Ventilator associated pneumonia ([facility])   • Positive fecal occult blood test   • Yeast UTI     Past Medical History:   Diagnosis Date   • Acute blood loss anemia 4/16/2017    Likely due to gastric oozing at this time. - Dr. Duarte (GI) was consulted and has now signed off, will follow up outpatient - pill colonoscopy showed AVMs - continue to monitor   • Acute cystitis with hematuria 3/31/2021    1/13: IV Rocephin 1 gm q 24 1/14 : transitioned  to omnicef 300mg. Urine cultures resulted and did not show growth. Omnicef discontinued as patient is asymptomatic   • Altered mental status 1/9/2022    - AMS on presentation - initial ABG pH 7.3, CO2 34 - Procal 0.29 - UA negative for acute cysitits -CTA head wnl  - Empiric Zosyn and Vancomycin -Lactate 2.5 on admission  - blood cultures no growth at 24 hours     • Anxiety    • CAD (coronary artery disease) 4/24/2021    S/P 3 stents 5/1/2021 for [facility] Continue ASA 81mg & Clopidogrel 75mg Continue Atorvastatin 40mg   • Carotid artery stenosis    • Chronic obstructive lung disease ([facility])    • CKD (chronic kidney disease) stage 4, GFR 15-29 ml/min ([facility])    • CKD (chronic kidney disease), symptom management only, stage 5 ([facility]) 10/5/2020    Results from last 7 days Lab Units 12/15/21 0548 12/14/21 1323 12/14/21 0916 CREATININE mg/dL 3.92* 3.21* 3.32*  Baseline creatinine 2-3 GFR 13-25 GFR 15 Dialysis MWF, sees   [facility]phrology consult,, appreciate recommendations Continue Bumex 1mg bid daily Holding Bumex 2mg 4 times a week   • Colonic polyp    • Coronary arteriosclerosis    • Diabetes mellitus (HCC)    • Diabetic neuropathy (HCC)    • Ear pain, right 10/18/2021    - canal trauma due to patient scratching and DMT2 - added cortisporin ear drops   • Elevated troponin 10/12/2021    -most likely from CKD -Trending down -Neg chest pain   • Generalized abdominal pain 7/1/2022    Could be due to initiation of tube feeds vs dyspepsia vs abdominal cramps related to no PO intake due to intubation vs constipation Continue current laxative regiment  If no bowel movement by this afternoon will consider enema   • GERD (gastroesophageal reflux disease)    • GI bleed 5/13/2021    - GI will follow up outpatient - Protonix 40mg daily - Avoid medical DVT prophy and use mechanical at this time instead. - Continue to monitor - pill colonoscopy results showed AVMs   • History of transfusion    • Hypercholesterolemia    • Hyperosmolar hyperglycemic state (HHS) ([facility]) 6/25/2022    Serum glucose 605 on admission  Anion gap 16 PH 7.37 Bicarb 27.9 Continue fluids  Insulin drip with HHS protocol  Anion gap closed around 10 AM, received one dose of Levamir subq, will stop insulin drip after 2 hrs     • Hypertension    • Hypokalemia 5/27/2022    Will replace as needed. Will be cautious in the setting of ESRD to avoid need for emergency dialysis   Monitor Qtc intervals on EKG     • Hypomagnesemia 6/27/2021    Monitor and replace   • Morbid obesity (HCC)    • Nephrolithiasis    • On mechanically assisted ventilation ([facility]) 6/26/2022    Vent management and sedation orders placed.  - [facility] intensivist group consulted for vent management appreciate recommendations  - plan to extubate today     • Peripheral vascular disease (HCC)    • Pleural effusion on right 6/26/2022    CXR on 6/30/22 read as a small upper left pulmonary edema vs early pneumonia.   Last Echo 1/2022 EF 61-65 % Continue to monitor  Procal slightly improved, CRP improved On Linezolid and meropenum      • SIRS (systemic inflammatory response syndrome) ([facility]) 1/9/2022    Admission  - WBC 17.78   -   - RR 16 - 1/10: VSS/wnl - CXR - Mild pulm edema - Blood cultures no growth at 24 hours  - Procalcitonin 0.29 - UA : glucose 1000, negative Leucocytes/nitrate - Empiric Zosyn and Vancomcyin    • Sleep apnea    • Substance abuse ([facility])    • Vitamin D deficiency      Past Surgical History:   Procedure Laterality Date   • CARDIAC CATHETERIZATION N/A 7/14/2020   • CARDIAC CATHETERIZATION N/A 4/23/2021    Procedure: Left Heart Cath;  Surgeon: Melba Romo MD;  Location: [facility] CATH INVASIVE LOCATION;  Service: Cardiology;  Laterality: N/A;   • CARDIAC CATHETERIZATION N/A 4/30/2021    Procedure: Percutaneous Coronary Intervention;  Surgeon: Russell Voss MD;  Location: [facility] CATH INVASIVE LOCATION;  Service: Cardiovascular;  Laterality: N/A;   • CARDIAC CATHETERIZATION N/A 4/30/2021    Procedure: Stent NIKKI coronary;  Surgeon: Russell Voss MD;  Location: [facility] CATH INVASIVE LOCATION;  Service: Cardiovascular;  Laterality: N/A;   • CARDIAC CATHETERIZATION Left 11/13/2021    Procedure: Left Heart Cath;  Surgeon: Niall Rios MD;  Location: [facility] CATH INVASIVE LOCATION;  Service: Cardiology;  Laterality: Left;   • CAROTID STENT Left    • COLONOSCOPY     • COLONOSCOPY N/A 5/14/2021    Procedure: COLONOSCOPY;  Surgeon: Mingo Duarte MD;  Location: [facility] ENDOSCOPY;  Service: Gastroenterology;  Laterality: N/A;   • CORONARY ARTERY BYPASS GRAFT N/A 2013    CABG X 3   • CYSTOSCOPY BLADDER STONE LITHOTRIPSY Bilateral    • ENDOSCOPY N/A 4/12/2021    Procedure: ESOPHAGOGASTRODUODENOSCOPY;  Surgeon: Mingo Duarte MD;  Location: [facility] ENDOSCOPY;  Service: Gastroenterology;  Laterality: N/A;   • ENDOSCOPY N/A 5/14/2021    Procedure: ESOPHAGOGASTRODUODENOSCOPY;  Surgeon:  Mingo Duarte MD;  Location: [facility] ENDOSCOPY;  Service: Gastroenterology;  Laterality: N/A;   • ENDOSCOPY N/A 1/28/2022    Procedure: ESOPHAGOGASTRODUODENOSCOPY;  Surgeon: Mingo Duarte MD;  Location: [facility] ENDOSCOPY;  Service: Gastroenterology;  Laterality: N/A;   • INTERVENTIONAL RADIOLOGY PROCEDURE N/A 10/21/2021    Procedure: tunneled central venous catheter placement;  Surgeon: Donnie Robles MD;  Location: [facility] ANGIO INVASIVE LOCATION;  Service: Interventional Radiology;  Laterality: N/A;   • INTERVENTIONAL RADIOLOGY PROCEDURE N/A 1/27/2022    Procedure: tunneled central venous catheter placement;  Surgeon: Donnie Robles MD;  Location: [facility] ANGIO INVASIVE LOCATION;  Service: Interventional Radiology;  Laterality: N/A;     PT Assessment (last 12 hours)     PT Evaluation and Treatment     Row Name 07/05/22 1310          Physical Therapy Time and Intention    Subjective Information no complaints  -RONNY     Document Type therapy note (daily note)  -RONNY     Mode of Treatment individual therapy;physical therapy  -RONNY     Patient Effort good  -     Row Name 07/05/22 1310          General Information    Patient Observations lethargic;alert  -     Row Name 07/05/22 1310          Pain    Pretreatment Pain Rating 0/10 - no pain  -RONNY     Posttreatment Pain Rating 0/10 - no pain  -     Row Name 07/05/22 1310          Cognition    Affect/Mental Status (Cognition) --  -RONNY     Orientation Status (Cognition) oriented to;person  -RONNY     Follows Commands (Cognition) --  -RONNY     Row Name 07/05/22 1310          Bed Mobility    Bed Mobility supine-sit  -RONNY     Supine-Sit [place] (Bed Mobility) standby assist  -RONNY     Supine-Sit-Supine [place] (Bed Mobility) --  -RONNY     Assistive Device (Bed Mobility) head of bed elevated;bed rails  -RONNY     Row Name 07/05/22 1310          Transfers    Transfers sit-stand transfer;stand-sit transfer;bed-chair transfer  -RONNY     Bed-Chair [place]  (Transfers) contact guard  -     Assistive Device (Bed-Chair Transfers) walker, front-wheeled  -     Sit-Stand [place] (Transfers) contact guard  -     Stand-Sit [place] (Transfers) contact guard;verbal cues  -     Row Name 07/05/22 1310          Sit-Stand Transfer    Assistive Device (Sit-Stand Transfers) walker, front-wheeled  -RONNY     Row Name 07/05/22 1310          Stand-Sit Transfer    Assistive Device (Stand-Sit Transfers) walker, front-wheeled  -RONNY     Row Name 07/05/22 1310          Motor Skills    Therapeutic Exercise hip;knee;ankle  -     Row Name 07/05/22 1310          Hip (Therapeutic Exercise)    Hip (Therapeutic Exercise) AROM (active range of motion)  -     Hip AROM (Therapeutic Exercise) bilateral;flexion  -     Row Name 07/05/22 1310          Knee (Therapeutic Exercise)    Knee (Therapeutic Exercise) AROM (active range of motion)  -     Knee AROM (Therapeutic Exercise) bilateral;LAQ (long arc quad)  -     Row Name 07/05/22 1310          Ankle (Therapeutic Exercise)    Ankle (Therapeutic Exercise) AROM (active range of motion)  -     Ankle AROM (Therapeutic Exercise) bilateral;dorsiflexion;plantarflexion  -     Row Name             Wound 06/25/22 2100 Left lower leg    Wound - Properties Group Placement Date: 06/25/22  - Placement Time: 2100 -KH Present on Hospital Admission: Y  -KH Side: Left  -KH Orientation: lower  -KH Location: leg  -KH     Retired Wound - Properties Group Placement Date: 06/25/22  - Placement Time: 2100 -KH Present on Hospital Admission: Y  -KH Side: Left  -KH Orientation: lower  -KH Location: leg  -KH     Retired Wound - Properties Group Date first assessed: 06/25/22  - Time first assessed: 2100 -KH Present on Hospital Admission: Y  -KH Side: Left  -KH Location: leg  -     Row Name             Wound 07/01/22 2000 Left anterior groin MASD (Moisture associated skin damage)    Wound - Properties Group Placement Date: 07/01/22  -  Placement Time: 2000 -LS Side: Left  -LS Orientation: anterior  -LS Location: groin  -LS Primary Wound Type: MASD  -LS     Retired Wound - Properties Group Placement Date: 07/01/22 -LS Placement Time: 2000 -LS Side: Left  -LS Orientation: anterior  -LS Location: groin  -LS Primary Wound Type: MASD  -LS     Retired Wound - Properties Group Date first assessed: 07/01/22 -LS Time first assessed: 2000 -LS Side: Left  -LS Location: groin  -LS Primary Wound Type: MASD  -LS     Row Name             Wound 07/01/22 2000 Right anterior groin MASD (Moisture associated skin damage)    Wound - Properties Group Placement Date: 07/01/22 -LS Placement Time: 2000 -LS Side: Right  -LS Orientation: anterior  -LS Location: groin  -LS Primary Wound Type: MASD  -LS     Retired Wound - Properties Group Placement Date: 07/01/22 -LS Placement Time: 2000 -LS Side: Right  -LS Orientation: anterior  -LS Location: groin  -LS Primary Wound Type: MASD  -LS     Retired Wound - Properties Group Date first assessed: 07/01/22 -LS Time first assessed: 2000 -LS Side: Right  -LS Location: groin  -LS Primary Wound Type: MASD  -LS     Row Name             Wound Left heel Other (comment)    Wound - Properties Group Side: Left  -AK Location: heel  -AK Primary Wound Type: Other  -AK     Retired Wound - Properties Group Side: Left  -AK Location: heel  -AK Primary Wound Type: Other  -AK     Retired Wound - Properties Group Side: Left  -AK Location: heel  -AK Primary Wound Type: Other  -AK     Row Name 07/05/22 1310          Vital Signs    Pre Systolic BP Rehab 138  -JA     Pre Treatment Diastolic BP 78  -JA     Post Systolic BP Rehab 116  -JA     Post Treatment Diastolic BP 66  -JA     Pretreatment Heart Rate (beats/min) 79  -JA     Posttreatment Heart Rate (beats/min) 74  -JA     Pre SpO2 (%) 99  -JA     O2 Delivery Pre Treatment supplemental O2  -JA     Post SpO2 (%) 99  -JA     O2 Delivery Post Treatment supplemental O2  -JA     Pre Patient  Position Supine  -     Intra Patient Position Standing  -     Post Patient Position Sitting  -     Row Name 07/05/22 1310          Positioning and Restraints    Pre-Treatment Position in bed  -     Post Treatment Position chair  -     In Chair sitting;call light within reach;encouraged to call for assist;exit alarm on;reclined;notified nsg  -     Row Name 07/05/22 1310          Progress Summary (PT)    Progress Toward Functional Goals (PT) progress toward functional goals as expected  -     Row Name 07/05/22 1310          Bed Mobility Goal 1 (PT)    Activity/Assistive Device (Bed Mobility Goal 1, PT) bed mobility activities, all  -     [place] Level/Cues Needed (Bed Mobility Goal 1, PT) modified independence  -JA     Time Frame (Bed Mobility Goal 1, PT) 10 days  -     Progress/Outcomes (Bed Mobility Goal 1, PT) goal not met  -     Row Name 07/05/22 1310          Transfer Goal 1 (PT)    Activity/Assistive Device (Transfer Goal 1, PT) bed-to-chair/chair-to-bed;toilet  -     [place] Level/Cues Needed (Transfer Goal 1, PT) modified independence  -     Time Frame (Transfer Goal 1, PT) 2 weeks;3 weeks  -     Progress/Outcome (Transfer Goal 1, PT) goal not met  -     Row Name 07/05/22 1310          Gait Training Goal 1 (PT)    Activity/Assistive Device (Gait Training Goal 1, PT) gait (walking locomotion);assistive device use  -     [place] Level (Gait Training Goal 1, PT) modified independence  -     Distance (Gait Training Goal 1, PT) 70 ft per trip or more  -     Time Frame (Gait Training Goal 1, PT) 3 weeks  -     Progress/Outcome (Gait Training Goal 1, PT) goal not met  -           User Key  (r) = Recorded By, (t) = Taken By, (c) = Cosigned By    Initials Name Provider Type    Aminata Navas, RN Registered Nurse    Perez Nowak PTA Physical Therapist Assistant    Caroline Pandey RN Registered Nurse    Isatu Odonnell RN Registered Nurse                 Physical Therapy Education                 Title: PT OT SLP Therapies (In Progress)     Topic: Physical Therapy (In Progress)     Point: Mobility training (In Progress)     Learning Progress Summary           Patient Acceptance, E, NR by  at 7/2/2022 1623    Comment: POC                   Point: Home exercise program (In Progress)     Learning Progress Summary           Patient Acceptance, E, NR by  at 7/2/2022 1623    Comment: POC                   Point: Body mechanics (In Progress)     Learning Progress Summary           Patient Acceptance, E, NR by  at 7/2/2022 1623    Comment: POC                   Point: Precautions (In Progress)     Learning Progress Summary           Patient Acceptance, E, NR by  at 7/2/2022 1623    Comment: POC                               User Key     Initials Effective Dates Name Provider Type Discipline     06/16/21 -  Gauri Anderson, PT Physical Therapist PT              PT Recommendation and Plan  Anticipated Discharge Disposition (PT): skilled nursing facility  Progress Summary (PT)  Progress Toward Functional Goals (PT): progress toward functional goals as expected  Plan of Care Reviewed With: patient  Progress: improving  Outcome Evaluation: Pt. more alert this p.m. & agreeable to treatment. Pt. transferred sup-sit SBA, sit-stand-sit CGA v.c.'s for hand placement, pt. transferred bed-chair st. pivot with RW CGA, pt. performed x15-20 reps seated therex, pt. left sitting up in recliner with all needs within reach this p.m. Cont. to mobilize   Outcome Measures     Row Name 07/03/22 8418             How much help from another is currently needed...    Putting on and taking off regular lower body clothing? 2  -RB      Bathing (including washing, rinsing, and drying) 2  -RB      Toileting (which includes using toilet bed pan or urinal) 2  -RB      Putting on and taking off regular upper body clothing 3  -RB      Taking care of personal grooming (such as  brushing teeth) 3  -RB      Eating meals 4  -RB      AM-PAC 6 Clicks Score (OT) 16  -RB              Functional Assessment    Outcome Measure Options AM-PAC 6 Clicks Daily Activity (OT)  -RB            User Key  (r) = Recorded By, (t) = Taken By, (c) = Cosigned By    Initials Name Provider Type    RB Toney Mantilla, OT Occupational Therapist                 Time Calculation:    PT Charges     Row Name 07/05/22 1353             Time Calculation    Start Time 1310  -JA      Stop Time 1353  -JA      Time Calculation (min) 43 min  -JA              Time Calculation- PT    Total Timed Code Minutes- PT 43 minute(s)  -JA              Timed Charges    22237 - PT Therapeutic Exercise Minutes 15  -JA      99956 - PT Therapeutic Activity Minutes 28  -JA              Total Minutes    Timed Charges Total Minutes 43  -JA       Total Minutes 43  -JA            User Key  (r) = Recorded By, (t) = Taken By, (c) = Cosigned By    Initials Name Provider Type    Perez Nowak PTA Physical Therapist Assistant              Therapy Charges for Today     Code Description Service Date Service Provider Modifiers Qty    93122077533 HC PT THER PROC EA 15 MIN 7/5/2022 Perez Lutz PTA GP 1    77117863892 HC PT THERAPEUTIC ACT EA 15 MIN 7/5/2022 Perez Lutz PTA GP 2          PT G-Codes  Outcome Measure Options: AM-PAC 6 Clicks Daily Activity (OT)  AM-PAC 6 Clicks Score (PT): 7  AM-PAC 6 Clicks Score (OT): 16    Perez Lutz PTA  7/5/2022

## 2022-07-06 LAB
ALBUMIN SERPL-MCNC: 3.5 G/DL (ref 3.5–5.2)
ALBUMIN/GLOB SERPL: 0.8 G/DL
ALP SERPL-CCNC: 220 U/L (ref 39–117)
ALT SERPL W P-5'-P-CCNC: 5 U/L (ref 1–33)
ANION GAP SERPL CALCULATED.3IONS-SCNC: 15 MMOL/L (ref 5–15)
AST SERPL-CCNC: 14 U/L (ref 1–32)
BASOPHILS # BLD AUTO: 0.09 10*3/MM3 (ref 0–0.2)
BASOPHILS NFR BLD AUTO: 1 % (ref 0–1.5)
BILIRUB SERPL-MCNC: <0.2 MG/DL (ref 0–1.2)
BUN SERPL-MCNC: 40 MG/DL (ref 8–23)
BUN/CREAT SERPL: 8 (ref 7–25)
CALCIUM SPEC-SCNC: 9.2 MG/DL (ref 8.6–10.5)
CHLORIDE SERPL-SCNC: 93 MMOL/L (ref 98–107)
CO2 SERPL-SCNC: 24 MMOL/L (ref 22–29)
CREAT SERPL-MCNC: 4.99 MG/DL (ref 0.57–1)
CRP SERPL-MCNC: 7.72 MG/DL (ref 0–0.5)
DEPRECATED RDW RBC AUTO: 51.6 FL (ref 37–54)
EGFRCR SERPLBLD CKD-EPI 2021: 9.2 ML/MIN/1.73
EOSINOPHIL # BLD AUTO: 0.24 10*3/MM3 (ref 0–0.4)
EOSINOPHIL NFR BLD AUTO: 2.7 % (ref 0.3–6.2)
ERYTHROCYTE [DISTWIDTH] IN BLOOD BY AUTOMATED COUNT: 16.6 % (ref 12.3–15.4)
GLOBULIN UR ELPH-MCNC: 4.5 GM/DL
GLUCOSE BLDC GLUCOMTR-MCNC: 172 MG/DL (ref 70–130)
GLUCOSE BLDC GLUCOMTR-MCNC: 232 MG/DL (ref 70–130)
GLUCOSE BLDC GLUCOMTR-MCNC: 246 MG/DL (ref 70–130)
GLUCOSE BLDC GLUCOMTR-MCNC: 293 MG/DL (ref 70–130)
GLUCOSE BLDC GLUCOMTR-MCNC: 299 MG/DL (ref 70–130)
GLUCOSE SERPL-MCNC: 319 MG/DL (ref 65–99)
HCT VFR BLD AUTO: 32.2 % (ref 34–46.6)
HGB BLD-MCNC: 10 G/DL (ref 12–15.9)
IMM GRANULOCYTES # BLD AUTO: 0.14 10*3/MM3 (ref 0–0.05)
IMM GRANULOCYTES NFR BLD AUTO: 1.6 % (ref 0–0.5)
LYMPHOCYTES # BLD AUTO: 1.91 10*3/MM3 (ref 0.7–3.1)
LYMPHOCYTES NFR BLD AUTO: 21.2 % (ref 19.6–45.3)
MAGNESIUM SERPL-MCNC: 2.3 MG/DL (ref 1.6–2.4)
MCH RBC QN AUTO: 26.8 PG (ref 26.6–33)
MCHC RBC AUTO-ENTMCNC: 31.1 G/DL (ref 31.5–35.7)
MCV RBC AUTO: 86.3 FL (ref 79–97)
MONOCYTES # BLD AUTO: 0.6 10*3/MM3 (ref 0.1–0.9)
MONOCYTES NFR BLD AUTO: 6.7 % (ref 5–12)
NEUTROPHILS NFR BLD AUTO: 6.01 10*3/MM3 (ref 1.7–7)
NEUTROPHILS NFR BLD AUTO: 66.8 % (ref 42.7–76)
NRBC BLD AUTO-RTO: 0 /100 WBC (ref 0–0.2)
PLATELET # BLD AUTO: 262 10*3/MM3 (ref 140–450)
PMV BLD AUTO: 8.2 FL (ref 6–12)
POTASSIUM SERPL-SCNC: 4.6 MMOL/L (ref 3.5–5.2)
PROT SERPL-MCNC: 8 G/DL (ref 6–8.5)
RBC # BLD AUTO: 3.73 10*6/MM3 (ref 3.77–5.28)
SODIUM SERPL-SCNC: 132 MMOL/L (ref 136–145)
WBC NRBC COR # BLD: 8.99 10*3/MM3 (ref 3.4–10.8)

## 2022-07-06 PROCEDURE — 85025 COMPLETE CBC W/AUTO DIFF WBC: CPT | Performed by: STUDENT IN AN ORGANIZED HEALTH CARE EDUCATION/TRAINING PROGRAM

## 2022-07-06 PROCEDURE — 99231 SBSQ HOSP IP/OBS SF/LOW 25: CPT | Performed by: PSYCHIATRY & NEUROLOGY

## 2022-07-06 PROCEDURE — 99232 SBSQ HOSP IP/OBS MODERATE 35: CPT | Performed by: STUDENT IN AN ORGANIZED HEALTH CARE EDUCATION/TRAINING PROGRAM

## 2022-07-06 PROCEDURE — 25010000002 EPOETIN ALFA PER 1000 UNITS: Performed by: INTERNAL MEDICINE

## 2022-07-06 PROCEDURE — 63710000001 ONDANSETRON ODT 4 MG TABLET DISPERSIBLE: Performed by: STUDENT IN AN ORGANIZED HEALTH CARE EDUCATION/TRAINING PROGRAM

## 2022-07-06 PROCEDURE — 97110 THERAPEUTIC EXERCISES: CPT

## 2022-07-06 PROCEDURE — 25010000002 HEPARIN (PORCINE) PER 1000 UNITS: Performed by: INTERNAL MEDICINE

## 2022-07-06 PROCEDURE — 86140 C-REACTIVE PROTEIN: CPT | Performed by: STUDENT IN AN ORGANIZED HEALTH CARE EDUCATION/TRAINING PROGRAM

## 2022-07-06 PROCEDURE — 97116 GAIT TRAINING THERAPY: CPT

## 2022-07-06 PROCEDURE — 63710000001 INSULIN DETEMIR PER 5 UNITS: Performed by: STUDENT IN AN ORGANIZED HEALTH CARE EDUCATION/TRAINING PROGRAM

## 2022-07-06 PROCEDURE — 80053 COMPREHEN METABOLIC PANEL: CPT | Performed by: STUDENT IN AN ORGANIZED HEALTH CARE EDUCATION/TRAINING PROGRAM

## 2022-07-06 PROCEDURE — 63710000001 INSULIN ASPART PER 5 UNITS: Performed by: STUDENT IN AN ORGANIZED HEALTH CARE EDUCATION/TRAINING PROGRAM

## 2022-07-06 PROCEDURE — 82962 GLUCOSE BLOOD TEST: CPT

## 2022-07-06 PROCEDURE — 83735 ASSAY OF MAGNESIUM: CPT | Performed by: STUDENT IN AN ORGANIZED HEALTH CARE EDUCATION/TRAINING PROGRAM

## 2022-07-06 PROCEDURE — 97535 SELF CARE MNGMENT TRAINING: CPT

## 2022-07-06 RX ORDER — HEPARIN SODIUM 1000 [USP'U]/ML
4000 INJECTION, SOLUTION INTRAVENOUS; SUBCUTANEOUS AS NEEDED
Status: DISCONTINUED | OUTPATIENT
Start: 2022-07-06 | End: 2022-07-07 | Stop reason: HOSPADM

## 2022-07-06 RX ORDER — INSULIN ASPART 100 [IU]/ML
10 INJECTION, SOLUTION INTRAVENOUS; SUBCUTANEOUS 4 TIMES DAILY
Status: DISCONTINUED | OUTPATIENT
Start: 2022-07-06 | End: 2022-07-07 | Stop reason: HOSPADM

## 2022-07-06 RX ORDER — ALBUMIN (HUMAN) 12.5 G/50ML
12.5 SOLUTION INTRAVENOUS AS NEEDED
Status: ACTIVE | OUTPATIENT
Start: 2022-07-06 | End: 2022-07-06

## 2022-07-06 RX ORDER — ONDANSETRON 4 MG/1
4 TABLET, ORALLY DISINTEGRATING ORAL EVERY 8 HOURS PRN
Status: DISCONTINUED | OUTPATIENT
Start: 2022-07-06 | End: 2022-07-07 | Stop reason: HOSPADM

## 2022-07-06 RX ADMIN — INSULIN ASPART 6 UNITS: 100 INJECTION, SOLUTION INTRAVENOUS; SUBCUTANEOUS at 08:05

## 2022-07-06 RX ADMIN — INSULIN ASPART 4 UNITS: 100 INJECTION, SOLUTION INTRAVENOUS; SUBCUTANEOUS at 16:54

## 2022-07-06 RX ADMIN — LEVOTHYROXINE SODIUM 25 MCG: 25 TABLET ORAL at 06:12

## 2022-07-06 RX ADMIN — HEPARIN SODIUM 4000 UNITS: 1000 INJECTION INTRAVENOUS; SUBCUTANEOUS at 13:31

## 2022-07-06 RX ADMIN — Medication 10 ML: at 21:10

## 2022-07-06 RX ADMIN — DOCUSATE SODIUM 50 MG AND SENNOSIDES 8.6 MG 2 TABLET: 8.6; 5 TABLET, FILM COATED ORAL at 14:06

## 2022-07-06 RX ADMIN — Medication 10 ML: at 14:08

## 2022-07-06 RX ADMIN — FLUCONAZOLE 200 MG: 200 TABLET ORAL at 14:06

## 2022-07-06 RX ADMIN — METOPROLOL TARTRATE 25 MG: 25 TABLET, FILM COATED ORAL at 21:03

## 2022-07-06 RX ADMIN — LINEZOLID 600 MG: 600 TABLET, FILM COATED ORAL at 14:06

## 2022-07-06 RX ADMIN — NYSTATIN: 100000 POWDER TOPICAL at 21:04

## 2022-07-06 RX ADMIN — INSULIN ASPART 2 UNITS: 100 INJECTION, SOLUTION INTRAVENOUS; SUBCUTANEOUS at 14:05

## 2022-07-06 RX ADMIN — ISOSORBIDE MONONITRATE 30 MG: 30 TABLET, EXTENDED RELEASE ORAL at 14:06

## 2022-07-06 RX ADMIN — GUAIFENESIN 1200 MG: 600 TABLET, EXTENDED RELEASE ORAL at 14:06

## 2022-07-06 RX ADMIN — Medication 10 ML: at 08:05

## 2022-07-06 RX ADMIN — GUAIFENESIN 1200 MG: 600 TABLET, EXTENDED RELEASE ORAL at 21:03

## 2022-07-06 RX ADMIN — LINEZOLID 600 MG: 600 TABLET, FILM COATED ORAL at 21:35

## 2022-07-06 RX ADMIN — INSULIN ASPART 10 UNITS: 100 INJECTION, SOLUTION INTRAVENOUS; SUBCUTANEOUS at 16:53

## 2022-07-06 RX ADMIN — NYSTATIN: 100000 POWDER TOPICAL at 08:06

## 2022-07-06 RX ADMIN — INSULIN ASPART 10 UNITS: 100 INJECTION, SOLUTION INTRAVENOUS; SUBCUTANEOUS at 08:04

## 2022-07-06 RX ADMIN — ONDANSETRON 4 MG: 4 TABLET, ORALLY DISINTEGRATING ORAL at 16:36

## 2022-07-06 RX ADMIN — INSULIN DETEMIR 25 UNITS: 100 INJECTION, SOLUTION SUBCUTANEOUS at 21:05

## 2022-07-06 RX ADMIN — INSULIN ASPART 10 UNITS: 100 INJECTION, SOLUTION INTRAVENOUS; SUBCUTANEOUS at 21:04

## 2022-07-06 RX ADMIN — GABAPENTIN 300 MG: 300 CAPSULE ORAL at 14:06

## 2022-07-06 RX ADMIN — EPOETIN ALFA 6000 UNITS: 10000 SOLUTION INTRAVENOUS; SUBCUTANEOUS at 15:22

## 2022-07-06 RX ADMIN — METOPROLOL TARTRATE 25 MG: 25 TABLET, FILM COATED ORAL at 14:06

## 2022-07-06 RX ADMIN — INSULIN ASPART 10 UNITS: 100 INJECTION, SOLUTION INTRAVENOUS; SUBCUTANEOUS at 14:05

## 2022-07-06 RX ADMIN — PANTOPRAZOLE SODIUM 40 MG: 40 INJECTION, POWDER, FOR SOLUTION INTRAVENOUS at 14:05

## 2022-07-06 RX ADMIN — INSULIN DETEMIR 25 UNITS: 100 INJECTION, SOLUTION SUBCUTANEOUS at 08:05

## 2022-07-06 NOTE — PROGRESS NOTES
"7/6/2022    Chief Complaint:  Accidental overdose    Subjective:  Patient is a 62 y.o. female that is currently inpatient on med/surg services. She is seen today while in bed getting dialysis tx.  Pt reports she is doing well, she is wanting to go home but at this time requires more strengthening.    Pt states her mood is fine, she continues to state over taking of medication was mistake. Called and spoke with her , he states that it was an accident, that she had no idea that she took the wrong medication.          Objective     Vital Signs    Temp:  [96.9 °F (36.1 °C)-97.5 °F (36.4 °C)] 97.5 °F (36.4 °C)  Heart Rate:  [70-81] 75  Resp:  [18] 18  BP: (100-149)/(50-76) 100/50    Physical Exam:   General Appearance: alert, appears stated age and cooperative,  Hygiene:   fair  Gait & Station: in bed  Musculoskeletal: No tremors or abnormal involuntary movements    Mental Status Exam:   Cooperation:  Cooperative  Eye Contact:  Fair  Psychomotor Behavior:  Appropriate  Mood: \"Fine\"  Affect:  mood-congruent  Speech:  Normal  Thought Process:  Coherent  Associations: Goal Directed  Thought Content:     Mood congruent   Suicidal:  None   Homicidal:  None   Hallucinations:  None   Delusion:  None  Cognitive Functioning:   Consciousness: awake, alert and oriented  Reliability:  fair  Insight:  Fair  Judgement:  Fair  Impulse Control:  Fair    Lab Results (last 24 hours)     Procedure Component Value Units Date/Time    POC Glucose Once [[id number]]  (Abnormal) Collected: 07/06/22 1402    Specimen: Blood Updated: 07/06/22 1425     Glucose 172 mg/dL      Comment: RN NotifiedOperator: [id number] [facility] ID: [id number]       POC Glucose Once [[id number]]  (Abnormal) Collected: 07/06/22 1045    Specimen: Blood Updated: 07/06/22 1057     Glucose 299 mg/dL      Comment: RN NotifiedOperator: [id number] MARKS ALEXISMeter ID: [id number]       Magnesium [[id number]]  (Normal) Collected: 07/06/22 0551    Specimen: Blood " Updated: 07/06/22 1056     Magnesium 2.3 mg/dL     POC Glucose Once [[id number]]  (Abnormal) Collected: 07/06/22 0611    Specimen: Blood Updated: 07/06/22 0626     Glucose 293 mg/dL      Comment: RN NotifiedOperator: [id number] DARIEN Cruz ID: [id number]       Comprehensive Metabolic Panel [[id number]]  (Abnormal) Collected: 07/06/22 0551    Specimen: Blood Updated: 07/06/22 0621     Glucose 319 mg/dL      BUN 40 mg/dL      Creatinine 4.99 mg/dL      Sodium 132 mmol/L      Potassium 4.6 mmol/L      Chloride 93 mmol/L      CO2 24.0 mmol/L      Calcium 9.2 mg/dL      Total Protein 8.0 g/dL      Albumin 3.50 g/dL      ALT (SGPT) 5 U/L      AST (SGOT) 14 U/L      Alkaline Phosphatase 220 U/L      Total Bilirubin <0.2 mg/dL      Globulin 4.5 gm/dL      A/G Ratio 0.8 g/dL      BUN/Creatinine Ratio 8.0     Anion Gap 15.0 mmol/L      eGFR 9.2 mL/min/1.73      Comment: <15 Indicative of kidney failure       Narrative:      GFR Normal >60  Chronic Kidney Disease <60  Kidney Failure <15      C-reactive Protein [[id number]]  (Abnormal) Collected: 07/06/22 0551    Specimen: Blood Updated: 07/06/22 0619     C-Reactive Protein 7.72 mg/dL     CBC Auto Differential [[id number]]  (Abnormal) Collected: 07/06/22 0551    Specimen: Blood Updated: 07/06/22 0601     WBC 8.99 10*3/mm3      RBC 3.73 10*6/mm3      Hemoglobin 10.0 g/dL      Hematocrit 32.2 %      MCV 86.3 fL      MCH 26.8 pg      MCHC 31.1 g/dL      RDW 16.6 %      RDW-SD 51.6 fl      MPV 8.2 fL      Platelets 262 10*3/mm3      Neutrophil % 66.8 %      Lymphocyte % 21.2 %      Monocyte % 6.7 %      Eosinophil % 2.7 %      Basophil % 1.0 %      Immature Grans % 1.6 %      Neutrophils, Absolute 6.01 10*3/mm3      Lymphocytes, Absolute 1.91 10*3/mm3      Monocytes, Absolute 0.60 10*3/mm3      Eosinophils, Absolute 0.24 10*3/mm3      Basophils, Absolute 0.09 10*3/mm3      Immature Grans, Absolute 0.14 10*3/mm3      nRBC 0.0 /100 WBC     POC Glucose Once [[id number]]   (Abnormal) Collected: 07/05/22 1931    Specimen: Blood Updated: 07/05/22 2012     Glucose 318 mg/dL      Comment: RN NotifiedOperator: [id number] DARIEN Cruz ID: [id number]       POC Glucose Once [[id number]]  (Abnormal) Collected: 07/05/22 1612    Specimen: Blood Updated: 07/05/22 1623     Glucose 291 mg/dL      Comment: RN NotifiedOperator: [id number] SELINA FAYEISMeter ID: [id number]           Imaging Results (Last 24 Hours)     ** No results found for the last 24 hours. **          Medicine:   Current Facility-Administered Medications:   •  acetaminophen (TYLENOL) tablet 500 mg, 500 mg, Oral, Q6H PRN, Perez Hooker MD, 500 mg at 07/03/22 1613  •  sennosides-docusate (PERICOLACE) 8.6-50 MG per tablet 2 tablet, 2 tablet, Oral, BID, 2 tablet at 07/06/22 1406 **AND** polyethylene glycol (MIRALAX) packet 17 g, 17 g, Oral, Daily PRN, 17 g at 07/01/22 1249 **AND** bisacodyl (DULCOLAX) EC tablet 5 mg, 5 mg, Oral, Daily PRN, 5 mg at 06/29/22 1715 **AND** bisacodyl (DULCOLAX) suppository 10 mg, 10 mg, Rectal, Daily PRN, Charlene Castro MD  •  bumetanide (BUMEX) tablet 2 mg, 2 mg, Oral, Once per day on Sun Tue Thu SatXin Imran, MD, 2 mg at 07/05/22 0840  •  dextrose (D50W) (25 g/50 mL) IV injection 10-50 mL, 10-50 mL, Intravenous, Q15 Min PRN, Charlene Castro MD  •  dextrose (D50W) (25 g/50 mL) IV injection 25 g, 25 g, Intravenous, Q15 Min PRN, Jung Leonard MD  •  dextrose (GLUTOSE) oral gel 15 g, 15 g, Oral, Q15 Min PRN, Jung Leonard MD  •  epoetin hubert (EPOGEN,PROCRIT) injection 6,000 Units, 6,000 Units, Intravenous, Once per day on Mon Wed Fri, Ace Lauren MD, 6,000 Units at 07/06/22 1522  •  fluconazole (DIFLUCAN) tablet 200 mg, 200 mg, Oral, Once per day on Mon Wed Fri, 200 mg at 07/06/22 1406 **AND** fluconazole (DIFLUCAN) tablet 100 mg, 100 mg, Oral, Once per day on Sun Tue Thu Sat, Jerman Coffman MD, 100 mg at 07/05/22 0839  •  gabapentin (NEURONTIN) capsule 300 mg, 300 mg,  Nasogastric, Daily, Perez Hooker MD, 300 mg at 07/06/22 1406  •  glucagon (human recombinant) (GLUCAGEN DIAGNOSTIC) injection 1 mg, 1 mg, Intramuscular, Q15 Min PRN, Jung Leonard MD  •  guaiFENesin (MUCINEX) 12 hr tablet 1,200 mg, 1,200 mg, Oral, Q12H, Perez Hooker MD, 1,200 mg at 07/06/22 1406  •  guaiFENesin (ROBITUSSIN) 100 MG/5ML oral solution 400 mg, 400 mg, Oral, Q6H PRN, Jerman Coffman MD  •  heparin (porcine) injection 4,000 Units, 4,000 Units, Intracatheter, PRN, Ace Lauren MD, 4,000 Units at 07/06/22 1331  •  hydrALAZINE (APRESOLINE) injection 10 mg, 10 mg, Intravenous, Q6H PRN, Jung Leonard MD, 10 mg at 07/01/22 0034  •  Insulin Aspart (novoLOG) injection 0-9 Units, 0-9 Units, Subcutaneous, TID AC, Perez Hooker MD, 2 Units at 07/06/22 1405  •  Insulin Aspart (novoLOG) injection 10 Units, 10 Units, Subcutaneous, 4x Daily, Perez Hooker MD, 10 Units at 07/06/22 1405  •  insulin detemir (LEVEMIR) injection 25 Units, 25 Units, Subcutaneous, Q12H, Perez Hooker MD, 25 Units at 07/06/22 0805  •  isosorbide mononitrate (IMDUR) 24 hr tablet 30 mg, 30 mg, Oral, Q24H, Jung Leonard MD, 30 mg at 07/06/22 1406  •  levothyroxine (SYNTHROID, LEVOTHROID) tablet 25 mcg, 25 mcg, Oral, Q AM, Perez Hooker MD, 25 mcg at 07/06/22 0612  •  linezolid (ZYVOX) tablet 600 mg, 600 mg, Oral, Q12H, Jerman Coffman MD, 600 mg at 07/06/22 1406  •  lisinopril (PRINIVIL,ZESTRIL) tablet 20 mg, 20 mg, Oral, Once per day on Sun Tue Thu Horacio Haney Lohita, MD, 20 mg at 07/05/22 0840  •  magnesium sulfate 4 gram infusion - Mg less than or equal to 1mg/dL, 4 g, Intravenous, PRN **OR** magnesium sulfate 3 gram infusion (1gm x 3) - Mg 1.1 - 1.5 mg/dL, 1 g, Intravenous, PRN **OR** Magnesium Sulfate 2 gram infusion- Mg 1.6 - 1.9 mg/dL, 2 g, Intravenous, PRN, Charlene Castro MD, Last Rate: 25 mL/hr at 07/01/22 1617, 2 g at 07/01/22 1617  •  metoprolol tartrate (LOPRESSOR) injection 5  mg, 5 mg, Intravenous, Q5 Min PRN, Froylan Lu MD, 5 mg at 22 192  •  metoprolol tartrate (LOPRESSOR) tablet 25 mg, 25 mg, Oral, Q12H, Froylan Lu MD, 25 mg at 22 1406  •  [] morphine injection 1 mg, 1 mg, Intravenous, Q4H PRN, 1 mg at 226 **AND** naloxone (NARCAN) injection 0.4 mg, 0.4 mg, Intravenous, Q5 Min PRN, Charlene aCstro MD  •  nystatin (MYCOSTATIN) powder, , Topical, Q12H, Perez Hookre MD, Given at 22 0806  •  pantoprazole (PROTONIX) injection 40 mg, 40 mg, Intravenous, Q24H, Charlene Castro MD, 40 mg at 22 1405  •  potassium chloride (MICRO-K) CR capsule 40 mEq, 40 mEq, Oral, PRN **OR** potassium chloride (KLOR-CON) packet 40 mEq, 40 mEq, Oral, PRN, 40 mEq at 22 1210 **OR** potassium chloride 10 mEq in 100 mL IVPB, 10 mEq, Intravenous, Q1H PRN, Huy Santa MD  •  [DISCONTINUED] sodium chloride 0.9 % infusion, 200 mL/hr, Intravenous, Continuous PRN, Stopped at 22 0130 **AND** potassium chloride 10 mEq in 100 mL IVPB, 10 mEq, Intravenous, Continuous PRN, Charlene Castro MD, Stopped at 22 0130  •  [DISCONTINUED] sodium chloride 0.45 % infusion, 150 mL/hr, Intravenous, Continuous PRN, Stopped at 22 0211 **AND** potassium chloride 10 mEq in 100 mL IVPB, 10 mEq, Intravenous, Continuous PRN, Charlene Castro MD, Stopped at 22 0212  •  sodium chloride 0.9 % flush 10 mL, 10 mL, Intravenous, Q12H, Jerman Coffman MD, 10 mL at 22 1408  •  sodium chloride 0.9 % flush 10 mL, 10 mL, Intravenous, Q12H, Jerman Coffman MD, 10 mL at 22 1408  •  sodium chloride 0.9 % flush 10 mL, 10 mL, Intravenous, Q12H, Jerman Coffman MD, 10 mL at 22 0805  •  sodium chloride 0.9 % flush 10 mL, 10 mL, Intravenous, PRN, Jerman Coffman MD  •  sodium chloride 0.9 % flush 20 mL, 20 mL, Intravenous, PRN, Jerman Coffman MD    Diagnoses/Assessment:     Ventilator associated pneumonia (HCC)    Hypertension    COPD  (chronic obstructive pulmonary disease) (HCC)    Coronary artery disease involving native coronary artery of native heart without angina pectoris    Hypothyroidism    Acute cystitis with hematuria    Type 2 diabetes mellitus with autonomic neuropathy (HCC)    ESRD on hemodialysis (HCC)    Accidental drug overdose    Acute respiratory failure with hypoxia and hypercapnia (HCC)    Anemia    Positive fecal occult blood test    Yeast UTI      Treatment Plan:    Pt reports that she is doing well, denies depression. Continues to report overdose was accidental.  Spoke with spouse and he agrees that it was an accident and that when she is in better physical health he has no worry about her coming home.      Behavior health will sign off on this case, thank you for the opportunity. Please contact us for questions or any concerns.     Thank you     BRIDGETT Austin  07/06/22  15:44 CDT

## 2022-07-06 NOTE — SIGNIFICANT NOTE
07/06/22 0945   OTHER   Discipline physical therapy assistant   Rehab Time/Intention   Session Not Performed patient unavailable for treatment  (Pt. recieving HD in room at this time will check back in p.m. for treatment)

## 2022-07-06 NOTE — THERAPY TREATMENT NOTE
Patient Name: Yamileth Slater  : 1960    MRN: [id number]                              Today's Date: 2022       Admit Date: 2022    Visit Dx:     ICD-10-CM ICD-9-CM   1. Altered mental status, unspecified altered mental status type  R41.82 780.97   2. Acute respiratory failure, unspecified whether with hypoxia or hypercapnia ([facility])  J96.00 518.81   3. Acute pulmonary edema ([facility])  J81.0 518.4   4. ESRD (end stage renal disease) ([facility])  N18.6 585.6   5. Hyperglycemia  R73.9 790.29   6. Pharyngeal dysphagia  R13.13 787.23   7. Impaired functional mobility, balance, gait, and endurance  Z74.09 V49.89   8. Impaired mobility and activities of daily living  Z74.09 V49.89    Z78.9      Patient Active Problem List   Diagnosis   • Chronic midline low back pain without sciatica   • Vitamin D deficiency   • Tobacco dependence syndrome   • Shoulder joint pain   • Peripheral vascular disease ([facility])   • Morbid obesity with BMI of 50.0-59.9, adult ([facility])   • Mixed hyperlipidemia   • Kidney stone   • History of colon polyps   • GERD (gastroesophageal reflux disease)   • Excoriated eczema   • Hypertension   • COPD (chronic obstructive pulmonary disease) ([facility])   • Chronic folliculitis   • Coronary artery disease involving native coronary artery of native heart without angina pectoris   • 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   • Hypothyroidism   • Carotid artery disease ([facility])   • [place] abuse   • PAD (peripheral artery disease) ([facility])   • Venous insufficiency   • LAFB (left anterior fascicular block)   • Pulmonary hypertension ([facility])   • Left hip pain   • Neck pain   • MIKE and COPD overlap syndrome ([facility])   • Pneumonia due to COVID-19 virus   • Hyperkalemia   • Acute cystitis with hematuria   • Cutaneous candidiasis   • Community acquired pneumonia of right middle lobe of lung   • MRSA infection   • Esophageal dysphagia   • Monilial esophagitis ([facility])   • NSTEMI, initial episode of care ([facility])   •  Cellulitis of left leg   • Urinary tract infection due to Proteus   • History of insertion of tunneled central venous catheter (CVC) with port   • Anemia due to chronic kidney disease, on chronic dialysis ([facility])   • Pneumonitis   • Personal history of noncompliance with medical treatment, presenting hazards to health   • Type 2 diabetes mellitus with autonomic neuropathy ([facility])   • Physical deconditioning   • ESRD on hemodialysis ([facility])   • DVT prophylaxis   • Accidental drug overdose   • Acute respiratory failure with hypoxia and hypercapnia ([facility])   • Anemia   • Ventilator associated pneumonia ([facility])   • Positive fecal occult blood test   • Yeast UTI     Past Medical History:   Diagnosis Date   • Acute blood loss anemia 4/16/2017    Likely due to gastric oozing at this time. - Dr. Duarte (GI) was consulted and has now signed off, will follow up outpatient - pill colonoscopy showed AVMs - continue to monitor   • Acute cystitis with hematuria 3/31/2021    1/13: IV Rocephin 1 gm q 24 1/14 : transitioned  to omnicef 300mg. Urine cultures resulted and did not show growth. Omnicef discontinued as patient is asymptomatic   • Altered mental status 1/9/2022    - AMS on presentation - initial ABG pH 7.3, CO2 34 - Procal 0.29 - UA negative for acute cysitits -CTA head wnl  - Empiric Zosyn and Vancomycin -Lactate 2.5 on admission  - blood cultures no growth at 24 hours     • Anxiety    • CAD (coronary artery disease) 4/24/2021    S/P 3 stents 5/1/2021 for [facility] Continue ASA 81mg & Clopidogrel 75mg Continue Atorvastatin 40mg   • Carotid artery stenosis    • Chronic obstructive lung disease (HCC)    • CKD (chronic kidney disease) stage 4, GFR 15-29 ml/min ([facility])    • CKD (chronic kidney disease), symptom management only, stage 5 ([facility]) 10/5/2020    Results from last 7 days Lab Units 12/15/21 0548 12/14/21 1323 12/14/21 0916 CREATININE mg/dL 3.92* 3.21* 3.32*  Baseline creatinine 2-3 GFR 13-25 GFR 15 Dialysis MWF, sees Dr. Lauren  Nephrology consult,, appreciate recommendations Continue Bumex 1mg bid daily Holding Bumex 2mg 4 times a week   • Colonic polyp    • Coronary arteriosclerosis    • Diabetes mellitus (HCC)    • Diabetic neuropathy (HCC)    • Ear pain, right 10/18/2021    - canal trauma due to patient scratching and DMT2 - added cortisporin ear drops   • Elevated troponin 10/12/2021    -most likely from CKD -Trending down -Neg chest pain   • Generalized abdominal pain 7/1/2022    Could be due to initiation of tube feeds vs dyspepsia vs abdominal cramps related to no PO intake due to intubation vs constipation Continue current laxative regiment  If no bowel movement by this afternoon will consider enema   • GERD (gastroesophageal reflux disease)    • GI bleed 5/13/2021    - GI will follow up outpatient - Protonix 40mg daily - Avoid medical DVT prophy and use mechanical at this time instead. - Continue to monitor - pill colonoscopy results showed AVMs   • History of transfusion    • Hypercholesterolemia    • Hyperosmolar hyperglycemic state (HHS) ([facility]) 6/25/2022    Serum glucose 605 on admission  Anion gap 16 PH 7.37 Bicarb 27.9 Continue fluids  Insulin drip with HHS protocol  Anion gap closed around 10 AM, received one dose of Levamir subq, will stop insulin drip after 2 hrs     • Hypertension    • Hypokalemia 5/27/2022    Will replace as needed. Will be cautious in the setting of ESRD to avoid need for emergency dialysis   Monitor Qtc intervals on EKG     • Hypomagnesemia 6/27/2021    Monitor and replace   • Morbid obesity (HCC)    • Nephrolithiasis    • On mechanically assisted ventilation ([facility]) 6/26/2022    Vent management and sedation orders placed.  - [facility] intensivist group consulted for vent management appreciate recommendations  - plan to extubate today     • Peripheral vascular disease (HCC)    • Pleural effusion on right 6/26/2022    CXR on 6/30/22 read as a small upper left pulmonary edema vs early pneumonia.  Last  Echo 1/2022 EF 61-65 % Continue to monitor  Procal slightly improved, CRP improved On Linezolid and meropenum      • SIRS (systemic inflammatory response syndrome) ([facility]) 1/9/2022    Admission  - WBC 17.78   -   - RR 16 - 1/10: VSS/wnl - CXR - Mild pulm edema - Blood cultures no growth at 24 hours  - Procalcitonin 0.29 - UA : glucose 1000, negative Leucocytes/nitrate - Empiric Zosyn and Vancomcyin    • Sleep apnea    • Substance abuse ([facility])    • Vitamin D deficiency      Past Surgical History:   Procedure Laterality Date   • CARDIAC CATHETERIZATION N/A 7/14/2020   • CARDIAC CATHETERIZATION N/A 4/23/2021    Procedure: Left Heart Cath;  Surgeon: Melba Romo MD;  Location: [facility] CATH INVASIVE LOCATION;  Service: Cardiology;  Laterality: N/A;   • CARDIAC CATHETERIZATION N/A 4/30/2021    Procedure: Percutaneous Coronary Intervention;  Surgeon: Russell Voss MD;  Location: [facility] CATH INVASIVE LOCATION;  Service: Cardiovascular;  Laterality: N/A;   • CARDIAC CATHETERIZATION N/A 4/30/2021    Procedure: Stent NIKKI coronary;  Surgeon: Russell Voss MD;  Location: [facility] CATH INVASIVE LOCATION;  Service: Cardiovascular;  Laterality: N/A;   • CARDIAC CATHETERIZATION Left 11/13/2021    Procedure: Left Heart Cath;  Surgeon: Niall Rios MD;  Location: [facility] CATH INVASIVE LOCATION;  Service: Cardiology;  Laterality: Left;   • CAROTID STENT Left    • COLONOSCOPY     • COLONOSCOPY N/A 5/14/2021    Procedure: COLONOSCOPY;  Surgeon: Mingo Duarte MD;  Location: [facility] ENDOSCOPY;  Service: Gastroenterology;  Laterality: N/A;   • CORONARY ARTERY BYPASS GRAFT N/A 2013    CABG X 3   • CYSTOSCOPY BLADDER STONE LITHOTRIPSY Bilateral    • ENDOSCOPY N/A 4/12/2021    Procedure: ESOPHAGOGASTRODUODENOSCOPY;  Surgeon: Mingo Duarte MD;  Location: [facility] ENDOSCOPY;  Service: Gastroenterology;  Laterality: N/A;   • ENDOSCOPY N/A 5/14/2021    Procedure: ESOPHAGOGASTRODUODENOSCOPY;  Surgeon: Felicia  MD Mingo;  Location: [facility] ENDOSCOPY;  Service: Gastroenterology;  Laterality: N/A;   • ENDOSCOPY N/A 1/28/2022    Procedure: ESOPHAGOGASTRODUODENOSCOPY;  Surgeon: Mingo Duarte MD;  Location: [facility] ENDOSCOPY;  Service: Gastroenterology;  Laterality: N/A;   • INTERVENTIONAL RADIOLOGY PROCEDURE N/A 10/21/2021    Procedure: tunneled central venous catheter placement;  Surgeon: Donnie Robles MD;  Location: [facility] ANGIO INVASIVE LOCATION;  Service: Interventional Radiology;  Laterality: N/A;   • INTERVENTIONAL RADIOLOGY PROCEDURE N/A 1/27/2022    Procedure: tunneled central venous catheter placement;  Surgeon: Donnie Robles MD;  Location: [facility] ANGIO INVASIVE LOCATION;  Service: Interventional Radiology;  Laterality: N/A;      General Information     Row Name 07/06/22 0812          OT Time and Intention    Document Type therapy note (daily note)  -RC     Mode of Treatment individual therapy;occupational therapy  -RC     Row Name 07/06/22 0812          General Information    Patient Profile Reviewed yes  -RC     Existing Precautions/Restrictions fall  -RC     Row Name 07/06/22 0812          Cognition    Orientation Status (Cognition) oriented to;person  -RC     Row Name 07/06/22 0812          Safety Issues, Functional Mobility    Impairments Affecting Function (Mobility) endurance/activity tolerance  -           User Key  (r) = Recorded By, (t) = Taken By, (c) = Cosigned By    Initials Name Provider Type    RC Jyoti Jaramillo COTA Occupational Therapist Assistant                 Mobility/ADL's    No documentation.                Obj/Interventions    No documentation.                Goals/Plan     Row Name 07/06/22 0812          Transfer Goal 1 (OT)    Activity/Assistive Device (Transfer Goal 1, OT) transfers, all  -RC     [place] Level/Cues Needed (Transfer Goal 1, OT) contact guard required  -RC     Time Frame (Transfer Goal 1, OT) long term goal (LTG)  -     Progress/Outcome  (Transfer Goal 1, OT) goal not met  -RC     Row Name 07/06/22 0812          Bathing Goal 1 (OT)    Activity/Device (Bathing Goal 1, OT) upper body bathing  -RC     [place] Level/Cues Needed (Bathing Goal 1, OT) supervision required  -RC     Time Frame (Bathing Goal 1, OT) long term goal (LTG)  -RC     Progress/Outcomes (Bathing Goal 1, OT) goal not met  -RC     Row Name 07/06/22 0812          Dressing Goal 1 (OT)    Activity/Device (Dressing Goal 1, OT) dressing skills, all  -RC     [place]/Cues Needed (Dressing Goal 1, OT) contact guard required  -RC     Time Frame (Dressing Goal 1, OT) long term goal (LTG)  -RC     Progress/Outcome (Dressing Goal 1, OT) goal not met  -RC     Row Name 07/06/22 0812          Toileting Goal 1 (OT)    Activity/Device (Toileting Goal 1, OT) toileting skills, all  -RC     [place] Level/Cues Needed (Toileting Goal 1, OT) supervision required  -RC     Time Frame (Toileting Goal 1, OT) long term goal (LTG)  -RC     Progress/Outcome (Toileting Goal 1, OT) goal not met  -RC           User Key  (r) = Recorded By, (t) = Taken By, (c) = Cosigned By    Initials Name Provider Type    RC Jyoti Jaramillo COTA Occupational Therapist Assistant               Clinical Impression     Row Name 07/06/22 0812          Pain Assessment    Pretreatment Pain Rating 0/10 - no pain  -RC     Posttreatment Pain Rating 0/10 - no pain  -     Row Name 07/06/22 0812          Therapy Assessment/Plan (OT)    Rehab Potential (OT) good, to achieve stated therapy goals  -     Criteria for Skilled Therapeutic Interventions Met (OT) yes;meets criteria  -     Therapy Frequency (OT) other (see comments)  3-7 days a week  -     Row Name 07/06/22 0812          Therapy Plan Review/Discharge Plan (OT)    Anticipated Discharge Disposition (OT) home with 24/7 care;home with home health;home with assist  -RC           User Key  (r) = Recorded By, (t) = Taken By, (c) = Cosigned By    Initials Name Provider  Type     Jyoti Jaramillo COTA Occupational Therapist Assistant               Outcome Measures     Row Name 07/06/22 0812          How much help from another is currently needed...    Putting on and taking off regular lower body clothing? 2  -RC     Bathing (including washing, rinsing, and drying) 2  -RC     Toileting (which includes using toilet bed pan or urinal) 2  -RC     Putting on and taking off regular upper body clothing 3  -RC     Taking care of personal grooming (such as brushing teeth) 3  -RC     Eating meals 4  -RC     AM-PAC 6 Clicks Score (OT) 16  -RC     Row Name 07/06/22 0812          Functional Assessment    Outcome Measure Options AM-PAC 6 Clicks Daily Activity (OT)  -           User Key  (r) = Recorded By, (t) = Taken By, (c) = Cosigned By    Initials Name Provider Type     Jyoti Jaramillo COTA Occupational Therapist Assistant                Occupational Therapy Education                 Title: PT OT SLP Therapies (In Progress)     Topic: Occupational Therapy (In Progress)     Point: ADL training (In Progress)     Description:   Instruct learner(s) on proper safety adaptation and remediation techniques during self care or transfers.   Instruct in proper use of assistive devices.              Learning Progress Summary           Patient Acceptance, E, NR by  at 7/6/2022 0910                   Point: Home exercise program (Not Started)     Description:   Instruct learner(s) on appropriate technique for monitoring, assisting and/or progressing therapeutic exercises/activities.              Learner Progress:  Not documented in this visit.          Point: Precautions (In Progress)     Description:   Instruct learner(s) on prescribed precautions during self-care and functional transfers.              Learning Progress Summary           Patient Acceptance, E, NR by RC at 7/6/2022 0910    Acceptance, E, VU by RB at 7/3/2022 6541    Comment: Edu pt on use of gait belt and non skid socks when OOB  and no OOB without assist.                   Point: Body mechanics (In Progress)     Description:   Instruct learner(s) on proper positioning and spine alignment during self-care, functional mobility activities and/or exercises.              Learning Progress Summary           Patient Acceptance, E, NR by  at 7/6/2022 0910                               User Key     Initials Effective Dates Name Provider Type Discipline    RB 06/16/21 -  Toney Mantilla, OT Occupational Therapist OT     06/16/21 -  Jyoti Jaramillo COTA Occupational Therapist Assistant OT              OT Recommendation and Plan  Therapy Frequency (OT): other (see comments) (3-7 days a week)  Plan of Care Review  Plan of Care Reviewed With: patient  Progress: improving  Outcome Evaluation: Nursing ok'dOT pt agreeable participated in ue ex w/ 1# wt, sit<>stand cga toilet transfer chair<>bsc cga w/ vc's, toilet cga using r/w and bsc   cont poc     Time Calculation:    Time Calculation- OT     Row Name 07/06/22 0911             Time Calculation- OT    OT Start Time 0812  -RC      OT Stop Time 0912  -      OT Time Calculation (min) 60 min  -      Total Timed Code Minutes- OT 60 minute(s)  -      OT Received On 07/06/22  -              Timed Charges    66913 - OT Therapeutic Exercise Minutes 35  -RC      75961 - OT Self Care/Mgmt Minutes 25  -RC              Total Minutes    Timed Charges Total Minutes 60  -RC       Total Minutes 60  -RC            User Key  (r) = Recorded By, (t) = Taken By, (c) = Cosigned By    Initials Name Provider Type     Jyoti Jaramillo COTA Occupational Therapist Assistant              Therapy Charges for Today     Code Description Service Date Service Provider Modifiers Qty    34202359498 HC OT THER PROC EA 15 MIN 7/6/2022 Jyoti Jaramillo COTA GO 2    49943499149 HC OT SELF CARE/MGMT/TRAIN EA 15 MIN 7/6/2022 Jyoti Jaramillo COTA GO 2               ISHAN Bedoya  7/6/2022

## 2022-07-06 NOTE — PROGRESS NOTES
SUBJECTIVE:   7/5/2022  Chief Complaint:     Subjective      Patient feels better today.  Denied abdominal pain, nausea or vomiting.  Hemoglobin is 9.8.  Ambulating with PT.  Tolerating soft diet.  Had psychiatry eval    History:  Past Medical History:   Diagnosis Date   • Acute blood loss anemia 4/16/2017    Likely due to gastric oozing at this time. - Dr. Duarte (GI) was consulted and has now signed off, will follow up outpatient - pill colonoscopy showed AVMs - continue to monitor   • Acute cystitis with hematuria 3/31/2021    1/13: IV Rocephin 1 gm q 24 1/14 : transitioned  to omnicef 300mg. Urine cultures resulted and did not show growth. Omnicef discontinued as patient is asymptomatic   • Altered mental status 1/9/2022    - AMS on presentation - initial ABG pH 7.3, CO2 34 - Procal 0.29 - UA negative for acute cysitits -CTA head wnl  - Empiric Zosyn and Vancomycin -Lactate 2.5 on admission  - blood cultures no growth at 24 hours     • Anxiety    • CAD (coronary artery disease) 4/24/2021    S/P 3 stents 5/1/2021 for [facility] Continue ASA 81mg & Clopidogrel 75mg Continue Atorvastatin 40mg   • Carotid artery stenosis    • Chronic obstructive lung disease (HCC)    • CKD (chronic kidney disease) stage 4, GFR 15-29 ml/min ([facility])    • CKD (chronic kidney disease), symptom management only, stage 5 (HCC) 10/5/2020    Results from last 7 days Lab Units 12/15/21 0548 12/14/21 1323 12/14/21 0916 CREATININE mg/dL 3.92* 3.21* 3.32*  Baseline creatinine 2-3 GFR 13-25 GFR 15 Dialysis MWF, sees Dr. Lauren Nephrology consult,, appreciate recommendations Continue Bumex 1mg bid daily Holding Bumex 2mg 4 times a week   • Colonic polyp    • Coronary arteriosclerosis    • Diabetes mellitus (HCC)    • Diabetic neuropathy (HCC)    • Ear pain, right 10/18/2021    - canal trauma due to patient scratching and DMT2 - added cortisporin ear drops   • Elevated troponin 10/12/2021    -most likely from CKD -Trending down -Neg chest pain   •  Generalized abdominal pain 7/1/2022    Could be due to initiation of tube feeds vs dyspepsia vs abdominal cramps related to no PO intake due to intubation vs constipation Continue current laxative regiment  If no bowel movement by this afternoon will consider enema   • GERD (gastroesophageal reflux disease)    • GI bleed 5/13/2021    - GI will follow up outpatient - Protonix 40mg daily - Avoid medical DVT prophy and use mechanical at this time instead. - Continue to monitor - pill colonoscopy results showed AVMs   • History of transfusion    • Hypercholesterolemia    • Hyperosmolar hyperglycemic state (HHS) ([facility]) 6/25/2022    Serum glucose 605 on admission  Anion gap 16 PH 7.37 Bicarb 27.9 Continue fluids  Insulin drip with [facility] protocol  Anion gap closed around 10 AM, received one dose of Levamir subq, will stop insulin drip after 2 hrs     • Hypertension    • Hypokalemia 5/27/2022    Will replace as needed. Will be cautious in the setting of ESRD to avoid need for emergency dialysis   Monitor Qtc intervals on EKG     • Hypomagnesemia 6/27/2021    Monitor and replace   • Morbid obesity ([facility])    • Nephrolithiasis    • On mechanically assisted ventilation ([facility]) 6/26/2022    Vent management and sedation orders placed.  - [facility] intensivist group consulted for vent management appreciate recommendations  - plan to extubate today     • Peripheral vascular disease ([facility])    • Pleural effusion on right 6/26/2022    CXR on 6/30/22 read as a small upper left pulmonary edema vs early pneumonia.  Last Echo 1/2022 EF 61-65 % Continue to monitor  Procal slightly improved, CRP improved On Linezolid and meropenum      • SIRS (systemic inflammatory response syndrome) ([facility]) 1/9/2022    Admission  - WBC 17.78   -   - RR 16 - 1/10: VSS/wnl - CXR - Mild pulm edema - Blood cultures no growth at 24 hours  - Procalcitonin 0.29 - UA : glucose 1000, negative Leucocytes/nitrate - Empiric Zosyn and Vancomcyin    • Sleep apnea    •  Substance abuse (HCC)    • Vitamin D deficiency      Past Surgical History:   Procedure Laterality Date   • CARDIAC CATHETERIZATION N/A 7/14/2020   • CARDIAC CATHETERIZATION N/A 4/23/2021    Procedure: Left Heart Cath;  Surgeon: Melba Romo MD;  Location: [facility] CATH INVASIVE LOCATION;  Service: Cardiology;  Laterality: N/A;   • CARDIAC CATHETERIZATION N/A 4/30/2021    Procedure: Percutaneous Coronary Intervention;  Surgeon: Russell Voss MD;  Location: [facility] CATH INVASIVE LOCATION;  Service: Cardiovascular;  Laterality: N/A;   • CARDIAC CATHETERIZATION N/A 4/30/2021    Procedure: Stent NIKKI coronary;  Surgeon: Russell Voss MD;  Location: [facility] CATH INVASIVE LOCATION;  Service: Cardiovascular;  Laterality: N/A;   • CARDIAC CATHETERIZATION Left 11/13/2021    Procedure: Left Heart Cath;  Surgeon: Niall Rios MD;  Location: [facility] CATH INVASIVE LOCATION;  Service: Cardiology;  Laterality: Left;   • CAROTID STENT Left    • COLONOSCOPY     • COLONOSCOPY N/A 5/14/2021    Procedure: COLONOSCOPY;  Surgeon: Mingo Duarte MD;  Location: [facility] ENDOSCOPY;  Service: Gastroenterology;  Laterality: N/A;   • CORONARY ARTERY BYPASS GRAFT N/A 2013    CABG X 3   • CYSTOSCOPY BLADDER STONE LITHOTRIPSY Bilateral    • ENDOSCOPY N/A 4/12/2021    Procedure: ESOPHAGOGASTRODUODENOSCOPY;  Surgeon: Mingo Duarte MD;  Location: [facility] ENDOSCOPY;  Service: Gastroenterology;  Laterality: N/A;   • ENDOSCOPY N/A 5/14/2021    Procedure: ESOPHAGOGASTRODUODENOSCOPY;  Surgeon: Mingo Duarte MD;  Location: [facility] ENDOSCOPY;  Service: Gastroenterology;  Laterality: N/A;   • ENDOSCOPY N/A 1/28/2022    Procedure: ESOPHAGOGASTRODUODENOSCOPY;  Surgeon: Mingo Duarte MD;  Location: [facility] ENDOSCOPY;  Service: Gastroenterology;  Laterality: N/A;   • INTERVENTIONAL RADIOLOGY PROCEDURE N/A 10/21/2021    Procedure: tunneled central venous catheter placement;  Surgeon: Donnie Robles MD;  Location:   [facility]GIO INVASIVE LOCATION;  Service: Interventional Radiology;  Laterality: N/A;   • INTERVENTIONAL RADIOLOGY PROCEDURE N/A 2022    Procedure: tunneled central venous catheter placement;  Surgeon: Donnie Robles MD;  Location: [facility] INVASIVE LOCATION;  Service: Interventional Radiology;  Laterality: N/A;     Family History   Problem Relation Age of Onset   • Heart disease Mother    • Lung cancer Mother    • Heart disease Father    • Heart attack Father    • Diabetes Father    • Heart disease Half-Sister         Dad's side   • Heart disease Brother    • No Known Problems Sister    • No Known Problems Sister    • No Known Problems Sister    • No Known Problems Sister    • No Known Problems Sister    • Pancreatic cancer Half-Sister         Dad's side   • No Known Problems Brother    • No Known Problems Brother    • Heart attack Half-Brother    • Heart attack Half-Brother    • No Known Problems Maternal Grandmother    • No Known Problems Maternal Grandfather    • No Known Problems Paternal Grandmother    • No Known Problems Paternal Grandfather      Social History     Tobacco Use   • Smoking status: Former Smoker     Packs/day: 0.25     Years: 46.00     Pack years: 11.50     Types: Cigarettes     Start date: 1999     Quit date: 2/15/2022     Years since quittin.3   • Smokeless tobacco: Never Used   • Tobacco comment: only smoking 5 a day - quit 2021   Substance Use Topics   • Alcohol use: No   • Drug use: Not Currently     Types: LSD, Marijuana, Methamphetamines     Medications Prior to Admission   Medication Sig Dispense Refill Last Dose   • acetaminophen (Tylenol 8 Hour) 650 MG 8 hr tablet Take 1 tablet by mouth Every 8 (Eight) Hours As Needed for Mild Pain . 90 tablet 0    • albuterol (PROVENTIL) (2.5 MG/3ML) 0.083% nebulizer solution Inhale the contents of 1 vial by nebulization Every 4 (Four) Hours As Needed for Wheezing. 75 mL 3    • albuterol sulfate  (90 Base)  "MCG/ACT inhaler Inhale 2 puffs Every 4 (Four) Hours As Needed for Wheezing. 18 g 1    • aspirin (aspirin) 81 MG EC tablet Take 1 tablet by mouth Daily. 60 tablet 5    • atorvastatin (LIPITOR) 20 MG tablet Take 1 tablet by mouth every night at bedtime. 90 tablet 0    • Blood Glucose Monitoring Suppl ([employer] Blood Glucose Meter) w/Device kit 1 each 3 (Three) Times a Day. 1 kit 3    • bumetanide (BUMEX) 2 MG tablet Take 1 tablet by mouth 4 (Four) Times a Week. Take on non-hemodialysis days. 16 tablet 0    • Capsaicin 0.035 % cream Apply 3 g topically 3 (Three) Times a Day As Needed (ankle pain). 42.5 g 2    • Cetirizine HCl 10 MG capsule Take 1 capsule by mouth Daily.      • clopidogrel (PLAVIX) 75 MG tablet Take 1 tablet by mouth Daily. 30 tablet 5    • Continuous Blood Gluc  (FreeStyle Jade 2 [place]) device 1 each Continuous. 1 each 0    • Continuous Blood Gluc Sensor (FreeStyle Jade 2 Sensor) misc 1 each Every 14 (Fourteen) Days. 2 each 11    • cyclobenzaprine (FLEXERIL) 5 MG tablet Take 2 tablets by mouth At Night As Needed for Muscle Spasms. 90 tablet 0    • Diclofenac Sodium (VOLTAREN) 1 % gel gel Apply 4 g topically to the appropriate area as directed 4 (Four) Times a Day As Needed (Ankle pain). 350 g 2    • DULoxetine (CYMBALTA) 20 MG capsule Take 2 capsules by mouth Daily for 90 days. 180 capsule 0    • Easy Touch Insulin Syringe 30G X 5/16\" 0.5 ML misc USE AS DIRECTED WITH LEVEMIR      • EASY TOUCH PEN NEEDLES 31G X 8 MM misc       • gabapentin (NEURONTIN) 300 MG capsule Take 1 capsule by mouth Daily. And an extra pill M/W/F - dialysis days 45 capsule 2    • glucose blood test strip Use as instructed 100 each 12    • insulin detemir (LEVEMIR) 100 UNIT/ML injection Inject 25 Units under the skin into the appropriate area as directed Every Night. 3 mL 12    • Insulin Lispro, 1 Unit Dial, (HUMALOG) 100 UNIT/ML solution pen-injector Inject 12 Units under the skin into the appropriate area as directed 3 " (Three) Times a Day With Meals. 30 mL 12    • Insulin Pen Needle (NovoFine) 30G X 8 MM misc As directed 4 times daily 100 each 11    • Insulin Pen Needle 31G X 8 MM misc Use to inject insulin 4 (Four) Times a Day as directed. 120 each 12    • ipratropium-albuterol (DUO-NEB) 0.5-2.5 mg/3 ml nebulizer Take 3 mL by nebulization 4 (Four) Times a Day.      • isosorbide mononitrate (IMDUR) 30 MG 24 hr tablet Take 1 tablet by mouth Every Morning. 90 tablet 1    • levothyroxine (SYNTHROID, LEVOTHROID) 25 MCG tablet Take 25 mcg by mouth Daily.      • lisinopril (PRINIVIL,ZESTRIL) 20 MG tablet Take 1 tablet by mouth Daily. 90 tablet 0    • Methoxy PEG-Epoetin Beta (MIRCERA IJ) 150 mcg Every 14 (Fourteen) Days.      • Methoxy PEG-Epoetin Beta (MIRCERA IJ) 225 mcg Every 14 (Fourteen) Days.      • metoprolol tartrate (LOPRESSOR) 25 MG tablet Take 1 tablet by mouth Every 12 (Twelve) Hours. 180 tablet 0    • montelukast (SINGULAIR) 10 MG tablet Take 1 tablet by mouth Every Night. 90 tablet 0    • muscle rub (BenGay) 10-15 % cream cream Apply 1 application topically to the appropriate area as directed 4 (Four) Times a Day As Needed for buttock pain. 85 g 1    • nitroglycerin (NITROSTAT) 0.4 MG SL tablet Place 0.4 mg under the tongue Every 5 (Five) Minutes As Needed for Chest Pain (x 3 doses).      • O2 (OXYGEN) Inhale 3 L/min Continuous.      • ondansetron ODT (Zofran ODT) 4 MG disintegrating tablet Place 1 tablet on the tongue Every 8 (Eight) Hours As Needed for Nausea or Vomiting. 30 tablet 0    • oxybutynin XL (DITROPAN-XL) 5 MG 24 hr tablet Take 1 tablet by mouth Daily. 90 tablet 0    • pantoprazole (PROTONIX) 40 MG EC tablet Take 1 tablet by mouth Every Night. 90 tablet 0    • promethazine (PHENERGAN) 25 MG suppository Insert  into the rectum Every 6 (Six) Hours.      • ranolazine (RANEXA) 500 MG 12 hr tablet Take 1 tablet by mouth Every 12 (Twelve) Hours. 180 tablet 0    • Semaglutide (Rybelsus) 7 MG tablet Take 7 mg by mouth  Daily. 90 tablet 0    • sevelamer (RENVELA) 800 MG tablet Take 800 mg by mouth 3 (Three) Times a Day With Meals.      • sodium bicarbonate 650 MG tablet Take 1 tablet by mouth.        Allergies:  Adhesive tape, Latex, Nsaids, and Other     CURRENT MEDICATIONS/OBJECTIVE/VS/PE:     Current Medications:     Current Facility-Administered Medications   Medication Dose Route Frequency Provider Last Rate Last Admin   • acetaminophen (TYLENOL) tablet 500 mg  500 mg Oral Q6H PRN Perez Hooker MD   500 mg at 07/03/22 1613   • sennosides-docusate (PERICOLACE) 8.6-50 MG per tablet 2 tablet  2 tablet Oral BID Charlene Castro MD   2 tablet at 07/03/22 2010    And   • polyethylene glycol (MIRALAX) packet 17 g  17 g Oral Daily PRN Charlene Castro MD   17 g at 07/01/22 1249    And   • bisacodyl (DULCOLAX) EC tablet 5 mg  5 mg Oral Daily PRN Charlene Castro MD   5 mg at 06/29/22 1715    And   • bisacodyl (DULCOLAX) suppository 10 mg  10 mg Rectal Daily PRN Charlene Castro MD       • bumetanide (BUMEX) tablet 2 mg  2 mg Oral Once per day on Sun Tue Thu Sat Ace Lauren MD   2 mg at 07/05/22 0840   • dextrose (D50W) (25 g/50 mL) IV injection 10-50 mL  10-50 mL Intravenous Q15 Min PRN Charlene Castro MD       • dextrose (D50W) (25 g/50 mL) IV injection 25 g  25 g Intravenous Q15 Min PRN Jung Leonard MD       • dextrose (GLUTOSE) oral gel 15 g  15 g Oral Q15 Min PRN Jung Leonard MD       • epoetin hubert (EPOGEN,PROCRIT) injection 6,000 Units  6,000 Units Intravenous Once per day on Mon Wed Fri Ace Lauren MD   6,000 Units at 07/04/22 1231   • [START ON 7/6/2022] fluconazole (DIFLUCAN) tablet 200 mg  200 mg Oral Once per day on Mon Wed Fri Jerman Coffman MD        And   • fluconazole (DIFLUCAN) tablet 100 mg  100 mg Oral Once per day on Sun Tue Thu Sat Jerman Coffman MD   100 mg at 07/05/22 0839   • gabapentin (NEURONTIN) capsule 300 mg  300 mg Nasogastric Daily Perez Hooker MD   300 mg at  07/05/22 0840   • glucagon (human recombinant) (GLUCAGEN DIAGNOSTIC) injection 1 mg  1 mg Intramuscular Q15 Min PRN Jung Leonard MD       • guaiFENesin (MUCINEX) 12 hr tablet 1,200 mg  1,200 mg Oral Q12H Perez Hooker MD   1,200 mg at 07/05/22 2051   • guaiFENesin (ROBITUSSIN) 100 MG/5ML oral solution 400 mg  400 mg Oral Q6H PRN Jerman Coffman MD       • heparin (porcine) injection 4,000 Units  4,000 Units Intracatheter PRN Ace Lauren MD   4,000 Units at 07/04/22 1332   • hydrALAZINE (APRESOLINE) injection 10 mg  10 mg Intravenous Q6H PRN Jung Leonard MD   10 mg at 07/01/22 0034   • Insulin Aspart (novoLOG) injection 0-9 Units  0-9 Units Subcutaneous TID AC Perez Hooker MD   6 Units at 07/05/22 1650   • Insulin Aspart (novoLOG) injection 8 Units  8 Units Subcutaneous 4x Daily Perez Hooker MD   8 Units at 07/05/22 2054   • insulin detemir (LEVEMIR) injection 25 Units  25 Units Subcutaneous Q12H Perez Hooker MD   25 Units at 07/05/22 2053   • isosorbide mononitrate (IMDUR) 24 hr tablet 30 mg  30 mg Oral Q24H Jung Leonard MD   30 mg at 07/05/22 0846   • levothyroxine (SYNTHROID, LEVOTHROID) tablet 25 mcg  25 mcg Oral Q AM Perez Hooker MD   25 mcg at 07/05/22 0840   • linezolid (ZYVOX) tablet 600 mg  600 mg Oral Q12H Jerman Coffman MD   600 mg at 07/05/22 2051   • lisinopril (PRINIVIL,ZESTRIL) tablet 20 mg  20 mg Oral Once per day on Sun Tue Thu Sat Jung Leonard MD   20 mg at 07/05/22 0840   • magnesium sulfate 4 gram infusion - Mg less than or equal to 1mg/dL  4 g Intravenous PRN Charlene Castro MD        Or   • magnesium sulfate 3 gram infusion (1gm x 3) - Mg 1.1 - 1.5 mg/dL  1 g Intravenous PRN Charlene Castro MD        Or   • Magnesium Sulfate 2 gram infusion- Mg 1.6 - 1.9 mg/dL  2 g Intravenous PRN Charlene Castro MD 25 mL/hr at 07/01/22 1617 2 g at 07/01/22 1617   • metoprolol tartrate (LOPRESSOR) injection 5 mg  5 mg Intravenous Q5 Min PRN  Froylan Lu MD   5 mg at 06/26/22 1921   • metoprolol tartrate (LOPRESSOR) tablet 25 mg  25 mg Oral Q12H Froylan Lu MD   25 mg at 07/05/22 2051   • naloxone (NARCAN) injection 0.4 mg  0.4 mg Intravenous Q5 Min PRN Charlene Castro MD       • nystatin (MYCOSTATIN) powder   Topical Q12H Perez Hooker MD   Given at 07/05/22 2052   • pantoprazole (PROTONIX) injection 40 mg  40 mg Intravenous Q24H Charlene Castro MD   40 mg at 07/05/22 0842   • potassium chloride (MICRO-K) CR capsule 40 mEq  40 mEq Oral PRN Huy Santa MD        Or   • potassium chloride (KLOR-CON) packet 40 mEq  40 mEq Oral PRN Huy Santa MD   40 mEq at 07/02/22 1210    Or   • potassium chloride 10 mEq in 100 mL IVPB  10 mEq Intravenous Q1H PRN Huy Santa MD       • potassium chloride 10 mEq in 100 mL IVPB  10 mEq Intravenous Continuous PRN Charlene Castro MD   Stopped at 06/26/22 0130   • potassium chloride 10 mEq in 100 mL IVPB  10 mEq Intravenous Continuous PRN Charlene Castro MD   Stopped at 06/26/22 0212   • sodium chloride 0.9 % flush 10 mL  10 mL Intravenous Q12H Jerman Coffman MD   10 mL at 07/05/22 2053   • sodium chloride 0.9 % flush 10 mL  10 mL Intravenous Q12H Jerman Coffman MD   10 mL at 07/05/22 2053   • sodium chloride 0.9 % flush 10 mL  10 mL Intravenous Q12H Jerman Coffman MD   10 mL at 07/05/22 2053   • sodium chloride 0.9 % flush 10 mL  10 mL Intravenous PRN Jerman Coffman MD       • sodium chloride 0.9 % flush 20 mL  20 mL Intravenous PRN Jerman Coffman MD           Objective     Review of Systems:   Review of Systems   Constitutional: Negative for chills, fatigue, fever and unexpected weight change.   HENT: Negative for congestion, ear discharge, hearing loss, nosebleeds and sore throat.    Eyes: Negative for pain, discharge and redness.   Respiratory: Negative for cough, chest tightness, shortness of breath and wheezing.    Cardiovascular: Negative for chest pain and palpitations.    Gastrointestinal: Negative for abdominal distention, abdominal pain, blood in stool, constipation, diarrhea, nausea and vomiting.   Endocrine: Negative for cold intolerance, polydipsia, polyphagia and polyuria.   Genitourinary: Negative for dysuria, flank pain, frequency, hematuria and urgency.   Musculoskeletal: Negative for arthralgias, back pain, joint swelling and myalgias.   Skin: Negative for color change, pallor and rash.   Neurological: Negative for tremors, seizures, syncope, weakness and headaches.   Hematological: Negative for adenopathy. Does not bruise/bleed easily.   Psychiatric/Behavioral: Negative for behavioral problems, confusion, dysphoric mood, hallucinations and suicidal ideas. The patient is not nervous/anxious.        Physical Exam:   Temp:  [95.9 °F (35.5 °C)-97.3 °F (36.3 °C)] 97 °F (36.1 °C)  Heart Rate:  [64-81] 81  Resp:  [18-20] 18  BP: (110-142)/(55-64) 117/55     Physical Exam:  General Appearance:    Alert, cooperative, in no acute distress   Head:    Normocephalic, without obvious abnormality, atraumatic   Eyes:            Lids and lashes normal, conjunctivae and sclerae normal, no   icterus, no pallor, corneas clear, PERRLA   Ears:    Ears appear intact with no abnormalities noted   Throat:   No oral lesions, no thrush, oral mucosa moist   Neck:   No adenopathy, supple, trachea midline, no thyromegaly, no     carotid bruit, no JVD   Back:     No kyphosis present, no scoliosis present, no skin lesions,       erythema or scars, no tenderness to percussion or                   palpation,   range of motion normal   Lungs:     Clear to auscultation,respirations regular, even and                   unlabored    Heart:    Regular rhythm and normal rate, normal S1 and S2, no            murmur, no gallop, no rub, no click   Breast Exam:    Deferred   Abdomen:     Normal bowel sounds, no masses, no organomegaly, soft        nontender, nondistended, no guarding, no rebound                  tenderness   Genitalia:    Deferred   Extremities:   Moves all extremities well, no edema, no cyanosis, no              redness   Pulses:   Pulses palpable and equal bilaterally   Skin:   No bleeding, bruising or rash   Lymph nodes:   No palpable adenopathy   Neurologic:   Cranial nerves 2 - 12 grossly intact, sensation intact, DTR        present and equal bilaterally      Results Review:     Lab Results (last 24 hours)     Procedure Component Value Units Date/Time    POC Glucose Once [[id number]]  (Abnormal) Collected: 07/05/22 1931    Specimen: Blood Updated: 07/05/22 2012     Glucose 318 mg/dL      Comment: RN NotifiedOperator: [id number] DARIEN WIGGINSAHMeter ID: [id number]       POC Glucose Once [[id number]]  (Abnormal) Collected: 07/05/22 1612    Specimen: Blood Updated: 07/05/22 1623     Glucose 291 mg/dL      Comment: RN NotifiedOperator: [id number] MARKS ALEXISMeter ID: [id number]       POC Glucose Once [[id number]]  (Abnormal) Collected: 07/05/22 1031    Specimen: Blood Updated: 07/05/22 1045     Glucose 262 mg/dL      Comment: RN NotifiedOperator: [id number] MARKS ALEXISMeter ID: [id number]       Blood Culture - Blood, Blood, Central Line [[id number]]  (Normal) Collected: 06/30/22 0746    Specimen: Blood, Central Line Updated: 07/05/22 0803     Blood Culture No growth at 5 days    Blood Culture - Blood, Arm, Right [[id number]]  (Normal) Collected: 06/30/22 0746    Specimen: Blood from Arm, Right Updated: 07/05/22 0803     Blood Culture No growth at 5 days    POC Glucose Once [[id number]]  (Abnormal) Collected: 07/05/22 0717    Specimen: Blood Updated: 07/05/22 0738     Glucose 154 mg/dL      Comment: RN NotifiedOperator: [id number] BROTHERS TownerMeter ID: [id number]       Comprehensive Metabolic Panel [[id number]]  (Abnormal) Collected: 07/05/22 0601    Specimen: Blood Updated: 07/05/22 0643     Glucose 161 mg/dL      BUN 30 mg/dL      Creatinine 3.69 mg/dL      Sodium 132 mmol/L      Potassium 3.5  mmol/L      Chloride 92 mmol/L      CO2 27.0 mmol/L      Calcium 9.7 mg/dL      Total Protein 7.8 g/dL      Albumin 3.30 g/dL      ALT (SGPT) <5 U/L      AST (SGOT) 10 U/L      Alkaline Phosphatase 155 U/L      Total Bilirubin 0.2 mg/dL      Globulin 4.5 gm/dL      A/G Ratio 0.7 g/dL      BUN/Creatinine Ratio 8.1     Anion Gap 13.0 mmol/L      eGFR 13.2 mL/min/1.73      Comment: <15 Indicative of kidney failure       Narrative:      GFR Normal >60  Chronic Kidney Disease <60  Kidney Failure <15      CBC Auto Differential [[id number]]  (Abnormal) Collected: 07/05/22 0601    Specimen: Blood Updated: 07/05/22 0624     WBC 8.98 10*3/mm3      RBC 3.69 10*6/mm3      Hemoglobin 9.8 g/dL      Hematocrit 31.3 %      MCV 84.8 fL      MCH 26.6 pg      MCHC 31.3 g/dL      RDW 16.6 %      RDW-SD 50.4 fl      MPV 8.5 fL      Platelets 276 10*3/mm3      Neutrophil % 62.2 %      Lymphocyte % 22.3 %      Monocyte % 8.8 %      Eosinophil % 4.0 %      Basophil % 1.0 %      Immature Grans % 1.7 %      Neutrophils, Absolute 5.59 10*3/mm3      Lymphocytes, Absolute 2.00 10*3/mm3      Monocytes, Absolute 0.79 10*3/mm3      Eosinophils, Absolute 0.36 10*3/mm3      Basophils, Absolute 0.09 10*3/mm3      Immature Grans, Absolute 0.15 10*3/mm3      nRBC 0.0 /100 WBC            I reviewed the patient's new clinical results.  I reviewed the patient's new imaging results and agree with the interpretation.     ASSESSMENT/PLAN:   ASSESSMENT: 1.  Anemia with heme positive stool, likely due to GI blood loss.  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  Hemoglobin is stable.  2.  Pneumonia, on antibiotics  3.  History of Flexeril overdosage  4.  End-stage renal disease on hemodialysis  5.  Oropharyngeal dysphagia, speech pathology evaluation noted.  PLAN:  1.  Continue PPI and current supportive care  2.  Follow H&H closely and transfuse as needed  3.  Endoscopic evaluation if hemoglobin drops.  4.  PEG tube placement if dysphagia  continues  The risks, benefits, and alternatives of this procedure have been discussed with the patient or the responsible party- the patient understands and agrees to proceed.         Mingo Duarte MD  07/05/22  22:54 CDT

## 2022-07-06 NOTE — PLAN OF CARE
Pt had dialysis this AM; 3L removed per Dialysis RN. Dressing changed to LLE. Pt reports nausea, PRN Zofran ordered. Contact precautions maintained; will continue to monitor.           Goal Outcome Evaluation:

## 2022-07-06 NOTE — PROGRESS NOTES
FAMILY MEDICINE RESIDENCY SERVICE  DAILY PROGRESS NOTE    NAME: Yamileth Slater  : 1960  MRN: [id number]      LOS: 11 days     PROVIDER OF SERVICE: Perez Hooker MD    Chief Complaint: Ventilator associated pneumonia (HCC)    Subjective:     Interval History:  History taken from: patient chart  Sitting up in chair eating breakfast. No new complaints. No soa. Still has fatigue and weakness. Still reports productive cough.     Review of Systems:   Review of Systems   Constitutional: Positive for fatigue. Negative for fever.   Respiratory: Positive for cough. Negative for shortness of breath.    Cardiovascular: Negative for chest pain and leg swelling.   Gastrointestinal: Negative for abdominal pain, nausea and vomiting.   Genitourinary: Negative for dysuria.   Neurological: Positive for weakness.       Objective:     Vital Signs  Temp:  [96.9 °F (36.1 °C)-97.3 °F (36.3 °C)] 97.2 °F (36.2 °C)  Heart Rate:  [64-81] 73  Resp:  [18] 18  BP: (117-149)/(55-76) 149/76  Flow (L/min):  [2] 2   Body mass index is 49.09 kg/m².    Physical Exam  Physical Exam  Vitals reviewed.   Constitutional:       General: She is not in acute distress.     Appearance: She is morbidly obese.      Interventions: Nasal cannula in place.      Comments: 1L NC   HENT:      Head: Normocephalic and atraumatic.   Eyes:      Conjunctiva/sclera: Conjunctivae normal.   Cardiovascular:      Rate and Rhythm: Normal rate and regular rhythm.      Pulses: Normal pulses.      Heart sounds: Normal heart sounds.   Pulmonary:      Effort: Pulmonary effort is normal. No respiratory distress.      Breath sounds: No rhonchi or rales.   Abdominal:      Palpations: Abdomen is soft.      Tenderness: There is no abdominal tenderness.   Musculoskeletal:      Right lower leg: No edema.      Left lower leg: No edema.   Skin:     General: Skin is warm.   Neurological:      Mental Status: She is alert. Mental status is at baseline.   Psychiatric:          Mood and Affect: Mood normal.         Behavior: Behavior normal.         Scheduled Meds   bumetanide, 2 mg, Oral, Once per day on Sun Tue Thu Sat  epoetin hubert/hubert-epbx, 6,000 Units, Intravenous, Once per day on   fluconazole, 200 mg, Oral, Once per day on    And  fluconazole, 100 mg, Oral, Once per day on Sun Tue Thu Sat  gabapentin, 300 mg, Nasogastric, Daily  guaiFENesin, 1,200 mg, Oral, Q12H  Insulin Aspart, 0-9 Units, Subcutaneous, TID AC  Insulin Aspart, 10 Units, Subcutaneous, 4x Daily  insulin detemir, 25 Units, Subcutaneous, Q12H  isosorbide mononitrate, 30 mg, Oral, Q24H  levothyroxine, 25 mcg, Oral, Q AM  linezolid, 600 mg, Oral, Q12H  lisinopril, 20 mg, Oral, Once per day on Sun Tue Thu Sat  metoprolol tartrate, 25 mg, Oral, Q12H  nystatin, , Topical, Q12H  pantoprazole, 40 mg, Intravenous, Q24H  senna-docusate sodium, 2 tablet, Oral, BID  sodium chloride, 10 mL, Intravenous, Q12H  sodium chloride, 10 mL, Intravenous, Q12H  sodium chloride, 10 mL, Intravenous, Q12H       PRN Meds   •  acetaminophen  •  senna-docusate sodium **AND** polyethylene glycol **AND** bisacodyl **AND** bisacodyl  •  dextrose  •  dextrose  •  dextrose  •  glucagon (human recombinant)  •  guaiFENesin  •  heparin (porcine)  •  hydrALAZINE  •  magnesium sulfate **OR** magnesium sulfate in D5W 1g/100mL (PREMIX) **OR** magnesium sulfate  •  metoprolol tartrate  •  [] Morphine **AND** naloxone  •  potassium chloride **OR** potassium chloride **OR** potassium chloride  •  [DISCONTINUED] sodium chloride **AND** potassium chloride  •  [DISCONTINUED] sodium chloride **AND** potassium chloride  •  sodium chloride  •  sodium chloride      Diagnostic Data    Results from last 7 days   Lab Units 22  0551   WBC 10*3/mm3 8.99   HEMOGLOBIN g/dL 10.0*   HEMATOCRIT % 32.2*   PLATELETS 10*3/mm3 262   GLUCOSE mg/dL 319*   CREATININE mg/dL 4.99*   BUN mg/dL 40*   SODIUM mmol/L 132*   POTASSIUM mmol/L 4.6   AST  (SGOT) U/L 14   ALT (SGPT) U/L 5   ALK PHOS U/L 220*   BILIRUBIN mg/dL <0.2   ANION GAP mmol/L 15.0       No radiology results for the last day      I reviewed the patient's new clinical results.    Assessment/Plan:     Active Hospital Problems    Diagnosis  POA   • **Ventilator associated pneumonia (HCC) [J95.851]  Yes     Priority: High     Procal slightly improved   CRP improved, repeat q48 hrs  Blood cx negative   respiratory culture positive for MRSA  On linezolid switched to PO     • Type 2 diabetes mellitus with autonomic neuropathy (HCC) [E11.43]  Yes     Priority: Medium     On moderate SSI  On levemir 25u bid  On 10u QID     • Yeast UTI [B37.49]  Unknown     Ur cx showed yeast isolate  On diflucan started 7/5/22, therapy for 14 days     • Positive fecal occult blood test [R19.5]  Unknown     Hgb stable  GI consulted and appreciate recs     • Accidental drug overdose [T50.901A]  Yes     Family reports of 10 tablets of 10 mg of Flexeril ingested.  - Overdose labs obtained including UDS, acetaminophen, salicylate, ethanol level  - Poison control notified they Recommend symptomatic care  - Monitor Qtc interval changes, improved  - Psych consulted for concern for depression, appreciate recs       • Acute respiratory failure with hypoxia and hypercapnia ([facility]) [J96.01, J96.02]  Yes     On home oxygen 2L  Home Trilogy at night     • Anemia [D64.9]  Yes     Hgb 6.9 on admission   - received 2u PRBC  - Type and screen   - Monitor H &H   -- H&H stable      • ESRD on hemodialysis ([facility]) [N18.6, Z99.2]  Not Applicable     -Receives hemodialysis MWF at [facility] in [place]  Missed dialysis per family prior to arrival   Nephrology consulted appreciated recs          • Acute cystitis with hematuria [N30.01]  Unknown     Ur culture showed yeast isolate  On diflucan started 7/5/22, needs therapy for 14 days  Full dose on HD days after HD  1/2 dose on non-HD days     • Hypothyroidism [E03.9]  Yes     -home synthroid 25  mcg     • Hypertension [I10]  Yes     On home metoprolol and imdur  Lisinopril on non-HD days  BP stable  - PRN Hydralazine       • Coronary artery disease involving native coronary artery of native heart without angina pectoris [I25.10]  Yes     - S/P CABG, Bilateral carotid artery stenosis w/ 2 stents on left side,  PAD (peripheral artery disease) with stent in right upper leg  - Cardiology on board       • COPD (chronic obstructive pulmonary disease) ([facility]) [J44.9]  Yes     On home oxygen 2-3L NC  Pt supplied trilogy at night         DVT prophylaxis: SCDs/TEDs  Code status is   Code Status and Medical Interventions:   Ordered at: 06/25/22 2315     Level Of Support Discussed With:    Patient     Code Status (Patient has no pulse and is not breathing):    CPR (Attempt to Resuscitate)     Medical Interventions (Patient has pulse or is breathing):    Full Support       Plan for disposition:Where: SNF and When:  1-2days      Time: 30 mins      This document has been electronically signed by Perez Hooker MD on July 6, 2022 08:08 CDT

## 2022-07-06 NOTE — PROGRESS NOTES
Adult Nutrition  Assessment    Patient Name:  Yamileth Slater  YOB: 1960  MRN: [id number]  Admit Date:  6/25/2022    Assessment Date:  7/6/2022    Comments:  Pt continues treatment for yeast +UTI and pneumonia. SLP has advanced to Reg textures, thin liquids via spoon only- intakes 100% past 3 meals. On zyvox, FNS aware of restrictions.  Noted admit wt 6/25 292# and # w/ BMI 49.0- is HD 3d/week and nephrology aware of wt loss.  Pt likely to d/c today per MD notes. RD to follow hospital course.         Anthropometrics     Row Name 07/06/22 0603          Anthropometrics    Weight 122 kg (268 lb 6 oz)                    Estimated/Assessed Needs - Anthropometrics     Row Name 07/06/22 0603          Anthropometrics    Weight 122 kg (268 lb 6 oz)                         Electronically signed by:  Roma Simms RD  07/06/22 11:59 CDT

## 2022-07-06 NOTE — PLAN OF CARE
Goal Outcome Evaluation:  Plan of Care Reviewed With: patient        Progress: improving  Outcome Evaluation: Nursing ok'dOT pt agreeable participated in ue ex w/ 1# wt, sit<>stand cga toilet transfer chair<>bsc cga w/ vc's, toilet cga using r/w and bsc   cont poc

## 2022-07-06 NOTE — PROGRESS NOTES
"[facility]  [address] [place] [address]. [address]   - [phone]  F - [phone]     Progress Note          PATIENT  DEMOGRAPHICS   PATIENT NAME: Yamileth Slater                      PHYSICIAN: Ace Lauren MD  : 1960  MRN: [id number]   LOS: 11 days    Patient Care Team:  Rianna Macias MD as PCP - General (Family Medicine)  Subjective   SUBJECTIVE   Doing better with therapy and now plan for discharge today         Objective   OBJECTIVE   Vital Signs  Temp:  [96.9 °F (36.1 °C)-97.3 °F (36.3 °C)] 97.2 °F (36.2 °C)  Heart Rate:  [64-81] 73  Resp:  [18] 18  BP: (117-149)/(55-76) 149/76    Flowsheet Rows      Flowsheet Row First Filed Value   Admission Height 157.5 cm (62\") Documented at 2022 1743   Admission Weight 135 kg (297 lb) Documented at 2022 1743             I/O last 3 completed shifts:  In: 960 [P.O.:660; IV Piggyback:300]  Out: -     PHYSICAL EXAM    Physical Exam  Constitutional:       Appearance: She is well-developed. She is ill-appearing.   HENT:      Head: Normocephalic.   Eyes:      Pupils: Pupils are equal, round, and reactive to light.   Cardiovascular:      Rate and Rhythm: Normal rate and regular rhythm.      Heart sounds: Normal heart sounds.   Pulmonary:      Effort: Pulmonary effort is normal.      Breath sounds: Normal breath sounds.   Abdominal:      General: Bowel sounds are normal.      Palpations: Abdomen is soft.   Musculoskeletal:         General: No swelling.   Skin:     Coloration: Skin is not jaundiced.   Neurological:      Deep Tendon Reflexes: Reflexes normal.         RESULTS   Results Review:    Results from last 7 days   Lab Units 22  0551 22  0601 22  0646   SODIUM mmol/L 132* 132* 128*   POTASSIUM mmol/L 4.6 3.5 4.2   CHLORIDE mmol/L 93* 92* 89*   CO2 mmol/L 24.0 27.0 23.0   BUN mg/dL 40* 30* 60*   CREATININE mg/dL 4.99* 3.69* 5.65*   CALCIUM mg/dL 9.2 9.7 9.2   BILIRUBIN mg/dL <0.2 0.2 0.2   ALK PHOS " U/L 220* 155* 198*   ALT (SGPT) U/L 5 <5 <5   AST (SGOT) U/L 14 10 10   GLUCOSE mg/dL 319* 161* 383*       Estimated Creatinine Clearance: 14.4 mL/min (A) (by C-G formula based on SCr of 4.99 mg/dL (H)).    Results from last 7 days   Lab Units 07/03/22  0445 07/02/22  0334 07/01/22  0558   MAGNESIUM mg/dL 2.6* 2.6* 1.9             Results from last 7 days   Lab Units 07/06/22  0551 07/05/22  0601 07/04/22  0646 07/03/22  0445 07/02/22  0334   WBC 10*3/mm3 8.99 8.98 8.81 10.15 11.53*   HEMOGLOBIN g/dL 10.0* 9.8* 9.7* 9.6* 9.4*   PLATELETS 10*3/mm3 262 276 302 337 259               Imaging Results (Last 24 Hours)     ** No results found for the last 24 hours. **           MEDICATIONS    bumetanide, 2 mg, Oral, Once per day on Sun Tue Thu Sat  epoetin hubert/hubert-epbx, 6,000 Units, Intravenous, Once per day on Mon Wed Fri  fluconazole, 200 mg, Oral, Once per day on Mon Wed Fri   And  fluconazole, 100 mg, Oral, Once per day on Sun Tue Thu Sat  gabapentin, 300 mg, Nasogastric, Daily  guaiFENesin, 1,200 mg, Oral, Q12H  Insulin Aspart, 0-9 Units, Subcutaneous, TID AC  Insulin Aspart, 10 Units, Subcutaneous, 4x Daily  insulin detemir, 25 Units, Subcutaneous, Q12H  isosorbide mononitrate, 30 mg, Oral, Q24H  levothyroxine, 25 mcg, Oral, Q AM  linezolid, 600 mg, Oral, Q12H  lisinopril, 20 mg, Oral, Once per day on Sun Tue Thu Sat  metoprolol tartrate, 25 mg, Oral, Q12H  nystatin, , Topical, Q12H  pantoprazole, 40 mg, Intravenous, Q24H  senna-docusate sodium, 2 tablet, Oral, BID  sodium chloride, 10 mL, Intravenous, Q12H  sodium chloride, 10 mL, Intravenous, Q12H  sodium chloride, 10 mL, Intravenous, Q12H      potassium chloride, 10 mEq, Last Rate: Stopped (06/26/22 0130)  potassium chloride, 10 mEq, Last Rate: Stopped (06/26/22 0212)        Assessment & Plan   ASSESSMENT / PLAN      Ventilator associated pneumonia (HCC)    Hypertension    COPD (chronic obstructive pulmonary disease) (HCC)    Coronary artery disease involving  native coronary artery of native heart without angina pectoris    Hypothyroidism    Acute cystitis with hematuria    Type 2 diabetes mellitus with autonomic neuropathy (HCC)    ESRD on hemodialysis (HCC)    Accidental drug overdose    Acute respiratory failure with hypoxia and hypercapnia (HCC)    Anemia    Positive fecal occult blood test    Yeast UTI    1.ESRD dependent on hemodialysis since October 2021.  Has tunnel catheter. Patient is on dialysis MWF at [facility]. Lost weight from 297 to 265 lbs and now on bumex po non dialysis days.     2. Anemia chronic kidney disease.  Patient had extensive work-up in the past by Dr. Munoz. She also has B12 deficiency.  She has history of AVM.  She received 2 units of packed RBCs. Now on epogen 6000 units. hgb between 9-10     3. htn - runs low with hd. Lisinopril on non dialysis days. Bp is stable     4. Possible PNA/Chronic Resp Failure related to COPD-now extubated 7/1/22. She is MRSA screen positive.Patient is on home oxygen and trilogy when sleeping.  On zyvox. On pureed diet and may remove NG tube today      5. Possible OD with flexeril- rec'd activated charcoal per g tube     6. DM- Glucose was 605 on arrival- management per primary team.      7.coronary artery disease     8.chronic kidney disease/mineral and bone disorder on sevelamer    Plan for possible discharge today after dialysis                 This document has been electronically signed by Ace Lauren MD on July 6, 2022 09:59 CDT

## 2022-07-06 NOTE — PLAN OF CARE
Goal Outcome Evaluation:  Plan of Care Reviewed With: patient        Progress: improving  Outcome Evaluation: Pt. sitting up in recliner agreeable to mobility. Pt. transferred sit-stand CGA, amb. 44ft wtih RW CGA slow nasir/forward flexed posture/short step length, pt. performed x15 reps seated therex after gt. Pt. reports may d/c home tomorrow with spouse

## 2022-07-06 NOTE — THERAPY TREATMENT NOTE
Acute Care - Physical Therapy Treatment Note  [facility]     Patient Name: Yamileth Slater  : 1960  MRN: [id number]  Today's Date: 2022      Visit Dx:     ICD-10-CM ICD-9-CM   1. Altered mental status, unspecified altered mental status type  R41.82 780.97   2. Acute respiratory failure, unspecified whether with hypoxia or hypercapnia ([facility])  J96.00 518.81   3. Acute pulmonary edema ([facility])  J81.0 518.4   4. ESRD (end stage renal disease) ([facility])  N18.6 585.6   5. Hyperglycemia  R73.9 790.29   6. Pharyngeal dysphagia  R13.13 787.23   7. Impaired functional mobility, balance, gait, and endurance  Z74.09 V49.89   8. Impaired mobility and activities of daily living  Z74.09 V49.89    Z78.9    9. Physical deconditioning  R53.81 799.3   10. Acute respiratory failure with hypoxia and hypercapnia ([facility])  J96.01 518.81    J96.02      Patient Active Problem List   Diagnosis   • Chronic midline low back pain without sciatica   • Vitamin D deficiency   • Tobacco dependence syndrome   • Shoulder joint pain   • Peripheral vascular disease ([facility])   • Morbid obesity with BMI of 50.0-59.9, adult ([facility])   • Mixed hyperlipidemia   • Kidney stone   • History of colon polyps   • GERD (gastroesophageal reflux disease)   • Excoriated eczema   • Hypertension   • COPD (chronic obstructive pulmonary disease) ([facility])   • Chronic folliculitis   • Coronary artery disease involving native coronary artery of native heart without angina pectoris   • 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   • Hypothyroidism   • Carotid artery disease ([facility])   • [place] abuse   • PAD (peripheral artery disease) ([facility])   • Venous insufficiency   • LAFB (left anterior fascicular block)   • Pulmonary hypertension ([facility])   • Left hip pain   • Neck pain   • MIKE and COPD overlap syndrome ([facility])   • Pneumonia due to COVID-19 virus   • Hyperkalemia   • Acute cystitis with hematuria   • Cutaneous candidiasis   • Community acquired pneumonia of right  middle lobe of lung   • MRSA infection   • Esophageal dysphagia   • Monilial esophagitis ([facility])   • NSTEMI, initial episode of care ([facility])   • Cellulitis of left leg   • Urinary tract infection due to Proteus   • History of insertion of tunneled central venous catheter (CVC) with port   • Anemia due to chronic kidney disease, on chronic dialysis ([facility])   • Pneumonitis   • Personal history of noncompliance with medical treatment, presenting hazards to health   • Type 2 diabetes mellitus with autonomic neuropathy ([facility])   • Physical deconditioning   • ESRD on hemodialysis ([facility])   • DVT prophylaxis   • Accidental drug overdose   • Acute respiratory failure with hypoxia and hypercapnia ([facility])   • Anemia   • Ventilator associated pneumonia ([facility])   • Positive fecal occult blood test   • Yeast UTI     Past Medical History:   Diagnosis Date   • Acute blood loss anemia 4/16/2017    Likely due to gastric oozing at this time. - Dr. Duarte (GI) was consulted and has now signed off, will follow up outpatient - pill colonoscopy showed AVMs - continue to monitor   • Acute cystitis with hematuria 3/31/2021    1/13: IV Rocephin 1 gm q 24 1/14 : transitioned  to omnicef 300mg. Urine cultures resulted and did not show growth. Omnicef discontinued as patient is asymptomatic   • Altered mental status 1/9/2022    - AMS on presentation - initial ABG pH 7.3, CO2 34 - Procal 0.29 - UA negative for acute cysitits -CTA head wnl  - Empiric Zosyn and Vancomycin -Lactate 2.5 on admission  - blood cultures no growth at 24 hours     • Anxiety    • CAD (coronary artery disease) 4/24/2021    S/P 3 stents 5/1/2021 for [facility] Continue ASA 81mg & Clopidogrel 75mg Continue Atorvastatin 40mg   • Carotid artery stenosis    • Chronic obstructive lung disease (HCC)    • CKD (chronic kidney disease) stage 4, GFR 15-29 ml/min ([facility])    • CKD (chronic kidney disease), symptom management only, stage 5 ([facility]) 10/5/2020    Results from last 7 days Lab Units 12/15/21  0548 12/14/21 1323 12/14/21 0916 CREATININE mg/dL 3.92* 3.21* 3.32*  Baseline creatinine 2-3 GFR 13-25 GFR 15 Dialysis MWF, sees Dr. Lauren Nephrology consult,, appreciate recommendations Continue Bumex 1mg bid daily Holding Bumex 2mg 4 times a week   • Colonic polyp    • Coronary arteriosclerosis    • Diabetes mellitus (HCC)    • Diabetic neuropathy (HCC)    • Ear pain, right 10/18/2021    - canal trauma due to patient scratching and DMT2 - added cortisporin ear drops   • Elevated troponin 10/12/2021    -most likely from CKD -Trending down -Neg chest pain   • Generalized abdominal pain 7/1/2022    Could be due to initiation of tube feeds vs dyspepsia vs abdominal cramps related to no PO intake due to intubation vs constipation Continue current laxative regiment  If no bowel movement by this afternoon will consider enema   • GERD (gastroesophageal reflux disease)    • GI bleed 5/13/2021    - GI will follow up outpatient - Protonix 40mg daily - Avoid medical DVT prophy and use mechanical at this time instead. - Continue to monitor - pill colonoscopy results showed AVMs   • History of transfusion    • Hypercholesterolemia    • Hyperosmolar hyperglycemic state (HHS) ([facility]) 6/25/2022    Serum glucose 605 on admission  Anion gap 16 PH 7.37 Bicarb 27.9 Continue fluids  Insulin drip with [facility] protocol  Anion gap closed around 10 AM, received one dose of Levamir subq, will stop insulin drip after 2 hrs     • Hypertension    • Hypokalemia 5/27/2022    Will replace as needed. Will be cautious in the setting of ESRD to avoid need for emergency dialysis   Monitor Qtc intervals on EKG     • Hypomagnesemia 6/27/2021    Monitor and replace   • Morbid obesity ([facility])    • Nephrolithiasis    • On mechanically assisted ventilation ([facility]) 6/26/2022    Vent management and sedation orders placed.  - [facility] intensivist group consulted for vent management appreciate recommendations  - plan to extubate today     • Peripheral vascular  disease ([facility])    • Pleural effusion on right 6/26/2022    CXR on 6/30/22 read as a small upper left pulmonary edema vs early pneumonia.  Last Echo 1/2022 EF 61-65 % Continue to monitor  Procal slightly improved, CRP improved On Linezolid and meropenum      • SIRS (systemic inflammatory response syndrome) ([facility]) 1/9/2022    Admission  - WBC 17.78   -   - RR 16 - 1/10: VSS/wnl - CXR - Mild pulm edema - Blood cultures no growth at 24 hours  - Procalcitonin 0.29 - UA : glucose 1000, negative Leucocytes/nitrate - Empiric Zosyn and Vancomcyin    • Sleep apnea    • Substance abuse ([facility])    • Vitamin D deficiency      Past Surgical History:   Procedure Laterality Date   • CARDIAC CATHETERIZATION N/A 7/14/2020   • CARDIAC CATHETERIZATION N/A 4/23/2021    Procedure: Left Heart Cath;  Surgeon: Melba Romo MD;  Location: [facility] CATH INVASIVE LOCATION;  Service: Cardiology;  Laterality: N/A;   • CARDIAC CATHETERIZATION N/A 4/30/2021    Procedure: Percutaneous Coronary Intervention;  Surgeon: Russell Voss MD;  Location: [facility] CATH INVASIVE LOCATION;  Service: Cardiovascular;  Laterality: N/A;   • CARDIAC CATHETERIZATION N/A 4/30/2021    Procedure: Stent NIKKI coronary;  Surgeon: Russell Voss MD;  Location: [facility] CATH INVASIVE LOCATION;  Service: Cardiovascular;  Laterality: N/A;   • CARDIAC CATHETERIZATION Left 11/13/2021    Procedure: Left Heart Cath;  Surgeon: Niall Rios MD;  Location: [facility] CATH INVASIVE LOCATION;  Service: Cardiology;  Laterality: Left;   • CAROTID STENT Left    • COLONOSCOPY     • COLONOSCOPY N/A 5/14/2021    Procedure: COLONOSCOPY;  Surgeon: Mingo Duarte MD;  Location: [facility] ENDOSCOPY;  Service: Gastroenterology;  Laterality: N/A;   • CORONARY ARTERY BYPASS GRAFT N/A 2013    CABG X 3   • CYSTOSCOPY BLADDER STONE LITHOTRIPSY Bilateral    • ENDOSCOPY N/A 4/12/2021    Procedure: ESOPHAGOGASTRODUODENOSCOPY;  Surgeon: Mingo Duarte MD;  Location: [facility]  ENDOSCOPY;  Service: Gastroenterology;  Laterality: N/A;   • ENDOSCOPY N/A 5/14/2021    Procedure: ESOPHAGOGASTRODUODENOSCOPY;  Surgeon: Mingo Duarte MD;  Location: [facility] ENDOSCOPY;  Service: Gastroenterology;  Laterality: N/A;   • ENDOSCOPY N/A 1/28/2022    Procedure: ESOPHAGOGASTRODUODENOSCOPY;  Surgeon: Mingo Duarte MD;  Location: [facility] ENDOSCOPY;  Service: Gastroenterology;  Laterality: N/A;   • INTERVENTIONAL RADIOLOGY PROCEDURE N/A 10/21/2021    Procedure: tunneled central venous catheter placement;  Surgeon: Donnie Robles MD;  Location: [facility] ANGIO INVASIVE LOCATION;  Service: Interventional Radiology;  Laterality: N/A;   • INTERVENTIONAL RADIOLOGY PROCEDURE N/A 1/27/2022    Procedure: tunneled central venous catheter placement;  Surgeon: Donnie Robles MD;  Location: [facility] ANGIO INVASIVE LOCATION;  Service: Interventional Radiology;  Laterality: N/A;     PT Assessment (last 12 hours)     PT Evaluation and Treatment     Row Name 07/06/22 1525          Physical Therapy Time and Intention    Subjective Information no complaints  -RONNY     Document Type therapy note (daily note)  -RONNY     Mode of Treatment individual therapy;physical therapy  -RONNY     Patient Effort good  -RONNY     Comment Pt. sitting up in recliner  -RONNY     Row Name 07/06/22 1525          Pain    Pretreatment Pain Rating 0/10 - no pain  -RONNY     Posttreatment Pain Rating 0/10 - no pain  -RONNY     Row Name 07/06/22 1525          Cognition    Orientation Status (Cognition) oriented to;person  -     Row Name 07/06/22 1525          Bed Mobility    Bed Mobility sit-supine  -RONNY     Supine-Sit [place] (Bed Mobility) --  -RONNY     Sit-Supine [place] (Bed Mobility) standby assist  -RONNY     Assistive Device (Bed Mobility) --  -RONNY     Comment, (Bed Mobility) HOB down no bedrail  -RONNY     Row Name 07/06/22 1525          Transfers    Transfers sit-stand transfer;stand-sit transfer;chair-bed transfer  -RONNY     Bed-Chair  [place] (Transfers) --  -     Assistive Device (Bed-Chair Transfers) --  -     Sit-Stand [place] (Transfers) contact guard  -     Stand-Sit [place] (Transfers) contact guard  -     Row Name 07/06/22 1525          Sit-Stand Transfer    Assistive Device (Sit-Stand Transfers) walker, front-wheeled  -     Row Name 07/06/22 1525          Stand-Sit Transfer    Assistive Device (Stand-Sit Transfers) walker, front-wheeled  -     Row Name 07/06/22 1525          Gait/Stairs (Locomotion)    [place] Level (Gait) contact guard  -     Assistive Device (Gait) walker, front-wheeled  -     Distance in Feet (Gait) 44  -     Pattern (Gait) step-to  -     Deviations/Abnormal Patterns (Gait) gait speed decreased;stride length decreased  -     Bilateral Gait Deviations forward flexed posture  -     Row Name 07/06/22 1525          Motor Skills    Therapeutic Exercise hip;knee;ankle  -[facility] Name 07/06/22 1525          Hip (Therapeutic Exercise)    Hip (Therapeutic Exercise) AROM (active range of motion);isometric exercises  -     Hip AROM (Therapeutic Exercise) bilateral;flexion  -     Hip Isometrics (Therapeutic Exercise) gluteal sets  -[facility] Name 07/06/22 1525          Knee (Therapeutic Exercise)    Knee (Therapeutic Exercise) AROM (active range of motion)  -     Knee AROM (Therapeutic Exercise) bilateral;LAQ (long arc quad)  -[facility] Name 07/06/22 1525          Ankle (Therapeutic Exercise)    Ankle (Therapeutic Exercise) AROM (active range of motion)  -     Ankle AROM (Therapeutic Exercise) bilateral;dorsiflexion;plantarflexion  -JA     Row Name             Wound 06/25/22 2100 Left lower leg    Wound - Properties Group Placement Date: 06/25/22  - Placement Time: 2100 -KH Present on Hospital Admission: Y  -KH Side: Left  -KH Orientation: lower  - Location: leg  -KH     Retired Wound - Properties Group Placement Date: 06/25/22  - Placement Time: 2100 -KH Present on  Hospital Admission: Y  -KH Side: Left  -KH Orientation: lower  -KH Location: leg  -KH     Retired Wound - Properties Group Date first assessed: 06/25/22 -KH Time first assessed: 2100 -KH Present on Hospital Admission: Y  -KH Side: Left  -KH Location: leg  -KH     Row Name             Wound 07/01/22 2000 Left anterior groin MASD (Moisture associated skin damage)    Wound - Properties Group Placement Date: 07/01/22 -LS Placement Time: 2000 -LS Side: Left  -LS Orientation: anterior  -LS Location: groin  -LS Primary Wound Type: MASD  -LS     Retired Wound - Properties Group Placement Date: 07/01/22 -LS Placement Time: 2000 -LS Side: Left  -LS Orientation: anterior  -LS Location: groin  -LS Primary Wound Type: MASD  -LS     Retired Wound - Properties Group Date first assessed: 07/01/22 -LS Time first assessed: 2000 -LS Side: Left  -LS Location: groin  -LS Primary Wound Type: MASD  -LS     Row Name             Wound 07/01/22 2000 Right anterior groin MASD (Moisture associated skin damage)    Wound - Properties Group Placement Date: 07/01/22 -LS Placement Time: 2000 -LS Side: Right  -LS Orientation: anterior  -LS Location: groin  -LS Primary Wound Type: MASD  -LS     Retired Wound - Properties Group Placement Date: 07/01/22 -LS Placement Time: 2000 -LS Side: Right  -LS Orientation: anterior  -LS Location: groin  -LS Primary Wound Type: MASD  -LS     Retired Wound - Properties Group Date first assessed: 07/01/22 -LS Time first assessed: 2000 -LS Side: Right  -LS Location: groin  -LS Primary Wound Type: MASD  -LS     Row Name             Wound Left heel Other (comment)    Wound - Properties Group Side: Left  -AK Location: heel  -AK Primary Wound Type: Other  -AK     Retired Wound - Properties Group Side: Left  -AK Location: heel  -AK Primary Wound Type: Other  -AK     Retired Wound - Properties Group Side: Left  -AK Location: heel  -AK Primary Wound Type: Other  -AK     Row Name 07/06/22 1525          Vital  Signs    Pre Systolic BP Rehab 127  -     Pre Treatment Diastolic BP 57  -     Pretreatment Heart Rate (beats/min) 71  -     Pre SpO2 (%) 100  -     O2 Delivery Pre Treatment supplemental O2  -     Pre Patient Position Sitting  -     Intra Patient Position Standing  -     Post Patient Position Supine  -[facility] Name 07/06/22 1525          Positioning and Restraints    Pre-Treatment Position sitting in chair/recliner  -     Post Treatment Position bed  -     In Bed side lying right;call light within reach;encouraged to call for assist;exit alarm on  -[facility] Name 07/06/22 1525          Progress Summary (PT)    Progress Toward Functional Goals (PT) progress toward functional goals as expected  -[facility] Name 07/06/22 1525          Bed Mobility Goal 1 (PT)    Activity/Assistive Device (Bed Mobility Goal 1, PT) bed mobility activities, all  -     [place] Level/Cues Needed (Bed Mobility Goal 1, PT) modified independence  -     Time Frame (Bed Mobility Goal 1, PT) 10 days  -     Progress/Outcomes (Bed Mobility Goal 1, PT) goal not met  -[facility] Name 07/06/22 1525          Transfer Goal 1 (PT)    Activity/Assistive Device (Transfer Goal 1, PT) bed-to-chair/chair-to-bed;toilet  -     [place] Level/Cues Needed (Transfer Goal 1, PT) modified independence  -     Time Frame (Transfer Goal 1, PT) 2 weeks;3 weeks  -     Progress/Outcome (Transfer Goal 1, PT) goal not met  -[facility] Name 07/06/22 1525          Gait Training Goal 1 (PT)    Activity/Assistive Device (Gait Training Goal 1, PT) gait (walking locomotion);assistive device use  -     [place] Level (Gait Training Goal 1, PT) modified independence  -     Distance (Gait Training Goal 1, PT) 70 ft per trip or more  -     Time Frame (Gait Training Goal 1, PT) 3 weeks  -     Progress/Outcome (Gait Training Goal 1, PT) goal not met  -           User Key  (r) = Recorded By, (t) = Taken By, (c) = Cosigned By     Initials Name Provider Type    Aminata Navas, RN Registered Nurse    Perez Nowak, PTA Physical Therapist Assistant    Caroline Pandey, RN Registered Nurse    Isatu Odonnell, RN Registered Nurse                Physical Therapy Education                 Title: PT OT SLP Therapies (In Progress)     Topic: Physical Therapy (In Progress)     Point: Mobility training (In Progress)     Learning Progress Summary           Patient Acceptance, E, NR by  at 7/2/2022 1623    Comment: POC                   Point: Home exercise program (In Progress)     Learning Progress Summary           Patient Acceptance, E, NR by  at 7/2/2022 1623    Comment: POC                   Point: Body mechanics (In Progress)     Learning Progress Summary           Patient Acceptance, E, NR by  at 7/2/2022 1623    Comment: POC                   Point: Precautions (In Progress)     Learning Progress Summary           Patient Acceptance, E, NR by  at 7/2/2022 1623    Comment: POC                               User Key     Initials Effective Dates Name Provider Type Discipline     06/16/21 -  Gauri Anderson E, PT Physical Therapist PT              PT Recommendation and Plan  Anticipated Discharge Disposition (PT): skilled nursing facility  Progress Summary (PT)  Progress Toward Functional Goals (PT): progress toward functional goals as expected  Plan of Care Reviewed With: patient  Progress: improving  Outcome Evaluation: Pt. sitting up in recliner agreeable to mobility. Pt. transferred sit-stand CGA, amb. 44ft wtih RW CGA slow nasir/forward flexed posture/short step length, pt. performed x15 reps seated therex after gt. Pt. reports may d/c home tomorrow with spouse       Time Calculation:    PT Charges     Row Name 07/06/22 1605             Time Calculation    Start Time 1525  -JA      Stop Time 1555  -JA      Time Calculation (min) 30 min  -JA              Time Calculation- PT    Total Timed Code Minutes- PT  30 minute(s)  -JA              Timed Charges    82939 - PT Therapeutic Exercise Minutes 15  -JA      34714 - Gait Training Minutes  15  -JA              Total Minutes    Timed Charges Total Minutes 30  -JA       Total Minutes 30  -JA            User Key  (r) = Recorded By, (t) = Taken By, (c) = Cosigned By    Initials Name Provider Type    Perez Nowak PTA Physical Therapist Assistant              Therapy Charges for Today     Code Description Service Date Service Provider Modifiers Qty    03636092253 HC PT THER PROC EA 15 MIN 7/5/2022 Perez Lutz, PTA GP 1    53642073195 HC PT THERAPEUTIC ACT EA 15 MIN 7/5/2022 Perez Lutz, PTA GP 2    55233548081 HC PT THER PROC EA 15 MIN 7/6/2022 Perez Lutz, PTA GP 1    69765754913 HC GAIT TRAINING EA 15 MIN 7/6/2022 Perez Lutz, PTA GP 1          PT G-Codes  Outcome Measure Options: AM-PAC 6 Clicks Daily Activity (OT)  AM-PAC 6 Clicks Score (PT): 7  AM-PAC 6 Clicks Score (OT): 16    Perez Lutz PTA  7/6/2022

## 2022-07-07 ENCOUNTER — READMISSION MANAGEMENT (OUTPATIENT)
Dept: CALL CENTER | Facility: HOSPITAL | Age: 62
End: 2022-07-07

## 2022-07-07 ENCOUNTER — HOME HEALTH ADMISSION (OUTPATIENT)
Dept: HOME HEALTH SERVICES | Facility: HOME HEALTHCARE | Age: 62
End: 2022-07-07

## 2022-07-07 VITALS
TEMPERATURE: 97.2 F | HEART RATE: 61 BPM | WEIGHT: 264.6 LBS | HEIGHT: 62 IN | BODY MASS INDEX: 48.69 KG/M2 | DIASTOLIC BLOOD PRESSURE: 44 MMHG | RESPIRATION RATE: 18 BRPM | OXYGEN SATURATION: 94 % | SYSTOLIC BLOOD PRESSURE: 101 MMHG

## 2022-07-07 PROBLEM — N30.01 ACUTE CYSTITIS WITH HEMATURIA: Status: RESOLVED | Noted: 2021-03-31 | Resolved: 2022-07-07

## 2022-07-07 PROBLEM — J96.02 ACUTE RESPIRATORY FAILURE WITH HYPOXIA AND HYPERCAPNIA: Status: RESOLVED | Noted: 2022-06-26 | Resolved: 2022-07-07

## 2022-07-07 PROBLEM — T50.901A ACCIDENTAL DRUG OVERDOSE: Status: RESOLVED | Noted: 2022-06-26 | Resolved: 2022-07-07

## 2022-07-07 PROBLEM — J96.01 ACUTE RESPIRATORY FAILURE WITH HYPOXIA AND HYPERCAPNIA (HCC): Status: RESOLVED | Noted: 2022-06-26 | Resolved: 2022-07-07

## 2022-07-07 LAB
ALBUMIN SERPL-MCNC: 3.4 G/DL (ref 3.5–5.2)
ALBUMIN/GLOB SERPL: 0.7 G/DL
ALP SERPL-CCNC: 162 U/L (ref 39–117)
ALT SERPL W P-5'-P-CCNC: 6 U/L (ref 1–33)
ANION GAP SERPL CALCULATED.3IONS-SCNC: 16 MMOL/L (ref 5–15)
AST SERPL-CCNC: 13 U/L (ref 1–32)
BASOPHILS # BLD AUTO: 0.1 10*3/MM3 (ref 0–0.2)
BASOPHILS NFR BLD AUTO: 0.9 % (ref 0–1.5)
BILIRUB SERPL-MCNC: 0.3 MG/DL (ref 0–1.2)
BUN SERPL-MCNC: 26 MG/DL (ref 8–23)
BUN/CREAT SERPL: 6.8 (ref 7–25)
CALCIUM SPEC-SCNC: 9.5 MG/DL (ref 8.6–10.5)
CHLORIDE SERPL-SCNC: 92 MMOL/L (ref 98–107)
CO2 SERPL-SCNC: 23 MMOL/L (ref 22–29)
CREAT SERPL-MCNC: 3.81 MG/DL (ref 0.57–1)
DEPRECATED RDW RBC AUTO: 48.9 FL (ref 37–54)
EGFRCR SERPLBLD CKD-EPI 2021: 12.7 ML/MIN/1.73
EOSINOPHIL # BLD AUTO: 0.23 10*3/MM3 (ref 0–0.4)
EOSINOPHIL NFR BLD AUTO: 2.2 % (ref 0.3–6.2)
ERYTHROCYTE [DISTWIDTH] IN BLOOD BY AUTOMATED COUNT: 16.8 % (ref 12.3–15.4)
GLOBULIN UR ELPH-MCNC: 4.9 GM/DL
GLUCOSE BLDC GLUCOMTR-MCNC: 314 MG/DL (ref 70–130)
GLUCOSE BLDC GLUCOMTR-MCNC: 93 MG/DL (ref 70–130)
GLUCOSE SERPL-MCNC: 115 MG/DL (ref 65–99)
HCT VFR BLD AUTO: 33.4 % (ref 34–46.6)
HGB BLD-MCNC: 10.8 G/DL (ref 12–15.9)
IMM GRANULOCYTES # BLD AUTO: 0.11 10*3/MM3 (ref 0–0.05)
IMM GRANULOCYTES NFR BLD AUTO: 1 % (ref 0–0.5)
LYMPHOCYTES # BLD AUTO: 2.26 10*3/MM3 (ref 0.7–3.1)
LYMPHOCYTES NFR BLD AUTO: 21.4 % (ref 19.6–45.3)
MCH RBC QN AUTO: 26.9 PG (ref 26.6–33)
MCHC RBC AUTO-ENTMCNC: 32.3 G/DL (ref 31.5–35.7)
MCV RBC AUTO: 83.1 FL (ref 79–97)
MONOCYTES # BLD AUTO: 0.85 10*3/MM3 (ref 0.1–0.9)
MONOCYTES NFR BLD AUTO: 8.1 % (ref 5–12)
NEUTROPHILS NFR BLD AUTO: 6.99 10*3/MM3 (ref 1.7–7)
NEUTROPHILS NFR BLD AUTO: 66.4 % (ref 42.7–76)
NRBC BLD AUTO-RTO: 0 /100 WBC (ref 0–0.2)
PLATELET # BLD AUTO: 245 10*3/MM3 (ref 140–450)
PMV BLD AUTO: 8.2 FL (ref 6–12)
POTASSIUM SERPL-SCNC: 4.3 MMOL/L (ref 3.5–5.2)
PROT SERPL-MCNC: 8.3 G/DL (ref 6–8.5)
RBC # BLD AUTO: 4.02 10*6/MM3 (ref 3.77–5.28)
SODIUM SERPL-SCNC: 131 MMOL/L (ref 136–145)
WBC NRBC COR # BLD: 10.54 10*3/MM3 (ref 3.4–10.8)

## 2022-07-07 PROCEDURE — 97535 SELF CARE MNGMENT TRAINING: CPT

## 2022-07-07 PROCEDURE — 99239 HOSP IP/OBS DSCHRG MGMT >30: CPT | Performed by: STUDENT IN AN ORGANIZED HEALTH CARE EDUCATION/TRAINING PROGRAM

## 2022-07-07 PROCEDURE — 63710000001 INSULIN ASPART PER 5 UNITS: Performed by: STUDENT IN AN ORGANIZED HEALTH CARE EDUCATION/TRAINING PROGRAM

## 2022-07-07 PROCEDURE — 99232 SBSQ HOSP IP/OBS MODERATE 35: CPT | Performed by: INTERNAL MEDICINE

## 2022-07-07 PROCEDURE — 97110 THERAPEUTIC EXERCISES: CPT

## 2022-07-07 PROCEDURE — 82962 GLUCOSE BLOOD TEST: CPT

## 2022-07-07 PROCEDURE — 63710000001 INSULIN DETEMIR PER 5 UNITS: Performed by: STUDENT IN AN ORGANIZED HEALTH CARE EDUCATION/TRAINING PROGRAM

## 2022-07-07 PROCEDURE — 80053 COMPREHEN METABOLIC PANEL: CPT | Performed by: STUDENT IN AN ORGANIZED HEALTH CARE EDUCATION/TRAINING PROGRAM

## 2022-07-07 PROCEDURE — 85025 COMPLETE CBC W/AUTO DIFF WBC: CPT | Performed by: STUDENT IN AN ORGANIZED HEALTH CARE EDUCATION/TRAINING PROGRAM

## 2022-07-07 RX ORDER — FLUCONAZOLE 100 MG/1
100 TABLET ORAL EVERY OTHER DAY
Qty: 7 TABLET | Refills: 0 | Status: SHIPPED | OUTPATIENT
Start: 2022-07-07 | End: 2022-07-19

## 2022-07-07 RX ORDER — LINEZOLID 600 MG/1
600 TABLET, FILM COATED ORAL EVERY 12 HOURS SCHEDULED
Qty: 3 TABLET | Refills: 0 | Status: SHIPPED | OUTPATIENT
Start: 2022-07-07 | End: 2022-07-09

## 2022-07-07 RX ORDER — ALBUMIN (HUMAN) 12.5 G/50ML
25 SOLUTION INTRAVENOUS AS NEEDED
Status: CANCELLED | OUTPATIENT
Start: 2022-07-07 | End: 2022-07-08

## 2022-07-07 RX ORDER — HEPARIN SODIUM 1000 [USP'U]/ML
4000 INJECTION, SOLUTION INTRAVENOUS; SUBCUTANEOUS AS NEEDED
Status: CANCELLED | OUTPATIENT
Start: 2022-07-07

## 2022-07-07 RX ORDER — FLUCONAZOLE 200 MG/1
200 TABLET ORAL EVERY OTHER DAY
Qty: 5 TABLET | Refills: 0 | Status: SHIPPED | OUTPATIENT
Start: 2022-07-08 | End: 2022-07-19

## 2022-07-07 RX ADMIN — BUMETANIDE 2 MG: 1 TABLET ORAL at 08:35

## 2022-07-07 RX ADMIN — PANTOPRAZOLE SODIUM 40 MG: 40 INJECTION, POWDER, FOR SOLUTION INTRAVENOUS at 08:36

## 2022-07-07 RX ADMIN — METOPROLOL TARTRATE 25 MG: 25 TABLET, FILM COATED ORAL at 08:35

## 2022-07-07 RX ADMIN — Medication 10 ML: at 08:38

## 2022-07-07 RX ADMIN — ISOSORBIDE MONONITRATE 30 MG: 30 TABLET, EXTENDED RELEASE ORAL at 08:35

## 2022-07-07 RX ADMIN — GUAIFENESIN 1200 MG: 600 TABLET, EXTENDED RELEASE ORAL at 08:35

## 2022-07-07 RX ADMIN — INSULIN ASPART 7 UNITS: 100 INJECTION, SOLUTION INTRAVENOUS; SUBCUTANEOUS at 11:05

## 2022-07-07 RX ADMIN — INSULIN ASPART 10 UNITS: 100 INJECTION, SOLUTION INTRAVENOUS; SUBCUTANEOUS at 11:05

## 2022-07-07 RX ADMIN — INSULIN DETEMIR 25 UNITS: 100 INJECTION, SOLUTION SUBCUTANEOUS at 10:39

## 2022-07-07 RX ADMIN — NYSTATIN: 100000 POWDER TOPICAL at 08:35

## 2022-07-07 RX ADMIN — LEVOTHYROXINE SODIUM 25 MCG: 25 TABLET ORAL at 05:49

## 2022-07-07 RX ADMIN — LISINOPRIL 20 MG: 20 TABLET ORAL at 08:35

## 2022-07-07 RX ADMIN — FLUCONAZOLE 100 MG: 100 TABLET ORAL at 08:35

## 2022-07-07 RX ADMIN — GABAPENTIN 300 MG: 300 CAPSULE ORAL at 08:35

## 2022-07-07 RX ADMIN — Medication 10 ML: at 08:35

## 2022-07-07 RX ADMIN — LINEZOLID 600 MG: 600 TABLET, FILM COATED ORAL at 08:38

## 2022-07-07 NOTE — PROGRESS NOTES
FAMILY MEDICINE DAILY PROGRESS NOTE  NAME: Yamileth Slater  : 1960  MRN: [id number]     LOS: 12 days     PROVIDER OF SERVICE: Jerman Coffman MD    Chief Complaint: Ventilator associated pneumonia (HCC)    Subjective:     Interval History:  History taken from: patient chart RN    Patient with no complaints this morning.  Yesterday she worked with PT and OT was able to walk with a walker around her room.  Patient denied referral to multiple nursing homes.  She only wanted to go to [facility] and rehab and her insurance would not pay for that.  She had dialysis yesterday which she tolerated fairly well.  She had 1 episode of confusion overnight which has since resolved in the morning.  Patient alert and oriented x3.  No chest pain, shortness of breath.  Sitting comfortably in the chair this morning.    Review of Systems:   Review of Systems   Constitutional: Negative for chills, fatigue and fever.   HENT: Negative for congestion, postnasal drip and rhinorrhea.    Eyes: Negative for pain and visual disturbance.   Respiratory: Negative for cough and shortness of breath.    Cardiovascular: Negative for chest pain, palpitations and leg swelling.   Gastrointestinal: Negative for abdominal pain, diarrhea, nausea and vomiting.   Genitourinary: Negative for difficulty urinating, dysuria and hematuria.   Musculoskeletal: Negative for arthralgias and back pain.   Neurological: Negative for dizziness, syncope, weakness, light-headedness and headaches.       Objective:     Vital Signs  Temp:  [96.8 °F (36 °C)-97.5 °F (36.4 °C)] 96.8 °F (36 °C)  Heart Rate:  [65-76] 66  Resp:  [18] 18  BP: (100-154)/(50-70) 119/54    Physical Exam  Physical Exam  Vitals and nursing note reviewed.   Constitutional:       Appearance: She is well-developed. She is obese. She is not ill-appearing or diaphoretic.   HENT:      Head: Normocephalic and atraumatic.   Eyes:      General: No scleral icterus.     Conjunctiva/sclera:  Conjunctivae normal.   Cardiovascular:      Rate and Rhythm: Normal rate and regular rhythm.      Heart sounds: No murmur heard.  Pulmonary:      Effort: Pulmonary effort is normal. No respiratory distress.      Breath sounds: Normal breath sounds. No wheezing or rhonchi.   Abdominal:      General: Bowel sounds are normal. There is no distension.      Palpations: Abdomen is soft.      Tenderness: There is no abdominal tenderness. Negative signs include Frazier's sign.   Skin:     General: Skin is warm and dry.      Capillary Refill: Capillary refill takes less than 2 seconds.   Neurological:      General: No focal deficit present.      Mental Status: She is alert and oriented to person, place, and time. Mental status is at baseline.   Psychiatric:         Behavior: Behavior normal.         Medication Review    Current Facility-Administered Medications:   •  acetaminophen (TYLENOL) tablet 500 mg, 500 mg, Oral, Q6H PRN, Perez Hooker MD, 500 mg at 07/03/22 1613  •  sennosides-docusate (PERICOLACE) 8.6-50 MG per tablet 2 tablet, 2 tablet, Oral, BID, 2 tablet at 07/06/22 1406 **AND** polyethylene glycol (MIRALAX) packet 17 g, 17 g, Oral, Daily PRN, 17 g at 07/01/22 1249 **AND** bisacodyl (DULCOLAX) EC tablet 5 mg, 5 mg, Oral, Daily PRN, 5 mg at 06/29/22 1715 **AND** bisacodyl (DULCOLAX) suppository 10 mg, 10 mg, Rectal, Daily PRN, Charlene Castro MD  •  bumetanide (BUMEX) tablet 2 mg, 2 mg, Oral, Once per day on Sun Tue Thu Sat, Ace Lauren MD, 2 mg at 07/05/22 0840  •  dextrose (D50W) (25 g/50 mL) IV injection 10-50 mL, 10-50 mL, Intravenous, Q15 Min PRN, Charlene Castro MD  •  dextrose (D50W) (25 g/50 mL) IV injection 25 g, 25 g, Intravenous, Q15 Min PRN, Jung Leonard MD  •  dextrose (GLUTOSE) oral gel 15 g, 15 g, Oral, Q15 Min PRN, Jung Leonard MD  •  epoetin hubert (EPOGEN,PROCRIT) injection 6,000 Units, 6,000 Units, Intravenous, Once per day on Mon Wed Fri, Ace Lauren MD, 6,000 Units at  07/06/22 1522  •  fluconazole (DIFLUCAN) tablet 200 mg, 200 mg, Oral, Once per day on Mon Wed Fri, 200 mg at 07/06/22 1406 **AND** fluconazole (DIFLUCAN) tablet 100 mg, 100 mg, Oral, Once per day on Sun Tue Thu Sat, Jerman Coffman MD, 100 mg at 07/05/22 0839  •  gabapentin (NEURONTIN) capsule 300 mg, 300 mg, Nasogastric, Daily, Perez Hooker MD, 300 mg at 07/06/22 1406  •  glucagon (human recombinant) (GLUCAGEN DIAGNOSTIC) injection 1 mg, 1 mg, Intramuscular, Q15 Min PRN, Jung Leonard MD  •  guaiFENesin (MUCINEX) 12 hr tablet 1,200 mg, 1,200 mg, Oral, Q12H, Perez Hooker MD, 1,200 mg at 07/06/22 2103  •  guaiFENesin (ROBITUSSIN) 100 MG/5ML oral solution 400 mg, 400 mg, Oral, Q6H PRN, Jerman Coffman MD  •  heparin (porcine) injection 4,000 Units, 4,000 Units, Intracatheter, PRN, Ace Lauren MD, 4,000 Units at 07/06/22 1331  •  heparin (porcine) injection 4,000 Units, 4,000 Units, Intracatheter, PRN, Ace Lauren MD  •  hydrALAZINE (APRESOLINE) injection 10 mg, 10 mg, Intravenous, Q6H PRN, Jung Leonard MD, 10 mg at 07/01/22 0034  •  Insulin Aspart (novoLOG) injection 0-9 Units, 0-9 Units, Subcutaneous, TID AC, Perez Hooker MD, 4 Units at 07/06/22 1654  •  Insulin Aspart (novoLOG) injection 10 Units, 10 Units, Subcutaneous, 4x Daily, Perez Hookre MD, 10 Units at 07/06/22 2104  •  insulin detemir (LEVEMIR) injection 25 Units, 25 Units, Subcutaneous, Q12H, Perez Hooker MD, 25 Units at 07/06/22 2105  •  isosorbide mononitrate (IMDUR) 24 hr tablet 30 mg, 30 mg, Oral, Q24H, Jung Leonard MD, 30 mg at 07/06/22 1406  •  levothyroxine (SYNTHROID, LEVOTHROID) tablet 25 mcg, 25 mcg, Oral, Q AM, Perez Hooker MD, 25 mcg at 07/07/22 0526  •  linezolid (ZYVOX) tablet 600 mg, 600 mg, Oral, Q12H, Jerman Coffman MD, 600 mg at 07/06/22 213  •  lisinopril (PRINIVIL,ZESTRIL) tablet 20 mg, 20 mg, Oral, Once per day on Sun Tue Thu Sat, Jung Leonard MD, 20 mg at 07/05/22  0840  •  magnesium sulfate 4 gram infusion - Mg less than or equal to 1mg/dL, 4 g, Intravenous, PRN **OR** magnesium sulfate 3 gram infusion (1gm x 3) - Mg 1.1 - 1.5 mg/dL, 1 g, Intravenous, PRN **OR** Magnesium Sulfate 2 gram infusion- Mg 1.6 - 1.9 mg/dL, 2 g, Intravenous, PRN, Charlene Castro MD, Last Rate: 25 mL/hr at 22 1617, 2 g at 22 1617  •  metoprolol tartrate (LOPRESSOR) injection 5 mg, 5 mg, Intravenous, Q5 Min PRN, Froylan Lu MD, 5 mg at 22 192  •  metoprolol tartrate (LOPRESSOR) tablet 25 mg, 25 mg, Oral, Q12H, Froylan Lu MD, 25 mg at 22  •  [] morphine injection 1 mg, 1 mg, Intravenous, Q4H PRN, 1 mg at 22 **AND** naloxone (NARCAN) injection 0.4 mg, 0.4 mg, Intravenous, Q5 Min PRN, Charlene Castro MD  •  nystatin (MYCOSTATIN) powder, , Topical, Q12H, Perez Hooker MD, Given at 22 210  •  ondansetron ODT (ZOFRAN-ODT) disintegrating tablet 4 mg, 4 mg, Oral, Q8H PRN, Perez Hooker MD, 4 mg at 22 1636  •  pantoprazole (PROTONIX) injection 40 mg, 40 mg, Intravenous, Q24H, Charlene Castro MD, 40 mg at 22 1405  •  potassium chloride (MICRO-K) CR capsule 40 mEq, 40 mEq, Oral, PRN **OR** potassium chloride (KLOR-CON) packet 40 mEq, 40 mEq, Oral, PRN, 40 mEq at 22 1210 **OR** potassium chloride 10 mEq in 100 mL IVPB, 10 mEq, Intravenous, Q1H PRN, Huy Santa MD  •  [DISCONTINUED] sodium chloride 0.9 % infusion, 200 mL/hr, Intravenous, Continuous PRN, Stopped at 22 0130 **AND** potassium chloride 10 mEq in 100 mL IVPB, 10 mEq, Intravenous, Continuous PRN, Charlene Castro MD, Stopped at 22 0130  •  [DISCONTINUED] sodium chloride 0.45 % infusion, 150 mL/hr, Intravenous, Continuous PRN, Stopped at 22 0211 **AND** potassium chloride 10 mEq in 100 mL IVPB, 10 mEq, Intravenous, Continuous PRN, Charlene Castro MD, Stopped at 22 0212  •  sodium chloride 0.9 % flush 10 mL,  10 mL, Intravenous, Q12H, Jerman Coffman MD, 10 mL at 07/06/22 2110  •  sodium chloride 0.9 % flush 10 mL, 10 mL, Intravenous, Q12H, Jerman Coffman MD, 10 mL at 07/06/22 2110  •  sodium chloride 0.9 % flush 10 mL, 10 mL, Intravenous, Q12H, Jerman Coffman MD, 10 mL at 07/06/22 2110  •  sodium chloride 0.9 % flush 10 mL, 10 mL, Intravenous, PRAugustus LAYTON Andrew, MD  •  sodium chloride 0.9 % flush 20 mL, 20 mL, Intravenous, Augustus PARKS Andrew, MD     Diagnostic Data    Lab Results (last 24 hours)     Procedure Component Value Units Date/Time    POC Glucose Once [[id number]]  (Normal) Collected: 07/07/22 0631    Specimen: Blood Updated: 07/07/22 0645     Glucose 93 mg/dL      Comment: : [id number] DARIEN SelenokhodMeter ID: [id number]       POC Glucose Once [[id number]]  (Abnormal) Collected: 07/06/22 1934    Specimen: Blood Updated: 07/06/22 2010     Glucose 246 mg/dL      Comment: RN NotifiedOperator: [id number] DARIEN SARAHMeter ID: [id number]       POC Glucose Once [[id number]]  (Abnormal) Collected: 07/06/22 1608    Specimen: Blood Updated: 07/06/22 1632     Glucose 232 mg/dL      Comment: RN NotifiedOperator: [id number] MARKS ALEXISMeter ID: [id number]       POC Glucose Once [[id number]]  (Abnormal) Collected: 07/06/22 1402    Specimen: Blood Updated: 07/06/22 1425     Glucose 172 mg/dL      Comment: RN NotifiedOperator: [id number] [facility] ID: [id number]       POC Glucose Once [[id number]]  (Abnormal) Collected: 07/06/22 1045    Specimen: Blood Updated: 07/06/22 1057     Glucose 299 mg/dL      Comment: RN NotifiedOperator: [id number] MARKS ALEXISMeter ID: [id number]       Magnesium [[id number]]  (Normal) Collected: 07/06/22 0551    Specimen: Blood Updated: 07/06/22 1056     Magnesium 2.3 mg/dL            Imaging Results (Last 24 Hours)     ** No results found for the last 24 hours. **          I reviewed the patient's new clinical results.    Assessment/Plan:     Active Hospital Problems     Diagnosis    • **Ventilator associated pneumonia (HCC)      Procal slightly improved   CRP improved, repeat q48 hrs  Blood cx negative   respiratory culture positive for MRSA  On linezolid switched to PO, tolerating well     • Yeast UTI      Ur cx showed yeast isolate  On diflucan started 7/5/22, therapy for 14 days      • Positive fecal occult blood test      Hgb stable  GI consulted and appreciate recs     • Accidental drug overdose      Family reports of 10 tablets of 10 mg of Flexeril ingested.  - Overdose labs obtained including UDS, acetaminophen, salicylate, ethanol level  - Poison control notified they Recommend symptomatic care  - Monitor Qtc interval changes, improved  - Psych consulted for concern for depression, appreciate recs       • Acute respiratory failure with hypoxia and hypercapnia ([facility])      On home oxygen 2L  Home Trilogy at night     • Anemia      Hgb 6.9 on admission   - received 2u PRBC  - Type and screen   - Monitor H &H   -- H&H stable      • ESRD on hemodialysis ([facility])      -Receives hemodialysis MWF at [facility] in [place]  Missed dialysis per family prior to arrival   Nephrology consulted appreciated recs          • Type 2 diabetes mellitus with autonomic neuropathy ([facility])      On moderate SSI  On levemir 25u bid  On 10u QID     • Acute cystitis with hematuria      Ur culture showed yeast isolate  On diflucan started 7/5/22, needs therapy for 14 days  Full dose on HD days after HD  1/2 dose on non-HD days     • Hypothyroidism      -home synthroid 25 mcg     • Hypertension      On home metoprolol and imdur  Lisinopril on non-HD days  BP stable  - PRN Hydralazine       • Coronary artery disease involving native coronary artery of native heart without angina pectoris      - S/P CABG, Bilateral carotid artery stenosis w/ 2 stents on left side,  PAD (peripheral artery disease) with stent in right upper leg  - Cardiology on board       • COPD (chronic obstructive pulmonary disease) ([facility])       On home oxygen 2-3L NC  Pt supplied trilogy at night           DVT prophylaxis: SCDs/TEDs  Code Status and Medical Interventions:   Ordered at: 06/25/22 6301     Level Of Support Discussed With:    Patient     Code Status (Patient has no pulse and is not breathing):    CPR (Attempt to Resuscitate)     Medical Interventions (Patient has pulse or is breathing):    Full Support       Plan for disposition:home with  today      Time: 15 minutes        Jerman Coffman MD   PGY-[address]    [place] [address]  Office: [phone]    This document has been electronically signed by Jerman Coffman MD on July 7, 2022 08:03 CDT

## 2022-07-07 NOTE — THERAPY TREATMENT NOTE
Patient Name: Yamileth Slater  : 1960    MRN: [id number]                              Today's Date: 2022       Admit Date: 2022    Visit Dx:     ICD-10-CM ICD-9-CM   1. Altered mental status, unspecified altered mental status type  R41.82 780.97   2. Acute respiratory failure, unspecified whether with hypoxia or hypercapnia ([facility])  J96.00 518.81   3. Acute pulmonary edema ([facility])  J81.0 518.4   4. ESRD (end stage renal disease) ([facility])  N18.6 585.6   5. Hyperglycemia  R73.9 790.29   6. Pharyngeal dysphagia  R13.13 787.23   7. Impaired functional mobility, balance, gait, and endurance  Z74.09 V49.89   8. Impaired mobility and activities of daily living  Z74.09 V49.89    Z78.9    9. Physical deconditioning  R53.81 799.3   10. Acute respiratory failure with hypoxia and hypercapnia ([facility])  J96.01 518.81    J96.02    11. Ventilator associated pneumonia ([facility])  J95.851 997.31   12. PAD (peripheral artery disease) ([facility])  I73.9 443.9   13. Morbid obesity with BMI of 50.0-59.9, adult ([facility])  E66.01 278.01    Z68.43 V85.43     Patient Active Problem List   Diagnosis   • Chronic midline low back pain without sciatica   • Vitamin D deficiency   • Tobacco dependence syndrome   • Shoulder joint pain   • Peripheral vascular disease ([facility])   • Morbid obesity with BMI of 50.0-59.9, adult ([facility])   • Mixed hyperlipidemia   • Kidney stone   • History of colon polyps   • GERD (gastroesophageal reflux disease)   • Excoriated eczema   • Hypertension   • COPD (chronic obstructive pulmonary disease) ([facility])   • Chronic folliculitis   • Coronary artery disease involving native coronary artery of native heart without angina pectoris   • 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   • Hypothyroidism   • Carotid artery disease ([facility])   • [place] abuse   • PAD (peripheral artery disease) ([facility])   • Venous insufficiency   • LAFB (left anterior fascicular block)   • Pulmonary hypertension ([facility])   • Left hip pain   • Neck pain    • MIKE and COPD overlap syndrome ([facility])   • Pneumonia due to COVID-19 virus   • Hyperkalemia   • Cutaneous candidiasis   • Community acquired pneumonia of right middle lobe of lung   • MRSA infection   • Esophageal dysphagia   • Monilial esophagitis ([facility])   • NSTEMI, initial episode of care ([facility])   • Cellulitis of left leg   • Urinary tract infection due to Proteus   • History of insertion of tunneled central venous catheter (CVC) with port   • Anemia due to chronic kidney disease, on chronic dialysis ([facility])   • Pneumonitis   • Personal history of noncompliance with medical treatment, presenting hazards to health   • Type 2 diabetes mellitus with autonomic neuropathy ([facility])   • Physical deconditioning   • ESRD on hemodialysis ([facility])   • DVT prophylaxis   • Anemia   • Ventilator associated pneumonia ([facility])   • Positive fecal occult blood test   • Yeast UTI     Past Medical History:   Diagnosis Date   • Acute blood loss anemia 4/16/2017    Likely due to gastric oozing at this time. - Dr. Duarte (GI) was consulted and has now signed off, will follow up outpatient - pill colonoscopy showed AVMs - continue to monitor   • Acute cystitis with hematuria 3/31/2021    1/13: IV Rocephin 1 gm q 24 1/14 : transitioned  to omnicef 300mg. Urine cultures resulted and did not show growth. Omnicef discontinued as patient is asymptomatic   • Altered mental status 1/9/2022    - AMS on presentation - initial ABG pH 7.3, CO2 34 - Procal 0.29 - UA negative for acute cysitits -CTA head wnl  - Empiric Zosyn and Vancomycin -Lactate 2.5 on admission  - blood cultures no growth at 24 hours     • Anxiety    • CAD (coronary artery disease) 4/24/2021    S/P 3 stents 5/1/2021 for [facility]HL Continue ASA 81mg & Clopidogrel 75mg Continue Atorvastatin 40mg   • Carotid artery stenosis    • Chronic obstructive lung disease ([facility])    • CKD (chronic kidney disease) stage 4, GFR 15-29 ml/min ([facility])    • CKD (chronic kidney disease), symptom management only, stage 5  ([facility]) 10/5/2020    Results from last 7 days Lab Units 12/15/21 0548 12/14/21 1323 12/14/21 0916 CREATININE mg/dL 3.92* 3.21* 3.32*  Baseline creatinine 2-3 GFR 13-25 GFR 15 Dialysis MWF, sees Dr. Lauren Nephrology consult,, appreciate recommendations Continue Bumex 1mg bid daily Holding Bumex 2mg 4 times a week   • Colonic polyp    • Coronary arteriosclerosis    • Diabetes mellitus ([facility])    • Diabetic neuropathy ([facility])    • Ear pain, right 10/18/2021    - canal trauma due to patient scratching and DMT2 - added cortisporin ear drops   • Elevated troponin 10/12/2021    -most likely from CKD -Trending down -Neg chest pain   • Generalized abdominal pain 7/1/2022    Could be due to initiation of tube feeds vs dyspepsia vs abdominal cramps related to no PO intake due to intubation vs constipation Continue current laxative regiment  If no bowel movement by this afternoon will consider enema   • GERD (gastroesophageal reflux disease)    • GI bleed 5/13/2021    - GI will follow up outpatient - Protonix 40mg daily - Avoid medical DVT prophy and use mechanical at this time instead. - Continue to monitor - pill colonoscopy results showed AVMs   • History of transfusion    • Hypercholesterolemia    • Hyperosmolar hyperglycemic state (HHS) ([facility]) 6/25/2022    Serum glucose 605 on admission  Anion gap 16 PH 7.37 Bicarb 27.9 Continue fluids  Insulin drip with HHS protocol  Anion gap closed around 10 AM, received one dose of Levamir subq, will stop insulin drip after 2 hrs     • Hypertension    • Hypokalemia 5/27/2022    Will replace as needed. Will be cautious in the setting of ESRD to avoid need for emergency dialysis   Monitor Qtc intervals on EKG     • Hypomagnesemia 6/27/2021    Monitor and replace   • Morbid obesity ([facility])    • Nephrolithiasis    • On mechanically assisted ventilation ([facility]) 6/26/2022    Vent management and sedation orders placed.  - [facility] intensivist group consulted for vent management appreciate  recommendations  - plan to extubate today     • Peripheral vascular disease ([facility])    • Pleural effusion on right 6/26/2022    CXR on 6/30/22 read as a small upper left pulmonary edema vs early pneumonia.  Last Echo 1/2022 EF 61-65 % Continue to monitor  Procal slightly improved, CRP improved On Linezolid and meropenum      • SIRS (systemic inflammatory response syndrome) ([facility]) 1/9/2022    Admission  - WBC 17.78   -   - RR 16 - 1/10: VSS/wnl - CXR - Mild pulm edema - Blood cultures no growth at 24 hours  - Procalcitonin 0.29 - UA : glucose 1000, negative Leucocytes/nitrate - Empiric Zosyn and Vancomcyin    • Sleep apnea    • Substance abuse ([facility])    • Vitamin D deficiency      Past Surgical History:   Procedure Laterality Date   • CARDIAC CATHETERIZATION N/A 7/14/2020   • CARDIAC CATHETERIZATION N/A 4/23/2021    Procedure: Left Heart Cath;  Surgeon: Melba oRmo MD;  Location: [facility] CATH INVASIVE LOCATION;  Service: Cardiology;  Laterality: N/A;   • CARDIAC CATHETERIZATION N/A 4/30/2021    Procedure: Percutaneous Coronary Intervention;  Surgeon: Russell Voss MD;  Location: [facility] CATH INVASIVE LOCATION;  Service: Cardiovascular;  Laterality: N/A;   • CARDIAC CATHETERIZATION N/A 4/30/2021    Procedure: Stent NIKKI coronary;  Surgeon: Russell Voss MD;  Location: [facility] CATH INVASIVE LOCATION;  Service: Cardiovascular;  Laterality: N/A;   • CARDIAC CATHETERIZATION Left 11/13/2021    Procedure: Left Heart Cath;  Surgeon: Niall Rios MD;  Location: [facility] CATH INVASIVE LOCATION;  Service: Cardiology;  Laterality: Left;   • CAROTID STENT Left    • COLONOSCOPY     • COLONOSCOPY N/A 5/14/2021    Procedure: COLONOSCOPY;  Surgeon: Mingo Duarte MD;  Location: [facility] ENDOSCOPY;  Service: Gastroenterology;  Laterality: N/A;   • CORONARY ARTERY BYPASS GRAFT N/A 2013    CABG X 3   • CYSTOSCOPY BLADDER STONE LITHOTRIPSY Bilateral    • ENDOSCOPY N/A 4/12/2021    Procedure:  ESOPHAGOGASTRODUODENOSCOPY;  Surgeon: Mingo Duarte MD;  Location: [facility] ENDOSCOPY;  Service: Gastroenterology;  Laterality: N/A;   • ENDOSCOPY N/A 5/14/2021    Procedure: ESOPHAGOGASTRODUODENOSCOPY;  Surgeon: Mingo Duarte MD;  Location: [facility] ENDOSCOPY;  Service: Gastroenterology;  Laterality: N/A;   • ENDOSCOPY N/A 1/28/2022    Procedure: ESOPHAGOGASTRODUODENOSCOPY;  Surgeon: Mingo Duarte MD;  Location: [facility] ENDOSCOPY;  Service: Gastroenterology;  Laterality: N/A;   • INTERVENTIONAL RADIOLOGY PROCEDURE N/A 10/21/2021    Procedure: tunneled central venous catheter placement;  Surgeon: Donnie Robles MD;  Location: [facility] ANGIO INVASIVE LOCATION;  Service: Interventional Radiology;  Laterality: N/A;   • INTERVENTIONAL RADIOLOGY PROCEDURE N/A 1/27/2022    Procedure: tunneled central venous catheter placement;  Surgeon: Donnie Robles MD;  Location: [facility] ANGIO INVASIVE LOCATION;  Service: Interventional Radiology;  Laterality: N/A;      General Information     Row Name 07/07/22 0725          OT Time and Intention    Document Type therapy note (daily note)  -LW     Mode of Treatment individual therapy;occupational therapy  -     Row Name 07/07/22 0725          General Information    Patient Profile Reviewed yes  -LW     Existing Precautions/Restrictions fall  -     Row Name 07/07/22 0725          Cognition    Orientation Status (Cognition) oriented x 3  -     Row Name 07/07/22 0725          Safety Issues, Functional Mobility    Impairments Affecting Function (Mobility) endurance/activity tolerance  -           User Key  (r) = Recorded By, (t) = Taken By, (c) = Cosigned By    Initials Name Provider Type    LW Rekha Perez COTA Occupational Therapist Assistant                 Mobility/ADL's     Row Name 07/07/22 0725          Transfers    Transfers sit-stand transfer;stand-sit transfer  -LW     Sit-Stand [place] (Transfers) contact guard  -LW     Stand-Sit  [place] (Transfers) contact guard  -LW     Row Name 07/07/22 0725          Sit-Stand Transfer    Assistive Device (Sit-Stand Transfers) walker, front-wheeled  -LW     Row Name 07/07/22 0725          Stand-Sit Transfer    Assistive Device (Stand-Sit Transfers) walker, front-wheeled  -LW     Row Name 07/07/22 0725          Activities of Daily Living    BADL Assessment/Intervention bathing;upper body dressing;lower body dressing;grooming  -LW     Row Name 07/07/22 0725          Bathing Assessment/Intervention    [place] Level (Bathing) lower body;upper body;upper extremities;chest/trunk;distal lower extremities/feet;proximal lower extremities;perineal area;supervision  -LW     Comment, (Bathing) [place] bath  -LW     Row Name 07/07/22 0725          Upper Body Dressing Assessment/Training    [place] Level (Upper Body Dressing) doff;don  Hospital gown  -LW     Position (Upper Body Dressing) supported sitting  -LW     Row Name 07/07/22 0725          Lower Body Dressing Assessment/Training    [place] Level (Lower Body Dressing) doff;don;socks;supervision  -LW     Assistive Devices (Lower Body Dressing) sock-aid  -LW     Position (Lower Body Dressing) supported sitting;supported standing  -LW     Row Name 07/07/22 0725          Grooming Assessment/Training    [place] Level (Grooming) hair care, combing/brushing;wash face, hands  -LW     Position (Grooming) supported sitting  -LW           User Key  (r) = Recorded By, (t) = Taken By, (c) = Cosigned By    Initials Name Provider Type    LW Rekha Perez COTA Occupational Therapist Assistant               Obj/Interventions    No documentation.                Goals/Plan     Row Name 07/07/22 0725          Transfer Goal 1 (OT)    Activity/Assistive Device (Transfer Goal 1, OT) transfers, all  -LW     [place] Level/Cues Needed (Transfer Goal 1, OT) contact guard required  -LW     Time Frame (Transfer Goal 1, OT) long term goal (LTG)  -LW      Progress/Outcome (Transfer Goal 1, OT) goal not met  -LW     Row Name 07/07/22 0725          Bathing Goal 1 (OT)    Activity/Device (Bathing Goal 1, OT) upper body bathing  -LW     [place] Level/Cues Needed (Bathing Goal 1, OT) supervision required  -LW     Time Frame (Bathing Goal 1, OT) long term goal (LTG)  -LW     Progress/Outcomes (Bathing Goal 1, OT) goal not met  -     Row Name 07/07/22 0725          Dressing Goal 1 (OT)    Activity/Device (Dressing Goal 1, OT) dressing skills, all  -LW     [place]/Cues Needed (Dressing Goal 1, OT) contact guard required  -LW     Time Frame (Dressing Goal 1, OT) long term goal (LTG)  -LW     Progress/Outcome (Dressing Goal 1, OT) goal met   -     Row Name 07/07/22 0725          Toileting Goal 1 (OT)    Activity/Device (Toileting Goal 1, OT) toileting skills, all  -LW     [place] Level/Cues Needed (Toileting Goal 1, OT) supervision required  -LW     Time Frame (Toileting Goal 1, OT) long term goal (LTG)  -LW     Progress/Outcome (Toileting Goal 1, OT) goal not met  -LW           User Key  (r) = Recorded By, (t) = Taken By, (c) = Cosigned By    Initials Name Provider Type    LW Rekha Perez COTA Occupational Therapist Assistant               Clinical Impression     Row Name 07/07/22 0725          Pain Assessment    Pretreatment Pain Rating 0/10 - no pain  -LW     Posttreatment Pain Rating 0/10 - no pain  -LW     Row Name 07/07/22 0725          Plan of Care Review    Plan of Care Reviewed With patient  -LW     Progress improving  -LW     Outcome Evaluation Pt sitting in recliner. Agreed to ADL's. UB and LB bathing and dressing Supervision. Grooming Supervision. Sit to stand X5 CGA. Pt sitting bschair all needs in reach.  -     Row Name 07/07/22 0725          Positioning and Restraints    Pre-Treatment Position sitting in chair/recliner  -LW     Post Treatment Position chair  -LW     In Chair reclined;call light within reach;encouraged to call for  assist;exit alarm on  -LW           User Key  (r) = Recorded By, (t) = Taken By, (c) = Cosigned By    Initials Name Provider Type    Rekha Trotter COTA Occupational Therapist Assistant               Outcome Measures     Row Name 07/07/22 0711          How much help from another is currently needed...    Putting on and taking off regular lower body clothing? 2  -LW     Bathing (including washing, rinsing, and drying) 2  -LW     Toileting (which includes using toilet bed pan or urinal) 2  -LW     Putting on and taking off regular upper body clothing 3  -LW     Taking care of personal grooming (such as brushing teeth) 3  -LW     Eating meals 4  -LW     AM-PAC 6 Clicks Score (OT) 16  -LW           User Key  (r) = Recorded By, (t) = Taken By, (c) = Cosigned By    Initials Name Provider Type    Rekha Trotter COTA Occupational Therapist Assistant                Occupational Therapy Education                 Title: PT OT SLP Therapies (Resolved)     Topic: Occupational Therapy (Resolved)     Point: ADL training (Resolved)     Description:   Instruct learner(s) on proper safety adaptation and remediation techniques during self care or transfers.   Instruct in proper use of assistive devices.              Learning Progress Summary           Patient Acceptance, E, NR by RC at 7/6/2022 0910                   Point: Home exercise program (Resolved)     Description:   Instruct learner(s) on appropriate technique for monitoring, assisting and/or progressing therapeutic exercises/activities.              Learner Progress:  Not documented in this visit.          Point: Precautions (Resolved)     Description:   Instruct learner(s) on prescribed precautions during self-care and functional transfers.              Learning Progress Summary           Patient Acceptance, E, NR by RC at 7/6/2022 0910    Acceptance, E, VU by RB at 7/3/2022 6008    Comment: Edu pt on use of gait belt and non skid socks when OOB and no OOB  without assist.                   Point: Body mechanics (Resolved)     Description:   Instruct learner(s) on proper positioning and spine alignment during self-care, functional mobility activities and/or exercises.              Learning Progress Summary           Patient Acceptance, E, NR by  at 7/6/2022 0910                               User Key     Initials Effective Dates Name Provider Type Discipline    RB 06/16/21 -  oTney Mantilla OT Occupational Therapist OT    ИВАН 06/16/21 -  Jyoti Jaramillo COTA Occupational Therapist Assistant OT              OT Recommendation and Plan     Plan of Care Review  Plan of Care Reviewed With: patient  Progress: improving  Outcome Evaluation: Pt sitting in recliner. Agreed to ADL's. UB and LB bathing and dressing Supervision. Grooming Supervision. Sit to stand X5 CGA. Pt sitting bschair all needs in reach.     Time Calculation:    Time Calculation- OT     Row Name 07/07/22 0725             Time Calculation- OT    OT Start Time 0725  -LW      OT Stop Time 0825  -LW      OT Time Calculation (min) 60 min  -LW      Total Timed Code Minutes- OT 60 minute(s)  -LW      OT Received On 07/07/22  -LW              Timed Charges    24375 - OT Therapeutic Exercise Minutes 15  -LW      18457 - OT Self Care/Mgmt Minutes 45  -LW              Total Minutes    Timed Charges Total Minutes 60  -LW       Total Minutes 60  -LW            User Key  (r) = Recorded By, (t) = Taken By, (c) = Cosigned By    Initials Name Provider Type    LW Rekha Perez COTA Occupational Therapist Assistant              Therapy Charges for Today     Code Description Service Date Service Provider Modifiers Qty    87273216869 HC OT THER PROC EA 15 MIN 7/7/2022 Rekha Perez COTA GO 1    96571678336 HC OT SELF CARE/MGMT/TRAIN EA 15 MIN 7/7/2022 Rekha Perez COTA GO 3               ISHAN Cosme  7/7/2022

## 2022-07-07 NOTE — PAYOR COMM NOTE
"    Karmen Colorado RN [facility]  [phone]     Phone  [phone]       Fax  Cont. Stay REview      Yamileth Slater (62 y.o. Female)             Date of Birth   1960    Social Security Number       Address   [address] [place] [address]    Home Phone   [phone]    MRN   [id number]       Roman Catholic   None    Marital Status                               Admission Date   6/25/22    Admission Type   Emergency    Admitting Provider   Charlene Castro MD    Attending Provider   Perez Hooker MD    Department, Room/Bed   [facility] [address] [place], 304/1       Discharge Date       Discharge Disposition       Discharge Destination                               Attending Provider: Perez Hooker MD    Allergies: Adhesive Tape, Latex, Nsaids, Other    Isolation: Contact   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                 Active Insurance as of 6/25/2022     Primary Coverage     Payor Plan Insurance Group Employer/Plan Group    ANTHEM MEDICARE REPLACEMENT ANTHEM MEDICARE ADVANTAGE [id number]     Payor Plan Address Payor Plan Phone Number Payor Plan Fax Number Effective Dates    [address] [phone]  1/1/2022 - None Entered    [facility] [address]       Subscriber Name Subscriber Birth Date Member ID       YAMILETH SLATER 1960 [id number]           Secondary Coverage     Payor Plan Insurance Group Employer/Plan Group    [place]      Payor Plan Address Payor Plan Phone Number Payor Plan Fax Number Effective Dates    [address] [phone]  6/28/2019 - None Entered    [facility] [id number]       Subscriber Name Subscriber Birth Date Member ID       " YAMILETH SLATER 1960 [id number]                 Emergency Contacts      (Rel.) Home Phone Work Phone Mobile Phone    Huy Slater (Spouse) [phone] -- [phone]    Yamileth Chavez (Daughter) [phone] -- [phone]    Suzanne Rivera (Daughter) [phone] -- [phone]            Vital Signs (last day)     Date/Time Temp Temp src Pulse Resp BP Patient Position SpO2    07/07/22 1140 97 (36.1) Temporal 65 18 122/56 Sitting 97    07/07/22 0716 96.8 (36) Temporal 66 18 119/54 Sitting 96    07/07/22 0712 -- -- 66 -- -- -- --    07/07/22 0118 -- -- 65 -- -- -- --    07/06/22 2320 97.3 (36.3) Infrared 71 18 154/70 Lying 93    07/06/22 1932 97 (36.1) Infrared 71 18 109/55 Lying 97    07/06/22 1511 -- -- 74 -- -- -- --    07/06/22 1502 97.5 (36.4) Temporal 75 18 100/50 Lying 99    07/06/22 1403 -- -- 76 18 136/70 Sitting 98    07/06/22 0721 -- -- 73 -- -- -- --    07/06/22 0704 97.2 (36.2) Temporal 70 18 149/76 Sitting 95    07/06/22 0417 97.3 (36.3) Infrared 71 18 147/69 Lying 95    07/06/22 0227 -- -- 75 -- -- -- --          Oxygen Therapy (last day)     Date/Time SpO2 Device (Oxygen Therapy) Flow (L/min) Oxygen Concentration (%) ETCO2 (mmHg)    07/07/22 1140 97 nasal cannula 2 -- --    07/07/22 0716 96 nasal cannula 2 -- --    07/06/22 2320 93 -- -- -- --    07/06/22 1932 97 -- -- -- --    07/06/22 1502 99 nasal cannula 2 -- --    07/06/22 1403 98 nasal cannula 2 -- --    07/06/22 0704 95 nasal cannula 2 -- --    07/06/22 0417 95 -- -- -- --          Current Facility-Administered Medications   Medication Dose Route Frequency Provider Last Rate Last Admin   • acetaminophen (TYLENOL) tablet 500 mg  500 mg Oral Q6H PRN Perez Hooker MD   500 mg at 07/03/22 1613   • sennosides-docusate (PERICOLACE) 8.6-50 MG per tablet 2 tablet  2 tablet Oral BID Charlene Castro MD   2 tablet at 07/06/22 1406    And   • polyethylene glycol (MIRALAX) packet 17 g  17 g Oral Daily PRN Gloria  MD Charlene   17 g at 07/01/22 1249    And   • bisacodyl (DULCOLAX) EC tablet 5 mg  5 mg Oral Daily PRN Charlene Castro MD   5 mg at 06/29/22 1715    And   • bisacodyl (DULCOLAX) suppository 10 mg  10 mg Rectal Daily PRN Charlene Castro MD       • bumetanide (BUMEX) tablet 2 mg  2 mg Oral Once per day on Sun Tue Thu Ace Ng MD   2 mg at 07/07/22 0835   • dextrose (D50W) (25 g/50 mL) IV injection 10-50 mL  10-50 mL Intravenous Q15 Min PRN Charlene Castro MD       • dextrose (D50W) (25 g/50 mL) IV injection 25 g  25 g Intravenous Q15 Min PRN Jung Leonard MD       • dextrose (GLUTOSE) oral gel 15 g  15 g Oral Q15 Min PRN Jung Leonard MD       • epoetin hubert (EPOGEN,PROCRIT) injection 6,000 Units  6,000 Units Intravenous Once per day on Mon Wed Fri Ace Lauren MD   6,000 Units at 07/06/22 1522   • fluconazole (DIFLUCAN) tablet 200 mg  200 mg Oral Once per day on Mon Wed Fri Jerman Coffman MD   200 mg at 07/06/22 1406    And   • fluconazole (DIFLUCAN) tablet 100 mg  100 mg Oral Once per day on Sun Tue Thu Jerman House MD   100 mg at 07/07/22 0835   • gabapentin (NEURONTIN) capsule 300 mg  300 mg Nasogastric Daily Perez Hooker MD   300 mg at 07/07/22 0835   • glucagon (human recombinant) (GLUCAGEN DIAGNOSTIC) injection 1 mg  1 mg Intramuscular Q15 Min PRN Jung Leonard MD       • guaiFENesin (MUCINEX) 12 hr tablet 1,200 mg  1,200 mg Oral Q12H Perez Hooker MD   1,200 mg at 07/07/22 0835   • guaiFENesin (ROBITUSSIN) 100 MG/5ML oral solution 400 mg  400 mg Oral Q6H PRN Jerman Coffman MD       • heparin (porcine) injection 4,000 Units  4,000 Units Intracatheter PRN Ace Lauren MD   4,000 Units at 07/06/22 1331   • heparin (porcine) injection 4,000 Units  4,000 Units Intracatheter PRN Ace Lauren MD       • hydrALAZINE (APRESOLINE) injection 10 mg  10 mg Intravenous Q6H PRN Jung Leonard MD   10 mg at 07/01/22 0034   • Insulin Aspart (novoLOG) injection 0-9 Units   0-9 Units Subcutaneous TID AC Perez Hooker MD   7 Units at 07/07/22 1105   • Insulin Aspart (novoLOG) injection 10 Units  10 Units Subcutaneous 4x Daily Perez Hooker MD   10 Units at 07/07/22 1105   • insulin detemir (LEVEMIR) injection 25 Units  25 Units Subcutaneous Q12H Perez Hooker MD   25 Units at 07/07/22 1039   • isosorbide mononitrate (IMDUR) 24 hr tablet 30 mg  30 mg Oral Q24H Jung Leonard MD   30 mg at 07/07/22 0835   • levothyroxine (SYNTHROID, LEVOTHROID) tablet 25 mcg  25 mcg Oral Q AM Perez Hooker MD   25 mcg at 07/07/22 0549   • linezolid (ZYVOX) tablet 600 mg  600 mg Oral Q12H Jerman Coffman MD   600 mg at 07/07/22 0838   • lisinopril (PRINIVIL,ZESTRIL) tablet 20 mg  20 mg Oral Once per day on Sun Tue Thu Sat Jung Leonard MD   20 mg at 07/07/22 0835   • magnesium sulfate 4 gram infusion - Mg less than or equal to 1mg/dL  4 g Intravenous PRN Charlene Castro MD        Or   • magnesium sulfate 3 gram infusion (1gm x 3) - Mg 1.1 - 1.5 mg/dL  1 g Intravenous PRN Charlene Castro MD        Or   • Magnesium Sulfate 2 gram infusion- Mg 1.6 - 1.9 mg/dL  2 g Intravenous PRN Charlene Castro MD 25 mL/hr at 07/01/22 1617 2 g at 07/01/22 1617   • metoprolol tartrate (LOPRESSOR) injection 5 mg  5 mg Intravenous Q5 Min PRN Froylan Lu MD   5 mg at 06/26/22 1921   • metoprolol tartrate (LOPRESSOR) tablet 25 mg  25 mg Oral Q12H Froylan Lu MD   25 mg at 07/07/22 0835   • naloxone (NARCAN) injection 0.4 mg  0.4 mg Intravenous Q5 Min PRN Charlene Castro MD       • nystatin (MYCOSTATIN) powder   Topical Q12H Perez Hooker MD   Given at 07/07/22 0835   • ondansetron ODT (ZOFRAN-ODT) disintegrating tablet 4 mg  4 mg Oral Q8H PRN Perez Hooker MD   4 mg at 07/06/22 1636   • pantoprazole (PROTONIX) injection 40 mg  40 mg Intravenous Q24H Charlene Castro MD   40 mg at 07/07/22 0836   • potassium chloride (MICRO-K) CR capsule 40 mEq  40  mEq Oral PRN Huy Santa MD        Or   • potassium chloride (KLOR-CON) packet 40 mEq  40 mEq Oral PRN Huy Santa MD   40 mEq at 22 1210    Or   • potassium chloride 10 mEq in 100 mL IVPB  10 mEq Intravenous Q1H PRN Huy Santa MD       • potassium chloride 10 mEq in 100 mL IVPB  10 mEq Intravenous Continuous PRN Charlene Castro MD   Stopped at 22 0130   • potassium chloride 10 mEq in 100 mL IVPB  10 mEq Intravenous Continuous PRN Charlene Castro MD   Stopped at 22 0212   • sodium chloride 0.9 % flush 10 mL  10 mL Intravenous Q12H Jerman Coffman MD   10 mL at 22 0838   • sodium chloride 0.9 % flush 10 mL  10 mL Intravenous Q12H Jerman Coffman MD   10 mL at 22 0835   • sodium chloride 0.9 % flush 10 mL  10 mL Intravenous Q12H Jerman Coffman MD   10 mL at 22 0838   • sodium chloride 0.9 % flush 10 mL  10 mL Intravenous PRN Jerman Coffman MD       • sodium chloride 0.9 % flush 20 mL  20 mL Intravenous PRN Jerman Coffman MD            Physician Progress Notes (last 48 hours)      Jerman Coffman MD at 22 0755          FAMILY MEDICINE DAILY PROGRESS NOTE  NAME: Yamileth Slater  : 1960  MRN: [id number]     LOS: 12 days     PROVIDER OF SERVICE: Jerman Coffman MD    Chief Complaint: Ventilator associated pneumonia (HCC)    Subjective:     Interval History:  History taken from: patient chart RN    Patient with no complaints this morning.  Yesterday she worked with PT and OT was able to walk with a walker around her room.  Patient denied referral to multiple nursing homes.  She only wanted to go to [facility] and rehab and her insurance would not pay for that.  She had dialysis yesterday which she tolerated fairly well.  She had 1 episode of confusion overnight which has since resolved in the morning.  Patient alert and oriented x3.  No chest pain, shortness of breath.  Sitting comfortably in the chair this morning.    Review of Systems:    Review of Systems   Constitutional: Negative for chills, fatigue and fever.   HENT: Negative for congestion, postnasal drip and rhinorrhea.    Eyes: Negative for pain and visual disturbance.   Respiratory: Negative for cough and shortness of breath.    Cardiovascular: Negative for chest pain, palpitations and leg swelling.   Gastrointestinal: Negative for abdominal pain, diarrhea, nausea and vomiting.   Genitourinary: Negative for difficulty urinating, dysuria and hematuria.   Musculoskeletal: Negative for arthralgias and back pain.   Neurological: Negative for dizziness, syncope, weakness, light-headedness and headaches.       Objective:     Vital Signs  Temp:  [96.8 °F (36 °C)-97.5 °F (36.4 °C)] 96.8 °F (36 °C)  Heart Rate:  [65-76] 66  Resp:  [18] 18  BP: (100-154)/(50-70) 119/54    Physical Exam  Physical Exam  Vitals and nursing note reviewed.   Constitutional:       Appearance: She is well-developed. She is obese. She is not ill-appearing or diaphoretic.   HENT:      Head: Normocephalic and atraumatic.   Eyes:      General: No scleral icterus.     Conjunctiva/sclera: Conjunctivae normal.   Cardiovascular:      Rate and Rhythm: Normal rate and regular rhythm.      Heart sounds: No murmur heard.  Pulmonary:      Effort: Pulmonary effort is normal. No respiratory distress.      Breath sounds: Normal breath sounds. No wheezing or rhonchi.   Abdominal:      General: Bowel sounds are normal. There is no distension.      Palpations: Abdomen is soft.      Tenderness: There is no abdominal tenderness. Negative signs include Frazier's sign.   Skin:     General: Skin is warm and dry.      Capillary Refill: Capillary refill takes less than 2 seconds.   Neurological:      General: No focal deficit present.      Mental Status: She is alert and oriented to person, place, and time. Mental status is at baseline.   Psychiatric:         Behavior: Behavior normal.         Medication Review    Current Facility-Administered  Medications:   •  acetaminophen (TYLENOL) tablet 500 mg, 500 mg, Oral, Q6H PRN, Perez Hooker MD, 500 mg at 07/03/22 1613  •  sennosides-docusate (PERICOLACE) 8.6-50 MG per tablet 2 tablet, 2 tablet, Oral, BID, 2 tablet at 07/06/22 1406 **AND** polyethylene glycol (MIRALAX) packet 17 g, 17 g, Oral, Daily PRN, 17 g at 07/01/22 1249 **AND** bisacodyl (DULCOLAX) EC tablet 5 mg, 5 mg, Oral, Daily PRN, 5 mg at 06/29/22 1715 **AND** bisacodyl (DULCOLAX) suppository 10 mg, 10 mg, Rectal, Daily PRN, Charlene Castro MD  •  bumetanide (BUMEX) tablet 2 mg, 2 mg, Oral, Once per day on Sun Tue Thu Sat, Ace Lauren MD, 2 mg at 07/05/22 0840  •  dextrose (D50W) (25 g/50 mL) IV injection 10-50 mL, 10-50 mL, Intravenous, Q15 Min PRN, Charlene Castro MD  •  dextrose (D50W) (25 g/50 mL) IV injection 25 g, 25 g, Intravenous, Q15 Min PRN, Jung Leonard MD  •  dextrose (GLUTOSE) oral gel 15 g, 15 g, Oral, Q15 Min PRN, Jung Leonard MD  •  epoetin hubert (EPOGEN,PROCRIT) injection 6,000 Units, 6,000 Units, Intravenous, Once per day on Mon Wed Fri, Ace Lauren MD, 6,000 Units at 07/06/22 1522  •  fluconazole (DIFLUCAN) tablet 200 mg, 200 mg, Oral, Once per day on Mon Wed Fri, 200 mg at 07/06/22 1406 **AND** fluconazole (DIFLUCAN) tablet 100 mg, 100 mg, Oral, Once per day on Sun Tue Thu Sat, Jerman Coffman MD, 100 mg at 07/05/22 0839  •  gabapentin (NEURONTIN) capsule 300 mg, 300 mg, Nasogastric, Daily, Perez Hooker MD, 300 mg at 07/06/22 1406  •  glucagon (human recombinant) (GLUCAGEN DIAGNOSTIC) injection 1 mg, 1 mg, Intramuscular, Q15 Min PRN, Jung Leonard MD  •  guaiFENesin (MUCINEX) 12 hr tablet 1,200 mg, 1,200 mg, Oral, Q12H, Perez Hooker MD, 1,200 mg at 07/06/22 2103  •  guaiFENesin (ROBITUSSIN) 100 MG/5ML oral solution 400 mg, 400 mg, Oral, Q6H PRN, Jerman Coffman MD  •  heparin (porcine) injection 4,000 Units, 4,000 Units, Intracatheter, PRN, Ace Lauren MD, 4,000 Units at 07/06/22  1331  •  heparin (porcine) injection 4,000 Units, 4,000 Units, Intracatheter, PRN, Ace Lauren MD  •  hydrALAZINE (APRESOLINE) injection 10 mg, 10 mg, Intravenous, Q6H PRN, Jung Leonard MD, 10 mg at 22 0034  •  Insulin Aspart (novoLOG) injection 0-9 Units, 0-9 Units, Subcutaneous, TID AC, Perez Hooker MD, 4 Units at 22 1654  •  Insulin Aspart (novoLOG) injection 10 Units, 10 Units, Subcutaneous, 4x Daily, Perez oHoker MD, 10 Units at 22 2104  •  insulin detemir (LEVEMIR) injection 25 Units, 25 Units, Subcutaneous, Q12H, Perez Hooker MD, 25 Units at 22 2105  •  isosorbide mononitrate (IMDUR) 24 hr tablet 30 mg, 30 mg, Oral, Q24H, Jung Leonard MD, 30 mg at 22 1406  •  levothyroxine (SYNTHROID, LEVOTHROID) tablet 25 mcg, 25 mcg, Oral, Q AM, Perez Hooker MD, 25 mcg at 22 0549  •  linezolid (ZYVOX) tablet 600 mg, 600 mg, Oral, Q12H, Jerman Coffman MD, 600 mg at 22 2135  •  lisinopril (PRINIVIL,ZESTRIL) tablet 20 mg, 20 mg, Oral, Once per day on Sun Tue Thu Sat, Jung Leonard MD, 20 mg at 22 0840  •  magnesium sulfate 4 gram infusion - Mg less than or equal to 1mg/dL, 4 g, Intravenous, PRN **OR** magnesium sulfate 3 gram infusion (1gm x 3) - Mg 1.1 - 1.5 mg/dL, 1 g, Intravenous, PRN **OR** Magnesium Sulfate 2 gram infusion- Mg 1.6 - 1.9 mg/dL, 2 g, Intravenous, PRN, Charlene Castro MD, Last Rate: 25 mL/hr at 22 1617, 2 g at 22 1617  •  metoprolol tartrate (LOPRESSOR) injection 5 mg, 5 mg, Intravenous, Q5 Min PRN, Froylan Lu MD, 5 mg at 22  •  metoprolol tartrate (LOPRESSOR) tablet 25 mg, 25 mg, Oral, Q12H, Froylan Lu MD, 25 mg at 22  •  [] morphine injection 1 mg, 1 mg, Intravenous, Q4H PRN, 1 mg at 22 **AND** naloxone (NARCAN) injection 0.4 mg, 0.4 mg, Intravenous, Q5 Min PRN, Charlene Castro MD  •  nystatin (MYCOSTATIN) powder, , Topical, Q12H, Smith,  Perez Loera MD, Given at 07/06/22 2104  •  ondansetron ODT (ZOFRAN-ODT) disintegrating tablet 4 mg, 4 mg, Oral, Q8H PRN, Perez Hooker MD, 4 mg at 07/06/22 1636  •  pantoprazole (PROTONIX) injection 40 mg, 40 mg, Intravenous, Q24H, Charlene Castro MD, 40 mg at 07/06/22 1405  •  potassium chloride (MICRO-K) CR capsule 40 mEq, 40 mEq, Oral, PRN **OR** potassium chloride (KLOR-CON) packet 40 mEq, 40 mEq, Oral, PRN, 40 mEq at 07/02/22 1210 **OR** potassium chloride 10 mEq in 100 mL IVPB, 10 mEq, Intravenous, Q1H PRN, Huy Santa MD  •  [DISCONTINUED] sodium chloride 0.9 % infusion, 200 mL/hr, Intravenous, Continuous PRN, Stopped at 06/26/22 0130 **AND** potassium chloride 10 mEq in 100 mL IVPB, 10 mEq, Intravenous, Continuous PRN, Charlene Castro MD, Stopped at 06/26/22 0130  •  [DISCONTINUED] sodium chloride 0.45 % infusion, 150 mL/hr, Intravenous, Continuous PRN, Stopped at 06/26/22 0211 **AND** potassium chloride 10 mEq in 100 mL IVPB, 10 mEq, Intravenous, Continuous PRN, Charlene Castro MD, Stopped at 06/26/22 0212  •  sodium chloride 0.9 % flush 10 mL, 10 mL, Intravenous, Q12H, Jerman Coffman MD, 10 mL at 07/06/22 2110  •  sodium chloride 0.9 % flush 10 mL, 10 mL, Intravenous, Q12H, Jerman Coffman MD, 10 mL at 07/06/22 2110  •  sodium chloride 0.9 % flush 10 mL, 10 mL, Intravenous, Q12H, Jerman Coffman MD, 10 mL at 07/06/22 2110  •  sodium chloride 0.9 % flush 10 mL, 10 mL, Intravenous, PRN, Jerman Coffman MD  •  sodium chloride 0.9 % flush 20 mL, 20 mL, Intravenous, PRN, Jerman Coffman MD     Diagnostic Data    Lab Results (last 24 hours)     Procedure Component Value Units Date/Time    POC Glucose Once [[id number]]  (Normal) Collected: 07/07/22 0631    Specimen: Blood Updated: 07/07/22 0645     Glucose 93 mg/dL      Comment: : [id number] DARIEN WIGGINSAHMeter ID: [id number]       POC Glucose Once [[id number]]  (Abnormal) Collected: 07/06/22 1934    Specimen: Blood Updated:  07/06/22 2010     Glucose 246 mg/dL      Comment: RN NotifiedOperator: [id number] DARIEN Cruz ID: [id number]       POC Glucose Once [[id number]]  (Abnormal) Collected: 07/06/22 1608    Specimen: Blood Updated: 07/06/22 1632     Glucose 232 mg/dL      Comment: RN NotifiedOperator: [id number] MARKS ALEXISMeter ID: [id number]       POC Glucose Once [[id number]]  (Abnormal) Collected: 07/06/22 1402    Specimen: Blood Updated: 07/06/22 1425     Glucose 172 mg/dL      Comment: RN NotifiedOperator: [id number] [facility] ID: [id number]       POC Glucose Once [[id number]]  (Abnormal) Collected: 07/06/22 1045    Specimen: Blood Updated: 07/06/22 1057     Glucose 299 mg/dL      Comment: RN NotifiedOperator: [id number] MARKS ALEXISMeter ID: [id number]       Magnesium [[id number]]  (Normal) Collected: 07/06/22 0551    Specimen: Blood Updated: 07/06/22 1056     Magnesium 2.3 mg/dL            Imaging Results (Last 24 Hours)     ** No results found for the last 24 hours. **          I reviewed the patient's new clinical results.    Assessment/Plan:     Active Hospital Problems    Diagnosis    • **Ventilator associated pneumonia (HCC)      Procal slightly improved   CRP improved, repeat q48 hrs  Blood cx negative   respiratory culture positive for MRSA  On linezolid switched to PO, tolerating well     • Yeast UTI      Ur cx showed yeast isolate  On diflucan started 7/5/22, therapy for 14 days      • Positive fecal occult blood test      Hgb stable  GI consulted and appreciate recs     • Accidental drug overdose      Family reports of 10 tablets of 10 mg of Flexeril ingested.  - Overdose labs obtained including UDS, acetaminophen, salicylate, ethanol level  - Poison control notified they Recommend symptomatic care  - Monitor Qtc interval changes, improved  - Psych consulted for concern for depression, appreciate recs       • Acute respiratory failure with hypoxia and hypercapnia (HCC)      On home oxygen  2L  Home Trilogy at night     • Anemia      Hgb 6.9 on admission   - received 2u PRBC  - Type and screen   - Monitor H &H   -- H&H stable      • ESRD on hemodialysis ([facility])      -Receives hemodialysis MWF at [facility] in [place]  Missed dialysis per family prior to arrival   Nephrology consulted appreciated recs          • Type 2 diabetes mellitus with autonomic neuropathy ([facility])      On moderate SSI  On levemir 25u bid  On 10u QID     • Acute cystitis with hematuria      Ur culture showed yeast isolate  On diflucan started 7/5/22, needs therapy for 14 days  Full dose on HD days after HD  1/2 dose on non-HD days     • Hypothyroidism      -home synthroid 25 mcg     • Hypertension      On home metoprolol and imdur  Lisinopril on non-HD days  BP stable  - PRN Hydralazine       • Coronary artery disease involving native coronary artery of native heart without angina pectoris      - S/P CABG, Bilateral carotid artery stenosis w/ 2 stents on left side,  PAD (peripheral artery disease) with stent in right upper leg  - Cardiology on board       • COPD (chronic obstructive pulmonary disease) ([facility])      On home oxygen 2-3L NC  Pt supplied trilogy at night           DVT prophylaxis: SCDs/TEDs  Code Status and Medical Interventions:   Ordered at: 06/25/22 2157     Level Of Support Discussed With:    Patient     Code Status (Patient has no pulse and is not breathing):    CPR (Attempt to Resuscitate)     Medical Interventions (Patient has pulse or is breathing):    Full Support       Plan for disposition:home with HH today      Time: 15 minutes        Jerman Coffman MD   [address]    [place] [address]  Office: [phone]    This document has been electronically signed by Jerman Coffman MD on July 7, 2022 08:03 CDT          Electronically signed by Jerman Coffman MD at 07/07/22 0803     Jessica Triplett APRN at 07/06/22 1544          7/6/2022    Chief Complaint:   "Accidental overdose    Subjective:  Patient is a 62 y.o. female that is currently inpatient on med/surg services. She is seen today while in bed getting dialysis tx.  Pt reports she is doing well, she is wanting to go home but at this time requires more strengthening.    Pt states her mood is fine, she continues to state over taking of medication was mistake. Called and spoke with her , he states that it was an accident, that she had no idea that she took the wrong medication.          Objective     Vital Signs    Temp:  [96.9 °F (36.1 °C)-97.5 °F (36.4 °C)] 97.5 °F (36.4 °C)  Heart Rate:  [70-81] 75  Resp:  [18] 18  BP: (100-149)/(50-76) 100/50    Physical Exam:   General Appearance: alert, appears stated age and cooperative,  Hygiene:   fair  Gait & Station: in bed  Musculoskeletal: No tremors or abnormal involuntary movements    Mental Status Exam:   Cooperation:  Cooperative  Eye Contact:  Fair  Psychomotor Behavior:  Appropriate  Mood: \"Fine\"  Affect:  mood-congruent  Speech:  Normal  Thought Process:  Coherent  Associations: Goal Directed  Thought Content:     Mood congruent   Suicidal:  None   Homicidal:  None   Hallucinations:  None   Delusion:  None  Cognitive Functioning:   Consciousness: awake, alert and oriented  Reliability:  fair  Insight:  Fair  Judgement:  Fair  Impulse Control:  Fair    Lab Results (last 24 hours)     Procedure Component Value Units Date/Time    POC Glucose Once [[id number]]  (Abnormal) Collected: 07/06/22 1402    Specimen: Blood Updated: 07/06/22 1425     Glucose 172 mg/dL      Comment: RN NotifiedOperator: [id number] [facility] ID: [id number]       POC Glucose Once [[id number]]  (Abnormal) Collected: 07/06/22 1045    Specimen: Blood Updated: 07/06/22 1057     Glucose 299 mg/dL      Comment: RN NotifiedOperator: [id number] MARKS ALEXISMeter ID: [id number]       Magnesium [[id number]]  (Normal) Collected: 07/06/22 0551    Specimen: Blood Updated: 07/06/22 1056     " Magnesium 2.3 mg/dL     POC Glucose Once [[id number]]  (Abnormal) Collected: 07/06/22 0611    Specimen: Blood Updated: 07/06/22 0626     Glucose 293 mg/dL      Comment: RN NotifiedOperator: [id number] DARIEN Cruz ID: [id number]       Comprehensive Metabolic Panel [[id number]]  (Abnormal) Collected: 07/06/22 0551    Specimen: Blood Updated: 07/06/22 0621     Glucose 319 mg/dL      BUN 40 mg/dL      Creatinine 4.99 mg/dL      Sodium 132 mmol/L      Potassium 4.6 mmol/L      Chloride 93 mmol/L      CO2 24.0 mmol/L      Calcium 9.2 mg/dL      Total Protein 8.0 g/dL      Albumin 3.50 g/dL      ALT (SGPT) 5 U/L      AST (SGOT) 14 U/L      Alkaline Phosphatase 220 U/L      Total Bilirubin <0.2 mg/dL      Globulin 4.5 gm/dL      A/G Ratio 0.8 g/dL      BUN/Creatinine Ratio 8.0     Anion Gap 15.0 mmol/L      eGFR 9.2 mL/min/1.73      Comment: <15 Indicative of kidney failure       Narrative:      GFR Normal >60  Chronic Kidney Disease <60  Kidney Failure <15      C-reactive Protein [[id number]]  (Abnormal) Collected: 07/06/22 0551    Specimen: Blood Updated: 07/06/22 0619     C-Reactive Protein 7.72 mg/dL     CBC Auto Differential [[id number]]  (Abnormal) Collected: 07/06/22 0551    Specimen: Blood Updated: 07/06/22 0601     WBC 8.99 10*3/mm3      RBC 3.73 10*6/mm3      Hemoglobin 10.0 g/dL      Hematocrit 32.2 %      MCV 86.3 fL      MCH 26.8 pg      MCHC 31.1 g/dL      RDW 16.6 %      RDW-SD 51.6 fl      MPV 8.2 fL      Platelets 262 10*3/mm3      Neutrophil % 66.8 %      Lymphocyte % 21.2 %      Monocyte % 6.7 %      Eosinophil % 2.7 %      Basophil % 1.0 %      Immature Grans % 1.6 %      Neutrophils, Absolute 6.01 10*3/mm3      Lymphocytes, Absolute 1.91 10*3/mm3      Monocytes, Absolute 0.60 10*3/mm3      Eosinophils, Absolute 0.24 10*3/mm3      Basophils, Absolute 0.09 10*3/mm3      Immature Grans, Absolute 0.14 10*3/mm3      nRBC 0.0 /100 WBC     POC Glucose Once [[id number]]  (Abnormal) Collected: 07/05/22 1931     Specimen: Blood Updated: 07/05/22 2012     Glucose 318 mg/dL      Comment: RN NotifiedOperator: [id number] DARIEN Cruz ID: [id number]       POC Glucose Once [[id number]]  (Abnormal) Collected: 07/05/22 1612    Specimen: Blood Updated: 07/05/22 1623     Glucose 291 mg/dL      Comment: RN NotifiedOperator: [id number] SELINA FAYEISMeter ID: [id number]           Imaging Results (Last 24 Hours)     ** No results found for the last 24 hours. **          Medicine:   Current Facility-Administered Medications:   •  acetaminophen (TYLENOL) tablet 500 mg, 500 mg, Oral, Q6H PRN, Perez Hooker MD, 500 mg at 07/03/22 1613  •  sennosides-docusate (PERICOLACE) 8.6-50 MG per tablet 2 tablet, 2 tablet, Oral, BID, 2 tablet at 07/06/22 1406 **AND** polyethylene glycol (MIRALAX) packet 17 g, 17 g, Oral, Daily PRN, 17 g at 07/01/22 1249 **AND** bisacodyl (DULCOLAX) EC tablet 5 mg, 5 mg, Oral, Daily PRN, 5 mg at 06/29/22 1715 **AND** bisacodyl (DULCOLAX) suppository 10 mg, 10 mg, Rectal, Daily PRN, Charlene Castro MD  •  bumetanide (BUMEX) tablet 2 mg, 2 mg, Oral, Once per day on Sun Tue Thu SatXin Imran, MD, 2 mg at 07/05/22 0840  •  dextrose (D50W) (25 g/50 mL) IV injection 10-50 mL, 10-50 mL, Intravenous, Q15 Min PRN, Charlene Castro MD  •  dextrose (D50W) (25 g/50 mL) IV injection 25 g, 25 g, Intravenous, Q15 Min PRN, Jung Leonard MD  •  dextrose (GLUTOSE) oral gel 15 g, 15 g, Oral, Q15 Min PRN, Jung Leonard MD  •  epoetin hubert (EPOGEN,PROCRIT) injection 6,000 Units, 6,000 Units, Intravenous, Once per day on Mon Wed Fri, Ace Lauren MD, 6,000 Units at 07/06/22 1522  •  fluconazole (DIFLUCAN) tablet 200 mg, 200 mg, Oral, Once per day on Mon Wed Fri, 200 mg at 07/06/22 1406 **AND** fluconazole (DIFLUCAN) tablet 100 mg, 100 mg, Oral, Once per day on Sun Tue Thu Sat, Jerman Coffman MD, 100 mg at 07/05/22 0839  •  gabapentin (NEURONTIN) capsule 300 mg, 300 mg, Nasogastric, Daily, Perez Hooker  MD Evaristo, 300 mg at 07/06/22 1406  •  glucagon (human recombinant) (GLUCAGEN DIAGNOSTIC) injection 1 mg, 1 mg, Intramuscular, Q15 Min PRN, Jung Leonard MD  •  guaiFENesin (MUCINEX) 12 hr tablet 1,200 mg, 1,200 mg, Oral, Q12H, Perez Hooker MD, 1,200 mg at 07/06/22 1406  •  guaiFENesin (ROBITUSSIN) 100 MG/5ML oral solution 400 mg, 400 mg, Oral, Q6H PRN, Jerman Coffman MD  •  heparin (porcine) injection 4,000 Units, 4,000 Units, Intracatheter, PRN, Ace Lauren MD, 4,000 Units at 07/06/22 1331  •  hydrALAZINE (APRESOLINE) injection 10 mg, 10 mg, Intravenous, Q6H PRN, Jung Leonard MD, 10 mg at 07/01/22 0034  •  Insulin Aspart (novoLOG) injection 0-9 Units, 0-9 Units, Subcutaneous, TID AC, Perez Hooker MD, 2 Units at 07/06/22 1405  •  Insulin Aspart (novoLOG) injection 10 Units, 10 Units, Subcutaneous, 4x Daily, Perez Hooker MD, 10 Units at 07/06/22 1405  •  insulin detemir (LEVEMIR) injection 25 Units, 25 Units, Subcutaneous, Q12H, Perez Hooker MD, 25 Units at 07/06/22 0805  •  isosorbide mononitrate (IMDUR) 24 hr tablet 30 mg, 30 mg, Oral, Q24H, Jung Leonard MD, 30 mg at 07/06/22 1406  •  levothyroxine (SYNTHROID, LEVOTHROID) tablet 25 mcg, 25 mcg, Oral, Q AM, Perez Hooker MD, 25 mcg at 07/06/22 0612  •  linezolid (ZYVOX) tablet 600 mg, 600 mg, Oral, Q12H, Jerman Coffman MD, 600 mg at 07/06/22 1406  •  lisinopril (PRINIVIL,ZESTRIL) tablet 20 mg, 20 mg, Oral, Once per day on Sun Tue Thu Horacio Haney Lohita, MD, 20 mg at 07/05/22 0840  •  magnesium sulfate 4 gram infusion - Mg less than or equal to 1mg/dL, 4 g, Intravenous, PRN **OR** magnesium sulfate 3 gram infusion (1gm x 3) - Mg 1.1 - 1.5 mg/dL, 1 g, Intravenous, PRN **OR** Magnesium Sulfate 2 gram infusion- Mg 1.6 - 1.9 mg/dL, 2 g, Intravenous, PRN, Charlene Castro MD, Last Rate: 25 mL/hr at 07/01/22 1617, 2 g at 07/01/22 1617  •  metoprolol tartrate (LOPRESSOR) injection 5 mg, 5 mg, Intravenous, Q5 Min PRN,  Froylan Lu MD, 5 mg at 22 1921  •  metoprolol tartrate (LOPRESSOR) tablet 25 mg, 25 mg, Oral, Q12H, Froylan Lu MD, 25 mg at 22 1406  •  [] morphine injection 1 mg, 1 mg, Intravenous, Q4H PRN, 1 mg at 22 2026 **AND** naloxone (NARCAN) injection 0.4 mg, 0.4 mg, Intravenous, Q5 Min PRN, Charlene Castro MD  •  nystatin (MYCOSTATIN) powder, , Topical, Q12H, Perez Hooker MD, Given at 22 0806  •  pantoprazole (PROTONIX) injection 40 mg, 40 mg, Intravenous, Q24H, Charlene Castro MD, 40 mg at 22 1405  •  potassium chloride (MICRO-K) CR capsule 40 mEq, 40 mEq, Oral, PRN **OR** potassium chloride (KLOR-CON) packet 40 mEq, 40 mEq, Oral, PRN, 40 mEq at 22 1210 **OR** potassium chloride 10 mEq in 100 mL IVPB, 10 mEq, Intravenous, Q1H PRN, Huy Santa MD  •  [DISCONTINUED] sodium chloride 0.9 % infusion, 200 mL/hr, Intravenous, Continuous PRN, Stopped at 22 0130 **AND** potassium chloride 10 mEq in 100 mL IVPB, 10 mEq, Intravenous, Continuous PRN, Charlene Castro MD, Stopped at 22 0130  •  [DISCONTINUED] sodium chloride 0.45 % infusion, 150 mL/hr, Intravenous, Continuous PRN, Stopped at 22 0211 **AND** potassium chloride 10 mEq in 100 mL IVPB, 10 mEq, Intravenous, Continuous PRN, Charlene Castro MD, Stopped at 22 0212  •  sodium chloride 0.9 % flush 10 mL, 10 mL, Intravenous, Q12H, Jerman Coffman MD, 10 mL at 22 1408  •  sodium chloride 0.9 % flush 10 mL, 10 mL, Intravenous, Q12H, Jerman Coffman MD, 10 mL at 22 1408  •  sodium chloride 0.9 % flush 10 mL, 10 mL, Intravenous, Q12H, Jerman Coffman MD, 10 mL at 22 0805  •  sodium chloride 0.9 % flush 10 mL, 10 mL, Intravenous, PRN, Jerman Coffman MD  •  sodium chloride 0.9 % flush 20 mL, 20 mL, Intravenous, PRN, Jerman Coffman MD    Diagnoses/Assessment:     Ventilator associated pneumonia (HCC)    Hypertension    COPD (chronic obstructive pulmonary disease)  "(HCC)    Coronary artery disease involving native coronary artery of native heart without angina pectoris    Hypothyroidism    Acute cystitis with hematuria    Type 2 diabetes mellitus with autonomic neuropathy (HCC)    ESRD on hemodialysis (HCC)    Accidental drug overdose    Acute respiratory failure with hypoxia and hypercapnia (HCC)    Anemia    Positive fecal occult blood test    Yeast UTI      Treatment Plan:    Pt reports that she is doing well, denies depression. Continues to report overdose was accidental.  Spoke with spouse and he agrees that it was an accident and that when she is in better physical health he has no worry about her coming home.      Behavior health will sign off on this case, thank you for the opportunity. Please contact us for questions or any concerns.     Thank you     BRIDGETT Austin  22  15:44 CDT      Electronically signed by Jessica Triplett APRN at 22 1549     Ace Lauren MD at 22 0959          [facility]  [address] [place] [address]. [address]  T - [phone]  F - [phone]     Progress Note          PATIENT  DEMOGRAPHICS   PATIENT NAME: Yamileth Slater                      PHYSICIAN: Ace Lauren MD  : 1960  MRN: [id number]   LOS: 11 days    Patient Care Team:  Rianna Macias MD as PCP - General (Family Medicine)  Subjective   SUBJECTIVE   Doing better with therapy and now plan for discharge today         Objective   OBJECTIVE   Vital Signs  Temp:  [96.9 °F (36.1 °C)-97.3 °F (36.3 °C)] 97.2 °F (36.2 °C)  Heart Rate:  [64-81] 73  Resp:  [18] 18  BP: (117-149)/(55-76) 149/76    Flowsheet Rows      Flowsheet Row First Filed Value   Admission Height 157.5 cm (62\") Documented at 2022   Admission Weight 135 kg (297 lb) Documented at 2022             I/O last 3 completed shifts:  In: 960 [P.O.:660; IV Piggyback:300]  Out: -     PHYSICAL EXAM    Physical Exam  Constitutional:       " Appearance: She is well-developed. She is ill-appearing.   HENT:      Head: Normocephalic.   Eyes:      Pupils: Pupils are equal, round, and reactive to light.   Cardiovascular:      Rate and Rhythm: Normal rate and regular rhythm.      Heart sounds: Normal heart sounds.   Pulmonary:      Effort: Pulmonary effort is normal.      Breath sounds: Normal breath sounds.   Abdominal:      General: Bowel sounds are normal.      Palpations: Abdomen is soft.   Musculoskeletal:         General: No swelling.   Skin:     Coloration: Skin is not jaundiced.   Neurological:      Deep Tendon Reflexes: Reflexes normal.         RESULTS   Results Review:    Results from last 7 days   Lab Units 07/06/22  0551 07/05/22  0601 07/04/22  0646   SODIUM mmol/L 132* 132* 128*   POTASSIUM mmol/L 4.6 3.5 4.2   CHLORIDE mmol/L 93* 92* 89*   CO2 mmol/L 24.0 27.0 23.0   BUN mg/dL 40* 30* 60*   CREATININE mg/dL 4.99* 3.69* 5.65*   CALCIUM mg/dL 9.2 9.7 9.2   BILIRUBIN mg/dL <0.2 0.2 0.2   ALK PHOS U/L 220* 155* 198*   ALT (SGPT) U/L 5 <5 <5   AST (SGOT) U/L 14 10 10   GLUCOSE mg/dL 319* 161* 383*       Estimated Creatinine Clearance: 14.4 mL/min (A) (by C-G formula based on SCr of 4.99 mg/dL (H)).    Results from last 7 days   Lab Units 07/03/22  0445 07/02/22  0334 07/01/22  0558   MAGNESIUM mg/dL 2.6* 2.6* 1.9             Results from last 7 days   Lab Units 07/06/22  0551 07/05/22  0601 07/04/22  0646 07/03/22  0445 07/02/22  0334   WBC 10*3/mm3 8.99 8.98 8.81 10.15 11.53*   HEMOGLOBIN g/dL 10.0* 9.8* 9.7* 9.6* 9.4*   PLATELETS 10*3/mm3 262 276 302 337 259               Imaging Results (Last 24 Hours)     ** No results found for the last 24 hours. **           MEDICATIONS    bumetanide, 2 mg, Oral, Once per day on Sun Tue Thu Sat  epoetin hubert/hubert-epbx, 6,000 Units, Intravenous, Once per day on Mon Wed Fri  fluconazole, 200 mg, Oral, Once per day on Mon Wed Fri   And  fluconazole, 100 mg, Oral, Once per day on Sun Tue Thu Sat  gabapentin, 300  mg, Nasogastric, Daily  guaiFENesin, 1,200 mg, Oral, Q12H  Insulin Aspart, 0-9 Units, Subcutaneous, TID AC  Insulin Aspart, 10 Units, Subcutaneous, 4x Daily  insulin detemir, 25 Units, Subcutaneous, Q12H  isosorbide mononitrate, 30 mg, Oral, Q24H  levothyroxine, 25 mcg, Oral, Q AM  linezolid, 600 mg, Oral, Q12H  lisinopril, 20 mg, Oral, Once per day on Sun Tue Thu Sat  metoprolol tartrate, 25 mg, Oral, Q12H  nystatin, , Topical, Q12H  pantoprazole, 40 mg, Intravenous, Q24H  senna-docusate sodium, 2 tablet, Oral, BID  sodium chloride, 10 mL, Intravenous, Q12H  sodium chloride, 10 mL, Intravenous, Q12H  sodium chloride, 10 mL, Intravenous, Q12H      potassium chloride, 10 mEq, Last Rate: Stopped (06/26/22 0130)  potassium chloride, 10 mEq, Last Rate: Stopped (06/26/22 0212)        Assessment & Plan   ASSESSMENT / PLAN      Ventilator associated pneumonia (HCC)    Hypertension    COPD (chronic obstructive pulmonary disease) (HCC)    Coronary artery disease involving native coronary artery of native heart without angina pectoris    Hypothyroidism    Acute cystitis with hematuria    Type 2 diabetes mellitus with autonomic neuropathy (HCC)    ESRD on hemodialysis (HCC)    Accidental drug overdose    Acute respiratory failure with hypoxia and hypercapnia (HCC)    Anemia    Positive fecal occult blood test    Yeast UTI    1.ESRD dependent on hemodialysis since October 2021.  Has tunnel catheter. Patient is on dialysis MWF at [facility]. Lost weight from 297 to 265 lbs and now on bumex po non dialysis days.     2. Anemia chronic kidney disease.  Patient had extensive work-up in the past by Dr. Munoz. She also has B12 deficiency.  She has history of AVM.  She received 2 units of packed RBCs. Now on epogen 6000 units. hgb between 9-10     3. htn - runs low with hd. Lisinopril on non dialysis days. Bp is stable     4. Possible PNA/Chronic Resp Failure related to COPD-now extubated 7/1/22. She is MRSA screen  positive.Patient is on home oxygen and trilogy when sleeping.  On zyvox. On pureed diet and may remove NG tube today      5. Possible OD with flexeril- rec'd activated charcoal per g tube     6. DM- Glucose was 605 on arrival- management per primary team.      7.coronary artery disease     8.chronic kidney disease/mineral and bone disorder on sevelamer    Plan for possible discharge today after dialysis                 This document has been electronically signed by Ace Lauren MD on 2022 09:59 CDT           Electronically signed by Ace Lauren MD at 22 1000     Perez Hooker MD at 22 0808     Attestation signed by Kit Brar MD at 22 1334    I have seen and evaluated the patient.  I have discussed the case with the resident. I have reviewed the notes, assessment and plan, and/or procedures performed by the resident. I concur with the resident’s documentation.       NO overnight events. She is feeling better, breathing has improving, tolerating PO intake. Currently getting dialysis.    Physical Exam:  General: NAD.  CV: S1 and S2 normal. RRR.  Pulmonary: Diminished breath sounds present bilaterally.   Abdomen: Bowel sounds present and normal.  Abdomen is soft, and nontender.  Extremities: No lower extremity edema.     Plan: Continue PO Zyvox, continue insulin regimen for diabetes control. Upon chart review, patient had been denied several NH placement d/t insurance issues. She had requested home with home health. Will follow up therapy recs and can anticipate discharge in the next day or so provided patient has good home support.       This document has been electronically signed by Kit Brar MD on 2022 13:34 CDT                        FAMILY MEDICINE RESIDENCY SERVICE  DAILY PROGRESS NOTE    NAME: Yamileth Slater  : 1960  MRN: [id number]      LOS: 11 days     PROVIDER OF SERVICE: Perez Hooker MD    Chief Complaint: Ventilator  associated pneumonia (HCC)    Subjective:     Interval History:  History taken from: patient chart  Sitting up in chair eating breakfast. No new complaints. No soa. Still has fatigue and weakness. Still reports productive cough.     Review of Systems:   Review of Systems   Constitutional: Positive for fatigue. Negative for fever.   Respiratory: Positive for cough. Negative for shortness of breath.    Cardiovascular: Negative for chest pain and leg swelling.   Gastrointestinal: Negative for abdominal pain, nausea and vomiting.   Genitourinary: Negative for dysuria.   Neurological: Positive for weakness.       Objective:     Vital Signs  Temp:  [96.9 °F (36.1 °C)-97.3 °F (36.3 °C)] 97.2 °F (36.2 °C)  Heart Rate:  [64-81] 73  Resp:  [18] 18  BP: (117-149)/(55-76) 149/76  Flow (L/min):  [2] 2   Body mass index is 49.09 kg/m².    Physical Exam  Physical Exam  Vitals reviewed.   Constitutional:       General: She is not in acute distress.     Appearance: She is morbidly obese.      Interventions: Nasal cannula in place.      Comments: 1L NC   HENT:      Head: Normocephalic and atraumatic.   Eyes:      Conjunctiva/sclera: Conjunctivae normal.   Cardiovascular:      Rate and Rhythm: Normal rate and regular rhythm.      Pulses: Normal pulses.      Heart sounds: Normal heart sounds.   Pulmonary:      Effort: Pulmonary effort is normal. No respiratory distress.      Breath sounds: No rhonchi or rales.   Abdominal:      Palpations: Abdomen is soft.      Tenderness: There is no abdominal tenderness.   Musculoskeletal:      Right lower leg: No edema.      Left lower leg: No edema.   Skin:     General: Skin is warm.   Neurological:      Mental Status: She is alert. Mental status is at baseline.   Psychiatric:         Mood and Affect: Mood normal.         Behavior: Behavior normal.         Scheduled Meds   bumetanide, 2 mg, Oral, Once per day on Sun Tue Thu Sat  epoetin hubert/hubert-epbx, 6,000 Units, Intravenous, Once per day on Mon    fluconazole, 200 mg, Oral, Once per day on    And  fluconazole, 100 mg, Oral, Once per day on Sun Tue Thu Sat  gabapentin, 300 mg, Nasogastric, Daily  guaiFENesin, 1,200 mg, Oral, Q12H  Insulin Aspart, 0-9 Units, Subcutaneous, TID AC  Insulin Aspart, 10 Units, Subcutaneous, 4x Daily  insulin detemir, 25 Units, Subcutaneous, Q12H  isosorbide mononitrate, 30 mg, Oral, Q24H  levothyroxine, 25 mcg, Oral, Q AM  linezolid, 600 mg, Oral, Q12H  lisinopril, 20 mg, Oral, Once per day on Sun Tue Thu Sat  metoprolol tartrate, 25 mg, Oral, Q12H  nystatin, , Topical, Q12H  pantoprazole, 40 mg, Intravenous, Q24H  senna-docusate sodium, 2 tablet, Oral, BID  sodium chloride, 10 mL, Intravenous, Q12H  sodium chloride, 10 mL, Intravenous, Q12H  sodium chloride, 10 mL, Intravenous, Q12H       PRN Meds   •  acetaminophen  •  senna-docusate sodium **AND** polyethylene glycol **AND** bisacodyl **AND** bisacodyl  •  dextrose  •  dextrose  •  dextrose  •  glucagon (human recombinant)  •  guaiFENesin  •  heparin (porcine)  •  hydrALAZINE  •  magnesium sulfate **OR** magnesium sulfate in D5W 1g/100mL (PREMIX) **OR** magnesium sulfate  •  metoprolol tartrate  •  [] Morphine **AND** naloxone  •  potassium chloride **OR** potassium chloride **OR** potassium chloride  •  [DISCONTINUED] sodium chloride **AND** potassium chloride  •  [DISCONTINUED] sodium chloride **AND** potassium chloride  •  sodium chloride  •  sodium chloride      Diagnostic Data    Results from last 7 days   Lab Units 22  0551   WBC 10*3/mm3 8.99   HEMOGLOBIN g/dL 10.0*   HEMATOCRIT % 32.2*   PLATELETS 10*3/mm3 262   GLUCOSE mg/dL 319*   CREATININE mg/dL 4.99*   BUN mg/dL 40*   SODIUM mmol/L 132*   POTASSIUM mmol/L 4.6   AST (SGOT) U/L 14   ALT (SGPT) U/L 5   ALK PHOS U/L 220*   BILIRUBIN mg/dL <0.2   ANION GAP mmol/L 15.0       No radiology results for the last day      I reviewed the patient's new clinical results.    Assessment/Plan:      Active Hospital Problems    Diagnosis  POA   • **Ventilator associated pneumonia (HCC) [J95.851]  Yes     Priority: High     Procal slightly improved   CRP improved, repeat q48 hrs  Blood cx negative   respiratory culture positive for MRSA  On linezolid switched to PO     • Type 2 diabetes mellitus with autonomic neuropathy (HCC) [E11.43]  Yes     Priority: Medium     On moderate SSI  On levemir 25u bid  On 10u QID     • Yeast UTI [B37.49]  Unknown     Ur cx showed yeast isolate  On diflucan started 7/5/22, therapy for 14 days     • Positive fecal occult blood test [R19.5]  Unknown     Hgb stable  GI consulted and appreciate recs     • Accidental drug overdose [T50.901A]  Yes     Family reports of 10 tablets of 10 mg of Flexeril ingested.  - Overdose labs obtained including UDS, acetaminophen, salicylate, ethanol level  - Poison control notified they Recommend symptomatic care  - Monitor Qtc interval changes, improved  - Psych consulted for concern for depression, appreciate recs       • Acute respiratory failure with hypoxia and hypercapnia ([facility]) [J96.01, J96.02]  Yes     On home oxygen 2L  Home Trilogy at night     • Anemia [D64.9]  Yes     Hgb 6.9 on admission   - received 2u PRBC  - Type and screen   - Monitor H &H   -- H&H stable      • ESRD on hemodialysis ([facility]) [N18.6, Z99.2]  Not Applicable     -Receives hemodialysis MWF at [facility] in [place]  Missed dialysis per family prior to arrival   Nephrology consulted appreciated recs          • Acute cystitis with hematuria [N30.01]  Unknown     Ur culture showed yeast isolate  On diflucan started 7/5/22, needs therapy for 14 days  Full dose on HD days after HD  1/2 dose on non-HD days     • Hypothyroidism [E03.9]  Yes     -home synthroid 25 mcg     • Hypertension [I10]  Yes     On home metoprolol and imdur  Lisinopril on non-HD days  BP stable  - PRN Hydralazine       • Coronary artery disease involving native coronary artery of native heart without  angina pectoris [I25.10]  Yes     - S/P CABG, Bilateral carotid artery stenosis w/ 2 stents on left side,  PAD (peripheral artery disease) with stent in right upper leg  - Cardiology on board       • COPD (chronic obstructive pulmonary disease) ([facility]) [J44.9]  Yes     On home oxygen 2-3L NC  Pt supplied trilogy at night         DVT prophylaxis: SCDs/TEDs  Code status is   Code Status and Medical Interventions:   Ordered at: 06/25/22 4980     Level Of Support Discussed With:    Patient     Code Status (Patient has no pulse and is not breathing):    CPR (Attempt to Resuscitate)     Medical Interventions (Patient has pulse or is breathing):    Full Support       Plan for disposition:Where: SNF and When:  1-2days      Time: 30 mins      This document has been electronically signed by Perez Hooker MD on July 6, 2022 08:08 CDT      Electronically signed by Kit Brar MD at 07/06/22 5394     Mingo Duarte MD at 07/05/22 0082                  SUBJECTIVE:   7/5/2022  Chief Complaint:     Subjective      Patient feels better today.  Denied abdominal pain, nausea or vomiting.  Hemoglobin is 9.8.  Ambulating with PT.  Tolerating soft diet.  Had psychiatry eval    History:  Past Medical History:   Diagnosis Date   • Acute blood loss anemia 4/16/2017    Likely due to gastric oozing at this time. - Dr. Duarte (GI) was consulted and has now signed off, will follow up outpatient - pill colonoscopy showed AVMs - continue to monitor   • Acute cystitis with hematuria 3/31/2021    1/13: IV Rocephin 1 gm q 24 1/14 : transitioned  to omnicef 300mg. Urine cultures resulted and did not show growth. Omnicef discontinued as patient is asymptomatic   • Altered mental status 1/9/2022    - AMS on presentation - initial ABG pH 7.3, CO2 34 - Procal 0.29 - UA negative for acute cysitits -CTA head wnl  - Empiric Zosyn and Vancomycin -Lactate 2.5 on admission  - blood cultures no growth at 24 hours     • Anxiety    • CAD  (coronary artery disease) 4/24/2021    S/P 3 stents 5/1/2021 for [facility] Continue ASA 81mg & Clopidogrel 75mg Continue Atorvastatin 40mg   • Carotid artery stenosis    • Chronic obstructive lung disease ([facility])    • CKD (chronic kidney disease) stage 4, GFR 15-29 ml/min ([facility])    • CKD (chronic kidney disease), symptom management only, stage 5 ([facility]) 10/5/2020    Results from last 7 days Lab Units 12/15/21 0548 12/14/21 1323 12/14/21 0916 CREATININE mg/dL 3.92* 3.21* 3.32*  Baseline creatinine 2-3 GFR 13-25 GFR 15 Dialysis MWF, sees Dr. Lauren Nephrology consult,, appreciate recommendations Continue Bumex 1mg bid daily Holding Bumex 2mg 4 times a week   • Colonic polyp    • Coronary arteriosclerosis    • Diabetes mellitus ([facility])    • Diabetic neuropathy ([facility])    • Ear pain, right 10/18/2021    - canal trauma due to patient scratching and DMT2 - added cortisporin ear drops   • Elevated troponin 10/12/2021    -most likely from CKD -Trending down -Neg chest pain   • Generalized abdominal pain 7/1/2022    Could be due to initiation of tube feeds vs dyspepsia vs abdominal cramps related to no PO intake due to intubation vs constipation Continue current laxative regiment  If no bowel movement by this afternoon will consider enema   • GERD (gastroesophageal reflux disease)    • GI bleed 5/13/2021    - GI will follow up outpatient - Protonix 40mg daily - Avoid medical DVT prophy and use mechanical at this time instead. - Continue to monitor - pill colonoscopy results showed AVMs   • History of transfusion    • Hypercholesterolemia    • Hyperosmolar hyperglycemic state (HHS) ([facility]) 6/25/2022    Serum glucose 605 on admission  Anion gap 16 PH 7.37 Bicarb 27.9 Continue fluids  Insulin drip with HHS protocol  Anion gap closed around 10 AM, received one dose of Levamir subq, will stop insulin drip after 2 hrs     • Hypertension    • Hypokalemia 5/27/2022    Will replace as needed. Will be cautious in the setting of ESRD to avoid need for  emergency dialysis   Monitor Qtc intervals on EKG     • Hypomagnesemia 6/27/2021    Monitor and replace   • Morbid obesity (Formerly [facility])    • Nephrolithiasis    • On mechanically assisted ventilation (Formerly [facility]) 6/26/2022    Vent management and sedation orders placed.  - [facility] intensivist group consulted for vent management appreciate recommendations  - plan to extubate today     • Peripheral vascular disease (Formerly [facility])    • Pleural effusion on right 6/26/2022    CXR on 6/30/22 read as a small upper left pulmonary edema vs early pneumonia.  Last Echo 1/2022 EF 61-65 % Continue to monitor  Procal slightly improved, CRP improved On Linezolid and meropenum      • SIRS (systemic inflammatory response syndrome) (Formerly [facility]) 1/9/2022    Admission  - WBC 17.78   -   - RR 16 - 1/10: VSS/wnl - CXR - Mild pulm edema - Blood cultures no growth at 24 hours  - Procalcitonin 0.29 - UA : glucose 1000, negative Leucocytes/nitrate - Empiric Zosyn and Vancomcyin    • Sleep apnea    • Substance abuse (Formerly [facility])    • Vitamin D deficiency      Past Surgical History:   Procedure Laterality Date   • CARDIAC CATHETERIZATION N/A 7/14/2020   • CARDIAC CATHETERIZATION N/A 4/23/2021    Procedure: Left Heart Cath;  Surgeon: Melba Romo MD;  Location: [facility] INVASIVE LOCATION;  Service: Cardiology;  Laterality: N/A;   • CARDIAC CATHETERIZATION N/A 4/30/2021    Procedure: Percutaneous Coronary Intervention;  Surgeon: Russell Voss MD;  Location: [facility] CATH INVASIVE LOCATION;  Service: Cardiovascular;  Laterality: N/A;   • CARDIAC CATHETERIZATION N/A 4/30/2021    Procedure: Stent NIKKI coronary;  Surgeon: Russell Voss MD;  Location: [facility] CATH INVASIVE LOCATION;  Service: Cardiovascular;  Laterality: N/A;   • CARDIAC CATHETERIZATION Left 11/13/2021    Procedure: Left Heart Cath;  Surgeon: Niall Rios MD;  Location: [facility] INVASIVE LOCATION;  Service: Cardiology;  Laterality: Left;   • CAROTID STENT Left    • COLONOSCOPY      • COLONOSCOPY N/A 5/14/2021    Procedure: COLONOSCOPY;  Surgeon: Mingo Duarte MD;  Location: [facility] ENDOSCOPY;  Service: Gastroenterology;  Laterality: N/A;   • CORONARY ARTERY BYPASS GRAFT N/A 2013    CABG X 3   • CYSTOSCOPY BLADDER STONE LITHOTRIPSY Bilateral    • ENDOSCOPY N/A 4/12/2021    Procedure: ESOPHAGOGASTRODUODENOSCOPY;  Surgeon: Mingo Duarte MD;  Location: [facility] ENDOSCOPY;  Service: Gastroenterology;  Laterality: N/A;   • ENDOSCOPY N/A 5/14/2021    Procedure: ESOPHAGOGASTRODUODENOSCOPY;  Surgeon: Mingo Duarte MD;  Location: [facility] ENDOSCOPY;  Service: Gastroenterology;  Laterality: N/A;   • ENDOSCOPY N/A 1/28/2022    Procedure: ESOPHAGOGASTRODUODENOSCOPY;  Surgeon: Mingo Duarte MD;  Location: [facility] ENDOSCOPY;  Service: Gastroenterology;  Laterality: N/A;   • INTERVENTIONAL RADIOLOGY PROCEDURE N/A 10/21/2021    Procedure: tunneled central venous catheter placement;  Surgeon: Donnie Robles MD;  Location: [facility] INVASIVE LOCATION;  Service: Interventional Radiology;  Laterality: N/A;   • INTERVENTIONAL RADIOLOGY PROCEDURE N/A 1/27/2022    Procedure: tunneled central venous catheter placement;  Surgeon: Donnie Robles MD;  Location: [facility] INVASIVE LOCATION;  Service: Interventional Radiology;  Laterality: N/A;     Family History   Problem Relation Age of Onset   • Heart disease Mother    • Lung cancer Mother    • Heart disease Father    • Heart attack Father    • Diabetes Father    • Heart disease Half-Sister         Dad's side   • Heart disease Brother    • No Known Problems Sister    • No Known Problems Sister    • No Known Problems Sister    • No Known Problems Sister    • No Known Problems Sister    • Pancreatic cancer Half-Sister         Dad's side   • No Known Problems Brother    • No Known Problems Brother    • Heart attack Half-Brother    • Heart attack Half-Brother    • No Known Problems Maternal Grandmother    • No Known Problems Maternal  Grandfather    • No Known Problems Paternal Grandmother    • No Known Problems Paternal Grandfather      Social History     Tobacco Use   • Smoking status: Former Smoker     Packs/day: 0.25     Years: 46.00     Pack years: 11.50     Types: Cigarettes     Start date: 1999     Quit date: 2/15/2022     Years since quittin.3   • Smokeless tobacco: Never Used   • Tobacco comment: only smoking 5 a day - quit 2021   Substance Use Topics   • Alcohol use: No   • Drug use: Not Currently     Types: LSD, Marijuana, Methamphetamines     Medications Prior to Admission   Medication Sig Dispense Refill Last Dose   • acetaminophen (Tylenol 8 Hour) 650 MG 8 hr tablet Take 1 tablet by mouth Every 8 (Eight) Hours As Needed for Mild Pain . 90 tablet 0    • albuterol (PROVENTIL) (2.5 MG/3ML) 0.083% nebulizer solution Inhale the contents of 1 vial by nebulization Every 4 (Four) Hours As Needed for Wheezing. 75 mL 3    • albuterol sulfate  (90 Base) MCG/ACT inhaler Inhale 2 puffs Every 4 (Four) Hours As Needed for Wheezing. 18 g 1    • aspirin (aspirin) 81 MG EC tablet Take 1 tablet by mouth Daily. 60 tablet 5    • atorvastatin (LIPITOR) 20 MG tablet Take 1 tablet by mouth every night at bedtime. 90 tablet 0    • Blood Glucose Monitoring Suppl ([employer] Blood Glucose Meter) w/Device kit 1 each 3 (Three) Times a Day. 1 kit 3    • bumetanide (BUMEX) 2 MG tablet Take 1 tablet by mouth 4 (Four) Times a Week. Take on non-hemodialysis days. 16 tablet 0    • Capsaicin 0.035 % cream Apply 3 g topically 3 (Three) Times a Day As Needed (ankle pain). 42.5 g 2    • Cetirizine HCl 10 MG capsule Take 1 capsule by mouth Daily.      • clopidogrel (PLAVIX) 75 MG tablet Take 1 tablet by mouth Daily. 30 tablet 5    • Continuous Blood Gluc  (FreeStyle Jade 2 [place]) device 1 each Continuous. 1 each 0    • Continuous Blood Gluc Sensor (FreeStyle Jade 2 Sensor) misc 1 each Every 14 (Fourteen) Days. 2 each 11    • cyclobenzaprine  "(FLEXERIL) 5 MG tablet Take 2 tablets by mouth At Night As Needed for Muscle Spasms. 90 tablet 0    • Diclofenac Sodium (VOLTAREN) 1 % gel gel Apply 4 g topically to the appropriate area as directed 4 (Four) Times a Day As Needed (Ankle pain). 350 g 2    • DULoxetine (CYMBALTA) 20 MG capsule Take 2 capsules by mouth Daily for 90 days. 180 capsule 0    • Easy Touch Insulin Syringe 30G X 5/16\" 0.5 ML misc USE AS DIRECTED WITH LEVEMIR      • EASY TOUCH PEN NEEDLES 31G X 8 MM misc       • gabapentin (NEURONTIN) 300 MG capsule Take 1 capsule by mouth Daily. And an extra pill M/W/F - dialysis days 45 capsule 2    • glucose blood test strip Use as instructed 100 each 12    • insulin detemir (LEVEMIR) 100 UNIT/ML injection Inject 25 Units under the skin into the appropriate area as directed Every Night. 3 mL 12    • Insulin Lispro, 1 Unit Dial, (HUMALOG) 100 UNIT/ML solution pen-injector Inject 12 Units under the skin into the appropriate area as directed 3 (Three) Times a Day With Meals. 30 mL 12    • Insulin Pen Needle (NovoFine) 30G X 8 MM misc As directed 4 times daily 100 each 11    • Insulin Pen Needle 31G X 8 MM misc Use to inject insulin 4 (Four) Times a Day as directed. 120 each 12    • ipratropium-albuterol (DUO-NEB) 0.5-2.5 mg/3 ml nebulizer Take 3 mL by nebulization 4 (Four) Times a Day.      • isosorbide mononitrate (IMDUR) 30 MG 24 hr tablet Take 1 tablet by mouth Every Morning. 90 tablet 1    • levothyroxine (SYNTHROID, LEVOTHROID) 25 MCG tablet Take 25 mcg by mouth Daily.      • lisinopril (PRINIVIL,ZESTRIL) 20 MG tablet Take 1 tablet by mouth Daily. 90 tablet 0    • Methoxy PEG-Epoetin Beta (MIRCERA IJ) 150 mcg Every 14 (Fourteen) Days.      • Methoxy PEG-Epoetin Beta (MIRCERA IJ) 225 mcg Every 14 (Fourteen) Days.      • metoprolol tartrate (LOPRESSOR) 25 MG tablet Take 1 tablet by mouth Every 12 (Twelve) Hours. 180 tablet 0    • montelukast (SINGULAIR) 10 MG tablet Take 1 tablet by mouth Every Night. 90 " tablet 0    • muscle rub (BenGay) 10-15 % cream cream Apply 1 application topically to the appropriate area as directed 4 (Four) Times a Day As Needed for buttock pain. 85 g 1    • nitroglycerin (NITROSTAT) 0.4 MG SL tablet Place 0.4 mg under the tongue Every 5 (Five) Minutes As Needed for Chest Pain (x 3 doses).      • O2 (OXYGEN) Inhale 3 L/min Continuous.      • ondansetron ODT (Zofran ODT) 4 MG disintegrating tablet Place 1 tablet on the tongue Every 8 (Eight) Hours As Needed for Nausea or Vomiting. 30 tablet 0    • oxybutynin XL (DITROPAN-XL) 5 MG 24 hr tablet Take 1 tablet by mouth Daily. 90 tablet 0    • pantoprazole (PROTONIX) 40 MG EC tablet Take 1 tablet by mouth Every Night. 90 tablet 0    • promethazine (PHENERGAN) 25 MG suppository Insert  into the rectum Every 6 (Six) Hours.      • ranolazine (RANEXA) 500 MG 12 hr tablet Take 1 tablet by mouth Every 12 (Twelve) Hours. 180 tablet 0    • Semaglutide (Rybelsus) 7 MG tablet Take 7 mg by mouth Daily. 90 tablet 0    • sevelamer (RENVELA) 800 MG tablet Take 800 mg by mouth 3 (Three) Times a Day With Meals.      • sodium bicarbonate 650 MG tablet Take 1 tablet by mouth.        Allergies:  Adhesive tape, Latex, Nsaids, and Other     CURRENT MEDICATIONS/OBJECTIVE/VS/PE:     Current Medications:     Current Facility-Administered Medications   Medication Dose Route Frequency Provider Last Rate Last Admin   • acetaminophen (TYLENOL) tablet 500 mg  500 mg Oral Q6H PRN Perez Hooker MD   500 mg at 07/03/22 1613   • sennosides-docusate (PERICOLACE) 8.6-50 MG per tablet 2 tablet  2 tablet Oral BID Charlene Castro MD   2 tablet at 07/03/22 2010    And   • polyethylene glycol (MIRALAX) packet 17 g  17 g Oral Daily PRN Charlene Castro MD   17 g at 07/01/22 1249    And   • bisacodyl (DULCOLAX) EC tablet 5 mg  5 mg Oral Daily PRN Charlene Castro MD   5 mg at 06/29/22 1715    And   • bisacodyl (DULCOLAX) suppository 10 mg  10 mg Rectal Daily PRN Gloria,  MD Charlene       • bumetanide (BUMEX) tablet 2 mg  2 mg Oral Once per day on Sun Tue Thu Ace Ng MD   2 mg at 07/05/22 0840   • dextrose (D50W) (25 g/50 mL) IV injection 10-50 mL  10-50 mL Intravenous Q15 Min PRN Charlene Castro MD       • dextrose (D50W) (25 g/50 mL) IV injection 25 g  25 g Intravenous Q15 Min PRN Jung Leonard MD       • dextrose (GLUTOSE) oral gel 15 g  15 g Oral Q15 Min PRN Jung Leonard MD       • epoetin hubert (EPOGEN,PROCRIT) injection 6,000 Units  6,000 Units Intravenous Once per day on Mon Wed Fri Ace Lauren MD   6,000 Units at 07/04/22 1231   • [START ON 7/6/2022] fluconazole (DIFLUCAN) tablet 200 mg  200 mg Oral Once per day on Mon Wed Fri Jerman Coffman MD        And   • fluconazole (DIFLUCAN) tablet 100 mg  100 mg Oral Once per day on Sun Tue Thu Sat Jerman Coffman MD   100 mg at 07/05/22 0839   • gabapentin (NEURONTIN) capsule 300 mg  300 mg Nasogastric Daily Perez Hooker MD   300 mg at 07/05/22 0840   • glucagon (human recombinant) (GLUCAGEN DIAGNOSTIC) injection 1 mg  1 mg Intramuscular Q15 Min PRN Jung Leonard MD       • guaiFENesin (MUCINEX) 12 hr tablet 1,200 mg  1,200 mg Oral Q12H Perez Hooker MD   1,200 mg at 07/05/22 2051   • guaiFENesin (ROBITUSSIN) 100 MG/5ML oral solution 400 mg  400 mg Oral Q6H PRN Jerman Coffman MD       • heparin (porcine) injection 4,000 Units  4,000 Units Intracatheter PRN Ace Lauren MD   4,000 Units at 07/04/22 1332   • hydrALAZINE (APRESOLINE) injection 10 mg  10 mg Intravenous Q6H PRN Jung Leonard MD   10 mg at 07/01/22 0034   • Insulin Aspart (novoLOG) injection 0-9 Units  0-9 Units Subcutaneous TID AC Perez Hooker MD   6 Units at 07/05/22 1650   • Insulin Aspart (novoLOG) injection 8 Units  8 Units Subcutaneous 4x Daily Perez Hooker MD   8 Units at 07/05/22 2054   • insulin detemir (LEVEMIR) injection 25 Units  25 Units Subcutaneous Q12H Perez Hooker MD   25 Units at  07/05/22 2053   • isosorbide mononitrate (IMDUR) 24 hr tablet 30 mg  30 mg Oral Q24H Jung Leonard MD   30 mg at 07/05/22 0846   • levothyroxine (SYNTHROID, LEVOTHROID) tablet 25 mcg  25 mcg Oral Q AM Perez Hooker MD   25 mcg at 07/05/22 0840   • linezolid (ZYVOX) tablet 600 mg  600 mg Oral Q12H Jerman Coffman MD   600 mg at 07/05/22 2051   • lisinopril (PRINIVIL,ZESTRIL) tablet 20 mg  20 mg Oral Once per day on Sun Tue Thu Sat Jung Leonard MD   20 mg at 07/05/22 0840   • magnesium sulfate 4 gram infusion - Mg less than or equal to 1mg/dL  4 g Intravenous PRN Charlene Castro MD        Or   • magnesium sulfate 3 gram infusion (1gm x 3) - Mg 1.1 - 1.5 mg/dL  1 g Intravenous PRN Charlene Castro MD        Or   • Magnesium Sulfate 2 gram infusion- Mg 1.6 - 1.9 mg/dL  2 g Intravenous PRN Charlene Castro MD 25 mL/hr at 07/01/22 1617 2 g at 07/01/22 1617   • metoprolol tartrate (LOPRESSOR) injection 5 mg  5 mg Intravenous Q5 Min PRN Froylan Lu MD   5 mg at 06/26/22 1921   • metoprolol tartrate (LOPRESSOR) tablet 25 mg  25 mg Oral Q12H Froylan Lu MD   25 mg at 07/05/22 2051   • naloxone (NARCAN) injection 0.4 mg  0.4 mg Intravenous Q5 Min PRN Charlene Castro MD       • nystatin (MYCOSTATIN) powder   Topical Q12H Perez Hooker MD   Given at 07/05/22 2052   • pantoprazole (PROTONIX) injection 40 mg  40 mg Intravenous Q24H Charlene Castro MD   40 mg at 07/05/22 0842   • potassium chloride (MICRO-K) CR capsule 40 mEq  40 mEq Oral PRN Huy Santa MD        Or   • potassium chloride (KLOR-CON) packet 40 mEq  40 mEq Oral PRN Huy Santa MD   40 mEq at 07/02/22 1210    Or   • potassium chloride 10 mEq in 100 mL IVPB  10 mEq Intravenous Q1H PRN Huy Santa MD       • potassium chloride 10 mEq in 100 mL IVPB  10 mEq Intravenous Continuous PRN Charlene Castro MD   Stopped at 06/26/22 0130   • potassium chloride 10 mEq in 100 mL IVPB  10 mEq Intravenous Continuous PRN  Charlene Castro MD   Stopped at 06/26/22 0212   • sodium chloride 0.9 % flush 10 mL  10 mL Intravenous Q12H Jerman Coffman MD   10 mL at 07/05/22 2053   • sodium chloride 0.9 % flush 10 mL  10 mL Intravenous Q12H Jerman Coffman MD   10 mL at 07/05/22 2053   • sodium chloride 0.9 % flush 10 mL  10 mL Intravenous Q12H Jerman Coffman MD   10 mL at 07/05/22 2053   • sodium chloride 0.9 % flush 10 mL  10 mL Intravenous PRN Jerman Coffman MD       • sodium chloride 0.9 % flush 20 mL  20 mL Intravenous PRN Jerman Coffman MD           Objective     Review of Systems:   Review of Systems   Constitutional: Negative for chills, fatigue, fever and unexpected weight change.   HENT: Negative for congestion, ear discharge, hearing loss, nosebleeds and sore throat.    Eyes: Negative for pain, discharge and redness.   Respiratory: Negative for cough, chest tightness, shortness of breath and wheezing.    Cardiovascular: Negative for chest pain and palpitations.   Gastrointestinal: Negative for abdominal distention, abdominal pain, blood in stool, constipation, diarrhea, nausea and vomiting.   Endocrine: Negative for cold intolerance, polydipsia, polyphagia and polyuria.   Genitourinary: Negative for dysuria, flank pain, frequency, hematuria and urgency.   Musculoskeletal: Negative for arthralgias, back pain, joint swelling and myalgias.   Skin: Negative for color change, pallor and rash.   Neurological: Negative for tremors, seizures, syncope, weakness and headaches.   Hematological: Negative for adenopathy. Does not bruise/bleed easily.   Psychiatric/Behavioral: Negative for behavioral problems, confusion, dysphoric mood, hallucinations and suicidal ideas. The patient is not nervous/anxious.        Physical Exam:   Temp:  [95.9 °F (35.5 °C)-97.3 °F (36.3 °C)] 97 °F (36.1 °C)  Heart Rate:  [64-81] 81  Resp:  [18-20] 18  BP: (110-142)/(55-64) 117/55     Physical Exam:  General Appearance:    Alert, cooperative, in no acute  distress   Head:    Normocephalic, without obvious abnormality, atraumatic   Eyes:            Lids and lashes normal, conjunctivae and sclerae normal, no   icterus, no pallor, corneas clear, PERRLA   Ears:    Ears appear intact with no abnormalities noted   Throat:   No oral lesions, no thrush, oral mucosa moist   Neck:   No adenopathy, supple, trachea midline, no thyromegaly, no     carotid bruit, no JVD   Back:     No kyphosis present, no scoliosis present, no skin lesions,       erythema or scars, no tenderness to percussion or                   palpation,   range of motion normal   Lungs:     Clear to auscultation,respirations regular, even and                   unlabored    Heart:    Regular rhythm and normal rate, normal S1 and S2, no            murmur, no gallop, no rub, no click   Breast Exam:    Deferred   Abdomen:     Normal bowel sounds, no masses, no organomegaly, soft        nontender, nondistended, no guarding, no rebound                 tenderness   Genitalia:    Deferred   Extremities:   Moves all extremities well, no edema, no cyanosis, no              redness   Pulses:   Pulses palpable and equal bilaterally   Skin:   No bleeding, bruising or rash   Lymph nodes:   No palpable adenopathy   Neurologic:   Cranial nerves 2 - 12 grossly intact, sensation intact, DTR        present and equal bilaterally      Results Review:     Lab Results (last 24 hours)     Procedure Component Value Units Date/Time    POC Glucose Once [[id number]]  (Abnormal) Collected: 07/05/22 1931    Specimen: Blood Updated: 07/05/22 2012     Glucose 318 mg/dL      Comment: RN NotifiedOperator: [id number] DARIEN SARAHMeter ID: [id number]       POC Glucose Once [[id number]]  (Abnormal) Collected: 07/05/22 1612    Specimen: Blood Updated: 07/05/22 1623     Glucose 291 mg/dL      Comment: RN NotifiedOperator: [id number] MARKS ALEXISMeter ID: [id number]       POC Glucose Once [[id number]]  (Abnormal) Collected: 07/05/22 1031     Specimen: Blood Updated: 07/05/22 1045     Glucose 262 mg/dL      Comment: RN NotifiedOperator: [id number] SELINA FAYEISMeter ID: [id number]       Blood Culture - Blood, Blood, Central Line [[id number]]  (Normal) Collected: 06/30/22 0746    Specimen: Blood, Central Line Updated: 07/05/22 0803     Blood Culture No growth at 5 days    Blood Culture - Blood, Arm, Right [[id number]]  (Normal) Collected: 06/30/22 0746    Specimen: Blood from Arm, Right Updated: 07/05/22 0803     Blood Culture No growth at 5 days    POC Glucose Once [[id number]]  (Abnormal) Collected: 07/05/22 0717    Specimen: Blood Updated: 07/05/22 0738     Glucose 154 mg/dL      Comment: RN NotifiedOperator: [id number] ZEV FAITHMeter ID: [id number]       Comprehensive Metabolic Panel [[id number]]  (Abnormal) Collected: 07/05/22 0601    Specimen: Blood Updated: 07/05/22 0643     Glucose 161 mg/dL      BUN 30 mg/dL      Creatinine 3.69 mg/dL      Sodium 132 mmol/L      Potassium 3.5 mmol/L      Chloride 92 mmol/L      CO2 27.0 mmol/L      Calcium 9.7 mg/dL      Total Protein 7.8 g/dL      Albumin 3.30 g/dL      ALT (SGPT) <5 U/L      AST (SGOT) 10 U/L      Alkaline Phosphatase 155 U/L      Total Bilirubin 0.2 mg/dL      Globulin 4.5 gm/dL      A/G Ratio 0.7 g/dL      BUN/Creatinine Ratio 8.1     Anion Gap 13.0 mmol/L      eGFR 13.2 mL/min/1.73      Comment: <15 Indicative of kidney failure       Narrative:      GFR Normal >60  Chronic Kidney Disease <60  Kidney Failure <15      CBC Auto Differential [[id number]]  (Abnormal) Collected: 07/05/22 0601    Specimen: Blood Updated: 07/05/22 0624     WBC 8.98 10*3/mm3      RBC 3.69 10*6/mm3      Hemoglobin 9.8 g/dL      Hematocrit 31.3 %      MCV 84.8 fL      MCH 26.6 pg      MCHC 31.3 g/dL      RDW 16.6 %      RDW-SD 50.4 fl      MPV 8.5 fL      Platelets 276 10*3/mm3      Neutrophil % 62.2 %      Lymphocyte % 22.3 %      Monocyte % 8.8 %      Eosinophil % 4.0 %      Basophil % 1.0 %      Immature Grans %  1.7 %      Neutrophils, Absolute 5.59 10*3/mm3      Lymphocytes, Absolute 2.00 10*3/mm3      Monocytes, Absolute 0.79 10*3/mm3      Eosinophils, Absolute 0.36 10*3/mm3      Basophils, Absolute 0.09 10*3/mm3      Immature Grans, Absolute 0.15 10*3/mm3      nRBC 0.0 /100 WBC          I reviewed the patient's new clinical results.  I reviewed the patient's new imaging results and agree with the interpretation.     ASSESSMENT/PLAN:   ASSESSMENT: 1.  Anemia with heme positive stool, likely due to GI blood loss.  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  Hemoglobin is stable.  2.  Pneumonia, on antibiotics  3.  History of Flexeril overdosage  4.  End-stage renal disease on hemodialysis  5.  Oropharyngeal dysphagia, speech pathology evaluation noted.  PLAN:  1.  Continue PPI and current supportive care  2.  Follow H&H closely and transfuse as needed  3.  Endoscopic evaluation if hemoglobin drops.  4.  PEG tube placement if dysphagia continues  The risks, benefits, and alternatives of this procedure have been discussed with the patient or the responsible party- the patient understands and agrees to proceed.         Mingo Duarte MD  22  22:54 CDT          Electronically signed by Mingo Duarte MD at 22 [id number]     Ace Lauren MD at 22 1229          [facility]  [address] [place] [address]. [address]  T - [phone]  F - [phone]     Progress Note          PATIENT  DEMOGRAPHICS   PATIENT NAME: Yamileth Sylvester Bryon                      PHYSICIAN: Ace Lauren MD  : 1960  MRN: [id number]   LOS: 10 days    Patient Care Team:  Rianna Macias MD as PCP - General (Family Medicine)  Subjective   SUBJECTIVE   Weak with therapy, sitting at the edge of bed and eating lunch          Objective   OBJECTIVE   Vital Signs  Temp:  [95.9 °F (35.5 °C)-98 °F (36.7 °C)] 97.3 °F (36.3 °C)  Heart Rate:  [64-74] 64  Resp:  [18-20] 18  BP: (110-144)/(55-64)  "142/61    Flowsheet Rows      Flowsheet Row First Filed Value   Admission Height 157.5 cm (62\") Documented at 06/25/2022 1743   Admission Weight 135 kg (297 lb) Documented at 06/25/2022 1743             I/O last 3 completed shifts:  In: 1919 [P.O.:400; Other:254; NG/GT:665; IV Piggyback:600]  Out: 2600 [Urine:100; Other:2500]    PHYSICAL EXAM    Physical Exam  Constitutional:       Appearance: She is well-developed. She is ill-appearing.   HENT:      Head: Normocephalic.   Eyes:      Pupils: Pupils are equal, round, and reactive to light.   Cardiovascular:      Rate and Rhythm: Normal rate and regular rhythm.      Heart sounds: Normal heart sounds.   Pulmonary:      Effort: Pulmonary effort is normal.      Breath sounds: Normal breath sounds.   Abdominal:      General: Bowel sounds are normal.      Palpations: Abdomen is soft.   Musculoskeletal:         General: No swelling.   Skin:     Coloration: Skin is not jaundiced.   Neurological:      Deep Tendon Reflexes: Reflexes normal.         RESULTS   Results Review:    Results from last 7 days   Lab Units 07/05/22  0601 07/04/22  0646 07/03/22  0445   SODIUM mmol/L 132* 128* 128*   POTASSIUM mmol/L 3.5 4.2 4.1   CHLORIDE mmol/L 92* 89* 90*   CO2 mmol/L 27.0 23.0 25.0   BUN mg/dL 30* 60* 42*   CREATININE mg/dL 3.69* 5.65* 4.35*   CALCIUM mg/dL 9.7 9.2 9.8   BILIRUBIN mg/dL 0.2 0.2 0.2   ALK PHOS U/L 155* 198* 181*   ALT (SGPT) U/L <5 <5 <5   AST (SGOT) U/L 10 10 10   GLUCOSE mg/dL 161* 383* 328*       Estimated Creatinine Clearance: 19.4 mL/min (A) (by C-G formula based on SCr of 3.69 mg/dL (H)).    Results from last 7 days   Lab Units 07/03/22  0445 07/02/22  0334 07/01/22  0558   MAGNESIUM mg/dL 2.6* 2.6* 1.9             Results from last 7 days   Lab Units 07/05/22  0601 07/04/22  0646 07/03/22  0445 07/02/22  0334 07/01/22  0548   WBC 10*3/mm3 8.98 8.81 10.15 11.53* 12.12*   HEMOGLOBIN g/dL 9.8* 9.7* 9.6* 9.4* 9.5*   PLATELETS 10*3/mm3 276 302 337 259 347         "       Imaging Results (Last 24 Hours)       ** No results found for the last 24 hours. **             MEDICATIONS    bumetanide, 2 mg, Oral, Once per day on Sun Tue Thu Sat  epoetin hubert/hubert-epbx, 6,000 Units, Intravenous, Once per day on Mon Wed Fri  [START ON 7/6/2022] fluconazole, 200 mg, Oral, Once per day on Mon Wed Fri   And  fluconazole, 100 mg, Oral, Once per day on Sun Tue Thu Sat  gabapentin, 300 mg, Nasogastric, Daily  guaiFENesin, 1,200 mg, Oral, Q12H  Insulin Aspart, 0-9 Units, Subcutaneous, TID AC  Insulin Aspart, 8 Units, Subcutaneous, 4x Daily  insulin detemir, 25 Units, Subcutaneous, Q12H  isosorbide mononitrate, 30 mg, Oral, Q24H  levothyroxine, 25 mcg, Oral, Q AM  linezolid, 600 mg, Oral, Q12H  lisinopril, 20 mg, Oral, Once per day on Sun Tue Thu Sat  metoprolol tartrate, 25 mg, Oral, Q12H  nystatin, , Topical, Q12H  pantoprazole, 40 mg, Intravenous, Q24H  senna-docusate sodium, 2 tablet, Oral, BID  sodium chloride, 10 mL, Intravenous, Q12H  sodium chloride, 10 mL, Intravenous, Q12H  sodium chloride, 10 mL, Intravenous, Q12H      potassium chloride, 10 mEq, Last Rate: Stopped (06/26/22 0130)  potassium chloride, 10 mEq, Last Rate: Stopped (06/26/22 0212)        Assessment & Plan   ASSESSMENT / PLAN      Ventilator associated pneumonia (HCC)    Hypertension    COPD (chronic obstructive pulmonary disease) (HCC)    Coronary artery disease involving native coronary artery of native heart without angina pectoris    Hypothyroidism    Acute cystitis with hematuria    Type 2 diabetes mellitus with autonomic neuropathy (HCC)    ESRD on hemodialysis (HCC)    Accidental drug overdose    Acute respiratory failure with hypoxia and hypercapnia (HCC)    Anemia    Positive fecal occult blood test    Yeast UTI    1.ESRD dependent on hemodialysis since October 2021.  Has tunnel catheter. Patient is on dialysis MWF at [facility]. Lost weight from 297 to 260 lbs and now on bumex po non dialysis days. Hd  tomorrow     2. Anemia chronic kidney disease.  Patient had extensive work-up in the past by Dr. Munoz. She also has B12 deficiency.  She has history of AVM.  She received 2 units of packed RBCs. Now on epogen 6000 units. hgb between 9-10     3. htn - runs low with hd. Lisinopril on non dialysis days. Bp is stable     4. Possible PNA/Chronic Resp Failure related to COPD-now extubated 7/1/22. She is MRSA screen positive.Patient is on home oxygen and trilogy when sleeping.  On zyvox. On pureed diet and may remove NG tube today      5. Possible OD with flexeril- rec'd activated charcoal per g tube     6. DM- Glucose was 605 on arrival- management per primary team.      7.coronary artery disease     8.chronic kidney disease/mineral and bone disorder on sevelamer                 This document has been electronically signed by Ace Lauren MD on July 5, 2022 12:29 CDT           Electronically signed by Ace Lauren MD at 07/05/22 1643       Medical Student Notes (most recent note)    No notes of this type exist for this encounter.         Consult Notes (last 24 hours)  Notes from 07/06/22 1201 through 07/07/22 1201   No notes of this type exist for this encounter.

## 2022-07-07 NOTE — PLAN OF CARE
Problem: Adult Inpatient Plan of Care  Goal: Plan of Care Review  Recent Flowsheet Documentation  Taken 7/7/2022 0725 by Rekha Perez COTA  Progress: improving  Plan of Care Reviewed With: patient  Outcome Evaluation: Pt sitting in recliner. Agreed to ADL's. UB and LB bathing and dressing Supervision. Grooming Supervision. Sit to stand X5 CGA. Pt sitting bschair all needs in reach.   Goal Outcome Evaluation:  Plan of Care Reviewed With: patient        Progress: improving  Outcome Evaluation: Pt sitting in recliner. Agreed to ADL's. UB and LB bathing and dressing Supervision. Grooming Supervision. Sit to stand X5 CGA. Pt sitting bschair all needs in reach.

## 2022-07-07 NOTE — DISCHARGE SUMMARY
DISCHARGE SUMMARY    PATIENT NAME: Yamileth Slater       PHYSICIAN: Jerman Coffman MD  : 1960  MRN: [id number]    ADMITTED: 2022     DISCHARGED: 2022    ADMISSION DIAGNOSES:  Active Hospital Problems    Diagnosis  POA   • **Ventilator associated pneumonia (HCC) [J95.851]  Yes   • Yeast UTI [B37.49]  Unknown   • Positive fecal occult blood test [R19.5]  Unknown   • Anemia [D64.9]  Yes   • ESRD on hemodialysis (HCC) [N18.6, Z99.2]  Not Applicable   • Type 2 diabetes mellitus with autonomic neuropathy ([facility]) [E11.43]  Yes   • Hypothyroidism [E03.9]  Yes   • Hypertension [I10]  Yes   • Coronary artery disease involving native coronary artery of native heart without angina pectoris [I25.10]  Yes   • COPD (chronic obstructive pulmonary disease) ([facility]) [J44.9]  Yes      Resolved Hospital Problems    Diagnosis Date Resolved POA   • Generalized abdominal pain [R10.84] 2022 Unknown   • Hypomagnesemia [E83.42] 2022 Unknown   • Accidental drug overdose [T50.901A] 2022 Yes   • On mechanically assisted ventilation ([facility]) [Z99.11] 2022 Not Applicable   • Severe sepsis without septic shock (CODE) ([facility]) [R65.20] 2022 Yes   • Pleural effusion on right [J90] 2022 Yes   • Acute respiratory failure with hypoxia and hypercapnia ([facility]) [J96.01, J96.02] 2022 Yes   • Altered mental status [R41.82] 2022 Yes   • Hyperosmolar hyperglycemic state (HHS) ([facility]) [E11.00, E11.65] 2022 Yes   • Hypokalemia [E87.6] 2022 Yes   • Acute cystitis with hematuria [N30.01] 2022 Unknown     DISCHARGE DIAGNOSES:   Active Hospital Problems    Diagnosis  POA   • **Ventilator associated pneumonia (HCC) [J95.851]  Yes   • Yeast UTI [B37.49]  Unknown   • Positive fecal occult blood test [R19.5]  Unknown   • Anemia [D64.9]  Yes   • ESRD on hemodialysis (HCC) [N18.6, Z99.2]  Not Applicable   • Type 2 diabetes mellitus with autonomic neuropathy (HCC) [E11.43]  Yes   • Hypothyroidism  [E03.9]  Yes   • Hypertension [I10]  Yes   • Coronary artery disease involving native coronary artery of native heart without angina pectoris [I25.10]  Yes   • COPD (chronic obstructive pulmonary disease) ([facility]) [J44.9]  Yes      Resolved Hospital Problems    Diagnosis Date Resolved POA   • Generalized abdominal pain [R10.84] 07/02/2022 Unknown   • Hypomagnesemia [E83.42] 07/01/2022 Unknown   • Accidental drug overdose [T50.901A] 07/07/2022 Yes   • On mechanically assisted ventilation ([facility]) [Z99.11] 07/03/2022 Not Applicable   • Severe sepsis without septic shock (CODE) ([facility]) [R65.20] 06/27/2022 Yes   • Pleural effusion on right [J90] 07/02/2022 Yes   • Acute respiratory failure with hypoxia and hypercapnia ([facility]) [J96.01, J96.02] 07/07/2022 Yes   • Altered mental status [R41.82] 07/03/2022 Yes   • Hyperosmolar hyperglycemic state (HHS) ([facility]) [E11.00, E11.65] 06/27/2022 Yes   • Hypokalemia [E87.6] 06/30/2022 Yes   • Acute cystitis with hematuria [N30.01] 07/07/2022 Unknown       SERVICE: Family Medicine Residency  Attending: Dr. Argueta  Resident: Jerman Coffman MD    CONSULTS:   Consult Orders (all) (From admission, onward)     Start     Ordered    07/05/22 1221  Inpatient Psychiatrist Consult  Once        Specialty:  Psychiatry  Provider:  Toney Brown II, MD    07/05/22 1220    07/05/22 1021  Inpatient Case Management  Consult  Once        Provider:  (Not yet assigned)    07/05/22 1020    07/01/22 1525  Inpatient Gastroenterology Consult  Once        Specialty:  Gastroenterology  Provider:  Mingo Duarte MD    07/01/22 1525    06/28/22 1351  Inpatient Cardiology Consult  Once        Specialty:  Cardiology  Provider:  Margarito Davis MD    06/28/22 1350    06/25/22 2257  Inpatient Intensivist Consult  Once,   Status:  Canceled        Specialty:  Intensive Care  Provider:  (Not yet assigned)    06/25/22 2257    06/25/22 2246  Inpatient Intensivist Consult  Once,   Status:   Canceled        Specialty:  Intensive Care  Provider:  (Not yet assigned)    06/25/22 2247 06/25/22 1918  Family Practice - Resident (on-call MD unless specified)  Once        Specialty:  Family Medicine  Provider:  (Not yet assigned)    06/25/22 1917 06/25/22 1917  Nephrology - PNA (on-call MD from group unless specified)  Once        Specialty:  Nephrology  Provider:  Timothy Valle MD    06/25/22 1917 06/25/22 1910  Inpatient Diabetes Educator Consult  Once        Provider:  (Not yet assigned)    06/25/22 1909                PROCEDURES:     Intubated 6/25/22 Dr. Chatman - extubated 7-3-22    HISTORY OF PRESENT ILLNESS:     Yamileth Slater is a 62 y.o. female with a CMH of ESRD on dialysis MWF, diabetes, morbid obesity, CAD s/p stents, hyperlipidemia, COPD, hypothyroidism and chronic elevated troponin.  Patient presented to the ER via EMS due to altered mental status with concerns of drug overdose.  It was reported by EMS that patient had ingested 10 tablets of Flexeril.      Patient was emergently intubated to protect the airway.  Patient received activated charcoal sorbitol x1.  Labs were significant with glucose of 605, anion gap 16, hemoglobin of 6.9 and elevated troponin.  ABG showed pH of 7.37, bicarb 27.9.      I spoke with patient's daughter Yamileth Chavez who reports that patient may have accidentally used multiple doses of Flexeril because she had not been feeling well and she did miss her dialysis appointment for today.    Per Dr. Castro's H&P 6/25/22    DIAGNOSTIC DATA:     Lab Results (last 48 hours)     Procedure Component Value Units Date/Time    POC Glucose Once [[id number]]  (Abnormal) Collected: 07/07/22 1019    Specimen: Blood Updated: 07/07/22 1039     Glucose 314 mg/dL      Comment: RN NotifiedOperator: [id number] DOV MCKEONMeter ID: [id number]       Comprehensive Metabolic Panel [[id number]]  (Abnormal) Collected: 07/07/22 0759    Specimen: Blood Updated: 07/07/22 0810      Glucose 115 mg/dL      BUN 26 mg/dL      Creatinine 3.81 mg/dL      Sodium 131 mmol/L      Potassium 4.3 mmol/L      Chloride 92 mmol/L      CO2 23.0 mmol/L      Calcium 9.5 mg/dL      Total Protein 8.3 g/dL      Albumin 3.40 g/dL      ALT (SGPT) 6 U/L      AST (SGOT) 13 U/L      Alkaline Phosphatase 162 U/L      Total Bilirubin 0.3 mg/dL      Globulin 4.9 gm/dL      A/G Ratio 0.7 g/dL      BUN/Creatinine Ratio 6.8     Anion Gap 16.0 mmol/L      eGFR 12.7 mL/min/1.73      Comment: <15 Indicative of kidney failure       Narrative:      GFR Normal >60  Chronic Kidney Disease <60  Kidney Failure <15      CBC Auto Differential [[id number]]  (Abnormal) Collected: 07/07/22 0759    Specimen: Blood Updated: 07/07/22 0807     WBC 10.54 10*3/mm3      RBC 4.02 10*6/mm3      Hemoglobin 10.8 g/dL      Hematocrit 33.4 %      MCV 83.1 fL      MCH 26.9 pg      MCHC 32.3 g/dL      RDW 16.8 %      RDW-SD 48.9 fl      MPV 8.2 fL      Platelets 245 10*3/mm3      Neutrophil % 66.4 %      Lymphocyte % 21.4 %      Monocyte % 8.1 %      Eosinophil % 2.2 %      Basophil % 0.9 %      Immature Grans % 1.0 %      Neutrophils, Absolute 6.99 10*3/mm3      Lymphocytes, Absolute 2.26 10*3/mm3      Monocytes, Absolute 0.85 10*3/mm3      Eosinophils, Absolute 0.23 10*3/mm3      Basophils, Absolute 0.10 10*3/mm3      Immature Grans, Absolute 0.11 10*3/mm3      nRBC 0.0 /100 WBC     POC Glucose Once [[id number]]  (Normal) Collected: 07/07/22 0631    Specimen: Blood Updated: 07/07/22 0645     Glucose 93 mg/dL      Comment: : [id number] DARIEN WIGGINSGalleonMeter ID: [id number]       POC Glucose Once [[id number]]  (Abnormal) Collected: 07/06/22 1934    Specimen: Blood Updated: 07/06/22 2010     Glucose 246 mg/dL      Comment: RN NotifiedOperator: [id number] DARIEN WIGGINSGalleonMeter ID: [id number]       POC Glucose Once [[id number]]  (Abnormal) Collected: 07/06/22 1608    Specimen: Blood Updated: 07/06/22 1632     Glucose 232 mg/dL      Comment: RN  NotifiedOperator: [id number] MARKS ALEXISMeter ID: [id number]       POC Glucose Once [[id number]]  (Abnormal) Collected: 07/06/22 1402    Specimen: Blood Updated: 07/06/22 1425     Glucose 172 mg/dL      Comment: RN NotifiedOperator: [id number] TARIQ Blevins ID: [id number]       POC Glucose Once [[id number]]  (Abnormal) Collected: 07/06/22 1045    Specimen: Blood Updated: 07/06/22 1057     Glucose 299 mg/dL      Comment: RN NotifiedOperator: [id number] MARKS ALEXISMeter ID: [id number]       Magnesium [[id number]]  (Normal) Collected: 07/06/22 0551    Specimen: Blood Updated: 07/06/22 1056     Magnesium 2.3 mg/dL     POC Glucose Once [[id number]]  (Abnormal) Collected: 07/06/22 0611    Specimen: Blood Updated: 07/06/22 0626     Glucose 293 mg/dL      Comment: RN NotifiedOperator: [id number] DARIEN Anthony ID: [id number]       Comprehensive Metabolic Panel [[id number]]  (Abnormal) Collected: 07/06/22 0551    Specimen: Blood Updated: 07/06/22 0621     Glucose 319 mg/dL      BUN 40 mg/dL      Creatinine 4.99 mg/dL      Sodium 132 mmol/L      Potassium 4.6 mmol/L      Chloride 93 mmol/L      CO2 24.0 mmol/L      Calcium 9.2 mg/dL      Total Protein 8.0 g/dL      Albumin 3.50 g/dL      ALT (SGPT) 5 U/L      AST (SGOT) 14 U/L      Alkaline Phosphatase 220 U/L      Total Bilirubin <0.2 mg/dL      Globulin 4.5 gm/dL      A/G Ratio 0.8 g/dL      BUN/Creatinine Ratio 8.0     Anion Gap 15.0 mmol/L      eGFR 9.2 mL/min/1.73      Comment: <15 Indicative of kidney failure       Narrative:      GFR Normal >60  Chronic Kidney Disease <60  Kidney Failure <15      C-reactive Protein [[id number]]  (Abnormal) Collected: 07/06/22 0551    Specimen: Blood Updated: 07/06/22 0619     C-Reactive Protein 7.72 mg/dL     CBC Auto Differential [[id number]]  (Abnormal) Collected: 07/06/22 0551    Specimen: Blood Updated: 07/06/22 0601     WBC 8.99 10*3/mm3      RBC 3.73 10*6/mm3      Hemoglobin 10.0 g/dL      Hematocrit 32.2 %       MCV 86.3 fL      MCH 26.8 pg      MCHC 31.1 g/dL      RDW 16.6 %      RDW-SD 51.6 fl      MPV 8.2 fL      Platelets 262 10*3/mm3      Neutrophil % 66.8 %      Lymphocyte % 21.2 %      Monocyte % 6.7 %      Eosinophil % 2.7 %      Basophil % 1.0 %      Immature Grans % 1.6 %      Neutrophils, Absolute 6.01 10*3/mm3      Lymphocytes, Absolute 1.91 10*3/mm3      Monocytes, Absolute 0.60 10*3/mm3      Eosinophils, Absolute 0.24 10*3/mm3      Basophils, Absolute 0.09 10*3/mm3      Immature Grans, Absolute 0.14 10*3/mm3      nRBC 0.0 /100 WBC     POC Glucose Once [[id number]]  (Abnormal) Collected: 07/05/22 1931    Specimen: Blood Updated: 07/05/22 2012     Glucose 318 mg/dL      Comment: RN NotifiedOperator: [id number] DARIEN SARAHMeter ID: [id number]       POC Glucose Once [[id number]]  (Abnormal) Collected: 07/05/22 1612    Specimen: Blood Updated: 07/05/22 1623     Glucose 291 mg/dL      Comment: RN NotifiedOperator: [id number] MARKS ALEXISMeter ID: [id number]           CT Abdomen Pelvis Without Contrast    Result Date: 6/30/2022  CT ABDOMEN PELVIS WITHOUT CONTRAST INDICATION: concern for abscess vs diverticulitis, R41.82 Altered mental status, unspecified J96.00 Acute respiratory failure, unspecified whether with hypoxia or hypercapnia J81.0 Acute pulmonary edema N18.6 End stage renal disease R73.9 Hyperglycemia, unspecified COMPARISON: CT abdomen pelvis without contrast 12/18/2020 TECHNIQUE: Spiral CT images were acquired of the abdomen and pelvis with multiplanar reconstructions without the administration of oral or IV contrast. CONTRAST: None FINDINGS: Limited evaluation due to patient body habitus with contact with the CT gantry producing extensive streak artifact. INFERIOR THORAX: Coronary atherosclerosis or stents. Cardiomegaly. Sternotomy wires. Small right pleural effusion. Groundglass opacities are present at bilateral lung bases with consolidative opacities and bandlike opacities. LIVER: Granulomas.  GALLBLADDER: There is haziness surrounding the gallbladder which could also relate to motion artifact or streak artifact. There are possible gallstones. SPLEEN: Granulomas. PANCREAS: Unremarkable ADRENAL GLANDS: Unremarkable KIDNEYS: Mild bilateral perinephric stranding. URETERS: Unremarkable BLADDER: Decompressed with [place]an REPRODUCTIVE: Hysterectomy GASTROINTESTINAL: Nasogastric tube with tip terminating in stomach. Unremarkable appendix. VASCULAR: Severe calcific atherosclerosis. LYMPH NODES: No lymphadenopathy per CT criteria. PERITONEUM/RETROPERITONEUM: Unremarkable. OSSEOUS STRUCTURES: Degenerative change bilateral SI joints. Straightening of the lumbar lordosis with lumbar spondylosis and degenerative disc disease. SOFT TISSUES: Unremarkable     1. No evidence of abscess or diverticulitis. 2. Haziness surrounding the gallbladder in a region of the scan that it exhibits motion artifact. This could relate to motion or streak artifact. However if there is concern for possible cholecystitis further evaluation could be obtained with gallbladder ultrasound or HIDA. 3. Possible cholelithiasis. 4. Cardiomegaly. 5. Small right pleural effusion. 6. Groundglass opacities, consolidative opacities, and bandlike opacities at the bilateral lung bases, which could represent pneumonia, atelectasis, or edema. 7. Degenerative change bilateral SI joints. 8. Lumbar spondylosis. 9. Degenerative disc disease. Electronically signed by:  Yoon Malloy MD  6/30/2022 4:30 PM CDT Workstation: [id number]    XR Tibia Fibula 2 View Left    Result Date: 6/29/2022  EXAM: XR TIBIA FIBULA 2 VIEWS ORDERING PROVIDER: DAVID ALMENDAREZ CLINICAL HISTORY: Osteomyelitis assessment COMPARISON STUDY: TECHNIQUE: Two views of the left lower leg FINDINGS: There is no acute displaced fracture.  The alignment is anatomic.  No soft tissue abnormality, or radiopaque foreign body is seen. There is no joint effusion. No definite osteolysis Scattered vascular clips in  the leg     No acute displaced fracture, or traumatic subluxation based on current assessment. No definite osteolysis to indicate radiographic evidence of osteomyelitis. If there is concern for subtle involvement due to osteomyelitis, correlation with MRI or triple phase bone scan is recommended due to suboptimal sensitivity of radiographic assessment. Electronically signed by:  Richmond Bernal MD  6/29/2022 7:40 PM CDT Workstation: [id number]    CT Head Without Contrast    Result Date: 6/25/2022  EXAM DESCRIPTION: CT HEAD WO CONTRAST CLINICAL HISTORY: ams TECHNIQUE: 5mm slice thickness axial images through the brain were performed in bone and soft tissue windows in the absence of intravenous contrast.  This exam was performed according to our departmental dose-optimization program which includes use of Automated Exposure Control, adjustment of the mA and/or kV according to patient size and/or use of iterative reconstruction technique. COMPARISON: None. FINDINGS: There is diffuse age-appropriate atrophy seen throughout the brain parenchyma. Mild periventricular white matter changes are seen to be present and there is mild ex vacuo dilatation of the ventricular system. There is no intra-axial or extra-axial bleed. There is no mass or mass effect. The mastoid air cells are patent. No fracture is present. Mucosal thickening in the nostrils ethmoid air cells.     No acute intracranial pathology. Electronically signed by:  Anny Morrison MD, FREDERICK  6/25/2022 7:20 PM CDT Workstation: [id number]    XR Chest 1 View    Result Date: 7/2/2022  PROCEDURE: XR CHEST 1 VIEW, 7/1/2022 5:18 AM CDT CLINICAL INDICATION:    intubate, eval for new opacities, R41.82 Altered mental status, unspecified J96.00 Acute respiratory failure, unspecified whether with hypoxia or hypercapnia J81.0 Acute pulmonary edema N18.6 End stage renal disease R73.9 Hyperglycemia, unspecified. COMPARISON: 6/30/2022 TECHNIQUE:  AP portable radiograph of chest  FINDINGS: Poor visualization of level endotracheal tube due to technical factors and overlapping tubing and wiring. Numerous EKG leads obscure evaluation of enteric tube or tubes. An enteric tube or tubes extend to level of left upper quadrant, tip projects outside field-of-view. Right IJ catheter tip at level of lower SVC. Left neck catheter tip at level of confluence of brachiocephalic vein and SVC. Large cardiac silhouette. Hazy opacification in the bilateral mid and lower lungs. No pneumothorax identified.     Poor visualization of level of distal endotracheal tube on this film due to technical factors and overlapping tubing and wiring. Large cardiac silhouette with bilateral hazy airspace disease in lower lungs. Numerous overlying EKG wires obscure evaluation of the lines and tubes on this film. Electronically signed by:  Jm Mckeon MD  7/2/2022 12:05 AM CDT Workstation: [id number]    XR Chest 1 View    Result Date: 6/30/2022  PROCEDURE: Single chest view portable REASON FOR EXAM:intubated, R41.82 Altered mental status, unspecified J96.00 Acute respiratory failure, unspecified whether with hypoxia or hypercapnia J81.0 Acute pulmonary edema N18.6 End stage renal disease R73.9 Hyperglycemia, unspecified FINDINGS: Comparison exam dated June 28, 2022. Postsurgical changes with median sternotomy. ET tube with tip 3.29 cm above valentina. NG tube with tip below level of diaphragm. Left jugular central venous catheter with tip in the region of the SVC. Right jugular double lumen large bore central venous catheter with tip in the SVC. Cardiomegaly. Vague left upper lung field perihilar interstitial groundglass opacity. Lungs are otherwise clear.. Pleural spaces are normal. No acute osseous abnormality.     1.  Tubes and lines as described above. 2.  Cardiomegaly. 3.  Vague left upper lung field perihilar interstitial groundglass opacity suspicious for very mild atypical pulmonary edema versus early pneumonia.  Electronically signed by:  Young Johnson MD  6/30/2022 8:08 AM CDT Workstation: [id number]    XR Chest 1 View    Result Date: 6/28/2022  Rest x-ray single view. CLINICAL INDICATION: Shortness of breath . Intubated COMPARISON: Chest June 25, 2022 FINDINGS: Cardiac silhouette is enlarged in size. Sternal sutures, from prior cardiac surgery. Pulmonary vascularity is increased. Tunneled central venous catheter right jugular approach with catheter tip in superior vena cava in satisfactory position. Left-sided jugular venous catheter with catheter tip in superior vena cava. Endotracheal tube with tip 3.6 cm above the bifurcation of the valentina in satisfactory position. Small right-sided pleural effusion, unchanged.     Cardiomegaly . Small right-sided effusion. Pulmonary vessel prominence is more pronounced than on prior exam. Electronically signed by:  Hugo Russo MD  6/28/2022 10:56 AM CDT Workstation: [id number]    XR Chest 1 View    Result Date: 6/25/2022  XR CHEST 1 VIEW INDICATION: 62 years Female; line placement LEFT IJ, R41.82 Altered mental status, unspecified J96.00 Acute respiratory failure, unspecified whether with hypoxia or hypercapnia J81.0 Acute pulmonary edema N18.6 End stage renal disease R73.9 Hyperglycemia, unspecified TECHNIQUE: 1 view of the chest was performed. Comparison: 6/25/2022, 9:41 PM. FINDINGS: Lungs/Pleura: Small bilateral pleural effusions are unchanged. Patchy airspace and interstitial opacities in both lungs predominantly in the bilateral lower lobes appear slightly worsened from previous study. No pneumothorax. Heart/Mediastinum: Cardiomegaly. Bones: Median sternotomy wires are intact. Soft tissues: Unremarkable. Lines/Tubes: The tip of the right central venous catheter is at the cavoatrial junction. The tip of the endotracheal tube is 3.7 cm above the valentina. The tip of the enteric tube is at the distal esophagus; recommend advancement. The tip of the left IJ catheter is in the upper SVC,  approximately 4 cm above the cavoatrial junction.. Incidental findings: None.     1.  The tip of the left IJ catheter is in the upper SVC, approximately 4 cm above the cavoatrial junction. 2.  The tip of the right central venous catheter is at the cavoatrial junction. 3.  The tip of the endotracheal tube is 3.7 cm above the valentina. 4.  The tip of the enteric tube is at the distal esophagus; recommend advancement. 5.  Small bilateral pleural effusions are unchanged. Patchy airspace and interstitial opacities in both lungs predominantly in the bilateral lower lobes appear slightly worsened from previous study. Electronically signed by:  Tiff Ga  6/25/2022 11:18 PM CDT Workstation: [id number]    XR Chest 1 View    Result Date: 6/25/2022  XR CHEST 1 VIEW INDICATION: 62 years Female; Confirm ET Tube Placement, R41.82 Altered mental status, unspecified J96.00 Acute respiratory failure, unspecified whether with hypoxia or hypercapnia J81.0 Acute pulmonary edema N18.6 End stage renal disease R73.9 Hyperglycemia, unspecified TECHNIQUE: 1 view of the chest was performed. Comparison: 6/25/2022, 6:24 PM. FINDINGS: Lungs/Pleura: Interval improvement of the bilateral pleural effusions. Interval improvement of the pulmonary edema. No pneumothorax. Heart/Mediastinum: Cardiomegaly. Bones: Unremarkable. Soft tissues: Unremarkable. Lines/Tubes: The tip of the right central venous catheter is at the cavoatrial junction. The tip of the endotracheal tube is 3.4 cm is above the valentina. The tip of the enteric tube is at the distal esophagus; recommend advancement. Incidental findings: None.     1.  The tip of the right central venous catheter is at the cavoatrial junction. 2.  The tip of the endotracheal tube is 3.4 cm is above the valentina. 3.  The tip of the enteric tube is at the distal esophagus; recommend advancement. 4.  Cardiomegaly. 5.  Interval improvement of the bilateral pleural effusions and pulmonary edema. Electronically  "signed by:  Tiff Ga  6/25/2022 10:23 PM CDT Workstation: [id number]    XR Chest 1 View    Result Date: 6/25/2022  INDICATION: ams EXAMINATION/TECHNIQUE: X-RAY - XR CHEST 1 VIEW COMPARISON: Chest x-ray 4/15/2022 ____________________________________________ FINDINGS:  LINES/DEVICES: Endotracheal tube tip is 3.2 cm from valentina. Enteric tube is not visualized distally due to poor penetration. Recommend follow-up abdominal film to localize the tip. The dialysis catheter is stable in position LUNGS: There is pulmonary edema. There is a small right effusion. Left lung is clear. There is no pneumothorax MEDIASTINUM AND CARDIOVASCULAR STRUCTURES: Cardiac silhouette is prominent. There is vascular congestion. BONES AND SOFT TISSUES: Unremarkable.     Mild CHF with edema and small right effusion. Distal enteric tube is not visualized. Electronically signed by:  Pablo Rubio MD  6/25/2022 7:34 PM CDT Workstation: [id number]    XR Abdomen KUB    Addendum Date: 7/1/2022     ADDENDUM ADDENDUM #1 Addendum report: Received question as to whether or not \"tube is in the right place\". Since tube is not entirely visualized on this study exact position of the tube is difficult to ascertain. As stated in the report follow-up is recommended Electronically signed by:  Pablo Rubio MD  7/1/2022 3:58 PM CDT Workstation: [id number]     Result Date: 7/1/2022  INDICATION: Cortrak placement, R41.82 Altered mental status, unspecified J96.00 Acute respiratory failure, unspecified whether with hypoxia or hypercapnia J81.0 Acute pulmonary edema N18.6 End stage renal disease R73.9 Hyperglycemia, unspecified EXAMINATION/TECHNIQUE: Portable view upper abdomen COMPARISON: None. ____________________________________________ FINDINGS:  Feeding tube is positioned well below the diaphragm. The images do not include the right upper quadrant. The feeding tube crosses the midline and appears to curve into the right upper quadrant. It may be positioned near the " gastric antrum. Follow-up is recommended     The entire distal feeding tube is not included on this single portable image. Tip appears to project near the gastric antrum Electronically signed by:  Pablo Rubio MD  6/30/2022 7:45 PM CDT Workstation: [id number]    XR Abdomen KUB    Result Date: 6/30/2022  EXAM:   XR Abdomen, 1 View CLINICAL HISTORY:   The patient is 62 years old and is Female; Feeding tube placement, R41.82 Altered mental status, unspecified J96.00 Acute respiratory failure, unspecified whether with hypoxia or hypercapnia J81.0 Acute pulmonary edema N18.6 End stage renal disease R73.9 Hyperglycemia, unspecified TECHNIQUE:   Frontal supine view of the abdomen/pelvis. COMPARISON:   exam earlier today FINDINGS:   GASTROINTESTINAL TRACT:  Unremarkable.  No dilation.   BONES/JOINTS:  Moderate DJD.   TUBES, LINES AND DEVICES:  Feeding tube extends into the proximal duodenum.       Feeding tube extends into the proximal duodenum. Electronically signed by:  Nirmal Jimenez MD  6/30/2022 9:51 PM CDT Workstation: [id number]    XR Abdomen KUB    Result Date: 6/29/2022  RADIOGRAPH ABDOMEN KUB INDICATION: assess stool burden, R41.82 Altered mental status, unspecified J96.00 Acute respiratory failure, unspecified whether with hypoxia or hypercapnia J81.0 Acute pulmonary edema N18.6 End stage renal disease R73.9 Hyperglycemia, unspecified COMPARISON: KUB 10/15/2021 TECHNIQUE: Single frontal radiograph was obtained of the mid/lower abdomen and pelvis. FINDINGS: Due to the patient's size, only the mid and left abdomen were imaged. The right colon is not visualized. There is a nasogastric feeding tube terminating in the stomach. Bowel gas pattern is unremarkable. Penetration to x-rays limited due to the patient's size. There is no significant amount of stool visualized in the transverse, descending, sigmoid colon or rectum. There is severe aortoiliac atherosclerosis. There is osseous degenerative change.     1. Ascending  colon not imaged due to patient size. Given these limitations, no significant stool visualized in transverse, descending, sigmoid colon or rectum. 2. Severe aortoiliac atherosclerosis. Electronically signed by:  Yoon Malloy MD  6/29/2022 3:57 PM CDT Workstation: [id number]    Central Line    Result Date: 6/25/2022  Myesha Sharif DO     6/25/2022 10:27 PM Central Line Patient reassessed immediately prior to procedure Patient location during procedure: ICU Start time: 6/25/2022 10:00 PM Stop Time:6/25/2022 10:15 PM Staff Anesthesiologist: Myesha Sharif DO Preanesthetic Checklist Completed: patient identified, IV checked, site marked, risks and benefits discussed, surgical consent, monitors and equipment checked, pre-op evaluation and timeout performed Central Line Prep Sterile Tech:gloves, cap, gown, mask and sterile barriers Patient monitoring: blood pressure monitoring, continuous pulse oximetry and EKG Central Line Procedure Laterality:left Location:internal jugular Catheter Type:triple lumen Catheter Size:7 Fr Guidance:ultrasound guided PROCEDURE NOTE/ULTRASOUND INTERPRETATION.  Using ultrasound guidance the potential vascular sites for insertion of the catheter were visualized to determine the patency of the vessel to be used for vascular access.  After selecting the appropriate site for insertion, the needle was visualized under ultrasound being inserted into the internal jugular vein, followed by ultrasound confirmation of wire and catheter placement. There were no abnormalities seen on ultrasound; an image was taken; and the patient tolerated the procedure with no complications. Images: still images not obtained Assessment Post procedure:biopatch applied, line sutured and occlusive dressing applied Assessement:blood return through all ports, free fluid flow and chest x-ray ordered Complications:no Patient Tolerance:patient tolerated the procedure well with no apparent complications Additional  Notes U/S used throughout and needle seen throughout No complications      HOSPITAL COURSE:    Patient was admitted after after possible Flexeril overdose.  Patient was intubated for changes in mental status and to protect her airway.  She was initially found to be anemic and was given blood.  Her dialysis was continued 3 times a week and she was followed by nephrology.  High acuity was consulted for assistance with vent management.,  Patient was eventually able to follow commands and passed a spontaneous breathing trial.  Patient will be extubated.  Some inflammatory markers were up.  Patient treated for ventilator associated pneumonia.  Patient also found to have UTI.  Continue treatment to close out ventilator associated pneumonia and yeast UTI.  Patient given fluconazole and linezolid outpatient.  There is concern the patient also had some GI blood loss and gastroenterology was consulted.  She had heme positive stool.  Asked to follow-up with gastroenterology in 1 week outpatient.  Additionally patient saw psychiatry as she did have an overdose.  They signed off on her overdose being accidental.    DISCHARGE CONDITION:   stable    DISPOSITION:  Home-Health Care [facility]    DISCHARGE MEDICATIONS     Discharge Medications      New Medications      Instructions Start Date   fluconazole 100 MG tablet  Commonly known as: DIFLUCAN   Take 1 tablet by mouth on days that you do NOT have hemodialysis.      fluconazole 200 MG tablet  Commonly known as: DIFLUCAN   Take 1 tablet by mouth AFTER hemodialysis on days that you have hemodialysis.   Start Date: July 8, 2022     linezolid 600 MG tablet  Commonly known as: ZYVOX   Take 1 tablet by mouth Every 12 (Twelve) Hours for 3 doses.         Continue These Medications      Instructions Start Date   acetaminophen 650 MG 8 hr tablet  Commonly known as: Tylenol 8 Hour   650 mg, Oral, Every 8 Hours PRN      Adult Aspirin Regimen 81 MG EC tablet  Generic drug: aspirin   81 mg, Oral,  "Daily      albuterol sulfate  (90 Base) MCG/ACT inhaler  Commonly known as: PROVENTIL HFA;VENTOLIN HFA;PROAIR HFA   2 puffs, Inhalation, Every 4 Hours PRN      albuterol (2.5 MG/3ML) 0.083% nebulizer solution  Commonly known as: PROVENTIL   Inhale the contents of 1 vial by nebulization Every 4 (Four) Hours As Needed for Wheezing.      atorvastatin 20 MG tablet  Commonly known as: LIPITOR   20 mg, Oral, Every Night at Bedtime      bumetanide 2 MG tablet  Commonly known as: BUMEX   Take 1 tablet by mouth 4 (Four) Times a Week. Take on non-hemodialysis days.      Capsaicin 0.035 % cream   3 g, Apply externally, 3 Times Daily PRN      Cetirizine HCl 10 MG capsule   1 capsule, Oral, Daily      clopidogrel 75 MG tablet  Commonly known as: PLAVIX   75 mg, Oral, Daily      [employer] Blood Glucose Meter w/Device kit   1 each, Does not apply, 3 Times Daily      Diclofenac Sodium 1 % gel gel  Commonly known as: VOLTAREN   4 g, Topical, 4 Times Daily PRN      DULoxetine 20 MG capsule  Commonly known as: CYMBALTA   40 mg, Oral, Daily      Easy Touch Insulin Syringe 30G X 5/16\" 0.5 ML misc  Generic drug: Insulin Syringe-Needle U-100   USE AS DIRECTED WITH LEVEMIR      Easy Touch Pen Needles 31G X 8 MM misc  Generic drug: Insulin Pen Needle   No dose, route, or frequency recorded.      NovoFine 30G X 8 MM misc  Generic drug: Insulin Pen Needle   As directed 4 times daily      B-D ULTRAFINE III SHORT PEN 31G X 8 MM misc  Generic drug: Insulin Pen Needle   Use to inject insulin 4 (Four) Times a Day as directed.      FreeStyle Jade 2 [place] device   1 each, Does not apply, Continuous      FreeStyle Jade 2 Sensor misc   1 each, Does not apply, Every 14 Days      gabapentin 300 MG capsule  Commonly known as: NEURONTIN   300 mg, Oral, Daily, And an extra pill M/W/F - dialysis days      glucose blood test strip   Use as instructed      insulin detemir 100 UNIT/ML injection  Commonly known as: LEVEMIR   25 Units, Subcutaneous, " Nightly      Insulin Lispro (1 Unit Dial) 100 UNIT/ML solution pen-injector  Commonly known as: HUMALOG   12 Units, Subcutaneous, 3 Times Daily With Meals      ipratropium-albuterol 0.5-2.5 mg/3 ml nebulizer  Commonly known as: DUO-NEB   3 mL, Nebulization, 4 Times Daily - RT      isosorbide mononitrate 30 MG 24 hr tablet  Commonly known as: IMDUR   30 mg, Oral, Every Morning      levothyroxine 25 MCG tablet  Commonly known as: SYNTHROID, LEVOTHROID   25 mcg, Oral, Daily      lisinopril 20 MG tablet  Commonly known as: PRINIVIL,ZESTRIL   20 mg, Oral, Daily      metoprolol tartrate 25 MG tablet  Commonly known as: LOPRESSOR   25 mg, Oral, Every 12 Hours Scheduled      MIRCERA IJ   150 mcg, Every 14 Days      MIRCERA IJ   225 mcg, Every 14 Days      montelukast 10 MG tablet  Commonly known as: SINGULAIR   10 mg, Oral, Nightly      muscle rub 10-15 % cream cream   Apply 1 application topically to the appropriate area as directed 4 (Four) Times a Day As Needed for buttock pain.      nitroglycerin 0.4 MG SL tablet  Commonly known as: NITROSTAT   0.4 mg, Sublingual, Every 5 Minutes PRN      O2  Commonly known as: OXYGEN   3 L/min, Inhalation, Continuous      ondansetron ODT 4 MG disintegrating tablet  Commonly known as: Zofran ODT   4 mg, Translingual, Every 8 Hours PRN      oxybutynin XL 5 MG 24 hr tablet  Commonly known as: DITROPAN-XL   5 mg, Oral, Daily      pantoprazole 40 MG EC tablet  Commonly known as: PROTONIX   40 mg, Oral, Nightly      promethazine 25 MG suppository  Commonly known as: PHENERGAN   Rectal, Every 6 Hours      ranolazine 500 MG 12 hr tablet  Commonly known as: RANEXA   500 mg, Oral, Every 12 Hours Scheduled      Rybelsus 7 MG tablet  Generic drug: Semaglutide   7 mg, Oral, Daily      sevelamer 800 MG tablet  Commonly known as: RENVELA   800 mg, Oral, 3 Times Daily With Meals      sodium bicarbonate 650 MG tablet   1 tablet, Oral         Stop These Medications    cyclobenzaprine 5 MG  tablet  Commonly known as: FLEXERIL            INSTRUCTIONS:  Activity:   Activity Instructions     Activity as Tolerated          Diet:   Diet Instructions     Diet: Cardiac, Renal      Discharge Diet:  Cardiac  Renal       Fluid Restriction per day: 1500 mL Fluid          FOLLOW UP:   Additional Instructions for the Follow-ups that You Need to Schedule     Ambulatory Referral to Home Health ([facility])   As directed      Face to Face Visit Date: 7/6/2022    Follow-up provider for Plan of Care?: I treated the patient in an acute care facility and will not continue treatment after discharge.    Follow-up provider: RIANNA QUINN [[id number]]    Reason/Clinical Findings: functional mobility decreased, on ventilator during admission    Describe mobility limitations that make leaving home difficult: Difficulty walking normal distances    Nursing/Therapeutic Services Requested: Physical Therapy    PT orders: Gait Training Transfer training Home safety assessment Strengthening    Weight Bearing Status: As Tolerated    Frequency: 1 Week 1         Ambulatory Referral to [facility] ([facility])   As directed      Face to Face Visit Date: 7/7/2022    Follow-up provider for Plan of Care?: I treated the patient in an acute care facility and will not continue treatment after discharge.    Follow-up provider: RIANNA QUINN [[id number]]    Reason/Clinical Findings: debility after intubation    Describe mobility limitations that make leaving home difficult: limited ability to walk without walker, even limited distance with walker    Nursing/Therapeutic Services Requested: Occupational Therapy    Frequency: 1 Week 1         Discharge Follow-up with PCP   As directed       Currently Documented PCP:    Rianna Quinn MD    PCP Phone Number:    [phone]     Follow Up Details: with your PCP in 3-5 days            Contact information for follow-up providers     Rianna Quinn MD Follow up on 7/11/2022.    Specialty: Family  Medicine  Why: with your PCP in 3-5 days;Monday July 11th @ 3:15 pm  Contact information:  [address]   [place] [address] [phone]                   Contact information for after-discharge care     Home Medical Care     [facility] .    Services: Home Health Services, Home Rehabilitation, Home Nursing  Contact information:  [address] [place] [address] [phone]                             PENDING TEST RESULTS AT DISCHARGE      Time: >30 minutes were spent in discharge planning, medication reconciliation and coordination of care for this patient.    Dr. Argueta is the attending at time of discharge, She is aware of the patient's status and agrees with the above discharge summary.       Jerman Coffman MD   PGY-3    [facility]  [address], [place] [address]  Office: [phone]    This document has been electronically signed by Jerman Coffman MD on July 8, 2022 06:13 CDT

## 2022-07-07 NOTE — DISCHARGE PLACEMENT REQUEST
"Yamileth Slater (62 y.o. Female)             Date of Birth   1960    Social Security Number       Address   [address] [place] [address]    Home Phone   [phone]    MRN   [id number]       Muslim   None    Marital Status                               Admission Date   6/25/22    Admission Type   Emergency    Admitting Provider   Charlene Castro MD    Attending Provider   Perez Hooker MD    Department, Room/Bed   [address], 304/1       Discharge Date       Discharge Disposition       Discharge Destination                               Attending Provider: Perez Hooker MD    Allergies: Adhesive Tape, Latex, Nsaids, Other    Isolation: Contact   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                 Active Insurance as of 6/25/2022     Primary Coverage     Payor Plan Insurance Group Employer/Plan Group    ANTHEM MEDICARE REPLACEMENT ANTHEM MEDICARE ADVANTAGE [id number]     Payor Plan Address Payor Plan Phone Number Payor Plan Fax Number Effective Dates    [address] [phone]  1/1/2022 - None Entered    [facility] [address]       Subscriber Name Subscriber Birth Date Member ID       YAMILETH SLATER 1960 [id number]           Secondary Coverage     Payor Plan Insurance Group Employer/Plan Group    [place]      Payor Plan Address Payor Plan Phone Number Payor Plan Fax Number Effective Dates    [address] [phone]  6/28/2019 - None Entered    [facility] [id number]       Subscriber Name Subscriber Birth Date Member ID       YAMILETH SLATER 1960 [id number]                 Emergency Contacts      (Rel.) Home Phone Work Phone Mobile Phone    " BryonHuy (Spouse) [phone] -- [phone]    Yamileth Chavez (Daughter) [phone] -- [phone]    Suzanne Rivera (Daughter) [phone] -- [phone]        [address] [address]  Phone:  [phone]  Fax:  [phone] Date: 2022      [facility]     Patient:  Yamileth Slater MRN:  [id number]   [address]  [place] [address] :  1960  SSN:    Phone: [phone] Sex:  F      INSURANCE PAYOR PLAN GROUP # SUBSCRIBER ID   Primary:  Secondary:    ANTHEM MEDICARE REPLACEMENT  [place] [id number]  [id number]    [id number]  [id number]      Referring Provider Information:  DAVID ALMENDAREZ Phone: [phone] Fax: [phone]       Referral Information:   # Visits:  999 Referral Type: Home Health [42]   Urgency:  Routine Referral Reason: Specialty Services Required   Start Date: 2022 End Date:  To be determined by Insurer   Diagnosis: Physical deconditioning (R53.81 [ICD-10-CM] 799.3 [ICD-9-CM])  Acute respiratory failure with hypoxia and hypercapnia (HCC) (J96.01,J96.02 [ICD-10-CM] 518.81 [ICD-9-CM])      Refer to Dept:   Refer to Provider:   Refer to Provider Phone:   Refer to Facility:       Face to Face Visit Date: 2022  Follow-up provider for Plan of Care? I treated the patient in an acute care facility and will not continue treatment after discharge.  Follow-up provider: MAITE QUINN [[id number]]  Reason/Clinical Findings: functional mobility decreased, on ventilator during admission  Describe mobility limitations that make leaving home difficult: Difficulty walking normal distances  Nursing/Therapeutic Services Requested: Physical Therapy  PT orders: Gait Training  PT orders: Transfer training  PT orders: Home safety assessment  PT orders: Strengthening  Weight Bearing Status: As Tolerated  Frequency: 1 Week 1     This document  serves as a request of services and does not constitute Insurance authorization or approval of services.  To determine eligibility, please contact the members Insurance carrier to verify and review coverage.     If you have medical questions regarding this request for services. Please contact [address] at [phone] during normal business hours.        Authorizing Provider:Jerman Coffman MD  Authorizing Provider's NPI: [id number]  Order Entered By: Jerman Coffman MD 7/6/2022  2:05 PM     Electronically signed by: Jerman Coffman MD 7/6/2022  2:05 PM           Emergency Contact Information     Name Relation Home Work Mobile    Huy Slater Spouse [phone]    Yamileth Chavez Daughter [phone]    Suzanne Rivera Daughter [phone]          Insurance Information                [facility] MEDICARE REPLACEMENT/[facility] MEDICARE ADVANTAGE Phone: [phone]    Subscriber: Yamileth Slater Subscriber#: [id number]    Group#: [id number] Precert#: --        [place] Phone: [phone]    Subscriber: Yamileth Slater Subscriber#: [id number]    Group#: -- Precert#: --

## 2022-07-07 NOTE — OUTREACH NOTE
Prep Survey    Flowsheet Row Responses   [facility] facility patient discharged from? [place]   Is LACE score < 7 ? No   Emergency Room discharge w/ pulse ox? No   Eligibility [facility]   Date of Admission 06/25/22   Date of Discharge 07/07/22   Discharge Disposition Home-Health Care Svc   Discharge diagnosis Ventilator associated pneumonia    Does the patient have one of the following disease processes/diagnoses(primary or secondary)? COPD/Pneumonia   Does the patient have Home health ordered? Yes   What is the Home health agency?  [facility]   Is there a DME ordered? No   Prep survey completed? Yes          LEEANNA OLVERA - Registered Nurse

## 2022-07-07 NOTE — PROGRESS NOTES
"[facility]  [address] [place] [address]. [address]  T - [phone]  F - [phone]     Progress Note          PATIENT  DEMOGRAPHICS   PATIENT NAME: Yamileth Slater                      PHYSICIAN: Timothy Valle MD  : 1960  MRN: [id number]   LOS: 12 days    Patient Care Team:  Rianna Macias MD as PCP - General (Family Medicine)  Subjective   SUBJECTIVE   No acute events overnight.  Denied any complaints.  Wants to go home.  No chest pain or shortness of breath.         Objective   OBJECTIVE   Vital Signs  Temp:  [96.8 °F (36 °C)-97.5 °F (36.4 °C)] 97 °F (36.1 °C)  Heart Rate:  [65-76] 65  Resp:  [18] 18  BP: (100-154)/(50-70) 122/56    Flowsheet Rows    Flowsheet Row First Filed Value   Admission Height 157.5 cm (62\") Documented at 2022 1743   Admission Weight 135 kg (297 lb) Documented at 2022 1743           I/O last 3 completed shifts:  In: 1160 [P.O.:1160]  Out: -     PHYSICAL EXAM    Physical Exam  Vitals and nursing note reviewed.   Constitutional:       Appearance: She is not ill-appearing.   Cardiovascular:      Rate and Rhythm: Normal rate and regular rhythm.      Heart sounds: No murmur heard.    No friction rub. No gallop.   Pulmonary:      Effort: No respiratory distress.      Breath sounds: Normal breath sounds. No wheezing, rhonchi or rales.   Abdominal:      General: There is no distension.      Palpations: Abdomen is soft.      Tenderness: There is no abdominal tenderness.   Musculoskeletal:         General: No swelling.   Skin:     General: Skin is warm and dry.   Neurological:      General: No focal deficit present.      Mental Status: She is alert and oriented to person, place, and time.         RESULTS   Results Review:    Results from last 7 days   Lab Units 22  0759 22  0551 22  0601   SODIUM mmol/L 131* 132* 132*   POTASSIUM mmol/L 4.3 4.6 3.5   CHLORIDE mmol/L 92* 93* 92*   CO2 mmol/L 23.0 24.0 27.0   BUN mg/dL 26* " 40* 30*   CREATININE mg/dL 3.81* 4.99* 3.69*   CALCIUM mg/dL 9.5 9.2 9.7   BILIRUBIN mg/dL 0.3 <0.2 0.2   ALK PHOS U/L 162* 220* 155*   ALT (SGPT) U/L 6 5 <5   AST (SGOT) U/L 13 14 10   GLUCOSE mg/dL 115* 319* 161*       Estimated Creatinine Clearance: 18.6 mL/min (A) (by C-G formula based on SCr of 3.81 mg/dL (H)).    Results from last 7 days   Lab Units 07/06/22  0551 07/03/22  0445 07/02/22  0334   MAGNESIUM mg/dL 2.3 2.6* 2.6*             Results from last 7 days   Lab Units 07/07/22  0759 07/06/22  0551 07/05/22  0601 07/04/22  0646 07/03/22  0445   WBC 10*3/mm3 10.54 8.99 8.98 8.81 10.15   HEMOGLOBIN g/dL 10.8* 10.0* 9.8* 9.7* 9.6*   PLATELETS 10*3/mm3 245 262 276 302 337               Imaging Results (Last 24 Hours)     ** No results found for the last 24 hours. **           MEDICATIONS    bumetanide, 2 mg, Oral, Once per day on Sun Tue Thu Sat  epoetin hubert/hubert-epbx, 6,000 Units, Intravenous, Once per day on Mon Wed Fri  fluconazole, 200 mg, Oral, Once per day on Mon Wed Fri   And  fluconazole, 100 mg, Oral, Once per day on Sun Tue Thu Sat  gabapentin, 300 mg, Nasogastric, Daily  guaiFENesin, 1,200 mg, Oral, Q12H  Insulin Aspart, 0-9 Units, Subcutaneous, TID AC  Insulin Aspart, 10 Units, Subcutaneous, 4x Daily  insulin detemir, 25 Units, Subcutaneous, Q12H  isosorbide mononitrate, 30 mg, Oral, Q24H  levothyroxine, 25 mcg, Oral, Q AM  linezolid, 600 mg, Oral, Q12H  lisinopril, 20 mg, Oral, Once per day on Sun Tue Thu Sat  metoprolol tartrate, 25 mg, Oral, Q12H  nystatin, , Topical, Q12H  pantoprazole, 40 mg, Intravenous, Q24H  senna-docusate sodium, 2 tablet, Oral, BID  sodium chloride, 10 mL, Intravenous, Q12H  sodium chloride, 10 mL, Intravenous, Q12H  sodium chloride, 10 mL, Intravenous, Q12H      potassium chloride, 10 mEq, Last Rate: Stopped (06/26/22 0130)  potassium chloride, 10 mEq, Last Rate: Stopped (06/26/22 0212)        Assessment & Plan   ASSESSMENT / PLAN      Ventilator associated pneumonia  (HCC)    Hypertension    COPD (chronic obstructive pulmonary disease) (HCC)    Coronary artery disease involving native coronary artery of native heart without angina pectoris    Hypothyroidism    Acute cystitis with hematuria    Type 2 diabetes mellitus with autonomic neuropathy (HCC)    ESRD on hemodialysis (HCC)    Accidental drug overdose    Acute respiratory failure with hypoxia and hypercapnia (HCC)    Anemia    Positive fecal occult blood test    Yeast UTI    1. ESRD on HD MWF:  - Dialysis dependent since October 2021.  On dialysis at [facility].   - Received HD yesterday and tolerated well. No HD needs today. Will do HD tomorrow.      2. Anemia chronic kidney disease:  - Patient had extensive work-up in the past by Dr. Munoz. She also has B12 deficiency. She has history of AVM.  Received 2 units of packed RBCs. Now on epogen 6000 units.  - Hemoglobin is acceptable at 10.8    3. Hypertension:  - Runs low with HD. Lisinopril on non dialysis days. Blood pressure is stable      4. Possible PNA/Chronic Resp Failure related to COPD:  - Extubated 7/1/22. She is MRSA screen positive.Patient is on home oxygen and trilogy when sleeping.  On Zyvox.      5. Possible OD with flexeril:  - rec'd activated charcoal per G tube     6. Diabetes type 2:  - Management per primary team.      7. Coronary artery disease     8. Chronic kidney disease/mineral and bone disorder:  - On sevelamer              This document has been electronically signed by Timothy Valle MD on July 7, 2022 11:49 CDT

## 2022-07-08 ENCOUNTER — TRANSITIONAL CARE MANAGEMENT TELEPHONE ENCOUNTER (OUTPATIENT)
Dept: CALL CENTER | Facility: HOSPITAL | Age: 62
End: 2022-07-08

## 2022-07-08 ENCOUNTER — HOME CARE VISIT (OUTPATIENT)
Dept: HOME HEALTH SERVICES | Facility: CLINIC | Age: 62
End: 2022-07-08

## 2022-07-08 VITALS
RESPIRATION RATE: 18 BRPM | TEMPERATURE: 96.6 F | SYSTOLIC BLOOD PRESSURE: 132 MMHG | OXYGEN SATURATION: 99 % | DIASTOLIC BLOOD PRESSURE: 76 MMHG | HEART RATE: 70 BPM

## 2022-07-08 PROCEDURE — G0151 HHCP-SERV OF PT,EA 15 MIN: HCPCS

## 2022-07-08 PROCEDURE — G0152 HHCP-SERV OF OT,EA 15 MIN: HCPCS

## 2022-07-08 NOTE — PROGRESS NOTES
SUBJECTIVE:   7/7/2022  Chief Complaint:     Subjective      Patient feels better today.  Denied abdominal pain, nausea or vomiting.  Hemoglobin is 10.  Ambulating with PT.  Tolerating soft diet.  Had psychiatry eval    History:  Past Medical History:   Diagnosis Date   • Acute blood loss anemia 4/16/2017    Likely due to gastric oozing at this time. - Dr. Duarte (GI) was consulted and has now signed off, will follow up outpatient - pill colonoscopy showed AVMs - continue to monitor   • Acute cystitis with hematuria 3/31/2021    1/13: IV Rocephin 1 gm q 24 1/14 : transitioned  to omnicef 300mg. Urine cultures resulted and did not show growth. Omnicef discontinued as patient is asymptomatic   • Altered mental status 1/9/2022    - AMS on presentation - initial ABG pH 7.3, CO2 34 - Procal 0.29 - UA negative for acute cysitits -CTA head wnl  - Empiric Zosyn and Vancomycin -Lactate 2.5 on admission  - blood cultures no growth at 24 hours     • Anxiety    • CAD (coronary artery disease) 4/24/2021    S/P 3 stents 5/1/2021 for [facility] Continue ASA 81mg & Clopidogrel 75mg Continue Atorvastatin 40mg   • Carotid artery stenosis    • Chronic obstructive lung disease (HCC)    • CKD (chronic kidney disease) stage 4, GFR 15-29 ml/min ([facility])    • CKD (chronic kidney disease), symptom management only, stage 5 (HCC) 10/5/2020    Results from last 7 days Lab Units 12/15/21 0548 12/14/21 1323 12/14/21 0916 CREATININE mg/dL 3.92* 3.21* 3.32*  Baseline creatinine 2-3 GFR 13-25 GFR 15 Dialysis MWF, sees Dr. Lauren Nephrology consult,, appreciate recommendations Continue Bumex 1mg bid daily Holding Bumex 2mg 4 times a week   • Colonic polyp    • Coronary arteriosclerosis    • Diabetes mellitus (HCC)    • Diabetic neuropathy (HCC)    • Ear pain, right 10/18/2021    - canal trauma due to patient scratching and DMT2 - added cortisporin ear drops   • Elevated troponin 10/12/2021    -most likely from CKD -Trending down -Neg chest pain   •  Generalized abdominal pain 7/1/2022    Could be due to initiation of tube feeds vs dyspepsia vs abdominal cramps related to no PO intake due to intubation vs constipation Continue current laxative regiment  If no bowel movement by this afternoon will consider enema   • GERD (gastroesophageal reflux disease)    • GI bleed 5/13/2021    - GI will follow up outpatient - Protonix 40mg daily - Avoid medical DVT prophy and use mechanical at this time instead. - Continue to monitor - pill colonoscopy results showed AVMs   • History of transfusion    • Hypercholesterolemia    • Hyperosmolar hyperglycemic state (HHS) ([facility]) 6/25/2022    Serum glucose 605 on admission  Anion gap 16 PH 7.37 Bicarb 27.9 Continue fluids  Insulin drip with [facility] protocol  Anion gap closed around 10 AM, received one dose of Levamir subq, will stop insulin drip after 2 hrs     • Hypertension    • Hypokalemia 5/27/2022    Will replace as needed. Will be cautious in the setting of ESRD to avoid need for emergency dialysis   Monitor Qtc intervals on EKG     • Hypomagnesemia 6/27/2021    Monitor and replace   • Morbid obesity ([facility])    • Nephrolithiasis    • On mechanically assisted ventilation ([facility]) 6/26/2022    Vent management and sedation orders placed.  - [facility] intensivist group consulted for vent management appreciate recommendations  - plan to extubate today     • Peripheral vascular disease ([facility])    • Pleural effusion on right 6/26/2022    CXR on 6/30/22 read as a small upper left pulmonary edema vs early pneumonia.  Last Echo 1/2022 EF 61-65 % Continue to monitor  Procal slightly improved, CRP improved On Linezolid and meropenum      • SIRS (systemic inflammatory response syndrome) ([facility]) 1/9/2022    Admission  - WBC 17.78   -   - RR 16 - 1/10: VSS/wnl - CXR - Mild pulm edema - Blood cultures no growth at 24 hours  - Procalcitonin 0.29 - UA : glucose 1000, negative Leucocytes/nitrate - Empiric Zosyn and Vancomcyin    • Sleep apnea    •  Substance abuse (HCC)    • Vitamin D deficiency      Past Surgical History:   Procedure Laterality Date   • CARDIAC CATHETERIZATION N/A 7/14/2020   • CARDIAC CATHETERIZATION N/A 4/23/2021    Procedure: Left Heart Cath;  Surgeon: Melba Romo MD;  Location: [facility] CATH INVASIVE LOCATION;  Service: Cardiology;  Laterality: N/A;   • CARDIAC CATHETERIZATION N/A 4/30/2021    Procedure: Percutaneous Coronary Intervention;  Surgeon: Russell Voss MD;  Location: [facility] CATH INVASIVE LOCATION;  Service: Cardiovascular;  Laterality: N/A;   • CARDIAC CATHETERIZATION N/A 4/30/2021    Procedure: Stent NIKKI coronary;  Surgeon: Russell Voss MD;  Location: [facility] CATH INVASIVE LOCATION;  Service: Cardiovascular;  Laterality: N/A;   • CARDIAC CATHETERIZATION Left 11/13/2021    Procedure: Left Heart Cath;  Surgeon: Niall Rios MD;  Location: [facility] CATH INVASIVE LOCATION;  Service: Cardiology;  Laterality: Left;   • CAROTID STENT Left    • COLONOSCOPY     • COLONOSCOPY N/A 5/14/2021    Procedure: COLONOSCOPY;  Surgeon: Mingo Duarte MD;  Location: [facility] ENDOSCOPY;  Service: Gastroenterology;  Laterality: N/A;   • CORONARY ARTERY BYPASS GRAFT N/A 2013    CABG X 3   • CYSTOSCOPY BLADDER STONE LITHOTRIPSY Bilateral    • ENDOSCOPY N/A 4/12/2021    Procedure: ESOPHAGOGASTRODUODENOSCOPY;  Surgeon: Mingo Duarte MD;  Location: [facility] ENDOSCOPY;  Service: Gastroenterology;  Laterality: N/A;   • ENDOSCOPY N/A 5/14/2021    Procedure: ESOPHAGOGASTRODUODENOSCOPY;  Surgeon: Mingo Duarte MD;  Location: [facility] ENDOSCOPY;  Service: Gastroenterology;  Laterality: N/A;   • ENDOSCOPY N/A 1/28/2022    Procedure: ESOPHAGOGASTRODUODENOSCOPY;  Surgeon: Mingo Duarte MD;  Location: [facility] ENDOSCOPY;  Service: Gastroenterology;  Laterality: N/A;   • INTERVENTIONAL RADIOLOGY PROCEDURE N/A 10/21/2021    Procedure: tunneled central venous catheter placement;  Surgeon: Donnie Robles MD;  Location:   [facility] ANGIO INVASIVE LOCATION;  Service: Interventional Radiology;  Laterality: N/A;   • INTERVENTIONAL RADIOLOGY PROCEDURE N/A 2022    Procedure: tunneled central venous catheter placement;  Surgeon: Donnie Robles MD;  Location: [facility] INVASIVE LOCATION;  Service: Interventional Radiology;  Laterality: N/A;     Family History   Problem Relation Age of Onset   • Heart disease Mother    • Lung cancer Mother    • Heart disease Father    • Heart attack Father    • Diabetes Father    • Heart disease Half-Sister         Dad's side   • Heart disease Brother    • No Known Problems Sister    • No Known Problems Sister    • No Known Problems Sister    • No Known Problems Sister    • No Known Problems Sister    • Pancreatic cancer Half-Sister         Dad's side   • No Known Problems Brother    • No Known Problems Brother    • Heart attack Half-Brother    • Heart attack Half-Brother    • No Known Problems Maternal Grandmother    • No Known Problems Maternal Grandfather    • No Known Problems Paternal Grandmother    • No Known Problems Paternal Grandfather      Social History     Tobacco Use   • Smoking status: Former Smoker     Packs/day: 0.25     Years: 46.00     Pack years: 11.50     Types: Cigarettes     Start date: 1999     Quit date: 2/15/2022     Years since quittin.3   • Smokeless tobacco: Never Used   • Tobacco comment: only smoking 5 a day - quit 2021   Substance Use Topics   • Alcohol use: No   • Drug use: Not Currently     Types: LSD, Marijuana, Methamphetamines     No medications prior to admission.     Allergies:  Adhesive tape, Latex, Nsaids, and Other     CURRENT MEDICATIONS/OBJECTIVE/VS/PE:     Current Medications:     No current facility-administered medications for this encounter.     Current Outpatient Medications   Medication Sig Dispense Refill   • fluconazole (DIFLUCAN) 100 MG tablet Take 1 tablet by mouth on days that you do NOT have hemodialysis. 7 tablet 0   • [START  "ON 7/8/2022] fluconazole (DIFLUCAN) 200 MG tablet Take 1 tablet by mouth AFTER hemodialysis on days that you have hemodialysis. 5 tablet 0   • linezolid (ZYVOX) 600 MG tablet Take 1 tablet by mouth Every 12 (Twelve) Hours for 3 doses. 3 tablet 0   • acetaminophen (Tylenol 8 Hour) 650 MG 8 hr tablet Take 1 tablet by mouth Every 8 (Eight) Hours As Needed for Mild Pain . 90 tablet 0   • albuterol (PROVENTIL) (2.5 MG/3ML) 0.083% nebulizer solution Inhale the contents of 1 vial by nebulization Every 4 (Four) Hours As Needed for Wheezing. 75 mL 3   • albuterol sulfate  (90 Base) MCG/ACT inhaler Inhale 2 puffs Every 4 (Four) Hours As Needed for Wheezing. 18 g 1   • aspirin (aspirin) 81 MG EC tablet Take 1 tablet by mouth Daily. 60 tablet 5   • atorvastatin (LIPITOR) 20 MG tablet Take 1 tablet by mouth every night at bedtime. 90 tablet 0   • Blood Glucose Monitoring Suppl ([employer] Blood Glucose Meter) w/Device kit 1 each 3 (Three) Times a Day. 1 kit 3   • bumetanide (BUMEX) 2 MG tablet Take 1 tablet by mouth 4 (Four) Times a Week. Take on non-hemodialysis days. 16 tablet 0   • Capsaicin 0.035 % cream Apply 3 g topically 3 (Three) Times a Day As Needed (ankle pain). 42.5 g 2   • Cetirizine HCl 10 MG capsule Take 1 capsule by mouth Daily.     • clopidogrel (PLAVIX) 75 MG tablet Take 1 tablet by mouth Daily. 30 tablet 5   • Continuous Blood Gluc  (FreeStyle Jade 2 [place]) device 1 each Continuous. 1 each 0   • Continuous Blood Gluc Sensor (FreeStyle Jade 2 Sensor) misc 1 each Every 14 (Fourteen) Days. 2 each 11   • Diclofenac Sodium (VOLTAREN) 1 % gel gel Apply 4 g topically to the appropriate area as directed 4 (Four) Times a Day As Needed (Ankle pain). 350 g 2   • DULoxetine (CYMBALTA) 20 MG capsule Take 2 capsules by mouth Daily for 90 days. 180 capsule 0   • Easy Touch Insulin Syringe 30G X 5/16\" 0.5 ML misc USE AS DIRECTED WITH LEVEMIR     • EASY TOUCH PEN NEEDLES 31G X 8 MM misc      • gabapentin (NEURONTIN) " 300 MG capsule Take 1 capsule by mouth Daily. And an extra pill M/W/F - dialysis days 45 capsule 2   • glucose blood test strip Use as instructed 100 each 12   • insulin detemir (LEVEMIR) 100 UNIT/ML injection Inject 25 Units under the skin into the appropriate area as directed Every Night. 3 mL 12   • Insulin Lispro, 1 Unit Dial, (HUMALOG) 100 UNIT/ML solution pen-injector Inject 12 Units under the skin into the appropriate area as directed 3 (Three) Times a Day With Meals. 30 mL 12   • Insulin Pen Needle (NovoFine) 30G X 8 MM misc As directed 4 times daily 100 each 11   • Insulin Pen Needle 31G X 8 MM misc Use to inject insulin 4 (Four) Times a Day as directed. 120 each 12   • ipratropium-albuterol (DUO-NEB) 0.5-2.5 mg/3 ml nebulizer Take 3 mL by nebulization 4 (Four) Times a Day.     • isosorbide mononitrate (IMDUR) 30 MG 24 hr tablet Take 1 tablet by mouth Every Morning. 90 tablet 1   • levothyroxine (SYNTHROID, LEVOTHROID) 25 MCG tablet Take 25 mcg by mouth Daily.     • lisinopril (PRINIVIL,ZESTRIL) 20 MG tablet Take 1 tablet by mouth Daily. 90 tablet 0   • Methoxy PEG-Epoetin Beta (MIRCERA IJ) 150 mcg Every 14 (Fourteen) Days.     • Methoxy PEG-Epoetin Beta (MIRCERA IJ) 225 mcg Every 14 (Fourteen) Days.     • metoprolol tartrate (LOPRESSOR) 25 MG tablet Take 1 tablet by mouth Every 12 (Twelve) Hours. 180 tablet 0   • montelukast (SINGULAIR) 10 MG tablet Take 1 tablet by mouth Every Night. 90 tablet 0   • muscle rub (BenGay) 10-15 % cream cream Apply 1 application topically to the appropriate area as directed 4 (Four) Times a Day As Needed for buttock pain. 85 g 1   • nitroglycerin (NITROSTAT) 0.4 MG SL tablet Place 0.4 mg under the tongue Every 5 (Five) Minutes As Needed for Chest Pain (x 3 doses).     • O2 (OXYGEN) Inhale 3 L/min Continuous.     • ondansetron ODT (Zofran ODT) 4 MG disintegrating tablet Place 1 tablet on the tongue Every 8 (Eight) Hours As Needed for Nausea or Vomiting. 30 tablet 0   •  oxybutynin XL (DITROPAN-XL) 5 MG 24 hr tablet Take 1 tablet by mouth Daily. 90 tablet 0   • pantoprazole (PROTONIX) 40 MG EC tablet Take 1 tablet by mouth Every Night. 90 tablet 0   • promethazine (PHENERGAN) 25 MG suppository Insert  into the rectum Every 6 (Six) Hours.     • ranolazine (RANEXA) 500 MG 12 hr tablet Take 1 tablet by mouth Every 12 (Twelve) Hours. 180 tablet 0   • Semaglutide (Rybelsus) 7 MG tablet Take 7 mg by mouth Daily. 90 tablet 0   • sevelamer (RENVELA) 800 MG tablet Take 800 mg by mouth 3 (Three) Times a Day With Meals.     • sodium bicarbonate 650 MG tablet Take 1 tablet by mouth.         Objective     Review of Systems:   Review of Systems   Constitutional: Negative for chills, fatigue, fever and unexpected weight change.   HENT: Negative for congestion, ear discharge, hearing loss, nosebleeds and sore throat.    Eyes: Negative for pain, discharge and redness.   Respiratory: Negative for cough, chest tightness, shortness of breath and wheezing.    Cardiovascular: Negative for chest pain and palpitations.   Gastrointestinal: Negative for abdominal distention, abdominal pain, blood in stool, constipation, diarrhea, nausea and vomiting.   Endocrine: Negative for cold intolerance, polydipsia, polyphagia and polyuria.   Genitourinary: Negative for dysuria, flank pain, frequency, hematuria and urgency.   Musculoskeletal: Negative for arthralgias, back pain, joint swelling and myalgias.   Skin: Negative for color change, pallor and rash.   Neurological: Negative for tremors, seizures, syncope, weakness and headaches.   Hematological: Negative for adenopathy. Does not bruise/bleed easily.   Psychiatric/Behavioral: Negative for behavioral problems, confusion, dysphoric mood, hallucinations and suicidal ideas. The patient is not nervous/anxious.        Physical Exam:   Temp:  [96.8 °F (36 °C)-97.3 °F (36.3 °C)] 97.2 °F (36.2 °C)  Heart Rate:  [61-71] 61  Resp:  [18] 18  BP: (101-154)/(44-70) 101/44      Physical Exam:  General Appearance:    Alert, cooperative, in no acute distress   Head:    Normocephalic, without obvious abnormality, atraumatic   Eyes:            Lids and lashes normal, conjunctivae and sclerae normal, no   icterus, no pallor, corneas clear, PERRLA   Ears:    Ears appear intact with no abnormalities noted   Throat:   No oral lesions, no thrush, oral mucosa moist   Neck:   No adenopathy, supple, trachea midline, no thyromegaly, no     carotid bruit, no JVD   Back:     No kyphosis present, no scoliosis present, no skin lesions,       erythema or scars, no tenderness to percussion or                   palpation,   range of motion normal   Lungs:     Clear to auscultation,respirations regular, even and                   unlabored    Heart:    Regular rhythm and normal rate, normal S1 and S2, no            murmur, no gallop, no rub, no click   Breast Exam:    Deferred   Abdomen:     Normal bowel sounds, no masses, no organomegaly, soft        nontender, nondistended, no guarding, no rebound                 tenderness   Genitalia:    Deferred   Extremities:   Moves all extremities well, no edema, no cyanosis, no              redness   Pulses:   Pulses palpable and equal bilaterally   Skin:   No bleeding, bruising or rash   Lymph nodes:   No palpable adenopathy   Neurologic:   Cranial nerves 2 - 12 grossly intact, sensation intact, DTR        present and equal bilaterally      Results Review:     Lab Results (last 24 hours)     Procedure Component Value Units Date/Time    POC Glucose Once [[id number]]  (Abnormal) Collected: 07/07/22 1019    Specimen: Blood Updated: 07/07/22 1039     Glucose 314 mg/dL      Comment: RN NotifiedOperator: [id number] DOV MCKEONMeter ID: [id number]       Comprehensive Metabolic Panel [[id number]]  (Abnormal) Collected: 07/07/22 0759    Specimen: Blood Updated: 07/07/22 0829     Glucose 115 mg/dL      BUN 26 mg/dL      Creatinine 3.81 mg/dL      Sodium 131 mmol/L       Potassium 4.3 mmol/L      Chloride 92 mmol/L      CO2 23.0 mmol/L      Calcium 9.5 mg/dL      Total Protein 8.3 g/dL      Albumin 3.40 g/dL      ALT (SGPT) 6 U/L      AST (SGOT) 13 U/L      Alkaline Phosphatase 162 U/L      Total Bilirubin 0.3 mg/dL      Globulin 4.9 gm/dL      A/G Ratio 0.7 g/dL      BUN/Creatinine Ratio 6.8     Anion Gap 16.0 mmol/L      eGFR 12.7 mL/min/1.73      Comment: <15 Indicative of kidney failure       Narrative:      GFR Normal >60  Chronic Kidney Disease <60  Kidney Failure <15      CBC Auto Differential [[id number]]  (Abnormal) Collected: 07/07/22 0759    Specimen: Blood Updated: 07/07/22 0807     WBC 10.54 10*3/mm3      RBC 4.02 10*6/mm3      Hemoglobin 10.8 g/dL      Hematocrit 33.4 %      MCV 83.1 fL      MCH 26.9 pg      MCHC 32.3 g/dL      RDW 16.8 %      RDW-SD 48.9 fl      MPV 8.2 fL      Platelets 245 10*3/mm3      Neutrophil % 66.4 %      Lymphocyte % 21.4 %      Monocyte % 8.1 %      Eosinophil % 2.2 %      Basophil % 0.9 %      Immature Grans % 1.0 %      Neutrophils, Absolute 6.99 10*3/mm3      Lymphocytes, Absolute 2.26 10*3/mm3      Monocytes, Absolute 0.85 10*3/mm3      Eosinophils, Absolute 0.23 10*3/mm3      Basophils, Absolute 0.10 10*3/mm3      Immature Grans, Absolute 0.11 10*3/mm3      nRBC 0.0 /100 WBC     POC Glucose Once [[id number]]  (Normal) Collected: 07/07/22 0631    Specimen: Blood Updated: 07/07/22 0645     Glucose 93 mg/dL      Comment: : [id number] DARIEN WIGGINSCrovatMeter ID: [id number]       POC Glucose Once [[id number]]  (Abnormal) Collected: 07/06/22 1934    Specimen: Blood Updated: 07/06/22 2010     Glucose 246 mg/dL      Comment: RN NotifiedOperator: [id number] DARIEN WIGGINSCrovatMeter ID: [id number]       POC Glucose Once [[id number]]  (Abnormal) Collected: 07/06/22 1608    Specimen: Blood Updated: 07/06/22 1632     Glucose 232 mg/dL      Comment: RN NotifiedOperator: [id number] SELINA Russell ID: [id number]              I reviewed the  patient's new clinical results.  I reviewed the patient's new imaging results and agree with the interpretation.     ASSESSMENT/PLAN:   ASSESSMENT: 1.  Anemia with heme positive stool, likely due to GI blood loss.  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  Hemoglobin is improving.  2.  Pneumonia, on antibiotics  3.  History of Flexeril overdosage  4.  End-stage renal disease on hemodialysis  5.  Oropharyngeal dysphagia, speech pathology evaluation noted.  PLAN:  1.  Continue PPI and current supportive care  2.  Follow H&H closely and transfuse as needed  3.  Endoscopic evaluation if hemoglobin drops.  4.  PEG tube placement if dysphagia continues  The risks, benefits, and alternatives of this procedure have been discussed with the patient or the responsible party- the patient understands and agrees to proceed.         Mingo uDarte MD  07/07/22  22:07 CDT

## 2022-07-08 NOTE — OUTREACH NOTE
Call Center TCM Note    Flowsheet Row Responses   [facility] patient discharged from? [place]   Does the patient have one of the following disease processes/diagnoses(primary or secondary)? COPD/Pneumonia   Was the primary reason for admission: Pneumonia   TCM attempt successful? No   Unsuccessful attempts Attempt 1  [Outdated PCP verbal release]          Dayna Schuster RN    7/8/2022, 15:00 CDT

## 2022-07-08 NOTE — HOME HEALTH
"REASON FOR REFERRAL: Patient admitted to  6/25/2022-7/7/2022 and was treated for accidental drug overdose, acute respiratory failure requiring mechanical ventilation, anemia requiring 2 units PRBC, hyperglycemia, ventilator-acquired pneumonia, UTI and sepsis.  She has Stg 3 wound to L shin which nursing will be addressing.  Patient reports she fell out of bed this morning and her spouse helped her back in to bed.  She denies any injury; reports she just rolled out of bed.  DIAGNOSIS: Weakness, abnormal gait  SURGICAL PROCEDURE: N/A  PERTINENT MEDICAL HISTORY: LBP, ESRD requiring dialysis, vitamin D deficiency PVD, obesity, HLD, GERD, HTN, COPD, CAD, HT, MIKE, hx MI, anemia, DM  PRIOR LEVEL OF FUNCTION:Ambulate modif I in home with rollator  PATIENT GOAL FOR THIS EPISODE OF CARE: \"Everything\"  HOME SOCIAL ENVIRONMENT: Lives with spouse in one level apartment without steps to enter    **Dialysis M-W-F"

## 2022-07-08 NOTE — PROGRESS NOTES
SUBJECTIVE:   7/7/2022  Chief Complaint:     Subjective      Patient feels better today.  Denied abdominal pain, nausea or vomiting.  Hemoglobin is 10.8.  Ambulating with PT.  Tolerating soft diet.  Had psychiatry eval. D/C plan noted    History:  Past Medical History:   Diagnosis Date   • Acute blood loss anemia 4/16/2017    Likely due to gastric oozing at this time. - Dr. Duarte (GI) was consulted and has now signed off, will follow up outpatient - pill colonoscopy showed AVMs - continue to monitor   • Acute cystitis with hematuria 3/31/2021    1/13: IV Rocephin 1 gm q 24 1/14 : transitioned  to omnicef 300mg. Urine cultures resulted and did not show growth. Omnicef discontinued as patient is asymptomatic   • Altered mental status 1/9/2022    - AMS on presentation - initial ABG pH 7.3, CO2 34 - Procal 0.29 - UA negative for acute cysitits -CTA head wnl  - Empiric Zosyn and Vancomycin -Lactate 2.5 on admission  - blood cultures no growth at 24 hours     • Anxiety    • CAD (coronary artery disease) 4/24/2021    S/P 3 stents 5/1/2021 for [facility] Continue ASA 81mg & Clopidogrel 75mg Continue Atorvastatin 40mg   • Carotid artery stenosis    • Chronic obstructive lung disease (HCC)    • CKD (chronic kidney disease) stage 4, GFR 15-29 ml/min ([facility])    • CKD (chronic kidney disease), symptom management only, stage 5 ([facility]) 10/5/2020    Results from last 7 days Lab Units 12/15/21 0548 12/14/21 1323 12/14/21 0916 CREATININE mg/dL 3.92* 3.21* 3.32*  Baseline creatinine 2-3 GFR 13-25 GFR 15 Dialysis MWF, sees Dr. Lauren Nephrology consult,, appreciate recommendations Continue Bumex 1mg bid daily Holding Bumex 2mg 4 times a week   • Colonic polyp    • Coronary arteriosclerosis    • Diabetes mellitus (HCC)    • Diabetic neuropathy (HCC)    • Ear pain, right 10/18/2021    - canal trauma due to patient scratching and DMT2 - added cortisporin ear drops   • Elevated troponin 10/12/2021    -most likely from CKD -Trending down -Neg  chest pain   • Generalized abdominal pain 7/1/2022    Could be due to initiation of tube feeds vs dyspepsia vs abdominal cramps related to no PO intake due to intubation vs constipation Continue current laxative regiment  If no bowel movement by this afternoon will consider enema   • GERD (gastroesophageal reflux disease)    • GI bleed 5/13/2021    - GI will follow up outpatient - Protonix 40mg daily - Avoid medical DVT prophy and use mechanical at this time instead. - Continue to monitor - pill colonoscopy results showed AVMs   • History of transfusion    • Hypercholesterolemia    • Hyperosmolar hyperglycemic state (HHS) ([facility]) 6/25/2022    Serum glucose 605 on admission  Anion gap 16 PH 7.37 Bicarb 27.9 Continue fluids  Insulin drip with [facility] protocol  Anion gap closed around 10 AM, received one dose of Levamir subq, will stop insulin drip after 2 hrs     • Hypertension    • Hypokalemia 5/27/2022    Will replace as needed. Will be cautious in the setting of ESRD to avoid need for emergency dialysis   Monitor Qtc intervals on EKG     • Hypomagnesemia 6/27/2021    Monitor and replace   • Morbid obesity ([facility])    • Nephrolithiasis    • On mechanically assisted ventilation ([facility]) 6/26/2022    Vent management and sedation orders placed.  - [facility] intensivist group consulted for vent management appreciate recommendations  - plan to extubate today     • Peripheral vascular disease ([facility])    • Pleural effusion on right 6/26/2022    CXR on 6/30/22 read as a small upper left pulmonary edema vs early pneumonia.  Last Echo 1/2022 EF 61-65 % Continue to monitor  Procal slightly improved, CRP improved On Linezolid and meropenum      • SIRS (systemic inflammatory response syndrome) ([facility]) 1/9/2022    Admission  - WBC 17.78   -   - RR 16 - 1/10: VSS/wnl - CXR - Mild pulm edema - Blood cultures no growth at 24 hours  - Procalcitonin 0.29 - UA : glucose 1000, negative Leucocytes/nitrate - Empiric Zosyn and Vancomcyin    •  Sleep apnea    • Substance abuse (HCC)    • Vitamin D deficiency      Past Surgical History:   Procedure Laterality Date   • CARDIAC CATHETERIZATION N/A 7/14/2020   • CARDIAC CATHETERIZATION N/A 4/23/2021    Procedure: Left Heart Cath;  Surgeon: Melba Romo MD;  Location: [facility] CATH INVASIVE LOCATION;  Service: Cardiology;  Laterality: N/A;   • CARDIAC CATHETERIZATION N/A 4/30/2021    Procedure: Percutaneous Coronary Intervention;  Surgeon: Russell Voss MD;  Location: [facility] CATH INVASIVE LOCATION;  Service: Cardiovascular;  Laterality: N/A;   • CARDIAC CATHETERIZATION N/A 4/30/2021    Procedure: Stent NIKKI coronary;  Surgeon: Russell Voss MD;  Location: [facility] CATH INVASIVE LOCATION;  Service: Cardiovascular;  Laterality: N/A;   • CARDIAC CATHETERIZATION Left 11/13/2021    Procedure: Left Heart Cath;  Surgeon: Niall Rios MD;  Location: [facility] CATH INVASIVE LOCATION;  Service: Cardiology;  Laterality: Left;   • CAROTID STENT Left    • COLONOSCOPY     • COLONOSCOPY N/A 5/14/2021    Procedure: COLONOSCOPY;  Surgeon: Mingo Duarte MD;  Location: [facility] ENDOSCOPY;  Service: Gastroenterology;  Laterality: N/A;   • CORONARY ARTERY BYPASS GRAFT N/A 2013    CABG X 3   • CYSTOSCOPY BLADDER STONE LITHOTRIPSY Bilateral    • ENDOSCOPY N/A 4/12/2021    Procedure: ESOPHAGOGASTRODUODENOSCOPY;  Surgeon: Mingo Duarte MD;  Location: [facility] ENDOSCOPY;  Service: Gastroenterology;  Laterality: N/A;   • ENDOSCOPY N/A 5/14/2021    Procedure: ESOPHAGOGASTRODUODENOSCOPY;  Surgeon: Mingo Duarte MD;  Location: [facility] ENDOSCOPY;  Service: Gastroenterology;  Laterality: N/A;   • ENDOSCOPY N/A 1/28/2022    Procedure: ESOPHAGOGASTRODUODENOSCOPY;  Surgeon: Mingo Duarte MD;  Location: [facility] ENDOSCOPY;  Service: Gastroenterology;  Laterality: N/A;   • INTERVENTIONAL RADIOLOGY PROCEDURE N/A 10/21/2021    Procedure: tunneled central venous catheter placement;  Surgeon: Donnie Robles,  MD;  Location: [facility] ANGIO INVASIVE LOCATION;  Service: Interventional Radiology;  Laterality: N/A;   • INTERVENTIONAL RADIOLOGY PROCEDURE N/A 2022    Procedure: tunneled central venous catheter placement;  Surgeon: Donnie Robles MD;  Location: [facility] ANGIO INVASIVE LOCATION;  Service: Interventional Radiology;  Laterality: N/A;     Family History   Problem Relation Age of Onset   • Heart disease Mother    • Lung cancer Mother    • Heart disease Father    • Heart attack Father    • Diabetes Father    • Heart disease Half-Sister         Dad's side   • Heart disease Brother    • No Known Problems Sister    • No Known Problems Sister    • No Known Problems Sister    • No Known Problems Sister    • No Known Problems Sister    • Pancreatic cancer Half-Sister         Dad's side   • No Known Problems Brother    • No Known Problems Brother    • Heart attack Half-Brother    • Heart attack Half-Brother    • No Known Problems Maternal Grandmother    • No Known Problems Maternal Grandfather    • No Known Problems Paternal Grandmother    • No Known Problems Paternal Grandfather      Social History     Tobacco Use   • Smoking status: Former Smoker     Packs/day: 0.25     Years: 46.00     Pack years: 11.50     Types: Cigarettes     Start date: 1999     Quit date: 2/15/2022     Years since quittin.3   • Smokeless tobacco: Never Used   • Tobacco comment: only smoking 5 a day - quit 2021   Substance Use Topics   • Alcohol use: No   • Drug use: Not Currently     Types: LSD, Marijuana, Methamphetamines     No medications prior to admission.     Allergies:  Adhesive tape, Latex, Nsaids, and Other     CURRENT MEDICATIONS/OBJECTIVE/VS/PE:     Current Medications:     No current facility-administered medications for this encounter.     Current Outpatient Medications   Medication Sig Dispense Refill   • fluconazole (DIFLUCAN) 100 MG tablet Take 1 tablet by mouth on days that you do NOT have hemodialysis. 7  "tablet 0   • [START ON 7/8/2022] fluconazole (DIFLUCAN) 200 MG tablet Take 1 tablet by mouth AFTER hemodialysis on days that you have hemodialysis. 5 tablet 0   • linezolid (ZYVOX) 600 MG tablet Take 1 tablet by mouth Every 12 (Twelve) Hours for 3 doses. 3 tablet 0   • acetaminophen (Tylenol 8 Hour) 650 MG 8 hr tablet Take 1 tablet by mouth Every 8 (Eight) Hours As Needed for Mild Pain . 90 tablet 0   • albuterol (PROVENTIL) (2.5 MG/3ML) 0.083% nebulizer solution Inhale the contents of 1 vial by nebulization Every 4 (Four) Hours As Needed for Wheezing. 75 mL 3   • albuterol sulfate  (90 Base) MCG/ACT inhaler Inhale 2 puffs Every 4 (Four) Hours As Needed for Wheezing. 18 g 1   • aspirin (aspirin) 81 MG EC tablet Take 1 tablet by mouth Daily. 60 tablet 5   • atorvastatin (LIPITOR) 20 MG tablet Take 1 tablet by mouth every night at bedtime. 90 tablet 0   • Blood Glucose Monitoring Suppl ([employer] Blood Glucose Meter) w/Device kit 1 each 3 (Three) Times a Day. 1 kit 3   • bumetanide (BUMEX) 2 MG tablet Take 1 tablet by mouth 4 (Four) Times a Week. Take on non-hemodialysis days. 16 tablet 0   • Capsaicin 0.035 % cream Apply 3 g topically 3 (Three) Times a Day As Needed (ankle pain). 42.5 g 2   • Cetirizine HCl 10 MG capsule Take 1 capsule by mouth Daily.     • clopidogrel (PLAVIX) 75 MG tablet Take 1 tablet by mouth Daily. 30 tablet 5   • Continuous Blood Gluc  (FreeStyle Jade 2 D Lo) device 1 each Continuous. 1 each 0   • Continuous Blood Gluc Sensor (FreeStyle Jade 2 Sensor) misc 1 each Every 14 (Fourteen) Days. 2 each 11   • Diclofenac Sodium (VOLTAREN) 1 % gel gel Apply 4 g topically to the appropriate area as directed 4 (Four) Times a Day As Needed (Ankle pain). 350 g 2   • DULoxetine (CYMBALTA) 20 MG capsule Take 2 capsules by mouth Daily for 90 days. 180 capsule 0   • Easy Touch Insulin Syringe 30G X 5/16\" 0.5 ML misc USE AS DIRECTED WITH LEVEMIR     • EASY TOUCH PEN NEEDLES 31G X 8 MM misc      • " gabapentin (NEURONTIN) 300 MG capsule Take 1 capsule by mouth Daily. And an extra pill M/W/F - dialysis days 45 capsule 2   • glucose blood test strip Use as instructed 100 each 12   • insulin detemir (LEVEMIR) 100 UNIT/ML injection Inject 25 Units under the skin into the appropriate area as directed Every Night. 3 mL 12   • Insulin Lispro, 1 Unit Dial, (HUMALOG) 100 UNIT/ML solution pen-injector Inject 12 Units under the skin into the appropriate area as directed 3 (Three) Times a Day With Meals. 30 mL 12   • Insulin Pen Needle (NovoFine) 30G X 8 MM misc As directed 4 times daily 100 each 11   • Insulin Pen Needle 31G X 8 MM misc Use to inject insulin 4 (Four) Times a Day as directed. 120 each 12   • ipratropium-albuterol (DUO-NEB) 0.5-2.5 mg/3 ml nebulizer Take 3 mL by nebulization 4 (Four) Times a Day.     • isosorbide mononitrate (IMDUR) 30 MG 24 hr tablet Take 1 tablet by mouth Every Morning. 90 tablet 1   • levothyroxine (SYNTHROID, LEVOTHROID) 25 MCG tablet Take 25 mcg by mouth Daily.     • lisinopril (PRINIVIL,ZESTRIL) 20 MG tablet Take 1 tablet by mouth Daily. 90 tablet 0   • Methoxy PEG-Epoetin Beta (MIRCERA IJ) 150 mcg Every 14 (Fourteen) Days.     • Methoxy PEG-Epoetin Beta (MIRCERA IJ) 225 mcg Every 14 (Fourteen) Days.     • metoprolol tartrate (LOPRESSOR) 25 MG tablet Take 1 tablet by mouth Every 12 (Twelve) Hours. 180 tablet 0   • montelukast (SINGULAIR) 10 MG tablet Take 1 tablet by mouth Every Night. 90 tablet 0   • muscle rub (BenGay) 10-15 % cream cream Apply 1 application topically to the appropriate area as directed 4 (Four) Times a Day As Needed for buttock pain. 85 g 1   • nitroglycerin (NITROSTAT) 0.4 MG SL tablet Place 0.4 mg under the tongue Every 5 (Five) Minutes As Needed for Chest Pain (x 3 doses).     • O2 (OXYGEN) Inhale 3 L/min Continuous.     • ondansetron ODT (Zofran ODT) 4 MG disintegrating tablet Place 1 tablet on the tongue Every 8 (Eight) Hours As Needed for Nausea or Vomiting.  30 tablet 0   • oxybutynin XL (DITROPAN-XL) 5 MG 24 hr tablet Take 1 tablet by mouth Daily. 90 tablet 0   • pantoprazole (PROTONIX) 40 MG EC tablet Take 1 tablet by mouth Every Night. 90 tablet 0   • promethazine (PHENERGAN) 25 MG suppository Insert  into the rectum Every 6 (Six) Hours.     • ranolazine (RANEXA) 500 MG 12 hr tablet Take 1 tablet by mouth Every 12 (Twelve) Hours. 180 tablet 0   • Semaglutide (Rybelsus) 7 MG tablet Take 7 mg by mouth Daily. 90 tablet 0   • sevelamer (RENVELA) 800 MG tablet Take 800 mg by mouth 3 (Three) Times a Day With Meals.     • sodium bicarbonate 650 MG tablet Take 1 tablet by mouth.         Objective     Review of Systems:   Review of Systems   Constitutional: Negative for chills, fatigue, fever and unexpected weight change.   HENT: Negative for congestion, ear discharge, hearing loss, nosebleeds and sore throat.    Eyes: Negative for pain, discharge and redness.   Respiratory: Negative for cough, chest tightness, shortness of breath and wheezing.    Cardiovascular: Negative for chest pain and palpitations.   Gastrointestinal: Negative for abdominal distention, abdominal pain, blood in stool, constipation, diarrhea, nausea and vomiting.   Endocrine: Negative for cold intolerance, polydipsia, polyphagia and polyuria.   Genitourinary: Negative for dysuria, flank pain, frequency, hematuria and urgency.   Musculoskeletal: Negative for arthralgias, back pain, joint swelling and myalgias.   Skin: Negative for color change, pallor and rash.   Neurological: Negative for tremors, seizures, syncope, weakness and headaches.   Hematological: Negative for adenopathy. Does not bruise/bleed easily.   Psychiatric/Behavioral: Negative for behavioral problems, confusion, dysphoric mood, hallucinations and suicidal ideas. The patient is not nervous/anxious.        Physical Exam:   Temp:  [96.8 °F (36 °C)-97.3 °F (36.3 °C)] 97.2 °F (36.2 °C)  Heart Rate:  [61-71] 61  Resp:  [18] 18  BP:  (101-154)/(44-70) 101/44     Physical Exam:  General Appearance:    Alert, cooperative, in no acute distress   Head:    Normocephalic, without obvious abnormality, atraumatic   Eyes:            Lids and lashes normal, conjunctivae and sclerae normal, no   icterus, no pallor, corneas clear, PERRLA   Ears:    Ears appear intact with no abnormalities noted   Throat:   No oral lesions, no thrush, oral mucosa moist   Neck:   No adenopathy, supple, trachea midline, no thyromegaly, no     carotid bruit, no JVD   Back:     No kyphosis present, no scoliosis present, no skin lesions,       erythema or scars, no tenderness to percussion or                   palpation,   range of motion normal   Lungs:     Clear to auscultation,respirations regular, even and                   unlabored    Heart:    Regular rhythm and normal rate, normal S1 and S2, no            murmur, no gallop, no rub, no click   Breast Exam:    Deferred   Abdomen:     Normal bowel sounds, no masses, no organomegaly, soft        nontender, nondistended, no guarding, no rebound                 tenderness   Genitalia:    Deferred   Extremities:   Moves all extremities well, no edema, no cyanosis, no              redness   Pulses:   Pulses palpable and equal bilaterally   Skin:   No bleeding, bruising or rash   Lymph nodes:   No palpable adenopathy   Neurologic:   Cranial nerves 2 - 12 grossly intact, sensation intact, DTR        present and equal bilaterally      Results Review:     Lab Results (last 24 hours)     Procedure Component Value Units Date/Time    POC Glucose Once [[id number]]  (Abnormal) Collected: 07/07/22 1019    Specimen: Blood Updated: 07/07/22 1039     Glucose 314 mg/dL      Comment: RN NotifiedOperator: [id number] DOV MCKEONMetjose ID: [id number]       Comprehensive Metabolic Panel [[id number]]  (Abnormal) Collected: 07/07/22 0759    Specimen: Blood Updated: 07/07/22 0829     Glucose 115 mg/dL      BUN 26 mg/dL      Creatinine 3.81 mg/dL       Sodium 131 mmol/L      Potassium 4.3 mmol/L      Chloride 92 mmol/L      CO2 23.0 mmol/L      Calcium 9.5 mg/dL      Total Protein 8.3 g/dL      Albumin 3.40 g/dL      ALT (SGPT) 6 U/L      AST (SGOT) 13 U/L      Alkaline Phosphatase 162 U/L      Total Bilirubin 0.3 mg/dL      Globulin 4.9 gm/dL      A/G Ratio 0.7 g/dL      BUN/Creatinine Ratio 6.8     Anion Gap 16.0 mmol/L      eGFR 12.7 mL/min/1.73      Comment: <15 Indicative of kidney failure       Narrative:      GFR Normal >60  Chronic Kidney Disease <60  Kidney Failure <15      CBC Auto Differential [[id number]]  (Abnormal) Collected: 07/07/22 0759    Specimen: Blood Updated: 07/07/22 0807     WBC 10.54 10*3/mm3      RBC 4.02 10*6/mm3      Hemoglobin 10.8 g/dL      Hematocrit 33.4 %      MCV 83.1 fL      MCH 26.9 pg      MCHC 32.3 g/dL      RDW 16.8 %      RDW-SD 48.9 fl      MPV 8.2 fL      Platelets 245 10*3/mm3      Neutrophil % 66.4 %      Lymphocyte % 21.4 %      Monocyte % 8.1 %      Eosinophil % 2.2 %      Basophil % 0.9 %      Immature Grans % 1.0 %      Neutrophils, Absolute 6.99 10*3/mm3      Lymphocytes, Absolute 2.26 10*3/mm3      Monocytes, Absolute 0.85 10*3/mm3      Eosinophils, Absolute 0.23 10*3/mm3      Basophils, Absolute 0.10 10*3/mm3      Immature Grans, Absolute 0.11 10*3/mm3      nRBC 0.0 /100 WBC     POC Glucose Once [[id number]]  (Normal) Collected: 07/07/22 0631    Specimen: Blood Updated: 07/07/22 0645     Glucose 93 mg/dL      Comment: : [id number] DARIEN WIGGINSVDI Space[employer]eter ID: [id number]       POC Glucose Once [[id number]]  (Abnormal) Collected: 07/06/22 1934    Specimen: Blood Updated: 07/06/22 2010     Glucose 246 mg/dL      Comment: RN NotifiedOperator: [id number] DARIEN WIGGINSVDI [employer] ID: [id number]       POC Glucose Once [[id number]]  (Abnormal) Collected: 07/06/22 1608    Specimen: Blood Updated: 07/06/22 1632     Glucose 232 mg/dL      Comment: RN NotifiedOperator: [id number] SELINA Russell ID: [id number]               I reviewed the patient's new clinical results.  I reviewed the patient's new imaging results and agree with the interpretation.     ASSESSMENT/PLAN:   ASSESSMENT: 1.  Anemia with heme positive stool, likely due to GI blood loss.  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  Hemoglobin is stable.  2.  Pneumonia, on antibiotics  3.  History of Flexeril overdosage  4.  End-stage renal disease on hemodialysis  5.  Oropharyngeal dysphagia, speech pathology evaluation noted.  PLAN:  1.  Continue PPI and current supportive care  2.  Follow H&H closely and transfuse as needed  3.  Endoscopic evaluation if hemoglobin drops.  4.  PEG tube placement if dysphagia continues   5. F/U in clinic in 1 week  The risks, benefits, and alternatives of this procedure have been discussed with the patient or the responsible party- the patient understands and agrees to proceed.         Mingo Duarte MD  07/07/22  22:09 CDT

## 2022-07-08 NOTE — OUTREACH NOTE
Call Center TCM Note    Flowsheet Row Responses   [facility] patient discharged from? [place]   Does the patient have one of the following disease processes/diagnoses(primary or secondary)? COPD/Pneumonia   Was the primary reason for admission: Pneumonia   TCM attempt successful? No   Unsuccessful attempts Attempt 2  [Outdated PCP verbal release. ]          Dayna Schuster RN    7/8/2022, 15:35 CDT

## 2022-07-08 NOTE — HOME HEALTH
--DIALYSIS MWF---  ---NO BP IN R UE---    REASON FOR REFERRAL:  63 Y/O FEMALE HOSPITALIZED AT [facility] 6/25/22-7/7/22 WITH SEVERE SEPSIS, ACCIDENTAL OD OF FLEXERIL, GI BLEED, R LUNG PLEURAL EFFUSION, ANEMIA-RECEIVED 2 UNITS OF PRBCS, ALTERED MENTAL STATUS, PLACED ON VENT X 1 WEEK, VENT ASSOCIATED PNEUMONIA, ACUTE RESPIRATORY FAILURE WITH HYPOXIA/HYPERCAPNIA, YEAST UTI. HAS WOUND ON LEFT LE COVERED WITH DRESSING.  PATIENT IS NOTED TO BE VERY WEAK AND FATIGUES VERY EASILY SINCE HOSPITALIZATION.  SHE IS ALSO VERY UNSTEADY DURING FUNCTIONAL TRANSFERS/FUNCTIONAL MOBILITY USING RW.     PMH:  DM WITH NEUROPATHY, ESRD-DIALYSIS DEPENDENT, COPD-O2 DEPENDENT, CAD-S/P STENTS, ANXIETY, DEPRESSION, HLD, HTN, HYPOTHYROIDISM, CHRONIC ELEVATED TROPONIN, CARTOID ARTERY STENOSIS, CHRONIC ANEMIA, GERD, PVD, SLEEP APNEA, CAROTID STENT, CABG.     PLOF: MINIMAL ASSISTANCE WITH BATHING, DRESSING FROM SPOUSE, ABLE TO GET INTO SHOWER WITH SPOUSE TO ASSIST, INDEPENDENT WITH TOILET TRANSFER/BED MOBILITY.  ABLE TO WALK OUT TO PARKING LOT WITH ROLLATOR WALKER TO GO TO APPTS/DIALYSIS.     HOME ENVIRONMENT:  PATIENT LIVES WITH HER SPOUSE IN ONE LEVEL APARTMENT WITH NO STEPS TO ENTER/EXIT HOME.     PRECAUTIONS: HIGH FALL RISK, NO BP IN RIGHT UE     MD APPTS:  7/11/22 DR ZHENG, 8/16/22 DR PEÑA     DME:  RW, TRANSPORT CHAIRS X2, TRILOGY, NEBULIZER, OXYGEN EQUIPMENT, SHOWER CHAIR IN TUB/SHOWER COMBO WITH CURTAIN AND GRAB BARS, HANDICAPPED HEIGHT TOILET, ROLLATOR WALKER.     AROM B UES: WFL     STRENGTH B UES: 4-/5 GROSSLY THROUGHOUT.     HAND DOMINANCE: LEFT HAND DOMINANT, B  STRENGTHS: 4-/5.     REHAB POTENTIAL: GOOD FOR GOALS     WISH TO ADDRESS BATH/DRESSING WITH OT: NO     PATIENT'S GOAL: REGAIN STRENGTH     MEDICAL NECISSITY:  PATIENT PRESENTS WITH WEAKNESS, HIGH FALL RISK, AND LIMITED ACTIVITY TOLERANCE AFTER COMPLICATED HOSPITALIZATION FOR SEVERE SEPSIS, ACCIDENTAL OD OF FLEXERIL, GI BLEED, R LUNG PLEURAL EFFUSION, ANEMIA-RECEIVED 2  UNITS OF PRBCS, ALTERED MENTAL STATUS, PLACED ON VENT X 1 WEEK, VENT ASSOCIATED PNEUMONIA, ACUTE RESPIRATORY FAILURE WITH HYPOXIA/HYPERCAPNIA, YEAST UTI.  SHE REQUIRES SKILLED HOME BASED OT SERVICES FOR FUNCTIONAL TRANSFER TRAINING FOR ADLS, SAFETY EDUCATION/TRAINING, B UE THER. EX. FOR STRENGTHENING, PATIENT EDUCATION FOR B UE STRENGTHENING HEP, STANDING TOLERANCE TRAINING FOR ADLS.

## 2022-07-09 ENCOUNTER — HOME CARE VISIT (OUTPATIENT)
Dept: HOME HEALTH SERVICES | Facility: CLINIC | Age: 62
End: 2022-07-09

## 2022-07-09 VITALS
WEIGHT: 278 LBS | BODY MASS INDEX: 51.16 KG/M2 | DIASTOLIC BLOOD PRESSURE: 90 MMHG | RESPIRATION RATE: 16 BRPM | HEART RATE: 88 BPM | TEMPERATURE: 97.4 F | SYSTOLIC BLOOD PRESSURE: 146 MMHG | HEIGHT: 62 IN | OXYGEN SATURATION: 99 %

## 2022-07-09 PROCEDURE — G0299 HHS/HOSPICE OF RN EA 15 MIN: HCPCS

## 2022-07-09 NOTE — HOME HEALTH
SN Evaluation today for wound care to left leg, stage 2 pressure wound. Pt stated she has had wound on her leg for over a year and it hasn't gotten any better. Pt was in the hospital from 6/25-7/7/22 due to altered mental status due to possible drug overdose, pt had taken to many Flexiril and was intubated in ER and acquired pneumonia. Pt also had UTI. Pt stated she had put her extra pills in her bedtime planner and know what her medications are. Pt spouse stated she would not be putting any extras in her night time planner. CG will monitor pt medication closer. Pt has a wound to LLE, that has yellow slough noted, cleaned with NS and gauze, applied polymem to wound bed and covered with medipore tape. Pt tolerated well. SN discussed looking at feet daily due to diabetes, discussed pnemonia, O2 safety and went over medication list.     Next SNV: wound care to LLE and teaching.

## 2022-07-10 VITALS
DIASTOLIC BLOOD PRESSURE: 76 MMHG | OXYGEN SATURATION: 100 % | RESPIRATION RATE: 19 BRPM | TEMPERATURE: 96.2 F | SYSTOLIC BLOOD PRESSURE: 132 MMHG | HEART RATE: 71 BPM

## 2022-07-11 ENCOUNTER — TRANSITIONAL CARE MANAGEMENT TELEPHONE ENCOUNTER (OUTPATIENT)
Dept: CALL CENTER | Facility: HOSPITAL | Age: 62
End: 2022-07-11

## 2022-07-11 NOTE — OUTREACH NOTE
Call Center TCM Note    Flowsheet Row Responses   [facility] patient discharged from? [place]   Does the patient have one of the following disease processes/diagnoses(primary or secondary)? COPD/Pneumonia   Was the primary reason for admission: Pneumonia   TCM attempt successful? No  [no verbal release but CM notes indicate dtr Yamileth active in care]   Unsuccessful attempts Attempt 3  [attempted pt and dtr Yamileth]   Does the patient have a primary care provider?  Yes   Comments regarding PCP Hospital PCP FOLLOW UP APPOINTMENT IS 7/11/22@315pm          Roma Lopez RN    7/11/2022, 12:24 EDT

## 2022-07-11 NOTE — PAYOR COMM NOTE
"[place]  [facility]  Case Management Extender  [phone] phone  [phone] fax      Auth# [id number]    Yamileth Slater (62 y.o. Female)             Date of Birth   1960    Social Security Number       Address   [address] [place] [address]    Home Phone   [phone]    MRN   [id number]       Restorationist   None    Marital Status                               Admission Date   6/25/22    Admission Type   Emergency    Admitting Provider   Charlene Castro MD    Attending Provider       Department, Room/Bed   [facility] [address] [place], 304/1       Discharge Date   7/7/2022    Discharge Disposition   Home-Health Care [facility]    Discharge Destination                               Attending Provider: (none)   Allergies: Adhesive Tape, Nsaids, Latex, Other    Isolation: None   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                 Active Insurance as of 6/25/2022     Primary Coverage     Payor Plan Insurance Group Employer/Plan Group    ANTHEM MEDICARE REPLACEMENT ANTHEM MEDICARE ADVANTAGE [id number]     Payor Plan Address Payor Plan Phone Number Payor Plan Fax Number Effective Dates    [address] [phone]  1/1/2022 - None Entered    [facility] [id number]       Subscriber Name Subscriber Birth Date Member ID       YAMILETH SLATER 1960 [id number]           Secondary Coverage     Payor Plan Insurance Group Employer/Plan Group    [place]      Payor Plan Address Payor Plan Phone Number Payor Plan Fax Number Effective Dates    [address] [phone]  6/28/2019 - None Entered    [facility] [id number]       Subscriber Name Subscriber Birth Date Member ID       [id number] " ARMAAN 1960 [id number]                 Emergency Contacts      (Rel.) Home Phone Work Phone Mobile Phone    Huy Slater (Spouse) [phone] -- [phone]    Yamileth Chavez (Daughter) [phone] -- [phone]    Suzanne Rivera (Daughter) [phone] -- [phone]               Discharge Summary      Jerman Coffman MD at 22 1531     Attestation signed by Radha Argueta MD at 22 1723    I saw and evaluated the patient with the resident.  I discussed the case with the resident and agree with the findings and plan as documented in the residents note.    GEN:  NAD  CV: S1S2  LUNG:  CTAB  ABD:  +BS, s/nt/nd - morbidly obese  EXT:  +pulses    This document has been electronically signed by Radha Argueta MD on 2022 17:22 CDT                      DISCHARGE SUMMARY    PATIENT NAME: Yamileth Slater       PHYSICIAN: Jerman Coffman MD  : 1960  MRN: [id number]    ADMITTED: 2022     DISCHARGED: 2022    ADMISSION DIAGNOSES:  Active Hospital Problems    Diagnosis  POA   • **Ventilator associated pneumonia (HCC) [J95.851]  Yes   • Yeast UTI [B37.49]  Unknown   • Positive fecal occult blood test [R19.5]  Unknown   • Anemia [D64.9]  Yes   • ESRD on hemodialysis (HCC) [N18.6, Z99.2]  Not Applicable   • Type 2 diabetes mellitus with autonomic neuropathy (HCC) [E11.43]  Yes   • Hypothyroidism [E03.9]  Yes   • Hypertension [I10]  Yes   • Coronary artery disease involving native coronary artery of native heart without angina pectoris [I25.10]  Yes   • COPD (chronic obstructive pulmonary disease) (HCC) [J44.9]  Yes      Resolved Hospital Problems    Diagnosis Date Resolved POA   • Generalized abdominal pain [R10.84] 2022 Unknown   • Hypomagnesemia [E83.42] 2022 Unknown   • Accidental drug overdose [T50.901A] 2022 Yes   • On mechanically assisted ventilation (HCC) [Z99.11] 2022 Not Applicable   • Severe sepsis without septic shock (CODE) (HCC)  [R65.20] 06/27/2022 Yes   • Pleural effusion on right [J90] 07/02/2022 Yes   • Acute respiratory failure with hypoxia and hypercapnia (HCC) [J96.01, J96.02] 07/07/2022 Yes   • Altered mental status [R41.82] 07/03/2022 Yes   • Hyperosmolar hyperglycemic state (HHS) (HCC) [E11.00, E11.65] 06/27/2022 Yes   • Hypokalemia [E87.6] 06/30/2022 Yes   • Acute cystitis with hematuria [N30.01] 07/07/2022 Unknown     DISCHARGE DIAGNOSES:   Active Hospital Problems    Diagnosis  POA   • **Ventilator associated pneumonia (HCC) [J95.851]  Yes   • Yeast UTI [B37.49]  Unknown   • Positive fecal occult blood test [R19.5]  Unknown   • Anemia [D64.9]  Yes   • ESRD on hemodialysis ([facility]) [N18.6, Z99.2]  Not Applicable   • Type 2 diabetes mellitus with autonomic neuropathy ([facility]) [E11.43]  Yes   • Hypothyroidism [E03.9]  Yes   • Hypertension [I10]  Yes   • Coronary artery disease involving native coronary artery of native heart without angina pectoris [I25.10]  Yes   • COPD (chronic obstructive pulmonary disease) ([facility]) [J44.9]  Yes      Resolved Hospital Problems    Diagnosis Date Resolved POA   • Generalized abdominal pain [R10.84] 07/02/2022 Unknown   • Hypomagnesemia [E83.42] 07/01/2022 Unknown   • Accidental drug overdose [T50.901A] 07/07/2022 Yes   • On mechanically assisted ventilation (HCC) [Z99.11] 07/03/2022 Not Applicable   • Severe sepsis without septic shock (CODE) ([facility]) [R65.20] 06/27/2022 Yes   • Pleural effusion on right [J90] 07/02/2022 Yes   • Acute respiratory failure with hypoxia and hypercapnia (HCC) [J96.01, J96.02] 07/07/2022 Yes   • Altered mental status [R41.82] 07/03/2022 Yes   • Hyperosmolar hyperglycemic state (HHS) (HCC) [E11.00, E11.65] 06/27/2022 Yes   • Hypokalemia [E87.6] 06/30/2022 Yes   • Acute cystitis with hematuria [N30.01] 07/07/2022 Unknown       SERVICE: Family Medicine Residency  Attending: Dr. Argueta  Resident: Jerman Coffman MD    CONSULTS:   Consult Orders (all) (From admission, onward)     Start      Ordered    07/05/22 1221  Inpatient Psychiatrist Consult  Once        Specialty:  Psychiatry  Provider:  Toney Brown II, MD    07/05/22 1220    07/05/22 1021  Inpatient Case Management  Consult  Once        Provider:  (Not yet assigned)    07/05/22 1020    07/01/22 1525  Inpatient Gastroenterology Consult  Once        Specialty:  Gastroenterology  Provider:  Mingo Duarte MD    07/01/22 1525    06/28/22 1351  Inpatient Cardiology Consult  Once        Specialty:  Cardiology  Provider:  Margarito Davis MD    06/28/22 1350    06/25/22 2257  Inpatient Intensivist Consult  Once,   Status:  Canceled        Specialty:  Intensive Care  Provider:  (Not yet assigned)    06/25/22 2257 06/25/22 2246  Inpatient Intensivist Consult  Once,   Status:  Canceled        Specialty:  Intensive Care  Provider:  (Not yet assigned)    06/25/22 2247 06/25/22 1918  Family Practice - Resident (on-call MD unless specified)  Once        Specialty:  Family Medicine  Provider:  (Not yet assigned)    06/25/22 1917 06/25/22 1917  Nephrology - PNA (on-call MD from group unless specified)  Once        Specialty:  Nephrology  Provider:  Timothy Valle MD    06/25/22 1917 06/25/22 1910  Inpatient Diabetes Educator Consult  Once        Provider:  (Not yet assigned)    06/25/22 1909                PROCEDURES:     Intubated 6/25/22 Dr. Chatman - extubated 7-3-22    HISTORY OF PRESENT ILLNESS:     Yamileth Slater is a 62 y.o. female with a CMH of ESRD on dialysis MWF, diabetes, morbid obesity, CAD s/p stents, hyperlipidemia, COPD, hypothyroidism and chronic elevated troponin.  Patient presented to the ER via EMS due to altered mental status with concerns of drug overdose.  It was reported by EMS that patient had ingested 10 tablets of Flexeril.      Patient was emergently intubated to protect the airway.  Patient received activated charcoal sorbitol x1.  Labs were significant with glucose of  605, anion gap 16, hemoglobin of 6.9 and elevated troponin.  ABG showed pH of 7.37, bicarb 27.9.      I spoke with patient's daughter Yamileth Chavez who reports that patient may have accidentally used multiple doses of Flexeril because she had not been feeling well and she did miss her dialysis appointment for today.    Per Dr. Castro's H&P 6/25/22    DIAGNOSTIC DATA:     Lab Results (last 48 hours)     Procedure Component Value Units Date/Time    POC Glucose Once [[id number]]  (Abnormal) Collected: 07/07/22 1019    Specimen: Blood Updated: 07/07/22 1039     Glucose 314 mg/dL      Comment: RN NotifiedOperator: [id number] DOV GONGORAFERMeter ID: [id number]       Comprehensive Metabolic Panel [[id number]]  (Abnormal) Collected: 07/07/22 0759    Specimen: Blood Updated: 07/07/22 0829     Glucose 115 mg/dL      BUN 26 mg/dL      Creatinine 3.81 mg/dL      Sodium 131 mmol/L      Potassium 4.3 mmol/L      Chloride 92 mmol/L      CO2 23.0 mmol/L      Calcium 9.5 mg/dL      Total Protein 8.3 g/dL      Albumin 3.40 g/dL      ALT (SGPT) 6 U/L      AST (SGOT) 13 U/L      Alkaline Phosphatase 162 U/L      Total Bilirubin 0.3 mg/dL      Globulin 4.9 gm/dL      A/G Ratio 0.7 g/dL      BUN/Creatinine Ratio 6.8     Anion Gap 16.0 mmol/L      eGFR 12.7 mL/min/1.73      Comment: <15 Indicative of kidney failure       Narrative:      GFR Normal >60  Chronic Kidney Disease <60  Kidney Failure <15      CBC Auto Differential [[id number]]  (Abnormal) Collected: 07/07/22 0759    Specimen: Blood Updated: 07/07/22 0807     WBC 10.54 10*3/mm3      RBC 4.02 10*6/mm3      Hemoglobin 10.8 g/dL      Hematocrit 33.4 %      MCV 83.1 fL      MCH 26.9 pg      MCHC 32.3 g/dL      RDW 16.8 %      RDW-SD 48.9 fl      MPV 8.2 fL      Platelets 245 10*3/mm3      Neutrophil % 66.4 %      Lymphocyte % 21.4 %      Monocyte % 8.1 %      Eosinophil % 2.2 %      Basophil % 0.9 %      Immature Grans % 1.0 %      Neutrophils, Absolute 6.99 10*3/mm3      Lymphocytes,  Absolute 2.26 10*3/mm3      Monocytes, Absolute 0.85 10*3/mm3      Eosinophils, Absolute 0.23 10*3/mm3      Basophils, Absolute 0.10 10*3/mm3      Immature Grans, Absolute 0.11 10*3/mm3      nRBC 0.0 /100 WBC     POC Glucose Once [[id number]]  (Normal) Collected: 07/07/22 0631    Specimen: Blood Updated: 07/07/22 0645     Glucose 93 mg/dL      Comment: : [id number] DARIEN WIGGINSAHMeter ID: [id number]       POC Glucose Once [[id number]]  (Abnormal) Collected: 07/06/22 1934    Specimen: Blood Updated: 07/06/22 2010     Glucose 246 mg/dL      Comment: RN NotifiedOperator: [id number] DARIEN JAREDMeter ID: [id number]       POC Glucose Once [[id number]]  (Abnormal) Collected: 07/06/22 1608    Specimen: Blood Updated: 07/06/22 1632     Glucose 232 mg/dL      Comment: RN NotifiedOperator: [id number] MARKS ALEXISMeter ID: [id number]       POC Glucose Once [[id number]]  (Abnormal) Collected: 07/06/22 1402    Specimen: Blood Updated: 07/06/22 1425     Glucose 172 mg/dL      Comment: RN NotifiedOperator: [id number] [facility] ID: [id number]       POC Glucose Once [[id number]]  (Abnormal) Collected: 07/06/22 1045    Specimen: Blood Updated: 07/06/22 1057     Glucose 299 mg/dL      Comment: RN NotifiedOperator: [id number] MARKS ALEXISMeter ID: [id number]       Magnesium [[id number]]  (Normal) Collected: 07/06/22 0551    Specimen: Blood Updated: 07/06/22 1056     Magnesium 2.3 mg/dL     POC Glucose Once [[id number]]  (Abnormal) Collected: 07/06/22 0611    Specimen: Blood Updated: 07/06/22 0626     Glucose 293 mg/dL      Comment: RN NotifiedOperator: [id number] DARIEN JAREDMeter ID: [id number]       Comprehensive Metabolic Panel [[id number]]  (Abnormal) Collected: 07/06/22 0551    Specimen: Blood Updated: 07/06/22 0621     Glucose 319 mg/dL      BUN 40 mg/dL      Creatinine 4.99 mg/dL      Sodium 132 mmol/L      Potassium 4.6 mmol/L      Chloride 93 mmol/L      CO2 24.0 mmol/L      Calcium 9.2 mg/dL       Total Protein 8.0 g/dL      Albumin 3.50 g/dL      ALT (SGPT) 5 U/L      AST (SGOT) 14 U/L      Alkaline Phosphatase 220 U/L      Total Bilirubin <0.2 mg/dL      Globulin 4.5 gm/dL      A/G Ratio 0.8 g/dL      BUN/Creatinine Ratio 8.0     Anion Gap 15.0 mmol/L      eGFR 9.2 mL/min/1.73      Comment: <15 Indicative of kidney failure       Narrative:      GFR Normal >60  Chronic Kidney Disease <60  Kidney Failure <15      C-reactive Protein [[id number]]  (Abnormal) Collected: 07/06/22 0551    Specimen: Blood Updated: 07/06/22 0619     C-Reactive Protein 7.72 mg/dL     CBC Auto Differential [[id number]]  (Abnormal) Collected: 07/06/22 0551    Specimen: Blood Updated: 07/06/22 0601     WBC 8.99 10*3/mm3      RBC 3.73 10*6/mm3      Hemoglobin 10.0 g/dL      Hematocrit 32.2 %      MCV 86.3 fL      MCH 26.8 pg      MCHC 31.1 g/dL      RDW 16.6 %      RDW-SD 51.6 fl      MPV 8.2 fL      Platelets 262 10*3/mm3      Neutrophil % 66.8 %      Lymphocyte % 21.2 %      Monocyte % 6.7 %      Eosinophil % 2.7 %      Basophil % 1.0 %      Immature Grans % 1.6 %      Neutrophils, Absolute 6.01 10*3/mm3      Lymphocytes, Absolute 1.91 10*3/mm3      Monocytes, Absolute 0.60 10*3/mm3      Eosinophils, Absolute 0.24 10*3/mm3      Basophils, Absolute 0.09 10*3/mm3      Immature Grans, Absolute 0.14 10*3/mm3      nRBC 0.0 /100 WBC     POC Glucose Once [[id number]]  (Abnormal) Collected: 07/05/22 1931    Specimen: Blood Updated: 07/05/22 2012     Glucose 318 mg/dL      Comment: RN NotifiedOperator: [id number] DARIEN JAREDMetjose ID: [id number]       POC Glucose Once [[id number]]  (Abnormal) Collected: 07/05/22 1612    Specimen: Blood Updated: 07/05/22 1623     Glucose 291 mg/dL      Comment: RN NotifiedOperator: [id number] MARKS ALEXISMeter ID: [id number]           CT Abdomen Pelvis Without Contrast    Result Date: 6/30/2022  CT ABDOMEN PELVIS WITHOUT CONTRAST INDICATION: concern for abscess vs diverticulitis, R41.82 Altered mental  status, unspecified J96.00 Acute respiratory failure, unspecified whether with hypoxia or hypercapnia J81.0 Acute pulmonary edema N18.6 End stage renal disease R73.9 Hyperglycemia, unspecified COMPARISON: CT abdomen pelvis without contrast 12/18/2020 TECHNIQUE: Spiral CT images were acquired of the abdomen and pelvis with multiplanar reconstructions without the administration of oral or IV contrast. CONTRAST: None FINDINGS: Limited evaluation due to patient body habitus with contact with the CT gantry producing extensive streak artifact. INFERIOR THORAX: Coronary atherosclerosis or stents. Cardiomegaly. Sternotomy wires. Small right pleural effusion. Groundglass opacities are present at bilateral lung bases with consolidative opacities and bandlike opacities. LIVER: Granulomas. GALLBLADDER: There is haziness surrounding the gallbladder which could also relate to motion artifact or streak artifact. There are possible gallstones. SPLEEN: Granulomas. PANCREAS: Unremarkable ADRENAL GLANDS: Unremarkable KIDNEYS: Mild bilateral perinephric stranding. URETERS: Unremarkable BLADDER: Decompressed with Forman REPRODUCTIVE: Hysterectomy GASTROINTESTINAL: Nasogastric tube with tip terminating in stomach. Unremarkable appendix. VASCULAR: Severe calcific atherosclerosis. LYMPH NODES: No lymphadenopathy per CT criteria. PERITONEUM/RETROPERITONEUM: Unremarkable. OSSEOUS STRUCTURES: Degenerative change bilateral SI joints. Straightening of the lumbar lordosis with lumbar spondylosis and degenerative disc disease. SOFT TISSUES: Unremarkable     1. No evidence of abscess or diverticulitis. 2. Haziness surrounding the gallbladder in a region of the scan that it exhibits motion artifact. This could relate to motion or streak artifact. However if there is concern for possible cholecystitis further evaluation could be obtained with gallbladder ultrasound or HIDA. 3. Possible cholelithiasis. 4. Cardiomegaly. 5. Small right pleural effusion.  6. Groundglass opacities, consolidative opacities, and bandlike opacities at the bilateral lung bases, which could represent pneumonia, atelectasis, or edema. 7. Degenerative change bilateral SI joints. 8. Lumbar spondylosis. 9. Degenerative disc disease. Electronically signed by:  Yoon Malloy MD  6/30/2022 4:30 PM CDT Workstation: [id number]    XR Tibia Fibula 2 View Left    Result Date: 6/29/2022  EXAM: XR TIBIA FIBULA 2 VIEWS ORDERING PROVIDER: DAVID ALMENDAREZ CLINICAL HISTORY: Osteomyelitis assessment COMPARISON STUDY: TECHNIQUE: Two views of the left lower leg FINDINGS: There is no acute displaced fracture.  The alignment is anatomic.  No soft tissue abnormality, or radiopaque foreign body is seen. There is no joint effusion. No definite osteolysis Scattered vascular clips in the leg     No acute displaced fracture, or traumatic subluxation based on current assessment. No definite osteolysis to indicate radiographic evidence of osteomyelitis. If there is concern for subtle involvement due to osteomyelitis, correlation with MRI or triple phase bone scan is recommended due to suboptimal sensitivity of radiographic assessment. Electronically signed by:  Richmond Bernal MD  6/29/2022 7:40 PM CDT Workstation: [id number]    CT Head Without Contrast    Result Date: 6/25/2022  EXAM DESCRIPTION: CT HEAD WO CONTRAST CLINICAL HISTORY: ams TECHNIQUE: 5mm slice thickness axial images through the brain were performed in bone and soft tissue windows in the absence of intravenous contrast.  This exam was performed according to our departmental dose-optimization program which includes use of Automated Exposure Control, adjustment of the mA and/or kV according to patient size and/or use of iterative reconstruction technique. COMPARISON: None. FINDINGS: There is diffuse age-appropriate atrophy seen throughout the brain parenchyma. Mild periventricular white matter changes are seen to be present and there is mild ex vacuo dilatation of the  ventricular system. There is no intra-axial or extra-axial bleed. There is no mass or mass effect. The mastoid air cells are patent. No fracture is present. Mucosal thickening in the nostrils ethmoid air cells.     No acute intracranial pathology. Electronically signed by:  Anny Morrison MD, FREDERICK  6/25/2022 7:20 PM CDT Workstation: [id number]    XR Chest 1 View    Result Date: 7/2/2022  PROCEDURE: XR CHEST 1 VIEW, 7/1/2022 5:18 AM CDT CLINICAL INDICATION:    intubate, eval for new opacities, R41.82 Altered mental status, unspecified J96.00 Acute respiratory failure, unspecified whether with hypoxia or hypercapnia J81.0 Acute pulmonary edema N18.6 End stage renal disease R73.9 Hyperglycemia, unspecified. COMPARISON: 6/30/2022 TECHNIQUE:  AP portable radiograph of chest FINDINGS: Poor visualization of level endotracheal tube due to technical factors and overlapping tubing and wiring. Numerous EKG leads obscure evaluation of enteric tube or tubes. An enteric tube or tubes extend to level of left upper quadrant, tip projects outside field-of-view. Right IJ catheter tip at level of lower SVC. Left neck catheter tip at level of confluence of brachiocephalic vein and SVC. Large cardiac silhouette. Hazy opacification in the bilateral mid and lower lungs. No pneumothorax identified.     Poor visualization of level of distal endotracheal tube on this film due to technical factors and overlapping tubing and wiring. Large cardiac silhouette with bilateral hazy airspace disease in lower lungs. Numerous overlying EKG wires obscure evaluation of the lines and tubes on this film. Electronically signed by:  Jm Mckeon MD  7/2/2022 12:05 AM CDT Workstation: [id number]    XR Chest 1 View    Result Date: 6/30/2022  PROCEDURE: Single chest view portable REASON FOR EXAM:intubated, R41.82 Altered mental status, unspecified J96.00 Acute respiratory failure, unspecified whether with hypoxia or hypercapnia J81.0 Acute pulmonary edema  N18.6 End stage renal disease R73.9 Hyperglycemia, unspecified FINDINGS: Comparison exam dated June 28, 2022. Postsurgical changes with median sternotomy. ET tube with tip 3.29 cm above valentina. NG tube with tip below level of diaphragm. Left jugular central venous catheter with tip in the region of the SVC. Right jugular double lumen large bore central venous catheter with tip in the SVC. Cardiomegaly. Vague left upper lung field perihilar interstitial groundglass opacity. Lungs are otherwise clear.. Pleural spaces are normal. No acute osseous abnormality.     1.  Tubes and lines as described above. 2.  Cardiomegaly. 3.  Vague left upper lung field perihilar interstitial groundglass opacity suspicious for very mild atypical pulmonary edema versus early pneumonia. Electronically signed by:  Young Johnson MD  6/30/2022 8:08 AM CDT Workstation: [id number]    XR Chest 1 View    Result Date: 6/28/2022  Rest x-ray single view. CLINICAL INDICATION: Shortness of breath . Intubated COMPARISON: Chest June 25, 2022 FINDINGS: Cardiac silhouette is enlarged in size. Sternal sutures, from prior cardiac surgery. Pulmonary vascularity is increased. Tunneled central venous catheter right jugular approach with catheter tip in superior vena cava in satisfactory position. Left-sided jugular venous catheter with catheter tip in superior vena cava. Endotracheal tube with tip 3.6 cm above the bifurcation of the valentina in satisfactory position. Small right-sided pleural effusion, unchanged.     Cardiomegaly . Small right-sided effusion. Pulmonary vessel prominence is more pronounced than on prior exam. Electronically signed by:  Hugo Russo MD  6/28/2022 10:56 AM CDT Workstation: [id number]    XR Chest 1 View    Result Date: 6/25/2022  XR CHEST 1 VIEW INDICATION: 62 years Female; line placement LEFT IJ, R41.82 Altered mental status, unspecified J96.00 Acute respiratory failure, unspecified whether with hypoxia or hypercapnia J81.0 Acute  pulmonary edema N18.6 End stage renal disease R73.9 Hyperglycemia, unspecified TECHNIQUE: 1 view of the chest was performed. Comparison: 6/25/2022, 9:41 PM. FINDINGS: Lungs/Pleura: Small bilateral pleural effusions are unchanged. Patchy airspace and interstitial opacities in both lungs predominantly in the bilateral lower lobes appear slightly worsened from previous study. No pneumothorax. Heart/Mediastinum: Cardiomegaly. Bones: Median sternotomy wires are intact. Soft tissues: Unremarkable. Lines/Tubes: The tip of the right central venous catheter is at the cavoatrial junction. The tip of the endotracheal tube is 3.7 cm above the valentina. The tip of the enteric tube is at the distal esophagus; recommend advancement. The tip of the left IJ catheter is in the upper SVC, approximately 4 cm above the cavoatrial junction.. Incidental findings: None.     1.  The tip of the left IJ catheter is in the upper SVC, approximately 4 cm above the cavoatrial junction. 2.  The tip of the right central venous catheter is at the cavoatrial junction. 3.  The tip of the endotracheal tube is 3.7 cm above the valentina. 4.  The tip of the enteric tube is at the distal esophagus; recommend advancement. 5.  Small bilateral pleural effusions are unchanged. Patchy airspace and interstitial opacities in both lungs predominantly in the bilateral lower lobes appear slightly worsened from previous study. Electronically signed by:  Tiff Ga  6/25/2022 11:18 PM CDT Workstation: [id number]    XR Chest 1 View    Result Date: 6/25/2022  XR CHEST 1 VIEW INDICATION: 62 years Female; Confirm ET Tube Placement, R41.82 Altered mental status, unspecified J96.00 Acute respiratory failure, unspecified whether with hypoxia or hypercapnia J81.0 Acute pulmonary edema N18.6 End stage renal disease R73.9 Hyperglycemia, unspecified TECHNIQUE: 1 view of the chest was performed. Comparison: 6/25/2022, 6:24 PM. FINDINGS: Lungs/Pleura: Interval improvement of the  "bilateral pleural effusions. Interval improvement of the pulmonary edema. No pneumothorax. Heart/Mediastinum: Cardiomegaly. Bones: Unremarkable. Soft tissues: Unremarkable. Lines/Tubes: The tip of the right central venous catheter is at the cavoatrial junction. The tip of the endotracheal tube is 3.4 cm is above the valentina. The tip of the enteric tube is at the distal esophagus; recommend advancement. Incidental findings: None.     1.  The tip of the right central venous catheter is at the cavoatrial junction. 2.  The tip of the endotracheal tube is 3.4 cm is above the valentina. 3.  The tip of the enteric tube is at the distal esophagus; recommend advancement. 4.  Cardiomegaly. 5.  Interval improvement of the bilateral pleural effusions and pulmonary edema. Electronically signed by:  Tiff Ga  6/25/2022 10:23 PM CDT Workstation: [id number]    XR Chest 1 View    Result Date: 6/25/2022  INDICATION: ams EXAMINATION/TECHNIQUE: X-RAY - XR CHEST 1 VIEW COMPARISON: Chest x-ray 4/15/2022 ____________________________________________ FINDINGS:  LINES/DEVICES: Endotracheal tube tip is 3.2 cm from valentina. Enteric tube is not visualized distally due to poor penetration. Recommend follow-up abdominal film to localize the tip. The dialysis catheter is stable in position LUNGS: There is pulmonary edema. There is a small right effusion. Left lung is clear. There is no pneumothorax MEDIASTINUM AND CARDIOVASCULAR STRUCTURES: Cardiac silhouette is prominent. There is vascular congestion. BONES AND SOFT TISSUES: Unremarkable.     Mild CHF with edema and small right effusion. Distal enteric tube is not visualized. Electronically signed by:  Pablo Rubio MD  6/25/2022 7:34 PM CDT Workstation: [id number]    XR Abdomen KUB    Addendum Date: 7/1/2022     ADDENDUM ADDENDUM #1 Addendum report: Received question as to whether or not \"tube is in the right place\". Since tube is not entirely visualized on this study exact position of the tube is " difficult to ascertain. As stated in the report follow-up is recommended Electronically signed by:  Pablo Rubio MD  7/1/2022 3:58 PM CDT Workstation: [id number]     Result Date: 7/1/2022  INDICATION: Cortrak placement, R41.82 Altered mental status, unspecified J96.00 Acute respiratory failure, unspecified whether with hypoxia or hypercapnia J81.0 Acute pulmonary edema N18.6 End stage renal disease R73.9 Hyperglycemia, unspecified EXAMINATION/TECHNIQUE: Portable view upper abdomen COMPARISON: None. ____________________________________________ FINDINGS:  Feeding tube is positioned well below the diaphragm. The images do not include the right upper quadrant. The feeding tube crosses the midline and appears to curve into the right upper quadrant. It may be positioned near the gastric antrum. Follow-up is recommended     The entire distal feeding tube is not included on this single portable image. Tip appears to project near the gastric antrum Electronically signed by:  Pablo Rubio MD  6/30/2022 7:45 PM CDT Workstation: [id number]    XR Abdomen KUB    Result Date: 6/30/2022  EXAM:   XR Abdomen, 1 View CLINICAL HISTORY:   The patient is 62 years old and is Female; Feeding tube placement, R41.82 Altered mental status, unspecified J96.00 Acute respiratory failure, unspecified whether with hypoxia or hypercapnia J81.0 Acute pulmonary edema N18.6 End stage renal disease R73.9 Hyperglycemia, unspecified TECHNIQUE:   Frontal supine view of the abdomen/pelvis. COMPARISON:   exam earlier today FINDINGS:   GASTROINTESTINAL TRACT:  Unremarkable.  No dilation.   BONES/JOINTS:  Moderate DJD.   TUBES, LINES AND DEVICES:  Feeding tube extends into the proximal duodenum.       Feeding tube extends into the proximal duodenum. Electronically signed by:  Nirmal Jimenez MD  6/30/2022 9:51 PM CDT Workstation: [id number]    XR Abdomen KUB    Result Date: 6/29/2022  RADIOGRAPH ABDOMEN KUB INDICATION: assess stool burden, R41.82 Altered mental  status, unspecified J96.00 Acute respiratory failure, unspecified whether with hypoxia or hypercapnia J81.0 Acute pulmonary edema N18.6 End stage renal disease R73.9 Hyperglycemia, unspecified COMPARISON: [facility] 10/15/2021 TECHNIQUE: Single frontal radiograph was obtained of the mid/lower abdomen and pelvis. FINDINGS: Due to the patient's size, only the mid and left abdomen were imaged. The right colon is not visualized. There is a nasogastric feeding tube terminating in the stomach. Bowel gas pattern is unremarkable. Penetration to x-rays limited due to the patient's size. There is no significant amount of stool visualized in the transverse, descending, sigmoid colon or rectum. There is severe aortoiliac atherosclerosis. There is osseous degenerative change.     1. Ascending colon not imaged due to patient size. Given these limitations, no significant stool visualized in transverse, descending, sigmoid colon or rectum. 2. Severe aortoiliac atherosclerosis. Electronically signed by:  Yoon Malloy MD  6/29/2022 3:57 PM CDT Workstation: [id number]    Central Line    Result Date: 6/25/2022  Myesha Sharif DO     6/25/2022 10:27 PM Central Line Patient reassessed immediately prior to procedure Patient location during procedure: ICU Start time: 6/25/2022 10:00 PM Stop Time:6/25/2022 10:15 PM Staff Anesthesiologist: Myesha Sharif DO Preanesthetic Checklist Completed: patient identified, IV checked, site marked, risks and benefits discussed, surgical consent, monitors and equipment checked, pre-op evaluation and timeout performed Central Line Prep Sterile Tech:gloves, cap, gown, mask and sterile barriers Patient monitoring: blood pressure monitoring, continuous pulse oximetry and EKG Central Line Procedure Laterality:left Location:internal jugular Catheter Type:triple lumen Catheter Size:7 Fr Guidance:ultrasound guided PROCEDURE NOTE/ULTRASOUND INTERPRETATION.  Using ultrasound guidance the potential vascular  sites for insertion of the catheter were visualized to determine the patency of the vessel to be used for vascular access.  After selecting the appropriate site for insertion, the needle was visualized under ultrasound being inserted into the internal jugular vein, followed by ultrasound confirmation of wire and catheter placement. There were no abnormalities seen on ultrasound; an image was taken; and the patient tolerated the procedure with no complications. Images: still images not obtained Assessment Post procedure:biopatch applied, line sutured and occlusive dressing applied Assessement:blood return through all ports, free fluid flow and chest x-ray ordered Complications:no Patient Tolerance:patient tolerated the procedure well with no apparent complications Additional Notes U/S used throughout and needle seen throughout No complications      HOSPITAL COURSE:    Patient was admitted after after possible Flexeril overdose.  Patient was intubated for changes in mental status and to protect her airway.  She was initially found to be anemic and was given blood.  Her dialysis was continued 3 times a week and she was followed by nephrology.  High acuity was consulted for assistance with vent management.,  Patient was eventually able to follow commands and passed a spontaneous breathing trial.  Patient will be extubated.  Some inflammatory markers were up.  Patient treated for ventilator associated pneumonia.  Patient also found to have UTI.  Continue treatment to close out ventilator associated pneumonia and yeast UTI.  Patient given fluconazole and linezolid outpatient.  There is concern the patient also had some GI blood loss and gastroenterology was consulted.  She had heme positive stool.  Asked to follow-up with gastroenterology in 1 week outpatient.  Additionally patient saw psychiatry as she did have an overdose.  They signed off on her overdose being accidental.    DISCHARGE CONDITION:  "  stable    DISPOSITION:  Home-Health Care [facility]    DISCHARGE MEDICATIONS     Discharge Medications      New Medications      Instructions Start Date   fluconazole 100 MG tablet  Commonly known as: DIFLUCAN   Take 1 tablet by mouth on days that you do NOT have hemodialysis.      fluconazole 200 MG tablet  Commonly known as: DIFLUCAN   Take 1 tablet by mouth AFTER hemodialysis on days that you have hemodialysis.   Start Date: July 8, 2022     linezolid 600 MG tablet  Commonly known as: ZYVOX   Take 1 tablet by mouth Every 12 (Twelve) Hours for 3 doses.         Continue These Medications      Instructions Start Date   acetaminophen 650 MG 8 hr tablet  Commonly known as: Tylenol 8 Hour   650 mg, Oral, Every 8 Hours PRN      Adult Aspirin Regimen 81 MG EC tablet  Generic drug: aspirin   81 mg, Oral, Daily      albuterol sulfate  (90 Base) MCG/ACT inhaler  Commonly known as: PROVENTIL HFA;VENTOLIN HFA;PROAIR HFA   2 puffs, Inhalation, Every 4 Hours PRN      albuterol (2.5 MG/3ML) 0.083% nebulizer solution  Commonly known as: PROVENTIL   Inhale the contents of 1 vial by nebulization Every 4 (Four) Hours As Needed for Wheezing.      atorvastatin 20 MG tablet  Commonly known as: LIPITOR   20 mg, Oral, Every Night at Bedtime      bumetanide 2 MG tablet  Commonly known as: BUMEX   Take 1 tablet by mouth 4 (Four) Times a Week. Take on non-hemodialysis days.      Capsaicin 0.035 % cream   3 g, Apply externally, 3 Times Daily PRN      Cetirizine HCl 10 MG capsule   1 capsule, Oral, Daily      clopidogrel 75 MG tablet  Commonly known as: PLAVIX   75 mg, Oral, Daily      [employer] Blood Glucose Meter w/Device kit   1 each, Does not apply, 3 Times Daily      Diclofenac Sodium 1 % gel gel  Commonly known as: VOLTAREN   4 g, Topical, 4 Times Daily PRN      DULoxetine 20 MG capsule  Commonly known as: CYMBALTA   40 mg, Oral, Daily      Easy Touch Insulin Syringe 30G X 5/16\" 0.5 ML misc  Generic drug: Insulin Syringe-Needle U-100   USE " AS DIRECTED WITH LEVEMIR      Easy Touch Pen Needles 31G X 8 MM misc  Generic drug: Insulin Pen Needle   No dose, route, or frequency recorded.      NovoFine 30G X 8 MM misc  Generic drug: Insulin Pen Needle   As directed 4 times daily      B-D ULTRAFINE III SHORT PEN 31G X 8 MM misc  Generic drug: Insulin Pen Needle   Use to inject insulin 4 (Four) Times a Day as directed.      FreeStyle Jade 2 [place] device   1 each, Does not apply, Continuous      FreeStyle Jade 2 Sensor misc   1 each, Does not apply, Every 14 Days      gabapentin 300 MG capsule  Commonly known as: NEURONTIN   300 mg, Oral, Daily, And an extra pill M/W/F - dialysis days      glucose blood test strip   Use as instructed      insulin detemir 100 UNIT/ML injection  Commonly known as: LEVEMIR   25 Units, Subcutaneous, Nightly      Insulin Lispro (1 Unit Dial) 100 UNIT/ML solution pen-injector  Commonly known as: HUMALOG   12 Units, Subcutaneous, 3 Times Daily With Meals      ipratropium-albuterol 0.5-2.5 mg/3 ml nebulizer  Commonly known as: DUO-NEB   3 mL, Nebulization, 4 Times Daily - RT      isosorbide mononitrate 30 MG 24 hr tablet  Commonly known as: IMDUR   30 mg, Oral, Every Morning      levothyroxine 25 MCG tablet  Commonly known as: SYNTHROID, LEVOTHROID   25 mcg, Oral, Daily      lisinopril 20 MG tablet  Commonly known as: PRINIVIL,ZESTRIL   20 mg, Oral, Daily      metoprolol tartrate 25 MG tablet  Commonly known as: LOPRESSOR   25 mg, Oral, Every 12 Hours Scheduled      MIRCERA IJ   150 mcg, Every 14 Days      MIRCERA IJ   225 mcg, Every 14 Days      montelukast 10 MG tablet  Commonly known as: SINGULAIR   10 mg, Oral, Nightly      muscle rub 10-15 % cream cream   Apply 1 application topically to the appropriate area as directed 4 (Four) Times a Day As Needed for buttock pain.      nitroglycerin 0.4 MG SL tablet  Commonly known as: NITROSTAT   0.4 mg, Sublingual, Every 5 Minutes PRN      O2  Commonly known as: OXYGEN   3 L/min,  Inhalation, Continuous      ondansetron ODT 4 MG disintegrating tablet  Commonly known as: Zofran ODT   4 mg, Translingual, Every 8 Hours PRN      oxybutynin XL 5 MG 24 hr tablet  Commonly known as: DITROPAN-XL   5 mg, Oral, Daily      pantoprazole 40 MG EC tablet  Commonly known as: PROTONIX   40 mg, Oral, Nightly      promethazine 25 MG suppository  Commonly known as: PHENERGAN   Rectal, Every 6 Hours      ranolazine 500 MG 12 hr tablet  Commonly known as: RANEXA   500 mg, Oral, Every 12 Hours Scheduled      Rybelsus 7 MG tablet  Generic drug: Semaglutide   7 mg, Oral, Daily      sevelamer 800 MG tablet  Commonly known as: RENVELA   800 mg, Oral, 3 Times Daily With Meals      sodium bicarbonate 650 MG tablet   1 tablet, Oral         Stop These Medications    cyclobenzaprine 5 MG tablet  Commonly known as: FLEXERIL            INSTRUCTIONS:  Activity:   Activity Instructions     Activity as Tolerated          Diet:   Diet Instructions     Diet: Cardiac, Renal      Discharge Diet:  Cardiac  Renal       Fluid Restriction per day: 1500 mL Fluid          FOLLOW UP:   Additional Instructions for the Follow-ups that You Need to Schedule     Ambulatory Referral to [facility]   As directed      Face to Face Visit Date: 7/6/2022    Follow-up provider for Plan of Care?: I treated the patient in an acute care facility and will not continue treatment after discharge.    Follow-up provider: MAITE QUINN [[id number]]    Reason/Clinical Findings: functional mobility decreased, on ventilator during admission    Describe mobility limitations that make leaving home difficult: Difficulty walking normal distances    Nursing/Therapeutic Services Requested: Physical Therapy    PT orders: Gait Training Transfer training Home safety assessment Strengthening    Weight Bearing Status: As Tolerated    Frequency: 1 Week 1         Ambulatory Referral to [facility] ([facility])   As directed      Face to Face Visit Date: 7/7/2022     Follow-up provider for Plan of Care?: I treated the patient in an acute care facility and will not continue treatment after discharge.    Follow-up provider: RIANNA QUINN [[id number]]    Reason/Clinical Findings: debility after intubation    Describe mobility limitations that make leaving home difficult: limited ability to walk without walker, even limited distance with walker    Nursing/Therapeutic Services Requested: Occupational Therapy    Frequency: 1 Week 1         Discharge Follow-up with PCP   As directed       Currently Documented PCP:    Rianna Quinn MD    PCP Phone Number:    [phone]     Follow Up Details: with your PCP in 3-5 days            Contact information for follow-up providers     Rianna Quinn MD Follow up on 7/11/2022.    Specialty: Family Medicine  Why: with your PCP in 3-5 days;Monday July 11th @ 3:15 pm  Contact information:  [address] [place] [address] [phone]                   Contact information for after-discharge care     Home Medical Care     [facility] .    Services: Home Health Services, Home Rehabilitation, Home Nursing  Contact information:  [address] [place] [address] [phone]                             PENDING TEST RESULTS AT DISCHARGE      Time: >30 minutes were spent in discharge planning, medication reconciliation and coordination of care for this patient.    Dr. Argueta is the attending at time of discharge, She is aware of the patient's status and agrees with the above discharge summary.       Jerman Coffman MD   PGY-3    [facility]  [address] [place] [address]  Office: [phone]    This document has been electronically signed by Jerman Coffman MD on July 8, 2022 06:13 CDT              Electronically signed by Radha Argueta MD at 07/08/22 1624

## 2022-07-12 ENCOUNTER — HOME CARE VISIT (OUTPATIENT)
Dept: HOME HEALTH SERVICES | Facility: CLINIC | Age: 62
End: 2022-07-12

## 2022-07-12 VITALS
OXYGEN SATURATION: 99 % | DIASTOLIC BLOOD PRESSURE: 82 MMHG | HEART RATE: 72 BPM | TEMPERATURE: 97.3 F | RESPIRATION RATE: 16 BRPM | SYSTOLIC BLOOD PRESSURE: 140 MMHG

## 2022-07-12 VITALS
DIASTOLIC BLOOD PRESSURE: 80 MMHG | HEART RATE: 97 BPM | TEMPERATURE: 97.4 F | OXYGEN SATURATION: 99 % | RESPIRATION RATE: 18 BRPM | SYSTOLIC BLOOD PRESSURE: 150 MMHG

## 2022-07-12 PROCEDURE — G0158 HHC OT ASSISTANT EA 15: HCPCS

## 2022-07-12 PROCEDURE — G0299 HHS/HOSPICE OF RN EA 15 MIN: HCPCS

## 2022-07-14 ENCOUNTER — HOME CARE VISIT (OUTPATIENT)
Dept: HOME HEALTH SERVICES | Facility: CLINIC | Age: 62
End: 2022-07-14

## 2022-07-14 VITALS
DIASTOLIC BLOOD PRESSURE: 84 MMHG | OXYGEN SATURATION: 97 % | HEART RATE: 72 BPM | TEMPERATURE: 97.9 F | RESPIRATION RATE: 20 BRPM | SYSTOLIC BLOOD PRESSURE: 138 MMHG

## 2022-07-14 PROCEDURE — G0300 HHS/HOSPICE OF LPN EA 15 MIN: HCPCS

## 2022-07-14 PROCEDURE — G0157 HHC PT ASSISTANT EA 15: HCPCS

## 2022-07-15 VITALS
OXYGEN SATURATION: 97 % | RESPIRATION RATE: 18 BRPM | HEART RATE: 72 BPM | TEMPERATURE: 97.9 F | SYSTOLIC BLOOD PRESSURE: 150 MMHG | DIASTOLIC BLOOD PRESSURE: 95 MMHG

## 2022-07-15 NOTE — HOME HEALTH
Patient states she is feeling better today but had to cxl dialysis yesterday due to not feeling well. I would like to walk better.

## 2022-07-19 ENCOUNTER — HOME CARE VISIT (OUTPATIENT)
Dept: HOME HEALTH SERVICES | Facility: CLINIC | Age: 62
End: 2022-07-19

## 2022-07-19 VITALS
DIASTOLIC BLOOD PRESSURE: 90 MMHG | OXYGEN SATURATION: 99 % | TEMPERATURE: 97.2 F | SYSTOLIC BLOOD PRESSURE: 172 MMHG | RESPIRATION RATE: 20 BRPM | HEART RATE: 82 BPM

## 2022-07-19 PROCEDURE — G0158 HHC OT ASSISTANT EA 15: HCPCS

## 2022-07-19 NOTE — CASE COMMUNICATION
Patient aware and agreeable to D/C next session.  Patient is on track to meet all goals.    Huy PASTOR  7/19/22

## 2022-07-21 ENCOUNTER — HOME CARE VISIT (OUTPATIENT)
Dept: HOME HEALTH SERVICES | Facility: CLINIC | Age: 62
End: 2022-07-21

## 2022-07-21 PROCEDURE — G0300 HHS/HOSPICE OF LPN EA 15 MIN: HCPCS

## 2022-07-21 PROCEDURE — G0157 HHC PT ASSISTANT EA 15: HCPCS

## 2022-07-22 VITALS
DIASTOLIC BLOOD PRESSURE: 80 MMHG | HEART RATE: 64 BPM | SYSTOLIC BLOOD PRESSURE: 150 MMHG | RESPIRATION RATE: 18 BRPM | TEMPERATURE: 97.2 F | OXYGEN SATURATION: 99 %

## 2022-07-22 VITALS
TEMPERATURE: 97.8 F | SYSTOLIC BLOOD PRESSURE: 150 MMHG | DIASTOLIC BLOOD PRESSURE: 80 MMHG | OXYGEN SATURATION: 98 % | RESPIRATION RATE: 18 BRPM | HEART RATE: 72 BPM

## 2022-07-22 NOTE — CASE COMMUNICATION
Patient is progressing well toward all goals achieved.  Patient is pleased with progress and feels safe and able to manage independently without concern.  Patient would benefit from 1x visit to finalize and ensure independence with HEP and MMT.

## 2022-07-23 LAB
QT INTERVAL: 408 MS
QT INTERVAL: 422 MS
QT INTERVAL: 436 MS
QT INTERVAL: 444 MS
QT INTERVAL: 450 MS
QTC INTERVAL: 524 MS
QTC INTERVAL: 538 MS
QTC INTERVAL: 538 MS
QTC INTERVAL: 544 MS
QTC INTERVAL: 547 MS

## 2022-07-26 ENCOUNTER — HOME CARE VISIT (OUTPATIENT)
Dept: HOME HEALTH SERVICES | Facility: CLINIC | Age: 62
End: 2022-07-26

## 2022-07-26 NOTE — CASE COMMUNICATION
Patient with missed visit today because of previously unknown appointments.  She does not want to be seen on Thursday d/t PT and SN visits.  I was not sure if a visit could be added for next Tuesday 8/2/22 (this will be a DC visit).  Her cert is 7/8 and she has nursing visit through the end of next month.    Huy PASTOR  7/26/22

## 2022-07-26 NOTE — CASE COMMUNICATION
Patient missed a OT visit from [facility] on 7/26/22    Reason: not at home d/t appointments    Huy PASTOR  7/26/22      For your records only.   As per home health protocol, MD must be notified of missed/cancelled visits; therefore the prescribed frequency was not met.

## 2022-07-28 ENCOUNTER — HOME CARE VISIT (OUTPATIENT)
Dept: HOME HEALTH SERVICES | Facility: CLINIC | Age: 62
End: 2022-07-28

## 2022-07-28 PROCEDURE — G0300 HHS/HOSPICE OF LPN EA 15 MIN: HCPCS

## 2022-07-28 PROCEDURE — G0157 HHC PT ASSISTANT EA 15: HCPCS

## 2022-07-29 VITALS
DIASTOLIC BLOOD PRESSURE: 80 MMHG | SYSTOLIC BLOOD PRESSURE: 122 MMHG | HEART RATE: 86 BPM | OXYGEN SATURATION: 97 % | RESPIRATION RATE: 18 BRPM | TEMPERATURE: 97.6 F

## 2022-07-29 VITALS
HEART RATE: 80 BPM | OXYGEN SATURATION: 96 % | SYSTOLIC BLOOD PRESSURE: 102 MMHG | DIASTOLIC BLOOD PRESSURE: 60 MMHG | RESPIRATION RATE: 18 BRPM | TEMPERATURE: 97.6 F

## 2022-07-29 NOTE — CASE COMMUNICATION
PT WAS REFERRED TO HOME HEALTH FOLLOWING: Patient admitted to  6/25/2022-7/7/2022 and was treated for accidental drug overdose, acute respiratory failure requiring mechanical ventilation, anemia requiring 2 units PRBC, hyperglycemia, ventilator-acquired pneumonia, UTI and sepsis.  She has Stg 3 wound to L shin which nursing will be addressing.  Patient reports she fell out of bed this morning and her spouse helped her back in to bed.   She denies any injury; reports she just rolled out of bed.    PRIOR VS CURRENT LEVEL OF FUNCTION:    GAIT: 10'ft with RW and CGA on eval. 60'ft with RW and mod-I on D/C    T/F: Sit to stands with CGA on eval. Sit to stands with mod-I on D/C     BED MOBILITY: Sit to sup to sit with min assist. Verbalized independence on D/C    DISCHARGE CONDITION: GOOD    DISCHARGE REASON: GOALS MET    DISCHARGE TO SELF-CARE WITH FAMILY ASSIST AS NEEDED .      NEXT MD VISIT: 8/16/22 Dr Bautista

## 2022-07-29 NOTE — HOME HEALTH
Pt states she is not feeling real well today.  States she has a sinus infection. I can do everything I need to do.

## 2022-08-02 ENCOUNTER — HOME CARE VISIT (OUTPATIENT)
Dept: HOME HEALTH SERVICES | Facility: CLINIC | Age: 62
End: 2022-08-02

## 2022-08-02 VITALS
SYSTOLIC BLOOD PRESSURE: 143 MMHG | DIASTOLIC BLOOD PRESSURE: 78 MMHG | OXYGEN SATURATION: 97 % | HEART RATE: 63 BPM | TEMPERATURE: 97.6 F | RESPIRATION RATE: 18 BRPM

## 2022-08-02 VITALS
RESPIRATION RATE: 20 BRPM | TEMPERATURE: 98.8 F | HEART RATE: 86 BPM | DIASTOLIC BLOOD PRESSURE: 64 MMHG | SYSTOLIC BLOOD PRESSURE: 138 MMHG | OXYGEN SATURATION: 98 %

## 2022-08-02 PROCEDURE — G0158 HHC OT ASSISTANT EA 15: HCPCS

## 2022-08-02 PROCEDURE — G0299 HHS/HOSPICE OF RN EA 15 MIN: HCPCS

## 2022-08-04 ENCOUNTER — HOME CARE VISIT (OUTPATIENT)
Dept: HOME HEALTH SERVICES | Facility: CLINIC | Age: 62
End: 2022-08-04

## 2022-08-04 PROCEDURE — G0299 HHS/HOSPICE OF RN EA 15 MIN: HCPCS

## 2022-08-08 PROCEDURE — G0180 MD CERTIFICATION HHA PATIENT: HCPCS | Performed by: FAMILY MEDICINE

## 2022-08-09 ENCOUNTER — HOME CARE VISIT (OUTPATIENT)
Dept: HOME HEALTH SERVICES | Facility: CLINIC | Age: 62
End: 2022-08-09

## 2022-08-09 ENCOUNTER — TELEPHONE (OUTPATIENT)
Dept: FAMILY MEDICINE CLINIC | Facility: CLINIC | Age: 62
End: 2022-08-09

## 2022-08-09 VITALS
RESPIRATION RATE: 20 BRPM | OXYGEN SATURATION: 97 % | SYSTOLIC BLOOD PRESSURE: 138 MMHG | HEART RATE: 70 BPM | DIASTOLIC BLOOD PRESSURE: 74 MMHG | TEMPERATURE: 97.7 F

## 2022-08-09 PROCEDURE — G0299 HHS/HOSPICE OF RN EA 15 MIN: HCPCS

## 2022-08-09 NOTE — TELEPHONE ENCOUNTER
"I am covering for Dr. Alves and Dr. Macias this week.  I received a phone call about a patient well-known to nearly every physician in our practice, Yamileth Slater.  Home health was concerned because the patient had a \"bright red\" bowel movement.  Chart review demonstrates that the patient has had multiple encounters inpatient with GI and has had endoscopy and treatment.  Unless she develops symptomatic anemia, she can continue to follow-up outpatient.    Additionally, home health mentioned that she had had a fall and some scrapes on her left foot and a bruise on her coccyx.  They additionally mentioned a pressure ulcer on the left heel.    [facility] mentioned that I could call them with any additional questions.  I do not have additional questions.    I will see if we can get this patient scheduled for sooner follow-up by reaching out to her medical assistant, as she is already scheduled for an appointment on the 31st and we will simply bump it up.  Otherwise, no changes at this time.  We will richmond is complete.      This document has been electronically signed by Alvarado Mauricio MD on August 9, 2022 12:02 CDT    "

## 2022-08-09 NOTE — TELEPHONE ENCOUNTER
[facility] HEALTH WANTING DR. QUINN TO KNOW THAT WHEN PT HAD A BOWL MOVEMENT IT WAS BRIGHT RED AND SHE ALSO HAS NEW WOUNDS. THE WOUNDS THEY ARE SEEING HER FOR THEY WOULD NOT LET THEM PUT DRESSING ON. PT WANTED IT OPEN TO AIR OUT. SHE ALSO HAS PRESSURE ULCER ON LEFT HEAL. NURSE TOOK PICTURES OF ALL THIS. PT HAS ALSO FALLEN AND HAS SCRAPES ON LEFT FOOT TOES AND R FOOT GREAT TOE. SHE HAS BRUISE ON COCCYX.     ANY QUESTIONS [facility] WITH HOME HEALTH CAN BE REACHED [phone]

## 2022-08-09 NOTE — TELEPHONE ENCOUNTER
ATTEMPTED TO CONTACT PT TO MOVE UP APT TO SOONER TIME PER DR. CABRALES. NO ANSWER. NO VOICEMAIL SET UP.

## 2022-08-10 NOTE — CASE COMMUNICATION
"8/9/22 9450 message left with Elaina at Dr. Macias office. Patient refused polymem dressing. stated she wants left lower leg \"dressing left off because the wound gets mushy\" Skilled nurse checked patient's feet. Patient states when she had a bowel movement 8/8/22 and wiped there was bright red blood on toilet paper.  Patient has what appears to be a pressure wound to left heel. Photo obtained and wound measured. Patient states she fe ll at home 2 days ago and has an abrasion to second toe of left foot, an abrasion on great toe of right foot, and a bruise on her coccyx. Patient instructed to see podiatrist to have toe nails cut. "

## 2022-08-13 VITALS
RESPIRATION RATE: 20 BRPM | TEMPERATURE: 97.5 F | DIASTOLIC BLOOD PRESSURE: 80 MMHG | OXYGEN SATURATION: 93 % | SYSTOLIC BLOOD PRESSURE: 136 MMHG | HEART RATE: 70 BPM

## 2022-08-18 ENCOUNTER — HOME CARE VISIT (OUTPATIENT)
Dept: HOME HEALTH SERVICES | Facility: CLINIC | Age: 62
End: 2022-08-18

## 2022-08-18 VITALS
SYSTOLIC BLOOD PRESSURE: 98 MMHG | DIASTOLIC BLOOD PRESSURE: 64 MMHG | HEART RATE: 80 BPM | TEMPERATURE: 97.5 F | RESPIRATION RATE: 18 BRPM | OXYGEN SATURATION: 97 %

## 2022-08-18 PROBLEM — Z99.2: Status: ACTIVE | Noted: 2022-08-09

## 2022-08-18 PROBLEM — N18.6: Status: ACTIVE | Noted: 2022-08-09

## 2022-08-18 PROCEDURE — G0300 HHS/HOSPICE OF LPN EA 15 MIN: HCPCS

## 2022-08-19 DIAGNOSIS — Z79.4 TYPE 2 DIABETES MELLITUS WITH DIABETIC AUTONOMIC NEUROPATHY, WITH LONG-TERM CURRENT USE OF INSULIN: ICD-10-CM

## 2022-08-19 DIAGNOSIS — E11.43 TYPE 2 DIABETES MELLITUS WITH DIABETIC AUTONOMIC NEUROPATHY, WITH LONG-TERM CURRENT USE OF INSULIN: ICD-10-CM

## 2022-08-19 RX ORDER — GABAPENTIN 300 MG/1
300 CAPSULE ORAL DAILY
Qty: 45 CAPSULE | Refills: 0 | Status: CANCELLED | OUTPATIENT
Start: 2022-08-19 | End: 2022-09-18

## 2022-08-19 NOTE — ED NOTES
Spoke with pharmacy regarding Mrs. Slater medications. She states that patient has not picked up her other medications and only requested gabapentin to be refilled.I am sending prescription for a month. She needs ot make an appointment for further refills.  [facility] # [phone] review and appropriate.     Signature  Rianna Macias. MD  [facility]  [address] [place] [address]  Office: [phone]        This document has been electronically signed by Rianna Macias MD on August 19, 2022 11:54 CDT      Rianna Macias MD  Resident  08/19/22 5712

## 2022-08-22 RX ORDER — GABAPENTIN 300 MG/1
CAPSULE ORAL
Qty: 45 CAPSULE | Refills: 2 | Status: SHIPPED | OUTPATIENT
Start: 2022-08-22 | End: 2022-12-14

## 2022-08-23 ENCOUNTER — HOME CARE VISIT (OUTPATIENT)
Dept: HOME HEALTH SERVICES | Facility: CLINIC | Age: 62
End: 2022-08-23

## 2022-08-23 PROCEDURE — G0299 HHS/HOSPICE OF RN EA 15 MIN: HCPCS

## 2022-08-25 VITALS
TEMPERATURE: 97.6 F | OXYGEN SATURATION: 98 % | DIASTOLIC BLOOD PRESSURE: 76 MMHG | RESPIRATION RATE: 20 BRPM | SYSTOLIC BLOOD PRESSURE: 134 MMHG | HEART RATE: 72 BPM

## 2022-08-30 ENCOUNTER — HOME CARE VISIT (OUTPATIENT)
Dept: HOME HEALTH SERVICES | Facility: CLINIC | Age: 62
End: 2022-08-30

## 2022-08-30 VITALS
TEMPERATURE: 97.8 F | RESPIRATION RATE: 20 BRPM | SYSTOLIC BLOOD PRESSURE: 102 MMHG | DIASTOLIC BLOOD PRESSURE: 58 MMHG | OXYGEN SATURATION: 98 % | HEART RATE: 78 BPM

## 2022-08-30 PROCEDURE — G0300 HHS/HOSPICE OF LPN EA 15 MIN: HCPCS

## 2022-08-31 ENCOUNTER — TELEPHONE (OUTPATIENT)
Dept: FAMILY MEDICINE CLINIC | Facility: CLINIC | Age: 62
End: 2022-08-31

## 2022-08-31 NOTE — TELEPHONE ENCOUNTER
Mackenzie Amaya with [facility] called and is needing a order for wound care. She said the patient has wounds on her toes.    Her#[phone]

## 2022-09-02 ENCOUNTER — HOME CARE VISIT (OUTPATIENT)
Dept: HOME HEALTH SERVICES | Facility: CLINIC | Age: 62
End: 2022-09-02

## 2022-09-02 DIAGNOSIS — S81.802D OPEN WOUND OF BOTH LOWER EXTREMITIES, SUBSEQUENT ENCOUNTER: Primary | ICD-10-CM

## 2022-09-02 DIAGNOSIS — S81.801D OPEN WOUND OF BOTH LOWER EXTREMITIES, SUBSEQUENT ENCOUNTER: Primary | ICD-10-CM

## 2022-09-02 PROCEDURE — G0299 HHS/HOSPICE OF RN EA 15 MIN: HCPCS

## 2022-09-02 RX ORDER — COLLAGENASE SANTYL 250 [ARB'U]/G
1 OINTMENT TOPICAL DAILY
Qty: 90 G | Refills: 2 | Status: SHIPPED | OUTPATIENT
Start: 2022-09-02 | End: 2022-12-14

## 2022-09-06 ENCOUNTER — APPOINTMENT (OUTPATIENT)
Dept: GENERAL RADIOLOGY | Facility: HOSPITAL | Age: 62
End: 2022-09-06

## 2022-09-06 ENCOUNTER — HOSPITAL ENCOUNTER (INPATIENT)
Facility: HOSPITAL | Age: 62
LOS: 3 days | Discharge: ANOTHER HOSPITAL | End: 2022-09-10
Attending: EMERGENCY MEDICINE | Admitting: FAMILY MEDICINE

## 2022-09-06 VITALS
HEART RATE: 76 BPM | TEMPERATURE: 97.8 F | SYSTOLIC BLOOD PRESSURE: 132 MMHG | WEIGHT: 265 LBS | RESPIRATION RATE: 18 BRPM | DIASTOLIC BLOOD PRESSURE: 74 MMHG | HEIGHT: 62 IN | BODY MASS INDEX: 48.76 KG/M2 | OXYGEN SATURATION: 98 %

## 2022-09-06 DIAGNOSIS — R73.9 HYPERGLYCEMIA: ICD-10-CM

## 2022-09-06 DIAGNOSIS — E11.621 DIABETIC FOOT ULCER ASSOCIATED WITH TYPE 2 DIABETES MELLITUS, UNSPECIFIED LATERALITY, UNSPECIFIED PART OF FOOT, UNSPECIFIED ULCER STAGE: ICD-10-CM

## 2022-09-06 DIAGNOSIS — I96 GANGRENE OF BOTH FEET: ICD-10-CM

## 2022-09-06 DIAGNOSIS — Z78.9 IMPAIRED MOBILITY AND ADLS: ICD-10-CM

## 2022-09-06 DIAGNOSIS — Z74.09 IMPAIRED PHYSICAL MOBILITY: ICD-10-CM

## 2022-09-06 DIAGNOSIS — L97.509 DIABETIC FOOT ULCER ASSOCIATED WITH TYPE 2 DIABETES MELLITUS, UNSPECIFIED LATERALITY, UNSPECIFIED PART OF FOOT, UNSPECIFIED ULCER STAGE: ICD-10-CM

## 2022-09-06 DIAGNOSIS — A41.9 SEPSIS WITHOUT ACUTE ORGAN DYSFUNCTION, DUE TO UNSPECIFIED ORGANISM: Primary | ICD-10-CM

## 2022-09-06 DIAGNOSIS — Z74.09 IMPAIRED MOBILITY AND ADLS: ICD-10-CM

## 2022-09-06 LAB
ALBUMIN SERPL-MCNC: 3.1 G/DL (ref 3.5–5.2)
ALBUMIN/GLOB SERPL: 0.6 G/DL
ALP SERPL-CCNC: 194 U/L (ref 39–117)
ALT SERPL W P-5'-P-CCNC: 5 U/L (ref 1–33)
ANION GAP SERPL CALCULATED.3IONS-SCNC: 12 MMOL/L (ref 5–15)
AST SERPL-CCNC: 9 U/L (ref 1–32)
BASOPHILS # BLD AUTO: 0.09 10*3/MM3 (ref 0–0.2)
BASOPHILS NFR BLD AUTO: 0.6 % (ref 0–1.5)
BILIRUB SERPL-MCNC: 0.4 MG/DL (ref 0–1.2)
BUN SERPL-MCNC: 43 MG/DL (ref 8–23)
BUN/CREAT SERPL: 13.5 (ref 7–25)
CALCIUM SPEC-SCNC: 9.1 MG/DL (ref 8.6–10.5)
CHLORIDE SERPL-SCNC: 89 MMOL/L (ref 98–107)
CO2 SERPL-SCNC: 28 MMOL/L (ref 22–29)
CREAT SERPL-MCNC: 3.18 MG/DL (ref 0.57–1)
CRP SERPL-MCNC: 32.67 MG/DL (ref 0–0.5)
D-LACTATE SERPL-SCNC: 1.6 MMOL/L (ref 0.5–2)
DEPRECATED RDW RBC AUTO: 51.9 FL (ref 37–54)
EGFRCR SERPLBLD CKD-EPI 2021: 15.8 ML/MIN/1.73
EOSINOPHIL # BLD AUTO: 0.05 10*3/MM3 (ref 0–0.4)
EOSINOPHIL NFR BLD AUTO: 0.4 % (ref 0.3–6.2)
ERYTHROCYTE [DISTWIDTH] IN BLOOD BY AUTOMATED COUNT: 16.4 % (ref 12.3–15.4)
ERYTHROCYTE [SEDIMENTATION RATE] IN BLOOD: 114 MM/HR (ref 0–30)
GLOBULIN UR ELPH-MCNC: 5 GM/DL
GLUCOSE SERPL-MCNC: 549 MG/DL (ref 65–99)
HCT VFR BLD AUTO: 34 % (ref 34–46.6)
HGB BLD-MCNC: 10.4 G/DL (ref 12–15.9)
HOLD SPECIMEN: NORMAL
IMM GRANULOCYTES # BLD AUTO: 0.14 10*3/MM3 (ref 0–0.05)
IMM GRANULOCYTES NFR BLD AUTO: 1 % (ref 0–0.5)
LYMPHOCYTES # BLD AUTO: 1.02 10*3/MM3 (ref 0.7–3.1)
LYMPHOCYTES NFR BLD AUTO: 7.2 % (ref 19.6–45.3)
MCH RBC QN AUTO: 26.6 PG (ref 26.6–33)
MCHC RBC AUTO-ENTMCNC: 30.6 G/DL (ref 31.5–35.7)
MCV RBC AUTO: 87 FL (ref 79–97)
MONOCYTES # BLD AUTO: 0.9 10*3/MM3 (ref 0.1–0.9)
MONOCYTES NFR BLD AUTO: 6.3 % (ref 5–12)
NEUTROPHILS NFR BLD AUTO: 12.01 10*3/MM3 (ref 1.7–7)
NEUTROPHILS NFR BLD AUTO: 84.5 % (ref 42.7–76)
NRBC BLD AUTO-RTO: 0 /100 WBC (ref 0–0.2)
PLATELET # BLD AUTO: 224 10*3/MM3 (ref 140–450)
PMV BLD AUTO: 8.8 FL (ref 6–12)
POTASSIUM SERPL-SCNC: 3.7 MMOL/L (ref 3.5–5.2)
PROT SERPL-MCNC: 8.1 G/DL (ref 6–8.5)
RBC # BLD AUTO: 3.91 10*6/MM3 (ref 3.77–5.28)
SODIUM SERPL-SCNC: 129 MMOL/L (ref 136–145)
WBC NRBC COR # BLD: 14.21 10*3/MM3 (ref 3.4–10.8)
WHOLE BLOOD HOLD COAG: NORMAL
WHOLE BLOOD HOLD SPECIMEN: NORMAL

## 2022-09-06 PROCEDURE — 73630 X-RAY EXAM OF FOOT: CPT

## 2022-09-06 PROCEDURE — 85652 RBC SED RATE AUTOMATED: CPT | Performed by: EMERGENCY MEDICINE

## 2022-09-06 PROCEDURE — 80053 COMPREHEN METABOLIC PANEL: CPT | Performed by: EMERGENCY MEDICINE

## 2022-09-06 PROCEDURE — 63710000001 INSULIN REGULAR HUMAN PER 5 UNITS: Performed by: EMERGENCY MEDICINE

## 2022-09-06 PROCEDURE — G0378 HOSPITAL OBSERVATION PER HR: HCPCS

## 2022-09-06 PROCEDURE — 25010000002 VANCOMYCIN 10 G RECONSTITUTED SOLUTION: Performed by: EMERGENCY MEDICINE

## 2022-09-06 PROCEDURE — 99284 EMERGENCY DEPT VISIT MOD MDM: CPT

## 2022-09-06 PROCEDURE — 87040 BLOOD CULTURE FOR BACTERIA: CPT | Performed by: EMERGENCY MEDICINE

## 2022-09-06 PROCEDURE — 85025 COMPLETE CBC W/AUTO DIFF WBC: CPT | Performed by: EMERGENCY MEDICINE

## 2022-09-06 PROCEDURE — 87340 HEPATITIS B SURFACE AG IA: CPT | Performed by: INTERNAL MEDICINE

## 2022-09-06 PROCEDURE — 36415 COLL VENOUS BLD VENIPUNCTURE: CPT | Performed by: EMERGENCY MEDICINE

## 2022-09-06 PROCEDURE — 83605 ASSAY OF LACTIC ACID: CPT | Performed by: EMERGENCY MEDICINE

## 2022-09-06 PROCEDURE — 86140 C-REACTIVE PROTEIN: CPT | Performed by: EMERGENCY MEDICINE

## 2022-09-06 PROCEDURE — 82962 GLUCOSE BLOOD TEST: CPT

## 2022-09-06 RX ORDER — POLYETHYLENE GLYCOL 3350 17 G/17G
17 POWDER, FOR SOLUTION ORAL DAILY PRN
Status: DISCONTINUED | OUTPATIENT
Start: 2022-09-06 | End: 2022-09-10 | Stop reason: HOSPADM

## 2022-09-06 RX ORDER — ENOXAPARIN SODIUM 100 MG/ML
30 INJECTION SUBCUTANEOUS NIGHTLY
Status: DISCONTINUED | OUTPATIENT
Start: 2022-09-06 | End: 2022-09-08

## 2022-09-06 RX ORDER — NITROGLYCERIN 0.4 MG/1
0.4 TABLET SUBLINGUAL
Status: DISCONTINUED | OUTPATIENT
Start: 2022-09-06 | End: 2022-09-10 | Stop reason: HOSPADM

## 2022-09-06 RX ORDER — BUMETANIDE 1 MG/1
2 TABLET ORAL
Status: DISCONTINUED | OUTPATIENT
Start: 2022-09-08 | End: 2022-09-10 | Stop reason: HOSPADM

## 2022-09-06 RX ORDER — SEVELAMER CARBONATE 800 MG/1
800 TABLET, FILM COATED ORAL
Status: DISCONTINUED | OUTPATIENT
Start: 2022-09-07 | End: 2022-09-10 | Stop reason: HOSPADM

## 2022-09-06 RX ORDER — INSULIN ASPART 100 [IU]/ML
30 INJECTION, SOLUTION INTRAVENOUS; SUBCUTANEOUS
Status: DISCONTINUED | OUTPATIENT
Start: 2022-09-07 | End: 2022-09-10 | Stop reason: HOSPADM

## 2022-09-06 RX ORDER — BISACODYL 10 MG
10 SUPPOSITORY, RECTAL RECTAL DAILY PRN
Status: DISCONTINUED | OUTPATIENT
Start: 2022-09-06 | End: 2022-09-10 | Stop reason: HOSPADM

## 2022-09-06 RX ORDER — ISOSORBIDE MONONITRATE 30 MG/1
30 TABLET, EXTENDED RELEASE ORAL EVERY MORNING
Status: DISCONTINUED | OUTPATIENT
Start: 2022-09-07 | End: 2022-09-10 | Stop reason: HOSPADM

## 2022-09-06 RX ORDER — GABAPENTIN 300 MG/1
300 CAPSULE ORAL DAILY
Status: DISCONTINUED | OUTPATIENT
Start: 2022-09-07 | End: 2022-09-10 | Stop reason: HOSPADM

## 2022-09-06 RX ORDER — LISINOPRIL 20 MG/1
20 TABLET ORAL DAILY
Status: DISCONTINUED | OUTPATIENT
Start: 2022-09-07 | End: 2022-09-10 | Stop reason: HOSPADM

## 2022-09-06 RX ORDER — NALOXONE HCL 0.4 MG/ML
0.4 VIAL (ML) INJECTION
Status: DISCONTINUED | OUTPATIENT
Start: 2022-09-06 | End: 2022-09-10 | Stop reason: HOSPADM

## 2022-09-06 RX ORDER — PANTOPRAZOLE SODIUM 40 MG/1
40 TABLET, DELAYED RELEASE ORAL NIGHTLY
Status: DISCONTINUED | OUTPATIENT
Start: 2022-09-07 | End: 2022-09-10 | Stop reason: HOSPADM

## 2022-09-06 RX ORDER — MONTELUKAST SODIUM 10 MG/1
10 TABLET ORAL NIGHTLY
Status: DISCONTINUED | OUTPATIENT
Start: 2022-09-07 | End: 2022-09-10 | Stop reason: HOSPADM

## 2022-09-06 RX ORDER — ASPIRIN 81 MG/1
81 TABLET ORAL DAILY
Status: DISCONTINUED | OUTPATIENT
Start: 2022-09-07 | End: 2022-09-08

## 2022-09-06 RX ORDER — CLOPIDOGREL BISULFATE 75 MG/1
75 TABLET ORAL DAILY
Status: DISCONTINUED | OUTPATIENT
Start: 2022-09-07 | End: 2022-09-08

## 2022-09-06 RX ORDER — OXYBUTYNIN CHLORIDE 5 MG/1
5 TABLET, EXTENDED RELEASE ORAL DAILY
Status: DISCONTINUED | OUTPATIENT
Start: 2022-09-07 | End: 2022-09-10 | Stop reason: HOSPADM

## 2022-09-06 RX ORDER — LEVOTHYROXINE SODIUM 0.03 MG/1
25 TABLET ORAL DAILY
Status: DISCONTINUED | OUTPATIENT
Start: 2022-09-07 | End: 2022-09-10 | Stop reason: HOSPADM

## 2022-09-06 RX ORDER — NICOTINE POLACRILEX 4 MG
15 LOZENGE BUCCAL
Status: DISCONTINUED | OUTPATIENT
Start: 2022-09-06 | End: 2022-09-10 | Stop reason: HOSPADM

## 2022-09-06 RX ORDER — MORPHINE SULFATE 2 MG/ML
1 INJECTION, SOLUTION INTRAMUSCULAR; INTRAVENOUS EVERY 4 HOURS PRN
Status: DISCONTINUED | OUTPATIENT
Start: 2022-09-06 | End: 2022-09-10 | Stop reason: HOSPADM

## 2022-09-06 RX ORDER — SODIUM CHLORIDE 0.9 % (FLUSH) 0.9 %
10 SYRINGE (ML) INJECTION EVERY 12 HOURS SCHEDULED
Status: DISCONTINUED | OUTPATIENT
Start: 2022-09-07 | End: 2022-09-10 | Stop reason: HOSPADM

## 2022-09-06 RX ORDER — SODIUM CHLORIDE 0.9 % (FLUSH) 0.9 %
10 SYRINGE (ML) INJECTION AS NEEDED
Status: DISCONTINUED | OUTPATIENT
Start: 2022-09-06 | End: 2022-09-10 | Stop reason: HOSPADM

## 2022-09-06 RX ORDER — RANOLAZINE 500 MG/1
500 TABLET, EXTENDED RELEASE ORAL EVERY 12 HOURS SCHEDULED
Status: DISCONTINUED | OUTPATIENT
Start: 2022-09-07 | End: 2022-09-10 | Stop reason: HOSPADM

## 2022-09-06 RX ORDER — LANOLIN ALCOHOL/MO/W.PET/CERES
5 CREAM (GRAM) TOPICAL NIGHTLY PRN
Status: DISCONTINUED | OUTPATIENT
Start: 2022-09-06 | End: 2022-09-10 | Stop reason: HOSPADM

## 2022-09-06 RX ORDER — ONDANSETRON 4 MG/1
4 TABLET, FILM COATED ORAL EVERY 6 HOURS PRN
Status: DISCONTINUED | OUTPATIENT
Start: 2022-09-06 | End: 2022-09-10 | Stop reason: HOSPADM

## 2022-09-06 RX ORDER — AMOXICILLIN 250 MG
2 CAPSULE ORAL 2 TIMES DAILY
Status: DISCONTINUED | OUTPATIENT
Start: 2022-09-07 | End: 2022-09-10 | Stop reason: HOSPADM

## 2022-09-06 RX ORDER — ATORVASTATIN CALCIUM 20 MG/1
20 TABLET, FILM COATED ORAL DAILY
Status: DISCONTINUED | OUTPATIENT
Start: 2022-09-07 | End: 2022-09-10 | Stop reason: HOSPADM

## 2022-09-06 RX ORDER — ALBUTEROL SULFATE 2.5 MG/3ML
2.5 SOLUTION RESPIRATORY (INHALATION) EVERY 4 HOURS PRN
Status: DISCONTINUED | OUTPATIENT
Start: 2022-09-06 | End: 2022-09-10 | Stop reason: HOSPADM

## 2022-09-06 RX ORDER — DEXTROSE MONOHYDRATE 25 G/50ML
25 INJECTION, SOLUTION INTRAVENOUS
Status: DISCONTINUED | OUTPATIENT
Start: 2022-09-06 | End: 2022-09-10 | Stop reason: HOSPADM

## 2022-09-06 RX ORDER — ONDANSETRON 2 MG/ML
4 INJECTION INTRAMUSCULAR; INTRAVENOUS EVERY 6 HOURS PRN
Status: DISCONTINUED | OUTPATIENT
Start: 2022-09-06 | End: 2022-09-10 | Stop reason: HOSPADM

## 2022-09-06 RX ORDER — IPRATROPIUM BROMIDE AND ALBUTEROL SULFATE 2.5; .5 MG/3ML; MG/3ML
3 SOLUTION RESPIRATORY (INHALATION)
Status: DISCONTINUED | OUTPATIENT
Start: 2022-09-07 | End: 2022-09-10 | Stop reason: HOSPADM

## 2022-09-06 RX ORDER — BISACODYL 5 MG/1
5 TABLET, DELAYED RELEASE ORAL DAILY PRN
Status: DISCONTINUED | OUTPATIENT
Start: 2022-09-06 | End: 2022-09-10 | Stop reason: HOSPADM

## 2022-09-06 RX ORDER — ACETAMINOPHEN 325 MG/1
650 TABLET ORAL EVERY 6 HOURS PRN
Status: DISCONTINUED | OUTPATIENT
Start: 2022-09-06 | End: 2022-09-10 | Stop reason: HOSPADM

## 2022-09-06 RX ORDER — ASPIRIN 81 MG
3 TABLET,CHEWABLE ORAL 3 TIMES DAILY PRN
Status: DISCONTINUED | OUTPATIENT
Start: 2022-09-06 | End: 2022-09-10 | Stop reason: HOSPADM

## 2022-09-06 RX ORDER — INSULIN ASPART 100 [IU]/ML
0-24 INJECTION, SOLUTION INTRAVENOUS; SUBCUTANEOUS
Status: DISCONTINUED | OUTPATIENT
Start: 2022-09-07 | End: 2022-09-10 | Stop reason: HOSPADM

## 2022-09-06 RX ORDER — CETIRIZINE HYDROCHLORIDE 5 MG/1
5 TABLET ORAL DAILY
Status: DISCONTINUED | OUTPATIENT
Start: 2022-09-07 | End: 2022-09-10 | Stop reason: HOSPADM

## 2022-09-06 RX ADMIN — VANCOMYCIN HYDROCHLORIDE 1750 MG: 10 INJECTION, POWDER, LYOPHILIZED, FOR SOLUTION INTRAVENOUS at 22:52

## 2022-09-06 RX ADMIN — HUMAN INSULIN 8 UNITS: 100 INJECTION, SOLUTION SUBCUTANEOUS at 22:50

## 2022-09-07 ENCOUNTER — APPOINTMENT (OUTPATIENT)
Dept: MRI IMAGING | Facility: HOSPITAL | Age: 62
End: 2022-09-07

## 2022-09-07 ENCOUNTER — HOME CARE VISIT (OUTPATIENT)
Dept: HOME HEALTH SERVICES | Facility: HOME HEALTHCARE | Age: 62
End: 2022-09-07

## 2022-09-07 ENCOUNTER — APPOINTMENT (OUTPATIENT)
Dept: ULTRASOUND IMAGING | Facility: HOSPITAL | Age: 62
End: 2022-09-07

## 2022-09-07 LAB
ALBUMIN SERPL-MCNC: 2.6 G/DL (ref 3.5–5.2)
ALBUMIN/GLOB SERPL: 0.5 G/DL
ALP SERPL-CCNC: 172 U/L (ref 39–117)
ALT SERPL W P-5'-P-CCNC: <5 U/L (ref 1–33)
ANION GAP SERPL CALCULATED.3IONS-SCNC: 10 MMOL/L (ref 5–15)
AST SERPL-CCNC: 7 U/L (ref 1–32)
BASOPHILS # BLD AUTO: 0.08 10*3/MM3 (ref 0–0.2)
BASOPHILS NFR BLD AUTO: 0.6 % (ref 0–1.5)
BILIRUB SERPL-MCNC: 0.4 MG/DL (ref 0–1.2)
BUN SERPL-MCNC: 42 MG/DL (ref 8–23)
BUN/CREAT SERPL: 13.2 (ref 7–25)
CALCIUM SPEC-SCNC: 8.8 MG/DL (ref 8.6–10.5)
CHLORIDE SERPL-SCNC: 92 MMOL/L (ref 98–107)
CHOLEST SERPL-MCNC: 124 MG/DL (ref 0–200)
CO2 SERPL-SCNC: 28 MMOL/L (ref 22–29)
CREAT SERPL-MCNC: 3.19 MG/DL (ref 0.57–1)
CRP SERPL-MCNC: 34.1 MG/DL (ref 0–0.5)
DEPRECATED RDW RBC AUTO: 51.9 FL (ref 37–54)
EGFRCR SERPLBLD CKD-EPI 2021: 15.8 ML/MIN/1.73
EOSINOPHIL # BLD AUTO: 0.13 10*3/MM3 (ref 0–0.4)
EOSINOPHIL NFR BLD AUTO: 1 % (ref 0.3–6.2)
ERYTHROCYTE [DISTWIDTH] IN BLOOD BY AUTOMATED COUNT: 16.2 % (ref 12.3–15.4)
GLOBULIN UR ELPH-MCNC: 5 GM/DL
GLUCOSE BLDC GLUCOMTR-MCNC: 118 MG/DL (ref 70–130)
GLUCOSE BLDC GLUCOMTR-MCNC: 317 MG/DL (ref 70–130)
GLUCOSE BLDC GLUCOMTR-MCNC: 457 MG/DL (ref 70–130)
GLUCOSE BLDC GLUCOMTR-MCNC: 470 MG/DL (ref 70–130)
GLUCOSE BLDC GLUCOMTR-MCNC: 485 MG/DL (ref 70–130)
GLUCOSE BLDC GLUCOMTR-MCNC: 53 MG/DL (ref 70–130)
GLUCOSE BLDC GLUCOMTR-MCNC: 56 MG/DL (ref 70–130)
GLUCOSE BLDC GLUCOMTR-MCNC: 65 MG/DL (ref 70–130)
GLUCOSE BLDC GLUCOMTR-MCNC: 72 MG/DL (ref 70–130)
GLUCOSE BLDC GLUCOMTR-MCNC: 82 MG/DL (ref 70–130)
GLUCOSE BLDC GLUCOMTR-MCNC: 93 MG/DL (ref 70–130)
GLUCOSE SERPL-MCNC: 452 MG/DL (ref 65–99)
HBA1C MFR BLD: 11 % (ref 4.8–5.6)
HBV SURFACE AG SERPL QL IA: NORMAL
HCT VFR BLD AUTO: 31.6 % (ref 34–46.6)
HDLC SERPL-MCNC: 39 MG/DL (ref 40–60)
HGB BLD-MCNC: 9.7 G/DL (ref 12–15.9)
HOLD SPECIMEN: NORMAL
IMM GRANULOCYTES # BLD AUTO: 0.08 10*3/MM3 (ref 0–0.05)
IMM GRANULOCYTES NFR BLD AUTO: 0.6 % (ref 0–0.5)
INR PPP: 1.25 (ref 0.8–1.2)
LDLC SERPL CALC-MCNC: 56 MG/DL (ref 0–100)
LDLC/HDLC SERPL: 1.29 {RATIO}
LYMPHOCYTES # BLD AUTO: 1.55 10*3/MM3 (ref 0.7–3.1)
LYMPHOCYTES NFR BLD AUTO: 12.4 % (ref 19.6–45.3)
MCH RBC QN AUTO: 27 PG (ref 26.6–33)
MCHC RBC AUTO-ENTMCNC: 30.7 G/DL (ref 31.5–35.7)
MCV RBC AUTO: 88 FL (ref 79–97)
MONOCYTES # BLD AUTO: 0.89 10*3/MM3 (ref 0.1–0.9)
MONOCYTES NFR BLD AUTO: 7.1 % (ref 5–12)
NEUTROPHILS NFR BLD AUTO: 78.3 % (ref 42.7–76)
NEUTROPHILS NFR BLD AUTO: 9.74 10*3/MM3 (ref 1.7–7)
NRBC BLD AUTO-RTO: 0 /100 WBC (ref 0–0.2)
PLATELET # BLD AUTO: 230 10*3/MM3 (ref 140–450)
PMV BLD AUTO: 8.9 FL (ref 6–12)
POTASSIUM SERPL-SCNC: 3.7 MMOL/L (ref 3.5–5.2)
PROT SERPL-MCNC: 7.6 G/DL (ref 6–8.5)
PROTHROMBIN TIME: 15.6 SECONDS (ref 11.1–15.3)
RBC # BLD AUTO: 3.59 10*6/MM3 (ref 3.77–5.28)
SODIUM SERPL-SCNC: 130 MMOL/L (ref 136–145)
TRIGL SERPL-MCNC: 173 MG/DL (ref 0–150)
VANCOMYCIN SERPL-MCNC: 18 MCG/ML (ref 5–40)
VLDLC SERPL-MCNC: 29 MG/DL (ref 5–40)
WBC NRBC COR # BLD: 12.47 10*3/MM3 (ref 3.4–10.8)

## 2022-09-07 PROCEDURE — 80061 LIPID PANEL: CPT | Performed by: STUDENT IN AN ORGANIZED HEALTH CARE EDUCATION/TRAINING PROGRAM

## 2022-09-07 PROCEDURE — 25010000002 ENOXAPARIN PER 10 MG: Performed by: STUDENT IN AN ORGANIZED HEALTH CARE EDUCATION/TRAINING PROGRAM

## 2022-09-07 PROCEDURE — 63710000001 INSULIN ASPART PER 5 UNITS: Performed by: STUDENT IN AN ORGANIZED HEALTH CARE EDUCATION/TRAINING PROGRAM

## 2022-09-07 PROCEDURE — 94664 DEMO&/EVAL PT USE INHALER: CPT

## 2022-09-07 PROCEDURE — 25010000002 HEPARIN (PORCINE) PER 1000 UNITS: Performed by: INTERNAL MEDICINE

## 2022-09-07 PROCEDURE — 86140 C-REACTIVE PROTEIN: CPT | Performed by: STUDENT IN AN ORGANIZED HEALTH CARE EDUCATION/TRAINING PROGRAM

## 2022-09-07 PROCEDURE — 82962 GLUCOSE BLOOD TEST: CPT

## 2022-09-07 PROCEDURE — 93925 LOWER EXTREMITY STUDY: CPT

## 2022-09-07 PROCEDURE — 94760 N-INVAS EAR/PLS OXIMETRY 1: CPT

## 2022-09-07 PROCEDURE — 73718 MRI LOWER EXTREMITY W/O DYE: CPT

## 2022-09-07 PROCEDURE — 25010000002 MORPHINE PER 10 MG: Performed by: STUDENT IN AN ORGANIZED HEALTH CARE EDUCATION/TRAINING PROGRAM

## 2022-09-07 PROCEDURE — 63710000001 INSULIN DETEMIR PER 5 UNITS: Performed by: STUDENT IN AN ORGANIZED HEALTH CARE EDUCATION/TRAINING PROGRAM

## 2022-09-07 PROCEDURE — 94640 AIRWAY INHALATION TREATMENT: CPT

## 2022-09-07 PROCEDURE — 85025 COMPLETE CBC W/AUTO DIFF WBC: CPT | Performed by: STUDENT IN AN ORGANIZED HEALTH CARE EDUCATION/TRAINING PROGRAM

## 2022-09-07 PROCEDURE — 5A1D70Z PERFORMANCE OF URINARY FILTRATION, INTERMITTENT, LESS THAN 6 HOURS PER DAY: ICD-10-PCS | Performed by: INTERNAL MEDICINE

## 2022-09-07 PROCEDURE — 85610 PROTHROMBIN TIME: CPT | Performed by: STUDENT IN AN ORGANIZED HEALTH CARE EDUCATION/TRAINING PROGRAM

## 2022-09-07 PROCEDURE — 80053 COMPREHEN METABOLIC PANEL: CPT | Performed by: STUDENT IN AN ORGANIZED HEALTH CARE EDUCATION/TRAINING PROGRAM

## 2022-09-07 PROCEDURE — 99221 1ST HOSP IP/OBS SF/LOW 40: CPT | Performed by: PODIATRIST

## 2022-09-07 PROCEDURE — 25010000002 VANCOMYCIN 1 G RECONSTITUTED SOLUTION: Performed by: FAMILY MEDICINE

## 2022-09-07 PROCEDURE — 25010000002 EPOETIN ALFA PER 1000 UNITS: Performed by: INTERNAL MEDICINE

## 2022-09-07 PROCEDURE — 80202 ASSAY OF VANCOMYCIN: CPT | Performed by: FAMILY MEDICINE

## 2022-09-07 PROCEDURE — 94799 UNLISTED PULMONARY SVC/PX: CPT

## 2022-09-07 PROCEDURE — 25010000002 PIPERACILLIN SOD-TAZOBACTAM PER 1 G: Performed by: STUDENT IN AN ORGANIZED HEALTH CARE EDUCATION/TRAINING PROGRAM

## 2022-09-07 PROCEDURE — 83036 HEMOGLOBIN GLYCOSYLATED A1C: CPT | Performed by: STUDENT IN AN ORGANIZED HEALTH CARE EDUCATION/TRAINING PROGRAM

## 2022-09-07 RX ORDER — NYSTATIN 100000 [USP'U]/G
POWDER TOPICAL EVERY 12 HOURS SCHEDULED
Status: DISCONTINUED | OUTPATIENT
Start: 2022-09-07 | End: 2022-09-10 | Stop reason: HOSPADM

## 2022-09-07 RX ORDER — ALBUMIN (HUMAN) 12.5 G/50ML
12.5 SOLUTION INTRAVENOUS AS NEEDED
Status: ACTIVE | OUTPATIENT
Start: 2022-09-07 | End: 2022-09-08

## 2022-09-07 RX ORDER — HEPARIN SODIUM 1000 [USP'U]/ML
4000 INJECTION, SOLUTION INTRAVENOUS; SUBCUTANEOUS AS NEEDED
Status: DISCONTINUED | OUTPATIENT
Start: 2022-09-07 | End: 2022-09-10 | Stop reason: HOSPADM

## 2022-09-07 RX ADMIN — Medication 10 ML: at 00:56

## 2022-09-07 RX ADMIN — IPRATROPIUM BROMIDE AND ALBUTEROL SULFATE 3 ML: 2.5; .5 SOLUTION RESPIRATORY (INHALATION) at 06:29

## 2022-09-07 RX ADMIN — DOCUSATE SODIUM 50 MG AND SENNOSIDES 8.6 MG 2 TABLET: 8.6; 5 TABLET, FILM COATED ORAL at 21:46

## 2022-09-07 RX ADMIN — INSULIN DETEMIR 30 UNITS: 100 INJECTION, SOLUTION SUBCUTANEOUS at 09:27

## 2022-09-07 RX ADMIN — GABAPENTIN 300 MG: 300 CAPSULE ORAL at 09:26

## 2022-09-07 RX ADMIN — NYSTATIN: 100000 POWDER TOPICAL at 12:40

## 2022-09-07 RX ADMIN — SEVELAMER CARBONATE 800 MG: 800 TABLET, FILM COATED ORAL at 18:54

## 2022-09-07 RX ADMIN — SEVELAMER CARBONATE 800 MG: 800 TABLET, FILM COATED ORAL at 08:00

## 2022-09-07 RX ADMIN — IPRATROPIUM BROMIDE AND ALBUTEROL SULFATE 3 ML: 2.5; .5 SOLUTION RESPIRATORY (INHALATION) at 00:31

## 2022-09-07 RX ADMIN — DEXTROSE 15 G: 15 GEL ORAL at 18:15

## 2022-09-07 RX ADMIN — INSULIN DETEMIR 30 UNITS: 100 INJECTION, SOLUTION SUBCUTANEOUS at 00:47

## 2022-09-07 RX ADMIN — IPRATROPIUM BROMIDE AND ALBUTEROL SULFATE 3 ML: 2.5; .5 SOLUTION RESPIRATORY (INHALATION) at 15:10

## 2022-09-07 RX ADMIN — EPOETIN ALFA 6000 UNITS: 10000 SOLUTION INTRAVENOUS; SUBCUTANEOUS at 18:57

## 2022-09-07 RX ADMIN — NYSTATIN: 100000 POWDER TOPICAL at 21:56

## 2022-09-07 RX ADMIN — INSULIN ASPART 24 UNITS: 100 INJECTION, SOLUTION INTRAVENOUS; SUBCUTANEOUS at 07:46

## 2022-09-07 RX ADMIN — COLLAGENASE SANTYL 1 APPLICATION: 250 OINTMENT TOPICAL at 09:36

## 2022-09-07 RX ADMIN — ENOXAPARIN SODIUM 30 MG: 30 INJECTION SUBCUTANEOUS at 00:46

## 2022-09-07 RX ADMIN — Medication 10 ML: at 09:36

## 2022-09-07 RX ADMIN — ASPIRIN 81 MG: 81 TABLET, FILM COATED ORAL at 09:25

## 2022-09-07 RX ADMIN — MORPHINE SULFATE 1 MG: 2 INJECTION, SOLUTION INTRAMUSCULAR; INTRAVENOUS at 00:46

## 2022-09-07 RX ADMIN — ISOSORBIDE MONONITRATE 30 MG: 30 TABLET, EXTENDED RELEASE ORAL at 06:40

## 2022-09-07 RX ADMIN — INSULIN ASPART 30 UNITS: 100 INJECTION, SOLUTION INTRAVENOUS; SUBCUTANEOUS at 12:39

## 2022-09-07 RX ADMIN — PIPERACILLIN SODIUM AND TAZOBACTAM SODIUM 2.25 G: 2; .25 INJECTION, POWDER, LYOPHILIZED, FOR SOLUTION INTRAVENOUS at 12:39

## 2022-09-07 RX ADMIN — DOCUSATE SODIUM 50 MG AND SENNOSIDES 8.6 MG 2 TABLET: 8.6; 5 TABLET, FILM COATED ORAL at 09:35

## 2022-09-07 RX ADMIN — SEVELAMER CARBONATE 800 MG: 800 TABLET, FILM COATED ORAL at 12:40

## 2022-09-07 RX ADMIN — RANOLAZINE 500 MG: 500 TABLET, FILM COATED, EXTENDED RELEASE ORAL at 21:46

## 2022-09-07 RX ADMIN — CLOPIDOGREL BISULFATE 75 MG: 75 TABLET ORAL at 09:26

## 2022-09-07 RX ADMIN — VANCOMYCIN HYDROCHLORIDE 1000 MG: 1 INJECTION, POWDER, LYOPHILIZED, FOR SOLUTION INTRAVENOUS at 21:55

## 2022-09-07 RX ADMIN — LEVOTHYROXINE SODIUM 25 MCG: 25 TABLET ORAL at 09:35

## 2022-09-07 RX ADMIN — PANTOPRAZOLE SODIUM 40 MG: 40 TABLET, DELAYED RELEASE ORAL at 21:46

## 2022-09-07 RX ADMIN — MONTELUKAST 10 MG: 10 TABLET, FILM COATED ORAL at 21:46

## 2022-09-07 RX ADMIN — PIPERACILLIN SODIUM AND TAZOBACTAM SODIUM 3.38 G: 3; .375 INJECTION, POWDER, LYOPHILIZED, FOR SOLUTION INTRAVENOUS at 02:08

## 2022-09-07 RX ADMIN — ENOXAPARIN SODIUM 30 MG: 30 INJECTION SUBCUTANEOUS at 21:46

## 2022-09-07 RX ADMIN — LISINOPRIL 20 MG: 20 TABLET ORAL at 09:26

## 2022-09-07 RX ADMIN — DEXTROSE 15 G: 15 GEL ORAL at 17:51

## 2022-09-07 RX ADMIN — OXYBUTYNIN CHLORIDE 5 MG: 5 TABLET, EXTENDED RELEASE ORAL at 09:26

## 2022-09-07 RX ADMIN — METOPROLOL TARTRATE 25 MG: 25 TABLET, FILM COATED ORAL at 09:35

## 2022-09-07 RX ADMIN — RANOLAZINE 500 MG: 500 TABLET, FILM COATED, EXTENDED RELEASE ORAL at 09:35

## 2022-09-07 RX ADMIN — HEPARIN SODIUM 4000 UNITS: 1000 INJECTION, SOLUTION INTRAVENOUS; SUBCUTANEOUS at 18:55

## 2022-09-07 RX ADMIN — INSULIN ASPART 30 UNITS: 100 INJECTION, SOLUTION INTRAVENOUS; SUBCUTANEOUS at 06:30

## 2022-09-07 RX ADMIN — CETIRIZINE HYDROCHLORIDE 5 MG: 5 TABLET ORAL at 09:25

## 2022-09-07 RX ADMIN — INSULIN ASPART 16 UNITS: 100 INJECTION, SOLUTION INTRAVENOUS; SUBCUTANEOUS at 12:38

## 2022-09-07 RX ADMIN — METOPROLOL TARTRATE 25 MG: 25 TABLET, FILM COATED ORAL at 21:46

## 2022-09-07 RX ADMIN — ATORVASTATIN CALCIUM 20 MG: 20 TABLET, FILM COATED ORAL at 09:26

## 2022-09-07 RX ADMIN — IPRATROPIUM BROMIDE AND ALBUTEROL SULFATE 3 ML: 2.5; .5 SOLUTION RESPIRATORY (INHALATION) at 20:00

## 2022-09-07 RX ADMIN — Medication 10 ML: at 21:46

## 2022-09-07 NOTE — CONSULTS
"Adult Nutrition  Assessment    Patient Name:  Yamileth Slater  YOB: 1960  MRN: [id number]  Admit Date:  9/6/2022    Assessment Date:  9/7/2022    Comments:  61yo female admit w/ worsening toe wounds- MD notes sepsis r/t gangrene. Also noted wounds to left heel and left shin. Hx ESRD w/ HD, COPD, CAD and DM (A1C currently 11H). Alb 2.6L. Cardiac/renal ADA diet, intakes 100% x2 meals. Pt in MRI - spouse in [facility], no c/s problems. Reports good appete, Unsure of wt hx/changes. He states she is tryting to \"cut out dessert\" d/t high Glu. # w/ BMI 46.4, indicates morbid obesity. Per EPIC April 2022 298#, 7/6 268#. ? Wt loss r/t elev A1C, or possibly intentional. Will alert FNS re: fruit to replace desserts. Will attempt diet education re? Aic and wt, increased protein needs with pt on next visit. RD to follow hospital course.     Reason for Assessment     Row Name 09/07/22 1323          Reason for Assessment    Reason For Assessment nurse/nurse practitioner consult     Diagnosis infection/sepsis     Identified At Risk by Screening Criteria large or nonhealing wound, burn or pressure injury                Nutrition/Diet History     Row Name 09/07/22 1324          Nutrition/Diet History    Typical Intake (Food/Fluid/EN/PN) Pt in MRI - spouse in [facility], no c/s problems. Reports good appete, Unsure of wt hx/changes. He states she is tryting to \"cut out dessert\" d/t high Glu                Labs/Tests/Procedures/Meds     Row Name 09/07/22 1326          Labs/Procedures/Meds    Lab Results Reviewed reviewed     Lab Results Comments A1C 11, Glu 470, GFR 15.8, Alb 2.6L            Diagnostic Tests/Procedures    Diagnostic Test/Procedure Reviewed reviewed     Diagnostic Test/Procedures Comments MRI--- does HD            Medications    Pertinent Medications Reviewed reviewed     Pertinent Medications Comments bumex 4xwk, SSI, novolog 30u TID, levemir 30u BID, renvela, bowel meds     "              Estimated/Assessed Needs - Anthropometrics     Row Name 09/07/22 1327          Anthropometrics    Weight for Calculation 66.2 kg (146 lb)  ised adj IBW, #            Estimated/Assessed Needs    Additional Documentation KCAL/KG (Group);Protein Requirements (Group);Estimated Calorie Needs (Group);Fluid Requirements (Group)            Estimated Calorie Needs    Estimated Calorie Requirement (kcal/day) 1700            KCAL/KG    KCAL/KG 25 Kcal/Kg (kcal)     25 Kcal/Kg (kcal) 1655.625            Protein Requirements    Weight Used For Protein Calculations 66.2 kg (146 lb)     Est Protein Requirement Amount (gms/kg) 1.2 gm protein     Estimated Protein Requirements (gms/day) 79.47            Fluid Requirements    Fluid Requirements (mL/day) 1700     RDA Method (mL) 1700                Nutrition Prescription Ordered     Row Name 09/07/22 1329          Nutrition Prescription PO    Current PO Diet Regular     Common Modifiers Cardiac;Consistent Carbohydrate;Renal                Evaluation of Received Nutrient/Fluid Intake     Row Name 09/07/22 1329          PO Evaluation    Number of Meals 2     % PO Intake 100x2                   Electronically signed by:  Roma Simms RD  09/07/22 13:31 CDT

## 2022-09-07 NOTE — SIGNIFICANT NOTE
09/07/22 0839   OTHER   Discipline physical therapist   Rehab Time/Intention   Session Not Performed patient/family declined evaluation   Recommendation   PT - Next Appointment 09/08/22     Initial PT evaluation attempted.  Patient refused, reports he toes are too sore, is anxious about possible surgery.  Offered support, adjusted room heat; will updated RN.

## 2022-09-07 NOTE — ED NOTES
Left second toe noted to be black with wounds present.  Right great toe black with noted wounds and second toe reddened and wound noted

## 2022-09-07 NOTE — CONSULTS
[facility]  [address] [place] [address]. [address]  T - [phone]  F - [phone]     Consultation         PATIENT  DEMOGRAPHICS   PATIENT NAME: Yamileth Sylvester Bryon                      PHYSICIAN: BRIDGETT Hadley  : 1960  MRN: [id number]    Subjective   SUBJECTIVE   Referring Provider: Dr. Macias  Reason for Consultation: ESRD on HD  History of present illness:  This is a 61-year-old female with a past medical history significant for COPD, CKD/ESRD, CAD, DM 2, hypertension, obesity, PVD who presented to the hospital with complaints of worsening toe wounds. She was admitted with sepsis and gangrene of the toes. Nephrology is consulted to manage ESRD on HD.    Past Medical History:   Diagnosis Date   • Acute blood loss anemia 2017    Likely due to gastric oozing at this time. - Dr. Duarte (GI) was consulted and has now signed off, will follow up outpatient - pill colonoscopy showed AVMs - continue to monitor   • Acute cystitis with hematuria 3/31/2021    1/13: IV Rocephin 1 gm q 24  : transitioned  to omnicef 300mg. Urine cultures resulted and did not show growth. Omnicef discontinued as patient is asymptomatic   • Altered mental status 2022    - AMS on presentation - initial ABG pH 7.3, CO2 34 - Procal 0.29 - UA negative for acute cysitits -CTA head wnl  - Empiric Zosyn and Vancomycin -Lactate 2.5 on admission  - blood cultures no growth at 24 hours     • Anxiety    • CAD (coronary artery disease) 2021    S/P 3 stents 2021 for BHL Continue ASA 81mg & Clopidogrel 75mg Continue Atorvastatin 40mg   • Carotid artery stenosis    • Chronic obstructive lung disease (HCC)    • CKD (chronic kidney disease) stage 4, GFR 15-29 ml/min ([facility])    • CKD (chronic kidney disease), symptom management only, stage 5 (HCC) 10/5/2020    Results from last 7 days Lab Units 12/15/21 0548 21 1323 21 0916 CREATININE mg/dL 3.92* 3.21* 3.32*  Baseline creatinine 2-3 GFR 13-25  GFR 15 Dialysis MWF, sees Dr. Lauren Nephrology consult,, appreciate recommendations Continue Bumex 1mg bid daily Holding Bumex 2mg 4 times a week   • Colonic polyp    • Coronary arteriosclerosis    • Diabetes mellitus (HCC)    • Diabetic neuropathy (HCC)    • Ear pain, right 10/18/2021    - canal trauma due to patient scratching and DMT2 - added cortisporin ear drops   • Elevated troponin 10/12/2021    -most likely from CKD -Trending down -Neg chest pain   • Generalized abdominal pain 7/1/2022    Could be due to initiation of tube feeds vs dyspepsia vs abdominal cramps related to no PO intake due to intubation vs constipation Continue current laxative regiment  If no bowel movement by this afternoon will consider enema   • GERD (gastroesophageal reflux disease)    • GI bleed 5/13/2021    - GI will follow up outpatient - Protonix 40mg daily - Avoid medical DVT prophy and use mechanical at this time instead. - Continue to monitor - pill colonoscopy results showed AVMs   • History of transfusion    • Hypercholesterolemia    • Hyperosmolar hyperglycemic state (HHS) ([facility]) 6/25/2022    Serum glucose 605 on admission  Anion gap 16 PH 7.37 Bicarb 27.9 Continue fluids  Insulin drip with HHS protocol  Anion gap closed around 10 AM, received one dose of Levamir subq, will stop insulin drip after 2 hrs     • Hypertension    • Hypokalemia 5/27/2022    Will replace as needed. Will be cautious in the setting of ESRD to avoid need for emergency dialysis   Monitor Qtc intervals on EKG     • Hypomagnesemia 6/27/2021    Monitor and replace   • Morbid obesity (HCC)    • Nephrolithiasis    • On mechanically assisted ventilation (HCC) 6/26/2022    Vent management and sedation orders placed.  - [facility] intensivist group consulted for vent management appreciate recommendations  - plan to extubate today     • Peripheral vascular disease (HCC)    • Pleural effusion on right 6/26/2022    CXR on 6/30/22 read as a small upper left  pulmonary edema vs early pneumonia.  Last Echo 1/2022 EF 61-65 % Continue to monitor  Procal slightly improved, CRP improved On Linezolid and meropenum      • SIRS (systemic inflammatory response syndrome) ([facility]) 1/9/2022    Admission  - WBC 17.78   -   - RR 16 - 1/10: VSS/wnl - CXR - Mild pulm edema - Blood cultures no growth at 24 hours  - Procalcitonin 0.29 - UA : glucose 1000, negative Leucocytes/nitrate - Empiric Zosyn and Vancomcyin    • Sleep apnea    • Substance abuse ([facility])    • Vitamin D deficiency      Past Surgical History:   Procedure Laterality Date   • CARDIAC CATHETERIZATION N/A 7/14/2020   • CARDIAC CATHETERIZATION N/A 4/23/2021    Procedure: Left Heart Cath;  Surgeon: Melba Romo MD;  Location: [facility] CATH INVASIVE LOCATION;  Service: Cardiology;  Laterality: N/A;   • CARDIAC CATHETERIZATION N/A 4/30/2021    Procedure: Percutaneous Coronary Intervention;  Surgeon: Russell Voss MD;  Location: [facility] CATH INVASIVE LOCATION;  Service: Cardiovascular;  Laterality: N/A;   • CARDIAC CATHETERIZATION N/A 4/30/2021    Procedure: Stent NIKKI coronary;  Surgeon: Russell Voss MD;  Location: [facility] CATH INVASIVE LOCATION;  Service: Cardiovascular;  Laterality: N/A;   • CARDIAC CATHETERIZATION Left 11/13/2021    Procedure: Left Heart Cath;  Surgeon: Niall Rios MD;  Location: [facility] CATH INVASIVE LOCATION;  Service: Cardiology;  Laterality: Left;   • CAROTID STENT Left    • COLONOSCOPY     • COLONOSCOPY N/A 5/14/2021    Procedure: COLONOSCOPY;  Surgeon: Mingo Duarte MD;  Location: [facility] ENDOSCOPY;  Service: Gastroenterology;  Laterality: N/A;   • CORONARY ARTERY BYPASS GRAFT N/A 2013    CABG X 3   • CYSTOSCOPY BLADDER STONE LITHOTRIPSY Bilateral    • ENDOSCOPY N/A 4/12/2021    Procedure: ESOPHAGOGASTRODUODENOSCOPY;  Surgeon: Mingo Duarte MD;  Location: [facility] ENDOSCOPY;  Service: Gastroenterology;  Laterality: N/A;   • ENDOSCOPY N/A 5/14/2021    Procedure:  ESOPHAGOGASTRODUODENOSCOPY;  Surgeon: Mingo Duarte MD;  Location: [facility] ENDOSCOPY;  Service: Gastroenterology;  Laterality: N/A;   • ENDOSCOPY N/A 2022    Procedure: ESOPHAGOGASTRODUODENOSCOPY;  Surgeon: Mingo Duarte MD;  Location: [facility] ENDOSCOPY;  Service: Gastroenterology;  Laterality: N/A;   • INTERVENTIONAL RADIOLOGY PROCEDURE N/A 10/21/2021    Procedure: tunneled central venous catheter placement;  Surgeon: Donnie Robles MD;  Location: [facility] ANGIO INVASIVE LOCATION;  Service: Interventional Radiology;  Laterality: N/A;   • INTERVENTIONAL RADIOLOGY PROCEDURE N/A 2022    Procedure: tunneled central venous catheter placement;  Surgeon: Donnie Robles MD;  Location: [facility] ANGIO INVASIVE LOCATION;  Service: Interventional Radiology;  Laterality: N/A;     Family History   Problem Relation Age of Onset   • Heart disease Mother    • Lung cancer Mother    • Heart disease Father    • Heart attack Father    • Diabetes Father    • Heart disease Half-Sister         Dad's side   • Heart disease Brother    • No Known Problems Sister    • No Known Problems Sister    • No Known Problems Sister    • No Known Problems Sister    • No Known Problems Sister    • Pancreatic cancer Half-Sister         Dad's side   • No Known Problems Brother    • No Known Problems Brother    • Heart attack Half-Brother    • Heart attack Half-Brother    • No Known Problems Maternal Grandmother    • No Known Problems Maternal Grandfather    • No Known Problems Paternal Grandmother    • No Known Problems Paternal Grandfather      Social History     Tobacco Use   • Smoking status: Former Smoker     Packs/day: 0.25     Years: 46.00     Pack years: 11.50     Types: Cigarettes     Start date: 1999     Quit date: 2/15/2022     Years since quittin.5   • Smokeless tobacco: Never Used   • Tobacco comment: only smoking 5 a day - quit 2021   Substance Use Topics   • Alcohol use: No   • Drug use: Not  "Currently     Types: LSD, Marijuana, Methamphetamines     Allergies:  Adhesive tape, Nsaids, Latex, and Other     REVIEW OF SYSTEMS    Review of Systems   All other systems reviewed and are negative.      Objective   OBJECTIVE   Vital Signs  Temp:  [97 °F (36.1 °C)-99 °F (37.2 °C)] 97 °F (36.1 °C)  Heart Rate:  [] 82  Resp:  [18-22] 18  BP: (108-193)/(54-79) 108/54    Flowsheet Rows    Flowsheet Row First Filed Value   Admission Height 157.5 cm (62\") Documented at 09/06/2022 1854   Admission Weight 115 kg (254 lb) Documented at 09/06/2022 1854           I/O last 3 completed shifts:  In: 120 [P.O.:120]  Out: 500 [Urine:500]    PHYSICAL EXAM    Physical Exam  Constitutional:       Appearance: She is well-developed.   HENT:      Head: Normocephalic and atraumatic.   Eyes:      Conjunctiva/sclera: Conjunctivae normal.      Pupils: Pupils are equal, round, and reactive to light.   Cardiovascular:      Rate and Rhythm: Normal rate and regular rhythm.   Pulmonary:      Effort: Pulmonary effort is normal.      Breath sounds: Normal breath sounds.   Abdominal:      Palpations: Abdomen is soft.   Musculoskeletal:      Cervical back: Neck supple.      Right lower leg: No edema.      Left lower leg: No edema.   Skin:     General: Skin is warm and dry.   Neurological:      Mental Status: She is alert and oriented to person, place, and time.   Psychiatric:         Mood and Affect: Mood normal.         Behavior: Behavior normal.         RESULTS   Results Review:    Results from last 7 days   Lab Units 09/07/22  0650 09/06/22 2058   SODIUM mmol/L 130* 129*   POTASSIUM mmol/L 3.7 3.7   CHLORIDE mmol/L 92* 89*   CO2 mmol/L 28.0 28.0   BUN mg/dL 42* 43*   CREATININE mg/dL 3.19* 3.18*   CALCIUM mg/dL 8.8 9.1   BILIRUBIN mg/dL 0.4 0.4   ALK PHOS U/L 172* 194*   ALT (SGPT) U/L <5 5   AST (SGOT) U/L 7 9   GLUCOSE mg/dL 452* 549*       Estimated Creatinine Clearance: 21.7 mL/min (A) (by C-G formula based on SCr of 3.19 mg/dL " (H)).                Results from last 7 days   Lab Units 09/07/22  0651 09/06/22  2058   WBC 10*3/mm3 12.47* 14.21*   HEMOGLOBIN g/dL 9.7* 10.4*   PLATELETS 10*3/mm3 230 224       Results from last 7 days   Lab Units 09/07/22  0650   INR  1.25*        MEDICATIONS    aspirin, 81 mg, Oral, Daily  atorvastatin, 20 mg, Oral, Daily  [START ON 9/8/2022] bumetanide, 2 mg, Oral, Once per day on Sun Tue Thu Sat  cetirizine, 5 mg, Oral, Daily  clopidogrel, 75 mg, Oral, Daily  collagenase, 1 application, Topical, Daily  enoxaparin, 30 mg, Subcutaneous, Nightly  epoetin hubert/hubert-epbx, 6,000 Units, Intravenous, Once per day on Mon Wed Fri  gabapentin, 300 mg, Oral, Daily  Insulin Aspart, 0-24 Units, Subcutaneous, TID AC  Insulin Aspart, 30 Units, Subcutaneous, TID With Meals  insulin detemir, 30 Units, Subcutaneous, Q12H  ipratropium-albuterol, 3 mL, Nebulization, 4x Daily - RT  isosorbide mononitrate, 30 mg, Oral, QAM  levothyroxine, 25 mcg, Oral, Daily  lisinopril, 20 mg, Oral, Daily  metoprolol tartrate, 25 mg, Oral, Q12H  montelukast, 10 mg, Oral, Nightly  nystatin, , Topical, Q12H  oxybutynin XL, 5 mg, Oral, Daily  pantoprazole, 40 mg, Oral, Nightly  piperacillin-tazobactam, 2.25 g, Intravenous, Q12H  ranolazine, 500 mg, Oral, Q12H  senna-docusate sodium, 2 tablet, Oral, BID  sevelamer, 800 mg, Oral, TID With Meals  sodium chloride, 10 mL, Intravenous, Q12H      Pharmacy to Dose enoxaparin (LOVENOX),   Pharmacy to dose vancomycin,   Pharmacy to Dose Zosyn,       Medications Prior to Admission   Medication Sig Dispense Refill Last Dose   • albuterol (PROVENTIL) (2.5 MG/3ML) 0.083% nebulizer solution Inhale the contents of 1 vial by nebulization Every 4 (Four) Hours As Needed for Wheezing. 75 mL 3 Past Month at Unknown time   • albuterol sulfate  (90 Base) MCG/ACT inhaler Inhale 2 puffs Every 4 (Four) Hours As Needed for Wheezing. 18 g 1 9/6/2022 at 1000   • aspirin (aspirin) 81 MG EC tablet Take 1 tablet by mouth  Daily. 60 tablet 5 9/1/2022 at 1000   • atorvastatin (LIPITOR) 20 MG tablet Take 1 tablet by mouth every night at bedtime. 90 tablet 0 9/6/2022 at 1000   • bumetanide (BUMEX) 2 MG tablet Take 1 tablet by mouth 4 (Four) Times a Week. Take on non-hemodialysis days. 16 tablet 0 9/5/2022 at 1000   • Cetirizine HCl 10 MG capsule Take 1 capsule by mouth Daily.   9/6/2022 at 1900   • Diclofenac Sodium (VOLTAREN) 1 % gel gel Apply 4 g topically to the appropriate area as directed 4 (Four) Times a Day As Needed (Ankle pain). 350 g 2 Past Month at Unknown time   • gabapentin (NEURONTIN) 300 MG capsule TAKE 1 CAPSULE BY MOUTH DAILY. AND AN EXTRA PILL MONDAY/WEDNESDAY/FRIDAY - DIALYSIS DAYS 45 capsule 2 9/6/2022 at 1000   • isosorbide mononitrate (IMDUR) 30 MG 24 hr tablet Take 1 tablet by mouth Every Morning. 90 tablet 1 9/6/2022 at 1000   • lisinopril (PRINIVIL,ZESTRIL) 20 MG tablet Take 1 tablet by mouth Daily. 90 tablet 0 9/6/2022 at 1000   • metoprolol tartrate (LOPRESSOR) 25 MG tablet Take 1 tablet by mouth Every 12 (Twelve) Hours. 180 tablet 0 9/6/2022 at 1900   • montelukast (SINGULAIR) 10 MG tablet Take 1 tablet by mouth Every Night. 90 tablet 0 9/6/2022 at 1900   • O2 (OXYGEN) Inhale 3 L/min Continuous.   9/7/2022 at Unknown time   • oxybutynin XL (DITROPAN-XL) 5 MG 24 hr tablet Take 1 tablet by mouth Daily. 90 tablet 0 9/6/2022 at 1000   • pantoprazole (PROTONIX) 40 MG EC tablet Take 1 tablet by mouth Every Night. 90 tablet 0 9/6/2022 at 1900   • ranolazine (RANEXA) 500 MG 12 hr tablet Take 1 tablet by mouth Every 12 (Twelve) Hours. 180 tablet 0 9/6/2022 at 1900   • sevelamer (RENVELA) 800 MG tablet Take 800 mg by mouth 3 (Three) Times a Day With Meals.   9/6/2022 at 1600   • sodium bicarbonate 650 MG tablet Take 1 tablet by mouth.   9/6/2022 at 1000   • acetaminophen (Tylenol 8 Hour) 650 MG 8 hr tablet Take 1 tablet by mouth Every 8 (Eight) Hours As Needed for Mild Pain . 90 tablet 0 More than a month at Unknown  "time   • Blood Glucose Monitoring Suppl ([employer] Blood Glucose Meter) w/Device kit 1 each 3 (Three) Times a Day. 1 kit 3    • Capsaicin 0.035 % cream Apply 3 g topically 3 (Three) Times a Day As Needed (ankle pain). 42.5 g 2 Unknown at Unknown time   • clopidogrel (PLAVIX) 75 MG tablet Take 1 tablet by mouth Daily. 30 tablet 5 9/1/2022 at 1000   • collagenase (Santyl) 250 UNIT/GM ointment Apply 1 application topically to the appropriate area as directed Daily. 90 g 2    • DULoxetine (CYMBALTA) 20 MG capsule Take 2 capsules by mouth Daily for 90 days. 180 capsule 0    • Easy Touch Insulin Syringe 30G X 5/16\" 0.5 ML misc USE AS DIRECTED WITH LEVEMIR      • EASY TOUCH PEN NEEDLES 31G X 8 MM misc       • glucose blood test strip Use as instructed 100 each 12    • insulin detemir (LEVEMIR) 100 UNIT/ML injection Inject 25 Units under the skin into the appropriate area as directed Every Night. 3 mL 12    • Insulin Lispro, 1 Unit Dial, (HUMALOG) 100 UNIT/ML solution pen-injector Inject 12 Units under the skin into the appropriate area as directed 3 (Three) Times a Day With Meals. 30 mL 12    • Insulin Pen Needle (NovoFine) 30G X 8 MM misc As directed 4 times daily 100 each 11    • Insulin Pen Needle 31G X 8 MM misc Use to inject insulin 4 (Four) Times a Day as directed. 120 each 12    • ipratropium-albuterol (DUO-NEB) 0.5-2.5 mg/3 ml nebulizer Take 3 mL by nebulization 4 (Four) Times a Day.   More than a month at Unknown time   • levothyroxine (SYNTHROID, LEVOTHROID) 25 MCG tablet Take 25 mcg by mouth Daily.   More than a month at Unknown time   • Methoxy PEG-Epoetin Beta (MIRCERA IJ) 150 mcg Every 14 (Fourteen) Days.      • Methoxy PEG-Epoetin Beta (MIRCERA IJ) 225 mcg Every 14 (Fourteen) Days.   Unknown at Unknown time   • muscle rub (BenGay) 10-15 % cream cream Apply 1 application topically to the appropriate area as directed 4 (Four) Times a Day As Needed for buttock pain. 85 g 1 More than a month at Unknown time   • " nitroglycerin (NITROSTAT) 0.4 MG SL tablet Place 0.4 mg under the tongue Every 5 (Five) Minutes As Needed for Chest Pain (x 3 doses).   Unknown at Unknown time   • ondansetron ODT (Zofran ODT) 4 MG disintegrating tablet Place 1 tablet on the tongue Every 8 (Eight) Hours As Needed for Nausea or Vomiting. 30 tablet 0    • promethazine (PHENERGAN) 25 MG suppository Insert  into the rectum Every 6 (Six) Hours.   More than a month at Unknown time   • Semaglutide (Rybelsus) 7 MG tablet Take 7 mg by mouth Daily. 90 tablet 0 More than a month at Unknown time     Assessment & Plan   ASSESSMENT / PLAN      GERD (gastroesophageal reflux disease)    Hypertension    Hypothyroidism    MIKE and COPD overlap syndrome ([facility])    Cellulitis of left leg    Type 2 diabetes mellitus with autonomic neuropathy ([facility])    Physical deconditioning    ESRD on hemodialysis ([facility])    Anemia    Sepsis without acute organ dysfunction, due to unspecified organism ([facility])    Gangrene of both feet ([facility])    1. ESRD on HD MWF:  - Dialysis dependent since October 2021.  On dialysis at [facility].   - Plan HD today.     2. Anemia of chronic kidney disease:  - Patient had extensive work-up in the past by Dr. Munoz. She also has B12 deficiency. She has history of AVM.    - Hemoglobin is acceptable at 9.7     3. Hypertension:  - Well controlled     4. Sepsis/Gangrene:  - Manage per primary team. May need podiatry consultation.     5. Diabetes type 2:  - Management per primary team.      6. Coronary artery disease     7. Chronic kidney disease/mineral and bone disorder:  - On sevelamer      Thank you for the consult, we will continue to follow this patient.         I discussed the patients findings and my recommendations with patient      This document has been electronically signed by BRIDGETT Hadley on September 7, 2022 13:25 CDT      For this patient encounter, I have reviewed the Nurse Practitioner's documentation, medical decision making, and  treatment plan.

## 2022-09-07 NOTE — PLAN OF CARE
Goal Outcome Evaluation:     Pt has been resting in bed with NAD noted at this time. No C/O pain this shift. VSS.

## 2022-09-07 NOTE — ED NOTES
Nursing report ED to floor  Yamileth Slater  62 y.o.  female    HPI:   Chief Complaint   Patient presents with   • Wound Check       Admitting doctor:   Kit Brar MD    Consulting provider(s):  Consults     No orders found from 8/8/2022 to 9/7/2022.           Admitting diagnosis:   The primary encounter diagnosis was Sepsis without acute organ dysfunction, due to unspecified organism (HCC). Diagnoses of Diabetic foot ulcer associated with type 2 diabetes mellitus, unspecified laterality, unspecified part of foot, unspecified ulcer stage (HCC) and Hyperglycemia were also pertinent to this visit.    Code status:   Current Code Status     Date Active Code Status Order ID Comments User Context       Prior    Advance Care Planning Activity          Allergies:   Adhesive tape, Nsaids, Latex, and Other    Intake and Output  No intake or output data in the 24 hours ending 09/06/22 2259    Weight:       09/06/22  1854   Weight: 115 kg (254 lb)       Most recent vitals:   Vitals:    09/06/22 2056 09/06/22 2101 09/06/22 2106 09/06/22 2249   BP: 158/68   147/66   Pulse:  94  86   Resp:   18    Temp:       TempSrc:       SpO2:  100%  98%   Weight:       Height:           Active LDAs/IV Access:   Lines, Drains & Airways     Active LDAs     Name Placement date Placement time Site Days    Peripheral IV 09/06/22 2057 Left Antecubital 09/06/22 2057  Antecubital  less than 1    Peripheral IV 09/06/22 2234 Left Wrist 09/06/22  2234  Wrist  less than 1    Hemodialysis Cath Double 01/27/22  1106  Internal Jugular  222                Labs (abnormal labs have a star):   Labs Reviewed   COMPREHENSIVE METABOLIC PANEL - Abnormal; Notable for the following components:       Result Value    Glucose 549 (*)     BUN 43 (*)     Creatinine 3.18 (*)     Sodium 129 (*)     Chloride 89 (*)     Albumin 3.10 (*)     Alkaline Phosphatase 194 (*)     eGFR 15.8 (*)     All other components within normal limits    Narrative:     GFR Normal  >60  Chronic Kidney Disease <60  Kidney Failure <15     CBC WITH AUTO DIFFERENTIAL - Abnormal; Notable for the following components:    WBC 14.21 (*)     Hemoglobin 10.4 (*)     MCHC 30.6 (*)     RDW 16.4 (*)     Neutrophil % 84.5 (*)     Lymphocyte % 7.2 (*)     Immature Grans % 1.0 (*)     Neutrophils, Absolute 12.01 (*)     Immature Grans, Absolute 0.14 (*)     All other components within normal limits   C-REACTIVE PROTEIN - Abnormal; Notable for the following components:    C-Reactive Protein 32.67 (*)     All other components within normal limits   SEDIMENTATION RATE - Abnormal; Notable for the following components:    Sed Rate 114 (*)     All other components within normal limits   LACTIC ACID, PLASMA - Normal   BLOOD CULTURE   BLOOD CULTURE   RAINBOW DRAW    Narrative:     The following orders were created for panel order [place] Draw.  Procedure                               Abnormality         Status                     ---------                               -----------         ------                     Green Top (Gel)[[id number]]                                  Final result               Lavender Top[[id number]]                                     Final result               Gold Top - SST[[id number]]                                                              Light Blue Top[[id number]]                                   Final result                 Please view results for these tests on the individual orders.   CBC AND DIFFERENTIAL    Narrative:     The following orders were created for panel order CBC & Differential.  Procedure                               Abnormality         Status                     ---------                               -----------         ------                     CBC Auto Differential[[id number]]        Abnormal            Final result                 Please view results for these tests on the individual orders.   GREEN TOP   LAVENDER TOP   LIGHT BLUE TOP   GOLD TOP - SST        Meds given in ED:   Medications   sodium chloride 0.9 % flush 10 mL (has no administration in time range)   vancomycin 1750 mg/500 mL 0.9% NS IVPB ([facility]) (1,750 mg Intravenous New Bag 9/6/22 2252)   insulin regular (humuLIN R,novoLIN R) injection 8 Units (8 Units Intravenous Given 9/6/22 2250)           NIH Stroke Scale:       Isolation/Infection(s):  No active isolations   CRE, MRSA     COVID Testing  Collected no  Resulted     Nursing report ED to floor:  Mental status: Alert and oriented  Ambulatory status: assist X1 with walker  Precautions: none    ED nurse phone extentsion- [phone]

## 2022-09-07 NOTE — ACP (ADVANCE CARE PLANNING)
Had a full discussion on code status with this patient at 2222. Patient fully alert and wishes for everything to be done. Risks and benefits discussed. Alejandro would be her surrogate decision maker if the need arises.       Jerman Coffman MD   PGY-3    [place] [address]  Office: [phone]    This document has been electronically signed by Jerman Coffman MD on September 6, 2022 22:24 CDT

## 2022-09-07 NOTE — PROGRESS NOTES
FAMILY MEDICINE RESIDENCY SERVICE  DAILY PROGRESS NOTE    NAME: Yamileth Slater  : 1960  MRN: [id number]      LOS: 1 day     PROVIDER OF SERVICE: Rianna Macias MD    Chief Complaint: Gangrene of both feet (HCC)    Subjective:     Interval History:  History taken from: patient    Patient was wearing her trilogy this morning. She reports pain on the toes bilaterally. No other acute concerns.     Review of Systems:   Review of Systems   Constitutional: Negative for appetite change, diaphoresis, fatigue and fever.   Respiratory: Negative for cough, chest tightness, shortness of breath and wheezing.    Cardiovascular: Negative for chest pain and palpitations.   Gastrointestinal: Negative for abdominal pain, constipation, diarrhea and rectal pain.   Musculoskeletal: Positive for arthralgias and myalgias. Negative for joint swelling.   Skin: Positive for wound.   Neurological: Negative for dizziness.       Objective:     Vital Signs  Temp:  [96.6 °F (35.9 °C)-97.9 °F (36.6 °C)] 96.6 °F (35.9 °C)  Heart Rate:  [61-82] 64  Resp:  [16-18] 18  BP: (100-163)/(54-73) 100/59  Flow (L/min):  [2-3] 2   Body mass index is 46.64 kg/m².    Physical Exam  Physical Exam  Vitals reviewed.   Constitutional:       General: She is not in acute distress.     Comments: Wearing trilogy    HENT:      Head: Normocephalic and atraumatic.   Eyes:      Conjunctiva/sclera: Conjunctivae normal.   Cardiovascular:      Rate and Rhythm: Normal rate and regular rhythm.   Pulmonary:      Effort: Pulmonary effort is normal. No respiratory distress.      Breath sounds: Normal breath sounds.   Abdominal:      Palpations: Abdomen is soft.      Tenderness: There is no abdominal tenderness.   Musculoskeletal:         General: Normal range of motion.        Feet:    Neurological:      Mental Status: She is alert.         Scheduled Meds   aspirin, 81 mg, Oral, Daily  atorvastatin, 20 mg, Oral, Daily  bumetanide, 2 mg, Oral, Once per day on Sun  Tue Thu Sat  cetirizine, 5 mg, Oral, Daily  clopidogrel, 75 mg, Oral, Daily  collagenase, 1 application, Topical, Daily  enoxaparin, 30 mg, Subcutaneous, Nightly  epoetin hubert/hubert-epbx, 6,000 Units, Intravenous, Once per day on Mon Wed Fri  gabapentin, 300 mg, Oral, Daily  hydrocortisone-bacitracin-zinc oxide-nystatin, 1 application, Topical, BID  Insulin Aspart, 0-24 Units, Subcutaneous, TID AC  Insulin Aspart, 30 Units, Subcutaneous, TID With Meals  insulin detemir, 30 Units, Subcutaneous, Q12H  ipratropium-albuterol, 3 mL, Nebulization, 4x Daily - RT  isosorbide mononitrate, 30 mg, Oral, QAM  levothyroxine, 25 mcg, Oral, Daily  lisinopril, 20 mg, Oral, Daily  metoprolol tartrate, 25 mg, Oral, Q12H  montelukast, 10 mg, Oral, Nightly  nystatin, , Topical, Q12H  oxybutynin XL, 5 mg, Oral, Daily  pantoprazole, 40 mg, Oral, Nightly  PATIENT SUPPLIED MEDICATION, 7 mg, Oral, Daily  piperacillin-tazobactam, 2.25 g, Intravenous, Q12H  ranolazine, 500 mg, Oral, Q12H  senna-docusate sodium, 2 tablet, Oral, BID  sevelamer, 800 mg, Oral, TID With Meals  sodium chloride, 10 mL, Intravenous, Q12H     PRN Meds     acetaminophen    albumin human    albuterol    senna-docusate sodium **AND** polyethylene glycol **AND** bisacodyl **AND** bisacodyl    Capsaicin    dextrose    dextrose    glucagon (human recombinant)    heparin (porcine)    melatonin    Morphine **AND** naloxone    nitroglycerin    ondansetron **OR** ondansetron    Pharmacy to Dose enoxaparin (LOVENOX)    Pharmacy to dose vancomycin    Pharmacy to Dose Zosyn    sodium chloride    sodium chloride      Diagnostic Data    Results from last 7 days   Lab Units 09/08/22  0522   WBC 10*3/mm3 10.01   HEMOGLOBIN g/dL 9.5*   HEMATOCRIT % 30.0*   PLATELETS 10*3/mm3 247   GLUCOSE mg/dL 70   CREATININE mg/dL 1.75*   BUN mg/dL 18   SODIUM mmol/L 136   POTASSIUM mmol/L 3.4*   AST (SGOT) U/L 8   ALT (SGPT) U/L <5   ALK PHOS U/L 139*   BILIRUBIN mg/dL 0.3   ANION GAP mmol/L 8.0        MRI Foot Right Without Contrast    Result Date: 9/7/2022  Impression: 1. Severe motion degradation. The technologist notes the patient was repeatedly asked to remain still. Multiple sequences are repeated. Motion decreases sensitivity and specificity of the exam. 2. There appears to be mild bone marrow edema involving the phalanges of the first and second digits without clear accompanying T1 signal changes. Findings are indeterminate for osteomyelitis. Subcutaneous edema of the first and second toes likely cellulitis. Limited sensitivity for abscess. Repeat MRI examination with the addition of gadolinium contrast when the patient is able to hold still, or three-phase bone scan may be considered if increased sensitivity and specificity is desired. 3. Muscle fatty atrophy suggesting presence of a polyneuropathy. Electronically signed by:  Froylan Bennett MD  9/7/2022 1:09 PM CDT Workstation: [id number]    US Arterial Doppler Lower Extremity Complete    Result Date: 9/7/2022  Moderate diffuse atherosclerotic irregularity with no evidence of any significant femoral or popliteal artery stenosis or occlusion. There appears to be three-vessel runoff bilaterally Electronically signed by:  Pablo Rubio MD  9/7/2022 11:52 PM CDT Workstation: [id number]    XR Foot 3+ View Bilateral    Result Date: 9/6/2022  No definite plain radiographic evidence of osteomyelitis in either foot. If there is high clinical concern consider MRI. Electronically signed by:  Eusebio Sargent  9/6/2022 9:05 PM CDT Workstation: [id number]         I reviewed the patient's new clinical results.    Assessment/Plan:     Active and Resolved Problems  Active Hospital Problems    Diagnosis  POA    **Gangrene of both feet ([facility]) [I96]  Unknown    Sepsis without acute organ dysfunction, due to unspecified organism ([facility]) [A41.9]  Yes    Anemia [D64.9]  Yes    ESRD on hemodialysis ([facility]) [N18.6, Z99.2]  Not Applicable    Physical deconditioning [R53.81]  Yes    Type 2  diabetes mellitus with autonomic neuropathy (HCC) [E11.43]  Yes    Cellulitis of foot associated with diabetes mellitus ([facility]) [E11.628, L03.119]  Yes    MIKE and COPD overlap syndrome ([facility]) [G47.33, J44.9]  Yes    Hypothyroidism [E03.9]  Yes    GERD (gastroesophageal reflux disease) [K21.9]  Yes    Hypertension [I10]  Yes      Resolved Hospital Problems   No resolved problems to display.       #Gangrene of both feet:  Known vasculopath and poorly controlled diabetic. Presented with leukocytosis and elevated CRP. Patient with a complex infectious disease history with known MRSA infection and previous CRE infection as well.   - Patient on Vancomycin and Zosyn - need for MRSA and Pseudomonas coverage - pharmacy to help with dosage  - Podiatry consulted, awaiting recommendations  - CRP up-trending from 32.67 to 34.1  - Bilateral MRI of the foot pending  - Sed rate elevated at 114    #Sepsis:  Patient has elevated leukocytosis however not febrile, heart rate has normalized and respiration rate normalized.    - Continue to monitor.    - Lactate was normal at 1.6.   - Blood culture pedning     #Cellulitis:  - Above antibiotic coverage will likely cover for any possible cellulitis.   - Continue to monitor progression via visual exam and response to lab work.     #ESRD on dialysis:  - Patient is on dialysis 3 times a week (M/W/F)  - Last dialysis was 9/1/2022.   - Creatinine slightly above baseline at 3.19  - Nephrology consulted for dialysis  - At current presentation, risks of not appropriately treating sepsis/gangrene/cellulitis outweighs potential nephrotoxic nature of antibiotics listed above.    - Avoid other nephrotoxic agents.     #MIKE/COPD:   - Nasal cannula oxygen to maintain oxygen saturation 82 to 92%.   - Patient may use home trilogy machine.     #Hypothyroidism:   - Continue home levothyroxine dosage.     #Diabetes:  Patient reports home regimen of 40 units long-acting insulin twice a day and 40 units short  acting insulin prandially.   - Patient started on cardiac/consistent carb/renal diet which is vastly different from her home dosage.    - Slight decrease in insulin at this time.   - Current regimen will be Levemir 30 units twice daily and 30 units prandial with high-dose sliding scale insulin.  - Will restart rebylsus 7 mg for better glucose control     #Anemia:   - Chronic and stable.    - Continue to monitor     #Hypertension:   - Restart home medications    DVT prophylaxis:  Medical DVT prophylaxis orders are present.     Code status is   Code Status and Medical Interventions:   Ordered at: 09/06/22 4490     Level Of Support Discussed With:    Patient     Code Status (Patient has no pulse and is not breathing):    CPR (Attempt to Resuscitate)     Medical Interventions (Patient has pulse or is breathing):    Full Support       Plan for disposition:Home with home health care in 2-3 days    Time: 30 minutes    Signature  [place] [address]  Office: [phone]        This document has been electronically signed by Rianna Macias MD on September 8, 2022 07:31 CDT

## 2022-09-07 NOTE — PLAN OF CARE
Goal Outcome Evaluation:  Plan of Care Reviewed With: patient        Progress: improving    Received to room 410 via stretcher as an admit through ER-She's from home with HH.Dx sepsis.Has black narcotic toes on bilteral feet and ulcer to left heel and left shin.Has slightly open red areas to bilateral sacral area.Red under right abdominal fold.With C/O PAIN ble -Morphine given and relief voiced.Using her home TRILOGY with oxygen at 3LMIN.  Has rested well.

## 2022-09-07 NOTE — H&P
HISTORY AND PHYSICAL  NAME: Yamileth Slater  : 1960  MRN: [id number]    DATE OF ADMISSION: 22    DATE & TIME SEEN: 22 23:39 CDT    PCP: Rianna Macias MD    CODE STATUS:   Code Status and Medical Interventions:   Ordered at: 22 9240     Level Of Support Discussed With:    Patient     Code Status (Patient has no pulse and is not breathing):    CPR (Attempt to Resuscitate)     Medical Interventions (Patient has pulse or is breathing):    Full Support       CHIEF COMPLAINT: Toe pain/discoloration    HPI:  Yamileth Slater is a 62 y.o. female with a PMH of ESRD on dialysis MWF, diabetes mellitus, morbid obesity, CAD s/p stents, hyperlipidemia, MIKE/COPD, hypothyroidism   who presents with worsening toe wounds.  Patient initially noticed wound on left second toe and right great toe approximately 3 weeks ago.  Home health has been intermittently coming and helping with topical medications.  Patient has not been on any antibiotics for these infections.  Over the same time patient's blood sugars been difficult to control.  Most recently over the last couple of weeks its been ranging between 400 and 500.  Wounds are on the ventral aspect of the toes mentioned above.  The left side will intermittently drain/bleed some.  Recently they noticed that the color has gone from red to purple/black.  Patient does not have sensation her feet.  Recently as well they have noted erythema on the left leg and some streaking on the right.    In the emergency department, exam revealed purple to black coloration on toes listed above.  CRP was elevated.  Patient had a white blood cell count.  Initially she was tachycardic and triggered sepsis protocol.  Patient was due to get dialysis yesterday but did not receive it secondary to the holiday.  Patient's blood sugar was 549 on CMP.  Creatinine was 3.18 with a EGFR of 15.8 which is slightly above baseline for her.  X-ray of both feet did not reveal any  osteomyelitis.  Patient was admitted for sepsis and gangrene of 2 toes.      CONCURRENT MEDICAL HISTORY:  Past Medical History:   Diagnosis Date   • Acute blood loss anemia 4/16/2017    Likely due to gastric oozing at this time. - Dr. Duarte (GI) was consulted and has now signed off, will follow up outpatient - pill colonoscopy showed AVMs - continue to monitor   • Acute cystitis with hematuria 3/31/2021    1/13: IV Rocephin 1 gm q 24 1/14 : transitioned  to omnicef 300mg. Urine cultures resulted and did not show growth. Omnicef discontinued as patient is asymptomatic   • Altered mental status 1/9/2022    - AMS on presentation - initial ABG pH 7.3, CO2 34 - Procal 0.29 - UA negative for acute cysitits -CTA head wnl  - Empiric Zosyn and Vancomycin -Lactate 2.5 on admission  - blood cultures no growth at 24 hours     • Anxiety    • CAD (coronary artery disease) 4/24/2021    S/P 3 stents 5/1/2021 for [facility] Continue ASA 81mg & Clopidogrel 75mg Continue Atorvastatin 40mg   • Carotid artery stenosis    • Chronic obstructive lung disease (HCC)    • CKD (chronic kidney disease) stage 4, GFR 15-29 ml/min ([facility])    • CKD (chronic kidney disease), symptom management only, stage 5 ([facility]) 10/5/2020    Results from last 7 days Lab Units 12/15/21 0548 12/14/21 1323 12/14/21 0916 CREATININE mg/dL 3.92* 3.21* 3.32*  Baseline creatinine 2-3 GFR 13-25 GFR 15 Dialysis MWF, sees Dr. Lauren Nephrology consult,, appreciate recommendations Continue Bumex 1mg bid daily Holding Bumex 2mg 4 times a week   • Colonic polyp    • Coronary arteriosclerosis    • Diabetes mellitus (HCC)    • Diabetic neuropathy (HCC)    • Ear pain, right 10/18/2021    - canal trauma due to patient scratching and DMT2 - added cortisporin ear drops   • Elevated troponin 10/12/2021    -most likely from CKD -Trending down -Neg chest pain   • Generalized abdominal pain 7/1/2022    Could be due to initiation of tube feeds vs dyspepsia vs abdominal cramps related to no PO  intake due to intubation vs constipation Continue current laxative regiment  If no bowel movement by this afternoon will consider enema   • GERD (gastroesophageal reflux disease)    • GI bleed 5/13/2021    - GI will follow up outpatient - Protonix 40mg daily - Avoid medical DVT prophy and use mechanical at this time instead. - Continue to monitor - pill colonoscopy results showed AVMs   • History of transfusion    • Hypercholesterolemia    • Hyperosmolar hyperglycemic state (HHS) ([facility]) 6/25/2022    Serum glucose 605 on admission  Anion gap 16 PH 7.37 Bicarb 27.9 Continue fluids  Insulin drip with [facility] protocol  Anion gap closed around 10 AM, received one dose of Levamir subq, will stop insulin drip after 2 hrs     • Hypertension    • Hypokalemia 5/27/2022    Will replace as needed. Will be cautious in the setting of ESRD to avoid need for emergency dialysis   Monitor Qtc intervals on EKG     • Hypomagnesemia 6/27/2021    Monitor and replace   • Morbid obesity ([facility])    • Nephrolithiasis    • On mechanically assisted ventilation ([facility]) 6/26/2022    Vent management and sedation orders placed.  - [facility] intensivist group consulted for vent management appreciate recommendations  - plan to extubate today     • Peripheral vascular disease ([facility])    • Pleural effusion on right 6/26/2022    CXR on 6/30/22 read as a small upper left pulmonary edema vs early pneumonia.  Last Echo 1/2022 EF 61-65 % Continue to monitor  Procal slightly improved, CRP improved On Linezolid and meropenum      • SIRS (systemic inflammatory response syndrome) ([facility]) 1/9/2022    Admission  - WBC 17.78   -   - RR 16 - 1/10: VSS/wnl - CXR - Mild pulm edema - Blood cultures no growth at 24 hours  - Procalcitonin 0.29 - UA : glucose 1000, negative Leucocytes/nitrate - Empiric Zosyn and Vancomcyin    • Sleep apnea    • Substance abuse ([facility])    • Vitamin D deficiency        PAST SURGICAL HISTORY:  Past Surgical History:   Procedure Laterality Date    • CARDIAC CATHETERIZATION N/A 7/14/2020   • CARDIAC CATHETERIZATION N/A 4/23/2021    Procedure: Left Heart Cath;  Surgeon: Melba Romo MD;  Location: [facility] CATH INVASIVE LOCATION;  Service: Cardiology;  Laterality: N/A;   • CARDIAC CATHETERIZATION N/A 4/30/2021    Procedure: Percutaneous Coronary Intervention;  Surgeon: Russell Voss MD;  Location: [facility] CATH INVASIVE LOCATION;  Service: Cardiovascular;  Laterality: N/A;   • CARDIAC CATHETERIZATION N/A 4/30/2021    Procedure: Stent NIKKI coronary;  Surgeon: Russell Voss MD;  Location: [facility] CATH INVASIVE LOCATION;  Service: Cardiovascular;  Laterality: N/A;   • CARDIAC CATHETERIZATION Left 11/13/2021    Procedure: Left Heart Cath;  Surgeon: Niall Rios MD;  Location: [facility] CATH INVASIVE LOCATION;  Service: Cardiology;  Laterality: Left;   • CAROTID STENT Left    • COLONOSCOPY     • COLONOSCOPY N/A 5/14/2021    Procedure: COLONOSCOPY;  Surgeon: Mingo Duarte MD;  Location: [facility] ENDOSCOPY;  Service: Gastroenterology;  Laterality: N/A;   • CORONARY ARTERY BYPASS GRAFT N/A 2013    CABG X 3   • CYSTOSCOPY BLADDER STONE LITHOTRIPSY Bilateral    • ENDOSCOPY N/A 4/12/2021    Procedure: ESOPHAGOGASTRODUODENOSCOPY;  Surgeon: Mingo Duarte MD;  Location: [facility] ENDOSCOPY;  Service: Gastroenterology;  Laterality: N/A;   • ENDOSCOPY N/A 5/14/2021    Procedure: ESOPHAGOGASTRODUODENOSCOPY;  Surgeon: Mingo Duarte MD;  Location: [facility] ENDOSCOPY;  Service: Gastroenterology;  Laterality: N/A;   • ENDOSCOPY N/A 1/28/2022    Procedure: ESOPHAGOGASTRODUODENOSCOPY;  Surgeon: Mingo Duarte MD;  Location: [facility] ENDOSCOPY;  Service: Gastroenterology;  Laterality: N/A;   • INTERVENTIONAL RADIOLOGY PROCEDURE N/A 10/21/2021    Procedure: tunneled central venous catheter placement;  Surgeon: Donnie Robles MD;  Location: [facility] ANGIO INVASIVE LOCATION;  Service: Interventional Radiology;  Laterality: N/A;   • INTERVENTIONAL  RADIOLOGY PROCEDURE N/A 2022    Procedure: tunneled central venous catheter placement;  Surgeon: Donnie Robles MD;  Location: [facility] ANGIO INVASIVE LOCATION;  Service: Interventional Radiology;  Laterality: N/A;       FAMILY HISTORY:  Family History   Problem Relation Age of Onset   • Heart disease Mother    • Lung cancer Mother    • Heart disease Father    • Heart attack Father    • Diabetes Father    • Heart disease Half-Sister         Dad's side   • Heart disease Brother    • No Known Problems Sister    • No Known Problems Sister    • No Known Problems Sister    • No Known Problems Sister    • No Known Problems Sister    • Pancreatic cancer Half-Sister         Dad's side   • No Known Problems Brother    • No Known Problems Brother    • Heart attack Half-Brother    • Heart attack Half-Brother    • No Known Problems Maternal Grandmother    • No Known Problems Maternal Grandfather    • No Known Problems Paternal Grandmother    • No Known Problems Paternal Grandfather         SOCIAL HISTORY:  Social History     Socioeconomic History   • Marital status:      Spouse name: wesley dumont   Tobacco Use   • Smoking status: Former Smoker     Packs/day: 0.25     Years: 46.00     Pack years: 11.50     Types: Cigarettes     Start date: 1999     Quit date: 2/15/2022     Years since quittin.5   • Smokeless tobacco: Never Used   • Tobacco comment: only smoking 5 a day - quit 2021   Substance and Sexual Activity   • Alcohol use: No   • Drug use: Not Currently     Types: LSD, Marijuana, Methamphetamines   • Sexual activity: Not Currently       HOME MEDICATIONS:  Prior to Admission medications    Medication Sig Start Date End Date Taking? Authorizing Provider   acetaminophen (Tylenol 8 Hour) 650 MG 8 hr tablet Take 1 tablet by mouth Every 8 (Eight) Hours As Needed for Mild Pain . 22   Blake Burris MD   albuterol (PROVENTIL) (2.5 MG/3ML) 0.083% nebulizer solution Inhale the contents of  "1 vial by nebulization Every 4 (Four) Hours As Needed for Wheezing. 10/28/21   Haily Mcneil MD   albuterol sulfate  (90 Base) MCG/ACT inhaler Inhale 2 puffs Every 4 (Four) Hours As Needed for Wheezing. 3/26/21   Nirmal Calvillo MD   aspirin (aspirin) 81 MG EC tablet Take 1 tablet by mouth Daily. 5/1/21   Jr Jaime Estrada MD   atorvastatin (LIPITOR) 20 MG tablet Take 1 tablet by mouth every night at bedtime. 4/26/22   Kit Brar MD   Blood Glucose Monitoring Suppl ([facility] Blood Glucose Meter) w/Device kit 1 each 3 (Three) Times a Day. 10/9/20   Nirmal Calvillo MD   bumetanide (BUMEX) 2 MG tablet Take 1 tablet by mouth 4 (Four) Times a Week. Take on non-hemodialysis days. 3/17/22   Alejandra Altamirano MD   Capsaicin 0.035 % cream Apply 3 g topically 3 (Three) Times a Day As Needed (ankle pain). 4/26/22   Kit Brar MD   Cetirizine HCl 10 MG capsule Take 1 capsule by mouth Daily. 11/4/21   Caio Thompson MD   clopidogrel (PLAVIX) 75 MG tablet Take 1 tablet by mouth Daily. 3/26/21   Nirmal Calvillo MD   collagenase (Santyl) 250 UNIT/GM ointment Apply 1 application topically to the appropriate area as directed Daily. 9/2/22   Rianna Macias MD   Diclofenac Sodium (VOLTAREN) 1 % gel gel Apply 4 g topically to the appropriate area as directed 4 (Four) Times a Day As Needed (Ankle pain). 4/26/22   Kit Brar MD   DULoxetine (CYMBALTA) 20 MG capsule Take 2 capsules by mouth Daily for 90 days. 4/26/22 7/25/22  Kit Brar MD   Easy Touch Insulin Syringe 30G X 5/16\" 0.5 ML misc USE AS DIRECTED WITH LEVEMIR 12/1/21   Caio Thompson MD   EASY TOUCH PEN NEEDLES 31G X 8 MM misc  8/7/20   Caio Thompson MD   gabapentin (NEURONTIN) 300 MG capsule TAKE 1 CAPSULE BY MOUTH DAILY. AND AN EXTRA PILL MONDAY/WEDNESDAY/FRIDAY - DIALYSIS DAYS 8/22/22   Blake Burris MD   glucose blood test strip Use as instructed 10/9/20   Nirmal Calvillo " MD [place]   insulin detemir (LEVEMIR) 100 UNIT/ML injection Inject 25 Units under the skin into the appropriate area as directed Every Night. 4/19/22   Eduin Griffin MD   Insulin Lispro, 1 Unit Dial, (HUMALOG) 100 UNIT/ML solution pen-injector Inject 12 Units under the skin into the appropriate area as directed 3 (Three) Times a Day With Meals. 5/24/22   Blake Burris MD   Insulin Pen Needle (NovoFine) 30G X 8 MM misc As directed 4 times daily 3/26/21   Nirmal Calvillo MD   Insulin Pen Needle 31G X 8 MM misc Use to inject insulin 4 (Four) Times a Day as directed. 10/28/21   Haily Mcneil MD   ipratropium-albuterol (DUO-NEB) 0.5-2.5 mg/3 ml nebulizer Take 3 mL by nebulization 4 (Four) Times a Day. 3/22/17   Caio Thompson MD   isosorbide mononitrate (IMDUR) 30 MG 24 hr tablet Take 1 tablet by mouth Every Morning. 3/3/22   Umesh Liz MD   levothyroxine (SYNTHROID, LEVOTHROID) 25 MCG tablet Take 25 mcg by mouth Daily. 9/1/21   Yadira Delarosa MD   lisinopril (PRINIVIL,ZESTRIL) 20 MG tablet Take 1 tablet by mouth Daily. 4/26/22   Kit Brar MD   Methoxy PEG-Epoetin Beta (MIRCERA IJ) 150 mcg Every 14 (Fourteen) Days. 3/2/22 3/1/23  Caio Thompson MD   Methoxy PEG-Epoetin Beta (MIRCERA IJ) 225 mcg Every 14 (Fourteen) Days. 3/14/22 3/13/23  Caio Thompson MD   metoprolol tartrate (LOPRESSOR) 25 MG tablet Take 1 tablet by mouth Every 12 (Twelve) Hours. 4/26/22   Kit Brar MD   montelukast (SINGULAIR) 10 MG tablet Take 1 tablet by mouth Every Night. 4/26/22   Kit Brar MD   muscle rub (BenGay) 10-15 % cream cream Apply 1 application topically to the appropriate area as directed 4 (Four) Times a Day As Needed for buttock pain. 1/19/22   Paulina Solano MD   nitroglycerin (NITROSTAT) 0.4 MG SL tablet Place 0.4 mg under the tongue Every 5 (Five) Minutes As Needed for Chest Pain (x 3 doses). 1/1/15   Provider, MD Caio   O2 (OXYGEN) Inhale  3 L/min Continuous. 1/1/21   Caio Thompson MD   ondansetron ODT (Zofran ODT) 4 MG disintegrating tablet Place 1 tablet on the tongue Every 8 (Eight) Hours As Needed for Nausea or Vomiting. 3/26/21   Nirmal Calvillo MD   oxybutynin XL (DITROPAN-XL) 5 MG 24 hr tablet Take 1 tablet by mouth Daily. 4/26/22   Kit Brar MD   pantoprazole (PROTONIX) 40 MG EC tablet Take 1 tablet by mouth Every Night. 4/26/22   Kit Brar MD   promethazine (PHENERGAN) 25 MG suppository Insert  into the rectum Every 6 (Six) Hours. 11/4/21   Caio Thompson MD   ranolazine (RANEXA) 500 MG 12 hr tablet Take 1 tablet by mouth Every 12 (Twelve) Hours. 4/26/22   Kit Brar MD   Semaglutide (Rybelsus) 7 MG tablet Take 7 mg by mouth Daily. 5/19/22   Kit Brar MD   sevelamer (RENVELA) 800 MG tablet Take 800 mg by mouth 3 (Three) Times a Day With Meals. 1/26/22   Caio Thompson MD   sodium bicarbonate 650 MG tablet Take 1 tablet by mouth. 11/4/21   Caio Thompson MD   insulin aspart (novoLOG FLEXPEN) 100 UNIT/ML solution pen-injector sc pen Inject 30 Units under the skin into the appropriate area as directed 3 (Three) Times a Day With Meals. 2/1/22 3/17/22  Blake Burris MD       ALLERGIES:  Adhesive tape, Nsaids, Latex, and Other    REVIEW OF SYSTEMS  Review of Systems   Constitutional: Negative for chills, fatigue and fever.   HENT: Negative for congestion, postnasal drip and rhinorrhea.    Eyes: Negative for pain and visual disturbance.   Respiratory: Positive for shortness of breath. Negative for cough.    Cardiovascular: Negative for chest pain, palpitations and leg swelling.   Gastrointestinal: Negative for abdominal pain, diarrhea, nausea and vomiting.   Genitourinary: Negative for difficulty urinating, dysuria and hematuria.   Musculoskeletal: Negative for arthralgias and back pain.   Skin: Positive for color change, rash and wound.   Neurological: Negative  for dizziness, syncope, weakness, light-headedness and headaches.       PHYSICAL EXAM:  Temp:  [99 °F (37.2 °C)] 99 °F (37.2 °C)  Heart Rate:  [] 86  Resp:  [18-22] 18  BP: (147-193)/(66-79) 147/66  Body mass index is 46.46 kg/m².  Physical Exam  Vitals reviewed.   Constitutional:       General: She is not in acute distress.     Appearance: She is well-developed. She is obese. She is not diaphoretic.   HENT:      Head: Normocephalic and atraumatic.      Mouth/Throat:      Pharynx: No oropharyngeal exudate.   Eyes:      General: No scleral icterus.     Conjunctiva/sclera: Conjunctivae normal.      Pupils: Pupils are equal, round, and reactive to light.   Cardiovascular:      Rate and Rhythm: Normal rate and regular rhythm.      Heart sounds: No murmur heard.  Pulmonary:      Effort: Pulmonary effort is normal. No respiratory distress.      Breath sounds: Normal breath sounds.   Abdominal:      General: Bowel sounds are normal. There is no distension.      Palpations: Abdomen is soft.      Tenderness: There is no abdominal tenderness. Negative signs include Frazier's sign.   Musculoskeletal:         General: Swelling present. No tenderness, deformity or signs of injury.   Skin:     General: Skin is warm and dry.             Comments: Pictures attached below   Neurological:      Mental Status: She is alert and oriented to person, place, and time.   Psychiatric:         Behavior: Behavior normal.     right foot        Left foot    Left leg      DIAGNOSTIC DATA:   Lab Results (last 24 hours)     Procedure Component Value Units Date/Time    [place] Draw [[id number]] Collected: 09/06/22 2058    Specimen: Blood Updated: 09/06/22 2337    Narrative:      The following orders were created for panel order [facility] Draw.  Procedure                               Abnormality         Status                     ---------                               -----------         ------                     Green Top (Gel)[[id number]]                                   Final result               Lavender Top[[id number]]                                     Final result               Gold Top - SST[[id number]]                                                              Light Blue Top[[id number]]                                   Final result                 Please view results for these tests on the individual orders.    Gold Top - SST [[id number]] Collected: 09/06/22 2337    Specimen: Blood Updated: 09/06/22 2337    Green Top (Gel) [[id number]] Collected: 09/06/22 2058    Specimen: Blood Updated: 09/06/22 2202     Extra Tube Hold for add-ons.     Comment: Auto resulted.       Lavender Top [[id number]] Collected: 09/06/22 2058    Specimen: Blood Updated: 09/06/22 2202     Extra Tube hold for add-on     Comment: Auto resulted       Light Blue Top [[id number]] Collected: 09/06/22 2058    Specimen: Blood Updated: 09/06/22 2202     Extra Tube Hold for add-ons.     Comment: Auto resulted       Comprehensive Metabolic Panel [[id number]]  (Abnormal) Collected: 09/06/22 2058    Specimen: Blood Updated: 09/06/22 2127     Glucose 549 mg/dL      BUN 43 mg/dL      Creatinine 3.18 mg/dL      Sodium 129 mmol/L      Potassium 3.7 mmol/L      Chloride 89 mmol/L      CO2 28.0 mmol/L      Calcium 9.1 mg/dL      Total Protein 8.1 g/dL      Albumin 3.10 g/dL      ALT (SGPT) 5 U/L      AST (SGOT) 9 U/L      Alkaline Phosphatase 194 U/L      Total Bilirubin 0.4 mg/dL      Globulin 5.0 gm/dL      A/G Ratio 0.6 g/dL      BUN/Creatinine Ratio 13.5     Anion Gap 12.0 mmol/L      eGFR 15.8 mL/min/1.73      Comment: National Kidney Foundation and American Society of Nephrology (ASN) Task Force recommended calculation based on the Chronic Kidney Disease Epidemiology Collaboration (CKD-EPI) equation refit without adjustment for race.       Narrative:      GFR Normal >60  Chronic Kidney Disease <60  Kidney Failure <15      C-reactive Protein [[id number]]  (Abnormal) Collected: 09/06/22 2058     Specimen: Blood Updated: 09/06/22 2124     C-Reactive Protein 32.67 mg/dL     Lactic Acid, Plasma [[id number]]  (Normal) Collected: 09/06/22 2058    Specimen: Blood Updated: 09/06/22 2120     Lactate 1.6 mmol/L     Sedimentation Rate [[id number]]  (Abnormal) Collected: 09/06/22 2058    Specimen: Blood Updated: 09/06/22 2110     Sed Rate 114 mm/hr     CBC & Differential [[id number]]  (Abnormal) Collected: 09/06/22 2058    Specimen: Blood Updated: 09/06/22 2106    Narrative:      The following orders were created for panel order CBC & Differential.  Procedure                               Abnormality         Status                     ---------                               -----------         ------                     CBC Auto Differential[[id number]]        Abnormal            Final result                 Please view results for these tests on the individual orders.    CBC Auto Differential [[id number]]  (Abnormal) Collected: 09/06/22 2058    Specimen: Blood Updated: 09/06/22 2106     WBC 14.21 10*3/mm3      RBC 3.91 10*6/mm3      Hemoglobin 10.4 g/dL      Hematocrit 34.0 %      MCV 87.0 fL      MCH 26.6 pg      MCHC 30.6 g/dL      RDW 16.4 %      RDW-SD 51.9 fl      MPV 8.8 fL      Platelets 224 10*3/mm3      Neutrophil % 84.5 %      Lymphocyte % 7.2 %      Monocyte % 6.3 %      Eosinophil % 0.4 %      Basophil % 0.6 %      Immature Grans % 1.0 %      Neutrophils, Absolute 12.01 10*3/mm3      Lymphocytes, Absolute 1.02 10*3/mm3      Monocytes, Absolute 0.90 10*3/mm3      Eosinophils, Absolute 0.05 10*3/mm3      Basophils, Absolute 0.09 10*3/mm3      Immature Grans, Absolute 0.14 10*3/mm3      nRBC 0.0 /100 WBC     Blood Culture - Blood, Arm, Left [[id number]] Collected: 09/06/22 2058    Specimen: Blood from Arm, Left Updated: 09/06/22 2102           Imaging Results (Last 24 Hours)     Procedure Component Value Units Date/Time    XR Foot 3+ View Bilateral [[id number]] Collected: 09/06/22 2019     Updated: 09/06/22 2107     Narrative:      Bilateral foot x-ray three views on 9/6/2022    CLINICAL INDICATION: Wounds, sepsis, question osteomyelitis    COMPARISON: Left foot x-ray from 7/11/2021    FINDINGS:    Left foot: Vascular calcifications are noted. Moderate size  plantar calcaneal spur is noted. There are no fractures.  Visualized joints are well aligned. No definite plain  radiographic evidence of osteomyelitis is noted.    Right foot: Vascular calcifications are noted. Moderate size  plantar calcaneal spur is noted. There are no fractures.  Visualized joints are well aligned. No definite plain  radiographic evidence of osteomyelitis is noted.      Impression:      No definite plain radiographic evidence of osteomyelitis in  either foot. If there is high clinical concern consider MRI.    Electronically signed by:  Eusebio Sargent  9/6/2022 9:05 PM CDT  Workstation: [id number]          I reviewed the patient's new clinical results.    ASSESSMENT AND PLAN: This is a 62 y.o. female with:      GERD (gastroesophageal reflux disease)    Hypertension    Hypothyroidism    MIKE and COPD overlap syndrome ([facility])    Cellulitis of left leg    Type 2 diabetes mellitus with autonomic neuropathy ([facility])    Physical deconditioning    ESRD on hemodialysis ([facility])    Anemia    Sepsis without acute organ dysfunction, due to unspecified organism ([facility])    Gangrene of both feet ([facility])    #Gangrene of both feet: Images attached above.  Patient with leukocytosis and elevated CRP.  Known vasculopath and poorly controlled diabetic.  Patient with a complex infectious disease history with known MRSA infection and previous CRE infection as well.  Due to location of wounds, concurrent medical history, I will to cover for MRSA Pseudomonas and anaerobic coverage.  Ordered vancomycin and Zosyn.  Vancomycin will appropriately cover for MRSA and Zosyn will cover for pseudomonal and anaerobic infections.  Patient is previously been on these medications.  There is some concern  about further renal damage of this combination of medications however she is already on dialysis and already has end-stage renal disease.  Nephrology will be consulted in the morning for dialysis and to give any input on potentially change his antibiotics.  I will discuss with the day team about consulting podiatry as well.  Patient likely not to be responsive to revascularization and amputation would likely be necessary at some point this patient.  Pharmacy was consulted to dose Vanco and Zosyn.  Repeat CRP in the morning.  Daily CBC to assess leukocytosis.  MRI was ordered this evening however will unlikely be done tonight, follow-up when done.    #Sepsis: Patient still has elevated leukocytosis however not febrile, heart rate has normalized and respiration rate normalized.  Slightly above baseline oxygen requirements.  We will start antibiotics.  Holding IV fluids at this time secondary to end-stage renal disease.  Continue to monitor.  Lactate was normal at 1.6.    #Cellulitis: Images attached above.  Asked wound to be marked by nursing.  Above antibiotic coverage will likely cover for any possible cellulitis.  Very low concern for any necrotizing infection at this time.  Continue to monitor progression via visual exam and response to lab work.    #ESRD on dialysis: Patient is on dialysis 3 times a week.  Last dialysis was 9/1/2022.  Creatinine and GFR slightly above baseline.  Will consult nephrology in the morning to set up dialysis for tomorrow.  At current presentation, risks of not appropriately treating sepsis/gangrene/cellulitis outweighs potential nephrotoxic nature of antibiotics listed above.  Avoid other nephrotoxic agents.    #MIKE/COPD: Nasal cannula oxygen to maintain oxygen saturation 28 to 92%.  Patient may use home trilogy machine.    #Hypothyroidism: Continue home levothyroxine dosage.    #Diabetes: Patient reports home regimen of 40 units long-acting insulin twice a day and 40 units short  acting insulin prandially.  Patient started on cardiac/consistent carb/renal diet which is vastly different from her home dosage.  Slight decrease in insulin at this time.  Current regimen will be Levemir 30 units twice daily and 30 units prandial with high-dose sliding scale insulin.    #Anemia: Chronic and stable.  Continue to monitor    #Hypertension: Restart home medications    DVT prophylaxis:   Mechanical Order History:     None      Pharmalogical Order History:     None         Code status is   Code Status and Medical Interventions:   Ordered at: 09/06/22 2310     Level Of Support Discussed With:    Patient     Code Status (Patient has no pulse and is not breathing):    CPR (Attempt to Resuscitate)     Medical Interventions (Patient has pulse or is breathing):    Full Support        Yamileth Slater and I have discussed pain goals for this hospitalization after reviewing her current clinical condition, medical history and prior pain experiences.  The goal is to keep her  pain level tolerable.  To help achieve this, I plan to use PRN medications.    JULIAN # PDMP, reviewed and consistent with patient reported medications.      I discussed the patient's findings and my recommendations with patient, family, nursing staff and primary care team.     Dr. Brar is the attending on record at time of admission, he is aware of the patient's status and agrees with the above history and physical.       Jerman Coffman MD   PGY-3    [facility]  [address] [place] [address]  Office: [phone]    This document has been electronically signed by Jerman Coffman MD on September 6, 2022 23:39 CDT

## 2022-09-07 NOTE — PROGRESS NOTES
"Pharmacokinetics by Pharmacy - Vancomycin Initial Consult    Yamileth Slater is a 62 y.o. female being initiated on vancomycin for Gangrene of both feet. Patient is also receiving zosyn.    Objective:     [Ht: 157.5 cm (62\"); Wt: 115 kg (254 lb)]     WBC   Date Value Ref Range Status   09/07/2022 12.47 (H) 3.40 - 10.80 10*3/mm3 Final   09/06/2022 14.21 (H) 3.40 - 10.80 10*3/mm3 Final      C-Reactive Protein   Date Value Ref Range Status   09/07/2022 34.10 (H) 0.00 - 0.50 mg/dL Final   09/06/2022 32.67 (H) 0.00 - 0.50 mg/dL Final     Lactate   Date Value Ref Range Status   09/06/2022 1.6 0.5 - 2.0 mmol/L Final      Temp Readings from Last 1 Encounters:   09/07/22 97 °F (36.1 °C) (Oral)     Estimated Creatinine Clearance: 21.7 mL/min (A) (by C-G formula based on SCr of 3.19 mg/dL (H)).   Creatinine   Date Value Ref Range Status   09/07/2022 3.19 (H) 0.57 - 1.00 mg/dL Final   09/06/2022 3.18 (H) 0.57 - 1.00 mg/dL Final       Baseline culture results:  Microbiology Results (last 10 days)     ** No results found for the last 240 hours. **        No results found for: RESPCX    Assessment    Random Level: 18.00 mcg/ml at 1325  Blood culture 9/6: Pending     Afebrile  WBC elevated at 12.47, but trending down      Initial vancomycin 1750 mg IV given 9/6 @ 2252   Patient is currently on MWF dialysis. Patient is scheduled to have dialysis late this afternoon        Plan  1. Give vancomycin 1000 mg IV after dialysis today at 2000  2. Will order vancomycin random levels for every MWF @ 0600  3. Pharmacy will monitor renal function and adjust dose accordingly.    Loly Fang [facility]  09/07/22 14:46 CDT   "

## 2022-09-07 NOTE — ED PROVIDER NOTES
Subjective   62-year-old female presents to the emergency department with concern for bilateral lower extremity wounds.  She has diabetes as well as peripheral vascular disease and has had these wounds for a number of weeks.  Reportedly home health has been dressing them.  She does not follow with wound care.  She has diabetic neuropathy but reports increased pain.  Wounds are draining.  No recent antibiotics.  She denies any systemic symptoms of fever, chills, nausea, vomiting or diarrhea.    Family history, surgical history, social history, current medications and allergies are reviewed with the patient and triage documentation and vitals are reviewed.      History provided by:  Patient, spouse and medical records   used: No        Review of Systems   Constitutional: Negative for chills and fever.   HENT: Negative for congestion and sore throat.    Eyes: Negative for photophobia and visual disturbance.   Respiratory: Negative for cough and shortness of breath.    Cardiovascular: Negative for chest pain, palpitations and leg swelling.   Gastrointestinal: Negative for abdominal pain, diarrhea, nausea and vomiting.   Endocrine: Negative for polydipsia, polyphagia and polyuria.   Genitourinary: Negative for dysuria, frequency and urgency.   Musculoskeletal: Negative for arthralgias, back pain, myalgias and neck pain.   Skin: Positive for color change and wound.   Allergic/Immunologic: Negative.    Neurological: Negative for weakness, light-headedness and numbness.   Hematological: Negative.    Psychiatric/Behavioral: Negative.        Past Medical History:   Diagnosis Date   • Acute blood loss anemia 4/16/2017    Likely due to gastric oozing at this time. - Dr. Duarte (GI) was consulted and has now signed off, will follow up outpatient - pill colonoscopy showed AVMs - continue to monitor   • Acute cystitis with hematuria 3/31/2021    1/13: IV Rocephin 1 gm q 24 1/14 : transitioned  to omnicef 300mg.  Urine cultures resulted and did not show growth. Omnicef discontinued as patient is asymptomatic   • Altered mental status 1/9/2022    - AMS on presentation - initial ABG pH 7.3, CO2 34 - Procal 0.29 - UA negative for acute cysitits -CTA head wnl  - Empiric Zosyn and Vancomycin -Lactate 2.5 on admission  - blood cultures no growth at 24 hours     • Anxiety    • CAD (coronary artery disease) 4/24/2021    S/P 3 stents 5/1/2021 for [facility] Continue ASA 81mg & Clopidogrel 75mg Continue Atorvastatin 40mg   • Carotid artery stenosis    • Chronic obstructive lung disease (HCC)    • CKD (chronic kidney disease) stage 4, GFR 15-29 ml/min (HCC)    • CKD (chronic kidney disease), symptom management only, stage 5 (HCC) 10/5/2020    Results from last 7 days Lab Units 12/15/21 0548 12/14/21 1323 12/14/21 0916 CREATININE mg/dL 3.92* 3.21* 3.32*  Baseline creatinine 2-3 GFR 13-25 GFR 15 Dialysis MWF, sees Dr. Lauren Nephrology consult,, appreciate recommendations Continue Bumex 1mg bid daily Holding Bumex 2mg 4 times a week   • Colonic polyp    • Coronary arteriosclerosis    • Diabetes mellitus (HCC)    • Diabetic neuropathy (HCC)    • Ear pain, right 10/18/2021    - canal trauma due to patient scratching and DMT2 - added cortisporin ear drops   • Elevated troponin 10/12/2021    -most likely from CKD -Trending down -Neg chest pain   • Generalized abdominal pain 7/1/2022    Could be due to initiation of tube feeds vs dyspepsia vs abdominal cramps related to no PO intake due to intubation vs constipation Continue current laxative regiment  If no bowel movement by this afternoon will consider enema   • GERD (gastroesophageal reflux disease)    • GI bleed 5/13/2021    - GI will follow up outpatient - Protonix 40mg daily - Avoid medical DVT prophy and use mechanical at this time instead. - Continue to monitor - pill colonoscopy results showed AVMs   • History of transfusion    • Hypercholesterolemia    • Hyperosmolar hyperglycemic state  ([facility]) ([facility]) 6/25/2022    Serum glucose 605 on admission  Anion gap 16 PH 7.37 Bicarb 27.9 Continue fluids  Insulin drip with [facility] protocol  Anion gap closed around 10 AM, received one dose of Levamir subq, will stop insulin drip after 2 hrs     • Hypertension    • Hypokalemia 5/27/2022    Will replace as needed. Will be cautious in the setting of ESRD to avoid need for emergency dialysis   Monitor Qtc intervals on EKG     • Hypomagnesemia 6/27/2021    Monitor and replace   • Morbid obesity ([facility])    • Nephrolithiasis    • On mechanically assisted ventilation ([facility]) 6/26/2022    Vent management and sedation orders placed.  - [facility] intensivist group consulted for vent management appreciate recommendations  - plan to extubate today     • Peripheral vascular disease ([facility])    • Pleural effusion on right 6/26/2022    CXR on 6/30/22 read as a small upper left pulmonary edema vs early pneumonia.  Last Echo 1/2022 EF 61-65 % Continue to monitor  Procal slightly improved, CRP improved On Linezolid and meropenum      • SIRS (systemic inflammatory response syndrome) ([facility]) 1/9/2022    Admission  - WBC 17.78   -   - RR 16 - 1/10: VSS/wnl - CXR - Mild pulm edema - Blood cultures no growth at 24 hours  - Procalcitonin 0.29 - UA : glucose 1000, negative Leucocytes/nitrate - Empiric Zosyn and Vancomcyin    • Sleep apnea    • Substance abuse ([facility])    • Vitamin D deficiency        Allergies   Allergen Reactions   • Adhesive Tape Hives, Other (See Comments) and Rash   • Nsaids Hives   • Latex Other (See Comments)   • Other Itching     Bandaids, MRSA, SURECLOSE       Past Surgical History:   Procedure Laterality Date   • CARDIAC CATHETERIZATION N/A 7/14/2020   • CARDIAC CATHETERIZATION N/A 4/23/2021    Procedure: Left Heart Cath;  Surgeon: Melba Romo MD;  Location: [facility] CATH INVASIVE LOCATION;  Service: Cardiology;  Laterality: N/A;   • CARDIAC CATHETERIZATION N/A 4/30/2021    Procedure: Percutaneous Coronary  Intervention;  Surgeon: Russell Voss MD;  Location: [facility] CATH INVASIVE LOCATION;  Service: Cardiovascular;  Laterality: N/A;   • CARDIAC CATHETERIZATION N/A 4/30/2021    Procedure: Stent NIKKI coronary;  Surgeon: Russell Voss MD;  Location: [facility] CATH INVASIVE LOCATION;  Service: Cardiovascular;  Laterality: N/A;   • CARDIAC CATHETERIZATION Left 11/13/2021    Procedure: Left Heart Cath;  Surgeon: Niall Rios MD;  Location: [facility] CATH INVASIVE LOCATION;  Service: Cardiology;  Laterality: Left;   • CAROTID STENT Left    • COLONOSCOPY     • COLONOSCOPY N/A 5/14/2021    Procedure: COLONOSCOPY;  Surgeon: Mingo Duarte MD;  Location: [facility] ENDOSCOPY;  Service: Gastroenterology;  Laterality: N/A;   • CORONARY ARTERY BYPASS GRAFT N/A 2013    CABG X 3   • CYSTOSCOPY BLADDER STONE LITHOTRIPSY Bilateral    • ENDOSCOPY N/A 4/12/2021    Procedure: ESOPHAGOGASTRODUODENOSCOPY;  Surgeon: Mingo Duarte MD;  Location: [facility] ENDOSCOPY;  Service: Gastroenterology;  Laterality: N/A;   • ENDOSCOPY N/A 5/14/2021    Procedure: ESOPHAGOGASTRODUODENOSCOPY;  Surgeon: Mingo Duarte MD;  Location: [facility] ENDOSCOPY;  Service: Gastroenterology;  Laterality: N/A;   • ENDOSCOPY N/A 1/28/2022    Procedure: ESOPHAGOGASTRODUODENOSCOPY;  Surgeon: Mingo Duarte MD;  Location: [facility] ENDOSCOPY;  Service: Gastroenterology;  Laterality: N/A;   • INTERVENTIONAL RADIOLOGY PROCEDURE N/A 10/21/2021    Procedure: tunneled central venous catheter placement;  Surgeon: Donnie Robles MD;  Location: [facility] ANGIO INVASIVE LOCATION;  Service: Interventional Radiology;  Laterality: N/A;   • INTERVENTIONAL RADIOLOGY PROCEDURE N/A 1/27/2022    Procedure: tunneled central venous catheter placement;  Surgeon: Donnie Robles MD;  Location: [facility] ANGIO INVASIVE LOCATION;  Service: Interventional Radiology;  Laterality: N/A;       Family History   Problem Relation Age of Onset   • Heart disease Mother    •  Lung cancer Mother    • Heart disease Father    • Heart attack Father    • Diabetes Father    • Heart disease Half-Sister         Dad's side   • Heart disease Brother    • No Known Problems Sister    • No Known Problems Sister    • No Known Problems Sister    • No Known Problems Sister    • No Known Problems Sister    • Pancreatic cancer Half-Sister         Dad's side   • No Known Problems Brother    • No Known Problems Brother    • Heart attack Half-Brother    • Heart attack Half-Brother    • No Known Problems Maternal Grandmother    • No Known Problems Maternal Grandfather    • No Known Problems Paternal Grandmother    • No Known Problems Paternal Grandfather        Social History     Socioeconomic History   • Marital status:      Spouse name: wesley dumont   Tobacco Use   • Smoking status: Former Smoker     Packs/day: 0.25     Years: 46.00     Pack years: 11.50     Types: Cigarettes     Start date: 1999     Quit date: 2/15/2022     Years since quittin.5   • Smokeless tobacco: Never Used   • Tobacco comment: only smoking 5 a day - quit 2021   Substance and Sexual Activity   • Alcohol use: No   • Drug use: Not Currently     Types: LSD, Marijuana, Methamphetamines   • Sexual activity: Not Currently           Objective   Physical Exam  Vitals and nursing note reviewed.   Constitutional:       General: She is not in acute distress.     Appearance: Normal appearance. She is morbidly obese. She is not ill-appearing, toxic-appearing or diaphoretic.   HENT:      Head: Normocephalic.   Eyes:      Conjunctiva/sclera: Conjunctivae normal.      Pupils: Pupils are equal, round, and reactive to light.   Cardiovascular:      Rate and Rhythm: Normal rate and regular rhythm.      Heart sounds: No murmur heard.  Pulmonary:      Effort: Pulmonary effort is normal.      Breath sounds: Normal breath sounds.   Abdominal:      General: Bowel sounds are normal.      Palpations: Abdomen is soft.   Musculoskeletal:          General: Normal range of motion.      Cervical back: Normal range of motion and neck supple.        Legs:         Feet:    Skin:     General: Skin is warm and dry.      Capillary Refill: Capillary refill takes 2 to 3 seconds.      Findings: Erythema present.   Neurological:      General: No focal deficit present.      Mental Status: She is alert and oriented to person, place, and time.         Procedures  none         ED Course      Labs Reviewed   COMPREHENSIVE METABOLIC PANEL - Abnormal; Notable for the following components:       Result Value    Glucose 549 (*)     BUN 43 (*)     Creatinine 3.18 (*)     Sodium 129 (*)     Chloride 89 (*)     Albumin 3.10 (*)     Alkaline Phosphatase 194 (*)     eGFR 15.8 (*)     All other components within normal limits    Narrative:     GFR Normal >60  Chronic Kidney Disease <60  Kidney Failure <15     CBC WITH AUTO DIFFERENTIAL - Abnormal; Notable for the following components:    WBC 14.21 (*)     Hemoglobin 10.4 (*)     MCHC 30.6 (*)     RDW 16.4 (*)     Neutrophil % 84.5 (*)     Lymphocyte % 7.2 (*)     Immature Grans % 1.0 (*)     Neutrophils, Absolute 12.01 (*)     Immature Grans, Absolute 0.14 (*)     All other components within normal limits   C-REACTIVE PROTEIN - Abnormal; Notable for the following components:    C-Reactive Protein 32.67 (*)     All other components within normal limits   SEDIMENTATION RATE - Abnormal; Notable for the following components:    Sed Rate 114 (*)     All other components within normal limits   LACTIC ACID, PLASMA - Normal   BLOOD CULTURE   BLOOD CULTURE   RAINBOW DRAW    Narrative:     The following orders were created for panel order [place] Draw.  Procedure                               Abnormality         Status                     ---------                               -----------         ------                     Green Top (Gel)[[id number]]                                  Final result               Lavender Top[[id number]]                                      Final result               Gold Top - SST[[id number]]                                                              Light Blue Top[[id number]]                                   Final result                 Please view results for these tests on the individual orders.   CBC AND DIFFERENTIAL    Narrative:     The following orders were created for panel order CBC & Differential.  Procedure                               Abnormality         Status                     ---------                               -----------         ------                     CBC Auto Differential[[id number]]        Abnormal            Final result                 Please view results for these tests on the individual orders.   GREEN TOP   LAVENDER TOP   LIGHT BLUE TOP   GOLD TOP - SST     XR Foot 3+ View Bilateral    Result Date: 9/6/2022  Narrative: Bilateral foot x-ray three views on 9/6/2022 CLINICAL INDICATION: Wounds, sepsis, question osteomyelitis COMPARISON: Left foot x-ray from 7/11/2021 FINDINGS: Left foot: Vascular calcifications are noted. Moderate size plantar calcaneal spur is noted. There are no fractures. Visualized joints are well aligned. No definite plain radiographic evidence of osteomyelitis is noted. Right foot: Vascular calcifications are noted. Moderate size plantar calcaneal spur is noted. There are no fractures. Visualized joints are well aligned. No definite plain radiographic evidence of osteomyelitis is noted.     Impression: No definite plain radiographic evidence of osteomyelitis in either foot. If there is high clinical concern consider MRI. Electronically signed by:  Eusebio Sargent  9/6/2022 9:05 PM CDT Workstation: [id number]        [facility]  Number of Diagnoses or Management Options     Amount and/or Complexity of Data Reviewed  Clinical lab tests: reviewed  Tests in the radiology section of CPT®: reviewed    Patient Progress  Patient progress: stable    Patient found to have simple sepsis  related to multiple diabetic foot wounds.  One toe on each foot with necrosis.  Concern for osteomyelitis.  X-ray does not reveal any obvious osteomyelitis.  Started on broad-spectrum coverage antibiotics.  Spoke with the resident on-call for admission.    Final diagnoses:   Sepsis without acute organ dysfunction, due to unspecified organism (HCC)   Diabetic foot ulcer associated with type 2 diabetes mellitus, unspecified laterality, unspecified part of foot, unspecified ulcer stage (HCC)   Hyperglycemia       ED Disposition  ED Disposition     ED Disposition   Decision to Admit    Condition   --    Comment   Level of Care: Med/Surg [1]   Diagnosis: Sepsis without acute organ dysfunction, due to unspecified organism (HCC) [[id number]]   Admitting Physician: TARIQ CONDON [[id number]]               No follow-up provider specified.       Medication List      No changes were made to your prescriptions during this visit.          Davy Ramires,   09/06/22 0087

## 2022-09-07 NOTE — PAYOR COMM NOTE
"Psychiatric  Case Managment Extender   Emilie Adams  (P) [phone]  (F) [phone]        REF# [id number]      Bryon Yamileth Sylvester (62 y.o. Female)             Date of Birth   1960    Social Security Number       Address   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), 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)   Code Status: CPR   Advance Care Planning Activity    Ht: 157.5 cm (62\")   Wt: 115 kg (254 lb)    Admission Cmt: None   Principal Problem: Gangrene of both feet (HCC) [I96]                 Active Insurance as of 9/6/2022     Primary Coverage     Payor Plan Insurance Group Employer/Plan Group    ANTHEM MEDICARE REPLACEMENT ANTHEM MEDICARE ADVANTAGE [id number]     Payor Plan Address Payor Plan Phone Number Payor Plan Fax Number Effective Dates    [address] [phone]  1/1/2022 - None Entered    [facility] [id number]       Subscriber Name Subscriber Birth Date Member ID       YAMILETH FINK ARMAAN 1960 [id number]           Secondary Coverage     Payor Plan Insurance Group Employer/Plan Group    [place]      Payor Plan Address Payor Plan Phone Number Payor Plan Fax Number Effective Dates    [address] [phone]  6/28/2019 - None Entered    [facility] [id number]       Subscriber Name Subscriber Birth Date Member ID       YAMILETH FINK ARMAAN 1960 " [id number]                 Emergency Contacts      (Rel.) Home Phone Work Phone Mobile Phone    Huy Slater (Spouse) [phone] -- [phone]    Yamileth Chavez (Daughter) [phone] -- [phone]    Suzanne Rivera (Daughter) [phone] -- [phone]               History & Physical      Jerman Coffman MD at 22 0082              HISTORY AND PHYSICAL  NAME: Yamileth Slater  : 1960  MRN: [id number]    DATE OF ADMISSION: 22    DATE & TIME SEEN: 22 23:39 CDT    PCP: Rianna Macias MD    CODE STATUS:   Code Status and Medical Interventions:   Ordered at: 22 9630     Level Of Support Discussed With:    Patient     Code Status (Patient has no pulse and is not breathing):    CPR (Attempt to Resuscitate)     Medical Interventions (Patient has pulse or is breathing):    Full Support       CHIEF COMPLAINT: Toe pain/discoloration    HPI:  Yamileth Slater is a 62 y.o. female with a PMH of ESRD on dialysis MWF, diabetes mellitus, morbid obesity, CAD s/p stents, hyperlipidemia, MIKE/COPD, hypothyroidism   who presents with worsening toe wounds.  Patient initially noticed wound on left second toe and right great toe approximately 3 weeks ago.  Home health has been intermittently coming and helping with topical medications.  Patient has not been on any antibiotics for these infections.  Over the same time patient's blood sugars been difficult to control.  Most recently over the last couple of weeks its been ranging between 400 and 500.  Wounds are on the ventral aspect of the toes mentioned above.  The left side will intermittently drain/bleed some.  Recently they noticed that the color has gone from red to purple/black.  Patient does not have sensation her feet.  Recently as well they have noted erythema on the left leg and some streaking on the right.    In the emergency department, exam revealed purple to black coloration on toes listed above.  CRP was elevated.   Patient had a white blood cell count.  Initially she was tachycardic and triggered sepsis protocol.  Patient was due to get dialysis yesterday but did not receive it secondary to the holiday.  Patient's blood sugar was 549 on CMP.  Creatinine was 3.18 with a EGFR of 15.8 which is slightly above baseline for her.  X-ray of both feet did not reveal any osteomyelitis.  Patient was admitted for sepsis and gangrene of 2 toes.      CONCURRENT MEDICAL HISTORY:  Past Medical History:   Diagnosis Date   • Acute blood loss anemia 4/16/2017    Likely due to gastric oozing at this time. - Dr. Duarte (GI) was consulted and has now signed off, will follow up outpatient - pill colonoscopy showed AVMs - continue to monitor   • Acute cystitis with hematuria 3/31/2021    1/13: IV Rocephin 1 gm q 24 1/14 : transitioned  to omnicef 300mg. Urine cultures resulted and did not show growth. Omnicef discontinued as patient is asymptomatic   • Altered mental status 1/9/2022    - AMS on presentation - initial ABG pH 7.3, CO2 34 - Procal 0.29 - UA negative for acute cysitits -CTA head wnl  - Empiric Zosyn and Vancomycin -Lactate 2.5 on admission  - blood cultures no growth at 24 hours     • Anxiety    • CAD (coronary artery disease) 4/24/2021    S/P 3 stents 5/1/2021 for [facility] Continue ASA 81mg & Clopidogrel 75mg Continue Atorvastatin 40mg   • Carotid artery stenosis    • Chronic obstructive lung disease (HCC)    • CKD (chronic kidney disease) stage 4, GFR 15-29 ml/min ([facility])    • CKD (chronic kidney disease), symptom management only, stage 5 ([facility]) 10/5/2020    Results from last 7 days Lab Units 12/15/21 0548 12/14/21 1323 12/14/21 0916 CREATININE mg/dL 3.92* 3.21* 3.32*  Baseline creatinine 2-3 GFR 13-25 GFR 15 Dialysis MWF, sees Dr. Lauren Nephrology consult,, appreciate recommendations Continue Bumex 1mg bid daily Holding Bumex 2mg 4 times a week   • Colonic polyp    • Coronary arteriosclerosis    • Diabetes mellitus (HCC)    • Diabetic  neuropathy ([facility])    • Ear pain, right 10/18/2021    - canal trauma due to patient scratching and DMT2 - added cortisporin ear drops   • Elevated troponin 10/12/2021    -most likely from CKD -Trending down -Neg chest pain   • Generalized abdominal pain 7/1/2022    Could be due to initiation of tube feeds vs dyspepsia vs abdominal cramps related to no PO intake due to intubation vs constipation Continue current laxative regiment  If no bowel movement by this afternoon will consider enema   • GERD (gastroesophageal reflux disease)    • GI bleed 5/13/2021    - GI will follow up outpatient - Protonix 40mg daily - Avoid medical DVT prophy and use mechanical at this time instead. - Continue to monitor - pill colonoscopy results showed AVMs   • History of transfusion    • Hypercholesterolemia    • Hyperosmolar hyperglycemic state (HHS) ([facility]) 6/25/2022    Serum glucose 605 on admission  Anion gap 16 PH 7.37 Bicarb 27.9 Continue fluids  Insulin drip with [facility] protocol  Anion gap closed around 10 AM, received one dose of Levamir subq, will stop insulin drip after 2 hrs     • Hypertension    • Hypokalemia 5/27/2022    Will replace as needed. Will be cautious in the setting of ESRD to avoid need for emergency dialysis   Monitor Qtc intervals on EKG     • Hypomagnesemia 6/27/2021    Monitor and replace   • Morbid obesity ([facility])    • Nephrolithiasis    • On mechanically assisted ventilation ([facility]) 6/26/2022    Vent management and sedation orders placed.  - [facility] intensivist group consulted for vent management appreciate recommendations  - plan to extubate today     • Peripheral vascular disease ([facility])    • Pleural effusion on right 6/26/2022    CXR on 6/30/22 read as a small upper left pulmonary edema vs early pneumonia.  Last Echo 1/2022 EF 61-65 % Continue to monitor  Procal slightly improved, CRP improved On Linezolid and meropenum      • SIRS (systemic inflammatory response syndrome) ([facility]) 1/9/2022    Admission  - WBC  17.78   -   - RR 16 - 1/10: VSS/wnl - CXR - Mild pulm edema - Blood cultures no growth at 24 hours  - Procalcitonin 0.29 - UA : glucose 1000, negative Leucocytes/nitrate - Empiric Zosyn and Vancomcyin    • Sleep apnea    • Substance abuse (HCC)    • Vitamin D deficiency        PAST SURGICAL HISTORY:  Past Surgical History:   Procedure Laterality Date   • CARDIAC CATHETERIZATION N/A 7/14/2020   • CARDIAC CATHETERIZATION N/A 4/23/2021    Procedure: Left Heart Cath;  Surgeon: Melba Romo MD;  Location: [facility] CATH INVASIVE LOCATION;  Service: Cardiology;  Laterality: N/A;   • CARDIAC CATHETERIZATION N/A 4/30/2021    Procedure: Percutaneous Coronary Intervention;  Surgeon: Russell Voss MD;  Location: [facility] CATH INVASIVE LOCATION;  Service: Cardiovascular;  Laterality: N/A;   • CARDIAC CATHETERIZATION N/A 4/30/2021    Procedure: Stent NIKKI coronary;  Surgeon: Russell Voss MD;  Location: [facility] CATH INVASIVE LOCATION;  Service: Cardiovascular;  Laterality: N/A;   • CARDIAC CATHETERIZATION Left 11/13/2021    Procedure: Left Heart Cath;  Surgeon: Niall Rios MD;  Location: [facility] CATH INVASIVE LOCATION;  Service: Cardiology;  Laterality: Left;   • CAROTID STENT Left    • COLONOSCOPY     • COLONOSCOPY N/A 5/14/2021    Procedure: COLONOSCOPY;  Surgeon: Mingo Duarte MD;  Location: [facility] ENDOSCOPY;  Service: Gastroenterology;  Laterality: N/A;   • CORONARY ARTERY BYPASS GRAFT N/A 2013    CABG X 3   • CYSTOSCOPY BLADDER STONE LITHOTRIPSY Bilateral    • ENDOSCOPY N/A 4/12/2021    Procedure: ESOPHAGOGASTRODUODENOSCOPY;  Surgeon: Mingo Duarte MD;  Location: [facility] ENDOSCOPY;  Service: Gastroenterology;  Laterality: N/A;   • ENDOSCOPY N/A 5/14/2021    Procedure: ESOPHAGOGASTRODUODENOSCOPY;  Surgeon: Mingo Duarte MD;  Location: [facility] ENDOSCOPY;  Service: Gastroenterology;  Laterality: N/A;   • ENDOSCOPY N/A 1/28/2022    Procedure: ESOPHAGOGASTRODUODENOSCOPY;  Surgeon:  Mingo Duarte MD;  Location: [facility] ENDOSCOPY;  Service: Gastroenterology;  Laterality: N/A;   • INTERVENTIONAL RADIOLOGY PROCEDURE N/A 10/21/2021    Procedure: tunneled central venous catheter placement;  Surgeon: Donnie Robles MD;  Location: [facility] ANGIO INVASIVE LOCATION;  Service: Interventional Radiology;  Laterality: N/A;   • INTERVENTIONAL RADIOLOGY PROCEDURE N/A 2022    Procedure: tunneled central venous catheter placement;  Surgeon: Donnie Robles MD;  Location: [facility] ANGIO INVASIVE LOCATION;  Service: Interventional Radiology;  Laterality: N/A;       FAMILY HISTORY:  Family History   Problem Relation Age of Onset   • Heart disease Mother    • Lung cancer Mother    • Heart disease Father    • Heart attack Father    • Diabetes Father    • Heart disease Half-Sister         Dad's side   • Heart disease Brother    • No Known Problems Sister    • No Known Problems Sister    • No Known Problems Sister    • No Known Problems Sister    • No Known Problems Sister    • Pancreatic cancer Half-Sister         Dad's side   • No Known Problems Brother    • No Known Problems Brother    • Heart attack Half-Brother    • Heart attack Half-Brother    • No Known Problems Maternal Grandmother    • No Known Problems Maternal Grandfather    • No Known Problems Paternal Grandmother    • No Known Problems Paternal Grandfather         SOCIAL HISTORY:  Social History     Socioeconomic History   • Marital status:      Spouse name: wesley dumont   Tobacco Use   • Smoking status: Former Smoker     Packs/day: 0.25     Years: 46.00     Pack years: 11.50     Types: Cigarettes     Start date: 1999     Quit date: 2/15/2022     Years since quittin.5   • Smokeless tobacco: Never Used   • Tobacco comment: only smoking 5 a day - quit 2021   Substance and Sexual Activity   • Alcohol use: No   • Drug use: Not Currently     Types: LSD, Marijuana, Methamphetamines   • Sexual activity: Not Currently  "      HOME MEDICATIONS:  Prior to Admission medications    Medication Sig Start Date End Date Taking? Authorizing Provider   acetaminophen (Tylenol 8 Hour) 650 MG 8 hr tablet Take 1 tablet by mouth Every 8 (Eight) Hours As Needed for Mild Pain . 2/1/22   Blake Burris MD   albuterol (PROVENTIL) (2.5 MG/3ML) 0.083% nebulizer solution Inhale the contents of 1 vial by nebulization Every 4 (Four) Hours As Needed for Wheezing. 10/28/21   Haily Mcneil MD   albuterol sulfate  (90 Base) MCG/ACT inhaler Inhale 2 puffs Every 4 (Four) Hours As Needed for Wheezing. 3/26/21   Nirmal Calvillo MD   aspirin (aspirin) 81 MG EC tablet Take 1 tablet by mouth Daily. 5/1/21   Jr Jaime Estrada MD   atorvastatin (LIPITOR) 20 MG tablet Take 1 tablet by mouth every night at bedtime. 4/26/22   Kit Brar MD   Blood Glucose Monitoring Suppl ([employer] Blood Glucose Meter) w/Device kit 1 each 3 (Three) Times a Day. 10/9/20   Nirmal Calvillo MD   bumetanide (BUMEX) 2 MG tablet Take 1 tablet by mouth 4 (Four) Times a Week. Take on non-hemodialysis days. 3/17/22   Alejandra Altamirano MD   Capsaicin 0.035 % cream Apply 3 g topically 3 (Three) Times a Day As Needed (ankle pain). 4/26/22   Kit Brar MD   Cetirizine HCl 10 MG capsule Take 1 capsule by mouth Daily. 11/4/21   Caio Thompson MD   clopidogrel (PLAVIX) 75 MG tablet Take 1 tablet by mouth Daily. 3/26/21   Nirmal Calvillo MD   collagenase (Santyl) 250 UNIT/GM ointment Apply 1 application topically to the appropriate area as directed Daily. 9/2/22   Rianna Macias MD   Diclofenac Sodium (VOLTAREN) 1 % gel gel Apply 4 g topically to the appropriate area as directed 4 (Four) Times a Day As Needed (Ankle pain). 4/26/22   Kit Brar MD   DULoxetine (CYMBALTA) 20 MG capsule Take 2 capsules by mouth Daily for 90 days. 4/26/22 7/25/22  Kit Brar MD   Easy Touch Insulin Syringe 30G X 5/16\" 0.5 ML misc USE AS " DIRECTED WITH LEVEMIR 12/1/21   Caio Thompson MD   EASY TOUCH PEN NEEDLES 31G X 8 MM misc  8/7/20   Caio Thompson MD   gabapentin (NEURONTIN) 300 MG capsule TAKE 1 CAPSULE BY MOUTH DAILY. AND AN EXTRA PILL MONDAY/WEDNESDAY/FRIDAY - DIALYSIS DAYS 8/22/22   Blake Burris MD   glucose blood test strip Use as instructed 10/9/20   Nirmal Calvillo MD   insulin detemir (LEVEMIR) 100 UNIT/ML injection Inject 25 Units under the skin into the appropriate area as directed Every Night. 4/19/22   Eduin Griffin MD   Insulin Lispro, 1 Unit Dial, (HUMALOG) 100 UNIT/ML solution pen-injector Inject 12 Units under the skin into the appropriate area as directed 3 (Three) Times a Day With Meals. 5/24/22   Blake Burris MD   Insulin Pen Needle (NovoFine) 30G X 8 MM misc As directed 4 times daily 3/26/21   Nirmal Calvillo MD   Insulin Pen Needle 31G X 8 MM misc Use to inject insulin 4 (Four) Times a Day as directed. 10/28/21   Haily Mcneil MD   ipratropium-albuterol (DUO-NEB) 0.5-2.5 mg/3 ml nebulizer Take 3 mL by nebulization 4 (Four) Times a Day. 3/22/17   Caio Thompson MD   isosorbide mononitrate (IMDUR) 30 MG 24 hr tablet Take 1 tablet by mouth Every Morning. 3/3/22   Umesh Liz MD   levothyroxine (SYNTHROID, LEVOTHROID) 25 MCG tablet Take 25 mcg by mouth Daily. 9/1/21   Yadira Delarosa MD   lisinopril (PRINIVIL,ZESTRIL) 20 MG tablet Take 1 tablet by mouth Daily. 4/26/22   Kit Brar MD   Methoxy PEG-Epoetin Beta (MIRCERA IJ) 150 mcg Every 14 (Fourteen) Days. 3/2/22 3/1/23  Caio Thompson MD   Methoxy PEG-Epoetin Beta (MIRCERA IJ) 225 mcg Every 14 (Fourteen) Days. 3/14/22 3/13/23  Caio Thompson MD   metoprolol tartrate (LOPRESSOR) 25 MG tablet Take 1 tablet by mouth Every 12 (Twelve) Hours. 4/26/22   Kit Brar MD   montelukast (SINGULAIR) 10 MG tablet Take 1 tablet by mouth Every Night. 4/26/22   Kit Brar MD   muscle rub  (BenGay) 10-15 % cream cream Apply 1 application topically to the appropriate area as directed 4 (Four) Times a Day As Needed for buttock pain. 1/19/22   Paulina Solano MD   nitroglycerin (NITROSTAT) 0.4 MG SL tablet Place 0.4 mg under the tongue Every 5 (Five) Minutes As Needed for Chest Pain (x 3 doses). 1/1/15   Caio Thompson MD   O2 (OXYGEN) Inhale 3 L/min Continuous. 1/1/21   Caio Thompson MD   ondansetron ODT (Zofran ODT) 4 MG disintegrating tablet Place 1 tablet on the tongue Every 8 (Eight) Hours As Needed for Nausea or Vomiting. 3/26/21   Nirmal Calvillo MD   oxybutynin XL (DITROPAN-XL) 5 MG 24 hr tablet Take 1 tablet by mouth Daily. 4/26/22   Kit Brar MD   pantoprazole (PROTONIX) 40 MG EC tablet Take 1 tablet by mouth Every Night. 4/26/22   Kit Brar MD   promethazine (PHENERGAN) 25 MG suppository Insert  into the rectum Every 6 (Six) Hours. 11/4/21   Caio Thompson MD   ranolazine (RANEXA) 500 MG 12 hr tablet Take 1 tablet by mouth Every 12 (Twelve) Hours. 4/26/22   Kit Brar MD   Semaglutide (Rybelsus) 7 MG tablet Take 7 mg by mouth Daily. 5/19/22   Kit Brar MD   sevelamer (RENVELA) 800 MG tablet Take 800 mg by mouth 3 (Three) Times a Day With Meals. 1/26/22   Caio Thompson MD   sodium bicarbonate 650 MG tablet Take 1 tablet by mouth. 11/4/21   Caio Thompson MD   insulin aspart (novoLOG FLEXPEN) 100 UNIT/ML solution pen-injector sc pen Inject 30 Units under the skin into the appropriate area as directed 3 (Three) Times a Day With Meals. 2/1/22 3/17/22  Blake Burris MD       ALLERGIES:  Adhesive tape, Nsaids, Latex, and Other    REVIEW OF SYSTEMS  Review of Systems   Constitutional: Negative for chills, fatigue and fever.   HENT: Negative for congestion, postnasal drip and rhinorrhea.    Eyes: Negative for pain and visual disturbance.   Respiratory: Positive for shortness of breath. Negative for  cough.    Cardiovascular: Negative for chest pain, palpitations and leg swelling.   Gastrointestinal: Negative for abdominal pain, diarrhea, nausea and vomiting.   Genitourinary: Negative for difficulty urinating, dysuria and hematuria.   Musculoskeletal: Negative for arthralgias and back pain.   Skin: Positive for color change, rash and wound.   Neurological: Negative for dizziness, syncope, weakness, light-headedness and headaches.       PHYSICAL EXAM:  Temp:  [99 °F (37.2 °C)] 99 °F (37.2 °C)  Heart Rate:  [] 86  Resp:  [18-22] 18  BP: (147-193)/(66-79) 147/66  Body mass index is 46.46 kg/m².  Physical Exam  Vitals reviewed.   Constitutional:       General: She is not in acute distress.     Appearance: She is well-developed. She is obese. She is not diaphoretic.   HENT:      Head: Normocephalic and atraumatic.      Mouth/Throat:      Pharynx: No oropharyngeal exudate.   Eyes:      General: No scleral icterus.     Conjunctiva/sclera: Conjunctivae normal.      Pupils: Pupils are equal, round, and reactive to light.   Cardiovascular:      Rate and Rhythm: Normal rate and regular rhythm.      Heart sounds: No murmur heard.  Pulmonary:      Effort: Pulmonary effort is normal. No respiratory distress.      Breath sounds: Normal breath sounds.   Abdominal:      General: Bowel sounds are normal. There is no distension.      Palpations: Abdomen is soft.      Tenderness: There is no abdominal tenderness. Negative signs include Frazier's sign.   Musculoskeletal:         General: Swelling present. No tenderness, deformity or signs of injury.   Skin:     General: Skin is warm and dry.             Comments: Pictures attached below   Neurological:      Mental Status: She is alert and oriented to person, place, and time.   Psychiatric:         Behavior: Behavior normal.     right foot        Left foot    Left leg      DIAGNOSTIC DATA:   Lab Results (last 24 hours)     Procedure Component Value Units Date/Time    [place]  Draw [[id number]] Collected: 09/06/22 2058    Specimen: Blood Updated: 09/06/22 2337    Narrative:      The following orders were created for panel order [place] Draw.  Procedure                               Abnormality         Status                     ---------                               -----------         ------                     Green Top (Gel)[[id number]]                                  Final result               Lavender Top[[id number]]                                     Final result               Gold Top - SST[[id number]]                                                              Light Blue Top[[id number]]                                   Final result                 Please view results for these tests on the individual orders.    Gold Top - SST [[id number]] Collected: 09/06/22 2337    Specimen: Blood Updated: 09/06/22 2337    Green Top (Gel) [[id number]] Collected: 09/06/22 2058    Specimen: Blood Updated: 09/06/22 2202     Extra Tube Hold for add-ons.     Comment: Auto resulted.       Lavender Top [[id number]] Collected: 09/06/22 2058    Specimen: Blood Updated: 09/06/22 2202     Extra Tube hold for add-on     Comment: Auto resulted       Light Blue Top [[id number]] Collected: 09/06/22 2058    Specimen: Blood Updated: 09/06/22 2202     Extra Tube Hold for add-ons.     Comment: Auto resulted       Comprehensive Metabolic Panel [[id number]]  (Abnormal) Collected: 09/06/22 2058    Specimen: Blood Updated: 09/06/22 2127     Glucose 549 mg/dL      BUN 43 mg/dL      Creatinine 3.18 mg/dL      Sodium 129 mmol/L      Potassium 3.7 mmol/L      Chloride 89 mmol/L      CO2 28.0 mmol/L      Calcium 9.1 mg/dL      Total Protein 8.1 g/dL      Albumin 3.10 g/dL      ALT (SGPT) 5 U/L      AST (SGOT) 9 U/L      Alkaline Phosphatase 194 U/L      Total Bilirubin 0.4 mg/dL      Globulin 5.0 gm/dL      A/G Ratio 0.6 g/dL      BUN/Creatinine Ratio 13.5     Anion Gap 12.0 mmol/L      eGFR 15.8 mL/min/1.73      Comment:  National Kidney Foundation and American Society of Nephrology (ASN) Task Force recommended calculation based on the Chronic Kidney Disease Epidemiology Collaboration (CKD-EPI) equation refit without adjustment for race.       Narrative:      GFR Normal >60  Chronic Kidney Disease <60  Kidney Failure <15      C-reactive Protein [[id number]]  (Abnormal) Collected: 09/06/22 2058    Specimen: Blood Updated: 09/06/22 2124     C-Reactive Protein 32.67 mg/dL     Lactic Acid, Plasma [[id number]]  (Normal) Collected: 09/06/22 2058    Specimen: Blood Updated: 09/06/22 2120     Lactate 1.6 mmol/L     Sedimentation Rate [[id number]]  (Abnormal) Collected: 09/06/22 2058    Specimen: Blood Updated: 09/06/22 2110     Sed Rate 114 mm/hr     CBC & Differential [[id number]]  (Abnormal) Collected: 09/06/22 2058    Specimen: Blood Updated: 09/06/22 2106    Narrative:      The following orders were created for panel order CBC & Differential.  Procedure                               Abnormality         Status                     ---------                               -----------         ------                     CBC Auto Differential[[id number]]        Abnormal            Final result                 Please view results for these tests on the individual orders.    CBC Auto Differential [[id number]]  (Abnormal) Collected: 09/06/22 2058    Specimen: Blood Updated: 09/06/22 2106     WBC 14.21 10*3/mm3      RBC 3.91 10*6/mm3      Hemoglobin 10.4 g/dL      Hematocrit 34.0 %      MCV 87.0 fL      MCH 26.6 pg      MCHC 30.6 g/dL      RDW 16.4 %      RDW-SD 51.9 fl      MPV 8.8 fL      Platelets 224 10*3/mm3      Neutrophil % 84.5 %      Lymphocyte % 7.2 %      Monocyte % 6.3 %      Eosinophil % 0.4 %      Basophil % 0.6 %      Immature Grans % 1.0 %      Neutrophils, Absolute 12.01 10*3/mm3      Lymphocytes, Absolute 1.02 10*3/mm3      Monocytes, Absolute 0.90 10*3/mm3      Eosinophils, Absolute 0.05 10*3/mm3      Basophils, Absolute 0.09 10*3/mm3       Immature Grans, Absolute 0.14 10*3/mm3      nRBC 0.0 /100 WBC     Blood Culture - Blood, Arm, Left [[id number]] Collected: 09/06/22 2058    Specimen: Blood from Arm, Left Updated: 09/06/22 2102           Imaging Results (Last 24 Hours)     Procedure Component Value Units Date/Time    XR Foot 3+ View Bilateral [[id number]] Collected: 09/06/22 2019     Updated: 09/06/22 2107    Narrative:      Bilateral foot x-ray three views on 9/6/2022    CLINICAL INDICATION: Wounds, sepsis, question osteomyelitis    COMPARISON: Left foot x-ray from 7/11/2021    FINDINGS:    Left foot: Vascular calcifications are noted. Moderate size  plantar calcaneal spur is noted. There are no fractures.  Visualized joints are well aligned. No definite plain  radiographic evidence of osteomyelitis is noted.    Right foot: Vascular calcifications are noted. Moderate size  plantar calcaneal spur is noted. There are no fractures.  Visualized joints are well aligned. No definite plain  radiographic evidence of osteomyelitis is noted.      Impression:      No definite plain radiographic evidence of osteomyelitis in  either foot. If there is high clinical concern consider MRI.    Electronically signed by:  Eusebio Sargent  9/6/2022 9:05 PM CDT  Workstation: [id number]          I reviewed the patient's new clinical results.    ASSESSMENT AND PLAN: This is a 62 y.o. female with:      GERD (gastroesophageal reflux disease)    Hypertension    Hypothyroidism    MIKE and COPD overlap syndrome ([facility])    Cellulitis of left leg    Type 2 diabetes mellitus with autonomic neuropathy ([facility])    Physical deconditioning    ESRD on hemodialysis ([facility])    Anemia    Sepsis without acute organ dysfunction, due to unspecified organism ([facility])    Gangrene of both feet ([facility])    #Gangrene of both feet: Images attached above.  Patient with leukocytosis and elevated CRP.  Known vasculopath and poorly controlled diabetic.  Patient with a complex infectious disease history with known  MRSA infection and previous CRE infection as well.  Due to location of wounds, concurrent medical history, I will to cover for MRSA Pseudomonas and anaerobic coverage.  Ordered vancomycin and Zosyn.  Vancomycin will appropriately cover for MRSA and Zosyn will cover for pseudomonal and anaerobic infections.  Patient is previously been on these medications.  There is some concern about further renal damage of this combination of medications however she is already on dialysis and already has end-stage renal disease.  Nephrology will be consulted in the morning for dialysis and to give any input on potentially change his antibiotics.  I will discuss with the day team about consulting podiatry as well.  Patient likely not to be responsive to revascularization and amputation would likely be necessary at some point this patient.  Pharmacy was consulted to dose Vanco and Zosyn.  Repeat CRP in the morning.  Daily CBC to assess leukocytosis.  MRI was ordered this evening however will unlikely be done tonight, follow-up when done.    #Sepsis: Patient still has elevated leukocytosis however not febrile, heart rate has normalized and respiration rate normalized.  Slightly above baseline oxygen requirements.  We will start antibiotics.  Holding IV fluids at this time secondary to end-stage renal disease.  Continue to monitor.  Lactate was normal at 1.6.    #Cellulitis: Images attached above.  Asked wound to be marked by nursing.  Above antibiotic coverage will likely cover for any possible cellulitis.  Very low concern for any necrotizing infection at this time.  Continue to monitor progression via visual exam and response to lab work.    #ESRD on dialysis: Patient is on dialysis 3 times a week.  Last dialysis was 9/1/2022.  Creatinine and GFR slightly above baseline.  Will consult nephrology in the morning to set up dialysis for tomorrow.  At current presentation, risks of not appropriately treating sepsis/gangrene/cellulitis  outweighs potential nephrotoxic nature of antibiotics listed above.  Avoid other nephrotoxic agents.    #MIKE/COPD: Nasal cannula oxygen to maintain oxygen saturation 28 to 92%.  Patient may use home trilogy machine.    #Hypothyroidism: Continue home levothyroxine dosage.    #Diabetes: Patient reports home regimen of 40 units long-acting insulin twice a day and 40 units short acting insulin prandially.  Patient started on cardiac/consistent carb/renal diet which is vastly different from her home dosage.  Slight decrease in insulin at this time.  Current regimen will be Levemir 30 units twice daily and 30 units prandial with high-dose sliding scale insulin.    #Anemia: Chronic and stable.  Continue to monitor    #Hypertension: Restart home medications    DVT prophylaxis:   Mechanical Order History:     None      Pharmalogical Order History:     None         Code status is   Code Status and Medical Interventions:   Ordered at: 09/06/22 6350     Level Of Support Discussed With:    Patient     Code Status (Patient has no pulse and is not breathing):    CPR (Attempt to Resuscitate)     Medical Interventions (Patient has pulse or is breathing):    Full Support        Yamileth Slater and I have discussed pain goals for this hospitalization after reviewing her current clinical condition, medical history and prior pain experiences.  The goal is to keep her  pain level tolerable.  To help achieve this, I plan to use PRN medications.    JULIAN # PDMP, reviewed and consistent with patient reported medications.      I discussed the patient's findings and my recommendations with patient, family, nursing staff and primary care team.     Dr. Brar is the attending on record at time of admission, he is aware of the patient's status and agrees with the above history and physical.       Jerman Coffman MD   PGY-3    [facility]  [address] [place] [id number]  Office: [phone]    This  document has been electronically signed by Jerman Coffman MD on September 6, 2022 23:39 CDT      Electronically signed by Jerman Coffman MD at 09/06/22 2342          Emergency Department Notes      Davy Ramires DO at 09/06/22 1950          Subjective   62-year-old female presents to the emergency department with concern for bilateral lower extremity wounds.  She has diabetes as well as peripheral vascular disease and has had these wounds for a number of weeks.  Reportedly home health has been dressing them.  She does not follow with wound care.  She has diabetic neuropathy but reports increased pain.  Wounds are draining.  No recent antibiotics.  She denies any systemic symptoms of fever, chills, nausea, vomiting or diarrhea.    Family history, surgical history, social history, current medications and allergies are reviewed with the patient and triage documentation and vitals are reviewed.      History provided by:  Patient, spouse and medical records   used: No        Review of Systems   Constitutional: Negative for chills and fever.   HENT: Negative for congestion and sore throat.    Eyes: Negative for photophobia and visual disturbance.   Respiratory: Negative for cough and shortness of breath.    Cardiovascular: Negative for chest pain, palpitations and leg swelling.   Gastrointestinal: Negative for abdominal pain, diarrhea, nausea and vomiting.   Endocrine: Negative for polydipsia, polyphagia and polyuria.   Genitourinary: Negative for dysuria, frequency and urgency.   Musculoskeletal: Negative for arthralgias, back pain, myalgias and neck pain.   Skin: Positive for color change and wound.   Allergic/Immunologic: Negative.    Neurological: Negative for weakness, light-headedness and numbness.   Hematological: Negative.    Psychiatric/Behavioral: Negative.        Past Medical History:   Diagnosis Date   • Acute blood loss anemia 4/16/2017    Likely due to gastric oozing at this  time. - Dr. Duarte (GI) was consulted and has now signed off, will follow up outpatient - pill colonoscopy showed AVMs - continue to monitor   • Acute cystitis with hematuria 3/31/2021    1/13: IV Rocephin 1 gm q 24 1/14 : transitioned  to omnicef 300mg. Urine cultures resulted and did not show growth. Omnicef discontinued as patient is asymptomatic   • Altered mental status 1/9/2022    - AMS on presentation - initial ABG pH 7.3, CO2 34 - Procal 0.29 - UA negative for acute cysitits -CTA head wnl  - Empiric Zosyn and Vancomycin -Lactate 2.5 on admission  - blood cultures no growth at 24 hours     • Anxiety    • CAD (coronary artery disease) 4/24/2021    S/P 3 stents 5/1/2021 for [facility] Continue ASA 81mg & Clopidogrel 75mg Continue Atorvastatin 40mg   • Carotid artery stenosis    • Chronic obstructive lung disease (HCC)    • CKD (chronic kidney disease) stage 4, GFR 15-29 ml/min ([facility])    • CKD (chronic kidney disease), symptom management only, stage 5 ([facility]) 10/5/2020    Results from last 7 days Lab Units 12/15/21 0548 12/14/21 1323 12/14/21 0916 CREATININE mg/dL 3.92* 3.21* 3.32*  Baseline creatinine 2-3 GFR 13-25 GFR 15 Dialysis MWF, sees Dr. Lauren Nephrology consult,, appreciate recommendations Continue Bumex 1mg bid daily Holding Bumex 2mg 4 times a week   • Colonic polyp    • Coronary arteriosclerosis    • Diabetes mellitus (HCC)    • Diabetic neuropathy (HCC)    • Ear pain, right 10/18/2021    - canal trauma due to patient scratching and DMT2 - added cortisporin ear drops   • Elevated troponin 10/12/2021    -most likely from CKD -Trending down -Neg chest pain   • Generalized abdominal pain 7/1/2022    Could be due to initiation of tube feeds vs dyspepsia vs abdominal cramps related to no PO intake due to intubation vs constipation Continue current laxative regiment  If no bowel movement by this afternoon will consider enema   • GERD (gastroesophageal reflux disease)    • GI bleed 5/13/2021    - GI will follow up  outpatient - Protonix 40mg daily - Avoid medical DVT prophy and use mechanical at this time instead. - Continue to monitor - pill colonoscopy results showed AVMs   • History of transfusion    • Hypercholesterolemia    • Hyperosmolar hyperglycemic state (HHS) ([facility]) 6/25/2022    Serum glucose 605 on admission  Anion gap 16 PH 7.37 Bicarb 27.9 Continue fluids  Insulin drip with [facility] protocol  Anion gap closed around 10 AM, received one dose of Levamir subq, will stop insulin drip after 2 hrs     • Hypertension    • Hypokalemia 5/27/2022    Will replace as needed. Will be cautious in the setting of ESRD to avoid need for emergency dialysis   Monitor Qtc intervals on EKG     • Hypomagnesemia 6/27/2021    Monitor and replace   • Morbid obesity ([facility])    • Nephrolithiasis    • On mechanically assisted ventilation ([facility]) 6/26/2022    Vent management and sedation orders placed.  - [facility] intensivist group consulted for vent management appreciate recommendations  - plan to extubate today     • Peripheral vascular disease ([facility])    • Pleural effusion on right 6/26/2022    CXR on 6/30/22 read as a small upper left pulmonary edema vs early pneumonia.  Last Echo 1/2022 EF 61-65 % Continue to monitor  Procal slightly improved, CRP improved On Linezolid and meropenum      • SIRS (systemic inflammatory response syndrome) ([facility]) 1/9/2022    Admission  - WBC 17.78   -   - RR 16 - 1/10: VSS/wnl - CXR - Mild pulm edema - Blood cultures no growth at 24 hours  - Procalcitonin 0.29 - UA : glucose 1000, negative Leucocytes/nitrate - Empiric Zosyn and Vancomcyin    • Sleep apnea    • Substance abuse ([facility])    • Vitamin D deficiency        Allergies   Allergen Reactions   • Adhesive Tape Hives, Other (See Comments) and Rash   • Nsaids Hives   • Latex Other (See Comments)   • Other Itching     Bandaids, MRSA, SURECLOSE       Past Surgical History:   Procedure Laterality Date   • CARDIAC CATHETERIZATION N/A 7/14/2020   • CARDIAC  CATHETERIZATION N/A 4/23/2021    Procedure: Left Heart Cath;  Surgeon: Melba Romo MD;  Location: [facility] CATH INVASIVE LOCATION;  Service: Cardiology;  Laterality: N/A;   • CARDIAC CATHETERIZATION N/A 4/30/2021    Procedure: Percutaneous Coronary Intervention;  Surgeon: Russell Vsos MD;  Location: [facility] CATH INVASIVE LOCATION;  Service: Cardiovascular;  Laterality: N/A;   • CARDIAC CATHETERIZATION N/A 4/30/2021    Procedure: Stent NIKKI coronary;  Surgeon: Russell Voss MD;  Location: [facility] CATH INVASIVE LOCATION;  Service: Cardiovascular;  Laterality: N/A;   • CARDIAC CATHETERIZATION Left 11/13/2021    Procedure: Left Heart Cath;  Surgeon: Niall Rios MD;  Location: [facility] CATH INVASIVE LOCATION;  Service: Cardiology;  Laterality: Left;   • CAROTID STENT Left    • COLONOSCOPY     • COLONOSCOPY N/A 5/14/2021    Procedure: COLONOSCOPY;  Surgeon: Mingo Duarte MD;  Location: [facility] ENDOSCOPY;  Service: Gastroenterology;  Laterality: N/A;   • CORONARY ARTERY BYPASS GRAFT N/A 2013    CABG X 3   • CYSTOSCOPY BLADDER STONE LITHOTRIPSY Bilateral    • ENDOSCOPY N/A 4/12/2021    Procedure: ESOPHAGOGASTRODUODENOSCOPY;  Surgeon: Mingo Duarte MD;  Location: [facility] ENDOSCOPY;  Service: Gastroenterology;  Laterality: N/A;   • ENDOSCOPY N/A 5/14/2021    Procedure: ESOPHAGOGASTRODUODENOSCOPY;  Surgeon: Mingo Duarte MD;  Location: [facility] ENDOSCOPY;  Service: Gastroenterology;  Laterality: N/A;   • ENDOSCOPY N/A 1/28/2022    Procedure: ESOPHAGOGASTRODUODENOSCOPY;  Surgeon: Mingo Duarte MD;  Location: [facility] ENDOSCOPY;  Service: Gastroenterology;  Laterality: N/A;   • INTERVENTIONAL RADIOLOGY PROCEDURE N/A 10/21/2021    Procedure: tunneled central venous catheter placement;  Surgeon: Donnie Robles MD;  Location: [facility] ANGIO INVASIVE LOCATION;  Service: Interventional Radiology;  Laterality: N/A;   • INTERVENTIONAL RADIOLOGY PROCEDURE N/A 1/27/2022    Procedure:  tunneled central venous catheter placement;  Surgeon: Donnie Robles MD;  Location: [facility] ANGIO INVASIVE LOCATION;  Service: Interventional Radiology;  Laterality: N/A;       Family History   Problem Relation Age of Onset   • Heart disease Mother    • Lung cancer Mother    • Heart disease Father    • Heart attack Father    • Diabetes Father    • Heart disease Half-Sister         Dad's side   • Heart disease Brother    • No Known Problems Sister    • No Known Problems Sister    • No Known Problems Sister    • No Known Problems Sister    • No Known Problems Sister    • Pancreatic cancer Half-Sister         Dad's side   • No Known Problems Brother    • No Known Problems Brother    • Heart attack Half-Brother    • Heart attack Half-Brother    • No Known Problems Maternal Grandmother    • No Known Problems Maternal Grandfather    • No Known Problems Paternal Grandmother    • No Known Problems Paternal Grandfather        Social History     Socioeconomic History   • Marital status:      Spouse name: wesley dumont   Tobacco Use   • Smoking status: Former Smoker     Packs/day: 0.25     Years: 46.00     Pack years: 11.50     Types: Cigarettes     Start date: 1999     Quit date: 2/15/2022     Years since quittin.5   • Smokeless tobacco: Never Used   • Tobacco comment: only smoking 5 a day - quit 2021   Substance and Sexual Activity   • Alcohol use: No   • Drug use: Not Currently     Types: LSD, Marijuana, Methamphetamines   • Sexual activity: Not Currently           Objective   Physical Exam  Vitals and nursing note reviewed.   Constitutional:       General: She is not in acute distress.     Appearance: Normal appearance. She is morbidly obese. She is not ill-appearing, toxic-appearing or diaphoretic.   HENT:      Head: Normocephalic.   Eyes:      Conjunctiva/sclera: Conjunctivae normal.      Pupils: Pupils are equal, round, and reactive to light.   Cardiovascular:      Rate and Rhythm: Normal  rate and regular rhythm.      Heart sounds: No murmur heard.  Pulmonary:      Effort: Pulmonary effort is normal.      Breath sounds: Normal breath sounds.   Abdominal:      General: Bowel sounds are normal.      Palpations: Abdomen is soft.   Musculoskeletal:         General: Normal range of motion.      Cervical back: Normal range of motion and neck supple.        Legs:         Feet:    Skin:     General: Skin is warm and dry.      Capillary Refill: Capillary refill takes 2 to 3 seconds.      Findings: Erythema present.   Neurological:      General: No focal deficit present.      Mental Status: She is alert and oriented to person, place, and time.         Procedures  none        ED Course      Labs Reviewed   COMPREHENSIVE METABOLIC PANEL - Abnormal; Notable for the following components:       Result Value    Glucose 549 (*)     BUN 43 (*)     Creatinine 3.18 (*)     Sodium 129 (*)     Chloride 89 (*)     Albumin 3.10 (*)     Alkaline Phosphatase 194 (*)     eGFR 15.8 (*)     All other components within normal limits    Narrative:     GFR Normal >60  Chronic Kidney Disease <60  Kidney Failure <15     CBC WITH AUTO DIFFERENTIAL - Abnormal; Notable for the following components:    WBC 14.21 (*)     Hemoglobin 10.4 (*)     MCHC 30.6 (*)     RDW 16.4 (*)     Neutrophil % 84.5 (*)     Lymphocyte % 7.2 (*)     Immature Grans % 1.0 (*)     Neutrophils, Absolute 12.01 (*)     Immature Grans, Absolute 0.14 (*)     All other components within normal limits   C-REACTIVE PROTEIN - Abnormal; Notable for the following components:    C-Reactive Protein 32.67 (*)     All other components within normal limits   SEDIMENTATION RATE - Abnormal; Notable for the following components:    Sed Rate 114 (*)     All other components within normal limits   LACTIC ACID, PLASMA - Normal   BLOOD CULTURE   BLOOD CULTURE   RAINBOW DRAW    Narrative:     The following orders were created for panel order [place] Draw.  Procedure                                Abnormality         Status                     ---------                               -----------         ------                     Green Top (Gel)[[id number]]                                  Final result               Lavender Top[[id number]]                                     Final result               Gold Top - SST[[id number]]                                                              Light Blue Top[[id number]]                                   Final result                 Please view results for these tests on the individual orders.   CBC AND DIFFERENTIAL    Narrative:     The following orders were created for panel order CBC & Differential.  Procedure                               Abnormality         Status                     ---------                               -----------         ------                     CBC Auto Differential[[id number]]        Abnormal            Final result                 Please view results for these tests on the individual orders.   GREEN TOP   LAVENDER TOP   LIGHT BLUE TOP   GOLD TOP - SST     XR Foot 3+ View Bilateral    Result Date: 9/6/2022  Narrative: Bilateral foot x-ray three views on 9/6/2022 CLINICAL INDICATION: Wounds, sepsis, question osteomyelitis COMPARISON: Left foot x-ray from 7/11/2021 FINDINGS: Left foot: Vascular calcifications are noted. Moderate size plantar calcaneal spur is noted. There are no fractures. Visualized joints are well aligned. No definite plain radiographic evidence of osteomyelitis is noted. Right foot: Vascular calcifications are noted. Moderate size plantar calcaneal spur is noted. There are no fractures. Visualized joints are well aligned. No definite plain radiographic evidence of osteomyelitis is noted.     Impression: No definite plain radiographic evidence of osteomyelitis in either foot. If there is high clinical concern consider MRI. Electronically signed by:  Eusebio Sargent  9/6/2022 9:05 PM CDT Workstation:  [id number]        [facility]  Number of Diagnoses or Management Options     Amount and/or Complexity of Data Reviewed  Clinical lab tests: reviewed  Tests in the radiology section of CPT®: reviewed    Patient Progress  Patient progress: stable    Patient found to have simple sepsis related to multiple diabetic foot wounds.  One toe on each foot with necrosis.  Concern for osteomyelitis.  X-ray does not reveal any obvious osteomyelitis.  Started on broad-spectrum coverage antibiotics.  Spoke with the resident on-call for admission.    Final diagnoses:   Sepsis without acute organ dysfunction, due to unspecified organism (HCC)   Diabetic foot ulcer associated with type 2 diabetes mellitus, unspecified laterality, unspecified part of foot, unspecified ulcer stage (HCC)   Hyperglycemia       ED Disposition  ED Disposition     ED Disposition   Decision to Admit    Condition   --    Comment   Level of Care: Med/Surg [1]   Diagnosis: Sepsis without acute organ dysfunction, due to unspecified organism (HCC) [[id number]]   Admitting Physician: KIT CONDON [[id number]]               No follow-up provider specified.       Medication List      No changes were made to your prescriptions during this visit.          Davy Ramires DO  09/06/22 2206      Electronically signed by Davy Ramires DO at 09/06/22 2206     Yadira Goldsmith PCT at 09/06/22 2122        Patient was placed on a pure wick.    Electronically signed by Yadira Goldsmith PCT at 09/06/22 2122     Mone Kumar RN at 09/06/22 2214        Left second toe noted to be black with wounds present.  Right great toe black with noted wounds and second toe reddened and wound noted    Electronically signed by Mone Kumar RN at 09/06/22 2258     Mone Kumar RN at 09/06/22 2258          Nursing report ED to floor  Yamileth Slater  62 y.o.  female    HPI:   Chief Complaint   Patient presents with   • Wound Check       Admitting doctor:   Kit Bella  MD Maykel    Consulting provider(s):  Consults     No orders found from 8/8/2022 to 9/7/2022.           Admitting diagnosis:   The primary encounter diagnosis was Sepsis without acute organ dysfunction, due to unspecified organism (HCC). Diagnoses of Diabetic foot ulcer associated with type 2 diabetes mellitus, unspecified laterality, unspecified part of foot, unspecified ulcer stage (HCC) and Hyperglycemia were also pertinent to this visit.    Code status:   Current Code Status     Date Active Code Status Order ID Comments User Context       Prior    Advance Care Planning Activity          Allergies:   Adhesive tape, Nsaids, Latex, and Other    Intake and Output  No intake or output data in the 24 hours ending 09/06/22 2259    Weight:       09/06/22  1854   Weight: 115 kg (254 lb)       Most recent vitals:   Vitals:    09/06/22 2056 09/06/22 2101 09/06/22 2106 09/06/22 2249   BP: 158/68   147/66   Pulse:  94  86   Resp:   18    Temp:       TempSrc:       SpO2:  100%  98%   Weight:       Height:           Active LDAs/IV Access:   Lines, Drains & Airways     Active LDAs     Name Placement date Placement time Site Days    Peripheral IV 09/06/22 2057 Left Antecubital 09/06/22 2057  Antecubital  less than 1    Peripheral IV 09/06/22 2234 Left Wrist 09/06/22 2234  Wrist  less than 1    Hemodialysis Cath Double 01/27/22  1106  Internal Jugular  222                Labs (abnormal labs have a star):   Labs Reviewed   COMPREHENSIVE METABOLIC PANEL - Abnormal; Notable for the following components:       Result Value    Glucose 549 (*)     BUN 43 (*)     Creatinine 3.18 (*)     Sodium 129 (*)     Chloride 89 (*)     Albumin 3.10 (*)     Alkaline Phosphatase 194 (*)     eGFR 15.8 (*)     All other components within normal limits    Narrative:     GFR Normal >60  Chronic Kidney Disease <60  Kidney Failure <15     CBC WITH AUTO DIFFERENTIAL - Abnormal; Notable for the following components:    WBC 14.21 (*)     Hemoglobin 10.4  (*)     MCHC 30.6 (*)     RDW 16.4 (*)     Neutrophil % 84.5 (*)     Lymphocyte % 7.2 (*)     Immature Grans % 1.0 (*)     Neutrophils, Absolute 12.01 (*)     Immature Grans, Absolute 0.14 (*)     All other components within normal limits   C-REACTIVE PROTEIN - Abnormal; Notable for the following components:    C-Reactive Protein 32.67 (*)     All other components within normal limits   SEDIMENTATION RATE - Abnormal; Notable for the following components:    Sed Rate 114 (*)     All other components within normal limits   LACTIC ACID, PLASMA - Normal   BLOOD CULTURE   BLOOD CULTURE   RAINBOW DRAW    Narrative:     The following orders were created for panel order [place] Draw.  Procedure                               Abnormality         Status                     ---------                               -----------         ------                     Green Top (Gel)[[id number]]                                  Final result               Lavender Top[[id number]]                                     Final result               Gold Top - SST[[id number]]                                                              Light Blue Top[[id number]]                                   Final result                 Please view results for these tests on the individual orders.   CBC AND DIFFERENTIAL    Narrative:     The following orders were created for panel order CBC & Differential.  Procedure                               Abnormality         Status                     ---------                               -----------         ------                     CBC Auto Differential[[id number]]        Abnormal            Final result                 Please view results for these tests on the individual orders.   GREEN TOP   LAVENDER TOP   LIGHT BLUE TOP   GOLD TOP - SST       Meds given in ED:   Medications   sodium chloride 0.9 % flush 10 mL (has no administration in time range)   vancomycin 1750 mg/500 mL 0.9% NS IVPB ([facility]) (1,750 mg  Intravenous New Bag 9/6/22 2252)   insulin regular (humuLIN R,novoLIN R) injection 8 Units (8 Units Intravenous Given 9/6/22 2250)           NIH Stroke Scale:       Isolation/Infection(s):  No active isolations   CRE, MRSA     COVID Testing  Collected no  Resulted     Nursing report ED to floor:  Mental status: Alert and oriented  Ambulatory status: assist X1 with walker  Precautions: none    ED nurse phone extentsion- [phone]    Electronically signed by Mone Kumar RN at 09/06/22 2259     Mone Kumar RN at 09/06/22 2302        DOT phrase and report given to Maribeth    Electronically signed by Mone Kumar RN at 09/06/22 2302         Lab Results (last 24 hours)     Procedure Component Value Units Date/Time    Vancomycin, Random [[id number]]  (Normal) Collected: 09/07/22 1325    Specimen: Blood Updated: 09/07/22 1355     Vancomycin Random 18.00 mcg/mL     Hepatitis B Surface Antigen [[id number]]  (Normal) Collected: 09/06/22 2337    Specimen: Blood Updated: 09/07/22 1305     Hepatitis B Surface Ag Non-Reactive    POC Glucose Once [[id number]]  (Abnormal) Collected: 09/07/22 1028    Specimen: Blood Updated: 09/07/22 1113     Glucose 317 mg/dL      Comment: RN NotifiedOperator: [id number] BRANNON LAKHANIANNAMeter ID: [id number]       POC Glucose Once [[id number]]  (Abnormal) Collected: 09/07/22 0846    Specimen: Blood Updated: 09/07/22 1001     Glucose 470 mg/dL      Comment: RN NotifiedOperator: [id number] BRANNON LAKHANIANNAMeter ID: [id number]       POC Glucose Once [[id number]]  (Abnormal) Collected: 09/07/22 0709    Specimen: Blood Updated: 09/07/22 1001     Glucose 457 mg/dL      Comment: RN NotifiedOperator: [id number] BRANNON LAKHANIANNAMeter ID: [id number]       POC Glucose Once [[id number]]  (Abnormal) Collected: 09/06/22 2346    Specimen: Blood Updated: 09/07/22 0950     Glucose 485 mg/dL      Comment: RN NotifiedOperator: [id number] [place] OPALMeter ID: [id number]       Hemoglobin A1c [[id number]]   (Abnormal) Collected: 09/07/22 0650    Specimen: Blood Updated: 09/07/22 0912     Hemoglobin A1C 11.00 %     Narrative:      Hemoglobin A1C Ranges:    Increased Risk for Diabetes  5.7% to 6.4%  Diabetes                     >= 6.5%  Diabetic Goal                < 7.0%    Comprehensive Metabolic Panel [[id number]]  (Abnormal) Collected: 09/07/22 0650    Specimen: Blood Updated: 09/07/22 0824     Glucose 452 mg/dL      BUN 42 mg/dL      Creatinine 3.19 mg/dL      Sodium 130 mmol/L      Potassium 3.7 mmol/L      Chloride 92 mmol/L      CO2 28.0 mmol/L      Calcium 8.8 mg/dL      Total Protein 7.6 g/dL      Albumin 2.60 g/dL      ALT (SGPT) <5 U/L      AST (SGOT) 7 U/L      Alkaline Phosphatase 172 U/L      Total Bilirubin 0.4 mg/dL      Globulin 5.0 gm/dL      A/G Ratio 0.5 g/dL      BUN/Creatinine Ratio 13.2     Anion Gap 10.0 mmol/L      eGFR 15.8 mL/min/1.73      Comment: National Kidney Foundation and American Society of Nephrology (ASN) Task Force recommended calculation based on the Chronic Kidney Disease Epidemiology Collaboration (CKD-EPI) equation refit without adjustment for race.       Narrative:      GFR Normal >60  Chronic Kidney Disease <60  Kidney Failure <15      Lipid Panel [[id number]]  (Abnormal) Collected: 09/07/22 0650    Specimen: Blood Updated: 09/07/22 0814     Total Cholesterol 124 mg/dL      Triglycerides 173 mg/dL      HDL Cholesterol 39 mg/dL      LDL Cholesterol  56 mg/dL      VLDL Cholesterol 29 mg/dL      LDL/HDL Ratio 1.29    Narrative:      Cholesterol Reference Ranges  (U.S. Department of Health and Human Services ATP III Classifications)    Desirable          <200 mg/dL  Borderline High    200-239 mg/dL  High Risk          >240 mg/dL      Triglyceride Reference Ranges  (U.S. Department of Health and Human Services ATP III Classifications)    Normal           <150 mg/dL  Borderline High  150-199 mg/dL  High             200-499 mg/dL  Very High        >500 mg/dL    HDL Reference  Ranges  (U.S. Department of Health and Human Services ATP III Classifications)    Low     <40 mg/dl (major risk factor for CHD)  High    >60 mg/dl ('negative' risk factor for CHD)        LDL Reference Ranges  (U.S. Department of Health and Human Services ATP III Classifications)    Optimal          <100 mg/dL  Near Optimal     100-129 mg/dL  Borderline High  130-159 mg/dL  High             160-189 mg/dL  Very High        >189 mg/dL    C-reactive Protein [[id number]]  (Abnormal) Collected: 09/07/22 0650    Specimen: Blood Updated: 09/07/22 0814     C-Reactive Protein 34.10 mg/dL     Protime-INR [[id number]]  (Abnormal) Collected: 09/07/22 0650    Specimen: Blood Updated: 09/07/22 0716     Protime 15.6 Seconds      INR 1.25    Narrative:      Therapeutic range for most indications is 2.0-3.0 INR,  or 2.5-3.5 for mechanical heart valves.    CBC Auto Differential [[id number]]  (Abnormal) Collected: 09/07/22 0651    Specimen: Blood Updated: 09/07/22 0701     WBC 12.47 10*3/mm3      RBC 3.59 10*6/mm3      Hemoglobin 9.7 g/dL      Hematocrit 31.6 %      MCV 88.0 fL      MCH 27.0 pg      MCHC 30.7 g/dL      RDW 16.2 %      RDW-SD 51.9 fl      MPV 8.9 fL      Platelets 230 10*3/mm3      Neutrophil % 78.3 %      Lymphocyte % 12.4 %      Monocyte % 7.1 %      Eosinophil % 1.0 %      Basophil % 0.6 %      Immature Grans % 0.6 %      Neutrophils, Absolute 9.74 10*3/mm3      Lymphocytes, Absolute 1.55 10*3/mm3      Monocytes, Absolute 0.89 10*3/mm3      Eosinophils, Absolute 0.13 10*3/mm3      Basophils, Absolute 0.08 10*3/mm3      Immature Grans, Absolute 0.08 10*3/mm3      nRBC 0.0 /100 WBC     [place] Draw [[id number]] Collected: 09/06/22 2058    Specimen: Blood Updated: 09/07/22 0048    Narrative:      The following orders were created for panel order [place] Draw.  Procedure                               Abnormality         Status                     ---------                               -----------         ------                      Green Top (Gel)[[id number]]                                  Final result               Lavender Top[[id number]]                                     Final result               Gold Top - SST[[id number]]                                   Final result               Light Blue Top[[id number]]                                   Final result                 Please view results for these tests on the individual orders.    Gold Top - SST [[id number]] Collected: 09/06/22 2337    Specimen: Blood Updated: 09/07/22 0048     Extra Tube Hold for add-ons.     Comment: Auto resulted.       Green Top (Gel) [[id number]] Collected: 09/06/22 2058    Specimen: Blood Updated: 09/06/22 2202     Extra Tube Hold for add-ons.     Comment: Auto resulted.       Lavender Top [[id number]] Collected: 09/06/22 2058    Specimen: Blood Updated: 09/06/22 2202     Extra Tube hold for add-on     Comment: Auto resulted       Light Blue Top [[id number]] Collected: 09/06/22 2058    Specimen: Blood Updated: 09/06/22 2202     Extra Tube Hold for add-ons.     Comment: Auto resulted       Comprehensive Metabolic Panel [[id number]]  (Abnormal) Collected: 09/06/22 2058    Specimen: Blood Updated: 09/06/22 2127     Glucose 549 mg/dL      BUN 43 mg/dL      Creatinine 3.18 mg/dL      Sodium 129 mmol/L      Potassium 3.7 mmol/L      Chloride 89 mmol/L      CO2 28.0 mmol/L      Calcium 9.1 mg/dL      Total Protein 8.1 g/dL      Albumin 3.10 g/dL      ALT (SGPT) 5 U/L      AST (SGOT) 9 U/L      Alkaline Phosphatase 194 U/L      Total Bilirubin 0.4 mg/dL      Globulin 5.0 gm/dL      A/G Ratio 0.6 g/dL      BUN/Creatinine Ratio 13.5     Anion Gap 12.0 mmol/L      eGFR 15.8 mL/min/1.73      Comment: National Kidney Foundation and American Society of Nephrology (ASN) Task Force recommended calculation based on the Chronic Kidney Disease Epidemiology Collaboration (CKD-EPI) equation refit without adjustment for race.       Narrative:      GFR Normal >60  Chronic Kidney  Disease <60  Kidney Failure <15      C-reactive Protein [[id number]]  (Abnormal) Collected: 09/06/22 2058    Specimen: Blood Updated: 09/06/22 2124     C-Reactive Protein 32.67 mg/dL     Lactic Acid, Plasma [[id number]]  (Normal) Collected: 09/06/22 2058    Specimen: Blood Updated: 09/06/22 2120     Lactate 1.6 mmol/L     Sedimentation Rate [[id number]]  (Abnormal) Collected: 09/06/22 2058    Specimen: Blood Updated: 09/06/22 2110     Sed Rate 114 mm/hr     CBC & Differential [[id number]]  (Abnormal) Collected: 09/06/22 2058    Specimen: Blood Updated: 09/06/22 2106    Narrative:      The following orders were created for panel order CBC & Differential.  Procedure                               Abnormality         Status                     ---------                               -----------         ------                     CBC Auto Differential[[id number]]        Abnormal            Final result                 Please view results for these tests on the individual orders.    CBC Auto Differential [[id number]]  (Abnormal) Collected: 09/06/22 2058    Specimen: Blood Updated: 09/06/22 2106     WBC 14.21 10*3/mm3      RBC 3.91 10*6/mm3      Hemoglobin 10.4 g/dL      Hematocrit 34.0 %      MCV 87.0 fL      MCH 26.6 pg      MCHC 30.6 g/dL      RDW 16.4 %      RDW-SD 51.9 fl      MPV 8.8 fL      Platelets 224 10*3/mm3      Neutrophil % 84.5 %      Lymphocyte % 7.2 %      Monocyte % 6.3 %      Eosinophil % 0.4 %      Basophil % 0.6 %      Immature Grans % 1.0 %      Neutrophils, Absolute 12.01 10*3/mm3      Lymphocytes, Absolute 1.02 10*3/mm3      Monocytes, Absolute 0.90 10*3/mm3      Eosinophils, Absolute 0.05 10*3/mm3      Basophils, Absolute 0.09 10*3/mm3      Immature Grans, Absolute 0.14 10*3/mm3      nRBC 0.0 /100 WBC     Blood Culture - Blood, Arm, Left [[id number]] Collected: 09/06/22 2058    Specimen: Blood from Arm, Left Updated: 09/06/22 2102        Imaging Results (Last 24 Hours)     Procedure Component Value Units  Date/Time    MRI Foot Right Without Contrast [[id number]] Collected: 09/07/22 1043     Updated: 09/07/22 1311    Narrative:      Comparison:  9/6/2022    Indication:  Right foot ulcer. Type 2 diabetes. Rule out  osteomyelitis.    Technique:  Magnetic resonance imaging of the right foot was  performed without intravenous contrast, utilizing routine  sequences.  Every series is degraded by patient motion.    Findings:    First ray:  There is normal bone configuration. There appears to  be bone marrow edema of the proximal phalanx.    Second ray:  There is normal bone configuration.  There is normal  bone marrow signal.  No evidence of fracture or osteomyelitis.    Third ray:  There is normal bone configuration. There appears to  be bone marrow edema of the phalanges.    Fourth ray:  There is normal bone configuration.  There is normal  bone marrow signal.  No evidence of fracture or osteomyelitis.    Fifth ray:  There is normal bone configuration.  There is normal  bone marrow signal.  No evidence of fracture or osteomyelitis.    Additional joints:  The visualized joints of the hindfoot and  midfoot appear grossly intact.  There are mild degenerative  changes.      Bursae and soft tissues: There is muscle fatty atrophy.    Subcutaneous edema of the first and second toes likely  cellulitis.    Other:      Impression:      Impression:  1. Severe motion degradation. The technologist notes the patient  was repeatedly asked to remain still. Multiple sequences are  repeated. Motion decreases sensitivity and specificity of the  exam.  2. There appears to be mild bone marrow edema involving the  phalanges of the first and second digits without clear  accompanying T1 signal changes. Findings are indeterminate for  osteomyelitis. Subcutaneous edema of the first and second toes  likely cellulitis. Limited sensitivity for abscess. Repeat MRI  examination with the addition of gadolinium contrast when the  patient is able to hold  still, or three-phase bone scan may be  considered if increased sensitivity and specificity is desired.  3. Muscle fatty atrophy suggesting presence of a polyneuropathy.    Electronically signed by:  Froylan Bennett MD  9/7/2022 1:09 PM CDT  Workstation: [id number]    XR Foot 3+ View Bilateral [[id number]] Collected: 09/06/22 2019     Updated: 09/06/22 2107    Narrative:      Bilateral foot x-ray three views on 9/6/2022    CLINICAL INDICATION: Wounds, sepsis, question osteomyelitis    COMPARISON: Left foot x-ray from 7/11/2021    FINDINGS:    Left foot: Vascular calcifications are noted. Moderate size  plantar calcaneal spur is noted. There are no fractures.  Visualized joints are well aligned. No definite plain  radiographic evidence of osteomyelitis is noted.    Right foot: Vascular calcifications are noted. Moderate size  plantar calcaneal spur is noted. There are no fractures.  Visualized joints are well aligned. No definite plain  radiographic evidence of osteomyelitis is noted.      Impression:      No definite plain radiographic evidence of osteomyelitis in  either foot. If there is high clinical concern consider MRI.    Electronically signed by:  Eusebio Sargent  9/6/2022 9:05 PM CDT  Workstation: [id number]        ECG/EMG Results (last 24 hours)     ** No results found for the last 24 hours. **        Physician Progress Notes (last 24 hours)  Notes from 09/06/22 1418 through 09/07/22 1418   No notes of this type exist for this encounter.         Medical Student Notes (last 24 hours)  Notes from 09/06/22 1418 through 09/07/22 1418   No notes of this type exist for this encounter.         Consult Notes (last 24 hours)  Notes from 09/06/22 1418 through 09/07/22 1418   No notes of this type exist for this encounter.

## 2022-09-07 NOTE — CONSULTS
[facility]   Consult Note    Patient Name: Yamileth Slater  : 1960  MRN: [id number]  Primary Care Physician:  Rianna Macias MD  Referring Physician: Davy Ramires DO  Date of admission: 2022    Consults  Subjective   Subjective     Reason for Consult/ Chief Complaint: toe wounds     History of Present Illness  Yamileth Slater is a 62 y.o. female with past medical history significant for CKD and diabetes seen at the request of primary care team for evaluation of toe wounds on both feet.  Patient is very lethargic during examination and is unable to sufficiently answer questions.  History obtained from the chart.    Review of Systems   Unable to perform ROS: Patient nonverbal        Personal History     Past Medical History:   Diagnosis Date   • Acute blood loss anemia 2017    Likely due to gastric oozing at this time. - Dr. Duarte (GI) was consulted and has now signed off, will follow up outpatient - pill colonoscopy showed AVMs - continue to monitor   • Acute cystitis with hematuria 3/31/2021    1/13: IV Rocephin 1 gm q 24  : transitioned  to omnicef 300mg. Urine cultures resulted and did not show growth. Omnicef discontinued as patient is asymptomatic   • Altered mental status 2022    - AMS on presentation - initial ABG pH 7.3, CO2 34 - Procal 0.29 - UA negative for acute cysitits -CTA head wnl  - Empiric Zosyn and Vancomycin -Lactate 2.5 on admission  - blood cultures no growth at 24 hours     • Anxiety    • CAD (coronary artery disease) 2021    S/P 3 stents 2021 for BHL Continue ASA 81mg & Clopidogrel 75mg Continue Atorvastatin 40mg   • Carotid artery stenosis    • Chronic obstructive lung disease (HCC)    • CKD (chronic kidney disease) stage 4, GFR 15-29 ml/min (HCC)    • CKD (chronic kidney disease), symptom management only, stage 5 (HCC) 10/5/2020    Results from last 7 days Lab Units 12/15/21 0548 21 1323 21 0916  CREATININE mg/dL 3.92* 3.21* 3.32*  Baseline creatinine 2-3 GFR 13-25 GFR 15 Dialysis MWF, sees Dr. Lauren Nephrology consult,, appreciate recommendations Continue Bumex 1mg bid daily Holding Bumex 2mg 4 times a week   • Colonic polyp    • Coronary arteriosclerosis    • Diabetes mellitus (HCC)    • Diabetic neuropathy (HCC)    • Ear pain, right 10/18/2021    - canal trauma due to patient scratching and DMT2 - added cortisporin ear drops   • Elevated troponin 10/12/2021    -most likely from CKD -Trending down -Neg chest pain   • Generalized abdominal pain 7/1/2022    Could be due to initiation of tube feeds vs dyspepsia vs abdominal cramps related to no PO intake due to intubation vs constipation Continue current laxative regiment  If no bowel movement by this afternoon will consider enema   • GERD (gastroesophageal reflux disease)    • GI bleed 5/13/2021    - GI will follow up outpatient - Protonix 40mg daily - Avoid medical DVT prophy and use mechanical at this time instead. - Continue to monitor - pill colonoscopy results showed AVMs   • History of transfusion    • Hypercholesterolemia    • Hyperosmolar hyperglycemic state (HHS) (HCC) 6/25/2022    Serum glucose 605 on admission  Anion gap 16 PH 7.37 Bicarb 27.9 Continue fluids  Insulin drip with HHS protocol  Anion gap closed around 10 AM, received one dose of Levamir subq, will stop insulin drip after 2 hrs     • Hypertension    • Hypokalemia 5/27/2022    Will replace as needed. Will be cautious in the setting of ESRD to avoid need for emergency dialysis   Monitor Qtc intervals on EKG     • Hypomagnesemia 6/27/2021    Monitor and replace   • Morbid obesity (HCC)    • Nephrolithiasis    • On mechanically assisted ventilation (HCC) 6/26/2022    Vent management and sedation orders placed.  - [facility] intensivist group consulted for vent management appreciate recommendations  - plan to extubate today     • Peripheral vascular disease (HCC)    • Pleural effusion  on right 6/26/2022    CXR on 6/30/22 read as a small upper left pulmonary edema vs early pneumonia.  Last Echo 1/2022 EF 61-65 % Continue to monitor  Procal slightly improved, CRP improved On Linezolid and meropenum      • SIRS (systemic inflammatory response syndrome) ([facility]) 1/9/2022    Admission  - WBC 17.78   -   - RR 16 - 1/10: VSS/wnl - CXR - Mild pulm edema - Blood cultures no growth at 24 hours  - Procalcitonin 0.29 - UA : glucose 1000, negative Leucocytes/nitrate - Empiric Zosyn and Vancomcyin    • Sleep apnea    • Substance abuse ([facility])    • Vitamin D deficiency        Past Surgical History:   Procedure Laterality Date   • CARDIAC CATHETERIZATION N/A 7/14/2020   • CARDIAC CATHETERIZATION N/A 4/23/2021    Procedure: Left Heart Cath;  Surgeon: Melba Romo MD;  Location: [facility] CATH INVASIVE LOCATION;  Service: Cardiology;  Laterality: N/A;   • CARDIAC CATHETERIZATION N/A 4/30/2021    Procedure: Percutaneous Coronary Intervention;  Surgeon: Russell Voss MD;  Location: [facility] CATH INVASIVE LOCATION;  Service: Cardiovascular;  Laterality: N/A;   • CARDIAC CATHETERIZATION N/A 4/30/2021    Procedure: Stent NIKKI coronary;  Surgeon: Russell Voss MD;  Location: [facility] CATH INVASIVE LOCATION;  Service: Cardiovascular;  Laterality: N/A;   • CARDIAC CATHETERIZATION Left 11/13/2021    Procedure: Left Heart Cath;  Surgeon: Niall Rios MD;  Location: [facility] CATH INVASIVE LOCATION;  Service: Cardiology;  Laterality: Left;   • CAROTID STENT Left    • COLONOSCOPY     • COLONOSCOPY N/A 5/14/2021    Procedure: COLONOSCOPY;  Surgeon: Mingo Duarte MD;  Location: [facility] ENDOSCOPY;  Service: Gastroenterology;  Laterality: N/A;   • CORONARY ARTERY BYPASS GRAFT N/A 2013    CABG X 3   • CYSTOSCOPY BLADDER STONE LITHOTRIPSY Bilateral    • ENDOSCOPY N/A 4/12/2021    Procedure: ESOPHAGOGASTRODUODENOSCOPY;  Surgeon: Mingo Duarte MD;  Location: [facility] ENDOSCOPY;  Service: Gastroenterology;   Laterality: N/A;   • ENDOSCOPY N/A 5/14/2021    Procedure: ESOPHAGOGASTRODUODENOSCOPY;  Surgeon: Mingo Duarte MD;  Location: [facility] ENDOSCOPY;  Service: Gastroenterology;  Laterality: N/A;   • ENDOSCOPY N/A 1/28/2022    Procedure: ESOPHAGOGASTRODUODENOSCOPY;  Surgeon: Mingo Duarte MD;  Location: [facility] ENDOSCOPY;  Service: Gastroenterology;  Laterality: N/A;   • INTERVENTIONAL RADIOLOGY PROCEDURE N/A 10/21/2021    Procedure: tunneled central venous catheter placement;  Surgeon: Donnie Robles MD;  Location: [facility] ANGIO INVASIVE LOCATION;  Service: Interventional Radiology;  Laterality: N/A;   • INTERVENTIONAL RADIOLOGY PROCEDURE N/A 1/27/2022    Procedure: tunneled central venous catheter placement;  Surgeon: Donnie Robles MD;  Location: [facility] ANGIO INVASIVE LOCATION;  Service: Interventional Radiology;  Laterality: N/A;       Family History: family history includes Diabetes in her father; Heart attack in her father, half-brother, and half-brother; Heart disease in her brother, father, half-sister, and mother; Lung cancer in her mother; No Known Problems in her brother, brother, maternal grandfather, maternal grandmother, paternal grandfather, paternal grandmother, sister, sister, sister, sister, and sister; Pancreatic cancer in her half-sister. Otherwise pertinent FHx was reviewed and not pertinent to current issue.    Social History:  reports that she quit smoking about 6 months ago. Her smoking use included cigarettes. She started smoking about 23 years ago. She has a 11.50 pack-year smoking history. She has never used smokeless tobacco. She reports previous drug use. Drugs: LSD, Marijuana, and Methamphetamines. She reports that she does not drink alcohol.    Home Medications:   [employer] Blood Glucose Meter, Capsaicin, Cetirizine HCl, DULoxetine, Diclofenac Sodium, Insulin Lispro (1 Unit Dial), Insulin Pen Needle, Insulin Syringe-Needle U-100, Methoxy PEG-Epoetin Beta, O2, Semaglutide,  acetaminophen, albuterol, albuterol sulfate HFA, aspirin, atorvastatin, bumetanide, clopidogrel, collagenase, gabapentin, glucose blood, insulin aspart, insulin detemir, ipratropium-albuterol, isosorbide mononitrate, levothyroxine, lisinopril, metoprolol tartrate, montelukast, muscle rub, nitroglycerin, ondansetron ODT, oxybutynin XL, pantoprazole, promethazine, ranolazine, sevelamer, and sodium bicarbonate    Allergies:  Allergies   Allergen Reactions   • Adhesive Tape Hives, Other (See Comments) and Rash   • Nsaids Hives   • Latex Other (See Comments)   • Other Itching     Bandaids, MRSA, SURECLOSE       Objective    Objective     Vitals:  Temp:  [97 °F (36.1 °C)-99 °F (37.2 °C)] 97 °F (36.1 °C)  Heart Rate:  [] 71  Resp:  [18-22] 18  BP: (108-193)/(54-79) 163/73  Flow (L/min):  [2-3] 2    Physical Exam  Constitutional:       General: She is sleeping.      Appearance: She is morbidly obese.   Cardiovascular:      Pulses:           Dorsalis pedis pulses are 0 on the right side and 0 on the left side.        Posterior tibial pulses are 0 on the right side and 0 on the left side.   Musculoskeletal:        Feet:    Feet:      Right foot:      Skin integrity: Ulcer and erythema present.      Left foot:      Skin integrity: Ulcer present.   Neurological:      Mental Status: She is lethargic.         Result Review    Result Review:  I have personally reviewed the results from the time of this admission to 9/7/2022 17:29 CDT and agree with these findings:  []  Laboratory list / accordion  []  Microbiology  [x]  Radiology  []  EKG/Telemetry   []  Cardiology/Vascular   []  Pathology  [x]  Old records  []  Other:         Assessment & Plan   Assessment / Plan     Brief Patient Summary:  Yamileth Slater is a 62 y.o. female who has dry gangrene to digits on both feet    Active Hospital Problems:  Active Hospital Problems    Diagnosis    • **Gangrene of both feet (HCC)    • Sepsis without acute organ dysfunction, due  to unspecified organism (HCC)    • Anemia    • ESRD on hemodialysis (HCC)    • Physical deconditioning    • Type 2 diabetes mellitus with autonomic neuropathy (HCC)    • Cellulitis of left leg    • MIKE and COPD overlap syndrome (HCC)    • Hypothyroidism    • GERD (gastroesophageal reflux disease)    • Hypertension      Plan:     Patient examined and evaluated.  Imaging and records reviewed.  Arterial Doppler has been ordered to evaluate perfusion to lower extremities.  Patient may require digital amputation in the future.  Betadine daily to gangrenous digits.  Will follow with you.  Thank you for allow me to participate in the care of this patient.  Please do not hesitate to call with questions.    Jean Oliveira DPM

## 2022-09-07 NOTE — PLAN OF CARE
Goal Outcome Evaluation:  Plan of Care Reviewed With: patient        Progress: improving   She has been awake and oriented x 4 when awake.With complaints of severe pain she reported as everywhere and in BLE and bilateral hips.Tolerating ice chips well.No s/s of aspiration noted.Requesting increased diet.Reports pain relief with morphine and rest after receiving ativan.

## 2022-09-08 DIAGNOSIS — E11.43 TYPE 2 DIABETES MELLITUS WITH DIABETIC AUTONOMIC NEUROPATHY, WITH LONG-TERM CURRENT USE OF INSULIN: ICD-10-CM

## 2022-09-08 DIAGNOSIS — Z79.4 TYPE 2 DIABETES MELLITUS WITH DIABETIC AUTONOMIC NEUROPATHY, WITH LONG-TERM CURRENT USE OF INSULIN: ICD-10-CM

## 2022-09-08 PROBLEM — A41.9 SEPSIS WITHOUT ACUTE ORGAN DYSFUNCTION, DUE TO UNSPECIFIED ORGANISM: Status: RESOLVED | Noted: 2022-09-06 | Resolved: 2022-09-08

## 2022-09-08 PROBLEM — E11.628 CELLULITIS OF FOOT ASSOCIATED WITH DIABETES MELLITUS: Status: ACTIVE | Noted: 2021-07-30

## 2022-09-08 PROBLEM — L03.119 CELLULITIS OF FOOT ASSOCIATED WITH DIABETES MELLITUS: Status: ACTIVE | Noted: 2021-07-30

## 2022-09-08 LAB
ALBUMIN SERPL-MCNC: 2.9 G/DL (ref 3.5–5.2)
ALBUMIN/GLOB SERPL: 0.6 G/DL
ALP SERPL-CCNC: 139 U/L (ref 39–117)
ALT SERPL W P-5'-P-CCNC: <5 U/L (ref 1–33)
ANION GAP SERPL CALCULATED.3IONS-SCNC: 8 MMOL/L (ref 5–15)
AST SERPL-CCNC: 8 U/L (ref 1–32)
BASOPHILS # BLD AUTO: 0.06 10*3/MM3 (ref 0–0.2)
BASOPHILS NFR BLD AUTO: 0.6 % (ref 0–1.5)
BILIRUB SERPL-MCNC: 0.3 MG/DL (ref 0–1.2)
BUN SERPL-MCNC: 18 MG/DL (ref 8–23)
BUN/CREAT SERPL: 10.3 (ref 7–25)
CALCIUM SPEC-SCNC: 8.7 MG/DL (ref 8.6–10.5)
CHLORIDE SERPL-SCNC: 97 MMOL/L (ref 98–107)
CO2 SERPL-SCNC: 31 MMOL/L (ref 22–29)
CREAT SERPL-MCNC: 1.75 MG/DL (ref 0.57–1)
DEPRECATED RDW RBC AUTO: 50.1 FL (ref 37–54)
EGFRCR SERPLBLD CKD-EPI 2021: 32.4 ML/MIN/1.73
EOSINOPHIL # BLD AUTO: 0.2 10*3/MM3 (ref 0–0.4)
EOSINOPHIL NFR BLD AUTO: 2 % (ref 0.3–6.2)
ERYTHROCYTE [DISTWIDTH] IN BLOOD BY AUTOMATED COUNT: 15.9 % (ref 12.3–15.4)
GLOBULIN UR ELPH-MCNC: 4.6 GM/DL
GLUCOSE BLDC GLUCOMTR-MCNC: 118 MG/DL (ref 70–130)
GLUCOSE BLDC GLUCOMTR-MCNC: 127 MG/DL (ref 70–130)
GLUCOSE BLDC GLUCOMTR-MCNC: 192 MG/DL (ref 70–130)
GLUCOSE BLDC GLUCOMTR-MCNC: 290 MG/DL (ref 70–130)
GLUCOSE BLDC GLUCOMTR-MCNC: 54 MG/DL (ref 70–130)
GLUCOSE BLDC GLUCOMTR-MCNC: 56 MG/DL (ref 70–130)
GLUCOSE BLDC GLUCOMTR-MCNC: 58 MG/DL (ref 70–130)
GLUCOSE BLDC GLUCOMTR-MCNC: 91 MG/DL (ref 70–130)
GLUCOSE BLDC GLUCOMTR-MCNC: 93 MG/DL (ref 70–130)
GLUCOSE SERPL-MCNC: 70 MG/DL (ref 65–99)
HCT VFR BLD AUTO: 30 % (ref 34–46.6)
HGB BLD-MCNC: 9.5 G/DL (ref 12–15.9)
IMM GRANULOCYTES # BLD AUTO: 0.05 10*3/MM3 (ref 0–0.05)
IMM GRANULOCYTES NFR BLD AUTO: 0.5 % (ref 0–0.5)
LYMPHOCYTES # BLD AUTO: 1.57 10*3/MM3 (ref 0.7–3.1)
LYMPHOCYTES NFR BLD AUTO: 15.7 % (ref 19.6–45.3)
MCH RBC QN AUTO: 26.9 PG (ref 26.6–33)
MCHC RBC AUTO-ENTMCNC: 31.7 G/DL (ref 31.5–35.7)
MCV RBC AUTO: 85 FL (ref 79–97)
MONOCYTES # BLD AUTO: 0.77 10*3/MM3 (ref 0.1–0.9)
MONOCYTES NFR BLD AUTO: 7.7 % (ref 5–12)
NEUTROPHILS NFR BLD AUTO: 7.36 10*3/MM3 (ref 1.7–7)
NEUTROPHILS NFR BLD AUTO: 73.5 % (ref 42.7–76)
NRBC BLD AUTO-RTO: 0 /100 WBC (ref 0–0.2)
PLATELET # BLD AUTO: 247 10*3/MM3 (ref 140–450)
PMV BLD AUTO: 8.8 FL (ref 6–12)
POTASSIUM SERPL-SCNC: 3.4 MMOL/L (ref 3.5–5.2)
PROT SERPL-MCNC: 7.5 G/DL (ref 6–8.5)
RBC # BLD AUTO: 3.53 10*6/MM3 (ref 3.77–5.28)
SODIUM SERPL-SCNC: 136 MMOL/L (ref 136–145)
WBC NRBC COR # BLD: 10.01 10*3/MM3 (ref 3.4–10.8)

## 2022-09-08 PROCEDURE — 97162 PT EVAL MOD COMPLEX 30 MIN: CPT

## 2022-09-08 PROCEDURE — 80053 COMPREHEN METABOLIC PANEL: CPT | Performed by: STUDENT IN AN ORGANIZED HEALTH CARE EDUCATION/TRAINING PROGRAM

## 2022-09-08 PROCEDURE — 63710000001 INSULIN DETEMIR PER 5 UNITS: Performed by: STUDENT IN AN ORGANIZED HEALTH CARE EDUCATION/TRAINING PROGRAM

## 2022-09-08 PROCEDURE — 94761 N-INVAS EAR/PLS OXIMETRY MLT: CPT

## 2022-09-08 PROCEDURE — 25010000002 PIPERACILLIN SOD-TAZOBACTAM PER 1 G: Performed by: STUDENT IN AN ORGANIZED HEALTH CARE EDUCATION/TRAINING PROGRAM

## 2022-09-08 PROCEDURE — 82962 GLUCOSE BLOOD TEST: CPT

## 2022-09-08 PROCEDURE — 25010000002 PIPERACILLIN SOD-TAZOBACTAM PER 1 G: Performed by: FAMILY MEDICINE

## 2022-09-08 PROCEDURE — 94664 DEMO&/EVAL PT USE INHALER: CPT

## 2022-09-08 PROCEDURE — 25010000002 MORPHINE PER 10 MG: Performed by: STUDENT IN AN ORGANIZED HEALTH CARE EDUCATION/TRAINING PROGRAM

## 2022-09-08 PROCEDURE — 85025 COMPLETE CBC W/AUTO DIFF WBC: CPT | Performed by: STUDENT IN AN ORGANIZED HEALTH CARE EDUCATION/TRAINING PROGRAM

## 2022-09-08 PROCEDURE — 63710000001 INSULIN ASPART PER 5 UNITS: Performed by: STUDENT IN AN ORGANIZED HEALTH CARE EDUCATION/TRAINING PROGRAM

## 2022-09-08 PROCEDURE — 94760 N-INVAS EAR/PLS OXIMETRY 1: CPT

## 2022-09-08 PROCEDURE — 97166 OT EVAL MOD COMPLEX 45 MIN: CPT

## 2022-09-08 PROCEDURE — 94799 UNLISTED PULMONARY SVC/PX: CPT

## 2022-09-08 RX ORDER — ASPIRIN 81 MG/1
324 TABLET, CHEWABLE ORAL ONCE
Status: COMPLETED | OUTPATIENT
Start: 2022-09-09 | End: 2022-09-09

## 2022-09-08 RX ORDER — GABAPENTIN 300 MG/1
CAPSULE ORAL
Qty: 45 CAPSULE | Refills: 2 | OUTPATIENT
Start: 2022-09-08

## 2022-09-08 RX ORDER — CLOPIDOGREL BISULFATE 75 MG/1
75 TABLET ORAL DAILY
Status: DISCONTINUED | OUTPATIENT
Start: 2022-09-09 | End: 2022-09-10 | Stop reason: HOSPADM

## 2022-09-08 RX ORDER — HEPARIN SODIUM 1000 [USP'U]/ML
4000 INJECTION, SOLUTION INTRAVENOUS; SUBCUTANEOUS AS NEEDED
Status: DISCONTINUED | OUTPATIENT
Start: 2022-09-09 | End: 2022-09-10 | Stop reason: HOSPADM

## 2022-09-08 RX ORDER — ACETAMINOPHEN 650 MG
TABLET, EXTENDED RELEASE ORAL DAILY
Status: DISCONTINUED | OUTPATIENT
Start: 2022-09-08 | End: 2022-09-10 | Stop reason: HOSPADM

## 2022-09-08 RX ORDER — ALBUMIN (HUMAN) 12.5 G/50ML
12.5 SOLUTION INTRAVENOUS AS NEEDED
Status: DISPENSED | OUTPATIENT
Start: 2022-09-09 | End: 2022-09-09

## 2022-09-08 RX ADMIN — BUMETANIDE 2 MG: 1 TABLET ORAL at 08:53

## 2022-09-08 RX ADMIN — RANOLAZINE 500 MG: 500 TABLET, FILM COATED, EXTENDED RELEASE ORAL at 21:56

## 2022-09-08 RX ADMIN — METOPROLOL TARTRATE 25 MG: 25 TABLET, FILM COATED ORAL at 20:43

## 2022-09-08 RX ADMIN — Medication 10 ML: at 08:55

## 2022-09-08 RX ADMIN — CLOPIDOGREL BISULFATE 75 MG: 75 TABLET ORAL at 08:54

## 2022-09-08 RX ADMIN — DOCUSATE SODIUM 50 MG AND SENNOSIDES 8.6 MG 2 TABLET: 8.6; 5 TABLET, FILM COATED ORAL at 08:53

## 2022-09-08 RX ADMIN — MELATONIN 5.25 MG: 3 TAB ORAL at 20:35

## 2022-09-08 RX ADMIN — Medication 10 ML: at 20:44

## 2022-09-08 RX ADMIN — CETIRIZINE HYDROCHLORIDE 5 MG: 5 TABLET ORAL at 08:53

## 2022-09-08 RX ADMIN — ORAL SEMAGLUTIDE 7 MG: 7 TABLET ORAL at 13:13

## 2022-09-08 RX ADMIN — ATORVASTATIN CALCIUM 20 MG: 20 TABLET, FILM COATED ORAL at 08:53

## 2022-09-08 RX ADMIN — DOCUSATE SODIUM 50 MG AND SENNOSIDES 8.6 MG 2 TABLET: 8.6; 5 TABLET, FILM COATED ORAL at 20:43

## 2022-09-08 RX ADMIN — LISINOPRIL 20 MG: 20 TABLET ORAL at 08:55

## 2022-09-08 RX ADMIN — MORPHINE SULFATE 1 MG: 2 INJECTION, SOLUTION INTRAMUSCULAR; INTRAVENOUS at 08:54

## 2022-09-08 RX ADMIN — LEVOTHYROXINE SODIUM 25 MCG: 25 TABLET ORAL at 08:53

## 2022-09-08 RX ADMIN — MORPHINE SULFATE 1 MG: 2 INJECTION, SOLUTION INTRAMUSCULAR; INTRAVENOUS at 16:45

## 2022-09-08 RX ADMIN — ISOSORBIDE MONONITRATE 30 MG: 30 TABLET, EXTENDED RELEASE ORAL at 06:30

## 2022-09-08 RX ADMIN — OXYBUTYNIN CHLORIDE 5 MG: 5 TABLET, EXTENDED RELEASE ORAL at 08:53

## 2022-09-08 RX ADMIN — INSULIN DETEMIR 30 UNITS: 100 INJECTION, SOLUTION SUBCUTANEOUS at 20:54

## 2022-09-08 RX ADMIN — POVIDONE-IODINE: 10 SOLUTION TOPICAL at 13:12

## 2022-09-08 RX ADMIN — IPRATROPIUM BROMIDE AND ALBUTEROL SULFATE 3 ML: 2.5; .5 SOLUTION RESPIRATORY (INHALATION) at 10:47

## 2022-09-08 RX ADMIN — SEVELAMER CARBONATE 800 MG: 800 TABLET, FILM COATED ORAL at 08:53

## 2022-09-08 RX ADMIN — PIPERACILLIN SODIUM AND TAZOBACTAM SODIUM 2.25 G: 2; .25 INJECTION, POWDER, LYOPHILIZED, FOR SOLUTION INTRAVENOUS at 00:30

## 2022-09-08 RX ADMIN — IPRATROPIUM BROMIDE AND ALBUTEROL SULFATE 3 ML: 2.5; .5 SOLUTION RESPIRATORY (INHALATION) at 15:05

## 2022-09-08 RX ADMIN — NYSTATIN: 100000 POWDER TOPICAL at 08:54

## 2022-09-08 RX ADMIN — INSULIN ASPART 4 UNITS: 100 INJECTION, SOLUTION INTRAVENOUS; SUBCUTANEOUS at 17:09

## 2022-09-08 RX ADMIN — RANOLAZINE 500 MG: 500 TABLET, FILM COATED, EXTENDED RELEASE ORAL at 08:53

## 2022-09-08 RX ADMIN — ASPIRIN 81 MG: 81 TABLET, FILM COATED ORAL at 08:54

## 2022-09-08 RX ADMIN — NYSTATIN: 100000 POWDER TOPICAL at 20:37

## 2022-09-08 RX ADMIN — SEVELAMER CARBONATE 800 MG: 800 TABLET, FILM COATED ORAL at 18:02

## 2022-09-08 RX ADMIN — IPRATROPIUM BROMIDE AND ALBUTEROL SULFATE 3 ML: 2.5; .5 SOLUTION RESPIRATORY (INHALATION) at 20:14

## 2022-09-08 RX ADMIN — COLLAGENASE SANTYL 1 APPLICATION: 250 OINTMENT TOPICAL at 08:54

## 2022-09-08 RX ADMIN — IPRATROPIUM BROMIDE AND ALBUTEROL SULFATE 3 ML: 2.5; .5 SOLUTION RESPIRATORY (INHALATION) at 07:53

## 2022-09-08 RX ADMIN — MONTELUKAST 10 MG: 10 TABLET, FILM COATED ORAL at 20:43

## 2022-09-08 RX ADMIN — PIPERACILLIN SODIUM AND TAZOBACTAM SODIUM 4.5 G: 4; .5 INJECTION, POWDER, LYOPHILIZED, FOR SOLUTION INTRAVENOUS at 11:39

## 2022-09-08 RX ADMIN — SEVELAMER CARBONATE 800 MG: 800 TABLET, FILM COATED ORAL at 11:39

## 2022-09-08 RX ADMIN — METOPROLOL TARTRATE 25 MG: 25 TABLET, FILM COATED ORAL at 08:53

## 2022-09-08 RX ADMIN — GABAPENTIN 300 MG: 300 CAPSULE ORAL at 08:53

## 2022-09-08 RX ADMIN — MORPHINE SULFATE 1 MG: 2 INJECTION, SOLUTION INTRAMUSCULAR; INTRAVENOUS at 20:36

## 2022-09-08 RX ADMIN — PANTOPRAZOLE SODIUM 40 MG: 40 TABLET, DELAYED RELEASE ORAL at 20:43

## 2022-09-08 NOTE — PLAN OF CARE
Goal Outcome Evaluation:  Plan of Care Reviewed With: patient           Outcome Evaluation: Initial PT evaluation complete.  Patient is alert, cooperative with bed mobility but limited by pain.  She transfers supine<-->sit, SPV.  Once EOB, patient able to sit without LOB, educated on seated ther ex.  Transferring to standing and gait deferred until gangrene of toes is addressed. Patient also c/o discomfort on buttocks.  Patient educated on bed mobility technique for pressure relief, able to roll L and R, and to scoot up in bed with SPV.  Patient also educated on bed controls as she reported relief of buttocks pain when height of knee portion of bed was adjusted. RN notified.  Goals established, continue skilled I/P PT.

## 2022-09-08 NOTE — PLAN OF CARE
Goal Outcome Evaluation:      Pt Vitals WNL overnight. Levemir held r/t BS being below 100. Pt had 2L taken off in dialysis. Lethargic but rousable, Aox4. Slept most of shift.

## 2022-09-08 NOTE — PROGRESS NOTES
[facility]ILY [facility] SERVICE  DAILY PROGRESS NOTE    NAME: Yamileth Slater  : 1960  MRN: [id number]      LOS: 1 day     PROVIDER OF SERVICE: Rianna Macias MD    Chief Complaint: Gangrene of both feet (HCC)    Subjective:     Interval History:  History taken from: patient chart RN    Patient because hypoglycemic at 58 last night, and required dextrose 2x and orange juice.  Patient reports that she still has lower extremity pain bilaterally. She reports  That she has not eaten this morning since she is PO for possible  procedure with podiatry.     Review of Systems:   Review of Systems   Constitutional: Negative for appetite change, diaphoresis and unexpected weight change.   Respiratory: Negative for cough, chest tightness, shortness of breath and wheezing.    Cardiovascular: Negative for chest pain and palpitations.   Gastrointestinal: Negative for abdominal pain, constipation, diarrhea, nausea and vomiting.   Musculoskeletal: Positive for myalgias.   Skin: Positive for wound.   Neurological: Negative for dizziness and headaches.   Psychiatric/Behavioral: Negative for agitation.       Objective:     Vital Signs  Temp:  [96.4 °F (35.8 °C)-96.8 °F (36 °C)] 96.4 °F (35.8 °C)  Heart Rate:  [61-81] 81  Resp:  [16-18] 18  BP: (100-163)/(59-77) 159/77  Flow (L/min):  [2] 2   Body mass index is 46.64 kg/m².    Physical Exam  Physical Exam  Vitals reviewed.   Constitutional:       General: She is not in acute distress.     Appearance: She is obese. She is not ill-appearing.   HENT:      Head: Normocephalic and atraumatic.      Right Ear: External ear normal.      Left Ear: External ear normal.      Nose: Nose normal.      Mouth/Throat:      Mouth: Mucous membranes are moist.   Eyes:      Conjunctiva/sclera: Conjunctivae normal.   Cardiovascular:      Rate and Rhythm: Normal rate and regular rhythm.   Pulmonary:      Effort: Pulmonary effort is normal. No respiratory distress.      Breath sounds:  Normal breath sounds.      Comments: On 2 L NC  Abdominal:      Palpations: Abdomen is soft.      Tenderness: There is no abdominal tenderness.   Feet:      Comments: Ulcers present on right 1st and 2nd toe and left 2nd toe.   Neurological:      Mental Status: She is alert.         Scheduled Meds   atorvastatin, 20 mg, Oral, Daily  bumetanide, 2 mg, Oral, Once per day on Sun Tue Thu Sat  cetirizine, 5 mg, Oral, Daily  collagenase, 1 application, Topical, Daily  epoetin hubert/hubert-epbx, 6,000 Units, Intravenous, Once per day on Mon Wed Fri  gabapentin, 300 mg, Oral, Daily  hydrocortisone-bacitracin-zinc oxide-nystatin, 1 application, Topical, BID  Insulin Aspart, 0-24 Units, Subcutaneous, TID AC  Insulin Aspart, 30 Units, Subcutaneous, TID With Meals  insulin detemir, 30 Units, Subcutaneous, Q12H  ipratropium-albuterol, 3 mL, Nebulization, 4x Daily - RT  isosorbide mononitrate, 30 mg, Oral, QAM  levothyroxine, 25 mcg, Oral, Daily  lisinopril, 20 mg, Oral, Daily  metoprolol tartrate, 25 mg, Oral, Q12H  montelukast, 10 mg, Oral, Nightly  nystatin, , Topical, Q12H  oxybutynin XL, 5 mg, Oral, Daily  pantoprazole, 40 mg, Oral, Nightly  PATIENT SUPPLIED MEDICATION, 7 mg, Oral, Daily  piperacillin-tazobactam, 4.5 g, Intravenous, Q12H  ranolazine, 500 mg, Oral, Q12H  senna-docusate sodium, 2 tablet, Oral, BID  sevelamer, 800 mg, Oral, TID With Meals  sodium chloride, 10 mL, Intravenous, Q12H       PRN Meds   •  acetaminophen  •  albumin human  •  albuterol  •  senna-docusate sodium **AND** polyethylene glycol **AND** bisacodyl **AND** bisacodyl  •  Capsaicin  •  dextrose  •  dextrose  •  glucagon (human recombinant)  •  heparin (porcine)  •  melatonin  •  Morphine **AND** naloxone  •  nitroglycerin  •  ondansetron **OR** ondansetron  •  Pharmacy to dose vancomycin  •  Pharmacy to Dose Zosyn  •  sodium chloride  •  sodium chloride      Diagnostic Data    Results from last 7 days   Lab Units 09/08/22  0522   WBC 10*3/mm3 10.01    HEMOGLOBIN g/dL 9.5*   HEMATOCRIT % 30.0*   PLATELETS 10*3/mm3 247   GLUCOSE mg/dL 70   CREATININE mg/dL 1.75*   BUN mg/dL 18   SODIUM mmol/L 136   POTASSIUM mmol/L 3.4*   AST (SGOT) U/L 8   ALT (SGPT) U/L <5   ALK PHOS U/L 139*   BILIRUBIN mg/dL 0.3   ANION GAP mmol/L 8.0       MRI Foot Right Without Contrast    Result Date: 9/7/2022  Impression: 1. Severe motion degradation. The technologist notes the patient was repeatedly asked to remain still. Multiple sequences are repeated. Motion decreases sensitivity and specificity of the exam. 2. There appears to be mild bone marrow edema involving the phalanges of the first and second digits without clear accompanying T1 signal changes. Findings are indeterminate for osteomyelitis. Subcutaneous edema of the first and second toes likely cellulitis. Limited sensitivity for abscess. Repeat MRI examination with the addition of gadolinium contrast when the patient is able to hold still, or three-phase bone scan may be considered if increased sensitivity and specificity is desired. 3. Muscle fatty atrophy suggesting presence of a polyneuropathy. Electronically signed by:  Froylan Bennett MD  9/7/2022 1:09 PM CDT Workstation: [id number]    US Arterial Doppler Lower Extremity Complete    Result Date: 9/7/2022  Moderate diffuse atherosclerotic irregularity with no evidence of any significant femoral or popliteal artery stenosis or occlusion. There appears to be three-vessel runoff bilaterally Electronically signed by:  Pablo Rubio MD  9/7/2022 11:52 PM CDT Workstation: [id number]    XR Foot 3+ View Bilateral    Result Date: 9/6/2022  No definite plain radiographic evidence of osteomyelitis in either foot. If there is high clinical concern consider MRI. Electronically signed by:  Eusebio Sargent  9/6/2022 9:05 PM CDT Workstation: [id number]        I reviewed the patient's new clinical results.    Assessment/Plan:     Active and Resolved Problems  Active Hospital Problems    Diagnosis  POA    • **Gangrene of both feet ([facility]) [I96]  Unknown   • Anemia [D64.9]  Yes   • ESRD on hemodialysis ([facility]) [N18.6, Z99.2]  Not Applicable   • Physical deconditioning [R53.81]  Yes   • Type 2 diabetes mellitus with autonomic neuropathy ([facility]) [E11.43]  Yes   • Cellulitis of foot associated with diabetes mellitus ([facility]) [E11.628, L03.119]  Yes   • MIKE and COPD overlap syndrome ([facility]) [G47.33, J44.9]  Yes   • Hypothyroidism [E03.9]  Yes   • GERD (gastroesophageal reflux disease) [K21.9]  Yes   • Hypertension [I10]  Yes   • Mixed hyperlipidemia [E78.2]  Yes   • Coronary artery disease involving native coronary artery of native heart without angina pectoris [I25.10]  Yes      Resolved Hospital Problems   No resolved problems to display.       #Gangrene of both feet:  Known vasculopath and poorly controlled diabetic. Presented with leukocytosis and elevated CRP. Patient with a complex infectious disease history with known MRSA infection and previous CRE infection as well.   - Patient on Vancomycin and Zosyn - need for MRSA and Pseudomonas coverage - Pharmacy to help with dosage  - Podiatry consulted, Currently NPO. Possible procedure   - CRP up-trending from 32.67 to 34.1, will recheck tomorrow  - Sed rate elevated at 114, recheck tomorrow  - Bilateral MRI of the foot inconclusive, possible osteomyelitis. Was not able to get with contrast due to ESKD and patient was moving during imaging  - Patient has history of PAD. Arterial doppler of lower extremities with moderate atherosclerotic irregularity.    #Sepsis:  Patient has elevated leukocytosis however not febrile, heart rate has normalized and respiration rate normalized.    - Continue to monitor.    - Lactate was normal at 1.6.   - Blood culture pedning     #Cellulitis:  - Above antibiotic coverage will likely cover for any possible cellulitis.   - Continue to monitor progression via visual exam and response to lab work.     #ESRD on dialysis:  - Patient is on dialysis 3  times a week (M/W/F)  - Last dialysis was 9/1/2022.   - Creatinine slightly above baseline at 3.19 on admission, currently 1.75  - Nephrology consulted for dialysis  - At current presentation, risks of not appropriately treating sepsis/gangrene/cellulitis outweighs potential nephrotoxic nature of antibiotics listed above.    - Avoid other nephrotoxic agents.     #MIKE/COPD:   - Nasal cannula oxygen to maintain oxygen saturation 82 to 92%.   - Patient may use home trilogy machine.     #Hypothyroidism:   - Continue home levothyroxine 25 mcg dosage.     #Diabetes:  Patient reports home regimen of 40 units long-acting insulin twice a day and 40 units short acting insulin prandially.   - Patient started on cardiac/consistent carb/renal diet which is vastly different from her home dosage.    - Slight decrease in insulin at this time.   - Current regimen will be Levemir 30 units twice daily and 30 units prandial with high-dose sliding scale insulin.  - Will restart rebylsus 7 mg for better glucose control, rybelsus 3 mg provided sample provided  - NPO currently.     #Anemia:   - Chronic and stable.    - Continue to monitor  - Restart epogen     #Hypertension:   - Restart home medications    #CAD  Patient on home atorvastatin, aspirin, Plavix, and metoprolol tartrate.       DVT prophylaxis:  Medical DVT prophylaxis orders are present.     Code status is   Code Status and Medical Interventions:   Ordered at: 09/06/22 2310     Level Of Support Discussed With:    Patient     Code Status (Patient has no pulse and is not breathing):    CPR (Attempt to Resuscitate)     Medical Interventions (Patient has pulse or is breathing):    Full Support       Plan for disposition:Home with HH in 2-3 days days    Time: 30 minutes    Signature  [place] [address]  Office: [phone]        This document has been electronically signed by Rianna  MD Gilberto on September 8, 2022 12:13 CDT

## 2022-09-08 NOTE — THERAPY EVALUATION
Patient Name: Yamileth Slater  : 1960    MRN: [id number]                              Today's Date: 2022       Admit Date: 2022    Visit Dx:     ICD-10-CM ICD-9-CM   1. Sepsis without acute organ dysfunction, due to unspecified organism ([facility])  A41.9 038.9     995.91   2. Diabetic foot ulcer associated with type 2 diabetes mellitus, unspecified laterality, unspecified part of foot, unspecified ulcer stage ([facility])  E11.621 250.80    L97.509 707.15   3. Hyperglycemia  R73.9 790.29   4. Impaired physical mobility  Z74.09 781.99   5. Impaired mobility and ADLs  Z74.09 V49.89    Z78.9      Patient Active Problem List   Diagnosis   • Chronic midline low back pain without sciatica   • Vitamin D deficiency   • Tobacco dependence syndrome   • Shoulder joint pain   • Morbid obesity with BMI of 50.0-59.9, adult ([facility])   • Mixed hyperlipidemia   • Kidney stone   • History of colon polyps   • GERD (gastroesophageal reflux disease)   • Excoriated eczema   • Hypertension   • COPD (chronic obstructive pulmonary disease) ([facility])   • Chronic folliculitis   • Coronary artery disease involving native coronary artery of native heart without angina pectoris   • 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   • Hypothyroidism   • Carotid artery disease ([facility])   • [place] abuse   • PAD (peripheral artery disease) ([facility])   • Venous insufficiency   • LAFB (left anterior fascicular block)   • Pulmonary hypertension ([facility])   • Left hip pain   • Neck pain   • MIKE and COPD overlap syndrome ([facility])   • Pneumonia due to COVID-19 virus   • Hyperkalemia   • Cutaneous candidiasis   • Community acquired pneumonia of right middle lobe of lung   • MRSA infection   • Esophageal dysphagia   • Monilial esophagitis ([facility])   • NSTEMI, initial episode of care ([facility])   • Cellulitis of foot associated with diabetes mellitus ([facility])   • Urinary tract infection due to Proteus   • History of insertion of tunneled central venous catheter (CVC)  with port   • Anemia due to chronic kidney disease, on chronic dialysis ([facility])   • Pneumonitis   • Personal history of noncompliance with medical treatment, presenting hazards to health   • Type 2 diabetes mellitus with autonomic neuropathy ([facility])   • Physical deconditioning   • ESRD on hemodialysis ([facility])   • DVT prophylaxis   • Anemia   • Ventilator associated pneumonia ([facility])   • Positive fecal occult blood test   • Yeast UTI   • Gangrene of both feet ([facility])     Past Medical History:   Diagnosis Date   • Acute blood loss anemia 4/16/2017    Likely due to gastric oozing at this time. - Dr. Duarte (GI) was consulted and has now signed off, will follow up outpatient - pill colonoscopy showed AVMs - continue to monitor   • Acute cystitis with hematuria 3/31/2021    1/13: IV Rocephin 1 gm q 24 1/14 : transitioned  to omnicef 300mg. Urine cultures resulted and did not show growth. Omnicef discontinued as patient is asymptomatic   • Altered mental status 1/9/2022    - AMS on presentation - initial ABG pH 7.3, CO2 34 - Procal 0.29 - UA negative for acute cysitits -CTA head wnl  - Empiric Zosyn and Vancomycin -Lactate 2.5 on admission  - blood cultures no growth at 24 hours     • Anxiety    • CAD (coronary artery disease) 4/24/2021    S/P 3 stents 5/1/2021 for [facility] Continue ASA 81mg & Clopidogrel 75mg Continue Atorvastatin 40mg   • Carotid artery stenosis    • Chronic obstructive lung disease (HCC)    • CKD (chronic kidney disease) stage 4, GFR 15-29 ml/min ([facility])    • CKD (chronic kidney disease), symptom management only, stage 5 ([facility]) 10/5/2020    Results from last 7 days Lab Units 12/15/21 0548 12/14/21 1323 12/14/21 0916 CREATININE mg/dL 3.92* 3.21* 3.32*  Baseline creatinine 2-3 GFR 13-25 GFR 15 Dialysis MWF, sees Dr. Lauren Nephrology consult,, appreciate recommendations Continue Bumex 1mg bid daily Holding Bumex 2mg 4 times a week   • Colonic polyp    • Coronary arteriosclerosis    • Diabetes mellitus (HCC)    • Diabetic  neuropathy ([facility])    • Ear pain, right 10/18/2021    - canal trauma due to patient scratching and DMT2 - added cortisporin ear drops   • Elevated troponin 10/12/2021    -most likely from CKD -Trending down -Neg chest pain   • Generalized abdominal pain 7/1/2022    Could be due to initiation of tube feeds vs dyspepsia vs abdominal cramps related to no PO intake due to intubation vs constipation Continue current laxative regiment  If no bowel movement by this afternoon will consider enema   • GERD (gastroesophageal reflux disease)    • GI bleed 5/13/2021    - GI will follow up outpatient - Protonix 40mg daily - Avoid medical DVT prophy and use mechanical at this time instead. - Continue to monitor - pill colonoscopy results showed AVMs   • History of transfusion    • Hypercholesterolemia    • Hyperosmolar hyperglycemic state (HHS) ([facility]) 6/25/2022    Serum glucose 605 on admission  Anion gap 16 PH 7.37 Bicarb 27.9 Continue fluids  Insulin drip with [facility] protocol  Anion gap closed around 10 AM, received one dose of Levamir subq, will stop insulin drip after 2 hrs     • Hypertension    • Hypokalemia 5/27/2022    Will replace as needed. Will be cautious in the setting of ESRD to avoid need for emergency dialysis   Monitor Qtc intervals on EKG     • Hypomagnesemia 6/27/2021    Monitor and replace   • Morbid obesity ([facility])    • Nephrolithiasis    • On mechanically assisted ventilation ([facility]) 6/26/2022    Vent management and sedation orders placed.  - [facility] intensivist group consulted for vent management appreciate recommendations  - plan to extubate today     • Peripheral vascular disease ([facility])    • Pleural effusion on right 6/26/2022    CXR on 6/30/22 read as a small upper left pulmonary edema vs early pneumonia.  Last Echo 1/2022 EF 61-65 % Continue to monitor  Procal slightly improved, CRP improved On Linezolid and meropenum      • SIRS (systemic inflammatory response syndrome) ([facility]) 1/9/2022    Admission  - WBC  17.78   -   - RR 16 - 1/10: VSS/wnl - CXR - Mild pulm edema - Blood cultures no growth at 24 hours  - Procalcitonin 0.29 - UA : glucose 1000, negative Leucocytes/nitrate - Empiric Zosyn and Vancomcyin    • Sleep apnea    • Substance abuse (HCC)    • Vitamin D deficiency      Past Surgical History:   Procedure Laterality Date   • CARDIAC CATHETERIZATION N/A 7/14/2020   • CARDIAC CATHETERIZATION N/A 4/23/2021    Procedure: Left Heart Cath;  Surgeon: Melba Romo MD;  Location: [facility] CATH INVASIVE LOCATION;  Service: Cardiology;  Laterality: N/A;   • CARDIAC CATHETERIZATION N/A 4/30/2021    Procedure: Percutaneous Coronary Intervention;  Surgeon: Russell Voss MD;  Location: [facility] CATH INVASIVE LOCATION;  Service: Cardiovascular;  Laterality: N/A;   • CARDIAC CATHETERIZATION N/A 4/30/2021    Procedure: Stent NIKKI coronary;  Surgeon: Russell Voss MD;  Location: [facility] CATH INVASIVE LOCATION;  Service: Cardiovascular;  Laterality: N/A;   • CARDIAC CATHETERIZATION Left 11/13/2021    Procedure: Left Heart Cath;  Surgeon: Niall Rios MD;  Location: [facility] CATH INVASIVE LOCATION;  Service: Cardiology;  Laterality: Left;   • CAROTID STENT Left    • COLONOSCOPY     • COLONOSCOPY N/A 5/14/2021    Procedure: COLONOSCOPY;  Surgeon: Mingo Duarte MD;  Location: [facility] ENDOSCOPY;  Service: Gastroenterology;  Laterality: N/A;   • CORONARY ARTERY BYPASS GRAFT N/A 2013    CABG X 3   • CYSTOSCOPY BLADDER STONE LITHOTRIPSY Bilateral    • ENDOSCOPY N/A 4/12/2021    Procedure: ESOPHAGOGASTRODUODENOSCOPY;  Surgeon: Mingo Duarte MD;  Location: [facility] ENDOSCOPY;  Service: Gastroenterology;  Laterality: N/A;   • ENDOSCOPY N/A 5/14/2021    Procedure: ESOPHAGOGASTRODUODENOSCOPY;  Surgeon: Mingo Duarte MD;  Location: [facility] ENDOSCOPY;  Service: Gastroenterology;  Laterality: N/A;   • ENDOSCOPY N/A 1/28/2022    Procedure: ESOPHAGOGASTRODUODENOSCOPY;  Surgeon: Mingo Daurte MD;   Location: [facility] ENDOSCOPY;  Service: Gastroenterology;  Laterality: N/A;   • INTERVENTIONAL RADIOLOGY PROCEDURE N/A 10/21/2021    Procedure: tunneled central venous catheter placement;  Surgeon: Donnie Robles MD;  Location: [facility] ANGIO INVASIVE LOCATION;  Service: Interventional Radiology;  Laterality: N/A;   • INTERVENTIONAL RADIOLOGY PROCEDURE N/A 1/27/2022    Procedure: tunneled central venous catheter placement;  Surgeon: Donnie Robles MD;  Location: [facility] ANGIO INVASIVE LOCATION;  Service: Interventional Radiology;  Laterality: N/A;      General Information     Row Name 09/08/22 1610          OT Time and Intention    Document Type evaluation  -     Mode of Treatment occupational therapy  -     Row Name 09/08/22 1610          General Information    Patient Profile Reviewed yes  -SJ     Prior Level of Function independent:;gait;transfer;bed mobility;feeding;grooming;min assist:;dressing;bathing  -SJ     Existing Precautions/Restrictions fall;other (see comments)  gangerous toes; RUE restricted extremity  -SJ     Barriers to Rehab medically complex  -     Row Name 09/08/22 1610          Living Environment    People in Home spouse  -     Row Name 09/08/22 1610          Stairs Within Home, Primary    Stairs, Within Home, Primary Ambulates short distances at home with FWW, 3 LPM O2. Transport chair for outside appointment. Tub/shower, has a shower chair, but manage to transfer by sitting and sliding onto chair. Can get to toilet independently with RW.  -     Row Name 09/08/22 1610          Cognition    Orientation Status (Cognition) oriented x 4  -     Row Name 09/08/22 1610          Safety Issues, Functional Mobility    Safety Issues Affecting Function (Mobility) safety precaution awareness  -SJ     Impairments Affecting Function (Mobility) strength;endurance/activity tolerance;pain  -SJ           User Key  (r) = Recorded By, (t) = Taken By, (c) = Cosigned By    Initials Name  Provider Type     Ottoniel Robles OT Occupational Therapist                 Mobility/ADL's     Row Name 09/08/22 1610          Bed Mobility    Bed Mobility supine-sit;sit-supine  -     Supine-Sit [place] (Bed Mobility) supervision  -     Sit-Supine [place] (Bed Mobility) supervision  -     Assistive Device (Bed Mobility) head of bed elevated;bed rails  -     Row Name 09/08/22 1610          Activities of Daily Living    BADL Assessment/Intervention grooming;feeding  -     Row Name 09/08/22 1610          Mobility    Extremity Weight-bearing Status left lower extremity;right lower extremity  -     Left Lower Extremity (Weight-bearing Status) non weight-bearing (NWB)   gangrenous toes  -     Right Lower Extremity (Weight-bearing Status) non weight-bearing (NWB)   gangrenous toes  -     Row Name 09/08/22 1610          Grooming Assessment/Training    [place] Level (Grooming) set up;wash face, hands  -     Row Name 09/08/22 1610          Self-Feeding Assessment/Training    [place] Level (Feeding) set up  -           User Key  (r) = Recorded By, (t) = Taken By, (c) = Cosigned By    Initials Name Provider Type     Ottoniel Robles OT Occupational Therapist               Obj/Interventions     Row Name 09/08/22 1610          Sensory Assessment (Somatosensory)    Sensory Assessment (Somatosensory) UE sensation intact  -     Row Name 09/08/22 1610          Range of Motion Comprehensive    General Range of Motion bilateral upper extremity ROM WFL  -     Row Name 09/08/22 1610          Strength Comprehensive (MMT)    General Manual Muscle Testing (MMT) Assessment other (see comments)  -     Comment, General Manual Muscle Testing (MMT) Assessment BUE 4-/5 grossly  -           User Key  (r) = Recorded By, (t) = Taken By, (c) = Cosigned By    Initials Name Provider Type     Ottoniel Robles OT Occupational Therapist               Goals/Plan     Row Name 09/08/22 1610           Bathing Goal 1 (OT)    Activity/Device (Bathing Goal 1, OT) upper body bathing;lower body bathing  -SJ     Bee Level/Cues Needed (Bathing Goal 1, OT) set-up required;minimum assist (75% or more patient effort)  -SJ     Time Frame (Bathing Goal 1, OT) long term goal (LTG);by discharge  -SJ     Progress/Outcomes (Bathing Goal 1, OT) goal not met  -     Row Name 09/08/22 1610          Dressing Goal 1 (OT)    Activity/Device (Dressing Goal 1, OT) upper body dressing  -SJ     Bee/Cues Needed (Dressing Goal 1, OT) set-up required  -SJ     Time Frame (Dressing Goal 1, OT) long term goal (LTG);by discharge  -SJ     Progress/Outcome (Dressing Goal 1, OT) goal not met  -     Row Name 09/08/22 1610          Strength Goal 1 (OT)    Strength Goal 1 (OT) Patient to perform BUE HEP with independence to increase strength and endurance to faciliate ADLs and transfers.  -SJ     Time Frame (Strength Goal 1, OT) long term goal (LTG);by discharge  -SJ     Progress/Outcome (Strength Goal 1, OT) goal not met  -     Row Name 09/08/22 1610          Therapy Assessment/Plan (OT)    Planned Therapy Interventions (OT) activity tolerance training;adaptive equipment training;BADL retraining;cognitive/visual perception retraining;edema control/reduction;manual therapy/joint mobilization;IADL retraining;functional balance retraining;occupation/activity based interventions;passive ROM/stretching;patient/caregiver education/training;neuromuscular control/coordination retraining;transfer/mobility retraining;strengthening exercise;prosthetic fitting/training  -           User Key  (r) = Recorded By, (t) = Taken By, (c) = Cosigned By    Initials Name Provider Type     Ottoniel Robles OT Occupational Therapist               Clinical Impression     Row Name 09/08/22 1610          Pain Assessment    Pretreatment Pain Rating 6/10  -SJ     Posttreatment Pain Rating 6/10  -SJ     Pain Location - Side/Orientation Bilateral  -      Pain Location - toe  -[facility] Name 09/08/22 1610          Plan of Care Review    Plan of Care Reviewed With patient  -     Outcome Evaluation OT eval complete, bed eval only, podiatry following for foot wounds. Treated NWB yaz LE with upgrades as WB as per podiatry recs. Supine<>sit with supervision. Static and dynamic sitting good, no LOB noted. Patient able to wash hands/face and eat ice chips EOB with good sitiing balance. Patien tolerated 15 mins of static sitting, returned to bed. Patient sidelying R at end of session, heels elevated. Patient with decreased activity tolerance, decreased independence in ADLs, and decreased safety in transfers. Cont inpatient OT. Dispo TBD pending podiatry interventions.  -[facility] Name 09/08/22 1610          Therapy Assessment/Plan (OT)    Patient/Family Therapy Goal Statement (OT) get stronger  -     Rehab Potential (OT) good, to achieve stated therapy goals  -     Criteria for Skilled Therapeutic Interventions Met (OT) no problems identified which require skilled intervention;yes  -     Therapy Frequency (OT) other (see comments)  3-7 d/wk  -     Predicted Duration of Therapy Intervention (OT) until d/c or all goals met  -[facility] Name 09/08/22 1610          Therapy Plan Review/Discharge Plan (OT)    Anticipated Discharge Disposition (OT) other (see comments)  TBD pending podiatry intervention  -[facility] Name 09/08/22 1610          Vital Signs    Pre Systolic BP Rehab 108  -SJ     Pre Treatment Diastolic BP 61  -SJ     Pretreatment Heart Rate (beats/min) 83  -SJ     Pre SpO2 (%) 97  -SJ     O2 Delivery Pre Treatment nasal cannula  2 lpm  -SJ     Pre Patient Position Supine  -SJ     Post Patient Position Side Lying  -[facility] Name 09/08/22 1610          Positioning and Restraints    In Bed side lying right;notified nsg;call light within reach;exit alarm on;encouraged to call for assist  -           User Key  (r) = Recorded By, (t) = Taken By, (c) =  Cosigned By    Initials Name Provider Type     Ottoniel Robles, OT Occupational Therapist               Outcome Measures     Row Name 09/08/22 1610          How much help from another is currently needed...    Putting on and taking off regular lower body clothing? 1  -SJ     Bathing (including washing, rinsing, and drying) 2  -SJ     Toileting (which includes using toilet bed pan or urinal) 2  -SJ     Putting on and taking off regular upper body clothing 3  -SJ     Taking care of personal grooming (such as brushing teeth) 3  -SJ     Eating meals 4  -SJ     AM-PAC 6 Clicks Score (OT) 15  -SJ     Row Name 09/08/22 1425 09/08/22 0800       How much help from another person do you currently need...    Turning from your back to your side while in flat bed without using bedrails? 3  -CZ 2  -EG    Moving from lying on back to sitting on the side of a flat bed without bedrails? 3  -CZ 2  -EG    Moving to and from a bed to a chair (including a wheelchair)? 3  -CZ 2  -EG    Standing up from a chair using your arms (e.g., wheelchair, bedside chair)? 3  -CZ 2  -EG    Climbing 3-5 steps with a railing? 2  -CZ 2  -EG    To walk in hospital room? 2  -CZ 2  -EG    AM-PAC 6 Clicks Score (PT) 16  -CZ 12  -EG    Highest level of mobility 5 --> Static standing  -CZ 4 --> Transferred to chair/commode  -EG    Row Name 09/08/22 1610 09/08/22 1425       Functional Assessment    Outcome Measure Options AM-PAC 6 Clicks Daily Activity (OT)  - AM-PAC 6 Clicks Basic Mobility (PT)  -CZ          User Key  (r) = Recorded By, (t) = Taken By, (c) = Cosigned By    Initials Name Provider Type    CZ Christopher Arnett, PT Physical Therapist     Ottoniel Robles, OT Occupational Therapist    EG Selena Vasquez RN Registered Nurse                Occupational Therapy Education                 Title: PT OT SLP Therapies (In Progress)     Topic: Occupational Therapy (In Progress)     Point: ADL training (Done)     Description:   Instruct learner(s) on  proper safety adaptation and remediation techniques during self care or transfers.   Instruct in proper use of assistive devices.              Learning Progress Summary           Patient Acceptance, E,TB, VU by  at 9/8/2022 1640    Comment: POC, role of OT, transfer training                   Point: Home exercise program (Not Started)     Description:   Instruct learner(s) on appropriate technique for monitoring, assisting and/or progressing therapeutic exercises/activities.              Learner Progress:  Not documented in this visit.          Point: Precautions (Done)     Description:   Instruct learner(s) on prescribed precautions during self-care and functional transfers.              Learning Progress Summary           Patient Acceptance, E,TB, VU by  at 9/8/2022 1640    Comment: POC, role of OT, transfer training                   Point: Body mechanics (Done)     Description:   Instruct learner(s) on proper positioning and spine alignment during self-care, functional mobility activities and/or exercises.              Learning Progress Summary           Patient Acceptance, E,TB, VU by  at 9/8/2022 1640    Comment: POC, role of OT, transfer training                               User Key     Initials Effective Dates Name Provider Type Discipline     06/14/21 -  Ottoniel Robles OT Occupational Therapist OT              OT Recommendation and Plan  Planned Therapy Interventions (OT): activity tolerance training, adaptive equipment training, BADL retraining, cognitive/visual perception retraining, edema control/reduction, manual therapy/joint mobilization, IADL retraining, functional balance retraining, occupation/activity based interventions, passive ROM/stretching, patient/caregiver education/training, neuromuscular control/coordination retraining, transfer/mobility retraining, strengthening exercise, prosthetic fitting/training  Therapy Frequency (OT): other (see comments) (3-7 d/wk)  Plan of Care  Review  Plan of Care Reviewed With: patient  Outcome Evaluation: OT eval complete, bed eval only, podiatry following for foot wounds. Treated NWB yaz LE with upgrades as WB as per podiatry recs. Supine<>sit with supervision. Static and dynamic sitting good, no LOB noted. Patient able to wash hands/face and eat ice chips EOB with good sitiing balance. Patien tolerated 15 mins of static sitting, returned to bed. Patient sidelying R at end of session, heels elevated. Patient with decreased activity tolerance, decreased independence in ADLs, and decreased safety in transfers. Cont inpatient OT. Dispo TBD pending podiatry interventions.     Time Calculation:    Time Calculation- OT     Row Name 09/08/22 1643 09/08/22 1057          Time Calculation- OT    OT Start Time 1310  -SJ --     OT Stop Time 1335  -SJ --     OT Time Calculation (min) 25 min  -SJ --     OT Received On 09/08/22  - --     OT - Next Appointment -- 09/09/22  -ROSANNA     OT Goal Re-Cert Due Date 09/21/22  - --            Untimed Charges    OT Eval/Re-eval Minutes 25  -SJ --            Total Minutes    Untimed Charges Total Minutes 25  -SJ --      Total Minutes 25  -SJ --           User Key  (r) = Recorded By, (t) = Taken By, (c) = Cosigned By    Initials Name Provider Type    Christopher Alvarenga, PT Physical Therapist     Ottoniel Robles OT Occupational Therapist              Therapy Charges for Today     Code Description Service Date Service Provider Modifiers Qty    [id number] HC OT EVAL MOD COMPLEXITY 2 9/8/2022 Ottoniel Robles OT GO 1               Ottoniel Robles OT  9/8/2022

## 2022-09-08 NOTE — NURSING NOTE
Podiatry following patient.  Betadine ordered daily for bilateral toes; primary nurse aware.  Wound care to sign off.

## 2022-09-08 NOTE — THERAPY EVALUATION
Patient Name: Yamileth Slater  : 1960    MRN: [id number]                              Today's Date: 2022       Admit Date: 2022    Visit Dx:     ICD-10-CM ICD-9-CM   1. Sepsis without acute organ dysfunction, due to unspecified organism ([facility])  A41.9 038.9     995.91   2. Diabetic foot ulcer associated with type 2 diabetes mellitus, unspecified laterality, unspecified part of foot, unspecified ulcer stage ([facility])  E11.621 250.80    L97.509 707.15   3. Hyperglycemia  R73.9 790.29   4. Impaired physical mobility  Z74.09 781.99     Patient Active Problem List   Diagnosis   • Chronic midline low back pain without sciatica   • Vitamin D deficiency   • Tobacco dependence syndrome   • Shoulder joint pain   • Morbid obesity with BMI of 50.0-59.9, adult ([facility])   • Mixed hyperlipidemia   • Kidney stone   • History of colon polyps   • GERD (gastroesophageal reflux disease)   • Excoriated eczema   • Hypertension   • COPD (chronic obstructive pulmonary disease) ([facility])   • Chronic folliculitis   • Coronary artery disease involving native coronary artery of native heart without angina pectoris   • 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   • Hypothyroidism   • Carotid artery disease ([facility])   • [place] abuse   • PAD (peripheral artery disease) ([facility])   • Venous insufficiency   • LAFB (left anterior fascicular block)   • Pulmonary hypertension ([facility])   • Left hip pain   • Neck pain   • MIKE and COPD overlap syndrome ([facility])   • Pneumonia due to COVID-19 virus   • Hyperkalemia   • Cutaneous candidiasis   • Community acquired pneumonia of right middle lobe of lung   • MRSA infection   • Esophageal dysphagia   • Monilial esophagitis ([facility])   • NSTEMI, initial episode of care ([facility])   • Cellulitis of foot associated with diabetes mellitus ([facility])   • Urinary tract infection due to Proteus   • History of insertion of tunneled central venous catheter (CVC) with port   • Anemia due to chronic kidney disease, on  chronic dialysis ([facility])   • Pneumonitis   • Personal history of noncompliance with medical treatment, presenting hazards to health   • Type 2 diabetes mellitus with autonomic neuropathy ([facility])   • Physical deconditioning   • ESRD on hemodialysis ([facility])   • DVT prophylaxis   • Anemia   • Ventilator associated pneumonia ([facility])   • Positive fecal occult blood test   • Yeast UTI   • Gangrene of both feet ([facility])     Past Medical History:   Diagnosis Date   • Acute blood loss anemia 4/16/2017    Likely due to gastric oozing at this time. - Dr. Duarte (GI) was consulted and has now signed off, will follow up outpatient - pill colonoscopy showed AVMs - continue to monitor   • Acute cystitis with hematuria 3/31/2021    1/13: IV Rocephin 1 gm q 24 1/14 : transitioned  to omnicef 300mg. Urine cultures resulted and did not show growth. Omnicef discontinued as patient is asymptomatic   • Altered mental status 1/9/2022    - AMS on presentation - initial ABG pH 7.3, CO2 34 - Procal 0.29 - UA negative for acute cysitits -CTA head wnl  - Empiric Zosyn and Vancomycin -Lactate 2.5 on admission  - blood cultures no growth at 24 hours     • Anxiety    • CAD (coronary artery disease) 4/24/2021    S/P 3 stents 5/1/2021 for [facility] Continue ASA 81mg & Clopidogrel 75mg Continue Atorvastatin 40mg   • Carotid artery stenosis    • Chronic obstructive lung disease ([facility])    • CKD (chronic kidney disease) stage 4, GFR 15-29 ml/min ([facility])    • CKD (chronic kidney disease), symptom management only, stage 5 ([facility]) 10/5/2020    Results from last 7 days Lab Units 12/15/21 0548 12/14/21 1323 12/14/21 0916 CREATININE mg/dL 3.92* 3.21* 3.32*  Baseline creatinine 2-3 GFR 13-25 GFR 15 Dialysis MWF, sees Dr. Lauren Nephrology consult,, appreciate recommendations Continue Bumex 1mg bid daily Holding Bumex 2mg 4 times a week   • Colonic polyp    • Coronary arteriosclerosis    • Diabetes mellitus ([facility])    • Diabetic neuropathy ([facility])    • Ear pain, right 10/18/2021    -  canal trauma due to patient scratching and DMT2 - added cortisporin ear drops   • Elevated troponin 10/12/2021    -most likely from CKD -Trending down -Neg chest pain   • Generalized abdominal pain 7/1/2022    Could be due to initiation of tube feeds vs dyspepsia vs abdominal cramps related to no PO intake due to intubation vs constipation Continue current laxative regiment  If no bowel movement by this afternoon will consider enema   • GERD (gastroesophageal reflux disease)    • GI bleed 5/13/2021    - GI will follow up outpatient - Protonix 40mg daily - Avoid medical DVT prophy and use mechanical at this time instead. - Continue to monitor - pill colonoscopy results showed AVMs   • History of transfusion    • Hypercholesterolemia    • Hyperosmolar hyperglycemic state (HHS) ([facility]) 6/25/2022    Serum glucose 605 on admission  Anion gap 16 PH 7.37 Bicarb 27.9 Continue fluids  Insulin drip with [facility] protocol  Anion gap closed around 10 AM, received one dose of Levamir subq, will stop insulin drip after 2 hrs     • Hypertension    • Hypokalemia 5/27/2022    Will replace as needed. Will be cautious in the setting of ESRD to avoid need for emergency dialysis   Monitor Qtc intervals on EKG     • Hypomagnesemia 6/27/2021    Monitor and replace   • Morbid obesity ([facility])    • Nephrolithiasis    • On mechanically assisted ventilation ([facility]) 6/26/2022    Vent management and sedation orders placed.  - [facility] intensivist group consulted for vent management appreciate recommendations  - plan to extubate today     • Peripheral vascular disease ([facility])    • Pleural effusion on right 6/26/2022    CXR on 6/30/22 read as a small upper left pulmonary edema vs early pneumonia.  Last Echo 1/2022 EF 61-65 % Continue to monitor  Procal slightly improved, CRP improved On Linezolid and meropenum      • SIRS (systemic inflammatory response syndrome) ([facility]) 1/9/2022    Admission  - WBC 17.78   -   - RR 16 - 1/10: VSS/wnl - CXR - Mild  pulm edema - Blood cultures no growth at 24 hours  - Procalcitonin 0.29 - UA : glucose 1000, negative Leucocytes/nitrate - Empiric Zosyn and Vancomcyin    • Sleep apnea    • Substance abuse ([facility])    • Vitamin D deficiency      Past Surgical History:   Procedure Laterality Date   • CARDIAC CATHETERIZATION N/A 7/14/2020   • CARDIAC CATHETERIZATION N/A 4/23/2021    Procedure: Left Heart Cath;  Surgeon: Melba Romo MD;  Location: [facility] CATH INVASIVE LOCATION;  Service: Cardiology;  Laterality: N/A;   • CARDIAC CATHETERIZATION N/A 4/30/2021    Procedure: Percutaneous Coronary Intervention;  Surgeon: Russell Voss MD;  Location: [facility] CATH INVASIVE LOCATION;  Service: Cardiovascular;  Laterality: N/A;   • CARDIAC CATHETERIZATION N/A 4/30/2021    Procedure: Stent NIKKI coronary;  Surgeon: Russell Voss MD;  Location: [facility] CATH INVASIVE LOCATION;  Service: Cardiovascular;  Laterality: N/A;   • CARDIAC CATHETERIZATION Left 11/13/2021    Procedure: Left Heart Cath;  Surgeon: Niall Rios MD;  Location: [facility] CATH INVASIVE LOCATION;  Service: Cardiology;  Laterality: Left;   • CAROTID STENT Left    • COLONOSCOPY     • COLONOSCOPY N/A 5/14/2021    Procedure: COLONOSCOPY;  Surgeon: Mingo Duarte MD;  Location: [facility] ENDOSCOPY;  Service: Gastroenterology;  Laterality: N/A;   • CORONARY ARTERY BYPASS GRAFT N/A 2013    CABG X 3   • CYSTOSCOPY BLADDER STONE LITHOTRIPSY Bilateral    • ENDOSCOPY N/A 4/12/2021    Procedure: ESOPHAGOGASTRODUODENOSCOPY;  Surgeon: Mingo Duarte MD;  Location: [facility] ENDOSCOPY;  Service: Gastroenterology;  Laterality: N/A;   • ENDOSCOPY N/A 5/14/2021    Procedure: ESOPHAGOGASTRODUODENOSCOPY;  Surgeon: Mingo Duarte MD;  Location: [facility] ENDOSCOPY;  Service: Gastroenterology;  Laterality: N/A;   • ENDOSCOPY N/A 1/28/2022    Procedure: ESOPHAGOGASTRODUODENOSCOPY;  Surgeon: Mingo Duarte MD;  Location: [facility] ENDOSCOPY;  Service: Gastroenterology;   Laterality: N/A;   • INTERVENTIONAL RADIOLOGY PROCEDURE N/A 10/21/2021    Procedure: tunneled central venous catheter placement;  Surgeon: Donnie Robles MD;  Location: [facility] ANGIO INVASIVE LOCATION;  Service: Interventional Radiology;  Laterality: N/A;   • INTERVENTIONAL RADIOLOGY PROCEDURE N/A 1/27/2022    Procedure: tunneled central venous catheter placement;  Surgeon: Donnie Robles MD;  Location: [facility] INVASIVE LOCATION;  Service: Interventional Radiology;  Laterality: N/A;      General Information     Row Name 09/08/22 1425          Physical Therapy Time and Intention    Document Type evaluation  -CZ     Mode of Treatment physical therapy  -CZ     Row Name 09/08/22 1425          General Information    Patient Profile Reviewed yes  -CZ     Prior Level of Function independent:;all household mobility  -CZ     Existing Precautions/Restrictions fall;other (see comments)  Gangrenous toes.  -CZ     Row Name 09/08/22 1425          Living Environment    People in Home spouse  -CZ     Row Name 09/08/22 1425          Home Main Entrance    Number of Stairs, Main Entrance none  -CZ     Row Name 09/08/22 1425          Stairs Within Home, Primary    Stairs, Within Home, Primary Ambulates short distances at home with FWW, 3 LPM O2. Transport chair for outside appointment.  -CZ     Number of Stairs, Within Home, Primary none  -CZ     Row Name 09/08/22 1425          Cognition    Orientation Status (Cognition) oriented x 4  -CZ     Row Name 09/08/22 1425          Safety Issues, Functional Mobility    Impairments Affecting Function (Mobility) strength;endurance/activity tolerance;pain  -CZ           User Key  (r) = Recorded By, (t) = Taken By, (c) = Cosigned By    Initials Name Provider Type    CZ Christopher Arnett, PT Physical Therapist               Mobility     Row Name 09/08/22 1425          Bed Mobility    Bed Mobility supine-sit;sit-supine;scooting/bridging;rolling right;rolling left  -CZ      Rolling Left [place] (Bed Mobility) supervision  -CZ     Rolling Right [place] (Bed Mobility) supervision  -CZ     Scooting/Bridging [place] (Bed Mobility) supervision;verbal cues  -CZ     Supine-Sit [place] (Bed Mobility) supervision  -CZ     Sit-Supine [place] (Bed Mobility) supervision  -CZ     Assistive Device (Bed Mobility) head of bed elevated;bed rails  -CZ     Row Name 09/08/22 1425          Transfers    Comment, (Transfers) Transfers deferred secondary to gangrenous toes.  -CZ           User Key  (r) = Recorded By, (t) = Taken By, (c) = Cosigned By    Initials Name Provider Type    CZ Christopher Arnett, PT Physical Therapist               Obj/Interventions     Row Name 09/08/22 1425          Range of Motion Comprehensive    General Range of Motion bilateral lower extremity ROM WFL  -CZ     Comment, General Range of Motion Except hip flexion limited by obese abdomen.  -CZ     Row Name 09/08/22 1425          Strength Comprehensive (MMT)    Comment, General Manual Muscle Testing (MMT) Assessment BLEs: 3+/5, grossly.  -CZ     Row Name 09/08/22 1425          Sensory Assessment (Somatosensory)    Sensory Assessment (Somatosensory) other (see comments)  Decreased light touch B feet.  -CZ           User Key  (r) = Recorded By, (t) = Taken By, (c) = Cosigned By    Initials Name Provider Type    CZ Christopher Arnett, PT Physical Therapist               Goals/Plan     Row Name 09/08/22 1425          Bed Mobility Goal 1 (PT)    Activity/Assistive Device (Bed Mobility Goal 1, PT) sit to supine/supine to sit  -CZ     [place] Level/Cues Needed (Bed Mobility Goal 1, PT) independent  -CZ     Time Frame (Bed Mobility Goal 1, PT) by discharge  -CZ     Strategies/Barriers (Bed Mobility Goal 1, PT) HOB flat, no bed rails.  -CZ     Progress/Outcomes (Bed Mobility Goal 1, PT) goal not met  -CZ     Row Name 09/08/22 1425          Transfer Goal 1 (PT)    Activity/Assistive Device (Transfer Goal 1, PT)  sit-to-stand/stand-to-sit;bed-to-chair/chair-to-bed;walker, rolling  -CZ     [place] Level/Cues Needed (Transfer Goal 1, PT) supervision required  -CZ     Time Frame (Transfer Goal 1, PT) by discharge  -CZ     Strategies/Barriers (Transfers Goal 1, PT) Defer until gangrene is addressed.  -CZ     Row Name 09/08/22 1425          Gait Training Goal 1 (PT)    Activity/Assistive Device (Gait Training Goal 1, PT) walker, rolling  -CZ     [place] Level (Gait Training Goal 1, PT) contact guard required  -CZ     Distance (Gait Training Goal 1, PT) 10'x2.  -CZ     Time Frame (Gait Training Goal 1, PT) by discharge  -CZ     Strategies/Barriers (Gait Training Goal 1, PT) Defer until gangrene is addressed.  -CZ     Progress/Outcome (Gait Training Goal 1, PT) goal not met  -CZ     Row Name 09/08/22 1425          ROM Goal 1 (PT)    ROM Goal 1 (PT) Patient will demonstrates (I) with seated and supine ther ex to BLEs, all joints, all planes, 15-20 reps each.  -CZ     Time Frame (ROM Goal 1, PT) by discharge  -CZ     Strategies/Barriers (ROM Goal 1, PT) Gangrenous toes.  -CZ     Progress/Outcome (ROM Goal 1, PT) goal not met  -CZ     Row Name 09/08/22 1425          Therapy Assessment/Plan (PT)    Planned Therapy Interventions (PT) balance training;bed mobility training;gait training;patient/family education;strengthening;stretching;ROM (range of motion);home exercise program  -CZ           User Key  (r) = Recorded By, (t) = Taken By, (c) = Cosigned By    Initials Name Provider Type    CZ Christopher Arnett, PT Physical Therapist               Clinical Impression     Row Name 09/08/22 1425          Pain    Pretreatment Pain Rating 7/10  -CZ     Posttreatment Pain Rating 7/10  -CZ     Pain Location - Side/Orientation Bilateral  -CZ     Pain Location - toe;foot  -CZ     Pain Intervention(s) Repositioned;Distraction;Medication (See MAR)  -CZ     Row Name 09/08/22 1425          Plan of Care Review    Plan of Care Reviewed With  patient  -CZ     Outcome Evaluation Initial PT evaluation complete.  Patient is alert, cooperative with bed mobility but limited by pain.  She transfers supine<-->sit, SPV.  Once EOB, patient able to sit without LOB, educated on seated ther ex.  Transferring to standing and gait deferred until gangrene of toes is addressed. Patient also c/o discomfort on buttocks.  Patient educated on bed mobility technique for pressure relief, able to roll L and R, and to scoot up in bed with SPV.  Patient also educated on bed controls as she reported relief of buttocks pain when height of knee portion of bed was adjusted. RN notified.  Goals established, continue skilled I/P PT.  -     Row Name 09/08/22 1425          Therapy Assessment/Plan (PT)    Rehab Potential (PT) good, to achieve stated therapy goals  -     Criteria for Skilled Interventions Met (PT) yes;skilled treatment is necessary  -CZ     Therapy Frequency (PT) other (see comments)  5-7 days/week  -CZ     Row Name 09/08/22 1425          Positioning and Restraints    Pre-Treatment Position in bed  -CZ     Post Treatment Position bed  -CZ     In Bed supine;call light within reach;encouraged to call for assist;exit alarm on  -CZ           User Key  (r) = Recorded By, (t) = Taken By, (c) = Cosigned By    Initials Name Provider Type    CZ Christopher Arnett, PT Physical Therapist               Outcome Measures     Row Name 09/08/22 1425 09/08/22 0800       How much help from another person do you currently need...    Turning from your back to your side while in flat bed without using bedrails? 3  -CZ 2  -EG    Moving from lying on back to sitting on the side of a flat bed without bedrails? 3  -CZ 2  -EG    Moving to and from a bed to a chair (including a wheelchair)? 3  -CZ 2  -EG    Standing up from a chair using your arms (e.g., wheelchair, bedside chair)? 3  -CZ 2  -EG    Climbing 3-5 steps with a railing? 2  -CZ 2  -EG    To walk in hospital room? 2  -CZ 2  -EG     -PAC 6 Clicks Score (PT) 16  -CZ 12  -EG    Highest level of mobility 5 --> Static standing  -CZ 4 --> Transferred to chair/commode  -EG    Row Name 09/08/22 1425          Functional Assessment    Outcome Measure Options -PAC 6 Clicks Basic Mobility (PT)  -           User Key  (r) = Recorded By, (t) = Taken By, (c) = Cosigned By    Initials Name Provider Type    CZ Christopher Arnett, PT Physical Therapist    Selena Herr RN Registered Nurse                             Physical Therapy Education                 Title: PT OT SLP Therapies (In Progress)     Topic: Physical Therapy (In Progress)     Point: Mobility training (In Progress)     Learning Progress Summary           Patient Acceptance, E, NR by  at 9/8/2022 4804    Comment: PT POC, HEP, hand placement with transfers.                   Point: Home exercise program (Not Started)     Learner Progress:  Not documented in this visit.          Point: Body mechanics (Not Started)     Learner Progress:  Not documented in this visit.          Point: Precautions (Not Started)     Learner Progress:  Not documented in this visit.                      User Key     Initials Effective Dates Name Provider Type Discipline     06/16/21 -  Christopher Arnett, PT Physical Therapist PT              PT Recommendation and Plan  Planned Therapy Interventions (PT): balance training, bed mobility training, gait training, patient/family education, strengthening, stretching, ROM (range of motion), home exercise program  Plan of Care Reviewed With: patient  Outcome Evaluation: Initial PT evaluation complete.  Patient is alert, cooperative with bed mobility but limited by pain.  She transfers supine<-->sit, SPV.  Once EOB, patient able to sit without LOB, educated on seated ther ex.  Transferring to standing and gait deferred until gangrene of toes is addressed. Patient also c/o discomfort on buttocks.  Patient educated on bed mobility technique for pressure relief, able to  roll L and R, and to scoot up in bed with SPV.  Patient also educated on bed controls as she reported relief of buttocks pain when height of knee portion of bed was adjusted. RN notified.  Goals established, continue skilled I/P PT.     Time Calculation:    PT Charges     Row Name 09/08/22 1519 09/08/22 1057          Time Calculation    Start Time 1425  -CZ --     Stop Time 1505  -CZ --     Time Calculation (min) 40 min  -CZ --     PT Received On 09/08/22  -CZ --     PT - Next Appointment -- 09/09/22  -CZ     PT Goal Re-Cert Due Date 09/21/22  -CZ --            Untimed Charges    PT Eval/Re-eval Minutes 40  -CZ --            Total Minutes    Untimed Charges Total Minutes 40  -CZ --      Total Minutes 40  -CZ --           User Key  (r) = Recorded By, (t) = Taken By, (c) = Cosigned By    Initials Name Provider Type    CZ Christopher Arnett, PT Physical Therapist              Therapy Charges for Today     Code Description Service Date Service Provider Modifiers Qty    [id number] HC PT EVAL MOD COMPLEXITY 3 9/8/2022 Christopher Arnett, PT GP 1          PT G-Codes  Outcome Measure Options: AM-PAC 6 Clicks Basic Mobility (PT)  AM-PAC 6 Clicks Score (PT): 16    Christopher Arnett PT  9/8/2022

## 2022-09-08 NOTE — PROGRESS NOTES
"  [facility]  [address] [place] [address]. [id number]  T - [phone]  F - [phone]     Progress Note          PATIENT  DEMOGRAPHICS   PATIENT NAME: Yamileth Slater                      PHYSICIAN: Ace Lauren MD  : 1960  MRN: [id number]   LOS: 1 day    Patient Care Team:  Rianna Macias MD as PCP - General (Family Medicine)  Subjective   SUBJECTIVE   More alert. No soa       Objective   OBJECTIVE   Vital Signs  Temp:  [96.4 °F (35.8 °C)-96.8 °F (36 °C)] 96.4 °F (35.8 °C)  Heart Rate:  [61-81] 81  Resp:  [16-18] 18  BP: (100-163)/(59-77) 159/77    Flowsheet Rows    Flowsheet Row First Filed Value   Admission Height 157.5 cm (62\") Documented at 2022   Admission Weight 115 kg (254 lb) Documented at 2022           I/O last 3 completed shifts:  In: 1520 [P.O.:1420; IV Piggyback:100]  Out: 3450 [Urine:1450; Other:2000]    PHYSICAL EXAM    Physical Exam  Constitutional:       Appearance: She is well-developed.   HENT:      Head: Normocephalic.   Eyes:      Pupils: Pupils are equal, round, and reactive to light.   Cardiovascular:      Rate and Rhythm: Normal rate and regular rhythm.      Heart sounds: Normal heart sounds.   Pulmonary:      Effort: Pulmonary effort is normal.      Breath sounds: Normal breath sounds.   Abdominal:      General: Bowel sounds are normal.      Palpations: Abdomen is soft.   Skin:     Coloration: Skin is not jaundiced.   Neurological:      General: No focal deficit present.      Mental Status: She is alert and oriented to person, place, and time.         RESULTS   Results Review:    Results from last 7 days   Lab Units 22  0522 22  0650 22   SODIUM mmol/L 136 130* 129*   POTASSIUM mmol/L 3.4* 3.7 3.7   CHLORIDE mmol/L 97* 92* 89*   CO2 mmol/L 31.0* 28.0 28.0   BUN mg/dL 18 42* 43*   CREATININE mg/dL 1.75* 3.19* 3.18*   CALCIUM mg/dL 8.7 8.8 9.1   BILIRUBIN mg/dL 0.3 0.4 0.4   ALK PHOS U/L 139* 172* 194*   ALT " (SGPT) U/L <5 <5 5   AST (SGOT) U/L 8 7 9   GLUCOSE mg/dL 70 452* 549*       Estimated Creatinine Clearance: 39.7 mL/min (A) (by C-G formula based on SCr of 1.75 mg/dL (H)).                Results from last 7 days   Lab Units 09/08/22  0522 09/07/22  0651 09/06/22  2058   WBC 10*3/mm3 10.01 12.47* 14.21*   HEMOGLOBIN g/dL 9.5* 9.7* 10.4*   PLATELETS 10*3/mm3 247 230 224       Results from last 7 days   Lab Units 09/07/22  0650   INR  1.25*         Imaging Results (Last 24 Hours)     Procedure Component Value Units Date/Time    US Arterial Doppler Lower Extremity Complete [[id number]] Collected: 09/07/22 2045     Updated: 09/07/22 2355    Narrative:      INDICATION: Ulcers on toes bilateral, possible gangrene, A41.9  Sepsis, unspecified organism E11.621 Type 2 diabetes mellitus  with foot ulcer L97.509 Non-pressure chronic ulcer of other part  of unspecified foot with unspecified severity R73.9  Hyperglycemia, unspecified    EXAMINATION/TECHNIQUE: Duplex and color Doppler imaging of both  lower extremities.    COMPARISON: None.  ____________________________________________    FINDINGS:    Right leg:  Pulsatile monophasic waveforms are seen within the common  femoral, profunda femoral, and superficial femoral arteries.  There is spectral broadening distally. There is no occlusion or  thrombosis. Popliteal artery is patent with monophasic waveforms.  In the proximal popliteal artery are slightly elevated velocities  suggesting a moderate stenosis. Monophasic waveforms are seen  within the right calf.    Left leg:  Pulsatile monophasic waveforms are seen within the common  femoral, profunda femoral, and superficial femoral arteries.  There is no occlusion. There is no evidence of any high-grade  stenosis. Waveforms in the popliteal artery are monophasic and  slightly blunted. Monophasic waveforms are seen throughout the  left calf. The distal anterior tibial artery is obscured by  dressings      Impression:      Moderate  diffuse atherosclerotic irregularity with no evidence of  any significant femoral or popliteal artery stenosis or  occlusion. There appears to be three-vessel runoff bilaterally    Electronically signed by:  Pablo Rubio MD  9/7/2022 11:52 PM CDT  Workstation: [id number]    MRI Foot Right Without Contrast [[id number]] Collected: 09/07/22 1043     Updated: 09/07/22 1311    Narrative:      Comparison:  9/6/2022    Indication:  Right foot ulcer. Type 2 diabetes. Rule out  osteomyelitis.    Technique:  Magnetic resonance imaging of the right foot was  performed without intravenous contrast, utilizing routine  sequences.  Every series is degraded by patient motion.    Findings:    First ray:  There is normal bone configuration. There appears to  be bone marrow edema of the proximal phalanx.    Second ray:  There is normal bone configuration.  There is normal  bone marrow signal.  No evidence of fracture or osteomyelitis.    Third ray:  There is normal bone configuration. There appears to  be bone marrow edema of the phalanges.    Fourth ray:  There is normal bone configuration.  There is normal  bone marrow signal.  No evidence of fracture or osteomyelitis.    Fifth ray:  There is normal bone configuration.  There is normal  bone marrow signal.  No evidence of fracture or osteomyelitis.    Additional joints:  The visualized joints of the hindfoot and  midfoot appear grossly intact.  There are mild degenerative  changes.      Bursae and soft tissues: There is muscle fatty atrophy.    Subcutaneous edema of the first and second toes likely  cellulitis.    Other:      Impression:      Impression:  1. Severe motion degradation. The technologist notes the patient  was repeatedly asked to remain still. Multiple sequences are  repeated. Motion decreases sensitivity and specificity of the  exam.  2. There appears to be mild bone marrow edema involving the  phalanges of the first and second digits without clear  accompanying T1 signal  changes. Findings are indeterminate for  osteomyelitis. Subcutaneous edema of the first and second toes  likely cellulitis. Limited sensitivity for abscess. Repeat MRI  examination with the addition of gadolinium contrast when the  patient is able to hold still, or three-phase bone scan may be  considered if increased sensitivity and specificity is desired.  3. Muscle fatty atrophy suggesting presence of a polyneuropathy.    Electronically signed by:  Froylan Bennett MD  9/7/2022 1:09 PM CDT  Workstation: [id number]           MEDICATIONS    atorvastatin, 20 mg, Oral, Daily  bumetanide, 2 mg, Oral, Once per day on Sun Tue Thu Sat  cetirizine, 5 mg, Oral, Daily  collagenase, 1 application, Topical, Daily  epoetin hubert/hubert-epbx, 6,000 Units, Intravenous, Once per day on Mon Wed Fri  gabapentin, 300 mg, Oral, Daily  hydrocortisone-bacitracin-zinc oxide-nystatin, 1 application, Topical, BID  Insulin Aspart, 0-24 Units, Subcutaneous, TID AC  Insulin Aspart, 30 Units, Subcutaneous, TID With Meals  insulin detemir, 30 Units, Subcutaneous, Q12H  ipratropium-albuterol, 3 mL, Nebulization, 4x Daily - RT  isosorbide mononitrate, 30 mg, Oral, QAM  levothyroxine, 25 mcg, Oral, Daily  lisinopril, 20 mg, Oral, Daily  metoprolol tartrate, 25 mg, Oral, Q12H  montelukast, 10 mg, Oral, Nightly  nystatin, , Topical, Q12H  oxybutynin XL, 5 mg, Oral, Daily  pantoprazole, 40 mg, Oral, Nightly  PATIENT SUPPLIED MEDICATION, 7 mg, Oral, Daily  piperacillin-tazobactam, 4.5 g, Intravenous, Q12H  ranolazine, 500 mg, Oral, Q12H  senna-docusate sodium, 2 tablet, Oral, BID  sevelamer, 800 mg, Oral, TID With Meals  sodium chloride, 10 mL, Intravenous, Q12H      Pharmacy to dose vancomycin,   Pharmacy to Dose Zosyn,         Assessment & Plan   ASSESSMENT / PLAN      Gangrene of both feet (HCC)    Mixed hyperlipidemia    GERD (gastroesophageal reflux disease)    Hypertension    Coronary artery disease involving native coronary artery of native heart without  angina pectoris    Hypothyroidism    MIKE and COPD overlap syndrome (HCC)    Cellulitis of foot associated with diabetes mellitus (HCC)    Type 2 diabetes mellitus with autonomic neuropathy (HCC)    Physical deconditioning    ESRD on hemodialysis (HCC)    Anemia       1. ESRD on HD MWF:  - Dialysis dependent since October 2021.  On dialysis at [facility].   - Plan HD next tomorrow     2. Anemia of chronic kidney disease:  - Patient had extensive work-up in the past by Dr. Munoz. She also has B12 deficiency. She has history of AVM.    - Hemoglobin is acceptable      3. Hypertension:  - Well controlled     4. Sepsis/Gangrene:  - Manage per primary team. Dr Oliveira is on board, MRI indeterminate finding     5. Diabetes type 2:  - Management per primary team.      6. Coronary artery disease     7. Chronic kidney disease/mineral and bone disorder:  - On sevelamer                      This document has been electronically signed by Ace Lauren MD on September 8, 2022 11:55 CDT

## 2022-09-08 NOTE — SIGNIFICANT NOTE
09/08/22 1057   OTHER   Discipline physical therapist;occupational therapist   Rehab Time/Intention   Session Not Performed patient/family declined evaluation   Recommendation   PT - Next Appointment 09/09/22   Recommendation   OT - Next Appointment 09/09/22     Initial PT/OT evaluations attempted.  Patient reports pain in the toes of both her feet.  She reports she has a procedure today for her toes and doesn't want to get out of bed until after her procedure. RN updated.

## 2022-09-08 NOTE — PROGRESS NOTES
"Pharmacokinetics by Pharmacy - Vancomycin    Yamileth Slater is a 62 y.o. female receiving vancomycin pulse doses on hemodialysis for cellulitis and gangrene of both feet.    Patient is also receiving zosyn.     Objective:  [Ht: 157.5 cm (62\"); Wt: 116 kg (255 lb)]     WBC   Date Value Ref Range Status   09/08/2022 10.01 3.40 - 10.80 10*3/mm3 Final   09/07/2022 12.47 (H) 3.40 - 10.80 10*3/mm3 Final   09/06/2022 14.21 (H) 3.40 - 10.80 10*3/mm3 Final      C-Reactive Protein   Date Value Ref Range Status   09/07/2022 34.10 (H) 0.00 - 0.50 mg/dL Final   09/06/2022 32.67 (H) 0.00 - 0.50 mg/dL Final     Lactate   Date Value Ref Range Status   09/06/2022 1.6 0.5 - 2.0 mmol/L Final      Temp Readings from Last 1 Encounters:   09/08/22 96.4 °F (35.8 °C) (Oral)     Estimated Creatinine Clearance: 39.7 mL/min (A) (by C-G formula based on SCr of 1.75 mg/dL (H)).   Creatinine   Date Value Ref Range Status   09/08/2022 1.75 (H) 0.57 - 1.00 mg/dL Final   09/07/2022 3.19 (H) 0.57 - 1.00 mg/dL Final   09/06/2022 3.18 (H) 0.57 - 1.00 mg/dL Final       Vancomycin Random   Date Value Ref Range Status   09/07/2022 18.00 5.00 - 40.00 mcg/mL Final       Culture Results:  Microbiology Results (last 10 days)     Procedure Component Value - Date/Time    Blood Culture - Blood, Arm, Left [[id number]]  (Normal) Collected: 09/06/22 2058    Lab Status: Preliminary result Specimen: Blood from Arm, Left Updated: 09/07/22 2117     Blood Culture No growth at 24 hours        No results found for: RESPCX      Assessment:  Random Level 9/7: 18.00 mcg/ml at 1325   Blood culture 9/6: No growth at 24 hours       Afebrile  WBC WNL, down trending      Patient is currently on MWF dialysis.     Plan:  1. Continue vancomycin pulse dosing. No additional doses needed at this time.   2. Ordered vancomycin random level for 0600 every MWF before dialysis.   3. Pharmacy will monitor renal function and adjust dose accordingly.      Loly Fang, [facility]   09/08/22 " 11:22 CDT

## 2022-09-08 NOTE — PLAN OF CARE
Goal Outcome Evaluation:  Plan of Care Reviewed With: patient           Outcome Evaluation: OT eval complete, bed eval only, podiatry following for foot wounds. Treated NWB yaz LE with upgrades as WB as per podiatry recs. Supine<>sit with supervision. Static and dynamic sitting good, no LOB noted. Patient able to wash hands/face and eat ice chips EOB with good sitiing balance. Patien tolerated 15 mins of static sitting, returned to bed. Patient sidelying R at end of session, heels elevated. Patient with decreased activity tolerance, decreased independence in ADLs, and decreased safety in transfers. Cont inpatient OT. Dispo TBD pending podiatry interventions.

## 2022-09-09 VITALS
WEIGHT: 257.1 LBS | TEMPERATURE: 96.3 F | RESPIRATION RATE: 18 BRPM | HEART RATE: 72 BPM | HEIGHT: 62 IN | OXYGEN SATURATION: 96 % | BODY MASS INDEX: 47.31 KG/M2 | DIASTOLIC BLOOD PRESSURE: 64 MMHG | SYSTOLIC BLOOD PRESSURE: 155 MMHG

## 2022-09-09 LAB
ALBUMIN SERPL-MCNC: 2.6 G/DL (ref 3.5–5.2)
ALBUMIN/GLOB SERPL: 0.6 G/DL
ALP SERPL-CCNC: 158 U/L (ref 39–117)
ALT SERPL W P-5'-P-CCNC: <5 U/L (ref 1–33)
ANION GAP SERPL CALCULATED.3IONS-SCNC: 10 MMOL/L (ref 5–15)
AST SERPL-CCNC: 6 U/L (ref 1–32)
BASOPHILS # BLD AUTO: 0.08 10*3/MM3 (ref 0–0.2)
BASOPHILS NFR BLD AUTO: 0.7 % (ref 0–1.5)
BILIRUB SERPL-MCNC: 0.3 MG/DL (ref 0–1.2)
BUN SERPL-MCNC: 24 MG/DL (ref 8–23)
BUN/CREAT SERPL: 8.9 (ref 7–25)
CALCIUM SPEC-SCNC: 8.5 MG/DL (ref 8.6–10.5)
CHLORIDE SERPL-SCNC: 92 MMOL/L (ref 98–107)
CO2 SERPL-SCNC: 29 MMOL/L (ref 22–29)
CREAT SERPL-MCNC: 2.71 MG/DL (ref 0.57–1)
CRP SERPL-MCNC: 18.68 MG/DL (ref 0–0.5)
D-LACTATE SERPL-SCNC: 1.5 MMOL/L (ref 0.5–2)
DEPRECATED RDW RBC AUTO: 51.1 FL (ref 37–54)
EGFRCR SERPLBLD CKD-EPI 2021: 19.2 ML/MIN/1.73
EOSINOPHIL # BLD AUTO: 0.19 10*3/MM3 (ref 0–0.4)
EOSINOPHIL NFR BLD AUTO: 1.7 % (ref 0.3–6.2)
ERYTHROCYTE [DISTWIDTH] IN BLOOD BY AUTOMATED COUNT: 16 % (ref 12.3–15.4)
ERYTHROCYTE [SEDIMENTATION RATE] IN BLOOD: 100 MM/HR (ref 0–30)
GLOBULIN UR ELPH-MCNC: 4.5 GM/DL
GLUCOSE BLDC GLUCOMTR-MCNC: 141 MG/DL (ref 70–130)
GLUCOSE BLDC GLUCOMTR-MCNC: 205 MG/DL (ref 70–130)
GLUCOSE BLDC GLUCOMTR-MCNC: 295 MG/DL (ref 70–130)
GLUCOSE BLDC GLUCOMTR-MCNC: 84 MG/DL (ref 70–130)
GLUCOSE SERPL-MCNC: 278 MG/DL (ref 65–99)
HCT VFR BLD AUTO: 31.7 % (ref 34–46.6)
HGB BLD-MCNC: 9.8 G/DL (ref 12–15.9)
IMM GRANULOCYTES # BLD AUTO: 0.08 10*3/MM3 (ref 0–0.05)
IMM GRANULOCYTES NFR BLD AUTO: 0.7 % (ref 0–0.5)
LYMPHOCYTES # BLD AUTO: 1.26 10*3/MM3 (ref 0.7–3.1)
LYMPHOCYTES NFR BLD AUTO: 11.4 % (ref 19.6–45.3)
MCH RBC QN AUTO: 26.8 PG (ref 26.6–33)
MCHC RBC AUTO-ENTMCNC: 30.9 G/DL (ref 31.5–35.7)
MCV RBC AUTO: 86.6 FL (ref 79–97)
MONOCYTES # BLD AUTO: 0.82 10*3/MM3 (ref 0.1–0.9)
MONOCYTES NFR BLD AUTO: 7.4 % (ref 5–12)
NEUTROPHILS NFR BLD AUTO: 78.1 % (ref 42.7–76)
NEUTROPHILS NFR BLD AUTO: 8.58 10*3/MM3 (ref 1.7–7)
NRBC BLD AUTO-RTO: 0 /100 WBC (ref 0–0.2)
PLATELET # BLD AUTO: 286 10*3/MM3 (ref 140–450)
PMV BLD AUTO: 8.8 FL (ref 6–12)
POTASSIUM SERPL-SCNC: 3.6 MMOL/L (ref 3.5–5.2)
PROT SERPL-MCNC: 7.1 G/DL (ref 6–8.5)
RBC # BLD AUTO: 3.66 10*6/MM3 (ref 3.77–5.28)
SODIUM SERPL-SCNC: 131 MMOL/L (ref 136–145)
VANCOMYCIN SERPL-MCNC: 20.1 MCG/ML (ref 5–40)
WBC NRBC COR # BLD: 11.01 10*3/MM3 (ref 3.4–10.8)

## 2022-09-09 PROCEDURE — 25010000002 ONDANSETRON PER 1 MG: Performed by: STUDENT IN AN ORGANIZED HEALTH CARE EDUCATION/TRAINING PROGRAM

## 2022-09-09 PROCEDURE — 63710000001 INSULIN ASPART PER 5 UNITS: Performed by: STUDENT IN AN ORGANIZED HEALTH CARE EDUCATION/TRAINING PROGRAM

## 2022-09-09 PROCEDURE — 25010000002 HEPARIN (PORCINE) PER 1000 UNITS: Performed by: INTERNAL MEDICINE

## 2022-09-09 PROCEDURE — 25010000002 EPOETIN ALFA PER 1000 UNITS: Performed by: INTERNAL MEDICINE

## 2022-09-09 PROCEDURE — 86140 C-REACTIVE PROTEIN: CPT | Performed by: STUDENT IN AN ORGANIZED HEALTH CARE EDUCATION/TRAINING PROGRAM

## 2022-09-09 PROCEDURE — 82962 GLUCOSE BLOOD TEST: CPT

## 2022-09-09 PROCEDURE — 94760 N-INVAS EAR/PLS OXIMETRY 1: CPT

## 2022-09-09 PROCEDURE — 83605 ASSAY OF LACTIC ACID: CPT | Performed by: STUDENT IN AN ORGANIZED HEALTH CARE EDUCATION/TRAINING PROGRAM

## 2022-09-09 PROCEDURE — 94799 UNLISTED PULMONARY SVC/PX: CPT

## 2022-09-09 PROCEDURE — 25010000002 PIPERACILLIN SOD-TAZOBACTAM PER 1 G: Performed by: FAMILY MEDICINE

## 2022-09-09 PROCEDURE — 97535 SELF CARE MNGMENT TRAINING: CPT

## 2022-09-09 PROCEDURE — 97110 THERAPEUTIC EXERCISES: CPT

## 2022-09-09 PROCEDURE — 80053 COMPREHEN METABOLIC PANEL: CPT | Performed by: STUDENT IN AN ORGANIZED HEALTH CARE EDUCATION/TRAINING PROGRAM

## 2022-09-09 PROCEDURE — 87040 BLOOD CULTURE FOR BACTERIA: CPT | Performed by: STUDENT IN AN ORGANIZED HEALTH CARE EDUCATION/TRAINING PROGRAM

## 2022-09-09 PROCEDURE — 85652 RBC SED RATE AUTOMATED: CPT | Performed by: STUDENT IN AN ORGANIZED HEALTH CARE EDUCATION/TRAINING PROGRAM

## 2022-09-09 PROCEDURE — 80202 ASSAY OF VANCOMYCIN: CPT | Performed by: FAMILY MEDICINE

## 2022-09-09 PROCEDURE — 85025 COMPLETE CBC W/AUTO DIFF WBC: CPT | Performed by: STUDENT IN AN ORGANIZED HEALTH CARE EDUCATION/TRAINING PROGRAM

## 2022-09-09 RX ORDER — MORPHINE SULFATE 2 MG/ML
1 INJECTION, SOLUTION INTRAMUSCULAR; INTRAVENOUS EVERY 4 HOURS PRN
Refills: 0
Start: 2022-09-09 | End: 2022-09-13

## 2022-09-09 RX ORDER — ASPIRIN 81 MG/1
81 TABLET ORAL DAILY
Status: DISCONTINUED | OUTPATIENT
Start: 2022-09-10 | End: 2022-09-10 | Stop reason: HOSPADM

## 2022-09-09 RX ORDER — GABAPENTIN 300 MG/1
300 CAPSULE ORAL 3 TIMES WEEKLY
Status: DISCONTINUED | OUTPATIENT
Start: 2022-09-09 | End: 2022-09-10 | Stop reason: HOSPADM

## 2022-09-09 RX ORDER — DEXTROSE MONOHYDRATE 25 G/50ML
25 INJECTION, SOLUTION INTRAVENOUS
Qty: 50 ML | Refills: 0
Start: 2022-09-09 | End: 2022-12-13

## 2022-09-09 RX ORDER — NALOXONE HCL 0.4 MG/ML
0.4 VIAL (ML) INJECTION
Start: 2022-09-09 | End: 2022-12-13

## 2022-09-09 RX ADMIN — CETIRIZINE HYDROCHLORIDE 5 MG: 5 TABLET ORAL at 08:20

## 2022-09-09 RX ADMIN — LISINOPRIL 20 MG: 20 TABLET ORAL at 08:30

## 2022-09-09 RX ADMIN — INSULIN ASPART 12 UNITS: 100 INJECTION, SOLUTION INTRAVENOUS; SUBCUTANEOUS at 08:17

## 2022-09-09 RX ADMIN — PIPERACILLIN SODIUM AND TAZOBACTAM SODIUM 4.5 G: 4; .5 INJECTION, POWDER, LYOPHILIZED, FOR SOLUTION INTRAVENOUS at 00:44

## 2022-09-09 RX ADMIN — Medication 10 ML: at 08:21

## 2022-09-09 RX ADMIN — IPRATROPIUM BROMIDE AND ALBUTEROL SULFATE 3 ML: 2.5; .5 SOLUTION RESPIRATORY (INHALATION) at 06:47

## 2022-09-09 RX ADMIN — ASPIRIN 324 MG: 81 TABLET, CHEWABLE ORAL at 00:45

## 2022-09-09 RX ADMIN — ORAL SEMAGLUTIDE 7 MG: 7 TABLET ORAL at 08:30

## 2022-09-09 RX ADMIN — SEVELAMER CARBONATE 800 MG: 800 TABLET, FILM COATED ORAL at 11:39

## 2022-09-09 RX ADMIN — CLOPIDOGREL BISULFATE 75 MG: 75 TABLET ORAL at 08:20

## 2022-09-09 RX ADMIN — Medication 10 ML: at 08:22

## 2022-09-09 RX ADMIN — ONDANSETRON 4 MG: 2 INJECTION INTRAMUSCULAR; INTRAVENOUS at 04:10

## 2022-09-09 RX ADMIN — IPRATROPIUM BROMIDE AND ALBUTEROL SULFATE 3 ML: 2.5; .5 SOLUTION RESPIRATORY (INHALATION) at 10:49

## 2022-09-09 RX ADMIN — DOCUSATE SODIUM 50 MG AND SENNOSIDES 8.6 MG 2 TABLET: 8.6; 5 TABLET, FILM COATED ORAL at 08:20

## 2022-09-09 RX ADMIN — POVIDONE-IODINE: 10 SOLUTION TOPICAL at 08:21

## 2022-09-09 RX ADMIN — NYSTATIN: 100000 POWDER TOPICAL at 08:21

## 2022-09-09 RX ADMIN — LEVOTHYROXINE SODIUM 25 MCG: 25 TABLET ORAL at 08:20

## 2022-09-09 RX ADMIN — EPOETIN ALFA 6000 UNITS: 10000 SOLUTION INTRAVENOUS; SUBCUTANEOUS at 13:45

## 2022-09-09 RX ADMIN — GABAPENTIN 300 MG: 300 CAPSULE ORAL at 17:41

## 2022-09-09 RX ADMIN — IPRATROPIUM BROMIDE AND ALBUTEROL SULFATE 3 ML: 2.5; .5 SOLUTION RESPIRATORY (INHALATION) at 21:29

## 2022-09-09 RX ADMIN — PIPERACILLIN SODIUM AND TAZOBACTAM SODIUM 4.5 G: 4; .5 INJECTION, POWDER, LYOPHILIZED, FOR SOLUTION INTRAVENOUS at 11:39

## 2022-09-09 RX ADMIN — SEVELAMER CARBONATE 800 MG: 800 TABLET, FILM COATED ORAL at 08:22

## 2022-09-09 RX ADMIN — COLLAGENASE SANTYL 1 APPLICATION: 250 OINTMENT TOPICAL at 08:21

## 2022-09-09 RX ADMIN — METOPROLOL TARTRATE 25 MG: 25 TABLET, FILM COATED ORAL at 08:20

## 2022-09-09 RX ADMIN — ATORVASTATIN CALCIUM 20 MG: 20 TABLET, FILM COATED ORAL at 08:20

## 2022-09-09 RX ADMIN — INSULIN ASPART 8 UNITS: 100 INJECTION, SOLUTION INTRAVENOUS; SUBCUTANEOUS at 11:39

## 2022-09-09 RX ADMIN — RANOLAZINE 500 MG: 500 TABLET, FILM COATED, EXTENDED RELEASE ORAL at 08:20

## 2022-09-09 RX ADMIN — HEPARIN SODIUM 4000 UNITS: 1000 INJECTION INTRAVENOUS; SUBCUTANEOUS at 13:46

## 2022-09-09 RX ADMIN — ACETAMINOPHEN 650 MG: 325 TABLET, FILM COATED ORAL at 08:20

## 2022-09-09 RX ADMIN — ISOSORBIDE MONONITRATE 30 MG: 30 TABLET, EXTENDED RELEASE ORAL at 06:41

## 2022-09-09 RX ADMIN — OXYBUTYNIN CHLORIDE 5 MG: 5 TABLET, EXTENDED RELEASE ORAL at 08:20

## 2022-09-09 RX ADMIN — GABAPENTIN 300 MG: 300 CAPSULE ORAL at 08:20

## 2022-09-09 NOTE — CONSULTS
Adult Nutrition  Assessment    Patient Name:  Yamileth Slater  YOB: 1960  MRN: [id number]  Admit Date:  9/6/2022    Assessment Date:  9/9/2022    Comments:  RD f/u. Pt continues w/therapies r/t gangrenous toes. Pt w/overall good po intake. She says her appetite is OK. Wt/labs reviewed. Says she plans to transfer out. No prefs/requests voiced. RD offered ADA diet ed but pt declines.                       Electronically signed by:  Charlene Escalera RD  09/09/22 12:58 CDT

## 2022-09-09 NOTE — SIGNIFICANT NOTE
09/09/22 1544   OTHER   Discipline physical therapy assistant   Rehab Time/Intention   Session Not Performed patient unavailable for treatment  (pt is to t/f to another facility after dialysis. no PT tx at this time.)

## 2022-09-09 NOTE — PAYOR COMM NOTE
"    Karmen Colorado RN [facility]  [phone]      Phone  [phone]      Fax  Cont Stay REview      BryonYamileth Nga (62 y.o. Female)             Date of Birth   1960    Social Security Number       Address   [address] [place] [address]    Home Phone   [phone]    MRN   [id number]       Religious   None    Marital Status                               Admission Date   9/6/22    Admission Type   Emergency    Admitting Provider   Kit Brar MD    Attending Provider   Rianna Macias MD    Department, Room/Bed   [facility] 4 [place], 403/1       Discharge Date       Discharge Disposition       Discharge Destination                               Attending Provider: Rianna Macias MD    Allergies: Adhesive Tape, Nsaids, Latex, Other    Isolation: Contact   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), 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)   Code Status: CPR   Advance Care Planning Activity    Ht: 157.5 cm (62\")   Wt: 117 kg (257 lb 1.6 oz)    Admission Cmt: None   Principal Problem: Gangrene of both feet (HCC) [I96]                 Active Insurance as of 9/6/2022     Primary Coverage     Payor Plan Insurance Group Employer/Plan Group    ANTHEM MEDICARE REPLACEMENT ANTHEM MEDICARE ADVANTAGE [id number]     Payor Plan Address Payor Plan Phone Number Payor Plan Fax Number Effective Dates    [address] [phone]  1/1/2022 - None Entered    [facility] [id number]       Subscriber Name Subscriber Birth Date Member ID       YAMILETH FINK NGA 1960 [id number]           Secondary Coverage     Payor Plan Insurance Group Employer/Plan Group    [place]      Payor Plan Address Payor Plan Phone Number Payor Plan Fax Number Effective Dates    [address] [phone]  6/28/2019 - None Entered    [facility] [id number]       Subscriber Name Subscriber Birth Date Member ID       YAMILETH FINK NGA 1960 [id number] "                 Emergency Contacts      (Rel.) Home Phone Work Phone Mobile Phone    Huy Slater (Spouse) [phone] -- [phone]    Yamileth Chavez (Daughter) [phone] -- [phone]    Suzanne Rivera (Daughter) [phone] -- [phone]            Vital Signs (last day)     Date/Time Temp Temp src Pulse Resp BP Patient Position SpO2    09/09/22 1113 96.8 (36) Axillary 71 20 99/58 Lying 97    09/09/22 1055 -- -- 77 20 -- -- --    09/09/22 1049 -- -- 79 20 -- -- 95    09/09/22 0712 98.2 (36.8) Axillary 77 20 171/77 Lying 94    09/09/22 0656 -- -- 79 20 -- -- --    09/09/22 0647 -- -- 78 20 -- -- 90    09/09/22 0640 -- -- -- -- 168/98 -- --    09/09/22 0350 99.2 (37.3) Axillary 77 20 106/59 Lying 91    09/08/22 2051 97.1 (36.2) Oral 83 20 173/74 Lying 96    09/08/22 2020 -- -- 80 -- -- -- --    09/08/22 2014 -- -- 83 20 -- -- 96    09/08/22 1512 -- -- 81 18 -- -- 100    09/08/22 1505 -- -- 79 18 -- -- 97    09/08/22 1504 96.6 (35.9) -- 79 18 85/48 Lying 95    09/08/22 1054 -- -- 81 18 -- -- 100    09/08/22 1047 -- -- 77 18 -- -- 99    09/08/22 0800 -- -- 79 18 -- -- 100    09/08/22 0753 -- -- 78 18 -- -- 98    09/08/22 0700 96.4 (35.8) Oral 75 18 159/77 Lying 99    09/08/22 0250 96.6 (35.9) Axillary 64 18 100/59 Lying 99          Oxygen Therapy (last day)     Date/Time SpO2 Device (Oxygen Therapy) Flow (L/min) Oxygen Concentration (%) ETCO2 (mmHg)    09/09/22 1113 97 nasal cannula 2 -- --    09/09/22 1049 95 nasal cannula 2 -- --    09/09/22 0800 -- nasal cannula 2 -- --    09/09/22 0712 94 nasal cannula 2 -- --    09/09/22 0647 90 nasal cannula 2 -- --    09/09/22 0350 91 nasal cannula 2 -- --    09/08/22 2313 -- other (see comments) 2 -- --    09/08/22 2051 96 nasal cannula 2 -- --    09/08/22 2020 -- nasal cannula 2 -- --    09/08/22 2014 96 nasal cannula 2 -- --    09/08/22 2000 -- nasal cannula 2 -- --    09/08/22 1512 100 nasal cannula 2 -- --    09/08/22 1505 97 nasal cannula 2 -- --     09/08/22 1504 95 nasal cannula 2 -- --    09/08/22 1054 100 nasal cannula 2 -- --    09/08/22 1047 99 nasal cannula 2 -- --    09/08/22 0800 100 nasal cannula 2 -- --    09/08/22 0753 98 nasal cannula 2 -- --    09/08/22 0700 99 nasal cannula 2 -- --    09/08/22 0250 99 other (see comments) 2 -- --          Current Facility-Administered Medications   Medication Dose Route Frequency Provider Last Rate Last Admin   • acetaminophen (TYLENOL) tablet 650 mg  650 mg Oral Q6H PRN Jerman Coffman MD   650 mg at 09/09/22 0820   • albumin human 25 % IV SOLN 12.5 g  12.5 g Intravenous PRN Ace Lauren MD       • albuterol (PROVENTIL) nebulizer solution 0.083% 2.5 mg/3mL  2.5 mg Nebulization Q4H PRN Jerman Coffman MD       • [START ON 9/10/2022] aspirin EC tablet 81 mg  81 mg Oral Daily Rianna Macias MD       • atorvastatin (LIPITOR) tablet 20 mg  20 mg Oral Daily Jerman Coffman MD   20 mg at 09/09/22 0820   • sennosides-docusate (PERICOLACE) 8.6-50 MG per tablet 2 tablet  2 tablet Oral BID Jerman Coffman MD   2 tablet at 09/09/22 0820    And   • polyethylene glycol (MIRALAX) packet 17 g  17 g Oral Daily PRN Jerman Coffman MD        And   • bisacodyl (DULCOLAX) EC tablet 5 mg  5 mg Oral Daily PRN Jerman Coffman MD        And   • bisacodyl (DULCOLAX) suppository 10 mg  10 mg Rectal Daily PRN Jerman Coffman MD       • bumetanide (BUMEX) tablet 2 mg  2 mg Oral Once per day on Sun Tue Thu Sat Jerman Coffman MD   2 mg at 09/08/22 0853   • Capsaicin 0.1 % cream 3 application  3 g Apply externally TID PRN Jerman Coffman MD       • cetirizine (zyrTEC) tablet 5 mg  5 mg Oral Daily Jerman Coffman MD   5 mg at 09/09/22 0820   • clopidogrel (PLAVIX) tablet 75 mg  75 mg Oral Daily Rianna Macias MD   75 mg at 09/09/22 0820   • collagenase ointment 1 application  1 application Topical Daily Jerman Coffman MD   1 application at 09/09/22 0821   • dextrose (D50W) (25 g/50 mL) IV injection 25 g  25 g Intravenous Q15  Min PRN Jerman Coffman MD       • dextrose (GLUTOSE) oral gel 15 g  15 g Oral Q15 Min PRN Jerman Coffman MD   15 g at 09/07/22 1815   • epoetin hubert (EPOGEN,PROCRIT) injection 6,000 Units  6,000 Units Intravenous Once per day on Mon Wed Fri Ace Lauren MD   6,000 Units at 09/07/22 1857   • gabapentin (NEURONTIN) capsule 300 mg  300 mg Oral Daily Jerman Coffman MD   300 mg at 09/09/22 0820   • gabapentin (NEURONTIN) capsule 300 mg  300 mg Oral Once per day on Mon Wed Fri Rianna Macias MD       • glucagon (human recombinant) (GLUCAGEN DIAGNOSTIC) injection 1 mg  1 mg Intramuscular Q15 Min PRN Jerman Coffman MD       • heparin (porcine) injection 4,000 Units  4,000 Units Intracatheter PRN Ace Lauren MD   4,000 Units at 09/07/22 1855   • heparin (porcine) injection 4,000 Units  4,000 Units Intracatheter PRN Ace Lauren MD       • hydrocortisone-bacitracin-zinc oxide-nystatin (MAGIC BARRIER) ointment 1 application  1 application Topical BID Rianna Macias MD   1 application at 09/09/22 0821   • Insulin Aspart (novoLOG) injection 0-24 Units  0-24 Units Subcutaneous TID AC Jerman Coffman MD   8 Units at 09/09/22 1139   • Insulin Aspart (novoLOG) injection 30 Units  30 Units Subcutaneous TID With Meals Jerman Coffman MD   30 Units at 09/07/22 1239   • insulin detemir (LEVEMIR) injection 36 Units  36 Units Subcutaneous Nightly Rianna Macias MD       • ipratropium-albuterol (DUO-NEB) nebulizer solution 3 mL  3 mL Nebulization 4x Daily - RT Jerman Coffman MD   3 mL at 09/09/22 1049   • isosorbide mononitrate (IMDUR) 24 hr tablet 30 mg  30 mg Oral QAM Jerman Coffman MD   30 mg at 09/09/22 0641   • levothyroxine (SYNTHROID, LEVOTHROID) tablet 25 mcg  25 mcg Oral Daily Jerman Coffman MD   25 mcg at 09/09/22 0820   • lisinopril (PRINIVIL,ZESTRIL) tablet 20 mg  20 mg Oral Daily Jerman Coffman MD   20 mg at 09/09/22 0830   • melatonin tablet 5.25 mg  5.25 mg Oral Nightly PRN Jerman Coffman MD    5.25 mg at 09/08/22 2035   • metoprolol tartrate (LOPRESSOR) tablet 25 mg  25 mg Oral Q12H Jerman Coffman MD   25 mg at 09/09/22 0820   • montelukast (SINGULAIR) tablet 10 mg  10 mg Oral Nightly Jerman Coffman MD   10 mg at 09/08/22 2043   • morphine injection 1 mg  1 mg Intravenous Q4H PRN Jerman Coffman MD   1 mg at 09/08/22 2036    And   • naloxone (NARCAN) injection 0.4 mg  0.4 mg Intravenous Q5 Min PRN Jerman Coffman MD       • nitroglycerin (NITROSTAT) SL tablet 0.4 mg  0.4 mg Sublingual Q5 Min PRN Jerman Coffman MD       • nystatin (MYCOSTATIN) powder   Topical Q12H Jerman Coffman MD   Given at 09/09/22 0821   • ondansetron (ZOFRAN) tablet 4 mg  4 mg Oral Q6H PRN Jerman Coffman MD        Or   • ondansetron (ZOFRAN) injection 4 mg  4 mg Intravenous Q6H PRN Jerman Coffman MD   4 mg at 09/09/22 0410   • oxybutynin XL (DITROPAN-XL) 24 hr tablet 5 mg  5 mg Oral Daily Jerman Coffman MD   5 mg at 09/09/22 0820   • pantoprazole (PROTONIX) EC tablet 40 mg  40 mg Oral Nightly Jerman Coffman MD   40 mg at 09/08/22 2043   • Pharmacy to dose vancomycin   Does not apply Continuous PRN Jerman Coffman MD       • Pharmacy to Dose Zosyn   Does not apply Continuous PRN eJrman Coffman MD       • piperacillin-tazobactam (ZOSYN) 4.5 g/100 mL 0.9% NS IVPB (mbp)  4.5 g Intravenous Q12H Kit Brar MD   4.5 g at 09/09/22 1139   • povidone-iodine (BETADINE) external solution   Topical Daily Jean Oliveira DPM   Given at 09/09/22 0821   • ranolazine (RANEXA) 12 hr tablet 500 mg  500 mg Oral Q12H Jerman Coffman MD   500 mg at 09/09/22 0820   • Semaglutide tablet 7 mg  7 mg Oral Daily Rianna Macias MD   7 mg at 09/09/22 0830   • sevelamer (RENVELA) tablet 800 mg  800 mg Oral TID With Meals Jerman Coffman MD   800 mg at 09/09/22 1139   • sodium chloride 0.9 % flush 10 mL  10 mL Intravenous PRN Jerman Coffman MD       • sodium chloride 0.9 % flush 10 mL  10 mL Intravenous Q12H Jerman Coffman,  "MD   10 mL at 22 0822   • sodium chloride 0.9 % flush 10 mL  10 mL Intravenous PRN Jerman Coffman MD   10 mL at 22 0821        Physician Progress Notes (last 48 hours)      Ace Lauren MD at 22 1117            [facility]  [address] [place] [address]. [address]  T - [phone]  F - [phone]     Progress Note          PATIENT  DEMOGRAPHICS   PATIENT NAME: Yamileth Slater                      PHYSICIAN: Ace Lauren MD  : 1960  MRN: [id number]   LOS: 2 days    Patient Care Team:  Rianna Macias MD as PCP - General (Family Medicine)  Subjective   SUBJECTIVE   More alert. No soa. [place] [facility]        Objective   OBJECTIVE   Vital Signs  Temp:  [96.6 °F (35.9 °C)-99.2 °F (37.3 °C)] 96.8 °F (36 °C)  Heart Rate:  [71-83] 71  Resp:  [18-20] 20  BP: ()/(48-98) 99/58    Flowsheet Rows    Flowsheet Row First Filed Value   Admission Height 157.5 cm (62\") Documented at 2022   Admission Weight 115 kg (254 lb) Documented at 2022 185           I/O last 3 completed shifts:  In: 840 [P.O.:640; IV Piggyback:200]  Out: 3250 [Urine:950; Emesis/NG output:300; Other:2000]    PHYSICAL EXAM    Physical Exam  Constitutional:       Appearance: She is well-developed.   HENT:      Head: Normocephalic.   Eyes:      Pupils: Pupils are equal, round, and reactive to light.   Cardiovascular:      Rate and Rhythm: Normal rate and regular rhythm.      Heart sounds: Normal heart sounds.   Pulmonary:      Effort: Pulmonary effort is normal.      Breath sounds: Normal breath sounds.   Abdominal:      General: Bowel sounds are normal.      Palpations: Abdomen is soft.   Skin:     Coloration: Skin is not jaundiced.   Neurological:      General: No focal deficit present.      Mental Status: She is alert and oriented to person, place, and time.         RESULTS   Results Review:    Results from last 7 days   Lab Units 22  0626 22  0522 " 09/07/22  0650   SODIUM mmol/L 131* 136 130*   POTASSIUM mmol/L 3.6 3.4* 3.7   CHLORIDE mmol/L 92* 97* 92*   CO2 mmol/L 29.0 31.0* 28.0   BUN mg/dL 24* 18 42*   CREATININE mg/dL 2.71* 1.75* 3.19*   CALCIUM mg/dL 8.5* 8.7 8.8   BILIRUBIN mg/dL 0.3 0.3 0.4   ALK PHOS U/L 158* 139* 172*   ALT (SGPT) U/L <5 <5 <5   AST (SGOT) U/L 6 8 7   GLUCOSE mg/dL 278* 70 452*       Estimated Creatinine Clearance: 25.8 mL/min (A) (by C-G formula based on SCr of 2.71 mg/dL (H)).                Results from last 7 days   Lab Units 09/09/22  0626 09/08/22  0522 09/07/22  0651 09/06/22  2058   WBC 10*3/mm3 11.01* 10.01 12.47* 14.21*   HEMOGLOBIN g/dL 9.8* 9.5* 9.7* 10.4*   PLATELETS 10*3/mm3 286 247 230 224       Results from last 7 days   Lab Units 09/07/22  0650   INR  1.25*         Imaging Results (Last 24 Hours)     ** No results found for the last 24 hours. **           MEDICATIONS    [START ON 9/10/2022] aspirin, 81 mg, Oral, Daily  atorvastatin, 20 mg, Oral, Daily  bumetanide, 2 mg, Oral, Once per day on Sun Tue Thu Sat  cetirizine, 5 mg, Oral, Daily  clopidogrel, 75 mg, Oral, Daily  collagenase, 1 application, Topical, Daily  epoetin hubert/hubert-epbx, 6,000 Units, Intravenous, Once per day on Mon Wed Fri  gabapentin, 300 mg, Oral, Daily  gabapentin, 300 mg, Oral, Once per day on Mon Wed Fri  hydrocortisone-bacitracin-zinc oxide-nystatin, 1 application, Topical, BID  Insulin Aspart, 0-24 Units, Subcutaneous, TID AC  Insulin Aspart, 30 Units, Subcutaneous, TID With Meals  insulin detemir, 36 Units, Subcutaneous, Nightly  ipratropium-albuterol, 3 mL, Nebulization, 4x Daily - RT  isosorbide mononitrate, 30 mg, Oral, QAM  levothyroxine, 25 mcg, Oral, Daily  lisinopril, 20 mg, Oral, Daily  metoprolol tartrate, 25 mg, Oral, Q12H  montelukast, 10 mg, Oral, Nightly  nystatin, , Topical, Q12H  oxybutynin XL, 5 mg, Oral, Daily  pantoprazole, 40 mg, Oral, Nightly  piperacillin-tazobactam, 4.5 g, Intravenous, Q12H  povidone-iodine, , Topical,  Daily  ranolazine, 500 mg, Oral, Q12H  Semaglutide, 7 mg, Oral, Daily  senna-docusate sodium, 2 tablet, Oral, BID  sevelamer, 800 mg, Oral, TID With Meals  sodium chloride, 10 mL, Intravenous, Q12H      Pharmacy to dose vancomycin,   Pharmacy to Dose Zosyn,         Assessment & Plan   ASSESSMENT / PLAN      Gangrene of both feet (HCC)    Mixed hyperlipidemia    GERD (gastroesophageal reflux disease)    Hypertension    Coronary artery disease involving native coronary artery of native heart without angina pectoris    Hypothyroidism    MIKE and COPD overlap syndrome (HCC)    Cellulitis of foot associated with diabetes mellitus (HCC)    Type 2 diabetes mellitus with autonomic neuropathy (HCC)    Physical deconditioning    ESRD on hemodialysis (HCC)    Anemia       1. ESRD on HD MWF:  - Dialysis dependent since October 2021.  [facility].   - HD today. HD via tunnel cath. Awaiting AVF creation. Prior right UE AVF     2. Anemia of chronic kidney disease:  - Patient had extensive work-up in the past by Dr. Munoz. She also has B12 deficiency. She has history of AVM.    - Hemoglobin is acceptable . On epogen     3. Hypertension:  - Well controlled     4. Sepsis/Gangrene:  - Manage per primary team. Dr Oliveira is on board, plan to transfer to [facility] with Infectious disease coverage     5. Diabetes type 2:  - Management per primary team.      6. Coronary artery disease     7. Chronic kidney disease/mineral and bone disorder:  - On sevelamer                      This document has been electronically signed by Ace Lauren MD on September 9, 2022 11:17 CDT           Electronically signed by Ace Lauren MD at 09/09/22 1209     Rianna Macias MD at 09/09/22 0643     Attestation signed by Alex Wells MD at 09/09/22 1200    I have seen and evaluated the patient.  I have discussed the case with the resident. I have reviewed the notes, assessment and plan, and/or procedures performed by the  resident. I concur with the resident’s documentation.    Physical Exam:  General: NAD.  CV: S1 and S2 normal. RRR.  Pulmonary: Clear to auscultation bilaterally, no wheezing, no rales.   Abdomen: Bowel sounds present and normal. Abdomen is soft, obese, and nontender   Extremities: necrotic changes over the LE toes (right 1st and 2nd toe and left 2nd toe)    Plan:   62 y.o. female with a PMH of ESRD on dialysis MWF, diabetes mellitus, morbid obesity, CAD s/p stents, hyperlipidemia, MIKE/COPD, hypothyroidism   who presents with worsening toe wounds and is being managed for gangrene of both feet. Podiatry on the case. D/w Pt and she prefers to be transferred to a hospital with ID services. Will arrange for transfer.                     FAMILY MEDICINE RESIDENCY SERVICE  DAILY PROGRESS NOTE    NAME: Yamileth Slater  : 1960  MRN: [id number]      LOS: 2 days     PROVIDER OF SERVICE: Rianna Macias MD    Chief Complaint: Gangrene of both feet (HCC)    Subjective:     Interval History:  History taken from: patient chart    Patient complaints of bilateral toe pain. Describe the pain as stabbing in nature. Denies any fevers or chills.   Patient's glucose last night was 290 and 295 this morning.     Review of Systems:   Review of Systems   Constitutional: Negative for appetite change, chills and fever.   Respiratory: Negative for cough, chest tightness, shortness of breath and wheezing.    Cardiovascular: Negative for chest pain, palpitations and leg swelling.   Gastrointestinal: Negative for abdominal pain, constipation, diarrhea, rectal pain and vomiting.   Musculoskeletal: Positive for arthralgias.   Skin: Positive for wound.   Neurological: Negative for dizziness.       Objective:     Vital Signs  Temp:  [96.6 °F (35.9 °C)-99.2 °F (37.3 °C)] 98.2 °F (36.8 °C)  Heart Rate:  [77-83] 77  Resp:  [18-20] 20  BP: ()/(48-98) 171/77  Flow (L/min):  [2] 2   Body mass index is 47.02 kg/m².    Physical  Exam  Physical Exam  Vitals reviewed.   Constitutional:       General: She is not in acute distress.     Appearance: She is not ill-appearing.   HENT:      Head: Normocephalic and atraumatic.      Right Ear: External ear normal.      Left Ear: External ear normal.      Mouth/Throat:      Mouth: Mucous membranes are moist.   Eyes:      Conjunctiva/sclera: Conjunctivae normal.   Cardiovascular:      Rate and Rhythm: Normal rate and regular rhythm.   Pulmonary:      Effort: Pulmonary effort is normal. No respiratory distress.      Breath sounds: Normal breath sounds.   Abdominal:      Palpations: Abdomen is soft.      Tenderness: There is no abdominal tenderness.   Feet:      Comments: Wounds on right 1st and 2nd toe, and left 2nd toe. Betadine on wounds   Neurological:      Mental Status: She is alert.         Scheduled Meds   [START ON 9/10/2022] aspirin, 81 mg, Oral, Daily  atorvastatin, 20 mg, Oral, Daily  bumetanide, 2 mg, Oral, Once per day on Sun Tue Thu Sat  cetirizine, 5 mg, Oral, Daily  clopidogrel, 75 mg, Oral, Daily  collagenase, 1 application, Topical, Daily  epoetin hubert/hubert-epbx, 6,000 Units, Intravenous, Once per day on Mon Wed Fri  gabapentin, 300 mg, Oral, Daily  gabapentin, 300 mg, Oral, Once per day on Mon Wed Fri  hydrocortisone-bacitracin-zinc oxide-nystatin, 1 application, Topical, BID  Insulin Aspart, 0-24 Units, Subcutaneous, TID AC  Insulin Aspart, 30 Units, Subcutaneous, TID With Meals  insulin detemir, 36 Units, Subcutaneous, Nightly  ipratropium-albuterol, 3 mL, Nebulization, 4x Daily - RT  isosorbide mononitrate, 30 mg, Oral, QAM  levothyroxine, 25 mcg, Oral, Daily  lisinopril, 20 mg, Oral, Daily  metoprolol tartrate, 25 mg, Oral, Q12H  montelukast, 10 mg, Oral, Nightly  nystatin, , Topical, Q12H  oxybutynin XL, 5 mg, Oral, Daily  pantoprazole, 40 mg, Oral, Nightly  piperacillin-tazobactam, 4.5 g, Intravenous, Q12H  povidone-iodine, , Topical, Daily  ranolazine, 500 mg, Oral,  Q12H  Semaglutide, 7 mg, Oral, Daily  senna-docusate sodium, 2 tablet, Oral, BID  sevelamer, 800 mg, Oral, TID With Meals  sodium chloride, 10 mL, Intravenous, Q12H       PRN Meds   •  acetaminophen  •  albumin human  •  albuterol  •  senna-docusate sodium **AND** polyethylene glycol **AND** bisacodyl **AND** bisacodyl  •  Capsaicin  •  dextrose  •  dextrose  •  glucagon (human recombinant)  •  heparin (porcine)  •  heparin (porcine)  •  melatonin  •  Morphine **AND** naloxone  •  nitroglycerin  •  ondansetron **OR** ondansetron  •  Pharmacy to dose vancomycin  •  Pharmacy to Dose Zosyn  •  sodium chloride  •  sodium chloride      Diagnostic Data    Results from last 7 days   Lab Units 09/09/22  0626   WBC 10*3/mm3 11.01*   HEMOGLOBIN g/dL 9.8*   HEMATOCRIT % 31.7*   PLATELETS 10*3/mm3 286   GLUCOSE mg/dL 278*   CREATININE mg/dL 2.71*   BUN mg/dL 24*   SODIUM mmol/L 131*   POTASSIUM mmol/L 3.6   AST (SGOT) U/L 6   ALT (SGPT) U/L <5   ALK PHOS U/L 158*   BILIRUBIN mg/dL 0.3   ANION GAP mmol/L 10.0       MRI Foot Right Without Contrast    Result Date: 9/7/2022  Impression: 1. Severe motion degradation. The technologist notes the patient was repeatedly asked to remain still. Multiple sequences are repeated. Motion decreases sensitivity and specificity of the exam. 2. There appears to be mild bone marrow edema involving the phalanges of the first and second digits without clear accompanying T1 signal changes. Findings are indeterminate for osteomyelitis. Subcutaneous edema of the first and second toes likely cellulitis. Limited sensitivity for abscess. Repeat MRI examination with the addition of gadolinium contrast when the patient is able to hold still, or three-phase bone scan may be considered if increased sensitivity and specificity is desired. 3. Muscle fatty atrophy suggesting presence of a polyneuropathy. Electronically signed by:  Froylan Bennett MD  9/7/2022 1:09 PM CDT Workstation: [id number]    US Arterial Doppler  Lower Extremity Complete    Result Date: 9/7/2022  Moderate diffuse atherosclerotic irregularity with no evidence of any significant femoral or popliteal artery stenosis or occlusion. There appears to be three-vessel runoff bilaterally Electronically signed by:  Pablo Rubio MD  9/7/2022 11:52 PM CDT Workstation: [id number]        I reviewed the patient's new clinical results.    Assessment/Plan:     Active and Resolved Problems  Active Hospital Problems    Diagnosis  POA   • **Gangrene of both feet ([facility]) [I96]  Unknown   • Anemia [D64.9]  Yes   • ESRD on hemodialysis ([facility]) [N18.6, Z99.2]  Not Applicable   • Physical deconditioning [R53.81]  Yes   • Type 2 diabetes mellitus with autonomic neuropathy ([facility]) [E11.43]  Yes   • Cellulitis of foot associated with diabetes mellitus ([facility]) [E11.628, L03.119]  Yes   • MIKE and COPD overlap syndrome ([facility]) [G47.33, J44.9]  Yes   • Hypothyroidism [E03.9]  Yes   • GERD (gastroesophageal reflux disease) [K21.9]  Yes   • Hypertension [I10]  Yes   • Mixed hyperlipidemia [E78.2]  Yes   • Coronary artery disease involving native coronary artery of native heart without angina pectoris [I25.10]  Yes      Resolved Hospital Problems   No resolved problems to display.       #Gangrene of both feet:  Known vasculopath and poorly controlled diabetic. Presented with leukocytosis and elevated CRP. Patient with a complex infectious disease history with known MRSA infection and previous CRE infection as well.   - Patient on Vancomycin and Zosyn - need for MRSA and Pseudomonas coverage - Pharmacy to help with dosage  - Podiatry consulted. Betadine on gangrenous digits as recommended by podiatry. Not a good surgical candidate  - CRP up-trending down from 34.1 to 18.68  - Sed rate elevated at 114 on admission, trending down to 100  - Bilateral MRI of the foot inconclusive, possible osteomyelitis. Was not able to get with contrast due to ESKD and patient was moving during imaging  - Patient has history  of PAD. Arterial doppler of lower extremities with moderate atherosclerotic irregularity.     #Sepsis:  Patient has elevated leukocytosis however not febrile, heart rate has normalized and respiration rate normalized.    - Continue to monitor.    - Lactate was normal at 1.6.   - Blood culture pending     #Cellulitis:  - Above antibiotic coverage will likely cover for any possible cellulitis.   - Continue to monitor progression via visual exam and response to lab work.     #ESRD on dialysis:  - Patient is on dialysis 3 times a week (M/W/F)  - Last dialysis was 9/7/2022, dialysis today  - Creatinine slightly above baseline at 3.19 >1.75 > 2.71.  - Nephrology consulted for dialysis  - At current presentation, risks of not appropriately treating sepsis/gangrene/cellulitis outweighs potential nephrotoxic nature of antibiotics listed above.    - Avoid other nephrotoxic agents.     #MIKE/COPD:   - Nasal cannula oxygen to maintain oxygen saturation 82 to 92%.   - Patient may use home trilogy machine.     #Hypothyroidism:   - Continue home levothyroxine 25 mcg dosage.     #Diabetes:  Patient reports home regimen of 40 units long-acting insulin twice a day and 40 units short acting insulin prandially.   - Patient started on cardiac/consistent carb/renal diet which is vastly different from her home dosage.    - Slight decrease in insulin at this time.   - Patient long term insulin changed to 36 units at night and 30 units with meals  - Rebylsus 7 mg restarted for better glucose control    #Anemia:   - Chronic and stable.    - Continue to monitor  - Restart epogen     #Hypertension:   - Restart home medications     #CAD  - Patient on atorvastatin, aspirin, plavix, and metoprolol tartrate. Restart home meds    DVT prophylaxis:  Medical DVT prophylaxis orders are present.     Code status is   Code Status and Medical Interventions:   Ordered at: 09/06/22 4723     Level Of Support Discussed With:    Patient     Code Status (Patient  "has no pulse and is not breathing):    CPR (Attempt to Resuscitate)     Medical Interventions (Patient has pulse or is breathing):    Full Support       Plan for disposition:Home with HH vs LTACH  in 2-3 days    Time: 30 minutes    Signature  [place] [address]  Office: [phone]        This document has been electronically signed by Rianna Macias MD on 2022 08:20 CDT     Electronically signed by Alex Wells MD at 22 1200     Ace Lauren MD at 22 1155            [facility]  [address] [place] [address]. [address]  T - [phone]  F - [phone]     Progress Note          PATIENT  DEMOGRAPHICS   PATIENT NAME: Yamileth Slater                      PHYSICIAN: Ace Lauren MD  : 1960  MRN: [id number]   LOS: 1 day    Patient Care Team:  Rianna Macias MD as PCP - General (Family Medicine)  Subjective   SUBJECTIVE   More alert. No soa       Objective   OBJECTIVE   Vital Signs  Temp:  [96.4 °F (35.8 °C)-96.8 °F (36 °C)] 96.4 °F (35.8 °C)  Heart Rate:  [61-81] 81  Resp:  [16-18] 18  BP: (100-163)/(59-77) 159/77    Flowsheet Rows    Flowsheet Row First Filed Value   Admission Height 157.5 cm (62\") Documented at 2022   Admission Weight 115 kg (254 lb) Documented at 2022           I/O last 3 completed shifts:  In: 1520 [P.O.:1420; IV Piggyback:100]  Out: 3450 [Urine:1450; Other:2000]    PHYSICAL EXAM    Physical Exam  Constitutional:       Appearance: She is well-developed.   HENT:      Head: Normocephalic.   Eyes:      Pupils: Pupils are equal, round, and reactive to light.   Cardiovascular:      Rate and Rhythm: Normal rate and regular rhythm.      Heart sounds: Normal heart sounds.   Pulmonary:      Effort: Pulmonary effort is normal.      Breath sounds: Normal breath sounds.   Abdominal:      General: Bowel sounds are normal.      " Palpations: Abdomen is soft.   Skin:     Coloration: Skin is not jaundiced.   Neurological:      General: No focal deficit present.      Mental Status: She is alert and oriented to person, place, and time.         RESULTS   Results Review:    Results from last 7 days   Lab Units 09/08/22 0522 09/07/22  0650 09/06/22 2058   SODIUM mmol/L 136 130* 129*   POTASSIUM mmol/L 3.4* 3.7 3.7   CHLORIDE mmol/L 97* 92* 89*   CO2 mmol/L 31.0* 28.0 28.0   BUN mg/dL 18 42* 43*   CREATININE mg/dL 1.75* 3.19* 3.18*   CALCIUM mg/dL 8.7 8.8 9.1   BILIRUBIN mg/dL 0.3 0.4 0.4   ALK PHOS U/L 139* 172* 194*   ALT (SGPT) U/L <5 <5 5   AST (SGOT) U/L 8 7 9   GLUCOSE mg/dL 70 452* 549*       Estimated Creatinine Clearance: 39.7 mL/min (A) (by C-G formula based on SCr of 1.75 mg/dL (H)).                Results from last 7 days   Lab Units 09/08/22 0522 09/07/22  0651 09/06/22 2058   WBC 10*3/mm3 10.01 12.47* 14.21*   HEMOGLOBIN g/dL 9.5* 9.7* 10.4*   PLATELETS 10*3/mm3 247 230 224       Results from last 7 days   Lab Units 09/07/22  0650   INR  1.25*         Imaging Results (Last 24 Hours)     Procedure Component Value Units Date/Time    US Arterial Doppler Lower Extremity Complete [[id number]] Collected: 09/07/22 2045     Updated: 09/07/22 2355    Narrative:      INDICATION: Ulcers on toes bilateral, possible gangrene, A41.9  Sepsis, unspecified organism E11.621 Type 2 diabetes mellitus  with foot ulcer L97.509 Non-pressure chronic ulcer of other part  of unspecified foot with unspecified severity R73.9  Hyperglycemia, unspecified    EXAMINATION/TECHNIQUE: Duplex and color Doppler imaging of both  lower extremities.    COMPARISON: None.  ____________________________________________    FINDINGS:    Right leg:  Pulsatile monophasic waveforms are seen within the common  femoral, profunda femoral, and superficial femoral arteries.  There is spectral broadening distally. There is no occlusion or  thrombosis. Popliteal artery is patent with  monophasic waveforms.  In the proximal popliteal artery are slightly elevated velocities  suggesting a moderate stenosis. Monophasic waveforms are seen  within the right calf.    Left leg:  Pulsatile monophasic waveforms are seen within the common  femoral, profunda femoral, and superficial femoral arteries.  There is no occlusion. There is no evidence of any high-grade  stenosis. Waveforms in the popliteal artery are monophasic and  slightly blunted. Monophasic waveforms are seen throughout the  left calf. The distal anterior tibial artery is obscured by  dressings      Impression:      Moderate diffuse atherosclerotic irregularity with no evidence of  any significant femoral or popliteal artery stenosis or  occlusion. There appears to be three-vessel runoff bilaterally    Electronically signed by:  Pablo Rubio MD  9/7/2022 11:52 PM CDT  Workstation: [id number]    MRI Foot Right Without Contrast [[id number]] Collected: 09/07/22 1043     Updated: 09/07/22 1311    Narrative:      Comparison:  9/6/2022    Indication:  Right foot ulcer. Type 2 diabetes. Rule out  osteomyelitis.    Technique:  Magnetic resonance imaging of the right foot was  performed without intravenous contrast, utilizing routine  sequences.  Every series is degraded by patient motion.    Findings:    First ray:  There is normal bone configuration. There appears to  be bone marrow edema of the proximal phalanx.    Second ray:  There is normal bone configuration.  There is normal  bone marrow signal.  No evidence of fracture or osteomyelitis.    Third ray:  There is normal bone configuration. There appears to  be bone marrow edema of the phalanges.    Fourth ray:  There is normal bone configuration.  There is normal  bone marrow signal.  No evidence of fracture or osteomyelitis.    Fifth ray:  There is normal bone configuration.  There is normal  bone marrow signal.  No evidence of fracture or osteomyelitis.    Additional joints:  The visualized joints  of the hindfoot and  midfoot appear grossly intact.  There are mild degenerative  changes.      Bursae and soft tissues: There is muscle fatty atrophy.    Subcutaneous edema of the first and second toes likely  cellulitis.    Other:      Impression:      Impression:  1. Severe motion degradation. The technologist notes the patient  was repeatedly asked to remain still. Multiple sequences are  repeated. Motion decreases sensitivity and specificity of the  exam.  2. There appears to be mild bone marrow edema involving the  phalanges of the first and second digits without clear  accompanying T1 signal changes. Findings are indeterminate for  osteomyelitis. Subcutaneous edema of the first and second toes  likely cellulitis. Limited sensitivity for abscess. Repeat MRI  examination with the addition of gadolinium contrast when the  patient is able to hold still, or three-phase bone scan may be  considered if increased sensitivity and specificity is desired.  3. Muscle fatty atrophy suggesting presence of a polyneuropathy.    Electronically signed by:  Froylan Bennett MD  9/7/2022 1:09 PM CDT  Workstation: [id number]           MEDICATIONS    atorvastatin, 20 mg, Oral, Daily  bumetanide, 2 mg, Oral, Once per day on Sun Tue Thu Sat  cetirizine, 5 mg, Oral, Daily  collagenase, 1 application, Topical, Daily  epoetin hubert/hubert-epbx, 6,000 Units, Intravenous, Once per day on Mon Wed Fri  gabapentin, 300 mg, Oral, Daily  hydrocortisone-bacitracin-zinc oxide-nystatin, 1 application, Topical, BID  Insulin Aspart, 0-24 Units, Subcutaneous, TID AC  Insulin Aspart, 30 Units, Subcutaneous, TID With Meals  insulin detemir, 30 Units, Subcutaneous, Q12H  ipratropium-albuterol, 3 mL, Nebulization, 4x Daily - RT  isosorbide mononitrate, 30 mg, Oral, QAM  levothyroxine, 25 mcg, Oral, Daily  lisinopril, 20 mg, Oral, Daily  metoprolol tartrate, 25 mg, Oral, Q12H  montelukast, 10 mg, Oral, Nightly  nystatin, , Topical, Q12H  oxybutynin XL, 5 mg, Oral,  Daily  pantoprazole, 40 mg, Oral, Nightly  PATIENT SUPPLIED MEDICATION, 7 mg, Oral, Daily  piperacillin-tazobactam, 4.5 g, Intravenous, Q12H  ranolazine, 500 mg, Oral, Q12H  senna-docusate sodium, 2 tablet, Oral, BID  sevelamer, 800 mg, Oral, TID With Meals  sodium chloride, 10 mL, Intravenous, Q12H      Pharmacy to dose vancomycin,   Pharmacy to Dose Zosyn,         Assessment & Plan   ASSESSMENT / PLAN      Gangrene of both feet (HCC)    Mixed hyperlipidemia    GERD (gastroesophageal reflux disease)    Hypertension    Coronary artery disease involving native coronary artery of native heart without angina pectoris    Hypothyroidism    MIKE and COPD overlap syndrome (HCC)    Cellulitis of foot associated with diabetes mellitus ([facility])    Type 2 diabetes mellitus with autonomic neuropathy ([facility])    Physical deconditioning    ESRD on hemodialysis ([facility])    Anemia       1. ESRD on HD MWF:  - Dialysis dependent since October 2021.  On dialysis at [facility].   - Plan HD next tomorrow     2. Anemia of chronic kidney disease:  - Patient had extensive work-up in the past by Dr. Munoz. She also has B12 deficiency. She has history of AVM.    - Hemoglobin is acceptable      3. Hypertension:  - Well controlled     4. Sepsis/Gangrene:  - Manage per primary team. Dr Oliveira is on board, MRI indeterminate finding     5. Diabetes type 2:  - Management per primary team.      6. Coronary artery disease     7. Chronic kidney disease/mineral and bone disorder:  - On sevelamer                      This document has been electronically signed by Ace Lauren MD on September 8, 2022 11:55 CDT           Electronically signed by Ace Lauren MD at 09/08/22 1158     Rianna Macias MD at 09/08/22 0618     Attestation signed by Aelx Wells MD at 09/09/22 1202    I have seen and evaluated the patient.  I have discussed the case with the resident. I have reviewed the notes, assessment and plan, and/or procedures performed by the  resident. I concur with the resident’s documentation.     Physical Exam:  General: NAD.  CV: S1 and S2 normal. RRR.  Pulmonary: Clear to auscultation bilaterally, no wheezing, no rales.   Abdomen: Bowel sounds present and normal. Abdomen is soft, obese, and nontender   Extremities: necrotic changes over the LE toes (right 1st and 2nd toe and left 2nd toe)     Plan:   62 y.o. female with a PMH of ESRD on dialysis MWF, diabetes mellitus, morbid obesity, CAD s/p stents, hyperlipidemia, MIKE/COPD, hypothyroidism   who presents with worsening toe wounds and is being managed for gangrene of both feet. Podiatry on the case. Pt on broad spectrum iv antibiotics.                     FAMILY MEDICINE RESIDENCY SERVICE  DAILY PROGRESS NOTE    NAME: Yamileth Slater  : 1960  MRN: [id number]      LOS: 1 day     PROVIDER OF SERVICE: Rianna Macias MD    Chief Complaint: Gangrene of both feet (HCC)    Subjective:     Interval History:  History taken from: patient chart RN    Patient because hypoglycemic at 58 last night, and required dextrose 2x and orange juice.  Patient reports that she still has lower extremity pain bilaterally. She reports  That she has not eaten this morning since she is PO for possible  procedure with podiatry.     Review of Systems:   Review of Systems   Constitutional: Negative for appetite change, diaphoresis and unexpected weight change.   Respiratory: Negative for cough, chest tightness, shortness of breath and wheezing.    Cardiovascular: Negative for chest pain and palpitations.   Gastrointestinal: Negative for abdominal pain, constipation, diarrhea, nausea and vomiting.   Musculoskeletal: Positive for myalgias.   Skin: Positive for wound.   Neurological: Negative for dizziness and headaches.   Psychiatric/Behavioral: Negative for agitation.       Objective:     Vital Signs  Temp:  [96.4 °F (35.8 °C)-96.8 °F (36 °C)] 96.4 °F (35.8 °C)  Heart Rate:  [61-81] 81  Resp:  [16-18] 18  BP:  (100-163)/(59-77) 159/77  Flow (L/min):  [2] 2   Body mass index is 46.64 kg/m².    Physical Exam  Physical Exam  Vitals reviewed.   Constitutional:       General: She is not in acute distress.     Appearance: She is obese. She is not ill-appearing.   HENT:      Head: Normocephalic and atraumatic.      Right Ear: External ear normal.      Left Ear: External ear normal.      Nose: Nose normal.      Mouth/Throat:      Mouth: Mucous membranes are moist.   Eyes:      Conjunctiva/sclera: Conjunctivae normal.   Cardiovascular:      Rate and Rhythm: Normal rate and regular rhythm.   Pulmonary:      Effort: Pulmonary effort is normal. No respiratory distress.      Breath sounds: Normal breath sounds.      Comments: On 2 L NC  Abdominal:      Palpations: Abdomen is soft.      Tenderness: There is no abdominal tenderness.   Feet:      Comments: Ulcers present on right 1st and 2nd toe and left 2nd toe.   Neurological:      Mental Status: She is alert.         Scheduled Meds   atorvastatin, 20 mg, Oral, Daily  bumetanide, 2 mg, Oral, Once per day on Sun Tue Thu Sat  cetirizine, 5 mg, Oral, Daily  collagenase, 1 application, Topical, Daily  epoetin hubert/hubert-epbx, 6,000 Units, Intravenous, Once per day on Mon Wed Fri  gabapentin, 300 mg, Oral, Daily  hydrocortisone-bacitracin-zinc oxide-nystatin, 1 application, Topical, BID  Insulin Aspart, 0-24 Units, Subcutaneous, TID AC  Insulin Aspart, 30 Units, Subcutaneous, TID With Meals  insulin detemir, 30 Units, Subcutaneous, Q12H  ipratropium-albuterol, 3 mL, Nebulization, 4x Daily - RT  isosorbide mononitrate, 30 mg, Oral, QAM  levothyroxine, 25 mcg, Oral, Daily  lisinopril, 20 mg, Oral, Daily  metoprolol tartrate, 25 mg, Oral, Q12H  montelukast, 10 mg, Oral, Nightly  nystatin, , Topical, Q12H  oxybutynin XL, 5 mg, Oral, Daily  pantoprazole, 40 mg, Oral, Nightly  PATIENT SUPPLIED MEDICATION, 7 mg, Oral, Daily  piperacillin-tazobactam, 4.5 g, Intravenous, Q12H  ranolazine, 500 mg,  Oral, Q12H  senna-docusate sodium, 2 tablet, Oral, BID  sevelamer, 800 mg, Oral, TID With Meals  sodium chloride, 10 mL, Intravenous, Q12H       PRN Meds   •  acetaminophen  •  albumin human  •  albuterol  •  senna-docusate sodium **AND** polyethylene glycol **AND** bisacodyl **AND** bisacodyl  •  Capsaicin  •  dextrose  •  dextrose  •  glucagon (human recombinant)  •  heparin (porcine)  •  melatonin  •  Morphine **AND** naloxone  •  nitroglycerin  •  ondansetron **OR** ondansetron  •  Pharmacy to dose vancomycin  •  Pharmacy to Dose Zosyn  •  sodium chloride  •  sodium chloride      Diagnostic Data    Results from last 7 days   Lab Units 09/08/22  0522   WBC 10*3/mm3 10.01   HEMOGLOBIN g/dL 9.5*   HEMATOCRIT % 30.0*   PLATELETS 10*3/mm3 247   GLUCOSE mg/dL 70   CREATININE mg/dL 1.75*   BUN mg/dL 18   SODIUM mmol/L 136   POTASSIUM mmol/L 3.4*   AST (SGOT) U/L 8   ALT (SGPT) U/L <5   ALK PHOS U/L 139*   BILIRUBIN mg/dL 0.3   ANION GAP mmol/L 8.0       MRI Foot Right Without Contrast    Result Date: 9/7/2022  Impression: 1. Severe motion degradation. The technologist notes the patient was repeatedly asked to remain still. Multiple sequences are repeated. Motion decreases sensitivity and specificity of the exam. 2. There appears to be mild bone marrow edema involving the phalanges of the first and second digits without clear accompanying T1 signal changes. Findings are indeterminate for osteomyelitis. Subcutaneous edema of the first and second toes likely cellulitis. Limited sensitivity for abscess. Repeat MRI examination with the addition of gadolinium contrast when the patient is able to hold still, or three-phase bone scan may be considered if increased sensitivity and specificity is desired. 3. Muscle fatty atrophy suggesting presence of a polyneuropathy. Electronically signed by:  Froylan Bennett MD  9/7/2022 1:09 PM CDT Workstation: [id number]    US Arterial Doppler Lower Extremity Complete    Result Date:  9/7/2022  Moderate diffuse atherosclerotic irregularity with no evidence of any significant femoral or popliteal artery stenosis or occlusion. There appears to be three-vessel runoff bilaterally Electronically signed by:  Pablo Rubio MD  9/7/2022 11:52 PM CDT Workstation: [id number]    XR Foot 3+ View Bilateral    Result Date: 9/6/2022  No definite plain radiographic evidence of osteomyelitis in either foot. If there is high clinical concern consider MRI. Electronically signed by:  Eusebio Sargent  9/6/2022 9:05 PM CDT Workstation: [id number]        I reviewed the patient's new clinical results.    Assessment/Plan:     Active and Resolved Problems  Active Hospital Problems    Diagnosis  POA   • **Gangrene of both feet ([facility]) [I96]  Unknown   • Anemia [D64.9]  Yes   • ESRD on hemodialysis ([facility]) [N18.6, Z99.2]  Not Applicable   • Physical deconditioning [R53.81]  Yes   • Type 2 diabetes mellitus with autonomic neuropathy ([facility]) [E11.43]  Yes   • Cellulitis of foot associated with diabetes mellitus ([facility]) [E11.628, L03.119]  Yes   • MIKE and COPD overlap syndrome ([facility]) [G47.33, J44.9]  Yes   • Hypothyroidism [E03.9]  Yes   • GERD (gastroesophageal reflux disease) [K21.9]  Yes   • Hypertension [I10]  Yes   • Mixed hyperlipidemia [E78.2]  Yes   • Coronary artery disease involving native coronary artery of native heart without angina pectoris [I25.10]  Yes      Resolved Hospital Problems   No resolved problems to display.       #Gangrene of both feet:  Known vasculopath and poorly controlled diabetic. Presented with leukocytosis and elevated CRP. Patient with a complex infectious disease history with known MRSA infection and previous CRE infection as well.   - Patient on Vancomycin and Zosyn - need for MRSA and Pseudomonas coverage - Pharmacy to help with dosage  - Podiatry consulted, Currently NPO. Possible procedure   - CRP up-trending from 32.67 to 34.1, will recheck tomorrow  - Sed rate elevated at 114, recheck tomorrow  -  Bilateral MRI of the foot inconclusive, possible osteomyelitis. Was not able to get with contrast due to ESKD and patient was moving during imaging  - Patient has history of PAD. Arterial doppler of lower extremities with moderate atherosclerotic irregularity.    #Sepsis:  Patient has elevated leukocytosis however not febrile, heart rate has normalized and respiration rate normalized.    - Continue to monitor.    - Lactate was normal at 1.6.   - Blood culture pedning     #Cellulitis:  - Above antibiotic coverage will likely cover for any possible cellulitis.   - Continue to monitor progression via visual exam and response to lab work.     #ESRD on dialysis:  - Patient is on dialysis 3 times a week (M/W/F)  - Last dialysis was 9/1/2022.   - Creatinine slightly above baseline at 3.19 on admission, currently 1.75  - Nephrology consulted for dialysis  - At current presentation, risks of not appropriately treating sepsis/gangrene/cellulitis outweighs potential nephrotoxic nature of antibiotics listed above.    - Avoid other nephrotoxic agents.     #MIKE/COPD:   - Nasal cannula oxygen to maintain oxygen saturation 82 to 92%.   - Patient may use home trilogy machine.     #Hypothyroidism:   - Continue home levothyroxine 25 mcg dosage.     #Diabetes:  Patient reports home regimen of 40 units long-acting insulin twice a day and 40 units short acting insulin prandially.   - Patient started on cardiac/consistent carb/renal diet which is vastly different from her home dosage.    - Slight decrease in insulin at this time.   - Current regimen will be Levemir 30 units twice daily and 30 units prandial with high-dose sliding scale insulin.  - Will restart rebylsus 7 mg for better glucose control, rybelsus 3 mg provided sample provided  - NPO currently.     #Anemia:   - Chronic and stable.    - Continue to monitor  - Restart epogen     #Hypertension:   - Restart home medications    #CAD  Patient on home atorvastatin, aspirin, Plavix,  and metoprolol tartrate.       DVT prophylaxis:  Medical DVT prophylaxis orders are present.     Code status is   Code Status and Medical Interventions:   Ordered at: 09/06/22 2310     Level Of Support Discussed With:    Patient     Code Status (Patient has no pulse and is not breathing):    CPR (Attempt to Resuscitate)     Medical Interventions (Patient has pulse or is breathing):    Full Support       Plan for disposition:Home with HH in 2-3 days days    Time: 30 minutes    Signature  [place] [address]  Office: [phone]        This document has been electronically signed by Rianna Macias MD on September 8, 2022 12:13 CDT     Electronically signed by Alex Wells MD at 09/09/22 1202       Medical Student Notes (last 24 hours)  Notes from 09/08/22 1221 through 09/09/22 1221   No notes of this type exist for this encounter.         Consult Notes (last 24 hours)  Notes from 09/08/22 1221 through 09/09/22 1221   No notes of this type exist for this encounter.

## 2022-09-09 NOTE — DISCHARGE PLACEMENT REQUEST
"Yamileth Slater (62 y.o. Female)             Date of Birth   1960    Social Security Number       Address   [address] [place] [address]    Home Phone   [phone]    MRN   [id number]       Rastafarian   None    Marital Status                               Admission Date   9/6/22    Admission Type   Emergency    Admitting Provider   Kit Brar MD    Attending Provider   Rianna Macias MD    Department, Room/Bed   [address], 403/1       Discharge Date       Discharge Disposition   [facility] ([facility] - External)    Discharge Destination                               Attending Provider: Rianna Macias MD    Allergies: Adhesive Tape, Nsaids, Latex, Other    Isolation: Contact   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), 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)   Code Status: CPR   Advance Care Planning Activity    Ht: 157.5 cm (62\")   Wt: 117 kg (257 lb 1.6 oz)    Admission Cmt: None   Principal Problem: Gangrene of both feet (HCC) [I96]                 Active Insurance as of 9/6/2022     Primary Coverage     Payor Plan Insurance Group Employer/Plan Group    ANTHEM MEDICARE REPLACEMENT ANTHEM MEDICARE ADVANTAGE [id number]     Payor Plan Address Payor Plan Phone Number Payor Plan Fax Number Effective Dates    [address] [phone]  1/1/2022 - None Entered    [facility] [address]       Subscriber Name Subscriber Birth Date Member ID       YAMILETH SLATER 1960 [id number]           Secondary Coverage     Payor Plan Insurance Group Employer/Plan Group    [place]      Payor Plan Address Payor Plan Phone Number Payor Plan Fax Number Effective Dates    [address] [phone]  6/28/2019 - None Entered    [facility] [id number]       Subscriber Name Subscriber Birth Date Member ID       YAMILETH SLATER 1960 [id number]                 Emergency Contacts      (Rel.) Home Phone Work Phone Mobile Phone    " BryonHuy (Spouse) [phone] -- [phone]    Yamileth Chavez (Daughter) [phone] -- [phone]    Suzanne Rivera (Daughter) [phone] -- [phone]            Emergency Contact Information     Name Relation Home Work Mobile    Huy Slater Spouse [phone]    Yamileth Chavez Daughter [phone]    Suzanne Rivera Daughter [phone]          Insurance Information                [facility] MEDICARE REPLACEMENT/[facility] MEDICARE ADVANTAGE Phone: [phone]    Subscriber: Yamileth Slater Subscriber#: [id number]    Group#: [id number] Precert#: --        [place] Phone: [phone]    Subscriber: Yamileth Slater Subscriber#: [id number]    Group#: -- Precert#: --

## 2022-09-09 NOTE — PROGRESS NOTES
FAMILY MEDICINE RESIDENCY SERVICE  DAILY PROGRESS NOTE    NAME: Yamileth Slater  : 1960  MRN: [id number]      LOS: 2 days     PROVIDER OF SERVICE: Rianna Macias MD    Chief Complaint: Gangrene of both feet (HCC)    Subjective:     Interval History:  History taken from: patient chart    Patient complaints of bilateral toe pain. Describe the pain as stabbing in nature. Denies any fevers or chills.   Patient's glucose last night was 290 and 295 this morning.     Review of Systems:   Review of Systems   Constitutional: Negative for appetite change, chills and fever.   Respiratory: Negative for cough, chest tightness, shortness of breath and wheezing.    Cardiovascular: Negative for chest pain, palpitations and leg swelling.   Gastrointestinal: Negative for abdominal pain, constipation, diarrhea, rectal pain and vomiting.   Musculoskeletal: Positive for arthralgias.   Skin: Positive for wound.   Neurological: Negative for dizziness.       Objective:     Vital Signs  Temp:  [96.6 °F (35.9 °C)-99.2 °F (37.3 °C)] 98.2 °F (36.8 °C)  Heart Rate:  [77-83] 77  Resp:  [18-20] 20  BP: ()/(48-98) 171/77  Flow (L/min):  [2] 2   Body mass index is 47.02 kg/m².    Physical Exam  Physical Exam  Vitals reviewed.   Constitutional:       General: She is not in acute distress.     Appearance: She is not ill-appearing.   HENT:      Head: Normocephalic and atraumatic.      Right Ear: External ear normal.      Left Ear: External ear normal.      Mouth/Throat:      Mouth: Mucous membranes are moist.   Eyes:      Conjunctiva/sclera: Conjunctivae normal.   Cardiovascular:      Rate and Rhythm: Normal rate and regular rhythm.   Pulmonary:      Effort: Pulmonary effort is normal. No respiratory distress.      Breath sounds: Normal breath sounds.   Abdominal:      Palpations: Abdomen is soft.      Tenderness: There is no abdominal tenderness.   Feet:      Comments: Wounds on right 1st and 2nd toe, and left 2nd toe.  Betadine on wounds   Neurological:      Mental Status: She is alert.         Scheduled Meds   [START ON 9/10/2022] aspirin, 81 mg, Oral, Daily  atorvastatin, 20 mg, Oral, Daily  bumetanide, 2 mg, Oral, Once per day on Sun Tue Thu Sat  cetirizine, 5 mg, Oral, Daily  clopidogrel, 75 mg, Oral, Daily  collagenase, 1 application, Topical, Daily  epoetin hubert/hubert-epbx, 6,000 Units, Intravenous, Once per day on Mon Wed Fri  gabapentin, 300 mg, Oral, Daily  gabapentin, 300 mg, Oral, Once per day on Mon Wed Fri  hydrocortisone-bacitracin-zinc oxide-nystatin, 1 application, Topical, BID  Insulin Aspart, 0-24 Units, Subcutaneous, TID AC  Insulin Aspart, 30 Units, Subcutaneous, TID With Meals  insulin detemir, 36 Units, Subcutaneous, Nightly  ipratropium-albuterol, 3 mL, Nebulization, 4x Daily - RT  isosorbide mononitrate, 30 mg, Oral, QAM  levothyroxine, 25 mcg, Oral, Daily  lisinopril, 20 mg, Oral, Daily  metoprolol tartrate, 25 mg, Oral, Q12H  montelukast, 10 mg, Oral, Nightly  nystatin, , Topical, Q12H  oxybutynin XL, 5 mg, Oral, Daily  pantoprazole, 40 mg, Oral, Nightly  piperacillin-tazobactam, 4.5 g, Intravenous, Q12H  povidone-iodine, , Topical, Daily  ranolazine, 500 mg, Oral, Q12H  Semaglutide, 7 mg, Oral, Daily  senna-docusate sodium, 2 tablet, Oral, BID  sevelamer, 800 mg, Oral, TID With Meals  sodium chloride, 10 mL, Intravenous, Q12H       PRN Meds   •  acetaminophen  •  albumin human  •  albuterol  •  senna-docusate sodium **AND** polyethylene glycol **AND** bisacodyl **AND** bisacodyl  •  Capsaicin  •  dextrose  •  dextrose  •  glucagon (human recombinant)  •  heparin (porcine)  •  heparin (porcine)  •  melatonin  •  Morphine **AND** naloxone  •  nitroglycerin  •  ondansetron **OR** ondansetron  •  Pharmacy to dose vancomycin  •  Pharmacy to Dose Zosyn  •  sodium chloride  •  sodium chloride      Diagnostic Data    Results from last 7 days   Lab Units 09/09/22  0626   WBC 10*3/mm3 11.01*   HEMOGLOBIN g/dL 9.8*    HEMATOCRIT % 31.7*   PLATELETS 10*3/mm3 286   GLUCOSE mg/dL 278*   CREATININE mg/dL 2.71*   BUN mg/dL 24*   SODIUM mmol/L 131*   POTASSIUM mmol/L 3.6   AST (SGOT) U/L 6   ALT (SGPT) U/L <5   ALK PHOS U/L 158*   BILIRUBIN mg/dL 0.3   ANION GAP mmol/L 10.0       MRI Foot Right Without Contrast    Result Date: 9/7/2022  Impression: 1. Severe motion degradation. The technologist notes the patient was repeatedly asked to remain still. Multiple sequences are repeated. Motion decreases sensitivity and specificity of the exam. 2. There appears to be mild bone marrow edema involving the phalanges of the first and second digits without clear accompanying T1 signal changes. Findings are indeterminate for osteomyelitis. Subcutaneous edema of the first and second toes likely cellulitis. Limited sensitivity for abscess. Repeat MRI examination with the addition of gadolinium contrast when the patient is able to hold still, or three-phase bone scan may be considered if increased sensitivity and specificity is desired. 3. Muscle fatty atrophy suggesting presence of a polyneuropathy. Electronically signed by:  Froylan Bennett MD  9/7/2022 1:09 PM CDT Workstation: [id number]    US Arterial Doppler Lower Extremity Complete    Result Date: 9/7/2022  Moderate diffuse atherosclerotic irregularity with no evidence of any significant femoral or popliteal artery stenosis or occlusion. There appears to be three-vessel runoff bilaterally Electronically signed by:  Pablo Rubio MD  9/7/2022 11:52 PM CDT Workstation: [id number]        I reviewed the patient's new clinical results.    Assessment/Plan:     Active and Resolved Problems  Active Hospital Problems    Diagnosis  POA   • **Gangrene of both feet ([facility]) [I96]  Unknown   • Anemia [D64.9]  Yes   • ESRD on hemodialysis ([facility]) [N18.6, Z99.2]  Not Applicable   • Physical deconditioning [R53.81]  Yes   • Type 2 diabetes mellitus with autonomic neuropathy ([facility]) [E11.43]  Yes   • Cellulitis of foot  associated with diabetes mellitus (HCC) [E11.628, L03.119]  Yes   • MIKE and COPD overlap syndrome (HCC) [G47.33, J44.9]  Yes   • Hypothyroidism [E03.9]  Yes   • GERD (gastroesophageal reflux disease) [K21.9]  Yes   • Hypertension [I10]  Yes   • Mixed hyperlipidemia [E78.2]  Yes   • Coronary artery disease involving native coronary artery of native heart without angina pectoris [I25.10]  Yes      Resolved Hospital Problems   No resolved problems to display.       #Gangrene of both feet:  Known vasculopath and poorly controlled diabetic. Presented with leukocytosis and elevated CRP. Patient with a complex infectious disease history with known MRSA infection and previous CRE infection as well.   - Patient on Vancomycin and Zosyn - need for MRSA and Pseudomonas coverage - Pharmacy to help with dosage  - Podiatry consulted. Betadine on gangrenous digits as recommended by podiatry. Not a good surgical candidate  - CRP up-trending down from 34.1 to 18.68  - Sed rate elevated at 114 on admission, trending down to 100  - Bilateral MRI of the foot inconclusive, possible osteomyelitis. Was not able to get with contrast due to ESKD and patient was moving during imaging  - Patient has history of PAD. Arterial doppler of lower extremities with moderate atherosclerotic irregularity.     #Sepsis:  Patient has elevated leukocytosis however not febrile, heart rate has normalized and respiration rate normalized.    - Continue to monitor.    - Lactate was normal at 1.6.   - Blood culture pending     #Cellulitis:  - Above antibiotic coverage will likely cover for any possible cellulitis.   - Continue to monitor progression via visual exam and response to lab work.     #ESRD on dialysis:  - Patient is on dialysis 3 times a week (M/W/F)  - Last dialysis was 9/7/2022, dialysis today  - Creatinine slightly above baseline at 3.19 >1.75 > 2.71.  - Nephrology consulted for dialysis  - At current presentation, risks of not appropriately  treating sepsis/gangrene/cellulitis outweighs potential nephrotoxic nature of antibiotics listed above.    - Avoid other nephrotoxic agents.     #MIKE/COPD:   - Nasal cannula oxygen to maintain oxygen saturation 82 to 92%.   - Patient may use home trilogy machine.     #Hypothyroidism:   - Continue home levothyroxine 25 mcg dosage.     #Diabetes:  Patient reports home regimen of 40 units long-acting insulin twice a day and 40 units short acting insulin prandially.   - Patient started on cardiac/consistent carb/renal diet which is vastly different from her home dosage.    - Slight decrease in insulin at this time.   - Patient long term insulin changed to 36 units at night and 30 units with meals  - Rebylsus 7 mg restarted for better glucose control    #Anemia:   - Chronic and stable.    - Continue to monitor  - Restart epogen     #Hypertension:   - Restart home medications     #CAD  - Patient on atorvastatin, aspirin, plavix, and metoprolol tartrate. Restart home meds    DVT prophylaxis:  Medical DVT prophylaxis orders are present.     Code status is   Code Status and Medical Interventions:   Ordered at: 09/06/22 7810     Level Of Support Discussed With:    Patient     Code Status (Patient has no pulse and is not breathing):    CPR (Attempt to Resuscitate)     Medical Interventions (Patient has pulse or is breathing):    Full Support       Plan for disposition:Home with HH vs LTACH  in 2-3 days    Time: 30 minutes    Signature  Rianna Macias. MD  [facility]  [address] [place] [address]  Office: [phone]        This document has been electronically signed by Rianna Macias MD on September 9, 2022 08:20 CDT

## 2022-09-09 NOTE — THERAPY TREATMENT NOTE
Patient Name: Yamileth Slater  : 1960    MRN: [id number]                              Today's Date: 2022       Admit Date: 2022    Visit Dx:     ICD-10-CM ICD-9-CM   1. Sepsis without acute organ dysfunction, due to unspecified organism ([facility])  A41.9 038.9     995.91   2. Diabetic foot ulcer associated with type 2 diabetes mellitus, unspecified laterality, unspecified part of foot, unspecified ulcer stage ([facility])  E11.621 250.80    L97.509 707.15   3. Hyperglycemia  R73.9 790.29   4. Impaired physical mobility  Z74.09 781.99   5. Impaired mobility and ADLs  Z74.09 V49.89    Z78.9    6. Gangrene of both feet ([facility])  I96 785.4     Patient Active Problem List   Diagnosis   • Chronic midline low back pain without sciatica   • Vitamin D deficiency   • Tobacco dependence syndrome   • Shoulder joint pain   • Morbid obesity with BMI of 50.0-59.9, adult ([facility])   • Mixed hyperlipidemia   • Kidney stone   • History of colon polyps   • GERD (gastroesophageal reflux disease)   • Excoriated eczema   • Hypertension   • COPD (chronic obstructive pulmonary disease) ([facility])   • Chronic folliculitis   • Coronary artery disease involving native coronary artery of native heart without angina pectoris   • 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   • Hypothyroidism   • Carotid artery disease ([facility])   • [place] abuse   • PAD (peripheral artery disease) ([facility])   • Venous insufficiency   • LAFB (left anterior fascicular block)   • Pulmonary hypertension ([facility])   • Left hip pain   • Neck pain   • MIKE and COPD overlap syndrome ([facility])   • Pneumonia due to COVID-19 virus   • Hyperkalemia   • Cutaneous candidiasis   • Community acquired pneumonia of right middle lobe of lung   • MRSA infection   • Esophageal dysphagia   • Monilial esophagitis ([facility])   • NSTEMI, initial episode of care ([facility])   • Cellulitis of foot associated with diabetes mellitus ([facility])   • Urinary tract infection due to Proteus   • History of insertion  of tunneled central venous catheter (CVC) with port   • Anemia due to chronic kidney disease, on chronic dialysis ([facility])   • Pneumonitis   • Personal history of noncompliance with medical treatment, presenting hazards to health   • Type 2 diabetes mellitus with autonomic neuropathy ([facility])   • Physical deconditioning   • ESRD on hemodialysis ([facility])   • DVT prophylaxis   • Anemia   • Ventilator associated pneumonia ([facility])   • Positive fecal occult blood test   • Yeast UTI   • Gangrene of both feet ([facility])     Past Medical History:   Diagnosis Date   • Acute blood loss anemia 4/16/2017    Likely due to gastric oozing at this time. - Dr. Duarte (GI) was consulted and has now signed off, will follow up outpatient - pill colonoscopy showed AVMs - continue to monitor   • Acute cystitis with hematuria 3/31/2021    1/13: IV Rocephin 1 gm q 24 1/14 : transitioned  to omnicef 300mg. Urine cultures resulted and did not show growth. Omnicef discontinued as patient is asymptomatic   • Altered mental status 1/9/2022    - AMS on presentation - initial ABG pH 7.3, CO2 34 - Procal 0.29 - UA negative for acute cysitits -CTA head wnl  - Empiric Zosyn and Vancomycin -Lactate 2.5 on admission  - blood cultures no growth at 24 hours     • Anxiety    • CAD (coronary artery disease) 4/24/2021    S/P 3 stents 5/1/2021 for [facility] Continue ASA 81mg & Clopidogrel 75mg Continue Atorvastatin 40mg   • Carotid artery stenosis    • Chronic obstructive lung disease ([facility])    • CKD (chronic kidney disease) stage 4, GFR 15-29 ml/min ([facility])    • CKD (chronic kidney disease), symptom management only, stage 5 ([facility]) 10/5/2020    Results from last 7 days Lab Units 12/15/21 0548 12/14/21 1323 12/14/21 0916 CREATININE mg/dL 3.92* 3.21* 3.32*  Baseline creatinine 2-3 GFR 13-25 GFR 15 Dialysis MWF, sees Dr. Lauren Nephrology consult,, appreciate recommendations Continue Bumex 1mg bid daily Holding Bumex 2mg 4 times a week   • Colonic polyp    • Coronary arteriosclerosis     • Diabetes mellitus (HCC)    • Diabetic neuropathy (HCC)    • Ear pain, right 10/18/2021    - canal trauma due to patient scratching and DMT2 - added cortisporin ear drops   • Elevated troponin 10/12/2021    -most likely from CKD -Trending down -Neg chest pain   • Generalized abdominal pain 7/1/2022    Could be due to initiation of tube feeds vs dyspepsia vs abdominal cramps related to no PO intake due to intubation vs constipation Continue current laxative regiment  If no bowel movement by this afternoon will consider enema   • GERD (gastroesophageal reflux disease)    • GI bleed 5/13/2021    - GI will follow up outpatient - Protonix 40mg daily - Avoid medical DVT prophy and use mechanical at this time instead. - Continue to monitor - pill colonoscopy results showed AVMs   • History of transfusion    • Hypercholesterolemia    • Hyperosmolar hyperglycemic state (HHS) (HCC) 6/25/2022    Serum glucose 605 on admission  Anion gap 16 PH 7.37 Bicarb 27.9 Continue fluids  Insulin drip with HHS protocol  Anion gap closed around 10 AM, received one dose of Levamir subq, will stop insulin drip after 2 hrs     • Hypertension    • Hypokalemia 5/27/2022    Will replace as needed. Will be cautious in the setting of ESRD to avoid need for emergency dialysis   Monitor Qtc intervals on EKG     • Hypomagnesemia 6/27/2021    Monitor and replace   • Morbid obesity (HCC)    • Nephrolithiasis    • On mechanically assisted ventilation (HCC) 6/26/2022    Vent management and sedation orders placed.  - [facility] intensivist group consulted for vent management appreciate recommendations  - plan to extubate today     • Peripheral vascular disease (HCC)    • Pleural effusion on right 6/26/2022    CXR on 6/30/22 read as a small upper left pulmonary edema vs early pneumonia.  Last Echo 1/2022 EF 61-65 % Continue to monitor  Procal slightly improved, CRP improved On Linezolid and meropenum      • SIRS (systemic inflammatory response  syndrome) ([facility]) 1/9/2022    Admission  - WBC 17.78   -   - RR 16 - 1/10: VSS/wnl - CXR - Mild pulm edema - Blood cultures no growth at 24 hours  - Procalcitonin 0.29 - UA : glucose 1000, negative Leucocytes/nitrate - Empiric Zosyn and Vancomcyin    • Sleep apnea    • Substance abuse ([facility])    • Vitamin D deficiency      Past Surgical History:   Procedure Laterality Date   • CARDIAC CATHETERIZATION N/A 7/14/2020   • CARDIAC CATHETERIZATION N/A 4/23/2021    Procedure: Left Heart Cath;  Surgeon: Melba Romo MD;  Location: [facility] CATH INVASIVE LOCATION;  Service: Cardiology;  Laterality: N/A;   • CARDIAC CATHETERIZATION N/A 4/30/2021    Procedure: Percutaneous Coronary Intervention;  Surgeon: Russell Voss MD;  Location: [facility] CATH INVASIVE LOCATION;  Service: Cardiovascular;  Laterality: N/A;   • CARDIAC CATHETERIZATION N/A 4/30/2021    Procedure: Stent NIKKI coronary;  Surgeon: Russell Voss MD;  Location:  INVASIVE LOCATION;  Service: Cardiovascular;  Laterality: N/A;   • CARDIAC CATHETERIZATION Left 11/13/2021    Procedure: Left Heart Cath;  Surgeon: Niall Rios MD;  Location: [facility] CATH INVASIVE LOCATION;  Service: Cardiology;  Laterality: Left;   • CAROTID STENT Left    • COLONOSCOPY     • COLONOSCOPY N/A 5/14/2021    Procedure: COLONOSCOPY;  Surgeon: Mingo Duarte MD;  Location: [facility] ENDOSCOPY;  Service: Gastroenterology;  Laterality: N/A;   • CORONARY ARTERY BYPASS GRAFT N/A 2013    CABG X 3   • CYSTOSCOPY BLADDER STONE LITHOTRIPSY Bilateral    • ENDOSCOPY N/A 4/12/2021    Procedure: ESOPHAGOGASTRODUODENOSCOPY;  Surgeon: Mingo Duarte MD;  Location: [facility] ENDOSCOPY;  Service: Gastroenterology;  Laterality: N/A;   • ENDOSCOPY N/A 5/14/2021    Procedure: ESOPHAGOGASTRODUODENOSCOPY;  Surgeon: Mingo Duarte MD;  Location: [facility] ENDOSCOPY;  Service: Gastroenterology;  Laterality: N/A;   • ENDOSCOPY N/A 1/28/2022    Procedure:  ESOPHAGOGASTRODUODENOSCOPY;  Surgeon: Mingo Duarte MD;  Location: [facility] ENDOSCOPY;  Service: Gastroenterology;  Laterality: N/A;   • INTERVENTIONAL RADIOLOGY PROCEDURE N/A 10/21/2021    Procedure: tunneled central venous catheter placement;  Surgeon: Donnie Robles MD;  Location: [facility] ANGIO INVASIVE LOCATION;  Service: Interventional Radiology;  Laterality: N/A;   • INTERVENTIONAL RADIOLOGY PROCEDURE N/A 1/27/2022    Procedure: tunneled central venous catheter placement;  Surgeon: Donnie Robles MD;  Location: [facility] ANGIO INVASIVE LOCATION;  Service: Interventional Radiology;  Laterality: N/A;      General Information     Row Name 09/09/22 0955          OT Time and Intention    Document Type therapy note (daily note)  -LW     Mode of Treatment individual therapy;occupational therapy  -     Row Name 09/09/22 0955          General Information    Patient Profile Reviewed yes  -LW     Existing Precautions/Restrictions fall  -LW     Row Name 09/09/22 0955          Cognition    Orientation Status (Cognition) oriented x 4  -LW     Row Name 09/09/22 0955          Safety Issues, Functional Mobility    Impairments Affecting Function (Mobility) endurance/activity tolerance;pain;strength  -           User Key  (r) = Recorded By, (t) = Taken By, (c) = Cosigned By    Initials Name Provider Type    LW Rekha Perez COTA Occupational Therapist Assistant                 Mobility/ADL's     Row Name 09/09/22 0955          Bed Mobility    Bed Mobility rolling right;rolling left  -LW     Rolling Left [place] (Bed Mobility) modified independence  -LW     Rolling Right [place] (Bed Mobility) modified independence  -LW     Assistive Device (Bed Mobility) bed rails  -LW     Row Name 09/09/22 0955          Activities of Daily Living    BADL Assessment/Intervention bathing;upper body dressing;grooming  -LW     Row Name 09/09/22 0955          Grooming Assessment/Training    [place] Level  (Grooming) hair care, combing/brushing;wash face, hands  -     Position (Grooming) long sitting  -     Row Name 09/09/22 0955          Bathing Assessment/Intervention    [place] Level (Bathing) upper body;independent  -LW     Position (Bathing) long sitting  -     Row Name 09/09/22 0955          Upper Body Dressing Assessment/Training    [place] Level (Upper Body Dressing) doff;don;independent  Hospital gown  -     Position (Upper Body Dressing) long sitting  -           User Key  (r) = Recorded By, (t) = Taken By, (c) = Cosigned By    Initials Name Provider Type    Rekha Trotter COTA Occupational Therapist Assistant               Obj/Interventions     Row Name 09/09/22 0955          Shoulder (Therapeutic Exercise)    Shoulder (Therapeutic Exercise) strengthening exercise  -     Shoulder Strengthening (Therapeutic Exercise) bilateral;flexion;extension;aBduction;aDduction;external rotation;internal rotation;1 lb free weight;10 repetitions;2 sets;sitting  -     Row Name 09/09/22 0955          Elbow/Forearm (Therapeutic Exercise)    Elbow/Forearm (Therapeutic Exercise) strengthening exercise  -     Elbow/Forearm Strengthening (Therapeutic Exercise) bilateral;flexion;extension;supination;pronation;sitting;1 lb free weight;10 repetitions;2 sets  -     Row Name 09/09/22 0955          Wrist (Therapeutic Exercise)    Wrist (Therapeutic Exercise) strengthening exercise  -     Wrist Strengthening (Therapeutic Exercise) bilateral;flexion;extension;1 lb free weight;10 repetitions;2 sets  -     Row Name 09/09/22 0955          Motor Skills    Therapeutic Exercise shoulder;elbow/forearm;wrist  -           User Key  (r) = Recorded By, (t) = Taken By, (c) = Cosigned By    Initials Name Provider Type    Rekha Trotter COTA Occupational Therapist Assistant               Goals/Plan     Row Name 09/09/22 0955          Bathing Goal 1 (OT)    Activity/Device (Bathing Goal 1, OT) upper body  bathing;lower body bathing  -LW     [place] Level/Cues Needed (Bathing Goal 1, OT) set-up required;minimum assist (75% or more patient effort)  -LW     Time Frame (Bathing Goal 1, OT) long term goal (LTG);by discharge  -LW     Progress/Outcomes (Bathing Goal 1, OT) goal met   -LW     Row Name 09/09/22 0955          Dressing Goal 1 (OT)    Activity/Device (Dressing Goal 1, OT) upper body dressing  -LW     [place]/Cues Needed (Dressing Goal 1, OT) set-up required  -LW     Time Frame (Dressing Goal 1, OT) long term goal (LTG);by discharge  -LW     Progress/Outcome (Dressing Goal 1, OT) goal met   -LW     Row Name 09/09/22 0955          Strength Goal 1 (OT)    Strength Goal 1 (OT) Patient to perform BUE HEP with independence to increase strength and endurance to faciliate ADLs and transfers.  -LW     Time Frame (Strength Goal 1, OT) long term goal (LTG);by discharge  -LW     Progress/Outcome (Strength Goal 1, OT) goal not met  -LW           User Key  (r) = Recorded By, (t) = Taken By, (c) = Cosigned By    Initials Name Provider Type    LW Rekha Perez COTA Occupational Therapist Assistant               Clinical Impression     Row Name 09/09/22 0955          Pain Assessment    Pretreatment Pain Rating 5/10  -LW     Posttreatment Pain Rating 5/10  -LW     Pain Location - Side/Orientation Bilateral  -LW     Pain Location - toe  -LW     Pain Intervention(s) Medication (See MAR)  -LW     Row Name 09/09/22 0955          Plan of Care Review    Plan of Care Reviewed With patient  -LW     Progress improving  -LW     Outcome Evaluation Pt agreed to therapy. Supine in bed upon entry to room. UB bathing and dressing Independent. Grooming Independent. UB ROM with 1 lb wt 10X2. tolerated well. All needs in reach. Continue OT POC.  -LW     Row Name 09/09/22 0955          Positioning and Restraints    Pre-Treatment Position in bed  -LW     Post Treatment Position bed  -LW     In Bed fowlers;call light within  reach;encouraged to call for assist;exit alarm on  -LW           User Key  (r) = Recorded By, (t) = Taken By, (c) = Cosigned By    Initials Name Provider Type    Rekha Trotter COTA Occupational Therapist Assistant               Outcome Measures     Row Name 09/09/22 0955          How much help from another is currently needed...    Putting on and taking off regular lower body clothing? 1  -LW     Bathing (including washing, rinsing, and drying) 4  -LW     Toileting (which includes using toilet bed pan or urinal) 2  -LW     Putting on and taking off regular upper body clothing 4  -LW     Taking care of personal grooming (such as brushing teeth) 4  -LW     Eating meals 4  -LW     AM-PAC 6 Clicks Score (OT) 19  -LW     Row Name 09/09/22 0800          How much help from another person do you currently need...    Turning from your back to your side while in flat bed without using bedrails? 3  -EG     Moving from lying on back to sitting on the side of a flat bed without bedrails? 3  -EG     Moving to and from a bed to a chair (including a wheelchair)? 3  -EG     Standing up from a chair using your arms (e.g., wheelchair, bedside chair)? 3  -EG     Climbing 3-5 steps with a railing? 2  -EG     To walk in hospital room? 2  -EG     AM-PAC 6 Clicks Score (PT) 16  -EG     Highest level of mobility 5 --> Static standing  -EG           User Key  (r) = Recorded By, (t) = Taken By, (c) = Cosigned By    Initials Name Provider Type    Rekha Trotter COTA Occupational Therapist Assistant    EG Selena Vasquez RN Registered Nurse                Occupational Therapy Education                 Title: PT OT SLP Therapies (In Progress)     Topic: Occupational Therapy (Done)     Point: ADL training (Done)     Description:   Instruct learner(s) on proper safety adaptation and remediation techniques during self care or transfers.   Instruct in proper use of assistive devices.              Learning Progress Summary            Patient Acceptance, E,TB, VU by  at 9/9/2022 1447    Acceptance, E,TB, VU by  at 9/8/2022 1640    Comment: POC, role of OT, transfer training                   Point: Home exercise program (Done)     Description:   Instruct learner(s) on appropriate technique for monitoring, assisting and/or progressing therapeutic exercises/activities.              Learning Progress Summary           Patient Acceptance, E,TB, VU by  at 9/9/2022 1447                   Point: Precautions (Done)     Description:   Instruct learner(s) on prescribed precautions during self-care and functional transfers.              Learning Progress Summary           Patient Acceptance, E,TB, VU by  at 9/9/2022 1447    Acceptance, E,TB, VU by  at 9/8/2022 1640    Comment: POC, role of OT, transfer training                   Point: Body mechanics (Done)     Description:   Instruct learner(s) on proper positioning and spine alignment during self-care, functional mobility activities and/or exercises.              Learning Progress Summary           Patient Acceptance, E,TB, VU by  at 9/9/2022 1447    Acceptance, E,TB, VU by  at 9/8/2022 1640    Comment: POC, role of OT, transfer training                               User Key     Initials Effective Dates Name Provider Type Discipline     06/16/21 -  Rkeha Perez COTA Occupational Therapist Assistant OT     06/14/21 -  Ottoniel Robles OT Occupational Therapist OT              OT Recommendation and Plan     Plan of Care Review  Plan of Care Reviewed With: patient  Progress: improving  Outcome Evaluation: Pt agreed to therapy. Supine in bed upon entry to room. UB bathing and dressing Independent. Grooming Independent. UB ROM with 1 lb wt 10X2. tolerated well. All needs in reach. Continue OT POC.     Time Calculation:    Time Calculation- OT     Row Name 09/09/22 0955             Time Calculation- OT    OT Start Time 0955  -LW      OT Stop Time 1050  -LW      OT Time Calculation  (min) 55 min  -LW      Total Timed Code Minutes- OT 55 minute(s)  -LW      OT Received On 09/09/22  -LW              Timed Charges    57124 - OT Therapeutic Exercise Minutes 25  -LW      48321 - OT Self Care/Mgmt Minutes 30  -LW              Total Minutes    Timed Charges Total Minutes 55  -LW       Total Minutes 55  -LW            User Key  (r) = Recorded By, (t) = Taken By, (c) = Cosigned By    Initials Name Provider Type    Rekha Trotter COTA Occupational Therapist Assistant              Therapy Charges for Today     Code Description Service Date Service Provider Modifiers Qty    54530690587 HC OT THER PROC EA 15 MIN 9/9/2022 Rekha Perez COTA GO 2    38585236744 HC OT SELF CARE/MGMT/TRAIN EA 15 MIN 9/9/2022 Rekha Perez COTA GO 2               ISHAN Cosme  9/9/2022

## 2022-09-09 NOTE — PLAN OF CARE
Goal Outcome Evaluation:  Plan of Care Reviewed With: patient        Progress: improving Alert and oriented x4.Medicated for pain x1 with relief voiced.Continues with red feet and necrotic  toes.Redness is noted to be much better in the legs.N/V this am with repositioning-medicated with relief voiced. Rested well through the night.

## 2022-09-09 NOTE — DISCHARGE SUMMARY
DISCHARGE SUMMARY    PATIENT NAME: Yamileth Slater   PHYSICIAN: Rianna Macias MD  : 1960  MRN: [id number]    ADMITTED: 2022     DISCHARGED: 22      ADMITTING DIAGNOSIS:  Hyperglycemia [R73.9]  Diabetic foot ulcer associated with type 2 diabetes mellitus, unspecified laterality, unspecified part of foot, unspecified ulcer stage (HCC) [E11.621, L97.509]  Sepsis without acute organ dysfunction, due to unspecified organism (HCC) [A41.9]      DISCHARGE, AND RESOLVED DIAGNOSES:  Active Hospital Problems    Diagnosis  POA    **Gangrene of both feet ([facility]) [I96]  Unknown    Anemia [D64.9]  Yes    ESRD on hemodialysis ([facility]) [N18.6, Z99.2]  Not Applicable    Physical deconditioning [R53.81]  Yes    Type 2 diabetes mellitus with autonomic neuropathy ([facility]) [E11.43]  Yes    Cellulitis of foot associated with diabetes mellitus (HCC) [E11.628, L03.119]  Yes    MIKE and COPD overlap syndrome (HCC) [G47.33, J44.9]  Yes    Hypothyroidism [E03.9]  Yes    GERD (gastroesophageal reflux disease) [K21.9]  Yes    Hypertension [I10]  Yes    Mixed hyperlipidemia [E78.2]  Yes    Coronary artery disease involving native coronary artery of native heart without angina pectoris [I25.10]  Yes      Resolved Hospital Problems   No resolved problems to display.         SERVICE: Family Medicine Residency  Attending: Dr. Alex Wells  Resident: Rianna Macias MD    CONSULTS:   Consult Orders (all) (From admission, onward)       Start     Ordered    22 0813  Inpatient Podiatry Consult  Once        Specialty:  Podiatry  Provider:  (Not yet assigned)    22 0814    22 0805  Inpatient Nephrology Consult  Once        Specialty:  Nephrology  Provider:  Ace Lauren MD    22 0805    22 0248  Inpatient Nutrition Consult  Once        Provider:  (Not yet assigned)    22 0249    22 0143  Inpatient Case Management  Consult  Once        Provider:  (Not yet assigned)     "09/07/22 0143                    PROCEDURES:   None    HISTORY OF PRESENT ILLNESS:   Per the H&P written by Dr. Coffman, dated 9/9/22:  \"Yamileth Slater is a 62 y.o. female with a PMH of ESRD on dialysis MWF, diabetes mellitus, morbid obesity, CAD s/p stents, hyperlipidemia, MIKE/COPD, hypothyroidism   who presents with worsening toe wounds.  Patient initially noticed wound on left second toe and right great toe approximately 3 weeks ago.  Home health has been intermittently coming and helping with topical medications.  Patient has not been on any antibiotics for these infections.  Over the same time patient's blood sugars been difficult to control.  Most recently over the last couple of weeks its been ranging between 400 and 500.  Wounds are on the ventral aspect of the toes mentioned above.  The left side will intermittently drain/bleed some.  Recently they noticed that the color has gone from red to purple/black.  Patient does not have sensation her feet.  Recently as well they have noted erythema on the left leg and some streaking on the right.     In the emergency department, exam revealed purple to black coloration on toes listed above.  CRP was elevated.  Patient had a white blood cell count.  Initially she was tachycardic and triggered sepsis protocol.  Patient was due to get dialysis yesterday but did not receive it secondary to the holiday.  Patient's blood sugar was 549 on CMP.  Creatinine was 3.18 with a EGFR of 15.8 which is slightly above baseline for her.  X-ray of both feet did not reveal any osteomyelitis.  Patient was admitted for sepsis and gangrene of 2 toes.\"    DIAGNOSTIC DATA:   Lab Results (last 72 hours)       Procedure Component Value Units Date/Time    Comprehensive Metabolic Panel [[id number]]  (Abnormal) Collected: 09/09/22 0626    Specimen: Blood Updated: 09/09/22 0746     Glucose 278 mg/dL      BUN 24 mg/dL      Creatinine 2.71 mg/dL      Sodium 131 mmol/L      Potassium 3.6 mmol/L  "     Chloride 92 mmol/L      CO2 29.0 mmol/L      Calcium 8.5 mg/dL      Total Protein 7.1 g/dL      Albumin 2.60 g/dL      ALT (SGPT) <5 U/L      AST (SGOT) 6 U/L      Alkaline Phosphatase 158 U/L      Total Bilirubin 0.3 mg/dL      Globulin 4.5 gm/dL      A/G Ratio 0.6 g/dL      BUN/Creatinine Ratio 8.9     Anion Gap 10.0 mmol/L      eGFR 19.2 mL/min/1.73      Comment: National Kidney Foundation and American Society of Nephrology (ASN) Task Force recommended calculation based on the Chronic Kidney Disease Epidemiology Collaboration (CKD-EPI) equation refit without adjustment for race.       Narrative:      GFR Normal >60  Chronic Kidney Disease <60  Kidney Failure <15      POC Glucose Once [[id number]]  (Abnormal) Collected: 09/09/22 0727    Specimen: Blood Updated: 09/09/22 0745     Glucose 295 mg/dL      Comment: RN NotifiedOperator: [id number] [facility] HEATHERMeter ID: [id number]       Vancomycin, Random [[id number]]  (Normal) Collected: 09/09/22 0626    Specimen: Blood Updated: 09/09/22 0745     Vancomycin Random 20.10 mcg/mL     C-reactive Protein [[id number]]  (Abnormal) Collected: 09/09/22 0626    Specimen: Blood Updated: 09/09/22 0745     C-Reactive Protein 18.68 mg/dL     Sedimentation Rate [[id number]]  (Abnormal) Collected: 09/09/22 0626    Specimen: Blood Updated: 09/09/22 0731     Sed Rate 100 mm/hr     CBC Auto Differential [[id number]]  (Abnormal) Collected: 09/09/22 0626    Specimen: Blood Updated: 09/09/22 0723     WBC 11.01 10*3/mm3      RBC 3.66 10*6/mm3      Hemoglobin 9.8 g/dL      Hematocrit 31.7 %      MCV 86.6 fL      MCH 26.8 pg      MCHC 30.9 g/dL      RDW 16.0 %      RDW-SD 51.1 fl      MPV 8.8 fL      Platelets 286 10*3/mm3      Neutrophil % 78.1 %      Lymphocyte % 11.4 %      Monocyte % 7.4 %      Eosinophil % 1.7 %      Basophil % 0.7 %      Immature Grans % 0.7 %      Neutrophils, Absolute 8.58 10*3/mm3      Lymphocytes, Absolute 1.26 10*3/mm3      Monocytes, Absolute 0.82 10*3/mm3       Eosinophils, Absolute 0.19 10*3/mm3      Basophils, Absolute 0.08 10*3/mm3      Immature Grans, Absolute 0.08 10*3/mm3      nRBC 0.0 /100 WBC     Blood Culture - Blood, Arm, Left [[id number]]  (Normal) Collected: 09/06/22 2058    Specimen: Blood from Arm, Left Updated: 09/08/22 2115     Blood Culture No growth at 2 days    POC Glucose Once [[id number]]  (Abnormal) Collected: 09/08/22 1901    Specimen: Blood Updated: 09/08/22 1925     Glucose 290 mg/dL      Comment: RN NotifiedOperator: [id number] RICHARDSON Mtzter ID: [id number]       POC Glucose Once [[id number]]  (Abnormal) Collected: 09/08/22 1635    Specimen: Blood Updated: 09/08/22 1652     Glucose 192 mg/dL      Comment: RN NotifiedOperator: [id number] RICHA Welch ID: [id number]       POC Glucose Once [[id number]]  (Normal) Collected: 09/08/22 1110    Specimen: Blood Updated: 09/08/22 1133     Glucose 127 mg/dL      Comment: : [id number] EKTA CARLSONAMeter ID: [id number]       POC Glucose Once [[id number]]  (Normal) Collected: 09/08/22 0745    Specimen: Blood Updated: 09/08/22 0811     Glucose 91 mg/dL      Comment: : [id number] EKTA CARLSONAMeter ID: [id number]       POC Glucose Once [[id number]]  (Abnormal) Collected: 09/07/22 1802    Specimen: Blood Updated: 09/08/22 0803     Glucose 58 mg/dL      Comment: : [id number] NIRANJAN Carson ID: [id number]       POC Glucose Once [[id number]]  (Abnormal) Collected: 09/07/22 1800    Specimen: Blood Updated: 09/08/22 0803     Glucose 56 mg/dL      Comment: : [id number] NIRANJAN LEEHMeter ID: [id number]       POC Glucose Once [[id number]]  (Abnormal) Collected: 09/07/22 1659    Specimen: Blood Updated: 09/08/22 0803     Glucose 54 mg/dL      Comment: RN NotifiedOperator: [id number] BRANNON Hillster ID: [id number]       POC Glucose Once [[id number]]  (Normal) Collected: 09/08/22 0624    Specimen: Blood Updated: 09/08/22 0703     Glucose 93 mg/dL      Comment: :  [id number] KHRIS Randhawa ID: [id number]       Comprehensive Metabolic Panel [[id number]]  (Abnormal) Collected: 09/08/22 0522    Specimen: Blood Updated: 09/08/22 0655     Glucose 70 mg/dL      BUN 18 mg/dL      Creatinine 1.75 mg/dL      Sodium 136 mmol/L      Potassium 3.4 mmol/L      Chloride 97 mmol/L      CO2 31.0 mmol/L      Calcium 8.7 mg/dL      Total Protein 7.5 g/dL      Albumin 2.90 g/dL      ALT (SGPT) <5 U/L      AST (SGOT) 8 U/L      Alkaline Phosphatase 139 U/L      Total Bilirubin 0.3 mg/dL      Globulin 4.6 gm/dL      A/G Ratio 0.6 g/dL      BUN/Creatinine Ratio 10.3     Anion Gap 8.0 mmol/L      eGFR 32.4 mL/min/1.73      Comment: National Kidney Foundation and American Society of Nephrology (ASN) Task Force recommended calculation based on the Chronic Kidney Disease Epidemiology Collaboration (CKD-EPI) equation refit without adjustment for race.       Narrative:      GFR Normal >60  Chronic Kidney Disease <60  Kidney Failure <15      CBC Auto Differential [[id number]]  (Abnormal) Collected: 09/08/22 0522    Specimen: Blood Updated: 09/08/22 0612     WBC 10.01 10*3/mm3      RBC 3.53 10*6/mm3      Hemoglobin 9.5 g/dL      Hematocrit 30.0 %      MCV 85.0 fL      MCH 26.9 pg      MCHC 31.7 g/dL      RDW 15.9 %      RDW-SD 50.1 fl      MPV 8.8 fL      Platelets 247 10*3/mm3      Neutrophil % 73.5 %      Lymphocyte % 15.7 %      Monocyte % 7.7 %      Eosinophil % 2.0 %      Basophil % 0.6 %      Immature Grans % 0.5 %      Neutrophils, Absolute 7.36 10*3/mm3      Lymphocytes, Absolute 1.57 10*3/mm3      Monocytes, Absolute 0.77 10*3/mm3      Eosinophils, Absolute 0.20 10*3/mm3      Basophils, Absolute 0.06 10*3/mm3      Immature Grans, Absolute 0.05 10*3/mm3      nRBC 0.0 /100 WBC     POC Glucose Once [[id number]]  (Normal) Collected: 09/08/22 0114    Specimen: Blood Updated: 09/08/22 0128     Glucose 118 mg/dL      Comment: RN NotifiedOperator: [id number] KHRIS Randhawa ID: [id number]        POC Glucose Once [[id number]]  (Normal) Collected: 09/07/22 2246    Specimen: Blood Updated: 09/07/22 2300     Glucose 93 mg/dL      Comment: : [id number] KHRIS MORGANMeter ID: [id number]       POC Glucose Once [[id number]]  (Normal) Collected: 09/07/22 2027    Specimen: Blood Updated: 09/07/22 2239     Glucose 118 mg/dL      Comment: RN NotifiedOperator: [id number] KHRIS MORGANMeter ID: [id number]       POC Glucose Once [[id number]]  (Normal) Collected: 09/07/22 1941    Specimen: Blood Updated: 09/07/22 2028     Glucose 72 mg/dL      Comment: : [id number] JYOTI CONTRERASNATAMeter ID: [id number]       POC Glucose Once [[id number]]  (Normal) Collected: 09/07/22 1902    Specimen: Blood Updated: 09/07/22 1915     Glucose 82 mg/dL      Comment: : [id number] NIRANJAN BETHMeter ID: [id number]       POC Glucose Once [[id number]]  (Abnormal) Collected: 09/07/22 1826    Specimen: Blood Updated: 09/07/22 1914     Glucose 65 mg/dL      Comment: : [id number] NIRANJAN LEEHMeter ID: [id number]       POC Glucose Once [[id number]]  (Abnormal) Collected: 09/07/22 1741    Specimen: Blood Updated: 09/07/22 1819     Glucose 56 mg/dL      Comment: Result Not ConfirmedOperator: [id number] NIRANJAN StyleFactoryHMeter ID: [id number]       POC Glucose Once [[id number]]  (Abnormal) Collected: 09/07/22 1725    Specimen: Blood Updated: 09/07/22 1737     Glucose 53 mg/dL      Comment: RN NotifiedOperator: [id number] BRANNON Collins ID: [id number]       Vancomycin, Random [[id number]]  (Normal) Collected: 09/07/22 1325    Specimen: Blood Updated: 09/07/22 1355     Vancomycin Random 18.00 mcg/mL     Hepatitis B Surface Antigen [[id number]]  (Normal) Collected: 09/06/22 2337    Specimen: Blood Updated: 09/07/22 1305     Hepatitis B Surface Ag Non-Reactive    POC Glucose Once [[id number]]  (Abnormal) Collected: 09/07/22 1028    Specimen: Blood Updated: 09/07/22 1113     Glucose 317 mg/dL      Comment: RN  NotifiedOperator: [id number] BRANNON LAKHANIANNAMeter ID: [id number]       POC Glucose Once [[id number]]  (Abnormal) Collected: 09/07/22 0846    Specimen: Blood Updated: 09/07/22 1001     Glucose 470 mg/dL      Comment: RN NotifiedOperator: [id number] BRANNON LAKHANIANNAMeter ID: [id number]       POC Glucose Once [[id number]]  (Abnormal) Collected: 09/07/22 0709    Specimen: Blood Updated: 09/07/22 1001     Glucose 457 mg/dL      Comment: RN NotifiedOperator: [id number] BRANNON TOMLINSON'ANNAMeter ID: [id number]       POC Glucose Once [[id number]]  (Abnormal) Collected: 09/06/22 2346    Specimen: Blood Updated: 09/07/22 0950     Glucose 485 mg/dL      Comment: RN NotifiedOperator: [phone] [facility] OPALMeter ID: [id number]       Hemoglobin A1c [[id number]]  (Abnormal) Collected: 09/07/22 0650    Specimen: Blood Updated: 09/07/22 0912     Hemoglobin A1C 11.00 %     Narrative:      Hemoglobin A1C Ranges:    Increased Risk for Diabetes  5.7% to 6.4%  Diabetes                     >= 6.5%  Diabetic Goal                < 7.0%    Comprehensive Metabolic Panel [[id number]]  (Abnormal) Collected: 09/07/22 0650    Specimen: Blood Updated: 09/07/22 0824     Glucose 452 mg/dL      BUN 42 mg/dL      Creatinine 3.19 mg/dL      Sodium 130 mmol/L      Potassium 3.7 mmol/L      Chloride 92 mmol/L      CO2 28.0 mmol/L      Calcium 8.8 mg/dL      Total Protein 7.6 g/dL      Albumin 2.60 g/dL      ALT (SGPT) <5 U/L      AST (SGOT) 7 U/L      Alkaline Phosphatase 172 U/L      Total Bilirubin 0.4 mg/dL      Globulin 5.0 gm/dL      A/G Ratio 0.5 g/dL      BUN/Creatinine Ratio 13.2     Anion Gap 10.0 mmol/L      eGFR 15.8 mL/min/1.73      Comment: National Kidney Foundation and American Society of Nephrology (ASN) Task Force recommended calculation based on the Chronic Kidney Disease Epidemiology Collaboration (CKD-EPI) equation refit without adjustment for race.       Narrative:      GFR Normal >60  Chronic Kidney Disease <60  Kidney Failure <15       Lipid Panel [[id number]]  (Abnormal) Collected: 09/07/22 0650    Specimen: Blood Updated: 09/07/22 0814     Total Cholesterol 124 mg/dL      Triglycerides 173 mg/dL      HDL Cholesterol 39 mg/dL      LDL Cholesterol  56 mg/dL      VLDL Cholesterol 29 mg/dL      LDL/HDL Ratio 1.29    Narrative:      Cholesterol Reference Ranges  (U.S. Department of Health and Human Services ATP III Classifications)    Desirable          <200 mg/dL  Borderline High    200-239 mg/dL  High Risk          >240 mg/dL      Triglyceride Reference Ranges  (U.S. Department of Health and Human Services ATP III Classifications)    Normal           <150 mg/dL  Borderline High  150-199 mg/dL  High             200-499 mg/dL  Very High        >500 mg/dL    HDL Reference Ranges  (U.S. Department of Health and Human Services ATP III Classifications)    Low     <40 mg/dl (major risk factor for CHD)  High    >60 mg/dl ('negative' risk factor for CHD)        LDL Reference Ranges  (U.S. Department of Health and Human Services ATP III Classifications)    Optimal          <100 mg/dL  Near Optimal     100-129 mg/dL  Borderline High  130-159 mg/dL  High             160-189 mg/dL  Very High        >189 mg/dL    C-reactive Protein [[id number]]  (Abnormal) Collected: 09/07/22 0650    Specimen: Blood Updated: 09/07/22 0814     C-Reactive Protein 34.10 mg/dL     Protime-INR [[id number]]  (Abnormal) Collected: 09/07/22 0650    Specimen: Blood Updated: 09/07/22 0716     Protime 15.6 Seconds      INR 1.25    Narrative:      Therapeutic range for most indications is 2.0-3.0 INR,  or 2.5-3.5 for mechanical heart valves.    CBC Auto Differential [[id number]]  (Abnormal) Collected: 09/07/22 0651    Specimen: Blood Updated: 09/07/22 0701     WBC 12.47 10*3/mm3      RBC 3.59 10*6/mm3      Hemoglobin 9.7 g/dL      Hematocrit 31.6 %      MCV 88.0 fL      MCH 27.0 pg      MCHC 30.7 g/dL      RDW 16.2 %      RDW-SD 51.9 fl      MPV 8.9 fL      Platelets 230 10*3/mm3       Neutrophil % 78.3 %      Lymphocyte % 12.4 %      Monocyte % 7.1 %      Eosinophil % 1.0 %      Basophil % 0.6 %      Immature Grans % 0.6 %      Neutrophils, Absolute 9.74 10*3/mm3      Lymphocytes, Absolute 1.55 10*3/mm3      Monocytes, Absolute 0.89 10*3/mm3      Eosinophils, Absolute 0.13 10*3/mm3      Basophils, Absolute 0.08 10*3/mm3      Immature Grans, Absolute 0.08 10*3/mm3      nRBC 0.0 /100 WBC     [place] Draw [[id number]] Collected: 09/06/22 2058    Specimen: Blood Updated: 09/07/22 0048    Narrative:      The following orders were created for panel order [place] Draw.  Procedure                               Abnormality         Status                     ---------                               -----------         ------                     Green Top (Gel)[[id number]]                                  Final result               Lavender Top[[id number]]                                     Final result               Gold Top - SST[[id number]]                                   Final result               Light Blue Top[[id number]]                                   Final result                 Please view results for these tests on the individual orders.    Gold Top - SST [[id number]] Collected: 09/06/22 2337    Specimen: Blood Updated: 09/07/22 0048     Extra Tube Hold for add-ons.     Comment: Auto resulted.       Green Top (Gel) [[id number]] Collected: 09/06/22 2058    Specimen: Blood Updated: 09/06/22 2202     Extra Tube Hold for add-ons.     Comment: Auto resulted.       Lavender Top [[id number]] Collected: 09/06/22 2058    Specimen: Blood Updated: 09/06/22 2202     Extra Tube hold for add-on     Comment: Auto resulted       Light Blue Top [[id number]] Collected: 09/06/22 2058    Specimen: Blood Updated: 09/06/22 2202     Extra Tube Hold for add-ons.     Comment: Auto resulted       Comprehensive Metabolic Panel [[id number]]  (Abnormal) Collected: 09/06/22 2058    Specimen: Blood Updated: 09/06/22 2127     Glucose  549 mg/dL      BUN 43 mg/dL      Creatinine 3.18 mg/dL      Sodium 129 mmol/L      Potassium 3.7 mmol/L      Chloride 89 mmol/L      CO2 28.0 mmol/L      Calcium 9.1 mg/dL      Total Protein 8.1 g/dL      Albumin 3.10 g/dL      ALT (SGPT) 5 U/L      AST (SGOT) 9 U/L      Alkaline Phosphatase 194 U/L      Total Bilirubin 0.4 mg/dL      Globulin 5.0 gm/dL      A/G Ratio 0.6 g/dL      BUN/Creatinine Ratio 13.5     Anion Gap 12.0 mmol/L      eGFR 15.8 mL/min/1.73      Comment: National Kidney Foundation and American Society of Nephrology (ASN) Task Force recommended calculation based on the Chronic Kidney Disease Epidemiology Collaboration (CKD-EPI) equation refit without adjustment for race.       Narrative:      GFR Normal >60  Chronic Kidney Disease <60  Kidney Failure <15      C-reactive Protein [[id number]]  (Abnormal) Collected: 09/06/22 2058    Specimen: Blood Updated: 09/06/22 2124     C-Reactive Protein 32.67 mg/dL     Lactic Acid, Plasma [[id number]]  (Normal) Collected: 09/06/22 2058    Specimen: Blood Updated: 09/06/22 2120     Lactate 1.6 mmol/L     Sedimentation Rate [[id number]]  (Abnormal) Collected: 09/06/22 2058    Specimen: Blood Updated: 09/06/22 2110     Sed Rate 114 mm/hr     CBC & Differential [[id number]]  (Abnormal) Collected: 09/06/22 2058    Specimen: Blood Updated: 09/06/22 2106    Narrative:      The following orders were created for panel order CBC & Differential.  Procedure                               Abnormality         Status                     ---------                               -----------         ------                     CBC Auto Differential[[id number]]        Abnormal            Final result                 Please view results for these tests on the individual orders.    CBC Auto Differential [[id number]]  (Abnormal) Collected: 09/06/22 2058    Specimen: Blood Updated: 09/06/22 2106     WBC 14.21 10*3/mm3      RBC 3.91 10*6/mm3      Hemoglobin 10.4 g/dL      Hematocrit 34.0 %       MCV 87.0 fL      MCH 26.6 pg      MCHC 30.6 g/dL      RDW 16.4 %      RDW-SD 51.9 fl      MPV 8.8 fL      Platelets 224 10*3/mm3      Neutrophil % 84.5 %      Lymphocyte % 7.2 %      Monocyte % 6.3 %      Eosinophil % 0.4 %      Basophil % 0.6 %      Immature Grans % 1.0 %      Neutrophils, Absolute 12.01 10*3/mm3      Lymphocytes, Absolute 1.02 10*3/mm3      Monocytes, Absolute 0.90 10*3/mm3      Eosinophils, Absolute 0.05 10*3/mm3      Basophils, Absolute 0.09 10*3/mm3      Immature Grans, Absolute 0.14 10*3/mm3      nRBC 0.0 /100 WBC              MRI Foot Right Without Contrast    Result Date: 9/7/2022  Impression: 1. Severe motion degradation. The technologist notes the patient was repeatedly asked to remain still. Multiple sequences are repeated. Motion decreases sensitivity and specificity of the exam. 2. There appears to be mild bone marrow edema involving the phalanges of the first and second digits without clear accompanying T1 signal changes. Findings are indeterminate for osteomyelitis. Subcutaneous edema of the first and second toes likely cellulitis. Limited sensitivity for abscess. Repeat MRI examination with the addition of gadolinium contrast when the patient is able to hold still, or three-phase bone scan may be considered if increased sensitivity and specificity is desired. 3. Muscle fatty atrophy suggesting presence of a polyneuropathy. Electronically signed by:  Froylan Bennett MD  9/7/2022 1:09 PM CDT Workstation: [id number]    US Arterial Doppler Lower Extremity Complete    Result Date: 9/7/2022  Moderate diffuse atherosclerotic irregularity with no evidence of any significant femoral or popliteal artery stenosis or occlusion. There appears to be three-vessel runoff bilaterally Electronically signed by:  Pablo Rubio MD  9/7/2022 11:52 PM CDT Workstation: [id number]    XR Foot 3+ View Bilateral    Result Date: 9/6/2022  No definite plain radiographic evidence of osteomyelitis in either foot. If there is  high clinical concern consider MRI. Electronically signed by:  Eusebio Sargent  9/6/2022 9:05 PM CDT Workstation: [id number]       HOSPITAL COURSE:  #Gangrene of both feet:  Patient is a known vasculopath and poorly controlled diabetic. Presented with leukocytosis and elevated CRP, in the setting of ulcers on the right 1st and second toe and left 2nd toe.  Patient with a complex infectious disease history with known MRSA infection and previous CRE infection as well. Patient was started on vancomycin and zosyn for MRSA and pseudomonas coverage. Podiatry was consulted and stated that she is a poor surgical candidate and recommended betadine and continue current antibiotics. CRP and sed rate has been trending down since admission. Patient requested to be transfer to a facility with ID for further management.     #ESRD on dialysis:  Patient is on dialysis on M/W/F. She got dialysis on 9/7/22 and will get dyialysis today before being discharged. Patient follows up nephrology.   Due to the current presentation, risks of not appropriately treating gangrene/cellulitis outweighs potential nephrotoxic nature of vancomycin and zosyn.     #MIKE/COPD:   Patient uses trilogy machine at nights, and she uses 2-3 L NC at home. She received duobeds Q4H/PRN while admitted.      #Hypothyroidism:   Patient was continued on levothyroxine 25 MCG.     #Diabetes:  Patient is a known poor compliant. She reports home regimen of 40 units long-acting insulin twice a day and 40 units short acting insulin prandially. She also takes rybelsus 7 mg daily, which was continued while hospitalized.      #Anemia:   Patient has chronic anemia. Her Hb is usually around 9.5. It ws stable while hospitalized. She receives epogen 5000 units on the days she get dialysis.       DISCHARGE CONDITION:   Stable    DISPOSITION:  [facility] [facility]    DISCHARGE MEDICATIONS     Discharge Medications        New Medications         "Instructions Start Date   dextrose 50 % solution  Commonly known as: D50W   25 g, Intravenous, Every 15 Minutes PRN      morphine 2 MG/ML injection   1 mg, Intravenous, Every 4 Hours PRN      naloxone 0.4 MG/ML injection  Commonly known as: NARCAN   0.4 mg, Intravenous, Every 5 Minutes PRN      piperacillin-tazobactam  Commonly known as: ZOSYN   4.5 g, Intravenous, Every 12 Hours   Start Date: September 10, 2022     vancomycin   500 mg, Intravenous, Once             Continue These Medications        Instructions Start Date   acetaminophen 650 MG 8 hr tablet  Commonly known as: Tylenol 8 Hour   650 mg, Oral, Every 8 Hours PRN      Adult Aspirin Regimen 81 MG EC tablet  Generic drug: aspirin   81 mg, Oral, Daily      albuterol sulfate  (90 Base) MCG/ACT inhaler  Commonly known as: PROVENTIL HFA;VENTOLIN HFA;PROAIR HFA   2 puffs, Inhalation, Every 4 Hours PRN      albuterol (2.5 MG/3ML) 0.083% nebulizer solution  Commonly known as: PROVENTIL   Inhale the contents of 1 vial by nebulization Every 4 (Four) Hours As Needed for Wheezing.      atorvastatin 20 MG tablet  Commonly known as: LIPITOR   20 mg, Oral, Every Night at Bedtime      bumetanide 2 MG tablet  Commonly known as: BUMEX   Take 1 tablet by mouth 4 (Four) Times a Week. Take on non-hemodialysis days.      Capsaicin 0.035 % cream   3 g, Apply externally, 3 Times Daily PRN      Cetirizine HCl 10 MG capsule   1 capsule, Oral, Daily      clopidogrel 75 MG tablet  Commonly known as: PLAVIX   75 mg, Oral, Daily      [employer] Blood Glucose Meter w/Device kit   1 each, Does not apply, 3 Times Daily      Diclofenac Sodium 1 % gel gel  Commonly known as: VOLTAREN   4 g, Topical, 4 Times Daily PRN      DULoxetine 20 MG capsule  Commonly known as: CYMBALTA   40 mg, Oral, Daily      Easy Touch Insulin Syringe 30G X 5/16\" 0.5 ML misc  Generic drug: Insulin Syringe-Needle U-100   USE AS DIRECTED WITH LEVEMIR      Easy Touch Pen Needles 31G X 8 MM misc  Generic drug: Insulin " Pen Needle   No dose, route, or frequency recorded.      NovoFine 30G X 8 MM misc  Generic drug: Insulin Pen Needle   As directed 4 times daily      B-D ULTRAFINE III SHORT PEN 31G X 8 MM misc  Generic drug: Insulin Pen Needle   Use to inject insulin 4 (Four) Times a Day as directed.      gabapentin 300 MG capsule  Commonly known as: NEURONTIN   TAKE 1 CAPSULE BY MOUTH DAILY. AND AN EXTRA PILL MONDAY/WEDNESDAY/FRIDAY - DIALYSIS DAYS      glucose blood test strip   Use as instructed      insulin detemir 100 UNIT/ML injection  Commonly known as: LEVEMIR   25 Units, Subcutaneous, Nightly      Insulin Lispro (1 Unit Dial) 100 UNIT/ML solution pen-injector  Commonly known as: HUMALOG   12 Units, Subcutaneous, 3 Times Daily With Meals      ipratropium-albuterol 0.5-2.5 mg/3 ml nebulizer  Commonly known as: DUO-NEB   3 mL, Nebulization, 4 Times Daily - RT      isosorbide mononitrate 30 MG 24 hr tablet  Commonly known as: IMDUR   30 mg, Oral, Every Morning      levothyroxine 25 MCG tablet  Commonly known as: SYNTHROID, LEVOTHROID   25 mcg, Oral, Daily      lisinopril 20 MG tablet  Commonly known as: PRINIVIL,ZESTRIL   20 mg, Oral, Daily      metoprolol tartrate 25 MG tablet  Commonly known as: LOPRESSOR   25 mg, Oral, Every 12 Hours Scheduled      MIRCERA IJ   150 mcg, Every 14 Days      MIRCERA IJ   225 mcg, Every 14 Days      montelukast 10 MG tablet  Commonly known as: SINGULAIR   10 mg, Oral, Nightly      muscle rub 10-15 % cream cream   Apply 1 application topically to the appropriate area as directed 4 (Four) Times a Day As Needed for buttock pain.      nitroglycerin 0.4 MG SL tablet  Commonly known as: NITROSTAT   0.4 mg, Sublingual, Every 5 Minutes PRN      O2  Commonly known as: OXYGEN   3 L/min, Inhalation, Continuous      ondansetron ODT 4 MG disintegrating tablet  Commonly known as: Zofran ODT   4 mg, Translingual, Every 8 Hours PRN      oxybutynin XL 5 MG 24 hr tablet  Commonly known as: DITROPAN-XL   5 mg,  Oral, Daily      pantoprazole 40 MG EC tablet  Commonly known as: PROTONIX   40 mg, Oral, Nightly      ranolazine 500 MG 12 hr tablet  Commonly known as: RANEXA   500 mg, Oral, Every 12 Hours Scheduled      Rybelsus 7 MG tablet  Generic drug: Semaglutide   7 mg, Oral, Daily      Santyl 250 UNIT/GM ointment  Generic drug: collagenase   1 application, Topical, Daily      sevelamer 800 MG tablet  Commonly known as: RENVELA   800 mg, Oral, 3 Times Daily With Meals      sodium bicarbonate 650 MG tablet   1 tablet, Oral             Stop These Medications      promethazine 25 MG suppository  Commonly known as: PHENERGAN              INSTRUCTIONS:  Activity: As tolerated  Diet:  Diet Instructions       Diet: Consistent Carbohydrate, Cardiac, Renal      Discharge Diet:  Consistent Carbohydrate  Cardiac  Renal               FOLLOW UP:   Additional Instructions for the Follow-ups that You Need to Schedule       Discharge Follow-up with PCP   As directed       Currently Documented PCP:    Rianna Macias MD    PCP Phone Number:    [phone]     Follow Up Details: 1 week                Follow-up Information       Rianna Macias MD .    Specialty: Family Medicine  Why: 1 week  Contact information:  [address] [place], [place] [id number] [phone]                             PENDING TEST RESULTS AT DISCHARGE  Pending Labs       Order Current Status    Blood Culture - Blood, Arm, Left Preliminary result            Time: >30 minutes were spent in discharge planning, medication reconciliation and coordination of care for this patient.    Dr. Alex Wells is the attending at time of discharge, He is aware of the patient's status and agrees with the above discharge summary.    Signature  Rianna Macias. MD  [facility]  [address] [facility], [place] [address]  Office: [phone]        This document has been electronically signed by Rianna Macias MD on September 9, 2022 14:03  CDT

## 2022-09-09 NOTE — PROGRESS NOTES
"  [facility]  [address] [place] [address]. [address]  T - [phone]  F - [phone]     Progress Note          PATIENT  DEMOGRAPHICS   PATIENT NAME: Yamileth Slater                      PHYSICIAN: Ace Lauren MD  : 1960  MRN: [id number]   LOS: 2 days    Patient Care Team:  Rianna Macias MD as PCP - General (Family Medicine)  Subjective   SUBJECTIVE   More alert. No soa. Plan for transfer to [facility]        Objective   OBJECTIVE   Vital Signs  Temp:  [96.6 °F (35.9 °C)-99.2 °F (37.3 °C)] 96.8 °F (36 °C)  Heart Rate:  [71-83] 71  Resp:  [18-20] 20  BP: ()/(48-98) 99/58    Flowsheet Rows    Flowsheet Row First Filed Value   Admission Height 157.5 cm (62\") Documented at 2022   Admission Weight 115 kg (254 lb) Documented at 2022           I/O last 3 completed shifts:  In: 840 [P.O.:640; IV Piggyback:200]  Out: 3250 [Urine:950; Emesis/NG output:300; Other:2000]    PHYSICAL EXAM    Physical Exam  Constitutional:       Appearance: She is well-developed.   HENT:      Head: Normocephalic.   Eyes:      Pupils: Pupils are equal, round, and reactive to light.   Cardiovascular:      Rate and Rhythm: Normal rate and regular rhythm.      Heart sounds: Normal heart sounds.   Pulmonary:      Effort: Pulmonary effort is normal.      Breath sounds: Normal breath sounds.   Abdominal:      General: Bowel sounds are normal.      Palpations: Abdomen is soft.   Skin:     Coloration: Skin is not jaundiced.   Neurological:      General: No focal deficit present.      Mental Status: She is alert and oriented to person, place, and time.         RESULTS   Results Review:    Results from last 7 days   Lab Units 22  0626 22  0522 22  0650   SODIUM mmol/L 131* 136 130*   POTASSIUM mmol/L 3.6 3.4* 3.7   CHLORIDE mmol/L 92* 97* 92*   CO2 mmol/L 29.0 31.0* 28.0   BUN mg/dL 24* 18 42*   CREATININE mg/dL 2.71* 1.75* 3.19*   CALCIUM mg/dL 8.5* 8.7 8.8   BILIRUBIN " mg/dL 0.3 0.3 0.4   ALK PHOS U/L 158* 139* 172*   ALT (SGPT) U/L <5 <5 <5   AST (SGOT) U/L 6 8 7   GLUCOSE mg/dL 278* 70 452*       Estimated Creatinine Clearance: 25.8 mL/min (A) (by C-G formula based on SCr of 2.71 mg/dL (H)).                Results from last 7 days   Lab Units 09/09/22  0626 09/08/22  0522 09/07/22  0651 09/06/22  2058   WBC 10*3/mm3 11.01* 10.01 12.47* 14.21*   HEMOGLOBIN g/dL 9.8* 9.5* 9.7* 10.4*   PLATELETS 10*3/mm3 286 247 230 224       Results from last 7 days   Lab Units 09/07/22  0650   INR  1.25*         Imaging Results (Last 24 Hours)     ** No results found for the last 24 hours. **           MEDICATIONS    [START ON 9/10/2022] aspirin, 81 mg, Oral, Daily  atorvastatin, 20 mg, Oral, Daily  bumetanide, 2 mg, Oral, Once per day on Sun Tue Thu Sat  cetirizine, 5 mg, Oral, Daily  clopidogrel, 75 mg, Oral, Daily  collagenase, 1 application, Topical, Daily  epoetin hubert/hubert-epbx, 6,000 Units, Intravenous, Once per day on Mon Wed Fri  gabapentin, 300 mg, Oral, Daily  gabapentin, 300 mg, Oral, Once per day on Mon Wed Fri  hydrocortisone-bacitracin-zinc oxide-nystatin, 1 application, Topical, BID  Insulin Aspart, 0-24 Units, Subcutaneous, TID AC  Insulin Aspart, 30 Units, Subcutaneous, TID With Meals  insulin detemir, 36 Units, Subcutaneous, Nightly  ipratropium-albuterol, 3 mL, Nebulization, 4x Daily - RT  isosorbide mononitrate, 30 mg, Oral, QAM  levothyroxine, 25 mcg, Oral, Daily  lisinopril, 20 mg, Oral, Daily  metoprolol tartrate, 25 mg, Oral, Q12H  montelukast, 10 mg, Oral, Nightly  nystatin, , Topical, Q12H  oxybutynin XL, 5 mg, Oral, Daily  pantoprazole, 40 mg, Oral, Nightly  piperacillin-tazobactam, 4.5 g, Intravenous, Q12H  povidone-iodine, , Topical, Daily  ranolazine, 500 mg, Oral, Q12H  Semaglutide, 7 mg, Oral, Daily  senna-docusate sodium, 2 tablet, Oral, BID  sevelamer, 800 mg, Oral, TID With Meals  sodium chloride, 10 mL, Intravenous, Q12H      Pharmacy to dose vancomycin,    Pharmacy to Dose Zosyn,         Assessment & Plan   ASSESSMENT / PLAN      Gangrene of both feet (HCC)    Mixed hyperlipidemia    GERD (gastroesophageal reflux disease)    Hypertension    Coronary artery disease involving native coronary artery of native heart without angina pectoris    Hypothyroidism    MIKE and COPD overlap syndrome (HCC)    Cellulitis of foot associated with diabetes mellitus (HCC)    Type 2 diabetes mellitus with autonomic neuropathy (HCC)    Physical deconditioning    ESRD on hemodialysis (HCC)    Anemia       1. ESRD on HD MWF:  - Dialysis dependent since October 2021.  On dialysis at [facility].   - HD today. HD via tunnel cath. Awaiting AVF creation. Prior right UE AVF     2. Anemia of chronic kidney disease:  - Patient had extensive work-up in the past by Dr. Munoz. She also has B12 deficiency. She has history of AVM.    - Hemoglobin is acceptable . On epogen     3. Hypertension:  - Well controlled     4. Sepsis/Gangrene:  - Manage per primary team. Dr Oliveira is on board, plan to transfer to [facility][1] with Infectious disease coverage     5. Diabetes type 2:  - Management per primary team.      6. Coronary artery disease     7. Chronic kidney disease/mineral and bone disorder:  - On sevelamer                      This document has been electronically signed by Ace Lauren MD on September 9, 2022 11:17 CDT

## 2022-09-09 NOTE — CASE MANAGEMENT/SOCIAL WORK
"Physicians Statement of Medical Necessity for  Ambulance Transportation    GENERAL INFORMATION     Name: Yamileth Slater  YOB: 1960  Medicare #:   Transport Date: 9/9/2022(Valid for round trips this date, or for scheduled repetitive trips for 60 days from the date signed below.)  Origin: [facility] Room 403 Destination: [facility] Room 3640  Is the Patient's stay covered under Medicare Part A (PPS/DRG?)No   Closest appropriate facility? Yes  If this a hosp-hosp transfer? Yes, describe services needed at 2nd facility not available at 1st facility infectious disease  Is this a hospice patient? No    MEDICAL NECESSITY QUESTIONAIRE    Ambulance Transportation is medically necessary only if other means of transportation are contraindicated or would be potentially harmful to the patient.  To meet this requirement, the patient must be either \"bed confined\" or suffer from a condition such that transport by means other than an ambulance is contraindicated by the patient's condition.  The following questions must be answered by the healthcare professional signing below for this form to be valid:     1) Describe the MEDICAL CONDITION (physical and/or mental) of this patient AT THE TIME OF AMBULANCE TRANSPORT that requires the patient to be transported in an ambulance, and why transport by other means is contraindicated by the patient's condition:   Patient with a history of ESRD on HD, contact precautions for MRSA and CRE, requires O2 at 2L NC now has gangrene to toes on bilateral feet. Patient is being transferred to [facility] for Infectious Disease coverage that is not available at [facility].      2) Is this patient \"bed confined\" as defined below?Yes   To be \"bed confined\" the patient must satisfy all three of the following criteria:  (1) unable to get up from bed without assistance; AND (2) unable to ambulate;  AND (3) unable to sit in a chair or wheelchair.  3) Can this patient " safely be transported by car or wheelchair van (I.e., may safely sit during transport, without an attendant or monitoring?)No   4. In addition to completing questions 1-3 above, please check any of the following conditions that apply*:          *Note: supporting documentation for any boxes checked must be maintained in the patient's medical records Moderate/severe pain on movement, Medical attendant required, Requires oxygen - unable to self administer, Special handling/isolation/infection control precautions required and Unable to tolerate seated position for time needed to transport      SIGNATURE OF PHYSICIAN OR OTHER AUTHORIZED HEALTHCARE PROFESSIONAL    I certify that the above information is true and correct based on my evaluation of this patient, and represent that the patient requires transport by ambulance and that other forms of transport are contraindicated.  I understand that this information will be used by the Centers for Medicare and Medicaid Services (CMS) to support the determiniation of medical necessity for ambulance services, and I represent that I have personal knowledge of the patient's condition at the time of transport.       If this box is checked, I also certify that the patient is physically or mentally incapable of signing the ambulance service's claim form and that the institution with which I am affiliated has furnished care, services or assistance to the patient.  My signature below is made on behalf of the patient pursuant to 42 .36(b)(4). In accordance with 42 .37, the specific reason(s) that the patient is physically or mentally incapable of signing the claim for is as follows:    Signature of Physician or Healthcare Professional     Laz Brenner RN, CM Date/Time:     9/9/2022 at 1453     (For Scheduled repetitive transport, this form is not valid for transports performed more than 60 days after this date).      Laz Brenner RN, GAEL.                                                                                                                                        --------------------------------------------------------------------------------------------  Printed Name and Credentials of Physician or Authorized Healthcare Professional     *Form must be signed by patient's attending physician for scheduled, repetitive transports,.  For non-repetitive ambulance transports, if unable to obtain the signature of the attending physician, any of the following may sign (please select below):     Physician  Clinical Nurse Specialist X Registered Nurse     Physician Assistant X Discharge Planner  Licensed Practical Nurse     Nurse Practitioner X

## 2022-09-09 NOTE — PROGRESS NOTES
"Pharmacokinetics by Pharmacy - Vancomycin    Yamileth Slater is a 62 y.o. female receiving vancomycin pulse doses on hemodialysis for skin and soft tissue infection, gangrene of both feet.    Patient is also receiving zosyn.     Objective:  [Ht: 157.5 cm (62\"); Wt: 117 kg (257 lb 1.6 oz)]     WBC   Date Value Ref Range Status   09/09/2022 11.01 (H) 3.40 - 10.80 10*3/mm3 Final   09/08/2022 10.01 3.40 - 10.80 10*3/mm3 Final   09/07/2022 12.47 (H) 3.40 - 10.80 10*3/mm3 Final      C-Reactive Protein   Date Value Ref Range Status   09/09/2022 18.68 (H) 0.00 - 0.50 mg/dL Final   09/07/2022 34.10 (H) 0.00 - 0.50 mg/dL Final   09/06/2022 32.67 (H) 0.00 - 0.50 mg/dL Final     Lactate   Date Value Ref Range Status   09/06/2022 1.6 0.5 - 2.0 mmol/L Final      Temp Readings from Last 1 Encounters:   09/09/22 96.8 °F (36 °C) (Axillary)     Estimated Creatinine Clearance: 25.8 mL/min (A) (by C-G formula based on SCr of 2.71 mg/dL (H)).   Creatinine   Date Value Ref Range Status   09/09/2022 2.71 (H) 0.57 - 1.00 mg/dL Final   09/08/2022 1.75 (H) 0.57 - 1.00 mg/dL Final   09/07/2022 3.19 (H) 0.57 - 1.00 mg/dL Final       Vancomycin Random   Date Value Ref Range Status   09/09/2022 20.10 5.00 - 40.00 mcg/mL Final   09/07/2022 18.00 5.00 - 40.00 mcg/mL Final       Culture Results:  Microbiology Results (last 10 days)     Procedure Component Value - Date/Time    Blood Culture - Blood, Arm, Left [[id number]]  (Normal) Collected: 09/06/22 2058    Lab Status: Preliminary result Specimen: Blood from Arm, Left Updated: 09/08/22 2115     Blood Culture No growth at 2 days        No results found for: RESPCX      Assessment:  Random Level 9/9: 20.1 mcg/ml at 0626   Blood culture 9/6: No growth at 2 days      Afebrile  WBC WNL, down trending      Patient is currently on MWF dialysis.   Patient scheduled for dialysis today.   Patient likely to transfer to another facility.     Plan:  1. Give vancomycin 500mg IV once today after dialysis. " Dose scheduled for 2000.   2. Ordered vancomycin random level for 0600 every MWF before dialysis   3. Pharmacy will monitor renal function and adjust dose accordingly.      Loly Fang RPH   09/09/22 13:16 CDT

## 2022-09-11 LAB — BACTERIA SPEC AEROBE CULT: NORMAL

## 2022-09-12 NOTE — PAYOR COMM NOTE
"Komal [place]  [facility]  Case Management Extender  [phone] phone  [phone] fax      Ref# [id number]    Yamileth Slater (62 y.o. Female)             Date of Birth   1960    Social Security Number       Address   [address] [place] [address]    Home Phone   [phone]    MRN   [id number]       [employer]   None    Marital Status                               Admission Date   9/6/22    Admission Type   Emergency    Admitting Provider   Kit Brar MD    Attending Provider       Department, Room/Bed   [facility] 4 [place], 403/1       Discharge Date   9/10/2022    Discharge Disposition   Short Term Hospital (DC - External)    Discharge Destination                               Attending Provider: (none)   Allergies: Adhesive Tape, Nsaids, Latex, Other    Isolation: None   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), 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)   Code Status: Prior   Advance Care Planning Activity    Ht: 157.5 cm (62\")   Wt: 117 kg (257 lb 1.6 oz)    Admission Cmt: None   Principal Problem: Gangrene of both feet (HCC) [I96]                 Active Insurance as of 9/6/2022     Primary Coverage     Payor Plan Insurance Group Employer/Plan Group    ANTHEM MEDICARE REPLACEMENT ANTHEM MEDICARE ADVANTAGE [id number]     Payor Plan Address Payor Plan Phone Number Payor Plan Fax Number Effective Dates    [address] [phone]  1/1/2022 - None Entered    [facility] [address]       Subscriber Name Subscriber Birth Date Member ID       YAMILETH SLATER ARMAAN 1960 [id number]           Secondary Coverage     Payor Plan Insurance Group Employer/Plan Group    [place]      Payor Plan Address Payor Plan Phone Number Payor Plan Fax Number Effective Dates    [address] [phone]  6/28/2019 - None Entered    [facility] [id number]       Subscriber Name Subscriber Birth Date Member ID       YAMILETH SLATER ARMAAN " 1960 [id number]                 Emergency Contacts      (Rel.) Home Phone Work Phone Mobile Phone    Huy Slater (Spouse) [phone] -- [phone]    Yamileth Chavez (Daughter) [phone] -- [phone]    Suzanne Rivera (Daughter) [phone] -- [phone]               Discharge Summary      Rianna Macias MD at 22 1341     Attestation signed by Alex Wells MD at 09/10/22 1156    I have seen and evaluated the patient.  I have discussed the case with the resident. I have reviewed the notes, assessment and plan, and/or procedures performed by the resident. I concur with the resident’s documentation.     Physical Exam:  General: NAD.  CV: S1 and S2 normal. RRR.  Pulmonary: Clear to auscultation bilaterally, no wheezing, no rales.   Abdomen: Bowel sounds present and normal. Abdomen is soft, obese, and nontender   Extremities: necrotic changes over the LE toes (right 1st and 2nd toe and left 2nd toe)     Plan:   62 y.o. female with a PMH of ESRD on dialysis MWF, diabetes mellitus, morbid obesity, CAD s/p stents, hyperlipidemia, MIKE/COPD, hypothyroidism   who presents with worsening toe wounds and is being managed for gangrene of both feet. Podiatry on the case. D/w Pt and she prefers to be transferred to a hospital with ID services and Pt has been transferred.                     DISCHARGE SUMMARY    PATIENT NAME: Yamileth Sylvester Bryon   PHYSICIAN: Rianna Macias MD  : 1960  MRN: [id number]    ADMITTED: 2022     DISCHARGED: 22      ADMITTING DIAGNOSIS:  Hyperglycemia [R73.9]  Diabetic foot ulcer associated with type 2 diabetes mellitus, unspecified laterality, unspecified part of foot, unspecified ulcer stage (HCC) [E11.621, L97.509]  Sepsis without acute organ dysfunction, due to unspecified organism (HCC) [A41.9]      DISCHARGE, AND RESOLVED DIAGNOSES:  Active Hospital Problems    Diagnosis  POA   • **Gangrene of both feet (HCC) [I96]  Unknown   • Anemia  "[D64.9]  Yes   • ESRD on hemodialysis (HCC) [N18.6, Z99.2]  Not Applicable   • Physical deconditioning [R53.81]  Yes   • Type 2 diabetes mellitus with autonomic neuropathy (HCC) [E11.43]  Yes   • Cellulitis of foot associated with diabetes mellitus (HCC) [E11.628, L03.119]  Yes   • MIKE and COPD overlap syndrome (HCC) [G47.33, J44.9]  Yes   • Hypothyroidism [E03.9]  Yes   • GERD (gastroesophageal reflux disease) [K21.9]  Yes   • Hypertension [I10]  Yes   • Mixed hyperlipidemia [E78.2]  Yes   • Coronary artery disease involving native coronary artery of native heart without angina pectoris [I25.10]  Yes      Resolved Hospital Problems   No resolved problems to display.         SERVICE: Family Medicine Residency  Attending: Dr. Alex Wells  Resident: Rianna Macias MD    CONSULTS:   Consult Orders (all) (From admission, onward)     Start     Ordered    09/07/22 0813  Inpatient Podiatry Consult  Once        Specialty:  Podiatry  Provider:  (Not yet assigned)    09/07/22 0814    09/07/22 0805  Inpatient Nephrology Consult  Once        Specialty:  Nephrology  Provider:  Ace Lauren MD    09/07/22 0805    09/07/22 0248  Inpatient Nutrition Consult  Once        Provider:  (Not yet assigned)    09/07/22 0249    09/07/22 0143  Inpatient Case Management  Consult  Once        Provider:  (Not yet assigned)    09/07/22 0143                PROCEDURES:   None    HISTORY OF PRESENT ILLNESS:   Per the H&P written by Dr. Coffman, dated 9/9/22:  \"Yamileth Slater is a 62 y.o.  female with a PMH of ESRD on dialysis MWF, diabetes mellitus, morbid obesity, CAD s/p stents, hyperlipidemia, MIKE/COPD, hypothyroidism   who presents with worsening toe wounds.  Patient initially noticed wound on left second toe and right great toe approximately 3 weeks ago.  Home health has been intermittently coming and helping with topical medications.  Patient has not been on any antibiotics for these infections.  Over the same " "time patient's blood sugars been difficult to control.  Most recently over the last couple of weeks its been ranging between 400 and 500.  Wounds are on the ventral aspect of the toes mentioned above.  The left side will intermittently drain/bleed some.  Recently they noticed that the color has gone from red to purple/black.  Patient does not have sensation her feet.  Recently as well they have noted erythema on the left leg and some streaking on the right.     In the emergency department, exam revealed purple to black coloration on toes listed above.  CRP was elevated.  Patient had a white blood cell count.  Initially she was tachycardic and triggered sepsis protocol.  Patient was due to get dialysis yesterday but did not receive it secondary to the holiday.  Patient's blood sugar was 549 on CMP.  Creatinine was 3.18 with a EGFR of 15.8 which is slightly above baseline for her.  X-ray of both feet did not reveal any osteomyelitis.  Patient was admitted for sepsis and gangrene of 2 toes.\"    DIAGNOSTIC DATA:   Lab Results (last 72 hours)     Procedure Component Value Units Date/Time    Comprehensive Metabolic Panel [[id number]]  (Abnormal) Collected: 09/09/22 0626    Specimen: Blood Updated: 09/09/22 0746     Glucose 278 mg/dL      BUN 24 mg/dL      Creatinine 2.71 mg/dL      Sodium 131 mmol/L      Potassium 3.6 mmol/L      Chloride 92 mmol/L      CO2 29.0 mmol/L      Calcium 8.5 mg/dL      Total Protein 7.1 g/dL      Albumin 2.60 g/dL      ALT (SGPT) <5 U/L      AST (SGOT) 6 U/L      Alkaline Phosphatase 158 U/L      Total Bilirubin 0.3 mg/dL      Globulin 4.5 gm/dL      A/G Ratio 0.6 g/dL      BUN/Creatinine Ratio 8.9     Anion Gap 10.0 mmol/L      eGFR 19.2 mL/min/1.73      Comment: National Kidney Foundation and American Society of Nephrology (ASN) Task Force recommended calculation based on the Chronic Kidney Disease Epidemiology Collaboration (CKD-EPI) equation refit without adjustment for race.       " Narrative:      GFR Normal >60  Chronic Kidney Disease <60  Kidney Failure <15      POC Glucose Once [[id number]]  (Abnormal) Collected: 09/09/22 0727    Specimen: Blood Updated: 09/09/22 0745     Glucose 295 mg/dL      Comment: RN NotifiedOperator: [id number] WatsonJOIE HEATHERMeter ID: [id number]       Vancomycin, Random [[id number]]  (Normal) Collected: 09/09/22 0626    Specimen: Blood Updated: 09/09/22 0745     Vancomycin Random 20.10 mcg/mL     C-reactive Protein [[id number]]  (Abnormal) Collected: 09/09/22 0626    Specimen: Blood Updated: 09/09/22 0745     C-Reactive Protein 18.68 mg/dL     Sedimentation Rate [[id number]]  (Abnormal) Collected: 09/09/22 0626    Specimen: Blood Updated: 09/09/22 0731     Sed Rate 100 mm/hr     CBC Auto Differential [[id number]]  (Abnormal) Collected: 09/09/22 0626    Specimen: Blood Updated: 09/09/22 0723     WBC 11.01 10*3/mm3      RBC 3.66 10*6/mm3      Hemoglobin 9.8 g/dL      Hematocrit 31.7 %      MCV 86.6 fL      MCH 26.8 pg      MCHC 30.9 g/dL      RDW 16.0 %      RDW-SD 51.1 fl      MPV 8.8 fL      Platelets 286 10*3/mm3      Neutrophil % 78.1 %      Lymphocyte % 11.4 %      Monocyte % 7.4 %      Eosinophil % 1.7 %      Basophil % 0.7 %      Immature Grans % 0.7 %      Neutrophils, Absolute 8.58 10*3/mm3      Lymphocytes, Absolute 1.26 10*3/mm3      Monocytes, Absolute 0.82 10*3/mm3      Eosinophils, Absolute 0.19 10*3/mm3      Basophils, Absolute 0.08 10*3/mm3      Immature Grans, Absolute 0.08 10*3/mm3      nRBC 0.0 /100 WBC     Blood Culture - Blood, Arm, Left [[id number]]  (Normal) Collected: 09/06/22 2058    Specimen: Blood from Arm, Left Updated: 09/08/22 2115     Blood Culture No growth at 2 days    POC Glucose Once [[id number]]  (Abnormal) Collected: 09/08/22 1901    Specimen: Blood Updated: 09/08/22 1925     Glucose 290 mg/dL      Comment: RN NotifiedOperator: [id number] RICHARDSON Marcus ID: [id number]       POC Glucose Once [[id number]]  (Abnormal) Collected:  09/08/22 1635    Specimen: Blood Updated: 09/08/22 1652     Glucose 192 mg/dL      Comment: RN NotifiedOperator: [id number] RICHA Chater ID: [id number]       POC Glucose Once [[id number]]  (Normal) Collected: 09/08/22 1110    Specimen: Blood Updated: 09/08/22 1133     Glucose 127 mg/dL      Comment: : [id number] EKTA CARLSONAMeter ID: [id number]       POC Glucose Once [[id number]]  (Normal) Collected: 09/08/22 0745    Specimen: Blood Updated: 09/08/22 0811     Glucose 91 mg/dL      Comment: : [id number] EKTA ANDREAMeter ID: [id number]       POC Glucose Once [[id number]]  (Abnormal) Collected: 09/07/22 1802    Specimen: Blood Updated: 09/08/22 0803     Glucose 58 mg/dL      Comment: : [id number] NIRANJAN BETHMeter ID: [id number]       POC Glucose Once [[id number]]  (Abnormal) Collected: 09/07/22 1800    Specimen: Blood Updated: 09/08/22 0803     Glucose 56 mg/dL      Comment: : [id number] NIRANJAN BETHMeter ID: [id number]       POC Glucose Once [[id number]]  (Abnormal) Collected: 09/07/22 1659    Specimen: Blood Updated: 09/08/22 0803     Glucose 54 mg/dL      Comment: RN NotifiedOperator: [id number] BRANNON LIUAMeter ID: [id number]       POC Glucose Once [[id number]]  (Normal) Collected: 09/08/22 0624    Specimen: Blood Updated: 09/08/22 0703     Glucose 93 mg/dL      Comment: : [id number] KHRIS Edis ID: [id number]       Comprehensive Metabolic Panel [[id number]]  (Abnormal) Collected: 09/08/22 0522    Specimen: Blood Updated: 09/08/22 0655     Glucose 70 mg/dL      BUN 18 mg/dL      Creatinine 1.75 mg/dL      Sodium 136 mmol/L      Potassium 3.4 mmol/L      Chloride 97 mmol/L      CO2 31.0 mmol/L      Calcium 8.7 mg/dL      Total Protein 7.5 g/dL      Albumin 2.90 g/dL      ALT (SGPT) <5 U/L      AST (SGOT) 8 U/L      Alkaline Phosphatase 139 U/L      Total Bilirubin 0.3 mg/dL      Globulin 4.6 gm/dL      A/G Ratio 0.6 g/dL      BUN/Creatinine Ratio  10.3     Anion Gap 8.0 mmol/L      eGFR 32.4 mL/min/1.73      Comment: National Kidney Foundation and American Society of Nephrology (ASN) Task Force recommended calculation based on the Chronic Kidney Disease Epidemiology Collaboration (CKD-EPI) equation refit without adjustment for race.       Narrative:      GFR Normal >60  Chronic Kidney Disease <60  Kidney Failure <15      CBC Auto Differential [[id number]]  (Abnormal) Collected: 09/08/22 0522    Specimen: Blood Updated: 09/08/22 0612     WBC 10.01 10*3/mm3      RBC 3.53 10*6/mm3      Hemoglobin 9.5 g/dL      Hematocrit 30.0 %      MCV 85.0 fL      MCH 26.9 pg      MCHC 31.7 g/dL      RDW 15.9 %      RDW-SD 50.1 fl      MPV 8.8 fL      Platelets 247 10*3/mm3      Neutrophil % 73.5 %      Lymphocyte % 15.7 %      Monocyte % 7.7 %      Eosinophil % 2.0 %      Basophil % 0.6 %      Immature Grans % 0.5 %      Neutrophils, Absolute 7.36 10*3/mm3      Lymphocytes, Absolute 1.57 10*3/mm3      Monocytes, Absolute 0.77 10*3/mm3      Eosinophils, Absolute 0.20 10*3/mm3      Basophils, Absolute 0.06 10*3/mm3      Immature Grans, Absolute 0.05 10*3/mm3      nRBC 0.0 /100 WBC     POC Glucose Once [[id number]]  (Normal) Collected: 09/08/22 0114    Specimen: Blood Updated: 09/08/22 0128     Glucose 118 mg/dL      Comment: RN NotifiedOperator: [id number] KHRIS UMANZORUAMeter ID: [id number]       POC Glucose Once [[id number]]  (Normal) Collected: 09/07/22 2246    Specimen: Blood Updated: 09/07/22 2300     Glucose 93 mg/dL      Comment: : [id number] KHRIS UMANZORUAMeter ID: [id number]       POC Glucose Once [[id number]]  (Normal) Collected: 09/07/22 2027    Specimen: Blood Updated: 09/07/22 2239     Glucose 118 mg/dL      Comment: RN NotifiedOperator: [id number] KHRIS UMANZORUAMeter ID: [id number]       POC Glucose Once [[id number]]  (Normal) Collected: 09/07/22 1941    Specimen: Blood Updated: 09/07/22 2028     Glucose 72 mg/dL      Comment: : [id number] MONTES  Vereniceer ID: [id number]       POC Glucose Once [[id number]]  (Normal) Collected: 09/07/22 1902    Specimen: Blood Updated: 09/07/22 1915     Glucose 82 mg/dL      Comment: : [id number] NIRANAJN ARBOLEDAeter ID: [id number]       POC Glucose Once [[id number]]  (Abnormal) Collected: 09/07/22 1826    Specimen: Blood Updated: 09/07/22 1914     Glucose 65 mg/dL      Comment: : [id number] NIRANJAN LEEHMeter ID: [id number]       POC Glucose Once [[id number]]  (Abnormal) Collected: 09/07/22 1741    Specimen: Blood Updated: 09/07/22 1819     Glucose 56 mg/dL      Comment: Result Not ConfirmedOperator: [id number] NIRANJAN ARBOLEDAeter ID: [id number]       POC Glucose Once [[id number]]  (Abnormal) Collected: 09/07/22 1725    Specimen: Blood Updated: 09/07/22 1737     Glucose 53 mg/dL      Comment: RN NotifiedOperator: [id number] BRANNON LAKHANIANNAMeter ID: [id number]       Vancomycin, Random [[id number]]  (Normal) Collected: 09/07/22 1325    Specimen: Blood Updated: 09/07/22 1355     Vancomycin Random 18.00 mcg/mL     Hepatitis B Surface Antigen [[id number]]  (Normal) Collected: 09/06/22 2337    Specimen: Blood Updated: 09/07/22 1305     Hepatitis B Surface Ag Non-Reactive    POC Glucose Once [[id number]]  (Abnormal) Collected: 09/07/22 1028    Specimen: Blood Updated: 09/07/22 1113     Glucose 317 mg/dL      Comment: RN NotifiedOperator: [id number] BRANNON TOMLINSON'ANNAMeter ID: [id number]       POC Glucose Once [[id number]]  (Abnormal) Collected: 09/07/22 0846    Specimen: Blood Updated: 09/07/22 1001     Glucose 470 mg/dL      Comment: RN NotifiedOperator: [id number] SOUTH DAVI'ANNAMeter ID: [id number]       POC Glucose Once [[id number]]  (Abnormal) Collected: 09/07/22 0709    Specimen: Blood Updated: 09/07/22 1001     Glucose 457 mg/dL      Comment: RN NotifiedOperator: [id number] BARNNON LAKHANIANNAMeter ID: [id number]       POC Glucose Once [[id number]]  (Abnormal) Collected: 09/06/22 2391    Specimen: Blood  Updated: 09/07/22 0950     Glucose 485 mg/dL      Comment: RN NotifiedOperator: [id number] KIMBERLYN Traore ID: [id number]       Hemoglobin A1c [[id number]]  (Abnormal) Collected: 09/07/22 0650    Specimen: Blood Updated: 09/07/22 0912     Hemoglobin A1C 11.00 %     Narrative:      Hemoglobin A1C Ranges:    Increased Risk for Diabetes  5.7% to 6.4%  Diabetes                     >= 6.5%  Diabetic Goal                < 7.0%    Comprehensive Metabolic Panel [[id number]]  (Abnormal) Collected: 09/07/22 0650    Specimen: Blood Updated: 09/07/22 0824     Glucose 452 mg/dL      BUN 42 mg/dL      Creatinine 3.19 mg/dL      Sodium 130 mmol/L      Potassium 3.7 mmol/L      Chloride 92 mmol/L      CO2 28.0 mmol/L      Calcium 8.8 mg/dL      Total Protein 7.6 g/dL      Albumin 2.60 g/dL      ALT (SGPT) <5 U/L      AST (SGOT) 7 U/L      Alkaline Phosphatase 172 U/L      Total Bilirubin 0.4 mg/dL      Globulin 5.0 gm/dL      A/G Ratio 0.5 g/dL      BUN/Creatinine Ratio 13.2     Anion Gap 10.0 mmol/L      eGFR 15.8 mL/min/1.73      Comment: National Kidney Foundation and American Society of Nephrology (ASN) Task Force recommended calculation based on the Chronic Kidney Disease Epidemiology Collaboration (CKD-EPI) equation refit without adjustment for race.       Narrative:      GFR Normal >60  Chronic Kidney Disease <60  Kidney Failure <15      Lipid Panel [[id number]]  (Abnormal) Collected: 09/07/22 0650    Specimen: Blood Updated: 09/07/22 0814     Total Cholesterol 124 mg/dL      Triglycerides 173 mg/dL      HDL Cholesterol 39 mg/dL      LDL Cholesterol  56 mg/dL      VLDL Cholesterol 29 mg/dL      LDL/HDL Ratio 1.29    Narrative:      Cholesterol Reference Ranges  (U.S. Department of Health and Human Services ATP III Classifications)    Desirable          <200 mg/dL  Borderline High    200-239 mg/dL  High Risk          >240 mg/dL      Triglyceride Reference Ranges  (U.S. Department of Health and Human Services ATP III  Classifications)    Normal           <150 mg/dL  Borderline High  150-199 mg/dL  High             200-499 mg/dL  Very High        >500 mg/dL    HDL Reference Ranges  (U.S. Department of Health and Human Services ATP III Classifications)    Low     <40 mg/dl (major risk factor for CHD)  High    >60 mg/dl ('negative' risk factor for CHD)        LDL Reference Ranges  (U.S. Department of Health and Human Services ATP III Classifications)    Optimal          <100 mg/dL  Near Optimal     100-129 mg/dL  Borderline High  130-159 mg/dL  High             160-189 mg/dL  Very High        >189 mg/dL    C-reactive Protein [[id number]]  (Abnormal) Collected: 09/07/22 0650    Specimen: Blood Updated: 09/07/22 0814     C-Reactive Protein 34.10 mg/dL     Protime-INR [[id number]]  (Abnormal) Collected: 09/07/22 0650    Specimen: Blood Updated: 09/07/22 0716     Protime 15.6 Seconds      INR 1.25    Narrative:      Therapeutic range for most indications is 2.0-3.0 INR,  or 2.5-3.5 for mechanical heart valves.    CBC Auto Differential [[id number]]  (Abnormal) Collected: 09/07/22 0651    Specimen: Blood Updated: 09/07/22 0701     WBC 12.47 10*3/mm3      RBC 3.59 10*6/mm3      Hemoglobin 9.7 g/dL      Hematocrit 31.6 %      MCV 88.0 fL      MCH 27.0 pg      MCHC 30.7 g/dL      RDW 16.2 %      RDW-SD 51.9 fl      MPV 8.9 fL      Platelets 230 10*3/mm3      Neutrophil % 78.3 %      Lymphocyte % 12.4 %      Monocyte % 7.1 %      Eosinophil % 1.0 %      Basophil % 0.6 %      Immature Grans % 0.6 %      Neutrophils, Absolute 9.74 10*3/mm3      Lymphocytes, Absolute 1.55 10*3/mm3      Monocytes, Absolute 0.89 10*3/mm3      Eosinophils, Absolute 0.13 10*3/mm3      Basophils, Absolute 0.08 10*3/mm3      Immature Grans, Absolute 0.08 10*3/mm3      nRBC 0.0 /100 WBC     [place] Draw [[id number]] Collected: 09/06/22 2058    Specimen: Blood Updated: 09/07/22 0048    Narrative:      The following orders were created for panel order [place]  Draw.  Procedure                               Abnormality         Status                     ---------                               -----------         ------                     Green Top (Gel)[[id number]]                                  Final result               Lavender Top[[id number]]                                     Final result               Gold Top - SST[[id number]]                                   Final result               Light Blue Top[[id number]]                                   Final result                 Please view results for these tests on the individual orders.    Gold Top - SST [[id number]] Collected: 09/06/22 2337    Specimen: Blood Updated: 09/07/22 0048     Extra Tube Hold for add-ons.     Comment: Auto resulted.       Green Top (Gel) [[id number]] Collected: 09/06/22 2058    Specimen: Blood Updated: 09/06/22 2202     Extra Tube Hold for add-ons.     Comment: Auto resulted.       Lavender Top [[id number]] Collected: 09/06/22 2058    Specimen: Blood Updated: 09/06/22 2202     Extra Tube hold for add-on     Comment: Auto resulted       Light Blue Top [[id number]] Collected: 09/06/22 2058    Specimen: Blood Updated: 09/06/22 2202     Extra Tube Hold for add-ons.     Comment: Auto resulted       Comprehensive Metabolic Panel [[id number]]  (Abnormal) Collected: 09/06/22 2058    Specimen: Blood Updated: 09/06/22 2127     Glucose 549 mg/dL      BUN 43 mg/dL      Creatinine 3.18 mg/dL      Sodium 129 mmol/L      Potassium 3.7 mmol/L      Chloride 89 mmol/L      CO2 28.0 mmol/L      Calcium 9.1 mg/dL      Total Protein 8.1 g/dL      Albumin 3.10 g/dL      ALT (SGPT) 5 U/L      AST (SGOT) 9 U/L      Alkaline Phosphatase 194 U/L      Total Bilirubin 0.4 mg/dL      Globulin 5.0 gm/dL      A/G Ratio 0.6 g/dL      BUN/Creatinine Ratio 13.5     Anion Gap 12.0 mmol/L      eGFR 15.8 mL/min/1.73      Comment: National Kidney Foundation and American Society of Nephrology (ASN) Task Force recommended calculation  based on the Chronic Kidney Disease Epidemiology Collaboration (CKD-EPI) equation refit without adjustment for race.       Narrative:      GFR Normal >60  Chronic Kidney Disease <60  Kidney Failure <15      C-reactive Protein [[id number]]  (Abnormal) Collected: 09/06/22 2058    Specimen: Blood Updated: 09/06/22 2124     C-Reactive Protein 32.67 mg/dL     Lactic Acid, Plasma [[id number]]  (Normal) Collected: 09/06/22 2058    Specimen: Blood Updated: 09/06/22 2120     Lactate 1.6 mmol/L     Sedimentation Rate [[id number]]  (Abnormal) Collected: 09/06/22 2058    Specimen: Blood Updated: 09/06/22 2110     Sed Rate 114 mm/hr     CBC & Differential [[id number]]  (Abnormal) Collected: 09/06/22 2058    Specimen: Blood Updated: 09/06/22 2106    Narrative:      The following orders were created for panel order CBC & Differential.  Procedure                               Abnormality         Status                     ---------                               -----------         ------                     CBC Auto Differential[[id number]]        Abnormal            Final result                 Please view results for these tests on the individual orders.    CBC Auto Differential [[id number]]  (Abnormal) Collected: 09/06/22 2058    Specimen: Blood Updated: 09/06/22 2106     WBC 14.21 10*3/mm3      RBC 3.91 10*6/mm3      Hemoglobin 10.4 g/dL      Hematocrit 34.0 %      MCV 87.0 fL      MCH 26.6 pg      MCHC 30.6 g/dL      RDW 16.4 %      RDW-SD 51.9 fl      MPV 8.8 fL      Platelets 224 10*3/mm3      Neutrophil % 84.5 %      Lymphocyte % 7.2 %      Monocyte % 6.3 %      Eosinophil % 0.4 %      Basophil % 0.6 %      Immature Grans % 1.0 %      Neutrophils, Absolute 12.01 10*3/mm3      Lymphocytes, Absolute 1.02 10*3/mm3      Monocytes, Absolute 0.90 10*3/mm3      Eosinophils, Absolute 0.05 10*3/mm3      Basophils, Absolute 0.09 10*3/mm3      Immature Grans, Absolute 0.14 10*3/mm3      nRBC 0.0 /100 WBC            MRI Foot Right Without  Contrast    Result Date: 9/7/2022  Impression: 1. Severe motion degradation. The technologist notes the patient was repeatedly asked to remain still. Multiple sequences are repeated. Motion decreases sensitivity and specificity of the exam. 2. There appears to be mild bone marrow edema involving the phalanges of the first and second digits without clear accompanying T1 signal changes. Findings are indeterminate for osteomyelitis. Subcutaneous edema of the first and second toes likely cellulitis. Limited sensitivity for abscess. Repeat MRI examination with the addition of gadolinium contrast when the patient is able to hold still, or three-phase bone scan may be considered if increased sensitivity and specificity is desired. 3. Muscle fatty atrophy suggesting presence of a polyneuropathy. Electronically signed by:  Froylan Bennett MD  9/7/2022 1:09 PM CDT Workstation: [id number]    US Arterial Doppler Lower Extremity Complete    Result Date: 9/7/2022  Moderate diffuse atherosclerotic irregularity with no evidence of any significant femoral or popliteal artery stenosis or occlusion. There appears to be three-vessel runoff bilaterally Electronically signed by:  Pablo Rubio MD  9/7/2022 11:52 PM CDT Workstation: [id number]    XR Foot 3+ View Bilateral    Result Date: 9/6/2022  No definite plain radiographic evidence of osteomyelitis in either foot. If there is high clinical concern consider MRI. Electronically signed by:  Eusebio Sargent  9/6/2022 9:05 PM CDT Workstation: [id number]       HOSPITAL COURSE:  #Gangrene of both feet:  Patient is a known vasculopath and poorly controlled diabetic. Presented with leukocytosis and elevated CRP, in the setting of ulcers on the right 1st and second toe and left 2nd toe.  Patient with a complex infectious disease history with known MRSA infection and previous CRE infection as well. Patient was started on vancomycin and zosyn for MRSA and pseudomonas coverage. Podiatry was consulted and  stated that she is a poor surgical candidate and recommended betadine and continue current antibiotics. CRP and sed rate has been trending down since admission. Patient requested to be transfer to a facility with ID for further management.     #ESRD on dialysis:  Patient is on dialysis on M/W/F. She got dialysis on 9/7/22 and will get dyialysis today before being discharged. Patient follows up nephrology.   Due to the current presentation, risks of not appropriately treating gangrene/cellulitis outweighs potential nephrotoxic nature of vancomycin and zosyn.     #MIKE/COPD:   Patient uses trilogy machine at nights, and she uses 2-3 L NC at home. She received duobeds Q4H/PRN while admitted.      #Hypothyroidism:   Patient was continued on levothyroxine 25 MCG.     #Diabetes:  Patient is a known poor compliant. She reports home regimen of 40 units long-acting insulin twice a day and 40 units short acting insulin prandially. She also takes rybelsus 7 mg daily, which was continued while hospitalized.      #Anemia:   Patient has chronic anemia. Her Hb is usually around 9.5. It ws stable while hospitalized. She receives epogen 5000 units on the days she get dialysis.       DISCHARGE CONDITION:   Stable    DISPOSITION:  [facility]    DISCHARGE MEDICATIONS     Discharge Medications      New Medications      Instructions Start Date   dextrose 50 % solution  Commonly known as: D50W   25 g, Intravenous, Every 15 Minutes PRN      morphine 2 MG/ML injection   1 mg, Intravenous, Every 4 Hours PRN      naloxone 0.4 MG/ML injection  Commonly known as: NARCAN   0.4 mg, Intravenous, Every 5 Minutes PRN      piperacillin-tazobactam  Commonly known as: ZOSYN   4.5 g, Intravenous, Every 12 Hours   Start Date: September 10, 2022     vancomycin   500 mg, Intravenous, Once         Continue These Medications      Instructions Start Date   acetaminophen 650 MG 8 hr tablet  Commonly known as:  "Tylenol 8 Hour   650 mg, Oral, Every 8 Hours PRN      Adult Aspirin Regimen 81 MG EC tablet  Generic drug: aspirin   81 mg, Oral, Daily      albuterol sulfate  (90 Base) MCG/ACT inhaler  Commonly known as: PROVENTIL HFA;VENTOLIN HFA;PROAIR HFA   2 puffs, Inhalation, Every 4 Hours PRN      albuterol (2.5 MG/3ML) 0.083% nebulizer solution  Commonly known as: PROVENTIL   Inhale the contents of 1 vial by nebulization Every 4 (Four) Hours As Needed for Wheezing.      atorvastatin 20 MG tablet  Commonly known as: LIPITOR   20 mg, Oral, Every Night at Bedtime      bumetanide 2 MG tablet  Commonly known as: BUMEX   Take 1 tablet by mouth 4 (Four) Times a Week. Take on non-hemodialysis days.      Capsaicin 0.035 % cream   3 g, Apply externally, 3 Times Daily PRN      Cetirizine HCl 10 MG capsule   1 capsule, Oral, Daily      clopidogrel 75 MG tablet  Commonly known as: PLAVIX   75 mg, Oral, Daily      [employer] Blood Glucose Meter w/Device kit   1 each, Does not apply, 3 Times Daily      Diclofenac Sodium 1 % gel gel  Commonly known as: VOLTAREN   4 g, Topical, 4 Times Daily PRN      DULoxetine 20 MG capsule  Commonly known as: CYMBALTA   40 mg, Oral, Daily      Easy Touch Insulin Syringe 30G X 5/16\" 0.5 ML misc  Generic drug: Insulin Syringe-Needle U-100   USE AS DIRECTED WITH LEVEMIR      Easy Touch Pen Needles 31G X 8 MM misc  Generic drug: Insulin Pen Needle   No dose, route, or frequency recorded.      NovoFine 30G X 8 MM misc  Generic drug: Insulin Pen Needle   As directed 4 times daily      B-D ULTRAFINE III SHORT PEN 31G X 8 MM misc  Generic drug: Insulin Pen Needle   Use to inject insulin 4 (Four) Times a Day as directed.      gabapentin 300 MG capsule  Commonly known as: NEURONTIN   TAKE 1 CAPSULE BY MOUTH DAILY. AND AN EXTRA PILL MONDAY/WEDNESDAY/FRIDAY - DIALYSIS DAYS      glucose blood test strip   Use as instructed      insulin detemir 100 UNIT/ML injection  Commonly known as: LEVEMIR   25 Units, Subcutaneous, " Nightly      Insulin Lispro (1 Unit Dial) 100 UNIT/ML solution pen-injector  Commonly known as: HUMALOG   12 Units, Subcutaneous, 3 Times Daily With Meals      ipratropium-albuterol 0.5-2.5 mg/3 ml nebulizer  Commonly known as: DUO-NEB   3 mL, Nebulization, 4 Times Daily - RT      isosorbide mononitrate 30 MG 24 hr tablet  Commonly known as: IMDUR   30 mg, Oral, Every Morning      levothyroxine 25 MCG tablet  Commonly known as: SYNTHROID, LEVOTHROID   25 mcg, Oral, Daily      lisinopril 20 MG tablet  Commonly known as: PRINIVIL,ZESTRIL   20 mg, Oral, Daily      metoprolol tartrate 25 MG tablet  Commonly known as: LOPRESSOR   25 mg, Oral, Every 12 Hours Scheduled      MIRCERA IJ   150 mcg, Every 14 Days      MIRCERA IJ   225 mcg, Every 14 Days      montelukast 10 MG tablet  Commonly known as: SINGULAIR   10 mg, Oral, Nightly      muscle rub 10-15 % cream cream   Apply 1 application topically to the appropriate area as directed 4 (Four) Times a Day As Needed for buttock pain.      nitroglycerin 0.4 MG SL tablet  Commonly known as: NITROSTAT   0.4 mg, Sublingual, Every 5 Minutes PRN      O2  Commonly known as: OXYGEN   3 L/min, Inhalation, Continuous      ondansetron ODT 4 MG disintegrating tablet  Commonly known as: Zofran ODT   4 mg, Translingual, Every 8 Hours PRN      oxybutynin XL 5 MG 24 hr tablet  Commonly known as: DITROPAN-XL   5 mg, Oral, Daily      pantoprazole 40 MG EC tablet  Commonly known as: PROTONIX   40 mg, Oral, Nightly      ranolazine 500 MG 12 hr tablet  Commonly known as: RANEXA   500 mg, Oral, Every 12 Hours Scheduled      Rybelsus 7 MG tablet  Generic drug: Semaglutide   7 mg, Oral, Daily      Santyl 250 UNIT/GM ointment  Generic drug: collagenase   1 application, Topical, Daily      sevelamer 800 MG tablet  Commonly known as: RENVELA   800 mg, Oral, 3 Times Daily With Meals      sodium bicarbonate 650 MG tablet   1 tablet, Oral         Stop These Medications    promethazine 25 MG  suppository  Commonly known as: PHENERGAN            INSTRUCTIONS:  Activity:   Diet:   Diet Instructions     Diet: Consistent Carbohydrate, Cardiac, Renal      Discharge Diet:  Consistent Carbohydrate  Cardiac  Renal             FOLLOW UP:   Additional Instructions for the Follow-ups that You Need to Schedule     Discharge Follow-up with PCP   As directed       Currently Documented PCP:    Rianna Macias MD    PCP Phone Number:    [phone]     Follow Up Details: 1 week            Follow-up Information     Rianna Macias MD .    Specialty: Family Medicine  Why: 1 week  Contact information:  [facility] [place] [facility] [place] [id number] [phone]                         PENDING TEST RESULTS AT DISCHARGE  Pending Labs     Order Current Status    Blood Culture - Blood, Arm, Left Preliminary result          Time: >30 minutes were spent in discharge planning, medication reconciliation and coordination of care for this patient.    Dr. Alex Wells is the attending at time of discharge, He is aware of the patient's status and agrees with the above discharge summary.    Signature  Rianna Macias. MD  [facility]  [address], [place] [id number]  Office: [phone]        This document has been electronically signed by Rianna Macias MD on September 9, 2022 14:03 CDT           Electronically signed by Alex Wells MD at 09/10/22 2516

## 2022-09-12 NOTE — CASE COMMUNICATION
60 Day Summary    Home Health need continues for wound care to great right toe trauma wound, left second toe trauma wound, left heel trauma wound and stage 2 PU on coccyx. Patient has a chronic stage 2 PU LLE (stage 3 at SOC) Patient has history of non compliance with getting to MD for evaluation and does not keep dressing clean and dry. SN to instruct on potential adverse health effects of non compliance. SN for diabetes education.     Primary diagnoses/co-morbidities/recent procedures in past 60 days that impact current episode: Patient has diabetes mellitus, ESRD and goes to dialysis three times weekly, obesity, history of non compliance.    Current level of functional ability: patient ambulates under supervision with a walker    Homebound status and living arrangements: Patient has family member living with her to provide assistance.     Goals accomplished and/or m easurable progress toward unmet goals in past 60 days: no falls  Focus of care for next 60 days for each discipline ordered: SN for wound care and diabetes disease process management.     Skin integrity/wound status: wound not healing with s/s of infection     Code status: CPR    Most recent fall risk: at risk for falls: 7    Estimated date when home care services will end 11/04/2022    Plan of Care confirmed with PCP.

## 2022-09-13 DIAGNOSIS — I25.10 CORONARY ARTERY DISEASE INVOLVING NATIVE CORONARY ARTERY OF NATIVE HEART WITHOUT ANGINA PECTORIS: ICD-10-CM

## 2022-09-13 RX ORDER — RANOLAZINE 500 MG/1
500 TABLET, EXTENDED RELEASE ORAL EVERY 12 HOURS SCHEDULED
Qty: 180 TABLET | Refills: 3 | Status: SHIPPED | OUTPATIENT
Start: 2022-09-13 | End: 2022-12-13

## 2022-09-14 LAB — BACTERIA SPEC AEROBE CULT: NORMAL

## 2022-09-19 PROCEDURE — G0179 MD RECERTIFICATION HHA PT: HCPCS | Performed by: FAMILY MEDICINE

## 2022-10-06 DIAGNOSIS — E11.40 TYPE 2 DIABETES MELLITUS WITH DIABETIC NEUROPATHY, UNSPECIFIED WHETHER LONG TERM INSULIN USE: ICD-10-CM

## 2022-10-06 RX ORDER — ORAL SEMAGLUTIDE 7 MG/1
TABLET ORAL
Qty: 30 TABLET | Refills: 0 | Status: SHIPPED | OUTPATIENT
Start: 2022-10-06 | End: 2022-12-13

## 2022-10-18 DIAGNOSIS — I10 PRIMARY HYPERTENSION: ICD-10-CM

## 2022-10-18 RX ORDER — ISOSORBIDE MONONITRATE 30 MG/1
30 TABLET, EXTENDED RELEASE ORAL EVERY MORNING
Qty: 30 TABLET | Refills: 1 | Status: SHIPPED | OUTPATIENT
Start: 2022-10-18 | End: 2022-12-13

## 2022-10-27 ENCOUNTER — TRANSCRIBE ORDERS (OUTPATIENT)
Dept: HOME HEALTH SERVICES | Facility: HOME HEALTHCARE | Age: 62
End: 2022-10-27

## 2022-10-27 ENCOUNTER — TELEPHONE (OUTPATIENT)
Dept: FAMILY MEDICINE CLINIC | Facility: CLINIC | Age: 62
End: 2022-10-27

## 2022-10-27 ENCOUNTER — HOME HEALTH ADMISSION (OUTPATIENT)
Dept: HOME HEALTH SERVICES | Facility: HOME HEALTHCARE | Age: 62
End: 2022-10-27

## 2022-10-27 DIAGNOSIS — T81.89XD SURGICAL WOUND, NON HEALING, SUBSEQUENT ENCOUNTER: Primary | ICD-10-CM

## 2022-10-27 NOTE — TELEPHONE ENCOUNTER
received call from sunni with [facility]. Pt is being released from [facility] today. They are stating pt will need home health for nursing, pt, ot & wound vac care. Wanting to know if dr will follow & will need wound vac instructions. Can call sunni at [phone].    Thanks

## 2022-10-27 NOTE — TELEPHONE ENCOUNTER
Called Joselyn and let her know that I have not seen the patient since she had her toe amputated. She will try to contact surgeon for orders. Meanwhile, I asked our  to schedule patient for an appointment

## 2022-10-28 ENCOUNTER — HOME CARE VISIT (OUTPATIENT)
Dept: HOME HEALTH SERVICES | Facility: CLINIC | Age: 62
End: 2022-10-28

## 2022-10-28 VITALS
BODY MASS INDEX: 47.02 KG/M2 | OXYGEN SATURATION: 97 % | TEMPERATURE: 97.4 F | HEART RATE: 85 BPM | DIASTOLIC BLOOD PRESSURE: 74 MMHG | HEIGHT: 62 IN | RESPIRATION RATE: 20 BRPM | SYSTOLIC BLOOD PRESSURE: 126 MMHG

## 2022-10-28 VITALS
RESPIRATION RATE: 18 BRPM | OXYGEN SATURATION: 97 % | HEART RATE: 88 BPM | TEMPERATURE: 97.5 F | DIASTOLIC BLOOD PRESSURE: 80 MMHG | SYSTOLIC BLOOD PRESSURE: 126 MMHG

## 2022-10-28 PROCEDURE — G0299 HHS/HOSPICE OF RN EA 15 MIN: HCPCS

## 2022-10-28 PROCEDURE — G0152 HHCP-SERV OF OT,EA 15 MIN: HCPCS

## 2022-10-28 NOTE — CASE COMMUNICATION
"Medical Necessity and focus of care: Patient has diabetic with ESRD.  Dialysis M-W-F.  Patient had fall at home causing damage to toes and went to ER.  Patient had amputation to Left and Right extremities.  Patient sent to [facility] x 2 week and then home with home health.  Sn to place wound vac to left foot, paint right great toe, top of second toe and left heel with betadine and cover with gauze and wrap with kerlex 2 times w peter,   Caregiver Status: Patient lives with sister  Patient's goal(s): \"heal feet\"  GG Discharge Goal: transfer   Medication [place]: [facility] discharge does not match home medications.    Environmental/ Physical issues:  Any additional needs:    Based upon record review and collaboration conference, the recommended frequency for this patient is   SN-----2w5  PT-----eval and treat  OT----eval and treat  Provider___Dr. Macias_______ N otified @ __10/28/22_______ and agrees with POC   " Alert-The patient is alert, awake and responds to voice. The patient is oriented to time, place, and person. The triage nurse is able to obtain subjective information.

## 2022-10-28 NOTE — HOME HEALTH
OCCUPATIONAL THERAPY EVALUATION    -NO BP IN R UE  -NONWB L LE AND HEEL WB ON R LE-     REASON FOR REFERRAL:  61 Y/O FEMALE HOSPITALIZED [facility] 9/6/22-9/9/22 FOR GANGRENE BILATERAL FEET, SEPSIS, AND CELLULITIS.  SHE TRANSFERRED TO [facility] 9/9/22-10/8/22 FOR MORE EXTENSIVE TREATMENT OF MRSA WOUND INFECTION, S/P LEFT TRANSMETATARSAL AMPUATION AND R HALLUS AMPUTATION 9/14/22, S/P I&D LEFT METATARSAL RESECTION AND LEFT HEEL EXCISIONAL DEBRIDEMENT RIGHT FOOT 9/19/22, TOXIC/METABOLIC ENCEPHALOPATHY.  SHE TRANSFERRED TO [facility] 10/8/22-10/27/22.  SHE HAS WOUND VAC ON LEFT LE WITH B FEET DRESSINGS ON.  SHE IS AWARE OF HER WB RESTRICTIONS HOWEVER IS HAVING PROBLEMS FOLLOWING RESTRICTIONS.  SHE DOES HAVE 2 CAST SHOES ON WITH THE RIGHT CAST SHOE WITHOUT BOOTOM SUPPORT SO SHE WILL NOT BEAR WEIGHT THROUGH TOES.  SHE REPORTED THAT SHE WOULD LIKE TO MAKE SURE THAT SHE CAN GET INTO SHOWER WITH THE ASSISTANCE OF HER SISTER.  SHE STATED THAT SHE HAS THE PLASTIC COVERS TO PUT OVER HER FEET AND OVER HER DIALYSIS PORT SITE TO PREVENT THEM FROM GETTING WET.       PAST MEDICAL HISTORY:  ESRD-DIALYSIS DEPENDENT, DM WITH NEUROPATHY, CAD-S/P CARDIAC STENTING X3, HLD, MIKE, COPD, GERD, GI BLEED, HTN, PVD, MRSA, HYPOTHYROIDISM, ANXIETY, CAROTID ARTERY STENOSIS, L CAROTID STENT PLACEMENT, CABG X3, ANEMIA, BLADDER STONE LITHOTRIPSY, DIALYSIS FISTULA CREATION R UE.    HOME SOCIAL ENVIRONMENT: PATIENT LIVES IN ONE LEVEL APARTMENT WITH HER SISTER WITH NO STEPS TO ENTER EXIT HOME.  HER SPOUSE NO LONGER LIVES WITH HER AND HER SISTER HAS MOVED IN TO HELP WITH HER CARE.    PRIOR LEVEL OF FUNCTION:  INDEPENDENT WITH FUNCTIONAL TRANSFERS/FUNCTIONAL MOBILITY USING ROLLATOR WALKER, MINIMAL ASSISTANCE FOR SHOWER AND SHOWER CHAIR TRANSFER, INDEPENDENT WITH TOILET TRANSFER, SB(A) FOR SHOWER/DRESSING WITH SETUP ASSISTANCE.      CLINICAL FINDINGS:  UPPER EXTREMITY ASSESSMENT:  AROM B UES: WFL AND STRENGTH: 4+/5 GROSSLY THROUGHOUT.  /PINCH:   LEFT HAND  "DOMINANT, B  STRENGTHS: 4/5.                                     FINE MOTOR COORDINATION:  WFL              UPPER EXTREMITY GROSS MOTOR COORDINATION:  WFL  SENSATION:  INTACT B UES GROSSLY THROUGHOUT HOWEVER REPORTED NUMBNESS B FEET.                                                                  FUNCTIONAL ENDURANCE FOR SAFE ACTIVITIES OF DAILY LIVING/INSTRUMENTAL ACTIVITIES OF DAILY LIVING:  Limited endurance noted throughout evaluation requiring frequent rest breaks.       BALANCE:             SITTING BALANCE:  GOOD  STANDING BALANCE: FAIR-POOR  WEIGHT BEARING STATUS/PRECAUTIONS:  NONWB LEFT LE/HEEL WB RIGHT LE/HIGH FALL RISK  ASSISTIVE DEVICES: ROLLATOR WALKER, TRILOGY, TRANSPORT CHAIR, SHOWER CHAIR WITH BACK IN TUB/SHOWER COMBO WITH CURTAIN, GRAB BARS X2 IN SHOWER AND AROUND HANDICAPPED HEIGHT TOILET, SC, OXYGEN EQUIPMENT, LIFT CHAIR, REPORTED HAS MANUAL WHEELCHAIR AND HOSPITAL BED ORDERED.     MEDICAL NECESSITY:  PATIENT PRESENTS WITH HIGH FALL RISK AND DECREASED ADL INDEPENDENCE AFTER RECENT HOSPITALIZATIONS AND REHAB STAY FOR BILATERAL FEET GANGRENE, CEILLULTIS, SEPSIS,  MRSA WOUND INFECTION, S/P LEFT TRANSMETATARSAL AMPUATION AND R HALLUS AMPUTATION 9/14/22, S/P I&D LEFT METATARSAL RESECTION AND LEFT HEEL EXCISIONAL DEBRIDEMENT RIGHT FOOT 9/19/22, TOXIC/METABOLIC ENCEPHALOPATHY.  SHE REQUIRES SKILLED HOME BASED OT SERVICES FOR FUNCTIONAL TRANSFER TRAINING FOR ADLS, SAFETY EDUCATION/TRAINING, CAREGIVER EDUCATION FOR ASSISTING PATIENT WITH SHOWER CHAIR TRANSFER/ADLS.     PATIENT GOAL FOR THIS EPISODE OF CARE: \"BE ABLE TO GET INTO SHOWER WITHOUT FALLING\"  REHAB POTENTIAL :  [facility]  DATE OF NEXT APPOINTMENT WITH DOCTOR: 11/1/22 DR HERNANDEZ.  ANTICIPATED DISCHARGE PLAN:  TO SELF/CAREGIVER  PATIENT / CAREGIVER AGREE WITH DISCHARGE PLAN: YES  COMMUNICATION / CARE COORDINATION:  Case communication note to admitting RN with Functional [place] Scores/# of visits.  WISH TO ADDRESS BATHING WITH OT:  NO:  " ONLY FUNCTIONAL TRANSFERS TO GET INTO SHOWER/TOILET.

## 2022-10-31 ENCOUNTER — HOME CARE VISIT (OUTPATIENT)
Dept: HOME HEALTH SERVICES | Facility: CLINIC | Age: 62
End: 2022-10-31

## 2022-10-31 ENCOUNTER — TELEPHONE (OUTPATIENT)
Dept: FAMILY MEDICINE CLINIC | Facility: CLINIC | Age: 62
End: 2022-10-31

## 2022-10-31 VITALS
SYSTOLIC BLOOD PRESSURE: 115 MMHG | RESPIRATION RATE: 18 BRPM | HEART RATE: 91 BPM | TEMPERATURE: 97.4 F | DIASTOLIC BLOOD PRESSURE: 65 MMHG | OXYGEN SATURATION: 100 %

## 2022-10-31 PROCEDURE — G0151 HHCP-SERV OF PT,EA 15 MIN: HCPCS

## 2022-10-31 NOTE — HOME HEALTH
"Reason for referral: Pt presents a 61 y/o female s/p hospital at [facility] 9-6-22 to 9-9-22 for gangrene bilat feet, sepsis, cellulitis. Then [facility] 9-9 to 10-8-22 for left foot transmetatarsal amputation, right great toe amputation, other debridement, toxic encephalopathy. Then [facility] for rehab 10-8 to 10-27-22. Pt has wound vac on left foot, right cast shoe to unweight toes.  Past Med Hx:  End stage renal disease and dialysis dependent, DM, neuropathy, s/p cardiac stenting, HLD, MIKE, COPD, GERD, GI bleed, HTN, PVD, MRSA, hypothyroidism, anxiety, carotid artery stenosis and Left carotid stent placed, CABG x3, bladder stone lithotripsy. recent amputations    Patient Goal: \"I want to work on leg strength and getting around in my wheelchair when it comes.\"  Prior level of function: Pt and sister (caregiver) states she is doing better now than in last 2 years physically, doing her exercises, legs feel better with the stents in.  Numbness/tingling: feet and lower legs numbness with neuropathy  Precautions:  Restrictions: NO BP IN R UE, NWB L LE AND Heel only WB on R LE    dialysis [facility] in [place] for wound 11-1-22 but hope to transfer to more loal wound care,"

## 2022-11-01 ENCOUNTER — HOME CARE VISIT (OUTPATIENT)
Dept: HOME HEALTH SERVICES | Facility: HOME HEALTHCARE | Age: 62
End: 2022-11-01

## 2022-11-01 ENCOUNTER — HOME CARE VISIT (OUTPATIENT)
Dept: HOME HEALTH SERVICES | Facility: CLINIC | Age: 62
End: 2022-11-01

## 2022-11-01 NOTE — CASE COMMUNICATION
Patient missed a OT visit from [facility] on 11/1/22.     Reason: No answer at door      For your records only.   As per home health protocol, MD must be notified of missed/cancelled visits; therefore the prescribed frequency was not met.     Huy PASTOR  11/1/22

## 2022-11-02 ENCOUNTER — TELEPHONE (OUTPATIENT)
Dept: FAMILY MEDICINE CLINIC | Facility: CLINIC | Age: 62
End: 2022-11-02

## 2022-11-02 NOTE — TELEPHONE ENCOUNTER
Spoke with patient around 11:30AM. Patient is requesting refills on oxycodone. She informed me that she was given dilaudid and oxycodone during her last admission to the hospital in [place]. I let patient that we will not refill these medications due to her episode of bradyapnea and hypoxia. Moreover, I informed patient that we will no longer prescribe her gabapentin if she is asking for refills from different prescribe. The policy at our clinic is that patient will get controlled substances from us. Patient told me that she will be changing PCPs to someone in [place], [place] and she needs her medical records. I let her know that the new medical facility will help her with that. She told me that she will change PCP because we are not refilling her medications.

## 2022-11-02 NOTE — TELEPHONE ENCOUNTER
Patient has called asking for Dr Macias to return her call regarding her medications.  Her # is [phone].    ZAIRA Julio

## 2022-11-03 ENCOUNTER — HOME CARE VISIT (OUTPATIENT)
Dept: HOME HEALTH SERVICES | Facility: CLINIC | Age: 62
End: 2022-11-03

## 2022-11-03 VITALS
DIASTOLIC BLOOD PRESSURE: 80 MMHG | OXYGEN SATURATION: 98 % | HEART RATE: 90 BPM | TEMPERATURE: 98 F | SYSTOLIC BLOOD PRESSURE: 118 MMHG | RESPIRATION RATE: 18 BRPM

## 2022-11-03 PROCEDURE — G0158 HHC OT ASSISTANT EA 15: HCPCS

## 2022-11-03 NOTE — CASE COMMUNICATION
Pt./CG aware and agreeable to D/C next session.  Pt. likely to meet all goals.    Huy PASTOR  11/3/22

## 2022-11-05 ENCOUNTER — HOME CARE VISIT (OUTPATIENT)
Dept: HOME HEALTH SERVICES | Facility: HOME HEALTHCARE | Age: 62
End: 2022-11-05

## 2022-11-06 ENCOUNTER — HOME CARE VISIT (OUTPATIENT)
Dept: HOME HEALTH SERVICES | Facility: CLINIC | Age: 62
End: 2022-11-06

## 2022-11-06 VITALS
TEMPERATURE: 96 F | RESPIRATION RATE: 20 BRPM | DIASTOLIC BLOOD PRESSURE: 72 MMHG | OXYGEN SATURATION: 99 % | HEART RATE: 100 BPM | SYSTOLIC BLOOD PRESSURE: 128 MMHG

## 2022-11-06 PROCEDURE — G0299 HHS/HOSPICE OF RN EA 15 MIN: HCPCS

## 2022-11-07 ENCOUNTER — TRANSCRIBE ORDERS (OUTPATIENT)
Dept: WOUND CARE | Facility: HOSPITAL | Age: 62
End: 2022-11-07

## 2022-11-07 DIAGNOSIS — N18.6 ESRD ON HEMODIALYSIS: ICD-10-CM

## 2022-11-07 DIAGNOSIS — E03.9 HYPOTHYROIDISM, UNSPECIFIED TYPE: ICD-10-CM

## 2022-11-07 DIAGNOSIS — T81.31XD DEHISCENCE OF OPERATIVE WOUND, SUBSEQUENT ENCOUNTER: Primary | ICD-10-CM

## 2022-11-07 DIAGNOSIS — I96 GANGRENOUS TOE: ICD-10-CM

## 2022-11-07 DIAGNOSIS — E66.01 MORBID OBESITY: ICD-10-CM

## 2022-11-07 DIAGNOSIS — Z99.2 ESRD ON HEMODIALYSIS: ICD-10-CM

## 2022-11-10 ENCOUNTER — TRANSCRIBE ORDERS (OUTPATIENT)
Dept: PODIATRY | Facility: CLINIC | Age: 62
End: 2022-11-10

## 2022-11-10 ENCOUNTER — HOME CARE VISIT (OUTPATIENT)
Dept: HOME HEALTH SERVICES | Facility: CLINIC | Age: 62
End: 2022-11-10

## 2022-11-10 VITALS
SYSTOLIC BLOOD PRESSURE: 146 MMHG | RESPIRATION RATE: 16 BRPM | DIASTOLIC BLOOD PRESSURE: 80 MMHG | HEART RATE: 88 BPM | TEMPERATURE: 98.4 F | OXYGEN SATURATION: 95 %

## 2022-11-10 VITALS
OXYGEN SATURATION: 96 % | TEMPERATURE: 97.7 F | SYSTOLIC BLOOD PRESSURE: 137 MMHG | HEART RATE: 86 BPM | RESPIRATION RATE: 18 BRPM | DIASTOLIC BLOOD PRESSURE: 72 MMHG

## 2022-11-10 DIAGNOSIS — L97.519 ULCER OF RIGHT FOOT, WITH UNSPECIFIED SEVERITY: Primary | ICD-10-CM

## 2022-11-10 PROCEDURE — G0158 HHC OT ASSISTANT EA 15: HCPCS

## 2022-11-10 PROCEDURE — G0299 HHS/HOSPICE OF RN EA 15 MIN: HCPCS

## 2022-11-10 PROCEDURE — G0157 HHC PT ASSISTANT EA 15: HCPCS

## 2022-11-14 VITALS
RESPIRATION RATE: 18 BRPM | OXYGEN SATURATION: 97 % | SYSTOLIC BLOOD PRESSURE: 137 MMHG | TEMPERATURE: 98.1 F | DIASTOLIC BLOOD PRESSURE: 76 MMHG | HEART RATE: 87 BPM

## 2022-11-14 NOTE — HOME HEALTH
Patient states that dressing herself is tough and phantom pain is my biggest challenge. I did help make the bed this morning.

## 2022-11-15 ENCOUNTER — HOME CARE VISIT (OUTPATIENT)
Dept: HOME HEALTH SERVICES | Facility: HOME HEALTHCARE | Age: 62
End: 2022-11-15

## 2022-11-15 ENCOUNTER — OFFICE VISIT (OUTPATIENT)
Dept: WOUND CARE | Facility: HOSPITAL | Age: 62
End: 2022-11-15

## 2022-11-15 PROCEDURE — G0463 HOSPITAL OUTPT CLINIC VISIT: HCPCS

## 2022-11-15 PROCEDURE — 97605 NEG PRS WND THER DME<=50SQCM: CPT

## 2022-11-17 ENCOUNTER — HOME CARE VISIT (OUTPATIENT)
Dept: HOME HEALTH SERVICES | Facility: CLINIC | Age: 62
End: 2022-11-17

## 2022-11-17 PROCEDURE — G0299 HHS/HOSPICE OF RN EA 15 MIN: HCPCS

## 2022-11-17 PROCEDURE — G0157 HHC PT ASSISTANT EA 15: HCPCS

## 2022-11-18 VITALS
TEMPERATURE: 98.5 F | RESPIRATION RATE: 20 BRPM | OXYGEN SATURATION: 95 % | DIASTOLIC BLOOD PRESSURE: 68 MMHG | SYSTOLIC BLOOD PRESSURE: 110 MMHG | HEART RATE: 65 BPM

## 2022-11-18 VITALS
DIASTOLIC BLOOD PRESSURE: 84 MMHG | RESPIRATION RATE: 18 BRPM | OXYGEN SATURATION: 96 % | SYSTOLIC BLOOD PRESSURE: 148 MMHG | HEART RATE: 85 BPM | TEMPERATURE: 97.3 F

## 2022-11-21 ENCOUNTER — HOME CARE VISIT (OUTPATIENT)
Dept: HOME HEALTH SERVICES | Facility: CLINIC | Age: 62
End: 2022-11-21

## 2022-11-21 VITALS — HEART RATE: 88 BPM | OXYGEN SATURATION: 100 % | TEMPERATURE: 97.5 F | RESPIRATION RATE: 16 BRPM

## 2022-11-21 PROCEDURE — G0299 HHS/HOSPICE OF RN EA 15 MIN: HCPCS

## 2022-11-22 ENCOUNTER — HOME CARE VISIT (OUTPATIENT)
Dept: HOME HEALTH SERVICES | Facility: CLINIC | Age: 62
End: 2022-11-22

## 2022-11-22 PROCEDURE — G0157 HHC PT ASSISTANT EA 15: HCPCS

## 2022-11-23 ENCOUNTER — HOME CARE VISIT (OUTPATIENT)
Dept: HOME HEALTH SERVICES | Facility: CLINIC | Age: 62
End: 2022-11-23

## 2022-11-23 VITALS
OXYGEN SATURATION: 96 % | DIASTOLIC BLOOD PRESSURE: 70 MMHG | SYSTOLIC BLOOD PRESSURE: 112 MMHG | HEART RATE: 88 BPM | RESPIRATION RATE: 16 BRPM | TEMPERATURE: 97.4 F

## 2022-11-23 VITALS
SYSTOLIC BLOOD PRESSURE: 133 MMHG | RESPIRATION RATE: 18 BRPM | OXYGEN SATURATION: 97 % | HEART RATE: 52 BPM | TEMPERATURE: 97.6 F | DIASTOLIC BLOOD PRESSURE: 70 MMHG

## 2022-11-23 PROCEDURE — G0299 HHS/HOSPICE OF RN EA 15 MIN: HCPCS

## 2022-11-25 NOTE — CASE COMMUNICATION
PT WAS REFERRED TO HOME HEALTH FOLLOWING: Pt presents a 61 y/o female s/p hospital at [facility] 9-6-22 to 9-9-22 for gangrene bilat feet, sepsis, cellulitis. Then [facility] 9-9 to 10-8-22 for left foot transmetatarsal amputation, right great toe amputation, other debridement, toxic encephalopathy. Then [facility] for rehab 10-8 to 10-27-22. Pt has wound vac on left foot, right cast shoe to unweight toes.    PRIOR VS CURRENT LEVEL OF FUNCTION :    GAIT: Non-ambulatory due to weight bearing restrictions    T/F: Sit pivot t/f with mod assist on eval. Sit pivot t/f with independence on D/C     BED MOBILITY: Slept in recliner on eval. Sit to sup to sit from hospital bed with independence on D/C.    DISCHARGE CONDITION: GOOD    DISCHARGE REASON: GOALS MET    DISCHARGE TO SELF-CARE WITH FAMILY ASSIST AS NEEDED.      NEXT MD VISIT: 12/2/2022 8:00 AM Jean Oliveira DPM

## 2022-11-28 ENCOUNTER — HOME CARE VISIT (OUTPATIENT)
Dept: HOME HEALTH SERVICES | Facility: CLINIC | Age: 62
End: 2022-11-28

## 2022-11-28 VITALS
OXYGEN SATURATION: 99 % | RESPIRATION RATE: 18 BRPM | HEART RATE: 85 BPM | TEMPERATURE: 97.6 F | DIASTOLIC BLOOD PRESSURE: 88 MMHG | SYSTOLIC BLOOD PRESSURE: 152 MMHG

## 2022-11-28 PROCEDURE — G0299 HHS/HOSPICE OF RN EA 15 MIN: HCPCS

## 2022-11-30 ENCOUNTER — HOME CARE VISIT (OUTPATIENT)
Dept: HOME HEALTH SERVICES | Facility: CLINIC | Age: 62
End: 2022-11-30

## 2022-11-30 PROCEDURE — G0299 HHS/HOSPICE OF RN EA 15 MIN: HCPCS

## 2022-11-30 NOTE — PLAN OF CARE
Problem: Adult Inpatient Plan of Care  Goal: Plan of Care Review  Outcome: Ongoing, Progressing  Flowsheets  Taken 9/9/2022 1447  Plan of Care Reviewed With: patient  Taken 9/9/2022 0955  Progress: improving  Plan of Care Reviewed With: patient  Outcome Evaluation: Pt agreed to therapy. Supine in bed upon entry to room. UB bathing and dressing Independent. Grooming Independent. UB ROM with 1 lb wt 10X2. tolerated well. All needs in reach. Continue OT POC.     Problem: Adult Inpatient Plan of Care  Goal: Plan of Care Review  Outcome: Ongoing, Progressing  Flowsheets  Taken 9/9/2022 1447  Plan of Care Reviewed With: patient  Taken 9/9/2022 0955  Progress: improving  Plan of Care Reviewed With: patient  Outcome Evaluation: Pt agreed to therapy. Supine in bed upon entry to room. UB bathing and dressing Independent. Grooming Independent. UB ROM with 1 lb wt 10X2. tolerated well. All needs in reach. Continue OT POC.   Goal Outcome Evaluation:  Plan of Care Reviewed With: patient        Progress: improving  Outcome Evaluation: Pt agreed to therapy. Supine in bed upon entry to room. UB bathing and dressing Independent. Grooming Independent. UB ROM with 1 lb wt 10X2. tolerated well. All needs in reach. Continue OT POC.   VM is full.  Sent SMS notification.

## 2022-12-01 VITALS
TEMPERATURE: 97 F | OXYGEN SATURATION: 97 % | SYSTOLIC BLOOD PRESSURE: 130 MMHG | DIASTOLIC BLOOD PRESSURE: 88 MMHG | RESPIRATION RATE: 22 BRPM | HEART RATE: 85 BPM

## 2022-12-02 ENCOUNTER — OFFICE VISIT (OUTPATIENT)
Dept: CARDIAC SURGERY | Facility: CLINIC | Age: 62
End: 2022-12-02

## 2022-12-02 ENCOUNTER — HOSPITAL ENCOUNTER (OUTPATIENT)
Dept: GENERAL RADIOLOGY | Facility: HOSPITAL | Age: 62
Discharge: HOME | End: 2022-12-02

## 2022-12-02 ENCOUNTER — OFFICE VISIT (OUTPATIENT)
Dept: WOUND CARE | Facility: HOSPITAL | Age: 62
End: 2022-12-02

## 2022-12-02 ENCOUNTER — OUTSIDE FACILITY SERVICE (OUTPATIENT)
Dept: PODIATRY | Facility: CLINIC | Age: 62
End: 2022-12-02

## 2022-12-02 VITALS
OXYGEN SATURATION: 97 % | BODY MASS INDEX: 47.29 KG/M2 | HEIGHT: 62 IN | HEART RATE: 86 BPM | WEIGHT: 257 LBS | SYSTOLIC BLOOD PRESSURE: 144 MMHG | DIASTOLIC BLOOD PRESSURE: 70 MMHG

## 2022-12-02 DIAGNOSIS — T81.31XD POSTOPERATIVE WOUND DEHISCENCE, SUBSEQUENT ENCOUNTER: ICD-10-CM

## 2022-12-02 DIAGNOSIS — E11.621 DIABETIC FOOT ULCER WITH OSTEOMYELITIS: ICD-10-CM

## 2022-12-02 DIAGNOSIS — I73.9 PVD (PERIPHERAL VASCULAR DISEASE) WITH CLAUDICATION: Primary | ICD-10-CM

## 2022-12-02 DIAGNOSIS — E11.69 DIABETIC FOOT ULCER WITH OSTEOMYELITIS: ICD-10-CM

## 2022-12-02 DIAGNOSIS — L97.509 DIABETIC FOOT ULCER WITH OSTEOMYELITIS: ICD-10-CM

## 2022-12-02 DIAGNOSIS — I65.23 CAROTID STENOSIS, ASYMPTOMATIC, BILATERAL: ICD-10-CM

## 2022-12-02 DIAGNOSIS — E11.52 TYPE 2 DIABETES MELLITUS WITH DIABETIC PERIPHERAL ANGIOPATHY WITH GANGRENE, UNSPECIFIED WHETHER LONG TERM INSULIN USE: ICD-10-CM

## 2022-12-02 DIAGNOSIS — M86.9 DIABETIC FOOT ULCER WITH OSTEOMYELITIS: ICD-10-CM

## 2022-12-02 DIAGNOSIS — E78.2 MIXED HYPERLIPIDEMIA: ICD-10-CM

## 2022-12-02 PROBLEM — E11.59 TYPE 2 DIABETES MELLITUS WITH CIRCULATORY DISORDER: Status: ACTIVE | Noted: 2022-01-11

## 2022-12-02 PROCEDURE — G0463 HOSPITAL OUTPT CLINIC VISIT: HCPCS

## 2022-12-02 PROCEDURE — 99214 OFFICE O/P EST MOD 30 MIN: CPT | Performed by: PODIATRIST

## 2022-12-02 PROCEDURE — 99214 OFFICE O/P EST MOD 30 MIN: CPT | Performed by: NURSE PRACTITIONER

## 2022-12-02 PROCEDURE — 73630 X-RAY EXAM OF FOOT: CPT

## 2022-12-02 NOTE — PROGRESS NOTES
CVTS Office Progress Note     12/2/2022    Yamileth Slater  1960    Chief Complaint:  PVD    HPI:      PCP:  Kiersten Wilson APRN  Cardiology: Dr. Davis  Nephrology:  Dr. Jacobo, Monday Wednesday Friday dialysis via   Podiatry: Dr. Oliveira    62 y.o. female with HTN(stable, increased risk stroke, rupture), Hyperlipidemia(stable, increased risk cardiovascular events), Diabetes Mellitus(stable, increased risk cardiovascular events), Obesity(uncontrolled, increased risk cardiovascular events) and Chronic Kidney Disease(stable, increased risk renal failure), COPD with chronic, O2 use over the last 3 years, coronary artery disease multiple interventions, peripheral vascular disease multiple distal intervention, poorly controlled diabetes with diabetic foot infections she is status post transmit, delayed wound healing, history of MRSA CRE, on dialysis, poor follow-up.  Had prior appointment with us to evaluate for placement of AV fistula patient canceled appointment.  former smoker.  Quit 1999.  Patient established with vascular surgery today at the referral of Dr. Oliveira from wound care.  Patient is medically complex, multiple hospitalizations over the past few years.  Most recent hospitalization in September 2022 secondary to diabetic foot infections was [place] [facility] [place] [facility].  She subsequently underwent vascular evaluation and bilateral lower extremity arteriogram with intervention to both distal SFA and popliteal, she also went left transmetatarsal amputation at that time.  Surgical site dehiscence of left transmit site has had prolonged wound care with poor healing.  Reports today with [facility] to evaluate perfusion status as it pertains to her healing potential and whether or not she is a candidate for additional revascularization should that be indicated no other associated signs, symptoms or modifying factors.    2013 CABG x3  2014  attempted left carotid endarterectomy, procedure aborted unable to find carotid (Dr. Aguayo)  5/2014 L Transfemoral stent (Dr. Robles)  2014 arteriogram: PTA right SFA stent (Dr. Robles)  11/2021 LHC: Distal left main ostial circumflex stenosis, patent LIMA to LAD, 100% occluded RCA with occluded SVG to RCA  11/2021 PTA stent distal left main [facility]    1/2022 tunnel cath placement    9/15/2022 bilateral lower extremity arteriogram: PTA proximal SFA, PTA and stent stenosis right SFA, PTA/stent right popliteal.  Left SFA PTA stent Olevia 6 x 40 mm 6 x 120 mm due to dissection, in stent stenosis post intervention [facility]  9/19/2022 left transmetatarsal amputation [facility]    12/2022 [facility]: Right 0.55 biphasic.  Left 0.5 triphasic femoral, biphasic popliteal, monophasic distally.  Limited evaluation of waveforms secondary to heart rhythm and body habitus    The following portions of the patient's history were reviewed and updated as appropriate: allergies, current medications, past family history, past medical history, past social history, past surgical history and problem list.  Recent images independently reviewed.  Available laboratory values reviewed.    PMH:  Past Medical History:   Diagnosis Date   • Acute blood loss anemia 4/16/2017    Likely due to gastric oozing at this time. - Dr. Duarte (GI) was consulted and has now signed off, will follow up outpatient - pill colonoscopy showed AVMs - continue to monitor   • Acute cystitis with hematuria 3/31/2021    1/13: IV Rocephin 1 gm q 24 1/14 : transitioned  to omnicef 300mg. Urine cultures resulted and did not show growth. Omnicef discontinued as patient is asymptomatic   • Altered mental status 1/9/2022    - AMS on presentation - initial ABG pH 7.3, CO2 34 - Procal 0.29 - UA negative for acute cysitits -CTA head wnl  - Empiric Zosyn and Vancomycin -Lactate 2.5 on admission  - blood cultures no growth at 24 hours     • Anxiety    • CAD  (coronary artery disease) 4/24/2021    S/P 3 stents 5/1/2021 for [facility]HL Continue ASA 81mg & Clopidogrel 75mg Continue Atorvastatin 40mg   • Carotid artery stenosis    • Chronic obstructive lung disease ([facility])    • CKD (chronic kidney disease) stage 4, GFR 15-29 ml/min ([facility])    • CKD (chronic kidney disease), symptom management only, stage 5 ([facility]) 10/5/2020    Results from last 7 days Lab Units 12/15/21 0548 12/14/21 1323 12/14/21 0916 CREATININE mg/dL 3.92* 3.21* 3.32*  Baseline creatinine 2-3 GFR 13-25 GFR 15 Dialysis MWF, sees Dr. Lauren Nephrology consult,, appreciate recommendations Continue Bumex 1mg bid daily Holding Bumex 2mg 4 times a week   • Colonic polyp    • Coronary arteriosclerosis    • Diabetes mellitus ([facility])    • Diabetic neuropathy ([facility])    • Ear pain, right 10/18/2021    - canal trauma due to patient scratching and DMT2 - added cortisporin ear drops   • Elevated troponin 10/12/2021    -most likely from CKD -Trending down -Neg chest pain   • Generalized abdominal pain 7/1/2022    Could be due to initiation of tube feeds vs dyspepsia vs abdominal cramps related to no PO intake due to intubation vs constipation Continue current laxative regiment  If no bowel movement by this afternoon will consider enema   • GERD (gastroesophageal reflux disease)    • GI bleed 5/13/2021    - GI will follow up outpatient - Protonix 40mg daily - Avoid medical DVT prophy and use mechanical at this time instead. - Continue to monitor - pill colonoscopy results showed AVMs   • History of transfusion    • Hypercholesterolemia    • Hyperosmolar hyperglycemic state (HHS) ([facility]) 6/25/2022    Serum glucose 605 on admission  Anion gap 16 PH 7.37 Bicarb 27.9 Continue fluids  Insulin drip with HHS protocol  Anion gap closed around 10 AM, received one dose of Levamir subq, will stop insulin drip after 2 hrs     • Hypertension    • Hypokalemia 5/27/2022    Will replace as needed. Will be cautious in the setting of ESRD to avoid need for  emergency dialysis   Monitor Qtc intervals on EKG     • Hypomagnesemia 6/27/2021    Monitor and replace   • Morbid obesity ([facility])    • Nephrolithiasis    • On mechanically assisted ventilation ([facility]) 6/26/2022    Vent management and sedation orders placed.  - [facility] intensivist group consulted for vent management appreciate recommendations  - plan to extubate today     • Peripheral vascular disease ([facility])    • Pleural effusion on right 6/26/2022    CXR on 6/30/22 read as a small upper left pulmonary edema vs early pneumonia.  Last Echo 1/2022 EF 61-65 % Continue to monitor  Procal slightly improved, CRP improved On Linezolid and meropenum      • SIRS (systemic inflammatory response syndrome) ([facility]) 1/9/2022    Admission  - WBC 17.78   -   - RR 16 - 1/10: VSS/wnl - CXR - Mild pulm edema - Blood cultures no growth at 24 hours  - Procalcitonin 0.29 - UA : glucose 1000, negative Leucocytes/nitrate - Empiric Zosyn and Vancomcyin    • Sleep apnea    • Substance abuse ([facility])    • Vitamin D deficiency      Past Surgical History:   Procedure Laterality Date   • CARDIAC CATHETERIZATION N/A 7/14/2020   • CARDIAC CATHETERIZATION N/A 4/23/2021    Procedure: Left Heart Cath;  Surgeon: Melba Romo MD;  Location: [facility] INVASIVE LOCATION;  Service: Cardiology;  Laterality: N/A;   • CARDIAC CATHETERIZATION N/A 4/30/2021    Procedure: Percutaneous Coronary Intervention;  Surgeon: Russell Voss MD;  Location: [facility] CATH INVASIVE LOCATION;  Service: Cardiovascular;  Laterality: N/A;   • CARDIAC CATHETERIZATION N/A 4/30/2021    Procedure: Stent NIKKI coronary;  Surgeon: Russell Voss MD;  Location: [facility] CATH INVASIVE LOCATION;  Service: Cardiovascular;  Laterality: N/A;   • CARDIAC CATHETERIZATION Left 11/13/2021    Procedure: Left Heart Cath;  Surgeon: Niall Rios MD;  Location: [facility] INVASIVE LOCATION;  Service: Cardiology;  Laterality: Left;   • CAROTID STENT Left    • COLONOSCOPY      • COLONOSCOPY N/A 5/14/2021    Procedure: COLONOSCOPY;  Surgeon: Mingo Duarte MD;  Location: [facility] ENDOSCOPY;  Service: Gastroenterology;  Laterality: N/A;   • CORONARY ARTERY BYPASS GRAFT N/A 2013    CABG X 3   • CYSTOSCOPY BLADDER STONE LITHOTRIPSY Bilateral    • ENDOSCOPY N/A 4/12/2021    Procedure: ESOPHAGOGASTRODUODENOSCOPY;  Surgeon: Mingo Duarte MD;  Location: [facility] ENDOSCOPY;  Service: Gastroenterology;  Laterality: N/A;   • ENDOSCOPY N/A 5/14/2021    Procedure: ESOPHAGOGASTRODUODENOSCOPY;  Surgeon: Mingo Duarte MD;  Location: [facility] ENDOSCOPY;  Service: Gastroenterology;  Laterality: N/A;   • ENDOSCOPY N/A 1/28/2022    Procedure: ESOPHAGOGASTRODUODENOSCOPY;  Surgeon: Mingo Duarte MD;  Location: [facility] ENDOSCOPY;  Service: Gastroenterology;  Laterality: N/A;   • INTERVENTIONAL RADIOLOGY PROCEDURE N/A 10/21/2021    Procedure: tunneled central venous catheter placement;  Surgeon: Donnie Robles MD;  Location: [facility]GIO INVASIVE LOCATION;  Service: Interventional Radiology;  Laterality: N/A;   • INTERVENTIONAL RADIOLOGY PROCEDURE N/A 1/27/2022    Procedure: tunneled central venous catheter placement;  Surgeon: Donnie Robles MD;  Location: [facility] INVASIVE LOCATION;  Service: Interventional Radiology;  Laterality: N/A;     Family History   Problem Relation Age of Onset   • Heart disease Mother    • Lung cancer Mother    • Heart disease Father    • Heart attack Father    • Diabetes Father    • Heart disease Half-Sister         Dad's side   • Heart disease Brother    • No Known Problems Sister    • No Known Problems Sister    • No Known Problems Sister    • No Known Problems Sister    • No Known Problems Sister    • Pancreatic cancer Half-Sister         Dad's side   • No Known Problems Brother    • No Known Problems Brother    • Heart attack Half-Brother    • Heart attack Half-Brother    • No Known Problems Maternal Grandmother    • No Known Problems Maternal  Grandfather    • No Known Problems Paternal Grandmother    • No Known Problems Paternal Grandfather      Social History     Tobacco Use   • Smoking status: Former     Packs/day: 0.25     Years: 46.00     Pack years: 11.50     Types: Cigarettes     Start date: 1999     Quit date: 2/15/2022     Years since quittin.7   • Smokeless tobacco: Never   • Tobacco comments:     only smoking 5 a day - quit 2021   Substance Use Topics   • Alcohol use: No   • Drug use: Not Currently     Types: LSD, Marijuana, Methamphetamines       ALLERGIES:  Allergies   Allergen Reactions   • Adhesive Tape Hives, Other (See Comments) and Rash   • Nsaids Hives   • Latex Other (See Comments) and Hives   • Other Itching     Bandaids, MRSA, SURECLOSE   • Wound Dressing Adhesive Hives and Rash         MEDICATIONS:    Current Outpatient Medications:   •  acetaminophen (Tylenol 8 Hour) 650 MG 8 hr tablet, Take 1 tablet by mouth Every 8 (Eight) Hours As Needed for Mild Pain ., Disp: 90 tablet, Rfl: 0  •  albuterol (PROVENTIL) (2.5 MG/3ML) 0.083% nebulizer solution, Inhale the contents of 1 vial by nebulization Every 4 (Four) Hours As Needed for Wheezing., Disp: 75 mL, Rfl: 3  •  albuterol sulfate  (90 Base) MCG/ACT inhaler, Inhale 2 puffs Every 4 (Four) Hours As Needed for Wheezing., Disp: 18 g, Rfl: 1  •  atorvastatin (LIPITOR) 20 MG tablet, Take 1 tablet by mouth every night at bedtime., Disp: 90 tablet, Rfl: 0  •  B Complex-C-Folic Acid (RENAL VITAMIN PO), Take 1 tablet by mouth Daily., Disp: , Rfl:   •  Blood Glucose Monitoring Suppl ([employer] Blood Glucose Meter) w/Device kit, 1 each 3 (Three) Times a Day., Disp: 1 kit, Rfl: 3  •  bumetanide (BUMEX) 2 MG tablet, Take 1 tablet by mouth 4 (Four) Times a Week. Take on non-hemodialysis days., Disp: 16 tablet, Rfl: 0  •  Capsaicin 0.035 % cream, Apply 3 g topically 3 (Three) Times a Day As Needed (ankle pain)., Disp: 42.5 g, Rfl: 2  •  Cetirizine HCl 10 MG capsule, Take 1 capsule by  "mouth Daily., Disp: , Rfl:   •  clopidogrel (PLAVIX) 75 MG tablet, Take 1 tablet by mouth Daily., Disp: 30 tablet, Rfl: 5  •  collagenase (Santyl) 250 UNIT/GM ointment, Apply 1 application topically to the appropriate area as directed Daily., Disp: 90 g, Rfl: 2  •  CYCLOBENZAPRINE HCL PO, Take 5 mg by mouth Daily., Disp: , Rfl:   •  dextrose (D50W) 50 % solution, Infuse 50 mL into a venous catheter Every 15 (Fifteen) Minutes As Needed for Low Blood Sugar (Blood Sugar Less Than 70)., Disp: 50 mL, Rfl: 0  •  Diclofenac Sodium (VOLTAREN) 1 % gel gel, Apply 4 g topically to the appropriate area as directed 4 (Four) Times a Day As Needed (Ankle pain)., Disp: 350 g, Rfl: 2  •  docusate sodium (COLACE) 100 MG capsule, Take 100 mg by mouth 2 (Two) Times a Day., Disp: , Rfl:   •  DULoxetine (CYMBALTA) 20 MG capsule, Take 20 mg by mouth Daily. Indications: Disease of the Peripheral Nerves, Disp: , Rfl:   •  Easy Touch Insulin Syringe 30G X 5/16\" 0.5 ML misc, USE AS DIRECTED WITH LEVEMIR, Disp: , Rfl:   •  EASY TOUCH PEN NEEDLES 31G X 8 MM misc, , Disp: , Rfl:   •  gabapentin (NEURONTIN) 300 MG capsule, TAKE 1 CAPSULE BY MOUTH DAILY. AND AN EXTRA PILL MONDAY/WEDNESDAY/FRIDAY - DIALYSIS DAYS, Disp: 45 capsule, Rfl: 2  •  glucose blood test strip, Use as instructed, Disp: 100 each, Rfl: 12  •  hydrOXYzine (ATARAX) 25 MG tablet, Take 25 mg by mouth Every 8 (Eight) Hours As Needed for Itching., Disp: , Rfl:   •  insulin detemir (LEVEMIR) 100 UNIT/ML injection, Inject 25 Units under the skin into the appropriate area as directed Every Night. (Patient taking differently: Inject 24 Units under the skin into the appropriate area as directed 2 (Two) Times a Day. Indications: Type 2 Diabetes), Disp: 3 mL, Rfl: 12  •  Insulin Lispro, 1 Unit Dial, (HUMALOG) 100 UNIT/ML solution pen-injector, Inject 12 Units under the skin into the appropriate area as directed 3 (Three) Times a Day With Meals. (Patient taking differently: Inject 14 Units " under the skin into the appropriate area as directed 3 (Three) Times a Day With Meals. Sliding scale:  150-199 take 4 units 200-249 take 8 units 250-299 take 12 units 300-349 take 16 units 350-400 take 20 units greater than 400, call physician), Disp: 30 mL, Rfl: 12  •  Insulin Pen Needle (NovoFine) 30G X 8 MM misc, As directed 4 times daily, Disp: 100 each, Rfl: 11  •  Insulin Pen Needle 31G X 8 MM misc, Use to inject insulin 4 (Four) Times a Day as directed., Disp: 120 each, Rfl: 12  •  ipratropium-albuterol (DUO-NEB) 0.5-2.5 mg/3 ml nebulizer, Take 3 mL by nebulization 4 (Four) Times a Day., Disp: , Rfl:   •  isosorbide mononitrate (IMDUR) 30 MG 24 hr tablet, TAKE 1 TABLET BY MOUTH EVERY MORNING., Disp: 30 tablet, Rfl: 1  •  levothyroxine (SYNTHROID, LEVOTHROID) 25 MCG tablet, Take 25 mcg by mouth Daily., Disp: , Rfl:   •  lisinopril (PRINIVIL,ZESTRIL) 20 MG tablet, Take 1 tablet by mouth Daily. (Patient taking differently: Take 10 mg by mouth Daily.), Disp: 90 tablet, Rfl: 0  •  losartan (COZAAR) 100 MG tablet, Take 100 mg by mouth Daily., Disp: , Rfl:   •  memantine (NAMENDA) 5 MG tablet, Take 5 mg by mouth 2 (Two) Times a Day., Disp: , Rfl:   •  Methoxy PEG-Epoetin Beta (MIRCERA IJ), 150 mcg Every 14 (Fourteen) Days., Disp: , Rfl:   •  Methoxy PEG-Epoetin Beta (MIRCERA IJ), 225 mcg Every 14 (Fourteen) Days., Disp: , Rfl:   •  metoprolol tartrate (LOPRESSOR) 25 MG tablet, Take 1 tablet by mouth Every 12 (Twelve) Hours., Disp: 180 tablet, Rfl: 0  •  montelukast (SINGULAIR) 10 MG tablet, Take 1 tablet by mouth Every Night., Disp: 90 tablet, Rfl: 0  •  muscle rub (BenGay) 10-15 % cream cream, Apply 1 application topically to the appropriate area as directed 4 (Four) Times a Day As Needed for buttock pain., Disp: 85 g, Rfl: 1  •  naloxone (NARCAN) 0.4 MG/ML injection, Infuse 1 mL into a venous catheter Every 5 (Five) Minutes As Needed for Respiratory Depression., Disp: , Rfl:   •  nitroglycerin (NITROSTAT) 0.4 MG  SL tablet, Place 0.4 mg under the tongue Every 5 (Five) Minutes As Needed for Chest Pain (x 3 doses)., Disp: , Rfl:   •  O2 (OXYGEN), Inhale 3 L/min Continuous., Disp: , Rfl:   •  ondansetron ODT (Zofran ODT) 4 MG disintegrating tablet, Place 1 tablet on the tongue Every 8 (Eight) Hours As Needed for Nausea or Vomiting., Disp: 30 tablet, Rfl: 0  •  oxybutynin XL (DITROPAN-XL) 5 MG 24 hr tablet, Take 1 tablet by mouth Daily., Disp: 90 tablet, Rfl: 0  •  oxyCODONE (ROXICODONE) 10 MG tablet, Take 10 mg by mouth 4 (Four) Times a Day As Needed for Moderate Pain or Severe Pain., Disp: , Rfl:   •  oxyCODONE-acetaminophen (PERCOCET) 5-325 MG per tablet, Take 2 tablets by mouth Every 6 (Six) Hours As Needed for Moderate Pain., Disp: , Rfl:   •  pantoprazole (PROTONIX) 40 MG EC tablet, Take 1 tablet by mouth Every Night., Disp: 90 tablet, Rfl: 0  •  promethazine (PHENERGAN) 25 MG tablet, Take 25 mg by mouth Every 6 (Six) Hours As Needed for Nausea or Vomiting., Disp: , Rfl:   •  ranolazine (RANEXA) 500 MG 12 hr tablet, Take 1 tablet by mouth Every 12 (Twelve) Hours., Disp: 180 tablet, Rfl: 3  •  Rybelsus 7 MG tablet, TAKE ONE TABLET BY MOUTH ONCE DAILY, Disp: 30 tablet, Rfl: 0  •  sevelamer (RENVELA) 800 MG tablet, Take 800 mg by mouth 3 (Three) Times a Day With Meals., Disp: , Rfl:   •  sodium bicarbonate 650 MG tablet, Take 1 tablet by mouth., Disp: , Rfl:   •  torsemide (DEMADEX) 20 MG tablet, Take 20 mg by mouth 2 (Two) Times a Day., Disp: , Rfl:   •  Vitamin D, Ergocalciferol, 77614 units capsule, Take 50,000 Units by mouth 1 (One) Time Per Week. Take on Wednesday  Indications: Kidney Failure Syndrome, Disp: , Rfl:   •  aspirin (aspirin) 81 MG EC tablet, Take 1 tablet by mouth Daily., Disp: 60 tablet, Rfl: 5  •  DULoxetine (CYMBALTA) 20 MG capsule, Take 2 capsules by mouth Daily for 90 days., Disp: 180 capsule, Rfl: 0      Review of Systems   Constitutional: Positive for malaise/fatigue. Negative for decreased appetite.  "  HENT: Negative for hoarse voice.    Cardiovascular: Positive for dyspnea on exertion and leg swelling. Negative for chest pain and claudication.   Respiratory: Positive for cough and shortness of breath.    Endocrine: Negative for polyuria.   Hematologic/Lymphatic: Negative for bleeding problem. Bruises/bleeds easily.   Skin: Positive for color change, nail changes and poor wound healing.   Musculoskeletal: Positive for arthritis, back pain, joint pain and muscle cramps.   Gastrointestinal: Negative for abdominal pain.   Neurological: Positive for dizziness, numbness and paresthesias.   Psychiatric/Behavioral: Positive for depression.         Vitals:    12/02/22 1153   BP: 144/70   BP Location: Left arm   Patient Position: Sitting   Cuff Size: Adult   Pulse: 86   SpO2: 97%   Weight: 117 kg (257 lb)   Height: 157.5 cm (62\")     Physical Exam  Vitals and nursing note reviewed.   Constitutional:       Appearance: She is obese. She is ill-appearing.   HENT:      Head: Normocephalic.      Nose: Nose normal.      Mouth/Throat:      Mouth: Mucous membranes are moist.   Eyes:      Pupils: Pupils are equal, round, and reactive to light.   Cardiovascular:      Rate and Rhythm: Normal rate.      Pulses:           Dorsalis pedis pulses are 1+ on the right side and 1+ on the left side.        Posterior tibial pulses are 1+ on the right side and 1+ on the left side.      Comments: Right IJ tunneled catheter  Pulmonary:      Effort: Pulmonary effort is normal.   Abdominal:      General: Abdomen is flat.   Musculoskeletal:         General: Tenderness present.      Right lower leg: Edema present.      Left lower leg: Edema present.   Skin:     General: Skin is warm.      Capillary Refill: Capillary refill takes 2 to 3 seconds.      Comments: Bilateral wounds both feet covered in Kerlix Betadine with Ace.  Prior left transmit site delayed wound healing   Neurological:      Mental Status: She is alert and oriented to person, place, " and time.   Psychiatric:         Mood and Affect: Mood normal.         Assessment & Plan     Independent Review of Studies    1. PVD (peripheral vascular disease) with claudication (HCC)  Severe reduction in bilateral lower extremity perfusion.  Multiple endovascular intervention, operative notes reviewed from [facility] dissections distally that required covered stenting placement popliteal.  These are likely occluded.  Poor outflow documented on report.    Unfortunately in this case I feel that further salvage revascularization attempts would be futile as opportunity for long-term success is incredibly poor given her diabetes and continued smoking status.  Given her multiple comorbid conditions, poor outflow, and prior GSV harvesting for CABG she does not appear to be a candidate for fem-distal revascularization.     May require more proximal level of amputation of left leg in the future for definitive treatment should she not progress, will defer this podiatry.  These findings were reviewed at length with patient and Dr. Oliveira.    Will request films from arteriogram and CTA from [facility] in September of this year to review potential benefit of intervention of the right leg    DAPT, Statin    2. Mixed hyperlipidemia  Lipid-lowering therapy has been proven beneficial in patients with cardio-vascular disease. Current guidelines recommend statin treatment for all patients with PAD,CAD and carotid stenosis. Statins are beneficial in preventing cardiovascular events, increasing functional capacity and lower the risk of adverse limb loss in PAD. Statins decrease the progression of plaque formation and may improve peripheral vessel lining, and aid in reversing atherosclerosis.    3. Carotid stenosis, asymptomatic, bilateral  Prior left ICA stenting transfemoral approach back in 2014.  She is lost to follow-up we will plan to bring her back with carotid ultrasound next available.    Remains on dual antiplatelet  therapy    4. Postoperative wound dehiscence, subsequent encounter  Management per podiatry and wound care.  Dressings were removed and then replaced back in office with Curlex Betadine ABD pad for absorbency.    5. Type 2 diabetes mellitus with diabetic peripheral angiopathy with gangrene, unspecified whether long term insulin use (HCC)  Lab Results   Component Value Date    HGBA1C 10.2 (H) 09/16/2022     6. ESRD on dialysis  Hemodialysis as scheduled via tunneled catheter    Detailed discussion regarding risks, benefits, and treatment plan. Images independently reviewed. Patient understands, agrees, and wishes to proceed with plan.       This document has been electronically signed by RANDI Jovel @  On December 2, 2022 12:27 CST

## 2022-12-02 NOTE — PATIENT INSTRUCTIONS
Severe reduction to bilateral lower extremity perfusion.  Multiple interventions with short-term failure I do not see a viable option for revascularization as it relates to long-term success in wound healing.    Recommend further follow-up with Dr. Oliveira for wound care and treatment, determination whether more proximal amputation is required.    Will request images from [facility] from prior arteriogram and CTA.

## 2022-12-05 ENCOUNTER — HOME CARE VISIT (OUTPATIENT)
Dept: HOME HEALTH SERVICES | Facility: CLINIC | Age: 62
End: 2022-12-05

## 2022-12-05 PROCEDURE — G0299 HHS/HOSPICE OF RN EA 15 MIN: HCPCS

## 2022-12-06 VITALS
TEMPERATURE: 98.4 F | RESPIRATION RATE: 18 BRPM | SYSTOLIC BLOOD PRESSURE: 130 MMHG | HEART RATE: 74 BPM | OXYGEN SATURATION: 94 % | DIASTOLIC BLOOD PRESSURE: 80 MMHG

## 2022-12-09 ENCOUNTER — OUTSIDE FACILITY SERVICE (OUTPATIENT)
Dept: PODIATRY | Facility: CLINIC | Age: 62
End: 2022-12-09

## 2022-12-09 ENCOUNTER — HOME CARE VISIT (OUTPATIENT)
Dept: HOME HEALTH SERVICES | Facility: CLINIC | Age: 62
End: 2022-12-09

## 2022-12-09 ENCOUNTER — OFFICE VISIT (OUTPATIENT)
Dept: WOUND CARE | Facility: HOSPITAL | Age: 62
End: 2022-12-09

## 2022-12-09 PROCEDURE — 99212 OFFICE O/P EST SF 10 MIN: CPT | Performed by: PODIATRIST

## 2022-12-09 PROCEDURE — G0463 HOSPITAL OUTPT CLINIC VISIT: HCPCS

## 2022-12-13 ENCOUNTER — HOME CARE VISIT (OUTPATIENT)
Dept: HOME HEALTH SERVICES | Facility: CLINIC | Age: 62
End: 2022-12-13

## 2022-12-13 ENCOUNTER — OFFICE VISIT (OUTPATIENT)
Dept: ORTHOPEDIC SURGERY | Facility: CLINIC | Age: 62
End: 2022-12-13

## 2022-12-13 VITALS
OXYGEN SATURATION: 95 % | RESPIRATION RATE: 18 BRPM | DIASTOLIC BLOOD PRESSURE: 62 MMHG | SYSTOLIC BLOOD PRESSURE: 132 MMHG | TEMPERATURE: 97.8 F | HEART RATE: 72 BPM

## 2022-12-13 VITALS — WEIGHT: 257 LBS | BODY MASS INDEX: 47.29 KG/M2 | HEIGHT: 62 IN

## 2022-12-13 DIAGNOSIS — I65.23 BILATERAL CAROTID ARTERY STENOSIS: ICD-10-CM

## 2022-12-13 DIAGNOSIS — J44.9 OSA AND COPD OVERLAP SYNDROME: ICD-10-CM

## 2022-12-13 DIAGNOSIS — Z99.2 CKD (CHRONIC KIDNEY DISEASE) REQUIRING CHRONIC DIALYSIS: ICD-10-CM

## 2022-12-13 DIAGNOSIS — G47.33 OSA AND COPD OVERLAP SYNDROME: ICD-10-CM

## 2022-12-13 DIAGNOSIS — Z99.81 ON HOME OXYGEN THERAPY: ICD-10-CM

## 2022-12-13 DIAGNOSIS — L97.524 CHRONIC ULCER OF LEFT FOOT WITH NECROSIS OF BONE: Primary | ICD-10-CM

## 2022-12-13 DIAGNOSIS — I73.9 PAD (PERIPHERAL ARTERY DISEASE): ICD-10-CM

## 2022-12-13 DIAGNOSIS — I87.2 VENOUS INSUFFICIENCY: ICD-10-CM

## 2022-12-13 DIAGNOSIS — I27.20 PULMONARY HYPERTENSION: ICD-10-CM

## 2022-12-13 DIAGNOSIS — I25.10 CORONARY ARTERY DISEASE INVOLVING NATIVE CORONARY ARTERY OF NATIVE HEART WITHOUT ANGINA PECTORIS: ICD-10-CM

## 2022-12-13 DIAGNOSIS — N18.6 CKD (CHRONIC KIDNEY DISEASE) REQUIRING CHRONIC DIALYSIS: ICD-10-CM

## 2022-12-13 DIAGNOSIS — M79.672 LEFT FOOT PAIN: ICD-10-CM

## 2022-12-13 DIAGNOSIS — E66.01 MORBID OBESITY WITH BMI OF 45.0-49.9, ADULT: ICD-10-CM

## 2022-12-13 PROCEDURE — 99205 OFFICE O/P NEW HI 60 MIN: CPT | Performed by: ORTHOPAEDIC SURGERY

## 2022-12-13 PROCEDURE — G0300 HHS/HOSPICE OF LPN EA 15 MIN: HCPCS

## 2022-12-13 RX ORDER — BUPIVACAINE HCL/0.9 % NACL/PF 0.1 %
2 PLASTIC BAG, INJECTION (ML) EPIDURAL ONCE
Status: CANCELLED | OUTPATIENT
Start: 2022-12-16 | End: 2022-12-13

## 2022-12-13 NOTE — H&P (VIEW-ONLY)
Yamileth Slater is a 62 y.o. female   Primary provider:  Kiersten Wilson APRN       Chief Complaint   Patient presents with   • Left Foot - Initial Evaluation, Pain       HISTORY OF PRESENT ILLNESS:    The patient is a 62-year-old female with a long, complicated history of chronic respiratory failure on home oxygen, diabetes mellitus type 2, morbid obesity, end-stage renal disease on dialysis, coronary artery disease and worsening wounds on both feet.  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  The left foot is what has been getting worse recently.  She was recently evaluated by vascular and had very little blood flow below her left knee and therefore no further surgical intervention was warranted or recommended on her left foot.  She has a large open wound and has been doing dressing changes.  She wishes to discuss definitive treatment options for her left foot.  She reports pain all the time.  She is supposed to be limited weightbearing, however, both feet are involved which makes mobility very challenging.  She wants to be mobile and she wants to be more active.    Pain  This is a new problem. Episode onset: 2 weeks ago. Associated symptoms include chest pain, nausea, numbness and vomiting. Pertinent negatives include no chills or fever. Associated symptoms comments: Redness and swelling. She has tried NSAIDs and acetaminophen for the symptoms.        CONCURRENT MEDICAL HISTORY:    Past Medical History:   Diagnosis Date   • Acute blood loss anemia 4/16/2017    Likely due to gastric oozing at this time. - Dr. Duarte (GI) was consulted and has now signed off, will follow up outpatient - pill colonoscopy showed AVMs - continue to monitor   • Acute cystitis with hematuria 3/31/2021    1/13: IV Rocephin 1 gm q 24 1/14 : transitioned  to omnicef 300mg. Urine cultures resulted and did not show growth. Omnicef discontinued as patient  is asymptomatic   • Altered mental status 1/9/2022    - AMS on presentation - initial ABG pH 7.3, CO2 34 - Procal 0.29 - UA negative for acute cysitits -CTA head wnl  - Empiric Zosyn and Vancomycin -Lactate 2.5 on admission  - blood cultures no growth at 24 hours     • Anxiety    • CAD (coronary artery disease) 4/24/2021    S/P 3 stents 5/1/2021 for [facility]HL Continue ASA 81mg & Clopidogrel 75mg Continue Atorvastatin 40mg   • Carotid artery stenosis    • Chronic obstructive lung disease ([facility])    • CKD (chronic kidney disease) stage 4, GFR 15-29 ml/min ([facility])    • CKD (chronic kidney disease), symptom management only, stage 5 ([facility]) 10/5/2020    Results from last 7 days Lab Units 12/15/21 0548 12/14/21 1323 12/14/21 0916 CREATININE mg/dL 3.92* 3.21* 3.32*  Baseline creatinine 2-3 GFR 13-25 GFR 15 Dialysis MWF, sees Dr. Lauren Nephrology consult,, appreciate recommendations Continue Bumex 1mg bid daily Holding Bumex 2mg 4 times a week   • Colonic polyp    • Coronary arteriosclerosis    • Diabetes mellitus ([facility])    • Diabetic neuropathy ([facility])    • Ear pain, right 10/18/2021    - canal trauma due to patient scratching and DMT2 - added cortisporin ear drops   • Elevated troponin 10/12/2021    -most likely from CKD -Trending down -Neg chest pain   • Generalized abdominal pain 7/1/2022    Could be due to initiation of tube feeds vs dyspepsia vs abdominal cramps related to no PO intake due to intubation vs constipation Continue current laxative regiment  If no bowel movement by this afternoon will consider enema   • GERD (gastroesophageal reflux disease)    • GI bleed 5/13/2021    - GI will follow up outpatient - Protonix 40mg daily - Avoid medical DVT prophy and use mechanical at this time instead. - Continue to monitor - pill colonoscopy results showed AVMs   • History of transfusion    • Hypercholesterolemia    • Hyperosmolar hyperglycemic state (HHS) ([facility]) 6/25/2022    Serum glucose 605 on admission  Anion gap 16 PH 7.37 Bicarb  27.9 Continue fluids  Insulin drip with [facility] protocol  Anion gap closed around 10 AM, received one dose of Levamir subq, will stop insulin drip after 2 hrs     • Hypertension    • Hypokalemia 5/27/2022    Will replace as needed. Will be cautious in the setting of ESRD to avoid need for emergency dialysis   Monitor Qtc intervals on EKG     • Hypomagnesemia 6/27/2021    Monitor and replace   • Morbid obesity ([facility])    • Nephrolithiasis    • On mechanically assisted ventilation ([facility]) 6/26/2022    Vent management and sedation orders placed.  - [facility] intensivist group consulted for vent management appreciate recommendations  - plan to extubate today     • Peripheral vascular disease ([facility])    • Pleural effusion on right 6/26/2022    CXR on 6/30/22 read as a small upper left pulmonary edema vs early pneumonia.  Last Echo 1/2022 EF 61-65 % Continue to monitor  Procal slightly improved, CRP improved On Linezolid and meropenum      • SIRS (systemic inflammatory response syndrome) ([facility]) 1/9/2022    Admission  - WBC 17.78   -   - RR 16 - 1/10: VSS/wnl - CXR - Mild pulm edema - Blood cultures no growth at 24 hours  - Procalcitonin 0.29 - UA : glucose 1000, negative Leucocytes/nitrate - Empiric Zosyn and Vancomcyin    • Sleep apnea    • Substance abuse ([facility])    • Vitamin D deficiency        Allergies   Allergen Reactions   • Adhesive Tape Hives, Other (See Comments) and Rash   • Nsaids Hives   • Latex Other (See Comments) and Hives   • Other Itching     Bandaids, MRSA, SURECLOSE   • Wound Dressing Adhesive Hives and Rash         Current Outpatient Medications:   •  acetaminophen (Tylenol 8 Hour) 650 MG 8 hr tablet, Take 1 tablet by mouth Every 8 (Eight) Hours As Needed for Mild Pain ., Disp: 90 tablet, Rfl: 0  •  albuterol (PROVENTIL) (2.5 MG/3ML) 0.083% nebulizer solution, Inhale the contents of 1 vial by nebulization Every 4 (Four) Hours As Needed for Wheezing., Disp: 75 mL, Rfl: 3  •  albuterol sulfate  (90  Base) MCG/ACT inhaler, Inhale 2 puffs Every 4 (Four) Hours As Needed for Wheezing., Disp: 18 g, Rfl: 1  •  atorvastatin (LIPITOR) 20 MG tablet, Take 1 tablet by mouth every night at bedtime., Disp: 90 tablet, Rfl: 0  •  B Complex-C-Folic Acid (RENAL VITAMIN PO), Take 1 tablet by mouth Daily., Disp: , Rfl:   •  Blood Glucose Monitoring Suppl ([employer] Blood Glucose Meter) w/Device kit, 1 each 3 (Three) Times a Day., Disp: 1 kit, Rfl: 3  •  Capsaicin 0.035 % cream, Apply 3 g topically 3 (Three) Times a Day As Needed (ankle pain)., Disp: 42.5 g, Rfl: 2  •  Cetirizine HCl 10 MG capsule, Take 1 capsule by mouth Daily., Disp: , Rfl:   •  clopidogrel (PLAVIX) 75 MG tablet, Take 1 tablet by mouth Daily., Disp: 30 tablet, Rfl: 5  •  collagenase (Santyl) 250 UNIT/GM ointment, Apply 1 application topically to the appropriate area as directed Daily., Disp: 90 g, Rfl: 2  •  CYCLOBENZAPRINE HCL PO, Take 5 mg by mouth Daily., Disp: , Rfl:   •  Diclofenac Sodium (VOLTAREN) 1 % gel gel, Apply 4 g topically to the appropriate area as directed 4 (Four) Times a Day As Needed (Ankle pain)., Disp: 350 g, Rfl: 2  •  docusate sodium (COLACE) 100 MG capsule, Take 100 mg by mouth 2 (Two) Times a Day., Disp: , Rfl:   •  DULoxetine (CYMBALTA) 20 MG capsule, Take 20 mg by mouth Daily. Indications: Disease of the Peripheral Nerves, Disp: , Rfl:   •  Easy Touch Insulin Syringe 30G X 5/16\" 0.5 ML misc, USE AS DIRECTED WITH LEVEMIR, Disp: , Rfl:   •  EASY TOUCH PEN NEEDLES 31G X 8 MM misc, , Disp: , Rfl:   •  gabapentin (NEURONTIN) 300 MG capsule, TAKE 1 CAPSULE BY MOUTH DAILY. AND AN EXTRA PILL MONDAY/WEDNESDAY/FRIDAY - DIALYSIS DAYS, Disp: 45 capsule, Rfl: 2  •  glucose blood test strip, Use as instructed, Disp: 100 each, Rfl: 12  •  hydrOXYzine (ATARAX) 25 MG tablet, Take 25 mg by mouth Every 8 (Eight) Hours As Needed for Itching., Disp: , Rfl:   •  insulin detemir (LEVEMIR) 100 UNIT/ML injection, Inject 25 Units under the skin into the appropriate  area as directed Every Night. (Patient taking differently: Inject 24 Units under the skin into the appropriate area as directed 2 (Two) Times a Day. Indications: Type 2 Diabetes), Disp: 3 mL, Rfl: 12  •  Insulin Lispro, 1 Unit Dial, (HUMALOG) 100 UNIT/ML solution pen-injector, Inject 12 Units under the skin into the appropriate area as directed 3 (Three) Times a Day With Meals. (Patient taking differently: Inject 14 Units under the skin into the appropriate area as directed 3 (Three) Times a Day With Meals. Sliding scale:  150-199 take 4 units 200-249 take 8 units 250-299 take 12 units 300-349 take 16 units 350-400 take 20 units greater than 400, call physician), Disp: 30 mL, Rfl: 12  •  Insulin Pen Needle (NovoFine) 30G X 8 MM misc, As directed 4 times daily, Disp: 100 each, Rfl: 11  •  Insulin Pen Needle 31G X 8 MM misc, Use to inject insulin 4 (Four) Times a Day as directed., Disp: 120 each, Rfl: 12  •  ipratropium-albuterol (DUO-NEB) 0.5-2.5 mg/3 ml nebulizer, Take 3 mL by nebulization 4 (Four) Times a Day., Disp: , Rfl:   •  memantine (NAMENDA) 5 MG tablet, Take 5 mg by mouth 2 (Two) Times a Day., Disp: , Rfl:   •  montelukast (SINGULAIR) 10 MG tablet, Take 1 tablet by mouth Every Night., Disp: 90 tablet, Rfl: 0  •  nitroglycerin (NITROSTAT) 0.4 MG SL tablet, Place 0.4 mg under the tongue Every 5 (Five) Minutes As Needed for Chest Pain (x 3 doses)., Disp: , Rfl:   •  O2 (OXYGEN), Inhale 3 L/min Continuous., Disp: , Rfl:   •  ondansetron ODT (Zofran ODT) 4 MG disintegrating tablet, Place 1 tablet on the tongue Every 8 (Eight) Hours As Needed for Nausea or Vomiting., Disp: 30 tablet, Rfl: 0  •  oxyCODONE (ROXICODONE) 10 MG tablet, Take 10 mg by mouth 4 (Four) Times a Day As Needed for Moderate Pain or Severe Pain., Disp: , Rfl:   •  pantoprazole (PROTONIX) 40 MG EC tablet, Take 1 tablet by mouth Every Night., Disp: 90 tablet, Rfl: 0  •  promethazine (PHENERGAN) 25 MG tablet, Take 25 mg by mouth Every 6 (Six)  Hours As Needed for Nausea or Vomiting., Disp: , Rfl:   •  sevelamer (RENVELA) 800 MG tablet, Take 800 mg by mouth 3 (Three) Times a Day With Meals., Disp: , Rfl:   •  Vitamin D, Ergocalciferol, 40252 units capsule, Take 50,000 Units by mouth 1 (One) Time Per Week. Take on Wednesday  Indications: Kidney Failure Syndrome, Disp: , Rfl:   •  DULoxetine (CYMBALTA) 20 MG capsule, Take 2 capsules by mouth Daily for 90 days., Disp: 180 capsule, Rfl: 0  •  losartan (COZAAR) 100 MG tablet, Take 100 mg by mouth Daily., Disp: , Rfl:   •  Methoxy PEG-Epoetin Beta (MIRCERA IJ), 225 mcg Every 14 (Fourteen) Days., Disp: , Rfl:   •  torsemide (DEMADEX) 20 MG tablet, Take 20 mg by mouth 2 (Two) Times a Day., Disp: , Rfl:     Past Surgical History:   Procedure Laterality Date   • CARDIAC CATHETERIZATION N/A 7/14/2020   • CARDIAC CATHETERIZATION N/A 4/23/2021    Procedure: Left Heart Cath;  Surgeon: Melba Romo MD;  Location: [facility] CATH INVASIVE LOCATION;  Service: Cardiology;  Laterality: N/A;   • CARDIAC CATHETERIZATION N/A 4/30/2021    Procedure: Percutaneous Coronary Intervention;  Surgeon: Russell Voss MD;  Location: [facility] CATH INVASIVE LOCATION;  Service: Cardiovascular;  Laterality: N/A;   • CARDIAC CATHETERIZATION N/A 4/30/2021    Procedure: Stent NIKKI coronary;  Surgeon: Russell Voss MD;  Location: [facility] CATH INVASIVE LOCATION;  Service: Cardiovascular;  Laterality: N/A;   • CARDIAC CATHETERIZATION Left 11/13/2021    Procedure: Left Heart Cath;  Surgeon: Niall Rios MD;  Location: [facility] CATH INVASIVE LOCATION;  Service: Cardiology;  Laterality: Left;   • CAROTID STENT Left    • COLONOSCOPY     • COLONOSCOPY N/A 5/14/2021    Procedure: COLONOSCOPY;  Surgeon: Mingo Duarte MD;  Location: [facility] ENDOSCOPY;  Service: Gastroenterology;  Laterality: N/A;   • CORONARY ARTERY BYPASS GRAFT N/A 2013    CABG X 3   • CYSTOSCOPY BLADDER STONE LITHOTRIPSY Bilateral    • ENDOSCOPY N/A 4/12/2021     Procedure: ESOPHAGOGASTRODUODENOSCOPY;  Surgeon: Mingo Duarte MD;  Location: [facility] ENDOSCOPY;  Service: Gastroenterology;  Laterality: N/A;   • ENDOSCOPY N/A 5/14/2021    Procedure: ESOPHAGOGASTRODUODENOSCOPY;  Surgeon: Mingo Duarte MD;  Location: [facility] ENDOSCOPY;  Service: Gastroenterology;  Laterality: N/A;   • ENDOSCOPY N/A 1/28/2022    Procedure: ESOPHAGOGASTRODUODENOSCOPY;  Surgeon: Mingo Duarte MD;  Location: [facility] ENDOSCOPY;  Service: Gastroenterology;  Laterality: N/A;   • INTERVENTIONAL RADIOLOGY PROCEDURE N/A 10/21/2021    Procedure: tunneled central venous catheter placement;  Surgeon: Donnie Robles MD;  Location: [facility] ANGIO INVASIVE LOCATION;  Service: Interventional Radiology;  Laterality: N/A;   • INTERVENTIONAL RADIOLOGY PROCEDURE N/A 1/27/2022    Procedure: tunneled central venous catheter placement;  Surgeon: Donnie Robles MD;  Location: [facility] ANGIO INVASIVE LOCATION;  Service: Interventional Radiology;  Laterality: N/A;       Family History   Problem Relation Age of Onset   • Heart disease Mother    • Lung cancer Mother    • Heart disease Father    • Heart attack Father    • Diabetes Father    • Heart disease Half-Sister         Dad's side   • Heart disease Brother    • No Known Problems Sister    • No Known Problems Sister    • No Known Problems Sister    • No Known Problems Sister    • No Known Problems Sister    • Pancreatic cancer Half-Sister         Dad's side   • No Known Problems Brother    • No Known Problems Brother    • Heart attack Half-Brother    • Heart attack Half-Brother    • No Known Problems Maternal Grandmother    • No Known Problems Maternal Grandfather    • No Known Problems Paternal Grandmother    • No Known Problems Paternal Grandfather        Social History     Socioeconomic History   • Marital status: Legally      Spouse name: wesley dumont   Tobacco Use   • Smoking status: Former     Packs/day: 0.50     Years: 46.00      Pack years: 23.00     Types: Cigarettes     Start date: 1999     Quit date: 2/15/2022     Years since quittin.8   • Smokeless tobacco: Never   • Tobacco comments:     only smoking 5 a day - quit 2021   Substance and Sexual Activity   • Alcohol use: No   • Drug use: Not Currently     Types: LSD, Marijuana, Methamphetamines   • Sexual activity: Not Currently        Review of Systems   Constitutional: Negative for chills and fever.   Respiratory: Positive for shortness of breath and wheezing.         Chronic home oxygen   Cardiovascular: Positive for chest pain.   Gastrointestinal: Positive for nausea and vomiting.   Musculoskeletal:        Stiffness and itching   Neurological: Positive for numbness.   All other systems reviewed and are negative.      PHYSICAL EXAMINATION:       Ht 157.5 cm (62\")   Wt 117 kg (257 lb)   LMP  (LMP Unknown)   BMI 47.01 kg/m²     Physical Exam  Vitals reviewed.   Constitutional:       General: She is not in acute distress.     Appearance: She is well-developed. She is obese.   Eyes:      Conjunctiva/sclera: Conjunctivae normal.      Pupils: Pupils are equal, round, and reactive to light.   Neck:      Trachea: No tracheal deviation.   Cardiovascular:      Rate and Rhythm: Normal rate and regular rhythm.      Heart sounds: Normal heart sounds.   Pulmonary:      Breath sounds: Wheezing present.      Comments: On home oxygen  Chest:      Chest wall: No tenderness.   Abdominal:      General: Bowel sounds are normal. There is no distension.      Palpations: Abdomen is soft. There is no mass.      Tenderness: There is no abdominal tenderness.   Musculoskeletal:      Cervical back: Neck supple. No muscular tenderness.   Lymphadenopathy:      Cervical: No cervical adenopathy.   Skin:     General: Skin is warm.      Findings: No rash.   Neurological:      Mental Status: She is alert and oriented to person, place, and time.   Psychiatric:         Behavior: Behavior normal.          Thought Content: Thought content normal.         Judgment: Judgment normal.         GAIT:     []  Normal  [x]  Antalgic    Assistive device: []  None  []  Walker     []  Crutches  []  Cane     [x]  Wheelchair  []  Stretcher    Left Ankle Exam     Comments:  Left foot wound was evaluated and she has an open wound involving the entirety of the midfoot.  Metatarsal fragments are noted from the first second third and fourth metatarsals.  She has exudate around the skin edges.  She has necrotic tissue involving the anterior and posterior aspects of the skin flap.  She has diffuse erythema around the remainder of the foot and on the distal tibial region.  She does have hair growth from mid shin proximal.  She has good capillary refill mid shin and proximal.  She has erythema that extends up the shin about 6 cm or so proximal to the joint space.                  XR Foot 3+ View Bilateral    Result Date: 12/2/2022  Narrative: PROCEDURE: XR FOOT 3+ VW BILATERAL VIEWS: 3 views each INDICATION: Foot ulcer, osteomyelitis COMPARISON: MRI of right foot, 9/7/2022 FINDINGS: Right foot Examination is limited by bandage material overlying the first metatarsal. There is been interval first metatarsal phalangeal joint amputation.   - fracture: None identified. No definite destructive lesion. Mild soft tissue swelling overlies the metatarsals. Vascular calcification present. Calcaneal enthesophytes are seen.   - alignment: WNL   - misc: age-related degenerative changes Left foot Bandage material overlying the foot distally somewhat limits evaluation.   - fracture: No acute fracture identified. There is been amputation of the proximal metatarsals of the first through fifth rays. No definite destructive lesions are seen. There is a 1.0 x 0.6 cm osseous fragment medial to the medial cuneiform. Source of this fragment is not identified.   - alignment: WNL   - misc:  Soft tissue defect overlies the metatarsals stumps, again limited in  evaluation due to overlying bandage material. Vascular calcification present. Calcaneal enthesophyte at the origin of the plantar aponeurosis     Impression: Limited evaluation of both feet due to overlying bandage material. This particularly limits evaluation for small amounts of gas in soft tissues. Soft tissue tissue swelling is seen bilaterally, and there is probably a soft tissue defect overlying the remaining proximal left metatarsals. Clinical correlation needed.. No definite destructive lesions are identified. (Note:  if pain or symptoms persist beyond reasonable expectations and follow-up imaging is anticipated,  scintigraphic or cross sectional imaging (CT and/or MRI) is suggested, as is deemed clinically appropriate). Electronically signed by:  Kayla Mccoy MD  12/2/2022 11:40 AM CST Workstation: [id number]    Doppler Ankle Brachial Index Single Level CAR    Result Date: 12/5/2022  Narrative: •  Right Conclusion: The right SUNDEEP is severely reduced. Waveforms are consistent with aorto-iliac disease and tib-peroneal disease. •  Left Conclusion: The left SUNDEEP is severely reduced. Waveforms are consistent with femoral disease and tib-peroneal disease.     The T com's were reviewed from Friday, December 9.  The below knee sites medial and lateral were marked as 63 and 67 which seems to indicate a good possibility for healing.      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 (HCC)  -     Case Request; Standing  -     ceFAZolin (ANCEF) 2 g in sodium chloride 0.9 % 100 mL IVPB  -     Case Request    Left foot pain  -     Case Request; Standing  -     ceFAZolin (ANCEF) 2 g in sodium chloride 0.9 % 100 mL IVPB  -     Case Request    MIKE and COPD overlap syndrome (HCC)  -     Case Request; Standing  -     ceFAZolin (ANCEF) 2 g in sodium chloride 0.9 % 100 mL IVPB  -     Case Request    Bilateral carotid artery stenosis  -     Case Request; Standing  -      ceFAZolin (ANCEF) 2 g in sodium chloride 0.9 % 100 mL IVPB  -     Case Request    PAD (peripheral artery disease) ([facility])  -     Case Request; Standing  -     ceFAZolin (ANCEF) 2 g in sodium chloride 0.9 % 100 mL IVPB  -     Case Request    Venous insufficiency  -     Case Request; Standing  -     ceFAZolin (ANCEF) 2 g in sodium chloride 0.9 % 100 mL IVPB  -     Case Request    Pulmonary hypertension ([facility])  -     Case Request; Standing  -     ceFAZolin (ANCEF) 2 g in sodium chloride 0.9 % 100 mL IVPB  -     Case Request    Coronary artery disease involving native coronary artery of native heart without angina pectoris  -     Case Request; Standing  -     ceFAZolin (ANCEF) 2 g in sodium chloride 0.9 % 100 mL IVPB  -     Case Request    Morbid obesity with BMI of 45.0-49.9, adult ([facility])  -     Case Request; Standing  -     ceFAZolin (ANCEF) 2 g in sodium chloride 0.9 % 100 mL IVPB  -     Case Request    CKD (chronic kidney disease) requiring chronic dialysis ([facility])  -     Case Request; Standing  -     ceFAZolin (ANCEF) 2 g in sodium chloride 0.9 % 100 mL IVPB  -     Case Request    On home oxygen therapy  -     Case Request; Standing  -     ceFAZolin (ANCEF) 2 g in sodium chloride 0.9 % 100 mL IVPB  -     Case Request    Other orders  -     Follow Anesthesia Guidelines / Protocol; Future  -     Follow Anesthesia Guidelines / Protocol; Standing  -     Verify NPO Status; Standing  -     Obtain informed consent (if not collected inpatient or PAT); Standing  -     Provide instructions to patient regarding NPO status; Future          PLAN    Long discussion was held with the patient and her family member regarding further treatment options.  She has had multiple debridements involving her foot and has had multiple revascularization attempts for distal blood supply in the left leg.  All have failed and she now has a large open wound involving basically an open transmetatarsal amputation.  Wound VAC has been discontinued  previously.  She has necrotic tissue that is present and has recent vascular studies that show she essentially has no flow distal to the mid tibial region.  Patient was referred for evaluation for proximal amputation.    In this setting, she does have hair growth on her shin and does have good capillary refill involving most of the proximal two thirds of the tib-fib region.  We discussed the possibility of below-knee amputation especially in light of favorable numbers on the T com evaluation.  We did, of course, discussed that in the presence of smoking and with known peripheral vascular disease it is possible that we would not heal the below-knee amputation and have to progress to an above-knee amputation.  Patient understands this risk and agrees to pursue below-knee amputation due to improved functional outcome.    Patient is extremely high risk due to smoking, diabetes mellitus, COPD with chronic home oxygen, pulmonary hypertension, morbid obesity, and end-stage renal disease on dialysis.    The patient voiced understanding of the risks, benefits, and alternative forms of treatment that were discussed and the patient consents to proceed with surgery.  All risks, benefits and alternatives were discussed.  Risks include, but not exclusive to anesthetic complications, including death, MI, CVA, infection, bleeding DVT, fracture, residual pain and need for future surgery.    This discussion was held with the patient by Huy White MD and all questions were answered.    Plan left below-knee amputation.    Discussions were held with Dr. Oliveira in regards to obtaining the T com evaluation as well as prior wound care treatment.  Discussion also held with the hospitalist service for logistics on hospital admission and perioperative care with the logistics of all of her medical issues.  Discussion was also held with Dr. Gupta with the renal service to schedule and plan for dialysis at the hospital prior to surgical  intervention.  All are in agreement to proceed.    Over an hour was spent with the patient in face-to-face discussions and surgical risk stratification, chart review to determine the extent of medical conditions, and in discussions with the above-mentioned physicians for strategizing the logistics of her care.    Return for Post-operative eval.    Huy White MD

## 2022-12-13 NOTE — PROGRESS NOTES
Yamileth Slater is a 62 y.o. female   Primary provider:  Kiersten Wilson APRN       Chief Complaint   Patient presents with   • Left Foot - Initial Evaluation, Pain       HISTORY OF PRESENT ILLNESS:    The patient is a 62-year-old female with a long, complicated history of chronic respiratory failure on home oxygen, diabetes mellitus type 2, morbid obesity, end-stage renal disease on dialysis, coronary artery disease and worsening wounds on both feet.  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  The left foot is what has been getting worse recently.  She was recently evaluated by vascular and had very little blood flow below her left knee and therefore no further surgical intervention was warranted or recommended on her left foot.  She has a large open wound and has been doing dressing changes.  She wishes to discuss definitive treatment options for her left foot.  She reports pain all the time.  She is supposed to be limited weightbearing, however, both feet are involved which makes mobility very challenging.  She wants to be mobile and she wants to be more active.    Pain  This is a new problem. Episode onset: 2 weeks ago. Associated symptoms include chest pain, nausea, numbness and vomiting. Pertinent negatives include no chills or fever. Associated symptoms comments: Redness and swelling. She has tried NSAIDs and acetaminophen for the symptoms.        CONCURRENT MEDICAL HISTORY:    Past Medical History:   Diagnosis Date   • Acute blood loss anemia 4/16/2017    Likely due to gastric oozing at this time. - Dr. Duarte (GI) was consulted and has now signed off, will follow up outpatient - pill colonoscopy showed AVMs - continue to monitor   • Acute cystitis with hematuria 3/31/2021    1/13: IV Rocephin 1 gm q 24 1/14 : transitioned  to omnicef 300mg. Urine cultures resulted and did not show growth. Omnicef discontinued as patient  is asymptomatic   • Altered mental status 1/9/2022    - AMS on presentation - initial ABG pH 7.3, CO2 34 - Procal 0.29 - UA negative for acute cysitits -CTA head wnl  - Empiric Zosyn and Vancomycin -Lactate 2.5 on admission  - blood cultures no growth at 24 hours     • Anxiety    • CAD (coronary artery disease) 4/24/2021    S/P 3 stents 5/1/2021 for BHL Continue ASA 81mg & Clopidogrel 75mg Continue Atorvastatin 40mg   • Carotid artery stenosis    • Chronic obstructive lung disease ([facility])    • CKD (chronic kidney disease) stage 4, GFR 15-29 ml/min ([facility])    • CKD (chronic kidney disease), symptom management only, stage 5 ([facility]) 10/5/2020    Results from last 7 days Lab Units 12/15/21 0548 12/14/21 1323 12/14/21 0916 CREATININE mg/dL 3.92* 3.21* 3.32*  Baseline creatinine 2-3 GFR 13-25 GFR 15 Dialysis MWF, sees Dr. Lauren Nephrology consult,, appreciate recommendations Continue Bumex 1mg bid daily Holding Bumex 2mg 4 times a week   • Colonic polyp    • Coronary arteriosclerosis    • Diabetes mellitus ([facility])    • Diabetic neuropathy ([facility])    • Ear pain, right 10/18/2021    - canal trauma due to patient scratching and DMT2 - added cortisporin ear drops   • Elevated troponin 10/12/2021    -most likely from CKD -Trending down -Neg chest pain   • Generalized abdominal pain 7/1/2022    Could be due to initiation of tube feeds vs dyspepsia vs abdominal cramps related to no PO intake due to intubation vs constipation Continue current laxative regiment  If no bowel movement by this afternoon will consider enema   • GERD (gastroesophageal reflux disease)    • GI bleed 5/13/2021    - GI will follow up outpatient - Protonix 40mg daily - Avoid medical DVT prophy and use mechanical at this time instead. - Continue to monitor - pill colonoscopy results showed AVMs   • History of transfusion    • Hypercholesterolemia    • Hyperosmolar hyperglycemic state (HHS) ([facility]) 6/25/2022    Serum glucose 605 on admission  Anion gap 16 PH 7.37 Bicarb  27.9 Continue fluids  Insulin drip with [facility] protocol  Anion gap closed around 10 AM, received one dose of Levamir subq, will stop insulin drip after 2 hrs     • Hypertension    • Hypokalemia 5/27/2022    Will replace as needed. Will be cautious in the setting of ESRD to avoid need for emergency dialysis   Monitor Qtc intervals on EKG     • Hypomagnesemia 6/27/2021    Monitor and replace   • Morbid obesity ([facility])    • Nephrolithiasis    • On mechanically assisted ventilation ([facility]) 6/26/2022    Vent management and sedation orders placed.  - [facility] intensivist group consulted for vent management appreciate recommendations  - plan to extubate today     • Peripheral vascular disease ([facility])    • Pleural effusion on right 6/26/2022    CXR on 6/30/22 read as a small upper left pulmonary edema vs early pneumonia.  Last Echo 1/2022 EF 61-65 % Continue to monitor  Procal slightly improved, CRP improved On Linezolid and meropenum      • SIRS (systemic inflammatory response syndrome) ([facility]) 1/9/2022    Admission  - WBC 17.78   -   - RR 16 - 1/10: VSS/wnl - CXR - Mild pulm edema - Blood cultures no growth at 24 hours  - Procalcitonin 0.29 - UA : glucose 1000, negative Leucocytes/nitrate - Empiric Zosyn and Vancomcyin    • Sleep apnea    • Substance abuse ([facility])    • Vitamin D deficiency        Allergies   Allergen Reactions   • Adhesive Tape Hives, Other (See Comments) and Rash   • Nsaids Hives   • Latex Other (See Comments) and Hives   • Other Itching     Bandaids, MRSA, SURECLOSE   • Wound Dressing Adhesive Hives and Rash         Current Outpatient Medications:   •  acetaminophen (Tylenol 8 Hour) 650 MG 8 hr tablet, Take 1 tablet by mouth Every 8 (Eight) Hours As Needed for Mild Pain ., Disp: 90 tablet, Rfl: 0  •  albuterol (PROVENTIL) (2.5 MG/3ML) 0.083% nebulizer solution, Inhale the contents of 1 vial by nebulization Every 4 (Four) Hours As Needed for Wheezing., Disp: 75 mL, Rfl: 3  •  albuterol sulfate  (90  "Base) MCG/ACT inhaler, Inhale 2 puffs Every 4 (Four) Hours As Needed for Wheezing., Disp: 18 g, Rfl: 1  •  atorvastatin (LIPITOR) 20 MG tablet, Take 1 tablet by mouth every night at bedtime., Disp: 90 tablet, Rfl: 0  •  B Complex-C-Folic Acid (RENAL VITAMIN PO), Take 1 tablet by mouth Daily., Disp: , Rfl:   •  Blood Glucose Monitoring Suppl ([employer] Blood Glucose Meter) w/Device kit, 1 each 3 (Three) Times a Day., Disp: 1 kit, Rfl: 3  •  Capsaicin 0.035 % cream, Apply 3 g topically 3 (Three) Times a Day As Needed (ankle pain)., Disp: 42.5 g, Rfl: 2  •  Cetirizine HCl 10 MG capsule, Take 1 capsule by mouth Daily., Disp: , Rfl:   •  clopidogrel (PLAVIX) 75 MG tablet, Take 1 tablet by mouth Daily., Disp: 30 tablet, Rfl: 5  •  collagenase (Santyl) 250 UNIT/GM ointment, Apply 1 application topically to the appropriate area as directed Daily., Disp: 90 g, Rfl: 2  •  CYCLOBENZAPRINE HCL PO, Take 5 mg by mouth Daily., Disp: , Rfl:   •  Diclofenac Sodium (VOLTAREN) 1 % gel gel, Apply 4 g topically to the appropriate area as directed 4 (Four) Times a Day As Needed (Ankle pain)., Disp: 350 g, Rfl: 2  •  docusate sodium (COLACE) 100 MG capsule, Take 100 mg by mouth 2 (Two) Times a Day., Disp: , Rfl:   •  DULoxetine (CYMBALTA) 20 MG capsule, Take 20 mg by mouth Daily. Indications: Disease of the Peripheral Nerves, Disp: , Rfl:   •  Easy Touch Insulin Syringe 30G X 5/16\" 0.5 ML misc, USE AS DIRECTED WITH LEVEMIR, Disp: , Rfl:   •  EASY TOUCH PEN NEEDLES 31G X 8 MM misc, , Disp: , Rfl:   •  gabapentin (NEURONTIN) 300 MG capsule, TAKE 1 CAPSULE BY MOUTH DAILY. AND AN EXTRA PILL MONDAY/WEDNESDAY/FRIDAY - DIALYSIS DAYS, Disp: 45 capsule, Rfl: 2  •  glucose blood test strip, Use as instructed, Disp: 100 each, Rfl: 12  •  hydrOXYzine (ATARAX) 25 MG tablet, Take 25 mg by mouth Every 8 (Eight) Hours As Needed for Itching., Disp: , Rfl:   •  insulin detemir (LEVEMIR) 100 UNIT/ML injection, Inject 25 Units under the skin into the appropriate " area as directed Every Night. (Patient taking differently: Inject 24 Units under the skin into the appropriate area as directed 2 (Two) Times a Day. Indications: Type 2 Diabetes), Disp: 3 mL, Rfl: 12  •  Insulin Lispro, 1 Unit Dial, (HUMALOG) 100 UNIT/ML solution pen-injector, Inject 12 Units under the skin into the appropriate area as directed 3 (Three) Times a Day With Meals. (Patient taking differently: Inject 14 Units under the skin into the appropriate area as directed 3 (Three) Times a Day With Meals. Sliding scale:  150-199 take 4 units 200-249 take 8 units 250-299 take 12 units 300-349 take 16 units 350-400 take 20 units greater than 400, call physician), Disp: 30 mL, Rfl: 12  •  Insulin Pen Needle (NovoFine) 30G X 8 MM misc, As directed 4 times daily, Disp: 100 each, Rfl: 11  •  Insulin Pen Needle 31G X 8 MM misc, Use to inject insulin 4 (Four) Times a Day as directed., Disp: 120 each, Rfl: 12  •  ipratropium-albuterol (DUO-NEB) 0.5-2.5 mg/3 ml nebulizer, Take 3 mL by nebulization 4 (Four) Times a Day., Disp: , Rfl:   •  memantine (NAMENDA) 5 MG tablet, Take 5 mg by mouth 2 (Two) Times a Day., Disp: , Rfl:   •  montelukast (SINGULAIR) 10 MG tablet, Take 1 tablet by mouth Every Night., Disp: 90 tablet, Rfl: 0  •  nitroglycerin (NITROSTAT) 0.4 MG SL tablet, Place 0.4 mg under the tongue Every 5 (Five) Minutes As Needed for Chest Pain (x 3 doses)., Disp: , Rfl:   •  O2 (OXYGEN), Inhale 3 L/min Continuous., Disp: , Rfl:   •  ondansetron ODT (Zofran ODT) 4 MG disintegrating tablet, Place 1 tablet on the tongue Every 8 (Eight) Hours As Needed for Nausea or Vomiting., Disp: 30 tablet, Rfl: 0  •  oxyCODONE (ROXICODONE) 10 MG tablet, Take 10 mg by mouth 4 (Four) Times a Day As Needed for Moderate Pain or Severe Pain., Disp: , Rfl:   •  pantoprazole (PROTONIX) 40 MG EC tablet, Take 1 tablet by mouth Every Night., Disp: 90 tablet, Rfl: 0  •  promethazine (PHENERGAN) 25 MG tablet, Take 25 mg by mouth Every 6 (Six)  Hours As Needed for Nausea or Vomiting., Disp: , Rfl:   •  sevelamer (RENVELA) 800 MG tablet, Take 800 mg by mouth 3 (Three) Times a Day With Meals., Disp: , Rfl:   •  Vitamin D, Ergocalciferol, 10077 units capsule, Take 50,000 Units by mouth 1 (One) Time Per Week. Take on Wednesday  Indications: Kidney Failure Syndrome, Disp: , Rfl:   •  DULoxetine (CYMBALTA) 20 MG capsule, Take 2 capsules by mouth Daily for 90 days., Disp: 180 capsule, Rfl: 0  •  losartan (COZAAR) 100 MG tablet, Take 100 mg by mouth Daily., Disp: , Rfl:   •  Methoxy PEG-Epoetin Beta (MIRCERA IJ), 225 mcg Every 14 (Fourteen) Days., Disp: , Rfl:   •  torsemide (DEMADEX) 20 MG tablet, Take 20 mg by mouth 2 (Two) Times a Day., Disp: , Rfl:     Past Surgical History:   Procedure Laterality Date   • CARDIAC CATHETERIZATION N/A 7/14/2020   • CARDIAC CATHETERIZATION N/A 4/23/2021    Procedure: Left Heart Cath;  Surgeon: Melba Romo MD;  Location: [facility] CATH INVASIVE LOCATION;  Service: Cardiology;  Laterality: N/A;   • CARDIAC CATHETERIZATION N/A 4/30/2021    Procedure: Percutaneous Coronary Intervention;  Surgeon: Russell Voss MD;  Location: [facility] CATH INVASIVE LOCATION;  Service: Cardiovascular;  Laterality: N/A;   • CARDIAC CATHETERIZATION N/A 4/30/2021    Procedure: Stent NIKKI coronary;  Surgeon: Russell Voss MD;  Location: [facility] CATH INVASIVE LOCATION;  Service: Cardiovascular;  Laterality: N/A;   • CARDIAC CATHETERIZATION Left 11/13/2021    Procedure: Left Heart Cath;  Surgeon: Niall Rios MD;  Location: [facility] CATH INVASIVE LOCATION;  Service: Cardiology;  Laterality: Left;   • CAROTID STENT Left    • COLONOSCOPY     • COLONOSCOPY N/A 5/14/2021    Procedure: COLONOSCOPY;  Surgeon: Mingo Duarte MD;  Location: [facility] ENDOSCOPY;  Service: Gastroenterology;  Laterality: N/A;   • CORONARY ARTERY BYPASS GRAFT N/A 2013    CABG X 3   • CYSTOSCOPY BLADDER STONE LITHOTRIPSY Bilateral    • ENDOSCOPY N/A 4/12/2021     Procedure: ESOPHAGOGASTRODUODENOSCOPY;  Surgeon: Mingo Duarte MD;  Location: [facility] ENDOSCOPY;  Service: Gastroenterology;  Laterality: N/A;   • ENDOSCOPY N/A 5/14/2021    Procedure: ESOPHAGOGASTRODUODENOSCOPY;  Surgeon: Mingo Duarte MD;  Location: [facility] ENDOSCOPY;  Service: Gastroenterology;  Laterality: N/A;   • ENDOSCOPY N/A 1/28/2022    Procedure: ESOPHAGOGASTRODUODENOSCOPY;  Surgeon: Mingo Duarte MD;  Location: [facility] ENDOSCOPY;  Service: Gastroenterology;  Laterality: N/A;   • INTERVENTIONAL RADIOLOGY PROCEDURE N/A 10/21/2021    Procedure: tunneled central venous catheter placement;  Surgeon: Donnie Robles MD;  Location: [facility] ANGIO INVASIVE LOCATION;  Service: Interventional Radiology;  Laterality: N/A;   • INTERVENTIONAL RADIOLOGY PROCEDURE N/A 1/27/2022    Procedure: tunneled central venous catheter placement;  Surgeon: Donnie Robles MD;  Location: [facility] ANGIO INVASIVE LOCATION;  Service: Interventional Radiology;  Laterality: N/A;       Family History   Problem Relation Age of Onset   • Heart disease Mother    • Lung cancer Mother    • Heart disease Father    • Heart attack Father    • Diabetes Father    • Heart disease Half-Sister         Dad's side   • Heart disease Brother    • No Known Problems Sister    • No Known Problems Sister    • No Known Problems Sister    • No Known Problems Sister    • No Known Problems Sister    • Pancreatic cancer Half-Sister         Dad's side   • No Known Problems Brother    • No Known Problems Brother    • Heart attack Half-Brother    • Heart attack Half-Brother    • No Known Problems Maternal Grandmother    • No Known Problems Maternal Grandfather    • No Known Problems Paternal Grandmother    • No Known Problems Paternal Grandfather        Social History     Socioeconomic History   • Marital status: Legally      Spouse name: wesley dumont   Tobacco Use   • Smoking status: Former     Packs/day: 0.50     Years: 46.00      "Pack years: 23.00     Types: Cigarettes     Start date: 1999     Quit date: 2/15/2022     Years since quittin.8   • Smokeless tobacco: Never   • Tobacco comments:     only smoking 5 a day - quit 2021   Substance and Sexual Activity   • Alcohol use: No   • Drug use: Not Currently     Types: LSD, Marijuana, Methamphetamines   • Sexual activity: Not Currently        Review of Systems   Constitutional: Negative for chills and fever.   Respiratory: Positive for shortness of breath and wheezing.         Chronic home oxygen   Cardiovascular: Positive for chest pain.   Gastrointestinal: Positive for nausea and vomiting.   Musculoskeletal:        Stiffness and itching   Neurological: Positive for numbness.   All other systems reviewed and are negative.      PHYSICAL EXAMINATION:       Ht 157.5 cm (62\")   Wt 117 kg (257 lb)   LMP  (LMP Unknown)   BMI 47.01 kg/m²     Physical Exam  Vitals reviewed.   Constitutional:       General: She is not in acute distress.     Appearance: She is well-developed. She is obese.   Eyes:      Conjunctiva/sclera: Conjunctivae normal.      Pupils: Pupils are equal, round, and reactive to light.   Neck:      Trachea: No tracheal deviation.   Cardiovascular:      Rate and Rhythm: Normal rate and regular rhythm.      Heart sounds: Normal heart sounds.   Pulmonary:      Breath sounds: Wheezing present.      Comments: On home oxygen  Chest:      Chest wall: No tenderness.   Abdominal:      General: Bowel sounds are normal. There is no distension.      Palpations: Abdomen is soft. There is no mass.      Tenderness: There is no abdominal tenderness.   Musculoskeletal:      Cervical back: Neck supple. No muscular tenderness.   Lymphadenopathy:      Cervical: No cervical adenopathy.   Skin:     General: Skin is warm.      Findings: No rash.   Neurological:      Mental Status: She is alert and oriented to person, place, and time.   Psychiatric:         Behavior: Behavior normal.         " Thought Content: Thought content normal.         Judgment: Judgment normal.         GAIT:     []  Normal  [x]  Antalgic    Assistive device: []  None  []  Walker     []  Crutches  []  Cane     [x]  Wheelchair  []  Stretcher    Left Ankle Exam     Comments:  Left foot wound was evaluated and she has an open wound involving the entirety of the midfoot.  Metatarsal fragments are noted from the first second third and fourth metatarsals.  She has exudate around the skin edges.  She has necrotic tissue involving the anterior and posterior aspects of the skin flap.  She has diffuse erythema around the remainder of the foot and on the distal tibial region.  She does have hair growth from mid shin proximal.  She has good capillary refill mid shin and proximal.  She has erythema that extends up the shin about 6 cm or so proximal to the joint space.                  XR Foot 3+ View Bilateral    Result Date: 12/2/2022  Narrative: PROCEDURE: XR FOOT 3+ VW BILATERAL VIEWS: 3 views each INDICATION: Foot ulcer, osteomyelitis COMPARISON: MRI of right foot, 9/7/2022 FINDINGS: Right foot Examination is limited by bandage material overlying the first metatarsal. There is been interval first metatarsal phalangeal joint amputation.   - fracture: None identified. No definite destructive lesion. Mild soft tissue swelling overlies the metatarsals. Vascular calcification present. Calcaneal enthesophytes are seen.   - alignment: WNL   - misc: age-related degenerative changes Left foot Bandage material overlying the foot distally somewhat limits evaluation.   - fracture: No acute fracture identified. There is been amputation of the proximal metatarsals of the first through fifth rays. No definite destructive lesions are seen. There is a 1.0 x 0.6 cm osseous fragment medial to the medial cuneiform. Source of this fragment is not identified.   - alignment: WNL   - misc:  Soft tissue defect overlies the metatarsals stumps, again limited in  evaluation due to overlying bandage material. Vascular calcification present. Calcaneal enthesophyte at the origin of the plantar aponeurosis     Impression: Limited evaluation of both feet due to overlying bandage material. This particularly limits evaluation for small amounts of gas in soft tissues. Soft tissue tissue swelling is seen bilaterally, and there is probably a soft tissue defect overlying the remaining proximal left metatarsals. Clinical correlation needed.. No definite destructive lesions are identified. (Note:  if pain or symptoms persist beyond reasonable expectations and follow-up imaging is anticipated,  scintigraphic or cross sectional imaging (CT and/or MRI) is suggested, as is deemed clinically appropriate). Electronically signed by:  Kayla Mccoy MD  12/2/2022 11:40 AM CST Workstation: [id number]    Doppler Ankle Brachial Index Single Level CAR    Result Date: 12/5/2022  Narrative: •  Right Conclusion: The right SUNDEEP is severely reduced. Waveforms are consistent with aorto-iliac disease and tib-peroneal disease. •  Left Conclusion: The left SUNDEEP is severely reduced. Waveforms are consistent with femoral disease and tib-peroneal disease.     The T com's were reviewed from Friday, December 9.  The below knee sites medial and lateral were marked as 63 and 67 which seems to indicate a good possibility for healing.      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 (HCC)  -     Case Request; Standing  -     ceFAZolin (ANCEF) 2 g in sodium chloride 0.9 % 100 mL IVPB  -     Case Request    Left foot pain  -     Case Request; Standing  -     ceFAZolin (ANCEF) 2 g in sodium chloride 0.9 % 100 mL IVPB  -     Case Request    MIKE and COPD overlap syndrome (HCC)  -     Case Request; Standing  -     ceFAZolin (ANCEF) 2 g in sodium chloride 0.9 % 100 mL IVPB  -     Case Request    Bilateral carotid artery stenosis  -     Case Request; Standing  -      ceFAZolin (ANCEF) 2 g in sodium chloride 0.9 % 100 mL IVPB  -     Case Request    PAD (peripheral artery disease) ([facility])  -     Case Request; Standing  -     ceFAZolin (ANCEF) 2 g in sodium chloride 0.9 % 100 mL IVPB  -     Case Request    Venous insufficiency  -     Case Request; Standing  -     ceFAZolin (ANCEF) 2 g in sodium chloride 0.9 % 100 mL IVPB  -     Case Request    Pulmonary hypertension ([facility])  -     Case Request; Standing  -     ceFAZolin (ANCEF) 2 g in sodium chloride 0.9 % 100 mL IVPB  -     Case Request    Coronary artery disease involving native coronary artery of native heart without angina pectoris  -     Case Request; Standing  -     ceFAZolin (ANCEF) 2 g in sodium chloride 0.9 % 100 mL IVPB  -     Case Request    Morbid obesity with BMI of 45.0-49.9, adult ([facility])  -     Case Request; Standing  -     ceFAZolin (ANCEF) 2 g in sodium chloride 0.9 % 100 mL IVPB  -     Case Request    CKD (chronic kidney disease) requiring chronic dialysis ([facility])  -     Case Request; Standing  -     ceFAZolin (ANCEF) 2 g in sodium chloride 0.9 % 100 mL IVPB  -     Case Request    On home oxygen therapy  -     Case Request; Standing  -     ceFAZolin (ANCEF) 2 g in sodium chloride 0.9 % 100 mL IVPB  -     Case Request    Other orders  -     Follow Anesthesia Guidelines / Protocol; Future  -     Follow Anesthesia Guidelines / Protocol; Standing  -     Verify NPO Status; Standing  -     Obtain informed consent (if not collected inpatient or PAT); Standing  -     Provide instructions to patient regarding NPO status; Future          PLAN    Long discussion was held with the patient and her family member regarding further treatment options.  She has had multiple debridements involving her foot and has had multiple revascularization attempts for distal blood supply in the left leg.  All have failed and she now has a large open wound involving basically an open transmetatarsal amputation.  Wound VAC has been discontinued  previously.  She has necrotic tissue that is present and has recent vascular studies that show she essentially has no flow distal to the mid tibial region.  Patient was referred for evaluation for proximal amputation.    In this setting, she does have hair growth on her shin and does have good capillary refill involving most of the proximal two thirds of the tib-fib region.  We discussed the possibility of below-knee amputation especially in light of favorable numbers on the T com evaluation.  We did, of course, discussed that in the presence of smoking and with known peripheral vascular disease it is possible that we would not heal the below-knee amputation and have to progress to an above-knee amputation.  Patient understands this risk and agrees to pursue below-knee amputation due to improved functional outcome.    Patient is extremely high risk due to smoking, diabetes mellitus, COPD with chronic home oxygen, pulmonary hypertension, morbid obesity, and end-stage renal disease on dialysis.    The patient voiced understanding of the risks, benefits, and alternative forms of treatment that were discussed and the patient consents to proceed with surgery.  All risks, benefits and alternatives were discussed.  Risks include, but not exclusive to anesthetic complications, including death, MI, CVA, infection, bleeding DVT, fracture, residual pain and need for future surgery.    This discussion was held with the patient by Huy White MD and all questions were answered.    Plan left below-knee amputation.    Discussions were held with Dr. Oliveira in regards to obtaining the T com evaluation as well as prior wound care treatment.  Discussion also held with the hospitalist service for logistics on hospital admission and perioperative care with the logistics of all of her medical issues.  Discussion was also held with Dr. Gupta with the renal service to schedule and plan for dialysis at the hospital prior to surgical  intervention.  All are in agreement to proceed.    Over an hour was spent with the patient in face-to-face discussions and surgical risk stratification, chart review to determine the extent of medical conditions, and in discussions with the above-mentioned physicians for strategizing the logistics of her care.    Return for Post-operative eval.    Huy White MD

## 2022-12-14 ENCOUNTER — HOSPITAL ENCOUNTER (OUTPATIENT)
Facility: HOSPITAL | Age: 62
Setting detail: SURGERY ADMIT
End: 2022-12-14
Attending: ORTHOPAEDIC SURGERY | Admitting: ORTHOPAEDIC SURGERY

## 2022-12-14 PROBLEM — L97.524 CHRONIC ULCER OF LEFT FOOT WITH NECROSIS OF BONE: Status: ACTIVE | Noted: 2022-12-14

## 2022-12-14 PROBLEM — M79.672 LEFT FOOT PAIN: Status: ACTIVE | Noted: 2022-12-14

## 2022-12-14 PROBLEM — Z99.81 ON HOME OXYGEN THERAPY: Status: ACTIVE | Noted: 2022-12-14

## 2022-12-14 RX ORDER — SODIUM CHLORIDE 9 MG/ML
1000 INJECTION, SOLUTION INTRAVENOUS CONTINUOUS
Status: CANCELLED | OUTPATIENT
Start: 2022-12-16

## 2022-12-14 RX ORDER — GABAPENTIN 300 MG/1
300 CAPSULE ORAL DAILY
Status: ON HOLD | COMMUNITY
End: 2022-12-16 | Stop reason: HOSPADM

## 2022-12-15 ENCOUNTER — HOSPITAL ENCOUNTER (INPATIENT)
Facility: HOSPITAL | Age: 62
LOS: 8 days | Discharge: NURSING, REHAB OR LONG-TERM CARE | DRG: 239 | End: 2022-12-23
Attending: INTERNAL MEDICINE | Admitting: INTERNAL MEDICINE
Payer: MEDICARE

## 2022-12-15 DIAGNOSIS — Z99.81 ON HOME OXYGEN THERAPY: ICD-10-CM

## 2022-12-15 DIAGNOSIS — I25.10 CORONARY ARTERY DISEASE INVOLVING NATIVE CORONARY ARTERY OF NATIVE HEART WITHOUT ANGINA PECTORIS: ICD-10-CM

## 2022-12-15 DIAGNOSIS — E66.01 MORBID OBESITY WITH BMI OF 45.0-49.9, ADULT: ICD-10-CM

## 2022-12-15 DIAGNOSIS — M79.672 LEFT FOOT PAIN: ICD-10-CM

## 2022-12-15 DIAGNOSIS — I65.23 BILATERAL CAROTID ARTERY STENOSIS: ICD-10-CM

## 2022-12-15 DIAGNOSIS — Z99.2 CKD (CHRONIC KIDNEY DISEASE) REQUIRING CHRONIC DIALYSIS: ICD-10-CM

## 2022-12-15 DIAGNOSIS — N18.6 CKD (CHRONIC KIDNEY DISEASE) REQUIRING CHRONIC DIALYSIS: ICD-10-CM

## 2022-12-15 DIAGNOSIS — L97.524 CHRONIC ULCER OF LEFT FOOT WITH NECROSIS OF BONE: ICD-10-CM

## 2022-12-15 DIAGNOSIS — I27.20 PULMONARY HYPERTENSION: ICD-10-CM

## 2022-12-15 DIAGNOSIS — Z89.512 S/P BKA (BELOW KNEE AMPUTATION), LEFT: ICD-10-CM

## 2022-12-15 DIAGNOSIS — Z74.09 IMPAIRED MOBILITY AND ADLS: ICD-10-CM

## 2022-12-15 DIAGNOSIS — Z74.09 IMPAIRED PHYSICAL MOBILITY: ICD-10-CM

## 2022-12-15 DIAGNOSIS — J44.9 OSA AND COPD OVERLAP SYNDROME: ICD-10-CM

## 2022-12-15 DIAGNOSIS — I73.9 PAD (PERIPHERAL ARTERY DISEASE): ICD-10-CM

## 2022-12-15 DIAGNOSIS — I87.2 VENOUS INSUFFICIENCY: ICD-10-CM

## 2022-12-15 DIAGNOSIS — G47.33 OSA AND COPD OVERLAP SYNDROME: ICD-10-CM

## 2022-12-15 DIAGNOSIS — Z78.9 IMPAIRED MOBILITY AND ADLS: ICD-10-CM

## 2022-12-15 LAB
AMPHET+METHAMPHET UR QL: NEGATIVE
AMPHETAMINES UR QL: NEGATIVE
ANION GAP SERPL CALCULATED.3IONS-SCNC: 14 MMOL/L (ref 5–15)
ANION GAP SERPL CALCULATED.3IONS-SCNC: 20 MMOL/L (ref 5–15)
BARBITURATES UR QL SCN: NEGATIVE
BENZODIAZ UR QL SCN: NEGATIVE
BUN SERPL-MCNC: 75 MG/DL (ref 8–23)
BUN SERPL-MCNC: 75 MG/DL (ref 8–23)
BUN/CREAT SERPL: 11.5 (ref 7–25)
BUN/CREAT SERPL: 13.1 (ref 7–25)
BUPRENORPHINE SERPL-MCNC: NEGATIVE NG/ML
CALCIUM SPEC-SCNC: 9.3 MG/DL (ref 8.6–10.5)
CALCIUM SPEC-SCNC: 9.4 MG/DL (ref 8.6–10.5)
CANNABINOIDS SERPL QL: NEGATIVE
CHLORIDE SERPL-SCNC: 100 MMOL/L (ref 98–107)
CHLORIDE SERPL-SCNC: 99 MMOL/L (ref 98–107)
CHOLEST SERPL-MCNC: 135 MG/DL (ref 0–200)
CO2 SERPL-SCNC: 18 MMOL/L (ref 22–29)
CO2 SERPL-SCNC: 22 MMOL/L (ref 22–29)
COCAINE UR QL: NEGATIVE
CREAT SERPL-MCNC: 5.74 MG/DL (ref 0.57–1)
CREAT SERPL-MCNC: 6.5 MG/DL (ref 0.57–1)
EGFRCR SERPLBLD CKD-EPI 2021: 6.7 ML/MIN/1.73
EGFRCR SERPLBLD CKD-EPI 2021: 7.8 ML/MIN/1.73
GLUCOSE BLDC GLUCOMTR-MCNC: 109 MG/DL (ref 70–130)
GLUCOSE BLDC GLUCOMTR-MCNC: 67 MG/DL (ref 70–130)
GLUCOSE SERPL-MCNC: 121 MG/DL (ref 65–99)
GLUCOSE SERPL-MCNC: 67 MG/DL (ref 65–99)
HBA1C MFR BLD: 5.3 % (ref 4.8–5.6)
HBV SURFACE AG SERPL QL IA: NORMAL
HDLC SERPL-MCNC: 53 MG/DL (ref 40–60)
LDLC SERPL CALC-MCNC: 65 MG/DL (ref 0–100)
LDLC/HDLC SERPL: 1.22 {RATIO}
METHADONE UR QL SCN: NEGATIVE
OPIATES UR QL: NEGATIVE
OXYCODONE UR QL SCN: POSITIVE
PCP UR QL SCN: NEGATIVE
POTASSIUM SERPL-SCNC: 7.3 MMOL/L (ref 3.5–5.2)
POTASSIUM SERPL-SCNC: 8.3 MMOL/L (ref 3.5–5.2)
PROPOXYPH UR QL: NEGATIVE
SODIUM SERPL-SCNC: 135 MMOL/L (ref 136–145)
SODIUM SERPL-SCNC: 138 MMOL/L (ref 136–145)
TRICYCLICS UR QL SCN: NEGATIVE
TRIGL SERPL-MCNC: 88 MG/DL (ref 0–150)
TSH SERPL DL<=0.05 MIU/L-ACNC: 8.68 UIU/ML (ref 0.27–4.2)
VLDLC SERPL-MCNC: 17 MG/DL (ref 5–40)
WHOLE BLOOD HOLD COAG: NORMAL

## 2022-12-15 PROCEDURE — 25010000002 HEPARIN (PORCINE) PER 1000 UNITS: Performed by: HOSPITALIST

## 2022-12-15 PROCEDURE — 82306 VITAMIN D 25 HYDROXY: CPT | Performed by: HOSPITALIST

## 2022-12-15 PROCEDURE — 25010000002 ONDANSETRON PER 1 MG: Performed by: HOSPITALIST

## 2022-12-15 PROCEDURE — 80061 LIPID PANEL: CPT | Performed by: HOSPITALIST

## 2022-12-15 PROCEDURE — 80048 BASIC METABOLIC PNL TOTAL CA: CPT | Performed by: INTERNAL MEDICINE

## 2022-12-15 PROCEDURE — 25010000002 VANCOMYCIN 10 G RECONSTITUTED SOLUTION: Performed by: HOSPITALIST

## 2022-12-15 PROCEDURE — 25010000002 CEFEPIME PER 500 MG: Performed by: HOSPITALIST

## 2022-12-15 PROCEDURE — 93005 ELECTROCARDIOGRAM TRACING: CPT

## 2022-12-15 PROCEDURE — 80048 BASIC METABOLIC PNL TOTAL CA: CPT | Performed by: HOSPITALIST

## 2022-12-15 PROCEDURE — 87040 BLOOD CULTURE FOR BACTERIA: CPT | Performed by: HOSPITALIST

## 2022-12-15 PROCEDURE — 25010000002 CALCIUM GLUCONATE PER 10 ML: Performed by: INTERNAL MEDICINE

## 2022-12-15 PROCEDURE — 63710000001 INSULIN REGULAR HUMAN PER 5 UNITS: Performed by: INTERNAL MEDICINE

## 2022-12-15 PROCEDURE — 87340 HEPATITIS B SURFACE AG IA: CPT | Performed by: HOSPITALIST

## 2022-12-15 PROCEDURE — 82962 GLUCOSE BLOOD TEST: CPT

## 2022-12-15 PROCEDURE — 84443 ASSAY THYROID STIM HORMONE: CPT | Performed by: HOSPITALIST

## 2022-12-15 PROCEDURE — 83036 HEMOGLOBIN GLYCOSYLATED A1C: CPT | Performed by: HOSPITALIST

## 2022-12-15 PROCEDURE — 80306 DRUG TEST PRSMV INSTRMNT: CPT | Performed by: HOSPITALIST

## 2022-12-15 RX ORDER — IPRATROPIUM BROMIDE AND ALBUTEROL SULFATE 2.5; .5 MG/3ML; MG/3ML
3 SOLUTION RESPIRATORY (INHALATION) 4 TIMES DAILY PRN
Status: DISCONTINUED | OUTPATIENT
Start: 2022-12-15 | End: 2022-12-23 | Stop reason: HOSPADM

## 2022-12-15 RX ORDER — ALBUTEROL SULFATE 2.5 MG/3ML
2.5 SOLUTION RESPIRATORY (INHALATION) EVERY 4 HOURS PRN
Status: DISCONTINUED | OUTPATIENT
Start: 2022-12-15 | End: 2022-12-15

## 2022-12-15 RX ORDER — SODIUM CHLORIDE 9 MG/ML
1000 INJECTION, SOLUTION INTRAVENOUS CONTINUOUS
Status: DISCONTINUED | OUTPATIENT
Start: 2022-12-15 | End: 2022-12-23 | Stop reason: HOSPADM

## 2022-12-15 RX ORDER — LORAZEPAM 0.5 MG/1
0.5 TABLET ORAL EVERY 8 HOURS PRN
Status: DISCONTINUED | OUTPATIENT
Start: 2022-12-15 | End: 2022-12-23 | Stop reason: HOSPADM

## 2022-12-15 RX ORDER — SODIUM CHLORIDE 0.9 % (FLUSH) 0.9 %
10 SYRINGE (ML) INJECTION EVERY 12 HOURS SCHEDULED
Status: DISCONTINUED | OUTPATIENT
Start: 2022-12-15 | End: 2022-12-23 | Stop reason: HOSPADM

## 2022-12-15 RX ORDER — GABAPENTIN 300 MG/1
300 CAPSULE ORAL
Status: DISCONTINUED | OUTPATIENT
Start: 2022-12-15 | End: 2022-12-23 | Stop reason: HOSPADM

## 2022-12-15 RX ORDER — ONDANSETRON 4 MG/1
4 TABLET, FILM COATED ORAL EVERY 6 HOURS PRN
Status: DISCONTINUED | OUTPATIENT
Start: 2022-12-15 | End: 2022-12-23 | Stop reason: HOSPADM

## 2022-12-15 RX ORDER — HYDROXYZINE HYDROCHLORIDE 25 MG/1
25 TABLET, FILM COATED ORAL EVERY 8 HOURS PRN
Status: DISCONTINUED | OUTPATIENT
Start: 2022-12-15 | End: 2022-12-23 | Stop reason: HOSPADM

## 2022-12-15 RX ORDER — MEMANTINE HYDROCHLORIDE 5 MG/1
5 TABLET ORAL 2 TIMES DAILY
Status: DISCONTINUED | OUTPATIENT
Start: 2022-12-15 | End: 2022-12-23 | Stop reason: HOSPADM

## 2022-12-15 RX ORDER — ALBUTEROL SULFATE 2.5 MG/3ML
2.5 SOLUTION RESPIRATORY (INHALATION) EVERY 6 HOURS PRN
Status: DISCONTINUED | OUTPATIENT
Start: 2022-12-15 | End: 2022-12-15

## 2022-12-15 RX ORDER — BUPIVACAINE HCL/0.9 % NACL/PF 0.1 %
2 PLASTIC BAG, INJECTION (ML) EPIDURAL ONCE
Status: DISCONTINUED | OUTPATIENT
Start: 2022-12-15 | End: 2022-12-17

## 2022-12-15 RX ORDER — LOSARTAN POTASSIUM 50 MG/1
100 TABLET ORAL DAILY
Status: DISCONTINUED | OUTPATIENT
Start: 2022-12-16 | End: 2022-12-17

## 2022-12-15 RX ORDER — TEMAZEPAM 15 MG/1
15 CAPSULE ORAL NIGHTLY PRN
Status: DISCONTINUED | OUTPATIENT
Start: 2022-12-15 | End: 2022-12-23 | Stop reason: HOSPADM

## 2022-12-15 RX ORDER — PANTOPRAZOLE SODIUM 40 MG/1
40 TABLET, DELAYED RELEASE ORAL NIGHTLY
Status: DISCONTINUED | OUTPATIENT
Start: 2022-12-15 | End: 2022-12-23 | Stop reason: HOSPADM

## 2022-12-15 RX ORDER — OXYCODONE HYDROCHLORIDE 10 MG/1
10 TABLET ORAL EVERY 4 HOURS PRN
Status: DISCONTINUED | OUTPATIENT
Start: 2022-12-15 | End: 2022-12-23 | Stop reason: HOSPADM

## 2022-12-15 RX ORDER — INSULIN ASPART 100 [IU]/ML
0-9 INJECTION, SOLUTION INTRAVENOUS; SUBCUTANEOUS
Status: DISCONTINUED | OUTPATIENT
Start: 2022-12-15 | End: 2022-12-23 | Stop reason: HOSPADM

## 2022-12-15 RX ORDER — SODIUM CHLORIDE 0.9 % (FLUSH) 0.9 %
10 SYRINGE (ML) INJECTION AS NEEDED
Status: DISCONTINUED | OUTPATIENT
Start: 2022-12-15 | End: 2022-12-23 | Stop reason: HOSPADM

## 2022-12-15 RX ORDER — ONDANSETRON 2 MG/ML
4 INJECTION INTRAMUSCULAR; INTRAVENOUS EVERY 6 HOURS PRN
Status: DISCONTINUED | OUTPATIENT
Start: 2022-12-15 | End: 2022-12-23 | Stop reason: HOSPADM

## 2022-12-15 RX ORDER — DEXTROSE MONOHYDRATE 25 G/50ML
50 INJECTION, SOLUTION INTRAVENOUS ONCE
Status: COMPLETED | OUTPATIENT
Start: 2022-12-15 | End: 2022-12-15

## 2022-12-15 RX ORDER — GABAPENTIN 300 MG/1
300 CAPSULE ORAL
Status: DISCONTINUED | OUTPATIENT
Start: 2022-12-16 | End: 2022-12-23 | Stop reason: HOSPADM

## 2022-12-15 RX ORDER — DEXTROSE MONOHYDRATE 25 G/50ML
50 INJECTION, SOLUTION INTRAVENOUS ONCE
Status: COMPLETED | OUTPATIENT
Start: 2022-12-16 | End: 2022-12-16

## 2022-12-15 RX ORDER — SODIUM CHLORIDE 9 MG/ML
40 INJECTION, SOLUTION INTRAVENOUS AS NEEDED
Status: DISCONTINUED | OUTPATIENT
Start: 2022-12-15 | End: 2022-12-23 | Stop reason: HOSPADM

## 2022-12-15 RX ORDER — ACETAMINOPHEN 325 MG/1
650 TABLET ORAL EVERY 4 HOURS PRN
Status: DISCONTINUED | OUTPATIENT
Start: 2022-12-15 | End: 2022-12-23 | Stop reason: HOSPADM

## 2022-12-15 RX ORDER — GABAPENTIN 300 MG/1
300 CAPSULE ORAL DAILY
Status: DISCONTINUED | OUTPATIENT
Start: 2022-12-15 | End: 2022-12-15

## 2022-12-15 RX ORDER — CALCIUM CARBONATE 200(500)MG
2 TABLET,CHEWABLE ORAL 2 TIMES DAILY PRN
Status: DISCONTINUED | OUTPATIENT
Start: 2022-12-15 | End: 2022-12-23 | Stop reason: HOSPADM

## 2022-12-15 RX ORDER — SEVELAMER CARBONATE 800 MG/1
800 TABLET, FILM COATED ORAL
Status: DISCONTINUED | OUTPATIENT
Start: 2022-12-15 | End: 2022-12-23 | Stop reason: HOSPADM

## 2022-12-15 RX ORDER — HEPARIN SODIUM 5000 [USP'U]/ML
5000 INJECTION, SOLUTION INTRAVENOUS; SUBCUTANEOUS EVERY 8 HOURS SCHEDULED
Status: DISCONTINUED | OUTPATIENT
Start: 2022-12-15 | End: 2022-12-16

## 2022-12-15 RX ORDER — METRONIDAZOLE 500 MG/100ML
500 INJECTION, SOLUTION INTRAVENOUS EVERY 8 HOURS
Status: DISCONTINUED | OUTPATIENT
Start: 2022-12-15 | End: 2022-12-20

## 2022-12-15 RX ORDER — NICOTINE POLACRILEX 4 MG
15 LOZENGE BUCCAL
Status: DISCONTINUED | OUTPATIENT
Start: 2022-12-15 | End: 2022-12-23 | Stop reason: HOSPADM

## 2022-12-15 RX ORDER — ATORVASTATIN CALCIUM 20 MG/1
20 TABLET, FILM COATED ORAL NIGHTLY
Status: DISCONTINUED | OUTPATIENT
Start: 2022-12-15 | End: 2022-12-23 | Stop reason: HOSPADM

## 2022-12-15 RX ORDER — DEXTROSE MONOHYDRATE 25 G/50ML
25 INJECTION, SOLUTION INTRAVENOUS
Status: DISCONTINUED | OUTPATIENT
Start: 2022-12-15 | End: 2022-12-23 | Stop reason: HOSPADM

## 2022-12-15 RX ORDER — INSULIN LISPRO 100 [IU]/ML
14 INJECTION, SOLUTION INTRAVENOUS; SUBCUTANEOUS
COMMUNITY
End: 2022-12-23 | Stop reason: HOSPADM

## 2022-12-15 RX ORDER — DULOXETIN HYDROCHLORIDE 20 MG/1
20 CAPSULE, DELAYED RELEASE ORAL DAILY
Status: DISCONTINUED | OUTPATIENT
Start: 2022-12-16 | End: 2022-12-23 | Stop reason: HOSPADM

## 2022-12-15 RX ADMIN — SODIUM ZIRCONIUM CYCLOSILICATE 5 G: 5 POWDER, FOR SUSPENSION ORAL at 20:24

## 2022-12-15 RX ADMIN — ATORVASTATIN CALCIUM 20 MG: 20 TABLET, FILM COATED ORAL at 20:24

## 2022-12-15 RX ADMIN — HEPARIN SODIUM 5000 UNITS: 5000 INJECTION INTRAVENOUS; SUBCUTANEOUS at 22:40

## 2022-12-15 RX ADMIN — ONDANSETRON 4 MG: 2 INJECTION INTRAMUSCULAR; INTRAVENOUS at 18:28

## 2022-12-15 RX ADMIN — SEVELAMER CARBONATE 800 MG: 800 TABLET, FILM COATED ORAL at 18:26

## 2022-12-15 RX ADMIN — Medication 10 ML: at 22:51

## 2022-12-15 RX ADMIN — MEMANTINE 5 MG: 5 TABLET ORAL at 20:24

## 2022-12-15 RX ADMIN — Medication 2 G: at 18:26

## 2022-12-15 RX ADMIN — SODIUM BICARBONATE 50 MEQ: 84 INJECTION INTRAVENOUS at 21:03

## 2022-12-15 RX ADMIN — METRONIDAZOLE 500 MG: 500 INJECTION, SOLUTION INTRAVENOUS at 22:51

## 2022-12-15 RX ADMIN — DEXTROSE MONOHYDRATE 50 ML: 25 INJECTION, SOLUTION INTRAVENOUS at 20:46

## 2022-12-15 RX ADMIN — VANCOMYCIN HYDROCHLORIDE 2500 MG: 10 INJECTION, POWDER, LYOPHILIZED, FOR SOLUTION INTRAVENOUS at 20:24

## 2022-12-15 RX ADMIN — CALCIUM GLUCONATE 1 G: 98 INJECTION, SOLUTION INTRAVENOUS at 20:46

## 2022-12-15 RX ADMIN — GABAPENTIN 300 MG: 300 CAPSULE ORAL at 22:46

## 2022-12-15 RX ADMIN — PANTOPRAZOLE SODIUM 40 MG: 40 TABLET, DELAYED RELEASE ORAL at 20:24

## 2022-12-15 RX ADMIN — INSULIN HUMAN 5 UNITS: 100 INJECTION, SOLUTION PARENTERAL at 20:47

## 2022-12-15 RX ADMIN — SODIUM BICARBONATE 50 MEQ: 84 INJECTION INTRAVENOUS at 20:45

## 2022-12-15 NOTE — H&P
[facility] Medicine Admission      Date of Admission: 12/15/2022      Primary Care Physician: Kiersten Wilson APRN      Chief Complaint: Left foot pain secondary to necrosis    HPI:  The patient she is a 62-year-old female admitted to the hospital direct admission from orthopedic surgery.  The patient has a longstanding history of diabetic neuropathy and had trauma to her left foot which resulted in infection and ultimately gangrene.  She has had a transmetatarsal amputation completed which continued to worsen and gangrene once again set in and now she is being admitted to the hospital for below the knee amputation.  The patient states that this pain is controlled with oxycodone.  She has had some nausea vomiting.  No fevers or chills.  No headache no chest pain no worsening of shortness of breath.  She has a history of end-stage renal disease Dr. Gupta is her nephrologist and has been consulted to manage patient's hemodialysis which is to be completed on Monday Wednesday and Friday.  The patient will be scheduled for surgery tomorrow afternoon after dialysis has been completed.  Case has been discussed with Dr. White from orthopedic surgery and Dr. Gupta from nephrology.      Concurrent Medical History:  has a past medical history of Acute blood loss anemia (04/16/2017), Acute cystitis with hematuria (03/31/2021), Altered mental status (01/09/2022), Anxiety, CAD (coronary artery disease) (04/24/2021), Carotid artery stenosis, CHF (congestive heart failure) ([facility]), Chronic obstructive lung disease ([facility]), CKD (chronic kidney disease) stage 4, GFR 15-29 ml/min ([facility]), CKD (chronic kidney disease), symptom management only, stage 5 ([facility]) (10/05/2020), Colonic polyp, Coronary arteriosclerosis, Diabetes mellitus ([facility]), Diabetic neuropathy ([facility]), Ear pain, right (10/18/2021), Elevated troponin (10/12/2021), Generalized abdominal pain (07/01/2022), GERD (gastroesophageal  reflux disease), GI bleed (05/13/2021), History of transfusion, Hypercholesterolemia, Hyperosmolar hyperglycemic state (HHS) ([facility]) (06/25/2022), Hypertension, Hypokalemia (05/27/2022), Hypomagnesemia (06/27/2021), Morbid obesity ([facility]), Nephrolithiasis, On mechanically assisted ventilation ([facility]) (06/26/2022), Peripheral vascular disease ([facility]), Pleural effusion on right (06/26/2022), PVD (peripheral vascular disease) ([facility]), SIRS (systemic inflammatory response syndrome) ([facility]) (01/09/2022), Sleep apnea, Substance abuse ([facility]), and Vitamin D deficiency.    Past Surgical History:  has a past surgical history that includes Colonoscopy; Carotid stent (Left); Cardiac catheterization (N/A, 07/14/2020); cystoscopy bladder stone lithotripsy (Bilateral); Esophagogastroduodenoscopy (N/A, 04/12/2021); Cardiac catheterization (N/A, 04/23/2021); Cardiac catheterization (N/A, 04/30/2021); Cardiac catheterization (N/A, 04/30/2021); Esophagogastroduodenoscopy (N/A, 05/14/2021); Colonoscopy (N/A, 05/14/2021); Coronary artery bypass graft (N/A, 2013); Interventional radiology procedure (N/A, 10/21/2021); Cardiac catheterization (Left, 11/13/2021); Interventional radiology procedure (N/A, 01/27/2022); Esophagogastroduodenoscopy (N/A, 01/28/2022); Hysterectomy; Laparoscopic tubal ligation; and Tunneled venous catheter placement.    Family History: family history includes Diabetes in her father; Heart attack in her father, half-brother, and half-brother; Heart disease in her brother, father, half-sister, and mother; Lung cancer in her mother; No Known Problems in her brother, brother, maternal grandfather, maternal grandmother, paternal grandfather, paternal grandmother, sister, sister, sister, sister, and sister; Pancreatic cancer in her half-sister.     Social History:  reports that she has been smoking cigarettes. She started smoking about 23 years ago. She has a 23.00 pack-year smoking history. She has never used smokeless tobacco. She  reports that she does not currently use drugs after having used the following drugs: LSD, Marijuana, and Methamphetamines. She reports that she does not drink alcohol.    Allergies:   Allergies   Allergen Reactions   • Adhesive Tape Hives, Other (See Comments) and Rash   • Nsaids Hives   • Latex Other (See Comments) and Hives   • Other Itching     Bandaids, MRSA, SURECLOSE   • Wound Dressing Adhesive Hives and Rash       Medications:   Prior to Admission medications    Medication Sig Start Date End Date Taking? Authorizing Provider   albuterol (PROVENTIL) (2.5 MG/3ML) 0.083% nebulizer solution Inhale the contents of 1 vial by nebulization Every 4 (Four) Hours As Needed for Wheezing. 10/28/21  Yes Haily Mcneil MD   albuterol sulfate  (90 Base) MCG/ACT inhaler Inhale 2 puffs Every 4 (Four) Hours As Needed for Wheezing. 3/26/21  Yes Nirmal Calvillo MD   atorvastatin (LIPITOR) 20 MG tablet Take 1 tablet by mouth every night at bedtime. 4/26/22  Yes Kit Brar MD   Cetirizine HCl 10 MG capsule Take 1 capsule by mouth Daily. 11/4/21  Yes Caio Thompson MD   clopidogrel (PLAVIX) 75 MG tablet Take 1 tablet by mouth Daily. 3/26/21  Yes Nirmal Calvillo MD   CYCLOBENZAPRINE HCL PO Take 5 mg by mouth 2 (Two) Times a Day. 10/28/22  Yes Kristie Kern DPM   docusate sodium (COLACE) 100 MG capsule Take 100 mg by mouth 2 (Two) Times a Day. 1/1/21  Yes Caio Thompson MD   acetaminophen (Tylenol 8 Hour) 650 MG 8 hr tablet Take 1 tablet by mouth Every 8 (Eight) Hours As Needed for Mild Pain . 2/1/22   Blake Burris MD   Blood Glucose Monitoring Suppl ([employer] Blood Glucose Meter) w/Device kit 1 each 3 (Three) Times a Day. 10/9/20   Nirmal Calvillo MD   Capsaicin 0.035 % cream Apply 3 g topically 3 (Three) Times a Day As Needed (ankle pain). 4/26/22   Kit Brar MD   Diclofenac Sodium (VOLTAREN) 1 % gel gel Apply 4 g topically to the appropriate area as directed  4 (Four) Times a Day As Needed (Ankle pain). 4/26/22   Kit Brar MD   DULoxetine (CYMBALTA) 20 MG capsule Take 20 mg by mouth Daily. Indications: Disease of the Peripheral Nerves 1/1/21   Caio Thompson MD   Easy Touch Insulin Syringe 30G X 5/16\" 0.5 ML misc USE AS DIRECTED WITH LEVEMIR 12/1/21   Caio Thompson MD   EASY TOUCH PEN NEEDLES 31G X 8 MM misc  8/7/20   Caio Thompson MD   gabapentin (NEURONTIN) 300 MG capsule Take 300 mg by mouth Daily. Repeat x 1 on M_W_F (dialysis days)    Caio Thompson MD   glucose blood test strip Use as instructed 10/9/20   Nirmal Calvillo MD   hydrOXYzine (ATARAX) 25 MG tablet Take 25 mg by mouth Every 8 (Eight) Hours As Needed for Itching. 1/1/21   Caio Thompson MD   insulin detemir (LEVEMIR) 100 UNIT/ML injection Inject 24 Units under the skin into the appropriate area as directed 2 (Two) Times a Day.    Caio Thompson MD   Insulin Lispro (humaLOG) 100 UNIT/ML injection Inject 14 Units under the skin into the appropriate area as directed 3 (Three) Times a Day Before Meals. In addition to sliding scale    Caio Thompson MD   Insulin Lispro, 1 Unit Dial, (HUMALOG) 100 UNIT/ML solution pen-injector Inject 12 Units under the skin into the appropriate area as directed 3 (Three) Times a Day With Meals.  Patient taking differently: Inject  under the skin into the appropriate area as directed 3 (Three) Times a Day With Meals. Takes 14 units with meals plus sliding scale  Sliding scale:   150-199 take 4 units  200-249 take 8 units  250-299 take 12 units  300-349 take 16 units  350-400 take 20 units  greater than 400, call physician 5/24/22   Blaek Burris MD   Insulin Pen Needle (NovoFine) 30G X 8 MM misc As directed 4 times daily 3/26/21   Nirmal Calvillo MD   Insulin Pen Needle 31G X 8 MM misc Use to inject insulin 4 (Four) Times a Day as directed. 10/28/21   Haily Mcneil MD   ipratropium-albuterol  (DUO-NEB) 0.5-2.5 mg/3 ml nebulizer Take 3 mL by nebulization 4 (Four) Times a Day As Needed. 3/22/17   Caio Thompson MD   losartan (COZAAR) 100 MG tablet Take 100 mg by mouth Daily. 10/28/22   Marissa Kernin, DPM   memantine (NAMENDA) 5 MG tablet Take 5 mg by mouth 2 (Two) Times a Day. 10/28/22   ConchaKristie lorenzo, DPM   Methoxy PEG-Epoetin Beta (MIRCERA IJ) 225 mcg Every 14 (Fourteen) Days. 3/14/22 3/13/23  Caio Thompson MD   montelukast (SINGULAIR) 10 MG tablet Take 1 tablet by mouth Every Night. 4/26/22   Kit Brar MD   nitroglycerin (NITROSTAT) 0.4 MG SL tablet Place 0.4 mg under the tongue Every 5 (Five) Minutes As Needed for Chest Pain (x 3 doses). 1/1/15   Caio Thompson MD   O2 (OXYGEN) Inhale 2 L/min Continuous. 1/1/21   Caio Thompson MD   ondansetron ODT (Zofran ODT) 4 MG disintegrating tablet Place 1 tablet on the tongue Every 8 (Eight) Hours As Needed for Nausea or Vomiting. 3/26/21   Nirmal Calvillo MD   oxyCODONE (ROXICODONE) 10 MG tablet Take 10 mg by mouth 4 (Four) Times a Day As Needed for Moderate Pain or Severe Pain. 11/4/22   Caio Thompson MD   pantoprazole (PROTONIX) 40 MG EC tablet Take 1 tablet by mouth Every Night. 4/26/22   Kit Brar MD   promethazine (PHENERGAN) 25 MG tablet Take 25 mg by mouth Every 6 (Six) Hours As Needed for Nausea or Vomiting. 11/4/22   Caio Thompson MD   sevelamer (RENVELA) 800 MG tablet Take 800 mg by mouth 3 (Three) Times a Day With Meals. 1/26/22   Caio Thompson MD   Vitamin D, Ergocalciferol, 62438 units capsule Take 50,000 Units by mouth 1 (One) Time Per Week. Take on Wednesday  Indications: Kidney Failure Syndrome 1/1/21   Caio Thompson MD   insulin aspart (novoLOG FLEXPEN) 100 UNIT/ML solution pen-injector sc pen Inject 30 Units under the skin into the appropriate area as directed 3 (Three) Times a Day With Meals. 2/1/22 3/17/22  Blake Burris MD       Review of  Systems:  Review of Systems   Constitutional: Positive for fatigue. Negative for chills and fever.   HENT: Negative.  Negative for congestion.    Eyes: Negative.    Respiratory: Negative.  Negative for cough and shortness of breath.    Cardiovascular: Negative.  Negative for chest pain and palpitations.   Gastrointestinal: Positive for nausea and vomiting. Negative for abdominal distention and abdominal pain.   Endocrine: Negative.    Genitourinary: Negative.    Musculoskeletal: Negative.         Left foot pain   Skin: Positive for wound.   Neurological: Negative.  Negative for dizziness and weakness.   Hematological: Negative.    Psychiatric/Behavioral: Negative.       Otherwise complete ROS is negative except as mentioned above.    Physical Exam:   Temp:  [96.3 °F (35.7 °C)] 96.3 °F (35.7 °C)  Heart Rate:  [72] 72  Resp:  [20] 20  BP: (157)/(73) 157/73  Physical Exam  Vitals and nursing note reviewed.   Constitutional:       Appearance: She is obese.   HENT:      Head: Normocephalic and atraumatic.      Right Ear: External ear normal.      Left Ear: External ear normal.      Nose: Nose normal.      Mouth/Throat:      Mouth: Mucous membranes are moist.      Pharynx: Oropharynx is clear.   Eyes:      General: No scleral icterus.     Extraocular Movements: Extraocular movements intact.      Conjunctiva/sclera: Conjunctivae normal.      Pupils: Pupils are equal, round, and reactive to light.   Cardiovascular:      Rate and Rhythm: Normal rate and regular rhythm.      Heart sounds: No murmur heard.  Pulmonary:      Effort: Pulmonary effort is normal.      Breath sounds: Normal breath sounds.   Abdominal:      General: Bowel sounds are normal.      Palpations: Abdomen is soft.   Musculoskeletal:         General: Normal range of motion.      Cervical back: Normal range of motion and neck supple.      Left lower leg: Edema present.   Skin:     General: Skin is warm and dry.      Capillary Refill: Capillary refill takes  less than 2 seconds.      Comments: Left foot bandage clean dry intact.  Right foot bandage clean dry intact.  Left foot is noted with transmetatarsal amputation.   Neurological:      General: No focal deficit present.      Mental Status: She is alert and oriented to person, place, and time.   Psychiatric:         Mood and Affect: Mood normal.         Behavior: Behavior normal.           Results Reviewed:  I have personally reviewed current lab, radiology, and data and agree with results.  Lab Results (last 24 hours)     Procedure Component Value Units Date/Time    Urine Drug Screen - Urine, Clean Catch [[id number]] Collected: 12/15/22 1721    Specimen: Urine, Clean Catch Updated: 12/15/22 1722    Hepatitis B Surface Antigen [[id number]] Collected: 12/15/22 1658    Specimen: Blood Updated: 12/15/22 1719    Lipid Panel [[id number]] Collected: 12/15/22 1658    Specimen: Blood Updated: 12/15/22 1719    Basic Metabolic Panel [[id number]] Collected: 12/15/22 1658    Specimen: Blood Updated: 12/15/22 1719    Hemoglobin A1c [[id number]] Collected: 12/15/22 1657    Specimen: Blood Updated: 12/15/22 1719    Vitamin D,25-Hydroxy [[id number]] Collected: 12/15/22 1658    Specimen: Blood Updated: 12/15/22 1719    POC Glucose Once [[id number]]  (Abnormal) Collected: 12/15/22 1657    Specimen: Blood Updated: 12/15/22 1714     Glucose 67 mg/dL      Comment: : [id number] ASCENCIONJONATHAN ANGELITAMeter ID: [id number]           Imaging Results (Last 24 Hours)     ** No results found for the last 24 hours. **            Assessment:    Active Hospital Problems    Diagnosis    • **Venous insufficiency          Impression/plan  Left foot necrosis.  Place the patient on IV antibiotics.  Cefepime, vancomycin pharmacy to dose, and Flagyl.  Obtain blood cultures.  Continue follow labs.  CBC complete metabolic profile tonight and in a.m.  Check chest x-ray and EKG.  Pharmacy to dose vancomycin.  Consult Dr. White for surgical intervention.  Below-knee  amputation.    End-stage renal disease.  Consult Dr. Gupta for arrangements for dialysis which is usually completed Monday Wednesday and Friday.    Insulin-dependent diabetes mellitus type 2.  We will check A1c.  Check TSH.  Place the patient on sliding scale and ADA diet n.p.o. after midnight.    Hyperlipidemia continue Lipitor.    Diabetic neuropathy continue Cymbalta and gabapentin    Hypertension continue losartan    Chronic pain continue oxycodone    GERD continue Protonix    Morbid obesity BMI is 47.  Continue nutritional support.    DVT prophylaxis Heparin    CODE STATUS full code      I confirmed that the patient's Advance Care Plan is present, code status is documented, or surrogate decision maker is listed in the patient's medical record.     I have utilized all available immediate resources to obtain, update, or review the patient's current medications.     I discussed the patient's findings and my recommendations with: The patient and her sister in detail.  They agree with current care plan.    Juvenal Roberson, DO

## 2022-12-16 ENCOUNTER — ANESTHESIA (OUTPATIENT)
Dept: PERIOP | Facility: HOSPITAL | Age: 62
DRG: 239 | End: 2022-12-16
Payer: MEDICARE

## 2022-12-16 ENCOUNTER — APPOINTMENT (OUTPATIENT)
Dept: GENERAL RADIOLOGY | Facility: HOSPITAL | Age: 62
DRG: 239 | End: 2022-12-16
Payer: MEDICARE

## 2022-12-16 ENCOUNTER — ANESTHESIA EVENT (OUTPATIENT)
Dept: PERIOP | Facility: HOSPITAL | Age: 62
DRG: 239 | End: 2022-12-16
Payer: MEDICARE

## 2022-12-16 ENCOUNTER — HOME CARE VISIT (OUTPATIENT)
Dept: HOME HEALTH SERVICES | Facility: HOME HEALTHCARE | Age: 62
End: 2022-12-16

## 2022-12-16 LAB
25(OH)D3 SERPL-MCNC: 37.6 NG/ML (ref 30–100)
ALBUMIN SERPL-MCNC: 3.1 G/DL (ref 3.5–5.2)
ALBUMIN/GLOB SERPL: 0.7 G/DL
ALP SERPL-CCNC: 297 U/L (ref 39–117)
ALT SERPL W P-5'-P-CCNC: 16 U/L (ref 1–33)
ANION GAP SERPL CALCULATED.3IONS-SCNC: 14 MMOL/L (ref 5–15)
AST SERPL-CCNC: 23 U/L (ref 1–32)
BACTERIA UR QL AUTO: ABNORMAL /HPF
BILIRUB SERPL-MCNC: 0.4 MG/DL (ref 0–1.2)
BILIRUB UR QL STRIP: NEGATIVE
BUN SERPL-MCNC: 72 MG/DL (ref 8–23)
BUN/CREAT SERPL: 11.5 (ref 7–25)
CALCIUM SPEC-SCNC: 9.2 MG/DL (ref 8.6–10.5)
CHLORIDE SERPL-SCNC: 99 MMOL/L (ref 98–107)
CLARITY UR: ABNORMAL
CO2 SERPL-SCNC: 22 MMOL/L (ref 22–29)
COLOR UR: YELLOW
CREAT SERPL-MCNC: 6.27 MG/DL (ref 0.57–1)
DEPRECATED RDW RBC AUTO: 54 FL (ref 37–54)
EGFRCR SERPLBLD CKD-EPI 2021: 7 ML/MIN/1.73
EOSINOPHIL # BLD MANUAL: 0.37 10*3/MM3 (ref 0–0.4)
EOSINOPHIL NFR BLD MANUAL: 4 % (ref 0.3–6.2)
ERYTHROCYTE [DISTWIDTH] IN BLOOD BY AUTOMATED COUNT: 16.9 % (ref 12.3–15.4)
GLOBULIN UR ELPH-MCNC: 4.7 GM/DL
GLUCOSE BLDC GLUCOMTR-MCNC: 109 MG/DL (ref 70–130)
GLUCOSE BLDC GLUCOMTR-MCNC: 149 MG/DL (ref 70–130)
GLUCOSE BLDC GLUCOMTR-MCNC: 162 MG/DL (ref 70–130)
GLUCOSE BLDC GLUCOMTR-MCNC: 91 MG/DL (ref 70–130)
GLUCOSE SERPL-MCNC: 100 MG/DL (ref 65–99)
GLUCOSE UR STRIP-MCNC: NEGATIVE MG/DL
HCT VFR BLD AUTO: 33 % (ref 34–46.6)
HGB BLD-MCNC: 10.5 G/DL (ref 12–15.9)
HGB UR QL STRIP.AUTO: ABNORMAL
HYALINE CASTS UR QL AUTO: ABNORMAL /LPF
KETONES UR QL STRIP: NEGATIVE
LEUKOCYTE ESTERASE UR QL STRIP.AUTO: ABNORMAL
LYMPHOCYTES # BLD MANUAL: 2.32 10*3/MM3 (ref 0.7–3.1)
LYMPHOCYTES NFR BLD MANUAL: 7 % (ref 5–12)
MAGNESIUM SERPL-MCNC: 1.9 MG/DL (ref 1.6–2.4)
MCH RBC QN AUTO: 27.8 PG (ref 26.6–33)
MCHC RBC AUTO-ENTMCNC: 31.8 G/DL (ref 31.5–35.7)
MCV RBC AUTO: 87.3 FL (ref 79–97)
MONOCYTES # BLD: 0.65 10*3/MM3 (ref 0.1–0.9)
NEUTROPHILS # BLD AUTO: 5.93 10*3/MM3 (ref 1.7–7)
NEUTROPHILS NFR BLD MANUAL: 64 % (ref 42.7–76)
NITRITE UR QL STRIP: NEGATIVE
PH UR STRIP.AUTO: 8 [PH] (ref 5–9)
PHOSPHATE SERPL-MCNC: 8.9 MG/DL (ref 2.5–4.5)
PLATELET # BLD AUTO: 213 10*3/MM3 (ref 140–450)
PMV BLD AUTO: 8.7 FL (ref 6–12)
POTASSIUM SERPL-SCNC: 5 MMOL/L (ref 3.5–5.2)
POTASSIUM SERPL-SCNC: 7.7 MMOL/L (ref 3.5–5.2)
PROT SERPL-MCNC: 7.8 G/DL (ref 6–8.5)
PROT UR QL STRIP: ABNORMAL
QT INTERVAL: 420 MS
QT INTERVAL: 440 MS
QTC INTERVAL: 469 MS
QTC INTERVAL: 471 MS
RBC # BLD AUTO: 3.78 10*6/MM3 (ref 3.77–5.28)
RBC # UR STRIP: ABNORMAL /HPF
RBC MORPH BLD: NORMAL
REF LAB TEST METHOD: ABNORMAL
SMALL PLATELETS BLD QL SMEAR: ADEQUATE
SODIUM SERPL-SCNC: 135 MMOL/L (ref 136–145)
SP GR UR STRIP: 1.01 (ref 1–1.03)
SQUAMOUS #/AREA URNS HPF: ABNORMAL /HPF
UROBILINOGEN UR QL STRIP: ABNORMAL
VANCOMYCIN SERPL-MCNC: 22.2 MCG/ML (ref 5–40)
VARIANT LYMPHS NFR BLD MANUAL: 19 % (ref 19.6–45.3)
VARIANT LYMPHS NFR BLD MANUAL: 6 % (ref 0–5)
WBC # UR STRIP: ABNORMAL /HPF
WBC MORPH BLD: NORMAL
WBC NRBC COR # BLD: 9.27 10*3/MM3 (ref 3.4–10.8)
WHOLE BLOOD HOLD SPECIMEN: NORMAL

## 2022-12-16 PROCEDURE — 82962 GLUCOSE BLOOD TEST: CPT

## 2022-12-16 PROCEDURE — 25010000002 HEPARIN (PORCINE) PER 1000 UNITS: Performed by: HOSPITALIST

## 2022-12-16 PROCEDURE — 99232 SBSQ HOSP IP/OBS MODERATE 35: CPT | Performed by: ORTHOPAEDIC SURGERY

## 2022-12-16 PROCEDURE — 25010000002 ONDANSETRON PER 1 MG

## 2022-12-16 PROCEDURE — 25010000002 HYDROMORPHONE 1 MG/ML SOLUTION: Performed by: ANESTHESIOLOGY

## 2022-12-16 PROCEDURE — 94799 UNLISTED PULMONARY SVC/PX: CPT

## 2022-12-16 PROCEDURE — 25010000002 CEFEPIME PER 500 MG: Performed by: HOSPITALIST

## 2022-12-16 PROCEDURE — 25010000002 HEPARIN (PORCINE) PER 1000 UNITS: Performed by: INTERNAL MEDICINE

## 2022-12-16 PROCEDURE — 94640 AIRWAY INHALATION TREATMENT: CPT

## 2022-12-16 PROCEDURE — P9047 ALBUMIN (HUMAN), 25%, 50ML: HCPCS | Performed by: INTERNAL MEDICINE

## 2022-12-16 PROCEDURE — 81001 URINALYSIS AUTO W/SCOPE: CPT | Performed by: FAMILY MEDICINE

## 2022-12-16 PROCEDURE — 94760 N-INVAS EAR/PLS OXIMETRY 1: CPT

## 2022-12-16 PROCEDURE — 84132 ASSAY OF SERUM POTASSIUM: CPT | Performed by: INTERNAL MEDICINE

## 2022-12-16 PROCEDURE — 93010 ELECTROCARDIOGRAM REPORT: CPT | Performed by: INTERNAL MEDICINE

## 2022-12-16 PROCEDURE — 93005 ELECTROCARDIOGRAM TRACING: CPT

## 2022-12-16 PROCEDURE — 25010000002 PROPOFOL 10 MG/ML EMULSION

## 2022-12-16 PROCEDURE — 71045 X-RAY EXAM CHEST 1 VIEW: CPT

## 2022-12-16 PROCEDURE — 25010000002 CALCIUM GLUCONATE PER 10 ML

## 2022-12-16 PROCEDURE — 25010000002 MORPHINE PER 10 MG: Performed by: FAMILY MEDICINE

## 2022-12-16 PROCEDURE — 80053 COMPREHEN METABOLIC PANEL: CPT | Performed by: HOSPITALIST

## 2022-12-16 PROCEDURE — 25010000002 CEFEPIME PER 500 MG: Performed by: ORTHOPAEDIC SURGERY

## 2022-12-16 PROCEDURE — 5A1D70Z PERFORMANCE OF URINARY FILTRATION, INTERMITTENT, LESS THAN 6 HOURS PER DAY: ICD-10-PCS | Performed by: INTERNAL MEDICINE

## 2022-12-16 PROCEDURE — 84100 ASSAY OF PHOSPHORUS: CPT | Performed by: HOSPITALIST

## 2022-12-16 PROCEDURE — 63710000001 INSULIN REGULAR HUMAN PER 5 UNITS

## 2022-12-16 PROCEDURE — 87086 URINE CULTURE/COLONY COUNT: CPT | Performed by: FAMILY MEDICINE

## 2022-12-16 PROCEDURE — 85007 BL SMEAR W/DIFF WBC COUNT: CPT | Performed by: HOSPITALIST

## 2022-12-16 PROCEDURE — 0Y6J0Z1 DETACHMENT AT LEFT LOWER LEG, HIGH, OPEN APPROACH: ICD-10-PCS | Performed by: ORTHOPAEDIC SURGERY

## 2022-12-16 PROCEDURE — 27880 AMPUTATION OF LOWER LEG: CPT | Performed by: SPECIALIST/TECHNOLOGIST, OTHER

## 2022-12-16 PROCEDURE — C1751 CATH, INF, PER/CENT/MIDLINE: HCPCS | Performed by: ANESTHESIOLOGY

## 2022-12-16 PROCEDURE — 88307 TISSUE EXAM BY PATHOLOGIST: CPT

## 2022-12-16 PROCEDURE — 25010000002 EPOETIN ALFA-EPBX 10000 UNIT/ML SOLUTION: Performed by: INTERNAL MEDICINE

## 2022-12-16 PROCEDURE — 83735 ASSAY OF MAGNESIUM: CPT | Performed by: INTERNAL MEDICINE

## 2022-12-16 PROCEDURE — 25010000002 DEXAMETHASONE PER 1 MG

## 2022-12-16 PROCEDURE — 94761 N-INVAS EAR/PLS OXIMETRY MLT: CPT

## 2022-12-16 PROCEDURE — 25010000002 FUROSEMIDE PER 20 MG

## 2022-12-16 PROCEDURE — 85027 COMPLETE CBC AUTOMATED: CPT | Performed by: HOSPITALIST

## 2022-12-16 PROCEDURE — 88311 DECALCIFY TISSUE: CPT

## 2022-12-16 PROCEDURE — 27880 AMPUTATION OF LOWER LEG: CPT | Performed by: ORTHOPAEDIC SURGERY

## 2022-12-16 PROCEDURE — 80202 ASSAY OF VANCOMYCIN: CPT | Performed by: HOSPITALIST

## 2022-12-16 PROCEDURE — 25010000002 ALBUMIN HUMAN 25% PER 50 ML: Performed by: INTERNAL MEDICINE

## 2022-12-16 PROCEDURE — 25010000002 FENTANYL CITRATE (PF) 100 MCG/2ML SOLUTION

## 2022-12-16 RX ORDER — ONDANSETRON 2 MG/ML
INJECTION INTRAMUSCULAR; INTRAVENOUS AS NEEDED
Status: DISCONTINUED | OUTPATIENT
Start: 2022-12-16 | End: 2022-12-16 | Stop reason: SURG

## 2022-12-16 RX ORDER — ALBUMIN (HUMAN) 12.5 G/50ML
12.5 SOLUTION INTRAVENOUS AS NEEDED
Status: DISPENSED | OUTPATIENT
Start: 2022-12-16 | End: 2022-12-16

## 2022-12-16 RX ORDER — DEXTROSE MONOHYDRATE 25 G/50ML
50 INJECTION, SOLUTION INTRAVENOUS ONCE
Status: COMPLETED | OUTPATIENT
Start: 2022-12-16 | End: 2022-12-16

## 2022-12-16 RX ORDER — FENTANYL CITRATE 50 UG/ML
INJECTION, SOLUTION INTRAMUSCULAR; INTRAVENOUS AS NEEDED
Status: DISCONTINUED | OUTPATIENT
Start: 2022-12-16 | End: 2022-12-16 | Stop reason: SURG

## 2022-12-16 RX ORDER — DEXAMETHASONE SODIUM PHOSPHATE 4 MG/ML
INJECTION, SOLUTION INTRA-ARTICULAR; INTRALESIONAL; INTRAMUSCULAR; INTRAVENOUS; SOFT TISSUE AS NEEDED
Status: DISCONTINUED | OUTPATIENT
Start: 2022-12-16 | End: 2022-12-16 | Stop reason: SURG

## 2022-12-16 RX ORDER — KETAMINE HYDROCHLORIDE 100 MG/ML
INJECTION INTRAMUSCULAR; INTRAVENOUS AS NEEDED
Status: DISCONTINUED | OUTPATIENT
Start: 2022-12-16 | End: 2022-12-16 | Stop reason: SURG

## 2022-12-16 RX ORDER — MORPHINE SULFATE 2 MG/ML
2 INJECTION, SOLUTION INTRAMUSCULAR; INTRAVENOUS EVERY 4 HOURS PRN
Status: DISCONTINUED | OUTPATIENT
Start: 2022-12-16 | End: 2022-12-17

## 2022-12-16 RX ORDER — ONDANSETRON 2 MG/ML
4 INJECTION INTRAMUSCULAR; INTRAVENOUS ONCE AS NEEDED
Status: DISCONTINUED | OUTPATIENT
Start: 2022-12-16 | End: 2022-12-16 | Stop reason: HOSPADM

## 2022-12-16 RX ORDER — HEPARIN SODIUM 1000 [USP'U]/ML
4000 INJECTION, SOLUTION INTRAVENOUS; SUBCUTANEOUS AS NEEDED
Status: DISCONTINUED | OUTPATIENT
Start: 2022-12-16 | End: 2022-12-20 | Stop reason: SDUPTHER

## 2022-12-16 RX ORDER — HEPARIN SODIUM 5000 [USP'U]/ML
5000 INJECTION, SOLUTION INTRAVENOUS; SUBCUTANEOUS EVERY 8 HOURS SCHEDULED
Status: DISCONTINUED | OUTPATIENT
Start: 2022-12-17 | End: 2022-12-23 | Stop reason: HOSPADM

## 2022-12-16 RX ORDER — LIDOCAINE HYDROCHLORIDE 20 MG/ML
INJECTION, SOLUTION EPIDURAL; INFILTRATION; INTRACAUDAL; PERINEURAL AS NEEDED
Status: DISCONTINUED | OUTPATIENT
Start: 2022-12-16 | End: 2022-12-16 | Stop reason: SURG

## 2022-12-16 RX ORDER — PROPOFOL 10 MG/ML
VIAL (ML) INTRAVENOUS AS NEEDED
Status: DISCONTINUED | OUTPATIENT
Start: 2022-12-16 | End: 2022-12-16 | Stop reason: SURG

## 2022-12-16 RX ORDER — SODIUM CHLORIDE, SODIUM GLUCONATE, SODIUM ACETATE, POTASSIUM CHLORIDE AND MAGNESIUM CHLORIDE 526; 502; 368; 37; 30 MG/100ML; MG/100ML; MG/100ML; MG/100ML; MG/100ML
INJECTION, SOLUTION INTRAVENOUS CONTINUOUS PRN
Status: DISCONTINUED | OUTPATIENT
Start: 2022-12-16 | End: 2022-12-16 | Stop reason: SURG

## 2022-12-16 RX ADMIN — SODIUM ZIRCONIUM CYCLOSILICATE 10 G: 10 POWDER, FOR SUSPENSION ORAL at 05:47

## 2022-12-16 RX ADMIN — CEFEPIME HYDROCHLORIDE 1 G: 1 INJECTION, POWDER, FOR SOLUTION INTRAMUSCULAR; INTRAVENOUS at 18:23

## 2022-12-16 RX ADMIN — CEFEPIME HYDROCHLORIDE 2 G: 1 INJECTION, POWDER, FOR SOLUTION INTRAMUSCULAR; INTRAVENOUS at 16:25

## 2022-12-16 RX ADMIN — HEPARIN SODIUM 5000 UNITS: 5000 INJECTION INTRAVENOUS; SUBCUTANEOUS at 05:51

## 2022-12-16 RX ADMIN — PANTOPRAZOLE SODIUM 40 MG: 40 TABLET, DELAYED RELEASE ORAL at 21:13

## 2022-12-16 RX ADMIN — DEXTROSE MONOHYDRATE 50 ML: 25 INJECTION, SOLUTION INTRAVENOUS at 05:47

## 2022-12-16 RX ADMIN — FENTANYL CITRATE 25 MCG: 50 INJECTION, SOLUTION INTRAMUSCULAR; INTRAVENOUS at 16:37

## 2022-12-16 RX ADMIN — DEXTROSE MONOHYDRATE 50 ML: 25 INJECTION, SOLUTION INTRAVENOUS at 00:17

## 2022-12-16 RX ADMIN — METRONIDAZOLE 500 MG: 500 INJECTION, SOLUTION INTRAVENOUS at 12:15

## 2022-12-16 RX ADMIN — PROPOFOL 60 MG: 10 INJECTION, EMULSION INTRAVENOUS at 16:20

## 2022-12-16 RX ADMIN — DEXAMETHASONE SODIUM PHOSPHATE 4 MG: 4 INJECTION, SOLUTION INTRAMUSCULAR; INTRAVENOUS at 16:37

## 2022-12-16 RX ADMIN — HYDROMORPHONE HYDROCHLORIDE 0.5 MG: 1 INJECTION, SOLUTION INTRAMUSCULAR; INTRAVENOUS; SUBCUTANEOUS at 18:03

## 2022-12-16 RX ADMIN — MORPHINE SULFATE 2 MG: 2 INJECTION, SOLUTION INTRAMUSCULAR; INTRAVENOUS at 19:51

## 2022-12-16 RX ADMIN — ATORVASTATIN CALCIUM 20 MG: 20 TABLET, FILM COATED ORAL at 21:13

## 2022-12-16 RX ADMIN — METRONIDAZOLE 500 MG: 500 INJECTION, SOLUTION INTRAVENOUS at 21:12

## 2022-12-16 RX ADMIN — FENTANYL CITRATE 50 MCG: 50 INJECTION, SOLUTION INTRAMUSCULAR; INTRAVENOUS at 16:44

## 2022-12-16 RX ADMIN — Medication 10 ML: at 09:09

## 2022-12-16 RX ADMIN — ALBUTEROL SULFATE 10 MG: 2.5 SOLUTION RESPIRATORY (INHALATION) at 05:01

## 2022-12-16 RX ADMIN — ALBUMIN (HUMAN) 12.5 G: 0.25 INJECTION, SOLUTION INTRAVENOUS at 07:58

## 2022-12-16 RX ADMIN — OXYCODONE HYDROCHLORIDE 10 MG: 10 TABLET ORAL at 21:13

## 2022-12-16 RX ADMIN — MEMANTINE 5 MG: 5 TABLET ORAL at 21:13

## 2022-12-16 RX ADMIN — FENTANYL CITRATE 25 MCG: 50 INJECTION, SOLUTION INTRAMUSCULAR; INTRAVENOUS at 16:40

## 2022-12-16 RX ADMIN — ONDANSETRON 4 MG: 2 INJECTION INTRAMUSCULAR; INTRAVENOUS at 16:37

## 2022-12-16 RX ADMIN — PROPOFOL 40 MG: 10 INJECTION, EMULSION INTRAVENOUS at 16:47

## 2022-12-16 RX ADMIN — SODIUM CHLORIDE, SODIUM GLUCONATE, SODIUM ACETATE, POTASSIUM CHLORIDE AND MAGNESIUM CHLORIDE: 526; 502; 368; 37; 30 INJECTION, SOLUTION INTRAVENOUS at 16:20

## 2022-12-16 RX ADMIN — ALBUMIN (HUMAN) 12.5 G: 0.25 INJECTION, SOLUTION INTRAVENOUS at 08:58

## 2022-12-16 RX ADMIN — FENTANYL CITRATE 50 MCG: 50 INJECTION, SOLUTION INTRAMUSCULAR; INTRAVENOUS at 16:51

## 2022-12-16 RX ADMIN — FENTANYL CITRATE 50 MCG: 50 INJECTION, SOLUTION INTRAMUSCULAR; INTRAVENOUS at 16:47

## 2022-12-16 RX ADMIN — KETAMINE HYDROCHLORIDE 20 MG: 100 INJECTION INTRAMUSCULAR; INTRAVENOUS at 16:20

## 2022-12-16 RX ADMIN — PROPOFOL 40 MG: 10 INJECTION, EMULSION INTRAVENOUS at 17:31

## 2022-12-16 RX ADMIN — HYDROMORPHONE HYDROCHLORIDE 0.5 MG: 1 INJECTION, SOLUTION INTRAMUSCULAR; INTRAVENOUS; SUBCUTANEOUS at 18:21

## 2022-12-16 RX ADMIN — HEPARIN SODIUM 4000 UNITS: 1000 INJECTION INTRAVENOUS; SUBCUTANEOUS at 07:57

## 2022-12-16 RX ADMIN — HUMAN INSULIN 10 UNITS: 100 INJECTION, SOLUTION SUBCUTANEOUS at 00:17

## 2022-12-16 RX ADMIN — HUMAN INSULIN 10 UNITS: 100 INJECTION, SOLUTION SUBCUTANEOUS at 05:45

## 2022-12-16 RX ADMIN — ALBUTEROL SULFATE 10 MG: 2.5 SOLUTION RESPIRATORY (INHALATION) at 00:33

## 2022-12-16 RX ADMIN — LIDOCAINE HYDROCHLORIDE 100 MG: 20 INJECTION, SOLUTION EPIDURAL; INFILTRATION; INTRACAUDAL; PERINEURAL at 16:20

## 2022-12-16 RX ADMIN — CALCIUM GLUCONATE 1 G: 98 INJECTION, SOLUTION INTRAVENOUS at 07:00

## 2022-12-16 RX ADMIN — SODIUM BICARBONATE 50 MEQ: 84 INJECTION INTRAVENOUS at 00:17

## 2022-12-16 RX ADMIN — METRONIDAZOLE 500 MG: 500 INJECTION, SOLUTION INTRAVENOUS at 04:41

## 2022-12-16 RX ADMIN — CALCIUM GLUCONATE 1 G: 98 INJECTION, SOLUTION INTRAVENOUS at 00:17

## 2022-12-16 RX ADMIN — EPOETIN ALFA-EPBX 10000 UNITS: 10000 INJECTION, SOLUTION INTRAVENOUS; SUBCUTANEOUS at 09:17

## 2022-12-16 NOTE — CONSULTS
Adult Nutrition  Assessment/PES    Patient Name:  Yamileth Slater  YOB: 1960  MRN: [id number]  Admit Date:  12/15/2022    Assessment Date:  12/16/2022    Comments:  Pt admitted from the NH with a trauma of the left foot that resulted in an infections and ultimately gangrene.  She is scheduled for a BKA today. Hx of ESRD--on hemodialysis.  She is currently receiving dialysis due to elevated K+, Phosphorus, and Creat.  Pt also has a hx of DM, CHF, CKD, COPD, HTN and PVD. RD will continue to monitor POC and make recommendations as appropriate.       Reason for Assessment     Row Name 12/16/22 144          Reason for Assessment    Reason For Assessment identified at risk by screening criteria     Diagnosis other (see comments);renal disease  Left foot necrosis     Identified At Risk by Screening Criteria large or nonhealing wound, burn or pressure injury                Nutrition/Diet History     Row Name 12/16/22 1447          Nutrition/Diet History    Typical Intake (Food/Fluid/EN/PN) Pt sleeping soundly.  receiving dialysis                Labs/Tests/Procedures/Meds     Row Name 12/16/22 9423          Labs/Procedures/Meds    Lab Results Reviewed reviewed, pertinent     Lab Results Comments Na 135; K+ 7.7; Creat 6.27; Bun 72; Gluc 121/149; Phos 8.9; Alb 3.10        Diagnostic Tests/Procedures    Diagnostic Test/Procedure Reviewed reviewed, pertinent     Diagnostic Test/Procedures Comments Dialysis; for BKA        Medications    Pertinent Medications Reviewed reviewed, pertinent     Pertinent Medications Comments Cefepime; Epoetin; lasix; Neurontin; SSI; Renvela; Protonix                Physical Findings     Row Name 12/16/22 5850          Physical Findings    Overall Physical Appearance Trauma of the left foot resulting in infection and ultimate gangrene; Woudn to the coccyx; Wound right anterior great toe;                Estimated/Assessed Needs - Anthropometrics     Row Name 12/16/22 4735  12/16/22 0700       Anthropometrics    Weight -- 126 kg (277 lb 12.5 oz)    Height for Calculation 1.575 m (5' 2\") --    Weight for Calculation 66.7 kg (147 lb)  Adjusted body wt --       Estimated/Assessed Needs    Additional Documentation Additional Requirements (Group);Fluid Requirements (Group);Modified [place] Equation (Group);Estimated Calorie Needs (Group);KCAL/KG (Group);[place]-St. Jeor Equation (Group);Protein Requirements (Group) --       KCAL/KG    KCAL/KG 25 Kcal/Kg (kcal) --    25 Kcal/Kg (kcal) 1666.975 --       [place]-St. Jeor Equation    RMR ([place]-St. Jeor Equation) 1175.167 --       Protein Requirements    Weight Used For Protein Calculations 66.7 kg (147 lb) --    Est Protein Requirement Amount (gms/kg) 1.3 gm protein --    Estimated Protein Requirements (gms/day) 86.68 --       Fluid Requirements    Fluid Requirements (mL/day) 1200 --    Estimated Fluid Requirement Method other (see comments)  dialysis pt --    RDA Method (mL) 1200 --               Nutrition Prescription Ordered     Row Name 12/16/22 1454          Nutrition Prescription PO    Current PO Diet NPO                     Problem/Interventions:   Problem 1     Row Name 12/16/22 1454          Nutrition Diagnoses Problem 1    Problem 1 Increased Nutrient Needs     Macronutrient Protein     Etiology (related to) Medical Diagnosis     Renal Hemodialysis;ESRD     Skin Non healing wound     Other Comment Hypermetabolic state                Problem 2     Row Name 12/16/22 2495          Nutrition Diagnoses Problem 2    Problem 2 Altered Nutrition Related to Labs     Etiology (related to) Medical Diagnosis     Metabolic/ICU Hperkalemia;Hyperphosphatemia     Renal ESRD;Hemodialysis     Signs/Symptoms (evidenced by) Biochemical     Specific Labs Noted BUN;Creatinine;K+;Phosphorus                    Intervention Goal     Row Name 12/16/22 2402          Intervention Goal    General Reduce/improve symptoms     Weight No significant weight loss                 Nutrition Intervention     Row Name 12/16/22 1456          Nutrition Intervention    RD/Tech Action Follow Tx progress;Care plan reviewd                  Education/Evaluation     Row Name 12/16/22 1457          Education    Education Will Instruct as appropriate        Monitor/Evaluation    Monitor Per protocol;I&O;Pertinent labs;Weight;Skin status;Symptoms                 Electronically signed by:  Gissel Littlejohn RD  12/16/22 14:58 CST

## 2022-12-16 NOTE — PROGRESS NOTES
Noted that patient has potassium level 7.3.  Immediately ordered 1 amp D50 then 5 units regular insulin IV x1.  Also ordered 50 mill equivalent sodium bicarb IVP x2.  Also ordered Lokelma x1 dose.  We will recheck potassium level at 2300 and in a.m to assure further correction not required.  We will also give 1 amp calcium gluconate to assure stabilization of cardiac membranes.

## 2022-12-16 NOTE — ANESTHESIA PROCEDURE NOTES
Central Line      Patient reassessed immediately prior to procedure    Patient location during procedure: OR  Start time: 12/16/2022 4:10 PM  Stop Time:12/16/2022 4:17 PM  Indications: vascular access, central pressure monitoring and MD/Surgeon request  Staff  Anesthesiologist: Myesha Sharif DO  Preanesthetic Checklist  Completed: patient identified, IV checked, site marked, risks and benefits discussed, surgical consent, monitors and equipment checked, pre-op evaluation and timeout performed  Central Line Prep  Sterile Tech:cap, gloves, gown, mask and sterile barriers  Prep: chloraprep  Patient monitoring: EKG, continuous pulse oximetry and blood pressure monitoring  Central Line Procedure  Laterality:left  Location:internal jugular  Catheter Type:triple lumen  Catheter Size:7 Fr  Guidance:ultrasound guided  PROCEDURE NOTE/ULTRASOUND INTERPRETATION.  Using ultrasound guidance the potential vascular sites for insertion of the catheter were visualized to determine the patency of the vessel to be used for vascular access.  After selecting the appropriate site for insertion, the needle was visualized under ultrasound being inserted into the internal jugular vein, followed by ultrasound confirmation of wire and catheter placement. There were no abnormalities seen on ultrasound; an image was taken; and the patient tolerated the procedure with no complications. Images: still images not obtained  Assessment  Post procedure:biopatch applied, line sutured and occlusive dressing applied  Assessement:blood return through all ports, free fluid flow, chest x-ray ordered and Talha Test  Complications:no  Patient Tolerance:patient tolerated the procedure well with no apparent complications

## 2022-12-16 NOTE — PROGRESS NOTES
ORTHOPEDIC PROGRESS NOTE:    Name:  Yamileth Slater  Date:    2022  Date of admission:  12/15/2022      Subjective:  Events of last night noted.  No fever or chills  Has had hyperkalemia, responding somewhat to medical treatment, heading to dialysis in about 30 minutes.    Vitals:     Vitals:    22 0600   BP: 101/59   Pulse: 81   Resp:    Temp:    SpO2: 96%      Temp (24hrs), Av.9 °F (36.1 °C), Min:96.3 °F (35.7 °C), Max:97.9 °F (36.6 °C)      Exam:  No changes in ortho exam.  Open transmetatarsal amputation site has exudate around the edges and mild necrosis along the flaps.  Metatarsals noted 1,2,3,4.  Mild erythema in foot and distal tibia region.  No active drainage.    ASSESSMENT:  Active Hospital Problems    Diagnosis  POA   • **Venous insufficiency [I87.2]  Yes         PLAN:    Patient scheduled for BKA left today.  Hyperkalemia obviously an issue.  Will see how it responds to dialysis.  Patient will never be a \"good\" surgery candidate, however, will discuss with nephrology and anesthesia to see if she can be optimized a little better after dialysis today.  Otherwise, will proceed with BKA and remove the necrotic tissue which will also help overall medical condition.    All questions answered.      22 at 06:33 CST by Huy White MD

## 2022-12-16 NOTE — SIGNIFICANT NOTE
12/16/22 0732   OTHER   Discipline occupational therapist;physical therapist   Rehab Time/Intention   Session Not Performed other (see comments)  (Patient scheduled for BKA, will need new orders post surgery.)   Recommendation   PT - Next Appointment 12/17/22   Recommendation   OT - Next Appointment 12/17/22

## 2022-12-16 NOTE — INTERVAL H&P NOTE
H&P updated. The patient was examined and the following changes are noted:  patient was noted to have hyperkalemia mostly because she skipped dialysis on Wednesday.  She responded to treatment and to dialysis today.     Vitals:    12/16/22 1500   BP: 93/66   Pulse: 82   Resp: 16   Temp:    SpO2: 97%       Allergies   Allergen Reactions    Adhesive Tape Hives, Other (See Comments) and Rash    Nsaids Hives    Latex Other (See Comments) and Hives    Other Itching     Bandaids, MRSA, SURECLOSE    Wound Dressing Adhesive Hives and Rash       Prior to Admission medications    Medication Sig Start Date End Date Taking? Authorizing Provider   albuterol (PROVENTIL) (2.5 MG/3ML) 0.083% nebulizer solution Inhale the contents of 1 vial by nebulization Every 4 (Four) Hours As Needed for Wheezing. 10/28/21  Yes Haily Mcneil MD   albuterol sulfate  (90 Base) MCG/ACT inhaler Inhale 2 puffs Every 4 (Four) Hours As Needed for Wheezing. 3/26/21  Yes Nirmal Calvillo MD   atorvastatin (LIPITOR) 20 MG tablet Take 1 tablet by mouth every night at bedtime. 4/26/22  Yes Kit Brar MD   Cetirizine HCl 10 MG capsule Take 1 capsule by mouth Daily. 11/4/21  Yes Caio Thompson MD   clopidogrel (PLAVIX) 75 MG tablet Take 1 tablet by mouth Daily. 3/26/21  Yes Nirmal Calvillo MD   CYCLOBENZAPRINE HCL PO Take 5 mg by mouth 2 (Two) Times a Day. 10/28/22  Yes Kristie Kern DPM   docusate sodium (COLACE) 100 MG capsule Take 100 mg by mouth 2 (Two) Times a Day. 1/1/21  Yes Caio Thompson MD   acetaminophen (Tylenol 8 Hour) 650 MG 8 hr tablet Take 1 tablet by mouth Every 8 (Eight) Hours As Needed for Mild Pain . 2/1/22   Blake Burris MD   Blood Glucose Monitoring Suppl ([facility] Blood Glucose Meter) w/Device kit 1 each 3 (Three) Times a Day. 10/9/20   Nirmal Calvillo MD   Capsaicin 0.035 % cream Apply 3 g topically 3 (Three) Times a Day As Needed (ankle pain). 4/26/22   Kit Brar,  MD   Diclofenac Sodium (VOLTAREN) 1 % gel gel Apply 4 g topically to the appropriate area as directed 4 (Four) Times a Day As Needed (Ankle pain). 4/26/22   Kit Brar MD   DULoxetine (CYMBALTA) 20 MG capsule Take 20 mg by mouth Daily. Indications: Disease of the Peripheral Nerves 1/1/21   Caio Thompson MD   Easy Touch Insulin Syringe 30G X 5/16\" 0.5 ML misc USE AS DIRECTED WITH LEVEMIR 12/1/21   Caio Thompson MD   EASY TOUCH PEN NEEDLES 31G X 8 MM misc  8/7/20   Caio Thompson MD   glucose blood test strip Use as instructed 10/9/20   Nimral Calvillo MD   hydrOXYzine (ATARAX) 25 MG tablet Take 25 mg by mouth Every 8 (Eight) Hours As Needed for Itching. 1/1/21   Caio Thompson MD   Insulin Lispro (humaLOG) 100 UNIT/ML injection Inject 14 Units under the skin into the appropriate area as directed 3 (Three) Times a Day Before Meals. In addition to sliding scale    Caio Thompson MD   Insulin Lispro, 1 Unit Dial, (HUMALOG) 100 UNIT/ML solution pen-injector Inject 12 Units under the skin into the appropriate area as directed 3 (Three) Times a Day With Meals.  Patient taking differently: Inject  under the skin into the appropriate area as directed 3 (Three) Times a Day With Meals. Takes 14 units with meals plus sliding scale  Sliding scale:   150-199 take 4 units  200-249 take 8 units  250-299 take 12 units  300-349 take 16 units  350-400 take 20 units  greater than 400, call physician 5/24/22   Blake Burris MD   Insulin Pen Needle (NovoFine) 30G X 8 MM misc As directed 4 times daily 3/26/21   Nirmal Calvillo MD   Insulin Pen Needle 31G X 8 MM misc Use to inject insulin 4 (Four) Times a Day as directed. 10/28/21   Haily Mcneil MD   ipratropium-albuterol (DUO-NEB) 0.5-2.5 mg/3 ml nebulizer Take 3 mL by nebulization 4 (Four) Times a Day As Needed. 3/22/17   Caio Thompson MD   losartan (COZAAR) 100 MG tablet Take 100 mg by mouth Daily. 10/28/22    Kristie Kern, RAMIREZ   memantine (NAMENDA) 5 MG tablet Take 5 mg by mouth 2 (Two) Times a Day. 10/28/22   ConchaKristie, DPM   Methoxy PEG-Epoetin Beta (MIRCERA IJ) 225 mcg Every 14 (Fourteen) Days. 3/14/22 3/13/23  Caio Thompson MD   montelukast (SINGULAIR) 10 MG tablet Take 1 tablet by mouth Every Night. 4/26/22   Kit Brar MD   nitroglycerin (NITROSTAT) 0.4 MG SL tablet Place 0.4 mg under the tongue Every 5 (Five) Minutes As Needed for Chest Pain (x 3 doses). 1/1/15   Caio Thompson MD   O2 (OXYGEN) Inhale 2 L/min Continuous. 1/1/21   Caio Thompson MD   ondansetron ODT (Zofran ODT) 4 MG disintegrating tablet Place 1 tablet on the tongue Every 8 (Eight) Hours As Needed for Nausea or Vomiting. 3/26/21   Nirmal Calvillo MD   oxyCODONE (ROXICODONE) 10 MG tablet Take 10 mg by mouth 4 (Four) Times a Day As Needed for Moderate Pain or Severe Pain. 11/4/22   Caio Thompson MD   pantoprazole (PROTONIX) 40 MG EC tablet Take 1 tablet by mouth Every Night. 4/26/22   Kit Brar MD   promethazine (PHENERGAN) 25 MG tablet Take 25 mg by mouth Every 6 (Six) Hours As Needed for Nausea or Vomiting. 11/4/22   Caio Thompson MD   sevelamer (RENVELA) 800 MG tablet Take 800 mg by mouth 3 (Three) Times a Day With Meals. 1/26/22   Caio Thompson MD   Vitamin D, Ergocalciferol, 01140 units capsule Take 50,000 Units by mouth 1 (One) Time Per Week. Take on Wednesday  Indications: Kidney Failure Syndrome 1/1/21   Caio Thompson MD   gabapentin (NEURONTIN) 300 MG capsule Take 300 mg by mouth Daily. Repeat x 1 on M_W_F (dialysis days)  12/16/22  Caio Thompson MD   insulin aspart (novoLOG FLEXPEN) 100 UNIT/ML solution pen-injector sc pen Inject 30 Units under the skin into the appropriate area as directed 3 (Three) Times a Day With Meals. 2/1/22 3/17/22  Blake Burris MD   insulin detemir (LEVEMIR) 100 UNIT/ML injection Inject 24 Units under the  skin into the appropriate area as directed 2 (Two) Times a Day.  12/16/22  Provider, MD Caio       Past Medical History:   Diagnosis Date    Acute blood loss anemia 04/16/2017    Likely due to gastric oozing at this time. - Dr. Duarte (GI) was consulted and has now signed off, will follow up outpatient - pill colonoscopy showed AVMs - continue to monitor    Acute cystitis with hematuria 03/31/2021 1/13: IV Rocephin 1 gm q 24 1/14 : transitioned  to omnicef 300mg. Urine cultures resulted and did not show growth. Omnicef discontinued as patient is asymptomatic    Altered mental status 01/09/2022    - AMS on presentation - initial ABG pH 7.3, CO2 34 - Procal 0.29 - UA negative for acute cysitits -CTA head wnl  - Empiric Zosyn and Vancomycin -Lactate 2.5 on admission  - blood cultures no growth at 24 hours      Anxiety     CAD (coronary artery disease) 04/24/2021    S/P 3 stents 5/1/2021 for [facility] Continue ASA 81mg & Clopidogrel 75mg Continue Atorvastatin 40mg    Carotid artery stenosis     CHF (congestive heart failure) ([facility])     Chronic obstructive lung disease (HCC)     CKD (chronic kidney disease) stage 4, GFR 15-29 ml/min ([facility])     CKD (chronic kidney disease), symptom management only, stage 5 (HCC) 10/05/2020    Results from last 7 days Lab Units 12/15/21 0548 12/14/21 1323 12/14/21 0916 CREATININE mg/dL 3.92* 3.21* 3.32*  Baseline creatinine 2-3 GFR 13-25 GFR 15 Dialysis MWF, sees Dr. Lauren Nephrology consult,, appreciate recommendations Continue Bumex 1mg bid daily Holding Bumex 2mg 4 times a week    Colonic polyp     Coronary arteriosclerosis     Diabetes mellitus (HCC)     Diabetic neuropathy (HCC)     Ear pain, right 10/18/2021    - canal trauma due to patient scratching and DMT2 - added cortisporin ear drops    Elevated troponin 10/12/2021    -most likely from CKD -Trending down -Neg chest pain    Generalized abdominal pain 07/01/2022    Could be due to initiation of tube feeds vs dyspepsia vs  abdominal cramps related to no PO intake due to intubation vs constipation Continue current laxative regiment  If no bowel movement by this afternoon will consider enema    GERD (gastroesophageal reflux disease)     GI bleed 05/13/2021    - GI will follow up outpatient - Protonix 40mg daily - Avoid medical DVT prophy and use mechanical at this time instead. - Continue to monitor - pill colonoscopy results showed AVMs    History of transfusion     Hypercholesterolemia     Hyperosmolar hyperglycemic state (HHS) ([facility]) 06/25/2022    Serum glucose 605 on admission  Anion gap 16 PH 7.37 Bicarb 27.9 Continue fluids  Insulin drip with [facility] protocol  Anion gap closed around 10 AM, received one dose of Levamir subq, will stop insulin drip after 2 hrs      Hypertension     Hypokalemia 05/27/2022    Will replace as needed. Will be cautious in the setting of ESRD to avoid need for emergency dialysis   Monitor Qtc intervals on EKG      Hypomagnesemia 06/27/2021    Monitor and replace    Morbid obesity ([facility])     Nephrolithiasis     On mechanically assisted ventilation ([facility]) 06/26/2022    Vent management and sedation orders placed.  - Critical access hospital intensivist group consulted for vent management appreciate recommendations  - plan to extubate today      Peripheral vascular disease ([facility])     Pleural effusion on right 06/26/2022    CXR on 6/30/22 read as a small upper left pulmonary edema vs early pneumonia.  Last Echo 1/2022 EF 61-65 % Continue to monitor  Procal slightly improved, CRP improved On Linezolid and meropenum       PVD (peripheral vascular disease) ([facility])     SIRS (systemic inflammatory response syndrome) ([facility]) 01/09/2022    Admission  - WBC 17.78   -   - RR 16 - 1/10: VSS/wnl - CXR - Mild pulm edema - Blood cultures no growth at 24 hours  - Procalcitonin 0.29 - UA : glucose 1000, negative Leucocytes/nitrate - Empiric Zosyn and Vancomcyin     Sleep apnea     Substance abuse ([facility])     Vitamin D deficiency        Past  Surgical History:   Procedure Laterality Date    CARDIAC CATHETERIZATION N/A 07/14/2020    CARDIAC CATHETERIZATION N/A 04/23/2021    Procedure: Left Heart Cath;  Surgeon: Melba Romo MD;  Location: [facility] CATH INVASIVE LOCATION;  Service: Cardiology;  Laterality: N/A;    CARDIAC CATHETERIZATION N/A 04/30/2021    Procedure: Percutaneous Coronary Intervention;  Surgeon: Russell Voss MD;  Location:  [facility] CATH INVASIVE LOCATION;  Service: Cardiovascular;  Laterality: N/A;    CARDIAC CATHETERIZATION N/A 04/30/2021    Procedure: Stent NIKKI coronary;  Surgeon: Russell Voss MD;  Location[facility] CATH INVASIVE LOCATION;  Service: Cardiovascular;  Laterality: N/A;    CARDIAC CATHETERIZATION Left 11/13/2021    Procedure: Left Heart Cath;  Surgeon: Niall Rios MD;  Location: [facility] CATH INVASIVE LOCATION;  Service: Cardiology;  Laterality: Left;    CAROTID STENT Left     COLONOSCOPY      COLONOSCOPY N/A 05/14/2021    Procedure: COLONOSCOPY;  Surgeon: Mnigo Duarte MD;  Location: [facility] ENDOSCOPY;  Service: Gastroenterology;  Laterality: N/A;    CORONARY ARTERY BYPASS GRAFT N/A 2013    CABG X 3    CYSTOSCOPY BLADDER STONE LITHOTRIPSY Bilateral     ENDOSCOPY N/A 04/12/2021    Procedure: ESOPHAGOGASTRODUODENOSCOPY;  Surgeon: Mingo Duarte MD;  Location: [facility] ENDOSCOPY;  Service: Gastroenterology;  Laterality: N/A;    ENDOSCOPY N/A 05/14/2021    Procedure: ESOPHAGOGASTRODUODENOSCOPY;  Surgeon: Mingo Duarte MD;  Location: [facility] ENDOSCOPY;  Service: Gastroenterology;  Laterality: N/A;    ENDOSCOPY N/A 01/28/2022    Procedure: ESOPHAGOGASTRODUODENOSCOPY;  Surgeon: Mingo Duarte MD;  Location: [facility] ENDOSCOPY;  Service: Gastroenterology;  Laterality: N/A;    HYSTERECTOMY      INTERVENTIONAL RADIOLOGY PROCEDURE N/A 10/21/2021    Procedure: tunneled central venous catheter placement;  Surgeon: Donnie Robles MD;  Location: [facility] ANGIO INVASIVE LOCATION;  Service:  Interventional Radiology;  Laterality: N/A;    INTERVENTIONAL RADIOLOGY PROCEDURE N/A 01/27/2022    Procedure: tunneled central venous catheter placement;  Surgeon: Donnie Robles MD;  Location: [facility] INVASIVE LOCATION;  Service: Interventional Radiology;  Laterality: N/A;    LAPAROSCOPIC TUBAL LIGATION      TUNNELED VENOUS CATHETER PLACEMENT         Social History     Socioeconomic History    Marital status: Legally      Spouse name: huy dumont   Tobacco Use    Smoking status: Every Day     Packs/day: 0.50     Years: 46.00     Pack years: 23.00     Types: Cigarettes     Start date: 2/1/1999    Smokeless tobacco: Never   Vaping Use    Vaping Use: Never used   Substance and Sexual Activity    Alcohol use: No    Drug use: Not Currently     Types: LSD, Marijuana, Methamphetamines    Sexual activity: Defer       Family History   Problem Relation Age of Onset    Heart disease Mother     Lung cancer Mother     Heart disease Father     Heart attack Father     Diabetes Father     Heart disease Half-Sister         Dad's side    Heart disease Brother     No Known Problems Sister     No Known Problems Sister     No Known Problems Sister     No Known Problems Sister     No Known Problems Sister     Pancreatic cancer Half-Sister         Dad's side    No Known Problems Brother     No Known Problems Brother     Heart attack Half-Brother     Heart attack Half-Brother     No Known Problems Maternal Grandmother     No Known Problems Maternal Grandfather     No Known Problems Paternal Grandmother     No Known Problems Paternal Grandfather              This document has been electronically signed by Huy White MD on December 16, 2022 15:39 CST

## 2022-12-16 NOTE — TREATMENT PLAN
T waves definitely more prominently EKG that was done stat at 00 19 hours.  We will proceed with aggressive treatment of the hyperkalemia and consider possible urgent dialysis should the potassium be refractory to medical treatment.    Electronically signed by Quan Lim MD, 12/16/22, 2:26 AM CST.

## 2022-12-16 NOTE — ANESTHESIA PREPROCEDURE EVALUATION
Anesthesia Evaluation     Patient summary reviewed and Nursing notes reviewed   no history of anesthetic complications:  NPO Solid Status: > 8 hours  NPO Liquid Status: > 8 hours           Airway   Mallampati: II  TM distance: >3 FB  Neck ROM: full  possible difficult intubation  Comment: ate: 09/19/22; Time: 1848; Blade Size: 4; Airway Device: LMA; Difficulty: Easy; Atraumatic? Yes; Removal Date: 09/19/22; Removal Time: 1940  Dental    (+) poor dentation    Pulmonary    (+) a smoker Current Abstained day of surgery, COPD moderate, sleep apnea on CPAP, decreased breath sounds,   (-) shortness of breath    ROS comment: 1.  Low lung volumes.     2.  Cardiomegaly.  Narrative    FINDINGS:   Right-sided dialysis catheter is in place.  The heart is enlarged.  No focal   pulmonary consolidations and no pleural effusions.  Exam End: 09/19/22 09:49      Cardiovascular     ECG reviewed  PT is on anticoagulation therapy  Rhythm: regular  Rate: normal    (+) hypertension, past MI , CAD, CABG >6 Months, cardiac stents CHF , ALANIS, murmur (Grade II/VI systolic), PVD, hyperlipidemia,  carotid artery disease carotid bilateral  (-) angina, carotid bruits    ROS comment: Normal sinus rhythm  Right superior axis deviation  Nonspecific intraventricular block  Abnormal ECG  When compared with ECG of 16-DEC-2022 00:19,  No significant change was foundEstimated left ventricular EF = 65% Left ventricular ejection fraction appears to be 61 - 65%. Left ventricular systolic function is normal.Final impression: Successful Pronto thrombectomy and PTCA and stenting of the distal left main and ostial circumflex artery was done with deployment of four 3.25 mm x 15 mm Xience Gloria drug-eluting stent with reduction of stenosis from 95% to less than 0% stenosis with good BEATRIZ-3 flow.    Neuro/Psych  (+) psychiatric history Anxiety,    GI/Hepatic/Renal/Endo    (+) morbid obesity, GERD well controlled,  renal disease (Recieving dialysis 12/16/22 to  finish @ 1100. ) CRI, dialysis and stones, diabetes mellitus type 2 using insulin, thyroid problem hypothyroidism    Musculoskeletal     Abdominal   (+) obese,    Substance History - negative use     OB/GYN negative ob/gyn ROS         Other   arthritis,      ROS/Med Hx Other: HGB 10.5 CRE and MSRA                Anesthesia Plan    ASA 4     general     (Discussed central line,arterial line if necessary and patient understands possible complications,risks and agrees.)  intravenous induction     Anesthetic plan, risks, benefits, and alternatives have been provided, discussed and informed consent has been obtained with: patient.  Pre-procedure education provided  Plan discussed with CRNA.        CODE STATUS:    Level Of Support Discussed With: Patient  Code Status (Patient has no pulse and is not breathing): CPR (Attempt to Resuscitate)  Medical Interventions (Patient has pulse or is breathing): Full Support

## 2022-12-16 NOTE — PAYOR COMM NOTE
[facility]  Case Managment Extender   Emilie Adams  (P) [phone]  (F) [phone]        REF# [id number]  Yamileth Slater Nga (62 y.o. Female)     Date of Birth   1960    Social Security Number       Address   [address] [place] [address]    Home Phone   [phone]    MRN   [id number]       Amish   None    Marital Status   Legally                             Admission Date   12/15/22    Admission Type   Urgent    Admitting Provider   Nestor Richards MD    Attending Provider   Nestor Richards MD    Department, Room/Bed   [facility] CRITICAL CARE STEPDOWN, 11/A       Discharge Date       Discharge Disposition       Discharge Destination                               Attending Provider: Nestor Richards MD    Allergies: Adhesive Tape, Nsaids, Latex, Other, Wound Dressing Adhesive    Isolation: Contact   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), 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)   Code Status: CPR    Ht: 157.5 cm (62.01\")   Wt: 126 kg (277 lb 12.5 oz)    Admission Cmt: None   Principal Problem: Venous insufficiency [I87.2]                 Active Insurance as of 12/15/2022     Primary Coverage     Payor Plan Insurance Group Employer/Plan Group    ANTHEM MEDICARE REPLACEMENT ANTHEM MEDICARE ADVANTAGE [id number]     Payor Plan Address Payor Plan Phone Number Payor Plan Fax Number Effective Dates    [address] [phone]  1/1/2022 - None Entered    [facility] [id number]       Subscriber Name Subscriber Birth Date Member ID       YAMILETH SLATER 1960 [id number]           Secondary Coverage     Payor Plan Insurance Group Employer/Plan Group    [place]      Payor Plan Address Payor Plan Phone Number Payor Plan Fax Number Effective Dates    [address] [phone]  6/28/2019 - None Entered    [facility] [id number]       Subscriber Name Subscriber Birth Date Member ID       YAMILETH SLATER 1960  [id number]                 Emergency Contacts      (Rel.) Home Phone Work Phone Mobile Phone    RAMONA KHAN (Sister) -- -- [phone]    Yamileth Chavez (Daughter) [phone] -- [phone]    Suzanne Rivera (Daughter) [phone] -- [phone]               History & Physical      Juvenal Roberson DO at 12/15/22 1726                [facility] Medicine Admission      Date of Admission: 12/15/2022      Primary Care Physician: Kiersten Wilson APRN      Chief Complaint: Left foot pain secondary to necrosis    HPI:  The patient she is a 62-year-old female admitted to the hospital direct admission from orthopedic surgery.  The patient has a longstanding history of diabetic neuropathy and had trauma to her left foot which resulted in infection and ultimately gangrene.  She has had a transmetatarsal amputation completed which continued to worsen and gangrene once again set in and now she is being admitted to the hospital for below the knee amputation.  The patient states that this pain is controlled with oxycodone.  She has had some nausea vomiting.  No fevers or chills.  No headache no chest pain no worsening of shortness of breath.  She has a history of end-stage renal disease Dr. Gupta is her nephrologist and has been consulted to manage patient's hemodialysis which is to be completed on Monday Wednesday and Friday.  The patient will be scheduled for surgery tomorrow afternoon after dialysis has been completed.  Case has been discussed with Dr. White from orthopedic surgery and Dr. Gupta from nephrology.      Concurrent Medical History:  has a past medical history of Acute blood loss anemia (04/16/2017), Acute cystitis with hematuria (03/31/2021), Altered mental status (01/09/2022), Anxiety, CAD (coronary artery disease) (04/24/2021), Carotid artery stenosis, CHF (congestive heart failure) (HCC), Chronic obstructive lung disease (HCC), CKD (chronic  kidney disease) stage 4, GFR 15-29 ml/min ([facility]), CKD (chronic kidney disease), symptom management only, stage 5 ([facility]) (10/05/2020), Colonic polyp, Coronary arteriosclerosis, Diabetes mellitus ([facility]), Diabetic neuropathy ([facility]), Ear pain, right (10/18/2021), Elevated troponin (10/12/2021), Generalized abdominal pain (07/01/2022), GERD (gastroesophageal reflux disease), GI bleed (05/13/2021), History of transfusion, Hypercholesterolemia, Hyperosmolar hyperglycemic state (HHS) ([facility]) (06/25/2022), Hypertension, Hypokalemia (05/27/2022), Hypomagnesemia (06/27/2021), Morbid obesity ([facility]), Nephrolithiasis, On mechanically assisted ventilation ([facility]) (06/26/2022), Peripheral vascular disease ([facility]), Pleural effusion on right (06/26/2022), PVD (peripheral vascular disease) ([facility]), SIRS (systemic inflammatory response syndrome) ([facility]) (01/09/2022), Sleep apnea, Substance abuse ([facility]), and Vitamin D deficiency.    Past Surgical History:  has a past surgical history that includes Colonoscopy; Carotid stent (Left); Cardiac catheterization (N/A, 07/14/2020); cystoscopy bladder stone lithotripsy (Bilateral); Esophagogastroduodenoscopy (N/A, 04/12/2021); Cardiac catheterization (N/A, 04/23/2021); Cardiac catheterization (N/A, 04/30/2021); Cardiac catheterization (N/A, 04/30/2021); Esophagogastroduodenoscopy (N/A, 05/14/2021); Colonoscopy (N/A, 05/14/2021); Coronary artery bypass graft (N/A, 2013); Interventional radiology procedure (N/A, 10/21/2021); Cardiac catheterization (Left, 11/13/2021); Interventional radiology procedure (N/A, 01/27/2022); Esophagogastroduodenoscopy (N/A, 01/28/2022); Hysterectomy; Laparoscopic tubal ligation; and Tunneled venous catheter placement.    Family History: family history includes Diabetes in her father; Heart attack in her father, half-brother, and half-brother; Heart disease in her brother, father, half-sister, and mother; Lung cancer in her mother; No Known Problems in her brother, brother, maternal  grandfather, maternal grandmother, paternal grandfather, paternal grandmother, sister, sister, sister, sister, and sister; Pancreatic cancer in her half-sister.     Social History:  reports that she has been smoking cigarettes. She started smoking about 23 years ago. She has a 23.00 pack-year smoking history. She has never used smokeless tobacco. She reports that she does not currently use drugs after having used the following drugs: LSD, Marijuana, and Methamphetamines. She reports that she does not drink alcohol.    Allergies:   Allergies   Allergen Reactions   • Adhesive Tape Hives, Other (See Comments) and Rash   • Nsaids Hives   • Latex Other (See Comments) and Hives   • Other Itching     Bandaids, MRSA, SURECLOSE   • Wound Dressing Adhesive Hives and Rash       Medications:   Prior to Admission medications    Medication Sig Start Date End Date Taking? Authorizing Provider   albuterol (PROVENTIL) (2.5 MG/3ML) 0.083% nebulizer solution Inhale the contents of 1 vial by nebulization Every 4 (Four) Hours As Needed for Wheezing. 10/28/21  Yes Haily Mcneil MD   albuterol sulfate  (90 Base) MCG/ACT inhaler Inhale 2 puffs Every 4 (Four) Hours As Needed for Wheezing. 3/26/21  Yes Nirmal Calvillo MD   atorvastatin (LIPITOR) 20 MG tablet Take 1 tablet by mouth every night at bedtime. 4/26/22  Yes Kit Brar MD   Cetirizine HCl 10 MG capsule Take 1 capsule by mouth Daily. 11/4/21  Yes Caio Thompson MD   clopidogrel (PLAVIX) 75 MG tablet Take 1 tablet by mouth Daily. 3/26/21  Yes Nirmal Calvillo MD   CYCLOBENZAPRINE HCL PO Take 5 mg by mouth 2 (Two) Times a Day. 10/28/22  Yes Kristie Kern DPM   docusate sodium (COLACE) 100 MG capsule Take 100 mg by mouth 2 (Two) Times a Day. 1/1/21  Yes Caio Thompson MD   acetaminophen (Tylenol 8 Hour) 650 MG 8 hr tablet Take 1 tablet by mouth Every 8 (Eight) Hours As Needed for Mild Pain . 2/1/22   Blake Burris MD   Blood  Glucose Monitoring Suppl ([facility] Blood Glucose Meter) w/Device kit 1 each 3 (Three) Times a Day. 10/9/20   Nirmal Calvillo MD   Capsaicin 0.035 % cream Apply 3 g topically 3 (Three) Times a Day As Needed (ankle pain). 4/26/22   Kit Brar MD   Diclofenac Sodium (VOLTAREN) 1 % gel gel Apply 4 g topically to the appropriate area as directed 4 (Four) Times a Day As Needed (Ankle pain). 4/26/22   Kit Brar MD   DULoxetine (CYMBALTA) 20 MG capsule Take 20 mg by mouth Daily. Indications: Disease of the Peripheral Nerves 1/1/21   Caio Thompson MD   Easy Touch Insulin Syringe 30G X 5/16\" 0.5 ML misc USE AS DIRECTED WITH LEVEMIR 12/1/21   Caio Thompson MD   EASY TOUCH PEN NEEDLES 31G X 8 MM misc  8/7/20   Caio Thompson MD   gabapentin (NEURONTIN) 300 MG capsule Take 300 mg by mouth Daily. Repeat x 1 on M_W_F (dialysis days)    Caio Thompson MD   glucose blood test strip Use as instructed 10/9/20   Nirmal Calvillo MD   hydrOXYzine (ATARAX) 25 MG tablet Take 25 mg by mouth Every 8 (Eight) Hours As Needed for Itching. 1/1/21   Caio Thompson MD   insulin detemir (LEVEMIR) 100 UNIT/ML injection Inject 24 Units under the skin into the appropriate area as directed 2 (Two) Times a Day.    Caio Thompson MD   Insulin Lispro (humaLOG) 100 UNIT/ML injection Inject 14 Units under the skin into the appropriate area as directed 3 (Three) Times a Day Before Meals. In addition to sliding scale    Caio Thompson MD   Insulin Lispro, 1 Unit Dial, (HUMALOG) 100 UNIT/ML solution pen-injector Inject 12 Units under the skin into the appropriate area as directed 3 (Three) Times a Day With Meals.  Patient taking differently: Inject  under the skin into the appropriate area as directed 3 (Three) Times a Day With Meals. Takes 14 units with meals plus sliding scale  Sliding scale:   150-199 take 4 units  200-249 take 8 units  250-299 take 12 units  300-349 take  16 units  350-400 take 20 units  greater than 400, call physician 5/24/22   Blake Burris MD   Insulin Pen Needle (NovoFine) 30G X 8 MM misc As directed 4 times daily 3/26/21   Nirmal Calvillo MD   Insulin Pen Needle 31G X 8 MM misc Use to inject insulin 4 (Four) Times a Day as directed. 10/28/21   Haily Mcneil MD   ipratropium-albuterol (DUO-NEB) 0.5-2.5 mg/3 ml nebulizer Take 3 mL by nebulization 4 (Four) Times a Day As Needed. 3/22/17   Caio Thompson MD   losartan (COZAAR) 100 MG tablet Take 100 mg by mouth Daily. 10/28/22   Kristie Kern DPM   memantine (NAMENDA) 5 MG tablet Take 5 mg by mouth 2 (Two) Times a Day. 10/28/22   Kristie Kern DPM   Methoxy PEG-Epoetin Beta (MIRCERA IJ) 225 mcg Every 14 (Fourteen) Days. 3/14/22 3/13/23  Caio Thompson MD   montelukast (SINGULAIR) 10 MG tablet Take 1 tablet by mouth Every Night. 4/26/22   Kit Brar MD   nitroglycerin (NITROSTAT) 0.4 MG SL tablet Place 0.4 mg under the tongue Every 5 (Five) Minutes As Needed for Chest Pain (x 3 doses). 1/1/15   Caio Thompson MD   O2 (OXYGEN) Inhale 2 L/min Continuous. 1/1/21   Caio Thompson MD   ondansetron ODT (Zofran ODT) 4 MG disintegrating tablet Place 1 tablet on the tongue Every 8 (Eight) Hours As Needed for Nausea or Vomiting. 3/26/21   Nirmal Calvillo MD   oxyCODONE (ROXICODONE) 10 MG tablet Take 10 mg by mouth 4 (Four) Times a Day As Needed for Moderate Pain or Severe Pain. 11/4/22   Caio Thompson MD   pantoprazole (PROTONIX) 40 MG EC tablet Take 1 tablet by mouth Every Night. 4/26/22   Kit Brar MD   promethazine (PHENERGAN) 25 MG tablet Take 25 mg by mouth Every 6 (Six) Hours As Needed for Nausea or Vomiting. 11/4/22   Provider, MD Caio   sevelamer (RENVELA) 800 MG tablet Take 800 mg by mouth 3 (Three) Times a Day With Meals. 1/26/22   Provider, MD Caio   Vitamin D, Ergocalciferol, 69098 units capsule Take 50,000 Units  by mouth 1 (One) Time Per Week. Take on Wednesday  Indications: Kidney Failure Syndrome 1/1/21   Provider, MD Caio   insulin aspart (novoLOG FLEXPEN) 100 UNIT/ML solution pen-injector sc pen Inject 30 Units under the skin into the appropriate area as directed 3 (Three) Times a Day With Meals. 2/1/22 3/17/22  Blake Burris MD       Review of Systems:  Review of Systems   Constitutional: Positive for fatigue. Negative for chills and fever.   HENT: Negative.  Negative for congestion.    Eyes: Negative.    Respiratory: Negative.  Negative for cough and shortness of breath.    Cardiovascular: Negative.  Negative for chest pain and palpitations.   Gastrointestinal: Positive for nausea and vomiting. Negative for abdominal distention and abdominal pain.   Endocrine: Negative.    Genitourinary: Negative.    Musculoskeletal: Negative.         Left foot pain   Skin: Positive for wound.   Neurological: Negative.  Negative for dizziness and weakness.   Hematological: Negative.    Psychiatric/Behavioral: Negative.       Otherwise complete ROS is negative except as mentioned above.    Physical Exam:   Temp:  [96.3 °F (35.7 °C)] 96.3 °F (35.7 °C)  Heart Rate:  [72] 72  Resp:  [20] 20  BP: (157)/(73) 157/73  Physical Exam  Vitals and nursing note reviewed.   Constitutional:       Appearance: She is obese.   HENT:      Head: Normocephalic and atraumatic.      Right Ear: External ear normal.      Left Ear: External ear normal.      Nose: Nose normal.      Mouth/Throat:      Mouth: Mucous membranes are moist.      Pharynx: Oropharynx is clear.   Eyes:      General: No scleral icterus.     Extraocular Movements: Extraocular movements intact.      Conjunctiva/sclera: Conjunctivae normal.      Pupils: Pupils are equal, round, and reactive to light.   Cardiovascular:      Rate and Rhythm: Normal rate and regular rhythm.      Heart sounds: No murmur heard.  Pulmonary:      Effort: Pulmonary effort is normal.      Breath sounds:  Normal breath sounds.   Abdominal:      General: Bowel sounds are normal.      Palpations: Abdomen is soft.   Musculoskeletal:         General: Normal range of motion.      Cervical back: Normal range of motion and neck supple.      Left lower leg: Edema present.   Skin:     General: Skin is warm and dry.      Capillary Refill: Capillary refill takes less than 2 seconds.      Comments: Left foot bandage clean dry intact.  Right foot bandage clean dry intact.  Left foot is noted with transmetatarsal amputation.   Neurological:      General: No focal deficit present.      Mental Status: She is alert and oriented to person, place, and time.   Psychiatric:         Mood and Affect: Mood normal.         Behavior: Behavior normal.           Results Reviewed:  I have personally reviewed current lab, radiology, and data and agree with results.  Lab Results (last 24 hours)     Procedure Component Value Units Date/Time    Urine Drug Screen - Urine, Clean Catch [[id number]] Collected: 12/15/22 1721    Specimen: Urine, Clean Catch Updated: 12/15/22 1722    Hepatitis B Surface Antigen [[id number]] Collected: 12/15/22 1658    Specimen: Blood Updated: 12/15/22 1719    Lipid Panel [[id number]] Collected: 12/15/22 1658    Specimen: Blood Updated: 12/15/22 1719    Basic Metabolic Panel [[id number]] Collected: 12/15/22 1658    Specimen: Blood Updated: 12/15/22 1719    Hemoglobin A1c [[id number]] Collected: 12/15/22 1657    Specimen: Blood Updated: 12/15/22 1719    Vitamin D,25-Hydroxy [[id number]] Collected: 12/15/22 1658    Specimen: Blood Updated: 12/15/22 1719    POC Glucose Once [[id number]]  (Abnormal) Collected: 12/15/22 1657    Specimen: Blood Updated: 12/15/22 1714     Glucose 67 mg/dL      Comment: : [id number] ASCENCIONJONATHAN ANGELITAMeter ID: [id number]           Imaging Results (Last 24 Hours)     ** No results found for the last 24 hours. **            Assessment:    Active Hospital Problems    Diagnosis    • **Venous  insufficiency          Impression/plan  Left foot necrosis.  Place the patient on IV antibiotics.  Cefepime, vancomycin pharmacy to dose, and Flagyl.  Obtain blood cultures.  Continue follow labs.  CBC complete metabolic profile tonight and in a.m.  Check chest x-ray and EKG.  Pharmacy to dose vancomycin.  Consult Dr. White for surgical intervention.  Below-knee amputation.    End-stage renal disease.  Consult Dr. Gupta for arrangements for dialysis which is usually completed Monday Wednesday and Friday.    Insulin-dependent diabetes mellitus type 2.  We will check A1c.  Check TSH.  Place the patient on sliding scale and ADA diet n.p.o. after midnight.    Hyperlipidemia continue Lipitor.    Diabetic neuropathy continue Cymbalta and gabapentin    Hypertension continue losartan    Chronic pain continue oxycodone    GERD continue Protonix    Morbid obesity BMI is 47.  Continue nutritional support.    DVT prophylaxis Heparin    CODE STATUS full code      I confirmed that the patient's Advance Care Plan is present, code status is documented, or surrogate decision maker is listed in the patient's medical record.     I have utilized all available immediate resources to obtain, update, or review the patient's current medications.     I discussed the patient's findings and my recommendations with: The patient and her sister in detail.  They agree with current care plan.    Juvenal Roberson DO          Electronically signed by Juvenal Roberson DO at 12/15/22 7327       Emergency Department Notes    No notes of this type exist for this encounter.           Lab Results (last 24 hours)     Procedure Component Value Units Date/Time    POC Glucose Once [[id number]]  (Abnormal) Collected: 12/16/22 0656    Specimen: Blood Updated: 12/16/22 0707     Glucose 149 mg/dL      Comment: Result Not ConfirmedOperator: [id number] GILL IDAMeter ID: [id number]       CBC & Differential [[id number]]  (Abnormal) Collected: 12/16/22 0259     Specimen: Blood Updated: 12/16/22 0606    Narrative:      The following orders were created for panel order CBC & Differential.  Procedure                               Abnormality         Status                     ---------                               -----------         ------                     Manual Differential[[id number]]          Abnormal            Final result               CBC Auto Differential[[id number]]        Abnormal            Final result                 Please view results for these tests on the individual orders.    Manual Differential [[id number]]  (Abnormal) Collected: 12/16/22 0259    Specimen: Blood Updated: 12/16/22 0606     Neutrophil % 64.0 %      Lymphocyte % 19.0 %      Monocyte % 7.0 %      Eosinophil % 4.0 %      Atypical Lymphocyte % 6.0 %      Neutrophils Absolute 5.93 10*3/mm3      Lymphocytes Absolute 2.32 10*3/mm3      Monocytes Absolute 0.65 10*3/mm3      Eosinophils Absolute 0.37 10*3/mm3      RBC Morphology Normal     WBC Morphology Normal     Platelet Estimate Adequate    POC Glucose Once [[id number]]  (Normal) Collected: 12/16/22 0540    Specimen: Blood Updated: 12/16/22 0552     Glucose 91 mg/dL      Comment: : [id number] LAPINID IDAMeter ID: [id number]       CBC Auto Differential [[id number]]  (Abnormal) Collected: 12/16/22 0259    Specimen: Blood Updated: 12/16/22 0409     WBC 9.27 10*3/mm3      RBC 3.78 10*6/mm3      Hemoglobin 10.5 g/dL      Hematocrit 33.0 %      MCV 87.3 fL      MCH 27.8 pg      MCHC 31.8 g/dL      RDW 16.9 %      RDW-SD 54.0 fl      MPV 8.7 fL      Platelets 213 10*3/mm3     Comprehensive Metabolic Panel [[id number]]  (Abnormal) Collected: 12/16/22 0259    Specimen: Blood Updated: 12/16/22 0407     Glucose 100 mg/dL      BUN 72 mg/dL      Creatinine 6.27 mg/dL      Sodium 135 mmol/L      Potassium 7.7 mmol/L      Chloride 99 mmol/L      CO2 22.0 mmol/L      Calcium 9.2 mg/dL      Total Protein 7.8 g/dL      Albumin 3.10 g/dL      ALT (SGPT)  16 U/L      AST (SGOT) 23 U/L      Alkaline Phosphatase 297 U/L      Total Bilirubin 0.4 mg/dL      Globulin 4.7 gm/dL      A/G Ratio 0.7 g/dL      BUN/Creatinine Ratio 11.5     Anion Gap 14.0 mmol/L      eGFR 7.0 mL/min/1.73      Comment: <15 Indicative of kidney failure       Narrative:      GFR Normal >60  Chronic Kidney Disease <60  Kidney Failure <15      Phosphorus [[id number]]  (Abnormal) Collected: 12/16/22 0259    Specimen: Blood Updated: 12/16/22 0404     Phosphorus 8.9 mg/dL     Magnesium [[id number]]  (Normal) Collected: 12/16/22 0259    Specimen: Blood Updated: 12/16/22 0404     Magnesium 1.9 mg/dL     Vitamin D,25-Hydroxy [[id number]]  (Normal) Collected: 12/15/22 1658    Specimen: Blood Updated: 12/16/22 0209     25 Hydroxy, Vitamin D 37.6 ng/ml     Narrative:      Reference Range for Total Vitamin D 25(OH)     Deficiency <20.0 ng/mL   Insufficiency 21-29 ng/mL   Sufficiency  ng/mL  Toxicity >100 ng/ml    Results may be falsely increased if patient taking Biotin.      POC Glucose Once [[id number]]  (Abnormal) Collected: 12/16/22 0047    Specimen: Blood Updated: 12/16/22 0100     Glucose 162 mg/dL      Comment: RN NotifiedOperator: [id number] KIMBERLYN [facility] CenterLMeter ID: [id number]       Basic Metabolic Panel [[id number]]  (Abnormal) Collected: 12/15/22 2233    Specimen: Blood Updated: 12/15/22 2304     Glucose 121 mg/dL      BUN 75 mg/dL      Creatinine 5.74 mg/dL      Sodium 135 mmol/L      Potassium 8.3 mmol/L      Chloride 99 mmol/L      CO2 22.0 mmol/L      Calcium 9.4 mg/dL      BUN/Creatinine Ratio 13.1     Anion Gap 14.0 mmol/L      eGFR 7.8 mL/min/1.73      Comment: <15 Indicative of kidney failure       Narrative:      GFR Normal >60  Chronic Kidney Disease <60  Kidney Failure <15      POC Glucose Once [[id number]]  (Normal) Collected: 12/15/22 2015    Specimen: Blood Updated: 12/15/22 2058     Glucose 109 mg/dL      Comment: : [id number] KIMBERLYN OPALMeter ID: [id number]       Extra Tubes  [[id number]] Collected: 12/15/22 1923    Specimen: Blood, Venous Line Updated: 12/15/22 2032    Narrative:      The following orders were created for panel order Extra Tubes.  Procedure                               Abnormality         Status                     ---------                               -----------         ------                     Light Blue Top[[id number]]                                   Final result                 Please view results for these tests on the individual orders.    Light Blue Top [[id number]] Collected: 12/15/22 1923    Specimen: Blood Updated: 12/15/22 2032     Extra Tube Hold for add-ons.     Comment: Auto resulted       Blood Culture - Blood, Arm, Left [[id number]] Collected: 12/15/22 1818    Specimen: Blood from Arm, Left Updated: 12/15/22 1921    Blood Culture - Blood, Arm, Left [[id number]] Collected: 12/15/22 1818    Specimen: Blood from Arm, Left Updated: 12/15/22 1921    Basic Metabolic Panel [[id number]]  (Abnormal) Collected: 12/15/22 1658    Specimen: Blood Updated: 12/15/22 1832     Glucose 67 mg/dL      BUN 75 mg/dL      Creatinine 6.50 mg/dL      Sodium 138 mmol/L      Potassium 7.3 mmol/L      Comment: Slight hemolysis detected by analyzer. Results may be affected.        Chloride 100 mmol/L      CO2 18.0 mmol/L      Calcium 9.3 mg/dL      BUN/Creatinine Ratio 11.5     Anion Gap 20.0 mmol/L      eGFR 6.7 mL/min/1.73      Comment: <15 Indicative of kidney failure       Narrative:      GFR Normal >60  Chronic Kidney Disease <60  Kidney Failure <15      TSH [[id number]]  (Abnormal) Collected: 12/15/22 1658    Specimen: Blood Updated: 12/15/22 1815     TSH 8.680 uIU/mL     Lipid Panel [[id number]] Collected: 12/15/22 1658    Specimen: Blood Updated: 12/15/22 1809     Total Cholesterol 135 mg/dL      Triglycerides 88 mg/dL      HDL Cholesterol 53 mg/dL      LDL Cholesterol  65 mg/dL      VLDL Cholesterol 17 mg/dL      LDL/HDL Ratio 1.22    Narrative:      Cholesterol Reference  Ranges  (U.S. Department of Health and Human Services ATP III Classifications)    Desirable          <200 mg/dL  Borderline High    200-239 mg/dL  High Risk          >240 mg/dL      Triglyceride Reference Ranges  (U.S. Department of Health and Human Services ATP III Classifications)    Normal           <150 mg/dL  Borderline High  150-199 mg/dL  High             200-499 mg/dL  Very High        >500 mg/dL    HDL Reference Ranges  (U.S. Department of Health and Human Services ATP III Classifications)    Low     <40 mg/dl (major risk factor for CHD)  High    >60 mg/dl ('negative' risk factor for CHD)        LDL Reference Ranges  ([place] Department of Health and Human Services ATP III Classifications)    Optimal          <100 mg/dL  Near Optimal     100-129 mg/dL  Borderline High  130-159 mg/dL  High             160-189 mg/dL  Very High        >189 mg/dL    Hepatitis B Surface Antigen [[id number]]  (Normal) Collected: 12/15/22 1658    Specimen: Blood Updated: 12/15/22 1757     Hepatitis B Surface Ag Non-Reactive    Hemoglobin A1c [[id number]]  (Normal) Collected: 12/15/22 1657    Specimen: Blood Updated: 12/15/22 1733     Hemoglobin A1C 5.30 %     Narrative:      Hemoglobin A1C Ranges:    Increased Risk for Diabetes  5.7% to 6.4%  Diabetes                     >= 6.5%  Diabetic Goal                < 7.0%    Urine Drug Screen - Urine, Clean Catch [[id number]]  (Abnormal) Collected: 12/15/22 1721    Specimen: Urine, Clean Catch Updated: 12/15/22 1733     THC, Screen, Urine Negative     Phencyclidine (PCP), Urine Negative     Cocaine Screen, Urine Negative     Methamphetamine, Ur Negative     Opiate Screen Negative     Amphetamine Screen, Urine Negative     Benzodiazepine Screen, Urine Negative     Tricyclic Antidepressants Screen Negative     Methadone Screen, Urine Negative     Barbiturates Screen, Urine Negative     Oxycodone Screen, Urine Positive     Propoxyphene Screen Negative     Buprenorphine, Screen, Urine Negative     Narrative:      Cutoff For Drugs Screened:    Amphetamines               500 ng/ml  Barbiturates               200 ng/ml  Benzodiazepines            150 ng/ml  Cocaine                    150 ng/ml  Methadone                  200 ng/ml  Opiates                    100 ng/ml  Phencyclidine               25 ng/ml  THC                            50 ng/ml  Methamphetamine            500 ng/ml  Tricyclic Antidepressants  300 ng/ml  Oxycodone                  100 ng/ml  Propoxyphene               300 ng/ml  Buprenorphine               10 ng/ml    The normal value for all drugs tested is negative. This report includes unconfirmed screening results, with the cutoff values listed, to be used for medical treatment purposes only.  Unconfirmed results must not be used for non-medical purposes such as employment or legal testing.  Clinical consideration should be applied to any drug of abuse test, particularly when unconfirmed results are used.      POC Glucose Once [[id number]]  (Abnormal) Collected: 12/15/22 1657    Specimen: Blood Updated: 12/15/22 1714     Glucose 67 mg/dL      Comment: : [id number] MINERVA ANGELITAMeter ID: [id number]           Imaging Results (Last 24 Hours)     ** No results found for the last 24 hours. **        ECG/EMG Results (last 24 hours)     Procedure Component Value Units Date/Time    ECG 12 Lead Electrolyte Imbalance [[id number]] Collected: 12/16/22 0432     Updated: 12/16/22 0648     QT Interval 420 ms      QTC Interval 469 ms     Narrative:      Test Reason : Electrolyte Imbalance  Blood Pressure :   */*   mmHG  Vent. Rate :  75 BPM     Atrial Rate :  75 BPM     P-R Int : 200 ms          QRS Dur : 134 ms      QT Int : 420 ms       P-R-T Axes :  68 217  52 degrees     QTc Int : 469 ms    Normal sinus rhythm  Right superior axis deviation  Nonspecific intraventricular block  Abnormal ECG  When compared with ECG of 16-DEC-2022 00:19,  No significant change was found    Referred By:             Confirmed By:     ECG 12 Lead Electrolyte Imbalance [[id number]] Collected: 22     Updated: 22     QT Interval 440 ms      QTC Interval 471 ms     Narrative:      Test Reason : Electrolyte Imbalance  Blood Pressure :   */*   mmHG  Vent. Rate :  69 BPM     Atrial Rate :  69 BPM     P-R Int : 216 ms          QRS Dur : 148 ms      QT Int : 440 ms       P-R-T Axes :  46 160  25 degrees     QTc Int : 471 ms    Sinus rhythm with 1st degree AV block  Right axis deviation  Nonspecific intraventricular block  Abnormal ECG  When compared with ECG of 2022 15:35,  GA interval has increased  QRS axis Shifted right  T wave inversion no longer evident in Anterior leads  QT has shortened    Referred By:            Confirmed By:              Physician Progress Notes (last 24 hours)      Huy White MD at 22          ORTHOPEDIC PROGRESS NOTE:    Name:  Yamileth Slater  Date:    2022  Date of admission:  12/15/2022      Subjective:  Events of last night noted.  No fever or chills  Has had hyperkalemia, responding somewhat to medical treatment, heading to dialysis in about 30 minutes.    Vitals:     Vitals:    22 0600   BP: 101/59   Pulse: 81   Resp:    Temp:    SpO2: 96%      Temp (24hrs), Av.9 °F (36.1 °C), Min:96.3 °F (35.7 °C), Max:97.9 °F (36.6 °C)      Exam:  No changes in ortho exam.  Open transmetatarsal amputation site has exudate around the edges and mild necrosis along the flaps.  Metatarsals noted 1,2,3,4.  Mild erythema in foot and distal tibia region.  No active drainage.    ASSESSMENT:  Active Hospital Problems    Diagnosis  POA   • **Venous insufficiency [I87.2]  Yes         PLAN:    Patient scheduled for BKA left today.  Hyperkalemia obviously an issue.  Will see how it responds to dialysis.  Patient will never be a \"good\" surgery candidate, however, will discuss with nephrology and anesthesia to see if she can be optimized a little better after  dialysis today.  Otherwise, will proceed with BKA and remove the necrotic tissue which will also help overall medical condition.    All questions answered.      12/16/22 at 06:33 CST by Huy White MD          Electronically signed by Huy White MD at 12/16/22 0640     Progress Notes signed by Aman Yusuf MD at 12/16/22 0251         Nurse Practitioner - Brief Progress Note  PERMANENT  12/16/2022 02:49    [facility] - CCU/SD - 20 - [place], [place] ([facility])    DALE FINK    Date of Service 12/16/2022 02:49    HPI/Events of Note [facility] Provider Assessment Note      63 yo female admitted to SDU for hyperkalemia.  Also noted to have L foot gangrene and sent for admission by Orthopedic Surgery, pending BKA.  K 7.3 @ 1658 and K 8.3 @ 2233.        K treated with hyperkalemia protocol.  Repeat K pending.  Nephrology consulted for HD in AM.  Strict i/o and avoid nephrotoxins and contrast as possible.  Renally dose meds.  Monitor for ectopy.    Blood cultures pending.  Empiric Vanc, Cefepime, and Metronidazole.    **Discussed case with bedside RN.  As noted above, repeat K is pending.  If increasing, advised RN to notify Nephrology and Surgery.  Suspect gangrenous foot as etiology for difficulty controlling potassium level.          PMH: CAD, CHF, HTN, HLD, CABG, PVD, Carotid Stenosis s/p Stent, Morbid Obesity, Sleep Apnea, COPD, ESRD on HD, DM II, Smoking 23 pack years    Vitals: T 96.8, HR 73, BP 96/54 (72), RR 17, Sat 97%      __X___   Video Assessment performed   __X___   Most recent labs reviewed  __X___   Vital Signs reviewed  __X___   Best Practices addressed:                 VTE prophylaxis: Hep SC                 SUP (when indicated): Protonix                 Glycemic control: SSI                      Please notify bedside physician when present or Printed Piece if glc > 180 X 2                 Sepsis guidelines: as  above                 Lung protective strategy:     __X___     Spoke with bedside RN  _____     Orders written    Note drafted by PURA Contreras  Contact [facility] for any needs if bedside physician is not present.      Interventions Major-Electrolyte abnormality - evaluation and management, Sepsis - evaluation and management, Other: Gangrenous L Foot  Intermediate-Communication with other healthcare providers and/or family        Electronically Signed by: Rakesh Shields (NP) on 2022 02:50    Annotated By: Aman Yusuf)    Date: 22 03:51  Note reviewed and video assessment done    Electronically signed by Aman Yusuf MD at 22 0251     Nestor Richards MD at 12/15/22 1844     Summary:Overnight cross cover note             Noted that patient has potassium level 7.3.  Immediately ordered 1 amp D50 then 5 units regular insulin IV x1.  Also ordered 50 mill equivalent sodium bicarb IVP x2.  Also ordered Lokelma x1 dose.  We will recheck potassium level at 2300 and in a.m to assure further correction not required.  We will also give 1 amp calcium gluconate to assure stabilization of cardiac membranes.    Electronically signed by Nestor Richards MD at 12/15/22 1847       Medical Student Notes (last 24 hours)  Notes from 12/15/22 1011 through 22 1011   No notes of this type exist for this encounter.            Consult Notes (last 24 hours)      Ace Lauren MD at 22 0838      Consult Orders    1. Inpatient Nephrology Consult [[id number]] ordered by Juvenal Roberson DO at 12/15/22 1731                   [facility]  [address] [place] [address]. [address]  T - [phone]  F - [phone]     Consultation         PATIENT  DEMOGRAPHICS   PATIENT NAME: Yamileth Slater                      PHYSICIAN: Ace Lauren MD  : 1960  MRN: [id number]    Subjective    SUBJECTIVE   Referring Provider: Dr Roberson  Reason for  Consultation: esrd  History of present illness:      Ms. Slater is well-known to our group for end-stage kidney disease and is currently on intermittent dialysis in [place] under .  She has history of diabetes mellitus type 2 chronic hypoxic respiratory failure on home oxygen coronary artery disease and wounds in both extremities.  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  She has poor blood flow below the left knee and due to nonhealing of the wound plan for left below-knee amputation.  Patient is therefore admitted yesterday for surgery this afternoon    On admission she has elevated potassium between 7-8.  This has been corrected multiple times overnight.  I have discussed the case with Dr. Lim overnight.  We have arranged hemodialysis urgently this morning.  Patient has missed her treatment on Monday and Wednesday but also she is confused during my history and exam.  She denies any marked shortness of air.    Past Medical History:   Diagnosis Date   • Acute blood loss anemia 04/16/2017    Likely due to gastric oozing at this time. - Dr. Duarte (GI) was consulted and has now signed off, will follow up outpatient - pill colonoscopy showed AVMs - continue to monitor   • Acute cystitis with hematuria 03/31/2021 1/13: IV Rocephin 1 gm q 24 1/14 : transitioned  to omnicef 300mg. Urine cultures resulted and did not show growth. Omnicef discontinued as patient is asymptomatic   • Altered mental status 01/09/2022    - AMS on presentation - initial ABG pH 7.3, CO2 34 - Procal 0.29 - UA negative for acute cysitits -CTA head wnl  - Empiric Zosyn and Vancomycin -Lactate 2.5 on admission  - blood cultures no growth at 24 hours     • Anxiety    • CAD (coronary artery disease) 04/24/2021    S/P 3 stents 5/1/2021 for [facility] Continue ASA 81mg & Clopidogrel 75mg Continue Atorvastatin 40mg   • Carotid artery stenosis    • CHF (congestive heart  failure) ([facility])    • Chronic obstructive lung disease ([facility])    • CKD (chronic kidney disease) stage 4, GFR 15-29 ml/min ([facility])    • CKD (chronic kidney disease), symptom management only, stage 5 ([facility]) 10/05/2020    Results from last 7 days Lab Units 12/15/21 0548 12/14/21 1323 12/14/21 0916 CREATININE mg/dL 3.92* 3.21* 3.32*  Baseline creatinine 2-3 GFR 13-25 GFR 15 Dialysis MWF, sees Dr. Lauren Nephrology consult,, appreciate recommendations Continue Bumex 1mg bid daily Holding Bumex 2mg 4 times a week   • Colonic polyp    • Coronary arteriosclerosis    • Diabetes mellitus ([facility])    • Diabetic neuropathy ([facility])    • Ear pain, right 10/18/2021    - canal trauma due to patient scratching and DMT2 - added cortisporin ear drops   • Elevated troponin 10/12/2021    -most likely from CKD -Trending down -Neg chest pain   • Generalized abdominal pain 07/01/2022    Could be due to initiation of tube feeds vs dyspepsia vs abdominal cramps related to no PO intake due to intubation vs constipation Continue current laxative regiment  If no bowel movement by this afternoon will consider enema   • GERD (gastroesophageal reflux disease)    • GI bleed 05/13/2021    - GI will follow up outpatient - Protonix 40mg daily - Avoid medical DVT prophy and use mechanical at this time instead. - Continue to monitor - pill colonoscopy results showed AVMs   • History of transfusion    • Hypercholesterolemia    • Hyperosmolar hyperglycemic state (HHS) ([facility]) 06/25/2022    Serum glucose 605 on admission  Anion gap 16 PH 7.37 Bicarb 27.9 Continue fluids  Insulin drip with HHS protocol  Anion gap closed around 10 AM, received one dose of Levamir subq, will stop insulin drip after 2 hrs     • Hypertension    • Hypokalemia 05/27/2022    Will replace as needed. Will be cautious in the setting of ESRD to avoid need for emergency dialysis   Monitor Qtc intervals on EKG     • Hypomagnesemia 06/27/2021    Monitor and replace   • Morbid obesity ([facility])    •  Nephrolithiasis    • On mechanically assisted ventilation ([facility]) 06/26/2022    Vent management and sedation orders placed.  - [facility] intensivist group consulted for vent management appreciate recommendations  - plan to extubate today     • Peripheral vascular disease ([facility])    • Pleural effusion on right 06/26/2022    CXR on 6/30/22 read as a small upper left pulmonary edema vs early pneumonia.  Last Echo 1/2022 EF 61-65 % Continue to monitor  Procal slightly improved, CRP improved On Linezolid and meropenum      • PVD (peripheral vascular disease) ([facility])    • SIRS (systemic inflammatory response syndrome) ([facility]) 01/09/2022    Admission  - WBC 17.78   -   - RR 16 - 1/10: VSS/wnl - CXR - Mild pulm edema - Blood cultures no growth at 24 hours  - Procalcitonin 0.29 - UA : glucose 1000, negative Leucocytes/nitrate - Empiric Zosyn and Vancomcyin    • Sleep apnea    • Substance abuse ([facility])    • Vitamin D deficiency      Past Surgical History:   Procedure Laterality Date   • CARDIAC CATHETERIZATION N/A 07/14/2020   • CARDIAC CATHETERIZATION N/A 04/23/2021    Procedure: Left Heart Cath;  Surgeon: Melba Romo MD;  Location: [facility] CATH INVASIVE LOCATION;  Service: Cardiology;  Laterality: N/A;   • CARDIAC CATHETERIZATION N/A 04/30/2021    Procedure: Percutaneous Coronary Intervention;  Surgeon: Russell Voss MD;  Location: [facility] CATH INVASIVE LOCATION;  Service: Cardiovascular;  Laterality: N/A;   • CARDIAC CATHETERIZATION N/A 04/30/2021    Procedure: Stent NIKKI coronary;  Surgeon: Russell Voss MD;  Location: [facility] CATH INVASIVE LOCATION;  Service: Cardiovascular;  Laterality: N/A;   • CARDIAC CATHETERIZATION Left 11/13/2021    Procedure: Left Heart Cath;  Surgeon: Niall Rios MD;  Location: [facility] CATH INVASIVE LOCATION;  Service: Cardiology;  Laterality: Left;   • CAROTID STENT Left    • COLONOSCOPY     • COLONOSCOPY N/A 05/14/2021    Procedure: COLONOSCOPY;  Surgeon: Felicia  MD Mingo;  Location: [facility] ENDOSCOPY;  Service: Gastroenterology;  Laterality: N/A;   • CORONARY ARTERY BYPASS GRAFT N/A 2013    CABG X 3   • CYSTOSCOPY BLADDER STONE LITHOTRIPSY Bilateral    • ENDOSCOPY N/A 04/12/2021    Procedure: ESOPHAGOGASTRODUODENOSCOPY;  Surgeon: Mnigo Duarte MD;  Location: [facility] ENDOSCOPY;  Service: Gastroenterology;  Laterality: N/A;   • ENDOSCOPY N/A 05/14/2021    Procedure: ESOPHAGOGASTRODUODENOSCOPY;  Surgeon: [place] Duarte MD;  Location: [facility] ENDOSCOPY;  Service: Gastroenterology;  Laterality: N/A;   • ENDOSCOPY N/A 01/28/2022    Procedure: ESOPHAGOGASTRODUODENOSCOPY;  Surgeon: [place] Duarte MD;  Location: [facility] ENDOSCOPY;  Service: Gastroenterology;  Laterality: N/A;   • HYSTERECTOMY     • INTERVENTIONAL RADIOLOGY PROCEDURE N/A 10/21/2021    Procedure: tunneled central venous catheter placement;  Surgeon: Donnie Robles MD;  Location: [facility] ANGIO INVASIVE LOCATION;  Service: Interventional Radiology;  Laterality: N/A;   • INTERVENTIONAL RADIOLOGY PROCEDURE N/A 01/27/2022    Procedure: tunneled central venous catheter placement;  Surgeon: Donnie Robles MD;  Location: [facility] ANGIO INVASIVE LOCATION;  Service: Interventional Radiology;  Laterality: N/A;   • LAPAROSCOPIC TUBAL LIGATION     • TUNNELED VENOUS CATHETER PLACEMENT       Family History   Problem Relation Age of Onset   • Heart disease Mother    • Lung cancer Mother    • Heart disease Father    • Heart attack Father    • Diabetes Father    • Heart disease Half-Sister         Dad's side   • Heart disease Brother    • No Known Problems Sister    • No Known Problems Sister    • No Known Problems Sister    • No Known Problems Sister    • No Known Problems Sister    • Pancreatic cancer Half-Sister         Dad's side   • No Known Problems Brother    • No Known Problems Brother    • Heart attack Half-Brother    • Heart attack Half-Brother    • No Known Problems Maternal Grandmother    • No  Known Problems Maternal Grandfather    • No Known Problems Paternal Grandmother    • No Known Problems Paternal Grandfather      Social History     Tobacco Use   • Smoking status: Every Day     Packs/day: 0.50     Years: 46.00     Pack years: 23.00     Types: Cigarettes     Start date: 2/1/1999   • Smokeless tobacco: Never   Vaping Use   • Vaping Use: Never used   Substance Use Topics   • Alcohol use: No   • Drug use: Not Currently     Types: LSD, Marijuana, Methamphetamines     Allergies:  Adhesive tape, Nsaids, Latex, Other, and Wound dressing adhesive     REVIEW OF SYSTEMS    Review of Systems   Unable to perform ROS: Acuity of condition       Objective    OBJECTIVE   Vital Signs  Temp:  [96.3 °F (35.7 °C)-98.2 °F (36.8 °C)] 98.2 °F (36.8 °C)  Heart Rate:  [70-88] 84  Resp:  [12-21] 14  BP: ()/(51-73) 113/65    Flowsheet Rows    Flowsheet Row First Filed Value   Admission Height 157.5 cm (62\") Documented at 12/15/2022 1700   Admission Weight 124 kg (273 lb 4.8 oz) Documented at 12/15/2022 1700           I/O last 3 completed shifts:  In: -   Out: 600 [Urine:300; Stool:300]    PHYSICAL EXAM    Physical Exam  Constitutional:       Appearance: She is well-developed.   HENT:      Head: Normocephalic.   Eyes:      Pupils: Pupils are equal, round, and reactive to light.   Cardiovascular:      Rate and Rhythm: Normal rate and regular rhythm.      Heart sounds: Normal heart sounds.   Pulmonary:      Effort: Pulmonary effort is normal.      Breath sounds: Normal breath sounds.   Abdominal:      General: Bowel sounds are normal.      Palpations: Abdomen is soft.   Musculoskeletal:         General: No swelling.   Skin:     Coloration: Skin is not jaundiced.   Neurological:      General: No focal deficit present.      Mental Status: She is alert. She is disoriented.         RESULTS   Results Review:    Results from last 7 days   Lab Units 12/16/22  0259 12/15/22  2233 12/15/22  1658   SODIUM mmol/L 135* 135* 138    POTASSIUM mmol/L 7.7* 8.3* 7.3*   CHLORIDE mmol/L 99 99 100   CO2 mmol/L 22.0 22.0 18.0*   BUN mg/dL 72* 75* 75*   CREATININE mg/dL 6.27* 5.74* 6.50*   CALCIUM mg/dL 9.2 9.4 9.3   BILIRUBIN mg/dL 0.4  --   --    ALK PHOS U/L 297*  --   --    ALT (SGPT) U/L 16  --   --    AST (SGOT) U/L 23  --   --    GLUCOSE mg/dL 100* 121* 67       Estimated Creatinine Clearance: 11.7 mL/min (A) (by C-G formula based on SCr of 6.27 mg/dL (H)).    Results from last 7 days   Lab Units 12/16/22  0259   MAGNESIUM mg/dL 1.9   PHOSPHORUS mg/dL 8.9*             Results from last 7 days   Lab Units 12/16/22  0259   WBC 10*3/mm3 9.27   HEMOGLOBIN g/dL 10.5*   PLATELETS 10*3/mm3 213              MEDICATIONS    atorvastatin, 20 mg, Oral, Nightly  ceFAZolin, 2 g, Intravenous, Once  cefepime, 1 g, Intravenous, Q24H  DULoxetine, 20 mg, Oral, Daily  epoetin hubert/hubert-epbx, 10,000 Units, Intravenous, Once per day on Mon Wed Fri  furosemide, 100 mg, Intravenous, Once  gabapentin, 300 mg, Oral, Once per day on Mon Wed Fri   And  gabapentin, 300 mg, Oral, 2 times per day on Sun Tue Thu Sat  heparin (porcine), 5,000 Units, Subcutaneous, Q8H  Insulin Aspart, 0-9 Units, Subcutaneous, TID AC  losartan, 100 mg, Oral, Daily  memantine, 5 mg, Oral, BID  metroNIDAZOLE, 500 mg, Intravenous, Q8H  pantoprazole, 40 mg, Oral, Nightly  sevelamer, 800 mg, Oral, TID With Meals  sodium chloride, 10 mL, Intravenous, Q12H      O2, 2 L/min  Pharmacy to dose vancomycin,   sodium chloride, 1,000 mL      Medications Prior to Admission   Medication Sig Dispense Refill Last Dose   • albuterol (PROVENTIL) (2.5 MG/3ML) 0.083% nebulizer solution Inhale the contents of 1 vial by nebulization Every 4 (Four) Hours As Needed for Wheezing. 75 mL 3 Past Week   • albuterol sulfate  (90 Base) MCG/ACT inhaler Inhale 2 puffs Every 4 (Four) Hours As Needed for Wheezing. 18 g 1 Past Week   • atorvastatin (LIPITOR) 20 MG tablet Take 1 tablet by mouth every night at bedtime. 90  tablet 0 12/14/2022   • Cetirizine HCl 10 MG capsule Take 1 capsule by mouth Daily.   12/14/2022   • clopidogrel (PLAVIX) 75 MG tablet Take 1 tablet by mouth Daily. 30 tablet 5 12/14/2022   • CYCLOBENZAPRINE HCL PO Take 5 mg by mouth 2 (Two) Times a Day.   12/15/2022   • docusate sodium (COLACE) 100 MG capsule Take 100 mg by mouth 2 (Two) Times a Day.   12/15/2022   • acetaminophen (Tylenol 8 Hour) 650 MG 8 hr tablet Take 1 tablet by mouth Every 8 (Eight) Hours As Needed for Mild Pain . 90 tablet 0 Unknown   • Blood Glucose Monitoring Suppl ([employer] Blood Glucose Meter) w/Device kit 1 each 3 (Three) Times a Day. 1 kit 3    • Capsaicin 0.035 % cream Apply 3 g topically 3 (Three) Times a Day As Needed (ankle pain). 42.5 g 2    • Diclofenac Sodium (VOLTAREN) 1 % gel gel Apply 4 g topically to the appropriate area as directed 4 (Four) Times a Day As Needed (Ankle pain). 350 g 2    • DULoxetine (CYMBALTA) 20 MG capsule Take 20 mg by mouth Daily. Indications: Disease of the Peripheral Nerves      • Easy Touch Insulin Syringe 30G X 5/16\" 0.5 ML misc USE AS DIRECTED WITH RUCHI      • EASY TOUCH PEN NEEDLES 31G X 8 MM misc       • glucose blood test strip Use as instructed 100 each 12    • hydrOXYzine (ATARAX) 25 MG tablet Take 25 mg by mouth Every 8 (Eight) Hours As Needed for Itching.      • Insulin Lispro (humaLOG) 100 UNIT/ML injection Inject 14 Units under the skin into the appropriate area as directed 3 (Three) Times a Day Before Meals. In addition to sliding scale      • Insulin Lispro, 1 Unit Dial, (HUMALOG) 100 UNIT/ML solution pen-injector Inject 12 Units under the skin into the appropriate area as directed 3 (Three) Times a Day With Meals. (Patient taking differently: Inject  under the skin into the appropriate area as directed 3 (Three) Times a Day With Meals. Takes 14 units with meals plus sliding scale  Sliding scale:   150-199 take 4 units  200-249 take 8 units  250-299 take 12 units  300-349 take 16  units  350-400 take 20 units  greater than 400, call physician) 30 mL 12    • Insulin Pen Needle (NovoFine) 30G X 8 MM misc As directed 4 times daily 100 each 11    • Insulin Pen Needle 31G X 8 MM misc Use to inject insulin 4 (Four) Times a Day as directed. 120 each 12    • ipratropium-albuterol (DUO-NEB) 0.5-2.5 mg/3 ml nebulizer Take 3 mL by nebulization 4 (Four) Times a Day As Needed.      • losartan (COZAAR) 100 MG tablet Take 100 mg by mouth Daily.      • memantine (NAMENDA) 5 MG tablet Take 5 mg by mouth 2 (Two) Times a Day.      • Methoxy PEG-Epoetin Beta (MIRCERA IJ) 225 mcg Every 14 (Fourteen) Days.      • montelukast (SINGULAIR) 10 MG tablet Take 1 tablet by mouth Every Night. 90 tablet 0    • nitroglycerin (NITROSTAT) 0.4 MG SL tablet Place 0.4 mg under the tongue Every 5 (Five) Minutes As Needed for Chest Pain (x 3 doses).      • O2 (OXYGEN) Inhale 2 L/min Continuous.      • ondansetron ODT (Zofran ODT) 4 MG disintegrating tablet Place 1 tablet on the tongue Every 8 (Eight) Hours As Needed for Nausea or Vomiting. 30 tablet 0    • oxyCODONE (ROXICODONE) 10 MG tablet Take 10 mg by mouth 4 (Four) Times a Day As Needed for Moderate Pain or Severe Pain.      • pantoprazole (PROTONIX) 40 MG EC tablet Take 1 tablet by mouth Every Night. 90 tablet 0    • promethazine (PHENERGAN) 25 MG tablet Take 25 mg by mouth Every 6 (Six) Hours As Needed for Nausea or Vomiting.      • sevelamer (RENVELA) 800 MG tablet Take 800 mg by mouth 3 (Three) Times a Day With Meals.      • Vitamin D, Ergocalciferol, 97716 units capsule Take 50,000 Units by mouth 1 (One) Time Per Week. 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  She is currently dialyzing at [facility]. her current access is tunneled catheter.  She has prior right upper extremity failed AV fistula.  We have arrange urgent dialysis early this morning and try to do 1K bath for 2 hours and 2K bath for the 1 and 1/2 hours.  She also received medical treatment earlier in the morning.  I will repeat the potassium at 1 PM to give some time for electrolyte shift  after dialysis.  Plan for surgery later this afternoon    2- anemia chronic kidney disease patient has history of B12 deficiency and AV malformation.  hemoglobin is more than 10    3.diabetes mellitus type 2 with nonhealing wound on the left foot plan for left BKA    4. CAD    5. ckd mbd on sevelamer in out patient setting    6. htn stable on losartan    Thank you for the consult.  We will continue to follow the patient during their hospital stay.       I discussed the patients findings and my recommendations with patient and nursing staff             This document has been electronically signed by Ace Lauren MD on December 16, 2022 09:48 CST             Electronically signed by Ace Lauren MD at 12/16/22 1004

## 2022-12-16 NOTE — PROGRESS NOTES
Pharmacokinetics by Pharmacy - Vancomycin Initial Consult    Yamileth Slater is a 62 y.o. female being initiated on vancomycin for skin and soft tissue infection. Patient is also receiving cefepime and metronidazole.    Objective:     [Ht: 157.5 cm (62.01\"); Wt: 126 kg (277 lb 12.5 oz)]     WBC   Date Value Ref Range Status   12/16/2022 9.27 3.40 - 10.80 10*3/mm3 Final    No results found for: CRP, LACTATE   Temp Readings from Last 1 Encounters:   12/16/22 98.4 °F (36.9 °C) (Oral)     Estimated Creatinine Clearance: 11.7 mL/min (A) (by C-G formula based on SCr of 6.27 mg/dL (H)).   Creatinine   Date Value Ref Range Status   12/16/2022 6.27 (H) 0.57 - 1.00 mg/dL Final   12/15/2022 5.74 (H) 0.57 - 1.00 mg/dL Final   12/15/2022 6.50 (H) 0.57 - 1.00 mg/dL Final       Baseline culture results:  Microbiology Results (last 10 days)     ** No results found for the last 240 hours. **        No results found for: RESPCX    Assessment    WBC 9.27, WNL  Scr 6.27 (on MWF hemodialysis)  Patient is afebrile    Patient received a one time loading dose of vancomycin 2500 mg IV on 12/15 at 2024. Vancomycin level resulted at 22.2 and was drawn after dialysis was completed today.    BKA planned for today.     Plan  1. No additional dose needed today. Will continue vancomycin pulse dose regimen. Consult ends 12/22  2. Will order vancomycin random level on 12/17 at 0600 to ensure level therapeutic following BKA.  3. Pharmacy will monitor renal function and adjust dose accordingly.    Jf Pryor [facility]  12/16/22 14:34 CST

## 2022-12-16 NOTE — PLAN OF CARE
Goal Outcome Evaluation:  Plan of Care Reviewed With: caregiver        Progress: no change  Outcome Evaluation: Nutrition New Assessment:  pt is currently NPO awaiting surgery, BKA.  Hx of DM and ESRD on hemodialysis.  RD will [facility] xourse and make recommendations as appropriate.

## 2022-12-16 NOTE — ANESTHESIA PROCEDURE NOTES
Airway  Urgency: elective    Date/Time: 12/16/2022 4:22 PM  End Time:12/16/2022 4:22 PM    General Information and Staff    Patient location during procedure: OR    Indications and Patient Condition  Indications for airway management: airway protection    Preoxygenated: yes  MILS maintained throughout  Mask difficulty assessment: 0 - not attempted    Final Airway Details  Final airway type: supraglottic airway      Successful airway: LMA  Size 3     Number of attempts at approach: 1  Assessment: lips, teeth, and gum same as pre-op and atraumatic intubation

## 2022-12-16 NOTE — PLAN OF CARE
Problem: Adult Inpatient Plan of Care  Goal: Plan of Care Review  Outcome: Ongoing, Progressing  Flowsheets (Taken 12/16/2022 0210)  Progress: no change  Plan of Care Reviewed With: patient  Goal: Patient-Specific Goal (Individualized)  Outcome: Ongoing, Progressing  Goal: Absence of Hospital-Acquired Illness or Injury  Outcome: Ongoing, Progressing  Intervention: Identify and Manage Fall Risk  Recent Flowsheet Documentation  Taken 12/16/2022 0120 by Chery Maddox RN  Safety Promotion/Fall Prevention:   toileting scheduled   fall prevention program maintained   lighting adjusted  Intervention: Prevent Skin Injury  Recent Flowsheet Documentation  Taken 12/16/2022 0120 by Chery Maddox RN  Body Position:   turned   weight shifting   lower extremity elevated  Intervention: Prevent and Manage VTE (Venous Thromboembolism) Risk  Recent Flowsheet Documentation  Taken 12/16/2022 0120 by Chery Maddox RN  Activity Management: activity adjusted per tolerance  Intervention: Prevent Infection  Recent Flowsheet Documentation  Taken 12/16/2022 0120 by Chery Maddox RN  Infection Prevention:   hand hygiene promoted   single patient room provided   rest/sleep promoted  Goal: Optimal Comfort and Wellbeing  Outcome: Ongoing, Progressing  Intervention: Provide Person-Centered Care  Recent Flowsheet Documentation  Taken 12/16/2022 0120 by Chery Maddox RN  Trust Relationship/Rapport:   care explained   questions encouraged  Goal: Readiness for Transition of Care  Outcome: Ongoing, Progressing     Problem: Infection  Goal: Absence of Infection Signs and Symptoms  Outcome: Ongoing, Progressing  Intervention: Prevent or Manage Infection  Recent Flowsheet Documentation  Taken 12/16/2022 0120 by Chery Maddox RN  Isolation Precautions:   contact   precautions initiated     Problem: Fall Injury Risk  Goal: Absence of Fall and Fall-Related Injury  Outcome: Ongoing, Progressing  Intervention: Promote Injury-Free Environment  Recent Flowsheet  Documentation  Taken 12/16/2022 0120 by Chery Maddox RN  Safety Promotion/Fall Prevention:   toileting scheduled   fall prevention program maintained   lighting adjusted     Problem: COPD (Chronic Obstructive Pulmonary Disease) Comorbidity  Goal: Maintenance of COPD Symptom Control  Outcome: Ongoing, Progressing  Intervention: Maintain COPD-Symptom Control  Recent Flowsheet Documentation  Taken 12/16/2022 0120 by Chery Maddox RN  Supportive Measures:   self-care encouraged   active listening utilized     Problem: Diabetes Comorbidity  Goal: Blood Glucose Level Within Targeted Range  Outcome: Ongoing, Progressing  Intervention: Monitor and Manage Glycemia  Recent Flowsheet Documentation  Taken 12/16/2022 0120 by Chery Maddox RN  Glycemic Management: blood glucose monitored     Problem: Hypertension Comorbidity  Goal: Blood Pressure in Desired Range  Outcome: Ongoing, Progressing     Problem: Obstructive Sleep Apnea Risk or Actual Comorbidity Management  Goal: Unobstructed Breathing During Sleep  Outcome: Ongoing, Progressing     Problem: [place]  Goal: Collaboration with the Healthcare Team  Outcome: Ongoing, Progressing  Intervention: [place]  Recent Flowsheet Documentation  Taken 12/16/2022 0120 by Chery Maddox RN  Supportive Measures:   self-care encouraged   active listening utilized     Problem: Skin Injury Risk Increased  Goal: Skin Health and Integrity  Outcome: Ongoing, Progressing  Intervention: Optimize Skin Protection  Recent Flowsheet Documentation  Taken 12/16/2022 0120 by Chery Maddox RN  Head of Bed (HOB) Positioning: HOB elevated     Problem: Electrolyte Imbalance  Goal: Electrolyte Balance  Outcome: Ongoing, Progressing   Goal Outcome Evaluation:  Plan of Care Reviewed With: patient        Progress: no change

## 2022-12-16 NOTE — ANESTHESIA PROCEDURE NOTES
Arterial Line      Patient reassessed immediately prior to procedure    Patient location during procedure: OR  Start time: 12/16/2022 4:15 PM  Stop Time:12/16/2022 4:23 PM       Line placed for hemodynamic monitoring.  Performed By   CRNA/ROBERTO: Breanne Maya CRNA SRNA: Komal Erwin SRNA  Preanesthetic Checklist  Completed: patient identified, IV checked, site marked, risks and benefits discussed, surgical consent, monitors and equipment checked, pre-op evaluation and timeout performed  Arterial Line Prep    Sterile Tech: cap, gloves, gown and mask  Prep: alcohol swabs and ChloraPrep  Patient monitoring: blood pressure monitoring, continuous pulse oximetry and EKG  Arterial Line Procedure   Laterality:left  Location:  radial artery  Catheter size: 20 G   Guidance: ultrasound guided  PROCEDURE NOTE/ULTRASOUND INTERPRETATION.  Using ultrasound guidance the potential vascular sites for insertion of the catheter were visualized to determine the patency of the vessel to be used for vascular access.  After selecting the appropriate site for insertion, the needle was visualized under ultrasound being inserted into the radial artery, followed by ultrasound confirmation of wire and catheter placement. There were no abnormalities seen on ultrasound; an image was taken; and the patient tolerated the procedure with no complications.   Number of attempts: 2  Successful placement: yes Images: still images not obtained  Post Assessment   Dressing Type: secured with tape, occlusive dressing applied, biopatch applied and wrist guard applied.   Complications no  Circ/Move/Sens Assessment: normal.   Patient Tolerance: patient tolerated the procedure well with no apparent complications

## 2022-12-16 NOTE — PROGRESS NOTES
[facility] Medicine Services  INPATIENT PROGRESS NOTE    Length of Stay: 1  Date of Admission: 12/15/2022  Primary Care Physician: Kiersten Wilson APRN    Subjective   Chief Complaint: Hyperkalemia  HPI:    Potassium level has been elevated today.  Currently undergoing dialysis.  After dialysis plan is for surgery   Doing stable right now.    Review of Systems   Constitutional: Negative for fever.   HENT: Negative for congestion.    Respiratory: Negative for shortness of breath.    Cardiovascular: Negative for chest pain.   Gastrointestinal: Negative for abdominal pain.   Endocrine: Negative for polyuria.   Genitourinary: Negative for difficulty urinating.   Musculoskeletal: Negative for arthralgias.   Neurological: Negative for dizziness.   Hematological: Negative for adenopathy.   Psychiatric/Behavioral: Negative for agitation.        All pertinent negatives and positives are as above. All other systems have been reviewed and are negative unless otherwise stated.     Objective    Temp:  [96.3 °F (35.7 °C)-98.2 °F (36.8 °C)] 98.2 °F (36.8 °C)  Heart Rate:  [70-88] 88  Resp:  [12-21] 15  BP: ()/(50-73) 93/63  Physical Exam  Vitals and nursing note reviewed.   Constitutional:       General: She is not in acute distress.     Appearance: She is well-developed. She is not diaphoretic.   HENT:      Head: Normocephalic and atraumatic.   Cardiovascular:      Rate and Rhythm: Normal rate.   Pulmonary:      Effort: Pulmonary effort is normal. No respiratory distress.      Breath sounds: No wheezing.   Abdominal:      General: There is no distension.      Palpations: Abdomen is soft.   Musculoskeletal:         General: Normal range of motion.   Skin:     General: Skin is warm and dry.   Neurological:      Mental Status: She is alert.      Cranial Nerves: No cranial nerve deficit.   Psychiatric:         Behavior: Behavior normal.         Thought Content: Thought content  normal.         Judgment: Judgment normal.             Results Review:  I have reviewed the labs, radiology results, and diagnostic studies.    Laboratory Data:   Lab Results (last 24 hours)     Procedure Component Value Units Date/Time    POC Glucose Once [[id number]]  (Abnormal) Collected: 12/16/22 0656    Specimen: Blood Updated: 12/16/22 0707     Glucose 149 mg/dL      Comment: Result Not ConfirmedOperator: [id number] LAPINID IDAMeter ID: [id number]       CBC & Differential [[id number]]  (Abnormal) Collected: 12/16/22 0259    Specimen: Blood Updated: 12/16/22 0606    Narrative:      The following orders were created for panel order CBC & Differential.  Procedure                               Abnormality         Status                     ---------                               -----------         ------                     Manual Differential[[id number]]          Abnormal            Final result               CBC Auto Differential[[id number]]        Abnormal            Final result                 Please view results for these tests on the individual orders.    Manual Differential [[id number]]  (Abnormal) Collected: 12/16/22 0259    Specimen: Blood Updated: 12/16/22 0606     Neutrophil % 64.0 %      Lymphocyte % 19.0 %      Monocyte % 7.0 %      Eosinophil % 4.0 %      Atypical Lymphocyte % 6.0 %      Neutrophils Absolute 5.93 10*3/mm3      Lymphocytes Absolute 2.32 10*3/mm3      Monocytes Absolute 0.65 10*3/mm3      Eosinophils Absolute 0.37 10*3/mm3      RBC Morphology Normal     WBC Morphology Normal     Platelet Estimate Adequate    POC Glucose Once [[id number]]  (Normal) Collected: 12/16/22 0540    Specimen: Blood Updated: 12/16/22 0552     Glucose 91 mg/dL      Comment: : [id number] LAPINID IDAMeter ID: [id number]       CBC Auto Differential [[id number]]  (Abnormal) Collected: 12/16/22 0259    Specimen: Blood Updated: 12/16/22 0409     WBC 9.27 10*3/mm3      RBC 3.78 10*6/mm3      Hemoglobin 10.5 g/dL       Hematocrit 33.0 %      MCV 87.3 fL      MCH 27.8 pg      MCHC 31.8 g/dL      RDW 16.9 %      RDW-SD 54.0 fl      MPV 8.7 fL      Platelets 213 10*3/mm3     Comprehensive Metabolic Panel [[id number]]  (Abnormal) Collected: 12/16/22 0259    Specimen: Blood Updated: 12/16/22 0407     Glucose 100 mg/dL      BUN 72 mg/dL      Creatinine 6.27 mg/dL      Sodium 135 mmol/L      Potassium 7.7 mmol/L      Chloride 99 mmol/L      CO2 22.0 mmol/L      Calcium 9.2 mg/dL      Total Protein 7.8 g/dL      Albumin 3.10 g/dL      ALT (SGPT) 16 U/L      AST (SGOT) 23 U/L      Alkaline Phosphatase 297 U/L      Total Bilirubin 0.4 mg/dL      Globulin 4.7 gm/dL      A/G Ratio 0.7 g/dL      BUN/Creatinine Ratio 11.5     Anion Gap 14.0 mmol/L      eGFR 7.0 mL/min/1.73      Comment: <15 Indicative of kidney failure       Narrative:      GFR Normal >60  Chronic Kidney Disease <60  Kidney Failure <15      Phosphorus [[id number]]  (Abnormal) Collected: 12/16/22 0259    Specimen: Blood Updated: 12/16/22 0404     Phosphorus 8.9 mg/dL     Magnesium [[id number]]  (Normal) Collected: 12/16/22 0259    Specimen: Blood Updated: 12/16/22 0404     Magnesium 1.9 mg/dL     Vitamin D,25-Hydroxy [[id number]]  (Normal) Collected: 12/15/22 1658    Specimen: Blood Updated: 12/16/22 0209     25 Hydroxy, Vitamin D 37.6 ng/ml     Narrative:      Reference Range for Total Vitamin D 25(OH)     Deficiency <20.0 ng/mL   Insufficiency 21-29 ng/mL   Sufficiency  ng/mL  Toxicity >100 ng/ml    Results may be falsely increased if patient taking Biotin.      POC Glucose Once [[id number]]  (Abnormal) Collected: 12/16/22 0047    Specimen: Blood Updated: 12/16/22 0100     Glucose 162 mg/dL      Comment: RN NotifiedOperator: [id number] KIMBERLYN OPALMeter ID: [id number]       Basic Metabolic Panel [[id number]]  (Abnormal) Collected: 12/15/22 2233    Specimen: Blood Updated: 12/15/22 2304     Glucose 121 mg/dL      BUN 75 mg/dL      Creatinine 5.74 mg/dL      Sodium 135  mmol/L      Potassium 8.3 mmol/L      Chloride 99 mmol/L      CO2 22.0 mmol/L      Calcium 9.4 mg/dL      BUN/Creatinine Ratio 13.1     Anion Gap 14.0 mmol/L      eGFR 7.8 mL/min/1.73      Comment: <15 Indicative of kidney failure       Narrative:      GFR Normal >60  Chronic Kidney Disease <60  Kidney Failure <15      POC Glucose Once [[id number]]  (Normal) Collected: 12/15/22 2015    Specimen: Blood Updated: 12/15/22 2058     Glucose 109 mg/dL      Comment: : [id number] [facility] OPALMeter ID: [id number]       Extra Tubes [[id number]] Collected: 12/15/22 1923    Specimen: Blood, Venous Line Updated: 12/15/22 2032    Narrative:      The following orders were created for panel order Extra Tubes.  Procedure                               Abnormality         Status                     ---------                               -----------         ------                     Light Blue Top[[id number]]                                   Final result                 Please view results for these tests on the individual orders.    Light Blue Top [[id number]] Collected: 12/15/22 1923    Specimen: Blood Updated: 12/15/22 2032     Extra Tube Hold for add-ons.     Comment: Auto resulted       Blood Culture - Blood, Arm, Left [[id number]] Collected: 12/15/22 1818    Specimen: Blood from Arm, Left Updated: 12/15/22 1921    Blood Culture - Blood, Arm, Left [[id number]] Collected: 12/15/22 1818    Specimen: Blood from Arm, Left Updated: 12/15/22 1921    Basic Metabolic Panel [[id number]]  (Abnormal) Collected: 12/15/22 1658    Specimen: Blood Updated: 12/15/22 1832     Glucose 67 mg/dL      BUN 75 mg/dL      Creatinine 6.50 mg/dL      Sodium 138 mmol/L      Potassium 7.3 mmol/L      Comment: Slight hemolysis detected by analyzer. Results may be affected.        Chloride 100 mmol/L      CO2 18.0 mmol/L      Calcium 9.3 mg/dL      BUN/Creatinine Ratio 11.5     Anion Gap 20.0 mmol/L      eGFR 6.7 mL/min/1.73      Comment: <15 Indicative of  kidney failure       Narrative:      GFR Normal >60  Chronic Kidney Disease <60  Kidney Failure <15      TSH [[id number]]  (Abnormal) Collected: 12/15/22 1658    Specimen: Blood Updated: 12/15/22 1815     TSH 8.680 uIU/mL     Lipid Panel [[id number]] Collected: 12/15/22 1658    Specimen: Blood Updated: 12/15/22 1809     Total Cholesterol 135 mg/dL      Triglycerides 88 mg/dL      HDL Cholesterol 53 mg/dL      LDL Cholesterol  65 mg/dL      VLDL Cholesterol 17 mg/dL      LDL/HDL Ratio 1.22    Narrative:      Cholesterol Reference Ranges  (U.S. Department of Health and Human Services ATP III Classifications)    Desirable          <200 mg/dL  Borderline High    200-239 mg/dL  High Risk          >240 mg/dL      Triglyceride Reference Ranges  (U.S. Department of Health and Human Services ATP III Classifications)    Normal           <150 mg/dL  Borderline High  150-199 mg/dL  High             200-499 mg/dL  Very High        >500 mg/dL    HDL Reference Ranges  (U.S. Department of Health and Human Services ATP III Classifications)    Low     <40 mg/dl (major risk factor for CHD)  High    >60 mg/dl ('negative' risk factor for CHD)        LDL Reference Ranges  (U.S. Department of Health and Human Services ATP III Classifications)    Optimal          <100 mg/dL  Near Optimal     100-129 mg/dL  Borderline High  130-159 mg/dL  High             160-189 mg/dL  Very High        >189 mg/dL    Hepatitis B Surface Antigen [[id number]]  (Normal) Collected: 12/15/22 1658    Specimen: Blood Updated: 12/15/22 1757     Hepatitis B Surface Ag Non-Reactive    Hemoglobin A1c [[id number]]  (Normal) Collected: 12/15/22 1657    Specimen: Blood Updated: 12/15/22 1733     Hemoglobin A1C 5.30 %     Narrative:      Hemoglobin A1C Ranges:    Increased Risk for Diabetes  5.7% to 6.4%  Diabetes                     >= 6.5%  Diabetic Goal                < 7.0%    Urine Drug Screen - Urine, Clean Catch [[id number]]  (Abnormal) Collected: 12/15/22 1721     Specimen: Urine, Clean Catch Updated: 12/15/22 1733     THC, Screen, Urine Negative     Phencyclidine (PCP), Urine Negative     Cocaine Screen, Urine Negative     Methamphetamine, Ur Negative     Opiate Screen Negative     Amphetamine Screen, Urine Negative     Benzodiazepine Screen, Urine Negative     Tricyclic Antidepressants Screen Negative     Methadone Screen, Urine Negative     Barbiturates Screen, Urine Negative     Oxycodone Screen, Urine Positive     Propoxyphene Screen Negative     Buprenorphine, Screen, Urine Negative    Narrative:      Cutoff For Drugs Screened:    Amphetamines               500 ng/ml  Barbiturates               200 ng/ml  Benzodiazepines            150 ng/ml  Cocaine                    150 ng/ml  Methadone                  200 ng/ml  Opiates                    100 ng/ml  Phencyclidine               25 ng/ml  THC                            50 ng/ml  Methamphetamine            500 ng/ml  Tricyclic Antidepressants  300 ng/ml  Oxycodone                  100 ng/ml  Propoxyphene               300 ng/ml  Buprenorphine               10 ng/ml    The normal value for all drugs tested is negative. This report includes unconfirmed screening results, with the cutoff values listed, to be used for medical treatment purposes only.  Unconfirmed results must not be used for non-medical purposes such as employment or legal testing.  Clinical consideration should be applied to any drug of abuse test, particularly when unconfirmed results are used.      POC Glucose Once [[id number]]  (Abnormal) Collected: 12/15/22 1657    Specimen: Blood Updated: 12/15/22 1714     Glucose 67 mg/dL      Comment: : [id number] MINERVA CAINITAMeter ID: [id number]             Culture Data:   No results found for: BLOODCX  No results found for: URINECX  No results found for: RESPCX  No results found for: WOUNDCX  No results found for: STOOLCX  No components found for: BODYFLD    Radiology Data:   Imaging Results (Last 24  Hours)     ** No results found for the last 24 hours. **          I have reviewed the patient's current medications.     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  Place the patient on IV antibiotics.  Cefepime, vancomycin pharmacy to dose, and Flagyl.  Obtain blood cultures.  Continue follow labs.  CBC complete metabolic profile tonight and in a.m.  Check chest x-ray and EKG.  Pharmacy to dose vancomycin.  Consult Dr. White for surgical intervention.  Below-knee amputation.  Plan for below-knee amputation after potassium normalizes, currently undergoing dialysis.     End-stage renal disease.  Consult Dr. Gupta for arrangements for dialysis which is usually completed Monday Wednesday and Friday.     Insulin-dependent diabetes mellitus type 2.  We will check A1c.  Check TSH.  Continue to monitor     Hyperlipidemia continue Lipitor.     Diabetic neuropathy continue Cymbalta and gabapentin     Hypertension continue losartan     Chronic pain continue oxycodone     GERD continue Protonix     Morbid obesity BMI is 47.  Continue nutritional support.     DVT prophylaxis Heparin     CODE STATUS full code            Simeon Peña MD   12/16/22   11:54 CST

## 2022-12-16 NOTE — PROGRESS NOTES
Nurse Practitioner - Brief Progress Note  PERMANENT  12/16/2022 02:49    [facility] - CCU/SD - 20 - [place], [place] ([facility])    DALE FINK    Date of Service 12/16/2022 02:49    HPI/Events of Note [facility] Provider Assessment Note      61 yo female admitted to SDU for hyperkalemia.  Also noted to have L foot gangrene and sent for admission by Orthopedic Surgery, pending BKA.  K 7.3 @ 1658 and K 8.3 @ 2233.        K treated with hyperkalemia protocol.  Repeat K pending.  Nephrology consulted for HD in AM.  Strict i/o and avoid nephrotoxins and contrast as possible.  Renally dose meds.  Monitor for ectopy.    Blood cultures pending.  Empiric Vanc, Cefepime, and Metronidazole.    **Discussed case with bedside RN.  As noted above, repeat K is pending.  If increasing, advised RN to notify Nephrology and Surgery.  Suspect gangrenous foot as etiology for difficulty controlling potassium level.          PMH: CAD, CHF, HTN, HLD, CABG, PVD, Carotid Stenosis s/p Stent, Morbid Obesity, Sleep Apnea, COPD, ESRD on HD, DM II, Smoking 23 pack years    Vitals: T 96.8, HR 73, BP 96/54 (72), RR 17, Sat 97%      __X___   Video Assessment performed   __X___   Most recent labs reviewed  __X___   Vital Signs reviewed  __X___   Best Practices addressed:                 VTE prophylaxis: Hep SC                 SUP (when indicated): Protonix                 Glycemic control: SSI                      Please notify bedside physician when present or [facility] if glc > 180 X 2                 Sepsis guidelines: as above                 Lung protective strategy:     __X___     Spoke with bedside RN  _____     Orders written    Note drafted by PURA Contreras  Contact [facility] for any needs if bedside physician is not present.      Interventions Major-Electrolyte abnormality - evaluation and management, Sepsis - evaluation and management, Other: Gangrenous L  Foot  Intermediate-Communication with other healthcare providers and/or family        Electronically Signed by: Rakesh Shields (GEORGE) on 12/16/2022 02:50    Annotated By: Aman Yusuf (MD)    Date: 12/16/22 03:51  Note reviewed and video assessment done

## 2022-12-16 NOTE — CONSULTS
[facility]  [address] [place] [address]. [id number]  T - [phone]  F - [phone]     Consultation         PATIENT  DEMOGRAPHICS   PATIENT NAME: Yamileth Slater                      PHYSICIAN: Ace Lauren MD  : 1960  MRN: [id number]    Subjective   SUBJECTIVE   Referring Provider: Dr Roberson  Reason for Consultation: esrd  History of present illness:      Ms. Slater is well-known to our group for end-stage kidney disease and is currently on intermittent dialysis in [place] under .  She has history of diabetes mellitus type 2 chronic hypoxic respiratory failure on home oxygen coronary artery disease and wounds in both extremities.  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  She has poor blood flow below the left knee and due to nonhealing of the wound plan for left below-knee amputation.  Patient is therefore admitted yesterday for surgery this afternoon    On admission she has elevated potassium between 7-8.  This has been corrected multiple times overnight.  I have discussed the case with Dr. Lim overnight.  We have arranged hemodialysis urgently this morning.  Patient has missed her treatment on Monday and Wednesday but also she is confused during my history and exam.  She denies any marked shortness of air.    Past Medical History:   Diagnosis Date   • Acute blood loss anemia 2017    Likely due to gastric oozing at this time. - Dr. Duarte (GI) was consulted and has now signed off, will follow up outpatient - pill colonoscopy showed AVMs - continue to monitor   • Acute cystitis with hematuria 2021: IV Rocephin 1 gm q 24  : transitioned  to omnicef 300mg. Urine cultures resulted and did not show growth. Omnicef discontinued as patient is asymptomatic   • Altered mental status 2022    - AMS on presentation - initial ABG pH 7.3, CO2 34 - Procal 0.29  - UA negative for acute cysitits -CTA head wnl  - Empiric Zosyn and Vancomycin -Lactate 2.5 on admission  - blood cultures no growth at 24 hours     • Anxiety    • CAD (coronary artery disease) 04/24/2021    S/P 3 stents 5/1/2021 for BHL Continue ASA 81mg & Clopidogrel 75mg Continue Atorvastatin 40mg   • Carotid artery stenosis    • CHF (congestive heart failure) ([facility])    • Chronic obstructive lung disease ([facility])    • CKD (chronic kidney disease) stage 4, GFR 15-29 ml/min ([facility])    • CKD (chronic kidney disease), symptom management only, stage 5 ([facility]) 10/05/2020    Results from last 7 days Lab Units 12/15/21 0548 12/14/21 1323 12/14/21 0916 CREATININE mg/dL 3.92* 3.21* 3.32*  Baseline creatinine 2-3 GFR 13-25 GFR 15 Dialysis MWF, sees Dr. Lauren Nephrology consult,, appreciate recommendations Continue Bumex 1mg bid daily Holding Bumex 2mg 4 times a week   • Colonic polyp    • Coronary arteriosclerosis    • Diabetes mellitus ([facility])    • Diabetic neuropathy ([facility])    • Ear pain, right 10/18/2021    - canal trauma due to patient scratching and DMT2 - added cortisporin ear drops   • Elevated troponin 10/12/2021    -most likely from CKD -Trending down -Neg chest pain   • Generalized abdominal pain 07/01/2022    Could be due to initiation of tube feeds vs dyspepsia vs abdominal cramps related to no PO intake due to intubation vs constipation Continue current laxative regiment  If no bowel movement by this afternoon will consider enema   • GERD (gastroesophageal reflux disease)    • GI bleed 05/13/2021    - GI will follow up outpatient - Protonix 40mg daily - Avoid medical DVT prophy and use mechanical at this time instead. - Continue to monitor - pill colonoscopy results showed AVMs   • History of transfusion    • Hypercholesterolemia    • Hyperosmolar hyperglycemic state (HHS) ([facility]) 06/25/2022    Serum glucose 605 on admission  Anion gap 16 PH 7.37 Bicarb 27.9 Continue fluids  Insulin drip with [facility] protocol  Anion gap closed  around 10 AM, received one dose of Levamir subq, will stop insulin drip after 2 hrs     • Hypertension    • Hypokalemia 05/27/2022    Will replace as needed. Will be cautious in the setting of ESRD to avoid need for emergency dialysis   Monitor Qtc intervals on EKG     • Hypomagnesemia 06/27/2021    Monitor and replace   • Morbid obesity ([facility])    • Nephrolithiasis    • On mechanically assisted ventilation ([facility]) 06/26/2022    Vent management and sedation orders placed.  - [facility] intensivist group consulted for vent management appreciate recommendations  - plan to extubate today     • Peripheral vascular disease ([facility])    • Pleural effusion on right 06/26/2022    CXR on 6/30/22 read as a small upper left pulmonary edema vs early pneumonia.  Last Echo 1/2022 EF 61-65 % Continue to monitor  Procal slightly improved, CRP improved On Linezolid and meropenum      • PVD (peripheral vascular disease) ([facility])    • SIRS (systemic inflammatory response syndrome) ([facility]) 01/09/2022    Admission  - WBC 17.78   -   - RR 16 - 1/10: VSS/wnl - CXR - Mild pulm edema - Blood cultures no growth at 24 hours  - Procalcitonin 0.29 - UA : glucose 1000, negative Leucocytes/nitrate - Empiric Zosyn and Vancomcyin    • Sleep apnea    • Substance abuse ([facility])    • Vitamin D deficiency      Past Surgical History:   Procedure Laterality Date   • CARDIAC CATHETERIZATION N/A 07/14/2020   • CARDIAC CATHETERIZATION N/A 04/23/2021    Procedure: Left Heart Cath;  Surgeon: Melba Romo MD;  Location: [facility] INVASIVE LOCATION;  Service: Cardiology;  Laterality: N/A;   • CARDIAC CATHETERIZATION N/A 04/30/2021    Procedure: Percutaneous Coronary Intervention;  Surgeon: Russell Voss MD;  Location: [facility] CATH INVASIVE LOCATION;  Service: Cardiovascular;  Laterality: N/A;   • CARDIAC CATHETERIZATION N/A 04/30/2021    Procedure: Stent NIKKI coronary;  Surgeon: Russell Voss MD;  Location: [facility] INVASIVE LOCATION;   Service: Cardiovascular;  Laterality: N/A;   • CARDIAC CATHETERIZATION Left 11/13/2021    Procedure: Left Heart Cath;  Surgeon: Niall Rios MD;  Location: [facility] CATH INVASIVE LOCATION;  Service: Cardiology;  Laterality: Left;   • CAROTID STENT Left    • COLONOSCOPY     • COLONOSCOPY N/A 05/14/2021    Procedure: COLONOSCOPY;  Surgeon: Mingo Duarte MD;  Location: [facility] ENDOSCOPY;  Service: Gastroenterology;  Laterality: N/A;   • CORONARY ARTERY BYPASS GRAFT N/A 2013    CABG X 3   • CYSTOSCOPY BLADDER STONE LITHOTRIPSY Bilateral    • ENDOSCOPY N/A 04/12/2021    Procedure: ESOPHAGOGASTRODUODENOSCOPY;  Surgeon: Mingo Duarte MD;  Location: [facility] ENDOSCOPY;  Service: Gastroenterology;  Laterality: N/A;   • ENDOSCOPY N/A 05/14/2021    Procedure: ESOPHAGOGASTRODUODENOSCOPY;  Surgeon: Mingo Duarte MD;  Location: [facility] ENDOSCOPY;  Service: Gastroenterology;  Laterality: N/A;   • ENDOSCOPY N/A 01/28/2022    Procedure: ESOPHAGOGASTRODUODENOSCOPY;  Surgeon: Mingo Duarte MD;  Location: [facility] ENDOSCOPY;  Service: Gastroenterology;  Laterality: N/A;   • HYSTERECTOMY     • INTERVENTIONAL RADIOLOGY PROCEDURE N/A 10/21/2021    Procedure: tunneled central venous catheter placement;  Surgeon: Donnie Robles MD;  Location: [facility] ANGIO INVASIVE LOCATION;  Service: Interventional Radiology;  Laterality: N/A;   • INTERVENTIONAL RADIOLOGY PROCEDURE N/A 01/27/2022    Procedure: tunneled central venous catheter placement;  Surgeon: Donnie Robles MD;  Location: [facility] ANGIO INVASIVE LOCATION;  Service: Interventional Radiology;  Laterality: N/A;   • LAPAROSCOPIC TUBAL LIGATION     • TUNNELED VENOUS CATHETER PLACEMENT       Family History   Problem Relation Age of Onset   • Heart disease Mother    • Lung cancer Mother    • Heart disease Father    • Heart attack Father    • Diabetes Father    • Heart disease Half-Sister         Dad's side   • Heart disease Brother    • No Known Problems Sister     • No Known Problems Sister    • No Known Problems Sister    • No Known Problems Sister    • No Known Problems Sister    • Pancreatic cancer Half-Sister         Dad's side   • No Known Problems Brother    • No Known Problems Brother    • Heart attack Half-Brother    • Heart attack Half-Brother    • No Known Problems Maternal Grandmother    • No Known Problems Maternal Grandfather    • No Known Problems Paternal Grandmother    • No Known Problems Paternal Grandfather      Social History     Tobacco Use   • Smoking status: Every Day     Packs/day: 0.50     Years: 46.00     Pack years: 23.00     Types: Cigarettes     Start date: 2/1/1999   • Smokeless tobacco: Never   Vaping Use   • Vaping Use: Never used   Substance Use Topics   • Alcohol use: No   • Drug use: Not Currently     Types: LSD, Marijuana, Methamphetamines     Allergies:  Adhesive tape, Nsaids, Latex, Other, and Wound dressing adhesive     REVIEW OF SYSTEMS    Review of Systems   Unable to perform ROS: Acuity of condition       Objective   OBJECTIVE   Vital Signs  Temp:  [96.3 °F (35.7 °C)-98.2 °F (36.8 °C)] 98.2 °F (36.8 °C)  Heart Rate:  [70-88] 84  Resp:  [12-21] 14  BP: ()/(51-73) 113/65    Flowsheet Rows    Flowsheet Row First Filed Value   Admission Height 157.5 cm (62\") Documented at 12/15/2022 1700   Admission Weight 124 kg (273 lb 4.8 oz) Documented at 12/15/2022 1700           I/O last 3 completed shifts:  In: -   Out: 600 [Urine:300; Stool:300]    PHYSICAL EXAM    Physical Exam  Constitutional:       Appearance: She is well-developed.   HENT:      Head: Normocephalic.   Eyes:      Pupils: Pupils are equal, round, and reactive to light.   Cardiovascular:      Rate and Rhythm: Normal rate and regular rhythm.      Heart sounds: Normal heart sounds.   Pulmonary:      Effort: Pulmonary effort is normal.      Breath sounds: Normal breath sounds.   Abdominal:      General: Bowel sounds are normal.      Palpations: Abdomen is soft.    Musculoskeletal:         General: No swelling.   Skin:     Coloration: Skin is not jaundiced.   Neurological:      General: No focal deficit present.      Mental Status: She is alert. She is disoriented.         RESULTS   Results Review:    Results from last 7 days   Lab Units 12/16/22  0259 12/15/22  2233 12/15/22  1658   SODIUM mmol/L 135* 135* 138   POTASSIUM mmol/L 7.7* 8.3* 7.3*   CHLORIDE mmol/L 99 99 100   CO2 mmol/L 22.0 22.0 18.0*   BUN mg/dL 72* 75* 75*   CREATININE mg/dL 6.27* 5.74* 6.50*   CALCIUM mg/dL 9.2 9.4 9.3   BILIRUBIN mg/dL 0.4  --   --    ALK PHOS U/L 297*  --   --    ALT (SGPT) U/L 16  --   --    AST (SGOT) U/L 23  --   --    GLUCOSE mg/dL 100* 121* 67       Estimated Creatinine Clearance: 11.7 mL/min (A) (by C-G formula based on SCr of 6.27 mg/dL (H)).    Results from last 7 days   Lab Units 12/16/22  0259   MAGNESIUM mg/dL 1.9   PHOSPHORUS mg/dL 8.9*             Results from last 7 days   Lab Units 12/16/22  0259   WBC 10*3/mm3 9.27   HEMOGLOBIN g/dL 10.5*   PLATELETS 10*3/mm3 213              MEDICATIONS    atorvastatin, 20 mg, Oral, Nightly  ceFAZolin, 2 g, Intravenous, Once  cefepime, 1 g, Intravenous, Q24H  DULoxetine, 20 mg, Oral, Daily  epoetin hubert/hubert-epbx, 10,000 Units, Intravenous, Once per day on Mon Wed Fri  furosemide, 100 mg, Intravenous, Once  gabapentin, 300 mg, Oral, Once per day on Mon Wed Fri   And  gabapentin, 300 mg, Oral, 2 times per day on Sun Tue Thu Sat  heparin (porcine), 5,000 Units, Subcutaneous, Q8H  Insulin Aspart, 0-9 Units, Subcutaneous, TID AC  losartan, 100 mg, Oral, Daily  memantine, 5 mg, Oral, BID  metroNIDAZOLE, 500 mg, Intravenous, Q8H  pantoprazole, 40 mg, Oral, Nightly  sevelamer, 800 mg, Oral, TID With Meals  sodium chloride, 10 mL, Intravenous, Q12H      O2, 2 L/min  Pharmacy to dose vancomycin,   sodium chloride, 1,000 mL      Medications Prior to Admission   Medication Sig Dispense Refill Last Dose   • albuterol (PROVENTIL) (2.5 MG/3ML) 0.083%  nebulizer solution Inhale the contents of 1 vial by nebulization Every 4 (Four) Hours As Needed for Wheezing. 75 mL 3 Past Week   • albuterol sulfate  (90 Base) MCG/ACT inhaler Inhale 2 puffs Every 4 (Four) Hours As Needed for Wheezing. 18 g 1 Past Week   • atorvastatin (LIPITOR) 20 MG tablet Take 1 tablet by mouth every night at bedtime. 90 tablet 0 12/14/2022   • Cetirizine HCl 10 MG capsule Take 1 capsule by mouth Daily.   12/14/2022   • clopidogrel (PLAVIX) 75 MG tablet Take 1 tablet by mouth Daily. 30 tablet 5 12/14/2022   • CYCLOBENZAPRINE HCL PO Take 5 mg by mouth 2 (Two) Times a Day.   12/15/2022   • docusate sodium (COLACE) 100 MG capsule Take 100 mg by mouth 2 (Two) Times a Day.   12/15/2022   • acetaminophen (Tylenol 8 Hour) 650 MG 8 hr tablet Take 1 tablet by mouth Every 8 (Eight) Hours As Needed for Mild Pain . 90 tablet 0 Unknown   • Blood Glucose Monitoring Suppl ([employer] Blood Glucose Meter) w/Device kit 1 each 3 (Three) Times a Day. 1 kit 3    • Capsaicin 0.035 % cream Apply 3 g topically 3 (Three) Times a Day As Needed (ankle pain). 42.5 g 2    • Diclofenac Sodium (VOLTAREN) 1 % gel gel Apply 4 g topically to the appropriate area as directed 4 (Four) Times a Day As Needed (Ankle pain). 350 g 2    • DULoxetine (CYMBALTA) 20 MG capsule Take 20 mg by mouth Daily. Indications: Disease of the Peripheral Nerves      • Easy Touch Insulin Syringe 30G X 5/16\" 0.5 ML misc USE AS DIRECTED WITH LEVEMIR      • EASY TOUCH PEN NEEDLES 31G X 8 MM misc       • glucose blood test strip Use as instructed 100 each 12    • hydrOXYzine (ATARAX) 25 MG tablet Take 25 mg by mouth Every 8 (Eight) Hours As Needed for Itching.      • Insulin Lispro (humaLOG) 100 UNIT/ML injection Inject 14 Units under the skin into the appropriate area as directed 3 (Three) Times a Day Before Meals. In addition to sliding scale      • Insulin Lispro, 1 Unit Dial, (HUMALOG) 100 UNIT/ML solution pen-injector Inject 12 Units under the skin  into the appropriate area as directed 3 (Three) Times a Day With Meals. (Patient taking differently: Inject  under the skin into the appropriate area as directed 3 (Three) Times a Day With Meals. Takes 14 units with meals plus sliding scale  Sliding scale:   150-199 take 4 units  200-249 take 8 units  250-299 take 12 units  300-349 take 16 units  350-400 take 20 units  greater than 400, call physician) 30 mL 12    • Insulin Pen Needle (NovoFine) 30G X 8 MM misc As directed 4 times daily 100 each 11    • Insulin Pen Needle 31G X 8 MM misc Use to inject insulin 4 (Four) Times a Day as directed. 120 each 12    • ipratropium-albuterol (DUO-NEB) 0.5-2.5 mg/3 ml nebulizer Take 3 mL by nebulization 4 (Four) Times a Day As Needed.      • losartan (COZAAR) 100 MG tablet Take 100 mg by mouth Daily.      • memantine (NAMENDA) 5 MG tablet Take 5 mg by mouth 2 (Two) Times a Day.      • Methoxy PEG-Epoetin Beta (MIRCERA IJ) 225 mcg Every 14 (Fourteen) Days.      • montelukast (SINGULAIR) 10 MG tablet Take 1 tablet by mouth Every Night. 90 tablet 0    • nitroglycerin (NITROSTAT) 0.4 MG SL tablet Place 0.4 mg under the tongue Every 5 (Five) Minutes As Needed for Chest Pain (x 3 doses).      • O2 (OXYGEN) Inhale 2 L/min Continuous.      • ondansetron ODT (Zofran ODT) 4 MG disintegrating tablet Place 1 tablet on the tongue Every 8 (Eight) Hours As Needed for Nausea or Vomiting. 30 tablet 0    • oxyCODONE (ROXICODONE) 10 MG tablet Take 10 mg by mouth 4 (Four) Times a Day As Needed for Moderate Pain or Severe Pain.      • pantoprazole (PROTONIX) 40 MG EC tablet Take 1 tablet by mouth Every Night. 90 tablet 0    • promethazine (PHENERGAN) 25 MG tablet Take 25 mg by mouth Every 6 (Six) Hours As Needed for Nausea or Vomiting.      • sevelamer (RENVELA) 800 MG tablet Take 800 mg by mouth 3 (Three) Times a Day With Meals.      • Vitamin D, Ergocalciferol, 55935 units capsule Take 50,000 Units by mouth 1 (One) Time Per Week. Take on  Wednesday  Indications: Kidney Failure Syndrome        Assessment & Plan   ASSESSMENT / PLAN      Venous insufficiency    Morbid obesity with BMI of 50.0-59.9, adult (HCC)    Coronary artery disease involving native coronary artery of native heart without angina pectoris    Carotid artery disease (HCC)    PAD (peripheral artery disease) (HCC)    Pulmonary hypertension (HCC)    MIKE and COPD overlap syndrome (HCC)    ESRD on hemodialysis (HCC)    Left foot pain    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  She is currently dialyzing at [facility]. her current access is tunneled catheter.  She has prior right upper extremity failed AV fistula.  We have arrange urgent dialysis early this morning and try to do 1K bath for 2 hours and 2K bath for the 1 and 1/2 hours.  She also received medical treatment earlier in the morning.  I will repeat the potassium at 1 PM to give some time for electrolyte shift  after dialysis.  Plan for surgery later this afternoon    2- anemia chronic kidney disease patient has history of B12 deficiency and AV malformation.  hemoglobin is more than 10    3.diabetes mellitus type 2 with nonhealing wound on the left foot plan for left BKA    4. CAD    5. ckd mbd on sevelamer in out patient setting    6. htn stable on losartan    Thank you for the consult.  We will continue to follow the patient during their hospital stay.       I discussed the patients findings and my recommendations with patient and nursing staff             This document has been electronically signed by Ace Lauren MD on December 16, 2022 09:48 CST

## 2022-12-16 NOTE — SIGNIFICANT NOTE
[facility]  INPATIENT PROGRESS NOTE    Length of Stay: 1  Date of Admission: 12/15/2022  Primary Care Physician: Kiersten Wilson APRN    Subjective   Chief Complaint:   Hyperkalemia    HPI:    On 5 PM blood work patient was found to have a potassium of 7.3.  She is a known dialysis patient who last dialyzed on Wednesday (she goes on M/W/F regimen).  Patient has been admitted directly from the orthopedic team for an infection in her left foot which is now ultimately become gangrenous.  The patient is being considered for below-knee amputation when medically stable.    Review of Systems   Unable to perform ROS: Mental status change   Gastrointestinal: Positive for nausea.        All pertinent negatives and positives are as above. All other systems have been reviewed and are negative unless otherwise stated.     Objective    Temp:  [96.3 °F (35.7 °C)-96.8 °F (36 °C)] 96.8 °F (36 °C)  Heart Rate:  [70-72] 71  Resp:  [18-20] 18  BP: (141-157)/(63-73) 141/63    Physical Exam  Vitals and nursing note reviewed.   Constitutional:       General: She is awake. She is not in acute distress.     Appearance: She is morbidly obese. She is ill-appearing. She is not toxic-appearing or diaphoretic.      Interventions: Nasal cannula in place.   HENT:      Head: Normocephalic and atraumatic.      Nose: Nose normal. No congestion or rhinorrhea.      Mouth/Throat:      Mouth: Mucous membranes are dry.      Pharynx: Oropharynx is clear. No oropharyngeal exudate or posterior oropharyngeal erythema.   Eyes:      General: No scleral icterus.        Right eye: No discharge.         Left eye: No discharge.      Extraocular Movements: Extraocular movements intact.      Conjunctiva/sclera: Conjunctivae normal.      Pupils: Pupils are equal, round, and reactive to light.   Cardiovascular:      Rate and Rhythm: Normal rate and regular rhythm.      Pulses: Normal pulses.      Heart  sounds: Normal heart sounds. No murmur heard.    No friction rub. No gallop.   Pulmonary:      Effort: Pulmonary effort is normal. No respiratory distress.      Breath sounds: Normal breath sounds. No stridor. No wheezing, rhonchi or rales.      Comments: A few added sounds in the bases bilaterally  Abdominal:      General: Abdomen is flat. Bowel sounds are normal. There is no distension.      Palpations: Abdomen is soft. There is no mass.      Tenderness: There is no abdominal tenderness.      Hernia: No hernia is present.   Musculoskeletal:         General: No swelling.      Right lower leg: No edema.      Left lower leg: No edema.   Skin:     Capillary Refill: Capillary refill takes less than 2 seconds.      Coloration: Skin is not jaundiced.      Findings: No bruising, erythema or rash.   Neurological:      General: No focal deficit present.      Mental Status: She is lethargic.      Cranial Nerves: No cranial nerve deficit.   Psychiatric:         Behavior: Behavior is cooperative.         Results Review:  I have reviewed the labs, radiology results, and diagnostic studies.    Laboratory Data:   Results from last 7 days   Lab Units 12/15/22  2233 12/15/22  1658   SODIUM mmol/L 135* 138   POTASSIUM mmol/L 8.3* 7.3*   CHLORIDE mmol/L 99 100   CO2 mmol/L 22.0 18.0*   BUN mg/dL 75* 75*   CREATININE mg/dL 5.74* 6.50*   GLUCOSE mg/dL 121* 67   CALCIUM mg/dL 9.4 9.3   ANION GAP mmol/L 14.0 20.0*     Estimated Creatinine Clearance: 12.6 mL/min (A) (by C-G formula based on SCr of 5.74 mg/dL (H)).                    Culture Data:   No results found for: BLOODCX  No results found for: URINECX  No results found for: RESPCX  No results found for: WOUNDCX  No results found for: STOOLCX  No components found for: BODYFLD    Radiology Data:   Imaging Results (Last 24 Hours)     ** No results found for the last 24 hours. **          I have reviewed the patient's current medications.     Assessment/Plan     Active Hospital  Problems    Diagnosis    • **Venous insufficiency        Plan:    1.  stat EKG  2.  Transfer to ICU for high-level care setting  3.  We will readminister hyperkalemia therapy with bicarbonate, dextrose/insulin, and albuterol as well as further calcium gluconate  4.  We will get a repeat potassium approximately 1 hour after all therapies have been administered  5.  Depending on the results of the repeat potassium we will either repeat these therapies and/or contact nephrology for urgent dialysis secondary to refractory hyperkalemia        Quan Lim MD     60 minutes of dedicated critical care clinical time was devoted to this patient's care in total throughout the entirety of the night

## 2022-12-17 LAB
ALBUMIN SERPL-MCNC: 3.6 G/DL (ref 3.5–5.2)
ALBUMIN/GLOB SERPL: 0.7 G/DL
ALP SERPL-CCNC: 307 U/L (ref 39–117)
ALT SERPL W P-5'-P-CCNC: 13 U/L (ref 1–33)
ANION GAP SERPL CALCULATED.3IONS-SCNC: 16 MMOL/L (ref 5–15)
ANISOCYTOSIS BLD QL: NORMAL
AST SERPL-CCNC: 18 U/L (ref 1–32)
BACTERIA SPEC AEROBE CULT: NO GROWTH
BILIRUB SERPL-MCNC: 0.5 MG/DL (ref 0–1.2)
BUN SERPL-MCNC: 45 MG/DL (ref 8–23)
BUN/CREAT SERPL: 9.6 (ref 7–25)
CALCIUM SPEC-SCNC: 9.4 MG/DL (ref 8.6–10.5)
CHLORIDE SERPL-SCNC: 97 MMOL/L (ref 98–107)
CO2 SERPL-SCNC: 21 MMOL/L (ref 22–29)
CREAT SERPL-MCNC: 4.68 MG/DL (ref 0.57–1)
CRP SERPL-MCNC: 14.49 MG/DL (ref 0–0.5)
DEPRECATED RDW RBC AUTO: 53 FL (ref 37–54)
EGFRCR SERPLBLD CKD-EPI 2021: 10 ML/MIN/1.73
ERYTHROCYTE [DISTWIDTH] IN BLOOD BY AUTOMATED COUNT: 16.4 % (ref 12.3–15.4)
GLOBULIN UR ELPH-MCNC: 5 GM/DL
GLUCOSE BLDC GLUCOMTR-MCNC: 122 MG/DL (ref 70–130)
GLUCOSE BLDC GLUCOMTR-MCNC: 142 MG/DL (ref 70–130)
GLUCOSE BLDC GLUCOMTR-MCNC: 156 MG/DL (ref 70–130)
GLUCOSE SERPL-MCNC: 180 MG/DL (ref 65–99)
HCT VFR BLD AUTO: 36.6 % (ref 34–46.6)
HGB BLD-MCNC: 11 G/DL (ref 12–15.9)
HYPOCHROMIA BLD QL: NORMAL
LYMPHOCYTES # BLD MANUAL: 2.06 10*3/MM3 (ref 0.7–3.1)
LYMPHOCYTES NFR BLD MANUAL: 7 % (ref 5–12)
MCH RBC QN AUTO: 26.4 PG (ref 26.6–33)
MCHC RBC AUTO-ENTMCNC: 30.1 G/DL (ref 31.5–35.7)
MCV RBC AUTO: 88 FL (ref 79–97)
MONOCYTES # BLD: 0.66 10*3/MM3 (ref 0.1–0.9)
NEUTROPHILS # BLD AUTO: 6.65 10*3/MM3 (ref 1.7–7)
NEUTROPHILS NFR BLD MANUAL: 69 % (ref 42.7–76)
NEUTS BAND NFR BLD MANUAL: 2 % (ref 0–5)
PLATELET # BLD AUTO: 219 10*3/MM3 (ref 140–450)
PMV BLD AUTO: 8.4 FL (ref 6–12)
POTASSIUM SERPL-SCNC: 6 MMOL/L (ref 3.5–5.2)
PROT SERPL-MCNC: 8.6 G/DL (ref 6–8.5)
RBC # BLD AUTO: 4.16 10*6/MM3 (ref 3.77–5.28)
SMALL PLATELETS BLD QL SMEAR: ADEQUATE
SODIUM SERPL-SCNC: 134 MMOL/L (ref 136–145)
VANCOMYCIN SERPL-MCNC: 20 MCG/ML (ref 5–40)
VARIANT LYMPHS NFR BLD MANUAL: 22 % (ref 19.6–45.3)
WBC MORPH BLD: NORMAL
WBC NRBC COR # BLD: 9.36 10*3/MM3 (ref 3.4–10.8)

## 2022-12-17 PROCEDURE — 85007 BL SMEAR W/DIFF WBC COUNT: CPT | Performed by: ORTHOPAEDIC SURGERY

## 2022-12-17 PROCEDURE — 25010000002 HEPARIN (PORCINE) PER 1000 UNITS: Performed by: ORTHOPAEDIC SURGERY

## 2022-12-17 PROCEDURE — 86140 C-REACTIVE PROTEIN: CPT | Performed by: ORTHOPAEDIC SURGERY

## 2022-12-17 PROCEDURE — 99024 POSTOP FOLLOW-UP VISIT: CPT | Performed by: ORTHOPAEDIC SURGERY

## 2022-12-17 PROCEDURE — 82962 GLUCOSE BLOOD TEST: CPT

## 2022-12-17 PROCEDURE — 25010000002 MORPHINE PER 10 MG: Performed by: FAMILY MEDICINE

## 2022-12-17 PROCEDURE — 25010000002 ONDANSETRON PER 1 MG: Performed by: ORTHOPAEDIC SURGERY

## 2022-12-17 PROCEDURE — 80053 COMPREHEN METABOLIC PANEL: CPT | Performed by: ORTHOPAEDIC SURGERY

## 2022-12-17 PROCEDURE — 85027 COMPLETE CBC AUTOMATED: CPT | Performed by: ORTHOPAEDIC SURGERY

## 2022-12-17 PROCEDURE — 25010000002 CEFEPIME PER 500 MG: Performed by: ORTHOPAEDIC SURGERY

## 2022-12-17 PROCEDURE — 63710000001 INSULIN ASPART PER 5 UNITS: Performed by: ORTHOPAEDIC SURGERY

## 2022-12-17 PROCEDURE — 25010000002 HYDROMORPHONE 1 MG/ML SOLUTION: Performed by: ORTHOPAEDIC SURGERY

## 2022-12-17 PROCEDURE — 80202 ASSAY OF VANCOMYCIN: CPT | Performed by: ORTHOPAEDIC SURGERY

## 2022-12-17 RX ORDER — ALBUMIN (HUMAN) 12.5 G/50ML
12.5 SOLUTION INTRAVENOUS AS NEEDED
Status: ACTIVE | OUTPATIENT
Start: 2022-12-17 | End: 2022-12-18

## 2022-12-17 RX ORDER — LOSARTAN POTASSIUM 25 MG/1
25 TABLET ORAL DAILY
Status: DISCONTINUED | OUTPATIENT
Start: 2022-12-18 | End: 2022-12-23 | Stop reason: HOSPADM

## 2022-12-17 RX ORDER — CARVEDILOL 3.12 MG/1
3.12 TABLET ORAL 2 TIMES DAILY WITH MEALS
Status: DISCONTINUED | OUTPATIENT
Start: 2022-12-17 | End: 2022-12-18

## 2022-12-17 RX ORDER — FUROSEMIDE 40 MG/1
80 TABLET ORAL
Status: DISCONTINUED | OUTPATIENT
Start: 2022-12-18 | End: 2022-12-23 | Stop reason: HOSPADM

## 2022-12-17 RX ORDER — HEPARIN SODIUM 1000 [USP'U]/ML
4000 INJECTION, SOLUTION INTRAVENOUS; SUBCUTANEOUS AS NEEDED
Status: DISCONTINUED | OUTPATIENT
Start: 2022-12-17 | End: 2022-12-17 | Stop reason: SDUPTHER

## 2022-12-17 RX ADMIN — SEVELAMER CARBONATE 800 MG: 800 TABLET, FILM COATED ORAL at 08:39

## 2022-12-17 RX ADMIN — MEMANTINE 5 MG: 5 TABLET ORAL at 08:48

## 2022-12-17 RX ADMIN — GABAPENTIN 300 MG: 300 CAPSULE ORAL at 08:48

## 2022-12-17 RX ADMIN — METRONIDAZOLE 500 MG: 500 INJECTION, SOLUTION INTRAVENOUS at 03:12

## 2022-12-17 RX ADMIN — MORPHINE SULFATE 2 MG: 2 INJECTION, SOLUTION INTRAMUSCULAR; INTRAVENOUS at 00:06

## 2022-12-17 RX ADMIN — SEVELAMER CARBONATE 800 MG: 800 TABLET, FILM COATED ORAL at 17:50

## 2022-12-17 RX ADMIN — INSULIN ASPART 2 UNITS: 100 INJECTION, SOLUTION INTRAVENOUS; SUBCUTANEOUS at 10:49

## 2022-12-17 RX ADMIN — METRONIDAZOLE 500 MG: 500 INJECTION, SOLUTION INTRAVENOUS at 11:25

## 2022-12-17 RX ADMIN — HEPARIN SODIUM 5000 UNITS: 5000 INJECTION INTRAVENOUS; SUBCUTANEOUS at 14:48

## 2022-12-17 RX ADMIN — ATORVASTATIN CALCIUM 20 MG: 20 TABLET, FILM COATED ORAL at 20:38

## 2022-12-17 RX ADMIN — HYDROMORPHONE HYDROCHLORIDE 0.5 MG: 1 INJECTION, SOLUTION INTRAMUSCULAR; INTRAVENOUS; SUBCUTANEOUS at 18:15

## 2022-12-17 RX ADMIN — MEMANTINE 5 MG: 5 TABLET ORAL at 20:39

## 2022-12-17 RX ADMIN — INSULIN ASPART 2 UNITS: 100 INJECTION, SOLUTION INTRAVENOUS; SUBCUTANEOUS at 06:30

## 2022-12-17 RX ADMIN — Medication 10 ML: at 08:54

## 2022-12-17 RX ADMIN — DULOXETINE HYDROCHLORIDE 20 MG: 20 CAPSULE, DELAYED RELEASE ORAL at 08:48

## 2022-12-17 RX ADMIN — Medication 10 ML: at 08:55

## 2022-12-17 RX ADMIN — CEFEPIME HYDROCHLORIDE 1 G: 1 INJECTION, POWDER, FOR SOLUTION INTRAMUSCULAR; INTRAVENOUS at 17:50

## 2022-12-17 RX ADMIN — HEPARIN SODIUM 5000 UNITS: 5000 INJECTION INTRAVENOUS; SUBCUTANEOUS at 21:52

## 2022-12-17 RX ADMIN — HEPARIN SODIUM 5000 UNITS: 5000 INJECTION INTRAVENOUS; SUBCUTANEOUS at 05:09

## 2022-12-17 RX ADMIN — PANTOPRAZOLE SODIUM 40 MG: 40 TABLET, DELAYED RELEASE ORAL at 20:39

## 2022-12-17 RX ADMIN — HYDROMORPHONE HYDROCHLORIDE 0.5 MG: 1 INJECTION, SOLUTION INTRAMUSCULAR; INTRAVENOUS; SUBCUTANEOUS at 23:06

## 2022-12-17 RX ADMIN — LORAZEPAM 0.5 MG: 0.5 TABLET ORAL at 19:18

## 2022-12-17 RX ADMIN — MORPHINE SULFATE 2 MG: 2 INJECTION, SOLUTION INTRAMUSCULAR; INTRAVENOUS at 05:52

## 2022-12-17 RX ADMIN — ONDANSETRON 4 MG: 2 INJECTION INTRAMUSCULAR; INTRAVENOUS at 07:54

## 2022-12-17 RX ADMIN — Medication: at 05:10

## 2022-12-17 RX ADMIN — Medication 10 ML: at 20:39

## 2022-12-17 RX ADMIN — OXYCODONE HYDROCHLORIDE 10 MG: 10 TABLET ORAL at 19:18

## 2022-12-17 RX ADMIN — OXYCODONE HYDROCHLORIDE 10 MG: 10 TABLET ORAL at 07:54

## 2022-12-17 RX ADMIN — LORAZEPAM 0.5 MG: 0.5 TABLET ORAL at 01:06

## 2022-12-17 RX ADMIN — GABAPENTIN 300 MG: 300 CAPSULE ORAL at 20:38

## 2022-12-17 RX ADMIN — HEPARIN SODIUM 4000 UNITS: 1000 INJECTION INTRAVENOUS; SUBCUTANEOUS at 16:08

## 2022-12-17 RX ADMIN — LOSARTAN POTASSIUM 100 MG: 50 TABLET, FILM COATED ORAL at 08:48

## 2022-12-17 RX ADMIN — CARVEDILOL 3.12 MG: 3.12 TABLET, FILM COATED ORAL at 17:50

## 2022-12-17 RX ADMIN — METRONIDAZOLE 500 MG: 500 INJECTION, SOLUTION INTRAVENOUS at 20:39

## 2022-12-17 RX ADMIN — SEVELAMER CARBONATE 800 MG: 800 TABLET, FILM COATED ORAL at 11:25

## 2022-12-17 RX ADMIN — OXYCODONE HYDROCHLORIDE 10 MG: 10 TABLET ORAL at 01:13

## 2022-12-17 NOTE — OP NOTE
AMPUTATION BELOW KNEE  Procedure Note    Name:    Yamileth Slater  YOB: 1960  Date of surgery:   12/16/2022    Pre-op Diagnosis:   Left foot pain [M79.672]  MIKE and COPD overlap syndrome (HCC) [G47.33, J44.9]  Bilateral carotid artery stenosis [I65.23]  PAD (peripheral artery disease) (HCC) [I73.9]  Venous insufficiency [I87.2]  Pulmonary hypertension (HCC) [I27.20]  Coronary artery disease involving native coronary artery of native heart without angina pectoris [I25.10]  Morbid obesity with BMI of 45.0-49.9, adult (HCC) [E66.01, Z68.42]  CKD (chronic kidney disease) requiring chronic dialysis (HCC) [N18.6, Z99.2]  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 (HCC) [L97.524]  On home oxygen therapy [Z99.81]    Post-op Diagnosis:    Post-Op Diagnosis Codes:     * Left foot pain [M79.672]     * MIKE and COPD overlap syndrome (HCC) [G47.33, J44.9]     * Bilateral carotid artery stenosis [I65.23]     * PAD (peripheral artery disease) (HCC) [I73.9]     * Venous insufficiency [I87.2]     * Pulmonary hypertension (HCC) [I27.20]     * Coronary artery disease involving native coronary artery of native heart without angina pectoris [I25.10]     * Morbid obesity with BMI of 45.0-49.9, adult (HCC) [E66.01, Z68.42]     * CKD (chronic kidney disease) requiring chronic dialysis (HCC) [N18.6, Z99.2]     * 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     Anesthesia: General    Staff:   Circulator: Yadira Santa RN; Syed Zimmer RN; Lisha Weiss RN  Scrub Person: Onelia Esposito  Assistant: Ama  Angela FLORES CSA  Other: Sharon Florian    Estimated Blood Loss: 100ml    Specimens:                ID Type Source Tests Collected by Time   A (Not marked as sent) : Left below knee amputation Tissue Leg, Left TISSUE EXAM, [facility] (LUIS, COR, [facility]) Huy White MD 12/16/2022 1646         Drains:   [REMOVED] NG/OG Tube Orogastric 16 Fr Right mouth (Removed)       [REMOVED] NG/OG Tube Nasoduodenal 10 Fr Left nostril (Removed)       [REMOVED] Urethral Catheter (Removed)       [REMOVED] Urethral Catheter (Removed)       [REMOVED] Urethral Catheter Silicone 16 Fr. (Removed)       [REMOVED] Urethral Catheter 16 Fr. (Removed)       [REMOVED] External Urinary Catheter (Removed)       [REMOVED] External Urinary Catheter (Removed)       [REMOVED] External Urinary Catheter (Removed)       [REMOVED] External Urinary Catheter (Removed)       [REMOVED] External Urinary Catheter (Removed)       [REMOVED] External Urinary Catheter (Removed)       Findings:  Necrotic open wound midfoot left    Complications: None    IMPLANTS:   Nothing was implanted during the procedure      PROCEDURE:  Once consent was obtained, the patient was taken to the operating room, and once adequate anesthesia was obtained, then the Left lower extremity was prepped and draped in the standard surgical fashion.  Surgical timeout was performed and verified.  An incision was then made just past 8 cm distal to the tibial tubercle.  Equal anterior and posterior flaps were created.  Soft tissue dissection was carried down to the anterior tibial crest.  Periosteal elevator was used to expose the bone proximally and the bone cut was made between 8 and 10 cm distal to the tibial tubercle.  The fibular cut was then made approximately 2 cm proximal to that.  Soft tissue amputation was then completed posteriorly.  The leg was passed off as specimen.    The vessels were then clamped with hemostats and then sutured with a stick tie and a free tie.  The  tourniquet was then let down and hemostasis was obtained.  The wound was copiously irrigated with bacitracin and saline.  The anterior edge of the tibia was beveled with the saw blade.  A rasp was then used to smooth off any sharp edges on the bony amputation.  The wound was once again copiously irrigated with bacitracin and saline.  The deep muscle layer was then closed anterior to posterior using #1 Vicryl suture.  This was closed over the end of the tibia creating a muscle layer over the bony end.  The subcutaneous tissue was then closed anterior to posterior using 0 Vicryl and then 2-0 Vicryl.  The skin was then closed using staples.  A total of 30 staples were used to close the skin.  The wound was then covered with Xeroform gauze, 4 x 4 fluffs, Kerlix, and Ace bandages.  The dressing was then covered with a 6 inch bias stockinette with the posterior aspect of the roll cut so that there was no pressure on the posterior aspect of the leg.  The patient was then awakened and taken to the recovery room in good condition. The patient tolerated the procedure very well.        Huy White MD     Date: 12/16/2022  Time: 18:12 CST

## 2022-12-17 NOTE — PROGRESS NOTES
ORTHOPEDIC PROGRESS NOTE:    Name:  Yamileth Slater  Date:    2022  Date of admission:  12/15/2022    Post op day:  1 Day Post-Op  Procedure:    Procedure(s) (LRB):  AMPUTATION BELOW KNEE, LEFT (Left)    Subjective:    No new complaints.  No fever or chills      Vitals:     Vitals:    22 0800   BP: (!) 191/75   Pulse: 94   Resp: 26   Temp: 99 °F (37.2 °C)   SpO2: 97%      Temp (24hrs), Av.5 °F (36.4 °C), Min:95.5 °F (35.3 °C), Max:99 °F (37.2 °C)      Exam:  Awake, alert, mild confusion  Dressing clean and dry  Stable exam    Results from last 7 days   Lab Units 22  0458 22  0259   WBC 10*3/mm3 9.36 9.27   HEMOGLOBIN g/dL 11.0* 10.5*   HEMATOCRIT % 36.6 33.0*   PLATELETS 10*3/mm3 219 213     Results from last 7 days   Lab Units 22  0458   SODIUM mmol/L 134*   POTASSIUM mmol/L 6.0*   CHLORIDE mmol/L 97*   CO2 mmol/L 21.0*   BUN mg/dL 45*   CREATININE mg/dL 4.68*   CALCIUM mg/dL 9.4   BILIRUBIN mg/dL 0.5   ALK PHOS U/L 307*   ALT (SGPT) U/L 13   AST (SGOT) U/L 18   GLUCOSE mg/dL 180*         ASSESSMENT:  Active Hospital Problems    Diagnosis  POA   • **Venous insufficiency [I87.2]  Yes   • Left foot pain [M79.672]  Yes     Added automatically from request for surgery [id number]     • 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 (HCC) [L97.524]  Yes     Added automatically from request for surgery [id number]     • On home oxygen therapy [Z99.81]  Not Applicable     Added automatically from request for surgery [id number]     • ESRD on hemodialysis (HCC) [N18.6, Z99.2]  Not Applicable     -Receives hemodialysis MWF at [facility] in [place]  Missed dialysis per family prior to arrival   Nephrology consulted appreciated recs          • MIKE and COPD overlap syndrome (HCC) [G47.33, J44.9]  Yes     -Home Trilogy/CPAP continue   -Home 3L oxygen dependent. Maintain strict Sats between 88-92%       • Pulmonary hypertension (HCC) [I27.20]  Yes   • Coronary artery disease involving native  coronary artery of native heart without angina pectoris [I25.10]  Yes     - S/P CABG, Bilateral carotid artery stenosis w/ 2 stents on left side,  PAD (peripheral artery disease) with stent in right upper leg  - Cardiology on board       • Morbid obesity with BMI of 50.0-59.9, adult ([facility]) [E66.01, Z68.43]  Not Applicable     Body mass index is 56.52 kg/m².  - Consistent carb, cardiac, renal diet     • Carotid artery disease ([facility]) [I77.9]  Yes     -Status post stenting  -Continue home medications     • PAD (peripheral artery disease) ([facility]) [I73.9]  Yes     · Continue ASA 81mg, Clopidogrel 75mg, Atorvastatin 40mg           PLAN:    Continue medical management  Plan dressing change starting tomorrow  Iv antibiotics for 5 days postop for amputation  Mobility as tolerated once mental status allows for following commands and working with PT/OT    12/17/22 at 09:14 CST by Huy White MD

## 2022-12-17 NOTE — PROGRESS NOTES
Pharmacokinetics by Pharmacy - Vancomycin    Yamileth Slater is a 62 y.o. female receiving vancomycin (day 3) for SSTI.  Patient is also receiving cefepime.    Objective:    Temp Readings from Last 1 Encounters:   12/17/22 99 °F (37.2 °C) (Oral)     WBC   Date Value Ref Range Status   12/17/2022 9.36 3.40 - 10.80 10*3/mm3 Final   12/16/2022 9.27 3.40 - 10.80 10*3/mm3 Final    No results found for: CRP, LACTATE   Creatinine   Date Value Ref Range Status   12/17/2022 4.68 (H) 0.57 - 1.00 mg/dL Final   12/16/2022 6.27 (H) 0.57 - 1.00 mg/dL Final   12/15/2022 5.74 (H) 0.57 - 1.00 mg/dL Final       Vancomycin Random   Date Value Ref Range Status   12/17/2022 20.00 5.00 - 40.00 mcg/mL Final   12/16/2022 22.20 5.00 - 40.00 mcg/mL Final       Culture Results:  Microbiology Results (last 10 days)     Procedure Component Value - Date/Time    Urine Culture - Urine, Straight Cath [[id number]]  (Normal) Collected: 12/16/22 1240    Lab Status: Final result Specimen: Urine from Straight Cath Updated: 12/17/22 0841     Urine Culture No growth    Blood Culture - Blood, Arm, Left [[id number]]  (Normal) Collected: 12/15/22 1818    Lab Status: Preliminary result Specimen: Blood from Arm, Left Updated: 12/16/22 1930     Blood Culture No growth at 24 hours    Blood Culture - Blood, Arm, Left [[id number]]  (Normal) Collected: 12/15/22 1818    Lab Status: Preliminary result Specimen: Blood from Arm, Left Updated: 12/16/22 1930     Blood Culture No growth at 24 hours        No results found for: RESPCX    [Ht: 157.5 cm (62.01\"); Wt: 126 kg (277 lb 12.5 oz)]   Body mass index is 50.79 kg/m².  Ideal body weight: 50.1 kg (110 lb 7.9 oz)  Adjusted ideal body weight: 80.5 kg (177 lb 6.5 oz)      Assessment:  • WBCs WNL, afebrile  • HD MWF. Last dialysis session Friday. HD again today for 2.5 hours   • Cultures  o 12/15 blood culture NGD1  o 12/16 urine culture no growth   • Vancomycin random resulted at 20 mcg/mL this morning @ 0458.   • Goal  trough is 10-20 mcg/mL. Due to dialysis, patient does not meet AUC dosing.       Plan:  1. Pulse dosing. No vancomycin dose today.  2. Vancomycin random to be collected tomorrow morning. Consulted until 12/22.  3. Pharmacy will monitor renal function and adjust dose accordingly.    Thank you for this consult.     Rboin Meza [facility]   12/17/22 09:36 CST

## 2022-12-17 NOTE — PROGRESS NOTES
[facility]  [address] [place] [address]. [address]  T - [phone]  F - [phone]     Progress Note          PATIENT  DEMOGRAPHICS   PATIENT NAME: Yamileth Slater                      PHYSICIAN: Ace Lauren MD  : 1960  MRN: [id number]   LOS: 2 days    Patient Care Team:  Kiersten Wilson APRN as PCP - General (Family Medicine)  Subjective   SUBJECTIVE   Feels well, comfortable         Objective   OBJECTIVE   Vital Signs  Temp:  [95.5 °F (35.3 °C)-99 °F (37.2 °C)] 99 °F (37.2 °C)  Heart Rate:  [80-98] 94  Resp:  [15-28] 26  BP: ()/(50-96) 191/75  Arterial Line BP: (171-208)/() 194/83  FiO2 (%):  [60 %-100 %] 60 %    Flowsheet Rows    Flowsheet Row First Filed Value   Admission Height 157.5 cm (62\") Documented at 12/15/2022 1700   Admission Weight 124 kg (273 lb 4.8 oz) Documented at 12/15/2022 1700           I/O last 3 completed shifts:  In: 300 [IV Piggyback:300]  Out: 5350 [Urine:1550; Other:3500; Stool:300]    PHYSICAL EXAM    Physical Exam  Constitutional:       Appearance: She is well-developed.   HENT:      Head: Normocephalic.   Eyes:      Pupils: Pupils are equal, round, and reactive to light.   Cardiovascular:      Rate and Rhythm: Normal rate and regular rhythm.      Heart sounds: Normal heart sounds.   Pulmonary:      Effort: Pulmonary effort is normal.      Breath sounds: Normal breath sounds.   Abdominal:      General: Bowel sounds are normal.      Palpations: Abdomen is soft.   Musculoskeletal:      Comments: Left bka    Skin:     Coloration: Skin is not jaundiced.   Neurological:      General: No focal deficit present.      Mental Status: She is alert and oriented to person, place, and time.         RESULTS   Results Review:    Results from last 7 days   Lab Units 22  0458 22  1409 22  0259 12/15/22  2233   SODIUM mmol/L 134*  --  135* 135*   POTASSIUM mmol/L 6.0* 5.0 7.7* 8.3*   CHLORIDE mmol/L 97*  --  99 99   CO2 mmol/L  21.0*  --  22.0 22.0   BUN mg/dL 45*  --  72* 75*   CREATININE mg/dL 4.68*  --  6.27* 5.74*   CALCIUM mg/dL 9.4  --  9.2 9.4   BILIRUBIN mg/dL 0.5  --  0.4  --    ALK PHOS U/L 307*  --  297*  --    ALT (SGPT) U/L 13  --  16  --    AST (SGOT) U/L 18  --  23  --    GLUCOSE mg/dL 180*  --  100* 121*       Estimated Creatinine Clearance: 15.6 mL/min (A) (by C-G formula based on SCr of 4.68 mg/dL (H)).    Results from last 7 days   Lab Units 12/16/22  0259   MAGNESIUM mg/dL 1.9   PHOSPHORUS mg/dL 8.9*             Results from last 7 days   Lab Units 12/17/22  0458 12/16/22  0259   WBC 10*3/mm3 9.36 9.27   HEMOGLOBIN g/dL 11.0* 10.5*   PLATELETS 10*3/mm3 219 213               Imaging Results (Last 24 Hours)     Procedure Component Value Units Date/Time    XR Chest 1 View [[id number]] Collected: 12/16/22 1804     Updated: 12/16/22 1856    Narrative:      PROCEDURE: XR CHEST 1 VIEW, 12/16/2022 6:04 PM CST    CLINICAL INDICATION:    Post-Op, I87.2 Venous insufficiency  (chronic) (peripheral) I27.20 Pulmonary hypertension, unspecified  M79.672 Pain in left foot I73.9 Peripheral vascular disease,  unspecified Z99.81 Dependence on supplemental oxygen N18.6 End  stage renal disease Z99.2 Dependence on renal dialysis I65.23  Occlusion and stenosis of bilateral carotid arteries E66.01  Morbid (severe) obesity due to excess calories Z68.4.    COMPARISON: 7/1/2022    TECHNIQUE:  AP portable radiograph of chest     FINDINGS:    Unchanged left neck catheter and right neck catheter. Interval  removal of enteric tube.    Cardiac silhouette is stable. Moderate interstitial prominence  and hazy retrocardiac opacification. No pneumothorax.      Impression:        Moderate interstitial prominence may reflect interstitial edema  or infectious processes.    Hazy retrocardiac opacification may reflect edema, infiltrate, or  atelectasis.        Electronically signed by:  Jm Mckeon MD  12/16/2022 6:54 PM  [facility] Workstation: [id number]            MEDICATIONS    atorvastatin, 20 mg, Oral, Nightly  cefepime, 1 g, Intravenous, Q24H  DULoxetine, 20 mg, Oral, Daily  epoetin hubert/hubert-epbx, 10,000 Units, Intravenous, Once per day on Mon Wed Fri  gabapentin, 300 mg, Oral, Once per day on Mon Wed Fri   And  gabapentin, 300 mg, Oral, 2 times per day on Sun Tue Thu Sat  heparin (porcine), 5,000 Units, Subcutaneous, Q8H  Insulin Aspart, 0-9 Units, Subcutaneous, TID AC  losartan, 100 mg, Oral, Daily  memantine, 5 mg, Oral, BID  metroNIDAZOLE, 500 mg, Intravenous, Q8H  pantoprazole, 40 mg, Oral, Nightly  sevelamer, 800 mg, Oral, TID With Meals  sodium chloride, 10 mL, Intravenous, Q12H      O2, 2 L/min  Pharmacy to dose vancomycin,   sodium chloride, 1,000 mL        Assessment & Plan   ASSESSMENT / PLAN      Venous insufficiency    Morbid obesity with BMI of 50.0-59.9, adult ([facility])    Coronary artery disease involving native coronary artery of native heart without angina pectoris    Carotid artery disease ([facility])    PAD (peripheral artery disease) ([facility])    Pulmonary hypertension ([facility])    MIKE and COPD overlap syndrome ([facility])    ESRD on hemodialysis ([facility])    Left foot pain    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 ([facility])    On home oxygen therapy    1.end-stage kidney disease on hemodialysis since October 2021.  She is currently dialyzing at [facility]. her current access is tunneled catheter.  She has prior right upper extremity failed AV fistula. She had dialysis on Friday early because of high k and prepare her for surgery     k again 6 today. Lower losartan to 25mg hd. Add coreg. Hd today 2.5 hrs with 1 k bath and uf of 3-3.5L    Add diuretic non dialysis days     2- anemia chronic kidney disease patient has history of B12 deficiency and AV malformation.  hemoglobin is more than 10     3.diabetes mellitus type 2 with nonhealing wound on the left foot and now  left BKA     4. CAD     5. ckd mbd on sevelamer in out patient setting     6. htn stable on  losartan                   This document has been electronically signed by Ace Lauren MD on December 17, 2022 10:47 CST

## 2022-12-17 NOTE — ANESTHESIA POSTPROCEDURE EVALUATION
Patient: Yamileth Slater    Procedure Summary     Date: 12/16/22 Room / Location: [facility] OR 06 / [facility] OR    Anesthesia Start: 1551 Anesthesia Stop: 1807    Procedure: AMPUTATION BELOW KNEE, LEFT (Left: Leg Lower) Diagnosis:       Left foot pain      MIKE and COPD overlap syndrome (HCC)      Bilateral carotid artery stenosis      PAD (peripheral artery disease) (HCC)      Venous insufficiency      Pulmonary hypertension (HCC)      Coronary artery disease involving native coronary artery of native heart without angina pectoris      Morbid obesity with BMI of 45.0-49.9, adult (HCC)      CKD (chronic kidney disease) requiring chronic dialysis (HCC)      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 (HCC)      On home oxygen therapy      (Left foot pain [M79.672])      (MIKE and COPD overlap syndrome (HCC) [G47.33, J44.9])      (Bilateral carotid artery stenosis [I65.23])      (PAD (peripheral artery disease) (HCC) [I73.9])      (Venous insufficiency [I87.2])      (Pulmonary hypertension (HCC) [I27.20])      (Coronary artery disease involving native coronary artery of native heart without angina pectoris [I25.10])      (Morbid obesity with BMI of 45.0-49.9, adult (HCC) [E66.01, Z68.42])      (CKD (chronic kidney disease) requiring chronic dialysis (HCC) [N18.6, Z99.2])      (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          Anesthesia Post Evaluation

## 2022-12-17 NOTE — PROGRESS NOTES
[facility] Medicine Services  INPATIENT PROGRESS NOTE    Length of Stay: 2  Date of Admission: 12/15/2022  Primary Care Physician: Kiersten Wilson APRN    Subjective   Chief Complaint: confusion  HPI:    Still having some slight confusion she is status post surgery, doing stable afterwards.    Review of Systems   Constitutional: Negative for fever.   HENT: Negative for congestion.    Respiratory: Negative for shortness of breath.    Cardiovascular: Negative for chest pain.   Gastrointestinal: Negative for abdominal distention.   Endocrine: Negative for polyuria.   Genitourinary: Negative for difficulty urinating.   Musculoskeletal: Negative for arthralgias.   Skin: Negative for color change.   Allergic/Immunologic: Negative for environmental allergies.   Neurological: Negative for dizziness.   Hematological: Negative for adenopathy.   Psychiatric/Behavioral: Negative for agitation.          All pertinent negatives and positives are as above. All other systems have been reviewed and are negative unless otherwise stated.     Objective    Temp:  [95.5 °F (35.3 °C)-99 °F (37.2 °C)] 98.6 °F (37 °C)  Heart Rate:  [82-98] 96  Resp:  [12-28] 12  BP: (128-191)/(60-98) 146/71  Arterial Line BP: (171-209)/() 209/91  FiO2 (%):  [60 %-100 %] 60 %  Physical Exam  Vitals and nursing note reviewed.   Constitutional:       General: She is not in acute distress.     Appearance: She is well-developed. She is not diaphoretic.   HENT:      Head: Normocephalic and atraumatic.   Cardiovascular:      Rate and Rhythm: Normal rate.   Pulmonary:      Effort: Pulmonary effort is normal. No respiratory distress.      Breath sounds: No wheezing.   Abdominal:      General: There is no distension.      Palpations: Abdomen is soft.   Musculoskeletal:         General: Normal range of motion.   Skin:     General: Skin is warm and dry.   Neurological:      Mental Status: She is alert.      Cranial  Nerves: No cranial nerve deficit.   Psychiatric:         Behavior: Behavior normal.         Thought Content: Thought content normal.         Judgment: Judgment normal.             Results Review:  I have reviewed the labs, radiology results, and diagnostic studies.    Laboratory Data:   Lab Results (last 24 hours)     Procedure Component Value Units Date/Time    POC Glucose Once [[id number]]  (Abnormal) Collected: 12/17/22 1040    Specimen: Blood Updated: 12/17/22 1119     Glucose 156 mg/dL      Comment: RN NotifiedOperator: [id number] SAMANTHA REBAMeter ID: [id number]       C-reactive Protein [[id number]]  (Abnormal) Collected: 12/17/22 0458    Specimen: Blood Updated: 12/17/22 1001     C-Reactive Protein 14.49 mg/dL     Urine Culture - Urine, Straight Cath [[id number]]  (Normal) Collected: 12/16/22 1240    Specimen: Urine from Straight Cath Updated: 12/17/22 0841     Urine Culture No growth    POC Glucose Once [[id number]]  (Abnormal) Collected: 12/16/22 1806    Specimen: Blood Updated: 12/17/22 0820     Glucose 142 mg/dL      Comment: : [id number] [facility]  LYNNEMeter ID: [id number]       CBC & Differential [[id number]]  (Abnormal) Collected: 12/17/22 0458    Specimen: Blood Updated: 12/17/22 0727    Narrative:      The following orders were created for panel order CBC & Differential.  Procedure                               Abnormality         Status                     ---------                               -----------         ------                     Manual Differential[[id number]]                              Final result               CBC Auto Differential[[id number]]        Abnormal            Final result                 Please view results for these tests on the individual orders.    Manual Differential [[id number]] Collected: 12/17/22 0458    Specimen: Blood Updated: 12/17/22 0727     Neutrophil % 69.0 %      Lymphocyte % 22.0 %      Monocyte % 7.0 %      Bands %  2.0 %      Neutrophils Absolute  6.65 10*3/mm3      Lymphocytes Absolute 2.06 10*3/mm3      Monocytes Absolute 0.66 10*3/mm3      Anisocytosis Mod/2+     Hypochromia Slight/1+     WBC Morphology Normal     Platelet Estimate Adequate    Comprehensive Metabolic Panel [[id number]]  (Abnormal) Collected: 12/17/22 0458    Specimen: Blood Updated: 12/17/22 0534     Glucose 180 mg/dL      BUN 45 mg/dL      Creatinine 4.68 mg/dL      Sodium 134 mmol/L      Potassium 6.0 mmol/L      Chloride 97 mmol/L      CO2 21.0 mmol/L      Calcium 9.4 mg/dL      Total Protein 8.6 g/dL      Albumin 3.60 g/dL      ALT (SGPT) 13 U/L      AST (SGOT) 18 U/L      Alkaline Phosphatase 307 U/L      Total Bilirubin 0.5 mg/dL      Globulin 5.0 gm/dL      A/G Ratio 0.7 g/dL      BUN/Creatinine Ratio 9.6     Anion Gap 16.0 mmol/L      eGFR 10.0 mL/min/1.73      Comment: <15 Indicative of kidney failure       Narrative:      GFR Normal >60  Chronic Kidney Disease <60  Kidney Failure <15      Vancomycin, Random [[id number]]  (Normal) Collected: 12/17/22 0458    Specimen: Blood Updated: 12/17/22 0534     Vancomycin Random 20.00 mcg/mL     CBC Auto Differential [[id number]]  (Abnormal) Collected: 12/17/22 0458    Specimen: Blood Updated: 12/17/22 0517     WBC 9.36 10*3/mm3      RBC 4.16 10*6/mm3      Hemoglobin 11.0 g/dL      Hematocrit 36.6 %      MCV 88.0 fL      MCH 26.4 pg      MCHC 30.1 g/dL      RDW 16.4 %      RDW-SD 53.0 fl      MPV 8.4 fL      Platelets 219 10*3/mm3     Blood Culture - Blood, Arm, Left [[id number]]  (Normal) Collected: 12/15/22 1818    Specimen: Blood from Arm, Left Updated: 12/16/22 1930     Blood Culture No growth at 24 hours    Blood Culture - Blood, Arm, Left [[id number]]  (Normal) Collected: 12/15/22 1818    Specimen: Blood from Arm, Left Updated: 12/16/22 1930     Blood Culture No growth at 24 hours          Culture Data:   Blood Culture   Date Value Ref Range Status   12/15/2022 No growth at 24 hours  Preliminary   12/15/2022 No growth at 24 hours   Preliminary     Urine Culture   Date Value Ref Range Status   12/16/2022 No growth  Final     No results found for: RESPCX  No results found for: WOUNDCX  No results found for: STOOLCX  No components found for: BODYFLD    Radiology Data:   Imaging Results (Last 24 Hours)     Procedure Component Value Units Date/Time    XR Chest 1 View [[id number]] Collected: 12/16/22 1804     Updated: 12/16/22 1856    Narrative:      PROCEDURE: XR CHEST 1 VIEW, 12/16/2022 6:04 PM CST    CLINICAL INDICATION:    Post-Op, I87.2 Venous insufficiency  (chronic) (peripheral) I27.20 Pulmonary hypertension, unspecified  M79.672 Pain in left foot I73.9 Peripheral vascular disease,  unspecified Z99.81 Dependence on supplemental oxygen N18.6 End  stage renal disease Z99.2 Dependence on renal dialysis I65.23  Occlusion and stenosis of bilateral carotid arteries E66.01  Morbid (severe) obesity due to excess calories Z68.4.    COMPARISON: 7/1/2022    TECHNIQUE:  AP portable radiograph of chest     FINDINGS:    Unchanged left neck catheter and right neck catheter. Interval  removal of enteric tube.    Cardiac silhouette is stable. Moderate interstitial prominence  and hazy retrocardiac opacification. No pneumothorax.      Impression:        Moderate interstitial prominence may reflect interstitial edema  or infectious processes.    Hazy retrocardiac opacification may reflect edema, infiltrate, or  atelectasis.        Electronically signed by:  Jm Mckeon MD  12/16/2022 6:54 PM  CST Workstation: [id number]          I have reviewed the patient's current medications.     Assessment/Plan     Active Hospital Problems    Diagnosis    • **Venous insufficiency    • Left foot pain    • 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 (HCC)    • On home oxygen therapy    • ESRD on hemodialysis (HCC)    • MIKE and COPD overlap syndrome (HCC)    • Pulmonary hypertension (HCC)    • Coronary artery disease involving native coronary artery of native heart  without angina pectoris    • Morbid obesity with BMI of 50.0-59.9, adult (HCC)    • Carotid artery disease (HCC)    • PAD (peripheral artery disease) (HCC)      Left foot necrosis.  Place the patient on IV antibiotics.  Cefepime, vancomycin pharmacy to dose, and Flagyl.  Obtain blood cultures.  Continue follow labs.  CBC complete metabolic profile tonight and in a.m.  Check chest x-ray and EKG.  Pharmacy to dose vancomycin.  Consult Dr. White for surgical intervention.  Below-knee amputation.  She is status post BKA.  We will continue with antibiotics for few more days.     End-stage renal disease.  Consult Dr. Gupta for arrangements for dialysis which is usually completed Monday Wednesday and Friday.  Continue with dialysis as per nephrology     Insulin-dependent diabetes mellitus type 2.  We will check A1c.  Check TSH.  Continue to monitor     Hyperlipidemia continue Lipitor.     Diabetic neuropathy continue Cymbalta and gabapentin     Hypertension continue losartan     Chronic pain continue oxycodone     GERD continue Protonix     Morbid obesity BMI is 47.  Continue nutritional support.     DVT prophylaxis Heparin     CODE STATUS full code      Simeon Peña MD   12/17/22   16:03 CST

## 2022-12-17 NOTE — ANESTHESIA POSTPROCEDURE EVALUATION
Patient: Yamileth Slater    Procedure Summary     Date: 12/16/22 Room / Location: [facility] OR 06 / [facility] OR    Anesthesia Start: 1551 Anesthesia Stop: 1807    Procedure: AMPUTATION BELOW KNEE, LEFT (Left: Leg Lower) Diagnosis:       Left foot pain      MIKE and COPD overlap syndrome (HCC)      Bilateral carotid artery stenosis      PAD (peripheral artery disease) (HCC)      Venous insufficiency      Pulmonary hypertension (HCC)      Coronary artery disease involving native coronary artery of native heart without angina pectoris      Morbid obesity with BMI of 45.0-49.9, adult (HCC)      CKD (chronic kidney disease) requiring chronic dialysis (HCC)      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 (HCC)      On home oxygen therapy      (Left foot pain [M79.672])      (MIKE and COPD overlap syndrome (HCC) [G47.33, J44.9])      (Bilateral carotid artery stenosis [I65.23])      (PAD (peripheral artery disease) (HCC) [I73.9])      (Venous insufficiency [I87.2])      (Pulmonary hypertension (HCC) [I27.20])      (Coronary artery disease involving native coronary artery of native heart without angina pectoris [I25.10])      (Morbid obesity with BMI of 45.0-49.9, adult (HCC) [E66.01, Z68.42])      (CKD (chronic kidney disease) requiring chronic dialysis (HCC) [N18.6, Z99.2])      (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          Post Anesthesia Care and Evaluation    Patient location during evaluation: PACU  Patient participation: complete - patient participated  Level of consciousness: awake and alert  Pain score: 4  Pain management: adequate    Airway patency: patent  Anesthetic complications: No anesthetic complications  PONV Status:  none  Cardiovascular status: acceptable and hemodynamically stable  Respiratory status: acceptable and nasal cannula  Hydration status: acceptable

## 2022-12-17 NOTE — SIGNIFICANT NOTE
12/17/22 5589   OTHER   Discipline physical therapist;occupational therapist   Rehab Time/Intention   Session Not Performed other (see comments)   PT/OT eval attempted. Nursing deferred secondary to confusion and in restraints. Will f/u as time allows.

## 2022-12-17 NOTE — PLAN OF CARE
Goal Outcome Evaluation:  Plan of Care Reviewed With: patient, sibling        Progress: no change   Remains confused, attempting to pull lines out and remove dressings. Required dialysis today. VSS. Urine output is WNL (patient voids frequently even while on dialysis) external urinary catheter in use. Remains in isolation for VRE,MRSA. Stable. Will continue to monitor.

## 2022-12-18 LAB
ALBUMIN SERPL-MCNC: 3.8 G/DL (ref 3.5–5.2)
ALBUMIN/GLOB SERPL: 0.7 G/DL
ALP SERPL-CCNC: 306 U/L (ref 39–117)
ALT SERPL W P-5'-P-CCNC: 6 U/L (ref 1–33)
ANION GAP SERPL CALCULATED.3IONS-SCNC: 19 MMOL/L (ref 5–15)
AST SERPL-CCNC: 18 U/L (ref 1–32)
BASOPHILS # BLD MANUAL: 0.21 10*3/MM3 (ref 0–0.2)
BASOPHILS NFR BLD MANUAL: 2 % (ref 0–1.5)
BILIRUB SERPL-MCNC: 0.5 MG/DL (ref 0–1.2)
BUN SERPL-MCNC: 39 MG/DL (ref 8–23)
BUN/CREAT SERPL: 9.3 (ref 7–25)
CALCIUM SPEC-SCNC: 10.1 MG/DL (ref 8.6–10.5)
CHLORIDE SERPL-SCNC: 91 MMOL/L (ref 98–107)
CO2 SERPL-SCNC: 22 MMOL/L (ref 22–29)
CREAT SERPL-MCNC: 4.19 MG/DL (ref 0.57–1)
CRP SERPL-MCNC: 16.43 MG/DL (ref 0–0.5)
DEPRECATED RDW RBC AUTO: 51.2 FL (ref 37–54)
DEPRECATED RDW RBC AUTO: 51.6 FL (ref 37–54)
EGFRCR SERPLBLD CKD-EPI 2021: 11.4 ML/MIN/1.73
EOSINOPHIL # BLD MANUAL: 0.1 10*3/MM3 (ref 0–0.4)
EOSINOPHIL NFR BLD MANUAL: 1 % (ref 0.3–6.2)
ERYTHROCYTE [DISTWIDTH] IN BLOOD BY AUTOMATED COUNT: 15.9 % (ref 12.3–15.4)
ERYTHROCYTE [DISTWIDTH] IN BLOOD BY AUTOMATED COUNT: 16.1 % (ref 12.3–15.4)
GLOBULIN UR ELPH-MCNC: 5.5 GM/DL
GLUCOSE BLDC GLUCOMTR-MCNC: 153 MG/DL (ref 70–130)
GLUCOSE BLDC GLUCOMTR-MCNC: 185 MG/DL (ref 70–130)
GLUCOSE BLDC GLUCOMTR-MCNC: 187 MG/DL (ref 70–130)
GLUCOSE BLDC GLUCOMTR-MCNC: 188 MG/DL (ref 70–130)
GLUCOSE SERPL-MCNC: 172 MG/DL (ref 65–99)
HCT VFR BLD AUTO: 41.4 % (ref 34–46.6)
HCT VFR BLD AUTO: 42.2 % (ref 34–46.6)
HGB BLD-MCNC: 12.7 G/DL (ref 12–15.9)
HGB BLD-MCNC: 12.8 G/DL (ref 12–15.9)
HOLD SPECIMEN: NORMAL
LYMPHOCYTES # BLD MANUAL: 2.31 10*3/MM3 (ref 0.7–3.1)
LYMPHOCYTES NFR BLD MANUAL: 9 % (ref 5–12)
MCH RBC QN AUTO: 26.6 PG (ref 26.6–33)
MCH RBC QN AUTO: 26.7 PG (ref 26.6–33)
MCHC RBC AUTO-ENTMCNC: 30.3 G/DL (ref 31.5–35.7)
MCHC RBC AUTO-ENTMCNC: 30.7 G/DL (ref 31.5–35.7)
MCV RBC AUTO: 87 FL (ref 79–97)
MCV RBC AUTO: 87.7 FL (ref 79–97)
MONOCYTES # BLD: 0.94 10*3/MM3 (ref 0.1–0.9)
NEUTROPHILS # BLD AUTO: 6.92 10*3/MM3 (ref 1.7–7)
NEUTROPHILS NFR BLD MANUAL: 66 % (ref 42.7–76)
PLAT MORPH BLD: NORMAL
PLATELET # BLD AUTO: 253 10*3/MM3 (ref 140–450)
PLATELET # BLD AUTO: 261 10*3/MM3 (ref 140–450)
PMV BLD AUTO: 8.1 FL (ref 6–12)
PMV BLD AUTO: 8.4 FL (ref 6–12)
POTASSIUM SERPL-SCNC: 4.6 MMOL/L (ref 3.5–5.2)
PROT SERPL-MCNC: 9.3 G/DL (ref 6–8.5)
RBC # BLD AUTO: 4.76 10*6/MM3 (ref 3.77–5.28)
RBC # BLD AUTO: 4.81 10*6/MM3 (ref 3.77–5.28)
RBC MORPH BLD: NORMAL
SODIUM SERPL-SCNC: 132 MMOL/L (ref 136–145)
VANCOMYCIN SERPL-MCNC: 16.2 MCG/ML (ref 5–40)
VARIANT LYMPHS NFR BLD MANUAL: 22 % (ref 19.6–45.3)
WBC MORPH BLD: NORMAL
WBC NRBC COR # BLD: 10.48 10*3/MM3 (ref 3.4–10.8)
WBC NRBC COR # BLD: 10.68 10*3/MM3 (ref 3.4–10.8)

## 2022-12-18 PROCEDURE — 86140 C-REACTIVE PROTEIN: CPT | Performed by: ORTHOPAEDIC SURGERY

## 2022-12-18 PROCEDURE — 80202 ASSAY OF VANCOMYCIN: CPT | Performed by: ORTHOPAEDIC SURGERY

## 2022-12-18 PROCEDURE — 80053 COMPREHEN METABOLIC PANEL: CPT | Performed by: ORTHOPAEDIC SURGERY

## 2022-12-18 PROCEDURE — 99024 POSTOP FOLLOW-UP VISIT: CPT | Performed by: ORTHOPAEDIC SURGERY

## 2022-12-18 PROCEDURE — 25010000002 HYDROMORPHONE 1 MG/ML SOLUTION: Performed by: ORTHOPAEDIC SURGERY

## 2022-12-18 PROCEDURE — 25010000002 VANCOMYCIN 10 G RECONSTITUTED SOLUTION: Performed by: INTERNAL MEDICINE

## 2022-12-18 PROCEDURE — 82962 GLUCOSE BLOOD TEST: CPT

## 2022-12-18 PROCEDURE — 25010000002 HEPARIN (PORCINE) PER 1000 UNITS: Performed by: ORTHOPAEDIC SURGERY

## 2022-12-18 PROCEDURE — 85027 COMPLETE CBC AUTOMATED: CPT | Performed by: ORTHOPAEDIC SURGERY

## 2022-12-18 PROCEDURE — 63710000001 INSULIN ASPART PER 5 UNITS: Performed by: ORTHOPAEDIC SURGERY

## 2022-12-18 PROCEDURE — 25010000002 CEFEPIME PER 500 MG: Performed by: ORTHOPAEDIC SURGERY

## 2022-12-18 PROCEDURE — 85007 BL SMEAR W/DIFF WBC COUNT: CPT | Performed by: ORTHOPAEDIC SURGERY

## 2022-12-18 RX ORDER — CARVEDILOL 6.25 MG/1
6.25 TABLET ORAL 2 TIMES DAILY WITH MEALS
Status: DISCONTINUED | OUTPATIENT
Start: 2022-12-18 | End: 2022-12-23 | Stop reason: HOSPADM

## 2022-12-18 RX ADMIN — HEPARIN SODIUM 5000 UNITS: 5000 INJECTION INTRAVENOUS; SUBCUTANEOUS at 16:04

## 2022-12-18 RX ADMIN — HEPARIN SODIUM 5000 UNITS: 5000 INJECTION INTRAVENOUS; SUBCUTANEOUS at 21:38

## 2022-12-18 RX ADMIN — Medication 10 ML: at 20:33

## 2022-12-18 RX ADMIN — CARVEDILOL 6.25 MG: 6.25 TABLET, FILM COATED ORAL at 17:58

## 2022-12-18 RX ADMIN — CARVEDILOL 3.12 MG: 3.12 TABLET, FILM COATED ORAL at 09:06

## 2022-12-18 RX ADMIN — INSULIN ASPART 2 UNITS: 100 INJECTION, SOLUTION INTRAVENOUS; SUBCUTANEOUS at 06:40

## 2022-12-18 RX ADMIN — SEVELAMER CARBONATE 800 MG: 800 TABLET, FILM COATED ORAL at 17:58

## 2022-12-18 RX ADMIN — METRONIDAZOLE 500 MG: 500 INJECTION, SOLUTION INTRAVENOUS at 11:47

## 2022-12-18 RX ADMIN — SEVELAMER CARBONATE 800 MG: 800 TABLET, FILM COATED ORAL at 09:05

## 2022-12-18 RX ADMIN — METRONIDAZOLE 500 MG: 500 INJECTION, SOLUTION INTRAVENOUS at 04:04

## 2022-12-18 RX ADMIN — GABAPENTIN 300 MG: 300 CAPSULE ORAL at 20:32

## 2022-12-18 RX ADMIN — FUROSEMIDE 80 MG: 40 TABLET ORAL at 09:06

## 2022-12-18 RX ADMIN — HEPARIN SODIUM 5000 UNITS: 5000 INJECTION INTRAVENOUS; SUBCUTANEOUS at 05:28

## 2022-12-18 RX ADMIN — PANTOPRAZOLE SODIUM 40 MG: 40 TABLET, DELAYED RELEASE ORAL at 20:32

## 2022-12-18 RX ADMIN — METRONIDAZOLE 500 MG: 500 INJECTION, SOLUTION INTRAVENOUS at 20:32

## 2022-12-18 RX ADMIN — HYDROMORPHONE HYDROCHLORIDE 0.5 MG: 1 INJECTION, SOLUTION INTRAMUSCULAR; INTRAVENOUS; SUBCUTANEOUS at 13:47

## 2022-12-18 RX ADMIN — LOSARTAN POTASSIUM 25 MG: 25 TABLET, FILM COATED ORAL at 09:06

## 2022-12-18 RX ADMIN — ATORVASTATIN CALCIUM 20 MG: 20 TABLET, FILM COATED ORAL at 20:32

## 2022-12-18 RX ADMIN — CEFEPIME HYDROCHLORIDE 1 G: 1 INJECTION, POWDER, FOR SOLUTION INTRAMUSCULAR; INTRAVENOUS at 17:58

## 2022-12-18 RX ADMIN — INSULIN ASPART 2 UNITS: 100 INJECTION, SOLUTION INTRAVENOUS; SUBCUTANEOUS at 12:00

## 2022-12-18 RX ADMIN — INSULIN ASPART 2 UNITS: 100 INJECTION, SOLUTION INTRAVENOUS; SUBCUTANEOUS at 17:57

## 2022-12-18 RX ADMIN — SEVELAMER CARBONATE 800 MG: 800 TABLET, FILM COATED ORAL at 11:48

## 2022-12-18 RX ADMIN — MEMANTINE 5 MG: 5 TABLET ORAL at 20:32

## 2022-12-18 RX ADMIN — MEMANTINE 5 MG: 5 TABLET ORAL at 09:06

## 2022-12-18 RX ADMIN — DULOXETINE HYDROCHLORIDE 20 MG: 20 CAPSULE, DELAYED RELEASE ORAL at 09:06

## 2022-12-18 RX ADMIN — Medication: at 06:05

## 2022-12-18 RX ADMIN — VANCOMYCIN HYDROCHLORIDE 500 MG: 10 INJECTION, POWDER, LYOPHILIZED, FOR SOLUTION INTRAVENOUS at 18:29

## 2022-12-18 RX ADMIN — GABAPENTIN 300 MG: 300 CAPSULE ORAL at 09:06

## 2022-12-18 NOTE — SIGNIFICANT NOTE
12/18/22 1007   OTHER   Discipline physical therapist;occupational therapist   Rehab Time/Intention   Session Not Performed other (see comments)   Recommendation   PT - Next Appointment 12/19/22   Recommendation   OT - Next Appointment 12/19/22     Initial PT/OT evaluations attempted but deferred after consultation with RN.  Patient still restrained, combative when touched.

## 2022-12-18 NOTE — NURSING NOTE
Patient remains confused but is answering some questions correctly. She does swallow pills good with sips of water.

## 2022-12-18 NOTE — PROGRESS NOTES
[facility]  [address] [place] [address]. [address]  T - [phone]  F - [phone]     Progress Note          PATIENT  DEMOGRAPHICS   PATIENT NAME: Yamileth Slater                      PHYSICIAN: Ace Lauren MD  : 1960  MRN: [id number]   LOS: 3 days    Patient Care Team:  Kiersten Wilson APRN as PCP - General (Family Medicine)  Subjective   SUBJECTIVE   Feels well, comfortable, some confusion. bp in 130-150 range         Objective   OBJECTIVE   Vital Signs  Temp:  [97.9 °F (36.6 °C)-98.7 °F (37.1 °C)] 97.9 °F (36.6 °C)  Heart Rate:  [] 98  Resp:  [12-20] 15  BP: (108-191)/(56-98) 134/60  Arterial Line BP: (207-209)/(85-91) 209/91    Flowsheet Rows    Flowsheet Row First Filed Value   Admission Height 157.5 cm (62\") Documented at 12/15/2022 1700   Admission Weight 124 kg (273 lb 4.8 oz) Documented at 12/15/2022 1700           I/O last 3 completed shifts:  In: 855 [P.O.:90; I.V.:765]  Out: 6350 [Urine:3650; Other:2700]    PHYSICAL EXAM    Physical Exam  Constitutional:       Appearance: She is well-developed.   HENT:      Head: Normocephalic.   Eyes:      Pupils: Pupils are equal, round, and reactive to light.   Cardiovascular:      Rate and Rhythm: Normal rate and regular rhythm.      Heart sounds: Normal heart sounds.   Pulmonary:      Effort: Pulmonary effort is normal.      Breath sounds: Normal breath sounds.   Abdominal:      General: Bowel sounds are normal.      Palpations: Abdomen is soft.   Musculoskeletal:      Comments: Left bka    Skin:     Coloration: Skin is not jaundiced.   Neurological:      General: No focal deficit present.      Mental Status: She is alert and oriented to person, place, and time.         RESULTS   Results Review:    Results from last 7 days   Lab Units 22  0549 22  0458 22  1409 22  0259   SODIUM mmol/L 132* 134*  --  135*   POTASSIUM mmol/L 4.6 6.0* 5.0 7.7*   CHLORIDE mmol/L 91* 97*  --  99   CO2 mmol/L  22.0 21.0*  --  22.0   BUN mg/dL 39* 45*  --  72*   CREATININE mg/dL 4.19* 4.68*  --  6.27*   CALCIUM mg/dL 10.1 9.4  --  9.2   BILIRUBIN mg/dL 0.5 0.5  --  0.4   ALK PHOS U/L 306* 307*  --  297*   ALT (SGPT) U/L 6 13  --  16   AST (SGOT) U/L 18 18  --  23   GLUCOSE mg/dL 172* 180*  --  100*       Estimated Creatinine Clearance: 16 mL/min (A) (by C-G formula based on SCr of 4.19 mg/dL (H)).    Results from last 7 days   Lab Units 12/16/22  0259   MAGNESIUM mg/dL 1.9   PHOSPHORUS mg/dL 8.9*             Results from last 7 days   Lab Units 12/18/22  0825 12/18/22  0549 12/17/22  0458 12/16/22  0259   WBC 10*3/mm3 10.68 10.48 9.36 9.27   HEMOGLOBIN g/dL 12.7 12.8 11.0* 10.5*   PLATELETS 10*3/mm3 253 261 219 213               Imaging Results (Last 24 Hours)     ** No results found for the last 24 hours. **           MEDICATIONS    atorvastatin, 20 mg, Oral, Nightly  carvedilol, 3.125 mg, Oral, BID With Meals  cefepime, 1 g, Intravenous, Q24H  DULoxetine, 20 mg, Oral, Daily  [START ON 12/19/2022] epoetin hubert/hubert-epbx, 4,000 Units, Intravenous, Once per day on Mon Wed Fri  furosemide, 80 mg, Oral, Once per day on Sun Tue Thu Sat  gabapentin, 300 mg, Oral, Once per day on Mon Wed Fri   And  gabapentin, 300 mg, Oral, 2 times per day on Sun Tue Thu Sat  heparin (porcine), 5,000 Units, Subcutaneous, Q8H  Insulin Aspart, 0-9 Units, Subcutaneous, TID AC  losartan, 25 mg, Oral, Daily  memantine, 5 mg, Oral, BID  metroNIDAZOLE, 500 mg, Intravenous, Q8H  pantoprazole, 40 mg, Oral, Nightly  sevelamer, 800 mg, Oral, TID With Meals  sodium chloride, 10 mL, Intravenous, Q12H      O2, 2 L/min  Pharmacy to dose vancomycin,   sodium chloride, 1,000 mL        Assessment & Plan   ASSESSMENT / PLAN      Venous insufficiency    Morbid obesity with BMI of 50.0-59.9, adult (HCC)    Coronary artery disease involving native coronary artery of native heart without angina pectoris    Carotid artery disease ([facility])    PAD (peripheral artery  disease) (HCC)    Pulmonary hypertension (HCC)    MIKE and COPD overlap syndrome (HCC)    ESRD on hemodialysis (HCC)    Left foot pain    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  She is currently dialyzing at [facility]. her current access is tunneled catheter.  She has prior right upper extremity failed AV fistula. She has recurrent hyperkalemia and had hd Friday and saturday. k stable. Hd tomorrow. Losartan lowered due to high k    keep diuretic non dialysis days     2- anemia chronic kidney disease patient has history of B12 deficiency and AV malformation.  hemoglobin is more than 10     3.diabetes mellitus type 2 with nonhealing wound on the left foot and now  left BKA     4. CAD     5. ckd mbd on sevelamer in out patient setting     6. htn increase coreg      Copied text in this note has been reviewed and is accurate as of 12/18/2022.                  This document has been electronically signed by Ace Lauren MD on December 18, 2022 10:15 CST

## 2022-12-18 NOTE — PLAN OF CARE
Goal Outcome Evaluation:   Patient remains confused/agitated. VSS. Good urine output this shift. Pain well controlled with PRN medications.

## 2022-12-18 NOTE — PLAN OF CARE
Goal Outcome Evaluation:              Outcome Evaluation: Patient got dilaudid once for pain today. Answered some questions correctly. However, remains confused, and does not seem to understand care when explained. She ate a couple of bites of breakfast, snacks, and half of her lunch. She got a bath and her purewick device was changed.

## 2022-12-18 NOTE — PROGRESS NOTES
ORTHOPEDIC PROGRESS NOTE:    Name:  Yamileth Slater  Date:    2022  Date of admission:  12/15/2022    Post op day:  2 Days Post-Op  Procedure:    Procedure(s) (LRB):  AMPUTATION BELOW KNEE, LEFT (Left)    Subjective:  No orthopedic changes per nursing  No fever or chills      Vitals:     Vitals:    22 0700   BP: 155/79   Pulse: 97   Resp:    Temp:    SpO2: 100%      Temp (24hrs), Av.5 °F (36.9 °C), Min:98.1 °F (36.7 °C), Max:99 °F (37.2 °C)      Exam:  Confused  Restrained  Dressing changed left BKA site.  Incision is clean - no erythema  Mild bloody drainage  Good cap refill    Results from last 7 days   Lab Units 22  0549 22  0458   CRP mg/dL 16.43* 14.49*     Results from last 7 days   Lab Units 22  0549 22  0458 22  0259   WBC 10*3/mm3 10.48 9.36 9.27   HEMOGLOBIN g/dL 12.8 11.0* 10.5*   HEMATOCRIT % 42.2 36.6 33.0*   PLATELETS 10*3/mm3 261 219 213     Results from last 7 days   Lab Units 22  0549 22  0458 22  1409 22  0259   SODIUM mmol/L 132* 134*  --  135*   POTASSIUM mmol/L 4.6 6.0* 5.0 7.7*   CHLORIDE mmol/L 91* 97*  --  99   CO2 mmol/L 22.0 21.0*  --  22.0   BUN mg/dL 39* 45*  --  72*   CREATININE mg/dL 4.19* 4.68*  --  6.27*   CALCIUM mg/dL 10.1 9.4  --  9.2   BILIRUBIN mg/dL 0.5 0.5  --  0.4   ALK PHOS U/L 306* 307*  --  297*   ALT (SGPT) U/L 6 13  --  16   AST (SGOT) U/L 18 18  --  23   GLUCOSE mg/dL 172* 180*  --  100*         ASSESSMENT:  Active Hospital Problems    Diagnosis  POA   • **Venous insufficiency [I87.2]  Yes   • Left foot pain [M79.672]  Yes     Added automatically from request for surgery [id number]     • 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 Maintain strict Sats between 88-92%       • Pulmonary hypertension ([facility]) [I27.20]  Yes   • Coronary artery disease involving native coronary artery of native heart without angina pectoris [I25.10]  Yes     - S/P CABG, Bilateral carotid artery stenosis w/ 2 stents on left side,  PAD (peripheral artery disease) with stent in right upper leg  - Cardiology on board       • Morbid obesity with BMI of 50.0-59.9, adult ([facility]) [E66.01, Z68.43]  Not Applicable     Body mass index is 56.52 kg/m².  - Consistent carb, cardiac, renal diet     • Carotid artery disease ([facility]) [I77.9]  Yes     -Status post stenting  -Continue home medications     • PAD (peripheral artery disease) ([facility]) [I73.9]  Yes     · Continue ASA 81mg, Clopidogrel 75mg, Atorvastatin 40mg           PLAN:    POD 2 s/p BKA left  Dressing changed by me this am.  Work with PT;/OT  Mobility  DVT prophylaxis  IV antibiotics for 5 days associated with amputation - longer if necessary    12/18/22 at 07:56 CST by Huy White MD

## 2022-12-18 NOTE — PROGRESS NOTES
[facility] Medicine Services  INPATIENT PROGRESS NOTE    Length of Stay: 3  Date of Admission: 12/15/2022  Primary Care Physician: Kiersten Wilson APRN    Subjective   Chief Complaint: Confusion  HPI:    She is doing better today but still having some mild confusion.  Appetite has been poor.  Has some intermittent difficulty breathing has been on supplemental oxygen.    Review of Systems   Unable to perform ROS: Mental status change        All pertinent negatives and positives are as above. All other systems have been reviewed and are negative unless otherwise stated.     Objective    Temp:  [97.9 °F (36.6 °C)-98.7 °F (37.1 °C)] 97.9 °F (36.6 °C)  Heart Rate:  [] 95  Resp:  [12-20] 15  BP: (108-191)/(56-98) 151/75  Arterial Line BP: (209)/(91) 209/91  Physical Exam  Constitutional:       General: She is not in acute distress.     Appearance: She is well-developed. She is not diaphoretic.   HENT:      Head: Normocephalic and atraumatic.   Cardiovascular:      Rate and Rhythm: Normal rate.   Pulmonary:      Effort: Pulmonary effort is normal. No respiratory distress.      Breath sounds: No wheezing.   Abdominal:      General: There is no distension.      Palpations: Abdomen is soft.   Musculoskeletal:         General: Normal range of motion.   Skin:     General: Skin is warm and dry.   Neurological:      Mental Status: She is alert.      Cranial Nerves: No cranial nerve deficit.   Psychiatric:      Comments: Confusion               Results Review:  I have reviewed the labs, radiology results, and diagnostic studies.    Laboratory Data:   Lab Results (last 24 hours)     Procedure Component Value Units Date/Time    Extra Tubes [[id number]] Collected: 12/18/22 0831    Specimen: Blood, Venous Line Updated: 12/18/22 0945    Narrative:      The following orders were created for panel order Extra Tubes.  Procedure                               Abnormality         Status                      ---------                               -----------         ------                     Green Top (Gel)[[id number]]                                  Final result                 Please view results for these tests on the individual orders.    Green Top (Gel) [[id number]] Collected: 12/18/22 0831    Specimen: Blood Updated: 12/18/22 0945     Extra Tube Hold for add-ons.     Comment: Auto resulted.       CBC (No Diff) [[id number]]  (Abnormal) Collected: 12/18/22 0825    Specimen: Blood Updated: 12/18/22 0834     WBC 10.68 10*3/mm3      RBC 4.76 10*6/mm3      Hemoglobin 12.7 g/dL      Hematocrit 41.4 %      MCV 87.0 fL      MCH 26.7 pg      MCHC 30.7 g/dL      RDW 16.1 %      RDW-SD 51.2 fl      MPV 8.1 fL      Platelets 253 10*3/mm3     CBC & Differential [[id number]]  (Abnormal) Collected: 12/18/22 0549    Specimen: Blood Updated: 12/18/22 0702    Narrative:      The following orders were created for panel order CBC & Differential.  Procedure                               Abnormality         Status                     ---------                               -----------         ------                     Manual Differential[[id number]]          Abnormal            Final result               CBC Auto Differential[[id number]]        Abnormal            Final result                 Please view results for these tests on the individual orders.    Manual Differential [[id number]]  (Abnormal) Collected: 12/18/22 0549    Specimen: Blood Updated: 12/18/22 0702     Neutrophil % 66.0 %      Lymphocyte % 22.0 %      Monocyte % 9.0 %      Eosinophil % 1.0 %      Basophil % 2.0 %      Neutrophils Absolute 6.92 10*3/mm3      Lymphocytes Absolute 2.31 10*3/mm3      Monocytes Absolute 0.94 10*3/mm3      Eosinophils Absolute 0.10 10*3/mm3      Basophils Absolute 0.21 10*3/mm3      RBC Morphology Normal     WBC Morphology Normal     Platelet Morphology Normal    Comprehensive Metabolic Panel [[id number]]  (Abnormal) Collected:  12/18/22 0549    Specimen: Blood Updated: 12/18/22 0639     Glucose 172 mg/dL      BUN 39 mg/dL      Creatinine 4.19 mg/dL      Sodium 132 mmol/L      Potassium 4.6 mmol/L      Comment: Slight hemolysis detected by analyzer. Results may be affected.        Chloride 91 mmol/L      CO2 22.0 mmol/L      Calcium 10.1 mg/dL      Total Protein 9.3 g/dL      Albumin 3.80 g/dL      ALT (SGPT) 6 U/L      AST (SGOT) 18 U/L      Alkaline Phosphatase 306 U/L      Total Bilirubin 0.5 mg/dL      Globulin 5.5 gm/dL      A/G Ratio 0.7 g/dL      BUN/Creatinine Ratio 9.3     Anion Gap 19.0 mmol/L      eGFR 11.4 mL/min/1.73      Comment: <15 Indicative of kidney failure       Narrative:      GFR Normal >60  Chronic Kidney Disease <60  Kidney Failure <15      Vancomycin, Random [[id number]]  (Normal) Collected: 12/18/22 0549    Specimen: Blood Updated: 12/18/22 0639     Vancomycin Random 16.20 mcg/mL     C-reactive Protein [[id number]]  (Abnormal) Collected: 12/18/22 0549    Specimen: Blood Updated: 12/18/22 0639     C-Reactive Protein 16.43 mg/dL     CBC Auto Differential [[id number]]  (Abnormal) Collected: 12/18/22 0549    Specimen: Blood Updated: 12/18/22 0616     WBC 10.48 10*3/mm3      RBC 4.81 10*6/mm3      Hemoglobin 12.8 g/dL      Hematocrit 42.2 %      MCV 87.7 fL      MCH 26.6 pg      MCHC 30.3 g/dL      RDW 15.9 %      RDW-SD 51.6 fl      MPV 8.4 fL      Platelets 261 10*3/mm3     POC Glucose Once [[id number]]  (Abnormal) Collected: 12/18/22 0532    Specimen: Blood Updated: 12/18/22 0546     Glucose 153 mg/dL      Comment: : [id number] ALBERT AMBERMeter ID: [id number]       Blood Culture - Blood, Arm, Left [[id number]]  (Normal) Collected: 12/15/22 1818    Specimen: Blood from Arm, Left Updated: 12/17/22 1931     Blood Culture No growth at 2 days    Blood Culture - Blood, Arm, Left [[id number]]  (Normal) Collected: 12/15/22 1818    Specimen: Blood from Arm, Left Updated: 12/17/22 1931     Blood Culture No growth at 2  days    TISSUE EXAM, [facility]) [[id number]] Collected: 12/16/22 1646    Specimen: Tissue from Leg, Left Updated: 12/17/22 1659    POC Glucose Once [[id number]]  (Normal) Collected: 12/17/22 1549    Specimen: Blood Updated: 12/17/22 1604     Glucose 122 mg/dL      Comment: RN NotifiedOperator: [id number] LUCILA SYKES ANNMeter ID: [id number]             Culture Data:   Blood Culture   Date Value Ref Range Status   12/15/2022 No growth at 2 days  Preliminary   12/15/2022 No growth at 2 days  Preliminary     Urine Culture   Date Value Ref Range Status   12/16/2022 No growth  Final     No results found for: RESPCX  No results found for: WOUNDCX  No results found for: STOOLCX  No components found for: BODYFLD    Radiology Data:   Imaging Results (Last 24 Hours)     ** No results found for the last 24 hours. **          I have reviewed the patient's current medications.     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  Place the patient on IV antibiotics.    Status post BKA, will continue with IV antibiotics, orthopedic surgery is managing.  Continue with routine wound dressing changes.     End-stage renal disease.  Consult Dr. Gupta for arrangements for dialysis which is usually completed Monday Wednesday and Friday.  Continue with dialysis as per nephrology    Hyperkalemia-potassium has normalized now, we will continue to monitor    Anemia-hemoglobin is improving, will monitor     Insulin-dependent  diabetes mellitus type 2.  We will check A1c.  Check TSH.  Continue to monitor     Hyperlipidemia continue Lipitor.     Diabetic neuropathy continue Cymbalta and gabapentin     Hypertension continue losartan     Chronic pain continue oxycodone     GERD continue Protonix     Morbid obesity BMI is 47.  Continue nutritional support.     DVT prophylaxis Heparin     CODE STATUS full code              Simeon Peña MD   12/18/22   11:23 CST

## 2022-12-18 NOTE — PROGRESS NOTES
Pharmacokinetics by Pharmacy - Vancomycin    Yamileth Slater is a 62 y.o. female receiving vancomycin (day 4) for SSTI.  Patient is also receiving cefepime.    Objective:    Temp Readings from Last 1 Encounters:   12/18/22 98.1 °F (36.7 °C) (Axillary)     WBC   Date Value Ref Range Status   12/18/2022 10.68 3.40 - 10.80 10*3/mm3 Final   12/18/2022 10.48 3.40 - 10.80 10*3/mm3 Final   12/17/2022 9.36 3.40 - 10.80 10*3/mm3 Final      C-Reactive Protein   Date Value Ref Range Status   12/18/2022 16.43 (H) 0.00 - 0.50 mg/dL Final   12/17/2022 14.49 (H) 0.00 - 0.50 mg/dL Final      Creatinine   Date Value Ref Range Status   12/18/2022 4.19 (H) 0.57 - 1.00 mg/dL Final   12/17/2022 4.68 (H) 0.57 - 1.00 mg/dL Final   12/16/2022 6.27 (H) 0.57 - 1.00 mg/dL Final       Vancomycin Random   Date Value Ref Range Status   12/18/2022 16.20 5.00 - 40.00 mcg/mL Final   12/17/2022 20.00 5.00 - 40.00 mcg/mL Final       Culture Results:  Microbiology Results (last 10 days)     Procedure Component Value - Date/Time    Urine Culture - Urine, Straight Cath [[id number]]  (Normal) Collected: 12/16/22 1240    Lab Status: Final result Specimen: Urine from Straight Cath Updated: 12/17/22 0841     Urine Culture No growth    Blood Culture - Blood, Arm, Left [[id number]]  (Normal) Collected: 12/15/22 1818    Lab Status: Preliminary result Specimen: Blood from Arm, Left Updated: 12/17/22 1931     Blood Culture No growth at 2 days    Blood Culture - Blood, Arm, Left [[id number]]  (Normal) Collected: 12/15/22 1818    Lab Status: Preliminary result Specimen: Blood from Arm, Left Updated: 12/17/22 1931     Blood Culture No growth at 2 days        No results found for: RESPCX    [Ht: 157.5 cm (62.01\"); Wt: 109 kg (241 lb)]   Body mass index is 44.07 kg/m².  Ideal body weight: 50.1 kg (110 lb 7.9 oz)  Adjusted ideal body weight: 73.8 kg (162 lb 11.1 oz)      Assessment:  • S/p BKA on 12/16. Plan for 5 days of postop IV antibiotics.   • WBCs WNL,  afebrile  • HD MWF. Last dialysis session Saturday for 2.5 hours. Next dialysis planned Monday.   • Cultures  o 12/15 blood culture NGD2  o 12/16 urine culture no growth   • Vancomycin random resulted at 16.2 mcg/mL this morning @ 0549.   • Goal trough is 10-20 mcg/mL. Due to dialysis, patient does not meet AUC dosing.       Plan:  1. Pulse dosing. Give vancomycin 500 mg IV x1 today @ 1800.   2. Vancomycin random to be collected tomorrow morning. Consulted until 12/22.  3. Pharmacy will monitor renal function and adjust dose accordingly.    Thank you for this consult.     Robin Meza [facility]   12/18/22 08:45 CST

## 2022-12-19 LAB
ALBUMIN SERPL-MCNC: 3.4 G/DL (ref 3.5–5.2)
ALBUMIN/GLOB SERPL: 0.7 G/DL
ALP SERPL-CCNC: 252 U/L (ref 39–117)
ALT SERPL W P-5'-P-CCNC: <5 U/L (ref 1–33)
ANION GAP SERPL CALCULATED.3IONS-SCNC: 16 MMOL/L (ref 5–15)
ANISOCYTOSIS BLD QL: ABNORMAL
AST SERPL-CCNC: 16 U/L (ref 1–32)
BASOPHILS # BLD MANUAL: 0.2 10*3/MM3 (ref 0–0.2)
BASOPHILS NFR BLD MANUAL: 2 % (ref 0–1.5)
BILIRUB SERPL-MCNC: 0.4 MG/DL (ref 0–1.2)
BUN SERPL-MCNC: 57 MG/DL (ref 8–23)
BUN/CREAT SERPL: 11.4 (ref 7–25)
CALCIUM SPEC-SCNC: 9.9 MG/DL (ref 8.6–10.5)
CHLORIDE SERPL-SCNC: 95 MMOL/L (ref 98–107)
CO2 SERPL-SCNC: 22 MMOL/L (ref 22–29)
CREAT SERPL-MCNC: 4.98 MG/DL (ref 0.57–1)
CRP SERPL-MCNC: 12.95 MG/DL (ref 0–0.5)
DEPRECATED RDW RBC AUTO: 49.2 FL (ref 37–54)
EGFRCR SERPLBLD CKD-EPI 2021: 9.2 ML/MIN/1.73
EOSINOPHIL # BLD MANUAL: 0.1 10*3/MM3 (ref 0–0.4)
EOSINOPHIL NFR BLD MANUAL: 1 % (ref 0.3–6.2)
ERYTHROCYTE [DISTWIDTH] IN BLOOD BY AUTOMATED COUNT: 16 % (ref 12.3–15.4)
GLOBULIN UR ELPH-MCNC: 5.1 GM/DL
GLUCOSE BLDC GLUCOMTR-MCNC: 155 MG/DL (ref 70–130)
GLUCOSE BLDC GLUCOMTR-MCNC: 203 MG/DL (ref 70–130)
GLUCOSE BLDC GLUCOMTR-MCNC: 217 MG/DL (ref 70–130)
GLUCOSE SERPL-MCNC: 201 MG/DL (ref 65–99)
HCT VFR BLD AUTO: 38.7 % (ref 34–46.6)
HGB BLD-MCNC: 12.3 G/DL (ref 12–15.9)
LYMPHOCYTES # BLD MANUAL: 1.95 10*3/MM3 (ref 0.7–3.1)
LYMPHOCYTES NFR BLD MANUAL: 3 % (ref 5–12)
MCH RBC QN AUTO: 27 PG (ref 26.6–33)
MCHC RBC AUTO-ENTMCNC: 31.8 G/DL (ref 31.5–35.7)
MCV RBC AUTO: 85.1 FL (ref 79–97)
MONOCYTES # BLD: 0.29 10*3/MM3 (ref 0.1–0.9)
NEUTROPHILS # BLD AUTO: 7.23 10*3/MM3 (ref 1.7–7)
NEUTROPHILS NFR BLD MANUAL: 74 % (ref 42.7–76)
PLATELET # BLD AUTO: 259 10*3/MM3 (ref 140–450)
PMV BLD AUTO: 8.3 FL (ref 6–12)
POTASSIUM SERPL-SCNC: 4.3 MMOL/L (ref 3.5–5.2)
PROT SERPL-MCNC: 8.5 G/DL (ref 6–8.5)
RBC # BLD AUTO: 4.55 10*6/MM3 (ref 3.77–5.28)
SMALL PLATELETS BLD QL SMEAR: ADEQUATE
SODIUM SERPL-SCNC: 133 MMOL/L (ref 136–145)
VANCOMYCIN SERPL-MCNC: 19.7 MCG/ML (ref 5–40)
VARIANT LYMPHS NFR BLD MANUAL: 20 % (ref 19.6–45.3)
WBC MORPH BLD: NORMAL
WBC NRBC COR # BLD: 9.77 10*3/MM3 (ref 3.4–10.8)

## 2022-12-19 PROCEDURE — 85027 COMPLETE CBC AUTOMATED: CPT | Performed by: ORTHOPAEDIC SURGERY

## 2022-12-19 PROCEDURE — 80202 ASSAY OF VANCOMYCIN: CPT | Performed by: INTERNAL MEDICINE

## 2022-12-19 PROCEDURE — 80053 COMPREHEN METABOLIC PANEL: CPT | Performed by: ORTHOPAEDIC SURGERY

## 2022-12-19 PROCEDURE — 25010000002 VANCOMYCIN 10 G RECONSTITUTED SOLUTION: Performed by: ORTHOPAEDIC SURGERY

## 2022-12-19 PROCEDURE — 85007 BL SMEAR W/DIFF WBC COUNT: CPT | Performed by: ORTHOPAEDIC SURGERY

## 2022-12-19 PROCEDURE — 25010000002 CEFEPIME PER 500 MG: Performed by: ORTHOPAEDIC SURGERY

## 2022-12-19 PROCEDURE — 63710000001 INSULIN ASPART PER 5 UNITS: Performed by: ORTHOPAEDIC SURGERY

## 2022-12-19 PROCEDURE — 97162 PT EVAL MOD COMPLEX 30 MIN: CPT

## 2022-12-19 PROCEDURE — 25010000002 HYDROMORPHONE 1 MG/ML SOLUTION: Performed by: ORTHOPAEDIC SURGERY

## 2022-12-19 PROCEDURE — 82962 GLUCOSE BLOOD TEST: CPT

## 2022-12-19 PROCEDURE — 25010000002 HEPARIN (PORCINE) PER 1000 UNITS: Performed by: ORTHOPAEDIC SURGERY

## 2022-12-19 PROCEDURE — 86140 C-REACTIVE PROTEIN: CPT | Performed by: ORTHOPAEDIC SURGERY

## 2022-12-19 RX ORDER — HEPARIN SODIUM 1000 [USP'U]/ML
4000 INJECTION, SOLUTION INTRAVENOUS; SUBCUTANEOUS AS NEEDED
Status: DISCONTINUED | OUTPATIENT
Start: 2022-12-19 | End: 2022-12-19 | Stop reason: SDUPTHER

## 2022-12-19 RX ORDER — DOCUSATE SODIUM 100 MG/1
100 CAPSULE, LIQUID FILLED ORAL 2 TIMES DAILY
Status: DISCONTINUED | OUTPATIENT
Start: 2022-12-19 | End: 2022-12-21

## 2022-12-19 RX ORDER — ALBUMIN (HUMAN) 12.5 G/50ML
12.5 SOLUTION INTRAVENOUS AS NEEDED
Status: ACTIVE | OUTPATIENT
Start: 2022-12-19 | End: 2022-12-19

## 2022-12-19 RX ADMIN — HEPARIN SODIUM 5000 UNITS: 5000 INJECTION INTRAVENOUS; SUBCUTANEOUS at 21:00

## 2022-12-19 RX ADMIN — CEFEPIME HYDROCHLORIDE 1 G: 1 INJECTION, POWDER, FOR SOLUTION INTRAMUSCULAR; INTRAVENOUS at 18:19

## 2022-12-19 RX ADMIN — INSULIN ASPART 4 UNITS: 100 INJECTION, SOLUTION INTRAVENOUS; SUBCUTANEOUS at 18:34

## 2022-12-19 RX ADMIN — HEPARIN SODIUM 5000 UNITS: 5000 INJECTION INTRAVENOUS; SUBCUTANEOUS at 06:05

## 2022-12-19 RX ADMIN — MEMANTINE 5 MG: 5 TABLET ORAL at 20:46

## 2022-12-19 RX ADMIN — SEVELAMER CARBONATE 800 MG: 800 TABLET, FILM COATED ORAL at 18:20

## 2022-12-19 RX ADMIN — INSULIN ASPART 2 UNITS: 100 INJECTION, SOLUTION INTRAVENOUS; SUBCUTANEOUS at 10:49

## 2022-12-19 RX ADMIN — VANCOMYCIN HYDROCHLORIDE 1000 MG: 10 INJECTION, POWDER, LYOPHILIZED, FOR SOLUTION INTRAVENOUS at 18:19

## 2022-12-19 RX ADMIN — ATORVASTATIN CALCIUM 20 MG: 20 TABLET, FILM COATED ORAL at 20:46

## 2022-12-19 RX ADMIN — HYDROMORPHONE HYDROCHLORIDE 0.5 MG: 1 INJECTION, SOLUTION INTRAMUSCULAR; INTRAVENOUS; SUBCUTANEOUS at 14:28

## 2022-12-19 RX ADMIN — METRONIDAZOLE 500 MG: 500 INJECTION, SOLUTION INTRAVENOUS at 03:10

## 2022-12-19 RX ADMIN — Medication 10 ML: at 20:47

## 2022-12-19 RX ADMIN — METRONIDAZOLE 500 MG: 500 INJECTION, SOLUTION INTRAVENOUS at 13:17

## 2022-12-19 RX ADMIN — PANTOPRAZOLE SODIUM 40 MG: 40 TABLET, DELAYED RELEASE ORAL at 20:46

## 2022-12-19 RX ADMIN — METRONIDAZOLE 500 MG: 500 INJECTION, SOLUTION INTRAVENOUS at 20:47

## 2022-12-19 RX ADMIN — HEPARIN SODIUM 5000 UNITS: 5000 INJECTION INTRAVENOUS; SUBCUTANEOUS at 13:29

## 2022-12-19 RX ADMIN — DOCUSATE SODIUM 100 MG: 100 CAPSULE, LIQUID FILLED ORAL at 20:46

## 2022-12-19 NOTE — PROGRESS NOTES
[facility]  [address] [place] [address]. [address]  T - [phone]  F - [phone]     Progress Note          PATIENT  DEMOGRAPHICS   PATIENT NAME: Yamileth Slater                      PHYSICIAN: Ace Lauren MD  : 1960  MRN: [id number]   LOS: 4 days    Patient Care Team:  Kiersten Wilson APRN as PCP - General (Family Medicine)  Subjective   SUBJECTIVE   C/o pain at surgical site. Venous chamber clotted.        Objective   OBJECTIVE   Vital Signs  Temp:  [97.8 °F (36.6 °C)-99 °F (37.2 °C)] 97.9 °F (36.6 °C)  Heart Rate:  [79-99] 99  Resp:  [12-] 16  BP: (100-174)/(51-87) 110/72    Flowsheet Rows    Flowsheet Row First Filed Value   Admission Height 157.5 cm (62\") Documented at 12/15/2022 1700   Admission Weight 124 kg (273 lb 4.8 oz) Documented at 12/15/2022 1700           I/O last 3 completed shifts:  In: 1505 [P.O.:540; I.V.:765; IV Piggyback:200]  Out: 1450 [Urine:1450]    PHYSICAL EXAM    Physical Exam  Constitutional:       Appearance: She is well-developed.   HENT:      Head: Normocephalic.   Eyes:      Pupils: Pupils are equal, round, and reactive to light.   Cardiovascular:      Rate and Rhythm: Normal rate and regular rhythm.      Heart sounds: Normal heart sounds.   Pulmonary:      Effort: Pulmonary effort is normal.      Breath sounds: Normal breath sounds.   Abdominal:      General: Bowel sounds are normal.      Palpations: Abdomen is soft.   Musculoskeletal:      Comments: Left bka    Skin:     Coloration: Skin is not jaundiced.   Neurological:      General: No focal deficit present.      Mental Status: She is alert and oriented to person, place, and time.         RESULTS   Results Review:    Results from last 7 days   Lab Units 22  0600 22  0549 22  0458   SODIUM mmol/L 133* 132* 134*   POTASSIUM mmol/L 4.3 4.6 6.0*   CHLORIDE mmol/L 95* 91* 97*   CO2 mmol/L 22.0 22.0 21.0*   BUN mg/dL 57* 39* 45*   CREATININE mg/dL 4.98* 4.19* 4.68*    CALCIUM mg/dL 9.9 10.1 9.4   BILIRUBIN mg/dL 0.4 0.5 0.5   ALK PHOS U/L 252* 306* 307*   ALT (SGPT) U/L <5 6 13   AST (SGOT) U/L 16 18 18   GLUCOSE mg/dL 201* 172* 180*       Estimated Creatinine Clearance: 13.9 mL/min (A) (by C-G formula based on SCr of 4.98 mg/dL (H)).    Results from last 7 days   Lab Units 12/16/22  0259   MAGNESIUM mg/dL 1.9   PHOSPHORUS mg/dL 8.9*             Results from last 7 days   Lab Units 12/19/22  0600 12/18/22  0825 12/18/22  0549 12/17/22  0458 12/16/22  0259   WBC 10*3/mm3 9.77 10.68 10.48 9.36 9.27   HEMOGLOBIN g/dL 12.3 12.7 12.8 11.0* 10.5*   PLATELETS 10*3/mm3 259 253 261 219 213               Imaging Results (Last 24 Hours)     ** No results found for the last 24 hours. **           MEDICATIONS    !Vancomycin Level Draw Needed, , Does not apply, Once  atorvastatin, 20 mg, Oral, Nightly  carvedilol, 6.25 mg, Oral, BID With Meals  cefepime, 1 g, Intravenous, Q24H  DULoxetine, 20 mg, Oral, Daily  epoetin hubert/hubert-epbx, 4,000 Units, Intravenous, Once per day on Mon Wed Fri  furosemide, 80 mg, Oral, Once per day on Sun Tue Thu Sat  gabapentin, 300 mg, Oral, Once per day on Mon Wed Fri   And  gabapentin, 300 mg, Oral, 2 times per day on Sun Tue Thu Sat  heparin (porcine), 5,000 Units, Subcutaneous, Q8H  Insulin Aspart, 0-9 Units, Subcutaneous, TID AC  losartan, 25 mg, Oral, Daily  memantine, 5 mg, Oral, BID  metroNIDAZOLE, 500 mg, Intravenous, Q8H  pantoprazole, 40 mg, Oral, Nightly  sevelamer, 800 mg, Oral, TID With Meals  sodium chloride, 10 mL, Intravenous, Q12H      O2, 2 L/min  Pharmacy to dose vancomycin,   sodium chloride, 1,000 mL        Assessment & Plan   ASSESSMENT / PLAN      Venous insufficiency    Morbid obesity with BMI of 50.0-59.9, adult (HCC)    Coronary artery disease involving native coronary artery of native heart without angina pectoris    Carotid artery disease (HCC)    PAD (peripheral artery disease) (HCC)    Pulmonary hypertension (HCC)    MIKE and COPD  overlap syndrome (HCC)    ESRD on hemodialysis (HCC)    Left foot pain    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  She is currently dialyzing at [facility]. her current access is tunneled catheter.  She has prior right upper extremity failed AV fistula. She has recurrent hyperkalemia and had hd Friday and saturday. k stable today and 2 k bath. Losartan lowered due to high k    keep diuretic non dialysis days (makes reasonable urine and for kaliuresis)     2- anemia chronic kidney disease patient has history of B12 deficiency and AV malformation.  hemoglobin is more than 10. Will stop epogen     3.diabetes mellitus type 2 with nonhealing wound on the left foot and now  left BKA     4. CAD     5. ckd mbd on sevelamer in out patient setting     6. htn stable on coreg      Copied text in this note has been reviewed and is accurate as of 12/19/2022.                  This document has been electronically signed by Ace Lauren MD on December 19, 2022 10:16 CST

## 2022-12-19 NOTE — PROGRESS NOTES
Pharmacokinetics by Pharmacy - Vancomycin    Yamileth Slater is a 62 y.o. female  [Ht: 157.5 cm (62.01\"); Wt: 115 kg (253 lb 8.5 oz)] is being treated for skin and soft tissue infection, day 5 of therapy.    Estimated Creatinine Clearance: 13.9 mL/min (A) (by C-G formula based on SCr of 4.98 mg/dL (H)).   Creatinine   Date Value Ref Range Status   12/19/2022 4.98 (H) 0.57 - 1.00 mg/dL Final   12/18/2022 4.19 (H) 0.57 - 1.00 mg/dL Final   12/17/2022 4.68 (H) 0.57 - 1.00 mg/dL Final   08/25/2021 2.84 (H) 0.57 - 1.11 mg/dL Final   08/24/2021 3.14 (H) 0.57 - 1.11 mg/dL Final   08/23/2021 3.80 (H) 0.57 - 1.11 mg/dL Final      Lab Results   Component Value Date    WBC 9.77 12/19/2022    WBC 10.68 12/18/2022    WBC 10.48 12/18/2022     C-Reactive Protein   Date Value Ref Range Status   12/19/2022 12.95 (H) 0.00 - 0.50 mg/dL Final   12/18/2022 16.43 (H) 0.00 - 0.50 mg/dL Final   12/17/2022 14.49 (H) 0.00 - 0.50 mg/dL Final     Lactate   Date Value Ref Range Status   09/09/2022 1.5 0.5 - 2.0 mmol/L Final   09/06/2022 1.6 0.5 - 2.0 mmol/L Final   06/30/2022 1.1 0.5 - 2.0 mmol/L Final       Temp Readings from Last 1 Encounters:   12/19/22 97 °F (36.1 °C) (Axillary)      Lab Results   Component Value Date    VANCOTROUGH 20.30 (H) 08/07/2021    VANCORANDOM 19.70 12/19/2022         Culture Results:  Microbiology Results (last 10 days)       Procedure Component Value - Date/Time    Urine Culture - Urine, Straight Cath [[id number]]  (Normal) Collected: 12/16/22 1240    Lab Status: Final result Specimen: Urine from Straight Cath Updated: 12/17/22 0841     Urine Culture No growth    Blood Culture - Blood, Arm, Left [[id number]]  (Normal) Collected: 12/15/22 1818    Lab Status: Preliminary result Specimen: Blood from Arm, Left Updated: 12/18/22 1930     Blood Culture No growth at 3 days    Blood Culture - Blood, Arm, Left [[id number]]  (Normal) Collected: 12/15/22 1818    Lab Status: Preliminary result Specimen: Blood from Arm,  Left Updated: 12/18/22 1930     Blood Culture No growth at 3 days          No results found for: RESPCX    Indication for use: skin and soft tissue infection    Current Vancomycin Dose:  pulse dosing due to dialysis MWF, day 5 of therapy. Post op day 3. Provider wants Vancomycin continued for 5 days post-op    Pt is also on Cefepime    Assessment/Plan:  Reviewed above labs and cultures.   Random Vancomycin level this am is 19.7. After dialysis level will be approximately 13.79.   WBC is 9.77. Cr is 4.98. Will give 1 gm Vancomycin today after dialysis. Will order a random level for Wednesday am. Pharmacy will continue to monitor renal function and adjust dose accordingly.    Светлана Lal, PharmD   12/19/22 14:04 CST

## 2022-12-19 NOTE — PROGRESS NOTES
[facility] Medicine Services  INPATIENT PROGRESS NOTE    Length of Stay: 4  Date of Admission: 12/15/2022  Primary Care Physician: Kiersten Wilson APRN    Subjective   Chief Complaint: Left foot pain secondary to necrosis  HPI: Patient remains confused.  Will make eye contact with me, but will not speak.    As of today, 12/19/22  Review of Systems   Unable to perform ROS: Mental status change        All pertinent negatives and positives are as above. All other systems have been reviewed and are negative unless otherwise stated.    Objective    Temp:  [97 °F (36.1 °C)-99 °F (37.2 °C)] 98.5 °F (36.9 °C)  Heart Rate:  [] 97  Resp:  [12-22] 15  BP: ()/(51-87) 124/84    AM-PAC 6 Clicks Score (PT): 6 (12/19/22 0800)    As of today, 12/19/22  Physical Exam  Vitals reviewed.   Constitutional:       Appearance: She is well-developed.   HENT:      Head: Normocephalic and atraumatic.   Eyes:      Pupils: Pupils are equal, round, and reactive to light.   Cardiovascular:      Rate and Rhythm: Normal rate and regular rhythm.      Heart sounds: Normal heart sounds. No murmur heard.    No friction rub. No gallop.   Pulmonary:      Effort: Pulmonary effort is normal. No respiratory distress.      Breath sounds: Normal breath sounds. No wheezing or rales.   Chest:      Chest wall: No tenderness.   Abdominal:      General: Bowel sounds are normal. There is no distension.      Palpations: Abdomen is soft.      Tenderness: There is no abdominal tenderness. There is no guarding.   Musculoskeletal:      Cervical back: Normal range of motion and neck supple.      Comments: Left BKA.  Dressing clean dry and intact.   Skin:     General: Skin is warm and dry.   Neurological:      Mental Status: She is alert.      Comments: No meaningful interaction   Psychiatric:      Comments: Makes eye contact but does not speak           Results Review:  I have reviewed the labs,  radiology results, and diagnostic studies.    Laboratory Data:   Results from last 7 days   Lab Units 12/19/22  0600 12/18/22  0549 12/17/22  0458   SODIUM mmol/L 133* 132* 134*   POTASSIUM mmol/L 4.3 4.6 6.0*   CHLORIDE mmol/L 95* 91* 97*   CO2 mmol/L 22.0 22.0 21.0*   BUN mg/dL 57* 39* 45*   CREATININE mg/dL 4.98* 4.19* 4.68*   GLUCOSE mg/dL 201* 172* 180*   CALCIUM mg/dL 9.9 10.1 9.4   BILIRUBIN mg/dL 0.4 0.5 0.5   ALK PHOS U/L 252* 306* 307*   ALT (SGPT) U/L <5 6 13   AST (SGOT) U/L 16 18 18   ANION GAP mmol/L 16.0* 19.0* 16.0*     Estimated Creatinine Clearance: 13.9 mL/min (A) (by C-G formula based on SCr of 4.98 mg/dL (H)).  Results from last 7 days   Lab Units 12/16/22  0259   MAGNESIUM mg/dL 1.9   PHOSPHORUS mg/dL 8.9*         Results from last 7 days   Lab Units 12/19/22  0600 12/18/22  0825 12/18/22  0549 12/17/22  0458 12/16/22  0259   WBC 10*3/mm3 9.77 10.68 10.48 9.36 9.27   HEMOGLOBIN g/dL 12.3 12.7 12.8 11.0* 10.5*   HEMATOCRIT % 38.7 41.4 42.2 36.6 33.0*   PLATELETS 10*3/mm3 259 253 261 219 213           Culture Data:   No results found for: BLOODCX  No results found for: URINECX  No results found for: RESPCX  No results found for: WOUNDCX  No results found for: STOOLCX  No components found for: BODYFLD    Radiology Data:   Imaging Results (Last 24 Hours)     ** No results found for the last 24 hours. **          I have reviewed the patient's current medications.     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  Left foot necrosis: Status post BKA.  Continue antibiotics.  2.  End-stage renal disease: Continue hemodialysis.  Appreciate nephrology help.  3.  Diabetes mellitus: Continue current.  4.  Hypertension: Continue losartan.  5.  Altered was: Likely anesthesia reaction.  Patient sister states that this is common for her.  6.  Chronic pain: Continue current pain management for now.  7.  DVT prophylaxis: Heparin    To floor today.    The patient was evaluated during the global COVID-19 pandemic, and the diagnosis was suspected/considered upon their initial presentation.  Evaluation, treatment, and testing were consistent with current guidelines for patients who present with complaints or symptoms that may be related to COVID-19.    I confirmed that the patient's Advance Care Plan is present, code status is documented, or surrogate decision maker is listed in the patient's medical record.       Discharge Planning: I expect patient to be discharged to skilled facility in 4-5 days.        This document has been electronically signed by Jef Ramires MD on December 19, 2022 16:55 CST

## 2022-12-19 NOTE — PLAN OF CARE
Goal Outcome Evaluation:      Pt awake. Pt maintains inability to answer questions. Pt did say a few words at time in response to pain. Pt refused to take p.o. pain medicine. Afebrile. Pt had dialysis today. Transfer orders received. Pt on 2L NC. External cath in place. Left BKA dressing changed per MD order. Site clean, dry, and intact.

## 2022-12-19 NOTE — PROGRESS NOTES
Adult Nutrition  Assessment/PES    Patient Name:  Yamileth Slater  YOB: 1960  MRN: [id number]  Admit Date:  12/15/2022    Assessment Date:  12/19/2022    Comments:  Pt s/p BKA for Left foot Necrosis--wound care ongoing.  She is currently receiving dialysis for ESRD.  Pt has been confused post op surgery with minimal oral intake.  Nsg staff has been feeding her.  Rd will add NovaRenal to supplements diet as she need protein for wound healing.  Rd will continue to monitor oral intake and POC.      Reason for Assessment     Row Name 12/19/22 1427          Reason for Assessment    Reason For Assessment follow-up protocol                Nutrition/Diet History     Row Name 12/19/22 1427          Nutrition/Diet History    Typical Intake (Food/Fluid/EN/PN) Pt receiving dialysis.  confused.  No meaningful answers to my questions                Labs/Tests/Procedures/Meds     Row Name 12/19/22 1428          Labs/Procedures/Meds    Lab Results Reviewed reviewed, pertinent     Lab Results Comments Na 133; Gluc 187/185/201/203; Bun 57; Creat 4.98; crp 12.95        Diagnostic Tests/Procedures    Diagnostic Test/Procedure Reviewed reviewed, pertinent     Diagnostic Test/Procedures Comments s/p BKA; Dialysis; Wound care        Medications    Pertinent Medications Reviewed reviewed, pertinent     Pertinent Medications Comments Vanc; Coreg; Cefepime; Epogen; Neurotin; Heparin; Flagyl; Renvela;                    Nutrition Prescription Ordered     Row Name 12/19/22 1431          Nutrition Prescription PO    Current PO Diet Regular     Fluid Consistency Thin     Common Modifiers Consistent Carbohydrate;Low Sodium;Renal                Evaluation of Received Nutrient/Fluid Intake     Row Name 12/19/22 1431          PO Evaluation    Number of Days PO Intake Evaluated Insufficient Data     % PO Intake Minimal po intake per staff                   Problem/Interventions:   Problem 1     Row Name 12/19/22 4298           Nutrition Diagnoses Problem 1    Problem 1 Increased Nutrient Needs     Macronutrient Protein     Etiology (related to) Medical Diagnosis     Ortho BKA     Renal Hemodialysis;ESRD     Skin Surgical wound     Other Comment Hypermetabolic state                Problem 2     Row Name 12/19/22 1433          Nutrition Diagnoses Problem 2    Problem 2 Altered Nutrition Related to Labs     Etiology (related to) Medical Diagnosis     Metabolic/ICU Hperkalemia;Hyperphosphatemia     Renal ESRD;Hemodialysis     Signs/Symptoms (evidenced by) Biochemical     Specific Labs Noted BUN;Creatinine;K+;Phosphorus                Problem 3     Row Name 12/19/22 1436          Nutrition Diagnoses Problem 3    Problem 3 Inadequate Intake/Infusion     Inadequate Intake Type Oral     Macronutrient Kcal;Protein     Micronutrient Vitamin;Mineral     Etiology (related to) Factors Affecting Nutrition     Mental State/Condition Confusion     Signs/Symptoms (evidenced by) Report of Minimal PO Intake                  Intervention Goal     Row Name 12/19/22 1436          Intervention Goal    General Meet nutritional needs for age/condition;Reduce/improve symptoms     PO Tolerate PO;Increase intake;Meet estimated needs     Weight No significant weight loss                Nutrition Intervention     Row Name 12/19/22 1437          Nutrition Intervention    RD/Tech Action Follow Tx progress;Care plan reviewd;Recommend/ordered     Recommended/Ordered Supplement                Nutrition Prescription     Row Name 12/19/22 1437          Nutrition Prescription PO    PO Prescription Begin/change supplement     Supplement Nova Renal     Supplement Frequency 3 times a day     New PO Prescription Ordered? Yes                Education/Evaluation     Row Name 12/19/22 1437          Education    Education Will Instruct as appropriate        Monitor/Evaluation    Monitor Per protocol;I&O;PO intake;Supplement intake;Pertinent labs;Weight;Skin status;Symptoms      Education Follow-up Reinforce PRN                 Electronically signed by:  Gissel Littlejohn RD  12/19/22 14:38 CST

## 2022-12-19 NOTE — SIGNIFICANT NOTE
12/19/22 0944   OTHER   Discipline physical therapist;occupational therapist   Rehab Time/Intention   Session Not Performed other (see comments)  (OT/PT eval attempted. Pt in dialysis. OT/PT will f/u as able.)   Recommendation   PT - Next Appointment 12/20/22   Recommendation   OT - Next Appointment 12/20/22

## 2022-12-19 NOTE — PLAN OF CARE
Problem: Adult Inpatient Plan of Care  Goal: Plan of Care Review  Recent Flowsheet Documentation  Taken 12/19/2022 1530 by Rebecca Hernandez PT  Plan of Care Reviewed With: patient  Outcome Evaluation:   PT evaluation completed. Pt groggy and moderately difficult to rouse at times. Sister provided PLOF information as pt very inconsistent in answering questions.  AROM WFL BUE and PROM and AAROM WFL RLE   PROM WFL L hip   pt could not tolerate extending L knee and kept knee in 20 degrees of flexion. Pt required max assistance to roll to right. PT deferred EOB/OOB as pt groggy and could not be trusted with both wrist restraints untied at the same time. PT did place bed in chair position. Noted pt able to self correct trunk to midline and able to provide some pressure relief to bottom. Pt sat 15 minutes with VSS. Pt demonstrates decreased strength, range, and tolerance for functional mobility and activities. Pt will benefit from PT to  gradually improve bed mobility and transfer ability progressing to w/c mobility as tolerated. Anticipate patient will need additional rehab at discharge prior to return home: IRF vs SNF rehab to home vs LTACH pending progress with inpatient therapy.   Goal Outcome Evaluation:  Plan of Care Reviewed With: patient           Outcome Evaluation: PT evaluation completed. Pt groggy and moderately difficult to rouse at times. Sister provided PLOF information as pt very inconsistent in answering questions.  AROM WFL BUE and PROM and AAROM WFL RLE; PROM WFL L hip;pt could not tolerate extending L knee and kept knee in 20 degrees of flexion. Pt required max assistance to roll to right. PT deferred EOB/OOB as pt groggy and could not be trusted with both wrist restraints untied at the same time. PT did place bed in chair position. Noted pt able to self correct trunk to midline and able to provide some pressure relief to bottom. Pt sat 15 minutes with VSS. Pt demonstrates decreased strength, range,  and tolerance for functional mobility and activities. Pt will benefit from PT to  gradually improve bed mobility and transfer ability progressing to w/c mobility as tolerated. Anticipate patient will need additional rehab at discharge prior to return home: IRF vs SNF rehab to home vs LTACH pending progress with inpatient therapy.

## 2022-12-19 NOTE — PLAN OF CARE
Goal Outcome Evaluation:  Plan of Care Reviewed With: caregiver, patient        Progress: improving  Outcome Evaluation: Nutrition F/U:  Pt is s/p BKA.  Currenlty receiving dialysis.  Minimal po intake with confusion.  Will add supplements and monitor po

## 2022-12-19 NOTE — THERAPY EVALUATION
Patient Name: Yamileth Slater  : 1960    MRN: [id number]                              Today's Date: 2022       Admit Date: 12/15/2022    Visit Dx:     ICD-10-CM ICD-9-CM   1. Venous insufficiency  I87.2 459.81   2. Pulmonary hypertension ([facility])  I27.20 416.8   3. Left foot pain  M79.672 729.5   4. PAD (peripheral artery disease) ([facility])  I73.9 443.9   5. On home oxygen therapy  Z99.81 V46.2   6. CKD (chronic kidney disease) requiring chronic dialysis ([facility])  N18.6 585.6    Z99.2 V45.11   7. Bilateral carotid artery stenosis  I65.23 433.10     433.30   8. Morbid obesity with BMI of 45.0-49.9, adult ([facility])  E66.01 278.01    Z68.42 V85.42   9. 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 ([facility])  L97.524 707.15     730.17   10. Coronary artery disease involving native coronary artery of native heart without angina pectoris  I25.10 414.01   11. MIKE and COPD overlap syndrome ([facility])  G47.33 327.23    J44.9 496   12. Impaired physical mobility  Z74.09 781.99     Patient Active Problem List   Diagnosis   • Chronic midline low back pain without sciatica   • Vitamin D deficiency   • Tobacco dependence syndrome   • Shoulder joint pain   • Morbid obesity with BMI of 50.0-59.9, adult ([facility])   • Mixed hyperlipidemia   • Kidney stone   • History of colon polyps   • GERD (gastroesophageal reflux disease)   • Excoriated eczema   • Hypertension   • COPD (chronic obstructive pulmonary disease) ([facility])   • Chronic folliculitis   • Coronary artery disease involving native coronary artery of native heart without angina pectoris   • 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   • Hypothyroidism   • Carotid artery disease ([facility])   • [place] abuse   • PAD (peripheral artery disease) ([facility])   • Venous insufficiency   • LAFB (left anterior fascicular block)   • Pulmonary hypertension ([facility])   • Left hip pain   • Neck pain   • MIKE and COPD overlap syndrome ([facility])   • Pneumonia due to COVID-19 virus   • Hyperkalemia   •  Cutaneous candidiasis   • Community acquired pneumonia of right middle lobe of lung   • MRSA infection   • Esophageal dysphagia   • Monilial esophagitis ([facility])   • NSTEMI, initial episode of care ([facility])   • Cellulitis of foot associated with diabetes mellitus ([facility])   • Urinary tract infection due to Proteus   • History of insertion of tunneled central venous catheter (CVC) with port   • Anemia due to chronic kidney disease, on chronic dialysis ([facility])   • Pneumonitis   • Personal history of noncompliance with medical treatment, presenting hazards to health   • Type 2 diabetes mellitus with circulatory disorder ([facility])   • Physical deconditioning   • ESRD on hemodialysis ([facility])   • DVT prophylaxis   • Anemia   • Ventilator associated pneumonia ([facility])   • Positive fecal occult blood test   • Yeast UTI   • Gangrene of both feet ([facility])   • Left foot pain   • 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 ([facility])   • On home oxygen therapy     Past Medical History:   Diagnosis Date   • Acute blood loss anemia 04/16/2017    Likely due to gastric oozing at this time. - Dr. Duarte (GI) was consulted and has now signed off, will follow up outpatient - pill colonoscopy showed AVMs - continue to monitor   • Acute cystitis with hematuria 03/31/2021 1/13: IV Rocephin 1 gm q 24 1/14 : transitioned  to omnicef 300mg. Urine cultures resulted and did not show growth. Omnicef discontinued as patient is asymptomatic   • Altered mental status 01/09/2022    - AMS on presentation - initial ABG pH 7.3, CO2 34 - Procal 0.29 - UA negative for acute cysitits -CTA head wnl  - Empiric Zosyn and Vancomycin -Lactate 2.5 on admission  - blood cultures no growth at 24 hours     • Anxiety    • CAD (coronary artery disease) 04/24/2021    S/P 3 stents 5/1/2021 for [facility] Continue ASA 81mg & Clopidogrel 75mg Continue Atorvastatin 40mg   • Carotid artery stenosis    • CHF (congestive heart failure) ([facility])    • Chronic obstructive lung disease ([facility])    • CKD  (chronic kidney disease) stage 4, GFR 15-29 ml/min ([facility])    • CKD (chronic kidney disease), symptom management only, stage 5 ([facility]) 10/05/2020    Results from last 7 days Lab Units 12/15/21 0548 12/14/21 1323 12/14/21 0916 CREATININE mg/dL 3.92* 3.21* 3.32*  Baseline creatinine 2-3 GFR 13-25 GFR 15 Dialysis MWF, sees Dr. Lauren Nephrology consult,, appreciate recommendations Continue Bumex 1mg bid daily Holding Bumex 2mg 4 times a week   • Colonic polyp    • Coronary arteriosclerosis    • Diabetes mellitus ([facility])    • Diabetic neuropathy ([facility])    • Ear pain, right 10/18/2021    - canal trauma due to patient scratching and DMT2 - added cortisporin ear drops   • Elevated troponin 10/12/2021    -most likely from CKD -Trending down -Neg chest pain   • Generalized abdominal pain 07/01/2022    Could be due to initiation of tube feeds vs dyspepsia vs abdominal cramps related to no PO intake due to intubation vs constipation Continue current laxative regiment  If no bowel movement by this afternoon will consider enema   • GERD (gastroesophageal reflux disease)    • GI bleed 05/13/2021    - GI will follow up outpatient - Protonix 40mg daily - Avoid medical DVT prophy and use mechanical at this time instead. - Continue to monitor - pill colonoscopy results showed AVMs   • History of transfusion    • Hypercholesterolemia    • Hyperosmolar hyperglycemic state (HHS) ([facility]) 06/25/2022    Serum glucose 605 on admission  Anion gap 16 PH 7.37 Bicarb 27.9 Continue fluids  Insulin drip with HHS protocol  Anion gap closed around 10 AM, received one dose of Levamir subq, will stop insulin drip after 2 hrs     • Hypertension    • Hypokalemia 05/27/2022    Will replace as needed. Will be cautious in the setting of ESRD to avoid need for emergency dialysis   Monitor Qtc intervals on EKG     • Hypomagnesemia 06/27/2021    Monitor and replace   • Morbid obesity ([facility])    • Nephrolithiasis    • On mechanically assisted ventilation ([facility])  06/26/2022    Vent management and sedation orders placed.  - [facility] intensivist group consulted for vent management appreciate recommendations  - plan to extubate today     • Peripheral vascular disease ([facility])    • Pleural effusion on right 06/26/2022    CXR on 6/30/22 read as a small upper left pulmonary edema vs early pneumonia.  Last Echo 1/2022 EF 61-65 % Continue to monitor  Procal slightly improved, CRP improved On Linezolid and meropenum      • PVD (peripheral vascular disease) ([facility])    • SIRS (systemic inflammatory response syndrome) ([facility]) 01/09/2022    Admission  - WBC 17.78   -   - RR 16 - 1/10: VSS/wnl - CXR - Mild pulm edema - Blood cultures no growth at 24 hours  - Procalcitonin 0.29 - UA : glucose 1000, negative Leucocytes/nitrate - Empiric Zosyn and Vancomcyin    • Sleep apnea    • Substance abuse ([facility])    • Vitamin D deficiency      Past Surgical History:   Procedure Laterality Date   • CARDIAC CATHETERIZATION N/A 07/14/2020   • CARDIAC CATHETERIZATION N/A 04/23/2021    Procedure: Left Heart Cath;  Surgeon: Melba Romo MD;  Location: [facility] CATH INVASIVE LOCATION;  Service: Cardiology;  Laterality: N/A;   • CARDIAC CATHETERIZATION N/A 04/30/2021    Procedure: Percutaneous Coronary Intervention;  Surgeon: Russell Voss MD;  Location: [facility] CATH INVASIVE LOCATION;  Service: Cardiovascular;  Laterality: N/A;   • CARDIAC CATHETERIZATION N/A 04/30/2021    Procedure: Stent NIKKI coronary;  Surgeon: Russell Voss MD;  Location: [facility] CATH INVASIVE LOCATION;  Service: Cardiovascular;  Laterality: N/A;   • CARDIAC CATHETERIZATION Left 11/13/2021    Procedure: Left Heart Cath;  Surgeon: Niall Rios MD;  Location: [facility] CATH INVASIVE LOCATION;  Service: Cardiology;  Laterality: Left;   • CAROTID STENT Left    • COLONOSCOPY     • COLONOSCOPY N/A 05/14/2021    Procedure: COLONOSCOPY;  Surgeon: Mingo Duarte MD;  Location: [facility] ENDOSCOPY;  Service:  Gastroenterology;  Laterality: N/A;   • CORONARY ARTERY BYPASS GRAFT N/A 2013    CABG X 3   • CYSTOSCOPY BLADDER STONE LITHOTRIPSY Bilateral    • ENDOSCOPY N/A 04/12/2021    Procedure: ESOPHAGOGASTRODUODENOSCOPY;  Surgeon: Mingo Duarte MD;  Location: [facility] ENDOSCOPY;  Service: Gastroenterology;  Laterality: N/A;   • ENDOSCOPY N/A 05/14/2021    Procedure: ESOPHAGOGASTRODUODENOSCOPY;  Surgeon: Mingo Duarte MD;  Location: [facility] ENDOSCOPY;  Service: Gastroenterology;  Laterality: N/A;   • ENDOSCOPY N/A 01/28/2022    Procedure: ESOPHAGOGASTRODUODENOSCOPY;  Surgeon: Mingo Duarte MD;  Location: [facility] ENDOSCOPY;  Service: Gastroenterology;  Laterality: N/A;   • HYSTERECTOMY     • INTERVENTIONAL RADIOLOGY PROCEDURE N/A 10/21/2021    Procedure: tunneled central venous catheter placement;  Surgeon: Donnie Robles MD;  Location: [facility] ANGIO INVASIVE LOCATION;  Service: Interventional Radiology;  Laterality: N/A;   • INTERVENTIONAL RADIOLOGY PROCEDURE N/A 01/27/2022    Procedure: tunneled central venous catheter placement;  Surgeon: Donnie Robles MD;  Location: [facility] INVASIVE LOCATION;  Service: Interventional Radiology;  Laterality: N/A;   • LAPAROSCOPIC TUBAL LIGATION     • TUNNELED VENOUS CATHETER PLACEMENT        General Information     Row Name 12/19/22 1530          Physical Therapy Time and Intention    Document Type evaluation  -HENRIQUE     Mode of Treatment individual therapy;physical therapy  -HENRIQUE     Row Name 12/19/22 1530          General Information    Patient Profile Reviewed yes  -HENRIQUE     Prior Level of Function independent:;feeding;grooming;dressing;bathing;transfer;w/c or scooter  -HENRIQUE     Existing Precautions/Restrictions fall  -HENRIQUE     Barriers to Rehab medically complex  -HENRIQUE     Row Name 12/19/22 1530          Living Environment    People in Home sibling(s)  sister  -HENRIQUE     Row Name 12/19/22 1530          Stairs Within Home, Primary    Stairs Comment, Within Home,  Primary Information from sister who lives with patient;pt inconsistent in ability to answer questions. Per sister since L foot surgery in September, pt able to propel her w/c and transfer by stand pivot w/c to toilet to w/c. Sister provided SBA while pt transferred onto shower chair in tubshower; Pt able to bathe herself. Pt lives in handicapped accessible apartment.  -     Row Name 12/19/22 1530          Cognition    Orientation Status (Cognition) oriented to;person  -     Row Name 12/19/22 1530          Safety Issues, Functional Mobility    Safety Issues Affecting Function (Mobility) ability to follow commands  -     Impairments Affecting Function (Mobility) endurance/activity tolerance;pain;strength;range of motion (ROM)  -           User Key  (r) = Recorded By, (t) = Taken By, (c) = Cosigned By    Initials Name Provider Type    Rebecca Monge PT Physical Therapist               Mobility     Row Name 12/19/22 1530          Bed Mobility    Bed Mobility rolling right  -     Rolling Right [place] (Bed Mobility) verbal cues;maximum assist (25% patient effort)  -HENRIQUE     Comment, (Bed Mobility) could not progress to EOB/OOB as pt drowsy and moderately difficult to keep roused. Also could not remove both wrist restraints at the same time for fear patient would pull out CVC.  -     Row Name 12/19/22 1530          Bed-Chair Transfer    Bed-Chair [place] (Transfers) not tested  -HENRIQUE     Comment, (Bed-Chair Transfer) did place bed in chair position  -[facility] Name 12/19/22 1530          Sit-Stand Transfer    Sit-Stand [place] (Transfers) unable to assess  -[facility] Name 12/19/22 1530          Gait/Stairs (Locomotion)    [place] Level (Gait) not tested  -           User Key  (r) = Recorded By, (t) = Taken By, (c) = Cosigned By    Initials Name Provider Type    Rebecca Monge PT Physical Therapist               Obj/Interventions     Row Name 12/19/22 1530          Range of Motion  Comprehensive    Comment, General Range of Motion AROM WFL BUE; PROM and AAROM WFL RLE: PROM WFL L hip; pt could not tolerate extended L knee due to pain;pt kept knee in ~ 20 degrees of flexion.  -     Row Name 12/19/22 1530          Strength Comprehensive (MMT)    Comment, General Manual Muscle Testing (MMT) Assessment Pt had difficulty following instructions for MMT: BUE: grossly 4-/5; RLE: grossly 2/5 LLE: resistance not given due to pain;observed at least 2-/5 with range assessment grossly  -     Row Name 12/19/22 1530          Balance    Balance Assessment sitting static balance;other (see comments)  bed in chair position  -     Position, Sitting Balance supported  -[facility] Name 12/19/22 1530          Sensory Assessment (Somatosensory)    Sensory Assessment (Somatosensory) unable/difficult to assess  sister reports pt does not have feeling in R foot  -           User Key  (r) = Recorded By, (t) = Taken By, (c) = Cosigned By    Initials Name Provider Type     Rebecca Hernandez, PT Physical Therapist               Goals/Plan     [facility] Name 12/19/22 1530          Bed Mobility Goal 1 (PT)    Activity/Assistive Device (Bed Mobility Goal 1, PT) sit to supine;supine to sit  -     [place] Level/Cues Needed (Bed Mobility Goal 1, PT) minimum assist (75% or more patient effort);moderate assist (50-74% patient effort)  -     Time Frame (Bed Mobility Goal 1, PT) by discharge  -     Strategies/Barriers (Bed Mobility Goal 1, PT) co-morbidities  -     Progress/Outcomes (Bed Mobility Goal 1, PT) goal not met  -[facility] Name 12/19/22 1530          Transfer Goal 1 (PT)    Activity/Assistive Device (Transfer Goal 1, PT) sit-to-stand/stand-to-sit;bed-to-chair/chair-to-bed;wheelchair transfer  -     [place] Level/Cues Needed (Transfer Goal 1, PT) minimum assist (75% or more patient effort);moderate assist (50-74% patient effort)  -     Time Frame (Transfer Goal 1, PT) 2 weeks  -     Progress/Outcome  (Transfer Goal 1, PT) goal not met  -[facility] Name 12/19/22 1530          ROM Goal 1 (PT)    ROM Goal 1 (PT) Pt will tolerate bed in chair position 30 minutes progressing to OOB in chair with VSS  -HENRIQUE     Time Frame (ROM Goal 1, PT) 2 weeks  -HENRIQUE     Progress/Outcome (ROM Goal 1, PT) goal not met  -[facility] Name 12/19/22 1530          Problem Specific Goal 1 (PT)    Problem Specific Goal 1 (PT) Pt will propel w/c 50ft or more with SBA with VSS  -     Time Frame (Problem Specific Goal 1, PT) 2 weeks;3 weeks  -HENRIQUE     Progress/Outcome (Problem Specific Goal 1, PT) goal not met  -[facility] Name 12/19/22 1530          Therapy Assessment/Plan (PT)    Planned Therapy Interventions (PT) balance training;bed mobility training;home exercise program;patient/family education;postural re-education;ROM (range of motion);strengthening;stretching;transfer training;wheelchair management/propulsion training  -           User Key  (r) = Recorded By, (t) = Taken By, (c) = Cosigned By    Initials Name Provider Type     Rebecca Hernandez, PT Physical Therapist               Clinical Impression     [facility] Name 12/19/22 1530          Pain    Pain Intervention(s) Repositioned;Rest;Nursing Notified  -     Additional Documentation Pain Scale: FACES Pre/Post-Treatment (Group)  -[facility] Name 12/19/22 1530          Pain Scale: FACES Pre/Post-Treatment    Pain: FACES Scale, Pretreatment 6-->hurts even more  -     Pain Location - Side/Orientation Left  -     Pain Location lower  -     Pain Location - residual limb  -     Pre/Posttreatment Pain Comment does not give pain rating just cries out when pain hits  -[facility] Name 12/19/22 1530          Plan of Care Review    Plan of Care Reviewed With patient  -     Outcome Evaluation PT evaluation completed. Pt groggy and moderately difficult to rouse at times. Sister provided PLOF information as pt very inconsistent in answering questions.  AROM WFL BUE and PROM and AAROM WFL RLE;  PROM WFL L hip;pt could not tolerate extending L knee and kept knee in 20 degrees of flexion. Pt required max assistance to roll to right. PT deferred EOB/OOB as pt groggy and could not be trusted with both wrist restraints untied at the same time. PT did place bed in chair position. Noted pt able to self correct trunk to midline and able to provide some pressure relief to bottom. Pt sat 15 minutes with VSS. Pt demonstrates decreased strength, range, and tolerance for functional mobility and activities. Pt will benefit from PT to  gradually improve bed mobility and transfer ability progressing to w/c mobility as tolerated. Anticipate patient will need additional rehab at discharge prior to return home: IRF vs SNF rehab to home vs LTACH pending progress with inpatient therapy.  -HENRIQUE     Row Name 12/19/22 8322          Therapy Assessment/Plan (PT)    Patient/Family Therapy Goals Statement (PT) Pt not verbalizing goals at time of evaluation.  -HENRIQUE     Rehab Potential (PT) good, to achieve stated therapy goals  -     Criteria for Skilled Interventions Met (PT) yes;meets criteria;skilled treatment is necessary  -     Therapy Frequency (PT) daily  x2 if tolerated  -     Predicted Duration of Therapy Intervention (PT) until discharge or goals met  -HENRIQUE     Row Name 12/19/22 1530          Vital Signs    Pre Systolic BP Rehab 124  -HENRIQUE     Pre Treatment Diastolic BP 84  -HENRIQUE     Intra Systolic BP Rehab 120  -HENRIQUE     Intra Treatment Diastolic BP 82  -HENRIQUE     Post Systolic BP Rehab 124  -HENRIQUE     Post Treatment Diastolic BP 65  -HENRIQUE     Pretreatment Heart Rate (beats/min) 93  -HENRIQUE     Posttreatment Heart Rate (beats/min) 94  -HENRIQUE     Pre SpO2 (%) 98  -HENRIQUE     O2 Delivery Pre Treatment nasal cannula  2lpm  -HENRIQUE     Intra SpO2 (%) 98  -HENRIQUE     O2 Delivery Intra Treatment nasal cannula  -HENRIQUE     Post SpO2 (%) 98  -HENRIQUE     O2 Delivery Post Treatment nasal cannula  -HENRIQUE     Pre Patient Position Supine  -HENRIQUE     Intra Patient Position Sitting   -HENRIQUE     Post Patient Position Supine  -HENRIQUE     Row Name 12/19/22 1530          Positioning and Restraints    Pre-Treatment Position in bed  -HENRIQUE     Post Treatment Position bed  -HENRIQUE     In Bed notified nsg;supine;call light within reach;with family/caregiver;patient within staff view  -HENRIQUE           User Key  (r) = Recorded By, (t) = Taken By, (c) = Cosigned By    Initials Name Provider Type    Rebecca Monge PT Physical Therapist               Outcome Measures     Row Name 12/19/22 1530 12/19/22 0800       How much help from another person do you currently need...    Turning from your back to your side while in flat bed without using bedrails? 2  -HENRIQUE 1  -KA    Moving from lying on back to sitting on the side of a flat bed without bedrails? 2  -HENRIQUE 1  -KA    Moving to and from a bed to a chair (including a wheelchair)? 1  -HENRIQUE 1  -KA    Standing up from a chair using your arms (e.g., wheelchair, bedside chair)? 1  -HENRIQUE 1  -KA    Climbing 3-5 steps with a railing? 1  -HENRIQUE 1  -KA    To walk in hospital room? 1  -HENRIQUE 1  -KA    AM-PAC 6 Clicks Score (PT) 8  -HENRIQUE 6  -KA    Highest level of mobility 3 --> Sat at edge of bed  -HENRIQUE 2 --> Bed activities/dependent transfer  -KA    Row Name 12/19/22 1530          Functional Assessment    Outcome Measure Options AM-PAC 6 Clicks Basic Mobility (PT)  -HENRIQUE           User Key  (r) = Recorded By, (t) = Taken By, (c) = Cosigned By    Initials Name Provider Type    Rebecca Monge PT Physical Therapist    Omaira Avila, RN Registered Nurse                             Physical Therapy Education     Title: PT OT SLP Therapies (Not Started)     Topic: Physical Therapy (Not Started)     Point: Mobility training (Not Started)     Learner Progress:  Not documented in this visit.          Point: Home exercise program (Not Started)     Learner Progress:  Not documented in this visit.          Point: Body mechanics (Not Started)     Learner Progress:  Not documented in this visit.           Point: Precautions (Not Started)     Learner Progress:  Not documented in this visit.                          PT Recommendation and Plan  Planned Therapy Interventions (PT): balance training, bed mobility training, home exercise program, patient/family education, postural re-education, ROM (range of motion), strengthening, stretching, transfer training, wheelchair management/propulsion training  Plan of Care Reviewed With: patient  Outcome Evaluation: PT evaluation completed. Pt groggy and moderately difficult to rouse at times. Sister provided PLOF information as pt very inconsistent in answering questions.  AROM WFL BUE and PROM and AAROM WFL RLE; PROM WFL L hip;pt could not tolerate extending L knee and kept knee in 20 degrees of flexion. Pt required max assistance to roll to right. PT deferred EOB/OOB as pt groggy and could not be trusted with both wrist restraints untied at the same time. PT did place bed in chair position. Noted pt able to self correct trunk to midline and able to provide some pressure relief to bottom. Pt sat 15 minutes with VSS. Pt demonstrates decreased strength, range, and tolerance for functional mobility and activities. Pt will benefit from PT to  gradually improve bed mobility and transfer ability progressing to w/c mobility as tolerated. Anticipate patient will need additional rehab at discharge prior to return home: IRF vs SNF rehab to home vs LTACH pending progress with inpatient therapy.     Time Calculation:    PT Charges     Row Name 12/19/22 1712 12/19/22 0944          Time Calculation    Start Time 1530  -HENRIQUE --     Stop Time 1626  -HENRIQUE --     Time Calculation (min) 56 min  -HENRIQUE --     PT Received On 12/19/22  - --     PT - Next Appointment -- 12/20/22  -     PT Goal Re-Cert Due Date 01/01/23  -HENRIQUE --        Time Calculation- PT    Total Timed Code Minutes- PT 0 minute(s)  -HENRIQUE --        Untimed Charges    PT Eval/Re-eval Minutes 56  -HENRIQUE --        Total Minutes    Untimed  Charges Total Minutes 56  -HENRIQUE --      Total Minutes 56  -HENRIQUE --           User Key  (r) = Recorded By, (t) = Taken By, (c) = Cosigned By    Initials Name Provider Type    Rebecca Monge, PT Physical Therapist    Bessie Hay, OT Occupational Therapist              Therapy Charges for Today     Code Description Service Date Service Provider Modifiers Qty    [id number]  PT THER SUPP EA 15 MIN 12/19/2022 Rebecca Hernandez, PT GP 1    [id number]  PT EVAL MOD COMPLEXITY 4 12/19/2022 Rebecca Hernandez, PT GP 1          PT G-Codes  Outcome Measure Options: AM-PAC 6 Clicks Basic Mobility (PT)  AM-PAC 6 Clicks Score (PT): 8  PT Discharge Summary  Anticipated Discharge Disposition (PT): LTCH (long-term care hospital), inpatient rehabilitation facility, extended care facility    Rebecca Hernandez, PT  12/19/2022

## 2022-12-20 LAB
ALBUMIN SERPL-MCNC: 3.7 G/DL (ref 3.5–5.2)
ALBUMIN/GLOB SERPL: 0.7 G/DL
ALP SERPL-CCNC: 241 U/L (ref 39–117)
ALT SERPL W P-5'-P-CCNC: <5 U/L (ref 1–33)
ANION GAP SERPL CALCULATED.3IONS-SCNC: 21 MMOL/L (ref 5–15)
AST SERPL-CCNC: 11 U/L (ref 1–32)
BACTERIA SPEC AEROBE CULT: NORMAL
BACTERIA SPEC AEROBE CULT: NORMAL
BASOPHILS # BLD MANUAL: 0.27 10*3/MM3 (ref 0–0.2)
BASOPHILS NFR BLD MANUAL: 2 % (ref 0–1.5)
BILIRUB SERPL-MCNC: 0.4 MG/DL (ref 0–1.2)
BUN SERPL-MCNC: 47 MG/DL (ref 8–23)
BUN/CREAT SERPL: 10.6 (ref 7–25)
CALCIUM SPEC-SCNC: 10 MG/DL (ref 8.6–10.5)
CHLORIDE SERPL-SCNC: 92 MMOL/L (ref 98–107)
CO2 SERPL-SCNC: 22 MMOL/L (ref 22–29)
CREAT SERPL-MCNC: 4.43 MG/DL (ref 0.57–1)
CRP SERPL-MCNC: 10.06 MG/DL (ref 0–0.5)
DEPRECATED RDW RBC AUTO: 50.2 FL (ref 37–54)
EGFRCR SERPLBLD CKD-EPI 2021: 10.6 ML/MIN/1.73
ERYTHROCYTE [DISTWIDTH] IN BLOOD BY AUTOMATED COUNT: 16.3 % (ref 12.3–15.4)
GLOBULIN UR ELPH-MCNC: 5.2 GM/DL
GLUCOSE BLDC GLUCOMTR-MCNC: 222 MG/DL (ref 70–130)
GLUCOSE BLDC GLUCOMTR-MCNC: 265 MG/DL (ref 70–130)
GLUCOSE BLDC GLUCOMTR-MCNC: 275 MG/DL (ref 70–130)
GLUCOSE BLDC GLUCOMTR-MCNC: 286 MG/DL (ref 70–130)
GLUCOSE BLDC GLUCOMTR-MCNC: 316 MG/DL (ref 70–130)
GLUCOSE SERPL-MCNC: 244 MG/DL (ref 65–99)
HCT VFR BLD AUTO: 42.6 % (ref 34–46.6)
HGB BLD-MCNC: 13.1 G/DL (ref 12–15.9)
LYMPHOCYTES # BLD MANUAL: 2.7 10*3/MM3 (ref 0.7–3.1)
LYMPHOCYTES NFR BLD MANUAL: 9 % (ref 5–12)
MCH RBC QN AUTO: 26.7 PG (ref 26.6–33)
MCHC RBC AUTO-ENTMCNC: 30.8 G/DL (ref 31.5–35.7)
MCV RBC AUTO: 86.8 FL (ref 79–97)
MONOCYTES # BLD: 1.22 10*3/MM3 (ref 0.1–0.9)
NEUTROPHILS # BLD AUTO: 9.33 10*3/MM3 (ref 1.7–7)
NEUTROPHILS NFR BLD MANUAL: 69 % (ref 42.7–76)
PLAT MORPH BLD: NORMAL
PLATELET # BLD AUTO: 281 10*3/MM3 (ref 140–450)
PMV BLD AUTO: 8.4 FL (ref 6–12)
POTASSIUM SERPL-SCNC: 5 MMOL/L (ref 3.5–5.2)
PROT SERPL-MCNC: 8.9 G/DL (ref 6–8.5)
RBC # BLD AUTO: 4.91 10*6/MM3 (ref 3.77–5.28)
RBC MORPH BLD: NORMAL
SODIUM SERPL-SCNC: 135 MMOL/L (ref 136–145)
VARIANT LYMPHS NFR BLD MANUAL: 20 % (ref 19.6–45.3)
WBC MORPH BLD: NORMAL
WBC NRBC COR # BLD: 13.52 10*3/MM3 (ref 3.4–10.8)

## 2022-12-20 PROCEDURE — 85027 COMPLETE CBC AUTOMATED: CPT | Performed by: ORTHOPAEDIC SURGERY

## 2022-12-20 PROCEDURE — 97167 OT EVAL HIGH COMPLEX 60 MIN: CPT

## 2022-12-20 PROCEDURE — 63710000001 INSULIN ASPART PER 5 UNITS: Performed by: ORTHOPAEDIC SURGERY

## 2022-12-20 PROCEDURE — 80053 COMPREHEN METABOLIC PANEL: CPT | Performed by: ORTHOPAEDIC SURGERY

## 2022-12-20 PROCEDURE — 82962 GLUCOSE BLOOD TEST: CPT

## 2022-12-20 PROCEDURE — 99024 POSTOP FOLLOW-UP VISIT: CPT | Performed by: ORTHOPAEDIC SURGERY

## 2022-12-20 PROCEDURE — 97110 THERAPEUTIC EXERCISES: CPT

## 2022-12-20 PROCEDURE — 86140 C-REACTIVE PROTEIN: CPT | Performed by: ORTHOPAEDIC SURGERY

## 2022-12-20 PROCEDURE — 85007 BL SMEAR W/DIFF WBC COUNT: CPT | Performed by: ORTHOPAEDIC SURGERY

## 2022-12-20 PROCEDURE — 94799 UNLISTED PULMONARY SVC/PX: CPT

## 2022-12-20 PROCEDURE — 25010000002 HEPARIN (PORCINE) PER 1000 UNITS: Performed by: ORTHOPAEDIC SURGERY

## 2022-12-20 PROCEDURE — 94760 N-INVAS EAR/PLS OXIMETRY 1: CPT

## 2022-12-20 RX ORDER — ALBUMIN (HUMAN) 12.5 G/50ML
12.5 SOLUTION INTRAVENOUS AS NEEDED
Status: ACTIVE | OUTPATIENT
Start: 2022-12-21 | End: 2022-12-21

## 2022-12-20 RX ORDER — HEPARIN SODIUM 1000 [USP'U]/ML
4000 INJECTION, SOLUTION INTRAVENOUS; SUBCUTANEOUS AS NEEDED
Status: DISCONTINUED | OUTPATIENT
Start: 2022-12-21 | End: 2022-12-23

## 2022-12-20 RX ADMIN — GABAPENTIN 300 MG: 300 CAPSULE ORAL at 20:00

## 2022-12-20 RX ADMIN — OXYCODONE HYDROCHLORIDE 10 MG: 10 TABLET ORAL at 12:41

## 2022-12-20 RX ADMIN — HEPARIN SODIUM 5000 UNITS: 5000 INJECTION INTRAVENOUS; SUBCUTANEOUS at 13:46

## 2022-12-20 RX ADMIN — MEMANTINE 5 MG: 5 TABLET ORAL at 20:00

## 2022-12-20 RX ADMIN — CARVEDILOL 6.25 MG: 6.25 TABLET, FILM COATED ORAL at 18:37

## 2022-12-20 RX ADMIN — HEPARIN SODIUM 5000 UNITS: 5000 INJECTION INTRAVENOUS; SUBCUTANEOUS at 20:00

## 2022-12-20 RX ADMIN — METRONIDAZOLE 500 MG: 500 INJECTION, SOLUTION INTRAVENOUS at 11:25

## 2022-12-20 RX ADMIN — CARVEDILOL 6.25 MG: 6.25 TABLET, FILM COATED ORAL at 08:39

## 2022-12-20 RX ADMIN — ATORVASTATIN CALCIUM 20 MG: 20 TABLET, FILM COATED ORAL at 20:00

## 2022-12-20 RX ADMIN — DOCUSATE SODIUM 100 MG: 100 CAPSULE, LIQUID FILLED ORAL at 20:00

## 2022-12-20 RX ADMIN — INSULIN ASPART 6 UNITS: 100 INJECTION, SOLUTION INTRAVENOUS; SUBCUTANEOUS at 11:17

## 2022-12-20 RX ADMIN — DOCUSATE SODIUM 100 MG: 100 CAPSULE, LIQUID FILLED ORAL at 08:40

## 2022-12-20 RX ADMIN — LOSARTAN POTASSIUM 25 MG: 25 TABLET, FILM COATED ORAL at 08:40

## 2022-12-20 RX ADMIN — SEVELAMER CARBONATE 800 MG: 800 TABLET, FILM COATED ORAL at 18:37

## 2022-12-20 RX ADMIN — Medication 10 ML: at 20:00

## 2022-12-20 RX ADMIN — METRONIDAZOLE 500 MG: 500 INJECTION, SOLUTION INTRAVENOUS at 03:20

## 2022-12-20 RX ADMIN — HEPARIN SODIUM 5000 UNITS: 5000 INJECTION INTRAVENOUS; SUBCUTANEOUS at 06:15

## 2022-12-20 RX ADMIN — OXYCODONE HYDROCHLORIDE 10 MG: 10 TABLET ORAL at 08:38

## 2022-12-20 RX ADMIN — Medication 10 ML: at 08:40

## 2022-12-20 RX ADMIN — OXYCODONE HYDROCHLORIDE 10 MG: 10 TABLET ORAL at 21:51

## 2022-12-20 RX ADMIN — SEVELAMER CARBONATE 800 MG: 800 TABLET, FILM COATED ORAL at 11:24

## 2022-12-20 RX ADMIN — GABAPENTIN 300 MG: 300 CAPSULE ORAL at 08:39

## 2022-12-20 RX ADMIN — OXYCODONE HYDROCHLORIDE 10 MG: 10 TABLET ORAL at 16:37

## 2022-12-20 RX ADMIN — INSULIN ASPART 4 UNITS: 100 INJECTION, SOLUTION INTRAVENOUS; SUBCUTANEOUS at 08:37

## 2022-12-20 RX ADMIN — DULOXETINE HYDROCHLORIDE 20 MG: 20 CAPSULE, DELAYED RELEASE ORAL at 08:38

## 2022-12-20 RX ADMIN — TEMAZEPAM 15 MG: 15 CAPSULE ORAL at 21:51

## 2022-12-20 RX ADMIN — SEVELAMER CARBONATE 800 MG: 800 TABLET, FILM COATED ORAL at 08:40

## 2022-12-20 RX ADMIN — MEMANTINE 5 MG: 5 TABLET ORAL at 08:38

## 2022-12-20 RX ADMIN — INSULIN ASPART 7 UNITS: 100 INJECTION, SOLUTION INTRAVENOUS; SUBCUTANEOUS at 16:44

## 2022-12-20 RX ADMIN — PANTOPRAZOLE SODIUM 40 MG: 40 TABLET, DELAYED RELEASE ORAL at 20:00

## 2022-12-20 RX ADMIN — FUROSEMIDE 80 MG: 40 TABLET ORAL at 08:38

## 2022-12-20 NOTE — PLAN OF CARE
Goal Outcome Evaluation:  Plan of Care Reviewed With: patient     Problem: Adult Inpatient Plan of Care  Goal: Plan of Care Review  Outcome: Ongoing, Progressing  Flowsheets (Taken 12/20/2022 1322)  Progress: improving  Plan of Care Reviewed With: patient  Outcome Evaluation: nsg approves LE ther ex. pt in restraints but seems to be following commands. pt participated in LE ther ex, AAROM. pt having much pain in L LE with movement. no new goals met at this time. pt would continue to benefit from PT services.

## 2022-12-20 NOTE — PROGRESS NOTES
[facility]  [address] [place] [address]. [address]  T - [phone]  F - [phone]     Progress Note          PATIENT  DEMOGRAPHICS   PATIENT NAME: Yamileth Slater                      PHYSICIAN: Ashley Coker MD  : 1960  MRN: [id number]   LOS: 5 days    Patient Care Team:  Kiersten Wilson APRN as PCP - General (Family Medicine)  Subjective   SUBJECTIVE   Patient remains  confused and  restrained because she tries to pull on lines.          Objective   OBJECTIVE   Vital Signs  Temp:  [97 °F (36.1 °C)-98.5 °F (36.9 °C)] 98.1 °F (36.7 °C)  Heart Rate:  [] 91  Resp:  [14-22] 22  BP: ()/(53-84) 133/82    Flowsheet Rows    Flowsheet Row First Filed Value   Admission Height 157.5 cm (62\") Documented at 12/15/2022 1700   Admission Weight 124 kg (273 lb 4.8 oz) Documented at 12/15/2022 1700           I/O last 3 completed shifts:  In: 784 [P.O.:270; I.V.:14; IV Piggyback:500]  Out: 3300 [Urine:1000; Other:2300]    PHYSICAL EXAM    Physical Exam  Vitals reviewed.   Constitutional:       Appearance: Normal appearance.   HENT:      Head: Normocephalic and atraumatic.      Right Ear: Tympanic membrane normal.      Left Ear: Tympanic membrane normal.      Nose: Nose normal.      Mouth/Throat:      Mouth: Mucous membranes are moist.   Eyes:      Pupils: Pupils are equal, round, and reactive to light.   Cardiovascular:      Rate and Rhythm: Normal rate.      Pulses: Normal pulses.      Heart sounds: Normal heart sounds.   Pulmonary:      Effort: Pulmonary effort is normal.      Breath sounds: Normal breath sounds.   Abdominal:      General: Abdomen is flat. Bowel sounds are normal.      Palpations: Abdomen is soft.   Musculoskeletal:         General: Normal range of motion.      Cervical back: Normal range of motion and neck supple.      Comments: Left BKA   Skin:     General: Skin is warm and dry.      Capillary Refill: Capillary refill takes less than 2 seconds.    Neurological:      General: No focal deficit present.      Mental Status: She is alert.   Psychiatric:         Mood and Affect: Mood normal.         RESULTS   Results Review:    Results from last 7 days   Lab Units 12/20/22  0522 12/19/22  0600 12/18/22  0549   SODIUM mmol/L 135* 133* 132*   POTASSIUM mmol/L 5.0 4.3 4.6   CHLORIDE mmol/L 92* 95* 91*   CO2 mmol/L 22.0 22.0 22.0   BUN mg/dL 47* 57* 39*   CREATININE mg/dL 4.43* 4.98* 4.19*   CALCIUM mg/dL 10.0 9.9 10.1   BILIRUBIN mg/dL 0.4 0.4 0.5   ALK PHOS U/L 241* 252* 306*   ALT (SGPT) U/L <5 <5 6   AST (SGOT) U/L 11 16 18   GLUCOSE mg/dL 244* 201* 172*       Estimated Creatinine Clearance: 14.8 mL/min (A) (by C-G formula based on SCr of 4.43 mg/dL (H)).    Results from last 7 days   Lab Units 12/16/22  0259   MAGNESIUM mg/dL 1.9   PHOSPHORUS mg/dL 8.9*             Results from last 7 days   Lab Units 12/20/22  0522 12/19/22  0600 12/18/22  0825 12/18/22  0549 12/17/22  0458   WBC 10*3/mm3 13.52* 9.77 10.68 10.48 9.36   HEMOGLOBIN g/dL 13.1 12.3 12.7 12.8 11.0*   PLATELETS 10*3/mm3 281 259 253 261 219               Imaging Results (Last 24 Hours)     ** No results found for the last 24 hours. **           MEDICATIONS    atorvastatin, 20 mg, Oral, Nightly  carvedilol, 6.25 mg, Oral, BID With Meals  cefepime, 1 g, Intravenous, Q24H  docusate sodium, 100 mg, Oral, BID  DULoxetine, 20 mg, Oral, Daily  furosemide, 80 mg, Oral, Once per day on Sun Tue Thu Sat  gabapentin, 300 mg, Oral, Once per day on Mon Wed Fri   And  gabapentin, 300 mg, Oral, 2 times per day on Sun Tue Thu Sat  heparin (porcine), 5,000 Units, Subcutaneous, Q8H  Insulin Aspart, 0-9 Units, Subcutaneous, TID AC  losartan, 25 mg, Oral, Daily  memantine, 5 mg, Oral, BID  metroNIDAZOLE, 500 mg, Intravenous, Q8H  pantoprazole, 40 mg, Oral, Nightly  sevelamer, 800 mg, Oral, TID With Meals  sodium chloride, 10 mL, Intravenous, Q12H      O2, 2 L/min  Pharmacy to dose vancomycin,   sodium chloride, 1,000  mL        Assessment & Plan   ASSESSMENT / PLAN      Venous insufficiency    Coronary artery disease involving native coronary artery of native heart without angina pectoris    Morbid obesity with BMI of 50.0-59.9, adult (HCC)    Carotid artery disease (HCC)    PAD (peripheral artery disease) (HCC)    Pulmonary hypertension (HCC)    MIKE and COPD overlap syndrome (HCC)    ESRD on hemodialysis (HCC)    Left foot pain    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  She is currently dialyzing at [facility]. her current access is tunneled catheter.  She has prior right upper extremity failed AV fistula. She has recurrent hyperkalemia and had hd Friday and saturday. k remains stable today at 5. HD planned for tomorrow  Losartan lowered due to high k     keep diuretic non dialysis days (makes reasonable urine and for kaliuresis)     2- anemia chronic kidney disease patient has history of B12 deficiency and AV malformation.  hemoglobin is more than 10.  Epogen has been discontinued      3.diabetes mellitus type 2 with nonhealing wound on the left foot and now  left BKA     4. CAD     5. ckd mbd on sevelamer in out patient setting     6. htn stable on coreg              This document has been electronically signed by Ashley Coker MD on December 20, 2022 08:55 CST      I have reviewed the case with the resident. I have also examined and seen  the patient.  I agree with the assessment and plan as documented in the resident's note.         This document has been electronically signed by Ace Lauren MD on December 20, 2022 10:40 CST

## 2022-12-20 NOTE — PROGRESS NOTES
ORTHOPEDIC PROGRESS NOTE:    Name:  Yamileth Slater  Date:    2022  Date of admission:  12/15/2022    Post op day:  4 Days Post-Op  Procedure:    Procedure(s) (LRB):  AMPUTATION BELOW KNEE, LEFT (Left)    Subjective:  No fever or chills  Still with altered mental status      Vitals:     Vitals:    22 0700   BP: 133/82   Pulse: 91   Resp: 22   Temp:    SpO2: 99%      Temp (24hrs), Av.9 °F (36.6 °C), Min:97 °F (36.1 °C), Max:98.5 °F (36.9 °C)      Exam:    Combative at times per nursing.  Still requiring restraints.  Dressing clean and dry (dressing change per nursing reports wound is C/D/I, no erythema)    Results from last 7 days   Lab Units 22  0522 22  0600 22  0549   CRP mg/dL 10.06* 12.95* 16.43*         ASSESSMENT:  Active Hospital Problems    Diagnosis  POA   • **Venous insufficiency [I87.2]  Yes   • Left foot pain [M79.672]  Yes     Added automatically from request for surgery [id number]     • 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 ([facility]) [L97.524]  Yes     Added automatically from request for surgery [id number]     • On home oxygen therapy [Z99.81]  Not Applicable     Added automatically from request for surgery [id number]     • ESRD on hemodialysis ([facility]) [N18.6, Z99.2]  Not Applicable     -Receives hemodialysis MWF at [facility] in [place]  Missed dialysis per family prior to arrival   Nephrology consulted appreciated recs          • MIKE and COPD overlap syndrome ([facility]) [G47.33, J44.9]  Yes     -Home Trilogy/CPAP continue   -Home 3L oxygen dependent. Maintain strict Sats between 88-92%       • Pulmonary hypertension ([facility]) [I27.20]  Yes   • Coronary artery disease involving native coronary artery of native heart without angina pectoris [I25.10]  Yes     - S/P CABG, Bilateral carotid artery stenosis w/ 2 stents on left side,  PAD (peripheral artery disease) with stent in right upper leg  - Cardiology on board       • Morbid obesity with BMI of 50.0-59.9, adult ([facility])  [E66.01, Z68.43]  Not Applicable     Body mass index is 56.52 kg/m².  - Consistent carb, cardiac, renal diet     • Carotid artery disease (HCC) [I77.9]  Yes     -Status post stenting  -Continue home medications     • PAD (peripheral artery disease) (HCC) [I73.9]  Yes     · Continue ASA 81mg, Clopidogrel 75mg, Atorvastatin 40mg           PLAN:    S/p BKA  Continue dressing changes.  Still with altered mental status per the sister.  Continue medical management  DVT prophylaxis  Mobility as tolerated    12/20/22 at 07:37 CST by Huy White MD

## 2022-12-20 NOTE — PROGRESS NOTES
[facility]  INPATIENT PROGRESS NOTE    Length of Stay: 5  Date of Admission: 12/15/2022  Primary Care Physician: Kiersten Wilson APRN    Subjective   Chief Complaint: Left foot pain secondary to necrosis  HPI: Patient remains at least somewhat confused.  Talks to me some today.  Still has a blank stare part of the time.    As of today, 12/20/22  Review of Systems   Unable to perform ROS: Mental status change        All pertinent negatives and positives are as above. All other systems have been reviewed and are negative unless otherwise stated.    Objective    Temp:  [97 °F (36.1 °C)-98.5 °F (36.9 °C)] 97.6 °F (36.4 °C)  Heart Rate:  [] 83  Resp:  [14-23] 23  BP: ()/(52-84) 108/58    AM-PAC 6 Clicks Score (PT): 8 (12/19/22 1530)    As of today, 12/20/22  Physical Exam  Vitals reviewed.   Constitutional:       Appearance: She is well-developed.   HENT:      Head: Normocephalic and atraumatic.   Eyes:      Pupils: Pupils are equal, round, and reactive to light.   Cardiovascular:      Rate and Rhythm: Normal rate and regular rhythm.      Heart sounds: Normal heart sounds. No murmur heard.    No friction rub. No gallop.   Pulmonary:      Effort: Pulmonary effort is normal. No respiratory distress.      Breath sounds: Normal breath sounds. No wheezing or rales.   Chest:      Chest wall: No tenderness.   Abdominal:      General: Bowel sounds are normal. There is no distension.      Palpations: Abdomen is soft.      Tenderness: There is no abdominal tenderness. There is no guarding.   Musculoskeletal:      Cervical back: Normal range of motion and neck supple.      Comments: Left BKA.  Dressing clean dry and intact.   Skin:     General: Skin is warm and dry.   Neurological:      Mental Status: She is alert.      Comments: Answer some questions.  Still has a blank stare.   Psychiatric:      Comments: Answer some questions.  Still has a  skye huerta.           Results Review:  I have reviewed the labs, radiology results, and diagnostic studies.    Laboratory Data:   Results from last 7 days   Lab Units 12/20/22  0522 12/19/22  0600 12/18/22  0549   SODIUM mmol/L 135* 133* 132*   POTASSIUM mmol/L 5.0 4.3 4.6   CHLORIDE mmol/L 92* 95* 91*   CO2 mmol/L 22.0 22.0 22.0   BUN mg/dL 47* 57* 39*   CREATININE mg/dL 4.43* 4.98* 4.19*   GLUCOSE mg/dL 244* 201* 172*   CALCIUM mg/dL 10.0 9.9 10.1   BILIRUBIN mg/dL 0.4 0.4 0.5   ALK PHOS U/L 241* 252* 306*   ALT (SGPT) U/L <5 <5 6   AST (SGOT) U/L 11 16 18   ANION GAP mmol/L 21.0* 16.0* 19.0*     Estimated Creatinine Clearance: 14.8 mL/min (A) (by C-G formula based on SCr of 4.43 mg/dL (H)).  Results from last 7 days   Lab Units 12/16/22  0259   MAGNESIUM mg/dL 1.9   PHOSPHORUS mg/dL 8.9*         Results from last 7 days   Lab Units 12/20/22  0522 12/19/22  0600 12/18/22  0825 12/18/22  0549 12/17/22  0458   WBC 10*3/mm3 13.52* 9.77 10.68 10.48 9.36   HEMOGLOBIN g/dL 13.1 12.3 12.7 12.8 11.0*   HEMATOCRIT % 42.6 38.7 41.4 42.2 36.6   PLATELETS 10*3/mm3 281 259 253 261 219           Culture Data:   No results found for: BLOODCX  No results found for: URINECX  No results found for: RESPCX  No results found for: WOUNDCX  No results found for: STOOLCX  No components found for: BODYFLD    Radiology Data:   Imaging Results (Last 24 Hours)     ** No results found for the last 24 hours. **          I have reviewed the patient's current medications.     Assessment/Plan     Active Hospital Problems    Diagnosis    • **Venous insufficiency    • Left foot pain    • 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 (HCC)    • On home oxygen therapy    • ESRD on hemodialysis ([facility])    • MIKE and COPD overlap syndrome ([facility])    • Pulmonary hypertension ([facility])    • Coronary artery disease involving native coronary artery of native heart without angina pectoris    • Morbid obesity with BMI of 50.0-59.9, adult ([facility])    • Carotid artery  disease (HCC)    • PAD (peripheral artery disease) ([facility])        Plan:  1.  Left foot necrosis: Status post BKA.  Continue antibiotics.  2.  End-stage renal disease: Continue hemodialysis.  Appreciate nephrology help.  3.  Diabetes mellitus: Continue current.  4.  Hypertension: Continue losartan.  5.  Altered was: Improving.  Likely anesthesia reaction.  Patient's sister states that this is common for her.  6.  Chronic pain: Continue current pain management for now.  7.  DVT prophylaxis: Heparin    To floor today.    The patient was evaluated during the global COVID-19 pandemic, and the diagnosis was suspected/considered upon their initial presentation.  Evaluation, treatment, and testing were consistent with current guidelines for patients who present with complaints or symptoms that may be related to COVID-19.    I confirmed that the patient's Advance Care Plan is present, code status is documented, or surrogate decision maker is listed in the patient's medical record.       Discharge Planning: I expect patient to be discharged to skilled facility versus LTAC in 2-3 days.        This document has been electronically signed by Jef Ramires MD on December 20, 2022 12:38 CST

## 2022-12-20 NOTE — PAYOR COMM NOTE
Karmen Moreno RN [facility]  [phone]       Phone  [phone]       Fax  Cont. Stay REview      Yamileth Slater (62 y.o. Female)     Date of Birth   1960    Social Security Number       Address   [address] [place] [address]    Home Phone   [phone]    MRN   [id number]       Spiritism   None    Marital Status   Legally                             Admission Date   12/15/22    Admission Type   Urgent    Admitting Provider   Nestor Richards MD    Attending Provider   Nestor Richards MD    Department, Room/Bed   [facility] CRITICAL CARE STEPDOWN, 11/A       Discharge Date       Discharge Disposition       Discharge Destination                               Attending Provider: Nestor Richards MD    Allergies: Adhesive Tape, Nsaids, Latex, Other, Wound Dressing Adhesive    Isolation: Contact   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), 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)   Code Status: CPR    Ht: 157.5 cm (62.01\")   Wt: 105 kg (231 lb 4.2 oz)    Admission Cmt: None   Principal Problem: Venous insufficiency [I87.2]                 Active Insurance as of 12/15/2022     Primary Coverage     Payor Plan Insurance Group Employer/Plan Group    ANTHEM MEDICARE REPLACEMENT ANTHEM MEDICARE ADVANTAGE [id number]     Payor Plan Address Payor Plan Phone Number Payor Plan Fax Number Effective Dates    [address] [phone]  1/1/2022 - None Entered    [facility] [id number]       Subscriber Name Subscriber Birth Date Member ID       YAMILETH SLATER ARMAAN 1960 [id number]           Secondary Coverage     Payor Plan Insurance Group Employer/Plan Group    [place]      Payor Plan Address Payor Plan Phone Number Payor Plan Fax Number Effective Dates    [address] [phone]  6/28/2019 - None Entered    [facility] [id number]       Subscriber Name Subscriber Birth Date Member ID       YAMILETH SLATER ARMAAN 1960  [id number]                 Emergency Contacts      (Rel.) Home Phone Work Phone Mobile Phone    RAMONA KHAN (Sister) -- -- [phone]    Yamileth Chavez (Daughter) [phone] -- [phone]    Suzanne Rivera (Daughter) [phone] -- [phone]            Vital Signs (last day)     Date/Time Temp Temp src Pulse Resp BP Patient Position SpO2    12/20/22 1100 -- -- 85 23 103/59 Lying 95    12/20/22 1000 -- -- 83 17 103/52 Lying 95    12/20/22 0900 -- -- 94 15 110/57 Lying 95    12/20/22 0800 97.6 (36.4) Oral 93 16 125/56 Lying 96    12/20/22 0700 -- -- 91 22 133/82 Lying 99    12/20/22 0600 -- -- 89 20 121/73 -- 100    12/20/22 0500 -- -- 92 -- 131/81 -- 100    12/20/22 0400 -- -- 96 16 110/83 -- 100    12/20/22 0300 98.1 (36.7) Oral 95 18 120/67 -- 100    12/20/22 0200 -- -- 92 -- 125/65 -- 100    12/20/22 0100 -- -- 91 -- 136/63 -- 99    12/20/22 0000 98.2 (36.8) Axillary 88 17 120/81 -- 100    12/19/22 2300 -- -- 92 16 122/58 -- 98    12/19/22 2200 -- -- 92 22 130/71 -- 99    12/19/22 2100 -- -- 94 17 129/75 -- 100    12/19/22 2000 97.9 (36.6) Oral 89 18 140/67 Lying 98    12/19/22 1900 -- -- 93 -- 121/58 -- 98    12/19/22 1800 -- -- 92 -- 107/57 -- 98    12/19/22 1700 -- -- 92 -- 116/72 -- 100    12/19/22 1600 98.5 (36.9) Axillary 95 15 120/82 Lying 99    12/19/22 1500 -- -- 97 -- 124/84 -- 98    12/19/22 1400 -- -- 105 -- -- -- 99    12/19/22 1300 97 (36.1) Axillary 100 14 97/57 Lying 99    12/19/22 1241 -- -- 107 14 107/70 Lying --    12/19/22 1215 -- -- 93 16 102/60 Lying --    12/19/22 1204 -- -- -- -- 82/53 -- --    12/19/22 1200 98 (36.7) Axillary 93 22 81/60 Lying --    12/19/22 1145 -- -- 93 16 96/60 Lying --    12/19/22 1115 -- -- 88 14 95/58 Lying --    12/19/22 1100 -- -- 86 -- 119/64 -- 97    12/19/22 1059 -- -- -- -- 119/58 -- --    12/19/22 1045 -- -- 97 18 89/71 Lying --    12/19/22 1015 -- -- 93 16 93/66 Lying --    12/19/22 1000 -- -- 97 -- 110/68 -- 100    12/19/22 0945 -- -- 99  16 110/72 Lying --    12/19/22 0915 -- -- 96 16 139/80 Lying --    12/19/22 0900 -- -- 93 -- 153/75 -- 100    12/19/22 0845 -- -- 92 14 149/87 Lying --    12/19/22 0815 -- -- 86 14 146/61 Lying --    12/19/22 0803 97.9 (36.6) -- 85 12 158/69 Lying 100    12/19/22 0800 97.8 (36.6) Oral 85 22 158/69 Lying 97    12/19/22 0700 -- -- 84 20 174/74 -- 97    12/19/22 0600 -- -- 83 18 160/74 -- 97    12/19/22 0500 -- -- 84 18 151/82 -- 99    12/19/22 0400 97.9 (36.6) Oral 80 16 107/60 Lying 100    12/19/22 0300 -- -- 81 16 119/58 -- 100    12/19/22 0200 -- -- 80 16 105/60 -- 98    12/19/22 0100 -- -- 81 16 109/68 -- 100    12/19/22 0000 98.4 (36.9) Oral 79 14 100/51 Lying 100          Oxygen Therapy (last day)     Date/Time SpO2 Device (Oxygen Therapy) Flow (L/min) Oxygen Concentration (%) ETCO2 (mmHg)    12/20/22 1100 95 nasal cannula 2 -- --    12/20/22 1000 95 nasal cannula 2 -- --    12/20/22 0900 95 nasal cannula 2 -- --    12/20/22 0800 96 nasal cannula 2 -- --    12/20/22 0700 99 nasal cannula 2 -- --    12/20/22 0600 100 -- -- -- --    12/20/22 0500 100 -- -- -- --    12/20/22 0400 100 -- -- -- --    12/20/22 0300 100 nasal cannula 2 -- --    12/20/22 0200 100 -- -- -- --    12/20/22 0100 99 -- -- -- --    12/20/22 0000 100 nasal cannula 2 -- --    12/19/22 2300 98 -- -- -- --    12/19/22 2200 99 -- -- -- --    12/19/22 2100 100 -- -- -- --    12/19/22 2000 98 nasal cannula 2 -- --    12/19/22 1900 98 -- -- -- --    12/19/22 1800 98 -- -- -- --    12/19/22 1700 100 -- -- -- --    12/19/22 1600 99 -- -- -- --    12/19/22 1500 98 -- -- -- --    12/19/22 1400 99 -- -- -- --    12/19/22 1300 99 -- -- -- --    12/19/22 1100 97 -- -- -- --    12/19/22 1000 100 -- -- -- --    12/19/22 0900 100 -- -- -- --    12/19/22 0803 100 -- -- -- --    12/19/22 0800 97 NPPV/NIV -- -- --    12/19/22 0700 97 NPPV/NIV -- -- --    12/19/22 0600 97 -- -- -- --    12/19/22 0500 99 -- -- -- --    12/19/22 0400 100 NPPV/NIV -- -- --     12/19/22 0300 100 -- -- -- --    12/19/22 0200 98 -- -- -- --    12/19/22 0100 100 -- -- -- --    12/19/22 0000 100 NPPV/NIV -- -- --          Current Facility-Administered Medications   Medication Dose Route Frequency Provider Last Rate Last Admin   • acetaminophen (TYLENOL) tablet 650 mg  650 mg Oral Q4H PRN Huy White MD       • atorvastatin (LIPITOR) tablet 20 mg  20 mg Oral Nightly Huy White MD   20 mg at 12/19/22 2046   • calcium carbonate (TUMS) chewable tablet 500 mg (200 mg elemental)  2 tablet Oral BID PRN Huy White MD       • carvedilol (COREG) tablet 6.25 mg  6.25 mg Oral BID With Meals Ace Lauren MD   6.25 mg at 12/20/22 0839   • cefepime (MAXIPIME) 1 g/100 mL 0.9% NS IVPB (mbp)  1 g Intravenous Q24H Huy White MD   1 g at 12/19/22 1819   • dextrose (D50W) (25 g/50 mL) IV injection 25 g  25 g Intravenous Q15 Min PRN Huy White MD       • dextrose (GLUTOSE) oral gel 15 g  15 g Oral Q15 Min PRN Huy White MD       • docusate sodium (COLACE) capsule 100 mg  100 mg Oral BID Jef Ramires MD   100 mg at 12/20/22 0840   • DULoxetine (CYMBALTA) DR capsule 20 mg  20 mg Oral Daily Huy White MD   20 mg at 12/20/22 0838   • furosemide (LASIX) tablet 80 mg  80 mg Oral Once per day on Sun Tue Thu Sat Ace Lauren MD   80 mg at 12/20/22 0838   • gabapentin (NEURONTIN) capsule 300 mg  300 mg Oral Once per day on Mon Wed Fri Huy White MD        And   • gabapentin (NEURONTIN) capsule 300 mg  300 mg Oral 2 times per day on Sun Tue Thu Sat Huy White MD   300 mg at 12/20/22 0839   • glucagon (human recombinant) (GLUCAGEN DIAGNOSTIC) injection 1 mg  1 mg Intramuscular Q15 Min PRN Huy White MD       • heparin (porcine) 5000 UNIT/ML injection 5,000 Units  5,000 Units Subcutaneous Q8H Huy White MD   5,000 Units at 12/20/22 0615   • heparin (porcine) injection 4,000  Units  4,000 Units Intracatheter PRN Huy White MD   4,000 Units at 12/17/22 1608   • HYDROmorphone (DILAUDID) injection 0.5 mg  0.5 mg Intravenous Q2H PRN Huy White MD   0.5 mg at 12/19/22 1428   • hydrOXYzine (ATARAX) tablet 25 mg  25 mg Oral Q8H PRN Huy White MD       • Insulin Aspart (novoLOG) injection 0-9 Units  0-9 Units Subcutaneous TID AC Huy White MD   6 Units at 12/20/22 1117   • ipratropium-albuterol (DUO-NEB) nebulizer solution 3 mL  3 mL Nebulization 4x Daily PRN Huy White MD       • LORazepam (ATIVAN) tablet 0.5 mg  0.5 mg Oral Q8H PRN Huy White MD   0.5 mg at 12/17/22 1918   • losartan (COZAAR) tablet 25 mg  25 mg Oral Daily Ace Lauren MD   25 mg at 12/20/22 0840   • memantine (NAMENDA) tablet 5 mg  5 mg Oral BID Huy White MD   5 mg at 12/20/22 0838   • metroNIDAZOLE (FLAGYL) IVPB 500 mg  500 mg Intravenous Q8H Huy White MD   500 mg at 12/20/22 1125   • O2 (OXYGEN)  2 L/min Inhalation Continuous Huy White MD       • ondansetron (ZOFRAN) tablet 4 mg  4 mg Oral Q6H PRN Huy White MD        Or   • ondansetron (ZOFRAN) injection 4 mg  4 mg Intravenous Q6H PRN Huy White MD   4 mg at 12/17/22 0754   • oxyCODONE (ROXICODONE) immediate release tablet 10 mg  10 mg Oral Q4H PRN Huy White MD   10 mg at 12/20/22 0838   • pantoprazole (PROTONIX) EC tablet 40 mg  40 mg Oral Nightly Huy White MD   40 mg at 12/19/22 2046   • Pharmacy to dose vancomycin   Does not apply Continuous PRN Huy White MD       • sevelamer (RENVELA) tablet 800 mg  800 mg Oral TID With Meals Huy White MD   800 mg at 12/20/22 1124   • sodium chloride 0.9 % flush 10 mL  10 mL Intravenous Q12H Huy White MD   10 mL at 12/20/22 0840   • sodium chloride 0.9 % flush 10 mL  10 mL Intravenous PRN Huy White MD   10  mL at 22 0855   • sodium chloride 0.9 % infusion 1,000 mL  1,000 mL Intravenous Continuous Huy White MD       • sodium chloride 0.9 % infusion 40 mL  40 mL Intravenous PRN Huy White MD       • temazepam (RESTORIL) capsule 15 mg  15 mg Oral Nightly PRN Huy White MD            Physician Progress Notes (last 48 hours)      Ace Lauren MD at 22 0855            [facility]  [address] [place] [address]. [address]  T - [phone]  F - [phone]     Progress Note          PATIENT  DEMOGRAPHICS   PATIENT NAME: Yamileth Slater                      PHYSICIAN: Ashley Coker MD  : 1960  MRN: [id number]   LOS: 5 days    Patient Care Team:  Kiersten Wilson APRN as PCP - General (Family Medicine)  Subjective    SUBJECTIVE   Patient remains  confused and  restrained because she tries to pull on lines.          Objective    OBJECTIVE   Vital Signs  Temp:  [97 °F (36.1 °C)-98.5 °F (36.9 °C)] 98.1 °F (36.7 °C)  Heart Rate:  [] 91  Resp:  [14-22] 22  BP: ()/(53-84) 133/82    Flowsheet Rows    Flowsheet Row First Filed Value   Admission Height 157.5 cm (62\") Documented at 12/15/2022 1700   Admission Weight 124 kg (273 lb 4.8 oz) Documented at 12/15/2022 1700           I/O last 3 completed shifts:  In: 784 [P.O.:270; I.V.:14; IV Piggyback:500]  Out: 3300 [Urine:1000; Other:2300]    PHYSICAL EXAM    Physical Exam  Vitals reviewed.   Constitutional:       Appearance: Normal appearance.   HENT:      Head: Normocephalic and atraumatic.      Right Ear: Tympanic membrane normal.      Left Ear: Tympanic membrane normal.      Nose: Nose normal.      Mouth/Throat:      Mouth: Mucous membranes are moist.   Eyes:      Pupils: Pupils are equal, round, and reactive to light.   Cardiovascular:      Rate and Rhythm: Normal rate.      Pulses: Normal pulses.      Heart sounds: Normal heart sounds.   Pulmonary:      Effort: Pulmonary effort is  normal.      Breath sounds: Normal breath sounds.   Abdominal:      General: Abdomen is flat. Bowel sounds are normal.      Palpations: Abdomen is soft.   Musculoskeletal:         General: Normal range of motion.      Cervical back: Normal range of motion and neck supple.      Comments: Left BKA   Skin:     General: Skin is warm and dry.      Capillary Refill: Capillary refill takes less than 2 seconds.   Neurological:      General: No focal deficit present.      Mental Status: She is alert.   Psychiatric:         Mood and Affect: Mood normal.         RESULTS   Results Review:    Results from last 7 days   Lab Units 12/20/22  0522 12/19/22  0600 12/18/22  0549   SODIUM mmol/L 135* 133* 132*   POTASSIUM mmol/L 5.0 4.3 4.6   CHLORIDE mmol/L 92* 95* 91*   CO2 mmol/L 22.0 22.0 22.0   BUN mg/dL 47* 57* 39*   CREATININE mg/dL 4.43* 4.98* 4.19*   CALCIUM mg/dL 10.0 9.9 10.1   BILIRUBIN mg/dL 0.4 0.4 0.5   ALK PHOS U/L 241* 252* 306*   ALT (SGPT) U/L <5 <5 6   AST (SGOT) U/L 11 16 18   GLUCOSE mg/dL 244* 201* 172*       Estimated Creatinine Clearance: 14.8 mL/min (A) (by C-G formula based on SCr of 4.43 mg/dL (H)).    Results from last 7 days   Lab Units 12/16/22  0259   MAGNESIUM mg/dL 1.9   PHOSPHORUS mg/dL 8.9*             Results from last 7 days   Lab Units 12/20/22  0522 12/19/22  0600 12/18/22  0825 12/18/22  0549 12/17/22  0458   WBC 10*3/mm3 13.52* 9.77 10.68 10.48 9.36   HEMOGLOBIN g/dL 13.1 12.3 12.7 12.8 11.0*   PLATELETS 10*3/mm3 281 259 253 261 219               Imaging Results (Last 24 Hours)     ** No results found for the last 24 hours. **           MEDICATIONS    atorvastatin, 20 mg, Oral, Nightly  carvedilol, 6.25 mg, Oral, BID With Meals  cefepime, 1 g, Intravenous, Q24H  docusate sodium, 100 mg, Oral, BID  DULoxetine, 20 mg, Oral, Daily  furosemide, 80 mg, Oral, Once per day on Sun Tue Thu Sat  gabapentin, 300 mg, Oral, Once per day on Mon Wed Fri   And  gabapentin, 300 mg, Oral, 2 times per day on Sun  Tumadeleine u Sat  heparin (porcine), 5,000 Units, Subcutaneous, Q8H  Insulin Aspart, 0-9 Units, Subcutaneous, TID AC  losartan, 25 mg, Oral, Daily  memantine, 5 mg, Oral, BID  metroNIDAZOLE, 500 mg, Intravenous, Q8H  pantoprazole, 40 mg, Oral, Nightly  sevelamer, 800 mg, Oral, TID With Meals  sodium chloride, 10 mL, Intravenous, Q12H      O2, 2 L/min  Pharmacy to dose vancomycin,   sodium chloride, 1,000 mL        Assessment & Plan   ASSESSMENT / PLAN      Venous insufficiency    Coronary artery disease involving native coronary artery of native heart without angina pectoris    Morbid obesity with BMI of 50.0-59.9, adult (HCC)    Carotid artery disease (HCC)    PAD (peripheral artery disease) (HCC)    Pulmonary hypertension (HCC)    MIKE and COPD overlap syndrome (HCC)    ESRD on hemodialysis (HCC)    Left foot pain    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  She is currently dialyzing at [facility]. her current access is tunneled catheter.  She has prior right upper extremity failed AV fistula. She has recurrent hyperkalemia and had hd Friday and saturday. k remains stable today at 5. HD planned for tomorrow  Losartan lowered due to high k     keep diuretic non dialysis days (makes reasonable urine and for kaliuresis)     2- anemia chronic kidney disease patient has history of B12 deficiency and AV malformation.  hemoglobin is more than 10.  Epogen has been discontinued      3.diabetes mellitus type 2 with nonhealing wound on the left foot and now  left BKA     4. CAD     5. ckd mbd on sevelamer in out patient setting     6. htn stable on coreg              This document has been electronically signed by Ashley Coker MD on December 20, 2022 08:55 CST      I have reviewed the case with the resident. I have also examined and seen  the patient.  I agree with the assessment and plan as documented in the  resident's note.         This document has been electronically signed by Ace Lauren MD on 2022 10:40 CST                 Electronically signed by Ace Lauren MD at 22 1040     Huy White MD at 22 0737          ORTHOPEDIC PROGRESS NOTE:    Name:  Yamileth Slater  Date:    2022  Date of admission:  12/15/2022    Post op day:  4 Days Post-Op  Procedure:    Procedure(s) (LRB):  AMPUTATION BELOW KNEE, LEFT (Left)    Subjective:  No fever or chills  Still with altered mental status      Vitals:     Vitals:    22 0700   BP: 133/82   Pulse: 91   Resp: 22   Temp:    SpO2: 99%      Temp (24hrs), Av.9 °F (36.6 °C), Min:97 °F (36.1 °C), Max:98.5 °F (36.9 °C)      Exam:    Combative at times per nursing.  Still requiring restraints.  Dressing clean and dry (dressing change per nursing reports wound is C/D/I, no erythema)    Results from last 7 days   Lab Units 22  0522 22  0600 22  0549   CRP mg/dL 10.06* 12.95* 16.43*         ASSESSMENT:  Active Hospital Problems    Diagnosis  POA   • **Venous insufficiency [I87.2]  Yes   • Left foot pain [M79.672]  Yes     Added automatically from request for surgery [id number]     • 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 ([facility]) [L97.524]  Yes     Added automatically from request for surgery [id number]     • On home oxygen therapy [Z99.81]  Not Applicable     Added automatically from request for surgery [id number]     • ESRD on hemodialysis ([facility]) [N18.6, Z99.2]  Not Applicable     -Receives hemodialysis MWF at [facility] in [place]  Missed dialysis per family prior to arrival   Nephrology consulted appreciated recs          • MIKE and COPD overlap syndrome ([facility]) [G47.33, J44.9]  Yes     -Home Trilogy/CPAP continue   -Home 3L oxygen dependent. Maintain strict Sats between 88-92%       • Pulmonary hypertension ([facility]) [I27.20]  Yes   • Coronary artery disease involving native coronary artery of native heart  without angina pectoris [I25.10]  Yes     - S/P CABG, Bilateral carotid artery stenosis w/ 2 stents on left side,  PAD (peripheral artery disease) with stent in right upper leg  - Cardiology on board       • Morbid obesity with BMI of 50.0-59.9, adult ([facility]) [E66.01, Z68.43]  Not Applicable     Body mass index is 56.52 kg/m².  - Consistent carb, cardiac, renal diet     • Carotid artery disease ([facility]) [I77.9]  Yes     -Status post stenting  -Continue home medications     • PAD (peripheral artery disease) ([facility]) [I73.9]  Yes     · Continue ASA 81mg, Clopidogrel 75mg, Atorvastatin 40mg           PLAN:    S/p BKA  Continue dressing changes.  Still with altered mental status per the sister.  Continue medical management  DVT prophylaxis  Mobility as tolerated    12/20/22 at 07:37 CST by Huy White MD        Electronically signed by Huy White MD at 12/20/22 3428     Jef Ramires MD at 12/19/22 3813              [facility] Medicine Services  INPATIENT PROGRESS NOTE    Length of Stay: 4  Date of Admission: 12/15/2022  Primary Care Physician: Kiersten Wilson APRN    Subjective   Chief Complaint: Left foot pain secondary to necrosis  HPI: Patient remains confused.  Will make eye contact with me, but will not speak.    As of today, 12/19/22  Review of Systems   Unable to perform ROS: Mental status change        All pertinent negatives and positives are as above. All other systems have been reviewed and are negative unless otherwise stated.    Objective    Temp:  [97 °F (36.1 °C)-99 °F (37.2 °C)] 98.5 °F (36.9 °C)  Heart Rate:  [] 97  Resp:  [12-22] 15  BP: ()/(51-87) 124/84    AM-PAC 6 Clicks Score (PT): 6 (12/19/22 0800)    As of today, 12/19/22  Physical Exam  Vitals reviewed.   Constitutional:       Appearance: She is well-developed.   HENT:      Head: Normocephalic and atraumatic.   Eyes:      Pupils: Pupils are equal, round, and  reactive to light.   Cardiovascular:      Rate and Rhythm: Normal rate and regular rhythm.      Heart sounds: Normal heart sounds. No murmur heard.    No friction rub. No gallop.   Pulmonary:      Effort: Pulmonary effort is normal. No respiratory distress.      Breath sounds: Normal breath sounds. No wheezing or rales.   Chest:      Chest wall: No tenderness.   Abdominal:      General: Bowel sounds are normal. There is no distension.      Palpations: Abdomen is soft.      Tenderness: There is no abdominal tenderness. There is no guarding.   Musculoskeletal:      Cervical back: Normal range of motion and neck supple.      Comments: Left BKA.  Dressing clean dry and intact.   Skin:     General: Skin is warm and dry.   Neurological:      Mental Status: She is alert.      Comments: No meaningful interaction   Psychiatric:      Comments: Makes eye contact but does not speak           Results Review:  I have reviewed the labs, radiology results, and diagnostic studies.    Laboratory Data:   Results from last 7 days   Lab Units 12/19/22  0600 12/18/22  0549 12/17/22  0458   SODIUM mmol/L 133* 132* 134*   POTASSIUM mmol/L 4.3 4.6 6.0*   CHLORIDE mmol/L 95* 91* 97*   CO2 mmol/L 22.0 22.0 21.0*   BUN mg/dL 57* 39* 45*   CREATININE mg/dL 4.98* 4.19* 4.68*   GLUCOSE mg/dL 201* 172* 180*   CALCIUM mg/dL 9.9 10.1 9.4   BILIRUBIN mg/dL 0.4 0.5 0.5   ALK PHOS U/L 252* 306* 307*   ALT (SGPT) U/L <5 6 13   AST (SGOT) U/L 16 18 18   ANION GAP mmol/L 16.0* 19.0* 16.0*     Estimated Creatinine Clearance: 13.9 mL/min (A) (by C-G formula based on SCr of 4.98 mg/dL (H)).  Results from last 7 days   Lab Units 12/16/22  0259   MAGNESIUM mg/dL 1.9   PHOSPHORUS mg/dL 8.9*         Results from last 7 days   Lab Units 12/19/22  0600 12/18/22  0825 12/18/22  0549 12/17/22  0458 12/16/22  0259   WBC 10*3/mm3 9.77 10.68 10.48 9.36 9.27   HEMOGLOBIN g/dL 12.3 12.7 12.8 11.0* 10.5*   HEMATOCRIT % 38.7 41.4 42.2 36.6 33.0*   PLATELETS 10*3/mm3 259  253 261 219 213           Culture Data:   No results found for: BLOODCX  No results found for: URINECX  No results found for: RESPCX  No results found for: WOUNDCX  No results found for: STOOLCX  No components found for: BODYFLD    Radiology Data:   Imaging Results (Last 24 Hours)     ** No results found for the last 24 hours. **          I have reviewed the patient's current medications.     Assessment/Plan     Active Hospital Problems    Diagnosis    • **Venous insufficiency    • Left foot pain    • 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 ([facility])    • On home oxygen therapy    • ESRD on hemodialysis ([facility])    • MIKE and COPD overlap syndrome ([facility])    • Pulmonary hypertension ([facility])    • Coronary artery disease involving native coronary artery of native heart without angina pectoris    • Morbid obesity with BMI of 50.0-59.9, adult ([facility])    • Carotid artery disease ([facility])    • PAD (peripheral artery disease) ([facility])        Plan:  1.  Left foot necrosis: Status post BKA.  Continue antibiotics.  2.  End-stage renal disease: Continue hemodialysis.  Appreciate nephrology help.  3.  Diabetes mellitus: Continue current.  4.  Hypertension: Continue losartan.  5.  Altered was: Likely anesthesia reaction.  Patient sister states that this is common for her.  6.  Chronic pain: Continue current pain management for now.  7.  DVT prophylaxis: Heparin    To floor today.    The patient was evaluated during the global COVID-19 pandemic, and the diagnosis was suspected/considered upon their initial presentation.  Evaluation, treatment, and testing were consistent with current guidelines for patients who present with complaints or symptoms that may be related to COVID-19.    I confirmed that the patient's Advance Care Plan is present, code status is documented, or surrogate decision maker is listed in the patient's medical record.       Discharge Planning: I expect patient to be discharged to skilled facility in 4-5 days.        This  document has been electronically signed by Jef Ramires MD on 2022 16:55 CST        Electronically signed by Jef Ramires MD at 22 1708     Ace Lauren MD at 22 0836            [facility]  [address] [place] [address]. [address]  T - [phone]  F - [phone]     Progress Note          PATIENT  DEMOGRAPHICS   PATIENT NAME: Yamileth Slater                      PHYSICIAN: Ace Lauren MD  : 1960  MRN: [id number]   LOS: 4 days    Patient Care Team:  Kiersten Wilson APRN as PCP - General (Family Medicine)  Subjective    SUBJECTIVE   C/o pain at surgical site. Venous chamber clotted.        Objective    OBJECTIVE   Vital Signs  Temp:  [97.8 °F (36.6 °C)-99 °F (37.2 °C)] 97.9 °F (36.6 °C)  Heart Rate:  [79-99] 99  Resp:  [-] 16  BP: (100-174)/(51-87) 110/72    Flowsheet Rows    Flowsheet Row First Filed Value   Admission Height 157.5 cm (62\") Documented at 12/15/2022 1700   Admission Weight 124 kg (273 lb 4.8 oz) Documented at 12/15/2022 1700           I/O last 3 completed shifts:  In: 1505 [P.O.:540; I.V.:765; IV Piggyback:200]  Out: 1450 [Urine:1450]    PHYSICAL EXAM    Physical Exam  Constitutional:       Appearance: She is well-developed.   HENT:      Head: Normocephalic.   Eyes:      Pupils: Pupils are equal, round, and reactive to light.   Cardiovascular:      Rate and Rhythm: Normal rate and regular rhythm.      Heart sounds: Normal heart sounds.   Pulmonary:      Effort: Pulmonary effort is normal.      Breath sounds: Normal breath sounds.   Abdominal:      General: Bowel sounds are normal.      Palpations: Abdomen is soft.   Musculoskeletal:      Comments: Left bka    Skin:     Coloration: Skin is not jaundiced.   Neurological:      General: No focal deficit present.      Mental Status: She is alert and oriented to person, place, and time.         RESULTS   Results Review:    Results from last 7 days   Lab Units 22  0600  12/18/22  0549 12/17/22  0458   SODIUM mmol/L 133* 132* 134*   POTASSIUM mmol/L 4.3 4.6 6.0*   CHLORIDE mmol/L 95* 91* 97*   CO2 mmol/L 22.0 22.0 21.0*   BUN mg/dL 57* 39* 45*   CREATININE mg/dL 4.98* 4.19* 4.68*   CALCIUM mg/dL 9.9 10.1 9.4   BILIRUBIN mg/dL 0.4 0.5 0.5   ALK PHOS U/L 252* 306* 307*   ALT (SGPT) U/L <5 6 13   AST (SGOT) U/L 16 18 18   GLUCOSE mg/dL 201* 172* 180*       Estimated Creatinine Clearance: 13.9 mL/min (A) (by C-G formula based on SCr of 4.98 mg/dL (H)).    Results from last 7 days   Lab Units 12/16/22  0259   MAGNESIUM mg/dL 1.9   PHOSPHORUS mg/dL 8.9*             Results from last 7 days   Lab Units 12/19/22  0600 12/18/22  0825 12/18/22  0549 12/17/22  0458 12/16/22  0259   WBC 10*3/mm3 9.77 10.68 10.48 9.36 9.27   HEMOGLOBIN g/dL 12.3 12.7 12.8 11.0* 10.5*   PLATELETS 10*3/mm3 259 253 261 219 213               Imaging Results (Last 24 Hours)     ** No results found for the last 24 hours. **           MEDICATIONS    !Vancomycin Level Draw Needed, , Does not apply, Once  atorvastatin, 20 mg, Oral, Nightly  carvedilol, 6.25 mg, Oral, BID With Meals  cefepime, 1 g, Intravenous, Q24H  DULoxetine, 20 mg, Oral, Daily  epoetin hubert/hubert-epbx, 4,000 Units, Intravenous, Once per day on Mon Wed Fri  furosemide, 80 mg, Oral, Once per day on Sun Tue Thu Sat  gabapentin, 300 mg, Oral, Once per day on Mon Wed Fri   And  gabapentin, 300 mg, Oral, 2 times per day on Sun Tue Thu Sat  heparin (porcine), 5,000 Units, Subcutaneous, Q8H  Insulin Aspart, 0-9 Units, Subcutaneous, TID AC  losartan, 25 mg, Oral, Daily  memantine, 5 mg, Oral, BID  metroNIDAZOLE, 500 mg, Intravenous, Q8H  pantoprazole, 40 mg, Oral, Nightly  sevelamer, 800 mg, Oral, TID With Meals  sodium chloride, 10 mL, Intravenous, Q12H      O2, 2 L/min  Pharmacy to dose vancomycin,   sodium chloride, 1,000 mL        Assessment & Plan   ASSESSMENT / PLAN      Venous insufficiency    Morbid obesity with BMI of 50.0-59.9, adult (HCC)    Coronary  artery disease involving native coronary artery of native heart without angina pectoris    Carotid artery disease (HCC)    PAD (peripheral artery disease) (HCC)    Pulmonary hypertension (HCC)    MIKE and COPD overlap syndrome (HCC)    ESRD on hemodialysis (HCC)    Left foot pain    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  She is currently dialyzing at [facility]. her current access is tunneled catheter.  She has prior right upper extremity failed AV fistula. She has recurrent hyperkalemia and had hd Friday and saturday. k stable today and 2 k bath. Losartan lowered due to high k    keep diuretic non dialysis days (makes reasonable urine and for kaliuresis)     2- anemia chronic kidney disease patient has history of B12 deficiency and AV malformation.  hemoglobin is more than 10. Will stop epogen     3.diabetes mellitus type 2 with nonhealing wound on the left foot and now  left BKA     4. CAD     5. ckd mbd on sevelamer in out patient setting     6. htn stable on coreg      Copied text in this note has been reviewed and is accurate as of 12/19/2022.                  This document has been electronically signed by Ace Lauren MD on December 19, 2022 10:16 CST           Electronically signed by Ace Lauren MD at 12/19/22 1020       Medical Student Notes (last 24 hours)  Notes from 12/19/22 1146 through 12/20/22 1146   No notes of this type exist for this encounter.         Consult Notes (last 24 hours)  Notes from 12/19/22 1146 through 12/20/22 1146   No notes of this type exist for this encounter.

## 2022-12-20 NOTE — PLAN OF CARE
Goal Outcome Evaluation:      Status: AMS, restrained for pulling on lines, requires further dialysis, still waiting for Pediatry consult for Right foot.      Plan: transfer to [facility], continue antibiotic therapy, repeat dialysis, continue to reorient.

## 2022-12-20 NOTE — THERAPY EVALUATION
Patient Name: Yamileth Slater  : 1960    MRN: [id number]                              Today's Date: 2022       Admit Date: 12/15/2022    Visit Dx:     ICD-10-CM ICD-9-CM   1. Venous insufficiency  I87.2 459.81   2. Pulmonary hypertension ([facility])  I27.20 416.8   3. Left foot pain  M79.672 729.5   4. PAD (peripheral artery disease) ([facility])  I73.9 443.9   5. On home oxygen therapy  Z99.81 V46.2   6. CKD (chronic kidney disease) requiring chronic dialysis ([facility])  N18.6 585.6    Z99.2 V45.11   7. Bilateral carotid artery stenosis  I65.23 433.10     433.30   8. Morbid obesity with BMI of 45.0-49.9, adult ([facility])  E66.01 278.01    Z68.42 V85.42   9. 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 ([facility])  L97.524 707.15     730.17   10. Coronary artery disease involving native coronary artery of native heart without angina pectoris  I25.10 414.01   11. MIKE and COPD overlap syndrome ([facility])  G47.33 327.23    J44.9 496   12. Impaired physical mobility  Z74.09 781.99   13. Impaired mobility and ADLs  Z74.09 V49.89    Z78.9      Patient Active Problem List   Diagnosis   • Chronic midline low back pain without sciatica   • Vitamin D deficiency   • Tobacco dependence syndrome   • Shoulder joint pain   • Morbid obesity with BMI of 50.0-59.9, adult ([facility])   • Mixed hyperlipidemia   • Kidney stone   • History of colon polyps   • GERD (gastroesophageal reflux disease)   • Excoriated eczema   • Hypertension   • COPD (chronic obstructive pulmonary disease) ([facility])   • Chronic folliculitis   • Coronary artery disease involving native coronary artery of native heart without angina pectoris   • 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   • Hypothyroidism   • Carotid artery disease ([facility])   • [place] abuse   • PAD (peripheral artery disease) ([facility])   • Venous insufficiency   • LAFB (left anterior fascicular block)   • Pulmonary hypertension ([facility])   • Left hip pain   • Neck pain   • MIKE and COPD overlap syndrome ([facility])    • Pneumonia due to COVID-19 virus   • Hyperkalemia   • Cutaneous candidiasis   • Community acquired pneumonia of right middle lobe of lung   • MRSA infection   • Esophageal dysphagia   • Monilial esophagitis ([facility])   • NSTEMI, initial episode of care ([facility])   • Cellulitis of foot associated with diabetes mellitus ([facility])   • Urinary tract infection due to Proteus   • History of insertion of tunneled central venous catheter (CVC) with port   • Anemia due to chronic kidney disease, on chronic dialysis ([facility])   • Pneumonitis   • Personal history of noncompliance with medical treatment, presenting hazards to health   • Type 2 diabetes mellitus with circulatory disorder ([facility])   • Physical deconditioning   • ESRD on hemodialysis ([facility])   • DVT prophylaxis   • Anemia   • Ventilator associated pneumonia ([facility])   • Positive fecal occult blood test   • Yeast UTI   • Gangrene of both feet ([facility])   • Left foot pain   • 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 ([facility])   • On home oxygen therapy     Past Medical History:   Diagnosis Date   • Acute blood loss anemia 04/16/2017    Likely due to gastric oozing at this time. - Dr. Duarte (GI) was consulted and has now signed off, will follow up outpatient - pill colonoscopy showed AVMs - continue to monitor   • Acute cystitis with hematuria 03/31/2021 1/13: IV Rocephin 1 gm q 24 1/14 : transitioned  to omnicef 300mg. Urine cultures resulted and did not show growth. Omnicef discontinued as patient is asymptomatic   • Altered mental status 01/09/2022    - AMS on presentation - initial ABG pH 7.3, CO2 34 - Procal 0.29 - UA negative for acute cysitits -CTA head wnl  - Empiric Zosyn and Vancomycin -Lactate 2.5 on admission  - blood cultures no growth at 24 hours     • Anxiety    • CAD (coronary artery disease) 04/24/2021    S/P 3 stents 5/1/2021 for [facility] Continue ASA 81mg & Clopidogrel 75mg Continue Atorvastatin 40mg   • Carotid artery stenosis    • CHF (congestive heart failure) ([facility])     • Chronic obstructive lung disease (HCC)    • CKD (chronic kidney disease) stage 4, GFR 15-29 ml/min ([facility])    • CKD (chronic kidney disease), symptom management only, stage 5 ([facility]) 10/05/2020    Results from last 7 days Lab Units 12/15/21 0548 12/14/21 1323 12/14/21 0916 CREATININE mg/dL 3.92* 3.21* 3.32*  Baseline creatinine 2-3 GFR 13-25 GFR 15 Dialysis MWF, sees Dr. Lauren Nephrology consult,, appreciate recommendations Continue Bumex 1mg bid daily Holding Bumex 2mg 4 times a week   • Colonic polyp    • Coronary arteriosclerosis    • Diabetes mellitus ([facility])    • Diabetic neuropathy ([facility])    • Ear pain, right 10/18/2021    - canal trauma due to patient scratching and DMT2 - added cortisporin ear drops   • Elevated troponin 10/12/2021    -most likely from CKD -Trending down -Neg chest pain   • Generalized abdominal pain 07/01/2022    Could be due to initiation of tube feeds vs dyspepsia vs abdominal cramps related to no PO intake due to intubation vs constipation Continue current laxative regiment  If no bowel movement by this afternoon will consider enema   • GERD (gastroesophageal reflux disease)    • GI bleed 05/13/2021    - GI will follow up outpatient - Protonix 40mg daily - Avoid medical DVT prophy and use mechanical at this time instead. - Continue to monitor - pill colonoscopy results showed AVMs   • History of transfusion    • Hypercholesterolemia    • Hyperosmolar hyperglycemic state (HHS) ([facility]) 06/25/2022    Serum glucose 605 on admission  Anion gap 16 PH 7.37 Bicarb 27.9 Continue fluids  Insulin drip with HHS protocol  Anion gap closed around 10 AM, received one dose of Levamir subq, will stop insulin drip after 2 hrs     • Hypertension    • Hypokalemia 05/27/2022    Will replace as needed. Will be cautious in the setting of ESRD to avoid need for emergency dialysis   Monitor Qtc intervals on EKG     • Hypomagnesemia 06/27/2021    Monitor and replace   • Morbid obesity ([facility])    • Nephrolithiasis    •  On mechanically assisted ventilation (HCC) 06/26/2022    Vent management and sedation orders placed.  - Critical access hospital intensivist group consulted for vent management appreciate recommendations  - plan to extubate today     • Peripheral vascular disease ([facility])    • Pleural effusion on right 06/26/2022    CXR on 6/30/22 read as a small upper left pulmonary edema vs early pneumonia.  Last Echo 1/2022 EF 61-65 % Continue to monitor  Procal slightly improved, CRP improved On Linezolid and meropenum      • PVD (peripheral vascular disease) ([facility])    • SIRS (systemic inflammatory response syndrome) ([facility]) 01/09/2022    Admission  - WBC 17.78   -   - RR 16 - 1/10: VSS/wnl - CXR - Mild pulm edema - Blood cultures no growth at 24 hours  - Procalcitonin 0.29 - UA : glucose 1000, negative Leucocytes/nitrate - Empiric Zosyn and Vancomcyin    • Sleep apnea    • Substance abuse ([facility])    • Vitamin D deficiency      Past Surgical History:   Procedure Laterality Date   • CARDIAC CATHETERIZATION N/A 07/14/2020   • CARDIAC CATHETERIZATION N/A 04/23/2021    Procedure: Left Heart Cath;  Surgeon: Melba Romo MD;  Location: [facility] CATH INVASIVE LOCATION;  Service: Cardiology;  Laterality: N/A;   • CARDIAC CATHETERIZATION N/A 04/30/2021    Procedure: Percutaneous Coronary Intervention;  Surgeon: Russell Voss MD;  Location: [facility] CATH INVASIVE LOCATION;  Service: Cardiovascular;  Laterality: N/A;   • CARDIAC CATHETERIZATION N/A 04/30/2021    Procedure: Stent NIKKI coronary;  Surgeon: Russell Voss MD;  Location: [facility] CATH INVASIVE LOCATION;  Service: Cardiovascular;  Laterality: N/A;   • CARDIAC CATHETERIZATION Left 11/13/2021    Procedure: Left Heart Cath;  Surgeon: Niall Rios MD;  Location: [facility] CATH INVASIVE LOCATION;  Service: Cardiology;  Laterality: Left;   • CAROTID STENT Left    • COLONOSCOPY     • COLONOSCOPY N/A 05/14/2021    Procedure: COLONOSCOPY;  Surgeon: Mingo Duarte MD;   Location: [facility] ENDOSCOPY;  Service: Gastroenterology;  Laterality: N/A;   • CORONARY ARTERY BYPASS GRAFT N/A 2013    CABG X 3   • CYSTOSCOPY BLADDER STONE LITHOTRIPSY Bilateral    • ENDOSCOPY N/A 04/12/2021    Procedure: ESOPHAGOGASTRODUODENOSCOPY;  Surgeon: Mingo Duarte MD;  Location: [facility] ENDOSCOPY;  Service: Gastroenterology;  Laterality: N/A;   • ENDOSCOPY N/A 05/14/2021    Procedure: ESOPHAGOGASTRODUODENOSCOPY;  Surgeon: Mingo Duarte MD;  Location: [facility] ENDOSCOPY;  Service: Gastroenterology;  Laterality: N/A;   • ENDOSCOPY N/A 01/28/2022    Procedure: ESOPHAGOGASTRODUODENOSCOPY;  Surgeon: Migno Duarte MD;  Location: [facility] ENDOSCOPY;  Service: Gastroenterology;  Laterality: N/A;   • HYSTERECTOMY     • INTERVENTIONAL RADIOLOGY PROCEDURE N/A 10/21/2021    Procedure: tunneled central venous catheter placement;  Surgeon: Donnie Robles MD;  Location: [facility] ANGIO INVASIVE LOCATION;  Service: Interventional Radiology;  Laterality: N/A;   • INTERVENTIONAL RADIOLOGY PROCEDURE N/A 01/27/2022    Procedure: tunneled central venous catheter placement;  Surgeon: Donnie Robles MD;  Location: [facility] ANGIO INVASIVE LOCATION;  Service: Interventional Radiology;  Laterality: N/A;   • LAPAROSCOPIC TUBAL LIGATION     • TUNNELED VENOUS CATHETER PLACEMENT        General Information     Row Name 12/20/22 0825          OT Time and Intention    Document Type evaluation  -SA     Mode of Treatment individual therapy;occupational therapy  -     Row Name 12/20/22 0825          General Information    Patient Profile Reviewed yes  -SA     Prior Level of Function independent:;feeding;grooming;dressing;bathing;transfer;w/c or scooter  -SA     Existing Precautions/Restrictions fall  -SA     Barriers to Rehab medically complex  -SA     Row Name 12/20/22 0825          Living Environment    People in Home sibling(s)  -SA     Row Name 12/20/22 0825          Stairs Within Home, Primary    Stairs Comment,  Within Home, Primary Pt unable to give PLOF. Per PT note \"Information from sister who lives with patient;pt inconsistent in ability to answer questions. Per sister since L foot surgery in September, pt able to propel her w/c and transfer by stand pivot w/c to toilet to w/c. Sister provided SBA while pt transferred onto shower chair in tubshower; Pt able to bathe herself. Pt lives in handicapped accessible apartment.\"  -     Row Name 12/20/22 0825          Cognition    Orientation Status (Cognition) oriented to;person  -     Row Name 12/20/22 0825          Safety Issues, Functional Mobility    Safety Issues Affecting Function (Mobility) ability to follow commands;at risk behavior observed;awareness of need for assistance;friction/shear risk;insight into deficits/self-awareness  -     Impairments Affecting Function (Mobility) endurance/activity tolerance;pain;strength;range of motion (ROM);grasp;postural/trunk control  -           User Key  (r) = Recorded By, (t) = Taken By, (c) = Cosigned By    Initials Name Provider Type    Deanna Davis OT Occupational Therapist                 Mobility/ADL's     Row Name 12/20/22 0825          Bed Mobility    Bed Mobility rolling right  -     Rolling Right [place] (Bed Mobility) verbal cues;maximum assist (25% patient effort)  -     Comment, (Bed Mobility) EOB/OOB ax not tested due to increased pain with movement in LE and pt difficulty following directions.  -     Row Name 12/20/22 0825          Bed-Chair Transfer    Bed-Chair [place] (Transfers) not tested  -     Comment, (Bed-Chair Transfer) Did place bed in chair position for ~20 minutes to complete feeding  -     Row Name 12/20/22 08          Sit-Stand Transfer    Sit-Stand [place] (Transfers) unable to assess  -     Row Name 12/20/22 0825          Activities of Daily Living    BADL Assessment/Intervention feeding  -     Row Name 12/20/22 0825          Self-Feeding  Assessment/Training    [place] Level (Feeding) finger foods;liquids to mouth;prepare tray/open items;scoop food and bring to mouth;minimum assist (75% patient effort)  -     Position (Self-Feeding) sitting up in bed  -           User Key  (r) = Recorded By, (t) = Taken By, (c) = Cosigned By    Initials Name Provider Type    Deanna Davis OT Occupational Therapist               Obj/Interventions     Row Name 12/20/22 0825          Sensory Assessment (Somatosensory)    Sensory Assessment (Somatosensory) unable/difficult to assess  -     Row Name 12/20/22 0825          Range of Motion Comprehensive    General Range of Motion other (see comments)  -     Comment, General Range of Motion BUE AAROM/PROM WFL.  -     Row Name 12/20/22 0825          Strength Comprehensive (MMT)    General Manual Muscle Testing (MMT) Assessment other (see comments)  -     Comment, General Manual Muscle Testing (MMT) Assessment Pt displayed difficulty following instructions for BUE MMT. BUE grossly 4-/5.  -           User Key  (r) = Recorded By, (t) = Taken By, (c) = Cosigned By    Initials Name Provider Type     Deanna Quiles OT Occupational Therapist               Goals/Plan     Row Name 12/20/22 0825          Transfer Goal 1 (OT)    Activity/Assistive Device (Transfer Goal 1, OT) bed-to-chair/chair-to-bed;commode, bedside without drop arms;commode, bedside with drop arms;walker, rolling  -     [place] Level/Cues Needed (Transfer Goal 1, OT) minimum assist (75% or more patient effort)  -     Time Frame (Transfer Goal 1, OT) long term goal (LTG);by discharge  -     Progress/Outcome (Transfer Goal 1, OT) goal not met  -     Row Name 12/20/22 0825          Bathing Goal 1 (OT)    Activity/Device (Bathing Goal 1, OT) upper body bathing  -     [place] Level/Cues Needed (Bathing Goal 1, OT) standby assist  -     Time Frame (Bathing Goal 1, OT) long term goal (LTG);by discharge  -SA      Progress/Outcomes (Bathing Goal 1, OT) goal not met  -     Row Name 12/20/22 0825          Dressing Goal 1 (OT)    Activity/Device (Dressing Goal 1, OT) upper body dressing  -SA     [place]/Cues Needed (Dressing Goal 1, OT) standby assist  -SA     Time Frame (Dressing Goal 1, OT) long term goal (LTG);by discharge  -     Progress/Outcome (Dressing Goal 1, OT) goal not met  -     Row Name 12/20/22 0825          Grooming Goal 1 (OT)    Activity/Device (Grooming Goal 1, OT) grooming skills, all  -SA     [place] (Grooming Goal 1, OT) standby assist  -SA     Time Frame (Grooming Goal 1, OT) long term goal (LTG);by discharge  -     Progress/Outcome (Grooming Goal 1, OT) goal not met  -     Row Name 12/20/22 0825          Self-Feeding Goal 1 (OT)    Activity/Device (Self-Feeding Goal 1, OT) self-feeding skills, all  -SA     [place] Level/Cues Needed (Self-Feeding Goal 1, OT) independent  -SA     Time Frame (Self-Feeding Goal 1, OT) long term goal (LTG);by discharge  -     Progress/Outcomes (Self-Feeding Goal 1, OT) goal not met  -     Row Name 12/20/22 0825          Therapy Assessment/Plan (OT)    Planned Therapy Interventions (OT) activity tolerance training;manual therapy/joint mobilization;patient/caregiver education/training;adaptive equipment training;BADL retraining;neuromuscular control/coordination retraining;prosthetic fitting/training;ROM/therapeutic exercise;cognitive/visual perception retraining;edema control/reduction;occupation/activity based interventions;strengthening exercise;functional balance retraining;IADL retraining;passive ROM/stretching;transfer/mobility retraining;wheelchair assessment/training  -           User Key  (r) = Recorded By, (t) = Taken By, (c) = Cosigned By    Initials Name Provider Type    Deanna Davis OT Occupational Therapist               Clinical Impression     Row Name 12/20/22 0825          Pain Assessment    Pain Intervention(s) Medication  (See MAR);Repositioned;Rest;Nursing Notified  -     Row Name 12/20/22 0825          Pain Scale: FACES Pre/Post-Treatment    Pain: FACES Scale, Pretreatment 6-->hurts even more  -     Pain Location - Side/Orientation Left  -SA     Pain Location lower  -SA     Pain Location - residual limb  -     Pre/Posttreatment Pain Comment Pt does not give pain rating when asked. Pt does cry out in pain with movement  -     Row Name 12/20/22 0825          Plan of Care Review    Plan of Care Reviewed With patient  -     Outcome Evaluation OT eval completed. Patient able to state name and answer yes or no questions. Pt stated that her LLE hurts one time. Pt did not state pain rating. Patient required max A to roll in bed. Unable to complete sup<>sit due to c/o pain and decreased safety awareness when given directions. Pt may benefit from co-tx. Pt bed placed in chair position for ~20 minutes to complete feeding. Min A required to use dominant L hand to open packages, scoop food and bring to mouth, and obtain drink and bring to mouth. Restraints doffed for ROM, MMT, and feeding and donned prior to exit. Cont skilled IP/OT to address decreased strength, ROM, ax tolerance, and safety/independence with ADLs, IADLs, and transfers. Recommended additional rehab prior to returning home:  IRF vs SNF rehab to home vs LTACH pending progress with inpatient therapy.  -     Row Name 12/20/22 0825          Therapy Assessment/Plan (OT)    Patient/Family Therapy Goal Statement (OT) did not state  -     Rehab Potential (OT) good, to achieve stated therapy goals  -     Criteria for Skilled Therapeutic Interventions Met (OT) yes;meets criteria;skilled treatment is necessary  -     Therapy Frequency (OT) daily  -     Predicted Duration of Therapy Intervention (OT) until all goals met or d/c from hospital  -     Row Name 12/20/22 0825          Vital Signs    Pre Systolic BP Rehab 150  -SA     Pre Treatment Diastolic BP 68  -SA      Post Systolic BP Rehab 160  -SA     Post Treatment Diastolic BP 83  -SA     Pretreatment Heart Rate (beats/min) 85  -SA     Posttreatment Heart Rate (beats/min) 83  -SA     Pre SpO2 (%) 97  -SA     O2 Delivery Pre Treatment room air  -SA     Post SpO2 (%) 95  -SA     O2 Delivery Post Treatment room air  -SA     Pre Patient Position Supine  -SA     Post Patient Position Supine  -SA     Row Name 12/20/22 0825          Positioning and Restraints    Pre-Treatment Position in bed  -SA     Post Treatment Position bed  -SA     In Bed encouraged to call for assist;call light within reach;supine;patient within staff view  -SA     Restraints reapplied:  -SA           User Key  (r) = Recorded By, (t) = Taken By, (c) = Cosigned By    Initials Name Provider Type    Deanna Davis OT Occupational Therapist               Outcome Measures     Row Name 12/20/22 08          How much help from another is currently needed...    Putting on and taking off regular lower body clothing? 1  -SA     Bathing (including washing, rinsing, and drying) 1  -SA     Toileting (which includes using toilet bed pan or urinal) 1  -SA     Putting on and taking off regular upper body clothing 1  -SA     Taking care of personal grooming (such as brushing teeth) 2  -SA     Eating meals 2  -SA     AM-PAC 6 Clicks Score (OT) 8  -SA     Row Name 12/20/22 0825          Functional Assessment    Outcome Measure Options AM-PAC 6 Clicks Daily Activity (OT)  -SA           User Key  (r) = Recorded By, (t) = Taken By, (c) = Cosigned By    Initials Name Provider Type    Deanna Davis OT Occupational Therapist                Occupational Therapy Education     Title: PT OT SLP Therapies (In Progress)     Topic: Occupational Therapy (In Progress)     Point: ADL training (In Progress)     Description:   Instruct learner(s) on proper safety adaptation and remediation techniques during self care or transfers.   Instruct in proper use of assistive devices.               Learning Progress Summary           Patient Acceptance, E,TB, NR by  at 12/20/2022 0919    Comment: OT POC, Role of OT, transfer training                   Point: Home exercise program (Not Started)     Description:   Instruct learner(s) on appropriate technique for monitoring, assisting and/or progressing therapeutic exercises/activities.              Learner Progress:  Not documented in this visit.          Point: Precautions (In Progress)     Description:   Instruct learner(s) on prescribed precautions during self-care and functional transfers.              Learning Progress Summary           Patient Acceptance, E,TB, NR by  at 12/20/2022 0919    Comment: OT POC, Role of OT, transfer training                   Point: Body mechanics (In Progress)     Description:   Instruct learner(s) on proper positioning and spine alignment during self-care, functional mobility activities and/or exercises.              Learning Progress Summary           Patient Acceptance, E,TB, NR by  at 12/20/2022 0919    Comment: OT POC, Role of OT, transfer training                               User Key     Initials Effective Dates Name Provider Type Discipline     08/11/22 -  Deanna Quiles OT Occupational Therapist OT              OT Recommendation and Plan  Planned Therapy Interventions (OT): activity tolerance training, manual therapy/joint mobilization, patient/caregiver education/training, adaptive equipment training, BADL retraining, neuromuscular control/coordination retraining, prosthetic fitting/training, ROM/therapeutic exercise, cognitive/visual perception retraining, edema control/reduction, occupation/activity based interventions, strengthening exercise, functional balance retraining, IADL retraining, passive ROM/stretching, transfer/mobility retraining, wheelchair assessment/training  Therapy Frequency (OT): daily  Plan of Care Review  Plan of Care Reviewed With: patient  Outcome Evaluation: OT eval completed.  Patient able to state name and answer yes or no questions. Pt stated that her LLE hurts one time. Pt did not state pain rating. Patient required max A to roll in bed. Unable to complete sup<>sit due to c/o pain and decreased safety awareness when given directions. Pt may benefit from co-tx. Pt bed placed in chair position for ~20 minutes to complete feeding. Min A required to use dominant L hand to open packages, scoop food and bring to mouth, and obtain drink and bring to mouth. Restraints doffed for ROM, MMT, and feeding and donned prior to exit. Cont skilled IP/OT to address decreased strength, ROM, ax tolerance, and safety/independence with ADLs, IADLs, and transfers. Recommended additional rehab prior to returning home:  IRF vs SNF rehab to home vs LTACH pending progress with inpatient therapy.     Time Calculation:    Time Calculation- OT     Row Name 12/20/22 0825             Time Calculation- OT    OT Start Time 0825  -SA      OT Stop Time 0921  -SA      OT Time Calculation (min) 56 min  -SA      OT Received On 12/20/22  -SA      OT Goal Re-Cert Due Date 01/02/23  -SA         Untimed Charges    OT Eval/Re-eval Minutes 56  -SA         Total Minutes    Untimed Charges Total Minutes 56  -SA       Total Minutes 56  -SA            User Key  (r) = Recorded By, (t) = Taken By, (c) = Cosigned By    Initials Name Provider Type    Deanna Davis OT Occupational Therapist              Therapy Charges for Today     Code Description Service Date Service Provider Modifiers Qty    [id number] HC OT EVAL HIGH COMPLEXITY 4 12/20/2022 Deanna Quiles OT GO 1               Deanna Quiles OT  12/20/2022

## 2022-12-20 NOTE — PLAN OF CARE
Goal Outcome Evaluation:  Plan of Care Reviewed With: patient           Outcome Evaluation: OT eval completed. Patient able to state name and answer yes or no questions. Pt stated that her LLE hurts one time. Pt did not state pain rating. Patient required max A to roll in bed. Unable to complete sup<>sit due to c/o pain and decreased safety awareness when given directions. Pt may benefit from co-tx. Pt bed placed in chair position for ~20 minutes to complete feeding. Min A required to use dominant L hand to open packages, scoop food and bring to mouth, and obtain drink and bring to mouth. Restraints doffed for ROM, MMT, and feeding and donned prior to exit. Cont skilled IP/OT to address decreased strength, ROM, ax tolerance, and safety/independence with ADLs, IADLs, and transfers. Recommended additional rehab prior to returning home:  IRF vs SNF rehab to home vs LTACH pending progress with inpatient therapy.

## 2022-12-20 NOTE — THERAPY TREATMENT NOTE
Acute Care - Physical Therapy Treatment Note  [facility]     Patient Name: Yamileth Slater  : 1960  MRN: [id number]  Today's Date: 2022      Visit Dx:     ICD-10-CM ICD-9-CM   1. Venous insufficiency  I87.2 459.81   2. Pulmonary hypertension ([facility])  I27.20 416.8   3. Left foot pain  M79.672 729.5   4. PAD (peripheral artery disease) ([facility])  I73.9 443.9   5. On home oxygen therapy  Z99.81 V46.2   6. CKD (chronic kidney disease) requiring chronic dialysis ([facility])  N18.6 585.6    Z99.2 V45.11   7. Bilateral carotid artery stenosis  I65.23 433.10     433.30   8. Morbid obesity with BMI of 45.0-49.9, adult ([facility])  E66.01 278.01    Z68.42 V85.42   9. 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 ([facility])  L97.524 707.15     730.17   10. Coronary artery disease involving native coronary artery of native heart without angina pectoris  I25.10 414.01   11. MIKE and COPD overlap syndrome ([facility])  G47.33 327.23    J44.9 496   12. Impaired physical mobility  Z74.09 781.99   13. Impaired mobility and ADLs  Z74.09 V49.89    Z78.9      Patient Active Problem List   Diagnosis   • Chronic midline low back pain without sciatica   • Vitamin D deficiency   • Tobacco dependence syndrome   • Shoulder joint pain   • Morbid obesity with BMI of 50.0-59.9, adult ([facility])   • Mixed hyperlipidemia   • Kidney stone   • History of colon polyps   • GERD (gastroesophageal reflux disease)   • Excoriated eczema   • Hypertension   • COPD (chronic obstructive pulmonary disease) ([facility])   • Chronic folliculitis   • Coronary artery disease involving native coronary artery of native heart without angina pectoris   • 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   • Hypothyroidism   • Carotid artery disease ([facility])   • [place] abuse   • PAD (peripheral artery disease) ([facility])   • Venous insufficiency   • LAFB (left anterior fascicular block)   • Pulmonary hypertension ([facility])   • Left hip pain   • Neck pain   • MIKE and COPD overlap  syndrome ([facility])   • Pneumonia due to COVID-19 virus   • Hyperkalemia   • Cutaneous candidiasis   • Community acquired pneumonia of right middle lobe of lung   • MRSA infection   • Esophageal dysphagia   • Monilial esophagitis ([facility])   • NSTEMI, initial episode of care ([facility])   • Cellulitis of foot associated with diabetes mellitus ([facility])   • Urinary tract infection due to Proteus   • History of insertion of tunneled central venous catheter (CVC) with port   • Anemia due to chronic kidney disease, on chronic dialysis ([facility])   • Pneumonitis   • Personal history of noncompliance with medical treatment, presenting hazards to health   • Type 2 diabetes mellitus with circulatory disorder ([facility])   • Physical deconditioning   • ESRD on hemodialysis ([facility])   • DVT prophylaxis   • Anemia   • Ventilator associated pneumonia ([facility])   • Positive fecal occult blood test   • Yeast UTI   • Gangrene of both feet ([facility])   • Left foot pain   • 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 ([facility])   • On home oxygen therapy     Past Medical History:   Diagnosis Date   • Acute blood loss anemia 04/16/2017    Likely due to gastric oozing at this time. - Dr. Duarte (GI) was consulted and has now signed off, will follow up outpatient - pill colonoscopy showed AVMs - continue to monitor   • Acute cystitis with hematuria 03/31/2021 1/13: IV Rocephin 1 gm q 24 1/14 : transitioned  to omnicef 300mg. Urine cultures resulted and did not show growth. Omnicef discontinued as patient is asymptomatic   • Altered mental status 01/09/2022    - AMS on presentation - initial ABG pH 7.3, CO2 34 - Procal 0.29 - UA negative for acute cysitits -CTA head wnl  - Empiric Zosyn and Vancomycin -Lactate 2.5 on admission  - blood cultures no growth at 24 hours     • Anxiety    • CAD (coronary artery disease) 04/24/2021    S/P 3 stents 5/1/2021 for [facility] Continue ASA 81mg & Clopidogrel 75mg Continue Atorvastatin 40mg   • Carotid artery stenosis    • CHF (congestive heart  failure) ([facility])    • Chronic obstructive lung disease ([facility])    • CKD (chronic kidney disease) stage 4, GFR 15-29 ml/min ([facility])    • CKD (chronic kidney disease), symptom management only, stage 5 ([facility]) 10/05/2020    Results from last 7 days Lab Units 12/15/21 0548 12/14/21 1323 12/14/21 0916 CREATININE mg/dL 3.92* 3.21* 3.32*  Baseline creatinine 2-3 GFR 13-25 GFR 15 Dialysis MWF, sees Dr. Lauren Nephrology consult,, appreciate recommendations Continue Bumex 1mg bid daily Holding Bumex 2mg 4 times a week   • Colonic polyp    • Coronary arteriosclerosis    • Diabetes mellitus ([facility])    • Diabetic neuropathy ([facility])    • Ear pain, right 10/18/2021    - canal trauma due to patient scratching and DMT2 - added cortisporin ear drops   • Elevated troponin 10/12/2021    -most likely from CKD -Trending down -Neg chest pain   • Generalized abdominal pain 07/01/2022    Could be due to initiation of tube feeds vs dyspepsia vs abdominal cramps related to no PO intake due to intubation vs constipation Continue current laxative regiment  If no bowel movement by this afternoon will consider enema   • GERD (gastroesophageal reflux disease)    • GI bleed 05/13/2021    - GI will follow up outpatient - Protonix 40mg daily - Avoid medical DVT prophy and use mechanical at this time instead. - Continue to monitor - pill colonoscopy results showed AVMs   • History of transfusion    • Hypercholesterolemia    • Hyperosmolar hyperglycemic state (HHS) ([facility]) 06/25/2022    Serum glucose 605 on admission  Anion gap 16 PH 7.37 Bicarb 27.9 Continue fluids  Insulin drip with HHS protocol  Anion gap closed around 10 AM, received one dose of Levamir subq, will stop insulin drip after 2 hrs     • Hypertension    • Hypokalemia 05/27/2022    Will replace as needed. Will be cautious in the setting of ESRD to avoid need for emergency dialysis   Monitor Qtc intervals on EKG     • Hypomagnesemia 06/27/2021    Monitor and replace   • Morbid obesity ([facility])    •  Nephrolithiasis    • On mechanically assisted ventilation ([facility]) 06/26/2022    Vent management and sedation orders placed.  - [facility] intensivist group consulted for vent management appreciate recommendations  - plan to extubate today     • Peripheral vascular disease ([facility])    • Pleural effusion on right 06/26/2022    CXR on 6/30/22 read as a small upper left pulmonary edema vs early pneumonia.  Last Echo 1/2022 EF 61-65 % Continue to monitor  Procal slightly improved, CRP improved On Linezolid and meropenum      • PVD (peripheral vascular disease) ([facility])    • SIRS (systemic inflammatory response syndrome) ([facility]) 01/09/2022    Admission  - WBC 17.78   -   - RR 16 - 1/10: VSS/wnl - CXR - Mild pulm edema - Blood cultures no growth at 24 hours  - Procalcitonin 0.29 - UA : glucose 1000, negative Leucocytes/nitrate - Empiric Zosyn and Vancomcyin    • Sleep apnea    • Substance abuse ([facility])    • Vitamin D deficiency      Past Surgical History:   Procedure Laterality Date   • CARDIAC CATHETERIZATION N/A 07/14/2020   • CARDIAC CATHETERIZATION N/A 04/23/2021    Procedure: Left Heart Cath;  Surgeon: Melba Romo MD;  Location: [facility] CATH INVASIVE LOCATION;  Service: Cardiology;  Laterality: N/A;   • CARDIAC CATHETERIZATION N/A 04/30/2021    Procedure: Percutaneous Coronary Intervention;  Surgeon: Russell Voss MD;  Location: [facility] CATH INVASIVE LOCATION;  Service: Cardiovascular;  Laterality: N/A;   • CARDIAC CATHETERIZATION N/A 04/30/2021    Procedure: Stent NIKKI coronary;  Surgeon: Russell Voss MD;  Location: [facility] CATH INVASIVE LOCATION;  Service: Cardiovascular;  Laterality: N/A;   • CARDIAC CATHETERIZATION Left 11/13/2021    Procedure: Left Heart Cath;  Surgeon: Niall Rios MD;  Location: [facility] CATH INVASIVE LOCATION;  Service: Cardiology;  Laterality: Left;   • CAROTID STENT Left    • COLONOSCOPY     • COLONOSCOPY N/A 05/14/2021    Procedure: COLONOSCOPY;  Surgeon: Felicia  MD Mingo;  Location: [facility] ENDOSCOPY;  Service: Gastroenterology;  Laterality: N/A;   • CORONARY ARTERY BYPASS GRAFT N/A 2013    CABG X 3   • CYSTOSCOPY BLADDER STONE LITHOTRIPSY Bilateral    • ENDOSCOPY N/A 04/12/2021    Procedure: ESOPHAGOGASTRODUODENOSCOPY;  Surgeon: [place] Duarte MD;  Location: [facility] ENDOSCOPY;  Service: Gastroenterology;  Laterality: N/A;   • ENDOSCOPY N/A 05/14/2021    Procedure: ESOPHAGOGASTRODUODENOSCOPY;  Surgeon: Mingo Duarte MD;  Location: [facility] ENDOSCOPY;  Service: Gastroenterology;  Laterality: N/A;   • ENDOSCOPY N/A 01/28/2022    Procedure: ESOPHAGOGASTRODUODENOSCOPY;  Surgeon: M[place] Duarte MD;  Location: [facility] ENDOSCOPY;  Service: Gastroenterology;  Laterality: N/A;   • HYSTERECTOMY     • INTERVENTIONAL RADIOLOGY PROCEDURE N/A 10/21/2021    Procedure: tunneled central venous catheter placement;  Surgeon: Donnie Robles MD;  Location: [facility] ANGIO INVASIVE LOCATION;  Service: Interventional Radiology;  Laterality: N/A;   • INTERVENTIONAL RADIOLOGY PROCEDURE N/A 01/27/2022    Procedure: tunneled central venous catheter placement;  Surgeon: Donnie Robles MD;  Location: [facility] ANGIO INVASIVE LOCATION;  Service: Interventional Radiology;  Laterality: N/A;   • LAPAROSCOPIC TUBAL LIGATION     • TUNNELED VENOUS CATHETER PLACEMENT       PT Assessment (last 12 hours)     PT Evaluation and Treatment     Row Name 12/20/22 1300          Physical Therapy Time and Intention    Document Type therapy note (daily note)  -HENRIQUE     Mode of Treatment individual therapy;physical therapy  -HENRIQUE     Patient Effort good  -HENRIQUE     Comment pt in restraints. nsg approved LE ther ex.  -HENRIQUE     Row Name 12/20/22 1300          General Information    Patient Profile Reviewed yes  -HENRIQUE     Existing Precautions/Restrictions fall  -HENRIQUE     Row Name 12/20/22 1300          Cognition    Orientation Status (Cognition) oriented to;person;place;situation;disoriented to;time  -HENRIQUE      Personal Safety Interventions fall prevention program maintained;gait belt;muscle strengthening facilitated;nonskid shoes/slippers when out of bed;supervised activity  -[facility] 12/20/22 1300          Bed Mobility    Bed Mobility rolling right  -     Rolling Right [place] (Bed Mobility) verbal cues;maximum assist (25% patient effort)  -HENRIQUE     Row Name 12/20/22 1300          Bed-Chair Transfer    Bed-Chair [place] (Transfers) not tested  -HENRIQUE     Row Name 12/20/22 1300          Sit-Stand Transfer    Sit-Stand [place] (Transfers) unable to assess  -[facility] 12/20/22 1300          Gait/Stairs (Locomotion)    [place] Level (Gait) not tested  -HENRIQUE     Row Name 12/20/22 1300          Safety Issues, Functional Mobility    Impairments Affecting Function (Mobility) endurance/activity tolerance;pain;strength;range of motion (ROM)  -HENRIQUE     Row Name 12/20/22 1300          Motor Skills    Therapeutic Exercise --  ankle DF/PF, hip Ab/Ad, HS, SAQ- 10x1 R LE. HS S, calf S- 3x30 sec R LE. SLR, hip Ab/Ad, knee flexion/ext- 10x1 L LE  -     Additional Documentation --  pt having much pain with movement of L LE.  -[facility]             Wound 08/09/22 1021 Right anterior great toe Incision    Wound - Properties Group Placement Date: 08/09/22  -SC Placement Time: 1021  -SC Side: Right  -SC Orientation: anterior  -SC Location: great toe  -SC Primary Wound Type: Incision  -MG    Retired Wound - Properties Group Placement Date: 08/09/22  -SC Placement Time: 1021  -SC Side: Right  -SC Orientation: anterior  -SC Location: great toe  -SC Primary Wound Type: Incision  -MG    Retired Wound - Properties Group Date first assessed: 08/09/22  -SC Time first assessed: 1021  -SC Side: Right  -SC Location: great toe  -SC Primary Wound Type: Incision  -MG    Row Name             Wound 12/15/22 1609 Left posterior    Wound - Properties Group Placement Date: 12/15/22  -LR Placement Time: 1609  -LR Side: Left  -LR  Orientation: posterior  -LR    Retired Wound - Properties Group Placement Date: 12/15/22  -LR Placement Time: 1609  -LR Side: Left  -LR Orientation: posterior  -LR    Retired Wound - Properties Group Date first assessed: 12/15/22  -LR Time first assessed: 1609  -LR Side: Left  -LR    Row Name             Wound 12/15/22 1652 coccyx    Wound - Properties Group Placement Date: 12/15/22  -LR Placement Time: 1652  -LR Location: coccyx  -LR    Retired Wound - Properties Group Placement Date: 12/15/22  -LR Placement Time: 1652  -LR Location: coccyx  -LR    Retired Wound - Properties Group Date first assessed: 12/15/22  -LR Time first assessed: 1652  -LR Location: coccyx  -LR    Row Name             Wound 12/15/22 1728 Left anterior    Wound - Properties Group Placement Date: 12/15/22  -LR Placement Time: 1728  -LR Side: Left  -LR Orientation: anterior  -LR    Retired Wound - Properties Group Placement Date: 12/15/22  -LR Placement Time: 1728  -LR Side: Left  -LR Orientation: anterior  -LR    Retired Wound - Properties Group Date first assessed: 12/15/22  -LR Time first assessed: 1728  -LR Side: Left  -LR    Row Name             Wound 09/06/22 2356 Right anterior second toe Traumatic    Wound - Properties Group Placement Date: 09/06/22  -RL Placement Time: 2356  -RL Side: Right  -RL Orientation: anterior  -RL Location: second toe  -RL Primary Wound Type: Traumatic  -MG    Retired Wound - Properties Group Placement Date: 09/06/22  -RL Placement Time: 2356  -RL Side: Right  -RL Orientation: anterior  -RL Location: second toe  -RL Primary Wound Type: Traumatic  -MG    Retired Wound - Properties Group Date first assessed: 09/06/22  -RL Time first assessed: 2356  -RL Side: Right  -RL Location: second toe  -RL Primary Wound Type: Traumatic  -MG    Row Name             Wound 12/16/22 1647 Left lower leg Incision    Wound - Properties Group Placement Date: 12/16/22  -MJ Placement Time: 1647  -MJ Present on Hospital Admission: N   -MJ Side: Left  -MJ Orientation: lower  -MJ Location: leg  -MJ Primary Wound Type: Incision  -MJ    Retired Wound - Properties Group Placement Date: 12/16/22  -MJ Placement Time: 1647  -MJ Present on Hospital Admission: N  -MJ Side: Left  -MJ Orientation: lower  -MJ Location: leg  -MJ Primary Wound Type: Incision  -MJ    Retired Wound - Properties Group Date first assessed: 12/16/22  -MJ Time first assessed: 1647  -MJ Present on Hospital Admission: N  -MJ Side: Left  -MJ Location: leg  -MJ Primary Wound Type: Incision  -MJ    Row Name 12/20/22 1300          Vital Signs    Pre Systolic BP Rehab 131  -HENRIQUE     Pre Treatment Diastolic BP 61  -HENRIQUE     Post Systolic BP Rehab 114  -HENRIQUE     Post Treatment Diastolic BP 70  -HENRIQUE     Pretreatment Heart Rate (beats/min) 87  -HENRIQUE     Posttreatment Heart Rate (beats/min) 86  -HENRIQUE     Pre SpO2 (%) 98  -HENRIQUE     O2 Delivery Pre Treatment room air  -HENRIQUE     Post SpO2 (%) 99  -HENRIQUE     O2 Delivery Post Treatment room air  -HENRIQUE     Pre Patient Position Supine  -     Post Patient Position Supine  -     Row Name 12/20/22 1300          Positioning and Restraints    Pre-Treatment Position in bed  -HENRIQUE     Post Treatment Position bed  -HENRIQUE     In Bed fowlers;call light within reach;encouraged to call for assist;exit alarm on  all needs met  -     Row Name 12/20/22 1300          Therapy Assessment/Plan (PT)    Rehab Potential (PT) good, to achieve stated therapy goals  -     Criteria for Skilled Interventions Met (PT) yes;meets criteria;skilled treatment is necessary  -     Therapy Frequency (PT) daily  x2 if tolerated  -     Row Name 12/20/22 1300          Bed Mobility Goal 1 (PT)    Activity/Assistive Device (Bed Mobility Goal 1, PT) sit to supine;supine to sit  -     [place] Level/Cues Needed (Bed Mobility Goal 1, PT) minimum assist (75% or more patient effort);moderate assist (50-74% patient effort)  -     Time Frame (Bed Mobility Goal 1, PT) by discharge  -      Strategies/Barriers (Bed Mobility Goal 1, PT) co-morbidities  -     Progress/Outcomes (Bed Mobility Goal 1, PT) goal not met  -     Row Name 12/20/22 1300          Transfer Goal 1 (PT)    Activity/Assistive Device (Transfer Goal 1, PT) sit-to-stand/stand-to-sit;bed-to-chair/chair-to-bed;wheelchair transfer  -     [place] Level/Cues Needed (Transfer Goal 1, PT) minimum assist (75% or more patient effort);moderate assist (50-74% patient effort)  -     Time Frame (Transfer Goal 1, PT) 2 weeks  -     Progress/Outcome (Transfer Goal 1, PT) goal not met  -     Row Name 12/20/22 1300          ROM Goal 1 (PT)    ROM Goal 1 (PT) Pt will tolerate bed in chair position 30 minutes progressing to OOB in chair with VSS  -HENRIQUE     Time Frame (ROM Goal 1, PT) 2 weeks  -     Progress/Outcome (ROM Goal 1, PT) goal not met  -     Row Name 12/20/22 1300          Problem Specific Goal 1 (PT)    Problem Specific Goal 1 (PT) Pt will propel w/c 50ft or more with SBA with VSS  -     Time Frame (Problem Specific Goal 1, PT) 2 weeks;3 weeks  -     Progress/Outcome (Problem Specific Goal 1, PT) goal not met  -           User Key  (r) = Recorded By, (t) = Taken By, (c) = Cosigned By    Initials Name Provider Type    Sharon Franklin, RN Registered Nurse    Tracy Shaw RN Registered Nurse    Oliver Mejía, PTA Physical Therapist Assistant    Lisha Zhao RN Registered Nurse    Rabia Carty RN Registered Nurse    Milly Landin, RN Registered Nurse                Physical Therapy Education     Title: PT OT SLP Therapies (In Progress)     Topic: Physical Therapy (In Progress)     Point: Mobility training (In Progress)     Learning Progress Summary           Patient Acceptance, E, NR by  at 12/20/2022 1322                   Point: Home exercise program (Not Started)     Learner Progress:  Not documented in this visit.          Point: Body mechanics (Not Started)     Learner Progress:   Not documented in this visit.          Point: Precautions (Not Started)     Learner Progress:  Not documented in this visit.                      User Key     Initials Effective Dates Name Provider Type Discipline     06/16/21 -  Oliver Bennett PTA Physical Therapist Assistant PT              PT Recommendation and Plan  Anticipated Discharge Disposition (PT): LTCH (long-term care [facility]), inpatient rehabilitation facility, extended care facility  Therapy Frequency (PT): daily (x2 if tolerated)  Plan of Care Reviewed With: patient  Progress: improving  Outcome Evaluation: nsg approves LE ther ex. pt in restraints but seems to be following commands. pt participated in LE ther ex, AAROM. pt having much pain in L LE with movement. no new goals met at this time. pt would continue to benefit from PT services.   Outcome Measures     Row Name 12/20/22 1300             How much help from another person do you currently need...    Turning from your back to your side while in flat bed without using bedrails? 2  -HENRIQUE      Moving from lying on back to sitting on the side of a flat bed without bedrails? 2  -HENRIQUE      Moving to and from a bed to a chair (including a wheelchair)? 1  -HENRIQUE      Standing up from a chair using your arms (e.g., wheelchair, bedside chair)? 1  -HENRIQUE      Climbing 3-5 steps with a railing? 1  -HENRIQUE      To walk in hospital room? 1  -HENRIQUE      AM-PAC 6 Clicks Score (PT) 8  -         Functional Assessment    Outcome Measure Options AM-PAC 6 Clicks Basic Mobility (PT)  -            User Key  (r) = Recorded By, (t) = Taken By, (c) = Cosigned By    Initials Name Provider Type    HENRIQUE Oliver Bennett PTA Physical Therapist Assistant                 Time Calculation:    PT Charges     Row Name 12/20/22 1325             Time Calculation    Start Time 1300  -      Stop Time 1324  -      Time Calculation (min) 24 min  -         Time Calculation- PT    Total Timed Code Minutes- PT 24 minute(s)  -          Timed Charges    52511 - PT Therapeutic Exercise Minutes 24  -HENRIQUE         Total Minutes    Timed Charges Total Minutes 24  -HENRIQUE       Total Minutes 24  -HENRIQUE            User Key  (r) = Recorded By, (t) = Taken By, (c) = Cosigned By    Initials Name Provider Type    Oliver Mejía PTA Physical Therapist Assistant              Therapy Charges for Today     Code Description Service Date Service Provider Modifiers Qty    81258301089 HC PT THER PROC EA 15 MIN 12/20/2022 Oliver Bennett PTA GP 2          PT G-Codes  Outcome Measure Options: AM-PAC 6 Clicks Basic Mobility (PT)  AM-PAC 6 Clicks Score (PT): 8  AM-PAC 6 Clicks Score (OT): 8    Oliver Bennett PTA  12/20/2022

## 2022-12-21 ENCOUNTER — HOME CARE VISIT (OUTPATIENT)
Dept: HOME HEALTH SERVICES | Facility: HOME HEALTHCARE | Age: 62
End: 2022-12-21

## 2022-12-21 LAB
ALBUMIN SERPL-MCNC: 3.2 G/DL (ref 3.5–5.2)
ALBUMIN/GLOB SERPL: 0.6 G/DL
ALP SERPL-CCNC: 203 U/L (ref 39–117)
ALT SERPL W P-5'-P-CCNC: <5 U/L (ref 1–33)
ANION GAP SERPL CALCULATED.3IONS-SCNC: 20 MMOL/L (ref 5–15)
ANISOCYTOSIS BLD QL: ABNORMAL
AST SERPL-CCNC: 9 U/L (ref 1–32)
BASOPHILS # BLD MANUAL: 0.13 10*3/MM3 (ref 0–0.2)
BASOPHILS NFR BLD MANUAL: 1 % (ref 0–1.5)
BILIRUB SERPL-MCNC: 0.3 MG/DL (ref 0–1.2)
BUN SERPL-MCNC: 68 MG/DL (ref 8–23)
BUN/CREAT SERPL: 11.2 (ref 7–25)
CALCIUM SPEC-SCNC: 9.8 MG/DL (ref 8.6–10.5)
CHLORIDE SERPL-SCNC: 91 MMOL/L (ref 98–107)
CO2 SERPL-SCNC: 21 MMOL/L (ref 22–29)
CREAT SERPL-MCNC: 6.07 MG/DL (ref 0.57–1)
CRP SERPL-MCNC: 7.81 MG/DL (ref 0–0.5)
DEPRECATED RDW RBC AUTO: 50.3 FL (ref 37–54)
EGFRCR SERPLBLD CKD-EPI 2021: 7.3 ML/MIN/1.73
EOSINOPHIL # BLD MANUAL: 0.51 10*3/MM3 (ref 0–0.4)
EOSINOPHIL NFR BLD MANUAL: 4 % (ref 0.3–6.2)
ERYTHROCYTE [DISTWIDTH] IN BLOOD BY AUTOMATED COUNT: 16.2 % (ref 12.3–15.4)
GLOBULIN UR ELPH-MCNC: 5.2 GM/DL
GLUCOSE BLDC GLUCOMTR-MCNC: 258 MG/DL (ref 70–130)
GLUCOSE BLDC GLUCOMTR-MCNC: 264 MG/DL (ref 70–130)
GLUCOSE BLDC GLUCOMTR-MCNC: 332 MG/DL (ref 70–130)
GLUCOSE BLDC GLUCOMTR-MCNC: 342 MG/DL (ref 70–130)
GLUCOSE SERPL-MCNC: 270 MG/DL (ref 65–99)
HCT VFR BLD AUTO: 40.3 % (ref 34–46.6)
HGB BLD-MCNC: 12.5 G/DL (ref 12–15.9)
LYMPHOCYTES # BLD MANUAL: 2.81 10*3/MM3 (ref 0.7–3.1)
LYMPHOCYTES NFR BLD MANUAL: 9 % (ref 5–12)
MCH RBC QN AUTO: 26.8 PG (ref 26.6–33)
MCHC RBC AUTO-ENTMCNC: 31 G/DL (ref 31.5–35.7)
MCV RBC AUTO: 86.3 FL (ref 79–97)
MONOCYTES # BLD: 1.15 10*3/MM3 (ref 0.1–0.9)
NEUTROPHILS # BLD AUTO: 8.19 10*3/MM3 (ref 1.7–7)
NEUTROPHILS NFR BLD MANUAL: 62 % (ref 42.7–76)
NEUTS BAND NFR BLD MANUAL: 2 % (ref 0–5)
PLATELET # BLD AUTO: 251 10*3/MM3 (ref 140–450)
PMV BLD AUTO: 8.5 FL (ref 6–12)
POTASSIUM SERPL-SCNC: 4.5 MMOL/L (ref 3.5–5.2)
PROT SERPL-MCNC: 8.4 G/DL (ref 6–8.5)
RBC # BLD AUTO: 4.67 10*6/MM3 (ref 3.77–5.28)
SMALL PLATELETS BLD QL SMEAR: ADEQUATE
SODIUM SERPL-SCNC: 132 MMOL/L (ref 136–145)
VARIANT LYMPHS NFR BLD MANUAL: 1 % (ref 0–5)
VARIANT LYMPHS NFR BLD MANUAL: 21 % (ref 19.6–45.3)
WBC MORPH BLD: NORMAL
WBC NRBC COR # BLD: 12.79 10*3/MM3 (ref 3.4–10.8)

## 2022-12-21 PROCEDURE — 63710000001 INSULIN ASPART PER 5 UNITS: Performed by: ORTHOPAEDIC SURGERY

## 2022-12-21 PROCEDURE — 80053 COMPREHEN METABOLIC PANEL: CPT | Performed by: ORTHOPAEDIC SURGERY

## 2022-12-21 PROCEDURE — 97530 THERAPEUTIC ACTIVITIES: CPT

## 2022-12-21 PROCEDURE — 25010000002 HEPARIN (PORCINE) PER 1000 UNITS: Performed by: INTERNAL MEDICINE

## 2022-12-21 PROCEDURE — 25010000002 HEPARIN (PORCINE) PER 1000 UNITS: Performed by: ORTHOPAEDIC SURGERY

## 2022-12-21 PROCEDURE — 99024 POSTOP FOLLOW-UP VISIT: CPT | Performed by: ORTHOPAEDIC SURGERY

## 2022-12-21 PROCEDURE — 85027 COMPLETE CBC AUTOMATED: CPT | Performed by: ORTHOPAEDIC SURGERY

## 2022-12-21 PROCEDURE — 25010000002 HYDROMORPHONE 1 MG/ML SOLUTION: Performed by: ORTHOPAEDIC SURGERY

## 2022-12-21 PROCEDURE — 82962 GLUCOSE BLOOD TEST: CPT

## 2022-12-21 PROCEDURE — 25010000002 ONDANSETRON PER 1 MG: Performed by: ORTHOPAEDIC SURGERY

## 2022-12-21 PROCEDURE — 85007 BL SMEAR W/DIFF WBC COUNT: CPT | Performed by: ORTHOPAEDIC SURGERY

## 2022-12-21 PROCEDURE — 97535 SELF CARE MNGMENT TRAINING: CPT

## 2022-12-21 PROCEDURE — 86140 C-REACTIVE PROTEIN: CPT | Performed by: ORTHOPAEDIC SURGERY

## 2022-12-21 RX ORDER — DOCUSATE SODIUM 100 MG/1
200 CAPSULE, LIQUID FILLED ORAL 2 TIMES DAILY
Status: DISCONTINUED | OUTPATIENT
Start: 2022-12-21 | End: 2022-12-23 | Stop reason: HOSPADM

## 2022-12-21 RX ADMIN — DULOXETINE HYDROCHLORIDE 20 MG: 20 CAPSULE, DELAYED RELEASE ORAL at 09:14

## 2022-12-21 RX ADMIN — HEPARIN SODIUM 5000 UNITS: 5000 INJECTION INTRAVENOUS; SUBCUTANEOUS at 15:26

## 2022-12-21 RX ADMIN — MEMANTINE 5 MG: 5 TABLET ORAL at 19:55

## 2022-12-21 RX ADMIN — HEPARIN SODIUM 4000 UNITS: 1000 INJECTION INTRAVENOUS; SUBCUTANEOUS at 10:16

## 2022-12-21 RX ADMIN — INSULIN ASPART 6 UNITS: 100 INJECTION, SOLUTION INTRAVENOUS; SUBCUTANEOUS at 11:39

## 2022-12-21 RX ADMIN — PANTOPRAZOLE SODIUM 40 MG: 40 TABLET, DELAYED RELEASE ORAL at 19:55

## 2022-12-21 RX ADMIN — TEMAZEPAM 15 MG: 15 CAPSULE ORAL at 19:55

## 2022-12-21 RX ADMIN — SEVELAMER CARBONATE 800 MG: 800 TABLET, FILM COATED ORAL at 09:14

## 2022-12-21 RX ADMIN — OXYCODONE HYDROCHLORIDE 10 MG: 10 TABLET ORAL at 10:24

## 2022-12-21 RX ADMIN — GABAPENTIN 300 MG: 300 CAPSULE ORAL at 09:14

## 2022-12-21 RX ADMIN — ONDANSETRON 4 MG: 2 INJECTION INTRAMUSCULAR; INTRAVENOUS at 11:39

## 2022-12-21 RX ADMIN — INSULIN ASPART 7 UNITS: 100 INJECTION, SOLUTION INTRAVENOUS; SUBCUTANEOUS at 17:35

## 2022-12-21 RX ADMIN — SEVELAMER CARBONATE 800 MG: 800 TABLET, FILM COATED ORAL at 17:35

## 2022-12-21 RX ADMIN — OXYCODONE HYDROCHLORIDE 10 MG: 10 TABLET ORAL at 05:48

## 2022-12-21 RX ADMIN — HEPARIN SODIUM 5000 UNITS: 5000 INJECTION INTRAVENOUS; SUBCUTANEOUS at 19:57

## 2022-12-21 RX ADMIN — OXYCODONE HYDROCHLORIDE 10 MG: 10 TABLET ORAL at 19:55

## 2022-12-21 RX ADMIN — HYDROMORPHONE HYDROCHLORIDE 0.5 MG: 1 INJECTION, SOLUTION INTRAMUSCULAR; INTRAVENOUS; SUBCUTANEOUS at 15:32

## 2022-12-21 RX ADMIN — DOCUSATE SODIUM 200 MG: 100 CAPSULE, LIQUID FILLED ORAL at 19:55

## 2022-12-21 RX ADMIN — INSULIN ASPART 4 UNITS: 100 INJECTION, SOLUTION INTRAVENOUS; SUBCUTANEOUS at 09:11

## 2022-12-21 RX ADMIN — Medication 10 ML: at 19:56

## 2022-12-21 RX ADMIN — MEMANTINE 5 MG: 5 TABLET ORAL at 09:14

## 2022-12-21 RX ADMIN — ATORVASTATIN CALCIUM 20 MG: 20 TABLET, FILM COATED ORAL at 19:55

## 2022-12-21 RX ADMIN — CARVEDILOL 6.25 MG: 6.25 TABLET, FILM COATED ORAL at 17:35

## 2022-12-21 RX ADMIN — HEPARIN SODIUM 5000 UNITS: 5000 INJECTION INTRAVENOUS; SUBCUTANEOUS at 05:48

## 2022-12-21 NOTE — DISCHARGE PLACEMENT REQUEST
Yamileth Slater (62 y.o. Female)     Date of Birth   1960    Social Security Number       Address   [address] [facility] [address] [place] [address]    Home Phone   [phone]    MRN   [id number]       Scientology   None    Marital Status   Legally                             Admission Date   12/15/22    Admission Type   Urgent    Admitting Provider   Nestor Richards MD    Attending Provider   Nestor Richards MD    Department, Room/Bed   [address], 412/1       Discharge Date       Discharge Disposition       Discharge Destination                               Attending Provider: Nestor Richards MD    Allergies: Adhesive Tape, Nsaids, Latex, Other, Wound Dressing Adhesive    Isolation: Contact   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), 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)   Code Status: CPR    Ht: 157.5 cm (62.01\")   Wt: 107 kg (235 lb 6.4 oz)    Admission Cmt: None   Principal Problem: Venous insufficiency [I87.2]                 Active Insurance as of 12/15/2022     Primary Coverage     Payor Plan Insurance Group Employer/Plan Group    ANTHEM MEDICARE REPLACEMENT ANTHEM MEDICARE ADVANTAGE [id number]     Payor Plan Address Payor Plan Phone Number Payor Plan Fax Number Effective Dates    [address] [phone]  1/1/2022 - None Entered    [facility] [address]       Subscriber Name Subscriber Birth Date Member ID       YAMILETH SLATER 1960 [id number]           Secondary Coverage     Payor Plan Insurance Group Employer/Plan Group    [place]      Payor Plan Address Payor Plan Phone Number Payor Plan Fax Number Effective Dates    [address] [phone]  6/28/2019 - None Entered    [facility] [id number]       Subscriber Name Subscriber Birth Date Member ID       YAMILETH SLATER 1960 [id number]                 Emergency Contacts      (Rel.) Home Phone Work Phone Mobile Phone    RAMONA KHAN (Sister) -- -- [phone]     Yamileth Chavez (Daughter) [phone] -- [phone]    Suzanne Rivera (Daughter) [phone] -- [phone]

## 2022-12-21 NOTE — PLAN OF CARE
Goal Outcome Evaluation:  Plan of Care Reviewed With: patient        Progress: improving  Outcome Evaluation: Pt agrees to OT this am. Pt's breakfast tray arrived and is Min A for setting tray up and CGA for self feeding and drinking tasks. Pt to have dialysis this morning and OT tx ended. Continue OT POC

## 2022-12-21 NOTE — PLAN OF CARE
Goal Outcome Evaluation:  Plan of Care Reviewed With: patient     Problem: Adult Inpatient Plan of Care  Goal: Plan of Care Review  Outcome: Ongoing, Progressing  Flowsheets (Taken 12/21/2022 1321)  Progress: improving  Plan of Care Reviewed With: patient  Outcome Evaluation: pt agreeable to PT. pt is uncomfrtable and wanting to move. pt requires min A to roll R/L. mod A x2 for sit-sup-sit. pt maintains sitting balance with SBA-CGA. pt stood with mod A x2 and able to maintain static standing x 80 sec with min-mod A and RW. no new goals met at this time. pt would continue to benefit from PT services.

## 2022-12-21 NOTE — DISCHARGE PLACEMENT REQUEST
Yamileth Slater (62 y.o. Female)     Date of Birth   1960    Social Security Number       Address   [address] [place] [address]    Home Phone   [phone]    MRN   [id number]       Baptism   None    Marital Status   Legally                             Admission Date   12/15/22    Admission Type   Urgent    Admitting Provider   Nestor Richards MD    Attending Provider   Nestor Richards MD    Department, Room/Bed   [address], 412/1       Discharge Date       Discharge Disposition       Discharge Destination                               Attending Provider: Nestor Richards MD    Allergies: Adhesive Tape, Nsaids, Latex, Other, Wound Dressing Adhesive    Isolation: Contact   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), 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)   Code Status: CPR    Ht: 157.5 cm (62.01\")   Wt: 107 kg (235 lb 6.4 oz)    Admission Cmt: None   Principal Problem: Venous insufficiency [I87.2]                 Active Insurance as of 12/15/2022     Primary Coverage     Payor Plan Insurance Group Employer/Plan Group    ANTHEM MEDICARE REPLACEMENT ANTHEM MEDICARE ADVANTAGE [id number]     Payor Plan Address Payor Plan Phone Number Payor Plan Fax Number Effective Dates    [address] [phone]  1/1/2022 - None Entered    [facility] [id number]       Subscriber Name Subscriber Birth Date Member ID       YAMILETH SLATER 1960 [id number]           Secondary Coverage     Payor Plan Insurance Group Employer/Plan Group    [place]      Payor Plan Address Payor Plan Phone Number Payor Plan Fax Number Effective Dates    [address] [phone]  6/28/2019 - None Entered    [facility] [id number]       Subscriber Name Subscriber Birth Date Member ID       YAMILETH SLATER 1960 [id number]                 Emergency Contacts      (Rel.) Home Phone Work Phone Mobile Phone    RAMONA KHAN (Sister) -- -- [phone]     Yamileth Chavez (Daughter) [phone] -- [phone]    Suzanne Rivera (Daughter) [phone] -- [phone]               Physical Therapy Notes (last 24 hours)      Oliver Bennett PTA at 22 1323  Version 1 of 1       Goal Outcome Evaluation:  Plan of Care Reviewed With: patient     Problem: Adult Inpatient Plan of Care  Goal: Plan of Care Review  Outcome: Ongoing, Progressing  Flowsheets (Taken 2022 1321)  Progress: improving  Plan of Care Reviewed With: patient  Outcome Evaluation: pt agreeable to PT. pt is uncomfrtable and wanting to move. pt requires min A to roll R/L. mod A x2 for sit-sup-sit. pt maintains sitting balance with SBA-CGA. pt stood with mod A x2 and able to maintain static standing x 80 sec with min-mod A and RW. no new goals met at this time. pt would continue to benefit from PT services.            Electronically signed by Oliver Bennett PTA at 22 1324     Oliver Bennett PTA at 22 1325  Version 1 of 1         Acute Care - Physical Therapy Treatment Note  [facility]     Patient Name: Yamileth Slater  : 1960  MRN: [id number]  Today's Date: 2022      Visit Dx:     ICD-10-CM ICD-9-CM   1. Venous insufficiency  I87.2 459.81   2. Pulmonary hypertension ([facility])  I27.20 416.8   3. Left foot pain  M79.672 729.5   4. PAD (peripheral artery disease) ([facility])  I73.9 443.9   5. On home oxygen therapy  Z99.81 V46.2   6. CKD (chronic kidney disease) requiring chronic dialysis ([facility])  N18.6 585.6    Z99.2 V45.11   7. Bilateral carotid artery stenosis  I65.23 433.10     433.30   8. Morbid obesity with BMI of 45.0-49.9, adult ([facility])  E66.01 278.01    Z68.42 V85.42   9. 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 ([facility])  L97.524 707.15     730.17   10. Coronary artery disease involving native coronary artery of native heart without angina pectoris  I25.10 414.01   11. MIKE and COPD overlap syndrome (HCC)  G47.33 327.23    J44.9 496   12. Impaired physical  mobility  Z74.09 781.99   13. Impaired mobility and ADLs  Z74.09 V49.89    Z78.9      Patient Active Problem List   Diagnosis   • Chronic midline low back pain without sciatica   • Vitamin D deficiency   • Tobacco dependence syndrome   • Shoulder joint pain   • Morbid obesity with BMI of 50.0-59.9, adult ([facility])   • Mixed hyperlipidemia   • Kidney stone   • History of colon polyps   • GERD (gastroesophageal reflux disease)   • Excoriated eczema   • Hypertension   • COPD (chronic obstructive pulmonary disease) ([facility])   • Chronic folliculitis   • Coronary artery disease involving native coronary artery of native heart without angina pectoris   • 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   • Hypothyroidism   • Carotid artery disease ([facility])   • [place] abuse   • PAD (peripheral artery disease) ([facility])   • Venous insufficiency   • LAFB (left anterior fascicular block)   • Pulmonary hypertension ([facility])   • Left hip pain   • Neck pain   • MIKE and COPD overlap syndrome ([facility])   • Pneumonia due to COVID-19 virus   • Hyperkalemia   • Cutaneous candidiasis   • Community acquired pneumonia of right middle lobe of lung   • MRSA infection   • Esophageal dysphagia   • Monilial esophagitis ([facility])   • NSTEMI, initial episode of care ([facility])   • Cellulitis of foot associated with diabetes mellitus ([facility])   • Urinary tract infection due to Proteus   • History of insertion of tunneled central venous catheter (CVC) with port   • Anemia due to chronic kidney disease, on chronic dialysis ([facility])   • Pneumonitis   • Personal history of noncompliance with medical treatment, presenting hazards to health   • Type 2 diabetes mellitus with circulatory disorder ([facility])   • Physical deconditioning   • ESRD on hemodialysis ([facility])   • DVT prophylaxis   • Anemia   • Ventilator associated pneumonia ([facility])   • Positive fecal occult blood test   • Yeast UTI   • Gangrene of both feet ([facility])   • Left foot pain   • Chronic ulcer of left foot with necrosis of  bone (HCC)   • On home oxygen therapy     Past Medical History:   Diagnosis Date   • Acute blood loss anemia 04/16/2017    Likely due to gastric oozing at this time. - Dr. Duarte (GI) was consulted and has now signed off, will follow up outpatient - pill colonoscopy showed AVMs - continue to monitor   • Acute cystitis with hematuria 03/31/2021 1/13: IV Rocephin 1 gm q 24 1/14 : transitioned  to omnicef 300mg. Urine cultures resulted and did not show growth. Omnicef discontinued as patient is asymptomatic   • Altered mental status 01/09/2022    - AMS on presentation - initial ABG pH 7.3, CO2 34 - Procal 0.29 - UA negative for acute cysitits -CTA head wnl  - Empiric Zosyn and Vancomycin -Lactate 2.5 on admission  - blood cultures no growth at 24 hours     • Anxiety    • CAD (coronary artery disease) 04/24/2021    S/P 3 stents 5/1/2021 for [facility] Continue ASA 81mg & Clopidogrel 75mg Continue Atorvastatin 40mg   • Carotid artery stenosis    • CHF (congestive heart failure) ([facility])    • Chronic obstructive lung disease ([facility])    • CKD (chronic kidney disease) stage 4, GFR 15-29 ml/min ([facility])    • CKD (chronic kidney disease), symptom management only, stage 5 ([facility]) 10/05/2020    Results from last 7 days Lab Units 12/15/21 0548 12/14/21 1323 12/14/21 0916 CREATININE mg/dL 3.92* 3.21* 3.32*  Baseline creatinine 2-3 GFR 13-25 GFR 15 Dialysis MWF, sees Dr. Lauren Nephrology consult,, appreciate recommendations Continue Bumex 1mg bid daily Holding Bumex 2mg 4 times a week   • Colonic polyp    • Coronary arteriosclerosis    • Diabetes mellitus (HCC)    • Diabetic neuropathy ([facility])    • Ear pain, right 10/18/2021    - canal trauma due to patient scratching and DMT2 - added cortisporin ear drops   • Elevated troponin 10/12/2021    -most likely from CKD -Trending down -Neg chest pain   • Generalized abdominal pain 07/01/2022    Could be due to initiation of tube feeds vs dyspepsia vs abdominal cramps related to no PO intake due to  intubation vs constipation Continue current laxative regiment  If no bowel movement by this afternoon will consider enema   • GERD (gastroesophageal reflux disease)    • GI bleed 05/13/2021    - GI will follow up outpatient - Protonix 40mg daily - Avoid medical DVT prophy and use mechanical at this time instead. - Continue to monitor - pill colonoscopy results showed AVMs   • History of transfusion    • Hypercholesterolemia    • Hyperosmolar hyperglycemic state (HHS) ([facility]) 06/25/2022    Serum glucose 605 on admission  Anion gap 16 PH 7.37 Bicarb 27.9 Continue fluids  Insulin drip with [facility] protocol  Anion gap closed around 10 AM, received one dose of Levamir subq, will stop insulin drip after 2 hrs     • Hypertension    • Hypokalemia 05/27/2022    Will replace as needed. Will be cautious in the setting of ESRD to avoid need for emergency dialysis   Monitor Qtc intervals on EKG     • Hypomagnesemia 06/27/2021    Monitor and replace   • Morbid obesity ([facility])    • Nephrolithiasis    • On mechanically assisted ventilation ([facility]) 06/26/2022    Vent management and sedation orders placed.  - [facility] intensivist group consulted for vent management appreciate recommendations  - plan to extubate today     • Peripheral vascular disease ([facility])    • Pleural effusion on right 06/26/2022    CXR on 6/30/22 read as a small upper left pulmonary edema vs early pneumonia.  Last Echo 1/2022 EF 61-65 % Continue to monitor  Procal slightly improved, CRP improved On Linezolid and meropenum      • PVD (peripheral vascular disease) ([facility])    • SIRS (systemic inflammatory response syndrome) ([facility]) 01/09/2022    Admission  - WBC 17.78   -   - RR 16 - 1/10: VSS/wnl - CXR - Mild pulm edema - Blood cultures no growth at 24 hours  - Procalcitonin 0.29 - UA : glucose 1000, negative Leucocytes/nitrate - Empiric Zosyn and Vancomcyin    • Sleep apnea    • Substance abuse ([facility])    • Vitamin D deficiency      Past Surgical History:   Procedure  Laterality Date   • CARDIAC CATHETERIZATION N/A 07/14/2020   • CARDIAC CATHETERIZATION N/A 04/23/2021    Procedure: Left Heart Cath;  Surgeon: Melba Romo MD;  Location: [facility] CATH INVASIVE LOCATION;  Service: Cardiology;  Laterality: N/A;   • CARDIAC CATHETERIZATION N/A 04/30/2021    Procedure: Percutaneous Coronary Intervention;  Surgeon: Russell Voss MD;  Location:  [facility] CATH INVASIVE LOCATION;  Service: Cardiovascular;  Laterality: N/A;   • CARDIAC CATHETERIZATION N/A 04/30/2021    Procedure: Stent NIKKI coronary;  Surgeon: Russell Voss MD;  Location: [facility] CATH INVASIVE LOCATION;  Service: Cardiovascular;  Laterality: N/A;   • CARDIAC CATHETERIZATION Left 11/13/2021    Procedure: Left Heart Cath;  Surgeon: Niall Rios MD;  Location: [facility] CATH INVASIVE LOCATION;  Service: Cardiology;  Laterality: Left;   • CAROTID STENT Left    • COLONOSCOPY     • COLONOSCOPY N/A 05/14/2021    Procedure: COLONOSCOPY;  Surgeon: Mingo Duarte MD;  Location: [facility] ENDOSCOPY;  Service: Gastroenterology;  Laterality: N/A;   • CORONARY ARTERY BYPASS GRAFT N/A 2013    CABG X 3   • CYSTOSCOPY BLADDER STONE LITHOTRIPSY Bilateral    • ENDOSCOPY N/A 04/12/2021    Procedure: ESOPHAGOGASTRODUODENOSCOPY;  Surgeon: Mingo Duarte MD;  Location: [facility] ENDOSCOPY;  Service: Gastroenterology;  Laterality: N/A;   • ENDOSCOPY N/A 05/14/2021    Procedure: ESOPHAGOGASTRODUODENOSCOPY;  Surgeon: Mingo Duarte MD;  Location: [facility] ENDOSCOPY;  Service: Gastroenterology;  Laterality: N/A;   • ENDOSCOPY N/A 01/28/2022    Procedure: ESOPHAGOGASTRODUODENOSCOPY;  Surgeon: Mingo Duarte MD;  Location: [facility] ENDOSCOPY;  Service: Gastroenterology;  Laterality: N/A;   • HYSTERECTOMY     • INTERVENTIONAL RADIOLOGY PROCEDURE N/A 10/21/2021    Procedure: tunneled central venous catheter placement;  Surgeon: Donnie Robles MD;  Location: [facility] ANGIO INVASIVE LOCATION;  Service: Interventional  Radiology;  Laterality: N/A;   • INTERVENTIONAL RADIOLOGY PROCEDURE N/A 01/27/2022    Procedure: tunneled central venous catheter placement;  Surgeon: Donnie Robles MD;  Location: [facility] INVASIVE LOCATION;  Service: Interventional Radiology;  Laterality: N/A;   • LAPAROSCOPIC TUBAL LIGATION     • TUNNELED VENOUS CATHETER PLACEMENT       PT Assessment (last 12 hours)     PT Evaluation and Treatment     Row Name 12/21/22 1206          Physical Therapy Time and Intention    Document Type therapy note (daily note)  -     Mode of Treatment individual therapy;physical therapy  -     Patient Effort good  -     Row Name 12/21/22 1206          General Information    Patient Profile Reviewed yes  -     Existing Precautions/Restrictions fall  -     Row Name 12/21/22 1206          Pain    Pretreatment Pain Rating 10/10  -     Posttreatment Pain Rating --  ending pain not assessed but pt appears much more comfortable.  -     Pain Location - buttock  -     Pain Intervention(s) Repositioned  -     Row Name 12/21/22 1206          Cognition    Orientation Status (Cognition) oriented to;person;place;situation  -     Personal Safety Interventions fall prevention program maintained;gait belt;muscle strengthening facilitated;nonskid shoes/slippers when out of bed;supervised activity  -     Row Name 12/21/22 1206          Bed Mobility    Bed Mobility supine-sit;sit-supine;scooting/bridging;rolling left;rolling right  -     Rolling Left [place] (Bed Mobility) minimum assist (75% patient effort)  -     Rolling Right [place] (Bed Mobility) minimum assist (75% patient effort)  -     Scooting/Bridging [place] (Bed Mobility) standby assist;verbal cues  -     Supine-Sit [place] (Bed Mobility) moderate assist (50% patient effort);2 person assist  -     Sit-Supine [place] (Bed Mobility) moderate assist (50% patient effort);2 person assist  -     Comment, (Bed Mobility) pt  maintains sitting balance with CGA-CGA.  -     Row Name 12/21/22 1206          Transfers    Transfers sit-stand transfer;stand-sit transfer  -     Comment, (Transfers) pt static stood x 80 sec. min-mod A to standin balance  -     Row Name 12/21/22 1206          Bed-Chair Transfer    Bed-Chair [place] (Transfers) --  -     Row Name 12/21/22 1206          Sit-Stand Transfer    Sit-Stand [place] (Transfers) moderate assist (50% patient effort);2 person assist  -     Assistive Device (Sit-Stand Transfers) walker, front-wheeled  -     Row Name 12/21/22 1206          Stand-Sit Transfer    Stand-Sit [place] (Transfers) moderate assist (50% patient effort)  -     Assistive Device (Stand-Sit Transfers) walker, front-wheeled  -     Row Name 12/21/22 1206          Gait/Stairs (Locomotion)    [place] Level (Gait) --  -[facility] Name 12/21/22 1206          Safety Issues, Functional Mobility    Impairments Affecting Function (Mobility) --  -     Row Name             Wound 08/09/22 1021 Right anterior great toe Incision    Wound - Properties Group Placement Date: 08/09/22  -SC Placement Time: 1021  -SC Side: Right  -SC Orientation: anterior  -SC Location: great toe  -SC Primary Wound Type: Incision  -MG    Retired Wound - Properties Group Placement Date: 08/09/22  -SC Placement Time: 1021  -SC Side: Right  -SC Orientation: anterior  -SC Location: great toe  -SC Primary Wound Type: Incision  -MG    Retired Wound - Properties Group Date first assessed: 08/09/22  -SC Time first assessed: 1021  -SC Side: Right  -SC Location: great toe  -SC Primary Wound Type: Incision  -MG    Row Name             Wound 12/15/22 1609 Left posterior    Wound - Properties Group Placement Date: 12/15/22  -LR Placement Time: 1609  -LR Side: Left  -LR Orientation: posterior  -LR    Retired Wound - Properties Group Placement Date: 12/15/22  -LR Placement Time: 1609  -LR Side: Left  -LR Orientation: posterior  -LR     Retired Wound - Properties Group Date first assessed: 12/15/22  -LR Time first assessed: 1609  -LR Side: Left  -LR    Row Name             Wound 12/15/22 1652 coccyx    Wound - Properties Group Placement Date: 12/15/22  -LR Placement Time: 1652  -LR Location: coccyx  -LR    Retired Wound - Properties Group Placement Date: 12/15/22  -LR Placement Time: 1652  -LR Location: coccyx  -LR    Retired Wound - Properties Group Date first assessed: 12/15/22  -LR Time first assessed: 1652  -LR Location: coccyx  -LR    Row Name             Wound 12/15/22 1728 Left anterior    Wound - Properties Group Placement Date: 12/15/22  -LR Placement Time: 1728  -LR Side: Left  -LR Orientation: anterior  -LR    Retired Wound - Properties Group Placement Date: 12/15/22  -LR Placement Time: 1728  -LR Side: Left  -LR Orientation: anterior  -LR    Retired Wound - Properties Group Date first assessed: 12/15/22  -LR Time first assessed: 1728  -LR Side: Left  -LR    Row Name             Wound 09/06/22 2356 Right anterior second toe Traumatic    Wound - Properties Group Placement Date: 09/06/22  -RL Placement Time: 2356  -RL Side: Right  -RL Orientation: anterior  -RL Location: second toe  -RL Primary Wound Type: Traumatic  -MG    Retired Wound - Properties Group Placement Date: 09/06/22  -RL Placement Time: 2356  -RL Side: Right  -RL Orientation: anterior  -RL Location: second toe  -RL Primary Wound Type: Traumatic  -MG    Retired Wound - Properties Group Date first assessed: 09/06/22  -RL Time first assessed: 2356  -RL Side: Right  -RL Location: second toe  -RL Primary Wound Type: Traumatic  -MG    Row Name             Wound 12/16/22 1647 Left lower leg Incision    Wound - Properties Group Placement Date: 12/16/22  -MJ Placement Time: 1647  -MJ Present on Hospital Admission: N  -MJ Side: Left  -MJ Orientation: lower  -MJ Location: leg  -MJ Primary Wound Type: Incision  -MJ    Retired Wound - Properties Group Placement Date: 12/16/22  -MJ  Placement Time: 1647  -MJ Present on Hospital Admission: N  -MJ Side: Left  -MJ Orientation: lower  -MJ Location: leg  -MJ Primary Wound Type: Incision  -MJ    Retired Wound - Properties Group Date first assessed: 12/16/22  - Time first assessed: 1647  -MJ Present on Hospital Admission: N  -MJ Side: Left  -MJ Location: leg  -MJ Primary Wound Type: Incision  -MJ    Row Name 12/21/22 1206          Vital Signs    Pre Systolic BP Rehab 104  -HENRIQUE     Pre Treatment Diastolic BP 69  -HENRIQUE     Post Systolic BP Rehab 98  -HENRIQUE     Post Treatment Diastolic BP 54  -HENRIQUE     Pretreatment Heart Rate (beats/min) 86  -HENRIQUE     Posttreatment Heart Rate (beats/min) 86  -HENRIQUE     Pre SpO2 (%) --  unable to obtain  -     Pre Patient Position Supine  -     Post Patient Position Supine  -     Row Name 12/21/22 1206          Positioning and Restraints    Pre-Treatment Position in bed  -HENRIQUE     Post Treatment Position bed  -     In Bed fowlers;call light within reach;encouraged to call for assist;exit alarm on  all needs met  -     Row Name 12/21/22 1206          Therapy Assessment/Plan (PT)    Rehab Potential (PT) good, to achieve stated therapy goals  -     Criteria for Skilled Interventions Met (PT) yes;meets criteria;skilled treatment is necessary  -     Therapy Frequency (PT) daily  x2 if tolerated  -     Row Name 12/21/22 1206          Bed Mobility Goal 1 (PT)    Activity/Assistive Device (Bed Mobility Goal 1, PT) sit to supine;supine to sit  -     [place] Level/Cues Needed (Bed Mobility Goal 1, PT) minimum assist (75% or more patient effort);moderate assist (50-74% patient effort)  -     Time Frame (Bed Mobility Goal 1, PT) by discharge  -     Strategies/Barriers (Bed Mobility Goal 1, PT) co-morbidities  -     Progress/Outcomes (Bed Mobility Goal 1, PT) goal not met  -     Row Name 12/21/22 1206          Transfer Goal 1 (PT)    Activity/Assistive Device (Transfer Goal 1, PT)  sit-to-stand/stand-to-sit;bed-to-chair/chair-to-bed;wheelchair transfer  -     [place] Level/Cues Needed (Transfer Goal 1, PT) minimum assist (75% or more patient effort);moderate assist (50-74% patient effort)  -HENRIQUE     Time Frame (Transfer Goal 1, PT) 2 weeks  -HENRIQUE     Progress/Outcome (Transfer Goal 1, PT) goal not met  -     Row Name 12/21/22 1206          ROM Goal 1 (PT)    ROM Goal 1 (PT) Pt will tolerate bed in chair position 30 minutes progressing to OOB in chair with VSS  -HENRIQUE     Time Frame (ROM Goal 1, PT) 2 weeks  -HENRIQUE     Progress/Outcome (ROM Goal 1, PT) goal not met  -     Row Name 12/21/22 1206          Problem Specific Goal 1 (PT)    Problem Specific Goal 1 (PT) Pt will propel w/c 50ft or more with SBA with VSS  -HENRIQUE     Time Frame (Problem Specific Goal 1, PT) 2 weeks;3 weeks  -HENRIQUE     Progress/Outcome (Problem Specific Goal 1, PT) goal not met  -           User Key  (r) = Recorded By, (t) = Taken By, (c) = Cosigned By    Initials Name Provider Type    Sharon Franklin, RN Registered Nurse    Tracy Shaw RN Registered Nurse    Oliver Mejía, PTA Physical Therapist Assistant    Lisha Zhao, RN Registered Nurse    Rabia Carty RN Registered Nurse    Milly Landin, RN Registered Nurse                Physical Therapy Education     Title: PT OT SLP Therapies (In Progress)     Topic: Physical Therapy (In Progress)     Point: Mobility training (In Progress)     Learning Progress Summary           Patient Acceptance, E, NR by  at 12/21/2022 1321    Acceptance, E, NR by  at 12/20/2022 1322                   Point: Home exercise program (Not Started)     Learner Progress:  Not documented in this visit.          Point: Body mechanics (Not Started)     Learner Progress:  Not documented in this visit.          Point: Precautions (Not Started)     Learner Progress:  Not documented in this visit.                      User Key     Initials Effective Dates Name  Provider Type Discipline     06/16/21 -  Oliver Bennett PTA Physical Therapist Assistant PT              PT Recommendation and Plan  Anticipated Discharge Disposition (PT): LT[facility] ([facility]), inpatient rehabilitation facility, extended care facility  Therapy Frequency (PT): daily (x2 if tolerated)  Plan of Care Reviewed With: patient  Progress: improving  Outcome Evaluation: pt agreeable to PT. pt is uncomfrtable and wanting to move. pt requires min A to roll R/L. mod A x2 for sit-sup-sit. pt maintains sitting balance with SBA-CGA. pt stood mod A x2 and able to maintain static standing x 80 sec with min-mod A and RW. no new goals met at this time. pt would continue to benefit from PT services.   Outcome Measures     Row Name 12/21/22 1206 12/20/22 1300          How much help from another person do you currently need...    Turning from your back to your side while in flat bed without using bedrails? 3  -HENRIQUE 2  -HENRIQUE     Moving from lying on back to sitting on the side of a flat bed without bedrails? 2  -HENRIQUE 2  -HENRIQUE     Moving to and from a bed to a chair (including a wheelchair)? 2  -HENRIQUE 1  -HENRIQUE     Standing up from a chair using your arms (e.g., wheelchair, bedside chair)? 2  -HENRIQUE 1  -HENRIQUE     Climbing 3-5 steps with a railing? 1  -HENRIQUE 1  -HENRIQUE     To walk in hospital room? 1  -HENRIQUE 1  -HENRIQUE     AM-PAC 6 Clicks Score (PT) 11  -HENRIQUE 8  -HENRIQUE        Functional Assessment    Outcome Measure Options AM-PAC 6 Clicks Basic Mobility (PT)  -HENRIQUE AM-PAC 6 Clicks Basic Mobility (PT)  -HENRIQUE           User Key  (r) = Recorded By, (t) = Taken By, (c) = Cosigned By    Initials Name Provider Type     Oliver Bennett PTA Physical Therapist Assistant                 Time Calculation:    PT Charges     Row Name 12/21/22 1324             Time Calculation    Start Time 1206  -HENRIQUE      Stop Time 1237  -HENRIQUE      Time Calculation (min) 31 min  -HENRIQUE         Time Calculation- PT    Total Timed Code Minutes- PT 31 minute(s)  -HENRIQUE         Timed  Charges    05797 - PT Therapeutic Activity Minutes 31  -HENRIQUE         Total Minutes    Timed Charges Total Minutes 31  -HENRIQUE       Total Minutes 31  -HENRIQUE            User Key  (r) = Recorded By, (t) = Taken By, (c) = Cosigned By    Initials Name Provider Type    Oliver Mejía PTA Physical Therapist Assistant              Therapy Charges for Today     Code Description Service Date Service Provider Modifiers Qty    10955813620 HC PT THER PROC EA 15 MIN 2022 Oliver Bennett PTA GP 2    38635482537 HC PT THERAPEUTIC ACT EA 15 MIN 2022 Oliver Bennett PTA GP 2          PT G-Codes  Outcome Measure Options: AM-PAC 6 Clicks Basic Mobility (PT)  AM-PAC 6 Clicks Score (PT): 11  AM-PAC 6 Clicks Score (OT): 8    Oliver Bennett PTA  2022      Electronically signed by Oliver Bennett PTA at 22 1325          Occupational Therapy Notes (last 24 hours)      Nissa Bey COTA at 22 1432          Patient Name: Yamileth Slater  : 1960    MRN: [id number]                              Today's Date: 2022       Admit Date: 12/15/2022    Visit Dx:     ICD-10-CM ICD-9-CM   1. Venous insufficiency  I87.2 459.81   2. Pulmonary hypertension ([facility])  I27.20 416.8   3. Left foot pain  M79.672 729.5   4. PAD (peripheral artery disease) ([facility])  I73.9 443.9   5. On home oxygen therapy  Z99.81 V46.2   6. CKD (chronic kidney disease) requiring chronic dialysis ([facility])  N18.6 585.6    Z99.2 V45.11   7. Bilateral carotid artery stenosis  I65.23 433.10     433.30   8. Morbid obesity with BMI of 45.0-49.9, adult ([facility])  E66.01 278.01    Z68.42 V85.42   9. 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 ([facility])  L97.524 707.15     730.17   10. Coronary artery disease involving native coronary artery of native heart without angina pectoris  I25.10 414.01   11. MIKE and COPD overlap syndrome ([facility])  G47.33 327.23    J44.9 496   12. Impaired physical mobility  Z74.09 781.99   13. Impaired mobility and  ADLs  Z74.09 V49.89    Z78.9      Patient Active Problem List   Diagnosis   • Chronic midline low back pain without sciatica   • Vitamin D deficiency   • Tobacco dependence syndrome   • Shoulder joint pain   • Morbid obesity with BMI of 50.0-59.9, adult ([facility])   • Mixed hyperlipidemia   • Kidney stone   • History of colon polyps   • GERD (gastroesophageal reflux disease)   • Excoriated eczema   • Hypertension   • COPD (chronic obstructive pulmonary disease) ([facility])   • Chronic folliculitis   • Coronary artery disease involving native coronary artery of native heart without angina pectoris   • 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   • Hypothyroidism   • Carotid artery disease ([facility])   • [place] abuse   • PAD (peripheral artery disease) ([facility])   • Venous insufficiency   • LAFB (left anterior fascicular block)   • Pulmonary hypertension ([facility])   • Left hip pain   • Neck pain   • MIKE and COPD overlap syndrome ([facility])   • Pneumonia due to COVID-19 virus   • Hyperkalemia   • Cutaneous candidiasis   • Community acquired pneumonia of right middle lobe of lung   • MRSA infection   • Esophageal dysphagia   • Monilial esophagitis ([facility])   • NSTEMI, initial episode of care ([facility])   • Cellulitis of foot associated with diabetes mellitus ([facility])   • Urinary tract infection due to Proteus   • History of insertion of tunneled central venous catheter (CVC) with port   • Anemia due to chronic kidney disease, on chronic dialysis ([facility])   • Pneumonitis   • Personal history of noncompliance with medical treatment, presenting hazards to health   • Type 2 diabetes mellitus with circulatory disorder ([facility])   • Physical deconditioning   • ESRD on hemodialysis ([facility])   • DVT prophylaxis   • Anemia   • Ventilator associated pneumonia ([facility])   • Positive fecal occult blood test   • Yeast UTI   • Gangrene of both feet ([facility])   • Left foot pain   • 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 ([facility])   • On home oxygen therapy     Past  Medical History:   Diagnosis Date   • Acute blood loss anemia 04/16/2017    Likely due to gastric oozing at this time. - Dr. Duarte (GI) was consulted and has now signed off, will follow up outpatient - pill colonoscopy showed AVMs - continue to monitor   • Acute cystitis with hematuria 03/31/2021 1/13: IV Rocephin 1 gm q 24 1/14 : transitioned  to omnicef 300mg. Urine cultures resulted and did not show growth. Omnicef discontinued as patient is asymptomatic   • Altered mental status 01/09/2022    - AMS on presentation - initial ABG pH 7.3, CO2 34 - Procal 0.29 - UA negative for acute cysitits -CTA head wnl  - Empiric Zosyn and Vancomycin -Lactate 2.5 on admission  - blood cultures no growth at 24 hours     • Anxiety    • CAD (coronary artery disease) 04/24/2021    S/P 3 stents 5/1/2021 for [facility] Continue ASA 81mg & Clopidogrel 75mg Continue Atorvastatin 40mg   • Carotid artery stenosis    • CHF (congestive heart failure) ([facility])    • Chronic obstructive lung disease ([facility])    • CKD (chronic kidney disease) stage 4, GFR 15-29 ml/min ([facility])    • CKD (chronic kidney disease), symptom management only, stage 5 ([facility]) 10/05/2020    Results from last 7 days Lab Units 12/15/21 0548 12/14/21 1323 12/14/21 0916 CREATININE mg/dL 3.92* 3.21* 3.32*  Baseline creatinine 2-3 GFR 13-25 GFR 15 Dialysis MWF, sees Dr. Lauren Nephrology consult,, appreciate recommendations Continue Bumex 1mg bid daily Holding Bumex 2mg 4 times a week   • Colonic polyp    • Coronary arteriosclerosis    • Diabetes mellitus ([facility])    • Diabetic neuropathy ([facility])    • Ear pain, right 10/18/2021    - canal trauma due to patient scratching and DMT2 - added cortisporin ear drops   • Elevated troponin 10/12/2021    -most likely from CKD -Trending down -Neg chest pain   • Generalized abdominal pain 07/01/2022    Could be due to initiation of tube feeds vs dyspepsia vs abdominal cramps related to no PO intake due to intubation vs constipation Continue current  laxative regiment  If no bowel movement by this afternoon will consider enema   • GERD (gastroesophageal reflux disease)    • GI bleed 05/13/2021    - GI will follow up outpatient - Protonix 40mg daily - Avoid medical DVT prophy and use mechanical at this time instead. - Continue to monitor - pill colonoscopy results showed AVMs   • History of transfusion    • Hypercholesterolemia    • Hyperosmolar hyperglycemic state (HHS) ([facility]) 06/25/2022    Serum glucose 605 on admission  Anion gap 16 PH 7.37 Bicarb 27.9 Continue fluids  Insulin drip with [facility] protocol  Anion gap closed around 10 AM, received one dose of Levamir subq, will stop insulin drip after 2 hrs     • Hypertension    • Hypokalemia 05/27/2022    Will replace as needed. Will be cautious in the setting of ESRD to avoid need for emergency dialysis   Monitor Qtc intervals on EKG     • Hypomagnesemia 06/27/2021    Monitor and replace   • Morbid obesity ([facility])    • Nephrolithiasis    • On mechanically assisted ventilation ([facility]) 06/26/2022    Vent management and sedation orders placed.  - [facility] intensivist group consulted for vent management appreciate recommendations  - plan to extubate today     • Peripheral vascular disease ([facility])    • Pleural effusion on right 06/26/2022    CXR on 6/30/22 read as a small upper left pulmonary edema vs early pneumonia.  Last Echo 1/2022 EF 61-65 % Continue to monitor  Procal slightly improved, CRP improved On Linezolid and meropenum      • PVD (peripheral vascular disease) ([facility])    • SIRS (systemic inflammatory response syndrome) ([facility]) 01/09/2022    Admission  - WBC 17.78   -   - RR 16 - 1/10: VSS/wnl - CXR - Mild pulm edema - Blood cultures no growth at 24 hours  - Procalcitonin 0.29 - UA : glucose 1000, negative Leucocytes/nitrate - Empiric Zosyn and Vancomcyin    • Sleep apnea    • Substance abuse ([facility])    • Vitamin D deficiency      Past Surgical History:   Procedure Laterality Date   • CARDIAC CATHETERIZATION  N/A 07/14/2020   • CARDIAC CATHETERIZATION N/A 04/23/2021    Procedure: Left Heart Cath;  Surgeon: Melba Romo MD;  Location: [facility] CATH INVASIVE LOCATION;  Service: Cardiology;  Laterality: N/A;   • CARDIAC CATHETERIZATION N/A 04/30/2021    Procedure: Percutaneous Coronary Intervention;  Surgeon: Russell Voss MD;  Location: [facility] CATH INVASIVE LOCATION;  Service: Cardiovascular;  Laterality: N/A;   • CARDIAC CATHETERIZATION N/A 04/30/2021    Procedure: Stent NIKKI coronary;  Surgeon: Russell Voss MD;  Location: [facility] CATH INVASIVE LOCATION;  Service: Cardiovascular;  Laterality: N/A;   • CARDIAC CATHETERIZATION Left 11/13/2021    Procedure: Left Heart Cath;  Surgeon: Niall Rios MD;  Location: [facility] CATH INVASIVE LOCATION;  Service: Cardiology;  Laterality: Left;   • CAROTID STENT Left    • COLONOSCOPY     • COLONOSCOPY N/A 05/14/2021    Procedure: COLONOSCOPY;  Surgeon: Mingo Duarte MD;  Location: [facility] ENDOSCOPY;  Service: Gastroenterology;  Laterality: N/A;   • CORONARY ARTERY BYPASS GRAFT N/A 2013    CABG X 3   • CYSTOSCOPY BLADDER STONE LITHOTRIPSY Bilateral    • ENDOSCOPY N/A 04/12/2021    Procedure: ESOPHAGOGASTRODUODENOSCOPY;  Surgeon: Mingo Duarte MD;  Location: [facility] ENDOSCOPY;  Service: Gastroenterology;  Laterality: N/A;   • ENDOSCOPY N/A 05/14/2021    Procedure: ESOPHAGOGASTRODUODENOSCOPY;  Surgeon: Mingo Duarte MD;  Location: [facility] ENDOSCOPY;  Service: Gastroenterology;  Laterality: N/A;   • ENDOSCOPY N/A 01/28/2022    Procedure: ESOPHAGOGASTRODUODENOSCOPY;  Surgeon: Mingo Duarte MD;  Location: [facility] ENDOSCOPY;  Service: Gastroenterology;  Laterality: N/A;   • HYSTERECTOMY     • INTERVENTIONAL RADIOLOGY PROCEDURE N/A 10/21/2021    Procedure: tunneled central venous catheter placement;  Surgeon: Donnie Robles MD;  Location: [facility] ANGIO INVASIVE LOCATION;  Service: Interventional Radiology;  Laterality: N/A;   • INTERVENTIONAL  RADIOLOGY PROCEDURE N/A 01/27/2022    Procedure: tunneled central venous catheter placement;  Surgeon: Donnie Robles MD;  Location: [facility] ANGIO INVASIVE LOCATION;  Service: Interventional Radiology;  Laterality: N/A;   • LAPAROSCOPIC TUBAL LIGATION     • TUNNELED VENOUS CATHETER PLACEMENT        General Information     Row Name 12/21/22 0720          OT Time and Intention    Document Type therapy note (daily note)  -BB     Mode of Treatment individual therapy;occupational therapy  -BB     Row Name 12/21/22 0720          General Information    Patient Profile Reviewed yes  -BB     Existing Precautions/Restrictions fall  -BB     Row Name 12/21/22 0720          Cognition    Orientation Status (Cognition) oriented to;person;place;situation  -     Row Name 12/21/22 0720          Safety Issues, Functional Mobility    Impairments Affecting Function (Mobility) endurance/activity tolerance;pain;strength;range of motion (ROM)  -BB           User Key  (r) = Recorded By, (t) = Taken By, (c) = Cosigned By    Initials Name Provider Type    BB Nissa Bey COTA Occupational Therapist Assistant                 Mobility/ADL's     Row Name 12/21/22 0720          Bed Mobility    Bed Mobility supine-sit;sit-supine;scooting/bridging;rolling left;rolling right  -     Row Name 12/21/22 0720          Transfers    Transfers sit-stand transfer;stand-sit transfer  -     Row Name 12/21/22 0720          Bed-Chair Transfer    Bed-Chair [place] (Transfers) not tested  -     Row Name 12/21/22 0720          Self-Feeding Assessment/Training    [place] Level (Feeding) finger foods;liquids to mouth;prepare tray/open items;scoop food and bring to mouth;contact guard assist  -BB     Position (Self-Feeding) sitting up in bed  -BB           User Key  (r) = Recorded By, (t) = Taken By, (c) = Cosigned By    Initials Name Provider Type    Nissa Guillermo COTA Occupational Therapist Assistant                Obj/Interventions     Row Name 12/21/22 0720          Range of Motion Comprehensive    General Range of Motion other (see comments)  -BB     Row Name 12/21/22 0720          Strength Comprehensive (MMT)    General Manual Muscle Testing (MMT) Assessment other (see comments)  -BB           User Key  (r) = Recorded By, (t) = Taken By, (c) = Cosigned By    Initials Name Provider Type    BB Nissa Bey COTA Occupational Therapist Assistant               Goals/Plan     Row Name 12/21/22 0720          Transfer Goal 1 (OT)    Activity/Assistive Device (Transfer Goal 1, OT) bed-to-chair/chair-to-bed;commode, bedside without drop arms;commode, bedside with drop arms;walker, rolling  -BB     [place] Level/Cues Needed (Transfer Goal 1, OT) minimum assist (75% or more patient effort)  -BB     Time Frame (Transfer Goal 1, OT) long term goal (LTG);by discharge  -BB     Progress/Outcome (Transfer Goal 1, OT) goal not met  -BB     Row Name 12/21/22 0720          Bathing Goal 1 (OT)    Activity/Device (Bathing Goal 1, OT) upper body bathing  -BB     [place] Level/Cues Needed (Bathing Goal 1, OT) standby assist  -BB     Time Frame (Bathing Goal 1, OT) long term goal (LTG);by discharge  -BB     Progress/Outcomes (Bathing Goal 1, OT) goal not met  -BB     Row Name 12/21/22 0720          Dressing Goal 1 (OT)    Activity/Device (Dressing Goal 1, OT) upper body dressing  -BB     [place]/Cues Needed (Dressing Goal 1, OT) standby assist  -BB     Time Frame (Dressing Goal 1, OT) long term goal (LTG);by discharge  -BB     Progress/Outcome (Dressing Goal 1, OT) goal not met  -BB     Row Name 12/21/22 0720          Grooming Goal 1 (OT)    Activity/Device (Grooming Goal 1, OT) grooming skills, all  -BB     [place] (Grooming Goal 1, OT) standby assist  -BB     Time Frame (Grooming Goal 1, OT) long term goal (LTG);by discharge  -BB     Progress/Outcome (Grooming Goal 1, OT) goal not met  -BB     Row Name 12/21/22 0720           Self-Feeding Goal 1 (OT)    Activity/Device (Self-Feeding Goal 1, OT) self-feeding skills, all  -BB     [place] Level/Cues Needed (Self-Feeding Goal 1, OT) independent  -BB     Time Frame (Self-Feeding Goal 1, OT) long term goal (LTG);by discharge  -BB     Progress/Outcomes (Self-Feeding Goal 1, OT) goal not met  -BB           User Key  (r) = Recorded By, (t) = Taken By, (c) = Cosigned By    Initials Name Provider Type    BB Nissa Bey COTA Occupational Therapist Assistant               Clinical Impression     Row Name 12/21/22 0720          Pain Assessment    Pretreatment Pain Rating 10/10  -BB     Posttreatment Pain Rating 10/10  ending pain not assessed but pt appears much more comfortable.  -BB     Pain Location - buttock  -BB     Pain Intervention(s) Distraction  -BB     Row Name 12/21/22 0720          Pain Scale: FACES Pre/Post-Treatment    Pain: FACES Scale, Pretreatment 6-->hurts even more  -BB     Row Name 12/21/22 0720          Plan of Care Review    Plan of Care Reviewed With patient  -BB     Progress improving  -BB     Outcome Evaluation Pt agrees to OT this am. Pt's breakfast tray arrived and is Min A for setting tray up and CGA for self feeding and drinking tasks. Pt to have dialysis this morning and OT tx ended. Continue OT POC  -BB     Row Name 12/21/22 0720          Therapy Assessment/Plan (OT)    Rehab Potential (OT) good, to achieve stated therapy goals  -BB     Criteria for Skilled Therapeutic Interventions Met (OT) yes;meets criteria;skilled treatment is necessary  -BB     Therapy Frequency (OT) daily  -BB     Row Name 12/21/22 0720          Vital Signs    Pretreatment Heart Rate (beats/min) 83  -BB     Pre SpO2 (%) 97  -BB     O2 Delivery Pre Treatment supplemental O2  -BB     Pre Patient Position Supine  -BB     Row Name 12/21/22 0720          Positioning and Restraints    Pre-Treatment Position in bed  -BB     Post Treatment Position bed  -BB     In Bed fowlers;call light  within reach;encouraged to call for assist;exit alarm on  -BB           User Key  (r) = Recorded By, (t) = Taken By, (c) = Cosigned By    Initials Name Provider Type    Nissa Guillermo COTA Occupational Therapist Assistant               Outcome Measures     Row Name 12/21/22 0720          How much help from another is currently needed...    Putting on and taking off regular lower body clothing? 1  -BB     Bathing (including washing, rinsing, and drying) 1  -BB     Toileting (which includes using toilet bed pan or urinal) 1  -BB     Putting on and taking off regular upper body clothing 1  -BB     Taking care of personal grooming (such as brushing teeth) 2  -BB     Eating meals 3  -BB     AM-PAC 6 Clicks Score (OT) 9  -BB     Row Name 12/21/22 1206          How much help from another person do you currently need...    Turning from your back to your side while in flat bed without using bedrails? 3  -HENRIQUE     Moving from lying on back to sitting on the side of a flat bed without bedrails? 2  -HENRIQUE     Moving to and from a bed to a chair (including a wheelchair)? 2  -HENRIQUE     Standing up from a chair using your arms (e.g., wheelchair, bedside chair)? 2  -HENRIQUE     Climbing 3-5 steps with a railing? 1  -HENRIQUE     To walk in hospital room? 1  -HENRIQUE     AM-PAC 6 Clicks Score (PT) 11  -HENRIQUE     Highest level of mobility 4 --> Transferred to chair/commode  -HENRIQUE     Row Name 12/21/22 1206          Functional Assessment    Outcome Measure Options AM-PAC 6 Clicks Basic Mobility (PT)  -HENRIQUE           User Key  (r) = Recorded By, (t) = Taken By, (c) = Cosigned By    Initials Name Provider Type    Oliver Mejía, PTA Physical Therapist Assistant    Nissa Guillermo COTA Occupational Therapist Assistant                Occupational Therapy Education     Title: PT OT SLP Therapies (In Progress)     Topic: Occupational Therapy (In Progress)     Point: ADL training (In Progress)     Description:   Instruct learner(s) on proper safety  adaptation and remediation techniques during self care or transfers.   Instruct in proper use of assistive devices.              Learning Progress Summary           Patient Acceptance, E, NR by  at 12/21/2022 1431    Acceptance, E,TB, NR by SA at 12/20/2022 0919    Comment: OT POC, Role of OT, transfer training                   Point: Home exercise program (Not Started)     Description:   Instruct learner(s) on appropriate technique for monitoring, assisting and/or progressing therapeutic exercises/activities.              Learner Progress:  Not documented in this visit.          Point: Precautions (In Progress)     Description:   Instruct learner(s) on prescribed precautions during self-care and functional transfers.              Learning Progress Summary           Patient Acceptance, E,TB, NR by SA at 12/20/2022 0919    Comment: OT POC, Role of OT, transfer training                   Point: Body mechanics (In Progress)     Description:   Instruct learner(s) on proper positioning and spine alignment during self-care, functional mobility activities and/or exercises.              Learning Progress Summary           Patient Acceptance, E,TB, NR by SA at 12/20/2022 0919    Comment: OT POC, Role of OT, transfer training                               User Key     Initials Effective Dates Name Provider Type Discipline     06/16/21 -  Nissa Bey COTA Occupational Therapist Assistant OT     08/11/22 -  Deanna Quiles OT Occupational Therapist OT              OT Recommendation and Plan  Therapy Frequency (OT): daily  Plan of Care Review  Plan of Care Reviewed With: patient  Progress: improving  Outcome Evaluation: Pt agrees to OT this am. Pt's breakfast tray arrived and is Min A for setting tray up and CGA for self feeding and drinking tasks. Pt to have dialysis this morning and OT tx ended. Continue OT POC     Time Calculation:    Time Calculation- OT     Row Name 12/21/22 1431             Time  Calculation- OT    OT Start Time 0720  -BB      OT Stop Time 0745  -BB      OT Time Calculation (min) 25 min  -BB      Total Timed Code Minutes- OT 25 minute(s)  -BB      OT Received On 12/21/22  -BB         Timed Charges    32900 - OT Self Care/Mgmt Minutes 25  -BB         Total Minutes    Timed Charges Total Minutes 25  -BB       Total Minutes 25  -BB            User Key  (r) = Recorded By, (t) = Taken By, (c) = Cosigned By    Initials Name Provider Type    Nissa Guillermo COTA Occupational Therapist Assistant              Therapy Charges for Today     Code Description Service Date Service Provider Modifiers Qty    53920252229 HC OT SELF CARE/MGMT/TRAIN EA 15 MIN 12/21/2022 Nissa Bey COTA GO 2               ISHAN Mcgovern  12/21/2022    Electronically signed by Nissa Bey COTA at 12/21/22 1432     Nissa Bey COTA at 12/21/22 1431        Goal Outcome Evaluation:  Plan of Care Reviewed With: patient        Progress: improving  Outcome Evaluation: Pt agrees to OT this am. Pt's breakfast tray arrived and is Min A for setting tray up and CGA for self feeding and drinking tasks. Pt to have dialysis this morning and OT tx ended. Continue OT POC    Electronically signed by Nissa Bey COTA at 12/21/22 1431

## 2022-12-21 NOTE — THERAPY TREATMENT NOTE
Patient Name: Yamileth Slater  : 1960    MRN: [id number]                              Today's Date: 2022       Admit Date: 12/15/2022    Visit Dx:     ICD-10-CM ICD-9-CM   1. Venous insufficiency  I87.2 459.81   2. Pulmonary hypertension ([facility])  I27.20 416.8   3. Left foot pain  M79.672 729.5   4. PAD (peripheral artery disease) ([facility])  I73.9 443.9   5. On home oxygen therapy  Z99.81 V46.2   6. CKD (chronic kidney disease) requiring chronic dialysis ([facility])  N18.6 585.6    Z99.2 V45.11   7. Bilateral carotid artery stenosis  I65.23 433.10     433.30   8. Morbid obesity with BMI of 45.0-49.9, adult ([facility])  E66.01 278.01    Z68.42 V85.42   9. 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 ([facility])  L97.524 707.15     730.17   10. Coronary artery disease involving native coronary artery of native heart without angina pectoris  I25.10 414.01   11. MIKE and COPD overlap syndrome ([facility])  G47.33 327.23    J44.9 496   12. Impaired physical mobility  Z74.09 781.99   13. Impaired mobility and ADLs  Z74.09 V49.89    Z78.9      Patient Active Problem List   Diagnosis   • Chronic midline low back pain without sciatica   • Vitamin D deficiency   • Tobacco dependence syndrome   • Shoulder joint pain   • Morbid obesity with BMI of 50.0-59.9, adult ([facility])   • Mixed hyperlipidemia   • Kidney stone   • History of colon polyps   • GERD (gastroesophageal reflux disease)   • Excoriated eczema   • Hypertension   • COPD (chronic obstructive pulmonary disease) ([facility])   • Chronic folliculitis   • Coronary artery disease involving native coronary artery of native heart without angina pectoris   • 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   • Hypothyroidism   • Carotid artery disease ([facility])   • [place] abuse   • PAD (peripheral artery disease) ([facility])   • Venous insufficiency   • LAFB (left anterior fascicular block)   • Pulmonary hypertension ([facility])   • Left hip pain   • Neck pain   • MIKE and COPD overlap syndrome ([facility])    • Pneumonia due to COVID-19 virus   • Hyperkalemia   • Cutaneous candidiasis   • Community acquired pneumonia of right middle lobe of lung   • MRSA infection   • Esophageal dysphagia   • Monilial esophagitis ([facility])   • NSTEMI, initial episode of care ([facility])   • Cellulitis of foot associated with diabetes mellitus ([facility])   • Urinary tract infection due to Proteus   • History of insertion of tunneled central venous catheter (CVC) with port   • Anemia due to chronic kidney disease, on chronic dialysis ([facility])   • Pneumonitis   • Personal history of noncompliance with medical treatment, presenting hazards to health   • Type 2 diabetes mellitus with circulatory disorder ([facility])   • Physical deconditioning   • ESRD on hemodialysis ([facility])   • DVT prophylaxis   • Anemia   • Ventilator associated pneumonia ([facility])   • Positive fecal occult blood test   • Yeast UTI   • Gangrene of both feet ([facility])   • Left foot pain   • 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 ([facility])   • On home oxygen therapy     Past Medical History:   Diagnosis Date   • Acute blood loss anemia 04/16/2017    Likely due to gastric oozing at this time. - Dr. Duarte (GI) was consulted and has now signed off, will follow up outpatient - pill colonoscopy showed AVMs - continue to monitor   • Acute cystitis with hematuria 03/31/2021 1/13: IV Rocephin 1 gm q 24 1/14 : transitioned  to omnicef 300mg. Urine cultures resulted and did not show growth. Omnicef discontinued as patient is asymptomatic   • Altered mental status 01/09/2022    - AMS on presentation - initial ABG pH 7.3, CO2 34 - Procal 0.29 - UA negative for acute cysitits -CTA head wnl  - Empiric Zosyn and Vancomycin -Lactate 2.5 on admission  - blood cultures no growth at 24 hours     • Anxiety    • CAD (coronary artery disease) 04/24/2021    S/P 3 stents 5/1/2021 for [facility] Continue ASA 81mg & Clopidogrel 75mg Continue Atorvastatin 40mg   • Carotid artery stenosis    • CHF (congestive heart failure) ([facility])     • Chronic obstructive lung disease (HCC)    • CKD (chronic kidney disease) stage 4, GFR 15-29 ml/min ([facility])    • CKD (chronic kidney disease), symptom management only, stage 5 ([facility]) 10/05/2020    Results from last 7 days Lab Units 12/15/21 0548 12/14/21 1323 12/14/21 0916 CREATININE mg/dL 3.92* 3.21* 3.32*  Baseline creatinine 2-3 GFR 13-25 GFR 15 Dialysis MWF, sees Dr. Lauren Nephrology consult,, appreciate recommendations Continue Bumex 1mg bid daily Holding Bumex 2mg 4 times a week   • Colonic polyp    • Coronary arteriosclerosis    • Diabetes mellitus ([facility])    • Diabetic neuropathy ([facility])    • Ear pain, right 10/18/2021    - canal trauma due to patient scratching and DMT2 - added cortisporin ear drops   • Elevated troponin 10/12/2021    -most likely from CKD -Trending down -Neg chest pain   • Generalized abdominal pain 07/01/2022    Could be due to initiation of tube feeds vs dyspepsia vs abdominal cramps related to no PO intake due to intubation vs constipation Continue current laxative regiment  If no bowel movement by this afternoon will consider enema   • GERD (gastroesophageal reflux disease)    • GI bleed 05/13/2021    - GI will follow up outpatient - Protonix 40mg daily - Avoid medical DVT prophy and use mechanical at this time instead. - Continue to monitor - pill colonoscopy results showed AVMs   • History of transfusion    • Hypercholesterolemia    • Hyperosmolar hyperglycemic state (HHS) ([facility]) 06/25/2022    Serum glucose 605 on admission  Anion gap 16 PH 7.37 Bicarb 27.9 Continue fluids  Insulin drip with HHS protocol  Anion gap closed around 10 AM, received one dose of Levamir subq, will stop insulin drip after 2 hrs     • Hypertension    • Hypokalemia 05/27/2022    Will replace as needed. Will be cautious in the setting of ESRD to avoid need for emergency dialysis   Monitor Qtc intervals on EKG     • Hypomagnesemia 06/27/2021    Monitor and replace   • Morbid obesity ([facility])    • Nephrolithiasis    •  On mechanically assisted ventilation (HCC) 06/26/2022    Vent management and sedation orders placed.  - [facility] intensivist group consulted for vent management appreciate recommendations  - plan to extubate today     • Peripheral vascular disease ([facility])    • Pleural effusion on right 06/26/2022    CXR on 6/30/22 read as a small upper left pulmonary edema vs early pneumonia.  Last Echo 1/2022 EF 61-65 % Continue to monitor  Procal slightly improved, CRP improved On Linezolid and meropenum      • PVD (peripheral vascular disease) ([facility])    • SIRS (systemic inflammatory response syndrome) ([facility]) 01/09/2022    Admission  - WBC 17.78   -   - RR 16 - 1/10: VSS/wnl - CXR - Mild pulm edema - Blood cultures no growth at 24 hours  - Procalcitonin 0.29 - UA : glucose 1000, negative Leucocytes/nitrate - Empiric Zosyn and Vancomcyin    • Sleep apnea    • Substance abuse ([facility])    • Vitamin D deficiency      Past Surgical History:   Procedure Laterality Date   • CARDIAC CATHETERIZATION N/A 07/14/2020   • CARDIAC CATHETERIZATION N/A 04/23/2021    Procedure: Left Heart Cath;  Surgeon: Melba Romo MD;  Location: [facility] CATH INVASIVE LOCATION;  Service: Cardiology;  Laterality: N/A;   • CARDIAC CATHETERIZATION N/A 04/30/2021    Procedure: Percutaneous Coronary Intervention;  Surgeon: Russell Voss MD;  Location: [facility] CATH INVASIVE LOCATION;  Service: Cardiovascular;  Laterality: N/A;   • CARDIAC CATHETERIZATION N/A 04/30/2021    Procedure: Stent NIKKI coronary;  Surgeon: Russell Voss MD;  Location: [facility] CATH INVASIVE LOCATION;  Service: Cardiovascular;  Laterality: N/A;   • CARDIAC CATHETERIZATION Left 11/13/2021    Procedure: Left Heart Cath;  Surgeon: Niall Rios MD;  Location: [facility] CATH INVASIVE LOCATION;  Service: Cardiology;  Laterality: Left;   • CAROTID STENT Left    • COLONOSCOPY     • COLONOSCOPY N/A 05/14/2021    Procedure: COLONOSCOPY;  Surgeon: Mingo Duarte MD;   Location: [facility] ENDOSCOPY;  Service: Gastroenterology;  Laterality: N/A;   • CORONARY ARTERY BYPASS GRAFT N/A 2013    CABG X 3   • CYSTOSCOPY BLADDER STONE LITHOTRIPSY Bilateral    • ENDOSCOPY N/A 04/12/2021    Procedure: ESOPHAGOGASTRODUODENOSCOPY;  Surgeon: Mingo Duarte MD;  Location: [facility] ENDOSCOPY;  Service: Gastroenterology;  Laterality: N/A;   • ENDOSCOPY N/A 05/14/2021    Procedure: ESOPHAGOGASTRODUODENOSCOPY;  Surgeon: Mingo Duarte MD;  Location: [facility] ENDOSCOPY;  Service: Gastroenterology;  Laterality: N/A;   • ENDOSCOPY N/A 01/28/2022    Procedure: ESOPHAGOGASTRODUODENOSCOPY;  Surgeon: Mingo Duarte MD;  Location: [facility] ENDOSCOPY;  Service: Gastroenterology;  Laterality: N/A;   • HYSTERECTOMY     • INTERVENTIONAL RADIOLOGY PROCEDURE N/A 10/21/2021    Procedure: tunneled central venous catheter placement;  Surgeon: Donnie Robles MD;  Location: [facility] INVASIVE LOCATION;  Service: Interventional Radiology;  Laterality: N/A;   • INTERVENTIONAL RADIOLOGY PROCEDURE N/A 01/27/2022    Procedure: tunneled central venous catheter placement;  Surgeon: Donnie Robles MD;  Location: [facility] INVASIVE LOCATION;  Service: Interventional Radiology;  Laterality: N/A;   • LAPAROSCOPIC TUBAL LIGATION     • TUNNELED VENOUS CATHETER PLACEMENT        General Information     Row Name 12/21/22 0720          OT Time and Intention    Document Type therapy note (daily note)  -BB     Mode of Treatment individual therapy;occupational therapy  -BB     Row Name 12/21/22 0720          General Information    Patient Profile Reviewed yes  -BB     Existing Precautions/Restrictions fall  -BB     Row Name 12/21/22 0720          Cognition    Orientation Status (Cognition) oriented to;person;place;situation  -BB     Row Name 12/21/22 0720          Safety Issues, Functional Mobility    Impairments Affecting Function (Mobility) endurance/activity tolerance;pain;strength;range of motion (ROM)  -BB            User Key  (r) = Recorded By, (t) = Taken By, (c) = Cosigned By    Initials Name Provider Type    Nissa Guillermo COTA Occupational Therapist Assistant                 Mobility/ADL's     Row Name 12/21/22 0720          Bed Mobility    Bed Mobility supine-sit;sit-supine;scooting/bridging;rolling left;rolling right  -BB     Row Name 12/21/22 0720          Transfers    Transfers sit-stand transfer;stand-sit transfer  -BB     Row Name 12/21/22 0720          Bed-Chair Transfer    Bed-Chair [place] (Transfers) not tested  -BB     Row Name 12/21/22 0720          Self-Feeding Assessment/Training    [place] Level (Feeding) finger foods;liquids to mouth;prepare tray/open items;scoop food and bring to mouth;contact guard assist  -BB     Position (Self-Feeding) sitting up in bed  -BB           User Key  (r) = Recorded By, (t) = Taken By, (c) = Cosigned By    Initials Name Provider Type    Nissa Guillermo COTA Occupational Therapist Assistant               Obj/Interventions     Row Name 12/21/22 0720          Range of Motion Comprehensive    General Range of Motion other (see comments)  -BB     Row Name 12/21/22 0720          Strength Comprehensive (MMT)    General Manual Muscle Testing (MMT) Assessment other (see comments)  -BB           User Key  (r) = Recorded By, (t) = Taken By, (c) = Cosigned By    Initials Name Provider Type    Nissa Guillermo COTA Occupational Therapist Assistant               Goals/Plan     Row Name 12/21/22 0720          Transfer Goal 1 (OT)    Activity/Assistive Device (Transfer Goal 1, OT) bed-to-chair/chair-to-bed;commode, bedside without drop arms;commode, bedside with drop arms;walker, rolling  -BB     [place] Level/Cues Needed (Transfer Goal 1, OT) minimum assist (75% or more patient effort)  -BB     Time Frame (Transfer Goal 1, OT) long term goal (LTG);by discharge  -BB     Progress/Outcome (Transfer Goal 1, OT) goal not met  -BB     Row Name  12/21/22 0720          Bathing Goal 1 (OT)    Activity/Device (Bathing Goal 1, OT) upper body bathing  -BB     [place] Level/Cues Needed (Bathing Goal 1, OT) standby assist  -BB     Time Frame (Bathing Goal 1, OT) long term goal (LTG);by discharge  -BB     Progress/Outcomes (Bathing Goal 1, OT) goal not met  -BB     Row Name 12/21/22 0720          Dressing Goal 1 (OT)    Activity/Device (Dressing Goal 1, OT) upper body dressing  -BB     [place]/Cues Needed (Dressing Goal 1, OT) standby assist  -BB     Time Frame (Dressing Goal 1, OT) long term goal (LTG);by discharge  -BB     Progress/Outcome (Dressing Goal 1, OT) goal not met  -BB     Row Name 12/21/22 0720          Grooming Goal 1 (OT)    Activity/Device (Grooming Goal 1, OT) grooming skills, all  -BB     [place] (Grooming Goal 1, OT) standby assist  -BB     Time Frame (Grooming Goal 1, OT) long term goal (LTG);by discharge  -BB     Progress/Outcome (Grooming Goal 1, OT) goal not met  -BB     Row Name 12/21/22 0720          Self-Feeding Goal 1 (OT)    Activity/Device (Self-Feeding Goal 1, OT) self-feeding skills, all  -BB     [place] Level/Cues Needed (Self-Feeding Goal 1, OT) independent  -BB     Time Frame (Self-Feeding Goal 1, OT) long term goal (LTG);by discharge  -BB     Progress/Outcomes (Self-Feeding Goal 1, OT) goal not met  -BB           User Key  (r) = Recorded By, (t) = Taken By, (c) = Cosigned By    Initials Name Provider Type    BB Nissa Bey COTA Occupational Therapist Assistant               Clinical Impression     Row Name 12/21/22 0720          Pain Assessment    Pretreatment Pain Rating 10/10  -BB     Posttreatment Pain Rating 10/10  ending pain not assessed but pt appears much more comfortable.  -BB     Pain Location - buttock  -BB     Pain Intervention(s) Distraction  -BB     Row Name 12/21/22 0720          Pain Scale: FACES Pre/Post-Treatment    Pain: FACES Scale, Pretreatment 6-->hurts even more  -     Row Name  12/21/22 0720          Plan of Care Review    Plan of Care Reviewed With patient  -BB     Progress improving  -BB     Outcome Evaluation Pt agrees to OT this am. Pt's breakfast tray arrived and is Min A for setting tray up and CGA for self feeding and drinking tasks. Pt to have dialysis this morning and OT tx ended. Continue OT POC  -BB     Row Name 12/21/22 0720          Therapy Assessment/Plan (OT)    Rehab Potential (OT) good, to achieve stated therapy goals  -BB     Criteria for Skilled Therapeutic Interventions Met (OT) yes;meets criteria;skilled treatment is necessary  -BB     Therapy Frequency (OT) daily  -BB     Row Name 12/21/22 0720          Vital Signs    Pretreatment Heart Rate (beats/min) 83  -BB     Pre SpO2 (%) 97  -BB     O2 Delivery Pre Treatment supplemental O2  -BB     Pre Patient Position Supine  -BB     Row Name 12/21/22 0720          Positioning and Restraints    Pre-Treatment Position in bed  -BB     Post Treatment Position bed  -BB     In Bed fowlers;call light within reach;encouraged to call for assist;exit alarm on  -BB           User Key  (r) = Recorded By, (t) = Taken By, (c) = Cosigned By    Initials Name Provider Type    BB Nissa Bey COTA Occupational Therapist Assistant               Outcome Measures     Row Name 12/21/22 0720          How much help from another is currently needed...    Putting on and taking off regular lower body clothing? 1  -BB     Bathing (including washing, rinsing, and drying) 1  -BB     Toileting (which includes using toilet bed pan or urinal) 1  -BB     Putting on and taking off regular upper body clothing 1  -BB     Taking care of personal grooming (such as brushing teeth) 2  -BB     Eating meals 3  -BB     AM-PAC 6 Clicks Score (OT) 9  -BB     Row Name 12/21/22 1206          How much help from another person do you currently need...    Turning from your back to your side while in flat bed without using bedrails? 3  -HENRIQUE     Moving from lying on  back to sitting on the side of a flat bed without bedrails? 2  -HENRIQUE     Moving to and from a bed to a chair (including a wheelchair)? 2  -HENRIQUE     Standing up from a chair using your arms (e.g., wheelchair, bedside chair)? 2  -HENRIQUE     Climbing 3-5 steps with a railing? 1  -HENRIQUE     To walk in hospital room? 1  -HENRIQUE     AM-PAC 6 Clicks Score (PT) 11  -HENRIQUE     Highest level of mobility 4 --> Transferred to chair/commode  -HENRIQUE     Row Name 12/21/22 1206          Functional Assessment    Outcome Measure Options AM-PAC 6 Clicks Basic Mobility (PT)  -HENRIQUE           User Key  (r) = Recorded By, (t) = Taken By, (c) = Cosigned By    Initials Name Provider Type    HENRIQUE Oliver Bennett, RENETTA Physical Therapist Assistant    Nissa Guillermo COTA Occupational Therapist Assistant                Occupational Therapy Education     Title: PT OT SLP Therapies (In Progress)     Topic: Occupational Therapy (In Progress)     Point: ADL training (In Progress)     Description:   Instruct learner(s) on proper safety adaptation and remediation techniques during self care or transfers.   Instruct in proper use of assistive devices.              Learning Progress Summary           Patient Acceptance, E, NR by JO at 12/21/2022 1431    Acceptance, E,TB, NR by  at 12/20/2022 0919    Comment: OT POC, Role of OT, transfer training                   Point: Home exercise program (Not Started)     Description:   Instruct learner(s) on appropriate technique for monitoring, assisting and/or progressing therapeutic exercises/activities.              Learner Progress:  Not documented in this visit.          Point: Precautions (In Progress)     Description:   Instruct learner(s) on prescribed precautions during self-care and functional transfers.              Learning Progress Summary           Patient Acceptance, E,TB, NR by  at 12/20/2022 0919    Comment: OT POC, Role of OT, transfer training                   Point: Body mechanics (In Progress)      Description:   Instruct learner(s) on proper positioning and spine alignment during self-care, functional mobility activities and/or exercises.              Learning Progress Summary           Patient Acceptance, E,TB, NR by  at 12/20/2022 0919    Comment: OT POC, Role of OT, transfer training                               User Key     Initials Effective Dates Name Provider Type Discipline     06/16/21 -  Nissa Bey COTA Occupational Therapist Assistant OT     08/11/22 -  Deanna Quiles OT Occupational Therapist OT              OT Recommendation and Plan  Therapy Frequency (OT): daily  Plan of Care Review  Plan of Care Reviewed With: patient  Progress: improving  Outcome Evaluation: Pt agrees to OT this am. Pt's breakfast tray arrived and is Min A for setting tray up and CGA for self feeding and drinking tasks. Pt to have dialysis this morning and OT tx ended. Continue OT POC     Time Calculation:    Time Calculation- OT     Row Name 12/21/22 1431             Time Calculation- OT    OT Start Time 0720  -BB      OT Stop Time 0745  -BB      OT Time Calculation (min) 25 min  -BB      Total Timed Code Minutes- OT 25 minute(s)  -BB      OT Received On 12/21/22  -BB         Timed Charges    08590 - OT Self Care/Mgmt Minutes 25  -BB         Total Minutes    Timed Charges Total Minutes 25  -BB       Total Minutes 25  -BB            User Key  (r) = Recorded By, (t) = Taken By, (c) = Cosigned By    Initials Name Provider Type    BB Nissa Bey COTA Occupational Therapist Assistant              Therapy Charges for Today     Code Description Service Date Service Provider Modifiers Qty    [id number] HC OT SELF CARE/MGMT/TRAIN EA 15 MIN 12/21/2022 Nissa Bey COTA GO 2               ISHAN Mcgovern  12/21/2022

## 2022-12-21 NOTE — PROGRESS NOTES
[facility] Medicine Services  INPATIENT PROGRESS NOTE    Length of Stay: 6  Date of Admission: 12/15/2022  Primary Care Physician: Kiersten Wilson APRN    Subjective   Chief Complaint: Left foot pain secondary to necrosis  HPI: Much more oriented today.  Mental status much better.    As of today, 12/21/22  Review of Systems   Unable to perform ROS: Mental status change        All pertinent negatives and positives are as above. All other systems have been reviewed and are negative unless otherwise stated.    Objective    Temp:  [97 °F (36.1 °C)-98.3 °F (36.8 °C)] 98.3 °F (36.8 °C)  Heart Rate:  [] 94  Resp:  [16-20] 18  BP: (115-170)/(54-88) 119/88    AM-PAC 6 Clicks Score (PT): 11 (12/21/22 1206)    As of today, 12/21/22  Physical Exam  Vitals reviewed.   Constitutional:       Appearance: She is well-developed.   HENT:      Head: Normocephalic and atraumatic.   Eyes:      Pupils: Pupils are equal, round, and reactive to light.   Cardiovascular:      Rate and Rhythm: Normal rate and regular rhythm.      Heart sounds: Normal heart sounds. No murmur heard.    No friction rub. No gallop.   Pulmonary:      Effort: Pulmonary effort is normal. No respiratory distress.      Breath sounds: Normal breath sounds. No wheezing or rales.   Chest:      Chest wall: No tenderness.   Abdominal:      General: Bowel sounds are normal. There is no distension.      Palpations: Abdomen is soft.      Tenderness: There is no abdominal tenderness. There is no guarding.   Musculoskeletal:      Cervical back: Normal range of motion and neck supple.      Comments: Left BKA.  Dressing clean dry and intact.   Skin:     General: Skin is warm and dry.   Neurological:      Mental Status: She is alert.      Comments: Much more interactive today.  More oriented.   Psychiatric:         Speech: Speech normal.         Behavior: Behavior normal.      Comments: Much more interactive today.            Results Review:  I have reviewed the labs, radiology results, and diagnostic studies.    Laboratory Data:   Results from last 7 days   Lab Units 12/21/22  0641 12/20/22  0522 12/19/22  0600   SODIUM mmol/L 132* 135* 133*   POTASSIUM mmol/L 4.5 5.0 4.3   CHLORIDE mmol/L 91* 92* 95*   CO2 mmol/L 21.0* 22.0 22.0   BUN mg/dL 68* 47* 57*   CREATININE mg/dL 6.07* 4.43* 4.98*   GLUCOSE mg/dL 270* 244* 201*   CALCIUM mg/dL 9.8 10.0 9.9   BILIRUBIN mg/dL 0.3 0.4 0.4   ALK PHOS U/L 203* 241* 252*   ALT (SGPT) U/L <5 <5 <5   AST (SGOT) U/L 9 11 16   ANION GAP mmol/L 20.0* 21.0* 16.0*     Estimated Creatinine Clearance: 10.9 mL/min (A) (by C-G formula based on SCr of 6.07 mg/dL (H)).  Results from last 7 days   Lab Units 12/16/22  0259   MAGNESIUM mg/dL 1.9   PHOSPHORUS mg/dL 8.9*         Results from last 7 days   Lab Units 12/21/22  0641 12/20/22  0522 12/19/22  0600 12/18/22  0825 12/18/22  0549   WBC 10*3/mm3 12.79* 13.52* 9.77 10.68 10.48   HEMOGLOBIN g/dL 12.5 13.1 12.3 12.7 12.8   HEMATOCRIT % 40.3 42.6 38.7 41.4 42.2   PLATELETS 10*3/mm3 251 281 259 253 261           Culture Data:   No results found for: BLOODCX  No results found for: URINECX  No results found for: RESPCX  No results found for: WOUNDCX  No results found for: STOOLCX  No components found for: BODYFLD    Radiology Data:   Imaging Results (Last 24 Hours)     ** No results found for the last 24 hours. **          I have reviewed the patient's current medications.     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  Left foot necrosis: Status post BKA.  Continue antibiotics.  2.  End-stage renal disease: Continue hemodialysis.  Appreciate nephrology help.  3.  Diabetes mellitus: Continue current.  4.  Hypertension: Continue losartan.  5.  Altered was: Improving.  Likely anesthesia reaction.  Patient's sister states that this is common for her.  6.  Chronic pain: Continue current pain management for now.  7.  DVT prophylaxis: Heparin    The patient was evaluated during the global COVID-19 pandemic, and the diagnosis was suspected/considered upon their initial presentation.  Evaluation, treatment, and testing were consistent with current guidelines for patients who present with complaints or symptoms that may be related to COVID-19.    I confirmed that the patient's Advance Care Plan is present, code status is documented, or surrogate decision maker is listed in the patient's medical record.       Discharge Planning: I expect patient to be discharged to skilled facility versus LTAC in 2-3 days.        This document has been electronically signed by Jef Ramires MD on December 21, 2022 15:41 CST

## 2022-12-21 NOTE — PROGRESS NOTES
ORTHOPEDIC PROGRESS NOTE:    Name:  Yamileth Slater  Date:    2022  Date of admission:  12/15/2022    Post op day:  5 Days Post-Op  Procedure:    Procedure(s) (LRB):  AMPUTATION BELOW KNEE, LEFT (Left)    Subjective:  Only complaint is that she wants the restraints removed.  She is alert today  No fever or chills      Vitals:     Vitals:    22 0855   BP: 153/78   Pulse: 82   Resp: 20   Temp:    SpO2:       Temp (24hrs), Av.4 °F (36.3 °C), Min:97 °F (36.1 °C), Max:97.9 °F (36.6 °C)      Exam:  Awake and alert  Oriented to person, place, and situation  Dressing left BKA site intact.    Results from last 7 days   Lab Units 22  0641 22  0522 22  0600   CRP mg/dL 7.81* 10.06* 12.95*     Results from last 7 days   Lab Units 22  0641 22  0522 22  0600   WBC 10*3/mm3 12.79* 13.52* 9.77   HEMOGLOBIN g/dL 12.5 13.1 12.3   HEMATOCRIT % 40.3 42.6 38.7   PLATELETS 10*3/mm3 251 281 259         ASSESSMENT:  Active Hospital Problems    Diagnosis  POA   • **Venous insufficiency [I87.2]  Yes   • Left foot pain [M79.672]  Yes     Added automatically from request for surgery [id number]     • 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 ([facility]) [L97.524]  Yes     Added automatically from request for surgery [id number]     • On home oxygen therapy [Z99.81]  Not Applicable     Added automatically from request for surgery [id number]     • ESRD on hemodialysis ([facility]) [N18.6, Z99.2]  Not Applicable     -Receives hemodialysis MWF at [facility] in [place]  Missed dialysis per family prior to arrival   Nephrology consulted appreciated recs          • MIKE and COPD overlap syndrome ([facility]) [G47.33, J44.9]  Yes     -Home Trilogy/CPAP continue   -Home 3L oxygen dependent. Maintain strict Sats between 88-92%       • Pulmonary hypertension ([facility]) [I27.20]  Yes   • Coronary artery disease involving native coronary artery of native heart without angina pectoris [I25.10]  Yes     - S/P CABG, Bilateral  carotid artery stenosis w/ 2 stents on left side,  PAD (peripheral artery disease) with stent in right upper leg  - Cardiology on board       • Morbid obesity with BMI of 50.0-59.9, adult ([facility]) [E66.01, Z68.43]  Not Applicable     Body mass index is 56.52 kg/m².  - Consistent carb, cardiac, renal diet     • Carotid artery disease ([facility]) [I77.9]  Yes     -Status post stenting  -Continue home medications     • PAD (peripheral artery disease) ([facility]) [I73.9]  Yes     · Continue ASA 81mg, Clopidogrel 75mg, Atorvastatin 40mg           PLAN:    Slowly improving  Mental status much better today  Continue dressing changes left LE  Continue medical management  DVT prophylaxis  Mobility as tolerated  Disposition will need to consider mobility issues as well as dialysis.    12/21/22 at 09:05 CST by Huy White MD

## 2022-12-21 NOTE — THERAPY TREATMENT NOTE
Acute Care - Physical Therapy Treatment Note  [facility]     Patient Name: Yaimleth Slater  : 1960  MRN: [id number]  Today's Date: 2022      Visit Dx:     ICD-10-CM ICD-9-CM   1. Venous insufficiency  I87.2 459.81   2. Pulmonary hypertension ([facility])  I27.20 416.8   3. Left foot pain  M79.672 729.5   4. PAD (peripheral artery disease) ([facility])  I73.9 443.9   5. On home oxygen therapy  Z99.81 V46.2   6. CKD (chronic kidney disease) requiring chronic dialysis ([facility])  N18.6 585.6    Z99.2 V45.11   7. Bilateral carotid artery stenosis  I65.23 433.10     433.30   8. Morbid obesity with BMI of 45.0-49.9, adult ([facility])  E66.01 278.01    Z68.42 V85.42   9. 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 ([facility])  L97.524 707.15     730.17   10. Coronary artery disease involving native coronary artery of native heart without angina pectoris  I25.10 414.01   11. MIKE and COPD overlap syndrome ([facility])  G47.33 327.23    J44.9 496   12. Impaired physical mobility  Z74.09 781.99   13. Impaired mobility and ADLs  Z74.09 V49.89    Z78.9      Patient Active Problem List   Diagnosis   • Chronic midline low back pain without sciatica   • Vitamin D deficiency   • Tobacco dependence syndrome   • Shoulder joint pain   • Morbid obesity with BMI of 50.0-59.9, adult ([facility])   • Mixed hyperlipidemia   • Kidney stone   • History of colon polyps   • GERD (gastroesophageal reflux disease)   • Excoriated eczema   • Hypertension   • COPD (chronic obstructive pulmonary disease) ([facility])   • Chronic folliculitis   • Coronary artery disease involving native coronary artery of native heart without angina pectoris   • 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   • Hypothyroidism   • Carotid artery disease ([facility])   • [place] abuse   • PAD (peripheral artery disease) ([facility])   • Venous insufficiency   • LAFB (left anterior fascicular block)   • Pulmonary hypertension ([facility])   • Left hip pain   • Neck pain   • MIKE and COPD overlap  syndrome ([facility])   • Pneumonia due to COVID-19 virus   • Hyperkalemia   • Cutaneous candidiasis   • Community acquired pneumonia of right middle lobe of lung   • MRSA infection   • Esophageal dysphagia   • Monilial esophagitis ([facility])   • NSTEMI, initial episode of care ([facility])   • Cellulitis of foot associated with diabetes mellitus ([facility])   • Urinary tract infection due to Proteus   • History of insertion of tunneled central venous catheter (CVC) with port   • Anemia due to chronic kidney disease, on chronic dialysis ([facility])   • Pneumonitis   • Personal history of noncompliance with medical treatment, presenting hazards to health   • Type 2 diabetes mellitus with circulatory disorder ([facility])   • Physical deconditioning   • ESRD on hemodialysis ([facility])   • DVT prophylaxis   • Anemia   • Ventilator associated pneumonia ([facility])   • Positive fecal occult blood test   • Yeast UTI   • Gangrene of both feet ([facility])   • Left foot pain   • 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 ([facility])   • On home oxygen therapy     Past Medical History:   Diagnosis Date   • Acute blood loss anemia 04/16/2017    Likely due to gastric oozing at this time. - Dr. Duarte (GI) was consulted and has now signed off, will follow up outpatient - pill colonoscopy showed AVMs - continue to monitor   • Acute cystitis with hematuria 03/31/2021 1/13: IV Rocephin 1 gm q 24 1/14 : transitioned  to omnicef 300mg. Urine cultures resulted and did not show growth. Omnicef discontinued as patient is asymptomatic   • Altered mental status 01/09/2022    - AMS on presentation - initial ABG pH 7.3, CO2 34 - Procal 0.29 - UA negative for acute cysitits -CTA head wnl  - Empiric Zosyn and Vancomycin -Lactate 2.5 on admission  - blood cultures no growth at 24 hours     • Anxiety    • CAD (coronary artery disease) 04/24/2021    S/P 3 stents 5/1/2021 for [facility] Continue ASA 81mg & Clopidogrel 75mg Continue Atorvastatin 40mg   • Carotid artery stenosis    • CHF (congestive heart  failure) ([facility])    • Chronic obstructive lung disease ([facility])    • CKD (chronic kidney disease) stage 4, GFR 15-29 ml/min ([facility])    • CKD (chronic kidney disease), symptom management only, stage 5 ([facility]) 10/05/2020    Results from last 7 days Lab Units 12/15/21 0548 12/14/21 1323 12/14/21 0916 CREATININE mg/dL 3.92* 3.21* 3.32*  Baseline creatinine 2-3 GFR 13-25 GFR 15 Dialysis MWF, sees Dr. Lauren Nephrology consult,, appreciate recommendations Continue Bumex 1mg bid daily Holding Bumex 2mg 4 times a week   • Colonic polyp    • Coronary arteriosclerosis    • Diabetes mellitus ([facility])    • Diabetic neuropathy ([facility])    • Ear pain, right 10/18/2021    - canal trauma due to patient scratching and DMT2 - added cortisporin ear drops   • Elevated troponin 10/12/2021    -most likely from CKD -Trending down -Neg chest pain   • Generalized abdominal pain 07/01/2022    Could be due to initiation of tube feeds vs dyspepsia vs abdominal cramps related to no PO intake due to intubation vs constipation Continue current laxative regiment  If no bowel movement by this afternoon will consider enema   • GERD (gastroesophageal reflux disease)    • GI bleed 05/13/2021    - GI will follow up outpatient - Protonix 40mg daily - Avoid medical DVT prophy and use mechanical at this time instead. - Continue to monitor - pill colonoscopy results showed AVMs   • History of transfusion    • Hypercholesterolemia    • Hyperosmolar hyperglycemic state (HHS) ([facility]) 06/25/2022    Serum glucose 605 on admission  Anion gap 16 PH 7.37 Bicarb 27.9 Continue fluids  Insulin drip with HHS protocol  Anion gap closed around 10 AM, received one dose of Levamir subq, will stop insulin drip after 2 hrs     • Hypertension    • Hypokalemia 05/27/2022    Will replace as needed. Will be cautious in the setting of ESRD to avoid need for emergency dialysis   Monitor Qtc intervals on EKG     • Hypomagnesemia 06/27/2021    Monitor and replace   • Morbid obesity ([facility])    •  Nephrolithiasis    • On mechanically assisted ventilation ([facility]) 06/26/2022    Vent management and sedation orders placed.  - [facility] intensivist group consulted for vent management appreciate recommendations  - plan to extubate today     • Peripheral vascular disease ([facility])    • Pleural effusion on right 06/26/2022    CXR on 6/30/22 read as a small upper left pulmonary edema vs early pneumonia.  Last Echo 1/2022 EF 61-65 % Continue to monitor  Procal slightly improved, CRP improved On Linezolid and meropenum      • PVD (peripheral vascular disease) ([facility])    • SIRS (systemic inflammatory response syndrome) ([facility]) 01/09/2022    Admission  - WBC 17.78   -   - RR 16 - 1/10: VSS/wnl - CXR - Mild pulm edema - Blood cultures no growth at 24 hours  - Procalcitonin 0.29 - UA : glucose 1000, negative Leucocytes/nitrate - Empiric Zosyn and Vancomcyin    • Sleep apnea    • Substance abuse ([facility])    • Vitamin D deficiency      Past Surgical History:   Procedure Laterality Date   • CARDIAC CATHETERIZATION N/A 07/14/2020   • CARDIAC CATHETERIZATION N/A 04/23/2021    Procedure: Left Heart Cath;  Surgeon: Melba Romo MD;  Location: [facility] CATH INVASIVE LOCATION;  Service: Cardiology;  Laterality: N/A;   • CARDIAC CATHETERIZATION N/A 04/30/2021    Procedure: Percutaneous Coronary Intervention;  Surgeon: Russell Voss MD;  Location: [facility] CATH INVASIVE LOCATION;  Service: Cardiovascular;  Laterality: N/A;   • CARDIAC CATHETERIZATION N/A 04/30/2021    Procedure: Stent NIKKI coronary;  Surgeon: Russell Voss MD;  Location: [facility] CATH INVASIVE LOCATION;  Service: Cardiovascular;  Laterality: N/A;   • CARDIAC CATHETERIZATION Left 11/13/2021    Procedure: Left Heart Cath;  Surgeon: Niall Rios MD;  Location: [facility] CATH INVASIVE LOCATION;  Service: Cardiology;  Laterality: Left;   • CAROTID STENT Left    • COLONOSCOPY     • COLONOSCOPY N/A 05/14/2021    Procedure: COLONOSCOPY;  Surgeon: Felicia  MD Mingo;  Location: [facility] ENDOSCOPY;  Service: Gastroenterology;  Laterality: N/A;   • CORONARY ARTERY BYPASS GRAFT N/A 2013    CABG X 3   • CYSTOSCOPY BLADDER STONE LITHOTRIPSY Bilateral    • ENDOSCOPY N/A 04/12/2021    Procedure: ESOPHAGOGASTRODUODENOSCOPY;  Surgeon: [place] Duarte MD;  Location: [facility] ENDOSCOPY;  Service: Gastroenterology;  Laterality: N/A;   • ENDOSCOPY N/A 05/14/2021    Procedure: ESOPHAGOGASTRODUODENOSCOPY;  Surgeon: [place] Duarte MD;  Location: [facility] ENDOSCOPY;  Service: Gastroenterology;  Laterality: N/A;   • ENDOSCOPY N/A 01/28/2022    Procedure: ESOPHAGOGASTRODUODENOSCOPY;  Surgeon: [place] Duarte MD;  Location: [facility] ENDOSCOPY;  Service: Gastroenterology;  Laterality: N/A;   • HYSTERECTOMY     • INTERVENTIONAL RADIOLOGY PROCEDURE N/A 10/21/2021    Procedure: tunneled central venous catheter placement;  Surgeon: Donnie Robles MD;  Location: [facility] ANGIO INVASIVE LOCATION;  Service: Interventional Radiology;  Laterality: N/A;   • INTERVENTIONAL RADIOLOGY PROCEDURE N/A 01/27/2022    Procedure: tunneled central venous catheter placement;  Surgeon: Donnie Robles MD;  Location: [facility] ANGIO INVASIVE LOCATION;  Service: Interventional Radiology;  Laterality: N/A;   • LAPAROSCOPIC TUBAL LIGATION     • TUNNELED VENOUS CATHETER PLACEMENT       PT Assessment (last 12 hours)     PT Evaluation and Treatment     Row Name 12/21/22 1206          Physical Therapy Time and Intention    Document Type therapy note (daily note)  -HENRIQUE     Mode of Treatment individual therapy;physical therapy  -HENRIQUE     Patient Effort good  -HENRIQUE     Row Name 12/21/22 1206          General Information    Patient Profile Reviewed yes  -HENRIQUE     Existing Precautions/Restrictions fall  -HENRIQUE     Row Name 12/21/22 1206          Pain    Pretreatment Pain Rating 10/10  -HENRIQUE     Posttreatment Pain Rating --  ending pain not assessed but pt appears much more comfortable.  -HENRIQUE     Pain Location - buttock   -     Pain Intervention(s) Repositioned  -     Row Name 12/21/22 1206          Cognition    Orientation Status (Cognition) oriented to;person;place;situation  -     Personal Safety Interventions fall prevention program maintained;gait belt;muscle strengthening facilitated;nonskid shoes/slippers when out of bed;supervised activity  -     Row Name 12/21/22 1206          Bed Mobility    Bed Mobility supine-sit;sit-supine;scooting/bridging;rolling left;rolling right  -     Rolling Left [place] (Bed Mobility) minimum assist (75% patient effort)  -     Rolling Right [place] (Bed Mobility) minimum assist (75% patient effort)  -     Scooting/Bridging [place] (Bed Mobility) standby assist;verbal cues  -     Supine-Sit [place] (Bed Mobility) moderate assist (50% patient effort);2 person assist  -     Sit-Supine [place] (Bed Mobility) moderate assist (50% patient effort);2 person assist  -     Comment, (Bed Mobility) pt maintains sitting balance with CGA-CGA.  -     Row Name 12/21/22 1206          Transfers    Transfers sit-stand transfer;stand-sit transfer  -     Comment, (Transfers) pt static stood x 80 sec. min-mod A to standin balance  -     Row Name 12/21/22 1206          Bed-Chair Transfer    Bed-Chair [place] (Transfers) --  -     Row Name 12/21/22 1206          Sit-Stand Transfer    Sit-Stand [place] (Transfers) moderate assist (50% patient effort);2 person assist  -     Assistive Device (Sit-Stand Transfers) walker, front-wheeled  -     Row Name 12/21/22 1206          Stand-Sit Transfer    Stand-Sit [place] (Transfers) moderate assist (50% patient effort)  -     Assistive Device (Stand-Sit Transfers) walker, front-wheeled  -     Row Name 12/21/22 1206          Gait/Stairs (Locomotion)    [place] Level (Gait) --  -     Row Name 12/21/22 1206          Safety Issues, Functional Mobility    Impairments Affecting Function (Mobility) --  -      Row Name             Wound 08/09/22 1021 Right anterior great toe Incision    Wound - Properties Group Placement Date: 08/09/22  -SC Placement Time: 1021  -SC Side: Right  -SC Orientation: anterior  -SC Location: great toe  -SC Primary Wound Type: Incision  -MG    Retired Wound - Properties Group Placement Date: 08/09/22  -SC Placement Time: 1021  -SC Side: Right  -SC Orientation: anterior  -SC Location: great toe  -SC Primary Wound Type: Incision  -MG    Retired Wound - Properties Group Date first assessed: 08/09/22  -SC Time first assessed: 1021  -SC Side: Right  -SC Location: great toe  -SC Primary Wound Type: Incision  -MG    Row Name             Wound 12/15/22 1609 Left posterior    Wound - Properties Group Placement Date: 12/15/22  -LR Placement Time: 1609  -LR Side: Left  -LR Orientation: posterior  -LR    Retired Wound - Properties Group Placement Date: 12/15/22  -LR Placement Time: 1609  -LR Side: Left  -LR Orientation: posterior  -LR    Retired Wound - Properties Group Date first assessed: 12/15/22  -LR Time first assessed: 1609  -LR Side: Left  -LR    Row Name             Wound 12/15/22 1652 coccyx    Wound - Properties Group Placement Date: 12/15/22  -LR Placement Time: 1652  -LR Location: coccyx  -LR    Retired Wound - Properties Group Placement Date: 12/15/22  -LR Placement Time: 1652  -LR Location: coccyx  -LR    Retired Wound - Properties Group Date first assessed: 12/15/22  -LR Time first assessed: 1652  -LR Location: coccyx  -LR    Row Name             Wound 12/15/22 1728 Left anterior    Wound - Properties Group Placement Date: 12/15/22  -LR Placement Time: 1728  -LR Side: Left  -LR Orientation: anterior  -LR    Retired Wound - Properties Group Placement Date: 12/15/22  -LR Placement Time: 1728  -LR Side: Left  -LR Orientation: anterior  -LR    Retired Wound - Properties Group Date first assessed: 12/15/22  -LR Time first assessed: 1728  -LR Side: Left  -LR    Row Name             Wound  09/06/22 2356 Right anterior second toe Traumatic    Wound - Properties Group Placement Date: 09/06/22  -RL Placement Time: 2356  -RL Side: Right  -RL Orientation: anterior  -RL Location: second toe  -RL Primary Wound Type: Traumatic  -MG    Retired Wound - Properties Group Placement Date: 09/06/22  -RL Placement Time: 2356  -RL Side: Right  -RL Orientation: anterior  -RL Location: second toe  -RL Primary Wound Type: Traumatic  -MG    Retired Wound - Properties Group Date first assessed: 09/06/22  -RL Time first assessed: 2356  -RL Side: Right  -RL Location: second toe  -RL Primary Wound Type: Traumatic  -MG    Row Name             Wound 12/16/22 1647 Left lower leg Incision    Wound - Properties Group Placement Date: 12/16/22  -MJ Placement Time: 1647  -MJ Present on Hospital Admission: N  -MJ Side: Left  -MJ Orientation: lower  -MJ Location: leg  -MJ Primary Wound Type: Incision  -MJ    Retired Wound - Properties Group Placement Date: 12/16/22  -MJ Placement Time: 1647  -MJ Present on Hospital Admission: N  -MJ Side: Left  -MJ Orientation: lower  -MJ Location: leg  -MJ Primary Wound Type: Incision  -MJ    Retired Wound - Properties Group Date first assessed: 12/16/22  -MJ Time first assessed: 1647  -MJ Present on Hospital Admission: N  -MJ Side: Left  -MJ Location: leg  -MJ Primary Wound Type: Incision  -MJ    Row Name 12/21/22 1206          Vital Signs    Pre Systolic BP Rehab 104  -HENRIQUE     Pre Treatment Diastolic BP 69  -HENRIQUE     Post Systolic BP Rehab 98  -HENRIQUE     Post Treatment Diastolic BP 54  -HENRIQUE     Pretreatment Heart Rate (beats/min) 86  -HENRIQUE     Posttreatment Heart Rate (beats/min) 86  -HENRIQUE     Pre SpO2 (%) --  unable to obtain  -HENRIQUE     Pre Patient Position Supine  -HENRIQUE     Post Patient Position Supine  -HENRIQUE     Row Name 12/21/22 1206          Positioning and Restraints    Pre-Treatment Position in bed  -HENRIQUE     Post Treatment Position bed  -HENRIQUE     In Bed fowlers;call light within reach;encouraged to call for  assist;exit alarm on  all needs met  -     Row Name 12/21/22 1206          Therapy Assessment/Plan (PT)    Rehab Potential (PT) good, to achieve stated therapy goals  -     Criteria for Skilled Interventions Met (PT) yes;meets criteria;skilled treatment is necessary  -     Therapy Frequency (PT) daily  x2 if tolerated  -     Row Name 12/21/22 1206          Bed Mobility Goal 1 (PT)    Activity/Assistive Device (Bed Mobility Goal 1, PT) sit to supine;supine to sit  -     [place] Level/Cues Needed (Bed Mobility Goal 1, PT) minimum assist (75% or more patient effort);moderate assist (50-74% patient effort)  -     Time Frame (Bed Mobility Goal 1, PT) by discharge  -     Strategies/Barriers (Bed Mobility Goal 1, PT) co-morbidities  -     Progress/Outcomes (Bed Mobility Goal 1, PT) goal not met  -[facility] Name 12/21/22 1206          Transfer Goal 1 (PT)    Activity/Assistive Device (Transfer Goal 1, PT) sit-to-stand/stand-to-sit;bed-to-chair/chair-to-bed;wheelchair transfer  -     [place] Level/Cues Needed (Transfer Goal 1, PT) minimum assist (75% or more patient effort);moderate assist (50-74% patient effort)  -     Time Frame (Transfer Goal 1, PT) 2 weeks  -     Progress/Outcome (Transfer Goal 1, PT) goal not met  -     Row Name 12/21/22 1206          ROM Goal 1 (PT)    ROM Goal 1 (PT) Pt will tolerate bed in chair position 30 minutes progressing to OOB in chair with VSS  -     Time Frame (ROM Goal 1, PT) 2 weeks  -     Progress/Outcome (ROM Goal 1, PT) goal not met  -     Row Name 12/21/22 1206          Problem Specific Goal 1 (PT)    Problem Specific Goal 1 (PT) Pt will propel w/c 50ft or more with SBA with VSS  -     Time Frame (Problem Specific Goal 1, PT) 2 weeks;3 weeks  -     Progress/Outcome (Problem Specific Goal 1, PT) goal not met  -           User Key  (r) = Recorded By, (t) = Taken By, (c) = Cosigned By    Initials Name Provider Type    Sharon Franklin,  RN Registered Nurse    Tracy Shaw RN Registered Nurse    Oliver Mejía, PTA Physical Therapist Assistant    Lisha Zhao, RN Registered Nurse    Rabia Carty, RN Registered Nurse    Milly Landin, RN Registered Nurse                Physical Therapy Education     Title: PT OT SLP Therapies (In Progress)     Topic: Physical Therapy (In Progress)     Point: Mobility training (In Progress)     Learning Progress Summary           Patient Acceptance, E, NR by  at 12/21/2022 1321    Acceptance, E, NR by HENRIQUE at 12/20/2022 1322                   Point: Home exercise program (Not Started)     Learner Progress:  Not documented in this visit.          Point: Body mechanics (Not Started)     Learner Progress:  Not documented in this visit.          Point: Precautions (Not Started)     Learner Progress:  Not documented in this visit.                      User Key     Initials Effective Dates Name Provider Type Discipline     06/16/21 -  Oliver Bennett PTA Physical Therapist Assistant PT              PT Recommendation and Plan  Anticipated Discharge Disposition (PT): LTCH (long-term care [facility]), inpatient rehabilitation facility, extended care facility  Therapy Frequency (PT): daily (x2 if tolerated)  Plan of Care Reviewed With: patient  Progress: improving  Outcome Evaluation: pt agreeable to PT. pt is uncomfrtable and wanting to move. pt requires min A to roll R/L. mod A x2 for sit-sup-sit. pt maintains sitting balance with SBA-CGA. pt stood mod A x2 and able to maintain static standing x 80 sec with min-mod A and RW. no new goals met at this time. pt would continue to benefit from PT services.   Outcome Measures     Row Name 12/21/22 1206 12/20/22 1300          How much help from another person do you currently need...    Turning from your back to your side while in flat bed without using bedrails? 3  -HENRIQUE 2  -HENRIQUE     Moving from lying on back to sitting on the side of a flat bed without  bedrails? 2  -HENRIQUE 2  -HENRIQUE     Moving to and from a bed to a chair (including a wheelchair)? 2  -HENRIQUE 1  -HENRIQUE     Standing up from a chair using your arms (e.g., wheelchair, bedside chair)? 2  -HENRIQUE 1  -HENRIQUE     Climbing 3-5 steps with a railing? 1  -HENRIQUE 1  -HENRIQUE     To walk in hospital room? 1  -HENRIQUE 1  -HENRIQUE     AM-PAC 6 Clicks Score (PT) 11  -HENRIQUE 8  -HENRIQUE        Functional Assessment    Outcome Measure Options AM-PAC 6 Clicks Basic Mobility (PT)  -HENRIQUE AM-PAC 6 Clicks Basic Mobility (PT)  -HENRIQUE           User Key  (r) = Recorded By, (t) = Taken By, (c) = Cosigned By    Initials Name Provider Type    Oliver Mejía PTA Physical Therapist Assistant                 Time Calculation:    PT Charges     Row Name 12/21/22 1324             Time Calculation    Start Time 1206  -HENRIQUE      Stop Time 1237  -HENRIQUE      Time Calculation (min) 31 min  -HENRIQUE         Time Calculation- PT    Total Timed Code Minutes- PT 31 minute(s)  -HENRIQUE         Timed Charges    28669 - PT Therapeutic Activity Minutes 31  -HENRIQUE         Total Minutes    Timed Charges Total Minutes 31  -HENRIQUE       Total Minutes 31  -HENRIQUE            User Key  (r) = Recorded By, (t) = Taken By, (c) = Cosigned By    Initials Name Provider Type    Oliver Mejía PTA Physical Therapist Assistant              Therapy Charges for Today     Code Description Service Date Service Provider Modifiers Qty    15136490016 HC PT THER PROC EA 15 MIN 12/20/2022 Oliver Bennett PTA GP 2    72278411274 HC PT THERAPEUTIC ACT EA 15 MIN 12/21/2022 Oliver Bennett PTA GP 2          PT G-Codes  Outcome Measure Options: AM-PAC 6 Clicks Basic Mobility (PT)  AM-PAC 6 Clicks Score (PT): 11  AM-PAC 6 Clicks Score (OT): 8    Oliver Bennett PTA  12/21/2022

## 2022-12-21 NOTE — DISCHARGE PLACEMENT REQUEST
Yamileth Slater (62 y.o. Female)     Date of Birth   1960    Social Security Number       Address   [address] [place] [address]    Home Phone   [phone]    MRN   [id number]       Cheondoism   None    Marital Status   Legally                             Admission Date   12/15/22    Admission Type   Urgent    Admitting Provider   Nestor Richards MD    Attending Provider   Nestor Richards MD    Department, Room/Bed   [address], 412/1       Discharge Date       Discharge Disposition       Discharge Destination                               Attending Provider: Nestor Richards MD    Allergies: Adhesive Tape, Nsaids, Latex, Other, Wound Dressing Adhesive    Isolation: Contact   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), 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)   Code Status: CPR    Ht: 157.5 cm (62.01\")   Wt: 107 kg (235 lb 6.4 oz)    Admission Cmt: None   Principal Problem: Venous insufficiency [I87.2]                 Active Insurance as of 12/15/2022     Primary Coverage     Payor Plan Insurance Group Employer/Plan Group    ANTHEM MEDICARE REPLACEMENT ANTHEM MEDICARE ADVANTAGE [id number]     Payor Plan Address Payor Plan Phone Number Payor Plan Fax Number Effective Dates    [address] [phone]  1/1/2022 - None Entered    [facility] [address]       Subscriber Name Subscriber Birth Date Member ID       YAMILETH SLATER 1960 [id number]           Secondary Coverage     Payor Plan Insurance Group Employer/Plan Group    [place]      Payor Plan Address Payor Plan Phone Number Payor Plan Fax Number Effective Dates    [address] [phone]  6/28/2019 - None Entered    [facility] [id number]       Subscriber Name Subscriber Birth Date Member ID       YAMILETH SLATER 1960 [id number]                 Emergency Contacts      (Rel.) Home Phone Work Phone Mobile Phone    RAMONA KHAN (Sister) -- -- [phone]     Yamileth Chavez (Daughter) [phone] -- [phone]    Suzanne Rivera (Daughter) [phone] -- [phone]               History & Physical      Huy White MD at 12/16/22 1539          H&P updated. The patient was examined and the following changes are noted:  patient was noted to have hyperkalemia mostly because she skipped dialysis on Wednesday.  She responded to treatment and to dialysis today.     Vitals:    12/16/22 1500   BP: 93/66   Pulse: 82   Resp: 16   Temp:    SpO2: 97%       Allergies   Allergen Reactions   • Adhesive Tape Hives, Other (See Comments) and Rash   • Nsaids Hives   • Latex Other (See Comments) and Hives   • Other Itching     Bandaids, MRSA, SURECLOSE   • Wound Dressing Adhesive Hives and Rash       Prior to Admission medications    Medication Sig Start Date End Date Taking? Authorizing Provider   albuterol (PROVENTIL) (2.5 MG/3ML) 0.083% nebulizer solution Inhale the contents of 1 vial by nebulization Every 4 (Four) Hours As Needed for Wheezing. 10/28/21  Yes Haily Mcneil MD   albuterol sulfate  (90 Base) MCG/ACT inhaler Inhale 2 puffs Every 4 (Four) Hours As Needed for Wheezing. 3/26/21  Yes Nirmal Calvillo MD   atorvastatin (LIPITOR) 20 MG tablet Take 1 tablet by mouth every night at bedtime. 4/26/22  Yes Kit Brar MD   Cetirizine HCl 10 MG capsule Take 1 capsule by mouth Daily. 11/4/21  Yes Caio Thompson MD   clopidogrel (PLAVIX) 75 MG tablet Take 1 tablet by mouth Daily. 3/26/21  Yes Nirmal Calvillo MD   CYCLOBENZAPRINE HCL PO Take 5 mg by mouth 2 (Two) Times a Day. 10/28/22  Yes Kristie Kern DPM   docusate sodium (COLACE) 100 MG capsule Take 100 mg by mouth 2 (Two) Times a Day. 1/1/21  Yes Caio Thompson MD   acetaminophen (Tylenol 8 Hour) 650 MG 8 hr tablet Take 1 tablet by mouth Every 8 (Eight) Hours As Needed for Mild Pain . 2/1/22   Blake Burris MD   Blood Glucose Monitoring Suppl ([facility] Blood Glucose Meter)  w/Device kit 1 each 3 (Three) Times a Day. 10/9/20   Nirmal Calvillo MD   Capsaicin 0.035 % cream Apply 3 g topically 3 (Three) Times a Day As Needed (ankle pain). 4/26/22   Kit Brar MD   Diclofenac Sodium (VOLTAREN) 1 % gel gel Apply 4 g topically to the appropriate area as directed 4 (Four) Times a Day As Needed (Ankle pain). 4/26/22   Kit Brar MD   DULoxetine (CYMBALTA) 20 MG capsule Take 20 mg by mouth Daily. Indications: Disease of the Peripheral Nerves 1/1/21   Caio Thompson MD   Easy Touch Insulin Syringe 30G X 5/16\" 0.5 ML misc USE AS DIRECTED WITH LEVEMIR 12/1/21   Caio Thompson MD   EASY TOUCH PEN NEEDLES 31G X 8 MM misc  8/7/20   Caio Thompson MD   glucose blood test strip Use as instructed 10/9/20   Nirmal Calvillo MD   hydrOXYzine (ATARAX) 25 MG tablet Take 25 mg by mouth Every 8 (Eight) Hours As Needed for Itching. 1/1/21   Caio Thompson MD   Insulin Lispro (humaLOG) 100 UNIT/ML injection Inject 14 Units under the skin into the appropriate area as directed 3 (Three) Times a Day Before Meals. In addition to sliding scale    Caio Thompson MD   Insulin Lispro, 1 Unit Dial, (HUMALOG) 100 UNIT/ML solution pen-injector Inject 12 Units under the skin into the appropriate area as directed 3 (Three) Times a Day With Meals.  Patient taking differently: Inject  under the skin into the appropriate area as directed 3 (Three) Times a Day With Meals. Takes 14 units with meals plus sliding scale  Sliding scale:   150-199 take 4 units  200-249 take 8 units  250-299 take 12 units  300-349 take 16 units  350-400 take 20 units  greater than 400, call physician 5/24/22   Blake Burris MD   Insulin Pen Needle (NovoFine) 30G X 8 MM misc As directed 4 times daily 3/26/21   Nirmal Calvillo MD   Insulin Pen Needle 31G X 8 MM misc Use to inject insulin 4 (Four) Times a Day as directed. 10/28/21   Haily Mcneil MD    ipratropium-albuterol (DUO-NEB) 0.5-2.5 mg/3 ml nebulizer Take 3 mL by nebulization 4 (Four) Times a Day As Needed. 3/22/17   Caio Thompson MD   losartan (COZAAR) 100 MG tablet Take 100 mg by mouth Daily. 10/28/22   ConchaMarissain, DPM   memantine (NAMENDA) 5 MG tablet Take 5 mg by mouth 2 (Two) Times a Day. 10/28/22   Concha Kristie, DPM   Methoxy PEG-Epoetin Beta (MIRCERA IJ) 225 mcg Every 14 (Fourteen) Days. 3/14/22 3/13/23  Caio Thompson MD   montelukast (SINGULAIR) 10 MG tablet Take 1 tablet by mouth Every Night. 4/26/22   Kit Brar MD   nitroglycerin (NITROSTAT) 0.4 MG SL tablet Place 0.4 mg under the tongue Every 5 (Five) Minutes As Needed for Chest Pain (x 3 doses). 1/1/15   Caio Thompson MD   O2 (OXYGEN) Inhale 2 L/min Continuous. 1/1/21   aCio Thompson MD   ondansetron ODT (Zofran ODT) 4 MG disintegrating tablet Place 1 tablet on the tongue Every 8 (Eight) Hours As Needed for Nausea or Vomiting. 3/26/21   Nirmal Calvillo MD   oxyCODONE (ROXICODONE) 10 MG tablet Take 10 mg by mouth 4 (Four) Times a Day As Needed for Moderate Pain or Severe Pain. 11/4/22   Caio Thompson MD   pantoprazole (PROTONIX) 40 MG EC tablet Take 1 tablet by mouth Every Night. 4/26/22   Kit Brar MD   promethazine (PHENERGAN) 25 MG tablet Take 25 mg by mouth Every 6 (Six) Hours As Needed for Nausea or Vomiting. 11/4/22   Caio Thompson MD   sevelamer (RENVELA) 800 MG tablet Take 800 mg by mouth 3 (Three) Times a Day With Meals. 1/26/22   Caio Thompson MD   Vitamin D, Ergocalciferol, 69172 units capsule Take 50,000 Units by mouth 1 (One) Time Per Week. Take on Wednesday  Indications: Kidney Failure Syndrome 1/1/21   Caio Thompson, MD   gabapentin (NEURONTIN) 300 MG capsule Take 300 mg by mouth Daily. Repeat x 1 on M_W_F (dialysis days)  12/16/22  ProviderCaio MD   insulin aspart (novoLOG FLEXPEN) 100 UNIT/ML solution pen-injector  sc pen Inject 30 Units under the skin into the appropriate area as directed 3 (Three) Times a Day With Meals. 2/1/22 3/17/22  Blake Burris MD   insulin detemir (LEVEMIR) 100 UNIT/ML injection Inject 24 Units under the skin into the appropriate area as directed 2 (Two) Times a Day.  12/16/22  Provider, MD Caio       Past Medical History:   Diagnosis Date   • Acute blood loss anemia 04/16/2017    Likely due to gastric oozing at this time. - Dr. Duarte (GI) was consulted and has now signed off, will follow up outpatient - pill colonoscopy showed AVMs - continue to monitor   • Acute cystitis with hematuria 03/31/2021 1/13: IV Rocephin 1 gm q 24 1/14 : transitioned  to omnicef 300mg. Urine cultures resulted and did not show growth. Omnicef discontinued as patient is asymptomatic   • Altered mental status 01/09/2022    - AMS on presentation - initial ABG pH 7.3, CO2 34 - Procal 0.29 - UA negative for acute cysitits -CTA head wnl  - Empiric Zosyn and Vancomycin -Lactate 2.5 on admission  - blood cultures no growth at 24 hours     • Anxiety    • CAD (coronary artery disease) 04/24/2021    S/P 3 stents 5/1/2021 for BHL Continue ASA 81mg & Clopidogrel 75mg Continue Atorvastatin 40mg   • Carotid artery stenosis    • CHF (congestive heart failure) ([facility])    • Chronic obstructive lung disease (HCC)    • CKD (chronic kidney disease) stage 4, GFR 15-29 ml/min ([facility])    • CKD (chronic kidney disease), symptom management only, stage 5 ([facility]) 10/05/2020    Results from last 7 days Lab Units 12/15/21 0548 12/14/21 1323 12/14/21 0916 CREATININE mg/dL 3.92* 3.21* 3.32*  Baseline creatinine 2-3 GFR 13-25 GFR 15 Dialysis MWF, sees Dr. Lauren Nephrology consult,, appreciate recommendations Continue Bumex 1mg bid daily Holding Bumex 2mg 4 times a week   • Colonic polyp    • Coronary arteriosclerosis    • Diabetes mellitus ([facility])    • Diabetic neuropathy ([facility])    • Ear pain, right 10/18/2021    - canal trauma due to patient  scratching and DMT2 - added cortisporin ear drops   • Elevated troponin 10/12/2021    -most likely from CKD -Trending down -Neg chest pain   • Generalized abdominal pain 07/01/2022    Could be due to initiation of tube feeds vs dyspepsia vs abdominal cramps related to no PO intake due to intubation vs constipation Continue current laxative regiment  If no bowel movement by this afternoon will consider enema   • GERD (gastroesophageal reflux disease)    • GI bleed 05/13/2021    - GI will follow up outpatient - Protonix 40mg daily - Avoid medical DVT prophy and use mechanical at this time instead. - Continue to monitor - pill colonoscopy results showed AVMs   • History of transfusion    • Hypercholesterolemia    • Hyperosmolar hyperglycemic state (HHS) ([facility]) 06/25/2022    Serum glucose 605 on admission  Anion gap 16 PH 7.37 Bicarb 27.9 Continue fluids  Insulin drip with [facility] protocol  Anion gap closed around 10 AM, received one dose of Levamir subq, will stop insulin drip after 2 hrs     • Hypertension    • Hypokalemia 05/27/2022    Will replace as needed. Will be cautious in the setting of ESRD to avoid need for emergency dialysis   Monitor Qtc intervals on EKG     • Hypomagnesemia 06/27/2021    Monitor and replace   • Morbid obesity ([facility])    • Nephrolithiasis    • On mechanically assisted ventilation ([facility]) 06/26/2022    Vent management and sedation orders placed.  - [facility] intensivist group consulted for vent management appreciate recommendations  - plan to extubate today     • Peripheral vascular disease ([facility])    • Pleural effusion on right 06/26/2022    CXR on 6/30/22 read as a small upper left pulmonary edema vs early pneumonia.  Last Echo 1/2022 EF 61-65 % Continue to monitor  Procal slightly improved, CRP improved On Linezolid and meropenum      • PVD (peripheral vascular disease) ([facility])    • SIRS (systemic inflammatory response syndrome) ([facility]) 01/09/2022    Admission  - WBC 17.78   -   - RR 16 -  1/10: VSS/wnl - CXR - Mild pulm edema - Blood cultures no growth at 24 hours  - Procalcitonin 0.29 - UA : glucose 1000, negative Leucocytes/nitrate - Empiric Zosyn and Vancomcyin    • Sleep apnea    • Substance abuse (HCC)    • Vitamin D deficiency        Past Surgical History:   Procedure Laterality Date   • CARDIAC CATHETERIZATION N/A 07/14/2020   • CARDIAC CATHETERIZATION N/A 04/23/2021    Procedure: Left Heart Cath;  Surgeon: Melba Romo MD;  Location: [facility] CATH INVASIVE LOCATION;  Service: Cardiology;  Laterality: N/A;   • CARDIAC CATHETERIZATION N/A 04/30/2021    Procedure: Percutaneous Coronary Intervention;  Surgeon: Russell Voss MD;  Location: [facility] CATH INVASIVE LOCATION;  Service: Cardiovascular;  Laterality: N/A;   • CARDIAC CATHETERIZATION N/A 04/30/2021    Procedure: Stent NIKKI coronary;  Surgeon: Russell Voss MD;  Location: [facility] CATH INVASIVE LOCATION;  Service: Cardiovascular;  Laterality: N/A;   • CARDIAC CATHETERIZATION Left 11/13/2021    Procedure: Left Heart Cath;  Surgeon: Niall Rios MD;  Location: [facility] CATH INVASIVE LOCATION;  Service: Cardiology;  Laterality: Left;   • CAROTID STENT Left    • COLONOSCOPY     • COLONOSCOPY N/A 05/14/2021    Procedure: COLONOSCOPY;  Surgeon: Mingo Duarte MD;  Location: [facility] ENDOSCOPY;  Service: Gastroenterology;  Laterality: N/A;   • CORONARY ARTERY BYPASS GRAFT N/A 2013    CABG X 3   • CYSTOSCOPY BLADDER STONE LITHOTRIPSY Bilateral    • ENDOSCOPY N/A 04/12/2021    Procedure: ESOPHAGOGASTRODUODENOSCOPY;  Surgeon: Mingo Duarte MD;  Location: [facility] ENDOSCOPY;  Service: Gastroenterology;  Laterality: N/A;   • ENDOSCOPY N/A 05/14/2021    Procedure: ESOPHAGOGASTRODUODENOSCOPY;  Surgeon: Mingo Duarte MD;  Location: [facility] ENDOSCOPY;  Service: Gastroenterology;  Laterality: N/A;   • ENDOSCOPY N/A 01/28/2022    Procedure: ESOPHAGOGASTRODUODENOSCOPY;  Surgeon: Mingo Duarte MD;  Location: [facility]  ENDOSCOPY;  Service: Gastroenterology;  Laterality: N/A;   • HYSTERECTOMY     • INTERVENTIONAL RADIOLOGY PROCEDURE N/A 10/21/2021    Procedure: tunneled central venous catheter placement;  Surgeon: Donnie Robles MD;  Location: [facility] ANGIO INVASIVE LOCATION;  Service: Interventional Radiology;  Laterality: N/A;   • INTERVENTIONAL RADIOLOGY PROCEDURE N/A 01/27/2022    Procedure: tunneled central venous catheter placement;  Surgeon: Donnie Robles MD;  Location: [facility] INVASIVE LOCATION;  Service: Interventional Radiology;  Laterality: N/A;   • LAPAROSCOPIC TUBAL LIGATION     • TUNNELED VENOUS CATHETER PLACEMENT         Social History     Socioeconomic History   • Marital status: Legally      Spouse name: huy dumont   Tobacco Use   • Smoking status: Every Day     Packs/day: 0.50     Years: 46.00     Pack years: 23.00     Types: Cigarettes     Start date: 2/1/1999   • Smokeless tobacco: Never   Vaping Use   • Vaping Use: Never used   Substance and Sexual Activity   • Alcohol use: No   • Drug use: Not Currently     Types: LSD, Marijuana, Methamphetamines   • Sexual activity: Defer       Family History   Problem Relation Age of Onset   • Heart disease Mother    • Lung cancer Mother    • Heart disease Father    • Heart attack Father    • Diabetes Father    • Heart disease Half-Sister         Dad's side   • Heart disease Brother    • No Known Problems Sister    • No Known Problems Sister    • No Known Problems Sister    • No Known Problems Sister    • No Known Problems Sister    • Pancreatic cancer Half-Sister         Dad's side   • No Known Problems Brother    • No Known Problems Brother    • Heart attack Half-Brother    • Heart attack Half-Brother    • No Known Problems Maternal Grandmother    • No Known Problems Maternal Grandfather    • No Known Problems Paternal Grandmother    • No Known Problems Paternal Grandfather              This document has been electronically signed by Huy  Pawel White MD on December 16, 2022 15:39 CST           Electronically signed by Huy White MD at 12/16/22 1092   Source Note            Yamileth Slater is a 62 y.o. female   Primary provider:  Kiersten Wilson APRN       Chief Complaint   Patient presents with   • Left Foot - Initial Evaluation, Pain       HISTORY OF PRESENT ILLNESS:    The patient is a 62-year-old female with a long, complicated history of chronic respiratory failure on home oxygen, diabetes mellitus type 2, morbid obesity, end-stage renal disease on dialysis, coronary artery disease and worsening wounds on both feet.  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  The left foot is what has been getting worse recently.  She was recently evaluated by vascular and had very little blood flow below her left knee and therefore no further surgical intervention was warranted or recommended on her left foot.  She has a large open wound and has been doing dressing changes.  She wishes to discuss definitive treatment options for her left foot.  She reports pain all the time.  She is supposed to be limited weightbearing, however, both feet are involved which makes mobility very challenging.  She wants to be mobile and she wants to be more active.    Pain  This is a new problem. Episode onset: 2 weeks ago. Associated symptoms include chest pain, nausea, numbness and vomiting. Pertinent negatives include no chills or fever. Associated symptoms comments: Redness and swelling. She has tried NSAIDs and acetaminophen for the symptoms.        CONCURRENT MEDICAL HISTORY:    Past Medical History:   Diagnosis Date   • Acute blood loss anemia 4/16/2017    Likely due to gastric oozing at this time. - Dr. Duarte (GI) was consulted and has now signed off, will follow up outpatient - pill colonoscopy showed AVMs - continue to monitor   • Acute cystitis with hematuria  3/31/2021    1/13: IV Rocephin 1 gm q 24 1/14 : transitioned  to omnicef 300mg. Urine cultures resulted and did not show growth. Omnicef discontinued as patient is asymptomatic   • Altered mental status 1/9/2022    - AMS on presentation - initial ABG pH 7.3, CO2 34 - Procal 0.29 - UA negative for acute cysitits -CTA head wnl  - Empiric Zosyn and Vancomycin -Lactate 2.5 on admission  - blood cultures no growth at 24 hours     • Anxiety    • CAD (coronary artery disease) 4/24/2021    S/P 3 stents 5/1/2021 for BHL Continue ASA 81mg & Clopidogrel 75mg Continue Atorvastatin 40mg   • Carotid artery stenosis    • Chronic obstructive lung disease (HCC)    • CKD (chronic kidney disease) stage 4, GFR 15-29 ml/min (HCC)    • CKD (chronic kidney disease), symptom management only, stage 5 (HCC) 10/5/2020    Results from last 7 days Lab Units 12/15/21 0548 12/14/21 1323 12/14/21 0916 CREATININE mg/dL 3.92* 3.21* 3.32*  Baseline creatinine 2-3 GFR 13-25 GFR 15 Dialysis MWF, sees Dr. Lauren Nephrology consult,, appreciate recommendations Continue Bumex 1mg bid daily Holding Bumex 2mg 4 times a week   • Colonic polyp    • Coronary arteriosclerosis    • Diabetes mellitus (HCC)    • Diabetic neuropathy (HCC)    • Ear pain, right 10/18/2021    - canal trauma due to patient scratching and DMT2 - added cortisporin ear drops   • Elevated troponin 10/12/2021    -most likely from CKD -Trending down -Neg chest pain   • Generalized abdominal pain 7/1/2022    Could be due to initiation of tube feeds vs dyspepsia vs abdominal cramps related to no PO intake due to intubation vs constipation Continue current laxative regiment  If no bowel movement by this afternoon will consider enema   • GERD (gastroesophageal reflux disease)    • GI bleed 5/13/2021    - GI will follow up outpatient - Protonix 40mg daily - Avoid medical DVT prophy and use mechanical at this time instead. - Continue to monitor - pill colonoscopy results showed AVMs   • History of  transfusion    • Hypercholesterolemia    • Hyperosmolar hyperglycemic state (HHS) ([facility]) 6/25/2022    Serum glucose 605 on admission  Anion gap 16 PH 7.37 Bicarb 27.9 Continue fluids  Insulin drip with [facility] protocol  Anion gap closed around 10 AM, received one dose of Levamir subq, will stop insulin drip after 2 hrs     • Hypertension    • Hypokalemia 5/27/2022    Will replace as needed. Will be cautious in the setting of ESRD to avoid need for emergency dialysis   Monitor Qtc intervals on EKG     • Hypomagnesemia 6/27/2021    Monitor and replace   • Morbid obesity ([facility])    • Nephrolithiasis    • On mechanically assisted ventilation ([facility]) 6/26/2022    Vent management and sedation orders placed.  - [facility] intensivist group consulted for vent management appreciate recommendations  - plan to extubate today     • Peripheral vascular disease ([facility])    • Pleural effusion on right 6/26/2022    CXR on 6/30/22 read as a small upper left pulmonary edema vs early pneumonia.  Last Echo 1/2022 EF 61-65 % Continue to monitor  Procal slightly improved, CRP improved On Linezolid and meropenum      • SIRS (systemic inflammatory response syndrome) ([facility]) 1/9/2022    Admission  - WBC 17.78   -   - RR 16 - 1/10: VSS/wnl - CXR - Mild pulm edema - Blood cultures no growth at 24 hours  - Procalcitonin 0.29 - UA : glucose 1000, negative Leucocytes/nitrate - Empiric Zosyn and Vancomcyin    • Sleep apnea    • Substance abuse ([facility])    • Vitamin D deficiency        Allergies   Allergen Reactions   • Adhesive Tape Hives, Other (See Comments) and Rash   • Nsaids Hives   • Latex Other (See Comments) and Hives   • Other Itching     Bandaids, MRSA, SURECLOSE   • Wound Dressing Adhesive Hives and Rash         Current Outpatient Medications:   •  acetaminophen (Tylenol 8 Hour) 650 MG 8 hr tablet, Take 1 tablet by mouth Every 8 (Eight) Hours As Needed for Mild Pain ., Disp: 90 tablet, Rfl: 0  •  albuterol (PROVENTIL) (2.5 MG/3ML) 0.083%  nebulizer solution, Inhale the contents of 1 vial by nebulization Every 4 (Four) Hours As Needed for Wheezing., Disp: 75 mL, Rfl: 3  •  albuterol sulfate  (90 Base) MCG/ACT inhaler, Inhale 2 puffs Every 4 (Four) Hours As Needed for Wheezing., Disp: 18 g, Rfl: 1  •  atorvastatin (LIPITOR) 20 MG tablet, Take 1 tablet by mouth every night at bedtime., Disp: 90 tablet, Rfl: 0  •  B Complex-C-Folic Acid (RENAL VITAMIN PO), Take 1 tablet by mouth Daily., Disp: , Rfl:   •  Blood Glucose Monitoring Suppl ([facility] Blood Glucose Meter) w/Device kit, 1 each 3 (Three) Times a Day., Disp: 1 kit, Rfl: 3  •  Capsaicin 0.035 % cream, Apply 3 g topically 3 (Three) Times a Day As Needed (ankle pain)., Disp: 42.5 g, Rfl: 2  •  Cetirizine HCl 10 MG capsule, Take 1 capsule by mouth Daily., Disp: , Rfl:   •  clopidogrel (PLAVIX) 75 MG tablet, Take 1 tablet by mouth Daily., Disp: 30 tablet, Rfl: 5  •  collagenase (Santyl) 250 UNIT/GM ointment, Apply 1 application topically to the appropriate area as directed Daily., Disp: 90 g, Rfl: 2  •  CYCLOBENZAPRINE HCL PO, Take 5 mg by mouth Daily., Disp: , Rfl:   •  Diclofenac Sodium (VOLTAREN) 1 % gel gel, Apply 4 g topically to the appropriate area as directed 4 (Four) Times a Day As Needed (Ankle pain)., Disp: 350 g, Rfl: 2  •  docusate sodium (COLACE) 100 MG capsule, Take 100 mg by mouth 2 (Two) Times a Day., Disp: , Rfl:   •  DULoxetine (CYMBALTA) 20 MG capsule, Take 20 mg by mouth Daily. Indications: Disease of the Peripheral Nerves, Disp: , Rfl:   •  Easy Touch Insulin Syringe 30G X 5/16\" 0.5 ML misc, USE AS DIRECTED WITH LEVEMIR, Disp: , Rfl:   •  EASY TOUCH PEN NEEDLES 31G X 8 MM misc, , Disp: , Rfl:   •  gabapentin (NEURONTIN) 300 MG capsule, TAKE 1 CAPSULE BY MOUTH DAILY. AND AN EXTRA PILL MONDAY/WEDNESDAY/FRIDAY - DIALYSIS DAYS, Disp: 45 capsule, Rfl: 2  •  glucose blood test strip, Use as instructed, Disp: 100 each, Rfl: 12  •  hydrOXYzine (ATARAX) 25 MG tablet, Take 25 mg by mouth  Every 8 (Eight) Hours As Needed for Itching., Disp: , Rfl:   •  insulin detemir (LEVEMIR) 100 UNIT/ML injection, Inject 25 Units under the skin into the appropriate area as directed Every Night. (Patient taking differently: Inject 24 Units under the skin into the appropriate area as directed 2 (Two) Times a Day. Indications: Type 2 Diabetes), Disp: 3 mL, Rfl: 12  •  Insulin Lispro, 1 Unit Dial, (HUMALOG) 100 UNIT/ML solution pen-injector, Inject 12 Units under the skin into the appropriate area as directed 3 (Three) Times a Day With Meals. (Patient taking differently: Inject 14 Units under the skin into the appropriate area as directed 3 (Three) Times a Day With Meals. Sliding scale:  150-199 take 4 units 200-249 take 8 units 250-299 take 12 units 300-349 take 16 units 350-400 take 20 units greater than 400, call physician), Disp: 30 mL, Rfl: 12  •  Insulin Pen Needle (NovoFine) 30G X 8 MM misc, As directed 4 times daily, Disp: 100 each, Rfl: 11  •  Insulin Pen Needle 31G X 8 MM misc, Use to inject insulin 4 (Four) Times a Day as directed., Disp: 120 each, Rfl: 12  •  ipratropium-albuterol (DUO-NEB) 0.5-2.5 mg/3 ml nebulizer, Take 3 mL by nebulization 4 (Four) Times a Day., Disp: , Rfl:   •  memantine (NAMENDA) 5 MG tablet, Take 5 mg by mouth 2 (Two) Times a Day., Disp: , Rfl:   •  montelukast (SINGULAIR) 10 MG tablet, Take 1 tablet by mouth Every Night., Disp: 90 tablet, Rfl: 0  •  nitroglycerin (NITROSTAT) 0.4 MG SL tablet, Place 0.4 mg under the tongue Every 5 (Five) Minutes As Needed for Chest Pain (x 3 doses)., Disp: , Rfl:   •  O2 (OXYGEN), Inhale 3 L/min Continuous., Disp: , Rfl:   •  ondansetron ODT (Zofran ODT) 4 MG disintegrating tablet, Place 1 tablet on the tongue Every 8 (Eight) Hours As Needed for Nausea or Vomiting., Disp: 30 tablet, Rfl: 0  •  oxyCODONE (ROXICODONE) 10 MG tablet, Take 10 mg by mouth 4 (Four) Times a Day As Needed for Moderate Pain or Severe Pain., Disp: , Rfl:   •  pantoprazole  (PROTONIX) 40 MG EC tablet, Take 1 tablet by mouth Every Night., Disp: 90 tablet, Rfl: 0  •  promethazine (PHENERGAN) 25 MG tablet, Take 25 mg by mouth Every 6 (Six) Hours As Needed for Nausea or Vomiting., Disp: , Rfl:   •  sevelamer (RENVELA) 800 MG tablet, Take 800 mg by mouth 3 (Three) Times a Day With Meals., Disp: , Rfl:   •  Vitamin D, Ergocalciferol, 76244 units capsule, Take 50,000 Units by mouth 1 (One) Time Per Week. Take on Wednesday  Indications: Kidney Failure Syndrome, Disp: , Rfl:   •  DULoxetine (CYMBALTA) 20 MG capsule, Take 2 capsules by mouth Daily for 90 days., Disp: 180 capsule, Rfl: 0  •  losartan (COZAAR) 100 MG tablet, Take 100 mg by mouth Daily., Disp: , Rfl:   •  Methoxy PEG-Epoetin Beta (MIRCERA IJ), 225 mcg Every 14 (Fourteen) Days., Disp: , Rfl:   •  torsemide (DEMADEX) 20 MG tablet, Take 20 mg by mouth 2 (Two) Times a Day., Disp: , Rfl:     Past Surgical History:   Procedure Laterality Date   • CARDIAC CATHETERIZATION N/A 7/14/2020   • CARDIAC CATHETERIZATION N/A 4/23/2021    Procedure: Left Heart Cath;  Surgeon: Melba Romo MD;  Location: [facility] CATH INVASIVE LOCATION;  Service: Cardiology;  Laterality: N/A;   • CARDIAC CATHETERIZATION N/A 4/30/2021    Procedure: Percutaneous Coronary Intervention;  Surgeon: Russell Voss MD;  Location: [facility] CATH INVASIVE LOCATION;  Service: Cardiovascular;  Laterality: N/A;   • CARDIAC CATHETERIZATION N/A 4/30/2021    Procedure: Stent NIKKI coronary;  Surgeon: Russell Voss MD;  Location: [facility] CATH INVASIVE LOCATION;  Service: Cardiovascular;  Laterality: N/A;   • CARDIAC CATHETERIZATION Left 11/13/2021    Procedure: Left Heart Cath;  Surgeon: Niall Rios MD;  Location: [facility] CATH INVASIVE LOCATION;  Service: Cardiology;  Laterality: Left;   • CAROTID STENT Left    • COLONOSCOPY     • COLONOSCOPY N/A 5/14/2021    Procedure: COLONOSCOPY;  Surgeon: Mingo Duarte MD;  Location: [facility] ENDOSCOPY;  Service:  Gastroenterology;  Laterality: N/A;   • CORONARY ARTERY BYPASS GRAFT N/A 2013    CABG X 3   • CYSTOSCOPY BLADDER STONE LITHOTRIPSY Bilateral    • ENDOSCOPY N/A 4/12/2021    Procedure: ESOPHAGOGASTRODUODENOSCOPY;  Surgeon: Mingo Duarte MD;  Location: [facility] ENDOSCOPY;  Service: Gastroenterology;  Laterality: N/A;   • ENDOSCOPY N/A 5/14/2021    Procedure: ESOPHAGOGASTRODUODENOSCOPY;  Surgeon: Mingo Duarte MD;  Location: [facility] ENDOSCOPY;  Service: Gastroenterology;  Laterality: N/A;   • ENDOSCOPY N/A 1/28/2022    Procedure: ESOPHAGOGASTRODUODENOSCOPY;  Surgeon: Mingo Duarte MD;  Location: [facility] ENDOSCOPY;  Service: Gastroenterology;  Laterality: N/A;   • INTERVENTIONAL RADIOLOGY PROCEDURE N/A 10/21/2021    Procedure: tunneled central venous catheter placement;  Surgeon: Donnie Robles MD;  Location: [facility] ANGIO INVASIVE LOCATION;  Service: Interventional Radiology;  Laterality: N/A;   • INTERVENTIONAL RADIOLOGY PROCEDURE N/A 1/27/2022    Procedure: tunneled central venous catheter placement;  Surgeon: Donnie Robles MD;  Location: [facility] ANGIO INVASIVE LOCATION;  Service: Interventional Radiology;  Laterality: N/A;       Family History   Problem Relation Age of Onset   • Heart disease Mother    • Lung cancer Mother    • Heart disease Father    • Heart attack Father    • Diabetes Father    • Heart disease Half-Sister         Dad's side   • Heart disease Brother    • No Known Problems Sister    • No Known Problems Sister    • No Known Problems Sister    • No Known Problems Sister    • No Known Problems Sister    • Pancreatic cancer Half-Sister         Dad's side   • No Known Problems Brother    • No Known Problems Brother    • Heart attack Half-Brother    • Heart attack Half-Brother    • No Known Problems Maternal Grandmother    • No Known Problems Maternal Grandfather    • No Known Problems Paternal Grandmother    • No Known Problems Paternal Grandfather        Social History      Socioeconomic History   • Marital status: Legally      Spouse name: wesley dumont   Tobacco Use   • Smoking status: Former     Packs/day: 0.50     Years: 46.00     Pack years: 23.00     Types: Cigarettes     Start date: 1999     Quit date: 2/15/2022     Years since quittin.8   • Smokeless tobacco: Never   • Tobacco comments:     only smoking 5 a day - quit 2021   Substance and Sexual Activity   • Alcohol use: No   • Drug use: Not Currently     Types: LSD, Marijuana, Methamphetamines   • Sexual activity: Not Currently        Review of Systems   Constitutional: Negative for chills and fever.   Respiratory: Positive for shortness of breath and wheezing.         Chronic home oxygen   Cardiovascular: Positive for chest pain.   Gastrointestinal: Positive for nausea and vomiting.   Musculoskeletal:        Stiffness and itching   Neurological: Positive for numbness.   All other systems reviewed and are negative.      PHYSICAL EXAMINATION:       Ht 157.5 cm (62\")   Wt 117 kg (257 lb)   LMP  (LMP Unknown)   BMI 47.01 kg/m²     Physical Exam  Vitals reviewed.   Constitutional:       General: She is not in acute distress.     Appearance: She is well-developed. She is obese.   Eyes:      Conjunctiva/sclera: Conjunctivae normal.      Pupils: Pupils are equal, round, and reactive to light.   Neck:      Trachea: No tracheal deviation.   Cardiovascular:      Rate and Rhythm: Normal rate and regular rhythm.      Heart sounds: Normal heart sounds.   Pulmonary:      Breath sounds: Wheezing present.      Comments: On home oxygen  Chest:      Chest wall: No tenderness.   Abdominal:      General: Bowel sounds are normal. There is no distension.      Palpations: Abdomen is soft. There is no mass.      Tenderness: There is no abdominal tenderness.   Musculoskeletal:      Cervical back: Neck supple. No muscular tenderness.   Lymphadenopathy:      Cervical: No cervical adenopathy.   Skin:     General: Skin is  warm.      Findings: No rash.   Neurological:      Mental Status: She is alert and oriented to person, place, and time.   Psychiatric:         Behavior: Behavior normal.         Thought Content: Thought content normal.         Judgment: Judgment normal.         GAIT:     []  Normal  [x]  Antalgic    Assistive device: []  None  []  Walker     []  Crutches  []  Cane     [x]  Wheelchair  []  Stretcher    Left Ankle Exam     Comments:  Left foot wound was evaluated and she has an open wound involving the entirety of the midfoot.  Metatarsal fragments are noted from the first second third and fourth metatarsals.  She has exudate around the skin edges.  She has necrotic tissue involving the anterior and posterior aspects of the skin flap.  She has diffuse erythema around the remainder of the foot and on the distal tibial region.  She does have hair growth from mid shin proximal.  She has good capillary refill mid shin and proximal.  She has erythema that extends up the shin about 6 cm or so proximal to the joint space.                  XR Foot 3+ View Bilateral    Result Date: 12/2/2022  Narrative: PROCEDURE: XR FOOT 3+ VW BILATERAL VIEWS: 3 views each INDICATION: Foot ulcer, osteomyelitis COMPARISON: MRI of right foot, 9/7/2022 FINDINGS: Right foot Examination is limited by bandage material overlying the first metatarsal. There is been interval first metatarsal phalangeal joint amputation.   - fracture: None identified. No definite destructive lesion. Mild soft tissue swelling overlies the metatarsals. Vascular calcification present. Calcaneal enthesophytes are seen.   - alignment: WNL   - misc: age-related degenerative changes Left foot Bandage material overlying the foot distally somewhat limits evaluation.   - fracture: No acute fracture identified. There is been amputation of the proximal metatarsals of the first through fifth rays. No definite destructive lesions are seen. There is a 1.0 x 0.6 cm osseous fragment  medial to the medial cuneiform. Source of this fragment is not identified.   - alignment: WNL   - misc:  Soft tissue defect overlies the metatarsals stumps, again limited in evaluation due to overlying bandage material. Vascular calcification present. Calcaneal enthesophyte at the origin of the plantar aponeurosis     Impression: Limited evaluation of both feet due to overlying bandage material. This particularly limits evaluation for small amounts of gas in soft tissues. Soft tissue tissue swelling is seen bilaterally, and there is probably a soft tissue defect overlying the remaining proximal left metatarsals. Clinical correlation needed.. No definite destructive lesions are identified. (Note:  if pain or symptoms persist beyond reasonable expectations and follow-up imaging is anticipated,  scintigraphic or cross sectional imaging (CT and/or MRI) is suggested, as is deemed clinically appropriate). Electronically signed by:  Kayla Mccoy MD  12/2/2022 11:40 AM CST Workstation: [id number]    Doppler Ankle Brachial Index Single Level CAR    Result Date: 12/5/2022  Narrative: •  Right Conclusion: The right SUNDEEP is severely reduced. Waveforms are consistent with aorto-iliac disease and tib-peroneal disease. •  Left Conclusion: The left SUNDEEP is severely reduced. Waveforms are consistent with femoral disease and tib-peroneal disease.     The T com's were reviewed from Friday, December 9.  The below knee sites medial and lateral were marked as 63 and 67 which seems to indicate a good possibility for healing.      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 (HCC)  -     Case Request; Standing  -     ceFAZolin (ANCEF) 2 g in sodium chloride 0.9 % 100 mL IVPB  -     Case Request    Left foot pain  -     Case Request; Standing  -     ceFAZolin (ANCEF) 2 g in sodium chloride 0.9 % 100 mL IVPB  -     Case Request    MIKE and COPD overlap syndrome (HCC)  -     Case  Request; Standing  -     ceFAZolin (ANCEF) 2 g in sodium chloride 0.9 % 100 mL IVPB  -     Case Request    Bilateral carotid artery stenosis  -     Case Request; Standing  -     ceFAZolin (ANCEF) 2 g in sodium chloride 0.9 % 100 mL IVPB  -     Case Request    PAD (peripheral artery disease) ([facility])  -     Case Request; Standing  -     ceFAZolin (ANCEF) 2 g in sodium chloride 0.9 % 100 mL IVPB  -     Case Request    Venous insufficiency  -     Case Request; Standing  -     ceFAZolin (ANCEF) 2 g in sodium chloride 0.9 % 100 mL IVPB  -     Case Request    Pulmonary hypertension ([facility] [place])  -     Case Request; Standing  -     ceFAZolin (ANCEF) 2 g in sodium chloride 0.9 % 100 mL IVPB  -     Case Request    Coronary artery disease involving native coronary artery of native heart without angina pectoris  -     Case Request; Standing  -     ceFAZolin (ANCEF) 2 g in sodium chloride 0.9 % 100 mL IVPB  -     Case Request    Morbid obesity with BMI of 45.0-49.9, adult ([facility] [place])  -     Case Request; Standing  -     ceFAZolin (ANCEF) 2 g in sodium chloride 0.9 % 100 mL IVPB  -     Case Request    CKD (chronic kidney disease) requiring chronic dialysis ([facility])  -     Case Request; Standing  -     ceFAZolin (ANCEF) 2 g in sodium chloride 0.9 % 100 mL IVPB  -     Case Request    On home oxygen therapy  -     Case Request; Standing  -     ceFAZolin (ANCEF) 2 g in sodium chloride 0.9 % 100 mL IVPB  -     Case Request    Other orders  -     Follow Anesthesia Guidelines / Protocol; Future  -     Follow Anesthesia Guidelines / Protocol; Standing  -     Verify NPO Status; Standing  -     Obtain informed consent (if not collected inpatient or PAT); Standing  -     Provide instructions to patient regarding NPO status; Future          PLAN    Long discussion was held with the patient and her family member regarding further treatment options.  She has had multiple debridements involving her foot and has had multiple revascularization attempts for distal  blood supply in the left leg.  All have failed and she now has a large open wound involving basically an open transmetatarsal amputation.  Wound VAC has been discontinued previously.  She has necrotic tissue that is present and has recent vascular studies that show she essentially has no flow distal to the mid tibial region.  Patient was referred for evaluation for proximal amputation.    In this setting, she does have hair growth on her shin and does have good capillary refill involving most of the proximal two thirds of the tib-fib region.  We discussed the possibility of below-knee amputation especially in light of favorable numbers on the T com evaluation.  We did, of course, discussed that in the presence of smoking and with known peripheral vascular disease it is possible that we would not heal the below-knee amputation and have to progress to an above-knee amputation.  Patient understands this risk and agrees to pursue below-knee amputation due to improved functional outcome.    Patient is extremely high risk due to smoking, diabetes mellitus, COPD with chronic home oxygen, pulmonary hypertension, morbid obesity, and end-stage renal disease on dialysis.    The patient voiced understanding of the risks, benefits, and alternative forms of treatment that were discussed and the patient consents to proceed with surgery.  All risks, benefits and alternatives were discussed.  Risks include, but not exclusive to anesthetic complications, including death, MI, CVA, infection, bleeding DVT, fracture, residual pain and need for future surgery.    This discussion was held with the patient by Huy White MD and all questions were answered.    Plan left below-knee amputation.    Discussions were held with Dr. Oliveira in regards to obtaining the T com evaluation as well as prior wound care treatment.  Discussion also held with the hospitalist service for logistics on hospital admission and perioperative care with the  logistics of all of her medical issues.  Discussion was also held with Dr. Gupta with the renal service to schedule and plan for dialysis at the hospital prior to surgical intervention.  All are in agreement to proceed.    Over an hour was spent with the patient in face-to-face discussions and surgical risk stratification, chart review to determine the extent of medical conditions, and in discussions with the above-mentioned physicians for strategizing the logistics of her care.    Return for Post-operative eval.    Huy White MD        Electronically signed by Huy White MD at 12/13/22 2813             Juvenal Roberson DO at 12/15/22 1720                [facility] Medicine Admission      Date of Admission: 12/15/2022      Primary Care Physician: Kiersten Wilson APRN      Chief Complaint: Left foot pain secondary to necrosis    HPI:  The patient she is a 62-year-old female admitted to the hospital direct admission from orthopedic surgery.  The patient has a longstanding history of diabetic neuropathy and had trauma to her left foot which resulted in infection and ultimately gangrene.  She has had a transmetatarsal amputation completed which continued to worsen and gangrene once again set in and now she is being admitted to the hospital for below the knee amputation.  The patient states that this pain is controlled with oxycodone.  She has had some nausea vomiting.  No fevers or chills.  No headache no chest pain no worsening of shortness of breath.  She has a history of end-stage renal disease Dr. Gupta is her nephrologist and has been consulted to manage patient's hemodialysis which is to be completed on Monday Wednesday and Friday.  The patient will be scheduled for surgery tomorrow afternoon after dialysis has been completed.  Case has been discussed with Dr. White from orthopedic surgery and Dr. Gupta from nephrology.      Concurrent  Medical History:  has a past medical history of Acute blood loss anemia (04/16/2017), Acute cystitis with hematuria (03/31/2021), Altered mental status (01/09/2022), Anxiety, CAD (coronary artery disease) (04/24/2021), Carotid artery stenosis, CHF (congestive heart failure) ([facility]), Chronic obstructive lung disease ([facility]), CKD (chronic kidney disease) stage 4, GFR 15-29 ml/min ([facility]), CKD (chronic kidney disease), symptom management only, stage 5 ([facility]) (10/05/2020), Colonic polyp, Coronary arteriosclerosis, Diabetes mellitus ([facility]), Diabetic neuropathy ([facility]), Ear pain, right (10/18/2021), Elevated troponin (10/12/2021), Generalized abdominal pain (07/01/2022), GERD (gastroesophageal reflux disease), GI bleed (05/13/2021), History of transfusion, Hypercholesterolemia, Hyperosmolar hyperglycemic state (HHS) ([facility]) (06/25/2022), Hypertension, Hypokalemia (05/27/2022), Hypomagnesemia (06/27/2021), Morbid obesity ([facility]), Nephrolithiasis, On mechanically assisted ventilation ([facility]) (06/26/2022), Peripheral vascular disease ([facility]), Pleural effusion on right (06/26/2022), PVD (peripheral vascular disease) ([facility]), SIRS (systemic inflammatory response syndrome) ([facility]) (01/09/2022), Sleep apnea, Substance abuse ([facility]), and Vitamin D deficiency.    Past Surgical History:  has a past surgical history that includes Colonoscopy; Carotid stent (Left); Cardiac catheterization (N/A, 07/14/2020); cystoscopy bladder stone lithotripsy (Bilateral); Esophagogastroduodenoscopy (N/A, 04/12/2021); Cardiac catheterization (N/A, 04/23/2021); Cardiac catheterization (N/A, 04/30/2021); Cardiac catheterization (N/A, 04/30/2021); Esophagogastroduodenoscopy (N/A, 05/14/2021); Colonoscopy (N/A, 05/14/2021); Coronary artery bypass graft (N/A, 2013); Interventional radiology procedure (N/A, 10/21/2021); Cardiac catheterization (Left, 11/13/2021); Interventional radiology procedure (N/A, 01/27/2022); Esophagogastroduodenoscopy (N/A, 01/28/2022); Hysterectomy;  Laparoscopic tubal ligation; and Tunneled venous catheter placement.    Family History: family history includes Diabetes in her father; Heart attack in her father, half-brother, and half-brother; Heart disease in her brother, father, half-sister, and mother; Lung cancer in her mother; No Known Problems in her brother, brother, maternal grandfather, maternal grandmother, paternal grandfather, paternal grandmother, sister, sister, sister, sister, and sister; Pancreatic cancer in her half-sister.     Social History:  reports that she has been smoking cigarettes. She started smoking about 23 years ago. She has a 23.00 pack-year smoking history. She has never used smokeless tobacco. She reports that she does not currently use drugs after having used the following drugs: LSD, Marijuana, and Methamphetamines. She reports that she does not drink alcohol.    Allergies:   Allergies   Allergen Reactions   • Adhesive Tape Hives, Other (See Comments) and Rash   • Nsaids Hives   • Latex Other (See Comments) and Hives   • Other Itching     Bandaids, MRSA, SURECLOSE   • Wound Dressing Adhesive Hives and Rash       Medications:   Prior to Admission medications    Medication Sig Start Date End Date Taking? Authorizing Provider   albuterol (PROVENTIL) (2.5 MG/3ML) 0.083% nebulizer solution Inhale the contents of 1 vial by nebulization Every 4 (Four) Hours As Needed for Wheezing. 10/28/21  Yes Haily Mcneil MD   albuterol sulfate  (90 Base) MCG/ACT inhaler Inhale 2 puffs Every 4 (Four) Hours As Needed for Wheezing. 3/26/21  Yes Nirmal Calvillo MD   atorvastatin (LIPITOR) 20 MG tablet Take 1 tablet by mouth every night at bedtime. 4/26/22  Yes Kit Brar MD   Cetirizine HCl 10 MG capsule Take 1 capsule by mouth Daily. 11/4/21  Yes Caio Thompson MD   clopidogrel (PLAVIX) 75 MG tablet Take 1 tablet by mouth Daily. 3/26/21  Yes Nirmal Calvillo MD   CYCLOBENZAPRINE HCL PO Take 5 mg by mouth 2  (Two) Times a Day. 10/28/22  Yes Kristie Kern, RAMIREZ   docusate sodium (COLACE) 100 MG capsule Take 100 mg by mouth 2 (Two) Times a Day. 1/1/21  Yes Caio Thompson MD   acetaminophen (Tylenol 8 Hour) 650 MG 8 hr tablet Take 1 tablet by mouth Every 8 (Eight) Hours As Needed for Mild Pain . 2/1/22   Blake Burris MD   Blood Glucose Monitoring Suppl ([employer] Blood Glucose Meter) w/Device kit 1 each 3 (Three) Times a Day. 10/9/20   Nirmal Calvillo MD   Capsaicin 0.035 % cream Apply 3 g topically 3 (Three) Times a Day As Needed (ankle pain). 4/26/22   Kit Brar MD   Diclofenac Sodium (VOLTAREN) 1 % gel gel Apply 4 g topically to the appropriate area as directed 4 (Four) Times a Day As Needed (Ankle pain). 4/26/22   Kit Brar MD   DULoxetine (CYMBALTA) 20 MG capsule Take 20 mg by mouth Daily. Indications: Disease of the Peripheral Nerves 1/1/21   Caio Thompson MD   Easy Touch Insulin Syringe 30G X 5/16\" 0.5 ML misc USE AS DIRECTED WITH LEVEMIR 12/1/21   Caio Thompson MD   EASY TOUCH PEN NEEDLES 31G X 8 MM misc  8/7/20   Caio Thompson MD   gabapentin (NEURONTIN) 300 MG capsule Take 300 mg by mouth Daily. Repeat x 1 on M_W_F (dialysis days)    Caio Thompson MD   glucose blood test strip Use as instructed 10/9/20   Nirmal Calvillo MD   hydrOXYzine (ATARAX) 25 MG tablet Take 25 mg by mouth Every 8 (Eight) Hours As Needed for Itching. 1/1/21   Caio Thompson MD   insulin detemir (LEVEMIR) 100 UNIT/ML injection Inject 24 Units under the skin into the appropriate area as directed 2 (Two) Times a Day.    Caio Thompson MD   Insulin Lispro (humaLOG) 100 UNIT/ML injection Inject 14 Units under the skin into the appropriate area as directed 3 (Three) Times a Day Before Meals. In addition to sliding scale    Caio Thompson MD   Insulin Lispro, 1 Unit Dial, (HUMALOG) 100 UNIT/ML solution pen-injector Inject 12 Units under the skin  into the appropriate area as directed 3 (Three) Times a Day With Meals.  Patient taking differently: Inject  under the skin into the appropriate area as directed 3 (Three) Times a Day With Meals. Takes 14 units with meals plus sliding scale  Sliding scale:   150-199 take 4 units  200-249 take 8 units  250-299 take 12 units  300-349 take 16 units  350-400 take 20 units  greater than 400, call physician 5/24/22   Blake Burris MD   Insulin Pen Needle (NovoFine) 30G X 8 MM misc As directed 4 times daily 3/26/21   Nirmal Calvillo MD   Insulin Pen Needle 31G X 8 MM misc Use to inject insulin 4 (Four) Times a Day as directed. 10/28/21   Haily Mcneil MD   ipratropium-albuterol (DUO-NEB) 0.5-2.5 mg/3 ml nebulizer Take 3 mL by nebulization 4 (Four) Times a Day As Needed. 3/22/17   Caio Thompson MD   losartan (COZAAR) 100 MG tablet Take 100 mg by mouth Daily. 10/28/22   Kristie Kern DPM   memantine (NAMENDA) 5 MG tablet Take 5 mg by mouth 2 (Two) Times a Day. 10/28/22   Kristie Kern DPM   Methoxy PEG-Epoetin Beta (MIRCERA IJ) 225 mcg Every 14 (Fourteen) Days. 3/14/22 3/13/23  Caio Thompson MD   montelukast (SINGULAIR) 10 MG tablet Take 1 tablet by mouth Every Night. 4/26/22   Kit Brar MD   nitroglycerin (NITROSTAT) 0.4 MG SL tablet Place 0.4 mg under the tongue Every 5 (Five) Minutes As Needed for Chest Pain (x 3 doses). 1/1/15   Caio Thompson MD   O2 (OXYGEN) Inhale 2 L/min Continuous. 1/1/21   Caio Thompson MD   ondansetron ODT (Zofran ODT) 4 MG disintegrating tablet Place 1 tablet on the tongue Every 8 (Eight) Hours As Needed for Nausea or Vomiting. 3/26/21   Nirmal Calvillo MD   oxyCODONE (ROXICODONE) 10 MG tablet Take 10 mg by mouth 4 (Four) Times a Day As Needed for Moderate Pain or Severe Pain. 11/4/22   Provider, MD Caio   pantoprazole (PROTONIX) 40 MG EC tablet Take 1 tablet by mouth Every Night. 4/26/22   Kit Brar MD    promethazine (PHENERGAN) 25 MG tablet Take 25 mg by mouth Every 6 (Six) Hours As Needed for Nausea or Vomiting. 11/4/22   Caio Thompson MD   sevelamer (RENVELA) 800 MG tablet Take 800 mg by mouth 3 (Three) Times a Day With Meals. 1/26/22   Caio Thompson MD   Vitamin D, Ergocalciferol, 39260 units capsule Take 50,000 Units by mouth 1 (One) Time Per Week. Take on Wednesday  Indications: Kidney Failure Syndrome 1/1/21   Caio Thompson MD   insulin aspart (novoLOG FLEXPEN) 100 UNIT/ML solution pen-injector sc pen Inject 30 Units under the skin into the appropriate area as directed 3 (Three) Times a Day With Meals. 2/1/22 3/17/22  Blake Burris MD       Review of Systems:  Review of Systems   Constitutional: Positive for fatigue. Negative for chills and fever.   HENT: Negative.  Negative for congestion.    Eyes: Negative.    Respiratory: Negative.  Negative for cough and shortness of breath.    Cardiovascular: Negative.  Negative for chest pain and palpitations.   Gastrointestinal: Positive for nausea and vomiting. Negative for abdominal distention and abdominal pain.   Endocrine: Negative.    Genitourinary: Negative.    Musculoskeletal: Negative.         Left foot pain   Skin: Positive for wound.   Neurological: Negative.  Negative for dizziness and weakness.   Hematological: Negative.    Psychiatric/Behavioral: Negative.       Otherwise complete ROS is negative except as mentioned above.    Physical Exam:   Temp:  [96.3 °F (35.7 °C)] 96.3 °F (35.7 °C)  Heart Rate:  [72] 72  Resp:  [20] 20  BP: (157)/(73) 157/73  Physical Exam  Vitals and nursing note reviewed.   Constitutional:       Appearance: She is obese.   HENT:      Head: Normocephalic and atraumatic.      Right Ear: External ear normal.      Left Ear: External ear normal.      Nose: Nose normal.      Mouth/Throat:      Mouth: Mucous membranes are moist.      Pharynx: Oropharynx is clear.   Eyes:      General: No scleral icterus.      Extraocular Movements: Extraocular movements intact.      Conjunctiva/sclera: Conjunctivae normal.      Pupils: Pupils are equal, round, and reactive to light.   Cardiovascular:      Rate and Rhythm: Normal rate and regular rhythm.      Heart sounds: No murmur heard.  Pulmonary:      Effort: Pulmonary effort is normal.      Breath sounds: Normal breath sounds.   Abdominal:      General: Bowel sounds are normal.      Palpations: Abdomen is soft.   Musculoskeletal:         General: Normal range of motion.      Cervical back: Normal range of motion and neck supple.      Left lower leg: Edema present.   Skin:     General: Skin is warm and dry.      Capillary Refill: Capillary refill takes less than 2 seconds.      Comments: Left foot bandage clean dry intact.  Right foot bandage clean dry intact.  Left foot is noted with transmetatarsal amputation.   Neurological:      General: No focal deficit present.      Mental Status: She is alert and oriented to person, place, and time.   Psychiatric:         Mood and Affect: Mood normal.         Behavior: Behavior normal.           Results Reviewed:  I have personally reviewed current lab, radiology, and data and agree with results.  Lab Results (last 24 hours)     Procedure Component Value Units Date/Time    Urine Drug Screen - Urine, Clean Catch [[id number]] Collected: 12/15/22 1721    Specimen: Urine, Clean Catch Updated: 12/15/22 1722    Hepatitis B Surface Antigen [[id number]] Collected: 12/15/22 1658    Specimen: Blood Updated: 12/15/22 1719    Lipid Panel [[id number]] Collected: 12/15/22 1658    Specimen: Blood Updated: 12/15/22 1719    Basic Metabolic Panel [[id number]] Collected: 12/15/22 1658    Specimen: Blood Updated: 12/15/22 1719    Hemoglobin A1c [[id number]] Collected: 12/15/22 1657    Specimen: Blood Updated: 12/15/22 1719    Vitamin D,25-Hydroxy [[id number]] Collected: 12/15/22 1658    Specimen: Blood Updated: 12/15/22 1719    POC Glucose Once [[id number]]  (Abnormal)  Collected: 12/15/22 1657    Specimen: Blood Updated: 12/15/22 1714     Glucose 67 mg/dL      Comment: : [id number] MINERVA Ray ID: [id number]           Imaging Results (Last 24 Hours)     ** No results found for the last 24 hours. **            Assessment:    Active Hospital Problems    Diagnosis    • **Venous insufficiency          Impression/plan  Left foot necrosis.  Place the patient on IV antibiotics.  Cefepime, vancomycin pharmacy to dose, and Flagyl.  Obtain blood cultures.  Continue follow labs.  CBC complete metabolic profile tonight and in a.m.  Check chest x-ray and EKG.  Pharmacy to dose vancomycin.  Consult Dr. White for surgical intervention.  Below-knee amputation.    End-stage renal disease.  Consult Dr. Gupta for arrangements for dialysis which is usually completed Monday Wednesday and Friday.    Insulin-dependent diabetes mellitus type 2.  We will check A1c.  Check TSH.  Place the patient on sliding scale and ADA diet n.p.o. after midnight.    Hyperlipidemia continue Lipitor.    Diabetic neuropathy continue Cymbalta and gabapentin    Hypertension continue losartan    Chronic pain continue oxycodone    GERD continue Protonix    Morbid obesity BMI is 47.  Continue nutritional support.    DVT prophylaxis Heparin    CODE STATUS full code      I confirmed that the patient's Advance Care Plan is present, code status is documented, or surrogate decision maker is listed in the patient's medical record.     I have utilized all available immediate resources to obtain, update, or review the patient's current medications.     I discussed the patient's findings and my recommendations with: The patient and her sister in detail.  They agree with current care plan.    Juvenal Roberson DO          Electronically signed by Juvenal Roberson DO at 12/15/22 2216     Huy White MD at 12/13/22 1400            Yamileth Slater is a 62 y.o. female   Primary provider:  Steve  BRIDGETT Rose       Chief Complaint   Patient presents with   • Left Foot - Initial Evaluation, Pain       HISTORY OF PRESENT ILLNESS:    The patient is a 62-year-old female with a long, complicated history of chronic respiratory failure on home oxygen, diabetes mellitus type 2, morbid obesity, end-stage renal disease on dialysis, coronary artery disease and worsening wounds on both feet.  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  The left foot is what has been getting worse recently.  She was recently evaluated by vascular and had very little blood flow below her left knee and therefore no further surgical intervention was warranted or recommended on her left foot.  She has a large open wound and has been doing dressing changes.  She wishes to discuss definitive treatment options for her left foot.  She reports pain all the time.  She is supposed to be limited weightbearing, however, both feet are involved which makes mobility very challenging.  She wants to be mobile and she wants to be more active.    Pain  This is a new problem. Episode onset: 2 weeks ago. Associated symptoms include chest pain, nausea, numbness and vomiting. Pertinent negatives include no chills or fever. Associated symptoms comments: Redness and swelling. She has tried NSAIDs and acetaminophen for the symptoms.        CONCURRENT MEDICAL HISTORY:    Past Medical History:   Diagnosis Date   • Acute blood loss anemia 4/16/2017    Likely due to gastric oozing at this time. - Dr. Duarte (GI) was consulted and has now signed off, will follow up outpatient - pill colonoscopy showed AVMs - continue to monitor   • Acute cystitis with hematuria 3/31/2021    1/13: IV Rocephin 1 gm q 24 1/14 : transitioned  to omnicef 300mg. Urine cultures resulted and did not show growth. Omnicef discontinued as patient is asymptomatic   • Altered mental status 1/9/2022    - AMS on presentation -  initial ABG pH 7.3, CO2 34 - Procal 0.29 - UA negative for acute cysitits -CTA head wnl  - Empiric Zosyn and Vancomycin -Lactate 2.5 on admission  - blood cultures no growth at 24 hours     • Anxiety    • CAD (coronary artery disease) 4/24/2021    S/P 3 stents 5/1/2021 for [facility] Continue ASA 81mg & Clopidogrel 75mg Continue Atorvastatin 40mg   • Carotid artery stenosis    • Chronic obstructive lung disease ([facility])    • CKD (chronic kidney disease) stage 4, GFR 15-29 ml/min ([facility])    • CKD (chronic kidney disease), symptom management only, stage 5 ([facility]) 10/5/2020    Results from last 7 days Lab Units 12/15/21 0548 12/14/21 1323 12/14/21 0916 CREATININE mg/dL 3.92* 3.21* 3.32*  Baseline creatinine 2-3 GFR 13-25 GFR 15 Dialysis MWF, sees Dr. Lauren Nephrology consult,, appreciate recommendations Continue Bumex 1mg bid daily Holding Bumex 2mg 4 times a week   • Colonic polyp    • Coronary arteriosclerosis    • Diabetes mellitus ([facility])    • Diabetic neuropathy ([facility])    • Ear pain, right 10/18/2021    - canal trauma due to patient scratching and DMT2 - added cortisporin ear drops   • Elevated troponin 10/12/2021    -most likely from CKD -Trending down -Neg chest pain   • Generalized abdominal pain 7/1/2022    Could be due to initiation of tube feeds vs dyspepsia vs abdominal cramps related to no PO intake due to intubation vs constipation Continue current laxative regiment  If no bowel movement by this afternoon will consider enema   • GERD (gastroesophageal reflux disease)    • GI bleed 5/13/2021    - GI will follow up outpatient - Protonix 40mg daily - Avoid medical DVT prophy and use mechanical at this time instead. - Continue to monitor - pill colonoscopy results showed AVMs   • History of transfusion    • Hypercholesterolemia    • Hyperosmolar hyperglycemic state (HHS) ([facility]) 6/25/2022    Serum glucose 605 on admission  Anion gap 16 PH 7.37 Bicarb 27.9 Continue fluids  Insulin drip with HHS protocol  Anion gap closed around  10 AM, received one dose of Levamir subq, will stop insulin drip after 2 hrs     • Hypertension    • Hypokalemia 5/27/2022    Will replace as needed. Will be cautious in the setting of ESRD to avoid need for emergency dialysis   Monitor Qtc intervals on EKG     • Hypomagnesemia 6/27/2021    Monitor and replace   • Morbid obesity ([facility])    • Nephrolithiasis    • On mechanically assisted ventilation ([facility]) 6/26/2022    Vent management and sedation orders placed.  - [facility] intensivist group consulted for vent management appreciate recommendations  - plan to extubate today     • Peripheral vascular disease ([facility])    • Pleural effusion on right 6/26/2022    CXR on 6/30/22 read as a small upper left pulmonary edema vs early pneumonia.  Last Echo 1/2022 EF 61-65 % Continue to monitor  Procal slightly improved, CRP improved On Linezolid and meropenum      • SIRS (systemic inflammatory response syndrome) ([facility]) 1/9/2022    Admission  - WBC 17.78   -   - RR 16 - 1/10: VSS/wnl - CXR - Mild pulm edema - Blood cultures no growth at 24 hours  - Procalcitonin 0.29 - UA : glucose 1000, negative Leucocytes/nitrate - Empiric Zosyn and Vancomcyin    • Sleep apnea    • Substance abuse ([facility])    • Vitamin D deficiency        Allergies   Allergen Reactions   • Adhesive Tape Hives, Other (See Comments) and Rash   • Nsaids Hives   • Latex Other (See Comments) and Hives   • Other Itching     Bandaids, MRSA, SURECLOSE   • Wound Dressing Adhesive Hives and Rash         Current Outpatient Medications:   •  acetaminophen (Tylenol 8 Hour) 650 MG 8 hr tablet, Take 1 tablet by mouth Every 8 (Eight) Hours As Needed for Mild Pain ., Disp: 90 tablet, Rfl: 0  •  albuterol (PROVENTIL) (2.5 MG/3ML) 0.083% nebulizer solution, Inhale the contents of 1 vial by nebulization Every 4 (Four) Hours As Needed for Wheezing., Disp: 75 mL, Rfl: 3  •  albuterol sulfate  (90 Base) MCG/ACT inhaler, Inhale 2 puffs Every 4 (Four) Hours As Needed for  Wheezing., Disp: 18 g, Rfl: 1  •  atorvastatin (LIPITOR) 20 MG tablet, Take 1 tablet by mouth every night at bedtime., Disp: 90 tablet, Rfl: 0  •  B Complex-C-Folic Acid (RENAL VITAMIN PO), Take 1 tablet by mouth Daily., Disp: , Rfl:   •  Blood Glucose Monitoring Suppl ([employer] Blood Glucose Meter) w/Device kit, 1 each 3 (Three) Times a Day., Disp: 1 kit, Rfl: 3  •  Capsaicin 0.035 % cream, Apply 3 g topically 3 (Three) Times a Day As Needed (ankle pain)., Disp: 42.5 g, Rfl: 2  •  Cetirizine HCl 10 MG capsule, Take 1 capsule by mouth Daily., Disp: , Rfl:   •  clopidogrel (PLAVIX) 75 MG tablet, Take 1 tablet by mouth Daily., Disp: 30 tablet, Rfl: 5  •  collagenase (Santyl) 250 UNIT/GM ointment, Apply 1 application topically to the appropriate area as directed Daily., Disp: 90 g, Rfl: 2  •  CYCLOBENZAPRINE HCL PO, Take 5 mg by mouth Daily., Disp: , Rfl:   •  Diclofenac Sodium (VOLTAREN) 1 % gel gel, Apply 4 g topically to the appropriate area as directed 4 (Four) Times a Day As Needed (Ankle pain)., Disp: 350 g, Rfl: 2  •  docusate sodium (COLACE) 100 MG capsule, Take 100 mg by mouth 2 (Two) Times a Day., Disp: , Rfl:   •  DULoxetine (CYMBALTA) 20 MG capsule, Take 20 mg by mouth Daily. Indications: Disease of the Peripheral Nerves, Disp: , Rfl:   •  Easy Touch Insulin Syringe 30G X 5/16\" 0.5 ML misc, USE AS DIRECTED WITH LEVEMIR, Disp: , Rfl:   •  EASY TOUCH PEN NEEDLES 31G X 8 MM misc, , Disp: , Rfl:   •  gabapentin (NEURONTIN) 300 MG capsule, TAKE 1 CAPSULE BY MOUTH DAILY. AND AN EXTRA PILL MONDAY/WEDNESDAY/FRIDAY - DIALYSIS DAYS, Disp: 45 capsule, Rfl: 2  •  glucose blood test strip, Use as instructed, Disp: 100 each, Rfl: 12  •  hydrOXYzine (ATARAX) 25 MG tablet, Take 25 mg by mouth Every 8 (Eight) Hours As Needed for Itching., Disp: , Rfl:   •  insulin detemir (LEVEMIR) 100 UNIT/ML injection, Inject 25 Units under the skin into the appropriate area as directed Every Night. (Patient taking differently: Inject 24 Units  under the skin into the appropriate area as directed 2 (Two) Times a Day. Indications: Type 2 Diabetes), Disp: 3 mL, Rfl: 12  •  Insulin Lispro, 1 Unit Dial, (HUMALOG) 100 UNIT/ML solution pen-injector, Inject 12 Units under the skin into the appropriate area as directed 3 (Three) Times a Day With Meals. (Patient taking differently: Inject 14 Units under the skin into the appropriate area as directed 3 (Three) Times a Day With Meals. Sliding scale:  150-199 take 4 units 200-249 take 8 units 250-299 take 12 units 300-349 take 16 units 350-400 take 20 units greater than 400, call physician), Disp: 30 mL, Rfl: 12  •  Insulin Pen Needle (NovoFine) 30G X 8 MM misc, As directed 4 times daily, Disp: 100 each, Rfl: 11  •  Insulin Pen Needle 31G X 8 MM misc, Use to inject insulin 4 (Four) Times a Day as directed., Disp: 120 each, Rfl: 12  •  ipratropium-albuterol (DUO-NEB) 0.5-2.5 mg/3 ml nebulizer, Take 3 mL by nebulization 4 (Four) Times a Day., Disp: , Rfl:   •  memantine (NAMENDA) 5 MG tablet, Take 5 mg by mouth 2 (Two) Times a Day., Disp: , Rfl:   •  montelukast (SINGULAIR) 10 MG tablet, Take 1 tablet by mouth Every Night., Disp: 90 tablet, Rfl: 0  •  nitroglycerin (NITROSTAT) 0.4 MG SL tablet, Place 0.4 mg under the tongue Every 5 (Five) Minutes As Needed for Chest Pain (x 3 doses)., Disp: , Rfl:   •  O2 (OXYGEN), Inhale 3 L/min Continuous., Disp: , Rfl:   •  ondansetron ODT (Zofran ODT) 4 MG disintegrating tablet, Place 1 tablet on the tongue Every 8 (Eight) Hours As Needed for Nausea or Vomiting., Disp: 30 tablet, Rfl: 0  •  oxyCODONE (ROXICODONE) 10 MG tablet, Take 10 mg by mouth 4 (Four) Times a Day As Needed for Moderate Pain or Severe Pain., Disp: , Rfl:   •  pantoprazole (PROTONIX) 40 MG EC tablet, Take 1 tablet by mouth Every Night., Disp: 90 tablet, Rfl: 0  •  promethazine (PHENERGAN) 25 MG tablet, Take 25 mg by mouth Every 6 (Six) Hours As Needed for Nausea or Vomiting., Disp: , Rfl:   •  sevelamer (RENVELA)  800 MG tablet, Take 800 mg by mouth 3 (Three) Times a Day With Meals., Disp: , Rfl:   •  Vitamin D, Ergocalciferol, 76508 units capsule, Take 50,000 Units by mouth 1 (One) Time Per Week. Take on Wednesday  Indications: Kidney Failure Syndrome, Disp: , Rfl:   •  DULoxetine (CYMBALTA) 20 MG capsule, Take 2 capsules by mouth Daily for 90 days., Disp: 180 capsule, Rfl: 0  •  losartan (COZAAR) 100 MG tablet, Take 100 mg by mouth Daily., Disp: , Rfl:   •  Methoxy PEG-Epoetin Beta (MIRCERA IJ), 225 mcg Every 14 (Fourteen) Days., Disp: , Rfl:   •  torsemide (DEMADEX) 20 MG tablet, Take 20 mg by mouth 2 (Two) Times a Day., Disp: , Rfl:     Past Surgical History:   Procedure Laterality Date   • CARDIAC CATHETERIZATION N/A 7/14/2020   • CARDIAC CATHETERIZATION N/A 4/23/2021    Procedure: Left Heart Cath;  Surgeon: Melba Romo MD;  Location: [facility] CATH INVASIVE LOCATION;  Service: Cardiology;  Laterality: N/A;   • CARDIAC CATHETERIZATION N/A 4/30/2021    Procedure: Percutaneous Coronary Intervention;  Surgeon: Russell Voss MD;  Location: [facility] INVASIVE LOCATION;  Service: Cardiovascular;  Laterality: N/A;   • CARDIAC CATHETERIZATION N/A 4/30/2021    Procedure: Stent NIKKI coronary;  Surgeon: Russell Voss MD;  Location: [facility] CATH INVASIVE LOCATION;  Service: Cardiovascular;  Laterality: N/A;   • CARDIAC CATHETERIZATION Left 11/13/2021    Procedure: Left Heart Cath;  Surgeon: Niall Rios MD;  Location: [facility] CATH INVASIVE LOCATION;  Service: Cardiology;  Laterality: Left;   • CAROTID STENT Left    • COLONOSCOPY     • COLONOSCOPY N/A 5/14/2021    Procedure: COLONOSCOPY;  Surgeon: Mingo Duarte MD;  Location: [facility] ENDOSCOPY;  Service: Gastroenterology;  Laterality: N/A;   • CORONARY ARTERY BYPASS GRAFT N/A 2013    CABG X 3   • CYSTOSCOPY BLADDER STONE LITHOTRIPSY Bilateral    • ENDOSCOPY N/A 4/12/2021    Procedure: ESOPHAGOGASTRODUODENOSCOPY;  Surgeon: Mingo Duarte MD;   Location: [facility] ENDOSCOPY;  Service: Gastroenterology;  Laterality: N/A;   • ENDOSCOPY N/A 5/14/2021    Procedure: ESOPHAGOGASTRODUODENOSCOPY;  Surgeon: Mingo Duarte MD;  Location: [facility] ENDOSCOPY;  Service: Gastroenterology;  Laterality: N/A;   • ENDOSCOPY N/A 1/28/2022    Procedure: ESOPHAGOGASTRODUODENOSCOPY;  Surgeon: Mingo Duarte MD;  Location: [facility] ENDOSCOPY;  Service: Gastroenterology;  Laterality: N/A;   • INTERVENTIONAL RADIOLOGY PROCEDURE N/A 10/21/2021    Procedure: tunneled central venous catheter placement;  Surgeon: Donnie Robles MD;  Location: [facility] ANGIO INVASIVE LOCATION;  Service: Interventional Radiology;  Laterality: N/A;   • INTERVENTIONAL RADIOLOGY PROCEDURE N/A 1/27/2022    Procedure: tunneled central venous catheter placement;  Surgeon: Donnie Robles MD;  Location: [facility] INVASIVE LOCATION;  Service: Interventional Radiology;  Laterality: N/A;       Family History   Problem Relation Age of Onset   • Heart disease Mother    • Lung cancer Mother    • Heart disease Father    • Heart attack Father    • Diabetes Father    • Heart disease Half-Sister         Dad's side   • Heart disease Brother    • No Known Problems Sister    • No Known Problems Sister    • No Known Problems Sister    • No Known Problems Sister    • No Known Problems Sister    • Pancreatic cancer Half-Sister         Dad's side   • No Known Problems Brother    • No Known Problems Brother    • Heart attack Half-Brother    • Heart attack Half-Brother    • No Known Problems Maternal Grandmother    • No Known Problems Maternal Grandfather    • No Known Problems Paternal Grandmother    • No Known Problems Paternal Grandfather        Social History     Socioeconomic History   • Marital status: Legally      Spouse name: wesley dumont   Tobacco Use   • Smoking status: Former     Packs/day: 0.50     Years: 46.00     Pack years: 23.00     Types: Cigarettes     Start date: 2/1/1999     Quit  date: 2/15/2022     Years since quittin.8   • Smokeless tobacco: Never   • Tobacco comments:     only smoking 5 a day - quit 2021   Substance and Sexual Activity   • Alcohol use: No   • Drug use: Not Currently     Types: LSD, Marijuana, Methamphetamines   • Sexual activity: Not Currently        Review of Systems   Constitutional: Negative for chills and fever.   Respiratory: Positive for shortness of breath and wheezing.         Chronic home oxygen   Cardiovascular: Positive for chest pain.   Gastrointestinal: Positive for nausea and vomiting.   Musculoskeletal:        Stiffness and itching   Neurological: Positive for numbness.   All other systems reviewed and are negative.      PHYSICAL EXAMINATION:       Ht 157.5 cm (62\")   Wt 117 kg (257 lb)   LMP  (LMP Unknown)   BMI 47.01 kg/m²     Physical Exam  Vitals reviewed.   Constitutional:       General: She is not in acute distress.     Appearance: She is well-developed. She is obese.   Eyes:      Conjunctiva/sclera: Conjunctivae normal.      Pupils: Pupils are equal, round, and reactive to light.   Neck:      Trachea: No tracheal deviation.   Cardiovascular:      Rate and Rhythm: Normal rate and regular rhythm.      Heart sounds: Normal heart sounds.   Pulmonary:      Breath sounds: Wheezing present.      Comments: On home oxygen  Chest:      Chest wall: No tenderness.   Abdominal:      General: Bowel sounds are normal. There is no distension.      Palpations: Abdomen is soft. There is no mass.      Tenderness: There is no abdominal tenderness.   Musculoskeletal:      Cervical back: Neck supple. No muscular tenderness.   Lymphadenopathy:      Cervical: No cervical adenopathy.   Skin:     General: Skin is warm.      Findings: No rash.   Neurological:      Mental Status: She is alert and oriented to person, place, and time.   Psychiatric:         Behavior: Behavior normal.         Thought Content: Thought content normal.         Judgment: Judgment  normal.         GAIT:     []  Normal  [x]  Antalgic    Assistive device: []  None  []  Walker     []  Crutches  []  Cane     [x]  Wheelchair  []  Stretcher    Left Ankle Exam     Comments:  Left foot wound was evaluated and she has an open wound involving the entirety of the midfoot.  Metatarsal fragments are noted from the first second third and fourth metatarsals.  She has exudate around the skin edges.  She has necrotic tissue involving the anterior and posterior aspects of the skin flap.  She has diffuse erythema around the remainder of the foot and on the distal tibial region.  She does have hair growth from mid shin proximal.  She has good capillary refill mid shin and proximal.  She has erythema that extends up the shin about 6 cm or so proximal to the joint space.                  XR Foot 3+ View Bilateral    Result Date: 12/2/2022  Narrative: PROCEDURE: XR FOOT 3+ VW BILATERAL VIEWS: 3 views each INDICATION: Foot ulcer, osteomyelitis COMPARISON: MRI of right foot, 9/7/2022 FINDINGS: Right foot Examination is limited by bandage material overlying the first metatarsal. There is been interval first metatarsal phalangeal joint amputation.   - fracture: None identified. No definite destructive lesion. Mild soft tissue swelling overlies the metatarsals. Vascular calcification present. Calcaneal enthesophytes are seen.   - alignment: WNL   - misc: age-related degenerative changes Left foot Bandage material overlying the foot distally somewhat limits evaluation.   - fracture: No acute fracture identified. There is been amputation of the proximal metatarsals of the first through fifth rays. No definite destructive lesions are seen. There is a 1.0 x 0.6 cm osseous fragment medial to the medial cuneiform. Source of this fragment is not identified.   - alignment: WNL   - misc:  Soft tissue defect overlies the metatarsals stumps, again limited in evaluation due to overlying bandage material. Vascular calcification  present. Calcaneal enthesophyte at the origin of the plantar aponeurosis     Impression: Limited evaluation of both feet due to overlying bandage material. This particularly limits evaluation for small amounts of gas in soft tissues. Soft tissue tissue swelling is seen bilaterally, and there is probably a soft tissue defect overlying the remaining proximal left metatarsals. Clinical correlation needed.. No definite destructive lesions are identified. (Note:  if pain or symptoms persist beyond reasonable expectations and follow-up imaging is anticipated,  scintigraphic or cross sectional imaging (CT and/or MRI) is suggested, as is deemed clinically appropriate). Electronically signed by:  Kayla Mccoy MD  12/2/2022 11:40 AM CST Workstation: [id number]    Doppler Ankle Brachial Index Single Level CAR    Result Date: 12/5/2022  Narrative: •  Right Conclusion: The right SUNDEEP is severely reduced. Waveforms are consistent with aorto-iliac disease and tib-peroneal disease. •  Left Conclusion: The left SUNDEEP is severely reduced. Waveforms are consistent with femoral disease and tib-peroneal disease.     The T com's were reviewed from Friday, December 9.  The below knee sites medial and lateral were marked as 63 and 67 which seems to indicate a good possibility for healing.      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 (HCC)  -     Case Request; Standing  -     ceFAZolin (ANCEF) 2 g in sodium chloride 0.9 % 100 mL IVPB  -     Case Request    Left foot pain  -     Case Request; Standing  -     ceFAZolin (ANCEF) 2 g in sodium chloride 0.9 % 100 mL IVPB  -     Case Request    MIKE and COPD overlap syndrome (HCC)  -     Case Request; Standing  -     ceFAZolin (ANCEF) 2 g in sodium chloride 0.9 % 100 mL IVPB  -     Case Request    Bilateral carotid artery stenosis  -     Case Request; Standing  -     ceFAZolin (ANCEF) 2 g in sodium chloride 0.9 % 100 mL IVPB  -      Case Request    PAD (peripheral artery disease) ([facility])  -     Case Request; Standing  -     ceFAZolin (ANCEF) 2 g in sodium chloride 0.9 % 100 mL IVPB  -     Case Request    Venous insufficiency  -     Case Request; Standing  -     ceFAZolin (ANCEF) 2 g in sodium chloride 0.9 % 100 mL IVPB  -     Case Request    Pulmonary hypertension ([facility])  -     Case Request; Standing  -     ceFAZolin (ANCEF) 2 g in sodium chloride 0.9 % 100 mL IVPB  -     Case Request    Coronary artery disease involving native coronary artery of native heart without angina pectoris  -     Case Request; Standing  -     ceFAZolin (ANCEF) 2 g in sodium chloride 0.9 % 100 mL IVPB  -     Case Request    Morbid obesity with BMI of 45.0-49.9, adult ([facility])  -     Case Request; Standing  -     ceFAZolin (ANCEF) 2 g in sodium chloride 0.9 % 100 mL IVPB  -     Case Request    CKD (chronic kidney disease) requiring chronic dialysis ([facility])  -     Case Request; Standing  -     ceFAZolin (ANCEF) 2 g in sodium chloride 0.9 % 100 mL IVPB  -     Case Request    On home oxygen therapy  -     Case Request; Standing  -     ceFAZolin (ANCEF) 2 g in sodium chloride 0.9 % 100 mL IVPB  -     Case Request    Other orders  -     Follow Anesthesia Guidelines / Protocol; Future  -     Follow Anesthesia Guidelines / Protocol; Standing  -     Verify NPO Status; Standing  -     Obtain informed consent (if not collected inpatient or PAT); Standing  -     Provide instructions to patient regarding NPO status; Future          PLAN    Long discussion was held with the patient and her family member regarding further treatment options.  She has had multiple debridements involving her foot and has had multiple revascularization attempts for distal blood supply in the left leg.  All have failed and she now has a large open wound involving basically an open transmetatarsal amputation.  Wound VAC has been discontinued previously.  She has necrotic tissue that is present and has recent  vascular studies that show she essentially has no flow distal to the mid tibial region.  Patient was referred for evaluation for proximal amputation.    In this setting, she does have hair growth on her shin and does have good capillary refill involving most of the proximal two thirds of the tib-fib region.  We discussed the possibility of below-knee amputation especially in light of favorable numbers on the T com evaluation.  We did, of course, discussed that in the presence of smoking and with known peripheral vascular disease it is possible that we would not heal the below-knee amputation and have to progress to an above-knee amputation.  Patient understands this risk and agrees to pursue below-knee amputation due to improved functional outcome.    Patient is extremely high risk due to smoking, diabetes mellitus, COPD with chronic home oxygen, pulmonary hypertension, morbid obesity, and end-stage renal disease on dialysis.    The patient voiced understanding of the risks, benefits, and alternative forms of treatment that were discussed and the patient consents to proceed with surgery.  All risks, benefits and alternatives were discussed.  Risks include, but not exclusive to anesthetic complications, including death, MI, CVA, infection, bleeding DVT, fracture, residual pain and need for future surgery.    This discussion was held with the patient by Huy White MD and all questions were answered.    Plan left below-knee amputation.    Discussions were held with Dr. Oliveira in regards to obtaining the T com evaluation as well as prior wound care treatment.  Discussion also held with the hospitalist service for logistics on hospital admission and perioperative care with the logistics of all of her medical issues.  Discussion was also held with Dr. Gupta with the renal service to schedule and plan for dialysis at the hospital prior to surgical intervention.  All are in agreement to proceed.    Over an hour was  spent with the patient in face-to-face discussions and surgical risk stratification, chart review to determine the extent of medical conditions, and in discussions with the above-mentioned physicians for strategizing the logistics of her care.    Return for Post-operative eval.    Huy White MD        Electronically signed by Huy White MD at 12/13/22 1745          Physician Progress Notes (last 24 hours)      Jef Ramires MD at 12/21/22 1528              [facility] Medicine Services  INPATIENT PROGRESS NOTE    Length of Stay: 6  Date of Admission: 12/15/2022  Primary Care Physician: Kiersten Wilson APRN    Subjective   Chief Complaint: Left foot pain secondary to necrosis  HPI: Much more oriented today.  Mental status much better.    As of today, 12/21/22  Review of Systems   Unable to perform ROS: Mental status change        All pertinent negatives and positives are as above. All other systems have been reviewed and are negative unless otherwise stated.    Objective    Temp:  [97 °F (36.1 °C)-98.3 °F (36.8 °C)] 98.3 °F (36.8 °C)  Heart Rate:  [] 94  Resp:  [16-20] 18  BP: (115-170)/(54-88) 119/88    AM-PAC 6 Clicks Score (PT): 11 (12/21/22 1206)    As of today, 12/21/22  Physical Exam  Vitals reviewed.   Constitutional:       Appearance: She is well-developed.   HENT:      Head: Normocephalic and atraumatic.   Eyes:      Pupils: Pupils are equal, round, and reactive to light.   Cardiovascular:      Rate and Rhythm: Normal rate and regular rhythm.      Heart sounds: Normal heart sounds. No murmur heard.    No friction rub. No gallop.   Pulmonary:      Effort: Pulmonary effort is normal. No respiratory distress.      Breath sounds: Normal breath sounds. No wheezing or rales.   Chest:      Chest wall: No tenderness.   Abdominal:      General: Bowel sounds are normal. There is no distension.      Palpations: Abdomen is soft.       Tenderness: There is no abdominal tenderness. There is no guarding.   Musculoskeletal:      Cervical back: Normal range of motion and neck supple.      Comments: Left BKA.  Dressing clean dry and intact.   Skin:     General: Skin is warm and dry.   Neurological:      Mental Status: She is alert.      Comments: Much more interactive today.  More oriented.   Psychiatric:         Speech: Speech normal.         Behavior: Behavior normal.      Comments: Much more interactive today.           Results Review:  I have reviewed the labs, radiology results, and diagnostic studies.    Laboratory Data:   Results from last 7 days   Lab Units 12/21/22  0641 12/20/22  0522 12/19/22  0600   SODIUM mmol/L 132* 135* 133*   POTASSIUM mmol/L 4.5 5.0 4.3   CHLORIDE mmol/L 91* 92* 95*   CO2 mmol/L 21.0* 22.0 22.0   BUN mg/dL 68* 47* 57*   CREATININE mg/dL 6.07* 4.43* 4.98*   GLUCOSE mg/dL 270* 244* 201*   CALCIUM mg/dL 9.8 10.0 9.9   BILIRUBIN mg/dL 0.3 0.4 0.4   ALK PHOS U/L 203* 241* 252*   ALT (SGPT) U/L <5 <5 <5   AST (SGOT) U/L 9 11 16   ANION GAP mmol/L 20.0* 21.0* 16.0*     Estimated Creatinine Clearance: 10.9 mL/min (A) (by C-G formula based on SCr of 6.07 mg/dL (H)).  Results from last 7 days   Lab Units 12/16/22  0259   MAGNESIUM mg/dL 1.9   PHOSPHORUS mg/dL 8.9*         Results from last 7 days   Lab Units 12/21/22  0641 12/20/22  0522 12/19/22  0600 12/18/22  0825 12/18/22  0549   WBC 10*3/mm3 12.79* 13.52* 9.77 10.68 10.48   HEMOGLOBIN g/dL 12.5 13.1 12.3 12.7 12.8   HEMATOCRIT % 40.3 42.6 38.7 41.4 42.2   PLATELETS 10*3/mm3 251 281 259 253 261           Culture Data:   No results found for: BLOODCX  No results found for: URINECX  No results found for: RESPCX  No results found for: WOUNDCX  No results found for: STOOLCX  No components found for: BODYFLD    Radiology Data:   Imaging Results (Last 24 Hours)     ** No results found for the last 24 hours. **          I have reviewed the patient's current medications.      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  Left foot necrosis: Status post BKA.  Continue antibiotics.  2.  End-stage renal disease: Continue hemodialysis.  Appreciate nephrology help.  3.  Diabetes mellitus: Continue current.  4.  Hypertension: Continue losartan.  5.  Altered was: Improving.  Likely anesthesia reaction.  Patient's sister states that this is common for her.  6.  Chronic pain: Continue current pain management for now.  7.  DVT prophylaxis: Heparin    The patient was evaluated during the global COVID-19 pandemic, and the diagnosis was suspected/considered upon their initial presentation.  Evaluation, treatment, and testing were consistent with current guidelines for patients who present with complaints or symptoms that may be related to COVID-19.    I confirmed that the patient's Advance Care Plan is present, code status is documented, or surrogate decision maker is listed in the patient's medical record.       Discharge Planning: I expect patient to be discharged to skilled facility versus LTAC in 2-3 days.        This document has been electronically signed by Jef Ramires MD on December 21, 2022 15:41 CST        Electronically signed by Jef Ramires MD at 12/21/22 1543     Huy White MD at 12/21/22 0904          ORTHOPEDIC PROGRESS NOTE:    Name:  Yamileth Slater  Date:    12/21/2022  Date of admission:  12/15/2022    Post op day:  5 Days Post-Op  Procedure:     Procedure(s) (LRB):  AMPUTATION BELOW KNEE, LEFT (Left)    Subjective:  Only complaint is that she wants the restraints removed.  She is alert today  No fever or chills      Vitals:     Vitals:    22 0855   BP: 153/78   Pulse: 82   Resp: 20   Temp:    SpO2:       Temp (24hrs), Av.4 °F (36.3 °C), Min:97 °F (36.1 °C), Max:97.9 °F (36.6 °C)      Exam:  Awake and alert  Oriented to person, place, and situation  Dressing left BKA site intact.    Results from last 7 days   Lab Units 22  0641 22  0522 22  0600   CRP mg/dL 7.81* 10.06* 12.95*     Results from last 7 days   Lab Units 22  0641 22  0522 22  0600   WBC 10*3/mm3 12.79* 13.52* 9.77   HEMOGLOBIN g/dL 12.5 13.1 12.3   HEMATOCRIT % 40.3 42.6 38.7   PLATELETS 10*3/mm3 251 281 259         ASSESSMENT:  Active Hospital Problems    Diagnosis  POA   • **Venous insufficiency [I87.2]  Yes   • Left foot pain [M79.672]  Yes     Added automatically from request for surgery [id number]     • 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 ([facility]) [L97.524]  Yes     Added automatically from request for surgery [id number]     • On home oxygen therapy [Z99.81]  Not Applicable     Added automatically from request for surgery [id number]     • ESRD on hemodialysis ([facility]) [N18.6, Z99.2]  Not Applicable     -Receives hemodialysis MWF at [facility] in [place]  Missed dialysis per family prior to arrival   Nephrology consulted appreciated recs          • MIKE and COPD overlap syndrome ([facility]) [G47.33, J44.9]  Yes     -Home Trilogy/CPAP continue   -Home 3L oxygen dependent. Maintain strict Sats between 88-92%       • Pulmonary hypertension ([facility]) [I27.20]  Yes   • Coronary artery disease involving native coronary artery of native heart without angina pectoris [I25.10]  Yes     - S/P CABG, Bilateral carotid artery stenosis w/ 2 stents on left side,  PAD (peripheral artery disease) with stent in right upper leg  - Cardiology on board       • Morbid obesity  with BMI of 50.0-59.9, adult ([facility]) [E66.01, Z68.43]  Not Applicable     Body mass index is 56.52 kg/m².  - Consistent carb, cardiac, renal diet     • Carotid artery disease ([facility]) [I77.9]  Yes     -Status post stenting  -Continue home medications     • PAD (peripheral artery disease) ([facility]) [I73.9]  Yes     Continue ASA 81mg, Clopidogrel 75mg, Atorvastatin 40mg           PLAN:    Slowly improving  Mental status much better today  Continue dressing changes left LE  Continue medical management  DVT prophylaxis  Mobility as tolerated  Disposition will need to consider mobility issues as well as dialysis.    12/21/22 at 09:05 CST by Huy White MD        Electronically signed by Huy White MD at 12/21/22 0907

## 2022-12-22 LAB
ALBUMIN SERPL-MCNC: 3.6 G/DL (ref 3.5–5.2)
ALBUMIN/GLOB SERPL: 0.8 G/DL
ALP SERPL-CCNC: 198 U/L (ref 39–117)
ALT SERPL W P-5'-P-CCNC: <5 U/L (ref 1–33)
ANION GAP SERPL CALCULATED.3IONS-SCNC: 21 MMOL/L (ref 5–15)
ANISOCYTOSIS BLD QL: ABNORMAL
AST SERPL-CCNC: 12 U/L (ref 1–32)
BASOPHILS # BLD MANUAL: 0.27 10*3/MM3 (ref 0–0.2)
BASOPHILS NFR BLD MANUAL: 2 % (ref 0–1.5)
BILIRUB SERPL-MCNC: 0.3 MG/DL (ref 0–1.2)
BUN SERPL-MCNC: 54 MG/DL (ref 8–23)
BUN/CREAT SERPL: 11.1 (ref 7–25)
CALCIUM SPEC-SCNC: 9.5 MG/DL (ref 8.6–10.5)
CHLORIDE SERPL-SCNC: 86 MMOL/L (ref 98–107)
CO2 SERPL-SCNC: 21 MMOL/L (ref 22–29)
CREAT SERPL-MCNC: 4.88 MG/DL (ref 0.57–1)
CRP SERPL-MCNC: 6.95 MG/DL (ref 0–0.5)
DEPRECATED RDW RBC AUTO: 50 FL (ref 37–54)
EGFRCR SERPLBLD CKD-EPI 2021: 9.5 ML/MIN/1.73
EOSINOPHIL # BLD MANUAL: 0.27 10*3/MM3 (ref 0–0.4)
EOSINOPHIL NFR BLD MANUAL: 2 % (ref 0.3–6.2)
ERYTHROCYTE [DISTWIDTH] IN BLOOD BY AUTOMATED COUNT: 16.3 % (ref 12.3–15.4)
GLOBULIN UR ELPH-MCNC: 4.8 GM/DL
GLUCOSE BLDC GLUCOMTR-MCNC: 235 MG/DL (ref 70–130)
GLUCOSE BLDC GLUCOMTR-MCNC: 271 MG/DL (ref 70–130)
GLUCOSE BLDC GLUCOMTR-MCNC: 318 MG/DL (ref 70–130)
GLUCOSE SERPL-MCNC: 354 MG/DL (ref 65–99)
HCT VFR BLD AUTO: 38.7 % (ref 34–46.6)
HGB BLD-MCNC: 12.4 G/DL (ref 12–15.9)
LYMPHOCYTES # BLD MANUAL: 4.39 10*3/MM3 (ref 0.7–3.1)
LYMPHOCYTES NFR BLD MANUAL: 9 % (ref 5–12)
MCH RBC QN AUTO: 27.2 PG (ref 26.6–33)
MCHC RBC AUTO-ENTMCNC: 32 G/DL (ref 31.5–35.7)
MCV RBC AUTO: 84.9 FL (ref 79–97)
MONOCYTES # BLD: 1.2 10*3/MM3 (ref 0.1–0.9)
NEUTROPHILS # BLD AUTO: 7.19 10*3/MM3 (ref 1.7–7)
NEUTROPHILS NFR BLD MANUAL: 54 % (ref 42.7–76)
PLATELET # BLD AUTO: 241 10*3/MM3 (ref 140–450)
PMV BLD AUTO: 9 FL (ref 6–12)
POTASSIUM SERPL-SCNC: 4 MMOL/L (ref 3.5–5.2)
PROT SERPL-MCNC: 8.4 G/DL (ref 6–8.5)
RBC # BLD AUTO: 4.56 10*6/MM3 (ref 3.77–5.28)
REF LAB TEST METHOD: NORMAL
SMALL PLATELETS BLD QL SMEAR: ADEQUATE
SODIUM SERPL-SCNC: 128 MMOL/L (ref 136–145)
VARIANT LYMPHS NFR BLD MANUAL: 1 % (ref 0–5)
VARIANT LYMPHS NFR BLD MANUAL: 32 % (ref 19.6–45.3)
WBC MORPH BLD: NORMAL
WBC NRBC COR # BLD: 13.31 10*3/MM3 (ref 3.4–10.8)

## 2022-12-22 PROCEDURE — 85007 BL SMEAR W/DIFF WBC COUNT: CPT | Performed by: ORTHOPAEDIC SURGERY

## 2022-12-22 PROCEDURE — 85027 COMPLETE CBC AUTOMATED: CPT | Performed by: ORTHOPAEDIC SURGERY

## 2022-12-22 PROCEDURE — 82962 GLUCOSE BLOOD TEST: CPT

## 2022-12-22 PROCEDURE — 63710000001 INSULIN DETEMIR PER 5 UNITS: Performed by: HOSPITALIST

## 2022-12-22 PROCEDURE — 99024 POSTOP FOLLOW-UP VISIT: CPT | Performed by: ORTHOPAEDIC SURGERY

## 2022-12-22 PROCEDURE — 80053 COMPREHEN METABOLIC PANEL: CPT | Performed by: ORTHOPAEDIC SURGERY

## 2022-12-22 PROCEDURE — 97110 THERAPEUTIC EXERCISES: CPT

## 2022-12-22 PROCEDURE — 97535 SELF CARE MNGMENT TRAINING: CPT

## 2022-12-22 PROCEDURE — 97530 THERAPEUTIC ACTIVITIES: CPT

## 2022-12-22 PROCEDURE — 25010000002 HEPARIN (PORCINE) PER 1000 UNITS: Performed by: ORTHOPAEDIC SURGERY

## 2022-12-22 PROCEDURE — 86140 C-REACTIVE PROTEIN: CPT | Performed by: ORTHOPAEDIC SURGERY

## 2022-12-22 PROCEDURE — 25010000002 HYDROMORPHONE 1 MG/ML SOLUTION: Performed by: ORTHOPAEDIC SURGERY

## 2022-12-22 PROCEDURE — 63710000001 INSULIN ASPART PER 5 UNITS: Performed by: ORTHOPAEDIC SURGERY

## 2022-12-22 RX ADMIN — DOCUSATE SODIUM 200 MG: 100 CAPSULE, LIQUID FILLED ORAL at 20:12

## 2022-12-22 RX ADMIN — GABAPENTIN 300 MG: 300 CAPSULE ORAL at 20:11

## 2022-12-22 RX ADMIN — GABAPENTIN 300 MG: 300 CAPSULE ORAL at 09:18

## 2022-12-22 RX ADMIN — CARVEDILOL 6.25 MG: 6.25 TABLET, FILM COATED ORAL at 09:18

## 2022-12-22 RX ADMIN — SEVELAMER CARBONATE 800 MG: 800 TABLET, FILM COATED ORAL at 11:56

## 2022-12-22 RX ADMIN — SEVELAMER CARBONATE 800 MG: 800 TABLET, FILM COATED ORAL at 18:27

## 2022-12-22 RX ADMIN — HYDROMORPHONE HYDROCHLORIDE 0.5 MG: 1 INJECTION, SOLUTION INTRAMUSCULAR; INTRAVENOUS; SUBCUTANEOUS at 19:31

## 2022-12-22 RX ADMIN — LORAZEPAM 0.5 MG: 0.5 TABLET ORAL at 02:56

## 2022-12-22 RX ADMIN — Medication 10 ML: at 19:36

## 2022-12-22 RX ADMIN — ATORVASTATIN CALCIUM 20 MG: 20 TABLET, FILM COATED ORAL at 20:11

## 2022-12-22 RX ADMIN — Medication 10 ML: at 20:12

## 2022-12-22 RX ADMIN — MEMANTINE 5 MG: 5 TABLET ORAL at 09:18

## 2022-12-22 RX ADMIN — MEMANTINE 5 MG: 5 TABLET ORAL at 20:12

## 2022-12-22 RX ADMIN — HEPARIN SODIUM 5000 UNITS: 5000 INJECTION INTRAVENOUS; SUBCUTANEOUS at 14:34

## 2022-12-22 RX ADMIN — FUROSEMIDE 80 MG: 40 TABLET ORAL at 09:18

## 2022-12-22 RX ADMIN — OXYCODONE HYDROCHLORIDE 10 MG: 10 TABLET ORAL at 02:51

## 2022-12-22 RX ADMIN — SEVELAMER CARBONATE 800 MG: 800 TABLET, FILM COATED ORAL at 09:18

## 2022-12-22 RX ADMIN — PANTOPRAZOLE SODIUM 40 MG: 40 TABLET, DELAYED RELEASE ORAL at 20:12

## 2022-12-22 RX ADMIN — HEPARIN SODIUM 5000 UNITS: 5000 INJECTION INTRAVENOUS; SUBCUTANEOUS at 05:39

## 2022-12-22 RX ADMIN — INSULIN ASPART 9 UNITS: 100 INJECTION, SOLUTION INTRAVENOUS; SUBCUTANEOUS at 11:56

## 2022-12-22 RX ADMIN — INSULIN ASPART 6 UNITS: 100 INJECTION, SOLUTION INTRAVENOUS; SUBCUTANEOUS at 18:27

## 2022-12-22 RX ADMIN — INSULIN DETEMIR 20 UNITS: 100 INJECTION, SOLUTION SUBCUTANEOUS at 13:25

## 2022-12-22 RX ADMIN — INSULIN DETEMIR 20 UNITS: 100 INJECTION, SOLUTION SUBCUTANEOUS at 20:10

## 2022-12-22 RX ADMIN — INSULIN ASPART 7 UNITS: 100 INJECTION, SOLUTION INTRAVENOUS; SUBCUTANEOUS at 09:18

## 2022-12-22 RX ADMIN — OXYCODONE HYDROCHLORIDE 10 MG: 10 TABLET ORAL at 09:23

## 2022-12-22 RX ADMIN — HEPARIN SODIUM 5000 UNITS: 5000 INJECTION INTRAVENOUS; SUBCUTANEOUS at 21:00

## 2022-12-22 RX ADMIN — LOSARTAN POTASSIUM 25 MG: 25 TABLET, FILM COATED ORAL at 09:18

## 2022-12-22 RX ADMIN — DOCUSATE SODIUM 200 MG: 100 CAPSULE, LIQUID FILLED ORAL at 09:18

## 2022-12-22 RX ADMIN — DULOXETINE HYDROCHLORIDE 20 MG: 20 CAPSULE, DELAYED RELEASE ORAL at 09:18

## 2022-12-22 RX ADMIN — OXYCODONE HYDROCHLORIDE 10 MG: 10 TABLET ORAL at 14:34

## 2022-12-22 RX ADMIN — CARVEDILOL 6.25 MG: 6.25 TABLET, FILM COATED ORAL at 18:27

## 2022-12-22 NOTE — THERAPY TREATMENT NOTE
Acute Care - Physical Therapy Treatment Note  [facility]     Patient Name: Yamileth Slater  : 1960  MRN: [id number]  Today's Date: 2022      Visit Dx:     ICD-10-CM ICD-9-CM   1. Venous insufficiency  I87.2 459.81   2. Pulmonary hypertension ([facility])  I27.20 416.8   3. Left foot pain  M79.672 729.5   4. PAD (peripheral artery disease) ([facility])  I73.9 443.9   5. On home oxygen therapy  Z99.81 V46.2   6. CKD (chronic kidney disease) requiring chronic dialysis ([facility])  N18.6 585.6    Z99.2 V45.11   7. Bilateral carotid artery stenosis  I65.23 433.10     433.30   8. Morbid obesity with BMI of 45.0-49.9, adult ([facility])  E66.01 278.01    Z68.42 V85.42   9. 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 ([facility])  L97.524 707.15     730.17   10. Coronary artery disease involving native coronary artery of native heart without angina pectoris  I25.10 414.01   11. MIKE and COPD overlap syndrome ([facility])  G47.33 327.23    J44.9 496   12. Impaired physical mobility  Z74.09 781.99   13. Impaired mobility and ADLs  Z74.09 V49.89    Z78.9      Patient Active Problem List   Diagnosis   • Chronic midline low back pain without sciatica   • Vitamin D deficiency   • Tobacco dependence syndrome   • Shoulder joint pain   • Morbid obesity with BMI of 50.0-59.9, adult ([facility])   • Mixed hyperlipidemia   • Kidney stone   • History of colon polyps   • GERD (gastroesophageal reflux disease)   • Excoriated eczema   • Hypertension   • COPD (chronic obstructive pulmonary disease) ([facility])   • Chronic folliculitis   • Coronary artery disease involving native coronary artery of native heart without angina pectoris   • 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   • Hypothyroidism   • Carotid artery disease ([facility])   • [place] abuse   • PAD (peripheral artery disease) ([facility])   • Venous insufficiency   • LAFB (left anterior fascicular block)   • Pulmonary hypertension ([facility])   • Left hip pain   • Neck pain   • MIKE and COPD overlap  syndrome ([facility])   • Pneumonia due to COVID-19 virus   • Hyperkalemia   • Cutaneous candidiasis   • Community acquired pneumonia of right middle lobe of lung   • MRSA infection   • Esophageal dysphagia   • Monilial esophagitis ([facility])   • NSTEMI, initial episode of care ([facility])   • Cellulitis of foot associated with diabetes mellitus ([facility])   • Urinary tract infection due to Proteus   • History of insertion of tunneled central venous catheter (CVC) with port   • Anemia due to chronic kidney disease, on chronic dialysis ([facility])   • Pneumonitis   • Personal history of noncompliance with medical treatment, presenting hazards to health   • Type 2 diabetes mellitus with circulatory disorder ([facility])   • Physical deconditioning   • ESRD on hemodialysis ([facility])   • DVT prophylaxis   • Anemia   • Ventilator associated pneumonia ([facility])   • Positive fecal occult blood test   • Yeast UTI   • Gangrene of both feet ([facility])   • Left foot pain   • 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 ([facility])   • On home oxygen therapy     Past Medical History:   Diagnosis Date   • Acute blood loss anemia 04/16/2017    Likely due to gastric oozing at this time. - Dr. Duarte (GI) was consulted and has now signed off, will follow up outpatient - pill colonoscopy showed AVMs - continue to monitor   • Acute cystitis with hematuria 03/31/2021 1/13: IV Rocephin 1 gm q 24 1/14 : transitioned  to omnicef 300mg. Urine cultures resulted and did not show growth. Omnicef discontinued as patient is asymptomatic   • Altered mental status 01/09/2022    - AMS on presentation - initial ABG pH 7.3, CO2 34 - Procal 0.29 - UA negative for acute cysitits -CTA head wnl  - Empiric Zosyn and Vancomycin -Lactate 2.5 on admission  - blood cultures no growth at 24 hours     • Anxiety    • CAD (coronary artery disease) 04/24/2021    S/P 3 stents 5/1/2021 for [facility] Continue ASA 81mg & Clopidogrel 75mg Continue Atorvastatin 40mg   • Carotid artery stenosis    • CHF (congestive heart  failure) ([facility])    • Chronic obstructive lung disease ([facility])    • CKD (chronic kidney disease) stage 4, GFR 15-29 ml/min ([facility])    • CKD (chronic kidney disease), symptom management only, stage 5 ([facility]) 10/05/2020    Results from last 7 days Lab Units 12/15/21 0548 12/14/21 1323 12/14/21 0916 CREATININE mg/dL 3.92* 3.21* 3.32*  Baseline creatinine 2-3 GFR 13-25 GFR 15 Dialysis MWF, sees Dr. Lauren Nephrology consult,, appreciate recommendations Continue Bumex 1mg bid daily Holding Bumex 2mg 4 times a week   • Colonic polyp    • Coronary arteriosclerosis    • Diabetes mellitus ([facility])    • Diabetic neuropathy ([facility])    • Ear pain, right 10/18/2021    - canal trauma due to patient scratching and DMT2 - added cortisporin ear drops   • Elevated troponin 10/12/2021    -most likely from CKD -Trending down -Neg chest pain   • Generalized abdominal pain 07/01/2022    Could be due to initiation of tube feeds vs dyspepsia vs abdominal cramps related to no PO intake due to intubation vs constipation Continue current laxative regiment  If no bowel movement by this afternoon will consider enema   • GERD (gastroesophageal reflux disease)    • GI bleed 05/13/2021    - GI will follow up outpatient - Protonix 40mg daily - Avoid medical DVT prophy and use mechanical at this time instead. - Continue to monitor - pill colonoscopy results showed AVMs   • History of transfusion    • Hypercholesterolemia    • Hyperosmolar hyperglycemic state (HHS) ([facility]) 06/25/2022    Serum glucose 605 on admission  Anion gap 16 PH 7.37 Bicarb 27.9 Continue fluids  Insulin drip with HHS protocol  Anion gap closed around 10 AM, received one dose of Levamir subq, will stop insulin drip after 2 hrs     • Hypertension    • Hypokalemia 05/27/2022    Will replace as needed. Will be cautious in the setting of ESRD to avoid need for emergency dialysis   Monitor Qtc intervals on EKG     • Hypomagnesemia 06/27/2021    Monitor and replace   • Morbid obesity ([facility])    •  Nephrolithiasis    • On mechanically assisted ventilation ([facility]) 06/26/2022    Vent management and sedation orders placed.  - [facility] intensivist group consulted for vent management appreciate recommendations  - plan to extubate today     • Peripheral vascular disease ([facility])    • Pleural effusion on right 06/26/2022    CXR on 6/30/22 read as a small upper left pulmonary edema vs early pneumonia.  Last Echo 1/2022 EF 61-65 % Continue to monitor  Procal slightly improved, CRP improved On Linezolid and meropenum      • PVD (peripheral vascular disease) ([facility])    • SIRS (systemic inflammatory response syndrome) ([facility]) 01/09/2022    Admission  - WBC 17.78   -   - RR 16 - 1/10: VSS/wnl - CXR - Mild pulm edema - Blood cultures no growth at 24 hours  - Procalcitonin 0.29 - UA : glucose 1000, negative Leucocytes/nitrate - Empiric Zosyn and Vancomcyin    • Sleep apnea    • Substance abuse ([facility])    • Vitamin D deficiency      Past Surgical History:   Procedure Laterality Date   • CARDIAC CATHETERIZATION N/A 07/14/2020   • CARDIAC CATHETERIZATION N/A 04/23/2021    Procedure: Left Heart Cath;  Surgeon: Melba Romo MD;  Location: [facility] CATH INVASIVE LOCATION;  Service: Cardiology;  Laterality: N/A;   • CARDIAC CATHETERIZATION N/A 04/30/2021    Procedure: Percutaneous Coronary Intervention;  Surgeon: Russell Voss MD;  Location: [facility] CATH INVASIVE LOCATION;  Service: Cardiovascular;  Laterality: N/A;   • CARDIAC CATHETERIZATION N/A 04/30/2021    Procedure: Stent NIKKI coronary;  Surgeon: Russell Voss MD;  Location: [facility] CATH INVASIVE LOCATION;  Service: Cardiovascular;  Laterality: N/A;   • CARDIAC CATHETERIZATION Left 11/13/2021    Procedure: Left Heart Cath;  Surgeon: Niall Rios MD;  Location: [facility] CATH INVASIVE LOCATION;  Service: Cardiology;  Laterality: Left;   • CAROTID STENT Left    • COLONOSCOPY     • COLONOSCOPY N/A 05/14/2021    Procedure: COLONOSCOPY;  Surgeon: Felicia  MD Mingo;  Location: [facility] ENDOSCOPY;  Service: Gastroenterology;  Laterality: N/A;   • CORONARY ARTERY BYPASS GRAFT N/A 2013    CABG X 3   • CYSTOSCOPY BLADDER STONE LITHOTRIPSY Bilateral    • ENDOSCOPY N/A 04/12/2021    Procedure: ESOPHAGOGASTRODUODENOSCOPY;  Surgeon: Mingo Duarte MD;  Location: [facility] ENDOSCOPY;  Service: Gastroenterology;  Laterality: N/A;   • ENDOSCOPY N/A 05/14/2021    Procedure: ESOPHAGOGASTRODUODENOSCOPY;  Surgeon: Mingo Duarte MD;  Location: [facility] ENDOSCOPY;  Service: Gastroenterology;  Laterality: N/A;   • ENDOSCOPY N/A 01/28/2022    Procedure: ESOPHAGOGASTRODUODENOSCOPY;  Surgeon: Mingo Duarte MD;  Location: [facility] ENDOSCOPY;  Service: Gastroenterology;  Laterality: N/A;   • HYSTERECTOMY     • INTERVENTIONAL RADIOLOGY PROCEDURE N/A 10/21/2021    Procedure: tunneled central venous catheter placement;  Surgeon: Donnie Robles MD;  Location: [facility] ANGIO INVASIVE LOCATION;  Service: Interventional Radiology;  Laterality: N/A;   • INTERVENTIONAL RADIOLOGY PROCEDURE N/A 01/27/2022    Procedure: tunneled central venous catheter placement;  Surgeon: Donnie Robles MD;  Location: [facility] ANGIO INVASIVE LOCATION;  Service: Interventional Radiology;  Laterality: N/A;   • LAPAROSCOPIC TUBAL LIGATION     • TUNNELED VENOUS CATHETER PLACEMENT       PT Assessment (last 12 hours)     PT Evaluation and Treatment     Row Name 12/22/22 1003          Physical Therapy Time and Intention    Subjective Information complains of;pain  -JW     Document Type therapy note (daily note)  -JW     Mode of Treatment individual therapy;physical therapy  -JW     Patient Effort good  -JW     Comment pt sitting EOB w/ sister  -JW     Row Name 12/22/22 1003          General Information    Patient Profile Reviewed yes  -JW     Existing Precautions/Restrictions fall  -JW     Row Name 12/22/22 1003          Pain    Pre/Posttreatment Pain Comment 10-butt, 8-L leg, 7-nose, 6-ears  -JW      Pain Intervention(s) Medication (See MAR)  -[facility] Name 12/22/22 1003          Cognition    Orientation Status (Cognition) oriented to;person;place;situation  -[facility] Name 12/22/22 1003          Transfers    Transfers sit-stand transfer;stand-sit transfer  -[facility] Name 12/22/22 1003          Sit-Stand Transfer    Sit-Stand Andrews (Transfers) moderate assist (50% patient effort);2 person assist  -     Assistive Device (Sit-Stand Transfers) walker, front-wheeled  -     Comment, (Sit-Stand Transfer) attempted 5 times, only achieved full stand twice  -[facility] Name 12/22/22 1003          Stand-Sit Transfer    Stand-Sit Andrews (Transfers) moderate assist (50% patient effort)  -     Assistive Device (Stand-Sit Transfers) walker, front-wheeled  -[facility] Name 12/22/22 1003          Motor Skills    Therapeutic Exercise ankle;knee;hip  -JW     Row Name 12/22/22 1003          Hip (Therapeutic Exercise)    Hip (Therapeutic Exercise) AROM (active range of motion)  -     Hip AROM (Therapeutic Exercise) bilateral;flexion;sitting;10 repetitions  x 2  -[facility] Name 12/22/22 1003          Knee (Therapeutic Exercise)    Knee (Therapeutic Exercise) AROM (active range of motion)  -     Knee AROM (Therapeutic Exercise) bilateral;LAQ (long arc quad);sitting;10 repetitions;2 sets  unable to achieve full extension L LE 2' pain  -[facility] 12/22/22 1003          Ankle (Therapeutic Exercise)    Ankle (Therapeutic Exercise) AROM (active range of motion)  -     Ankle AROM (Therapeutic Exercise) right;dorsiflexion;plantarflexion;sitting;20 repititions  -     Row Name             Wound 08/09/22 1021 Right anterior great toe Incision    Wound - Properties Group Placement Date: 08/09/22  -SC Placement Time: 1021  -SC Side: Right  -SC Orientation: anterior  -SC Location: great toe  -SC Primary Wound Type: Incision  -MG    Retired Wound - Properties Group Placement Date: 08/09/22  -SC Placement  Time: 1021  -SC Side: Right  -SC Orientation: anterior  -SC Location: great toe  -SC Primary Wound Type: Incision  -MG    Retired Wound - Properties Group Date first assessed: 08/09/22  -SC Time first assessed: 1021  -SC Side: Right  -SC Location: great toe  -SC Primary Wound Type: Incision  -MG    Row Name             Wound 12/15/22 1609 Left posterior    Wound - Properties Group Placement Date: 12/15/22  -LR Placement Time: 1609  -LR Side: Left  -LR Orientation: posterior  -LR    Retired Wound - Properties Group Placement Date: 12/15/22  -LR Placement Time: 1609  -LR Side: Left  -LR Orientation: posterior  -LR    Retired Wound - Properties Group Date first assessed: 12/15/22  -LR Time first assessed: 1609  -LR Side: Left  -LR    Row Name             Wound 12/15/22 1652 coccyx    Wound - Properties Group Placement Date: 12/15/22  -LR Placement Time: 1652  -LR Location: coccyx  -LR    Retired Wound - Properties Group Placement Date: 12/15/22  -LR Placement Time: 1652  -LR Location: coccyx  -LR    Retired Wound - Properties Group Date first assessed: 12/15/22  -LR Time first assessed: 1652  -LR Location: coccyx  -LR    Row Name             Wound 12/15/22 1728 Left anterior    Wound - Properties Group Placement Date: 12/15/22  -LR Placement Time: 1728  -LR Side: Left  -LR Orientation: anterior  -LR    Retired Wound - Properties Group Placement Date: 12/15/22  -LR Placement Time: 1728  -LR Side: Left  -LR Orientation: anterior  -LR    Retired Wound - Properties Group Date first assessed: 12/15/22  -LR Time first assessed: 1728  -LR Side: Left  -LR    Row Name             Wound 09/06/22 2356 Right anterior second toe Traumatic    Wound - Properties Group Placement Date: 09/06/22  -RL Placement Time: 2356  -RL Side: Right  -RL Orientation: anterior  -RL Location: second toe  -RL Primary Wound Type: Traumatic  -MG    Retired Wound - Properties Group Placement Date: 09/06/22  -RL Placement Time: 2356  -RL Side: Right  -RL  Orientation: anterior  -RL Location: second toe  -RL Primary Wound Type: Traumatic  -MG    Retired Wound - Properties Group Date first assessed: 09/06/22  -RL Time first assessed: 2356  -RL Side: Right  -RL Location: second toe  -RL Primary Wound Type: Traumatic  -MG    Row Name             Wound 12/16/22 1647 Left lower leg Incision    Wound - Properties Group Placement Date: 12/16/22  -MJ Placement Time: 1647  -MJ Present on Hospital Admission: N  -MJ Side: Left  -MJ Orientation: lower  -MJ Location: leg  -MJ Primary Wound Type: Incision  -MJ    Retired Wound - Properties Group Placement Date: 12/16/22  -MJ Placement Time: 1647  -MJ Present on Hospital Admission: N  -MJ Side: Left  -MJ Orientation: lower  -MJ Location: leg  -MJ Primary Wound Type: Incision  -MJ    Retired Wound - Properties Group Date first assessed: 12/16/22  -MJ Time first assessed: 1647  -MJ Present on Hospital Admission: N  -MJ Side: Left  -MJ Location: leg  -MJ Primary Wound Type: Incision  -MJ    Row Name 12/22/22 1003          Plan of Care Review    Plan of Care Reviewed With patient;sibling  -JW     Outcome Evaluation pt sitting EOB before and after tx, performed B LE seated ther ex, lacking full knee extension in L BKA, pt sit<>stand w/ Mod x 2 w/ RW, attempted 5 times, only able to achieve full stand 2 times  -     Row Name 12/22/22 1003          Vital Signs    Pretreatment Heart Rate (beats/min) 78  -JW     Pre SpO2 (%) 94  -JW     O2 Delivery Pre Treatment supplemental O2  -     Pre Patient Position Sitting  -     Intra Patient Position Standing  -     Post Patient Position Sitting  -     Row Name 12/22/22 1003          Positioning and Restraints    Pre-Treatment Position in bed  -     Post Treatment Position bed  -JW     In Bed sitting EOB;call light within reach;encouraged to call for assist;with family/caregiver  -     Row Name 12/22/22 1003          Therapy Assessment/Plan (PT)    Rehab Potential (PT) good, to achieve  stated therapy goals  -     Criteria for Skilled Interventions Met (PT) yes;meets criteria;skilled treatment is necessary  -     Therapy Frequency (PT) daily  x2 if tolerated  -     Row Name 12/22/22 1003          Bed Mobility Goal 1 (PT)    Activity/Assistive Device (Bed Mobility Goal 1, PT) sit to supine;supine to sit  -     [place] Level/Cues Needed (Bed Mobility Goal 1, PT) minimum assist (75% or more patient effort);moderate assist (50-74% patient effort)  -     Time Frame (Bed Mobility Goal 1, PT) by discharge  -     Strategies/Barriers (Bed Mobility Goal 1, PT) co-morbidities  -     Progress/Outcomes (Bed Mobility Goal 1, PT) goal not met  -     Row Name 12/22/22 1003          Transfer Goal 1 (PT)    Activity/Assistive Device (Transfer Goal 1, PT) sit-to-stand/stand-to-sit;bed-to-chair/chair-to-bed;wheelchair transfer  -     [place] Level/Cues Needed (Transfer Goal 1, PT) minimum assist (75% or more patient effort);moderate assist (50-74% patient effort)  -     Time Frame (Transfer Goal 1, PT) 2 weeks  -     Progress/Outcome (Transfer Goal 1, PT) goal not met  -     Row Name 12/22/22 1003          ROM Goal 1 (PT)    ROM Goal 1 (PT) Pt will tolerate bed in chair position 30 minutes progressing to OOB in chair with VSS  -     Time Frame (ROM Goal 1, PT) 2 weeks  -     Progress/Outcome (ROM Goal 1, PT) goal not met  -     Row Name 12/22/22 1003          Problem Specific Goal 1 (PT)    Problem Specific Goal 1 (PT) Pt will propel w/c 50ft or more with SBA with VSS  -     Time Frame (Problem Specific Goal 1, PT) 2 weeks;3 weeks  -     Progress/Outcome (Problem Specific Goal 1, PT) goal not met  -           User Key  (r) = Recorded By, (t) = Taken By, (c) = Cosigned By    Initials Name Provider Type     Dalila Pacheco, PTA Physical Therapist Assistant    Sharon Franklin RN Registered Nurse    Tracy Shaw RN Registered Nurse    MELLO Weiss,  Lisha, RN Registered Nurse    Rabia Carty, RN Registered Nurse    Milly Landin, RN Registered Nurse                Physical Therapy Education     Title: PT OT SLP Therapies (In Progress)     Topic: Physical Therapy (In Progress)     Point: Mobility training (In Progress)     Learning Progress Summary           Patient Acceptance, E, NR by  at 12/21/2022 1321    Acceptance, E, NR by  at 12/20/2022 1322                   Point: Home exercise program (Not Started)     Learner Progress:  Not documented in this visit.          Point: Body mechanics (Not Started)     Learner Progress:  Not documented in this visit.          Point: Precautions (Not Started)     Learner Progress:  Not documented in this visit.                      User Key     Initials Effective Dates Name Provider Type Discipline     06/16/21 -  Oliver Bennett, PTA Physical Therapist Assistant PT              PT Recommendation and Plan  Anticipated Discharge Disposition (PT): LTCH (long-term care [facility]), inpatient rehabilitation facility, extended care facility  Therapy Frequency (PT): daily (x2 if tolerated)  Plan of Care Reviewed With: patient, sibling  Outcome Evaluation: pt sitting EOB before and after tx, performed B LE seated ther ex, lacking full knee extension in L BKA, pt sit<>stand w/ Mod x 2 w/ RW, attempted 5 times, only able to achieve full stand 2 times   Outcome Measures     Row Name 12/21/22 1206 12/20/22 1300          How much help from another person do you currently need...    Turning from your back to your side while in flat bed without using bedrails? 3  -HENRIQUE 2  -HENRIQUE     Moving from lying on back to sitting on the side of a flat bed without bedrails? 2  -HENRIQUE 2  -HENRIQUE     Moving to and from a bed to a chair (including a wheelchair)? 2  -HENRIQUE 1  -HENRIQUE     Standing up from a chair using your arms (e.g., wheelchair, bedside chair)? 2  -HENRIQUE 1  -HENRIQUE     Climbing 3-5 steps with a railing? 1  -HENRIQUE 1  -HENRIQUE     To walk in hospital  room? 1  - 1  -HENRIQUE     AM-PAC 6 Clicks Score (PT) 11  -HENRIQUE 8  -        Functional Assessment    Outcome Measure Options AM-PAC 6 Clicks Basic Mobility (PT)  -HENRIQUE AM-PAC 6 Clicks Basic Mobility (PT)  -           User Key  (r) = Recorded By, (t) = Taken By, (c) = Cosigned By    Initials Name Provider Type    HENRIQUE Oliver Bennett, PTA Physical Therapist Assistant                 Time Calculation:    PT Charges     Row Name 12/22/22 1003             Time Calculation    Start Time 1003  -      Stop Time 1036  -      Time Calculation (min) 33 min  -      PT Received On 12/22/22  -         Time Calculation- PT    Total Timed Code Minutes- PT 33 minute(s)  -            User Key  (r) = Recorded By, (t) = Taken By, (c) = Cosigned By    Initials Name Provider Type    Dalila Jacobo PTA Physical Therapist Assistant              Therapy Charges for Today     Code Description Service Date Service Provider Modifiers Qty    84383240375 HC PT THERAPEUTIC ACT EA 15 MIN 12/22/2022 Dalila Pacheco PTA GP 1    38477842778 HC PT THER PROC EA 15 MIN 12/22/2022 Dalila Pacheco PTA GP 1          PT G-Codes  Outcome Measure Options: AM-PAC 6 Clicks Basic Mobility (PT)  AM-PAC 6 Clicks Score (PT): 11  AM-PAC 6 Clicks Score (OT): 13    Dalila Pacheco PTA  12/22/2022

## 2022-12-22 NOTE — PROGRESS NOTES
Adult Nutrition  Assessment/PES    Patient Name:  Yamileth Slater  YOB: 1960  MRN: [id number]  Admit Date:  12/15/2022    Assessment Date:  12/22/2022    Comments:      Pt sitting on the side of bed.  Less confused with improved menation and reports that she is eating better.  Poor acceptance of supplements.  Denies any eating difficulties.  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  Dialysis continues with hx of ESRD.  Gluc elevated--covered by SSI--she may need some regular scheduled insulin now that she is eating better.  RD Provided menu alternatives.  Discussed the need for increased protein for healing.  Will monitor POC and po intake      Reason for Assessment     Row Name 12/22/22 1458          Reason for Assessment    Reason For Assessment follow-up protocol                Nutrition/Diet History     Row Name 12/22/22 1459          Nutrition/Diet History    Typical Intake (Food/Fluid/EN/PN) Pt  sitting up on the side of the bed. She said she was eating better and feeling better.  Does not like the NovaRenal supplements                Labs/Tests/Procedures/Meds     Row Name 12/22/22 8147          Labs/Procedures/Meds    Lab Results Reviewed reviewed, pertinent     Lab Results Comments Na 128; Bun 54; Creat 4.88; Crp 6.95; Gluc 270/358/342/354/318        Diagnostic Tests/Procedures    Diagnostic Test/Procedure Reviewed reviewed, pertinent     Diagnostic Test/Procedures Comments Wound care        Medications    Pertinent Medications Reviewed reviewed, pertinent     Pertinent Medications Comments coreg; Lasix; Heparin; SSI                    Nutrition Prescription Ordered     Row Name 12/22/22 9212          Nutrition Prescription PO    Current PO Diet Regular     Fluid Consistency Thin     Supplement Novasource Renal (Nepro)     Common Modifiers Consistent Carbohydrate;Low Sodium;Renal                Evaluation of Received Nutrient/Fluid Intake     Row Name 12/22/22  1459          PO Evaluation    Number of Days PO Intake Evaluated Insufficient Data     Number of Meals 3     % PO Intake 50/50/75                   Problem/Interventions:   Problem 1     Row Name 12/22/22 1500          Nutrition Diagnoses Problem 1    Problem 1 Increased Nutrient Needs     Macronutrient Protein     Etiology (related to) Medical Diagnosis     Ortho BKA     Renal Hemodialysis;ESRD     Skin Surgical wound     Other Comment Hypermetabolic state                Problem 2     Row Name 12/22/22 1500          Nutrition Diagnoses Problem 2    Problem 2 Altered Nutrition Related to Labs     Etiology (related to) Medical Diagnosis     Metabolic/ICU Hyponatremia;Hyperglycemia     Renal ESRD;Hemodialysis     Signs/Symptoms (evidenced by) Biochemical     Specific Labs Noted BUN;Creatinine;Glucose;Na+                Problem 3     Row Name 12/22/22 1502          Nutrition Diagnoses Problem 3    Problem 3 Inadequate Intake/Infusion     Inadequate Intake Type Oral     Macronutrient Kcal;Protein     Micronutrient Vitamin;Mineral     Etiology (related to) Factors Affecting Nutrition     Appetite Improved     Signs/Symptoms (evidenced by) PO Intake     Percent (%) intake recorded 60 %     Over number of meals 3                  Intervention Goal     Row Name 12/22/22 1502          Intervention Goal    General Meet nutritional needs for age/condition;Reduce/improve symptoms     PO Tolerate PO;Increase intake;Meet estimated needs     Weight No significant weight loss                Nutrition Intervention     Row Name 12/22/22 1506          Nutrition Intervention    RD/Tech Action Follow Tx progress;Care plan reviewd;Encourage intake;Supplement offered/refused;Adjusted supplement;Advise alternate selection;Advise available snack;Interview for preference;Menu provided                Nutrition Prescription     Row Name 12/22/22 1506          Nutrition Prescription PO    PO Prescription Discontinue supplement     Supplement  Nova Renal     Supplement Frequency 3 times a day     New PO Prescription Ordered? Yes                Education/Evaluation     Row Name 12/22/22 1503          Education    Education Education topics;Advised regarding habits/behavior     Education Topics Basic nutrition;Protein     Advised Regarding Habits/Behavior Increased nutrient density;Snacks;Use supplement;Food choices        Monitor/Evaluation    Monitor Per protocol;I&O;PO intake;Supplement intake;Pertinent labs;Weight;Skin status;Symptoms     Education Follow-up Reinforce PRN                 Electronically signed by:  Gissel Littlejohn RD  12/22/22 15:11 CST

## 2022-12-22 NOTE — PLAN OF CARE
Goal Outcome Evaluation:  Plan of Care Reviewed With: patient, sibling           Outcome Evaluation: pt sitting EOB before and after tx, performed B LE seated ther ex, lacking full knee extension in L BKA, pt sit<>stand w/ Mod x 2 w/ RW, attempted 5 times, only able to achieve full stand 2 times

## 2022-12-22 NOTE — PAYOR COMM NOTE
Karmen Colorado, RN [facility]  [phone]      Phone  [phone]       Fax  Cont. Stay REview      Yamileth Slater (62 y.o. Female)     Date of Birth   1960    Social Security Number       Address   [address] [place] [address]    Home Phone   [phone]    MRN   [id number]       Mosque   None    Marital Status   Legally                             Admission Date   12/15/22    Admission Type   Urgent    Admitting Provider   Nestor Richards MD    Attending Provider   Nestor Richards MD    Department, Room/Bed   [address], 412/1       Discharge Date       Discharge Disposition       Discharge Destination                               Attending Provider: Nestor Richards MD    Allergies: Adhesive Tape, Nsaids, Latex, Other, Wound Dressing Adhesive    Isolation: Contact   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), 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)   Code Status: CPR    Ht: 157.5 cm (62.01\")   Wt: 107 kg (235 lb 11.2 oz)    Admission Cmt: None   Principal Problem: Venous insufficiency [I87.2]                 Active Insurance as of 12/15/2022     Primary Coverage     Payor Plan Insurance Group Employer/Plan Group    ANTHEM MEDICARE REPLACEMENT ANTHEM MEDICARE ADVANTAGE [id number]     Payor Plan Address Payor Plan Phone Number Payor Plan Fax Number Effective Dates    [address] [phone]  1/1/2022 - None Entered    [facility] [id number]       Subscriber Name Subscriber Birth Date Member ID       YAMILETH SLATER 1960 [id number]           Secondary Coverage     Payor Plan Insurance Group Employer/Plan Group    [place]      Payor Plan Address Payor Plan Phone Number Payor Plan Fax Number Effective Dates    [address] [phone]  6/28/2019 - None Entered    [facility] [id number]       Subscriber Name Subscriber Birth Date Member ID       YAMILETH SLATER 1960 [id number]                  Emergency Contacts      (Rel.) Home Phone Work Phone Mobile Phone    RAMONA KHAN (Sister) -- -- [phone]    Yamileth Chavez (Daughter) [phone] -- [phone]    Suzanne Rivera (Daughter) [phone] -- [phone]            Vital Signs (last day)     Date/Time Temp Temp src Pulse Resp BP Patient Position SpO2    12/22/22 0919 98 (36.7) Oral 82 18 176/99 -- 100    12/22/22 0435 97.7 (36.5) Temporal 88 18 128/70 Lying 97    12/22/22 0210 -- -- 91 -- -- -- --    12/21/22 1910 97.8 (36.6) Oral 90 18 122/67 Sitting 98    12/21/22 1720 -- -- 91 -- -- -- --    12/21/22 1538 -- Oral 94 18 119/88 Lying 95    12/21/22 1158 98.3 (36.8) Oral 80 -- 138/72 Lying 96    12/21/22 1148 -- -- 80 20 115/68 Lying --    12/21/22 1125 -- -- 85 20 124/73 Lying --    12/21/22 1055 -- -- 81 18 129/70 Lying --    12/21/22 1025 -- -- 81 18 115/71 Lying --    12/21/22 0955 -- -- 80 16 125/54 Lying --    12/21/22 0925 -- -- 107 18 153/78 Lying --    12/21/22 0855 -- -- 82 20 153/78 Lying --    12/21/22 0825 -- -- 78 18 166/78 Lying --    12/21/22 0820 97.4 (36.3) Oral 78 18 170/78 Lying --    12/21/22 0418 97.6 (36.4) -- 73 18 154/82 -- 99    12/21/22 0124 -- -- 77 -- -- -- --          Oxygen Therapy (last day)     Date/Time SpO2 Device (Oxygen Therapy) Flow (L/min) Oxygen Concentration (%) ETCO2 (mmHg)    12/22/22 0919 100 nasal cannula 2 -- --    12/22/22 0435 97 -- -- -- --    12/21/22 1910 98 nasal cannula 2 -- --    12/21/22 1538 95 -- -- -- --    12/21/22 1158 96 -- -- -- --    12/21/22 0418 99 nasal cannula 2 -- --          Current Facility-Administered Medications   Medication Dose Route Frequency Provider Last Rate Last Admin   • acetaminophen (TYLENOL) tablet 650 mg  650 mg Oral Q4H PRN Huy White MD       • atorvastatin (LIPITOR) tablet 20 mg  20 mg Oral Nightly Huy White MD   20 mg at 12/21/22 1955   • calcium carbonate (TUMS) chewable tablet 500 mg (200 mg elemental)  2  tablet Oral BID PRN Huy White MD       • carvedilol (COREG) tablet 6.25 mg  6.25 mg Oral BID With Meals Ace Lauren MD   6.25 mg at 12/22/22 0918   • dextrose (D50W) (25 g/50 mL) IV injection 25 g  25 g Intravenous Q15 Min PRN Huy White MD       • dextrose (GLUTOSE) oral gel 15 g  15 g Oral Q15 Min PRN Huy White MD       • docusate sodium (COLACE) capsule 200 mg  200 mg Oral BID Jef Ramires MD   200 mg at 12/22/22 0918   • DULoxetine (CYMBALTA) DR capsule 20 mg  20 mg Oral Daily Huy White MD   20 mg at 12/22/22 0918   • furosemide (LASIX) tablet 80 mg  80 mg Oral Once per day on Sun Tue Thu Sat Ace Lauren MD   80 mg at 12/22/22 0918   • gabapentin (NEURONTIN) capsule 300 mg  300 mg Oral Once per day on Mon Wed Fri Huy White MD   300 mg at 12/21/22 0914    And   • gabapentin (NEURONTIN) capsule 300 mg  300 mg Oral 2 times per day on Sun Tue Thu Sat Huy White MD   300 mg at 12/22/22 0918   • glucagon (human recombinant) (GLUCAGEN DIAGNOSTIC) injection 1 mg  1 mg Intramuscular Q15 Min PRN Huy White MD       • heparin (porcine) 5000 UNIT/ML injection 5,000 Units  5,000 Units Subcutaneous Q8H Huy White MD   5,000 Units at 12/22/22 0539   • heparin (porcine) injection 4,000 Units  4,000 Units Intracatheter PRN Ace Lauren MD   4,000 Units at 12/21/22 1016   • HYDROmorphone (DILAUDID) injection 0.5 mg  0.5 mg Intravenous Q2H PRN Huy White MD   0.5 mg at 12/21/22 1532   • hydrOXYzine (ATARAX) tablet 25 mg  25 mg Oral Q8H PRN Huy White MD       • Insulin Aspart (novoLOG) injection 0-9 Units  0-9 Units Subcutaneous TID AC Huy White MD   7 Units at 12/22/22 0918   • ipratropium-albuterol (DUO-NEB) nebulizer solution 3 mL  3 mL Nebulization 4x Daily PRN Huy White MD       • LORazepam (ATIVAN) tablet 0.5 mg  0.5 mg Oral Q8H PRN Huy White  MD Pawel   0.5 mg at 12/22/22 0256   • losartan (COZAAR) tablet 25 mg  25 mg Oral Daily Ace Lauren MD   25 mg at 12/22/22 0918   • memantine (NAMENDA) tablet 5 mg  5 mg Oral BID Huy White MD   5 mg at 12/22/22 0918   • O2 (OXYGEN)  2 L/min Inhalation Continuous Huy White MD       • ondansetron (ZOFRAN) tablet 4 mg  4 mg Oral Q6H PRN Huy White MD        Or   • ondansetron (ZOFRAN) injection 4 mg  4 mg Intravenous Q6H PRN Huy White MD   4 mg at 12/21/22 1139   • oxyCODONE (ROXICODONE) immediate release tablet 10 mg  10 mg Oral Q4H PRN Huy White MD   10 mg at 12/22/22 0923   • pantoprazole (PROTONIX) EC tablet 40 mg  40 mg Oral Nightly Huy White MD   40 mg at 12/21/22 1955   • sevelamer (RENVELA) tablet 800 mg  800 mg Oral TID With Meals Huy White MD   800 mg at 12/22/22 0918   • sodium chloride 0.9 % flush 10 mL  10 mL Intravenous Q12H Huy White MD   10 mL at 12/21/22 1956   • sodium chloride 0.9 % flush 10 mL  10 mL Intravenous PRN Huy White MD   10 mL at 12/17/22 0855   • sodium chloride 0.9 % infusion 1,000 mL  1,000 mL Intravenous Continuous Huy White MD       • sodium chloride 0.9 % infusion 40 mL  40 mL Intravenous PRN Huy White MD       • temazepam (RESTORIL) capsule 15 mg  15 mg Oral Nightly PRN Huy White MD   15 mg at 12/21/22 1955        Physician Progress Notes (last 48 hours)      Jef Ramires MD at 12/22/22 1025              [facility] Medicine Services  INPATIENT PROGRESS NOTE    Length of Stay: 7  Date of Admission: 12/15/2022  Primary Care Physician: Kiersten Wilson APRN    Subjective   Chief Complaint: Left foot pain secondary to necrosis  HPI: Alert.  Still with some confusion.    As of today, 12/22/22  Review of Systems   Unable to perform ROS: Mental status change         All pertinent negatives and positives are as above. All other systems have been reviewed and are negative unless otherwise stated.    Objective    Temp:  [97.7 °F (36.5 °C)-98.3 °F (36.8 °C)] 98 °F (36.7 °C)  Heart Rate:  [80-94] 82  Resp:  [18-20] 18  BP: (115-176)/(67-99) 176/99    AM-PAC 6 Clicks Score (PT): 11 (12/22/22 1023)    As of today, 12/22/22  Physical Exam  Vitals reviewed.   Constitutional:       Appearance: She is well-developed.   HENT:      Head: Normocephalic and atraumatic.   Eyes:      Pupils: Pupils are equal, round, and reactive to light.   Cardiovascular:      Rate and Rhythm: Normal rate and regular rhythm.      Heart sounds: Normal heart sounds. No murmur heard.    No friction rub. No gallop.   Pulmonary:      Effort: Pulmonary effort is normal. No respiratory distress.      Breath sounds: Normal breath sounds. No wheezing or rales.   Chest:      Chest wall: No tenderness.   Abdominal:      General: Bowel sounds are normal. There is no distension.      Palpations: Abdomen is soft.      Tenderness: There is no abdominal tenderness. There is no guarding.   Musculoskeletal:      Cervical back: Normal range of motion and neck supple.      Comments: Left BKA.  Dressing clean dry and intact.   Skin:     General: Skin is warm and dry.   Neurological:      Mental Status: She is alert.      Comments: Interactive but confused   Psychiatric:         Speech: Speech normal.         Behavior: Behavior normal.      Comments: Much more interactive today.           Results Review:  I have reviewed the labs, radiology results, and diagnostic studies.    Laboratory Data:   Results from last 7 days   Lab Units 12/22/22  0652 12/21/22  0641 12/20/22  0522   SODIUM mmol/L 128* 132* 135*   POTASSIUM mmol/L 4.0 4.5 5.0   CHLORIDE mmol/L 86* 91* 92*   CO2 mmol/L 21.0* 21.0* 22.0   BUN mg/dL 54* 68* 47*   CREATININE mg/dL 4.88* 6.07* 4.43*   GLUCOSE mg/dL 354* 270* 244*   CALCIUM mg/dL 9.5 9.8 10.0   BILIRUBIN  mg/dL 0.3 0.3 0.4   ALK PHOS U/L 198* 203* 241*   ALT (SGPT) U/L <5 <5 <5   AST (SGOT) U/L 12 9 11   ANION GAP mmol/L 21.0* 20.0* 21.0*     Estimated Creatinine Clearance: 13.6 mL/min (A) (by C-G formula based on SCr of 4.88 mg/dL (H)).  Results from last 7 days   Lab Units 12/16/22  0259   MAGNESIUM mg/dL 1.9   PHOSPHORUS mg/dL 8.9*         Results from last 7 days   Lab Units 12/22/22  0652 12/21/22  0641 12/20/22  0522 12/19/22  0600 12/18/22  0825   WBC 10*3/mm3 13.31* 12.79* 13.52* 9.77 10.68   HEMOGLOBIN g/dL 12.4 12.5 13.1 12.3 12.7   HEMATOCRIT % 38.7 40.3 42.6 38.7 41.4   PLATELETS 10*3/mm3 241 251 281 259 253           Culture Data:   No results found for: BLOODCX  No results found for: URINECX  No results found for: RESPCX  No results found for: WOUNDCX  No results found for: STOOLCX  No components found for: BODYFLD    Radiology Data:   Imaging Results (Last 24 Hours)     ** No results found for the last 24 hours. **          I have reviewed the patient's current medications.     Assessment/Plan     Active Hospital Problems    Diagnosis    • **Venous insufficiency    • Left foot pain    • 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 ([facility])    • On home oxygen therapy    • ESRD on hemodialysis ([facility])    • MIKE and COPD overlap syndrome ([facility])    • Pulmonary hypertension ([facility])    • Coronary artery disease involving native coronary artery of native heart without angina pectoris    • Morbid obesity with BMI of 50.0-59.9, adult ([facility])    • Carotid artery disease ([facility])    • PAD (peripheral artery disease) ([facility])        Plan:  1.  Left foot necrosis: Status post BKA.  Continue antibiotics.  2.  End-stage renal disease: Continue hemodialysis.  Appreciate nephrology help.  3.  Diabetes mellitus: Continue current.  4.  Hypertension: Continue losartan.  5.  Altered mental status: Improving, but still some confusion.  Likely anesthesia reaction.  Patient's sister states that this is common for her.  6.  Chronic pain:  Continue current pain management for now.  7.  DVT prophylaxis: Heparin    The patient was evaluated during the global COVID-19 pandemic, and the diagnosis was suspected/considered upon their initial presentation.  Evaluation, treatment, and testing were consistent with current guidelines for patients who present with complaints or symptoms that may be related to COVID-19.    I confirmed that the patient's Advance Care Plan is present, code status is documented, or surrogate decision maker is listed in the patient's medical record.       Discharge Planning: I expect patient to be discharged to skilled facility versus LTAC in 2-3 days.        This document has been electronically signed by Jef Ramires MD on 2022 10:25 CST        Electronically signed by Jef Ramires MD at 22 1027     Huy White MD at 22 0738          ORTHOPEDIC PROGRESS NOTE:    Name:  Yamileth Slater  Date:    2022  Date of admission:  12/15/2022    Post op day:  6 Days Post-Op  Procedure:    Procedure(s) (LRB):  AMPUTATION BELOW KNEE, LEFT (Left)    Subjective:  More alert today  No new complaints  Pain in left BKA stump  No fever or chills      Vitals:     Vitals:    22 0435   BP: 128/70   Pulse: 88   Resp: 18   Temp: 97.7 °F (36.5 °C)   SpO2: 97%      Temp (24hrs), Av.8 °F (36.6 °C), Min:97.4 °F (36.3 °C), Max:98.3 °F (36.8 °C)      Exam:  Awake and alert  Dressing clean and dry  Good cap refill    Results from last 7 days   Lab Units 22  0652 22  0641 22  0522   CRP mg/dL 6.95* 7.81* 10.06*     Results from last 7 days   Lab Units 22  0641 22  0522 22  0600   WBC 10*3/mm3 12.79* 13.52* 9.77   HEMOGLOBIN g/dL 12.5 13.1 12.3   HEMATOCRIT % 40.3 42.6 38.7   PLATELETS 10*3/mm3 251 281 259         ASSESSMENT:  Active Hospital Problems    Diagnosis  POA   • **Venous insufficiency [I87.2]  Yes   • Left foot pain [M95.522]  Yes     Added  automatically from request for surgery [id number]     • 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 ([facility]) [L97.524]  Yes     Added automatically from request for surgery [id number]     • On home oxygen therapy [Z99.81]  Not Applicable     Added automatically from request for surgery [id number]     • ESRD on hemodialysis ([facility]) [N18.6, Z99.2]  Not Applicable     -Receives hemodialysis MWF at [facility] in [place]  Missed dialysis per family prior to arrival   Nephrology consulted appreciated recs          • MIKE and COPD overlap syndrome ([facility]) [G47.33, J44.9]  Yes     -Home Trilogy/CPAP continue   -Home 3L oxygen dependent. Maintain strict Sats between 88-92%       • Pulmonary hypertension ([facility]) [I27.20]  Yes   • Coronary artery disease involving native coronary artery of native heart without angina pectoris [I25.10]  Yes     - S/P CABG, Bilateral carotid artery stenosis w/ 2 stents on left side,  PAD (peripheral artery disease) with stent in right upper leg  - Cardiology on board       • Morbid obesity with BMI of 50.0-59.9, adult ([facility]) [E66.01, Z68.43]  Not Applicable     Body mass index is 56.52 kg/m².  - Consistent carb, cardiac, renal diet     • Carotid artery disease ([facility]) [I77.9]  Yes     -Status post stenting  -Continue home medications     • PAD (peripheral artery disease) ([facility]) [I73.9]  Yes     · Continue ASA 81mg, Clopidogrel 75mg, Atorvastatin 40mg           PLAN:    S/p left BKA  Slowly progress as tolerated  Up with PT/OT  Continue working on left leg extension  Conditioning  DVT prophylaxis  Disposition will need to consider mobility issues as well as dialysis.    12/22/22 at 07:39 CST by Huy White MD        Electronically signed by Huy White MD at 12/22/22 0716     Jef Ramires MD at 12/21/22 1523              [facility] Medicine Services  INPATIENT PROGRESS NOTE    Length of Stay: 6  Date of Admission: 12/15/2022  Primary  Care Physician: Kiersten Wilson APRN    Subjective   Chief Complaint: Left foot pain secondary to necrosis  HPI: Much more oriented today.  Mental status much better.    As of today, 12/21/22  Review of Systems   Unable to perform ROS: Mental status change        All pertinent negatives and positives are as above. All other systems have been reviewed and are negative unless otherwise stated.    Objective    Temp:  [97 °F (36.1 °C)-98.3 °F (36.8 °C)] 98.3 °F (36.8 °C)  Heart Rate:  [] 94  Resp:  [16-20] 18  BP: (115-170)/(54-88) 119/88    AM-PAC 6 Clicks Score (PT): 11 (12/21/22 1206)    As of today, 12/21/22  Physical Exam  Vitals reviewed.   Constitutional:       Appearance: She is well-developed.   HENT:      Head: Normocephalic and atraumatic.   Eyes:      Pupils: Pupils are equal, round, and reactive to light.   Cardiovascular:      Rate and Rhythm: Normal rate and regular rhythm.      Heart sounds: Normal heart sounds. No murmur heard.    No friction rub. No gallop.   Pulmonary:      Effort: Pulmonary effort is normal. No respiratory distress.      Breath sounds: Normal breath sounds. No wheezing or rales.   Chest:      Chest wall: No tenderness.   Abdominal:      General: Bowel sounds are normal. There is no distension.      Palpations: Abdomen is soft.      Tenderness: There is no abdominal tenderness. There is no guarding.   Musculoskeletal:      Cervical back: Normal range of motion and neck supple.      Comments: Left BKA.  Dressing clean dry and intact.   Skin:     General: Skin is warm and dry.   Neurological:      Mental Status: She is alert.      Comments: Much more interactive today.  More oriented.   Psychiatric:         Speech: Speech normal.         Behavior: Behavior normal.      Comments: Much more interactive today.           Results Review:  I have reviewed the labs, radiology results, and diagnostic studies.    Laboratory Data:   Results from last 7 days   Lab Units 12/21/22  3191  12/20/22  0522 12/19/22  0600   SODIUM mmol/L 132* 135* 133*   POTASSIUM mmol/L 4.5 5.0 4.3   CHLORIDE mmol/L 91* 92* 95*   CO2 mmol/L 21.0* 22.0 22.0   BUN mg/dL 68* 47* 57*   CREATININE mg/dL 6.07* 4.43* 4.98*   GLUCOSE mg/dL 270* 244* 201*   CALCIUM mg/dL 9.8 10.0 9.9   BILIRUBIN mg/dL 0.3 0.4 0.4   ALK PHOS U/L 203* 241* 252*   ALT (SGPT) U/L <5 <5 <5   AST (SGOT) U/L 9 11 16   ANION GAP mmol/L 20.0* 21.0* 16.0*     Estimated Creatinine Clearance: 10.9 mL/min (A) (by C-G formula based on SCr of 6.07 mg/dL (H)).  Results from last 7 days   Lab Units 12/16/22  0259   MAGNESIUM mg/dL 1.9   PHOSPHORUS mg/dL 8.9*         Results from last 7 days   Lab Units 12/21/22  0641 12/20/22  0522 12/19/22  0600 12/18/22  0825 12/18/22  0549   WBC 10*3/mm3 12.79* 13.52* 9.77 10.68 10.48   HEMOGLOBIN g/dL 12.5 13.1 12.3 12.7 12.8   HEMATOCRIT % 40.3 42.6 38.7 41.4 42.2   PLATELETS 10*3/mm3 251 281 259 253 261           Culture Data:   No results found for: BLOODCX  No results found for: URINECX  No results found for: RESPCX  No results found for: WOUNDCX  No results found for: STOOLCX  No components found for: BODYFLD    Radiology Data:   Imaging Results (Last 24 Hours)     ** No results found for the last 24 hours. **          I have reviewed the patient's current medications.     Assessment/Plan     Active Hospital Problems    Diagnosis    • **Venous insufficiency    • Left foot pain    • 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 ([facility])    • On home oxygen therapy    • ESRD on hemodialysis ([facility])    • MIKE and COPD overlap syndrome ([facility])    • Pulmonary hypertension ([facility])    • Coronary artery disease involving native coronary artery of native heart without angina pectoris    • Morbid obesity with BMI of 50.0-59.9, adult ([facility])    • Carotid artery disease ([facility])    • PAD (peripheral artery disease) ([facility])        Plan:  1.  Left foot necrosis: Status post BKA.  Continue antibiotics.  2.  End-stage renal disease: Continue  hemodialysis.  Appreciate nephrology help.  3.  Diabetes mellitus: Continue current.  4.  Hypertension: Continue losartan.  5.  Altered was: Improving.  Likely anesthesia reaction.  Patient's sister states that this is common for her.  6.  Chronic pain: Continue current pain management for now.  7.  DVT prophylaxis: Heparin    The patient was evaluated during the global COVID-19 pandemic, and the diagnosis was suspected/considered upon their initial presentation.  Evaluation, treatment, and testing were consistent with current guidelines for patients who present with complaints or symptoms that may be related to COVID-19.    I confirmed that the patient's Advance Care Plan is present, code status is documented, or surrogate decision maker is listed in the patient's medical record.       Discharge Planning: I expect patient to be discharged to skilled facility versus LTAC in 2-3 days.        This document has been electronically signed by Jef Ramires MD on 2022 15:41 CST        Electronically signed by Jef Ramires MD at 22 1543     [facility] Hospital[facility] MD at 22 0904          ORTHOPEDIC PROGRESS NOTE:    Name:  Yamileth Slater  Date:    2022  Date of admission:  12/15/2022    Post op day:  5 Days Post-Op  Procedure:    Procedure(s) (LRB):  AMPUTATION BELOW KNEE, LEFT (Left)    Subjective:  Only complaint is that she wants the restraints removed.  She is alert today  No fever or chills      Vitals:     Vitals:    22 0855   BP: 153/78   Pulse: 82   Resp: 20   Temp:    SpO2:       Temp (24hrs), Av.4 °F (36.3 °C), Min:97 °F (36.1 °C), Max:97.9 °F (36.6 °C)      Exam:  Awake and alert  Oriented to person, place, and situation  Dressing left BKA site intact.    Results from last 7 days   Lab Units 22  0641 22  0522 22  0600   CRP mg/dL 7.81* 10.06* 12.95*     Results from last 7 days   Lab Units 22  0641 22  0522 22  0600    WBC 10*3/mm3 12.79* 13.52* 9.77   HEMOGLOBIN g/dL 12.5 13.1 12.3   HEMATOCRIT % 40.3 42.6 38.7   PLATELETS 10*3/mm3 251 281 259         ASSESSMENT:  Active Hospital Problems    Diagnosis  POA   • **Venous insufficiency [I87.2]  Yes   • Left foot pain [M79.672]  Yes     Added automatically from request for surgery [id number]     • 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 ([facility]) [L97.524]  Yes     Added automatically from request for surgery [id number]     • On home oxygen therapy [Z99.81]  Not Applicable     Added automatically from request for surgery [id number]     • ESRD on hemodialysis ([facility]) [N18.6, Z99.2]  Not Applicable     -Receives hemodialysis MWF at [facility] in [place]  Missed dialysis per family prior to arrival   Nephrology consulted appreciated recs          • MIKE and COPD overlap syndrome ([facility]) [G47.33, J44.9]  Yes     -Home Trilogy/CPAP continue   -Home 3L oxygen dependent. Maintain strict Sats between 88-92%       • Pulmonary hypertension ([facility]) [I27.20]  Yes   • Coronary artery disease involving native coronary artery of native heart without angina pectoris [I25.10]  Yes     - S/P CABG, Bilateral carotid artery stenosis w/ 2 stents on left side,  PAD (peripheral artery disease) with stent in right upper leg  - Cardiology on board       • Morbid obesity with BMI of 50.0-59.9, adult ([facility]) [E66.01, Z68.43]  Not Applicable     Body mass index is 56.52 kg/m².  - Consistent carb, cardiac, renal diet     • Carotid artery disease ([facility]) [I77.9]  Yes     -Status post stenting  -Continue home medications     • PAD (peripheral artery disease) ([facility]) [I73.9]  Yes     · Continue ASA 81mg, Clopidogrel 75mg, Atorvastatin 40mg           PLAN:    Slowly improving  Mental status much better today  Continue dressing changes left LE  Continue medical management  DVT prophylaxis  Mobility as tolerated  Disposition will need to consider mobility issues as well as dialysis.    12/21/22 at 09:05 CST by Huy Armas  MD Christopher        Electronically signed by Huy White MD at 12/21/22 0946     Jef Ramires MD at 12/20/22 1238              [facility] Medicine Services  INPATIENT PROGRESS NOTE    Length of Stay: 5  Date of Admission: 12/15/2022  Primary Care Physician: Kiersten Wilson APRN    Subjective   Chief Complaint: Left foot pain secondary to necrosis  HPI: Patient remains at least somewhat confused.  Talks to me some today.  Still has a blank stare part of the time.    As of today, 12/20/22  Review of Systems   Unable to perform ROS: Mental status change        All pertinent negatives and positives are as above. All other systems have been reviewed and are negative unless otherwise stated.    Objective    Temp:  [97 °F (36.1 °C)-98.5 °F (36.9 °C)] 97.6 °F (36.4 °C)  Heart Rate:  [] 83  Resp:  [14-23] 23  BP: ()/(52-84) 108/58    AM-PAC 6 Clicks Score (PT): 8 (12/19/22 1530)    As of today, 12/20/22  Physical Exam  Vitals reviewed.   Constitutional:       Appearance: She is well-developed.   HENT:      Head: Normocephalic and atraumatic.   Eyes:      Pupils: Pupils are equal, round, and reactive to light.   Cardiovascular:      Rate and Rhythm: Normal rate and regular rhythm.      Heart sounds: Normal heart sounds. No murmur heard.    No friction rub. No gallop.   Pulmonary:      Effort: Pulmonary effort is normal. No respiratory distress.      Breath sounds: Normal breath sounds. No wheezing or rales.   Chest:      Chest wall: No tenderness.   Abdominal:      General: Bowel sounds are normal. There is no distension.      Palpations: Abdomen is soft.      Tenderness: There is no abdominal tenderness. There is no guarding.   Musculoskeletal:      Cervical back: Normal range of motion and neck supple.      Comments: Left BKA.  Dressing clean dry and intact.   Skin:     General: Skin is warm and dry.   Neurological:      Mental Status: She is  alert.      Comments: Answer some questions.  Still has a blank stare.   Psychiatric:      Comments: Answer some questions.  Still has a blank stare.           Results Review:  I have reviewed the labs, radiology results, and diagnostic studies.    Laboratory Data:   Results from last 7 days   Lab Units 12/20/22  0522 12/19/22  0600 12/18/22  0549   SODIUM mmol/L 135* 133* 132*   POTASSIUM mmol/L 5.0 4.3 4.6   CHLORIDE mmol/L 92* 95* 91*   CO2 mmol/L 22.0 22.0 22.0   BUN mg/dL 47* 57* 39*   CREATININE mg/dL 4.43* 4.98* 4.19*   GLUCOSE mg/dL 244* 201* 172*   CALCIUM mg/dL 10.0 9.9 10.1   BILIRUBIN mg/dL 0.4 0.4 0.5   ALK PHOS U/L 241* 252* 306*   ALT (SGPT) U/L <5 <5 6   AST (SGOT) U/L 11 16 18   ANION GAP mmol/L 21.0* 16.0* 19.0*     Estimated Creatinine Clearance: 14.8 mL/min (A) (by C-G formula based on SCr of 4.43 mg/dL (H)).  Results from last 7 days   Lab Units 12/16/22  0259   MAGNESIUM mg/dL 1.9   PHOSPHORUS mg/dL 8.9*         Results from last 7 days   Lab Units 12/20/22  0522 12/19/22  0600 12/18/22  0825 12/18/22  0549 12/17/22  0458   WBC 10*3/mm3 13.52* 9.77 10.68 10.48 9.36   HEMOGLOBIN g/dL 13.1 12.3 12.7 12.8 11.0*   HEMATOCRIT % 42.6 38.7 41.4 42.2 36.6   PLATELETS 10*3/mm3 281 259 253 261 219           Culture Data:   No results found for: BLOODCX  No results found for: URINECX  No results found for: RESPCX  No results found for: WOUNDCX  No results found for: STOOLCX  No components found for: BODYFLD    Radiology Data:   Imaging Results (Last 24 Hours)     ** No results found for the last 24 hours. **          I have reviewed the patient's current medications.     Assessment/Plan     Active Hospital Problems    Diagnosis    • **Venous insufficiency    • Left foot pain    • 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 (HCC)    • On home oxygen therapy    • ESRD on hemodialysis (HCC)    • MIKE and COPD overlap syndrome (HCC)    • Pulmonary hypertension (HCC)    • Coronary artery disease involving  native coronary artery of native heart without angina pectoris    • Morbid obesity with BMI of 50.0-59.9, adult ([facility])    • Carotid artery disease (HCC)    • PAD (peripheral artery disease) ([facility])        Plan:  1.  Left foot necrosis: Status post BKA.  Continue antibiotics.  2.  End-stage renal disease: Continue hemodialysis.  Appreciate nephrology help.  3.  Diabetes mellitus: Continue current.  4.  Hypertension: Continue losartan.  5.  Altered was: Improving.  Likely anesthesia reaction.  Patient's sister states that this is common for her.  6.  Chronic pain: Continue current pain management for now.  7.  DVT prophylaxis: Heparin    To floor today.    The patient was evaluated during the global COVID-19 pandemic, and the diagnosis was suspected/considered upon their initial presentation.  Evaluation, treatment, and testing were consistent with current guidelines for patients who present with complaints or symptoms that may be related to COVID-19.    I confirmed that the patient's Advance Care Plan is present, code status is documented, or surrogate decision maker is listed in the patient's medical record.       Discharge Planning: I expect patient to be discharged to skilled facility versus LTAC in 2-3 days.        This document has been electronically signed by Jef Ramires MD on December 20, 2022 12:38 CST        Electronically signed by Jef Ramires MD at 12/20/22 1243       Medical Student Notes (last 24 hours)  Notes from 12/21/22 1121 through 12/22/22 1121   No notes of this type exist for this encounter.         Consult Notes (last 24 hours)  Notes from 12/21/22 1121 through 12/22/22 1121   No notes of this type exist for this encounter.

## 2022-12-22 NOTE — PLAN OF CARE
Goal Outcome Evaluation:  Plan of Care Reviewed With: caregiver, patient        Progress: improving  Outcome Evaluation: Nutrition F/U:  pt is less confused and her oral intake has removed.  Poor acceptance of NovaRenal supplements.  RD will continue to monitor POC and po intake. Wound care continues.

## 2022-12-22 NOTE — PLAN OF CARE
Goal Outcome Evaluation:  Plan of Care Reviewed With: patient           Outcome Evaluation: No change this shift. VSS. Alert and oriented. Pain controlled with po meds.Refuses dressing change this am.

## 2022-12-22 NOTE — PROGRESS NOTES
ORTHOPEDIC PROGRESS NOTE:    Name:  Yamileth Slater  Date:    2022  Date of admission:  12/15/2022    Post op day:  6 Days Post-Op  Procedure:    Procedure(s) (LRB):  AMPUTATION BELOW KNEE, LEFT (Left)    Subjective:  More alert today  No new complaints  Pain in left BKA stump  No fever or chills      Vitals:     Vitals:    22 0435   BP: 128/70   Pulse: 88   Resp: 18   Temp: 97.7 °F (36.5 °C)   SpO2: 97%      Temp (24hrs), Av.8 °F (36.6 °C), Min:97.4 °F (36.3 °C), Max:98.3 °F (36.8 °C)      Exam:  Awake and alert  Dressing clean and dry  Good cap refill    Results from last 7 days   Lab Units 22  0652 22  0641 22  0522   CRP mg/dL 6.95* 7.81* 10.06*     Results from last 7 days   Lab Units 22  0641 22  0522 22  0600   WBC 10*3/mm3 12.79* 13.52* 9.77   HEMOGLOBIN g/dL 12.5 13.1 12.3   HEMATOCRIT % 40.3 42.6 38.7   PLATELETS 10*3/mm3 251 281 259         ASSESSMENT:  Active Hospital Problems    Diagnosis  POA   • **Venous insufficiency [I87.2]  Yes   • Left foot pain [M79.672]  Yes     Added automatically from request for surgery [id number]     • 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 (HCC) [L97.524]  Yes     Added automatically from request for surgery [id number]     • On home oxygen therapy [Z99.81]  Not Applicable     Added automatically from request for surgery [id number]     • ESRD on hemodialysis (HCC) [N18.6, Z99.2]  Not Applicable     -Receives hemodialysis MWF at [facility] in [place]  Missed dialysis per family prior to arrival   Nephrology consulted appreciated recs          • MIKE and COPD overlap syndrome (HCC) [G47.33, J44.9]  Yes     -Home Trilogy/CPAP continue   -Home 3L oxygen dependent. Maintain strict Sats between 88-92%       • Pulmonary hypertension (HCC) [I27.20]  Yes   • Coronary artery disease involving native coronary artery of native heart without angina pectoris [I25.10]  Yes     - S/P CABG, Bilateral carotid artery stenosis w/ 2  stents on left side,  PAD (peripheral artery disease) with stent in right upper leg  - Cardiology on board       • Morbid obesity with BMI of 50.0-59.9, adult ([facility]) [E66.01, Z68.43]  Not Applicable     Body mass index is 56.52 kg/m².  - Consistent carb, cardiac, renal diet     • Carotid artery disease ([facility]) [I77.9]  Yes     -Status post stenting  -Continue home medications     • PAD (peripheral artery disease) ([facility]) [I73.9]  Yes     · Continue ASA 81mg, Clopidogrel 75mg, Atorvastatin 40mg           PLAN:    S/p left BKA  Slowly progress as tolerated  Up with PT/OT  Continue working on left leg extension  Conditioning  DVT prophylaxis  Disposition will need to consider mobility issues as well as dialysis.    12/22/22 at 07:39 CST by Huy White MD

## 2022-12-22 NOTE — PLAN OF CARE
Goal Outcome Evaluation:              Outcome Evaluation: restraints d/c this AM. pt tolerated well. PRN pain meds given as ordered. VSS.

## 2022-12-22 NOTE — PROGRESS NOTES
[facility] Medicine Services  INPATIENT PROGRESS NOTE    Length of Stay: 7  Date of Admission: 12/15/2022  Primary Care Physician: Kiersten Wilson APRN    Subjective   Chief Complaint: Left foot pain secondary to necrosis  HPI: Alert.  Still with some confusion.    As of today, 12/22/22  Review of Systems   Unable to perform ROS: Mental status change        All pertinent negatives and positives are as above. All other systems have been reviewed and are negative unless otherwise stated.    Objective    Temp:  [97.7 °F (36.5 °C)-98.3 °F (36.8 °C)] 98 °F (36.7 °C)  Heart Rate:  [80-94] 82  Resp:  [18-20] 18  BP: (115-176)/(67-99) 176/99    AM-PAC 6 Clicks Score (PT): 11 (12/22/22 1023)    As of today, 12/22/22  Physical Exam  Vitals reviewed.   Constitutional:       Appearance: She is well-developed.   HENT:      Head: Normocephalic and atraumatic.   Eyes:      Pupils: Pupils are equal, round, and reactive to light.   Cardiovascular:      Rate and Rhythm: Normal rate and regular rhythm.      Heart sounds: Normal heart sounds. No murmur heard.    No friction rub. No gallop.   Pulmonary:      Effort: Pulmonary effort is normal. No respiratory distress.      Breath sounds: Normal breath sounds. No wheezing or rales.   Chest:      Chest wall: No tenderness.   Abdominal:      General: Bowel sounds are normal. There is no distension.      Palpations: Abdomen is soft.      Tenderness: There is no abdominal tenderness. There is no guarding.   Musculoskeletal:      Cervical back: Normal range of motion and neck supple.      Comments: Left BKA.  Dressing clean dry and intact.   Skin:     General: Skin is warm and dry.   Neurological:      Mental Status: She is alert.      Comments: Interactive but confused   Psychiatric:         Speech: Speech normal.         Behavior: Behavior normal.      Comments: Much more interactive today.           Results Review:  I have reviewed  the labs, radiology results, and diagnostic studies.    Laboratory Data:   Results from last 7 days   Lab Units 12/22/22  0652 12/21/22  0641 12/20/22  0522   SODIUM mmol/L 128* 132* 135*   POTASSIUM mmol/L 4.0 4.5 5.0   CHLORIDE mmol/L 86* 91* 92*   CO2 mmol/L 21.0* 21.0* 22.0   BUN mg/dL 54* 68* 47*   CREATININE mg/dL 4.88* 6.07* 4.43*   GLUCOSE mg/dL 354* 270* 244*   CALCIUM mg/dL 9.5 9.8 10.0   BILIRUBIN mg/dL 0.3 0.3 0.4   ALK PHOS U/L 198* 203* 241*   ALT (SGPT) U/L <5 <5 <5   AST (SGOT) U/L 12 9 11   ANION GAP mmol/L 21.0* 20.0* 21.0*     Estimated Creatinine Clearance: 13.6 mL/min (A) (by C-G formula based on SCr of 4.88 mg/dL (H)).  Results from last 7 days   Lab Units 12/16/22  0259   MAGNESIUM mg/dL 1.9   PHOSPHORUS mg/dL 8.9*         Results from last 7 days   Lab Units 12/22/22  0652 12/21/22  0641 12/20/22  0522 12/19/22  0600 12/18/22  0825   WBC 10*3/mm3 13.31* 12.79* 13.52* 9.77 10.68   HEMOGLOBIN g/dL 12.4 12.5 13.1 12.3 12.7   HEMATOCRIT % 38.7 40.3 42.6 38.7 41.4   PLATELETS 10*3/mm3 241 251 281 259 253           Culture Data:   No results found for: BLOODCX  No results found for: URINECX  No results found for: RESPCX  No results found for: WOUNDCX  No results found for: STOOLCX  No components found for: BODYFLD    Radiology Data:   Imaging Results (Last 24 Hours)     ** No results found for the last 24 hours. **          I have reviewed the patient's current medications.     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  Left foot necrosis: Status post BKA.  Continue antibiotics.  2.  End-stage renal disease: Continue hemodialysis.  Appreciate nephrology help.  3.  Diabetes mellitus: Continue current.  4.  Hypertension: Continue losartan.  5.  Altered mental status: Improving, but still some confusion.  Likely anesthesia reaction.  Patient's sister states that this is common for her.  6.  Chronic pain: Continue current pain management for now.  7.  DVT prophylaxis: Heparin    The patient was evaluated during the global COVID-19 pandemic, and the diagnosis was suspected/considered upon their initial presentation.  Evaluation, treatment, and testing were consistent with current guidelines for patients who present with complaints or symptoms that may be related to COVID-19.    I confirmed that the patient's Advance Care Plan is present, code status is documented, or surrogate decision maker is listed in the patient's medical record.       Discharge Planning: I expect patient to be discharged to skilled facility versus LTAC in 2-3 days.        This document has been electronically signed by Jef Ramires MD on December 22, 2022 10:25 CST

## 2022-12-22 NOTE — PLAN OF CARE
Goal Outcome Evaluation:  Plan of Care Reviewed With: patient        Progress: no change  Outcome Evaluation: Pt agrees to OT this am. Pt is Mod I for grooming and Independent for self feeding tasks. Following OT tx, pt sitting EOB with all needs within reach. Continue OT POC

## 2022-12-22 NOTE — THERAPY TREATMENT NOTE
Patient Name: Yamileth Slater  : 1960    MRN: [id number]                              Today's Date: 2022       Admit Date: 12/15/2022    Visit Dx:     ICD-10-CM ICD-9-CM   1. Venous insufficiency  I87.2 459.81   2. Pulmonary hypertension ([facility])  I27.20 416.8   3. Left foot pain  M79.672 729.5   4. PAD (peripheral artery disease) ([facility])  I73.9 443.9   5. On home oxygen therapy  Z99.81 V46.2   6. CKD (chronic kidney disease) requiring chronic dialysis ([facility])  N18.6 585.6    Z99.2 V45.11   7. Bilateral carotid artery stenosis  I65.23 433.10     433.30   8. Morbid obesity with BMI of 45.0-49.9, adult ([facility])  E66.01 278.01    Z68.42 V85.42   9. 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 ([facility])  L97.524 707.15     730.17   10. Coronary artery disease involving native coronary artery of native heart without angina pectoris  I25.10 414.01   11. MIKE and COPD overlap syndrome ([facility])  G47.33 327.23    J44.9 496   12. Impaired physical mobility  Z74.09 781.99   13. Impaired mobility and ADLs  Z74.09 V49.89    Z78.9      Patient Active Problem List   Diagnosis   • Chronic midline low back pain without sciatica   • Vitamin D deficiency   • Tobacco dependence syndrome   • Shoulder joint pain   • Morbid obesity with BMI of 50.0-59.9, adult ([facility])   • Mixed hyperlipidemia   • Kidney stone   • History of colon polyps   • GERD (gastroesophageal reflux disease)   • Excoriated eczema   • Hypertension   • COPD (chronic obstructive pulmonary disease) ([facility])   • Chronic folliculitis   • Coronary artery disease involving native coronary artery of native heart without angina pectoris   • 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   • Hypothyroidism   • Carotid artery disease ([facility])   • [place] abuse   • PAD (peripheral artery disease) ([facility])   • Venous insufficiency   • LAFB (left anterior fascicular block)   • Pulmonary hypertension ([facility])   • Left hip pain   • Neck pain   • MIKE and COPD overlap syndrome ([facility])    • Pneumonia due to COVID-19 virus   • Hyperkalemia   • Cutaneous candidiasis   • Community acquired pneumonia of right middle lobe of lung   • MRSA infection   • Esophageal dysphagia   • Monilial esophagitis ([facility])   • NSTEMI, initial episode of care ([facility])   • Cellulitis of foot associated with diabetes mellitus ([facility])   • Urinary tract infection due to Proteus   • History of insertion of tunneled central venous catheter (CVC) with port   • Anemia due to chronic kidney disease, on chronic dialysis ([facility])   • Pneumonitis   • Personal history of noncompliance with medical treatment, presenting hazards to health   • Type 2 diabetes mellitus with circulatory disorder ([facility])   • Physical deconditioning   • ESRD on hemodialysis ([facility])   • DVT prophylaxis   • Anemia   • Ventilator associated pneumonia ([facility])   • Positive fecal occult blood test   • Yeast UTI   • Gangrene of both feet ([facility])   • Left foot pain   • 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 ([facility])   • On home oxygen therapy     Past Medical History:   Diagnosis Date   • Acute blood loss anemia 04/16/2017    Likely due to gastric oozing at this time. - Dr. Duarte (GI) was consulted and has now signed off, will follow up outpatient - pill colonoscopy showed AVMs - continue to monitor   • Acute cystitis with hematuria 03/31/2021 1/13: IV Rocephin 1 gm q 24 1/14 : transitioned  to omnicef 300mg. Urine cultures resulted and did not show growth. Omnicef discontinued as patient is asymptomatic   • Altered mental status 01/09/2022    - AMS on presentation - initial ABG pH 7.3, CO2 34 - Procal 0.29 - UA negative for acute cysitits -CTA head wnl  - Empiric Zosyn and Vancomycin -Lactate 2.5 on admission  - blood cultures no growth at 24 hours     • Anxiety    • CAD (coronary artery disease) 04/24/2021    S/P 3 stents 5/1/2021 for [facility] Continue ASA 81mg & Clopidogrel 75mg Continue Atorvastatin 40mg   • Carotid artery stenosis    • CHF (congestive heart failure) ([facility])     • Chronic obstructive lung disease (HCC)    • CKD (chronic kidney disease) stage 4, GFR 15-29 ml/min ([facility])    • CKD (chronic kidney disease), symptom management only, stage 5 ([facility]) 10/05/2020    Results from last 7 days Lab Units 12/15/21 0548 12/14/21 1323 12/14/21 0916 CREATININE mg/dL 3.92* 3.21* 3.32*  Baseline creatinine 2-3 GFR 13-25 GFR 15 Dialysis MWF, sees Dr. Lauren Nephrology consult,, appreciate recommendations Continue Bumex 1mg bid daily Holding Bumex 2mg 4 times a week   • Colonic polyp    • Coronary arteriosclerosis    • Diabetes mellitus ([facility])    • Diabetic neuropathy ([facility])    • Ear pain, right 10/18/2021    - canal trauma due to patient scratching and DMT2 - added cortisporin ear drops   • Elevated troponin 10/12/2021    -most likely from CKD -Trending down -Neg chest pain   • Generalized abdominal pain 07/01/2022    Could be due to initiation of tube feeds vs dyspepsia vs abdominal cramps related to no PO intake due to intubation vs constipation Continue current laxative regiment  If no bowel movement by this afternoon will consider enema   • GERD (gastroesophageal reflux disease)    • GI bleed 05/13/2021    - GI will follow up outpatient - Protonix 40mg daily - Avoid medical DVT prophy and use mechanical at this time instead. - Continue to monitor - pill colonoscopy results showed AVMs   • History of transfusion    • Hypercholesterolemia    • Hyperosmolar hyperglycemic state (HHS) ([facility]) 06/25/2022    Serum glucose 605 on admission  Anion gap 16 PH 7.37 Bicarb 27.9 Continue fluids  Insulin drip with HHS protocol  Anion gap closed around 10 AM, received one dose of Levamir subq, will stop insulin drip after 2 hrs     • Hypertension    • Hypokalemia 05/27/2022    Will replace as needed. Will be cautious in the setting of ESRD to avoid need for emergency dialysis   Monitor Qtc intervals on EKG     • Hypomagnesemia 06/27/2021    Monitor and replace   • Morbid obesity ([facility])    • Nephrolithiasis    •  On mechanically assisted ventilation (HCC) 06/26/2022    Vent management and sedation orders placed.  - [facility] intensivist group consulted for vent management appreciate recommendations  - plan to extubate today     • Peripheral vascular disease ([facility])    • Pleural effusion on right 06/26/2022    CXR on 6/30/22 read as a small upper left pulmonary edema vs early pneumonia.  Last Echo 1/2022 EF 61-65 % Continue to monitor  Procal slightly improved, CRP improved On Linezolid and meropenum      • PVD (peripheral vascular disease) ([facility])    • SIRS (systemic inflammatory response syndrome) ([facility]) 01/09/2022    Admission  - WBC 17.78   -   - RR 16 - 1/10: VSS/wnl - CXR - Mild pulm edema - Blood cultures no growth at 24 hours  - Procalcitonin 0.29 - UA : glucose 1000, negative Leucocytes/nitrate - Empiric Zosyn and Vancomcyin    • Sleep apnea    • Substance abuse ([facility])    • Vitamin D deficiency      Past Surgical History:   Procedure Laterality Date   • CARDIAC CATHETERIZATION N/A 07/14/2020   • CARDIAC CATHETERIZATION N/A 04/23/2021    Procedure: Left Heart Cath;  Surgeon: Melba Romo MD;  Location: [facility] CATH INVASIVE LOCATION;  Service: Cardiology;  Laterality: N/A;   • CARDIAC CATHETERIZATION N/A 04/30/2021    Procedure: Percutaneous Coronary Intervention;  Surgeon: Russell Voss MD;  Location: [facility] CATH INVASIVE LOCATION;  Service: Cardiovascular;  Laterality: N/A;   • CARDIAC CATHETERIZATION N/A 04/30/2021    Procedure: Stent NIKKI coronary;  Surgeon: Russell Voss MD;  Location: [facility] CATH INVASIVE LOCATION;  Service: Cardiovascular;  Laterality: N/A;   • CARDIAC CATHETERIZATION Left 11/13/2021    Procedure: Left Heart Cath;  Surgeon: Niall Rios MD;  Location: [facility] CATH INVASIVE LOCATION;  Service: Cardiology;  Laterality: Left;   • CAROTID STENT Left    • COLONOSCOPY     • COLONOSCOPY N/A 05/14/2021    Procedure: COLONOSCOPY;  Surgeon: Mingo Duarte MD;   Location: [facility] ENDOSCOPY;  Service: Gastroenterology;  Laterality: N/A;   • CORONARY ARTERY BYPASS GRAFT N/A 2013    CABG X 3   • CYSTOSCOPY BLADDER STONE LITHOTRIPSY Bilateral    • ENDOSCOPY N/A 04/12/2021    Procedure: ESOPHAGOGASTRODUODENOSCOPY;  Surgeon: Mingo Duarte MD;  Location: [facility] ENDOSCOPY;  Service: Gastroenterology;  Laterality: N/A;   • ENDOSCOPY N/A 05/14/2021    Procedure: ESOPHAGOGASTRODUODENOSCOPY;  Surgeon: Mingo Duarte MD;  Location: [facility] ENDOSCOPY;  Service: Gastroenterology;  Laterality: N/A;   • ENDOSCOPY N/A 01/28/2022    Procedure: ESOPHAGOGASTRODUODENOSCOPY;  Surgeon: Mingo Duarte MD;  Location: [facility] ENDOSCOPY;  Service: Gastroenterology;  Laterality: N/A;   • HYSTERECTOMY     • INTERVENTIONAL RADIOLOGY PROCEDURE N/A 10/21/2021    Procedure: tunneled central venous catheter placement;  Surgeon: Donnie Robles MD;  Location: [facility] ANGIO INVASIVE LOCATION;  Service: Interventional Radiology;  Laterality: N/A;   • INTERVENTIONAL RADIOLOGY PROCEDURE N/A 01/27/2022    Procedure: tunneled central venous catheter placement;  Surgeon: Donnie Robles MD;  Location: [facility] ANGIO INVASIVE LOCATION;  Service: Interventional Radiology;  Laterality: N/A;   • LAPAROSCOPIC TUBAL LIGATION     • TUNNELED VENOUS CATHETER PLACEMENT        General Information     Row Name 12/22/22 0715          OT Time and Intention    Document Type therapy note (daily note)  -BB     Mode of Treatment individual therapy;occupational therapy  -BB     Row Name 12/22/22 0715          General Information    Patient Profile Reviewed yes  -BB     Existing Precautions/Restrictions fall  -BB     Row Name 12/22/22 0715          Cognition    Orientation Status (Cognition) oriented to;person;place;situation  -BB     Row Name 12/22/22 0715          Safety Issues, Functional Mobility    Impairments Affecting Function (Mobility) endurance/activity tolerance;pain;strength;range of motion (ROM)  -BB            User Key  (r) = Recorded By, (t) = Taken By, (c) = Cosigned By    Initials Name Provider Type    Nissa Guillermo COTA Occupational Therapist Assistant                 Mobility/ADL's     Row Name 12/22/22 0715          Bed Mobility    Bed Mobility supine-sit;sit-supine;scooting/bridging;rolling left;rolling right  -BB     Rolling Right [place] (Bed Mobility) minimum assist (75% patient effort)  -BB     Scooting/Bridging Canadian (Bed Mobility) standby assist;verbal cues  -BB     Supine-Sit Canadian (Bed Mobility) moderate assist (50% patient effort);2 person assist  -     Row Name 12/22/22 0715          Transfers    Transfers sit-stand transfer;stand-sit transfer  -     Row Name 12/22/22 0715          Bed-Chair Transfer    Bed-Chair [place] (Transfers) not tested  -[facility] Name 12/22/22 0715          Activities of Daily Living    BADL Assessment/Intervention feeding;grooming  -[facility] Name 12/22/22 0715          Self-Feeding Assessment/Training    [place] Level (Feeding) finger foods;liquids to mouth;prepare tray/open items;scoop food and bring to mouth;independent  -BB     Position (Self-Feeding) edge of bed sitting  -     Row Name 12/22/22 0715          Grooming Assessment/Training    [place] Level (Grooming) hair care, combing/brushing;wash face, hands;modified independence  -BB     Position (Grooming) edge of bed sitting  -           User Key  (r) = Recorded By, (t) = Taken By, (c) = Cosigned By    Initials Name Provider Type    Nissa Guillermo COTA Occupational Therapist Assistant               Obj/Interventions     Row Name 12/22/22 0715          Range of Motion Comprehensive    General Range of Motion other (see comments)  -     Row Name 12/22/22 0715          Strength Comprehensive (MMT)    General Manual Muscle Testing (MMT) Assessment other (see comments)  -           User Key  (r) = Recorded By, (t) = Taken By, (c) = Cosigned By     Initials Name Provider Type    BB Nissa Bey COTA Occupational Therapist Assistant               Goals/Plan     Row Name 12/22/22 0715          Transfer Goal 1 (OT)    Activity/Assistive Device (Transfer Goal 1, OT) bed-to-chair/chair-to-bed;commode, bedside without drop arms;commode, bedside with drop arms;walker, rolling  -BB     [place] Level/Cues Needed (Transfer Goal 1, OT) minimum assist (75% or more patient effort)  -BB     Time Frame (Transfer Goal 1, OT) long term goal (LTG);by discharge  -BB     Progress/Outcome (Transfer Goal 1, OT) goal not met  -BB     Row Name 12/22/22 0715          Bathing Goal 1 (OT)    Activity/Device (Bathing Goal 1, OT) upper body bathing  -BB     [place] Level/Cues Needed (Bathing Goal 1, OT) standby assist  -BB     Time Frame (Bathing Goal 1, OT) long term goal (LTG);by discharge  -BB     Progress/Outcomes (Bathing Goal 1, OT) goal not met  -BB     Row Name 12/22/22 0715          Dressing Goal 1 (OT)    Activity/Device (Dressing Goal 1, OT) upper body dressing  -BB     [place]/Cues Needed (Dressing Goal 1, OT) standby assist  -BB     Time Frame (Dressing Goal 1, OT) long term goal (LTG);by discharge  -BB     Progress/Outcome (Dressing Goal 1, OT) goal not met  -BB     Row Name 12/22/22 0715          Grooming Goal 1 (OT)    Activity/Device (Grooming Goal 1, OT) grooming skills, all  -BB     [place] (Grooming Goal 1, OT) standby assist  -BB     Time Frame (Grooming Goal 1, OT) long term goal (LTG);by discharge  -BB     Progress/Outcome (Grooming Goal 1, OT) goal met  -BB     Row Name 12/22/22 0715          Self-Feeding Goal 1 (OT)    Activity/Device (Self-Feeding Goal 1, OT) self-feeding skills, all  -BB     [place] Level/Cues Needed (Self-Feeding Goal 1, OT) independent  -BB     Time Frame (Self-Feeding Goal 1, OT) long term goal (LTG);by discharge  -BB     Progress/Outcomes (Self-Feeding Goal 1, OT) goal met  -BB           User Key  (r) =  Recorded By, (t) = Taken By, (c) = Cosigned By    Initials Name Provider Type    Nissa Guillermo COTA Occupational Therapist Assistant               Clinical Impression     Row Name 12/22/22 0715          Pain Assessment    Pretreatment Pain Rating 8/10  -BB     Posttreatment Pain Rating 7/10  ending pain not assessed but pt appears much more comfortable.  -BB     Pain Location - Side/Orientation Left  -BB     Pain Location lower  -BB     Pain Location - extremity  -BB     Pain Intervention(s) Repositioned  -BB     Row Name 12/22/22 0715          Plan of Care Review    Plan of Care Reviewed With patient  -BB     Progress no change  -BB     Outcome Evaluation Pt agrees to OT this am. Pt is Mod I for grooming and Independent for self feeding tasks. Following OT tx, pt sitting EOB with all needs within reach. Continue OT POC  -BB     Row Name 12/22/22 0715          Therapy Assessment/Plan (OT)    Rehab Potential (OT) good, to achieve stated therapy goals  -BB     Criteria for Skilled Therapeutic Interventions Met (OT) yes;meets criteria;skilled treatment is necessary  -BB     Therapy Frequency (OT) daily  -BB     Row Name 12/22/22 0715          Vital Signs    Pre Systolic BP Rehab 108  -BB     Pre Treatment Diastolic BP 68  -BB     Pretreatment Heart Rate (beats/min) 83  -BB     Pre SpO2 (%) 98  -BB     O2 Delivery Pre Treatment supplemental O2  -BB     Pre Patient Position Supine  -BB     Row Name 12/22/22 0715          Positioning and Restraints    Pre-Treatment Position in bed  -BB     Post Treatment Position bed  -BB     In Bed sitting EOB;call light within reach;encouraged to call for assist;exit alarm on  -BB           User Key  (r) = Recorded By, (t) = Taken By, (c) = Cosigned By    Initials Name Provider Type    Nissa Guillermo COTA Occupational Therapist Assistant               Outcome Measures     Row Name 12/22/22 0715          How much help from another is currently needed...    Putting on  and taking off regular lower body clothing? 1  -BB     Bathing (including washing, rinsing, and drying) 1  -BB     Toileting (which includes using toilet bed pan or urinal) 1  -BB     Putting on and taking off regular upper body clothing 2  -BB     Taking care of personal grooming (such as brushing teeth) 4  -BB     Eating meals 4  -BB     AM-PAC 6 Clicks Score (OT) 13  -BB     Row Name 12/22/22 1023          How much help from another person do you currently need...    Turning from your back to your side while in flat bed without using bedrails? 3  -SK     Moving from lying on back to sitting on the side of a flat bed without bedrails? 2  -SK     Moving to and from a bed to a chair (including a wheelchair)? 2  -SK     Standing up from a chair using your arms (e.g., wheelchair, bedside chair)? 2  -SK     Climbing 3-5 steps with a railing? 1  -SK     To walk in hospital room? 1  -SK     AM-PAC 6 Clicks Score (PT) 11  -SK     Highest level of mobility 4 --> Transferred to chair/commode  -SK           User Key  (r) = Recorded By, (t) = Taken By, (c) = Cosigned By    Initials Name Provider Type    Nissa Guillermo COTA Occupational Therapist Assistant    Lety Dyer RN Registered Nurse                Occupational Therapy Education     Title: PT OT SLP Therapies (In Progress)     Topic: Occupational Therapy (In Progress)     Point: ADL training (In Progress)     Description:   Instruct learner(s) on proper safety adaptation and remediation techniques during self care or transfers.   Instruct in proper use of assistive devices.              Learning Progress Summary           Patient Acceptance, E, NR by JO at 12/22/2022 1120    Acceptance, E, NR by JO at 12/21/2022 1431    Acceptance, E,TB, NR by  at 12/20/2022 0919    Comment: OT POC, Role of OT, transfer training                   Point: Home exercise program (Not Started)     Description:   Instruct learner(s) on appropriate technique for  monitoring, assisting and/or progressing therapeutic exercises/activities.              Learner Progress:  Not documented in this visit.          Point: Precautions (In Progress)     Description:   Instruct learner(s) on prescribed precautions during self-care and functional transfers.              Learning Progress Summary           Patient Acceptance, E,TB, NR by  at 12/20/2022 0919    Comment: OT POC, Role of OT, transfer training                   Point: Body mechanics (In Progress)     Description:   Instruct learner(s) on proper positioning and spine alignment during self-care, functional mobility activities and/or exercises.              Learning Progress Summary           Patient Acceptance, E, NR by  at 12/22/2022 1120    Acceptance, E,TB, NR by  at 12/20/2022 0919    Comment: OT POC, Role of OT, transfer training                               User Key     Initials Effective Dates Name Provider Type Discipline     06/16/21 -  Nissa Bey COTA Occupational Therapist Assistant OT     08/11/22 -  Deanna Quiles OT Occupational Therapist OT              OT Recommendation and Plan  Therapy Frequency (OT): daily  Plan of Care Review  Plan of Care Reviewed With: patient  Progress: no change  Outcome Evaluation: Pt agrees to OT this am. Pt is Mod I for grooming and Independent for self feeding tasks. Following OT tx, pt sitting EOB with all needs within reach. Continue OT POC     Time Calculation:    Time Calculation- OT     Row Name 12/22/22 1120             Time Calculation- OT    OT Start Time 0715  -BB      OT Stop Time 0740  -BB      OT Time Calculation (min) 25 min  -BB      Total Timed Code Minutes- OT 25 minute(s)  -BB      OT Received On 12/22/22  -BB         Timed Charges    68641 - OT Self Care/Mgmt Minutes 25  -BB         Total Minutes    Timed Charges Total Minutes 25  -BB       Total Minutes 25  -BB            User Key  (r) = Recorded By, (t) = Taken By, (c) = Cosigned By     Initials Name Provider Type    BB Nissa Bey COTA Occupational Therapist Assistant              Therapy Charges for Today     Code Description Service Date Service Provider Modifiers Qty    37688004031 HC OT SELF CARE/MGMT/TRAIN EA 15 MIN 12/21/2022 Nissa Bey COTA GO 2    37766916183 HC OT SELF CARE/MGMT/TRAIN EA 15 MIN 12/22/2022 Nissa Bey COTA GO 2               ISHAN Mcgovern  12/22/2022

## 2022-12-23 ENCOUNTER — HOME CARE VISIT (OUTPATIENT)
Dept: HOME HEALTH SERVICES | Facility: HOME HEALTHCARE | Age: 62
End: 2022-12-23

## 2022-12-23 VITALS
BODY MASS INDEX: 45.45 KG/M2 | WEIGHT: 247 LBS | HEIGHT: 62 IN | HEART RATE: 80 BPM | SYSTOLIC BLOOD PRESSURE: 100 MMHG | DIASTOLIC BLOOD PRESSURE: 66 MMHG | OXYGEN SATURATION: 97 % | TEMPERATURE: 98.2 F | RESPIRATION RATE: 18 BRPM

## 2022-12-23 LAB
ALBUMIN SERPL-MCNC: 3.2 G/DL (ref 3.5–5.2)
ALBUMIN/GLOB SERPL: 0.7 G/DL
ALP SERPL-CCNC: 172 U/L (ref 39–117)
ALT SERPL W P-5'-P-CCNC: <5 U/L (ref 1–33)
ANION GAP SERPL CALCULATED.3IONS-SCNC: 18 MMOL/L (ref 5–15)
AST SERPL-CCNC: 13 U/L (ref 1–32)
BASOPHILS # BLD MANUAL: 0.13 10*3/MM3 (ref 0–0.2)
BASOPHILS NFR BLD MANUAL: 1 % (ref 0–1.5)
BILIRUB SERPL-MCNC: 0.3 MG/DL (ref 0–1.2)
BUN SERPL-MCNC: 71 MG/DL (ref 8–23)
BUN/CREAT SERPL: 12.1 (ref 7–25)
CALCIUM SPEC-SCNC: 9.5 MG/DL (ref 8.6–10.5)
CHLORIDE SERPL-SCNC: 86 MMOL/L (ref 98–107)
CO2 SERPL-SCNC: 23 MMOL/L (ref 22–29)
CREAT SERPL-MCNC: 5.87 MG/DL (ref 0.57–1)
CRP SERPL-MCNC: 8.53 MG/DL (ref 0–0.5)
DEPRECATED RDW RBC AUTO: 48.1 FL (ref 37–54)
EGFRCR SERPLBLD CKD-EPI 2021: 7.6 ML/MIN/1.73
EOSINOPHIL # BLD MANUAL: 0.26 10*3/MM3 (ref 0–0.4)
EOSINOPHIL NFR BLD MANUAL: 2 % (ref 0.3–6.2)
ERYTHROCYTE [DISTWIDTH] IN BLOOD BY AUTOMATED COUNT: 16 % (ref 12.3–15.4)
GLOBULIN UR ELPH-MCNC: 4.7 GM/DL
GLUCOSE BLDC GLUCOMTR-MCNC: 231 MG/DL (ref 70–130)
GLUCOSE BLDC GLUCOMTR-MCNC: 288 MG/DL (ref 70–130)
GLUCOSE BLDC GLUCOMTR-MCNC: 305 MG/DL (ref 70–130)
GLUCOSE SERPL-MCNC: 229 MG/DL (ref 65–99)
HCT VFR BLD AUTO: 35.9 % (ref 34–46.6)
HGB BLD-MCNC: 11.8 G/DL (ref 12–15.9)
LYMPHOCYTES # BLD MANUAL: 3.87 10*3/MM3 (ref 0.7–3.1)
LYMPHOCYTES NFR BLD MANUAL: 6 % (ref 5–12)
MCH RBC QN AUTO: 27.6 PG (ref 26.6–33)
MCHC RBC AUTO-ENTMCNC: 32.9 G/DL (ref 31.5–35.7)
MCV RBC AUTO: 83.9 FL (ref 79–97)
MONOCYTES # BLD: 0.77 10*3/MM3 (ref 0.1–0.9)
NEUTROPHILS # BLD AUTO: 7.88 10*3/MM3 (ref 1.7–7)
NEUTROPHILS NFR BLD MANUAL: 61 % (ref 42.7–76)
NEUTS VAC BLD QL SMEAR: ABNORMAL
PLAT MORPH BLD: NORMAL
PLATELET # BLD AUTO: 205 10*3/MM3 (ref 140–450)
PMV BLD AUTO: 8.6 FL (ref 6–12)
POTASSIUM SERPL-SCNC: 4.4 MMOL/L (ref 3.5–5.2)
PROT SERPL-MCNC: 7.9 G/DL (ref 6–8.5)
RBC # BLD AUTO: 4.28 10*6/MM3 (ref 3.77–5.28)
RBC MORPH BLD: NORMAL
SODIUM SERPL-SCNC: 127 MMOL/L (ref 136–145)
TOXIC GRANULATION: ABNORMAL
VARIANT LYMPHS NFR BLD MANUAL: 1 % (ref 0–5)
VARIANT LYMPHS NFR BLD MANUAL: 29 % (ref 19.6–45.3)
WBC NRBC COR # BLD: 12.91 10*3/MM3 (ref 3.4–10.8)

## 2022-12-23 PROCEDURE — 25010000002 HEPARIN (PORCINE) PER 1000 UNITS: Performed by: INTERNAL MEDICINE

## 2022-12-23 PROCEDURE — 25010000002 HEPARIN (PORCINE) PER 1000 UNITS: Performed by: ORTHOPAEDIC SURGERY

## 2022-12-23 PROCEDURE — 85007 BL SMEAR W/DIFF WBC COUNT: CPT | Performed by: ORTHOPAEDIC SURGERY

## 2022-12-23 PROCEDURE — 85027 COMPLETE CBC AUTOMATED: CPT | Performed by: ORTHOPAEDIC SURGERY

## 2022-12-23 PROCEDURE — 80053 COMPREHEN METABOLIC PANEL: CPT | Performed by: ORTHOPAEDIC SURGERY

## 2022-12-23 PROCEDURE — 63710000001 INSULIN ASPART PER 5 UNITS: Performed by: ORTHOPAEDIC SURGERY

## 2022-12-23 PROCEDURE — 82962 GLUCOSE BLOOD TEST: CPT

## 2022-12-23 PROCEDURE — 86140 C-REACTIVE PROTEIN: CPT | Performed by: ORTHOPAEDIC SURGERY

## 2022-12-23 PROCEDURE — 99024 POSTOP FOLLOW-UP VISIT: CPT | Performed by: ORTHOPAEDIC SURGERY

## 2022-12-23 PROCEDURE — 63710000001 INSULIN DETEMIR PER 5 UNITS: Performed by: HOSPITALIST

## 2022-12-23 RX ORDER — CALCIUM CARBONATE 200(500)MG
2 TABLET,CHEWABLE ORAL 2 TIMES DAILY PRN
Start: 2022-12-23 | End: 2023-02-24 | Stop reason: ALTCHOICE

## 2022-12-23 RX ORDER — ONDANSETRON 4 MG/1
4 TABLET, FILM COATED ORAL EVERY 6 HOURS PRN
Start: 2022-12-23

## 2022-12-23 RX ORDER — GABAPENTIN 300 MG/1
300 CAPSULE ORAL DAILY
Qty: 3 CAPSULE | Refills: 0 | Status: ON HOLD | OUTPATIENT
Start: 2022-12-23 | End: 2023-03-16 | Stop reason: SDUPTHER

## 2022-12-23 RX ORDER — TEMAZEPAM 15 MG/1
15 CAPSULE ORAL NIGHTLY PRN
Qty: 3 CAPSULE | Refills: 0 | Status: SHIPPED | OUTPATIENT
Start: 2022-12-23 | End: 2022-12-29

## 2022-12-23 RX ORDER — LOSARTAN POTASSIUM 25 MG/1
25 TABLET ORAL DAILY
Start: 2022-12-24

## 2022-12-23 RX ORDER — FUROSEMIDE 80 MG
80 TABLET ORAL
Status: ON HOLD
Start: 2022-12-24 | End: 2023-03-15

## 2022-12-23 RX ORDER — OXYCODONE HYDROCHLORIDE 10 MG/1
10 TABLET ORAL 4 TIMES DAILY PRN
Qty: 8 TABLET | Refills: 0 | Status: ON HOLD | OUTPATIENT
Start: 2022-12-23 | End: 2023-03-16 | Stop reason: SDUPTHER

## 2022-12-23 RX ORDER — GABAPENTIN 300 MG/1
300 CAPSULE ORAL DAILY
Start: 2022-12-23 | End: 2022-12-23 | Stop reason: SDUPTHER

## 2022-12-23 RX ORDER — GABAPENTIN 300 MG/1
300 CAPSULE ORAL 2 TIMES DAILY
Start: 2022-12-23 | End: 2022-12-23 | Stop reason: HOSPADM

## 2022-12-23 RX ORDER — ALBUMIN (HUMAN) 12.5 G/50ML
12.5 SOLUTION INTRAVENOUS AS NEEDED
Status: DISCONTINUED | OUTPATIENT
Start: 2022-12-23 | End: 2022-12-23 | Stop reason: HOSPADM

## 2022-12-23 RX ORDER — CARVEDILOL 6.25 MG/1
6.25 TABLET ORAL 2 TIMES DAILY WITH MEALS
Start: 2022-12-23

## 2022-12-23 RX ORDER — HEPARIN SODIUM 1000 [USP'U]/ML
4000 INJECTION, SOLUTION INTRAVENOUS; SUBCUTANEOUS AS NEEDED
Status: DISCONTINUED | OUTPATIENT
Start: 2022-12-23 | End: 2022-12-23 | Stop reason: HOSPADM

## 2022-12-23 RX ORDER — INSULIN LISPRO 100 [IU]/ML
14-35 INJECTION, SOLUTION INTRAVENOUS; SUBCUTANEOUS
Start: 2022-12-23

## 2022-12-23 RX ADMIN — SEVELAMER CARBONATE 800 MG: 800 TABLET, FILM COATED ORAL at 11:22

## 2022-12-23 RX ADMIN — DULOXETINE HYDROCHLORIDE 20 MG: 20 CAPSULE, DELAYED RELEASE ORAL at 09:42

## 2022-12-23 RX ADMIN — INSULIN ASPART 7 UNITS: 100 INJECTION, SOLUTION INTRAVENOUS; SUBCUTANEOUS at 17:07

## 2022-12-23 RX ADMIN — GABAPENTIN 300 MG: 300 CAPSULE ORAL at 09:43

## 2022-12-23 RX ADMIN — Medication 10 ML: at 09:43

## 2022-12-23 RX ADMIN — OXYCODONE HYDROCHLORIDE 10 MG: 10 TABLET ORAL at 09:42

## 2022-12-23 RX ADMIN — HEPARIN SODIUM 5000 UNITS: 5000 INJECTION INTRAVENOUS; SUBCUTANEOUS at 06:13

## 2022-12-23 RX ADMIN — INSULIN ASPART 6 UNITS: 100 INJECTION, SOLUTION INTRAVENOUS; SUBCUTANEOUS at 11:22

## 2022-12-23 RX ADMIN — DOCUSATE SODIUM 200 MG: 100 CAPSULE, LIQUID FILLED ORAL at 09:42

## 2022-12-23 RX ADMIN — SEVELAMER CARBONATE 800 MG: 800 TABLET, FILM COATED ORAL at 07:49

## 2022-12-23 RX ADMIN — INSULIN DETEMIR 20 UNITS: 100 INJECTION, SOLUTION SUBCUTANEOUS at 09:42

## 2022-12-23 RX ADMIN — HEPARIN SODIUM 5000 UNITS: 5000 INJECTION INTRAVENOUS; SUBCUTANEOUS at 15:09

## 2022-12-23 RX ADMIN — HEPARIN SODIUM 4000 UNITS: 1000 INJECTION INTRAVENOUS; SUBCUTANEOUS at 08:59

## 2022-12-23 RX ADMIN — INSULIN ASPART 4 UNITS: 100 INJECTION, SOLUTION INTRAVENOUS; SUBCUTANEOUS at 07:49

## 2022-12-23 NOTE — PROGRESS NOTES
ORTHOPEDIC PROGRESS NOTE:    Name:  Yamileth Slater  Date:    2022  Date of admission:  12/15/2022    Post op day:  7 Days Post-Op  Procedure:    Procedure(s) (LRB):  AMPUTATION BELOW KNEE, LEFT (Left)    Subjective:  No new complaints  No fever or chills  Getting dialysis currently    Vitals:     Vitals:    22 1030   BP: 122/62   Pulse: 82   Resp: 19   Temp:    SpO2:       Temp (24hrs), Av.8 °F (36.6 °C), Min:97 °F (36.1 °C), Max:98.2 °F (36.8 °C)      Exam:  Awake and alert  Dressing clean and dry.  Discussion with nursing re:  Dressing change yesterday.   Wound is CLD   Minimal erythema   No drainage  Image reviewed from dressing change yesterday    ASSESSMENT:  Active Hospital Problems    Diagnosis  POA   • **Venous insufficiency [I87.2]  Yes   • Left foot pain [M79.672]  Yes     Added automatically from request for surgery [id number]     • 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 ([facility]) [L97.524]  Yes     Added automatically from request for surgery [id number]     • On home oxygen therapy [Z99.81]  Not Applicable     Added automatically from request for surgery [id number]     • ESRD on hemodialysis ([facility]) [N18.6, Z99.2]  Not Applicable     -Receives hemodialysis MWF at [facility] in [place]  Missed dialysis per family prior to arrival   Nephrology consulted appreciated recs          • MIKE and COPD overlap syndrome ([facility]) [G47.33, J44.9]  Yes     -Home Trilogy/CPAP continue   -Home 3L oxygen dependent. Maintain strict Sats between 88-92%       • Pulmonary hypertension ([facility]) [I27.20]  Yes   • Coronary artery disease involving native coronary artery of native heart without angina pectoris [I25.10]  Yes     - S/P CABG, Bilateral carotid artery stenosis w/ 2 stents on left side,  PAD (peripheral artery disease) with stent in right upper leg  - Cardiology on board       • Morbid obesity with BMI of 50.0-59.9, adult ([facility]) [E66.01, Z68.43]  Not Applicable     Body mass index is 56.52 kg/m².  -  Consistent carb, cardiac, renal diet     • Carotid artery disease (HCC) [I77.9]  Yes     -Status post stenting  -Continue home medications     • PAD (peripheral artery disease) ([facility]) [I73.9]  Yes     · Continue ASA 81mg, Clopidogrel 75mg, Atorvastatin 40mg           PLAN:    S/p left BKA  Slowly progress as tolerated  Up with PT/OT  Continue working on left leg extension  Conditioning  DVT prophylaxis  Disposition will need to consider mobility issues as well as dialysis.       12/23/22 at 10:59 CST by Huy White MD

## 2022-12-23 NOTE — SIGNIFICANT NOTE
12/23/22 1606   OTHER   Discipline physical therapy assistant   Rehab Time/Intention   Session Not Performed patient unavailable for treatment  (Tx attempted several times this date, pt with other staff each time. Will check back tomorrow.)

## 2022-12-23 NOTE — PLAN OF CARE
Goal Outcome Evaluation:   Patient is becoming more independent, asking for what she needs and assisting in her own care. VSS, pain controled, emotional needs met. No concerns at this time.

## 2022-12-23 NOTE — PLAN OF CARE
Goal Outcome Evaluation:              Outcome Evaluation: dressing changed this shift per orders. VSS.

## 2022-12-23 NOTE — PROGRESS NOTES
[facility]  [address] [place] [address]. [address]  T - [phone]  F - [phone]     Progress Note          PATIENT  DEMOGRAPHICS   PATIENT NAME: Yamileth Slater                      PHYSICIAN: Ace Lauren MD  : 1960  MRN: [id number]   LOS: 8 days    Patient Care Team:  Kiersten Wilson APRN as PCP - General (Family Medicine)  Subjective   SUBJECTIVE   No new complaints. She has some stump pain           Objective   OBJECTIVE   Vital Signs  Temp:  [97 °F (36.1 °C)-98.2 °F (36.8 °C)] 98.2 °F (36.8 °C)  Heart Rate:  [74-89] 80  Resp:  [18-20] 20  BP: (117-168)/(58-89) 128/71    Flowsheet Rows    Flowsheet Row First Filed Value   Admission Height 157.5 cm (62\") Documented at 12/15/2022 1700   Admission Weight 124 kg (273 lb 4.8 oz) Documented at 12/15/2022 1700           I/O last 3 completed shifts:  In: 480 [P.O.:480]  Out: -     PHYSICAL EXAM    Physical Exam  Vitals reviewed.   Constitutional:       Appearance: Normal appearance.   HENT:      Head: Normocephalic and atraumatic.      Right Ear: Tympanic membrane normal.      Left Ear: Tympanic membrane normal.      Nose: Nose normal.      Mouth/Throat:      Mouth: Mucous membranes are moist.   Eyes:      Pupils: Pupils are equal, round, and reactive to light.   Cardiovascular:      Rate and Rhythm: Normal rate.      Pulses: Normal pulses.      Heart sounds: Normal heart sounds.   Pulmonary:      Effort: Pulmonary effort is normal.      Breath sounds: Normal breath sounds.   Abdominal:      General: Abdomen is flat. Bowel sounds are normal.      Palpations: Abdomen is soft.   Musculoskeletal:         General: Normal range of motion.      Cervical back: Normal range of motion and neck supple.      Comments: Left BKA   Skin:     General: Skin is warm and dry.      Capillary Refill: Capillary refill takes less than 2 seconds.   Neurological:      General: No focal deficit present.      Mental Status: She is alert.    Psychiatric:         Mood and Affect: Mood normal.         RESULTS   Results Review:    Results from last 7 days   Lab Units 12/23/22  0544 12/22/22  0652 12/21/22  0641   SODIUM mmol/L 127* 128* 132*   POTASSIUM mmol/L 4.4 4.0 4.5   CHLORIDE mmol/L 86* 86* 91*   CO2 mmol/L 23.0 21.0* 21.0*   BUN mg/dL 71* 54* 68*   CREATININE mg/dL 5.87* 4.88* 6.07*   CALCIUM mg/dL 9.5 9.5 9.8   BILIRUBIN mg/dL 0.3 0.3 0.3   ALK PHOS U/L 172* 198* 203*   ALT (SGPT) U/L <5 <5 <5   AST (SGOT) U/L 13 12 9   GLUCOSE mg/dL 229* 354* 270*       Estimated Creatinine Clearance: 11.6 mL/min (A) (by C-G formula based on SCr of 5.87 mg/dL (H)).                Results from last 7 days   Lab Units 12/23/22  0544 12/22/22  0652 12/21/22  0641 12/20/22  0522 12/19/22  0600   WBC 10*3/mm3 12.91* 13.31* 12.79* 13.52* 9.77   HEMOGLOBIN g/dL 11.8* 12.4 12.5 13.1 12.3   PLATELETS 10*3/mm3 205 241 251 281 259               Imaging Results (Last 24 Hours)     ** No results found for the last 24 hours. **           MEDICATIONS    atorvastatin, 20 mg, Oral, Nightly  carvedilol, 6.25 mg, Oral, BID With Meals  docusate sodium, 200 mg, Oral, BID  DULoxetine, 20 mg, Oral, Daily  furosemide, 80 mg, Oral, Once per day on Sun Tue Thu Sat  gabapentin, 300 mg, Oral, Once per day on Mon Wed Fri   And  gabapentin, 300 mg, Oral, 2 times per day on Sun Tue Thu Sat  heparin (porcine), 5,000 Units, Subcutaneous, Q8H  Insulin Aspart, 0-9 Units, Subcutaneous, TID AC  insulin detemir, 20 Units, Subcutaneous, Q12H  losartan, 25 mg, Oral, Daily  memantine, 5 mg, Oral, BID  pantoprazole, 40 mg, Oral, Nightly  sevelamer, 800 mg, Oral, TID With Meals  sodium chloride, 10 mL, Intravenous, Q12H      O2, 2 L/min  sodium chloride, 1,000 mL        Assessment & Plan   ASSESSMENT / PLAN      Venous insufficiency    Morbid obesity with BMI of 50.0-59.9, adult (HCC)    Coronary artery disease involving native coronary artery of native heart without angina pectoris    Carotid artery  disease (HCC)    PAD (peripheral artery disease) (HCC)    Pulmonary hypertension (HCC)    MIKE and COPD overlap syndrome (HCC)    ESRD on hemodialysis (HCC)    Left foot pain    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  She is currently dialyzing at [facility]. her current access is tunneled catheter.  She has prior right upper extremity failed AV fistula. She has recurrent hyperkalemia and had hd Friday and Saturday on admission. k remains stable now. HD today  Losartan lowered due to high k     keep diuretic non dialysis days (makes reasonable urine and for kaliuresis / volume overload issues)     2- anemia chronic kidney disease patient has history of B12 deficiency and AV malformation.  hemoglobin is more than 10.  Epogen has been discontinued      3.diabetes mellitus type 2 with nonhealing wound on the left foot and now  left BKA     4. CAD     5. ckd mbd on sevelamer in out patient setting     6. htn stable on coreg                 This document has been electronically signed by Ace Lauren MD on December 23, 2022 09:31 CST

## 2022-12-23 NOTE — PROGRESS NOTES
[facility]  [address] [place] [address]. [address]  T - [phone]  F - [phone]     Progress Note          PATIENT  DEMOGRAPHICS   PATIENT NAME: Yamileth Slater                      PHYSICIAN: Ace Lauren MD  : 1960  MRN: [id number]   LOS: 8 days    Patient Care Team:  Kiersten Wilson APRN as PCP - General (Family Medicine)  Subjective   SUBJECTIVE   C/o post surgical pain. Getting dialysis at present. bp is stable. Plan for 4L UF         Objective   OBJECTIVE   Vital Signs  Temp:  [97 °F (36.1 °C)-98.2 °F (36.8 °C)] 98.2 °F (36.8 °C)  Heart Rate:  [74-89] 80  Resp:  [18-20] 20  BP: (117-168)/(58-89) 128/71    Flowsheet Rows    Flowsheet Row First Filed Value   Admission Height 157.5 cm (62\") Documented at 12/15/2022 1700   Admission Weight 124 kg (273 lb 4.8 oz) Documented at 12/15/2022 1700           I/O last 3 completed shifts:  In: 480 [P.O.:480]  Out: -     PHYSICAL EXAM    Physical Exam  Vitals reviewed.   Constitutional:       Appearance: Normal appearance.   HENT:      Head: Normocephalic and atraumatic.      Right Ear: Tympanic membrane normal.      Left Ear: Tympanic membrane normal.      Nose: Nose normal.      Mouth/Throat:      Mouth: Mucous membranes are moist.   Eyes:      Pupils: Pupils are equal, round, and reactive to light.   Cardiovascular:      Rate and Rhythm: Normal rate.      Pulses: Normal pulses.      Heart sounds: Normal heart sounds.   Pulmonary:      Effort: Pulmonary effort is normal.      Breath sounds: Normal breath sounds.   Abdominal:      General: Abdomen is flat. Bowel sounds are normal.      Palpations: Abdomen is soft.   Musculoskeletal:         General: Normal range of motion.      Cervical back: Normal range of motion and neck supple.      Comments: Left BKA   Skin:     General: Skin is warm and dry.      Capillary Refill: Capillary refill takes less than 2 seconds.   Neurological:      General: No focal deficit present.       Mental Status: She is alert.   Psychiatric:         Mood and Affect: Mood normal.         RESULTS   Results Review:    Results from last 7 days   Lab Units 12/23/22  0544 12/22/22  0652 12/21/22  0641   SODIUM mmol/L 127* 128* 132*   POTASSIUM mmol/L 4.4 4.0 4.5   CHLORIDE mmol/L 86* 86* 91*   CO2 mmol/L 23.0 21.0* 21.0*   BUN mg/dL 71* 54* 68*   CREATININE mg/dL 5.87* 4.88* 6.07*   CALCIUM mg/dL 9.5 9.5 9.8   BILIRUBIN mg/dL 0.3 0.3 0.3   ALK PHOS U/L 172* 198* 203*   ALT (SGPT) U/L <5 <5 <5   AST (SGOT) U/L 13 12 9   GLUCOSE mg/dL 229* 354* 270*       Estimated Creatinine Clearance: 11.6 mL/min (A) (by C-G formula based on SCr of 5.87 mg/dL (H)).                Results from last 7 days   Lab Units 12/23/22  0544 12/22/22  0652 12/21/22  0641 12/20/22  0522 12/19/22  0600   WBC 10*3/mm3 12.91* 13.31* 12.79* 13.52* 9.77   HEMOGLOBIN g/dL 11.8* 12.4 12.5 13.1 12.3   PLATELETS 10*3/mm3 205 241 251 281 259               Imaging Results (Last 24 Hours)     ** No results found for the last 24 hours. **           MEDICATIONS    atorvastatin, 20 mg, Oral, Nightly  carvedilol, 6.25 mg, Oral, BID With Meals  docusate sodium, 200 mg, Oral, BID  DULoxetine, 20 mg, Oral, Daily  furosemide, 80 mg, Oral, Once per day on Sun Tue Thu Sat  gabapentin, 300 mg, Oral, Once per day on Mon Wed Fri   And  gabapentin, 300 mg, Oral, 2 times per day on Sun Tue Thu Sat  heparin (porcine), 5,000 Units, Subcutaneous, Q8H  Insulin Aspart, 0-9 Units, Subcutaneous, TID AC  insulin detemir, 20 Units, Subcutaneous, Q12H  losartan, 25 mg, Oral, Daily  memantine, 5 mg, Oral, BID  pantoprazole, 40 mg, Oral, Nightly  sevelamer, 800 mg, Oral, TID With Meals  sodium chloride, 10 mL, Intravenous, Q12H      O2, 2 L/min  sodium chloride, 1,000 mL        Assessment & Plan   ASSESSMENT / PLAN      Venous insufficiency    Morbid obesity with BMI of 50.0-59.9, adult (HCC)    Coronary artery disease involving native coronary artery of native heart without angina  pectoris    Carotid artery disease (HCC)    PAD (peripheral artery disease) (HCC)    Pulmonary hypertension (HCC)    MIKE and COPD overlap syndrome (HCC)    ESRD on hemodialysis (HCC)    Left foot pain    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  She is currently dialyzing at [facility]. her current access is tunneled catheter.  She has prior right upper extremity failed AV fistula. She has recurrent hyperkalemia and had hd Friday and Saturday on admission. k remains stable now. HD today. EDW will be adjusted after amputation. Plan for 4 L UF today      Losartan lowered due to high k     keep diuretic non dialysis days (makes reasonable urine and for kaliuresis / volume overload issues)     2- anemia chronic kidney disease patient has history of B12 deficiency and AV malformation.  hemoglobin is more than 10.  Epogen has been discontinued      3.diabetes mellitus type 2 with nonhealing wound on the left foot and now  left BKA     4. CAD     5. ckd mbd on sevelamer in out patient setting     6. htn stable on coreg and losartan                 This document has been electronically signed by Ace Lauren MD on December 23, 2022 09:34 CST

## 2022-12-23 NOTE — PLAN OF CARE
Problem: Adult Inpatient Plan of Care  Goal: Plan of Care Review  12/22/2022 2342 by Kamilla Epperson, RN  Outcome: Ongoing, Progressing   Goal Outcome Evaluation:

## 2022-12-23 NOTE — DISCHARGE SUMMARY
[facility] Medicine Services  DISCHARGE SUMMARY       Date of Admission: 12/15/2022  Date of Discharge:  12/23/2022  Primary Care Physician: Kiersten Wilson APRN    Presenting Problem/History of Present Illness:  Venous insufficiency [I87.2]       Final Discharge Diagnoses:  Active Hospital Problems    Diagnosis    • **Venous insufficiency    • Left foot pain    • 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 (HCC)    • On home oxygen therapy    • ESRD on hemodialysis (HCC)    • MIKE and COPD overlap syndrome ([facility])    • Pulmonary hypertension (HCC)    • Coronary artery disease involving native coronary artery of native heart without angina pectoris    • Morbid obesity with BMI of 50.0-59.9, adult ([facility])    • Carotid artery disease ([facility])    • PAD (peripheral artery disease) ([facility])        Consults:   Consults     Date and Time Order Name Status Description    12/15/2022  5:31 PM Inpatient Orthopedic Surgery Consult      12/15/2022  5:31 PM Inpatient Nephrology Consult Completed           Procedures Performed: Procedure(s):  AMPUTATION BELOW KNEE, LEFT                Pertinent Test Results:   Lab Results (most recent)     Procedure Component Value Units Date/Time    POC Glucose Once [[id number]]  (Abnormal) Collected: 12/23/22 1012    Specimen: Blood Updated: 12/23/22 1058     Glucose 288 mg/dL      Comment: RN NotifiedOperator: [id number] ANGY Jostleter ID: [id number]       POC Glucose Once [[id number]]  (Abnormal) Collected: 12/23/22 0711    Specimen: Blood Updated: 12/23/22 0738     Glucose 231 mg/dL      Comment: RN NotifiedOperator: [id number] ANGY HLH ELECTRONICSAMeter ID: [id number]       CBC & Differential [[id number]]  (Abnormal) Collected: 12/23/22 0544    Specimen: Blood Updated: 12/23/22 0725    Narrative:      The following orders were created for panel order CBC & Differential.  Procedure                               Abnormality         Status                      ---------                               -----------         ------                     Manual Differential[[id number]]          Abnormal            Final result               CBC Auto Differential[[id number]]        Abnormal            Final result                 Please view results for these tests on the individual orders.    Manual Differential [[id number]]  (Abnormal) Collected: 12/23/22 0544    Specimen: Blood Updated: 12/23/22 0725     Neutrophil % 61.0 %      Lymphocyte % 29.0 %      Monocyte % 6.0 %      Eosinophil % 2.0 %      Basophil % 1.0 %      Atypical Lymphocyte % 1.0 %      Neutrophils Absolute 7.88 10*3/mm3      Lymphocytes Absolute 3.87 10*3/mm3      Monocytes Absolute 0.77 10*3/mm3      Eosinophils Absolute 0.26 10*3/mm3      Basophils Absolute 0.13 10*3/mm3      RBC Morphology Normal     Toxic Granulation Slight/1+     Vacuolated Neutrophils Slight/1+     Platelet Morphology Normal    Comprehensive Metabolic Panel [[id number]]  (Abnormal) Collected: 12/23/22 0544    Specimen: Blood Updated: 12/23/22 0615     Glucose 229 mg/dL      BUN 71 mg/dL      Creatinine 5.87 mg/dL      Sodium 127 mmol/L      Potassium 4.4 mmol/L      Chloride 86 mmol/L      CO2 23.0 mmol/L      Calcium 9.5 mg/dL      Total Protein 7.9 g/dL      Albumin 3.20 g/dL      ALT (SGPT) <5 U/L      AST (SGOT) 13 U/L      Alkaline Phosphatase 172 U/L      Total Bilirubin 0.3 mg/dL      Globulin 4.7 gm/dL      A/G Ratio 0.7 g/dL      BUN/Creatinine Ratio 12.1     Anion Gap 18.0 mmol/L      eGFR 7.6 mL/min/1.73      Comment: <15 Indicative of kidney failure       Narrative:      GFR Normal >60  Chronic Kidney Disease <60  Kidney Failure <15      C-reactive Protein [[id number]]  (Abnormal) Collected: 12/23/22 0544    Specimen: Blood Updated: 12/23/22 0615     C-Reactive Protein 8.53 mg/dL     CBC Auto Differential [[id number]]  (Abnormal) Collected: 12/23/22 0544    Specimen: Blood Updated: 12/23/22 0559     WBC 12.91  10*3/mm3      RBC 4.28 10*6/mm3      Hemoglobin 11.8 g/dL      Hematocrit 35.9 %      MCV 83.9 fL      MCH 27.6 pg      MCHC 32.9 g/dL      RDW 16.0 %      RDW-SD 48.1 fl      MPV 8.6 fL      Platelets 205 10*3/mm3     TISSUE EXAM, [facility] ([facility]) [[id number]] Collected: 22 1646    Specimen: Tissue from Leg, Left Updated: 22 1237     Reference Lab Report --     [facility]  [address] [place] [address]  Phone: [phone] or [phone]  Fax: [phone]  Nirmal Sheth M.D., Medical Director    PATIENT NAME                           LABORATORY NO.  [id number].                 [id number]  [id number]                         AGE              SEX  SSN           CLIENT REF #  [facility]           62      1960  F    [id number]   [id number]    [place]                       REQUESTING M.D.     ATTENDING M.D.     COPY TO.  [address] [place] [address]                 RANDOLPH DWYER ENOCH MUHLJUSTICE,  [address]  DATE COLLECTED      DATE RECEIVED      DATE REPORTED  2022    DIAGNOSIS:  EXTREMITY, AMPUTATION, NON - TRAUMATIC, LEFT LEG, BELOW  KNEE:  Status-post transmetatarsal amputation ([facility], 2022)  Nonhealing wound with gangrenous necrosis  Calcific atherosclerosis    JBS/pah    CLINICAL HISTORY:  Left foot pain, obstructive sleep apnea and chronic obstructive pulmonary  disease overlap syndrome, bilateral carotid artery stenosis, peripheral artery  disease, venous insufficiency, pulmonary hypertension, coronary artery disease  involving native coronary artery of native heart without angina pectoris, morbid  obesity with BMI of 45.0-49.9, adult, chronic kidney disease requiring chronic  dialysis, chronic ulcer of left foot with necrosis of bone, on home  oxygen  therapy    SPECIMENS RECEIVED:  EXTREMITY, AMPUTATION, NON - TRAUMATIC, LEFT LEG, BELOW KNEE    MICROSCOPIC DESCRIPTION:  Tissue blocks are prepared and slides are examined microscopically on all  specimens. See diagnosis for details.    Professional interpretation rendered by Sheng Hanson M.D. at [facility],  [place], [address] [place], [place] [id number].    GROSS DESCRIPTION:  Received fresh labeled \"left below-knee amputation\" per the requisition, is a  23.5 cm in length leg and a 14.1 cm in length foot.  The specimen is significant  for a 4.8 cm exposed portion of fibula and tibia.  The skin surface is significant  for a 8.1 x 4.9 cm tan-brown, ulcerated lesion located at the distal foot which  may be consistent with the previous amputation site.  This area of ulceration  grossly involves all 5 digits and is located 20.1 cm from the closest skin/soft  tissue margin which is inked blue.  The remaining skin surface is pink-white,  wrinkled and significant for a 0.8 cm in greatest dimension healing wound which  is located 10.3 cm from the previous mentioned nonhealing wound.  Additionally there is a 5.2 x 4.5 cm tattoo located at the soft tissue margin.  No  nails or nail beds are identified.  Dissection of the vasculature reveals grossly  patent and unremarkable lumens.  Representative sections are submitted as  follows:  A1: Bone marrow margin, following decalcification; A2: Vascular  margins, en face; A3: Skin/soft tissue margin; A4: Nonhealing ulcerated wound;  A5: Bone underlying nonhealing, ulcerated wound, following decalcification;  A6: Dissected vasculature; A7: Healing wound and area of tattoo.  MLA    REVIEWED, DIAGNOSED AND ELECTRONICALLY  SIGNED BY:    Sheng Hanson M.D.  CPT CODES:  50174      CBC & Differential [[id number]]  (Abnormal) Collected: 12/22/22 0652    Specimen: Blood Updated: 12/22/22 0937    Narrative:      The following orders were created for panel order CBC &  Differential.  Procedure                               Abnormality         Status                     ---------                               -----------         ------                     Manual Differential[[id number]]          Abnormal            Final result               CBC Auto Differential[[id number]]        Abnormal            Final result                 Please view results for these tests on the individual orders.    Manual Differential [[id number]]  (Abnormal) Collected: 12/22/22 0652    Specimen: Blood Updated: 12/22/22 0937     Neutrophil % 54.0 %      Lymphocyte % 32.0 %      Monocyte % 9.0 %      Eosinophil % 2.0 %      Basophil % 2.0 %      Atypical Lymphocyte % 1.0 %      Neutrophils Absolute 7.19 10*3/mm3      Lymphocytes Absolute 4.39 10*3/mm3      Monocytes Absolute 1.20 10*3/mm3      Eosinophils Absolute 0.27 10*3/mm3      Basophils Absolute 0.27 10*3/mm3      Anisocytosis Slight/1+     WBC Morphology Normal     Platelet Estimate Adequate    CBC Auto Differential [[id number]]  (Abnormal) Collected: 12/22/22 0652    Specimen: Blood Updated: 12/22/22 0816     WBC 13.31 10*3/mm3      RBC 4.56 10*6/mm3      Hemoglobin 12.4 g/dL      Hematocrit 38.7 %      MCV 84.9 fL      MCH 27.2 pg      MCHC 32.0 g/dL      RDW 16.3 %      RDW-SD 50.0 fl      MPV 9.0 fL      Platelets 241 10*3/mm3     Comprehensive Metabolic Panel [[id number]]  (Abnormal) Collected: 12/22/22 0652    Specimen: Blood Updated: 12/22/22 0739     Glucose 354 mg/dL      BUN 54 mg/dL      Creatinine 4.88 mg/dL      Sodium 128 mmol/L      Potassium 4.0 mmol/L      Chloride 86 mmol/L      CO2 21.0 mmol/L      Calcium 9.5 mg/dL      Total Protein 8.4 g/dL      Albumin 3.60 g/dL      ALT (SGPT) <5 U/L      AST (SGOT) 12 U/L      Alkaline Phosphatase 198 U/L      Total Bilirubin 0.3 mg/dL      Globulin 4.8 gm/dL      A/G Ratio 0.8 g/dL      BUN/Creatinine Ratio 11.1     Anion Gap 21.0 mmol/L      eGFR 9.5 mL/min/1.73      Comment: <15 Indicative  of kidney failure       Narrative:      GFR Normal >60  Chronic Kidney Disease <60  Kidney Failure <15      C-reactive Protein [[id number]]  (Abnormal) Collected: 12/22/22 0652    Specimen: Blood Updated: 12/22/22 0739     C-Reactive Protein 6.95 mg/dL     Blood Culture - Blood, Arm, Left [[id number]]  (Normal) Collected: 12/15/22 1818    Specimen: Blood from Arm, Left Updated: 12/20/22 1930     Blood Culture No growth at 5 days    Blood Culture - Blood, Arm, Left [[id number]]  (Normal) Collected: 12/15/22 1818    Specimen: Blood from Arm, Left Updated: 12/20/22 1930     Blood Culture No growth at 5 days    Vancomycin, Random [[id number]]  (Normal) Collected: 12/19/22 0600    Specimen: Blood Updated: 12/19/22 0643     Vancomycin Random 19.70 mcg/mL     Extra Tubes [[id number]] Collected: 12/18/22 0831    Specimen: Blood, Venous Line Updated: 12/18/22 0945    Narrative:      The following orders were created for panel order Extra Tubes.  Procedure                               Abnormality         Status                     ---------                               -----------         ------                     Green Top (Gel)[[id number]]                                  Final result                 Please view results for these tests on the individual orders.    Green Top (Gel) [[id number]] Collected: 12/18/22 0831    Specimen: Blood Updated: 12/18/22 0945     Extra Tube Hold for add-ons.     Comment: Auto resulted.       CBC (No Diff) [[id number]]  (Abnormal) Collected: 12/18/22 0825    Specimen: Blood Updated: 12/18/22 0834     WBC 10.68 10*3/mm3      RBC 4.76 10*6/mm3      Hemoglobin 12.7 g/dL      Hematocrit 41.4 %      MCV 87.0 fL      MCH 26.7 pg      MCHC 30.7 g/dL      RDW 16.1 %      RDW-SD 51.2 fl      MPV 8.1 fL      Platelets 253 10*3/mm3     Vancomycin, Random [[id number]]  (Normal) Collected: 12/18/22 0549    Specimen: Blood Updated: 12/18/22 0639     Vancomycin Random 16.20 mcg/mL     Urine Culture - Urine,  Straight Cath [[id number]]  (Normal) Collected: 12/16/22 1240    Specimen: Urine from Straight Cath Updated: 12/17/22 0841     Urine Culture No growth    Extra Tubes [[id number]] Collected: 12/16/22 1411    Specimen: Blood, Venous Line Updated: 12/16/22 1516    Narrative:      The following orders were created for panel order Extra Tubes.  Procedure                               Abnormality         Status                     ---------                               -----------         ------                     Lavender Top[[id number]]                                     Final result                 Please view results for these tests on the individual orders.    Lavender Top [[id number]] Collected: 12/16/22 1411    Specimen: Blood Updated: 12/16/22 1516     Extra Tube hold for add-on     Comment: Auto resulted       Potassium [[id number]]  (Normal) Collected: 12/16/22 1409    Specimen: Blood from Arm, Left Updated: 12/16/22 1425     Potassium 5.0 mmol/L     Urinalysis, Microscopic Only - Straight Cath [[id number]]  (Abnormal) Collected: 12/16/22 1240    Specimen: Urine from Straight Cath Updated: 12/16/22 1259     RBC, UA 0-2 /HPF      WBC, UA Too Numerous to Count /HPF      Bacteria, UA None Seen /HPF      Squamous Epithelial Cells, UA 0-2 /HPF      Hyaline Casts, UA 0-2 /LPF      Methodology Automated Microscopy    Urinalysis With Culture If Indicated - Straight Cath [[id number]]  (Abnormal) Collected: 12/16/22 1240    Specimen: Urine from Straight Cath Updated: 12/16/22 1256     Color, UA Yellow     Appearance, UA Cloudy     pH, UA 8.0     Specific Gravity, UA 1.009     Glucose, UA Negative     Ketones, UA Negative     Bilirubin, UA Negative     Blood, UA Small (1+)     Protein,  mg/dL (2+)     Leuk Esterase, UA Large (3+)     Nitrite, UA Negative     Urobilinogen, UA 0.2 E.U./dL    Narrative:      In absence of clinical symptoms, the presence of pyuria, bacteria, and/or nitrites on the urinalysis result does not  correlate with infection.    Phosphorus [[id number]]  (Abnormal) Collected: 12/16/22 0259    Specimen: Blood Updated: 12/16/22 0404     Phosphorus 8.9 mg/dL     Magnesium [[id number]]  (Normal) Collected: 12/16/22 0259    Specimen: Blood Updated: 12/16/22 0404     Magnesium 1.9 mg/dL     Vitamin D,25-Hydroxy [[id number]]  (Normal) Collected: 12/15/22 1658    Specimen: Blood Updated: 12/16/22 0209     25 Hydroxy, Vitamin D 37.6 ng/ml     Narrative:      Reference Range for Total Vitamin D 25(OH)     Deficiency <20.0 ng/mL   Insufficiency 21-29 ng/mL   Sufficiency  ng/mL  Toxicity >100 ng/ml    Results may be falsely increased if patient taking Biotin.      Basic Metabolic Panel [[id number]]  (Abnormal) Collected: 12/15/22 2233    Specimen: Blood Updated: 12/15/22 2304     Glucose 121 mg/dL      BUN 75 mg/dL      Creatinine 5.74 mg/dL      Sodium 135 mmol/L      Potassium 8.3 mmol/L      Chloride 99 mmol/L      CO2 22.0 mmol/L      Calcium 9.4 mg/dL      BUN/Creatinine Ratio 13.1     Anion Gap 14.0 mmol/L      eGFR 7.8 mL/min/1.73      Comment: <15 Indicative of kidney failure       Narrative:      GFR Normal >60  Chronic Kidney Disease <60  Kidney Failure <15      Light Blue Top [[id number]] Collected: 12/15/22 1923    Specimen: Blood Updated: 12/15/22 2032     Extra Tube Hold for add-ons.     Comment: Auto resulted       Basic Metabolic Panel [[id number]]  (Abnormal) Collected: 12/15/22 1658    Specimen: Blood Updated: 12/15/22 1832     Glucose 67 mg/dL      BUN 75 mg/dL      Creatinine 6.50 mg/dL      Sodium 138 mmol/L      Potassium 7.3 mmol/L      Comment: Slight hemolysis detected by analyzer. Results may be affected.        Chloride 100 mmol/L      CO2 18.0 mmol/L      Calcium 9.3 mg/dL      BUN/Creatinine Ratio 11.5     Anion Gap 20.0 mmol/L      eGFR 6.7 mL/min/1.73      Comment: <15 Indicative of kidney failure       Narrative:      GFR Normal >60  Chronic Kidney Disease <60  Kidney Failure <15      TSH  [[id number]]  (Abnormal) Collected: 12/15/22 1658    Specimen: Blood Updated: 12/15/22 1815     TSH 8.680 uIU/mL     Lipid Panel [[id number]] Collected: 12/15/22 1658    Specimen: Blood Updated: 12/15/22 1809     Total Cholesterol 135 mg/dL      Triglycerides 88 mg/dL      HDL Cholesterol 53 mg/dL      LDL Cholesterol  65 mg/dL      VLDL Cholesterol 17 mg/dL      LDL/HDL Ratio 1.22    Narrative:      Cholesterol Reference Ranges  (U.S. Department of Health and Human Services ATP III Classifications)    Desirable          <200 mg/dL  Borderline High    200-239 mg/dL  High Risk          >240 mg/dL      Triglyceride Reference Ranges  (U.S. Department of Health and Human Services ATP III Classifications)    Normal           <150 mg/dL  Borderline High  150-199 mg/dL  High             200-499 mg/dL  Very High        >500 mg/dL    HDL Reference Ranges  (U.S. Department of Health and Human Services ATP III Classifications)    Low     <40 mg/dl (major risk factor for CHD)  High    >60 mg/dl ('negative' risk factor for CHD)        LDL Reference Ranges  (U.S. Department of Health and Human Services ATP III Classifications)    Optimal          <100 mg/dL  Near Optimal     100-129 mg/dL  Borderline High  130-159 mg/dL  High             160-189 mg/dL  Very High        >189 mg/dL    Hepatitis B Surface Antigen [[id number]]  (Normal) Collected: 12/15/22 1658    Specimen: Blood Updated: 12/15/22 1757     Hepatitis B Surface Ag Non-Reactive    Hemoglobin A1c [[id number]]  (Normal) Collected: 12/15/22 1657    Specimen: Blood Updated: 12/15/22 1733     Hemoglobin A1C 5.30 %     Narrative:      Hemoglobin A1C Ranges:    Increased Risk for Diabetes  5.7% to 6.4%  Diabetes                     >= 6.5%  Diabetic Goal                < 7.0%    Urine Drug Screen - Urine, Clean Catch [[id number]]  (Abnormal) Collected: 12/15/22 1721    Specimen: Urine, Clean Catch Updated: 12/15/22 1733     THC, Screen, Urine Negative     Phencyclidine (PCP), Urine  Negative     Cocaine Screen, Urine Negative     Methamphetamine, Ur Negative     Opiate Screen Negative     Amphetamine Screen, Urine Negative     Benzodiazepine Screen, Urine Negative     Tricyclic Antidepressants Screen Negative     Methadone Screen, Urine Negative     Barbiturates Screen, Urine Negative     Oxycodone Screen, Urine Positive     Propoxyphene Screen Negative     Buprenorphine, Screen, Urine Negative    Narrative:      Cutoff For Drugs Screened:    Amphetamines               500 ng/ml  Barbiturates               200 ng/ml  Benzodiazepines            150 ng/ml  Cocaine                    150 ng/ml  Methadone                  200 ng/ml  Opiates                    100 ng/ml  Phencyclidine               25 ng/ml  THC                            50 ng/ml  Methamphetamine            500 ng/ml  Tricyclic Antidepressants  300 ng/ml  Oxycodone                  100 ng/ml  Propoxyphene               300 ng/ml  Buprenorphine               10 ng/ml    The normal value for all drugs tested is negative. This report includes unconfirmed screening results, with the cutoff values listed, to be used for medical treatment purposes only.  Unconfirmed results must not be used for non-medical purposes such as employment or legal testing.  Clinical consideration should be applied to any drug of abuse test, particularly when unconfirmed results are used.          Imaging Results (Most Recent)     Procedure Component Value Units Date/Time    XR Chest 1 View [[id number]] Collected: 12/16/22 1804     Updated: 12/16/22 1856    Narrative:      PROCEDURE: XR CHEST 1 VIEW, 12/16/2022 6:04 PM CST    CLINICAL INDICATION:    Post-Op, I87.2 Venous insufficiency  (chronic) (peripheral) I27.20 Pulmonary hypertension, unspecified  M79.672 Pain in left foot I73.9 Peripheral vascular disease,  unspecified Z99.81 Dependence on supplemental oxygen N18.6 End  stage renal disease Z99.2 Dependence on renal dialysis I65.23  Occlusion and stenosis  of bilateral carotid arteries E66.01  Morbid (severe) obesity due to excess calories Z68.4.    COMPARISON: 7/1/2022    TECHNIQUE:  AP portable radiograph of chest     FINDINGS:    Unchanged left neck catheter and right neck catheter. Interval  removal of enteric tube.    Cardiac silhouette is stable. Moderate interstitial prominence  and hazy retrocardiac opacification. No pneumothorax.      Impression:        Moderate interstitial prominence may reflect interstitial edema  or infectious processes.    Hazy retrocardiac opacification may reflect edema, infiltrate, or  atelectasis.        Electronically signed by:  Jm Mckeon MD  12/16/2022 6:54 PM  CST Workstation: [id number]          Chief Complaint on Day of Discharge: Improving confusion, left stump pain    Hospital Course:  The patient is a 62 y.o. female who presented to [facility] with left foot pain secondary to necrosis.  Patient was started on IV antibiotics and orthopedics was consulted.  Plan was made for below the knee amputation.  Patient also has end-stage renal disease and nephrology was consulted for dialysis.  Patient was kept on IV antibiotics postoperatively.  She had significant confusion postoperatively that improved over the remainder of her hospital stay.  Patient's sister states this is pretty common for her.  As she returned toward baseline attention was turned to disposition plan.  Disposition was complicated by the need for significant rehab given her new left BKA coupled with her need for hemodialysis.  Patient was accepted to skilled facility for rehab.  She was cleared for discharge by orthopedic surgery and was discharged in hemodynamically stable condition.    Condition on Discharge: Hemodynamically stable    Physical Exam on Discharge:  /66   Pulse 80   Temp 98.2 °F (36.8 °C) (Tympanic)   Resp 18   Ht 157.5 cm (62.01\")   Wt 112 kg (247 lb)   LMP  (LMP Unknown)   SpO2 97%   BMI 45.17 kg/m²      Physical  Exam  Vitals reviewed.   Constitutional:       Appearance: She is well-developed. She is morbidly obese.      Comments: BMI 45.17   HENT:      Head: Normocephalic and atraumatic.   Eyes:      Pupils: Pupils are equal, round, and reactive to light.   Cardiovascular:      Rate and Rhythm: Normal rate and regular rhythm.      Heart sounds: Normal heart sounds. No murmur heard.    No friction rub. No gallop.   Pulmonary:      Effort: Pulmonary effort is normal. No respiratory distress.      Breath sounds: Normal breath sounds. No wheezing or rales.   Chest:      Chest wall: No tenderness.   Abdominal:      General: Bowel sounds are normal. There is no distension.      Palpations: Abdomen is soft.      Tenderness: There is no abdominal tenderness. There is no guarding.   Musculoskeletal:      Cervical back: Normal range of motion and neck supple.      Comments: Left BKA   Skin:     General: Skin is warm and dry.   Neurological:      Comments: Intermittent confusion   Psychiatric:         Behavior: Behavior normal.         Thought Content: Thought content normal.           Discharge Disposition:  Skilled Nursing Facility (DC - External)    Discharge Medications:     Discharge Medications      New Medications      Instructions Start Date   calcium carbonate 500 MG chewable tablet  Commonly known as: TUMS   2 tablets, Oral, 2 Times Daily PRN      carvedilol 6.25 MG tablet  Commonly known as: COREG   6.25 mg, Oral, 2 Times Daily With Meals      furosemide 80 MG tablet  Commonly known as: Lasix   80 mg, Oral, 4 Times Weekly, Take on Monday, Wednesday, Friday, and Sunday   Start Date: December 24, 2022     gabapentin 300 MG capsule  Commonly known as: NEURONTIN   300 mg, Oral, Daily      insulin detemir 100 UNIT/ML injection  Commonly known as: LEVEMIR   20 Units, Subcutaneous, Every 12 Hours Scheduled      ondansetron 4 MG tablet  Commonly known as: ZOFRAN   4 mg, Oral, Every 6 Hours PRN      temazepam 15 MG capsule  Commonly  known as: RESTORIL   15 mg, Oral, Nightly PRN         Changes to Medications      Instructions Start Date   Insulin Lispro (1 Unit Dial) 100 UNIT/ML solution pen-injector  Commonly known as: HUMALOG  What changed:   · how much to take  · additional instructions  · Another medication with the same name was removed. Continue taking this medication, and follow the directions you see here.   14-35 Units, Subcutaneous, 3 Times Daily With Meals, Takes 14 units with meals plus sliding scale Sliding scale:  150-199 take 4 units 200-249 take 8 units 250-299 take 12 units 300-349 take 16 units 350-400 take 20 units greater than 400, call physician      losartan 25 MG tablet  Commonly known as: COZAAR  What changed:   · medication strength  · how much to take   25 mg, Oral, Daily   Start Date: December 24, 2022        Continue These Medications      Instructions Start Date   acetaminophen 650 MG 8 hr tablet  Commonly known as: Tylenol 8 Hour   650 mg, Oral, Every 8 Hours PRN      albuterol sulfate  (90 Base) MCG/ACT inhaler  Commonly known as: PROVENTIL HFA;VENTOLIN HFA;PROAIR HFA   2 puffs, Inhalation, Every 4 Hours PRN      albuterol (2.5 MG/3ML) 0.083% nebulizer solution  Commonly known as: PROVENTIL   Inhale the contents of 1 vial by nebulization Every 4 (Four) Hours As Needed for Wheezing.      atorvastatin 20 MG tablet  Commonly known as: LIPITOR   20 mg, Oral, Every Night at Bedtime      Capsaicin 0.035 % cream   3 g, Apply externally, 3 Times Daily PRN      Cetirizine HCl 10 MG capsule   1 capsule, Oral, Daily      clopidogrel 75 MG tablet  Commonly known as: PLAVIX   75 mg, Oral, Daily      [employer] Blood Glucose Meter w/Device kit   1 each, Does not apply, 3 Times Daily      CYCLOBENZAPRINE HCL PO   5 mg, Oral, 2 Times Daily      Diclofenac Sodium 1 % gel gel  Commonly known as: VOLTAREN   4 g, Topical, 4 Times Daily PRN      docusate sodium 100 MG capsule  Commonly known as: COLACE   100 mg, Oral, 2 Times Daily       DULoxetine 20 MG capsule  Commonly known as: CYMBALTA   20 mg, Oral, Daily      Easy Touch Insulin Syringe 30G X 5/16\" 0.5 ML misc  Generic drug: Insulin Syringe-Needle U-100   USE AS DIRECTED WITH LEVEMIR      Easy Touch Pen Needles 31G X 8 MM misc  Generic drug: Insulin Pen Needle   No dose, route, or frequency recorded.      NovoFine 30G X 8 MM misc  Generic drug: Insulin Pen Needle   As directed 4 times daily      B-D ULTRAFINE III SHORT PEN 31G X 8 MM misc  Generic drug: Insulin Pen Needle   Use to inject insulin 4 (Four) Times a Day as directed.      hydrOXYzine 25 MG tablet  Commonly known as: ATARAX   25 mg, Oral, Every 8 Hours PRN      ipratropium-albuterol 0.5-2.5 mg/3 ml nebulizer  Commonly known as: DUO-NEB   3 mL, Nebulization, 4 Times Daily PRN      memantine 5 MG tablet  Commonly known as: NAMENDA   5 mg, Oral, 2 Times Daily      MIRCERA IJ   225 mcg, Every 14 Days      montelukast 10 MG tablet  Commonly known as: SINGULAIR   10 mg, Oral, Nightly      nitroglycerin 0.4 MG SL tablet  Commonly known as: NITROSTAT   0.4 mg, Sublingual, Every 5 Minutes PRN      O2  Commonly known as: OXYGEN   2 L/min, Inhalation, Continuous      oxyCODONE 10 MG tablet  Commonly known as: ROXICODONE   10 mg, Oral, 4 Times Daily PRN      pantoprazole 40 MG EC tablet  Commonly known as: PROTONIX   40 mg, Oral, Nightly      promethazine 25 MG tablet  Commonly known as: PHENERGAN   25 mg, Oral, Every 6 Hours PRN      sevelamer 800 MG tablet  Commonly known as: RENVELA   800 mg, Oral, 3 Times Daily With Meals      Vitamin D (Ergocalciferol) 63324 units capsule   50,000 Units, Oral, Weekly, Take on Wednesday         Stop These Medications    glucose blood test strip     ondansetron ODT 4 MG disintegrating tablet  Commonly known as: Zofran ODT            Discharge Diet:   Diet Instructions     Diet: Renal, Consistent Carbohydrate      Discharge Diet:  Renal  Consistent Carbohydrate             Activity at Discharge:   Activity  Instructions     Other Activity Instructions      Activity Instructions: PT/OT          Follow-up Appointments:   PCP in 1 to 2 weeks  Orthopedics in 1 week.          This document has been electronically signed by Jef Ramires MD on December 23, 2022 16:28 CST      Time: 35 minutes

## 2022-12-23 NOTE — DISCHARGE INSTR - APPOINTMENTS
[facility] Please call Monday for follow-up. For 21 days post-op

## 2022-12-23 NOTE — DISCHARGE INSTRUCTIONS
Dressing change per nursing 1x per day:   telfa (dry non-adherent) on incision.     1 package of fluff's (opened up to long rectangle) vertically on medial aspect incision.   1 package of fluff's (opened up to long rectangle) vertically on lateral aspect incision.   1 loosely applied kerlex wrapped in figure of 8   2 6\" ace bandages wrapped in figure of 8 with very mild compression.    S/p left BKA  Slowly progress as tolerated  Up with PT/OT  Continue working on left leg extension  Remove staples 3 weeks from post-op date

## 2022-12-23 NOTE — DISCHARGE PLACEMENT REQUEST
Yamileth Slater (62 y.o. Female)     Date of Birth   1960    Social Security Number       Address   [address] [place] [address]    Home Phone   [phone]    MRN   [id number]       [employer]   None    Marital Status   Legally                             Admission Date   12/15/22    Admission Type   Urgent    Admitting Provider   Nestor Richards MD    Attending Provider   Nestor Richards MD    Department, Room/Bed   [address], [facility]/1       Discharge Date       Discharge Disposition   Skilled Nursing Facility (DC - External)    Discharge Destination                               Attending Provider: Nestor Richards MD    Allergies: Adhesive Tape, Nsaids, Latex, Other, Wound Dressing Adhesive    Isolation: Contact   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), 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)   Code Status: CPR    Ht: 157.5 cm (62.01\")   Wt: 112 kg (247 lb)    Admission Cmt: None   Principal Problem: Venous insufficiency [I87.2]                 Active Insurance as of 12/15/2022     Primary Coverage     Payor Plan Insurance Group Employer/Plan Group    ANTHEM MEDICARE REPLACEMENT ANTHEM MEDICARE ADVANTAGE [id number]     Payor Plan Address Payor Plan Phone Number Payor Plan Fax Number Effective Dates    [address] [phone]  1/1/2022 - None Entered    [facility] [id number]       Subscriber Name Subscriber Birth Date Member ID       YAMILETH SLATER 1960 [id number]           Secondary Coverage     Payor Plan Insurance Group Employer/Plan Group    [place]      Payor Plan Address Payor Plan Phone Number Payor Plan Fax Number Effective Dates    [address] [phone]  6/28/2019 - None Entered    [facility] [id number]       Subscriber Name Subscriber Birth Date Member ID       YAMILETH SLATER 1960 [id number]                 Emergency Contacts      (Rel.) Home Phone Work Phone Mobile Phone    RAMONA KHAN  (Sister) -- -- [phone]    Yamileth Chavez (Daughter) [phone] -- [phone]    Suzanne Rivera (Daughter) [phone] -- [phone]

## 2022-12-23 NOTE — PROGRESS NOTES
[facility]  [address] [place] [address]. [address]  T - [phone]  F - [phone]     Progress Note          PATIENT  DEMOGRAPHICS   PATIENT NAME: Yamileth Slater                      PHYSICIAN: Ace Lauren MD  : 1960  MRN: [id number]   LOS: 8 days    Patient Care Team:  Kiersten Wilson APRN as PCP - General (Family Medicine)  Subjective   SUBJECTIVE   C/o post surgical pain. Sister at bedside. Feels better than yesterday post dialysis          Objective   OBJECTIVE   Vital Signs  Temp:  [97 °F (36.1 °C)-98.2 °F (36.8 °C)] 98.2 °F (36.8 °C)  Heart Rate:  [74-89] 80  Resp:  [18-20] 20  BP: (117-168)/(58-89) 128/71    Flowsheet Rows    Flowsheet Row First Filed Value   Admission Height 157.5 cm (62\") Documented at 12/15/2022 1700   Admission Weight 124 kg (273 lb 4.8 oz) Documented at 12/15/2022 1700           I/O last 3 completed shifts:  In: 480 [P.O.:480]  Out: -     PHYSICAL EXAM    Physical Exam  Vitals reviewed.   Constitutional:       Appearance: Normal appearance.   HENT:      Head: Normocephalic and atraumatic.      Right Ear: Tympanic membrane normal.      Left Ear: Tympanic membrane normal.      Nose: Nose normal.      Mouth/Throat:      Mouth: Mucous membranes are moist.   Eyes:      Pupils: Pupils are equal, round, and reactive to light.   Cardiovascular:      Rate and Rhythm: Normal rate.      Pulses: Normal pulses.      Heart sounds: Normal heart sounds.   Pulmonary:      Effort: Pulmonary effort is normal.      Breath sounds: Normal breath sounds.   Abdominal:      General: Abdomen is flat. Bowel sounds are normal.      Palpations: Abdomen is soft.   Musculoskeletal:         General: Normal range of motion.      Cervical back: Normal range of motion and neck supple.      Comments: Left BKA   Skin:     General: Skin is warm and dry.      Capillary Refill: Capillary refill takes less than 2 seconds.   Neurological:      General: No focal deficit present.       Mental Status: She is alert.   Psychiatric:         Mood and Affect: Mood normal.         RESULTS   Results Review:    Results from last 7 days   Lab Units 12/23/22  0544 12/22/22  0652 12/21/22  0641   SODIUM mmol/L 127* 128* 132*   POTASSIUM mmol/L 4.4 4.0 4.5   CHLORIDE mmol/L 86* 86* 91*   CO2 mmol/L 23.0 21.0* 21.0*   BUN mg/dL 71* 54* 68*   CREATININE mg/dL 5.87* 4.88* 6.07*   CALCIUM mg/dL 9.5 9.5 9.8   BILIRUBIN mg/dL 0.3 0.3 0.3   ALK PHOS U/L 172* 198* 203*   ALT (SGPT) U/L <5 <5 <5   AST (SGOT) U/L 13 12 9   GLUCOSE mg/dL 229* 354* 270*       Estimated Creatinine Clearance: 11.6 mL/min (A) (by C-G formula based on SCr of 5.87 mg/dL (H)).                Results from last 7 days   Lab Units 12/23/22  0544 12/22/22  0652 12/21/22  0641 12/20/22  0522 12/19/22  0600   WBC 10*3/mm3 12.91* 13.31* 12.79* 13.52* 9.77   HEMOGLOBIN g/dL 11.8* 12.4 12.5 13.1 12.3   PLATELETS 10*3/mm3 205 241 251 281 259               Imaging Results (Last 24 Hours)     ** No results found for the last 24 hours. **           MEDICATIONS    atorvastatin, 20 mg, Oral, Nightly  carvedilol, 6.25 mg, Oral, BID With Meals  docusate sodium, 200 mg, Oral, BID  DULoxetine, 20 mg, Oral, Daily  furosemide, 80 mg, Oral, Once per day on Sun Tue Thu Sat  gabapentin, 300 mg, Oral, Once per day on Mon Wed Fri   And  gabapentin, 300 mg, Oral, 2 times per day on Sun Tue Thu Sat  heparin (porcine), 5,000 Units, Subcutaneous, Q8H  Insulin Aspart, 0-9 Units, Subcutaneous, TID AC  insulin detemir, 20 Units, Subcutaneous, Q12H  losartan, 25 mg, Oral, Daily  memantine, 5 mg, Oral, BID  pantoprazole, 40 mg, Oral, Nightly  sevelamer, 800 mg, Oral, TID With Meals  sodium chloride, 10 mL, Intravenous, Q12H      O2, 2 L/min  sodium chloride, 1,000 mL        Assessment & Plan   ASSESSMENT / PLAN      Venous insufficiency    Morbid obesity with BMI of 50.0-59.9, adult (HCC)    Coronary artery disease involving native coronary artery of native heart without angina  pectoris    Carotid artery disease (HCC)    PAD (peripheral artery disease) (HCC)    Pulmonary hypertension (HCC)    MIKE and COPD overlap syndrome (HCC)    ESRD on hemodialysis (HCC)    Left foot pain    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  She is currently dialyzing at [facility]. her current access is tunneled catheter.  She has prior right upper extremity failed AV fistula. She has recurrent hyperkalemia and had hd Friday and Saturday on admission. k remains stable now. HD tomorrow.     Losartan lowered due to high k     keep diuretic non dialysis days (makes reasonable urine and for kaliuresis / volume overload issues)     2- anemia chronic kidney disease patient has history of B12 deficiency and AV malformation.  hemoglobin is more than 10.  Epogen has been discontinued      3.diabetes mellitus type 2 with nonhealing wound on the left foot and now  left BKA     4. CAD     5. ckd mbd on sevelamer in out patient setting     6. htn stable on coreg and losartan                 This document has been electronically signed by Ace Lauren MD on December 23, 2022 09:33 CST

## 2022-12-23 NOTE — PROGRESS NOTES
Physicians Statement of Medical Necessity for  Ambulance Transportation    GENERAL INFORMATION     Name: Yamileth Slater  YOB: 1960  Medicare #: [id number]  Transport Date: 12/23/2022 (Valid for round trips this date, or for scheduled repetitive trips for 60 days from the date signed below.)  Origin: [facility] Room 412 Destination:[address] [place] [address]   Is the Patient's stay covered under Medicare Part A (PPS/DRG?)Yes  Closest appropriate facility? Yes  If this a hosp-hosp transfer? No  Is this a hospice patient? No    MEDICAL NECESSITY QUESTIONAIRE    Ambulance Transportation is medically necessary only if other means of transportation are contraindicated or would be potentially harmful to the patient.  To meet this requirement, the patient must be either \"bed confined\" or suffer from a condition such that transport by means other than an ambulance is contraindicated by the patient's condition.  The following questions must be answered by the healthcare professional signing below for this form to be valid:     1) Describe the MEDICAL CONDITION (physical and/or mental) of this patient AT THE TIME OF AMBULANCE TRANSPORT that requires the patient to be transported in an ambulance, and why transport by other means is contraindicated by the patient's condition:     Patient is morbidly obese (247 lbs.). Patient is a below the knee amputee. Patient receives dialysis and has received her treatment today causing some confusion and drowsiness. Patient is on oxygen at 2L. Patient can't self administer and will require a medical attendant. Patient in in MRSA precautions. Due to inclement weather patient has no other transportation options. Patient is a moderate assist of 2 and is unable to ambulate. Pt can only stand for short periods of time.    2) Is this patient \"bed confined\" as defined below?Yes   To be \"bed confined\" the patient must satisfy all three of the  following criteria:  (1) unable to get up from bed without assistance; AND (2) unable to ambulate;  AND (3) unable to sit in a chair or wheelchair.  3) Can this patient safely be transported by car or wheelchair van (I.e., may safely sit during transport, without an attendant or monitoring?)No   4. In addition to completing questions 1-3 above, please check any of the following conditions that apply*:          *Note: supporting documentation for any boxes checked must be maintained in the patient's medical records Patient is confused, Medical attendant required, Requires oxygen - unable to self administer, Special handling/isolation/infection control precautions required and Morbid obesity requires additional personnel/equipment to safely handle patient      SIGNATURE OF PHYSICIAN OR OTHER AUTHORIZED HEALTHCARE PROFESSIONAL    I certify that the above information is true and correct based on my evaluation of this patient, and represent that the patient requires transport by ambulance and that other forms of transport are contraindicated.  I understand that this information will be used by the Centers for Medicare and Medicaid Services (CMS) to support the determiniation of medical necessity for ambulance services, and I represent that I have personal knowledge of the patient's condition at the time of transport.       If this box is checked, I also certify that the patient is physically or mentally incapable of signing the ambulance service's claim form and that the institution with which I am affiliated has furnished care, services or assistance to the patient.  My signature below is made on behalf of the patient pursuant to 42 .36(b)(4). In accordance with 42 .37, the specific reason(s) that the patient is physically or mentally incapable of signing the claim for is as follows:     Signature of Physician or Healthcare Professional   MAIA Oleary   Date/Time:   12/23/22  13:55 CST       (For  Scheduled repetitive transport, this form is not valid for transports performed more than 60 days after this date).                                                                                                                                            --------------------------------------------------------------------------------------------  Printed Name and Credentials of Physician or Authorized Healthcare Professional     *Form must be signed by patient's attending physician for scheduled, repetitive transports,.  For non-repetitive ambulance transports, if unable to obtain the signature of the attending physician, any of the following may sign (please select below):     Physician  Clinical Nurse Specialist  Registered Nurse     Physician Assistant x Discharge Planner  Licensed Practical Nurse     Nurse Practitioner x

## 2022-12-27 LAB — GLUCOSE BLDC GLUCOMTR-MCNC: 416 MG/DL (ref 70–130)

## 2022-12-27 NOTE — PROGRESS NOTES
Enter Query Response Below      Query Response:   Chronic CHF with preserved EF      This document has been electronically signed by Jef Ramires MD on 2022 17:19 CST               If applicable, please update the problem list.             Patient: Yamileth Slater        : 1960  Account: [id number]           Admit Date:         How to Respond to this query:       a. Click New Note     b. Answer query within the yellow box.                c. Update the Problem List, if applicable.      If you have any questions about this query contact me at:     [email or web address]    By submitting this query, we are merely seeking further clarification of the documentation to accurately reflect all conditions that you are monitoring, evaluating, treating or that extends the hospitalization. Please utilize your independent clinical judgment when addressing the question(s) below.    63 y/o female admitted with gangrene of left foot requiring below the knee amputation. Noted to have a history of CHF. EF 61-65% per ECHO on 1/10/22. Coreg and Cozaar on medication list.     After further study, can the CHF be specified as:    Chronic CHF with preserved EF  Compensated CHF with preserved EF  Other (please specify)_______________________  Clinically unable to Determine      Sandra Gastelum  Clinical Documentation Integrity  [email or web address]      This query and your response, once completed, will be entered into the legal medical record.

## 2022-12-27 NOTE — PAYOR COMM NOTE
Komaljefry Rodarte  [facility]  Case Management Extender  [phone] phone  [phone] fax      Ref# [id number]    Yamileth Slater (62 y.o. Female)     Date of Birth   1960    Social Security Number       Address   [address] [place] [address]    Home Phone   [phone]    MRN   [id number]       Holiness   None    Marital Status   Legally                             Admission Date   12/15/22    Admission Type   Urgent    Admitting Provider   Nestor Richards MD    Attending Provider       Department, Room/Bed   [address], 412/1       Discharge Date   12/23/2022    Discharge Disposition   Skilled Nursing Facility (DC - External)    Discharge Destination                               Attending Provider: (none)   Allergies: Adhesive Tape, Nsaids, Latex, Other, Wound Dressing Adhesive    Isolation: None   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), 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)   Code Status: Prior    Ht: 157.5 cm (62.01\")   Wt: 112 kg (247 lb)    Admission Cmt: None   Principal Problem: Venous insufficiency [I87.2]                 Active Insurance as of 12/15/2022     Primary Coverage     Payor Plan Insurance Group Employer/Plan Group    ANTHEM MEDICARE REPLACEMENT ANTHEM MEDICARE ADVANTAGE [id number]     Payor Plan Address Payor Plan Phone Number Payor Plan Fax Number Effective Dates    [address] [phone]  1/1/2022 - None Entered    [facility] [id number]       Subscriber Name Subscriber Birth Date Member ID       YAMILETH SLATER 1960 [id number]           Secondary Coverage     Payor Plan Insurance Group Employer/Plan Group    [place]      Payor Plan Address Payor Plan Phone Number Payor Plan Fax Number Effective Dates    [address] [phone]  6/28/2019 - None Entered    [facility] [id number]       Subscriber Name Subscriber Birth Date Member ID       YAMILETH SLATER 1960  [id number]                 Emergency Contacts      (Rel.) Home Phone Work Phone Mobile Phone    RAMONA KHAN (Sister) -- -- [phone]    Yamileth Chavez (Daughter) [phone] -- [phone]    Suzanne Rivera (Daughter) [phone] -- [phone]               Discharge Summary      Jef Ramires MD at 12/23/22 3457              [facility] Medicine Services  DISCHARGE SUMMARY       Date of Admission: 12/15/2022  Date of Discharge:  12/23/2022  Primary Care Physician: Kiersten Wilson APRN    Presenting Problem/History of Present Illness:  Venous insufficiency [I87.2]       Final Discharge Diagnoses:  Active Hospital Problems    Diagnosis    • **Venous insufficiency    • Left foot pain    • 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 (HCC)    • On home oxygen therapy    • ESRD on hemodialysis (HCC)    • MIKE and COPD overlap syndrome (HCC)    • Pulmonary hypertension (HCC)    • Coronary artery disease involving native coronary artery of native heart without angina pectoris    • Morbid obesity with BMI of 50.0-59.9, adult (HCC)    • Carotid artery disease (HCC)    • PAD (peripheral artery disease) ([facility])        Consults:   Consults     Date and Time Order Name Status Description    12/15/2022  5:31 PM Inpatient Orthopedic Surgery Consult      12/15/2022  5:31 PM Inpatient Nephrology Consult Completed           Procedures Performed: Procedure(s):  AMPUTATION BELOW KNEE, LEFT                Pertinent Test Results:   Lab Results (most recent)     Procedure Component Value Units Date/Time    POC Glucose Once [[id number]]  (Abnormal) Collected: 12/23/22 1012    Specimen: Blood Updated: 12/23/22 1058     Glucose 288 mg/dL      Comment: RN NotifiedOperator: [id number] ANGY [facility] ID: [id number]       POC Glucose Once [[id number]]  (Abnormal) Collected: 12/23/22 0711    Specimen: Blood Updated: 12/23/22 0738     Glucose 231 mg/dL       Comment: RN NotifiedOperator: [id number] ANGY Gutierrez ID: [id number]       CBC & Differential [[id number]]  (Abnormal) Collected: 12/23/22 0544    Specimen: Blood Updated: 12/23/22 0725    Narrative:      The following orders were created for panel order CBC & Differential.  Procedure                               Abnormality         Status                     ---------                               -----------         ------                     Manual Differential[[id number]]          Abnormal            Final result               CBC Auto Differential[[id number]]        Abnormal            Final result                 Please view results for these tests on the individual orders.    Manual Differential [[id number]]  (Abnormal) Collected: 12/23/22 0544    Specimen: Blood Updated: 12/23/22 0725     Neutrophil % 61.0 %      Lymphocyte % 29.0 %      Monocyte % 6.0 %      Eosinophil % 2.0 %      Basophil % 1.0 %      Atypical Lymphocyte % 1.0 %      Neutrophils Absolute 7.88 10*3/mm3      Lymphocytes Absolute 3.87 10*3/mm3      Monocytes Absolute 0.77 10*3/mm3      Eosinophils Absolute 0.26 10*3/mm3      Basophils Absolute 0.13 10*3/mm3      RBC Morphology Normal     Toxic Granulation Slight/1+     Vacuolated Neutrophils Slight/1+     Platelet Morphology Normal    Comprehensive Metabolic Panel [[id number]]  (Abnormal) Collected: 12/23/22 0544    Specimen: Blood Updated: 12/23/22 0615     Glucose 229 mg/dL      BUN 71 mg/dL      Creatinine 5.87 mg/dL      Sodium 127 mmol/L      Potassium 4.4 mmol/L      Chloride 86 mmol/L      CO2 23.0 mmol/L      Calcium 9.5 mg/dL      Total Protein 7.9 g/dL      Albumin 3.20 g/dL      ALT (SGPT) <5 U/L      AST (SGOT) 13 U/L      Alkaline Phosphatase 172 U/L      Total Bilirubin 0.3 mg/dL      Globulin 4.7 gm/dL      A/G Ratio 0.7 g/dL      BUN/Creatinine Ratio 12.1     Anion Gap 18.0 mmol/L      eGFR 7.6 mL/min/1.73      Comment: <15 Indicative of kidney failure       Narrative:       GFR Normal >60  Chronic Kidney Disease <60  Kidney Failure <15      C-reactive Protein [[id number]]  (Abnormal) Collected: 22    Specimen: Blood Updated: 22 0615     C-Reactive Protein 8.53 mg/dL     CBC Auto Differential [[id number]]  (Abnormal) Collected: 22    Specimen: Blood Updated: 2259     WBC 12.91 10*3/mm3      RBC 4.28 10*6/mm3      Hemoglobin 11.8 g/dL      Hematocrit 35.9 %      MCV 83.9 fL      MCH 27.6 pg      MCHC 32.9 g/dL      RDW 16.0 %      RDW-SD 48.1 fl      MPV 8.6 fL      Platelets 205 10*3/mm3     TISSUE EXAM, [facility] (LUIS,COR,[facility]AD) [[id number]] Collected: 22 164    Specimen: Tissue from Leg, Left Updated: 22 123     Reference Lab Report --     [facility]  [address] [place] [address]  Phone: [phone] or [phone]  Fax: [phone]  Nirmal Sheth M.D., Medical Director    PATIENT NAME                           LABORATORY NO.  [id number]  [address] FINK.                 [id number]  [id number]                         AGE              SEX  SSN           CLIENT REF #  [facility]           62      1960  F    [id number]   [id number]    [place]                       REQUESTING M.D.     ATTENDING M.D.     COPY TO[address]                 YULIYARANDOLPH ENOCH MUHLETH[address]  DATE COLLECTED      DATE RECEIVED      DATE REPORTED  2022    DIAGNOSIS:  EXTREMITY, AMPUTATION, NON - TRAUMATIC, LEFT LEG, BELOW  KNEE:  Status-post transmetatarsal amputation ([facility], 2022)  Nonhealing wound with gangrenous necrosis  Calcific atherosclerosis    JBS/pah    CLINICAL HISTORY:  Left foot pain, obstructive sleep apnea and chronic obstructive pulmonary  disease overlap syndrome, bilateral carotid artery  stenosis, peripheral artery  disease, venous insufficiency, pulmonary hypertension, coronary artery disease  involving native coronary artery of native heart without angina pectoris, morbid  obesity with BMI of 45.0-49.9, adult, chronic kidney disease requiring chronic  dialysis, chronic ulcer of left foot with necrosis of bone, on home oxygen  therapy    SPECIMENS RECEIVED:  EXTREMITY, AMPUTATION, NON - TRAUMATIC, LEFT LEG, BELOW KNEE    MICROSCOPIC DESCRIPTION:  Tissue blocks are prepared and slides are examined microscopically on all  specimens. See diagnosis for details.    Professional interpretation rendered by Sheng Hanson M.D. at [facility], [address] [place] [address].    GROSS DESCRIPTION:  Received fresh labeled \"left below-knee amputation\" per the requisition, is a  23.5 cm in length leg and a 14.1 cm in length foot.  The specimen is significant  for a 4.8 cm exposed portion of fibula and tibia.  The skin surface is significant  for a 8.1 x 4.9 cm tan-brown, ulcerated lesion located at the distal foot which  may be consistent with the previous amputation site.  This area of ulceration  grossly involves all 5 digits and is located 20.1 cm from the closest skin/soft  tissue margin which is inked blue.  The remaining skin surface is pink-white,  wrinkled and significant for a 0.8 cm in greatest dimension healing wound which  is located 10.3 cm from the previous mentioned nonhealing wound.  Additionally there is a 5.2 x 4.5 cm tattoo located at the soft tissue margin.  No  nails or nail beds are identified.  Dissection of the vasculature reveals grossly  patent and unremarkable lumens.  Representative sections are submitted as  follows:  A1: Bone marrow margin, following decalcification; A2: Vascular  margins, en face; A3: Skin/soft tissue margin; A4: Nonhealing ulcerated wound;  A5: Bone underlying nonhealing, ulcerated wound, following decalcification;  A6: Dissected vasculature;  A7: Healing wound and area of tattoo.  MLA    REVIEWED, DIAGNOSED AND ELECTRONICALLY  SIGNED BY:    Sheng Hanson M.D.  CPT CODES:  21231      CBC & Differential [[id number]]  (Abnormal) Collected: 12/22/22 0652    Specimen: Blood Updated: 12/22/22 0937    Narrative:      The following orders were created for panel order CBC & Differential.  Procedure                               Abnormality         Status                     ---------                               -----------         ------                     Manual Differential[[id number]]          Abnormal            Final result               CBC Auto Differential[[id number]]        Abnormal            Final result                 Please view results for these tests on the individual orders.    Manual Differential [[id number]]  (Abnormal) Collected: 12/22/22 0652    Specimen: Blood Updated: 12/22/22 0937     Neutrophil % 54.0 %      Lymphocyte % 32.0 %      Monocyte % 9.0 %      Eosinophil % 2.0 %      Basophil % 2.0 %      Atypical Lymphocyte % 1.0 %      Neutrophils Absolute 7.19 10*3/mm3      Lymphocytes Absolute 4.39 10*3/mm3      Monocytes Absolute 1.20 10*3/mm3      Eosinophils Absolute 0.27 10*3/mm3      Basophils Absolute 0.27 10*3/mm3      Anisocytosis Slight/1+     WBC Morphology Normal     Platelet Estimate Adequate    CBC Auto Differential [[id number]]  (Abnormal) Collected: 12/22/22 0652    Specimen: Blood Updated: 12/22/22 0816     WBC 13.31 10*3/mm3      RBC 4.56 10*6/mm3      Hemoglobin 12.4 g/dL      Hematocrit 38.7 %      MCV 84.9 fL      MCH 27.2 pg      MCHC 32.0 g/dL      RDW 16.3 %      RDW-SD 50.0 fl      MPV 9.0 fL      Platelets 241 10*3/mm3     Comprehensive Metabolic Panel [[id number]]  (Abnormal) Collected: 12/22/22 0652    Specimen: Blood Updated: 12/22/22 0739     Glucose 354 mg/dL      BUN 54 mg/dL      Creatinine 4.88 mg/dL      Sodium 128 mmol/L      Potassium 4.0 mmol/L      Chloride 86 mmol/L      CO2 21.0 mmol/L      Calcium  9.5 mg/dL      Total Protein 8.4 g/dL      Albumin 3.60 g/dL      ALT (SGPT) <5 U/L      AST (SGOT) 12 U/L      Alkaline Phosphatase 198 U/L      Total Bilirubin 0.3 mg/dL      Globulin 4.8 gm/dL      A/G Ratio 0.8 g/dL      BUN/Creatinine Ratio 11.1     Anion Gap 21.0 mmol/L      eGFR 9.5 mL/min/1.73      Comment: <15 Indicative of kidney failure       Narrative:      GFR Normal >60  Chronic Kidney Disease <60  Kidney Failure <15      C-reactive Protein [[id number]]  (Abnormal) Collected: 12/22/22 0652    Specimen: Blood Updated: 12/22/22 0739     C-Reactive Protein 6.95 mg/dL     Blood Culture - Blood, Arm, Left [[id number]]  (Normal) Collected: 12/15/22 1818    Specimen: Blood from Arm, Left Updated: 12/20/22 1930     Blood Culture No growth at 5 days    Blood Culture - Blood, Arm, Left [[id number]]  (Normal) Collected: 12/15/22 1818    Specimen: Blood from Arm, Left Updated: 12/20/22 1930     Blood Culture No growth at 5 days    Vancomycin, Random [[id number]]  (Normal) Collected: 12/19/22 0600    Specimen: Blood Updated: 12/19/22 0643     Vancomycin Random 19.70 mcg/mL     Extra Tubes [[id number]] Collected: 12/18/22 0831    Specimen: Blood, Venous Line Updated: 12/18/22 0945    Narrative:      The following orders were created for panel order Extra Tubes.  Procedure                               Abnormality         Status                     ---------                               -----------         ------                     Green Top (Gel)[[id number]]                                  Final result                 Please view results for these tests on the individual orders.    Green Top (Gel) [[id number]] Collected: 12/18/22 0831    Specimen: Blood Updated: 12/18/22 0945     Extra Tube Hold for add-ons.     Comment: Auto resulted.       CBC (No Diff) [[id number]]  (Abnormal) Collected: 12/18/22 0825    Specimen: Blood Updated: 12/18/22 0834     WBC 10.68 10*3/mm3      RBC 4.76 10*6/mm3      Hemoglobin 12.7 g/dL       Hematocrit 41.4 %      MCV 87.0 fL      MCH 26.7 pg      MCHC 30.7 g/dL      RDW 16.1 %      RDW-SD 51.2 fl      MPV 8.1 fL      Platelets 253 10*3/mm3     Vancomycin, Random [[id number]]  (Normal) Collected: 12/18/22 0549    Specimen: Blood Updated: 12/18/22 0639     Vancomycin Random 16.20 mcg/mL     Urine Culture - Urine, Straight Cath [[id number]]  (Normal) Collected: 12/16/22 1240    Specimen: Urine from Straight Cath Updated: 12/17/22 0841     Urine Culture No growth    Extra Tubes [[id number]] Collected: 12/16/22 1411    Specimen: Blood, Venous Line Updated: 12/16/22 1516    Narrative:      The following orders were created for panel order Extra Tubes.  Procedure                               Abnormality         Status                     ---------                               -----------         ------                     Lavender Top[[id number]]                                     Final result                 Please view results for these tests on the individual orders.    Lavender Top [[id number]] Collected: 12/16/22 1411    Specimen: Blood Updated: 12/16/22 1516     Extra Tube hold for add-on     Comment: Auto resulted       Potassium [[id number]]  (Normal) Collected: 12/16/22 1409    Specimen: Blood from Arm, Left Updated: 12/16/22 1425     Potassium 5.0 mmol/L     Urinalysis, Microscopic Only - Straight Cath [[id number]]  (Abnormal) Collected: 12/16/22 1240    Specimen: Urine from Straight Cath Updated: 12/16/22 1259     RBC, UA 0-2 /HPF      WBC, UA Too Numerous to Count /HPF      Bacteria, UA None Seen /HPF      Squamous Epithelial Cells, UA 0-2 /HPF      Hyaline Casts, UA 0-2 /LPF      Methodology Automated Microscopy    Urinalysis With Culture If Indicated - Straight Cath [[id number]]  (Abnormal) Collected: 12/16/22 1240    Specimen: Urine from Straight Cath Updated: 12/16/22 1256     Color, UA Yellow     Appearance, UA Cloudy     pH, UA 8.0     Specific Gravity, UA 1.009     Glucose, UA Negative      Ketones, UA Negative     Bilirubin, UA Negative     Blood, UA Small (1+)     Protein,  mg/dL (2+)     Leuk Esterase, UA Large (3+)     Nitrite, UA Negative     Urobilinogen, UA 0.2 E.U./dL    Narrative:      In absence of clinical symptoms, the presence of pyuria, bacteria, and/or nitrites on the urinalysis result does not correlate with infection.    Phosphorus [[id number]]  (Abnormal) Collected: 12/16/22 0259    Specimen: Blood Updated: 12/16/22 0404     Phosphorus 8.9 mg/dL     Magnesium [[id number]]  (Normal) Collected: 12/16/22 0259    Specimen: Blood Updated: 12/16/22 0404     Magnesium 1.9 mg/dL     Vitamin D,25-Hydroxy [[id number]]  (Normal) Collected: 12/15/22 1658    Specimen: Blood Updated: 12/16/22 0209     25 Hydroxy, Vitamin D 37.6 ng/ml     Narrative:      Reference Range for Total Vitamin D 25(OH)     Deficiency <20.0 ng/mL   Insufficiency 21-29 ng/mL   Sufficiency  ng/mL  Toxicity >100 ng/ml    Results may be falsely increased if patient taking Biotin.      Basic Metabolic Panel [[id number]]  (Abnormal) Collected: 12/15/22 2233    Specimen: Blood Updated: 12/15/22 2304     Glucose 121 mg/dL      BUN 75 mg/dL      Creatinine 5.74 mg/dL      Sodium 135 mmol/L      Potassium 8.3 mmol/L      Chloride 99 mmol/L      CO2 22.0 mmol/L      Calcium 9.4 mg/dL      BUN/Creatinine Ratio 13.1     Anion Gap 14.0 mmol/L      eGFR 7.8 mL/min/1.73      Comment: <15 Indicative of kidney failure       Narrative:      GFR Normal >60  Chronic Kidney Disease <60  Kidney Failure <15      Light Blue Top [[id number]] Collected: 12/15/22 1923    Specimen: Blood Updated: 12/15/22 2032     Extra Tube Hold for add-ons.     Comment: Auto resulted       Basic Metabolic Panel [[id number]]  (Abnormal) Collected: 12/15/22 1658    Specimen: Blood Updated: 12/15/22 1832     Glucose 67 mg/dL      BUN 75 mg/dL      Creatinine 6.50 mg/dL      Sodium 138 mmol/L      Potassium 7.3 mmol/L      Comment: Slight hemolysis detected by  analyzer. Results may be affected.        Chloride 100 mmol/L      CO2 18.0 mmol/L      Calcium 9.3 mg/dL      BUN/Creatinine Ratio 11.5     Anion Gap 20.0 mmol/L      eGFR 6.7 mL/min/1.73      Comment: <15 Indicative of kidney failure       Narrative:      GFR Normal >60  Chronic Kidney Disease <60  Kidney Failure <15      TSH [[id number]]  (Abnormal) Collected: 12/15/22 1658    Specimen: Blood Updated: 12/15/22 1815     TSH 8.680 uIU/mL     Lipid Panel [[id number]] Collected: 12/15/22 1658    Specimen: Blood Updated: 12/15/22 1809     Total Cholesterol 135 mg/dL      Triglycerides 88 mg/dL      HDL Cholesterol 53 mg/dL      LDL Cholesterol  65 mg/dL      VLDL Cholesterol 17 mg/dL      LDL/HDL Ratio 1.22    Narrative:      Cholesterol Reference Ranges  (U.S. Department of Health and Human Services ATP III Classifications)    Desirable          <200 mg/dL  Borderline High    200-239 mg/dL  High Risk          >240 mg/dL      Triglyceride Reference Ranges  (U.S. Department of Health and Human Services ATP III Classifications)    Normal           <150 mg/dL  Borderline High  150-199 mg/dL  High             200-499 mg/dL  Very High        >500 mg/dL    HDL Reference Ranges  (U.S. Department of Health and Human Services ATP III Classifications)    Low     <40 mg/dl (major risk factor for CHD)  High    >60 mg/dl ('negative' risk factor for CHD)        LDL Reference Ranges  (U.S. Department of Health and Human Services ATP III Classifications)    Optimal          <100 mg/dL  Near Optimal     100-129 mg/dL  Borderline High  130-159 mg/dL  High             160-189 mg/dL  Very High        >189 mg/dL    Hepatitis B Surface Antigen [[id number]]  (Normal) Collected: 12/15/22 1658    Specimen: Blood Updated: 12/15/22 1757     Hepatitis B Surface Ag Non-Reactive    Hemoglobin A1c [[id number]]  (Normal) Collected: 12/15/22 1657    Specimen: Blood Updated: 12/15/22 1733     Hemoglobin A1C 5.30 %     Narrative:      Hemoglobin A1C  Ranges:    Increased Risk for Diabetes  5.7% to 6.4%  Diabetes                     >= 6.5%  Diabetic Goal                < 7.0%    Urine Drug Screen - Urine, Clean Catch [[id number]]  (Abnormal) Collected: 12/15/22 1721    Specimen: Urine, Clean Catch Updated: 12/15/22 1733     THC, Screen, Urine Negative     Phencyclidine (PCP), Urine Negative     Cocaine Screen, Urine Negative     Methamphetamine, Ur Negative     Opiate Screen Negative     Amphetamine Screen, Urine Negative     Benzodiazepine Screen, Urine Negative     Tricyclic Antidepressants Screen Negative     Methadone Screen, Urine Negative     Barbiturates Screen, Urine Negative     Oxycodone Screen, Urine Positive     Propoxyphene Screen Negative     Buprenorphine, Screen, Urine Negative    Narrative:      Cutoff For Drugs Screened:    Amphetamines               500 ng/ml  Barbiturates               200 ng/ml  Benzodiazepines            150 ng/ml  Cocaine                    150 ng/ml  Methadone                  200 ng/ml  Opiates                    100 ng/ml  Phencyclidine               25 ng/ml  THC                            50 ng/ml  Methamphetamine            500 ng/ml  Tricyclic Antidepressants  300 ng/ml  Oxycodone                  100 ng/ml  Propoxyphene               300 ng/ml  Buprenorphine               10 ng/ml    The normal value for all drugs tested is negative. This report includes unconfirmed screening results, with the cutoff values listed, to be used for medical treatment purposes only.  Unconfirmed results must not be used for non-medical purposes such as employment or legal testing.  Clinical consideration should be applied to any drug of abuse test, particularly when unconfirmed results are used.          Imaging Results (Most Recent)     Procedure Component Value Units Date/Time    XR Chest 1 View [[id number]] Collected: 12/16/22 1804     Updated: 12/16/22 1856    Narrative:      PROCEDURE: XR CHEST 1 VIEW, 12/16/2022 6:04 PM  CST    CLINICAL INDICATION:    Post-Op, I87.2 Venous insufficiency  (chronic) (peripheral) I27.20 Pulmonary hypertension, unspecified  M79.672 Pain in left foot I73.9 Peripheral vascular disease,  unspecified Z99.81 Dependence on supplemental oxygen N18.6 End  stage renal disease Z99.2 Dependence on renal dialysis I65.23  Occlusion and stenosis of bilateral carotid arteries E66.01  Morbid (severe) obesity due to excess calories Z68.4.    COMPARISON: 7/1/2022    TECHNIQUE:  AP portable radiograph of chest     FINDINGS:    Unchanged left neck catheter and right neck catheter. Interval  removal of enteric tube.    Cardiac silhouette is stable. Moderate interstitial prominence  and hazy retrocardiac opacification. No pneumothorax.      Impression:        Moderate interstitial prominence may reflect interstitial edema  or infectious processes.    Hazy retrocardiac opacification may reflect edema, infiltrate, or  atelectasis.        Electronically signed by:  Jm Mckeon MD  12/16/2022 6:54 PM  CST Workstation: [id number]          Chief Complaint on Day of Discharge: Improving confusion, left stump pain    Hospital Course:  The patient is a 62 y.o. female who presented to [facility] with left foot pain secondary to necrosis.  Patient was started on IV antibiotics and orthopedics was consulted.  Plan was made for below the knee amputation.  Patient also has end-stage renal disease and nephrology was consulted for dialysis.  Patient was kept on IV antibiotics postoperatively.  She had significant confusion postoperatively that improved over the remainder of her hospital stay.  Patient's sister states this is pretty common for her.  As she returned toward baseline attention was turned to disposition plan.  Disposition was complicated by the need for significant rehab given her new left BKA coupled with her need for hemodialysis.  Patient was accepted to skilled facility for rehab.  She was cleared for  discharge by orthopedic surgery and was discharged in hemodynamically stable condition.    Condition on Discharge: Hemodynamically stable    Physical Exam on Discharge:  /66   Pulse 80   Temp 98.2 °F (36.8 °C) (Tympanic)   Resp 18   Ht 157.5 cm (62.01\")   Wt 112 kg (247 lb)   LMP  (LMP Unknown)   SpO2 97%   BMI 45.17 kg/m²      Physical Exam  Vitals reviewed.   Constitutional:       Appearance: She is well-developed. She is morbidly obese.      Comments: BMI 45.17   HENT:      Head: Normocephalic and atraumatic.   Eyes:      Pupils: Pupils are equal, round, and reactive to light.   Cardiovascular:      Rate and Rhythm: Normal rate and regular rhythm.      Heart sounds: Normal heart sounds. No murmur heard.    No friction rub. No gallop.   Pulmonary:      Effort: Pulmonary effort is normal. No respiratory distress.      Breath sounds: Normal breath sounds. No wheezing or rales.   Chest:      Chest wall: No tenderness.   Abdominal:      General: Bowel sounds are normal. There is no distension.      Palpations: Abdomen is soft.      Tenderness: There is no abdominal tenderness. There is no guarding.   Musculoskeletal:      Cervical back: Normal range of motion and neck supple.      Comments: Left BKA   Skin:     General: Skin is warm and dry.   Neurological:      Comments: Intermittent confusion   Psychiatric:         Behavior: Behavior normal.         Thought Content: Thought content normal.           Discharge Disposition:  Skilled Nursing Facility (DC - External)    Discharge Medications:     Discharge Medications      New Medications      Instructions Start Date   calcium carbonate 500 MG chewable tablet  Commonly known as: TUMS   2 tablets, Oral, 2 Times Daily PRN      carvedilol 6.25 MG tablet  Commonly known as: COREG   6.25 mg, Oral, 2 Times Daily With Meals      furosemide 80 MG tablet  Commonly known as: Lasix   80 mg, Oral, 4 Times Weekly, Take on Monday, Wednesday, Friday, and Sunday   Start  Date: December 24, 2022     gabapentin 300 MG capsule  Commonly known as: NEURONTIN   300 mg, Oral, Daily      insulin detemir 100 UNIT/ML injection  Commonly known as: LEVEMIR   20 Units, Subcutaneous, Every 12 Hours Scheduled      ondansetron 4 MG tablet  Commonly known as: ZOFRAN   4 mg, Oral, Every 6 Hours PRN      temazepam 15 MG capsule  Commonly known as: RESTORIL   15 mg, Oral, Nightly PRN         Changes to Medications      Instructions Start Date   Insulin Lispro (1 Unit Dial) 100 UNIT/ML solution pen-injector  Commonly known as: HUMALOG  What changed:   · how much to take  · additional instructions  · Another medication with the same name was removed. Continue taking this medication, and follow the directions you see here.   14-35 Units, Subcutaneous, 3 Times Daily With Meals, Takes 14 units with meals plus sliding scale Sliding scale:  150-199 take 4 units 200-249 take 8 units 250-299 take 12 units 300-349 take 16 units 350-400 take 20 units greater than 400, call physician      losartan 25 MG tablet  Commonly known as: COZAAR  What changed:   · medication strength  · how much to take   25 mg, Oral, Daily   Start Date: December 24, 2022        Continue These Medications      Instructions Start Date   acetaminophen 650 MG 8 hr tablet  Commonly known as: Tylenol 8 Hour   650 mg, Oral, Every 8 Hours PRN      albuterol sulfate  (90 Base) MCG/ACT inhaler  Commonly known as: PROVENTIL HFA;VENTOLIN HFA;PROAIR HFA   2 puffs, Inhalation, Every 4 Hours PRN      albuterol (2.5 MG/3ML) 0.083% nebulizer solution  Commonly known as: PROVENTIL   Inhale the contents of 1 vial by nebulization Every 4 (Four) Hours As Needed for Wheezing.      atorvastatin 20 MG tablet  Commonly known as: LIPITOR   20 mg, Oral, Every Night at Bedtime      Capsaicin 0.035 % cream   3 g, Apply externally, 3 Times Daily PRN      Cetirizine HCl 10 MG capsule   1 capsule, Oral, Daily      clopidogrel 75 MG tablet  Commonly known as:  PLAVIX   75 mg, Oral, Daily      [employer] Blood Glucose Meter w/Device kit   1 each, Does not apply, 3 Times Daily      CYCLOBENZAPRINE HCL PO   5 mg, Oral, 2 Times Daily      Diclofenac Sodium 1 % gel gel  Commonly known as: VOLTAREN   4 g, Topical, 4 Times Daily PRN      docusate sodium 100 MG capsule  Commonly known as: COLACE   100 mg, Oral, 2 Times Daily      DULoxetine 20 MG capsule  Commonly known as: CYMBALTA   20 mg, Oral, Daily      Easy Touch Insulin Syringe 30G X 5/16\" 0.5 ML misc  Generic drug: Insulin Syringe-Needle U-100   USE AS DIRECTED WITH LEVEMIR      Easy Touch Pen Needles 31G X 8 MM misc  Generic drug: Insulin Pen Needle   No dose, route, or frequency recorded.      NovoFine 30G X 8 MM misc  Generic drug: Insulin Pen Needle   As directed 4 times daily      B-D ULTRAFINE III SHORT PEN 31G X 8 MM misc  Generic drug: Insulin Pen Needle   Use to inject insulin 4 (Four) Times a Day as directed.      hydrOXYzine 25 MG tablet  Commonly known as: ATARAX   25 mg, Oral, Every 8 Hours PRN      ipratropium-albuterol 0.5-2.5 mg/3 ml nebulizer  Commonly known as: DUO-NEB   3 mL, Nebulization, 4 Times Daily PRN      memantine 5 MG tablet  Commonly known as: NAMENDA   5 mg, Oral, 2 Times Daily      MIRCERA IJ   225 mcg, Every 14 Days      montelukast 10 MG tablet  Commonly known as: SINGULAIR   10 mg, Oral, Nightly      nitroglycerin 0.4 MG SL tablet  Commonly known as: NITROSTAT   0.4 mg, Sublingual, Every 5 Minutes PRN      O2  Commonly known as: OXYGEN   2 L/min, Inhalation, Continuous      oxyCODONE 10 MG tablet  Commonly known as: ROXICODONE   10 mg, Oral, 4 Times Daily PRN      pantoprazole 40 MG EC tablet  Commonly known as: PROTONIX   40 mg, Oral, Nightly      promethazine 25 MG tablet  Commonly known as: PHENERGAN   25 mg, Oral, Every 6 Hours PRN      sevelamer 800 MG tablet  Commonly known as: RENVELA   800 mg, Oral, 3 Times Daily With Meals      Vitamin D (Ergocalciferol) 91755 units capsule   50,000  Units, Oral, Weekly, Take on Wednesday         Stop These Medications    glucose blood test strip     ondansetron ODT 4 MG disintegrating tablet  Commonly known as: Zofran ODT            Discharge Diet:   Diet Instructions     Diet: Renal, Consistent Carbohydrate      Discharge Diet:  Renal  Consistent Carbohydrate             Activity at Discharge:   Activity Instructions     Other Activity Instructions      Activity Instructions: PT/OT          Follow-up Appointments:   PCP in 1 to 2 weeks  Orthopedics in 1 week.          This document has been electronically signed by Jef Ramires MD on December 23, 2022 16:28 CST      Time: 35 minutes                Electronically signed by Jef Ramires MD at 12/23/22 1644       Discharge Order (From admission, onward)     Start     Ordered    12/23/22 1129  Discharge patient  Once        Expected Discharge Date: 12/23/22    Expected Discharge Time: Midday    Discharge Disposition: Skilled Nursing Facility (DC - External)    Physician of Record for Attribution - Please select from Treatment Team: RANDOLPH DWYER [[id number]]    Review needed by CMO to determine Physician of Record: No       Question Answer Comment   Physician of Record for Attribution - Please select from Treatment Team RANDOLPH DWYER    Review needed by CMO to determine Physician of Record No        12/23/22 1149

## 2023-01-09 RX ORDER — DULOXETIN HYDROCHLORIDE 20 MG/1
CAPSULE, DELAYED RELEASE ORAL
Qty: 30 CAPSULE | Refills: 1 | OUTPATIENT
Start: 2023-01-09

## 2023-01-11 ENCOUNTER — OFFICE VISIT (OUTPATIENT)
Dept: ORTHOPEDIC SURGERY | Facility: CLINIC | Age: 63
End: 2023-01-11
Payer: MEDICARE

## 2023-01-11 VITALS — BODY MASS INDEX: 45.45 KG/M2 | WEIGHT: 247 LBS | HEIGHT: 62 IN

## 2023-01-11 DIAGNOSIS — Z89.512 S/P BKA (BELOW KNEE AMPUTATION), LEFT: Primary | ICD-10-CM

## 2023-01-11 PROCEDURE — 99024 POSTOP FOLLOW-UP VISIT: CPT | Performed by: NURSE PRACTITIONER

## 2023-01-11 NOTE — PROGRESS NOTES
Yamileth Slater is a 62 y.o. female is s/p    Procedures with active global billing periods:   1. NM AMPUTATION LEG THROUGH TIBIA&FIBULA until 03/16/23 (Remaining days: 64)   2. NM AMPUTATION LEG THROUGH TIBIA&FIBULA until 03/16/23 (Remaining days: 64) 12/16/22 (3w 5d) Huy White MD    Amputation Below Knee, Left - Left          Chief Complaint   Patient presents with   • Left Knee - Post-op Follow-up       HISTORY OF PRESENT ILLNESS: This 62-year-old female patient presents today for 3-week 3-day follow-up status post left BKA.  This patient is currently in ARU at [facility] for rehab.  This patient has extensive history of chronic respiratory failure with home oxygen requirements, type 2 diabetes mellitus, morbid obesity, end-stage renal disease with dialysis, coronary artery disease and wounds to bilateral feet.  She followed up with Dr. White on 12/13/2022 after undergoing wound care for a midfoot amputation on the left side and great toe wound on the right foot.  She had multiple revascularization attempts for distal blood supply in the left leg; unfortunately, this efforts were not successful and the patient was following up with Dr. White to discuss further treatment options.  The patient was scheduled for surgical intervention per Dr. White on 12/16/2024 however, the patient went to the ER on 12/15/2022 with worsening symptoms related to the left lower extremity..  The patient is here today for a follow-up status post left BKA.  Her sister is present with her.  The patient sister is voicing concern regarding the wound to her right foot.  The patient has no complaints or concerns related to the left stump and is interested in a prosthetic fitting.  The patient reports the nursing home removed her staples and placed the Steri-Strips to the surgical incision that remain in place.  The patient is unsure when the staples were removed.  She denies tingling or tingling however, reports  decreased sensation.  Denies fever and chills.  Patient reports she is participating in physical therapy but has difficulty because she has wounds to her right foot as well.  The patient's right foot is wrapped and she is wearing a surgical shoe.  Patient's sister is wishing to follow-up with Dr. White regarding the right foot.  This patient has been seeing Dr. Oliveira for wound care and treatment to the right foot.      Allergies   Allergen Reactions   • Adhesive Tape Hives, Other (See Comments) and Rash   • Nsaids Hives   • Latex Other (See Comments) and Hives   • Other Itching     Bandaids, MRSA, SURECLOSE   • Wound Dressing Adhesive Hives and Rash         Current Outpatient Medications:   •  acetaminophen (Tylenol 8 Hour) 650 MG 8 hr tablet, Take 1 tablet by mouth Every 8 (Eight) Hours As Needed for Mild Pain ., Disp: 90 tablet, Rfl: 0  •  albuterol (PROVENTIL) (2.5 MG/3ML) 0.083% nebulizer solution, Inhale the contents of 1 vial by nebulization Every 4 (Four) Hours As Needed for Wheezing., Disp: 75 mL, Rfl: 3  •  albuterol sulfate  (90 Base) MCG/ACT inhaler, Inhale 2 puffs Every 4 (Four) Hours As Needed for Wheezing., Disp: 18 g, Rfl: 1  •  atorvastatin (LIPITOR) 20 MG tablet, Take 1 tablet by mouth every night at bedtime., Disp: 90 tablet, Rfl: 0  •  Blood Glucose Monitoring Suppl ([employer]S Blood Glucose Meter) w/Device kit, 1 each 3 (Three) Times a Day., Disp: 1 kit, Rfl: 3  •  calcium carbonate (TUMS) 500 MG chewable tablet, Chew 2 tablets 2 (Two) Times a Day As Needed for Heartburn., Disp: , Rfl:   •  Capsaicin 0.035 % cream, Apply 3 g topically 3 (Three) Times a Day As Needed (ankle pain)., Disp: 42.5 g, Rfl: 2  •  carvedilol (COREG) 6.25 MG tablet, Take 1 tablet by mouth 2 (Two) Times a Day With Meals., Disp: , Rfl:   •  Cetirizine HCl 10 MG capsule, Take 1 capsule by mouth Daily., Disp: , Rfl:   •  clopidogrel (PLAVIX) 75 MG tablet, Take 1 tablet by mouth Daily., Disp: 30 tablet, Rfl: 5  •   "CYCLOBENZAPRINE HCL PO, Take 5 mg by mouth 2 (Two) Times a Day., Disp: , Rfl:   •  Diclofenac Sodium (VOLTAREN) 1 % gel gel, Apply 4 g topically to the appropriate area as directed 4 (Four) Times a Day As Needed (Ankle pain)., Disp: 350 g, Rfl: 2  •  docusate sodium (COLACE) 100 MG capsule, Take 100 mg by mouth 2 (Two) Times a Day., Disp: , Rfl:   •  DULoxetine (CYMBALTA) 20 MG capsule, Take 20 mg by mouth Daily. Indications: Disease of the Peripheral Nerves, Disp: , Rfl:   •  Easy Touch Insulin Syringe 30G X 5/16\" 0.5 ML misc, USE AS DIRECTED WITH LEVEMIR, Disp: , Rfl:   •  EASY TOUCH PEN NEEDLES 31G X 8 MM misc, , Disp: , Rfl:   •  furosemide (Lasix) 80 MG tablet, Take 1 tablet by mouth 4 (Four) Times a Week. Take on Monday, Wednesday, Friday, and Sunday, Disp: , Rfl:   •  gabapentin (NEURONTIN) 300 MG capsule, Take 1 capsule by mouth Daily., Disp: 3 capsule, Rfl: 0  •  hydrOXYzine (ATARAX) 25 MG tablet, Take 25 mg by mouth Every 8 (Eight) Hours As Needed for Itching., Disp: , Rfl:   •  insulin detemir (LEVEMIR) 100 UNIT/ML injection, Inject 20 Units under the skin into the appropriate area as directed Every 12 (Twelve) Hours., Disp: , Rfl: 12  •  Insulin Lispro, 1 Unit Dial, (HUMALOG) 100 UNIT/ML solution pen-injector, Inject 14-35 Units under the skin into the appropriate area as directed 3 (Three) Times a Day With Meals. Takes 14 units with meals plus sliding scale Sliding scale:  150-199 take 4 units 200-249 take 8 units 250-299 take 12 units 300-349 take 16 units 350-400 take 20 units greater than 400, call physician, Disp: , Rfl:   •  Insulin Pen Needle (NovoFine) 30G X 8 MM misc, As directed 4 times daily, Disp: 100 each, Rfl: 11  •  Insulin Pen Needle 31G X 8 MM misc, Use to inject insulin 4 (Four) Times a Day as directed., Disp: 120 each, Rfl: 12  •  ipratropium-albuterol (DUO-NEB) 0.5-2.5 mg/3 ml nebulizer, Take 3 mL by nebulization 4 (Four) Times a Day As Needed., Disp: , Rfl:   •  losartan (COZAAR) 25 " MG tablet, Take 1 tablet by mouth Daily., Disp: , Rfl:   •  memantine (NAMENDA) 5 MG tablet, Take 5 mg by mouth 2 (Two) Times a Day., Disp: , Rfl:   •  Methoxy PEG-Epoetin Beta (MIRCERA IJ), 225 mcg Every 14 (Fourteen) Days., Disp: , Rfl:   •  montelukast (SINGULAIR) 10 MG tablet, Take 1 tablet by mouth Every Night., Disp: 90 tablet, Rfl: 0  •  nitroglycerin (NITROSTAT) 0.4 MG SL tablet, Place 0.4 mg under the tongue Every 5 (Five) Minutes As Needed for Chest Pain (x 3 doses)., Disp: , Rfl:   •  O2 (OXYGEN), Inhale 2 L/min Continuous., Disp: , Rfl:   •  ondansetron (ZOFRAN) 4 MG tablet, Take 1 tablet by mouth Every 6 (Six) Hours As Needed for Nausea or Vomiting., Disp: , Rfl:   •  oxyCODONE (ROXICODONE) 10 MG tablet, Take 1 tablet by mouth 4 (Four) Times a Day As Needed for Moderate Pain or Severe Pain., Disp: 8 tablet, Rfl: 0  •  pantoprazole (PROTONIX) 40 MG EC tablet, Take 1 tablet by mouth Every Night., Disp: 90 tablet, Rfl: 0  •  promethazine (PHENERGAN) 25 MG tablet, Take 25 mg by mouth Every 6 (Six) Hours As Needed for Nausea or Vomiting., Disp: , Rfl:   •  sevelamer (RENVELA) 800 MG tablet, Take 800 mg by mouth 3 (Three) Times a Day With Meals., Disp: , Rfl:   •  Vitamin D, Ergocalciferol, 99931 units capsule, Take 50,000 Units by mouth 1 (One) Time Per Week. Take on Wednesday  Indications: Kidney Failure Syndrome, Disp: , Rfl:     No fevers or chills.  No nausea or vomiting.      PHYSICAL EXAMINATION:       Yamileth Slater is a 62 y.o. female    Patient is awake and alert, answers questions appropriately and is in no apparent distress.    GAIT:     []  Normal  []  Antalgic    Assistive device: []  None  []  Walker     []  Crutches  []  Cane     [x]  Wheelchair  []  Stretcher    Left Knee Exam     Comments:  Stump is wrapped in Kerlix upon arrival.  Surgical incision is healing as expected.  The skin is well approximated.  Surrounding skin is warm, dry and intact.  The Steri-Strips remain intact.  No  signs of infection.  No streaking, no drainage, no erythema.  Minimal swelling noted.  No increased warmth noted.  Patient is able to extend her knee without difficulty or pain.  Popliteal pulse palpable.                No results found.        ASSESSMENT:    Diagnoses and all orders for this visit:    S/P BKA (below knee amputation), left (HCC)  -     Miscellaneous DME          PLAN  Surgical incision care and Steri-Strip education provided.  Advise she may shower and use soap and water to wash her left stump.  Make sure it is dry after washing. Advised not to soak in water.  Advised the patient the Steri-Strip should only be on approximately 7 to 10 days.  Educated the patient and the sister her stump needs to be inspected daily.  Advised the patient to cover with her pant leg when in public or community areas of the nursing home otherwise, does not need a dressing unless there is drainage.  Reviewed signs and symptoms of infection.  Advised patient if she experiences any of the symptoms we discussed to call our office or go to the ER if we are unavailable.  New order sent for a prosthetic fitting to the left lower extremity.    Patient has a positive attitude and stresses a strong desire to ambulate utilizing his prosthesis so they can remain active productive and independent    The patient was considered a K 1 (The patient has the ability or potential to use a prosthesis for transfer or ambulation on level surfaces at fixed cadance.  Typical for people who only walk in their home.) prior to surgery.  Once fit with the prosthesis, the patient should be able to resume the K 1 (The patient has the ability or potential to use a prosthesis for transfer or ambulation on level surfaces at fixed cadance.  Typical for people who only walk in their home.) status.    Patient is ready to move forward with prosthetic care and is being referred to [facility] for a lefttrans-tibial  prosthesis due to the amputation surgery  on December 16, 2022 resulting from an amputation reason: non-healing wound .  The devices is necessary for this patient to ambulate independently and ensure  that the patient completes rehabilitation.  The device meets the patient's specific needs and is keeping with the current standards of medical practice for the treatment of a lefttrans-tibial amputation .     As for the sisters concern regarding the patient's right foot wound, I recommend she follow-up with vascular and Dr. Oliveira for further treatment.  We also discussed the importance of controlling her blood sugar.  Patient verbalizes understanding.  Recommended to follow-up in 4 weeks for a reevaluation of the left BKA.  May follow-up sooner as needed if symptoms change or worsen or for new complaints.    All questions and concerns are addressed with understanding noted. They are aware and are in agreement to this plan.    Return in about 4 weeks (around 2/8/2023).    BRIDGETT Stern    This document has been electronically signed by BRIDGETT Stern on January 16, 2023 14:20 CST      EMR Dragon/Transciption Disclaimer: Some of this note may be an electronic transcription/translation of spoken language to printed text using the Dragon Dictation System.

## 2023-01-31 ENCOUNTER — LAB REQUISITION (OUTPATIENT)
Dept: LAB | Facility: HOSPITAL | Age: 63
End: 2023-01-31
Payer: MEDICARE

## 2023-01-31 DIAGNOSIS — R89.5 ABNORMAL MICROBIOLOGICAL FINDINGS IN SPECIMENS FROM OTHER ORGANS, SYSTEMS AND TISSUES: ICD-10-CM

## 2023-01-31 PROCEDURE — 87147 CULTURE TYPE IMMUNOLOGIC: CPT | Performed by: INTERNAL MEDICINE

## 2023-01-31 PROCEDURE — 87205 SMEAR GRAM STAIN: CPT | Performed by: INTERNAL MEDICINE

## 2023-01-31 PROCEDURE — 87070 CULTURE OTHR SPECIMN AEROBIC: CPT | Performed by: INTERNAL MEDICINE

## 2023-01-31 PROCEDURE — 87186 SC STD MICRODIL/AGAR DIL: CPT | Performed by: INTERNAL MEDICINE

## 2023-02-02 LAB
BACTERIA SPEC AEROBE CULT: ABNORMAL
GRAM STN SPEC: ABNORMAL

## 2023-02-15 ENCOUNTER — LAB (OUTPATIENT)
Dept: LAB | Facility: HOSPITAL | Age: 63
End: 2023-02-15
Payer: MEDICARE

## 2023-02-15 ENCOUNTER — OFFICE VISIT (OUTPATIENT)
Dept: WOUND CARE | Facility: HOSPITAL | Age: 63
End: 2023-02-15
Payer: MEDICARE

## 2023-02-15 ENCOUNTER — HOSPITAL ENCOUNTER (OUTPATIENT)
Dept: GENERAL RADIOLOGY | Facility: HOSPITAL | Age: 63
Discharge: HOME | End: 2023-02-15
Payer: MEDICARE

## 2023-02-15 DIAGNOSIS — S91.301A WOUND OF RIGHT FOOT: ICD-10-CM

## 2023-02-15 DIAGNOSIS — S91.301A WOUND OF RIGHT FOOT: Primary | ICD-10-CM

## 2023-02-15 LAB
ALBUMIN SERPL-MCNC: 2.9 G/DL (ref 3.5–5.2)
ALBUMIN/GLOB SERPL: 0.6 G/DL
ALP SERPL-CCNC: 250 U/L (ref 39–117)
ALT SERPL W P-5'-P-CCNC: 11 U/L (ref 1–33)
ANION GAP SERPL CALCULATED.3IONS-SCNC: 13 MMOL/L (ref 5–15)
AST SERPL-CCNC: 12 U/L (ref 1–32)
BASOPHILS # BLD AUTO: 0.03 10*3/MM3 (ref 0–0.2)
BASOPHILS NFR BLD AUTO: 0.4 % (ref 0–1.5)
BILIRUB SERPL-MCNC: 0.3 MG/DL (ref 0–1.2)
BUN SERPL-MCNC: 27 MG/DL (ref 8–23)
BUN/CREAT SERPL: 9.7 (ref 7–25)
CALCIUM SPEC-SCNC: 9.3 MG/DL (ref 8.6–10.5)
CHLORIDE SERPL-SCNC: 98 MMOL/L (ref 98–107)
CO2 SERPL-SCNC: 26 MMOL/L (ref 22–29)
CREAT SERPL-MCNC: 2.79 MG/DL (ref 0.57–1)
CRP SERPL-MCNC: 7.21 MG/DL (ref 0–0.5)
DEPRECATED RDW RBC AUTO: 50.8 FL (ref 37–54)
EGFRCR SERPLBLD CKD-EPI 2021: 18.5 ML/MIN/1.73
EOSINOPHIL # BLD AUTO: 0.2 10*3/MM3 (ref 0–0.4)
EOSINOPHIL NFR BLD AUTO: 2.9 % (ref 0.3–6.2)
ERYTHROCYTE [DISTWIDTH] IN BLOOD BY AUTOMATED COUNT: 16.1 % (ref 12.3–15.4)
ERYTHROCYTE [SEDIMENTATION RATE] IN BLOOD: 53 MM/HR (ref 0–30)
GLOBULIN UR ELPH-MCNC: 4.9 GM/DL
GLUCOSE SERPL-MCNC: 184 MG/DL (ref 65–99)
HCT VFR BLD AUTO: 29.6 % (ref 34–46.6)
HGB BLD-MCNC: 9.6 G/DL (ref 12–15.9)
IMM GRANULOCYTES # BLD AUTO: 0.05 10*3/MM3 (ref 0–0.05)
IMM GRANULOCYTES NFR BLD AUTO: 0.7 % (ref 0–0.5)
LYMPHOCYTES # BLD AUTO: 2.02 10*3/MM3 (ref 0.7–3.1)
LYMPHOCYTES NFR BLD AUTO: 29.3 % (ref 19.6–45.3)
MCH RBC QN AUTO: 28.3 PG (ref 26.6–33)
MCHC RBC AUTO-ENTMCNC: 32.4 G/DL (ref 31.5–35.7)
MCV RBC AUTO: 87.3 FL (ref 79–97)
MONOCYTES # BLD AUTO: 0.45 10*3/MM3 (ref 0.1–0.9)
MONOCYTES NFR BLD AUTO: 6.5 % (ref 5–12)
NEUTROPHILS NFR BLD AUTO: 4.15 10*3/MM3 (ref 1.7–7)
NEUTROPHILS NFR BLD AUTO: 60.2 % (ref 42.7–76)
NRBC BLD AUTO-RTO: 0 /100 WBC (ref 0–0.2)
PLATELET # BLD AUTO: 238 10*3/MM3 (ref 140–450)
PMV BLD AUTO: 8.4 FL (ref 6–12)
POTASSIUM SERPL-SCNC: 3.9 MMOL/L (ref 3.5–5.2)
PROT SERPL-MCNC: 7.8 G/DL (ref 6–8.5)
RBC # BLD AUTO: 3.39 10*6/MM3 (ref 3.77–5.28)
SODIUM SERPL-SCNC: 137 MMOL/L (ref 136–145)
WBC NRBC COR # BLD: 6.9 10*3/MM3 (ref 3.4–10.8)

## 2023-02-15 PROCEDURE — 80053 COMPREHEN METABOLIC PANEL: CPT

## 2023-02-15 PROCEDURE — 86140 C-REACTIVE PROTEIN: CPT

## 2023-02-15 PROCEDURE — 85025 COMPLETE CBC W/AUTO DIFF WBC: CPT

## 2023-02-15 PROCEDURE — 36415 COLL VENOUS BLD VENIPUNCTURE: CPT

## 2023-02-15 PROCEDURE — G0463 HOSPITAL OUTPT CLINIC VISIT: HCPCS

## 2023-02-15 PROCEDURE — 85652 RBC SED RATE AUTOMATED: CPT

## 2023-02-15 PROCEDURE — 73620 X-RAY EXAM OF FOOT: CPT

## 2023-02-24 ENCOUNTER — OFFICE VISIT (OUTPATIENT)
Dept: WOUND CARE | Facility: HOSPITAL | Age: 63
End: 2023-02-24
Payer: MEDICARE

## 2023-02-24 ENCOUNTER — OUTSIDE FACILITY SERVICE (OUTPATIENT)
Dept: PODIATRY | Facility: CLINIC | Age: 63
End: 2023-02-24
Payer: MEDICARE

## 2023-02-24 ENCOUNTER — PRE-ADMISSION TESTING (OUTPATIENT)
Dept: PREADMISSION TESTING | Facility: HOSPITAL | Age: 63
End: 2023-02-24
Payer: MEDICARE

## 2023-02-24 VITALS
SYSTOLIC BLOOD PRESSURE: 138 MMHG | HEIGHT: 62 IN | DIASTOLIC BLOOD PRESSURE: 80 MMHG | WEIGHT: 280 LBS | BODY MASS INDEX: 51.53 KG/M2 | HEART RATE: 74 BPM | RESPIRATION RATE: 16 BRPM | OXYGEN SATURATION: 97 %

## 2023-02-24 DIAGNOSIS — M86.271 SUBACUTE OSTEOMYELITIS OF RIGHT FOOT: Primary | ICD-10-CM

## 2023-02-24 PROCEDURE — 99213 OFFICE O/P EST LOW 20 MIN: CPT | Performed by: PODIATRIST

## 2023-02-24 PROCEDURE — G0463 HOSPITAL OUTPT CLINIC VISIT: HCPCS

## 2023-02-24 RX ORDER — SODIUM CHLORIDE 9 MG/ML
1000 INJECTION, SOLUTION INTRAVENOUS CONTINUOUS
Status: CANCELLED | OUTPATIENT
Start: 2023-02-27

## 2023-02-24 RX ORDER — BUPIVACAINE HCL/0.9 % NACL/PF 0.1 %
2 PLASTIC BAG, INJECTION (ML) EPIDURAL ONCE
Status: CANCELLED | OUTPATIENT
Start: 2023-02-27 | End: 2023-02-24

## 2023-02-26 ENCOUNTER — ANESTHESIA EVENT (OUTPATIENT)
Dept: PERIOP | Facility: HOSPITAL | Age: 63
End: 2023-02-26
Payer: MEDICARE

## 2023-02-27 ENCOUNTER — HOSPITAL ENCOUNTER (OUTPATIENT)
Facility: HOSPITAL | Age: 63
Setting detail: HOSPITAL OUTPATIENT SURGERY
Discharge: HOME | End: 2023-02-27
Attending: PODIATRIST | Admitting: PODIATRIST
Payer: MEDICARE

## 2023-02-27 ENCOUNTER — ANESTHESIA (OUTPATIENT)
Dept: PERIOP | Facility: HOSPITAL | Age: 63
End: 2023-02-27
Payer: MEDICARE

## 2023-02-27 VITALS
SYSTOLIC BLOOD PRESSURE: 126 MMHG | DIASTOLIC BLOOD PRESSURE: 61 MMHG | WEIGHT: 246.9 LBS | BODY MASS INDEX: 45.16 KG/M2 | RESPIRATION RATE: 16 BRPM | TEMPERATURE: 97 F | HEART RATE: 76 BPM | OXYGEN SATURATION: 93 %

## 2023-02-27 DIAGNOSIS — M86.271 SUBACUTE OSTEOMYELITIS OF RIGHT FOOT: ICD-10-CM

## 2023-02-27 LAB
ANION GAP SERPL CALCULATED.3IONS-SCNC: 9 MMOL/L (ref 5–15)
BUN SERPL-MCNC: 45 MG/DL (ref 8–23)
BUN/CREAT SERPL: 12 (ref 7–25)
CALCIUM SPEC-SCNC: 9.8 MG/DL (ref 8.6–10.5)
CHLORIDE SERPL-SCNC: 100 MMOL/L (ref 98–107)
CO2 SERPL-SCNC: 28 MMOL/L (ref 22–29)
CREAT SERPL-MCNC: 3.75 MG/DL (ref 0.57–1)
EGFRCR SERPLBLD CKD-EPI 2021: 13 ML/MIN/1.73
GLUCOSE BLDC GLUCOMTR-MCNC: 261 MG/DL (ref 70–130)
GLUCOSE SERPL-MCNC: 263 MG/DL (ref 65–99)
POTASSIUM SERPL-SCNC: 4.1 MMOL/L (ref 3.5–5.2)
SODIUM SERPL-SCNC: 137 MMOL/L (ref 136–145)

## 2023-02-27 PROCEDURE — 28820 AMPUTATION OF TOE: CPT | Performed by: PODIATRIST

## 2023-02-27 PROCEDURE — 28111 PART REMOVAL OF METATARSAL: CPT | Performed by: PODIATRIST

## 2023-02-27 PROCEDURE — 88311 DECALCIFY TISSUE: CPT

## 2023-02-27 PROCEDURE — 25010000002 HYDROMORPHONE 1 MG/ML SOLUTION: Performed by: NURSE ANESTHETIST, CERTIFIED REGISTERED

## 2023-02-27 PROCEDURE — 88305 TISSUE EXAM BY PATHOLOGIST: CPT

## 2023-02-27 PROCEDURE — 25010000002 CEFAZOLIN PER 500 MG: Performed by: PODIATRIST

## 2023-02-27 PROCEDURE — 25010000002 FENTANYL CITRATE (PF) 50 MCG/ML SOLUTION: Performed by: NURSE ANESTHETIST, CERTIFIED REGISTERED

## 2023-02-27 PROCEDURE — S0260 H&P FOR SURGERY: HCPCS | Performed by: PODIATRIST

## 2023-02-27 PROCEDURE — 25010000002 PROPOFOL 200 MG/20ML EMULSION: Performed by: NURSE ANESTHETIST, CERTIFIED REGISTERED

## 2023-02-27 PROCEDURE — 25010000002 ONDANSETRON PER 1 MG: Performed by: NURSE ANESTHETIST, CERTIFIED REGISTERED

## 2023-02-27 PROCEDURE — 80048 BASIC METABOLIC PNL TOTAL CA: CPT | Performed by: ANESTHESIOLOGY

## 2023-02-27 PROCEDURE — 82962 GLUCOSE BLOOD TEST: CPT

## 2023-02-27 PROCEDURE — 25010000002 MIDAZOLAM PER 1 MG: Performed by: NURSE ANESTHETIST, CERTIFIED REGISTERED

## 2023-02-27 RX ORDER — EPHEDRINE SULFATE 50 MG/ML
5 INJECTION, SOLUTION INTRAVENOUS ONCE AS NEEDED
Status: DISCONTINUED | OUTPATIENT
Start: 2023-02-27 | End: 2023-02-27 | Stop reason: HOSPADM

## 2023-02-27 RX ORDER — BUPIVACAINE HCL/0.9 % NACL/PF 0.1 %
2 PLASTIC BAG, INJECTION (ML) EPIDURAL ONCE
Status: COMPLETED | OUTPATIENT
Start: 2023-02-27 | End: 2023-02-27

## 2023-02-27 RX ORDER — DIPHENHYDRAMINE HYDROCHLORIDE 50 MG/ML
12.5 INJECTION INTRAMUSCULAR; INTRAVENOUS
Status: DISCONTINUED | OUTPATIENT
Start: 2023-02-27 | End: 2023-02-27 | Stop reason: HOSPADM

## 2023-02-27 RX ORDER — ACETAMINOPHEN 325 MG/1
650 TABLET ORAL ONCE AS NEEDED
Status: DISCONTINUED | OUTPATIENT
Start: 2023-02-27 | End: 2023-02-27 | Stop reason: HOSPADM

## 2023-02-27 RX ORDER — SODIUM CHLORIDE 9 MG/ML
1000 INJECTION, SOLUTION INTRAVENOUS CONTINUOUS
Status: DISCONTINUED | OUTPATIENT
Start: 2023-02-27 | End: 2023-02-27 | Stop reason: HOSPADM

## 2023-02-27 RX ORDER — LIDOCAINE HYDROCHLORIDE 10 MG/ML
INJECTION, SOLUTION EPIDURAL; INFILTRATION; INTRACAUDAL; PERINEURAL AS NEEDED
Status: DISCONTINUED | OUTPATIENT
Start: 2023-02-27 | End: 2023-02-27 | Stop reason: SURG

## 2023-02-27 RX ORDER — BACITRACIN ZINC 500 [USP'U]/G
OINTMENT TOPICAL AS NEEDED
Status: DISCONTINUED | OUTPATIENT
Start: 2023-02-27 | End: 2023-02-27 | Stop reason: HOSPADM

## 2023-02-27 RX ORDER — NALOXONE HCL 0.4 MG/ML
0.4 VIAL (ML) INJECTION AS NEEDED
Status: DISCONTINUED | OUTPATIENT
Start: 2023-02-27 | End: 2023-02-27 | Stop reason: HOSPADM

## 2023-02-27 RX ORDER — MIDAZOLAM HYDROCHLORIDE 1 MG/ML
INJECTION INTRAMUSCULAR; INTRAVENOUS AS NEEDED
Status: DISCONTINUED | OUTPATIENT
Start: 2023-02-27 | End: 2023-02-27 | Stop reason: SURG

## 2023-02-27 RX ORDER — PROMETHAZINE HYDROCHLORIDE 25 MG/1
25 TABLET ORAL ONCE AS NEEDED
Status: DISCONTINUED | OUTPATIENT
Start: 2023-02-27 | End: 2023-02-27 | Stop reason: HOSPADM

## 2023-02-27 RX ORDER — ONDANSETRON 2 MG/ML
4 INJECTION INTRAMUSCULAR; INTRAVENOUS ONCE AS NEEDED
Status: DISCONTINUED | OUTPATIENT
Start: 2023-02-27 | End: 2023-02-27 | Stop reason: HOSPADM

## 2023-02-27 RX ORDER — ACETAMINOPHEN 650 MG/1
650 SUPPOSITORY RECTAL ONCE AS NEEDED
Status: DISCONTINUED | OUTPATIENT
Start: 2023-02-27 | End: 2023-02-27 | Stop reason: HOSPADM

## 2023-02-27 RX ORDER — PROPOFOL 10 MG/ML
INJECTION, EMULSION INTRAVENOUS AS NEEDED
Status: DISCONTINUED | OUTPATIENT
Start: 2023-02-27 | End: 2023-02-27 | Stop reason: SURG

## 2023-02-27 RX ORDER — PROMETHAZINE HYDROCHLORIDE 25 MG/1
25 SUPPOSITORY RECTAL ONCE AS NEEDED
Status: DISCONTINUED | OUTPATIENT
Start: 2023-02-27 | End: 2023-02-27 | Stop reason: HOSPADM

## 2023-02-27 RX ORDER — FENTANYL CITRATE 50 UG/ML
INJECTION, SOLUTION INTRAMUSCULAR; INTRAVENOUS AS NEEDED
Status: DISCONTINUED | OUTPATIENT
Start: 2023-02-27 | End: 2023-02-27 | Stop reason: SURG

## 2023-02-27 RX ORDER — FLUMAZENIL 0.1 MG/ML
0.2 INJECTION INTRAVENOUS AS NEEDED
Status: DISCONTINUED | OUTPATIENT
Start: 2023-02-27 | End: 2023-02-27 | Stop reason: HOSPADM

## 2023-02-27 RX ORDER — ONDANSETRON 2 MG/ML
INJECTION INTRAMUSCULAR; INTRAVENOUS AS NEEDED
Status: DISCONTINUED | OUTPATIENT
Start: 2023-02-27 | End: 2023-02-27 | Stop reason: SURG

## 2023-02-27 RX ADMIN — FENTANYL CITRATE 50 MCG: 50 INJECTION, SOLUTION INTRAMUSCULAR; INTRAVENOUS at 10:13

## 2023-02-27 RX ADMIN — LIDOCAINE HYDROCHLORIDE 100 MG: 10 INJECTION, SOLUTION EPIDURAL; INFILTRATION; INTRACAUDAL; PERINEURAL at 10:15

## 2023-02-27 RX ADMIN — Medication 2 G: at 10:18

## 2023-02-27 RX ADMIN — ONDANSETRON 4 MG: 2 INJECTION INTRAMUSCULAR; INTRAVENOUS at 10:27

## 2023-02-27 RX ADMIN — SODIUM CHLORIDE 1000 ML: 9 INJECTION, SOLUTION INTRAVENOUS at 08:35

## 2023-02-27 RX ADMIN — PROPOFOL 200 MG: 10 INJECTION, EMULSION INTRAVENOUS at 10:15

## 2023-02-27 RX ADMIN — HYDROMORPHONE HYDROCHLORIDE 0.5 MG: 1 INJECTION, SOLUTION INTRAMUSCULAR; INTRAVENOUS; SUBCUTANEOUS at 11:16

## 2023-02-27 RX ADMIN — HYDROMORPHONE HYDROCHLORIDE 0.5 MG: 1 INJECTION, SOLUTION INTRAMUSCULAR; INTRAVENOUS; SUBCUTANEOUS at 11:26

## 2023-02-27 RX ADMIN — MIDAZOLAM HYDROCHLORIDE 2 MG: 1 INJECTION, SOLUTION INTRAMUSCULAR; INTRAVENOUS at 10:05

## 2023-02-27 NOTE — ANESTHESIA PREPROCEDURE EVALUATION
Anesthesia Evaluation     Patient summary reviewed and Nursing notes reviewed   no history of anesthetic complications:  NPO Solid Status: > 8 hours  NPO Liquid Status: > 8 hours           Airway   Mallampati: II  TM distance: >3 FB  Neck ROM: full  possible difficult intubation  Comment: ate: 09/19/22; Time: 1848; Blade Size: 4; Airway Device: LMA; Difficulty: Easy; Atraumatic? Yes; Removal Date: 09/19/22; Removal Time: 1940  Dental    (+) poor dentation    Pulmonary    (+) a smoker Current Abstained day of surgery, COPD moderate, sleep apnea on CPAP, decreased breath sounds,   (-) shortness of breath    ROS comment:     FINDINGS:     Unchanged left neck catheter and right neck catheter. Interval  removal of enteric tube.     Cardiac silhouette is stable. Moderate interstitial prominence  and hazy retrocardiac opacification. No pneumothorax.     IMPRESSION:     Moderate interstitial prominence may reflect interstitial edema  or infectious processes.     Hazy retrocardiac opacification may reflect edema, infiltrate, or  atelectasis.           Electronically signed by:  Jm Mckeon MD  12/16/2022 6:54 PM  Cardiovascular     ECG reviewed  PT is on anticoagulation therapy  Rhythm: regular  Rate: normal    (+) hypertension, past MI , CAD, CABG >6 Months, cardiac stents CHF , ALANIS, murmur (Grade II/VI systolic), PVD, hyperlipidemia,  carotid artery disease carotid bilateral  (-) angina, carotid bruits    ROS comment: Normal sinus rhythm  Right superior axis deviation  Nonspecific intraventricular block  Abnormal ECG  When compared with ECG of 16-DEC-2022 00:19,  No significant change was foundEstimated left ventricular EF = 65% Left ventricular ejection fraction appears to be 61 - 65%. Left ventricular systolic function is normal.Final impression: Successful Pronto thrombectomy and PTCA and stenting of the distal left main and ostial circumflex artery was done with deployment of four 3.25 mm x 15 mm Xience Gloria  drug-eluting stent with reduction of stenosis from 95% to less than 0% stenosis with good BEATRIZ-3 flow.    Neuro/Psych  (+) psychiatric history Anxiety,    GI/Hepatic/Renal/Endo    (+) morbid obesity, GERD well controlled,  renal disease CRI, dialysis and stones, diabetes mellitus type 2 using insulin, thyroid problem hypothyroidism    Musculoskeletal     Abdominal   (+) obese,    Substance History - negative use     OB/GYN negative ob/gyn ROS         Other   arthritis,      ROS/Med Hx Other: HGB 9.6 Right tunneled catheter.                    Anesthesia Plan    ASA 4     general and MAC   total IV anesthesia  intravenous induction     Anesthetic plan, risks, benefits, and alternatives have been provided, discussed and informed consent has been obtained with: patient and spouse/significant other.  Pre-procedure education provided  Plan discussed with CRNA.        CODE STATUS:

## 2023-02-27 NOTE — ANESTHESIA POSTPROCEDURE EVALUATION
Patient: Yamileth Slater    Procedure Summary     Date: 02/27/23 Room / Location: [facility] OR 06 / [facility] OR    Anesthesia Start: 1008 Anesthesia Stop: 1101    Procedure: Excision of right first metatarsal head, right second toe amputation (Right) Diagnosis:       Subacute osteomyelitis of right foot (HCC)      (Subacute osteomyelitis of right foot (HCC) [M86.271])    Surgeons: Jean Oliveira DPM Provider: Marivel Kaye CRNA    Anesthesia Type: general, MAC ASA Status: 4          Anesthesia Type: general, MAC    Vitals  No vitals data found for the desired time range.          Post Anesthesia Care and Evaluation    Patient location during evaluation: PACU  Patient participation: complete - patient participated  Level of consciousness: sleepy but conscious  Pain score: 0  Pain management: adequate    Airway patency: patent  Anesthetic complications: No anesthetic complications  PONV Status: none  Cardiovascular status: acceptable  Respiratory status: acceptable  Hydration status: acceptable    Comments: Bp- 120/81  HR- 72  O2 st- 93%  Tem,p- 98.1  Resp- 16  No anesthesia care post op

## 2023-02-27 NOTE — ANESTHESIA PROCEDURE NOTES
Airway  Urgency: elective    Date/Time: 2/27/2023 10:17 AM  End Time:2/27/2023 10:17 AM  Airway not difficult    General Information and Staff    Patient location during procedure: OR  CRNA/CAA: Marivel Kaye CRNA  SRNA: Keena Monteiro SRNA  Indications and Patient Condition  Indications for airway management: airway protection    Preoxygenated: yes  MILS maintained throughout  Mask difficulty assessment: 0 - not attempted    Final Airway Details  Final airway type: supraglottic airway      Successful airway: I-gel  Size 3     Number of attempts at approach: 1  Assessment: lips, teeth, and gum same as pre-op

## 2023-02-28 NOTE — PLAN OF CARE
February 28, 2023       [address] [facility]    Dear Fady Smith    My name is Zeke Moore,  and I am a Ambulatory Care Manager who partners with Dr. Erika Contreras to improve patients' health. I've been trying to reach you via phone to let you know that, as a member of your care team, I will work with other providers involved in your care, offer education for your specific health conditions, and connect you with more resources as needed. This program is designed to provide you with the opportunity to have a ([id number] [facility]/[address]) care manager partner with you for the following situations:     1) if you come home from the hospital or emergency room   2) to help manage your disease   3) when you would like assistance coordinating services or appointments    This added support is provided at no additional cost to you. My primary focus is to help you achieve specific goals and improve your health. Please call me at [phone] to further discuss how I can support your health care needs.     Sincerely,    Chris Olson RN, CCM Goal Outcome Evaluation:

## 2023-03-01 LAB — REF LAB TEST METHOD: NORMAL

## 2023-03-03 ENCOUNTER — OUTSIDE FACILITY SERVICE (OUTPATIENT)
Dept: PODIATRY | Facility: CLINIC | Age: 63
End: 2023-03-03
Payer: MEDICARE

## 2023-03-03 ENCOUNTER — OFFICE VISIT (OUTPATIENT)
Dept: WOUND CARE | Facility: HOSPITAL | Age: 63
End: 2023-03-03
Payer: MEDICARE

## 2023-03-03 PROCEDURE — 99024 POSTOP FOLLOW-UP VISIT: CPT | Performed by: PODIATRIST

## 2023-03-03 PROCEDURE — G0463 HOSPITAL OUTPT CLINIC VISIT: HCPCS

## 2023-03-05 NOTE — OP NOTE
Date of Procedure: 02/27/2023     SURGEON: Jean Oliveira DPM      Assistant: Aminata Carson MA was responsible for performing the following activities: Retraction, Suction, Irrigation and Placing Dressing and their skilled assistance was necessary for the success of this case.      PREOPERATIVE DIAGNOSIS: Osteomyelitis right foot     POSTOPERATIVE DIAGNOSIS: Same as preop     PROCEDURES PERFORMED:   1.  Right second toe amputation  2.  Excision of right first metatarsal head     ANESTHESIA: General     HEMOSTASIS: Thigh tourniquet set at 220 mmHg     ESTIMATED BLOOD LOSS: 10 mL.     SPECIMEN: Bone and tissue right second toe and right first metatarsal head     MATERIALS: None     COMPLICATIONS: None.      CONDITION: Stable.        INDICATIONS FOR PROCEDURE: This is a patient of mine who has osteomyelitis to right foot.  She agrees to undergo the surgical intervention at this time. Consent is signed and documented in the chart. History and physical exam were reviewed prior to patient being taken to the operating room and n.p.o. status was confirmed. No guarantees were given or implied regarding the outcome of this treatment.      DESCRIPTION OF PROCEDURE: After mild sedation was administered by the anesthesia team in the preoperative holding area the patient was transported to the operating room and placed in a supine position.  General anesthesia was then administered and the right foot was prepped and draped in usual sterile technique and a timeout was performed to confirm the correct patient, correct site, and correct procedure.      Attention was then directed to the right foot where 2 converging semielliptical incisions were made around the base of the second digit extending medially overlying the distal first metatarsal head.  Sharp and blunt dissection carried through the subcutaneous tissue.  Second digit was disarticulated at the second metatarsal phalangeal joint and removed from the operative  field.  A distal first metatarsal head was exposed and was resected with a sagittal bone saw.  Operative site was flushed and closed in layers with Monocryl and nylon.  Dressing applied.  Patient tolerated procedure and anesthesia well was transported from the operating room to the PACU with vital signs stable and neurovascular status intact to the operative extremity.      Plan to discharge patient home once stable per anesthesia.              This document has been electronically signed by Jean Oliveira DPM on March 5, 2023 11:22 CST

## 2023-03-14 ENCOUNTER — HOSPITAL ENCOUNTER (EMERGENCY)
Facility: HOSPITAL | Age: 63
Discharge: HOME | End: 2023-03-14
Attending: EMERGENCY MEDICINE | Admitting: EMERGENCY MEDICINE
Payer: MEDICARE

## 2023-03-14 VITALS
HEART RATE: 71 BPM | BODY MASS INDEX: 44.31 KG/M2 | TEMPERATURE: 97.6 F | WEIGHT: 240.8 LBS | OXYGEN SATURATION: 96 % | HEIGHT: 62 IN | SYSTOLIC BLOOD PRESSURE: 154 MMHG | RESPIRATION RATE: 18 BRPM | DIASTOLIC BLOOD PRESSURE: 70 MMHG

## 2023-03-14 DIAGNOSIS — T82.838A: Primary | ICD-10-CM

## 2023-03-14 LAB
ALBUMIN SERPL-MCNC: 3.1 G/DL (ref 3.5–5.2)
ALBUMIN/GLOB SERPL: 0.6 G/DL
ALP SERPL-CCNC: 255 U/L (ref 39–117)
ALT SERPL W P-5'-P-CCNC: <5 U/L (ref 1–33)
ANION GAP SERPL CALCULATED.3IONS-SCNC: 15 MMOL/L (ref 5–15)
AST SERPL-CCNC: 10 U/L (ref 1–32)
BASOPHILS # BLD AUTO: 0.08 10*3/MM3 (ref 0–0.2)
BASOPHILS NFR BLD AUTO: 0.7 % (ref 0–1.5)
BILIRUB SERPL-MCNC: 0.2 MG/DL (ref 0–1.2)
BUN SERPL-MCNC: 66 MG/DL (ref 8–23)
BUN/CREAT SERPL: 14.1 (ref 7–25)
CALCIUM SPEC-SCNC: 9.9 MG/DL (ref 8.6–10.5)
CHLORIDE SERPL-SCNC: 98 MMOL/L (ref 98–107)
CO2 SERPL-SCNC: 21 MMOL/L (ref 22–29)
CREAT SERPL-MCNC: 4.68 MG/DL (ref 0.57–1)
DEPRECATED RDW RBC AUTO: 51.5 FL (ref 37–54)
EGFRCR SERPLBLD CKD-EPI 2021: 9.9 ML/MIN/1.73
EOSINOPHIL # BLD AUTO: 0.24 10*3/MM3 (ref 0–0.4)
EOSINOPHIL NFR BLD AUTO: 2 % (ref 0.3–6.2)
ERYTHROCYTE [DISTWIDTH] IN BLOOD BY AUTOMATED COUNT: 15.9 % (ref 12.3–15.4)
GLOBULIN UR ELPH-MCNC: 5.1 GM/DL
GLUCOSE SERPL-MCNC: 177 MG/DL (ref 65–99)
HCT VFR BLD AUTO: 34.4 % (ref 34–46.6)
HGB BLD-MCNC: 10.8 G/DL (ref 12–15.9)
IMM GRANULOCYTES # BLD AUTO: 0.09 10*3/MM3 (ref 0–0.05)
IMM GRANULOCYTES NFR BLD AUTO: 0.8 % (ref 0–0.5)
LYMPHOCYTES # BLD AUTO: 2.7 10*3/MM3 (ref 0.7–3.1)
LYMPHOCYTES NFR BLD AUTO: 22.5 % (ref 19.6–45.3)
MCH RBC QN AUTO: 28 PG (ref 26.6–33)
MCHC RBC AUTO-ENTMCNC: 31.4 G/DL (ref 31.5–35.7)
MCV RBC AUTO: 89.1 FL (ref 79–97)
MONOCYTES # BLD AUTO: 0.71 10*3/MM3 (ref 0.1–0.9)
MONOCYTES NFR BLD AUTO: 5.9 % (ref 5–12)
NEUTROPHILS NFR BLD AUTO: 68.1 % (ref 42.7–76)
NEUTROPHILS NFR BLD AUTO: 8.16 10*3/MM3 (ref 1.7–7)
NRBC BLD AUTO-RTO: 0 /100 WBC (ref 0–0.2)
PLATELET # BLD AUTO: 313 10*3/MM3 (ref 140–450)
PMV BLD AUTO: 8.6 FL (ref 6–12)
POTASSIUM SERPL-SCNC: 4.2 MMOL/L (ref 3.5–5.2)
PROT SERPL-MCNC: 8.2 G/DL (ref 6–8.5)
RBC # BLD AUTO: 3.86 10*6/MM3 (ref 3.77–5.28)
SODIUM SERPL-SCNC: 134 MMOL/L (ref 136–145)
WBC NRBC COR # BLD: 11.98 10*3/MM3 (ref 3.4–10.8)

## 2023-03-14 PROCEDURE — 36415 COLL VENOUS BLD VENIPUNCTURE: CPT

## 2023-03-14 PROCEDURE — 99284 EMERGENCY DEPT VISIT MOD MDM: CPT

## 2023-03-14 PROCEDURE — 85025 COMPLETE CBC W/AUTO DIFF WBC: CPT | Performed by: EMERGENCY MEDICINE

## 2023-03-14 PROCEDURE — 80053 COMPREHEN METABOLIC PANEL: CPT | Performed by: EMERGENCY MEDICINE

## 2023-03-14 NOTE — ED PROVIDER NOTES
Subjective   History of Present Illness  This is a 63-year-old female hemodialysis patient who had a fistula placed at an outside facility less than a week ago.  The patient presented for hemodialysis today in this area due to her being transferred here for rehabilitation care.  At hemodialysis they noted bleeding from the postoperative wound and encouraged the patient to come to the emergency department.  The patient has history of chronic anemia and takes Plavix as a home medication due to vascular disease and therefore is concerned about worsening anemia and stopping her bleeding is soon as possible.  Denies fevers and chills.  Denies rash.  Denies traumatic injury.  Denies anticoagulation.        Review of Systems   All other systems reviewed and are negative.      Past Medical History:   Diagnosis Date   • Acute blood loss anemia 04/16/2017    Likely due to gastric oozing at this time. - Dr. Duarte (GI) was consulted and has now signed off, will follow up outpatient - pill colonoscopy showed AVMs - continue to monitor   • Acute cystitis with hematuria 03/31/2021 1/13: IV Rocephin 1 gm q 24 1/14 : transitioned  to omnicef 300mg. Urine cultures resulted and did not show growth. Omnicef discontinued as patient is asymptomatic   • Altered mental status 01/09/2022    - AMS on presentation - initial ABG pH 7.3, CO2 34 - Procal 0.29 - UA negative for acute cysitits -CTA head wnl  - Empiric Zosyn and Vancomycin -Lactate 2.5 on admission  - blood cultures no growth at 24 hours     • Anxiety    • CAD (coronary artery disease) 04/24/2021    S/P 3 stents 5/1/2021 for [facility] Continue ASA 81mg & Clopidogrel 75mg Continue Atorvastatin 40mg   • Carotid artery stenosis    • CHF (congestive heart failure) ([facility])    • Chronic obstructive lung disease ([facility])    • CKD (chronic kidney disease) stage 4, GFR 15-29 ml/min ([facility])    • CKD (chronic kidney disease), symptom management only, stage 5 ([facility]) 10/05/2020    Results from last 7  days Lab Units 12/15/21 0548 12/14/21 1323 12/14/21 0916 CREATININE mg/dL 3.92* 3.21* 3.32*  Baseline creatinine 2-3 GFR 13-25 GFR 15 Dialysis MWF, sees Dr. Lauren Nephrology consult,, appreciate recommendations Continue Bumex 1mg bid daily Holding Bumex 2mg 4 times a week   • Colonic polyp    • Coronary arteriosclerosis    • Diabetes mellitus (HCC)    • Diabetic neuropathy (HCC)    • Ear pain, right 10/18/2021    - canal trauma due to patient scratching and DMT2 - added cortisporin ear drops   • Elevated troponin 10/12/2021    -most likely from CKD -Trending down -Neg chest pain   • Generalized abdominal pain 07/01/2022    Could be due to initiation of tube feeds vs dyspepsia vs abdominal cramps related to no PO intake due to intubation vs constipation Continue current laxative regiment  If no bowel movement by this afternoon will consider enema   • GERD (gastroesophageal reflux disease)    • GI bleed 05/13/2021    - GI will follow up outpatient - Protonix 40mg daily - Avoid medical DVT prophy and use mechanical at this time instead. - Continue to monitor - pill colonoscopy results showed AVMs   • History of transfusion    • Hypercholesterolemia    • Hyperosmolar hyperglycemic state (HHS) ([facility]) 06/25/2022    Serum glucose 605 on admission  Anion gap 16 PH 7.37 Bicarb 27.9 Continue fluids  Insulin drip with HHS protocol  Anion gap closed around 10 AM, received one dose of Levamir subq, will stop insulin drip after 2 hrs     • Hypertension    • Hypokalemia 05/27/2022    Will replace as needed. Will be cautious in the setting of ESRD to avoid need for emergency dialysis   Monitor Qtc intervals on EKG     • Hypomagnesemia 06/27/2021    Monitor and replace   • Morbid obesity ([facility])    • Nephrolithiasis    • On mechanically assisted ventilation ([facility]) 06/26/2022    Vent management and sedation orders placed.  - [facility] intensivist group consulted for vent management appreciate recommendations  - plan to extubate  today     • Peripheral vascular disease ([facility])    • Pleural effusion on right 06/26/2022    CXR on 6/30/22 read as a small upper left pulmonary edema vs early pneumonia.  Last Echo 1/2022 EF 61-65 % Continue to monitor  Procal slightly improved, CRP improved On Linezolid and meropenum      • PVD (peripheral vascular disease) ([facility])    • SIRS (systemic inflammatory response syndrome) ([facility]) 01/09/2022    Admission  - WBC 17.78   -   - RR 16 - 1/10: VSS/wnl - CXR - Mild pulm edema - Blood cultures no growth at 24 hours  - Procalcitonin 0.29 - UA : glucose 1000, negative Leucocytes/nitrate - Empiric Zosyn and Vancomcyin    • Sleep apnea    • Substance abuse ([facility])    • Vitamin D deficiency        Allergies   Allergen Reactions   • Adhesive Tape Hives, Other (See Comments) and Rash   • Nsaids Hives   • Latex Other (See Comments) and Hives   • Other Itching     Bandaids, MRSA, SURECLOSE   • Wound Dressing Adhesive Hives and Rash       Past Surgical History:   Procedure Laterality Date   • AMPUTATION DIGIT Right 2/27/2023    Procedure: Excision of right first metatarsal head, right second toe amputation;  Surgeon: Jean Oliveira DPM;  Location: [facility];  Service: Podiatry;  Laterality: Right;   • BELOW KNEE AMPUTATION Left 12/16/2022    Procedure: AMPUTATION BELOW KNEE, LEFT;  Surgeon: Huy White MD;  Location: [facility] OR;  Service: Orthopedics;  Laterality: Left;   • CARDIAC CATHETERIZATION N/A 07/14/2020   • CARDIAC CATHETERIZATION N/A 04/23/2021    Procedure: Left Heart Cath;  Surgeon: Melba Romo MD;  Location: [facility] CATH INVASIVE LOCATION;  Service: Cardiology;  Laterality: N/A;   • CARDIAC CATHETERIZATION N/A 04/30/2021    Procedure: Percutaneous Coronary Intervention;  Surgeon: Russell Voss MD;  Location: [facility] CATH INVASIVE LOCATION;  Service: Cardiovascular;  Laterality: N/A;   • CARDIAC CATHETERIZATION N/A 04/30/2021    Procedure: Stent NIKKI coronary;  Surgeon: Shanika  Russell ESPARZA MD;  Location: [facility] CATH INVASIVE LOCATION;  Service: Cardiovascular;  Laterality: N/A;   • CARDIAC CATHETERIZATION Left 11/13/2021    Procedure: Left Heart Cath;  Surgeon: Niall Rios MD;  Location: [facility] CATH INVASIVE LOCATION;  Service: Cardiology;  Laterality: Left;   • CAROTID STENT Left    • COLONOSCOPY     • COLONOSCOPY N/A 05/14/2021    Procedure: COLONOSCOPY;  Surgeon: Mingo Duarte MD;  Location: [facility] ENDOSCOPY;  Service: Gastroenterology;  Laterality: N/A;   • CORONARY ARTERY BYPASS GRAFT N/A 2013    CABG X 3   • CYSTOSCOPY BLADDER STONE LITHOTRIPSY Bilateral    • ENDOSCOPY N/A 04/12/2021    Procedure: ESOPHAGOGASTRODUODENOSCOPY;  Surgeon: Mingo Duarte MD;  Location: [facility] ENDOSCOPY;  Service: Gastroenterology;  Laterality: N/A;   • ENDOSCOPY N/A 05/14/2021    Procedure: ESOPHAGOGASTRODUODENOSCOPY;  Surgeon: Mingo Duarte MD;  Location: [facility] ENDOSCOPY;  Service: Gastroenterology;  Laterality: N/A;   • ENDOSCOPY N/A 01/28/2022    Procedure: ESOPHAGOGASTRODUODENOSCOPY;  Surgeon: Mingo Duarte MD;  Location: [facility] ENDOSCOPY;  Service: Gastroenterology;  Laterality: N/A;   • HYSTERECTOMY     • INTERVENTIONAL RADIOLOGY PROCEDURE N/A 10/21/2021    Procedure: tunneled central venous catheter placement;  Surgeon: Donnie Robles MD;  Location: [facility] ANGIO INVASIVE LOCATION;  Service: Interventional Radiology;  Laterality: N/A;   • INTERVENTIONAL RADIOLOGY PROCEDURE N/A 01/27/2022    Procedure: tunneled central venous catheter placement;  Surgeon: Donnie Robles MD;  Location: [facility] ANGIO INVASIVE LOCATION;  Service: Interventional Radiology;  Laterality: N/A;   • LAPAROSCOPIC TUBAL LIGATION     • TUNNELED VENOUS CATHETER PLACEMENT         Family History   Problem Relation Age of Onset   • Heart disease Mother    • Lung cancer Mother    • Heart disease Father    • Heart attack Father    • Diabetes Father    • Heart disease Half-Sister          Dad's side   • Heart disease Brother    • No Known Problems Sister    • No Known Problems Sister    • No Known Problems Sister    • No Known Problems Sister    • No Known Problems Sister    • Pancreatic cancer Half-Sister         Dad's side   • No Known Problems Brother    • No Known Problems Brother    • Heart attack Half-Brother    • Heart attack Half-Brother    • No Known Problems Maternal Grandmother    • No Known Problems Maternal Grandfather    • No Known Problems Paternal Grandmother    • No Known Problems Paternal Grandfather        Social History     Socioeconomic History   • Marital status: Legally      Spouse name: wesley dumont   Tobacco Use   • Smoking status: Every Day     Packs/day: 0.25     Years: 46.00     Pack years: 11.50     Types: Cigarettes     Start date: 2/1/1999   • Smokeless tobacco: Never   Vaping Use   • Vaping Use: Never used   Substance and Sexual Activity   • Alcohol use: No   • Drug use: Not Currently     Types: LSD, Marijuana, Methamphetamines   • Sexual activity: Defer           Objective   Physical Exam  Vitals and nursing note reviewed.   Constitutional:       General: She is not in acute distress.     Appearance: She is obese.   HENT:      Head: Normocephalic and atraumatic.      Nose: Nose normal.   Eyes:      General: No scleral icterus.  Cardiovascular:      Rate and Rhythm: Normal rate and regular rhythm.   Pulmonary:      Effort: Pulmonary effort is normal.      Breath sounds: Normal breath sounds.   Abdominal:      Tenderness: There is no abdominal tenderness.   Musculoskeletal:         General: Normal range of motion.      Comments: Postoperative wound on the inside of the right upper arm.  Palpable thrill present.  After removing the patient's pressure dressing there is no active bleeding and no significant wound dehiscence.   Skin:     General: Skin is warm and dry.   Neurological:      Mental Status: She is alert and oriented to person, place, and time.  "  Psychiatric:         Behavior: Behavior normal.         Procedures           ED Course  ED Course as of 03/14/23 2337   Tue Mar 14, 2023   1333 CBC & Differential(!)  The patient's hemoglobin is at her 2-month baseline. [JV]   1400 Comprehensive Metabolic Panel(!)  Chronic kidney disease.  Potassium in normal range. [JV]      ED Course User Index  [JV] Jf Adkins,                                            Medical Decision Making  The patient's recent past medical charts for this facility as well as outside facilities via the \"care everywhere\" application of epic reviewed.  The patient recently received health care regarding her kidney function at an outside facility.  She was last admitted to this facility in December of last year.  Since then she has received orthopedic/podiatry procedures.  Patient takes Plavix as a home medication.    The differential diagnosis includes anemia, puncture wound, coagulopathy, and postoperative bleeding, among others.    On final reevaluation the patient remains hemostatic.  She is agreeable to close follow-up with nephrology.  We discussed discharge instructions and reasons for early return to the emergency department.      Bleeding pseudoaneurysm of right brachiocephalic arteriovenous fistula (HCC): acute illness or injury  Amount and/or Complexity of Data Reviewed  Labs: ordered. Decision-making details documented in ED Course.  Discussion of management or test interpretation with external provider(s): Case discussed with Dr. Lauren from the nephrology service who also personally evaluated the patient in the emergency department.  He feels that they can arrange for dialysis this afternoon should the patient's labs not reveal a reason for admission or emergent dialysis as an inpatient.    Risk  OTC drugs.  Prescription drug management.          Final diagnoses:   Bleeding pseudoaneurysm of right brachiocephalic arteriovenous fistula (HCC)       ED Disposition  ED " Disposition     ED Disposition   Discharge    Condition   Stable    Comment   --             Kiersten Wilson, APRN  [address] [place] [address]  [phone]    Call today  To make an appointment for a wound check in 3 days.    Ace Lauren MD  [facility] [place] [address] [phone]    Call today  To make an appointment to schedule hemodialysis for this afternoon.         Medication List      Stop    Jf Haider,   03/14/23 0360

## 2023-03-15 ENCOUNTER — HOSPITAL ENCOUNTER (OUTPATIENT)
Facility: HOSPITAL | Age: 63
Setting detail: OBSERVATION
Discharge: NURSING, REHAB OR LONG-TERM CARE | End: 2023-03-16
Attending: EMERGENCY MEDICINE | Admitting: FAMILY MEDICINE
Payer: MEDICARE

## 2023-03-15 ENCOUNTER — APPOINTMENT (OUTPATIENT)
Dept: GENERAL RADIOLOGY | Facility: HOSPITAL | Age: 63
End: 2023-03-15
Payer: MEDICARE

## 2023-03-15 DIAGNOSIS — Z74.09 IMPAIRED FUNCTIONAL MOBILITY, BALANCE, GAIT, AND ENDURANCE: ICD-10-CM

## 2023-03-15 DIAGNOSIS — J96.01 ACUTE RESPIRATORY FAILURE WITH HYPOXIA: Primary | ICD-10-CM

## 2023-03-15 DIAGNOSIS — N18.6 ESRD (END STAGE RENAL DISEASE) ON DIALYSIS: ICD-10-CM

## 2023-03-15 DIAGNOSIS — T81.89XA DRAINING POSTOPERATIVE WOUND, INITIAL ENCOUNTER: ICD-10-CM

## 2023-03-15 DIAGNOSIS — Z99.2 ESRD (END STAGE RENAL DISEASE) ON DIALYSIS: ICD-10-CM

## 2023-03-15 DIAGNOSIS — Z78.9 IMPAIRED MOBILITY AND ADLS: ICD-10-CM

## 2023-03-15 DIAGNOSIS — Z74.09 IMPAIRED MOBILITY AND ADLS: ICD-10-CM

## 2023-03-15 DIAGNOSIS — Z89.512 S/P BKA (BELOW KNEE AMPUTATION), LEFT: ICD-10-CM

## 2023-03-15 LAB
ANION GAP SERPL CALCULATED.3IONS-SCNC: 13 MMOL/L (ref 5–15)
BASOPHILS # BLD AUTO: 0.07 10*3/MM3 (ref 0–0.2)
BASOPHILS NFR BLD AUTO: 0.7 % (ref 0–1.5)
BUN SERPL-MCNC: 74 MG/DL (ref 8–23)
BUN/CREAT SERPL: 15.4 (ref 7–25)
CALCIUM SPEC-SCNC: 10 MG/DL (ref 8.6–10.5)
CHLORIDE SERPL-SCNC: 100 MMOL/L (ref 98–107)
CO2 SERPL-SCNC: 22 MMOL/L (ref 22–29)
CREAT SERPL-MCNC: 4.8 MG/DL (ref 0.57–1)
DEPRECATED RDW RBC AUTO: 52.7 FL (ref 37–54)
EGFRCR SERPLBLD CKD-EPI 2021: 9.6 ML/MIN/1.73
EOSINOPHIL # BLD AUTO: 0.29 10*3/MM3 (ref 0–0.4)
EOSINOPHIL NFR BLD AUTO: 2.9 % (ref 0.3–6.2)
ERYTHROCYTE [DISTWIDTH] IN BLOOD BY AUTOMATED COUNT: 16.1 % (ref 12.3–15.4)
GLUCOSE BLDC GLUCOMTR-MCNC: 99 MG/DL (ref 70–130)
GLUCOSE SERPL-MCNC: 102 MG/DL (ref 65–99)
HCT VFR BLD AUTO: 32.7 % (ref 34–46.6)
HGB BLD-MCNC: 10.3 G/DL (ref 12–15.9)
IMM GRANULOCYTES # BLD AUTO: 0.09 10*3/MM3 (ref 0–0.05)
IMM GRANULOCYTES NFR BLD AUTO: 0.9 % (ref 0–0.5)
LYMPHOCYTES # BLD AUTO: 2.25 10*3/MM3 (ref 0.7–3.1)
LYMPHOCYTES NFR BLD AUTO: 22.6 % (ref 19.6–45.3)
MCH RBC QN AUTO: 28.5 PG (ref 26.6–33)
MCHC RBC AUTO-ENTMCNC: 31.5 G/DL (ref 31.5–35.7)
MCV RBC AUTO: 90.3 FL (ref 79–97)
MONOCYTES # BLD AUTO: 0.56 10*3/MM3 (ref 0.1–0.9)
MONOCYTES NFR BLD AUTO: 5.6 % (ref 5–12)
NEUTROPHILS NFR BLD AUTO: 6.69 10*3/MM3 (ref 1.7–7)
NEUTROPHILS NFR BLD AUTO: 67.3 % (ref 42.7–76)
NRBC BLD AUTO-RTO: 0 /100 WBC (ref 0–0.2)
PLATELET # BLD AUTO: 311 10*3/MM3 (ref 140–450)
PMV BLD AUTO: 8.4 FL (ref 6–12)
POTASSIUM SERPL-SCNC: 4 MMOL/L (ref 3.5–5.2)
RBC # BLD AUTO: 3.62 10*6/MM3 (ref 3.77–5.28)
SODIUM SERPL-SCNC: 135 MMOL/L (ref 136–145)
WBC NRBC COR # BLD: 9.95 10*3/MM3 (ref 3.4–10.8)

## 2023-03-15 PROCEDURE — 25010000002 HEPARIN (PORCINE) PER 1000 UNITS: Performed by: INTERNAL MEDICINE

## 2023-03-15 PROCEDURE — 99284 EMERGENCY DEPT VISIT MOD MDM: CPT

## 2023-03-15 PROCEDURE — G0378 HOSPITAL OBSERVATION PER HR: HCPCS

## 2023-03-15 PROCEDURE — 80048 BASIC METABOLIC PNL TOTAL CA: CPT | Performed by: EMERGENCY MEDICINE

## 2023-03-15 PROCEDURE — 25010000002 HEPARIN (PORCINE) PER 1000 UNITS: Performed by: FAMILY MEDICINE

## 2023-03-15 PROCEDURE — G0257 UNSCHED DIALYSIS ESRD PT HOS: HCPCS

## 2023-03-15 PROCEDURE — 85025 COMPLETE CBC W/AUTO DIFF WBC: CPT | Performed by: EMERGENCY MEDICINE

## 2023-03-15 PROCEDURE — 96372 THER/PROPH/DIAG INJ SC/IM: CPT

## 2023-03-15 PROCEDURE — 82962 GLUCOSE BLOOD TEST: CPT

## 2023-03-15 PROCEDURE — 71045 X-RAY EXAM CHEST 1 VIEW: CPT

## 2023-03-15 PROCEDURE — 36415 COLL VENOUS BLD VENIPUNCTURE: CPT

## 2023-03-15 RX ORDER — ONDANSETRON 2 MG/ML
4 INJECTION INTRAMUSCULAR; INTRAVENOUS EVERY 6 HOURS PRN
Status: DISCONTINUED | OUTPATIENT
Start: 2023-03-15 | End: 2023-03-16 | Stop reason: HOSPADM

## 2023-03-15 RX ORDER — AMOXICILLIN 250 MG
2 CAPSULE ORAL 2 TIMES DAILY PRN
Status: DISCONTINUED | OUTPATIENT
Start: 2023-03-15 | End: 2023-03-16 | Stop reason: HOSPADM

## 2023-03-15 RX ORDER — NICOTINE POLACRILEX 4 MG
15 LOZENGE BUCCAL
Status: DISCONTINUED | OUTPATIENT
Start: 2023-03-15 | End: 2023-03-16 | Stop reason: HOSPADM

## 2023-03-15 RX ORDER — SODIUM CHLORIDE 9 MG/ML
40 INJECTION, SOLUTION INTRAVENOUS AS NEEDED
Status: DISCONTINUED | OUTPATIENT
Start: 2023-03-15 | End: 2023-03-16 | Stop reason: HOSPADM

## 2023-03-15 RX ORDER — GABAPENTIN 300 MG/1
300 CAPSULE ORAL DAILY
Status: DISCONTINUED | OUTPATIENT
Start: 2023-03-15 | End: 2023-03-16 | Stop reason: HOSPADM

## 2023-03-15 RX ORDER — NALOXONE HCL 0.4 MG/ML
0.4 VIAL (ML) INJECTION
Status: DISCONTINUED | OUTPATIENT
Start: 2023-03-15 | End: 2023-03-16 | Stop reason: HOSPADM

## 2023-03-15 RX ORDER — INSULIN ASPART 100 [IU]/ML
0-14 INJECTION, SOLUTION INTRAVENOUS; SUBCUTANEOUS
Status: DISCONTINUED | OUTPATIENT
Start: 2023-03-15 | End: 2023-03-16 | Stop reason: HOSPADM

## 2023-03-15 RX ORDER — MORPHINE SULFATE 2 MG/ML
1 INJECTION, SOLUTION INTRAMUSCULAR; INTRAVENOUS EVERY 4 HOURS PRN
Status: DISCONTINUED | OUTPATIENT
Start: 2023-03-15 | End: 2023-03-16 | Stop reason: HOSPADM

## 2023-03-15 RX ORDER — SODIUM CHLORIDE 0.9 % (FLUSH) 0.9 %
10 SYRINGE (ML) INJECTION AS NEEDED
Status: DISCONTINUED | OUTPATIENT
Start: 2023-03-15 | End: 2023-03-16 | Stop reason: HOSPADM

## 2023-03-15 RX ORDER — ACETAMINOPHEN 650 MG/1
650 SUPPOSITORY RECTAL EVERY 4 HOURS PRN
Status: DISCONTINUED | OUTPATIENT
Start: 2023-03-15 | End: 2023-03-16 | Stop reason: HOSPADM

## 2023-03-15 RX ORDER — SEVELAMER CARBONATE 800 MG/1
800 TABLET, FILM COATED ORAL
Status: DISCONTINUED | OUTPATIENT
Start: 2023-03-15 | End: 2023-03-16 | Stop reason: HOSPADM

## 2023-03-15 RX ORDER — ACETAMINOPHEN 160 MG/5ML
650 SOLUTION ORAL EVERY 4 HOURS PRN
Status: DISCONTINUED | OUTPATIENT
Start: 2023-03-15 | End: 2023-03-16 | Stop reason: HOSPADM

## 2023-03-15 RX ORDER — HEPARIN SODIUM 1000 [USP'U]/ML
4000 INJECTION, SOLUTION INTRAVENOUS; SUBCUTANEOUS AS NEEDED
Status: DISCONTINUED | OUTPATIENT
Start: 2023-03-15 | End: 2023-03-16 | Stop reason: HOSPADM

## 2023-03-15 RX ORDER — PANTOPRAZOLE SODIUM 40 MG/1
40 TABLET, DELAYED RELEASE ORAL NIGHTLY
Status: DISCONTINUED | OUTPATIENT
Start: 2023-03-15 | End: 2023-03-16 | Stop reason: HOSPADM

## 2023-03-15 RX ORDER — HEPARIN SODIUM 5000 [USP'U]/ML
5000 INJECTION, SOLUTION INTRAVENOUS; SUBCUTANEOUS EVERY 12 HOURS SCHEDULED
Status: DISCONTINUED | OUTPATIENT
Start: 2023-03-15 | End: 2023-03-16 | Stop reason: HOSPADM

## 2023-03-15 RX ORDER — SODIUM CHLORIDE 0.9 % (FLUSH) 0.9 %
10 SYRINGE (ML) INJECTION EVERY 12 HOURS SCHEDULED
Status: DISCONTINUED | OUTPATIENT
Start: 2023-03-15 | End: 2023-03-16 | Stop reason: HOSPADM

## 2023-03-15 RX ORDER — IPRATROPIUM BROMIDE AND ALBUTEROL SULFATE 2.5; .5 MG/3ML; MG/3ML
3 SOLUTION RESPIRATORY (INHALATION) 4 TIMES DAILY PRN
Status: DISCONTINUED | OUTPATIENT
Start: 2023-03-15 | End: 2023-03-16 | Stop reason: HOSPADM

## 2023-03-15 RX ORDER — ALBUTEROL SULFATE 2.5 MG/3ML
2.5 SOLUTION RESPIRATORY (INHALATION) EVERY 4 HOURS PRN
Status: DISCONTINUED | OUTPATIENT
Start: 2023-03-15 | End: 2023-03-16 | Stop reason: HOSPADM

## 2023-03-15 RX ORDER — POLYETHYLENE GLYCOL 3350 17 G/17G
17 POWDER, FOR SOLUTION ORAL DAILY PRN
Status: DISCONTINUED | OUTPATIENT
Start: 2023-03-15 | End: 2023-03-16 | Stop reason: HOSPADM

## 2023-03-15 RX ORDER — FUROSEMIDE 20 MG/1
20 TABLET ORAL DAILY
COMMUNITY

## 2023-03-15 RX ORDER — CHOLECALCIFEROL (VITAMIN D3) 125 MCG
5 CAPSULE ORAL NIGHTLY PRN
Status: DISCONTINUED | OUTPATIENT
Start: 2023-03-15 | End: 2023-03-16 | Stop reason: HOSPADM

## 2023-03-15 RX ORDER — ATORVASTATIN CALCIUM 40 MG/1
40 TABLET, FILM COATED ORAL DAILY
COMMUNITY

## 2023-03-15 RX ORDER — CARVEDILOL 6.25 MG/1
6.25 TABLET ORAL 2 TIMES DAILY WITH MEALS
Status: DISCONTINUED | OUTPATIENT
Start: 2023-03-15 | End: 2023-03-16 | Stop reason: HOSPADM

## 2023-03-15 RX ORDER — DULOXETIN HYDROCHLORIDE 20 MG/1
20 CAPSULE, DELAYED RELEASE ORAL DAILY
Status: DISCONTINUED | OUTPATIENT
Start: 2023-03-15 | End: 2023-03-16 | Stop reason: HOSPADM

## 2023-03-15 RX ORDER — LOSARTAN POTASSIUM 25 MG/1
25 TABLET ORAL DAILY
Status: DISCONTINUED | OUTPATIENT
Start: 2023-03-15 | End: 2023-03-16 | Stop reason: HOSPADM

## 2023-03-15 RX ORDER — NITROGLYCERIN 0.4 MG/1
0.4 TABLET SUBLINGUAL
Status: DISCONTINUED | OUTPATIENT
Start: 2023-03-15 | End: 2023-03-16 | Stop reason: HOSPADM

## 2023-03-15 RX ORDER — OXYCODONE HYDROCHLORIDE 10 MG/1
10 TABLET ORAL 4 TIMES DAILY PRN
Status: DISCONTINUED | OUTPATIENT
Start: 2023-03-15 | End: 2023-03-16 | Stop reason: HOSPADM

## 2023-03-15 RX ORDER — CYCLOBENZAPRINE HCL 5 MG
5 TABLET ORAL 2 TIMES DAILY
Status: DISCONTINUED | OUTPATIENT
Start: 2023-03-15 | End: 2023-03-16 | Stop reason: HOSPADM

## 2023-03-15 RX ORDER — CALCIUM CARBONATE 200(500)MG
2 TABLET,CHEWABLE ORAL 2 TIMES DAILY PRN
Status: DISCONTINUED | OUTPATIENT
Start: 2023-03-15 | End: 2023-03-16 | Stop reason: HOSPADM

## 2023-03-15 RX ORDER — MEMANTINE HYDROCHLORIDE 5 MG/1
5 TABLET ORAL 2 TIMES DAILY
Status: DISCONTINUED | OUTPATIENT
Start: 2023-03-15 | End: 2023-03-16 | Stop reason: HOSPADM

## 2023-03-15 RX ORDER — FUROSEMIDE 20 MG/1
20 TABLET ORAL DAILY
Status: DISCONTINUED | OUTPATIENT
Start: 2023-03-15 | End: 2023-03-16 | Stop reason: HOSPADM

## 2023-03-15 RX ORDER — DOCUSATE SODIUM 100 MG/1
100 CAPSULE, LIQUID FILLED ORAL 2 TIMES DAILY
Status: DISCONTINUED | OUTPATIENT
Start: 2023-03-15 | End: 2023-03-16 | Stop reason: HOSPADM

## 2023-03-15 RX ORDER — BISACODYL 5 MG/1
5 TABLET, DELAYED RELEASE ORAL DAILY PRN
Status: DISCONTINUED | OUTPATIENT
Start: 2023-03-15 | End: 2023-03-16 | Stop reason: HOSPADM

## 2023-03-15 RX ORDER — MONTELUKAST SODIUM 10 MG/1
10 TABLET ORAL NIGHTLY
Status: DISCONTINUED | OUTPATIENT
Start: 2023-03-15 | End: 2023-03-16 | Stop reason: HOSPADM

## 2023-03-15 RX ORDER — ONDANSETRON 4 MG/1
4 TABLET, FILM COATED ORAL EVERY 6 HOURS PRN
Status: DISCONTINUED | OUTPATIENT
Start: 2023-03-15 | End: 2023-03-16 | Stop reason: HOSPADM

## 2023-03-15 RX ORDER — ATORVASTATIN CALCIUM 40 MG/1
40 TABLET, FILM COATED ORAL DAILY
Status: DISCONTINUED | OUTPATIENT
Start: 2023-03-15 | End: 2023-03-16 | Stop reason: HOSPADM

## 2023-03-15 RX ORDER — HYDROXYZINE HYDROCHLORIDE 25 MG/1
25 TABLET, FILM COATED ORAL EVERY 8 HOURS PRN
Status: DISCONTINUED | OUTPATIENT
Start: 2023-03-15 | End: 2023-03-16 | Stop reason: HOSPADM

## 2023-03-15 RX ORDER — ACETAMINOPHEN 325 MG/1
650 TABLET ORAL EVERY 4 HOURS PRN
Status: DISCONTINUED | OUTPATIENT
Start: 2023-03-15 | End: 2023-03-16 | Stop reason: HOSPADM

## 2023-03-15 RX ORDER — DEXTROSE MONOHYDRATE 25 G/50ML
25 INJECTION, SOLUTION INTRAVENOUS
Status: DISCONTINUED | OUTPATIENT
Start: 2023-03-15 | End: 2023-03-16 | Stop reason: HOSPADM

## 2023-03-15 RX ORDER — CLOPIDOGREL BISULFATE 75 MG/1
75 TABLET ORAL DAILY
Status: DISCONTINUED | OUTPATIENT
Start: 2023-03-15 | End: 2023-03-16 | Stop reason: HOSPADM

## 2023-03-15 RX ORDER — BISACODYL 10 MG
10 SUPPOSITORY, RECTAL RECTAL DAILY PRN
Status: DISCONTINUED | OUTPATIENT
Start: 2023-03-15 | End: 2023-03-16 | Stop reason: HOSPADM

## 2023-03-15 RX ADMIN — MEMANTINE 5 MG: 5 TABLET ORAL at 21:30

## 2023-03-15 RX ADMIN — SEVELAMER CARBONATE 800 MG: 800 TABLET, FILM COATED ORAL at 21:29

## 2023-03-15 RX ADMIN — HEPARIN SODIUM 4000 UNITS: 1000 INJECTION INTRAVENOUS; SUBCUTANEOUS at 18:06

## 2023-03-15 RX ADMIN — CARVEDILOL 6.25 MG: 6.25 TABLET, FILM COATED ORAL at 21:30

## 2023-03-15 RX ADMIN — CYCLOBENZAPRINE 5 MG: 5 TABLET, FILM COATED ORAL at 21:29

## 2023-03-15 RX ADMIN — ATORVASTATIN CALCIUM 40 MG: 40 TABLET, FILM COATED ORAL at 21:30

## 2023-03-15 RX ADMIN — Medication 10 ML: at 21:30

## 2023-03-15 RX ADMIN — PANTOPRAZOLE SODIUM 40 MG: 40 TABLET, DELAYED RELEASE ORAL at 21:29

## 2023-03-15 RX ADMIN — MONTELUKAST 10 MG: 10 TABLET, FILM COATED ORAL at 21:29

## 2023-03-15 RX ADMIN — DULOXETINE HYDROCHLORIDE 20 MG: 20 CAPSULE, DELAYED RELEASE ORAL at 21:34

## 2023-03-15 RX ADMIN — FUROSEMIDE 20 MG: 20 TABLET ORAL at 21:34

## 2023-03-15 RX ADMIN — LOSARTAN POTASSIUM 25 MG: 25 TABLET, FILM COATED ORAL at 21:34

## 2023-03-15 RX ADMIN — HEPARIN SODIUM 5000 UNITS: 5000 INJECTION INTRAVENOUS; SUBCUTANEOUS at 21:29

## 2023-03-15 RX ADMIN — GABAPENTIN 300 MG: 300 CAPSULE ORAL at 21:34

## 2023-03-15 RX ADMIN — OXYCODONE HYDROCHLORIDE 10 MG: 10 TABLET ORAL at 21:43

## 2023-03-15 RX ADMIN — NICOTINE 1 PATCH: 7 PATCH, EXTENDED RELEASE TRANSDERMAL at 21:29

## 2023-03-15 RX ADMIN — DOCUSATE SODIUM 100 MG: 100 CAPSULE, LIQUID FILLED ORAL at 21:29

## 2023-03-15 RX ADMIN — CLOPIDOGREL BISULFATE 75 MG: 75 TABLET ORAL at 21:34

## 2023-03-15 NOTE — CONSULTS
[facility]  [address] [place] [address]. [address]  T - [phone]  F - [phone]     Consultation         PATIENT  DEMOGRAPHICS   PATIENT NAME: Yamileth Sylvester Bryon                      PHYSICIAN: BRIDGETT Hadley  : 1960  MRN: [id number]    Subjective   SUBJECTIVE   Referring Provider: Dr. Adkins  Reason for Consultation: ESRD on HD  History of present illness: This is a 63-year-old female with a past medical history significant for ESRD on hemodialysis 2021, anemia of CKD, DM 2, CAD, hypertension who presented to the hospital again today.  She was sent to the hospital yesterday with bleeding from her AV fistula site.  On presentation to ER bleeding had subsided and she was evaluated and discharged with plan for outpatient dialysis today.  Unfortunately today she returned to the ER for concern for bleeding.  She is being admitted to the hospital and nephrology is consulted for management of her hemodialysis needs.    Past Medical History:   Diagnosis Date   • Acute blood loss anemia 2017    Likely due to gastric oozing at this time. - Dr. Duarte (GI) was consulted and has now signed off, will follow up outpatient - pill colonoscopy showed AVMs - continue to monitor   • Acute cystitis with hematuria 2021: IV Rocephin 1 gm q 24  : transitioned  to omnicef 300mg. Urine cultures resulted and did not show growth. Omnicef discontinued as patient is asymptomatic   • Altered mental status 2022    - AMS on presentation - initial ABG pH 7.3, CO2 34 - Procal 0.29 - UA negative for acute cysitits -CTA head wnl  - Empiric Zosyn and Vancomycin -Lactate 2.5 on admission  - blood cultures no growth at 24 hours     • Anxiety    • CAD (coronary artery disease) 2021    S/P 3 stents 2021 for [facility]HL Continue ASA 81mg & Clopidogrel 75mg Continue Atorvastatin 40mg   • Carotid artery stenosis    • CHF (congestive heart failure) ([facility])    • Chronic obstructive  lung disease ([facility])    • CKD (chronic kidney disease) stage 4, GFR 15-29 ml/min ([facility])    • CKD (chronic kidney disease), symptom management only, stage 5 ([facility]) 10/05/2020    Results from last 7 days Lab Units 12/15/21 0548 12/14/21 1323 12/14/21 0916 CREATININE mg/dL 3.92* 3.21* 3.32*  Baseline creatinine 2-3 GFR 13-25 GFR 15 Dialysis MWF, sees Dr. Lauren Nephrology consult,, appreciate recommendations Continue Bumex 1mg bid daily Holding Bumex 2mg 4 times a week   • Colonic polyp    • Coronary arteriosclerosis    • Diabetes mellitus ([facility])    • Diabetic neuropathy ([facility])    • Ear pain, right 10/18/2021    - canal trauma due to patient scratching and DMT2 - added cortisporin ear drops   • Elevated troponin 10/12/2021    -most likely from CKD -Trending down -Neg chest pain   • Generalized abdominal pain 07/01/2022    Could be due to initiation of tube feeds vs dyspepsia vs abdominal cramps related to no PO intake due to intubation vs constipation Continue current laxative regiment  If no bowel movement by this afternoon will consider enema   • GERD (gastroesophageal reflux disease)    • GI bleed 05/13/2021    - GI will follow up outpatient - Protonix 40mg daily - Avoid medical DVT prophy and use mechanical at this time instead. - Continue to monitor - pill colonoscopy results showed AVMs   • History of transfusion    • Hypercholesterolemia    • Hyperosmolar hyperglycemic state (HHS) ([facility]) 06/25/2022    Serum glucose 605 on admission  Anion gap 16 PH 7.37 Bicarb 27.9 Continue fluids  Insulin drip with HHS protocol  Anion gap closed around 10 AM, received one dose of Levamir subq, will stop insulin drip after 2 hrs     • Hypertension    • Hypokalemia 05/27/2022    Will replace as needed. Will be cautious in the setting of ESRD to avoid need for emergency dialysis   Monitor Qtc intervals on EKG     • Hypomagnesemia 06/27/2021    Monitor and replace   • Morbid obesity ([facility])    • Nephrolithiasis    • On mechanically assisted  ventilation ([facility]) 06/26/2022    Vent management and sedation orders placed.  - [facility] intensivist group consulted for vent management appreciate recommendations  - plan to extubate today     • Peripheral vascular disease ([facility])    • Pleural effusion on right 06/26/2022    CXR on 6/30/22 read as a small upper left pulmonary edema vs early pneumonia.  Last Echo 1/2022 EF 61-65 % Continue to monitor  Procal slightly improved, CRP improved On Linezolid and meropenum      • PVD (peripheral vascular disease) ([facility])    • SIRS (systemic inflammatory response syndrome) ([facility]) 01/09/2022    Admission  - WBC 17.78   -   - RR 16 - 1/10: VSS/wnl - CXR - Mild pulm edema - Blood cultures no growth at 24 hours  - Procalcitonin 0.29 - UA : glucose 1000, negative Leucocytes/nitrate - Empiric Zosyn and Vancomcyin    • Sleep apnea    • Substance abuse ([facility])    • Vitamin D deficiency      Past Surgical History:   Procedure Laterality Date   • AMPUTATION DIGIT Right 2/27/2023    Procedure: Excision of right first metatarsal head, right second toe amputation;  Surgeon: Jean Oliveira DPM;  [facility];  Service: Podiatry;  Laterality: Right;   • BELOW KNEE AMPUTATION Left 12/16/2022    Procedure: AMPUTATION BELOW KNEE, LEFT;  Surgeon: Huy White MD;  Location: [facility] OR;  Service: Orthopedics;  Laterality: Left;   • CARDIAC CATHETERIZATION N/A 07/14/2020   • CARDIAC CATHETERIZATION N/A 04/23/2021    Procedure: Left Heart Cath;  Surgeon: Melba Romo MD;  Location: [facility] CATH INVASIVE LOCATION;  Service: Cardiology;  Laterality: N/A;   • CARDIAC CATHETERIZATION N/A 04/30/2021    Procedure: Percutaneous Coronary Intervention;  Surgeon: Russell Voss MD;  Location: [facility] CATH INVASIVE LOCATION;  Service: Cardiovascular;  Laterality: N/A;   • CARDIAC CATHETERIZATION N/A 04/30/2021    Procedure: Stent NKIKI coronary;  Surgeon: Russell Voss MD;  Location: [facility] CATH INVASIVE  LOCATION;  Service: Cardiovascular;  Laterality: N/A;   • CARDIAC CATHETERIZATION Left 11/13/2021    Procedure: Left Heart Cath;  Surgeon: Niall Rios MD;  Location: [facility] CATH INVASIVE LOCATION;  Service: Cardiology;  Laterality: Left;   • CAROTID STENT Left    • COLONOSCOPY     • COLONOSCOPY N/A 05/14/2021    Procedure: COLONOSCOPY;  Surgeon: Mingo Duarte MD;  Location: [facility] ENDOSCOPY;  Service: Gastroenterology;  Laterality: N/A;   • CORONARY ARTERY BYPASS GRAFT N/A 2013    CABG X 3   • CYSTOSCOPY BLADDER STONE LITHOTRIPSY Bilateral    • ENDOSCOPY N/A 04/12/2021    Procedure: ESOPHAGOGASTRODUODENOSCOPY;  Surgeon: Mingo Duarte MD;  Location: [facility] ENDOSCOPY;  Service: Gastroenterology;  Laterality: N/A;   • ENDOSCOPY N/A 05/14/2021    Procedure: ESOPHAGOGASTRODUODENOSCOPY;  Surgeon: Mingo Duarte MD;  Location: [facility] ENDOSCOPY;  Service: Gastroenterology;  Laterality: N/A;   • ENDOSCOPY N/A 01/28/2022    Procedure: ESOPHAGOGASTRODUODENOSCOPY;  Surgeon: Mingo Duarte MD;  Location: [facility] ENDOSCOPY;  Service: Gastroenterology;  Laterality: N/A;   • HYSTERECTOMY     • INTERVENTIONAL RADIOLOGY PROCEDURE N/A 10/21/2021    Procedure: tunneled central venous catheter placement;  Surgeon: Donnie Robles MD;  Location: [facility] ANGIO INVASIVE LOCATION;  Service: Interventional Radiology;  Laterality: N/A;   • INTERVENTIONAL RADIOLOGY PROCEDURE N/A 01/27/2022    Procedure: tunneled central venous catheter placement;  Surgeon: Donnie Robles MD;  Location: [facility] ANGIO INVASIVE LOCATION;  Service: Interventional Radiology;  Laterality: N/A;   • LAPAROSCOPIC TUBAL LIGATION     • TUNNELED VENOUS CATHETER PLACEMENT       Family History   Problem Relation Age of Onset   • Heart disease Mother    • Lung cancer Mother    • Heart disease Father    • Heart attack Father    • Diabetes Father    • Heart disease Half-Sister         Dad's side   • Heart disease Brother    • No Known  Problems Sister    • No Known Problems Sister    • No Known Problems Sister    • No Known Problems Sister    • No Known Problems Sister    • Pancreatic cancer Half-Sister         Dad's side   • No Known Problems Brother    • No Known Problems Brother    • Heart attack Half-Brother    • Heart attack Half-Brother    • No Known Problems Maternal Grandmother    • No Known Problems Maternal Grandfather    • No Known Problems Paternal Grandmother    • No Known Problems Paternal Grandfather      Social History     Tobacco Use   • Smoking status: Every Day     Packs/day: 0.25     Years: 46.00     Pack years: 11.50     Types: Cigarettes     Start date: 2/1/1999   • Smokeless tobacco: Never   Vaping Use   • Vaping Use: Never used   Substance Use Topics   • Alcohol use: No   • Drug use: Not Currently     Types: LSD, Marijuana, Methamphetamines     Allergies:  Adhesive tape, Nsaids, Latex, Other, and Wound dressing adhesive     REVIEW OF SYSTEMS    Review of Systems   All other systems reviewed and are negative.      Objective   OBJECTIVE   Vital Signs  Temp:  [97.8 °F (36.6 °C)] 97.8 °F (36.6 °C)  Heart Rate:  [64-66] 64  Resp:  [20-22] 20  BP: (154-162)/(70) 154/70         No intake/output data recorded.    PHYSICAL EXAM    Physical Exam  Constitutional:       Appearance: She is well-developed.   HENT:      Head: Normocephalic and atraumatic.   Eyes:      Conjunctiva/sclera: Conjunctivae normal.      Pupils: Pupils are equal, round, and reactive to light.   Cardiovascular:      Rate and Rhythm: Normal rate and regular rhythm.   Pulmonary:      Effort: Pulmonary effort is normal.      Breath sounds: Normal breath sounds.   Abdominal:      Palpations: Abdomen is soft.   Musculoskeletal:      Cervical back: Neck supple.      Right lower leg: Edema present.      Left lower leg: Edema present.   Skin:     General: Skin is warm and dry.   Neurological:      Mental Status: She is alert and oriented to person, place, and time.    Psychiatric:         Mood and Affect: Mood normal.         Behavior: Behavior normal.         RESULTS   Results Review:    Results from last 7 days   Lab Units 03/14/23  1308   SODIUM mmol/L 134*   POTASSIUM mmol/L 4.2   CHLORIDE mmol/L 98   CO2 mmol/L 21.0*   BUN mg/dL 66*   CREATININE mg/dL 4.68*   CALCIUM mg/dL 9.9   BILIRUBIN mg/dL 0.2   ALK PHOS U/L 255*   ALT (SGPT) U/L <5   AST (SGOT) U/L 10   GLUCOSE mg/dL 177*       Estimated Creatinine Clearance: 14.1 mL/min (A) (by C-G formula based on SCr of 4.68 mg/dL (H)).                Results from last 7 days   Lab Units 03/14/23  1308   WBC 10*3/mm3 11.98*   HEMOGLOBIN g/dL 10.8*   PLATELETS 10*3/mm3 313              MEDICATIONS         Medications Prior to Admission   Medication Sig Dispense Refill Last Dose   • acetaminophen (Tylenol 8 Hour) 650 MG 8 hr tablet Take 1 tablet by mouth Every 8 (Eight) Hours As Needed for Mild Pain . 90 tablet 0    • albuterol (PROVENTIL) (2.5 MG/3ML) 0.083% nebulizer solution Inhale the contents of 1 vial by nebulization Every 4 (Four) Hours As Needed for Wheezing. 75 mL 3    • albuterol sulfate  (90 Base) MCG/ACT inhaler Inhale 2 puffs Every 4 (Four) Hours As Needed for Wheezing. 18 g 1    • atorvastatin (LIPITOR) 40 MG tablet Take 1 tablet by mouth Daily.      • Blood Glucose Monitoring Suppl ([employer] Blood Glucose Meter) w/Device kit 1 each 3 (Three) Times a Day. 1 kit 3    • carvedilol (COREG) 6.25 MG tablet Take 1 tablet by mouth 2 (Two) Times a Day With Meals.      • Cetirizine HCl 10 MG capsule Take 1 capsule by mouth Daily.      • clopidogrel (PLAVIX) 75 MG tablet Take 1 tablet by mouth Daily. 30 tablet 5    • cyclobenzaprine (FLEXERIL) 5 MG tablet Take 1 tablet by mouth 2 (Two) Times a Day.      • Diclofenac Sodium (VOLTAREN) 1 % gel gel Apply 4 g topically to the appropriate area as directed 4 (Four) Times a Day As Needed (Ankle pain). 350 g 2    • docusate sodium (COLACE) 100 MG capsule Take 100 mg by mouth 2 (Two)  "Times a Day.      • DULoxetine (CYMBALTA) 20 MG capsule Take 20 mg by mouth Daily. Indications: Disease of the Peripheral Nerves      • Easy Touch Insulin Syringe 30G X 5/16\" 0.5 ML misc USE AS DIRECTED WITH LEVEMIR      • EASY TOUCH PEN NEEDLES 31G X 8 MM misc       • furosemide (LASIX) 20 MG tablet Take 1 tablet by mouth Daily.      • gabapentin (NEURONTIN) 300 MG capsule Take 1 capsule by mouth Daily. 3 capsule 0    • hydrOXYzine (ATARAX) 25 MG tablet Take 25 mg by mouth Every 8 (Eight) Hours As Needed for Itching.      • Insulin Lispro, 1 Unit Dial, (HUMALOG) 100 UNIT/ML solution pen-injector Inject 14-35 Units under the skin into the appropriate area as directed 3 (Three) Times a Day With Meals. Takes 14 units with meals plus sliding scale  Sliding scale:   150-199 take 4 units  200-249 take 8 units  250-299 take 12 units  300-349 take 16 units  350-400 take 20 units  greater than 400, call physician      • Insulin Pen Needle (NovoFine) 30G X 8 MM misc As directed 4 times daily 100 each 11    • Insulin Pen Needle 31G X 8 MM misc Use to inject insulin 4 (Four) Times a Day as directed. 120 each 12    • ipratropium-albuterol (DUO-NEB) 0.5-2.5 mg/3 ml nebulizer Take 3 mL by nebulization 4 (Four) Times a Day As Needed.      • losartan (COZAAR) 25 MG tablet Take 1 tablet by mouth Daily.      • memantine (NAMENDA) 5 MG tablet Take 5 mg by mouth 2 (Two) Times a Day.      • montelukast (SINGULAIR) 10 MG tablet Take 1 tablet by mouth Every Night. 90 tablet 0    • nitroglycerin (NITROSTAT) 0.4 MG SL tablet Place 0.4 mg under the tongue Every 5 (Five) Minutes As Needed for Chest Pain (x 3 doses).      • O2 (OXYGEN) Inhale 2 L/min Continuous.      • ondansetron (ZOFRAN) 4 MG tablet Take 1 tablet by mouth Every 6 (Six) Hours As Needed for Nausea or Vomiting.      • oxyCODONE (ROXICODONE) 10 MG tablet Take 1 tablet by mouth 4 (Four) Times a Day As Needed for Moderate Pain or Severe Pain. 8 tablet 0    • pantoprazole " (PROTONIX) 40 MG EC tablet Take 1 tablet by mouth Every Night. 90 tablet 0    • promethazine (PHENERGAN) 25 MG tablet Take 25 mg by mouth Every 6 (Six) Hours As Needed for Nausea or Vomiting.      • sevelamer (RENVELA) 800 MG tablet Take 800 mg by mouth 3 (Three) Times a Day With Meals.      • Vitamin D, Ergocalciferol, 43694 units capsule Take 50,000 Units by mouth 1 (One) Time Per Week. Take on Wednesday  Indications: Kidney Failure Syndrome        Assessment & Plan   ASSESSMENT / PLAN      * No active hospital problems. *    1.  ESRD on hemodialysis- normally dialyzes TTS at [facility] in [place].  She missed her dialysis on Tuesday with scheduled for make-up treatment today.  Unfortunately she also missed this and we will plan for short dialysis treatment this afternoon.  We will plan to continue dialysis on TTS schedule while inpatient. Her last dialysis was last week on Saturday. She normally does 4hr 15 min because she has low clearances on 4 hrs treatment    2.  Bleeding from AV fistula- managed per primary team, currently resolved.  May need evaluation from CT surgeon at some point. New AVF created by Dr Fine in [place]     3.  Anemia of CKD    4.  CAD    5.  DM2    6.  CKD-MBD    7.  Hypertension      Thank you for the consult, we will continue to follow the patient.         I discussed the patients findings and my recommendations with patient and nursing staff      This document has been electronically signed by BRIDGETT Hadley on March 15, 2023 14:29 CDT      For this patient encounter, I have reviewed the Nurse Practitioner's documentation, medical decision making, and treatment plan and personally spent time with the patient.

## 2023-03-15 NOTE — PLAN OF CARE
"Goal Outcome Evaluation:  Plan of Care Reviewed With: spouse          worsening toe wounds- MD notes sepsis r/t gangrene. Also noted wounds to left heel and left shin. Hx ESRD w/ HD, COPD, CAD and DM (A1C currently 11H). Alb 2.6L. Cardiac/renal ADA diet, intakes 100% x2 meals. Pt in MRI - spouse in romm - states nkfa, no c/s problems. Reports good appete, Unsure of wt hx/changes. He states she is tryting to \"cut out dessert\" d/t high Glu. # w/ BMI 46.4, indicates morbid obesity. Per EPIC April 2022 298#, 7/6 268#. ? Wt loss r/t elev A1C, or possibly intentional. Will alert FNS re: fruit to replace desserts. Will attempt diet education re? Aic and wt, increased protein needs with pt on next visit. RD following.     " Detail Level: Detailed

## 2023-03-15 NOTE — ED PROVIDER NOTES
Subjective   History of Present Illness  This is a 63-year-old female with end-stage renal disease on hemodialysis who recently had a fistula placed.  Postoperatively there was some bleeding that prevented a visit to dialysis as scheduled yesterday and the patient is not sure if her next dialysis appointment was rescheduled for today or if she is just going to go tomorrow.  She states that her rehab.  This morning apparently there was some breakthrough oozing of dark blood on her dressing while she was using a wheelchair.  Therefore she decided to come to the emergency department for reevaluation.  She denies fevers or chills.  She denies chest pain.  She has been feeling increasingly short of breath and has noticed increasing edema of her face and extremities.        Review of Systems   All other systems reviewed and are negative.      Past Medical History:   Diagnosis Date   • Acute blood loss anemia 04/16/2017    Likely due to gastric oozing at this time. - Dr. Duarte (GI) was consulted and has now signed off, will follow up outpatient - pill colonoscopy showed AVMs - continue to monitor   • Acute cystitis with hematuria 03/31/2021 1/13: IV Rocephin 1 gm q 24 1/14 : transitioned  to omnicef 300mg. Urine cultures resulted and did not show growth. Omnicef discontinued as patient is asymptomatic   • Altered mental status 01/09/2022    - AMS on presentation - initial ABG pH 7.3, CO2 34 - Procal 0.29 - UA negative for acute cysitits -CTA head wnl  - Empiric Zosyn and Vancomycin -Lactate 2.5 on admission  - blood cultures no growth at 24 hours     • Anxiety    • CAD (coronary artery disease) 04/24/2021    S/P 3 stents 5/1/2021 for [facility] Continue ASA 81mg & Clopidogrel 75mg Continue Atorvastatin 40mg   • Carotid artery stenosis    • CHF (congestive heart failure) ([facility])    • Chronic obstructive lung disease ([facility])    • CKD (chronic kidney disease) stage 4, GFR 15-29 ml/min ([facility])    • CKD (chronic kidney disease), symptom  management only, stage 5 ([facility]) 10/05/2020    Results from last 7 days Lab Units 12/15/21 0548 12/14/21 1323 12/14/21 0916 CREATININE mg/dL 3.92* 3.21* 3.32*  Baseline creatinine 2-3 GFR 13-25 GFR 15 Dialysis MWF, sees Dr. Lauren Nephrology consult,, appreciate recommendations Continue Bumex 1mg bid daily Holding Bumex 2mg 4 times a week   • Colonic polyp    • Coronary arteriosclerosis    • Diabetes mellitus ([facility])    • Diabetic neuropathy ([facility])    • Ear pain, right 10/18/2021    - canal trauma due to patient scratching and DMT2 - added cortisporin ear drops   • Elevated troponin 10/12/2021    -most likely from CKD -Trending down -Neg chest pain   • Generalized abdominal pain 07/01/2022    Could be due to initiation of tube feeds vs dyspepsia vs abdominal cramps related to no PO intake due to intubation vs constipation Continue current laxative regiment  If no bowel movement by this afternoon will consider enema   • GERD (gastroesophageal reflux disease)    • GI bleed 05/13/2021    - GI will follow up outpatient - Protonix 40mg daily - Avoid medical DVT prophy and use mechanical at this time instead. - Continue to monitor - pill colonoscopy results showed AVMs   • History of transfusion    • Hypercholesterolemia    • Hyperosmolar hyperglycemic state (HHS) ([facility]) 06/25/2022    Serum glucose 605 on admission  Anion gap 16 PH 7.37 Bicarb 27.9 Continue fluids  Insulin drip with [facility] protocol  Anion gap closed around 10 AM, received one dose of Levamir subq, will stop insulin drip after 2 hrs     • Hypertension    • Hypokalemia 05/27/2022    Will replace as needed. Will be cautious in the setting of ESRD to avoid need for emergency dialysis   Monitor Qtc intervals on EKG     • Hypomagnesemia 06/27/2021    Monitor and replace   • Morbid obesity ([facility])    • Nephrolithiasis    • On mechanically assisted ventilation ([facility]) 06/26/2022    Vent management and sedation orders placed.  - [facility] intensivist group consulted for  vent management appreciate recommendations  - plan to extubate today     • Peripheral vascular disease ([facility])    • Pleural effusion on right 06/26/2022    CXR on 6/30/22 read as a small upper left pulmonary edema vs early pneumonia.  Last Echo 1/2022 EF 61-65 % Continue to monitor  Procal slightly improved, CRP improved On Linezolid and meropenum      • PVD (peripheral vascular disease) ([facility])    • SIRS (systemic inflammatory response syndrome) ([facility]) 01/09/2022    Admission  - WBC 17.78   -   - RR 16 - 1/10: VSS/wnl - CXR - Mild pulm edema - Blood cultures no growth at 24 hours  - Procalcitonin 0.29 - UA : glucose 1000, negative Leucocytes/nitrate - Empiric Zosyn and Vancomcyin    • Sleep apnea    • Substance abuse ([facility])    • Vitamin D deficiency        Allergies   Allergen Reactions   • Adhesive Tape Hives, Other (See Comments) and Rash   • Nsaids Hives   • Latex Other (See Comments) and Hives   • Other Itching     Bandaids, MRSA, SURECLOSE   • Wound Dressing Adhesive Hives and Rash       Past Surgical History:   Procedure Laterality Date   • AMPUTATION DIGIT Right 2/27/2023    Procedure: Excision of right first metatarsal head, right second toe amputation;  Surgeon: Jean Oliveira DPM;  Location: [facility];  Service: Podiatry;  Laterality: Right;   • BELOW KNEE AMPUTATION Left 12/16/2022    Procedure: AMPUTATION BELOW KNEE, LEFT;  Surgeon: Huy White MD;  Location: [facility] OR;  Service: Orthopedics;  Laterality: Left;   • CARDIAC CATHETERIZATION N/A 07/14/2020   • CARDIAC CATHETERIZATION N/A 04/23/2021    Procedure: Left Heart Cath;  Surgeon: Melba Romo MD;  Location: [facility] CATH INVASIVE LOCATION;  Service: Cardiology;  Laterality: N/A;   • CARDIAC CATHETERIZATION N/A 04/30/2021    Procedure: Percutaneous Coronary Intervention;  Surgeon: Russell Voss MD;  Location: [facility] CATH INVASIVE LOCATION;  Service: Cardiovascular;  Laterality: N/A;   • CARDIAC CATHETERIZATION N/A  04/30/2021    Procedure: Stent NIKKI coronary;  Surgeon: Russell Voss MD;  Location: [facility] CATH INVASIVE LOCATION;  Service: Cardiovascular;  Laterality: N/A;   • CARDIAC CATHETERIZATION Left 11/13/2021    Procedure: Left Heart Cath;  Surgeon: Niall Rios MD;  Location: [facility] CATH INVASIVE LOCATION;  Service: Cardiology;  Laterality: Left;   • CAROTID STENT Left    • COLONOSCOPY     • COLONOSCOPY N/A 05/14/2021    Procedure: COLONOSCOPY;  Surgeon: Mingo Duarte MD;  Location: [facility] ENDOSCOPY;  Service: Gastroenterology;  Laterality: N/A;   • CORONARY ARTERY BYPASS GRAFT N/A 2013    CABG X 3   • CYSTOSCOPY BLADDER STONE LITHOTRIPSY Bilateral    • ENDOSCOPY N/A 04/12/2021    Procedure: ESOPHAGOGASTRODUODENOSCOPY;  Surgeon: Mingo Duarte MD;  Location: [facility] ENDOSCOPY;  Service: Gastroenterology;  Laterality: N/A;   • ENDOSCOPY N/A 05/14/2021    Procedure: ESOPHAGOGASTRODUODENOSCOPY;  Surgeon: Mingo Duarte MD;  Location: [facility] ENDOSCOPY;  Service: Gastroenterology;  Laterality: N/A;   • ENDOSCOPY N/A 01/28/2022    Procedure: ESOPHAGOGASTRODUODENOSCOPY;  Surgeon: Mingo Duarte MD;  Location: [facility] ENDOSCOPY;  Service: Gastroenterology;  Laterality: N/A;   • HYSTERECTOMY     • INTERVENTIONAL RADIOLOGY PROCEDURE N/A 10/21/2021    Procedure: tunneled central venous catheter placement;  Surgeon: Donnie Robles MD;  Location: [facility] ANGIO INVASIVE LOCATION;  Service: Interventional Radiology;  Laterality: N/A;   • INTERVENTIONAL RADIOLOGY PROCEDURE N/A 01/27/2022    Procedure: tunneled central venous catheter placement;  Surgeon: Donnie Robles MD;  Location: [facility] ANGIO INVASIVE LOCATION;  Service: Interventional Radiology;  Laterality: N/A;   • LAPAROSCOPIC TUBAL LIGATION     • TUNNELED VENOUS CATHETER PLACEMENT         Family History   Problem Relation Age of Onset   • Heart disease Mother    • Lung cancer Mother    • Heart disease Father    • Heart attack Father     • Diabetes Father    • Heart disease Half-Sister         Dad's side   • Heart disease Brother    • No Known Problems Sister    • No Known Problems Sister    • No Known Problems Sister    • No Known Problems Sister    • No Known Problems Sister    • Pancreatic cancer Half-Sister         Dad's side   • No Known Problems Brother    • No Known Problems Brother    • Heart attack Half-Brother    • Heart attack Half-Brother    • No Known Problems Maternal Grandmother    • No Known Problems Maternal Grandfather    • No Known Problems Paternal Grandmother    • No Known Problems Paternal Grandfather        Social History     Socioeconomic History   • Marital status: Legally      Spouse name: wesley dumont   Tobacco Use   • Smoking status: Every Day     Packs/day: 0.25     Years: 46.00     Pack years: 11.50     Types: Cigarettes     Start date: 2/1/1999   • Smokeless tobacco: Never   Vaping Use   • Vaping Use: Never used   Substance and Sexual Activity   • Alcohol use: No   • Drug use: Not Currently     Types: LSD, Marijuana, Methamphetamines   • Sexual activity: Defer           Objective   Physical Exam  Vitals and nursing note reviewed.   Constitutional:       Appearance: She is obese.      Comments: Appears chronically ill   HENT:      Head: Normocephalic and atraumatic.      Nose: Nose normal.   Eyes:      General: No scleral icterus.  Cardiovascular:      Rate and Rhythm: Normal rate.   Pulmonary:      Effort: Pulmonary effort is normal.   Skin:     General: Skin is dry.   Neurological:      Mental Status: She is alert and oriented to person, place, and time.   Psychiatric:         Behavior: Behavior normal.         Procedures           ED Course  ED Course as of 03/15/23 2346   Wed Mar 15, 2023   1438 CBC & Differential(!)  Hemoglobin at baseline [JV]      ED Course User Index  [JV] Jf Adkins DO KASPER reviewed by Esau Ferguson MD, Jf Adkins DO       Medical  "Decision Making  The patient's recent past medical charts for this facility as well as outside facilities via the \"care everywhere\" application of epic reviewed.  The patient recently had an AV fistula placed at an outside facility but receives hemodialysis at this facility due to this being the location of her rehabilitation center.  The patient was in the emergency department yesterday for minor bleeding from the surgical site of the AV fistula and it improved with direct pressure and topical application of Surgicel.    The differential diagnosis includes anemia, postoperative bleeding, pulmonary edema, and hyperkalemia, among others.    On final reevaluation the patient the patient is in stable condition and agreeable to being admitted.      Acute respiratory failure with hypoxia (HCC): acute illness or injury  Draining postoperative wound, initial encounter: acute illness or injury  ESRD (end stage renal disease) on dialysis (HCC): acute illness or injury  Amount and/or Complexity of Data Reviewed  Labs: ordered. Decision-making details documented in ED Course.  Radiology: ordered and independent interpretation performed.     Details: Chest x-ray interpretation: Single view chest x-ray.  Interpreted by myself.  Concerning for developing pulmonary edema.  2:39 PM.  Discussion of management or test interpretation with external provider(s): Care coordinated with Dr. Lauren nephrology regarding the patient's next hemodialysis.  Labs and x-ray discussed.  He recommends admitting the patient for dialysis.    Risk  OTC drugs.  Prescription drug management.  Decision regarding hospitalization.          Final diagnoses:   Acute respiratory failure with hypoxia (HCC)   ESRD (end stage renal disease) on dialysis (HCC)   Draining postoperative wound, initial encounter       ED Disposition  ED Disposition     ED Disposition   Decision to Admit    Condition   --    Comment   Level of Care: Telemetry [5]   Diagnosis: Acute " respiratory failure with hypoxia (HCC) [030776]   Admitting Physician: DIAN ALEXANDER [[id number]]   Attending Physician: DIAN ALEXANDER [[id number]]               No follow-up provider specified.       Medication List      ASK your doctor about these medications    atorvastatin 40 MG tablet  Commonly known as: LIPITOR  Ask about: Which instructions should I use?     furosemide 20 MG tablet  Commonly known as: LASIX  Ask about: Which instructions should I use?             Jf Adkins,   03/15/23 2441

## 2023-03-15 NOTE — H&P
[facility] Medicine Admission      Date of Admission: 3/15/2023      Primary Care Physician: Kiersten Wilson APRN      Chief Complaint: Missed Dialysis     HPI: Patient is a 63-year-old female with past medical history notable for CAD, end-stage renal disease on hemodialysis, type 2 diabetes mellitus, GERD, previous history of GI bleed, peripheral vascular disease, and hypertension who presented to the emergency department due to several days of missed dialysis sessions.  Patient had recently had a AV fistula in May at that reportedly had some bleeding issues earlier in the week but appears to be fine now and due to complications related to this is unfortunately missed several dialysis sessions and could not make her dialysis session today.  Patient was directed to the emergency department for admission for dialysis.  Patient has no complaints and denies any shortness of breath despite having been noted to be mildly hypoxic in the emergency department.  Patient notes her normal dialysis days are on Tuesday, Thursday, and Saturdays.  He states she is currently in rehab and when she finishes the rehab she will transition back to Monday Wednesday and Friday dialysis sessions    Concurrent Medical History:  has a past medical history of Acute blood loss anemia (04/16/2017), Acute cystitis with hematuria (03/31/2021), Altered mental status (01/09/2022), Anxiety, CAD (coronary artery disease) (04/24/2021), Carotid artery stenosis, CHF (congestive heart failure) ([facility]), Chronic obstructive lung disease ([facility]), CKD (chronic kidney disease) stage 4, GFR 15-29 ml/min ([facility]), CKD (chronic kidney disease), symptom management only, stage 5 ([facility]) (10/05/2020), Colonic polyp, Coronary arteriosclerosis, Diabetes mellitus ([facility]), Diabetic neuropathy ([facility]), Ear pain, right (10/18/2021), Elevated troponin (10/12/2021), Generalized abdominal pain (07/01/2022), GERD  (gastroesophageal reflux disease), GI bleed (05/13/2021), History of transfusion, Hypercholesterolemia, Hyperosmolar hyperglycemic state (HHS) ([facility]) (06/25/2022), Hypertension, Hypokalemia (05/27/2022), Hypomagnesemia (06/27/2021), Morbid obesity ([facility]), Nephrolithiasis, On mechanically assisted ventilation ([facility]) (06/26/2022), Peripheral vascular disease ([facility]), Pleural effusion on right (06/26/2022), PVD (peripheral vascular disease) ([facility]), SIRS (systemic inflammatory response syndrome) ([facility]) (01/09/2022), Sleep apnea, Substance abuse ([facility]), and Vitamin D deficiency.    Past Surgical History:  has a past surgical history that includes Colonoscopy; Carotid stent (Left); Cardiac catheterization (N/A, 07/14/2020); cystoscopy bladder stone lithotripsy (Bilateral); Esophagogastroduodenoscopy (N/A, 04/12/2021); Cardiac catheterization (N/A, 04/23/2021); Cardiac catheterization (N/A, 04/30/2021); Cardiac catheterization (N/A, 04/30/2021); Esophagogastroduodenoscopy (N/A, 05/14/2021); Colonoscopy (N/A, 05/14/2021); Coronary artery bypass graft (N/A, 2013); Interventional radiology procedure (N/A, 10/21/2021); Cardiac catheterization (Left, 11/13/2021); Interventional radiology procedure (N/A, 01/27/2022); Esophagogastroduodenoscopy (N/A, 01/28/2022); Hysterectomy; Laparoscopic tubal ligation; Tunneled venous catheter placement; Leg amputation, lower tibia/fibula (Left, 12/16/2022); and Finger amputation (Right, 2/27/2023).    Family History: family history includes Diabetes in her father; Heart attack in her father, half-brother, and half-brother; Heart disease in her brother, father, half-sister, and mother; Lung cancer in her mother; No Known Problems in her brother, brother, maternal grandfather, maternal grandmother, paternal grandfather, paternal grandmother, sister, sister, sister, sister, and sister; Pancreatic cancer in her half-sister.  No complaints    Social History:  reports that she has been smoking cigarettes.  She started smoking about 24 years ago. She has a 11.50 pack-year smoking history. She has never used smokeless tobacco. She reports that she does not currently use drugs after having used the following drugs: LSD, Marijuana, and Methamphetamines. She reports that she does not drink alcohol.    Allergies:   Allergies   Allergen Reactions   • Adhesive Tape Hives, Other (See Comments) and Rash   • Nsaids Hives   • Latex Other (See Comments) and Hives   • Other Itching     Bandaids, MRSA, SURECLOSE   • Wound Dressing Adhesive Hives and Rash       Medications:   Prior to Admission medications    Medication Sig Start Date End Date Taking? Authorizing Provider   acetaminophen (Tylenol 8 Hour) 650 MG 8 hr tablet Take 1 tablet by mouth Every 8 (Eight) Hours As Needed for Mild Pain . 2/1/22   Blake Burris MD   albuterol (PROVENTIL) (2.5 MG/3ML) 0.083% nebulizer solution Inhale the contents of 1 vial by nebulization Every 4 (Four) Hours As Needed for Wheezing. 10/28/21   Haily Mcneil MD   albuterol sulfate  (90 Base) MCG/ACT inhaler Inhale 2 puffs Every 4 (Four) Hours As Needed for Wheezing. 3/26/21   Nirmal Calvillo MD   atorvastatin (LIPITOR) 20 MG tablet Take 1 tablet by mouth every night at bedtime. 4/26/22   Kit Brar MD   Blood Glucose Monitoring Suppl ([employer] Blood Glucose Meter) w/Device kit 1 each 3 (Three) Times a Day. 10/9/20   Nirmal Calvillo MD   Capsaicin 0.035 % cream Apply 3 g topically 3 (Three) Times a Day As Needed (ankle pain). 4/26/22   Kit Brar MD   carvedilol (COREG) 6.25 MG tablet Take 1 tablet by mouth 2 (Two) Times a Day With Meals. 12/23/22   Jef Ramires MD   Cetirizine HCl 10 MG capsule Take 1 capsule by mouth Daily. 11/4/21   Caio Thompson MD   clopidogrel (PLAVIX) 75 MG tablet Take 1 tablet by mouth Daily. 3/26/21   Nirmal Calvillo MD   CYCLOBENZAPRINE HCL PO Take 5 mg by mouth 2 (Two) Times a Day. 10/28/22   Concha  "RAMIREZ Flowers   Diclofenac Sodium (VOLTAREN) 1 % gel gel Apply 4 g topically to the appropriate area as directed 4 (Four) Times a Day As Needed (Ankle pain). 4/26/22   Kit Brar MD   docusate sodium (COLACE) 100 MG capsule Take 100 mg by mouth 2 (Two) Times a Day. 1/1/21   Caio Thompson MD   DULoxetine (CYMBALTA) 20 MG capsule Take 20 mg by mouth Daily. Indications: Disease of the Peripheral Nerves 1/1/21   Caio Thompson MD   Easy Touch Insulin Syringe 30G X 5/16\" 0.5 ML misc USE AS DIRECTED WITH LEVEMIR 12/1/21   Caio Thompson MD   EASY TOUCH PEN NEEDLES 31G X 8 MM misc  8/7/20   Caio Thompson MD   furosemide (Lasix) 80 MG tablet Take 1 tablet by mouth 4 (Four) Times a Week. Take on Monday, Wednesday, Friday, and Sunday 12/24/22   Jef Ramires MD   gabapentin (NEURONTIN) 300 MG capsule Take 1 capsule by mouth Daily. 12/23/22   Jef Ramires MD   hydrOXYzine (ATARAX) 25 MG tablet Take 25 mg by mouth Every 8 (Eight) Hours As Needed for Itching. 1/1/21   Caio Thompson MD   insulin detemir (LEVEMIR) 100 UNIT/ML injection Inject 20 Units under the skin into the appropriate area as directed Every 12 (Twelve) Hours. 12/23/22   Jef Ramires MD   Insulin Lispro, 1 Unit Dial, (HUMALOG) 100 UNIT/ML solution pen-injector Inject 14-35 Units under the skin into the appropriate area as directed 3 (Three) Times a Day With Meals. Takes 14 units with meals plus sliding scale  Sliding scale:   150-199 take 4 units  200-249 take 8 units  250-299 take 12 units  300-349 take 16 units  350-400 take 20 units  greater than 400, call physician 12/23/22   Jef Ramires MD   Insulin Pen Needle (NovoFine) 30G X 8 MM misc As directed 4 times daily 3/26/21   Nirmal Calvillo MD   Insulin Pen Needle 31G X 8 MM misc Use to inject insulin 4 (Four) Times a Day as directed. 10/28/21   Haily Mcneil MD   ipratropium-albuterol (DUO-NEB) 0.5-2.5 mg/3 ml nebulizer Take 3 mL by " nebulization 4 (Four) Times a Day As Needed. 3/22/17   Caio Thompson MD   losartan (COZAAR) 25 MG tablet Take 1 tablet by mouth Daily. 12/24/22   Jef Ramires MD   memantine (NAMENDA) 5 MG tablet Take 5 mg by mouth 2 (Two) Times a Day. 10/28/22   ConchaKristie lorenzo, DPHOMERO   montelukast (SINGULAIR) 10 MG tablet Take 1 tablet by mouth Every Night. 4/26/22   Kit Brar MD   nitroglycerin (NITROSTAT) 0.4 MG SL tablet Place 0.4 mg under the tongue Every 5 (Five) Minutes As Needed for Chest Pain (x 3 doses). 1/1/15   Caio Thompson MD   O2 (OXYGEN) Inhale 2 L/min Continuous. 1/1/21   Caio Thompson MD   ondansetron (ZOFRAN) 4 MG tablet Take 1 tablet by mouth Every 6 (Six) Hours As Needed for Nausea or Vomiting. 12/23/22   Jef Ramires MD   oxyCODONE (ROXICODONE) 10 MG tablet Take 1 tablet by mouth 4 (Four) Times a Day As Needed for Moderate Pain or Severe Pain. 12/23/22   Jef Ramires MD   pantoprazole (PROTONIX) 40 MG EC tablet Take 1 tablet by mouth Every Night. 4/26/22   Kit Brar MD   promethazine (PHENERGAN) 25 MG tablet Take 25 mg by mouth Every 6 (Six) Hours As Needed for Nausea or Vomiting. 11/4/22   Caio Thompson MD   sevelamer (RENVELA) 800 MG tablet Take 800 mg by mouth 3 (Three) Times a Day With Meals. 1/26/22   Caio Thompson MD   Vitamin D, Ergocalciferol, 86800 units capsule Take 50,000 Units by mouth 1 (One) Time Per Week. Take on Wednesday  Indications: Kidney Failure Syndrome 1/1/21   Caio Thompson MD   insulin aspart (novoLOG FLEXPEN) 100 UNIT/ML solution pen-injector sc pen Inject 30 Units under the skin into the appropriate area as directed 3 (Three) Times a Day With Meals. 2/1/22 3/17/22  Blake Burris MD       Review of Systems:  Review of Systems   Otherwise complete ROS is negative except as mentioned above.    Physical Exam:   Temp:  [97.8 °F (36.6 °C)] 97.8 °F (36.6 °C)  Heart Rate:  [64-70] 70  Resp:  [20-22]  20  BP: (154-162)/(70-80) 162/80  Physical Exam  Constitutional:       General: She is not in acute distress.     Appearance: She is not toxic-appearing.   HENT:      Head: Normocephalic and atraumatic.      Right Ear: External ear normal.      Left Ear: External ear normal.      Nose: Nose normal.      Mouth/Throat:      Pharynx: Oropharynx is clear.   Eyes:      Conjunctiva/sclera: Conjunctivae normal.   Cardiovascular:      Rate and Rhythm: Normal rate and regular rhythm.      Heart sounds: Normal heart sounds.   Pulmonary:      Effort: Pulmonary effort is normal. No respiratory distress.      Breath sounds: Normal breath sounds.   Abdominal:      General: Bowel sounds are normal.      Palpations: Abdomen is soft.      Tenderness: There is no abdominal tenderness.   Musculoskeletal:         General: Deformity present.      Comments: Left leg surgically absent below the knee, right foot with wound dressing in place.   Skin:     General: Skin is warm and dry.      Capillary Refill: Capillary refill takes less than 2 seconds.   Neurological:      General: No focal deficit present.      Mental Status: She is alert and oriented to person, place, and time. Mental status is at baseline.   Psychiatric:         Behavior: Behavior normal.           Results Reviewed:  I have personally reviewed current lab, radiology, and data and agree with results.  Lab Results (last 24 hours)     Procedure Component Value Units Date/Time    Basic Metabolic Panel [[id number]]  (Abnormal) Collected: 03/15/23 1421    Specimen: Blood Updated: 03/15/23 1458     Glucose 102 mg/dL      BUN 74 mg/dL      Creatinine 4.80 mg/dL      Sodium 135 mmol/L      Potassium 4.0 mmol/L      Chloride 100 mmol/L      CO2 22.0 mmol/L      Calcium 10.0 mg/dL      BUN/Creatinine Ratio 15.4     Anion Gap 13.0 mmol/L      eGFR 9.6 mL/min/1.73      Comment: <15 Indicative of kidney failure       Narrative:      GFR Normal >60  Chronic Kidney Disease <60  Kidney  Failure <15      CBC & Differential [[id number]]  (Abnormal) Collected: 03/15/23 1421    Specimen: Blood Updated: 03/15/23 1438    Narrative:      The following orders were created for panel order CBC & Differential.  Procedure                               Abnormality         Status                     ---------                               -----------         ------                     CBC Auto Differential[[id number]]        Abnormal            Final result                 Please view results for these tests on the individual orders.    CBC Auto Differential [[id number]]  (Abnormal) Collected: 03/15/23 1421    Specimen: Blood Updated: 03/15/23 1438     WBC 9.95 10*3/mm3      RBC 3.62 10*6/mm3      Hemoglobin 10.3 g/dL      Hematocrit 32.7 %      MCV 90.3 fL      MCH 28.5 pg      MCHC 31.5 g/dL      RDW 16.1 %      RDW-SD 52.7 fl      MPV 8.4 fL      Platelets 311 10*3/mm3      Neutrophil % 67.3 %      Lymphocyte % 22.6 %      Monocyte % 5.6 %      Eosinophil % 2.9 %      Basophil % 0.7 %      Immature Grans % 0.9 %      Neutrophils, Absolute 6.69 10*3/mm3      Lymphocytes, Absolute 2.25 10*3/mm3      Monocytes, Absolute 0.56 10*3/mm3      Eosinophils, Absolute 0.29 10*3/mm3      Basophils, Absolute 0.07 10*3/mm3      Immature Grans, Absolute 0.09 10*3/mm3      nRBC 0.0 /100 WBC         Imaging Results (Last 24 Hours)     Procedure Component Value Units Date/Time    XR Chest 1 View [[id number]] Collected: 03/15/23 1401     Updated: 03/15/23 1452    Narrative:      PROCEDURE: Portable chest x-ray    TECHNIQUE: Single frontal view of the chest    COMPARISON: Chest x-ray 12/16/2022    HISTORY: soa, J96.01 Acute respiratory failure with hypoxia N18.6  End stage renal disease Z99.2 Dependence on renal dialysis  T81.89XA Other complications of procedures, not elsewhere  classified, initial encounter    FINDINGS: Right-sided hemodialysis catheter appears stable in  position.  No overt pulmonary vascular congestion, focal  pulmonary  parenchymal opacity, pleural effusion or pneumothorax. Cardiac  silhouette is enlarged. Sternal suture wires appear stable.  Surgical clips again noted in the left neck.      Impression:      No acute pulmonary disease.    Electronically signed by:  Umesh Parra MD  3/15/2023 2:49 PM CDT  Workstation: [id number]            Assessment:    Active Hospital Problems    Diagnosis    • **Acute respiratory failure with hypoxia (HCC)    • ESRD on hemodialysis ([facility])    • Type 2 diabetes mellitus with circulatory disorder ([facility])    • CAD (coronary artery disease)    • COPD (chronic obstructive pulmonary disease) ([facility])              Plan:  - Admit for hemodialysis  - Nephrology already consulted  - Continue supplemental oxygen wean as tolerated  - Home medications will be continued as appropriate  - No obvious signs of any current infection at this time so no indication for antibiotics  - PT and OT  - Pending how she does with her dialysis session she may be able to return back to rehab afterwards tomorrow  - DVT prophylaxis with heparin  - CODE STATUS: Full        Medical Decision Making  Number and Complexity of problems: 5 high complexity  Differential Diagnosis: Acute hypoxia secondary to infectious versus volume overload causes related to renal disease    Conditions and Status:        Condition is unchanged.     [facility] Data  External documents reviewed: Previous records  My plain film interpretation: Agree with radiology interpretation  Tests considered but not ordered: None     Decision rules/scores evaluated (example [id number], Wells, etc): None     Discussed with: The patient     Treatment Plan  As above    Care Planning  Shared decision making: Patient  Code status and discussions: Full    Disposition  Social Determinants of Health that impact treatment or disposition: None  I expect the patient to be discharged to SNF/rehab in 1-2 days.     I have utilized all available immediate resources to obtain,  update, or review the patient's current medications (including all prescriptions, over-the-counter products, herbals, cannabis/cannabidiol products, and vitamin/mineral/dietary (nutritional) supplements).   I confirmed that the patient's Advance Care Plan is present, code status is documented, or surrogate decision maker is listed in the patient's medical record.    I discussed the patient's findings and my recommendations with: Patient    Esau Ferguson MD

## 2023-03-15 NOTE — ED NOTES
Nursing report ED to floor  Yamileth Slater  63 y.o.  female    HPI:   Chief Complaint   Patient presents with    Fistula Bleeding       Admitting doctor:   Esau Ferguson MD    Consulting provider(s):  Consults       No orders found from 2/14/2023 to 3/16/2023.             Admitting diagnosis:   The primary encounter diagnosis was Acute respiratory failure with hypoxia (HCC). Diagnoses of ESRD (end stage renal disease) on dialysis (HCC) and Draining postoperative wound, initial encounter were also pertinent to this visit.    Code status:   Current Code Status       Date Active Code Status Order ID Comments User Context       Prior            Allergies:   Adhesive tape, Nsaids, Latex, Other, and Wound dressing adhesive    Intake and Output  No intake or output data in the 24 hours ending 03/15/23 1526    Weight:   There were no vitals filed for this visit.    Most recent vitals:   Vitals:    03/15/23 1255 03/15/23 1356 03/15/23 1459   BP: 162/70 154/70 162/80   BP Location: Left arm     Patient Position: Lying     Pulse: 66 64 70   Resp: 22 20 20   Temp: 97.8 °F (36.6 °C)     TempSrc: Oral     SpO2: 92% 98% 97%     Oxygen Therapy: 3L nasal cannula    Active LDAs/IV Access:   Lines, Drains & Airways       Active LDAs       Name Placement date Placement time Site Days    Hemodialysis Cath Double 01/27/22  1106  Internal Jugular  412                    Labs (abnormal labs have a star):   Labs Reviewed   BASIC METABOLIC PANEL - Abnormal; Notable for the following components:       Result Value    Glucose 102 (*)     BUN 74 (*)     Creatinine 4.80 (*)     Sodium 135 (*)     eGFR 9.6 (*)     All other components within normal limits    Narrative:     GFR Normal >60  Chronic Kidney Disease <60  Kidney Failure <15     CBC WITH AUTO DIFFERENTIAL - Abnormal; Notable for the following components:    RBC 3.62 (*)     Hemoglobin 10.3 (*)     Hematocrit 32.7 (*)     RDW 16.1 (*)     Immature Grans % 0.9 (*)     Immature  Grans, Absolute 0.09 (*)     All other components within normal limits   CBC AND DIFFERENTIAL    Narrative:     The following orders were created for panel order CBC & Differential.  Procedure                               Abnormality         Status                     ---------                               -----------         ------                     CBC Auto Differential[[id number]]        Abnormal            Final result                 Please view results for these tests on the individual orders.       Meds given in ED:   Medications - No data to display  No current facility-administered medications for this encounter.       NIH Stroke Scale:       Isolation/Infection(s):  No active isolations   [facility]     COVID Testing  Collected n/a  Resulted n/a    Nursing report ED to floor:  Mental status: alert and orientedx4  Ambulatory status: n/a  Precautions: n/a    ED nurse phone extentsixr- [phone]

## 2023-03-15 NOTE — PLAN OF CARE
Goal Outcome Evaluation:  Plan of Care Reviewed With: patient        Progress: no change  Outcome Evaluation: dialysis in room, resting between care, pics of wounds

## 2023-03-16 VITALS
WEIGHT: 222.5 LBS | TEMPERATURE: 97.3 F | OXYGEN SATURATION: 97 % | SYSTOLIC BLOOD PRESSURE: 118 MMHG | BODY MASS INDEX: 40.7 KG/M2 | RESPIRATION RATE: 17 BRPM | DIASTOLIC BLOOD PRESSURE: 60 MMHG | HEART RATE: 71 BPM

## 2023-03-16 LAB
ALBUMIN SERPL-MCNC: 3.3 G/DL (ref 3.5–5.2)
ALBUMIN/GLOB SERPL: 0.7 G/DL
ALP SERPL-CCNC: 239 U/L (ref 39–117)
ALT SERPL W P-5'-P-CCNC: <5 U/L (ref 1–33)
ANION GAP SERPL CALCULATED.3IONS-SCNC: 20 MMOL/L (ref 5–15)
AST SERPL-CCNC: 10 U/L (ref 1–32)
BASOPHILS # BLD AUTO: 0.09 10*3/MM3 (ref 0–0.2)
BASOPHILS NFR BLD AUTO: 0.9 % (ref 0–1.5)
BILIRUB SERPL-MCNC: 0.3 MG/DL (ref 0–1.2)
BUN SERPL-MCNC: 55 MG/DL (ref 8–23)
BUN/CREAT SERPL: 13.3 (ref 7–25)
CALCIUM SPEC-SCNC: 9.7 MG/DL (ref 8.6–10.5)
CHLORIDE SERPL-SCNC: 93 MMOL/L (ref 98–107)
CO2 SERPL-SCNC: 19 MMOL/L (ref 22–29)
CREAT SERPL-MCNC: 4.14 MG/DL (ref 0.57–1)
DEPRECATED RDW RBC AUTO: 54.3 FL (ref 37–54)
EGFRCR SERPLBLD CKD-EPI 2021: 11.5 ML/MIN/1.73
EOSINOPHIL # BLD AUTO: 0.24 10*3/MM3 (ref 0–0.4)
EOSINOPHIL NFR BLD AUTO: 2.3 % (ref 0.3–6.2)
ERYTHROCYTE [DISTWIDTH] IN BLOOD BY AUTOMATED COUNT: 16.2 % (ref 12.3–15.4)
GLOBULIN UR ELPH-MCNC: 5 GM/DL
GLUCOSE BLDC GLUCOMTR-MCNC: 210 MG/DL (ref 70–130)
GLUCOSE BLDC GLUCOMTR-MCNC: 284 MG/DL (ref 70–130)
GLUCOSE BLDC GLUCOMTR-MCNC: 351 MG/DL (ref 70–130)
GLUCOSE SERPL-MCNC: 238 MG/DL (ref 65–99)
HCT VFR BLD AUTO: 36.7 % (ref 34–46.6)
HGB BLD-MCNC: 11.4 G/DL (ref 12–15.9)
IMM GRANULOCYTES # BLD AUTO: 0.11 10*3/MM3 (ref 0–0.05)
IMM GRANULOCYTES NFR BLD AUTO: 1.1 % (ref 0–0.5)
LYMPHOCYTES # BLD AUTO: 2.91 10*3/MM3 (ref 0.7–3.1)
LYMPHOCYTES NFR BLD AUTO: 28.5 % (ref 19.6–45.3)
MCH RBC QN AUTO: 28.5 PG (ref 26.6–33)
MCHC RBC AUTO-ENTMCNC: 31.1 G/DL (ref 31.5–35.7)
MCV RBC AUTO: 91.8 FL (ref 79–97)
MONOCYTES # BLD AUTO: 0.64 10*3/MM3 (ref 0.1–0.9)
MONOCYTES NFR BLD AUTO: 6.3 % (ref 5–12)
NEUTROPHILS NFR BLD AUTO: 6.23 10*3/MM3 (ref 1.7–7)
NEUTROPHILS NFR BLD AUTO: 60.9 % (ref 42.7–76)
NRBC BLD AUTO-RTO: 0 /100 WBC (ref 0–0.2)
PLATELET # BLD AUTO: 278 10*3/MM3 (ref 140–450)
PMV BLD AUTO: 8.5 FL (ref 6–12)
POTASSIUM SERPL-SCNC: 4.3 MMOL/L (ref 3.5–5.2)
PROT SERPL-MCNC: 8.3 G/DL (ref 6–8.5)
RBC # BLD AUTO: 4 10*6/MM3 (ref 3.77–5.28)
SODIUM SERPL-SCNC: 132 MMOL/L (ref 136–145)
WBC NRBC COR # BLD: 10.22 10*3/MM3 (ref 3.4–10.8)

## 2023-03-16 PROCEDURE — 63710000001 INSULIN ASPART PER 5 UNITS: Performed by: FAMILY MEDICINE

## 2023-03-16 PROCEDURE — 97162 PT EVAL MOD COMPLEX 30 MIN: CPT

## 2023-03-16 PROCEDURE — 25010000002 HEPARIN (PORCINE) PER 1000 UNITS: Performed by: INTERNAL MEDICINE

## 2023-03-16 PROCEDURE — 80053 COMPREHEN METABOLIC PANEL: CPT | Performed by: FAMILY MEDICINE

## 2023-03-16 PROCEDURE — G0378 HOSPITAL OBSERVATION PER HR: HCPCS

## 2023-03-16 PROCEDURE — 25010000002 HEPARIN (PORCINE) PER 1000 UNITS: Performed by: FAMILY MEDICINE

## 2023-03-16 PROCEDURE — 97166 OT EVAL MOD COMPLEX 45 MIN: CPT

## 2023-03-16 PROCEDURE — 96372 THER/PROPH/DIAG INJ SC/IM: CPT

## 2023-03-16 PROCEDURE — 85025 COMPLETE CBC W/AUTO DIFF WBC: CPT | Performed by: FAMILY MEDICINE

## 2023-03-16 PROCEDURE — G0257 UNSCHED DIALYSIS ESRD PT HOS: HCPCS

## 2023-03-16 PROCEDURE — 82962 GLUCOSE BLOOD TEST: CPT

## 2023-03-16 RX ORDER — OXYCODONE HYDROCHLORIDE 10 MG/1
10 TABLET ORAL 4 TIMES DAILY PRN
Qty: 8 TABLET | Refills: 0 | Status: SHIPPED | OUTPATIENT
Start: 2023-03-16 | End: 2023-03-18

## 2023-03-16 RX ORDER — GABAPENTIN 300 MG/1
300 CAPSULE ORAL DAILY
Qty: 3 CAPSULE | Refills: 0 | Status: SHIPPED | OUTPATIENT
Start: 2023-03-16 | End: 2023-03-19

## 2023-03-16 RX ADMIN — CARVEDILOL 6.25 MG: 6.25 TABLET, FILM COATED ORAL at 08:44

## 2023-03-16 RX ADMIN — HEPARIN SODIUM 5000 UNITS: 5000 INJECTION INTRAVENOUS; SUBCUTANEOUS at 08:44

## 2023-03-16 RX ADMIN — DULOXETINE HYDROCHLORIDE 20 MG: 20 CAPSULE, DELAYED RELEASE ORAL at 08:44

## 2023-03-16 RX ADMIN — NICOTINE 1 PATCH: 7 PATCH, EXTENDED RELEASE TRANSDERMAL at 08:44

## 2023-03-16 RX ADMIN — SEVELAMER CARBONATE 800 MG: 800 TABLET, FILM COATED ORAL at 08:45

## 2023-03-16 RX ADMIN — CLOPIDOGREL BISULFATE 75 MG: 75 TABLET ORAL at 08:44

## 2023-03-16 RX ADMIN — MEMANTINE 5 MG: 5 TABLET ORAL at 08:44

## 2023-03-16 RX ADMIN — HYDROXYZINE HYDROCHLORIDE 25 MG: 25 TABLET, FILM COATED ORAL at 08:51

## 2023-03-16 RX ADMIN — INSULIN ASPART 8 UNITS: 100 INJECTION, SOLUTION INTRAVENOUS; SUBCUTANEOUS at 11:11

## 2023-03-16 RX ADMIN — GABAPENTIN 300 MG: 300 CAPSULE ORAL at 08:45

## 2023-03-16 RX ADMIN — OXYCODONE HYDROCHLORIDE 10 MG: 10 TABLET ORAL at 08:51

## 2023-03-16 RX ADMIN — ATORVASTATIN CALCIUM 40 MG: 40 TABLET, FILM COATED ORAL at 08:44

## 2023-03-16 RX ADMIN — HEPARIN SODIUM 4000 UNITS: 1000 INJECTION INTRAVENOUS; SUBCUTANEOUS at 13:04

## 2023-03-16 RX ADMIN — SEVELAMER CARBONATE 800 MG: 800 TABLET, FILM COATED ORAL at 11:11

## 2023-03-16 RX ADMIN — HEPARIN SODIUM 4000 UNITS: 1000 INJECTION INTRAVENOUS; SUBCUTANEOUS at 12:10

## 2023-03-16 RX ADMIN — DOCUSATE SODIUM 100 MG: 100 CAPSULE, LIQUID FILLED ORAL at 08:45

## 2023-03-16 RX ADMIN — INSULIN ASPART 5 UNITS: 100 INJECTION, SOLUTION INTRAVENOUS; SUBCUTANEOUS at 08:45

## 2023-03-16 RX ADMIN — CYCLOBENZAPRINE 5 MG: 5 TABLET, FILM COATED ORAL at 08:45

## 2023-03-16 NOTE — PLAN OF CARE
Goal Outcome Evaluation:  Plan of Care Reviewed With: patient           Outcome Evaluation: OT eval completed. Co-eval with PT. Pt supine in bed upon arrival. Alert and agreeable to therapy. Pt has a previous left BKA. Pt requested to immediatly get to the BSC to have a bowel movement. Pt required Mod A for sup to sit with HOB raised. Pt with good sitting balance. CGA for initial stand. Min A x2 required for stand pivot t/f to aned from the BSC. Pt sat on the BSC answering PLOF questions for 20 minutes. Pt was dependent for donning shoe and for bowel keyshawn-care. Pt requested to remain sittng EOB at end of session.  Pt left sitting EOB with RN notified and exit alarm on. Pt presents with impaired safety and (I) with ADLs and mobility. I/P OT will follow. Goals established. Anticipate return to SNF for rehab at d/c.

## 2023-03-16 NOTE — THERAPY EVALUATION
Patient Name: Yamileth Slater  : 1960    MRN: [id number]                              Today's Date: 3/16/2023       Admit Date: 3/15/2023    Visit Dx:     ICD-10-CM ICD-9-CM   1. Acute respiratory failure with hypoxia ([facility])  J96.01 518.81   2. ESRD (end stage renal disease) on dialysis ([facility])  N18.6 585.6    Z99.2 V45.11   3. Draining postoperative wound, initial encounter  T81.89XA 998.89   4. Impaired functional mobility, balance, gait, and endurance  Z74.09 V49.89   5. Impaired mobility and ADLs  Z74.09 V49.89    Z78.9      Patient Active Problem List   Diagnosis   • Chronic midline low back pain without sciatica   • Vitamin D deficiency   • Tobacco dependence syndrome   • Shoulder joint pain   • Morbid obesity with BMI of 50.0-59.9, adult ([facility])   • Mixed hyperlipidemia   • Kidney stone   • History of colon polyps   • GERD (gastroesophageal reflux disease)   • Excoriated eczema   • Hypertension   • COPD (chronic obstructive pulmonary disease) ([facility])   • Chronic folliculitis   • Coronary artery disease involving native coronary artery of native heart without angina pectoris   • 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   • Hypothyroidism   • Carotid artery disease ([facility])   • [place] abuse   • PAD (peripheral artery disease) ([facility])   • Venous insufficiency   • LAFB (left anterior fascicular block)   • Pulmonary hypertension ([facility])   • Left hip pain   • Neck pain   • MIKE and COPD overlap syndrome ([facility])   • Pneumonia due to COVID-19 virus   • Hyperkalemia   • Cutaneous candidiasis   • Community acquired pneumonia of right middle lobe of lung   • MRSA infection   • Esophageal dysphagia   • Monilial esophagitis ([facility])   • NSTEMI, initial episode of care ([facility])   • CAD (coronary artery disease)   • Cellulitis of foot associated with diabetes mellitus ([facility])   • Urinary tract infection due to Proteus   • History of insertion of tunneled central venous catheter (CVC) with port   • Anemia due to  chronic kidney disease, on chronic dialysis ([facility])   • Pneumonitis   • Personal history of noncompliance with medical treatment, presenting hazards to health   • Type 2 diabetes mellitus with circulatory disorder ([facility])   • Physical deconditioning   • ESRD on hemodialysis ([facility])   • DVT prophylaxis   • Anemia   • Ventilator associated pneumonia ([facility])   • Positive fecal occult blood test   • Yeast UTI   • Gangrene of both feet ([facility])   • Left foot pain   • 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 ([facility])   • On home oxygen therapy   • Subacute osteomyelitis of right foot ([facility])   • Acute respiratory failure with hypoxia ([facility])     Past Medical History:   Diagnosis Date   • Acute blood loss anemia 04/16/2017    Likely due to gastric oozing at this time. - Dr. Duarte (GI) was consulted and has now signed off, will follow up outpatient - pill colonoscopy showed AVMs - continue to monitor   • Acute cystitis with hematuria 03/31/2021 1/13: IV Rocephin 1 gm q 24 1/14 : transitioned  to omnicef 300mg. Urine cultures resulted and did not show growth. Omnicef discontinued as patient is asymptomatic   • Altered mental status 01/09/2022    - AMS on presentation - initial ABG pH 7.3, CO2 34 - Procal 0.29 - UA negative for acute cysitits -CTA head wnl  - Empiric Zosyn and Vancomycin -Lactate 2.5 on admission  - blood cultures no growth at 24 hours     • Anxiety    • CAD (coronary artery disease) 04/24/2021    S/P 3 stents 5/1/2021 for [facility] Continue ASA 81mg & Clopidogrel 75mg Continue Atorvastatin 40mg   • Carotid artery stenosis    • CHF (congestive heart failure) ([facility])    • Chronic obstructive lung disease ([facility])    • CKD (chronic kidney disease) stage 4, GFR 15-29 ml/min ([facility])    • CKD (chronic kidney disease), symptom management only, stage 5 ([facility]) 10/05/2020    Results from last 7 days Lab Units 12/15/21 0548 12/14/21 1323 12/14/21 0916 CREATININE mg/dL 3.92* 3.21* 3.32*  Baseline creatinine 2-3 GFR 13-25 GFR 15 Dialysis  MWF, sees Dr. Lauren Nephrology consult,, appreciate recommendations Continue Bumex 1mg bid daily Holding Bumex 2mg 4 times a week   • Colonic polyp    • Coronary arteriosclerosis    • Diabetes mellitus (HCC)    • Diabetic neuropathy (HCC)    • Ear pain, right 10/18/2021    - canal trauma due to patient scratching and DMT2 - added cortisporin ear drops   • Elevated troponin 10/12/2021    -most likely from CKD -Trending down -Neg chest pain   • Generalized abdominal pain 07/01/2022    Could be due to initiation of tube feeds vs dyspepsia vs abdominal cramps related to no PO intake due to intubation vs constipation Continue current laxative regiment  If no bowel movement by this afternoon will consider enema   • GERD (gastroesophageal reflux disease)    • GI bleed 05/13/2021    - GI will follow up outpatient - Protonix 40mg daily - Avoid medical DVT prophy and use mechanical at this time instead. - Continue to monitor - pill colonoscopy results showed AVMs   • History of transfusion    • Hypercholesterolemia    • Hyperosmolar hyperglycemic state (HHS) (formerly [facility]) 06/25/2022    Serum glucose 605 on admission  Anion gap 16 PH 7.37 Bicarb 27.9 Continue fluids  Insulin drip with [facility] protocol  Anion gap closed around 10 AM, received one dose of Levamir subq, will stop insulin drip after 2 hrs     • Hypertension    • Hypokalemia 05/27/2022    Will replace as needed. Will be cautious in the setting of ESRD to avoid need for emergency dialysis   Monitor Qtc intervals on EKG     • Hypomagnesemia 06/27/2021    Monitor and replace   • Morbid obesity ([facility])    • Nephrolithiasis    • On mechanically assisted ventilation (formerly [facility]) 06/26/2022    Vent management and sedation orders placed.  - [facility] intensivist group consulted for vent management appreciate recommendations  - plan to extubate today     • Peripheral vascular disease (HCC)    • Pleural effusion on right 06/26/2022    CXR on 6/30/22 read as a small upper left pulmonary  edema vs early pneumonia.  Last Echo 1/2022 EF 61-65 % Continue to monitor  Procal slightly improved, CRP improved On Linezolid and meropenum      • PVD (peripheral vascular disease) ([facility])    • SIRS (systemic inflammatory response syndrome) ([facility]) 01/09/2022    Admission  - WBC 17.78   -   - RR 16 - 1/10: VSS/wnl - CXR - Mild pulm edema - Blood cultures no growth at 24 hours  - Procalcitonin 0.29 - UA : glucose 1000, negative Leucocytes/nitrate - Empiric Zosyn and Vancomcyin    • Sleep apnea    • Substance abuse ([facility])    • Vitamin D deficiency      Past Surgical History:   Procedure Laterality Date   • AMPUTATION DIGIT Right 2/27/2023    Procedure: Excision of right first metatarsal head, right second toe amputation;  Surgeon: Jean Oliveira DPM;  Location: [facility];  Service: Podiatry;  Laterality: Right;   • BELOW KNEE AMPUTATION Left 12/16/2022    Procedure: AMPUTATION BELOW KNEE, LEFT;  Surgeon: Huy White MD;  Location: [facility];  Service: Orthopedics;  Laterality: Left;   • CARDIAC CATHETERIZATION N/A 07/14/2020   • CARDIAC CATHETERIZATION N/A 04/23/2021    Procedure: Left Heart Cath;  Surgeon: Melba Romo MD;  Location: [facility] INVASIVE LOCATION;  Service: Cardiology;  Laterality: N/A;   • CARDIAC CATHETERIZATION N/A 04/30/2021    Procedure: Percutaneous Coronary Intervention;  Surgeon: Russell Voss MD;  Location: [facility] CATH INVASIVE LOCATION;  Service: Cardiovascular;  Laterality: N/A;   • CARDIAC CATHETERIZATION N/A 04/30/2021    Procedure: Stent NIKKI coronary;  Surgeon: Russell Voss MD;  Location: [facility] CATH INVASIVE LOCATION;  Service: Cardiovascular;  Laterality: N/A;   • CARDIAC CATHETERIZATION Left 11/13/2021    Procedure: Left Heart Cath;  Surgeon: Niall Rios MD;  Location: [facility] CATH INVASIVE LOCATION;  Service: Cardiology;  Laterality: Left;   • CAROTID STENT Left    • COLONOSCOPY     • COLONOSCOPY N/A 05/14/2021    Procedure:  COLONOSCOPY;  Surgeon: Mingo Duarte MD;  Location: [facility] ENDOSCOPY;  Service: Gastroenterology;  Laterality: N/A;   • CORONARY ARTERY BYPASS GRAFT N/A 2013    CABG X 3   • CYSTOSCOPY BLADDER STONE LITHOTRIPSY Bilateral    • ENDOSCOPY N/A 04/12/2021    Procedure: ESOPHAGOGASTRODUODENOSCOPY;  Surgeon: Mingo Duarte MD;  Location: [facility] ENDOSCOPY;  Service: Gastroenterology;  Laterality: N/A;   • ENDOSCOPY N/A 05/14/2021    Procedure: ESOPHAGOGASTRODUODENOSCOPY;  Surgeon: Mingo Duarte MD;  Location: [facility] ENDOSCOPY;  Service: Gastroenterology;  Laterality: N/A;   • ENDOSCOPY N/A 01/28/2022    Procedure: ESOPHAGOGASTRODUODENOSCOPY;  Surgeon: Mingo Duarte MD;  Location: [facility] ENDOSCOPY;  Service: Gastroenterology;  Laterality: N/A;   • HYSTERECTOMY     • INTERVENTIONAL RADIOLOGY PROCEDURE N/A 10/21/2021    Procedure: tunneled central venous catheter placement;  Surgeon: Donnie Robles MD;  Location: [facility] ANGIO INVASIVE LOCATION;  Service: Interventional Radiology;  Laterality: N/A;   • INTERVENTIONAL RADIOLOGY PROCEDURE N/A 01/27/2022    Procedure: tunneled central venous catheter placement;  Surgeon: Donnie Robles MD;  Location: [facility]GIO INVASIVE LOCATION;  Service: Interventional Radiology;  Laterality: N/A;   • LAPAROSCOPIC TUBAL LIGATION     • TUNNELED VENOUS CATHETER PLACEMENT        General Information     Row Name 03/16/23 0900          OT Time and Intention    Document Type evaluation  -CM     Mode of Treatment co-treatment;physical therapy;occupational therapy  -CM     Row Name 03/16/23 0900          General Information    Patient Profile Reviewed yes  -CM     Prior Level of Function min assist:;transfer;bed mobility;mod assist:;dressing;dependent:;bathing  -CM     Existing Precautions/Restrictions fall  -CM     Barriers to Rehab medically complex;previous functional deficit  -CM     Row Name 03/16/23 0900          Living Environment    People in Home facility  resident  Patient is at [facility] for rehab to home s/p L BKA  -CM     Row Name 03/16/23 0900          Stairs Within Home, Primary    Stairs, Within Home, Primary Has been doing stand pivot transfers at [facility]  -     Row Name 03/16/23 0900          Cognition    Orientation Status (Cognition) oriented x 4  -CM     Row Name 03/16/23 0900          Safety Issues, Functional Mobility    Safety Issues Affecting Function (Mobility) positioning of assistive device;judgment;safety precautions follow-through/compliance;safety precaution awareness;sequencing abilities  -CM     Impairments Affecting Function (Mobility) balance;endurance/activity tolerance;strength  -CM           User Key  (r) = Recorded By, (t) = Taken By, (c) = Cosigned By    Initials Name Provider Type    Niya Watts OT Occupational Therapist                 Mobility/ADL's     Row Name 03/16/23 0900          Bed Mobility    Bed Mobility supine-sit  -CM     Supine-Sit [place] (Bed Mobility) moderate assist (50% patient effort)  -CM     Assistive Device (Bed Mobility) bed rails;head of bed elevated  -CM     Row Name 03/16/23 0900          Transfers    Transfers toilet transfer  -CM     Row Name 03/16/23 0900          Sit-Stand Transfer    Sit-Stand [place] (Transfers) minimum assist (75% patient effort)  -CM     Assistive Device (Sit-Stand Transfers) walker, 4-wheeled  -CM     Row Name 03/16/23 0900          Toilet Transfer    Type (Toilet Transfer) squat pivot  -CM     [place] Level (Toilet Transfer) moderate assist (50% patient effort)  -CM     Assistive Device (Toilet Transfer) commode, bedside without drop arms  -CM     Row Name 03/16/23 0900          Activities of Daily Living    BADL Assessment/Intervention lower body dressing;toileting  -CM     Row Name 03/16/23 0900          Lower Body Dressing Assessment/Training    [place] Level (Lower Body Dressing) lower body dressing skills;doff;don;shoes/slippers;dependent (less than  25% patient effort)  -CM     Row Name 03/16/23 0900          Toileting Assessment/Training    [place] Level (Toileting) toileting skills;adjust/manage clothing;perform perineal hygiene;dependent (less than 25% patient effort)  -CM     Assistive Devices (Toileting) commode, bedside without drop arms  -CM     Position (Toileting) supported standing  -CM           User Key  (r) = Recorded By, (t) = Taken By, (c) = Cosigned By    Initials Name Provider Type    Niya Watts OT Occupational Therapist               Obj/Interventions     Row Name 03/16/23 0900          Sensory Assessment (Somatosensory)    Sensory Assessment (Somatosensory) UE sensation intact  -CM           User Key  (r) = Recorded By, (t) = Taken By, (c) = Cosigned By    Initials Name Provider Type    Niya Watts OT Occupational Therapist               Goals/Plan     Row Name 03/16/23 0900          Transfer Goal 1 (OT)    Activity/Assistive Device (Transfer Goal 1, OT) commode, bedside without drop arms  -CM     [place] Level/Cues Needed (Transfer Goal 1, OT) standby assist  -CM     Time Frame (Transfer Goal 1, OT) long term goal (LTG);by discharge  -CM     Progress/Outcome (Transfer Goal 1, OT) goal not met  -CM     Row Name 03/16/23 0900          Bathing Goal 1 (OT)    Activity/Device (Bathing Goal 1, OT) upper body bathing  -CM     [place] Level/Cues Needed (Bathing Goal 1, OT) minimum assist (75% or more patient effort)  -CM     Time Frame (Bathing Goal 1, OT) long term goal (LTG);by discharge  -CM     Progress/Outcomes (Bathing Goal 1, OT) goal not met  -CM     Row Name 03/16/23 0900          Dressing Goal 1 (OT)    Activity/Device (Dressing Goal 1, OT) upper body dressing  -CM     [place]/Cues Needed (Dressing Goal 1, OT) set-up required  -CM     Time Frame (Dressing Goal 1, OT) long term goal (LTG);by discharge  -CM     Progress/Outcome (Dressing Goal 1, OT) goal not met  -CM     Row Name 03/16/23 0900           Toileting Goal 1 (OT)    Activity/Device (Toileting Goal 1, OT) toileting skills, all  -CM     [place] Level/Cues Needed (Toileting Goal 1, OT) moderate assist (50-74% patient effort)  -CM     Time Frame (Toileting Goal 1, OT) long term goal (LTG);by discharge  -CM     Progress/Outcome (Toileting Goal 1, OT) goal not met  -CM     Row Name 03/16/23 0900          Problem Specific Goal 1 (OT)    Problem Specific Goal 1 (OT) Pt will tolerate at least 15 minutes BUE therrapeutic exercises with only min fatigue and 3 rest breaks EOB/OOB for increased strength and endurance required for ADLs and mobility.  -CM     Time Frame (Problem Specific Goal 1, OT) long term goal (LTG);by discharge  -CM     Progress/Outcome (Problem Specific Goal 1, OT) goal not met  -CM     Row Name 03/16/23 0900          Therapy Assessment/Plan (OT)    Planned Therapy Interventions (OT) activity tolerance training;adaptive equipment training;BADL retraining;cognitive/visual perception retraining;manual therapy/joint mobilization;IADL retraining;functional balance retraining;neuromuscular control/coordination retraining;edema control/reduction;occupation/activity based interventions;ROM/therapeutic exercise;patient/caregiver education/training;passive ROM/stretching;strengthening exercise;transfer/mobility retraining  -CM           User Key  (r) = Recorded By, (t) = Taken By, (c) = Cosigned By    Initials Name Provider Type    Niya Watts OT Occupational Therapist               Clinical Impression     Row Name 03/16/23 0900          Pain Assessment    Pretreatment Pain Rating 8/10  -CM     Pain Location - Side/Orientation Right  -CM     Pain Location upper  -CM     Pain Location - extremity  -CM     Pre/Posttreatment Pain Comment left distal limb and R foot  -CM     Pain Intervention(s) Repositioned;Medication (See MAR);Ambulation/increased activity;Rest  -CM     Row Name 03/16/23 0900          Plan of Care Review    Plan of Care Reviewed  With patient  -CM     Outcome Evaluation OT eval completed. Co-eval with PT. Pt supine in bed upon arrival. Alert and agreeable to therapy. Pt has a previous left BKA. Pt requested to immediatly get to the BSC to have a bowel movement. Pt required Mod A for sup to sit with HOB raised. Pt with good sitting balance. CGA for initial stand. Min A x2 required for stand pivot t/f to aned from the BSC. Pt sat on the BSC answering PLOF questions for 20 minutes. Pt was dependent for donning shoe and for bowel keyhsawn-care. Pt requested to remain sittng EOB at end of session.  Pt left sitting EOB with RN notified and exit alarm on. Pt presents with impaired safety and (I) with ADLs and mobility. I/P OT will follow. Goals established. Anticipate return to SNF for rehab at d/c.  -     Row Name 03/16/23 0900          Therapy Assessment/Plan (OT)    Patient/Family Therapy Goal Statement (OT) return to [facility] for rehab  -     Rehab Potential (OT) good, to achieve stated therapy goals  -     Criteria for Skilled Therapeutic Interventions Met (OT) yes;meets criteria  -     Therapy Frequency (OT) other (see comments)  5-7 d/wk  -     Predicted Duration of Therapy Intervention (OT) until d/c or all goals met  -     Row Name 03/16/23 0900          Therapy Plan Review/Discharge Plan (OT)    Anticipated Discharge Disposition (OT) skilled nursing facility  -     Row Name 03/16/23 0900          Vital Signs    Post Systolic BP Rehab 156  -CM     Post Treatment Diastolic BP 63  -CM     Pretreatment Heart Rate (beats/min) 91  -CM     Posttreatment Heart Rate (beats/min) 89  -CM     Pre SpO2 (%) 100  -CM     O2 Delivery Pre Treatment room air  -CM     Pre Patient Position Sitting  -CM     Row Name 03/16/23 0900          Positioning and Restraints    Pre-Treatment Position in bed  -CM     Post Treatment Position bed  -CM     In Bed supine;call light within reach;encouraged to call for assist;exit alarm on;side rails up x2  -CM            User Key  (r) = Recorded By, (t) = Taken By, (c) = Cosigned By    Initials Name Provider Type    Niya Watts OT Occupational Therapist               Outcome Measures     Row Name 03/16/23 0900          How much help from another is currently needed...    Putting on and taking off regular lower body clothing? 1  -CM     Bathing (including washing, rinsing, and drying) 2  -CM     Toileting (which includes using toilet bed pan or urinal) 1  -CM     Putting on and taking off regular upper body clothing 2  -CM     Taking care of personal grooming (such as brushing teeth) 3  -CM     Eating meals 4  -CM     AM-PAC 6 Clicks Score (OT) 13  -CM     Row Name 03/16/23 0904 03/16/23 0842       How much help from another person do you currently need...    Turning from your back to your side while in flat bed without using bedrails? 3  -HM 3  -KR    Moving from lying on back to sitting on the side of a flat bed without bedrails? 2  -HM 3  -KR    Moving to and from a bed to a chair (including a wheelchair)? 3  -HM 1  -KR    Standing up from a chair using your arms (e.g., wheelchair, bedside chair)? 3  -HM 1  -KR    Climbing 3-5 steps with a railing? 1  -HM 1  -KR    To walk in hospital room? 1  -HM 1  -KR    AM-PAC 6 Clicks Score (PT) 13  -HM 10  -KR    Highest level of mobility 4 --> Transferred to chair/commode  -HM 4 --> Transferred to chair/commode  -KR    Row Name 03/16/23 0904 03/16/23 0900       Functional Assessment    Outcome Measure Options AM-PAC 6 Clicks Basic Mobility (PT)  -HM AM-PAC 6 Clicks Daily Activity (OT)  -CM          User Key  (r) = Recorded By, (t) = Taken By, (c) = Cosigned By    Initials Name Provider Type    Kinsey Lombardo RN Registered Nurse    Niya Watts OT Occupational Therapist    HM Trang Simmons, STEFFANIE Physical Therapist                Occupational Therapy Education     Title: PT OT SLP Therapies (In Progress)     Topic: Occupational Therapy (In Progress)      Point: ADL training (Done)     Description:   Instruct learner(s) on proper safety adaptation and remediation techniques during self care or transfers.   Instruct in proper use of assistive devices.              Learning Progress Summary           Patient Acceptance, E,TB, VU by CM at 3/16/2023 0959    Comment: OT POC, Role of OT, d/c recommendations                   Point: Home exercise program (Not Started)     Description:   Instruct learner(s) on appropriate technique for monitoring, assisting and/or progressing therapeutic exercises/activities.              Learner Progress:  Not documented in this visit.          Point: Precautions (Done)     Description:   Instruct learner(s) on prescribed precautions during self-care and functional transfers.              Learning Progress Summary           Patient Acceptance, E,TB, VU by CM at 3/16/2023 0959    Comment: OT POC, Role of OT, d/c recommendations                   Point: Body mechanics (Done)     Description:   Instruct learner(s) on proper positioning and spine alignment during self-care, functional mobility activities and/or exercises.              Learning Progress Summary           Patient Acceptance, E,TB, VU by CM at 3/16/2023 0959    Comment: OT POC, Role of OT, d/c recommendations                               User Key     Initials Effective Dates Name Provider Type Discipline    GAEL 11/18/22 -  iNya Slater OT Occupational Therapist OT              OT Recommendation and Plan  Planned Therapy Interventions (OT): activity tolerance training, adaptive equipment training, BADL retraining, cognitive/visual perception retraining, manual therapy/joint mobilization, IADL retraining, functional balance retraining, neuromuscular control/coordination retraining, edema control/reduction, occupation/activity based interventions, ROM/therapeutic exercise, patient/caregiver education/training, passive ROM/stretching, strengthening exercise, transfer/mobility  retraining  Therapy Frequency (OT): other (see comments) (5-7 d/wk)  Plan of Care Review  Plan of Care Reviewed With: patient  Outcome Evaluation: OT eval completed. Co-eval with PT. Pt supine in bed upon arrival. Alert and agreeable to therapy. Pt has a previous left BKA. Pt requested to immediatly get to the BSC to have a bowel movement. Pt required Mod A for sup to sit with HOB raised. Pt with good sitting balance. CGA for initial stand. Min A x2 required for stand pivot t/f to aned from the BSC. Pt sat on the BSC answering PLOF questions for 20 minutes. Pt was dependent for donning shoe and for bowel keyshawn-care. Pt requested to remain sittng EOB at end of session.  Pt left sitting EOB with RN notified and exit alarm on. Pt presents with impaired safety and (I) with ADLs and mobility. I/P OT will follow. Goals established. Anticipate return to SNF for rehab at d/c.     Time Calculation:    Time Calculation- OT     Row Name 03/16/23 1000             Time Calculation- OT    OT Start Time 0900  -CM      OT Stop Time 0955  -CM      OT Time Calculation (min) 55 min  -CM      OT Received On 03/16/23  -CM      OT Goal Re-Cert Due Date 03/29/23  -CM         Untimed Charges    OT Eval/Re-eval Minutes 55  -CM         Total Minutes    Untimed Charges Total Minutes 55  -CM       Total Minutes 55  -CM            User Key  (r) = Recorded By, (t) = Taken By, (c) = Cosigned By    Initials Name Provider Type     Niya Slater OT Occupational Therapist              Therapy Charges for Today     Code Description Service Date Service Provider Modifiers Qty    [id number]  OT EVAL MOD COMPLEXITY 4 3/16/2023 Niya Slater OT GO 1               Niya Slater OT  3/16/2023

## 2023-03-16 NOTE — PLAN OF CARE
Goal Outcome Evaluation:  Plan of Care Reviewed With: patient           Outcome Evaluation: PT Eval completed. Co-Eval w/OT. Patient supine in bed, agreeable to therapy, A&Ox4. Patient required modA for sup>sit w/HOB raised. Good sitting balance and patient required CGA for initial STS to [facility] but required minAx2 for [place] [facility] [place]ed. Verbal cues for sequencing. Increased time due to patient needing to use commode. Patient has fair strength of B hip flex, good strength of R knee. Patient requested to sit EOB w/tray table. Bed alarm on, informed RN. Educated patient on not getting up without assist, patient verbalized agreement. Call light left in reach. Patient would benefit from skilled PT to address decreased strength, balance and functional mobility and return to SNF for rehab to home.

## 2023-03-16 NOTE — DISCHARGE SUMMARY
[facility] Medicine Services  DISCHARGE SUMMARY       Date of Admission: 3/15/2023  Date of Discharge:  3/16/2023  Primary Care Physician: Kiersten Wilson APRN    Presenting Problem/History of Present Illness:  ESRD (end stage renal disease) on dialysis (HCC) [N18.6, Z99.2]  Acute respiratory failure with hypoxia (HCC) [J96.01]  Draining postoperative wound, initial encounter [T81.89XA]     Final Discharge Diagnoses:  Active Hospital Problems    Diagnosis    • **Acute respiratory failure with hypoxia (HCC)    • ESRD on hemodialysis (HCC)    • Type 2 diabetes mellitus with circulatory disorder (HCC)    • CAD (coronary artery disease)    • COPD (chronic obstructive pulmonary disease) (HCC)        Consults:   Consults     Date and Time Order Name Status Description    3/15/2023  3:56 PM Inpatient Nephrology Consult            Procedures Performed:                 Pertinent Test Results:   Lab Results (most recent)     Procedure Component Value Units Date/Time    POC Glucose Once [[id number]]  (Abnormal) Collected: 03/16/23 1045    Specimen: Blood Updated: 03/16/23 1136     Glucose 351 mg/dL      Comment: RN NotifiedOperator: [id number] BRANNON LAKHANIANNAMeter ID: [id number]       CBC Auto Differential [[id number]]  (Abnormal) Collected: 03/16/23 0651    Specimen: Blood Updated: 03/16/23 0753     WBC 10.22 10*3/mm3      RBC 4.00 10*6/mm3      Hemoglobin 11.4 g/dL      Hematocrit 36.7 %      MCV 91.8 fL      MCH 28.5 pg      MCHC 31.1 g/dL      RDW 16.2 %      RDW-SD 54.3 fl      MPV 8.5 fL      Platelets 278 10*3/mm3      Neutrophil % 60.9 %      Lymphocyte % 28.5 %      Monocyte % 6.3 %      Eosinophil % 2.3 %      Basophil % 0.9 %      Immature Grans % 1.1 %      Neutrophils, Absolute 6.23 10*3/mm3      Lymphocytes, Absolute 2.91 10*3/mm3      Monocytes, Absolute 0.64 10*3/mm3      Eosinophils, Absolute 0.24 10*3/mm3      Basophils, Absolute 0.09 10*3/mm3       Immature Grans, Absolute 0.11 10*3/mm3      nRBC 0.0 /100 WBC     Comprehensive Metabolic Panel [[id number]]  (Abnormal) Collected: 03/16/23 0651    Specimen: Blood Updated: 03/16/23 0748     Glucose 238 mg/dL      BUN 55 mg/dL      Creatinine 4.14 mg/dL      Sodium 132 mmol/L      Potassium 4.3 mmol/L      Chloride 93 mmol/L      CO2 19.0 mmol/L      Calcium 9.7 mg/dL      Total Protein 8.3 g/dL      Albumin 3.3 g/dL      ALT (SGPT) <5 U/L      AST (SGOT) 10 U/L      Alkaline Phosphatase 239 U/L      Total Bilirubin 0.3 mg/dL      Globulin 5.0 gm/dL      A/G Ratio 0.7 g/dL      BUN/Creatinine Ratio 13.3     Anion Gap 20.0 mmol/L      eGFR 11.5 mL/min/1.73      Comment: <15 Indicative of kidney failure       Narrative:      GFR Normal >60  Chronic Kidney Disease <60  Kidney Failure <15      POC Glucose Once [[id number]]  (Abnormal) Collected: 03/16/23 0710    Specimen: Blood Updated: 03/16/23 0742     Glucose 284 mg/dL      Comment: RN NotifiedOperator: [id number] BRANNON LAKHANIANNAMeter ID: [id number]       Basic Metabolic Panel [[id number]]  (Abnormal) Collected: 03/15/23 1421    Specimen: Blood Updated: 03/15/23 1458     Glucose 102 mg/dL      BUN 74 mg/dL      Creatinine 4.80 mg/dL      Sodium 135 mmol/L      Potassium 4.0 mmol/L      Chloride 100 mmol/L      CO2 22.0 mmol/L      Calcium 10.0 mg/dL      BUN/Creatinine Ratio 15.4     Anion Gap 13.0 mmol/L      eGFR 9.6 mL/min/1.73      Comment: <15 Indicative of kidney failure       Narrative:      GFR Normal >60  Chronic Kidney Disease <60  Kidney Failure <15      CBC & Differential [[id number]]  (Abnormal) Collected: 03/15/23 1421    Specimen: Blood Updated: 03/15/23 1438    Narrative:      The following orders were created for panel order CBC & Differential.  Procedure                               Abnormality         Status                     ---------                               -----------         ------                     CBC Auto Differential[[id number]]         Abnormal            Final result                 Please view results for these tests on the individual orders.    CBC Auto Differential [[id number]]  (Abnormal) Collected: 03/15/23 1421    Specimen: Blood Updated: 03/15/23 1438     WBC 9.95 10*3/mm3      RBC 3.62 10*6/mm3      Hemoglobin 10.3 g/dL      Hematocrit 32.7 %      MCV 90.3 fL      MCH 28.5 pg      MCHC 31.5 g/dL      RDW 16.1 %      RDW-SD 52.7 fl      MPV 8.4 fL      Platelets 311 10*3/mm3      Neutrophil % 67.3 %      Lymphocyte % 22.6 %      Monocyte % 5.6 %      Eosinophil % 2.9 %      Basophil % 0.7 %      Immature Grans % 0.9 %      Neutrophils, Absolute 6.69 10*3/mm3      Lymphocytes, Absolute 2.25 10*3/mm3      Monocytes, Absolute 0.56 10*3/mm3      Eosinophils, Absolute 0.29 10*3/mm3      Basophils, Absolute 0.07 10*3/mm3      Immature Grans, Absolute 0.09 10*3/mm3      nRBC 0.0 /100 WBC         Imaging Results (Most Recent)     Procedure Component Value Units Date/Time    XR Chest 1 View [[id number]] Collected: 03/15/23 1401     Updated: 03/15/23 1452    Narrative:      PROCEDURE: Portable chest x-ray    TECHNIQUE: Single frontal view of the chest    COMPARISON: Chest x-ray 12/16/2022    HISTORY: amayaa, J96.01 Acute respiratory failure with hypoxia N18.6  End stage renal disease Z99.2 Dependence on renal dialysis  T81.89XA Other complications of procedures, not elsewhere  classified, initial encounter    FINDINGS: Right-sided hemodialysis catheter appears stable in  position.  No overt pulmonary vascular congestion, focal pulmonary  parenchymal opacity, pleural effusion or pneumothorax. Cardiac  silhouette is enlarged. Sternal suture wires appear stable.  Surgical clips again noted in the left neck.      Impression:      No acute pulmonary disease.    Electronically signed by:  Umesh Parra MD  3/15/2023 2:49 PM CDT  Workstation: [id number]          Chief Complaint on Day of Discharge: No new complaints    Hospital Course:  The patient is a  63 y.o. female with past medical history notable for CAD, end-stage renal disease on hemodialysis, type 2 diabetes mellitus, GERD, previous history of GI bleed, peripheral vascular disease, and hypertension who presented to [facility] due to missed dialysis sessions related to difficulties with recently created AV fistula.  Patient was admitted and started on hemodialysis on the day of admission for short session.  Dialysis was arranged for the day following admission and patient was otherwise noted to be hemodynamically stable.  She noted no complaints or new concerns and will therefore be discharged back to [facility] for ongoing rehab after her dialysis session is completed on the day of discharge.      Condition on Discharge: Stable    Physical Exam on Discharge:  /68 (BP Location: Left arm, Patient Position: Lying)   Pulse 73   Temp 97.3 °F (36.3 °C) (Oral)   Resp 18   Wt 101 kg (222 lb 8 oz)   LMP  (LMP Unknown)   SpO2 97%   BMI 40.70 kg/m²   Physical Exam  Constitutional:       General: She is not in acute distress.     Appearance: She is not toxic-appearing.   HENT:      Head: Normocephalic and atraumatic.      Right Ear: External ear normal.      Left Ear: External ear normal.      Nose: Nose normal.      Mouth/Throat:      Pharynx: Oropharynx is clear.   Eyes:      Conjunctiva/sclera: Conjunctivae normal.   Cardiovascular:      Rate and Rhythm: Normal rate and regular rhythm.      Heart sounds: Normal heart sounds.   Pulmonary:      Effort: Pulmonary effort is normal. No respiratory distress.      Breath sounds: Normal breath sounds.   Abdominal:      General: Bowel sounds are normal.      Palpations: Abdomen is soft.      Tenderness: There is no abdominal tenderness.   Skin:     General: Skin is warm and dry.      Capillary Refill: Capillary refill takes less than 2 seconds.   Neurological:      General: No focal deficit present.      Mental Status: She is alert and oriented to  "person, place, and time. Mental status is at baseline.   Psychiatric:         Behavior: Behavior normal.         Thought Content: Thought content normal.       Discharge Disposition:  Skilled Nursing Facility (DC - External)    Discharge Medications:     Discharge Medications      Continue These Medications      Instructions Start Date   acetaminophen 650 MG 8 hr tablet  Commonly known as: Tylenol 8 Hour   650 mg, Oral, Every 8 Hours PRN      albuterol sulfate  (90 Base) MCG/ACT inhaler  Commonly known as: PROVENTIL HFA;VENTOLIN HFA;PROAIR HFA   2 puffs, Inhalation, Every 4 Hours PRN      albuterol (2.5 MG/3ML) 0.083% nebulizer solution  Commonly known as: PROVENTIL   Inhale the contents of 1 vial by nebulization Every 4 (Four) Hours As Needed for Wheezing.      atorvastatin 40 MG tablet  Commonly known as: LIPITOR   40 mg, Oral, Daily      carvedilol 6.25 MG tablet  Commonly known as: COREG   6.25 mg, Oral, 2 Times Daily With Meals      Cetirizine HCl 10 MG capsule   1 capsule, Oral, Daily      clopidogrel 75 MG tablet  Commonly known as: PLAVIX   75 mg, Oral, Daily      [employer] Blood Glucose Meter w/Device kit   1 each, Does not apply, 3 Times Daily      cyclobenzaprine 5 MG tablet  Commonly known as: FLEXERIL   5 mg, Oral, 2 Times Daily      Diclofenac Sodium 1 % gel gel  Commonly known as: VOLTAREN   4 g, Topical, 4 Times Daily PRN      docusate sodium 100 MG capsule  Commonly known as: COLACE   100 mg, Oral, 2 Times Daily      DULoxetine 20 MG capsule  Commonly known as: CYMBALTA   20 mg, Oral, Daily      Easy Touch Insulin Syringe 30G X 5/16\" 0.5 ML misc  Generic drug: Insulin Syringe-Needle U-100   USE AS DIRECTED WITH LEVEMIR      Easy Touch Pen Needles 31G X 8 MM misc  Generic drug: Insulin Pen Needle   No dose, route, or frequency recorded.      NovoFine 30G X 8 MM misc  Generic drug: Insulin Pen Needle   As directed 4 times daily      B-D ULTRAFINE III SHORT PEN 31G X 8 MM misc  Generic drug: Insulin " Pen Needle   Use to inject insulin 4 (Four) Times a Day as directed.      furosemide 20 MG tablet  Commonly known as: LASIX   20 mg, Oral, Daily      gabapentin 300 MG capsule  Commonly known as: NEURONTIN   300 mg, Oral, Daily      hydrOXYzine 25 MG tablet  Commonly known as: ATARAX   25 mg, Oral, Every 8 Hours PRN      Insulin Lispro (1 Unit Dial) 100 UNIT/ML solution pen-injector  Commonly known as: HUMALOG   14-35 Units, Subcutaneous, 3 Times Daily With Meals, Takes 14 units with meals plus sliding scale Sliding scale:  150-199 take 4 units 200-249 take 8 units 250-299 take 12 units 300-349 take 16 units 350-400 take 20 units greater than 400, call physician      ipratropium-albuterol 0.5-2.5 mg/3 ml nebulizer  Commonly known as: DUO-NEB   3 mL, Nebulization, 4 Times Daily PRN      losartan 25 MG tablet  Commonly known as: COZAAR   25 mg, Oral, Daily      memantine 5 MG tablet  Commonly known as: NAMENDA   5 mg, Oral, 2 Times Daily      nitroglycerin 0.4 MG SL tablet  Commonly known as: NITROSTAT   0.4 mg, Sublingual, Every 5 Minutes PRN      O2  Commonly known as: OXYGEN   2 L/min, Inhalation, Continuous      ondansetron 4 MG tablet  Commonly known as: ZOFRAN   4 mg, Oral, Every 6 Hours PRN      oxyCODONE 10 MG tablet  Commonly known as: ROXICODONE   10 mg, Oral, 4 Times Daily PRN      pantoprazole 40 MG EC tablet  Commonly known as: PROTONIX   40 mg, Oral, Nightly      promethazine 25 MG tablet  Commonly known as: PHENERGAN   25 mg, Oral, Every 6 Hours PRN      sevelamer 800 MG tablet  Commonly known as: RENVELA   800 mg, Oral, 3 Times Daily With Meals      Vitamin D (Ergocalciferol) 72397 units capsule   50,000 Units, Oral, Weekly, Take on Wednesday             Discharge Diet:   Diet Instructions     Diet: Cardiac Diets, Diabetic Diets, High Protein Diet, Nothing By Mouth, Renal Diets; Low Sodium (2g); Consistent Carbohydrate; Low Sodium (2-3g), Low Potassium, Low Phosphorus; Texture: Regular Texture (IDDSI  7); Fluid Consistency: Thin (IDDSI 0...      Discharge Diet:  Cardiac Diets  Diabetic Diets  High Protein Diet  Nothing By Mouth  Renal Diets       Cardiac Diet: Low Sodium (2g)    Diabetic Diet: Consistent Carbohydrate    Renal Diet:  Low Sodium (2-3g)  Low Potassium  Low Phosphorus       Texture: Regular Texture (IDDSI 7)    Fluid Consistency: Thin (IDDSI 0)          Activity at Discharge:   Activity Instructions     Other Activity Instructions      Activity Instructions: per therapy at SNF          Discharge Care Plan/Instructions: Continue medications as prescribed, follow-up with providers as already scheduled, and return for worsening symptoms.    Follow-up Appointments:   Future Appointments   Date Time Provider Department Center   3/17/2023  8:40 AM Huy White MD [facility]   3/17/2023 10:45 AM [facility]       Test Results Pending at Discharge: None      Esau Ferguson MD    Time: 30 minutes

## 2023-03-16 NOTE — THERAPY EVALUATION
Patient Name: Yamileth Slater  : 1960    MRN: [id number]                              Today's Date: 3/16/2023       Admit Date: 3/15/2023    Visit Dx:     ICD-10-CM ICD-9-CM   1. Acute respiratory failure with hypoxia ([facility])  J96.01 518.81   2. ESRD (end stage renal disease) on dialysis ([facility])  N18.6 585.6    Z99.2 V45.11   3. Draining postoperative wound, initial encounter  T81.89XA 998.89   4. Impaired functional mobility, balance, gait, and endurance  Z74.09 V49.89   5. Impaired mobility and ADLs  Z74.09 V49.89    Z78.9      Patient Active Problem List   Diagnosis   • Chronic midline low back pain without sciatica   • Vitamin D deficiency   • Tobacco dependence syndrome   • Shoulder joint pain   • Morbid obesity with BMI of 50.0-59.9, adult ([facility])   • Mixed hyperlipidemia   • Kidney stone   • History of colon polyps   • GERD (gastroesophageal reflux disease)   • Excoriated eczema   • Hypertension   • COPD (chronic obstructive pulmonary disease) ([facility])   • Chronic folliculitis   • Coronary artery disease involving native coronary artery of native heart without angina pectoris   • 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   • Hypothyroidism   • Carotid artery disease ([facility])   • [place] abuse   • PAD (peripheral artery disease) ([facility])   • Venous insufficiency   • LAFB (left anterior fascicular block)   • Pulmonary hypertension ([facility])   • Left hip pain   • Neck pain   • MIKE and COPD overlap syndrome ([facility])   • Pneumonia due to COVID-19 virus   • Hyperkalemia   • Cutaneous candidiasis   • Community acquired pneumonia of right middle lobe of lung   • MRSA infection   • Esophageal dysphagia   • Monilial esophagitis ([facility])   • NSTEMI, initial episode of care ([facility])   • CAD (coronary artery disease)   • Cellulitis of foot associated with diabetes mellitus ([facility])   • Urinary tract infection due to Proteus   • History of insertion of tunneled central venous catheter (CVC) with port   • Anemia due to  chronic kidney disease, on chronic dialysis ([facility])   • Pneumonitis   • Personal history of noncompliance with medical treatment, presenting hazards to health   • Type 2 diabetes mellitus with circulatory disorder ([facility])   • Physical deconditioning   • ESRD on hemodialysis ([facility])   • DVT prophylaxis   • Anemia   • Ventilator associated pneumonia ([facility])   • Positive fecal occult blood test   • Yeast UTI   • Gangrene of both feet ([facility])   • Left foot pain   • 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 ([facility])   • On home oxygen therapy   • Subacute osteomyelitis of right foot ([facility])   • Acute respiratory failure with hypoxia ([facility])     Past Medical History:   Diagnosis Date   • Acute blood loss anemia 04/16/2017    Likely due to gastric oozing at this time. - Dr. Duarte (GI) was consulted and has now signed off, will follow up outpatient - pill colonoscopy showed AVMs - continue to monitor   • Acute cystitis with hematuria 03/31/2021 1/13: IV Rocephin 1 gm q 24 1/14 : transitioned  to omnicef 300mg. Urine cultures resulted and did not show growth. Omnicef discontinued as patient is asymptomatic   • Altered mental status 01/09/2022    - AMS on presentation - initial ABG pH 7.3, CO2 34 - Procal 0.29 - UA negative for acute cysitits -CTA head wnl  - Empiric Zosyn and Vancomycin -Lactate 2.5 on admission  - blood cultures no growth at 24 hours     • Anxiety    • CAD (coronary artery disease) 04/24/2021    S/P 3 stents 5/1/2021 for [facility]HL Continue ASA 81mg & Clopidogrel 75mg Continue Atorvastatin 40mg   • Carotid artery stenosis    • CHF (congestive heart failure) ([facility])    • Chronic obstructive lung disease ([facility])    • CKD (chronic kidney disease) stage 4, GFR 15-29 ml/min ([facility])    • CKD (chronic kidney disease), symptom management only, stage 5 ([facility]) 10/05/2020    Results from last 7 days Lab Units 12/15/21 0548 12/14/21 1323 12/14/21 0916 CREATININE mg/dL 3.92* 3.21* 3.32*  Baseline creatinine 2-3 GFR 13-25 GFR 15 Dialysis  MWF, sees Dr. Lauren Nephrology consult,, appreciate recommendations Continue Bumex 1mg bid daily Holding Bumex 2mg 4 times a week   • Colonic polyp    • Coronary arteriosclerosis    • Diabetes mellitus (HCC)    • Diabetic neuropathy (HCC)    • Ear pain, right 10/18/2021    - canal trauma due to patient scratching and DMT2 - added cortisporin ear drops   • Elevated troponin 10/12/2021    -most likely from CKD -Trending down -Neg chest pain   • Generalized abdominal pain 07/01/2022    Could be due to initiation of tube feeds vs dyspepsia vs abdominal cramps related to no PO intake due to intubation vs constipation Continue current laxative regiment  If no bowel movement by this afternoon will consider enema   • GERD (gastroesophageal reflux disease)    • GI bleed 05/13/2021    - GI will follow up outpatient - Protonix 40mg daily - Avoid medical DVT prophy and use mechanical at this time instead. - Continue to monitor - pill colonoscopy results showed AVMs   • History of transfusion    • Hypercholesterolemia    • Hyperosmolar hyperglycemic state (HHS) ([facility]) 06/25/2022    Serum glucose 605 on admission  Anion gap 16 PH 7.37 Bicarb 27.9 Continue fluids  Insulin drip with [facility] protocol  Anion gap closed around 10 AM, received one dose of Levamir subq, will stop insulin drip after 2 hrs     • Hypertension    • Hypokalemia 05/27/2022    Will replace as needed. Will be cautious in the setting of ESRD to avoid need for emergency dialysis   Monitor Qtc intervals on EKG     • Hypomagnesemia 06/27/2021    Monitor and replace   • Morbid obesity ([facility])    • Nephrolithiasis    • On mechanically assisted ventilation ([facility]) 06/26/2022    Vent management and sedation orders placed.  - [facility] intensivist group consulted for vent management appreciate recommendations  - plan to extubate today     • Peripheral vascular disease (HCC)    • Pleural effusion on right 06/26/2022    CXR on 6/30/22 read as a small upper left pulmonary  edema vs early pneumonia.  Last Echo 1/2022 EF 61-65 % Continue to monitor  Procal slightly improved, CRP improved On Linezolid and meropenum      • PVD (peripheral vascular disease) ([facility])    • SIRS (systemic inflammatory response syndrome) ([facility]) 01/09/2022    Admission  - WBC 17.78   -   - RR 16 - 1/10: VSS/wnl - CXR - Mild pulm edema - Blood cultures no growth at 24 hours  - Procalcitonin 0.29 - UA : glucose 1000, negative Leucocytes/nitrate - Empiric Zosyn and Vancomcyin    • Sleep apnea    • Substance abuse ([facility])    • Vitamin D deficiency      Past Surgical History:   Procedure Laterality Date   • AMPUTATION DIGIT Right 2/27/2023    Procedure: Excision of right first metatarsal head, right second toe amputation;  Surgeon: Jean Oliveira DPM;  Location: [facility];  Service: Podiatry;  Laterality: Right;   • BELOW KNEE AMPUTATION Left 12/16/2022    Procedure: AMPUTATION BELOW KNEE, LEFT;  Surgeon: Hyu White MD;  Location: [facility];  Service: Orthopedics;  Laterality: Left;   • CARDIAC CATHETERIZATION N/A 07/14/2020   • CARDIAC CATHETERIZATION N/A 04/23/2021    Procedure: Left Heart Cath;  Surgeon: Melba Romo MD;  Location: [facility] INVASIVE LOCATION;  Service: Cardiology;  Laterality: N/A;   • CARDIAC CATHETERIZATION N/A 04/30/2021    Procedure: Percutaneous Coronary Intervention;  Surgeon: Russell Voss MD;  Location: [facility] CATH INVASIVE LOCATION;  Service: Cardiovascular;  Laterality: N/A;   • CARDIAC CATHETERIZATION N/A 04/30/2021    Procedure: Stent NIKKI coronary;  Surgeon: Russell Voss MD;  Location: [facility] CATH INVASIVE LOCATION;  Service: Cardiovascular;  Laterality: N/A;   • CARDIAC CATHETERIZATION Left 11/13/2021    Procedure: Left Heart Cath;  Surgeon: Niall Rios MD;  Location: [facility] CATH INVASIVE LOCATION;  Service: Cardiology;  Laterality: Left;   • CAROTID STENT Left    • COLONOSCOPY     • COLONOSCOPY N/A 05/14/2021    Procedure:  COLONOSCOPY;  Surgeon: Mingo Duarte MD;  Location: [facility] ENDOSCOPY;  Service: Gastroenterology;  Laterality: N/A;   • CORONARY ARTERY BYPASS GRAFT N/A 2013    CABG X 3   • CYSTOSCOPY BLADDER STONE LITHOTRIPSY Bilateral    • ENDOSCOPY N/A 04/12/2021    Procedure: ESOPHAGOGASTRODUODENOSCOPY;  Surgeon: Mingo Duarte MD;  Location: [facility] ENDOSCOPY;  Service: Gastroenterology;  Laterality: N/A;   • ENDOSCOPY N/A 05/14/2021    Procedure: ESOPHAGOGASTRODUODENOSCOPY;  Surgeon: Mingo Duarte MD;  Location: [facility] ENDOSCOPY;  Service: Gastroenterology;  Laterality: N/A;   • ENDOSCOPY N/A 01/28/2022    Procedure: ESOPHAGOGASTRODUODENOSCOPY;  Surgeon: Mingo Duarte MD;  Location: [facility] ENDOSCOPY;  Service: Gastroenterology;  Laterality: N/A;   • HYSTERECTOMY     • INTERVENTIONAL RADIOLOGY PROCEDURE N/A 10/21/2021    Procedure: tunneled central venous catheter placement;  Surgeon: Donnie Robles MD;  Location: [facility] INVASIVE LOCATION;  Service: Interventional Radiology;  Laterality: N/A;   • INTERVENTIONAL RADIOLOGY PROCEDURE N/A 01/27/2022    Procedure: tunneled central venous catheter placement;  Surgeon: Donnie Robles MD;  Location: [facility] INVASIVE LOCATION;  Service: Interventional Radiology;  Laterality: N/A;   • LAPAROSCOPIC TUBAL LIGATION     • TUNNELED VENOUS CATHETER PLACEMENT        General Information     Row Name 03/16/23 0904          Physical Therapy Time and Intention    Document Type evaluation  -     Mode of Treatment co-treatment;physical therapy;occupational therapy  -     Row Name 03/16/23 0904          General Information    Patient Profile Reviewed yes  -HM     Prior Level of Function min assist:;transfer;bed mobility;mod assist:;dressing;dependent:;bathing  -     Existing Precautions/Restrictions fall  -     Barriers to Rehab medically complex;previous functional deficit  -     Row Name 03/16/23 0904          Living Environment    People in  Home facility resident;other (see comments)  Patient is at [facility] for rehab to home s/p L BKA  -     Row Name 03/16/23 0904          Stairs Within Home, Primary    Stairs, Within Home, Primary Has been doing stand pivot transfers at [facility]  -     Row Name 03/16/23 0904          Cognition    Orientation Status (Cognition) oriented x 4  -HM     Row Name 03/16/23 0904          Safety Issues, Functional Mobility    Safety Issues Affecting Function (Mobility) positioning of assistive device;judgment;sequencing abilities  -     Impairments Affecting Function (Mobility) balance;endurance/activity tolerance;strength  -           User Key  (r) = Recorded By, (t) = Taken By, (c) = Cosigned By    Initials Name Provider Type     Trang Simmons PT Physical Therapist               Mobility     Row Name 03/16/23 0904          Bed Mobility    Bed Mobility supine-sit  -     Supine-Sit [place] (Bed Mobility) moderate assist (50% patient effort)  -     Assistive Device (Bed Mobility) bed rails;head of bed elevated  -     Row Name 03/16/23 0904          Bed-Chair Transfer    Bed-Chair [place] (Transfers) minimum assist (75% patient effort);2 person assist  -     Assistive Device (Bed-Chair Transfers) walker, front-wheeled  -     Comment, (Bed-Chair Transfer) Stand pivot transfer  -     Row Name 03/16/23 0904          Sit-Stand Transfer    Sit-Stand [place] (Transfers) contact guard;minimum assist (75% patient effort)  -     Assistive Device (Sit-Stand Transfers) walker, 4-wheeled  -HM           User Key  (r) = Recorded By, (t) = Taken By, (c) = Cosigned By    Initials Name Provider Type     Trang Simmons PT Physical Therapist               Obj/Interventions     Row Name 03/16/23 0904          Range of Motion Comprehensive    General Range of Motion bilateral lower extremity ROM WFL  -     Comment, General Range of Motion L BKA  -HM     Row Name 03/16/23 0904          Strength  Comprehensive (MMT)    General Manual Muscle Testing (MMT) Assessment lower extremity strength deficits identified  -     Comment, General Manual Muscle Testing (MMT) Assessment B hip flex 4/5; R knee flex/ext 5/5  -     Row Name 03/16/23 0904          Balance    Balance Assessment sitting static balance;sitting dynamic balance;standing static balance  -     Static Sitting Balance modified independence  -HM     Dynamic Sitting Balance standby assist  -HM     Position, Sitting Balance sitting in chair;sitting edge of bed  -     Static Standing Balance contact guard  -     Position/Device Used, Standing Balance walker, front-wheeled  -     Row Name 03/16/23 0904          Sensory Assessment (Somatosensory)    Sensory Assessment (Somatosensory) LE sensation intact  -     Row Name 03/16/23 0904          Lower Extremity (Manual Muscle Testing)    Comment, MMT: Lower Extremity Reports decreased sensation R foot but unable to test due to bandages  -           User Key  (r) = Recorded By, (t) = Taken By, (c) = Cosigned By    Initials Name Provider Type     Trang Simmons, PT Physical Therapist               Goals/Plan     Row Name 03/16/23 0904          Bed Mobility Goal 1 (PT)    Activity/Assistive Device (Bed Mobility Goal 1, PT) bed mobility activities, all  -HM     [place] Level/Cues Needed (Bed Mobility Goal 1, PT) contact guard required  -     Time Frame (Bed Mobility Goal 1, PT) by discharge  -     Progress/Outcomes (Bed Mobility Goal 1, PT) new goal;goal not met  -     Row Name 03/16/23 0904          Transfer Goal 1 (PT)    Activity/Assistive Device (Transfer Goal 1, PT) sit-to-stand/stand-to-sit;bed-to-chair/chair-to-bed;wheelchair transfer;commode chair  -     [place] Level/Cues Needed (Transfer Goal 1, PT) standby assist  -     Time Frame (Transfer Goal 1, PT) by discharge  -     Progress/Outcome (Transfer Goal 1, PT) new goal;goal not met  -     Row Name 03/16/23 0904           Problem Specific Goal 1 (PT)    Problem Specific Goal 1 (PT) Improve B hip flex strength to 4+/5  -HM     Time Frame (Problem Specific Goal 1, PT) by discharge  -     Progress/Outcome (Problem Specific Goal 1, PT) new goal;goal not met  -     Row Name 03/16/23 0904          Therapy Assessment/Plan (PT)    Planned Therapy Interventions (PT) balance training;bed mobility training;gait training;neuromuscular re-education;patient/family education;postural re-education;prosthetic fitting/training;strengthening;stretching;transfer training;wheelchair management/propulsion training  -           User Key  (r) = Recorded By, (t) = Taken By, (c) = Cosigned By    Initials Name Provider Type     Trang Simmons, PT Physical Therapist               Clinical Impression     Row Name 03/16/23 0904          Pain    Pretreatment Pain Rating 8/10  -HM     Posttreatment Pain Rating 8/10  -HM     Pain Location - Side/Orientation Right  -HM     Pain Location upper  -     Pain Location - extremity  -     Pre/Posttreatment Pain Comment left distal limb and R foot  -     Pain Intervention(s) Ambulation/increased activity;Rest;Medication (See MAR);Repositioned  -     Row Name 03/16/23 0904          Plan of Care Review    Plan of Care Reviewed With patient  -HM     Outcome Evaluation PT Eval completed. Co-Eval w/OT. Patient supine in bed, agreeable to therapy, A&Ox4. Patient required modA for sup>sit w/HOB raised. Good sitting balance and patient required CGA for initial STS to FWW but required minAx2 for [place] [facility] [place]. Verbal cues for sequencing. Increased time due to patient needing to use commode. Patient has fair strength of B hip flex, good strength of R knee. Patient requested to sit EOB w/tray table. Bed alarm on, informed RN. Educated patient on not getting up without assist, patient verbalized agreement. Call light left in reach. Patient would benefit from skilled PT to  address decreased strength, balance and functional mobility and return to SNF for rehab to home.  -     Row Name 03/16/23 0904          Therapy Assessment/Plan (PT)    Rehab Potential (PT) good, to achieve stated therapy goals  -     Criteria for Skilled Interventions Met (PT) yes  -     Therapy Frequency (PT) other (see comments)  5-7 d/wk  -     Row Name 03/16/23 0904          Vital Signs    Post Systolic BP Rehab 156  -HM     Post Treatment Diastolic BP 63  -HM     Pretreatment Heart Rate (beats/min) 91  -HM     Posttreatment Heart Rate (beats/min) 89  -HM     Pre SpO2 (%) 100  -HM     O2 Delivery Pre Treatment room air  -     Pre Patient Position Supine  -     Intra Patient Position Standing  -HM     Post Patient Position Supine  -     Row Name 03/16/23 0904          Positioning and Restraints    Pre-Treatment Position in bed  -     Post Treatment Position bed  -     In Bed call light within reach;encouraged to call for assist;exit alarm on;sitting EOB  -           User Key  (r) = Recorded By, (t) = Taken By, (c) = Cosigned By    Initials Name Provider Type     Trang Simmons, PT Physical Therapist               Outcome Measures     Row Name 03/16/23 0904 03/16/23 0842       How much help from another person do you currently need...    Turning from your back to your side while in flat bed without using bedrails? 3  - 3  -KR    Moving from lying on back to sitting on the side of a flat bed without bedrails? 2  - 3  -KR    Moving to and from a bed to a chair (including a wheelchair)? 3  - 1  -KR    Standing up from a chair using your arms (e.g., wheelchair, bedside chair)? 3  - 1  -KR    Climbing 3-5 steps with a railing? 1  - 1  -KR    To walk in hospital room? 1  - 1  -KR    AM-PAC 6 Clicks Score (PT) 13  - 10  -KR    Highest level of mobility 4 --> Transferred to chair/commode  - 4 --> Transferred to chair/commode  -KR    Row Name 03/16/23 0904 03/16/23 0900        Functional Assessment    Outcome Measure Options AM-PAC 6 Clicks Basic Mobility (PT)  - AM-PAC 6 Clicks Daily Activity (OT)  -          User Key  (r) = Recorded By, (t) = Taken By, (c) = Cosigned By    Initials Name Provider Type    Kinsey Lombardo, RN Registered Nurse    Niya Watts, OT Occupational Therapist     Trang Simmons, PT Physical Therapist                             Physical Therapy Education     Title: PT OT SLP Therapies (In Progress)     Topic: Physical Therapy (In Progress)     Point: Mobility training (Done)     Learning Progress Summary           Patient Acceptance, E, VU by  at 3/16/2023 0937    Comment: POC and goals                   Point: Home exercise program (Not Started)     Learner Progress:  Not documented in this visit.          Point: Body mechanics (Not Started)     Learner Progress:  Not documented in this visit.          Point: Precautions (Not Started)     Learner Progress:  Not documented in this visit.                      User Key     Initials Effective Dates Name Provider Type Discipline     01/09/23 -  Trang Simmons, PT Physical Therapist PT              PT Recommendation and Plan  Planned Therapy Interventions (PT): balance training, bed mobility training, gait training, neuromuscular re-education, patient/family education, postural re-education, prosthetic fitting/training, strengthening, stretching, transfer training, wheelchair management/propulsion training  Plan of Care Reviewed With: patient  Outcome Evaluation: PT Eval completed. Co-Eval w/OT. Patient supine in bed, agreeable to therapy, A&Ox4. Patient required modA for sup>sit w/HOB raised. Good sitting balance and patient required CGA for initial STS to FWW but required minAx2 [place] [facility] [place]. Verbal cues for sequencing. Increased time due to patient needing to use commode. Patient has fair strength of B hip flex, good strength of R knee. Patient  requested to sit EOB w/tray table. Bed alarm on, informed RN. Educated patient on not getting up without assist, patient verbalized agreement. Call light left in reach. Patient would benefit from skilled PT to address decreased strength, balance and functional mobility and return to SNF for rehab to home.     Time Calculation:    PT Charges     Row Name 03/16/23 0904             Time Calculation    Start Time 0904  -HM      Stop Time 1006  -HM      Time Calculation (min) 62 min  -HM      PT Received On 03/16/23  -      PT Goal Re-Cert Due Date 03/29/23  -         Untimed Charges    PT Eval/Re-eval Minutes 62  -HM         Total Minutes    Untimed Charges Total Minutes 62  -HM       Total Minutes 62  -HM            User Key  (r) = Recorded By, (t) = Taken By, (c) = Cosigned By    Initials Name Provider Type     Trang Simmons, STEFFANIE Physical Therapist              Therapy Charges for Today     Code Description Service Date Service Provider Modifiers Qty    [id number] HC PT EVAL MOD COMPLEXITY 4 3/16/2023 Trang Simmons, PT GP 1          PT G-Codes  Outcome Measure Options: AM-PAC 6 Clicks Basic Mobility (PT)  AM-PAC 6 Clicks Score (PT): 13  AM-PAC 6 Clicks Score (OT): 13  PT Discharge Summary  Anticipated Discharge Disposition (PT): skilled nursing facility    Trang Simmons PT  3/16/2023

## 2023-03-17 ENCOUNTER — OUTSIDE FACILITY SERVICE (OUTPATIENT)
Dept: PODIATRY | Facility: CLINIC | Age: 63
End: 2023-03-17
Payer: MEDICARE

## 2023-03-17 ENCOUNTER — OFFICE VISIT (OUTPATIENT)
Dept: ORTHOPEDIC SURGERY | Facility: CLINIC | Age: 63
End: 2023-03-17
Payer: MEDICARE

## 2023-03-17 ENCOUNTER — OFFICE VISIT (OUTPATIENT)
Dept: WOUND CARE | Facility: HOSPITAL | Age: 63
End: 2023-03-17
Payer: MEDICARE

## 2023-03-17 VITALS — HEIGHT: 62 IN | WEIGHT: 222 LBS | BODY MASS INDEX: 40.85 KG/M2

## 2023-03-17 DIAGNOSIS — N18.6 CKD (CHRONIC KIDNEY DISEASE) REQUIRING CHRONIC DIALYSIS: ICD-10-CM

## 2023-03-17 DIAGNOSIS — I25.10 CORONARY ARTERY DISEASE INVOLVING NATIVE CORONARY ARTERY OF NATIVE HEART WITHOUT ANGINA PECTORIS: ICD-10-CM

## 2023-03-17 DIAGNOSIS — I73.9 PAD (PERIPHERAL ARTERY DISEASE): ICD-10-CM

## 2023-03-17 DIAGNOSIS — Z99.2 CKD (CHRONIC KIDNEY DISEASE) REQUIRING CHRONIC DIALYSIS: ICD-10-CM

## 2023-03-17 DIAGNOSIS — G47.33 OSA AND COPD OVERLAP SYNDROME: ICD-10-CM

## 2023-03-17 DIAGNOSIS — J44.9 OSA AND COPD OVERLAP SYNDROME: ICD-10-CM

## 2023-03-17 DIAGNOSIS — I27.20 PULMONARY HYPERTENSION: ICD-10-CM

## 2023-03-17 DIAGNOSIS — E66.01 MORBID OBESITY WITH BMI OF 45.0-49.9, ADULT: ICD-10-CM

## 2023-03-17 DIAGNOSIS — Z89.512 S/P BKA (BELOW KNEE AMPUTATION), LEFT: Primary | ICD-10-CM

## 2023-03-17 PROCEDURE — 99213 OFFICE O/P EST LOW 20 MIN: CPT | Performed by: ORTHOPAEDIC SURGERY

## 2023-03-17 PROCEDURE — 99024 POSTOP FOLLOW-UP VISIT: CPT | Performed by: PODIATRIST

## 2023-03-17 PROCEDURE — 1160F RVW MEDS BY RX/DR IN RCRD: CPT | Performed by: ORTHOPAEDIC SURGERY

## 2023-03-17 PROCEDURE — 1159F MED LIST DOCD IN RCRD: CPT | Performed by: ORTHOPAEDIC SURGERY

## 2023-03-17 NOTE — PROGRESS NOTES
"Yamileth Slater is a 63 y.o. female returns for     Chief Complaint   Patient presents with   • Left Leg - Follow-up       HISTORY OF PRESENT ILLNESS:  Doing better.  No problems with stump.  Using stump      CONCURRENT MEDICAL HISTORY:    The following portions of the patient's history were reviewed and updated as appropriate: allergies, current medications, past family history, past medical history, past social history, past surgical history and problem list.     ROS  No fevers or chills.  No chest pain or shortness of air.  No GI or  disturbances.    PHYSICAL EXAMINATION:       Ht 157.5 cm (62\")   Wt 101 kg (222 lb)   LMP  (LMP Unknown)   BMI 40.60 kg/m²     Physical Exam  Constitutional:       General: She is not in acute distress.     Appearance: Normal appearance.   Pulmonary:      Effort: Pulmonary effort is normal. No respiratory distress.   Neurological:      Mental Status: She is alert and oriented to person, place, and time.         GAIT:     []  Normal  []  Antalgic    Assistive device: []  None  []  Walker     []  Crutches  []  Cane     [x]  Wheelchair  []  Stretcher    Left Knee Exam     Comments:  Stump well healed  No erythema  Good cap refill              XR Foot 2 View Right    Result Date: 2/16/2023  Narrative: Two view right foot HISTORY: Foot wound. Possible osteomyelitis. Open wound. AP and lateral views obtained. COMPARISON: December 2, 2022 FINDINGS: Again the great toe has been amputated at the level of the metatarsal phalangeal joint. Small lucencies head of the first metatarsal. Cannot exclude early osteomyelitis. Calcaneal spurs. No fracture or dislocation. No other osseous or articular abnormality. Atherosclerotic calcifications ankle and foot.     Impression: CONCLUSION: Again the great toe has been amputated at the level of the metatarsal phalangeal joint. Small lucencies head of the first metatarsal. Cannot exclude early osteomyelitis. Calcaneal spurs. [id number] " Electronically signed by:  Jon Patricia MD  2/16/2023 5:28 PM CST Workstation: [id number]    XR Chest 1 View    Result Date: 3/15/2023  Narrative: PROCEDURE: Portable chest x-ray TECHNIQUE: Single frontal view of the chest COMPARISON: Chest x-ray 12/16/2022 HISTORY: henri J96.01 Acute respiratory failure with hypoxia N18.6 End stage renal disease Z99.2 Dependence on renal dialysis T81.89XA Other complications of procedures, not elsewhere classified, initial encounter FINDINGS: Right-sided hemodialysis catheter appears stable in position. No overt pulmonary vascular congestion, focal pulmonary parenchymal opacity, pleural effusion or pneumothorax. Cardiac silhouette is enlarged. Sternal suture wires appear stable. Surgical clips again noted in the left neck.     Impression: No acute pulmonary disease. Electronically signed by:  Umesh Parra MD  3/15/2023 2:49 PM CDT Workstation: [id number]            ASSESSMENT:    Diagnoses and all orders for this visit:    S/P BKA (below knee amputation), left ([facility])    MIKE and COPD overlap syndrome ([facility])    PAD (peripheral artery disease) ([facility])    Morbid obesity with BMI of 45.0-49.9, adult ([facility])    CKD (chronic kidney disease) requiring chronic dialysis ([facility])    Pulmonary hypertension ([facility])    Coronary artery disease involving native coronary artery of native heart without angina pectoris          PLAN    Continue mobility as tolerated.  No restrictions  Continue use of stump .  Work with prosthetist to progress to use of leg.  F/u 3 months.    Return in about 3 months (around 6/17/2023) for recheck.    Huy White MD

## 2023-03-24 ENCOUNTER — OFFICE VISIT (OUTPATIENT)
Dept: WOUND CARE | Facility: HOSPITAL | Age: 63
End: 2023-03-24
Payer: MEDICARE

## 2023-03-24 ENCOUNTER — HOME HEALTH ADMISSION (OUTPATIENT)
Dept: HOME HEALTH SERVICES | Facility: HOME HEALTHCARE | Age: 63
End: 2023-03-24
Payer: COMMERCIAL

## 2023-03-24 ENCOUNTER — OUTSIDE FACILITY SERVICE (OUTPATIENT)
Dept: PODIATRY | Facility: CLINIC | Age: 63
End: 2023-03-24
Payer: MEDICARE

## 2023-03-25 ENCOUNTER — ANESTHESIA (OUTPATIENT)
Dept: INTERVENTIONAL RADIOLOGY/VASCULAR | Facility: HOSPITAL | Age: 63
End: 2023-03-25
Payer: MEDICARE

## 2023-03-25 ENCOUNTER — HOSPITAL ENCOUNTER (EMERGENCY)
Facility: HOSPITAL | Age: 63
Discharge: HOME | End: 2023-03-25
Attending: STUDENT IN AN ORGANIZED HEALTH CARE EDUCATION/TRAINING PROGRAM | Admitting: THORACIC SURGERY (CARDIOTHORACIC VASCULAR SURGERY)
Payer: MEDICARE

## 2023-03-25 ENCOUNTER — APPOINTMENT (OUTPATIENT)
Dept: GENERAL RADIOLOGY | Facility: HOSPITAL | Age: 63
End: 2023-03-25
Payer: MEDICARE

## 2023-03-25 VITALS
HEART RATE: 88 BPM | RESPIRATION RATE: 20 BRPM | BODY MASS INDEX: 44.53 KG/M2 | WEIGHT: 242 LBS | HEIGHT: 62 IN | SYSTOLIC BLOOD PRESSURE: 166 MMHG | TEMPERATURE: 98 F | OXYGEN SATURATION: 95 % | DIASTOLIC BLOOD PRESSURE: 77 MMHG

## 2023-03-25 DIAGNOSIS — Z98.890 HISTORY OF INSERTION OF TUNNELED CENTRAL VENOUS CATHETER (CVC) WITH PORT: Primary | ICD-10-CM

## 2023-03-25 DIAGNOSIS — T82.898A PROBLEM WITH DIALYSIS ACCESS, INITIAL ENCOUNTER: ICD-10-CM

## 2023-03-25 LAB — GLUCOSE BLDC GLUCOMTR-MCNC: 168 MG/DL (ref 70–130)

## 2023-03-25 PROCEDURE — 71045 X-RAY EXAM CHEST 1 VIEW: CPT

## 2023-03-25 PROCEDURE — 25010000002 HYDROMORPHONE 1 MG/ML SOLUTION: Performed by: THORACIC SURGERY (CARDIOTHORACIC VASCULAR SURGERY)

## 2023-03-25 PROCEDURE — 36558 INSERT TUNNELED CV CATH: CPT | Performed by: THORACIC SURGERY (CARDIOTHORACIC VASCULAR SURGERY)

## 2023-03-25 PROCEDURE — 25010000002 HEPARIN (PORCINE) PER 1000 UNITS: Performed by: THORACIC SURGERY (CARDIOTHORACIC VASCULAR SURGERY)

## 2023-03-25 PROCEDURE — 77001 FLUOROGUIDE FOR VEIN DEVICE: CPT | Performed by: THORACIC SURGERY (CARDIOTHORACIC VASCULAR SURGERY)

## 2023-03-25 PROCEDURE — 99284 EMERGENCY DEPT VISIT MOD MDM: CPT

## 2023-03-25 PROCEDURE — 25010000002 ONDANSETRON PER 1 MG: Performed by: THORACIC SURGERY (CARDIOTHORACIC VASCULAR SURGERY)

## 2023-03-25 PROCEDURE — 96374 THER/PROPH/DIAG INJ IV PUSH: CPT

## 2023-03-25 PROCEDURE — C1750 CATH, HEMODIALYSIS,LONG-TERM: HCPCS | Performed by: THORACIC SURGERY (CARDIOTHORACIC VASCULAR SURGERY)

## 2023-03-25 PROCEDURE — 99214 OFFICE O/P EST MOD 30 MIN: CPT | Performed by: THORACIC SURGERY (CARDIOTHORACIC VASCULAR SURGERY)

## 2023-03-25 PROCEDURE — 82962 GLUCOSE BLOOD TEST: CPT

## 2023-03-25 PROCEDURE — 25010000002 PROPOFOL 200 MG/20ML EMULSION: Performed by: NURSE ANESTHETIST, CERTIFIED REGISTERED

## 2023-03-25 PROCEDURE — 96375 TX/PRO/DX INJ NEW DRUG ADDON: CPT

## 2023-03-25 RX ORDER — FLUMAZENIL 0.1 MG/ML
0.2 INJECTION INTRAVENOUS AS NEEDED
Status: DISCONTINUED | OUTPATIENT
Start: 2023-03-25 | End: 2023-03-25 | Stop reason: HOSPADM

## 2023-03-25 RX ORDER — ONDANSETRON 4 MG/1
4 TABLET, FILM COATED ORAL EVERY 6 HOURS PRN
Status: CANCELLED | OUTPATIENT
Start: 2023-03-25

## 2023-03-25 RX ORDER — FUROSEMIDE 20 MG/1
20 TABLET ORAL DAILY
Status: CANCELLED | OUTPATIENT
Start: 2023-03-25

## 2023-03-25 RX ORDER — ATORVASTATIN CALCIUM 40 MG/1
40 TABLET, FILM COATED ORAL DAILY
Status: CANCELLED | OUTPATIENT
Start: 2023-03-25

## 2023-03-25 RX ORDER — LIDOCAINE HYDROCHLORIDE 20 MG/ML
INJECTION, SOLUTION EPIDURAL; INFILTRATION; INTRACAUDAL; PERINEURAL AS NEEDED
Status: DISCONTINUED | OUTPATIENT
Start: 2023-03-25 | End: 2023-03-25 | Stop reason: HOSPADM

## 2023-03-25 RX ORDER — EPHEDRINE SULFATE 50 MG/ML
5 INJECTION, SOLUTION INTRAVENOUS ONCE AS NEEDED
Status: DISCONTINUED | OUTPATIENT
Start: 2023-03-25 | End: 2023-03-25 | Stop reason: HOSPADM

## 2023-03-25 RX ORDER — LOSARTAN POTASSIUM 25 MG/1
25 TABLET ORAL DAILY
Status: CANCELLED | OUTPATIENT
Start: 2023-03-25

## 2023-03-25 RX ORDER — DIPHENHYDRAMINE HYDROCHLORIDE 50 MG/ML
12.5 INJECTION INTRAMUSCULAR; INTRAVENOUS
Status: DISCONTINUED | OUTPATIENT
Start: 2023-03-25 | End: 2023-03-25 | Stop reason: HOSPADM

## 2023-03-25 RX ORDER — PROPOFOL 10 MG/ML
INJECTION, EMULSION INTRAVENOUS CONTINUOUS PRN
Status: DISCONTINUED | OUTPATIENT
Start: 2023-03-25 | End: 2023-03-27 | Stop reason: SURG

## 2023-03-25 RX ORDER — ACETAMINOPHEN 500 MG
500 TABLET ORAL EVERY 6 HOURS PRN
Status: CANCELLED | OUTPATIENT
Start: 2023-03-25

## 2023-03-25 RX ORDER — HEPARIN SODIUM 1000 [USP'U]/ML
INJECTION, SOLUTION INTRAVENOUS; SUBCUTANEOUS AS NEEDED
Status: DISCONTINUED | OUTPATIENT
Start: 2023-03-25 | End: 2023-03-25 | Stop reason: HOSPADM

## 2023-03-25 RX ORDER — NITROGLYCERIN 0.4 MG/1
0.4 TABLET SUBLINGUAL
Status: CANCELLED | OUTPATIENT
Start: 2023-03-25

## 2023-03-25 RX ORDER — NALOXONE HCL 0.4 MG/ML
0.4 VIAL (ML) INJECTION AS NEEDED
Status: DISCONTINUED | OUTPATIENT
Start: 2023-03-25 | End: 2023-03-25 | Stop reason: HOSPADM

## 2023-03-25 RX ORDER — CARVEDILOL 6.25 MG/1
6.25 TABLET ORAL 2 TIMES DAILY WITH MEALS
Status: CANCELLED | OUTPATIENT
Start: 2023-03-25

## 2023-03-25 RX ORDER — HEPARIN SODIUM 1000 [USP'U]/ML
INJECTION, SOLUTION INTRAVENOUS; SUBCUTANEOUS
Status: DISCONTINUED
Start: 2023-03-25 | End: 2023-03-25 | Stop reason: HOSPADM

## 2023-03-25 RX ORDER — ALBUTEROL SULFATE 90 UG/1
2 AEROSOL, METERED RESPIRATORY (INHALATION) EVERY 4 HOURS PRN
Status: CANCELLED | OUTPATIENT
Start: 2023-03-25

## 2023-03-25 RX ORDER — SODIUM CHLORIDE, SODIUM GLUCONATE, SODIUM ACETATE, POTASSIUM CHLORIDE, AND MAGNESIUM CHLORIDE 526; 502; 368; 37; 30 MG/100ML; MG/100ML; MG/100ML; MG/100ML; MG/100ML
INJECTION, SOLUTION INTRAVENOUS CONTINUOUS PRN
Status: DISCONTINUED | OUTPATIENT
Start: 2023-03-25 | End: 2023-03-27 | Stop reason: SURG

## 2023-03-25 RX ORDER — IPRATROPIUM BROMIDE AND ALBUTEROL SULFATE 2.5; .5 MG/3ML; MG/3ML
3 SOLUTION RESPIRATORY (INHALATION) 4 TIMES DAILY PRN
Status: CANCELLED | OUTPATIENT
Start: 2023-03-25

## 2023-03-25 RX ORDER — KETAMINE HYDROCHLORIDE 50 MG/ML
INJECTION, SOLUTION, CONCENTRATE INTRAMUSCULAR; INTRAVENOUS AS NEEDED
Status: DISCONTINUED | OUTPATIENT
Start: 2023-03-25 | End: 2023-03-27 | Stop reason: SURG

## 2023-03-25 RX ORDER — SEVELAMER CARBONATE 800 MG/1
800 TABLET, FILM COATED ORAL
Status: CANCELLED | OUTPATIENT
Start: 2023-03-25

## 2023-03-25 RX ORDER — SODIUM CHLORIDE 0.9 % (FLUSH) 0.9 %
10 SYRINGE (ML) INJECTION AS NEEDED
Status: DISCONTINUED | OUTPATIENT
Start: 2023-03-25 | End: 2023-03-25 | Stop reason: HOSPADM

## 2023-03-25 RX ORDER — ACETAMINOPHEN 650 MG/1
650 SUPPOSITORY RECTAL ONCE AS NEEDED
Status: DISCONTINUED | OUTPATIENT
Start: 2023-03-25 | End: 2023-03-25 | Stop reason: HOSPADM

## 2023-03-25 RX ORDER — ONDANSETRON 2 MG/ML
4 INJECTION INTRAMUSCULAR; INTRAVENOUS ONCE AS NEEDED
Status: COMPLETED | OUTPATIENT
Start: 2023-03-25 | End: 2023-03-25

## 2023-03-25 RX ORDER — CYCLOBENZAPRINE HCL 5 MG
5 TABLET ORAL 2 TIMES DAILY
Status: CANCELLED | OUTPATIENT
Start: 2023-03-25

## 2023-03-25 RX ORDER — HYDROXYZINE HYDROCHLORIDE 25 MG/1
25 TABLET, FILM COATED ORAL EVERY 8 HOURS PRN
Status: CANCELLED | OUTPATIENT
Start: 2023-03-25

## 2023-03-25 RX ORDER — ALBUTEROL SULFATE 2.5 MG/3ML
2.5 SOLUTION RESPIRATORY (INHALATION) EVERY 4 HOURS PRN
Status: CANCELLED | OUTPATIENT
Start: 2023-03-25

## 2023-03-25 RX ORDER — ACETAMINOPHEN 325 MG/1
650 TABLET ORAL ONCE AS NEEDED
Status: DISCONTINUED | OUTPATIENT
Start: 2023-03-25 | End: 2023-03-25 | Stop reason: HOSPADM

## 2023-03-25 RX ORDER — PROMETHAZINE HYDROCHLORIDE 25 MG/1
25 TABLET ORAL EVERY 6 HOURS PRN
Status: CANCELLED | OUTPATIENT
Start: 2023-03-25

## 2023-03-25 RX ORDER — CLOPIDOGREL BISULFATE 75 MG/1
75 TABLET ORAL DAILY
Status: CANCELLED | OUTPATIENT
Start: 2023-03-25

## 2023-03-25 RX ORDER — PROMETHAZINE HYDROCHLORIDE 25 MG/1
25 TABLET ORAL ONCE AS NEEDED
Status: DISCONTINUED | OUTPATIENT
Start: 2023-03-25 | End: 2023-03-25 | Stop reason: HOSPADM

## 2023-03-25 RX ORDER — PROMETHAZINE HYDROCHLORIDE 25 MG/1
25 SUPPOSITORY RECTAL ONCE AS NEEDED
Status: DISCONTINUED | OUTPATIENT
Start: 2023-03-25 | End: 2023-03-25 | Stop reason: HOSPADM

## 2023-03-25 RX ORDER — PANTOPRAZOLE SODIUM 40 MG/1
40 TABLET, DELAYED RELEASE ORAL NIGHTLY
Status: CANCELLED | OUTPATIENT
Start: 2023-03-25

## 2023-03-25 RX ORDER — MEMANTINE HYDROCHLORIDE 5 MG/1
5 TABLET ORAL 2 TIMES DAILY
Status: CANCELLED | OUTPATIENT
Start: 2023-03-25

## 2023-03-25 RX ORDER — DOCUSATE SODIUM 100 MG/1
100 CAPSULE, LIQUID FILLED ORAL 2 TIMES DAILY
Status: CANCELLED | OUTPATIENT
Start: 2023-03-25

## 2023-03-25 RX ADMIN — KETAMINE HYDROCHLORIDE 30 MG: 50 INJECTION INTRAMUSCULAR; INTRAVENOUS at 13:07

## 2023-03-25 RX ADMIN — HYDROMORPHONE HYDROCHLORIDE 0.5 MG: 1 INJECTION, SOLUTION INTRAMUSCULAR; INTRAVENOUS; SUBCUTANEOUS at 14:50

## 2023-03-25 RX ADMIN — ONDANSETRON 4 MG: 2 INJECTION INTRAMUSCULAR; INTRAVENOUS at 14:55

## 2023-03-25 RX ADMIN — SODIUM CHLORIDE, SODIUM GLUCONATE, SODIUM ACETATE, POTASSIUM CHLORIDE, AND MAGNESIUM CHLORIDE: 526; 502; 368; 37; 30 INJECTION, SOLUTION INTRAVENOUS at 13:06

## 2023-03-25 RX ADMIN — PROPOFOL 80 MCG/KG/MIN: 10 INJECTION, EMULSION INTRAVENOUS at 13:07

## 2023-03-25 NOTE — CONSULTS
CVTS CONSULTATION    Patient Care Team:  Kiersten iWlson APRN as PCP - General (Family Medicine)    Chief complaint:permacath malfunction    Subjective     History of Present Illness: History of Present Illness  Patient presents from dialysis with complaints of dialysis catheter pulling out.  Patient had not completed her dialysis.  Patient also has persistent bleeding from her right AV graft that was placed several weeks ago.  She does have a follow-up appointment with the surgeon on that for Tuesday.  She is a nursing home patient and is need of dialysis today. Dr Grove requested to replace catheter and plan is to  get her to the dialysis center this afternoon from the hospital     Review of Systems   Constitutional: Negative for activity change and appetite change.   HENT: Negative for congestion and ear pain.    Eyes: Negative for pain and discharge.   Respiratory: Negative for chest tightness and shortness of breath.    Cardiovascular: Negative for chest pain and palpitations.   Gastrointestinal: Negative for abdominal distention and abdominal pain.   Endocrine: Negative for cold intolerance and heat intolerance.   Genitourinary: Negative for difficulty urinating and dysuria.   Musculoskeletal: Negative for arthralgias and back pain.   Skin: Positive for wound. Negative for color change and rash.   Allergic/Immunologic: Negative for environmental allergies and food allergies.   Neurological: Negative for dizziness and headaches.   Hematological: Negative for adenopathy. Does not bruise/bleed easily.   Psychiatric/Behavioral: Negative for agitation and confusion.           Past Medical History:   Diagnosis Date   • Acute blood loss anemia 04/16/2017    Likely due to gastric oozing at this time. - Dr. Duarte (GI) was consulted and has now signed off, will follow up outpatient - pill colonoscopy showed AVMs - continue to monitor   • Acute cystitis with hematuria 03/31/2021 1/13: IV Rocephin 1 gm q 24  1/14 : transitioned  to omnicef 300mg. Urine cultures resulted and did not show growth. Omnicef discontinued as patient is asymptomatic   • Altered mental status 01/09/2022    - AMS on presentation - initial ABG pH 7.3, CO2 34 - Procal 0.29 - UA negative for acute cysitits -CTA head wnl  - Empiric Zosyn and Vancomycin -Lactate 2.5 on admission  - blood cultures no growth at 24 hours     • Anxiety    • CAD (coronary artery disease) 04/24/2021    S/P 3 stents 5/1/2021 for [facility] Continue ASA 81mg & Clopidogrel 75mg Continue Atorvastatin 40mg   • Carotid artery stenosis    • CHF (congestive heart failure) ([facility])    • Chronic obstructive lung disease ([facility])    • CKD (chronic kidney disease) stage 4, GFR 15-29 ml/min ([facility])    • CKD (chronic kidney disease), symptom management only, stage 5 ([facility]) 10/05/2020    Results from last 7 days Lab Units 12/15/21 0548 12/14/21 1323 12/14/21 0916 CREATININE mg/dL 3.92* 3.21* 3.32*  Baseline creatinine 2-3 GFR 13-25 GFR 15 Dialysis MWF, sees Dr. Lauren Nephrology consult,, appreciate recommendations Continue Bumex 1mg bid daily Holding Bumex 2mg 4 times a week   • Colonic polyp    • Coronary arteriosclerosis    • Diabetes mellitus ([facility])    • Diabetic neuropathy ([facility])    • Ear pain, right 10/18/2021    - canal trauma due to patient scratching and DMT2 - added cortisporin ear drops   • Elevated troponin 10/12/2021    -most likely from CKD -Trending down -Neg chest pain   • Generalized abdominal pain 07/01/2022    Could be due to initiation of tube feeds vs dyspepsia vs abdominal cramps related to no PO intake due to intubation vs constipation Continue current laxative regiment  If no bowel movement by this afternoon will consider enema   • GERD (gastroesophageal reflux disease)    • GI bleed 05/13/2021    - GI will follow up outpatient - Protonix 40mg daily - Avoid medical DVT prophy and use mechanical at this time instead. - Continue to monitor - pill colonoscopy results showed AVMs   •  History of transfusion    • Hypercholesterolemia    • Hyperosmolar hyperglycemic state (HHS) ([facility]) 06/25/2022    Serum glucose 605 on admission  Anion gap 16 PH 7.37 Bicarb 27.9 Continue fluids  Insulin drip with HHS protocol  Anion gap closed around 10 AM, received one dose of Levamir subq, will stop insulin drip after 2 hrs     • Hypertension    • Hypokalemia 05/27/2022    Will replace as needed. Will be cautious in the setting of ESRD to avoid need for emergency dialysis   Monitor Qtc intervals on EKG     • Hypomagnesemia 06/27/2021    Monitor and replace   • Morbid obesity ([facility])    • Nephrolithiasis    • On mechanically assisted ventilation ([facility]) 06/26/2022    Vent management and sedation orders placed.  - [facility] intensivist group consulted for vent management appreciate recommendations  - plan to extubate today     • Peripheral vascular disease ([facility])    • Pleural effusion on right 06/26/2022    CXR on 6/30/22 read as a small upper left pulmonary edema vs early pneumonia.  Last Echo 1/2022 EF 61-65 % Continue to monitor  Procal slightly improved, CRP improved On Linezolid and meropenum      • PVD (peripheral vascular disease) ([facility])    • SIRS (systemic inflammatory response syndrome) ([facility]) 01/09/2022    Admission  - WBC 17.78   -   - RR 16 - 1/10: VSS/wnl - CXR - Mild pulm edema - Blood cultures no growth at 24 hours  - Procalcitonin 0.29 - UA : glucose 1000, negative Leucocytes/nitrate - Empiric Zosyn and Vancomcyin    • Sleep apnea    • Substance abuse ([facility])    • Vitamin D deficiency      Past Surgical History:   Procedure Laterality Date   • AMPUTATION DIGIT Right 2/27/2023    Procedure: Excision of right first metatarsal head, right second toe amputation;  Surgeon: Jean Oliveira DPM;  Location[facility] OR;  Service: Podiatry;  Laterality: Right;   • BELOW KNEE AMPUTATION Left 12/16/2022    Procedure: AMPUTATION BELOW KNEE, LEFT;  Surgeon: Huy White MD;  Location: [facility];   Service: Orthopedics;  Laterality: Left;   • CARDIAC CATHETERIZATION N/A 07/14/2020   • CARDIAC CATHETERIZATION N/A 04/23/2021    Procedure: Left Heart Cath;  Surgeon: Melba Romo MD;  Location: [facility] CATH INVASIVE LOCATION;  Service: Cardiology;  Laterality: N/A;   • CARDIAC CATHETERIZATION N/A 04/30/2021    Procedure: Percutaneous Coronary Intervention;  Surgeon: Russell Voss MD;  Location: [facility] CATH INVASIVE LOCATION;  Service: Cardiovascular;  Laterality: N/A;   • CARDIAC CATHETERIZATION N/A 04/30/2021    Procedure: Stent NIKKI coronary;  Surgeon: Russell Voss MD;  Location: [facility] CATH INVASIVE LOCATION;  Service: Cardiovascular;  Laterality: N/A;   • CARDIAC CATHETERIZATION Left 11/13/2021    Procedure: Left Heart Cath;  Surgeon: Niall Rios MD;  Location: [facility] CATH INVASIVE LOCATION;  Service: Cardiology;  Laterality: Left;   • CAROTID STENT Left    • COLONOSCOPY     • COLONOSCOPY N/A 05/14/2021    Procedure: COLONOSCOPY;  Surgeon: Mingo Duarte MD;  Location: [facility] ENDOSCOPY;  Service: Gastroenterology;  Laterality: N/A;   • CORONARY ARTERY BYPASS GRAFT N/A 2013    CABG X 3   • CYSTOSCOPY BLADDER STONE LITHOTRIPSY Bilateral    • ENDOSCOPY N/A 04/12/2021    Procedure: ESOPHAGOGASTRODUODENOSCOPY;  Surgeon: Mingo Duarte MD;  Location: [facility] ENDOSCOPY;  Service: Gastroenterology;  Laterality: N/A;   • ENDOSCOPY N/A 05/14/2021    Procedure: ESOPHAGOGASTRODUODENOSCOPY;  Surgeon: Mingo Duarte MD;  Location: [facility] ENDOSCOPY;  Service: Gastroenterology;  Laterality: N/A;   • ENDOSCOPY N/A 01/28/2022    Procedure: ESOPHAGOGASTRODUODENOSCOPY;  Surgeon: Mingo Duarte MD;  Location: [facility] ENDOSCOPY;  Service: Gastroenterology;  Laterality: N/A;   • HYSTERECTOMY     • INTERVENTIONAL RADIOLOGY PROCEDURE N/A 10/21/2021    Procedure: tunneled central venous catheter placement;  Surgeon: Donnie Robles MD;  Location: [facility] ANGIO INVASIVE LOCATION;   "Service: Interventional Radiology;  Laterality: N/A;   • INTERVENTIONAL RADIOLOGY PROCEDURE N/A 01/27/2022    Procedure: tunneled central venous catheter placement;  Surgeon: Donnie Robles MD;  Location: [facility] INVASIVE LOCATION;  Service: Interventional Radiology;  Laterality: N/A;   • LAPAROSCOPIC TUBAL LIGATION     • TUNNELED VENOUS CATHETER PLACEMENT       Family History   Problem Relation Age of Onset   • Heart disease Mother    • Lung cancer Mother    • Heart disease Father    • Heart attack Father    • Diabetes Father    • Heart disease Half-Sister         Dad's side   • Heart disease Brother    • No Known Problems Sister    • No Known Problems Sister    • No Known Problems Sister    • No Known Problems Sister    • No Known Problems Sister    • Pancreatic cancer Half-Sister         Dad's side   • No Known Problems Brother    • No Known Problems Brother    • Heart attack Half-Brother    • Heart attack Half-Brother    • No Known Problems Maternal Grandmother    • No Known Problems Maternal Grandfather    • No Known Problems Paternal Grandmother    • No Known Problems Paternal Grandfather      Social History     Tobacco Use   • Smoking status: Every Day     Packs/day: 0.25     Years: 46.00     Pack years: 11.50     Types: Cigarettes     Start date: 2/1/1999   • Smokeless tobacco: Never   Vaping Use   • Vaping Use: Never used   Substance Use Topics   • Alcohol use: No   • Drug use: Not Currently     Types: LSD, Marijuana, Methamphetamines     (Not in a hospital admission)       Allergies:  Adhesive tape, Nsaids, Latex, Other, and Wound dressing adhesive    Objective      Vital Signs  Temp:  [98 °F (36.7 °C)] 98 °F (36.7 °C)  Heart Rate:  [85] 85  Resp:  [18] 18  BP: (147)/(68) 147/68    Flowsheet Rows    Flowsheet Row First Filed Value   Admission Height 157.5 cm (62\") Documented at 03/25/2023 1126   Admission Weight 110 kg (242 lb) Documented at 03/25/2023 1126        157.5 cm (62\")    Physical " Exam  Vitals and nursing note reviewed.   Constitutional:       Appearance: Normal appearance. She is obese.   HENT:      Head: Normocephalic and atraumatic.   Eyes:      Extraocular Movements: Extraocular movements intact.      Pupils: Pupils are equal, round, and reactive to light.   Cardiovascular:      Rate and Rhythm: Normal rate.   Pulmonary:      Effort: Pulmonary effort is normal.      Breath sounds: Normal breath sounds.   Abdominal:      General: Abdomen is flat. Bowel sounds are normal.   Musculoskeletal:         General: Normal range of motion.      Cervical back: Normal range of motion and neck supple.      Comments: left amputee   Skin:     General: Skin is warm and dry.   Neurological:      General: No focal deficit present.      Mental Status: She is alert and oriented to person, place, and time.   Psychiatric:         Mood and Affect: Mood normal.         Behavior: Behavior normal.         Thought Content: Thought content normal.         Judgment: Judgment normal.         Results Review:         Assessment & Plan       History of insertion of tunneled central venous catheter (CVC) with port    Malfunction permacath  Exogenous Obesity  ESRD on HD  HTN  HLD      Plan:    Replacement of Permacath. Risk and possible complications of the procedure were explained to the patient in detail including but not limited to infection, need for revision, risk of anesthesia, loss of catheter, aspiration, death, etc. No guarantees were given or implied. Patient has accepted and will sign the consent.      Will proceed as soon as the team is here and ready.    Ramakrishna Brewer MD  03/25/23  12:27 CDT

## 2023-03-25 NOTE — ED PROVIDER NOTES
Subjective   History of Present Illness  Patient presents from dialysis with complaints of dialysis catheter pulling out.  Patient had not completed her dialysis.  Patient also has persistent bleeding from her right AV graft that was placed several weeks ago.  She does have a follow-up appointment with the surgeon on that for Tuesday.  Dr. Brewer, cardiothoracic surgeon, at bedside at time of my exam.        Review of Systems   Constitutional: Negative for activity change and appetite change.   HENT: Negative for congestion and ear pain.    Eyes: Negative for pain and discharge.   Respiratory: Negative for chest tightness and shortness of breath.    Cardiovascular: Negative for chest pain and palpitations.   Gastrointestinal: Negative for abdominal distention and abdominal pain.   Endocrine: Negative for cold intolerance and heat intolerance.   Genitourinary: Negative for difficulty urinating and dysuria.   Musculoskeletal: Negative for arthralgias and back pain.   Skin: Positive for wound. Negative for color change and rash.   Allergic/Immunologic: Negative for environmental allergies and food allergies.   Neurological: Negative for dizziness and headaches.   Hematological: Negative for adenopathy. Does not bruise/bleed easily.   Psychiatric/Behavioral: Negative for agitation and confusion.       Past Medical History:   Diagnosis Date   • Acute blood loss anemia 04/16/2017    Likely due to gastric oozing at this time. - Dr. Duarte (GI) was consulted and has now signed off, will follow up outpatient - pill colonoscopy showed AVMs - continue to monitor   • Acute cystitis with hematuria 03/31/2021 1/13: IV Rocephin 1 gm q 24 1/14 : transitioned  to omnicef 300mg. Urine cultures resulted and did not show growth. Omnicef discontinued as patient is asymptomatic   • Altered mental status 01/09/2022    - AMS on presentation - initial ABG pH 7.3, CO2 34 - Procal 0.29 - UA negative for acute cysitits -CTA head wnl  -  Empiric Zosyn and Vancomycin -Lactate 2.5 on admission  - blood cultures no growth at 24 hours     • Anxiety    • CAD (coronary artery disease) 04/24/2021    S/P 3 stents 5/1/2021 for [facility] Continue ASA 81mg & Clopidogrel 75mg Continue Atorvastatin 40mg   • Carotid artery stenosis    • CHF (congestive heart failure) ([facility])    • Chronic obstructive lung disease ([facility])    • CKD (chronic kidney disease) stage 4, GFR 15-29 ml/min ([facility])    • CKD (chronic kidney disease), symptom management only, stage 5 ([facility]) 10/05/2020    Results from last 7 days Lab Units 12/15/21 0548 12/14/21 1323 12/14/21 0916 CREATININE mg/dL 3.92* 3.21* 3.32*  Baseline creatinine 2-3 GFR 13-25 GFR 15 Dialysis MWF, sees Dr. Lauren Nephrology consult,, appreciate recommendations Continue Bumex 1mg bid daily Holding Bumex 2mg 4 times a week   • Colonic polyp    • Coronary arteriosclerosis    • Diabetes mellitus ([facility])    • Diabetic neuropathy ([facility])    • Ear pain, right 10/18/2021    - canal trauma due to patient scratching and DMT2 - added cortisporin ear drops   • Elevated troponin 10/12/2021    -most likely from CKD -Trending down -Neg chest pain   • Generalized abdominal pain 07/01/2022    Could be due to initiation of tube feeds vs dyspepsia vs abdominal cramps related to no PO intake due to intubation vs constipation Continue current laxative regiment  If no bowel movement by this afternoon will consider enema   • GERD (gastroesophageal reflux disease)    • GI bleed 05/13/2021    - GI will follow up outpatient - Protonix 40mg daily - Avoid medical DVT prophy and use mechanical at this time instead. - Continue to monitor - pill colonoscopy results showed AVMs   • History of transfusion    • Hypercholesterolemia    • Hyperosmolar hyperglycemic state (HHS) ([facility]) 06/25/2022    Serum glucose 605 on admission  Anion gap 16 PH 7.37 Bicarb 27.9 Continue fluids  Insulin drip with HHS protocol  Anion gap closed around 10 AM, received one dose of Levamir subq,  will stop insulin drip after 2 hrs     • Hypertension    • Hypokalemia 05/27/2022    Will replace as needed. Will be cautious in the setting of ESRD to avoid need for emergency dialysis   Monitor Qtc intervals on EKG     • Hypomagnesemia 06/27/2021    Monitor and replace   • Morbid obesity ([facility])    • Nephrolithiasis    • On mechanically assisted ventilation ([facility]) 06/26/2022    Vent management and sedation orders placed.  - Critical access hospital intensivist group consulted for vent management appreciate recommendations  - plan to extubate today     • Peripheral vascular disease ([facility])    • Pleural effusion on right 06/26/2022    CXR on 6/30/22 read as a small upper left pulmonary edema vs early pneumonia.  Last Echo 1/2022 EF 61-65 % Continue to monitor  Procal slightly improved, CRP improved On Linezolid and meropenum      • PVD (peripheral vascular disease) ([facility])    • SIRS (systemic inflammatory response syndrome) ([facility]) 01/09/2022    Admission  - WBC 17.78   -   - RR 16 - 1/10: VSS/wnl - CXR - Mild pulm edema - Blood cultures no growth at 24 hours  - Procalcitonin 0.29 - UA : glucose 1000, negative Leucocytes/nitrate - Empiric Zosyn and Vancomcyin    • Sleep apnea    • Substance abuse ([facility])    • Vitamin D deficiency        Allergies   Allergen Reactions   • Adhesive Tape Hives, Other (See Comments) and Rash   • Nsaids Hives   • Latex Other (See Comments) and Hives   • Other Itching     Bandaids, MRSA, SURECLOSE   • Wound Dressing Adhesive Hives and Rash       Past Surgical History:   Procedure Laterality Date   • AMPUTATION DIGIT Right 2/27/2023    Procedure: Excision of right first metatarsal head, right second toe amputation;  Surgeon: Jean Oliveira DPM;  Location: [facility] OR;  Service: Podiatry;  Laterality: Right;   • BELOW KNEE AMPUTATION Left 12/16/2022    Procedure: AMPUTATION BELOW KNEE, LEFT;  Surgeon: Huy White MD;  Location: [facility] OR;  Service: Orthopedics;  Laterality: Left;   • CARDIAC  CATHETERIZATION N/A 07/14/2020   • CARDIAC CATHETERIZATION N/A 04/23/2021    Procedure: Left Heart Cath;  Surgeon: Melba Romo MD;  Location: [facility] CATH INVASIVE LOCATION;  Service: Cardiology;  Laterality: N/A;   • CARDIAC CATHETERIZATION N/A 04/30/2021    Procedure: Percutaneous Coronary Intervention;  Surgeon: Russell Voss MD;  Location: [facility] CATH INVASIVE LOCATION;  Service: Cardiovascular;  Laterality: N/A;   • CARDIAC CATHETERIZATION N/A 04/30/2021    Procedure: Stent NIKKI coronary;  Surgeon: Russell Voss MD;  Location: [facility] CATH INVASIVE LOCATION;  Service: Cardiovascular;  Laterality: N/A;   • CARDIAC CATHETERIZATION Left 11/13/2021    Procedure: Left Heart Cath;  Surgeon: Niall Rios MD;  Location: [facility] CATH INVASIVE LOCATION;  Service: Cardiology;  Laterality: Left;   • CAROTID STENT Left    • COLONOSCOPY     • COLONOSCOPY N/A 05/14/2021    Procedure: COLONOSCOPY;  Surgeon: Mingo Duarte MD;  Location: [facility] ENDOSCOPY;  Service: Gastroenterology;  Laterality: N/A;   • CORONARY ARTERY BYPASS GRAFT N/A 2013    CABG X 3   • CYSTOSCOPY BLADDER STONE LITHOTRIPSY Bilateral    • ENDOSCOPY N/A 04/12/2021    Procedure: ESOPHAGOGASTRODUODENOSCOPY;  Surgeon: Mingo Duarte MD;  Location: [facility] ENDOSCOPY;  Service: Gastroenterology;  Laterality: N/A;   • ENDOSCOPY N/A 05/14/2021    Procedure: ESOPHAGOGASTRODUODENOSCOPY;  Surgeon: Mingo Duarte MD;  Location: [facility] ENDOSCOPY;  Service: Gastroenterology;  Laterality: N/A;   • ENDOSCOPY N/A 01/28/2022    Procedure: ESOPHAGOGASTRODUODENOSCOPY;  Surgeon: Mingo Duarte MD;  Location: [facility] ENDOSCOPY;  Service: Gastroenterology;  Laterality: N/A;   • HYSTERECTOMY     • INTERVENTIONAL RADIOLOGY PROCEDURE N/A 10/21/2021    Procedure: tunneled central venous catheter placement;  Surgeon: Donnie Robles MD;  Location: [facility] ANGIO INVASIVE LOCATION;  Service: Interventional Radiology;  Laterality: N/A;   •  INTERVENTIONAL RADIOLOGY PROCEDURE N/A 01/27/2022    Procedure: tunneled central venous catheter placement;  Surgeon: Donnie Robles MD;  Location: [facility] ANGIO INVASIVE LOCATION;  Service: Interventional Radiology;  Laterality: N/A;   • LAPAROSCOPIC TUBAL LIGATION     • TUNNELED VENOUS CATHETER PLACEMENT         Family History   Problem Relation Age of Onset   • Heart disease Mother    • Lung cancer Mother    • Heart disease Father    • Heart attack Father    • Diabetes Father    • Heart disease Half-Sister         Dad's side   • Heart disease Brother    • No Known Problems Sister    • No Known Problems Sister    • No Known Problems Sister    • No Known Problems Sister    • No Known Problems Sister    • Pancreatic cancer Half-Sister         Dad's side   • No Known Problems Brother    • No Known Problems Brother    • Heart attack Half-Brother    • Heart attack Half-Brother    • No Known Problems Maternal Grandmother    • No Known Problems Maternal Grandfather    • No Known Problems Paternal Grandmother    • No Known Problems Paternal Grandfather        Social History     Socioeconomic History   • Marital status: Legally      Spouse name: wesley dumont   Tobacco Use   • Smoking status: Every Day     Packs/day: 0.25     Years: 46.00     Pack years: 11.50     Types: Cigarettes     Start date: 2/1/1999   • Smokeless tobacco: Never   Vaping Use   • Vaping Use: Never used   Substance and Sexual Activity   • Alcohol use: No   • Drug use: Not Currently     Types: LSD, Marijuana, Methamphetamines   • Sexual activity: Defer           Objective   Physical Exam  Vitals and nursing note reviewed.   Constitutional:       Appearance: She is well-developed. She is obese.   HENT:      Head: Normocephalic and atraumatic.   Eyes:      Pupils: Pupils are equal, round, and reactive to light.   Neck:      Thyroid: No thyromegaly.      Vascular: No JVD.      Trachea: No tracheal deviation.   Cardiovascular:      Rate and  Rhythm: Normal rate.      Pulses:           Radial pulses are 2+ on the right side and 2+ on the left side.        Dorsalis pedis pulses are 2+ on the right side and 2+ on the left side.      Heart sounds: Normal heart sounds, S1 normal and S2 normal.   Pulmonary:      Effort: Pulmonary effort is normal.      Breath sounds: Normal breath sounds.   Abdominal:      General: Bowel sounds are normal.   Musculoskeletal:         General: Normal range of motion.      Comments: Right foot with toe amputations, left BKA.   Skin:     General: Skin is warm and dry.      Capillary Refill: Capillary refill takes 2 to 3 seconds.      Comments: Right chest wall tunneled cath pulled out past the suture line by about 5 cm.  Right AV graft with dressing on it with bloody drainage.   Neurological:      Mental Status: She is alert and oriented to person, place, and time.      GCS: GCS eye subscore is 4. GCS verbal subscore is 5. GCS motor subscore is 6.   Psychiatric:         Speech: Speech normal.         Behavior: Behavior normal.         Thought Content: Thought content normal.         Procedures           ED Course                                           Medical Decision Making  Patient sent over by nephrology who had already consulted cardiothoracic surgery and alerted angio team.  Patient will be going directly over for replacement of tunneled cath.    Risk  Prescription drug management.          Final diagnoses:   Problem with dialysis access, initial encounter (HCC)       ED Disposition  ED Disposition     ED Disposition   Send to Specialty Department    Condition   --    Comment   Angio for tunnel cath             Kiersten Wilson, APRN  [address] [facility] [address]  [phone]    Call in 1 day  for follow up         Medication List      No changes were made to your prescriptions during this visit.       This document has been electronically signed by Paulina Solano MD on March 25, 2023 12:21 CDT    Paulina  MD Russ   Part of this note may be an electronic transcription/translation of spoken language to printed text using the Dragon Dictation System.        Paulina Solano MD  03/25/23 [id number]

## 2023-03-25 NOTE — ANESTHESIA PROCEDURE NOTES
Peripheral IV    Patient location during procedure: OR  Start time: 3/25/2023 1:45 PM  End time: 3/25/2023 1:47 PM  Line placed for Fluids/Medication Admin.  Performed By   CRNA/CAA: Harley Gilman CRNA  Preanesthetic Checklist  Completed: patient identified, IV checked, site marked, risks and benefits discussed, surgical consent, monitors and equipment checked, pre-op evaluation and timeout performed  Peripheral IV Prep   Patient position: supine   Prep: ChloraPrep  Patient monitoring: continuous pulse ox and cardiac monitor  Peripheral IV Procedure   Laterality:left  Location:  Hand  Catheter size: 22 G          Post Assessment   Dressing Type: transparent and tape.    IV Dressing/Site: clean, dry and intact

## 2023-03-25 NOTE — ED NOTES
Patient is at an increased risk for falls. Fall light activated, yellow falls bracelet placed on patient. Call light within reach and patient instructed to call for assistance. Side rails up x2, bed alarm activated, and gait belt readily accessible. Unable to place socks sandi to dressing on right and amputee on left.

## 2023-03-25 NOTE — ANESTHESIA PREPROCEDURE EVALUATION
Anesthesia Evaluation     Patient summary reviewed and Nursing notes reviewed   no history of anesthetic complications:  NPO Solid Status: > 4 hours  NPO Liquid Status: > 2 hours           Airway   Mallampati: II  TM distance: >3 FB  Neck ROM: full  possible difficult intubation  Comment: ate: 09/19/22; Time: 1848; Blade Size: 4; Airway Device: LMA; Difficulty: Easy; Atraumatic? Yes; Removal Date: 09/19/22; Removal Time: 1940  Dental    (+) poor dentation    Pulmonary    (+) pneumonia resolved , pleural effusion, a smoker Current Abstained day of surgery, COPD moderate, sleep apnea on CPAP, decreased breath sounds,   (-) shortness of breath    ROS comment:     FINDINGS:     Unchanged left neck catheter and right neck catheter. Interval  removal of enteric tube.     Cardiac silhouette is stable. Moderate interstitial prominence  and hazy retrocardiac opacification. No pneumothorax.     IMPRESSION:     Moderate interstitial prominence may reflect interstitial edema  or infectious processes.     Hazy retrocardiac opacification may reflect edema, infiltrate, or  atelectasis.           Electronically signed by:  Jm Mckeon MD  12/16/2022 6:54 PM  Cardiovascular     ECG reviewed  PT is on anticoagulation therapy  Rhythm: regular  Rate: normal    (+) hypertension, past MI , CAD, CABG >6 Months, cardiac stents CHF , ALANIS, murmur (Grade II/VI systolic), PVD, hyperlipidemia,  carotid artery disease carotid bilateral  (-) angina, carotid bruits    ROS comment: Normal sinus rhythm  Right superior axis deviation  Nonspecific intraventricular block  Abnormal ECG  When compared with ECG of 16-DEC-2022 00:19,  No significant change was foundEstimated left ventricular EF = 65% Left ventricular ejection fraction appears to be 61 - 65%. Left ventricular systolic function is normal.Final impression: Successful Pronto thrombectomy and PTCA and stenting of the distal left main and ostial circumflex artery was done with deployment of  four 3.25 mm x 15 mm Xience Gloria drug-eluting stent with reduction of stenosis from 95% to less than 0% stenosis with good BEATRIZ-3 flow.    Neuro/Psych  (+) psychiatric history Anxiety,    GI/Hepatic/Renal/Endo    (+) morbid obesity, GERD well controlled,  renal disease CRI, dialysis and stones, diabetes mellitus type 2 using insulin, thyroid problem hypothyroidism    Musculoskeletal     Abdominal   (+) obese,    Substance History - negative use     OB/GYN negative ob/gyn ROS         Other   arthritis,                      Anesthesia Plan    ASA 4     general and MAC   total IV anesthesia  (Deemed emergency by DR. Brewer)  intravenous induction     Anesthetic plan, risks, benefits, and alternatives have been provided, discussed and informed consent has been obtained with: patient and spouse/significant other.  Pre-procedure education provided  Plan discussed with CRNA.        CODE STATUS:

## 2023-03-25 NOTE — ANESTHESIA PROCEDURE NOTES
Peripheral IV    Patient location during procedure: OR  Start time: 3/25/2023 1:12 PM  End time: 3/25/2023 1:15 PM  Line placed for Fluids/Medication Admin, Difficult Access and Sedation.  Performed By   CRNA/CAA: Harley Gilman CRNA  Preanesthetic Checklist  Completed: patient identified, IV checked, site marked, risks and benefits discussed, surgical consent, monitors and equipment checked, pre-op evaluation and timeout performed  Peripheral IV Prep   Patient position: supine   Prep: ChloraPrep  Patient monitoring: continuous pulse ox and cardiac monitor  Peripheral IV Procedure   Laterality:left  Location:  Antecubital  Catheter size: 22 G  Guidance: ultrasound guided          Post Assessment   Dressing Type: transparent and tape.    IV Dressing/Site: clean, dry and intact

## 2023-03-27 NOTE — ANESTHESIA POSTPROCEDURE EVALUATION
Patient: Yamileth Slater    Procedure Summary     Date: 03/25/23 Room / Location: [facility] [place] ANGIO INVASIVE LOCATION    Anesthesia Start: 1305 Anesthesia Stop: 1408    Procedure: Tunnelled Dialysis Catheter Placement (Right) Diagnosis: History of insertion of tunneled central venous catheter (CVC) with port    Providers: Ramakrishna Brewer MD Provider: Harley Gilman CRNA    Anesthesia Type: general, MAC ASA Status: 4          Anesthesia Type: general, MAC    Vitals  Vitals Value Taken Time   /77 03/25/23 1515   Temp 98 °F (36.7 °C) 03/25/23 1515   Pulse 88 03/25/23 1515   Resp 20 03/25/23 1515   SpO2 95 % 03/25/23 1515           Post Anesthesia Care and Evaluation    Patient location during evaluation: PACU  Patient participation: complete - patient participated  Level of consciousness: awake and awake and alert  Pain score: 2  Pain management: adequate    Airway patency: patent  Anesthetic complications: No anesthetic complications  PONV Status: none  Cardiovascular status: acceptable and stable  Respiratory status: acceptable, room air and spontaneous ventilation  Hydration status: acceptable    Comments: BP:    HR:    SAT:    RR:    TEMP:

## 2023-03-29 ENCOUNTER — OUTSIDE FACILITY SERVICE (OUTPATIENT)
Dept: WOUND CARE | Facility: HOSPITAL | Age: 63
End: 2023-03-29
Payer: MEDICARE

## 2023-03-29 ENCOUNTER — TRANSCRIBE ORDERS (OUTPATIENT)
Dept: HOME HEALTH SERVICES | Facility: HOME HEALTHCARE | Age: 63
End: 2023-03-29
Payer: MEDICARE

## 2023-03-29 ENCOUNTER — HOME HEALTH ADMISSION (OUTPATIENT)
Dept: HOME HEALTH SERVICES | Facility: HOME HEALTHCARE | Age: 63
End: 2023-03-29
Payer: COMMERCIAL

## 2023-03-29 ENCOUNTER — OFFICE VISIT (OUTPATIENT)
Dept: WOUND CARE | Facility: HOSPITAL | Age: 63
End: 2023-03-29
Payer: MEDICARE

## 2023-03-29 DIAGNOSIS — T81.31XD DEHISCENCE OF INCISION, SUBSEQUENT ENCOUNTER: Primary | ICD-10-CM

## 2023-03-31 ENCOUNTER — HOME CARE VISIT (OUTPATIENT)
Dept: HOME HEALTH SERVICES | Facility: HOME HEALTHCARE | Age: 63
End: 2023-03-31

## 2023-03-31 ENCOUNTER — HOME CARE VISIT (OUTPATIENT)
Dept: HOME HEALTH SERVICES | Facility: CLINIC | Age: 63
End: 2023-03-31
Payer: MEDICARE

## 2023-03-31 DIAGNOSIS — E78.2 MIXED HYPERLIPIDEMIA: ICD-10-CM

## 2023-03-31 RX ORDER — INSULIN DETEMIR 100 [IU]/ML
INJECTION, SOLUTION SUBCUTANEOUS
Qty: 15 ML | Refills: 12 | OUTPATIENT
Start: 2023-03-31

## 2023-03-31 RX ORDER — ATORVASTATIN CALCIUM 20 MG/1
20 TABLET, FILM COATED ORAL
Qty: 30 TABLET | Refills: 0 | OUTPATIENT
Start: 2023-03-31

## 2023-04-05 ENCOUNTER — OUTSIDE FACILITY SERVICE (OUTPATIENT)
Dept: WOUND CARE | Facility: HOSPITAL | Age: 63
End: 2023-04-05
Payer: MEDICARE

## 2023-04-05 ENCOUNTER — OFFICE VISIT (OUTPATIENT)
Dept: WOUND CARE | Facility: HOSPITAL | Age: 63
End: 2023-04-05
Payer: MEDICARE

## 2023-04-07 ENCOUNTER — HOME CARE VISIT (OUTPATIENT)
Dept: HOME HEALTH SERVICES | Facility: HOME HEALTHCARE | Age: 63
End: 2023-04-07

## 2023-04-07 DIAGNOSIS — K21.9 GASTROESOPHAGEAL REFLUX DISEASE, UNSPECIFIED WHETHER ESOPHAGITIS PRESENT: ICD-10-CM

## 2023-04-11 ENCOUNTER — HOME CARE VISIT (OUTPATIENT)
Dept: HOME HEALTH SERVICES | Facility: HOME HEALTHCARE | Age: 63
End: 2023-04-11

## 2023-04-12 ENCOUNTER — HOME CARE VISIT (OUTPATIENT)
Dept: HOME HEALTH SERVICES | Facility: CLINIC | Age: 63
End: 2023-04-12
Payer: COMMERCIAL

## 2023-04-12 PROCEDURE — G0299 HHS/HOSPICE OF RN EA 15 MIN: HCPCS

## 2023-04-14 ENCOUNTER — HOME CARE VISIT (OUTPATIENT)
Dept: HOME HEALTH SERVICES | Facility: CLINIC | Age: 63
End: 2023-04-14
Payer: COMMERCIAL

## 2023-04-14 VITALS
OXYGEN SATURATION: 94 % | TEMPERATURE: 97.9 F | HEART RATE: 72 BPM | RESPIRATION RATE: 16 BRPM | DIASTOLIC BLOOD PRESSURE: 70 MMHG | SYSTOLIC BLOOD PRESSURE: 120 MMHG

## 2023-04-14 VITALS
RESPIRATION RATE: 18 BRPM | OXYGEN SATURATION: 97 % | WEIGHT: 245 LBS | DIASTOLIC BLOOD PRESSURE: 80 MMHG | HEIGHT: 62 IN | SYSTOLIC BLOOD PRESSURE: 132 MMHG | TEMPERATURE: 98.3 F | HEART RATE: 67 BPM | BODY MASS INDEX: 45.08 KG/M2

## 2023-04-14 PROCEDURE — G0299 HHS/HOSPICE OF RN EA 15 MIN: HCPCS

## 2023-04-15 NOTE — HOME HEALTH
HH VISIT TODAY TO ASSESS PT AND WOUND VAC DRESSING CHANGE. PT SITTING IN HER RECLINER UPON ARRIVAL WITH SISTER PRESENT. PT STATED SHE DID NOT MAKE HER [facility] APPOINTMENT TODAY DUE TO SISTER NOT BEING ABLE TO TAKE HER. PT STATED SHE RESCHEDULED HER APPOINTMENT FOR NEXT WEDNESDAY, 4/19/23. SN REMOVED WD VAC DRESSING AND CLEANED WOUND WITH NS & GAUZE, MEASUREMENTS AND PICTURE TAKEN WHITE SPONGE PACKED IN WOUND, COVERED WITH BLACK SPONGE, COVERED WITH DRAPE, CONNECTED WITH TRAC PAD TO 125MMHG SUCTION, CONTINUOUS. WRAPPED WITH KERLIX, SECURED WITH TAPE. PT TOLERATED WELL.    NEXT SNV: WOUND VAC DRESSING CHANGE

## 2023-04-17 ENCOUNTER — HOME CARE VISIT (OUTPATIENT)
Dept: HOME HEALTH SERVICES | Facility: CLINIC | Age: 63
End: 2023-04-17
Payer: COMMERCIAL

## 2023-04-17 VITALS
TEMPERATURE: 97.5 F | SYSTOLIC BLOOD PRESSURE: 140 MMHG | HEART RATE: 68 BPM | RESPIRATION RATE: 16 BRPM | DIASTOLIC BLOOD PRESSURE: 92 MMHG | OXYGEN SATURATION: 99 %

## 2023-04-17 PROCEDURE — G0299 HHS/HOSPICE OF RN EA 15 MIN: HCPCS

## 2023-04-17 NOTE — HOME HEALTH
HH VISIT TODAY FOR WD VAC DRESSING CHANGE. PT SITTING IN HER RECLINER. PT HAS OCCASIONAL PAIN TO HER (R) FOOT, WORSE WITH DRESSING CHANGES. VSS. SN REOMVED DRESSING, CLEANED WITH WD CLEANSER & GAUZE. PACKED WD WITH WHITE FOAM, COVERED WITH BLACK FOAM, DRAPE APPLIED, WITH TRAC PAD TO CONTINUOUS SUCTION AT 125MMHG. PT TOLERATED WELL. PT HAS APPOINTMENT ON WEDNESDAY AT 1500 WITH [facility].    NEXT SNV: WD VAC DRESSING CHANGE.

## 2023-04-19 ENCOUNTER — OUTSIDE FACILITY SERVICE (OUTPATIENT)
Dept: WOUND CARE | Facility: HOSPITAL | Age: 63
End: 2023-04-19
Payer: MEDICARE

## 2023-04-19 ENCOUNTER — OFFICE VISIT (OUTPATIENT)
Dept: WOUND CARE | Facility: HOSPITAL | Age: 63
End: 2023-04-19
Payer: MEDICARE

## 2023-04-19 PROCEDURE — 97605 NEG PRS WND THER DME<=50SQCM: CPT

## 2023-04-21 ENCOUNTER — HOME CARE VISIT (OUTPATIENT)
Dept: HOME HEALTH SERVICES | Facility: CLINIC | Age: 63
End: 2023-04-21
Payer: COMMERCIAL

## 2023-04-21 PROCEDURE — G0299 HHS/HOSPICE OF RN EA 15 MIN: HCPCS

## 2023-04-24 ENCOUNTER — HOME CARE VISIT (OUTPATIENT)
Dept: HOME HEALTH SERVICES | Facility: CLINIC | Age: 63
End: 2023-04-24
Payer: COMMERCIAL

## 2023-04-24 VITALS
OXYGEN SATURATION: 95 % | DIASTOLIC BLOOD PRESSURE: 68 MMHG | TEMPERATURE: 97.7 F | RESPIRATION RATE: 16 BRPM | HEART RATE: 62 BPM | SYSTOLIC BLOOD PRESSURE: 110 MMHG

## 2023-04-24 VITALS
DIASTOLIC BLOOD PRESSURE: 78 MMHG | TEMPERATURE: 97.9 F | SYSTOLIC BLOOD PRESSURE: 122 MMHG | OXYGEN SATURATION: 98 % | RESPIRATION RATE: 18 BRPM | HEART RATE: 76 BPM

## 2023-04-24 PROCEDURE — G0299 HHS/HOSPICE OF RN EA 15 MIN: HCPCS

## 2023-04-24 NOTE — HOME HEALTH
HH VISIT TODAY TO ASSESS AND WD VAC DRESSING CHANGE. PT SITTING IN HER RECLINER UPON ARRIVAL. PT COMPLAINS WITH PAIN TO HER (L) HAND, 3/10-SORENESS & BRUISING. SN REMOVED WD VAC DRESSING, CLEANSED WD WITH WOUND CLEANSER, PACKED WOUND WITH WHITE SPONGE, THEN COVERED WITH BLACK SPONGE, TRAC PAD APPLIED TO SUCTION AT 125MMHG CONTINUOUS. PT TOLERATED WELL.     NEXT SNV: WD VAC DRESSING CHANGE.

## 2023-04-26 ENCOUNTER — HOME CARE VISIT (OUTPATIENT)
Dept: HOME HEALTH SERVICES | Facility: CLINIC | Age: 63
End: 2023-04-26
Payer: COMMERCIAL

## 2023-04-26 VITALS
DIASTOLIC BLOOD PRESSURE: 70 MMHG | OXYGEN SATURATION: 97 % | SYSTOLIC BLOOD PRESSURE: 136 MMHG | HEART RATE: 68 BPM | RESPIRATION RATE: 16 BRPM | TEMPERATURE: 97.7 F

## 2023-04-26 PROCEDURE — G0299 HHS/HOSPICE OF RN EA 15 MIN: HCPCS

## 2023-04-26 NOTE — HOME HEALTH
HH VISIT TODAY TO ASSESS PT AND CHANGE WD VAC DRESSING. PT COMPLAINS OF ACHING/THROBBING IN HER HANDS & (R) FOOT, USING VOLTAREN GEL. VSS. SN REMOVED WD VAC DRESSING, CLEANSED WITH WD CLEANSER & GAUZE, PACKED WD WITH WHITE SPONGE, THEN BLACK SPONGE, CONNECTED TRAC PAD TO SUCTION AT 125MMHG CONTINUOUS. PT TOLERATED WELL. PT HAS APPOINTMENT ON FRIDAY, 4/27/23 WITH WD [facility].    NEXT SNV: WD VAC DRESSING CHANGE,.

## 2023-04-28 ENCOUNTER — OFFICE VISIT (OUTPATIENT)
Dept: WOUND CARE | Facility: HOSPITAL | Age: 63
End: 2023-04-28
Payer: MEDICARE

## 2023-04-28 ENCOUNTER — OUTSIDE FACILITY SERVICE (OUTPATIENT)
Dept: PODIATRY | Facility: CLINIC | Age: 63
End: 2023-04-28
Payer: MEDICARE

## 2023-04-28 DIAGNOSIS — K21.9 GASTROESOPHAGEAL REFLUX DISEASE, UNSPECIFIED WHETHER ESOPHAGITIS PRESENT: ICD-10-CM

## 2023-04-28 DIAGNOSIS — E11.40 TYPE 2 DIABETES MELLITUS WITH DIABETIC NEUROPATHY, UNSPECIFIED WHETHER LONG TERM INSULIN USE: ICD-10-CM

## 2023-04-28 PROCEDURE — 97605 NEG PRS WND THER DME<=50SQCM: CPT

## 2023-04-28 RX ORDER — PANTOPRAZOLE SODIUM 40 MG/1
40 TABLET, DELAYED RELEASE ORAL NIGHTLY
Qty: 30 TABLET | Refills: 0 | OUTPATIENT
Start: 2023-04-28

## 2023-05-01 ENCOUNTER — HOME CARE VISIT (OUTPATIENT)
Dept: HOME HEALTH SERVICES | Facility: CLINIC | Age: 63
End: 2023-05-01
Payer: COMMERCIAL

## 2023-05-01 PROCEDURE — G0299 HHS/HOSPICE OF RN EA 15 MIN: HCPCS

## 2023-05-02 VITALS
HEART RATE: 90 BPM | SYSTOLIC BLOOD PRESSURE: 132 MMHG | TEMPERATURE: 98 F | OXYGEN SATURATION: 97 % | RESPIRATION RATE: 18 BRPM | DIASTOLIC BLOOD PRESSURE: 84 MMHG

## 2023-05-03 ENCOUNTER — HOME CARE VISIT (OUTPATIENT)
Dept: HOME HEALTH SERVICES | Facility: CLINIC | Age: 63
End: 2023-05-03
Payer: COMMERCIAL

## 2023-05-03 PROCEDURE — G0299 HHS/HOSPICE OF RN EA 15 MIN: HCPCS

## 2023-05-04 VITALS
RESPIRATION RATE: 20 BRPM | SYSTOLIC BLOOD PRESSURE: 120 MMHG | HEART RATE: 87 BPM | DIASTOLIC BLOOD PRESSURE: 78 MMHG | TEMPERATURE: 97.9 F | OXYGEN SATURATION: 97 %

## 2023-05-05 ENCOUNTER — HOME CARE VISIT (OUTPATIENT)
Dept: HOME HEALTH SERVICES | Facility: CLINIC | Age: 63
End: 2023-05-05
Payer: COMMERCIAL

## 2023-05-05 PROCEDURE — G0300 HHS/HOSPICE OF LPN EA 15 MIN: HCPCS

## 2023-05-08 ENCOUNTER — HOME CARE VISIT (OUTPATIENT)
Dept: HOME HEALTH SERVICES | Facility: CLINIC | Age: 63
End: 2023-05-08
Payer: COMMERCIAL

## 2023-05-08 VITALS
HEART RATE: 70 BPM | DIASTOLIC BLOOD PRESSURE: 60 MMHG | TEMPERATURE: 98.1 F | SYSTOLIC BLOOD PRESSURE: 162 MMHG | RESPIRATION RATE: 18 BRPM | OXYGEN SATURATION: 96 %

## 2023-05-08 PROCEDURE — G0299 HHS/HOSPICE OF RN EA 15 MIN: HCPCS

## 2023-05-10 ENCOUNTER — OFFICE VISIT (OUTPATIENT)
Dept: WOUND CARE | Facility: HOSPITAL | Age: 63
End: 2023-05-10
Payer: MEDICARE

## 2023-05-10 ENCOUNTER — OUTSIDE FACILITY SERVICE (OUTPATIENT)
Dept: WOUND CARE | Facility: HOSPITAL | Age: 63
End: 2023-05-10
Payer: MEDICARE

## 2023-05-10 VITALS
TEMPERATURE: 97.9 F | RESPIRATION RATE: 20 BRPM | DIASTOLIC BLOOD PRESSURE: 76 MMHG | SYSTOLIC BLOOD PRESSURE: 124 MMHG | HEART RATE: 90 BPM | OXYGEN SATURATION: 98 %

## 2023-05-10 PROCEDURE — 97605 NEG PRS WND THER DME<=50SQCM: CPT

## 2023-05-11 ENCOUNTER — HOSPITAL ENCOUNTER (OUTPATIENT)
Facility: HOSPITAL | Age: 63
Setting detail: OBSERVATION
LOS: 4 days | Discharge: HOME | End: 2023-05-19
Attending: STUDENT IN AN ORGANIZED HEALTH CARE EDUCATION/TRAINING PROGRAM | Admitting: HOSPITALIST
Payer: MEDICARE

## 2023-05-11 ENCOUNTER — APPOINTMENT (OUTPATIENT)
Dept: GENERAL RADIOLOGY | Facility: HOSPITAL | Age: 63
End: 2023-05-11
Payer: MEDICARE

## 2023-05-11 ENCOUNTER — APPOINTMENT (OUTPATIENT)
Dept: CT IMAGING | Facility: HOSPITAL | Age: 63
End: 2023-05-11
Payer: MEDICARE

## 2023-05-11 ENCOUNTER — HOME CARE VISIT (OUTPATIENT)
Dept: HOME HEALTH SERVICES | Facility: HOME HEALTHCARE | Age: 63
End: 2023-05-11
Payer: COMMERCIAL

## 2023-05-11 DIAGNOSIS — N18.6 ESRD (END STAGE RENAL DISEASE) ON DIALYSIS: Primary | ICD-10-CM

## 2023-05-11 DIAGNOSIS — N39.0 URINARY TRACT INFECTION IN FEMALE: ICD-10-CM

## 2023-05-11 DIAGNOSIS — Z99.2 ESRD (END STAGE RENAL DISEASE) ON DIALYSIS: Primary | ICD-10-CM

## 2023-05-11 DIAGNOSIS — Z74.09 IMPAIRED FUNCTIONAL MOBILITY, BALANCE, GAIT, AND ENDURANCE: ICD-10-CM

## 2023-05-11 DIAGNOSIS — Z74.09 IMPAIRED MOBILITY AND ADLS: ICD-10-CM

## 2023-05-11 DIAGNOSIS — Z78.9 IMPAIRED MOBILITY AND ADLS: ICD-10-CM

## 2023-05-11 DIAGNOSIS — E87.5 HYPERKALEMIA: ICD-10-CM

## 2023-05-11 LAB
ALBUMIN SERPL-MCNC: 3.5 G/DL (ref 3.5–5.2)
ALBUMIN/GLOB SERPL: 0.8 G/DL
ALP SERPL-CCNC: 282 U/L (ref 39–117)
ALT SERPL W P-5'-P-CCNC: 13 U/L (ref 1–33)
AMPHET+METHAMPHET UR QL: NEGATIVE
AMPHETAMINES UR QL: NEGATIVE
ANION GAP SERPL CALCULATED.3IONS-SCNC: 16 MMOL/L (ref 5–15)
AST SERPL-CCNC: 17 U/L (ref 1–32)
BACTERIA UR QL AUTO: ABNORMAL /HPF
BARBITURATES UR QL SCN: NEGATIVE
BASOPHILS # BLD AUTO: 0.06 10*3/MM3 (ref 0–0.2)
BASOPHILS NFR BLD AUTO: 1 % (ref 0–1.5)
BENZODIAZ UR QL SCN: NEGATIVE
BILIRUB SERPL-MCNC: 0.4 MG/DL (ref 0–1.2)
BILIRUB UR QL STRIP: NEGATIVE
BUN SERPL-MCNC: 85 MG/DL (ref 8–23)
BUN/CREAT SERPL: 12.1 (ref 7–25)
BUPRENORPHINE SERPL-MCNC: NEGATIVE NG/ML
CALCIUM SPEC-SCNC: 9.4 MG/DL (ref 8.6–10.5)
CANNABINOIDS SERPL QL: NEGATIVE
CHLORIDE SERPL-SCNC: 104 MMOL/L (ref 98–107)
CLARITY UR: ABNORMAL
CO2 SERPL-SCNC: 19 MMOL/L (ref 22–29)
COCAINE UR QL: NEGATIVE
COLOR UR: YELLOW
CRE SCREEN PCR: NOT DETECTED
CREAT SERPL-MCNC: 7 MG/DL (ref 0.57–1)
D-LACTATE SERPL-SCNC: 0.8 MMOL/L (ref 0.5–2)
DEPRECATED RDW RBC AUTO: 53.5 FL (ref 37–54)
EGFRCR SERPLBLD CKD-EPI 2021: 6.1 ML/MIN/1.73
EOSINOPHIL # BLD AUTO: 0.3 10*3/MM3 (ref 0–0.4)
EOSINOPHIL NFR BLD AUTO: 4.8 % (ref 0.3–6.2)
ERYTHROCYTE [DISTWIDTH] IN BLOOD BY AUTOMATED COUNT: 15.4 % (ref 12.3–15.4)
FENTANYL UR-MCNC: NEGATIVE NG/ML
GEN 5 2HR TROPONIN T REFLEX: 54 NG/L
GLOBULIN UR ELPH-MCNC: 4.2 GM/DL
GLUCOSE SERPL-MCNC: 60 MG/DL (ref 65–99)
GLUCOSE UR STRIP-MCNC: NEGATIVE MG/DL
HBV SURFACE AG SERPL QL IA: NORMAL
HCT VFR BLD AUTO: 28.7 % (ref 34–46.6)
HGB BLD-MCNC: 8.7 G/DL (ref 12–15.9)
HGB UR QL STRIP.AUTO: ABNORMAL
HOLD SPECIMEN: NORMAL
HYALINE CASTS UR QL AUTO: ABNORMAL /LPF
IMM GRANULOCYTES # BLD AUTO: 0.02 10*3/MM3 (ref 0–0.05)
IMM GRANULOCYTES NFR BLD AUTO: 0.3 % (ref 0–0.5)
IMP STRAIN: NOT DETECTED
KETONES UR QL STRIP: NEGATIVE
KPC STRAIN: NOT DETECTED
LEUKOCYTE ESTERASE UR QL STRIP.AUTO: ABNORMAL
LYMPHOCYTES # BLD AUTO: 2 10*3/MM3 (ref 0.7–3.1)
LYMPHOCYTES NFR BLD AUTO: 32.2 % (ref 19.6–45.3)
MAGNESIUM SERPL-MCNC: 2.1 MG/DL (ref 1.6–2.4)
MCH RBC QN AUTO: 28.7 PG (ref 26.6–33)
MCHC RBC AUTO-ENTMCNC: 30.3 G/DL (ref 31.5–35.7)
MCV RBC AUTO: 94.7 FL (ref 79–97)
METHADONE UR QL SCN: NEGATIVE
MONOCYTES # BLD AUTO: 0.49 10*3/MM3 (ref 0.1–0.9)
MONOCYTES NFR BLD AUTO: 7.9 % (ref 5–12)
NDM STRAIN: NOT DETECTED
NEUTROPHILS NFR BLD AUTO: 3.34 10*3/MM3 (ref 1.7–7)
NEUTROPHILS NFR BLD AUTO: 53.8 % (ref 42.7–76)
NITRITE UR QL STRIP: NEGATIVE
NRBC BLD AUTO-RTO: 0 /100 WBC (ref 0–0.2)
OPIATES UR QL: NEGATIVE
OXA 48 STRAIN: NOT DETECTED
OXYCODONE UR QL SCN: POSITIVE
PCP UR QL SCN: NEGATIVE
PH UR STRIP.AUTO: 6.5 [PH] (ref 5–9)
PLATELET # BLD AUTO: 187 10*3/MM3 (ref 140–450)
PMV BLD AUTO: 8.4 FL (ref 6–12)
POTASSIUM SERPL-SCNC: 6.5 MMOL/L (ref 3.5–5.2)
PROCALCITONIN SERPL-MCNC: 0.27 NG/ML (ref 0–0.25)
PROPOXYPH UR QL: NEGATIVE
PROT SERPL-MCNC: 7.7 G/DL (ref 6–8.5)
PROT UR QL STRIP: ABNORMAL
RBC # BLD AUTO: 3.03 10*6/MM3 (ref 3.77–5.28)
RBC # UR STRIP: ABNORMAL /HPF
REF LAB TEST METHOD: ABNORMAL
SODIUM SERPL-SCNC: 139 MMOL/L (ref 136–145)
SP GR UR STRIP: 1.01 (ref 1–1.03)
SQUAMOUS #/AREA URNS HPF: ABNORMAL /HPF
TRICYCLICS UR QL SCN: NEGATIVE
TROPONIN T DELTA: -2 NG/L
TROPONIN T SERPL HS-MCNC: 56 NG/L
UROBILINOGEN UR QL STRIP: ABNORMAL
VIM STRAIN: NOT DETECTED
WBC # UR STRIP: ABNORMAL /HPF
WBC NRBC COR # BLD: 6.21 10*3/MM3 (ref 3.4–10.8)
WHOLE BLOOD HOLD COAG: NORMAL
WHOLE BLOOD HOLD SPECIMEN: NORMAL

## 2023-05-11 PROCEDURE — 83735 ASSAY OF MAGNESIUM: CPT | Performed by: NURSE PRACTITIONER

## 2023-05-11 PROCEDURE — 84484 ASSAY OF TROPONIN QUANT: CPT | Performed by: NURSE PRACTITIONER

## 2023-05-11 PROCEDURE — 82948 REAGENT STRIP/BLOOD GLUCOSE: CPT

## 2023-05-11 PROCEDURE — G0378 HOSPITAL OBSERVATION PER HR: HCPCS

## 2023-05-11 PROCEDURE — 83605 ASSAY OF LACTIC ACID: CPT | Performed by: NURSE PRACTITIONER

## 2023-05-11 PROCEDURE — 80053 COMPREHEN METABOLIC PANEL: CPT | Performed by: NURSE PRACTITIONER

## 2023-05-11 PROCEDURE — 87086 URINE CULTURE/COLONY COUNT: CPT | Performed by: NURSE PRACTITIONER

## 2023-05-11 PROCEDURE — 25010000002 CEFTRIAXONE PER 250 MG: Performed by: NURSE PRACTITIONER

## 2023-05-11 PROCEDURE — 81001 URINALYSIS AUTO W/SCOPE: CPT | Performed by: NURSE PRACTITIONER

## 2023-05-11 PROCEDURE — 25010000002 HALOPERIDOL LACTATE PER 5 MG: Performed by: INTERNAL MEDICINE

## 2023-05-11 PROCEDURE — 84145 PROCALCITONIN (PCT): CPT | Performed by: NURSE PRACTITIONER

## 2023-05-11 PROCEDURE — G0257 UNSCHED DIALYSIS ESRD PT HOS: HCPCS

## 2023-05-11 PROCEDURE — 96372 THER/PROPH/DIAG INJ SC/IM: CPT

## 2023-05-11 PROCEDURE — 96365 THER/PROPH/DIAG IV INF INIT: CPT

## 2023-05-11 PROCEDURE — 70450 CT HEAD/BRAIN W/O DYE: CPT

## 2023-05-11 PROCEDURE — 87186 SC STD MICRODIL/AGAR DIL: CPT | Performed by: NURSE PRACTITIONER

## 2023-05-11 PROCEDURE — 36415 COLL VENOUS BLD VENIPUNCTURE: CPT

## 2023-05-11 PROCEDURE — 87081 CULTURE SCREEN ONLY: CPT | Performed by: HOSPITALIST

## 2023-05-11 PROCEDURE — 71045 X-RAY EXAM CHEST 1 VIEW: CPT

## 2023-05-11 PROCEDURE — 85025 COMPLETE CBC W/AUTO DIFF WBC: CPT | Performed by: NURSE PRACTITIONER

## 2023-05-11 PROCEDURE — 99285 EMERGENCY DEPT VISIT HI MDM: CPT

## 2023-05-11 PROCEDURE — 87340 HEPATITIS B SURFACE AG IA: CPT | Performed by: INTERNAL MEDICINE

## 2023-05-11 PROCEDURE — 80307 DRUG TEST PRSMV CHEM ANLYZR: CPT | Performed by: NURSE PRACTITIONER

## 2023-05-11 PROCEDURE — 87088 URINE BACTERIA CULTURE: CPT | Performed by: NURSE PRACTITIONER

## 2023-05-11 PROCEDURE — 93005 ELECTROCARDIOGRAM TRACING: CPT | Performed by: NURSE PRACTITIONER

## 2023-05-11 PROCEDURE — 93010 ELECTROCARDIOGRAM REPORT: CPT | Performed by: INTERNAL MEDICINE

## 2023-05-11 PROCEDURE — 25010000002 HEPARIN (PORCINE) PER 1000 UNITS: Performed by: HOSPITALIST

## 2023-05-11 PROCEDURE — P9612 CATHETERIZE FOR URINE SPEC: HCPCS

## 2023-05-11 PROCEDURE — 25010000002 HEPARIN (PORCINE) PER 1000 UNITS: Performed by: INTERNAL MEDICINE

## 2023-05-11 RX ORDER — HEPARIN SODIUM 1000 [USP'U]/ML
2000 INJECTION, SOLUTION INTRAVENOUS; SUBCUTANEOUS AS NEEDED
Status: DISCONTINUED | OUTPATIENT
Start: 2023-05-11 | End: 2023-05-16

## 2023-05-11 RX ORDER — ATORVASTATIN CALCIUM 40 MG/1
40 TABLET, FILM COATED ORAL DAILY
Status: DISCONTINUED | OUTPATIENT
Start: 2023-05-11 | End: 2023-05-19 | Stop reason: HOSPADM

## 2023-05-11 RX ORDER — GLUCAGON 1 MG/ML
1 KIT INJECTION
Status: DISCONTINUED | OUTPATIENT
Start: 2023-05-11 | End: 2023-05-19 | Stop reason: HOSPADM

## 2023-05-11 RX ORDER — HYDROXYZINE HYDROCHLORIDE 25 MG/1
25 TABLET, FILM COATED ORAL EVERY 8 HOURS PRN
Status: DISCONTINUED | OUTPATIENT
Start: 2023-05-11 | End: 2023-05-19 | Stop reason: HOSPADM

## 2023-05-11 RX ORDER — DEXTROSE MONOHYDRATE 25 G/50ML
25 INJECTION, SOLUTION INTRAVENOUS
Status: DISCONTINUED | OUTPATIENT
Start: 2023-05-11 | End: 2023-05-19 | Stop reason: HOSPADM

## 2023-05-11 RX ORDER — OXYCODONE HYDROCHLORIDE 5 MG/1
10 TABLET ORAL 3 TIMES DAILY
Status: DISCONTINUED | OUTPATIENT
Start: 2023-05-11 | End: 2023-05-19 | Stop reason: HOSPADM

## 2023-05-11 RX ORDER — LIDOCAINE AND PRILOCAINE 25; 25 MG/G; MG/G
CREAM TOPICAL AS NEEDED
Status: CANCELLED | OUTPATIENT
Start: 2023-05-11

## 2023-05-11 RX ORDER — ALBUTEROL SULFATE 2.5 MG/3ML
2.5 SOLUTION RESPIRATORY (INHALATION) EVERY 4 HOURS PRN
Status: DISCONTINUED | OUTPATIENT
Start: 2023-05-11 | End: 2023-05-19 | Stop reason: HOSPADM

## 2023-05-11 RX ORDER — CLOPIDOGREL BISULFATE 75 MG/1
75 TABLET ORAL DAILY
Status: DISCONTINUED | OUTPATIENT
Start: 2023-05-11 | End: 2023-05-19 | Stop reason: HOSPADM

## 2023-05-11 RX ORDER — ALBUTEROL SULFATE 90 UG/1
2 AEROSOL, METERED RESPIRATORY (INHALATION) EVERY 4 HOURS PRN
Status: DISCONTINUED | OUTPATIENT
Start: 2023-05-11 | End: 2023-05-11 | Stop reason: ALTCHOICE

## 2023-05-11 RX ORDER — NICOTINE POLACRILEX 4 MG
15 LOZENGE BUCCAL
Status: DISCONTINUED | OUTPATIENT
Start: 2023-05-11 | End: 2023-05-19 | Stop reason: HOSPADM

## 2023-05-11 RX ORDER — SODIUM CHLORIDE 0.9 % (FLUSH) 0.9 %
10 SYRINGE (ML) INJECTION AS NEEDED
Status: DISCONTINUED | OUTPATIENT
Start: 2023-05-11 | End: 2023-05-19 | Stop reason: HOSPADM

## 2023-05-11 RX ORDER — ALBUMIN (HUMAN) 12.5 G/50ML
12.5 SOLUTION INTRAVENOUS AS NEEDED
Status: ACTIVE | OUTPATIENT
Start: 2023-05-11 | End: 2023-05-12

## 2023-05-11 RX ORDER — SEVELAMER CARBONATE 800 MG/1
800 TABLET, FILM COATED ORAL
Status: DISCONTINUED | OUTPATIENT
Start: 2023-05-12 | End: 2023-05-19 | Stop reason: HOSPADM

## 2023-05-11 RX ORDER — HALOPERIDOL 5 MG/ML
3 INJECTION INTRAMUSCULAR ONCE
Status: COMPLETED | OUTPATIENT
Start: 2023-05-12 | End: 2023-05-11

## 2023-05-11 RX ORDER — INSULIN ASPART 100 [IU]/ML
14 INJECTION, SOLUTION INTRAVENOUS; SUBCUTANEOUS
Status: DISCONTINUED | OUTPATIENT
Start: 2023-05-12 | End: 2023-05-19 | Stop reason: HOSPADM

## 2023-05-11 RX ORDER — CETIRIZINE HYDROCHLORIDE 5 MG/1
5 TABLET ORAL DAILY
Status: DISCONTINUED | OUTPATIENT
Start: 2023-05-11 | End: 2023-05-19 | Stop reason: HOSPADM

## 2023-05-11 RX ORDER — SODIUM CHLORIDE 0.9 % (FLUSH) 0.9 %
10 SYRINGE (ML) INJECTION EVERY 12 HOURS SCHEDULED
Status: DISCONTINUED | OUTPATIENT
Start: 2023-05-11 | End: 2023-05-19 | Stop reason: HOSPADM

## 2023-05-11 RX ORDER — MEMANTINE HYDROCHLORIDE 5 MG/1
5 TABLET ORAL 2 TIMES DAILY
Status: DISCONTINUED | OUTPATIENT
Start: 2023-05-11 | End: 2023-05-19 | Stop reason: HOSPADM

## 2023-05-11 RX ORDER — ONDANSETRON 4 MG/1
4 TABLET, FILM COATED ORAL EVERY 6 HOURS PRN
Status: DISCONTINUED | OUTPATIENT
Start: 2023-05-11 | End: 2023-05-19 | Stop reason: HOSPADM

## 2023-05-11 RX ORDER — LOSARTAN POTASSIUM 25 MG/1
25 TABLET ORAL DAILY
Status: DISCONTINUED | OUTPATIENT
Start: 2023-05-11 | End: 2023-05-16

## 2023-05-11 RX ORDER — DULOXETIN HYDROCHLORIDE 20 MG/1
20 CAPSULE, DELAYED RELEASE ORAL DAILY
Status: DISCONTINUED | OUTPATIENT
Start: 2023-05-12 | End: 2023-05-19 | Stop reason: HOSPADM

## 2023-05-11 RX ORDER — SODIUM CHLORIDE 9 MG/ML
40 INJECTION, SOLUTION INTRAVENOUS AS NEEDED
Status: DISCONTINUED | OUTPATIENT
Start: 2023-05-11 | End: 2023-05-19 | Stop reason: HOSPADM

## 2023-05-11 RX ORDER — HEPARIN SODIUM 5000 [USP'U]/ML
5000 INJECTION, SOLUTION INTRAVENOUS; SUBCUTANEOUS EVERY 12 HOURS SCHEDULED
Status: DISCONTINUED | OUTPATIENT
Start: 2023-05-11 | End: 2023-05-19 | Stop reason: HOSPADM

## 2023-05-11 RX ORDER — CYCLOBENZAPRINE HCL 5 MG
5 TABLET ORAL 2 TIMES DAILY
Status: DISCONTINUED | OUTPATIENT
Start: 2023-05-11 | End: 2023-05-19 | Stop reason: HOSPADM

## 2023-05-11 RX ORDER — IPRATROPIUM BROMIDE AND ALBUTEROL SULFATE 2.5; .5 MG/3ML; MG/3ML
3 SOLUTION RESPIRATORY (INHALATION) 4 TIMES DAILY PRN
Status: DISCONTINUED | OUTPATIENT
Start: 2023-05-11 | End: 2023-05-19 | Stop reason: HOSPADM

## 2023-05-11 RX ORDER — PANTOPRAZOLE SODIUM 40 MG/1
40 TABLET, DELAYED RELEASE ORAL NIGHTLY
Status: DISCONTINUED | OUTPATIENT
Start: 2023-05-11 | End: 2023-05-19 | Stop reason: HOSPADM

## 2023-05-11 RX ORDER — FUROSEMIDE 20 MG/1
20 TABLET ORAL DAILY
Status: DISCONTINUED | OUTPATIENT
Start: 2023-05-11 | End: 2023-05-12

## 2023-05-11 RX ORDER — ACETAMINOPHEN 325 MG/1
650 TABLET ORAL EVERY 8 HOURS PRN
Status: DISCONTINUED | OUTPATIENT
Start: 2023-05-11 | End: 2023-05-19 | Stop reason: HOSPADM

## 2023-05-11 RX ORDER — DOCUSATE SODIUM 100 MG/1
100 CAPSULE, LIQUID FILLED ORAL 2 TIMES DAILY
Status: DISCONTINUED | OUTPATIENT
Start: 2023-05-11 | End: 2023-05-19 | Stop reason: HOSPADM

## 2023-05-11 RX ORDER — INSULIN ASPART 100 [IU]/ML
0-9 INJECTION, SOLUTION INTRAVENOUS; SUBCUTANEOUS
Status: DISCONTINUED | OUTPATIENT
Start: 2023-05-12 | End: 2023-05-19 | Stop reason: HOSPADM

## 2023-05-11 RX ORDER — CARVEDILOL 6.25 MG/1
6.25 TABLET ORAL 2 TIMES DAILY WITH MEALS
Status: DISCONTINUED | OUTPATIENT
Start: 2023-05-11 | End: 2023-05-19 | Stop reason: HOSPADM

## 2023-05-11 RX ORDER — NITROGLYCERIN 0.4 MG/1
0.4 TABLET SUBLINGUAL
Status: DISCONTINUED | OUTPATIENT
Start: 2023-05-11 | End: 2023-05-19 | Stop reason: HOSPADM

## 2023-05-11 RX ORDER — PROMETHAZINE HYDROCHLORIDE 25 MG/1
25 TABLET ORAL EVERY 6 HOURS PRN
Status: DISCONTINUED | OUTPATIENT
Start: 2023-05-11 | End: 2023-05-19 | Stop reason: HOSPADM

## 2023-05-11 RX ADMIN — HALOPERIDOL LACTATE 3 MG: 5 INJECTION, SOLUTION INTRAMUSCULAR at 23:42

## 2023-05-11 RX ADMIN — HEPARIN SODIUM 5000 UNITS: 5000 INJECTION INTRAVENOUS; SUBCUTANEOUS at 22:05

## 2023-05-11 RX ADMIN — HYDROXYZINE HYDROCHLORIDE 25 MG: 25 TABLET, FILM COATED ORAL at 22:05

## 2023-05-11 RX ADMIN — CYCLOBENZAPRINE 5 MG: 5 TABLET, FILM COATED ORAL at 22:05

## 2023-05-11 RX ADMIN — OXYCODONE HYDROCHLORIDE 10 MG: 5 TABLET ORAL at 22:05

## 2023-05-11 RX ADMIN — CEFTRIAXONE SODIUM 1 G: 1 INJECTION, POWDER, FOR SOLUTION INTRAMUSCULAR; INTRAVENOUS at 12:03

## 2023-05-11 RX ADMIN — MEMANTINE 5 MG: 5 TABLET ORAL at 22:05

## 2023-05-11 RX ADMIN — Medication 10 ML: at 22:25

## 2023-05-11 RX ADMIN — DOCUSATE SODIUM 100 MG: 100 CAPSULE, LIQUID FILLED ORAL at 22:05

## 2023-05-11 RX ADMIN — HEPARIN SODIUM 4000 UNITS: 1000 INJECTION INTRAVENOUS; SUBCUTANEOUS at 15:57

## 2023-05-11 RX ADMIN — PANTOPRAZOLE SODIUM 40 MG: 40 TABLET, DELAYED RELEASE ORAL at 22:05

## 2023-05-11 RX ADMIN — HEPARIN SODIUM 2000 UNITS: 1000 INJECTION INTRAVENOUS; SUBCUTANEOUS at 15:15

## 2023-05-11 RX ADMIN — CARVEDILOL 6.25 MG: 6.25 TABLET, FILM COATED ORAL at 22:05

## 2023-05-11 NOTE — ED PROVIDER NOTES
Subjective   History of Present Illness  Patient was sent from dialysis to the ER for AMS and dizziness. Patient's last dialysis was on Saturday 5/6/23. She states she missed dialysis Tuesday due to not feeling well. Patient states she has generalized weakness, not feeling well, dizziness/lightheadedness (no room spinning), and and nausea. Denies vomiting, CP, or SOB. Patient does not provide much more information to HPI due to falling asleep while answering questions.         Review of Systems   Unable to perform ROS: Mental status change       Past Medical History:   Diagnosis Date   • Acute blood loss anemia 04/16/2017    Likely due to gastric oozing at this time. - Dr. Duarte (GI) was consulted and has now signed off, will follow up outpatient - pill colonoscopy showed AVMs - continue to monitor   • Acute cystitis with hematuria 03/31/2021 1/13: IV Rocephin 1 gm q 24 1/14 : transitioned  to omnicef 300mg. Urine cultures resulted and did not show growth. Omnicef discontinued as patient is asymptomatic   • Altered mental status 01/09/2022    - AMS on presentation - initial ABG pH 7.3, CO2 34 - Procal 0.29 - UA negative for acute cysitits -CTA head wnl  - Empiric Zosyn and Vancomycin -Lactate 2.5 on admission  - blood cultures no growth at 24 hours     • Anxiety    • CAD (coronary artery disease) 04/24/2021    S/P 3 stents 5/1/2021 for BHL Continue ASA 81mg & Clopidogrel 75mg Continue Atorvastatin 40mg   • Carotid artery stenosis    • CHF (congestive heart failure)    • Chronic obstructive lung disease    • CKD (chronic kidney disease) stage 4, GFR 15-29 ml/min    • CKD (chronic kidney disease), symptom management only, stage 5 10/05/2020    Results from last 7 days Lab Units 12/15/21 0548 12/14/21 1323 12/14/21 0916 CREATININE mg/dL 3.92* 3.21* 3.32*  Baseline creatinine 2-3 GFR 13-25 GFR 15 Dialysis MWF, sees Dr. Lauren Nephrology consult,, appreciate recommendations Continue Bumex 1mg bid daily Holding Bumex  2mg 4 times a week   • Colonic polyp    • Coronary arteriosclerosis    • Diabetes mellitus    • Diabetic neuropathy    • Ear pain, right 10/18/2021    - canal trauma due to patient scratching and DMT2 - added cortisporin ear drops   • Elevated troponin 10/12/2021    -most likely from CKD -Trending down -Neg chest pain   • Generalized abdominal pain 07/01/2022    Could be due to initiation of tube feeds vs dyspepsia vs abdominal cramps related to no PO intake due to intubation vs constipation Continue current laxative regiment  If no bowel movement by this afternoon will consider enema   • GERD (gastroesophageal reflux disease)    • GI bleed 05/13/2021    - GI will follow up outpatient - Protonix 40mg daily - Avoid medical DVT prophy and use mechanical at this time instead. - Continue to monitor - pill colonoscopy results showed AVMs   • History of transfusion    • Hypercholesterolemia    • Hyperosmolar hyperglycemic state (HHS) 06/25/2022    Serum glucose 605 on admission  Anion gap 16 PH 7.37 Bicarb 27.9 Continue fluids  Insulin drip with HHS protocol  Anion gap closed around 10 AM, received one dose of Levamir subq, will stop insulin drip after 2 hrs     • Hypertension    • Hypokalemia 05/27/2022    Will replace as needed. Will be cautious in the setting of ESRD to avoid need for emergency dialysis   Monitor Qtc intervals on EKG     • Hypomagnesemia 06/27/2021    Monitor and replace   • Morbid obesity    • Nephrolithiasis    • On mechanically assisted ventilation 06/26/2022    Vent management and sedation orders placed.  - [facility] intensivist group consulted for vent management appreciate recommendations  - plan to extubate today     • Peripheral vascular disease    • Pleural effusion on right 06/26/2022    CXR on 6/30/22 read as a small upper left pulmonary edema vs early pneumonia.  Last Echo 1/2022 EF 61-65 % Continue to monitor  Procal slightly improved, CRP improved On Linezolid and meropenum      •  PVD (peripheral vascular disease)    • SIRS (systemic inflammatory response syndrome) 01/09/2022    Admission  - WBC 17.78   -   - RR 16 - 1/10: VSS/wnl - CXR - Mild pulm edema - Blood cultures no growth at 24 hours  - Procalcitonin 0.29 - UA : glucose 1000, negative Leucocytes/nitrate - Empiric Zosyn and Vancomcyin    • Sleep apnea    • Substance abuse    • Vitamin D deficiency        Allergies   Allergen Reactions   • Adhesive Tape Hives, Other (See Comments) and Rash   • Nsaids Hives   • Latex Other (See Comments) and Hives   • Other Itching     Bandaids, MRSA, SURECLOSE   • Wound Dressing Adhesive Hives and Rash       Past Surgical History:   Procedure Laterality Date   • AMPUTATION DIGIT Right 2/27/2023    Procedure: Excision of right first metatarsal head, right second toe amputation;  Surgeon: Jean Oliveira DPM;  Location: [facility];  Service: Podiatry;  Laterality: Right;   • BELOW KNEE AMPUTATION Left 12/16/2022    Procedure: AMPUTATION BELOW KNEE, LEFT;  Surgeon: Huy White MD;  Location: [facility];  Service: Orthopedics;  Laterality: Left;   • CARDIAC CATHETERIZATION N/A 07/14/2020   • CARDIAC CATHETERIZATION N/A 04/23/2021    Procedure: Left Heart Cath;  Surgeon: Melba Romo MD;  Location: [facility] INVASIVE LOCATION;  Service: Cardiology;  Laterality: N/A;   • CARDIAC CATHETERIZATION N/A 04/30/2021    Procedure: Percutaneous Coronary Intervention;  Surgeon: Russell Voss MD;  Location: [facility] CATH INVASIVE LOCATION;  Service: Cardiovascular;  Laterality: N/A;   • CARDIAC CATHETERIZATION N/A 04/30/2021    Procedure: Stent NIKKI coronary;  Surgeon: Russell Voss MD;  Location: [facility] CATH INVASIVE LOCATION;  Service: Cardiovascular;  Laterality: N/A;   • CARDIAC CATHETERIZATION Left 11/13/2021    Procedure: Left Heart Cath;  Surgeon: Niall Rios MD;  Location: [facility] CATH INVASIVE LOCATION;  Service: Cardiology;  Laterality: Left;   • CAROTID STENT  Left    • COLONOSCOPY     • COLONOSCOPY N/A 05/14/2021    Procedure: COLONOSCOPY;  Surgeon: Mingo Duarte MD;  Location: [facility] ENDOSCOPY;  Service: Gastroenterology;  Laterality: N/A;   • CORONARY ARTERY BYPASS GRAFT N/A 2013    CABG X 3   • CYSTOSCOPY BLADDER STONE LITHOTRIPSY Bilateral    • ENDOSCOPY N/A 04/12/2021    Procedure: ESOPHAGOGASTRODUODENOSCOPY;  Surgeon: Mingo Duarte MD;  Location: [facility] ENDOSCOPY;  Service: Gastroenterology;  Laterality: N/A;   • ENDOSCOPY N/A 05/14/2021    Procedure: ESOPHAGOGASTRODUODENOSCOPY;  Surgeon: Mingo Duarte MD;  Location: [facility] ENDOSCOPY;  Service: Gastroenterology;  Laterality: N/A;   • ENDOSCOPY N/A 01/28/2022    Procedure: ESOPHAGOGASTRODUODENOSCOPY;  Surgeon: Mingo Duarte MD;  Location: [facility] ENDOSCOPY;  Service: Gastroenterology;  Laterality: N/A;   • HYSTERECTOMY     • INTERVENTIONAL RADIOLOGY PROCEDURE N/A 10/21/2021    Procedure: tunneled central venous catheter placement;  Surgeon: Donnie Robles MD;  Location: [facility] INVASIVE LOCATION;  Service: Interventional Radiology;  Laterality: N/A;   • INTERVENTIONAL RADIOLOGY PROCEDURE N/A 01/27/2022    Procedure: tunneled central venous catheter placement;  Surgeon: Donnie Robles MD;  Location: [facility] INVASIVE LOCATION;  Service: Interventional Radiology;  Laterality: N/A;   • LAPAROSCOPIC TUBAL LIGATION     • TUNNELED VENOUS CATHETER PLACEMENT         Family History   Problem Relation Age of Onset   • Heart disease Mother    • Lung cancer Mother    • Heart disease Father    • Heart attack Father    • Diabetes Father    • Heart disease Half-Sister         Dad's side   • Heart disease Brother    • No Known Problems Sister    • No Known Problems Sister    • No Known Problems Sister    • No Known Problems Sister    • No Known Problems Sister    • Pancreatic cancer Half-Sister         Dad's side   • No Known Problems Brother    • No Known Problems Brother    • Heart  "attack Half-Brother    • Heart attack Half-Brother    • No Known Problems Maternal Grandmother    • No Known Problems Maternal Grandfather    • No Known Problems Paternal Grandmother    • No Known Problems Paternal Grandfather        Social History     Socioeconomic History   • Marital status: Legally      Spouse name: wesley dumont   Tobacco Use   • Smoking status: Every Day     Packs/day: 0.25     Years: 46.00     Pack years: 11.50     Types: Cigarettes     Start date: 2/1/1999   • Smokeless tobacco: Never   Vaping Use   • Vaping Use: Never used   Substance and Sexual Activity   • Alcohol use: No   • Drug use: Not Currently     Types: LSD, Marijuana, Methamphetamines   • Sexual activity: Defer           Objective    /62   Pulse 66   Temp 97.6 °F (36.4 °C) (Oral)   Resp 18   Ht 157.5 cm (62\")   Wt 111 kg (245 lb)   LMP  (LMP Unknown)   SpO2 90%   BMI 44.81 kg/m²     Physical Exam  Vitals and nursing note reviewed.   Constitutional:       Appearance: She is ill-appearing.   HENT:      Head: Normocephalic and atraumatic.   Cardiovascular:      Rate and Rhythm: Normal rate and regular rhythm.      Heart sounds: Normal heart sounds.   Pulmonary:      Effort: Pulmonary effort is normal.      Breath sounds: Normal breath sounds. No wheezing or rhonchi.   Abdominal:      General: Bowel sounds are normal.      Palpations: Abdomen is soft.      Tenderness: There is no abdominal tenderness.   Musculoskeletal:         General: Normal range of motion.      Cervical back: Normal range of motion and neck supple.      Comments: Patient with BKA on left   Neurological:      Mental Status: She is alert.         ECG 12 Lead      Date/Time: 5/11/2023 10:38 AM  Performed by: Irasema Hall APRN  Authorized by: Bladimir Morillo MD   Interpreted by physician  Rhythm: sinus rhythm  Rate: normal  BPM: 65  Clinical impression: abnormal ECG  Comments: Nonspecific intraventricular block        Results for " orders placed or performed during the hospital encounter of 05/11/23   Comprehensive Metabolic Panel    Specimen: Blood   Result Value Ref Range    Glucose 60 (L) 65 - 99 mg/dL    BUN 85 (H) 8 - 23 mg/dL    Creatinine 7.00 (H) 0.57 - 1.00 mg/dL    Sodium 139 136 - 145 mmol/L    Potassium 6.5 (C) 3.5 - 5.2 mmol/L    Chloride 104 98 - 107 mmol/L    CO2 19.0 (L) 22.0 - 29.0 mmol/L    Calcium 9.4 8.6 - 10.5 mg/dL    Total Protein 7.7 6.0 - 8.5 g/dL    Albumin 3.5 3.5 - 5.2 g/dL    ALT (SGPT) 13 1 - 33 U/L    AST (SGOT) 17 1 - 32 U/L    Alkaline Phosphatase 282 (H) 39 - 117 U/L    Total Bilirubin 0.4 0.0 - 1.2 mg/dL    Globulin 4.2 gm/dL    A/G Ratio 0.8 g/dL    BUN/Creatinine Ratio 12.1 7.0 - 25.0    Anion Gap 16.0 (H) 5.0 - 15.0 mmol/L    eGFR 6.1 (L) >60.0 mL/min/1.73   Urinalysis With Culture If Indicated - Urine, Catheter    Specimen: Urine, Catheter   Result Value Ref Range    Color, UA Yellow Yellow, Straw, Dark Yellow, Jennifer    Appearance, UA Turbid (A) Clear    pH, UA 6.5 5.0 - 9.0    Specific [place], UA 1.015 1.003 - 1.030    Glucose, UA Negative Negative    Ketones, UA Negative Negative    Bilirubin, UA Negative Negative    Blood, UA Moderate (2+) (A) Negative    Protein, UA >=300 mg/dL (3+) (A) Negative    Leuk Esterase, UA Large (3+) (A) Negative    Nitrite, UA Negative Negative    Urobilinogen, UA 0.2 E.U./dL 0.2 - 1.0 E.U./dL   Urine Drug Screen - Urine, Clean Catch    Specimen: Urine, Clean Catch   Result Value Ref Range    THC, Screen, Urine Negative Negative    Phencyclidine (PCP), Urine Negative Negative    Cocaine Screen, Urine Negative Negative    Methamphetamine, Ur Negative Negative    Opiate Screen Negative Negative    Amphetamine Screen, Urine Negative Negative    Benzodiazepine Screen, Urine Negative Negative    Tricyclic Antidepressants Screen Negative Negative    Methadone Screen, Urine Negative Negative    Barbiturates Screen, Urine Negative Negative    Oxycodone Screen, Urine Positive (A)  Negative    Propoxyphene Screen Negative Negative    Buprenorphine, Screen, Urine Negative Negative   High Sensitivity Troponin T    Specimen: Blood   Result Value Ref Range    HS Troponin T 56 (C) <10 ng/L   Magnesium    Specimen: Blood   Result Value Ref Range    Magnesium 2.1 1.6 - 2.4 mg/dL   Lactic Acid, Plasma    Specimen: Blood   Result Value Ref Range    Lactate 0.8 0.5 - 2.0 mmol/L   Procalcitonin    Specimen: Blood   Result Value Ref Range    Procalcitonin 0.27 (H) 0.00 - 0.25 ng/mL   CBC Auto Differential    Specimen: Blood   Result Value Ref Range    WBC 6.21 3.40 - 10.80 10*3/mm3    RBC 3.03 (L) 3.77 - 5.28 10*6/mm3    Hemoglobin 8.7 (L) 12.0 - 15.9 g/dL    Hematocrit 28.7 (L) 34.0 - 46.6 %    MCV 94.7 79.0 - 97.0 fL    MCH 28.7 26.6 - 33.0 pg    MCHC 30.3 (L) 31.5 - 35.7 g/dL    RDW 15.4 12.3 - 15.4 %    RDW-SD 53.5 37.0 - 54.0 fl    MPV 8.4 6.0 - 12.0 fL    Platelets 187 140 - 450 10*3/mm3    Neutrophil % 53.8 42.7 - 76.0 %    Lymphocyte % 32.2 19.6 - 45.3 %    Monocyte % 7.9 5.0 - 12.0 %    Eosinophil % 4.8 0.3 - 6.2 %    Basophil % 1.0 0.0 - 1.5 %    Immature Grans % 0.3 0.0 - 0.5 %    Neutrophils, Absolute 3.34 1.70 - 7.00 10*3/mm3    Lymphocytes, Absolute 2.00 0.70 - 3.10 10*3/mm3    Monocytes, Absolute 0.49 0.10 - 0.90 10*3/mm3    Eosinophils, Absolute 0.30 0.00 - 0.40 10*3/mm3    Basophils, Absolute 0.06 0.00 - 0.20 10*3/mm3    Immature Grans, Absolute 0.02 0.00 - 0.05 10*3/mm3    nRBC 0.0 0.0 - 0.2 /100 WBC   Fentanyl, Urine - Urine, Clean Catch    Specimen: Urine, Clean Catch   Result Value Ref Range    Fentanyl, Urine Negative Negative   Urinalysis, Microscopic Only - Urine, Catheter    Specimen: Urine, Catheter   Result Value Ref Range    RBC, UA 0-2 (A) None Seen /HPF    WBC, UA Too Numerous to Count (A) None Seen, 0-2, 3-5 /HPF    Bacteria, UA None Seen None Seen /HPF    Squamous Epithelial Cells, UA 0-2 None Seen, 0-2 /HPF    Hyaline Casts, UA None Seen None Seen /LPF    Methodology  Automated Microscopy    ECG 12 Lead Altered Mental Status   Result Value Ref Range    QT Interval 446 ms    QTC Interval 463 ms   Green Top (Gel)   Result Value Ref Range    Extra Tube Hold for add-ons.    Lavender Top   Result Value Ref Range    Extra Tube hold for add-on    Light Blue Top   Result Value Ref Range    Extra Tube Hold for add-ons.      *Note: Due to a large number of results and/or encounters for the requested time period, some results have not been displayed. A complete set of results can be found in Results Review.       XR Chest 1 View    Result Date: 5/11/2023  Narrative: FINDINGS: Right-sided PermCath is in place. Patient post median sternotomy. The heart is enlarged. There is mild central vascular prominence. There are low lung volumes. No focal pulmonary consolidations or pleural effusions.             ED Course  ED Course as of 05/11/23 1217   Thu May 11, 2023   1147 Spoke with Dr. Ramires. Agrees to admission. Advised to hold on treatment for elevated K at this time as EKG does not show acute findings at this time. May go ahead with 1 GM of Rocephin for UTI. Dr. Lauren paged.  [SH]   7114 Dr. Perez aware patient being admitted and states he will set up dialysis today.  [SH]      ED Course User Index  [SH] Irasema Hall APRN                                           Medical Decision Making  Patient presents to the ER with c/o AMS and missing dialysis. Work up shows patient is fluid overloaded along with K of 6.5 and UTI. Patient was discussed with Dr. Ramires who agrees to admission. Dr. Lauren also aware of patient being admitted.     Amount and/or Complexity of Data Reviewed  Labs: ordered.  Radiology: ordered.  ECG/medicine tests: ordered and independent interpretation performed.      Risk  Prescription drug management.          Final diagnoses:   ESRD (end stage renal disease) on dialysis   Hyperkalemia   Urinary tract infection in female       ED Disposition  ED Disposition      ED Disposition   Decision to Admit    Condition   --    Comment   Level of Care: Telemetry [5]   Diagnosis: ESRD (end stage renal disease) on dialysis [[id number]]   Admitting Physician: LUCIANO LEDEZMA [[id number]   Attending Physician: LUCIANO LEDEZMA [[id number]]               No follow-up provider specified.       Medication List      No changes were made to your prescriptions during this visit.          Irasema aHll, APRN  05/11/23 1217

## 2023-05-11 NOTE — ED NOTES
Pt arrives via EMS from dialysis with c/o confusion, shaking and dizziness. Pt is T/T/Saturday. Pt states last dialysis was 5/6. Pt did not start dialysis today.

## 2023-05-11 NOTE — H&P
[facility] Admission      Date of Admission: 5/11/2023      Primary Care Physician: Kiersten Wilson APRN      Chief Complaint: Altered mental status    HPI: Patient is a 63-year-old female with multiple medical problems including coronary artery disease, CHF, COPD, and end-stage renal disease who presented to hemodialysis today and was noted to have altered mental status.  She was then sent to the emergency department for evaluation.  Of note patient normally dialyzes on Tuesdays, Thursdays, and Saturdays.  She did not dialyze on Tuesday.  Her last hemodialysis session was Saturday prior to admission.  Patient seen on hemodialysis.  She is unable to answer questions.  She is very fidgety.    Concurrent Medical History:  has a past medical history of Acute blood loss anemia (04/16/2017), Acute cystitis with hematuria (03/31/2021), Altered mental status (01/09/2022), Anxiety, CAD (coronary artery disease) (04/24/2021), Carotid artery stenosis, CHF (congestive heart failure), Chronic obstructive lung disease, CKD (chronic kidney disease) stage 4, GFR 15-29 ml/min, CKD (chronic kidney disease), symptom management only, stage 5 (10/05/2020), Colonic polyp, Coronary arteriosclerosis, Diabetes mellitus, Diabetic neuropathy, Ear pain, right (10/18/2021), Elevated troponin (10/12/2021), Generalized abdominal pain (07/01/2022), GERD (gastroesophageal reflux disease), GI bleed (05/13/2021), History of transfusion, Hypercholesterolemia, Hyperosmolar hyperglycemic state (HHS) (06/25/2022), Hypertension, Hypokalemia (05/27/2022), Hypomagnesemia (06/27/2021), Morbid obesity, Nephrolithiasis, On mechanically assisted ventilation (06/26/2022), Peripheral vascular disease, Pleural effusion on right (06/26/2022), PVD (peripheral vascular disease), SIRS (systemic inflammatory response syndrome) (01/09/2022), Sleep apnea, Substance abuse, and Vitamin D  deficiency.    Past Surgical History:  has a past surgical history that includes Colonoscopy; Carotid stent (Left); Cardiac catheterization (N/A, 07/14/2020); cystoscopy bladder stone lithotripsy (Bilateral); Esophagogastroduodenoscopy (N/A, 04/12/2021); Cardiac catheterization (N/A, 04/23/2021); Cardiac catheterization (N/A, 04/30/2021); Cardiac catheterization (N/A, 04/30/2021); Esophagogastroduodenoscopy (N/A, 05/14/2021); Colonoscopy (N/A, 05/14/2021); Coronary artery bypass graft (N/A, 2013); Interventional radiology procedure (N/A, 10/21/2021); Cardiac catheterization (Left, 11/13/2021); Interventional radiology procedure (N/A, 01/27/2022); Esophagogastroduodenoscopy (N/A, 01/28/2022); Hysterectomy; Laparoscopic tubal ligation; Tunneled venous catheter placement; Leg amputation, lower tibia/fibula (Left, 12/16/2022); and Finger amputation (Right, 2/27/2023).    Family History: family history includes Diabetes in her father; Heart attack in her father, half-brother, and half-brother; Heart disease in her brother, father, half-sister, and mother; Lung cancer in her mother; No Known Problems in her brother, brother, maternal grandfather, maternal grandmother, paternal grandfather, paternal grandmother, sister, sister, sister, sister, and sister; Pancreatic cancer in her half-sister.     Social History:  reports that she has been smoking cigarettes. She started smoking about 24 years ago. She has a 11.50 pack-year smoking history. She has never used smokeless tobacco. She reports that she does not currently use drugs after having used the following drugs: LSD, Marijuana, and Methamphetamines. She reports that she does not drink alcohol.    Allergies:   Allergies   Allergen Reactions   • Adhesive Tape Hives, Other (See Comments) and Rash   • Nsaids Hives   • Latex Other (See Comments) and Hives   • Other Itching     Bandaids, MRSA, SURECLOSE   • Wound Dressing Adhesive Hives and Rash       Medications:   Prior to  "Admission medications    Medication Sig Start Date End Date Taking? Authorizing Provider   acetaminophen (Tylenol 8 Hour) 650 MG 8 hr tablet Take 1 tablet by mouth Every 8 (Eight) Hours As Needed for Mild Pain . 2/1/22   Blake Burris MD   albuterol (PROVENTIL) (2.5 MG/3ML) 0.083% nebulizer solution Inhale the contents of 1 vial by nebulization Every 4 (Four) Hours As Needed for Wheezing. 10/28/21   Haily Mcneil MD   albuterol sulfate  (90 Base) MCG/ACT inhaler Inhale 2 puffs Every 4 (Four) Hours As Needed for Wheezing. 3/26/21   Nirmal Calvillo MD   atorvastatin (LIPITOR) 40 MG tablet Take 1 tablet by mouth Daily.    Caio Thompson MD   Blood Glucose Monitoring Suppl ([facility] Blood Glucose Meter) w/Device kit 1 each 3 (Three) Times a Day. 10/9/20   Nirmal Calvillo MD   carvedilol (COREG) 6.25 MG tablet Take 1 tablet by mouth 2 (Two) Times a Day With Meals. 12/23/22   Jef Ramires MD   Cetirizine HCl 10 MG capsule Take 1 capsule by mouth Daily. 11/4/21   Caio Thompson MD   clopidogrel (PLAVIX) 75 MG tablet Take 1 tablet by mouth Daily. 3/26/21   Nirmal Calvillo MD   cyclobenzaprine (FLEXERIL) 5 MG tablet Take 1 tablet by mouth 2 (Two) Times a Day. 10/28/22   Kristie Kern DPM   Diclofenac Sodium (VOLTAREN) 1 % gel gel Apply 4 g topically to the appropriate area as directed 4 (Four) Times a Day As Needed (Ankle pain). 4/26/22   Kit Brar MD   docusate sodium (COLACE) 100 MG capsule Take 1 capsule by mouth 2 (Two) Times a Day. 1/1/21   Caio Thompson MD   DULoxetine (CYMBALTA) 20 MG capsule Take 1 capsule by mouth Daily. Indications: Disease of the Peripheral Nerves 1/1/21   Caio Thompson MD   Easy Touch Insulin Syringe 30G X 5/16\" 0.5 ML misc USE AS DIRECTED WITH RUCHI 12/1/21   Caio Thompson MD   EASY TOUCH PEN NEEDLES 31G X 8 MM misc  8/7/20   Provider, MD Caio   furosemide (LASIX) 20 MG tablet Take 1 tablet by " mouth Daily.    Caio Thompson MD   gabapentin (NEURONTIN) 300 MG capsule Take 1 capsule by mouth Daily for 3 days. 3/16/23 3/19/23  Esau Ferguson MD   hydrOXYzine (ATARAX) 25 MG tablet Take 25 mg by mouth Every 8 (Eight) Hours As Needed for Itching. 1/1/21   Caio Thompson MD   insulin detemir (LEVEMIR) 100 UNIT/ML injection Inject 24 Units under the skin into the appropriate area as directed 2 (Two) Times a Day. pt takes in the am and pm 4/12/23   Caio Thompson MD   Insulin Lispro, 1 Unit Dial, (HUMALOG) 100 UNIT/ML solution pen-injector Inject 14-35 Units under the skin into the appropriate area as directed 3 (Three) Times a Day With Meals. Takes 14 units with meals plus sliding scale  Sliding scale:   150-199 take 4 units  200-249 take 8 units  250-299 take 12 units  300-349 take 16 units  350-400 take 20 units  greater than 400, call physician 12/23/22   Jef Ramires MD   Insulin Pen Needle (NovoFine) 30G X 8 MM misc As directed 4 times daily 3/26/21   Nirmal Calvillo MD   Insulin Pen Needle 31G X 8 MM misc Use to inject insulin 4 (Four) Times a Day as directed. 10/28/21   Haily Mcneil MD   ipratropium-albuterol (DUO-NEB) 0.5-2.5 mg/3 ml nebulizer Take 3 mL by nebulization 4 (Four) Times a Day As Needed. 3/22/17   Caio Thompson MD   losartan (COZAAR) 25 MG tablet Take 1 tablet by mouth Daily. 12/24/22   Jef Ramires MD   memantine (NAMENDA) 5 MG tablet Take 1 tablet by mouth 2 (Two) Times a Day. 10/28/22   Kristie Kern DPM   nitroglycerin (NITROSTAT) 0.4 MG SL tablet Place 0.4 mg under the tongue Every 5 (Five) Minutes As Needed for Chest Pain (x 3 doses). 1/1/15   Caio Thompson MD   O2 (OXYGEN) Inhale 2 L/min Continuous. 1/1/21   Caio Thompson MD   ondansetron (ZOFRAN) 4 MG tablet Take 1 tablet by mouth Every 6 (Six) Hours As Needed for Nausea or Vomiting. 12/23/22   Jef Ramires MD   oxyCODONE (ROXICODONE) 10 MG tablet Take 10  mg by mouth 3 (Three) Times a Day. Indications: Acute Pain, Chronic Pain 4/14/23   Caio Thompson MD   pantoprazole (PROTONIX) 40 MG EC tablet Take 1 tablet by mouth Every Night. 4/26/22   Kit Brar MD   promethazine (PHENERGAN) 25 MG tablet Take 25 mg by mouth Every 6 (Six) Hours As Needed for Nausea or Vomiting. 11/4/22   Caio Thompson MD   sevelamer (RENVELA) 800 MG tablet Take 1 tablet by mouth 3 (Three) Times a Day With Meals. 1/26/22   Caio Thompson MD   Vitamin D, Ergocalciferol, 25875 units capsule Take 50,000 Units by mouth 1 (One) Time Per Week. Take on Wednesday  Indications: Kidney Failure Syndrome 1/1/21   Caio Thompson MD   insulin aspart (novoLOG FLEXPEN) 100 UNIT/ML solution pen-injector sc pen Inject 30 Units under the skin into the appropriate area as directed 3 (Three) Times a Day With Meals. 2/1/22 3/17/22  Blake Burris MD     I have utilized all available immediate resources to obtain, update, or review the patient's current medications (including all prescriptions, over-the-counter products, herbals, cannabis/cannabidiol products, and vitamin/mineral/dietary (nutritional) supplements).      Review of Systems:  Review of Systems   Unable to perform ROS: Mental status change      Otherwise complete ROS is negative except as mentioned above.    Physical Exam:   Temp:  [97.6 °F (36.4 °C)-98.2 °F (36.8 °C)] 98.2 °F (36.8 °C)  Heart Rate:  [66-70] 68  Resp:  [16-20] 18  BP: ()/(49-62) 142/60  Physical Exam  Constitutional:       Appearance: She is well-developed.   HENT:      Head: Normocephalic and atraumatic.      Nose: Nose normal.   Eyes:      General: Lids are normal. No scleral icterus.     Conjunctiva/sclera: Conjunctivae normal.      Pupils: Pupils are equal, round, and reactive to light.   Neck:      Vascular: No JVD.      Trachea: No tracheal tenderness or tracheal deviation.   Cardiovascular:      Rate and Rhythm: Normal rate and  regular rhythm.      Pulses: Normal pulses.      Heart sounds: Normal heart sounds, S1 normal and S2 normal. No murmur heard.    No friction rub. No gallop.   Pulmonary:      Effort: Pulmonary effort is normal. No accessory muscle usage or respiratory distress.      Breath sounds: Normal breath sounds. No decreased breath sounds, wheezing or rales.   Chest:      Chest wall: No tenderness.   Abdominal:      General: Bowel sounds are normal. There is no distension.      Palpations: Abdomen is soft. There is no mass.      Tenderness: There is no abdominal tenderness. There is no guarding or rebound.   Musculoskeletal:         General: No tenderness.      Cervical back: Normal range of motion and neck supple. No spinous process tenderness.      Comments: Left BKA   Feet:      Comments: Status post right great toe amputation.  Wound VAC on right foot  Skin:     General: Skin is warm.      Coloration: Skin is not pale.      Findings: No rash.   Neurological:      Mental Status: She is alert. She is disoriented.      Cranial Nerves: No cranial nerve deficit.      Sensory: No sensory deficit.      Motor: No atrophy, abnormal muscle tone or seizure activity.      Coordination: Coordination normal.      Deep Tendon Reflexes: Reflexes are normal and symmetric. Reflexes normal.   Psychiatric:         Behavior: Behavior normal.         Thought Content: Thought content normal.         Judgment: Judgment normal.           Results Reviewed:  I have personally reviewed current lab, radiology, and data and agree with results.  Lab Results (last 24 hours)     Procedure Component Value Units Date/Time    Hepatitis B Surface Antigen [[id number]]  (Normal) Collected: 05/11/23 1321    Specimen: Blood Updated: 05/11/23 1416     Hepatitis B Surface Ag Non-Reactive    High Sensitivity Troponin T 2Hr [[id number]]  (Abnormal) Collected: 05/11/23 1321    Specimen: Blood Updated: 05/11/23 1401     HS Troponin T 54 ng/L      Troponin T Delta -2  ng/L     Narrative:      High Sensitive Troponin T Reference Range:  <10.0 ng/L- Negative Female for AMI  <15.0 ng/L- Negative Male for AMI  >=10 - Abnormal Female indicating possible myocardial injury.  >=15 - Abnormal Male indicating possible myocardial injury.   Clinicians would have to utilize clinical acumen, EKG, Troponin, and serial changes to determine if it is an Acute Myocardial Infarction or myocardial injury due to an underlying chronic condition.         [facility] Draw [[id number]] Collected: 05/11/23 1038    Specimen: Blood Updated: 05/11/23 1145    Narrative:      The following orders were created for panel order [facility].  Procedure                               Abnormality         Status                     ---------                               -----------         ------                     Green Top (Gel)[[id number]]                                  Final result               Lavender Top[[id number]]                                     Final result               Gold Top - SST[[id number]]                                                              Light Blue Top[[id number]]                                   Final result                 Please view results for these tests on the individual orders.    Green Top (Gel) [[id number]] Collected: 05/11/23 1038    Specimen: Blood Updated: 05/11/23 1145     Extra Tube Hold for add-ons.     Comment: Auto resulted.       Light Blue Top [[id number]] Collected: 05/11/23 1038    Specimen: Blood Updated: 05/11/23 1145     Extra Tube Hold for add-ons.     Comment: Auto resulted       Lavender Top [[id number]] Collected: 05/11/23 1038    Specimen: Blood Updated: 05/11/23 1145     Extra Tube hold for add-on     Comment: Auto resulted       Comprehensive Metabolic Panel [[id number]]  (Abnormal) Collected: 05/11/23 1038    Specimen: Blood Updated: 05/11/23 1134     Glucose 60 mg/dL      BUN 85 mg/dL      Creatinine 7.00 mg/dL      Sodium 139 mmol/L      Potassium 6.5  mmol/L      Chloride 104 mmol/L      CO2 19.0 mmol/L      Calcium 9.4 mg/dL      Total Protein 7.7 g/dL      Albumin 3.5 g/dL      ALT (SGPT) 13 U/L      AST (SGOT) 17 U/L      Alkaline Phosphatase 282 U/L      Total Bilirubin 0.4 mg/dL      Globulin 4.2 gm/dL      A/G Ratio 0.8 g/dL      BUN/Creatinine Ratio 12.1     Anion Gap 16.0 mmol/L      eGFR 6.1 mL/min/1.73      Comment: <15 Indicative of kidney failure       Narrative:      GFR Normal >60  Chronic Kidney Disease <60  Kidney Failure <15      High Sensitivity Troponin T [[id number]]  (Abnormal) Collected: 05/11/23 1038    Specimen: Blood Updated: 05/11/23 1133     HS Troponin T 56 ng/L     Narrative:      High Sensitive Troponin T Reference Range:  <10.0 ng/L- Negative Female for AMI  <15.0 ng/L- Negative Male for AMI  >=10 - Abnormal Female indicating possible myocardial injury.  >=15 - Abnormal Male indicating possible myocardial injury.   Clinicians would have to utilize clinical acumen, EKG, Troponin, and serial changes to determine if it is an Acute Myocardial Infarction or myocardial injury due to an underlying chronic condition.         Magnesium [[id number]]  (Normal) Collected: 05/11/23 1038    Specimen: Blood Updated: 05/11/23 1132     Magnesium 2.1 mg/dL     Procalcitonin [[id number]]  (Abnormal) Collected: 05/11/23 1038    Specimen: Blood Updated: 05/11/23 1117     Procalcitonin 0.27 ng/mL     Narrative:      As a Marker for Sepsis (Non-Neonates):    1. <0.5 ng/mL represents a low risk of severe sepsis and/or septic shock.  2. >2 ng/mL represents a high risk of severe sepsis and/or septic shock.    As a Marker for Lower Respiratory Tract Infections that require antibiotic therapy:    PCT on Admission    Antibiotic Therapy       6-12 Hrs later    >0.5                Strongly Recommended  >0.25 - <0.5        Recommended   0.1 - 0.25          Discouraged              Remeasure/reassess PCT  <0.1                Strongly Discouraged     Remeasure/reassess  "PCT    As 28 day mortality risk marker: \"Change in Procalcitonin Result\" (>80% or <=80%) if Day 0 (or Day 1) and Day 4 values are available. Refer to http://www.[facility]-pct-calculator.com    Change in PCT <=80%  A decrease of PCT levels below or equal to 80% defines a positive change in PCT test result representing a higher risk for 28-day all-cause mortality of patients diagnosed with severe sepsis for septic shock.    Change in PCT >80%  A decrease of PCT levels of more than 80% defines a negative change in PCT result representing a lower risk for 28-day all-cause mortality of patients diagnosed with severe sepsis or septic shock.       Fentanyl, Urine - Urine, Clean Catch [[id number]]  (Normal) Collected: 05/11/23 1050    Specimen: Urine, Clean Catch Updated: 05/11/23 1116     Fentanyl, Urine Negative    Narrative:      Negative Threshold:      Fentanyl 5 ng/mL     The normal value for the drug tested is negative. This report includes final unconfirmed screening results to be used for medical treatment purposes only. Unconfirmed results must not be used for non-medical purposes such as employment or legal testing. Clinical consideration should be applied to any drug of abuse test, particularly when unconfirmed results are used.           Urine Drug Screen - Urine, Clean Catch [[id number]]  (Abnormal) Collected: 05/11/23 1050    Specimen: Urine, Clean Catch Updated: 05/11/23 1112     THC, Screen, Urine Negative     Phencyclidine (PCP), Urine Negative     Cocaine Screen, Urine Negative     Methamphetamine, Ur Negative     Opiate Screen Negative     Amphetamine Screen, Urine Negative     Benzodiazepine Screen, Urine Negative     Tricyclic Antidepressants Screen Negative     Methadone Screen, Urine Negative     Barbiturates Screen, Urine Negative     Oxycodone Screen, Urine Positive     Propoxyphene Screen Negative     Buprenorphine, Screen, Urine Negative    Narrative:      Cutoff For Drugs Screened:    Amphetamines    "            500 ng/ml  Barbiturates               200 ng/ml  Benzodiazepines            150 ng/ml  Cocaine                    150 ng/ml  Methadone                  200 ng/ml  Opiates                    100 ng/ml  Phencyclidine               25 ng/ml  THC                            50 ng/ml  Methamphetamine            500 ng/ml  Tricyclic Antidepressants  300 ng/ml  Oxycodone                  100 ng/ml  Propoxyphene               300 ng/ml  Buprenorphine               10 ng/ml    The normal value for all drugs tested is negative. This report includes unconfirmed screening results, with the cutoff values listed, to be used for medical treatment purposes only.  Unconfirmed results must not be used for non-medical purposes such as employment or legal testing.  Clinical consideration should be applied to any drug of abuse test, particularly when unconfirmed results are used.      Urinalysis, Microscopic Only - Urine, Catheter [[id number]]  (Abnormal) Collected: 05/11/23 1050    Specimen: Urine, Catheter Updated: 05/11/23 1103     RBC, UA 0-2 /HPF      WBC, UA Too Numerous to Count /HPF      Bacteria, UA None Seen /HPF      Squamous Epithelial Cells, UA 0-2 /HPF      Hyaline Casts, UA None Seen /LPF      Methodology Automated Microscopy    Urinalysis With Culture If Indicated - Urine, Catheter [[id number]]  (Abnormal) Collected: 05/11/23 1050    Specimen: Urine, Catheter Updated: 05/11/23 1103     Color, UA Yellow     Appearance, UA Turbid     pH, UA 6.5     Specific Gravity, UA 1.015     Glucose, UA Negative     Ketones, UA Negative     Bilirubin, UA Negative     Blood, UA Moderate (2+)     Protein, UA >=300 mg/dL (3+)     Leuk Esterase, UA Large (3+)     Nitrite, UA Negative     Urobilinogen, UA 0.2 E.U./dL    Narrative:      In absence of clinical symptoms, the presence of pyuria, bacteria, and/or nitrites on the urinalysis result does not correlate with infection.    Urine Culture - Urine, Urine, Catheter [[id number]]  Collected: 05/11/23 1050    Specimen: Urine, Catheter Updated: 05/11/23 1103    Lactic Acid, Plasma [[id number]]  (Normal) Collected: 05/11/23 1038    Specimen: Blood Updated: 05/11/23 1102     Lactate 0.8 mmol/L     CBC & Differential [[id number]]  (Abnormal) Collected: 05/11/23 1038    Specimen: Blood Updated: 05/11/23 1049    Narrative:      The following orders were created for panel order CBC & Differential.  Procedure                               Abnormality         Status                     ---------                               -----------         ------                     CBC Auto Differential[[id number]]        Abnormal            Final result                 Please view results for these tests on the individual orders.    CBC Auto Differential [[id number]]  (Abnormal) Collected: 05/11/23 1038    Specimen: Blood Updated: 05/11/23 1049     WBC 6.21 10*3/mm3      RBC 3.03 10*6/mm3      Hemoglobin 8.7 g/dL      Hematocrit 28.7 %      MCV 94.7 fL      MCH 28.7 pg      MCHC 30.3 g/dL      RDW 15.4 %      RDW-SD 53.5 fl      MPV 8.4 fL      Platelets 187 10*3/mm3      Neutrophil % 53.8 %      Lymphocyte % 32.2 %      Monocyte % 7.9 %      Eosinophil % 4.8 %      Basophil % 1.0 %      Immature Grans % 0.3 %      Neutrophils, Absolute 3.34 10*3/mm3      Lymphocytes, Absolute 2.00 10*3/mm3      Monocytes, Absolute 0.49 10*3/mm3      Eosinophils, Absolute 0.30 10*3/mm3      Basophils, Absolute 0.06 10*3/mm3      Immature Grans, Absolute 0.02 10*3/mm3      nRBC 0.0 /100 WBC         Imaging Results (Last 24 Hours)     Procedure Component Value Units Date/Time    CT Head Without Contrast [[id number]] Collected: 05/11/23 1316     Updated: 05/11/23 1429    Narrative:      INDICATION:  Altered mental status.    COMPARISON:  05/25/2022.    TECHNIQUE:  Axial images were performed from the calvarium through the base of the skull  followed by 2D multiplanar reformats.    FINDINGS:  There is no mass, mass effect or midline  shift.  The ventricles are midline and  symmetric.  There is no abnormal extra-axial fluid collection.  Mild small  vessel ischemic changes.   Pituitary and posterior fossa are unremarkable.  Calvarium is intact.      Impression:      No acute intracranial process.    XR Chest 1 View [[id number]] Collected: 05/11/23 1047     Updated: 05/11/23 1100    Narrative:      FINDINGS:  Right-sided PermCath is in place. Patient post median sternotomy. The heart is  enlarged. There is mild central vascular prominence. There are low lung volumes.  No focal pulmonary consolidations or pleural effusions.            Assessment:    Active Hospital Problems    Diagnosis    • **ESRD (end stage renal disease) on dialysis              Plan:  1.  Altered mental status: Most likely multifactorial.  Likely secondary to inflammation of urinary tract infection and uremia.  Patient will be dialyzed and urinary tract infection was treated.  2.  End-stage renal disease: Nephrology has been consulted for hemodialysis.  3.  Hyperkalemia: Management with hemodialysis.  4.  Coronary artery disease: Continue home medication.  5.  Urinary tract infection, will empirically treat while urine cultures are pending.  6.  Diabetes mellitus: Long-acting insulin and sliding scale insulin.  7.  Morbid obesity  8.  DVT prophylaxis: Heparin.      Medical Decision Making  Number and Complexity of problems: 2 highly complex medical problems    Conditions and Status:        Condition is unchanged.     [facility] Data  External documents reviewed: Outpatient primary care and subspecialty notes  My EKG interpretation: Normal sinus rhythm  My plain film interpretation: No acute cardiopulmonary disease       Discussed with: Patient and nursing     Treatment Plan  As above    Care Planning  Shared decision making: Patient unable to participate in shared decision making  Code status and discussions: Full code    Disposition  Social Determinants of Health that impact treatment  or disposition: Dialysis compliance  I expect the patient to be discharged to home in 1-2 days.      I confirmed that the patient's Advance Care Plan is present, code status is documented, or surrogate decision maker is listed in the patient's medical record.            This document has been electronically signed by Jef Ramires MD on May 11, 2023 17:37 CDT

## 2023-05-11 NOTE — ED NOTES
Nursing report ED to floor  Yamileth Slater  63 y.o.  female    HPI:   Chief Complaint   Patient presents with    Dizziness    Altered Mental Status    Body Shakes       Admitting doctor:   Jef Ramires MD    Consulting provider(s):  Consults       Date and Time Order Name Status Description    5/11/2023 11:53 AM Nephrology - JOHN (on-call MD from group unless specified) Completed              Admitting diagnosis:   The primary encounter diagnosis was ESRD (end stage renal disease) on dialysis. Diagnoses of Hyperkalemia and Urinary tract infection in female were also pertinent to this visit.    Code status:   Current Code Status       Date Active Code Status Order ID Comments User Context       Prior            Allergies:   Adhesive tape, Nsaids, Latex, Other, and Wound dressing adhesive    Intake and Output    Intake/Output Summary (Last 24 hours) at 5/11/2023 1622  Last data filed at 5/11/2023 1340  Gross per 24 hour   Intake 100 ml   Output --   Net 100 ml       Weight:       05/11/23  1051   Weight: 111 kg (245 lb)       Most recent vitals:   Vitals:    05/11/23 1500 05/11/23 1515 05/11/23 1530 05/11/23 1600   BP: 128/56 108/49 97/59 140/58   BP Location: Left arm Left arm Left arm Right arm   Patient Position: Lying Lying Lying Lying   Pulse: 68 67 66 66   Resp: 20 20 16 18   Temp: 98.2 °F (36.8 °C)      TempSrc:       SpO2:       Weight:       Height:         Oxygen Therapy: RA    Active LDAs/IV Access:   Lines, Drains & Airways       Active LDAs       Name Placement date Placement time Site Days    Peripheral IV 05/11/23 1037 Left Antecubital 05/11/23  1037  Antecubital  less than 1    Hemodialysis Cath Double 03/25/23  1340  Internal Jugular  47                    Labs (abnormal labs have a star):   Labs Reviewed   COMPREHENSIVE METABOLIC PANEL - Abnormal; Notable for the following components:       Result Value    Glucose 60 (*)     BUN 85 (*)     Creatinine 7.00 (*)     Potassium 6.5 (*)     CO2  19.0 (*)     Alkaline Phosphatase 282 (*)     Anion Gap 16.0 (*)     eGFR 6.1 (*)     All other components within normal limits    Narrative:     GFR Normal >60  Chronic Kidney Disease <60  Kidney Failure <15     URINALYSIS W/ CULTURE IF INDICATED - Abnormal; Notable for the following components:    Appearance, UA Turbid (*)     Blood, UA Moderate (2+) (*)     Protein, UA >=300 mg/dL (3+) (*)     Leuk Esterase, UA Large (3+) (*)     All other components within normal limits    Narrative:     In absence of clinical symptoms, the presence of pyuria, bacteria, and/or nitrites on the urinalysis result does not correlate with infection.   URINE DRUG SCREEN - Abnormal; Notable for the following components:    Oxycodone Screen, Urine Positive (*)     All other components within normal limits    Narrative:     Cutoff For Drugs Screened:    Amphetamines               500 ng/ml  Barbiturates               200 ng/ml  Benzodiazepines            150 ng/ml  Cocaine                    150 ng/ml  Methadone                  200 ng/ml  Opiates                    100 ng/ml  Phencyclidine               25 ng/ml  THC                            50 ng/ml  Methamphetamine            500 ng/ml  Tricyclic Antidepressants  300 ng/ml  Oxycodone                  100 ng/ml  Propoxyphene               300 ng/ml  Buprenorphine               10 ng/ml    The normal value for all drugs tested is negative. This report includes unconfirmed screening results, with the cutoff values listed, to be used for medical treatment purposes only.  Unconfirmed results must not be used for non-medical purposes such as employment or legal testing.  Clinical consideration should be applied to any drug of abuse test, particularly when unconfirmed results are used.     TROPONIN - Abnormal; Notable for the following components:    HS Troponin T 56 (*)     All other components within normal limits    Narrative:     High Sensitive Troponin T Reference Range:  <10.0 ng/L-  "Negative Female for AMI  <15.0 ng/L- Negative Male for AMI  >=10 - Abnormal Female indicating possible myocardial injury.  >=15 - Abnormal Male indicating possible myocardial injury.   Clinicians would have to utilize clinical acumen, EKG, Troponin, and serial changes to determine if it is an Acute Myocardial Infarction or myocardial injury due to an underlying chronic condition.        PROCALCITONIN - Abnormal; Notable for the following components:    Procalcitonin 0.27 (*)     All other components within normal limits    Narrative:     As a Marker for Sepsis (Non-Neonates):    1. <0.5 ng/mL represents a low risk of severe sepsis and/or septic shock.  2. >2 ng/mL represents a high risk of severe sepsis and/or septic shock.    As a Marker for Lower Respiratory Tract Infections that require antibiotic therapy:    PCT on Admission    Antibiotic Therapy       6-12 Hrs later    >0.5                Strongly Recommended  >0.25 - <0.5        Recommended   0.1 - 0.25          Discouraged              Remeasure/reassess PCT  <0.1                Strongly Discouraged     Remeasure/reassess PCT    As 28 day mortality risk marker: \"Change in Procalcitonin Result\" (>80% or <=80%) if Day 0 (or Day 1) and Day 4 values are available. Refer to [email or web address]    Change in PCT <=80%  A decrease of PCT levels below or equal to 80% defines a positive change in PCT test result representing a higher risk for 28-day all-cause mortality of patients diagnosed with severe sepsis for septic shock.    Change in PCT >80%  A decrease of PCT levels of more than 80% defines a negative change in PCT result representing a lower risk for 28-day all-cause mortality of patients diagnosed with severe sepsis or septic shock.      CBC WITH AUTO DIFFERENTIAL - Abnormal; Notable for the following components:    RBC 3.03 (*)     Hemoglobin 8.7 (*)     Hematocrit 28.7 (*)     MCHC 30.3 (*)     All other components within normal limits "   URINALYSIS, MICROSCOPIC ONLY - Abnormal; Notable for the following components:    RBC, UA 0-2 (*)     WBC, UA Too Numerous to Count (*)     All other components within normal limits   HIGH SENSITIVITIY TROPONIN T 2HR - Abnormal; Notable for the following components:    HS Troponin T 54 (*)     All other components within normal limits    Narrative:     High Sensitive Troponin T Reference Range:  <10.0 ng/L- Negative Female for AMI  <15.0 ng/L- Negative Male for AMI  >=10 - Abnormal Female indicating possible myocardial injury.  >=15 - Abnormal Male indicating possible myocardial injury.   Clinicians would have to utilize clinical acumen, EKG, Troponin, and serial changes to determine if it is an Acute Myocardial Infarction or myocardial injury due to an underlying chronic condition.        MAGNESIUM - Normal   LACTIC ACID, PLASMA - Normal   FENTANYL, URINE - Normal    Narrative:     Negative Threshold:      Fentanyl 5 ng/mL     The normal value for the drug tested is negative. This report includes final unconfirmed screening results to be used for medical treatment purposes only. Unconfirmed results must not be used for non-medical purposes such as employment or legal testing. Clinical consideration should be applied to any drug of abuse test, particularly when unconfirmed results are used.          HEPATITIS B SURFACE ANTIGEN - Normal   URINE CULTURE   RAINBOW DRAW    Narrative:     The following orders were created for panel order [place] Draw.  Procedure                               Abnormality         Status                     ---------                               -----------         ------                     Green Top (Gel)[[id number]]                                  Final result               Lavender Top[[id number]]                                     Final result               Gold Top - SST[[id number]]                                                              Light Blue Top[[id number]]                                    Final result                 Please view results for these tests on the individual orders.   POCT GLUCOSE FINGERSTICK   CBC AND DIFFERENTIAL    Narrative:     The following orders were created for panel order CBC & Differential.  Procedure                               Abnormality         Status                     ---------                               -----------         ------                     CBC Auto Differential[[id number]]        Abnormal            Final result                 Please view results for these tests on the individual orders.   GREEN TOP   LAVENDER TOP   LIGHT BLUE TOP       Meds given in ED:   Medications   sodium chloride 0.9 % flush 10 mL (has no administration in time range)   heparin (porcine) injection 2,000 Units (4,000 Units Intracatheter Given 5/11/23 1557)   albumin human 25 % IV SOLN 12.5 g (has no administration in time range)   cefTRIAXone (ROCEPHIN) 1 g/100 mL 0.9% NS (MBP) (0 g Intravenous Stopped 5/11/23 1340)           NIH Stroke Scale:       Isolation/Infection(s):  No active isolations   CRE     COVID Testing  Collected NA  Resulted NA    Nursing report ED to floor:  Mental status: A&O to person.  Ambulatory status: Wheelchair  Precautions: NA    ED nurse phone extentsiwj- [phone]

## 2023-05-11 NOTE — CONSULTS
[facility]  [address] [place] [address]. [address]  T - [phone]  F - [phone]     Consultation         PATIENT  DEMOGRAPHICS   PATIENT NAME: Yamileth Slater                      PHYSICIAN: BRIDGETT Hadley  : 1960  MRN: [id number]    Subjective   SUBJECTIVE   Referring Provider: BRIDGETT Lama  Reason for Consultation: ESRD on HD  History of present illness:  This is a 63-year-old female with a past medical history significant for ESRD on hemodialysis 2021, anemia of CKD, DM 2, CAD, hypertension who presented to the hospital again today.  She was sent to the hospital yesterday with AMS.  On presentation to ER she is found to be more oriented but has hyperkalemia and UTI.  She is being admitted to the hospital and nephrology is consulted for management of her hemodialysis needs.    Past Medical History:   Diagnosis Date   • Acute blood loss anemia 2017    Likely due to gastric oozing at this time. - Dr. Duarte (GI) was consulted and has now signed off, will follow up outpatient - pill colonoscopy showed AVMs - continue to monitor   • Acute cystitis with hematuria 2021: IV Rocephin 1 gm q 24  : transitioned  to omnicef 300mg. Urine cultures resulted and did not show growth. Omnicef discontinued as patient is asymptomatic   • Altered mental status 2022    - AMS on presentation - initial ABG pH 7.3, CO2 34 - Procal 0.29 - UA negative for acute cysitits -CTA head wnl  - Empiric Zosyn and Vancomycin -Lactate 2.5 on admission  - blood cultures no growth at 24 hours     • Anxiety    • CAD (coronary artery disease) 2021    S/P 3 stents 2021 for BHL Continue ASA 81mg & Clopidogrel 75mg Continue Atorvastatin 40mg   • Carotid artery stenosis    • CHF (congestive heart failure)    • Chronic obstructive lung disease    • CKD (chronic kidney disease) stage 4, GFR 15-29 ml/min    • CKD (chronic kidney disease), symptom management only,  stage 5 10/05/2020    Results from last 7 days Lab Units 12/15/21 0548 12/14/21 1323 12/14/21 0916 CREATININE mg/dL 3.92* 3.21* 3.32*  Baseline creatinine 2-3 GFR 13-25 GFR 15 Dialysis MWF, sees Dr. Lauren Nephrology consult,, appreciate recommendations Continue Bumex 1mg bid daily Holding Bumex 2mg 4 times a week   • Colonic polyp    • Coronary arteriosclerosis    • Diabetes mellitus    • Diabetic neuropathy    • Ear pain, right 10/18/2021    - canal trauma due to patient scratching and DMT2 - added cortisporin ear drops   • Elevated troponin 10/12/2021    -most likely from CKD -Trending down -Neg chest pain   • Generalized abdominal pain 07/01/2022    Could be due to initiation of tube feeds vs dyspepsia vs abdominal cramps related to no PO intake due to intubation vs constipation Continue current laxative regiment  If no bowel movement by this afternoon will consider enema   • GERD (gastroesophageal reflux disease)    • GI bleed 05/13/2021    - GI will follow up outpatient - Protonix 40mg daily - Avoid medical DVT prophy and use mechanical at this time instead. - Continue to monitor - pill colonoscopy results showed AVMs   • History of transfusion    • Hypercholesterolemia    • Hyperosmolar hyperglycemic state (HHS) 06/25/2022    Serum glucose 605 on admission  Anion gap 16 PH 7.37 Bicarb 27.9 Continue fluids  Insulin drip with HHS protocol  Anion gap closed around 10 AM, received one dose of Levamir subq, will stop insulin drip after 2 hrs     • Hypertension    • Hypokalemia 05/27/2022    Will replace as needed. Will be cautious in the setting of ESRD to avoid need for emergency dialysis   Monitor Qtc intervals on EKG     • Hypomagnesemia 06/27/2021    Monitor and replace   • Morbid obesity    • Nephrolithiasis    • On mechanically assisted ventilation 06/26/2022    Vent management and sedation orders placed.  - [facility] intensivist group consulted for vent management appreciate recommendations  - plan to  extubate today     • Peripheral vascular disease    • Pleural effusion on right 06/26/2022    CXR on 6/30/22 read as a small upper left pulmonary edema vs early pneumonia.  Last Echo 1/2022 EF 61-65 % Continue to monitor  Procal slightly improved, CRP improved On Linezolid and meropenum      • PVD (peripheral vascular disease)    • SIRS (systemic inflammatory response syndrome) 01/09/2022    Admission  - WBC 17.78   -   - RR 16 - 1/10: VSS/wnl - CXR - Mild pulm edema - Blood cultures no growth at 24 hours  - Procalcitonin 0.29 - UA : glucose 1000, negative Leucocytes/nitrate - Empiric Zosyn and Vancomcyin    • Sleep apnea    • Substance abuse    • Vitamin D deficiency      Past Surgical History:   Procedure Laterality Date   • AMPUTATION DIGIT Right 2/27/2023    Procedure: Excision of right first metatarsal head, right second toe amputation;  Surgeon: Jean Oliveira DPM;  Location: [facility];  Service: Podiatry;  Laterality: Right;   • BELOW KNEE AMPUTATION Left 12/16/2022    Procedure: AMPUTATION BELOW KNEE, LEFT;  Surgeon: Huy White MD;  Location: [facility] OR;  Service: Orthopedics;  Laterality: Left;   • CARDIAC CATHETERIZATION N/A 07/14/2020   • CARDIAC CATHETERIZATION N/A 04/23/2021    Procedure: Left Heart Cath;  Surgeon: Melba Romo MD;  Location: [facility] CATH INVASIVE LOCATION;  Service: Cardiology;  Laterality: N/A;   • CARDIAC CATHETERIZATION N/A 04/30/2021    Procedure: Percutaneous Coronary Intervention;  Surgeon: Russell Voss MD;  Location: [facility] CATH INVASIVE LOCATION;  Service: Cardiovascular;  Laterality: N/A;   • CARDIAC CATHETERIZATION N/A 04/30/2021    Procedure: Stent NIKKI coronary;  Surgeon: Russell Voss MD;  Location: [facility] CATH INVASIVE LOCATION;  Service: Cardiovascular;  Laterality: N/A;   • CARDIAC CATHETERIZATION Left 11/13/2021    Procedure: Left Heart Cath;  Surgeon: Niall Rios MD;  Location: [facility] CATH INVASIVE LOCATION;  Service:  Cardiology;  Laterality: Left;   • CAROTID STENT Left    • COLONOSCOPY     • COLONOSCOPY N/A 05/14/2021    Procedure: COLONOSCOPY;  Surgeon: Mingo Duarte MD;  Location: [facility] ENDOSCOPY;  Service: Gastroenterology;  Laterality: N/A;   • CORONARY ARTERY BYPASS GRAFT N/A 2013    CABG X 3   • CYSTOSCOPY BLADDER STONE LITHOTRIPSY Bilateral    • ENDOSCOPY N/A 04/12/2021    Procedure: ESOPHAGOGASTRODUODENOSCOPY;  Surgeon: Mingo Duarte MD;  Location: [facility] ENDOSCOPY;  Service: Gastroenterology;  Laterality: N/A;   • ENDOSCOPY N/A 05/14/2021    Procedure: ESOPHAGOGASTRODUODENOSCOPY;  Surgeon: Minog Duarte MD;  Location: [facility] ENDOSCOPY;  Service: Gastroenterology;  Laterality: N/A;   • ENDOSCOPY N/A 01/28/2022    Procedure: ESOPHAGOGASTRODUODENOSCOPY;  Surgeon: Mingo Duarte MD;  Location: [facility] ENDOSCOPY;  Service: Gastroenterology;  Laterality: N/A;   • HYSTERECTOMY     • INTERVENTIONAL RADIOLOGY PROCEDURE N/A 10/21/2021    Procedure: tunneled central venous catheter placement;  Surgeon: Donnie Robles MD;  Location: [facility] ANGIO INVASIVE LOCATION;  Service: Interventional Radiology;  Laterality: N/A;   • INTERVENTIONAL RADIOLOGY PROCEDURE N/A 01/27/2022    Procedure: tunneled central venous catheter placement;  Surgeon: Donnie Robles MD;  Location: [facility] ANGIO INVASIVE LOCATION;  Service: Interventional Radiology;  Laterality: N/A;   • LAPAROSCOPIC TUBAL LIGATION     • TUNNELED VENOUS CATHETER PLACEMENT       Family History   Problem Relation Age of Onset   • Heart disease Mother    • Lung cancer Mother    • Heart disease Father    • Heart attack Father    • Diabetes Father    • Heart disease Half-Sister         Dad's side   • Heart disease Brother    • No Known Problems Sister    • No Known Problems Sister    • No Known Problems Sister    • No Known Problems Sister    • No Known Problems Sister    • Pancreatic cancer Half-Sister         Dad's side   • No Known Problems  "Brother    • No Known Problems Brother    • Heart attack Half-Brother    • Heart attack Half-Brother    • No Known Problems Maternal Grandmother    • No Known Problems Maternal Grandfather    • No Known Problems Paternal Grandmother    • No Known Problems Paternal Grandfather      Social History     Tobacco Use   • Smoking status: Every Day     Packs/day: 0.25     Years: 46.00     Pack years: 11.50     Types: Cigarettes     Start date: 2/1/1999   • Smokeless tobacco: Never   Vaping Use   • Vaping Use: Never used   Substance Use Topics   • Alcohol use: No   • Drug use: Not Currently     Types: LSD, Marijuana, Methamphetamines     Allergies:  Adhesive tape, Nsaids, Latex, Other, and Wound dressing adhesive     REVIEW OF SYSTEMS    Review of Systems   All other systems reviewed and are negative.      Objective   OBJECTIVE   Vital Signs  Temp:  [97.6 °F (36.4 °C)] 97.6 °F (36.4 °C)  Heart Rate:  [66-67] 66  Resp:  [18] 18  BP: (113-146)/(55-62) 146/62    Flowsheet Rows    Flowsheet Row First Filed Value   Admission Height 157.5 cm (62\") Documented at 05/11/2023 1051   Admission Weight 111 kg (245 lb) Documented at 05/11/2023 1051           No intake/output data recorded.    PHYSICAL EXAM    Physical Exam  Constitutional:       Appearance: She is well-developed.   HENT:      Head: Normocephalic and atraumatic.   Eyes:      Conjunctiva/sclera: Conjunctivae normal.      Pupils: Pupils are equal, round, and reactive to light.   Cardiovascular:      Rate and Rhythm: Normal rate and regular rhythm.   Pulmonary:      Effort: Pulmonary effort is normal.      Breath sounds: Normal breath sounds.   Abdominal:      Palpations: Abdomen is soft.   Musculoskeletal:      Cervical back: Neck supple.      Right lower leg: No edema.      Left lower leg: No edema.   Skin:     General: Skin is warm and dry.   Neurological:      Mental Status: She is alert. She is disoriented.   Psychiatric:         Mood and Affect: Mood normal.         " Behavior: Behavior normal.         RESULTS   Results Review:    Results from last 7 days   Lab Units 05/11/23  1038   SODIUM mmol/L 139   POTASSIUM mmol/L 6.5*   CHLORIDE mmol/L 104   CO2 mmol/L 19.0*   BUN mg/dL 85*   CREATININE mg/dL 7.00*   CALCIUM mg/dL 9.4   BILIRUBIN mg/dL 0.4   ALK PHOS U/L 282*   ALT (SGPT) U/L 13   AST (SGOT) U/L 17   GLUCOSE mg/dL 60*       Estimated Creatinine Clearance: 9.5 mL/min (A) (by C-G formula based on SCr of 7 mg/dL (H)).    Results from last 7 days   Lab Units 05/11/23  1038   MAGNESIUM mg/dL 2.1             Results from last 7 days   Lab Units 05/11/23  1038   WBC 10*3/mm3 6.21   HEMOGLOBIN g/dL 8.7*   PLATELETS 10*3/mm3 187              MEDICATIONS          (Not in a hospital admission)    Assessment & Plan   ASSESSMENT / PLAN      ESRD (end stage renal disease) on dialysis    1.  ESRD on hemodialysis- normally dialyzes TTS at [facility] in [place].  She missed her dialysis on Tuesday. Plan HD today. Will run 1K for first part of the treatment UF goal is 3-4 L. Plan to use AVF on right - this has been used at the clinic as well. She has permcath has well .     2.  Hyperkalemia- HD today     3.  Anemia of CKD     4.  CAD     5.  DM2     6.  CKD-MBD     7.  Hypertension    8. uti         Thank you for the consult, we will continue to follow the patient.         I discussed the patients findings and my recommendations with patient      This document has been electronically signed by BRIDGETT Hadley on May 11, 2023 13:17 CDT      For this patient encounter, I have reviewed the Nurse Practitioner's documentation, medical decision making, and treatment plan and personally spent time with the patient.

## 2023-05-12 LAB
ANION GAP SERPL CALCULATED.3IONS-SCNC: 14 MMOL/L (ref 5–15)
BASOPHILS # BLD AUTO: 0.08 10*3/MM3 (ref 0–0.2)
BASOPHILS NFR BLD AUTO: 1.3 % (ref 0–1.5)
BUN SERPL-MCNC: 48 MG/DL (ref 8–23)
BUN/CREAT SERPL: 10.6 (ref 7–25)
CALCIUM SPEC-SCNC: 9.7 MG/DL (ref 8.6–10.5)
CHLORIDE SERPL-SCNC: 96 MMOL/L (ref 98–107)
CO2 SERPL-SCNC: 24 MMOL/L (ref 22–29)
CREAT SERPL-MCNC: 4.52 MG/DL (ref 0.57–1)
DEPRECATED RDW RBC AUTO: 50.5 FL (ref 37–54)
EGFRCR SERPLBLD CKD-EPI 2021: 10.3 ML/MIN/1.73
EOSINOPHIL # BLD AUTO: 0.33 10*3/MM3 (ref 0–0.4)
EOSINOPHIL NFR BLD AUTO: 5.5 % (ref 0.3–6.2)
ERYTHROCYTE [DISTWIDTH] IN BLOOD BY AUTOMATED COUNT: 14.9 % (ref 12.3–15.4)
GLUCOSE BLDC GLUCOMTR-MCNC: 135 MG/DL (ref 70–130)
GLUCOSE BLDC GLUCOMTR-MCNC: 173 MG/DL (ref 70–130)
GLUCOSE BLDC GLUCOMTR-MCNC: 223 MG/DL (ref 70–130)
GLUCOSE BLDC GLUCOMTR-MCNC: 71 MG/DL (ref 70–130)
GLUCOSE BLDC GLUCOMTR-MCNC: 88 MG/DL (ref 70–130)
GLUCOSE SERPL-MCNC: 86 MG/DL (ref 65–99)
HCT VFR BLD AUTO: 32.7 % (ref 34–46.6)
HGB BLD-MCNC: 10.3 G/DL (ref 12–15.9)
IMM GRANULOCYTES # BLD AUTO: 0.03 10*3/MM3 (ref 0–0.05)
IMM GRANULOCYTES NFR BLD AUTO: 0.5 % (ref 0–0.5)
LYMPHOCYTES # BLD AUTO: 1.76 10*3/MM3 (ref 0.7–3.1)
LYMPHOCYTES NFR BLD AUTO: 29.1 % (ref 19.6–45.3)
MCH RBC QN AUTO: 28.9 PG (ref 26.6–33)
MCHC RBC AUTO-ENTMCNC: 31.5 G/DL (ref 31.5–35.7)
MCV RBC AUTO: 91.9 FL (ref 79–97)
MONOCYTES # BLD AUTO: 0.54 10*3/MM3 (ref 0.1–0.9)
MONOCYTES NFR BLD AUTO: 8.9 % (ref 5–12)
NEUTROPHILS NFR BLD AUTO: 3.3 10*3/MM3 (ref 1.7–7)
NEUTROPHILS NFR BLD AUTO: 54.7 % (ref 42.7–76)
NRBC BLD AUTO-RTO: 0 /100 WBC (ref 0–0.2)
PLATELET # BLD AUTO: 233 10*3/MM3 (ref 140–450)
PMV BLD AUTO: 8.7 FL (ref 6–12)
POTASSIUM SERPL-SCNC: 4.9 MMOL/L (ref 3.5–5.2)
RBC # BLD AUTO: 3.56 10*6/MM3 (ref 3.77–5.28)
SODIUM SERPL-SCNC: 134 MMOL/L (ref 136–145)
WBC NRBC COR # BLD: 6.04 10*3/MM3 (ref 3.4–10.8)

## 2023-05-12 PROCEDURE — 96366 THER/PROPH/DIAG IV INF ADDON: CPT

## 2023-05-12 PROCEDURE — 82948 REAGENT STRIP/BLOOD GLUCOSE: CPT

## 2023-05-12 PROCEDURE — 85025 COMPLETE CBC W/AUTO DIFF WBC: CPT | Performed by: HOSPITALIST

## 2023-05-12 PROCEDURE — 96372 THER/PROPH/DIAG INJ SC/IM: CPT

## 2023-05-12 PROCEDURE — 63710000001 INSULIN ASPART PER 5 UNITS: Performed by: HOSPITALIST

## 2023-05-12 PROCEDURE — 93010 ELECTROCARDIOGRAM REPORT: CPT | Performed by: INTERNAL MEDICINE

## 2023-05-12 PROCEDURE — 25010000002 CEFTRIAXONE PER 250 MG: Performed by: HOSPITALIST

## 2023-05-12 PROCEDURE — 25010000002 HEPARIN (PORCINE) PER 1000 UNITS: Performed by: HOSPITALIST

## 2023-05-12 PROCEDURE — 63710000001 INSULIN DETEMIR PER 5 UNITS: Performed by: HOSPITALIST

## 2023-05-12 PROCEDURE — 93005 ELECTROCARDIOGRAM TRACING: CPT | Performed by: HOSPITALIST

## 2023-05-12 PROCEDURE — 80048 BASIC METABOLIC PNL TOTAL CA: CPT | Performed by: HOSPITALIST

## 2023-05-12 RX ADMIN — Medication 10 ML: at 20:24

## 2023-05-12 RX ADMIN — INSULIN ASPART 14 UNITS: 100 INJECTION, SOLUTION INTRAVENOUS; SUBCUTANEOUS at 18:36

## 2023-05-12 RX ADMIN — INSULIN DETEMIR 24 UNITS: 100 INJECTION, SOLUTION SUBCUTANEOUS at 20:41

## 2023-05-12 RX ADMIN — FUROSEMIDE 20 MG: 20 TABLET ORAL at 08:25

## 2023-05-12 RX ADMIN — LOSARTAN POTASSIUM 25 MG: 25 TABLET, FILM COATED ORAL at 08:24

## 2023-05-12 RX ADMIN — MEMANTINE 5 MG: 5 TABLET ORAL at 20:23

## 2023-05-12 RX ADMIN — OXYCODONE HYDROCHLORIDE 10 MG: 5 TABLET ORAL at 20:23

## 2023-05-12 RX ADMIN — CEFTRIAXONE SODIUM 1 G: 1 INJECTION, POWDER, FOR SOLUTION INTRAMUSCULAR; INTRAVENOUS at 08:25

## 2023-05-12 RX ADMIN — SEVELAMER CARBONATE 800 MG: 800 TABLET, FILM COATED ORAL at 18:36

## 2023-05-12 RX ADMIN — Medication 10 ML: at 08:25

## 2023-05-12 RX ADMIN — INSULIN ASPART 14 UNITS: 100 INJECTION, SOLUTION INTRAVENOUS; SUBCUTANEOUS at 11:04

## 2023-05-12 RX ADMIN — CLOPIDOGREL BISULFATE 75 MG: 75 TABLET ORAL at 08:25

## 2023-05-12 RX ADMIN — MEMANTINE 5 MG: 5 TABLET ORAL at 08:25

## 2023-05-12 RX ADMIN — HEPARIN SODIUM 5000 UNITS: 5000 INJECTION INTRAVENOUS; SUBCUTANEOUS at 20:23

## 2023-05-12 RX ADMIN — CETIRIZINE HYDROCHLORIDE 5 MG: 5 TABLET ORAL at 08:24

## 2023-05-12 RX ADMIN — CYCLOBENZAPRINE 5 MG: 5 TABLET, FILM COATED ORAL at 20:23

## 2023-05-12 RX ADMIN — ATORVASTATIN CALCIUM 40 MG: 40 TABLET, FILM COATED ORAL at 08:25

## 2023-05-12 RX ADMIN — INSULIN ASPART 4 UNITS: 100 INJECTION, SOLUTION INTRAVENOUS; SUBCUTANEOUS at 11:03

## 2023-05-12 RX ADMIN — HYDROXYZINE HYDROCHLORIDE 25 MG: 25 TABLET, FILM COATED ORAL at 08:25

## 2023-05-12 RX ADMIN — PANTOPRAZOLE SODIUM 40 MG: 40 TABLET, DELAYED RELEASE ORAL at 20:23

## 2023-05-12 RX ADMIN — OXYCODONE HYDROCHLORIDE 10 MG: 5 TABLET ORAL at 08:25

## 2023-05-12 RX ADMIN — DULOXETINE HYDROCHLORIDE 20 MG: 20 CAPSULE, DELAYED RELEASE ORAL at 08:25

## 2023-05-12 RX ADMIN — CYCLOBENZAPRINE 5 MG: 5 TABLET, FILM COATED ORAL at 08:24

## 2023-05-12 RX ADMIN — CARVEDILOL 6.25 MG: 6.25 TABLET, FILM COATED ORAL at 18:36

## 2023-05-12 RX ADMIN — HEPARIN SODIUM 5000 UNITS: 5000 INJECTION INTRAVENOUS; SUBCUTANEOUS at 08:25

## 2023-05-12 RX ADMIN — CARVEDILOL 6.25 MG: 6.25 TABLET, FILM COATED ORAL at 08:25

## 2023-05-12 RX ADMIN — SEVELAMER CARBONATE 800 MG: 800 TABLET, FILM COATED ORAL at 08:25

## 2023-05-12 RX ADMIN — DOCUSATE SODIUM 100 MG: 100 CAPSULE, LIQUID FILLED ORAL at 08:24

## 2023-05-12 RX ADMIN — SEVELAMER CARBONATE 800 MG: 800 TABLET, FILM COATED ORAL at 11:03

## 2023-05-12 RX ADMIN — ACETAMINOPHEN 650 MG: 325 TABLET, FILM COATED ORAL at 11:00

## 2023-05-12 RX ADMIN — OXYCODONE HYDROCHLORIDE 10 MG: 5 TABLET ORAL at 15:48

## 2023-05-12 NOTE — PROGRESS NOTES
Discharge Planning Assessment  [facility]     Patient Name: Yamileth Slater  MRN: [id number]  Today's Date: 5/12/2023    Admit Date: 5/11/2023    Plan: CYNTHIA Orozco RN   Discharge Needs Assessment     Row Name 05/12/23 1547       Living Environment    People in Home alone    Unique Family Situation sister is staying with her    Current Living Arrangements home    Duration at Residence 4 yrs    Potentially Unsafe Housing Conditions none    Primary Care Provided by self;other (see comments)  sister is helping    Provides Primary Care For no one, unable/limited ability to care for self    Family Caregiver if Needed sibling(s)  sister palak    Quality of Family Relationships helpful;supportive    Able to Return to Prior Arrangements yes       Resource/Environmental Concerns    Resource/Environmental Concerns none    Transportation Concerns none       Food Insecurity    Within the past 12 months, you worried that your food would run out before you got the money to buy more. Never true    Within the past 12 months, the food you bought just didn't last and you didn't have money to get more. Never true       Transition Planning    Patient/Family Anticipates Transition to home with help/services;home with family    Patient/Family Anticipated Services at Transition home health care    Transportation Anticipated family or friend will provide       Discharge Needs Assessment    Current Outpatient/Agency/Support Group homecare agency    Concerns to be Addressed denies needs/concerns at this time    Anticipated Changes Related to Illness inability to care for self    Equipment Needed After Discharge none    Outpatient/Agency/Support Group Needs clinic(s);homecare agency  [facility] and [facility]    Discharge Facility/Level of Care Needs home with home health    Patient's Choice of Community Agency(s) active with [facility]    Current Discharge Risk lives alone    Discharge Coordination/Progress sister is staying with pt at this  time to help her at home.    [facility]    has trilogy and o2 from [facility].   neb,  wheelchair,  prosthetic leg but has  not been able to walk on it yet.   uses a walker at home also.  no issues with rx or food.    uses pacs for transporation to HD               Discharge Plan     Row Name 05/12/23 1431       Plan    Plan Comments presents with c/o alter mental status  was sent from HD.  had missed HD on tuesday due to illness.   haldol im given x 1  has left bka and wound vac that was changed on right foot.  on  iv rocephin,  o2 at 2liters.   lalo rn              Continued Care and Services - Admitted Since 5/11/2023    Coordination has not been started for this encounter.     Selected Continued Care - Prior Encounters Includes continued care and service providers with selected services from prior encounters from 2/10/2023 to 5/12/2023    Discharged on 3/16/2023 Admission date: 3/15/2023 - Discharge disposition: Skilled Nursing Facility (DC - External)    Destination     Service Provider Selected Services Address Phone Fax Patient Preferred    [facility] [address] [place] [id number] [phone] --                    Expected Discharge Date and Time     Expected Discharge Date Expected Discharge Time    May 15, 2023          Demographic Summary     Row Name 05/12/23 1878       General Information    Admission Type inpatient    Arrived From home    Required Notices Provided Important Message from Medicare    Referral Source high risk screening    Reason for Consult discharge planning    Preferred Language English    General Information Comments confirmed face sheet and pharmacy               Functional Status    No documentation.                Psychosocial    No documentation.                Abuse/Neglect    No documentation.                Legal    No documentation.                Substance Abuse    No  documentation.                Patient Forms    No documentation.                   Noreen Chavez RN

## 2023-05-12 NOTE — NURSING NOTE
Patient well known to Dr. Oliveira and wound care center.  Wound vac to foot changed today to keep it on a MWF schedule.  White foam to exposed bone and black foam to cover.  Black foam bridged to top of foot and covered with drape.  Sealed with 125mmhg continuous suction.  Will follow up Monday.

## 2023-05-12 NOTE — PROGRESS NOTES
"Patient restless and attempting to pull out IV overnight.  We will attempt to ameliorate with Haldol 3 mg IM x1    Patient Vitals for the past 24 hrs:   BP Temp Temp src Pulse Resp SpO2 Height Weight   05/11/23 1929 -- -- -- 71 -- -- -- --   05/11/23 1914 118/56 97.1 °F (36.2 °C) Temporal 79 18 100 % -- 112 kg (247 lb 4.8 oz)   05/11/23 1900 150/72 98 °F (36.7 °C) Tympanic 72 18 -- -- --   05/11/23 1849 152/70 -- -- 74 20 -- -- --   05/11/23 1830 150/72 -- -- 74 20 -- -- --   05/11/23 1800 122/48 -- -- 73 18 -- -- --   05/11/23 1730 133/86 -- -- 75 18 -- -- --   05/11/23 1700 142/60 -- -- 68 18 -- -- --   05/11/23 1630 137/60 -- -- 70 20 -- -- --   05/11/23 1600 140/58 -- -- 66 18 -- -- --   05/11/23 1530 97/59 -- -- 66 16 -- -- --   05/11/23 1515 108/49 -- -- 67 20 -- -- --   05/11/23 1500 128/56 98.2 °F (36.8 °C) -- 68 20 -- -- --   05/11/23 1138 -- 97.6 °F (36.4 °C) Oral -- -- -- -- --   05/11/23 1051 -- -- -- -- -- -- 157.5 cm (62\") 111 kg (245 lb)   05/11/23 1040 146/62 -- -- 66 -- 90 % -- --   05/11/23 0959 113/55 -- -- 67 18 91 % -- --     BMP        3/15/2023    14:21 3/16/2023    06:51 5/11/2023    10:38   BMP   BUN 74   55   85     Creatinine 4.80   4.14   7.00     Sodium 135   132   139     Potassium 4.0   4.3   6.5     Chloride 100   93   104     CO2 22.0   19.0   19.0     Calcium 10.0   9.7   9.4        CBC        3/15/2023    14:21 3/16/2023    06:51 5/11/2023    10:38   CBC   WBC 9.95   10.22   6.21     RBC 3.62   4.00   3.03     Hemoglobin 10.3   11.4   8.7     Hematocrit 32.7   36.7   28.7     MCV 90.3   91.8   94.7     MCH 28.5   28.5   28.7     MCHC 31.5   31.1   30.3     RDW 16.1   16.2   15.4     Platelets 311   278   187       "

## 2023-05-12 NOTE — PROGRESS NOTES
"  [facility]  [address] [place] [address]. [address]  T - [phone]  F - [phone]     Progress Note          PATIENT  DEMOGRAPHICS   PATIENT NAME: Yamileth Slater                      PHYSICIAN: Ace Lauren MD  : 1960  MRN: [id number]   LOS: 0 days    Patient Care Team:  Kiersten Wilson APRN as PCP - General (Family Medicine)  Subjective   SUBJECTIVE   Feels some better. No n/v, no dizziness today          Objective   OBJECTIVE   Vital Signs  Temp:  [97 °F (36.1 °C)-98.2 °F (36.8 °C)] 97.6 °F (36.4 °C)  Heart Rate:  [] 75  Resp:  [16-20] 18  BP: ()/(48-88) 152/88    Flowsheet Rows    Flowsheet Row First Filed Value   Admission Height 157.5 cm (62\") Documented at 2023 1051   Admission Weight 111 kg (245 lb) Documented at 2023 1051           I/O last 3 completed shifts:  In: 130 [P.O.:30; IV Piggyback:100]  Out: 4125 [Urine:125; Other:4000]    PHYSICAL EXAM    Physical Exam  Constitutional:       Appearance: She is well-developed.   HENT:      Head: Normocephalic.   Eyes:      Pupils: Pupils are equal, round, and reactive to light.   Cardiovascular:      Rate and Rhythm: Normal rate and regular rhythm.      Heart sounds: Normal heart sounds.   Pulmonary:      Effort: Pulmonary effort is normal.      Breath sounds: Normal breath sounds.   Abdominal:      General: Bowel sounds are normal.      Palpations: Abdomen is soft.   Musculoskeletal:         General: No swelling.   Skin:     Coloration: Skin is not jaundiced.   Neurological:      General: No focal deficit present.      Mental Status: She is alert and oriented to person, place, and time.         RESULTS   Results Review:    Results from last 7 days   Lab Units 23  0734 23  1038   SODIUM mmol/L 134* 139   POTASSIUM mmol/L 4.9 6.5*   CHLORIDE mmol/L 96* 104   CO2 mmol/L 24.0 19.0*   BUN mg/dL 48* 85*   CREATININE mg/dL 4.52* 7.00*   CALCIUM mg/dL 9.7 9.4   BILIRUBIN mg/dL  --  0.4 "   ALK PHOS U/L  --  282*   ALT (SGPT) U/L  --  13   AST (SGOT) U/L  --  17   GLUCOSE mg/dL 86 60*       Estimated Creatinine Clearance: 14.8 mL/min (A) (by C-G formula based on SCr of 4.52 mg/dL (H)).    Results from last 7 days   Lab Units 05/11/23  1038   MAGNESIUM mg/dL 2.1             Results from last 7 days   Lab Units 05/12/23  0734 05/11/23  1038   WBC 10*3/mm3 6.04 6.21   HEMOGLOBIN g/dL 10.3* 8.7*   PLATELETS 10*3/mm3 233 187               Imaging Results (Last 24 Hours)     Procedure Component Value Units Date/Time    CT Head Without Contrast [[id number]] Collected: 05/11/23 1316     Updated: 05/11/23 1429    Narrative:      INDICATION:  Altered mental status.    COMPARISON:  05/25/2022.    TECHNIQUE:  Axial images were performed from the calvarium through the base of the skull  followed by 2D multiplanar reformats.    FINDINGS:  There is no mass, mass effect or midline shift.  The ventricles are midline and  symmetric.  There is no abnormal extra-axial fluid collection.  Mild small  vessel ischemic changes.   Pituitary and posterior fossa are unremarkable.  Calvarium is intact.      Impression:      No acute intracranial process.    XR Chest 1 View [[id number]] Collected: 05/11/23 1047     Updated: 05/11/23 1100    Narrative:      FINDINGS:  Right-sided PermCath is in place. Patient post median sternotomy. The heart is  enlarged. There is mild central vascular prominence. There are low lung volumes.  No focal pulmonary consolidations or pleural effusions.           MEDICATIONS    atorvastatin, 40 mg, Oral, Daily  carvedilol, 6.25 mg, Oral, BID With Meals  cefTRIAXone, 1 g, Intravenous, Q24H  cetirizine, 5 mg, Oral, Daily  clopidogrel, 75 mg, Oral, Daily  cyclobenzaprine, 5 mg, Oral, BID  docusate sodium, 100 mg, Oral, BID  DULoxetine, 20 mg, Oral, Daily  furosemide, 20 mg, Oral, Daily  heparin (porcine), 5,000 Units, Subcutaneous, Q12H  Insulin Aspart, 0-9 Units, Subcutaneous, TID AC  insulin aspart, 14  Units, Subcutaneous, TID With Meals  insulin detemir, 24 Units, Subcutaneous, Nightly  losartan, 25 mg, Oral, Daily  memantine, 5 mg, Oral, BID  oxyCODONE, 10 mg, Oral, TID  pantoprazole, 40 mg, Oral, Nightly  sevelamer, 800 mg, Oral, TID With Meals  sodium chloride, 10 mL, Intravenous, Q12H           Assessment & Plan   ASSESSMENT / PLAN      ESRD (end stage renal disease) on dialysis    1.  ESRD on hemodialysis- normally dialyzes TTS at [facility] in [place].  She missed her dialysis on Tuesday. Had hd on Thursday. Next hd tomorrow. Using avf or tunnel cath these days.    . Will run 1K for first part of the treatment UF goal is 3-4 L. Plan to use AVF on right tomorrow if possible     2.  Hyperkalemia- now better     3.  Anemia of CKD hgb stable     4.  CAD     5.  DM2     6.  CKD-MBD     7.  Hypertension     8. uti on rocephin                This document has been electronically signed by Ace Lauren MD on May 12, 2023 10:08 CDT

## 2023-05-12 NOTE — PROGRESS NOTES
[facility] Medicine Services  INPATIENT PROGRESS NOTE    Length of Stay: 0  Date of Admission: 5/11/2023  Primary Care Physician: Kiersten Wilson APRN    Subjective   Chief Complaint: Confusion  HPI: Patient states she is feeling better.  Feels less confused.  Does have back and neck pain.    As of today, 05/12/23  Review of Systems   Constitutional: Negative for appetite change, chills, fatigue, fever and unexpected weight change.   Respiratory: Negative for cough, choking, chest tightness, shortness of breath and wheezing.    Cardiovascular: Negative for chest pain, palpitations and leg swelling.   Gastrointestinal: Negative for abdominal pain, blood in stool, constipation, diarrhea, nausea and vomiting.   Genitourinary: Negative for dysuria, flank pain and hematuria.   Musculoskeletal: Positive for back pain and neck pain.   Neurological: Negative for dizziness, seizures, syncope, speech difficulty, weakness, light-headedness, numbness and headaches.   Hematological: Does not bruise/bleed easily.        All pertinent negatives and positives are as above. All other systems have been reviewed and are negative unless otherwise stated.    Objective    Temp:  [97 °F (36.1 °C)-98.2 °F (36.8 °C)] 97.4 °F (36.3 °C)  Heart Rate:  [] 67  Resp:  [16-20] 18  BP: ()/(48-88) 130/68    AM-PAC 6 Clicks Score (PT): 12 (05/12/23 0825)    As of today, 05/12/23  Physical Exam  Vitals reviewed.   Constitutional:       Appearance: She is well-developed.   HENT:      Head: Normocephalic and atraumatic.   Eyes:      Pupils: Pupils are equal, round, and reactive to light.   Cardiovascular:      Rate and Rhythm: Normal rate and regular rhythm.      Heart sounds: Normal heart sounds. No murmur heard.    No friction rub. No gallop.   Pulmonary:      Effort: Pulmonary effort is normal. No respiratory distress.      Breath sounds: Normal breath sounds. No wheezing or  November 20, 2020       Verify Pcp      Patient: Jason Brownlee   YOB: 1936   Date of Visit: 11/20/2020       Dear  Pcp:    Thank you for referring Jason Brownlee to me for evaluation. Below are my notes for this visit with him.    If you have questions, please do not hesitate to call me. I look forward to following your patient along with you.      Sincerely,        Carol Ann Wiseman CNP        CC: No Recipients  Carol Ann Wiseman CNP  11/20/2020  3:52 PM  Sign when Signing Visit  Subjective   Patient ID: Jason is a 84 year old male.    Chief Complaint   Patient presents with   • Follow-up     S/p CABG        History of Present Illness:  Rob is an 84-year-old patient of Dr. Delatorre is followed by us for coronary artery disease, hypertension, diastolic heart failure, hyperlipidemia and is recently status post three-vessel CABG (LIMA to LAD, SVG to OM to PDA) with left atrial appendage resection.    Overall, patient is feeling well.  He denies exertional chest pain, shortness of breath, headedness, dizziness and palpitations.  Daughter did report low heart rates in the 40s and his primary care physician discontinued his metoprolol.  His heart rate has come up nicely into the 60s and 70s.  He is not going to partake in cardiac rehab due to increased cases of COVID-19 but he continues with home PT.    Past Medical History:   Diagnosis Date   • CHF (congestive heart failure) (CMS/HCC)    • Diabetes (CMS/HCC)    • Dyslipidemia    • Hypertension    • Ventricular tachycardia (CMS/HCC)        Past Surgical History:   Procedure Laterality Date   • Ankle surgery  12/2004   • Coronary artery bypass graft  10/12/2020   • Ptca with stent  08/2001       History reviewed. No pertinent family history.    Social History     Tobacco Use   • Smoking status: Former Smoker   • Smokeless tobacco: Never Used   Substance Use Topics   • Alcohol use: Not Currently     Frequency: Never   • Drug use: Never              has No Known Allergies.     Review of Systems   Constitution: Negative for chills, fever, weight gain and weight loss.   HENT: Negative for hearing loss.    Cardiovascular: Negative for chest pain, claudication, dyspnea on exertion and palpitations.   Respiratory: Negative for cough, hemoptysis and shortness of breath.    Hematologic/Lymphatic: Does not bruise/bleed easily.   Skin: Negative for rash and suspicious lesions.   Musculoskeletal: Negative for arthritis.   Gastrointestinal: Negative for hematemesis, hematochezia and melena.   Genitourinary: Negative for hematuria.   Neurological: Negative for light-headedness.   Allergic/Immunologic: Negative for environmental allergies.       Physical Exam   Constitutional: He appears healthy. No distress.   Eyes: Conjunctivae are normal.   Neck: Normal range of motion. Neck supple. No JVD present.   Cardiovascular: Normal rate, regular rhythm, S1 normal, S2 normal, normal heart sounds, intact distal pulses and normal pulses.   Pulmonary/Chest: Effort normal and breath sounds normal. He has no wheezes. He has no rales. He exhibits no tenderness.   Abdominal: Soft. Bowel sounds are normal.   Musculoskeletal: Normal range of motion.         General: No edema.   Neurological: He is alert and oriented to person, place, and time.   Skin: Skin is warm and dry.       Visit Vitals  /65 (BP Location: LUE - Left upper extremity, Patient Position: Sitting, Cuff Size: Regular)   Pulse 71   Temp 97.9 °F (36.6 °C) (Temporal)   Ht 5' 7\" (1.702 m)   Wt 86.2 kg (190 lb)   SpO2 98%   BMI 29.76 kg/m²       LABS:  None    Assessment   Problem List Items Addressed This Visit     None          Plan:  I recommend the patient readd metoprolol succinate 12.5 mg nightly into his medication regimen.  Do feel the patient would benefit from beta-blockade in light of his recent CABG but we want to minimize his significant bradycardia.  We will trial him on metoprolol succinate 12.5 mg  rales.   Chest:      Chest wall: No tenderness.   Abdominal:      General: Bowel sounds are normal. There is no distension.      Palpations: Abdomen is soft.      Tenderness: There is no abdominal tenderness. There is no guarding.   Musculoskeletal:      Cervical back: Normal range of motion and neck supple.      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   Neurological:      Comments: More oriented today.  She knows person, place, season, and president.  She could not name the month.   Psychiatric:         Behavior: Behavior normal.         Thought Content: Thought content normal.           Results Review:  I have reviewed the labs, radiology results, and diagnostic studies.    Laboratory Data:   Results from last 7 days   Lab Units 05/12/23  0734 05/11/23  1038   SODIUM mmol/L 134* 139   POTASSIUM mmol/L 4.9 6.5*   CHLORIDE mmol/L 96* 104   CO2 mmol/L 24.0 19.0*   BUN mg/dL 48* 85*   CREATININE mg/dL 4.52* 7.00*   GLUCOSE mg/dL 86 60*   CALCIUM mg/dL 9.7 9.4   BILIRUBIN mg/dL  --  0.4   ALK PHOS U/L  --  282*   ALT (SGPT) U/L  --  13   AST (SGOT) U/L  --  17   ANION GAP mmol/L 14.0 16.0*     Estimated Creatinine Clearance: 14.8 mL/min (A) (by C-G formula based on SCr of 4.52 mg/dL (H)).  Results from last 7 days   Lab Units 05/11/23  1038   MAGNESIUM mg/dL 2.1         Results from last 7 days   Lab Units 05/12/23  0734 05/11/23  1038   WBC 10*3/mm3 6.04 6.21   HEMOGLOBIN g/dL 10.3* 8.7*   HEMATOCRIT % 32.7* 28.7*   PLATELETS 10*3/mm3 233 187           Culture Data:   No results found for: BLOODCX  Urine Culture   Date Value Ref Range Status   05/11/2023 >100,000 CFU/mL Escherichia coli (A)  Preliminary     No results found for: RESPCX  No results found for: WOUNDCX  No results found for: STOOLCX  No components found for: BODYFLD    Radiology Data:   Imaging Results (Last 24 Hours)     Procedure Component Value Units Date/Time    CT Head Without  Contrast [[id number]] Collected: 05/11/23 1316     Updated: 05/11/23 1429    Narrative:      INDICATION:  Altered mental status.    COMPARISON:  05/25/2022.    TECHNIQUE:  Axial images were performed from the calvarium through the base of the skull  followed by 2D multiplanar reformats.    FINDINGS:  There is no mass, mass effect or midline shift.  The ventricles are midline and  symmetric.  There is no abnormal extra-axial fluid collection.  Mild small  vessel ischemic changes.   Pituitary and posterior fossa are unremarkable.  Calvarium is intact.      Impression:      No acute intracranial process.          I have utilized all available immediate resources to obtain, update, or review the patient's current medications (including all prescriptions, over-the-counter products, herbals, cannabis/cannabidiol products, and vitamin/mineral/dietary (nutritional) supplements).       Assessment/Plan     Active Hospital Problems    Diagnosis    • **ESRD (end stage renal disease) on dialysis        Plan:    1.  Altered mental status: Most likely multifactorial.  Likely secondary to inflammation of urinary tract infection and uremia.  Better today.  Still some confusion.  2.  End-stage renal disease: Appreciate nephrology help.  Dialyzed yesterday.  3.  Hyperkalemia: Management with hemodialysis.  4.  Coronary artery disease: Continue home medication.  5.  Urinary tract infection: Continue empiric antibiotics.  Preliminary culture shows greater than 100,000 colony-forming units of E. coli.  6.  Diabetes mellitus: Long-acting insulin and sliding scale insulin.  7.  Morbid obesity  8.  DVT prophylaxis: Heparin.    Medical Decision Making  Number and Complexity of problems: 2 highly complex medical problems    Conditions and Status:        Condition is improving.     [facility] Data  External documents reviewed: Primary care and subspecialty notes  My EKG interpretation: Normal sinus rhythm  My plain film interpretation: No acute  nightly and he will return for an APN visit in 10 to 14 days.  At that time we will consider 24-hour Holter monitor to assess for any significant bradycardia or cardiac pauses.  Patient remains asymptomatic.  He is to call the office for any new or worsening symptoms    Current Medications    ACEBUTOLOL (SECTRAL) 200 MG CAPSULE    Take 1 capsule by mouth once daily    ASPIRIN 81 MG CHEWABLE TABLET    Chew 81 mg by mouth daily.    ATORVASTATIN (LIPITOR) 20 MG TABLET    Take 1 tablet by mouth at bedtime.    CALCITRIOL (ROCALTROL) 0.25 MCG CAPSULE    Take 0.5 mcg by mouth 2 times daily.     CHOLECALCIFEROL (VITAMIN D3) 5000 UNITS CHEW TAB    daily    COLCHICINE (COLCRYS) 0.6 MG TABLET    1/2 tablet twice a day    CYANOCOBALAMIN 500 MCG TABLET    Take 100 mcg by mouth daily.    DOXAZOSIN (CARDURA) 4 MG TABLET    Take 4 mg by mouth 2 times daily.     FERROUS SULFATE 325 (65 FE) MG TABLET    Take 325 mg by mouth daily (with breakfast).    FUROSEMIDE (LASIX) 40 MG TABLET    Take 40 mg by mouth daily.     GEMFIBROZIL (LOPID) 600 MG TABLET    Take 600 mg by mouth 2 times daily.     GLIMEPIRIDE (AMARYL) 4 MG TABLET    Take 4 mg by mouth 2 times daily.     HYDROCODONE-ACETAMINOPHEN (NORCO) 5-325 MG PER TABLET    Take 1 tablet by mouth every 6 hours as needed for Pain.    LIDOCAINE (LIDODERM) 5 % PATCH    Place 1 patch onto the skin every 24 hours. Remove patch 12 hours after applying    METFORMIN (GLUCOPHAGE) 500 MG TABLET    Take 500 mg by mouth.    METOPROLOL TARTRATE (LOPRESSOR) 25 MG TABLET    Take 12.5 mg by mouth every 12 hours.    MULTIPLE VITAMINS-MINERALS (MULTIVITAMIN ADULTS PO)    Take by mouth daily.    PANTOPRAZOLE (PROTONIX) 40 MG TABLET    Take 40 mg by mouth daily.    PIOGLITAZONE (ACTOS) 45 MG TABLET    Take 1 tablet by mouth daily.    POLYETHYLENE GLYCOL 3350 (MIRALAX PO)    Take by mouth daily.    POTASSIUM CHLORIDE (KLOR-CON M) 20 MEQ TREVER ER TABLET    20 mEq daily.    RIVAROXABAN (XARELTO) 15 MG TAB     Take 1 tablet by mouth at bedtime.    SITAGLIPTIN (JANUVIA) 100 MG TABLET    Take 100 mg by mouth daily.     TAMSULOSIN (FLOMAX) 0.4 MG CAP    0.4 mg daily.                      cardiopulmonary disease     Discussed with: Patient     Treatment Plan  As above    Care Planning  Shared decision making: Patient in agreement with plan of care  Code status and discussions: Full code    Disposition  Social Determinants of Health that impact treatment or disposition: None  I expect the patient to be discharged to skilled facility in 2-3 days.       The patient was evaluated during the global COVID-19 pandemic, and the diagnosis was suspected/considered upon their initial presentation.  Evaluation, treatment, and testing were consistent with current guidelines for patients who present with complaints or symptoms that may be related to COVID-19.    I confirmed that the patient's Advance Care Plan is present, code status is documented, or surrogate decision maker is listed in the patient's medical record.        This document has been electronically signed by Jef Ramires MD on May 12, 2023 12:35 CDT

## 2023-05-12 NOTE — PLAN OF CARE
Problem: Device-Related Complication Risk (Hemodialysis)  Goal: Safe, Effective Therapy Delivery  Outcome: Ongoing, Progressing     Problem: Hemodynamic Instability (Hemodialysis)  Goal: Effective Tissue Perfusion  Outcome: Ongoing, Progressing     Problem: Infection (Hemodialysis)  Goal: Absence of Infection Signs and Symptoms  Outcome: Ongoing, Progressing   Goal Outcome Evaluation:         Hemodynamically stable during dialysis but confused and pulling off tele. Does not stay re-oriented. Obtained ordered Uf 4L without difficulty. No issues with vascular access used (tunnel cath).

## 2023-05-12 NOTE — PAYOR COMM NOTE
"  [facility]  Case Managment Extender   Emilie Adams  (P) [phone]  (F) [phone]          REF# [id number]  Yamileth Slater (63 y.o. Female)     Date of Birth   1960    Social Security Number       Address   [address] [place] [place] [address]    Home Phone   [phone]    MRN   [id number]       Gnosticist   None    Marital Status   Legally                             Admission Date   5/11/23    Admission Type   Emergency    Admitting Provider   Jef Ramires MD    Attending Provider   Jef Ramires MD    Department, Room/Bed   [address], 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)   Code Status: CPR    Ht: 157.5 cm (62\")   Wt: 111 kg (244 lb 1.6 oz)    Admission Cmt: None   Principal Problem: ESRD (end stage renal disease) on dialysis [N18.6,Z99.2]                 Active Insurance as of 5/11/2023     Primary Coverage     Payor Plan Insurance Group Employer/Plan Group    ANTHEM MEDICARE REPLACEMENT ANTHEM MEDICARE ADVANTAGE [id number]     Payor Plan Address Payor Plan Phone Number Payor Plan Fax Number Effective Dates    [address] [phone]  1/1/2022 - None Entered    [facility] [address]       Subscriber Name Subscriber Birth Date Member ID       ALEKSYAMILETH CROOK 1960 [id number]           Secondary Coverage     Payor Plan Insurance Group Employer/Plan Group    [place]      Payor Plan Address Payor Plan Phone Number Payor Plan Fax Number Effective Dates    [address] [phone]  6/28/2019 - None Entered    [facility] [address]       Subscriber Name Subscriber Birth Date Member ID       YAMILETH SLATER " 1960 [id number]                 Emergency Contacts      (Rel.) Home Phone Work Phone Mobile Phone    RAMONA KHAN (Sister) -- -- [phone]    Yamileth Chavez (Daughter) [phone] -- [phone]    Suzanne Rivera (Daughter) [phone] -- [phone]               History & Physical      IkeJef MD at 05/11/23 6383                [facility] Medicine Admission      Date of Admission: 5/11/2023      Primary Care Physician: Kiersten Wilson APRN      Chief Complaint: Altered mental status    HPI: Patient is a 63-year-old female with multiple medical problems including coronary artery disease, CHF, COPD, and end-stage renal disease who presented to hemodialysis today and was noted to have altered mental status.  She was then sent to the emergency department for evaluation.  Of note patient normally dialyzes on Tuesdays, Thursdays, and Saturdays.  She did not dialyze on Tuesday.  Her last hemodialysis session was Saturday prior to admission.  Patient seen on hemodialysis.  She is unable to answer questions.  She is very fidgety.    Concurrent Medical History:  has a past medical history of Acute blood loss anemia (04/16/2017), Acute cystitis with hematuria (03/31/2021), Altered mental status (01/09/2022), Anxiety, CAD (coronary artery disease) (04/24/2021), Carotid artery stenosis, CHF (congestive heart failure), Chronic obstructive lung disease, CKD (chronic kidney disease) stage 4, GFR 15-29 ml/min, CKD (chronic kidney disease), symptom management only, stage 5 (10/05/2020), Colonic polyp, Coronary arteriosclerosis, Diabetes mellitus, Diabetic neuropathy, Ear pain, right (10/18/2021), Elevated troponin (10/12/2021), Generalized abdominal pain (07/01/2022), GERD (gastroesophageal reflux disease), GI bleed (05/13/2021), History of transfusion, Hypercholesterolemia, Hyperosmolar hyperglycemic state (HHS) (06/25/2022), Hypertension,  Hypokalemia (05/27/2022), Hypomagnesemia (06/27/2021), Morbid obesity, Nephrolithiasis, On mechanically assisted ventilation (06/26/2022), Peripheral vascular disease, Pleural effusion on right (06/26/2022), PVD (peripheral vascular disease), SIRS (systemic inflammatory response syndrome) (01/09/2022), Sleep apnea, Substance abuse, and Vitamin D deficiency.    Past Surgical History:  has a past surgical history that includes Colonoscopy; Carotid stent (Left); Cardiac catheterization (N/A, 07/14/2020); cystoscopy bladder stone lithotripsy (Bilateral); Esophagogastroduodenoscopy (N/A, 04/12/2021); Cardiac catheterization (N/A, 04/23/2021); Cardiac catheterization (N/A, 04/30/2021); Cardiac catheterization (N/A, 04/30/2021); Esophagogastroduodenoscopy (N/A, 05/14/2021); Colonoscopy (N/A, 05/14/2021); Coronary artery bypass graft (N/A, 2013); Interventional radiology procedure (N/A, 10/21/2021); Cardiac catheterization (Left, 11/13/2021); Interventional radiology procedure (N/A, 01/27/2022); Esophagogastroduodenoscopy (N/A, 01/28/2022); Hysterectomy; Laparoscopic tubal ligation; Tunneled venous catheter placement; Leg amputation, lower tibia/fibula (Left, 12/16/2022); and Finger amputation (Right, 2/27/2023).    Family History: family history includes Diabetes in her father; Heart attack in her father, half-brother, and half-brother; Heart disease in her brother, father, half-sister, and mother; Lung cancer in her mother; No Known Problems in her brother, brother, maternal grandfather, maternal grandmother, paternal grandfather, paternal grandmother, sister, sister, sister, sister, and sister; Pancreatic cancer in her half-sister.     Social History:  reports that she has been smoking cigarettes. She started smoking about 24 years ago. She has a 11.50 pack-year smoking history. She has never used smokeless tobacco. She reports that she does not currently use drugs after having used the following drugs: LSD, Marijuana,  and Methamphetamines. She reports that she does not drink alcohol.    Allergies:   Allergies   Allergen Reactions   • Adhesive Tape Hives, Other (See Comments) and Rash   • Nsaids Hives   • Latex Other (See Comments) and Hives   • Other Itching     Bandaids, MRSA, SURECLOSE   • Wound Dressing Adhesive Hives and Rash       Medications:   Prior to Admission medications    Medication Sig Start Date End Date Taking? Authorizing Provider   acetaminophen (Tylenol 8 Hour) 650 MG 8 hr tablet Take 1 tablet by mouth Every 8 (Eight) Hours As Needed for Mild Pain . 2/1/22   Blake Burris MD   albuterol (PROVENTIL) (2.5 MG/3ML) 0.083% nebulizer solution Inhale the contents of 1 vial by nebulization Every 4 (Four) Hours As Needed for Wheezing. 10/28/21   Haily Mcneil MD   albuterol sulfate  (90 Base) MCG/ACT inhaler Inhale 2 puffs Every 4 (Four) Hours As Needed for Wheezing. 3/26/21   Nirmal Calvillo MD   atorvastatin (LIPITOR) 40 MG tablet Take 1 tablet by mouth Daily.    ProviderCaio MD   Blood Glucose Monitoring Suppl ([facility] Blood Glucose Meter) w/Device kit 1 each 3 (Three) Times a Day. 10/9/20   Nirmal Calvillo MD   carvedilol (COREG) 6.25 MG tablet Take 1 tablet by mouth 2 (Two) Times a Day With Meals. 12/23/22   Jef Ramires MD   Cetirizine HCl 10 MG capsule Take 1 capsule by mouth Daily. 11/4/21   Caio Thompson MD   clopidogrel (PLAVIX) 75 MG tablet Take 1 tablet by mouth Daily. 3/26/21   Nirmal Calvillo MD   cyclobenzaprine (FLEXERIL) 5 MG tablet Take 1 tablet by mouth 2 (Two) Times a Day. 10/28/22   Kristie Kern DPM   Diclofenac Sodium (VOLTAREN) 1 % gel gel Apply 4 g topically to the appropriate area as directed 4 (Four) Times a Day As Needed (Ankle pain). 4/26/22   Kit Brar MD   docusate sodium (COLACE) 100 MG capsule Take 1 capsule by mouth 2 (Two) Times a Day. 1/1/21   Caio Thompson MD   DULoxetine (CYMBALTA) 20 MG capsule Take 1  "capsule by mouth Daily. Indications: Disease of the Peripheral Nerves 1/1/21   Caio Thompson MD   Easy Touch Insulin Syringe 30G X 5/16\" 0.5 ML misc USE AS DIRECTED WITH LEVEMIR 12/1/21   Caio Thompson MD   EASY TOUCH PEN NEEDLES 31G X 8 MM misc  8/7/20   Caio Thompson MD   furosemide (LASIX) 20 MG tablet Take 1 tablet by mouth Daily.    Caio Thompson MD   gabapentin (NEURONTIN) 300 MG capsule Take 1 capsule by mouth Daily for 3 days. 3/16/23 3/19/23  Esau Ferguson MD   hydrOXYzine (ATARAX) 25 MG tablet Take 25 mg by mouth Every 8 (Eight) Hours As Needed for Itching. 1/1/21   Caio Thompson MD   insulin detemir (LEVEMIR) 100 UNIT/ML injection Inject 24 Units under the skin into the appropriate area as directed 2 (Two) Times a Day. pt takes in the am and pm 4/12/23   Caio Thompson MD   Insulin Lispro, 1 Unit Dial, (HUMALOG) 100 UNIT/ML solution pen-injector Inject 14-35 Units under the skin into the appropriate area as directed 3 (Three) Times a Day With Meals. Takes 14 units with meals plus sliding scale  Sliding scale:   150-199 take 4 units  200-249 take 8 units  250-299 take 12 units  300-349 take 16 units  350-400 take 20 units  greater than 400, call physician 12/23/22   Jef Ramires MD   Insulin Pen Needle (NovoFine) 30G X 8 MM misc As directed 4 times daily 3/26/21   Nirmal Calvillo MD   Insulin Pen Needle 31G X 8 MM misc Use to inject insulin 4 (Four) Times a Day as directed. 10/28/21   Haily Mcneil MD   ipratropium-albuterol (DUO-NEB) 0.5-2.5 mg/3 ml nebulizer Take 3 mL by nebulization 4 (Four) Times a Day As Needed. 3/22/17   Caio Thompson MD   losartan (COZAAR) 25 MG tablet Take 1 tablet by mouth Daily. 12/24/22   Jef Ramires MD   memantine (NAMENDA) 5 MG tablet Take 1 tablet by mouth 2 (Two) Times a Day. 10/28/22   Kristie Kern, RAMIREZ   nitroglycerin (NITROSTAT) 0.4 MG SL tablet Place 0.4 mg under the tongue Every 5 (Five) " Minutes As Needed for Chest Pain (x 3 doses). 1/1/15   Caio Thompson MD   O2 (OXYGEN) Inhale 2 L/min Continuous. 1/1/21   Caio Thompson MD   ondansetron (ZOFRAN) 4 MG tablet Take 1 tablet by mouth Every 6 (Six) Hours As Needed for Nausea or Vomiting. 12/23/22   Jef Ramires MD   oxyCODONE (ROXICODONE) 10 MG tablet Take 10 mg by mouth 3 (Three) Times a Day. Indications: Acute Pain, Chronic Pain 4/14/23   Caio Thompson MD   pantoprazole (PROTONIX) 40 MG EC tablet Take 1 tablet by mouth Every Night. 4/26/22   Kit Brar MD   promethazine (PHENERGAN) 25 MG tablet Take 25 mg by mouth Every 6 (Six) Hours As Needed for Nausea or Vomiting. 11/4/22   Caio Thompson MD   sevelamer (RENVELA) 800 MG tablet Take 1 tablet by mouth 3 (Three) Times a Day With Meals. 1/26/22   Caio Thompson MD   Vitamin D, Ergocalciferol, 03718 units capsule Take 50,000 Units by mouth 1 (One) Time Per Week. Take on Wednesday  Indications: Kidney Failure Syndrome 1/1/21   Caio Thompson MD   insulin aspart (novoLOG FLEXPEN) 100 UNIT/ML solution pen-injector sc pen Inject 30 Units under the skin into the appropriate area as directed 3 (Three) Times a Day With Meals. 2/1/22 3/17/22  Blake Burris MD     I have utilized all available immediate resources to obtain, update, or review the patient's current medications (including all prescriptions, over-the-counter products, herbals, cannabis/cannabidiol products, and vitamin/mineral/dietary (nutritional) supplements).      Review of Systems:  Review of Systems   Unable to perform ROS: Mental status change      Otherwise complete ROS is negative except as mentioned above.    Physical Exam:   Temp:  [97.6 °F (36.4 °C)-98.2 °F (36.8 °C)] 98.2 °F (36.8 °C)  Heart Rate:  [66-70] 68  Resp:  [16-20] 18  BP: ()/(49-62) 142/60  Physical Exam  Constitutional:       Appearance: She is well-developed.   HENT:      Head: Normocephalic and  atraumatic.      Nose: Nose normal.   Eyes:      General: Lids are normal. No scleral icterus.     Conjunctiva/sclera: Conjunctivae normal.      Pupils: Pupils are equal, round, and reactive to light.   Neck:      Vascular: No JVD.      Trachea: No tracheal tenderness or tracheal deviation.   Cardiovascular:      Rate and Rhythm: Normal rate and regular rhythm.      Pulses: Normal pulses.      Heart sounds: Normal heart sounds, S1 normal and S2 normal. No murmur heard.    No friction rub. No gallop.   Pulmonary:      Effort: Pulmonary effort is normal. No accessory muscle usage or respiratory distress.      Breath sounds: Normal breath sounds. No decreased breath sounds, wheezing or rales.   Chest:      Chest wall: No tenderness.   Abdominal:      General: Bowel sounds are normal. There is no distension.      Palpations: Abdomen is soft. There is no mass.      Tenderness: There is no abdominal tenderness. There is no guarding or rebound.   Musculoskeletal:         General: No tenderness.      Cervical back: Normal range of motion and neck supple. No spinous process tenderness.      Comments: Left BKA   Feet:      Comments: Status post right great toe amputation.  Wound VAC on right foot  Skin:     General: Skin is warm.      Coloration: Skin is not pale.      Findings: No rash.   Neurological:      Mental Status: She is alert. She is disoriented.      Cranial Nerves: No cranial nerve deficit.      Sensory: No sensory deficit.      Motor: No atrophy, abnormal muscle tone or seizure activity.      Coordination: Coordination normal.      Deep Tendon Reflexes: Reflexes are normal and symmetric. Reflexes normal.   Psychiatric:         Behavior: Behavior normal.         Thought Content: Thought content normal.         Judgment: Judgment normal.           Results Reviewed:  I have personally reviewed current lab, radiology, and data and agree with results.  Lab Results (last 24 hours)     Procedure Component Value Units  Date/Time    Hepatitis B Surface Antigen [[id number]]  (Normal) Collected: 05/11/23 1321    Specimen: Blood Updated: 05/11/23 1416     Hepatitis B Surface Ag Non-Reactive    High Sensitivity Troponin T 2Hr [[id number]]  (Abnormal) Collected: 05/11/23 1321    Specimen: Blood Updated: 05/11/23 1401     HS Troponin T 54 ng/L      Troponin T Delta -2 ng/L     Narrative:      High Sensitive Troponin T Reference Range:  <10.0 ng/L- Negative Female for AMI  <15.0 ng/L- Negative Male for AMI  >=10 - Abnormal Female indicating possible myocardial injury.  >=15 - Abnormal Male indicating possible myocardial injury.   Clinicians would have to utilize clinical acumen, EKG, Troponin, and serial changes to determine if it is an Acute Myocardial Infarction or myocardial injury due to an underlying chronic condition.         [place] Draw [[id number]] Collected: 05/11/23 1038    Specimen: Blood Updated: 05/11/23 1145    Narrative:      The following orders were created for panel order [place] Draw.  Procedure                               Abnormality         Status                     ---------                               -----------         ------                     Green Top (Gel)[[id number]]                                  Final result               Lavender Top[[id number]]                                     Final result               Gold Top - SST[[id number]]                                                              Light Blue Top[[id number]]                                   Final result                 Please view results for these tests on the individual orders.    Green Top (Gel) [[id number]] Collected: 05/11/23 1038    Specimen: Blood Updated: 05/11/23 1145     Extra Tube Hold for add-ons.     Comment: Auto resulted.       Light Blue Top [[id number]] Collected: 05/11/23 1038    Specimen: Blood Updated: 05/11/23 1145     Extra Tube Hold for add-ons.     Comment: Auto resulted       Lavender Top [[id number]] Collected:  05/11/23 1038    Specimen: Blood Updated: 05/11/23 1145     Extra Tube hold for add-on     Comment: Auto resulted       Comprehensive Metabolic Panel [[id number]]  (Abnormal) Collected: 05/11/23 1038    Specimen: Blood Updated: 05/11/23 1134     Glucose 60 mg/dL      BUN 85 mg/dL      Creatinine 7.00 mg/dL      Sodium 139 mmol/L      Potassium 6.5 mmol/L      Chloride 104 mmol/L      CO2 19.0 mmol/L      Calcium 9.4 mg/dL      Total Protein 7.7 g/dL      Albumin 3.5 g/dL      ALT (SGPT) 13 U/L      AST (SGOT) 17 U/L      Alkaline Phosphatase 282 U/L      Total Bilirubin 0.4 mg/dL      Globulin 4.2 gm/dL      A/G Ratio 0.8 g/dL      BUN/Creatinine Ratio 12.1     Anion Gap 16.0 mmol/L      eGFR 6.1 mL/min/1.73      Comment: <15 Indicative of kidney failure       Narrative:      GFR Normal >60  Chronic Kidney Disease <60  Kidney Failure <15      High Sensitivity Troponin T [[id number]]  (Abnormal) Collected: 05/11/23 1038    Specimen: Blood Updated: 05/11/23 1133     HS Troponin T 56 ng/L     Narrative:      High Sensitive Troponin T Reference Range:  <10.0 ng/L- Negative Female for AMI  <15.0 ng/L- Negative Male for AMI  >=10 - Abnormal Female indicating possible myocardial injury.  >=15 - Abnormal Male indicating possible myocardial injury.   Clinicians would have to utilize clinical acumen, EKG, Troponin, and serial changes to determine if it is an Acute Myocardial Infarction or myocardial injury due to an underlying chronic condition.         Magnesium [[id number]]  (Normal) Collected: 05/11/23 1038    Specimen: Blood Updated: 05/11/23 1132     Magnesium 2.1 mg/dL     Procalcitonin [[id number]]  (Abnormal) Collected: 05/11/23 1038    Specimen: Blood Updated: 05/11/23 1117     Procalcitonin 0.27 ng/mL     Narrative:      As a Marker for Sepsis (Non-Neonates):    1. <0.5 ng/mL represents a low risk of severe sepsis and/or septic shock.  2. >2 ng/mL represents a high risk of severe sepsis and/or septic shock.    As a  "Marker for Lower Respiratory Tract Infections that require antibiotic therapy:    PCT on Admission    Antibiotic Therapy       6-12 Hrs later    >0.5                Strongly Recommended  >0.25 - <0.5        Recommended   0.1 - 0.25          Discouraged              Remeasure/reassess PCT  <0.1                Strongly Discouraged     Remeasure/reassess PCT    As 28 day mortality risk marker: \"Change in Procalcitonin Result\" (>80% or <=80%) if Day 0 (or Day 1) and Day 4 values are available. Refer to http://www.[facility]-pct-calculator.com    Change in PCT <=80%  A decrease of PCT levels below or equal to 80% defines a positive change in PCT test result representing a higher risk for 28-day all-cause mortality of patients diagnosed with severe sepsis for septic shock.    Change in PCT >80%  A decrease of PCT levels of more than 80% defines a negative change in PCT result representing a lower risk for 28-day all-cause mortality of patients diagnosed with severe sepsis or septic shock.       Fentanyl, Urine - Urine, Clean Catch [[id number]]  (Normal) Collected: 05/11/23 1050    Specimen: Urine, Clean Catch Updated: 05/11/23 1116     Fentanyl, Urine Negative    Narrative:      Negative Threshold:      Fentanyl 5 ng/mL     The normal value for the drug tested is negative. This report includes final unconfirmed screening results to be used for medical treatment purposes only. Unconfirmed results must not be used for non-medical purposes such as employment or legal testing. Clinical consideration should be applied to any drug of abuse test, particularly when unconfirmed results are used.           Urine Drug Screen - Urine, Clean Catch [[id number]]  (Abnormal) Collected: 05/11/23 1050    Specimen: Urine, Clean Catch Updated: 05/11/23 1112     THC, Screen, Urine Negative     Phencyclidine (PCP), Urine Negative     Cocaine Screen, Urine Negative     Methamphetamine, Ur Negative     Opiate Screen Negative     Amphetamine Screen, " Urine Negative     Benzodiazepine Screen, Urine Negative     Tricyclic Antidepressants Screen Negative     Methadone Screen, Urine Negative     Barbiturates Screen, Urine Negative     Oxycodone Screen, Urine Positive     Propoxyphene Screen Negative     Buprenorphine, Screen, Urine Negative    Narrative:      Cutoff For Drugs Screened:    Amphetamines               500 ng/ml  Barbiturates               200 ng/ml  Benzodiazepines            150 ng/ml  Cocaine                    150 ng/ml  Methadone                  200 ng/ml  Opiates                    100 ng/ml  Phencyclidine               25 ng/ml  THC                            50 ng/ml  Methamphetamine            500 ng/ml  Tricyclic Antidepressants  300 ng/ml  Oxycodone                  100 ng/ml  Propoxyphene               300 ng/ml  Buprenorphine               10 ng/ml    The normal value for all drugs tested is negative. This report includes unconfirmed screening results, with the cutoff values listed, to be used for medical treatment purposes only.  Unconfirmed results must not be used for non-medical purposes such as employment or legal testing.  Clinical consideration should be applied to any drug of abuse test, particularly when unconfirmed results are used.      Urinalysis, Microscopic Only - Urine, Catheter [[id number]]  (Abnormal) Collected: 05/11/23 1050    Specimen: Urine, Catheter Updated: 05/11/23 1103     RBC, UA 0-2 /HPF      WBC, UA Too Numerous to Count /HPF      Bacteria, UA None Seen /HPF      Squamous Epithelial Cells, UA 0-2 /HPF      Hyaline Casts, UA None Seen /LPF      Methodology Automated Microscopy    Urinalysis With Culture If Indicated - Urine, Catheter [[id number]]  (Abnormal) Collected: 05/11/23 1050    Specimen: Urine, Catheter Updated: 05/11/23 1103     Color, UA Yellow     Appearance, UA Turbid     pH, UA 6.5     Specific Gravity, UA 1.015     Glucose, UA Negative     Ketones, UA Negative     Bilirubin, UA Negative     Blood, UA  Moderate (2+)     Protein, UA >=300 mg/dL (3+)     Leuk Esterase, UA Large (3+)     Nitrite, UA Negative     Urobilinogen, UA 0.2 E.U./dL    Narrative:      In absence of clinical symptoms, the presence of pyuria, bacteria, and/or nitrites on the urinalysis result does not correlate with infection.    Urine Culture - Urine, Urine, Catheter [[id number]] Collected: 05/11/23 1050    Specimen: Urine, Catheter Updated: 05/11/23 1103    Lactic Acid, Plasma [[id number]]  (Normal) Collected: 05/11/23 1038    Specimen: Blood Updated: 05/11/23 1102     Lactate 0.8 mmol/L     CBC & Differential [[id number]]  (Abnormal) Collected: 05/11/23 1038    Specimen: Blood Updated: 05/11/23 1049    Narrative:      The following orders were created for panel order CBC & Differential.  Procedure                               Abnormality         Status                     ---------                               -----------         ------                     CBC Auto Differential[[id number]]        Abnormal            Final result                 Please view results for these tests on the individual orders.    CBC Auto Differential [[id number]]  (Abnormal) Collected: 05/11/23 1038    Specimen: Blood Updated: 05/11/23 1049     WBC 6.21 10*3/mm3      RBC 3.03 10*6/mm3      Hemoglobin 8.7 g/dL      Hematocrit 28.7 %      MCV 94.7 fL      MCH 28.7 pg      MCHC 30.3 g/dL      RDW 15.4 %      RDW-SD 53.5 fl      MPV 8.4 fL      Platelets 187 10*3/mm3      Neutrophil % 53.8 %      Lymphocyte % 32.2 %      Monocyte % 7.9 %      Eosinophil % 4.8 %      Basophil % 1.0 %      Immature Grans % 0.3 %      Neutrophils, Absolute 3.34 10*3/mm3      Lymphocytes, Absolute 2.00 10*3/mm3      Monocytes, Absolute 0.49 10*3/mm3      Eosinophils, Absolute 0.30 10*3/mm3      Basophils, Absolute 0.06 10*3/mm3      Immature Grans, Absolute 0.02 10*3/mm3      nRBC 0.0 /100 WBC         Imaging Results (Last 24 Hours)     Procedure Component Value Units Date/Time    CT Head  Without Contrast [[id number]] Collected: 05/11/23 1316     Updated: 05/11/23 1429    Narrative:      INDICATION:  Altered mental status.    COMPARISON:  05/25/2022.    TECHNIQUE:  Axial images were performed from the calvarium through the base of the skull  followed by 2D multiplanar reformats.    FINDINGS:  There is no mass, mass effect or midline shift.  The ventricles are midline and  symmetric.  There is no abnormal extra-axial fluid collection.  Mild small  vessel ischemic changes.   Pituitary and posterior fossa are unremarkable.  Calvarium is intact.      Impression:      No acute intracranial process.    XR Chest 1 View [[id number]] Collected: 05/11/23 1047     Updated: 05/11/23 1100    Narrative:      FINDINGS:  Right-sided PermCath is in place. Patient post median sternotomy. The heart is  enlarged. There is mild central vascular prominence. There are low lung volumes.  No focal pulmonary consolidations or pleural effusions.            Assessment:    Active Hospital Problems    Diagnosis    • **ESRD (end stage renal disease) on dialysis              Plan:  1.  Altered mental status: Most likely multifactorial.  Likely secondary to inflammation of urinary tract infection and uremia.  Patient will be dialyzed and urinary tract infection was treated.  2.  End-stage renal disease: Nephrology has been consulted for hemodialysis.  3.  Hyperkalemia: Management with hemodialysis.  4.  Coronary artery disease: Continue home medication.  5.  Urinary tract infection, will empirically treat while urine cultures are pending.  6.  Diabetes mellitus: Long-acting insulin and sliding scale insulin.  7.  Morbid obesity  8.  DVT prophylaxis: Heparin.      Medical Decision Making  Number and Complexity of problems: 2 highly complex medical problems    Conditions and Status:        Condition is unchanged.     [facility] Data  External documents reviewed: Outpatient primary care and subspecialty notes  My EKG interpretation: Normal  sinus rhythm  My plain film interpretation: No acute cardiopulmonary disease       Discussed with: Patient and nursing     Treatment Plan  As above    Care Planning  Shared decision making: Patient unable to participate in shared decision making  Code status and discussions: Full code    Disposition  Social Determinants of Health that impact treatment or disposition: Dialysis compliance  I expect the patient to be discharged to home in 1-2 days.      I confirmed that the patient's Advance Care Plan is present, code status is documented, or surrogate decision maker is listed in the patient's medical record.            This document has been electronically signed by Jef Ramires MD on May 11, 2023 17:37 CDT                    Electronically signed by Jef Ramires MD at 05/11/23 1851          Emergency Department Notes      Abel Schneider, RN at 05/11/23 1001        Pt arrives via EMS from dialysis with c/o confusion, shaking and dizziness. Pt is T/T/Saturday. Pt states last dialysis was 5/6. Pt did not start dialysis today.    Electronically signed by Abel Schneider RN at 05/11/23 1002     Irasema Hall APRN at 05/11/23 1055      Procedure Orders    1. ECG 12 Lead [[id number]] ordered by Irasema Hall APRN          Attestation signed by Bladimir Morillo MD at 05/11/23 1244    I was available for consultation by the NP/PA in the emergency department during the time the patient was evaluated but I did not physically see the patient or examine them.  Bladimir Morillo MD 5/11/2023 12:44 CDT                         Subjective   History of Present Illness  Patient was sent from dialysis to the ER for AMS and dizziness. Patient's last dialysis was on Saturday 5/6/23. She states she missed dialysis Tuesday due to not feeling well. Patient states she has generalized weakness, not feeling well, dizziness/lightheadedness (no room spinning), and and nausea. Denies vomiting, CP, or SOB. Patient does  not provide much more information to HPI due to falling asleep while answering questions.         Review of Systems   Unable to perform ROS: Mental status change       Past Medical History:   Diagnosis Date   • Acute blood loss anemia 04/16/2017    Likely due to gastric oozing at this time. - Dr. Duarte (GI) was consulted and has now signed off, will follow up outpatient - pill colonoscopy showed AVMs - continue to monitor   • Acute cystitis with hematuria 03/31/2021 1/13: IV Rocephin 1 gm q 24 1/14 : transitioned  to omnicef 300mg. Urine cultures resulted and did not show growth. Omnicef discontinued as patient is asymptomatic   • Altered mental status 01/09/2022    - AMS on presentation - initial ABG pH 7.3, CO2 34 - Procal 0.29 - UA negative for acute cysitits -CTA head wnl  - Empiric Zosyn and Vancomycin -Lactate 2.5 on admission  - blood cultures no growth at 24 hours     • Anxiety    • CAD (coronary artery disease) 04/24/2021    S/P 3 stents 5/1/2021 for [facility]HL Continue ASA 81mg & Clopidogrel 75mg Continue Atorvastatin 40mg   • Carotid artery stenosis    • CHF (congestive heart failure)    • Chronic obstructive lung disease    • CKD (chronic kidney disease) stage 4, GFR 15-29 ml/min    • CKD (chronic kidney disease), symptom management only, stage 5 10/05/2020    Results from last 7 days Lab Units 12/15/21 0548 12/14/21 1323 12/14/21 0916 CREATININE mg/dL 3.92* 3.21* 3.32*  Baseline creatinine 2-3 GFR 13-25 GFR 15 Dialysis MWF, sees Dr. Lauren Nephrology consult,, appreciate recommendations Continue Bumex 1mg bid daily Holding Bumex 2mg 4 times a week   • Colonic polyp    • Coronary arteriosclerosis    • Diabetes mellitus    • Diabetic neuropathy    • Ear pain, right 10/18/2021    - canal trauma due to patient scratching and DMT2 - added cortisporin ear drops   • Elevated troponin 10/12/2021    -most likely from CKD -Trending down -Neg chest pain   • Generalized abdominal pain 07/01/2022    Could be due to  initiation of tube feeds vs dyspepsia vs abdominal cramps related to no PO intake due to intubation vs constipation Continue current laxative regiment  If no bowel movement by this afternoon will consider enema   • GERD (gastroesophageal reflux disease)    • GI bleed 05/13/2021    - GI will follow up outpatient - Protonix 40mg daily - Avoid medical DVT prophy and use mechanical at this time instead. - Continue to monitor - pill colonoscopy results showed AVMs   • History of transfusion    • Hypercholesterolemia    • Hyperosmolar hyperglycemic state (HHS) 06/25/2022    Serum glucose 605 on admission  Anion gap 16 PH 7.37 Bicarb 27.9 Continue fluids  Insulin drip with [facility] protocol  Anion gap closed around 10 AM, received one dose of Levamir subq, will stop insulin drip after 2 hrs     • Hypertension    • Hypokalemia 05/27/2022    Will replace as needed. Will be cautious in the setting of ESRD to avoid need for emergency dialysis   Monitor Qtc intervals on EKG     • Hypomagnesemia 06/27/2021    Monitor and replace   • Morbid obesity    • Nephrolithiasis    • On mechanically assisted ventilation 06/26/2022    Vent management and sedation orders placed.  - [facility] intensivist group consulted for vent management appreciate recommendations  - plan to extubate today     • Peripheral vascular disease    • Pleural effusion on right 06/26/2022    CXR on 6/30/22 read as a small upper left pulmonary edema vs early pneumonia.  Last Echo 1/2022 EF 61-65 % Continue to monitor  Procal slightly improved, CRP improved On Linezolid and meropenum      • PVD (peripheral vascular disease)    • SIRS (systemic inflammatory response syndrome) 01/09/2022    Admission  - WBC 17.78   -   - RR 16 - 1/10: VSS/wnl - CXR - Mild pulm edema - Blood cultures no growth at 24 hours  - Procalcitonin 0.29 - UA : glucose 1000, negative Leucocytes/nitrate - Empiric Zosyn and Vancomcyin    • Sleep apnea    • Substance abuse    • Vitamin D  deficiency        Allergies   Allergen Reactions   • Adhesive Tape Hives, Other (See Comments) and Rash   • Nsaids Hives   • Latex Other (See Comments) and Hives   • Other Itching     Bandaids, MRSA, SURECLOSE   • Wound Dressing Adhesive Hives and Rash       Past Surgical History:   Procedure Laterality Date   • AMPUTATION DIGIT Right 2/27/2023    Procedure: Excision of right first metatarsal head, right second toe amputation;  Surgeon: Jean Oliveira DPM;  Location: [facility] OR;  Service: Podiatry;  Laterality: Right;   • BELOW KNEE AMPUTATION Left 12/16/2022    Procedure: AMPUTATION BELOW KNEE, LEFT;  Surgeon: Huy White MD;  Location: [facility] OR;  Service: Orthopedics;  Laterality: Left;   • CARDIAC CATHETERIZATION N/A 07/14/2020   • CARDIAC CATHETERIZATION N/A 04/23/2021    Procedure: Left Heart Cath;  Surgeon: Melba Romo MD;  Location: [facility] CATH INVASIVE LOCATION;  Service: Cardiology;  Laterality: N/A;   • CARDIAC CATHETERIZATION N/A 04/30/2021    Procedure: Percutaneous Coronary Intervention;  Surgeon: Russell Voss MD;  Location: [facility] CATH INVASIVE LOCATION;  Service: Cardiovascular;  Laterality: N/A;   • CARDIAC CATHETERIZATION N/A 04/30/2021    Procedure: Stent NIKKI coronary;  Surgeon: Russell Voss MD;  Location: [facility] CATH INVASIVE LOCATION;  Service: Cardiovascular;  Laterality: N/A;   • CARDIAC CATHETERIZATION Left 11/13/2021    Procedure: Left Heart Cath;  Surgeon: Niall Rios MD;  Location: [facility] CATH INVASIVE LOCATION;  Service: Cardiology;  Laterality: Left;   • CAROTID STENT Left    • COLONOSCOPY     • COLONOSCOPY N/A 05/14/2021    Procedure: COLONOSCOPY;  Surgeon: Mingo Duarte MD;  Location: [facility] ENDOSCOPY;  Service: Gastroenterology;  Laterality: N/A;   • CORONARY ARTERY BYPASS GRAFT N/A 2013    CABG X 3   • CYSTOSCOPY BLADDER STONE LITHOTRIPSY Bilateral    • ENDOSCOPY N/A 04/12/2021    Procedure: ESOPHAGOGASTRODUODENOSCOPY;  Surgeon:  Mingo Duarte MD;  Location: [facility] ENDOSCOPY;  Service: Gastroenterology;  Laterality: N/A;   • ENDOSCOPY N/A 05/14/2021    Procedure: ESOPHAGOGASTRODUODENOSCOPY;  Surgeon: Mingo Duarte MD;  Location: [facility] ENDOSCOPY;  Service: Gastroenterology;  Laterality: N/A;   • ENDOSCOPY N/A 01/28/2022    Procedure: ESOPHAGOGASTRODUODENOSCOPY;  Surgeon: Mingo Duarte MD;  Location: [facility] ENDOSCOPY;  Service: Gastroenterology;  Laterality: N/A;   • HYSTERECTOMY     • INTERVENTIONAL RADIOLOGY PROCEDURE N/A 10/21/2021    Procedure: tunneled central venous catheter placement;  Surgeon: Donnie Robles MD;  Location: [facility] ANGIO INVASIVE LOCATION;  Service: Interventional Radiology;  Laterality: N/A;   • INTERVENTIONAL RADIOLOGY PROCEDURE N/A 01/27/2022    Procedure: tunneled central venous catheter placement;  Surgeon: Donnie Robles MD;  Location: [facility] ANGIO INVASIVE LOCATION;  Service: Interventional Radiology;  Laterality: N/A;   • LAPAROSCOPIC TUBAL LIGATION     • TUNNELED VENOUS CATHETER PLACEMENT         Family History   Problem Relation Age of Onset   • Heart disease Mother    • Lung cancer Mother    • Heart disease Father    • Heart attack Father    • Diabetes Father    • Heart disease Half-Sister         Dad's side   • Heart disease Brother    • No Known Problems Sister    • No Known Problems Sister    • No Known Problems Sister    • No Known Problems Sister    • No Known Problems Sister    • Pancreatic cancer Half-Sister         Dad's side   • No Known Problems Brother    • No Known Problems Brother    • Heart attack Half-Brother    • Heart attack Half-Brother    • No Known Problems Maternal Grandmother    • No Known Problems Maternal Grandfather    • No Known Problems Paternal Grandmother    • No Known Problems Paternal Grandfather        Social History     Socioeconomic History   • Marital status: Legally      Spouse name: wesley dumont   Tobacco Use   • Smoking status:  "Every Day     Packs/day: 0.25     Years: 46.00     Pack years: 11.50     Types: Cigarettes     Start date: 2/1/1999   • Smokeless tobacco: Never   Vaping Use   • Vaping Use: Never used   Substance and Sexual Activity   • Alcohol use: No   • Drug use: Not Currently     Types: LSD, Marijuana, Methamphetamines   • Sexual activity: Defer           Objective    /62   Pulse 66   Temp 97.6 °F (36.4 °C) (Oral)   Resp 18   Ht 157.5 cm (62\")   Wt 111 kg (245 lb)   LMP  (LMP Unknown)   SpO2 90%   BMI 44.81 kg/m²     Physical Exam  Vitals and nursing note reviewed.   Constitutional:       Appearance: She is ill-appearing.   HENT:      Head: Normocephalic and atraumatic.   Cardiovascular:      Rate and Rhythm: Normal rate and regular rhythm.      Heart sounds: Normal heart sounds.   Pulmonary:      Effort: Pulmonary effort is normal.      Breath sounds: Normal breath sounds. No wheezing or rhonchi.   Abdominal:      General: Bowel sounds are normal.      Palpations: Abdomen is soft.      Tenderness: There is no abdominal tenderness.   Musculoskeletal:         General: Normal range of motion.      Cervical back: Normal range of motion and neck supple.      Comments: Patient with BKA on left   Neurological:      Mental Status: She is alert.         ECG 12 Lead      Date/Time: 5/11/2023 10:38 AM  Performed by: Irasema Hall APRN  Authorized by: Bladimir Morillo MD   Interpreted by physician  Rhythm: sinus rhythm  Rate: normal  BPM: 65  Clinical impression: abnormal ECG  Comments: Nonspecific intraventricular block        Results for orders placed or performed during the hospital encounter of 05/11/23   Comprehensive Metabolic Panel    Specimen: Blood   Result Value Ref Range    Glucose 60 (L) 65 - 99 mg/dL    BUN 85 (H) 8 - 23 mg/dL    Creatinine 7.00 (H) 0.57 - 1.00 mg/dL    Sodium 139 136 - 145 mmol/L    Potassium 6.5 (C) 3.5 - 5.2 mmol/L    Chloride 104 98 - 107 mmol/L    CO2 19.0 (L) 22.0 - 29.0 mmol/L "    Calcium 9.4 8.6 - 10.5 mg/dL    Total Protein 7.7 6.0 - 8.5 g/dL    Albumin 3.5 3.5 - 5.2 g/dL    ALT (SGPT) 13 1 - 33 U/L    AST (SGOT) 17 1 - 32 U/L    Alkaline Phosphatase 282 (H) 39 - 117 U/L    Total Bilirubin 0.4 0.0 - 1.2 mg/dL    Globulin 4.2 gm/dL    A/G Ratio 0.8 g/dL    BUN/Creatinine Ratio 12.1 7.0 - 25.0    Anion Gap 16.0 (H) 5.0 - 15.0 mmol/L    eGFR 6.1 (L) >60.0 mL/min/1.73   Urinalysis With Culture If Indicated - Urine, Catheter    Specimen: Urine, Catheter   Result Value Ref Range    Color, UA Yellow Yellow, Straw, Dark Yellow, Jennifer    Appearance, UA Turbid (A) Clear    pH, UA 6.5 5.0 - 9.0    Specific [place], UA 1.015 1.003 - 1.030    Glucose, UA Negative Negative    Ketones, UA Negative Negative    Bilirubin, UA Negative Negative    Blood, UA Moderate (2+) (A) Negative    Protein, UA >=300 mg/dL (3+) (A) Negative    Leuk Esterase, UA Large (3+) (A) Negative    Nitrite, UA Negative Negative    Urobilinogen, UA 0.2 E.U./dL 0.2 - 1.0 E.U./dL   Urine Drug Screen - Urine, Clean Catch    Specimen: Urine, Clean Catch   Result Value Ref Range    THC, Screen, Urine Negative Negative    Phencyclidine (PCP), Urine Negative Negative    Cocaine Screen, Urine Negative Negative    Methamphetamine, Ur Negative Negative    Opiate Screen Negative Negative    Amphetamine Screen, Urine Negative Negative    Benzodiazepine Screen, Urine Negative Negative    Tricyclic Antidepressants Screen Negative Negative    Methadone Screen, Urine Negative Negative    Barbiturates Screen, Urine Negative Negative    Oxycodone Screen, Urine Positive (A) Negative    Propoxyphene Screen Negative Negative    Buprenorphine, Screen, Urine Negative Negative   High Sensitivity Troponin T    Specimen: Blood   Result Value Ref Range    HS Troponin T 56 (C) <10 ng/L   Magnesium    Specimen: Blood   Result Value Ref Range    Magnesium 2.1 1.6 - 2.4 mg/dL   Lactic Acid, Plasma    Specimen: Blood   Result Value Ref Range    Lactate 0.8 0.5 -  2.0 mmol/L   Procalcitonin    Specimen: Blood   Result Value Ref Range    Procalcitonin 0.27 (H) 0.00 - 0.25 ng/mL   CBC Auto Differential    Specimen: Blood   Result Value Ref Range    WBC 6.21 3.40 - 10.80 10*3/mm3    RBC 3.03 (L) 3.77 - 5.28 10*6/mm3    Hemoglobin 8.7 (L) 12.0 - 15.9 g/dL    Hematocrit 28.7 (L) 34.0 - 46.6 %    MCV 94.7 79.0 - 97.0 fL    MCH 28.7 26.6 - 33.0 pg    MCHC 30.3 (L) 31.5 - 35.7 g/dL    RDW 15.4 12.3 - 15.4 %    RDW-SD 53.5 37.0 - 54.0 fl    MPV 8.4 6.0 - 12.0 fL    Platelets 187 140 - 450 10*3/mm3    Neutrophil % 53.8 42.7 - 76.0 %    Lymphocyte % 32.2 19.6 - 45.3 %    Monocyte % 7.9 5.0 - 12.0 %    Eosinophil % 4.8 0.3 - 6.2 %    Basophil % 1.0 0.0 - 1.5 %    Immature Grans % 0.3 0.0 - 0.5 %    Neutrophils, Absolute 3.34 1.70 - 7.00 10*3/mm3    Lymphocytes, Absolute 2.00 0.70 - 3.10 10*3/mm3    Monocytes, Absolute 0.49 0.10 - 0.90 10*3/mm3    Eosinophils, Absolute 0.30 0.00 - 0.40 10*3/mm3    Basophils, Absolute 0.06 0.00 - 0.20 10*3/mm3    Immature Grans, Absolute 0.02 0.00 - 0.05 10*3/mm3    nRBC 0.0 0.0 - 0.2 /100 WBC   Fentanyl, Urine - Urine, Clean Catch    Specimen: Urine, Clean Catch   Result Value Ref Range    Fentanyl, Urine Negative Negative   Urinalysis, Microscopic Only - Urine, Catheter    Specimen: Urine, Catheter   Result Value Ref Range    RBC, UA 0-2 (A) None Seen /HPF    WBC, UA Too Numerous to Count (A) None Seen, 0-2, 3-5 /HPF    Bacteria, UA None Seen None Seen /HPF    Squamous Epithelial Cells, UA 0-2 None Seen, 0-2 /HPF    Hyaline Casts, UA None Seen None Seen /LPF    Methodology Automated Microscopy    ECG 12 Lead Altered Mental Status   Result Value Ref Range    QT Interval 446 ms    QTC Interval 463 ms   Green Top (Gel)   Result Value Ref Range    Extra Tube Hold for add-ons.    Lavender Top   Result Value Ref Range    Extra Tube hold for add-on    Light Blue Top   Result Value Ref Range    Extra Tube Hold for add-ons.      *Note: Due to a large number of  results and/or encounters for the requested time period, some results have not been displayed. A complete set of results can be found in Results Review.       XR Chest 1 View    Result Date: 5/11/2023  Narrative: FINDINGS: Right-sided PermCath is in place. Patient post median sternotomy. The heart is enlarged. There is mild central vascular prominence. There are low lung volumes. No focal pulmonary consolidations or pleural effusions.            ED Course  ED Course as of 05/11/23 1217   Thu May 11, 2023   1147 Spoke with Dr. Ledezma. Agrees to admission. Advised to hold on treatment for elevated K at this time as EKG does not show acute findings at this time. May go ahead with 1 GM of Rocephin for UTI. Dr. Lauren paged.  []   3605 Dr. Perez aware patient being admitted and states he will set up dialysis today.  []      ED Course User Index  [] Irasema Hall, BRIDGETT                                           Medical Decision Making  Patient presents to the ER with c/o AMS and missing dialysis. Work up shows patient is fluid overloaded along with K of 6.5 and UTI. Patient was discussed with Dr. Ledezma who agrees to admission. Dr. Lauren also aware of patient being admitted.     Amount and/or Complexity of Data Reviewed  Labs: ordered.  Radiology: ordered.  ECG/medicine tests: ordered and independent interpretation performed.      Risk  Prescription drug management.          Final diagnoses:   ESRD (end stage renal disease) on dialysis   Hyperkalemia   Urinary tract infection in female       ED Disposition  ED Disposition     ED Disposition   Decision to Admit    Condition   --    Comment   Level of Care: Telemetry [5]   Diagnosis: ESRD (end stage renal disease) on dialysis [[id number]]   Admitting Physician: LUCIANO LEDEZMA [[id number]]   Attending Physician: LUCIANO LEDEZMA [[id number]]               No follow-up provider specified.       Medication List      No changes were made to your prescriptions during  this visit.          Jeanahemanth Villalpandoandreea Owen, BRIDGETT  05/11/23 1217      Electronically signed by Bladimir Morillo MD at 05/11/23 1244     Abel Schneider, RN at 05/11/23 1621          Nursing report ED to floor  Yamileth Slater  63 y.o.  female    HPI:   Chief Complaint   Patient presents with   • Dizziness   • Altered Mental Status   • Body Shakes       Admitting doctor:   Jef Ramires MD    Consulting provider(s):  Consults       Date and Time Order Name Status Description    5/11/2023 11:53 AM Nephrology - JOHN (on-call MD from group unless specified) Completed              Admitting diagnosis:   The primary encounter diagnosis was ESRD (end stage renal disease) on dialysis. Diagnoses of Hyperkalemia and Urinary tract infection in female were also pertinent to this visit.    Code status:   Current Code Status       Date Active Code Status Order ID Comments User Context       Prior            Allergies:   Adhesive tape, Nsaids, Latex, Other, and Wound dressing adhesive    Intake and Output    Intake/Output Summary (Last 24 hours) at 5/11/2023 1622  Last data filed at 5/11/2023 1340  Gross per 24 hour   Intake 100 ml   Output --   Net 100 ml       Weight:       05/11/23  1051   Weight: 111 kg (245 lb)       Most recent vitals:   Vitals:    05/11/23 1500 05/11/23 1515 05/11/23 1530 05/11/23 1600   BP: 128/56 108/49 97/59 140/58   BP Location: Left arm Left arm Left arm Right arm   Patient Position: Lying Lying Lying Lying   Pulse: 68 67 66 66   Resp: 20 20 16 18   Temp: 98.2 °F (36.8 °C)      TempSrc:       SpO2:       Weight:       Height:         Oxygen Therapy: RA    Active LDAs/IV Access:   Lines, Drains & Airways       Active LDAs       Name Placement date Placement time Site Days    Peripheral IV 05/11/23 1037 Left Antecubital 05/11/23  1037  Antecubital  less than 1    Hemodialysis Cath Double 03/25/23  1340  Internal Jugular  47                    Labs (abnormal labs have a star):   Labs Reviewed    COMPREHENSIVE METABOLIC PANEL - Abnormal; Notable for the following components:       Result Value    Glucose 60 (*)     BUN 85 (*)     Creatinine 7.00 (*)     Potassium 6.5 (*)     CO2 19.0 (*)     Alkaline Phosphatase 282 (*)     Anion Gap 16.0 (*)     eGFR 6.1 (*)     All other components within normal limits    Narrative:     GFR Normal >60  Chronic Kidney Disease <60  Kidney Failure <15     URINALYSIS W/ CULTURE IF INDICATED - Abnormal; Notable for the following components:    Appearance, UA Turbid (*)     Blood, UA Moderate (2+) (*)     Protein, UA >=300 mg/dL (3+) (*)     Leuk Esterase, UA Large (3+) (*)     All other components within normal limits    Narrative:     In absence of clinical symptoms, the presence of pyuria, bacteria, and/or nitrites on the urinalysis result does not correlate with infection.   URINE DRUG SCREEN - Abnormal; Notable for the following components:    Oxycodone Screen, Urine Positive (*)     All other components within normal limits    Narrative:     Cutoff For Drugs Screened:    Amphetamines               500 ng/ml  Barbiturates               200 ng/ml  Benzodiazepines            150 ng/ml  Cocaine                    150 ng/ml  Methadone                  200 ng/ml  Opiates                    100 ng/ml  Phencyclidine               25 ng/ml  THC                            50 ng/ml  Methamphetamine            500 ng/ml  Tricyclic Antidepressants  300 ng/ml  Oxycodone                  100 ng/ml  Propoxyphene               300 ng/ml  Buprenorphine               10 ng/ml    The normal value for all drugs tested is negative. This report includes unconfirmed screening results, with the cutoff values listed, to be used for medical treatment purposes only.  Unconfirmed results must not be used for non-medical purposes such as employment or legal testing.  Clinical consideration should be applied to any drug of abuse test, particularly when unconfirmed results are used.     TROPONIN -  "Abnormal; Notable for the following components:    HS Troponin T 56 (*)     All other components within normal limits    Narrative:     High Sensitive Troponin T Reference Range:  <10.0 ng/L- Negative Female for AMI  <15.0 ng/L- Negative Male for AMI  >=10 - Abnormal Female indicating possible myocardial injury.  >=15 - Abnormal Male indicating possible myocardial injury.   Clinicians would have to utilize clinical acumen, EKG, Troponin, and serial changes to determine if it is an Acute Myocardial Infarction or myocardial injury due to an underlying chronic condition.        PROCALCITONIN - Abnormal; Notable for the following components:    Procalcitonin 0.27 (*)     All other components within normal limits    Narrative:     As a Marker for Sepsis (Non-Neonates):    1. <0.5 ng/mL represents a low risk of severe sepsis and/or septic shock.  2. >2 ng/mL represents a high risk of severe sepsis and/or septic shock.    As a Marker for Lower Respiratory Tract Infections that require antibiotic therapy:    PCT on Admission    Antibiotic Therapy       6-12 Hrs later    >0.5                Strongly Recommended  >0.25 - <0.5        Recommended   0.1 - 0.25          Discouraged              Remeasure/reassess PCT  <0.1                Strongly Discouraged     Remeasure/reassess PCT    As 28 day mortality risk marker: \"Change in Procalcitonin Result\" (>80% or <=80%) if Day 0 (or Day 1) and Day 4 values are available. Refer to http://www.[employer]-pct-calculator.com    Change in PCT <=80%  A decrease of PCT levels below or equal to 80% defines a positive change in PCT test result representing a higher risk for 28-day all-cause mortality of patients diagnosed with severe sepsis for septic shock.    Change in PCT >80%  A decrease of PCT levels of more than 80% defines a negative change in PCT result representing a lower risk for 28-day all-cause mortality of patients diagnosed with severe sepsis or septic shock.      CBC WITH AUTO " DIFFERENTIAL - Abnormal; Notable for the following components:    RBC 3.03 (*)     Hemoglobin 8.7 (*)     Hematocrit 28.7 (*)     MCHC 30.3 (*)     All other components within normal limits   URINALYSIS, MICROSCOPIC ONLY - Abnormal; Notable for the following components:    RBC, UA 0-2 (*)     WBC, UA Too Numerous to Count (*)     All other components within normal limits   HIGH SENSITIVITIY TROPONIN T 2HR - Abnormal; Notable for the following components:    HS Troponin T 54 (*)     All other components within normal limits    Narrative:     High Sensitive Troponin T Reference Range:  <10.0 ng/L- Negative Female for AMI  <15.0 ng/L- Negative Male for AMI  >=10 - Abnormal Female indicating possible myocardial injury.  >=15 - Abnormal Male indicating possible myocardial injury.   Clinicians would have to utilize clinical acumen, EKG, Troponin, and serial changes to determine if it is an Acute Myocardial Infarction or myocardial injury due to an underlying chronic condition.        MAGNESIUM - Normal   LACTIC ACID, PLASMA - Normal   FENTANYL, URINE - Normal    Narrative:     Negative Threshold:      Fentanyl 5 ng/mL     The normal value for the drug tested is negative. This report includes final unconfirmed screening results to be used for medical treatment purposes only. Unconfirmed results must not be used for non-medical purposes such as employment or legal testing. Clinical consideration should be applied to any drug of abuse test, particularly when unconfirmed results are used.          HEPATITIS B SURFACE ANTIGEN - Normal   URINE CULTURE   RAINBOW DRAW    Narrative:     The following orders were created for panel order [place] Draw.  Procedure                               Abnormality         Status                     ---------                               -----------         ------                     Green Top (Gel)[[id number]]                                  Final result               Lavender Top[[id number]]                                      Final result               Gold Top - SST[[id number]]                                                              Light Blue Top[[id number]]                                   Final result                 Please view results for these tests on the individual orders.   POCT GLUCOSE FINGERSTICK   CBC AND DIFFERENTIAL    Narrative:     The following orders were created for panel order CBC & Differential.  Procedure                               Abnormality         Status                     ---------                               -----------         ------                     CBC Auto Differential[[id number]]        Abnormal            Final result                 Please view results for these tests on the individual orders.   GREEN TOP   LAVENDER TOP   LIGHT BLUE TOP       Meds given in ED:   Medications   sodium chloride 0.9 % flush 10 mL (has no administration in time range)   heparin (porcine) injection 2,000 Units (4,000 Units Intracatheter Given 5/11/23 1557)   albumin human 25 % IV SOLN 12.5 g (has no administration in time range)   cefTRIAXone (ROCEPHIN) 1 g/100 mL 0.9% NS (MBP) (0 g Intravenous Stopped 5/11/23 1340)           NIH Stroke Scale:       Isolation/Infection(s):  No active isolations   CRE     COVID Testing  Collected NA  Resulted NA    Nursing report ED to floor:  Mental status: A&O to person.  Ambulatory status: Wheelchair  Precautions: NA    ED nurse phone [id number]- [phone]    Electronically signed by Abel Schneider RN at 05/11/23 1623         Lab Results (last 24 hours)     Procedure Component Value Units Date/Time    POC Glucose Once [[id number]]  (Abnormal) Collected: 05/12/23 1043    Specimen: Blood Updated: 05/12/23 1141     Glucose 223 mg/dL      Comment: RN NotifiedOperator: [id number] [facility] ID: [id number]       Urine Culture - Urine, Urine, Catheter [[id number]]  (Abnormal) Collected: 05/11/23 1050    Specimen: Urine, Catheter Updated: 05/12/23  1135     Urine Culture >100,000 CFU/mL Escherichia coli    Narrative:      Colonization of the urinary tract without infection is common. Treatment is discouraged unless the patient is symptomatic, pregnant, or undergoing an invasive urologic procedure.    Basic Metabolic Panel [[id number]]  (Abnormal) Collected: 05/12/23 0734    Specimen: Blood Updated: 05/12/23 0831     Glucose 86 mg/dL      BUN 48 mg/dL      Creatinine 4.52 mg/dL      Sodium 134 mmol/L      Potassium 4.9 mmol/L      Chloride 96 mmol/L      CO2 24.0 mmol/L      Calcium 9.7 mg/dL      BUN/Creatinine Ratio 10.6     Anion Gap 14.0 mmol/L      eGFR 10.3 mL/min/1.73      Comment: <15 Indicative of kidney failure       Narrative:      GFR Normal >60  Chronic Kidney Disease <60  Kidney Failure <15      CBC & Differential [[id number]]  (Abnormal) Collected: 05/12/23 0734    Specimen: Blood Updated: 05/12/23 0810    Narrative:      The following orders were created for panel order CBC & Differential.  Procedure                               Abnormality         Status                     ---------                               -----------         ------                     CBC Auto Differential[[id number]]        Abnormal            Final result                 Please view results for these tests on the individual orders.    CBC Auto Differential [[id number]]  (Abnormal) Collected: 05/12/23 0734    Specimen: Blood Updated: 05/12/23 0810     WBC 6.04 10*3/mm3      RBC 3.56 10*6/mm3      Hemoglobin 10.3 g/dL      Hematocrit 32.7 %      MCV 91.9 fL      MCH 28.9 pg      MCHC 31.5 g/dL      RDW 14.9 %      RDW-SD 50.5 fl      MPV 8.7 fL      Platelets 233 10*3/mm3      Neutrophil % 54.7 %      Lymphocyte % 29.1 %      Monocyte % 8.9 %      Eosinophil % 5.5 %      Basophil % 1.3 %      Immature Grans % 0.5 %      Neutrophils, Absolute 3.30 10*3/mm3      Lymphocytes, Absolute 1.76 10*3/mm3      Monocytes, Absolute 0.54 10*3/mm3      Eosinophils, Absolute 0.33  10*3/mm3      Basophils, Absolute 0.08 10*3/mm3      Immature Grans, Absolute 0.03 10*3/mm3      nRBC 0.0 /100 WBC     POC Glucose Once [[id number]]  (Normal) Collected: 05/12/23 0624    Specimen: Blood Updated: 05/12/23 0651     Glucose 88 mg/dL      Comment: : [id number] PATRICIA POLLOCKMeter ID: [id number]       POC Glucose Once [[id number]]  (Normal) Collected: 05/11/23 1917    Specimen: Blood Updated: 05/12/23 0627     Glucose 71 mg/dL      Comment: : [phone] PATRICIA POLLOCKMeter ID: [id number]       CRE Screen by PCR - Swab, Large Intestine, Rectum [[id number]] Collected: 05/11/23 1958    Specimen: Swab from Large Intestine, Rectum Updated: 05/11/23 2125     CRE SCREEN Not Detected     Comment: Test performed by real-time polymerase chain reaction (qPCR).        OXA 48 Strain Not Detected     IMP STRAIN Not Detected     VIM STRAIN Not Detected     NDM Strain Not Detected     KPC Strain Not Detected        Imaging Results (Last 24 Hours)     ** No results found for the last 24 hours. **        ECG/EMG Results (last 24 hours)     Procedure Component Value Units Date/Time    SCANNED EKG [[id number]] Resulted: 05/11/23     Updated: 05/11/23 2034    ECG 12 Lead Rhythm Change [[id number]] Collected: 05/12/23 0833     Updated: 05/12/23 0929     QT Interval 446 ms      QTC Interval 491 ms     Narrative:      Test Reason : Rhythm Change  Blood Pressure :   */*   mmHG  Vent. Rate :  73 BPM     Atrial Rate :  73 BPM     P-R Int : 200 ms          QRS Dur : 124 ms      QT Int : 446 ms       P-R-T Axes :  64 -21  50 degrees     QTc Int : 491 ms    Normal sinus rhythm with sinus arrhythmia  Cannot rule out Anterior infarct , age undetermined  Abnormal ECG  When compared with ECG of 11-MAY-2023 10:38,  QRS axis Shifted right    Referred By:            Confirmed By:     SCANNED - TELEMETRY   [[id number]] Resulted: 05/11/23     Updated: 05/12/23 1007    SCANNED - TELEMETRY   [[id number]] Resulted: 05/11/23      Updated: 05/12/23 1013             Physician Progress Notes (last 24 hours)      Jef Ramires MD at 05/12/23 1235              [facility] Medicine Services  INPATIENT PROGRESS NOTE    Length of Stay: 0  Date of Admission: 5/11/2023  Primary Care Physician: Kiersten Wilson APRN    Subjective   Chief Complaint: Confusion  HPI: Patient states she is feeling better.  Feels less confused.  Does have back and neck pain.    As of today, 05/12/23  Review of Systems   Constitutional: Negative for appetite change, chills, fatigue, fever and unexpected weight change.   Respiratory: Negative for cough, choking, chest tightness, shortness of breath and wheezing.    Cardiovascular: Negative for chest pain, palpitations and leg swelling.   Gastrointestinal: Negative for abdominal pain, blood in stool, constipation, diarrhea, nausea and vomiting.   Genitourinary: Negative for dysuria, flank pain and hematuria.   Musculoskeletal: Positive for back pain and neck pain.   Neurological: Negative for dizziness, seizures, syncope, speech difficulty, weakness, light-headedness, numbness and headaches.   Hematological: Does not bruise/bleed easily.        All pertinent negatives and positives are as above. All other systems have been reviewed and are negative unless otherwise stated.    Objective    Temp:  [97 °F (36.1 °C)-98.2 °F (36.8 °C)] 97.4 °F (36.3 °C)  Heart Rate:  [] 67  Resp:  [16-20] 18  BP: ()/(48-88) 130/68    AM-PAC 6 Clicks Score (PT): 12 (05/12/23 0825)    As of today, 05/12/23  Physical Exam  Vitals reviewed.   Constitutional:       Appearance: She is well-developed.   HENT:      Head: Normocephalic and atraumatic.   Eyes:      Pupils: Pupils are equal, round, and reactive to light.   Cardiovascular:      Rate and Rhythm: Normal rate and regular rhythm.      Heart sounds: Normal heart sounds. No murmur heard.    No friction rub. No gallop.    Pulmonary:      Effort: Pulmonary effort is normal. No respiratory distress.      Breath sounds: Normal breath sounds. No wheezing or rales.   Chest:      Chest wall: No tenderness.   Abdominal:      General: Bowel sounds are normal. There is no distension.      Palpations: Abdomen is soft.      Tenderness: There is no abdominal tenderness. There is no guarding.   Musculoskeletal:      Cervical back: Normal range of motion and neck supple.      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   Neurological:      Comments: More oriented today.  She knows person, place, season, and president.  She could not name the month.   Psychiatric:         Behavior: Behavior normal.         Thought Content: Thought content normal.           Results Review:  I have reviewed the labs, radiology results, and diagnostic studies.    Laboratory Data:   Results from last 7 days   Lab Units 05/12/23  0734 05/11/23  1038   SODIUM mmol/L 134* 139   POTASSIUM mmol/L 4.9 6.5*   CHLORIDE mmol/L 96* 104   CO2 mmol/L 24.0 19.0*   BUN mg/dL 48* 85*   CREATININE mg/dL 4.52* 7.00*   GLUCOSE mg/dL 86 60*   CALCIUM mg/dL 9.7 9.4   BILIRUBIN mg/dL  --  0.4   ALK PHOS U/L  --  282*   ALT (SGPT) U/L  --  13   AST (SGOT) U/L  --  17   ANION GAP mmol/L 14.0 16.0*     Estimated Creatinine Clearance: 14.8 mL/min (A) (by C-G formula based on SCr of 4.52 mg/dL (H)).  Results from last 7 days   Lab Units 05/11/23  1038   MAGNESIUM mg/dL 2.1         Results from last 7 days   Lab Units 05/12/23  0734 05/11/23  1038   WBC 10*3/mm3 6.04 6.21   HEMOGLOBIN g/dL 10.3* 8.7*   HEMATOCRIT % 32.7* 28.7*   PLATELETS 10*3/mm3 233 187           Culture Data:   No results found for: BLOODCX  Urine Culture   Date Value Ref Range Status   05/11/2023 >100,000 CFU/mL Escherichia coli (A)  Preliminary     No results found for: RESPCX  No results found for: WOUNDCX  No results found for: STOOLCX  No components  found for: BODYFLD    Radiology Data:   Imaging Results (Last 24 Hours)     Procedure Component Value Units Date/Time    CT Head Without Contrast [[id number]] Collected: 05/11/23 1316     Updated: 05/11/23 1429    Narrative:      INDICATION:  Altered mental status.    COMPARISON:  05/25/2022.    TECHNIQUE:  Axial images were performed from the calvarium through the base of the skull  followed by 2D multiplanar reformats.    FINDINGS:  There is no mass, mass effect or midline shift.  The ventricles are midline and  symmetric.  There is no abnormal extra-axial fluid collection.  Mild small  vessel ischemic changes.   Pituitary and posterior fossa are unremarkable.  Calvarium is intact.      Impression:      No acute intracranial process.          I have utilized all available immediate resources to obtain, update, or review the patient's current medications (including all prescriptions, over-the-counter products, herbals, cannabis/cannabidiol products, and vitamin/mineral/dietary (nutritional) supplements).       Assessment/Plan     Active Hospital Problems    Diagnosis    • **ESRD (end stage renal disease) on dialysis        Plan:    1.  Altered mental status: Most likely multifactorial.  Likely secondary to inflammation of urinary tract infection and uremia.  Better today.  Still some confusion.  2.  End-stage renal disease: Appreciate nephrology help.  Dialyzed yesterday.  3.  Hyperkalemia: Management with hemodialysis.  4.  Coronary artery disease: Continue home medication.  5.  Urinary tract infection: Continue empiric antibiotics.  Preliminary culture shows greater than 100,000 colony-forming units of E. coli.  6.  Diabetes mellitus: Long-acting insulin and sliding scale insulin.  7.  Morbid obesity  8.  DVT prophylaxis: Heparin.    Medical Decision Making  Number and Complexity of problems: 2 highly complex medical problems    Conditions and Status:        Condition is improving.     [facility] Data  External documents  "reviewed: Primary care and subspecialty notes  My EKG interpretation: Normal sinus rhythm  My plain film interpretation: No acute cardiopulmonary disease     Discussed with: Patient     Treatment Plan  As above    Care Planning  Shared decision making: Patient in agreement with plan of care  Code status and discussions: Full code    Disposition  Social Determinants of Health that impact treatment or disposition: None  I expect the patient to be discharged to skilled facility in 2-3 days.       The patient was evaluated during the global COVID-19 pandemic, and the diagnosis was suspected/considered upon their initial presentation.  Evaluation, treatment, and testing were consistent with current guidelines for patients who present with complaints or symptoms that may be related to COVID-19.    I confirmed that the patient's Advance Care Plan is present, code status is documented, or surrogate decision maker is listed in the patient's medical record.        This document has been electronically signed by Jef Ramires MD on May 12, 2023 12:35 CDT        Electronically signed by Jef Ramires MD at 23 1239     Ace Lauren MD at 23 1008            [facility]  [address] [place] [address]. [id number]  T - [phone]  F - [phone]     Progress Note          PATIENT  DEMOGRAPHICS   PATIENT NAME: Yamileth Slater                      PHYSICIAN: Ace Lauren MD  : 1960  MRN: [id number]   LOS: 0 days    Patient Care Team:  Kiersten iWlson APRN as PCP - General (Family Medicine)  Subjective    SUBJECTIVE   Feels some better. No n/v, no dizziness today          Objective    OBJECTIVE   Vital Signs  Temp:  [97 °F (36.1 °C)-98.2 °F (36.8 °C)] 97.6 °F (36.4 °C)  Heart Rate:  [] 75  Resp:  [16-20] 18  BP: ()/(48-88) 152/88    Flowsheet Rows    Flowsheet Row First Filed Value   Admission Height 157.5 cm (62\") Documented at 2023 1051   Admission " Weight 111 kg (245 lb) Documented at 05/11/2023 1051         I/O last 3 completed shifts:  In: 130 [P.O.:30; IV Piggyback:100]  Out: 4125 [Urine:125; Other:4000]    PHYSICAL EXAM    Physical Exam  Constitutional:       Appearance: She is well-developed.   HENT:      Head: Normocephalic.   Eyes:      Pupils: Pupils are equal, round, and reactive to light.   Cardiovascular:      Rate and Rhythm: Normal rate and regular rhythm.      Heart sounds: Normal heart sounds.   Pulmonary:      Effort: Pulmonary effort is normal.      Breath sounds: Normal breath sounds.   Abdominal:      General: Bowel sounds are normal.      Palpations: Abdomen is soft.   Musculoskeletal:         General: No swelling.   Skin:     Coloration: Skin is not jaundiced.   Neurological:      General: No focal deficit present.      Mental Status: She is alert and oriented to person, place, and time.         RESULTS   Results Review:    Results from last 7 days   Lab Units 05/12/23  0734 05/11/23  1038   SODIUM mmol/L 134* 139   POTASSIUM mmol/L 4.9 6.5*   CHLORIDE mmol/L 96* 104   CO2 mmol/L 24.0 19.0*   BUN mg/dL 48* 85*   CREATININE mg/dL 4.52* 7.00*   CALCIUM mg/dL 9.7 9.4   BILIRUBIN mg/dL  --  0.4   ALK PHOS U/L  --  282*   ALT (SGPT) U/L  --  13   AST (SGOT) U/L  --  17   GLUCOSE mg/dL 86 60*       Estimated Creatinine Clearance: 14.8 mL/min (A) (by C-G formula based on SCr of 4.52 mg/dL (H)).    Results from last 7 days   Lab Units 05/11/23  1038   MAGNESIUM mg/dL 2.1             Results from last 7 days   Lab Units 05/12/23  0734 05/11/23  1038   WBC 10*3/mm3 6.04 6.21   HEMOGLOBIN g/dL 10.3* 8.7*   PLATELETS 10*3/mm3 233 187               Imaging Results (Last 24 Hours)     Procedure Component Value Units Date/Time    CT Head Without Contrast [[id number]] Collected: 05/11/23 1316     Updated: 05/11/23 1429    Narrative:      INDICATION:  Altered mental status.    COMPARISON:  05/25/2022.    TECHNIQUE:  Axial images were performed from the  calvarium through the base of the skull  followed by 2D multiplanar reformats.    FINDINGS:  There is no mass, mass effect or midline shift.  The ventricles are midline and  symmetric.  There is no abnormal extra-axial fluid collection.  Mild small  vessel ischemic changes.   Pituitary and posterior fossa are unremarkable.  Calvarium is intact.      Impression:      No acute intracranial process.    XR Chest 1 View [[id number]] Collected: 05/11/23 1047     Updated: 05/11/23 1100    Narrative:      FINDINGS:  Right-sided PermCath is in place. Patient post median sternotomy. The heart is  enlarged. There is mild central vascular prominence. There are low lung volumes.  No focal pulmonary consolidations or pleural effusions.         MEDICATIONS    atorvastatin, 40 mg, Oral, Daily  carvedilol, 6.25 mg, Oral, BID With Meals  cefTRIAXone, 1 g, Intravenous, Q24H  cetirizine, 5 mg, Oral, Daily  clopidogrel, 75 mg, Oral, Daily  cyclobenzaprine, 5 mg, Oral, BID  docusate sodium, 100 mg, Oral, BID  DULoxetine, 20 mg, Oral, Daily  furosemide, 20 mg, Oral, Daily  heparin (porcine), 5,000 Units, Subcutaneous, Q12H  Insulin Aspart, 0-9 Units, Subcutaneous, TID AC  insulin aspart, 14 Units, Subcutaneous, TID With Meals  insulin detemir, 24 Units, Subcutaneous, Nightly  losartan, 25 mg, Oral, Daily  memantine, 5 mg, Oral, BID  oxyCODONE, 10 mg, Oral, TID  pantoprazole, 40 mg, Oral, Nightly  sevelamer, 800 mg, Oral, TID With Meals  sodium chloride, 10 mL, Intravenous, Q12H           Assessment & Plan   ASSESSMENT / PLAN      ESRD (end stage renal disease) on dialysis    1.  ESRD on hemodialysis- normally dialyzes TTS at [facility] in [place].  She missed her dialysis on Tuesday. Had hd on Thursday. Next hd tomorrow. Using avf or tunnel cath these days.    . Will run 1K for first part of the treatment UF goal is 3-4 L. Plan to use AVF on right tomorrow if possible     2.  Hyperkalemia- now better     3.  Anemia of CKD hgb  "stable     4.  CAD     5.  DM2     6.  CKD-MBD     7.  Hypertension     8. uti on rocephin                This document has been electronically signed by Ace Lauren MD on May 12, 2023 10:08 CDT           Electronically signed by Ace Lauren MD at 05/12/23 1011     Nestor Richards MD at 05/11/23 3898     Summary:Cross cover note               Patient restless and attempting to pull out IV overnight.  We will attempt to ameliorate with Haldol 3 mg IM x1    Patient Vitals for the past 24 hrs:   BP Temp Temp src Pulse Resp SpO2 Height Weight   05/11/23 1929 -- -- -- 71 -- -- -- --   05/11/23 1914 118/56 97.1 °F (36.2 °C) Temporal 79 18 100 % -- 112 kg (247 lb 4.8 oz)   05/11/23 1900 150/72 98 °F (36.7 °C) Tympanic 72 18 -- -- --   05/11/23 1849 152/70 -- -- 74 20 -- -- --   05/11/23 1830 150/72 -- -- 74 20 -- -- --   05/11/23 1800 122/48 -- -- 73 18 -- -- --   05/11/23 1730 133/86 -- -- 75 18 -- -- --   05/11/23 1700 142/60 -- -- 68 18 -- -- --   05/11/23 1630 137/60 -- -- 70 20 -- -- --   05/11/23 1600 140/58 -- -- 66 18 -- -- --   05/11/23 1530 97/59 -- -- 66 16 -- -- --   05/11/23 1515 108/49 -- -- 67 20 -- -- --   05/11/23 1500 128/56 98.2 °F (36.8 °C) -- 68 20 -- -- --   05/11/23 1138 -- 97.6 °F (36.4 °C) Oral -- -- -- -- --   05/11/23 1051 -- -- -- -- -- -- 157.5 cm (62\") 111 kg (245 lb)   05/11/23 1040 146/62 -- -- 66 -- 90 % -- --   05/11/23 0959 113/55 -- -- 67 18 91 % -- --     BMP        3/15/2023    14:21 3/16/2023    06:51 5/11/2023    10:38   BMP   BUN 74   55   85     Creatinine 4.80   4.14   7.00     Sodium 135   132   139     Potassium 4.0   4.3   6.5     Chloride 100   93   104     CO2 22.0   19.0   19.0     Calcium 10.0   9.7   9.4        CBC        3/15/2023    14:21 3/16/2023    06:51 5/11/2023    10:38   CBC   WBC 9.95   10.22   6.21     RBC 3.62   4.00   3.03     Hemoglobin 10.3   11.4   8.7     Hematocrit 32.7   36.7   28.7     MCV 90.3   91.8   94.7     MCH 28.5   28.5   28.7     MCHC 31.5  "  31.1   30.3     RDW 16.1   16.2   15.4     Platelets 311   278   187         Electronically signed by Nestor Richards MD at 05/11/23 5670       Medical Student Notes (last 24 hours)  Notes from 05/11/23 1443 through 05/12/23 1443   No notes of this type exist for this encounter.         Consult Notes (last 24 hours)  Notes from 05/11/23 1443 through 05/12/23 1443   No notes of this type exist for this encounter.

## 2023-05-13 LAB
ANION GAP SERPL CALCULATED.3IONS-SCNC: 13 MMOL/L (ref 5–15)
BACTERIA SPEC AEROBE CULT: ABNORMAL
BASOPHILS # BLD AUTO: 0.08 10*3/MM3 (ref 0–0.2)
BASOPHILS NFR BLD AUTO: 1.2 % (ref 0–1.5)
BUN SERPL-MCNC: 62 MG/DL (ref 8–23)
BUN/CREAT SERPL: 10.7 (ref 7–25)
CALCIUM SPEC-SCNC: 9.8 MG/DL (ref 8.6–10.5)
CHLORIDE SERPL-SCNC: 99 MMOL/L (ref 98–107)
CO2 SERPL-SCNC: 23 MMOL/L (ref 22–29)
CREAT SERPL-MCNC: 5.78 MG/DL (ref 0.57–1)
DEPRECATED RDW RBC AUTO: 48.1 FL (ref 37–54)
EGFRCR SERPLBLD CKD-EPI 2021: 7.7 ML/MIN/1.73
EOSINOPHIL # BLD AUTO: 0.33 10*3/MM3 (ref 0–0.4)
EOSINOPHIL NFR BLD AUTO: 5 % (ref 0.3–6.2)
ERYTHROCYTE [DISTWIDTH] IN BLOOD BY AUTOMATED COUNT: 14.6 % (ref 12.3–15.4)
GLUCOSE BLDC GLUCOMTR-MCNC: 112 MG/DL (ref 70–130)
GLUCOSE BLDC GLUCOMTR-MCNC: 121 MG/DL (ref 70–130)
GLUCOSE BLDC GLUCOMTR-MCNC: 131 MG/DL (ref 70–130)
GLUCOSE BLDC GLUCOMTR-MCNC: 202 MG/DL (ref 70–130)
GLUCOSE SERPL-MCNC: 121 MG/DL (ref 65–99)
HCT VFR BLD AUTO: 30.1 % (ref 34–46.6)
HGB BLD-MCNC: 9.7 G/DL (ref 12–15.9)
IMM GRANULOCYTES # BLD AUTO: 0.03 10*3/MM3 (ref 0–0.05)
IMM GRANULOCYTES NFR BLD AUTO: 0.5 % (ref 0–0.5)
LYMPHOCYTES # BLD AUTO: 2.3 10*3/MM3 (ref 0.7–3.1)
LYMPHOCYTES NFR BLD AUTO: 34.9 % (ref 19.6–45.3)
MCH RBC QN AUTO: 29.1 PG (ref 26.6–33)
MCHC RBC AUTO-ENTMCNC: 32.2 G/DL (ref 31.5–35.7)
MCV RBC AUTO: 90.4 FL (ref 79–97)
MONOCYTES # BLD AUTO: 0.65 10*3/MM3 (ref 0.1–0.9)
MONOCYTES NFR BLD AUTO: 9.9 % (ref 5–12)
NEUTROPHILS NFR BLD AUTO: 3.2 10*3/MM3 (ref 1.7–7)
NEUTROPHILS NFR BLD AUTO: 48.5 % (ref 42.7–76)
NRBC BLD AUTO-RTO: 0 /100 WBC (ref 0–0.2)
PLATELET # BLD AUTO: 208 10*3/MM3 (ref 140–450)
PMV BLD AUTO: 8.6 FL (ref 6–12)
POTASSIUM SERPL-SCNC: 5.2 MMOL/L (ref 3.5–5.2)
RBC # BLD AUTO: 3.33 10*6/MM3 (ref 3.77–5.28)
SODIUM SERPL-SCNC: 135 MMOL/L (ref 136–145)
WBC NRBC COR # BLD: 6.59 10*3/MM3 (ref 3.4–10.8)

## 2023-05-13 PROCEDURE — 96372 THER/PROPH/DIAG INJ SC/IM: CPT

## 2023-05-13 PROCEDURE — 63710000001 PROMETHAZINE PER 25 MG: Performed by: HOSPITALIST

## 2023-05-13 PROCEDURE — 25010000002 HEPARIN (PORCINE) PER 1000 UNITS: Performed by: INTERNAL MEDICINE

## 2023-05-13 PROCEDURE — 25010000002 CEFTRIAXONE PER 250 MG: Performed by: HOSPITALIST

## 2023-05-13 PROCEDURE — 63710000001 INSULIN ASPART PER 5 UNITS: Performed by: HOSPITALIST

## 2023-05-13 PROCEDURE — 80048 BASIC METABOLIC PNL TOTAL CA: CPT | Performed by: HOSPITALIST

## 2023-05-13 PROCEDURE — 82948 REAGENT STRIP/BLOOD GLUCOSE: CPT

## 2023-05-13 PROCEDURE — 25010000002 HEPARIN (PORCINE) PER 1000 UNITS: Performed by: HOSPITALIST

## 2023-05-13 PROCEDURE — 25010000002 MEROPENEM PER 100 MG: Performed by: HOSPITALIST

## 2023-05-13 PROCEDURE — G0257 UNSCHED DIALYSIS ESRD PT HOS: HCPCS

## 2023-05-13 PROCEDURE — 85025 COMPLETE CBC W/AUTO DIFF WBC: CPT | Performed by: HOSPITALIST

## 2023-05-13 RX ORDER — HEPARIN SODIUM 1000 [USP'U]/ML
4000 INJECTION, SOLUTION INTRAVENOUS; SUBCUTANEOUS AS NEEDED
Status: DISCONTINUED | OUTPATIENT
Start: 2023-05-13 | End: 2023-05-16 | Stop reason: SDUPTHER

## 2023-05-13 RX ORDER — LIDOCAINE AND PRILOCAINE 25; 25 MG/G; MG/G
CREAM TOPICAL AS NEEDED
Status: DISCONTINUED | OUTPATIENT
Start: 2023-05-13 | End: 2023-05-16 | Stop reason: SDUPTHER

## 2023-05-13 RX ORDER — ALBUMIN (HUMAN) 12.5 G/50ML
12.5 SOLUTION INTRAVENOUS AS NEEDED
Status: ACTIVE | OUTPATIENT
Start: 2023-05-13 | End: 2023-05-13

## 2023-05-13 RX ADMIN — DOCUSATE SODIUM 100 MG: 100 CAPSULE, LIQUID FILLED ORAL at 08:22

## 2023-05-13 RX ADMIN — INSULIN ASPART 14 UNITS: 100 INJECTION, SOLUTION INTRAVENOUS; SUBCUTANEOUS at 08:25

## 2023-05-13 RX ADMIN — SEVELAMER CARBONATE 800 MG: 800 TABLET, FILM COATED ORAL at 11:13

## 2023-05-13 RX ADMIN — OXYCODONE HYDROCHLORIDE 10 MG: 5 TABLET ORAL at 21:20

## 2023-05-13 RX ADMIN — Medication 10 ML: at 21:20

## 2023-05-13 RX ADMIN — INSULIN ASPART 4 UNITS: 100 INJECTION, SOLUTION INTRAVENOUS; SUBCUTANEOUS at 11:12

## 2023-05-13 RX ADMIN — MEMANTINE 5 MG: 5 TABLET ORAL at 21:20

## 2023-05-13 RX ADMIN — DOCUSATE SODIUM 100 MG: 100 CAPSULE, LIQUID FILLED ORAL at 21:20

## 2023-05-13 RX ADMIN — PANTOPRAZOLE SODIUM 40 MG: 40 TABLET, DELAYED RELEASE ORAL at 21:20

## 2023-05-13 RX ADMIN — DULOXETINE HYDROCHLORIDE 20 MG: 20 CAPSULE, DELAYED RELEASE ORAL at 08:22

## 2023-05-13 RX ADMIN — CETIRIZINE HYDROCHLORIDE 5 MG: 5 TABLET ORAL at 08:22

## 2023-05-13 RX ADMIN — INSULIN ASPART 14 UNITS: 100 INJECTION, SOLUTION INTRAVENOUS; SUBCUTANEOUS at 17:47

## 2023-05-13 RX ADMIN — OXYCODONE HYDROCHLORIDE 10 MG: 5 TABLET ORAL at 17:48

## 2023-05-13 RX ADMIN — HEPARIN SODIUM 4000 UNITS: 1000 INJECTION INTRAVENOUS; SUBCUTANEOUS at 13:35

## 2023-05-13 RX ADMIN — MEMANTINE 5 MG: 5 TABLET ORAL at 08:23

## 2023-05-13 RX ADMIN — HEPARIN SODIUM 5000 UNITS: 5000 INJECTION INTRAVENOUS; SUBCUTANEOUS at 21:20

## 2023-05-13 RX ADMIN — ATORVASTATIN CALCIUM 40 MG: 40 TABLET, FILM COATED ORAL at 08:23

## 2023-05-13 RX ADMIN — CARVEDILOL 6.25 MG: 6.25 TABLET, FILM COATED ORAL at 17:48

## 2023-05-13 RX ADMIN — CYCLOBENZAPRINE 5 MG: 5 TABLET, FILM COATED ORAL at 21:20

## 2023-05-13 RX ADMIN — INSULIN ASPART 14 UNITS: 100 INJECTION, SOLUTION INTRAVENOUS; SUBCUTANEOUS at 11:12

## 2023-05-13 RX ADMIN — OXYCODONE HYDROCHLORIDE 10 MG: 5 TABLET ORAL at 08:22

## 2023-05-13 RX ADMIN — PROMETHAZINE HYDROCHLORIDE 25 MG: 25 TABLET ORAL at 20:13

## 2023-05-13 RX ADMIN — CEFTRIAXONE SODIUM 1 G: 1 INJECTION, POWDER, FOR SOLUTION INTRAMUSCULAR; INTRAVENOUS at 08:25

## 2023-05-13 RX ADMIN — SEVELAMER CARBONATE 800 MG: 800 TABLET, FILM COATED ORAL at 17:48

## 2023-05-13 RX ADMIN — SEVELAMER CARBONATE 800 MG: 800 TABLET, FILM COATED ORAL at 08:22

## 2023-05-13 RX ADMIN — CYCLOBENZAPRINE 5 MG: 5 TABLET, FILM COATED ORAL at 08:22

## 2023-05-13 RX ADMIN — CLOPIDOGREL BISULFATE 75 MG: 75 TABLET ORAL at 08:23

## 2023-05-13 RX ADMIN — HEPARIN SODIUM 5000 UNITS: 5000 INJECTION INTRAVENOUS; SUBCUTANEOUS at 08:22

## 2023-05-13 RX ADMIN — LIDOCAINE AND PRILOCAINE: 25; 25 CREAM TOPICAL at 12:17

## 2023-05-13 RX ADMIN — MEROPENEM 500 MG: 500 INJECTION, POWDER, FOR SOLUTION INTRAVENOUS at 12:22

## 2023-05-13 RX ADMIN — Medication 10 ML: at 08:25

## 2023-05-13 NOTE — PROGRESS NOTES
[facility] Medicine Services  INPATIENT PROGRESS NOTE    Length of Stay: 1  Date of Admission: 5/11/2023  Primary Care Physician: Kiersten Wilson APRN    Subjective   Chief Complaint: Confusion  HPI: Patient states she is feeling better.  More oriented today.    As of today, 05/13/23  Review of Systems   Constitutional: Negative for appetite change, chills, fatigue, fever and unexpected weight change.   Respiratory: Negative for cough, choking, chest tightness, shortness of breath and wheezing.    Cardiovascular: Negative for chest pain, palpitations and leg swelling.   Gastrointestinal: Negative for abdominal pain, blood in stool, constipation, diarrhea, nausea and vomiting.   Genitourinary: Negative for dysuria, flank pain and hematuria.   Musculoskeletal: Positive for back pain and neck pain.   Neurological: Negative for dizziness, seizures, syncope, speech difficulty, weakness, light-headedness, numbness and headaches.   Hematological: Does not bruise/bleed easily.        All pertinent negatives and positives are as above. All other systems have been reviewed and are negative unless otherwise stated.    Objective    Temp:  [97.2 °F (36.2 °C)-98 °F (36.7 °C)] 97.9 °F (36.6 °C)  Heart Rate:  [66-85] 72  Resp:  [16-18] 16  BP: (125-160)/(58-70) 133/58    AM-PAC 6 Clicks Score (PT): 12 (05/13/23 0823)    As of today, 05/13/23  Physical Exam  Vitals reviewed.   Constitutional:       Appearance: She is well-developed.   HENT:      Head: Normocephalic and atraumatic.   Eyes:      Pupils: Pupils are equal, round, and reactive to light.   Cardiovascular:      Rate and Rhythm: Normal rate and regular rhythm.      Heart sounds: Normal heart sounds. No murmur heard.    No friction rub. No gallop.   Pulmonary:      Effort: Pulmonary effort is normal. No respiratory distress.      Breath sounds: Normal breath sounds. No wheezing or rales.   Chest:      Chest wall: No  tenderness.   Abdominal:      General: Bowel sounds are normal. There is no distension.      Palpations: Abdomen is soft.      Tenderness: There is no abdominal tenderness. There is no guarding.   Musculoskeletal:      Cervical back: Normal range of motion and neck supple.      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   Neurological:      Comments: More oriented today.     Psychiatric:         Behavior: Behavior normal.         Thought Content: Thought content normal.           Results Review:  I have reviewed the labs, radiology results, and diagnostic studies.    Laboratory Data:   Results from last 7 days   Lab Units 05/13/23  0616 05/12/23  0734 05/11/23  1038   SODIUM mmol/L 135* 134* 139   POTASSIUM mmol/L 5.2 4.9 6.5*   CHLORIDE mmol/L 99 96* 104   CO2 mmol/L 23.0 24.0 19.0*   BUN mg/dL 62* 48* 85*   CREATININE mg/dL 5.78* 4.52* 7.00*   GLUCOSE mg/dL 121* 86 60*   CALCIUM mg/dL 9.8 9.7 9.4   BILIRUBIN mg/dL  --   --  0.4   ALK PHOS U/L  --   --  282*   ALT (SGPT) U/L  --   --  13   AST (SGOT) U/L  --   --  17   ANION GAP mmol/L 13.0 14.0 16.0*     Estimated Creatinine Clearance: 11.6 mL/min (A) (by C-G formula based on SCr of 5.78 mg/dL (H)).  Results from last 7 days   Lab Units 05/11/23  1038   MAGNESIUM mg/dL 2.1         Results from last 7 days   Lab Units 05/13/23  0616 05/12/23  0734 05/11/23  1038   WBC 10*3/mm3 6.59 6.04 6.21   HEMOGLOBIN g/dL 9.7* 10.3* 8.7*   HEMATOCRIT % 30.1* 32.7* 28.7*   PLATELETS 10*3/mm3 208 233 187           Culture Data:   No results found for: BLOODCX  Urine Culture   Date Value Ref Range Status   05/11/2023 >100,000 CFU/mL Escherichia coli ESBL (A)  Final     Comment:       Consider infectious disease consult.  Susceptibility results may not correlate to clinical outcomes.     No results found for: RESPCX  No results found for: WOUNDCX  No results found for: STOOLCX  No components found for:  BODYFLD    Radiology Data:   Imaging Results (Last 24 Hours)     ** No results found for the last 24 hours. **          I have utilized all available immediate resources to obtain, update, or review the patient's current medications (including all prescriptions, over-the-counter products, herbals, cannabis/cannabidiol products, and vitamin/mineral/dietary (nutritional) supplements).       Assessment/Plan     Active Hospital Problems    Diagnosis    • **ESRD (end stage renal disease) on dialysis        Plan:    1.  Altered mental status: Most likely multifactorial.  Likely secondary to inflammation of urinary tract infection and uremia.  Better today.  Confusion clearing.    2.  End-stage renal disease: Appreciate nephrology help.  Plan to dialyze today.  3.  Hyperkalemia: Management with hemodialysis.  4.  Coronary artery disease: Continue home medication.  5.  Urinary tract infection: Urine culture shows ESBL E. coli.  Changed to meropenem.  6.  Diabetes mellitus: Long-acting insulin and sliding scale insulin.  7.  Morbid obesity  8.  DVT prophylaxis: Heparin.    Medical Decision Making  Number and Complexity of problems: 2 highly complex medical problems    Conditions and Status:        Condition is improving.     [facility] Data  External documents reviewed: Primary care and subspecialty notes  My EKG interpretation: Normal sinus rhythm  My plain film interpretation: No acute cardiopulmonary disease     Discussed with: Patient     Treatment Plan  As above    Care Planning  Shared decision making: Patient in agreement with plan of care  Code status and discussions: Full code    Disposition  Social Determinants of Health that impact treatment or disposition: None  I expect the patient to be discharged to skilled facility in 2-3 days.       The patient was evaluated during the global COVID-19 pandemic, and the diagnosis was suspected/considered upon their initial presentation.  Evaluation, treatment, and testing were  consistent with current guidelines for patients who present with complaints or symptoms that may be related to COVID-19.    I confirmed that the patient's Advance Care Plan is present, code status is documented, or surrogate decision maker is listed in the patient's medical record.        This document has been electronically signed by Jef Ramires MD on May 13, 2023 11:42 CDT

## 2023-05-13 NOTE — PLAN OF CARE
Problem: Device-Related Complication Risk (Hemodialysis)  Goal: Safe, Effective Therapy Delivery  Outcome: Ongoing, Progressing     Problem: Hemodynamic Instability (Hemodialysis)  Goal: Effective Tissue Perfusion  Outcome: Ongoing, Progressing     Problem: Infection (Hemodialysis)  Goal: Absence of Infection Signs and Symptoms  Outcome: Ongoing, Progressing     Problem: Fall Injury Risk  Goal: Absence of Fall and Fall-Related Injury  Outcome: Ongoing, Progressing  Intervention: Promote Injury-Free Environment  Recent Flowsheet Documentation  Taken 5/13/2023 0405 by July Cedeño RN  Safety Promotion/Fall Prevention: safety round/check completed  Taken 5/13/2023 0205 by July Cedeño RN  Safety Promotion/Fall Prevention: safety round/check completed  Taken 5/13/2023 0005 by July Cedeño RN  Safety Promotion/Fall Prevention: safety round/check completed  Taken 5/12/2023 2210 by July Cedeño RN  Safety Promotion/Fall Prevention: safety round/check completed  Taken 5/12/2023 2122 by July Cedeño RN  Safety Promotion/Fall Prevention: safety round/check completed  Taken 5/12/2023 2023 by July Cedeño RN  Safety Promotion/Fall Prevention: safety round/check completed  Taken 5/12/2023 1920 by July Cedeño RN  Safety Promotion/Fall Prevention: safety round/check completed     Problem: Skin Injury Risk Increased  Goal: Skin Health and Integrity  Outcome: Ongoing, Progressing  Intervention: Optimize Skin Protection  Recent Flowsheet Documentation  Taken 5/13/2023 0405 by July Cedeño RN  Head of Bed (HOB) Positioning: HOB elevated  Taken 5/13/2023 0205 by July Cedeño RN  Head of Bed (HOB) Positioning: HOB elevated  Taken 5/13/2023 0005 by July Cedeño RN  Head of Bed (HOB) Positioning: HOB elevated  Taken 5/12/2023 2210 by July Cedeño RN  Head of Bed (HOB) Positioning: HOB elevated  Taken 5/12/2023 2023 by July Cedeño RN  Head of Bed (HOB) Positioning: HOB elevated     Problem: Diabetes  Comorbidity  Goal: Blood Glucose Level Within Targeted Range  Outcome: Ongoing, Progressing  Intervention: Monitor and Manage Glycemia  Recent Flowsheet Documentation  Taken 5/12/2023 2023 by July Cedeño RN  Glycemic Management: blood glucose monitored     Problem: Heart Failure Comorbidity  Goal: Maintenance of Heart Failure Symptom Control  Outcome: Ongoing, Progressing     Problem: Hypertension Comorbidity  Goal: Blood Pressure in Desired Range  Outcome: Ongoing, Progressing     Problem: Obstructive Sleep Apnea Risk or Actual Comorbidity Management  Goal: Unobstructed Breathing During Sleep  Outcome: Ongoing, Progressing     Problem: Pain Chronic (Persistent) (Comorbidity Management)  Goal: Acceptable Pain Control and Functional Ability  Outcome: Ongoing, Progressing  Intervention: Develop Pain Management Plan  Recent Flowsheet Documentation  Taken 5/12/2023 2023 by July Cedeño, RN  Pain Management Interventions: see MAR  Intervention: Optimize Psychosocial Wellbeing  Recent Flowsheet Documentation  Taken 5/12/2023 2023 by July Cedeño, RN  Supportive Measures: active listening utilized   Goal Outcome Evaluation:

## 2023-05-13 NOTE — PLAN OF CARE
Problem: Device-Related Complication Risk (Hemodialysis)  Goal: Safe, Effective Therapy Delivery  Outcome: Ongoing, Progressing     Problem: Hemodynamic Instability (Hemodialysis)  Goal: Effective Tissue Perfusion  Outcome: Ongoing, Progressing     Problem: Infection (Hemodialysis)  Goal: Absence of Infection Signs and Symptoms  Outcome: Ongoing, Progressing   Goal Outcome Evaluation:      Hemodynamically stable during HD. Obtained ordered UF 3L without difficulty. Used AVF today with 17 gauge needles (only 2nd time cannulated). Needs reminding to leave access arm still.

## 2023-05-13 NOTE — PROGRESS NOTES
"  [facility]  [address] [place] [address]. [address]  T - [phone]  F - [phone]     Progress Note          PATIENT  DEMOGRAPHICS   PATIENT NAME: Yamileth Slater                      PHYSICIAN: Tee Jacobo MD  : 1960  MRN: [id number]   LOS: 1 day    Patient Care Team:  Kiersten Wilson APRN as PCP - General (Family Medicine)  Subjective   SUBJECTIVE     Congnition is much better, she reports no complains          Objective   OBJECTIVE   Vital Signs  Temp:  [97.2 °F (36.2 °C)-98 °F (36.7 °C)] 98 °F (36.7 °C)  Heart Rate:  [66-85] 69  Resp:  [16-18] 16  BP: (125-160)/(58-70) 137/63    Flowsheet Rows    Flowsheet Row First Filed Value   Admission Height 157.5 cm (62\") Documented at 2023 1051   Admission Weight 111 kg (245 lb) Documented at 2023 1051           I/O last 3 completed shifts:  In: 1330 [P.O.:1330]  Out: 5125 [Urine:1125; Other:4000]    PHYSICAL EXAM    Physical Exam  Constitutional:       Appearance: She is well-developed.   HENT:      Head: Normocephalic.   Eyes:      Pupils: Pupils are equal, round, and reactive to light.   Cardiovascular:      Rate and Rhythm: Normal rate and regular rhythm.      Heart sounds: Normal heart sounds.   Pulmonary:      Effort: Pulmonary effort is normal.      Breath sounds: Normal breath sounds.   Abdominal:      General: Bowel sounds are normal.      Palpations: Abdomen is soft.   Musculoskeletal:         General: No swelling.   Skin:     Coloration: Skin is not jaundiced.   Neurological:      General: No focal deficit present.      Mental Status: She is alert and oriented to person, place, and time.         RESULTS   Results Review:    Results from last 7 days   Lab Units 23  0616 23  0734 23  1038   SODIUM mmol/L 135* 134* 139   POTASSIUM mmol/L 5.2 4.9 6.5*   CHLORIDE mmol/L 99 96* 104   CO2 mmol/L 23.0 24.0 19.0*   BUN mg/dL 62* 48* 85*   CREATININE mg/dL 5.78* 4.52* 7.00*   CALCIUM mg/dL 9.8 " 9.7 9.4   BILIRUBIN mg/dL  --   --  0.4   ALK PHOS U/L  --   --  282*   ALT (SGPT) U/L  --   --  13   AST (SGOT) U/L  --   --  17   GLUCOSE mg/dL 121* 86 60*       Estimated Creatinine Clearance: 11.6 mL/min (A) (by C-G formula based on SCr of 5.78 mg/dL (H)).    Results from last 7 days   Lab Units 05/11/23  1038   MAGNESIUM mg/dL 2.1             Results from last 7 days   Lab Units 05/13/23  0616 05/12/23  0734 05/11/23  1038   WBC 10*3/mm3 6.59 6.04 6.21   HEMOGLOBIN g/dL 9.7* 10.3* 8.7*   PLATELETS 10*3/mm3 208 233 187               Imaging Results (Last 24 Hours)     ** No results found for the last 24 hours. **           MEDICATIONS    atorvastatin, 40 mg, Oral, Daily  carvedilol, 6.25 mg, Oral, BID With Meals  cefTRIAXone, 1 g, Intravenous, Q24H  cetirizine, 5 mg, Oral, Daily  clopidogrel, 75 mg, Oral, Daily  cyclobenzaprine, 5 mg, Oral, BID  docusate sodium, 100 mg, Oral, BID  DULoxetine, 20 mg, Oral, Daily  heparin (porcine), 5,000 Units, Subcutaneous, Q12H  Insulin Aspart, 0-9 Units, Subcutaneous, TID AC  insulin aspart, 14 Units, Subcutaneous, TID With Meals  insulin detemir, 24 Units, Subcutaneous, Nightly  losartan, 25 mg, Oral, Daily  memantine, 5 mg, Oral, BID  oxyCODONE, 10 mg, Oral, TID  pantoprazole, 40 mg, Oral, Nightly  sevelamer, 800 mg, Oral, TID With Meals  sodium chloride, 10 mL, Intravenous, Q12H           Assessment & Plan   ASSESSMENT / PLAN      ESRD (end stage renal disease) on dialysis    1.  ESRD on hemodialysis- normally dialyzes TTS at [facility] in [place].  She missed her dialysis on Tuesday. Had hd on Thursday. Next hd tomorrow. Using avf or tunnel cath these days.    . Will run 1K for first part of the treatment UF goal is 3-4 L. Plan to use AVF on right today if possible     2.  Hyperkalemia- now better     3.  Anemia of CKD hgb stable     4.  CAD     5.  DM2     6.  CKD-MBD     7.  Hypertension     8. uti on rocephin             This document has been electronically signed  by Tee Jacobo MD on May 13, 2023 10:04 CDT

## 2023-05-14 LAB
ANION GAP SERPL CALCULATED.3IONS-SCNC: 17 MMOL/L (ref 5–15)
BASOPHILS # BLD AUTO: 0.09 10*3/MM3 (ref 0–0.2)
BASOPHILS NFR BLD AUTO: 1.1 % (ref 0–1.5)
BUN SERPL-MCNC: 36 MG/DL (ref 8–23)
BUN/CREAT SERPL: 8.7 (ref 7–25)
CALCIUM SPEC-SCNC: 10.1 MG/DL (ref 8.6–10.5)
CHLORIDE SERPL-SCNC: 91 MMOL/L (ref 98–107)
CO2 SERPL-SCNC: 25 MMOL/L (ref 22–29)
CREAT SERPL-MCNC: 4.14 MG/DL (ref 0.57–1)
DEPRECATED RDW RBC AUTO: 47.7 FL (ref 37–54)
EGFRCR SERPLBLD CKD-EPI 2021: 11.5 ML/MIN/1.73
EOSINOPHIL # BLD AUTO: 0.31 10*3/MM3 (ref 0–0.4)
EOSINOPHIL NFR BLD AUTO: 4 % (ref 0.3–6.2)
ERYTHROCYTE [DISTWIDTH] IN BLOOD BY AUTOMATED COUNT: 14.5 % (ref 12.3–15.4)
GLUCOSE BLDC GLUCOMTR-MCNC: 154 MG/DL (ref 70–130)
GLUCOSE BLDC GLUCOMTR-MCNC: 154 MG/DL (ref 70–130)
GLUCOSE BLDC GLUCOMTR-MCNC: 185 MG/DL (ref 70–130)
GLUCOSE BLDC GLUCOMTR-MCNC: 303 MG/DL (ref 70–130)
GLUCOSE SERPL-MCNC: 157 MG/DL (ref 65–99)
HCT VFR BLD AUTO: 35.9 % (ref 34–46.6)
HGB BLD-MCNC: 11.5 G/DL (ref 12–15.9)
IMM GRANULOCYTES # BLD AUTO: 0.11 10*3/MM3 (ref 0–0.05)
IMM GRANULOCYTES NFR BLD AUTO: 1.4 % (ref 0–0.5)
LYMPHOCYTES # BLD AUTO: 2.41 10*3/MM3 (ref 0.7–3.1)
LYMPHOCYTES NFR BLD AUTO: 30.8 % (ref 19.6–45.3)
MCH RBC QN AUTO: 28.9 PG (ref 26.6–33)
MCHC RBC AUTO-ENTMCNC: 32 G/DL (ref 31.5–35.7)
MCV RBC AUTO: 90.2 FL (ref 79–97)
MONOCYTES # BLD AUTO: 0.79 10*3/MM3 (ref 0.1–0.9)
MONOCYTES NFR BLD AUTO: 10.1 % (ref 5–12)
NEUTROPHILS NFR BLD AUTO: 4.12 10*3/MM3 (ref 1.7–7)
NEUTROPHILS NFR BLD AUTO: 52.6 % (ref 42.7–76)
NRBC BLD AUTO-RTO: 0 /100 WBC (ref 0–0.2)
PLATELET # BLD AUTO: 238 10*3/MM3 (ref 140–450)
PMV BLD AUTO: 8.7 FL (ref 6–12)
POTASSIUM SERPL-SCNC: 4.2 MMOL/L (ref 3.5–5.2)
RBC # BLD AUTO: 3.98 10*6/MM3 (ref 3.77–5.28)
SODIUM SERPL-SCNC: 133 MMOL/L (ref 136–145)
WBC NRBC COR # BLD: 7.83 10*3/MM3 (ref 3.4–10.8)

## 2023-05-14 PROCEDURE — 25010000002 MEROPENEM PER 100 MG: Performed by: HOSPITALIST

## 2023-05-14 PROCEDURE — 82948 REAGENT STRIP/BLOOD GLUCOSE: CPT

## 2023-05-14 PROCEDURE — 63710000001 INSULIN DETEMIR PER 5 UNITS: Performed by: HOSPITALIST

## 2023-05-14 PROCEDURE — 63710000001 INSULIN ASPART PER 5 UNITS: Performed by: HOSPITALIST

## 2023-05-14 PROCEDURE — 25010000002 HEPARIN (PORCINE) PER 1000 UNITS: Performed by: HOSPITALIST

## 2023-05-14 PROCEDURE — 96372 THER/PROPH/DIAG INJ SC/IM: CPT

## 2023-05-14 PROCEDURE — 80048 BASIC METABOLIC PNL TOTAL CA: CPT | Performed by: HOSPITALIST

## 2023-05-14 PROCEDURE — 63710000001 PROMETHAZINE PER 25 MG: Performed by: HOSPITALIST

## 2023-05-14 PROCEDURE — 85025 COMPLETE CBC W/AUTO DIFF WBC: CPT | Performed by: HOSPITALIST

## 2023-05-14 PROCEDURE — 94799 UNLISTED PULMONARY SVC/PX: CPT

## 2023-05-14 RX ORDER — NYSTATIN 100000 [USP'U]/G
POWDER TOPICAL EVERY 12 HOURS SCHEDULED
Status: DISCONTINUED | OUTPATIENT
Start: 2023-05-14 | End: 2023-05-19 | Stop reason: HOSPADM

## 2023-05-14 RX ADMIN — OXYCODONE HYDROCHLORIDE 10 MG: 5 TABLET ORAL at 21:45

## 2023-05-14 RX ADMIN — INSULIN ASPART 14 UNITS: 100 INJECTION, SOLUTION INTRAVENOUS; SUBCUTANEOUS at 08:52

## 2023-05-14 RX ADMIN — LOSARTAN POTASSIUM 25 MG: 25 TABLET, FILM COATED ORAL at 08:54

## 2023-05-14 RX ADMIN — MEMANTINE 5 MG: 5 TABLET ORAL at 21:46

## 2023-05-14 RX ADMIN — INSULIN DETEMIR 24 UNITS: 100 INJECTION, SOLUTION SUBCUTANEOUS at 21:46

## 2023-05-14 RX ADMIN — DOCUSATE SODIUM 100 MG: 100 CAPSULE, LIQUID FILLED ORAL at 21:46

## 2023-05-14 RX ADMIN — DULOXETINE HYDROCHLORIDE 20 MG: 20 CAPSULE, DELAYED RELEASE ORAL at 08:49

## 2023-05-14 RX ADMIN — ATORVASTATIN CALCIUM 40 MG: 40 TABLET, FILM COATED ORAL at 08:49

## 2023-05-14 RX ADMIN — SEVELAMER CARBONATE 800 MG: 800 TABLET, FILM COATED ORAL at 08:49

## 2023-05-14 RX ADMIN — CYCLOBENZAPRINE 5 MG: 5 TABLET, FILM COATED ORAL at 08:49

## 2023-05-14 RX ADMIN — PANTOPRAZOLE SODIUM 40 MG: 40 TABLET, DELAYED RELEASE ORAL at 21:46

## 2023-05-14 RX ADMIN — INSULIN ASPART 2 UNITS: 100 INJECTION, SOLUTION INTRAVENOUS; SUBCUTANEOUS at 17:27

## 2023-05-14 RX ADMIN — INSULIN ASPART 14 UNITS: 100 INJECTION, SOLUTION INTRAVENOUS; SUBCUTANEOUS at 17:26

## 2023-05-14 RX ADMIN — HEPARIN SODIUM 5000 UNITS: 5000 INJECTION INTRAVENOUS; SUBCUTANEOUS at 21:46

## 2023-05-14 RX ADMIN — DOCUSATE SODIUM 100 MG: 100 CAPSULE, LIQUID FILLED ORAL at 08:49

## 2023-05-14 RX ADMIN — CYCLOBENZAPRINE 5 MG: 5 TABLET, FILM COATED ORAL at 21:45

## 2023-05-14 RX ADMIN — CETIRIZINE HYDROCHLORIDE 5 MG: 5 TABLET ORAL at 08:49

## 2023-05-14 RX ADMIN — CLOPIDOGREL BISULFATE 75 MG: 75 TABLET ORAL at 08:49

## 2023-05-14 RX ADMIN — CARVEDILOL 6.25 MG: 6.25 TABLET, FILM COATED ORAL at 08:50

## 2023-05-14 RX ADMIN — INSULIN ASPART 2 UNITS: 100 INJECTION, SOLUTION INTRAVENOUS; SUBCUTANEOUS at 08:50

## 2023-05-14 RX ADMIN — HEPARIN SODIUM 5000 UNITS: 5000 INJECTION INTRAVENOUS; SUBCUTANEOUS at 08:50

## 2023-05-14 RX ADMIN — INSULIN ASPART 14 UNITS: 100 INJECTION, SOLUTION INTRAVENOUS; SUBCUTANEOUS at 12:04

## 2023-05-14 RX ADMIN — OXYCODONE HYDROCHLORIDE 10 MG: 5 TABLET ORAL at 15:50

## 2023-05-14 RX ADMIN — Medication 10 ML: at 21:46

## 2023-05-14 RX ADMIN — INSULIN ASPART 7 UNITS: 100 INJECTION, SOLUTION INTRAVENOUS; SUBCUTANEOUS at 12:04

## 2023-05-14 RX ADMIN — PROMETHAZINE HYDROCHLORIDE 25 MG: 25 TABLET ORAL at 08:49

## 2023-05-14 RX ADMIN — Medication 10 ML: at 08:55

## 2023-05-14 RX ADMIN — NYSTATIN: 100000 POWDER TOPICAL at 21:45

## 2023-05-14 RX ADMIN — OXYCODONE HYDROCHLORIDE 10 MG: 5 TABLET ORAL at 08:49

## 2023-05-14 RX ADMIN — HYDROXYZINE HYDROCHLORIDE 25 MG: 25 TABLET, FILM COATED ORAL at 09:00

## 2023-05-14 RX ADMIN — MEMANTINE 5 MG: 5 TABLET ORAL at 08:49

## 2023-05-14 RX ADMIN — MEROPENEM 500 MG: 500 INJECTION, POWDER, FOR SOLUTION INTRAVENOUS at 12:07

## 2023-05-14 RX ADMIN — SEVELAMER CARBONATE 800 MG: 800 TABLET, FILM COATED ORAL at 12:04

## 2023-05-14 RX ADMIN — NYSTATIN: 100000 POWDER TOPICAL at 09:07

## 2023-05-14 RX ADMIN — LOSARTAN POTASSIUM 25 MG: 25 TABLET, FILM COATED ORAL at 05:10

## 2023-05-14 RX ADMIN — SEVELAMER CARBONATE 800 MG: 800 TABLET, FILM COATED ORAL at 17:26

## 2023-05-14 NOTE — PLAN OF CARE
Problem: Device-Related Complication Risk (Hemodialysis)  Goal: Safe, Effective Therapy Delivery  Outcome: Ongoing, Progressing     Problem: Hemodynamic Instability (Hemodialysis)  Goal: Effective Tissue Perfusion  Outcome: Ongoing, Progressing     Problem: Infection (Hemodialysis)  Goal: Absence of Infection Signs and Symptoms  Outcome: Ongoing, Progressing     Problem: Fall Injury Risk  Goal: Absence of Fall and Fall-Related Injury  Outcome: Ongoing, Progressing  Intervention: Promote Injury-Free Environment  Recent Flowsheet Documentation  Taken 5/14/2023 0400 by July Cedeño RN  Safety Promotion/Fall Prevention: safety round/check completed  Taken 5/14/2023 0205 by July Cedeño RN  Safety Promotion/Fall Prevention: safety round/check completed  Taken 5/14/2023 0003 by July Cedeño RN  Safety Promotion/Fall Prevention: safety round/check completed  Taken 5/13/2023 2200 by July Cedeño RN  Safety Promotion/Fall Prevention: safety round/check completed  Taken 5/13/2023 2120 by July Cedeño RN  Safety Promotion/Fall Prevention: safety round/check completed  Taken 5/13/2023 2010 by July Cedeño RN  Safety Promotion/Fall Prevention: safety round/check completed  Taken 5/13/2023 1903 by July Cedeño RN  Safety Promotion/Fall Prevention: safety round/check completed     Problem: Skin Injury Risk Increased  Goal: Skin Health and Integrity  Outcome: Ongoing, Progressing  Intervention: Optimize Skin Protection  Recent Flowsheet Documentation  Taken 5/14/2023 0400 by July Cedeño RN  Head of Bed (HOB) Positioning: HOB elevated  Taken 5/14/2023 0205 by July Cedeño RN  Head of Bed (HOB) Positioning: HOB elevated  Taken 5/14/2023 0003 by July Cedeño RN  Head of Bed (HOB) Positioning: HOB elevated  Taken 5/13/2023 2200 by July Cedeño RN  Head of Bed (HOB) Positioning: HOB elevated  Taken 5/13/2023 2120 by July Cedeño RN  Head of Bed (HOB) Positioning: HOB elevated  Taken 5/13/2023 2010  by July Cedeño, RN  Head of Bed (HOB) Positioning: HOB elevated     Problem: Diabetes Comorbidity  Goal: Blood Glucose Level Within Targeted Range  Outcome: Ongoing, Progressing  Intervention: Monitor and Manage Glycemia  Recent Flowsheet Documentation  Taken 5/13/2023 2120 by July Cedeño, RN  Glycemic Management: blood glucose monitored     Problem: Heart Failure Comorbidity  Goal: Maintenance of Heart Failure Symptom Control  Outcome: Ongoing, Progressing     Problem: Hypertension Comorbidity  Goal: Blood Pressure in Desired Range  Outcome: Ongoing, Progressing     Problem: Obstructive Sleep Apnea Risk or Actual Comorbidity Management  Goal: Unobstructed Breathing During Sleep  Outcome: Ongoing, Progressing     Problem: Pain Chronic (Persistent) (Comorbidity Management)  Goal: Acceptable Pain Control and Functional Ability  Outcome: Ongoing, Progressing   Goal Outcome Evaluation:

## 2023-05-14 NOTE — CONSULTS
"Discharge Planning Assessment  [facility]     Patient Name: Yamileth Slater  MRN: [id number]  Today's Date: 5/14/2023    Admit Date: 5/11/2023    Plan: Continue Care LTACH referral pending   Discharge Needs Assessment    No documentation.                Discharge Plan     Row Name 05/14/23 1438       Plan    Plan Continue Care LTACH referral pending    Patient/Family in Agreement with Plan yes    Plan Comments SW received consult regarding patient needing iv anbx q24 hours until 5/19/23. Met with patient and sister. They had concerns with patient d/c home with iv anbx. they had concerns with her IV \"blowing\" as well as her getting to and from HD outpatient while trying to manage iv anbx at home. Patient reports issues getting in and out of car currently so does not feel that outpatient would work given her HD days as well. SW informed her we could look into LTACH as an option but they would have to review referral and also get authorization from insurance. She voiced understanding. GURU placed referral in basket for [facility] this date. CM will f/u on Monday regarding referral. If they are not able to accept, will need alternative plan such as SNF or making arrangements with HD or outpatient/transportation options....Keena OLVERA, MSW, LSW              Continued Care and Services - Admitted Since 5/11/2023     Destination     Service Provider Request Status Selected Services Address Phone Fax Patient Preferred    [facility] Pending - Request Sent N/A [address] [place] [address] [address] [facility] [phone] --            Selected Continued Care - Prior Encounters Includes continued care and service providers with selected services from prior encounters from 2/10/2023 to 5/14/2023    Discharged on 3/16/2023 Admission date: 3/15/2023 - Discharge disposition: Skilled Nursing Facility (DC - External)    Destination     Service Provider Selected Services Address Phone " Fax Patient Preferred    [facility] [address] [place] [id number] [phone] --                    Expected Discharge Date and Time     Expected Discharge Date Expected Discharge Time    May 15, 2023          Demographic Summary    No documentation.                Functional Status    No documentation.                Psychosocial    No documentation.                Abuse/Neglect    No documentation.                Legal    No documentation.                Substance Abuse    No documentation.                Patient Forms    No documentation.                   JOHN Goodman

## 2023-05-14 NOTE — DISCHARGE PLACEMENT REQUEST
"Yamileth Slater (63 y.o. Female)     Date of Birth   1960    Social Security Number       Address   [address] [place] [address]    Home Phone   [phone]    MRN   [id number]       Confucianist   None    Marital Status   Legally                             Admission Date   5/11/23    Admission Type   Emergency    Admitting Provider   Jef Ramires MD    Attending Provider   Jef Ramires MD    Department, Room/Bed   [address], [facility]/1       Discharge Date       Discharge Disposition       Discharge Destination                               Attending Provider: Jef Ramires MD    Allergies: Adhesive Tape, Nsaids, Latex, Other, Wound Dressing Adhesive    Isolation: Contact   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), 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)   Code Status: CPR    Ht: 157.5 cm (62\")   Wt: 108 kg (239 lb 1.6 oz)    Admission Cmt: None   Principal Problem: ESRD (end stage renal disease) on dialysis [N18.6,Z99.2]                 Active Insurance as of 5/11/2023     Primary Coverage     Payor Plan Insurance Group Employer/Plan Group    ANTHEM MEDICARE REPLACEMENT ANTHEM MEDICARE ADVANTAGE [id number]     Payor Plan Address Payor Plan Phone Number Payor Plan Fax Number Effective Dates    [address] [phone]  1/1/2022 - None Entered    [facility] [id number]       Subscriber Name Subscriber Birth Date Member ID       YAMILETH SLATER 1960 [id number]           Secondary Coverage     Payor Plan Insurance Group Employer/Plan Group    [place]      Payor Plan Address Payor Plan Phone Number Payor Plan Fax Number Effective Dates    [address] [phone]  6/28/2019 - None Entered    [facility] [address]       Subscriber Name Subscriber Birth Date Member ID       YAMILETH SLATER 1960 [id number]                 Emergency Contacts      (Rel.) Home Phone Work Phone Mobile Phone    " RAMONA KHAN (Sister) -- -- [phone]    Yamileth Chavez (Daughter) [phone] -- [phone]    Suzanne Rivera (Daughter) [phone] -- [phone]

## 2023-05-14 NOTE — PROGRESS NOTES
"  [facility]  [address] [place] [address]. [id number]  T - [phone]  F - [phone]     Progress Note          PATIENT  DEMOGRAPHICS   PATIENT NAME: Yamileth Slater                      PHYSICIAN: Tee Jacobo MD  : 1960  MRN: [id number]   LOS: 2 days    Patient Care Team:  Kiersten Wilson APRN as PCP - General (Family Medicine)  Subjective   SUBJECTIVE     Congnition is much better, she reports no complains          Objective   OBJECTIVE   Vital Signs  Temp:  [97 °F (36.1 °C)-97.9 °F (36.6 °C)] 97.2 °F (36.2 °C)  Heart Rate:  [66-89] 78  Resp:  [16-18] 18  BP: (112-170)/(46-88) 124/79    Flowsheet Rows    Flowsheet Row First Filed Value   Admission Height 157.5 cm (62\") Documented at 2023 1051   Admission Weight 111 kg (245 lb) Documented at 2023 1051           I/O last 3 completed shifts:  In: 1200 [P.O.:1200]  Out: 4550 [Urine:1550; Other:3000]    PHYSICAL EXAM    Physical Exam  Constitutional:       Appearance: She is well-developed.   HENT:      Head: Normocephalic.   Eyes:      Pupils: Pupils are equal, round, and reactive to light.   Cardiovascular:      Rate and Rhythm: Normal rate and regular rhythm.      Heart sounds: Normal heart sounds.   Pulmonary:      Effort: Pulmonary effort is normal.      Breath sounds: Normal breath sounds.   Abdominal:      General: Bowel sounds are normal.      Palpations: Abdomen is soft.   Musculoskeletal:         General: No swelling.   Skin:     Coloration: Skin is not jaundiced.   Neurological:      General: No focal deficit present.      Mental Status: She is alert and oriented to person, place, and time.         RESULTS   Results Review:    Results from last 7 days   Lab Units 23  0552 23  0616 23  0734 23  1038   SODIUM mmol/L 133* 135* 134* 139   POTASSIUM mmol/L 4.2 5.2 4.9 6.5*   CHLORIDE mmol/L 91* 99 96* 104   CO2 mmol/L 25.0 23.0 24.0 19.0*   BUN mg/dL 36* 62* 48* 85*   CREATININE " mg/dL 4.14* 5.78* 4.52* 7.00*   CALCIUM mg/dL 10.1 9.8 9.7 9.4   BILIRUBIN mg/dL  --   --   --  0.4   ALK PHOS U/L  --   --   --  282*   ALT (SGPT) U/L  --   --   --  13   AST (SGOT) U/L  --   --   --  17   GLUCOSE mg/dL 157* 121* 86 60*       Estimated Creatinine Clearance: 15.9 mL/min (A) (by C-G formula based on SCr of 4.14 mg/dL (H)).    Results from last 7 days   Lab Units 05/11/23  1038   MAGNESIUM mg/dL 2.1             Results from last 7 days   Lab Units 05/14/23  0552 05/13/23  0616 05/12/23  0734 05/11/23  1038   WBC 10*3/mm3 7.83 6.59 6.04 6.21   HEMOGLOBIN g/dL 11.5* 9.7* 10.3* 8.7*   PLATELETS 10*3/mm3 238 208 233 187               Imaging Results (Last 24 Hours)     ** No results found for the last 24 hours. **           MEDICATIONS    atorvastatin, 40 mg, Oral, Daily  carvedilol, 6.25 mg, Oral, BID With Meals  cetirizine, 5 mg, Oral, Daily  clopidogrel, 75 mg, Oral, Daily  cyclobenzaprine, 5 mg, Oral, BID  docusate sodium, 100 mg, Oral, BID  DULoxetine, 20 mg, Oral, Daily  heparin (porcine), 5,000 Units, Subcutaneous, Q12H  Insulin Aspart, 0-9 Units, Subcutaneous, TID AC  insulin aspart, 14 Units, Subcutaneous, TID With Meals  insulin detemir, 24 Units, Subcutaneous, Nightly  losartan, 25 mg, Oral, Daily  memantine, 5 mg, Oral, BID  meropenem, 500 mg, Intravenous, Q24H  nystatin, , Topical, Q12H  oxyCODONE, 10 mg, Oral, TID  pantoprazole, 40 mg, Oral, Nightly  sevelamer, 800 mg, Oral, TID With Meals  sodium chloride, 10 mL, Intravenous, Q12H      Pharmacy to Dose meropenem (MERREM),         Assessment & Plan   ASSESSMENT / PLAN      ESRD (end stage renal disease) on dialysis    1.  ESRD on hemodialysis- normally dialyzes TTS at [facility] in [place].  She missed her dialysis on Tuesday. Had hd on Thursday. Next hd tomorrow. Using avf or tunnel cath these days.    continue HD TTS     2.  Hyperkalemia- now better     3.  Anemia of CKD hgb stable     4.  CAD     5.  DM2     6.  CKD-MBD     7.   Hypertension     8. uti on rocephin             This document has been electronically signed by Tee Jacobo MD on May 14, 2023 09:40 CDT

## 2023-05-14 NOTE — PROGRESS NOTES
[facility] Medicine Services  INPATIENT PROGRESS NOTE    Length of Stay: 2  Date of Admission: 5/11/2023  Primary Care Physician: Kiersten Wilson APRN    Subjective   Chief Complaint: Confusion  HPI: Patient states she is feeling much better.  States she wants to go home.    As of today, 05/14/23  Review of Systems   Constitutional: Negative for appetite change, chills, fatigue, fever and unexpected weight change.   Respiratory: Negative for cough, choking, chest tightness, shortness of breath and wheezing.    Cardiovascular: Negative for chest pain, palpitations and leg swelling.   Gastrointestinal: Negative for abdominal pain, blood in stool, constipation, diarrhea, nausea and vomiting.   Genitourinary: Negative for dysuria, flank pain and hematuria.   Musculoskeletal: Positive for back pain and neck pain.   Neurological: Negative for dizziness, seizures, syncope, speech difficulty, weakness, light-headedness, numbness and headaches.   Hematological: Does not bruise/bleed easily.        All pertinent negatives and positives are as above. All other systems have been reviewed and are negative unless otherwise stated.    Objective    Temp:  [97 °F (36.1 °C)-97.9 °F (36.6 °C)] 97.2 °F (36.2 °C)  Heart Rate:  [66-89] 78  Resp:  [16-18] 18  BP: (112-170)/(46-88) 124/79    AM-PAC 6 Clicks Score (PT): 12 (05/14/23 0900)    As of today, 05/14/23  Physical Exam  Vitals reviewed.   Constitutional:       Appearance: She is well-developed.   HENT:      Head: Normocephalic and atraumatic.   Eyes:      Pupils: Pupils are equal, round, and reactive to light.   Cardiovascular:      Rate and Rhythm: Normal rate and regular rhythm.      Heart sounds: Normal heart sounds. No murmur heard.    No friction rub. No gallop.   Pulmonary:      Effort: Pulmonary effort is normal. No respiratory distress.      Breath sounds: Normal breath sounds. No wheezing or rales.   Chest:       Chest wall: No tenderness.   Abdominal:      General: Bowel sounds are normal. There is no distension.      Palpations: Abdomen is soft.      Tenderness: There is no abdominal tenderness. There is no guarding.   Musculoskeletal:      Cervical back: Normal range of motion and neck supple.      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   Neurological:      Comments: More oriented today.     Psychiatric:         Behavior: Behavior normal.         Thought Content: Thought content normal.           Results Review:  I have reviewed the labs, radiology results, and diagnostic studies.    Laboratory Data:   Results from last 7 days   Lab Units 05/14/23  0552 05/13/23  0616 05/12/23  0734 05/11/23  1038   SODIUM mmol/L 133* 135* 134* 139   POTASSIUM mmol/L 4.2 5.2 4.9 6.5*   CHLORIDE mmol/L 91* 99 96* 104   CO2 mmol/L 25.0 23.0 24.0 19.0*   BUN mg/dL 36* 62* 48* 85*   CREATININE mg/dL 4.14* 5.78* 4.52* 7.00*   GLUCOSE mg/dL 157* 121* 86 60*   CALCIUM mg/dL 10.1 9.8 9.7 9.4   BILIRUBIN mg/dL  --   --   --  0.4   ALK PHOS U/L  --   --   --  282*   ALT (SGPT) U/L  --   --   --  13   AST (SGOT) U/L  --   --   --  17   ANION GAP mmol/L 17.0* 13.0 14.0 16.0*     Estimated Creatinine Clearance: 15.9 mL/min (A) (by C-G formula based on SCr of 4.14 mg/dL (H)).  Results from last 7 days   Lab Units 05/11/23  1038   MAGNESIUM mg/dL 2.1         Results from last 7 days   Lab Units 05/14/23  0552 05/13/23  0616 05/12/23  0734 05/11/23  1038   WBC 10*3/mm3 7.83 6.59 6.04 6.21   HEMOGLOBIN g/dL 11.5* 9.7* 10.3* 8.7*   HEMATOCRIT % 35.9 30.1* 32.7* 28.7*   PLATELETS 10*3/mm3 238 208 233 187           Culture Data:   No results found for: BLOODCX  Urine Culture   Date Value Ref Range Status   05/11/2023 >100,000 CFU/mL Escherichia coli ESBL (A)  Final     Comment:       Consider infectious disease consult.  Susceptibility results may not correlate to clinical outcomes.      No results found for: RESPCX  No results found for: WOUNDCX  No results found for: STOOLCX  No components found for: BODYFLD    Radiology Data:   Imaging Results (Last 24 Hours)     ** No results found for the last 24 hours. **          I have utilized all available immediate resources to obtain, update, or review the patient's current medications (including all prescriptions, over-the-counter products, herbals, cannabis/cannabidiol products, and vitamin/mineral/dietary (nutritional) supplements).       Assessment/Plan     Active Hospital Problems    Diagnosis    • **ESRD (end stage renal disease) on dialysis        Plan:    1.  Altered mental status: Most likely multifactorial.  Likely secondary to inflammation of urinary tract infection and uremia.  Better today.  Confusion clearing.    2.  End-stage renal disease: Appreciate nephrology help.  Dialyzed Tuesday, Thursday, and Saturday.  3.  Hyperkalemia: Management with hemodialysis.  4.  Coronary artery disease: Continue home medication.  5.  Urinary tract infection: Urine culture shows ESBL E. coli.  Continue meropenem.  We will see if we can arrange for home dosing.  6.  Diabetes mellitus: Long-acting insulin and sliding scale insulin.  7.  Morbid obesity  8.  DVT prophylaxis: Heparin.    Medical Decision Making  Number and Complexity of problems: 2 highly complex medical problems    Conditions and Status:        Condition is improving.     MDM Data  External documents reviewed: Primary care and subspecialty notes  My EKG interpretation: Normal sinus rhythm  My plain film interpretation: No acute cardiopulmonary disease     Discussed with: Patient     Treatment Plan  As above    Care Planning  Shared decision making: Patient in agreement with plan of care  Code status and discussions: Full code    Disposition  Social Determinants of Health that impact treatment or disposition: None  I expect the patient to be discharged to skilled facility in 2-3 days.        The patient was evaluated during the global COVID-19 pandemic, and the diagnosis was suspected/considered upon their initial presentation.  Evaluation, treatment, and testing were consistent with current guidelines for patients who present with complaints or symptoms that may be related to COVID-19.    I confirmed that the patient's Advance Care Plan is present, code status is documented, or surrogate decision maker is listed in the patient's medical record.        This document has been electronically signed by Jef Ramires MD on May 14, 2023 10:01 CDT

## 2023-05-15 LAB
GLUCOSE BLDC GLUCOMTR-MCNC: 159 MG/DL (ref 70–130)
GLUCOSE BLDC GLUCOMTR-MCNC: 165 MG/DL (ref 70–130)
GLUCOSE BLDC GLUCOMTR-MCNC: 192 MG/DL (ref 70–130)
GLUCOSE BLDC GLUCOMTR-MCNC: 225 MG/DL (ref 70–130)
HOLD SPECIMEN: NORMAL
WHOLE BLOOD HOLD COAG: NORMAL
WHOLE BLOOD HOLD SPECIMEN: NORMAL

## 2023-05-15 PROCEDURE — 25010000002 MEROPENEM PER 100 MG: Performed by: HOSPITALIST

## 2023-05-15 PROCEDURE — 63710000001 INSULIN ASPART PER 5 UNITS: Performed by: HOSPITALIST

## 2023-05-15 PROCEDURE — 96372 THER/PROPH/DIAG INJ SC/IM: CPT

## 2023-05-15 PROCEDURE — 99213 OFFICE O/P EST LOW 20 MIN: CPT | Performed by: NURSE PRACTITIONER

## 2023-05-15 PROCEDURE — 25010000002 HEPARIN (PORCINE) PER 1000 UNITS: Performed by: HOSPITALIST

## 2023-05-15 PROCEDURE — 63710000001 INSULIN DETEMIR PER 5 UNITS: Performed by: HOSPITALIST

## 2023-05-15 PROCEDURE — 82948 REAGENT STRIP/BLOOD GLUCOSE: CPT

## 2023-05-15 PROCEDURE — 97605 NEG PRS WND THER DME<=50SQCM: CPT | Performed by: NURSE PRACTITIONER

## 2023-05-15 RX ADMIN — DOCUSATE SODIUM 100 MG: 100 CAPSULE, LIQUID FILLED ORAL at 21:41

## 2023-05-15 RX ADMIN — MEMANTINE 5 MG: 5 TABLET ORAL at 21:42

## 2023-05-15 RX ADMIN — INSULIN ASPART 2 UNITS: 100 INJECTION, SOLUTION INTRAVENOUS; SUBCUTANEOUS at 17:37

## 2023-05-15 RX ADMIN — SEVELAMER CARBONATE 800 MG: 800 TABLET, FILM COATED ORAL at 11:37

## 2023-05-15 RX ADMIN — INSULIN ASPART 4 UNITS: 100 INJECTION, SOLUTION INTRAVENOUS; SUBCUTANEOUS at 11:37

## 2023-05-15 RX ADMIN — HYDROXYZINE HYDROCHLORIDE 25 MG: 25 TABLET, FILM COATED ORAL at 08:28

## 2023-05-15 RX ADMIN — ATORVASTATIN CALCIUM 40 MG: 40 TABLET, FILM COATED ORAL at 08:17

## 2023-05-15 RX ADMIN — Medication 10 ML: at 21:43

## 2023-05-15 RX ADMIN — INSULIN DETEMIR 24 UNITS: 100 INJECTION, SOLUTION SUBCUTANEOUS at 21:42

## 2023-05-15 RX ADMIN — LOSARTAN POTASSIUM 25 MG: 25 TABLET, FILM COATED ORAL at 05:46

## 2023-05-15 RX ADMIN — HEPARIN SODIUM 5000 UNITS: 5000 INJECTION INTRAVENOUS; SUBCUTANEOUS at 21:42

## 2023-05-15 RX ADMIN — DOCUSATE SODIUM 100 MG: 100 CAPSULE, LIQUID FILLED ORAL at 08:18

## 2023-05-15 RX ADMIN — CARVEDILOL 6.25 MG: 6.25 TABLET, FILM COATED ORAL at 17:36

## 2023-05-15 RX ADMIN — INSULIN ASPART 14 UNITS: 100 INJECTION, SOLUTION INTRAVENOUS; SUBCUTANEOUS at 08:18

## 2023-05-15 RX ADMIN — INSULIN ASPART 14 UNITS: 100 INJECTION, SOLUTION INTRAVENOUS; SUBCUTANEOUS at 17:36

## 2023-05-15 RX ADMIN — SEVELAMER CARBONATE 800 MG: 800 TABLET, FILM COATED ORAL at 17:36

## 2023-05-15 RX ADMIN — INSULIN ASPART 2 UNITS: 100 INJECTION, SOLUTION INTRAVENOUS; SUBCUTANEOUS at 08:21

## 2023-05-15 RX ADMIN — SEVELAMER CARBONATE 800 MG: 800 TABLET, FILM COATED ORAL at 08:17

## 2023-05-15 RX ADMIN — HEPARIN SODIUM 5000 UNITS: 5000 INJECTION INTRAVENOUS; SUBCUTANEOUS at 08:18

## 2023-05-15 RX ADMIN — PANTOPRAZOLE SODIUM 40 MG: 40 TABLET, DELAYED RELEASE ORAL at 21:42

## 2023-05-15 RX ADMIN — OXYCODONE HYDROCHLORIDE 10 MG: 5 TABLET ORAL at 08:18

## 2023-05-15 RX ADMIN — CYCLOBENZAPRINE 5 MG: 5 TABLET, FILM COATED ORAL at 08:18

## 2023-05-15 RX ADMIN — MEMANTINE 5 MG: 5 TABLET ORAL at 08:17

## 2023-05-15 RX ADMIN — CYCLOBENZAPRINE 5 MG: 5 TABLET, FILM COATED ORAL at 21:42

## 2023-05-15 RX ADMIN — MEROPENEM 500 MG: 500 INJECTION, POWDER, FOR SOLUTION INTRAVENOUS at 13:46

## 2023-05-15 RX ADMIN — CLOPIDOGREL BISULFATE 75 MG: 75 TABLET ORAL at 08:18

## 2023-05-15 RX ADMIN — HYDROXYZINE HYDROCHLORIDE 25 MG: 25 TABLET, FILM COATED ORAL at 21:42

## 2023-05-15 RX ADMIN — CARVEDILOL 6.25 MG: 6.25 TABLET, FILM COATED ORAL at 08:17

## 2023-05-15 RX ADMIN — Medication 10 ML: at 08:21

## 2023-05-15 RX ADMIN — CETIRIZINE HYDROCHLORIDE 5 MG: 5 TABLET ORAL at 08:18

## 2023-05-15 RX ADMIN — INSULIN ASPART 14 UNITS: 100 INJECTION, SOLUTION INTRAVENOUS; SUBCUTANEOUS at 11:37

## 2023-05-15 RX ADMIN — NYSTATIN: 100000 POWDER TOPICAL at 08:18

## 2023-05-15 RX ADMIN — DULOXETINE HYDROCHLORIDE 20 MG: 20 CAPSULE, DELAYED RELEASE ORAL at 08:17

## 2023-05-15 RX ADMIN — OXYCODONE HYDROCHLORIDE 10 MG: 5 TABLET ORAL at 21:42

## 2023-05-15 RX ADMIN — OXYCODONE HYDROCHLORIDE 10 MG: 5 TABLET ORAL at 17:36

## 2023-05-15 NOTE — PLAN OF CARE
Problem: Device-Related Complication Risk (Hemodialysis)  Goal: Safe, Effective Therapy Delivery  Outcome: Ongoing, Progressing     Problem: Hemodynamic Instability (Hemodialysis)  Goal: Effective Tissue Perfusion  Outcome: Ongoing, Progressing     Problem: Infection (Hemodialysis)  Goal: Absence of Infection Signs and Symptoms  Outcome: Ongoing, Progressing     Problem: Fall Injury Risk  Goal: Absence of Fall and Fall-Related Injury  Outcome: Ongoing, Progressing  Intervention: Promote Injury-Free Environment  Recent Flowsheet Documentation  Taken 5/15/2023 0400 by July Cedeño RN  Safety Promotion/Fall Prevention: safety round/check completed  Taken 5/15/2023 0200 by July Cedeño RN  Safety Promotion/Fall Prevention: safety round/check completed  Taken 5/15/2023 0000 by July Cedeño RN  Safety Promotion/Fall Prevention: safety round/check completed  Taken 5/14/2023 2200 by July Cedeño RN  Safety Promotion/Fall Prevention: safety round/check completed  Taken 5/14/2023 2145 by July Cedeño RN  Safety Promotion/Fall Prevention: safety round/check completed  Taken 5/14/2023 2010 by July Cedeño RN  Safety Promotion/Fall Prevention: safety round/check completed  Taken 5/14/2023 1910 by July Cedeño RN  Safety Promotion/Fall Prevention: safety round/check completed     Problem: Skin Injury Risk Increased  Goal: Skin Health and Integrity  Outcome: Ongoing, Progressing  Intervention: Optimize Skin Protection  Recent Flowsheet Documentation  Taken 5/15/2023 0400 by July Cedeño RN  Head of Bed (HOB) Positioning: HOB elevated  Taken 5/15/2023 0200 by July Cedeño RN  Head of Bed (HOB) Positioning: HOB elevated  Taken 5/15/2023 0000 by July Cedeño RN  Head of Bed (HOB) Positioning: HOB elevated  Taken 5/14/2023 2200 by July Cedeño RN  Head of Bed (HOB) Positioning: HOB elevated  Taken 5/14/2023 2145 by July Cedeño RN  Head of Bed (HOB) Positioning: HOB elevated  Taken 5/14/2023 2010  by July Cedeño, RN  Head of Bed (HOB) Positioning: HOB elevated     Problem: Diabetes Comorbidity  Goal: Blood Glucose Level Within Targeted Range  Outcome: Ongoing, Progressing  Intervention: Monitor and Manage Glycemia  Recent Flowsheet Documentation  Taken 5/14/2023 2145 by July Cedeño, RN  Glycemic Management: blood glucose monitored     Problem: Heart Failure Comorbidity  Goal: Maintenance of Heart Failure Symptom Control  Outcome: Ongoing, Progressing     Problem: Hypertension Comorbidity  Goal: Blood Pressure in Desired Range  Outcome: Ongoing, Progressing     Problem: Obstructive Sleep Apnea Risk or Actual Comorbidity Management  Goal: Unobstructed Breathing During Sleep  Outcome: Ongoing, Progressing     Problem: Pain Chronic (Persistent) (Comorbidity Management)  Goal: Acceptable Pain Control and Functional Ability  Outcome: Ongoing, Progressing  Intervention: Optimize Psychosocial Wellbeing  Recent Flowsheet Documentation  Taken 5/14/2023 2145 by July Cedeño, RN  Supportive Measures: active listening utilized   Goal Outcome Evaluation:

## 2023-05-15 NOTE — PROGRESS NOTES
[facility]  INPATIENT WOUND & OSTOMY CARE    PROGRESS NOTE    Today's Date: 05/15/23    Patient Name: Yamileth Slater  MRN: [id number]  CSN: [id number]  PCP: Kiersten Wilson APRN  Referring Provider: Jef Ramires MD  Attending Provider: Jef Ramires MD  Length of Stay: 3    SUBJECTIVE   Chief Complaint: left foot wound    HPI: Yamileth Slater is a 63 year old female known to podiatry and wound care center. Has wound to right great toe from amputation. Patient has been seen in wound center for this with woundvac placed. Wound measures 0.6 cm x 0.5 cm x 1 cm. Wound is on left great toe.      Visit Dx:    ICD-10-CM ICD-9-CM   1. ESRD (end stage renal disease) on dialysis  N18.6 585.6    Z99.2 V45.11   2. Hyperkalemia  E87.5 276.7   3. Urinary tract infection in female  N39.0 599.0       Hospital Problem List:     ESRD (end stage renal disease) on dialysis      History:   Past Medical History:   Diagnosis Date   • Acute blood loss anemia 04/16/2017    Likely due to gastric oozing at this time. - Dr. Duarte (GI) was consulted and has now signed off, will follow up outpatient - pill colonoscopy showed AVMs - continue to monitor   • Acute cystitis with hematuria 03/31/2021 1/13: IV Rocephin 1 gm q 24 1/14 : transitioned  to omnicef 300mg. Urine cultures resulted and did not show growth. Omnicef discontinued as patient is asymptomatic   • Altered mental status 01/09/2022    - AMS on presentation - initial ABG pH 7.3, CO2 34 - Procal 0.29 - UA negative for acute cysitits -CTA head wnl  - Empiric Zosyn and Vancomycin -Lactate 2.5 on admission  - blood cultures no growth at 24 hours     • Anxiety    • CAD (coronary artery disease) 04/24/2021    S/P 3 stents 5/1/2021 for [facility]HL Continue ASA 81mg & Clopidogrel 75mg Continue Atorvastatin 40mg   • Carotid artery stenosis    • CHF (congestive heart failure)    • Chronic obstructive lung disease    • CKD (chronic kidney disease)  stage 4, GFR 15-29 ml/min    • CKD (chronic kidney disease), symptom management only, stage 5 10/05/2020    Results from last 7 days Lab Units 12/15/21 0548 12/14/21 1323 12/14/21 0916 CREATININE mg/dL 3.92* 3.21* 3.32*  Baseline creatinine 2-3 GFR 13-25 GFR 15 Dialysis MWF, sees Dr. Lauren Nephrology consult,, appreciate recommendations Continue Bumex 1mg bid daily Holding Bumex 2mg 4 times a week   • Colonic polyp    • Coronary arteriosclerosis    • Diabetes mellitus    • Diabetic neuropathy    • Ear pain, right 10/18/2021    - canal trauma due to patient scratching and DMT2 - added cortisporin ear drops   • Elevated troponin 10/12/2021    -most likely from CKD -Trending down -Neg chest pain   • Generalized abdominal pain 07/01/2022    Could be due to initiation of tube feeds vs dyspepsia vs abdominal cramps related to no PO intake due to intubation vs constipation Continue current laxative regiment  If no bowel movement by this afternoon will consider enema   • GERD (gastroesophageal reflux disease)    • GI bleed 05/13/2021    - GI will follow up outpatient - Protonix 40mg daily - Avoid medical DVT prophy and use mechanical at this time instead. - Continue to monitor - pill colonoscopy results showed AVMs   • History of transfusion    • Hypercholesterolemia    • Hyperosmolar hyperglycemic state (HHS) 06/25/2022    Serum glucose 605 on admission  Anion gap 16 PH 7.37 Bicarb 27.9 Continue fluids  Insulin drip with HHS protocol  Anion gap closed around 10 AM, received one dose of Levamir subq, will stop insulin drip after 2 hrs     • Hypertension    • Hypokalemia 05/27/2022    Will replace as needed. Will be cautious in the setting of ESRD to avoid need for emergency dialysis   Monitor Qtc intervals on EKG     • Hypomagnesemia 06/27/2021    Monitor and replace   • Morbid obesity    • Nephrolithiasis    • On mechanically assisted ventilation 06/26/2022    Vent management and sedation orders placed.  - Kiwigrid  intensivist group consulted for vent management appreciate recommendations  - plan to extubate today     • Peripheral vascular disease    • Pleural effusion on right 06/26/2022    CXR on 6/30/22 read as a small upper left pulmonary edema vs early pneumonia.  Last Echo 1/2022 EF 61-65 % Continue to monitor  Procal slightly improved, CRP improved On Linezolid and meropenum      • PVD (peripheral vascular disease)    • SIRS (systemic inflammatory response syndrome) 01/09/2022    Admission  - WBC 17.78   -   - RR 16 - 1/10: VSS/wnl - CXR - Mild pulm edema - Blood cultures no growth at 24 hours  - Procalcitonin 0.29 - UA : glucose 1000, negative Leucocytes/nitrate - Empiric Zosyn and Vancomcyin    • Sleep apnea    • Substance abuse    • Vitamin D deficiency      Past Surgical History:   Procedure Laterality Date   • AMPUTATION DIGIT Right 2/27/2023    Procedure: Excision of right first metatarsal head, right second toe amputation;  Surgeon: Jean Oliveira DPM;  Location: [facility];  Service: Podiatry;  Laterality: Right;   • BELOW KNEE AMPUTATION Left 12/16/2022    Procedure: AMPUTATION BELOW KNEE, LEFT;  Surgeon: Huy White MD;  Location: [facility] OR;  Service: Orthopedics;  Laterality: Left;   • CARDIAC CATHETERIZATION N/A 07/14/2020   • CARDIAC CATHETERIZATION N/A 04/23/2021    Procedure: Left Heart Cath;  Surgeon: Melba Romo MD;  Location: [facility] CATH INVASIVE LOCATION;  Service: Cardiology;  Laterality: N/A;   • CARDIAC CATHETERIZATION N/A 04/30/2021    Procedure: Percutaneous Coronary Intervention;  Surgeon: Russell Voss MD;  Location: [facility] CATH INVASIVE LOCATION;  Service: Cardiovascular;  Laterality: N/A;   • CARDIAC CATHETERIZATION N/A 04/30/2021    Procedure: Stent NIKKI coronary;  Surgeon: Russell Voss MD;  Location: [facility] CATH INVASIVE LOCATION;  Service: Cardiovascular;  Laterality: N/A;   • CARDIAC CATHETERIZATION Left 11/13/2021    Procedure: Left Heart  Cath;  Surgeon: Niall Rios MD;  Location: [facility] CATH INVASIVE LOCATION;  Service: Cardiology;  Laterality: Left;   • CAROTID STENT Left    • COLONOSCOPY     • COLONOSCOPY N/A 05/14/2021    Procedure: COLONOSCOPY;  Surgeon: Mingo Duarte MD;  Location: [facility] ENDOSCOPY;  Service: Gastroenterology;  Laterality: N/A;   • CORONARY ARTERY BYPASS GRAFT N/A 2013    CABG X 3   • CYSTOSCOPY BLADDER STONE LITHOTRIPSY Bilateral    • ENDOSCOPY N/A 04/12/2021    Procedure: ESOPHAGOGASTRODUODENOSCOPY;  Surgeon: Mingo Duarte MD;  Location: [facility] ENDOSCOPY;  Service: Gastroenterology;  Laterality: N/A;   • ENDOSCOPY N/A 05/14/2021    Procedure: ESOPHAGOGASTRODUODENOSCOPY;  Surgeon: Mingo Duarte MD;  Location: [facility] ENDOSCOPY;  Service: Gastroenterology;  Laterality: N/A;   • ENDOSCOPY N/A 01/28/2022    Procedure: ESOPHAGOGASTRODUODENOSCOPY;  Surgeon: Mingo Duarte MD;  Location: [facility] ENDOSCOPY;  Service: Gastroenterology;  Laterality: N/A;   • HYSTERECTOMY     • INTERVENTIONAL RADIOLOGY PROCEDURE N/A 10/21/2021    Procedure: tunneled central venous catheter placement;  Surgeon: Donnie Robles MD;  Location: [facility] ANGIO INVASIVE LOCATION;  Service: Interventional Radiology;  Laterality: N/A;   • INTERVENTIONAL RADIOLOGY PROCEDURE N/A 01/27/2022    Procedure: tunneled central venous catheter placement;  Surgeon: Donnie Robles MD;  Location: [facility] ANGIO INVASIVE LOCATION;  Service: Interventional Radiology;  Laterality: N/A;   • LAPAROSCOPIC TUBAL LIGATION     • TUNNELED VENOUS CATHETER PLACEMENT       Social History     Socioeconomic History   • Marital status: Legally      Spouse name: wesley dumont   Tobacco Use   • Smoking status: Every Day     Packs/day: 0.25     Years: 46.00     Pack years: 11.50     Types: Cigarettes     Start date: 2/1/1999   • Smokeless tobacco: Never   Vaping Use   • Vaping Use: Never used   Substance and Sexual Activity   • Alcohol use: No   •  Drug use: Not Currently     Types: LSD, Marijuana, Methamphetamines   • Sexual activity: Defer       Allergies:  Allergies   Allergen Reactions   • Adhesive Tape Hives, Other (See Comments) and Rash   • Nsaids Hives   • Latex Other (See Comments) and Hives   • Other Itching     Bandaids, MRSA, SURECLOSE   • Wound Dressing Adhesive Hives and Rash       Medications:    Current Facility-Administered Medications:   •  acetaminophen (TYLENOL) tablet 650 mg, 650 mg, Oral, Q8H PRN, Jef Ramires MD, 650 mg at 05/12/23 1100  •  albuterol (PROVENTIL) nebulizer solution 0.083% 2.5 mg/3mL, 2.5 mg, Nebulization, Q4H PRN, Jef Ramires MD  •  atorvastatin (LIPITOR) tablet 40 mg, 40 mg, Oral, Daily, Jef Ramires MD, 40 mg at 05/15/23 0817  •  carvedilol (COREG) tablet 6.25 mg, 6.25 mg, Oral, BID With Meals, Jef Ramires MD, 6.25 mg at 05/15/23 0817  •  cetirizine (zyrTEC) tablet 5 mg, 5 mg, Oral, Daily, Jef Ramires MD, 5 mg at 05/15/23 0818  •  clopidogrel (PLAVIX) tablet 75 mg, 75 mg, Oral, Daily, Jef Ramires MD, 75 mg at 05/15/23 0818  •  cyclobenzaprine (FLEXERIL) tablet 5 mg, 5 mg, Oral, BID, Jef Ramires MD, 5 mg at 05/15/23 0818  •  dextrose (D50W) (25 g/50 mL) IV injection 25 g, 25 g, Intravenous, Q15 Min PRN, Jef Ramires MD  •  dextrose (GLUTOSE) oral gel 15 g, 15 g, Oral, Q15 Min PRN, Jef Ramires MD  •  docusate sodium (COLACE) capsule 100 mg, 100 mg, Oral, BID, Jef Ramires MD, 100 mg at 05/15/23 0818  •  DULoxetine (CYMBALTA) DR capsule 20 mg, 20 mg, Oral, Daily, Jef Ramires MD, 20 mg at 05/15/23 0817  •  glucagon HCl (Diagnostic) injection 1 mg, 1 mg, Intramuscular, Q15 Min PRN, Jef Ramires MD  •  heparin (porcine) 5000 UNIT/ML injection 5,000 Units, 5,000 Units, Subcutaneous, Q12H, Jef Ramires MD, 5,000 Units at 05/15/23 0818  •  heparin (porcine) injection 2,000 Units, 2,000 Units, Intracatheter, PRN, Jef Ramires MD, 4,000  Units at 05/11/23 1557  •  heparin (porcine) injection 4,000 Units, 4,000 Units, Intracatheter, PRN, Ace Lauren MD, 4,000 Units at 05/13/23 1335  •  hydrOXYzine (ATARAX) tablet 25 mg, 25 mg, Oral, Q8H PRN, Jef Ramires MD, 25 mg at 05/15/23 0828  •  Insulin Aspart (novoLOG) injection 0-9 Units, 0-9 Units, Subcutaneous, TID AC, Jef Ramires MD, 2 Units at 05/15/23 0821  •  Insulin Aspart (novoLOG) injection 14 Units, 14 Units, Subcutaneous, TID With Meals, Jef Ramires MD, 14 Units at 05/15/23 0818  •  insulin detemir (LEVEMIR) injection 24 Units, 24 Units, Subcutaneous, Nightly, Jef Ramires MD, 24 Units at 05/14/23 2146  •  ipratropium-albuterol (DUO-NEB) nebulizer solution 3 mL, 3 mL, Nebulization, 4x Daily PRN, Jef Ramires MD  •  lidocaine-prilocaine (EMLA) 2.5-2.5 % cream, , Topical, PRN, Ace Lauren MD, Given at 05/13/23 1217  •  losartan (COZAAR) tablet 25 mg, 25 mg, Oral, Daily, Jef Ramires MD, 25 mg at 05/15/23 0546  •  memantine (NAMENDA) tablet 5 mg, 5 mg, Oral, BID, Jef Ramires MD, 5 mg at 05/15/23 0817  •  meropenem (MERREM) 500mg/100 mL 0.9% NS IVPB (mbp), 500 mg, Intravenous, Q24H, Jef Ramires MD, 500 mg at 05/14/23 1207  •  nitroglycerin (NITROSTAT) SL tablet 0.4 mg, 0.4 mg, Sublingual, Q5 Min PRN, Jef Ramires MD  •  nystatin (MYCOSTATIN) powder, , Topical, Q12H, Simeon Peña MD, Given at 05/15/23 0818  •  ondansetron (ZOFRAN) tablet 4 mg, 4 mg, Oral, Q6H PRN, Jef Ramires MD  •  oxyCODONE (ROXICODONE) immediate release tablet 10 mg, 10 mg, Oral, TID, Jef Ramires MD, 10 mg at 05/15/23 0818  •  pantoprazole (PROTONIX) EC tablet 40 mg, 40 mg, Oral, Nightly, Jef Ramires MD, 40 mg at 05/14/23 2146  •  Pharmacy to Dose meropenem (MERREM), , Does not apply, Continuous PRN, Jef Ramires MD  •  promethazine (PHENERGAN) tablet 25 mg, 25 mg, Oral, Q6H PRN, Jef Ramiers MD, 25 mg at 05/14/23 0849  •  sevelamer  (RENVELA) tablet 800 mg, 800 mg, Oral, TID With Meals, Jef Ramires MD, 800 mg at 05/15/23 0817  •  sodium chloride 0.9 % flush 10 mL, 10 mL, Intravenous, PRN, Jef Ramires MD  •  sodium chloride 0.9 % flush 10 mL, 10 mL, Intravenous, Q12H, Jef Ramires MD, 10 mL at 05/15/23 0821  •  sodium chloride 0.9 % flush 10 mL, 10 mL, Intravenous, PRN, Jef Ramires MD  •  sodium chloride 0.9 % infusion 40 mL, 40 mL, Intravenous, PRN, Jef Ramires MD        OBJECTIVE     Vitals:    05/15/23 0726   BP: 147/79   Pulse: 79   Resp: 18   Temp: 95.9 °F (35.5 °C)   SpO2: 95%       PHYSICAL EXAM  Physical Exam  Vitals reviewed. Nursing notes reviewed.  Constitutional:       Appearance: Well-developed.     Musculoskeletal:         Cervical back: Normal range of motion and neck supple.   Skin:     General: Skin is warm and dry.      Coloration: Skin is not pale.      Findings: No erythema or rash. Wound to right great toe.  Neurological:      Mental Status: Pt is alert and oriented to person, place, and time.   Psychiatric:         Behavior: Behavior normal.         Thought Content: Thought content normal.         Judgment: Judgment normal.       Results Review:  Lab Results (last 48 hours)     Procedure Component Value Units Date/Time    POC Glucose Once [[id number]]  (Abnormal) Collected: 05/15/23 1024    Specimen: Blood Updated: 05/15/23 1045     Glucose 225 mg/dL      Comment: RN NotifiedOperator: [id number] JUAQUIN GONGORABanner Cardon Children's Medical CenterMeter ID: [id number]       POC Glucose Once [[id number]]  (Abnormal) Collected: 05/15/23 0610    Specimen: Blood Updated: 05/15/23 0638     Glucose 192 mg/dL      Comment: RN NotifiedOperator: [id number] MERYL Alion EnergyMeter ID: [id number]       POC Glucose Once [[id number]]  (Abnormal) Collected: 05/14/23 2028    Specimen: Blood Updated: 05/14/23 2055     Glucose 185 mg/dL      Comment: RN NotifiedOperator: [id number] MERYL [employer] EnergyMeter ID: [id number]       POC Glucose Once  [[id number]]  (Abnormal) Collected: 05/14/23 1611    Specimen: Blood Updated: 05/14/23 1636     Glucose 154 mg/dL      Comment: RN NotifiedOperator: [id number] KEVIN BAHENAYMeter ID: [id number]       POC Glucose Once [[id number]]  (Abnormal) Collected: 05/14/23 1053    Specimen: Blood Updated: 05/14/23 1119     Glucose 303 mg/dL      Comment: Result Not ConfirmedOperator: [id number] KEVIN BAHENAYMeter ID: [id number]       Basic Metabolic Panel [[id number]]  (Abnormal) Collected: 05/14/23 0552    Specimen: Blood Updated: 05/14/23 0658     Glucose 157 mg/dL      BUN 36 mg/dL      Creatinine 4.14 mg/dL      Sodium 133 mmol/L      Potassium 4.2 mmol/L      Chloride 91 mmol/L      CO2 25.0 mmol/L      Calcium 10.1 mg/dL      BUN/Creatinine Ratio 8.7     Anion Gap 17.0 mmol/L      eGFR 11.5 mL/min/1.73      Comment: <15 Indicative of kidney failure       Narrative:      GFR Normal >60  Chronic Kidney Disease <60  Kidney Failure <15      POC Glucose Once [[id number]]  (Abnormal) Collected: 05/14/23 0546    Specimen: Blood Updated: 05/14/23 0649     Glucose 154 mg/dL      Comment: RN NotifiedOperator: [id number] PATRICIA Pratt ID: [id number]       CBC & Differential [[id number]]  (Abnormal) Collected: 05/14/23 0552    Specimen: Blood Updated: 05/14/23 0630    Narrative:      The following orders were created for panel order CBC & Differential.  Procedure                               Abnormality         Status                     ---------                               -----------         ------                     CBC Auto Differential[[id number]]        Abnormal            Final result                 Please view results for these tests on the individual orders.    CBC Auto Differential [[id number]]  (Abnormal) Collected: 05/14/23 0552    Specimen: Blood Updated: 05/14/23 0630     WBC 7.83 10*3/mm3      RBC 3.98 10*6/mm3      Hemoglobin 11.5 g/dL      Hematocrit 35.9 %      MCV 90.2 fL      MCH 28.9 pg      MCHC  32.0 g/dL      RDW 14.5 %      RDW-SD 47.7 fl      MPV 8.7 fL      Platelets 238 10*3/mm3      Neutrophil % 52.6 %      Lymphocyte % 30.8 %      Monocyte % 10.1 %      Eosinophil % 4.0 %      Basophil % 1.1 %      Immature Grans % 1.4 %      Neutrophils, Absolute 4.12 10*3/mm3      Lymphocytes, Absolute 2.41 10*3/mm3      Monocytes, Absolute 0.79 10*3/mm3      Eosinophils, Absolute 0.31 10*3/mm3      Basophils, Absolute 0.09 10*3/mm3      Immature Grans, Absolute 0.11 10*3/mm3      nRBC 0.0 /100 WBC     POC Glucose Once [[id number]]  (Normal) Collected: 05/13/23 2016    Specimen: Blood Updated: 05/13/23 2112     Glucose 121 mg/dL      Comment: RN NotifiedOperator: [id number] PATRICIA [employer]Meter ID: [id number]       POC Glucose Once [[id number]]  (Normal) Collected: 05/13/23 1623    Specimen: Blood Updated: 05/13/23 1649     Glucose 112 mg/dL      Comment: RN NotifiedOperator: [id number] KEVIN Voice AssistYMeter ID: [id number]       POC Glucose Once [[id number]]  (Abnormal) Collected: 05/13/23 0558    Specimen: Blood Updated: 05/13/23 1319     Glucose 131 mg/dL      Comment: RN NotifiedOperator: [id number] PATRICIA TrialReachNEYMeter ID: [id number]       POC Glucose Once [[id number]]  (Abnormal) Collected: 05/13/23 1035    Specimen: Blood Updated: 05/13/23 1140     Glucose 202 mg/dL      Comment: RN NotifiedOperator: [id number] KEVIN Voice AssistYMeter ID: [id number]           Imaging Results (Last 72 Hours)     ** No results found for the last 72 hours. **             ASSESSMENT/PLAN       Examination and evaluation of wound(s) was performed.    DIAGNOSIS:     Post op wound dehiscence, right foot  Diabetes Mellitus with foot ulcer    PLAN:     Woundvac changed. Black foam placed and bridged to top of foot. No bone or tendon exposure. Will change on Wednesday. Call with questions.    Discussed findings and treatment plan including risks, benefits, and treatment options with patient in detail. Patient agreed with treatment  plan.          This document has been electronically signed by BRIDGETT Corbett on May 15, 2023 10:58 CDT

## 2023-05-15 NOTE — PROGRESS NOTES
[facility] Medicine Services  INPATIENT PROGRESS NOTE    Length of Stay: 3  Date of Admission: 5/11/2023  Primary Care Physician: Kiersten Wilson APRN    Subjective   Chief Complaint: Confusion  HPI: States she is feeling okay today.  Still gets somewhat confused    As of today, 05/15/23  Review of Systems   Constitutional: Negative for appetite change, chills, fatigue, fever and unexpected weight change.   Respiratory: Negative for cough, choking, chest tightness, shortness of breath and wheezing.    Cardiovascular: Negative for chest pain, palpitations and leg swelling.   Gastrointestinal: Negative for abdominal pain, blood in stool, constipation, diarrhea, nausea and vomiting.   Genitourinary: Negative for dysuria, flank pain and hematuria.   Musculoskeletal: Positive for back pain and neck pain.   Neurological: Negative for dizziness, seizures, syncope, speech difficulty, weakness, light-headedness, numbness and headaches.   Hematological: Does not bruise/bleed easily.        All pertinent negatives and positives are as above. All other systems have been reviewed and are negative unless otherwise stated.    Objective    Temp:  [95.9 °F (35.5 °C)-97.8 °F (36.6 °C)] 97.6 °F (36.4 °C)  Heart Rate:  [70-85] 75  Resp:  [18] 18  BP: (106-184)/(53-84) 124/60    AM-PAC 6 Clicks Score (PT): 12 (05/15/23 0817)    As of today, 05/15/23  Physical Exam  Vitals reviewed.   Constitutional:       Appearance: She is well-developed.   HENT:      Head: Normocephalic and atraumatic.   Eyes:      Pupils: Pupils are equal, round, and reactive to light.   Cardiovascular:      Rate and Rhythm: Normal rate and regular rhythm.      Heart sounds: Normal heart sounds. No murmur heard.    No friction rub. No gallop.   Pulmonary:      Effort: Pulmonary effort is normal. No respiratory distress.      Breath sounds: Normal breath sounds. No wheezing or rales.   Chest:      Chest wall:  No tenderness.   Abdominal:      General: Bowel sounds are normal. There is no distension.      Palpations: Abdomen is soft.      Tenderness: There is no abdominal tenderness. There is no guarding.   Musculoskeletal:      Cervical back: Normal range of motion and neck supple.      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   Neurological:      Comments: More oriented today.     Psychiatric:         Behavior: Behavior normal.         Thought Content: Thought content normal.           Results Review:  I have reviewed the labs, radiology results, and diagnostic studies.    Laboratory Data:   Results from last 7 days   Lab Units 05/14/23  0552 05/13/23  0616 05/12/23 0734 05/11/23  1038   SODIUM mmol/L 133* 135* 134* 139   POTASSIUM mmol/L 4.2 5.2 4.9 6.5*   CHLORIDE mmol/L 91* 99 96* 104   CO2 mmol/L 25.0 23.0 24.0 19.0*   BUN mg/dL 36* 62* 48* 85*   CREATININE mg/dL 4.14* 5.78* 4.52* 7.00*   GLUCOSE mg/dL 157* 121* 86 60*   CALCIUM mg/dL 10.1 9.8 9.7 9.4   BILIRUBIN mg/dL  --   --   --  0.4   ALK PHOS U/L  --   --   --  282*   ALT (SGPT) U/L  --   --   --  13   AST (SGOT) U/L  --   --   --  17   ANION GAP mmol/L 17.0* 13.0 14.0 16.0*     Estimated Creatinine Clearance: 16 mL/min (A) (by C-G formula based on SCr of 4.14 mg/dL (H)).  Results from last 7 days   Lab Units 05/11/23  1038   MAGNESIUM mg/dL 2.1         Results from last 7 days   Lab Units 05/14/23  0552 05/13/23  0616 05/12/23 0734 05/11/23  1038   WBC 10*3/mm3 7.83 6.59 6.04 6.21   HEMOGLOBIN g/dL 11.5* 9.7* 10.3* 8.7*   HEMATOCRIT % 35.9 30.1* 32.7* 28.7*   PLATELETS 10*3/mm3 238 208 233 187           Culture Data:   No results found for: BLOODCX  No results found for: URINECX  No results found for: RESPCX  No results found for: WOUNDCX  No results found for: STOOLCX  No components found for: BODYFLD    Radiology Data:   Imaging Results (Last 24 Hours)     ** No results found for the  last 24 hours. **          I have utilized all available immediate resources to obtain, update, or review the patient's current medications (including all prescriptions, over-the-counter products, herbals, cannabis/cannabidiol products, and vitamin/mineral/dietary (nutritional) supplements).       Assessment/Plan     Active Hospital Problems    Diagnosis    • **ESRD (end stage renal disease) on dialysis        Plan:    1.  Altered mental status: Most likely multifactorial.  Likely secondary to inflammation of urinary tract infection and uremia.  Intermittent confusion.  States she is still somewhat forgetful.    2.  End-stage renal disease: Appreciate nephrology help.  Dialyzed Tuesday, Thursday, and Saturday.  3.  Hyperkalemia: Management with hemodialysis.  4.  Coronary artery disease: Continue home medication.  5.  Urinary tract infection: Urine culture shows ESBL E. coli.  Continue meropenem.  Attempting to arrange LTAC for continuation of antibiotics.  6.  Diabetes mellitus: Long-acting insulin and sliding scale insulin.  7.  Morbid obesity  8.  DVT prophylaxis: Heparin.    Medical Decision Making  Number and Complexity of problems: 2 highly complex medical problems    Conditions and Status:        Condition is improving.     [facility] Data  External documents reviewed: Primary care and subspecialty notes  My EKG interpretation: Normal sinus rhythm  My plain film interpretation: No acute cardiopulmonary disease     Discussed with: Patient     Treatment Plan  As above    Care Planning  Shared decision making: Patient in agreement with plan of care  Code status and discussions: Full code    Disposition  Social Determinants of Health that impact treatment or disposition: None  I expect the patient to be discharged to skilled facility in 2-3 days.       The patient was evaluated during the global COVID-19 pandemic, and the diagnosis was suspected/considered upon their initial presentation.  Evaluation, treatment, and  testing were consistent with current guidelines for patients who present with complaints or symptoms that may be related to COVID-19.    I confirmed that the patient's Advance Care Plan is present, code status is documented, or surrogate decision maker is listed in the patient's medical record.        This document has been electronically signed by Jef Ramires MD on May 15, 2023 11:17 CDT

## 2023-05-15 NOTE — PROGRESS NOTES
"  [facility]  [address] [place] [address]. [address]  T - [phone]  F - [phone]     Progress Note          PATIENT  DEMOGRAPHICS   PATIENT NAME: Yamileth Slater                      PHYSICIAN: Ace Lauren MD  : 1960  MRN: [id number]   LOS: 3 days    Patient Care Team:  Kiersten Wilson APRN as PCP - General (Family Medicine)  Subjective   SUBJECTIVE     More alert. No soa , getting wound vac change today         Objective   OBJECTIVE   Vital Signs  Temp:  [95.9 °F (35.5 °C)-98 °F (36.7 °C)] 95.9 °F (35.5 °C)  Heart Rate:  [70-85] 79  Resp:  [18] 18  BP: (106-184)/(53-84) 147/79    Flowsheet Rows    Flowsheet Row First Filed Value   Admission Height 157.5 cm (62\") Documented at 2023 1051   Admission Weight 111 kg (245 lb) Documented at 2023 1051           I/O last 3 completed shifts:  In: 1200 [P.O.:1200]  Out: 1350 [Urine:1350]    PHYSICAL EXAM    Physical Exam  Constitutional:       Appearance: She is well-developed.   HENT:      Head: Normocephalic.   Eyes:      Pupils: Pupils are equal, round, and reactive to light.   Cardiovascular:      Rate and Rhythm: Normal rate and regular rhythm.      Heart sounds: Normal heart sounds.   Pulmonary:      Effort: Pulmonary effort is normal.      Breath sounds: Normal breath sounds.   Abdominal:      General: Bowel sounds are normal.      Palpations: Abdomen is soft.   Musculoskeletal:         General: No swelling.   Skin:     Coloration: Skin is not jaundiced.   Neurological:      General: No focal deficit present.      Mental Status: She is alert and oriented to person, place, and time.         RESULTS   Results Review:    Results from last 7 days   Lab Units 23  0552 23  0616 23  0734 23  1038   SODIUM mmol/L 133* 135* 134* 139   POTASSIUM mmol/L 4.2 5.2 4.9 6.5*   CHLORIDE mmol/L 91* 99 96* 104   CO2 mmol/L 25.0 23.0 24.0 19.0*   BUN mg/dL 36* 62* 48* 85*   CREATININE mg/dL 4.14* 5.78* " 4.52* 7.00*   CALCIUM mg/dL 10.1 9.8 9.7 9.4   BILIRUBIN mg/dL  --   --   --  0.4   ALK PHOS U/L  --   --   --  282*   ALT (SGPT) U/L  --   --   --  13   AST (SGOT) U/L  --   --   --  17   GLUCOSE mg/dL 157* 121* 86 60*       Estimated Creatinine Clearance: 16 mL/min (A) (by C-G formula based on SCr of 4.14 mg/dL (H)).    Results from last 7 days   Lab Units 05/11/23  1038   MAGNESIUM mg/dL 2.1             Results from last 7 days   Lab Units 05/14/23  0552 05/13/23  0616 05/12/23  0734 05/11/23  1038   WBC 10*3/mm3 7.83 6.59 6.04 6.21   HEMOGLOBIN g/dL 11.5* 9.7* 10.3* 8.7*   PLATELETS 10*3/mm3 238 208 233 187               Imaging Results (Last 24 Hours)     ** No results found for the last 24 hours. **           MEDICATIONS    atorvastatin, 40 mg, Oral, Daily  carvedilol, 6.25 mg, Oral, BID With Meals  cetirizine, 5 mg, Oral, Daily  clopidogrel, 75 mg, Oral, Daily  cyclobenzaprine, 5 mg, Oral, BID  docusate sodium, 100 mg, Oral, BID  DULoxetine, 20 mg, Oral, Daily  heparin (porcine), 5,000 Units, Subcutaneous, Q12H  Insulin Aspart, 0-9 Units, Subcutaneous, TID AC  insulin aspart, 14 Units, Subcutaneous, TID With Meals  insulin detemir, 24 Units, Subcutaneous, Nightly  losartan, 25 mg, Oral, Daily  memantine, 5 mg, Oral, BID  meropenem, 500 mg, Intravenous, Q24H  nystatin, , Topical, Q12H  oxyCODONE, 10 mg, Oral, TID  pantoprazole, 40 mg, Oral, Nightly  sevelamer, 800 mg, Oral, TID With Meals  sodium chloride, 10 mL, Intravenous, Q12H      Pharmacy to Dose meropenem (MERREM),         Assessment & Plan   ASSESSMENT / PLAN      ESRD (end stage renal disease) on dialysis    1.  ESRD on hemodialysis- normally dialyzes TTS at [facility] in [place].  Using avf or tunnel cath these days.    continue HD TTS. Fistula was used on saturday     2.  Hyperkalemia- now better     3.  Anemia of CKD hgb stable     4.  CAD     5.  DM2     6.  CKD-MBD     7.  Hypertension     8. uti on rocephin             This document has been  electronically signed by Ace Lauren MD on May 15, 2023 10:09 CDT

## 2023-05-15 NOTE — PAYOR COMM NOTE
"Augustus [place] RN [facility]  [phone]      Phone  [phone]        Fax  Cont. Stay REview    Yamileth Slater (63 y.o. Female)     Date of Birth   1960    Social Security Number       Address   [address] [place] [address]    Home Phone   [phone]    MRN   [id number]       Christian   None    Marital Status   Legally                             Admission Date   5/11/23    Admission Type   Emergency    Admitting Provider   Jef Ramires MD    Attending Provider   Jef Ramires MD    Department, Room/Bed   [address], 315/1       Discharge Date       Discharge Disposition       Discharge Destination                               Attending Provider: Jef Ramires MD    Allergies: Adhesive Tape, Nsaids, Latex, Other, Wound Dressing Adhesive    Isolation: Contact   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), 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)   Code Status: CPR    Ht: 157.5 cm (62\")   Wt: 109 kg (240 lb 9.6 oz)    Admission Cmt: None   Principal Problem: ESRD (end stage renal disease) on dialysis [N18.6,Z99.2]                 Active Insurance as of 5/11/2023     Primary Coverage     Payor Plan Insurance Group Employer/Plan Group    ANTHEM MEDICARE REPLACEMENT ANTHEM MEDICARE ADVANTAGE [id number]     Payor Plan Address Payor Plan Phone Number Payor Plan Fax Number Effective Dates    [address] [phone]  1/1/2022 - None Entered    [facility] [id number]       Subscriber Name Subscriber Birth Date Member ID       YAMILETH SLATER 1960 [id number]           Secondary Coverage     Payor Plan Insurance Group Employer/Plan Group    [place]      Payor Plan Address Payor Plan Phone Number Payor Plan Fax Number Effective Dates    [address] [phone]  6/28/2019 - None Entered    [facility] [id number]       Subscriber Name Subscriber Birth Date Member ID       " YAMILETH FINK 1960 [id number]                 Emergency Contacts      (Rel.) Home Phone Work Phone Mobile Phone    RAMONA KHAN (Sister) -- -- [phone]    Yamileth Chavez (Daughter) [phone] -- [phone]    Suzanne Rivera (Daughter) [phone] -- [phone]            Vital Signs (last day)     Date/Time Temp Temp src Pulse Resp BP Patient Position SpO2    05/15/23 1102 97.6 (36.4) Temporal 75 18 124/60 Sitting 94    05/15/23 0726 95.9 (35.5) Temporal 79 18 147/79 Lying 95    05/15/23 0723 -- -- 77 -- -- -- --    05/15/23 0406 -- -- -- -- 174/78 Lying --    05/15/23 0344 97.4 (36.3) Temporal 71 18 184/84 Lying 94    05/15/23 0043 -- -- 76 -- -- -- --    05/14/23 2350 97.7 (36.5) Temporal 85 18 152/76 Lying 94    05/14/23 2025 97 (36.1) Temporal 77 18 159/70 Lying 92    05/14/23 1640 -- -- 71 -- -- -- --    05/14/23 1610 97.8 (36.6) Temporal 70 18 106/53 Lying 95    05/14/23 1052 98 (36.7) Temporal 72 18 135/60 Lying 92    05/14/23 0720 97.2 (36.2) Temporal 78 18 124/79 Lying 91    05/14/23 0605 -- -- -- -- 145/62 Lying --    05/14/23 0417 97 (36.1) Temporal 75 18 170/88 Sitting 97    05/14/23 0155 -- -- 80 -- -- -- --          Oxygen Therapy (last day)     Date/Time SpO2 Device (Oxygen Therapy) Flow (L/min) Oxygen Concentration (%) ETCO2 (mmHg)    05/15/23 1102 94 nasal cannula -- -- --    05/15/23 0726 95 CPAP -- -- --    05/15/23 0344 94 room air -- -- --    05/14/23 2350 94 room air -- -- --    05/14/23 2025 92 room air -- -- --    05/14/23 1610 95 nasal cannula 2 -- --    05/14/23 1052 92 nasal cannula 2 -- --    05/14/23 0900 -- nasal cannula 2 -- --    05/14/23 0720 91 nasal cannula 2 -- --    05/14/23 0417 97 CPAP -- -- --          Current Facility-Administered Medications   Medication Dose Route Frequency Provider Last Rate Last Admin   • acetaminophen (TYLENOL) tablet 650 mg  650 mg Oral Q8H PRN Jef Ramires MD   650 mg at 05/12/23 1100   • albuterol (PROVENTIL)  nebulizer solution 0.083% 2.5 mg/3mL  2.5 mg Nebulization Q4H PRN Jef Ramires MD       • atorvastatin (LIPITOR) tablet 40 mg  40 mg Oral Daily Jef Ramires MD   40 mg at 05/15/23 0817   • carvedilol (COREG) tablet 6.25 mg  6.25 mg Oral BID With Meals Jef Ramires MD   6.25 mg at 05/15/23 0817   • cetirizine (zyrTEC) tablet 5 mg  5 mg Oral Daily Jef Ramires MD   5 mg at 05/15/23 0818   • clopidogrel (PLAVIX) tablet 75 mg  75 mg Oral Daily Jef Ramires MD   75 mg at 05/15/23 0818   • cyclobenzaprine (FLEXERIL) tablet 5 mg  5 mg Oral BID Jef Ramires MD   5 mg at 05/15/23 0818   • dextrose (D50W) (25 g/50 mL) IV injection 25 g  25 g Intravenous Q15 Min PRN Jef Ramires MD       • dextrose (GLUTOSE) oral gel 15 g  15 g Oral Q15 Min PRN Jef Ramires MD       • docusate sodium (COLACE) capsule 100 mg  100 mg Oral BID Jef Ramires MD   100 mg at 05/15/23 0818   • DULoxetine (CYMBALTA) DR capsule 20 mg  20 mg Oral Daily Jef Ramires MD   20 mg at 05/15/23 0817   • glucagon HCl (Diagnostic) injection 1 mg  1 mg Intramuscular Q15 Min PRN Jef Ramires MD       • heparin (porcine) 5000 UNIT/ML injection 5,000 Units  5,000 Units Subcutaneous Q12H Jef Ramires MD   5,000 Units at 05/15/23 0818   • heparin (porcine) injection 2,000 Units  2,000 Units Intracatheter PRN Jef Ramires MD   4,000 Units at 05/11/23 1557   • heparin (porcine) injection 4,000 Units  4,000 Units Intracatheter PRN Ace Lauren MD   4,000 Units at 05/13/23 1335   • hydrOXYzine (ATARAX) tablet 25 mg  25 mg Oral Q8H PRN Jef Ramires MD   25 mg at 05/15/23 0828   • Insulin Aspart (novoLOG) injection 0-9 Units  0-9 Units Subcutaneous TID AC Jef Ramires MD   2 Units at 05/15/23 0821   • Insulin Aspart (novoLOG) injection 14 Units  14 Units Subcutaneous TID With Meals Jef Ramires MD   14 Units at 05/15/23 0818   • insulin detemir (LEVEMIR) injection 24 Units  24  Units Subcutaneous Nightly Jef Ramires MD   24 Units at 05/14/23 2146   • ipratropium-albuterol (DUO-NEB) nebulizer solution 3 mL  3 mL Nebulization 4x Daily PRN Jef Ramires MD       • lidocaine-prilocaine (EMLA) 2.5-2.5 % cream   Topical PRN Ace Lauren MD   Given at 05/13/23 1217   • losartan (COZAAR) tablet 25 mg  25 mg Oral Daily Jef Ramires MD   25 mg at 05/15/23 0546   • memantine (NAMENDA) tablet 5 mg  5 mg Oral BID Jef Ramires MD   5 mg at 05/15/23 0817   • meropenem (MERREM) 500mg/100 mL 0.9% NS IVPB (mbp)  500 mg Intravenous Q24H Jef Ramires MD   500 mg at 05/14/23 1207   • nitroglycerin (NITROSTAT) SL tablet 0.4 mg  0.4 mg Sublingual Q5 Min PRN Jef Ramires MD       • nystatin (MYCOSTATIN) powder   Topical Q12H Simeon Peña MD   Given at 05/15/23 0818   • ondansetron (ZOFRAN) tablet 4 mg  4 mg Oral Q6H PRN Jef Ramires MD       • oxyCODONE (ROXICODONE) immediate release tablet 10 mg  10 mg Oral TID Jef Ramires MD   10 mg at 05/15/23 0818   • pantoprazole (PROTONIX) EC tablet 40 mg  40 mg Oral Nightly Jef Ramires MD   40 mg at 05/14/23 2146   • Pharmacy to Dose meropenem (MERREM)   Does not apply Continuous PRN Jef Ramires MD       • promethazine (PHENERGAN) tablet 25 mg  25 mg Oral Q6H PRN Jef Ramires MD   25 mg at 05/14/23 0849   • sevelamer (RENVELA) tablet 800 mg  800 mg Oral TID With Meals Jef Ramires MD   800 mg at 05/15/23 0817   • sodium chloride 0.9 % flush 10 mL  10 mL Intravenous PRN Jef Ramires MD       • sodium chloride 0.9 % flush 10 mL  10 mL Intravenous Q12H Jef Ramires MD   10 mL at 05/15/23 0821   • sodium chloride 0.9 % flush 10 mL  10 mL Intravenous PRN Jef Ramires MD       • sodium chloride 0.9 % infusion 40 mL  40 mL Intravenous PRN Jef Ramires MD            Physician Progress Notes (last 48 hours)      Raegan Jonas, BRIDGETT at 05/15/23 1059               [facility]  INPATIENT WOUND & OSTOMY CARE    PROGRESS NOTE    Today's Date: 05/15/23    Patient Name: Yamileth Slater  MRN: [id number]  CSN: [id number]  PCP: Kiersten Wilson APRN  Referring Provider: Jef Ramires MD  Attending Provider: Jef Ramires MD  Length of Stay: 3    SUBJECTIVE   Chief Complaint: left foot wound    HPI: Yamileth Slater is a 63 year old female known to podiatry and wound care center. Has wound to right great toe from amputation. Patient has been seen in wound center for this with woundvac placed. Wound measures 0.6 cm x 0.5 cm x 1 cm. Wound is on left great toe.      Visit Dx:    ICD-10-CM ICD-9-CM   1. ESRD (end stage renal disease) on dialysis  N18.6 585.6    Z99.2 V45.11   2. Hyperkalemia  E87.5 276.7   3. Urinary tract infection in female  N39.0 599.0       Hospital Problem List:     ESRD (end stage renal disease) on dialysis      History:   Past Medical History:   Diagnosis Date   • Acute blood loss anemia 04/16/2017    Likely due to gastric oozing at this time. - Dr. Duarte (GI) was consulted and has now signed off, will follow up outpatient - pill colonoscopy showed AVMs - continue to monitor   • Acute cystitis with hematuria 03/31/2021 1/13: IV Rocephin 1 gm q 24 1/14 : transitioned  to omnicef 300mg. Urine cultures resulted and did not show growth. Omnicef discontinued as patient is asymptomatic   • Altered mental status 01/09/2022    - AMS on presentation - initial ABG pH 7.3, CO2 34 - Procal 0.29 - UA negative for acute cysitits -CTA head wnl  - Empiric Zosyn and Vancomycin -Lactate 2.5 on admission  - blood cultures no growth at 24 hours     • Anxiety    • CAD (coronary artery disease) 04/24/2021    S/P 3 stents 5/1/2021 for BHL Continue ASA 81mg & Clopidogrel 75mg Continue Atorvastatin 40mg   • Carotid artery stenosis    • CHF (congestive heart failure)    • Chronic obstructive lung disease    • CKD (chronic kidney disease)  stage 4, GFR 15-29 ml/min    • CKD (chronic kidney disease), symptom management only, stage 5 10/05/2020    Results from last 7 days Lab Units 12/15/21 0548 12/14/21 1323 12/14/21 0916 CREATININE mg/dL 3.92* 3.21* 3.32*  Baseline creatinine 2-3 GFR 13-25 GFR 15 Dialysis MWF, sees Dr. Lauren Nephrology consult,, appreciate recommendations Continue Bumex 1mg bid daily Holding Bumex 2mg 4 times a week   • Colonic polyp    • Coronary arteriosclerosis    • Diabetes mellitus    • Diabetic neuropathy    • Ear pain, right 10/18/2021    - canal trauma due to patient scratching and DMT2 - added cortisporin ear drops   • Elevated troponin 10/12/2021    -most likely from CKD -Trending down -Neg chest pain   • Generalized abdominal pain 07/01/2022    Could be due to initiation of tube feeds vs dyspepsia vs abdominal cramps related to no PO intake due to intubation vs constipation Continue current laxative regiment  If no bowel movement by this afternoon will consider enema   • GERD (gastroesophageal reflux disease)    • GI bleed 05/13/2021    - GI will follow up outpatient - Protonix 40mg daily - Avoid medical DVT prophy and use mechanical at this time instead. - Continue to monitor - pill colonoscopy results showed AVMs   • History of transfusion    • Hypercholesterolemia    • Hyperosmolar hyperglycemic state (HHS) 06/25/2022    Serum glucose 605 on admission  Anion gap 16 PH 7.37 Bicarb 27.9 Continue fluids  Insulin drip with HHS protocol  Anion gap closed around 10 AM, received one dose of Levamir subq, will stop insulin drip after 2 hrs     • Hypertension    • Hypokalemia 05/27/2022    Will replace as needed. Will be cautious in the setting of ESRD to avoid need for emergency dialysis   Monitor Qtc intervals on EKG     • Hypomagnesemia 06/27/2021    Monitor and replace   • Morbid obesity    • Nephrolithiasis    • On mechanically assisted ventilation 06/26/2022    Vent management and sedation orders placed.  - GEO'Supp  intensivist group consulted for vent management appreciate recommendations  - plan to extubate today     • Peripheral vascular disease    • Pleural effusion on right 06/26/2022    CXR on 6/30/22 read as a small upper left pulmonary edema vs early pneumonia.  Last Echo 1/2022 EF 61-65 % Continue to monitor  Procal slightly improved, CRP improved On Linezolid and meropenum      • PVD (peripheral vascular disease)    • SIRS (systemic inflammatory response syndrome) 01/09/2022    Admission  - WBC 17.78   -   - RR 16 - 1/10: VSS/wnl - CXR - Mild pulm edema - Blood cultures no growth at 24 hours  - Procalcitonin 0.29 - UA : glucose 1000, negative Leucocytes/nitrate - Empiric Zosyn and Vancomcyin    • Sleep apnea    • Substance abuse    • Vitamin D deficiency      Past Surgical History:   Procedure Laterality Date   • AMPUTATION DIGIT Right 2/27/2023    Procedure: Excision of right first metatarsal head, right second toe amputation;  Surgeon: Jean Oliveira DPM;  Location: [facility];  Service: Podiatry;  Laterality: Right;   • BELOW KNEE AMPUTATION Left 12/16/2022    Procedure: AMPUTATION BELOW KNEE, LEFT;  Surgeon: Huy White MD;  Location: [facility] OR;  Service: Orthopedics;  Laterality: Left;   • CARDIAC CATHETERIZATION N/A 07/14/2020   • CARDIAC CATHETERIZATION N/A 04/23/2021    Procedure: Left Heart Cath;  Surgeon: Melba Romo MD;  Location: [facility] CATH INVASIVE LOCATION;  Service: Cardiology;  Laterality: N/A;   • CARDIAC CATHETERIZATION N/A 04/30/2021    Procedure: Percutaneous Coronary Intervention;  Surgeon: Russell Voss MD;  Location: [facility] CATH INVASIVE LOCATION;  Service: Cardiovascular;  Laterality: N/A;   • CARDIAC CATHETERIZATION N/A 04/30/2021    Procedure: Stent NIKKI coronary;  Surgeon: Russell Voss MD;  Location: [facility] CATH INVASIVE LOCATION;  Service: Cardiovascular;  Laterality: N/A;   • CARDIAC CATHETERIZATION Left 11/13/2021    Procedure: Left Heart  Cath;  Surgeon: Niall Rios MD;  Location: [facility] CATH INVASIVE LOCATION;  Service: Cardiology;  Laterality: Left;   • CAROTID STENT Left    • COLONOSCOPY     • COLONOSCOPY N/A 05/14/2021    Procedure: COLONOSCOPY;  Surgeon: Mingo Duarte MD;  Location: [facility] ENDOSCOPY;  Service: Gastroenterology;  Laterality: N/A;   • CORONARY ARTERY BYPASS GRAFT N/A 2013    CABG X 3   • CYSTOSCOPY BLADDER STONE LITHOTRIPSY Bilateral    • ENDOSCOPY N/A 04/12/2021    Procedure: ESOPHAGOGASTRODUODENOSCOPY;  Surgeon: Mingo Duarte MD;  Location: [facility] ENDOSCOPY;  Service: Gastroenterology;  Laterality: N/A;   • ENDOSCOPY N/A 05/14/2021    Procedure: ESOPHAGOGASTRODUODENOSCOPY;  Surgeon: Mingo Duarte MD;  Location: [facility] ENDOSCOPY;  Service: Gastroenterology;  Laterality: N/A;   • ENDOSCOPY N/A 01/28/2022    Procedure: ESOPHAGOGASTRODUODENOSCOPY;  Surgeon: Mingo Duarte MD;  Location: [facility] ENDOSCOPY;  Service: Gastroenterology;  Laterality: N/A;   • HYSTERECTOMY     • INTERVENTIONAL RADIOLOGY PROCEDURE N/A 10/21/2021    Procedure: tunneled central venous catheter placement;  Surgeon: Donnie Robles MD;  Location: [facility] INVASIVE LOCATION;  Service: Interventional Radiology;  Laterality: N/A;   • INTERVENTIONAL RADIOLOGY PROCEDURE N/A 01/27/2022    Procedure: tunneled central venous catheter placement;  Surgeon: Donnie Robles MD;  Location: [facility] INVASIVE LOCATION;  Service: Interventional Radiology;  Laterality: N/A;   • LAPAROSCOPIC TUBAL LIGATION     • TUNNELED VENOUS CATHETER PLACEMENT       Social History     Socioeconomic History   • Marital status: Legally      Spouse name: wesley dumont   Tobacco Use   • Smoking status: Every Day     Packs/day: 0.25     Years: 46.00     Pack years: 11.50     Types: Cigarettes     Start date: 2/1/1999   • Smokeless tobacco: Never   Vaping Use   • Vaping Use: Never used   Substance and Sexual Activity   • Alcohol use: No   •  Drug use: Not Currently     Types: LSD, Marijuana, Methamphetamines   • Sexual activity: Defer       Allergies:  Allergies   Allergen Reactions   • Adhesive Tape Hives, Other (See Comments) and Rash   • Nsaids Hives   • Latex Other (See Comments) and Hives   • Other Itching     Bandaids, MRSA, SURECLOSE   • Wound Dressing Adhesive Hives and Rash       Medications:    Current Facility-Administered Medications:   •  acetaminophen (TYLENOL) tablet 650 mg, 650 mg, Oral, Q8H PRN, Jef Ramires MD, 650 mg at 05/12/23 1100  •  albuterol (PROVENTIL) nebulizer solution 0.083% 2.5 mg/3mL, 2.5 mg, Nebulization, Q4H PRN, Jef Ramires MD  •  atorvastatin (LIPITOR) tablet 40 mg, 40 mg, Oral, Daily, Jef Ramires MD, 40 mg at 05/15/23 0817  •  carvedilol (COREG) tablet 6.25 mg, 6.25 mg, Oral, BID With Meals, Jef Ramires MD, 6.25 mg at 05/15/23 0817  •  cetirizine (zyrTEC) tablet 5 mg, 5 mg, Oral, Daily, Jef Ramires MD, 5 mg at 05/15/23 0818  •  clopidogrel (PLAVIX) tablet 75 mg, 75 mg, Oral, Daily, Jef Ramires MD, 75 mg at 05/15/23 0818  •  cyclobenzaprine (FLEXERIL) tablet 5 mg, 5 mg, Oral, BID, Jef Ramires MD, 5 mg at 05/15/23 0818  •  dextrose (D50W) (25 g/50 mL) IV injection 25 g, 25 g, Intravenous, Q15 Min PRN, Jef Ramires MD  •  dextrose (GLUTOSE) oral gel 15 g, 15 g, Oral, Q15 Min PRN, Jef Ramires MD  •  docusate sodium (COLACE) capsule 100 mg, 100 mg, Oral, BID, Jef Ramires MD, 100 mg at 05/15/23 0818  •  DULoxetine (CYMBALTA) DR capsule 20 mg, 20 mg, Oral, Daily, Jef Ramires MD, 20 mg at 05/15/23 0817  •  glucagon HCl (Diagnostic) injection 1 mg, 1 mg, Intramuscular, Q15 Min PRN, Jef Ramires MD  •  heparin (porcine) 5000 UNIT/ML injection 5,000 Units, 5,000 Units, Subcutaneous, Q12H, Jef Ramires MD, 5,000 Units at 05/15/23 0818  •  heparin (porcine) injection 2,000 Units, 2,000 Units, Intracatheter, PRN, Jef Ramires MD, 4,000  Units at 05/11/23 1557  •  heparin (porcine) injection 4,000 Units, 4,000 Units, Intracatheter, PRN, Ace Lauren MD, 4,000 Units at 05/13/23 1335  •  hydrOXYzine (ATARAX) tablet 25 mg, 25 mg, Oral, Q8H PRN, Jef Ramires MD, 25 mg at 05/15/23 0828  •  Insulin Aspart (novoLOG) injection 0-9 Units, 0-9 Units, Subcutaneous, TID AC, Jef Ramires MD, 2 Units at 05/15/23 0821  •  Insulin Aspart (novoLOG) injection 14 Units, 14 Units, Subcutaneous, TID With Meals, Jef Ramires MD, 14 Units at 05/15/23 0818  •  insulin detemir (LEVEMIR) injection 24 Units, 24 Units, Subcutaneous, Nightly, Jef Ramires MD, 24 Units at 05/14/23 2146  •  ipratropium-albuterol (DUO-NEB) nebulizer solution 3 mL, 3 mL, Nebulization, 4x Daily PRN, Jef Ramires MD  •  lidocaine-prilocaine (EMLA) 2.5-2.5 % cream, , Topical, PRN, Ace Lauren MD, Given at 05/13/23 1217  •  losartan (COZAAR) tablet 25 mg, 25 mg, Oral, Daily, Jef Ramires MD, 25 mg at 05/15/23 0546  •  memantine (NAMENDA) tablet 5 mg, 5 mg, Oral, BID, Jef Ramires MD, 5 mg at 05/15/23 0817  •  meropenem (MERREM) 500mg/100 mL 0.9% NS IVPB (mbp), 500 mg, Intravenous, Q24H, Jef Ramires MD, 500 mg at 05/14/23 1207  •  nitroglycerin (NITROSTAT) SL tablet 0.4 mg, 0.4 mg, Sublingual, Q5 Min PRN, Jef Ramires MD  •  nystatin (MYCOSTATIN) powder, , Topical, Q12H, Simeon Peña MD, Given at 05/15/23 0818  •  ondansetron (ZOFRAN) tablet 4 mg, 4 mg, Oral, Q6H PRN, Jef Ramires MD  •  oxyCODONE (ROXICODONE) immediate release tablet 10 mg, 10 mg, Oral, TID, Jef Ramires MD, 10 mg at 05/15/23 0818  •  pantoprazole (PROTONIX) EC tablet 40 mg, 40 mg, Oral, Nightly, Jef Ramires MD, 40 mg at 05/14/23 2146  •  Pharmacy to Dose meropenem (MERREM), , Does not apply, Continuous PRN, Jef Ramires MD  •  promethazine (PHENERGAN) tablet 25 mg, 25 mg, Oral, Q6H PRN, Jef Ramires MD, 25 mg at 05/14/23 0849  •  sevelamer  (RENVELA) tablet 800 mg, 800 mg, Oral, TID With Meals, Jef Ramires MD, 800 mg at 05/15/23 0817  •  sodium chloride 0.9 % flush 10 mL, 10 mL, Intravenous, PRN, Jef Ramires MD  •  sodium chloride 0.9 % flush 10 mL, 10 mL, Intravenous, Q12H, Jef Ramires MD, 10 mL at 05/15/23 0821  •  sodium chloride 0.9 % flush 10 mL, 10 mL, Intravenous, PRN, Jef Ramires MD  •  sodium chloride 0.9 % infusion 40 mL, 40 mL, Intravenous, PRN, Jef Ramires MD        OBJECTIVE     Vitals:    05/15/23 0726   BP: 147/79   Pulse: 79   Resp: 18   Temp: 95.9 °F (35.5 °C)   SpO2: 95%       PHYSICAL EXAM  Physical Exam  Vitals reviewed. Nursing notes reviewed.  Constitutional:       Appearance: Well-developed.     Musculoskeletal:         Cervical back: Normal range of motion and neck supple.   Skin:     General: Skin is warm and dry.      Coloration: Skin is not pale.      Findings: No erythema or rash. Wound to right great toe.  Neurological:      Mental Status: Pt is alert and oriented to person, place, and time.   Psychiatric:         Behavior: Behavior normal.         Thought Content: Thought content normal.         Judgment: Judgment normal.       Results Review:  Lab Results (last 48 hours)     Procedure Component Value Units Date/Time    POC Glucose Once [[id number]]  (Abnormal) Collected: 05/15/23 1024    Specimen: Blood Updated: 05/15/23 1045     Glucose 225 mg/dL      Comment: RN NotifiedOperator: [id number] JUAQUIN GONGORATucson Heart HospitalMeter ID: [id number]       POC Glucose Once [[id number]]  (Abnormal) Collected: 05/15/23 0610    Specimen: Blood Updated: 05/15/23 0638     Glucose 192 mg/dL      Comment: RN NotifiedOperator: [id number] MERYL [employer] Medical InnovationsMeter ID: [id number]       POC Glucose Once [[id number]]  (Abnormal) Collected: 05/14/23 2028    Specimen: Blood Updated: 05/14/23 2055     Glucose 185 mg/dL      Comment: RN NotifiedOperator: [id number] MERYL [employer] Medical InnovationsMeter ID: [id number]       POC Glucose Once  [[id number]]  (Abnormal) Collected: 05/14/23 1611    Specimen: Blood Updated: 05/14/23 1636     Glucose 154 mg/dL      Comment: RN NotifiedOperator: [id number] KEVIN BAHENAYMeter ID: [id number]       POC Glucose Once [[id number]]  (Abnormal) Collected: 05/14/23 1053    Specimen: Blood Updated: 05/14/23 1119     Glucose 303 mg/dL      Comment: Result Not ConfirmedOperator: [id number] KEVIN BAHENAYMeter ID: [id number]       Basic Metabolic Panel [[id number]]  (Abnormal) Collected: 05/14/23 0552    Specimen: Blood Updated: 05/14/23 0658     Glucose 157 mg/dL      BUN 36 mg/dL      Creatinine 4.14 mg/dL      Sodium 133 mmol/L      Potassium 4.2 mmol/L      Chloride 91 mmol/L      CO2 25.0 mmol/L      Calcium 10.1 mg/dL      BUN/Creatinine Ratio 8.7     Anion Gap 17.0 mmol/L      eGFR 11.5 mL/min/1.73      Comment: <15 Indicative of kidney failure       Narrative:      GFR Normal >60  Chronic Kidney Disease <60  Kidney Failure <15      POC Glucose Once [[id number]]  (Abnormal) Collected: 05/14/23 0546    Specimen: Blood Updated: 05/14/23 0649     Glucose 154 mg/dL      Comment: RN NotifiedOperator: [id number] PATRICIA Pratt ID: [id number]       CBC & Differential [[id number]]  (Abnormal) Collected: 05/14/23 0552    Specimen: Blood Updated: 05/14/23 0630    Narrative:      The following orders were created for panel order CBC & Differential.  Procedure                               Abnormality         Status                     ---------                               -----------         ------                     CBC Auto Differential[[id number]]        Abnormal            Final result                 Please view results for these tests on the individual orders.    CBC Auto Differential [[id number]]  (Abnormal) Collected: 05/14/23 0552    Specimen: Blood Updated: 05/14/23 0630     WBC 7.83 10*3/mm3      RBC 3.98 10*6/mm3      Hemoglobin 11.5 g/dL      Hematocrit 35.9 %      MCV 90.2 fL      MCH 28.9 pg      MCHC  32.0 g/dL      RDW 14.5 %      RDW-SD 47.7 fl      MPV 8.7 fL      Platelets 238 10*3/mm3      Neutrophil % 52.6 %      Lymphocyte % 30.8 %      Monocyte % 10.1 %      Eosinophil % 4.0 %      Basophil % 1.1 %      Immature Grans % 1.4 %      Neutrophils, Absolute 4.12 10*3/mm3      Lymphocytes, Absolute 2.41 10*3/mm3      Monocytes, Absolute 0.79 10*3/mm3      Eosinophils, Absolute 0.31 10*3/mm3      Basophils, Absolute 0.09 10*3/mm3      Immature Grans, Absolute 0.11 10*3/mm3      nRBC 0.0 /100 WBC     POC Glucose Once [[id number]]  (Normal) Collected: 05/13/23 2016    Specimen: Blood Updated: 05/13/23 2112     Glucose 121 mg/dL      Comment: RN NotifiedOperator: [id number] PATRICIA RoboCVMeter ID: [id number]       POC Glucose Once [[id number]]  (Normal) Collected: 05/13/23 1623    Specimen: Blood Updated: 05/13/23 1649     Glucose 112 mg/dL      Comment: RN NotifiedOperator: [id number] KEVIN [employer]YMeter ID: [id number]       POC Glucose Once [[id number]]  (Abnormal) Collected: 05/13/23 0558    Specimen: Blood Updated: 05/13/23 1319     Glucose 131 mg/dL      Comment: RN NotifiedOperator: [id number] PATRICIA Reading RoomNEYMeter ID: [id number]       POC Glucose Once [[id number]]  (Abnormal) Collected: 05/13/23 1035    Specimen: Blood Updated: 05/13/23 1140     Glucose 202 mg/dL      Comment: RN NotifiedOperator: [id number] [email or web address] [email or web address] ID: [id number]           Imaging Results (Last 72 Hours)     ** No results found for the last 72 hours. **             ASSESSMENT/PLAN       Examination and evaluation of wound(s) was performed.    DIAGNOSIS:     Post op wound dehiscence, right foot  Diabetes Mellitus with foot ulcer    PLAN:     Woundvac changed. Black foam placed and bridged to top of foot. No bone or tendon exposure. Will change on Wednesday. Call with questions.    Discussed findings and treatment plan including risks, benefits, and treatment options with patient in detail. Patient agreed with treatment  "plan.          This document has been electronically signed by BRIDGETT Corbett on May 15, 2023 10:58 CDT         Electronically signed by Raegan Jonas APRN at 05/15/23 1058     Ace Lauren MD at 05/15/23 1009            [facility]  [address] [place] [address]. [address]  T - [phone]  F - [phone]     Progress Note          PATIENT  DEMOGRAPHICS   PATIENT NAME: Yamileth Slater                      PHYSICIAN: Ace Lauren MD  : 1960  MRN: [id number]   LOS: 3 days    Patient Care Team:  Kiersten Wilson APRN as PCP - General (Family Medicine)  Subjective    SUBJECTIVE     More alert. No soa , getting wound vac change today         Objective    OBJECTIVE   Vital Signs  Temp:  [95.9 °F (35.5 °C)-98 °F (36.7 °C)] 95.9 °F (35.5 °C)  Heart Rate:  [70-85] 79  Resp:  [18] 18  BP: (106-184)/(53-84) 147/79    Flowsheet Rows    Flowsheet Row First Filed Value   Admission Height 157.5 cm (62\") Documented at 2023 1051   Admission Weight 111 kg (245 lb) Documented at 2023 1051           I/O last 3 completed shifts:  In: 1200 [P.O.:1200]  Out: 1350 [Urine:1350]    PHYSICAL EXAM    Physical Exam  Constitutional:       Appearance: She is well-developed.   HENT:      Head: Normocephalic.   Eyes:      Pupils: Pupils are equal, round, and reactive to light.   Cardiovascular:      Rate and Rhythm: Normal rate and regular rhythm.      Heart sounds: Normal heart sounds.   Pulmonary:      Effort: Pulmonary effort is normal.      Breath sounds: Normal breath sounds.   Abdominal:      General: Bowel sounds are normal.      Palpations: Abdomen is soft.   Musculoskeletal:         General: No swelling.   Skin:     Coloration: Skin is not jaundiced.   Neurological:      General: No focal deficit present.      Mental Status: She is alert and oriented to person, place, and time.         RESULTS   Results Review:    Results from last 7 days   Lab Units 23  0552 " 05/13/23  0616 05/12/23  0734 05/11/23  1038   SODIUM mmol/L 133* 135* 134* 139   POTASSIUM mmol/L 4.2 5.2 4.9 6.5*   CHLORIDE mmol/L 91* 99 96* 104   CO2 mmol/L 25.0 23.0 24.0 19.0*   BUN mg/dL 36* 62* 48* 85*   CREATININE mg/dL 4.14* 5.78* 4.52* 7.00*   CALCIUM mg/dL 10.1 9.8 9.7 9.4   BILIRUBIN mg/dL  --   --   --  0.4   ALK PHOS U/L  --   --   --  282*   ALT (SGPT) U/L  --   --   --  13   AST (SGOT) U/L  --   --   --  17   GLUCOSE mg/dL 157* 121* 86 60*       Estimated Creatinine Clearance: 16 mL/min (A) (by C-G formula based on SCr of 4.14 mg/dL (H)).    Results from last 7 days   Lab Units 05/11/23  1038   MAGNESIUM mg/dL 2.1             Results from last 7 days   Lab Units 05/14/23  0552 05/13/23  0616 05/12/23  0734 05/11/23  1038   WBC 10*3/mm3 7.83 6.59 6.04 6.21   HEMOGLOBIN g/dL 11.5* 9.7* 10.3* 8.7*   PLATELETS 10*3/mm3 238 208 233 187               Imaging Results (Last 24 Hours)     ** No results found for the last 24 hours. **           MEDICATIONS    atorvastatin, 40 mg, Oral, Daily  carvedilol, 6.25 mg, Oral, BID With Meals  cetirizine, 5 mg, Oral, Daily  clopidogrel, 75 mg, Oral, Daily  cyclobenzaprine, 5 mg, Oral, BID  docusate sodium, 100 mg, Oral, BID  DULoxetine, 20 mg, Oral, Daily  heparin (porcine), 5,000 Units, Subcutaneous, Q12H  Insulin Aspart, 0-9 Units, Subcutaneous, TID AC  insulin aspart, 14 Units, Subcutaneous, TID With Meals  insulin detemir, 24 Units, Subcutaneous, Nightly  losartan, 25 mg, Oral, Daily  memantine, 5 mg, Oral, BID  meropenem, 500 mg, Intravenous, Q24H  nystatin, , Topical, Q12H  oxyCODONE, 10 mg, Oral, TID  pantoprazole, 40 mg, Oral, Nightly  sevelamer, 800 mg, Oral, TID With Meals  sodium chloride, 10 mL, Intravenous, Q12H      Pharmacy to Dose meropenem (MERREM),         Assessment & Plan   ASSESSMENT / PLAN      ESRD (end stage renal disease) on dialysis    1.  ESRD on hemodialysis- normally dialyzes TTS at [facility] in [place].  Using avf or tunnel cath  these days.    continue HD TTS. Fistula was used on saturday     2.  Hyperkalemia- now better     3.  Anemia of CKD hgb stable     4.  CAD     5.  DM2     6.  CKD-MBD     7.  Hypertension     8. uti on rocephin             This document has been electronically signed by Ace Lauren MD on May 15, 2023 10:09 CDT           Electronically signed by Ace Lauren MD at 05/15/23 1010     Jef Ramires MD at 05/14/23 1000              [facility] Medicine Services  INPATIENT PROGRESS NOTE    Length of Stay: 2  Date of Admission: 5/11/2023  Primary Care Physician: Kiersten Wilson APRN    Subjective   Chief Complaint: Confusion  HPI: Patient states she is feeling much better.  States she wants to go home.    As of today, 05/14/23  Review of Systems   Constitutional: Negative for appetite change, chills, fatigue, fever and unexpected weight change.   Respiratory: Negative for cough, choking, chest tightness, shortness of breath and wheezing.    Cardiovascular: Negative for chest pain, palpitations and leg swelling.   Gastrointestinal: Negative for abdominal pain, blood in stool, constipation, diarrhea, nausea and vomiting.   Genitourinary: Negative for dysuria, flank pain and hematuria.   Musculoskeletal: Positive for back pain and neck pain.   Neurological: Negative for dizziness, seizures, syncope, speech difficulty, weakness, light-headedness, numbness and headaches.   Hematological: Does not bruise/bleed easily.        All pertinent negatives and positives are as above. All other systems have been reviewed and are negative unless otherwise stated.    Objective    Temp:  [97 °F (36.1 °C)-97.9 °F (36.6 °C)] 97.2 °F (36.2 °C)  Heart Rate:  [66-89] 78  Resp:  [16-18] 18  BP: (112-170)/(46-88) 124/79    AM-PAC 6 Clicks Score (PT): 12 (05/14/23 0900)    As of today, 05/14/23  Physical Exam  Vitals reviewed.   Constitutional:       Appearance: She is well-developed.    HENT:      Head: Normocephalic and atraumatic.   Eyes:      Pupils: Pupils are equal, round, and reactive to light.   Cardiovascular:      Rate and Rhythm: Normal rate and regular rhythm.      Heart sounds: Normal heart sounds. No murmur heard.    No friction rub. No gallop.   Pulmonary:      Effort: Pulmonary effort is normal. No respiratory distress.      Breath sounds: Normal breath sounds. No wheezing or rales.   Chest:      Chest wall: No tenderness.   Abdominal:      General: Bowel sounds are normal. There is no distension.      Palpations: Abdomen is soft.      Tenderness: There is no abdominal tenderness. There is no guarding.   Musculoskeletal:      Cervical back: Normal range of motion and neck supple.      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   Neurological:      Comments: More oriented today.     Psychiatric:         Behavior: Behavior normal.         Thought Content: Thought content normal.           Results Review:  I have reviewed the labs, radiology results, and diagnostic studies.    Laboratory Data:   Results from last 7 days   Lab Units 05/14/23  0552 05/13/23  0616 05/12/23  0734 05/11/23  1038   SODIUM mmol/L 133* 135* 134* 139   POTASSIUM mmol/L 4.2 5.2 4.9 6.5*   CHLORIDE mmol/L 91* 99 96* 104   CO2 mmol/L 25.0 23.0 24.0 19.0*   BUN mg/dL 36* 62* 48* 85*   CREATININE mg/dL 4.14* 5.78* 4.52* 7.00*   GLUCOSE mg/dL 157* 121* 86 60*   CALCIUM mg/dL 10.1 9.8 9.7 9.4   BILIRUBIN mg/dL  --   --   --  0.4   ALK PHOS U/L  --   --   --  282*   ALT (SGPT) U/L  --   --   --  13   AST (SGOT) U/L  --   --   --  17   ANION GAP mmol/L 17.0* 13.0 14.0 16.0*     Estimated Creatinine Clearance: 15.9 mL/min (A) (by C-G formula based on SCr of 4.14 mg/dL (H)).  Results from last 7 days   Lab Units 05/11/23  1038   MAGNESIUM mg/dL 2.1         Results from last 7 days   Lab Units 05/14/23  0552 05/13/23  0616 05/12/23  0734 05/11/23  1038    WBC 10*3/mm3 7.83 6.59 6.04 6.21   HEMOGLOBIN g/dL 11.5* 9.7* 10.3* 8.7*   HEMATOCRIT % 35.9 30.1* 32.7* 28.7*   PLATELETS 10*3/mm3 238 208 233 187           Culture Data:   No results found for: BLOODCX  Urine Culture   Date Value Ref Range Status   05/11/2023 >100,000 CFU/mL Escherichia coli ESBL (A)  Final     Comment:       Consider infectious disease consult.  Susceptibility results may not correlate to clinical outcomes.     No results found for: RESPCX  No results found for: WOUNDCX  No results found for: STOOLCX  No components found for: BODYFLD    Radiology Data:   Imaging Results (Last 24 Hours)     ** No results found for the last 24 hours. **          I have utilized all available immediate resources to obtain, update, or review the patient's current medications (including all prescriptions, over-the-counter products, herbals, cannabis/cannabidiol products, and vitamin/mineral/dietary (nutritional) supplements).       Assessment/Plan     Active Hospital Problems    Diagnosis    • **ESRD (end stage renal disease) on dialysis        Plan:    1.  Altered mental status: Most likely multifactorial.  Likely secondary to inflammation of urinary tract infection and uremia.  Better today.  Confusion clearing.    2.  End-stage renal disease: Appreciate nephrology help.  Dialyzed Tuesday, Thursday, and Saturday.  3.  Hyperkalemia: Management with hemodialysis.  4.  Coronary artery disease: Continue home medication.  5.  Urinary tract infection: Urine culture shows ESBL E. coli.  Continue meropenem.  We will see if we can arrange for home dosing.  6.  Diabetes mellitus: Long-acting insulin and sliding scale insulin.  7.  Morbid obesity  8.  DVT prophylaxis: Heparin.    Medical Decision Making  Number and Complexity of problems: 2 highly complex medical problems    Conditions and Status:        Condition is improving.     MDM Data  External documents reviewed: Primary care and subspecialty notes  My EKG  "interpretation: Normal sinus rhythm  My plain film interpretation: No acute cardiopulmonary disease     Discussed with: Patient     Treatment Plan  As above    Care Planning  Shared decision making: Patient in agreement with plan of care  Code status and discussions: Full code    Disposition  Social Determinants of Health that impact treatment or disposition: None  I expect the patient to be discharged to skilled facility in 2-3 days.       The patient was evaluated during the global COVID-19 pandemic, and the diagnosis was suspected/considered upon their initial presentation.  Evaluation, treatment, and testing were consistent with current guidelines for patients who present with complaints or symptoms that may be related to COVID-19.    I confirmed that the patient's Advance Care Plan is present, code status is documented, or surrogate decision maker is listed in the patient's medical record.        This document has been electronically signed by Jef Ramires MD on May 14, 2023 10:01 CDT        Electronically signed by Jef Ramires MD at 23 1002     Tee Jacobo MD at 23 0940            [facility]  [address] [place] [address]. [address]  T - [phone]  F - [phone]     Progress Note          PATIENT  DEMOGRAPHICS   PATIENT NAME: Yamileth Slater                      PHYSICIAN: Tee Jacobo MD  : 1960  MRN: [id number]   LOS: 2 days    Patient Care Team:  Kiersten Wilson APRN as PCP - General (Family Medicine)  Subjective    SUBJECTIVE     Congnition is much better, she reports no complains          Objective    OBJECTIVE   Vital Signs  Temp:  [97 °F (36.1 °C)-97.9 °F (36.6 °C)] 97.2 °F (36.2 °C)  Heart Rate:  [66-89] 78  Resp:  [16-18] 18  BP: (112-170)/(46-88) 124/79    Flowsheet Rows    Flowsheet Row First Filed Value   Admission Height 157.5 cm (62\") Documented at 2023 1051   Admission Weight 111 kg (245 lb) Documented at 2023 " 1051           I/O last 3 completed shifts:  In: 1200 [P.O.:1200]  Out: 4550 [Urine:1550; Other:3000]    PHYSICAL EXAM    Physical Exam  Constitutional:       Appearance: She is well-developed.   HENT:      Head: Normocephalic.   Eyes:      Pupils: Pupils are equal, round, and reactive to light.   Cardiovascular:      Rate and Rhythm: Normal rate and regular rhythm.      Heart sounds: Normal heart sounds.   Pulmonary:      Effort: Pulmonary effort is normal.      Breath sounds: Normal breath sounds.   Abdominal:      General: Bowel sounds are normal.      Palpations: Abdomen is soft.   Musculoskeletal:         General: No swelling.   Skin:     Coloration: Skin is not jaundiced.   Neurological:      General: No focal deficit present.      Mental Status: She is alert and oriented to person, place, and time.         RESULTS   Results Review:    Results from last 7 days   Lab Units 05/14/23  0552 05/13/23  0616 05/12/23  0734 05/11/23  1038   SODIUM mmol/L 133* 135* 134* 139   POTASSIUM mmol/L 4.2 5.2 4.9 6.5*   CHLORIDE mmol/L 91* 99 96* 104   CO2 mmol/L 25.0 23.0 24.0 19.0*   BUN mg/dL 36* 62* 48* 85*   CREATININE mg/dL 4.14* 5.78* 4.52* 7.00*   CALCIUM mg/dL 10.1 9.8 9.7 9.4   BILIRUBIN mg/dL  --   --   --  0.4   ALK PHOS U/L  --   --   --  282*   ALT (SGPT) U/L  --   --   --  13   AST (SGOT) U/L  --   --   --  17   GLUCOSE mg/dL 157* 121* 86 60*       Estimated Creatinine Clearance: 15.9 mL/min (A) (by C-G formula based on SCr of 4.14 mg/dL (H)).    Results from last 7 days   Lab Units 05/11/23  1038   MAGNESIUM mg/dL 2.1             Results from last 7 days   Lab Units 05/14/23  0552 05/13/23  0616 05/12/23  0734 05/11/23  1038   WBC 10*3/mm3 7.83 6.59 6.04 6.21   HEMOGLOBIN g/dL 11.5* 9.7* 10.3* 8.7*   PLATELETS 10*3/mm3 238 208 233 187               Imaging Results (Last 24 Hours)     ** No results found for the last 24 hours. **           MEDICATIONS    atorvastatin, 40 mg, Oral, Daily  carvedilol, 6.25 mg,  Oral, BID With Meals  cetirizine, 5 mg, Oral, Daily  clopidogrel, 75 mg, Oral, Daily  cyclobenzaprine, 5 mg, Oral, BID  docusate sodium, 100 mg, Oral, BID  DULoxetine, 20 mg, Oral, Daily  heparin (porcine), 5,000 Units, Subcutaneous, Q12H  Insulin Aspart, 0-9 Units, Subcutaneous, TID AC  insulin aspart, 14 Units, Subcutaneous, TID With Meals  insulin detemir, 24 Units, Subcutaneous, Nightly  losartan, 25 mg, Oral, Daily  memantine, 5 mg, Oral, BID  meropenem, 500 mg, Intravenous, Q24H  nystatin, , Topical, Q12H  oxyCODONE, 10 mg, Oral, TID  pantoprazole, 40 mg, Oral, Nightly  sevelamer, 800 mg, Oral, TID With Meals  sodium chloride, 10 mL, Intravenous, Q12H      Pharmacy to Dose meropenem (MERREM),         Assessment & Plan   ASSESSMENT / PLAN      ESRD (end stage renal disease) on dialysis    1.  ESRD on hemodialysis- normally dialyzes TTS at [facility] in [place].  She missed her dialysis on Tuesday. Had hd on Thursday. Next hd tomorrow. Using avf or tunnel cath these days.    continue HD TTS     2.  Hyperkalemia- now better     3.  Anemia of CKD hgb stable     4.  CAD     5.  DM2     6.  CKD-MBD     7.  Hypertension     8. uti on rocephin             This document has been electronically signed by Tee Jacobo MD on May 14, 2023 09:40 CDT           Electronically signed by Tee Jacobo MD at 05/14/23 0940     Jef Ramires MD at 05/13/23 1142              [facility] Medicine Services  INPATIENT PROGRESS NOTE    Length of Stay: 1  Date of Admission: 5/11/2023  Primary Care Physician: Kiersten Wilson APRN    Subjective   Chief Complaint: Confusion  HPI: Patient states she is feeling better.  More oriented today.    As of today, 05/13/23  Review of Systems   Constitutional: Negative for appetite change, chills, fatigue, fever and unexpected weight change.   Respiratory: Negative for cough, choking, chest tightness, shortness of breath and  wheezing.    Cardiovascular: Negative for chest pain, palpitations and leg swelling.   Gastrointestinal: Negative for abdominal pain, blood in stool, constipation, diarrhea, nausea and vomiting.   Genitourinary: Negative for dysuria, flank pain and hematuria.   Musculoskeletal: Positive for back pain and neck pain.   Neurological: Negative for dizziness, seizures, syncope, speech difficulty, weakness, light-headedness, numbness and headaches.   Hematological: Does not bruise/bleed easily.        All pertinent negatives and positives are as above. All other systems have been reviewed and are negative unless otherwise stated.    Objective    Temp:  [97.2 °F (36.2 °C)-98 °F (36.7 °C)] 97.9 °F (36.6 °C)  Heart Rate:  [66-85] 72  Resp:  [16-18] 16  BP: (125-160)/(58-70) 133/58    AM-PAC 6 Clicks Score (PT): 12 (05/13/23 0823)    As of today, 05/13/23  Physical Exam  Vitals reviewed.   Constitutional:       Appearance: She is well-developed.   HENT:      Head: Normocephalic and atraumatic.   Eyes:      Pupils: Pupils are equal, round, and reactive to light.   Cardiovascular:      Rate and Rhythm: Normal rate and regular rhythm.      Heart sounds: Normal heart sounds. No murmur heard.    No friction rub. No gallop.   Pulmonary:      Effort: Pulmonary effort is normal. No respiratory distress.      Breath sounds: Normal breath sounds. No wheezing or rales.   Chest:      Chest wall: No tenderness.   Abdominal:      General: Bowel sounds are normal. There is no distension.      Palpations: Abdomen is soft.      Tenderness: There is no abdominal tenderness. There is no guarding.   Musculoskeletal:      Cervical back: Normal range of motion and neck supple.      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   Neurological:      Comments: More oriented today.     Psychiatric:         Behavior: Behavior normal.         Thought Content: Thought content normal.            Results Review:  I have reviewed the labs, radiology results, and diagnostic studies.    Laboratory Data:   Results from last 7 days   Lab Units 05/13/23  0616 05/12/23  0734 05/11/23  1038   SODIUM mmol/L 135* 134* 139   POTASSIUM mmol/L 5.2 4.9 6.5*   CHLORIDE mmol/L 99 96* 104   CO2 mmol/L 23.0 24.0 19.0*   BUN mg/dL 62* 48* 85*   CREATININE mg/dL 5.78* 4.52* 7.00*   GLUCOSE mg/dL 121* 86 60*   CALCIUM mg/dL 9.8 9.7 9.4   BILIRUBIN mg/dL  --   --  0.4   ALK PHOS U/L  --   --  282*   ALT (SGPT) U/L  --   --  13   AST (SGOT) U/L  --   --  17   ANION GAP mmol/L 13.0 14.0 16.0*     Estimated Creatinine Clearance: 11.6 mL/min (A) (by C-G formula based on SCr of 5.78 mg/dL (H)).  Results from last 7 days   Lab Units 05/11/23  1038   MAGNESIUM mg/dL 2.1         Results from last 7 days   Lab Units 05/13/23  0616 05/12/23  0734 05/11/23  1038   WBC 10*3/mm3 6.59 6.04 6.21   HEMOGLOBIN g/dL 9.7* 10.3* 8.7*   HEMATOCRIT % 30.1* 32.7* 28.7*   PLATELETS 10*3/mm3 208 233 187           Culture Data:   No results found for: BLOODCX  Urine Culture   Date Value Ref Range Status   05/11/2023 >100,000 CFU/mL Escherichia coli ESBL (A)  Final     Comment:       Consider infectious disease consult.  Susceptibility results may not correlate to clinical outcomes.     No results found for: RESPCX  No results found for: WOUNDCX  No results found for: STOOLCX  No components found for: BODYFLD    Radiology Data:   Imaging Results (Last 24 Hours)     ** No results found for the last 24 hours. **          I have utilized all available immediate resources to obtain, update, or review the patient's current medications (including all prescriptions, over-the-counter products, herbals, cannabis/cannabidiol products, and vitamin/mineral/dietary (nutritional) supplements).       Assessment/Plan     Active Hospital Problems    Diagnosis    • **ESRD (end stage renal disease) on dialysis        Plan:    1.  Altered mental status: Most likely  multifactorial.  Likely secondary to inflammation of urinary tract infection and uremia.  Better today.  Confusion clearing.    2.  End-stage renal disease: Appreciate nephrology help.  Plan to dialyze today.  3.  Hyperkalemia: Management with hemodialysis.  4.  Coronary artery disease: Continue home medication.  5.  Urinary tract infection: Urine culture shows ESBL E. coli.  Changed to meropenem.  6.  Diabetes mellitus: Long-acting insulin and sliding scale insulin.  7.  Morbid obesity  8.  DVT prophylaxis: Heparin.    Medical Decision Making  Number and Complexity of problems: 2 highly complex medical problems    Conditions and Status:        Condition is improving.     [facility] Data  External documents reviewed: Primary care and subspecialty notes  My EKG interpretation: Normal sinus rhythm  My plain film interpretation: No acute cardiopulmonary disease     Discussed with: Patient     Treatment Plan  As above    Care Planning  Shared decision making: Patient in agreement with plan of care  Code status and discussions: Full code    Disposition  Social Determinants of Health that impact treatment or disposition: None  I expect the patient to be discharged to skilled facility in 2-3 days.       The patient was evaluated during the global COVID-19 pandemic, and the diagnosis was suspected/considered upon their initial presentation.  Evaluation, treatment, and testing were consistent with current guidelines for patients who present with complaints or symptoms that may be related to COVID-19.    I confirmed that the patient's Advance Care Plan is present, code status is documented, or surrogate decision maker is listed in the patient's medical record.        This document has been electronically signed by Jef Ramires MD on May 13, 2023 11:42 CDT        Electronically signed by Jef Ramires MD at 05/13/23 1145

## 2023-05-16 LAB
GLUCOSE BLDC GLUCOMTR-MCNC: 165 MG/DL (ref 70–130)
GLUCOSE BLDC GLUCOMTR-MCNC: 169 MG/DL (ref 70–130)
GLUCOSE BLDC GLUCOMTR-MCNC: 189 MG/DL (ref 70–130)

## 2023-05-16 PROCEDURE — 63710000001 INSULIN ASPART PER 5 UNITS: Performed by: HOSPITALIST

## 2023-05-16 PROCEDURE — 96366 THER/PROPH/DIAG IV INF ADDON: CPT

## 2023-05-16 PROCEDURE — 90935 HEMODIALYSIS ONE EVALUATION: CPT

## 2023-05-16 PROCEDURE — 63710000001 PROMETHAZINE PER 25 MG: Performed by: HOSPITALIST

## 2023-05-16 PROCEDURE — G0378 HOSPITAL OBSERVATION PER HR: HCPCS

## 2023-05-16 PROCEDURE — 82948 REAGENT STRIP/BLOOD GLUCOSE: CPT

## 2023-05-16 PROCEDURE — 63710000001 INSULIN DETEMIR PER 5 UNITS: Performed by: HOSPITALIST

## 2023-05-16 PROCEDURE — 25010000002 MEROPENEM PER 100 MG: Performed by: HOSPITALIST

## 2023-05-16 PROCEDURE — 25010000002 HEPARIN (PORCINE) PER 1000 UNITS: Performed by: HOSPITALIST

## 2023-05-16 PROCEDURE — 96367 TX/PROPH/DG ADDL SEQ IV INF: CPT

## 2023-05-16 PROCEDURE — 63710000001 ONDANSETRON PER 8 MG: Performed by: HOSPITALIST

## 2023-05-16 PROCEDURE — 96372 THER/PROPH/DIAG INJ SC/IM: CPT

## 2023-05-16 RX ORDER — LOSARTAN POTASSIUM 50 MG/1
50 TABLET ORAL NIGHTLY
Status: DISCONTINUED | OUTPATIENT
Start: 2023-05-16 | End: 2023-05-19 | Stop reason: HOSPADM

## 2023-05-16 RX ORDER — HEPARIN SODIUM 1000 [USP'U]/ML
4000 INJECTION, SOLUTION INTRAVENOUS; SUBCUTANEOUS AS NEEDED
Status: DISCONTINUED | OUTPATIENT
Start: 2023-05-16 | End: 2023-05-19 | Stop reason: HOSPADM

## 2023-05-16 RX ORDER — LIDOCAINE AND PRILOCAINE 25; 25 MG/G; MG/G
CREAM TOPICAL AS NEEDED
Status: DISCONTINUED | OUTPATIENT
Start: 2023-05-16 | End: 2023-05-19 | Stop reason: HOSPADM

## 2023-05-16 RX ORDER — ALBUMIN (HUMAN) 12.5 G/50ML
12.5 SOLUTION INTRAVENOUS AS NEEDED
Status: ACTIVE | OUTPATIENT
Start: 2023-05-16 | End: 2023-05-16

## 2023-05-16 RX ADMIN — SEVELAMER CARBONATE 800 MG: 800 TABLET, FILM COATED ORAL at 18:20

## 2023-05-16 RX ADMIN — MEROPENEM 500 MG: 500 INJECTION, POWDER, FOR SOLUTION INTRAVENOUS at 13:26

## 2023-05-16 RX ADMIN — CARVEDILOL 6.25 MG: 6.25 TABLET, FILM COATED ORAL at 18:21

## 2023-05-16 RX ADMIN — CYCLOBENZAPRINE 5 MG: 5 TABLET, FILM COATED ORAL at 21:52

## 2023-05-16 RX ADMIN — NYSTATIN: 100000 POWDER TOPICAL at 13:31

## 2023-05-16 RX ADMIN — DOCUSATE SODIUM 100 MG: 100 CAPSULE, LIQUID FILLED ORAL at 13:27

## 2023-05-16 RX ADMIN — INSULIN ASPART 14 UNITS: 100 INJECTION, SOLUTION INTRAVENOUS; SUBCUTANEOUS at 11:16

## 2023-05-16 RX ADMIN — ATORVASTATIN CALCIUM 40 MG: 40 TABLET, FILM COATED ORAL at 13:27

## 2023-05-16 RX ADMIN — OXYCODONE HYDROCHLORIDE 10 MG: 5 TABLET ORAL at 21:53

## 2023-05-16 RX ADMIN — CETIRIZINE HYDROCHLORIDE 5 MG: 5 TABLET ORAL at 13:27

## 2023-05-16 RX ADMIN — INSULIN ASPART 14 UNITS: 100 INJECTION, SOLUTION INTRAVENOUS; SUBCUTANEOUS at 07:57

## 2023-05-16 RX ADMIN — INSULIN ASPART 2 UNITS: 100 INJECTION, SOLUTION INTRAVENOUS; SUBCUTANEOUS at 11:16

## 2023-05-16 RX ADMIN — DULOXETINE HYDROCHLORIDE 20 MG: 20 CAPSULE, DELAYED RELEASE ORAL at 13:27

## 2023-05-16 RX ADMIN — HEPARIN SODIUM 5000 UNITS: 5000 INJECTION INTRAVENOUS; SUBCUTANEOUS at 13:27

## 2023-05-16 RX ADMIN — Medication 10 ML: at 21:54

## 2023-05-16 RX ADMIN — INSULIN ASPART 2 UNITS: 100 INJECTION, SOLUTION INTRAVENOUS; SUBCUTANEOUS at 07:57

## 2023-05-16 RX ADMIN — Medication 10 ML: at 13:31

## 2023-05-16 RX ADMIN — INSULIN ASPART 14 UNITS: 100 INJECTION, SOLUTION INTRAVENOUS; SUBCUTANEOUS at 18:20

## 2023-05-16 RX ADMIN — OXYCODONE HYDROCHLORIDE 10 MG: 5 TABLET ORAL at 18:20

## 2023-05-16 RX ADMIN — ONDANSETRON HYDROCHLORIDE 4 MG: 4 TABLET, FILM COATED ORAL at 12:12

## 2023-05-16 RX ADMIN — INSULIN ASPART 2 UNITS: 100 INJECTION, SOLUTION INTRAVENOUS; SUBCUTANEOUS at 18:21

## 2023-05-16 RX ADMIN — HEPARIN SODIUM 5000 UNITS: 5000 INJECTION INTRAVENOUS; SUBCUTANEOUS at 21:52

## 2023-05-16 RX ADMIN — PROMETHAZINE HYDROCHLORIDE 25 MG: 25 TABLET ORAL at 17:36

## 2023-05-16 RX ADMIN — DOCUSATE SODIUM 100 MG: 100 CAPSULE, LIQUID FILLED ORAL at 21:53

## 2023-05-16 RX ADMIN — CLOPIDOGREL BISULFATE 75 MG: 75 TABLET ORAL at 13:27

## 2023-05-16 RX ADMIN — CYCLOBENZAPRINE 5 MG: 5 TABLET, FILM COATED ORAL at 13:27

## 2023-05-16 RX ADMIN — LIDOCAINE AND PRILOCAINE: 25; 25 CREAM TOPICAL at 07:20

## 2023-05-16 RX ADMIN — ACETAMINOPHEN 650 MG: 325 TABLET, FILM COATED ORAL at 07:54

## 2023-05-16 RX ADMIN — LOSARTAN POTASSIUM 50 MG: 50 TABLET, FILM COATED ORAL at 21:53

## 2023-05-16 RX ADMIN — MEMANTINE 5 MG: 5 TABLET ORAL at 21:53

## 2023-05-16 RX ADMIN — HEPARIN SODIUM 2000 UNITS: 1000 INJECTION INTRAVENOUS; SUBCUTANEOUS at 08:22

## 2023-05-16 RX ADMIN — MEMANTINE 5 MG: 5 TABLET ORAL at 13:27

## 2023-05-16 RX ADMIN — INSULIN DETEMIR 24 UNITS: 100 INJECTION, SOLUTION SUBCUTANEOUS at 21:53

## 2023-05-16 RX ADMIN — PANTOPRAZOLE SODIUM 40 MG: 40 TABLET, DELAYED RELEASE ORAL at 21:53

## 2023-05-16 NOTE — PROGRESS NOTES
[facility]  INPATIENT PROGRESS NOTE    Length of Stay: 4  Date of Admission: 5/11/2023  Primary Care Physician: Kiersten Wilson APRN    Subjective   Chief Complaint: Confusion  HPI: States she feels okay.  No specific complaints.  Tolerating dialysis.    As of today, 05/16/23  Review of Systems   Constitutional: Negative for appetite change, chills, fatigue, fever and unexpected weight change.   Respiratory: Negative for cough, choking, chest tightness, shortness of breath and wheezing.    Cardiovascular: Negative for chest pain, palpitations and leg swelling.   Gastrointestinal: Negative for abdominal pain, blood in stool, constipation, diarrhea, nausea and vomiting.   Genitourinary: Negative for dysuria, flank pain and hematuria.   Musculoskeletal: Positive for back pain and neck pain.   Neurological: Negative for dizziness, seizures, syncope, speech difficulty, weakness, light-headedness, numbness and headaches.   Hematological: Does not bruise/bleed easily.        All pertinent negatives and positives are as above. All other systems have been reviewed and are negative unless otherwise stated.    Objective    Temp:  [97.2 °F (36.2 °C)-97.8 °F (36.6 °C)] 97.3 °F (36.3 °C)  Heart Rate:  [64-80] 80  Resp:  [16-18] 17  BP: (128-186)/(52-86) 135/69    AM-PAC 6 Clicks Score (PT): 14 (05/16/23 0759)    As of today, 05/16/23  Physical Exam  Vitals reviewed.   Constitutional:       Appearance: She is well-developed.   HENT:      Head: Normocephalic and atraumatic.   Eyes:      Pupils: Pupils are equal, round, and reactive to light.   Cardiovascular:      Rate and Rhythm: Normal rate and regular rhythm.      Heart sounds: Normal heart sounds. No murmur heard.    No friction rub. No gallop.   Pulmonary:      Effort: Pulmonary effort is normal. No respiratory distress.      Breath sounds: Normal breath sounds. No wheezing or rales.   Chest:       Chest wall: No tenderness.   Abdominal:      General: Bowel sounds are normal. There is no distension.      Palpations: Abdomen is soft.      Tenderness: There is no abdominal tenderness. There is no guarding.   Musculoskeletal:      Cervical back: Normal range of motion and neck supple.      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   Neurological:      Comments: More oriented today.     Psychiatric:         Behavior: Behavior normal.         Thought Content: Thought content normal.           Results Review:  I have reviewed the labs, radiology results, and diagnostic studies.    Laboratory Data:   Results from last 7 days   Lab Units 05/14/23  0552 05/13/23  0616 05/12/23  0734 05/11/23  1038   SODIUM mmol/L 133* 135* 134* 139   POTASSIUM mmol/L 4.2 5.2 4.9 6.5*   CHLORIDE mmol/L 91* 99 96* 104   CO2 mmol/L 25.0 23.0 24.0 19.0*   BUN mg/dL 36* 62* 48* 85*   CREATININE mg/dL 4.14* 5.78* 4.52* 7.00*   GLUCOSE mg/dL 157* 121* 86 60*   CALCIUM mg/dL 10.1 9.8 9.7 9.4   BILIRUBIN mg/dL  --   --   --  0.4   ALK PHOS U/L  --   --   --  282*   ALT (SGPT) U/L  --   --   --  13   AST (SGOT) U/L  --   --   --  17   ANION GAP mmol/L 17.0* 13.0 14.0 16.0*     Estimated Creatinine Clearance: 16.1 mL/min (A) (by C-G formula based on SCr of 4.14 mg/dL (H)).  Results from last 7 days   Lab Units 05/11/23  1038   MAGNESIUM mg/dL 2.1         Results from last 7 days   Lab Units 05/14/23  0552 05/13/23  0616 05/12/23  0734 05/11/23  1038   WBC 10*3/mm3 7.83 6.59 6.04 6.21   HEMOGLOBIN g/dL 11.5* 9.7* 10.3* 8.7*   HEMATOCRIT % 35.9 30.1* 32.7* 28.7*   PLATELETS 10*3/mm3 238 208 233 187           Culture Data:   No results found for: BLOODCX  No results found for: URINECX  No results found for: RESPCX  No results found for: WOUNDCX  No results found for: STOOLCX  No components found for: BODYFLD    Radiology Data:   Imaging Results (Last 24 Hours)     ** No results  found for the last 24 hours. **          I have utilized all available immediate resources to obtain, update, or review the patient's current medications (including all prescriptions, over-the-counter products, herbals, cannabis/cannabidiol products, and vitamin/mineral/dietary (nutritional) supplements).       Assessment/Plan     Active Hospital Problems    Diagnosis    • **ESRD (end stage renal disease) on dialysis        Plan:    1.  Altered mental status: Most likely multifactorial.  Likely secondary to inflammation of urinary tract infection and uremia.  Doing better.  2.  End-stage renal disease: Appreciate nephrology help.  Dialyzed Tuesday, Thursday, and Saturday.  3.  Hyperkalemia: Management with hemodialysis.  4.  Coronary artery disease: Continue home medication.  5.  Urinary tract infection: Urine culture shows ESBL E. coli.  Continue meropenem.  Last dose 5/19.  Attempting to arrange LTAC for continuation of antibiotics.  6.  Diabetes mellitus: Long-acting insulin and sliding scale insulin.  7.  Morbid obesity  8.  DVT prophylaxis: Heparin.    Medical Decision Making  Number and Complexity of problems: 2 highly complex medical problems    Conditions and Status:        Condition is improving.     [facility] Data  External documents reviewed: Primary care and subspecialty notes  My EKG interpretation: Normal sinus rhythm  My plain film interpretation: No acute cardiopulmonary disease     Discussed with: Patient     Treatment Plan  As above    Care Planning  Shared decision making: Patient in agreement with plan of care  Code status and discussions: Full code    Disposition  Social Determinants of Health that impact treatment or disposition: None  I expect the patient to be discharged to skilled facility in 2-3 days.       The patient was evaluated during the global COVID-19 pandemic, and the diagnosis was suspected/considered upon their initial presentation.  Evaluation, treatment, and testing were consistent  with current guidelines for patients who present with complaints or symptoms that may be related to COVID-19.    I confirmed that the patient's Advance Care Plan is present, code status is documented, or surrogate decision maker is listed in the patient's medical record.        This document has been electronically signed by Jef Ramires MD on May 16, 2023 11:27 CDT

## 2023-05-16 NOTE — PLAN OF CARE
Problem: Device-Related Complication Risk (Hemodialysis)  Goal: Safe, Effective Therapy Delivery  Outcome: Ongoing, Progressing     Problem: Hemodynamic Instability (Hemodialysis)  Goal: Effective Tissue Perfusion  Outcome: Ongoing, Progressing     Problem: Infection (Hemodialysis)  Goal: Absence of Infection Signs and Symptoms  Outcome: Ongoing, Progressing   Goal Outcome Evaluation:    Second use of AVF. Access functioned well. Removed 2700 mls. Pt tolerated well

## 2023-05-16 NOTE — PROGRESS NOTES
"[facility]  [address] [place] [address]. [address]  T - [phone]  F - [phone]     Progress Note          PATIENT  DEMOGRAPHICS   PATIENT NAME: Yamileth Slater                      PHYSICIAN: Edis Goldsmith MD  : 1960  MRN: [id number]   LOS: 4 days    Patient Care Team:  Kiersten Wilson APRN as PCP - General (Family Medicine)  Subjective   SUBJECTIVE   Alert and complaining of feeling to hot today. On dialysis today.          Objective   OBJECTIVE   Vital Signs  Temp:  [97.2 °F (36.2 °C)-97.8 °F (36.6 °C)] 97.2 °F (36.2 °C)  Heart Rate:  [64-77] 73  Resp:  [18] 18  BP: (124-186)/(60-86) 166/68    Flowsheet Rows    Flowsheet Row First Filed Value   Admission Height 157.5 cm (62\") Documented at 2023 1051   Admission Weight 111 kg (245 lb) Documented at 2023 1051           I/O last 3 completed shifts:  In: 720 [P.O.:720]  Out: 2500 [Urine:2500]    PHYSICAL EXAM    Physical Exam  Constitutional:       General: She is not in acute distress.     Appearance: Normal appearance. She is obese. She is not ill-appearing or diaphoretic.   Eyes:      Conjunctiva/sclera: Conjunctivae normal.   Musculoskeletal:      Right lower leg: No edema.      Left lower leg: No edema.   Skin:     General: Skin is warm and dry.      Findings: Bruising present.   Neurological:      Mental Status: She is alert.   Psychiatric:         Mood and Affect: Mood normal.         Behavior: Behavior normal.         RESULTS   Results Review:    Results from last 7 days   Lab Units 23  0552 23  0616 23  0734 23  1038   SODIUM mmol/L 133* 135* 134* 139   POTASSIUM mmol/L 4.2 5.2 4.9 6.5*   CHLORIDE mmol/L 91* 99 96* 104   CO2 mmol/L 25.0 23.0 24.0 19.0*   BUN mg/dL 36* 62* 48* 85*   CREATININE mg/dL 4.14* 5.78* 4.52* 7.00*   CALCIUM mg/dL 10.1 9.8 9.7 9.4   BILIRUBIN mg/dL  --   --   --  0.4   ALK PHOS U/L  --   --   --  282*   ALT (SGPT) U/L  --   --   --  13 "   AST (SGOT) U/L  --   --   --  17   GLUCOSE mg/dL 157* 121* 86 60*       Estimated Creatinine Clearance: 16.1 mL/min (A) (by C-G formula based on SCr of 4.14 mg/dL (H)).    Results from last 7 days   Lab Units 05/11/23  1038   MAGNESIUM mg/dL 2.1             Results from last 7 days   Lab Units 05/14/23  0552 05/13/23  0616 05/12/23  0734 05/11/23  1038   WBC 10*3/mm3 7.83 6.59 6.04 6.21   HEMOGLOBIN g/dL 11.5* 9.7* 10.3* 8.7*   PLATELETS 10*3/mm3 238 208 233 187               Imaging Results (Last 24 Hours)     ** No results found for the last 24 hours. **           MEDICATIONS    atorvastatin, 40 mg, Oral, Daily  carvedilol, 6.25 mg, Oral, BID With Meals  cetirizine, 5 mg, Oral, Daily  clopidogrel, 75 mg, Oral, Daily  cyclobenzaprine, 5 mg, Oral, BID  docusate sodium, 100 mg, Oral, BID  DULoxetine, 20 mg, Oral, Daily  heparin (porcine), 5,000 Units, Subcutaneous, Q12H  Insulin Aspart, 0-9 Units, Subcutaneous, TID AC  insulin aspart, 14 Units, Subcutaneous, TID With Meals  insulin detemir, 24 Units, Subcutaneous, Nightly  losartan, 25 mg, Oral, Daily  memantine, 5 mg, Oral, BID  meropenem, 500 mg, Intravenous, Q24H  nystatin, , Topical, Q12H  oxyCODONE, 10 mg, Oral, TID  pantoprazole, 40 mg, Oral, Nightly  sevelamer, 800 mg, Oral, TID With Meals  sodium chloride, 10 mL, Intravenous, Q12H      Pharmacy to Dose meropenem (MERREM),         Assessment & Plan   ASSESSMENT / PLAN      ESRD (end stage renal disease) on dialysis    1.  ESRD on hemodialysis- normally dialyzes TTS at [facility] in [place].  Using avf or tunnel cath these days.     continue HD TTS. Fistula was used today. Blood flow is 250 and using 17 G needle      2.  Hyperkalemia- now better     3.  Anemia of CKD hgb stable     4.  CAD     5.  DM2     6.  CKD-MBD     7.  Hypertension- address with HD today. On coreg and Cozaar. Consider increase of Cozaar to 50 mg daily.      8. uti on rocephin             This document has been electronically signed  by Edis Goldsmith MD on May 16, 2023 08:12 CDT      I have reviewed the case with the resident. I have also examined and seen  the patient.  I agree with the assessment and plan as documented in the resident's note.         This document has been electronically signed by Ace Lauren MD on May 16, 2023 10:56 CDT

## 2023-05-17 ENCOUNTER — HOME CARE VISIT (OUTPATIENT)
Dept: HOME HEALTH SERVICES | Facility: HOME HEALTHCARE | Age: 63
End: 2023-05-17
Payer: COMMERCIAL

## 2023-05-17 LAB
ANION GAP SERPL CALCULATED.3IONS-SCNC: 18 MMOL/L (ref 5–15)
BASOPHILS # BLD AUTO: 0.11 10*3/MM3 (ref 0–0.2)
BASOPHILS NFR BLD AUTO: 0.9 % (ref 0–1.5)
BUN SERPL-MCNC: 51 MG/DL (ref 8–23)
BUN/CREAT SERPL: 9 (ref 7–25)
CALCIUM SPEC-SCNC: 10.2 MG/DL (ref 8.6–10.5)
CHLORIDE SERPL-SCNC: 91 MMOL/L (ref 98–107)
CO2 SERPL-SCNC: 25 MMOL/L (ref 22–29)
CREAT SERPL-MCNC: 5.69 MG/DL (ref 0.57–1)
DEPRECATED RDW RBC AUTO: 46.5 FL (ref 37–54)
EGFRCR SERPLBLD CKD-EPI 2021: 7.8 ML/MIN/1.73
EOSINOPHIL # BLD AUTO: 0.25 10*3/MM3 (ref 0–0.4)
EOSINOPHIL NFR BLD AUTO: 2.2 % (ref 0.3–6.2)
ERYTHROCYTE [DISTWIDTH] IN BLOOD BY AUTOMATED COUNT: 14.5 % (ref 12.3–15.4)
GLUCOSE BLDC GLUCOMTR-MCNC: 153 MG/DL (ref 70–130)
GLUCOSE BLDC GLUCOMTR-MCNC: 239 MG/DL (ref 70–130)
GLUCOSE BLDC GLUCOMTR-MCNC: 277 MG/DL (ref 70–130)
GLUCOSE BLDC GLUCOMTR-MCNC: 283 MG/DL (ref 70–130)
GLUCOSE BLDC GLUCOMTR-MCNC: 305 MG/DL (ref 70–130)
GLUCOSE BLDC GLUCOMTR-MCNC: 347 MG/DL (ref 70–130)
GLUCOSE SERPL-MCNC: 283 MG/DL (ref 65–99)
HCT VFR BLD AUTO: 39.7 % (ref 34–46.6)
HGB BLD-MCNC: 12.9 G/DL (ref 12–15.9)
IMM GRANULOCYTES # BLD AUTO: 0.13 10*3/MM3 (ref 0–0.05)
IMM GRANULOCYTES NFR BLD AUTO: 1.1 % (ref 0–0.5)
LYMPHOCYTES # BLD AUTO: 3.02 10*3/MM3 (ref 0.7–3.1)
LYMPHOCYTES NFR BLD AUTO: 26 % (ref 19.6–45.3)
MCH RBC QN AUTO: 28.5 PG (ref 26.6–33)
MCHC RBC AUTO-ENTMCNC: 32.5 G/DL (ref 31.5–35.7)
MCV RBC AUTO: 87.6 FL (ref 79–97)
MONOCYTES # BLD AUTO: 0.86 10*3/MM3 (ref 0.1–0.9)
MONOCYTES NFR BLD AUTO: 7.4 % (ref 5–12)
NEUTROPHILS NFR BLD AUTO: 62.4 % (ref 42.7–76)
NEUTROPHILS NFR BLD AUTO: 7.24 10*3/MM3 (ref 1.7–7)
NRBC BLD AUTO-RTO: 0 /100 WBC (ref 0–0.2)
PLATELET # BLD AUTO: 217 10*3/MM3 (ref 140–450)
PMV BLD AUTO: 8.1 FL (ref 6–12)
POTASSIUM SERPL-SCNC: 4.4 MMOL/L (ref 3.5–5.2)
RBC # BLD AUTO: 4.53 10*6/MM3 (ref 3.77–5.28)
SODIUM SERPL-SCNC: 134 MMOL/L (ref 136–145)
WBC NRBC COR # BLD: 11.61 10*3/MM3 (ref 3.4–10.8)

## 2023-05-17 PROCEDURE — 97605 NEG PRS WND THER DME<=50SQCM: CPT | Performed by: NURSE PRACTITIONER

## 2023-05-17 PROCEDURE — 96366 THER/PROPH/DIAG IV INF ADDON: CPT

## 2023-05-17 PROCEDURE — 80048 BASIC METABOLIC PNL TOTAL CA: CPT | Performed by: STUDENT IN AN ORGANIZED HEALTH CARE EDUCATION/TRAINING PROGRAM

## 2023-05-17 PROCEDURE — 25010000002 HEPARIN (PORCINE) PER 1000 UNITS: Performed by: HOSPITALIST

## 2023-05-17 PROCEDURE — G0378 HOSPITAL OBSERVATION PER HR: HCPCS

## 2023-05-17 PROCEDURE — 63710000001 INSULIN ASPART PER 5 UNITS: Performed by: HOSPITALIST

## 2023-05-17 PROCEDURE — 82948 REAGENT STRIP/BLOOD GLUCOSE: CPT

## 2023-05-17 PROCEDURE — 85025 COMPLETE CBC W/AUTO DIFF WBC: CPT | Performed by: STUDENT IN AN ORGANIZED HEALTH CARE EDUCATION/TRAINING PROGRAM

## 2023-05-17 PROCEDURE — 96372 THER/PROPH/DIAG INJ SC/IM: CPT

## 2023-05-17 PROCEDURE — 97166 OT EVAL MOD COMPLEX 45 MIN: CPT

## 2023-05-17 PROCEDURE — 97162 PT EVAL MOD COMPLEX 30 MIN: CPT

## 2023-05-17 PROCEDURE — 25010000002 MEROPENEM PER 100 MG: Performed by: HOSPITALIST

## 2023-05-17 PROCEDURE — 63710000001 INSULIN DETEMIR PER 5 UNITS: Performed by: HOSPITALIST

## 2023-05-17 RX ORDER — LIDOCAINE AND PRILOCAINE 25; 25 MG/G; MG/G
CREAM TOPICAL AS NEEDED
OUTPATIENT
Start: 2023-05-17

## 2023-05-17 RX ORDER — HEPARIN SODIUM 1000 [USP'U]/ML
2000 INJECTION, SOLUTION INTRAVENOUS; SUBCUTANEOUS AS NEEDED
OUTPATIENT
Start: 2023-05-18

## 2023-05-17 RX ORDER — ALBUMIN (HUMAN) 12.5 G/50ML
12.5 SOLUTION INTRAVENOUS AS NEEDED
OUTPATIENT
Start: 2023-05-18 | End: 2023-05-18

## 2023-05-17 RX ADMIN — SEVELAMER CARBONATE 800 MG: 800 TABLET, FILM COATED ORAL at 11:28

## 2023-05-17 RX ADMIN — INSULIN ASPART 14 UNITS: 100 INJECTION, SOLUTION INTRAVENOUS; SUBCUTANEOUS at 08:08

## 2023-05-17 RX ADMIN — INSULIN ASPART 14 UNITS: 100 INJECTION, SOLUTION INTRAVENOUS; SUBCUTANEOUS at 11:29

## 2023-05-17 RX ADMIN — Medication 10 ML: at 22:09

## 2023-05-17 RX ADMIN — INSULIN ASPART 6 UNITS: 100 INJECTION, SOLUTION INTRAVENOUS; SUBCUTANEOUS at 17:42

## 2023-05-17 RX ADMIN — HEPARIN SODIUM 5000 UNITS: 5000 INJECTION INTRAVENOUS; SUBCUTANEOUS at 22:07

## 2023-05-17 RX ADMIN — CETIRIZINE HYDROCHLORIDE 5 MG: 5 TABLET ORAL at 08:08

## 2023-05-17 RX ADMIN — Medication 10 ML: at 08:13

## 2023-05-17 RX ADMIN — OXYCODONE HYDROCHLORIDE 10 MG: 5 TABLET ORAL at 17:42

## 2023-05-17 RX ADMIN — CYCLOBENZAPRINE 5 MG: 5 TABLET, FILM COATED ORAL at 22:07

## 2023-05-17 RX ADMIN — NYSTATIN: 100000 POWDER TOPICAL at 08:13

## 2023-05-17 RX ADMIN — OXYCODONE HYDROCHLORIDE 10 MG: 5 TABLET ORAL at 08:08

## 2023-05-17 RX ADMIN — DULOXETINE HYDROCHLORIDE 20 MG: 20 CAPSULE, DELAYED RELEASE ORAL at 08:08

## 2023-05-17 RX ADMIN — NYSTATIN: 100000 POWDER TOPICAL at 22:09

## 2023-05-17 RX ADMIN — DOCUSATE SODIUM 100 MG: 100 CAPSULE, LIQUID FILLED ORAL at 22:07

## 2023-05-17 RX ADMIN — PANTOPRAZOLE SODIUM 40 MG: 40 TABLET, DELAYED RELEASE ORAL at 22:07

## 2023-05-17 RX ADMIN — INSULIN DETEMIR 24 UNITS: 100 INJECTION, SOLUTION SUBCUTANEOUS at 22:06

## 2023-05-17 RX ADMIN — INSULIN ASPART 6 UNITS: 100 INJECTION, SOLUTION INTRAVENOUS; SUBCUTANEOUS at 08:08

## 2023-05-17 RX ADMIN — CYCLOBENZAPRINE 5 MG: 5 TABLET, FILM COATED ORAL at 08:07

## 2023-05-17 RX ADMIN — DOCUSATE SODIUM 100 MG: 100 CAPSULE, LIQUID FILLED ORAL at 08:07

## 2023-05-17 RX ADMIN — HYDROXYZINE HYDROCHLORIDE 25 MG: 25 TABLET, FILM COATED ORAL at 08:08

## 2023-05-17 RX ADMIN — INSULIN ASPART 7 UNITS: 100 INJECTION, SOLUTION INTRAVENOUS; SUBCUTANEOUS at 11:29

## 2023-05-17 RX ADMIN — INSULIN ASPART 14 UNITS: 100 INJECTION, SOLUTION INTRAVENOUS; SUBCUTANEOUS at 17:42

## 2023-05-17 RX ADMIN — CARVEDILOL 6.25 MG: 6.25 TABLET, FILM COATED ORAL at 08:08

## 2023-05-17 RX ADMIN — OXYCODONE HYDROCHLORIDE 10 MG: 5 TABLET ORAL at 22:07

## 2023-05-17 RX ADMIN — MEMANTINE 5 MG: 5 TABLET ORAL at 22:08

## 2023-05-17 RX ADMIN — CARVEDILOL 6.25 MG: 6.25 TABLET, FILM COATED ORAL at 17:42

## 2023-05-17 RX ADMIN — HEPARIN SODIUM 5000 UNITS: 5000 INJECTION INTRAVENOUS; SUBCUTANEOUS at 08:08

## 2023-05-17 RX ADMIN — CLOPIDOGREL BISULFATE 75 MG: 75 TABLET ORAL at 08:07

## 2023-05-17 RX ADMIN — MEROPENEM 500 MG: 500 INJECTION, POWDER, FOR SOLUTION INTRAVENOUS at 13:28

## 2023-05-17 RX ADMIN — LOSARTAN POTASSIUM 50 MG: 50 TABLET, FILM COATED ORAL at 22:07

## 2023-05-17 RX ADMIN — SEVELAMER CARBONATE 800 MG: 800 TABLET, FILM COATED ORAL at 17:42

## 2023-05-17 RX ADMIN — MEMANTINE 5 MG: 5 TABLET ORAL at 08:08

## 2023-05-17 RX ADMIN — SEVELAMER CARBONATE 800 MG: 800 TABLET, FILM COATED ORAL at 08:07

## 2023-05-17 RX ADMIN — ATORVASTATIN CALCIUM 40 MG: 40 TABLET, FILM COATED ORAL at 08:08

## 2023-05-17 NOTE — PLAN OF CARE
"Goal Outcome Evaluation:  Plan of Care Reviewed With: patient           Outcome Evaluation: OT eval completed as co-eval with PT. Per PT note \"Spoke with wound care patient is WBAT thru R heel, she plans to put orders in.\". Pt seated in w/c on entry, wound vac on R toe. Pt used R heel to pull w/c to opposite side of bed for chair>bed t/f. Pt required assistance with line management. Pt dependent to don<>doff surgical shoe on R foot. min A required to sit<>stand and pivot from w/c to bed. SBA to don L residual limb compression stocking. SBA sit>sup. Pt would benefit from continued skilled IP/OT to address decreased strength, safety, and independence with ADLs, IADLs, and transfers. Recommended home with assist and outpatient therapy PRN.         "

## 2023-05-17 NOTE — PLAN OF CARE
Goal Outcome Evaluation:  Plan of Care Reviewed With: patient           Outcome Evaluation: PT eval completed. Spoke with wound care patient is WBAT thru R heel, she plans to put orders in. Patient reports her sister lives with her and she lives in the senior apartments which are wheelchair acessible. MinAx1 for w/c>bed stand pivot with front of w/c facing the bed. SBA for sit>supine transfer with HOB up and bed rails. Patient reports she cannot breath if laying flat. Strongly encouraged patient to get L prosthesis into hospital to improve transfer and decrease fall risk. Anticipate home with assist and resume therapy for the L prosthesis.      PT Evaluation Complexity  History, PT Evaluation Complexity: 3 or more personal factors and/or comorbidities  Examination of Body Systems (PT Eval Complexity): total of 4 or more elements  Clinical Presentation (PT Evaluation Complexity): evolving  Clinical Decision Making (PT Evaluation Complexity): moderate complexity  Overall Complexity (PT Evaluation Complexity): moderate complexity

## 2023-05-17 NOTE — PLAN OF CARE
Problem: Adult Inpatient Plan of Care  Goal: Plan of Care Review  Outcome: Ongoing, Progressing  Flowsheets (Taken 5/17/2023 1305)  Plan of Care Reviewed With: patient   Goal Outcome Evaluation:  Plan of Care Reviewed With: patient      Reason for RD visit:  Length of stay  Appetite:  Good appetite  Chewing/swallowing difficulty:  denies  Food allergies:  denies  N/V:  Had yesterday with emesis x4 after dialysis  Change in BMs:  Have gotten softer but was having really hard stool  Change in Wt:  Was 315# 4 months ago per pt.  Pt states she is happy with the wt loss.   CBW:  237#    Pt has noted wound vac to surgical wound on foot.  Also has noted gluteal wound.  Discussed increased nutrient needs r/t.  Pt agreed to try trinity.  Pt has HD TTS.  RD staff will follow hospital course.

## 2023-05-17 NOTE — PROGRESS NOTES
"[facility]  [address] [place] [address]. [address]  T - [phone]  F - [phone]     Progress Note          PATIENT  DEMOGRAPHICS   PATIENT NAME: Yamileth Slater                      PHYSICIAN: Edis Goldsmith MD  : 1960  MRN: [id number]   LOS: 4 days    Patient Care Team:  Kiersten Wilson APRN as PCP - General (Family Medicine)  Subjective   SUBJECTIVE   Patient reports that she has only urinated once since yesterday and is having indigestion this morning.          Objective   OBJECTIVE   Vital Signs  Temp:  [96.6 °F (35.9 °C)-98.1 °F (36.7 °C)] 98.1 °F (36.7 °C)  Heart Rate:  [66-88] 88  Resp:  [16-18] 16  BP: ()/(52-85) 141/78    Flowsheet Rows    Flowsheet Row First Filed Value   Admission Height 157.5 cm (62\") Documented at 2023 1051   Admission Weight 111 kg (245 lb) Documented at 2023 1051           I/O last 3 completed shifts:  In: 580 [P.O.:580]  Out: 4400 [Urine:1700; Other:2700]    PHYSICAL EXAM    Physical Exam  Vitals reviewed.   Constitutional:       General: She is not in acute distress.     Appearance: Normal appearance. She is not ill-appearing or diaphoretic.      Comments: Sitting up in bed looking uncomfortable   Eyes:      Conjunctiva/sclera: Conjunctivae normal.   Cardiovascular:      Rate and Rhythm: Normal rate. Rhythm irregular.      Pulses: Normal pulses.      Heart sounds: Normal heart sounds.   Pulmonary:      Breath sounds: No wheezing, rhonchi or rales.   Abdominal:      Tenderness: There is abdominal tenderness.   Musculoskeletal:      Right lower leg: No edema.      Left lower leg: No edema.   Skin:     General: Skin is warm and dry.      Findings: Bruising present.   Neurological:      Mental Status: She is alert.   Psychiatric:         Mood and Affect: Mood normal.         Behavior: Behavior normal.         RESULTS   Results Review:    Results from last 7 days   Lab Units 23  0552 23  0616 " 05/12/23  0734 05/11/23  1038   SODIUM mmol/L 133* 135* 134* 139   POTASSIUM mmol/L 4.2 5.2 4.9 6.5*   CHLORIDE mmol/L 91* 99 96* 104   CO2 mmol/L 25.0 23.0 24.0 19.0*   BUN mg/dL 36* 62* 48* 85*   CREATININE mg/dL 4.14* 5.78* 4.52* 7.00*   CALCIUM mg/dL 10.1 9.8 9.7 9.4   BILIRUBIN mg/dL  --   --   --  0.4   ALK PHOS U/L  --   --   --  282*   ALT (SGPT) U/L  --   --   --  13   AST (SGOT) U/L  --   --   --  17   GLUCOSE mg/dL 157* 121* 86 60*       Estimated Creatinine Clearance: 15.9 mL/min (A) (by C-G formula based on SCr of 4.14 mg/dL (H)).    Results from last 7 days   Lab Units 05/11/23  1038   MAGNESIUM mg/dL 2.1             Results from last 7 days   Lab Units 05/14/23  0552 05/13/23  0616 05/12/23  0734 05/11/23  1038   WBC 10*3/mm3 7.83 6.59 6.04 6.21   HEMOGLOBIN g/dL 11.5* 9.7* 10.3* 8.7*   PLATELETS 10*3/mm3 238 208 233 187               Imaging Results (Last 24 Hours)     ** No results found for the last 24 hours. **           MEDICATIONS    atorvastatin, 40 mg, Oral, Daily  carvedilol, 6.25 mg, Oral, BID With Meals  cetirizine, 5 mg, Oral, Daily  clopidogrel, 75 mg, Oral, Daily  cyclobenzaprine, 5 mg, Oral, BID  docusate sodium, 100 mg, Oral, BID  DULoxetine, 20 mg, Oral, Daily  heparin (porcine), 5,000 Units, Subcutaneous, Q12H  Insulin Aspart, 0-9 Units, Subcutaneous, TID AC  insulin aspart, 14 Units, Subcutaneous, TID With Meals  insulin detemir, 24 Units, Subcutaneous, Nightly  losartan, 50 mg, Oral, Nightly  memantine, 5 mg, Oral, BID  meropenem, 500 mg, Intravenous, Q24H  nystatin, , Topical, Q12H  oxyCODONE, 10 mg, Oral, TID  pantoprazole, 40 mg, Oral, Nightly  sevelamer, 800 mg, Oral, TID With Meals  sodium chloride, 10 mL, Intravenous, Q12H      Pharmacy to Dose meropenem (MERREM),         Assessment & Plan   ASSESSMENT / PLAN      ESRD (end stage renal disease) on dialysis    1.  ESRD on hemodialysis- normally dialyzes TTS at [facility] in [place].  Using avf now     continue HD TTS.      2.  Hyperkalemia- resolved     3.  Anemia of CKD hgb stable     4.  CAD     5.  DM2     6.  CKD-MBD     7.  Hypertension- Controlled on coreg and Cozaar.      8. ESBL E. Coli- On meropenem, last dose 5/19    9. Discharge planning: Primary team attempting arrangement to LTAC             This document has been electronically signed by Edis Goldsmith MD on May 17, 2023 06:35 CDT      I have reviewed the case with the resident. I have also examined and seen  the patient.  I agree with the assessment and plan as documented in the resident's note.         This document has been electronically signed by Ace Lauren MD on May 17, 2023 10:52 CDT

## 2023-05-17 NOTE — PROGRESS NOTES
[facility] Medicine Services  INPATIENT PROGRESS NOTE    Length of Stay: 4  Date of Admission: 5/11/2023  Primary Care Physician: Kiersten Wilson APRN    Subjective   Chief Complaint: Confusion  HPI:     Doing ok. States she felt a little sick with dialysis yesterday but that is better today. Sitting in wheelchair. No new complaints.     As of today, 05/17/23  Review of Systems   Constitutional: Negative for appetite change, chills, fatigue, fever and unexpected weight change.   Respiratory: Negative for cough, choking, chest tightness, shortness of breath and wheezing.    Cardiovascular: Negative for chest pain, palpitations and leg swelling.   Gastrointestinal: Negative for abdominal pain, blood in stool, constipation, diarrhea, nausea and vomiting.   Genitourinary: Negative for dysuria, flank pain and hematuria.   Musculoskeletal: Positive for back pain and neck pain.   Neurological: Negative for dizziness, seizures, syncope, speech difficulty, weakness, light-headedness, numbness and headaches.   Hematological: Does not bruise/bleed easily.        All pertinent negatives and positives are as above. All other systems have been reviewed and are negative unless otherwise stated.    Objective    Temp:  [96.6 °F (35.9 °C)-98.1 °F (36.7 °C)] 97.2 °F (36.2 °C)  Heart Rate:  [66-88] 75  Resp:  [16-18] 16  BP: ()/(53-82) 138/75    AM-PAC 6 Clicks Score (PT): 14 (05/17/23 0807)    As of today, 05/17/23  Physical Exam  Vitals reviewed.   Constitutional:       Appearance: She is well-developed.   HENT:      Head: Normocephalic and atraumatic.   Eyes:      Pupils: Pupils are equal, round, and reactive to light.   Cardiovascular:      Rate and Rhythm: Normal rate and regular rhythm.      Heart sounds: Normal heart sounds. No murmur heard.    No friction rub. No gallop.   Pulmonary:      Effort: Pulmonary effort is normal. No respiratory distress.      Breath  sounds: Normal breath sounds. No wheezing or rales.   Chest:      Chest wall: No tenderness.   Abdominal:      General: Bowel sounds are normal. There is no distension.      Palpations: Abdomen is soft.      Tenderness: There is no abdominal tenderness. There is no guarding.   Musculoskeletal:      Cervical back: Normal range of motion and neck supple.      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   Neurological:      Comments: More oriented today.     Psychiatric:         Behavior: Behavior normal.         Thought Content: Thought content normal.           Results Review:  I have reviewed the labs, radiology results, and diagnostic studies.    Laboratory Data:   Results from last 7 days   Lab Units 05/14/23  0552 05/13/23  0616 05/12/23 0734 05/11/23  1038   SODIUM mmol/L 133* 135* 134* 139   POTASSIUM mmol/L 4.2 5.2 4.9 6.5*   CHLORIDE mmol/L 91* 99 96* 104   CO2 mmol/L 25.0 23.0 24.0 19.0*   BUN mg/dL 36* 62* 48* 85*   CREATININE mg/dL 4.14* 5.78* 4.52* 7.00*   GLUCOSE mg/dL 157* 121* 86 60*   CALCIUM mg/dL 10.1 9.8 9.7 9.4   BILIRUBIN mg/dL  --   --   --  0.4   ALK PHOS U/L  --   --   --  282*   ALT (SGPT) U/L  --   --   --  13   AST (SGOT) U/L  --   --   --  17   ANION GAP mmol/L 17.0* 13.0 14.0 16.0*     Estimated Creatinine Clearance: 15.9 mL/min (A) (by C-G formula based on SCr of 4.14 mg/dL (H)).  Results from last 7 days   Lab Units 05/11/23  1038   MAGNESIUM mg/dL 2.1         Results from last 7 days   Lab Units 05/14/23  0552 05/13/23  0616 05/12/23  0734 05/11/23  1038   WBC 10*3/mm3 7.83 6.59 6.04 6.21   HEMOGLOBIN g/dL 11.5* 9.7* 10.3* 8.7*   HEMATOCRIT % 35.9 30.1* 32.7* 28.7*   PLATELETS 10*3/mm3 238 208 233 187           Culture Data:   No results found for: BLOODCX  No results found for: URINECX  No results found for: RESPCX  No results found for: WOUNDCX  No results found for: STOOLCX  No components found for:  BODYFLD    Radiology Data:   Imaging Results (Last 24 Hours)     ** No results found for the last 24 hours. **          I have utilized all available immediate resources to obtain, update, or review the patient's current medications (including all prescriptions, over-the-counter products, herbals, cannabis/cannabidiol products, and vitamin/mineral/dietary (nutritional) supplements).       Assessment/Plan     Active Hospital Problems    Diagnosis    • **ESRD (end stage renal disease) on dialysis        Plan:    1.  Altered mental status: Most likely multifactorial.  Likely secondary to inflammation of urinary tract infection and uremia. Appears to be at baseline.   2.  End-stage renal disease: Appreciate nephrology help.  Dialyzed Tuesday, Thursday, and Saturday.  3.  Hyperkalemia: Management with hemodialysis.  4.  Coronary artery disease: Continue home medication.  5.  Urinary tract infection: Urine culture shows ESBL E. coli.  Continue meropenem.  Last dose 5/19.  She will likely remain here until final dose of antibiotic.   6.  Diabetes mellitus: Long-acting insulin and sliding scale insulin.  7.  Morbid obesity  8.  DVT prophylaxis: Heparin.    Check labs today.     Medical Decision Making  Number and Complexity of problems: 2 highly complex medical problems    Conditions and Status:        Condition is improving.       Discussed with: Patient     Treatment Plan  As above    Care Planning  Shared decision making: Patient in agreement with plan of care  Code status and discussions: Full code    Disposition  Social Determinants of Health that impact treatment or disposition: None    Likely d/c after last dose of antibiotic. PT/OT ordered.     I confirmed that the patient's Advance Care Plan is present, code status is documented, or surrogate decision maker is listed in the patient's medical record.        This document has been electronically signed by Cintia Simms MD on May 17, 2023 10:32 CDT

## 2023-05-17 NOTE — THERAPY EVALUATION
Patient Name: Yamileth Slater  : 1960    MRN: [id number]                              Today's Date: 2023       Admit Date: 2023    Visit Dx:     ICD-10-CM ICD-9-CM   1. ESRD (end stage renal disease) on dialysis  N18.6 585.6    Z99.2 V45.11   2. Hyperkalemia  E87.5 276.7   3. Urinary tract infection in female  N39.0 599.0   4. Impaired functional mobility, balance, gait, and endurance  Z74.09 V49.89   5. Impaired mobility and ADLs  Z74.09 V49.89    Z78.9      Patient Active Problem List   Diagnosis   • Chronic midline low back pain without sciatica   • Vitamin D deficiency   • Tobacco dependence syndrome   • Shoulder joint pain   • Morbid obesity with BMI of 50.0-59.9, adult   • Mixed hyperlipidemia   • Kidney stone   • History of colon polyps   • GERD (gastroesophageal reflux disease)   • Excoriated eczema   • Hypertension   • COPD (chronic obstructive pulmonary disease)   • Chronic folliculitis   • Coronary artery disease involving native coronary artery of native heart without angina pectoris   • 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   • Hypothyroidism   • Carotid artery disease   • [place] abuse   • PAD (peripheral artery disease) ([facility])   • Venous insufficiency   • LAFB (left anterior fascicular block)   • Pulmonary hypertension (HCC)   • Left hip pain   • Neck pain   • MIKE and COPD overlap syndrome   • Pneumonia due to COVID-19 virus   • Hyperkalemia   • Cutaneous candidiasis   • Community acquired pneumonia of right middle lobe of lung   • MRSA infection   • Esophageal dysphagia   • Monilial esophagitis   • NSTEMI, initial episode of care   • CAD (coronary artery disease)   • Cellulitis of foot associated with diabetes mellitus   • Urinary tract infection due to Proteus   • History of insertion of tunneled central venous catheter (CVC) with port   • Anemia due to chronic kidney disease, on chronic dialysis   • Pneumonitis   • Personal history of noncompliance with  medical treatment, presenting hazards to health   • Type 2 diabetes mellitus with circulatory disorder   • Physical deconditioning   • ESRD on hemodialysis   • DVT prophylaxis   • Anemia   • Ventilator associated pneumonia   • Positive fecal occult blood test   • Yeast UTI   • Gangrene of both feet   • Left foot pain   • 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   • On home oxygen therapy   • Subacute osteomyelitis of right foot   • Acute respiratory failure with hypoxia   • ESRD (end stage renal disease) on dialysis     Past Medical History:   Diagnosis Date   • Acute blood loss anemia 04/16/2017    Likely due to gastric oozing at this time. - Dr. Duarte (GI) was consulted and has now signed off, will follow up outpatient - pill colonoscopy showed AVMs - continue to monitor   • Acute cystitis with hematuria 03/31/2021 1/13: IV Rocephin 1 gm q 24 1/14 : transitioned  to omnicef 300mg. Urine cultures resulted and did not show growth. Omnicef discontinued as patient is asymptomatic   • Altered mental status 01/09/2022    - AMS on presentation - initial ABG pH 7.3, CO2 34 - Procal 0.29 - UA negative for acute cysitits -CTA head wnl  - Empiric Zosyn and Vancomycin -Lactate 2.5 on admission  - blood cultures no growth at 24 hours     • Anxiety    • CAD (coronary artery disease) 04/24/2021    S/P 3 stents 5/1/2021 for BHL Continue ASA 81mg & Clopidogrel 75mg Continue Atorvastatin 40mg   • Carotid artery stenosis    • CHF (congestive heart failure)    • Chronic obstructive lung disease    • CKD (chronic kidney disease) stage 4, GFR 15-29 ml/min    • CKD (chronic kidney disease), symptom management only, stage 5 10/05/2020    Results from last 7 days Lab Units 12/15/21 0548 12/14/21 1323 12/14/21 0916 CREATININE mg/dL 3.92* 3.21* 3.32*  Baseline creatinine 2-3 GFR 13-25 GFR 15 Dialysis MWF, sees Dr. Lauren Nephrology consult,, appreciate recommendations Continue Bumex 1mg bid daily Holding Bumex 2mg 4 times a week    • Colonic polyp    • Coronary arteriosclerosis    • Diabetes mellitus    • Diabetic neuropathy    • Ear pain, right 10/18/2021    - canal trauma due to patient scratching and DMT2 - added cortisporin ear drops   • Elevated troponin 10/12/2021    -most likely from CKD -Trending down -Neg chest pain   • Generalized abdominal pain 07/01/2022    Could be due to initiation of tube feeds vs dyspepsia vs abdominal cramps related to no PO intake due to intubation vs constipation Continue current laxative regiment  If no bowel movement by this afternoon will consider enema   • GERD (gastroesophageal reflux disease)    • GI bleed 05/13/2021    - GI will follow up outpatient - Protonix 40mg daily - Avoid medical DVT prophy and use mechanical at this time instead. - Continue to monitor - pill colonoscopy results showed AVMs   • History of transfusion    • Hypercholesterolemia    • Hyperosmolar hyperglycemic state (HHS) 06/25/2022    Serum glucose 605 on admission  Anion gap 16 PH 7.37 Bicarb 27.9 Continue fluids  Insulin drip with HHS protocol  Anion gap closed around 10 AM, received one dose of Levamir subq, will stop insulin drip after 2 hrs     • Hypertension    • Hypokalemia 05/27/2022    Will replace as needed. Will be cautious in the setting of ESRD to avoid need for emergency dialysis   Monitor Qtc intervals on EKG     • Hypomagnesemia 06/27/2021    Monitor and replace   • Morbid obesity    • Nephrolithiasis    • On mechanically assisted ventilation 06/26/2022    Vent management and sedation orders placed.  - [facility] intensivist group consulted for vent management appreciate recommendations  - plan to extubate today     • Peripheral vascular disease    • Pleural effusion on right 06/26/2022    CXR on 6/30/22 read as a small upper left pulmonary edema vs early pneumonia.  Last Echo 1/2022 EF 61-65 % Continue to monitor  Procal slightly improved, CRP improved On Linezolid and meropenum      • PVD (peripheral  vascular disease)    • SIRS (systemic inflammatory response syndrome) 01/09/2022    Admission  - WBC 17.78   -   - RR 16 - 1/10: VSS/wnl - CXR - Mild pulm edema - Blood cultures no growth at 24 hours  - Procalcitonin 0.29 - UA : glucose 1000, negative Leucocytes/nitrate - Empiric Zosyn and Vancomcyin    • Sleep apnea    • Substance abuse    • Vitamin D deficiency      Past Surgical History:   Procedure Laterality Date   • AMPUTATION DIGIT Right 2/27/2023    Procedure: Excision of right first metatarsal head, right second toe amputation;  Surgeon: Jean Oliveira DPM;  Location: [facility] OR;  Service: Podiatry;  Laterality: Right;   • BELOW KNEE AMPUTATION Left 12/16/2022    Procedure: AMPUTATION BELOW KNEE, LEFT;  Surgeon: Huy White MD;  Location: [facility] OR;  Service: Orthopedics;  Laterality: Left;   • CARDIAC CATHETERIZATION N/A 07/14/2020   • CARDIAC CATHETERIZATION N/A 04/23/2021    Procedure: Left Heart Cath;  Surgeon: Melba Romo MD;  Location: [facility] CATH INVASIVE LOCATION;  Service: Cardiology;  Laterality: N/A;   • CARDIAC CATHETERIZATION N/A 04/30/2021    Procedure: Percutaneous Coronary Intervention;  Surgeon: Russell Voss MD;  Location: [facility] CATH INVASIVE LOCATION;  Service: Cardiovascular;  Laterality: N/A;   • CARDIAC CATHETERIZATION N/A 04/30/2021    Procedure: Stent NIKKI coronary;  Surgeon: Russell Voss MD;  Location: [facility] CATH INVASIVE LOCATION;  Service: Cardiovascular;  Laterality: N/A;   • CARDIAC CATHETERIZATION Left 11/13/2021    Procedure: Left Heart Cath;  Surgeon: Niall Rios MD;  Location: [facility] CATH INVASIVE LOCATION;  Service: Cardiology;  Laterality: Left;   • CAROTID STENT Left    • COLONOSCOPY     • COLONOSCOPY N/A 05/14/2021    Procedure: COLONOSCOPY;  Surgeon: Mingo Duarte MD;  Location: [facility] ENDOSCOPY;  Service: Gastroenterology;  Laterality: N/A;   • CORONARY ARTERY BYPASS GRAFT N/A 2013    CABG X 3   • CYSTOSCOPY  BLADDER STONE LITHOTRIPSY Bilateral    • ENDOSCOPY N/A 04/12/2021    Procedure: ESOPHAGOGASTRODUODENOSCOPY;  Surgeon: Mingo Duarte MD;  Location: [facility] ENDOSCOPY;  Service: Gastroenterology;  Laterality: N/A;   • ENDOSCOPY N/A 05/14/2021    Procedure: ESOPHAGOGASTRODUODENOSCOPY;  Surgeon: Mingo Duarte MD;  Location: [facility] ENDOSCOPY;  Service: Gastroenterology;  Laterality: N/A;   • ENDOSCOPY N/A 01/28/2022    Procedure: ESOPHAGOGASTRODUODENOSCOPY;  Surgeon: Mingo Duarte MD;  Location: [facility] ENDOSCOPY;  Service: Gastroenterology;  Laterality: N/A;   • HYSTERECTOMY     • INTERVENTIONAL RADIOLOGY PROCEDURE N/A 10/21/2021    Procedure: tunneled central venous catheter placement;  Surgeon: Donnie Robles MD;  Location: [facility] ANGIO INVASIVE LOCATION;  Service: Interventional Radiology;  Laterality: N/A;   • INTERVENTIONAL RADIOLOGY PROCEDURE N/A 01/27/2022    Procedure: tunneled central venous catheter placement;  Surgeon: Donnie Robles MD;  Location: [facility] ANGIO INVASIVE LOCATION;  Service: Interventional Radiology;  Laterality: N/A;   • LAPAROSCOPIC TUBAL LIGATION     • TUNNELED VENOUS CATHETER PLACEMENT        General Information     Row Name 05/17/23 1321          OT Time and Intention    Document Type evaluation  -SA     Mode of Treatment physical therapy;occupational therapy  -     Row Name 05/17/23 1321          General Information    Patient Profile Reviewed yes  -SA     Prior Level of Function independent:;all household mobility;community mobility;gait;transfer;w/c or scooter;bed mobility;ADL's  -SA     Existing Precautions/Restrictions fall  -SA     Barriers to Rehab medically complex;previous functional deficit  -SA     Row Name 05/17/23 1321          Living Environment    People in Home sibling(s);other (see comments)  sister  -SA     Row Name 05/17/23 1321          Home Main Entrance    Number of Stairs, Main Entrance none  -SA     Row Name 05/17/23 1321           Stairs Within Home, Primary    Stairs, Within Home, Primary Has, manual w/c, L prosthesis she just got on 5.11.23, RW, rollator, BSC, slideboard, Tub/shower with Shower chair, fell out of chair when asleep. elevated toilet seat with GB  -SA     Number of Stairs, Within Home, Primary none  -SA     Row Name 05/17/23 132          Cognition    Orientation Status (Cognition) oriented x 4  -SA     Row Name 05/17/23 132          Safety Issues, Functional Mobility    Safety Issues Affecting Function (Mobility) ability to follow commands;at risk behavior observed;awareness of need for assistance;insight into deficits/self-awareness;judgment;positioning of assistive device;problem-solving;safety precaution awareness;safety precautions follow-through/compliance  -     Impairments Affecting Function (Mobility) balance;endurance/activity tolerance;strength  -           User Key  (r) = Recorded By, (t) = Taken By, (c) = Cosigned By    Initials Name Provider Type    SA Deanna Quiles OT Occupational Therapist                 Mobility/ADL's     Row Name 05/17/23 132          Bed Mobility    Bed Mobility sit-supine  -     Sit-Supine [place] (Bed Mobility) standby assist  -     Row Name 05/17/23 132          Transfers    Transfers bed-chair transfer  -     Row Name 05/17/23 132          Bed-Chair Transfer    Bed-Chair [place] (Transfers) minimum assist (75% patient effort)  -     Comment, (Bed-Chair Transfer) W/c pivot to bed  -SA     Row Name 05/17/23 132          Functional Mobility    Functional Mobility- Ind. Level conditional independence  -     Functional Mobility- Device other (see comments)  wheel chair  -SA     Functional Mobility-Distance (Feet) 10  -SA     Row Name 05/17/23 132          Activities of Daily Living    BADL Assessment/Intervention lower body dressing  -     Row Name 05/17/23 132          Mobility    Extremity Weight-bearing Status right lower extremity  -SA     Right Lower  Extremity (Weight-bearing Status) partial weight-bearing (PWB);other (see comments)  WB through heel  -[facility] Name 05/17/23 1321          Lower Body Dressing Assessment/Training    [place] Level (Lower Body Dressing) doff;don;shoes/slippers;dependent (less than 25% patient effort)  -     Position (Lower Body Dressing) supported sitting  -           User Key  (r) = Recorded By, (t) = Taken By, (c) = Cosigned By    Initials Name Provider Type    Deanna Davis OT Occupational Therapist               Obj/Interventions     [facility] Name 05/17/23 1321          Sensory Assessment (Somatosensory)    Sensory Assessment (Somatosensory) left UE;right UE  -     Sensory Subjective Reports numbness;tingling  -     Sensory Assessment Pt reports numbness and tingling in B hands and R foot due to neuropathy  -[facility] Name 05/17/23 1321          Range of Motion Comprehensive    General Range of Motion no range of motion deficits identified  -     Comment, General Range of Motion BUE grossly WFL  -[facility] Name 05/17/23 1321          Strength Comprehensive (MMT)    General Manual Muscle Testing (MMT) Assessment other (see comments)  -     Comment, General Manual Muscle Testing (MMT) Assessment BUE 4/5 grossly  -           User Key  (r) = Recorded By, (t) = Taken By, (c) = Cosigned By    Initials Name Provider Type    Deanna Davis OT Occupational Therapist               Goals/Plan     [facility] Name 05/17/23 1321          Transfer Goal 1 (OT)    Activity/Assistive Device (Transfer Goal 1, OT) commode, bedside without drop arms;walker, rolling;wheelchair transfer  -     [place] Level/Cues Needed (Transfer Goal 1, OT) modified independence  -     Time Frame (Transfer Goal 1, OT) long term goal (LTG);by discharge  -     Progress/Outcome (Transfer Goal 1, OT) goal not met  -[facility] Name 05/17/23 1321          Bathing Goal 1 (OT)    Activity/Device (Bathing Goal 1, OT) lower body bathing  -      [place] Level/Cues Needed (Bathing Goal 1, OT) modified independence  -SA     Time Frame (Bathing Goal 1, OT) long term goal (LTG);by discharge  -SA     Progress/Outcomes (Bathing Goal 1, OT) goal not met  -[facility] Name 05/17/23 1321          Dressing Goal 1 (OT)    Activity/Device (Dressing Goal 1, OT) lower body dressing  -SA     [place]/Cues Needed (Dressing Goal 1, OT) modified independence  -SA     Time Frame (Dressing Goal 1, OT) long term goal (LTG);by discharge  -SA     Progress/Outcome (Dressing Goal 1, OT) goal not met  -[facility] Name 05/17/23 1321          Toileting Goal 1 (OT)    Activity/Device (Toileting Goal 1, OT) toileting skills, all  -SA     [place] Level/Cues Needed (Toileting Goal 1, OT) modified independence  -SA     Time Frame (Toileting Goal 1, OT) long term goal (LTG);by discharge  -     Progress/Outcome (Toileting Goal 1, OT) goal not met  -SA     Row Name 05/17/23 1321          Therapy Assessment/Plan (OT)    Planned Therapy Interventions (OT) activity tolerance training;manual therapy/joint mobilization;patient/caregiver education/training;adaptive equipment training;neuromuscular control/coordination retraining;BADL retraining;cognitive/visual perception retraining;occupation/activity based interventions;ROM/therapeutic exercise;strengthening exercise;edema control/reduction;functional balance retraining;IADL retraining;passive ROM/stretching;transfer/mobility retraining;wheelchair assessment/training;prosthetic fitting/training  [facility]           User Key  (r) = Recorded By, (t) = Taken By, (c) = Cosigned By    Initials Name Provider Type    Deanna Davis OT Occupational Therapist               Clinical Impression     Row Name 05/17/23 1322          Pain Assessment    Pretreatment Pain Rating 8/10  -SA     Posttreatment Pain Rating 8/10  -SA     Pain Location lower  -     Pain Location - back  -     Pain Intervention(s) Repositioned;Rest;Ambulation/increased  "activity  -     Row Name 05/17/23 1322          Plan of Care Review    Plan of Care Reviewed With patient  -     Outcome Evaluation OT eval completed as co-eval with PT. Per PT note \"Spoke with wound care patient is WBAT thru R heel, she plans to put orders in.\". Pt seated in w/c on entry, wound vac on R toe. Pt used R heel to pull w/c to opposite side of bed for chair>bed t/f. Pt required assistance with line management. Pt dependent to don<>doff surgical shoe on R foot. min A required to sit<>stand and pivot from w/c to bed. SBA to don L residual limb compression stocking. SBA sit>sup. Pt would benefit from continued skilled IP/OT to address decreased strength, safety, and independence with ADLs, IADLs, and transfers. Recommended home with assist and outpatient therapy PRN.  -     Row Name 05/17/23 1322          Therapy Assessment/Plan (OT)    Patient/Family Therapy Goal Statement (OT) return home  -     Rehab Potential (OT) good, to achieve stated therapy goals  -     Criteria for Skilled Therapeutic Interventions Met (OT) yes;meets criteria;skilled treatment is necessary  [facility]     Therapy Frequency (OT) other (see comments)  5-7 d/wk  -     Predicted Duration of Therapy Intervention (OT) until all goals met or d/c from hospital  -     Row Name 05/17/23 1322          Therapy Plan Review/Discharge Plan (OT)    Anticipated Discharge Disposition (OT) home with assist;home with outpatient therapy services  -     Row Name 05/17/23 1322          Vital Signs    Pre Systolic BP Rehab 126  -SA     Pre Treatment Diastolic BP 63  -SA     Pretreatment Heart Rate (beats/min) 78  -SA     Pre SpO2 (%) 98  -SA     O2 Delivery Pre Treatment room air  -SA     Pre Patient Position Sitting  -SA     Intra Patient Position Sitting  -SA     Post Patient Position Supine  -     Row Name 05/17/23 1322          Positioning and Restraints    Pre-Treatment Position sitting in chair/recliner  -SA     Post Treatment Position " bed  -SA     In Bed supine;call light within reach;encouraged to call for assist;exit alarm on  -SA           User Key  (r) = Recorded By, (t) = Taken By, (c) = Cosigned By    Initials Name Provider Type    Deanna Davis OT Occupational Therapist               Outcome Measures     Row Name 05/17/23 1321          How much help from another is currently needed...    Putting on and taking off regular lower body clothing? 1  -SA     Bathing (including washing, rinsing, and drying) 2  -SA     Toileting (which includes using toilet bed pan or urinal) 2  -SA     Putting on and taking off regular upper body clothing 3  -SA     Taking care of personal grooming (such as brushing teeth) 4  -SA     Eating meals 4  -SA     AM-PAC 6 Clicks Score (OT) 16  -SA     Row Name 05/17/23 1325 05/17/23 0807       How much help from another person do you currently need...    Turning from your back to your side while in flat bed without using bedrails? 2  -LR 3  -CW    Moving from lying on back to sitting on the side of a flat bed without bedrails? 2  -LR 3  -CW    Moving to and from a bed to a chair (including a wheelchair)? 3  -LR 3  -CW    Standing up from a chair using your arms (e.g., wheelchair, bedside chair)? 3  -LR 3  -CW    Climbing 3-5 steps with a railing? 1  -LR 1  -CW    To walk in hospital room? 1  -LR 1  -CW    AM-PAC 6 Clicks Score (PT) 12  -LR 14  -CW    Highest level of mobility 4 --> Transferred to chair/commode  -LR 4 --> Transferred to chair/commode  -CW    Row Name 05/17/23 1325 05/17/23 1321       Functional Assessment    Outcome Measure Options AM-PAC 6 Clicks Basic Mobility (PT)  -LR AM-PAC 6 Clicks Daily Activity (OT)  -SA          User Key  (r) = Recorded By, (t) = Taken By, (c) = Cosigned By    Initials Name Provider Type    Maira Castro RN Registered Nurse    Lucien Francisco Physical Therapist    Deanna Davis OT Occupational Therapist                Occupational Therapy Education     Title:  PT OT SLP Therapies (In Progress)     Topic: Occupational Therapy (In Progress)     Point: ADL training (Done)     Description:   Instruct learner(s) on proper safety adaptation and remediation techniques during self care or transfers.   Instruct in proper use of assistive devices.              Learning Progress Summary           Patient Acceptance, E,TB, VU by  at 5/17/2023 1413    Comment: OT POC, Role of OT, transfer training                   Point: Home exercise program (Not Started)     Description:   Instruct learner(s) on appropriate technique for monitoring, assisting and/or progressing therapeutic exercises/activities.              Learner Progress:  Not documented in this visit.          Point: Precautions (Done)     Description:   Instruct learner(s) on prescribed precautions during self-care and functional transfers.              Learning Progress Summary           Patient Acceptance, E,TB, VU by  at 5/17/2023 1413    Comment: OT POC, Role of OT, transfer training                   Point: Body mechanics (Done)     Description:   Instruct learner(s) on proper positioning and spine alignment during self-care, functional mobility activities and/or exercises.              Learning Progress Summary           Patient Acceptance, E,TB, VU by  at 5/17/2023 1413    Comment: OT POC, Role of OT, transfer training                               User Key     Initials Effective Dates Name Provider Type Discipline     08/11/22 -  Deanna Quiles OT Occupational Therapist OT              OT Recommendation and Plan  Planned Therapy Interventions (OT): activity tolerance training, manual therapy/joint mobilization, patient/caregiver education/training, adaptive equipment training, neuromuscular control/coordination retraining, BADL retraining, cognitive/visual perception retraining, occupation/activity based interventions, ROM/therapeutic exercise, strengthening exercise, edema control/reduction, functional  "balance retraining, IADL retraining, passive ROM/stretching, transfer/mobility retraining, wheelchair assessment/training, prosthetic fitting/training  Therapy Frequency (OT): other (see comments) (5-7 d/wk)  Plan of Care Review  Plan of Care Reviewed With: patient  Outcome Evaluation: OT eval completed as co-eval with PT. Per PT note \"Spoke with wound care patient is WBAT thru R heel, she plans to put orders in.\". Pt seated in w/c on entry, wound vac on R toe. Pt used R heel to pull w/c to opposite side of bed for chair>bed t/f. Pt required assistance with line management. Pt dependent to don<>doff surgical shoe on R foot. min A required to sit<>stand and pivot from w/c to bed. SBA to don L residual limb compression stocking. SBA sit>sup. Pt would benefit from continued skilled IP/OT to address decreased strength, safety, and independence with ADLs, IADLs, and transfers. Recommended home with assist and outpatient therapy PRN.     Time Calculation:    Time Calculation- OT     Row Name 05/17/23 1321             Time Calculation- OT    OT Start Time 1321  -SA      OT Stop Time 1415  -SA      OT Time Calculation (min) 54 min  -SA      OT Received On 05/17/23  -SA      OT Goal Re-Cert Due Date 05/30/23  -SA         Untimed Charges    OT Eval/Re-eval Minutes 54  -SA         Total Minutes    Untimed Charges Total Minutes 54  -SA       Total Minutes 54  -SA            User Key  (r) = Recorded By, (t) = Taken By, (c) = Cosigned By    Initials Name Provider Type     Deanna Quiles OT Occupational Therapist              Therapy Charges for Today     Code Description Service Date Service Provider Modifiers Qty    99591308763 HC OT EVAL MOD COMPLEXITY 4 5/17/2023 Deanna Quiles OT GO 1               Deanna Quiles OT  5/17/2023  "

## 2023-05-17 NOTE — CONSULTS
Adult Nutrition  Assessment/PES    Patient Name:  Yamileth Slater  YOB: 1960  MRN: [id number]  Admit Date:  5/11/2023    Assessment Date:  5/17/2023    Comments:      Reason for RD visit:  Length of stay  Appetite:  Good appetite  Chewing/swallowing difficulty:  denies  Food allergies:  denies  N/V:  Had yesterday with emesis x4 after dialysis  Change in BMs:  Have gotten softer but was having really hard stool  Change in Wt:  Was 315# 4 months ago per pt.  Pt states she is happy with the wt loss.   CBW:  237#    Pt has noted wound vac to surgical wound on foot.  Also has noted gluteal wound.  Discussed increased nutrient needs r/t.  Pt agreed to try [facility].  Pt has HD TTS.  RD staff will follow hospital course.     Active Hospital Problems:  Active Hospital Problems    Diagnosis  POA   • **ESRD (end stage renal disease) on dialysis [N18.6, Z99.2]  Not Applicable      Resolved Hospital Problems   No resolved problems to display.     Past Medical History:   Diagnosis Date   • Acute blood loss anemia 04/16/2017    Likely due to gastric oozing at this time. - Dr. Duarte (GI) was consulted and has now signed off, will follow up outpatient - pill colonoscopy showed AVMs - continue to monitor   • Acute cystitis with hematuria 03/31/2021 1/13: IV Rocephin 1 gm q 24 1/14 : transitioned  to omnicef 300mg. Urine cultures resulted and did not show growth. Omnicef discontinued as patient is asymptomatic   • Altered mental status 01/09/2022    - AMS on presentation - initial ABG pH 7.3, CO2 34 - Procal 0.29 - UA negative for acute cysitits -CTA head wnl  - Empiric Zosyn and Vancomycin -Lactate 2.5 on admission  - blood cultures no growth at 24 hours     • Anxiety    • CAD (coronary artery disease) 04/24/2021    S/P 3 stents 5/1/2021 for [facility] Continue ASA 81mg & Clopidogrel 75mg Continue Atorvastatin 40mg   • Carotid artery stenosis    • CHF (congestive heart failure)    • Chronic obstructive lung disease     • CKD (chronic kidney disease) stage 4, GFR 15-29 ml/min    • CKD (chronic kidney disease), symptom management only, stage 5 10/05/2020    Results from last 7 days Lab Units 12/15/21 0548 12/14/21 1323 12/14/21 0916 CREATININE mg/dL 3.92* 3.21* 3.32*  Baseline creatinine 2-3 GFR 13-25 GFR 15 Dialysis MWF, sees Dr. Lauren Nephrology consult,, appreciate recommendations Continue Bumex 1mg bid daily Holding Bumex 2mg 4 times a week   • Colonic polyp    • Coronary arteriosclerosis    • Diabetes mellitus    • Diabetic neuropathy    • Ear pain, right 10/18/2021    - canal trauma due to patient scratching and DMT2 - added cortisporin ear drops   • Elevated troponin 10/12/2021    -most likely from CKD -Trending down -Neg chest pain   • Generalized abdominal pain 07/01/2022    Could be due to initiation of tube feeds vs dyspepsia vs abdominal cramps related to no PO intake due to intubation vs constipation Continue current laxative regiment  If no bowel movement by this afternoon will consider enema   • GERD (gastroesophageal reflux disease)    • GI bleed 05/13/2021    - GI will follow up outpatient - Protonix 40mg daily - Avoid medical DVT prophy and use mechanical at this time instead. - Continue to monitor - pill colonoscopy results showed AVMs   • History of transfusion    • Hypercholesterolemia    • Hyperosmolar hyperglycemic state (HHS) 06/25/2022    Serum glucose 605 on admission  Anion gap 16 PH 7.37 Bicarb 27.9 Continue fluids  Insulin drip with HHS protocol  Anion gap closed around 10 AM, received one dose of Levamir subq, will stop insulin drip after 2 hrs     • Hypertension    • Hypokalemia 05/27/2022    Will replace as needed. Will be cautious in the setting of ESRD to avoid need for emergency dialysis   Monitor Qtc intervals on EKG     • Hypomagnesemia 06/27/2021    Monitor and replace   • Morbid obesity    • Nephrolithiasis    • On mechanically assisted ventilation 06/26/2022    Vent management and sedation  orders placed.  - [facility] intensivist group consulted for vent management appreciate recommendations  - plan to extubate today     • Peripheral vascular disease    • Pleural effusion on right 06/26/2022    CXR on 6/30/22 read as a small upper left pulmonary edema vs early pneumonia.  Last Echo 1/2022 EF 61-65 % Continue to monitor  Procal slightly improved, CRP improved On Linezolid and meropenum      • PVD (peripheral vascular disease)    • SIRS (systemic inflammatory response syndrome) 01/09/2022    Admission  - WBC 17.78   -   - RR 16 - 1/10: VSS/wnl - CXR - Mild pulm edema - Blood cultures no growth at 24 hours  - Procalcitonin 0.29 - UA : glucose 1000, negative Leucocytes/nitrate - Empiric Zosyn and Vancomcyin    • Sleep apnea    • Substance abuse    • Vitamin D deficiency      Past Surgical History:   Procedure Laterality Date   • AMPUTATION DIGIT Right 2/27/2023    Procedure: Excision of right first metatarsal head, right second toe amputation;  Surgeon: Jean Oliveira DPM;  Location: [facility];  Service: Podiatry;  Laterality: Right;   • BELOW KNEE AMPUTATION Left 12/16/2022    Procedure: AMPUTATION BELOW KNEE, LEFT;  Surgeon: Huy White MD;  Location: [facility] OR;  Service: Orthopedics;  Laterality: Left;   • CARDIAC CATHETERIZATION N/A 07/14/2020   • CARDIAC CATHETERIZATION N/A 04/23/2021    Procedure: Left Heart Cath;  Surgeon: Melba Romo MD;  Location: [facility] CATH INVASIVE LOCATION;  Service: Cardiology;  Laterality: N/A;   • CARDIAC CATHETERIZATION N/A 04/30/2021    Procedure: Percutaneous Coronary Intervention;  Surgeon: Russell Voss MD;  Location: [facility] CATH INVASIVE LOCATION;  Service: Cardiovascular;  Laterality: N/A;   • CARDIAC CATHETERIZATION N/A 04/30/2021    Procedure: Stent NIKKI coronary;  Surgeon: Russell Voss MD;  Location: [facility] CATH INVASIVE LOCATION;  Service: Cardiovascular;  Laterality: N/A;   • CARDIAC CATHETERIZATION Left  11/13/2021    Procedure: Left Heart Cath;  Surgeon: Niall Rios MD;  Location: [facility] CATH INVASIVE LOCATION;  Service: Cardiology;  Laterality: Left;   • CAROTID STENT Left    • COLONOSCOPY     • COLONOSCOPY N/A 05/14/2021    Procedure: COLONOSCOPY;  Surgeon: Mingo Duarte MD;  Location: [facility] ENDOSCOPY;  Service: Gastroenterology;  Laterality: N/A;   • CORONARY ARTERY BYPASS GRAFT N/A 2013    CABG X 3   • CYSTOSCOPY BLADDER STONE LITHOTRIPSY Bilateral    • ENDOSCOPY N/A 04/12/2021    Procedure: ESOPHAGOGASTRODUODENOSCOPY;  Surgeon: Mingo Duarte MD;  Location: [facility] ENDOSCOPY;  Service: Gastroenterology;  Laterality: N/A;   • ENDOSCOPY N/A 05/14/2021    Procedure: ESOPHAGOGASTRODUODENOSCOPY;  Surgeon: Mingo Duarte MD;  Location: [facility] ENDOSCOPY;  Service: Gastroenterology;  Laterality: N/A;   • ENDOSCOPY N/A 01/28/2022    Procedure: ESOPHAGOGASTRODUODENOSCOPY;  Surgeon: Mingo Duarte MD;  Location: [facility] ENDOSCOPY;  Service: Gastroenterology;  Laterality: N/A;   • HYSTERECTOMY     • INTERVENTIONAL RADIOLOGY PROCEDURE N/A 10/21/2021    Procedure: tunneled central venous catheter placement;  Surgeon: Donnie Robles MD;  Location: [facility] ANGIO INVASIVE LOCATION;  Service: Interventional Radiology;  Laterality: N/A;   • INTERVENTIONAL RADIOLOGY PROCEDURE N/A 01/27/2022    Procedure: tunneled central venous catheter placement;  Surgeon: Donnie Robles MD;  Location: [facility] ANGIO INVASIVE LOCATION;  Service: Interventional Radiology;  Laterality: N/A;   • LAPAROSCOPIC TUBAL LIGATION     • TUNNELED VENOUS CATHETER PLACEMENT       Allergies:  Adhesive tape, Nsaids, Latex, Other, and Wound dressing adhesive     Reason for Assessment     Row Name 05/17/23 1245          Reason for Assessment    Reason For Assessment per organizational policy     Identified At Risk by Screening Criteria other (see comments)  LOS                Nutrition/Diet History     Row Name 05/17/23 9076           Nutrition/Diet History    Typical Intake (Food/Fluid/EN/PN) good appetite                Labs/Tests/Procedures/Meds     Row Name 05/17/23 1247          Labs/Procedures/Meds    Lab Results Reviewed reviewed, pertinent     Lab Results Comments Na 133, Cl 91, Glu 347, BUN 36, Cr 4.14, Procalcitionin 0.27        Diagnostic Tests/Procedures    Diagnostic Test/Procedure Reviewed reviewed, pertinent        Medications    Pertinent Medications Reviewed reviewed, pertinent     Pertinent Medications Comments lipitor, colace, ssi, novolog, levemir, protonix                  Estimated/Assessed Needs - Anthropometrics     Row Name 05/17/23 1250          Anthropometrics    Weight for Calculation 50 kg (110 lb 3.7 oz)  IBW        Estimated/Assessed Needs    Additional Documentation KCAL/KG (Group);Protein Requirements (Group);Fluid Requirements (Group)        KCAL/KG    KCAL/KG 30 Kcal/Kg (kcal)     30 Kcal/Kg (kcal) 1500        Protein Requirements    Weight Used For Protein Calculations 50 kg (110 lb 3.7 oz)     Est Protein Requirement Amount (gms/kg) 1.2 gm protein     Estimated Protein Requirements (gms/day) 60        Fluid Requirements    Fluid Requirements (mL/day) 1500     RDA Method (mL) 1500                Nutrition Prescription Ordered     Row Name 05/17/23 1253          Nutrition Prescription PO    Current PO Diet Regular     Common Modifiers Cardiac;Consistent Carbohydrate;Renal                Evaluation of Received Nutrient/Fluid Intake     Row Name 05/17/23 1254          PO Evaluation    Number of Meals 14     % PO Intake 61                   Problem/Interventions:   Problem 1     Row Name 05/17/23 1255          Nutrition Diagnoses Problem 1    Problem 1 Increased Nutrient Needs     Macronutrient Kcal;Protein     Micronutrient Vitamin;Mineral     Etiology (related to) Medical Diagnosis     Skin Surgical wound     Signs/Symptoms (evidenced by) Other (comment)  surgical wound with wound vac                       Intervention Goal     Row Name 05/17/23 1300          Intervention Goal    General Maintain nutrition;Meet nutritional needs for age/condition     PO Meet estimated needs     Weight Appropriate weight loss                Nutrition Intervention     Row Name 05/17/23 1300          Nutrition Intervention    RD/Tech Action Follow Tx progress;Care plan reviewd;Recommend/ordered     Recommended/Ordered Supplement                Nutrition Prescription     Row Name 05/17/23 1300          Nutrition Prescription PO    PO Prescription Begin/change supplement     Supplement Leo     Supplement Frequency 2 times a day                Education/Evaluation     Row Name 05/17/23 1300          Education    Education Education topics;Advised regarding habits/behavior     Education Topics Basic nutrition     Advised Regarding Habits/Behavior Use supplement        Monitor/Evaluation    Monitor Per protocol;PO intake;Supplement intake;Pertinent labs;Weight;Skin status;Symptoms     Education Follow-up Reinforce PRN                 Electronically signed by:  Soledad Christianson RD  05/17/23 13:01 CDT

## 2023-05-17 NOTE — THERAPY EVALUATION
Patient Name: Yamileth Slater  : 1960    MRN: [id number]                              Today's Date: 2023       Admit Date: 2023    Visit Dx:     ICD-10-CM ICD-9-CM   1. ESRD (end stage renal disease) on dialysis  N18.6 585.6    Z99.2 V45.11   2. Hyperkalemia  E87.5 276.7   3. Urinary tract infection in female  N39.0 599.0   4. Impaired functional mobility, balance, gait, and endurance  Z74.09 V49.89   5. Impaired mobility and ADLs  Z74.09 V49.89    Z78.9      Patient Active Problem List   Diagnosis   • Chronic midline low back pain without sciatica   • Vitamin D deficiency   • Tobacco dependence syndrome   • Shoulder joint pain   • Morbid obesity with BMI of 50.0-59.9, adult   • Mixed hyperlipidemia   • Kidney stone   • History of colon polyps   • GERD (gastroesophageal reflux disease)   • Excoriated eczema   • Hypertension   • COPD (chronic obstructive pulmonary disease)   • Chronic folliculitis   • Coronary artery disease involving native coronary artery of native heart without angina pectoris   • 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   • Hypothyroidism   • Carotid artery disease   • [place] abuse   • PAD (peripheral artery disease) ([facility])   • Venous insufficiency   • LAFB (left anterior fascicular block)   • Pulmonary hypertension (HCC)   • Left hip pain   • Neck pain   • MIKE and COPD overlap syndrome   • Pneumonia due to COVID-19 virus   • Hyperkalemia   • Cutaneous candidiasis   • Community acquired pneumonia of right middle lobe of lung   • MRSA infection   • Esophageal dysphagia   • Monilial esophagitis   • NSTEMI, initial episode of care   • CAD (coronary artery disease)   • Cellulitis of foot associated with diabetes mellitus   • Urinary tract infection due to Proteus   • History of insertion of tunneled central venous catheter (CVC) with port   • Anemia due to chronic kidney disease, on chronic dialysis   • Pneumonitis   • Personal history of noncompliance with  medical treatment, presenting hazards to health   • Type 2 diabetes mellitus with circulatory disorder   • Physical deconditioning   • ESRD on hemodialysis   • DVT prophylaxis   • Anemia   • Ventilator associated pneumonia   • Positive fecal occult blood test   • Yeast UTI   • Gangrene of both feet   • Left foot pain   • 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   • On home oxygen therapy   • Subacute osteomyelitis of right foot   • Acute respiratory failure with hypoxia   • ESRD (end stage renal disease) on dialysis     Past Medical History:   Diagnosis Date   • Acute blood loss anemia 04/16/2017    Likely due to gastric oozing at this time. - Dr. Duarte (GI) was consulted and has now signed off, will follow up outpatient - pill colonoscopy showed AVMs - continue to monitor   • Acute cystitis with hematuria 03/31/2021 1/13: IV Rocephin 1 gm q 24 1/14 : transitioned  to omnicef 300mg. Urine cultures resulted and did not show growth. Omnicef discontinued as patient is asymptomatic   • Altered mental status 01/09/2022    - AMS on presentation - initial ABG pH 7.3, CO2 34 - Procal 0.29 - UA negative for acute cysitits -CTA head wnl  - Empiric Zosyn and Vancomycin -Lactate 2.5 on admission  - blood cultures no growth at 24 hours     • Anxiety    • CAD (coronary artery disease) 04/24/2021    S/P 3 stents 5/1/2021 for BHL Continue ASA 81mg & Clopidogrel 75mg Continue Atorvastatin 40mg   • Carotid artery stenosis    • CHF (congestive heart failure)    • Chronic obstructive lung disease    • CKD (chronic kidney disease) stage 4, GFR 15-29 ml/min    • CKD (chronic kidney disease), symptom management only, stage 5 10/05/2020    Results from last 7 days Lab Units 12/15/21 0548 12/14/21 1323 12/14/21 0916 CREATININE mg/dL 3.92* 3.21* 3.32*  Baseline creatinine 2-3 GFR 13-25 GFR 15 Dialysis MWF, sees Dr. Lauren Nephrology consult,, appreciate recommendations Continue Bumex 1mg bid daily Holding Bumex 2mg 4 times a week    • Colonic polyp    • Coronary arteriosclerosis    • Diabetes mellitus    • Diabetic neuropathy    • Ear pain, right 10/18/2021    - canal trauma due to patient scratching and DMT2 - added cortisporin ear drops   • Elevated troponin 10/12/2021    -most likely from CKD -Trending down -Neg chest pain   • Generalized abdominal pain 07/01/2022    Could be due to initiation of tube feeds vs dyspepsia vs abdominal cramps related to no PO intake due to intubation vs constipation Continue current laxative regiment  If no bowel movement by this afternoon will consider enema   • GERD (gastroesophageal reflux disease)    • GI bleed 05/13/2021    - GI will follow up outpatient - Protonix 40mg daily - Avoid medical DVT prophy and use mechanical at this time instead. - Continue to monitor - pill colonoscopy results showed AVMs   • History of transfusion    • Hypercholesterolemia    • Hyperosmolar hyperglycemic state (HHS) 06/25/2022    Serum glucose 605 on admission  Anion gap 16 PH 7.37 Bicarb 27.9 Continue fluids  Insulin drip with HHS protocol  Anion gap closed around 10 AM, received one dose of Levamir subq, will stop insulin drip after 2 hrs     • Hypertension    • Hypokalemia 05/27/2022    Will replace as needed. Will be cautious in the setting of ESRD to avoid need for emergency dialysis   Monitor Qtc intervals on EKG     • Hypomagnesemia 06/27/2021    Monitor and replace   • Morbid obesity    • Nephrolithiasis    • On mechanically assisted ventilation 06/26/2022    Vent management and sedation orders placed.  - [facility] intensivist group consulted for vent management appreciate recommendations  - plan to extubate today     • Peripheral vascular disease    • Pleural effusion on right 06/26/2022    CXR on 6/30/22 read as a small upper left pulmonary edema vs early pneumonia.  Last Echo 1/2022 EF 61-65 % Continue to monitor  Procal slightly improved, CRP improved On Linezolid and meropenum      • PVD (peripheral  vascular disease)    • SIRS (systemic inflammatory response syndrome) 01/09/2022    Admission  - WBC 17.78   -   - RR 16 - 1/10: VSS/wnl - CXR - Mild pulm edema - Blood cultures no growth at 24 hours  - Procalcitonin 0.29 - UA : glucose 1000, negative Leucocytes/nitrate - Empiric Zosyn and Vancomcyin    • Sleep apnea    • Substance abuse    • Vitamin D deficiency      Past Surgical History:   Procedure Laterality Date   • AMPUTATION DIGIT Right 2/27/2023    Procedure: Excision of right first metatarsal head, right second toe amputation;  Surgeon: Jean Oliveira DPM;  Location: [facility] OR;  Service: Podiatry;  Laterality: Right;   • BELOW KNEE AMPUTATION Left 12/16/2022    Procedure: AMPUTATION BELOW KNEE, LEFT;  Surgeon: Huy White MD;  Location: [facility] OR;  Service: Orthopedics;  Laterality: Left;   • CARDIAC CATHETERIZATION N/A 07/14/2020   • CARDIAC CATHETERIZATION N/A 04/23/2021    Procedure: Left Heart Cath;  Surgeon: Melba Romo MD;  Location: [facility] CATH INVASIVE LOCATION;  Service: Cardiology;  Laterality: N/A;   • CARDIAC CATHETERIZATION N/A 04/30/2021    Procedure: Percutaneous Coronary Intervention;  Surgeon: Russell Voss MD;  Location: [facility] CATH INVASIVE LOCATION;  Service: Cardiovascular;  Laterality: N/A;   • CARDIAC CATHETERIZATION N/A 04/30/2021    Procedure: Stent NIKKI coronary;  Surgeon: Russell Voss MD;  Location: [facility] CATH INVASIVE LOCATION;  Service: Cardiovascular;  Laterality: N/A;   • CARDIAC CATHETERIZATION Left 11/13/2021    Procedure: Left Heart Cath;  Surgeon: Niall Rios MD;  Location: [facility] CATH INVASIVE LOCATION;  Service: Cardiology;  Laterality: Left;   • CAROTID STENT Left    • COLONOSCOPY     • COLONOSCOPY N/A 05/14/2021    Procedure: COLONOSCOPY;  Surgeon: Mingo Duarte MD;  Location: [facility] ENDOSCOPY;  Service: Gastroenterology;  Laterality: N/A;   • CORONARY ARTERY BYPASS GRAFT N/A 2013    CABG X 3   • CYSTOSCOPY  BLADDER STONE LITHOTRIPSY Bilateral    • ENDOSCOPY N/A 04/12/2021    Procedure: ESOPHAGOGASTRODUODENOSCOPY;  Surgeon: Mingo Duarte MD;  Location: [facility] ENDOSCOPY;  Service: Gastroenterology;  Laterality: N/A;   • ENDOSCOPY N/A 05/14/2021    Procedure: ESOPHAGOGASTRODUODENOSCOPY;  Surgeon: Mingo Duarte MD;  Location: [facility] ENDOSCOPY;  Service: Gastroenterology;  Laterality: N/A;   • ENDOSCOPY N/A 01/28/2022    Procedure: ESOPHAGOGASTRODUODENOSCOPY;  Surgeon: Mingo Duarte MD;  Location: [facility] ENDOSCOPY;  Service: Gastroenterology;  Laterality: N/A;   • HYSTERECTOMY     • INTERVENTIONAL RADIOLOGY PROCEDURE N/A 10/21/2021    Procedure: tunneled central venous catheter placement;  Surgeon: Donnie Robles MD;  Location: [facility] ANGIO INVASIVE LOCATION;  Service: Interventional Radiology;  Laterality: N/A;   • INTERVENTIONAL RADIOLOGY PROCEDURE N/A 01/27/2022    Procedure: tunneled central venous catheter placement;  Surgeon: Donnie Robles MD;  Location: [facility] ANGIO INVASIVE LOCATION;  Service: Interventional Radiology;  Laterality: N/A;   • LAPAROSCOPIC TUBAL LIGATION     • TUNNELED VENOUS CATHETER PLACEMENT        General Information     Row Name 05/17/23 1325          Physical Therapy Time and Intention    Document Type evaluation  -LR     Mode of Treatment physical therapy;occupational therapy  -LR     Row Name 05/17/23 1325          General Information    Patient Profile Reviewed yes  -LR     Prior Level of Function independent:;transfer;bed mobility;ADL's  -LR     Existing Precautions/Restrictions fall  -LR     Barriers to Rehab medically complex;previous functional deficit  -LR     Row Name 05/17/23 1325          Living Environment    People in Home sibling(s)  Sister  -LR     Row Name 05/17/23 1325          Home Main Entrance    Number of Stairs, Main Entrance none  -LR     Row Name 05/17/23 1325          Stairs Within Home, Primary    Stairs, Within Home, Primary Has, manual  w/c, L prosthesis she just got on 5.11.23, RW, rollator, BSC, slideboard, Tub/shower with Shower chair, fell out of chair when asleep  -LR     Number of Stairs, Within Home, Primary none  -LR     Row Name 05/17/23 1325          Cognition    Orientation Status (Cognition) oriented x 4  -LR     Row Name 05/17/23 1325          Safety Issues, Functional Mobility    Safety Issues Affecting Function (Mobility) safety precautions follow-through/compliance;insight into deficits/self-awareness;safety precaution awareness  -LR     Impairments Affecting Function (Mobility) balance;endurance/activity tolerance;strength  -LR           User Key  (r) = Recorded By, (t) = Taken By, (c) = Cosigned By    Initials Name Provider Type    Lucien Francisco Physical Therapist               Mobility     Row Name 05/17/23 1325          Bed Mobility    Bed Mobility supine-sit  -LR     Sit-Supine [place] (Bed Mobility) standby assist  -LR     Row Name 05/17/23 1325          Bed-Chair Transfer    Bed-Chair [place] (Transfers) minimum assist (75% patient effort)  -LR     Row Name 05/17/23 1325          Mobility    Extremity Weight-bearing Status right lower extremity  -LR     Right Lower Extremity (Weight-bearing Status) partial weight-bearing (PWB)  full WB thru heel  -LR           User Key  (r) = Recorded By, (t) = Taken By, (c) = Cosigned By    Initials Name Provider Type    Lucien Francisco Physical Therapist               Obj/Interventions     Row Name 05/17/23 1325          Range of Motion Comprehensive    General Range of Motion no range of motion deficits identified  -LR     Comment, General Range of Motion RLE grossly WFL  -LR     Row Name 05/17/23 1325          Strength Comprehensive (MMT)    Comment, General Manual Muscle Testing (MMT) Assessment RLE grossly 4/5,  -LR           User Key  (r) = Recorded By, (t) = Taken By, (c) = Cosigned By    Initials Name Provider Type    Lucien Francisco Physical Therapist                Goals/Plan     Row Name 05/17/23 1321          Bed Mobility Goal 1 (PT)    Activity/Assistive Device (Bed Mobility Goal 1, PT) sit to supine;supine to sit  -LR     Will Level/Cues Needed (Bed Mobility Goal 1, PT) modified independence  -LR     Time Frame (Bed Mobility Goal 1, PT) by discharge  -LR     Progress/Outcomes (Bed Mobility Goal 1, PT) goal not met  -LR     Row Name 05/17/23 1321          Transfer Goal 1 (PT)    Activity/Assistive Device (Transfer Goal 1, PT) sit-to-stand/stand-to-sit;bed-to-chair/chair-to-bed  -LR     Will Level/Cues Needed (Transfer Goal 1, PT) modified independence  -LR     Time Frame (Transfer Goal 1, PT) by discharge  -LR     Progress/Outcome (Transfer Goal 1, PT) goal not met  -LR     Row Name 05/17/23 1321          Gait Training Goal 1 (PT)    Activity/Assistive Device (Gait Training Goal 1, PT) gait (walking locomotion);assistive device use  -LR     Will Level (Gait Training Goal 1, PT) moderate assist (50-74% patient effort)  -LR     Distance (Gait Training Goal 1, PT) 5'x1  -LR     Time Frame (Gait Training Goal 1, PT) by discharge  -LR     Progress/Outcome (Gait Training Goal 1, PT) goal not met  -LR     Row Name 05/17/23 1321          Therapy Assessment/Plan (PT)    Planned Therapy Interventions (PT) balance training;bed mobility training;gait training;home exercise program;joint mobilization;lumbar stabilization;manual therapy techniques;neuromuscular re-education;orthotic fitting/training;patient/family education;postural re-education;prosthetic fitting/training;strengthening;stretching;transfer training;wheelchair management/propulsion training;ROM (range of motion);stair training  -LR           User Key  (r) = Recorded By, (t) = Taken By, (c) = Cosigned By    Initials Name Provider Type    LR Lucien Gilman Physical Therapist               Clinical Impression     Row Name 05/17/23 1321          Pain    Pretreatment Pain Rating 0/10 - no pain   -LR     Posttreatment Pain Rating 0/10 - no pain  -LR     Row Name 05/17/23 1321          Plan of Care Review    Plan of Care Reviewed With patient  -LR     Outcome Evaluation PT eval completed. Spoke with wound care patient is WBAT thru R heel, she plans to put orders in. Patient reports her sister lives with her and she lives in the senior apartments which are wheelchair acessible. MinAx1 for w/c>bed stand pivot with front of w/c facing the bed. SBA for sit>supine transfer with HOB up and bed rails. Patient reports she cannot breath if laying flat. Strongly encouraged patient to get L prosthesis into hospital to improve transfer and decrease fall risk. Anticipate home with assist and resume therapy for the L prosthesis.  -LR     Row Name 05/17/23 1321          Therapy Assessment/Plan (PT)    Patient/Family Therapy Goals Statement (PT) Patient wants to return home.  -LR     Rehab Potential (PT) good, to achieve stated therapy goals  -LR     Criteria for Skilled Interventions Met (PT) yes;meets criteria;skilled treatment is necessary  -LR     Therapy Frequency (PT) 5 times/wk  -LR     Predicted Duration of Therapy Intervention (PT) Until d/c or until all goals met  -LR     Row Name 05/17/23 1321          Vital Signs    Pre Systolic BP Rehab 126  -LR     Pre Treatment Diastolic BP 63  -LR     Pretreatment Heart Rate (beats/min) 78  -LR     Pre SpO2 (%) 98  -LR     O2 Delivery Pre Treatment room air  -LR     Pre Patient Position Sitting  -LR     Row Name 05/17/23 1321          Positioning and Restraints    Pre-Treatment Position sitting in chair/recliner  -LR     Post Treatment Position bed  -LR     In Bed notified nsg;call light within reach;encouraged to call for assist;exit alarm on  -LR           User Key  (r) = Recorded By, (t) = Taken By, (c) = Cosigned By    Initials Name Provider Type    LR Lucien Gilman Physical Therapist               Outcome Measures     Row Name 05/17/23 1325 05/17/23 0807       How much  help from another person do you currently need...    Turning from your back to your side while in flat bed without using bedrails? 2  -LR 3  -CW    Moving from lying on back to sitting on the side of a flat bed without bedrails? 2  -LR 3  -CW    Moving to and from a bed to a chair (including a wheelchair)? 3  -LR 3  -CW    Standing up from a chair using your arms (e.g., wheelchair, bedside chair)? 3  -LR 3  -CW    Climbing 3-5 steps with a railing? 1  -LR 1  -CW    To walk in hospital room? 1  -LR 1  -CW    AM-PAC 6 Clicks Score (PT) 12  -LR 14  -CW    Highest level of mobility 4 --> Transferred to chair/commode  -LR 4 --> Transferred to chair/commode  -CW    Row Name 05/17/23 1325 05/17/23 1321       Functional Assessment    Outcome Measure Options AM-PAC 6 Clicks Basic Mobility (PT)  -LR AM-PAC 6 Clicks Daily Activity (OT)  -          User Key  (r) = Recorded By, (t) = Taken By, (c) = Cosigned By    Initials Name Provider Type    CW Maira Chapman, RN Registered Nurse    Lucien Francisco Physical Therapist    Deanna Davis OT Occupational Therapist                             Physical Therapy Education     Title: PT OT SLP Therapies (In Progress)     Topic: Physical Therapy (Done)     Point: Mobility training (Done)     Learning Progress Summary           Patient Acceptance, E,TB, VU by LR at 5/17/2023 1414    Comment: Educated on PT POC and goals.                   Point: Home exercise program (Done)     Learning Progress Summary           Patient Acceptance, E,TB, VU by LR at 5/17/2023 1414    Comment: Educated on PT POC and goals.                   Point: Body mechanics (Done)     Learning Progress Summary           Patient Acceptance, E,TB, VU by LR at 5/17/2023 1414    Comment: Educated on PT POC and goals.                   Point: Precautions (Done)     Learning Progress Summary           Patient Acceptance, E,TB, VU by LR at 5/17/2023 1414    Comment: Educated on PT POC and goals.                                User Key     Initials Effective Dates Name Provider Type Discipline    LR 06/16/21 -  Lucien Gilman Physical Therapist PT              PT Recommendation and Plan  Planned Therapy Interventions (PT): balance training, bed mobility training, gait training, home exercise program, joint mobilization, lumbar stabilization, manual therapy techniques, neuromuscular re-education, orthotic fitting/training, patient/family education, postural re-education, prosthetic fitting/training, strengthening, stretching, transfer training, wheelchair management/propulsion training, ROM (range of motion), stair training  Plan of Care Reviewed With: patient  Outcome Evaluation: PT eval completed. Spoke with wound care patient is WBAT thru R heel, she plans to put orders in. Patient reports her sister lives with her and she lives in the senior apartments which are wheelchair acessible. MinAx1 for w/c>bed stand pivot with front of w/c facing the bed. SBA for sit>supine transfer with HOB up and bed rails. Patient reports she cannot breath if laying flat. Strongly encouraged patient to get L prosthesis into hospital to improve transfer and decrease fall risk. Anticipate home with assist and resume therapy for the L prosthesis.     Time Calculation:    PT Charges     Row Name 05/17/23 1414             Time Calculation    Start Time 1321  -LR      Stop Time 1415  -LR      Time Calculation (min) 54 min  -LR      PT Received On 05/17/23  -LR      PT Goal Re-Cert Due Date 05/30/23  -LR         Untimed Charges    PT Eval/Re-eval Minutes 54  -LR         Total Minutes    Untimed Charges Total Minutes 54  -LR       Total Minutes 54  -LR            User Key  (r) = Recorded By, (t) = Taken By, (c) = Cosigned By    Initials Name Provider Type    LR Lucien Gilman Physical Therapist              Therapy Charges for Today     Code Description Service Date Service Provider Modifiers Qty    [id number] HC PT EVAL MOD COMPLEXITY 4 5/17/2023  Lucien Gilman GP 1          PT G-Codes  Outcome Measure Options: AM-PAC 6 Clicks Basic Mobility (PT)  AM-PAC 6 Clicks Score (PT): 12  AM-PAC 6 Clicks Score (OT): 16  PT Discharge Summary  Anticipated Discharge Disposition (PT): home with assist    Lucien Gilman  5/17/2023

## 2023-05-17 NOTE — PROGRESS NOTES
[facility]  INPATIENT WOUND & OSTOMY CARE    PROGRESS NOTE    Today's Date: 05/17/23    Patient Name: Yamileth Slater  MRN: [id number]  CSN: [id number]  PCP: Kiersten Wilson APRN  Referring Provider: Jef Ramires MD  Attending Provider: Jef Ramires MD  Length of Stay: 4    SUBJECTIVE   Chief Complaint: left foot wound    HPI: Follow up today. Due for woundvac change. Patient sitting in wheelchair.      Visit Dx:    ICD-10-CM ICD-9-CM   1. ESRD (end stage renal disease) on dialysis  N18.6 585.6    Z99.2 V45.11   2. Hyperkalemia  E87.5 276.7   3. Urinary tract infection in female  N39.0 599.0       Hospital Problem List:     ESRD (end stage renal disease) on dialysis      History:   Past Medical History:   Diagnosis Date   • Acute blood loss anemia 04/16/2017    Likely due to gastric oozing at this time. - Dr. Duarte (GI) was consulted and has now signed off, will follow up outpatient - pill colonoscopy showed AVMs - continue to monitor   • Acute cystitis with hematuria 03/31/2021 1/13: IV Rocephin 1 gm q 24 1/14 : transitioned  to omnicef 300mg. Urine cultures resulted and did not show growth. Omnicef discontinued as patient is asymptomatic   • Altered mental status 01/09/2022    - AMS on presentation - initial ABG pH 7.3, CO2 34 - Procal 0.29 - UA negative for acute cysitits -CTA head wnl  - Empiric Zosyn and Vancomycin -Lactate 2.5 on admission  - blood cultures no growth at 24 hours     • Anxiety    • CAD (coronary artery disease) 04/24/2021    S/P 3 stents 5/1/2021 for [facility] Continue ASA 81mg & Clopidogrel 75mg Continue Atorvastatin 40mg   • Carotid artery stenosis    • CHF (congestive heart failure)    • Chronic obstructive lung disease    • CKD (chronic kidney disease) stage 4, GFR 15-29 ml/min    • CKD (chronic kidney disease), symptom management only, stage 5 10/05/2020    Results from last 7 days Lab Units 12/15/21 0548 12/14/21 1323 12/14/21 0916  CREATININE mg/dL 3.92* 3.21* 3.32*  Baseline creatinine 2-3 GFR 13-25 GFR 15 Dialysis MWF, sees Dr. Lauren Nephrology consult,, appreciate recommendations Continue Bumex 1mg bid daily Holding Bumex 2mg 4 times a week   • Colonic polyp    • Coronary arteriosclerosis    • Diabetes mellitus    • Diabetic neuropathy    • Ear pain, right 10/18/2021    - canal trauma due to patient scratching and DMT2 - added cortisporin ear drops   • Elevated troponin 10/12/2021    -most likely from CKD -Trending down -Neg chest pain   • Generalized abdominal pain 07/01/2022    Could be due to initiation of tube feeds vs dyspepsia vs abdominal cramps related to no PO intake due to intubation vs constipation Continue current laxative regiment  If no bowel movement by this afternoon will consider enema   • GERD (gastroesophageal reflux disease)    • GI bleed 05/13/2021    - GI will follow up outpatient - Protonix 40mg daily - Avoid medical DVT prophy and use mechanical at this time instead. - Continue to monitor - pill colonoscopy results showed AVMs   • History of transfusion    • Hypercholesterolemia    • Hyperosmolar hyperglycemic state (HHS) 06/25/2022    Serum glucose 605 on admission  Anion gap 16 PH 7.37 Bicarb 27.9 Continue fluids  Insulin drip with HHS protocol  Anion gap closed around 10 AM, received one dose of Levamir subq, will stop insulin drip after 2 hrs     • Hypertension    • Hypokalemia 05/27/2022    Will replace as needed. Will be cautious in the setting of ESRD to avoid need for emergency dialysis   Monitor Qtc intervals on EKG     • Hypomagnesemia 06/27/2021    Monitor and replace   • Morbid obesity    • Nephrolithiasis    • On mechanically assisted ventilation 06/26/2022    Vent management and sedation orders placed.  - [facility] intensivist group consulted for vent management appreciate recommendations  - plan to extubate today     • Peripheral vascular disease    • Pleural effusion on right 06/26/2022    CXR  on 6/30/22 read as a small upper left pulmonary edema vs early pneumonia.  Last Echo 1/2022 EF 61-65 % Continue to monitor  Procal slightly improved, CRP improved On Linezolid and meropenum      • PVD (peripheral vascular disease)    • SIRS (systemic inflammatory response syndrome) 01/09/2022    Admission  - WBC 17.78   -   - RR 16 - 1/10: VSS/wnl - CXR - Mild pulm edema - Blood cultures no growth at 24 hours  - Procalcitonin 0.29 - UA : glucose 1000, negative Leucocytes/nitrate - Empiric Zosyn and Vancomcyin    • Sleep apnea    • Substance abuse    • Vitamin D deficiency      Past Surgical History:   Procedure Laterality Date   • AMPUTATION DIGIT Right 2/27/2023    Procedure: Excision of right first metatarsal head, right second toe amputation;  Surgeon: Jean Oliveira DPM;  Location: [facility];  Service: Podiatry;  Laterality: Right;   • BELOW KNEE AMPUTATION Left 12/16/2022    Procedure: AMPUTATION BELOW KNEE, LEFT;  Surgeon: Huy White MD;  Location: [facility];  Service: Orthopedics;  Laterality: Left;   • CARDIAC CATHETERIZATION N/A 07/14/2020   • CARDIAC CATHETERIZATION N/A 04/23/2021    Procedure: Left Heart Cath;  Surgeon: Melba Romo MD;  Location: [facility] CATH INVASIVE LOCATION;  Service: Cardiology;  Laterality: N/A;   • CARDIAC CATHETERIZATION N/A 04/30/2021    Procedure: Percutaneous Coronary Intervention;  Surgeon: Russell Voss MD;  Location: [facility] CATH INVASIVE LOCATION;  Service: Cardiovascular;  Laterality: N/A;   • CARDIAC CATHETERIZATION N/A 04/30/2021    Procedure: Stent NIKKI coronary;  Surgeon: Russell Voss MD;  Location: [facility] CATH INVASIVE LOCATION;  Service: Cardiovascular;  Laterality: N/A;   • CARDIAC CATHETERIZATION Left 11/13/2021    Procedure: Left Heart Cath;  Surgeon: Niall Rios MD;  Location: [facility] CATH INVASIVE LOCATION;  Service: Cardiology;  Laterality: Left;   • CAROTID STENT Left    • COLONOSCOPY     • COLONOSCOPY N/A  05/14/2021    Procedure: COLONOSCOPY;  Surgeon: Mingo Duarte MD;  Location: [facility] ENDOSCOPY;  Service: Gastroenterology;  Laterality: N/A;   • CORONARY ARTERY BYPASS GRAFT N/A 2013    CABG X 3   • CYSTOSCOPY BLADDER STONE LITHOTRIPSY Bilateral    • ENDOSCOPY N/A 04/12/2021    Procedure: ESOPHAGOGASTRODUODENOSCOPY;  Surgeon: Mingo Duarte MD;  Location: [facility] ENDOSCOPY;  Service: Gastroenterology;  Laterality: N/A;   • ENDOSCOPY N/A 05/14/2021    Procedure: ESOPHAGOGASTRODUODENOSCOPY;  Surgeon: Mingo Duarte MD;  Location: [facility] ENDOSCOPY;  Service: Gastroenterology;  Laterality: N/A;   • ENDOSCOPY N/A 01/28/2022    Procedure: ESOPHAGOGASTRODUODENOSCOPY;  Surgeon: Mingo Duarte MD;  Location: [facility] ENDOSCOPY;  Service: Gastroenterology;  Laterality: N/A;   • HYSTERECTOMY     • INTERVENTIONAL RADIOLOGY PROCEDURE N/A 10/21/2021    Procedure: tunneled central venous catheter placement;  Surgeon: Donnie Robles MD;  Location: [facility] ANGIO INVASIVE LOCATION;  Service: Interventional Radiology;  Laterality: N/A;   • INTERVENTIONAL RADIOLOGY PROCEDURE N/A 01/27/2022    Procedure: tunneled central venous catheter placement;  Surgeon: Donnie Robles MD;  Location: [facility] ANGIO INVASIVE LOCATION;  Service: Interventional Radiology;  Laterality: N/A;   • LAPAROSCOPIC TUBAL LIGATION     • TUNNELED VENOUS CATHETER PLACEMENT       Social History     Socioeconomic History   • Marital status: Legally      Spouse name: wesley dumont   Tobacco Use   • Smoking status: Every Day     Packs/day: 0.25     Years: 46.00     Pack years: 11.50     Types: Cigarettes     Start date: 2/1/1999   • Smokeless tobacco: Never   Vaping Use   • Vaping Use: Never used   Substance and Sexual Activity   • Alcohol use: No   • Drug use: Not Currently     Types: LSD, Marijuana, Methamphetamines   • Sexual activity: Defer       Allergies:  Allergies   Allergen Reactions   • Adhesive Tape Hives, Other (See  Comments) and Rash   • Nsaids Hives   • Latex Other (See Comments) and Hives   • Other Itching     Bandaids, MRSA, SURECLOSE   • Wound Dressing Adhesive Hives and Rash       Medications:    Current Facility-Administered Medications:   •  acetaminophen (TYLENOL) tablet 650 mg, 650 mg, Oral, Q8H PRN, Jef Ramires MD, 650 mg at 05/16/23 0754  •  albuterol (PROVENTIL) nebulizer solution 0.083% 2.5 mg/3mL, 2.5 mg, Nebulization, Q4H PRN, Jef Ramires MD  •  atorvastatin (LIPITOR) tablet 40 mg, 40 mg, Oral, Daily, Jef Ramires MD, 40 mg at 05/17/23 0808  •  carvedilol (COREG) tablet 6.25 mg, 6.25 mg, Oral, BID With Meals, Jef Ramires MD, 6.25 mg at 05/17/23 0808  •  cetirizine (zyrTEC) tablet 5 mg, 5 mg, Oral, Daily, Jef Ramires MD, 5 mg at 05/17/23 0808  •  clopidogrel (PLAVIX) tablet 75 mg, 75 mg, Oral, Daily, Jef Ramires MD, 75 mg at 05/17/23 0807  •  cyclobenzaprine (FLEXERIL) tablet 5 mg, 5 mg, Oral, BID, Jef Ramires MD, 5 mg at 05/17/23 0807  •  dextrose (D50W) (25 g/50 mL) IV injection 25 g, 25 g, Intravenous, Q15 Min PRN, Jef Ramires MD  •  dextrose (GLUTOSE) oral gel 15 g, 15 g, Oral, Q15 Min PRN, Jef Ramires MD  •  docusate sodium (COLACE) capsule 100 mg, 100 mg, Oral, BID, Jef Ramires MD, 100 mg at 05/17/23 0807  •  DULoxetine (CYMBALTA) DR capsule 20 mg, 20 mg, Oral, Daily, Jef Ramires MD, 20 mg at 05/17/23 0808  •  glucagon HCl (Diagnostic) injection 1 mg, 1 mg, Intramuscular, Q15 Min PRN, Jef Ramires MD  •  heparin (porcine) 5000 UNIT/ML injection 5,000 Units, 5,000 Units, Subcutaneous, Q12H, Jef Ramires MD, 5,000 Units at 05/17/23 0808  •  heparin (porcine) injection 4,000 Units, 4,000 Units, Intracatheter, PRN, Ace Lauren MD  •  hydrOXYzine (ATARAX) tablet 25 mg, 25 mg, Oral, Q8H PRN, Jef Ramires MD, 25 mg at 05/17/23 0808  •  Insulin Aspart (novoLOG) injection 0-9 Units, 0-9 Units, Subcutaneous, TID AC,  Jef Ramires MD, 6 Units at 05/17/23 0808  •  Insulin Aspart (novoLOG) injection 14 Units, 14 Units, Subcutaneous, TID With Meals, Jef Ramires MD, 14 Units at 05/17/23 0808  •  insulin detemir (LEVEMIR) injection 24 Units, 24 Units, Subcutaneous, Nightly, Jef Ramires MD, 24 Units at 05/16/23 2153  •  ipratropium-albuterol (DUO-NEB) nebulizer solution 3 mL, 3 mL, Nebulization, 4x Daily PRN, Jef Ramires MD  •  lidocaine-prilocaine (EMLA) 2.5-2.5 % cream, , Topical, PRN, Ace Lauren MD, Given at 05/16/23 0720  •  losartan (COZAAR) tablet 50 mg, 50 mg, Oral, Nightly, Ace Lauren MD, 50 mg at 05/16/23 2153  •  memantine (NAMENDA) tablet 5 mg, 5 mg, Oral, BID, Jef Ramires MD, 5 mg at 05/17/23 0808  •  meropenem (MERREM) 500mg/100 mL 0.9% NS IVPB (mbp), 500 mg, Intravenous, Q24H, Jef Ramires MD, Stopped at 05/16/23 1921  •  nitroglycerin (NITROSTAT) SL tablet 0.4 mg, 0.4 mg, Sublingual, Q5 Min PRN, Jef Ramires MD  •  nystatin (MYCOSTATIN) powder, , Topical, Q12H, Simeon Peña MD, Given at 05/17/23 0813  •  ondansetron (ZOFRAN) tablet 4 mg, 4 mg, Oral, Q6H PRN, Jef Ramires MD, 4 mg at 05/16/23 1212  •  oxyCODONE (ROXICODONE) immediate release tablet 10 mg, 10 mg, Oral, TID, Jef Ramires MD, 10 mg at 05/17/23 0808  •  pantoprazole (PROTONIX) EC tablet 40 mg, 40 mg, Oral, Nightly, Jef Ramires MD, 40 mg at 05/16/23 2153  •  Pharmacy to Dose meropenem (MERREM), , Does not apply, Continuous PRN, Jef Ramires MD  •  promethazine (PHENERGAN) tablet 25 mg, 25 mg, Oral, Q6H PRN, Jef Ramires MD, 25 mg at 05/16/23 1736  •  sevelamer (RENVELA) tablet 800 mg, 800 mg, Oral, TID With Meals, Jef Ramires MD, 800 mg at 05/17/23 0807  •  sodium chloride 0.9 % flush 10 mL, 10 mL, Intravenous, PRN, Jef Ramires MD  •  sodium chloride 0.9 % flush 10 mL, 10 mL, Intravenous, Q12H, Jef Ramires MD, 10 mL at 05/17/23 0813  •  sodium  chloride 0.9 % flush 10 mL, 10 mL, Intravenous, PRN, Jef Ramires MD  •  sodium chloride 0.9 % infusion 40 mL, 40 mL, Intravenous, PRN, Jef Ramires MD        OBJECTIVE     Vitals:    05/17/23 0719   BP: 142/82   Pulse: 80   Resp: 16   Temp: 97.2 °F (36.2 °C)   SpO2: 96%       PHYSICAL EXAM  Physical Exam  Vitals reviewed. Nursing notes reviewed.  Constitutional:       Appearance: Well-developed.     Musculoskeletal:         Cervical back: Normal range of motion and neck supple.   Skin:     General: Skin is warm and dry.      Coloration: Skin is not pale.      Findings: No erythema or rash. Wound to right great toe.  Neurological:      Mental Status: Pt is alert and oriented to person, place, and time.   Psychiatric:         Behavior: Behavior normal.         Thought Content: Thought content normal.         Judgment: Judgment normal.       Results Review:  Lab Results (last 48 hours)     Procedure Component Value Units Date/Time    POC Glucose Once [[id number]]  (Abnormal) Collected: 05/15/23 1024    Specimen: Blood Updated: 05/15/23 1045     Glucose 225 mg/dL      Comment: RN NotifiedOperator: [id number] JUAQUIN CONTRERASNIFERMeter ID: [id number]       POC Glucose Once [[id number]]  (Abnormal) Collected: 05/15/23 0610    Specimen: Blood Updated: 05/15/23 0638     Glucose 192 mg/dL      Comment: RN NotifiedOperator: [id number] MERYL MADISONMeter ID: [id number]       POC Glucose Once [[id number]]  (Abnormal) Collected: 05/14/23 2028    Specimen: Blood Updated: 05/14/23 2055     Glucose 185 mg/dL      Comment: RN NotifiedOperator: [id number] MERYL MADISONMeter ID: [id number]       POC Glucose Once [[id number]]  (Abnormal) Collected: 05/14/23 1611    Specimen: Blood Updated: 05/14/23 1636     Glucose 154 mg/dL      Comment: RN NotifiedOperator: [id number] KEVIN TIFFANYMeter ID: [id number]       POC Glucose Once [[id number]]  (Abnormal) Collected: 05/14/23 1053    Specimen: Blood Updated: 05/14/23 1119      Glucose 303 mg/dL      Comment: Result Not ConfirmedOperator: [id number] KEVIN Hung ID: [id number]       Basic Metabolic Panel [[id number]]  (Abnormal) Collected: 05/14/23 0552    Specimen: Blood Updated: 05/14/23 0658     Glucose 157 mg/dL      BUN 36 mg/dL      Creatinine 4.14 mg/dL      Sodium 133 mmol/L      Potassium 4.2 mmol/L      Chloride 91 mmol/L      CO2 25.0 mmol/L      Calcium 10.1 mg/dL      BUN/Creatinine Ratio 8.7     Anion Gap 17.0 mmol/L      eGFR 11.5 mL/min/1.73      Comment: <15 Indicative of kidney failure       Narrative:      GFR Normal >60  Chronic Kidney Disease <60  Kidney Failure <15      POC Glucose Once [[id number]]  (Abnormal) Collected: 05/14/23 0546    Specimen: Blood Updated: 05/14/23 0649     Glucose 154 mg/dL      Comment: RN NotifiedOperator: [id number] PATRICIA Pratt ID: [id number]       CBC & Differential [[id number]]  (Abnormal) Collected: 05/14/23 0552    Specimen: Blood Updated: 05/14/23 0630    Narrative:      The following orders were created for panel order CBC & Differential.  Procedure                               Abnormality         Status                     ---------                               -----------         ------                     CBC Auto Differential[[id number]]        Abnormal            Final result                 Please view results for these tests on the individual orders.    CBC Auto Differential [[id number]]  (Abnormal) Collected: 05/14/23 0552    Specimen: Blood Updated: 05/14/23 0630     WBC 7.83 10*3/mm3      RBC 3.98 10*6/mm3      Hemoglobin 11.5 g/dL      Hematocrit 35.9 %      MCV 90.2 fL      MCH 28.9 pg      MCHC 32.0 g/dL      RDW 14.5 %      RDW-SD 47.7 fl      MPV 8.7 fL      Platelets 238 10*3/mm3      Neutrophil % 52.6 %      Lymphocyte % 30.8 %      Monocyte % 10.1 %      Eosinophil % 4.0 %      Basophil % 1.1 %      Immature Grans % 1.4 %      Neutrophils, Absolute 4.12 10*3/mm3      Lymphocytes, Absolute 2.41  10*3/mm3      Monocytes, Absolute 0.79 10*3/mm3      Eosinophils, Absolute 0.31 10*3/mm3      Basophils, Absolute 0.09 10*3/mm3      Immature Grans, Absolute 0.11 10*3/mm3      nRBC 0.0 /100 WBC     POC Glucose Once [[id number]]  (Normal) Collected: 05/13/23 2016    Specimen: Blood Updated: 05/13/23 2112     Glucose 121 mg/dL      Comment: RN NotifiedOperator: [id number] PATRICIA POLLOCKMeter ID: [id number]       POC Glucose Once [[id number]]  (Normal) Collected: 05/13/23 1623    Specimen: Blood Updated: 05/13/23 1649     Glucose 112 mg/dL      Comment: RN NotifiedOperator: [id number] KEVIN Hand TalkDOYLEYMeter ID: [id number]       POC Glucose Once [[id number]]  (Abnormal) Collected: 05/13/23 0558    Specimen: Blood Updated: 05/13/23 1319     Glucose 131 mg/dL      Comment: RN NotifiedOperator: [id number] PATRICIA POLLOCKMeter ID: [id number]       POC Glucose Once [[id number]]  (Abnormal) Collected: 05/13/23 1035    Specimen: Blood Updated: 05/13/23 1140     Glucose 202 mg/dL      Comment: RN NotifiedOperator: [id number] KEVIN BAHENAYMeter ID: [id number]           Imaging Results (Last 72 Hours)     ** No results found for the last 72 hours. **             ASSESSMENT/PLAN       Examination and evaluation of wound(s) was performed.    DIAGNOSIS:     Post op wound dehiscence, right foot  Diabetes Mellitus with foot ulcer    PLAN:     Woundvac changed. Black foam placed and bridged to top of foot. No bone or tendon exposure. Weight bearing to heel. Will change on Friday. Call with questions.    Discussed findings and treatment plan including risks, benefits, and treatment options with patient in detail. Patient agreed with treatment plan.          This document has been electronically signed by BRIDGETT Corbett on May 17, 2023 10:07 CDT

## 2023-05-18 LAB
ANION GAP SERPL CALCULATED.3IONS-SCNC: 20 MMOL/L (ref 5–15)
BASOPHILS # BLD AUTO: 0.11 10*3/MM3 (ref 0–0.2)
BASOPHILS NFR BLD AUTO: 1 % (ref 0–1.5)
BUN SERPL-MCNC: 66 MG/DL (ref 8–23)
BUN/CREAT SERPL: 10.4 (ref 7–25)
CALCIUM SPEC-SCNC: 10.1 MG/DL (ref 8.6–10.5)
CHLORIDE SERPL-SCNC: 91 MMOL/L (ref 98–107)
CO2 SERPL-SCNC: 24 MMOL/L (ref 22–29)
CREAT SERPL-MCNC: 6.37 MG/DL (ref 0.57–1)
DEPRECATED RDW RBC AUTO: 46.1 FL (ref 37–54)
EGFRCR SERPLBLD CKD-EPI 2021: 6.8 ML/MIN/1.73
EOSINOPHIL # BLD AUTO: 0.39 10*3/MM3 (ref 0–0.4)
EOSINOPHIL NFR BLD AUTO: 3.6 % (ref 0.3–6.2)
ERYTHROCYTE [DISTWIDTH] IN BLOOD BY AUTOMATED COUNT: 14.3 % (ref 12.3–15.4)
GLUCOSE BLDC GLUCOMTR-MCNC: 212 MG/DL (ref 70–130)
GLUCOSE BLDC GLUCOMTR-MCNC: 263 MG/DL (ref 70–130)
GLUCOSE BLDC GLUCOMTR-MCNC: 274 MG/DL (ref 70–130)
GLUCOSE BLDC GLUCOMTR-MCNC: 278 MG/DL (ref 70–130)
GLUCOSE SERPL-MCNC: 258 MG/DL (ref 65–99)
HCT VFR BLD AUTO: 37.4 % (ref 34–46.6)
HGB BLD-MCNC: 12.3 G/DL (ref 12–15.9)
IMM GRANULOCYTES # BLD AUTO: 0.09 10*3/MM3 (ref 0–0.05)
IMM GRANULOCYTES NFR BLD AUTO: 0.8 % (ref 0–0.5)
LYMPHOCYTES # BLD AUTO: 3.14 10*3/MM3 (ref 0.7–3.1)
LYMPHOCYTES NFR BLD AUTO: 29 % (ref 19.6–45.3)
MCH RBC QN AUTO: 29.1 PG (ref 26.6–33)
MCHC RBC AUTO-ENTMCNC: 32.9 G/DL (ref 31.5–35.7)
MCV RBC AUTO: 88.4 FL (ref 79–97)
MONOCYTES # BLD AUTO: 0.81 10*3/MM3 (ref 0.1–0.9)
MONOCYTES NFR BLD AUTO: 7.5 % (ref 5–12)
NEUTROPHILS NFR BLD AUTO: 58.1 % (ref 42.7–76)
NEUTROPHILS NFR BLD AUTO: 6.29 10*3/MM3 (ref 1.7–7)
NRBC BLD AUTO-RTO: 0 /100 WBC (ref 0–0.2)
PLATELET # BLD AUTO: 201 10*3/MM3 (ref 140–450)
PMV BLD AUTO: 8.6 FL (ref 6–12)
POTASSIUM SERPL-SCNC: 4.8 MMOL/L (ref 3.5–5.2)
QT INTERVAL: 446 MS
QTC INTERVAL: 491 MS
RBC # BLD AUTO: 4.23 10*6/MM3 (ref 3.77–5.28)
SODIUM SERPL-SCNC: 135 MMOL/L (ref 136–145)
WBC NRBC COR # BLD: 10.83 10*3/MM3 (ref 3.4–10.8)

## 2023-05-18 PROCEDURE — 96366 THER/PROPH/DIAG IV INF ADDON: CPT

## 2023-05-18 PROCEDURE — G0257 UNSCHED DIALYSIS ESRD PT HOS: HCPCS

## 2023-05-18 PROCEDURE — 85025 COMPLETE CBC W/AUTO DIFF WBC: CPT | Performed by: STUDENT IN AN ORGANIZED HEALTH CARE EDUCATION/TRAINING PROGRAM

## 2023-05-18 PROCEDURE — G0378 HOSPITAL OBSERVATION PER HR: HCPCS

## 2023-05-18 PROCEDURE — 94799 UNLISTED PULMONARY SVC/PX: CPT

## 2023-05-18 PROCEDURE — 97110 THERAPEUTIC EXERCISES: CPT

## 2023-05-18 PROCEDURE — 63710000001 INSULIN ASPART PER 5 UNITS: Performed by: HOSPITALIST

## 2023-05-18 PROCEDURE — 96372 THER/PROPH/DIAG INJ SC/IM: CPT

## 2023-05-18 PROCEDURE — 63710000001 INSULIN DETEMIR PER 5 UNITS: Performed by: HOSPITALIST

## 2023-05-18 PROCEDURE — 25010000002 HEPARIN (PORCINE) PER 1000 UNITS: Performed by: HOSPITALIST

## 2023-05-18 PROCEDURE — 82948 REAGENT STRIP/BLOOD GLUCOSE: CPT

## 2023-05-18 PROCEDURE — 25010000002 HEPARIN (PORCINE) PER 1000 UNITS: Performed by: INTERNAL MEDICINE

## 2023-05-18 PROCEDURE — 63710000001 ONDANSETRON PER 8 MG: Performed by: HOSPITALIST

## 2023-05-18 PROCEDURE — 25010000002 MEROPENEM PER 100 MG: Performed by: HOSPITALIST

## 2023-05-18 PROCEDURE — 80048 BASIC METABOLIC PNL TOTAL CA: CPT | Performed by: STUDENT IN AN ORGANIZED HEALTH CARE EDUCATION/TRAINING PROGRAM

## 2023-05-18 RX ADMIN — HEPARIN SODIUM 5000 UNITS: 5000 INJECTION INTRAVENOUS; SUBCUTANEOUS at 07:49

## 2023-05-18 RX ADMIN — SEVELAMER CARBONATE 800 MG: 800 TABLET, FILM COATED ORAL at 12:51

## 2023-05-18 RX ADMIN — OXYCODONE HYDROCHLORIDE 10 MG: 5 TABLET ORAL at 07:48

## 2023-05-18 RX ADMIN — CARVEDILOL 6.25 MG: 6.25 TABLET, FILM COATED ORAL at 17:15

## 2023-05-18 RX ADMIN — SEVELAMER CARBONATE 800 MG: 800 TABLET, FILM COATED ORAL at 17:15

## 2023-05-18 RX ADMIN — INSULIN DETEMIR 24 UNITS: 100 INJECTION, SOLUTION SUBCUTANEOUS at 20:17

## 2023-05-18 RX ADMIN — CYCLOBENZAPRINE 5 MG: 5 TABLET, FILM COATED ORAL at 07:48

## 2023-05-18 RX ADMIN — INSULIN ASPART 6 UNITS: 100 INJECTION, SOLUTION INTRAVENOUS; SUBCUTANEOUS at 07:22

## 2023-05-18 RX ADMIN — PANTOPRAZOLE SODIUM 40 MG: 40 TABLET, DELAYED RELEASE ORAL at 20:18

## 2023-05-18 RX ADMIN — DULOXETINE HYDROCHLORIDE 20 MG: 20 CAPSULE, DELAYED RELEASE ORAL at 07:48

## 2023-05-18 RX ADMIN — NYSTATIN: 100000 POWDER TOPICAL at 20:23

## 2023-05-18 RX ADMIN — MEROPENEM 500 MG: 500 INJECTION, POWDER, FOR SOLUTION INTRAVENOUS at 14:05

## 2023-05-18 RX ADMIN — INSULIN ASPART 14 UNITS: 100 INJECTION, SOLUTION INTRAVENOUS; SUBCUTANEOUS at 12:52

## 2023-05-18 RX ADMIN — OXYCODONE HYDROCHLORIDE 10 MG: 5 TABLET ORAL at 16:21

## 2023-05-18 RX ADMIN — SEVELAMER CARBONATE 800 MG: 800 TABLET, FILM COATED ORAL at 07:48

## 2023-05-18 RX ADMIN — ATORVASTATIN CALCIUM 40 MG: 40 TABLET, FILM COATED ORAL at 07:49

## 2023-05-18 RX ADMIN — CETIRIZINE HYDROCHLORIDE 5 MG: 5 TABLET ORAL at 07:49

## 2023-05-18 RX ADMIN — INSULIN ASPART 6 UNITS: 100 INJECTION, SOLUTION INTRAVENOUS; SUBCUTANEOUS at 17:16

## 2023-05-18 RX ADMIN — LIDOCAINE AND PRILOCAINE: 25; 25 CREAM TOPICAL at 06:34

## 2023-05-18 RX ADMIN — INSULIN ASPART 14 UNITS: 100 INJECTION, SOLUTION INTRAVENOUS; SUBCUTANEOUS at 17:15

## 2023-05-18 RX ADMIN — INSULIN ASPART 14 UNITS: 100 INJECTION, SOLUTION INTRAVENOUS; SUBCUTANEOUS at 07:22

## 2023-05-18 RX ADMIN — LOSARTAN POTASSIUM 50 MG: 50 TABLET, FILM COATED ORAL at 20:18

## 2023-05-18 RX ADMIN — MEMANTINE 5 MG: 5 TABLET ORAL at 07:48

## 2023-05-18 RX ADMIN — HEPARIN SODIUM 5000 UNITS: 5000 INJECTION INTRAVENOUS; SUBCUTANEOUS at 20:18

## 2023-05-18 RX ADMIN — INSULIN ASPART 6 UNITS: 100 INJECTION, SOLUTION INTRAVENOUS; SUBCUTANEOUS at 12:51

## 2023-05-18 RX ADMIN — OXYCODONE HYDROCHLORIDE 10 MG: 5 TABLET ORAL at 20:18

## 2023-05-18 RX ADMIN — ONDANSETRON HYDROCHLORIDE 4 MG: 4 TABLET, FILM COATED ORAL at 14:10

## 2023-05-18 RX ADMIN — CLOPIDOGREL BISULFATE 75 MG: 75 TABLET ORAL at 07:49

## 2023-05-18 RX ADMIN — DOCUSATE SODIUM 100 MG: 100 CAPSULE, LIQUID FILLED ORAL at 20:18

## 2023-05-18 RX ADMIN — MEMANTINE 5 MG: 5 TABLET ORAL at 20:18

## 2023-05-18 RX ADMIN — HEPARIN SODIUM 4000 UNITS: 1000 INJECTION INTRAVENOUS; SUBCUTANEOUS at 08:30

## 2023-05-18 RX ADMIN — CYCLOBENZAPRINE 5 MG: 5 TABLET, FILM COATED ORAL at 20:18

## 2023-05-18 NOTE — THERAPY TREATMENT NOTE
Patient Name: Yamileth Slater  : 1960    MRN: [id number]                              Today's Date: 2023       Admit Date: 2023    Visit Dx:     ICD-10-CM ICD-9-CM   1. ESRD (end stage renal disease) on dialysis  N18.6 585.6    Z99.2 V45.11   2. Hyperkalemia  E87.5 276.7   3. Urinary tract infection in female  N39.0 599.0   4. Impaired functional mobility, balance, gait, and endurance  Z74.09 V49.89   5. Impaired mobility and ADLs  Z74.09 V49.89    Z78.9      Patient Active Problem List   Diagnosis   • Chronic midline low back pain without sciatica   • Vitamin D deficiency   • Tobacco dependence syndrome   • Shoulder joint pain   • Morbid obesity with BMI of 50.0-59.9, adult   • Mixed hyperlipidemia   • Kidney stone   • History of colon polyps   • GERD (gastroesophageal reflux disease)   • Excoriated eczema   • Hypertension   • COPD (chronic obstructive pulmonary disease)   • Chronic folliculitis   • Coronary artery disease involving native coronary artery of native heart without angina pectoris   • 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   • Hypothyroidism   • Carotid artery disease   • [place] abuse   • PAD (peripheral artery disease) ([facility])   • Venous insufficiency   • LAFB (left anterior fascicular block)   • Pulmonary hypertension (HCC)   • Left hip pain   • Neck pain   • MIKE and COPD overlap syndrome   • Pneumonia due to COVID-19 virus   • Hyperkalemia   • Cutaneous candidiasis   • Community acquired pneumonia of right middle lobe of lung   • MRSA infection   • Esophageal dysphagia   • Monilial esophagitis   • NSTEMI, initial episode of care   • CAD (coronary artery disease)   • Cellulitis of foot associated with diabetes mellitus   • Urinary tract infection due to Proteus   • History of insertion of tunneled central venous catheter (CVC) with port   • Anemia due to chronic kidney disease, on chronic dialysis   • Pneumonitis   • Personal history of noncompliance with  medical treatment, presenting hazards to health   • Type 2 diabetes mellitus with circulatory disorder   • Physical deconditioning   • ESRD on hemodialysis   • DVT prophylaxis   • Anemia   • Ventilator associated pneumonia   • Positive fecal occult blood test   • Yeast UTI   • Gangrene of both feet   • Left foot pain   • 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   • On home oxygen therapy   • Subacute osteomyelitis of right foot   • Acute respiratory failure with hypoxia   • ESRD (end stage renal disease) on dialysis     Past Medical History:   Diagnosis Date   • Acute blood loss anemia 04/16/2017    Likely due to gastric oozing at this time. - Dr. Duarte (GI) was consulted and has now signed off, will follow up outpatient - pill colonoscopy showed AVMs - continue to monitor   • Acute cystitis with hematuria 03/31/2021 1/13: IV Rocephin 1 gm q 24 1/14 : transitioned  to omnicef 300mg. Urine cultures resulted and did not show growth. Omnicef discontinued as patient is asymptomatic   • Altered mental status 01/09/2022    - AMS on presentation - initial ABG pH 7.3, CO2 34 - Procal 0.29 - UA negative for acute cysitits -CTA head wnl  - Empiric Zosyn and Vancomycin -Lactate 2.5 on admission  - blood cultures no growth at 24 hours     • Anxiety    • CAD (coronary artery disease) 04/24/2021    S/P 3 stents 5/1/2021 for BHL Continue ASA 81mg & Clopidogrel 75mg Continue Atorvastatin 40mg   • Carotid artery stenosis    • CHF (congestive heart failure)    • Chronic obstructive lung disease    • CKD (chronic kidney disease) stage 4, GFR 15-29 ml/min    • CKD (chronic kidney disease), symptom management only, stage 5 10/05/2020    Results from last 7 days Lab Units 12/15/21 0548 12/14/21 1323 12/14/21 0916 CREATININE mg/dL 3.92* 3.21* 3.32*  Baseline creatinine 2-3 GFR 13-25 GFR 15 Dialysis MWF, sees Dr. Lauren Nephrology consult,, appreciate recommendations Continue Bumex 1mg bid daily Holding Bumex 2mg 4 times a week    • Colonic polyp    • Coronary arteriosclerosis    • Diabetes mellitus    • Diabetic neuropathy    • Ear pain, right 10/18/2021    - canal trauma due to patient scratching and DMT2 - added cortisporin ear drops   • Elevated troponin 10/12/2021    -most likely from CKD -Trending down -Neg chest pain   • Generalized abdominal pain 07/01/2022    Could be due to initiation of tube feeds vs dyspepsia vs abdominal cramps related to no PO intake due to intubation vs constipation Continue current laxative regiment  If no bowel movement by this afternoon will consider enema   • GERD (gastroesophageal reflux disease)    • GI bleed 05/13/2021    - GI will follow up outpatient - Protonix 40mg daily - Avoid medical DVT prophy and use mechanical at this time instead. - Continue to monitor - pill colonoscopy results showed AVMs   • History of transfusion    • Hypercholesterolemia    • Hyperosmolar hyperglycemic state (HHS) 06/25/2022    Serum glucose 605 on admission  Anion gap 16 PH 7.37 Bicarb 27.9 Continue fluids  Insulin drip with HHS protocol  Anion gap closed around 10 AM, received one dose of Levamir subq, will stop insulin drip after 2 hrs     • Hypertension    • Hypokalemia 05/27/2022    Will replace as needed. Will be cautious in the setting of ESRD to avoid need for emergency dialysis   Monitor Qtc intervals on EKG     • Hypomagnesemia 06/27/2021    Monitor and replace   • Morbid obesity    • Nephrolithiasis    • On mechanically assisted ventilation 06/26/2022    Vent management and sedation orders placed.  - [facility] intensivist group consulted for vent management appreciate recommendations  - plan to extubate today     • Peripheral vascular disease    • Pleural effusion on right 06/26/2022    CXR on 6/30/22 read as a small upper left pulmonary edema vs early pneumonia.  Last Echo 1/2022 EF 61-65 % Continue to monitor  Procal slightly improved, CRP improved On Linezolid and meropenum      • PVD (peripheral  vascular disease)    • SIRS (systemic inflammatory response syndrome) 01/09/2022    Admission  - WBC 17.78   -   - RR 16 - 1/10: VSS/wnl - CXR - Mild pulm edema - Blood cultures no growth at 24 hours  - Procalcitonin 0.29 - UA : glucose 1000, negative Leucocytes/nitrate - Empiric Zosyn and Vancomcyin    • Sleep apnea    • Substance abuse    • Vitamin D deficiency      Past Surgical History:   Procedure Laterality Date   • AMPUTATION DIGIT Right 2/27/2023    Procedure: Excision of right first metatarsal head, right second toe amputation;  Surgeon: Jean Oliveira DPM;  Location: [facility] OR;  Service: Podiatry;  Laterality: Right;   • BELOW KNEE AMPUTATION Left 12/16/2022    Procedure: AMPUTATION BELOW KNEE, LEFT;  Surgeon: Huy White MD;  Location: [facility] OR;  Service: Orthopedics;  Laterality: Left;   • CARDIAC CATHETERIZATION N/A 07/14/2020   • CARDIAC CATHETERIZATION N/A 04/23/2021    Procedure: Left Heart Cath;  Surgeon: Melba Romo MD;  Location: [facility] CATH INVASIVE LOCATION;  Service: Cardiology;  Laterality: N/A;   • CARDIAC CATHETERIZATION N/A 04/30/2021    Procedure: Percutaneous Coronary Intervention;  Surgeon: Russell Voss MD;  Location: [facility] CATH INVASIVE LOCATION;  Service: Cardiovascular;  Laterality: N/A;   • CARDIAC CATHETERIZATION N/A 04/30/2021    Procedure: Stent NIKKI coronary;  Surgeon: Russell Voss MD;  Location: [facility] CATH INVASIVE LOCATION;  Service: Cardiovascular;  Laterality: N/A;   • CARDIAC CATHETERIZATION Left 11/13/2021    Procedure: Left Heart Cath;  Surgeon: Niall Rios MD;  Location: [facility] CATH INVASIVE LOCATION;  Service: Cardiology;  Laterality: Left;   • CAROTID STENT Left    • COLONOSCOPY     • COLONOSCOPY N/A 05/14/2021    Procedure: COLONOSCOPY;  Surgeon: Mingo Duarte MD;  Location: [facility] ENDOSCOPY;  Service: Gastroenterology;  Laterality: N/A;   • CORONARY ARTERY BYPASS GRAFT N/A 2013    CABG X 3   • CYSTOSCOPY  BLADDER STONE LITHOTRIPSY Bilateral    • ENDOSCOPY N/A 04/12/2021    Procedure: ESOPHAGOGASTRODUODENOSCOPY;  Surgeon: Mingo Duarte MD;  Location: [facility] ENDOSCOPY;  Service: Gastroenterology;  Laterality: N/A;   • ENDOSCOPY N/A 05/14/2021    Procedure: ESOPHAGOGASTRODUODENOSCOPY;  Surgeon: Mingo Duarte MD;  Location: [facility] ENDOSCOPY;  Service: Gastroenterology;  Laterality: N/A;   • ENDOSCOPY N/A 01/28/2022    Procedure: ESOPHAGOGASTRODUODENOSCOPY;  Surgeon: Mingo Duarte MD;  Location: [facility] ENDOSCOPY;  Service: Gastroenterology;  Laterality: N/A;   • HYSTERECTOMY     • INTERVENTIONAL RADIOLOGY PROCEDURE N/A 10/21/2021    Procedure: tunneled central venous catheter placement;  Surgeon: Donnie Robles MD;  Location: [facility] ANGIO INVASIVE LOCATION;  Service: Interventional Radiology;  Laterality: N/A;   • INTERVENTIONAL RADIOLOGY PROCEDURE N/A 01/27/2022    Procedure: tunneled central venous catheter placement;  Surgeon: Donnie Robles MD;  Location: [facility] ANGIO INVASIVE LOCATION;  Service: Interventional Radiology;  Laterality: N/A;   • LAPAROSCOPIC TUBAL LIGATION     • TUNNELED VENOUS CATHETER PLACEMENT        General Information     Row Name 05/18/23 1327          OT Time and Intention    Document Type therapy note (daily note)  -KD     Mode of Treatment individual therapy;occupational therapy  -KD     Row Name 05/18/23 1327          General Information    Patient Profile Reviewed yes  -KD     Existing Precautions/Restrictions fall  -KD     Row Name 05/18/23 1327          Cognition    Orientation Status (Cognition) oriented x 4  -KD     Row Name 05/18/23 1327          Safety Issues, Functional Mobility    Impairments Affecting Function (Mobility) balance;endurance/activity tolerance;strength  -KD           User Key  (r) = Recorded By, (t) = Taken By, (c) = Cosigned By    Initials Name Provider Type    Rosanna Varner COTA Occupational Therapist Assistant                  Mobility/ADL's     Row Name 05/18/23 1327          Bed Mobility    Bed Mobility supine-sit  -     Supine-Sit [place] (Bed Mobility) standby assist  -     Sit-Supine [place] (Bed Mobility) standby assist  -[facility] Name 05/18/23 132          Activities of Daily Living    BADL Assessment/Intervention upper body dressing  -[facility] Name 05/18/23 1327          Mobility    Extremity Weight-bearing Status right lower extremity  -     Right Lower Extremity (Weight-bearing Status) partial weight-bearing (PWB)  -[facility] Name 05/18/23 132          Upper Body Dressing Assessment/Training    [place] Level (Upper Body Dressing) upper body dressing skills;doff;don;standby assist  HG  -     Position (Upper Body Dressing) edge of bed sitting  -           User Key  (r) = Recorded By, (t) = Taken By, (c) = Cosigned By    Initials Name Provider Type     Rosanna Mullen COTA Occupational Therapist Assistant               Obj/Interventions     [facility] Name 05/18/23 1327          Shoulder (Therapeutic Exercise)    Shoulder (Therapeutic Exercise) AROM (active range of motion)  -     Shoulder AROM (Therapeutic Exercise) bilateral;flexion;extension;aBduction;aDduction;external rotation;internal rotation  -[facility] Name 05/18/23 1327          Elbow/Forearm (Therapeutic Exercise)    Elbow/Forearm (Therapeutic Exercise) AROM (active range of motion)  -     Elbow/Forearm AROM (Therapeutic Exercise) bilateral;flexion;extension;supination;pronation  -[facility] Name 05/18/23 1327          Wrist (Therapeutic Exercise)    Wrist (Therapeutic Exercise) AROM (active range of motion)  -     Wrist AROM (Therapeutic Exercise) bilateral;flexion;extension  -[facility] Name 05/18/23 1327          Hand (Therapeutic Exercise)    Hand (Therapeutic Exercise) AROM (active range of motion)  -     Hand AROM/AAROM (Therapeutic Exercise) bilateral;finger flexion;finger extension  -     Row Name 05/18/23 1327           Motor Skills    Therapeutic Exercise shoulder;elbow/forearm;wrist;hand  -KD           User Key  (r) = Recorded By, (t) = Taken By, (c) = Cosigned By    Initials Name Provider Type    Rosanna Varner COTA Occupational Therapist Assistant               Goals/Plan     Row Name 05/18/23 1327          Transfer Goal 1 (OT)    Activity/Assistive Device (Transfer Goal 1, OT) commode, bedside without drop arms;walker, rolling;wheelchair transfer  -KD     [place] Level/Cues Needed (Transfer Goal 1, OT) modified independence  -KD     Time Frame (Transfer Goal 1, OT) long term goal (LTG);by discharge  -KD     Progress/Outcome (Transfer Goal 1, OT) goal not met  -KD     Row Name 05/18/23 1327          Bathing Goal 1 (OT)    Activity/Device (Bathing Goal 1, OT) lower body bathing  -KD     [place] Level/Cues Needed (Bathing Goal 1, OT) modified independence  -KD     Time Frame (Bathing Goal 1, OT) long term goal (LTG);by discharge  -KD     Progress/Outcomes (Bathing Goal 1, OT) goal not met  -KD     [facility] Name 05/18/23 1327          Dressing Goal 1 (OT)    Activity/Device (Dressing Goal 1, OT) lower body dressing  -KD     [place]/Cues Needed (Dressing Goal 1, OT) modified independence  -KD     Time Frame (Dressing Goal 1, OT) long term goal (LTG);by discharge  -KD     Progress/Outcome (Dressing Goal 1, OT) goal not met  -KD     [facility] Name 05/18/23 1327          Toileting Goal 1 (OT)    Activity/Device (Toileting Goal 1, OT) toileting skills, all  -KD     [place] Level/Cues Needed (Toileting Goal 1, OT) modified independence  -KD     Time Frame (Toileting Goal 1, OT) long term goal (LTG);by discharge  -KD     Progress/Outcome (Toileting Goal 1, OT) goal not met  -KD           User Key  (r) = Recorded By, (t) = Taken By, (c) = Cosigned By    Initials Name Provider Type    Rosanna Varner COTA Occupational Therapist Assistant               Clinical Impression     Row Name 05/18/23 1327          Pain Assessment     Pretreatment Pain Rating 5/10  -KD     Posttreatment Pain Rating 5/10  -KD     Pain Location lower  -KD     Pain Location - back  -KD     Row Name 05/18/23 1327          Plan of Care Review    Plan of Care Reviewed With patient  -KD     Outcome Evaluation OT tx completed this date. Sup-sit-SBA. Pt SBA w/ set up to doff and don HG. Pt sat EOB and completed BUE ther ex w/ 2lb HW and good tolerance. Pt  became nauseated at end of tx. Pt sister present in room. Pt w/ all needs met upon exit. Cont OT POC.  -KD     Row Name 05/18/23 1327          Therapy Assessment/Plan (OT)    Therapy Frequency (OT) other (see comments)  5-7 d/wk  -KD     Row Name 05/18/23 1327          Therapy Plan Review/Discharge Plan (OT)    Anticipated Discharge Disposition (OT) home with assist;home with outpatient therapy services  -KD     Row Name 05/18/23 1327          Vital Signs    Pre Systolic BP Rehab 158  -KD     Pre Treatment Diastolic BP 65  -KD     Pretreatment Heart Rate (beats/min) 84  -KD     Pre SpO2 (%) 99  -KD     O2 Delivery Pre Treatment room air  -KD     Pre Patient Position Supine  -KD     Intra Patient Position Sitting  -KD     Post Patient Position Sitting  -KD     Row Name 05/18/23 1327          Positioning and Restraints    Pre-Treatment Position in bed  -KD     Post Treatment Position bed  -KD     In Bed sitting EOB;call light within reach;encouraged to call for assist;exit alarm on;with PT  -KD           User Key  (r) = Recorded By, (t) = Taken By, (c) = Cosigned By    Initials Name Provider Type    KD Rosanna Mullen COTA Occupational Therapist Assistant               Outcome Measures     Row Name 05/18/23 1327          How much help from another is currently needed...    Putting on and taking off regular lower body clothing? 1  -KD     Bathing (including washing, rinsing, and drying) 2  -KD     Toileting (which includes using toilet bed pan or urinal) 2  -KD     Putting on and taking off regular upper body clothing  3  -KD     Taking care of personal grooming (such as brushing teeth) 4  -KD     Eating meals 4  -KD     AM-PAC 6 Clicks Score (OT) 16  -KD     Row Name 05/18/23 0725          How much help from another person do you currently need...    Turning from your back to your side while in flat bed without using bedrails? 3  -SW     Moving from lying on back to sitting on the side of a flat bed without bedrails? 3  -SW     Moving to and from a bed to a chair (including a wheelchair)? 3  -SW     Standing up from a chair using your arms (e.g., wheelchair, bedside chair)? 3  -SW     Climbing 3-5 steps with a railing? 1  -SW     To walk in hospital room? 1  -SW     AM-PAC 6 Clicks Score (PT) 14  -SW     Highest level of mobility 4 --> Transferred to chair/commode  -SW           User Key  (r) = Recorded By, (t) = Taken By, (c) = Cosigned By    Initials Name Provider Type    Rosanna Varner COTA Occupational Therapist Assistant    Becky Little, RN Registered Nurse                Occupational Therapy Education     Title: PT OT SLP Therapies (In Progress)     Topic: Occupational Therapy (In Progress)     Point: ADL training (Done)     Description:   Instruct learner(s) on proper safety adaptation and remediation techniques during self care or transfers.   Instruct in proper use of assistive devices.              Learning Progress Summary           Patient Acceptance, E,TB, VU by  at 5/17/2023 [id number]    Comment: OT POC, Role of OT, transfer training                   Point: Home exercise program (Not Started)     Description:   Instruct learner(s) on appropriate technique for monitoring, assisting and/or progressing therapeutic exercises/activities.              Learner Progress:  Not documented in this visit.          Point: Precautions (Done)     Description:   Instruct learner(s) on prescribed precautions during self-care and functional transfers.              Learning Progress Summary           Patient Acceptance,  E,TB, VU by  at 5/17/2023 1413    Comment: OT POC, Role of OT, transfer training                   Point: Body mechanics (Done)     Description:   Instruct learner(s) on proper positioning and spine alignment during self-care, functional mobility activities and/or exercises.              Learning Progress Summary           Patient Acceptance, E,TB, VU by  at 5/17/2023 1413    Comment: OT POC, Role of OT, transfer training                               User Key     Initials Effective Dates Name Provider Type [facility] 08/11/22 -  Deanna Quiles OT Occupational Therapist OT              OT Recommendation and Plan  Therapy Frequency (OT): other (see comments) (5-7 d/wk)  Plan of Care Review  Plan of Care Reviewed With: patient  Outcome Evaluation: OT tx completed this date. Sup-sit-SBA. Pt SBA w/ set up to doff and don HG. Pt sat EOB and completed BUE ther ex w/ 2lb HW and good tolerance. Pt  became nauseated at end of tx. Pt sister present in room. Pt w/ all needs met upon exit. Cont OT POC.     Time Calculation:    Time Calculation- OT     Row Name 05/18/23 1327             Time Calculation- OT    OT Start Time 1327  -KD      OT Stop Time 1350  -KD      OT Time Calculation (min) 23 min  -KD      Total Timed Code Minutes- OT 23 minute(s)  -KD      OT Received On 05/18/23  -KD         Timed Charges    74726 - OT Therapeutic Exercise Minutes 23  -KD         Total Minutes    Timed Charges Total Minutes 23  -KD       Total Minutes 23  -KD            User Key  (r) = Recorded By, (t) = Taken By, (c) = Cosigned By    Initials Name Provider Type     Rosanna Mullen COTA Occupational Therapist Assistant              Therapy Charges for Today     Code Description Service Date Service Provider Modifiers Qty    [id number] HC OT THER PROC EA 15 MIN 5/18/2023 Rosanna Mullen COTA GO 2               ISHAN Narvaez  5/18/2023

## 2023-05-18 NOTE — PLAN OF CARE
Goal Outcome Evaluation:  Plan of Care Reviewed With: patient           Outcome Evaluation: OT tx completed this date. Sup-sit-SBA. Pt SBA w/ set up to doff and don HG. Pt sat EOB and completed BUE ther ex w/ 2lb HW and good tolerance. Pt  became nauseated at end of tx. Pt sister present in room. Pt w/ all needs met upon exit. Cont OT POC.

## 2023-05-18 NOTE — PROGRESS NOTES
"[facility]  [address] [place] [address]. [address]  T - [phone]  F - [phone]     Progress Note          PATIENT  DEMOGRAPHICS   PATIENT NAME: Yamileth Slater                      PHYSICIAN: Edis Goldsmith MD  : 1960  MRN: [id number]   LOS: 4 days    Patient Care Team:  Kiersten Wilson APRN as PCP - General (Family Medicine)  Subjective   SUBJECTIVE   Reports feeling good today, chest feels better with tegaderm instead of tape.  at foot of bed.   Seen during hd        Objective   OBJECTIVE   Vital Signs  Temp:  [96.2 °F (35.7 °C)-97.2 °F (36.2 °C)] 96.2 °F (35.7 °C)  Heart Rate:  [66-80] 79  Resp:  [16] 16  BP: (133-152)/(60-82) 140/78    Flowsheet Rows    Flowsheet Row First Filed Value   Admission Height 157.5 cm (62\") Documented at 2023 1051   Admission Weight 111 kg (245 lb) Documented at 2023 1051           I/O last 3 completed shifts:  In: 580 [P.O.:580]  Out: 3200 [Urine:500; Other:2700]    PHYSICAL EXAM    Physical Exam  Constitutional:       General: She is not in acute distress.     Appearance: Normal appearance. She is obese. She is not ill-appearing or diaphoretic.      Comments: Sitting up comfortably   Eyes:      Conjunctiva/sclera: Conjunctivae normal.   Cardiovascular:      Rate and Rhythm: Normal rate and regular rhythm.      Pulses: Normal pulses.      Heart sounds: Normal heart sounds.   Pulmonary:      Effort: Pulmonary effort is normal.      Breath sounds: Normal breath sounds.   Genitourinary:     Comments: Fistula intact  Musculoskeletal:      Right lower leg: No edema.      Left lower leg: No edema.   Skin:     General: Skin is warm and dry.      Comments: Tegaderm on chest, no new excoriations.    Neurological:      Mental Status: She is alert.         RESULTS   Results Review:    Results from last 7 days   Lab Units 23  1321 23  0552 23  0616 23  0734 23  1038   SODIUM mmol/L " 134* 133* 135*   < > 139   POTASSIUM mmol/L 4.4 4.2 5.2   < > 6.5*   CHLORIDE mmol/L 91* 91* 99   < > 104   CO2 mmol/L 25.0 25.0 23.0   < > 19.0*   BUN mg/dL 51* 36* 62*   < > 85*   CREATININE mg/dL 5.69* 4.14* 5.78*   < > 7.00*   CALCIUM mg/dL 10.2 10.1 9.8   < > 9.4   BILIRUBIN mg/dL  --   --   --   --  0.4   ALK PHOS U/L  --   --   --   --  282*   ALT (SGPT) U/L  --   --   --   --  13   AST (SGOT) U/L  --   --   --   --  17   GLUCOSE mg/dL 283* 157* 121*   < > 60*    < > = values in this interval not displayed.       Estimated Creatinine Clearance: 11.6 mL/min (A) (by C-G formula based on SCr of 5.69 mg/dL (H)).    Results from last 7 days   Lab Units 05/11/23  1038   MAGNESIUM mg/dL 2.1             Results from last 7 days   Lab Units 05/18/23  0611 05/17/23  1321 05/14/23  0552 05/13/23  0616 05/12/23  0734   WBC 10*3/mm3 10.83* 11.61* 7.83 6.59 6.04   HEMOGLOBIN g/dL 12.3 12.9 11.5* 9.7* 10.3*   PLATELETS 10*3/mm3 201 217 238 208 233               Imaging Results (Last 24 Hours)     ** No results found for the last 24 hours. **           MEDICATIONS    atorvastatin, 40 mg, Oral, Daily  carvedilol, 6.25 mg, Oral, BID With Meals  cetirizine, 5 mg, Oral, Daily  clopidogrel, 75 mg, Oral, Daily  cyclobenzaprine, 5 mg, Oral, BID  docusate sodium, 100 mg, Oral, BID  DULoxetine, 20 mg, Oral, Daily  heparin (porcine), 5,000 Units, Subcutaneous, Q12H  Insulin Aspart, 0-9 Units, Subcutaneous, TID AC  insulin aspart, 14 Units, Subcutaneous, TID With Meals  insulin detemir, 24 Units, Subcutaneous, Nightly  losartan, 50 mg, Oral, Nightly  memantine, 5 mg, Oral, BID  meropenem, 500 mg, Intravenous, Q24H  nystatin, , Topical, Q12H  oxyCODONE, 10 mg, Oral, TID  pantoprazole, 40 mg, Oral, Nightly  sevelamer, 800 mg, Oral, TID With Meals  sodium chloride, 10 mL, Intravenous, Q12H      Pharmacy to Dose meropenem (MERREM),         Assessment & Plan   ASSESSMENT / PLAN      ESRD (end stage renal disease) on dialysis    1.  ESRD on  hemodialysis- normally dialyzes TTS at [facility] in [place].  Using avf now. continue HD TTS. HD today. Using 1 needle with avf and 1 needle with permcath      2.  Hyperkalemia- resolved     3.  Anemia of CKD- hgb stable     4.  CAD     5.  DM2     6.  CKD-MBD     7.  Hypertension- Gradually climbing up again on coreg and Cozaar.      8. ESBL E. Coli- On meropenem, last dose tomorrow 5/19     9. Discharge planning: Primary team attempting arrangement to LTAC             This document has been electronically signed by Edis Goldsmith MD on May 18, 2023 06:55 CDT      I have reviewed the case with the resident. I have also examined and seen  the patient.  I agree with the assessment and plan as documented in the resident's note.         This document has been electronically signed by Ace Lauren MD on May 18, 2023 09:51 CDT

## 2023-05-18 NOTE — PLAN OF CARE
Problem: Device-Related Complication Risk (Hemodialysis)  Goal: Safe, Effective Therapy Delivery  Outcome: Ongoing, Progressing     Problem: Hemodynamic Instability (Hemodialysis)  Goal: Effective Tissue Perfusion  Outcome: Ongoing, Progressing     Problem: Infection (Hemodialysis)  Goal: Absence of Infection Signs and Symptoms  Outcome: Ongoing, Progressing   Goal Outcome Evaluation:    AVF accessed without problems. After approx 30 min Art spasms noted. Transitioned to 1+1 per MD order. Removed 2500 mls. Pt tolerated tx.

## 2023-05-18 NOTE — SIGNIFICANT NOTE
05/18/23 1708   OTHER   Discipline physical therapy assistant   Rehab Time/Intention   Session Not Performed patient/family declined, not feeling well  (pt nauseated)

## 2023-05-18 NOTE — PROGRESS NOTES
[facility] Medicine Services  INPATIENT PROGRESS NOTE    Length of Stay: 4  Date of Admission: 5/11/2023  Primary Care Physician: Kiersten Wilson APRN    Subjective   Chief Complaint: Confusion  HPI:     No new complaints, feeling good.     As of today, 05/18/23  Review of Systems   Constitutional: Negative for appetite change, chills, fatigue, fever and unexpected weight change.   Respiratory: Negative for cough, choking, chest tightness, shortness of breath and wheezing.    Cardiovascular: Negative for chest pain, palpitations and leg swelling.   Gastrointestinal: Negative for abdominal pain, blood in stool, constipation, diarrhea, nausea and vomiting.   Genitourinary: Negative for dysuria, flank pain and hematuria.   Musculoskeletal: Positive for back pain and neck pain.   Neurological: Negative for dizziness, seizures, syncope, speech difficulty, weakness, light-headedness, numbness and headaches.   Hematological: Does not bruise/bleed easily.        All pertinent negatives and positives are as above. All other systems have been reviewed and are negative unless otherwise stated.    Objective    Temp:  [96.2 °F (35.7 °C)-97.2 °F (36.2 °C)] 97 °F (36.1 °C)  Heart Rate:  [66-86] 84  Resp:  [16-17] 17  BP: (133-188)/(60-91) 158/65    AM-PAC 6 Clicks Score (PT): 14 (05/18/23 0725)    As of today, 05/18/23  Physical Exam  Vitals reviewed.   Constitutional:       Appearance: She is well-developed.   HENT:      Head: Normocephalic and atraumatic.   Eyes:      Pupils: Pupils are equal, round, and reactive to light.   Cardiovascular:      Rate and Rhythm: Normal rate and regular rhythm.      Heart sounds: Normal heart sounds. No murmur heard.    No friction rub. No gallop.   Pulmonary:      Effort: Pulmonary effort is normal. No respiratory distress.      Breath sounds: Normal breath sounds. No wheezing or rales.   Chest:      Chest wall: No tenderness.    Abdominal:      General: Bowel sounds are normal. There is no distension.      Palpations: Abdomen is soft.      Tenderness: There is no abdominal tenderness. There is no guarding.   Musculoskeletal:      Cervical back: Normal range of motion and neck supple.      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   Neurological:      Comments: More oriented today.     Psychiatric:         Behavior: Behavior normal.         Thought Content: Thought content normal.           Results Review:  I have reviewed the labs, radiology results, and diagnostic studies.    Laboratory Data:   Results from last 7 days   Lab Units 05/18/23  0611 05/17/23  1321 05/14/23  0552   SODIUM mmol/L 135* 134* 133*   POTASSIUM mmol/L 4.8 4.4 4.2   CHLORIDE mmol/L 91* 91* 91*   CO2 mmol/L 24.0 25.0 25.0   BUN mg/dL 66* 51* 36*   CREATININE mg/dL 6.37* 5.69* 4.14*   GLUCOSE mg/dL 258* 283* 157*   CALCIUM mg/dL 10.1 10.2 10.1   ANION GAP mmol/L 20.0* 18.0* 17.0*     Estimated Creatinine Clearance: 10.3 mL/min (A) (by C-G formula based on SCr of 6.37 mg/dL (H)).          Results from last 7 days   Lab Units 05/18/23  0611 05/17/23  1321 05/14/23  0552 05/13/23  0616 05/12/23  0734   WBC 10*3/mm3 10.83* 11.61* 7.83 6.59 6.04   HEMOGLOBIN g/dL 12.3 12.9 11.5* 9.7* 10.3*   HEMATOCRIT % 37.4 39.7 35.9 30.1* 32.7*   PLATELETS 10*3/mm3 201 217 238 208 233           Culture Data:   No results found for: BLOODCX  No results found for: URINECX  No results found for: RESPCX  No results found for: WOUNDCX  No results found for: STOOLCX  No components found for: BODYFLD    Radiology Data:   Imaging Results (Last 24 Hours)     ** No results found for the last 24 hours. **          I have utilized all available immediate resources to obtain, update, or review the patient's current medications (including all prescriptions, over-the-counter products, herbals, cannabis/cannabidiol products, and  vitamin/mineral/dietary (nutritional) supplements).       Assessment/Plan     Active Hospital Problems    Diagnosis    • **ESRD (end stage renal disease) on dialysis        Plan:    1.  Altered mental status: Most likely multifactorial.  Likely secondary to inflammation of urinary tract infection and uremia. Appears to be at baseline.   2.  End-stage renal disease: Appreciate nephrology help.  Dialyzed Tuesday, Thursday, and Saturday.  3.  Hyperkalemia: Management with hemodialysis.  4.  Coronary artery disease: Continue home medication.  5.  Urinary tract infection: Urine culture shows ESBL E. coli.  Continue meropenem.  Last dose 5/19.  She will likely remain here until final dose of antibiotic.   6.  Diabetes mellitus: Long-acting insulin and sliding scale insulin.  7.  Morbid obesity  8.  DVT prophylaxis: Heparin.        Medical Decision Making  Number and Complexity of problems: 2 highly complex medical problems    Conditions and Status:        Condition is improving.       Discussed with: Patient     Treatment Plan  As above    Care Planning  Shared decision making: Patient in agreement with plan of care  Code status and discussions: Full code    Disposition  Social Determinants of Health that impact treatment or disposition: None    D/C Tomorrow.     I confirmed that the patient's Advance Care Plan is present, code status is documented, or surrogate decision maker is listed in the patient's medical record.        This document has been electronically signed by Cintia Simms MD on May 18, 2023 13:56 CDT

## 2023-05-19 ENCOUNTER — READMISSION MANAGEMENT (OUTPATIENT)
Dept: CALL CENTER | Facility: HOSPITAL | Age: 63
End: 2023-05-19
Payer: MEDICARE

## 2023-05-19 VITALS
DIASTOLIC BLOOD PRESSURE: 71 MMHG | RESPIRATION RATE: 18 BRPM | OXYGEN SATURATION: 100 % | HEART RATE: 71 BPM | TEMPERATURE: 97.3 F | SYSTOLIC BLOOD PRESSURE: 151 MMHG | WEIGHT: 240.1 LBS | BODY MASS INDEX: 44.18 KG/M2 | HEIGHT: 62 IN

## 2023-05-19 LAB
ANION GAP SERPL CALCULATED.3IONS-SCNC: 17 MMOL/L (ref 5–15)
BASOPHILS # BLD AUTO: 0.13 10*3/MM3 (ref 0–0.2)
BASOPHILS NFR BLD AUTO: 1.3 % (ref 0–1.5)
BUN SERPL-MCNC: 56 MG/DL (ref 8–23)
BUN/CREAT SERPL: 10.3 (ref 7–25)
CALCIUM SPEC-SCNC: 10 MG/DL (ref 8.6–10.5)
CHLORIDE SERPL-SCNC: 93 MMOL/L (ref 98–107)
CO2 SERPL-SCNC: 23 MMOL/L (ref 22–29)
CREAT SERPL-MCNC: 5.42 MG/DL (ref 0.57–1)
DEPRECATED RDW RBC AUTO: 45.1 FL (ref 37–54)
EGFRCR SERPLBLD CKD-EPI 2021: 8.3 ML/MIN/1.73
EOSINOPHIL # BLD AUTO: 0.41 10*3/MM3 (ref 0–0.4)
EOSINOPHIL NFR BLD AUTO: 4 % (ref 0.3–6.2)
ERYTHROCYTE [DISTWIDTH] IN BLOOD BY AUTOMATED COUNT: 13.9 % (ref 12.3–15.4)
GLUCOSE BLDC GLUCOMTR-MCNC: 203 MG/DL (ref 70–130)
GLUCOSE BLDC GLUCOMTR-MCNC: 293 MG/DL (ref 70–130)
GLUCOSE SERPL-MCNC: 221 MG/DL (ref 65–99)
HCT VFR BLD AUTO: 37.3 % (ref 34–46.6)
HGB BLD-MCNC: 12 G/DL (ref 12–15.9)
IMM GRANULOCYTES # BLD AUTO: 0.09 10*3/MM3 (ref 0–0.05)
IMM GRANULOCYTES NFR BLD AUTO: 0.9 % (ref 0–0.5)
LYMPHOCYTES # BLD AUTO: 3.33 10*3/MM3 (ref 0.7–3.1)
LYMPHOCYTES NFR BLD AUTO: 32.5 % (ref 19.6–45.3)
MCH RBC QN AUTO: 28.6 PG (ref 26.6–33)
MCHC RBC AUTO-ENTMCNC: 32.2 G/DL (ref 31.5–35.7)
MCV RBC AUTO: 88.8 FL (ref 79–97)
MONOCYTES # BLD AUTO: 0.91 10*3/MM3 (ref 0.1–0.9)
MONOCYTES NFR BLD AUTO: 8.9 % (ref 5–12)
NEUTROPHILS NFR BLD AUTO: 5.37 10*3/MM3 (ref 1.7–7)
NEUTROPHILS NFR BLD AUTO: 52.4 % (ref 42.7–76)
NRBC BLD AUTO-RTO: 0 /100 WBC (ref 0–0.2)
PLATELET # BLD AUTO: 179 10*3/MM3 (ref 140–450)
PMV BLD AUTO: 8.6 FL (ref 6–12)
POTASSIUM SERPL-SCNC: 5 MMOL/L (ref 3.5–5.2)
RBC # BLD AUTO: 4.2 10*6/MM3 (ref 3.77–5.28)
SODIUM SERPL-SCNC: 133 MMOL/L (ref 136–145)
WBC NRBC COR # BLD: 10.24 10*3/MM3 (ref 3.4–10.8)

## 2023-05-19 PROCEDURE — 85025 COMPLETE CBC W/AUTO DIFF WBC: CPT | Performed by: STUDENT IN AN ORGANIZED HEALTH CARE EDUCATION/TRAINING PROGRAM

## 2023-05-19 PROCEDURE — 82948 REAGENT STRIP/BLOOD GLUCOSE: CPT

## 2023-05-19 PROCEDURE — 97110 THERAPEUTIC EXERCISES: CPT

## 2023-05-19 PROCEDURE — G0378 HOSPITAL OBSERVATION PER HR: HCPCS

## 2023-05-19 PROCEDURE — 96372 THER/PROPH/DIAG INJ SC/IM: CPT

## 2023-05-19 PROCEDURE — 25010000002 HEPARIN (PORCINE) PER 1000 UNITS: Performed by: HOSPITALIST

## 2023-05-19 PROCEDURE — 97535 SELF CARE MNGMENT TRAINING: CPT

## 2023-05-19 PROCEDURE — 63710000001 INSULIN ASPART PER 5 UNITS: Performed by: HOSPITALIST

## 2023-05-19 PROCEDURE — 80048 BASIC METABOLIC PNL TOTAL CA: CPT | Performed by: STUDENT IN AN ORGANIZED HEALTH CARE EDUCATION/TRAINING PROGRAM

## 2023-05-19 RX ADMIN — HEPARIN SODIUM 5000 UNITS: 5000 INJECTION INTRAVENOUS; SUBCUTANEOUS at 08:40

## 2023-05-19 RX ADMIN — CETIRIZINE HYDROCHLORIDE 5 MG: 5 TABLET ORAL at 08:40

## 2023-05-19 RX ADMIN — INSULIN ASPART 4 UNITS: 100 INJECTION, SOLUTION INTRAVENOUS; SUBCUTANEOUS at 08:41

## 2023-05-19 RX ADMIN — DOCUSATE SODIUM 100 MG: 100 CAPSULE, LIQUID FILLED ORAL at 08:40

## 2023-05-19 RX ADMIN — DULOXETINE HYDROCHLORIDE 20 MG: 20 CAPSULE, DELAYED RELEASE ORAL at 08:40

## 2023-05-19 RX ADMIN — SEVELAMER CARBONATE 800 MG: 800 TABLET, FILM COATED ORAL at 12:50

## 2023-05-19 RX ADMIN — CLOPIDOGREL BISULFATE 75 MG: 75 TABLET ORAL at 08:40

## 2023-05-19 RX ADMIN — ATORVASTATIN CALCIUM 40 MG: 40 TABLET, FILM COATED ORAL at 08:40

## 2023-05-19 RX ADMIN — MEMANTINE 5 MG: 5 TABLET ORAL at 08:40

## 2023-05-19 RX ADMIN — CARVEDILOL 6.25 MG: 6.25 TABLET, FILM COATED ORAL at 08:40

## 2023-05-19 RX ADMIN — INSULIN ASPART 6 UNITS: 100 INJECTION, SOLUTION INTRAVENOUS; SUBCUTANEOUS at 12:50

## 2023-05-19 RX ADMIN — CYCLOBENZAPRINE 5 MG: 5 TABLET, FILM COATED ORAL at 08:40

## 2023-05-19 RX ADMIN — INSULIN ASPART 14 UNITS: 100 INJECTION, SOLUTION INTRAVENOUS; SUBCUTANEOUS at 08:40

## 2023-05-19 RX ADMIN — SEVELAMER CARBONATE 800 MG: 800 TABLET, FILM COATED ORAL at 08:40

## 2023-05-19 RX ADMIN — INSULIN ASPART 14 UNITS: 100 INJECTION, SOLUTION INTRAVENOUS; SUBCUTANEOUS at 12:50

## 2023-05-19 RX ADMIN — OXYCODONE HYDROCHLORIDE 10 MG: 5 TABLET ORAL at 08:40

## 2023-05-19 NOTE — PROGRESS NOTES
"[facility]  [address] [place] [address]. [address]  T - [phone]  F - [phone]     Progress Note          PATIENT  DEMOGRAPHICS   PATIENT NAME: Yamileth Slater                      PHYSICIAN: BRIDGETT Hadley  : 1960  MRN: [id number]   LOS: 4 days    Patient Care Team:  Kiersten Wilson APRN as PCP - General (Family Medicine)  Subjective   SUBJECTIVE   No acute events overnight       Objective   OBJECTIVE   Vital Signs  Temp:  [97 °F (36.1 °C)-98.2 °F (36.8 °C)] 97.3 °F (36.3 °C)  Heart Rate:  [67-86] 67  Resp:  [16-18] 18  BP: (119-160)/(57-91) 151/71    Flowsheet Rows    Flowsheet Row First Filed Value   Admission Height 157.5 cm (62\") Documented at 2023 1051   Admission Weight 111 kg (245 lb) Documented at 2023 1051           I/O last 3 completed shifts:  In: 720 [P.O.:720]  Out: 2800 [Urine:300; Other:2500]    PHYSICAL EXAM    Physical Exam  Constitutional:       General: She is not in acute distress.     Appearance: Normal appearance. She is obese. She is not ill-appearing or diaphoretic.   Eyes:      Conjunctiva/sclera: Conjunctivae normal.   Cardiovascular:      Rate and Rhythm: Normal rate and regular rhythm.      Pulses: Normal pulses.      Heart sounds: Normal heart sounds.   Pulmonary:      Effort: Pulmonary effort is normal.      Breath sounds: Normal breath sounds.   Musculoskeletal:      Right lower leg: No edema.      Left lower leg: No edema.   Skin:     General: Skin is warm and dry.   Neurological:      Mental Status: She is alert.         RESULTS   Results Review:    Results from last 7 days   Lab Units 23  0551 23  0611 23  1321   SODIUM mmol/L 133* 135* 134*   POTASSIUM mmol/L 5.0 4.8 4.4   CHLORIDE mmol/L 93* 91* 91*   CO2 mmol/L 23.0 24.0 25.0   BUN mg/dL 56* 66* 51*   CREATININE mg/dL 5.42* 6.37* 5.69*   CALCIUM mg/dL 10.0 10.1 10.2   GLUCOSE mg/dL 221* 258* 283*       Estimated Creatinine Clearance: 12.2 " mL/min (A) (by C-G formula based on SCr of 5.42 mg/dL (H)).                Results from last 7 days   Lab Units 05/19/23  0551 05/18/23  0611 05/17/23  1321 05/14/23  0552 05/13/23  0616   WBC 10*3/mm3 10.24 10.83* 11.61* 7.83 6.59   HEMOGLOBIN g/dL 12.0 12.3 12.9 11.5* 9.7*   PLATELETS 10*3/mm3 179 201 217 238 208               Imaging Results (Last 24 Hours)     ** No results found for the last 24 hours. **           MEDICATIONS    atorvastatin, 40 mg, Oral, Daily  carvedilol, 6.25 mg, Oral, BID With Meals  cetirizine, 5 mg, Oral, Daily  clopidogrel, 75 mg, Oral, Daily  cyclobenzaprine, 5 mg, Oral, BID  docusate sodium, 100 mg, Oral, BID  DULoxetine, 20 mg, Oral, Daily  heparin (porcine), 5,000 Units, Subcutaneous, Q12H  Insulin Aspart, 0-9 Units, Subcutaneous, TID AC  insulin aspart, 14 Units, Subcutaneous, TID With Meals  insulin detemir, 24 Units, Subcutaneous, Nightly  losartan, 50 mg, Oral, Nightly  memantine, 5 mg, Oral, BID  meropenem, 500 mg, Intravenous, Q24H  nystatin, , Topical, Q12H  oxyCODONE, 10 mg, Oral, TID  pantoprazole, 40 mg, Oral, Nightly  sevelamer, 800 mg, Oral, TID With Meals  sodium chloride, 10 mL, Intravenous, Q12H      Pharmacy to Dose meropenem (MERREM),         Assessment & Plan   ASSESSMENT / PLAN      ESRD (end stage renal disease) on dialysis    1.  ESRD on hemodialysis- normally dialyzes TTS [facility] in [place].  Using AVF now. Continue HD TTS. HD tomorrow at [facility].      2.  Hyperkalemia- resolved     3.  Anemia of CKD- hgb stable     4.  CAD     5.  DM2     6.  CKD-MBD     7.  Hypertension- Gradually climbing up again on coreg and Cozaar.      8.  ESBL E. Coli- On meropenem, last dose 5/19, refused last dose     9.  Discharge planning- Primary team attempted arrangement to LTAC, going home today           This document has been electronically signed by BRIDGETT Hadley on May 19, 2023 09:01 CDT      For this patient encounter, I have reviewed the Nurse Practitioner's  documentation, medical decision making, and treatment plan.

## 2023-05-19 NOTE — THERAPY TREATMENT NOTE
Patient Name: Yamileth Slater  : 1960    MRN: [id number]                              Today's Date: 2023       Admit Date: 2023    Visit Dx:     ICD-10-CM ICD-9-CM   1. ESRD (end stage renal disease) on dialysis  N18.6 585.6    Z99.2 V45.11   2. Hyperkalemia  E87.5 276.7   3. Urinary tract infection in female  N39.0 599.0   4. Impaired functional mobility, balance, gait, and endurance  Z74.09 V49.89   5. Impaired mobility and ADLs  Z74.09 V49.89    Z78.9      Patient Active Problem List   Diagnosis   • Chronic midline low back pain without sciatica   • Vitamin D deficiency   • Tobacco dependence syndrome   • Shoulder joint pain   • Morbid obesity with BMI of 50.0-59.9, adult   • Mixed hyperlipidemia   • Kidney stone   • History of colon polyps   • GERD (gastroesophageal reflux disease)   • Excoriated eczema   • Hypertension   • COPD (chronic obstructive pulmonary disease)   • Chronic folliculitis   • Coronary artery disease involving native coronary artery of native heart without angina pectoris   • 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   • Hypothyroidism   • Carotid artery disease   • [place] abuse   • PAD (peripheral artery disease) ([facility])   • Venous insufficiency   • LAFB (left anterior fascicular block)   • Pulmonary hypertension (HCC)   • Left hip pain   • Neck pain   • MIKE and COPD overlap syndrome   • Pneumonia due to COVID-19 virus   • Hyperkalemia   • Cutaneous candidiasis   • Community acquired pneumonia of right middle lobe of lung   • MRSA infection   • Esophageal dysphagia   • Monilial esophagitis   • NSTEMI, initial episode of care   • CAD (coronary artery disease)   • Cellulitis of foot associated with diabetes mellitus   • Urinary tract infection due to Proteus   • History of insertion of tunneled central venous catheter (CVC) with port   • Anemia due to chronic kidney disease, on chronic dialysis   • Pneumonitis   • Personal history of noncompliance with  medical treatment, presenting hazards to health   • Type 2 diabetes mellitus with circulatory disorder   • Physical deconditioning   • ESRD on hemodialysis   • DVT prophylaxis   • Anemia   • Ventilator associated pneumonia   • Positive fecal occult blood test   • Yeast UTI   • Gangrene of both feet   • Left foot pain   • 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   • On home oxygen therapy   • Subacute osteomyelitis of right foot   • Acute respiratory failure with hypoxia   • ESRD (end stage renal disease) on dialysis     Past Medical History:   Diagnosis Date   • Acute blood loss anemia 04/16/2017    Likely due to gastric oozing at this time. - Dr. Duarte (GI) was consulted and has now signed off, will follow up outpatient - pill colonoscopy showed AVMs - continue to monitor   • Acute cystitis with hematuria 03/31/2021 1/13: IV Rocephin 1 gm q 24 1/14 : transitioned  to omnicef 300mg. Urine cultures resulted and did not show growth. Omnicef discontinued as patient is asymptomatic   • Altered mental status 01/09/2022    - AMS on presentation - initial ABG pH 7.3, CO2 34 - Procal 0.29 - UA negative for acute cysitits -CTA head wnl  - Empiric Zosyn and Vancomycin -Lactate 2.5 on admission  - blood cultures no growth at 24 hours     • Anxiety    • CAD (coronary artery disease) 04/24/2021    S/P 3 stents 5/1/2021 for BHL Continue ASA 81mg & Clopidogrel 75mg Continue Atorvastatin 40mg   • Carotid artery stenosis    • CHF (congestive heart failure)    • Chronic obstructive lung disease    • CKD (chronic kidney disease) stage 4, GFR 15-29 ml/min    • CKD (chronic kidney disease), symptom management only, stage 5 10/05/2020    Results from last 7 days Lab Units 12/15/21 0548 12/14/21 1323 12/14/21 0916 CREATININE mg/dL 3.92* 3.21* 3.32*  Baseline creatinine 2-3 GFR 13-25 GFR 15 Dialysis MWF, sees Dr. Lauren Nephrology consult,, appreciate recommendations Continue Bumex 1mg bid daily Holding Bumex 2mg 4 times a week    • Colonic polyp    • Coronary arteriosclerosis    • Diabetes mellitus    • Diabetic neuropathy    • Ear pain, right 10/18/2021    - canal trauma due to patient scratching and DMT2 - added cortisporin ear drops   • Elevated troponin 10/12/2021    -most likely from CKD -Trending down -Neg chest pain   • Generalized abdominal pain 07/01/2022    Could be due to initiation of tube feeds vs dyspepsia vs abdominal cramps related to no PO intake due to intubation vs constipation Continue current laxative regiment  If no bowel movement by this afternoon will consider enema   • GERD (gastroesophageal reflux disease)    • GI bleed 05/13/2021    - GI will follow up outpatient - Protonix 40mg daily - Avoid medical DVT prophy and use mechanical at this time instead. - Continue to monitor - pill colonoscopy results showed AVMs   • History of transfusion    • Hypercholesterolemia    • Hyperosmolar hyperglycemic state (HHS) 06/25/2022    Serum glucose 605 on admission  Anion gap 16 PH 7.37 Bicarb 27.9 Continue fluids  Insulin drip with HHS protocol  Anion gap closed around 10 AM, received one dose of Levamir subq, will stop insulin drip after 2 hrs     • Hypertension    • Hypokalemia 05/27/2022    Will replace as needed. Will be cautious in the setting of ESRD to avoid need for emergency dialysis   Monitor Qtc intervals on EKG     • Hypomagnesemia 06/27/2021    Monitor and replace   • Morbid obesity    • Nephrolithiasis    • On mechanically assisted ventilation 06/26/2022    Vent management and sedation orders placed.  - [facility] intensivist group consulted for vent management appreciate recommendations  - plan to extubate today     • Peripheral vascular disease    • Pleural effusion on right 06/26/2022    CXR on 6/30/22 read as a small upper left pulmonary edema vs early pneumonia.  Last Echo 1/2022 EF 61-65 % Continue to monitor  Procal slightly improved, CRP improved On Linezolid and meropenum      • PVD (peripheral  vascular disease)    • SIRS (systemic inflammatory response syndrome) 01/09/2022    Admission  - WBC 17.78   -   - RR 16 - 1/10: VSS/wnl - CXR - Mild pulm edema - Blood cultures no growth at 24 hours  - Procalcitonin 0.29 - UA : glucose 1000, negative Leucocytes/nitrate - Empiric Zosyn and Vancomcyin    • Sleep apnea    • Substance abuse    • Vitamin D deficiency      Past Surgical History:   Procedure Laterality Date   • AMPUTATION DIGIT Right 2/27/2023    Procedure: Excision of right first metatarsal head, right second toe amputation;  Surgeon: Jean Oliveira DPM;  Location: [facility] OR;  Service: Podiatry;  Laterality: Right;   • BELOW KNEE AMPUTATION Left 12/16/2022    Procedure: AMPUTATION BELOW KNEE, LEFT;  Surgeon: Huy White MD;  Location: [facility] OR;  Service: Orthopedics;  Laterality: Left;   • CARDIAC CATHETERIZATION N/A 07/14/2020   • CARDIAC CATHETERIZATION N/A 04/23/2021    Procedure: Left Heart Cath;  Surgeon: Melba Romo MD;  Location: [facility] CATH INVASIVE LOCATION;  Service: Cardiology;  Laterality: N/A;   • CARDIAC CATHETERIZATION N/A 04/30/2021    Procedure: Percutaneous Coronary Intervention;  Surgeon: Russell oVss MD;  Location: [facility] CATH INVASIVE LOCATION;  Service: Cardiovascular;  Laterality: N/A;   • CARDIAC CATHETERIZATION N/A 04/30/2021    Procedure: Stent NIKKI coronary;  Surgeon: Russell Voss MD;  Location: [facility] CATH INVASIVE LOCATION;  Service: Cardiovascular;  Laterality: N/A;   • CARDIAC CATHETERIZATION Left 11/13/2021    Procedure: Left Heart Cath;  Surgeon: Niall Rios MD;  Location: [facility] CATH INVASIVE LOCATION;  Service: Cardiology;  Laterality: Left;   • CAROTID STENT Left    • COLONOSCOPY     • COLONOSCOPY N/A 05/14/2021    Procedure: COLONOSCOPY;  Surgeon: Mingo Duarte MD;  Location: [facility] ENDOSCOPY;  Service: Gastroenterology;  Laterality: N/A;   • CORONARY ARTERY BYPASS GRAFT N/A 2013    CABG X 3   • CYSTOSCOPY  BLADDER STONE LITHOTRIPSY Bilateral    • ENDOSCOPY N/A 04/12/2021    Procedure: ESOPHAGOGASTRODUODENOSCOPY;  Surgeon: Mingo Duarte MD;  Location: [facility] ENDOSCOPY;  Service: Gastroenterology;  Laterality: N/A;   • ENDOSCOPY N/A 05/14/2021    Procedure: ESOPHAGOGASTRODUODENOSCOPY;  Surgeon: Mingo Duarte MD;  Location: [facility] ENDOSCOPY;  Service: Gastroenterology;  Laterality: N/A;   • ENDOSCOPY N/A 01/28/2022    Procedure: ESOPHAGOGASTRODUODENOSCOPY;  Surgeon: Mingo Duarte MD;  Location: [facility] ENDOSCOPY;  Service: Gastroenterology;  Laterality: N/A;   • HYSTERECTOMY     • INTERVENTIONAL RADIOLOGY PROCEDURE N/A 10/21/2021    Procedure: tunneled central venous catheter placement;  Surgeon: Donnie Robles MD;  Location: [facility] ANGIO INVASIVE LOCATION;  Service: Interventional Radiology;  Laterality: N/A;   • INTERVENTIONAL RADIOLOGY PROCEDURE N/A 01/27/2022    Procedure: tunneled central venous catheter placement;  Surgeon: Donnie Robles MD;  Location: [facility]GIO INVASIVE LOCATION;  Service: Interventional Radiology;  Laterality: N/A;   • LAPAROSCOPIC TUBAL LIGATION     • TUNNELED VENOUS CATHETER PLACEMENT        General Information     Row Name 05/19/23 0952          OT Time and Intention    Document Type therapy note (daily note)  -KD     Mode of Treatment individual therapy;occupational therapy  -KD     Row Name 05/19/23 0952          General Information    Patient Profile Reviewed yes  -KD     Existing Precautions/Restrictions fall  -KD     Row Name 05/19/23 0952          Cognition    Orientation Status (Cognition) oriented x 4  -KD     Row Name 05/19/23 0952          Safety Issues, Functional Mobility    Impairments Affecting Function (Mobility) balance;endurance/activity tolerance;strength  -KD           User Key  (r) = Recorded By, (t) = Taken By, (c) = Cosigned By    Initials Name Provider Type    Rosanna Varner COTA Occupational Therapist Assistant                  Mobility/ADL's     Row Name 05/19/23 0952          Bed Mobility    Supine-Sit [place] (Bed Mobility) supervision  -KD     Sit-Supine [place] (Bed Mobility) supervision  -KD     Assistive Device (Bed Mobility) bed rails;head of bed elevated  -KD     Row Name 05/19/23 0952          Bed-Chair Transfer    Bed-Chair [place] (Transfers) contact guard  -KD     Assistive Device (Bed-Chair Transfers) walker, front-wheeled  -KD     Comment, (Bed-Chair Transfer) Pt t/f'd from bed>W/C- CGA  -KD     Row Name 05/19/23 0952          Functional Mobility    Functional Mobility- Ind. Level contact guard assist  -KD     Functional Mobility- Device walker, front-wheeled  -KD     Functional Mobility-Distance (Feet) 3  -KD     Row Name 05/19/23 0952          Activities of Daily Living    BADL Assessment/Intervention upper body dressing;lower body dressing;grooming  -KD     Row Name 05/19/23 0952          Mobility    Extremity Weight-bearing Status right lower extremity  -KD     Right Lower Extremity (Weight-bearing Status) partial weight-bearing (PWB)  -KD     Row Name 05/19/23 0952          Lower Body Dressing Assessment/Training    [place] Level (Lower Body Dressing) lower body dressing skills;doff;don;socks;shoes/slippers;pants/bottoms;moderate assist (50% patient effort)  Pt required Max A to don prosthesis  -KD     Position (Lower Body Dressing) edge of bed sitting  -KD     Row Name 05/19/23 0952          Upper Body Dressing Assessment/Training    [place] Level (Upper Body Dressing) upper body dressing skills;doff;don;standby assist  -KD     Position (Upper Body Dressing) edge of bed sitting  -KD     Row Name 05/19/23 0952          Grooming Assessment/Training    [place] Level (Grooming) grooming skills;hair care, combing/brushing;oral care regimen;wash face, hands;set up  -KD     Position (Grooming) edge of bed sitting  -KD           User Key  (r) = Recorded By, (t) = Taken By, (c) = Cosigned By     Initials Name Provider Type    Rosanna Varner COTA Occupational Therapist Assistant               Obj/Interventions     Row Name 05/19/23 0952          Shoulder (Therapeutic Exercise)    Shoulder AROM (Therapeutic Exercise) bilateral;flexion;extension;aBduction;aDduction;external rotation;internal rotation  Pt given handouts and on HEP and Home Safety. Pt completed HEP and verbalized understanding on each.  -     Row Name 05/19/23 0952          Elbow/Forearm (Therapeutic Exercise)    Elbow/Forearm (Therapeutic Exercise) AROM (active range of motion)  -KD     Elbow/Forearm AROM (Therapeutic Exercise) bilateral;flexion;extension;supination;pronation  -     Row Name 05/19/23 0952          Wrist (Therapeutic Exercise)    Wrist (Therapeutic Exercise) AROM (active range of motion)  -     Wrist AROM (Therapeutic Exercise) bilateral;flexion;extension  -     Row Name 05/19/23 0952          Hand (Therapeutic Exercise)    Hand (Therapeutic Exercise) AROM (active range of motion)  -     Hand AROM/AAROM (Therapeutic Exercise) bilateral;finger flexion;finger extension  -     Row Name 05/19/23 0952          Motor Skills    Therapeutic Exercise shoulder;elbow/forearm;wrist;hand  -KD           User Key  (r) = Recorded By, (t) = Taken By, (c) = Cosigned By    Initials Name Provider Type    Rosanna Varner COTA Occupational Therapist Assistant               Goals/Plan    No documentation.                Clinical Impression    No documentation.                Outcome Measures     Row Name 05/19/23 0952          How much help from another is currently needed...    Putting on and taking off regular lower body clothing? 2  -KD     Bathing (including washing, rinsing, and drying) 2  -KD     Toileting (which includes using toilet bed pan or urinal) 2  -KD     Putting on and taking off regular upper body clothing 3  -KD     Taking care of personal grooming (such as brushing teeth) 4  -KD     Eating meals 4  -KD      AM-PAC 6 Clicks Score (OT) 17  -KD     Row Name 05/19/23 0840          How much help from another person do you currently need...    Turning from your back to your side while in flat bed without using bedrails? 3  -SW     Moving from lying on back to sitting on the side of a flat bed without bedrails? 3  -SW     Moving to and from a bed to a chair (including a wheelchair)? 3  -SW     Standing up from a chair using your arms (e.g., wheelchair, bedside chair)? 3  -SW     Climbing 3-5 steps with a railing? 1  -SW     To walk in hospital room? 1  -SW     AM-PAC 6 Clicks Score (PT) 14  -SW     Highest level of mobility 4 --> Transferred to chair/commode  -SW           User Key  (r) = Recorded By, (t) = Taken By, (c) = Cosigned By    Initials Name Provider Type    Rosanna Varner COTA Occupational Therapist Assistant    Becky Little, RN Registered Nurse                Occupational Therapy Education     Title: PT OT SLP Therapies (Resolved)     Topic: Occupational Therapy (Resolved)     Point: ADL training (Resolved)     Description:   Instruct learner(s) on proper safety adaptation and remediation techniques during self care or transfers.   Instruct in proper use of assistive devices.              Learning Progress Summary           Patient Acceptance, E,TB, VU by  at 5/17/2023 1413    Comment: OT POC, Role of OT, transfer training                   Point: Home exercise program (Resolved)     Description:   Instruct learner(s) on appropriate technique for monitoring, assisting and/or progressing therapeutic exercises/activities.              Learner Progress:  Not documented in this visit.          Point: Precautions (Resolved)     Description:   Instruct learner(s) on prescribed precautions during self-care and functional transfers.              Learning Progress Summary           Patient Acceptance, E,TB, VU by  at 5/17/2023 1413    Comment: OT POC, Role of OT, transfer training                   Point:  Body mechanics (Resolved)     Description:   Instruct learner(s) on proper positioning and spine alignment during self-care, functional mobility activities and/or exercises.              Learning Progress Summary           Patient Acceptance, E,TB, VU by  at 5/17/2023 1413    Comment: OT POC, Role of OT, transfer training                               User Key     Initials Effective Dates Name Provider Type Discipline     08/11/22 -  Deanna Quiles OT Occupational Therapist OT              OT Recommendation and Plan  Therapy Frequency (OT): other (see comments) (5-7 d/wk)  Plan of Care Review  Plan of Care Reviewed With: patient  Outcome Evaluation: OT tx completed this date. Sup-sit-SBA. Pt SBA w/ set up to doff and don HG. Pt sat EOB and completed BUE ther ex w/ 2lb HW and good tolerance. Pt  became nauseated at end of tx. Pt sister present in room. Pt w/ all needs met upon exit. Cont OT POC.     Time Calculation:    Time Calculation- OT     Row Name 05/19/23 0952             Time Calculation- OT    OT Start Time 0952  -KD      OT Stop Time 1100  -KD      OT Time Calculation (min) 68 min  -KD      Total Timed Code Minutes- OT 68 minute(s)  -KD      OT Received On 05/19/23  -KD         Timed Charges    74085 - OT Therapeutic Exercise Minutes 30  -KD      96272 - OT Self Care/Mgmt Minutes 38  -KD         Total Minutes    Timed Charges Total Minutes 68  -KD       Total Minutes 68  -KD            User Key  (r) = Recorded By, (t) = Taken By, (c) = Cosigned By    Initials Name Provider Type     Rosanna Mullen COTA Occupational Therapist Assistant              Therapy Charges for Today     Code Description Service Date Service Provider Modifiers Qty    18490212230 HC OT THER PROC EA 15 MIN 5/18/2023 Rosanna Mullen COTA GO 2    18431987196 HC OT THER PROC EA 15 MIN 5/19/2023 Rosanna Mullen COTA GO 2    99913577355 HC OT SELF CARE/MGMT/TRAIN EA 15 MIN 5/19/2023 Rosanna Mullen COTA GO 3               Rosanna FLORES  ISHAN Mullen  5/19/2023

## 2023-05-19 NOTE — SIGNIFICANT NOTE
05/19/23 1345   OTHER   Discipline physical therapy assistant   Rehab Time/Intention   Session Not Performed other (see comments)  (preparing to ex with pt and cna arrived to say pacs was on the way up)

## 2023-05-19 NOTE — DISCHARGE SUMMARY
[facility] Medicine Services    DISCHARGE SUMMARY       Date of Admission: 5/11/2023  Date of Discharge:  5/19/2023  Primary Care Physician: Kiersten Wilson APRN    Presenting Problem/History of Present Illness:  Hyperkalemia [E87.5]  ESRD (end stage renal disease) on dialysis [N18.6, Z99.2]  Urinary tract infection in female [N39.0]       Final Discharge Diagnoses:  Active Hospital Problems    Diagnosis    • **ESRD (end stage renal disease) on dialysis        Consults:   Consults     Date and Time Order Name Status Description    5/11/2023 11:53 AM Nephrology - JOHN (on-call MD from group unless specified) Completed           Procedures Performed:                 Pertinent Test Results:   Imaging Results (All)     Procedure Component Value Units Date/Time    CT Head Without Contrast [[id number]] Collected: 05/11/23 1316     Updated: 05/11/23 1429    Narrative:      INDICATION:  Altered mental status.    COMPARISON:  05/25/2022.    TECHNIQUE:  Axial images were performed from the calvarium through the base of the skull  followed by 2D multiplanar reformats.    FINDINGS:  There is no mass, mass effect or midline shift.  The ventricles are midline and  symmetric.  There is no abnormal extra-axial fluid collection.  Mild small  vessel ischemic changes.   Pituitary and posterior fossa are unremarkable.  Calvarium is intact.      Impression:      No acute intracranial process.    XR Chest 1 View [[id number]] Collected: 05/11/23 1047     Updated: 05/11/23 1100    Narrative:      FINDINGS:  Right-sided PermCath is in place. Patient post median sternotomy. The heart is  enlarged. There is mild central vascular prominence. There are low lung volumes.  No focal pulmonary consolidations or pleural effusions.         Lab Results (last 48 hours)     Procedure Component Value Units Date/Time    POC Glucose Once [[id number]]  (Abnormal) Collected: 05/19/23 0632    Specimen:  Blood Updated: 05/19/23 0646     Glucose 203 mg/dL      Comment: RN NotifiedOperator: [id number] TRACE Cruz ID: [id number]       Basic Metabolic Panel [[id number]]  (Abnormal) Collected: 05/19/23 0551    Specimen: Blood Updated: 05/19/23 0638     Glucose 221 mg/dL      BUN 56 mg/dL      Creatinine 5.42 mg/dL      Sodium 133 mmol/L      Potassium 5.0 mmol/L      Chloride 93 mmol/L      CO2 23.0 mmol/L      Calcium 10.0 mg/dL      BUN/Creatinine Ratio 10.3     Anion Gap 17.0 mmol/L      eGFR 8.3 mL/min/1.73      Comment: <15 Indicative of kidney failure       Narrative:      GFR Normal >60  Chronic Kidney Disease <60  Kidney Failure <15      CBC & Differential [[id number]]  (Abnormal) Collected: 05/19/23 0551    Specimen: Blood Updated: 05/19/23 0627    Narrative:      The following orders were created for panel order CBC & Differential.  Procedure                               Abnormality         Status                     ---------                               -----------         ------                     CBC Auto Differential[[id number]]        Abnormal            Final result                 Please view results for these tests on the individual orders.    CBC Auto Differential [[id number]]  (Abnormal) Collected: 05/19/23 0551    Specimen: Blood Updated: 05/19/23 0627     WBC 10.24 10*3/mm3      RBC 4.20 10*6/mm3      Hemoglobin 12.0 g/dL      Hematocrit 37.3 %      MCV 88.8 fL      MCH 28.6 pg      MCHC 32.2 g/dL      RDW 13.9 %      RDW-SD 45.1 fl      MPV 8.6 fL      Platelets 179 10*3/mm3      Neutrophil % 52.4 %      Lymphocyte % 32.5 %      Monocyte % 8.9 %      Eosinophil % 4.0 %      Basophil % 1.3 %      Immature Grans % 0.9 %      Neutrophils, Absolute 5.37 10*3/mm3      Lymphocytes, Absolute 3.33 10*3/mm3      Monocytes, Absolute 0.91 10*3/mm3      Eosinophils, Absolute 0.41 10*3/mm3      Basophils, Absolute 0.13 10*3/mm3      Immature Grans, Absolute 0.09 10*3/mm3      nRBC 0.0 /100 WBC     POC  Glucose Once [[id number]]  (Abnormal) Collected: 05/18/23 2028    Specimen: Blood Updated: 05/18/23 2051     Glucose 212 mg/dL      Comment: RN NotifiedOperator: [id number] MARIO JAINMeter ID: [id number]       POC Glucose Once [[id number]]  (Abnormal) Collected: 05/18/23 1603    Specimen: Blood Updated: 05/18/23 1630     Glucose 274 mg/dL      Comment: RN NotifiedOperator: [id number] JULIANE Roque ID: [id number]       POC Glucose Once [[id number]]  (Abnormal) Collected: 05/18/23 1011    Specimen: Blood Updated: 05/18/23 1036     Glucose 278 mg/dL      Comment: RN NotifiedOperator: [id number] JULIANE Roque ID: [id number]       POC Glucose Once [[id number]]  (Abnormal) Collected: 05/18/23 0638    Specimen: Blood Updated: 05/18/23 0718     Glucose 263 mg/dL      Comment: RN NotifiedOperator: [id number] MARIO JAINMeter ID: [id number]       Basic Metabolic Panel [[id number]]  (Abnormal) Collected: 05/18/23 0611    Specimen: Blood Updated: 05/18/23 0714     Glucose 258 mg/dL      BUN 66 mg/dL      Creatinine 6.37 mg/dL      Sodium 135 mmol/L      Potassium 4.8 mmol/L      Chloride 91 mmol/L      CO2 24.0 mmol/L      Calcium 10.1 mg/dL      BUN/Creatinine Ratio 10.4     Anion Gap 20.0 mmol/L      eGFR 6.8 mL/min/1.73      Comment: <15 Indicative of kidney failure       Narrative:      GFR Normal >60  Chronic Kidney Disease <60  Kidney Failure <15      CBC & Differential [[id number]]  (Abnormal) Collected: 05/18/23 0611    Specimen: Blood Updated: 05/18/23 0640    Narrative:      The following orders were created for panel order CBC & Differential.  Procedure                               Abnormality         Status                     ---------                               -----------         ------                     CBC Auto Differential[[id number]]        Abnormal            Final result                 Please view results for these tests on the individual orders.    CBC Auto Differential [[id number]]   (Abnormal) Collected: 05/18/23 0611    Specimen: Blood Updated: 05/18/23 0640     WBC 10.83 10*3/mm3      RBC 4.23 10*6/mm3      Hemoglobin 12.3 g/dL      Hematocrit 37.4 %      MCV 88.4 fL      MCH 29.1 pg      MCHC 32.9 g/dL      RDW 14.3 %      RDW-SD 46.1 fl      MPV 8.6 fL      Platelets 201 10*3/mm3      Neutrophil % 58.1 %      Lymphocyte % 29.0 %      Monocyte % 7.5 %      Eosinophil % 3.6 %      Basophil % 1.0 %      Immature Grans % 0.8 %      Neutrophils, Absolute 6.29 10*3/mm3      Lymphocytes, Absolute 3.14 10*3/mm3      Monocytes, Absolute 0.81 10*3/mm3      Eosinophils, Absolute 0.39 10*3/mm3      Basophils, Absolute 0.11 10*3/mm3      Immature Grans, Absolute 0.09 10*3/mm3      nRBC 0.0 /100 WBC     POC Glucose Once [[id number]]  (Abnormal) Collected: 05/17/23 2114    Specimen: Blood Updated: 05/17/23 2216     Glucose 305 mg/dL      Comment: RN NotifiedOperator: [id number] [facility] ID: [id number]       POC Glucose Once [[id number]]  (Abnormal) Collected: 05/17/23 1611    Specimen: Blood Updated: 05/17/23 1627     Glucose 283 mg/dL      Comment: RN NotifiedOperator: [id number] TIFFMUSA TEMPLEAlexandria ID: [id number]       Basic Metabolic Panel [[id number]]  (Abnormal) Collected: 05/17/23 1321    Specimen: Blood Updated: 05/17/23 1350     Glucose 283 mg/dL      BUN 51 mg/dL      Creatinine 5.69 mg/dL      Sodium 134 mmol/L      Potassium 4.4 mmol/L      Chloride 91 mmol/L      CO2 25.0 mmol/L      Calcium 10.2 mg/dL      BUN/Creatinine Ratio 9.0     Anion Gap 18.0 mmol/L      eGFR 7.8 mL/min/1.73      Comment: <15 Indicative of kidney failure       Narrative:      GFR Normal >60  Chronic Kidney Disease <60  Kidney Failure <15      CBC & Differential [[id number]]  (Abnormal) Collected: 05/17/23 1321    Specimen: Blood Updated: 05/17/23 5495    Narrative:      The following orders were created for panel order CBC & Differential.  Procedure                               Abnormality         Status                      ---------                               -----------         ------                     CBC Auto Differential[[id number]]        Abnormal            Final result                 Please view results for these tests on the individual orders.    CBC Auto Differential [[id number]]  (Abnormal) Collected: 05/17/23 1321    Specimen: Blood Updated: 05/17/23 1330     WBC 11.61 10*3/mm3      RBC 4.53 10*6/mm3      Hemoglobin 12.9 g/dL      Hematocrit 39.7 %      MCV 87.6 fL      MCH 28.5 pg      MCHC 32.5 g/dL      RDW 14.5 %      RDW-SD 46.5 fl      MPV 8.1 fL      Platelets 217 10*3/mm3      Neutrophil % 62.4 %      Lymphocyte % 26.0 %      Monocyte % 7.4 %      Eosinophil % 2.2 %      Basophil % 0.9 %      Immature Grans % 1.1 %      Neutrophils, Absolute 7.24 10*3/mm3      Lymphocytes, Absolute 3.02 10*3/mm3      Monocytes, Absolute 0.86 10*3/mm3      Eosinophils, Absolute 0.25 10*3/mm3      Basophils, Absolute 0.11 10*3/mm3      Immature Grans, Absolute 0.13 10*3/mm3      nRBC 0.0 /100 WBC     POC Glucose Once [[id number]]  (Abnormal) Collected: 05/17/23 1010    Specimen: Blood Updated: 05/17/23 1040     Glucose 347 mg/dL      Comment: RN NotifiedOperator: [id number] ARMANDO Beauchamp ID: [id number]               Chief Complaint on Day of Discharge: UTI     Hospital Course:    Please see admission history and physical for patient's initial presentation. During this hospitalization, the following problems were addressed...     1.  Altered mental status: Most likely multifactorial.  Likely secondary to inflammation of urinary tract infection and uremia. Resolved. At baseline.     2.  End-stage renal disease: Appreciate nephrology help.  Dialyzed Tuesday, Thursday, and Saturday.    3.  Hyperkalemia: Management with hemodialysis.    4.  Coronary artery disease: Continue home medication.    5.  Urinary tract infection: Urine culture shows ESBL E. coli.  Continue meropenem.  Last dose 5/19.  She refused  "her last dose of antibiotics. She had 6 of 7 doses.    I recommended follow up with PCP. Return if symptoms persist or worsen.           Condition on Discharge:  stable    Physical Exam on Discharge:  /71 (BP Location: Left arm, Patient Position: Lying)   Pulse 67   Temp 97.3 °F (36.3 °C) (Infrared)   Resp 18   Ht 157.5 cm (62\")   Wt 109 kg (240 lb 1.6 oz)   LMP  (LMP Unknown)   SpO2 100%   BMI 43.91 kg/m²   Physical Exam  Vitals reviewed.   Constitutional:       General: She is not in acute distress.     Appearance: She is well-developed.   HENT:      Head: Normocephalic and atraumatic.      Nose: Nose normal.   Eyes:      Conjunctiva/sclera: Conjunctivae normal.   Cardiovascular:      Rate and Rhythm: Normal rate and regular rhythm.   Pulmonary:      Effort: Pulmonary effort is normal. No respiratory distress.      Breath sounds: Normal breath sounds. No wheezing or rales.   Musculoskeletal:         General: Normal range of motion.      Cervical back: Normal range of motion and neck supple.   Skin:     General: Skin is warm and dry.   Neurological:      Mental Status: She is alert and oriented to person, place, and time.   Psychiatric:         Behavior: Behavior normal.         Discharge Disposition:  Home or Self Care    Discharge Medications:     Discharge Medications      Continue These Medications      Instructions Start Date   acetaminophen 650 MG 8 hr tablet  Commonly known as: Tylenol 8 Hour   650 mg, Oral, Every 8 Hours PRN      albuterol sulfate  (90 Base) MCG/ACT inhaler  Commonly known as: PROVENTIL HFA;VENTOLIN HFA;PROAIR HFA   2 puffs, Inhalation, Every 4 Hours PRN      albuterol (2.5 MG/3ML) 0.083% nebulizer solution  Commonly known as: PROVENTIL   Inhale the contents of 1 vial by nebulization Every 4 (Four) Hours As Needed for Wheezing.      atorvastatin 40 MG tablet  Commonly known as: LIPITOR   40 mg, Oral, Daily      carvedilol 6.25 MG tablet  Commonly known as: COREG   6.25 " "mg, Oral, 2 Times Daily With Meals      Cetirizine HCl 10 MG capsule   1 capsule, Oral, Daily      clopidogrel 75 MG tablet  Commonly known as: PLAVIX   75 mg, Oral, Daily      [employer] Blood Glucose Meter w/Device kit   1 each, Does not apply, 3 Times Daily      cyclobenzaprine 5 MG tablet  Commonly known as: FLEXERIL   5 mg, Oral, 2 Times Daily      Diclofenac Sodium 1 % gel gel  Commonly known as: VOLTAREN   4 g, Topical, 4 Times Daily PRN      docusate sodium 100 MG capsule  Commonly known as: COLACE   100 mg, Oral, 2 Times Daily      DULoxetine 20 MG capsule  Commonly known as: CYMBALTA   20 mg, Oral, Daily      Easy Touch Insulin Syringe 30G X 5/16\" 0.5 ML misc  Generic drug: Insulin Syringe-Needle U-100   USE AS DIRECTED WITH LEVEMIR      Easy Touch Pen Needles 31G X 8 MM misc  Generic drug: Insulin Pen Needle   No dose, route, or frequency recorded.      NovoFine 30G X 8 MM misc  Generic drug: Insulin Pen Needle   As directed 4 times daily      B-D ULTRAFINE III SHORT PEN 31G X 8 MM misc  Generic drug: Insulin Pen Needle   Use to inject insulin 4 (Four) Times a Day as directed.      furosemide 20 MG tablet  Commonly known as: LASIX   20 mg, Oral, Daily      gabapentin 300 MG capsule  Commonly known as: NEURONTIN   300 mg, Oral, Daily      hydrOXYzine 25 MG tablet  Commonly known as: ATARAX   25 mg, Oral, Every 8 Hours PRN      insulin detemir 100 UNIT/ML injection  Commonly known as: LEVEMIR   24 Units, Subcutaneous, 2 Times Daily - Glucommander, pt takes in the am and pm      Insulin Lispro (1 Unit Dial) 100 UNIT/ML solution pen-injector  Commonly known as: HUMALOG   14-35 Units, Subcutaneous, 3 Times Daily With Meals, Takes 14 units with meals plus sliding scale Sliding scale:  150-199 take 4 units 200-249 take 8 units 250-299 take 12 units 300-349 take 16 units 350-400 take 20 units greater than 400, call physician      ipratropium-albuterol 0.5-2.5 mg/3 ml nebulizer  Commonly known as: DUO-NEB   3 mL, " Nebulization, 4 Times Daily PRN      losartan 25 MG tablet  Commonly known as: COZAAR   25 mg, Oral, Daily      memantine 5 MG tablet  Commonly known as: NAMENDA   5 mg, Oral, 2 Times Daily      nitroglycerin 0.4 MG SL tablet  Commonly known as: NITROSTAT   0.4 mg, Sublingual, Every 5 Minutes PRN      O2  Commonly known as: OXYGEN   2 L/min, Inhalation, Continuous      ondansetron 4 MG tablet  Commonly known as: ZOFRAN   4 mg, Oral, Every 6 Hours PRN      oxyCODONE 10 MG tablet  Commonly known as: ROXICODONE   10 mg, Oral, 3 Times Daily      pantoprazole 40 MG EC tablet  Commonly known as: PROTONIX   40 mg, Oral, Nightly      promethazine 25 MG tablet  Commonly known as: PHENERGAN   25 mg, Oral, Every 6 Hours PRN      sevelamer 800 MG tablet  Commonly known as: RENVELA   800 mg, Oral, 3 Times Daily With Meals      Vitamin D (Ergocalciferol) 91110 units capsule   50,000 Units, Oral, Weekly, Take on Wednesday             I have utilized all available immediate resources to obtain, update, or review the patient's current medications (including all prescriptions, over-the-counter products, herbals, cannabis/cannabidiol products, and vitamin/mineral/dietary (nutritional) supplements).       Discharge Diet:   Diet Instructions     Diet: Renal Diets; Low Sodium (2-3g); Regular Texture (IDDSI 7); Thin (IDDSI 0)      Discharge Diet: Renal Diets    Renal Diet: Low Sodium (2-3g)    Texture: Regular Texture (IDDSI 7)    Fluid Consistency: Thin (IDDSI 0)          Activity at Discharge:   Activity Instructions     Activity as Tolerated            Follow-up Appointments:   Future Appointments   Date Time Provider Department Center   5/24/2023  5:15 AM Gill Lim APRN [facility]   5/24/2023  1:00 PM Gill Lim APRN  [facility]   6/16/2023  9:50 AM Huy White MD [facility]       Test Results Pending at Discharge:     Time: >30 minutes          This document has been electronically signed by  Cintia Simms MD on May 19, 2023 09:29 CDT

## 2023-05-19 NOTE — PLAN OF CARE
Problem: Device-Related Complication Risk (Hemodialysis)  Goal: Safe, Effective Therapy Delivery  5/19/2023 0537 by Keena Mendez RN  Outcome: Ongoing, Progressing  5/18/2023 2243 by Keena Mendez RN  Outcome: Ongoing, Progressing     Problem: Hemodynamic Instability (Hemodialysis)  Goal: Effective Tissue Perfusion  5/19/2023 0537 by Keena Mendez RN  Outcome: Ongoing, Progressing  5/18/2023 2243 by Keena Mendez RN  Outcome: Ongoing, Progressing     Problem: Infection (Hemodialysis)  Goal: Absence of Infection Signs and Symptoms  5/19/2023 0537 by Keena Mendez RN  Outcome: Ongoing, Progressing  5/18/2023 2243 by Keena Mendez RN  Outcome: Ongoing, Progressing   Goal Outcome Evaluation:  Plan of Care Reviewed With: patient        Progress: no change

## 2023-05-19 NOTE — PROGRESS NOTES
[facility]  INPATIENT WOUND & OSTOMY CARE    PROGRESS NOTE    Today's Date: 05/19/23    Patient Name: Yamileth Slater  MRN: [id number]  CSN: [id number]  PCP: Kiersten Wilson APRN  Referring Provider: Jef Ramires MD  Attending Provider: Cintia Simms MD  Length of Stay: 4    SUBJECTIVE   Chief Complaint: left foot wound    HPI: Follow up today. Due for woundvac change. Patient pulled out IV yesterday and refused to be restuck. Had one more dose of antibiotics. Patient to be discharged home today.      Visit Dx:    ICD-10-CM ICD-9-CM   1. ESRD (end stage renal disease) on dialysis  N18.6 585.6    Z99.2 V45.11   2. Hyperkalemia  E87.5 276.7   3. Urinary tract infection in female  N39.0 599.0   4. Impaired functional mobility, balance, gait, and endurance  Z74.09 V49.89   5. Impaired mobility and ADLs  Z74.09 V49.89    Z78.9        Hospital Problem List:     ESRD (end stage renal disease) on dialysis      History:   Past Medical History:   Diagnosis Date   • Acute blood loss anemia 04/16/2017    Likely due to gastric oozing at this time. - Dr. Duarte (GI) was consulted and has now signed off, will follow up outpatient - pill colonoscopy showed AVMs - continue to monitor   • Acute cystitis with hematuria 03/31/2021 1/13: IV Rocephin 1 gm q 24 1/14 : transitioned  to omnicef 300mg. Urine cultures resulted and did not show growth. Omnicef discontinued as patient is asymptomatic   • Altered mental status 01/09/2022    - AMS on presentation - initial ABG pH 7.3, CO2 34 - Procal 0.29 - UA negative for acute cysitits -CTA head wnl  - Empiric Zosyn and Vancomycin -Lactate 2.5 on admission  - blood cultures no growth at 24 hours     • Anxiety    • CAD (coronary artery disease) 04/24/2021    S/P 3 stents 5/1/2021 for BHL Continue ASA 81mg & Clopidogrel 75mg Continue Atorvastatin 40mg   • Carotid artery stenosis    • CHF (congestive heart failure)    • Chronic obstructive lung  disease    • CKD (chronic kidney disease) stage 4, GFR 15-29 ml/min    • CKD (chronic kidney disease), symptom management only, stage 5 10/05/2020    Results from last 7 days Lab Units 12/15/21 0548 12/14/21 1323 12/14/21 0916 CREATININE mg/dL 3.92* 3.21* 3.32*  Baseline creatinine 2-3 GFR 13-25 GFR 15 Dialysis MWF, sees Dr. Lauren Nephrology consult,, appreciate recommendations Continue Bumex 1mg bid daily Holding Bumex 2mg 4 times a week   • Colonic polyp    • Coronary arteriosclerosis    • Diabetes mellitus    • Diabetic neuropathy    • Ear pain, right 10/18/2021    - canal trauma due to patient scratching and DMT2 - added cortisporin ear drops   • Elevated troponin 10/12/2021    -most likely from CKD -Trending down -Neg chest pain   • Generalized abdominal pain 07/01/2022    Could be due to initiation of tube feeds vs dyspepsia vs abdominal cramps related to no PO intake due to intubation vs constipation Continue current laxative regiment  If no bowel movement by this afternoon will consider enema   • GERD (gastroesophageal reflux disease)    • GI bleed 05/13/2021    - GI will follow up outpatient - Protonix 40mg daily - Avoid medical DVT prophy and use mechanical at this time instead. - Continue to monitor - pill colonoscopy results showed AVMs   • History of transfusion    • Hypercholesterolemia    • Hyperosmolar hyperglycemic state (HHS) 06/25/2022    Serum glucose 605 on admission  Anion gap 16 PH 7.37 Bicarb 27.9 Continue fluids  Insulin drip with HHS protocol  Anion gap closed around 10 AM, received one dose of Levamir subq, will stop insulin drip after 2 hrs     • Hypertension    • Hypokalemia 05/27/2022    Will replace as needed. Will be cautious in the setting of ESRD to avoid need for emergency dialysis   Monitor Qtc intervals on EKG     • Hypomagnesemia 06/27/2021    Monitor and replace   • Morbid obesity    • Nephrolithiasis    • On mechanically assisted ventilation 06/26/2022    Vent management and  sedation orders placed.  - [facility] intensivist group consulted for vent management appreciate recommendations  - plan to extubate today     • Peripheral vascular disease    • Pleural effusion on right 06/26/2022    CXR on 6/30/22 read as a small upper left pulmonary edema vs early pneumonia.  Last Echo 1/2022 EF 61-65 % Continue to monitor  Procal slightly improved, CRP improved On Linezolid and meropenum      • PVD (peripheral vascular disease)    • SIRS (systemic inflammatory response syndrome) 01/09/2022    Admission  - WBC 17.78   -   - RR 16 - 1/10: VSS/wnl - CXR - Mild pulm edema - Blood cultures no growth at 24 hours  - Procalcitonin 0.29 - UA : glucose 1000, negative Leucocytes/nitrate - Empiric Zosyn and Vancomcyin    • Sleep apnea    • Substance abuse    • Vitamin D deficiency      Past Surgical History:   Procedure Laterality Date   • AMPUTATION DIGIT Right 2/27/2023    Procedure: Excision of right first metatarsal head, right second toe amputation;  Surgeon: Jean Oliveira DPM;  Location: [facility];  Service: Podiatry;  Laterality: Right;   • BELOW KNEE AMPUTATION Left 12/16/2022    Procedure: AMPUTATION BELOW KNEE, LEFT;  Surgeon: Huy White MD;  Location: [facility] OR;  Service: Orthopedics;  Laterality: Left;   • CARDIAC CATHETERIZATION N/A 07/14/2020   • CARDIAC CATHETERIZATION N/A 04/23/2021    Procedure: Left Heart Cath;  Surgeon: Melba Romo MD;  Location: [facility] CATH INVASIVE LOCATION;  Service: Cardiology;  Laterality: N/A;   • CARDIAC CATHETERIZATION N/A 04/30/2021    Procedure: Percutaneous Coronary Intervention;  Surgeon: Russell Voss MD;  Location: [facility] CATH INVASIVE LOCATION;  Service: Cardiovascular;  Laterality: N/A;   • CARDIAC CATHETERIZATION N/A 04/30/2021    Procedure: Stent NIKKI coronary;  Surgeon: Russell Voss MD;  Location: [facility] CATH INVASIVE LOCATION;  Service: Cardiovascular;  Laterality: N/A;   • CARDIAC CATHETERIZATION  Left 11/13/2021    Procedure: Left Heart Cath;  Surgeon: Niall Rios MD;  Location: [facility] CATH INVASIVE LOCATION;  Service: Cardiology;  Laterality: Left;   • CAROTID STENT Left    • COLONOSCOPY     • COLONOSCOPY N/A 05/14/2021    Procedure: COLONOSCOPY;  Surgeon: Mingo Duarte MD;  Location: [facility] ENDOSCOPY;  Service: Gastroenterology;  Laterality: N/A;   • CORONARY ARTERY BYPASS GRAFT N/A 2013    CABG X 3   • CYSTOSCOPY BLADDER STONE LITHOTRIPSY Bilateral    • ENDOSCOPY N/A 04/12/2021    Procedure: ESOPHAGOGASTRODUODENOSCOPY;  Surgeon: Mingo Duarte MD;  Location: [facility] ENDOSCOPY;  Service: Gastroenterology;  Laterality: N/A;   • ENDOSCOPY N/A 05/14/2021    Procedure: ESOPHAGOGASTRODUODENOSCOPY;  Surgeon: Mingo Duarte MD;  Location: [facility] ENDOSCOPY;  Service: Gastroenterology;  Laterality: N/A;   • ENDOSCOPY N/A 01/28/2022    Procedure: ESOPHAGOGASTRODUODENOSCOPY;  Surgeon: Mingo Duarte MD;  Location: [facility] ENDOSCOPY;  Service: Gastroenterology;  Laterality: N/A;   • HYSTERECTOMY     • INTERVENTIONAL RADIOLOGY PROCEDURE N/A 10/21/2021    Procedure: tunneled central venous catheter placement;  Surgeon: Donnie Robles MD;  Location: [facility] ANGIO INVASIVE LOCATION;  Service: Interventional Radiology;  Laterality: N/A;   • INTERVENTIONAL RADIOLOGY PROCEDURE N/A 01/27/2022    Procedure: tunneled central venous catheter placement;  Surgeon: Donnie Robles MD;  Location: [facility]GIO INVASIVE LOCATION;  Service: Interventional Radiology;  Laterality: N/A;   • LAPAROSCOPIC TUBAL LIGATION     • TUNNELED VENOUS CATHETER PLACEMENT       Social History     Socioeconomic History   • Marital status: Legally      Spouse name: wesley dumont   Tobacco Use   • Smoking status: Every Day     Packs/day: 0.25     Years: 46.00     Pack years: 11.50     Types: Cigarettes     Start date: 2/1/1999   • Smokeless tobacco: Never   Vaping Use   • Vaping Use: Never used   Substance and  Sexual Activity   • Alcohol use: No   • Drug use: Not Currently     Types: LSD, Marijuana, Methamphetamines   • Sexual activity: Defer       Allergies:  Allergies   Allergen Reactions   • Adhesive Tape Hives, Other (See Comments) and Rash   • Nsaids Hives   • Latex Other (See Comments) and Hives   • Other Itching     Bandaids, MRSA, SURECLOSE   • Wound Dressing Adhesive Hives and Rash       Medications:    Current Facility-Administered Medications:   •  acetaminophen (TYLENOL) tablet 650 mg, 650 mg, Oral, Q8H PRN, Jef Ramires MD, 650 mg at 05/16/23 0754  •  albuterol (PROVENTIL) nebulizer solution 0.083% 2.5 mg/3mL, 2.5 mg, Nebulization, Q4H PRN, Jef Ramires MD  •  atorvastatin (LIPITOR) tablet 40 mg, 40 mg, Oral, Daily, Jef Ramires MD, 40 mg at 05/18/23 0749  •  carvedilol (COREG) tablet 6.25 mg, 6.25 mg, Oral, BID With Meals, Jef Ramires MD, 6.25 mg at 05/18/23 1715  •  cetirizine (zyrTEC) tablet 5 mg, 5 mg, Oral, Daily, Jef Ramires MD, 5 mg at 05/18/23 0749  •  clopidogrel (PLAVIX) tablet 75 mg, 75 mg, Oral, Daily, Jef Ramires MD, 75 mg at 05/18/23 0749  •  cyclobenzaprine (FLEXERIL) tablet 5 mg, 5 mg, Oral, BID, Jef Ramires MD, 5 mg at 05/18/23 2018  •  dextrose (D50W) (25 g/50 mL) IV injection 25 g, 25 g, Intravenous, Q15 Min PRN, Jef Ramires MD  •  dextrose (GLUTOSE) oral gel 15 g, 15 g, Oral, Q15 Min PRN, Jef Ramires MD  •  docusate sodium (COLACE) capsule 100 mg, 100 mg, Oral, BID, Jef Ramires MD, 100 mg at 05/18/23 2018  •  DULoxetine (CYMBALTA) DR capsule 20 mg, 20 mg, Oral, Daily, Jef Ramires MD, 20 mg at 05/18/23 0748  •  glucagon HCl (Diagnostic) injection 1 mg, 1 mg, Intramuscular, Q15 Min PRN, Jef Ramires MD  •  heparin (porcine) 5000 UNIT/ML injection 5,000 Units, 5,000 Units, Subcutaneous, Q12H, Jef Ramires MD, 5,000 Units at 05/18/23 2018  •  heparin (porcine) injection 4,000 Units, 4,000 Units,  Intracatheter, PRN, Ace Lauren MD, 4,000 Units at 05/18/23 0830  •  hydrOXYzine (ATARAX) tablet 25 mg, 25 mg, Oral, Q8H PRN, Jef Ramires MD, 25 mg at 05/17/23 0808  •  Insulin Aspart (novoLOG) injection 0-9 Units, 0-9 Units, Subcutaneous, TID AC, Jef Ramires MD, 6 Units at 05/18/23 1716  •  Insulin Aspart (novoLOG) injection 14 Units, 14 Units, Subcutaneous, TID With Meals, Jef Ramires MD, 14 Units at 05/18/23 1715  •  insulin detemir (LEVEMIR) injection 24 Units, 24 Units, Subcutaneous, Nightly, Jef Ramires MD, 24 Units at 05/18/23 2017  •  ipratropium-albuterol (DUO-NEB) nebulizer solution 3 mL, 3 mL, Nebulization, 4x Daily PRN, Jef Ramires MD  •  lidocaine-prilocaine (EMLA) 2.5-2.5 % cream, , Topical, PRN, Ace Lauren MD, Given at 05/18/23 0634  •  losartan (COZAAR) tablet 50 mg, 50 mg, Oral, Nightly, Ace Lauren MD, 50 mg at 05/18/23 2018  •  memantine (NAMENDA) tablet 5 mg, 5 mg, Oral, BID, Jef Ramires MD, 5 mg at 05/18/23 2018  •  meropenem (MERREM) 500mg/100 mL 0.9% NS IVPB (mbp), 500 mg, Intravenous, Q24H, Jef Ramires MD, Stopped at 05/18/23 1833  •  nitroglycerin (NITROSTAT) SL tablet 0.4 mg, 0.4 mg, Sublingual, Q5 Min PRN, Jef Ramires MD  •  nystatin (MYCOSTATIN) powder, , Topical, Q12H, Simeon Peña MD, Given at 05/18/23 2023  •  ondansetron (ZOFRAN) tablet 4 mg, 4 mg, Oral, Q6H PRN, Jef Ramires MD, 4 mg at 05/18/23 1410  •  oxyCODONE (ROXICODONE) immediate release tablet 10 mg, 10 mg, Oral, TID, Jef Ramires MD, 10 mg at 05/18/23 2018  •  pantoprazole (PROTONIX) EC tablet 40 mg, 40 mg, Oral, Nightly, Jef Ramires MD, 40 mg at 05/18/23 2018  •  Pharmacy to Dose meropenem (MERREM), , Does not apply, Continuous PRN, Jef Ramires MD  •  promethazine (PHENERGAN) tablet 25 mg, 25 mg, Oral, Q6H PRN, Jef Ramires MD, 25 mg at 05/16/23 1736  •  sevelamer (RENVELA) tablet 800 mg, 800 mg, Oral, TID With Meals,  Jef Ramires MD, 800 mg at 05/18/23 1715  •  sodium chloride 0.9 % flush 10 mL, 10 mL, Intravenous, PRN, Jef Ramires MD  •  sodium chloride 0.9 % flush 10 mL, 10 mL, Intravenous, Q12H, Jef Ramires MD, 10 mL at 05/17/23 2209  •  sodium chloride 0.9 % flush 10 mL, 10 mL, Intravenous, PRN, Jef Ramires MD  •  sodium chloride 0.9 % infusion 40 mL, 40 mL, Intravenous, PRNIke Kevin L, MD        OBJECTIVE     Vitals:    05/19/23 0734   BP: 151/71   Pulse: 67   Resp: 18   Temp: 97.3 °F (36.3 °C)   SpO2: 100%       PHYSICAL EXAM  Physical Exam  Vitals reviewed. Nursing notes reviewed.  Constitutional:       Appearance: Well-developed.     Musculoskeletal:         Cervical back: Normal range of motion and neck supple.   Skin:     General: Skin is warm and dry.      Coloration: Skin is not pale.      Findings: No erythema or rash. Wound to right great toe.  Neurological:      Mental Status: Pt is alert and oriented to person, place, and time.   Psychiatric:         Behavior: Behavior normal.         Thought Content: Thought content normal.         Judgment: Judgment normal.       Results Review:  Lab Results (last 48 hours)     Procedure Component Value Units Date/Time    POC Glucose Once [[id number]]  (Abnormal) Collected: 05/15/23 1024    Specimen: Blood Updated: 05/15/23 1045     Glucose 225 mg/dL      Comment: RN NotifiedOperator: [id number] JUAQUIN GONGORAWhite Mountain Regional Medical CenterMeter ID: [id number]       POC Glucose Once [[id number]]  (Abnormal) Collected: 05/15/23 0610    Specimen: Blood Updated: 05/15/23 0638     Glucose 192 mg/dL      Comment: RN NotifiedOperator: [id number] Colibri IOMeter ID: [id number]       POC Glucose Once [[id number]]  (Abnormal) Collected: 05/14/23 2028    Specimen: Blood Updated: 05/14/23 2055     Glucose 185 mg/dL      Comment: RN NotifiedOperator: [id number] Colibri IOMeter ID: [id number]       POC Glucose Once [[id number]]  (Abnormal) Collected: 05/14/23 1611     Specimen: Blood Updated: 05/14/23 1636     Glucose 154 mg/dL      Comment: RN NotifiedOperator: [id number] KEVIN BAHENAYMeter ID: [id number]       POC Glucose Once [[id number]]  (Abnormal) Collected: 05/14/23 1053    Specimen: Blood Updated: 05/14/23 1119     Glucose 303 mg/dL      Comment: Result Not ConfirmedOperator: [id number] KEVIN BAHENAYMeter ID: [id number]       Basic Metabolic Panel [[id number]]  (Abnormal) Collected: 05/14/23 0552    Specimen: Blood Updated: 05/14/23 0658     Glucose 157 mg/dL      BUN 36 mg/dL      Creatinine 4.14 mg/dL      Sodium 133 mmol/L      Potassium 4.2 mmol/L      Chloride 91 mmol/L      CO2 25.0 mmol/L      Calcium 10.1 mg/dL      BUN/Creatinine Ratio 8.7     Anion Gap 17.0 mmol/L      eGFR 11.5 mL/min/1.73      Comment: <15 Indicative of kidney failure       Narrative:      GFR Normal >60  Chronic Kidney Disease <60  Kidney Failure <15      POC Glucose Once [[id number]]  (Abnormal) Collected: 05/14/23 0546    Specimen: Blood Updated: 05/14/23 0649     Glucose 154 mg/dL      Comment: RN NotifiedOperator: [id number] PATRICIA Pratt ID: [id number]       CBC & Differential [[id number]]  (Abnormal) Collected: 05/14/23 0552    Specimen: Blood Updated: 05/14/23 0630    Narrative:      The following orders were created for panel order CBC & Differential.  Procedure                               Abnormality         Status                     ---------                               -----------         ------                     CBC Auto Differential[[id number]]        Abnormal            Final result                 Please view results for these tests on the individual orders.    CBC Auto Differential [[id number]]  (Abnormal) Collected: 05/14/23 0552    Specimen: Blood Updated: 05/14/23 0630     WBC 7.83 10*3/mm3      RBC 3.98 10*6/mm3      Hemoglobin 11.5 g/dL      Hematocrit 35.9 %      MCV 90.2 fL      MCH 28.9 pg      MCHC 32.0 g/dL      RDW 14.5 %      RDW-SD 47.7 fl       MPV 8.7 fL      Platelets 238 10*3/mm3      Neutrophil % 52.6 %      Lymphocyte % 30.8 %      Monocyte % 10.1 %      Eosinophil % 4.0 %      Basophil % 1.1 %      Immature Grans % 1.4 %      Neutrophils, Absolute 4.12 10*3/mm3      Lymphocytes, Absolute 2.41 10*3/mm3      Monocytes, Absolute 0.79 10*3/mm3      Eosinophils, Absolute 0.31 10*3/mm3      Basophils, Absolute 0.09 10*3/mm3      Immature Grans, Absolute 0.11 10*3/mm3      nRBC 0.0 /100 WBC     POC Glucose Once [[id number]]  (Normal) Collected: 05/13/23 2016    Specimen: Blood Updated: 05/13/23 2112     Glucose 121 mg/dL      Comment: RN NotifiedOperator: [id number] PATRICIAANGELES POLLOCKMeter ID: [id number]       POC Glucose Once [[id number]]  (Normal) Collected: 05/13/23 1623    Specimen: Blood Updated: 05/13/23 1649     Glucose 112 mg/dL      Comment: RN NotifiedOperator: [id number] KEVIN WoldmeYMeter ID: [id number]       POC Glucose Once [[id number]]  (Abnormal) Collected: 05/13/23 0558    Specimen: Blood Updated: 05/13/23 1319     Glucose 131 mg/dL      Comment: RN NotifiedOperator: [id number] PATRICIA POLLOCKMeter ID: [id number]       POC Glucose Once [[id number]]  (Abnormal) Collected: 05/13/23 1035    Specimen: Blood Updated: 05/13/23 1140     Glucose 202 mg/dL      Comment: RN NotifiedOperator: [id number] KEVIN WoldmeYMeter ID: [id number]           Imaging Results (Last 72 Hours)     ** No results found for the last 72 hours. **             ASSESSMENT/PLAN       Examination and evaluation of wound(s) was performed.    DIAGNOSIS:     Post op wound dehiscence, right foot  Diabetes Mellitus with foot ulcer    PLAN:     Woundvac changed. Black foam placed and bridged to top of foot. No bone or tendon exposure. Weight bearing to heel. Patient to be discharged home with HH today. HH to change vac Monday. Has follow up appt in wound center Wednesday. Patient switched over to home vac.    Discussed findings and treatment plan including risks, benefits,  and treatment options with patient in detail. Patient agreed with treatment plan.          This document has been electronically signed by BRIDGETT Corbett on May 19, 2023 08:17 CDT

## 2023-05-20 NOTE — OUTREACH NOTE
Prep Survey      Flowsheet Row Responses   [facility] facility patient discharged from? [place]   Is LACE score < 7 ? No   Eligibility Readm Mgmt   Discharge diagnosis ESRD (end stage renal disease) on dialysis   Does the patient have one of the following disease processes/diagnoses(primary or secondary)? Other   Does the patient have Home health ordered? No   Is there a DME ordered? No   Prep survey completed? Yes            Gemma OLVERA - Registered Nurse

## 2023-05-22 ENCOUNTER — HOME CARE VISIT (OUTPATIENT)
Dept: HOME HEALTH SERVICES | Facility: CLINIC | Age: 63
End: 2023-05-22
Payer: COMMERCIAL

## 2023-05-22 LAB
QT INTERVAL: 446 MS
QTC INTERVAL: 463 MS

## 2023-05-22 PROCEDURE — G0299 HHS/HOSPICE OF RN EA 15 MIN: HCPCS

## 2023-05-23 VITALS
TEMPERATURE: 98.6 F | OXYGEN SATURATION: 99 % | HEART RATE: 87 BPM | DIASTOLIC BLOOD PRESSURE: 78 MMHG | RESPIRATION RATE: 20 BRPM | SYSTOLIC BLOOD PRESSURE: 128 MMHG

## 2023-05-24 ENCOUNTER — OFFICE VISIT (OUTPATIENT)
Dept: WOUND CARE | Facility: HOSPITAL | Age: 63
End: 2023-05-24
Payer: MEDICARE

## 2023-05-24 ENCOUNTER — OUTSIDE FACILITY SERVICE (OUTPATIENT)
Dept: WOUND CARE | Facility: HOSPITAL | Age: 63
End: 2023-05-24

## 2023-05-24 ENCOUNTER — READMISSION MANAGEMENT (OUTPATIENT)
Dept: CALL CENTER | Facility: HOSPITAL | Age: 63
End: 2023-05-24
Payer: MEDICARE

## 2023-05-24 NOTE — OUTREACH NOTE
Medical Week 1 Survey    Flowsheet Row Responses   [facility] patient discharged from? [place]   Does the patient have one of the following disease processes/diagnoses(primary or secondary)? Other   Week 1 attempt successful? No   Unsuccessful attempts Attempt 1          Corrie LIU - Licensed Nurse

## 2023-05-26 RX ORDER — INSULIN LISPRO 100 [IU]/ML
INJECTION, SOLUTION INTRAVENOUS; SUBCUTANEOUS
Qty: 15 ML | Refills: 12 | OUTPATIENT
Start: 2023-05-26

## 2023-05-27 ENCOUNTER — HOME CARE VISIT (OUTPATIENT)
Dept: HOME HEALTH SERVICES | Facility: HOME HEALTHCARE | Age: 63
End: 2023-05-27
Payer: COMMERCIAL

## 2023-05-29 NOTE — NURSING NOTE
Care Due:                  Date            Visit Type   Department     Provider  --------------------------------------------------------------------------------                                EP -                              PRIMARY      [facility] INTERNAL  Last Visit: 03-      CARE ([facility])   RAHUL Ramírez                              EP -                              PRIMARY      [facility] INTERNAL  Next Visit: 09-      CARE ([facility])   RAHUL Ramírez                                                            Last  Test          Frequency    Reason                     Performed    Due Date  --------------------------------------------------------------------------------    HBA1C.......  6 months...  dulaglutide,               01- 07-                             empagliflozin, metFORMIN.    [facility] Embedded Care Due Messages. Reference number: [id number].   5/29/2023 9:57:27 AM CDT   Patient's fsbs 322. Spoke with Dr. Barragan order to hold previously ordered 20 units of regular insulin iv. Will check FSBS in one hour an evaluate discharge. Patient complained of headache order received for tylenol.

## 2023-05-30 RX ORDER — INSULIN LISPRO 100 [IU]/ML
INJECTION, SOLUTION INTRAVENOUS; SUBCUTANEOUS
Qty: 15 ML | Refills: 12 | OUTPATIENT
Start: 2023-05-30

## 2023-05-31 ENCOUNTER — OUTSIDE FACILITY SERVICE (OUTPATIENT)
Dept: WOUND CARE | Facility: HOSPITAL | Age: 63
End: 2023-05-31
Payer: MEDICARE

## 2023-05-31 ENCOUNTER — OFFICE VISIT (OUTPATIENT)
Dept: WOUND CARE | Facility: HOSPITAL | Age: 63
End: 2023-05-31

## 2023-06-06 ENCOUNTER — HOME CARE VISIT (OUTPATIENT)
Dept: HOME HEALTH SERVICES | Facility: HOME HEALTHCARE | Age: 63
End: 2023-06-06
Payer: COMMERCIAL

## 2023-06-06 ENCOUNTER — READMISSION MANAGEMENT (OUTPATIENT)
Dept: CALL CENTER | Facility: HOSPITAL | Age: 63
End: 2023-06-06
Payer: MEDICARE

## 2023-06-06 NOTE — OUTREACH NOTE
Medical Week 3 Survey      Flowsheet Row Responses   [facility] patient discharged from? [place]   Does the patient have one of the following disease processes/diagnoses(primary or secondary)? Other   Week 3 attempt successful? No   Unsuccessful attempts Attempt 1            Marielena Chaney Registered Nurse

## 2023-06-07 ENCOUNTER — OFFICE VISIT (OUTPATIENT)
Dept: WOUND CARE | Facility: HOSPITAL | Age: 63
End: 2023-06-07
Payer: MEDICARE

## 2023-06-07 ENCOUNTER — OUTSIDE FACILITY SERVICE (OUTPATIENT)
Dept: WOUND CARE | Facility: HOSPITAL | Age: 63
End: 2023-06-07
Payer: MEDICARE

## 2023-06-09 ENCOUNTER — HOME CARE VISIT (OUTPATIENT)
Dept: HOME HEALTH SERVICES | Facility: CLINIC | Age: 63
End: 2023-06-09
Payer: COMMERCIAL

## 2023-06-09 PROCEDURE — G0299 HHS/HOSPICE OF RN EA 15 MIN: HCPCS

## 2023-06-12 ENCOUNTER — READMISSION MANAGEMENT (OUTPATIENT)
Dept: CALL CENTER | Facility: HOSPITAL | Age: 63
End: 2023-06-12
Payer: MEDICARE

## 2023-06-12 ENCOUNTER — HOME CARE VISIT (OUTPATIENT)
Dept: HOME HEALTH SERVICES | Facility: CLINIC | Age: 63
End: 2023-06-12
Payer: COMMERCIAL

## 2023-06-12 VITALS
DIASTOLIC BLOOD PRESSURE: 82 MMHG | SYSTOLIC BLOOD PRESSURE: 116 MMHG | OXYGEN SATURATION: 98 % | HEART RATE: 81 BPM | RESPIRATION RATE: 18 BRPM | TEMPERATURE: 98.5 F

## 2023-06-12 PROCEDURE — G0299 HHS/HOSPICE OF RN EA 15 MIN: HCPCS

## 2023-06-12 NOTE — OUTREACH NOTE
Medical Week 3 Survey      Flowsheet Row Responses   [facility] patient discharged from? [place]   Does the patient have one of the following disease processes/diagnoses(primary or secondary)? Other   Week 3 attempt successful? No   Unsuccessful attempts Attempt 2            Sarah LEE - Registered Nurse

## 2023-06-14 ENCOUNTER — OFFICE VISIT (OUTPATIENT)
Dept: WOUND CARE | Facility: HOSPITAL | Age: 63
End: 2023-06-14
Payer: MEDICARE

## 2023-06-14 ENCOUNTER — OUTSIDE FACILITY SERVICE (OUTPATIENT)
Dept: WOUND CARE | Facility: HOSPITAL | Age: 63
End: 2023-06-14
Payer: MEDICARE

## 2023-06-14 VITALS
DIASTOLIC BLOOD PRESSURE: 92 MMHG | TEMPERATURE: 97.9 F | HEART RATE: 89 BPM | RESPIRATION RATE: 18 BRPM | SYSTOLIC BLOOD PRESSURE: 150 MMHG | OXYGEN SATURATION: 98 %

## 2023-06-16 ENCOUNTER — HOME CARE VISIT (OUTPATIENT)
Dept: HOME HEALTH SERVICES | Facility: CLINIC | Age: 63
End: 2023-06-16
Payer: COMMERCIAL

## 2023-06-16 ENCOUNTER — OFFICE VISIT (OUTPATIENT)
Dept: ORTHOPEDIC SURGERY | Facility: CLINIC | Age: 63
End: 2023-06-16
Payer: MEDICARE

## 2023-06-16 VITALS — HEIGHT: 62 IN | WEIGHT: 240 LBS | BODY MASS INDEX: 44.16 KG/M2

## 2023-06-16 VITALS
DIASTOLIC BLOOD PRESSURE: 68 MMHG | TEMPERATURE: 97.8 F | RESPIRATION RATE: 16 BRPM | OXYGEN SATURATION: 98 % | SYSTOLIC BLOOD PRESSURE: 110 MMHG | HEART RATE: 68 BPM

## 2023-06-16 DIAGNOSIS — Z89.512 HX OF BKA, LEFT: Primary | ICD-10-CM

## 2023-06-16 DIAGNOSIS — I27.20 PULMONARY HYPERTENSION: ICD-10-CM

## 2023-06-16 DIAGNOSIS — J44.9 OSA AND COPD OVERLAP SYNDROME: ICD-10-CM

## 2023-06-16 DIAGNOSIS — G47.33 OSA AND COPD OVERLAP SYNDROME: ICD-10-CM

## 2023-06-16 DIAGNOSIS — N18.6 CKD (CHRONIC KIDNEY DISEASE) REQUIRING CHRONIC DIALYSIS: ICD-10-CM

## 2023-06-16 DIAGNOSIS — E66.01 MORBID OBESITY WITH BMI OF 45.0-49.9, ADULT: ICD-10-CM

## 2023-06-16 DIAGNOSIS — Z99.2 CKD (CHRONIC KIDNEY DISEASE) REQUIRING CHRONIC DIALYSIS: ICD-10-CM

## 2023-06-16 PROCEDURE — G0299 HHS/HOSPICE OF RN EA 15 MIN: HCPCS

## 2023-06-16 NOTE — PROGRESS NOTES
"Yamileth Slater is a 63 y.o. female returns for     Chief Complaint   Patient presents with    Left Leg - Follow-up, Amputation       HISTORY OF PRESENT ILLNESS:  Patient returns for wound check and following the left below-knee amputation.  She states that she has been using a stump  and reports no skin problems at this point.  She also has been fitted for a prosthesis.  She reports no problems at this time.     CONCURRENT MEDICAL HISTORY:    The following portions of the patient's history were reviewed and updated as appropriate: allergies, current medications, past family history, past medical history, past social history, past surgical history and problem list.     ROS  No fevers or chills.  No chest pain or shortness of air.  No GI or  disturbances.    PHYSICAL EXAMINATION:       Ht 157.5 cm (62\")   Wt 109 kg (240 lb)   LMP  (LMP Unknown)   BMI 43.90 kg/m²     Physical Exam  Constitutional:       General: She is not in acute distress.     Appearance: Normal appearance.   Pulmonary:      Effort: Pulmonary effort is normal. No respiratory distress.   Neurological:      Mental Status: She is alert and oriented to person, place, and time.       GAIT:     []  Normal  [x]  Antalgic    Assistive device: []  None  []  Walker     []  Crutches  []  Cane     [x]  Wheelchair  []  Stretcher    Left Knee Exam     Comments:  Incision line is well-healed.  Good scarring.  Good capillary refill involving the stump.  No erythema.  Good knee extension and good knee flexion.                    ASSESSMENT:    Diagnoses and all orders for this visit:    Hx of BKA, left  -     Ambulatory Referral to Home Health (Outpatient)    MIKE and COPD overlap syndrome    Morbid obesity with BMI of 45.0-49.9, adult    CKD (chronic kidney disease) requiring chronic dialysis    Pulmonary hypertension          PLAN    Continue general strength and conditioning exercises.  Slowly increase activity as tolerated.  We discussed " continued blood sugar control and weight management.  We discussed continued observance of skin protection and constant vigilance on stump care.  Continue to progress with use of the prosthesis.  Begin physical therapy to help with ambulation, transfers, and home assessment.      Pt:  HOME HEALTH.  Work with ambulation.    Rom/str both legs.  Stairs.  Modalities as needed.    Return if symptoms worsen or fail to improve, for recheck.    Huy White MD

## 2023-07-10 ENCOUNTER — OFFICE VISIT (OUTPATIENT)
Dept: CARDIAC SURGERY | Facility: CLINIC | Age: 63
End: 2023-07-10
Payer: MEDICARE

## 2023-07-10 VITALS
DIASTOLIC BLOOD PRESSURE: 82 MMHG | TEMPERATURE: 97.5 F | SYSTOLIC BLOOD PRESSURE: 126 MMHG | HEART RATE: 75 BPM | OXYGEN SATURATION: 97 %

## 2023-07-10 DIAGNOSIS — E78.2 MIXED HYPERLIPIDEMIA: ICD-10-CM

## 2023-07-10 DIAGNOSIS — I25.10 CORONARY ARTERY DISEASE INVOLVING NATIVE CORONARY ARTERY OF NATIVE HEART WITHOUT ANGINA PECTORIS: ICD-10-CM

## 2023-07-10 DIAGNOSIS — J44.9 OSA AND COPD OVERLAP SYNDROME: ICD-10-CM

## 2023-07-10 DIAGNOSIS — M54.50 CHRONIC MIDLINE LOW BACK PAIN WITHOUT SCIATICA: Primary | ICD-10-CM

## 2023-07-10 DIAGNOSIS — G89.29 CHRONIC MIDLINE LOW BACK PAIN WITHOUT SCIATICA: Primary | ICD-10-CM

## 2023-07-10 DIAGNOSIS — I10 PRIMARY HYPERTENSION: ICD-10-CM

## 2023-07-10 DIAGNOSIS — I25.110 CORONARY ARTERY DISEASE INVOLVING NATIVE CORONARY ARTERY OF NATIVE HEART WITH UNSTABLE ANGINA PECTORIS: ICD-10-CM

## 2023-07-10 DIAGNOSIS — I27.20 PULMONARY HYPERTENSION: ICD-10-CM

## 2023-07-10 DIAGNOSIS — Z89.512 HX OF BKA, LEFT: ICD-10-CM

## 2023-07-10 DIAGNOSIS — Z99.2 ESRD ON HEMODIALYSIS: ICD-10-CM

## 2023-07-10 DIAGNOSIS — N18.6 ESRD ON HEMODIALYSIS: ICD-10-CM

## 2023-07-10 DIAGNOSIS — J41.1 MUCOPURULENT CHRONIC BRONCHITIS: ICD-10-CM

## 2023-07-10 DIAGNOSIS — G47.33 OSA AND COPD OVERLAP SYNDROME: ICD-10-CM

## 2023-07-10 DIAGNOSIS — I65.23 BILATERAL CAROTID ARTERY STENOSIS: ICD-10-CM

## 2023-07-10 PROCEDURE — 36589 REMOVAL TUNNELED CV CATH: CPT | Performed by: THORACIC SURGERY (CARDIOTHORACIC VASCULAR SURGERY)

## 2023-07-10 PROCEDURE — 3079F DIAST BP 80-89 MM HG: CPT | Performed by: THORACIC SURGERY (CARDIOTHORACIC VASCULAR SURGERY)

## 2023-07-10 PROCEDURE — 3074F SYST BP LT 130 MM HG: CPT | Performed by: THORACIC SURGERY (CARDIOTHORACIC VASCULAR SURGERY)

## 2023-07-10 PROCEDURE — 99214 OFFICE O/P EST MOD 30 MIN: CPT | Performed by: THORACIC SURGERY (CARDIOTHORACIC VASCULAR SURGERY)

## 2023-07-19 ENCOUNTER — OUTSIDE FACILITY SERVICE (OUTPATIENT)
Dept: WOUND CARE | Facility: HOSPITAL | Age: 63
End: 2023-07-19
Payer: MEDICARE

## 2023-07-23 ENCOUNTER — HOME CARE VISIT (OUTPATIENT)
Dept: HOME HEALTH SERVICES | Facility: CLINIC | Age: 63
End: 2023-07-23
Payer: COMMERCIAL

## 2023-07-23 PROCEDURE — G0299 HHS/HOSPICE OF RN EA 15 MIN: HCPCS

## 2023-07-25 VITALS
DIASTOLIC BLOOD PRESSURE: 90 MMHG | HEART RATE: 78 BPM | OXYGEN SATURATION: 98 % | RESPIRATION RATE: 18 BRPM | SYSTOLIC BLOOD PRESSURE: 142 MMHG | TEMPERATURE: 97.1 F

## 2023-07-26 ENCOUNTER — OUTSIDE FACILITY SERVICE (OUTPATIENT)
Dept: WOUND CARE | Facility: HOSPITAL | Age: 63
End: 2023-07-26
Payer: MEDICARE

## 2023-07-26 ENCOUNTER — READMISSION MANAGEMENT (OUTPATIENT)
Dept: CALL CENTER | Facility: HOSPITAL | Age: 63
End: 2023-07-26
Payer: MEDICAID

## 2023-07-28 ENCOUNTER — HOME CARE VISIT (OUTPATIENT)
Dept: HOME HEALTH SERVICES | Facility: CLINIC | Age: 63
End: 2023-07-28
Payer: MEDICAID

## 2023-07-28 VITALS
DIASTOLIC BLOOD PRESSURE: 64 MMHG | HEART RATE: 72 BPM | RESPIRATION RATE: 16 BRPM | SYSTOLIC BLOOD PRESSURE: 110 MMHG | OXYGEN SATURATION: 99 % | TEMPERATURE: 96.9 F

## 2023-07-28 PROCEDURE — G0299 HHS/HOSPICE OF RN EA 15 MIN: HCPCS

## 2023-07-28 NOTE — HOME HEALTH
HH VISIT MADE TODAY FOR WOUND CARE TO PT (R) FOOT, PT DID NOT GO TO HER APPOINTMENT ON WENDESDAY, 7/26/23, SPOUSE DID NOT SHOW UP TO TAKE HER. DRESSING REMOVED, CLEANED WITH WOUND CLEANSER, APPLIED COLLAGEN TO WD BED AND WRAPPED WITH COFLEX LITE TLC WITH ZINC AND COBAN. CLEANSED 3RD TOE WITH CLEANSER, PAINTED WITH BETADINE AND COVERED WITH BANDAID. PT TOLERATED WELL. PT WILL NOT BE SEEN AGAIN UNTIL AFTER NEXT WD CARE VISIT ON WEDNESDAY, 8/2/23, AND PT NOTIFIED.

## 2023-07-29 ENCOUNTER — HOSPITAL ENCOUNTER (OUTPATIENT)
Facility: HOSPITAL | Age: 63
Setting detail: OBSERVATION
Discharge: HOME | End: 2023-07-30
Attending: STUDENT IN AN ORGANIZED HEALTH CARE EDUCATION/TRAINING PROGRAM | Admitting: HOSPITALIST
Payer: MEDICARE

## 2023-07-29 DIAGNOSIS — Z99.2 ESRD NEEDING DIALYSIS: Primary | ICD-10-CM

## 2023-07-29 DIAGNOSIS — N18.6 ESRD NEEDING DIALYSIS: Primary | ICD-10-CM

## 2023-07-29 LAB
ALBUMIN SERPL-MCNC: 3.5 G/DL (ref 3.5–5.2)
ALBUMIN/GLOB SERPL: 0.8 G/DL
ALP SERPL-CCNC: 182 U/L (ref 39–117)
ALT SERPL W P-5'-P-CCNC: 9 U/L (ref 1–33)
ANION GAP SERPL CALCULATED.3IONS-SCNC: 11 MMOL/L (ref 5–15)
AST SERPL-CCNC: 11 U/L (ref 1–32)
BASOPHILS # BLD AUTO: 0.09 10*3/MM3 (ref 0–0.2)
BASOPHILS NFR BLD AUTO: 1.1 % (ref 0–1.5)
BILIRUB SERPL-MCNC: 0.3 MG/DL (ref 0–1.2)
BUN SERPL-MCNC: 61 MG/DL (ref 8–23)
BUN/CREAT SERPL: 10.9 (ref 7–25)
CALCIUM SPEC-SCNC: 10 MG/DL (ref 8.6–10.5)
CHLORIDE SERPL-SCNC: 97 MMOL/L (ref 98–107)
CO2 SERPL-SCNC: 26 MMOL/L (ref 22–29)
CREAT SERPL-MCNC: 5.6 MG/DL (ref 0.57–1)
DEPRECATED RDW RBC AUTO: 50.8 FL (ref 37–54)
EGFRCR SERPLBLD CKD-EPI 2021: 8 ML/MIN/1.73
EOSINOPHIL # BLD AUTO: 0.23 10*3/MM3 (ref 0–0.4)
EOSINOPHIL NFR BLD AUTO: 2.8 % (ref 0.3–6.2)
ERYTHROCYTE [DISTWIDTH] IN BLOOD BY AUTOMATED COUNT: 15.3 % (ref 12.3–15.4)
GLOBULIN UR ELPH-MCNC: 4.5 GM/DL
GLUCOSE BLDC GLUCOMTR-MCNC: 237 MG/DL (ref 70–130)
GLUCOSE SERPL-MCNC: 150 MG/DL (ref 65–99)
HBA1C MFR BLD: 5.2 % (ref 4.8–5.6)
HBV SURFACE AG SERPL QL IA: NORMAL
HCT VFR BLD AUTO: 30 % (ref 34–46.6)
HGB BLD-MCNC: 9.4 G/DL (ref 12–15.9)
HOLD SPECIMEN: NORMAL
IMM GRANULOCYTES # BLD AUTO: 0.04 10*3/MM3 (ref 0–0.05)
IMM GRANULOCYTES NFR BLD AUTO: 0.5 % (ref 0–0.5)
LYMPHOCYTES # BLD AUTO: 2.22 10*3/MM3 (ref 0.7–3.1)
LYMPHOCYTES NFR BLD AUTO: 26.6 % (ref 19.6–45.3)
MCH RBC QN AUTO: 28.4 PG (ref 26.6–33)
MCHC RBC AUTO-ENTMCNC: 31.3 G/DL (ref 31.5–35.7)
MCV RBC AUTO: 90.6 FL (ref 79–97)
MONOCYTES # BLD AUTO: 0.64 10*3/MM3 (ref 0.1–0.9)
MONOCYTES NFR BLD AUTO: 7.7 % (ref 5–12)
NEUTROPHILS NFR BLD AUTO: 5.12 10*3/MM3 (ref 1.7–7)
NEUTROPHILS NFR BLD AUTO: 61.3 % (ref 42.7–76)
NRBC BLD AUTO-RTO: 0 /100 WBC (ref 0–0.2)
PLATELET # BLD AUTO: 147 10*3/MM3 (ref 140–450)
PMV BLD AUTO: 8.4 FL (ref 6–12)
POTASSIUM SERPL-SCNC: 6.1 MMOL/L (ref 3.5–5.2)
PROT SERPL-MCNC: 8 G/DL (ref 6–8.5)
RBC # BLD AUTO: 3.31 10*6/MM3 (ref 3.77–5.28)
SODIUM SERPL-SCNC: 134 MMOL/L (ref 136–145)
T4 FREE SERPL-MCNC: 1.1 NG/DL (ref 0.93–1.7)
TSH SERPL DL<=0.05 MIU/L-ACNC: 0.28 UIU/ML (ref 0.27–4.2)
WBC NRBC COR # BLD: 8.34 10*3/MM3 (ref 3.4–10.8)

## 2023-07-29 PROCEDURE — 63710000001 INSULIN DETEMIR PER 5 UNITS: Performed by: HOSPITALIST

## 2023-07-29 PROCEDURE — 25010000002 HEPARIN (PORCINE) PER 1000 UNITS: Performed by: INTERNAL MEDICINE

## 2023-07-29 PROCEDURE — 80053 COMPREHEN METABOLIC PANEL: CPT | Performed by: STUDENT IN AN ORGANIZED HEALTH CARE EDUCATION/TRAINING PROGRAM

## 2023-07-29 PROCEDURE — 83036 HEMOGLOBIN GLYCOSYLATED A1C: CPT | Performed by: HOSPITALIST

## 2023-07-29 PROCEDURE — G0378 HOSPITAL OBSERVATION PER HR: HCPCS

## 2023-07-29 PROCEDURE — 99284 EMERGENCY DEPT VISIT MOD MDM: CPT

## 2023-07-29 PROCEDURE — 87340 HEPATITIS B SURFACE AG IA: CPT | Performed by: INTERNAL MEDICINE

## 2023-07-29 PROCEDURE — 36415 COLL VENOUS BLD VENIPUNCTURE: CPT | Performed by: STUDENT IN AN ORGANIZED HEALTH CARE EDUCATION/TRAINING PROGRAM

## 2023-07-29 PROCEDURE — 84439 ASSAY OF FREE THYROXINE: CPT | Performed by: HOSPITALIST

## 2023-07-29 PROCEDURE — 84443 ASSAY THYROID STIM HORMONE: CPT | Performed by: HOSPITALIST

## 2023-07-29 PROCEDURE — 82948 REAGENT STRIP/BLOOD GLUCOSE: CPT

## 2023-07-29 PROCEDURE — 85025 COMPLETE CBC W/AUTO DIFF WBC: CPT | Performed by: STUDENT IN AN ORGANIZED HEALTH CARE EDUCATION/TRAINING PROGRAM

## 2023-07-29 PROCEDURE — G0257 UNSCHED DIALYSIS ESRD PT HOS: HCPCS

## 2023-07-29 RX ORDER — SODIUM CHLORIDE 0.9 % (FLUSH) 0.9 %
10 SYRINGE (ML) INJECTION EVERY 12 HOURS SCHEDULED
Status: DISCONTINUED | OUTPATIENT
Start: 2023-07-29 | End: 2023-07-30 | Stop reason: HOSPADM

## 2023-07-29 RX ORDER — LOSARTAN POTASSIUM 25 MG/1
25 TABLET ORAL DAILY
Status: DISCONTINUED | OUTPATIENT
Start: 2023-07-30 | End: 2023-07-30 | Stop reason: HOSPADM

## 2023-07-29 RX ORDER — NITROGLYCERIN 0.4 MG/1
0.4 TABLET SUBLINGUAL
Status: DISCONTINUED | OUTPATIENT
Start: 2023-07-29 | End: 2023-07-30 | Stop reason: HOSPADM

## 2023-07-29 RX ORDER — AMOXICILLIN 250 MG
2 CAPSULE ORAL 2 TIMES DAILY
Status: DISCONTINUED | OUTPATIENT
Start: 2023-07-29 | End: 2023-07-30 | Stop reason: HOSPADM

## 2023-07-29 RX ORDER — ONDANSETRON 4 MG/1
4 TABLET, FILM COATED ORAL EVERY 6 HOURS PRN
Status: DISCONTINUED | OUTPATIENT
Start: 2023-07-29 | End: 2023-07-30 | Stop reason: HOSPADM

## 2023-07-29 RX ORDER — ONDANSETRON 2 MG/ML
4 INJECTION INTRAMUSCULAR; INTRAVENOUS EVERY 6 HOURS PRN
Status: DISCONTINUED | OUTPATIENT
Start: 2023-07-29 | End: 2023-07-30 | Stop reason: HOSPADM

## 2023-07-29 RX ORDER — INSULIN ASPART 100 [IU]/ML
0-7 INJECTION, SOLUTION INTRAVENOUS; SUBCUTANEOUS
Status: DISCONTINUED | OUTPATIENT
Start: 2023-07-30 | End: 2023-07-30 | Stop reason: HOSPADM

## 2023-07-29 RX ORDER — DULOXETIN HYDROCHLORIDE 20 MG/1
20 CAPSULE, DELAYED RELEASE ORAL DAILY
Status: DISCONTINUED | OUTPATIENT
Start: 2023-07-30 | End: 2023-07-30 | Stop reason: HOSPADM

## 2023-07-29 RX ORDER — CALCIUM CARBONATE 500 MG/1
2 TABLET, CHEWABLE ORAL 2 TIMES DAILY PRN
Status: DISCONTINUED | OUTPATIENT
Start: 2023-07-29 | End: 2023-07-30 | Stop reason: HOSPADM

## 2023-07-29 RX ORDER — CLOPIDOGREL BISULFATE 75 MG/1
75 TABLET ORAL DAILY
Status: DISCONTINUED | OUTPATIENT
Start: 2023-07-30 | End: 2023-07-30 | Stop reason: HOSPADM

## 2023-07-29 RX ORDER — POLYETHYLENE GLYCOL 3350 17 G/17G
17 POWDER, FOR SOLUTION ORAL DAILY PRN
Status: DISCONTINUED | OUTPATIENT
Start: 2023-07-29 | End: 2023-07-30 | Stop reason: HOSPADM

## 2023-07-29 RX ORDER — LORAZEPAM 0.5 MG/1
0.5 TABLET ORAL EVERY 8 HOURS PRN
Status: DISCONTINUED | OUTPATIENT
Start: 2023-07-29 | End: 2023-07-30 | Stop reason: HOSPADM

## 2023-07-29 RX ORDER — CARVEDILOL 6.25 MG/1
6.25 TABLET ORAL 2 TIMES DAILY WITH MEALS
Status: DISCONTINUED | OUTPATIENT
Start: 2023-07-29 | End: 2023-07-30 | Stop reason: HOSPADM

## 2023-07-29 RX ORDER — CHOLECALCIFEROL (VITAMIN D3) 125 MCG
5 CAPSULE ORAL NIGHTLY PRN
Status: DISCONTINUED | OUTPATIENT
Start: 2023-07-29 | End: 2023-07-30 | Stop reason: HOSPADM

## 2023-07-29 RX ORDER — CYCLOBENZAPRINE HCL 5 MG
5 TABLET ORAL 2 TIMES DAILY
Status: DISCONTINUED | OUTPATIENT
Start: 2023-07-29 | End: 2023-07-30 | Stop reason: HOSPADM

## 2023-07-29 RX ORDER — ATORVASTATIN CALCIUM 40 MG/1
40 TABLET, FILM COATED ORAL NIGHTLY
Status: DISCONTINUED | OUTPATIENT
Start: 2023-07-29 | End: 2023-07-30 | Stop reason: HOSPADM

## 2023-07-29 RX ORDER — SEVELAMER CARBONATE 800 MG/1
800 TABLET, FILM COATED ORAL
Status: DISCONTINUED | OUTPATIENT
Start: 2023-07-29 | End: 2023-07-30 | Stop reason: HOSPADM

## 2023-07-29 RX ORDER — GABAPENTIN 300 MG/1
300 CAPSULE ORAL DAILY
Status: DISCONTINUED | OUTPATIENT
Start: 2023-07-30 | End: 2023-07-30 | Stop reason: HOSPADM

## 2023-07-29 RX ORDER — HEPARIN SODIUM 1000 [USP'U]/ML
2000 INJECTION, SOLUTION INTRAVENOUS; SUBCUTANEOUS AS NEEDED
Status: DISCONTINUED | OUTPATIENT
Start: 2023-07-29 | End: 2023-07-30 | Stop reason: HOSPADM

## 2023-07-29 RX ORDER — OXYCODONE HYDROCHLORIDE 10 MG/1
10 TABLET ORAL 3 TIMES DAILY
Status: DISCONTINUED | OUTPATIENT
Start: 2023-07-29 | End: 2023-07-30 | Stop reason: HOSPADM

## 2023-07-29 RX ORDER — MEMANTINE HYDROCHLORIDE 5 MG/1
5 TABLET ORAL 2 TIMES DAILY
Status: DISCONTINUED | OUTPATIENT
Start: 2023-07-29 | End: 2023-07-30 | Stop reason: HOSPADM

## 2023-07-29 RX ORDER — BISACODYL 10 MG
10 SUPPOSITORY, RECTAL RECTAL DAILY PRN
Status: DISCONTINUED | OUTPATIENT
Start: 2023-07-29 | End: 2023-07-30 | Stop reason: HOSPADM

## 2023-07-29 RX ORDER — IPRATROPIUM BROMIDE AND ALBUTEROL SULFATE 2.5; .5 MG/3ML; MG/3ML
3 SOLUTION RESPIRATORY (INHALATION) EVERY 6 HOURS PRN
Status: DISCONTINUED | OUTPATIENT
Start: 2023-07-29 | End: 2023-07-30 | Stop reason: HOSPADM

## 2023-07-29 RX ORDER — BISACODYL 5 MG/1
5 TABLET, DELAYED RELEASE ORAL DAILY PRN
Status: DISCONTINUED | OUTPATIENT
Start: 2023-07-29 | End: 2023-07-30 | Stop reason: HOSPADM

## 2023-07-29 RX ORDER — NICOTINE POLACRILEX 4 MG
15 LOZENGE BUCCAL
Status: DISCONTINUED | OUTPATIENT
Start: 2023-07-29 | End: 2023-07-30 | Stop reason: HOSPADM

## 2023-07-29 RX ORDER — DEXTROSE MONOHYDRATE 25 G/50ML
25 INJECTION, SOLUTION INTRAVENOUS
Status: DISCONTINUED | OUTPATIENT
Start: 2023-07-29 | End: 2023-07-30 | Stop reason: HOSPADM

## 2023-07-29 RX ORDER — SODIUM CHLORIDE 0.9 % (FLUSH) 0.9 %
10 SYRINGE (ML) INJECTION AS NEEDED
Status: DISCONTINUED | OUTPATIENT
Start: 2023-07-29 | End: 2023-07-30 | Stop reason: HOSPADM

## 2023-07-29 RX ORDER — HEPARIN SODIUM 5000 [USP'U]/ML
5000 INJECTION, SOLUTION INTRAVENOUS; SUBCUTANEOUS EVERY 12 HOURS SCHEDULED
Status: DISCONTINUED | OUTPATIENT
Start: 2023-07-29 | End: 2023-07-30 | Stop reason: HOSPADM

## 2023-07-29 RX ORDER — SODIUM CHLORIDE 9 MG/ML
40 INJECTION, SOLUTION INTRAVENOUS AS NEEDED
Status: DISCONTINUED | OUTPATIENT
Start: 2023-07-29 | End: 2023-07-30 | Stop reason: HOSPADM

## 2023-07-29 RX ORDER — PANTOPRAZOLE SODIUM 40 MG/1
40 TABLET, DELAYED RELEASE ORAL NIGHTLY
Status: DISCONTINUED | OUTPATIENT
Start: 2023-07-29 | End: 2023-07-30 | Stop reason: HOSPADM

## 2023-07-29 RX ADMIN — CARVEDILOL 6.25 MG: 6.25 TABLET, FILM COATED ORAL at 17:10

## 2023-07-29 RX ADMIN — HEPARIN SODIUM 2000 UNITS: 1000 INJECTION INTRAVENOUS; SUBCUTANEOUS at 13:57

## 2023-07-29 RX ADMIN — INSULIN DETEMIR 24 UNITS: 100 INJECTION, SOLUTION SUBCUTANEOUS at 22:25

## 2023-07-29 RX ADMIN — OXYCODONE HYDROCHLORIDE 10 MG: 10 TABLET ORAL at 17:10

## 2023-07-29 RX ADMIN — MEMANTINE 5 MG: 5 TABLET ORAL at 21:42

## 2023-07-29 RX ADMIN — SEVELAMER CARBONATE 800 MG: 800 TABLET, FILM COATED ORAL at 17:10

## 2023-07-29 RX ADMIN — CYCLOBENZAPRINE 5 MG: 5 TABLET, FILM COATED ORAL at 21:42

## 2023-07-29 RX ADMIN — PANTOPRAZOLE SODIUM 40 MG: 40 TABLET, DELAYED RELEASE ORAL at 21:42

## 2023-07-29 RX ADMIN — Medication 10 ML: at 21:44

## 2023-07-29 RX ADMIN — OXYCODONE HYDROCHLORIDE 10 MG: 10 TABLET ORAL at 21:42

## 2023-07-29 RX ADMIN — ATORVASTATIN CALCIUM 40 MG: 40 TABLET, FILM COATED ORAL at 21:42

## 2023-07-30 ENCOUNTER — READMISSION MANAGEMENT (OUTPATIENT)
Dept: CALL CENTER | Facility: HOSPITAL | Age: 63
End: 2023-07-30
Payer: MEDICAID

## 2023-07-30 VITALS
DIASTOLIC BLOOD PRESSURE: 61 MMHG | OXYGEN SATURATION: 94 % | HEIGHT: 62 IN | SYSTOLIC BLOOD PRESSURE: 153 MMHG | HEART RATE: 68 BPM | TEMPERATURE: 98.4 F | RESPIRATION RATE: 18 BRPM | BODY MASS INDEX: 45.34 KG/M2 | WEIGHT: 246.4 LBS

## 2023-07-30 LAB
ALBUMIN SERPL-MCNC: 3.7 G/DL (ref 3.5–5.2)
ALBUMIN/GLOB SERPL: 0.7 G/DL
ALP SERPL-CCNC: 203 U/L (ref 39–117)
ALT SERPL W P-5'-P-CCNC: 10 U/L (ref 1–33)
ANION GAP SERPL CALCULATED.3IONS-SCNC: 12 MMOL/L (ref 5–15)
AST SERPL-CCNC: 16 U/L (ref 1–32)
BACTERIA UR QL AUTO: ABNORMAL /HPF
BASOPHILS # BLD AUTO: 0.11 10*3/MM3 (ref 0–0.2)
BASOPHILS NFR BLD AUTO: 1.4 % (ref 0–1.5)
BILIRUB SERPL-MCNC: 0.4 MG/DL (ref 0–1.2)
BILIRUB UR QL STRIP: NEGATIVE
BUN SERPL-MCNC: 45 MG/DL (ref 8–23)
BUN/CREAT SERPL: 9.1 (ref 7–25)
CALCIUM SPEC-SCNC: 10 MG/DL (ref 8.6–10.5)
CHLORIDE SERPL-SCNC: 93 MMOL/L (ref 98–107)
CLARITY UR: ABNORMAL
CO2 SERPL-SCNC: 28 MMOL/L (ref 22–29)
COLOR UR: YELLOW
CREAT SERPL-MCNC: 4.92 MG/DL (ref 0.57–1)
DEPRECATED RDW RBC AUTO: 49.5 FL (ref 37–54)
EGFRCR SERPLBLD CKD-EPI 2021: 9.3 ML/MIN/1.73
EOSINOPHIL # BLD AUTO: 0.26 10*3/MM3 (ref 0–0.4)
EOSINOPHIL NFR BLD AUTO: 3.3 % (ref 0.3–6.2)
ERYTHROCYTE [DISTWIDTH] IN BLOOD BY AUTOMATED COUNT: 15.1 % (ref 12.3–15.4)
GLOBULIN UR ELPH-MCNC: 5 GM/DL
GLUCOSE BLDC GLUCOMTR-MCNC: 158 MG/DL (ref 70–130)
GLUCOSE BLDC GLUCOMTR-MCNC: 238 MG/DL (ref 70–130)
GLUCOSE SERPL-MCNC: 158 MG/DL (ref 65–99)
GLUCOSE UR STRIP-MCNC: NEGATIVE MG/DL
HCT VFR BLD AUTO: 33.3 % (ref 34–46.6)
HGB BLD-MCNC: 10.7 G/DL (ref 12–15.9)
HGB UR QL STRIP.AUTO: ABNORMAL
HYALINE CASTS UR QL AUTO: ABNORMAL /LPF
IMM GRANULOCYTES # BLD AUTO: 0.06 10*3/MM3 (ref 0–0.05)
IMM GRANULOCYTES NFR BLD AUTO: 0.8 % (ref 0–0.5)
KETONES UR QL STRIP: NEGATIVE
LEUKOCYTE ESTERASE UR QL STRIP.AUTO: ABNORMAL
LYMPHOCYTES # BLD AUTO: 2.27 10*3/MM3 (ref 0.7–3.1)
LYMPHOCYTES NFR BLD AUTO: 28.6 % (ref 19.6–45.3)
MAGNESIUM SERPL-MCNC: 1.8 MG/DL (ref 1.6–2.4)
MCH RBC QN AUTO: 28.8 PG (ref 26.6–33)
MCHC RBC AUTO-ENTMCNC: 32.1 G/DL (ref 31.5–35.7)
MCV RBC AUTO: 89.8 FL (ref 79–97)
MONOCYTES # BLD AUTO: 0.69 10*3/MM3 (ref 0.1–0.9)
MONOCYTES NFR BLD AUTO: 8.7 % (ref 5–12)
NEUTROPHILS NFR BLD AUTO: 4.54 10*3/MM3 (ref 1.7–7)
NEUTROPHILS NFR BLD AUTO: 57.2 % (ref 42.7–76)
NITRITE UR QL STRIP: NEGATIVE
NRBC BLD AUTO-RTO: 0 /100 WBC (ref 0–0.2)
PH UR STRIP.AUTO: 7.5 [PH] (ref 5–9)
PLATELET # BLD AUTO: 169 10*3/MM3 (ref 140–450)
PMV BLD AUTO: 8.8 FL (ref 6–12)
POTASSIUM SERPL-SCNC: 5.6 MMOL/L (ref 3.5–5.2)
PROT SERPL-MCNC: 8.7 G/DL (ref 6–8.5)
PROT UR QL STRIP: ABNORMAL
RBC # BLD AUTO: 3.71 10*6/MM3 (ref 3.77–5.28)
RBC # UR STRIP: ABNORMAL /HPF
REF LAB TEST METHOD: ABNORMAL
SODIUM SERPL-SCNC: 133 MMOL/L (ref 136–145)
SP GR UR STRIP: 1.01 (ref 1–1.03)
SQUAMOUS #/AREA URNS HPF: ABNORMAL /HPF
UROBILINOGEN UR QL STRIP: ABNORMAL
WBC # UR STRIP: ABNORMAL /HPF
WBC NRBC COR # BLD: 7.93 10*3/MM3 (ref 3.4–10.8)

## 2023-07-30 PROCEDURE — 81001 URINALYSIS AUTO W/SCOPE: CPT

## 2023-07-30 PROCEDURE — 82948 REAGENT STRIP/BLOOD GLUCOSE: CPT

## 2023-07-30 PROCEDURE — 63710000001 INSULIN DETEMIR PER 5 UNITS: Performed by: HOSPITALIST

## 2023-07-30 PROCEDURE — 87186 SC STD MICRODIL/AGAR DIL: CPT

## 2023-07-30 PROCEDURE — 87077 CULTURE AEROBIC IDENTIFY: CPT

## 2023-07-30 PROCEDURE — 80053 COMPREHEN METABOLIC PANEL: CPT | Performed by: HOSPITALIST

## 2023-07-30 PROCEDURE — 85025 COMPLETE CBC W/AUTO DIFF WBC: CPT | Performed by: HOSPITALIST

## 2023-07-30 PROCEDURE — 83735 ASSAY OF MAGNESIUM: CPT | Performed by: HOSPITALIST

## 2023-07-30 PROCEDURE — G0378 HOSPITAL OBSERVATION PER HR: HCPCS

## 2023-07-30 PROCEDURE — 87086 URINE CULTURE/COLONY COUNT: CPT

## 2023-07-30 RX ORDER — CEFDINIR 300 MG/1
300 CAPSULE ORAL DAILY
Qty: 5 CAPSULE | Refills: 0 | Status: SHIPPED | OUTPATIENT
Start: 2023-07-30 | End: 2023-08-04

## 2023-07-30 RX ADMIN — CLOPIDOGREL BISULFATE 75 MG: 75 TABLET ORAL at 08:00

## 2023-07-30 RX ADMIN — INSULIN DETEMIR 24 UNITS: 100 INJECTION, SOLUTION SUBCUTANEOUS at 08:00

## 2023-07-30 RX ADMIN — DULOXETINE HYDROCHLORIDE 20 MG: 20 CAPSULE, DELAYED RELEASE ORAL at 08:00

## 2023-07-30 RX ADMIN — LOSARTAN POTASSIUM 25 MG: 25 TABLET, FILM COATED ORAL at 08:00

## 2023-07-30 RX ADMIN — OXYCODONE HYDROCHLORIDE 10 MG: 10 TABLET ORAL at 08:00

## 2023-07-30 RX ADMIN — SEVELAMER CARBONATE 800 MG: 800 TABLET, FILM COATED ORAL at 08:00

## 2023-07-30 RX ADMIN — GABAPENTIN 300 MG: 300 CAPSULE ORAL at 08:00

## 2023-07-30 RX ADMIN — CYCLOBENZAPRINE 5 MG: 5 TABLET, FILM COATED ORAL at 08:00

## 2023-07-30 RX ADMIN — SEVELAMER CARBONATE 800 MG: 800 TABLET, FILM COATED ORAL at 11:41

## 2023-07-30 RX ADMIN — MEMANTINE 5 MG: 5 TABLET ORAL at 08:00

## 2023-07-30 RX ADMIN — CARVEDILOL 6.25 MG: 6.25 TABLET, FILM COATED ORAL at 08:00

## 2023-07-30 NOTE — OUTREACH NOTE
Medical Week 3 Survey      Flowsheet Row Responses   [facility] patient discharged from? [place]   Does the patient have one of the following disease processes/diagnoses(primary or secondary)? Other   Week 3 attempt successful? No   Unsuccessful attempts Attempt 2   Revoke Readmitted            BIRGIT LEE - Registered Nurse

## 2023-07-30 NOTE — OUTREACH NOTE
Prep Survey      Flowsheet Row Responses   [facility] patient discharged from? [place]   Is LACE score < 7 ? No   Eligibility Readm Mgmt   Discharge diagnosis ESRD needing dialysis   Does the patient have one of the following disease processes/diagnoses(primary or secondary)? Other   Does the patient have Home health ordered? No   Is there a DME ordered? No   Prep survey completed? Yes            BIRGIT LEE - Registered Nurse

## 2023-08-01 LAB — BACTERIA SPEC AEROBE CULT: ABNORMAL

## 2023-08-02 ENCOUNTER — OFFICE VISIT (OUTPATIENT)
Dept: WOUND CARE | Facility: HOSPITAL | Age: 63
End: 2023-08-02
Payer: MEDICAID

## 2023-08-02 ENCOUNTER — OUTSIDE FACILITY SERVICE (OUTPATIENT)
Dept: WOUND CARE | Facility: HOSPITAL | Age: 63
End: 2023-08-02
Payer: MEDICARE

## 2023-08-03 ENCOUNTER — READMISSION MANAGEMENT (OUTPATIENT)
Dept: CALL CENTER | Facility: HOSPITAL | Age: 63
End: 2023-08-03
Payer: MEDICAID

## 2023-08-04 ENCOUNTER — HOME CARE VISIT (OUTPATIENT)
Dept: HOME HEALTH SERVICES | Facility: CLINIC | Age: 63
End: 2023-08-04
Payer: COMMERCIAL

## 2023-08-04 VITALS
HEART RATE: 76 BPM | TEMPERATURE: 98 F | DIASTOLIC BLOOD PRESSURE: 78 MMHG | OXYGEN SATURATION: 98 % | SYSTOLIC BLOOD PRESSURE: 140 MMHG | RESPIRATION RATE: 16 BRPM

## 2023-08-04 PROCEDURE — G0299 HHS/HOSPICE OF RN EA 15 MIN: HCPCS

## 2023-08-07 ENCOUNTER — READMISSION MANAGEMENT (OUTPATIENT)
Dept: CALL CENTER | Facility: HOSPITAL | Age: 63
End: 2023-08-07
Payer: MEDICAID

## 2023-08-07 NOTE — OUTREACH NOTE
Medical Week 2 Survey      Flowsheet Row Responses   [facility] patient discharged from? [place]   Does the patient have one of the following disease processes/diagnoses(primary or secondary)? Other   Call start time 1728   Discharge diagnosis ESRD needing dialysis   Call end time 1738   Is the patient taking all medications as directed (includes completed medication regime)? Yes   Does the patient have a primary care provider?  Yes   Does the patient have an appointment with their PCP within 7 days of discharge? No   What is preventing the patient from scheduling follow up appointments within 7 days of discharge? Haven't had time   Nursing Interventions Educated patient on importance of making appointment, Advised patient to make appointment   Has the patient kept scheduled appointments due by today? N/A   Comments Explained the importance to follow up with PCP to make sure UTI has cleared up   Psychosocial issues? No   Did the patient receive a copy of their discharge instructions? Yes   Nursing interventions Reviewed instructions with patient   What is the patient's perception of their health status since discharge? Improving   Is the patient/caregiver able to teach back signs and symptoms related to disease process for when to call PCP? Yes   Is the patient/caregiver able to teach back signs and symptoms related to disease process for when to call 911? Yes   Is the patient/caregiver able to teach back the hierarchy of who to call/visit for symptoms/problems? PCP, Specialist, Home health nurse, Urgent Care, ED, 911 Yes   If the patient is a current smoker, are they able to teach back resources for cessation? Not a smoker   Additional teach back comments States she has one more antibiotic left.  She is going to dialysis   Wrap up additional comments Pt to make follow up with PCP.   Call end time 1738            Hilda EDGAR - Licensed Nurse

## 2023-08-07 NOTE — PLAN OF CARE
Goal Outcome Evaluation:  Plan of Care Reviewed With: patient        Progress: improving  Outcome Summary: Pt tolerated tx well this date. Pt was SBA with bed mobility. Pt was CGA/Min A with toilet t/f. Pt was set up with grooming task. Pt gave good effort with UE ther ex. Continue OT POC.   Render Risk Assessment In Note?: no Detail Level: Zone Recommendation Preamble: The following recommendations were made during the visit:OTC Zyrtec and hydrocortisone

## 2023-08-09 ENCOUNTER — OFFICE VISIT (OUTPATIENT)
Dept: WOUND CARE | Facility: HOSPITAL | Age: 63
End: 2023-08-09
Payer: MEDICARE

## 2023-08-09 ENCOUNTER — OUTSIDE FACILITY SERVICE (OUTPATIENT)
Dept: WOUND CARE | Facility: HOSPITAL | Age: 63
End: 2023-08-09
Payer: MEDICARE

## 2023-08-09 ENCOUNTER — HOME CARE VISIT (OUTPATIENT)
Dept: HOME HEALTH SERVICES | Facility: CLINIC | Age: 63
End: 2023-08-09
Payer: MEDICARE

## 2023-08-09 VITALS
RESPIRATION RATE: 16 BRPM | SYSTOLIC BLOOD PRESSURE: 110 MMHG | HEART RATE: 84 BPM | TEMPERATURE: 97.4 F | OXYGEN SATURATION: 95 % | DIASTOLIC BLOOD PRESSURE: 68 MMHG

## 2023-08-13 ENCOUNTER — HOME CARE VISIT (OUTPATIENT)
Dept: HOME HEALTH SERVICES | Facility: CLINIC | Age: 63
End: 2023-08-13
Payer: COMMERCIAL

## 2023-08-13 ENCOUNTER — HOME CARE VISIT (OUTPATIENT)
Dept: HOME HEALTH SERVICES | Facility: HOME HEALTHCARE | Age: 63
End: 2023-08-13
Payer: COMMERCIAL

## 2023-08-13 VITALS
HEART RATE: 82 BPM | SYSTOLIC BLOOD PRESSURE: 202 MMHG | OXYGEN SATURATION: 94 % | TEMPERATURE: 98.5 F | RESPIRATION RATE: 18 BRPM | DIASTOLIC BLOOD PRESSURE: 90 MMHG

## 2023-08-13 PROCEDURE — G0299 HHS/HOSPICE OF RN EA 15 MIN: HCPCS

## 2023-08-16 ENCOUNTER — OUTSIDE FACILITY SERVICE (OUTPATIENT)
Dept: WOUND CARE | Facility: HOSPITAL | Age: 63
End: 2023-08-16
Payer: MEDICARE

## 2023-08-16 ENCOUNTER — OFFICE VISIT (OUTPATIENT)
Dept: WOUND CARE | Facility: HOSPITAL | Age: 63
End: 2023-08-16
Payer: MEDICAID

## 2023-08-16 ENCOUNTER — READMISSION MANAGEMENT (OUTPATIENT)
Dept: CALL CENTER | Facility: HOSPITAL | Age: 63
End: 2023-08-16
Payer: MEDICAID

## 2023-08-16 ENCOUNTER — OFFICE VISIT (OUTPATIENT)
Dept: CARDIAC SURGERY | Facility: CLINIC | Age: 63
End: 2023-08-16
Payer: MEDICARE

## 2023-08-16 VITALS
BODY MASS INDEX: 45.07 KG/M2 | OXYGEN SATURATION: 96 % | HEART RATE: 62 BPM | SYSTOLIC BLOOD PRESSURE: 132 MMHG | HEIGHT: 62 IN | DIASTOLIC BLOOD PRESSURE: 70 MMHG

## 2023-08-16 DIAGNOSIS — I70.211 ATHEROSCLER OF NATIVE ARTERY OF RIGHT LEG WITH INTERMIT CLAUDICATION: ICD-10-CM

## 2023-08-16 DIAGNOSIS — I77.0 A-V FISTULA: Primary | ICD-10-CM

## 2023-08-16 DIAGNOSIS — I73.9 PVD (PERIPHERAL VASCULAR DISEASE) WITH CLAUDICATION: ICD-10-CM

## 2023-08-16 PROCEDURE — G0463 HOSPITAL OUTPT CLINIC VISIT: HCPCS

## 2023-08-16 NOTE — OUTREACH NOTE
Medical Week 2 Survey      Flowsheet Row Responses   [facility] patient discharged from? [place]   Does the patient have one of the following disease processes/diagnoses(primary or secondary)? Other   Week 2 attempt successful? No   Unsuccessful attempts Attempt 1            BIRGIT LEE - Registered Nurse

## 2023-08-16 NOTE — PATIENT INSTRUCTIONS
Lifestyle limiting claudication, waveforms suspicious for aorta-iliac disease, further imaging required to determine diagnosis and potential revascularization options.  Obtain CTA run off, follow up with Dr. Robles to review films and discuss options.

## 2023-08-17 ENCOUNTER — HOME CARE VISIT (OUTPATIENT)
Dept: HOME HEALTH SERVICES | Facility: CLINIC | Age: 63
End: 2023-08-17
Payer: MEDICAID

## 2023-08-17 VITALS
RESPIRATION RATE: 16 BRPM | HEART RATE: 78 BPM | DIASTOLIC BLOOD PRESSURE: 80 MMHG | SYSTOLIC BLOOD PRESSURE: 114 MMHG | OXYGEN SATURATION: 93 % | TEMPERATURE: 97.4 F

## 2023-08-17 PROCEDURE — G0299 HHS/HOSPICE OF RN EA 15 MIN: HCPCS

## 2023-08-17 NOTE — PROGRESS NOTES
CVTS Office Progress Note     8/17/2023    Yamileth Slater  1960    Chief Complaint:  PVD    HPI:      PCP:  Kiersten Wilson APRN  Cardiology: Dr. Davis  Nephrology:  Dr. Jacobo, Monday Wednesday Friday dialysis   Vascular (AVF): Dr. Wade  Podiatry: Dr. Oliveira    63 y.o. female with HTN(stable, increased risk stroke, rupture), Hyperlipidemia(stable, increased risk cardiovascular events), Diabetes Mellitus(stable, increased risk cardiovascular events), Obesity(uncontrolled, increased risk cardiovascular events) and Chronic Kidney Disease(stable, increased risk renal failure), COPD with chronic, O2 use over the last 3 years, coronary artery disease multiple interventions, peripheral vascular disease multiple distal intervention, poorly controlled diabetes with diabetic foot infections she is status post transmit, delayed wound healing, history of MRSA CRE, on dialysis, poor follow-up.  former smoker.  Quit 1999.   Patient is medically complex, multiple hospitalizations over the past few years.  Multiple hospital admissions related to diabetic foot infections was transferred to [facility] in [facility] for infectious disease evaluation.  She subsequently underwent vascular evaluation and bilateral lower extremity arteriogram with intervention to both distal SFA and popliteal, she also went left transmetatarsal amputation at that time.  Surgical site dehiscence of left transmit site has had prolonged wound care with poor healing eventually required L BK which has healed performed in 12/2022.  We previously saw patient last year and determined no further revascularization options of LLE.  She now has gangrenous digit of the right toe which will require intervention.  Reports today with SUNDEEP.  Follows with Dr. Wade for AV fistula work.  Right foot numb which is chronic.      2013 CABG x3  2014 attempted left carotid endarterectomy, procedure aborted unable to find carotid (  [facility]livier)  5/2014 L Transfemoral stent (Dr. Robles)  2014 arteriogram: PTA right SFA stent (Dr. Robles)  11/2021 LHC: Distal left main ostial circumflex stenosis, patent LIMA to LAD, 100% occluded RCA with occluded SVG to RCA  11/2021 PTA stent distal left main [facility]    1/2022 tunnel cath placement    9/15/2022 bilateral lower extremity arteriogram: PTA proximal SFA, PTA and stent stenosis right SFA, PTA/stent right popliteal.  Left SFA PTA stent Olevia 6 x 40 mm 6 x 120 mm due to dissection, in stent stenosis post intervention [facility]  9/19/2022 left transmetatarsal amputation [facility]  12/2022 LEFT BKA Dr. White    12/2022 [facility]EP: Right 0.55 biphasic.  Left 0.5 triphasic femoral, biphasic popliteal, monophasic distally.  Limited evaluation of waveforms secondary to heart rhythm and body habitus  8/2023 SUNDEEP: RIGHT Monophasic, absent DP    3/2023 RIGHT BVT (Dr. Wade)    The following portions of the patient's history were reviewed and updated as appropriate: allergies, current medications, past family history, past medical history, past social history, past surgical history and problem list.  Recent images independently reviewed.  Available laboratory values reviewed.    PMH:  Past Medical History:   Diagnosis Date    Acute blood loss anemia 04/16/2017    Likely due to gastric oozing at this time. - Dr. Duarte (GI) was consulted and has now signed off, will follow up outpatient - pill colonoscopy showed AVMs - continue to monitor    Acute cystitis with hematuria 03/31/2021 1/13: IV Rocephin 1 gm q 24 1/14 : transitioned  to omnicef 300mg. Urine cultures resulted and did not show growth. Omnicef discontinued as patient is asymptomatic    Altered mental status 01/09/2022    - AMS on presentation - initial ABG pH 7.3, CO2 34 - Procal 0.29 - UA negative for acute cysitits -CTA head wnl  - Empiric Zosyn and Vancomycin -Lactate 2.5 on admission  - blood cultures no growth at 24 hours       Anxiety     CAD (coronary artery disease) 04/24/2021    S/P 3 stents 5/1/2021 for [facility] Continue ASA 81mg & Clopidogrel 75mg Continue Atorvastatin 40mg    Carotid artery stenosis     CHF (congestive heart failure)     Chronic obstructive lung disease     CKD (chronic kidney disease) stage 4, GFR 15-29 ml/min     CKD (chronic kidney disease), symptom management only, stage 5 10/05/2020    Results from last 7 days Lab Units 12/15/21 0548 12/14/21 1323 12/14/21 0916 CREATININE mg/dL 3.92* 3.21* 3.32*  Baseline creatinine 2-3 GFR 13-25 GFR 15 Dialysis MWF, sees Dr. Lauren Nephrology consult,, appreciate recommendations Continue Bumex 1mg bid daily Holding Bumex 2mg 4 times a week    Colonic polyp     Coronary arteriosclerosis     Diabetes mellitus     Diabetic neuropathy     Ear pain, right 10/18/2021    - canal trauma due to patient scratching and DMT2 - added cortisporin ear drops    Elevated troponin 10/12/2021    -most likely from CKD -Trending down -Neg chest pain    Generalized abdominal pain 07/01/2022    Could be due to initiation of tube feeds vs dyspepsia vs abdominal cramps related to no PO intake due to intubation vs constipation Continue current laxative regiment  If no bowel movement by this afternoon will consider enema    GERD (gastroesophageal reflux disease)     GI bleed 05/13/2021    - GI will follow up outpatient - Protonix 40mg daily - Avoid medical DVT prophy and use mechanical at this time instead. - Continue to monitor - pill colonoscopy results showed AVMs    History of transfusion     Hypercholesterolemia     Hyperosmolar hyperglycemic state (HHS) 06/25/2022    Serum glucose 605 on admission  Anion gap 16 PH 7.37 Bicarb 27.9 Continue fluids  Insulin drip with [facility] protocol  Anion gap closed around 10 AM, received one dose of Levamir subq, will stop insulin drip after 2 hrs      Hypertension     Hypokalemia 05/27/2022    Will replace as needed. Will be cautious in the setting of ESRD to avoid need  for emergency dialysis   Monitor Qtc intervals on EKG      Hypomagnesemia 06/27/2021    Monitor and replace    Morbid obesity     Nephrolithiasis     On mechanically assisted ventilation 06/26/2022    Vent management and sedation orders placed.  - [facility] intensivist group consulted for vent management appreciate recommendations  - plan to extubate today      Peripheral vascular disease     Pleural effusion on right 06/26/2022    CXR on 6/30/22 read as a small upper left pulmonary edema vs early pneumonia.  Last Echo 1/2022 EF 61-65 % Continue to monitor  Procal slightly improved, CRP improved On Linezolid and meropenum       PVD (peripheral vascular disease)     SIRS (systemic inflammatory response syndrome) 01/09/2022    Admission  - WBC 17.78   -   - RR 16 - 1/10: VSS/wnl - CXR - Mild pulm edema - Blood cultures no growth at 24 hours  - Procalcitonin 0.29 - UA : glucose 1000, negative Leucocytes/nitrate - Empiric Zosyn and Vancomcyin     Sleep apnea     Substance abuse     Vitamin D deficiency      Past Surgical History:   Procedure Laterality Date    AMPUTATION DIGIT Right 2/27/2023    Procedure: Excision of right first metatarsal head, right second toe amputation;  Surgeon: Jean Oliveira DPM;  Location: [facility] OR;  Service: Podiatry;  Laterality: Right;    BELOW KNEE AMPUTATION Left 12/16/2022    Procedure: AMPUTATION BELOW KNEE, LEFT;  Surgeon: Huy White MD;  Location: [facility] OR;  Service: Orthopedics;  Laterality: Left;    CARDIAC CATHETERIZATION N/A 07/14/2020    CARDIAC CATHETERIZATION N/A 04/23/2021    Procedure: Left Heart Cath;  Surgeon: Melba Romo MD;  Location: [facility] CATH INVASIVE LOCATION;  Service: Cardiology;  Laterality: N/A;    CARDIAC CATHETERIZATION N/A 04/30/2021    Procedure: Percutaneous Coronary Intervention;  Surgeon: Russell Voss MD;  Location: [facility] CATH INVASIVE LOCATION;  Service: Cardiovascular;  Laterality: N/A;    CARDIAC  CATHETERIZATION N/A 04/30/2021    Procedure: Stent NIKKI coronary;  Surgeon: Russell Voss MD;  Location: [facility] CATH INVASIVE LOCATION;  Service: Cardiovascular;  Laterality: N/A;    CARDIAC CATHETERIZATION Left 11/13/2021    Procedure: Left Heart Cath;  Surgeon: Niall Rios MD;  Location: [facility] CATH INVASIVE LOCATION;  Service: Cardiology;  Laterality: Left;    CAROTID STENT Left     COLONOSCOPY      COLONOSCOPY N/A 05/14/2021    Procedure: COLONOSCOPY;  Surgeon: Mingo Duarte MD;  Location: [facility] ENDOSCOPY;  Service: Gastroenterology;  Laterality: N/A;    CORONARY ARTERY BYPASS GRAFT N/A 2013    CABG X 3    CYSTOSCOPY BLADDER STONE LITHOTRIPSY Bilateral     ENDOSCOPY N/A 04/12/2021    Procedure: ESOPHAGOGASTRODUODENOSCOPY;  Surgeon: Mingo Duarte MD;  Location: [facility] ENDOSCOPY;  Service: Gastroenterology;  Laterality: N/A;    ENDOSCOPY N/A 05/14/2021    Procedure: ESOPHAGOGASTRODUODENOSCOPY;  Surgeon: Mingo Duarte MD;  Location: [facility] ENDOSCOPY;  Service: Gastroenterology;  Laterality: N/A;    ENDOSCOPY N/A 01/28/2022    Procedure: ESOPHAGOGASTRODUODENOSCOPY;  Surgeon: Mingo Duarte MD;  Location: [facility] ENDOSCOPY;  Service: Gastroenterology;  Laterality: N/A;    HYSTERECTOMY      INTERVENTIONAL RADIOLOGY PROCEDURE N/A 10/21/2021    Procedure: tunneled central venous catheter placement;  Surgeon: Donnie Robles MD;  Location: [facility] ANGIO INVASIVE LOCATION;  Service: Interventional Radiology;  Laterality: N/A;    INTERVENTIONAL RADIOLOGY PROCEDURE N/A 01/27/2022    Procedure: tunneled central venous catheter placement;  Surgeon: Donnie Robles MD;  Location: [facility] INVASIVE LOCATION;  Service: Interventional Radiology;  Laterality: N/A;    LAPAROSCOPIC TUBAL LIGATION      TUNNELED VENOUS CATHETER PLACEMENT       Family History   Problem Relation Age of Onset    Heart disease Mother     Lung cancer Mother     Heart disease Father     Heart attack Father      Diabetes Father     Heart disease Half-Sister         Dad's side    Heart disease Brother     No Known Problems Sister     No Known Problems Sister     No Known Problems Sister     No Known Problems Sister     No Known Problems Sister     Pancreatic cancer Half-Sister         Dad's side    No Known Problems Brother     No Known Problems Brother     Heart attack Half-Brother     Heart attack Half-Brother     No Known Problems Maternal Grandmother     No Known Problems Maternal Grandfather     No Known Problems Paternal Grandmother     No Known Problems Paternal Grandfather      Social History     Tobacco Use    Smoking status: Some Days     Packs/day: 0.25     Years: 46.00     Pack years: 11.50     Types: Cigarettes     Start date: 2/1/1999    Smokeless tobacco: Never   Vaping Use    Vaping Use: Every day    Substances: Nicotine    Devices: Disposable   Substance Use Topics    Alcohol use: No    Drug use: Not Currently     Types: Marijuana       ALLERGIES:  Allergies   Allergen Reactions    Adhesive Tape Hives, Other (See Comments) and Rash    Nsaids Hives    Latex Other (See Comments) and Hives    Other Itching     Bandaids, MRSA, SURECLOSE    Wound Dressing Adhesive Hives and Rash         MEDICATIONS:    Current Outpatient Medications:     albuterol (PROVENTIL) (2.5 MG/3ML) 0.083% nebulizer solution, Inhale the contents of 1 vial by nebulization Every 4 (Four) Hours As Needed for Wheezing., Disp: 75 mL, Rfl: 3    albuterol sulfate  (90 Base) MCG/ACT inhaler, Inhale 2 puffs Every 4 (Four) Hours As Needed for Wheezing., Disp: 18 g, Rfl: 1    atorvastatin (LIPITOR) 40 MG tablet, Take 1 tablet by mouth Daily. Indications: High Amount of Fats in the Blood, Disp: , Rfl:     Blood Glucose Monitoring Suppl ([employer] Blood Glucose Meter) w/Device kit, 1 each 3 (Three) Times a Day., Disp: 1 kit, Rfl: 3    carvedilol (COREG) 6.25 MG tablet, Take 1 tablet by mouth 2 (Two) Times a Day With Meals., Disp: , Rfl:      "Cetirizine HCl 10 MG capsule, Take 1 capsule by mouth Daily. Indications: Hayfever, Disp: , Rfl:     clopidogrel (PLAVIX) 75 MG tablet, Take 1 tablet by mouth Daily. (Patient taking differently: Take 1 tablet by mouth Daily.), Disp: 30 tablet, Rfl: 5    cyclobenzaprine (FLEXERIL) 5 MG tablet, Take 1 tablet by mouth 2 (Two) Times a Day. Indications: Muscle Spasm, Disp: , Rfl:     Diclofenac Sodium (VOLTAREN) 1 % gel gel, Apply 4 g topically to the appropriate area as directed 4 (Four) Times a Day As Needed (Ankle pain)., Disp: 350 g, Rfl: 2    docusate sodium (COLACE) 100 MG capsule, Take 1 capsule by mouth 2 (Two) Times a Day. Indications: Constipation, Disp: , Rfl:     DULoxetine (CYMBALTA) 20 MG capsule, Take 1 capsule by mouth Daily. Indications: Disease of the Peripheral Nerves, Disp: , Rfl:     Easy Touch Insulin Syringe 30G X 5/16\" 0.5 ML misc, USE AS DIRECTED WITH LEVEMIR, Disp: , Rfl:     EASY TOUCH PEN NEEDLES 31G X 8 MM misc, , Disp: , Rfl:     furosemide (LASIX) 20 MG tablet, Take 1 tablet by mouth Daily. Indications: Edema, Disp: , Rfl:     hydrOXYzine (ATARAX) 25 MG tablet, Take 1 tablet by mouth Every 8 (Eight) Hours As Needed for Itching. Indications: Itching, Disp: , Rfl:     insulin detemir (LEVEMIR) 100 UNIT/ML injection, Inject 24 Units under the skin into the appropriate area as directed 2 (Two) Times a Day. pt takes in the am and pm  Indications: T2DM, Disp: , Rfl:     Insulin Lispro, 1 Unit Dial, (HUMALOG) 100 UNIT/ML solution pen-injector, Inject 14-35 Units under the skin into the appropriate area as directed 3 (Three) Times a Day With Meals. Takes 14 units with meals plus sliding scale Sliding scale:  150-199 take 4 units 200-249 take 8 units 250-299 take 12 units 300-349 take 16 units 350-400 take 20 units greater than 400, call physician, Disp: , Rfl:     Insulin Pen Needle (NovoFine) 30G X 8 MM misc, As directed 4 times daily, Disp: 100 each, Rfl: 11    Insulin Pen Needle 31G X 8 MM misc, " Use to inject insulin 4 (Four) Times a Day as directed., Disp: 120 each, Rfl: 12    ipratropium-albuterol (DUO-NEB) 0.5-2.5 mg/3 ml nebulizer, Take 3 mL by nebulization 4 (Four) Times a Day As Needed. Indications: Chronic Obstructive Lung Disease, Disp: , Rfl:     losartan (COZAAR) 25 MG tablet, Take 1 tablet by mouth Daily., Disp: , Rfl:     memantine (NAMENDA) 5 MG tablet, Take 1 tablet by mouth 2 (Two) Times a Day. Indications: Cognitive Dysfunction, Disp: , Rfl:     nitroglycerin (NITROSTAT) 0.4 MG SL tablet, Place 1 tablet under the tongue Every 5 (Five) Minutes As Needed for Chest Pain (x 3 doses). Indications: Acute Angina Pectoris, Disp: , Rfl:     O2 (OXYGEN), Inhale 2 L/min Continuous. Only uses when patient takes Trilogy  Indications: SOA, Disp: , Rfl:     ondansetron (ZOFRAN) 4 MG tablet, Take 1 tablet by mouth Every 6 (Six) Hours As Needed for Nausea or Vomiting., Disp: , Rfl:     oxyCODONE (ROXICODONE) 10 MG tablet, Take 1 tablet by mouth 3 (Three) Times a Day. Indications: Acute Pain, Chronic Pain, Disp: , Rfl:     pantoprazole (PROTONIX) 40 MG EC tablet, Take 1 tablet by mouth Every Night., Disp: 90 tablet, Rfl: 0    promethazine (PHENERGAN) 25 MG tablet, Take 1 tablet by mouth Every 6 (Six) Hours As Needed for Nausea or Vomiting. Indications: Nausea and Vomiting, Disp: , Rfl:     sevelamer (RENVELA) 800 MG tablet, Take 1 tablet by mouth 3 (Three) Times a Day With Meals. Indications: High Amount of Phosphate in the Blood, Disp: , Rfl:     Vitamin D, Ergocalciferol, 26488 units capsule, Take 1 capsule by mouth 1 (One) Time Per Week. Take on Wednesday  Indications: Kidney Failure Syndrome, Disp: , Rfl:     gabapentin (NEURONTIN) 300 MG capsule, Take 1 capsule by mouth Daily for 3 days., Disp: 3 capsule, Rfl: 0      Review of Systems   Constitutional: Positive for malaise/fatigue. Negative for chills, decreased appetite and fever.   HENT:  Negative for congestion, nosebleeds and sore throat.    Eyes:   "Negative for blurred vision, visual disturbance and visual halos.   Cardiovascular:  Positive for dyspnea on exertion. Negative for chest pain and leg swelling.   Respiratory:  Positive for cough. Negative for shortness of breath, sputum production and wheezing.    Endocrine: Negative for cold intolerance and polyuria.   Hematologic/Lymphatic: Negative for bleeding problem. Bruises/bleeds easily.   Skin:  Positive for color change, poor wound healing and unusual hair distribution. Negative for flushing and nail changes.   Musculoskeletal:  Positive for arthritis, back pain and joint pain.   Gastrointestinal:  Negative for bloating, abdominal pain, hematemesis, melena, nausea and vomiting.   Genitourinary:  Negative for flank pain and hematuria.   Neurological:  Positive for numbness (right foot) and weakness. Negative for brief paralysis, difficulty with concentration, focal weakness, light-headedness and paresthesias.   Psychiatric/Behavioral:  Negative for altered mental status, depression, substance abuse and suicidal ideas.    Allergic/Immunologic: Negative for hives and persistent infections.       Vitals:    08/16/23 1513   BP: 132/70   BP Location: Left arm   Pulse: 62   SpO2: 96%   Height: 157.5 cm (62\")     Physical Exam  Constitutional:       Appearance: She is obese. She is ill-appearing.   HENT:      Nose: Nose normal.      Mouth/Throat:      Mouth: Mucous membranes are moist.   Eyes:      Pupils: Pupils are equal, round, and reactive to light.   Cardiovascular:      Rate and Rhythm: Normal rate.      Pulses:           Dorsalis pedis pulses are 0 on the right side.        Posterior tibial pulses are detected w/ Doppler on the right side.   Pulmonary:      Effort: Pulmonary effort is normal.      Breath sounds: Normal breath sounds.   Abdominal:      General: Abdomen is flat.      Palpations: Abdomen is soft.   Musculoskeletal:      Right lower leg: No edema.      Comments: L BKA   Skin:     Capillary " Refill: Capillary refill takes more than 3 seconds.      Comments: R foot in wrap   Neurological:      Mental Status: She is alert. Mental status is at baseline.   Psychiatric:         Mood and Affect: Mood normal.       Assessment & Plan     Independent Review of Studies    1. PVD (peripheral vascular disease) with claudication (HCC)  Critical reduction in right lower extremity perfusion.  No acute symptoms or rest pain.  She is wheel chair bound.    Perfusion worse in comparison to perfusion studies obtained last year.    Waveforms are suspicious for inflow disease will require CTA runoff to determine if any options for revascularization exist.  She has known severe distal diabetic angiopathy with poor outflow with multiple prior interventions as documented above.    Aspirin, statin, Plavix    2. Mixed hyperlipidemia  Lipid-lowering therapy has been proven beneficial in patients with cardio-vascular disease. Current guidelines recommend statin treatment for all patients with PAD,CAD and carotid stenosis. Statins are beneficial in preventing cardiovascular events, increasing functional capacity and lower the risk of adverse limb loss in PAD. Statins decrease the progression of plaque formation and may improve peripheral vessel lining, and aid in reversing atherosclerosis.    3. Carotid stenosis, asymptomatic, bilateral  Prior left ICA stenting transfemoral approach back in 2014.  She is lost to follow-up we will plan to bring her back with carotid ultrasound next available.    Remains on dual antiplatelet therapy    4. RIGHT toe Gangrene  WC per podiatry.     5. Type 2 diabetes mellitus with diabetic peripheral angiopathy with gangrene, unspecified whether long term insulin use (HCC)  Lab Results   Component Value Date    HGBA1C 5.20 07/29/2023     Current evidence recommend target goal of A1C <7.0 to reduce risk of microvascular complications. Uncontrolled glucose in the PAD patient increases the risk of diabetic  angiopathy and risk of limb loss.  This was discussed at length with patient.  Continued follow up with PCP and/or endocrinology for management is recommended.     Improved glucose control.     6. ESRD on dialysis  Hemodialysis as scheduled via AVF follow up with Dr. Wade for routine surveillance.     Detailed discussion regarding risks, benefits, and treatment plan. Images independently reviewed. Patient understands, agrees, and wishes to proceed with plan.       This document has been electronically signed by JONNY Jovel-BC @  On August 17, 2023 09:16 CDT

## 2023-08-20 ENCOUNTER — HOME CARE VISIT (OUTPATIENT)
Dept: HOME HEALTH SERVICES | Facility: CLINIC | Age: 63
End: 2023-08-20
Payer: MEDICAID

## 2023-08-20 PROCEDURE — G0299 HHS/HOSPICE OF RN EA 15 MIN: HCPCS

## 2023-08-22 VITALS
RESPIRATION RATE: 18 BRPM | HEART RATE: 70 BPM | OXYGEN SATURATION: 95 % | SYSTOLIC BLOOD PRESSURE: 118 MMHG | DIASTOLIC BLOOD PRESSURE: 60 MMHG | TEMPERATURE: 98.2 F

## 2023-08-23 ENCOUNTER — OFFICE VISIT (OUTPATIENT)
Dept: WOUND CARE | Facility: HOSPITAL | Age: 63
End: 2023-08-23
Payer: MEDICARE

## 2023-08-23 ENCOUNTER — OUTSIDE FACILITY SERVICE (OUTPATIENT)
Dept: WOUND CARE | Facility: HOSPITAL | Age: 63
End: 2023-08-23
Payer: MEDICARE

## 2023-08-23 PROCEDURE — G0463 HOSPITAL OUTPT CLINIC VISIT: HCPCS

## 2023-08-25 ENCOUNTER — HOME CARE VISIT (OUTPATIENT)
Dept: HOME HEALTH SERVICES | Facility: CLINIC | Age: 63
End: 2023-08-25
Payer: MEDICAID

## 2023-08-25 VITALS
OXYGEN SATURATION: 99 % | HEART RATE: 67 BPM | SYSTOLIC BLOOD PRESSURE: 150 MMHG | RESPIRATION RATE: 16 BRPM | DIASTOLIC BLOOD PRESSURE: 88 MMHG | TEMPERATURE: 97.3 F

## 2023-08-25 PROCEDURE — G0299 HHS/HOSPICE OF RN EA 15 MIN: HCPCS

## 2023-08-26 ENCOUNTER — APPOINTMENT (OUTPATIENT)
Dept: CT IMAGING | Facility: HOSPITAL | Age: 63
DRG: 640 | End: 2023-08-26
Payer: MEDICARE

## 2023-08-26 ENCOUNTER — HOSPITAL ENCOUNTER (INPATIENT)
Facility: HOSPITAL | Age: 63
LOS: 2 days | Discharge: HOME | DRG: 640 | End: 2023-08-28
Attending: STUDENT IN AN ORGANIZED HEALTH CARE EDUCATION/TRAINING PROGRAM
Payer: MEDICARE

## 2023-08-26 ENCOUNTER — HOME CARE VISIT (OUTPATIENT)
Dept: HOME HEALTH SERVICES | Facility: HOME HEALTHCARE | Age: 63
End: 2023-08-26
Payer: COMMERCIAL

## 2023-08-26 ENCOUNTER — APPOINTMENT (OUTPATIENT)
Dept: GENERAL RADIOLOGY | Facility: HOSPITAL | Age: 63
DRG: 640 | End: 2023-08-26
Payer: MEDICAID

## 2023-08-26 DIAGNOSIS — Z99.2: ICD-10-CM

## 2023-08-26 DIAGNOSIS — E87.5 HYPERKALEMIA: Primary | ICD-10-CM

## 2023-08-26 DIAGNOSIS — R53.81 PHYSICAL DECONDITIONING: ICD-10-CM

## 2023-08-26 LAB
ALBUMIN SERPL-MCNC: 3.5 G/DL (ref 3.5–5.2)
ALBUMIN SERPL-MCNC: 3.7 G/DL (ref 3.5–5.2)
ALBUMIN/GLOB SERPL: 0.7 G/DL
ALBUMIN/GLOB SERPL: 0.8 G/DL
ALP SERPL-CCNC: 212 U/L (ref 39–117)
ALP SERPL-CCNC: 220 U/L (ref 39–117)
ALT SERPL W P-5'-P-CCNC: 10 U/L (ref 1–33)
ALT SERPL W P-5'-P-CCNC: 9 U/L (ref 1–33)
ANION GAP SERPL CALCULATED.3IONS-SCNC: 14 MMOL/L (ref 5–15)
ANION GAP SERPL CALCULATED.3IONS-SCNC: 15 MMOL/L (ref 5–15)
AST SERPL-CCNC: 14 U/L (ref 1–32)
AST SERPL-CCNC: 20 U/L (ref 1–32)
BASOPHILS # BLD AUTO: 0.1 10*3/MM3 (ref 0–0.2)
BASOPHILS NFR BLD AUTO: 1.1 % (ref 0–1.5)
BILIRUB SERPL-MCNC: 0.4 MG/DL (ref 0–1.2)
BILIRUB SERPL-MCNC: 0.5 MG/DL (ref 0–1.2)
BUN SERPL-MCNC: 42 MG/DL (ref 8–23)
BUN SERPL-MCNC: 73 MG/DL (ref 8–23)
BUN/CREAT SERPL: 10.8 (ref 7–25)
BUN/CREAT SERPL: 9.5 (ref 7–25)
CALCIUM SPEC-SCNC: 9.1 MG/DL (ref 8.6–10.5)
CALCIUM SPEC-SCNC: 9.7 MG/DL (ref 8.6–10.5)
CHLORIDE SERPL-SCNC: 101 MMOL/L (ref 98–107)
CHLORIDE SERPL-SCNC: 97 MMOL/L (ref 98–107)
CO2 SERPL-SCNC: 18 MMOL/L (ref 22–29)
CO2 SERPL-SCNC: 21 MMOL/L (ref 22–29)
CREAT SERPL-MCNC: 4.44 MG/DL (ref 0.57–1)
CREAT SERPL-MCNC: 6.75 MG/DL (ref 0.57–1)
DEPRECATED RDW RBC AUTO: 54.4 FL (ref 37–54)
EGFRCR SERPLBLD CKD-EPI 2021: 10.5 ML/MIN/1.73
EGFRCR SERPLBLD CKD-EPI 2021: 6.4 ML/MIN/1.73
EOSINOPHIL # BLD AUTO: 0.18 10*3/MM3 (ref 0–0.4)
EOSINOPHIL NFR BLD AUTO: 1.9 % (ref 0.3–6.2)
ERYTHROCYTE [DISTWIDTH] IN BLOOD BY AUTOMATED COUNT: 15.9 % (ref 12.3–15.4)
GLOBULIN UR ELPH-MCNC: 4.7 GM/DL
GLOBULIN UR ELPH-MCNC: 4.8 GM/DL
GLUCOSE BLDC GLUCOMTR-MCNC: 176 MG/DL (ref 70–130)
GLUCOSE SERPL-MCNC: 109 MG/DL (ref 65–99)
GLUCOSE SERPL-MCNC: 135 MG/DL (ref 65–99)
HCT VFR BLD AUTO: 30.2 % (ref 34–46.6)
HGB BLD-MCNC: 9.8 G/DL (ref 12–15.9)
IMM GRANULOCYTES # BLD AUTO: 0.04 10*3/MM3 (ref 0–0.05)
IMM GRANULOCYTES NFR BLD AUTO: 0.4 % (ref 0–0.5)
LYMPHOCYTES # BLD AUTO: 2.45 10*3/MM3 (ref 0.7–3.1)
LYMPHOCYTES NFR BLD AUTO: 26.2 % (ref 19.6–45.3)
MAGNESIUM SERPL-MCNC: 2.6 MG/DL (ref 1.6–2.4)
MCH RBC QN AUTO: 30.2 PG (ref 26.6–33)
MCHC RBC AUTO-ENTMCNC: 32.5 G/DL (ref 31.5–35.7)
MCV RBC AUTO: 93.2 FL (ref 79–97)
MONOCYTES # BLD AUTO: 0.49 10*3/MM3 (ref 0.1–0.9)
MONOCYTES NFR BLD AUTO: 5.2 % (ref 5–12)
NEUTROPHILS NFR BLD AUTO: 6.1 10*3/MM3 (ref 1.7–7)
NEUTROPHILS NFR BLD AUTO: 65.2 % (ref 42.7–76)
NRBC BLD AUTO-RTO: 0 /100 WBC (ref 0–0.2)
NT-PROBNP SERPL-MCNC: 3471 PG/ML (ref 0–900)
PLATELET # BLD AUTO: 164 10*3/MM3 (ref 140–450)
PMV BLD AUTO: 8.8 FL (ref 6–12)
POTASSIUM SERPL-SCNC: 5.1 MMOL/L (ref 3.5–5.2)
POTASSIUM SERPL-SCNC: 8.8 MMOL/L (ref 3.5–5.2)
PROT SERPL-MCNC: 8.3 G/DL (ref 6–8.5)
PROT SERPL-MCNC: 8.4 G/DL (ref 6–8.5)
QT INTERVAL: 510 MS
QT INTERVAL: 554 MS
QTC INTERVAL: 494 MS
QTC INTERVAL: 538 MS
RBC # BLD AUTO: 3.24 10*6/MM3 (ref 3.77–5.28)
SODIUM SERPL-SCNC: 133 MMOL/L (ref 136–145)
SODIUM SERPL-SCNC: 133 MMOL/L (ref 136–145)
WBC NRBC COR # BLD: 9.36 10*3/MM3 (ref 3.4–10.8)

## 2023-08-26 PROCEDURE — 93005 ELECTROCARDIOGRAM TRACING: CPT

## 2023-08-26 PROCEDURE — 63710000001 INSULIN REGULAR HUMAN PER 5 UNITS: Performed by: STUDENT IN AN ORGANIZED HEALTH CARE EDUCATION/TRAINING PROGRAM

## 2023-08-26 PROCEDURE — 94799 UNLISTED PULMONARY SVC/PX: CPT

## 2023-08-26 PROCEDURE — 63710000001 INSULIN DETEMIR PER 5 UNITS

## 2023-08-26 PROCEDURE — 85025 COMPLETE CBC W/AUTO DIFF WBC: CPT | Performed by: STUDENT IN AN ORGANIZED HEALTH CARE EDUCATION/TRAINING PROGRAM

## 2023-08-26 PROCEDURE — 93005 ELECTROCARDIOGRAM TRACING: CPT | Performed by: STUDENT IN AN ORGANIZED HEALTH CARE EDUCATION/TRAINING PROGRAM

## 2023-08-26 PROCEDURE — 83880 ASSAY OF NATRIURETIC PEPTIDE: CPT | Performed by: STUDENT IN AN ORGANIZED HEALTH CARE EDUCATION/TRAINING PROGRAM

## 2023-08-26 PROCEDURE — 25010000002 CALCIUM GLUCONATE-NACL 1-0.8 GM/100ML-% SOLUTION: Performed by: STUDENT IN AN ORGANIZED HEALTH CARE EDUCATION/TRAINING PROGRAM

## 2023-08-26 PROCEDURE — 99285 EMERGENCY DEPT VISIT HI MDM: CPT

## 2023-08-26 PROCEDURE — 25010000002 HEPARIN (PORCINE) PER 1000 UNITS

## 2023-08-26 PROCEDURE — 36415 COLL VENOUS BLD VENIPUNCTURE: CPT

## 2023-08-26 PROCEDURE — 82948 REAGENT STRIP/BLOOD GLUCOSE: CPT

## 2023-08-26 PROCEDURE — 71045 X-RAY EXAM CHEST 1 VIEW: CPT

## 2023-08-26 PROCEDURE — 5A1D70Z PERFORMANCE OF URINARY FILTRATION, INTERMITTENT, LESS THAN 6 HOURS PER DAY: ICD-10-PCS | Performed by: INTERNAL MEDICINE

## 2023-08-26 PROCEDURE — 93010 ELECTROCARDIOGRAM REPORT: CPT | Performed by: INTERNAL MEDICINE

## 2023-08-26 PROCEDURE — 94640 AIRWAY INHALATION TREATMENT: CPT

## 2023-08-26 PROCEDURE — 80053 COMPREHEN METABOLIC PANEL: CPT | Performed by: INTERNAL MEDICINE

## 2023-08-26 PROCEDURE — 71250 CT THORAX DX C-: CPT

## 2023-08-26 PROCEDURE — 83735 ASSAY OF MAGNESIUM: CPT | Performed by: STUDENT IN AN ORGANIZED HEALTH CARE EDUCATION/TRAINING PROGRAM

## 2023-08-26 PROCEDURE — 80053 COMPREHEN METABOLIC PANEL: CPT | Performed by: STUDENT IN AN ORGANIZED HEALTH CARE EDUCATION/TRAINING PROGRAM

## 2023-08-26 RX ORDER — CALCIUM GLUCONATE 94 MG/ML
1 INJECTION, SOLUTION INTRAVENOUS ONCE
Status: DISCONTINUED | OUTPATIENT
Start: 2023-08-26 | End: 2023-08-26

## 2023-08-26 RX ORDER — DEXTROSE MONOHYDRATE 25 G/50ML
50 INJECTION, SOLUTION INTRAVENOUS
Status: DISCONTINUED | OUTPATIENT
Start: 2023-08-26 | End: 2023-08-28 | Stop reason: HOSPADM

## 2023-08-26 RX ORDER — ALBUTEROL SULFATE 2.5 MG/3ML
2.5 SOLUTION RESPIRATORY (INHALATION)
Status: ACTIVE | OUTPATIENT
Start: 2023-08-26 | End: 2023-08-26

## 2023-08-26 RX ORDER — DULOXETIN HYDROCHLORIDE 20 MG/1
20 CAPSULE, DELAYED RELEASE ORAL DAILY
Status: DISCONTINUED | OUTPATIENT
Start: 2023-08-27 | End: 2023-08-28 | Stop reason: HOSPADM

## 2023-08-26 RX ORDER — ONDANSETRON 4 MG/1
4 TABLET, FILM COATED ORAL EVERY 6 HOURS PRN
Status: DISCONTINUED | OUTPATIENT
Start: 2023-08-26 | End: 2023-08-28 | Stop reason: HOSPADM

## 2023-08-26 RX ORDER — SODIUM CHLORIDE 0.9 % (FLUSH) 0.9 %
10 SYRINGE (ML) INJECTION AS NEEDED
Status: DISCONTINUED | OUTPATIENT
Start: 2023-08-26 | End: 2023-08-28 | Stop reason: HOSPADM

## 2023-08-26 RX ORDER — IPRATROPIUM BROMIDE AND ALBUTEROL SULFATE 2.5; .5 MG/3ML; MG/3ML
3 SOLUTION RESPIRATORY (INHALATION) 4 TIMES DAILY PRN
Status: DISCONTINUED | OUTPATIENT
Start: 2023-08-26 | End: 2023-08-28 | Stop reason: HOSPADM

## 2023-08-26 RX ORDER — PANTOPRAZOLE SODIUM 40 MG/1
40 TABLET, DELAYED RELEASE ORAL NIGHTLY
Status: DISCONTINUED | OUTPATIENT
Start: 2023-08-26 | End: 2023-08-28 | Stop reason: HOSPADM

## 2023-08-26 RX ORDER — HEPARIN SODIUM 1000 [USP'U]/ML
2000 INJECTION, SOLUTION INTRAVENOUS; SUBCUTANEOUS AS NEEDED
Status: DISCONTINUED | OUTPATIENT
Start: 2023-08-26 | End: 2023-08-28 | Stop reason: HOSPADM

## 2023-08-26 RX ORDER — ONDANSETRON 4 MG/1
4 TABLET, FILM COATED ORAL EVERY 6 HOURS PRN
Status: DISCONTINUED | OUTPATIENT
Start: 2023-08-26 | End: 2023-08-26 | Stop reason: SDUPTHER

## 2023-08-26 RX ORDER — SEVELAMER CARBONATE 800 MG/1
800 TABLET, FILM COATED ORAL
Status: DISCONTINUED | OUTPATIENT
Start: 2023-08-26 | End: 2023-08-28 | Stop reason: HOSPADM

## 2023-08-26 RX ORDER — HEPARIN SODIUM 5000 [USP'U]/ML
5000 INJECTION, SOLUTION INTRAVENOUS; SUBCUTANEOUS EVERY 12 HOURS SCHEDULED
Status: DISCONTINUED | OUTPATIENT
Start: 2023-08-26 | End: 2023-08-28 | Stop reason: HOSPADM

## 2023-08-26 RX ORDER — NITROGLYCERIN 0.4 MG/1
0.4 TABLET SUBLINGUAL
Status: DISCONTINUED | OUTPATIENT
Start: 2023-08-26 | End: 2023-08-26 | Stop reason: SDUPTHER

## 2023-08-26 RX ORDER — SODIUM POLYSTYRENE SULFONATE 15 G/60ML
30 SUSPENSION ORAL; RECTAL ONCE
Status: COMPLETED | OUTPATIENT
Start: 2023-08-26 | End: 2023-08-26

## 2023-08-26 RX ORDER — ATORVASTATIN CALCIUM 40 MG/1
40 TABLET, FILM COATED ORAL DAILY
Status: DISCONTINUED | OUTPATIENT
Start: 2023-08-27 | End: 2023-08-28 | Stop reason: HOSPADM

## 2023-08-26 RX ORDER — CYCLOBENZAPRINE HCL 5 MG
5 TABLET ORAL 2 TIMES DAILY
Status: DISCONTINUED | OUTPATIENT
Start: 2023-08-26 | End: 2023-08-28 | Stop reason: HOSPADM

## 2023-08-26 RX ORDER — CALCIUM GLUCONATE 10 MG/ML
1000 INJECTION, SOLUTION INTRAVENOUS ONCE
Status: COMPLETED | OUTPATIENT
Start: 2023-08-26 | End: 2023-08-26

## 2023-08-26 RX ORDER — ALBUTEROL SULFATE 2.5 MG/3ML
2.5 SOLUTION RESPIRATORY (INHALATION) EVERY 6 HOURS PRN
Status: DISCONTINUED | OUTPATIENT
Start: 2023-08-26 | End: 2023-08-26

## 2023-08-26 RX ORDER — BISACODYL 10 MG
10 SUPPOSITORY, RECTAL RECTAL DAILY PRN
Status: DISCONTINUED | OUTPATIENT
Start: 2023-08-26 | End: 2023-08-28 | Stop reason: HOSPADM

## 2023-08-26 RX ORDER — AMOXICILLIN 250 MG
2 CAPSULE ORAL 2 TIMES DAILY
Status: DISCONTINUED | OUTPATIENT
Start: 2023-08-26 | End: 2023-08-28 | Stop reason: HOSPADM

## 2023-08-26 RX ORDER — SODIUM CHLORIDE 9 MG/ML
40 INJECTION, SOLUTION INTRAVENOUS AS NEEDED
Status: DISCONTINUED | OUTPATIENT
Start: 2023-08-26 | End: 2023-08-28 | Stop reason: HOSPADM

## 2023-08-26 RX ORDER — ALBUMIN (HUMAN) 12.5 G/50ML
12.5 SOLUTION INTRAVENOUS AS NEEDED
Status: ACTIVE | OUTPATIENT
Start: 2023-08-26 | End: 2023-08-27

## 2023-08-26 RX ORDER — BISACODYL 5 MG/1
5 TABLET, DELAYED RELEASE ORAL DAILY PRN
Status: DISCONTINUED | OUTPATIENT
Start: 2023-08-26 | End: 2023-08-28 | Stop reason: HOSPADM

## 2023-08-26 RX ORDER — CARVEDILOL 6.25 MG/1
6.25 TABLET ORAL 2 TIMES DAILY WITH MEALS
Status: DISCONTINUED | OUTPATIENT
Start: 2023-08-26 | End: 2023-08-27

## 2023-08-26 RX ORDER — CLOPIDOGREL BISULFATE 75 MG/1
75 TABLET ORAL DAILY
Status: DISCONTINUED | OUTPATIENT
Start: 2023-08-27 | End: 2023-08-28 | Stop reason: HOSPADM

## 2023-08-26 RX ORDER — ONDANSETRON 2 MG/ML
4 INJECTION INTRAMUSCULAR; INTRAVENOUS EVERY 6 HOURS PRN
Status: DISCONTINUED | OUTPATIENT
Start: 2023-08-26 | End: 2023-08-28 | Stop reason: HOSPADM

## 2023-08-26 RX ORDER — MEMANTINE HYDROCHLORIDE 5 MG/1
5 TABLET ORAL 2 TIMES DAILY
Status: DISCONTINUED | OUTPATIENT
Start: 2023-08-26 | End: 2023-08-28 | Stop reason: HOSPADM

## 2023-08-26 RX ORDER — DOCUSATE SODIUM 100 MG/1
100 CAPSULE, LIQUID FILLED ORAL 2 TIMES DAILY
Status: DISCONTINUED | OUTPATIENT
Start: 2023-08-26 | End: 2023-08-28 | Stop reason: HOSPADM

## 2023-08-26 RX ORDER — FUROSEMIDE 20 MG/1
20 TABLET ORAL DAILY
Status: DISCONTINUED | OUTPATIENT
Start: 2023-08-27 | End: 2023-08-28 | Stop reason: HOSPADM

## 2023-08-26 RX ORDER — LOSARTAN POTASSIUM 25 MG/1
25 TABLET ORAL DAILY
Status: DISCONTINUED | OUTPATIENT
Start: 2023-08-27 | End: 2023-08-27

## 2023-08-26 RX ORDER — NITROGLYCERIN 0.4 MG/1
0.4 TABLET SUBLINGUAL
Status: DISCONTINUED | OUTPATIENT
Start: 2023-08-26 | End: 2023-08-28 | Stop reason: HOSPADM

## 2023-08-26 RX ORDER — ALBUTEROL SULFATE 2.5 MG/3ML
2.5 SOLUTION RESPIRATORY (INHALATION) EVERY 4 HOURS PRN
Status: DISCONTINUED | OUTPATIENT
Start: 2023-08-26 | End: 2023-08-26 | Stop reason: SDUPTHER

## 2023-08-26 RX ORDER — OXYCODONE HYDROCHLORIDE 10 MG/1
10 TABLET ORAL 3 TIMES DAILY
Status: DISCONTINUED | OUTPATIENT
Start: 2023-08-26 | End: 2023-08-28 | Stop reason: HOSPADM

## 2023-08-26 RX ORDER — CETIRIZINE HYDROCHLORIDE 10 MG/1
10 TABLET ORAL NIGHTLY
Status: DISCONTINUED | OUTPATIENT
Start: 2023-08-26 | End: 2023-08-28 | Stop reason: HOSPADM

## 2023-08-26 RX ORDER — SODIUM CHLORIDE 0.9 % (FLUSH) 0.9 %
10 SYRINGE (ML) INJECTION EVERY 12 HOURS SCHEDULED
Status: DISCONTINUED | OUTPATIENT
Start: 2023-08-26 | End: 2023-08-28 | Stop reason: HOSPADM

## 2023-08-26 RX ORDER — PROMETHAZINE HYDROCHLORIDE 25 MG/1
25 TABLET ORAL EVERY 6 HOURS PRN
Status: DISCONTINUED | OUTPATIENT
Start: 2023-08-26 | End: 2023-08-28 | Stop reason: HOSPADM

## 2023-08-26 RX ORDER — POLYETHYLENE GLYCOL 3350 17 G/17G
17 POWDER, FOR SOLUTION ORAL DAILY PRN
Status: DISCONTINUED | OUTPATIENT
Start: 2023-08-26 | End: 2023-08-28 | Stop reason: HOSPADM

## 2023-08-26 RX ORDER — GABAPENTIN 300 MG/1
300 CAPSULE ORAL DAILY
Status: DISCONTINUED | OUTPATIENT
Start: 2023-08-26 | End: 2023-08-28 | Stop reason: HOSPADM

## 2023-08-26 RX ADMIN — DEXTROSE MONOHYDRATE 50 ML: 25 INJECTION, SOLUTION INTRAVENOUS at 13:01

## 2023-08-26 RX ADMIN — INSULIN DETEMIR 24 UNITS: 100 INJECTION, SOLUTION SUBCUTANEOUS at 20:59

## 2023-08-26 RX ADMIN — CYCLOBENZAPRINE 5 MG: 5 TABLET, FILM COATED ORAL at 20:59

## 2023-08-26 RX ADMIN — CETIRIZINE HYDROCHLORIDE 10 MG: 10 TABLET, FILM COATED ORAL at 20:59

## 2023-08-26 RX ADMIN — ALBUTEROL SULFATE 5 MG: 2.5 SOLUTION RESPIRATORY (INHALATION) at 13:04

## 2023-08-26 RX ADMIN — MEMANTINE 5 MG: 5 TABLET ORAL at 20:59

## 2023-08-26 RX ADMIN — PANTOPRAZOLE SODIUM 40 MG: 40 TABLET, DELAYED RELEASE ORAL at 20:59

## 2023-08-26 RX ADMIN — HUMAN INSULIN 10 UNITS: 100 INJECTION, SOLUTION SUBCUTANEOUS at 13:01

## 2023-08-26 RX ADMIN — ALBUTEROL SULFATE 2.5 MG: 2.5 SOLUTION RESPIRATORY (INHALATION) at 13:06

## 2023-08-26 RX ADMIN — SODIUM POLYSTYRENE SULFONATE 30 G: 15 SUSPENSION ORAL; RECTAL at 13:02

## 2023-08-26 RX ADMIN — CALCIUM GLUCONATE 1000 MG: 10 INJECTION, SOLUTION INTRAVENOUS at 13:02

## 2023-08-26 RX ADMIN — CARVEDILOL 6.25 MG: 6.25 TABLET, FILM COATED ORAL at 18:28

## 2023-08-26 RX ADMIN — HEPARIN SODIUM 5000 UNITS: 5000 INJECTION INTRAVENOUS; SUBCUTANEOUS at 20:59

## 2023-08-26 RX ADMIN — ALBUTEROL SULFATE 2.5 MG: 2.5 SOLUTION RESPIRATORY (INHALATION) at 13:11

## 2023-08-26 RX ADMIN — OXYCODONE HYDROCHLORIDE 10 MG: 10 TABLET ORAL at 20:59

## 2023-08-26 RX ADMIN — SEVELAMER CARBONATE 800 MG: 800 TABLET, FILM COATED ORAL at 18:29

## 2023-08-26 NOTE — ED PROVIDER NOTES
Subjective   History of Present Illness  Patient presents via EMS with complaints of shortness of breath, tremors, vomiting starting after she missed her dialysis on Thursday.  Patient is a Tuesday Thursday Saturday dialysis patient and her ride was unable to get her on Thursday and today, her ride for dialysis showed up at the same time as the ambulance and she opted to come to the ER instead to go to dialysis.  Patient did not take her blood pressure meds this morning as she was thinking she was going to dialysis.  She was placed on oxygen in route for comfort but on room air satting 94% during the exam.    Review of Systems   Constitutional:  Negative for activity change and appetite change.   HENT:  Negative for congestion and ear pain.    Eyes:  Negative for pain and discharge.   Respiratory:  Positive for shortness of breath. Negative for chest tightness.    Cardiovascular:  Negative for chest pain and palpitations.   Gastrointestinal:  Negative for abdominal distention and abdominal pain.   Endocrine: Negative for cold intolerance and heat intolerance.   Genitourinary:  Negative for difficulty urinating and dysuria.   Musculoskeletal:  Negative for arthralgias and back pain.   Skin:  Negative for color change and rash.   Allergic/Immunologic: Negative for environmental allergies and food allergies.   Neurological:  Positive for tremors. Negative for dizziness and headaches.   Hematological:  Negative for adenopathy. Does not bruise/bleed easily.   Psychiatric/Behavioral:  Negative for agitation and confusion.      Past Medical History:   Diagnosis Date    Acute blood loss anemia 04/16/2017    Likely due to gastric oozing at this time. - Dr. Duarte (GI) was consulted and has now signed off, will follow up outpatient - pill colonoscopy showed AVMs - continue to monitor    Acute cystitis with hematuria 03/31/2021 1/13: IV Rocephin 1 gm q 24 1/14 : transitioned  to omnicef 300mg. Urine cultures resulted and  did not show growth. Omnicef discontinued as patient is asymptomatic    Altered mental status 01/09/2022    - AMS on presentation - initial ABG pH 7.3, CO2 34 - Procal 0.29 - UA negative for acute cysitits -CTA head wnl  - Empiric Zosyn and Vancomycin -Lactate 2.5 on admission  - blood cultures no growth at 24 hours      Anxiety     CAD (coronary artery disease) 04/24/2021    S/P 3 stents 5/1/2021 for [facility] Continue ASA 81mg & Clopidogrel 75mg Continue Atorvastatin 40mg    Carotid artery stenosis     CHF (congestive heart failure)     Chronic obstructive lung disease     CKD (chronic kidney disease) stage 4, GFR 15-29 ml/min     CKD (chronic kidney disease), symptom management only, stage 5 10/05/2020    Results from last 7 days Lab Units 12/15/21 0548 12/14/21 1323 12/14/21 0916 CREATININE mg/dL 3.92* 3.21* 3.32*  Baseline creatinine 2-3 GFR 13-25 GFR 15 Dialysis MWF, sees Dr. Lauren Nephrology consult,, appreciate recommendations Continue Bumex 1mg bid daily Holding Bumex 2mg 4 times a week    Colonic polyp     Coronary arteriosclerosis     Diabetes mellitus     Diabetic neuropathy     Ear pain, right 10/18/2021    - canal trauma due to patient scratching and DMT2 - added cortisporin ear drops    Elevated troponin 10/12/2021    -most likely from CKD -Trending down -Neg chest pain    Generalized abdominal pain 07/01/2022    Could be due to initiation of tube feeds vs dyspepsia vs abdominal cramps related to no PO intake due to intubation vs constipation Continue current laxative regiment  If no bowel movement by this afternoon will consider enema    GERD (gastroesophageal reflux disease)     GI bleed 05/13/2021    - GI will follow up outpatient - Protonix 40mg daily - Avoid medical DVT prophy and use mechanical at this time instead. - Continue to monitor - pill colonoscopy results showed AVMs    History of transfusion     Hypercholesterolemia     Hyperosmolar hyperglycemic state (HHS) 06/25/2022    Serum glucose 605  on admission  Anion gap 16 PH 7.37 Bicarb 27.9 Continue fluids  Insulin drip with [facility] protocol  Anion gap closed around 10 AM, received one dose of Levamir subq, will stop insulin drip after 2 hrs      Hypertension     Hypokalemia 05/27/2022    Will replace as needed. Will be cautious in the setting of ESRD to avoid need for emergency dialysis   Monitor Qtc intervals on EKG      Hypomagnesemia 06/27/2021    Monitor and replace    Morbid obesity     Nephrolithiasis     On mechanically assisted ventilation 06/26/2022    Vent management and sedation orders placed.  - [facility] intensivist group consulted for vent management appreciate recommendations  - plan to extubate today      Peripheral vascular disease     Pleural effusion on right 06/26/2022    CXR on 6/30/22 read as a small upper left pulmonary edema vs early pneumonia.  Last Echo 1/2022 EF 61-65 % Continue to monitor  Procal slightly improved, CRP improved On Linezolid and meropenum       PVD (peripheral vascular disease)     SIRS (systemic inflammatory response syndrome) 01/09/2022    Admission  - WBC 17.78   -   - RR 16 - 1/10: VSS/wnl - CXR - Mild pulm edema - Blood cultures no growth at 24 hours  - Procalcitonin 0.29 - UA : glucose 1000, negative Leucocytes/nitrate - Empiric Zosyn and Vancomcyin     Sleep apnea     Substance abuse     Vitamin D deficiency        Allergies   Allergen Reactions    Adhesive Tape Hives, Other (See Comments) and Rash    Nsaids Hives    Latex Other (See Comments) and Hives    Other Itching     Bandaids, MRSA, SURECLOSE    Wound Dressing Adhesive Hives and Rash       Past Surgical History:   Procedure Laterality Date    AMPUTATION DIGIT Right 2/27/2023    Procedure: Excision of right first metatarsal head, right second toe amputation;  Surgeon: Jean Oliveira DPM;  Location: [facility];  Service: Podiatry;  Laterality: Right;    BELOW KNEE AMPUTATION Left 12/16/2022    Procedure: AMPUTATION BELOW KNEE, LEFT;  Surgeon:  Huy White MD;  Location: [facility] OR;  Service: Orthopedics;  Laterality: Left;    CARDIAC CATHETERIZATION N/A 07/14/2020    CARDIAC CATHETERIZATION N/A 04/23/2021    Procedure: Left Heart Cath;  Surgeon: Melba Romo MD;  Location: [facility] CATH INVASIVE LOCATION;  Service: Cardiology;  Laterality: N/A;    CARDIAC CATHETERIZATION N/A 04/30/2021    Procedure: Percutaneous Coronary Intervention;  Surgeon: Russell Voss MD;  Location: [facility] CATH INVASIVE LOCATION;  Service: Cardiovascular;  Laterality: N/A;    CARDIAC CATHETERIZATION N/A 04/30/2021    Procedure: Stent NIKKI coronary;  Surgeon: Russell Voss MD;  Location: [facility] CATH INVASIVE LOCATION;  Service: Cardiovascular;  Laterality: N/A;    CARDIAC CATHETERIZATION Left 11/13/2021    Procedure: Left Heart Cath;  Surgeon: Niall Rios MD;  Location: [facility] CATH INVASIVE LOCATION;  Service: Cardiology;  Laterality: Left;    CAROTID STENT Left     COLONOSCOPY      COLONOSCOPY N/A 05/14/2021    Procedure: COLONOSCOPY;  Surgeon: Mingo Duarte MD;  Location: [facility] ENDOSCOPY;  Service: Gastroenterology;  Laterality: N/A;    CORONARY ARTERY BYPASS GRAFT N/A 2013    CABG X 3    CYSTOSCOPY BLADDER STONE LITHOTRIPSY Bilateral     ENDOSCOPY N/A 04/12/2021    Procedure: ESOPHAGOGASTRODUODENOSCOPY;  Surgeon: Mingo Duarte MD;  Location: [facility] ENDOSCOPY;  Service: Gastroenterology;  Laterality: N/A;    ENDOSCOPY N/A 05/14/2021    Procedure: ESOPHAGOGASTRODUODENOSCOPY;  Surgeon: Mingo Duarte MD;  Location: [facility] ENDOSCOPY;  Service: Gastroenterology;  Laterality: N/A;    ENDOSCOPY N/A 01/28/2022    Procedure: ESOPHAGOGASTRODUODENOSCOPY;  Surgeon: Mingo Duarte MD;  Location: [facility] ENDOSCOPY;  Service: Gastroenterology;  Laterality: N/A;    HYSTERECTOMY      INTERVENTIONAL RADIOLOGY PROCEDURE N/A 10/21/2021    Procedure: tunneled central venous catheter placement;  Surgeon: Donnie Robles MD;  Location:   [facility]GIO INVASIVE LOCATION;  Service: Interventional Radiology;  Laterality: N/A;    INTERVENTIONAL RADIOLOGY PROCEDURE N/A 01/27/2022    Procedure: tunneled central venous catheter placement;  Surgeon: Donnie Robles MD;  Location: [facility] INVASIVE LOCATION;  Service: Interventional Radiology;  Laterality: N/A;    LAPAROSCOPIC TUBAL LIGATION      TUNNELED VENOUS CATHETER PLACEMENT         Family History   Problem Relation Age of Onset    Heart disease Mother     Lung cancer Mother     Heart disease Father     Heart attack Father     Diabetes Father     Heart disease Half-Sister         Dad's side    Heart disease Brother     No Known Problems Sister     No Known Problems Sister     No Known Problems Sister     No Known Problems Sister     No Known Problems Sister     Pancreatic cancer Half-Sister         Dad's side    No Known Problems Brother     No Known Problems Brother     Heart attack Half-Brother     Heart attack Half-Brother     No Known Problems Maternal Grandmother     No Known Problems Maternal Grandfather     No Known Problems Paternal Grandmother     No Known Problems Paternal Grandfather        Social History     Socioeconomic History    Marital status: Legally      Spouse name: wesley dumont   Tobacco Use    Smoking status: Some Days     Packs/day: 0.25     Years: 46.00     Pack years: 11.50     Types: Cigarettes     Start date: 2/1/1999    Smokeless tobacco: Never   Vaping Use    Vaping Use: Every day    Substances: Nicotine    Devices: Disposable   Substance and Sexual Activity    Alcohol use: No    Drug use: Not Currently     Types: Marijuana    Sexual activity: Defer           Objective   Physical Exam  Vitals and nursing note reviewed.   Constitutional:       Appearance: She is well-developed.   HENT:      Head: Normocephalic and atraumatic.   Eyes:      Pupils: Pupils are equal, round, and reactive to light.   Neck:      Thyroid: No thyromegaly.      Vascular: No JVD.       Trachea: No tracheal deviation.   Cardiovascular:      Rate and Rhythm: Normal rate.      Pulses:           Radial pulses are 2+ on the right side and 2+ on the left side.        Dorsalis pedis pulses are 2+ on the right side and 2+ on the left side.      Heart sounds: Normal heart sounds, S1 normal and S2 normal.      Comments: Right AV graft upper  Pulmonary:      Effort: Pulmonary effort is normal.      Breath sounds: Normal breath sounds.   Abdominal:      General: Bowel sounds are normal.   Musculoskeletal:         General: Normal range of motion.      Comments: Left BKA, right partial foot amputation   Skin:     General: Skin is warm and dry.      Capillary Refill: Capillary refill takes 2 to 3 seconds.   Neurological:      Mental Status: She is alert and oriented to person, place, and time.      GCS: GCS eye subscore is 4. GCS verbal subscore is 5. GCS motor subscore is 6.   Psychiatric:         Speech: Speech normal.         Behavior: Behavior normal.         Thought Content: Thought content normal.       ECG 12 Lead      Date/Time: 8/26/2023 1:24 PM  Performed by: Paulina Solano MD  Authorized by: Paulina Solano MD   Interpreted by physician  Comparison: compared with previous ECG from 5/12/2023  Rhythm: sinus rhythm  Comments: Sinus rhythm ventricular rate 67 bpm, KS interval 142 ms,  ms, QTc 538 ms, marked widened QRS.  This is my independent interpretation.    ECG 12 Lead      Date/Time: 8/26/2023 1:26 PM  Performed by: Paulina Solano MD  Authorized by: Paulina Solano MD   Interpreted by physician  Comparison: compared with previous ECG from 8/26/2023  Similar to previous ECG  Rhythm: sinus rhythm  Comments: Sinus with ventricular rate 40 bpm, KS interval 214 ms,  ms, QTc 494 ms, marked widening of QRS.  My independent interpretation.             ED Course  ED Course as of 08/26/23 1327   Sat Aug 26, 2023   1204 Call dialysis center to see if patient is available to  go back if discharged for dialysis today.  CMP pending. [MICHELLE]   1225 Chemistry returned with potassium 8.8.  Repeat EKG performed [MICHELLE]      ED Course User Index  [MICHELLE] Paulina Solano MD           Vitals:    08/26/23 1245 08/26/23 1256 08/26/23 1306 08/26/23 1306   BP: 161/87 173/77  173/77   BP Location:    Left arm   Patient Position:    Lying   Pulse: 50 60 52 57   Resp:   18 19   Temp:       TempSrc:       SpO2:    97%   Weight:       Height:          Lab Results (last 24 hours)       Procedure Component Value Units Date/Time    Comprehensive Metabolic Panel [[id number]]  (Abnormal) Collected: 08/26/23 1152    Specimen: Blood Updated: 08/26/23 1243     Glucose 109 mg/dL      BUN 73 mg/dL      Creatinine 6.75 mg/dL      Sodium 133 mmol/L      Potassium 8.8 mmol/L      Comment: Confirmed by repeat analysis          Chloride 101 mmol/L      CO2 18.0 mmol/L      Calcium 9.7 mg/dL      Total Protein 8.4 g/dL      Albumin 3.7 g/dL      ALT (SGPT) 9 U/L      AST (SGOT) 14 U/L      Alkaline Phosphatase 212 U/L      Total Bilirubin 0.4 mg/dL      Globulin 4.7 gm/dL      A/G Ratio 0.8 g/dL      BUN/Creatinine Ratio 10.8     Anion Gap 14.0 mmol/L      eGFR 6.4 mL/min/1.73      Comment: <15 Indicative of kidney failure       Narrative:      GFR Normal >60  Chronic Kidney Disease <60  Kidney Failure <15      Magnesium [[id number]]  (Abnormal) Collected: 08/26/23 1041    Specimen: Blood Updated: 08/26/23 1138     Magnesium 2.6 mg/dL     BNP [[id number]]  (Abnormal) Collected: 08/26/23 1041    Specimen: Blood Updated: 08/26/23 1130     proBNP 3,471.0 pg/mL     Narrative:      Among patients with dyspnea, NT-proBNP is highly sensitive for the detection of acute congestive heart failure. In addition NT-proBNP of <300 pg/ml effectively rules out acute congestive heart failure with 99% negative predictive value.      CBC & Differential [[id number]]  (Abnormal) Collected: 08/26/23 1041    Specimen: Blood Updated: 08/26/23 1102     Narrative:      The following orders were created for panel order CBC & Differential.  Procedure                               Abnormality         Status                     ---------                               -----------         ------                     CBC Auto Differential[[id number]]        Abnormal            Final result                 Please view results for these tests on the individual orders.    CBC Auto Differential [[id number]]  (Abnormal) Collected: 08/26/23 1041    Specimen: Blood Updated: 08/26/23 1102     WBC 9.36 10*3/mm3      RBC 3.24 10*6/mm3      Hemoglobin 9.8 g/dL      Hematocrit 30.2 %      MCV 93.2 fL      MCH 30.2 pg      MCHC 32.5 g/dL      RDW 15.9 %      RDW-SD 54.4 fl      MPV 8.8 fL      Platelets 164 10*3/mm3      Neutrophil % 65.2 %      Lymphocyte % 26.2 %      Monocyte % 5.2 %      Eosinophil % 1.9 %      Basophil % 1.1 %      Immature Grans % 0.4 %      Neutrophils, Absolute 6.10 10*3/mm3      Lymphocytes, Absolute 2.45 10*3/mm3      Monocytes, Absolute 0.49 10*3/mm3      Eosinophils, Absolute 0.18 10*3/mm3      Basophils, Absolute 0.10 10*3/mm3      Immature Grans, Absolute 0.04 10*3/mm3      nRBC 0.0 /100 WBC            No radiology results for the last day                                   Medical Decision Making  Hemodialysis patient who missed dialysis x2 with significant delay in chemistry and widening QRS on EKG.  Potassium resulted at 8.8.  Nephrologist and hospitalist consulted and emergency dialysis will take place.  Calcium gluconate given for cardiac membrane stability as well as dextrose and insulin, albuterol use to help shift potassium.  Patient was transferred emergently to dialysis.    Problems Addressed:  Hyperkalemia: complicated acute illness or injury  Patient requiring acute dialysis: complicated acute illness or injury    Amount and/or Complexity of Data Reviewed  Labs: ordered.  Radiology: ordered.  ECG/medicine tests: ordered.    Risk  OTC  drugs.  Prescription drug management.  Decision regarding hospitalization.        Final diagnoses:   Hyperkalemia   Patient requiring acute dialysis       ED Disposition  ED Disposition       ED Disposition   Send to Specialty Department    Condition   --    Comment   To dialysis    Level of Care: Telemetry [5]  Diagnosis: Hyperkalemia [461026]  Admitting Physician: RASHEL FUENTES [[id number]]  Attending Physician: RASHEL FUENTES [[id number]]  Certification: I Certify That Inpatient Hospital Services Are Medically Necessa ry For Greater Than 2 Midnights                 No follow-up provider specified.       Medication List      No changes were made to your prescriptions during this visit.         This document has been electronically signed by Paulina Solano MD on August 26, 2023 13:27 CDT  .sign  [employer]  Part of this note may be an electronic transcription/translation of spoken language to printed text using the Dragon Dictation System.        Paulina Solano MD  08/26/23 1314       Paulina Solano MD  08/26/23 1327       Paulina Solano MD  08/26/23 1329

## 2023-08-26 NOTE — ED NOTES
Nursing report ED to floor  Yamileth Slater  63 y.o.  female    HPI:   Chief Complaint   Patient presents with    Tremors     States that they are getting worse       Admitting doctor:   Parish Oviedo MD    Consulting provider(s):  Consults       Date and Time Order Name Status Description    7/29/2023 12:28 PM Inpatient Nephrology Consult Completed              Admitting diagnosis:   The primary encounter diagnosis was Hyperkalemia. A diagnosis of Patient requiring acute dialysis was also pertinent to this visit.    Code status:   Current Code Status       Date Active Code Status Order ID Comments User Context       Prior            Allergies:   Adhesive tape, Nsaids, Latex, Other, and Wound dressing adhesive    Intake and Output  No intake or output data in the 24 hours ending 08/26/23 1308    Weight:       08/26/23  0957   Weight: 113 kg (250 lb)       Most recent vitals:   Vitals:    08/26/23 1245 08/26/23 1256 08/26/23 1306 08/26/23 1306   BP: 161/87 173/77  173/77   BP Location:    Left arm   Patient Position:    Lying   Pulse: 50 60 52 57   Resp:   18 19   Temp:       TempSrc:       SpO2:    97%   Weight:       Height:         Oxygen Therapy: 2L BNC    Active LDAs/IV Access:   Lines, Drains & Airways       Active LDAs       Name Placement date Placement time Site Days    Peripheral IV 08/26/23 1301 Left Antecubital 08/26/23  1301  Antecubital  less than 1                    Labs (abnormal labs have a star):   Labs Reviewed   COMPREHENSIVE METABOLIC PANEL - Abnormal; Notable for the following components:       Result Value    Glucose 109 (*)     BUN 73 (*)     Creatinine 6.75 (*)     Sodium 133 (*)     Potassium 8.8 (*)     CO2 18.0 (*)     Alkaline Phosphatase 212 (*)     eGFR 6.4 (*)     All other components within normal limits    Narrative:     GFR Normal >60  Chronic Kidney Disease <60  Kidney Failure <15     MAGNESIUM - Abnormal; Notable for the following components:    Magnesium 2.6 (*)     All  other components within normal limits   BNP (IN-HOUSE) - Abnormal; Notable for the following components:    proBNP 3,471.0 (*)     All other components within normal limits    Narrative:     Among patients with dyspnea, NT-proBNP is highly sensitive for the detection of acute congestive heart failure. In addition NT-proBNP of <300 pg/ml effectively rules out acute congestive heart failure with 99% negative predictive value.     CBC WITH AUTO DIFFERENTIAL - Abnormal; Notable for the following components:    RBC 3.24 (*)     Hemoglobin 9.8 (*)     Hematocrit 30.2 (*)     RDW 15.9 (*)     RDW-SD 54.4 (*)     All other components within normal limits   BASIC METABOLIC PANEL   CBC AND DIFFERENTIAL    Narrative:     The following orders were created for panel order CBC & Differential.  Procedure                               Abnormality         Status                     ---------                               -----------         ------                     CBC Auto Differential[[id number]]        Abnormal            Final result                 Please view results for these tests on the individual orders.       Meds given in ED:   Medications   dextrose (D50W) (25 g/50 mL) IV injection 50 mL (50 mL Intravenous Given 8/26/23 1301)   albuterol (PROVENTIL) nebulizer solution 0.083% 2.5 mg/3mL ( Nebulization Canceled Entry 8/26/23 1307)   albuterol (PROVENTIL) nebulizer solution 0.083% 2.5 mg/3mL (has no administration in time range)   albuterol (PROVENTIL) nebulizer solution 0.5% 2.5 mg/0.5mL (2.5 mg Nebulization Given 8/26/23 1306)   calcium gluconate 1000 Mg/100ml 0.8% NaCl IV SOLN (1,000 mg Intravenous New Bag 8/26/23 1302)   insulin regular (humuLIN R,novoLIN R) injection 10 Units (10 Units Subcutaneous Given 8/26/23 1301)   sodium polystyrene (KAYEXALATE) 15 GM/60ML suspension 30 g (30 g Oral Given 8/26/23 1302)           NIH Stroke Scale:       Isolation/Infection(s):  Contact   CRE, ESBL E coli     COVID  Testing  Collected    Resulted na    Nursing report ED to floor:  Mental status: drowsy   Ambulatory status: with assitance   Precautions: na    ED nurse phone [phone]

## 2023-08-26 NOTE — H&P
[facility] Medicine Admission      Date of Admission: 8/26/2023      Primary Care Physician: Kiersten Wilson APRN      Chief Complaint: hyperkalemia    HPI:63-year-old female presented to the ER after missing HD on Thursday and today. During evaluation it was noted that potassium is 8.8. Alert, oriented, no SOB, no chest pain, no cold symptoms. Relevant past medical history: HTN(stable, increased risk stroke, rupture), Hyperlipidemia(stable, increased risk cardiovascular events), Diabetes Mellitus(stable, increased risk cardiovascular events), Obesity(uncontrolled, increased risk cardiovascular events) and Chronic Kidney Disease(stable, increased risk renal failure), COPD with chronic, O2 use over the last 3 years, coronary artery disease multiple interventions, peripheral vascular disease multiple distal intervention, poorly controlled diabetes with diabetic foot infection. history of MRSA CRE, on dialysis, poor follow-up.  former smoker.  Quit 1999.   Patient is medically complex, multiple hospitalizations over the past few years.  Multiple hospital admissions related to diabetic foot infections was transferred to [facility] in [facility] for infectious disease evaluation.  She subsequently underwent vascular evaluation and bilateral lower extremity arteriogram with intervention to both distal SFA and popliteal, she also went left transmetatarsal amputation at that time.  Surgical site dehiscence of left transmit site has had prolonged wound care with poor healing eventually required L BK which has healed performed in 12/2022.    2013 CABG x3  2014 attempted left carotid endarterectomy, procedure aborted unable to find carotid (Dr. Aguayo)  5/2014 L Transfemoral stent (Dr. Robles)  2014 arteriogram: PTA right SFA stent (Dr. Robles)  11/2021 LHC: Distal left main ostial circumflex stenosis, patent LIMA to LAD, 100% occluded RCA with occluded  SVG to RCA  11/2021 PTA stent distal left main [facility]     1/2022 tunnel cath placement     9/15/2022 bilateral lower extremity arteriogram: PTA proximal SFA, PTA and stent stenosis right SFA, PTA/stent right popliteal.  Left SFA PTA stent Olevia 6 x 40 mm 6 x 120 mm due to dissection, in stent stenosis post intervention [facility]  9/19/2022 left transmetatarsal amputation [facility]  12/2022 LEFT BKA Dr. White          Past Medical History:   Diagnosis Date    Acute blood loss anemia 04/16/2017    Likely due to gastric oozing at this time. - Dr. Duarte (GI) was consulted and has now signed off, will follow up outpatient - pill colonoscopy showed AVMs - continue to monitor    Acute cystitis with hematuria 03/31/2021 1/13: IV Rocephin 1 gm q 24 1/14 : transitioned  to omnicef 300mg. Urine cultures resulted and did not show growth. Omnicef discontinued as patient is asymptomatic    Altered mental status 01/09/2022    - AMS on presentation - initial ABG pH 7.3, CO2 34 - Procal 0.29 - UA negative for acute cysitits -CTA head wnl  - Empiric Zosyn and Vancomycin -Lactate 2.5 on admission  - blood cultures no growth at 24 hours      Anxiety     CAD (coronary artery disease) 04/24/2021    S/P 3 stents 5/1/2021 for [facility] Continue ASA 81mg & Clopidogrel 75mg Continue Atorvastatin 40mg    Carotid artery stenosis     CHF (congestive heart failure)     Chronic obstructive lung disease     CKD (chronic kidney disease) stage 4, GFR 15-29 ml/min     CKD (chronic kidney disease), symptom management only, stage 5 10/05/2020    Results from last 7 days Lab Units 12/15/21 0548 12/14/21 1323 12/14/21 0916 CREATININE mg/dL 3.92* 3.21* 3.32*  Baseline creatinine 2-3 GFR 13-25 GFR 15 Dialysis MWF, sees Dr. Lauren Nephrology consult,, appreciate recommendations Continue Bumex 1mg bid daily Holding Bumex 2mg 4 times a week    Colonic polyp     Coronary arteriosclerosis     Diabetes mellitus     Diabetic neuropathy     Ear  pain, right 10/18/2021    - canal trauma due to patient scratching and DMT2 - added cortisporin ear drops    Elevated troponin 10/12/2021    -most likely from CKD -Trending down -Neg chest pain    Generalized abdominal pain 07/01/2022    Could be due to initiation of tube feeds vs dyspepsia vs abdominal cramps related to no PO intake due to intubation vs constipation Continue current laxative regiment  If no bowel movement by this afternoon will consider enema    GERD (gastroesophageal reflux disease)     GI bleed 05/13/2021    - GI will follow up outpatient - Protonix 40mg daily - Avoid medical DVT prophy and use mechanical at this time instead. - Continue to monitor - pill colonoscopy results showed AVMs    History of transfusion     Hypercholesterolemia     Hyperosmolar hyperglycemic state (HHS) 06/25/2022    Serum glucose 605 on admission  Anion gap 16 PH 7.37 Bicarb 27.9 Continue fluids  Insulin drip with [facility] protocol  Anion gap closed around 10 AM, received one dose of Levamir subq, will stop insulin drip after 2 hrs      Hypertension     Hypokalemia 05/27/2022    Will replace as needed. Will be cautious in the setting of ESRD to avoid need for emergency dialysis   Monitor Qtc intervals on EKG      Hypomagnesemia 06/27/2021    Monitor and replace    Morbid obesity     Nephrolithiasis     On mechanically assisted ventilation 06/26/2022    Vent management and sedation orders placed.  - [facility] intensivist group consulted for vent management appreciate recommendations  - plan to extubate today      Peripheral vascular disease     Pleural effusion on right 06/26/2022    CXR on 6/30/22 read as a small upper left pulmonary edema vs early pneumonia.  Last Echo 1/2022 EF 61-65 % Continue to monitor  Procal slightly improved, CRP improved On Linezolid and meropenum       PVD (peripheral vascular disease)     SIRS (systemic inflammatory response syndrome) 01/09/2022    Admission  - WBC 17.78   -   - RR 16 -  1/10: VSS/wnl - CXR - Mild pulm edema - Blood cultures no growth at 24 hours  - Procalcitonin 0.29 - UA : glucose 1000, negative Leucocytes/nitrate - Empiric Zosyn and Vancomcyin     Sleep apnea     Substance abuse     Vitamin D deficiency       Past Surgical History:   Procedure Laterality Date    AMPUTATION DIGIT Right 2/27/2023    Procedure: Excision of right first metatarsal head, right second toe amputation;  Surgeon: Jean Oliveira DPM;  Location: [facility] OR;  Service: Podiatry;  Laterality: Right;    BELOW KNEE AMPUTATION Left 12/16/2022    Procedure: AMPUTATION BELOW KNEE, LEFT;  Surgeon: Huy White MD;  Location: [facility] OR;  Service: Orthopedics;  Laterality: Left;    CARDIAC CATHETERIZATION N/A 07/14/2020    CARDIAC CATHETERIZATION N/A 04/23/2021    Procedure: Left Heart Cath;  Surgeon: Melba Romo MD;  Location: [facility] CATH INVASIVE LOCATION;  Service: Cardiology;  Laterality: N/A;    CARDIAC CATHETERIZATION N/A 04/30/2021    Procedure: Percutaneous Coronary Intervention;  Surgeon: Russell Voss MD;  Location: [facility] CATH INVASIVE LOCATION;  Service: Cardiovascular;  Laterality: N/A;    CARDIAC CATHETERIZATION N/A 04/30/2021    Procedure: Stent NIKKI coronary;  Surgeon: Russell Voss MD;  Location: [facility] CATH INVASIVE LOCATION;  Service: Cardiovascular;  Laterality: N/A;    CARDIAC CATHETERIZATION Left 11/13/2021    Procedure: Left Heart Cath;  Surgeon: Niall Rios MD;  Location: [facility] CATH INVASIVE LOCATION;  Service: Cardiology;  Laterality: Left;    CAROTID STENT Left     COLONOSCOPY      COLONOSCOPY N/A 05/14/2021    Procedure: COLONOSCOPY;  Surgeon: Mingo Duarte MD;  Location: [facility] ENDOSCOPY;  Service: Gastroenterology;  Laterality: N/A;    CORONARY ARTERY BYPASS GRAFT N/A 2013    CABG X 3    CYSTOSCOPY BLADDER STONE LITHOTRIPSY Bilateral     ENDOSCOPY N/A 04/12/2021    Procedure: ESOPHAGOGASTRODUODENOSCOPY;  Surgeon: Mingo Duarte MD;   Location: [facility] ENDOSCOPY;  Service: Gastroenterology;  Laterality: N/A;    ENDOSCOPY N/A 05/14/2021    Procedure: ESOPHAGOGASTRODUODENOSCOPY;  Surgeon: Mingo Duarte MD;  Location: [facility] ENDOSCOPY;  Service: Gastroenterology;  Laterality: N/A;    ENDOSCOPY N/A 01/28/2022    Procedure: ESOPHAGOGASTRODUODENOSCOPY;  Surgeon: Mingo Duarte MD;  Location: [facility] ENDOSCOPY;  Service: Gastroenterology;  Laterality: N/A;    HYSTERECTOMY      INTERVENTIONAL RADIOLOGY PROCEDURE N/A 10/21/2021    Procedure: tunneled central venous catheter placement;  Surgeon: Donnie Robles MD;  Location: [facility] ANGIO INVASIVE LOCATION;  Service: Interventional Radiology;  Laterality: N/A;    INTERVENTIONAL RADIOLOGY PROCEDURE N/A 01/27/2022    Procedure: tunneled central venous catheter placement;  Surgeon: Donnie Robles MD;  Location: [facility] ANGIO INVASIVE LOCATION;  Service: Interventional Radiology;  Laterality: N/A;    LAPAROSCOPIC TUBAL LIGATION      TUNNELED VENOUS CATHETER PLACEMENT        Allergies   Allergen Reactions    Adhesive Tape Hives, Other (See Comments) and Rash    Nsaids Hives    Latex Other (See Comments) and Hives    Other Itching     Bandaids, MRSA, SURECLOSE    Wound Dressing Adhesive Hives and Rash      Social History     Socioeconomic History    Marital status: Legally      Spouse name: wesley dumont   Tobacco Use    Smoking status: Some Days     Packs/day: 0.25     Years: 46.00     Pack years: 11.50     Types: Cigarettes     Start date: 2/1/1999    Smokeless tobacco: Never   Vaping Use    Vaping Use: Every day    Substances: Nicotine    Devices: Disposable   Substance and Sexual Activity    Alcohol use: No    Drug use: Not Currently     Types: Marijuana    Sexual activity: Defer        Concurrent Medical History:  has a past medical history of Acute blood loss anemia (04/16/2017), Acute cystitis with hematuria (03/31/2021), Altered mental status (01/09/2022), Anxiety, CAD  (coronary artery disease) (04/24/2021), Carotid artery stenosis, CHF (congestive heart failure), Chronic obstructive lung disease, CKD (chronic kidney disease) stage 4, GFR 15-29 ml/min, CKD (chronic kidney disease), symptom management only, stage 5 (10/05/2020), Colonic polyp, Coronary arteriosclerosis, Diabetes mellitus, Diabetic neuropathy, Ear pain, right (10/18/2021), Elevated troponin (10/12/2021), Generalized abdominal pain (07/01/2022), GERD (gastroesophageal reflux disease), GI bleed (05/13/2021), History of transfusion, Hypercholesterolemia, Hyperosmolar hyperglycemic state (HHS) (06/25/2022), Hypertension, Hypokalemia (05/27/2022), Hypomagnesemia (06/27/2021), Morbid obesity, Nephrolithiasis, On mechanically assisted ventilation (06/26/2022), Peripheral vascular disease, Pleural effusion on right (06/26/2022), PVD (peripheral vascular disease), SIRS (systemic inflammatory response syndrome) (01/09/2022), Sleep apnea, Substance abuse, and Vitamin D deficiency.    Past Surgical History:  has a past surgical history that includes Colonoscopy; Carotid stent (Left); Cardiac catheterization (N/A, 07/14/2020); cystoscopy bladder stone lithotripsy (Bilateral); Esophagogastroduodenoscopy (N/A, 04/12/2021); Cardiac catheterization (N/A, 04/23/2021); Cardiac catheterization (N/A, 04/30/2021); Cardiac catheterization (N/A, 04/30/2021); Esophagogastroduodenoscopy (N/A, 05/14/2021); Colonoscopy (N/A, 05/14/2021); Coronary artery bypass graft (N/A, 2013); Interventional radiology procedure (N/A, 10/21/2021); Cardiac catheterization (Left, 11/13/2021); Interventional radiology procedure (N/A, 01/27/2022); Esophagogastroduodenoscopy (N/A, 01/28/2022); Hysterectomy; Laparoscopic tubal ligation; Tunneled venous catheter placement; Leg amputation, lower tibia/fibula (Left, 12/16/2022); and Finger amputation (Right, 2/27/2023).    Family History: family history includes Diabetes in her father; Heart attack in her father,  half-brother, and half-brother; Heart disease in her brother, father, half-sister, and mother; Lung cancer in her mother; No Known Problems in her brother, brother, maternal grandfather, maternal grandmother, paternal grandfather, paternal grandmother, sister, sister, sister, sister, and sister; Pancreatic cancer in her half-sister.     Social History:  reports that she has been smoking cigarettes. She started smoking about 24 years ago. She has a 11.50 pack-year smoking history. She has never used smokeless tobacco. She reports that she does not currently use drugs after having used the following drugs: Marijuana. She reports that she does not drink alcohol.    Allergies:   Allergies   Allergen Reactions    Adhesive Tape Hives, Other (See Comments) and Rash    Nsaids Hives    Latex Other (See Comments) and Hives    Other Itching     Bandaids, MRSA, SURECLOSE    Wound Dressing Adhesive Hives and Rash       Medications:   Prior to Admission medications    Medication Sig Start Date End Date Taking? Authorizing Provider   albuterol (PROVENTIL) (2.5 MG/3ML) 0.083% nebulizer solution Inhale the contents of 1 vial by nebulization Every 4 (Four) Hours As Needed for Wheezing. 10/28/21   Haily Mcneil MD   albuterol sulfate  (90 Base) MCG/ACT inhaler Inhale 2 puffs Every 4 (Four) Hours As Needed for Wheezing. 3/26/21   Nirmal Calvillo MD   atorvastatin (LIPITOR) 40 MG tablet Take 1 tablet by mouth Daily. Indications: High Amount of Fats in the Blood    Caio Thompson MD   Blood Glucose Monitoring Suppl ([employer] Blood Glucose Meter) w/Device kit 1 each 3 (Three) Times a Day. 10/9/20   Nirmal Calvillo MD   carvedilol (COREG) 6.25 MG tablet Take 1 tablet by mouth 2 (Two) Times a Day With Meals. 12/23/22   Jef Ramires MD   Cetirizine HCl 10 MG capsule Take 1 capsule by mouth Daily. Indications: Hayfever 11/4/21   Caio Thompson MD   clopidogrel (PLAVIX) 75 MG tablet Take 1 tablet by  "mouth Daily.  Patient taking differently: Take 1 tablet by mouth Daily. 3/26/21   Nirmal Calvillo MD   cyclobenzaprine (FLEXERIL) 5 MG tablet Take 1 tablet by mouth 2 (Two) Times a Day. Indications: Muscle Spasm 10/28/22   Kristie Kern DPM   Diclofenac Sodium (VOLTAREN) 1 % gel gel Apply 4 g topically to the appropriate area as directed 4 (Four) Times a Day As Needed (Ankle pain). 4/26/22   Kit Brar MD   docusate sodium (COLACE) 100 MG capsule Take 1 capsule by mouth 2 (Two) Times a Day. Indications: Constipation 1/1/21   Caio Thompson MD   DULoxetine (CYMBALTA) 20 MG capsule Take 1 capsule by mouth Daily. Indications: Disease of the Peripheral Nerves 1/1/21   Caio Thompson MD   Easy Touch Insulin Syringe 30G X 5/16\" 0.5 ML misc USE AS DIRECTED WITH LEVEMIR 12/1/21   Caio Thompson MD   EASY TOUCH PEN NEEDLES 31G X 8 MM misc  8/7/20   Caio Thompson MD   furosemide (LASIX) 20 MG tablet Take 1 tablet by mouth Daily. Indications: Edema    Caio Thompson MD   gabapentin (NEURONTIN) 300 MG capsule Take 1 capsule by mouth Daily for 3 days. 3/16/23 3/19/23  Esau Ferguson MD   hydrOXYzine (ATARAX) 25 MG tablet Take 1 tablet by mouth Every 8 (Eight) Hours As Needed for Itching. Indications: Itching 1/1/21   Caio Thompson MD   insulin detemir (LEVEMIR) 100 UNIT/ML injection Inject 24 Units under the skin into the appropriate area as directed 2 (Two) Times a Day. pt takes in the am and pm  Indications: T2DM 4/12/23   Caio Thompson MD   Insulin Lispro, 1 Unit Dial, (HUMALOG) 100 UNIT/ML solution pen-injector Inject 14-35 Units under the skin into the appropriate area as directed 3 (Three) Times a Day With Meals. Takes 14 units with meals plus sliding scale  Sliding scale:   150-199 take 4 units  200-249 take 8 units  250-299 take 12 units  300-349 take 16 units  350-400 take 20 units  greater than 400, call physician 12/23/22   Jef Ramires, " MD   Insulin Pen Needle (NovoFine) 30G X 8 MM misc As directed 4 times daily 3/26/21   Nirmal Calvillo MD   Insulin Pen Needle 31G X 8 MM misc Use to inject insulin 4 (Four) Times a Day as directed. 10/28/21   Haily Mcneil MD   ipratropium-albuterol (DUO-NEB) 0.5-2.5 mg/3 ml nebulizer Take 3 mL by nebulization 4 (Four) Times a Day As Needed. Indications: Chronic Obstructive Lung Disease 3/22/17   Caio Thompson MD   losartan (COZAAR) 25 MG tablet Take 1 tablet by mouth Daily. 12/24/22   Jef Ramires MD   memantine (NAMENDA) 5 MG tablet Take 1 tablet by mouth 2 (Two) Times a Day. Indications: Cognitive Dysfunction 10/28/22   ConchaKristie lorenzo DPM   nitroglycerin (NITROSTAT) 0.4 MG SL tablet Place 1 tablet under the tongue Every 5 (Five) Minutes As Needed for Chest Pain (x 3 doses). Indications: Acute Angina Pectoris 1/1/15   Caio Thompson MD   O2 (OXYGEN) Inhale 2 L/min Continuous. Only uses when patient takes Trilogy  Indications: SOA 1/1/21   Caio Thompson MD   ondansetron (ZOFRAN) 4 MG tablet Take 1 tablet by mouth Every 6 (Six) Hours As Needed for Nausea or Vomiting. 12/23/22   Jef Ramires MD   oxyCODONE (ROXICODONE) 10 MG tablet Take 1 tablet by mouth 3 (Three) Times a Day. Indications: Acute Pain, Chronic Pain 4/14/23   Caio Thompson MD   pantoprazole (PROTONIX) 40 MG EC tablet Take 1 tablet by mouth Every Night. 4/26/22   Kit Brar MD   promethazine (PHENERGAN) 25 MG tablet Take 1 tablet by mouth Every 6 (Six) Hours As Needed for Nausea or Vomiting. Indications: Nausea and Vomiting 11/4/22   Caio Thompson MD   sevelamer (RENVELA) 800 MG tablet Take 1 tablet by mouth 3 (Three) Times a Day With Meals. Indications: High Amount of Phosphate in the Blood 1/26/22   Caio Thompson MD   Vitamin D, Ergocalciferol, 28514 units capsule Take 1 capsule by mouth 1 (One) Time Per Week. Take on Wednesday  Indications: Kidney Failure Syndrome  1/1/21   Provider, MD Caio   insulin aspart (novoLOG FLEXPEN) 100 UNIT/ML solution pen-injector sc pen Inject 30 Units under the skin into the appropriate area as directed 3 (Three) Times a Day With Meals. 2/1/22 3/17/22  Blake Burris MD     I have utilized all available immediate resources to obtain, update, or review the patient's current medications (including all prescriptions, over-the-counter products, herbals, cannabis/cannabidiol products, and vitamin/mineral/dietary (nutritional) supplements).      Review of Systems:  Review of Systems   Constitutional:  Negative for activity change, appetite change, chills, diaphoresis and fatigue.   HENT:  Negative for congestion, ear discharge, hearing loss, rhinorrhea, sinus pain, sneezing and sore throat.    Eyes:  Negative for photophobia, discharge and visual disturbance.   Respiratory:  Negative for cough, chest tightness, shortness of breath and wheezing.    Cardiovascular:  Negative for chest pain and palpitations.   Gastrointestinal:  Negative for abdominal distention, abdominal pain, blood in stool, diarrhea, nausea and vomiting.   Endocrine: Negative for cold intolerance, heat intolerance, polydipsia, polyphagia and polyuria.   Genitourinary:  Negative for dysuria, flank pain, hematuria and urgency.   Musculoskeletal:  Negative for arthralgias, joint swelling and myalgias.   Skin:  Negative for color change.   Allergic/Immunologic: Negative for food allergies.   Neurological:  Negative for dizziness, seizures, syncope, speech difficulty, weakness and headaches.   Hematological:  Negative for adenopathy. Does not bruise/bleed easily.   Psychiatric/Behavioral:  Negative for confusion, hallucinations, self-injury and suicidal ideas. The patient is not nervous/anxious.     Otherwise complete ROS is negative except as mentioned above.    Physical Exam:   Temp:  [97.3 °F (36.3 °C)] 97.3 °F (36.3 °C)  Heart Rate:  [50-65] 57  Resp:  [16-19] 19  BP:  (161-208)/(72-87) 173/77  Physical Exam  Constitutional:       Appearance: Normal appearance. She is ill-appearing.   HENT:      Head: Normocephalic and atraumatic.      Comments: Face edema     Right Ear: Tympanic membrane normal.      Left Ear: Tympanic membrane normal.      Nose: Nose normal.      Mouth/Throat:      Mouth: Mucous membranes are moist.   Eyes:      Pupils: Pupils are equal, round, and reactive to light.   Cardiovascular:      Rate and Rhythm: Normal rate and regular rhythm.      Pulses: Normal pulses.      Heart sounds: Normal heart sounds.   Pulmonary:      Effort: Pulmonary effort is normal.      Breath sounds: Normal breath sounds.   Abdominal:      General: Abdomen is flat. Bowel sounds are normal.      Palpations: Abdomen is soft.   Musculoskeletal:         General: Normal range of motion.      Cervical back: Normal range of motion and neck supple.      Comments: Transmetatarsal amputation right foot and BKA left leg  Right upper arm AV fistula   Skin:     General: Skin is warm and dry.      Capillary Refill: Capillary refill takes less than 2 seconds.   Neurological:      General: No focal deficit present.      Mental Status: She is alert and oriented to person, place, and time.   Psychiatric:         Mood and Affect: Mood normal.         Behavior: Behavior normal.         Thought Content: Thought content normal.         Judgment: Judgment normal.         Results Reviewed:  I have personally reviewed current lab, radiology, and data and agree with results.  Lab Results (last 24 hours)       Procedure Component Value Units Date/Time    Comprehensive Metabolic Panel [[id number]]  (Abnormal) Collected: 08/26/23 1152    Specimen: Blood Updated: 08/26/23 1243     Glucose 109 mg/dL      BUN 73 mg/dL      Creatinine 6.75 mg/dL      Sodium 133 mmol/L      Potassium 8.8 mmol/L      Comment: Confirmed by repeat analysis          Chloride 101 mmol/L      CO2 18.0 mmol/L      Calcium 9.7 mg/dL      Total  Protein 8.4 g/dL      Albumin 3.7 g/dL      ALT (SGPT) 9 U/L      AST (SGOT) 14 U/L      Alkaline Phosphatase 212 U/L      Total Bilirubin 0.4 mg/dL      Globulin 4.7 gm/dL      A/G Ratio 0.8 g/dL      BUN/Creatinine Ratio 10.8     Anion Gap 14.0 mmol/L      eGFR 6.4 mL/min/1.73      Comment: <15 Indicative of kidney failure       Narrative:      GFR Normal >60  Chronic Kidney Disease <60  Kidney Failure <15      Magnesium [[id number]]  (Abnormal) Collected: 08/26/23 1041    Specimen: Blood Updated: 08/26/23 1138     Magnesium 2.6 mg/dL     BNP [[id number]]  (Abnormal) Collected: 08/26/23 1041    Specimen: Blood Updated: 08/26/23 1130     proBNP 3,471.0 pg/mL     Narrative:      Among patients with dyspnea, NT-proBNP is highly sensitive for the detection of acute congestive heart failure. In addition NT-proBNP of <300 pg/ml effectively rules out acute congestive heart failure with 99% negative predictive value.      CBC & Differential [[id number]]  (Abnormal) Collected: 08/26/23 1041    Specimen: Blood Updated: 08/26/23 1102    Narrative:      The following orders were created for panel order CBC & Differential.  Procedure                               Abnormality         Status                     ---------                               -----------         ------                     CBC Auto Differential[[id number]]        Abnormal            Final result                 Please view results for these tests on the individual orders.    CBC Auto Differential [[id number]]  (Abnormal) Collected: 08/26/23 1041    Specimen: Blood Updated: 08/26/23 1102     WBC 9.36 10*3/mm3      RBC 3.24 10*6/mm3      Hemoglobin 9.8 g/dL      Hematocrit 30.2 %      MCV 93.2 fL      MCH 30.2 pg      MCHC 32.5 g/dL      RDW 15.9 %      RDW-SD 54.4 fl      MPV 8.8 fL      Platelets 164 10*3/mm3      Neutrophil % 65.2 %      Lymphocyte % 26.2 %      Monocyte % 5.2 %      Eosinophil % 1.9 %      Basophil % 1.1 %      Immature Grans % 0.4 %       Neutrophils, Absolute 6.10 10*3/mm3      Lymphocytes, Absolute 2.45 10*3/mm3      Monocytes, Absolute 0.49 10*3/mm3      Eosinophils, Absolute 0.18 10*3/mm3      Basophils, Absolute 0.10 10*3/mm3      Immature Grans, Absolute 0.04 10*3/mm3      nRBC 0.0 /100 WBC           Imaging Results (Last 24 Hours)       Procedure Component Value Units Date/Time    XR Chest 1 View [[id number]] Collected: 08/26/23 1033     Updated: 08/26/23 1038    Narrative:      Comparison:  5/11/2023    FINDINGS:  Stable cardiomegaly.  Minimal patchy left suprahilar airspace opacity may  represent atelectasis or pneumonia in the appropriate setting.  No pleural  effusion or pneumothorax.                Assessment:    Active Hospital Problems    Diagnosis     **Hyperkalemia              Plan: 63 years old female patient with diabetes, vascular complications, transmetatarsal amputation right foot and BKA left leg, missed hemodialysis on Thursday and today, came with severe hyperkalemia 8.8, dialytic emergency, nephrologist on board.  Problems:  Dialytic emergency, hyperkalemia, pulmonary congestion  PVD  Mixed hyperlipidemia  Carotid stenosis  Type 2 diabetes  ESRD on  dialysis  Medical Decision Making  Number and Complexity of problems: 6  Differential Diagnosis: dialytic emergency    Conditions and Status:        Condition is unchanged.     [facility] Data  External documents reviewed: previous medical records  My EKG interpretation: sinus, prolonged QT  My plain film interpretation: left lower lobe infiltrate  Tests considered but not ordered: none     Decision rules/scores evaluated (example [id number], Wells, etc): n/a     Discussed with: patient     Treatment Plan  Urgent hemodialysis  Urgent nephrology consult  Am labs  CT chest without contrast rule out pneumonia    Care Planning  Shared decision making: the patient  Code status and discussions: full code    Disposition  Social Determinants of Health that impact treatment or disposition:  none  I expect the patient to be discharged to home in 3 days.      I confirmed that the patient's Advance Care Plan is present, code status is documented, or surrogate decision maker is listed in the patient's medical record.    I discussed the patient's findings and my recommendations with: patient, DR Solano      This document has been electronically signed by Parish Oviedo MD on August 26, 2023 13:41 CDT

## 2023-08-26 NOTE — PLAN OF CARE
Goal Outcome Evaluation:         Admit to 430, dialysis started upon arrival to floor.

## 2023-08-26 NOTE — Clinical Note
Level of Care: Telemetry [5]   Diagnosis: Hyperkalemia [190307]   Admitting Physician: RASHEL FUENTES [[id number]]   Attending Physician: RASHEL FUENTES [[id number]]   Certification: I Certify That Inpatient Hospital Services Are Medically Necessary For Greater Than 2 Midnights

## 2023-08-26 NOTE — CONSULTS
[facility]  [address] [place] [address]. [id number]  T - [phone]  F - [phone]     Consultation         PATIENT  DEMOGRAPHICS   PATIENT NAME: Yamileth Sylvester Bryon                      PHYSICIAN: BRIDGETT Sapp  : 1960  MRN: [id number]    Subjective   SUBJECTIVE   Referring Provider: Dr. Oviedo  Reason for Consultation: Hyperkalemia, HD  History of present illness:  63-year-old female presented to the ER after missing HD on Thursday and today. During evaluation it was noted that potassium is 8.8.  Nephrology was consulted for HD.    Past Medical History:   Diagnosis Date    Acute blood loss anemia 2017    Likely due to gastric oozing at this time. - Dr. Duarte (GI) was consulted and has now signed off, will follow up outpatient - pill colonoscopy showed AVMs - continue to monitor    Acute cystitis with hematuria 2021: IV Rocephin 1 gm q 24  : transitioned  to omnicef 300mg. Urine cultures resulted and did not show growth. Omnicef discontinued as patient is asymptomatic    Altered mental status 2022    - AMS on presentation - initial ABG pH 7.3, CO2 34 - Procal 0.29 - UA negative for acute cysitits -CTA head wnl  - Empiric Zosyn and Vancomycin -Lactate 2.5 on admission  - blood cultures no growth at 24 hours      Anxiety     CAD (coronary artery disease) 2021    S/P 3 stents 2021 for [facility] Continue ASA 81mg & Clopidogrel 75mg Continue Atorvastatin 40mg    Carotid artery stenosis     CHF (congestive heart failure)     Chronic obstructive lung disease     CKD (chronic kidney disease) stage 4, GFR 15-29 ml/min     CKD (chronic kidney disease), symptom management only, stage 5 10/05/2020    Results from last 7 days Lab Units 12/15/21 0548 21 1323 21 0916 CREATININE mg/dL 3.92* 3.21* 3.32*  Baseline creatinine 2-3 GFR 13-25 GFR 15 Dialysis MWF, sees Dr. Lauren Nephrology consult,, appreciate recommendations Continue Bumex 1mg bid  daily Holding Bumex 2mg 4 times a week    Colonic polyp     Coronary arteriosclerosis     Diabetes mellitus     Diabetic neuropathy     Ear pain, right 10/18/2021    - canal trauma due to patient scratching and DMT2 - added cortisporin ear drops    Elevated troponin 10/12/2021    -most likely from CKD -Trending down -Neg chest pain    Generalized abdominal pain 07/01/2022    Could be due to initiation of tube feeds vs dyspepsia vs abdominal cramps related to no PO intake due to intubation vs constipation Continue current laxative regiment  If no bowel movement by this afternoon will consider enema    GERD (gastroesophageal reflux disease)     GI bleed 05/13/2021    - GI will follow up outpatient - Protonix 40mg daily - Avoid medical DVT prophy and use mechanical at this time instead. - Continue to monitor - pill colonoscopy results showed AVMs    History of transfusion     Hypercholesterolemia     Hyperosmolar hyperglycemic state (HHS) 06/25/2022    Serum glucose 605 on admission  Anion gap 16 PH 7.37 Bicarb 27.9 Continue fluids  Insulin drip with HHS protocol  Anion gap closed around 10 AM, received one dose of Levamir subq, will stop insulin drip after 2 hrs      Hypertension     Hypokalemia 05/27/2022    Will replace as needed. Will be cautious in the setting of ESRD to avoid need for emergency dialysis   Monitor Qtc intervals on EKG      Hypomagnesemia 06/27/2021    Monitor and replace    Morbid obesity     Nephrolithiasis     On mechanically assisted ventilation 06/26/2022    Vent management and sedation orders placed.  - [facility] intensivist group consulted for vent management appreciate recommendations  - plan to extubate today      Peripheral vascular disease     Pleural effusion on right 06/26/2022    CXR on 6/30/22 read as a small upper left pulmonary edema vs early pneumonia.  Last Echo 1/2022 EF 61-65 % Continue to monitor  Procal slightly improved, CRP improved On Linezolid and meropenum       PVD  (peripheral vascular disease)     SIRS (systemic inflammatory response syndrome) 01/09/2022    Admission  - WBC 17.78   -   - RR 16 - 1/10: VSS/wnl - CXR - Mild pulm edema - Blood cultures no growth at 24 hours  - Procalcitonin 0.29 - UA : glucose 1000, negative Leucocytes/nitrate - Empiric Zosyn and Vancomcyin     Sleep apnea     Substance abuse     Vitamin D deficiency      Past Surgical History:   Procedure Laterality Date    AMPUTATION DIGIT Right 2/27/2023    Procedure: Excision of right first metatarsal head, right second toe amputation;  Surgeon: Jean Oliveira DPM;  Location: [facility] OR;  Service: Podiatry;  Laterality: Right;    BELOW KNEE AMPUTATION Left 12/16/2022    Procedure: AMPUTATION BELOW KNEE, LEFT;  Surgeon: Huy White MD;  Location: [facility] OR;  Service: Orthopedics;  Laterality: Left;    CARDIAC CATHETERIZATION N/A 07/14/2020    CARDIAC CATHETERIZATION N/A 04/23/2021    Procedure: Left Heart Cath;  Surgeon: Melba Romo MD;  Location: [facility] CATH INVASIVE LOCATION;  Service: Cardiology;  Laterality: N/A;    CARDIAC CATHETERIZATION N/A 04/30/2021    Procedure: Percutaneous Coronary Intervention;  Surgeon: Russell Voss MD;  Location: [facility] CATH INVASIVE LOCATION;  Service: Cardiovascular;  Laterality: N/A;    CARDIAC CATHETERIZATION N/A 04/30/2021    Procedure: Stent NIKKI coronary;  Surgeon: Russell Voss MD;  Location: [facility] CATH INVASIVE LOCATION;  Service: Cardiovascular;  Laterality: N/A;    CARDIAC CATHETERIZATION Left 11/13/2021    Procedure: Left Heart Cath;  Surgeon: Niall Rios MD;  Location: [facility] CATH INVASIVE LOCATION;  Service: Cardiology;  Laterality: Left;    CAROTID STENT Left     COLONOSCOPY      COLONOSCOPY N/A 05/14/2021    Procedure: COLONOSCOPY;  Surgeon: Mingo Duarte MD;  Location: [facility] ENDOSCOPY;  Service: Gastroenterology;  Laterality: N/A;    CORONARY ARTERY BYPASS GRAFT N/A 2013    CABG X 3    CYSTOSCOPY BLADDER  STONE LITHOTRIPSY Bilateral     ENDOSCOPY N/A 04/12/2021    Procedure: ESOPHAGOGASTRODUODENOSCOPY;  Surgeon: Mingo Duarte MD;  Location: [facility] ENDOSCOPY;  Service: Gastroenterology;  Laterality: N/A;    ENDOSCOPY N/A 05/14/2021    Procedure: ESOPHAGOGASTRODUODENOSCOPY;  Surgeon: Mingo Duarte MD;  Location: [facility] ENDOSCOPY;  Service: Gastroenterology;  Laterality: N/A;    ENDOSCOPY N/A 01/28/2022    Procedure: ESOPHAGOGASTRODUODENOSCOPY;  Surgeon: Mingo Duarte MD;  Location: [facility] ENDOSCOPY;  Service: Gastroenterology;  Laterality: N/A;    HYSTERECTOMY      INTERVENTIONAL RADIOLOGY PROCEDURE N/A 10/21/2021    Procedure: tunneled central venous catheter placement;  Surgeon: Donnie Robles MD;  Location: [facility] INVASIVE LOCATION;  Service: Interventional Radiology;  Laterality: N/A;    INTERVENTIONAL RADIOLOGY PROCEDURE N/A 01/27/2022    Procedure: tunneled central venous catheter placement;  Surgeon: Donnie Robles MD;  Location: [facility] INVASIVE LOCATION;  Service: Interventional Radiology;  Laterality: N/A;    LAPAROSCOPIC TUBAL LIGATION      TUNNELED VENOUS CATHETER PLACEMENT       Family History   Problem Relation Age of Onset    Heart disease Mother     Lung cancer Mother     Heart disease Father     Heart attack Father     Diabetes Father     Heart disease Half-Sister         Dad's side    Heart disease Brother     No Known Problems Sister     No Known Problems Sister     No Known Problems Sister     No Known Problems Sister     No Known Problems Sister     Pancreatic cancer Half-Sister         Dad's side    No Known Problems Brother     No Known Problems Brother     Heart attack Half-Brother     Heart attack Half-Brother     No Known Problems Maternal Grandmother     No Known Problems Maternal Grandfather     No Known Problems Paternal Grandmother     No Known Problems Paternal Grandfather      Social History     Tobacco Use    Smoking status: Some Days      "Packs/day: 0.25     Years: 46.00     Pack years: 11.50     Types: Cigarettes     Start date: 2/1/1999    Smokeless tobacco: Never   Vaping Use    Vaping Use: Every day    Substances: Nicotine    Devices: Disposable   Substance Use Topics    Alcohol use: No    Drug use: Not Currently     Types: Marijuana     Allergies:  Adhesive tape, Nsaids, Latex, Other, and Wound dressing adhesive     REVIEW OF SYSTEMS    Review of Systems   All other systems reviewed and are negative.    Objective   OBJECTIVE   Vital Signs  Temp:  [97.3 °F (36.3 °C)] 97.3 °F (36.3 °C)  Heart Rate:  [50-65] 57  Resp:  [16-19] 19  BP: (161-208)/(72-87) 173/77    Flowsheet Rows      Flowsheet Row First Filed Value   Admission Height 160 cm (62.99\") Documented at 08/26/2023 0957   Admission Weight 113 kg (250 lb) Documented at 08/26/2023 0957             No intake/output data recorded.    PHYSICAL EXAM    Physical Exam  Vitals reviewed.   Constitutional:       General: She is in acute distress.      Appearance: She is well-developed. She is ill-appearing.   HENT:      Head: Normocephalic and atraumatic.   Eyes:      Conjunctiva/sclera: Conjunctivae normal.      Pupils: Pupils are equal, round, and reactive to light.   Cardiovascular:      Rate and Rhythm: Normal rate and regular rhythm.   Pulmonary:      Effort: Pulmonary effort is normal.   Abdominal:      Palpations: Abdomen is soft.   Musculoskeletal:      Cervical back: Neck supple.      Right lower leg: No edema.      Left lower leg: No edema.   Skin:     General: Skin is warm and dry.   Neurological:      Mental Status: She is alert and oriented to person, place, and time.   Psychiatric:         Mood and Affect: Mood normal.         Behavior: Behavior normal.       RESULTS   Results Review:    Results from last 7 days   Lab Units 08/26/23  1152   SODIUM mmol/L 133*   POTASSIUM mmol/L 8.8*   CHLORIDE mmol/L 101   CO2 mmol/L 18.0*   BUN mg/dL 73*   CREATININE mg/dL 6.75*   CALCIUM mg/dL 9.7 "   BILIRUBIN mg/dL 0.4   ALK PHOS U/L 212*   ALT (SGPT) U/L 9   AST (SGOT) U/L 14   GLUCOSE mg/dL 109*       Estimated Creatinine Clearance: 10.2 mL/min (A) (by C-G formula based on SCr of 6.75 mg/dL (H)).    Results from last 7 days   Lab Units 08/26/23  1041   MAGNESIUM mg/dL 2.6*             Results from last 7 days   Lab Units 08/26/23  1041   WBC 10*3/mm3 9.36   HEMOGLOBIN g/dL 9.8*   PLATELETS 10*3/mm3 164              MEDICATIONS    albuterol, 2.5 mg, Nebulization, Q15 Min         (Not in a hospital admission)    Assessment & Plan   ASSESSMENT / PLAN      Hyperkalemia    ESRD on HD: TTS at [facility]. missed HD tx on Thursday and today. Will do emergent HD today due to hyperkalemia.    2.   Hyperkalemia : potassium 8.8- has widen QRS and bradycardia, Added kayexalate, calcium gluconate, insulin, D50 for potassium shift.     3.    Metabolic acidosis- will manage with HD    4.   DM2    5.   CAD    6.    CKD-MBD    7.    Hypertension    8.   Hyponatremia: mild, will monitor       I discussed the patients findings and my recommendations with patient      This document has been electronically signed by BRIDGETT Sapp on August 26, 2023 13:12 CDT

## 2023-08-27 ENCOUNTER — HOME CARE VISIT (OUTPATIENT)
Dept: HOME HEALTH SERVICES | Facility: HOME HEALTHCARE | Age: 63
End: 2023-08-27
Payer: COMMERCIAL

## 2023-08-27 ENCOUNTER — READMISSION MANAGEMENT (OUTPATIENT)
Dept: CALL CENTER | Facility: HOSPITAL | Age: 63
End: 2023-08-27
Payer: MEDICAID

## 2023-08-27 PROBLEM — Z91.158 NONCOMPLIANCE WITH RENAL DIALYSIS: Status: ACTIVE | Noted: 2023-08-27

## 2023-08-27 LAB
ANION GAP SERPL CALCULATED.3IONS-SCNC: 11 MMOL/L (ref 5–15)
BASOPHILS # BLD AUTO: 0.07 10*3/MM3 (ref 0–0.2)
BASOPHILS NFR BLD AUTO: 0.8 % (ref 0–1.5)
BUN SERPL-MCNC: 44 MG/DL (ref 8–23)
BUN/CREAT SERPL: 8.5 (ref 7–25)
CALCIUM SPEC-SCNC: 9 MG/DL (ref 8.6–10.5)
CHLORIDE SERPL-SCNC: 98 MMOL/L (ref 98–107)
CO2 SERPL-SCNC: 25 MMOL/L (ref 22–29)
CREAT SERPL-MCNC: 5.16 MG/DL (ref 0.57–1)
DEPRECATED RDW RBC AUTO: 54.1 FL (ref 37–54)
EGFRCR SERPLBLD CKD-EPI 2021: 8.8 ML/MIN/1.73
EOSINOPHIL # BLD AUTO: 0.14 10*3/MM3 (ref 0–0.4)
EOSINOPHIL NFR BLD AUTO: 1.6 % (ref 0.3–6.2)
ERYTHROCYTE [DISTWIDTH] IN BLOOD BY AUTOMATED COUNT: 15.7 % (ref 12.3–15.4)
GLUCOSE BLDC GLUCOMTR-MCNC: 138 MG/DL (ref 70–130)
GLUCOSE BLDC GLUCOMTR-MCNC: 151 MG/DL (ref 70–130)
GLUCOSE BLDC GLUCOMTR-MCNC: 171 MG/DL (ref 70–130)
GLUCOSE BLDC GLUCOMTR-MCNC: 218 MG/DL (ref 70–130)
GLUCOSE SERPL-MCNC: 154 MG/DL (ref 65–99)
HBA1C MFR BLD: 5.4 % (ref 4.8–5.6)
HCT VFR BLD AUTO: 28.9 % (ref 34–46.6)
HGB BLD-MCNC: 9 G/DL (ref 12–15.9)
IMM GRANULOCYTES # BLD AUTO: 0.05 10*3/MM3 (ref 0–0.05)
IMM GRANULOCYTES NFR BLD AUTO: 0.6 % (ref 0–0.5)
LYMPHOCYTES # BLD AUTO: 0.97 10*3/MM3 (ref 0.7–3.1)
LYMPHOCYTES NFR BLD AUTO: 11 % (ref 19.6–45.3)
MAGNESIUM SERPL-MCNC: 1.8 MG/DL (ref 1.6–2.4)
MCH RBC QN AUTO: 29.3 PG (ref 26.6–33)
MCHC RBC AUTO-ENTMCNC: 31.1 G/DL (ref 31.5–35.7)
MCV RBC AUTO: 94.1 FL (ref 79–97)
MONOCYTES # BLD AUTO: 0.52 10*3/MM3 (ref 0.1–0.9)
MONOCYTES NFR BLD AUTO: 5.9 % (ref 5–12)
NEUTROPHILS NFR BLD AUTO: 7.09 10*3/MM3 (ref 1.7–7)
NEUTROPHILS NFR BLD AUTO: 80.1 % (ref 42.7–76)
NRBC BLD AUTO-RTO: 0 /100 WBC (ref 0–0.2)
PHOSPHATE SERPL-MCNC: 5.6 MG/DL (ref 2.5–4.5)
PLATELET # BLD AUTO: 171 10*3/MM3 (ref 140–450)
PMV BLD AUTO: 8.6 FL (ref 6–12)
POTASSIUM SERPL-SCNC: 5.6 MMOL/L (ref 3.5–5.2)
POTASSIUM SERPL-SCNC: 5.9 MMOL/L (ref 3.5–5.2)
QT INTERVAL: 388 MS
QTC INTERVAL: 430 MS
RBC # BLD AUTO: 3.07 10*6/MM3 (ref 3.77–5.28)
SODIUM SERPL-SCNC: 134 MMOL/L (ref 136–145)
WBC NRBC COR # BLD: 8.84 10*3/MM3 (ref 3.4–10.8)
WHOLE BLOOD HOLD SPECIMEN: NORMAL

## 2023-08-27 PROCEDURE — 63710000001 INSULIN DETEMIR PER 5 UNITS

## 2023-08-27 PROCEDURE — 63710000001 INSULIN ASPART PER 5 UNITS: Performed by: INTERNAL MEDICINE

## 2023-08-27 PROCEDURE — 94799 UNLISTED PULMONARY SVC/PX: CPT

## 2023-08-27 PROCEDURE — 84132 ASSAY OF SERUM POTASSIUM: CPT

## 2023-08-27 PROCEDURE — 93005 ELECTROCARDIOGRAM TRACING: CPT

## 2023-08-27 PROCEDURE — 85025 COMPLETE CBC W/AUTO DIFF WBC: CPT

## 2023-08-27 PROCEDURE — 93010 ELECTROCARDIOGRAM REPORT: CPT | Performed by: INTERNAL MEDICINE

## 2023-08-27 PROCEDURE — 82948 REAGENT STRIP/BLOOD GLUCOSE: CPT

## 2023-08-27 PROCEDURE — 84100 ASSAY OF PHOSPHORUS: CPT

## 2023-08-27 PROCEDURE — 25010000002 HEPARIN (PORCINE) PER 1000 UNITS

## 2023-08-27 PROCEDURE — 83735 ASSAY OF MAGNESIUM: CPT

## 2023-08-27 PROCEDURE — 80048 BASIC METABOLIC PNL TOTAL CA: CPT

## 2023-08-27 PROCEDURE — 83036 HEMOGLOBIN GLYCOSYLATED A1C: CPT | Performed by: INTERNAL MEDICINE

## 2023-08-27 RX ORDER — INSULIN ASPART 100 [IU]/ML
0-9 INJECTION, SOLUTION INTRAVENOUS; SUBCUTANEOUS
Status: DISCONTINUED | OUTPATIENT
Start: 2023-08-27 | End: 2023-08-28 | Stop reason: HOSPADM

## 2023-08-27 RX ORDER — GLUCAGON 1 MG/ML
1 KIT INJECTION
Status: DISCONTINUED | OUTPATIENT
Start: 2023-08-27 | End: 2023-08-28 | Stop reason: HOSPADM

## 2023-08-27 RX ORDER — CARVEDILOL 12.5 MG/1
12.5 TABLET ORAL 2 TIMES DAILY WITH MEALS
Status: DISCONTINUED | OUTPATIENT
Start: 2023-08-27 | End: 2023-08-28 | Stop reason: HOSPADM

## 2023-08-27 RX ORDER — DEXTROSE MONOHYDRATE 25 G/50ML
25 INJECTION, SOLUTION INTRAVENOUS
Status: DISCONTINUED | OUTPATIENT
Start: 2023-08-27 | End: 2023-08-28 | Stop reason: HOSPADM

## 2023-08-27 RX ORDER — NICOTINE POLACRILEX 4 MG
15 LOZENGE BUCCAL
Status: DISCONTINUED | OUTPATIENT
Start: 2023-08-27 | End: 2023-08-28 | Stop reason: HOSPADM

## 2023-08-27 RX ADMIN — CYCLOBENZAPRINE 5 MG: 5 TABLET, FILM COATED ORAL at 07:55

## 2023-08-27 RX ADMIN — INSULIN DETEMIR 24 UNITS: 100 INJECTION, SOLUTION SUBCUTANEOUS at 21:00

## 2023-08-27 RX ADMIN — Medication 10 ML: at 21:00

## 2023-08-27 RX ADMIN — INSULIN DETEMIR 24 UNITS: 100 INJECTION, SOLUTION SUBCUTANEOUS at 09:02

## 2023-08-27 RX ADMIN — CARVEDILOL 6.25 MG: 6.25 TABLET, FILM COATED ORAL at 07:56

## 2023-08-27 RX ADMIN — SODIUM ZIRCONIUM CYCLOSILICATE 10 G: 10 POWDER, FOR SUSPENSION ORAL at 18:08

## 2023-08-27 RX ADMIN — LOSARTAN POTASSIUM 25 MG: 25 TABLET, FILM COATED ORAL at 07:55

## 2023-08-27 RX ADMIN — HEPARIN SODIUM 5000 UNITS: 5000 INJECTION INTRAVENOUS; SUBCUTANEOUS at 21:01

## 2023-08-27 RX ADMIN — CETIRIZINE HYDROCHLORIDE 10 MG: 10 TABLET, FILM COATED ORAL at 21:01

## 2023-08-27 RX ADMIN — DULOXETINE HYDROCHLORIDE 20 MG: 20 CAPSULE, DELAYED RELEASE ORAL at 07:56

## 2023-08-27 RX ADMIN — OXYCODONE HYDROCHLORIDE 10 MG: 10 TABLET ORAL at 17:11

## 2023-08-27 RX ADMIN — ATORVASTATIN CALCIUM 40 MG: 40 TABLET, FILM COATED ORAL at 07:56

## 2023-08-27 RX ADMIN — MEMANTINE 5 MG: 5 TABLET ORAL at 21:01

## 2023-08-27 RX ADMIN — PANTOPRAZOLE SODIUM 40 MG: 40 TABLET, DELAYED RELEASE ORAL at 21:01

## 2023-08-27 RX ADMIN — SEVELAMER CARBONATE 800 MG: 800 TABLET, FILM COATED ORAL at 07:56

## 2023-08-27 RX ADMIN — MEMANTINE 5 MG: 5 TABLET ORAL at 07:55

## 2023-08-27 RX ADMIN — SEVELAMER CARBONATE 800 MG: 800 TABLET, FILM COATED ORAL at 17:11

## 2023-08-27 RX ADMIN — CARVEDILOL 12.5 MG: 12.5 TABLET, FILM COATED ORAL at 17:11

## 2023-08-27 RX ADMIN — OXYCODONE HYDROCHLORIDE 10 MG: 10 TABLET ORAL at 21:01

## 2023-08-27 RX ADMIN — CLOPIDOGREL BISULFATE 75 MG: 75 TABLET ORAL at 07:55

## 2023-08-27 RX ADMIN — CYCLOBENZAPRINE 5 MG: 5 TABLET, FILM COATED ORAL at 21:01

## 2023-08-27 RX ADMIN — INSULIN ASPART 4 UNITS: 100 INJECTION, SOLUTION INTRAVENOUS; SUBCUTANEOUS at 12:21

## 2023-08-27 RX ADMIN — SODIUM ZIRCONIUM CYCLOSILICATE 10 G: 10 POWDER, FOR SUSPENSION ORAL at 12:20

## 2023-08-27 RX ADMIN — SEVELAMER CARBONATE 800 MG: 800 TABLET, FILM COATED ORAL at 12:20

## 2023-08-27 RX ADMIN — SODIUM ZIRCONIUM CYCLOSILICATE 10 G: 10 POWDER, FOR SUSPENSION ORAL at 20:54

## 2023-08-27 RX ADMIN — FUROSEMIDE 20 MG: 20 TABLET ORAL at 07:56

## 2023-08-27 NOTE — OUTREACH NOTE
Medical Week 2 Survey      Flowsheet Row Responses   [facility] patient discharged from? [facility]   Does the patient have one of the following disease processes/diagnoses(primary or secondary)? Other   Week 2 attempt successful? No   Unsuccessful attempts Attempt 1   Revoke Readmitted            Liz LIU - Registered Nurse

## 2023-08-27 NOTE — PROGRESS NOTES
"[facility]  [address] [place] [address]. [address]  T - [phone]  F - [phone]     Progress Note          PATIENT  DEMOGRAPHICS   PATIENT NAME: Yamileth Slater                      PHYSICIAN: BRIDGETT Sapp  : 1960  MRN: [id number]   LOS: 1 day    Patient Care Team:  Kiersten Wilson APRN as PCP - General (Family Medicine)  Subjective   SUBJECTIVE   No acute events overnight          Objective   OBJECTIVE   Vital Signs  Temp:  [97.3 °F (36.3 °C)-98.2 °F (36.8 °C)] 98.2 °F (36.8 °C)  Heart Rate:  [59-87] 70  Resp:  [16-20] 16  BP: (130-168)/(60-85) 135/60    Flowsheet Rows      Flowsheet Row First Filed Value   Admission Height 160 cm (62.99\") Documented at 2023 0957   Admission Weight 113 kg (250 lb) Documented at 2023 0957             I/O last 3 completed shifts:  In: -   Out:      PHYSICAL EXAM    Physical Exam  Vitals reviewed.   Constitutional:       Appearance: She is well-developed.   HENT:      Head: Normocephalic and atraumatic.   Eyes:      Conjunctiva/sclera: Conjunctivae normal.      Pupils: Pupils are equal, round, and reactive to light.   Cardiovascular:      Rate and Rhythm: Normal rate and regular rhythm.   Pulmonary:      Effort: Pulmonary effort is normal.      Breath sounds: Normal breath sounds.   Abdominal:      Palpations: Abdomen is soft.   Musculoskeletal:      Cervical back: Neck supple.      Right lower leg: No edema.      Left lower leg: No edema.   Skin:     General: Skin is warm and dry.   Neurological:      Mental Status: She is alert and oriented to person, place, and time.   Psychiatric:         Mood and Affect: Mood normal.         Behavior: Behavior normal.       RESULTS   Results Review:    Results from last 7 days   Lab Units 23  0254 23  1853 23  1152   SODIUM mmol/L 134* 133* 133*   POTASSIUM mmol/L 5.6* 5.1 8.8*   CHLORIDE mmol/L 98 97* 101   CO2 mmol/L 25.0 21.0* 18.0*   BUN mg/dL 44* 42* 73* " Routine safety standards initiated.  Routine nursing standards initiated.     CREATININE mg/dL 5.16* 4.44* 6.75*   CALCIUM mg/dL 9.0 9.1 9.7   BILIRUBIN mg/dL  --  0.5 0.4   ALK PHOS U/L  --  220* 212*   ALT (SGPT) U/L  --  10 9   AST (SGOT) U/L  --  20 14   GLUCOSE mg/dL 154* 135* 109*       Estimated Creatinine Clearance: 13 mL/min (A) (by C-G formula based on SCr of 5.16 mg/dL (H)).    Results from last 7 days   Lab Units 08/27/23  0254 08/26/23  1041   MAGNESIUM mg/dL 1.8 2.6*   PHOSPHORUS mg/dL 5.6*  --              Results from last 7 days   Lab Units 08/27/23 0254 08/26/23  1041   WBC 10*3/mm3 8.84 9.36   HEMOGLOBIN g/dL 9.0* 9.8*   PLATELETS 10*3/mm3 171 164               Imaging Results (Last 24 Hours)       Procedure Component Value Units Date/Time    CT Chest Without Contrast Diagnostic [[id number]] Collected: 08/26/23 2050     Updated: 08/26/23 2207    Narrative:        HISTORY:  Pneumonia suspected, uncomplicated, no prior imaging (Ped >= 3mo)    COMPARISON:  Same-day x-ray    TECHNIQUE:  Tomographic images of the chest were obtained without the administration of  intravenous contrast. Multiplanar reformatted images were provided for review.    FINDINGS:  Postprocedural changes of prior sternotomy. Grossly unremarkable appearance of  the soft tissues. Grossly unremarkable appearance of the partially visualized  upper abdomen. Scattered atheromatous calcifications of the great vessels and  coronary arteries. Scattered mosaic-like attenuation of the lungs as can be seen  with small airways disease. There is left greater than right dependent platelike  atelectasis.    IMPRESSIONS:  1. Left greater than right platelike atelectasis corresponding to findings on  recent prior x-ray.    2. Scattered diffuse mosaic-like attenuation of the lungs as can be seen with  small airways disease.             MEDICATIONS    atorvastatin, 40 mg, Oral, Daily  carvedilol, 12.5 mg, Oral, BID With Meals  cetirizine, 10 mg, Oral, Nightly  clopidogrel, 75 mg, Oral, Daily  cyclobenzaprine, 5 mg, Oral,  BID  docusate sodium, 100 mg, Oral, BID  DULoxetine, 20 mg, Oral, Daily  furosemide, 20 mg, Oral, Daily  gabapentin, 300 mg, Oral, Daily  heparin (porcine), 5,000 Units, Subcutaneous, Q12H  insulin detemir, 24 Units, Subcutaneous, Q12H  memantine, 5 mg, Oral, BID  oxyCODONE, 10 mg, Oral, TID  pantoprazole, 40 mg, Oral, Nightly  senna-docusate sodium, 2 tablet, Oral, BID  sevelamer, 800 mg, Oral, TID With Meals  sodium chloride, 10 mL, Intravenous, Q12H  sodium zirconium cyclosilicate, 10 g, Oral, TID           Assessment & Plan   ASSESSMENT / PLAN      Hyperkalemia    Noncompliance with renal dialysis    ESRD on HD: TTS at [facility]. missed HD tx on Thursday and saturday. Emergent Hd yesterday.    2.   Hyperkalemia : potassium down to 5.6 from 8.8- agree with lokelma 10g TID.  Initially had widen QRS and bradycardia, recieved kayexalate, calcium gluconate, insulin, D50 for potassium shift.      3.    Metabolic acidosis- will manage with HD     4.   DM2     5.   CAD     6.    CKD-MBD     7.    Hypertension     8.   Hyponatremia: mild, will monitor           This document has been electronically signed by BRIDGETT Sapp on August 27, 2023 13:58 CDT

## 2023-08-27 NOTE — PLAN OF CARE
Problem: Electrolyte Imbalance (Chronic Kidney Disease)  Goal: Electrolyte Balance  Outcome: Ongoing, Progressing     Problem: Fluid Volume Excess (Chronic Kidney Disease)  Goal: Fluid Balance  Outcome: Ongoing, Progressing   Goal Outcome Evaluation:      Hemodynamically stable during dialysis. Obtained ordered UF 2L. Accidental decannulation without harm to access. Dr Jacobo notified.

## 2023-08-27 NOTE — PROGRESS NOTES
[facility] Medicine Services  INPATIENT PROGRESS NOTE    Length of Stay: 1  Date of Admission: 8/26/2023  Primary Care Physician: Kiersten Wilson APRN    Subjective   Chief Complaint: severe hyperkalemia  HPI:  63-year-old female presented to the ER after missing HD on Thursday and today. During evaluation it was noted that potassium is 8.8. Alert, oriented, no SOB, no chest pain, no cold symptoms. Relevant past medical history: HTN(stable, increased risk stroke, rupture), Hyperlipidemia(stable, increased risk cardiovascular events), Diabetes Mellitus(stable, increased risk cardiovascular events), Obesity(uncontrolled, increased risk cardiovascular events) and Chronic Kidney Disease(stable, increased risk renal failure), COPD with chronic, O2 use over the last 3 years, coronary artery disease multiple interventions, peripheral vascular disease multiple distal intervention, poorly controlled diabetes with diabetic foot infection. history of MRSA CRE, on dialysis, poor follow-up.  former smoker.  Quit 1999.   Patient is medically complex, multiple hospitalizations over the past few years.  Multiple hospital admissions related to diabetic foot infections was transferred to [facility] in [facility] for infectious disease evaluation.  She subsequently underwent vascular evaluation and bilateral lower extremity arteriogram with intervention to both distal SFA and popliteal, she also went left transmetatarsal amputation at that time.  Surgical site dehiscence of left transmit site has had prolonged wound care with poor healing eventually required L BK which has healed performed in 12/2022.    2013 CABG x3  2014 attempted left carotid endarterectomy, procedure aborted unable to find carotid (Dr. Aguayo)  5/2014 L Transfemoral stent (Dr. Robles)  2014 arteriogram: PTA right SFA stent (Dr. Robles)  11/2021 [facility]: Distal left main ostial circumflex stenosis,  patent LIMA to LAD, 100% occluded RCA with occluded SVG to RCA  11/2021 PTA stent distal left main [facility]     1/2022 tunnel cath placement     9/15/2022 bilateral lower extremity arteriogram: PTA proximal SFA, PTA and stent stenosis right SFA, PTA/stent right popliteal.  Left SFA PTA stent Olevia 6 x 40 mm 6 x 120 mm due to dissection, in stent stenosis post intervention [facility]  9/19/2022 left transmetatarsal amputation [facility]  12/2022 LEFT BKA Dr. White    As of today, 08/27/23  Review of Systems   Constitutional:  Negative for activity change, appetite change, chills, diaphoresis and fatigue.   HENT:  Negative for congestion, ear discharge, hearing loss, rhinorrhea, sinus pain, sneezing and sore throat.    Eyes:  Negative for photophobia, discharge and visual disturbance.   Respiratory:  Negative for cough, chest tightness, shortness of breath and wheezing.    Cardiovascular:  Negative for chest pain and palpitations.   Gastrointestinal:  Negative for abdominal distention, abdominal pain, blood in stool, diarrhea, nausea and vomiting.   Endocrine: Negative for cold intolerance, heat intolerance, polydipsia, polyphagia and polyuria.   Genitourinary:  Negative for dysuria, flank pain, hematuria and urgency.   Musculoskeletal:  Negative for arthralgias, joint swelling and myalgias.   Skin:  Negative for color change.   Allergic/Immunologic: Negative for food allergies.   Neurological:  Negative for dizziness, seizures, syncope, speech difficulty, weakness and headaches.   Hematological:  Negative for adenopathy. Does not bruise/bleed easily.   Psychiatric/Behavioral:  Negative for confusion, hallucinations, self-injury and suicidal ideas. The patient is not nervous/anxious.       All pertinent negatives and positives are as above. All other systems have been reviewed and are negative unless otherwise stated.    Objective    Temp:  [97.3 °F (36.3 °C)-97.7 °F (36.5 °C)] 97.3 °F (36.3 °C)  Heart  Rate:  [50-87] 79  Resp:  [16-20] 18  BP: (130-177)/(62-87) 138/62    AM-PAC 6 Clicks Score (PT): 16 (08/26/23 2052)  Patient Vitals for the past 24 hrs:   BP Temp Temp src Pulse Resp SpO2 Weight   08/27/23 0752 -- -- -- 79 -- -- --   08/27/23 0306 138/62 97.3 °F (36.3 °C) Temporal 79 18 96 % 109 kg (240 lb 1.6 oz)   08/27/23 0139 -- -- -- 87 -- -- --   08/26/23 2052 168/70 97.5 °F (36.4 °C) Temporal 86 18 100 % --   08/26/23 1830 140/79 97.3 °F (36.3 °C) Tympanic 82 16 -- --   08/26/23 1810 152/74 -- -- 78 18 -- --   08/26/23 1800 144/68 -- -- 72 18 -- --   08/26/23 1730 130/85 -- -- 75 18 -- --   08/26/23 1713 -- -- -- 71 -- -- --   08/26/23 1700 159/70 -- -- 69 18 -- --   08/26/23 1630 158/85 -- -- 70 19 -- --   08/26/23 1600 153/73 -- -- 67 18 -- --   08/26/23 1530 161/68 -- -- 65 18 -- --   08/26/23 1515 135/63 -- -- 68 18 -- --   08/26/23 1500 157/65 -- -- 64 20 -- --   08/26/23 1440 132/62 -- -- 60 20 -- --   08/26/23 1409 -- -- -- 59 -- -- --   08/26/23 1359 147/65 97.7 °F (36.5 °C) Temporal -- 18 98 % --   08/26/23 1306 173/77 -- -- 57 19 97 % --   08/26/23 1306 -- -- -- 52 18 -- --   08/26/23 1256 173/77 -- -- 60 -- -- --   08/26/23 1245 161/87 -- -- 50 -- -- --   08/26/23 1145 169/73 -- -- 60 18 98 % --   08/26/23 1130 -- -- -- 61 19 -- --   08/26/23 1115 177/72 -- -- 62 -- 98 % --      As of today, 08/27/23  Physical Exam  Constitutional:       Appearance: Normal appearance.      Comments: Morbidly obese   HENT:      Head: Normocephalic and atraumatic.      Right Ear: Tympanic membrane normal.      Left Ear: Tympanic membrane normal.      Nose: Nose normal.      Mouth/Throat:      Mouth: Mucous membranes are moist.   Eyes:      Pupils: Pupils are equal, round, and reactive to light.   Cardiovascular:      Rate and Rhythm: Normal rate and regular rhythm.      Pulses: Normal pulses.      Heart sounds: Normal heart sounds.   Pulmonary:      Effort: Pulmonary effort is normal.      Breath sounds: Normal  breath sounds.   Abdominal:      General: Abdomen is flat. Bowel sounds are normal.      Palpations: Abdomen is soft.   Musculoskeletal:         General: Normal range of motion.      Cervical back: Normal range of motion and neck supple.      Comments: Transmetatarsal amputation right foot and BKA left leg  Right upper arm AV fistula      Skin:     General: Skin is warm and dry.      Capillary Refill: Capillary refill takes less than 2 seconds.   Neurological:      General: No focal deficit present.      Mental Status: She is alert and oriented to person, place, and time.   Psychiatric:         Mood and Affect: Mood normal.         Behavior: Behavior normal.         Thought Content: Thought content normal.         Judgment: Judgment normal.         Results Review:  I have reviewed the labs, radiology results, and diagnostic studies.    Laboratory Data:   Results from last 7 days   Lab Units 08/27/23 0254 08/26/23  1853 08/26/23  1152   SODIUM mmol/L 134* 133* 133*   POTASSIUM mmol/L 5.6* 5.1 8.8*   CHLORIDE mmol/L 98 97* 101   CO2 mmol/L 25.0 21.0* 18.0*   BUN mg/dL 44* 42* 73*   CREATININE mg/dL 5.16* 4.44* 6.75*   GLUCOSE mg/dL 154* 135* 109*   CALCIUM mg/dL 9.0 9.1 9.7   BILIRUBIN mg/dL  --  0.5 0.4   ALK PHOS U/L  --  220* 212*   ALT (SGPT) U/L  --  10 9   AST (SGOT) U/L  --  20 14   ANION GAP mmol/L 11.0 15.0 14.0     Estimated Creatinine Clearance: 13 mL/min (A) (by C-G formula based on SCr of 5.16 mg/dL (H)).  Results from last 7 days   Lab Units 08/27/23  0254 08/26/23  1041   MAGNESIUM mg/dL 1.8 2.6*   PHOSPHORUS mg/dL 5.6*  --          Results from last 7 days   Lab Units 08/27/23  0254 08/26/23  1041   WBC 10*3/mm3 8.84 9.36   HEMOGLOBIN g/dL 9.0* 9.8*   HEMATOCRIT % 28.9* 30.2*   PLATELETS 10*3/mm3 171 164           Culture Data:   No results found for: BLOODCX  No results found for: URINECX  No results found for: RESPCX  No results found for: WOUNDCX  No results found for: STOOLCX  No components found  for: BODYPending sale to [facility]    Radiology Data:   Imaging Results (Last 24 Hours)       Procedure Component Value Units Date/Time    CT Chest Without Contrast Diagnostic [[id number]] Collected: 08/26/23 2050     Updated: 08/26/23 2207    Narrative:        HISTORY:  Pneumonia suspected, uncomplicated, no prior imaging (Ped >= 3mo)    COMPARISON:  Same-day x-ray    TECHNIQUE:  Tomographic images of the chest were obtained without the administration of  intravenous contrast. Multiplanar reformatted images were provided for review.    FINDINGS:  Postprocedural changes of prior sternotomy. Grossly unremarkable appearance of  the soft tissues. Grossly unremarkable appearance of the partially visualized  upper abdomen. Scattered atheromatous calcifications of the great vessels and  coronary arteries. Scattered mosaic-like attenuation of the lungs as can be seen  with small airways disease. There is left greater than right dependent platelike  atelectasis.    IMPRESSIONS:  1. Left greater than right platelike atelectasis corresponding to findings on  recent prior x-ray.    2. Scattered diffuse mosaic-like attenuation of the lungs as can be seen with  small airways disease.            I have utilized all available immediate resources to obtain, update, or review the patient's current medications (including all prescriptions, over-the-counter products, herbals, cannabis/cannabidiol products, and vitamin/mineral/dietary (nutritional) supplements).       Assessment/Plan     Active Hospital Problems    Diagnosis     **Hyperkalemia        Assessment and Plan:     63 years old female patient with diabetes, vascular complications, transmetatarsal amputation right foot and BKA left leg, missed hemodialysis on Thursday and today, came with severe hyperkalemia 8.8, dialytic emergency, nephrologist on board. Potassium went down to 5.1 and now is 5.6, re start Lokelma and DC ARB. CT chest showed atelectasis  Problems:  Dialytic emergency, hyperkalemia,  pulmonary congestion  PVD  Mixed hyperlipidemia  Carotid stenosis  Type 2 diabetes  ESRD on  dialysis  Right foot wound    Medical Decision Making  Number and Complexity of problems: 4  Differential Diagnosis: dialytic emergency    Conditions and Status:        Condition is improving.     [facility] Data  External documents reviewed: previous medical records  My EKG interpretation: sinus, prolonged QT  My CT interpretation: chest  Tests considered but not ordered: none     Decision rules/scores evaluated (example [id number], Wells, etc): n/a     Discussed with: Dr Jacobo, nursing staff and patient     Treatment Plan  Lokelma  Potassium level at 4 pm  DC cozaar  Wound consult  New EKG  Appreciate nephrologist consult  Incentive spirometry  Am labs  Chest PT    Care Planning  Shared decision making: patient  Code status and discussions: full code    Disposition  Social Determinants of Health that impact treatment or disposition: none  I expect the patient to be discharged to home in 2 days.       I confirmed that the patient's Advance Care Plan is present, code status is documented, or surrogate decision maker is listed in the patient's medical record.      This document has been electronically signed by Parish Oviedo MD on August 27, 2023 10:48 CDT

## 2023-08-27 NOTE — NURSING NOTE
Potassium improved after dialysis, stool frequency has slowed. Voiding heavily. Pt drowsy, falls asleep during conversation. Ct of chest done this shift, results available. No no new complaints voiced.

## 2023-08-27 NOTE — PROGRESS NOTES
Asked by nursing to place sliding scale insulin orders for patient.  Also added Accu-Chek ACHS.    Blood Sugar in Office          7/29/2023    21:40 7/30/2023    07:10 7/30/2023    10:45 8/26/2023    20:58   Blood Sugar in Office   Glucose 237  158  238  176

## 2023-08-28 ENCOUNTER — READMISSION MANAGEMENT (OUTPATIENT)
Dept: CALL CENTER | Facility: HOSPITAL | Age: 63
End: 2023-08-28
Payer: MEDICAID

## 2023-08-28 ENCOUNTER — HOME CARE VISIT (OUTPATIENT)
Dept: HOME HEALTH SERVICES | Facility: HOME HEALTHCARE | Age: 63
End: 2023-08-28
Payer: COMMERCIAL

## 2023-08-28 VITALS
DIASTOLIC BLOOD PRESSURE: 79 MMHG | HEIGHT: 63 IN | TEMPERATURE: 97.8 F | OXYGEN SATURATION: 94 % | RESPIRATION RATE: 18 BRPM | BODY MASS INDEX: 44.77 KG/M2 | SYSTOLIC BLOOD PRESSURE: 168 MMHG | HEART RATE: 79 BPM | WEIGHT: 252.7 LBS

## 2023-08-28 LAB
ANION GAP SERPL CALCULATED.3IONS-SCNC: 15 MMOL/L (ref 5–15)
BASOPHILS # BLD AUTO: 0.08 10*3/MM3 (ref 0–0.2)
BASOPHILS NFR BLD AUTO: 1.6 % (ref 0–1.5)
BUN SERPL-MCNC: 52 MG/DL (ref 8–23)
BUN/CREAT SERPL: 8.2 (ref 7–25)
CALCIUM SPEC-SCNC: 9.2 MG/DL (ref 8.6–10.5)
CHLORIDE SERPL-SCNC: 98 MMOL/L (ref 98–107)
CO2 SERPL-SCNC: 22 MMOL/L (ref 22–29)
CREAT SERPL-MCNC: 6.31 MG/DL (ref 0.57–1)
DEPRECATED RDW RBC AUTO: 50.9 FL (ref 37–54)
EGFRCR SERPLBLD CKD-EPI 2021: 6.9 ML/MIN/1.73
EOSINOPHIL # BLD AUTO: 0.23 10*3/MM3 (ref 0–0.4)
EOSINOPHIL NFR BLD AUTO: 4.5 % (ref 0.3–6.2)
ERYTHROCYTE [DISTWIDTH] IN BLOOD BY AUTOMATED COUNT: 15.2 % (ref 12.3–15.4)
GLUCOSE BLDC GLUCOMTR-MCNC: 116 MG/DL (ref 70–130)
GLUCOSE SERPL-MCNC: 123 MG/DL (ref 65–99)
HCT VFR BLD AUTO: 27 % (ref 34–46.6)
HGB BLD-MCNC: 8.8 G/DL (ref 12–15.9)
IMM GRANULOCYTES # BLD AUTO: 0.01 10*3/MM3 (ref 0–0.05)
IMM GRANULOCYTES NFR BLD AUTO: 0.2 % (ref 0–0.5)
LYMPHOCYTES # BLD AUTO: 2.23 10*3/MM3 (ref 0.7–3.1)
LYMPHOCYTES NFR BLD AUTO: 43.8 % (ref 19.6–45.3)
MAGNESIUM SERPL-MCNC: 2.1 MG/DL (ref 1.6–2.4)
MCH RBC QN AUTO: 30.1 PG (ref 26.6–33)
MCHC RBC AUTO-ENTMCNC: 32.6 G/DL (ref 31.5–35.7)
MCV RBC AUTO: 92.5 FL (ref 79–97)
MONOCYTES # BLD AUTO: 0.52 10*3/MM3 (ref 0.1–0.9)
MONOCYTES NFR BLD AUTO: 10.2 % (ref 5–12)
NEUTROPHILS NFR BLD AUTO: 2.02 10*3/MM3 (ref 1.7–7)
NEUTROPHILS NFR BLD AUTO: 39.7 % (ref 42.7–76)
NRBC BLD AUTO-RTO: 0 /100 WBC (ref 0–0.2)
PHOSPHATE SERPL-MCNC: 8.3 MG/DL (ref 2.5–4.5)
PLATELET # BLD AUTO: 163 10*3/MM3 (ref 140–450)
PMV BLD AUTO: 8.9 FL (ref 6–12)
POTASSIUM SERPL-SCNC: 4.7 MMOL/L (ref 3.5–5.2)
RBC # BLD AUTO: 2.92 10*6/MM3 (ref 3.77–5.28)
SODIUM SERPL-SCNC: 135 MMOL/L (ref 136–145)
WBC NRBC COR # BLD: 5.09 10*3/MM3 (ref 3.4–10.8)

## 2023-08-28 PROCEDURE — 83735 ASSAY OF MAGNESIUM: CPT

## 2023-08-28 PROCEDURE — 80048 BASIC METABOLIC PNL TOTAL CA: CPT

## 2023-08-28 PROCEDURE — 84100 ASSAY OF PHOSPHORUS: CPT

## 2023-08-28 PROCEDURE — 63710000001 INSULIN DETEMIR PER 5 UNITS

## 2023-08-28 PROCEDURE — 82948 REAGENT STRIP/BLOOD GLUCOSE: CPT

## 2023-08-28 PROCEDURE — 85025 COMPLETE CBC W/AUTO DIFF WBC: CPT

## 2023-08-28 RX ORDER — CARVEDILOL 12.5 MG/1
12.5 TABLET ORAL 2 TIMES DAILY WITH MEALS
Qty: 180 TABLET | Refills: 6 | Status: SHIPPED | OUTPATIENT
Start: 2023-08-28

## 2023-08-28 RX ADMIN — ATORVASTATIN CALCIUM 40 MG: 40 TABLET, FILM COATED ORAL at 09:30

## 2023-08-28 RX ADMIN — CYCLOBENZAPRINE 5 MG: 5 TABLET, FILM COATED ORAL at 09:31

## 2023-08-28 RX ADMIN — Medication 10 ML: at 09:30

## 2023-08-28 RX ADMIN — MEMANTINE 5 MG: 5 TABLET ORAL at 09:37

## 2023-08-28 RX ADMIN — CLOPIDOGREL BISULFATE 75 MG: 75 TABLET ORAL at 09:31

## 2023-08-28 RX ADMIN — INSULIN DETEMIR 24 UNITS: 100 INJECTION, SOLUTION SUBCUTANEOUS at 09:36

## 2023-08-28 RX ADMIN — CARVEDILOL 12.5 MG: 12.5 TABLET, FILM COATED ORAL at 09:30

## 2023-08-28 RX ADMIN — SEVELAMER CARBONATE 800 MG: 800 TABLET, FILM COATED ORAL at 09:31

## 2023-08-28 RX ADMIN — GABAPENTIN 300 MG: 300 CAPSULE ORAL at 09:37

## 2023-08-28 RX ADMIN — OXYCODONE HYDROCHLORIDE 10 MG: 10 TABLET ORAL at 09:33

## 2023-08-28 RX ADMIN — DULOXETINE HYDROCHLORIDE 20 MG: 20 CAPSULE, DELAYED RELEASE ORAL at 09:30

## 2023-08-28 RX ADMIN — FUROSEMIDE 20 MG: 20 TABLET ORAL at 09:30

## 2023-08-28 NOTE — PAYOR COMM NOTE
"Komal Rodarte  [facility]  Case Management Extender  [phone] phone  [phone] fax      Ref# [id number]  Yamileth Slaterores (63 y.o. Female)       Date of Birth   1960    Social Security Number       Address   [address] [place] [address]    Home Phone   [phone]    MRN   [id number]       Judaism   None    Marital Status   Legally                             Admission Date   8/26/23    Admission Type   Emergency    Admitting Provider   Parish Oviedo MD    Attending Provider       Department, Room/Bed   [address], 429/1       Discharge Date   8/28/2023    Discharge Disposition   Home or Self Care    Discharge Destination                                 Attending Provider: (none)   Allergies: Adhesive Tape, Nsaids, Latex, Other, Wound Dressing Adhesive    Isolation: Contact   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), 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)   Code Status: CPR    Ht: 160 cm (62.99\")   Wt: 115 kg (252 lb 11.2 oz)    Admission Cmt: None   Principal Problem: Hyperkalemia [E87.5]                   Active Insurance as of 8/26/2023       Primary Coverage       Payor Plan Insurance Group Employer/Plan Group    ANTHEM MEDICARE REPLACEMENT ANTHEM MEDICARE ADVANTAGE [id number]       Payor Plan Address Payor Plan Phone Number Payor Plan Fax Number Effective Dates    [address] [phone]  1/1/2022 - None Entered    [facility] [id number]         Subscriber Name Subscriber Birth Date Member ID       ALEKSYAMILETH NGA 1960 [id number]               Secondary Coverage       Payor Plan Insurance Group Employer/Plan Group    [place]        Payor Plan Address Payor Plan Phone Number Payor Plan Fax Number Effective Dates    [address] [phone]  6/28/2019 - None Entered    [facility] [id number]         Subscriber Name Subscriber Birth Date Member ID       ALEKSYAMILETHFRANCINE CROOK " 1960 [id number]                     Emergency Contacts        (Rel.) Home Phone Work Phone Mobile Phone    RAMONA KHAN (Sister) [phone] -- [phone]    Yamileth Chavez (Daughter) [phone] -- [phone]    Suzanne Rivera (Daughter) [phone] -- [phone]                 History & Physical        Parish Oviedo MD at 08/26/23 1341                [facility] Medicine Admission      Date of Admission: 8/26/2023      Primary Care Physician: Kiersten Wilson APRN      Chief Complaint: hyperkalemia    HPI:63-year-old female presented to the ER after missing HD on Thursday and today. During evaluation it was noted that potassium is 8.8. Alert, oriented, no SOB, no chest pain, no cold symptoms. Relevant past medical history: HTN(stable, increased risk stroke, rupture), Hyperlipidemia(stable, increased risk cardiovascular events), Diabetes Mellitus(stable, increased risk cardiovascular events), Obesity(uncontrolled, increased risk cardiovascular events) and Chronic Kidney Disease(stable, increased risk renal failure), COPD with chronic, O2 use over the last 3 years, coronary artery disease multiple interventions, peripheral vascular disease multiple distal intervention, poorly controlled diabetes with diabetic foot infection. history of MRSA CRE, on dialysis, poor follow-up.  former smoker.  Quit 1999.   Patient is medically complex, multiple hospitalizations over the past few years.  Multiple hospital admissions related to diabetic foot infections was transferred to [facility] in [facility] for infectious disease evaluation.  She subsequently underwent vascular evaluation and bilateral lower extremity arteriogram with intervention to both distal SFA and popliteal, she also went left transmetatarsal amputation at that time.  Surgical site dehiscence of left transmit site has had prolonged wound care with poor healing eventually  required L BK which has healed performed in 12/2022.    2013 CABG x3  2014 attempted left carotid endarterectomy, procedure aborted unable to find carotid (Dr. Aguayo)  5/2014 L Transfemoral stent (Dr. Robles)  2014 arteriogram: PTA right SFA stent (Dr. Robles)  11/2021 LHC: Distal left main ostial circumflex stenosis, patent LIMA to LAD, 100% occluded RCA with occluded SVG to RCA  11/2021 PTA stent distal left main [facility]     1/2022 tunnel cath placement     9/15/2022 bilateral lower extremity arteriogram: PTA proximal SFA, PTA and stent stenosis right SFA, PTA/stent right popliteal.  Left SFA PTA stent Olevia 6 x 40 mm 6 x 120 mm due to dissection, in stent stenosis post intervention [facility]  9/19/2022 left transmetatarsal amputation [facility]  12/2022 LEFT BKA Dr. White          Past Medical History:   Diagnosis Date    Acute blood loss anemia 04/16/2017    Likely due to gastric oozing at this time. - Dr. Duarte (GI) was consulted and has now signed off, will follow up outpatient - pill colonoscopy showed AVMs - continue to monitor    Acute cystitis with hematuria 03/31/2021 1/13: IV Rocephin 1 gm q 24 1/14 : transitioned  to omnicef 300mg. Urine cultures resulted and did not show growth. Omnicef discontinued as patient is asymptomatic    Altered mental status 01/09/2022    - AMS on presentation - initial ABG pH 7.3, CO2 34 - Procal 0.29 - UA negative for acute cysitits -CTA head wnl  - Empiric Zosyn and Vancomycin -Lactate 2.5 on admission  - blood cultures no growth at 24 hours      Anxiety     CAD (coronary artery disease) 04/24/2021    S/P 3 stents 5/1/2021 for [facility] Continue ASA 81mg & Clopidogrel 75mg Continue Atorvastatin 40mg    Carotid artery stenosis     CHF (congestive heart failure)     Chronic obstructive lung disease     CKD (chronic kidney disease) stage 4, GFR 15-29 ml/min     CKD (chronic kidney disease), symptom management only, stage 5 10/05/2020    Results from last  7 days Lab Units 12/15/21 0548 12/14/21 1323 12/14/21 0916 CREATININE mg/dL 3.92* 3.21* 3.32*  Baseline creatinine 2-3 GFR 13-25 GFR 15 Dialysis MWF, sees Dr. Lauren Nephrology consult,, appreciate recommendations Continue Bumex 1mg bid daily Holding Bumex 2mg 4 times a week    Colonic polyp     Coronary arteriosclerosis     Diabetes mellitus     Diabetic neuropathy     Ear pain, right 10/18/2021    - canal trauma due to patient scratching and DMT2 - added cortisporin ear drops    Elevated troponin 10/12/2021    -most likely from CKD -Trending down -Neg chest pain    Generalized abdominal pain 07/01/2022    Could be due to initiation of tube feeds vs dyspepsia vs abdominal cramps related to no PO intake due to intubation vs constipation Continue current laxative regiment  If no bowel movement by this afternoon will consider enema    GERD (gastroesophageal reflux disease)     GI bleed 05/13/2021    - GI will follow up outpatient - Protonix 40mg daily - Avoid medical DVT prophy and use mechanical at this time instead. - Continue to monitor - pill colonoscopy results showed AVMs    History of transfusion     Hypercholesterolemia     Hyperosmolar hyperglycemic state (HHS) 06/25/2022    Serum glucose 605 on admission  Anion gap 16 PH 7.37 Bicarb 27.9 Continue fluids  Insulin drip with HHS protocol  Anion gap closed around 10 AM, received one dose of Levamir subq, will stop insulin drip after 2 hrs      Hypertension     Hypokalemia 05/27/2022    Will replace as needed. Will be cautious in the setting of ESRD to avoid need for emergency dialysis   Monitor Qtc intervals on EKG      Hypomagnesemia 06/27/2021    Monitor and replace    Morbid obesity     Nephrolithiasis     On mechanically assisted ventilation 06/26/2022    Vent management and sedation orders placed.  - [facility] intensivist group consulted for vent management appreciate recommendations  - plan to extubate today      Peripheral vascular disease      Pleural effusion on right 06/26/2022    CXR on 6/30/22 read as a small upper left pulmonary edema vs early pneumonia.  Last Echo 1/2022 EF 61-65 % Continue to monitor  Procal slightly improved, CRP improved On Linezolid and meropenum       PVD (peripheral vascular disease)     SIRS (systemic inflammatory response syndrome) 01/09/2022    Admission  - WBC 17.78   -   - RR 16 - 1/10: VSS/wnl - CXR - Mild pulm edema - Blood cultures no growth at 24 hours  - Procalcitonin 0.29 - UA : glucose 1000, negative Leucocytes/nitrate - Empiric Zosyn and Vancomcyin     Sleep apnea     Substance abuse     Vitamin D deficiency       Past Surgical History:   Procedure Laterality Date    AMPUTATION DIGIT Right 2/27/2023    Procedure: Excision of right first metatarsal head, right second toe amputation;  Surgeon: Jean Oliveira DPM;  Location: [facility];  Service: Podiatry;  Laterality: Right;    BELOW KNEE AMPUTATION Left 12/16/2022    Procedure: AMPUTATION BELOW KNEE, LEFT;  Surgeon: Huy White MD;  Location: [facility] OR;  Service: Orthopedics;  Laterality: Left;    CARDIAC CATHETERIZATION N/A 07/14/2020    CARDIAC CATHETERIZATION N/A 04/23/2021    Procedure: Left Heart Cath;  Surgeon: Melba Romo MD;  Location: [facility] CATH INVASIVE LOCATION;  Service: Cardiology;  Laterality: N/A;    CARDIAC CATHETERIZATION N/A 04/30/2021    Procedure: Percutaneous Coronary Intervention;  Surgeon: Russell Voss MD;  Location: [facility] CATH INVASIVE LOCATION;  Service: Cardiovascular;  Laterality: N/A;    CARDIAC CATHETERIZATION N/A 04/30/2021    Procedure: Stent NIKKI coronary;  Surgeon: Russell Voss MD;  Location: [facility] CATH INVASIVE LOCATION;  Service: Cardiovascular;  Laterality: N/A;    CARDIAC CATHETERIZATION Left 11/13/2021    Procedure: Left Heart Cath;  Surgeon: Niall Rios MD;  Location: [facility] CATH INVASIVE LOCATION;  Service: Cardiology;  Laterality: Left;    CAROTID STENT Left      COLONOSCOPY      COLONOSCOPY N/A 05/14/2021    Procedure: COLONOSCOPY;  Surgeon: Mingo Duarte MD;  Location: [facility] ENDOSCOPY;  Service: Gastroenterology;  Laterality: N/A;    CORONARY ARTERY BYPASS GRAFT N/A 2013    CABG X 3    CYSTOSCOPY BLADDER STONE LITHOTRIPSY Bilateral     ENDOSCOPY N/A 04/12/2021    Procedure: ESOPHAGOGASTRODUODENOSCOPY;  Surgeon: Mingo Duarte MD;  Location: [facility] ENDOSCOPY;  Service: Gastroenterology;  Laterality: N/A;    ENDOSCOPY N/A 05/14/2021    Procedure: ESOPHAGOGASTRODUODENOSCOPY;  Surgeon: Mingo Duarte MD;  Location: [facility] ENDOSCOPY;  Service: Gastroenterology;  Laterality: N/A;    ENDOSCOPY N/A 01/28/2022    Procedure: ESOPHAGOGASTRODUODENOSCOPY;  Surgeon: Mingo Duarte MD;  Location: [facility] ENDOSCOPY;  Service: Gastroenterology;  Laterality: N/A;    HYSTERECTOMY      INTERVENTIONAL RADIOLOGY PROCEDURE N/A 10/21/2021    Procedure: tunneled central venous catheter placement;  Surgeon: Donnie Robles MD;  Location: [facility] ANGIO INVASIVE LOCATION;  Service: Interventional Radiology;  Laterality: N/A;    INTERVENTIONAL RADIOLOGY PROCEDURE N/A 01/27/2022    Procedure: tunneled central venous catheter placement;  Surgeon: Donnie Robles MD;  Location: [facility]GIO INVASIVE LOCATION;  Service: Interventional Radiology;  Laterality: N/A;    LAPAROSCOPIC TUBAL LIGATION      TUNNELED VENOUS CATHETER PLACEMENT        Allergies   Allergen Reactions    Adhesive Tape Hives, Other (See Comments) and Rash    Nsaids Hives    Latex Other (See Comments) and Hives    Other Itching     Bandaids, MRSA, SURECLOSE    Wound Dressing Adhesive Hives and Rash      Social History     Socioeconomic History    Marital status: Legally      Spouse name: wesley dumont   Tobacco Use    Smoking status: Some Days     Packs/day: 0.25     Years: 46.00     Pack years: 11.50     Types: Cigarettes     Start date: 2/1/1999    Smokeless tobacco: Never   Vaping Use    Vaping  Use: Every day    Substances: Nicotine    Devices: Disposable   Substance and Sexual Activity    Alcohol use: No    Drug use: Not Currently     Types: Marijuana    Sexual activity: Defer        Concurrent Medical History:  has a past medical history of Acute blood loss anemia (04/16/2017), Acute cystitis with hematuria (03/31/2021), Altered mental status (01/09/2022), Anxiety, CAD (coronary artery disease) (04/24/2021), Carotid artery stenosis, CHF (congestive heart failure), Chronic obstructive lung disease, CKD (chronic kidney disease) stage 4, GFR 15-29 ml/min, CKD (chronic kidney disease), symptom management only, stage 5 (10/05/2020), Colonic polyp, Coronary arteriosclerosis, Diabetes mellitus, Diabetic neuropathy, Ear pain, right (10/18/2021), Elevated troponin (10/12/2021), Generalized abdominal pain (07/01/2022), GERD (gastroesophageal reflux disease), GI bleed (05/13/2021), History of transfusion, Hypercholesterolemia, Hyperosmolar hyperglycemic state (HHS) (06/25/2022), Hypertension, Hypokalemia (05/27/2022), Hypomagnesemia (06/27/2021), Morbid obesity, Nephrolithiasis, On mechanically assisted ventilation (06/26/2022), Peripheral vascular disease, Pleural effusion on right (06/26/2022), PVD (peripheral vascular disease), SIRS (systemic inflammatory response syndrome) (01/09/2022), Sleep apnea, Substance abuse, and Vitamin D deficiency.    Past Surgical History:  has a past surgical history that includes Colonoscopy; Carotid stent (Left); Cardiac catheterization (N/A, 07/14/2020); cystoscopy bladder stone lithotripsy (Bilateral); Esophagogastroduodenoscopy (N/A, 04/12/2021); Cardiac catheterization (N/A, 04/23/2021); Cardiac catheterization (N/A, 04/30/2021); Cardiac catheterization (N/A, 04/30/2021); Esophagogastroduodenoscopy (N/A, 05/14/2021); Colonoscopy (N/A, 05/14/2021); Coronary artery bypass graft (N/A, 2013); Interventional radiology procedure (N/A, 10/21/2021); Cardiac catheterization (Left,  11/13/2021); Interventional radiology procedure (N/A, 01/27/2022); Esophagogastroduodenoscopy (N/A, 01/28/2022); Hysterectomy; Laparoscopic tubal ligation; Tunneled venous catheter placement; Leg amputation, lower tibia/fibula (Left, 12/16/2022); and Finger amputation (Right, 2/27/2023).    Family History: family history includes Diabetes in her father; Heart attack in her father, half-brother, and half-brother; Heart disease in her brother, father, half-sister, and mother; Lung cancer in her mother; No Known Problems in her brother, brother, maternal grandfather, maternal grandmother, paternal grandfather, paternal grandmother, sister, sister, sister, sister, and sister; Pancreatic cancer in her half-sister.     Social History:  reports that she has been smoking cigarettes. She started smoking about 24 years ago. She has a 11.50 pack-year smoking history. She has never used smokeless tobacco. She reports that she does not currently use drugs after having used the following drugs: Marijuana. She reports that she does not drink alcohol.    Allergies:   Allergies   Allergen Reactions    Adhesive Tape Hives, Other (See Comments) and Rash    Nsaids Hives    Latex Other (See Comments) and Hives    Other Itching     Bandaids, MRSA, SURECLOSE    Wound Dressing Adhesive Hives and Rash       Medications:   Prior to Admission medications    Medication Sig Start Date End Date Taking? Authorizing Provider   albuterol (PROVENTIL) (2.5 MG/3ML) 0.083% nebulizer solution Inhale the contents of 1 vial by nebulization Every 4 (Four) Hours As Needed for Wheezing. 10/28/21   Hialy Mcneil MD   albuterol sulfate  (90 Base) MCG/ACT inhaler Inhale 2 puffs Every 4 (Four) Hours As Needed for Wheezing. 3/26/21   Nirmal Calvillo MD   atorvastatin (LIPITOR) 40 MG tablet Take 1 tablet by mouth Daily. Indications: High Amount of Fats in the Blood    ProviderCaio MD   Blood Glucose Monitoring Suppl ([facility] Blood Glucose  "Meter) w/Device kit 1 each 3 (Three) Times a Day. 10/9/20   Nirmal Calvillo MD   carvedilol (COREG) 6.25 MG tablet Take 1 tablet by mouth 2 (Two) Times a Day With Meals. 12/23/22   Jef Ramires MD   Cetirizine HCl 10 MG capsule Take 1 capsule by mouth Daily. Indications: Hayfever 11/4/21   Caio Thompson MD   clopidogrel (PLAVIX) 75 MG tablet Take 1 tablet by mouth Daily.  Patient taking differently: Take 1 tablet by mouth Daily. 3/26/21   Nirmal Calvillo MD   cyclobenzaprine (FLEXERIL) 5 MG tablet Take 1 tablet by mouth 2 (Two) Times a Day. Indications: Muscle Spasm 10/28/22   Kristie Kern DPM   Diclofenac Sodium (VOLTAREN) 1 % gel gel Apply 4 g topically to the appropriate area as directed 4 (Four) Times a Day As Needed (Ankle pain). 4/26/22   Kit Brar MD   docusate sodium (COLACE) 100 MG capsule Take 1 capsule by mouth 2 (Two) Times a Day. Indications: Constipation 1/1/21   Caio Thompson MD   DULoxetine (CYMBALTA) 20 MG capsule Take 1 capsule by mouth Daily. Indications: Disease of the Peripheral Nerves 1/1/21   Caio Thompson MD   Easy Touch Insulin Syringe 30G X 5/16\" 0.5 ML misc USE AS DIRECTED WITH LEVEMIR 12/1/21   Caio Thompson MD   EASY TOUCH PEN NEEDLES 31G X 8 MM misc  8/7/20   Caio Thompson MD   furosemide (LASIX) 20 MG tablet Take 1 tablet by mouth Daily. Indications: Edema    Caio Thompson MD   gabapentin (NEURONTIN) 300 MG capsule Take 1 capsule by mouth Daily for 3 days. 3/16/23 3/19/23  Esau Ferguson MD   hydrOXYzine (ATARAX) 25 MG tablet Take 1 tablet by mouth Every 8 (Eight) Hours As Needed for Itching. Indications: Itching 1/1/21   Caio Thompson MD   insulin detemir (LEVEMIR) 100 UNIT/ML injection Inject 24 Units under the skin into the appropriate area as directed 2 (Two) Times a Day. pt takes in the am and pm  Indications: T2DM 4/12/23   Provider, MD Caio   Insulin Lispro, 1 Unit Dial, " (HUMALOG) 100 UNIT/ML solution pen-injector Inject 14-35 Units under the skin into the appropriate area as directed 3 (Three) Times a Day With Meals. Takes 14 units with meals plus sliding scale  Sliding scale:   150-199 take 4 units  200-249 take 8 units  250-299 take 12 units  300-349 take 16 units  350-400 take 20 units  greater than 400, call physician 12/23/22   Jef Ramires MD   Insulin Pen Needle (NovoFine) 30G X 8 MM misc As directed 4 times daily 3/26/21   Nirmal Calvillo MD   Insulin Pen Needle 31G X 8 MM misc Use to inject insulin 4 (Four) Times a Day as directed. 10/28/21   Haily Mcneil MD   ipratropium-albuterol (DUO-NEB) 0.5-2.5 mg/3 ml nebulizer Take 3 mL by nebulization 4 (Four) Times a Day As Needed. Indications: Chronic Obstructive Lung Disease 3/22/17   Caio Thompson MD   losartan (COZAAR) 25 MG tablet Take 1 tablet by mouth Daily. 12/24/22   Jef Ramires MD   memantine (NAMENDA) 5 MG tablet Take 1 tablet by mouth 2 (Two) Times a Day. Indications: Cognitive Dysfunction 10/28/22   Kristie Kern DPM   nitroglycerin (NITROSTAT) 0.4 MG SL tablet Place 1 tablet under the tongue Every 5 (Five) Minutes As Needed for Chest Pain (x 3 doses). Indications: Acute Angina Pectoris 1/1/15   Caio Thompson MD   O2 (OXYGEN) Inhale 2 L/min Continuous. Only uses when patient takes Trilogy  Indications: SOA 1/1/21   Caio Thompson MD   ondansetron (ZOFRAN) 4 MG tablet Take 1 tablet by mouth Every 6 (Six) Hours As Needed for Nausea or Vomiting. 12/23/22   Jef Ramires MD   oxyCODONE (ROXICODONE) 10 MG tablet Take 1 tablet by mouth 3 (Three) Times a Day. Indications: Acute Pain, Chronic Pain 4/14/23   Caio Thompson MD   pantoprazole (PROTONIX) 40 MG EC tablet Take 1 tablet by mouth Every Night. 4/26/22   Kit Brar MD   promethazine (PHENERGAN) 25 MG tablet Take 1 tablet by mouth Every 6 (Six) Hours As Needed for Nausea or Vomiting. Indications:  Nausea and Vomiting 11/4/22   Caio Thompson MD   sevelamer (RENVELA) 800 MG tablet Take 1 tablet by mouth 3 (Three) Times a Day With Meals. Indications: High Amount of Phosphate in the Blood 1/26/22   Caio Thompson MD   Vitamin D, Ergocalciferol, 56832 units capsule Take 1 capsule by mouth 1 (One) Time Per Week. Take on Wednesday  Indications: Kidney Failure Syndrome 1/1/21   Caio Thompson MD   insulin aspart (novoLOG FLEXPEN) 100 UNIT/ML solution pen-injector sc pen Inject 30 Units under the skin into the appropriate area as directed 3 (Three) Times a Day With Meals. 2/1/22 3/17/22  Blake Burris MD     I have utilized all available immediate resources to obtain, update, or review the patient's current medications (including all prescriptions, over-the-counter products, herbals, cannabis/cannabidiol products, and vitamin/mineral/dietary (nutritional) supplements).      Review of Systems:  Review of Systems   Constitutional:  Negative for activity change, appetite change, chills, diaphoresis and fatigue.   HENT:  Negative for congestion, ear discharge, hearing loss, rhinorrhea, sinus pain, sneezing and sore throat.    Eyes:  Negative for photophobia, discharge and visual disturbance.   Respiratory:  Negative for cough, chest tightness, shortness of breath and wheezing.    Cardiovascular:  Negative for chest pain and palpitations.   Gastrointestinal:  Negative for abdominal distention, abdominal pain, blood in stool, diarrhea, nausea and vomiting.   Endocrine: Negative for cold intolerance, heat intolerance, polydipsia, polyphagia and polyuria.   Genitourinary:  Negative for dysuria, flank pain, hematuria and urgency.   Musculoskeletal:  Negative for arthralgias, joint swelling and myalgias.   Skin:  Negative for color change.   Allergic/Immunologic: Negative for food allergies.   Neurological:  Negative for dizziness, seizures, syncope, speech difficulty, weakness and headaches.    Hematological:  Negative for adenopathy. Does not bruise/bleed easily.   Psychiatric/Behavioral:  Negative for confusion, hallucinations, self-injury and suicidal ideas. The patient is not nervous/anxious.     Otherwise complete ROS is negative except as mentioned above.    Physical Exam:   Temp:  [97.3 °F (36.3 °C)] 97.3 °F (36.3 °C)  Heart Rate:  [50-65] 57  Resp:  [16-19] 19  BP: (161-208)/(72-87) 173/77  Physical Exam  Constitutional:       Appearance: Normal appearance. She is ill-appearing.   HENT:      Head: Normocephalic and atraumatic.      Comments: Face edema     Right Ear: Tympanic membrane normal.      Left Ear: Tympanic membrane normal.      Nose: Nose normal.      Mouth/Throat:      Mouth: Mucous membranes are moist.   Eyes:      Pupils: Pupils are equal, round, and reactive to light.   Cardiovascular:      Rate and Rhythm: Normal rate and regular rhythm.      Pulses: Normal pulses.      Heart sounds: Normal heart sounds.   Pulmonary:      Effort: Pulmonary effort is normal.      Breath sounds: Normal breath sounds.   Abdominal:      General: Abdomen is flat. Bowel sounds are normal.      Palpations: Abdomen is soft.   Musculoskeletal:         General: Normal range of motion.      Cervical back: Normal range of motion and neck supple.      Comments: Transmetatarsal amputation right foot and BKA left leg  Right upper arm AV fistula   Skin:     General: Skin is warm and dry.      Capillary Refill: Capillary refill takes less than 2 seconds.   Neurological:      General: No focal deficit present.      Mental Status: She is alert and oriented to person, place, and time.   Psychiatric:         Mood and Affect: Mood normal.         Behavior: Behavior normal.         Thought Content: Thought content normal.         Judgment: Judgment normal.         Results Reviewed:  I have personally reviewed current lab, radiology, and data and agree with results.  Lab Results (last 24 hours)       Procedure Component  Value Units Date/Time    Comprehensive Metabolic Panel [[id number]]  (Abnormal) Collected: 08/26/23 1152    Specimen: Blood Updated: 08/26/23 1243     Glucose 109 mg/dL      BUN 73 mg/dL      Creatinine 6.75 mg/dL      Sodium 133 mmol/L      Potassium 8.8 mmol/L      Comment: Confirmed by repeat analysis          Chloride 101 mmol/L      CO2 18.0 mmol/L      Calcium 9.7 mg/dL      Total Protein 8.4 g/dL      Albumin 3.7 g/dL      ALT (SGPT) 9 U/L      AST (SGOT) 14 U/L      Alkaline Phosphatase 212 U/L      Total Bilirubin 0.4 mg/dL      Globulin 4.7 gm/dL      A/G Ratio 0.8 g/dL      BUN/Creatinine Ratio 10.8     Anion Gap 14.0 mmol/L      eGFR 6.4 mL/min/1.73      Comment: <15 Indicative of kidney failure       Narrative:      GFR Normal >60  Chronic Kidney Disease <60  Kidney Failure <15      Magnesium [[id number]]  (Abnormal) Collected: 08/26/23 1041    Specimen: Blood Updated: 08/26/23 1138     Magnesium 2.6 mg/dL     BNP [[id number]]  (Abnormal) Collected: 08/26/23 1041    Specimen: Blood Updated: 08/26/23 1130     proBNP 3,471.0 pg/mL     Narrative:      Among patients with dyspnea, NT-proBNP is highly sensitive for the detection of acute congestive heart failure. In addition NT-proBNP of <300 pg/ml effectively rules out acute congestive heart failure with 99% negative predictive value.      CBC & Differential [[id number]]  (Abnormal) Collected: 08/26/23 1041    Specimen: Blood Updated: 08/26/23 1102    Narrative:      The following orders were created for panel order CBC & Differential.  Procedure                               Abnormality         Status                     ---------                               -----------         ------                     CBC Auto Differential[[id number]]        Abnormal            Final result                 Please view results for these tests on the individual orders.    CBC Auto Differential [[id number]]  (Abnormal) Collected: 08/26/23 1041    Specimen: Blood Updated:  08/26/23 1102     WBC 9.36 10*3/mm3      RBC 3.24 10*6/mm3      Hemoglobin 9.8 g/dL      Hematocrit 30.2 %      MCV 93.2 fL      MCH 30.2 pg      MCHC 32.5 g/dL      RDW 15.9 %      RDW-SD 54.4 fl      MPV 8.8 fL      Platelets 164 10*3/mm3      Neutrophil % 65.2 %      Lymphocyte % 26.2 %      Monocyte % 5.2 %      Eosinophil % 1.9 %      Basophil % 1.1 %      Immature Grans % 0.4 %      Neutrophils, Absolute 6.10 10*3/mm3      Lymphocytes, Absolute 2.45 10*3/mm3      Monocytes, Absolute 0.49 10*3/mm3      Eosinophils, Absolute 0.18 10*3/mm3      Basophils, Absolute 0.10 10*3/mm3      Immature Grans, Absolute 0.04 10*3/mm3      nRBC 0.0 /100 WBC           Imaging Results (Last 24 Hours)       Procedure Component Value Units Date/Time    XR Chest 1 View [[id number]] Collected: 08/26/23 1033     Updated: 08/26/23 1038    Narrative:      Comparison:  5/11/2023    FINDINGS:  Stable cardiomegaly.  Minimal patchy left suprahilar airspace opacity may  represent atelectasis or pneumonia in the appropriate setting.  No pleural  effusion or pneumothorax.                Assessment:    Active Hospital Problems    Diagnosis     **Hyperkalemia              Plan: 63 years old female patient with diabetes, vascular complications, transmetatarsal amputation right foot and BKA left leg, missed hemodialysis on Thursday and today, came with severe hyperkalemia 8.8, dialytic emergency, nephrologist on board.  Problems:  Dialytic emergency, hyperkalemia, pulmonary congestion  PVD  Mixed hyperlipidemia  Carotid stenosis  Type 2 diabetes  ESRD on  dialysis  Medical Decision Making  Number and Complexity of problems: 6  Differential Diagnosis: dialytic emergency    Conditions and Status:        Condition is unchanged.     [facility] Data  External documents reviewed: previous medical records  My EKG interpretation: sinus, prolonged QT  My plain film interpretation: left lower lobe infiltrate  Tests considered but not ordered: none     Decision  rules/scores evaluated (example [id number], Wells, etc): n/a     Discussed with: patient     Treatment Plan  Urgent hemodialysis  Urgent nephrology consult  Am labs  CT chest without contrast rule out pneumonia    Care Planning  Shared decision making: the patient  Code status and discussions: full code    Disposition  Social Determinants of Health that impact treatment or disposition: none  I expect the patient to be discharged to home in 3 days.      I confirmed that the patient's Advance Care Plan is present, code status is documented, or surrogate decision maker is listed in the patient's medical record.    I discussed the patient's findings and my recommendations with: patient, DR Solano      This document has been electronically signed by Parish Oviedo MD on August 26, 2023 13:41 CDT                  Electronically signed by Parish Oviedo MD at 08/26/23 1400          Physician Progress Notes (last 48 hours)        Raegan Jonas APRN at 08/28/23 1058              [facility]  INPATIENT WOUND & OSTOMY CARE    PROGRESS NOTE    Today's Date: 08/28/23    Patient Name: Yamileth Slater  MRN: [id number]  CSN: [id number]  PCP: Kiersten Wilson APRN  Referring Provider: Parish Oviedo MD  Attending Provider: Parish Oviedo MD  Length of Stay: 2    SUBJECTIVE   Chief Complaint: foot wounds    HPI: Yamileth Slater is a 63 year old female I was consulted on for wound care/assessment. Patient has past medical history of ESKD, CHF, diabetes, CAD, GERD, obesity, hyperlipidemia, and obesity. Patient has wound to right second toe that is necrotic and stable. Patient has incision to right great toe from amputation. This wound is nearly healed also.      Visit Dx:    ICD-10-CM ICD-9-CM   1. Hyperkalemia  E87.5 276.7   2. Patient requiring acute dialysis  Z99.2 V45.11   3. Physical deconditioning  R53.81 799.3       Hospital Problem List:     Hyperkalemia    Noncompliance with  renal dialysis      History:   Past Medical History:   Diagnosis Date    Acute blood loss anemia 04/16/2017    Likely due to gastric oozing at this time. - Dr. Duarte (GI) was consulted and has now signed off, will follow up outpatient - pill colonoscopy showed AVMs - continue to monitor    Acute cystitis with hematuria 03/31/2021 1/13: IV Rocephin 1 gm q 24 1/14 : transitioned  to omnicef 300mg. Urine cultures resulted and did not show growth. Omnicef discontinued as patient is asymptomatic    Altered mental status 01/09/2022    - AMS on presentation - initial ABG pH 7.3, CO2 34 - Procal 0.29 - UA negative for acute cysitits -CTA head wnl  - Empiric Zosyn and Vancomycin -Lactate 2.5 on admission  - blood cultures no growth at 24 hours      Anxiety     CAD (coronary artery disease) 04/24/2021    S/P 3 stents 5/1/2021 for [facility] Continue ASA 81mg & Clopidogrel 75mg Continue Atorvastatin 40mg    Carotid artery stenosis     CHF (congestive heart failure)     Chronic obstructive lung disease     CKD (chronic kidney disease) stage 4, GFR 15-29 ml/min     CKD (chronic kidney disease), symptom management only, stage 5 10/05/2020    Results from last 7 days Lab Units 12/15/21 0548 12/14/21 1323 12/14/21 0916 CREATININE mg/dL 3.92* 3.21* 3.32*  Baseline creatinine 2-3 GFR 13-25 GFR 15 Dialysis MWF, sees Dr. Lauren Nephrology consult,, appreciate recommendations Continue Bumex 1mg bid daily Holding Bumex 2mg 4 times a week    Colonic polyp     Coronary arteriosclerosis     Diabetes mellitus     Diabetic neuropathy     Ear pain, right 10/18/2021    - canal trauma due to patient scratching and DMT2 - added cortisporin ear drops    Elevated troponin 10/12/2021    -most likely from CKD -Trending down -Neg chest pain    Generalized abdominal pain 07/01/2022    Could be due to initiation of tube feeds vs dyspepsia vs abdominal cramps related to no PO intake due to intubation vs constipation Continue current laxative regiment  If no  bowel movement by this afternoon will consider enema    GERD (gastroesophageal reflux disease)     GI bleed 05/13/2021    - GI will follow up outpatient - Protonix 40mg daily - Avoid medical DVT prophy and use mechanical at this time instead. - Continue to monitor - pill colonoscopy results showed AVMs    History of transfusion     Hypercholesterolemia     Hyperosmolar hyperglycemic state (HHS) 06/25/2022    Serum glucose 605 on admission  Anion gap 16 PH 7.37 Bicarb 27.9 Continue fluids  Insulin drip with [facility] protocol  Anion gap closed around 10 AM, received one dose of Levamir subq, will stop insulin drip after 2 hrs      Hypertension     Hypokalemia 05/27/2022    Will replace as needed. Will be cautious in the setting of ESRD to avoid need for emergency dialysis   Monitor Qtc intervals on EKG      Hypomagnesemia 06/27/2021    Monitor and replace    Morbid obesity     Nephrolithiasis     On mechanically assisted ventilation 06/26/2022    Vent management and sedation orders placed.  - [facility] intensivist group consulted for vent management appreciate recommendations  - plan to extubate today      Peripheral vascular disease     Pleural effusion on right 06/26/2022    CXR on 6/30/22 read as a small upper left pulmonary edema vs early pneumonia.  Last Echo 1/2022 EF 61-65 % Continue to monitor  Procal slightly improved, CRP improved On Linezolid and meropenum       PVD (peripheral vascular disease)     SIRS (systemic inflammatory response syndrome) 01/09/2022    Admission  - WBC 17.78   -   - RR 16 - 1/10: VSS/wnl - CXR - Mild pulm edema - Blood cultures no growth at 24 hours  - Procalcitonin 0.29 - UA : glucose 1000, negative Leucocytes/nitrate - Empiric Zosyn and Vancomcyin     Sleep apnea     Substance abuse     Vitamin D deficiency      Past Surgical History:   Procedure Laterality Date    AMPUTATION DIGIT Right 2/27/2023    Procedure: Excision of right first metatarsal head, right second toe  amputation;  Surgeon: Jean Oliveira DPM;  Location: [facility] OR;  Service: Podiatry;  Laterality: Right;    BELOW KNEE AMPUTATION Left 12/16/2022    Procedure: AMPUTATION BELOW KNEE, LEFT;  Surgeon: Huy White MD;  Location: [facility] OR;  Service: Orthopedics;  Laterality: Left;    CARDIAC CATHETERIZATION N/A 07/14/2020    CARDIAC CATHETERIZATION N/A 04/23/2021    Procedure: Left Heart Cath;  Surgeon: Melba Romo MD;  Location: [facility] CATH INVASIVE LOCATION;  Service: Cardiology;  Laterality: N/A;    CARDIAC CATHETERIZATION N/A 04/30/2021    Procedure: Percutaneous Coronary Intervention;  Surgeon: Russell Voss MD;  Location: [facility] CATH INVASIVE LOCATION;  Service: Cardiovascular;  Laterality: N/A;    CARDIAC CATHETERIZATION N/A 04/30/2021    Procedure: Stent NIKKI coronary;  Surgeon: Russell Voss MD;  Location: [facility] CATH INVASIVE LOCATION;  Service: Cardiovascular;  Laterality: N/A;    CARDIAC CATHETERIZATION Left 11/13/2021    Procedure: Left Heart Cath;  Surgeon: Niall Rios MD;  Location: [facility] CATH INVASIVE LOCATION;  Service: Cardiology;  Laterality: Left;    CAROTID STENT Left     COLONOSCOPY      COLONOSCOPY N/A 05/14/2021    Procedure: COLONOSCOPY;  Surgeon: Mingo Duarte MD;  Location: [facility] ENDOSCOPY;  Service: Gastroenterology;  Laterality: N/A;    CORONARY ARTERY BYPASS GRAFT N/A 2013    CABG X 3    CYSTOSCOPY BLADDER STONE LITHOTRIPSY Bilateral     ENDOSCOPY N/A 04/12/2021    Procedure: ESOPHAGOGASTRODUODENOSCOPY;  Surgeon: Mingo Duarte MD;  Location: [facility] ENDOSCOPY;  Service: Gastroenterology;  Laterality: N/A;    ENDOSCOPY N/A 05/14/2021    Procedure: ESOPHAGOGASTRODUODENOSCOPY;  Surgeon: Mingo Duarte MD;  Location: [facility] ENDOSCOPY;  Service: Gastroenterology;  Laterality: N/A;    ENDOSCOPY N/A 01/28/2022    Procedure: ESOPHAGOGASTRODUODENOSCOPY;  Surgeon: Mingo Duarte MD;  Location: [facility] ENDOSCOPY;  Service:  Gastroenterology;  Laterality: N/A;    HYSTERECTOMY      INTERVENTIONAL RADIOLOGY PROCEDURE N/A 10/21/2021    Procedure: tunneled central venous catheter placement;  Surgeon: Donnie Robles MD;  Location: [facility] INVASIVE LOCATION;  Service: Interventional Radiology;  Laterality: N/A;    INTERVENTIONAL RADIOLOGY PROCEDURE N/A 01/27/2022    Procedure: tunneled central venous catheter placement;  Surgeon: Donnie Robles MD;  Location: [facility] INVASIVE LOCATION;  Service: Interventional Radiology;  Laterality: N/A;    LAPAROSCOPIC TUBAL LIGATION      TUNNELED VENOUS CATHETER PLACEMENT       Social History     Socioeconomic History    Marital status: Legally      Spouse name: wesley dumont   Tobacco Use    Smoking status: Some Days     Packs/day: 0.25     Years: 46.00     Pack years: 11.50     Types: Cigarettes     Start date: 2/1/1999    Smokeless tobacco: Never   Vaping Use    Vaping Use: Every day    Substances: Nicotine    Devices: Disposable   Substance and Sexual Activity    Alcohol use: No    Drug use: Not Currently     Types: Marijuana    Sexual activity: Defer       Allergies:  Allergies   Allergen Reactions    Adhesive Tape Hives, Other (See Comments) and Rash    Nsaids Hives    Latex Other (See Comments) and Hives    Other Itching     Bandaids, MRSA, SURECLOSE    Wound Dressing Adhesive Hives and Rash       Medications:    Current Facility-Administered Medications:     atorvastatin (LIPITOR) tablet 40 mg, 40 mg, Oral, Daily, Parish Oviedo MD, 40 mg at 08/28/23 0930    sennosides-docusate (PERICOLACE) 8.6-50 MG per tablet 2 tablet, 2 tablet, Oral, BID **AND** polyethylene glycol (MIRALAX) packet 17 g, 17 g, Oral, Daily PRN **AND** bisacodyl (DULCOLAX) EC tablet 5 mg, 5 mg, Oral, Daily PRN **AND** bisacodyl (DULCOLAX) suppository 10 mg, 10 mg, Rectal, Daily PRN, Parish Oviedo MD    Calcium Replacement - Follow Nurse / BPA Driven Protocol, , Does not apply, PRN, Ivelisse  MD Parish    carvedilol (COREG) tablet 12.5 mg, 12.5 mg, Oral, BID With Meals, Parish Oviedo MD, 12.5 mg at 08/28/23 0930    cetirizine (zyrTEC) tablet 10 mg, 10 mg, Oral, Nightly, Parish Oviedo MD, 10 mg at 08/27/23 2101    clopidogrel (PLAVIX) tablet 75 mg, 75 mg, Oral, Daily, Parish Oviedo MD, 75 mg at 08/28/23 0931    cyclobenzaprine (FLEXERIL) tablet 5 mg, 5 mg, Oral, BID, Parish Oviedo MD, 5 mg at 08/28/23 0931    dextrose (D50W) (25 g/50 mL) IV injection 25 g, 25 g, Intravenous, Q15 Min PRN, Nestor Richards MD    dextrose (D50W) (25 g/50 mL) IV injection 50 mL, 50 mL, Intravenous, Q1H PRN, Parish Oviedo MD, 50 mL at 08/26/23 1301    dextrose (GLUTOSE) oral gel 15 g, 15 g, Oral, Q15 Min PRN, Nestor Richards MD    docusate sodium (COLACE) capsule 100 mg, 100 mg, Oral, BID, Parish Oviedo MD    DULoxetine (CYMBALTA) DR capsule 20 mg, 20 mg, Oral, Daily, Parish Oviedo MD, 20 mg at 08/28/23 0930    furosemide (LASIX) tablet 20 mg, 20 mg, Oral, Daily, Parish Oviedo MD, 20 mg at 08/28/23 0930    gabapentin (NEURONTIN) capsule 300 mg, 300 mg, Oral, Daily, Parish Oviedo MD, 300 mg at 08/28/23 0937    glucagon HCl (Diagnostic) injection 1 mg, 1 mg, Intramuscular, Q15 Min PRN, Nestor Richards MD    heparin (porcine) 5000 UNIT/ML injection 5,000 Units, 5,000 Units, Subcutaneous, Q12H, Parish Oviedo MD, 5,000 Units at 08/27/23 2101    heparin (porcine) injection 2,000 Units, 2,000 Units, Intracatheter, PRN, Tee Jacobo MD    Insulin Aspart (novoLOG) injection 0-9 Units, 0-9 Units, Subcutaneous, 4x Daily AC & at Bedtime PRN, Nestor Richards MD, 4 Units at 08/27/23 1221    insulin detemir (LEVEMIR) injection 24 Units, 24 Units, Subcutaneous, Q12H, Parish Oviedo MD, 24 Units at 08/28/23 0936    ipratropium-albuterol (DUO-NEB) nebulizer solution 3 mL, 3 mL, Nebulization, 4x Daily PRN, Parish Oviedo MD    Magnesium Standard Dose Replacement - Follow Nurse / BPA Driven Protocol, , Does not apply, PRN,  Parish Oviedo MD    memantine (NAMENDA) tablet 5 mg, 5 mg, Oral, BID, Parish Oviedo MD, 5 mg at 08/28/23 0937    nitroglycerin (NITROSTAT) SL tablet 0.4 mg, 0.4 mg, Sublingual, Q5 Min PRN, Parish Oviedo MD    ondansetron (ZOFRAN) tablet 4 mg, 4 mg, Oral, Q6H PRN **OR** ondansetron (ZOFRAN) injection 4 mg, 4 mg, Intravenous, Q6H PRN, Parish Oviedo MD    oxyCODONE (ROXICODONE) immediate release tablet 10 mg, 10 mg, Oral, TID, Parish Oviedo MD, 10 mg at 08/28/23 0933    pantoprazole (PROTONIX) EC tablet 40 mg, 40 mg, Oral, Nightly, Parish Oviedo MD, 40 mg at 08/27/23 2101    Phosphorus Replacement - Follow Nurse / BPA Driven Protocol, , Does not apply, PRN, Parish Oviedo MD    Potassium Replacement - Follow Nurse / BPA Driven Protocol, , Does not apply, PRN, Parish Oviedo MD    promethazine (PHENERGAN) tablet 25 mg, 25 mg, Oral, Q6H PRN, Parish Oviedo MD    sevelamer (RENVELA) tablet 800 mg, 800 mg, Oral, TID With Meals, Parish Oviedo MD, 800 mg at 08/28/23 0931    sodium chloride 0.9 % flush 10 mL, 10 mL, Intravenous, Q12H, Parish Oviedo MD, 10 mL at 08/28/23 0930    sodium chloride 0.9 % flush 10 mL, 10 mL, Intravenous, PRN, Parish Oviedo MD    sodium chloride 0.9 % infusion 40 mL, 40 mL, Intravenous, PRN, Parish Oviedo MD    sodium zirconium cyclosilicate (LOKELMA) pack 10 g, 10 g, Oral, TID, Parish Oviedo MD, 10 g at 08/27/23 2054    Review of Systems:  Review of Systems      OBJECTIVE     Vitals:    08/28/23 1036   BP: 168/79   Pulse: 79   Resp: 18   Temp: 97.8 °F (36.6 °C)   SpO2: 94%       PHYSICAL EXAM.  Physical Exam  Physical Exam  Vitals reviewed. Nursing notes reviewed.  Constitutional:       Appearance: Well-developed.   Musculoskeletal:         Cervical back: Normal range of motion and neck supple.   Skin:     General: Skin is warm and dry.      Coloration: Skin is not pale.      Findings: No erythema or rash. Right great toe amp incision wound,   Neurological:      Mental  Status: Pt is alert and oriented to person, place, and time.   Psychiatric:         Behavior: Behavior normal.         Thought Content: Thought content normal.         Judgment: Judgment normal.       Results Review:  Lab Results (last 48 hours)       Procedure Component Value Units Date/Time    POC Glucose Once [[id number]]  (Normal) Collected: 08/28/23 0702    Specimen: Blood Updated: 08/28/23 0731     Glucose 116 mg/dL      Comment: RN NotifiedOperator: [id number] MINERVA CHENMeter ID: [id number]       Phosphorus [[id number]]  (Abnormal) Collected: 08/28/23 0605    Specimen: Blood Updated: 08/28/23 0719     Phosphorus 8.3 mg/dL     Magnesium [[id number]]  (Normal) Collected: 08/28/23 0605    Specimen: Blood Updated: 08/28/23 0713     Magnesium 2.1 mg/dL     Basic Metabolic Panel [[id number]]  (Abnormal) Collected: 08/28/23 0605    Specimen: Blood Updated: 08/28/23 0713     Glucose 123 mg/dL      BUN 52 mg/dL      Creatinine 6.31 mg/dL      Sodium 135 mmol/L      Potassium 4.7 mmol/L      Chloride 98 mmol/L      CO2 22.0 mmol/L      Calcium 9.2 mg/dL      BUN/Creatinine Ratio 8.2     Anion Gap 15.0 mmol/L      eGFR 6.9 mL/min/1.73      Comment: <15 Indicative of kidney failure       Narrative:      GFR Normal >60  Chronic Kidney Disease <60  Kidney Failure <15      CBC & Differential [[id number]]  (Abnormal) Collected: 08/28/23 0605    Specimen: Blood Updated: 08/28/23 0651    Narrative:      The following orders were created for panel order CBC & Differential.  Procedure                               Abnormality         Status                     ---------                               -----------         ------                     CBC Auto Differential[[id number]]        Abnormal            Final result                 Please view results for these tests on the individual orders.    CBC Auto Differential [[id number]]  (Abnormal) Collected: 08/28/23 0605    Specimen: Blood Updated: 08/28/23 0651     WBC 5.09  10*3/mm3      RBC 2.92 10*6/mm3      Hemoglobin 8.8 g/dL      Hematocrit 27.0 %      MCV 92.5 fL      MCH 30.1 pg      MCHC 32.6 g/dL      RDW 15.2 %      RDW-SD 50.9 fl      MPV 8.9 fL      Platelets 163 10*3/mm3      Neutrophil % 39.7 %      Lymphocyte % 43.8 %      Monocyte % 10.2 %      Eosinophil % 4.5 %      Basophil % 1.6 %      Immature Grans % 0.2 %      Neutrophils, Absolute 2.02 10*3/mm3      Lymphocytes, Absolute 2.23 10*3/mm3      Monocytes, Absolute 0.52 10*3/mm3      Eosinophils, Absolute 0.23 10*3/mm3      Basophils, Absolute 0.08 10*3/mm3      Immature Grans, Absolute 0.01 10*3/mm3      nRBC 0.0 /100 WBC     POC Glucose Once [[id number]]  (Abnormal) Collected: 08/27/23 1938    Specimen: Blood Updated: 08/27/23 2012     Glucose 171 mg/dL      Comment: RN NotifiedOperator: [id number] [facility] ID: [id number]       Extra Tubes [[id number]] Collected: 08/27/23 1542    Specimen: Blood, Venous Line Updated: 08/27/23 1645    Narrative:      The following orders were created for panel order Extra Tubes.  Procedure                               Abnormality         Status                     ---------                               -----------         ------                     Lavender Top[[id number]]                                     Final result                 Please view results for these tests on the individual orders.    Lavender Top [[id number]] Collected: 08/27/23 1542    Specimen: Blood Updated: 08/27/23 1645     Extra Tube hold for add-on     Comment: Auto resulted       POC Glucose Once [[id number]]  (Abnormal) Collected: 08/27/23 1624    Specimen: Blood Updated: 08/27/23 1645     Glucose 151 mg/dL      Comment: RN NotifiedOperator: [id number] [facility] ID: [id number]       Potassium [[id number]]  (Abnormal) Collected: 08/27/23 1537    Specimen: Blood Updated: 08/27/23 1555     Potassium 5.9 mmol/L     POC Glucose Once [[id number]]  (Abnormal) Collected: 08/27/23 1113    Specimen:  Blood Updated: 08/27/23 1139     Glucose 218 mg/dL      Comment: RN NotifiedOperator: [id number] LAFFOON TROYMeter ID: [id number]       POC Glucose Once [[id number]]  (Abnormal) Collected: 08/27/23 0741    Specimen: Blood Updated: 08/27/23 0831     Glucose 138 mg/dL      Comment: RN NotifiedOperator: [id number] LAFFOON TROYMeter ID: [id number]       Magnesium [[id number]]  (Normal) Collected: 08/27/23 0254    Specimen: Blood Updated: 08/27/23 0336     Magnesium 1.8 mg/dL     Basic Metabolic Panel [[id number]]  (Abnormal) Collected: 08/27/23 0254    Specimen: Blood Updated: 08/27/23 0328     Glucose 154 mg/dL      BUN 44 mg/dL      Creatinine 5.16 mg/dL      Sodium 134 mmol/L      Potassium 5.6 mmol/L      Chloride 98 mmol/L      CO2 25.0 mmol/L      Calcium 9.0 mg/dL      BUN/Creatinine Ratio 8.5     Anion Gap 11.0 mmol/L      eGFR 8.8 mL/min/1.73      Comment: <15 Indicative of kidney failure       Narrative:      GFR Normal >60  Chronic Kidney Disease <60  Kidney Failure <15      Phosphorus [[id number]]  (Abnormal) Collected: 08/27/23 0254    Specimen: Blood Updated: 08/27/23 0328     Phosphorus 5.6 mg/dL     Hemoglobin A1c [[id number]]  (Normal) Collected: 08/27/23 0254    Specimen: Blood Updated: 08/27/23 0320     Hemoglobin A1C 5.40 %     Narrative:      Hemoglobin A1C Ranges:    Increased Risk for Diabetes  5.7% to 6.4%  Diabetes                     >= 6.5%  Diabetic Goal                < 7.0%    CBC & Differential [[id number]]  (Abnormal) Collected: 08/27/23 0254    Specimen: Blood Updated: 08/27/23 0311    Narrative:      The following orders were created for panel order CBC & Differential.  Procedure                               Abnormality         Status                     ---------                               -----------         ------                     CBC Auto Differential[[id number]]        Abnormal            Final result                 Please view results for these tests on the individual orders.     CBC Auto Differential [[id number]]  (Abnormal) Collected: 08/27/23 0254    Specimen: Blood Updated: 08/27/23 0311     WBC 8.84 10*3/mm3      RBC 3.07 10*6/mm3      Hemoglobin 9.0 g/dL      Hematocrit 28.9 %      MCV 94.1 fL      MCH 29.3 pg      MCHC 31.1 g/dL      RDW 15.7 %      RDW-SD 54.1 fl      MPV 8.6 fL      Platelets 171 10*3/mm3      Neutrophil % 80.1 %      Lymphocyte % 11.0 %      Monocyte % 5.9 %      Eosinophil % 1.6 %      Basophil % 0.8 %      Immature Grans % 0.6 %      Neutrophils, Absolute 7.09 10*3/mm3      Lymphocytes, Absolute 0.97 10*3/mm3      Monocytes, Absolute 0.52 10*3/mm3      Eosinophils, Absolute 0.14 10*3/mm3      Basophils, Absolute 0.07 10*3/mm3      Immature Grans, Absolute 0.05 10*3/mm3      nRBC 0.0 /100 WBC     POC Glucose Once [[id number]]  (Abnormal) Collected: 08/26/23 2058    Specimen: Blood Updated: 08/26/23 2112     Glucose 176 mg/dL      Comment: RN NotifiedOperator: [id number] LUCILA SYKES ANNMeter ID: [id number]       Comprehensive Metabolic Panel [[id number]]  (Abnormal) Collected: 08/26/23 1853    Specimen: Blood Updated: 08/26/23 1936     Glucose 135 mg/dL      BUN 42 mg/dL      Creatinine 4.44 mg/dL      Sodium 133 mmol/L      Potassium 5.1 mmol/L      Chloride 97 mmol/L      CO2 21.0 mmol/L      Calcium 9.1 mg/dL      Total Protein 8.3 g/dL      Albumin 3.5 g/dL      ALT (SGPT) 10 U/L      AST (SGOT) 20 U/L      Alkaline Phosphatase 220 U/L      Total Bilirubin 0.5 mg/dL      Globulin 4.8 gm/dL      A/G Ratio 0.7 g/dL      BUN/Creatinine Ratio 9.5     Anion Gap 15.0 mmol/L      eGFR 10.5 mL/min/1.73      Comment: <15 Indicative of kidney failure       Narrative:      GFR Normal >60  Chronic Kidney Disease <60  Kidney Failure <15      Comprehensive Metabolic Panel [[id number]]  (Abnormal) Collected: 08/26/23 1152    Specimen: Blood Updated: 08/26/23 1243     Glucose 109 mg/dL      BUN 73 mg/dL      Creatinine 6.75 mg/dL      Sodium 133 mmol/L      Potassium 8.8  mmol/L      Comment: Confirmed by repeat analysis          Chloride 101 mmol/L      CO2 18.0 mmol/L      Calcium 9.7 mg/dL      Total Protein 8.4 g/dL      Albumin 3.7 g/dL      ALT (SGPT) 9 U/L      AST (SGOT) 14 U/L      Alkaline Phosphatase 212 U/L      Total Bilirubin 0.4 mg/dL      Globulin 4.7 gm/dL      A/G Ratio 0.8 g/dL      BUN/Creatinine Ratio 10.8     Anion Gap 14.0 mmol/L      eGFR 6.4 mL/min/1.73      Comment: <15 Indicative of kidney failure       Narrative:      GFR Normal >60  Chronic Kidney Disease <60  Kidney Failure <15      Magnesium [[id number]]  (Abnormal) Collected: 08/26/23 1041    Specimen: Blood Updated: 08/26/23 1138     Magnesium 2.6 mg/dL     BNP [[id number]]  (Abnormal) Collected: 08/26/23 1041    Specimen: Blood Updated: 08/26/23 1130     proBNP 3,471.0 pg/mL     Narrative:      Among patients with dyspnea, NT-proBNP is highly sensitive for the detection of acute congestive heart failure. In addition NT-proBNP of <300 pg/ml effectively rules out acute congestive heart failure with 99% negative predictive value.      CBC & Differential [[id number]]  (Abnormal) Collected: 08/26/23 1041    Specimen: Blood Updated: 08/26/23 1102    Narrative:      The following orders were created for panel order CBC & Differential.  Procedure                               Abnormality         Status                     ---------                               -----------         ------                     CBC Auto Differential[[id number]]        Abnormal            Final result                 Please view results for these tests on the individual orders.    CBC Auto Differential [[id number]]  (Abnormal) Collected: 08/26/23 1041    Specimen: Blood Updated: 08/26/23 1102     WBC 9.36 10*3/mm3      RBC 3.24 10*6/mm3      Hemoglobin 9.8 g/dL      Hematocrit 30.2 %      MCV 93.2 fL      MCH 30.2 pg      MCHC 32.5 g/dL      RDW 15.9 %      RDW-SD 54.4 fl      MPV 8.8 fL      Platelets 164 10*3/mm3      Neutrophil %  65.2 %      Lymphocyte % 26.2 %      Monocyte % 5.2 %      Eosinophil % 1.9 %      Basophil % 1.1 %      Immature Grans % 0.4 %      Neutrophils, Absolute 6.10 10*3/mm3      Lymphocytes, Absolute 2.45 10*3/mm3      Monocytes, Absolute 0.49 10*3/mm3      Eosinophils, Absolute 0.18 10*3/mm3      Basophils, Absolute 0.10 10*3/mm3      Immature Grans, Absolute 0.04 10*3/mm3      nRBC 0.0 /100 WBC           Imaging Results (Last 72 Hours)       Procedure Component Value Units Date/Time    CT Chest Without Contrast Diagnostic [[id number]] Collected: 08/26/23 2050     Updated: 08/26/23 2207    Narrative:        HISTORY:  Pneumonia suspected, uncomplicated, no prior imaging (Ped >= 3mo)    COMPARISON:  Same-day x-ray    TECHNIQUE:  Tomographic images of the chest were obtained without the administration of  intravenous contrast. Multiplanar reformatted images were provided for review.    FINDINGS:  Postprocedural changes of prior sternotomy. Grossly unremarkable appearance of  the soft tissues. Grossly unremarkable appearance of the partially visualized  upper abdomen. Scattered atheromatous calcifications of the great vessels and  coronary arteries. Scattered mosaic-like attenuation of the lungs as can be seen  with small airways disease. There is left greater than right dependent platelike  atelectasis.    IMPRESSIONS:  1. Left greater than right platelike atelectasis corresponding to findings on  recent prior x-ray.    2. Scattered diffuse mosaic-like attenuation of the lungs as can be seen with  small airways disease.    XR Chest 1 View [[id number]] Collected: 08/26/23 1033     Updated: 08/26/23 1038    Narrative:      Comparison:  5/11/2023    FINDINGS:  Stable cardiomegaly.  Minimal patchy left suprahilar airspace opacity may  represent atelectasis or pneumonia in the appropriate setting.  No pleural  effusion or pneumothorax.                 ASSESSMENT/PLAN       Examination and evaluation of wound(s) was  "performed.    DIAGNOSIS:     Diabetes mellitus with foot ulcer    PLAN:     -Paint toe with betadine.  -Dressing placed to right great toe amp site.  -Follow up Wednesday in wound center.    Discussed findings and treatment plan including risks, benefits, and treatment options with patient in detail. Patient agreed with treatment plan.          This document has been electronically signed by BRIDGETT Corbett on 2023 11:02 CDT           Electronically signed by Raegan Jonas APRN at 23 1102       Dayna Benjamin APRN at 23 1357       Attestation signed by Tee Jacobo MD at 23 1900    I have reviewed this documentation and agree.    I have reviewed the case with BRIDGETT Long. I have also examined and seen the patient.  I agree with the assessment and plan as documented in the note.    Plan for hd in the am. K high started on lokelma tid      This document has been electronically signed by Tee Jacobo MD on 2023 19:00 CDT                          [facility]  [address] [place] [address]. [id number]  T - [phone]  F - [phone]     Progress Note          PATIENT  DEMOGRAPHICS   PATIENT NAME: Yamileth Slater                      PHYSICIAN: BRIDGETT Sapp  : 1960  MRN: [id number]   LOS: 1 day    Patient Care Team:  Kiersten Wilson APRN as PCP - General (Family Medicine)  Subjective   SUBJECTIVE   No acute events overnight          Objective   OBJECTIVE   Vital Signs  Temp:  [97.3 °F (36.3 °C)-98.2 °F (36.8 °C)] 98.2 °F (36.8 °C)  Heart Rate:  [59-87] 70  Resp:  [16-20] 16  BP: (130-168)/(60-85) 135/60    Flowsheet Rows      Flowsheet Row First Filed Value   Admission Height 160 cm (62.99\") Documented at 2023 0957   Admission Weight 113 kg (250 lb) Documented at 2023 0957             I/O last 3 completed shifts:  In: -   Out:      PHYSICAL EXAM    Physical Exam  Vitals reviewed. "   Constitutional:       Appearance: She is well-developed.   HENT:      Head: Normocephalic and atraumatic.   Eyes:      Conjunctiva/sclera: Conjunctivae normal.      Pupils: Pupils are equal, round, and reactive to light.   Cardiovascular:      Rate and Rhythm: Normal rate and regular rhythm.   Pulmonary:      Effort: Pulmonary effort is normal.      Breath sounds: Normal breath sounds.   Abdominal:      Palpations: Abdomen is soft.   Musculoskeletal:      Cervical back: Neck supple.      Right lower leg: No edema.      Left lower leg: No edema.   Skin:     General: Skin is warm and dry.   Neurological:      Mental Status: She is alert and oriented to person, place, and time.   Psychiatric:         Mood and Affect: Mood normal.         Behavior: Behavior normal.       RESULTS   Results Review:    Results from last 7 days   Lab Units 08/27/23  0254 08/26/23 1853 08/26/23  1152   SODIUM mmol/L 134* 133* 133*   POTASSIUM mmol/L 5.6* 5.1 8.8*   CHLORIDE mmol/L 98 97* 101   CO2 mmol/L 25.0 21.0* 18.0*   BUN mg/dL 44* 42* 73*   CREATININE mg/dL 5.16* 4.44* 6.75*   CALCIUM mg/dL 9.0 9.1 9.7   BILIRUBIN mg/dL  --  0.5 0.4   ALK PHOS U/L  --  220* 212*   ALT (SGPT) U/L  --  10 9   AST (SGOT) U/L  --  20 14   GLUCOSE mg/dL 154* 135* 109*       Estimated Creatinine Clearance: 13 mL/min (A) (by C-G formula based on SCr of 5.16 mg/dL (H)).    Results from last 7 days   Lab Units 08/27/23  0254 08/26/23  1041   MAGNESIUM mg/dL 1.8 2.6*   PHOSPHORUS mg/dL 5.6*  --              Results from last 7 days   Lab Units 08/27/23  0254 08/26/23  1041   WBC 10*3/mm3 8.84 9.36   HEMOGLOBIN g/dL 9.0* 9.8*   PLATELETS 10*3/mm3 171 164               Imaging Results (Last 24 Hours)       Procedure Component Value Units Date/Time    CT Chest Without Contrast Diagnostic [[id number]] Collected: 08/26/23 2050     Updated: 08/26/23 2207    Narrative:        HISTORY:  Pneumonia suspected, uncomplicated, no prior imaging (Ped >=  3mo)    COMPARISON:  Same-day x-ray    TECHNIQUE:  Tomographic images of the chest were obtained without the administration of  intravenous contrast. Multiplanar reformatted images were provided for review.    FINDINGS:  Postprocedural changes of prior sternotomy. Grossly unremarkable appearance of  the soft tissues. Grossly unremarkable appearance of the partially visualized  upper abdomen. Scattered atheromatous calcifications of the great vessels and  coronary arteries. Scattered mosaic-like attenuation of the lungs as can be seen  with small airways disease. There is left greater than right dependent platelike  atelectasis.    IMPRESSIONS:  1. Left greater than right platelike atelectasis corresponding to findings on  recent prior x-ray.    2. Scattered diffuse mosaic-like attenuation of the lungs as can be seen with  small airways disease.             MEDICATIONS    atorvastatin, 40 mg, Oral, Daily  carvedilol, 12.5 mg, Oral, BID With Meals  cetirizine, 10 mg, Oral, Nightly  clopidogrel, 75 mg, Oral, Daily  cyclobenzaprine, 5 mg, Oral, BID  docusate sodium, 100 mg, Oral, BID  DULoxetine, 20 mg, Oral, Daily  furosemide, 20 mg, Oral, Daily  gabapentin, 300 mg, Oral, Daily  heparin (porcine), 5,000 Units, Subcutaneous, Q12H  insulin detemir, 24 Units, Subcutaneous, Q12H  memantine, 5 mg, Oral, BID  oxyCODONE, 10 mg, Oral, TID  pantoprazole, 40 mg, Oral, Nightly  senna-docusate sodium, 2 tablet, Oral, BID  sevelamer, 800 mg, Oral, TID With Meals  sodium chloride, 10 mL, Intravenous, Q12H  sodium zirconium cyclosilicate, 10 g, Oral, TID           Assessment & Plan   ASSESSMENT / PLAN      Hyperkalemia    Noncompliance with renal dialysis    ESRD on HD: TTS at [facility]. missed HD tx on Thursday and saturday. Emergent Hd yesterday.    2.   Hyperkalemia : potassium down to 5.6 from 8.8- agree with lokelma 10g TID.  Initially had widen QRS and bradycardia, recieved kayexalate, calcium gluconate, insulin, D50 for  potassium shift.      3.    Metabolic acidosis- will manage with HD     4.   DM2     5.   CAD     6.    CKD-MBD     7.    Hypertension     8.   Hyponatremia: mild, will monitor           This document has been electronically signed by BRIDGETT Sapp on August 27, 2023 13:58 CDT            Electronically signed by Tee Jacobo MD at 08/27/23 1900       Parish Oviedo MD at 08/27/23 1048              [facility]  INPATIENT PROGRESS NOTE    Length of Stay: 1  Date of Admission: 8/26/2023  Primary Care Physician: Kiersten Wilson APRN    Subjective   Chief Complaint: severe hyperkalemia  HPI:  63-year-old female presented to the ER after missing HD on Thursday and today. During evaluation it was noted that potassium is 8.8. Alert, oriented, no SOB, no chest pain, no cold symptoms. Relevant past medical history: HTN(stable, increased risk stroke, rupture), Hyperlipidemia(stable, increased risk cardiovascular events), Diabetes Mellitus(stable, increased risk cardiovascular events), Obesity(uncontrolled, increased risk cardiovascular events) and Chronic Kidney Disease(stable, increased risk renal failure), COPD with chronic, O2 use over the last 3 years, coronary artery disease multiple interventions, peripheral vascular disease multiple distal intervention, poorly controlled diabetes with diabetic foot infection. history of MRSA CRE, on dialysis, poor follow-up.  former smoker.  Quit 1999.   Patient is medically complex, multiple hospitalizations over the past few years.  Multiple hospital admissions related to diabetic foot infections was transferred to [facility] in [facility] for infectious disease evaluation.  She subsequently underwent vascular evaluation and bilateral lower extremity arteriogram with intervention to both distal SFA and popliteal, she also went left transmetatarsal amputation at that time.  Surgical site  dehiscence of left transmit site has had prolonged wound care with poor healing eventually required L BK which has healed performed in 12/2022.    2013 CABG x3  2014 attempted left carotid endarterectomy, procedure aborted unable to find carotid (Dr. Aguayo)  5/2014 L Transfemoral stent (Dr. Robles)  2014 arteriogram: PTA right SFA stent (Dr. Robles)  11/2021 LHC: Distal left main ostial circumflex stenosis, patent LIMA to LAD, 100% occluded RCA with occluded SVG to RCA  11/2021 PTA stent distal left main [facility]     1/2022 tunnel cath placement     9/15/2022 bilateral lower extremity arteriogram: PTA proximal SFA, PTA and stent stenosis right SFA, PTA/stent right popliteal.  Left SFA PTA stent Olevia 6 x 40 mm 6 x 120 mm due to dissection, in stent stenosis post intervention [facility]  9/19/2022 left transmetatarsal amputation [facility]  12/2022 LEFT BKA Dr. White    As of today, 08/27/23  Review of Systems   Constitutional:  Negative for activity change, appetite change, chills, diaphoresis and fatigue.   HENT:  Negative for congestion, ear discharge, hearing loss, rhinorrhea, sinus pain, sneezing and sore throat.    Eyes:  Negative for photophobia, discharge and visual disturbance.   Respiratory:  Negative for cough, chest tightness, shortness of breath and wheezing.    Cardiovascular:  Negative for chest pain and palpitations.   Gastrointestinal:  Negative for abdominal distention, abdominal pain, blood in stool, diarrhea, nausea and vomiting.   Endocrine: Negative for cold intolerance, heat intolerance, polydipsia, polyphagia and polyuria.   Genitourinary:  Negative for dysuria, flank pain, hematuria and urgency.   Musculoskeletal:  Negative for arthralgias, joint swelling and myalgias.   Skin:  Negative for color change.   Allergic/Immunologic: Negative for food allergies.   Neurological:  Negative for dizziness, seizures, syncope, speech difficulty, weakness and headaches.    Hematological:  Negative for adenopathy. Does not bruise/bleed easily.   Psychiatric/Behavioral:  Negative for confusion, hallucinations, self-injury and suicidal ideas. The patient is not nervous/anxious.       All pertinent negatives and positives are as above. All other systems have been reviewed and are negative unless otherwise stated.    Objective    Temp:  [97.3 °F (36.3 °C)-97.7 °F (36.5 °C)] 97.3 °F (36.3 °C)  Heart Rate:  [50-87] 79  Resp:  [16-20] 18  BP: (130-177)/(62-87) 138/62    AM-PAC 6 Clicks Score (PT): 16 (08/26/23 2052)  Patient Vitals for the past 24 hrs:   BP Temp Temp src Pulse Resp SpO2 Weight   08/27/23 0752 -- -- -- 79 -- -- --   08/27/23 0306 138/62 97.3 °F (36.3 °C) Temporal 79 18 96 % 109 kg (240 lb 1.6 oz)   08/27/23 0139 -- -- -- 87 -- -- --   08/26/23 2052 168/70 97.5 °F (36.4 °C) Temporal 86 18 100 % --   08/26/23 1830 140/79 97.3 °F (36.3 °C) Tympanic 82 16 -- --   08/26/23 1810 152/74 -- -- 78 18 -- --   08/26/23 1800 144/68 -- -- 72 18 -- --   08/26/23 1730 130/85 -- -- 75 18 -- --   08/26/23 1713 -- -- -- 71 -- -- --   08/26/23 1700 159/70 -- -- 69 18 -- --   08/26/23 1630 158/85 -- -- 70 19 -- --   08/26/23 1600 153/73 -- -- 67 18 -- --   08/26/23 1530 161/68 -- -- 65 18 -- --   08/26/23 1515 135/63 -- -- 68 18 -- --   08/26/23 1500 157/65 -- -- 64 20 -- --   08/26/23 1440 132/62 -- -- 60 20 -- --   08/26/23 1409 -- -- -- 59 -- -- --   08/26/23 1359 147/65 97.7 °F (36.5 °C) Temporal -- 18 98 % --   08/26/23 1306 173/77 -- -- 57 19 97 % --   08/26/23 1306 -- -- -- 52 18 -- --   08/26/23 1256 173/77 -- -- 60 -- -- --   08/26/23 1245 161/87 -- -- 50 -- -- --   08/26/23 1145 169/73 -- -- 60 18 98 % --   08/26/23 1130 -- -- -- 61 19 -- --   08/26/23 1115 177/72 -- -- 62 -- 98 % --      As of today, 08/27/23  Physical Exam  Constitutional:       Appearance: Normal appearance.      Comments: Morbidly obese   HENT:      Head: Normocephalic and atraumatic.      Right Ear: Tympanic  membrane normal.      Left Ear: Tympanic membrane normal.      Nose: Nose normal.      Mouth/Throat:      Mouth: Mucous membranes are moist.   Eyes:      Pupils: Pupils are equal, round, and reactive to light.   Cardiovascular:      Rate and Rhythm: Normal rate and regular rhythm.      Pulses: Normal pulses.      Heart sounds: Normal heart sounds.   Pulmonary:      Effort: Pulmonary effort is normal.      Breath sounds: Normal breath sounds.   Abdominal:      General: Abdomen is flat. Bowel sounds are normal.      Palpations: Abdomen is soft.   Musculoskeletal:         General: Normal range of motion.      Cervical back: Normal range of motion and neck supple.      Comments: Transmetatarsal amputation right foot and BKA left leg  Right upper arm AV fistula      Skin:     General: Skin is warm and dry.      Capillary Refill: Capillary refill takes less than 2 seconds.   Neurological:      General: No focal deficit present.      Mental Status: She is alert and oriented to person, place, and time.   Psychiatric:         Mood and Affect: Mood normal.         Behavior: Behavior normal.         Thought Content: Thought content normal.         Judgment: Judgment normal.         Results Review:  I have reviewed the labs, radiology results, and diagnostic studies.    Laboratory Data:   Results from last 7 days   Lab Units 08/27/23  0254 08/26/23  1853 08/26/23  1152   SODIUM mmol/L 134* 133* 133*   POTASSIUM mmol/L 5.6* 5.1 8.8*   CHLORIDE mmol/L 98 97* 101   CO2 mmol/L 25.0 21.0* 18.0*   BUN mg/dL 44* 42* 73*   CREATININE mg/dL 5.16* 4.44* 6.75*   GLUCOSE mg/dL 154* 135* 109*   CALCIUM mg/dL 9.0 9.1 9.7   BILIRUBIN mg/dL  --  0.5 0.4   ALK PHOS U/L  --  220* 212*   ALT (SGPT) U/L  --  10 9   AST (SGOT) U/L  --  20 14   ANION GAP mmol/L 11.0 15.0 14.0     Estimated Creatinine Clearance: 13 mL/min (A) (by C-G formula based on SCr of 5.16 mg/dL (H)).  Results from last 7 days   Lab Units 08/27/23  0254 08/26/23  1041    MAGNESIUM mg/dL 1.8 2.6*   PHOSPHORUS mg/dL 5.6*  --          Results from last 7 days   Lab Units 08/27/23  0254 08/26/23  1041   WBC 10*3/mm3 8.84 9.36   HEMOGLOBIN g/dL 9.0* 9.8*   HEMATOCRIT % 28.9* 30.2*   PLATELETS 10*3/mm3 171 164           Culture Data:   No results found for: BLOODCX  No results found for: URINECX  No results found for: RESPCX  No results found for: WOUNDCX  No results found for: STOOLCX  No components found for: BODYFLD    Radiology Data:   Imaging Results (Last 24 Hours)       Procedure Component Value Units Date/Time    CT Chest Without Contrast Diagnostic [[id number]] Collected: 08/26/23 2050     Updated: 08/26/23 2207    Narrative:        HISTORY:  Pneumonia suspected, uncomplicated, no prior imaging (Ped >= 3mo)    COMPARISON:  Same-day x-ray    TECHNIQUE:  Tomographic images of the chest were obtained without the administration of  intravenous contrast. Multiplanar reformatted images were provided for review.    FINDINGS:  Postprocedural changes of prior sternotomy. Grossly unremarkable appearance of  the soft tissues. Grossly unremarkable appearance of the partially visualized  upper abdomen. Scattered atheromatous calcifications of the great vessels and  coronary arteries. Scattered mosaic-like attenuation of the lungs as can be seen  with small airways disease. There is left greater than right dependent platelike  atelectasis.    IMPRESSIONS:  1. Left greater than right platelike atelectasis corresponding to findings on  recent prior x-ray.    2. Scattered diffuse mosaic-like attenuation of the lungs as can be seen with  small airways disease.            I have utilized all available immediate resources to obtain, update, or review the patient's current medications (including all prescriptions, over-the-counter products, herbals, cannabis/cannabidiol products, and vitamin/mineral/dietary (nutritional) supplements).       Assessment/Plan     Active Hospital Problems    Diagnosis      **Hyperkalemia        Assessment and Plan:     63 years old female patient with diabetes, vascular complications, transmetatarsal amputation right foot and BKA left leg, missed hemodialysis on Thursday and today, came with severe hyperkalemia 8.8, dialytic emergency, nephrologist on board. Potassium went down to 5.1 and now is 5.6, re start Lokelma and DC ARB. CT chest showed atelectasis  Problems:  Dialytic emergency, hyperkalemia, pulmonary congestion  PVD  Mixed hyperlipidemia  Carotid stenosis  Type 2 diabetes  ESRD on  dialysis  Right foot wound    Medical Decision Making  Number and Complexity of problems: 4  Differential Diagnosis: dialytic emergency    Conditions and Status:        Condition is improving.     MDM Data  External documents reviewed: previous medical records  My EKG interpretation: sinus, prolonged QT  My CT interpretation: chest  Tests considered but not ordered: none     Decision rules/scores evaluated (example [id number], [place], etc): n/a     Discussed with: Dr Jacobo, nursing staff and patient     Treatment Plan  Lokelma  Potassium level at 4 pm  DC cozaar  Wound consult  New EKG  Appreciate nephrologist consult  Incentive spirometry  Am labs  Chest PT    Care Planning  Shared decision making: patient  Code status and discussions: full code    Disposition  Social Determinants of Health that impact treatment or disposition: none  I expect the patient to be discharged to home in 2 days.       I confirmed that the patient's Advance Care Plan is present, code status is documented, or surrogate decision maker is listed in the patient's medical record.      This document has been electronically signed by Parish Oviedo MD on August 27, 2023 10:48 CDT      Electronically signed by Parish Oviedo MD at 08/27/23 2219       Nestor Richards MD at 08/27/23 0039       Summary:Cross cover note                 Asked by nursing to place sliding scale insulin orders for patient.  Also added Accu-Chek  [facility].    Blood Sugar in Office          7/29/2023    21:40 7/30/2023    07:10 7/30/2023    10:45 8/26/2023    20:58   Blood Sugar in Office   Glucose 237  158  238  176         Electronically signed by Nestor Richards MD at 08/27/23 0040       Discharge Order (From admission, onward)       Start     Ordered    08/28/23 0827  Discharge patient  Once        Expected Discharge Date: 08/28/23   Expected Discharge Time: Morning   Discharge Disposition: Home or Self Care   Physician of Record for Attribution - Please select from Treatment Team: RASHEL FUENTES [[id number]]   Review needed by CMO to determine Physician of Record: No      Question Answer Comment   Physician of Record for Attribution - Please select from Treatment Team RASHEL FUENTES    Review needed by CMO to determine Physician of Record No        08/28/23 0831

## 2023-08-28 NOTE — PROGRESS NOTES
[facility]  INPATIENT WOUND & OSTOMY CARE    PROGRESS NOTE    Today's Date: 08/28/23    Patient Name: Yamileth Slater  MRN: [id number]  [facility]: [id number]  PCP: Kiersten Wilson APRN  Referring Provider: Parish Oviedo MD  Attending Provider: Parish Oviedo MD  Length of Stay: 2    SUBJECTIVE   Chief Complaint: foot wounds    HPI: Yamileth Slater is a 63 year old female I was consulted on for wound care/assessment. Patient has past medical history of ESKD, CHF, diabetes, CAD, GERD, obesity, hyperlipidemia, and obesity. Patient has wound to right second toe that is necrotic and stable. Patient has incision to right great toe from amputation. This wound is nearly healed also.      Visit Dx:    ICD-10-CM ICD-9-CM   1. Hyperkalemia  E87.5 276.7   2. Patient requiring acute dialysis  Z99.2 V45.11   3. Physical deconditioning  R53.81 799.3       Hospital Problem List:     Hyperkalemia    Noncompliance with renal dialysis      History:   Past Medical History:   Diagnosis Date    Acute blood loss anemia 04/16/2017    Likely due to gastric oozing at this time. - Dr. Duarte (GI) was consulted and has now signed off, will follow up outpatient - pill colonoscopy showed AVMs - continue to monitor    Acute cystitis with hematuria 03/31/2021 1/13: IV Rocephin 1 gm q 24 1/14 : transitioned  to omnicef 300mg. Urine cultures resulted and did not show growth. Omnicef discontinued as patient is asymptomatic    Altered mental status 01/09/2022    - AMS on presentation - initial ABG pH 7.3, CO2 34 - Procal 0.29 - UA negative for acute cysitits -CTA head wnl  - Empiric Zosyn and Vancomycin -Lactate 2.5 on admission  - blood cultures no growth at 24 hours      Anxiety     CAD (coronary artery disease) 04/24/2021    S/P 3 stents 5/1/2021 for [facility] Continue ASA 81mg & Clopidogrel 75mg Continue Atorvastatin 40mg    Carotid artery stenosis     CHF (congestive heart failure)     Chronic obstructive lung  disease     CKD (chronic kidney disease) stage 4, GFR 15-29 ml/min     CKD (chronic kidney disease), symptom management only, stage 5 10/05/2020    Results from last 7 days Lab Units 12/15/21 0548 12/14/21 1323 12/14/21 0916 CREATININE mg/dL 3.92* 3.21* 3.32*  Baseline creatinine 2-3 GFR 13-25 GFR 15 Dialysis MWF, sees Dr. Lauren Nephrology consult,, appreciate recommendations Continue Bumex 1mg bid daily Holding Bumex 2mg 4 times a week    Colonic polyp     Coronary arteriosclerosis     Diabetes mellitus     Diabetic neuropathy     Ear pain, right 10/18/2021    - canal trauma due to patient scratching and DMT2 - added cortisporin ear drops    Elevated troponin 10/12/2021    -most likely from CKD -Trending down -Neg chest pain    Generalized abdominal pain 07/01/2022    Could be due to initiation of tube feeds vs dyspepsia vs abdominal cramps related to no PO intake due to intubation vs constipation Continue current laxative regiment  If no bowel movement by this afternoon will consider enema    GERD (gastroesophageal reflux disease)     GI bleed 05/13/2021    - GI will follow up outpatient - Protonix 40mg daily - Avoid medical DVT prophy and use mechanical at this time instead. - Continue to monitor - pill colonoscopy results showed AVMs    History of transfusion     Hypercholesterolemia     Hyperosmolar hyperglycemic state (HHS) 06/25/2022    Serum glucose 605 on admission  Anion gap 16 PH 7.37 Bicarb 27.9 Continue fluids  Insulin drip with [facility] protocol  Anion gap closed around 10 AM, received one dose of Levamir subq, will stop insulin drip after 2 hrs      Hypertension     Hypokalemia 05/27/2022    Will replace as needed. Will be cautious in the setting of ESRD to avoid need for emergency dialysis   Monitor Qtc intervals on EKG      Hypomagnesemia 06/27/2021    Monitor and replace    Morbid obesity     Nephrolithiasis     On mechanically assisted ventilation 06/26/2022    Vent management and sedation orders  placed.  - [facility] intensivist group consulted for vent management appreciate recommendations  - plan to extubate today      Peripheral vascular disease     Pleural effusion on right 06/26/2022    CXR on 6/30/22 read as a small upper left pulmonary edema vs early pneumonia.  Last Echo 1/2022 EF 61-65 % Continue to monitor  Procal slightly improved, CRP improved On Linezolid and meropenum       PVD (peripheral vascular disease)     SIRS (systemic inflammatory response syndrome) 01/09/2022    Admission  - WBC 17.78   -   - RR 16 - 1/10: VSS/wnl - CXR - Mild pulm edema - Blood cultures no growth at 24 hours  - Procalcitonin 0.29 - UA : glucose 1000, negative Leucocytes/nitrate - Empiric Zosyn and Vancomcyin     Sleep apnea     Substance abuse     Vitamin D deficiency      Past Surgical History:   Procedure Laterality Date    AMPUTATION DIGIT Right 2/27/2023    Procedure: Excision of right first metatarsal head, right second toe amputation;  Surgeon: Jean Oliveira DPM;  Location: [facility];  Service: Podiatry;  Laterality: Right;    BELOW KNEE AMPUTATION Left 12/16/2022    Procedure: AMPUTATION BELOW KNEE, LEFT;  Surgeon: Huy White MD;  Location: [facility] OR;  Service: Orthopedics;  Laterality: Left;    CARDIAC CATHETERIZATION N/A 07/14/2020    CARDIAC CATHETERIZATION N/A 04/23/2021    Procedure: Left Heart Cath;  Surgeon: Melba Romo MD;  Location: [facility] CATH INVASIVE LOCATION;  Service: Cardiology;  Laterality: N/A;    CARDIAC CATHETERIZATION N/A 04/30/2021    Procedure: Percutaneous Coronary Intervention;  Surgeon: Russell Voss MD;  Location: [facility] CATH INVASIVE LOCATION;  Service: Cardiovascular;  Laterality: N/A;    CARDIAC CATHETERIZATION N/A 04/30/2021    Procedure: Stent NIKKI coronary;  Surgeon: Russell Voss MD;  Location: [facility] CATH INVASIVE LOCATION;  Service: Cardiovascular;  Laterality: N/A;    CARDIAC CATHETERIZATION Left 11/13/2021    Procedure:  Left Heart Cath;  Surgeon: Niall Rios MD;  Location: [facility] CATH INVASIVE LOCATION;  Service: Cardiology;  Laterality: Left;    CAROTID STENT Left     COLONOSCOPY      COLONOSCOPY N/A 05/14/2021    Procedure: COLONOSCOPY;  Surgeon: Mingo Duarte MD;  Location: [facility] ENDOSCOPY;  Service: Gastroenterology;  Laterality: N/A;    CORONARY ARTERY BYPASS GRAFT N/A 2013    CABG X 3    CYSTOSCOPY BLADDER STONE LITHOTRIPSY Bilateral     ENDOSCOPY N/A 04/12/2021    Procedure: ESOPHAGOGASTRODUODENOSCOPY;  Surgeon: Mingo Duarte MD;  Location: [facility] ENDOSCOPY;  Service: Gastroenterology;  Laterality: N/A;    ENDOSCOPY N/A 05/14/2021    Procedure: ESOPHAGOGASTRODUODENOSCOPY;  Surgeon: Mingo Duarte MD;  Location: [facility] ENDOSCOPY;  Service: Gastroenterology;  Laterality: N/A;    ENDOSCOPY N/A 01/28/2022    Procedure: ESOPHAGOGASTRODUODENOSCOPY;  Surgeon: Mingo Duarte MD;  Location: [facility] ENDOSCOPY;  Service: Gastroenterology;  Laterality: N/A;    HYSTERECTOMY      INTERVENTIONAL RADIOLOGY PROCEDURE N/A 10/21/2021    Procedure: tunneled central venous catheter placement;  Surgeon: Donnie Robles MD;  Location: [facility] ANGIO INVASIVE LOCATION;  Service: Interventional Radiology;  Laterality: N/A;    INTERVENTIONAL RADIOLOGY PROCEDURE N/A 01/27/2022    Procedure: tunneled central venous catheter placement;  Surgeon: Donnie Robles MD;  Location: [facility] ANGIO INVASIVE LOCATION;  Service: Interventional Radiology;  Laterality: N/A;    LAPAROSCOPIC TUBAL LIGATION      TUNNELED VENOUS CATHETER PLACEMENT       Social History     Socioeconomic History    Marital status: Legally      Spouse name: wesley dumont   Tobacco Use    Smoking status: Some Days     Packs/day: 0.25     Years: 46.00     Pack years: 11.50     Types: Cigarettes     Start date: 2/1/1999    Smokeless tobacco: Never   Vaping Use    Vaping Use: Every day    Substances: Nicotine    Devices: Disposable   Substance and  Sexual Activity    Alcohol use: No    Drug use: Not Currently     Types: Marijuana    Sexual activity: Defer       Allergies:  Allergies   Allergen Reactions    Adhesive Tape Hives, Other (See Comments) and Rash    Nsaids Hives    Latex Other (See Comments) and Hives    Other Itching     Bandaids, MRSA, SURECLOSE    Wound Dressing Adhesive Hives and Rash       Medications:    Current Facility-Administered Medications:     atorvastatin (LIPITOR) tablet 40 mg, 40 mg, Oral, Daily, Parish Oviedo MD, 40 mg at 08/28/23 0930    sennosides-docusate (PERICOLACE) 8.6-50 MG per tablet 2 tablet, 2 tablet, Oral, BID **AND** polyethylene glycol (MIRALAX) packet 17 g, 17 g, Oral, Daily PRN **AND** bisacodyl (DULCOLAX) EC tablet 5 mg, 5 mg, Oral, Daily PRN **AND** bisacodyl (DULCOLAX) suppository 10 mg, 10 mg, Rectal, Daily PRN, Parish Oviedo MD    Calcium Replacement - Follow Nurse / BPA Driven Protocol, , Does not apply, PRN, Parish Oviedo MD    carvedilol (COREG) tablet 12.5 mg, 12.5 mg, Oral, BID With Meals, Parish Oviedo MD, 12.5 mg at 08/28/23 0930    cetirizine (zyrTEC) tablet 10 mg, 10 mg, Oral, Nightly, Parish Oviedo MD, 10 mg at 08/27/23 2101    clopidogrel (PLAVIX) tablet 75 mg, 75 mg, Oral, Daily, Parish Oviedo MD, 75 mg at 08/28/23 0931    cyclobenzaprine (FLEXERIL) tablet 5 mg, 5 mg, Oral, BID, Parish Oviedo MD, 5 mg at 08/28/23 0931    dextrose (D50W) (25 g/50 mL) IV injection 25 g, 25 g, Intravenous, Q15 Min PRN, Nestor Richards MD    dextrose (D50W) (25 g/50 mL) IV injection 50 mL, 50 mL, Intravenous, Q1H PRN, Parish Oviedo MD, 50 mL at 08/26/23 1301    dextrose (GLUTOSE) oral gel 15 g, 15 g, Oral, Q15 Min PRN, Nestor Richards MD    docusate sodium (COLACE) capsule 100 mg, 100 mg, Oral, BID, Parish Oviedo MD    DULoxetine (CYMBALTA) DR capsule 20 mg, 20 mg, Oral, Daily, Parish Oviedo MD, 20 mg at 08/28/23 0930    furosemide (LASIX) tablet 20 mg, 20 mg, Oral, Daily, Parish Oviedo MD, 20 mg at  08/28/23 0930    gabapentin (NEURONTIN) capsule 300 mg, 300 mg, Oral, Daily, Parish Oviedo MD, 300 mg at 08/28/23 0937    glucagon HCl (Diagnostic) injection 1 mg, 1 mg, Intramuscular, Q15 Min PRN, Nestor Richards MD    heparin (porcine) 5000 UNIT/ML injection 5,000 Units, 5,000 Units, Subcutaneous, Q12H, Parish Oviedo MD, 5,000 Units at 08/27/23 2101    heparin (porcine) injection 2,000 Units, 2,000 Units, Intracatheter, PRN, Tee Jacobo MD    Insulin Aspart (novoLOG) injection 0-9 Units, 0-9 Units, Subcutaneous, 4x Daily AC & at Bedtime PRN, Nestor Richards MD, 4 Units at 08/27/23 1221    insulin detemir (LEVEMIR) injection 24 Units, 24 Units, Subcutaneous, Q12H, Parish Oviedo MD, 24 Units at 08/28/23 0936    ipratropium-albuterol (DUO-NEB) nebulizer solution 3 mL, 3 mL, Nebulization, 4x Daily PRN, Parish Oviedo MD    Magnesium Standard Dose Replacement - Follow Nurse / BPA Driven Protocol, , Does not apply, PRN, Parish Oviedo MD    memantine (NAMENDA) tablet 5 mg, 5 mg, Oral, BID, Parish Oviedo MD, 5 mg at 08/28/23 0937    nitroglycerin (NITROSTAT) SL tablet 0.4 mg, 0.4 mg, Sublingual, Q5 Min PRN, Parish Oviedo MD    ondansetron (ZOFRAN) tablet 4 mg, 4 mg, Oral, Q6H PRN **OR** ondansetron (ZOFRAN) injection 4 mg, 4 mg, Intravenous, Q6H PRN, Parish Oviedo MD    oxyCODONE (ROXICODONE) immediate release tablet 10 mg, 10 mg, Oral, TID, Parish Oviedo MD, 10 mg at 08/28/23 0933    pantoprazole (PROTONIX) EC tablet 40 mg, 40 mg, Oral, Nightly, Parish Oviedo MD, 40 mg at 08/27/23 2101    Phosphorus Replacement - Follow Nurse / BPA Driven Protocol, , Does not apply, PRKINJAL, Parish Oviedo MD    Potassium Replacement - Follow Nurse / BPA Driven Protocol, , Does not apply, PRIvelisse LAYTON Tomas, MD    promethazine (PHENERGAN) tablet 25 mg, 25 mg, Oral, Q6H PRN, Parish Oviedo MD    sevelamer (RENVELA) tablet 800 mg, 800 mg, Oral, TID With Meals, Parish Oviedo MD, 800 mg at 08/28/23 0931    sodium  chloride 0.9 % flush 10 mL, 10 mL, Intravenous, Q12H, Parish Oviedo MD, 10 mL at 08/28/23 0930    sodium chloride 0.9 % flush 10 mL, 10 mL, Intravenous, PRN, Parish Oviedo MD    sodium chloride 0.9 % infusion 40 mL, 40 mL, Intravenous, PRN, Parish Oviedo MD    sodium zirconium cyclosilicate (LOKELMA) pack 10 g, 10 g, Oral, TID, Parish Oviedo MD, 10 g at 08/27/23 2054    Review of Systems:  Review of Systems      OBJECTIVE     Vitals:    08/28/23 1036   BP: 168/79   Pulse: 79   Resp: 18   Temp: 97.8 °F (36.6 °C)   SpO2: 94%       PHYSICAL EXAM.  Physical Exam  Physical Exam  Vitals reviewed. Nursing notes reviewed.  Constitutional:       Appearance: Well-developed.   Musculoskeletal:         Cervical back: Normal range of motion and neck supple.   Skin:     General: Skin is warm and dry.      Coloration: Skin is not pale.      Findings: No erythema or rash. Right great toe amp incision wound,   Neurological:      Mental Status: Pt is alert and oriented to person, place, and time.   Psychiatric:         Behavior: Behavior normal.         Thought Content: Thought content normal.         Judgment: Judgment normal.       Results Review:  Lab Results (last 48 hours)       Procedure Component Value Units Date/Time    POC Glucose Once [[id number]]  (Normal) Collected: 08/28/23 0702    Specimen: Blood Updated: 08/28/23 0731     Glucose 116 mg/dL      Comment: RN NotifiedOperator: [id number] [facility] CenterMeter ID: [id number]       Phosphorus [[id number]]  (Abnormal) Collected: 08/28/23 0605    Specimen: Blood Updated: 08/28/23 0719     Phosphorus 8.3 mg/dL     Magnesium [[id number]]  (Normal) Collected: 08/28/23 0605    Specimen: Blood Updated: 08/28/23 0713     Magnesium 2.1 mg/dL     Basic Metabolic Panel [[id number]]  (Abnormal) Collected: 08/28/23 0605    Specimen: Blood Updated: 08/28/23 0713     Glucose 123 mg/dL      BUN 52 mg/dL      Creatinine 6.31 mg/dL      Sodium 135 mmol/L      Potassium 4.7 mmol/L       Chloride 98 mmol/L      CO2 22.0 mmol/L      Calcium 9.2 mg/dL      BUN/Creatinine Ratio 8.2     Anion Gap 15.0 mmol/L      eGFR 6.9 mL/min/1.73      Comment: <15 Indicative of kidney failure       Narrative:      GFR Normal >60  Chronic Kidney Disease <60  Kidney Failure <15      CBC & Differential [[id number]]  (Abnormal) Collected: 08/28/23 0605    Specimen: Blood Updated: 08/28/23 0651    Narrative:      The following orders were created for panel order CBC & Differential.  Procedure                               Abnormality         Status                     ---------                               -----------         ------                     CBC Auto Differential[[id number]]        Abnormal            Final result                 Please view results for these tests on the individual orders.    CBC Auto Differential [[id number]]  (Abnormal) Collected: 08/28/23 0605    Specimen: Blood Updated: 08/28/23 0651     WBC 5.09 10*3/mm3      RBC 2.92 10*6/mm3      Hemoglobin 8.8 g/dL      Hematocrit 27.0 %      MCV 92.5 fL      MCH 30.1 pg      MCHC 32.6 g/dL      RDW 15.2 %      RDW-SD 50.9 fl      MPV 8.9 fL      Platelets 163 10*3/mm3      Neutrophil % 39.7 %      Lymphocyte % 43.8 %      Monocyte % 10.2 %      Eosinophil % 4.5 %      Basophil % 1.6 %      Immature Grans % 0.2 %      Neutrophils, Absolute 2.02 10*3/mm3      Lymphocytes, Absolute 2.23 10*3/mm3      Monocytes, Absolute 0.52 10*3/mm3      Eosinophils, Absolute 0.23 10*3/mm3      Basophils, Absolute 0.08 10*3/mm3      Immature Grans, Absolute 0.01 10*3/mm3      nRBC 0.0 /100 WBC     POC Glucose Once [[id number]]  (Abnormal) Collected: 08/27/23 1938    Specimen: Blood Updated: 08/27/23 2012     Glucose 171 mg/dL      Comment: RN NotifiedOperator: [id number] KEVIN REAINDAMeter ID: [id number]       Extra Tubes [[id number]] Collected: 08/27/23 1542    Specimen: Blood, Venous Line Updated: 08/27/23 1645    Narrative:      The following orders were created for  panel order Extra Tubes.  Procedure                               Abnormality         Status                     ---------                               -----------         ------                     Lavender Top[[id number]]                                     Final result                 Please view results for these tests on the individual orders.    Lavender Top [[id number]] Collected: 08/27/23 1542    Specimen: Blood Updated: 08/27/23 1645     Extra Tube hold for add-on     Comment: Auto resulted       POC Glucose Once [[id number]]  (Abnormal) Collected: 08/27/23 1624    Specimen: Blood Updated: 08/27/23 1645     Glucose 151 mg/dL      Comment: RN NotifiedOperator: [id number] LAFFOON TROYMeter ID: [id number]       Potassium [[id number]]  (Abnormal) Collected: 08/27/23 1537    Specimen: Blood Updated: 08/27/23 1555     Potassium 5.9 mmol/L     POC Glucose Once [[id number]]  (Abnormal) Collected: 08/27/23 1113    Specimen: Blood Updated: 08/27/23 1139     Glucose 218 mg/dL      Comment: RN NotifiedOperator: [id number] LAFFOON TROYMeter ID: [id number]       POC Glucose Once [[id number]]  (Abnormal) Collected: 08/27/23 0741    Specimen: Blood Updated: 08/27/23 0831     Glucose 138 mg/dL      Comment: RN NotifiedOperator: [id number] LAFFOON TROYMeter ID: [id number]       Magnesium [[id number]]  (Normal) Collected: 08/27/23 0254    Specimen: Blood Updated: 08/27/23 0336     Magnesium 1.8 mg/dL     Basic Metabolic Panel [[id number]]  (Abnormal) Collected: 08/27/23 0254    Specimen: Blood Updated: 08/27/23 0328     Glucose 154 mg/dL      BUN 44 mg/dL      Creatinine 5.16 mg/dL      Sodium 134 mmol/L      Potassium 5.6 mmol/L      Chloride 98 mmol/L      CO2 25.0 mmol/L      Calcium 9.0 mg/dL      BUN/Creatinine Ratio 8.5     Anion Gap 11.0 mmol/L      eGFR 8.8 mL/min/1.73      Comment: <15 Indicative of kidney failure       Narrative:      GFR Normal >60  Chronic Kidney Disease <60  Kidney Failure <15      Phosphorus  [[id number]]  (Abnormal) Collected: 08/27/23 0254    Specimen: Blood Updated: 08/27/23 0328     Phosphorus 5.6 mg/dL     Hemoglobin A1c [[id number]]  (Normal) Collected: 08/27/23 0254    Specimen: Blood Updated: 08/27/23 0320     Hemoglobin A1C 5.40 %     Narrative:      Hemoglobin A1C Ranges:    Increased Risk for Diabetes  5.7% to 6.4%  Diabetes                     >= 6.5%  Diabetic Goal                < 7.0%    CBC & Differential [[id number]]  (Abnormal) Collected: 08/27/23 0254    Specimen: Blood Updated: 08/27/23 0311    Narrative:      The following orders were created for panel order CBC & Differential.  Procedure                               Abnormality         Status                     ---------                               -----------         ------                     CBC Auto Differential[[id number]]        Abnormal            Final result                 Please view results for these tests on the individual orders.    CBC Auto Differential [[id number]]  (Abnormal) Collected: 08/27/23 0254    Specimen: Blood Updated: 08/27/23 0311     WBC 8.84 10*3/mm3      RBC 3.07 10*6/mm3      Hemoglobin 9.0 g/dL      Hematocrit 28.9 %      MCV 94.1 fL      MCH 29.3 pg      MCHC 31.1 g/dL      RDW 15.7 %      RDW-SD 54.1 fl      MPV 8.6 fL      Platelets 171 10*3/mm3      Neutrophil % 80.1 %      Lymphocyte % 11.0 %      Monocyte % 5.9 %      Eosinophil % 1.6 %      Basophil % 0.8 %      Immature Grans % 0.6 %      Neutrophils, Absolute 7.09 10*3/mm3      Lymphocytes, Absolute 0.97 10*3/mm3      Monocytes, Absolute 0.52 10*3/mm3      Eosinophils, Absolute 0.14 10*3/mm3      Basophils, Absolute 0.07 10*3/mm3      Immature Grans, Absolute 0.05 10*3/mm3      nRBC 0.0 /100 WBC     POC Glucose Once [[id number]]  (Abnormal) Collected: 08/26/23 2058    Specimen: Blood Updated: 08/26/23 2112     Glucose 176 mg/dL      Comment: RN NotifiedOperator: [id number] LUCILA JUNEMeter ID: [id number]       Comprehensive Metabolic  Panel [[id number]]  (Abnormal) Collected: 08/26/23 1853    Specimen: Blood Updated: 08/26/23 1936     Glucose 135 mg/dL      BUN 42 mg/dL      Creatinine 4.44 mg/dL      Sodium 133 mmol/L      Potassium 5.1 mmol/L      Chloride 97 mmol/L      CO2 21.0 mmol/L      Calcium 9.1 mg/dL      Total Protein 8.3 g/dL      Albumin 3.5 g/dL      ALT (SGPT) 10 U/L      AST (SGOT) 20 U/L      Alkaline Phosphatase 220 U/L      Total Bilirubin 0.5 mg/dL      Globulin 4.8 gm/dL      A/G Ratio 0.7 g/dL      BUN/Creatinine Ratio 9.5     Anion Gap 15.0 mmol/L      eGFR 10.5 mL/min/1.73      Comment: <15 Indicative of kidney failure       Narrative:      GFR Normal >60  Chronic Kidney Disease <60  Kidney Failure <15      Comprehensive Metabolic Panel [[id number]]  (Abnormal) Collected: 08/26/23 1152    Specimen: Blood Updated: 08/26/23 1243     Glucose 109 mg/dL      BUN 73 mg/dL      Creatinine 6.75 mg/dL      Sodium 133 mmol/L      Potassium 8.8 mmol/L      Comment: Confirmed by repeat analysis          Chloride 101 mmol/L      CO2 18.0 mmol/L      Calcium 9.7 mg/dL      Total Protein 8.4 g/dL      Albumin 3.7 g/dL      ALT (SGPT) 9 U/L      AST (SGOT) 14 U/L      Alkaline Phosphatase 212 U/L      Total Bilirubin 0.4 mg/dL      Globulin 4.7 gm/dL      A/G Ratio 0.8 g/dL      BUN/Creatinine Ratio 10.8     Anion Gap 14.0 mmol/L      eGFR 6.4 mL/min/1.73      Comment: <15 Indicative of kidney failure       Narrative:      GFR Normal >60  Chronic Kidney Disease <60  Kidney Failure <15      Magnesium [[id number]]  (Abnormal) Collected: 08/26/23 1041    Specimen: Blood Updated: 08/26/23 1138     Magnesium 2.6 mg/dL     BNP [[id number]]  (Abnormal) Collected: 08/26/23 1041    Specimen: Blood Updated: 08/26/23 1130     proBNP 3,471.0 pg/mL     Narrative:      Among patients with dyspnea, NT-proBNP is highly sensitive for the detection of acute congestive heart failure. In addition NT-proBNP of <300 pg/ml effectively rules out acute congestive  heart failure with 99% negative predictive value.      CBC & Differential [[id number]]  (Abnormal) Collected: 08/26/23 1041    Specimen: Blood Updated: 08/26/23 1102    Narrative:      The following orders were created for panel order CBC & Differential.  Procedure                               Abnormality         Status                     ---------                               -----------         ------                     CBC Auto Differential[[id number]]        Abnormal            Final result                 Please view results for these tests on the individual orders.    CBC Auto Differential [[id number]]  (Abnormal) Collected: 08/26/23 1041    Specimen: Blood Updated: 08/26/23 1102     WBC 9.36 10*3/mm3      RBC 3.24 10*6/mm3      Hemoglobin 9.8 g/dL      Hematocrit 30.2 %      MCV 93.2 fL      MCH 30.2 pg      MCHC 32.5 g/dL      RDW 15.9 %      RDW-SD 54.4 fl      MPV 8.8 fL      Platelets 164 10*3/mm3      Neutrophil % 65.2 %      Lymphocyte % 26.2 %      Monocyte % 5.2 %      Eosinophil % 1.9 %      Basophil % 1.1 %      Immature Grans % 0.4 %      Neutrophils, Absolute 6.10 10*3/mm3      Lymphocytes, Absolute 2.45 10*3/mm3      Monocytes, Absolute 0.49 10*3/mm3      Eosinophils, Absolute 0.18 10*3/mm3      Basophils, Absolute 0.10 10*3/mm3      Immature Grans, Absolute 0.04 10*3/mm3      nRBC 0.0 /100 WBC           Imaging Results (Last 72 Hours)       Procedure Component Value Units Date/Time    CT Chest Without Contrast Diagnostic [[id number]] Collected: 08/26/23 2050     Updated: 08/26/23 2207    Narrative:        HISTORY:  Pneumonia suspected, uncomplicated, no prior imaging (Ped >= 3mo)    COMPARISON:  Same-day x-ray    TECHNIQUE:  Tomographic images of the chest were obtained without the administration of  intravenous contrast. Multiplanar reformatted images were provided for review.    FINDINGS:  Postprocedural changes of prior sternotomy. Grossly unremarkable appearance of  the soft tissues. Grossly  unremarkable appearance of the partially visualized  upper abdomen. Scattered atheromatous calcifications of the great vessels and  coronary arteries. Scattered mosaic-like attenuation of the lungs as can be seen  with small airways disease. There is left greater than right dependent platelike  atelectasis.    IMPRESSIONS:  1. Left greater than right platelike atelectasis corresponding to findings on  recent prior x-ray.    2. Scattered diffuse mosaic-like attenuation of the lungs as can be seen with  small airways disease.    XR Chest 1 View [[id number]] Collected: 08/26/23 1033     Updated: 08/26/23 1038    Narrative:      Comparison:  5/11/2023    FINDINGS:  Stable cardiomegaly.  Minimal patchy left suprahilar airspace opacity may  represent atelectasis or pneumonia in the appropriate setting.  No pleural  effusion or pneumothorax.                 ASSESSMENT/PLAN       Examination and evaluation of wound(s) was performed.    DIAGNOSIS:     Diabetes mellitus with foot ulcer    PLAN:     -Paint toe with betadine.  -Dressing placed to right great toe amp site.  -Follow up Wednesday in wound center.    Discussed findings and treatment plan including risks, benefits, and treatment options with patient in detail. Patient agreed with treatment plan.          This document has been electronically signed by BRIDGETT Corbett on August 28, 2023 11:02 CDT

## 2023-08-28 NOTE — DISCHARGE SUMMARY
[facility] Medicine Services  DISCHARGE SUMMARY       Date of Admission: 8/26/2023  Date of Discharge:  8/28/2023  Primary Care Physician: Kiersten Wilson APRN    Presenting Problem/History of Present Illness:  Hyperkalemia [E87.5]  Patient requiring acute dialysis [Z99.2]       Final Discharge Diagnoses:  Active Hospital Problems    Diagnosis     **Hyperkalemia     Noncompliance with renal dialysis        Consults:   Consults       Date and Time Order Name Status Description    8/27/2023 10:46 AM Inpatient Nephrology Consult      8/26/2023  2:38 PM Inpatient Nephrology Consult      7/29/2023 12:28 PM Inpatient Nephrology Consult Completed             Procedures Performed:                 Pertinent Test Results:   Lab Results (most recent)       Procedure Component Value Units Date/Time    POC Glucose Once [[id number]]  (Normal) Collected: 08/28/23 0702    Specimen: Blood Updated: 08/28/23 0731     Glucose 116 mg/dL      Comment: RN NotifiedOperator: [id number] MINERVA Ray ID: [id number]       Phosphorus [[id number]]  (Abnormal) Collected: 08/28/23 0605    Specimen: Blood Updated: 08/28/23 0719     Phosphorus 8.3 mg/dL     Magnesium [[id number]]  (Normal) Collected: 08/28/23 0605    Specimen: Blood Updated: 08/28/23 0713     Magnesium 2.1 mg/dL     Basic Metabolic Panel [[id number]]  (Abnormal) Collected: 08/28/23 0605    Specimen: Blood Updated: 08/28/23 0713     Glucose 123 mg/dL      BUN 52 mg/dL      Creatinine 6.31 mg/dL      Sodium 135 mmol/L      Potassium 4.7 mmol/L      Chloride 98 mmol/L      CO2 22.0 mmol/L      Calcium 9.2 mg/dL      BUN/Creatinine Ratio 8.2     Anion Gap 15.0 mmol/L      eGFR 6.9 mL/min/1.73      Comment: <15 Indicative of kidney failure       Narrative:      GFR Normal >60  Chronic Kidney Disease <60  Kidney Failure <15      CBC & Differential [[id number]]  (Abnormal) Collected: 08/28/23 0605    Specimen: Blood Updated:  08/28/23 0651    Narrative:      The following orders were created for panel order CBC & Differential.  Procedure                               Abnormality         Status                     ---------                               -----------         ------                     CBC Auto Differential[[id number]]        Abnormal            Final result                 Please view results for these tests on the individual orders.    CBC Auto Differential [[id number]]  (Abnormal) Collected: 08/28/23 0605    Specimen: Blood Updated: 08/28/23 0651     WBC 5.09 10*3/mm3      RBC 2.92 10*6/mm3      Hemoglobin 8.8 g/dL      Hematocrit 27.0 %      MCV 92.5 fL      MCH 30.1 pg      MCHC 32.6 g/dL      RDW 15.2 %      RDW-SD 50.9 fl      MPV 8.9 fL      Platelets 163 10*3/mm3      Neutrophil % 39.7 %      Lymphocyte % 43.8 %      Monocyte % 10.2 %      Eosinophil % 4.5 %      Basophil % 1.6 %      Immature Grans % 0.2 %      Neutrophils, Absolute 2.02 10*3/mm3      Lymphocytes, Absolute 2.23 10*3/mm3      Monocytes, Absolute 0.52 10*3/mm3      Eosinophils, Absolute 0.23 10*3/mm3      Basophils, Absolute 0.08 10*3/mm3      Immature Grans, Absolute 0.01 10*3/mm3      nRBC 0.0 /100 WBC     POC Glucose Once [[id number]]  (Abnormal) Collected: 08/27/23 1938    Specimen: Blood Updated: 08/27/23 2012     Glucose 171 mg/dL      Comment: RN NotifiedOperator: [id number] KEVIN ROSINDAMeter ID: [id number]       Extra Tubes [[id number]] Collected: 08/27/23 1542    Specimen: Blood, Venous Line Updated: 08/27/23 1645    Narrative:      The following orders were created for panel order Extra Tubes.  Procedure                               Abnormality         Status                     ---------                               -----------         ------                     Lavender Top[[id number]]                                     Final result                 Please view results for these tests on the individual orders.    Lavender Top [[id number]]  Collected: 08/27/23 1542    Specimen: Blood Updated: 08/27/23 1645     Extra Tube hold for add-on     Comment: Auto resulted       Potassium [[id number]]  (Abnormal) Collected: 08/27/23 1537    Specimen: Blood Updated: 08/27/23 1555     Potassium 5.9 mmol/L     Magnesium [[id number]]  (Normal) Collected: 08/27/23 0254    Specimen: Blood Updated: 08/27/23 0336     Magnesium 1.8 mg/dL     Basic Metabolic Panel [[id number]]  (Abnormal) Collected: 08/27/23 0254    Specimen: Blood Updated: 08/27/23 0328     Glucose 154 mg/dL      BUN 44 mg/dL      Creatinine 5.16 mg/dL      Sodium 134 mmol/L      Potassium 5.6 mmol/L      Chloride 98 mmol/L      CO2 25.0 mmol/L      Calcium 9.0 mg/dL      BUN/Creatinine Ratio 8.5     Anion Gap 11.0 mmol/L      eGFR 8.8 mL/min/1.73      Comment: <15 Indicative of kidney failure       Narrative:      GFR Normal >60  Chronic Kidney Disease <60  Kidney Failure <15      Phosphorus [[id number]]  (Abnormal) Collected: 08/27/23 0254    Specimen: Blood Updated: 08/27/23 0328     Phosphorus 5.6 mg/dL     Hemoglobin A1c [[id number]]  (Normal) Collected: 08/27/23 0254    Specimen: Blood Updated: 08/27/23 0320     Hemoglobin A1C 5.40 %     Narrative:      Hemoglobin A1C Ranges:    Increased Risk for Diabetes  5.7% to 6.4%  Diabetes                     >= 6.5%  Diabetic Goal                < 7.0%    CBC & Differential [[id number]]  (Abnormal) Collected: 08/27/23 0254    Specimen: Blood Updated: 08/27/23 0311    Narrative:      The following orders were created for panel order CBC & Differential.  Procedure                               Abnormality         Status                     ---------                               -----------         ------                     CBC Auto Differential[[id number]]        Abnormal            Final result                 Please view results for these tests on the individual orders.    CBC Auto Differential [[id number]]  (Abnormal) Collected: 08/27/23 0254    Specimen: Blood  Updated: 08/27/23 0311     WBC 8.84 10*3/mm3      RBC 3.07 10*6/mm3      Hemoglobin 9.0 g/dL      Hematocrit 28.9 %      MCV 94.1 fL      MCH 29.3 pg      MCHC 31.1 g/dL      RDW 15.7 %      RDW-SD 54.1 fl      MPV 8.6 fL      Platelets 171 10*3/mm3      Neutrophil % 80.1 %      Lymphocyte % 11.0 %      Monocyte % 5.9 %      Eosinophil % 1.6 %      Basophil % 0.8 %      Immature Grans % 0.6 %      Neutrophils, Absolute 7.09 10*3/mm3      Lymphocytes, Absolute 0.97 10*3/mm3      Monocytes, Absolute 0.52 10*3/mm3      Eosinophils, Absolute 0.14 10*3/mm3      Basophils, Absolute 0.07 10*3/mm3      Immature Grans, Absolute 0.05 10*3/mm3      nRBC 0.0 /100 WBC     Comprehensive Metabolic Panel [[id number]]  (Abnormal) Collected: 08/26/23 1853    Specimen: Blood Updated: 08/26/23 1936     Glucose 135 mg/dL      BUN 42 mg/dL      Creatinine 4.44 mg/dL      Sodium 133 mmol/L      Potassium 5.1 mmol/L      Chloride 97 mmol/L      CO2 21.0 mmol/L      Calcium 9.1 mg/dL      Total Protein 8.3 g/dL      Albumin 3.5 g/dL      ALT (SGPT) 10 U/L      AST (SGOT) 20 U/L      Alkaline Phosphatase 220 U/L      Total Bilirubin 0.5 mg/dL      Globulin 4.8 gm/dL      A/G Ratio 0.7 g/dL      BUN/Creatinine Ratio 9.5     Anion Gap 15.0 mmol/L      eGFR 10.5 mL/min/1.73      Comment: <15 Indicative of kidney failure       Narrative:      GFR Normal >60  Chronic Kidney Disease <60  Kidney Failure <15      Comprehensive Metabolic Panel [[id number]]  (Abnormal) Collected: 08/26/23 1152    Specimen: Blood Updated: 08/26/23 1243     Glucose 109 mg/dL      BUN 73 mg/dL      Creatinine 6.75 mg/dL      Sodium 133 mmol/L      Potassium 8.8 mmol/L      Comment: Confirmed by repeat analysis          Chloride 101 mmol/L      CO2 18.0 mmol/L      Calcium 9.7 mg/dL      Total Protein 8.4 g/dL      Albumin 3.7 g/dL      ALT (SGPT) 9 U/L      AST (SGOT) 14 U/L      Alkaline Phosphatase 212 U/L      Total Bilirubin 0.4 mg/dL      Globulin 4.7 gm/dL      A/G  Ratio 0.8 g/dL      BUN/Creatinine Ratio 10.8     Anion Gap 14.0 mmol/L      eGFR 6.4 mL/min/1.73      Comment: <15 Indicative of kidney failure       Narrative:      GFR Normal >60  Chronic Kidney Disease <60  Kidney Failure <15      BNP [[id number]]  (Abnormal) Collected: 08/26/23 1041    Specimen: Blood Updated: 08/26/23 1130     proBNP 3,471.0 pg/mL     Narrative:      Among patients with dyspnea, NT-proBNP is highly sensitive for the detection of acute congestive heart failure. In addition NT-proBNP of <300 pg/ml effectively rules out acute congestive heart failure with 99% negative predictive value.            Imaging Results (Most Recent)       Procedure Component Value Units Date/Time    CT Chest Without Contrast Diagnostic [[id number]] Collected: 08/26/23 2050     Updated: 08/26/23 2207    Narrative:        HISTORY:  Pneumonia suspected, uncomplicated, no prior imaging (Ped >= 3mo)    COMPARISON:  Same-day x-ray    TECHNIQUE:  Tomographic images of the chest were obtained without the administration of  intravenous contrast. Multiplanar reformatted images were provided for review.    FINDINGS:  Postprocedural changes of prior sternotomy. Grossly unremarkable appearance of  the soft tissues. Grossly unremarkable appearance of the partially visualized  upper abdomen. Scattered atheromatous calcifications of the great vessels and  coronary arteries. Scattered mosaic-like attenuation of the lungs as can be seen  with small airways disease. There is left greater than right dependent platelike  atelectasis.    IMPRESSIONS:  1. Left greater than right platelike atelectasis corresponding to findings on  recent prior x-ray.    2. Scattered diffuse mosaic-like attenuation of the lungs as can be seen with  small airways disease.    XR Chest 1 View [[id number]] Collected: 08/26/23 1033     Updated: 08/26/23 1038    Narrative:      Comparison:  5/11/2023    FINDINGS:  Stable cardiomegaly.  Minimal patchy left suprahilar  airspace opacity may  represent atelectasis or pneumonia in the appropriate setting.  No pleural  effusion or pneumothorax.              Chief Complaint on Day of Discharge: none    Hospital Course:  The patient is a 63 y.o. female who presented to [facility] with hyperkalemia.   63-year-old female presented to the ER after missing HD on Thursday and Saturday. During evaluation it was noted that potassium is 8.8. Relevant past medical history: HTN(stable, increased risk stroke, rupture), Hyperlipidemia(stable, increased risk cardiovascular events), Diabetes Mellitus(stable, increased risk cardiovascular events), Obesity(uncontrolled, increased risk cardiovascular events) and Chronic Kidney Disease(stable, increased risk renal failure), COPD with chronic, O2 use over the last 3 years, coronary artery disease multiple interventions, peripheral vascular disease multiple distal intervention, poorly controlled diabetes with diabetic foot infection. history of MRSA CRE, on dialysis, poor follow-up.  former smoker.  Quit 1999. Multiple hospitalizations over the past few years.  Multiple hospital admissions related to diabetic foot infections was transferred to [facility] in [facility] for infectious disease evaluation.  She subsequently underwent vascular evaluation and bilateral lower extremity arteriogram with intervention to both distal SFA and popliteal, she also went left transmetatarsal amputation at that time.  Surgical site dehiscence of left transmit site has had prolonged wound care with poor healing eventually required L BK which has healed performed in 12/2022.    2013 CABG x3  2014 attempted left carotid endarterectomy, procedure aborted unable to find carotid (Dr. Aguayo)  5/2014 L Transfemoral stent (Dr. Robles)  2014 arteriogram: PTA right SFA stent (Dr. Robles)  11/2021 LHC: Distal left main ostial circumflex stenosis, patent LIMA to LAD, 100% occluded RCA with occluded SVG to  "RCA  11/2021 PTA stent distal left main [facility]     1/2022 tunnel cath placement     9/15/2022 bilateral lower extremity arteriogram: PTA proximal SFA, PTA and stent stenosis right SFA, PTA/stent right popliteal.  Left SFA PTA stent Olevia 6 x 40 mm 6 x 120 mm due to dissection, in stent stenosis post intervention [facility]  9/19/2022 left transmetatarsal amputation [facility]  12/2022 LEFT BKA Dr. White  Assessment and Plan:     63 years old female patient with diabetes, vascular complications, transmetatarsal amputation right foot and BKA left leg, missed hemodialysis on Thursday and Saturday, came with severe hyperkalemia 8.8, dialytic emergency, nephrologist on board. Potassium went down to 5.1 - 5.6, today 4.7. DC ARB. CT chest showed atelectasis  Problems:  Dialytic emergency, hyperkalemia, pulmonary congestion. Resolved  PVD  Mixed hyperlipidemia  Carotid stenosis  Type 2 diabetes  ESRD on  dialysis  Right foot wound  Condition on Discharge:  baseline good    Physical Exam on Discharge:  /63 (BP Location: Left arm, Patient Position: Lying)   Pulse 74   Temp 97.5 °F (36.4 °C) (Temporal)   Resp 18   Ht 160 cm (62.99\")   Wt 115 kg (252 lb 11.2 oz)   LMP  (LMP Unknown)   SpO2 95%   BMI 44.78 kg/m²   Physical Exam  Constitutional:       Appearance: Normal appearance.   HENT:      Head: Normocephalic and atraumatic.      Right Ear: Tympanic membrane normal.      Left Ear: Tympanic membrane normal.      Nose: Nose normal.      Mouth/Throat:      Mouth: Mucous membranes are moist.   Eyes:      Pupils: Pupils are equal, round, and reactive to light.   Cardiovascular:      Rate and Rhythm: Normal rate and regular rhythm.      Pulses: Normal pulses.      Heart sounds: Normal heart sounds.   Pulmonary:      Effort: Pulmonary effort is normal.      Breath sounds: Normal breath sounds.   Abdominal:      General: Abdomen is flat. Bowel sounds are normal.      Palpations: Abdomen is soft. "   Musculoskeletal:         General: Normal range of motion.      Cervical back: Normal range of motion and neck supple.      Comments: Transmetatarsal amputation right foot and BKA left leg  Right upper arm AV fistula      Skin:     General: Skin is warm and dry.      Capillary Refill: Capillary refill takes less than 2 seconds.   Neurological:      General: No focal deficit present.      Mental Status: She is alert and oriented to person, place, and time.   Psychiatric:         Mood and Affect: Mood normal.         Behavior: Behavior normal.         Thought Content: Thought content normal.         Judgment: Judgment normal.         Discharge Disposition:  Home or Self Care    Discharge Medications:     Discharge Medications        Changes to Medications        Instructions Start Date   carvedilol 12.5 MG tablet  Commonly known as: COREG  What changed:   medication strength  how much to take   12.5 mg, Oral, 2 Times Daily With Meals             Continue These Medications        Instructions Start Date   albuterol sulfate  (90 Base) MCG/ACT inhaler  Commonly known as: PROVENTIL HFA;VENTOLIN HFA;PROAIR HFA   2 puffs, Inhalation, Every 4 Hours PRN      albuterol (2.5 MG/3ML) 0.083% nebulizer solution  Commonly known as: PROVENTIL   Inhale the contents of 1 vial by nebulization Every 4 (Four) Hours As Needed for Wheezing.      atorvastatin 40 MG tablet  Commonly known as: LIPITOR   1 tablet, Oral, Daily      Cetirizine HCl 10 MG capsule   1 capsule, Oral, Daily      clopidogrel 75 MG tablet  Commonly known as: PLAVIX   75 mg, Oral, Daily      [employer] Blood Glucose Meter w/Device kit   1 each, Does not apply, 3 Times Daily      cyclobenzaprine 5 MG tablet  Commonly known as: FLEXERIL   1 tablet, Oral, 2 Times Daily      Diclofenac Sodium 1 % gel gel  Commonly known as: VOLTAREN   4 g, Topical, 4 Times Daily PRN      docusate sodium 100 MG capsule  Commonly known as: COLACE   1 capsule, Oral, 2 Times Daily     "  DULoxetine 20 MG capsule  Commonly known as: CYMBALTA   20 mg, Oral, Daily      Easy Touch Insulin Syringe 30G X 5/16\" 0.5 ML misc  Generic drug: Insulin Syringe-Needle U-100   USE AS DIRECTED WITH LEVEMIR      Easy Touch Pen Needles 31G X 8 MM misc  Generic drug: Insulin Pen Needle   No dose, route, or frequency recorded.      NovoFine 30G X 8 MM misc  Generic drug: Insulin Pen Needle   As directed 4 times daily      B-D ULTRAFINE III SHORT PEN 31G X 8 MM misc  Generic drug: Insulin Pen Needle   Use to inject insulin 4 (Four) Times a Day as directed.      furosemide 20 MG tablet  Commonly known as: LASIX   1 tablet, Oral, Daily      gabapentin 300 MG capsule  Commonly known as: NEURONTIN   300 mg, Oral, Daily      hydrOXYzine 25 MG tablet  Commonly known as: ATARAX   1 tablet, Oral, Every 8 Hours PRN      insulin detemir 100 UNIT/ML injection  Commonly known as: LEVEMIR   24 Units, Subcutaneous, 2 Times Daily - Glucommander, pt takes in the am and pm      Insulin Lispro (1 Unit Dial) 100 UNIT/ML solution pen-injector  Commonly known as: HUMALOG   14-35 Units, Subcutaneous, 3 Times Daily With Meals, Takes 14 units with meals plus sliding scale Sliding scale:  150-199 take 4 units 200-249 take 8 units 250-299 take 12 units 300-349 take 16 units 350-400 take 20 units greater than 400, call physician      ipratropium-albuterol 0.5-2.5 mg/3 ml nebulizer  Commonly known as: DUO-NEB   3 mL, Nebulization, 4 Times Daily PRN      memantine 5 MG tablet  Commonly known as: NAMENDA   1 tablet, Oral, 2 Times Daily      nitroglycerin 0.4 MG SL tablet  Commonly known as: NITROSTAT   1 tablet, Sublingual, Every 5 Minutes PRN      O2  Commonly known as: OXYGEN   2 L/min, Inhalation, Continuous, Only uses when patient takes Trilogy      ondansetron 4 MG tablet  Commonly known as: ZOFRAN   4 mg, Oral, Every 6 Hours PRN      oxyCODONE 10 MG tablet  Commonly known as: ROXICODONE   10 mg, Oral, 3 Times Daily      pantoprazole 40 MG EC " tablet  Commonly known as: PROTONIX   40 mg, Oral, Nightly      promethazine 25 MG tablet  Commonly known as: PHENERGAN   1 tablet, Oral, Every 6 Hours PRN      sevelamer 800 MG tablet  Commonly known as: RENVELA   1 tablet, Oral, 3 Times Daily With Meals      Vitamin D (Ergocalciferol) 27975 units capsule   50,000 Units, Oral, Weekly, Take on Wednesday             Stop These Medications      losartan 25 MG tablet  Commonly known as: COZAAR              Discharge Diet:   Diet Instructions       Diet: Renal Diets; Low Sodium (2-3g); Regular Texture (IDDSI 7); Thin (IDDSI 0)      Discharge Diet: Renal Diets    Renal Diet: Low Sodium (2-3g)    Texture: Regular Texture (IDDSI 7)    Fluid Consistency: Thin (IDDSI 0)            Activity at Discharge:   Activity Instructions       Activity as Tolerated              Discharge Care Plan/Instructions: stop cozaar, follow in hemodialysis unit and primary care physician.    Follow-up Appointments:   Future Appointments   Date Time Provider Department Center   8/30/2023 10:45 AM Gill Lim, APRN [facility]   9/8/2023  2:30 PM [facility] CT 1 [facility] CT [facility]   9/11/2023  1:30 PM Donnie Robles MD [facility] None       Test Results Pending at Discharge:     The patient has current or prior documentation of left ventricular ejection fraction (LVEF) less than or equal to 40%, or moderate or severely depressed left ventricular systolic function. ; The patient was prescribed or already taking a beta-blocker.      This document has been electronically signed by Parish Oviedo MD on August 28, 2023 08:48 CDT      Time: 8:52

## 2023-08-29 NOTE — PAYOR COMM NOTE
"Komal Rodarte  [facility]  Case Management Extender  [phone] phone  [phone] fax      Ref# [id number]     Yamileth Slaterores (63 y.o. Female)       Date of Birth   1960    Social Security Number       Address   [address] [place] [address]    Home Phone   [phone]    MRN   [id number]       Sabianist   None    Marital Status   Legally                             Admission Date   8/26/23    Admission Type   Emergency    Admitting Provider   Parish Oviedo MD    Attending Provider       Department, Room/Bed   [address], 429/1       Discharge Date   8/28/2023    Discharge Disposition   Home or Self Care    Discharge Destination                                 Attending Provider: (none)   Allergies: Adhesive Tape, Nsaids, Latex, Other, Wound Dressing Adhesive    Isolation: None   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), 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)   Code Status: Prior    Ht: 160 cm (62.99\")   Wt: 115 kg (252 lb 11.2 oz)    Admission Cmt: None   Principal Problem: Hyperkalemia [E87.5]                   Active Insurance as of 8/26/2023       Primary Coverage       Payor Plan Insurance Group Employer/Plan Group    ANTHEM MEDICARE REPLACEMENT ANTHEM MEDICARE ADVANTAGE [id number]       Payor Plan Address Payor Plan Phone Number Payor Plan Fax Number Effective Dates    [address] [phone]  1/1/2022 - None Entered    [facility] [id number]         Subscriber Name Subscriber Birth Date Member ID       ALEKSMARYLINYAMILETHFRANCINE ZIMMERNGA 1960 [id number]               Secondary Coverage       Payor Plan Insurance Group Employer/Plan Group    [place]        Payor Plan Address Payor Plan Phone Number Payor Plan Fax Number Effective Dates    [address] [phone]  6/28/2019 - None Entered    [facility] [id number]         Subscriber Name Subscriber Birth Date Member ID       ALEKSYAMILETHFRANCINE CROOK " 1960 [id number]                     Emergency Contacts        (Rel.) Home Phone Work Phone Mobile Phone    RAMONA KHAN (Sister) [phone] -- [phone]    Yamileth Chavez (Daughter) [phone] -- [phone]    Suzanne Rivera (Daughter) [phone] -- [phone]                 Discharge Summary        Parish Oviedo MD at 08/28/23 0847              [facility] Medicine Services  DISCHARGE SUMMARY       Date of Admission: 8/26/2023  Date of Discharge:  8/28/2023  Primary Care Physician: Kiersten Wilson APRN    Presenting Problem/History of Present Illness:  Hyperkalemia [E87.5]  Patient requiring acute dialysis [Z99.2]       Final Discharge Diagnoses:  Active Hospital Problems    Diagnosis     **Hyperkalemia     Noncompliance with renal dialysis        Consults:   Consults       Date and Time Order Name Status Description    8/27/2023 10:46 AM Inpatient Nephrology Consult      8/26/2023  2:38 PM Inpatient Nephrology Consult      7/29/2023 12:28 PM Inpatient Nephrology Consult Completed             Procedures Performed:                 Pertinent Test Results:   Lab Results (most recent)       Procedure Component Value Units Date/Time    POC Glucose Once [[id number]]  (Normal) Collected: 08/28/23 0702    Specimen: Blood Updated: 08/28/23 0731     Glucose 116 mg/dL      Comment: RN NotifiedOperator: [id number] MINERVA ANGELITAMeter ID: [id number]       Phosphorus [[id number]]  (Abnormal) Collected: 08/28/23 0605    Specimen: Blood Updated: 08/28/23 0719     Phosphorus 8.3 mg/dL     Magnesium [[id number]]  (Normal) Collected: 08/28/23 0605    Specimen: Blood Updated: 08/28/23 0713     Magnesium 2.1 mg/dL     Basic Metabolic Panel [[id number]]  (Abnormal) Collected: 08/28/23 0605    Specimen: Blood Updated: 08/28/23 0713     Glucose 123 mg/dL      BUN 52 mg/dL      Creatinine 6.31 mg/dL      Sodium 135 mmol/L      Potassium 4.7 mmol/L       Chloride 98 mmol/L      CO2 22.0 mmol/L      Calcium 9.2 mg/dL      BUN/Creatinine Ratio 8.2     Anion Gap 15.0 mmol/L      eGFR 6.9 mL/min/1.73      Comment: <15 Indicative of kidney failure       Narrative:      GFR Normal >60  Chronic Kidney Disease <60  Kidney Failure <15      CBC & Differential [[id number]]  (Abnormal) Collected: 08/28/23 0605    Specimen: Blood Updated: 08/28/23 0651    Narrative:      The following orders were created for panel order CBC & Differential.  Procedure                               Abnormality         Status                     ---------                               -----------         ------                     CBC Auto Differential[[id number]]        Abnormal            Final result                 Please view results for these tests on the individual orders.    CBC Auto Differential [[id number]]  (Abnormal) Collected: 08/28/23 0605    Specimen: Blood Updated: 08/28/23 0651     WBC 5.09 10*3/mm3      RBC 2.92 10*6/mm3      Hemoglobin 8.8 g/dL      Hematocrit 27.0 %      MCV 92.5 fL      MCH 30.1 pg      MCHC 32.6 g/dL      RDW 15.2 %      RDW-SD 50.9 fl      MPV 8.9 fL      Platelets 163 10*3/mm3      Neutrophil % 39.7 %      Lymphocyte % 43.8 %      Monocyte % 10.2 %      Eosinophil % 4.5 %      Basophil % 1.6 %      Immature Grans % 0.2 %      Neutrophils, Absolute 2.02 10*3/mm3      Lymphocytes, Absolute 2.23 10*3/mm3      Monocytes, Absolute 0.52 10*3/mm3      Eosinophils, Absolute 0.23 10*3/mm3      Basophils, Absolute 0.08 10*3/mm3      Immature Grans, Absolute 0.01 10*3/mm3      nRBC 0.0 /100 WBC     POC Glucose Once [[id number]]  (Abnormal) Collected: 08/27/23 1938    Specimen: Blood Updated: 08/27/23 2012     Glucose 171 mg/dL      Comment: RN NotifiedOperator: [id number] KEVIN REAINDAMeter ID: [id number]       Extra Tubes [[id number]] Collected: 08/27/23 1542    Specimen: Blood, Venous Line Updated: 08/27/23 1645    Narrative:      The following orders were created for  panel order Extra Tubes.  Procedure                               Abnormality         Status                     ---------                               -----------         ------                     Lavender Top[[id number]]                                     Final result                 Please view results for these tests on the individual orders.    Lavender Top [[id number]] Collected: 08/27/23 1542    Specimen: Blood Updated: 08/27/23 1645     Extra Tube hold for add-on     Comment: Auto resulted       Potassium [[id number]]  (Abnormal) Collected: 08/27/23 1537    Specimen: Blood Updated: 08/27/23 1555     Potassium 5.9 mmol/L     Magnesium [[id number]]  (Normal) Collected: 08/27/23 0254    Specimen: Blood Updated: 08/27/23 0336     Magnesium 1.8 mg/dL     Basic Metabolic Panel [[id number]]  (Abnormal) Collected: 08/27/23 0254    Specimen: Blood Updated: 08/27/23 0328     Glucose 154 mg/dL      BUN 44 mg/dL      Creatinine 5.16 mg/dL      Sodium 134 mmol/L      Potassium 5.6 mmol/L      Chloride 98 mmol/L      CO2 25.0 mmol/L      Calcium 9.0 mg/dL      BUN/Creatinine Ratio 8.5     Anion Gap 11.0 mmol/L      eGFR 8.8 mL/min/1.73      Comment: <15 Indicative of kidney failure       Narrative:      GFR Normal >60  Chronic Kidney Disease <60  Kidney Failure <15      Phosphorus [[id number]]  (Abnormal) Collected: 08/27/23 0254    Specimen: Blood Updated: 08/27/23 0328     Phosphorus 5.6 mg/dL     Hemoglobin A1c [[id number]]  (Normal) Collected: 08/27/23 0254    Specimen: Blood Updated: 08/27/23 0320     Hemoglobin A1C 5.40 %     Narrative:      Hemoglobin A1C Ranges:    Increased Risk for Diabetes  5.7% to 6.4%  Diabetes                     >= 6.5%  Diabetic Goal                < 7.0%    CBC & Differential [[id number]]  (Abnormal) Collected: 08/27/23 0254    Specimen: Blood Updated: 08/27/23 0311    Narrative:      The following orders were created for panel order CBC & Differential.  Procedure                                Abnormality         Status                     ---------                               -----------         ------                     CBC Auto Differential[[id number]]        Abnormal            Final result                 Please view results for these tests on the individual orders.    CBC Auto Differential [[id number]]  (Abnormal) Collected: 08/27/23 0254    Specimen: Blood Updated: 08/27/23 0311     WBC 8.84 10*3/mm3      RBC 3.07 10*6/mm3      Hemoglobin 9.0 g/dL      Hematocrit 28.9 %      MCV 94.1 fL      MCH 29.3 pg      MCHC 31.1 g/dL      RDW 15.7 %      RDW-SD 54.1 fl      MPV 8.6 fL      Platelets 171 10*3/mm3      Neutrophil % 80.1 %      Lymphocyte % 11.0 %      Monocyte % 5.9 %      Eosinophil % 1.6 %      Basophil % 0.8 %      Immature Grans % 0.6 %      Neutrophils, Absolute 7.09 10*3/mm3      Lymphocytes, Absolute 0.97 10*3/mm3      Monocytes, Absolute 0.52 10*3/mm3      Eosinophils, Absolute 0.14 10*3/mm3      Basophils, Absolute 0.07 10*3/mm3      Immature Grans, Absolute 0.05 10*3/mm3      nRBC 0.0 /100 WBC     Comprehensive Metabolic Panel [[id number]]  (Abnormal) Collected: 08/26/23 1853    Specimen: Blood Updated: 08/26/23 1936     Glucose 135 mg/dL      BUN 42 mg/dL      Creatinine 4.44 mg/dL      Sodium 133 mmol/L      Potassium 5.1 mmol/L      Chloride 97 mmol/L      CO2 21.0 mmol/L      Calcium 9.1 mg/dL      Total Protein 8.3 g/dL      Albumin 3.5 g/dL      ALT (SGPT) 10 U/L      AST (SGOT) 20 U/L      Alkaline Phosphatase 220 U/L      Total Bilirubin 0.5 mg/dL      Globulin 4.8 gm/dL      A/G Ratio 0.7 g/dL      BUN/Creatinine Ratio 9.5     Anion Gap 15.0 mmol/L      eGFR 10.5 mL/min/1.73      Comment: <15 Indicative of kidney failure       Narrative:      GFR Normal >60  Chronic Kidney Disease <60  Kidney Failure <15      Comprehensive Metabolic Panel [[id number]]  (Abnormal) Collected: 08/26/23 1152    Specimen: Blood Updated: 08/26/23 1243     Glucose 109 mg/dL      BUN 73 mg/dL       Creatinine 6.75 mg/dL      Sodium 133 mmol/L      Potassium 8.8 mmol/L      Comment: Confirmed by repeat analysis          Chloride 101 mmol/L      CO2 18.0 mmol/L      Calcium 9.7 mg/dL      Total Protein 8.4 g/dL      Albumin 3.7 g/dL      ALT (SGPT) 9 U/L      AST (SGOT) 14 U/L      Alkaline Phosphatase 212 U/L      Total Bilirubin 0.4 mg/dL      Globulin 4.7 gm/dL      A/G Ratio 0.8 g/dL      BUN/Creatinine Ratio 10.8     Anion Gap 14.0 mmol/L      eGFR 6.4 mL/min/1.73      Comment: <15 Indicative of kidney failure       Narrative:      GFR Normal >60  Chronic Kidney Disease <60  Kidney Failure <15      BNP [[id number]]  (Abnormal) Collected: 08/26/23 1041    Specimen: Blood Updated: 08/26/23 1130     proBNP 3,471.0 pg/mL     Narrative:      Among patients with dyspnea, NT-proBNP is highly sensitive for the detection of acute congestive heart failure. In addition NT-proBNP of <300 pg/ml effectively rules out acute congestive heart failure with 99% negative predictive value.            Imaging Results (Most Recent)       Procedure Component Value Units Date/Time    CT Chest Without Contrast Diagnostic [[id number]] Collected: 08/26/23 2050     Updated: 08/26/23 2207    Narrative:        HISTORY:  Pneumonia suspected, uncomplicated, no prior imaging (Ped >= 3mo)    COMPARISON:  Same-day x-ray    TECHNIQUE:  Tomographic images of the chest were obtained without the administration of  intravenous contrast. Multiplanar reformatted images were provided for review.    FINDINGS:  Postprocedural changes of prior sternotomy. Grossly unremarkable appearance of  the soft tissues. Grossly unremarkable appearance of the partially visualized  upper abdomen. Scattered atheromatous calcifications of the great vessels and  coronary arteries. Scattered mosaic-like attenuation of the lungs as can be seen  with small airways disease. There is left greater than right dependent platelike  atelectasis.    IMPRESSIONS:  1. Left greater  than right platelike atelectasis corresponding to findings on  recent prior x-ray.    2. Scattered diffuse mosaic-like attenuation of the lungs as can be seen with  small airways disease.    XR Chest 1 View [[id number]] Collected: 08/26/23 1033     Updated: 08/26/23 1038    Narrative:      Comparison:  5/11/2023    FINDINGS:  Stable cardiomegaly.  Minimal patchy left suprahilar airspace opacity may  represent atelectasis or pneumonia in the appropriate setting.  No pleural  effusion or pneumothorax.              Chief Complaint on Day of Discharge: none    Hospital Course:  The patient is a 63 y.o. female who presented to [facility] with hyperkalemia.   63-year-old female presented to the ER after missing HD on Thursday and Saturday. During evaluation it was noted that potassium is 8.8. Relevant past medical history: HTN(stable, increased risk stroke, rupture), Hyperlipidemia(stable, increased risk cardiovascular events), Diabetes Mellitus(stable, increased risk cardiovascular events), Obesity(uncontrolled, increased risk cardiovascular events) and Chronic Kidney Disease(stable, increased risk renal failure), COPD with chronic, O2 use over the last 3 years, coronary artery disease multiple interventions, peripheral vascular disease multiple distal intervention, poorly controlled diabetes with diabetic foot infection. history of MRSA CRE, on dialysis, poor follow-up.  former smoker.  Quit 1999. Multiple hospitalizations over the past few years.  Multiple hospital admissions related to diabetic foot infections was transferred to [facility] in [facility] for infectious disease evaluation.  She subsequently underwent vascular evaluation and bilateral lower extremity arteriogram with intervention to both distal SFA and popliteal, she also went left transmetatarsal amputation at that time.  Surgical site dehiscence of left transmit site has had prolonged wound care with poor healing eventually  "required L BK which has healed performed in 12/2022.    2013 CABG x3  2014 attempted left carotid endarterectomy, procedure aborted unable to find carotid (Dr. Aguayo)  5/2014 L Transfemoral stent (Dr. Robles)  2014 arteriogram: PTA right SFA stent (Dr. Robles)  11/2021 LHC: Distal left main ostial circumflex stenosis, patent LIMA to LAD, 100% occluded RCA with occluded SVG to RCA  11/2021 PTA stent distal left main [facility]     1/2022 tunnel cath placement     9/15/2022 bilateral lower extremity arteriogram: PTA proximal SFA, PTA and stent stenosis right SFA, PTA/stent right popliteal.  Left SFA PTA stent Olevia 6 x 40 mm 6 x 120 mm due to dissection, in stent stenosis post intervention [facility]  9/19/2022 left transmetatarsal amputation [facility]  12/2022 LEFT BKA Dr. White  Assessment and Plan:     63 years old female patient with diabetes, vascular complications, transmetatarsal amputation right foot and BKA left leg, missed hemodialysis on Thursday and Saturday, came with severe hyperkalemia 8.8, dialytic emergency, nephrologist on board. Potassium went down to 5.1 - 5.6, today 4.7. DC ARB. CT chest showed atelectasis  Problems:  Dialytic emergency, hyperkalemia, pulmonary congestion. Resolved  PVD  Mixed hyperlipidemia  Carotid stenosis  Type 2 diabetes  ESRD on  dialysis  Right foot wound  Condition on Discharge:  baseline good    Physical Exam on Discharge:  /63 (BP Location: Left arm, Patient Position: Lying)   Pulse 74   Temp 97.5 °F (36.4 °C) (Temporal)   Resp 18   Ht 160 cm (62.99\")   Wt 115 kg (252 lb 11.2 oz)   LMP  (LMP Unknown)   SpO2 95%   BMI 44.78 kg/m²   Physical Exam  Constitutional:       Appearance: Normal appearance.   HENT:      Head: Normocephalic and atraumatic.      Right Ear: Tympanic membrane normal.      Left Ear: Tympanic membrane normal.      Nose: Nose normal.      Mouth/Throat:      Mouth: Mucous membranes are moist.   Eyes:      Pupils: Pupils " are equal, round, and reactive to light.   Cardiovascular:      Rate and Rhythm: Normal rate and regular rhythm.      Pulses: Normal pulses.      Heart sounds: Normal heart sounds.   Pulmonary:      Effort: Pulmonary effort is normal.      Breath sounds: Normal breath sounds.   Abdominal:      General: Abdomen is flat. Bowel sounds are normal.      Palpations: Abdomen is soft.   Musculoskeletal:         General: Normal range of motion.      Cervical back: Normal range of motion and neck supple.      Comments: Transmetatarsal amputation right foot and BKA left leg  Right upper arm AV fistula      Skin:     General: Skin is warm and dry.      Capillary Refill: Capillary refill takes less than 2 seconds.   Neurological:      General: No focal deficit present.      Mental Status: She is alert and oriented to person, place, and time.   Psychiatric:         Mood and Affect: Mood normal.         Behavior: Behavior normal.         Thought Content: Thought content normal.         Judgment: Judgment normal.         Discharge Disposition:  Home or Self Care    Discharge Medications:     Discharge Medications        Changes to Medications        Instructions Start Date   carvedilol 12.5 MG tablet  Commonly known as: COREG  What changed:   medication strength  how much to take   12.5 mg, Oral, 2 Times Daily With Meals             Continue These Medications        Instructions Start Date   albuterol sulfate  (90 Base) MCG/ACT inhaler  Commonly known as: PROVENTIL HFA;VENTOLIN HFA;PROAIR HFA   2 puffs, Inhalation, Every 4 Hours PRN      albuterol (2.5 MG/3ML) 0.083% nebulizer solution  Commonly known as: PROVENTIL   Inhale the contents of 1 vial by nebulization Every 4 (Four) Hours As Needed for Wheezing.      atorvastatin 40 MG tablet  Commonly known as: LIPITOR   1 tablet, Oral, Daily      Cetirizine HCl 10 MG capsule   1 capsule, Oral, Daily      clopidogrel 75 MG tablet  Commonly known as: PLAVIX   75 mg, Oral, Daily     "  [employer] Blood Glucose Meter w/Device kit   1 each, Does not apply, 3 Times Daily      cyclobenzaprine 5 MG tablet  Commonly known as: FLEXERIL   1 tablet, Oral, 2 Times Daily      Diclofenac Sodium 1 % gel gel  Commonly known as: VOLTAREN   4 g, Topical, 4 Times Daily PRN      docusate sodium 100 MG capsule  Commonly known as: COLACE   1 capsule, Oral, 2 Times Daily      DULoxetine 20 MG capsule  Commonly known as: CYMBALTA   20 mg, Oral, Daily      Easy Touch Insulin Syringe 30G X 5/16\" 0.5 ML misc  Generic drug: Insulin Syringe-Needle U-100   USE AS DIRECTED WITH LEVEMIR      Easy Touch Pen Needles 31G X 8 MM misc  Generic drug: Insulin Pen Needle   No dose, route, or frequency recorded.      NovoFine 30G X 8 MM misc  Generic drug: Insulin Pen Needle   As directed 4 times daily      B-D ULTRAFINE III SHORT PEN 31G X 8 MM misc  Generic drug: Insulin Pen Needle   Use to inject insulin 4 (Four) Times a Day as directed.      furosemide 20 MG tablet  Commonly known as: LASIX   1 tablet, Oral, Daily      gabapentin 300 MG capsule  Commonly known as: NEURONTIN   300 mg, Oral, Daily      hydrOXYzine 25 MG tablet  Commonly known as: ATARAX   1 tablet, Oral, Every 8 Hours PRN      insulin detemir 100 UNIT/ML injection  Commonly known as: LEVEMIR   24 Units, Subcutaneous, 2 Times Daily - Glucommander, pt takes in the am and pm      Insulin Lispro (1 Unit Dial) 100 UNIT/ML solution pen-injector  Commonly known as: HUMALOG   14-35 Units, Subcutaneous, 3 Times Daily With Meals, Takes 14 units with meals plus sliding scale Sliding scale:  150-199 take 4 units 200-249 take 8 units 250-299 take 12 units 300-349 take 16 units 350-400 take 20 units greater than 400, call physician      ipratropium-albuterol 0.5-2.5 mg/3 ml nebulizer  Commonly known as: DUO-NEB   3 mL, Nebulization, 4 Times Daily PRN      memantine 5 MG tablet  Commonly known as: NAMENDA   1 tablet, Oral, 2 Times Daily      nitroglycerin 0.4 MG SL tablet  Commonly " known as: NITROSTAT   1 tablet, Sublingual, Every 5 Minutes PRN      O2  Commonly known as: OXYGEN   2 L/min, Inhalation, Continuous, Only uses when patient takes Trilogy      ondansetron 4 MG tablet  Commonly known as: ZOFRAN   4 mg, Oral, Every 6 Hours PRN      oxyCODONE 10 MG tablet  Commonly known as: ROXICODONE   10 mg, Oral, 3 Times Daily      pantoprazole 40 MG EC tablet  Commonly known as: PROTONIX   40 mg, Oral, Nightly      promethazine 25 MG tablet  Commonly known as: PHENERGAN   1 tablet, Oral, Every 6 Hours PRN      sevelamer 800 MG tablet  Commonly known as: RENVELA   1 tablet, Oral, 3 Times Daily With Meals      Vitamin D (Ergocalciferol) 68747 units capsule   50,000 Units, Oral, Weekly, Take on Wednesday             Stop These Medications      losartan 25 MG tablet  Commonly known as: COZAAR              Discharge Diet:   Diet Instructions       Diet: Renal Diets; Low Sodium (2-3g); Regular Texture (IDDSI 7); Thin (IDDSI 0)      Discharge Diet: Renal Diets    Renal Diet: Low Sodium (2-3g)    Texture: Regular Texture (IDDSI 7)    Fluid Consistency: Thin (IDDSI 0)            Activity at Discharge:   Activity Instructions       Activity as Tolerated              Discharge Care Plan/Instructions: stop cozaar, follow in hemodialysis unit and primary care physician.    Follow-up Appointments:   Future Appointments   Date Time Provider Department [place]   8/30/2023 10:45 AM Gill Lim, [place] [facility]   9/8/2023  2:30 PM [facility] CT 1 [facility] CT [facility]   9/11/2023  1:30 PM Donnie Robles MD [facility] None       Test Results Pending at Discharge:     The patient has current or prior documentation of left ventricular ejection fraction (LVEF) less than or equal to 40%, or moderate or severely depressed left ventricular systolic function. ; The patient was prescribed or already taking a beta-blocker.      This document has been electronically signed by Parish Oviedo MD on August 28, 2023 08:48 CDT       Time: 8:52              Electronically signed by Parish Oviedo MD at 08/28/23 0852       Discharge Order (From admission, onward)       Start     Ordered    08/28/23 0827  Discharge patient  Once        Expected Discharge Date: 08/28/23   Expected Discharge Time: Morning   Discharge Disposition: Home or Self Care   Physician of Record for Attribution - Please select from Treatment Team: PARISH OVIEDO [[id number]]   Review needed by CMO to determine Physician of Record: No      Question Answer Comment   Physician of Record for Attribution - Please select from Treatment Team PARISH OVIEDO    Review needed by CMO to determine Physician of Record No        08/28/23 0831

## 2023-08-29 NOTE — OUTREACH NOTE
Prep Survey      Flowsheet Row Responses   [employer] facility patient discharged from? [place]   Is LACE score < 7 ? No   Eligibility Readm Mgmt   Discharge diagnosis Hyperkalemia   Does the patient have one of the following disease processes/diagnoses(primary or secondary)? Other   Does the patient have Home health ordered? No   Is there a DME ordered? No   General alerts for this patient Patient requiring acute dialysis   Prep survey completed? Yes            Sandra CLEMENTS - Registered Nurse

## 2023-08-30 ENCOUNTER — OFFICE VISIT (OUTPATIENT)
Dept: WOUND CARE | Facility: HOSPITAL | Age: 63
End: 2023-08-30
Payer: MEDICAID

## 2023-08-30 ENCOUNTER — READMISSION MANAGEMENT (OUTPATIENT)
Dept: CALL CENTER | Facility: HOSPITAL | Age: 63
End: 2023-08-30
Payer: MEDICAID

## 2023-08-30 ENCOUNTER — OUTSIDE FACILITY SERVICE (OUTPATIENT)
Dept: WOUND CARE | Facility: HOSPITAL | Age: 63
End: 2023-08-30
Payer: MEDICARE

## 2023-08-30 NOTE — OUTREACH NOTE
Medical Week 1 Survey      Flowsheet Row Responses   [facility] facility patient discharged from? [place]   Does the patient have one of the following disease processes/diagnoses(primary or secondary)? Other   Week 1 attempt successful? No   Unsuccessful attempts Attempt 1            Chelsy Chaney Registered Nurse

## 2023-09-01 ENCOUNTER — HOME HEALTH ADMISSION (OUTPATIENT)
Dept: HOME HEALTH SERVICES | Facility: HOME HEALTHCARE | Age: 63
End: 2023-09-01
Payer: MEDICARE

## 2023-09-01 ENCOUNTER — HOME CARE VISIT (OUTPATIENT)
Dept: HOME HEALTH SERVICES | Facility: CLINIC | Age: 63
End: 2023-09-01
Payer: MEDICARE

## 2023-09-01 VITALS
RESPIRATION RATE: 18 BRPM | TEMPERATURE: 97.9 F | HEART RATE: 74 BPM | OXYGEN SATURATION: 95 % | SYSTOLIC BLOOD PRESSURE: 120 MMHG | DIASTOLIC BLOOD PRESSURE: 60 MMHG

## 2023-09-01 PROCEDURE — G0299 HHS/HOSPICE OF RN EA 15 MIN: HCPCS

## 2023-09-01 PROCEDURE — G0151 HHCP-SERV OF PT,EA 15 MIN: HCPCS

## 2023-09-01 NOTE — Clinical Note
"Huddle  / Case Conference Note  Medical Necessity and focus of care:Pt presents s/p hospitalization 8/26/2023 to 8/28/2023 for Hyperkalemia and Patient requiring acute dialysis. Pt and sister (caregiver) also give hx that she has been dealing with the transmetatarsal amputation right foot 1st and second toes and 3rd is \"trying to spontaneously amputate.\" Pt says she still puts left leg on and walks some but not as much as when therapy was here last 2 months ago. Pt demos typical signs and sx dec strength LEs, dec endurance, gait deviations, dependence in trasnfers. Pt would benefit from skilled PT to address ddeficts and work toward safety and indep at home.    Patient Goal: get back walking some  Prior level of function:As of July pt was amb 50 feet with SBA/indep and walker and new prosthetic leg  Caregiver Status: pt lives with siste who uis CG    GG Discharge Goal: amb 50 5  Medication Issus:pt has meds   Environmental/ Physical issues: level entry to home and pt able to wheel self thru apartmetn  Any additional needs:no  Next week pt has multiple MD appts: dial tues/thurs, wound center Wedn, CT scan Fri, monday is labor day so will hold on PT per CG request until the next week  Based upon record review and collaboration conference, the recommended frequency for this patient is   SN-----  PT-----1w3 start 9-11  OT----  ST-----  HHA---  MSW---  Provider___Dr Oviedo_______ Notified @ ___9-1______ and agrees with POC    Please verify your eval  scores: (Answer the scores  that pertain to your area of focus remove the others)     3    3     5    "

## 2023-09-01 NOTE — HOME HEALTH
"Reason for referral: Pt presents s/p hospitalization 8/26/2023 to 8/28/2023 for Hyperkalemia and Patient requiring acute dialysis. Pt and sister (caregiver) also give hx that she has been dealing with the transmetatarsal amputation right foot 1st and second toes and 3rd is \"trying to spontaneously amputate.\" Pt says she still puts left leg on and walks some but not as much as when therapy was here last 2 months ago. Pt demos typical signs and sx dec strength LEs, dec endurance, gait deviations, dependence in trasnfers. Pt would benefit from skilled PT to address ddeficts and work toward safety and indep at home.    Past Med Hx: Hyperkalemia, essential hypertension,ESRD (end stage renal disease),GOING TO DIALYSIS T-TH & SAT, DDD (degenerative disc disease), lumbar,UTI (urinary tract infection) with pyuria, hx woubnds then left BKA 12-16-22. WOUND TO (R) FOOT, T2DM    Patient Goal: \"Get back to some more walking.\"    Prior level of function:As of July pt was amb 50 feet with SBA/indep and walker and new prosthetic leg    Numbness/tingling: lower legs     Precautions: Wc for most mobility in home, walk with sister short distances or wait to have therapy help restart    Restrictions:   Next week pt has multiple MD appts: dial tues/thurs, wound center Wedn, CT scan Fri, monday is labor day so will hold on PT per CG request until the next week"

## 2023-09-01 NOTE — Clinical Note
"HUDDLE/CASE CONFERENCE NOTE  9/1/23  SANDRA VARGAS, RN      PT IS A 62 YO WHITE FEMALE ADMITTED TO [facility] FOR SERVICES OF SN/PT/OT DUE TO RECENT HOSPITALIZATION FROM 8/26-8-8/28/23 DUE TO PT MISSING 2 APPOINTMENTS FOR DIALYSIS ON THURSDAY, 8/24 & WAS NOT PICKED UP ON SATURDAY, 8/26/23 AND NOTED TO HAVE K+ - 8.8. PT HAS A HX: T2DM, HTN,  CKD AND ON DIALYSIS ( TU-TH-SAT), COPD, CAD, PVD, AND PT IS A SMOKER. PT ALSO GOES TO  CARE EVERY WEDNESDAY.    FOCUS OF CARE: HYPERKALEMIA, WOUND CARE TO (R) FOOT - POST GREAT TOE AND 2ND TOE AMPUTATED, MEDICATION EDUCATION. PT/OT -  THERAPEUTIC EXERCISES, GTRENGTHENING, TRANSFER TRAINING & ADL'S.    PT SISTER, RAMONA MOVED IN WITH HER TO ASSIST WITH CARE. PT GETS AROUND APARTMENT IN A MANUAL W/C AND HAS A PROSTHESIS FOR HER (L) BKA AND AMBULATES IN APARTMENT AT TIMES AND HAS A WALKER. PT STATED IT GETS SORE AT TIMES AND DOES NOT WEAR PROSTHESIS MUCH AND HAS A ORTHOPEDIC SHOE FOR (R) FOOT.  SN DISCUSSED SAFETY, O2 SAFETY AND DRESSING TO (R) FOOT CHANGED, PAPERWORK SIGNED.     MEDICATION ISSUES: ON RECONCILING PT MEDICATIONS A MAJOR INTERACTION NOTED:  PANTOPRAZOLE & CLOPIDOGREL.    SKIN INTEGRITY: NO SKIN BREAKDOWN    PT GOAL: \" WANT MY WOUND TO HEAL AND TO BE ABLE TO WALK AGAIN.\"    CODE STATUS: FULL CODE    RECOMMENDED FREQUENCY FOR THIS PT:  SN -- 1WK9  PT -- EVALUATION  OT -- EVALUATION    NOTIFIED __  BRIDGETT DE LUNA __ 9/2/23 __ AND AGREES WITH POC."

## 2023-09-02 ENCOUNTER — APPOINTMENT (OUTPATIENT)
Dept: GENERAL RADIOLOGY | Facility: HOSPITAL | Age: 63
End: 2023-09-02
Payer: MEDICARE

## 2023-09-02 ENCOUNTER — HOME CARE VISIT (OUTPATIENT)
Dept: HOME HEALTH SERVICES | Facility: HOME HEALTHCARE | Age: 63
End: 2023-09-02
Payer: MEDICARE

## 2023-09-02 ENCOUNTER — HOSPITAL ENCOUNTER (EMERGENCY)
Facility: HOSPITAL | Age: 63
Discharge: HOME | End: 2023-09-02
Attending: EMERGENCY MEDICINE
Payer: MEDICARE

## 2023-09-02 VITALS
OXYGEN SATURATION: 95 % | BODY MASS INDEX: 45.08 KG/M2 | RESPIRATION RATE: 16 BRPM | HEART RATE: 68 BPM | WEIGHT: 245 LBS | DIASTOLIC BLOOD PRESSURE: 70 MMHG | HEIGHT: 62 IN | SYSTOLIC BLOOD PRESSURE: 118 MMHG | TEMPERATURE: 97.7 F

## 2023-09-02 VITALS
HEART RATE: 59 BPM | DIASTOLIC BLOOD PRESSURE: 100 MMHG | TEMPERATURE: 98 F | WEIGHT: 253 LBS | OXYGEN SATURATION: 96 % | HEIGHT: 63 IN | BODY MASS INDEX: 44.83 KG/M2 | RESPIRATION RATE: 18 BRPM | SYSTOLIC BLOOD PRESSURE: 142 MMHG

## 2023-09-02 DIAGNOSIS — N18.6 STAGE 5 CHRONIC KIDNEY DISEASE ON CHRONIC DIALYSIS: Primary | ICD-10-CM

## 2023-09-02 DIAGNOSIS — N39.0 URINARY TRACT INFECTION WITHOUT HEMATURIA, SITE UNSPECIFIED: ICD-10-CM

## 2023-09-02 DIAGNOSIS — Z99.2 STAGE 5 CHRONIC KIDNEY DISEASE ON CHRONIC DIALYSIS: Primary | ICD-10-CM

## 2023-09-02 LAB
ALBUMIN SERPL-MCNC: 3.4 G/DL (ref 3.5–5.2)
ALBUMIN/GLOB SERPL: 0.8 G/DL
ALP SERPL-CCNC: 179 U/L (ref 39–117)
ALT SERPL W P-5'-P-CCNC: 8 U/L (ref 1–33)
ANION GAP SERPL CALCULATED.3IONS-SCNC: 11 MMOL/L (ref 5–15)
AST SERPL-CCNC: 14 U/L (ref 1–32)
BACTERIA UR QL AUTO: ABNORMAL /HPF
BASOPHILS # BLD AUTO: 0.05 10*3/MM3 (ref 0–0.2)
BASOPHILS NFR BLD AUTO: 0.9 % (ref 0–1.5)
BILIRUB SERPL-MCNC: 0.3 MG/DL (ref 0–1.2)
BILIRUB UR QL STRIP: NEGATIVE
BUN SERPL-MCNC: 38 MG/DL (ref 8–23)
BUN/CREAT SERPL: 8.1 (ref 7–25)
CALCIUM SPEC-SCNC: 9.4 MG/DL (ref 8.6–10.5)
CHLORIDE SERPL-SCNC: 99 MMOL/L (ref 98–107)
CLARITY UR: ABNORMAL
CO2 SERPL-SCNC: 26 MMOL/L (ref 22–29)
COLOR UR: YELLOW
CREAT SERPL-MCNC: 4.72 MG/DL (ref 0.57–1)
DEPRECATED RDW RBC AUTO: 53.1 FL (ref 37–54)
EGFRCR SERPLBLD CKD-EPI 2021: 9.8 ML/MIN/1.73
EOSINOPHIL # BLD AUTO: 0.2 10*3/MM3 (ref 0–0.4)
EOSINOPHIL NFR BLD AUTO: 3.4 % (ref 0.3–6.2)
ERYTHROCYTE [DISTWIDTH] IN BLOOD BY AUTOMATED COUNT: 15.2 % (ref 12.3–15.4)
FLUAV SUBTYP SPEC NAA+PROBE: NOT DETECTED
FLUBV RNA ISLT QL NAA+PROBE: NOT DETECTED
GEN 5 2HR TROPONIN T REFLEX: 47 NG/L
GLOBULIN UR ELPH-MCNC: 4.4 GM/DL
GLUCOSE SERPL-MCNC: 164 MG/DL (ref 65–99)
GLUCOSE UR STRIP-MCNC: ABNORMAL MG/DL
HCT VFR BLD AUTO: 26.8 % (ref 34–46.6)
HGB BLD-MCNC: 8.4 G/DL (ref 12–15.9)
HGB UR QL STRIP.AUTO: ABNORMAL
HOLD SPECIMEN: NORMAL
HOLD SPECIMEN: NORMAL
HYALINE CASTS UR QL AUTO: ABNORMAL /LPF
IMM GRANULOCYTES # BLD AUTO: 0.01 10*3/MM3 (ref 0–0.05)
IMM GRANULOCYTES NFR BLD AUTO: 0.2 % (ref 0–0.5)
KETONES UR QL STRIP: NEGATIVE
LEUKOCYTE ESTERASE UR QL STRIP.AUTO: ABNORMAL
LYMPHOCYTES # BLD AUTO: 2.25 10*3/MM3 (ref 0.7–3.1)
LYMPHOCYTES NFR BLD AUTO: 38.6 % (ref 19.6–45.3)
MAGNESIUM SERPL-MCNC: 2.1 MG/DL (ref 1.6–2.4)
MCH RBC QN AUTO: 29.6 PG (ref 26.6–33)
MCHC RBC AUTO-ENTMCNC: 31.3 G/DL (ref 31.5–35.7)
MCV RBC AUTO: 94.4 FL (ref 79–97)
MONOCYTES # BLD AUTO: 0.56 10*3/MM3 (ref 0.1–0.9)
MONOCYTES NFR BLD AUTO: 9.6 % (ref 5–12)
NEUTROPHILS NFR BLD AUTO: 2.76 10*3/MM3 (ref 1.7–7)
NEUTROPHILS NFR BLD AUTO: 47.3 % (ref 42.7–76)
NITRITE UR QL STRIP: NEGATIVE
NRBC BLD AUTO-RTO: 0 /100 WBC (ref 0–0.2)
PH UR STRIP.AUTO: 8.5 [PH] (ref 5–9)
PLATELET # BLD AUTO: 187 10*3/MM3 (ref 140–450)
PMV BLD AUTO: 8.8 FL (ref 6–12)
POTASSIUM SERPL-SCNC: 5.5 MMOL/L (ref 3.5–5.2)
PROT SERPL-MCNC: 7.8 G/DL (ref 6–8.5)
PROT UR QL STRIP: ABNORMAL
RBC # BLD AUTO: 2.84 10*6/MM3 (ref 3.77–5.28)
RBC # UR STRIP: ABNORMAL /HPF
REF LAB TEST METHOD: ABNORMAL
SARS-COV-2 RNA RESP QL NAA+PROBE: NOT DETECTED
SODIUM SERPL-SCNC: 136 MMOL/L (ref 136–145)
SP GR UR STRIP: 1.01 (ref 1–1.03)
SQUAMOUS #/AREA URNS HPF: ABNORMAL /HPF
T4 FREE SERPL-MCNC: 1.17 NG/DL (ref 0.93–1.7)
TROPONIN T DELTA: 0 NG/L
TROPONIN T SERPL HS-MCNC: 47 NG/L
TSH SERPL DL<=0.05 MIU/L-ACNC: 0.1 UIU/ML (ref 0.27–4.2)
UROBILINOGEN UR QL STRIP: ABNORMAL
WBC # UR STRIP: ABNORMAL /HPF
WBC NRBC COR # BLD: 5.83 10*3/MM3 (ref 3.4–10.8)
WHOLE BLOOD HOLD COAG: NORMAL

## 2023-09-02 PROCEDURE — 84484 ASSAY OF TROPONIN QUANT: CPT | Performed by: EMERGENCY MEDICINE

## 2023-09-02 PROCEDURE — 80053 COMPREHEN METABOLIC PANEL: CPT | Performed by: EMERGENCY MEDICINE

## 2023-09-02 PROCEDURE — 73130 X-RAY EXAM OF HAND: CPT

## 2023-09-02 PROCEDURE — 87636 SARSCOV2 & INF A&B AMP PRB: CPT | Performed by: EMERGENCY MEDICINE

## 2023-09-02 PROCEDURE — 71045 X-RAY EXAM CHEST 1 VIEW: CPT

## 2023-09-02 PROCEDURE — 36415 COLL VENOUS BLD VENIPUNCTURE: CPT

## 2023-09-02 PROCEDURE — 99283 EMERGENCY DEPT VISIT LOW MDM: CPT

## 2023-09-02 PROCEDURE — 83735 ASSAY OF MAGNESIUM: CPT | Performed by: EMERGENCY MEDICINE

## 2023-09-02 PROCEDURE — 84439 ASSAY OF FREE THYROXINE: CPT | Performed by: EMERGENCY MEDICINE

## 2023-09-02 PROCEDURE — 73590 X-RAY EXAM OF LOWER LEG: CPT

## 2023-09-02 PROCEDURE — 81001 URINALYSIS AUTO W/SCOPE: CPT | Performed by: EMERGENCY MEDICINE

## 2023-09-02 PROCEDURE — P9612 CATHETERIZE FOR URINE SPEC: HCPCS

## 2023-09-02 PROCEDURE — 84443 ASSAY THYROID STIM HORMONE: CPT | Performed by: EMERGENCY MEDICINE

## 2023-09-02 PROCEDURE — 85025 COMPLETE CBC W/AUTO DIFF WBC: CPT | Performed by: EMERGENCY MEDICINE

## 2023-09-02 RX ORDER — SODIUM ZIRCONIUM CYCLOSILICATE 5 G/5G
5 POWDER, FOR SUSPENSION ORAL ONCE
Qty: 1 PACKET | Refills: 0 | Status: SHIPPED | OUTPATIENT
Start: 2023-09-02 | End: 2023-09-02

## 2023-09-02 RX ORDER — GRANULES FOR ORAL 3 G/1
3 POWDER ORAL ONCE
Status: COMPLETED | OUTPATIENT
Start: 2023-09-02 | End: 2023-09-02

## 2023-09-02 RX ADMIN — SODIUM ZIRCONIUM CYCLOSILICATE 10 G: 10 POWDER, FOR SUSPENSION ORAL at 14:02

## 2023-09-02 RX ADMIN — GRANULES FOR ORAL SOLUTION 3 G: 3 POWDER ORAL at 15:02

## 2023-09-02 NOTE — CASE MANAGEMENT/SOCIAL WORK
MADELINE New RN  in ED contacted me and said pt missed dialysis again and asked about transportation . I read notes from recent admit here and d/w Kendall GARVEY and read his notes as well and communicated with the nurse that pt did receive resources from [facility] a few days ago and pt can pay for pacs or find other transport from family or friends if she can't afford pacs. Pt is current with [facility] and they noted yesterday they are aware of her transport difficulties.

## 2023-09-02 NOTE — CASE MANAGEMENT/SOCIAL WORK
Pallavi ARREDONDO RN in the ED asked if I could set up transport home with [facility] and I called and spoke with Rich and his w/c van is still in the shop. I let ED RN know and she is calling EMS

## 2023-09-02 NOTE — HOME HEALTH
KARLOS/CASE CONFERENCE NOTE            9/1/23 SANDRA VARGAS, RN                  PT IS A 64 YO WHITE FEMALE ADMITTED TO [facility] FOR SERVICES OF SN/PT/OT DUE TO RECENT HOSPITALIZATION FROM 8/26-8-8/28/23 DUE TO PT MISSING 2 APPOINTMENTS FOR DIALYSIS ON THURSDAY, 8/24 & WAS NOT PICKED UP ON SATURDAY, 8/26/23 AND NOTED TO HAVE K+ - 8.8. PT HAS A HX: T2DM, HTN, CKD AND ON DIALYSIS ( TU-TH-SAT), COPD, CAD, PVD, AND PT IS A SMOKER. PT ALSO GOES TO  CARE EVERY WEDNESDAY.              FOCUS OF CARE: HYPERKALEMIA, WOUND CARE TO (R) FOOT - POST GREAT TOE AND 2ND TOE AMPUTATED, MEDICATION EDUCATION. PT/OT - THERAPEUTIC EXERCISES, GTRENGTHENING, TRANSFER TRAINING & ADL'S.

## 2023-09-02 NOTE — ED PROVIDER NOTES
Subjective   History of Present Illness  This is a 63-year-old female who presents for generalized weakness and having missed hemodialysis.  The patient states that she has been having issues getting hemodialysis transportation to show up at her house.  She states that today despite being ready they never showed up.  She was apparently told that if she could not find another way to dialysis by 11 AM that she would have to cancel her appointment.  She did not have an alternative ride.  Additionally the patient states that she has been feeling a sense of generalized weakness recently over the past several days.  She states that she had 1 fall which resulted in pain to her left leg stump and left hand at the base of the third digit.  She denies chest pain or trouble breathing.  She denies syncope or palpitations.  She denies vomiting or diarrhea.      Review of Systems   All other systems reviewed and are negative.    Past Medical History:   Diagnosis Date    Acute blood loss anemia 04/16/2017    Likely due to gastric oozing at this time. - Dr. Duarte (GI) was consulted and has now signed off, will follow up outpatient - pill colonoscopy showed AVMs - continue to monitor    Acute cystitis with hematuria 03/31/2021 1/13: IV Rocephin 1 gm q 24 1/14 : transitioned  to omnicef 300mg. Urine cultures resulted and did not show growth. Omnicef discontinued as patient is asymptomatic    Altered mental status 01/09/2022    - AMS on presentation - initial ABG pH 7.3, CO2 34 - Procal 0.29 - UA negative for acute cysitits -CTA head wnl  - Empiric Zosyn and Vancomycin -Lactate 2.5 on admission  - blood cultures no growth at 24 hours      Anxiety     CAD (coronary artery disease) 04/24/2021    S/P 3 stents 5/1/2021 for [facility] Continue ASA 81mg & Clopidogrel 75mg Continue Atorvastatin 40mg    Carotid artery stenosis     CHF (congestive heart failure)     Chronic obstructive lung disease     CKD (chronic kidney disease) stage 4, GFR  15-29 ml/min     CKD (chronic kidney disease), symptom management only, stage 5 10/05/2020    Results from last 7 days Lab Units 12/15/21 0548 12/14/21 1323 12/14/21 0916 CREATININE mg/dL 3.92* 3.21* 3.32*  Baseline creatinine 2-3 GFR 13-25 GFR 15 Dialysis MWF, sees Dr. Lauren Nephrology consult,, appreciate recommendations Continue Bumex 1mg bid daily Holding Bumex 2mg 4 times a week    Colonic polyp     Coronary arteriosclerosis     Diabetes mellitus     Diabetic neuropathy     Ear pain, right 10/18/2021    - canal trauma due to patient scratching and DMT2 - added cortisporin ear drops    Elevated troponin 10/12/2021    -most likely from CKD -Trending down -Neg chest pain    Generalized abdominal pain 07/01/2022    Could be due to initiation of tube feeds vs dyspepsia vs abdominal cramps related to no PO intake due to intubation vs constipation Continue current laxative regiment  If no bowel movement by this afternoon will consider enema    GERD (gastroesophageal reflux disease)     GI bleed 05/13/2021    - GI will follow up outpatient - Protonix 40mg daily - Avoid medical DVT prophy and use mechanical at this time instead. - Continue to monitor - pill colonoscopy results showed AVMs    History of transfusion     Hypercholesterolemia     Hyperosmolar hyperglycemic state (HHS) 06/25/2022    Serum glucose 605 on admission  Anion gap 16 PH 7.37 Bicarb 27.9 Continue fluids  Insulin drip with HHS protocol  Anion gap closed around 10 AM, received one dose of Levamir subq, will stop insulin drip after 2 hrs      Hypertension     Hypokalemia 05/27/2022    Will replace as needed. Will be cautious in the setting of ESRD to avoid need for emergency dialysis   Monitor Qtc intervals on EKG      Hypomagnesemia 06/27/2021    Monitor and replace    Morbid obesity     Nephrolithiasis     On mechanically assisted ventilation 06/26/2022    Vent management and sedation orders placed.  - [facility] intensivist group consulted for  vent management appreciate recommendations  - plan to extubate today      Peripheral vascular disease     Pleural effusion on right 06/26/2022    CXR on 6/30/22 read as a small upper left pulmonary edema vs early pneumonia.  Last Echo 1/2022 EF 61-65 % Continue to monitor  Procal slightly improved, CRP improved On Linezolid and meropenum       PVD (peripheral vascular disease)     SIRS (systemic inflammatory response syndrome) 01/09/2022    Admission  - WBC 17.78   -   - RR 16 - 1/10: VSS/wnl - CXR - Mild pulm edema - Blood cultures no growth at 24 hours  - Procalcitonin 0.29 - UA : glucose 1000, negative Leucocytes/nitrate - Empiric Zosyn and Vancomcyin     Sleep apnea     Substance abuse     Vitamin D deficiency        Allergies   Allergen Reactions    Adhesive Tape Hives, Other (See Comments) and Rash    Nsaids Hives    Latex Other (See Comments) and Hives    Other Itching     Bandaids, MRSA, SURECLOSE    Wound Dressing Adhesive Hives and Rash       Past Surgical History:   Procedure Laterality Date    AMPUTATION DIGIT Right 2/27/2023    Procedure: Excision of right first metatarsal head, right second toe amputation;  Surgeon: Jean Oliveira DPM;  Location: [facility];  Service: Podiatry;  Laterality: Right;    BELOW KNEE AMPUTATION Left 12/16/2022    Procedure: AMPUTATION BELOW KNEE, LEFT;  Surgeon: Huy White MD;  Location: [facility] OR;  Service: Orthopedics;  Laterality: Left;    CARDIAC CATHETERIZATION N/A 07/14/2020    CARDIAC CATHETERIZATION N/A 04/23/2021    Procedure: Left Heart Cath;  Surgeon: Melba Romo MD;  Location: [facility] CATH INVASIVE LOCATION;  Service: Cardiology;  Laterality: N/A;    CARDIAC CATHETERIZATION N/A 04/30/2021    Procedure: Percutaneous Coronary Intervention;  Surgeon: Russell Voss MD;  Location: [facility] CATH INVASIVE LOCATION;  Service: Cardiovascular;  Laterality: N/A;    CARDIAC CATHETERIZATION N/A 04/30/2021    Procedure: Stent NIKKI coronary;   Surgeon: Russell Voss MD;  Location: [facility] CATH INVASIVE LOCATION;  Service: Cardiovascular;  Laterality: N/A;    CARDIAC CATHETERIZATION Left 11/13/2021    Procedure: Left Heart Cath;  Surgeon: Niall Rios MD;  Location: [facility] CATH INVASIVE LOCATION;  Service: Cardiology;  Laterality: Left;    CAROTID STENT Left     COLONOSCOPY      COLONOSCOPY N/A 05/14/2021    Procedure: COLONOSCOPY;  Surgeon: Mingo Duarte MD;  Location: [facility] ENDOSCOPY;  Service: Gastroenterology;  Laterality: N/A;    CORONARY ARTERY BYPASS GRAFT N/A 2013    CABG X 3    CYSTOSCOPY BLADDER STONE LITHOTRIPSY Bilateral     ENDOSCOPY N/A 04/12/2021    Procedure: ESOPHAGOGASTRODUODENOSCOPY;  Surgeon: Mingo Duarte MD;  Location: [facility] ENDOSCOPY;  Service: Gastroenterology;  Laterality: N/A;    ENDOSCOPY N/A 05/14/2021    Procedure: ESOPHAGOGASTRODUODENOSCOPY;  Surgeon: Mingo Duarte MD;  Location: [facility] ENDOSCOPY;  Service: Gastroenterology;  Laterality: N/A;    ENDOSCOPY N/A 01/28/2022    Procedure: ESOPHAGOGASTRODUODENOSCOPY;  Surgeon: Mingo Duarte MD;  Location: [facility] ENDOSCOPY;  Service: Gastroenterology;  Laterality: N/A;    HYSTERECTOMY      INTERVENTIONAL RADIOLOGY PROCEDURE N/A 10/21/2021    Procedure: tunneled central venous catheter placement;  Surgeon: Donnie Robles MD;  Location: [facility] ANGIO INVASIVE LOCATION;  Service: Interventional Radiology;  Laterality: N/A;    INTERVENTIONAL RADIOLOGY PROCEDURE N/A 01/27/2022    Procedure: tunneled central venous catheter placement;  Surgeon: Donnie Robles MD;  Location: [facility]GIO INVASIVE LOCATION;  Service: Interventional Radiology;  Laterality: N/A;    LAPAROSCOPIC TUBAL LIGATION      TUNNELED VENOUS CATHETER PLACEMENT         Family History   Problem Relation Age of Onset    Heart disease Mother     Lung cancer Mother     Heart disease Father     Heart attack Father     Diabetes Father     Heart disease Half-Sister         Dad's  side    Heart disease Brother     No Known Problems Sister     No Known Problems Sister     No Known Problems Sister     No Known Problems Sister     No Known Problems Sister     Pancreatic cancer Half-Sister         Dad's side    No Known Problems Brother     No Known Problems Brother     Heart attack Half-Brother     Heart attack Half-Brother     No Known Problems Maternal Grandmother     No Known Problems Maternal Grandfather     No Known Problems Paternal Grandmother     No Known Problems Paternal Grandfather        Social History     Socioeconomic History    Marital status: Legally      Spouse name: wesley dumont   Tobacco Use    Smoking status: Some Days     Packs/day: 0.25     Years: 46.00     Pack years: 11.50     Types: Cigarettes     Start date: 2/1/1999    Smokeless tobacco: Never   Vaping Use    Vaping Use: Every day    Substances: Nicotine    Devices: Disposable   Substance and Sexual Activity    Alcohol use: No    Drug use: Not Currently     Types: Marijuana    Sexual activity: Defer           Objective   Physical Exam  Vitals and nursing note reviewed.   Constitutional:       General: She is not in acute distress.     Appearance: She is obese.      Comments: Appears chronically ill   HENT:      Head: Normocephalic and atraumatic.      Nose: Nose normal.   Eyes:      General: No scleral icterus.  Cardiovascular:      Rate and Rhythm: Normal rate and regular rhythm.   Pulmonary:      Effort: Pulmonary effort is normal.      Breath sounds: Normal breath sounds.   Abdominal:      Tenderness: There is no abdominal tenderness.   Musculoskeletal:         General: No signs of injury.      Comments: Tenderness at the base of the left third finger as well as distal left leg stump.   Skin:     General: Skin is warm and dry.   Neurological:      Mental Status: She is alert and oriented to person, place, and time.   Psychiatric:         Behavior: Behavior normal.       Procedures           ED Course  ED  "Course as of 09/02/23 1809   Sat Sep 02, 2023   1231 CBC & Differential(!)  Chronic anemia [JV]   1247 High Sensitivity Troponin T(!)  Single elevated high-sensitivity troponin at similar level to past testing.  Will obtain second troponin. [JV]   1248 Comprehensive Metabolic Panel(!)  Mild hyperkalemia [JV]   1421 Urinalysis With Microscopic If Indicated (No Culture) - Straight Cath(!) [JV]   1421 Urinalysis, Microscopic Only - Straight Cath(!)  Chronically abnormal urinalyses.  Given lacking specific urinary symptoms and given past testing possible colonization.  Given history of ESBL positive cultures recently and lacking sepsis will give dose of fosfomycin. [JV]   1447 High Sensitivity Troponin T 2Hr(!)  Insignificant delta troponin [JV]      ED Course User Index  [JV] Jf Adkins, DO                                           Medical Decision Making  The patient's recent past medical charts for this facility as well as outside facilities via the \"care everywhere\" application of epic reviewed.  The patient was recently discharged from the hospital after an admission with diabetic foot wound and hyperkalemia with missed hemodialysis.    The differential diagnosis includes electrolyte disturbance, renal failure, infection, and anemia, among others.    We will consult case management to see the patient regarding ride issues to hemodialysis.    On final reevaluation the patient is in stable condition.  We discussed discharge instructions and need for follow-up.  We discussed reasons for early return to the emergency department.        Problems Addressed:  Stage 5 chronic kidney disease on chronic dialysis: complicated acute illness or injury  Urinary tract infection without hematuria, site unspecified: complicated acute illness or injury    Amount and/or Complexity of Data Reviewed  Labs: ordered. Decision-making details documented in ED Course.  Radiology: ordered.  ECG/medicine tests: ordered and independent " interpretation performed.     Details: EKG interpretation: Interpreted myself.  Sinus rhythm with first-degree AV block.  Normal QRS and indeterminate axis.  Rate 64.  Normal QTc interval.  ST segments are nonspecific.  Discussion of management or test interpretation with external provider(s): Case discussed with Dr. Lauren of the nephrology service.  He is familiar with the patient.  We discussed the patient's recent admission.  We discussed her difficulty with rides.  We discussed her examination and laboratory evaluation.  He would like the patient to receive a dose of Lokelma today as well as 1 prescribed for tomorrow.  He will call and have it arranged for her to receive hemodialysis on Monday rather than wait for her usual Tuesday Thursday Saturday appointment times.    Risk  OTC drugs.  Prescription drug management.        Final diagnoses:   Stage 5 chronic kidney disease on chronic dialysis   Urinary tract infection without hematuria, site unspecified       ED Disposition  ED Disposition       ED Disposition   Discharge    Condition   Stable    Comment   --               Kiersten Wilson, APRN  [address] [facility] [id number] [phone]    Call in 2 days  To make an appointment to be reevaluated after emergency department visit.    [facility] EMERGENCY DEPARTMENT  [address] [place] [id number] [phone]    As needed if symptoms worsen.  Please return to the emergency department if you have not received hemodialysis by the end of Monday.    Ace Lauren MD  [address] [facility] [address] [phone]    Call in 2 days  Plan on receiving dialysis on Monday to help catch up.         Medication List        New Prescriptions      Lokelma 5 g pack  Generic drug: sodium zirconium cyclosilicate  Take 5 g by mouth 1 (One) Time for 1 dose.               Where to Get Your Medications        These medications were [facility] to [facility]  [facility] [place] [facility] [address] - [phone]  - [phone] FX  [address], [facility] [address]      Phone: [phone]   [facility] g Jf Vallejo,   09/02/23 8140

## 2023-09-02 NOTE — DISCHARGE INSTRUCTIONS
Please use the information provided at your most recent discharge from the hospital to arrange for rides to dialysis.  Free rides may or may not be available.

## 2023-09-03 ENCOUNTER — HOME CARE VISIT (OUTPATIENT)
Dept: HOME HEALTH SERVICES | Facility: CLINIC | Age: 63
End: 2023-09-03
Payer: MEDICARE

## 2023-09-03 VITALS
OXYGEN SATURATION: 96 % | DIASTOLIC BLOOD PRESSURE: 70 MMHG | SYSTOLIC BLOOD PRESSURE: 120 MMHG | TEMPERATURE: 98.1 F | HEART RATE: 88 BPM

## 2023-09-03 PROCEDURE — G0299 HHS/HOSPICE OF RN EA 15 MIN: HCPCS

## 2023-09-06 ENCOUNTER — OUTSIDE FACILITY SERVICE (OUTPATIENT)
Dept: WOUND CARE | Facility: HOSPITAL | Age: 63
End: 2023-09-06
Payer: MEDICARE

## 2023-09-06 ENCOUNTER — OFFICE VISIT (OUTPATIENT)
Dept: WOUND CARE | Facility: HOSPITAL | Age: 63
End: 2023-09-06
Payer: MEDICARE

## 2023-09-06 PROCEDURE — 99213 OFFICE O/P EST LOW 20 MIN: CPT | Performed by: NURSE PRACTITIONER

## 2023-09-06 PROCEDURE — G0463 HOSPITAL OUTPT CLINIC VISIT: HCPCS

## 2023-09-07 ENCOUNTER — READMISSION MANAGEMENT (OUTPATIENT)
Dept: CALL CENTER | Facility: HOSPITAL | Age: 63
End: 2023-09-07
Payer: MEDICARE

## 2023-09-07 ENCOUNTER — HOME CARE VISIT (OUTPATIENT)
Dept: HOME HEALTH SERVICES | Facility: HOME HEALTHCARE | Age: 63
End: 2023-09-07
Payer: MEDICARE

## 2023-09-07 NOTE — OUTREACH NOTE
Medical Week 2 Survey      Flowsheet Row Responses   [facility] patient discharged from? [place]   Does the patient have one of the following disease processes/diagnoses(primary or secondary)? Other   Week 2 attempt successful? No   Unsuccessful attempts Attempt 2            Marielena LIU - Registered Nurse

## 2023-09-08 ENCOUNTER — HOSPITAL ENCOUNTER (OUTPATIENT)
Dept: CT IMAGING | Facility: HOSPITAL | Age: 63
Discharge: HOME | End: 2023-09-08
Admitting: NURSE PRACTITIONER
Payer: MEDICARE

## 2023-09-08 ENCOUNTER — HOME CARE VISIT (OUTPATIENT)
Dept: HOME HEALTH SERVICES | Facility: CLINIC | Age: 63
End: 2023-09-08
Payer: MEDICARE

## 2023-09-08 DIAGNOSIS — I70.211 ATHEROSCLER OF NATIVE ARTERY OF RIGHT LEG WITH INTERMIT CLAUDICATION: ICD-10-CM

## 2023-09-08 DIAGNOSIS — I73.9 PVD (PERIPHERAL VASCULAR DISEASE) WITH CLAUDICATION: ICD-10-CM

## 2023-09-08 PROCEDURE — 75635 CT ANGIO ABDOMINAL ARTERIES: CPT

## 2023-09-08 PROCEDURE — G0152 HHCP-SERV OF OT,EA 15 MIN: HCPCS

## 2023-09-08 PROCEDURE — 25510000001 IOPAMIDOL PER 1 ML: Performed by: NURSE PRACTITIONER

## 2023-09-08 RX ORDER — SODIUM CHLORIDE 9 MG/ML
100 INJECTION, SOLUTION INTRAVENOUS
Status: COMPLETED | OUTPATIENT
Start: 2023-09-08 | End: 2023-09-08

## 2023-09-08 RX ADMIN — SODIUM CHLORIDE 100 ML: 9 INJECTION, SOLUTION INTRAVENOUS at 15:29

## 2023-09-08 RX ADMIN — IOPAMIDOL 120 ML: 755 INJECTION, SOLUTION INTRAVENOUS at 15:28

## 2023-09-08 NOTE — Clinical Note
"Huddle  / Case Conference Note  Medical Necessity and focus of care:The patient recovering from hospital stay for increased potassium after missing dialysis. She presents with decrease UE strength, impaired safety with h/o falls, poor balance for transfers and standing tasks and is dependent on caregiver for ADLs. Patient requires skilled home based occupational therapy services for remediation of deficits of decreased functional use and weakness of B UEs, standing balance, endurance, and safety to maximize her independence with ADLs, transfers, and functional mobility with activities within the home environment.     Caregiver Status: lives with sister   Patient's goal(s): \"get stronger so I can transfer myself without falling\"  GG Discharge Goal:  Medication Issus:   Environmental/ Physical issues: wound on RLE, NWB on LLE   Any additional needs:    Based upon record review and collaboration conference, the recommended frequency for this patient is  SN-----  PT-----  OT--1wk4--  ST-----  HHA---  MSW---  Provider___ADRIAN ERAZO_______ Notified @ ___9/8/23______ and agrees with POC.     Please verify your eval  scores: (Answer the scores  that pertain to your area of focus remove the others)    3  "

## 2023-09-10 ENCOUNTER — HOME CARE VISIT (OUTPATIENT)
Dept: HOME HEALTH SERVICES | Facility: CLINIC | Age: 63
End: 2023-09-10
Payer: MEDICARE

## 2023-09-10 VITALS
HEART RATE: 82 BPM | SYSTOLIC BLOOD PRESSURE: 132 MMHG | RESPIRATION RATE: 18 BRPM | TEMPERATURE: 98 F | OXYGEN SATURATION: 96 % | DIASTOLIC BLOOD PRESSURE: 84 MMHG

## 2023-09-10 PROCEDURE — G0299 HHS/HOSPICE OF RN EA 15 MIN: HCPCS

## 2023-09-11 ENCOUNTER — HOME CARE VISIT (OUTPATIENT)
Dept: HOME HEALTH SERVICES | Facility: CLINIC | Age: 63
End: 2023-09-11
Payer: MEDICARE

## 2023-09-11 VITALS
TEMPERATURE: 97.7 F | RESPIRATION RATE: 19 BRPM | OXYGEN SATURATION: 97 % | SYSTOLIC BLOOD PRESSURE: 126 MMHG | HEART RATE: 66 BPM | DIASTOLIC BLOOD PRESSURE: 82 MMHG

## 2023-09-11 PROCEDURE — G0157 HHC PT ASSISTANT EA 15: HCPCS

## 2023-09-11 NOTE — HOME HEALTH
OCCUPATIONAL THERAPY EVALUATION       REASON FOR REFERRAL: PT IS A 62 YO WHITE FEMALE ADMITTED TO [facility] FOR HOSPITALIZATION FROM 8/26-8-8/28/23 DUE TO PT MISSING 2 APPOINTMENTS FOR DIALYSIS ON THURSDAY, 8/24 & WAS NOT PICKED UP ON SATURDAY, 8/26/23 AND NOTED TO HAVE K+ - 8.8. PT HAS A HX: T2DM, HTN, CKD AND ON DIALYSIS ( [facility]), COPD, CAD, PVD, AND PT IS A SMOKER. PT ALSO GOES TO  CARE EVERY WEDNESDAY.     HOME SOCIAL ENVIRONMENT: Patient lives in a one level apartment with her sister and uses a wc, she has aprosthetic for left, she DOES NOT WEAR IT VERY OFTEN, SMALL DOG IS IN THE HOME      PRIOR LEVEL OF FUNCTION: lived in her apartment with sister providing care 24/7, USED A WC FOR PRIMARY MOBILITY       CLINICAL FINDINGS:   UPPER EXTREMITY ASSESSMENT:     AROM IS WNL, MMS 4-/5  /PINCH:                4-/5                         FINE MOTOR COORDINATION:       MILD IMPAIRMENTS FOR OPENING BOTTLES AND CONTAINERS        UPPER EXTREMITY GROSS MOTOR COORDINATION: NO IMPAIRMENTS  SENSATION: NO IMPAIRMENTS                                                                     FUNCTIONAL ENDURANCE FOR SAFE ACTIVITIES OF DAILY LIVING/INSTRUMENTAL ACTIVITIES OF DAILY LIVING:  THE PATIENT IS UNABLE TO TOLERATE PROLONGED ACTIVITY >10-15 MIN OR STANDING >2-3 MIN Limited endurance noted throughout evaluation requiring frequent rest breaks.          BALANCE:              SITTING BALANCE: FAIR  STANDING BALANCE:   POOR   WEIGHT BEARING STATUS/PRECAUTIONS:   NWB LLE, WOUND ON RLE   ASSISTIVE DEVICES: RW, TUB/ shower with shower curtain and hand held shower with shower chair with back, W/C, HOSPITAL BED, RAILS NEXT TO TUB AND TOILET       MEDICAL NECESSITY: The patient recovering from hospital stay for increased potassium after missing dialysis. She presents with decrease UE strength, impaired safety with h/o falls, poor balance for transfers and standing tasks and is dependent on caregiver for ADLs. Patient  "requires skilled home based occupational therapy services for remediation of deficits of decreased functional use and weakness of B UEs, standing balance, endurance, and safety to maximize her independence with ADLs, transfers, and functional mobility with activities within the home environment.       PATIENT GOAL FOR THIS EPISODE OF CARE: \"GET STRONGER SO I CAN TRANSFER BY MYSELF WITHOUT FALLING\"  REHAB POTENTIAL :  [facility]   DATE OF NEXT APPOINTMENT WITH DOCTOR: DIALYSIS TUES/ THURS/ FRI, WOUND CARE WED 9/13 9:15 / 9/11: MD APPOINTMENTS      ANTICIPATED DISCHARGE PLAN:  TO SELF/CAREGIVER   PATIENT / CAREGIVER AGREE WITH DISCHARGE PLAN: YES   COMMUNICATION / CARE COORDINATION:  Case communication note to admitting RN/ pt with [facility] [place]/# of visits.   WISH TO ADDRESS BATHING WITH OT: MAYBE"

## 2023-09-13 ENCOUNTER — OFFICE VISIT (OUTPATIENT)
Dept: WOUND CARE | Facility: HOSPITAL | Age: 63
End: 2023-09-13
Payer: MEDICARE

## 2023-09-13 ENCOUNTER — OUTSIDE FACILITY SERVICE (OUTPATIENT)
Dept: WOUND CARE | Facility: HOSPITAL | Age: 63
End: 2023-09-13
Payer: MEDICARE

## 2023-09-13 VITALS
HEART RATE: 63 BPM | DIASTOLIC BLOOD PRESSURE: 88 MMHG | RESPIRATION RATE: 18 BRPM | TEMPERATURE: 97.9 F | OXYGEN SATURATION: 97 % | SYSTOLIC BLOOD PRESSURE: 138 MMHG

## 2023-09-13 PROCEDURE — G0463 HOSPITAL OUTPT CLINIC VISIT: HCPCS

## 2023-09-13 PROCEDURE — 99213 OFFICE O/P EST LOW 20 MIN: CPT | Performed by: NURSE PRACTITIONER

## 2023-09-14 ENCOUNTER — READMISSION MANAGEMENT (OUTPATIENT)
Dept: CALL CENTER | Facility: HOSPITAL | Age: 63
End: 2023-09-14
Payer: MEDICARE

## 2023-09-14 NOTE — OUTREACH NOTE
Medical Week 3 Survey      Flowsheet Row Responses   [facility] patient discharged from? [facility]   Does the patient have one of the following disease processes/diagnoses(primary or secondary)? Other   Week 3 attempt successful? No   Unsuccessful attempts Attempt 1            Marielena Chaney Registered Nurse

## 2023-09-15 ENCOUNTER — HOME CARE VISIT (OUTPATIENT)
Dept: HOME HEALTH SERVICES | Facility: HOME HEALTHCARE | Age: 63
End: 2023-09-15
Payer: MEDICARE

## 2023-09-15 ENCOUNTER — OFFICE VISIT (OUTPATIENT)
Dept: CARDIAC SURGERY | Facility: CLINIC | Age: 63
End: 2023-09-15
Payer: MEDICARE

## 2023-09-15 ENCOUNTER — HOME CARE VISIT (OUTPATIENT)
Dept: HOME HEALTH SERVICES | Facility: CLINIC | Age: 63
End: 2023-09-15
Payer: MEDICARE

## 2023-09-15 VITALS
BODY MASS INDEX: 46.56 KG/M2 | DIASTOLIC BLOOD PRESSURE: 72 MMHG | HEIGHT: 62 IN | SYSTOLIC BLOOD PRESSURE: 140 MMHG | OXYGEN SATURATION: 98 % | WEIGHT: 253 LBS | HEART RATE: 69 BPM

## 2023-09-15 DIAGNOSIS — Z89.512 HX OF BKA, LEFT: ICD-10-CM

## 2023-09-15 DIAGNOSIS — N18.6 ESRD (END STAGE RENAL DISEASE) ON DIALYSIS: ICD-10-CM

## 2023-09-15 DIAGNOSIS — Z99.2 ESRD (END STAGE RENAL DISEASE) ON DIALYSIS: ICD-10-CM

## 2023-09-15 DIAGNOSIS — J41.1 MUCOPURULENT CHRONIC BRONCHITIS: ICD-10-CM

## 2023-09-15 DIAGNOSIS — I25.10 CORONARY ARTERY DISEASE INVOLVING NATIVE CORONARY ARTERY OF NATIVE HEART WITHOUT ANGINA PECTORIS: ICD-10-CM

## 2023-09-15 DIAGNOSIS — E78.2 MIXED HYPERLIPIDEMIA: ICD-10-CM

## 2023-09-15 DIAGNOSIS — I70.234 ATHEROSCLEROSIS OF NATIVE ARTERIES OF RIGHT LEG WITH ULCERATION OF HEEL AND MIDFOOT: ICD-10-CM

## 2023-09-15 DIAGNOSIS — I10 PRIMARY HYPERTENSION: ICD-10-CM

## 2023-09-15 DIAGNOSIS — E11.52 TYPE 2 DIABETES MELLITUS WITH DIABETIC PERIPHERAL ANGIOPATHY WITH GANGRENE, UNSPECIFIED WHETHER LONG TERM INSULIN USE: ICD-10-CM

## 2023-09-15 DIAGNOSIS — G47.33 OSA AND COPD OVERLAP SYNDROME: ICD-10-CM

## 2023-09-15 DIAGNOSIS — J44.9 OSA AND COPD OVERLAP SYNDROME: ICD-10-CM

## 2023-09-15 DIAGNOSIS — I73.9 PVD (PERIPHERAL VASCULAR DISEASE): Primary | ICD-10-CM

## 2023-09-15 DIAGNOSIS — I65.23 BILATERAL CAROTID ARTERY STENOSIS: ICD-10-CM

## 2023-09-15 RX ORDER — SODIUM CHLORIDE 9 MG/ML
40 INJECTION, SOLUTION INTRAVENOUS AS NEEDED
OUTPATIENT
Start: 2023-09-15

## 2023-09-15 RX ORDER — SODIUM CHLORIDE 0.9 % (FLUSH) 0.9 %
10 SYRINGE (ML) INJECTION EVERY 12 HOURS SCHEDULED
OUTPATIENT
Start: 2023-09-15

## 2023-09-15 RX ORDER — SODIUM CHLORIDE 9 MG/ML
100 INJECTION, SOLUTION INTRAVENOUS CONTINUOUS
OUTPATIENT
Start: 2023-09-15

## 2023-09-15 RX ORDER — SODIUM CHLORIDE 0.9 % (FLUSH) 0.9 %
10 SYRINGE (ML) INJECTION AS NEEDED
OUTPATIENT
Start: 2023-09-15

## 2023-09-15 NOTE — PROGRESS NOTES
9/15/2023    Yamileth Slater  1960    Chief Complaint:  PVD    HPI:      PCP:  Kiersten Wilson APRN  Cardiology: Dr. Davis  [facility]  Vascular (AVF): Dr. Wade  Podiatry: Dr. Oliveira  Orthopedic Surgery: Dr White    63 y.o. female with HTN(stable, increased risk stroke, rupture), Hyperlipidemia(stable, increased risk cardiovascular events), Diabetes Mellitus(stable, increased risk cardiovascular events), Obesity(uncontrolled, increased risk cardiovascular events) and Chronic Kidney Disease(stable, increased risk renal failure), COPD with chronic, O2 use over the last 3 years, coronary artery disease multiple interventions, peripheral vascular disease(chronic severe progression, increase risk cardiovascular events), multiple distal intervention, poorly controlled diabetes with diabetic foot infections she is status post transmit, delayed wound healing, history of MRSA CRE, on dialysis, poor follow-up.  former smoker.  Quit 1999.   Patient is medically complex, multiple hospitalizations over the past few years.  Multiple hospital admissions related to diabetic foot infections was transferred to [facility] in [facility] for infectious disease evaluation.  She subsequently underwent vascular evaluation and bilateral lower extremity arteriogram with intervention to both distal SFA and popliteal, she also went left transmetatarsal amputation at that time.  Surgical site dehiscence of left transmit site has had prolonged wound care with poor healing eventually required L BK which has healed performed in 12/2022.  We previously saw patient last year and determined no further revascularization options of LLE.  She now has gangrenous digit of the right toe which will require intervention.  Reports today with SUNDEEP.   Right foot numb which is chronic.     2014 arteriogram: PTA right SFA stent (Dr. Robles)   9/2022 bilateral lower extremity arteriogram:  PTA proximal SFA, PTA and stent stenosis right SFA, PTA/stent right popliteal.  Left SFA PTA stent Olevia 6 x 40 mm 6 x 120 mm due to dissection, in stent stenosis post intervention [facility]  9/2022 left transmetatarsal amputation [facility]  12/2022 LEFT BKA Dr. White  12/2022 [facility]: Right 0.55 biphasic.  Left 0.5 triphasic femoral, biphasic popliteal, monophasic distally.  Limited evaluation of waveforms secondary to heart rhythm and body habitus  8/2023 [facility]DEEP: RIGHT Monophasic, absent DP    2014 attempted left carotid endarterectomy, procedure aborted unable to find carotid (Dr. Aguayo)  5/2014 LEFT Transfemoral carotid stent (Dr. Robles)    1/2022 tunnel cath placement  3/2023 RIGHT BVT (Dr. Wade)    2013 CABG x3  11/2021 [facility]: Distal left main ostial circumflex stenosis, patent LIMA to LAD, 100% occluded RCA with occluded SVG to RCA  11/2021 PTA stent distal left main [facility]    The following portions of the patient's history were reviewed and updated as appropriate: allergies, current medications, past family history, past medical history, past social history, past surgical history and problem list.  Recent images independently reviewed.  Available laboratory values reviewed.    PMH:  Past Medical History:   Diagnosis Date    Acute blood loss anemia 04/16/2017    Likely due to gastric oozing at this time. - Dr. Duarte (GI) was consulted and has now signed off, will follow up outpatient - pill colonoscopy showed AVMs - continue to monitor    Acute cystitis with hematuria 03/31/2021 1/13: IV Rocephin 1 gm q 24 1/14 : transitioned  to omnicef 300mg. Urine cultures resulted and did not show growth. Omnicef discontinued as patient is asymptomatic    Altered mental status 01/09/2022    - AMS on presentation - initial ABG pH 7.3, CO2 34 - Procal 0.29 - UA negative for acute cysitits -CTA head wnl  - Empiric Zosyn and Vancomycin -Lactate 2.5 on admission  - blood cultures no growth at 24 hours       Anxiety     CAD (coronary artery disease) 04/24/2021    S/P 3 stents 5/1/2021 for [facility] Continue ASA 81mg & Clopidogrel 75mg Continue Atorvastatin 40mg    Carotid artery stenosis     CHF (congestive heart failure)     Chronic obstructive lung disease     CKD (chronic kidney disease) stage 4, GFR 15-29 ml/min     CKD (chronic kidney disease), symptom management only, stage 5 10/05/2020    Results from last 7 days Lab Units 12/15/21 0548 12/14/21 1323 12/14/21 0916 CREATININE mg/dL 3.92* 3.21* 3.32*  Baseline creatinine 2-3 GFR 13-25 GFR 15 Dialysis MWF, sees Dr. Lauren Nephrology consult,, appreciate recommendations Continue Bumex 1mg bid daily Holding Bumex 2mg 4 times a week    Colonic polyp     Coronary arteriosclerosis     Diabetes mellitus     Diabetic neuropathy     Ear pain, right 10/18/2021    - canal trauma due to patient scratching and DMT2 - added cortisporin ear drops    Elevated troponin 10/12/2021    -most likely from CKD -Trending down -Neg chest pain    Generalized abdominal pain 07/01/2022    Could be due to initiation of tube feeds vs dyspepsia vs abdominal cramps related to no PO intake due to intubation vs constipation Continue current laxative regiment  If no bowel movement by this afternoon will consider enema    GERD (gastroesophageal reflux disease)     GI bleed 05/13/2021    - GI will follow up outpatient - Protonix 40mg daily - Avoid medical DVT prophy and use mechanical at this time instead. - Continue to monitor - pill colonoscopy results showed AVMs    History of transfusion     Hypercholesterolemia     Hyperosmolar hyperglycemic state (HHS) 06/25/2022    Serum glucose 605 on admission  Anion gap 16 PH 7.37 Bicarb 27.9 Continue fluids  Insulin drip with [facility] protocol  Anion gap closed around 10 AM, received one dose of Levamir subq, will stop insulin drip after 2 hrs      Hypertension     Hypokalemia 05/27/2022    Will replace as needed. Will be cautious in the setting of ESRD to avoid  need for emergency dialysis   Monitor Qtc intervals on EKG      Hypomagnesemia 06/27/2021    Monitor and replace    Morbid obesity     Nephrolithiasis     On mechanically assisted ventilation 06/26/2022    Vent management and sedation orders placed.  - [facility] intensivist group consulted for vent management appreciate recommendations  - plan to extubate today      Peripheral vascular disease     Pleural effusion on right 06/26/2022    CXR on 6/30/22 read as a small upper left pulmonary edema vs early pneumonia.  Last Echo 1/2022 EF 61-65 % Continue to monitor  Procal slightly improved, CRP improved On Linezolid and meropenum       PVD (peripheral vascular disease)     SIRS (systemic inflammatory response syndrome) 01/09/2022    Admission  - WBC 17.78   -   - RR 16 - 1/10: VSS/wnl - CXR - Mild pulm edema - Blood cultures no growth at 24 hours  - Procalcitonin 0.29 - UA : glucose 1000, negative Leucocytes/nitrate - Empiric Zosyn and Vancomcyin     Sleep apnea     Substance abuse     Vitamin D deficiency      Past Surgical History:   Procedure Laterality Date    AMPUTATION DIGIT Right 2/27/2023    Procedure: Excision of right first metatarsal head, right second toe amputation;  Surgeon: Jean Oliveira DPM;  Location: [facility] OR;  Service: Podiatry;  Laterality: Right;    BELOW KNEE AMPUTATION Left 12/16/2022    Procedure: AMPUTATION BELOW KNEE, LEFT;  Surgeon: Huy White MD;  Location: [facility] OR;  Service: Orthopedics;  Laterality: Left;    CARDIAC CATHETERIZATION N/A 07/14/2020    CARDIAC CATHETERIZATION N/A 04/23/2021    Procedure: Left Heart Cath;  Surgeon: Melba Romo MD;  Location: [facility] CATH INVASIVE LOCATION;  Service: Cardiology;  Laterality: N/A;    CARDIAC CATHETERIZATION N/A 04/30/2021    Procedure: Percutaneous Coronary Intervention;  Surgeon: Russell Voss MD;  [facility]: [facility] CATH INVASIVE LOCATION;  Service: Cardiovascular;  Laterality: N/A;    CARDIAC  CATHETERIZATION N/A 04/30/2021    Procedure: Stent NIKKI coronary;  Surgeon: Russell Voss MD;  Location: [facility] CATH INVASIVE LOCATION;  Service: Cardiovascular;  Laterality: N/A;    CARDIAC CATHETERIZATION Left 11/13/2021    Procedure: Left Heart Cath;  Surgeon: Niall Rios MD;  Location: [facility] CATH INVASIVE LOCATION;  Service: Cardiology;  Laterality: Left;    CAROTID STENT Left     COLONOSCOPY      COLONOSCOPY N/A 05/14/2021    Procedure: COLONOSCOPY;  Surgeon: Mingo Duarte MD;  Location: [facility] ENDOSCOPY;  Service: Gastroenterology;  Laterality: N/A;    CORONARY ARTERY BYPASS GRAFT N/A 2013    CABG X 3    CYSTOSCOPY BLADDER STONE LITHOTRIPSY Bilateral     ENDOSCOPY N/A 04/12/2021    Procedure: ESOPHAGOGASTRODUODENOSCOPY;  Surgeon: Mingo Duarte MD;  Location: [facility] ENDOSCOPY;  Service: Gastroenterology;  Laterality: N/A;    ENDOSCOPY N/A 05/14/2021    Procedure: ESOPHAGOGASTRODUODENOSCOPY;  Surgeon: Mingo Duarte MD;  Location: [facility] ENDOSCOPY;  Service: Gastroenterology;  Laterality: N/A;    ENDOSCOPY N/A 01/28/2022    Procedure: ESOPHAGOGASTRODUODENOSCOPY;  Surgeon: Mingo Duarte MD;  Location: [facility] ENDOSCOPY;  Service: Gastroenterology;  Laterality: N/A;    HYSTERECTOMY      INTERVENTIONAL RADIOLOGY PROCEDURE N/A 10/21/2021    Procedure: tunneled central venous catheter placement;  Surgeon: Donnie Robles MD;  Location: [facility] ANGIO INVASIVE LOCATION;  Service: Interventional Radiology;  Laterality: N/A;    INTERVENTIONAL RADIOLOGY PROCEDURE N/A 01/27/2022    Procedure: tunneled central venous catheter placement;  Surgeon: Donnie Robles MD;  Location: [facility] ANGIO INVASIVE LOCATION;  Service: Interventional Radiology;  Laterality: N/A;    LAPAROSCOPIC TUBAL LIGATION      TUNNELED VENOUS CATHETER PLACEMENT       Family History   Problem Relation Age of Onset    Heart disease Mother     Lung cancer Mother     Heart disease Father     Heart attack Father      Diabetes Father     Heart disease Half-Sister         Dad's side    Heart disease Brother     No Known Problems Sister     No Known Problems Sister     No Known Problems Sister     No Known Problems Sister     No Known Problems Sister     Pancreatic cancer Half-Sister         Dad's side    No Known Problems Brother     No Known Problems Brother     Heart attack Half-Brother     Heart attack Half-Brother     No Known Problems Maternal Grandmother     No Known Problems Maternal Grandfather     No Known Problems Paternal Grandmother     No Known Problems Paternal Grandfather      Social History     Tobacco Use    Smoking status: Some Days     Packs/day: 0.25     Years: 46.00     Pack years: 11.50     Types: Cigarettes     Start date: 2/1/1999    Smokeless tobacco: Never   Vaping Use    Vaping Use: Every day    Substances: Nicotine    Devices: Disposable   Substance Use Topics    Alcohol use: No    Drug use: Not Currently     Types: Marijuana       ALLERGIES:  Allergies   Allergen Reactions    Adhesive Tape Hives, Other (See Comments) and Rash    Nsaids Hives    Latex Other (See Comments) and Hives    Other Itching     Bandaids, MRSA, SURECLOSE    Wound Dressing Adhesive Hives and Rash         MEDICATIONS:    Current Outpatient Medications:     albuterol (PROVENTIL) (2.5 MG/3ML) 0.083% nebulizer solution, Inhale the contents of 1 vial by nebulization Every 4 (Four) Hours As Needed for Wheezing., Disp: 75 mL, Rfl: 3    albuterol sulfate  (90 Base) MCG/ACT inhaler, Inhale 2 puffs Every 4 (Four) Hours As Needed for Wheezing., Disp: 18 g, Rfl: 1    atorvastatin (LIPITOR) 40 MG tablet, Take 1 tablet by mouth Daily. Indications: High Amount of Fats in the Blood, Disp: , Rfl:     Blood Glucose Monitoring Suppl ([employer] Blood Glucose Meter) w/Device kit, 1 each 3 (Three) Times a Day., Disp: 1 kit, Rfl: 3    carvedilol (COREG) 12.5 MG tablet, Take 1 tablet by mouth 2 (Two) Times a Day With Meals. Indications: High Blood  "Pressure Disorder, Disp: 180 tablet, Rfl: 6    Cetirizine HCl 10 MG capsule, Take 1 capsule by mouth Every Night. Indications: Hayfever, Disp: , Rfl:     clopidogrel (PLAVIX) 75 MG tablet, Take 1 tablet by mouth Daily. (Patient taking differently: Take 1 tablet by mouth Daily.), Disp: 30 tablet, Rfl: 5    cyclobenzaprine (FLEXERIL) 5 MG tablet, Take 1 tablet by mouth 2 (Two) Times a Day. Indications: Muscle Spasm, Disp: , Rfl:     Diclofenac Sodium (VOLTAREN) 1 % gel gel, Apply 4 g topically to the appropriate area as directed 4 (Four) Times a Day As Needed (Ankle pain)., Disp: 350 g, Rfl: 2    docusate sodium (COLACE) 100 MG capsule, Take 1 capsule by mouth 2 (Two) Times a Day. Indications: Constipation, Disp: , Rfl:     DULoxetine (CYMBALTA) 20 MG capsule, Take 1 capsule by mouth Daily. Indications: Disease of the Peripheral Nerves, Disp: , Rfl:     Easy Touch Insulin Syringe 30G X 5/16\" 0.5 ML misc, USE AS DIRECTED WITH LEVEMIR, Disp: , Rfl:     EASY TOUCH PEN NEEDLES 31G X 8 MM misc, , Disp: , Rfl:     furosemide (LASIX) 20 MG tablet, Take 1 tablet by mouth Daily. Indications: Edema, Disp: , Rfl:     hydrOXYzine (ATARAX) 25 MG tablet, Take 1 tablet by mouth Every 8 (Eight) Hours As Needed for Itching. Indications: Itching, Disp: , Rfl:     insulin aspart (novoLOG FLEXPEN) 100 UNIT/ML solution pen-injector sc pen, Inject 14 Units under the skin into the appropriate area as directed 4 (Four) Times a Day Before Meals & at Bedtime. 14 units with meals AND sliding scale as follows.. 150-199 take 4 units  200-249 take 8 units  250-299 take 12 units  300-349 take 16 units  350-400 take 20 units  greater than 400, call physician,  Indications: T2DM, Disp: , Rfl:     insulin detemir (LEVEMIR) 100 UNIT/ML injection, Inject 24 Units under the skin into the appropriate area as directed 2 (Two) Times a Day. pt takes in the am and pm  Indications: T2DM, Disp: , Rfl:     Insulin Lispro, 1 Unit Dial, (HUMALOG) 100 UNIT/ML " solution pen-injector, Inject 14-35 Units under the skin into the appropriate area as directed 3 (Three) Times a Day With Meals. Takes 14 units with meals plus sliding scale Sliding scale:  150-199 take 4 units 200-249 take 8 units 250-299 take 12 units 300-349 take 16 units 350-400 take 20 units greater than 400, call physician, Disp: , Rfl:     Insulin Pen Needle (NovoFine) 30G X 8 MM misc, As directed 4 times daily, Disp: 100 each, Rfl: 11    Insulin Pen Needle 31G X 8 MM misc, Use to inject insulin 4 (Four) Times a Day as directed., Disp: 120 each, Rfl: 12    ipratropium-albuterol (DUO-NEB) 0.5-2.5 mg/3 ml nebulizer, Take 3 mL by nebulization 4 (Four) Times a Day As Needed. Indications: Chronic Obstructive Lung Disease, Disp: , Rfl:     memantine (NAMENDA) 5 MG tablet, Take 1 tablet by mouth 2 (Two) Times a Day. Indications: Cognitive Dysfunction, Disp: , Rfl:     nitroglycerin (NITROSTAT) 0.4 MG SL tablet, Place 1 tablet under the tongue Every 5 (Five) Minutes As Needed for Chest Pain (x 3 doses). Indications: Acute Angina Pectoris, Disp: , Rfl:     O2 (OXYGEN), Inhale 2 L/min Continuous. Only uses when patient takes Trilogy  Indications: SOA, Disp: , Rfl:     ondansetron (ZOFRAN) 4 MG tablet, Take 1 tablet by mouth Every 6 (Six) Hours As Needed for Nausea or Vomiting., Disp: , Rfl:     oxyCODONE (ROXICODONE) 10 MG tablet, Take 1 tablet by mouth 3 (Three) Times a Day. Indications: Acute Pain, Chronic Pain, Disp: , Rfl:     pantoprazole (PROTONIX) 40 MG EC tablet, Take 1 tablet by mouth Every Night., Disp: 90 tablet, Rfl: 0    promethazine (PHENERGAN) 25 MG tablet, Take 1 tablet by mouth Every 6 (Six) Hours As Needed for Nausea or Vomiting. Indications: Nausea and Vomiting, Disp: , Rfl:     sevelamer (RENVELA) 800 MG tablet, Take 1 tablet by mouth 3 (Three) Times a Day With Meals. Indications: High Amount of Phosphate in the Blood, Disp: , Rfl:     Vitamin D, Ergocalciferol, 44923 units capsule, Take 1 capsule  "by mouth 1 (One) Time Per Week. Take on Wednesday  Indications: Kidney Failure Syndrome, Disp: , Rfl:     gabapentin (NEURONTIN) 300 MG capsule, Take 1 capsule by mouth Daily for 3 days., Disp: 3 capsule, Rfl: 0    Review of Systems   Review of Systems   Constitutional: Positive for malaise/fatigue. Negative for weight loss.   Cardiovascular:  Positive for dyspnea on exertion. Negative for chest pain and claudication.   Respiratory:  Positive for cough and shortness of breath.    Skin:  Positive for poor wound healing. Negative for color change.   Musculoskeletal:  Positive for arthritis, back pain and joint pain.   Neurological:  Positive for numbness and paresthesias. Negative for dizziness and weakness.     Physical Exam   Vitals:    09/15/23 0806   BP: 140/72   BP Location: Left arm   Pulse: 69   SpO2: 98%   Weight: 115 kg (253 lb)   Height: 157.5 cm (62\")     Body surface area is 2.11 meters squared.  Body mass index is 46.27 kg/m².  Physical Exam  Constitutional:       General: She is not in acute distress.     Appearance: She is not ill-appearing.   HENT:      Right Ear: Hearing normal.      Left Ear: Hearing normal.      Nose: No nasal deformity.      Mouth/Throat:      Dentition: Normal dentition. Does not have dentures.   Cardiovascular:      Rate and Rhythm: Normal rate and regular rhythm.      Pulses:           Carotid pulses are 2+ on the right side and 2+ on the left side.       Radial pulses are 2+ on the right side and 2+ on the left side.        Dorsalis pedis pulses are detected w/ Doppler on the right side. Left dorsalis pedis pulse not accessible.        Posterior tibial pulses are detected w/ Doppler on the right side. Left posterior tibial pulse not accessible.      Heart sounds: No murmur heard.     Comments: LEFT BKA  RIGHT nonhealing wound 1,2 toe amputation site  Pulmonary:      Effort: Pulmonary effort is normal.      Breath sounds: Normal breath sounds.   Abdominal:      General: There is " no distension.      Palpations: Abdomen is soft. There is no mass.      Tenderness: There is no abdominal tenderness.   Musculoskeletal:         General: No deformity.      Comments: Gait normal.    Skin:     General: Skin is warm and dry.      Coloration: Skin is not pale.      Findings: No erythema.      Comments: No venous staining   Neurological:      Mental Status: She is alert and oriented to person, place, and time.   Psychiatric:         Speech: Speech normal.         Behavior: Behavior is cooperative.         Thought Content: Thought content normal.         Judgment: Judgment normal.       BUN   Date Value Ref Range Status   09/02/2023 38 (H) 8 - 23 mg/dL Final   08/25/2021 63 (H) 8 - 26 mg/dL Final     Creatinine   Date Value Ref Range Status   09/02/2023 4.72 (H) 0.57 - 1.00 mg/dL Final   08/25/2021 2.84 (H) 0.57 - 1.11 mg/dL Final     eGFR Non  Amer   Date Value Ref Range Status   02/26/2022 15 (L) >60 mL/min/1.73 Final     eGFR   Amer   Date Value Ref Range Status   01/29/2022   Final     Comment:     <15 Indicative of kidney failure.       ASSESSMENT:  Diagnoses and all orders for this visit:    1. PVD (peripheral vascular disease) (Primary)  -     Case Request; Standing  -     sodium chloride 0.9 % flush 10 mL  -     sodium chloride 0.9 % flush 10 mL  -     sodium chloride 0.9 % infusion 40 mL  -     sodium chloride 0.9 % infusion  -     Case Request    2. Atherosclerosis of native arteries of right leg with ulceration of heel and midfoot  -     Case Request; Standing  -     Case Request    3. Mixed hyperlipidemia    4. Primary hypertension    5. Mucopurulent chronic bronchitis    6. Coronary artery disease involving native coronary artery of native heart without angina pectoris    7. Bilateral carotid artery stenosis    8. MIKE and COPD overlap syndrome    9. Type 2 diabetes mellitus with diabetic peripheral angiopathy with gangrene, unspecified whether long term insulin use    10. Hx of  BKA, left    11. ESRD (end stage renal disease) on dialysis    Other orders  -     Provide NPO Instructions to Patient; Future  -     Chlorhexidine Skin Prep; Future  -     Obtain Informed Consent; Standing  -     Chlorhexidine Skin Prep; Standing  -     Clip Bilateral Groins; Standing  -     Insert Peripheral IV; Standing  -     Saline Lock & Maintain IV Access; Standing    PLAN:  Detailed discussion with Yamileth Slater regarding situation, options and plans.  Severe ischemic tissue loss, nonhealing wound RIGHT foot.  Noninvasive studies indicate severe peripheral vascular disease.  Requires additional urgent evaluation with arteriography to evaluate options for improving blood flow to feet, healing wounds and avoiding limb loss.    Risks, including but not limited to:  pain, infection, bleeding, renal failure (dialysis), nerve or blood vessel injury, need for emergent open operation to restore blood flow.  Benefits: relief of symptoms, reduction in risk of limb loss, improved wound healing.  Options:  Medical therapy, alternative imaging discussed.  Understands and wishes to proceed with plan.    Abdominal Aortogram, bilateral lower extremity runoff, possible balloon angioplasty, thrombolysis, atherectomy or stent.  Local/IV sedation.  SDS.  9/21/2023    Return after above studies complete  Obesity Class  3. Increased risk cardiovascular events, sleep and breathing disorders, joint issues, type 2 diabetes mellitus. Options for weight management, heart healthy diet, exercise programs, and associated health risks of obesity discussed.  Recommended regular physical activity, progressive walking program.  Continue current medications as directed.  Smoking cessation advised and assistance options offered including behavioral counseling ([facility]essation [facility]), Nicotine replacement therapy (patches or gum), pharmacologic therapy (Chantix, Wellbutrin). patient understands that continued use of  tobacco products increases risk of heart disease, stroke, cancer; counseling for 3-5min.    Thank you for the opportunity to participate in this patient's care.    Copy to primary care provider.

## 2023-09-15 NOTE — H&P (VIEW-ONLY)
9/15/2023    Yamileth Slater  1960    Chief Complaint:  PVD    HPI:      PCP:  Kiersten Wilson APRN  Cardiology: Dr. Davis  [facility]  Vascular (AVF): Dr. Wade  Podiatry: Dr. Oliveira  Orthopedic Surgery: Dr White    63 y.o. female with HTN(stable, increased risk stroke, rupture), Hyperlipidemia(stable, increased risk cardiovascular events), Diabetes Mellitus(stable, increased risk cardiovascular events), Obesity(uncontrolled, increased risk cardiovascular events) and Chronic Kidney Disease(stable, increased risk renal failure), COPD with chronic, O2 use over the last 3 years, coronary artery disease multiple interventions, peripheral vascular disease(chronic severe progression, increase risk cardiovascular events), multiple distal intervention, poorly controlled diabetes with diabetic foot infections she is status post transmit, delayed wound healing, history of MRSA CRE, on dialysis, poor follow-up.  former smoker.  Quit 1999.   Patient is medically complex, multiple hospitalizations over the past few years.  Multiple hospital admissions related to diabetic foot infections was transferred to [facility] in [facility] for infectious disease evaluation.  She subsequently underwent vascular evaluation and bilateral lower extremity arteriogram with intervention to both distal SFA and popliteal, she also went left transmetatarsal amputation at that time.  Surgical site dehiscence of left transmit site has had prolonged wound care with poor healing eventually required L BK which has healed performed in 12/2022.  We previously saw patient last year and determined no further revascularization options of LLE.  She now has gangrenous digit of the right toe which will require intervention.  Reports today with SUNDEEP.   Right foot numb which is chronic.     2014 arteriogram: PTA right SFA stent (Dr. Robles)   9/2022 bilateral lower extremity arteriogram:  PTA proximal SFA, PTA and stent stenosis right SFA, PTA/stent right popliteal.  Left SFA PTA stent Olevia 6 x 40 mm 6 x 120 mm due to dissection, in stent stenosis post intervention [facility]  9/2022 left transmetatarsal amputation [facility]  12/2022 LEFT BKA Dr. White  12/2022 [facility]: Right 0.55 biphasic.  Left 0.5 triphasic femoral, biphasic popliteal, monophasic distally.  Limited evaluation of waveforms secondary to heart rhythm and body habitus  8/2023 [facility]EP: RIGHT Monophasic, absent DP    2014 attempted left carotid endarterectomy, procedure aborted unable to find carotid (Dr. Aguayo)  5/2014 LEFT Transfemoral carotid stent (Dr. Robles)    1/2022 tunnel cath placement  3/2023 RIGHT BVT (Dr. Wade)    2013 CABG x3  11/2021 [facility]: Distal left main ostial circumflex stenosis, patent LIMA to LAD, 100% occluded RCA with occluded SVG to RCA  11/2021 PTA stent distal left main [facility]    The following portions of the patient's history were reviewed and updated as appropriate: allergies, current medications, past family history, past medical history, past social history, past surgical history and problem list.  Recent images independently reviewed.  Available laboratory values reviewed.    PMH:  Past Medical History:   Diagnosis Date    Acute blood loss anemia 04/16/2017    Likely due to gastric oozing at this time. - Dr. Duarte (GI) was consulted and has now signed off, will follow up outpatient - pill colonoscopy showed AVMs - continue to monitor    Acute cystitis with hematuria 03/31/2021 1/13: IV Rocephin 1 gm q 24 1/14 : transitioned  to omnicef 300mg. Urine cultures resulted and did not show growth. Omnicef discontinued as patient is asymptomatic    Altered mental status 01/09/2022    - AMS on presentation - initial ABG pH 7.3, CO2 34 - Procal 0.29 - UA negative for acute cysitits -CTA head wnl  - Empiric Zosyn and Vancomycin -Lactate 2.5 on admission  - blood cultures no growth at 24 hours       Anxiety     CAD (coronary artery disease) 04/24/2021    S/P 3 stents 5/1/2021 for [facility] Continue ASA 81mg & Clopidogrel 75mg Continue Atorvastatin 40mg    Carotid artery stenosis     CHF (congestive heart failure)     Chronic obstructive lung disease     CKD (chronic kidney disease) stage 4, GFR 15-29 ml/min     CKD (chronic kidney disease), symptom management only, stage 5 10/05/2020    Results from last 7 days Lab Units 12/15/21 0548 12/14/21 1323 12/14/21 0916 CREATININE mg/dL 3.92* 3.21* 3.32*  Baseline creatinine 2-3 GFR 13-25 GFR 15 Dialysis MWF, sees Dr. Lauren Nephrology consult,, appreciate recommendations Continue Bumex 1mg bid daily Holding Bumex 2mg 4 times a week    Colonic polyp     Coronary arteriosclerosis     Diabetes mellitus     Diabetic neuropathy     Ear pain, right 10/18/2021    - canal trauma due to patient scratching and DMT2 - added cortisporin ear drops    Elevated troponin 10/12/2021    -most likely from CKD -Trending down -Neg chest pain    Generalized abdominal pain 07/01/2022    Could be due to initiation of tube feeds vs dyspepsia vs abdominal cramps related to no PO intake due to intubation vs constipation Continue current laxative regiment  If no bowel movement by this afternoon will consider enema    GERD (gastroesophageal reflux disease)     GI bleed 05/13/2021    - GI will follow up outpatient - Protonix 40mg daily - Avoid medical DVT prophy and use mechanical at this time instead. - Continue to monitor - pill colonoscopy results showed AVMs    History of transfusion     Hypercholesterolemia     Hyperosmolar hyperglycemic state (HHS) 06/25/2022    Serum glucose 605 on admission  Anion gap 16 PH 7.37 Bicarb 27.9 Continue fluids  Insulin drip with [facility] protocol  Anion gap closed around 10 AM, received one dose of Levamir subq, will stop insulin drip after 2 hrs      Hypertension     Hypokalemia 05/27/2022    Will replace as needed. Will be cautious in the setting of ESRD to avoid  need for emergency dialysis   Monitor Qtc intervals on EKG      Hypomagnesemia 06/27/2021    Monitor and replace    Morbid obesity     Nephrolithiasis     On mechanically assisted ventilation 06/26/2022    Vent management and sedation orders placed.  - Pending sale to [facility] intensivist group consulted for vent management appreciate recommendations  - plan to extubate today      Peripheral vascular disease     Pleural effusion on right 06/26/2022    CXR on 6/30/22 read as a small upper left pulmonary edema vs early pneumonia.  Last Echo 1/2022 EF 61-65 % Continue to monitor  Procal slightly improved, CRP improved On Linezolid and meropenum       PVD (peripheral vascular disease)     SIRS (systemic inflammatory response syndrome) 01/09/2022    Admission  - WBC 17.78   -   - RR 16 - 1/10: VSS/wnl - CXR - Mild pulm edema - Blood cultures no growth at 24 hours  - Procalcitonin 0.29 - UA : glucose 1000, negative Leucocytes/nitrate - Empiric Zosyn and Vancomcyin     Sleep apnea     Substance abuse     Vitamin D deficiency      Past Surgical History:   Procedure Laterality Date    AMPUTATION DIGIT Right 2/27/2023    Procedure: Excision of right first metatarsal head, right second toe amputation;  Surgeon: Jean Oliveira DPM;  Location: [facility] OR;  Service: Podiatry;  Laterality: Right;    BELOW KNEE AMPUTATION Left 12/16/2022    Procedure: AMPUTATION BELOW KNEE, LEFT;  Surgeon: Huy White MD;  Location: [facility] OR;  Service: Orthopedics;  Laterality: Left;    CARDIAC CATHETERIZATION N/A 07/14/2020    CARDIAC CATHETERIZATION N/A 04/23/2021    Procedure: Left Heart Cath;  Surgeon: Melba Romo MD;  Location: [facility] CATH INVASIVE LOCATION;  Service: Cardiology;  Laterality: N/A;    CARDIAC CATHETERIZATION N/A 04/30/2021    Procedure: Percutaneous Coronary Intervention;  Surgeon: Russell Voss MD;  Location: [facility] CATH INVASIVE LOCATION;  Service: Cardiovascular;  Laterality: N/A;    CARDIAC  CATHETERIZATION N/A 04/30/2021    Procedure: Stent NIKKI coronary;  Surgeon: Russell Voss MD;  Location: [facility] CATH INVASIVE LOCATION;  Service: Cardiovascular;  Laterality: N/A;    CARDIAC CATHETERIZATION Left 11/13/2021    Procedure: Left Heart Cath;  Surgeon: Niall Rios MD;  Location: [facility] CATH INVASIVE LOCATION;  Service: Cardiology;  Laterality: Left;    CAROTID STENT Left     COLONOSCOPY      COLONOSCOPY N/A 05/14/2021    Procedure: COLONOSCOPY;  Surgeon: Mingo Duarte MD;  Location: [facility] ENDOSCOPY;  Service: Gastroenterology;  Laterality: N/A;    CORONARY ARTERY BYPASS GRAFT N/A 2013    CABG X 3    CYSTOSCOPY BLADDER STONE LITHOTRIPSY Bilateral     ENDOSCOPY N/A 04/12/2021    Procedure: ESOPHAGOGASTRODUODENOSCOPY;  Surgeon: Mingo Duarte MD;  Location: [facility] ENDOSCOPY;  Service: Gastroenterology;  Laterality: N/A;    ENDOSCOPY N/A 05/14/2021    Procedure: ESOPHAGOGASTRODUODENOSCOPY;  Surgeon: Mingo Duarte MD;  Location: [facility] ENDOSCOPY;  Service: Gastroenterology;  Laterality: N/A;    ENDOSCOPY N/A 01/28/2022    Procedure: ESOPHAGOGASTRODUODENOSCOPY;  Surgeon: Mingo Duarte MD;  Location: [facility] ENDOSCOPY;  Service: Gastroenterology;  Laterality: N/A;    HYSTERECTOMY      INTERVENTIONAL RADIOLOGY PROCEDURE N/A 10/21/2021    Procedure: tunneled central venous catheter placement;  Surgeon: Donnie Robles MD;  Location: [facility] ANGIO INVASIVE LOCATION;  Service: Interventional Radiology;  Laterality: N/A;    INTERVENTIONAL RADIOLOGY PROCEDURE N/A 01/27/2022    Procedure: tunneled central venous catheter placement;  Surgeon: Donnie Robles MD;  Location: [facility] ANGIO INVASIVE LOCATION;  Service: Interventional Radiology;  Laterality: N/A;    LAPAROSCOPIC TUBAL LIGATION      TUNNELED VENOUS CATHETER PLACEMENT       Family History   Problem Relation Age of Onset    Heart disease Mother     Lung cancer Mother     Heart disease Father     Heart attack Father      Diabetes Father     Heart disease Half-Sister         Dad's side    Heart disease Brother     No Known Problems Sister     No Known Problems Sister     No Known Problems Sister     No Known Problems Sister     No Known Problems Sister     Pancreatic cancer Half-Sister         Dad's side    No Known Problems Brother     No Known Problems Brother     Heart attack Half-Brother     Heart attack Half-Brother     No Known Problems Maternal Grandmother     No Known Problems Maternal Grandfather     No Known Problems Paternal Grandmother     No Known Problems Paternal Grandfather      Social History     Tobacco Use    Smoking status: Some Days     Packs/day: 0.25     Years: 46.00     Pack years: 11.50     Types: Cigarettes     Start date: 2/1/1999    Smokeless tobacco: Never   Vaping Use    Vaping Use: Every day    Substances: Nicotine    Devices: Disposable   Substance Use Topics    Alcohol use: No    Drug use: Not Currently     Types: Marijuana       ALLERGIES:  Allergies   Allergen Reactions    Adhesive Tape Hives, Other (See Comments) and Rash    Nsaids Hives    Latex Other (See Comments) and Hives    Other Itching     Bandaids, MRSA, SURECLOSE    Wound Dressing Adhesive Hives and Rash         MEDICATIONS:    Current Outpatient Medications:     albuterol (PROVENTIL) (2.5 MG/3ML) 0.083% nebulizer solution, Inhale the contents of 1 vial by nebulization Every 4 (Four) Hours As Needed for Wheezing., Disp: 75 mL, Rfl: 3    albuterol sulfate  (90 Base) MCG/ACT inhaler, Inhale 2 puffs Every 4 (Four) Hours As Needed for Wheezing., Disp: 18 g, Rfl: 1    atorvastatin (LIPITOR) 40 MG tablet, Take 1 tablet by mouth Daily. Indications: High Amount of Fats in the Blood, Disp: , Rfl:     Blood Glucose Monitoring Suppl ([employer] Blood Glucose Meter) w/Device kit, 1 each 3 (Three) Times a Day., Disp: 1 kit, Rfl: 3    carvedilol (COREG) 12.5 MG tablet, Take 1 tablet by mouth 2 (Two) Times a Day With Meals. Indications: High Blood  "Pressure Disorder, Disp: 180 tablet, Rfl: 6    Cetirizine HCl 10 MG capsule, Take 1 capsule by mouth Every Night. Indications: Hayfever, Disp: , Rfl:     clopidogrel (PLAVIX) 75 MG tablet, Take 1 tablet by mouth Daily. (Patient taking differently: Take 1 tablet by mouth Daily.), Disp: 30 tablet, Rfl: 5    cyclobenzaprine (FLEXERIL) 5 MG tablet, Take 1 tablet by mouth 2 (Two) Times a Day. Indications: Muscle Spasm, Disp: , Rfl:     Diclofenac Sodium (VOLTAREN) 1 % gel gel, Apply 4 g topically to the appropriate area as directed 4 (Four) Times a Day As Needed (Ankle pain)., Disp: 350 g, Rfl: 2    docusate sodium (COLACE) 100 MG capsule, Take 1 capsule by mouth 2 (Two) Times a Day. Indications: Constipation, Disp: , Rfl:     DULoxetine (CYMBALTA) 20 MG capsule, Take 1 capsule by mouth Daily. Indications: Disease of the Peripheral Nerves, Disp: , Rfl:     Easy Touch Insulin Syringe 30G X 5/16\" 0.5 ML misc, USE AS DIRECTED WITH LEVEMIR, Disp: , Rfl:     EASY TOUCH PEN NEEDLES 31G X 8 MM misc, , Disp: , Rfl:     furosemide (LASIX) 20 MG tablet, Take 1 tablet by mouth Daily. Indications: Edema, Disp: , Rfl:     hydrOXYzine (ATARAX) 25 MG tablet, Take 1 tablet by mouth Every 8 (Eight) Hours As Needed for Itching. Indications: Itching, Disp: , Rfl:     insulin aspart (novoLOG FLEXPEN) 100 UNIT/ML solution pen-injector sc pen, Inject 14 Units under the skin into the appropriate area as directed 4 (Four) Times a Day Before Meals & at Bedtime. 14 units with meals AND sliding scale as follows.. 150-199 take 4 units  200-249 take 8 units  250-299 take 12 units  300-349 take 16 units  350-400 take 20 units  greater than 400, call physician,  Indications: T2DM, Disp: , Rfl:     insulin detemir (LEVEMIR) 100 UNIT/ML injection, Inject 24 Units under the skin into the appropriate area as directed 2 (Two) Times a Day. pt takes in the am and pm  Indications: T2DM, Disp: , Rfl:     Insulin Lispro, 1 Unit Dial, (HUMALOG) 100 UNIT/ML " solution pen-injector, Inject 14-35 Units under the skin into the appropriate area as directed 3 (Three) Times a Day With Meals. Takes 14 units with meals plus sliding scale Sliding scale:  150-199 take 4 units 200-249 take 8 units 250-299 take 12 units 300-349 take 16 units 350-400 take 20 units greater than 400, call physician, Disp: , Rfl:     Insulin Pen Needle (NovoFine) 30G X 8 MM misc, As directed 4 times daily, Disp: 100 each, Rfl: 11    Insulin Pen Needle 31G X 8 MM misc, Use to inject insulin 4 (Four) Times a Day as directed., Disp: 120 each, Rfl: 12    ipratropium-albuterol (DUO-NEB) 0.5-2.5 mg/3 ml nebulizer, Take 3 mL by nebulization 4 (Four) Times a Day As Needed. Indications: Chronic Obstructive Lung Disease, Disp: , Rfl:     memantine (NAMENDA) 5 MG tablet, Take 1 tablet by mouth 2 (Two) Times a Day. Indications: Cognitive Dysfunction, Disp: , Rfl:     nitroglycerin (NITROSTAT) 0.4 MG SL tablet, Place 1 tablet under the tongue Every 5 (Five) Minutes As Needed for Chest Pain (x 3 doses). Indications: Acute Angina Pectoris, Disp: , Rfl:     O2 (OXYGEN), Inhale 2 L/min Continuous. Only uses when patient takes Trilogy  Indications: SOA, Disp: , Rfl:     ondansetron (ZOFRAN) 4 MG tablet, Take 1 tablet by mouth Every 6 (Six) Hours As Needed for Nausea or Vomiting., Disp: , Rfl:     oxyCODONE (ROXICODONE) 10 MG tablet, Take 1 tablet by mouth 3 (Three) Times a Day. Indications: Acute Pain, Chronic Pain, Disp: , Rfl:     pantoprazole (PROTONIX) 40 MG EC tablet, Take 1 tablet by mouth Every Night., Disp: 90 tablet, Rfl: 0    promethazine (PHENERGAN) 25 MG tablet, Take 1 tablet by mouth Every 6 (Six) Hours As Needed for Nausea or Vomiting. Indications: Nausea and Vomiting, Disp: , Rfl:     sevelamer (RENVELA) 800 MG tablet, Take 1 tablet by mouth 3 (Three) Times a Day With Meals. Indications: High Amount of Phosphate in the Blood, Disp: , Rfl:     Vitamin D, Ergocalciferol, 01459 units capsule, Take 1 capsule  "by mouth 1 (One) Time Per Week. Take on Wednesday  Indications: Kidney Failure Syndrome, Disp: , Rfl:     gabapentin (NEURONTIN) 300 MG capsule, Take 1 capsule by mouth Daily for 3 days., Disp: 3 capsule, Rfl: 0    Review of Systems   Review of Systems   Constitutional: Positive for malaise/fatigue. Negative for weight loss.   Cardiovascular:  Positive for dyspnea on exertion. Negative for chest pain and claudication.   Respiratory:  Positive for cough and shortness of breath.    Skin:  Positive for poor wound healing. Negative for color change.   Musculoskeletal:  Positive for arthritis, back pain and joint pain.   Neurological:  Positive for numbness and paresthesias. Negative for dizziness and weakness.     Physical Exam   Vitals:    09/15/23 0806   BP: 140/72   BP Location: Left arm   Pulse: 69   SpO2: 98%   Weight: 115 kg (253 lb)   Height: 157.5 cm (62\")     Body surface area is 2.11 meters squared.  Body mass index is 46.27 kg/m².  Physical Exam  Constitutional:       General: She is not in acute distress.     Appearance: She is not ill-appearing.   HENT:      Right Ear: Hearing normal.      Left Ear: Hearing normal.      Nose: No nasal deformity.      Mouth/Throat:      Dentition: Normal dentition. Does not have dentures.   Cardiovascular:      Rate and Rhythm: Normal rate and regular rhythm.      Pulses:           Carotid pulses are 2+ on the right side and 2+ on the left side.       Radial pulses are 2+ on the right side and 2+ on the left side.        Dorsalis pedis pulses are detected w/ Doppler on the right side. Left dorsalis pedis pulse not accessible.        Posterior tibial pulses are detected w/ Doppler on the right side. Left posterior tibial pulse not accessible.      Heart sounds: No murmur heard.     Comments: LEFT BKA  RIGHT nonhealing wound 1,2 toe amputation site  Pulmonary:      Effort: Pulmonary effort is normal.      Breath sounds: Normal breath sounds.   Abdominal:      General: There is " no distension.      Palpations: Abdomen is soft. There is no mass.      Tenderness: There is no abdominal tenderness.   Musculoskeletal:         General: No deformity.      Comments: Gait normal.    Skin:     General: Skin is warm and dry.      Coloration: Skin is not pale.      Findings: No erythema.      Comments: No venous staining   Neurological:      Mental Status: She is alert and oriented to person, place, and time.   Psychiatric:         Speech: Speech normal.         Behavior: Behavior is cooperative.         Thought Content: Thought content normal.         Judgment: Judgment normal.       BUN   Date Value Ref Range Status   09/02/2023 38 (H) 8 - 23 mg/dL Final   08/25/2021 63 (H) 8 - 26 mg/dL Final     Creatinine   Date Value Ref Range Status   09/02/2023 4.72 (H) 0.57 - 1.00 mg/dL Final   08/25/2021 2.84 (H) 0.57 - 1.11 mg/dL Final     eGFR Non  Amer   Date Value Ref Range Status   02/26/2022 15 (L) >60 mL/min/1.73 Final     eGFR   Amer   Date Value Ref Range Status   01/29/2022   Final     Comment:     <15 Indicative of kidney failure.       ASSESSMENT:  Diagnoses and all orders for this visit:    1. PVD (peripheral vascular disease) (Primary)  -     Case Request; Standing  -     sodium chloride 0.9 % flush 10 mL  -     sodium chloride 0.9 % flush 10 mL  -     sodium chloride 0.9 % infusion 40 mL  -     sodium chloride 0.9 % infusion  -     Case Request    2. Atherosclerosis of native arteries of right leg with ulceration of heel and midfoot  -     Case Request; Standing  -     Case Request    3. Mixed hyperlipidemia    4. Primary hypertension    5. Mucopurulent chronic bronchitis    6. Coronary artery disease involving native coronary artery of native heart without angina pectoris    7. Bilateral carotid artery stenosis    8. MIKE and COPD overlap syndrome    9. Type 2 diabetes mellitus with diabetic peripheral angiopathy with gangrene, unspecified whether long term insulin use    10. Hx of  BKA, left    11. ESRD (end stage renal disease) on dialysis    Other orders  -     Provide NPO Instructions to Patient; Future  -     Chlorhexidine Skin Prep; Future  -     Obtain Informed Consent; Standing  -     Chlorhexidine Skin Prep; Standing  -     Clip Bilateral Groins; Standing  -     Insert Peripheral IV; Standing  -     Saline Lock & Maintain IV Access; Standing    PLAN:  Detailed discussion with Yamileth Slater regarding situation, options and plans.  Severe ischemic tissue loss, nonhealing wound RIGHT foot.  Noninvasive studies indicate severe peripheral vascular disease.  Requires additional urgent evaluation with arteriography to evaluate options for improving blood flow to feet, healing wounds and avoiding limb loss.    Risks, including but not limited to:  pain, infection, bleeding, renal failure (dialysis), nerve or blood vessel injury, need for emergent open operation to restore blood flow.  Benefits: relief of symptoms, reduction in risk of limb loss, improved wound healing.  Options:  Medical therapy, alternative imaging discussed.  Understands and wishes to proceed with plan.    Abdominal Aortogram, bilateral lower extremity runoff, possible balloon angioplasty, thrombolysis, atherectomy or stent.  Local/IV sedation.  SDS.  9/21/2023    Return after above studies complete  Obesity Class  3. Increased risk cardiovascular events, sleep and breathing disorders, joint issues, type 2 diabetes mellitus. Options for weight management, heart healthy diet, exercise programs, and associated health risks of obesity discussed.  Recommended regular physical activity, progressive walking program.  Continue current medications as directed.  Smoking cessation advised and assistance options offered including behavioral counseling ([facility] Cessation [facility]), Nicotine replacement therapy (patches or gum), pharmacologic therapy (Chantix, Wellbutrin). patient understands that continued use of  tobacco products increases risk of heart disease, stroke, cancer; counseling for 3-5min.    Thank you for the opportunity to participate in this patient's care.    Copy to primary care provider.

## 2023-09-15 NOTE — LETTER
September 15, 2023     BRIDGETT Sanders  [address] [facility] [address]    Patient: Yamileth Slater   YOB: 1960   Date of Visit: 9/15/2023     Dear BRIDGETT Sanders:       Thank you for referring Yamileth Slater to me for evaluation. Below are the relevant portions of my assessment and plan of care.    If you have questions, please do not hesitate to call me. I look forward to following Yamileth along with you.         Sincerely,        Donnie Robles MD        CC: MD Margaret Baig, Donnie Parson MD  09/15/23 1027  Sign when Signing Visit  9/15/2023    Yamileth Slater  1960    Chief Complaint:  PVD    HPI:      PCP:  Kiersten Wilson APRN  Cardiology: Dr. Davis  Nephrology:  Dr. Jacobo, [facility]  Vascular (AVF): Dr. Wade  Podiatry: Dr. Oliveira  Orthopedic Surgery: Dr White    63 y.o. female with HTN(stable, increased risk stroke, rupture), Hyperlipidemia(stable, increased risk cardiovascular events), Diabetes Mellitus(stable, increased risk cardiovascular events), Obesity(uncontrolled, increased risk cardiovascular events) and Chronic Kidney Disease(stable, increased risk renal failure), COPD with chronic, O2 use over the last 3 years, coronary artery disease multiple interventions, peripheral vascular disease(chronic severe progression, increase risk cardiovascular events), multiple distal intervention, poorly controlled diabetes with diabetic foot infections she is status post transmit, delayed wound healing, history of MRSA CRE, on dialysis, poor follow-up.  former smoker.  Quit 1999.   Patient is medically complex, multiple hospitalizations over the past few years.  Multiple hospital admissions related to diabetic foot infections was transferred to [facility] in [facility] for infectious disease evaluation.  She subsequently underwent vascular evaluation and bilateral lower extremity arteriogram with  intervention to both distal SFA and popliteal, she also went left transmetatarsal amputation at that time.  Surgical site dehiscence of left transmit site has had prolonged wound care with poor healing eventually required L BK which has healed performed in 12/2022.  We previously saw patient last year and determined no further revascularization options of LLE.  She now has gangrenous digit of the right toe which will require intervention.  Reports today with SUNDEEP.   Right foot numb which is chronic.     2014 arteriogram: PTA right SFA stent (Dr. Robles)   9/2022 bilateral lower extremity arteriogram: PTA proximal SFA, PTA and stent stenosis right SFA, PTA/stent right popliteal.  Left SFA PTA stent Olevia 6 x 40 mm 6 x 120 mm due to dissection, in stent stenosis post intervention [facility]  9/2022 left transmetatarsal amputation [facility]  12/2022 LEFT BKA Dr. White  12/2022 [facility]DEEP: Right 0.55 biphasic.  Left 0.5 triphasic femoral, biphasic popliteal, monophasic distally.  Limited evaluation of waveforms secondary to heart rhythm and body habitus  8/2023 SUNDEEP: RIGHT Monophasic, absent DP    2014 attempted left carotid endarterectomy, procedure aborted unable to find carotid (Dr. Aguayo)  5/2014 LEFT Transfemoral carotid stent (Dr. Robles)    1/2022 tunnel cath placement  3/2023 RIGHT BVT (Dr. Wade)    2013 CABG x3  11/2021 LHC: Distal left main ostial circumflex stenosis, patent LIMA to LAD, 100% occluded RCA with occluded SVG to RCA  11/2021 PTA stent distal left main [facility]    The following portions of the patient's history were reviewed and updated as appropriate: allergies, current medications, past family history, past medical history, past social history, past surgical history and problem list.  Recent images independently reviewed.  Available laboratory values reviewed.    PMH:  Past Medical History:   Diagnosis Date   • Acute blood loss anemia 04/16/2017    Likely due to gastric oozing  at this time. - Dr. Duarte (GI) was consulted and has now signed off, will follow up outpatient - pill colonoscopy showed AVMs - continue to monitor   • Acute cystitis with hematuria 03/31/2021 1/13: IV Rocephin 1 gm q 24 1/14 : transitioned  to omnicef 300mg. Urine cultures resulted and did not show growth. Omnicef discontinued as patient is asymptomatic   • Altered mental status 01/09/2022    - AMS on presentation - initial ABG pH 7.3, CO2 34 - Procal 0.29 - UA negative for acute cysitits -CTA head wnl  - Empiric Zosyn and Vancomycin -Lactate 2.5 on admission  - blood cultures no growth at 24 hours     • Anxiety    • CAD (coronary artery disease) 04/24/2021    S/P 3 stents 5/1/2021 for [facility] Continue ASA 81mg & Clopidogrel 75mg Continue Atorvastatin 40mg   • Carotid artery stenosis    • CHF (congestive heart failure)    • Chronic obstructive lung disease    • CKD (chronic kidney disease) stage 4, GFR 15-29 ml/min    • CKD (chronic kidney disease), symptom management only, stage 5 10/05/2020    Results from last 7 days Lab Units 12/15/21 0548 12/14/21 1323 12/14/21 0916 CREATININE mg/dL 3.92* 3.21* 3.32*  Baseline creatinine 2-3 GFR 13-25 GFR 15 Dialysis MWF, sees Dr. Lauren Nephrology consult,, appreciate recommendations Continue Bumex 1mg bid daily Holding Bumex 2mg 4 times a week   • Colonic polyp    • Coronary arteriosclerosis    • Diabetes mellitus    • Diabetic neuropathy    • Ear pain, right 10/18/2021    - canal trauma due to patient scratching and DMT2 - added cortisporin ear drops   • Elevated troponin 10/12/2021    -most likely from CKD -Trending down -Neg chest pain   • Generalized abdominal pain 07/01/2022    Could be due to initiation of tube feeds vs dyspepsia vs abdominal cramps related to no PO intake due to intubation vs constipation Continue current laxative regiment  If no bowel movement by this afternoon will consider enema   • GERD (gastroesophageal reflux disease)    • GI bleed 05/13/2021     - GI will follow up outpatient - Protonix 40mg daily - Avoid medical DVT prophy and use mechanical at this time instead. - Continue to monitor - pill colonoscopy results showed AVMs   • History of transfusion    • Hypercholesterolemia    • Hyperosmolar hyperglycemic state (HHS) 06/25/2022    Serum glucose 605 on admission  Anion gap 16 PH 7.37 Bicarb 27.9 Continue fluids  Insulin drip with HHS protocol  Anion gap closed around 10 AM, received one dose of Levamir subq, will stop insulin drip after 2 hrs     • Hypertension    • Hypokalemia 05/27/2022    Will replace as needed. Will be cautious in the setting of ESRD to avoid need for emergency dialysis   Monitor Qtc intervals on EKG     • Hypomagnesemia 06/27/2021    Monitor and replace   • Morbid obesity    • Nephrolithiasis    • On mechanically assisted ventilation 06/26/2022    Vent management and sedation orders placed.  - [facility] intensivist group consulted for vent management appreciate recommendations  - plan to extubate today     • Peripheral vascular disease    • Pleural effusion on right 06/26/2022    CXR on 6/30/22 read as a small upper left pulmonary edema vs early pneumonia.  Last Echo 1/2022 EF 61-65 % Continue to monitor  Procal slightly improved, CRP improved On Linezolid and meropenum      • PVD (peripheral vascular disease)    • SIRS (systemic inflammatory response syndrome) 01/09/2022    Admission  - WBC 17.78   -   - RR 16 - 1/10: VSS/wnl - CXR - Mild pulm edema - Blood cultures no growth at 24 hours  - Procalcitonin 0.29 - UA : glucose 1000, negative Leucocytes/nitrate - Empiric Zosyn and Vancomcyin    • Sleep apnea    • Substance abuse    • Vitamin D deficiency      Past Surgical History:   Procedure Laterality Date   • AMPUTATION DIGIT Right 2/27/2023    Procedure: Excision of right first metatarsal head, right second toe amputation;  Surgeon: Jean Oliveira DPM;  Location: [facility];  Service: Podiatry;  Laterality: Right;   •  BELOW KNEE AMPUTATION Left 12/16/2022    Procedure: AMPUTATION BELOW KNEE, LEFT;  Surgeon: Huy White MD;  Location: [facility] OR;  Service: Orthopedics;  Laterality: Left;   • CARDIAC CATHETERIZATION N/A 07/14/2020   • CARDIAC CATHETERIZATION N/A 04/23/2021    Procedure: Left Heart Cath;  Surgeon: Melba Romo MD;  Location: [facility] CATH INVASIVE LOCATION;  Service: Cardiology;  Laterality: N/A;   • CARDIAC CATHETERIZATION N/A 04/30/2021    Procedure: Percutaneous Coronary Intervention;  Surgeon: Russell Voss MD;  Location: [facility] CATH INVASIVE LOCATION;  Service: Cardiovascular;  Laterality: N/A;   • CARDIAC CATHETERIZATION N/A 04/30/2021    Procedure: Stent NIKKI coronary;  Surgeon: Russell Voss MD;  Location: [facility] CATH INVASIVE LOCATION;  Service: Cardiovascular;  Laterality: N/A;   • CARDIAC CATHETERIZATION Left 11/13/2021    Procedure: Left Heart Cath;  Surgeon: Niall Rios MD;  Location: [facility] CATH INVASIVE LOCATION;  Service: Cardiology;  Laterality: Left;   • CAROTID STENT Left    • COLONOSCOPY     • COLONOSCOPY N/A 05/14/2021    Procedure: COLONOSCOPY;  Surgeon: Mingo Duarte MD;  Location: [facility] ENDOSCOPY;  Service: Gastroenterology;  Laterality: N/A;   • CORONARY ARTERY BYPASS GRAFT N/A 2013    CABG X 3   • CYSTOSCOPY BLADDER STONE LITHOTRIPSY Bilateral    • ENDOSCOPY N/A 04/12/2021    Procedure: ESOPHAGOGASTRODUODENOSCOPY;  Surgeon: Mingo Duarte MD;  Location: [facility] ENDOSCOPY;  Service: Gastroenterology;  Laterality: N/A;   • ENDOSCOPY N/A 05/14/2021    Procedure: ESOPHAGOGASTRODUODENOSCOPY;  Surgeon: Mingo Duarte MD;  Location: [facility] ENDOSCOPY;  Service: Gastroenterology;  Laterality: N/A;   • ENDOSCOPY N/A 01/28/2022    Procedure: ESOPHAGOGASTRODUODENOSCOPY;  Surgeon: Mingo Duarte MD;  Location: [facility] ENDOSCOPY;  Service: Gastroenterology;  Laterality: N/A;   • HYSTERECTOMY     • INTERVENTIONAL RADIOLOGY PROCEDURE N/A 10/21/2021     Procedure: tunneled central venous catheter placement;  Surgeon: Donnie Robles MD;  Location: [facility] ANGIO INVASIVE LOCATION;  Service: Interventional Radiology;  Laterality: N/A;   • INTERVENTIONAL RADIOLOGY PROCEDURE N/A 01/27/2022    Procedure: tunneled central venous catheter placement;  Surgeon: Donnie Robles MD;  Location: [facility] ANGIO INVASIVE LOCATION;  Service: Interventional Radiology;  Laterality: N/A;   • LAPAROSCOPIC TUBAL LIGATION     • TUNNELED VENOUS CATHETER PLACEMENT       Family History   Problem Relation Age of Onset   • Heart disease Mother    • Lung cancer Mother    • Heart disease Father    • Heart attack Father    • Diabetes Father    • Heart disease Half-Sister         Dad's side   • Heart disease Brother    • No Known Problems Sister    • No Known Problems Sister    • No Known Problems Sister    • No Known Problems Sister    • No Known Problems Sister    • Pancreatic cancer Half-Sister         Dad's side   • No Known Problems Brother    • No Known Problems Brother    • Heart attack Half-Brother    • Heart attack Half-Brother    • No Known Problems Maternal Grandmother    • No Known Problems Maternal Grandfather    • No Known Problems Paternal Grandmother    • No Known Problems Paternal Grandfather      Social History     Tobacco Use   • Smoking status: Some Days     Packs/day: 0.25     Years: 46.00     Pack years: 11.50     Types: Cigarettes     Start date: 2/1/1999   • Smokeless tobacco: Never   Vaping Use   • Vaping Use: Every day   • Substances: Nicotine   • Devices: Disposable   Substance Use Topics   • Alcohol use: No   • Drug use: Not Currently     Types: Marijuana       ALLERGIES:  Allergies   Allergen Reactions   • Adhesive Tape Hives, Other (See Comments) and Rash   • Nsaids Hives   • Latex Other (See Comments) and Hives   • Other Itching     Bandaids, MRSA, SURECLOSE   • Wound Dressing Adhesive Hives and Rash         MEDICATIONS:    Current Outpatient  "Medications:   •  albuterol (PROVENTIL) (2.5 MG/3ML) 0.083% nebulizer solution, Inhale the contents of 1 vial by nebulization Every 4 (Four) Hours As Needed for Wheezing., Disp: 75 mL, Rfl: 3  •  albuterol sulfate  (90 Base) MCG/ACT inhaler, Inhale 2 puffs Every 4 (Four) Hours As Needed for Wheezing., Disp: 18 g, Rfl: 1  •  atorvastatin (LIPITOR) 40 MG tablet, Take 1 tablet by mouth Daily. Indications: High Amount of Fats in the Blood, Disp: , Rfl:   •  Blood Glucose Monitoring Suppl ([employer] Blood Glucose Meter) w/Device kit, 1 each 3 (Three) Times a Day., Disp: 1 kit, Rfl: 3  •  carvedilol (COREG) 12.5 MG tablet, Take 1 tablet by mouth 2 (Two) Times a Day With Meals. Indications: High Blood Pressure Disorder, Disp: 180 tablet, Rfl: 6  •  Cetirizine HCl 10 MG capsule, Take 1 capsule by mouth Every Night. Indications: Hayfever, Disp: , Rfl:   •  clopidogrel (PLAVIX) 75 MG tablet, Take 1 tablet by mouth Daily. (Patient taking differently: Take 1 tablet by mouth Daily.), Disp: 30 tablet, Rfl: 5  •  cyclobenzaprine (FLEXERIL) 5 MG tablet, Take 1 tablet by mouth 2 (Two) Times a Day. Indications: Muscle Spasm, Disp: , Rfl:   •  Diclofenac Sodium (VOLTAREN) 1 % gel gel, Apply 4 g topically to the appropriate area as directed 4 (Four) Times a Day As Needed (Ankle pain)., Disp: 350 g, Rfl: 2  •  docusate sodium (COLACE) 100 MG capsule, Take 1 capsule by mouth 2 (Two) Times a Day. Indications: Constipation, Disp: , Rfl:   •  DULoxetine (CYMBALTA) 20 MG capsule, Take 1 capsule by mouth Daily. Indications: Disease of the Peripheral Nerves, Disp: , Rfl:   •  Easy Touch Insulin Syringe 30G X 5/16\" 0.5 ML misc, USE AS DIRECTED WITH LEVEMIR, Disp: , Rfl:   •  EASY TOUCH PEN NEEDLES 31G X 8 MM misc, , Disp: , Rfl:   •  furosemide (LASIX) 20 MG tablet, Take 1 tablet by mouth Daily. Indications: Edema, Disp: , Rfl:   •  hydrOXYzine (ATARAX) 25 MG tablet, Take 1 tablet by mouth Every 8 (Eight) Hours As Needed for Itching. " Indications: Itching, Disp: , Rfl:   •  insulin aspart (novoLOG FLEXPEN) 100 UNIT/ML solution pen-injector sc pen, Inject 14 Units under the skin into the appropriate area as directed 4 (Four) Times a Day Before Meals & at Bedtime. 14 units with meals AND sliding scale as follows.. 150-199 take 4 units  200-249 take 8 units  250-299 take 12 units  300-349 take 16 units  350-400 take 20 units  greater than 400, call physician,  Indications: T2DM, Disp: , Rfl:   •  insulin detemir (LEVEMIR) 100 UNIT/ML injection, Inject 24 Units under the skin into the appropriate area as directed 2 (Two) Times a Day. pt takes in the am and pm  Indications: T2DM, Disp: , Rfl:   •  Insulin Lispro, 1 Unit Dial, (HUMALOG) 100 UNIT/ML solution pen-injector, Inject 14-35 Units under the skin into the appropriate area as directed 3 (Three) Times a Day With Meals. Takes 14 units with meals plus sliding scale Sliding scale:  150-199 take 4 units 200-249 take 8 units 250-299 take 12 units 300-349 take 16 units 350-400 take 20 units greater than 400, call physician, Disp: , Rfl:   •  Insulin Pen Needle (NovoFine) 30G X 8 MM misc, As directed 4 times daily, Disp: 100 each, Rfl: 11  •  Insulin Pen Needle 31G X 8 MM misc, Use to inject insulin 4 (Four) Times a Day as directed., Disp: 120 each, Rfl: 12  •  ipratropium-albuterol (DUO-NEB) 0.5-2.5 mg/3 ml nebulizer, Take 3 mL by nebulization 4 (Four) Times a Day As Needed. Indications: Chronic Obstructive Lung Disease, Disp: , Rfl:   •  memantine (NAMENDA) 5 MG tablet, Take 1 tablet by mouth 2 (Two) Times a Day. Indications: Cognitive Dysfunction, Disp: , Rfl:   •  nitroglycerin (NITROSTAT) 0.4 MG SL tablet, Place 1 tablet under the tongue Every 5 (Five) Minutes As Needed for Chest Pain (x 3 doses). Indications: Acute Angina Pectoris, Disp: , Rfl:   •  O2 (OXYGEN), Inhale 2 L/min Continuous. Only uses when patient takes Trilogy  Indications: SOA, Disp: , Rfl:   •  ondansetron (ZOFRAN) 4 MG tablet,  "Take 1 tablet by mouth Every 6 (Six) Hours As Needed for Nausea or Vomiting., Disp: , Rfl:   •  oxyCODONE (ROXICODONE) 10 MG tablet, Take 1 tablet by mouth 3 (Three) Times a Day. Indications: Acute Pain, Chronic Pain, Disp: , Rfl:   •  pantoprazole (PROTONIX) 40 MG EC tablet, Take 1 tablet by mouth Every Night., Disp: 90 tablet, Rfl: 0  •  promethazine (PHENERGAN) 25 MG tablet, Take 1 tablet by mouth Every 6 (Six) Hours As Needed for Nausea or Vomiting. Indications: Nausea and Vomiting, Disp: , Rfl:   •  sevelamer (RENVELA) 800 MG tablet, Take 1 tablet by mouth 3 (Three) Times a Day With Meals. Indications: High Amount of Phosphate in the Blood, Disp: , Rfl:   •  Vitamin D, Ergocalciferol, 10958 units capsule, Take 1 capsule by mouth 1 (One) Time Per Week. Take on Wednesday  Indications: Kidney Failure Syndrome, Disp: , Rfl:   •  gabapentin (NEURONTIN) 300 MG capsule, Take 1 capsule by mouth Daily for 3 days., Disp: 3 capsule, Rfl: 0    Review of Systems   Review of Systems   Constitutional: Positive for malaise/fatigue. Negative for weight loss.   Cardiovascular:  Positive for dyspnea on exertion. Negative for chest pain and claudication.   Respiratory:  Positive for cough and shortness of breath.    Skin:  Positive for poor wound healing. Negative for color change.   Musculoskeletal:  Positive for arthritis, back pain and joint pain.   Neurological:  Positive for numbness and paresthesias. Negative for dizziness and weakness.     Physical Exam   Vitals:    09/15/23 0806   BP: 140/72   BP Location: Left arm   Pulse: 69   SpO2: 98%   Weight: 115 kg (253 lb)   Height: 157.5 cm (62\")     Body surface area is 2.11 meters squared.  Body mass index is 46.27 kg/m².  Physical Exam  Constitutional:       General: She is not in acute distress.     Appearance: She is not ill-appearing.   HENT:      Right Ear: Hearing normal.      Left Ear: Hearing normal.      Nose: No nasal deformity.      Mouth/Throat:      Dentition: " Normal dentition. Does not have dentures.   Cardiovascular:      Rate and Rhythm: Normal rate and regular rhythm.      Pulses:           Carotid pulses are 2+ on the right side and 2+ on the left side.       Radial pulses are 2+ on the right side and 2+ on the left side.        Dorsalis pedis pulses are detected w/ Doppler on the right side. Left dorsalis pedis pulse not accessible.        Posterior tibial pulses are detected w/ Doppler on the right side. Left posterior tibial pulse not accessible.      Heart sounds: No murmur heard.     Comments: LEFT BKA  RIGHT nonhealing wound 1,2 toe amputation site  Pulmonary:      Effort: Pulmonary effort is normal.      Breath sounds: Normal breath sounds.   Abdominal:      General: There is no distension.      Palpations: Abdomen is soft. There is no mass.      Tenderness: There is no abdominal tenderness.   Musculoskeletal:         General: No deformity.      Comments: Gait normal.    Skin:     General: Skin is warm and dry.      Coloration: Skin is not pale.      Findings: No erythema.      Comments: No venous staining   Neurological:      Mental Status: She is alert and oriented to person, place, and time.   Psychiatric:         Speech: Speech normal.         Behavior: Behavior is cooperative.         Thought Content: Thought content normal.         Judgment: Judgment normal.       BUN   Date Value Ref Range Status   09/02/2023 38 (H) 8 - 23 mg/dL Final   08/25/2021 63 (H) 8 - 26 mg/dL Final     Creatinine   Date Value Ref Range Status   09/02/2023 4.72 (H) 0.57 - 1.00 mg/dL Final   08/25/2021 2.84 (H) 0.57 - 1.11 mg/dL Final     eGFR Non  Amer   Date Value Ref Range Status   02/26/2022 15 (L) >60 mL/min/1.73 Final     eGFR   Amer   Date Value Ref Range Status   01/29/2022   Final     Comment:     <15 Indicative of kidney failure.       ASSESSMENT:  Diagnoses and all orders for this visit:    1. PVD (peripheral vascular disease) (Primary)  -     Case  Request; Standing  -     sodium chloride 0.9 % flush 10 mL  -     sodium chloride 0.9 % flush 10 mL  -     sodium chloride 0.9 % infusion 40 mL  -     sodium chloride 0.9 % infusion  -     Case Request    2. Atherosclerosis of native arteries of right leg with ulceration of heel and midfoot  -     Case Request; Standing  -     Case Request    3. Mixed hyperlipidemia    4. Primary hypertension    5. Mucopurulent chronic bronchitis    6. Coronary artery disease involving native coronary artery of native heart without angina pectoris    7. Bilateral carotid artery stenosis    8. MIKE and COPD overlap syndrome    9. Type 2 diabetes mellitus with diabetic peripheral angiopathy with gangrene, unspecified whether long term insulin use    10. Hx of BKA, left    11. ESRD (end stage renal disease) on dialysis    Other orders  -     Provide NPO Instructions to Patient; Future  -     Chlorhexidine Skin Prep; Future  -     Obtain Informed Consent; Standing  -     Chlorhexidine Skin Prep; Standing  -     Clip Bilateral Groins; Standing  -     Insert Peripheral IV; Standing  -     Saline Lock & Maintain IV Access; Standing    PLAN:  Detailed discussion with Yamileth Slater regarding situation, options and plans.  Severe ischemic tissue loss, nonhealing wound RIGHT foot.  Noninvasive studies indicate severe peripheral vascular disease.  Requires additional urgent evaluation with arteriography to evaluate options for improving blood flow to feet, healing wounds and avoiding limb loss.    Risks, including but not limited to:  pain, infection, bleeding, renal failure (dialysis), nerve or blood vessel injury, need for emergent open operation to restore blood flow.  Benefits: relief of symptoms, reduction in risk of limb loss, improved wound healing.  Options:  Medical therapy, alternative imaging discussed.  Understands and wishes to proceed with plan.    Abdominal Aortogram, bilateral lower extremity runoff, possible balloon  angioplasty, thrombolysis, atherectomy or stent.  Local/IV sedation.  SDS.  9/21/2023    Return after above studies complete  Obesity Class  3. Increased risk cardiovascular events, sleep and breathing disorders, joint issues, type 2 diabetes mellitus. Options for weight management, heart healthy diet, exercise programs, and associated health risks of obesity discussed.  Recommended regular physical activity, progressive walking program.  Continue current medications as directed.  Smoking cessation advised and assistance options offered including behavioral counseling ([facility]essation [facility]), Nicotine replacement therapy (patches or gum), pharmacologic therapy (Chantix, Wellbutrin). patient understands that continued use of tobacco products increases risk of heart disease, stroke, cancer; counseling for 3-5min.    Thank you for the opportunity to participate in this patient's care.    Copy to primary care provider.

## 2023-09-15 NOTE — CASE COMMUNICATION
Patient missed an CASSIE  visit from [facility] on 9/15/23    Reason: Attempted to call patient multiple times throughout the day and unable to leave a message due to patients phones were not set up to takes messages.  Unable to reach patient.  Will follow up on next scheduled visit. Thank you.       For your records only.   As per home health protocol, MD must be notified of missed/cancelled visits; therefore the prescribed frequen cy was not met.

## 2023-09-18 ENCOUNTER — HOME CARE VISIT (OUTPATIENT)
Dept: HOME HEALTH SERVICES | Facility: CLINIC | Age: 63
End: 2023-09-18
Payer: MEDICARE

## 2023-09-18 VITALS
OXYGEN SATURATION: 98 % | DIASTOLIC BLOOD PRESSURE: 60 MMHG | TEMPERATURE: 97.3 F | HEART RATE: 62 BPM | SYSTOLIC BLOOD PRESSURE: 110 MMHG | RESPIRATION RATE: 18 BRPM

## 2023-09-18 VITALS
DIASTOLIC BLOOD PRESSURE: 70 MMHG | TEMPERATURE: 97.1 F | HEART RATE: 62 BPM | OXYGEN SATURATION: 96 % | RESPIRATION RATE: 20 BRPM | SYSTOLIC BLOOD PRESSURE: 154 MMHG

## 2023-09-18 PROCEDURE — G0300 HHS/HOSPICE OF LPN EA 15 MIN: HCPCS

## 2023-09-18 PROCEDURE — G0157 HHC PT ASSISTANT EA 15: HCPCS

## 2023-09-19 ENCOUNTER — READMISSION MANAGEMENT (OUTPATIENT)
Dept: CALL CENTER | Facility: HOSPITAL | Age: 63
End: 2023-09-19
Payer: MEDICARE

## 2023-09-19 PROBLEM — N18.6 ESRD (END STAGE RENAL DISEASE) ON DIALYSIS: Status: RESOLVED | Noted: 2023-05-11 | Resolved: 2023-09-19

## 2023-09-19 PROBLEM — J96.01 ACUTE RESPIRATORY FAILURE WITH HYPOXIA: Status: RESOLVED | Noted: 2023-03-15 | Resolved: 2023-09-19

## 2023-09-19 PROBLEM — Z99.2 ESRD (END STAGE RENAL DISEASE) ON DIALYSIS: Status: RESOLVED | Noted: 2023-05-11 | Resolved: 2023-09-19

## 2023-09-19 NOTE — OUTREACH NOTE
Medical Week 3 Survey      Flowsheet Row Responses   [facility] patient discharged from? [facility]   Does the patient have one of the following disease processes/diagnoses(primary or secondary)? Other   Week 3 attempt successful? No   Unsuccessful attempts Attempt 2   Discharge diagnosis Hyperkalemia            АЛЕКСАНДР BOWLES - Registered Nurse

## 2023-09-19 NOTE — PROGRESS NOTES
9/19/2023    Ymaileth Slater  1960    Chief Complaint:  No chief complaint on file.      HPI:      PCP:  Kiersten Wilson APRN  Cardiology: Dr. Davis  Nephrology:  Dr. Jacobo, Monday Wednesday Friday dialysis via   Podiatry: Dr. Oliveira    63y.o. female with HTN(stable, increased risk stroke, rupture), Hyperlipidemia(stable, increased risk cardiovascular events), Diabetes Mellitus(stable, increased risk cardiovascular events), Obesity(uncontrolled, increased risk cardiovascular events), PVD(chronic severe progression, increase risk cardiovascular events), Chronic Kidney Disease(stable, increased risk renal failure), COPD with chronic, O2 use over the last 3 years, coronary artery disease multiple interventions, peripheral vascular disease multiple distal intervention, poorly controlled diabetes with diabetic foot infections she is status post transmit, delayed wound healing, history of MRSA CRE, on dialysis, poor follow-up.  Had prior appointment with us to evaluate for placement of AV fistula patient canceled appointment.  former smoker.  Quit 1999.  Patient established with vascular surgery today at the referral of Dr. Oliveira from wound care.  Patient is medically complex, multiple hospitalizations over the past few years.  Most recent hospitalization in September 2022 secondary to diabetic foot infections was transferred to [facility] in [facility] for infectious disease evaluation.  She subsequently underwent vascular evaluation and bilateral lower extremity arteriogram with intervention to both distal SFA and popliteal, she also went left transmetatarsal amputation at that time.  Surgical site dehiscence of left transmit site has had prolonged wound care with poor healing.  Reports today with SUNDEEP to evaluate perfusion status as it pertains to her healing potential and whether or not she is a candidate for additional revascularization should that be indicated no other associated signs,  symptoms or modifying factors.    2013 CABG x3  2014 attempted left carotid endarterectomy, procedure aborted unable to find carotid (Dr. Aguayo)  5/2014 L Transfemoral stent (Dr. Robles)  2014 arteriogram: PTA right SFA stent (Dr. Robles)  11/2021 LHC: Distal left main ostial circumflex stenosis, patent LIMA to LAD, 100% occluded RCA with occluded SVG to RCA  11/2021 PTA stent distal left main [facility]    1/2022 tunnel cath placement  3/2023 tunnel cath placement  AV access arm    9/2022 bilateral lower extremity arteriogram: PTA proximal SFA, PTA and stent stenosis right SFA, PTA/stent right popliteal.  Left SFA PTA stent Olevia 6 x 40 mm 6 x 120 mm due to dissection, in stent stenosis post intervention (DH)  9/2022 left transmetatarsal amputation (DH)    12/2022 SUNDEEP: Right 0.55 biphasic.  Left 0.5 triphasic femoral, biphasic popliteal, monophasic distally.  Limited evaluation of waveforms secondary to heart rhythm and body habitus    The following portions of the patient's history were reviewed and updated as appropriate: allergies, current medications, past family history, past medical history, past social history, past surgical history and problem list.  Recent images independently reviewed.  Available laboratory values reviewed.    PMH:  Past Medical History:   Diagnosis Date    Acute blood loss anemia 04/16/2017    Likely due to gastric oozing at this time. - Dr. Duarte (GI) was consulted and has now signed off, will follow up outpatient - pill colonoscopy showed AVMs - continue to monitor    Acute cystitis with hematuria 03/31/2021 1/13: IV Rocephin 1 gm q 24 1/14 : transitioned  to omnicef 300mg. Urine cultures resulted and did not show growth. Omnicef discontinued as patient is asymptomatic    Altered mental status 01/09/2022    - AMS on presentation - initial ABG pH 7.3, CO2 34 - Procal 0.29 - UA negative for acute cysitits -CTA head wnl  - Empiric Zosyn and Vancomycin -Lactate  2.5 on admission  - blood cultures no growth at 24 hours      Anxiety     CAD (coronary artery disease) 04/24/2021    S/P 3 stents 5/1/2021 for [facility] Continue ASA 81mg & Clopidogrel 75mg Continue Atorvastatin 40mg    Carotid artery stenosis     CHF (congestive heart failure)     Chronic obstructive lung disease     CKD (chronic kidney disease) stage 4, GFR 15-29 ml/min     CKD (chronic kidney disease), symptom management only, stage 5 10/05/2020    Results from last 7 days Lab Units 12/15/21 0548 12/14/21 1323 12/14/21 0916 CREATININE mg/dL 3.92* 3.21* 3.32*  Baseline creatinine 2-3 GFR 13-25 GFR 15 Dialysis MWF, sees Dr. Lauren Nephrology consult,, appreciate recommendations Continue Bumex 1mg bid daily Holding Bumex 2mg 4 times a week    Colonic polyp     Coronary arteriosclerosis     Diabetes mellitus     Diabetic neuropathy     Ear pain, right 10/18/2021    - canal trauma due to patient scratching and DMT2 - added cortisporin ear drops    Elevated troponin 10/12/2021    -most likely from CKD -Trending down -Neg chest pain    Generalized abdominal pain 07/01/2022    Could be due to initiation of tube feeds vs dyspepsia vs abdominal cramps related to no PO intake due to intubation vs constipation Continue current laxative regiment  If no bowel movement by this afternoon will consider enema    GERD (gastroesophageal reflux disease)     GI bleed 05/13/2021    - GI will follow up outpatient - Protonix 40mg daily - Avoid medical DVT prophy and use mechanical at this time instead. - Continue to monitor - pill colonoscopy results showed AVMs    History of transfusion     Hypercholesterolemia     Hyperosmolar hyperglycemic state (HHS) 06/25/2022    Serum glucose 605 on admission  Anion gap 16 PH 7.37 Bicarb 27.9 Continue fluids  Insulin drip with HHS protocol  Anion gap closed around 10 AM, received one dose of Levamir subq, will stop insulin drip after 2 hrs      Hypertension     Hypokalemia 05/27/2022    Will replace as  needed. Will be cautious in the setting of ESRD to avoid need for emergency dialysis   Monitor Qtc intervals on EKG      Hypomagnesemia 06/27/2021    Monitor and replace    Morbid obesity     Nephrolithiasis     On mechanically assisted ventilation 06/26/2022    Vent management and sedation orders placed.  - Counts include 234 beds at the [facility] intensivist group consulted for vent management appreciate recommendations  - plan to extubate today      Peripheral vascular disease     Pleural effusion on right 06/26/2022    CXR on 6/30/22 read as a small upper left pulmonary edema vs early pneumonia.  Last Echo 1/2022 EF 61-65 % Continue to monitor  Procal slightly improved, CRP improved On Linezolid and meropenum       PVD (peripheral vascular disease)     SIRS (systemic inflammatory response syndrome) 01/09/2022    Admission  - WBC 17.78   -   - RR 16 - 1/10: VSS/wnl - CXR - Mild pulm edema - Blood cultures no growth at 24 hours  - Procalcitonin 0.29 - UA : glucose 1000, negative Leucocytes/nitrate - Empiric Zosyn and Vancomcyin     Sleep apnea     Substance abuse     Vitamin D deficiency      Past Surgical History:   Procedure Laterality Date    AMPUTATION DIGIT Right 2/27/2023    Procedure: Excision of right first metatarsal head, right second toe amputation;  Surgeon: Jean Oliveira DPM;  Location: [facility] OR;  Service: Podiatry;  Laterality: Right;    BELOW KNEE AMPUTATION Left 12/16/2022    Procedure: AMPUTATION BELOW KNEE, LEFT;  Surgeon: Huy White MD;  Location: [facility] OR;  Service: Orthopedics;  Laterality: Left;    CARDIAC CATHETERIZATION N/A 07/14/2020    CARDIAC CATHETERIZATION N/A 04/23/2021    Procedure: Left Heart Cath;  Surgeon: Melba Romo MD;  Location: [facility] CATH INVASIVE LOCATION;  Service: Cardiology;  Laterality: N/A;    CARDIAC CATHETERIZATION N/A 04/30/2021    Procedure: Percutaneous Coronary Intervention;  Surgeon: Russell Voss MD;  Location: [facility] CATH INVASIVE LOCATION;   Service: Cardiovascular;  Laterality: N/A;    CARDIAC CATHETERIZATION N/A 04/30/2021    Procedure: Stent NIKKI coronary;  Surgeon: Russell Voss MD;  Location: [facility] CATH INVASIVE LOCATION;  Service: Cardiovascular;  Laterality: N/A;    CARDIAC CATHETERIZATION Left 11/13/2021    Procedure: Left Heart Cath;  Surgeon: Niall Rios MD;  Location: [facility] CATH INVASIVE LOCATION;  Service: Cardiology;  Laterality: Left;    CAROTID STENT Left     COLONOSCOPY      COLONOSCOPY N/A 05/14/2021    Procedure: COLONOSCOPY;  Surgeon: Mingo Duarte MD;  Location: [facility] ENDOSCOPY;  Service: Gastroenterology;  Laterality: N/A;    CORONARY ARTERY BYPASS GRAFT N/A 2013    CABG X 3    CYSTOSCOPY BLADDER STONE LITHOTRIPSY Bilateral     ENDOSCOPY N/A 04/12/2021    Procedure: ESOPHAGOGASTRODUODENOSCOPY;  Surgeon: Mingo Duarte MD;  Location: [facility] ENDOSCOPY;  Service: Gastroenterology;  Laterality: N/A;    ENDOSCOPY N/A 05/14/2021    Procedure: ESOPHAGOGASTRODUODENOSCOPY;  Surgeon: Mingo Duarte MD;  Location: [facility] ENDOSCOPY;  Service: Gastroenterology;  Laterality: N/A;    ENDOSCOPY N/A 01/28/2022    Procedure: ESOPHAGOGASTRODUODENOSCOPY;  Surgeon: Mingo Duarte MD;  Location: [facility] ENDOSCOPY;  Service: Gastroenterology;  Laterality: N/A;    HYSTERECTOMY      INTERVENTIONAL RADIOLOGY PROCEDURE N/A 10/21/2021    Procedure: tunneled central venous catheter placement;  Surgeon: Donnie Robles MD;  Location: [facility] ANGIO INVASIVE LOCATION;  Service: Interventional Radiology;  Laterality: N/A;    INTERVENTIONAL RADIOLOGY PROCEDURE N/A 01/27/2022    Procedure: tunneled central venous catheter placement;  Surgeon: Donnie Robles MD;  Location: [facility] ANGIO INVASIVE LOCATION;  Service: Interventional Radiology;  Laterality: N/A;    LAPAROSCOPIC TUBAL LIGATION      TUNNELED VENOUS CATHETER PLACEMENT       Family History   Problem Relation Age of Onset    Heart disease Mother     Lung cancer  Mother     Heart disease Father     Heart attack Father     Diabetes Father     Heart disease Half-Sister         Dad's side    Heart disease Brother     No Known Problems Sister     No Known Problems Sister     No Known Problems Sister     No Known Problems Sister     No Known Problems Sister     Pancreatic cancer Half-Sister         Dad's side    No Known Problems Brother     No Known Problems Brother     Heart attack Half-Brother     Heart attack Half-Brother     No Known Problems Maternal Grandmother     No Known Problems Maternal Grandfather     No Known Problems Paternal Grandmother     No Known Problems Paternal Grandfather      Social History     Tobacco Use    Smoking status: Some Days     Packs/day: 0.25     Years: 46.00     Pack years: 11.50     Types: Cigarettes     Start date: 2/1/1999    Smokeless tobacco: Never   Vaping Use    Vaping Use: Every day    Substances: Nicotine    Devices: Disposable   Substance Use Topics    Alcohol use: No    Drug use: Not Currently     Types: Marijuana       ALLERGIES:  Allergies   Allergen Reactions    Adhesive Tape Hives, Other (See Comments) and Rash    Nsaids Hives    Latex Other (See Comments) and Hives    Other Itching     Bandaids, MRSA, SURECLOSE    Wound Dressing Adhesive Hives and Rash         MEDICATIONS:    Current Outpatient Medications:     albuterol (PROVENTIL) (2.5 MG/3ML) 0.083% nebulizer solution, Inhale the contents of 1 vial by nebulization Every 4 (Four) Hours As Needed for Wheezing., Disp: 75 mL, Rfl: 3    albuterol sulfate  (90 Base) MCG/ACT inhaler, Inhale 2 puffs Every 4 (Four) Hours As Needed for Wheezing., Disp: 18 g, Rfl: 1    atorvastatin (LIPITOR) 40 MG tablet, Take 1 tablet by mouth Daily. Indications: High Amount of Fats in the Blood, Disp: , Rfl:     Blood Glucose Monitoring Suppl ([employer] Blood Glucose Meter) w/Device kit, 1 each 3 (Three) Times a Day., Disp: 1 kit, Rfl: 3    Cetirizine HCl 10 MG capsule, Take 1 capsule by mouth Every  "Night. Indications: Hayfever, Disp: , Rfl:     cyclobenzaprine (FLEXERIL) 5 MG tablet, Take 1 tablet by mouth 2 (Two) Times a Day. Indications: Muscle Spasm, Disp: , Rfl:     Diclofenac Sodium (VOLTAREN) 1 % gel gel, Apply 4 g topically to the appropriate area as directed 4 (Four) Times a Day As Needed (Ankle pain)., Disp: 350 g, Rfl: 2    docusate sodium (COLACE) 100 MG capsule, Take 1 capsule by mouth 2 (Two) Times a Day. Indications: Constipation, Disp: , Rfl:     DULoxetine (CYMBALTA) 20 MG capsule, Take 1 capsule by mouth Daily. Indications: Disease of the Peripheral Nerves, Disp: , Rfl:     Easy Touch Insulin Syringe 30G X 5/16\" 0.5 ML misc, USE AS DIRECTED WITH LEVEMIR, Disp: , Rfl:     EASY TOUCH PEN NEEDLES 31G X 8 MM misc, , Disp: , Rfl:     furosemide (LASIX) 20 MG tablet, Take 1 tablet by mouth Daily. Indications: Edema, Disp: , Rfl:     hydrOXYzine (ATARAX) 25 MG tablet, Take 1 tablet by mouth Every 8 (Eight) Hours As Needed for Itching. Indications: Itching, Disp: , Rfl:     insulin detemir (LEVEMIR) 100 UNIT/ML injection, Inject 24 Units under the skin into the appropriate area as directed 2 (Two) Times a Day. pt takes in the am and pm  Indications: T2DM, Disp: , Rfl:     Insulin Lispro, 1 Unit Dial, (HUMALOG) 100 UNIT/ML solution pen-injector, Inject 14-35 Units under the skin into the appropriate area as directed 3 (Three) Times a Day With Meals. Takes 14 units with meals plus sliding scale Sliding scale:  150-199 take 4 units 200-249 take 8 units 250-299 take 12 units 300-349 take 16 units 350-400 take 20 units greater than 400, call physician, Disp: , Rfl:     Insulin Pen Needle (NovoFine) 30G X 8 MM misc, As directed 4 times daily, Disp: 100 each, Rfl: 11    Insulin Pen Needle 31G X 8 MM misc, Use to inject insulin 4 (Four) Times a Day as directed., Disp: 120 each, Rfl: 12    ipratropium-albuterol (DUO-NEB) 0.5-2.5 mg/3 ml nebulizer, Take 3 mL by nebulization 4 (Four) Times a Day As Needed. " Indications: Chronic Obstructive Lung Disease, Disp: , Rfl:     memantine (NAMENDA) 5 MG tablet, Take 1 tablet by mouth 2 (Two) Times a Day. Indications: Cognitive Dysfunction, Disp: , Rfl:     nitroglycerin (NITROSTAT) 0.4 MG SL tablet, Place 1 tablet under the tongue Every 5 (Five) Minutes As Needed for Chest Pain (x 3 doses). Indications: Acute Angina Pectoris, Disp: , Rfl:     O2 (OXYGEN), Inhale 2 L/min Continuous. Only uses when patient takes Trilogy  Indications: SOA, Disp: , Rfl:     ondansetron (ZOFRAN) 4 MG tablet, Take 1 tablet by mouth Every 6 (Six) Hours As Needed for Nausea or Vomiting., Disp: , Rfl:     oxyCODONE (ROXICODONE) 10 MG tablet, Take 1 tablet by mouth 3 (Three) Times a Day. Indications: Acute Pain, Chronic Pain, Disp: , Rfl:     pantoprazole (PROTONIX) 40 MG EC tablet, Take 1 tablet by mouth Every Night., Disp: 90 tablet, Rfl: 0    promethazine (PHENERGAN) 25 MG tablet, Take 1 tablet by mouth Every 6 (Six) Hours As Needed for Nausea or Vomiting. Indications: Nausea and Vomiting, Disp: , Rfl:     sevelamer (RENVELA) 800 MG tablet, Take 1 tablet by mouth 3 (Three) Times a Day With Meals. Indications: High Amount of Phosphate in the Blood, Disp: , Rfl:     Vitamin D, Ergocalciferol, 84143 units capsule, Take 1 capsule by mouth 1 (One) Time Per Week. Take on Wednesday  Indications: Kidney Failure Syndrome, Disp: , Rfl:     carvedilol (COREG) 12.5 MG tablet, Take 1 tablet by mouth 2 (Two) Times a Day With Meals. Indications: High Blood Pressure Disorder, Disp: 180 tablet, Rfl: 6    clopidogrel (PLAVIX) 75 MG tablet, Take 1 tablet by mouth Daily. (Patient taking differently: Take 1 tablet by mouth Daily.), Disp: 30 tablet, Rfl: 5    gabapentin (NEURONTIN) 300 MG capsule, Take 1 capsule by mouth Daily for 3 days., Disp: 3 capsule, Rfl: 0    insulin aspart (novoLOG FLEXPEN) 100 UNIT/ML solution pen-injector sc pen, Inject 14 Units under the skin into the appropriate area as directed 4 (Four) Times  a Day Before Meals & at Bedtime. 14 units with meals AND sliding scale as follows.. 150-199 take 4 units  200-249 take 8 units  250-299 take 12 units  300-349 take 16 units  350-400 take 20 units  greater than 400, call physician,  Indications: T2DM, Disp: , Rfl:     Review of Systems   Review of Systems   Constitutional: Positive for malaise/fatigue. Negative for weight loss.   Cardiovascular:  Positive for dyspnea on exertion. Negative for chest pain and claudication.   Respiratory:  Negative for cough and shortness of breath.    Hematologic/Lymphatic: Bruises/bleeds easily.   Skin:  Negative for color change and poor wound healing.   Musculoskeletal:  Positive for back pain, joint pain and myalgias.   Neurological:  Negative for dizziness, numbness and weakness.     Physical Exam   Vitals:    07/10/23 1440   BP: 126/82   BP Location: Left arm   Pulse: 75   Temp: 97.5 °F (36.4 °C)   TempSrc: Infrared   SpO2: 97%     There is no height or weight on file to calculate BSA.  There is no height or weight on file to calculate BMI.  Physical Exam  Constitutional:       General: She is not in acute distress.     Appearance: She is not ill-appearing.   HENT:      Right Ear: Hearing normal.      Left Ear: Hearing normal.      Nose: No nasal deformity.      Mouth/Throat:      Dentition: Normal dentition. Does not have dentures.   Cardiovascular:      Rate and Rhythm: Normal rate and regular rhythm.      Pulses:           Carotid pulses are 2+ on the right side and 2+ on the left side.       Radial pulses are 2+ on the right side and 2+ on the left side.        Dorsalis pedis pulses are detected w/ Doppler on the right side. Left dorsalis pedis pulse not accessible.        Posterior tibial pulses are detected w/ Doppler on the right side. Left posterior tibial pulse not accessible.      Heart sounds: No murmur heard.     Comments: LEFT BKA  Pulmonary:      Effort: Pulmonary effort is normal.      Breath sounds: Normal breath  sounds.   Abdominal:      General: There is no distension.      Palpations: Abdomen is soft. There is no mass.      Tenderness: There is no abdominal tenderness.   Musculoskeletal:         General: No deformity.      Comments: Gait normal.    Skin:     General: Skin is warm and dry.      Coloration: Skin is not pale.      Findings: No erythema.      Comments: No venous staining   Neurological:      Mental Status: She is alert and oriented to person, place, and time.   Psychiatric:         Speech: Speech normal.         Behavior: Behavior is cooperative.         Thought Content: Thought content normal.         Judgment: Judgment normal.       BUN   Date Value Ref Range Status   09/02/2023 38 (H) 8 - 23 mg/dL Final   08/25/2021 63 (H) 8 - 26 mg/dL Final     Creatinine   Date Value Ref Range Status   09/02/2023 4.72 (H) 0.57 - 1.00 mg/dL Final   08/25/2021 2.84 (H) 0.57 - 1.11 mg/dL Final     eGFR Non  Amer   Date Value Ref Range Status   02/26/2022 15 (L) >60 mL/min/1.73 Final     eGFR   Amer   Date Value Ref Range Status   01/29/2022   Final     Comment:     <15 Indicative of kidney failure.       ASSESSMENT:  Diagnoses and all orders for this visit:    1. Chronic midline low back pain without sciatica (Primary)    2. Mixed hyperlipidemia    3. Primary hypertension    4. Mucopurulent chronic bronchitis    5. Coronary artery disease involving native coronary artery of native heart without angina pectoris    6. Bilateral carotid artery stenosis    7. Pulmonary hypertension    8. MIKE and COPD overlap syndrome    9. Coronary artery disease involving native coronary artery of native heart with unstable angina pectoris    10. ESRD on hemodialysis    11. Hx of BKA, left    PLAN:  Detailed discussion with Yamileth Slater regarding situation and options.  functional long term dialysis access.  Ready for tunneled cath removal.      Risks:  Bleeding, catheter breakage which may require retrieval.  Benefits:   Catheter removal, decreased incidence infection, blood clots.  Options:  None.  Understands and wishes to proceed.    Remove tunneled cath today.  Return in 3 months with fistula duplex.    Return after above studies complete  Obesity Class  3. Increased risk cardiovascular events, sleep and breathing disorders, joint issues, type 2 diabetes mellitus. Options for weight management, heart healthy diet, exercise programs, and associated health risks of obesity discussed.  Smoking cessation advised and assistance options offered including behavioral counseling (Smoking Cessation Classes), Nicotine replacement therapy (patches or gum), pharmacologic therapy (Chantix, Wellbutrin). patient understands that continued use of tobacco products increases risk and progression of heart disease, peripheral vascular disease, stroke, cancer; counseling for 3-5min.    Recommended regular physical activity, progressive walking program.  Continue current medications as directed.  Advance Care Planning   ACP discussion was declined by the patient. Patient does not have an advance directive, declines further assistance.     Thank you for the opportunity to participate in this patient's care.    Copy to primary care provider.         OPERATIVE NOTE  Yamileth Sylvester Bryon  1960      PREOP DIAGNOSES:  ESRD    POSTOP DIAGNOSES:  ESRD    PROCEDURE:  Removal tunnelled central venous catheter with cuff    SURGEON: ALAN Robles MD FACS RPVI    ASSISTANT: Keith Lang CFA  provided critical assistance during the case including suctioning, exposure, retraction, and reduction of blood loss.    ANESTHESIA: local 1% lidocaine    ESTIMATED BLOOD LOSS: minimal    SPECIMEN:  None    COMPLICATIONS: None    DESCRIPTION OF OPERATION: taken to procedure room, placed supine position, prepped draped sterile fashion.  1% lidocaine local anesthesia around cuff.      Exit site opened with 11 blade.  Cuff dissected free.  Catheter removed intact.   Manual pressure held at vein insertion site with good hemostasis.  Pressure dressing applied.  Tolerated procedure well, home.            This document has been electronically signed by Donnie Robles MD on September 19, 2023 07:41 CDT

## 2023-09-22 ENCOUNTER — HOSPITAL ENCOUNTER (OUTPATIENT)
Facility: HOSPITAL | Age: 63
Setting detail: HOSPITAL OUTPATIENT SURGERY
Discharge: HOME | End: 2023-09-22
Attending: THORACIC SURGERY (CARDIOTHORACIC VASCULAR SURGERY) | Admitting: THORACIC SURGERY (CARDIOTHORACIC VASCULAR SURGERY)

## 2023-09-22 ENCOUNTER — ANESTHESIA (OUTPATIENT)
Dept: INTERVENTIONAL RADIOLOGY/VASCULAR | Facility: HOSPITAL | Age: 63
End: 2023-09-22
Payer: MEDICARE

## 2023-09-22 ENCOUNTER — HOME CARE VISIT (OUTPATIENT)
Dept: HOME HEALTH SERVICES | Facility: CLINIC | Age: 63
End: 2023-09-22
Payer: MEDICARE

## 2023-09-22 ENCOUNTER — APPOINTMENT (OUTPATIENT)
Dept: ULTRASOUND IMAGING | Facility: HOSPITAL | Age: 63
End: 2023-09-22

## 2023-09-22 VITALS
BODY MASS INDEX: 46.53 KG/M2 | HEIGHT: 62 IN | OXYGEN SATURATION: 96 % | RESPIRATION RATE: 20 BRPM | WEIGHT: 252.87 LBS | SYSTOLIC BLOOD PRESSURE: 116 MMHG | TEMPERATURE: 97 F | DIASTOLIC BLOOD PRESSURE: 53 MMHG | HEART RATE: 73 BPM

## 2023-09-22 DIAGNOSIS — I73.9 PVD (PERIPHERAL VASCULAR DISEASE): ICD-10-CM

## 2023-09-22 DIAGNOSIS — I70.234 ATHEROSCLEROSIS OF NATIVE ARTERIES OF RIGHT LEG WITH ULCERATION OF HEEL AND MIDFOOT: ICD-10-CM

## 2023-09-22 LAB
ANION GAP SERPL CALCULATED.3IONS-SCNC: 10 MMOL/L (ref 5–15)
BUN SERPL-MCNC: 27 MG/DL (ref 8–23)
BUN/CREAT SERPL: 7.6 (ref 7–25)
CALCIUM SPEC-SCNC: 9.8 MG/DL (ref 8.6–10.5)
CHLORIDE SERPL-SCNC: 97 MMOL/L (ref 98–107)
CO2 SERPL-SCNC: 28 MMOL/L (ref 22–29)
CREAT SERPL-MCNC: 3.55 MG/DL (ref 0.57–1)
EGFRCR SERPLBLD CKD-EPI 2021: 13.8 ML/MIN/1.73
GLUCOSE BLDC GLUCOMTR-MCNC: 151 MG/DL (ref 70–130)
GLUCOSE SERPL-MCNC: 148 MG/DL (ref 65–99)
POTASSIUM SERPL-SCNC: 5.3 MMOL/L (ref 3.5–5.2)
SODIUM SERPL-SCNC: 135 MMOL/L (ref 136–145)

## 2023-09-22 PROCEDURE — C1894 INTRO/SHEATH, NON-LASER: HCPCS | Performed by: THORACIC SURGERY (CARDIOTHORACIC VASCULAR SURGERY)

## 2023-09-22 PROCEDURE — 75716 ARTERY X-RAYS ARMS/LEGS: CPT | Performed by: THORACIC SURGERY (CARDIOTHORACIC VASCULAR SURGERY)

## 2023-09-22 PROCEDURE — C1725 CATH, TRANSLUMIN NON-LASER: HCPCS | Performed by: THORACIC SURGERY (CARDIOTHORACIC VASCULAR SURGERY)

## 2023-09-22 PROCEDURE — C1769 GUIDE WIRE: HCPCS | Performed by: THORACIC SURGERY (CARDIOTHORACIC VASCULAR SURGERY)

## 2023-09-22 PROCEDURE — 82948 REAGENT STRIP/BLOOD GLUCOSE: CPT

## 2023-09-22 PROCEDURE — C1874 STENT, COATED/COV W/DEL SYS: HCPCS | Performed by: THORACIC SURGERY (CARDIOTHORACIC VASCULAR SURGERY)

## 2023-09-22 PROCEDURE — 37228 PR REVSC OPN/PRQ TIB/PERO W/ANGIOPLASTY UNI: CPT | Performed by: THORACIC SURGERY (CARDIOTHORACIC VASCULAR SURGERY)

## 2023-09-22 PROCEDURE — 76937 US GUIDE VASCULAR ACCESS: CPT

## 2023-09-22 PROCEDURE — C2623 CATH, TRANSLUMIN, DRUG-COAT: HCPCS | Performed by: THORACIC SURGERY (CARDIOTHORACIC VASCULAR SURGERY)

## 2023-09-22 PROCEDURE — 25010000002 MIDAZOLAM PER 1 MG

## 2023-09-22 PROCEDURE — 37226 PR REVSC OPN/PRQ FEM/POP W/STNT/ANGIOP SM VSL: CPT | Performed by: THORACIC SURGERY (CARDIOTHORACIC VASCULAR SURGERY)

## 2023-09-22 PROCEDURE — 25010000002 FENTANYL CITRATE (PF) 100 MCG/2ML SOLUTION

## 2023-09-22 PROCEDURE — 80048 BASIC METABOLIC PNL TOTAL CA: CPT | Performed by: ANESTHESIOLOGY

## 2023-09-22 PROCEDURE — 25010000002 HEPARIN (PORCINE) PER 1000 UNITS: Performed by: THORACIC SURGERY (CARDIOTHORACIC VASCULAR SURGERY)

## 2023-09-22 PROCEDURE — C1887 CATHETER, GUIDING: HCPCS | Performed by: THORACIC SURGERY (CARDIOTHORACIC VASCULAR SURGERY)

## 2023-09-22 PROCEDURE — C1760 CLOSURE DEV, VASC: HCPCS | Performed by: THORACIC SURGERY (CARDIOTHORACIC VASCULAR SURGERY)

## 2023-09-22 PROCEDURE — C1876 STENT, NON-COA/NON-COV W/DEL: HCPCS | Performed by: THORACIC SURGERY (CARDIOTHORACIC VASCULAR SURGERY)

## 2023-09-22 PROCEDURE — 25510000001 IOPAMIDOL 61 % SOLUTION: Performed by: THORACIC SURGERY (CARDIOTHORACIC VASCULAR SURGERY)

## 2023-09-22 PROCEDURE — 94640 AIRWAY INHALATION TREATMENT: CPT

## 2023-09-22 PROCEDURE — 25010000002 PROPOFOL 200 MG/20ML EMULSION

## 2023-09-22 DEVICE — DRUG-ELUTING VASCULAR STENT SYSTEM
Type: IMPLANTABLE DEVICE | Status: FUNCTIONAL
Brand: ELUVIA™

## 2023-09-22 DEVICE — LIFESTENT® 5F VASCULAR STENT SYSTEM, 6 MM X 150 MM (135 CM DELIVERY CATHETER)
Type: IMPLANTABLE DEVICE | Status: FUNCTIONAL
Brand: LIFESTENT®

## 2023-09-22 RX ORDER — SODIUM CHLORIDE 9 MG/ML
100 INJECTION, SOLUTION INTRAVENOUS CONTINUOUS
Status: DISCONTINUED | OUTPATIENT
Start: 2023-09-22 | End: 2023-09-22 | Stop reason: HOSPADM

## 2023-09-22 RX ORDER — MIDAZOLAM HYDROCHLORIDE 1 MG/ML
INJECTION INTRAMUSCULAR; INTRAVENOUS AS NEEDED
Status: DISCONTINUED | OUTPATIENT
Start: 2023-09-22 | End: 2023-09-22 | Stop reason: SURG

## 2023-09-22 RX ORDER — HEPARIN SODIUM 1000 [USP'U]/ML
INJECTION, SOLUTION INTRAVENOUS; SUBCUTANEOUS AS NEEDED
Status: DISCONTINUED | OUTPATIENT
Start: 2023-09-22 | End: 2023-09-22 | Stop reason: HOSPADM

## 2023-09-22 RX ORDER — LEVOFLOXACIN 750 MG/1
750 TABLET ORAL DAILY
COMMUNITY

## 2023-09-22 RX ORDER — ONDANSETRON 2 MG/ML
4 INJECTION INTRAMUSCULAR; INTRAVENOUS ONCE AS NEEDED
Status: DISCONTINUED | OUTPATIENT
Start: 2023-09-22 | End: 2023-09-22 | Stop reason: HOSPADM

## 2023-09-22 RX ORDER — FENTANYL CITRATE 50 UG/ML
INJECTION, SOLUTION INTRAMUSCULAR; INTRAVENOUS AS NEEDED
Status: DISCONTINUED | OUTPATIENT
Start: 2023-09-22 | End: 2023-09-22 | Stop reason: SURG

## 2023-09-22 RX ORDER — ACETAMINOPHEN 325 MG/1
650 TABLET ORAL EVERY 4 HOURS PRN
Status: DISCONTINUED | OUTPATIENT
Start: 2023-09-22 | End: 2023-09-22 | Stop reason: HOSPADM

## 2023-09-22 RX ORDER — LIDOCAINE HYDROCHLORIDE 20 MG/ML
INJECTION, SOLUTION INFILTRATION; PERINEURAL AS NEEDED
Status: DISCONTINUED | OUTPATIENT
Start: 2023-09-22 | End: 2023-09-22 | Stop reason: HOSPADM

## 2023-09-22 RX ORDER — IPRATROPIUM BROMIDE AND ALBUTEROL SULFATE 2.5; .5 MG/3ML; MG/3ML
3 SOLUTION RESPIRATORY (INHALATION) ONCE
Status: COMPLETED | OUTPATIENT
Start: 2023-09-22 | End: 2023-09-22

## 2023-09-22 RX ORDER — SODIUM CHLORIDE 0.9 % (FLUSH) 0.9 %
10 SYRINGE (ML) INJECTION AS NEEDED
Status: DISCONTINUED | OUTPATIENT
Start: 2023-09-22 | End: 2023-09-22 | Stop reason: HOSPADM

## 2023-09-22 RX ORDER — SODIUM CHLORIDE 9 MG/ML
40 INJECTION, SOLUTION INTRAVENOUS AS NEEDED
Status: DISCONTINUED | OUTPATIENT
Start: 2023-09-22 | End: 2023-09-22 | Stop reason: HOSPADM

## 2023-09-22 RX ORDER — PROPOFOL 10 MG/ML
INJECTION, EMULSION INTRAVENOUS AS NEEDED
Status: DISCONTINUED | OUTPATIENT
Start: 2023-09-22 | End: 2023-09-22 | Stop reason: SURG

## 2023-09-22 RX ORDER — SODIUM CHLORIDE 0.9 % (FLUSH) 0.9 %
10 SYRINGE (ML) INJECTION EVERY 12 HOURS SCHEDULED
Status: DISCONTINUED | OUTPATIENT
Start: 2023-09-22 | End: 2023-09-22 | Stop reason: HOSPADM

## 2023-09-22 RX ADMIN — MIDAZOLAM HYDROCHLORIDE 2 MG: 1 INJECTION, SOLUTION INTRAMUSCULAR; INTRAVENOUS at 10:51

## 2023-09-22 RX ADMIN — PROPOFOL 10 MG: 10 INJECTION, EMULSION INTRAVENOUS at 11:56

## 2023-09-22 RX ADMIN — PROPOFOL 30 MG: 10 INJECTION, EMULSION INTRAVENOUS at 11:34

## 2023-09-22 RX ADMIN — PROPOFOL 10 MG: 10 INJECTION, EMULSION INTRAVENOUS at 11:42

## 2023-09-22 RX ADMIN — PROPOFOL 10 MG: 10 INJECTION, EMULSION INTRAVENOUS at 11:39

## 2023-09-22 RX ADMIN — PROPOFOL 10 MG: 10 INJECTION, EMULSION INTRAVENOUS at 11:27

## 2023-09-22 RX ADMIN — PROPOFOL 20 MG: 10 INJECTION, EMULSION INTRAVENOUS at 12:14

## 2023-09-22 RX ADMIN — FENTANYL CITRATE 25 MCG: 50 INJECTION, SOLUTION INTRAMUSCULAR; INTRAVENOUS at 11:13

## 2023-09-22 RX ADMIN — PROPOFOL 10 MG: 10 INJECTION, EMULSION INTRAVENOUS at 12:00

## 2023-09-22 RX ADMIN — PROPOFOL 20 MG: 10 INJECTION, EMULSION INTRAVENOUS at 11:30

## 2023-09-22 RX ADMIN — PROPOFOL 10 MG: 10 INJECTION, EMULSION INTRAVENOUS at 11:54

## 2023-09-22 RX ADMIN — IPRATROPIUM BROMIDE AND ALBUTEROL SULFATE 3 ML: .5; 3 SOLUTION RESPIRATORY (INHALATION) at 08:32

## 2023-09-22 RX ADMIN — PROPOFOL 20 MG: 10 INJECTION, EMULSION INTRAVENOUS at 12:24

## 2023-09-22 RX ADMIN — PROPOFOL 20 MG: 10 INJECTION, EMULSION INTRAVENOUS at 12:16

## 2023-09-22 RX ADMIN — PROPOFOL 20 MG: 10 INJECTION, EMULSION INTRAVENOUS at 12:10

## 2023-09-22 RX ADMIN — PROPOFOL 10 MG: 10 INJECTION, EMULSION INTRAVENOUS at 11:43

## 2023-09-22 RX ADMIN — PROPOFOL 20 MG: 10 INJECTION, EMULSION INTRAVENOUS at 12:09

## 2023-09-22 RX ADMIN — FENTANYL CITRATE 25 MCG: 50 INJECTION, SOLUTION INTRAMUSCULAR; INTRAVENOUS at 11:10

## 2023-09-22 RX ADMIN — PROPOFOL 10 MG: 10 INJECTION, EMULSION INTRAVENOUS at 11:47

## 2023-09-22 RX ADMIN — PROPOFOL 20 MG: 10 INJECTION, EMULSION INTRAVENOUS at 11:19

## 2023-09-22 RX ADMIN — PROPOFOL 10 MG: 10 INJECTION, EMULSION INTRAVENOUS at 11:59

## 2023-09-22 RX ADMIN — PROPOFOL 10 MG: 10 INJECTION, EMULSION INTRAVENOUS at 11:45

## 2023-09-22 RX ADMIN — PROPOFOL 70 MG: 10 INJECTION, EMULSION INTRAVENOUS at 12:23

## 2023-09-22 RX ADMIN — PROPOFOL 10 MG: 10 INJECTION, EMULSION INTRAVENOUS at 11:38

## 2023-09-22 RX ADMIN — PROPOFOL 10 MG: 10 INJECTION, EMULSION INTRAVENOUS at 11:57

## 2023-09-22 RX ADMIN — FENTANYL CITRATE 25 MCG: 50 INJECTION, SOLUTION INTRAMUSCULAR; INTRAVENOUS at 10:51

## 2023-09-22 RX ADMIN — PROPOFOL 20 MG: 10 INJECTION, EMULSION INTRAVENOUS at 12:05

## 2023-09-22 RX ADMIN — PROPOFOL 10 MG: 10 INJECTION, EMULSION INTRAVENOUS at 11:49

## 2023-09-22 RX ADMIN — PROPOFOL 20 MG: 10 INJECTION, EMULSION INTRAVENOUS at 12:18

## 2023-09-22 RX ADMIN — PROPOFOL 10 MG: 10 INJECTION, EMULSION INTRAVENOUS at 11:40

## 2023-09-22 RX ADMIN — PROPOFOL 30 MG: 10 INJECTION, EMULSION INTRAVENOUS at 12:12

## 2023-09-22 RX ADMIN — PROPOFOL 10 MG: 10 INJECTION, EMULSION INTRAVENOUS at 11:53

## 2023-09-22 RX ADMIN — PROPOFOL 10 MG: 10 INJECTION, EMULSION INTRAVENOUS at 12:27

## 2023-09-22 RX ADMIN — SODIUM CHLORIDE: 9 INJECTION, SOLUTION INTRAVENOUS at 10:51

## 2023-09-22 RX ADMIN — PROPOFOL 20 MG: 10 INJECTION, EMULSION INTRAVENOUS at 12:21

## 2023-09-22 RX ADMIN — PROPOFOL 10 MG: 10 INJECTION, EMULSION INTRAVENOUS at 11:36

## 2023-09-22 RX ADMIN — PROPOFOL 20 MG: 10 INJECTION, EMULSION INTRAVENOUS at 11:29

## 2023-09-22 RX ADMIN — PROPOFOL 10 MG: 10 INJECTION, EMULSION INTRAVENOUS at 11:37

## 2023-09-22 RX ADMIN — PROPOFOL 10 MG: 10 INJECTION, EMULSION INTRAVENOUS at 12:02

## 2023-09-22 RX ADMIN — PROPOFOL 10 MG: 10 INJECTION, EMULSION INTRAVENOUS at 11:51

## 2023-09-22 RX ADMIN — PROPOFOL 30 MG: 10 INJECTION, EMULSION INTRAVENOUS at 12:25

## 2023-09-22 RX ADMIN — FENTANYL CITRATE 25 MCG: 50 INJECTION, SOLUTION INTRAMUSCULAR; INTRAVENOUS at 11:30

## 2023-09-22 RX ADMIN — PROPOFOL 30 MG: 10 INJECTION, EMULSION INTRAVENOUS at 11:32

## 2023-09-22 RX ADMIN — PROPOFOL 20 MG: 10 INJECTION, EMULSION INTRAVENOUS at 12:08

## 2023-09-22 RX ADMIN — PROPOFOL 10 MG: 10 INJECTION, EMULSION INTRAVENOUS at 12:04

## 2023-09-22 NOTE — ANESTHESIA POSTPROCEDURE EVALUATION
Patient: Yamileth Slater    Procedure Summary       Date: 09/22/23 Room / Location: [facility] IR ROOM 5 / [facility] ANGIO INVASIVE LOCATION    Anesthesia Start: 1015 Anesthesia Stop: 1239    Procedure: Arteriogram possible angioplasty stent atherectomy thrombolysis lithotripsy Diagnosis:       PVD (peripheral vascular disease)      Atherosclerosis of native arteries of right leg with ulceration of heel and midfoot    Providers: Donnie Robles MD Provider: Breanne Maya CRNA    Anesthesia Type: general, MAC ASA Status: 4            Anesthesia Type: general, MAC    Vitals  No vitals data found for the desired time range.          Post Anesthesia Care and Evaluation    Patient location during evaluation: bedside  Patient participation: complete - patient participated  Level of consciousness: awake and alert  Pain score: 0  Pain management: adequate    Airway patency: patent  Anesthetic complications: No anesthetic complications  PONV Status: none  Cardiovascular status: acceptable  Respiratory status: acceptable  Hydration status: acceptable    Comments: ---------------------------               09/22/23                      0709       1239  ---------------------------   BP:          133/58      125/60   Pulse:         62        64   Resp:          20        16   Temp:   97.5 °F (36.4 °C)   SpO2:          99%       100  ---------------------------

## 2023-09-22 NOTE — CASE COMMUNICATION
Patient missed a ACSSIE  visit from [facility] on 9/22/23    Reason: Patient was not home at time of call. Patients sister states she was having an out patient procedure done and was not sure when she would be home.  Will follow up on next scheduled visit. Thank you.       For your records only.   As per home health protocol, MD must be notified of missed/cancelled visits; therefore the prescribed frequency was not met.

## 2023-09-22 NOTE — ANESTHESIA PROCEDURE NOTES
Peripheral IV    Patient location during procedure: OR  Start time: 9/22/2023 10:25 AM  End time: 9/22/2023 10:51 AM  Line placed for Fluids/Medication Admin, Sedation and Difficult Access.  Performed By   Anesthesiologist: Catarino Vo MD  CRNA/CAA: Breanne Maya CRNA  Preanesthetic Checklist  Completed: patient identified, IV checked, site marked, risks and benefits discussed, surgical consent, monitors and equipment checked, pre-op evaluation and timeout performed  Peripheral IV Prep   Patient position: supine   Prep: alcohol swabs and ChloraPrep  Patient monitoring: heart rate, cardiac monitor and continuous pulse ox  Peripheral IV Procedure   Laterality:right  : shoulder.  Catheter size: 22 G          Post Assessment   Dressing Type: transparent and tape.    IV Dressing/Site: clean, dry and intact

## 2023-09-22 NOTE — ANESTHESIA PREPROCEDURE EVALUATION
Anesthesia Evaluation     Patient summary reviewed and Nursing notes reviewed   no history of anesthetic complications:   NPO Solid Status: > 8 hours  NPO Liquid Status: > 2 hours           Airway   Mallampati: II  TM distance: >3 FB  Neck ROM: full  possible difficult intubation  Comment: ate: 22; Time: ; Blade Size: 4; Airway Device: LMA; Difficulty: Easy; Atraumatic? Yes; Removal Date: 22; Removal Time:   Dental    (+) poor dentation        Pulmonary    (+) pneumonia resolved , pleural effusion, a smoker Current Smoked day of surgery, COPD moderate,home oxygen (2L at bedtime), sleep apnea, decreased breath sounds  (-) shortness of breath    ROS comment:     FINDINGS:     Unchanged left neck catheter and right neck catheter. Interval  removal of enteric tube.     Cardiac silhouette is stable. Moderate interstitial prominence  and hazy retrocardiac opacification. No pneumothorax.     IMPRESSION:     Moderate interstitial prominence may reflect interstitial edema  or infectious processes.     Hazy retrocardiac opacification may reflect edema, infiltrate, or  atelectasis.           Electronically signed by:  Jm Mckeon MD  2022 6:54 PM  PE comment: Coarse  Duo-neb ordered pre-op   Cardiovascular   Exercise tolerance: poor (<4 METS)    ECG reviewed  PT is on anticoagulation therapy  Patient on routine beta blocker and Beta blocker given within 24 hours of surgery  Rhythm: regular  Rate: normal    (+) hypertension 2 medications or greater, past MI ()  >12 months, CAD, CABG () >6 Months, cardiac stents Drug eluting stent more than 12 months ago , dysrhythmias, CHF Diastolic >=55%, ALANIS, murmur (Grade II/VI systolic), PVD, hyperlipidemia,  carotid artery disease carotid bilateral  (-) angina, carotid bruits    ROS comment: [id number]  Sinus rhythm with occasional Premature ventricular complexes  Low voltage QRS  Borderline ECG  When compared with ECG of 26-AUG-2023  12:27,  Premature ventricular complexes are now Present  Vent. rate has increased BY  26 BPM  Questionable change in QRS duration    TTE: 1/10/22  Interpretation Summary  · The study is technically difficult for diagnosis. The quality of the study is limited due to patient body habitus and lung disease. Verbal consent was obtained from the patient to use Definity image enhancer in order to optimize the study.  · Left ventricular wall thickness is consistent with mild concentric hypertrophy.  · Estimated left ventricular EF = 65% Left ventricular ejection fraction appears to be 61 - 65%. Left ventricular systolic function is normal.  · Left ventricular diastolic function is consistent with (grade Ia w/high LAP) impaired relaxation.      Cardiac Cath: 11/13/21  Impression   A.  Distal left main and ostial circumflex artery stenosis with evidence of haziness.  B.  Patent LIMA to the LAD and a patent saphenous vein graft to the ramus intermedius branch.  C.  100% occluded right coronary artery with an occluded saphenous vein graft to the right coronary artery.     Recommendations: Due to the patient symptoms of chest pain with distal left main and ostial circumflex artery stenosis plan was to consider percutaneous intervention to the distal left main and ostial circumflex artery stenosis.  Discussed with Dr. Robles the coronary artery finding.  Dr. Robles reviewed the films.  Patient is not a candidate for a redo single-vessel coronary artery bypass grafting.  Plan was to proceed with percutaneous intervention.         Neuro/Psych  (+) psychiatric history Anxiety, poor historian.  (-) seizures, TIA, CVA  GI/Hepatic/Renal/Endo    (+) morbid obesity, GERD well controlled, renal disease (creatinine 3.55) CRI, dialysis and stones, diabetes mellitus type 2 using insulin, thyroid problem hypothyroidism    Musculoskeletal     Abdominal   (+) obese   Substance History   (-) alcohol use, drug use     OB/GYN negative ob/gyn  ROS   (-)  Pregnant    Comment: Hysterectomy         Other   arthritis,     ROS/Med Hx Other: BMI: 46.25  Hgb: 8.4  K+: 5.3    Hx: COPD: uses albuterol inhaler as needed last used this morning. Has not used the albuterol or Duo-nebulizer in about 6 months.    Hx: Sleep Apnea: CPAP noncompliant     Triple Bypass: 2013 has 5 total stents: Last dose was this morning.     Hx:GERD: controlled on Protonix     Dialysis: tues, thurs, sat. Last dialysis was yesterday.     T2DM: Last HgbA1C: 5.4      2014 arteriogram: PTA right SFA stent (Dr. Robles)   9/2022 bilateral lower extremity arteriogram: PTA proximal SFA, PTA and stent stenosis right SFA, PTA/stent right popliteal.  Left SFA PTA stent Olevia 6 x 40 mm 6 x 120 mm due to dissection, in stent stenosis post intervention [facility]  9/2022 left transmetatarsal amputation [facility]  12/2022 LEFT BKA Dr. White  12/2022 SUNDEEP: Right 0.55 biphasic.  Left 0.5 triphasic femoral, biphasic popliteal, monophasic distally.  Limited evaluation of waveforms secondary to heart rhythm and body habitus  8/2023 SUNDEEP: RIGHT Monophasic, absent DP     2014 attempted left carotid endarterectomy, procedure aborted unable to find carotid (Dr. Aguayo)  5/2014 LEFT Transfemoral carotid stent (Dr. Robles)     1/2022 tunnel cath placement  3/2023 RIGHT BVT (Dr. Wade)     2013 CABG x3  11/2021 LHC: Distal left main ostial circumflex stenosis, patent LIMA to LAD, 100% occluded RCA with occluded SVG to RCA  11/2021 PTA stent distal left main [facility]                      Anesthesia Plan    ASA 4     general and MAC   total IV anesthesia  intravenous induction     Anesthetic plan, risks, benefits, and alternatives have been provided, discussed and informed consent has been obtained with: patient.      CODE STATUS:

## 2023-09-27 ENCOUNTER — HOME CARE VISIT (OUTPATIENT)
Dept: HOME HEALTH SERVICES | Facility: CLINIC | Age: 63
End: 2023-09-27
Payer: MEDICARE

## 2023-09-27 PROCEDURE — G0157 HHC PT ASSISTANT EA 15: HCPCS

## 2023-09-27 NOTE — Clinical Note
"PT WAS REFERRED TO HOME HEALTH FOLLOWING: Pt presented initially s/p hospitalization 8/26/2023 to 8/28/2023 for Hyperkalemia requiring acute dialysis. Pt and sister (caregiver) also gave hx that she had been dealing with the transmetatarsal amputation right foot 1st and second toes and 3rd is \"trying to spontaneously amputate.\"  Pt demonstrated typical signs and sx dec strength LEs, dec endurance, gait deviations, dependence in trasnfers. Pt has skilled PT to address ddeficts and work toward safety and indep at home.    PRIOR VS CURRENT LEVEL OF FUNCTION:    GAIT: Unable to assess gait on eval. 60'ft and 40'ft outdoors on concrete terrain with RW and SBA-I on D/C    T/F: Sit to stands with CGA on eval. Sit to stands with independence on D/C     BED MOBILITY: Independent on eval and on D/C.    DISCHARGE CONDITION: GOOD    DISCHARGE REASON: GOALS MET    DISCHARGE TO SELF-CARE WITH FAMILY ASSIST AS NEEDED.      NEXT MD VISIT: Unsure"

## 2023-09-29 ENCOUNTER — HOME CARE VISIT (OUTPATIENT)
Dept: HOME HEALTH SERVICES | Facility: CLINIC | Age: 63
End: 2023-09-29
Payer: MEDICARE

## 2023-10-01 ENCOUNTER — HOME CARE VISIT (OUTPATIENT)
Dept: HOME HEALTH SERVICES | Facility: CLINIC | Age: 63
End: 2023-10-01
Payer: MEDICARE

## 2023-10-01 VITALS
TEMPERATURE: 97.2 F | OXYGEN SATURATION: 94 % | DIASTOLIC BLOOD PRESSURE: 90 MMHG | HEART RATE: 74 BPM | RESPIRATION RATE: 18 BRPM | SYSTOLIC BLOOD PRESSURE: 144 MMHG

## 2023-10-01 NOTE — CASE COMMUNICATION
I tried to call patient multiple time with every visit but I was unable to reach her.  I also attempted to call her sisters number as well with no answer, I attempted multiple visits and even tried to move her to later days in the week but was unable to reach the patient. The visit set has run out with no OT visits. If you would like to continue you will have to either put some more visits in or she may need a reassessment. Thank You.

## 2023-10-01 NOTE — CASE COMMUNICATION
Patient missed a [facility]SIE visit from [facility] on 9/29/23     Reason: Attempted to call patient multiple times the day before appt and the day of appt.  Unable to leave a message due to pts phone did not allow it.  Attempted to reach patients sister also with no answer.  Unable to reach the patient.       For your records only.   As per home health protocol, MD must be notified of missed/cancelled visits; therefore the prescribed eula quency was not met.

## 2023-10-01 NOTE — CASE COMMUNICATION
"PT WAS REFERRED TO HOME HEALTH FOLLOWING: Reason for referral: Pt presents s/p hospitalization 8/26/2023 to 8/28/2023 for Hyperkalemia and Patient requiring acute dialysis. Pt and sister (caregiver) also give hx that she has been dealing with the transmetatarsal amputation right foot 1st and second toes and 3rd is \"trying to spontaneously amputate.\" Pt says she still puts left leg on and walks some but not as much as when therapy was he re last 2 months ago. Pt demos typical signs and sx dec strength LEs, dec endurance, gait deviations, dependence in trasnfers. Pt would benefit from skilled PT to address ddeficts and work toward safety and indep at home.    PRIOR VS CURRENT LEVEL OF FUNCTION:    GAIT: Unable to assess gait on eval. 60'ft and 40'ft outdoors on concrete terrain with RW and SBA-I on D/C    T/F: Sit to stands with CGA on eval. Sit to stands with independen ce on D/C     BED MOBILITY: Independent on eval and on D/C.    DISCHARGE CONDITION: GOOD    DISCHARGE REASON: GOALS MET    DISCHARGE TO SELF-CARE WITH FAMILY ASSIST AS NEEDED.      NEXT MD VISIT: Unsure"

## 2023-10-01 NOTE — CASE COMMUNICATION
started trying to reach pt at 0730 no returned call as of 1325. called multiple times to pt, no voicemail, text pt as well, called sister and daugher no answerd calls or returned calls. will be a missed visit, thank you.

## 2023-10-06 ENCOUNTER — HOME CARE VISIT (OUTPATIENT)
Dept: HOME HEALTH SERVICES | Facility: CLINIC | Age: 63
End: 2023-10-06
Payer: MEDICARE

## 2023-10-06 VITALS
SYSTOLIC BLOOD PRESSURE: 138 MMHG | TEMPERATURE: 97.5 F | HEART RATE: 70 BPM | DIASTOLIC BLOOD PRESSURE: 70 MMHG | RESPIRATION RATE: 18 BRPM | OXYGEN SATURATION: 98 %

## 2023-10-06 PROCEDURE — G0300 HHS/HOSPICE OF LPN EA 15 MIN: HCPCS

## 2023-10-13 ENCOUNTER — HOME CARE VISIT (OUTPATIENT)
Dept: HOME HEALTH SERVICES | Facility: CLINIC | Age: 63
End: 2023-10-13
Payer: MEDICARE

## 2023-10-20 ENCOUNTER — HOME CARE VISIT (OUTPATIENT)
Dept: HOME HEALTH SERVICES | Facility: CLINIC | Age: 63
End: 2023-10-20
Payer: MEDICARE

## 2023-10-20 VITALS
OXYGEN SATURATION: 97 % | RESPIRATION RATE: 16 BRPM | HEART RATE: 68 BPM | SYSTOLIC BLOOD PRESSURE: 122 MMHG | DIASTOLIC BLOOD PRESSURE: 70 MMHG | TEMPERATURE: 97.6 F

## 2023-10-20 PROCEDURE — G0299 HHS/HOSPICE OF RN EA 15 MIN: HCPCS

## 2023-10-20 NOTE — HOME HEALTH
. HH TO DISCHARGE PT FROM  SERVICES PER  CARE ORDERS. PT SEEN AT  CARE ON 10/18/23. NEXT APPT IS 10/25/23. NO DRESSING CHANGE TODAY, PT STATED DAUGHTER CHANGED DRESSING LAST NIGHT. PT DISCHARGED TO FAMILY/CG AND WILL CONTINUE WD CARE.

## 2023-10-30 NOTE — PLAN OF CARE
Goal Outcome Evaluation:  Plan of Care Reviewed With: patient        Progress: improving  Outcome Summary: pt transfers to eob with sba using bedrails and hob elevated, seated therex eob and some reaching activity for core strengthening and bal assessment. sit to stand x 5 and then stands to amb with fww and after 2-3 steps feels like her leg is going to buckle so stepped back min assist and sat on bed . sit to sup with sba. would benifit from rehab to home stay at [facility].   Was The Patient On Physician Recommended Anticoagulation Therapy?: Please Select the Appropriate Response

## 2024-04-13 NOTE — SIGNIFICANT NOTE
- Body mass index is 33.91 kg/m²..  -  on weight loss.  - outpatient referral for outpatient weight management.   I discussed this patient's care with the on-call Nephrologist, Dr. Lauren, in terms of fluid management. Ordinarily, in cases of HHS (or DKA for that matter), patients are profoundly dehydrated, which is why aggressive fluid resuscitation is normally the cornerstone of treatment. This is because glucose is osmotically active, and in these patients is present to such a significant amount in the blood that the kidneys must excrete it in the urine. Water usually follows glucose in this situation, which is why these patients are usually so dehydrated.    However, this particular patient has end stage renal disease and is a dialysis patient. According to the nephrologist, this patient's kidneys don't work well enough to filter and excrete glucose. In other words, the glucose is accumulating in the kidneys and not being excreted. Without glucose in the urine, there should not be significant diuresis as would occur ordinarily in HHS. The nephrologist stated that in this case, the patient could be maintained on 50 cc's per hour of fluid in anticipation of dialysis in the AM. After discussing with my team and the nephrologist, I will change all PRN fluids to 50 cc's per hour for that reason. I will discuss this reasoning with the CCU nurses as well.       This document has been electronically signed by Alvarado Mauricio MD on January 9, 2022 18:02 CST

## 2025-02-05 NOTE — OUTREACH NOTE
Left voice message for patient to return call to Dr. Vela RN.    Please secure chat RN upon return call from patient.  (*For [facility]: [address] IM TRIAGE **For [facility]: [facility] [address] FP/IM TRIAGE)    Medical Week 3 Survey      Flowsheet Row Responses   [facility] patient discharged from? [facility]   Does the patient have one of the following disease processes/diagnoses(primary or secondary)? Other   Week 3 attempt successful? No   Unsuccessful attempts Attempt 1            Hilda EDGAR - Licensed Nurse

## 2025-05-08 NOTE — THERAPY TREATMENT NOTE
Here for her last iron infusion. Tolerated it well, no side effects. To return to the hospital if any sob, or call 911. Notify provider if any other adverse reaction. To return if any decreased fetal movement,bleeding, leaking fluid or pain.   Patient Name: Yamileth Slater  : 1960    MRN: [id number]                              Today's Date: 2022       Admit Date: 2022    Visit Dx:     ICD-10-CM ICD-9-CM   1. Diabetic ketoacidosis with coma associated with other specified diabetes mellitus ([facility])  E13.11 250.32   2. Hypertension, unspecified type  I10 401.9   3. Altered mental status, unspecified altered mental status type  R41.82 780.97   4. Dysphagia, unspecified type  R13.10 787.20   5. Impaired functional mobility, balance, gait, and endurance  Z74.09 V49.89   6. Impaired mobility and ADLs  Z74.09 V49.89    Z78.9      Patient Active Problem List   Diagnosis   • Chronic obstructive pulmonary disease (HCC)   • Chronic midline low back pain without sciatica   • Vitamin D deficiency   • Tobacco dependence syndrome   • Surgical follow-up care   • Shoulder joint pain   • Peripheral vascular disease ([facility])   • Pain   • Neurologic disorder associated with diabetes mellitus ([facility])   • Morbid obesity with BMI of 50.0-59.9, adult ([facility])   • Mixed hyperlipidemia   • Kidney stone   • History of colon polyps   • GERD (gastroesophageal reflux disease)   • Excoriated eczema   • Hypertension   • Encounter for medication refill   • Dyslipidemia   • Diabetes mellitus ([facility])   • COPD (chronic obstructive pulmonary disease) ([facility])   • Chronic folliculitis   • Coronary artery disease   • Mild persistent asthma   • 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   • Fall   • Hypothyroidism   • Carotid artery disease ([facility])   • Current smoker   • [place] abuse   • PAD (peripheral artery disease) ([facility])   • Intercostal pain   • Venous insufficiency   • LAFB (left anterior fascicular block)   • Pulmonary hypertension ([facility])   • Left hip pain   • Neck pain   • CKD (chronic kidney disease), symptom management only, stage 5 ([facility])   • MIKE and COPD overlap syndrome ([facility])   • Pneumonia due to COVID-19 virus   • Hyperkalemia   • Cutaneous candidiasis   •  Community acquired pneumonia of right middle lobe of lung   • MRSA infection   • Alkaline phosphatase elevation   • COVID-19 ruled out by laboratory testing   • Esophageal dysphagia   • Monilial esophagitis ([facility])   • NSTEMI, initial episode of care ([facility])   • Pneumonia due to infectious organism   • Cellulitis of left leg   • Unspecified diastolic (congestive) heart failure ([facility])   • Hyponatremia   • Urinary tract infection due to Proteus   • History of insertion of tunneled central venous catheter (CVC) with port   • Anemia due to chronic kidney disease, on chronic dialysis ([facility])   • Pneumonitis   • Personal history of noncompliance with medical treatment, presenting hazards to health   • Type 2 diabetes mellitus with kidney complication, with long-term current use of insulin ([facility])   • Physical deconditioning     Past Medical History:   Diagnosis Date   • Acute blood loss anemia 4/16/2017    Likely due to gastric oozing at this time. - Dr. Duarte (GI) was consulted and has now signed off, will follow up outpatient - pill colonoscopy showed AVMs - continue to monitor   • Acute cystitis with hematuria 3/31/2021    1/13: IV Rocephin 1 gm q 24 1/14 : transitioned  to omnicef 300mg. Urine cultures resulted and did not show growth. Omnicef discontinued as patient is asymptomatic   • Altered mental status 1/9/2022    - AMS on presentation - initial ABG pH 7.3, CO2 34 - Procal 0.29 - UA negative for acute cysitits -CTA head wnl  - Empiric Zosyn and Vancomycin -Lactate 2.5 on admission  - blood cultures no growth at 24 hours     • Anxiety    • CAD (coronary artery disease) 4/24/2021    S/P 3 stents 5/1/2021 for [facility] Continue ASA 81mg & Clopidogrel 75mg Continue Atorvastatin 40mg   • Carotid artery stenosis    • Chronic obstructive lung disease (HCC)    • CKD (chronic kidney disease) stage 4, GFR 15-29 ml/min ([facility])    • Colonic polyp    • Coronary arteriosclerosis    • Diabetes mellitus ([facility])    • Diabetic neuropathy ([facility])     • Ear pain, right 10/18/2021    - canal trauma due to patient scratching and DMT2 - added cortisporin ear drops   • Elevated troponin 10/12/2021    -most likely from CKD -Trending down -Neg chest pain   • GERD (gastroesophageal reflux disease)    • GI bleed 5/13/2021    - GI will follow up outpatient - Protonix 40mg daily - Avoid medical DVT prophy and use mechanical at this time instead. - Continue to monitor - pill colonoscopy results showed AVMs   • History of transfusion    • Hypercholesterolemia    • Hypertension    • Hypomagnesemia 6/27/2021    Monitor and replace   • Morbid obesity (HCC)    • Nephrolithiasis    • Peripheral vascular disease ([facility])    • SIRS (systemic inflammatory response syndrome) ([facility]) 1/9/2022    Admission  - WBC 17.78   -   - RR 16 - 1/10: VSS/wnl - CXR - Mild pulm edema - Blood cultures no growth at 24 hours  - Procalcitonin 0.29 - UA : glucose 1000, negative Leucocytes/nitrate - Empiric Zosyn and Vancomcyin    • Sleep apnea    • Substance abuse ([facility])    • Vitamin D deficiency      Past Surgical History:   Procedure Laterality Date   • CARDIAC CATHETERIZATION N/A 7/14/2020   • CARDIAC CATHETERIZATION N/A 4/23/2021    Procedure: Left Heart Cath;  Surgeon: Melba Romo MD;  Location: [facility] CATH INVASIVE LOCATION;  Service: Cardiology;  Laterality: N/A;   • CARDIAC CATHETERIZATION N/A 4/30/2021    Procedure: Percutaneous Coronary Intervention;  Surgeon: Russell Voss MD;  Location: [facility] CATH INVASIVE LOCATION;  Service: Cardiovascular;  Laterality: N/A;   • CARDIAC CATHETERIZATION N/A 4/30/2021    Procedure: Stent NIKKI coronary;  Surgeon: Russell Voss MD;  Location: [facility] CATH INVASIVE LOCATION;  Service: Cardiovascular;  Laterality: N/A;   • CARDIAC CATHETERIZATION Left 11/13/2021    Procedure: Left Heart Cath;  Surgeon: Niall Rios MD;  Location: [facility] CATH INVASIVE LOCATION;  Service: Cardiology;  Laterality: Left;   • CAROTID STENT Left    •  COLONOSCOPY     • COLONOSCOPY N/A 5/14/2021    Procedure: COLONOSCOPY;  Surgeon: Mingo Duarte MD;  Location: [facility] ENDOSCOPY;  Service: Gastroenterology;  Laterality: N/A;   • CORONARY ARTERY BYPASS GRAFT N/A 2013    CABG X 3   • CYSTOSCOPY BLADDER STONE LITHOTRIPSY Bilateral    • ENDOSCOPY N/A 4/12/2021    Procedure: ESOPHAGOGASTRODUODENOSCOPY;  Surgeon: Mingo Duarte MD;  Location: [facility] ENDOSCOPY;  Service: Gastroenterology;  Laterality: N/A;   • ENDOSCOPY N/A 5/14/2021    Procedure: ESOPHAGOGASTRODUODENOSCOPY;  Surgeon: Mingo Duarte MD;  Location: [facility] ENDOSCOPY;  Service: Gastroenterology;  Laterality: N/A;   • INTERVENTIONAL RADIOLOGY PROCEDURE N/A 10/21/2021    Procedure: tunneled central venous catheter placement;  Surgeon: Donnie Robles MD;  Location: [facility] ANGIO INVASIVE LOCATION;  Service: Interventional Radiology;  Laterality: N/A;      General Information     Row Name 01/16/22 1245          OT Time and Intention    Document Type therapy note (daily note)  -     Mode of Treatment individual therapy; occupational therapy  -KD     Row Name 01/16/22 1245          General Information    Patient Profile Reviewed yes  -KD     Existing Precautions/Restrictions fall  -KD     Row Name 01/16/22 1245          Cognition    Orientation Status (Cognition) oriented x 3  -KD     Row Name 01/16/22 1245          Safety Issues, Functional Mobility    Impairments Affecting Function (Mobility) balance; coordination; endurance/activity tolerance; strength; shortness of breath  -KD           User Key  (r) = Recorded By, (t) = Taken By, (c) = Cosigned By    Initials Name Provider Type    KD Rosanna Mullen COTA Occupational Therapy Assistant                 Mobility/ADL's     Row Name 01/16/22 1245          Transfers    Sit-Stand [place] (Transfers) contact guard  -     Row Name 01/16/22 1245          Sit-Stand Transfer    Assistive Device (Sit-Stand Transfers) walker, front-wheeled   -KD     Row Name 01/16/22 1245          Functional Mobility    Functional Mobility- Ind. Level contact guard assist  -KD     Functional Mobility- Device rolling walker  -KD     Functional Mobility-Distance (Feet) 10  -KD     Row Name 01/16/22 1245          Activities of Daily Living    BADL Assessment/Intervention toileting  -KD     Row Name 01/16/22 1245          Toileting Assessment/Training    [place] (Toileting) toileting skills; perform perineal hygiene; other (see comments); standby assist  Pt w/ LHE to perform pericare  -KD     Assistive Devices (Toileting) commode  -KD           User Key  (r) = Recorded By, (t) = Taken By, (c) = Cosigned By    Initials Name Provider Type    Rosanna Varner COTA Occupational Therapy Assistant               Obj/Interventions    No documentation.                Goals/Plan    No documentation.                Clinical Impression     Row Name 01/16/22 1245          Pain Scale: Numbers Pre/Post-Treatment    Pretreatment Pain Rating 10/10  Pt c/o pain in bottom and back  -KD     Posttreatment Pain Rating 10/10  -KD     Row Name 01/16/22 1245          Plan of Care Review    Plan of Care Reviewed With patient  -KD     Progress no change  -KD           User Key  (r) = Recorded By, (t) = Taken By, (c) = Cosigned By    Initials Name Provider Type    Rosanna Varner COTA Occupational Therapy Assistant               Outcome Measures     Row Name 01/16/22 1245          How much help from another is currently needed...    Putting on and taking off regular lower body clothing? 1  -KD     Bathing (including washing, rinsing, and drying) 1  -KD     Toileting (which includes using toilet bed pan or urinal) 1  -KD     Putting on and taking off regular upper body clothing 2  -KD     Taking care of personal grooming (such as brushing teeth) 3  -KD     Eating meals 4  -KD     AM-PAC 6 Clicks Score (OT) 12  -KD           User Key  (r) = Recorded By, (t) = Taken By, (c) = Cosigned  By    Initials Name Provider Type    Rosanna Varner COTA Occupational Therapy Assistant                Occupational Therapy Education                 Title: PT OT SLP Therapies (In Progress)     Topic: Occupational Therapy (In Progress)     Point: ADL training (Not Started)     Description:   Instruct learner(s) on proper safety adaptation and remediation techniques during self care or transfers.   Instruct in proper use of assistive devices.              Learner Progress:  Not documented in this visit.          Point: Home exercise program (Not Started)     Description:   Instruct learner(s) on appropriate technique for monitoring, assisting and/or progressing therapeutic exercises/activities.              Learner Progress:  Not documented in this visit.          Point: Precautions (Done)     Description:   Instruct learner(s) on prescribed precautions during self-care and functional transfers.              Learning Progress Summary           Patient Acceptance, E,TB, VU by  at 1/12/2022 1553    Comment: POC, role of OT, transfer training   Family Acceptance, E,TB, VU by  at 1/12/2022 1553    Comment: POC, role of OT, transfer training                   Point: Body mechanics (Done)     Description:   Instruct learner(s) on proper positioning and spine alignment during self-care, functional mobility activities and/or exercises.              Learning Progress Summary           Patient Acceptance, E,TB, VU by  at 1/12/2022 1553    Comment: POC, role of OT, transfer training   Family Acceptance, E,TB, VU by  at 1/12/2022 1553    Comment: POC, role of OT, transfer training                               User Key     Initials Effective Dates Name Provider Type Discipline     06/14/21 -  Ottoniel Robles OT Occupational Therapist OT              OT Recommendation and Plan     Plan of Care Review  Plan of Care Reviewed With: patient  Progress: no change     Time Calculation:    Time Calculation- OT     Row  Name 01/16/22 1245             Time Calculation- OT    OT Start Time 1245  -KD      OT Stop Time 1300  -KD      OT Time Calculation (min) 15 min  -KD      Total Timed Code Minutes- OT 15 minute(s)  -KD      OT Received On 01/16/22  -KD              Timed Charges    63177 - OT Self Care/Mgmt Minutes 15  -KD              Total Minutes    Timed Charges Total Minutes 15  -KD       Total Minutes 15  -KD            User Key  (r) = Recorded By, (t) = Taken By, (c) = Cosigned By    Initials Name Provider Type    Rosanna Varner COTA Occupational Therapy Assistant              Therapy Charges for Today     Code Description Service Date Service Provider Modifiers Qty    [id number] HC OT SELF CARE/MGMT/TRAIN EA 15 MIN 1/16/2022 Rosanna Mullen COTA GO 1               ISHAN Narvaez  1/16/2022

## (undated) DEVICE — SUT ETHLN 2/0 FS 18IN 664H

## (undated) DEVICE — MODEL BT2000 P/N 700287-012KIT CONTENTS: MANIFOLD WITH SALINE AND CONTRAST PORTS, SALINE TUBING WITH SPIKE AND HAND SYRINGE, TRANSDUCER: Brand: BT2000 AUTOMATED MANIFOLD KIT

## (undated) DEVICE — KT MANIFLD CARDIAC

## (undated) DEVICE — GLV SURG SENSICARE PI ORTHO SZ6.5 LF STRL

## (undated) DEVICE — SYRINGE, LUER LOCK, 10ML: Brand: MEDLINE

## (undated) DEVICE — NC TREK CORONARY DILATATION CATHETER 3.5 MM X 20 MM / RAPID-EXCHANGE: Brand: NC TREK

## (undated) DEVICE — CATH GUIDE SOFTVU SELECT/V HT OMNI .038 5F 65CM

## (undated) DEVICE — ANGIO-SEAL VIP VASCULAR CLOSURE DEVICE: Brand: ANGIO-SEAL

## (undated) DEVICE — PROXIMATE SKIN STAPLERS (35 WIDE) CONTAINS 35 STAINLESS STEEL STAPLES (FIXED HEAD): Brand: PROXIMATE

## (undated) DEVICE — SYS PANNUS RETENT LF

## (undated) DEVICE — DEV INFL MONARCH 20ML

## (undated) DEVICE — SHEET, T, LAPAROTOMY, STERILE: Brand: MEDLINE

## (undated) DEVICE — PAD,ABDOMINAL,8"X10",ST,LF: Brand: MEDLINE

## (undated) DEVICE — GW CHOICE PT J 300CM

## (undated) DEVICE — STERILE POLYISOPRENE POWDER-FREE SURGICAL GLOVES WITH EMOLLIENT COATING: Brand: PROTEXIS

## (undated) DEVICE — FIAPC® PROBE W/ FILTER 2200 C OD 2.3MM/6.9FR; L 2.2M/7.2FT: Brand: ERBE

## (undated) DEVICE — ANTIBACTERIAL UNDYED BRAIDED (POLYGLACTIN 910), SYNTHETIC ABSORBABLE SUTURE: Brand: COATED VICRYL

## (undated) DEVICE — SUT VIC 0 CT 36IN J958H

## (undated) DEVICE — ELECTRODE,RT,STRESS,FOAM,50PK: Brand: MEDLINE

## (undated) DEVICE — RUNTHROUGH NS EXTRA FLOPPY PTCA GUIDEWIRE: Brand: RUNTHROUGH

## (undated) DEVICE — BANDAGE,GAUZE,BULKEE II,4.5"X4.1YD,STRL: Brand: MEDLINE

## (undated) DEVICE — BITEBLOCK ENDO W/STRAP 60F A/ LF DISP

## (undated) DEVICE — PK POD 60

## (undated) DEVICE — ST CVR PROB PULLUP ULTRASND 5X48IN

## (undated) DEVICE — ARTERIAL NEEDLE: Brand: UNBRANDED

## (undated) DEVICE — ULTRAVERSE 035 PTA DILATATION CATHETER 3.0MM X 80MM BALLOON, 130CM SHAFT: Brand: ULTRAVERSE® 035 PTA DILATATION CATHETER

## (undated) DEVICE — GLIDESHEATH SLENDER STAINLESS STEEL KIT: Brand: GLIDESHEATH SLENDER

## (undated) DEVICE — MINI TREK CORONARY DILATATION CATHETER 2.0 MM X 12 MM / RAPID-EXCHANGE: Brand: MINI TREK

## (undated) DEVICE — HI-TORQUE WHISPER MS GUIDE WIRE .014 J TIP 3.0 CM X 190 CM: Brand: HI-TORQUE WHISPER

## (undated) DEVICE — KT INTRO MINISTICK MAX W/GW PALLADIUM ECHO 4F 21G 7CM

## (undated) DEVICE — NDL HYPO ECLPS SFTY 25G 1 1/2IN

## (undated) DEVICE — SUT VIC 3/0 SH 27IN J416H

## (undated) DEVICE — CATH THERMODIL SWANGANZ 4LUM 6F 110CM

## (undated) DEVICE — SOL IRR NACL 0.9PCT BT 1000ML

## (undated) DEVICE — PK CATH LAB 60

## (undated) DEVICE — MODEL AT P65, P/N 701554-001KIT CONTENTS: HAND CONTROLLER, 3-WAY HIGH-PRESSURE STOPCOCK WITH ROTATING END AND PREMIUM HIGH-PRESSURE TUBING: Brand: ANGIOTOUCH® KIT

## (undated) DEVICE — SINGLE-USE BIOPSY FORCEPS: Brand: RADIAL JAW 4

## (undated) DEVICE — SUT SILK B SUTUPK 2/0 18IN SA65H

## (undated) DEVICE — TREK CORONARY DILATATION CATHETER 2.50 MM X 20 MM / RAPID-EXCHANGE: Brand: TREK

## (undated) DEVICE — GLV SURG SENSICARE PI ORTHO SZ7.5 LF STRL

## (undated) DEVICE — PK ANGIO LF 60

## (undated) DEVICE — PATIENT RETURN ELECTRODE, SINGLE-USE, CONTACT QUALITY MONITORING, ADULT, WITH 9FT CORD, FOR PATIENTS WEIGING OVER 33LBS. (15KG): Brand: MEGADYNE

## (undated) DEVICE — CATH DIAG IMPULSE M/ PK 145 5FR

## (undated) DEVICE — DRSNG GZ CURAD XEROFORM NONADHS 5X9IN STRL

## (undated) DEVICE — SPNG GZ WOVN 4X4IN 12PLY 10/BX STRL

## (undated) DEVICE — INTENDED FOR TISSUE SEPARATION, AND OTHER PROCEDURES THAT REQUIRE A SHARP SURGICAL BLADE TO PUNCTURE OR CUT.: Brand: BARD-PARKER ® STAINLESS STEEL BLADES

## (undated) DEVICE — RADIFOCUS OPTITORQUE ANGIOGRAPHIC CATHETER: Brand: OPTITORQUE

## (undated) DEVICE — 6F .070 XB 3.5 100CM: Brand: VISTA BRITE TIP

## (undated) DEVICE — ULTRAVERSE 035 PTA DILATATION CATHETER 5.0MM X 100MM BALLOON, 130CM SHAFT: Brand: ULTRAVERSE® 035 PTA DILATATION CATHETER

## (undated) DEVICE — GLV SURG SENSICARE PI LF PF 8 GRN STRL

## (undated) DEVICE — SUT MNCRYL 3/0 Y936H

## (undated) DEVICE — PTCA DILATATION CATHETER: Brand: NC QUANTUM APEX™

## (undated) DEVICE — 3M™ IOBAN™ 2 ANTIMICROBIAL INCISE DRAPE 6651EZ: Brand: IOBAN™ 2

## (undated) DEVICE — INTRO SHEATH ART/FEM ENGAGE .038 6F12CM

## (undated) DEVICE — CATH DIAG EXPO .056 IM 6F 100CM

## (undated) DEVICE — GLV SURG SENSICARE POLYISPRN W/ALOE PF LF 6.5 GRN STRL

## (undated) DEVICE — STRYKER PERFORMANCE SERIES SAGITTAL BLADE: Brand: STRYKER PERFORMANCE SERIES

## (undated) DEVICE — THE PRONTO CATHETER IS INDICATED FOR THE REMOVAL OF FRESH, SOFT EMBOLI AND THROMBI FROM VESSELS IN THE CORONARY AND PERIPHERAL VASCULATURE.: Brand: PRONTO® V4 EXTRACTION CATHETER

## (undated) DEVICE — BNDG ELAS ELITE V/CLOSE 4IN 5YD LF STRL

## (undated) DEVICE — TBG PENCL TELESCP MEGADYNE SMOKE EVAC 10FT

## (undated) DEVICE — GW PERIPH GUIDERIGHT STD/J/TP PTFE/PCOAT SS 0.038IN 5X150CM

## (undated) DEVICE — BIOPATCH™ ANTIMICROBIAL DRESSING WITH CHLORHEXIDINE GLUCONATE IS A HYDROPHILLIC POLYURETHANE ABSORPTIVE FOAM WITH CHLORHEXIDINE GLUCONATE (CHG) WHICH INHIBITS BACTERIAL GROWTH UNDER THE DRESSING. THE DRESSING IS INTENDED TO BE USED TO ABSORB EXUDATE, COVER A WOUND CAUSED BY VASCULAR AND NONVASCULAR PERCUTANEOUS MEDICAL DEVICES DURING SURGERY, AS WELL AS REDUCE LOCAL INFECTION AND COLONIZATION OF MICROORGANISMS.: Brand: BIOPATCH

## (undated) DEVICE — A2000 MULTI-USE SYRINGE KIT, P/N 701277-003KIT CONTENTS: 100ML CONTRAST RESERVOIR AND TUBING WITH CONTRAST SPIKE AND CLAMP: Brand: A2000 MULTI-USE SYRINGE KIT

## (undated) DEVICE — BAND BAG 36" X 36": Brand: STERIMED

## (undated) DEVICE — CATH GUIDE LAUNCHER JL4.0 6F 100CM

## (undated) DEVICE — COPILOT KIT INCLUDES BLEEDBACK CONTROL VALVE / GUIDE WIRE INTRODUCER / TORQUE DEVICE: Brand: ACCESSORIES

## (undated) DEVICE — GLIDEPATH HEMODIALYSIS CATH, ST, DL, 14.5 FR. 23CM: Brand: GLIDEPATH LONG-TERM HEMODIALYSIS CATHETER WITH PRELOADED STYLET

## (undated) DEVICE — 450 ML BOTTLE OF 0.05% CHLORHEXIDINE GLUCONATE IN 99.95% STERILE WATER FOR IRRIGATION, USP AND APPLICATOR.: Brand: IRRISEPT ANTIMICROBIAL WOUND LAVAGE

## (undated) DEVICE — BALN PTCA TAKERU RX NC 3.5X15MM

## (undated) DEVICE — GOWN,PREVENTION PLUS,XLNG/XXLARGE,STRL: Brand: MEDLINE

## (undated) DEVICE — MINI TREK CORONARY DILATATION CATHETER 2.0 MM X 15 MM / RAPID-EXCHANGE: Brand: MINI TREK

## (undated) DEVICE — DRSNG WND GZ CURAD OIL EMULSION 3X3IN STRL

## (undated) DEVICE — RADIFOCUS GLIDEWIRE: Brand: GLIDEWIRE

## (undated) DEVICE — INTENDED FOR TISSUE SEPARATION, AND OTHER PROCEDURES THAT REQUIRE A SHARP SURGICAL BLADE TO PUNCTURE OR CUT.: Brand: BARD-PARKER ® DISPOSABLE SCALPELS

## (undated) DEVICE — UNDRPD BREATH 23X36 BG/10

## (undated) DEVICE — PINNACLE INTRODUCER SHEATH: Brand: PINNACLE

## (undated) DEVICE — GC 5F 056 XB 3.5 LBT: Brand: BRITE TIP

## (undated) DEVICE — GOWN,AURORA,NOREINF,RAGLAN,XL,STERILE: Brand: MEDLINE

## (undated) DEVICE — BNDG GZ SOF-FORM CONFRM 3X75IN LF STRL

## (undated) DEVICE — KT INTRO MINISTICK MAX W/GW NITNL/TUNG ECHO 4F 21G 7CM

## (undated) DEVICE — USE OF THE SMARTNEEDLE DEVICE IS INDICATED WHEN BLOOD FLOW MUST BE DETECTED FOR PERCUTANEOUS VESSEL CANNULATION. THE VESSEL MUST BE OF A CALIBER WHICH WOULD NORMALLY BE PUNCTURED WITH A NEEDLE AND/OR CATHETER OF THIS SIZE OR LARGER.: Brand: SMARTNEEDLE® VASCULAR ACCESS SYSTEM

## (undated) DEVICE — BALN PTCA TAKERU RX NC 3X12MM

## (undated) DEVICE — ADHS SKIN PREMIERPRO EXOFIN TOPICAL HI/VISC .5ML

## (undated) DEVICE — GW EMR FIX EXCHG J STD .035 3MM 260CM

## (undated) DEVICE — GW PERIPH GUIDERIGHT STD/EXCHNG/J/TIP SS 0.038IN 5X260CM

## (undated) DEVICE — DISPOSABLE TOURNIQUET CUFF SINGLE BLADDER, DUAL PORT AND QUICK CONNECT CONNECTOR: Brand: COLOR CUFF

## (undated) DEVICE — PAD HEMOST NEPTUNE 2X2IN

## (undated) DEVICE — GLV SURG SENSICARE PI ORTHO PF SZ7 LF STRL

## (undated) DEVICE — PATIENT RETURN ELECTRODE, SINGLE USE, CONTACT QUALITY MONITORING, ADULT WITH 9 FT (2.7 M) CORD. FOR PATIENTS WEIGHING OVER 33LBS. (15KG): Brand: MEGADYNE

## (undated) DEVICE — GLIDESHEATH BASIC HYDROPHILIC COATED INTRODUCER SHEATH: Brand: GLIDESHEATH

## (undated) DEVICE — BND PRESS RADL COMFRT 14IN STRL

## (undated) DEVICE — PK EXTRM LF 60

## (undated) DEVICE — CONTAINER,SPECIMEN,OR STERILE,4OZ: Brand: MEDLINE

## (undated) DEVICE — STARCLOSE SE VASCULAR CLOSURE SYSTEM: Brand: STARCLOSE SE

## (undated) DEVICE — PRECISION THIN (9.0 X 0.38 X 31.0MM)

## (undated) DEVICE — GLIDEPATH HEMODIALYSIS CATH, ST, DL, 14.5 FR. 27CM: Brand: GLIDEPATH LONG-TERM HEMODIALYSIS CATHETER WITH PRELOADED STYLET

## (undated) DEVICE — BNDG ELAS ELITE V/CLOSE 6IN 5YD LF STRL

## (undated) DEVICE — SYS COMPR FEMOSTOP GLD W/PUMP LF

## (undated) DEVICE — SOL SCRB POVIDONE IODINE

## (undated) DEVICE — DESTINATION PERIPHERAL GUIDING SHEATH: Brand: DESTINATION

## (undated) DEVICE — BALN PTA LUTONIX DRUGCOAT LP .035 5F 5X220MM

## (undated) DEVICE — DEV INDEFLATOR P/N 580289

## (undated) DEVICE — PK CATH CARD 40

## (undated) DEVICE — CATH DIAG EXPO M/ PK 6FR FL4/FR4 PIG 3PK

## (undated) DEVICE — DRSNG SURESITE WNDW 4X4.5

## (undated) DEVICE — SPNG LAP 18X18IN LF STRL PK/5

## (undated) DEVICE — TBG HI PRESSURE 500PSI 20IN

## (undated) DEVICE — CATH GUIDE CONVEY AL1 .058IN 5FR